# Patient Record
Sex: FEMALE | Race: WHITE | NOT HISPANIC OR LATINO | Employment: OTHER | URBAN - METROPOLITAN AREA
[De-identification: names, ages, dates, MRNs, and addresses within clinical notes are randomized per-mention and may not be internally consistent; named-entity substitution may affect disease eponyms.]

---

## 2017-01-03 ENCOUNTER — DOCUMENTATION ONLY (OUTPATIENT)
Dept: TRANSPLANT | Facility: CLINIC | Age: 27
End: 2017-01-03

## 2017-01-03 DIAGNOSIS — Z79.899 USES NEBULIZER AND INHALER AT HOME: ICD-10-CM

## 2017-01-03 LAB
EXT CALCIUM: 8.6
EXT CHLORIDE: 109
EXT CO2: 27
EXT CREATININE: 26 MG/DL
EXT GLUCOSE: 104
EXT POTASSIUM: 4.5
EXT SODIUM: 143 MMOL/L
EXT TACROLIMUS LVL: 7.1

## 2017-01-03 RX ORDER — PROMETHAZINE HYDROCHLORIDE 25 MG/1
TABLET ORAL
Qty: 45 TABLET | Refills: 0 | Status: SHIPPED | OUTPATIENT
Start: 2017-01-03 | End: 2017-03-06 | Stop reason: SDUPTHER

## 2017-01-03 RX ORDER — INHALER, ASSIST DEVICES
SPACER (EA) MISCELLANEOUS
Qty: 1 DEVICE | Refills: 0 | Status: SHIPPED | OUTPATIENT
Start: 2017-01-03 | End: 2018-01-01 | Stop reason: SDUPTHER

## 2017-01-03 NOTE — PROGRESS NOTES
Results of PFT's repeated on 12/29/16 documented on flow sheet and staff message sent to Dr. Alvarez to review them.

## 2017-01-04 ENCOUNTER — PATIENT MESSAGE (OUTPATIENT)
Dept: TRANSPLANT | Facility: CLINIC | Age: 27
End: 2017-01-04

## 2017-01-05 ENCOUNTER — TELEPHONE (OUTPATIENT)
Dept: TRANSPLANT | Facility: CLINIC | Age: 27
End: 2017-01-05

## 2017-01-05 NOTE — TELEPHONE ENCOUNTER
"Received call from patient stating that she woke up this am with "chest congestion" but can also feel her "sinuses draining". Denied sob, feeling sick, no elevated T or sore throat, no known sick exposure - added that the weather has been "awful". Stated that she called her PCP to be seen but there was no available appt today or tomorrow. Asking if she should go to her local ED. Recommended she try OTC antihistamine and mucinex, hydrate well. Call MD on call if she develops consistently productive cough with discolored sputum, fever, sob. ED if she felt she was having emergent s/s.Otherwise, keep us informed. Patient has issues with transportation here. Patient verbalized her understanding. Note forwarded to Dr. Alvarez for his review.  "

## 2017-01-09 DIAGNOSIS — R11.0 NAUSEA: Primary | ICD-10-CM

## 2017-01-09 RX ORDER — ONDANSETRON HYDROCHLORIDE 8 MG/1
8 TABLET, FILM COATED ORAL EVERY 8 HOURS PRN
Qty: 30 TABLET | Refills: 1 | Status: SHIPPED | OUTPATIENT
Start: 2017-01-09 | End: 2017-01-10 | Stop reason: SDUPTHER

## 2017-01-10 DIAGNOSIS — R11.0 NAUSEA: ICD-10-CM

## 2017-01-10 RX ORDER — ONDANSETRON HYDROCHLORIDE 8 MG/1
8 TABLET, FILM COATED ORAL EVERY 8 HOURS PRN
Qty: 30 TABLET | Refills: 1 | Status: SHIPPED | OUTPATIENT
Start: 2017-01-10 | End: 2018-04-23 | Stop reason: SDUPTHER

## 2017-01-10 NOTE — TELEPHONE ENCOUNTER
"Patient called to report that she "has not improved as fast as I think I should" following initiation of Doxycycline for bronchitis as prescribed by a local ED provider on January 6, 2017.  Chest congestion and sinus drainage persist.  She stated that she has been assessed by her local PCP, who would like to prescribe a course of IV Cefepime and either Decadron IM x 1 dose or a 5 day increase in her oral Prednisone dose.  Informed patient that I would notify Dr. Alvarez of her situation and call her back with his recommendations.  Sent Dr. Alvarez an Epic message to notify him of the patient's situation, and he responded as follows:    Cefepime should be ok but she also needs linezolid. She can have a one time dose of decadron.    Called patient to review Dr. Alvarez's plan.  She was in her PCP's office at the time of my call, so she repeated to the physician that Cefepime and Decadron could be prescribed and a course of Zyvox should be added per Dr. Alvarez's recommendations.  Her PCP denied having any questions.  The patient requested a refill for Zofran, stating "If I'm going to be on Zyvox, I'm going to need Zofran for my stomach."  Informed patient I would send the prescription request to Dr. Alvarez for approval.  Instructed patient to contact us with updates re: when she starts the antibiotic regimen and how she is feeling with the new antibiotics in progress.  The patient denied having any additional questions or needs, and stated she would keep us updated about her health status.    "

## 2017-01-12 ENCOUNTER — PATIENT MESSAGE (OUTPATIENT)
Dept: TRANSPLANT | Facility: CLINIC | Age: 27
End: 2017-01-12

## 2017-01-13 DIAGNOSIS — G89.4 CHRONIC PAIN SYNDROME: ICD-10-CM

## 2017-01-13 RX ORDER — OXYCODONE HYDROCHLORIDE 5 MG/1
10 TABLET ORAL 3 TIMES DAILY PRN
Qty: 90 TABLET | Refills: 0 | Status: SHIPPED | OUTPATIENT
Start: 2017-01-13 | End: 2017-01-26 | Stop reason: SDUPTHER

## 2017-01-13 NOTE — TELEPHONE ENCOUNTER
Patient sent a [employer] message to request a refill for Oxycodone.  Prescription sent to Dr. Alvarez for approval and then electronic transmission to the patient's local pharmacy.

## 2017-01-20 ENCOUNTER — TELEPHONE (OUTPATIENT)
Dept: TRANSPLANT | Facility: CLINIC | Age: 27
End: 2017-01-20

## 2017-01-20 ENCOUNTER — DOCUMENTATION ONLY (OUTPATIENT)
Dept: TRANSPLANT | Facility: CLINIC | Age: 27
End: 2017-01-20

## 2017-01-26 ENCOUNTER — PATIENT MESSAGE (OUTPATIENT)
Dept: TRANSPLANT | Facility: CLINIC | Age: 27
End: 2017-01-26

## 2017-01-26 DIAGNOSIS — G89.4 CHRONIC PAIN SYNDROME: ICD-10-CM

## 2017-01-26 RX ORDER — OXYCODONE HYDROCHLORIDE 5 MG/1
10 TABLET ORAL 3 TIMES DAILY PRN
Qty: 90 TABLET | Refills: 0 | Status: SHIPPED | OUTPATIENT
Start: 2017-01-26 | End: 2017-02-09 | Stop reason: SDUPTHER

## 2017-01-27 RX ORDER — TIZANIDINE 4 MG/1
TABLET ORAL
Qty: 90 TABLET | Refills: 0 | Status: SHIPPED | OUTPATIENT
Start: 2017-01-27 | End: 2017-03-13 | Stop reason: SDUPTHER

## 2017-01-31 ENCOUNTER — PATIENT MESSAGE (OUTPATIENT)
Dept: TRANSPLANT | Facility: CLINIC | Age: 27
End: 2017-01-31

## 2017-02-02 ENCOUNTER — PATIENT MESSAGE (OUTPATIENT)
Dept: TRANSPLANT | Facility: CLINIC | Age: 27
End: 2017-02-02

## 2017-02-02 DIAGNOSIS — R06.2 WHEEZING: ICD-10-CM

## 2017-02-02 RX ORDER — LEVALBUTEROL TARTRATE 45 UG/1
AEROSOL, METERED ORAL
Qty: 15 G | Refills: 0 | Status: SHIPPED | OUTPATIENT
Start: 2017-02-02 | End: 2017-11-13 | Stop reason: SDUPTHER

## 2017-02-02 RX ORDER — PROMETHAZINE HYDROCHLORIDE 25 MG/1
TABLET ORAL
Qty: 45 TABLET | Refills: 0 | Status: SHIPPED | OUTPATIENT
Start: 2017-02-02 | End: 2017-03-06 | Stop reason: SDUPTHER

## 2017-02-07 ENCOUNTER — PATIENT MESSAGE (OUTPATIENT)
Dept: TRANSPLANT | Facility: CLINIC | Age: 27
End: 2017-02-07

## 2017-02-07 DIAGNOSIS — E56.9 VITAMIN DEFICIENCY: Primary | ICD-10-CM

## 2017-02-08 ENCOUNTER — PATIENT MESSAGE (OUTPATIENT)
Dept: TRANSPLANT | Facility: CLINIC | Age: 27
End: 2017-02-08

## 2017-02-09 ENCOUNTER — PATIENT MESSAGE (OUTPATIENT)
Dept: TRANSPLANT | Facility: CLINIC | Age: 27
End: 2017-02-09

## 2017-02-09 ENCOUNTER — DOCUMENTATION ONLY (OUTPATIENT)
Dept: TRANSPLANT | Facility: CLINIC | Age: 27
End: 2017-02-09

## 2017-02-09 DIAGNOSIS — G89.4 CHRONIC PAIN SYNDROME: ICD-10-CM

## 2017-02-09 DIAGNOSIS — E56.9 VITAMIN DEFICIENCY: ICD-10-CM

## 2017-02-09 RX ORDER — OXYCODONE HYDROCHLORIDE 5 MG/1
10 TABLET ORAL 3 TIMES DAILY PRN
Qty: 90 TABLET | Refills: 0 | Status: SHIPPED | OUTPATIENT
Start: 2017-02-09 | End: 2017-02-23 | Stop reason: SDUPTHER

## 2017-02-09 NOTE — PROGRESS NOTES
Results of PFT's done on 2/2/17 documented on flow sheet and staff message sent Dr. Alvarez to review them.

## 2017-02-10 DIAGNOSIS — Z94.2 LUNG REPLACED BY TRANSPLANT: Primary | ICD-10-CM

## 2017-02-13 DIAGNOSIS — E84.19 CYSTIC FIBROSIS WITH GASTROINTESTINAL MANIFESTATIONS: ICD-10-CM

## 2017-02-13 RX ORDER — PANCRELIPASE 24000; 76000; 120000 [USP'U]/1; [USP'U]/1; [USP'U]/1
CAPSULE, DELAYED RELEASE PELLETS ORAL
Qty: 810 CAPSULE | Refills: 0 | Status: SHIPPED | OUTPATIENT
Start: 2017-02-13 | End: 2017-03-27 | Stop reason: SDUPTHER

## 2017-02-22 ENCOUNTER — TELEPHONE (OUTPATIENT)
Dept: TRANSPLANT | Facility: CLINIC | Age: 27
End: 2017-02-22

## 2017-02-22 NOTE — TELEPHONE ENCOUNTER
Returned call to [place] with medical records at [facility]. Asked if Dr. Alvarez is the interpreting provider for the PFT's done there. Explained that he is only the ordering provider and that we would not be forwarding a dictated report. Verbalized her understanding.

## 2017-02-22 NOTE — TELEPHONE ENCOUNTER
----- Message from Caitlin Thomason sent at 2/22/2017  2:58 PM CST -----  Contact: Aiyana from [facility]   Would like to know if Dr. Alvarez will be reading the patient results from the pulmonary function test and if so can they get a copy faxed. Please call  or fax

## 2017-02-23 ENCOUNTER — PATIENT MESSAGE (OUTPATIENT)
Dept: TRANSPLANT | Facility: CLINIC | Age: 27
End: 2017-02-23

## 2017-02-23 DIAGNOSIS — G89.4 CHRONIC PAIN SYNDROME: ICD-10-CM

## 2017-02-23 RX ORDER — OXYCODONE HYDROCHLORIDE 5 MG/1
10 TABLET ORAL 3 TIMES DAILY PRN
Qty: 90 TABLET | Refills: 0 | Status: SHIPPED | OUTPATIENT
Start: 2017-02-23 | End: 2017-03-09 | Stop reason: SDUPTHER

## 2017-02-27 ENCOUNTER — TELEPHONE (OUTPATIENT)
Dept: TRANSPLANT | Facility: CLINIC | Age: 27
End: 2017-02-27

## 2017-02-27 RX ORDER — BUTALBITAL, ACETAMINOPHEN AND CAFFEINE 50; 325; 40 MG/1; MG/1; MG/1
TABLET ORAL
Qty: 120 TABLET | Refills: 0 | Status: SHIPPED | OUTPATIENT
Start: 2017-02-27 | End: 2017-03-06 | Stop reason: SDUPTHER

## 2017-02-27 NOTE — TELEPHONE ENCOUNTER
Returned call to the patient who stated she recently saw her PCP who performed a glucose tolerance test and has dx'd her with having diabetes. Wants her to start Lantus insulin - patient is calling to find out it this is ok. Instructed her to follow the provider's instructions - will update . Patient has clinic visit scheduled on 3/6/17 - verbalized her understanding.

## 2017-02-27 NOTE — TELEPHONE ENCOUNTER
----- Message from Bravo Dumont sent at 2/27/2017  2:20 PM CST -----  Contact: pt   ..Patient calling to speak with coordinator about labs received from another ,  [phone]

## 2017-03-01 ENCOUNTER — TELEPHONE (OUTPATIENT)
Dept: OTOLARYNGOLOGY | Facility: CLINIC | Age: 27
End: 2017-03-01

## 2017-03-03 ENCOUNTER — TELEPHONE (OUTPATIENT)
Dept: OTOLARYNGOLOGY | Facility: CLINIC | Age: 27
End: 2017-03-03

## 2017-03-03 DIAGNOSIS — R06.2 WHEEZING: ICD-10-CM

## 2017-03-03 RX ORDER — LEVALBUTEROL TARTRATE 45 UG/1
AEROSOL, METERED ORAL
Qty: 15 G | Refills: 0 | Status: SHIPPED | OUTPATIENT
Start: 2017-03-03 | End: 2017-03-06 | Stop reason: SDUPTHER

## 2017-03-06 ENCOUNTER — TELEPHONE (OUTPATIENT)
Dept: TRANSPLANT | Facility: CLINIC | Age: 27
End: 2017-03-06

## 2017-03-06 ENCOUNTER — OFFICE VISIT (OUTPATIENT)
Dept: OBSTETRICS AND GYNECOLOGY | Facility: CLINIC | Age: 27
End: 2017-03-06
Payer: MEDICAID

## 2017-03-06 ENCOUNTER — OFFICE VISIT (OUTPATIENT)
Dept: TRANSPLANT | Facility: CLINIC | Age: 27
End: 2017-03-06
Payer: MEDICAID

## 2017-03-06 ENCOUNTER — HOSPITAL ENCOUNTER (OUTPATIENT)
Dept: PULMONOLOGY | Facility: CLINIC | Age: 27
Discharge: HOME | End: 2017-03-06
Payer: MEDICAID

## 2017-03-06 ENCOUNTER — HOSPITAL ENCOUNTER (OUTPATIENT)
Dept: RADIOLOGY | Facility: HOSPITAL | Age: 27
Discharge: HOME | End: 2017-03-06
Attending: INTERNAL MEDICINE
Payer: MEDICAID

## 2017-03-06 VITALS
TEMPERATURE: 99 F | WEIGHT: 114 LBS | BODY MASS INDEX: 21.52 KG/M2 | RESPIRATION RATE: 20 BRPM | HEIGHT: 61 IN | SYSTOLIC BLOOD PRESSURE: 134 MMHG | OXYGEN SATURATION: 95 % | DIASTOLIC BLOOD PRESSURE: 99 MMHG | HEART RATE: 110 BPM

## 2017-03-06 VITALS
WEIGHT: 114.63 LBS | DIASTOLIC BLOOD PRESSURE: 90 MMHG | HEIGHT: 61 IN | SYSTOLIC BLOOD PRESSURE: 144 MMHG | BODY MASS INDEX: 21.64 KG/M2

## 2017-03-06 DIAGNOSIS — G89.4 CHRONIC PAIN SYNDROME: ICD-10-CM

## 2017-03-06 DIAGNOSIS — Z01.419 ROUTINE GYNECOLOGICAL EXAMINATION: Primary | ICD-10-CM

## 2017-03-06 DIAGNOSIS — K86.89 PANCREATIC INSUFFICIENCY: Primary | ICD-10-CM

## 2017-03-06 DIAGNOSIS — Z94.2 LUNG REPLACED BY TRANSPLANT: ICD-10-CM

## 2017-03-06 DIAGNOSIS — J44.81 BRONCHIOLITIS OBLITERANS SYNDROME: ICD-10-CM

## 2017-03-06 DIAGNOSIS — M62.830 MUSCLE SPASM OF BACK: ICD-10-CM

## 2017-03-06 DIAGNOSIS — Z79.2 PROPHYLACTIC ANTIBIOTIC: ICD-10-CM

## 2017-03-06 DIAGNOSIS — E56.9 VITAMIN DEFICIENCY: Primary | ICD-10-CM

## 2017-03-06 DIAGNOSIS — Z48.298 ENCOUNTER FOLLOWING ORGAN TRANSPLANT: Primary | ICD-10-CM

## 2017-03-06 DIAGNOSIS — D84.9 IMMUNOSUPPRESSION: ICD-10-CM

## 2017-03-06 DIAGNOSIS — N94.6 DYSMENORRHEA: ICD-10-CM

## 2017-03-06 LAB
PRE FEV1 FVC: 39
PRE FEV1: 0.95
PRE FVC: 2.46
PREDICTED FEV1 FVC: 88
PREDICTED FEV1: 2.82
PREDICTED FVC: 3.24

## 2017-03-06 PROCEDURE — 71020 XR CHEST PA AND LATERAL: CPT | Mod: 26,,, | Performed by: RADIOLOGY

## 2017-03-06 PROCEDURE — 99214 OFFICE O/P EST MOD 30 MIN: CPT | Mod: 25,S$PBB,, | Performed by: INTERNAL MEDICINE

## 2017-03-06 PROCEDURE — 94010 BREATHING CAPACITY TEST: CPT | Mod: 26,S$PBB,, | Performed by: INTERNAL MEDICINE

## 2017-03-06 PROCEDURE — 99999 PR PBB SHADOW E&M-EST. PATIENT-LVL III: CPT | Mod: PBBFAC,,, | Performed by: OBSTETRICS & GYNECOLOGY

## 2017-03-06 PROCEDURE — 99213 OFFICE O/P EST LOW 20 MIN: CPT | Mod: PBBFAC | Performed by: OBSTETRICS & GYNECOLOGY

## 2017-03-06 PROCEDURE — 88175 CYTOPATH C/V AUTO FLUID REDO: CPT

## 2017-03-06 PROCEDURE — 99999 PR PBB SHADOW E&M-EST. PATIENT-LVL III: CPT | Mod: PBBFAC,,, | Performed by: INTERNAL MEDICINE

## 2017-03-06 PROCEDURE — 99395 PREV VISIT EST AGE 18-39: CPT | Mod: S$PBB,,, | Performed by: OBSTETRICS & GYNECOLOGY

## 2017-03-06 RX ORDER — ARIPIPRAZOLE 2 MG/1
2 TABLET ORAL DAILY
COMMUNITY
End: 2019-01-01

## 2017-03-06 RX ORDER — GABAPENTIN 300 MG/1
300 CAPSULE ORAL 3 TIMES DAILY
Qty: 90 CAPSULE | Refills: 11 | Status: ON HOLD | OUTPATIENT
Start: 2017-03-06 | End: 2018-01-01 | Stop reason: SDUPTHER

## 2017-03-06 NOTE — TELEPHONE ENCOUNTER
----- Message from Caitlin Thomason sent at 3/6/2017  1:20 PM CST -----  Contact: patient  Calling to ask Jena a question. Please call

## 2017-03-06 NOTE — PROGRESS NOTES
PT HERE FOR ANNUAL.  ON DEPO AND LUNG TRANSPLANT WANTS HER OFF DUE TO ? ABD SWELLING AND DIFFICULTY BREATHING.  DOES NOT WANT IUD DUE TO INFECTION AND PAIN ON INSERTION (HAD ONE PREVIOUSLY).  CONSIDERING [facility].    ROS:  GENERAL: No fever, chills, fatigability or weight loss.  VULVAR: No pain, no lesions and no itching.  VAGINAL: No relaxation, no itching, no discharge, no abnormal bleeding and no lesions.  ABDOMEN: No abdominal pain. Denies nausea. Denies vomiting. No diarrhea. No constipation  BREAST: Denies pain. No lumps. No discharge.  URINARY: No incontinence, no nocturia, no frequency and no dysuria.  CARDIOVASCULAR: No chest pain. No shortness of breath. No leg cramps.  NEUROLOGICAL: No headaches. No vision changes.  The remainder of the review of systems was negative.    PE:  General Appearance: normal weight And Well developed. Well nourished. In no acute distress.  Vulva: Lesions: No.  Urethral Meatus: Normal size. Normal location. No lesions. No prolapse.  Urethra: No masses. No tenderness. No prolapse. No scarring.  Bladder: No masses. No tenderness.  Vagina: Mucosa NI:yes discharge no, atrophy no, cystocele no or rectocele no.  Cervix: Lesion: no  Stenotic: no Cervical motion tenderness: no  Uterus: Uterus size: 5 weeks. Support good. Uterus size: Normal  Adnexa: Masses: No Tenderness: No CDS Nodularity: No  Abdomen: normal weight No masses. No tenderness.  Breasts: No bilateral masses. No bilateral discharge. No bilateral tenderness. No bilateral fibrocystic changes.  Neck: No thyroid enlargement. No thyroid masses.  Skin: Rashes: No      PROCEDURES:    PLAN:     DIAGNOSIS:  1. Routine gynecological examination    2. Dysmenorrhea        MEDICATIONS & ORDERS:  Orders Placed This Encounter    Liquid-based pap smear, screening       Patient was counseled today on the new ACS guidelines for cervical cytology screening as well as the current recommendations for breast cancer screening. She was counseled to  follow up with her PCP for other routine health maintenance. Counseling session lasted approximately 10 minutes, and all her questions were answered.         FOLLOW-UP: With me in [facility]

## 2017-03-06 NOTE — PROGRESS NOTES
LUNG TRANSPLANT RECIPIENT FOLLOW-UP    Reason for Visit: Follow-up after lung transplantation.                                                                                                         Date of Transplant: 6/12/14                                                                               Reason for Transplant: cystic fibrosis                                                                               Type of Transplant: bilateral sequential lung                                                                               CMV Status: D+ / R-                                                                               Major Complications:     1. Vocal cord dysfunction- resolved   2. Early readmission due to MRSA pulmonary infection with persistent positive cultures   3. A1 Rejection 8/2014   4. C glabrata positive sputum-recurrent  5. Pseudomonas pneumonia in 02/2015 resolved   6. 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 Mild CARLOS EDUARDO (grade 1)                                                                               History of Present Illness: Juanita Ibarra is a 27 y.o. year old female is here for follow up. Overall feeling little worse for last few weeks. Reports shortness of breath which is progessive since January. She received antibiotics and reports some improvement after that.  No fever, chills. No hemoptysis. Weight stable.     Review of Systems   Constitutional: Negative for chills and fever.   HENT: Negative for congestion.    Eyes: Negative for photophobia.   Respiratory: Positive for cough and shortness of breath. Negative for hemoptysis and wheezing.    Cardiovascular: Negative for chest pain and palpitations.   Gastrointestinal: Negative for nausea and vomiting.   Musculoskeletal: Negative for myalgias.   Skin: Negative for rash.   Neurological: Positive for weakness. Negative for focal weakness.   Psychiatric/Behavioral: Negative for depression.     BP (!) 134/99 (BP Location: Left arm,  "Patient Position: Sitting, BP Method: Automatic)  Pulse 110  Temp 98.6 °F (37 °C) (Oral)   Resp 20  Ht 5' 1" (1.549 m)  Wt 51.7 kg (114 lb)  SpO2 95%  BMI 21.54 kg/m2    Physical Exam   Constitutional: She is oriented to person, place, and time and well-developed, well-nourished, and in no distress. No distress.   HENT:   Head: Atraumatic.   Eyes: Pupils are equal, round, and reactive to light.   Neck: Neck supple.   Cardiovascular: Normal rate and regular rhythm.    Pulmonary/Chest: Effort normal and breath sounds normal. No respiratory distress. She has no wheezes.   Abdominal: Soft. There is no tenderness.   Musculoskeletal: Normal range of motion.   Neurological: She is alert and oriented to person, place, and time. GCS score is 15.   Skin: Skin is warm. She is not diaphoretic.     Labs:  cbc, cmp, tacrolimus Latest Ref Rng & Units 12/21/2016 12/22/2016 3/6/2017   TACROLIMUS LVL 5.0 - 15.0 ng/mL 4.5(L) 3.5(L) -   WHITE BLOOD CELL COUNT 3.90 - 12.70 K/uL 3.94 9.76 6.71   RBC 4.00 - 5.40 M/uL 3.65(L) 3.37(L) 4.37   HEMOGLOBIN 12.0 - 16.0 g/dL 11.1(L) 10.1(L) 12.7   HEMATOCRIT 37.0 - 48.5 % 34.7(L) 32.2(L) 40.8   MCV 82 - 98 fL 95 96 93   MCH 27.0 - 31.0 pg 30.4 30.0 29.1   MCHC 32.0 - 36.0 % 32.0 31.4(L) 31.1(L)   RDW 11.5 - 14.5 % 13.1 13.4 13.9   PLATELETS 150 - 350 K/uL 112(L) 129(L) 153   MPV 9.2 - 12.9 fL 11.4 11.8 11.9   GRAN # 1.8 - 7.7 K/uL 3.3 8.1(H) 3.4   LYMPH # 1.0 - 4.8 K/uL 0.6(L) 1.1 2.7   MONO # 0.3 - 1.0 K/uL 0.0(L) 0.6 0.4   EOSINOPHIL% 0.0 - 8.0 % 0.3 0.0 2.2   BASOPHIL% 0.0 - 1.9 % 0.3 0.0 0.3   DIFFERENTIAL METHOD - Automated Automated Automated   SODIUM 136 - 145 mmol/L 135(L) 140 139   POTASSIUM 3.5 - 5.1 mmol/L 4.7 5.1 4.0   CHLORIDE 95 - 110 mmol/L 109 113(H) 108   CO2 23 - 29 mmol/L 16(L) 20(L) 21(L)   GLUCOSE 70 - 110 mg/dL 412(H) 181(H) 133(H)   BUN BLD 6 - 20 mg/dL 19 23(H) 32(H)   CREATININE 0.5 - 1.4 mg/dL 1.5(H) 1.2 1.3   CALCIUM 8.7 - 10.5 mg/dL 8.0(L) 8.4(L) 9.5   PROTEIN " TOTAL 6.0 - 8.4 g/dL - - 8.0   ALBUMIN 3.5 - 5.2 g/dL - - 4.0   BILIRUBIN TOTAL 0.1 - 1.0 mg/dL - - 0.2   ALK PHOS 55 - 135 U/L - - 96   AST 10 - 40 U/L - - 20   ALT 10 - 44 U/L - - 23   ANION GAP 8 - 16 mmol/L 10 7(L) 10   EGFR IF AFRICAN AMERICAN >60 mL/min/1.73 m:2 54.6(A) >60.0 >60.0   EGFR IF NON-AFRICAN AMERICAN >60 mL/min/1.73 m:2 47.4(A) >60.0 56.0(A)     Pulmonary Function Tests 3/6/2017 2/2/2017 12/29/2016 12/19/2016 9/21/2016 8/11/2016 5/19/2016   FVC 2.46 2.6 2.75 2.54 2.83 2.56 2.94   FEV1 0.95 1.01 1.07 1.03 1.4 1.34 1.45   TLC (liters) - - - - - - -   FVC% 76 80 85 78 91 79 90   FEV1% 34 36 38 36 51 47 51   FEF 25-75 0.33 0.4 0.36 0.39 0.56 0.59 0.57   FEF 25-75% 10 12 10 11 17 17 17   TLC% - - - - - - -   RV - - - - - - -   RV% - - - - - - -       Imaging:  Results for orders placed during the hospital encounter of 03/06/17   X-Ray Chest PA And Lateral    Narrative Chest 2 views compared to 12/20/2016.  Postop changes and right portacatheter remain.  Heart size and pulmonary vascularity is similar.  The lungs are well-aerated and clear.  No pleural fluid.  Bones are intact.    Impression stable postoperative change.      Electronically signed by: ANTOINE TOPETE MD  Date:     03/06/17  Time:    09:28        Assessment:  1. Aftercare following organ transplant  2. Lung replaced by transplant  3. Immunosuppression  4.Prophylactic antibiotic  5.Bronchiolitis obliterans syndrome      Plan:   1. PFTs and CXR reviewed. PFTs show decline in FEV1. She has bronchiolitis obliterans syndrome but is not experiencing rapid decline clinically. We will monitor PFTs closely    2. Cont current immunosuppressant : Prograf and prednisone    3. Cont current abx: Dapsone    4. Cont pancreatic enzyme supplementation    Steffen Rachel MD  [facility] fellow    Attending Note:    I have seen and evaluated the patient with the fellow. Their note reflects the content of our discussion and my plan of care. She has stable CARLOS EDUARDO.  Complains of increase in chest pains. I will increase her gabapentin but explained to her that I will not increase her narcotics. I advised her to seek an evaluation by pain medicine in [place]. She says that because of her pain her dyspnea has worsened and she cannot exercise as much. I still advised her to exercise as much as she can since her swings in shortness of breath and stability are likely due to physical deconditioning.     Will adjust her tacrolimus to maintain a level >6 and restart mycophenolate.          Jake Alvarez MD  Pulmonary/Critical Care Medicine

## 2017-03-06 NOTE — TELEPHONE ENCOUNTER
"Returned call to the patient who asked if [facility] had a Pain Management clinic. Patient was informed today by Dr. Alvarez during her clinic visit that she needed to establish care with a Pain Management clinic because of her reported need for more pain medication than she currently takes. Informed that there is a Pain Management clinic at the [facility]. Patient wanted to know if they take "self pay" - uncertain re: their policy re: self pay but informed patient she could call them to discuss. Patient then stated that if they required her to be see a close intervals, this was too far to travel. Reinforced need to find a pain management provider closer to home. Received orders from Dr. Alvarez to arrange for the patient to take gabapentin 300 mg tid - patient requested that the erx be sent to her local [facility]'s.  "

## 2017-03-07 ENCOUNTER — OFFICE VISIT (OUTPATIENT)
Dept: OTOLARYNGOLOGY | Facility: CLINIC | Age: 27
End: 2017-03-07
Payer: MEDICAID

## 2017-03-07 VITALS
HEART RATE: 122 BPM | HEIGHT: 61 IN | DIASTOLIC BLOOD PRESSURE: 103 MMHG | SYSTOLIC BLOOD PRESSURE: 155 MMHG | BODY MASS INDEX: 21.81 KG/M2 | WEIGHT: 115.5 LBS

## 2017-03-07 DIAGNOSIS — J32.0 CHRONIC MAXILLARY SINUSITIS: Primary | ICD-10-CM

## 2017-03-07 DIAGNOSIS — J33.9 NASAL POLYPOSIS: ICD-10-CM

## 2017-03-07 DIAGNOSIS — E84.0 CYSTIC FIBROSIS WITH PULMONARY MANIFESTATIONS: ICD-10-CM

## 2017-03-07 DIAGNOSIS — Z94.2 LUNG REPLACED BY TRANSPLANT: ICD-10-CM

## 2017-03-07 PROCEDURE — 99999 PR PBB SHADOW E&M-EST. PATIENT-LVL III: CPT | Mod: PBBFAC,,, | Performed by: OTOLARYNGOLOGY

## 2017-03-07 PROCEDURE — 99214 OFFICE O/P EST MOD 30 MIN: CPT | Mod: 25,S$PBB,, | Performed by: OTOLARYNGOLOGY

## 2017-03-07 PROCEDURE — 31231 NASAL ENDOSCOPY DX: CPT | Mod: PBBFAC | Performed by: OTOLARYNGOLOGY

## 2017-03-07 PROCEDURE — 99213 OFFICE O/P EST LOW 20 MIN: CPT | Mod: PBBFAC | Performed by: OTOLARYNGOLOGY

## 2017-03-07 RX ORDER — TACROLIMUS 1 MG/1
3 CAPSULE ORAL EVERY 12 HOURS
Qty: 180 CAPSULE | Refills: 11
Start: 2017-03-07 | End: 2018-01-22 | Stop reason: SDUPTHER

## 2017-03-07 RX ORDER — PREDNISONE 10 MG/1
TABLET ORAL
Qty: 39 TABLET | Refills: 0 | Status: SHIPPED | OUTPATIENT
Start: 2017-03-07 | End: 2017-03-30 | Stop reason: DRUGHIGH

## 2017-03-07 RX ORDER — TACROLIMUS 0.5 MG/1
0.5 CAPSULE ORAL EVERY 12 HOURS
Qty: 60 CAPSULE | Refills: 11 | Status: SHIPPED | OUTPATIENT
Start: 2017-03-07 | End: 2017-09-14 | Stop reason: SDUPTHER

## 2017-03-07 RX ORDER — MYCOPHENOLATE MOFETIL 250 MG/1
500 CAPSULE ORAL 2 TIMES DAILY
Qty: 120 CAPSULE | Refills: 11 | Status: SHIPPED | OUTPATIENT
Start: 2017-03-07 | End: 2017-10-09 | Stop reason: SDUPTHER

## 2017-03-07 NOTE — PROGRESS NOTES
"  Subjective:      Juanita is a 27 y.o. female who comes for follow-up of CRS.  Over 2 years s/p revision ESS.  Had laryngeal framework surgery by Dr. Null in August 2016.  Has gradually worsening nasal congestion, facial and forehead pressure, postnasal drip.  Also snoring since lung transplant 3 years ago, getting worse.  Can't tolerate nasal rinses due to ear pain.  Thinks she may need sinus surgery again.    SNOT-22 score = 63, NOSE score = 80%    The patient's medications, allergies, past medical, surgical, social and family histories were reviewed and updated as appropriate.    A detailed review of systems was obtained with pertinent positives as per the above HPI, and otherwise negative.        Objective:     BP (!) 155/103  Pulse (!) 122  Ht 5' 1" (1.549 m)  Wt 52.4 kg (115 lb 8.3 oz)  BMI 21.83 kg/m2       Constitutional:   She appears well-developed. She is cooperative.     Head:  Normocephalic.     Nose:  Rhinorrhea present. No mucosal edema, septal deviation or polyps. No epistaxis. Turbinates normal, no turbinate masses and no turbinate hypertrophy.  Right sinus exhibits no maxillary sinus tenderness and no frontal sinus tenderness. Left sinus exhibits no maxillary sinus tenderness and no frontal sinus tenderness.   Thick crusts in nose, removed.    Mouth/Throat  Oropharynx clear and moist without lesions or asymmetry. No oropharyngeal exudate or posterior oropharyngeal erythema.     Neck:  No adenopathy. Normal range of motion present.     She has no cervical adenopathy.       Procedure    Nasal endoscopy performed.  See procedure note.     Left nasal valve     Left middle meatus with polyps     Left posterior nasal draiange     Right nasal valve     Right middle meatus     Nasopharynx        Data Reviewed    WBC (K/uL)   Date Value   03/06/2017 6.71     Eosinophil% (%)   Date Value   03/06/2017 2.2     Eos, Fluid (%)   Date Value   07/31/2014 1     Eos # (K/uL)   Date Value   03/06/2017 0.2 "     Platelets (K/uL)   Date Value   03/06/2017 153     Glucose (mg/dL)   Date Value   03/06/2017 133 (H)     No results found for: IGE    No sinus imaging available.      Assessment:     1. Chronic maxillary sinusitis    2. Nasal polyposis    3. Cystic fibrosis with pulmonary manifestations    4. Lung replaced by transplant         Plan:     Rx prednisone medium dose with taper.  Rx tobramycin-fluticasone compounded in nasal nebulizer from [employer].  Will defer consideration of revision sinus surgery until this summer when she is out of school.  Also instructed by Dr. Null to avoid intubation for 1 year after laryngeal surgery.  Return if symptoms worsen or fail to improve.

## 2017-03-07 NOTE — TELEPHONE ENCOUNTER
Received written order from Dr. Alvarez to increase Prograf dose to 3.5 mg every 12 hours (from 3 mg every 12 hours); resume  mg every 12 hours; repeat Prograf level, BMP, CBC next week.  Contacted patient, confirmed that the Prograf level is an accurate 12 hour trough and gave her dose change instructions which she stated she had written down and was able to repeat back correctly. Will have repeat lab work on 3/16/17. Patient also aware that her PCP, Dr. Godinez, has requested most recent clinic note and med list - patient verbalized her authorization to send requested information - faxed to Dr. Godinez's office.

## 2017-03-07 NOTE — PROCEDURES
Nasal/sinus endoscopy  Date/Time: 3/7/2017 4:26 PM  Performed by: RUDI LARRY  Authorized by: RUDI LARRY     Consent Done?:  Yes (Verbal)  Anesthesia:     Local anesthetic:  Lidocaine 4% and Tushar-Synephrine 1/2%    Patient tolerance:  Patient tolerated the procedure well with no immediate complications  Nose:     Procedure Performed:  Nasal Endoscopy  External:      No external nasal deformity  Intranasal:      Mucosa polyps     Mucosa ulcers not present     No mucosa lesions present     Turbinates not enlarged     No septum gross deformity  Nasopharynx:      No mucosa lesions     Adenoids not present     Posterior choanae patent     Eustachian tube patent     Grade 2 polyps in left middle meatus.  Thick yellow crusting in both maxillary anstrostomies, partially removed.

## 2017-03-07 NOTE — TELEPHONE ENCOUNTER
----- Message from Jake Alvarez MD sent at 3/6/2017  7:26 PM CST -----  Need to increase her tacro to 3.5 bid. Her level needs to be at least >6.

## 2017-03-08 ENCOUNTER — PATIENT MESSAGE (OUTPATIENT)
Dept: TRANSPLANT | Facility: CLINIC | Age: 27
End: 2017-03-08

## 2017-03-09 ENCOUNTER — TELEPHONE (OUTPATIENT)
Dept: TRANSPLANT | Facility: CLINIC | Age: 27
End: 2017-03-09

## 2017-03-09 DIAGNOSIS — G89.4 CHRONIC PAIN SYNDROME: ICD-10-CM

## 2017-03-09 RX ORDER — OXYCODONE HYDROCHLORIDE 5 MG/1
10 TABLET ORAL 3 TIMES DAILY PRN
Qty: 90 TABLET | Refills: 0 | Status: SHIPPED | OUTPATIENT
Start: 2017-03-09 | End: 2017-03-30 | Stop reason: ALTCHOICE

## 2017-03-09 NOTE — TELEPHONE ENCOUNTER
Message sent to Dr. Alvarez and patient contacted by phone because she also left a phone message. Please see telephone note dated today, 3/9/17.

## 2017-03-09 NOTE — TELEPHONE ENCOUNTER
----- Message from Bravo Dumont sent at 3/9/2017 11:43 AM CST -----  Contact: pt   Please call, [phone]

## 2017-03-09 NOTE — TELEPHONE ENCOUNTER
"Returned call to the patient - patient called because she wanted to make sure we knew she had "not" asked her PCP for pain meds recently but called to get a recommendation for a pain management provider.  Informed on 3/7/17 by Briseyda PCP's nurse when she called to request some recent office notes, that had spoken to the patient and reinforced that they do not prescribe narcotic pain meds, adding that the only exception had been Oct. 2016 when the patient was given an rx for oxycodone after calling them because she stated Dr. Alvarez was out of town and wouldn't be able to refill it for her in time. Patient had not been told she would be unable to get her refill, only that it may take longer than usual because he was out of the office. Patient maintains she believed at the time that she would not be able to obtain a refill for oxycodone before she ran out of her current supply.   Patient was sent a [facility] message today following her routine request to refill oxycodone, explaining per Dr. Alvarez, that he would give her 1 more refill but after that she needed to have a pain management  provider continue to support the pain medication. Recommended she try to wean the dose to make the rx last longer in case there is a delay getting an appt - stated that she has identified a pain management provider and requested he accept her as a patient but has not heard back from them yet. Is concerned that the appt will be after she no longer has meds. Informed that her comments and concerns would be passed on to Dr. Alvarez. Verbalized her understanding.  "

## 2017-03-10 ENCOUNTER — PATIENT MESSAGE (OUTPATIENT)
Dept: TRANSPLANT | Facility: CLINIC | Age: 27
End: 2017-03-10

## 2017-03-13 ENCOUNTER — PATIENT MESSAGE (OUTPATIENT)
Dept: TRANSPLANT | Facility: CLINIC | Age: 27
End: 2017-03-13

## 2017-03-13 RX ORDER — TIZANIDINE 4 MG/1
TABLET ORAL
Qty: 90 TABLET | Refills: 0 | Status: SHIPPED | OUTPATIENT
Start: 2017-03-13 | End: 2017-05-02 | Stop reason: SDUPTHER

## 2017-03-20 ENCOUNTER — TELEPHONE (OUTPATIENT)
Dept: OTOLARYNGOLOGY | Facility: CLINIC | Age: 27
End: 2017-03-20

## 2017-03-20 NOTE — TELEPHONE ENCOUNTER
Spoke with patient. Informed her that MRI is not contraindicated, however, if surgery/intubation were needed a size 6.0 ETT would be recommended. Stated no plans for surgery/intubation at this time, and verbalized understanding of information.

## 2017-03-20 NOTE — TELEPHONE ENCOUNTER
----- Message from Gary Null MD sent at 3/20/2017  1:42 PM CDT -----  Contact: Patient  No contraindication for an MRI. She has a [employer] ribbon implant in her vocal fold.   If they need to intubate her for anything, I'd use no larger than a size 6.0 ETT.    ----- Message -----     From: Dariana Payton RN     Sent: 3/20/2017   1:35 PM       To: Gary Null MD     See below. Any contraindication for MRI?   Dariana  ----- Message -----     From: Rosalva Shipley LPN     Sent: 3/20/2017  12:30 PM       To: Dariana Payton RN    Gisel Pickens had throat  Surgery in August to reconstruct her larynx by Dr. Null. Please call her. She want to know if she had anything done besides the implant because she would like to have an MRI done and do not know if she can or can not. She is currently admitted in [facility] in [place] as a hospital admitted patient. Thanks!  Rosalva    ----- Message -----     From: Parth Wolff     Sent: 3/20/2017  12:09 PM       To: [facility] Staff    Pt has a question regarding a past surgery and would like to speak to staff, please follow up at soonest convenience

## 2017-03-20 NOTE — TELEPHONE ENCOUNTER
Spoke to patient. I will send a message to Dr. Null's nurse to contact patient. Understanding verbalized. Call PRN.

## 2017-03-23 ENCOUNTER — TELEPHONE (OUTPATIENT)
Dept: OBSTETRICS AND GYNECOLOGY | Facility: CLINIC | Age: 27
End: 2017-03-23

## 2017-03-23 ENCOUNTER — PATIENT MESSAGE (OUTPATIENT)
Dept: TRANSPLANT | Facility: CLINIC | Age: 27
End: 2017-03-23

## 2017-03-23 ENCOUNTER — TELEPHONE (OUTPATIENT)
Dept: TRANSPLANT | Facility: CLINIC | Age: 27
End: 2017-03-23

## 2017-03-23 ENCOUNTER — TELEPHONE (OUTPATIENT)
Dept: OTOLARYNGOLOGY | Facility: CLINIC | Age: 27
End: 2017-03-23

## 2017-03-23 DIAGNOSIS — N94.6 DYSMENORRHEA: Primary | ICD-10-CM

## 2017-03-23 NOTE — TELEPHONE ENCOUNTER
Patient called to say that she is scheduled for a hysterectomy (laprascopic) on 4/11/17 and her GYN provider, Dr. Dias has asked her to request a medical clearance from us. Message sent to Dr. Alvarez and received his instructions that he needed to see her in clinic before clearing her, even though he saw her last on 3/6/17 for a routine follow post lung transplant visit. Patient informed, questioned need to be seen again so soon - restated to the patient that Dr. Alvarez is aware that he saw her on 3/6/17 but has instructed that she be scheduled for another visit before her surgery if she needs clearance. Patient will be here for other appts on 4/10/17, scheduled to be seen by Dr. Alvarez then - instructed her to arrive by 08:00 for labs and quickly have her other studies and get back for the visit so she will be finished in time to make it to her other appts. Patient verbalized her understanding.

## 2017-03-24 ENCOUNTER — TELEPHONE (OUTPATIENT)
Dept: OTOLARYNGOLOGY | Facility: CLINIC | Age: 27
End: 2017-03-24

## 2017-03-24 ENCOUNTER — PATIENT MESSAGE (OUTPATIENT)
Dept: TRANSPLANT | Facility: CLINIC | Age: 27
End: 2017-03-24

## 2017-03-24 DIAGNOSIS — Z94.2 LUNG REPLACED BY TRANSPLANT: Primary | ICD-10-CM

## 2017-03-27 ENCOUNTER — TELEPHONE (OUTPATIENT)
Dept: OTOLARYNGOLOGY | Facility: CLINIC | Age: 27
End: 2017-03-27

## 2017-03-27 DIAGNOSIS — E84.19 CYSTIC FIBROSIS WITH GASTROINTESTINAL MANIFESTATIONS: ICD-10-CM

## 2017-03-27 RX ORDER — PANCRELIPASE 24000; 76000; 120000 [USP'U]/1; [USP'U]/1; [USP'U]/1
CAPSULE, DELAYED RELEASE PELLETS ORAL
Qty: 810 CAPSULE | Refills: 0 | Status: SHIPPED | OUTPATIENT
Start: 2017-03-27 | End: 2017-05-16 | Stop reason: SDUPTHER

## 2017-03-28 DIAGNOSIS — J33.9 NASAL POLYPOSIS: ICD-10-CM

## 2017-03-28 DIAGNOSIS — J32.0 CHRONIC MAXILLARY SINUSITIS: Primary | ICD-10-CM

## 2017-03-28 DIAGNOSIS — Z94.2 LUNG REPLACED BY TRANSPLANT: ICD-10-CM

## 2017-03-28 DIAGNOSIS — E84.0 CYSTIC FIBROSIS WITH PULMONARY MANIFESTATIONS: ICD-10-CM

## 2017-03-29 ENCOUNTER — TELEPHONE (OUTPATIENT)
Dept: OTOLARYNGOLOGY | Facility: CLINIC | Age: 27
End: 2017-03-29

## 2017-03-29 DIAGNOSIS — E84.0 CYSTIC FIBROSIS WITH PULMONARY MANIFESTATIONS: ICD-10-CM

## 2017-03-29 DIAGNOSIS — J32.0 CHRONIC MAXILLARY SINUSITIS: Primary | ICD-10-CM

## 2017-03-29 NOTE — TELEPHONE ENCOUNTER
Called to schedule CT and post op with Dr. Olsen. No answer. Left message along with contact information to call back and schedule.

## 2017-03-30 ENCOUNTER — HOSPITAL ENCOUNTER (OUTPATIENT)
Dept: RADIOLOGY | Facility: HOSPITAL | Age: 27
Discharge: HOME | End: 2017-03-30
Attending: OTOLARYNGOLOGY
Payer: MEDICAID

## 2017-03-30 ENCOUNTER — HOSPITAL ENCOUNTER (OUTPATIENT)
Dept: RADIOLOGY | Facility: HOSPITAL | Age: 27
Discharge: HOME | End: 2017-03-30
Attending: INTERNAL MEDICINE
Payer: MEDICAID

## 2017-03-30 ENCOUNTER — OFFICE VISIT (OUTPATIENT)
Dept: TRANSPLANT | Facility: CLINIC | Age: 27
End: 2017-03-30
Payer: MEDICAID

## 2017-03-30 ENCOUNTER — HOSPITAL ENCOUNTER (OUTPATIENT)
Dept: PULMONOLOGY | Facility: CLINIC | Age: 27
Discharge: HOME | End: 2017-03-30
Payer: MEDICAID

## 2017-03-30 ENCOUNTER — TELEPHONE (OUTPATIENT)
Dept: OTOLARYNGOLOGY | Facility: CLINIC | Age: 27
End: 2017-03-30

## 2017-03-30 VITALS
RESPIRATION RATE: 20 BRPM | DIASTOLIC BLOOD PRESSURE: 91 MMHG | OXYGEN SATURATION: 99 % | SYSTOLIC BLOOD PRESSURE: 138 MMHG | TEMPERATURE: 98 F | BODY MASS INDEX: 22.47 KG/M2 | HEIGHT: 61 IN | HEART RATE: 121 BPM | WEIGHT: 119 LBS

## 2017-03-30 DIAGNOSIS — J32.0 CHRONIC MAXILLARY SINUSITIS: ICD-10-CM

## 2017-03-30 DIAGNOSIS — Z48.298 ENCOUNTER FOLLOWING ORGAN TRANSPLANT: Primary | ICD-10-CM

## 2017-03-30 DIAGNOSIS — Z79.2 PROPHYLACTIC ANTIBIOTIC: ICD-10-CM

## 2017-03-30 DIAGNOSIS — Z94.2 LUNG REPLACED BY TRANSPLANT: ICD-10-CM

## 2017-03-30 DIAGNOSIS — J44.81 BRONCHIOLITIS OBLITERANS SYNDROME: ICD-10-CM

## 2017-03-30 DIAGNOSIS — Z01.818 PRE-OP EVALUATION: ICD-10-CM

## 2017-03-30 DIAGNOSIS — D84.9 IMMUNOSUPPRESSION: ICD-10-CM

## 2017-03-30 DIAGNOSIS — E84.0 CYSTIC FIBROSIS WITH PULMONARY MANIFESTATIONS: ICD-10-CM

## 2017-03-30 LAB
PRE FEV1 FVC: 40
PRE FEV1: 0.93
PRE FVC: 2.35
PREDICTED FEV1 FVC: 88
PREDICTED FEV1: 2.82
PREDICTED FVC: 3.24

## 2017-03-30 PROCEDURE — 94010 BREATHING CAPACITY TEST: CPT | Mod: 26,S$PBB,, | Performed by: INTERNAL MEDICINE

## 2017-03-30 PROCEDURE — 99999 PR PBB SHADOW E&M-EST. PATIENT-LVL III: CPT | Mod: PBBFAC,,, | Performed by: INTERNAL MEDICINE

## 2017-03-30 PROCEDURE — 99213 OFFICE O/P EST LOW 20 MIN: CPT | Mod: PBBFAC | Performed by: INTERNAL MEDICINE

## 2017-03-30 PROCEDURE — 70486 CT MAXILLOFACIAL W/O DYE: CPT | Mod: 26,,, | Performed by: RADIOLOGY

## 2017-03-30 PROCEDURE — 99214 OFFICE O/P EST MOD 30 MIN: CPT | Mod: 25,S$PBB,, | Performed by: INTERNAL MEDICINE

## 2017-03-30 PROCEDURE — 71020 XR CHEST PA AND LATERAL: CPT | Mod: 26,,, | Performed by: RADIOLOGY

## 2017-03-30 RX ORDER — PREDNISONE 5 MG/1
5 TABLET ORAL DAILY
COMMUNITY
End: 2018-07-28 | Stop reason: SDUPTHER

## 2017-03-30 NOTE — TELEPHONE ENCOUNTER
Spoke to patient. Informed of surgery arrival time for 04/03/17 with Dr. Olsen of 10:00 AM. Verbalized understanding. Call PRN.

## 2017-03-31 ENCOUNTER — ANESTHESIA EVENT (OUTPATIENT)
Dept: SURGERY | Facility: HOSPITAL | Age: 27
DRG: 135 | End: 2017-03-31
Payer: MEDICAID

## 2017-03-31 DIAGNOSIS — E56.9 VITAMIN DEFICIENCY: ICD-10-CM

## 2017-03-31 RX ORDER — MORPHINE SULFATE 15 MG/1
15 TABLET, FILM COATED, EXTENDED RELEASE ORAL 2 TIMES DAILY
COMMUNITY
End: 2017-06-29 | Stop reason: ALTCHOICE

## 2017-03-31 NOTE — ANESTHESIA PREPROCEDURE EVALUATION
03/31/2017  Juanita Ibarra is a 27 y.o., female.  Patient has PONV with past anesthesia if gas is used. Will have itching post op with IV anesthetics/ resolves with benadryl..  Patient Active Problem List   Diagnosis    Cystic fibrosis with pulmonary manifestations    Sinusitis, chronic    Anxiety disorder    Other pain disorders related to psychological factors    Nausea    Protein calorie malnutrition    CF related Pancreatic insufficiency    Chronic pain with opiate use    Allergy to multiple antibiotics    Dysphagia, oropharyngeal    Chronic visceral pain (pleura)    Transaminitis    Gallstones    Hyperglycemia    Adrenal cortical steroids causing adverse effect in therapeutic use    Immunosuppression    Lung transplant status, bilateral    Dysphonia    Constipation    Portacath in place    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    Prophylactic antibiotic    Complication of transplanted lung    Acute rejection of lung transplant    Debility    Muscle spasm of back    Anemia    Headache    Aftercare following organ transplant    Leukopenia    SBO (small bowel obstruction)    Sterilization    Lung replaced by transplant    Fever    Cytomegalovirus (CMV) viremia    Pneumonia    Hyperkalemia    Acute kidney injury    Other complications of lung transplant    S/P lung transplant    Bilious vomiting with nausea    Acute intractable headache    Pneumonia, organism unspecified    Lymphadenopathy    Biliary colic    Unilateral complete vocal fold paralysis    Bronchiolitis obliterans syndrome       [facility] Anesthesia Evaluation    I have reviewed the Patient Summary Reports.     I have reviewed the Medications.     Review of Systems  Anesthesia Hx:  Hx of Anesthetic complications Hx of PONV  History of prior surgery of interest to airway management or  planning: Personal Hx of Anesthesia complications, Post-Operative Nausea/Vomiting   Hematology/Oncology:  Hematology Normal   Oncology Normal     EENT/Dental:   Hx of vocal cord dsyfunction following laryngoscopy, s/p repair this summer   Cardiovascular:  Cardiovascular Normal     Pulmonary:   COPD S/p bilateral lung transplant    Renal/:   Chronic Renal Disease, CRI    Hepatic/GI:  Hepatic/GI Normal    Musculoskeletal:  Musculoskeletal Normal    Neurological:   Headaches    Endocrine:  Endocrine Normal    Dermatological:  Skin Normal    Psych:   Psychiatric History          Physical Exam  General:  Well nourished    Airway/Jaw/Neck:  Airway Findings: Mouth Opening: Normal Tongue: Normal  General Airway Assessment: Adult  Mallampati: I  Jaw/Neck Findings:  Neck ROM: Normal ROM      Dental:  Dental Findings: In tact   Chest/Lungs:  Chest/Lungs Findings: Clear to auscultation     Heart/Vascular:  Heart Findings: Rate: Normal  Rhythm: Regular Rhythm  Sounds: Normal        Mental Status:  Mental Status Findings:  Cooperative         Anesthesia Plan  Type of Anesthesia, risks & benefits discussed:  Anesthesia Type:  general  Patient's Preference: general   Intra-op Monitoring Plan:   Intra-op Monitoring Plan Comments:   Post Op Pain Control Plan:   Post Op Pain Control Plan Comments:   Induction:   IV  Beta Blocker:  Patient is not currently on a Beta-Blocker (No further documentation required).       Informed Consent: Patient understands risks and agrees with Anesthesia plan.  Questions answered. Anesthesia consent signed with patient.  ASA Score: 3     Day of Surgery Review of History & Physical:    H&P update referred to the surgeon.         Ready For Surgery From Anesthesia Perspective.

## 2017-03-31 NOTE — PROGRESS NOTES
LUNG TRANSPLANT RECIPIENT FOLLOW-UP    Reason for Visit: Follow-up after lung transplantation.     Date of Transplant: 6/12/14    Reason for Transplant: Cystic fibrosis     Type of Transplant: bilateral sequential lung    CMV Status: D+ / R-     Major Complications:   1. Vocal cord dysfunction- resolved   2. Early readmission due to MRSA pulmonary infection with persistent positive cultures   3. A1 Rejection 8/2014   4. C glabrata positive sputum-recurrent  5. Pseudomonas pneumonia in 02/2015 resolved   6. 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 CARLOS EDUARDO (grade 3)                                                                               History of Present Illness: Juanita Ibarra is a 27 y.o. year old female with a transplant history as outlined above. She presents today for follow up of a recent admission (local) for headaches and in preparation for FESS and hysterectomy in the coming weeks.    She was admitted about a week ago for severe headaches at a local hospital. Her workup was negative for infection and it was determined that her chronic sinusitis was the culprit.     She has also been seen by local pain physician for her chronic chest pain. He changed her IR oxycodone for MS Contin and she says it does not help as well as oxycodone does.    She has gained weight after she started to take hormones to control her metrorrhagia. She is scheduled for hysterectomy in the coming week.    She says that she continues to have a sporadic cough that sometimes will bring up phlegm. Will develop shortness of breath on heavy exertion. Her oxygenation remains normal.    Review of Systems   Constitutional: Negative for chills, fever and malaise/fatigue.        Weight gain   HENT: Negative for congestion, ear discharge, ear pain and sore throat.    Eyes: Negative for blurred vision and pain.   Respiratory: Positive for cough (sporadic). Negative for hemoptysis, sputum production, shortness of breath, wheezing and stridor.   "  Cardiovascular: Negative for chest pain, palpitations, orthopnea, claudication and leg swelling.   Gastrointestinal: Negative for abdominal pain, nausea and vomiting.   Genitourinary: Negative for dysuria, frequency, hematuria and urgency.   Musculoskeletal: Positive for myalgias (chronic  left sided chest pain). Negative for back pain.   Skin: Negative for itching and rash.   Neurological: Positive for headaches. Negative for tingling, tremors, focal weakness and seizures.   Psychiatric/Behavioral: Negative for depression and suicidal ideas. The patient is not nervous/anxious.      BP (!) 138/91 (BP Location: Left arm, Patient Position: Sitting, BP Method: Automatic)  Pulse (!) 121  Temp 98.3 °F (36.8 °C) (Oral)   Resp 20  Ht 5' 1" (1.549 m)  Wt 54 kg (119 lb)  SpO2 99%  BMI 22.48 kg/m2    Physical Exam   Constitutional: She is oriented to person, place, and time. No distress.   HENT:   Head: Normocephalic and atraumatic.   Eyes: Conjunctivae are normal. Pupils are equal, round, and reactive to light. No scleral icterus.   Neck: Neck supple. No JVD present. No tracheal deviation present. No thyromegaly present.   Cardiovascular: Normal rate.  Exam reveals no gallop and no friction rub.    Pulmonary/Chest: Effort normal and breath sounds normal. No stridor. No respiratory distress. She has no wheezes. She has no rales.   Abdominal: Soft. Bowel sounds are normal.   Musculoskeletal: She exhibits no edema.   Neurological: She is alert and oriented to person, place, and time. GCS score is 15.   Skin: Skin is warm. She is not diaphoretic. No erythema. No pallor.   Psychiatric: Mood and affect normal.     Labs:  Results for orders placed or performed in visit on 03/30/17   CBC W/ AUTO DIFFERENTIAL   Result Value Ref Range    WBC 5.88 3.90 - 12.70 K/uL    RBC 4.19 4.00 - 5.40 M/uL    Hemoglobin 12.0 12.0 - 16.0 g/dL    Hematocrit 37.7 37.0 - 48.5 %    MCV 90 82 - 98 fL    MCH 28.6 27.0 - 31.0 pg    MCHC 31.8 (L) " 32.0 - 36.0 %    RDW 16.4 (H) 11.5 - 14.5 %    Platelets 123 (L) 150 - 350 K/uL    MPV 10.4 9.2 - 12.9 fL    Gran # 3.3 1.8 - 7.7 K/uL    Lymph # 2.2 1.0 - 4.8 K/uL    Mono # 0.3 0.3 - 1.0 K/uL    Eos # 0.2 0.0 - 0.5 K/uL    Baso # 0.01 0.00 - 0.20 K/uL    Gran% 56.0 38.0 - 73.0 %    Lymph% 36.6 18.0 - 48.0 %    Mono% 4.3 4.0 - 15.0 %    Eosinophil% 2.7 0.0 - 8.0 %    Basophil% 0.2 0.0 - 1.9 %    Differential Method Automated    Comprehensive metabolic panel   Result Value Ref Range    Sodium 141 136 - 145 mmol/L    Potassium 3.6 3.5 - 5.1 mmol/L    Chloride 110 95 - 110 mmol/L    CO2 20 (L) 23 - 29 mmol/L    Glucose 107 70 - 110 mg/dL    BUN, Bld 26 (H) 6 - 20 mg/dL    Creatinine 1.3 0.5 - 1.4 mg/dL    Calcium 9.2 8.7 - 10.5 mg/dL    Total Protein 7.6 6.0 - 8.4 g/dL    Albumin 4.1 3.5 - 5.2 g/dL    Total Bilirubin 0.4 0.1 - 1.0 mg/dL    Alkaline Phosphatase 168 (H) 55 - 135 U/L    AST 55 (H) 10 - 40 U/L     (H) 10 - 44 U/L    Anion Gap 11 8 - 16 mmol/L    eGFR if African American >60.0 >60 mL/min/1.73 m^2    eGFR if non  56.0 (A) >60 mL/min/1.73 m^2   Magnesium   Result Value Ref Range    Magnesium 1.8 1.6 - 2.6 mg/dL   Tacrolimus level   Result Value Ref Range    Tacrolimus Lvl 8.1 5.0 - 15.0 ng/mL     *Note: Due to a large number of results and/or encounters for the requested time period, some results have not been displayed. A complete set of results can be found in Results Review.     Tacrolimus Lvl   Date Value Ref Range Status   03/30/2017 8.1 5.0 - 15.0 ng/mL Final     Comment:     Testing performed by Liquid Chromatography-Tandem  Mass Spectrometry (LC-MS/MS).  This test was developed and its performance characteristics  determined by [facility], Department of Pathology  and Laboratory Medicine in a manner consistent with CLIA  requirements. It has not been cleared or approved by the [place]  Food and Drug Administration.  This test is used for clinical   purposes.  It should  not be regarded as investigational or for  research.     03/06/2017 4.7 (L) 5.0 - 15.0 ng/mL Final     Comment:     Testing performed by Liquid Chromatography-Tandem  Mass Spectrometry (LC-MS/MS).  This test was developed and its performance characteristics  determined by [facility], Department of Pathology  and Laboratory Medicine in a manner consistent with CLIA  requirements. It has not been cleared or approved by the [place]  Food and Drug Administration.  This test is used for clinical   purposes.  It should not be regarded as investigational or for  research.     12/22/2016 3.5 (L) 5.0 - 15.0 ng/mL Final     Comment:     Testing performed by Liquid Chromatography-Tandem  Mass Spectrometry (LC-MS/MS).  This test was developed and its performance characteristics  determined by [facility], Department of Pathology  and Laboratory Medicine in a manner consistent with CLIA  requirements. It has not been cleared or approved by the [place]  Food and Drug Administration.  This test is used for clinical   purposes.  It should not be regarded as investigational or for  research.     12/21/2016 4.5 (L) 5.0 - 15.0 ng/mL Final     Comment:     Testing performed by Liquid Chromatography-Tandem  Mass Spectrometry (LC-MS/MS).  This test was developed and its performance characteristics  determined by [facility], Department of Pathology  and Laboratory Medicine in a manner consistent with CLIA  requirements. It has not been cleared or approved by the [place]  Food and Drug Administration.  This test is used for clinical   purposes.  It should not be regarded as investigational or for  research.       Pulmonary Function Tests 3/30/2017 3/6/2017 2/2/2017 12/29/2016 12/19/2016 9/21/2016 8/11/2016   FVC 2.35 2.46 2.6 2.75 2.54 2.83 2.56   FEV1 0.93 0.95 1.01 1.07 1.03 1.4 1.34   TLC (liters) - - - - - - -   FVC% 73 76 80 85 78 91 79   FEV1% 33 34 36 38 36 51 47   FEF 25-75 0.33 0.33 0.4 0.36 0.39 0.56 0.59    FEF 25-75% 10 10 12 10 11 17 17   TLC% - - - - - - -   RV - - - - - - -   RV% - - - - - - -     Imaging:  Results for orders placed during the hospital encounter of 05/19/16   X-Ray Chest PA And Lateral    Narrative Patient had a previous lung transplant.  Vascular catheter is seen with its tip in the superior vena cava and wire sutures are noted in the sternum.  Lungs are clear well-expanded with no infiltrate or pleural effusion.    Impression  Satisfactory postoperative findings.  ______________________________________     Electronically signed by: David Rojo MD  Date:     05/19/16  Time:    09:10      Assessment:  1. Encounter following organ transplant    2. Bronchiolitis obliterans syndrome    3. Immunosuppression    4. Prophylactic antibiotic    5. Pre-op evaluation      Plan:   1. Her pulmonary functions continue to be consistent with severe CARLOS EDUARDO but she has minimal symptoms and does not require oxygen. Will continue to monitor every 3-4 months.    2. Continue tacrolimus, mycophenolate and prednisone.     3. Continue dapsone.    4. There are no contraindications from pulmonary standpoint for FESS and hysterectomy. She will likely take longer to recover her oxygenation to baseline and I would recommend at least 1 night overnight admission after her surgeries. I am available to admit her under my service after her surgeries.    5. RTC in 3-4 months

## 2017-03-31 NOTE — PRE-PROCEDURE INSTRUCTIONS
Preop instructions: NPO after midnight or 8 hours prior to procedure time, shower instructions, directions, leave all valuables at home, medication instructions for PM prior & am of procedure explained. Patient stated an understanding.    Patient has PONV with past anesthesia if gas is used. Will have itching post op with IV anesthetics/ resolves with benadryl.    Aunt has Medical Power of . On chart here at [facility].

## 2017-04-03 ENCOUNTER — HOSPITAL ENCOUNTER (INPATIENT)
Facility: HOSPITAL | Age: 27
LOS: 2 days | Discharge: HOME | DRG: 135 | End: 2017-04-05
Attending: OTOLARYNGOLOGY | Admitting: OTOLARYNGOLOGY
Payer: MEDICAID

## 2017-04-03 ENCOUNTER — ANESTHESIA (OUTPATIENT)
Dept: SURGERY | Facility: HOSPITAL | Age: 27
DRG: 135 | End: 2017-04-03
Payer: MEDICAID

## 2017-04-03 DIAGNOSIS — J32.2 CHRONIC ETHMOIDAL SINUSITIS: ICD-10-CM

## 2017-04-03 DIAGNOSIS — E84.0 CYSTIC FIBROSIS WITH PULMONARY MANIFESTATIONS: Primary | ICD-10-CM

## 2017-04-03 LAB
GRAM STN SPEC: NORMAL
POCT GLUCOSE: 110 MG/DL (ref 70–110)

## 2017-04-03 PROCEDURE — 63600175 PHARM REV CODE 636 W HCPCS: Performed by: NURSE ANESTHETIST, CERTIFIED REGISTERED

## 2017-04-03 PROCEDURE — 63600175 PHARM REV CODE 636 W HCPCS: Performed by: OTOLARYNGOLOGY

## 2017-04-03 PROCEDURE — 87075 CULTR BACTERIA EXCEPT BLOOD: CPT

## 2017-04-03 PROCEDURE — 63600175 PHARM REV CODE 636 W HCPCS: Performed by: NURSE PRACTITIONER

## 2017-04-03 PROCEDURE — 36000710: Performed by: OTOLARYNGOLOGY

## 2017-04-03 PROCEDURE — 31288 NASAL/SINUS ENDOSCOPY SURG: CPT | Mod: 50,51,, | Performed by: OTOLARYNGOLOGY

## 2017-04-03 PROCEDURE — 09CX4ZZ EXTIRPATION OF MATTER FROM LEFT SPHENOID SINUS, PERCUTANEOUS ENDOSCOPIC APPROACH: ICD-10-PCS | Performed by: OTOLARYNGOLOGY

## 2017-04-03 PROCEDURE — 25000003 PHARM REV CODE 250: Performed by: NURSE PRACTITIONER

## 2017-04-03 PROCEDURE — 25000003 PHARM REV CODE 250: Performed by: OTOLARYNGOLOGY

## 2017-04-03 PROCEDURE — 09TV4ZZ RESECTION OF LEFT ETHMOID SINUS, PERCUTANEOUS ENDOSCOPIC APPROACH: ICD-10-PCS | Performed by: OTOLARYNGOLOGY

## 2017-04-03 PROCEDURE — 25000003 PHARM REV CODE 250: Performed by: NURSE ANESTHETIST, CERTIFIED REGISTERED

## 2017-04-03 PROCEDURE — 099R4ZZ DRAINAGE OF LEFT MAXILLARY SINUS, PERCUTANEOUS ENDOSCOPIC APPROACH: ICD-10-PCS | Performed by: OTOLARYNGOLOGY

## 2017-04-03 PROCEDURE — 27201423 OPTIME MED/SURG SUP & DEVICES STERILE SUPPLY: Performed by: OTOLARYNGOLOGY

## 2017-04-03 PROCEDURE — D9220A PRA ANESTHESIA: Mod: CRNA,,, | Performed by: NURSE ANESTHETIST, CERTIFIED REGISTERED

## 2017-04-03 PROCEDURE — 36000711: Performed by: OTOLARYNGOLOGY

## 2017-04-03 PROCEDURE — 87186 SC STD MICRODIL/AGAR DIL: CPT | Mod: 59

## 2017-04-03 PROCEDURE — 88305 TISSUE EXAM BY PATHOLOGIST: CPT | Performed by: PATHOLOGY

## 2017-04-03 PROCEDURE — 71000039 HC RECOVERY, EACH ADD'L HOUR: Performed by: OTOLARYNGOLOGY

## 2017-04-03 PROCEDURE — 31299 UNLISTED PX ACCESSORY SINUS: CPT | Mod: ,,, | Performed by: OTOLARYNGOLOGY

## 2017-04-03 PROCEDURE — 87015 SPECIMEN INFECT AGNT CONCNTJ: CPT | Mod: 91

## 2017-04-03 PROCEDURE — 82962 GLUCOSE BLOOD TEST: CPT | Performed by: OTOLARYNGOLOGY

## 2017-04-03 PROCEDURE — 25000003 PHARM REV CODE 250: Performed by: ANESTHESIOLOGY

## 2017-04-03 PROCEDURE — 31255 NSL/SINS NDSC W/TOT ETHMDCT: CPT | Mod: 50,51,, | Performed by: OTOLARYNGOLOGY

## 2017-04-03 PROCEDURE — D9220A PRA ANESTHESIA: Mod: ANES,,, | Performed by: ANESTHESIOLOGY

## 2017-04-03 PROCEDURE — 11000001 HC ACUTE MED/SURG PRIVATE ROOM

## 2017-04-03 PROCEDURE — 61782 SCAN PROC CRANIAL EXTRA: CPT | Mod: ,,, | Performed by: OTOLARYNGOLOGY

## 2017-04-03 PROCEDURE — 87070 CULTURE OTHR SPECIMN AEROBIC: CPT

## 2017-04-03 PROCEDURE — 63600175 PHARM REV CODE 636 W HCPCS

## 2017-04-03 PROCEDURE — 31267 ENDOSCOPY MAXILLARY SINUS: CPT | Mod: 50,51,, | Performed by: OTOLARYNGOLOGY

## 2017-04-03 PROCEDURE — 88305 TISSUE EXAM BY PATHOLOGIST: CPT | Mod: 26,,, | Performed by: PATHOLOGY

## 2017-04-03 PROCEDURE — 37000009 HC ANESTHESIA EA ADD 15 MINS: Performed by: OTOLARYNGOLOGY

## 2017-04-03 PROCEDURE — 37000008 HC ANESTHESIA 1ST 15 MINUTES: Performed by: OTOLARYNGOLOGY

## 2017-04-03 PROCEDURE — 31276 NSL/SINS NDSC FRNT TISS RMVL: CPT | Mod: 50,,, | Performed by: OTOLARYNGOLOGY

## 2017-04-03 PROCEDURE — 09BT4ZZ EXCISION OF LEFT FRONTAL SINUS, PERCUTANEOUS ENDOSCOPIC APPROACH: ICD-10-PCS | Performed by: OTOLARYNGOLOGY

## 2017-04-03 PROCEDURE — 71000033 HC RECOVERY, INTIAL HOUR: Performed by: OTOLARYNGOLOGY

## 2017-04-03 PROCEDURE — 87116 MYCOBACTERIA CULTURE: CPT | Mod: 59

## 2017-04-03 PROCEDURE — 27800903 OPTIME MED/SURG SUP & DEVICES OTHER IMPLANTS: Performed by: OTOLARYNGOLOGY

## 2017-04-03 PROCEDURE — 09TU4ZZ RESECTION OF RIGHT ETHMOID SINUS, PERCUTANEOUS ENDOSCOPIC APPROACH: ICD-10-PCS | Performed by: OTOLARYNGOLOGY

## 2017-04-03 PROCEDURE — 87102 FUNGUS ISOLATION CULTURE: CPT | Mod: 59

## 2017-04-03 PROCEDURE — 87077 CULTURE AEROBIC IDENTIFY: CPT | Mod: 59

## 2017-04-03 PROCEDURE — 87205 SMEAR GRAM STAIN: CPT | Mod: 91

## 2017-04-03 PROCEDURE — 09CW4ZZ EXTIRPATION OF MATTER FROM RIGHT SPHENOID SINUS, PERCUTANEOUS ENDOSCOPIC APPROACH: ICD-10-PCS | Performed by: OTOLARYNGOLOGY

## 2017-04-03 PROCEDURE — 09BS4ZZ EXCISION OF RIGHT FRONTAL SINUS, PERCUTANEOUS ENDOSCOPIC APPROACH: ICD-10-PCS | Performed by: OTOLARYNGOLOGY

## 2017-04-03 PROCEDURE — 8E09XBZ COMPUTER ASSISTED PROCEDURE OF HEAD AND NECK REGION: ICD-10-PCS | Performed by: OTOLARYNGOLOGY

## 2017-04-03 PROCEDURE — 99232 SBSQ HOSP IP/OBS MODERATE 35: CPT | Mod: 25,,, | Performed by: OTOLARYNGOLOGY

## 2017-04-03 PROCEDURE — 63600175 PHARM REV CODE 636 W HCPCS: Performed by: ANESTHESIOLOGY

## 2017-04-03 DEVICE — IMPLANT PROPEL MOMETASONE: Type: IMPLANTABLE DEVICE | Site: NOSE | Status: FUNCTIONAL

## 2017-04-03 RX ORDER — ONDANSETRON 2 MG/ML
4 INJECTION INTRAMUSCULAR; INTRAVENOUS DAILY PRN
Status: DISCONTINUED | OUTPATIENT
Start: 2017-04-03 | End: 2017-04-03 | Stop reason: HOSPADM

## 2017-04-03 RX ORDER — POTASSIUM CHLORIDE 20 MEQ/15ML
40 SOLUTION ORAL
Status: DISCONTINUED | OUTPATIENT
Start: 2017-04-03 | End: 2017-04-05 | Stop reason: HOSPADM

## 2017-04-03 RX ORDER — PROPOFOL 10 MG/ML
VIAL (ML) INTRAVENOUS
Status: DISCONTINUED | OUTPATIENT
Start: 2017-04-03 | End: 2017-04-03

## 2017-04-03 RX ORDER — CLINDAMYCIN PHOSPHATE 900 MG/50ML
INJECTION, SOLUTION INTRAVENOUS
Status: DISCONTINUED | OUTPATIENT
Start: 2017-04-03 | End: 2017-04-03

## 2017-04-03 RX ORDER — HYDROMORPHONE HYDROCHLORIDE 1 MG/ML
INJECTION, SOLUTION INTRAMUSCULAR; INTRAVENOUS; SUBCUTANEOUS
Status: COMPLETED
Start: 2017-04-03 | End: 2017-04-03

## 2017-04-03 RX ORDER — BUTALBITAL, ACETAMINOPHEN AND CAFFEINE 50; 325; 40 MG/1; MG/1; MG/1
1 TABLET ORAL EVERY 6 HOURS PRN
Status: DISCONTINUED | OUTPATIENT
Start: 2017-04-03 | End: 2017-04-05 | Stop reason: HOSPADM

## 2017-04-03 RX ORDER — POLYETHYLENE GLYCOL 3350 17 G/17G
17 POWDER, FOR SOLUTION ORAL 2 TIMES DAILY
Status: DISCONTINUED | OUTPATIENT
Start: 2017-04-03 | End: 2017-04-05 | Stop reason: HOSPADM

## 2017-04-03 RX ORDER — DIPHENHYDRAMINE HYDROCHLORIDE 50 MG/ML
INJECTION INTRAMUSCULAR; INTRAVENOUS
Status: COMPLETED
Start: 2017-04-03 | End: 2017-04-03

## 2017-04-03 RX ORDER — DAPSONE 100 MG/1
100 TABLET ORAL DAILY
Status: DISCONTINUED | OUTPATIENT
Start: 2017-04-04 | End: 2017-04-05 | Stop reason: HOSPADM

## 2017-04-03 RX ORDER — LEVALBUTEROL 1.25 MG/.5ML
1.25 SOLUTION, CONCENTRATE RESPIRATORY (INHALATION) EVERY 8 HOURS PRN
Status: DISCONTINUED | OUTPATIENT
Start: 2017-04-03 | End: 2017-04-05 | Stop reason: HOSPADM

## 2017-04-03 RX ORDER — INSULIN GLARGINE 300 [IU]/ML
5 INJECTION, SOLUTION SUBCUTANEOUS DAILY
Status: DISCONTINUED | OUTPATIENT
Start: 2017-04-04 | End: 2017-04-05 | Stop reason: HOSPADM

## 2017-04-03 RX ORDER — REMIFENTANIL HYDROCHLORIDE 1 MG/ML
INJECTION, POWDER, LYOPHILIZED, FOR SOLUTION INTRAVENOUS CONTINUOUS PRN
Status: DISCONTINUED | OUTPATIENT
Start: 2017-04-03 | End: 2017-04-03

## 2017-04-03 RX ORDER — MAGNESIUM SULFATE HEPTAHYDRATE 40 MG/ML
2 INJECTION, SOLUTION INTRAVENOUS
Status: DISCONTINUED | OUTPATIENT
Start: 2017-04-03 | End: 2017-04-05 | Stop reason: HOSPADM

## 2017-04-03 RX ORDER — ONDANSETRON 2 MG/ML
4 INJECTION INTRAMUSCULAR; INTRAVENOUS DAILY PRN
Status: DISCONTINUED | OUTPATIENT
Start: 2017-04-03 | End: 2017-04-05 | Stop reason: HOSPADM

## 2017-04-03 RX ORDER — INSULIN GLARGINE 300 [IU]/ML
3 INJECTION, SOLUTION SUBCUTANEOUS DAILY
Status: ON HOLD | COMMUNITY
End: 2018-01-01

## 2017-04-03 RX ORDER — ARIPIPRAZOLE 2 MG/1
2 TABLET ORAL DAILY
Status: DISCONTINUED | OUTPATIENT
Start: 2017-04-04 | End: 2017-04-05 | Stop reason: HOSPADM

## 2017-04-03 RX ORDER — KETAMINE HCL IN 0.9 % NACL 50 MG/5 ML
SYRINGE (ML) INTRAVENOUS
Status: DISCONTINUED | OUTPATIENT
Start: 2017-04-03 | End: 2017-04-03

## 2017-04-03 RX ORDER — GABAPENTIN 300 MG/1
300 CAPSULE ORAL 3 TIMES DAILY
Status: DISCONTINUED | OUTPATIENT
Start: 2017-04-03 | End: 2017-04-05 | Stop reason: HOSPADM

## 2017-04-03 RX ORDER — DIPHENHYDRAMINE HYDROCHLORIDE 50 MG/ML
25 INJECTION INTRAMUSCULAR; INTRAVENOUS EVERY 4 HOURS PRN
Status: DISCONTINUED | OUTPATIENT
Start: 2017-04-03 | End: 2017-04-04

## 2017-04-03 RX ORDER — PROPOFOL 10 MG/ML
VIAL (ML) INTRAVENOUS CONTINUOUS PRN
Status: DISCONTINUED | OUTPATIENT
Start: 2017-04-03 | End: 2017-04-03

## 2017-04-03 RX ORDER — PANTOPRAZOLE SODIUM 40 MG/1
40 TABLET, DELAYED RELEASE ORAL DAILY
Status: DISCONTINUED | OUTPATIENT
Start: 2017-04-04 | End: 2017-04-04

## 2017-04-03 RX ORDER — HYDROMORPHONE HYDROCHLORIDE 1 MG/ML
1 INJECTION, SOLUTION INTRAMUSCULAR; INTRAVENOUS; SUBCUTANEOUS EVERY 4 HOURS PRN
Status: DISCONTINUED | OUTPATIENT
Start: 2017-04-03 | End: 2017-04-03

## 2017-04-03 RX ORDER — FENTANYL CITRATE 50 UG/ML
INJECTION, SOLUTION INTRAMUSCULAR; INTRAVENOUS
Status: DISCONTINUED | OUTPATIENT
Start: 2017-04-03 | End: 2017-04-03

## 2017-04-03 RX ORDER — SODIUM CHLORIDE 0.9 % (FLUSH) 0.9 %
3 SYRINGE (ML) INJECTION
Status: DISCONTINUED | OUTPATIENT
Start: 2017-04-03 | End: 2017-04-05 | Stop reason: HOSPADM

## 2017-04-03 RX ORDER — HYDROMORPHONE HYDROCHLORIDE 1 MG/ML
1.5 INJECTION, SOLUTION INTRAMUSCULAR; INTRAVENOUS; SUBCUTANEOUS
Status: DISCONTINUED | OUTPATIENT
Start: 2017-04-03 | End: 2017-04-05 | Stop reason: HOSPADM

## 2017-04-03 RX ORDER — MAG HYDROX/ALUMINUM HYD/SIMETH 200-200-20
30 SUSPENSION, ORAL (FINAL DOSE FORM) ORAL
Status: DISCONTINUED | OUTPATIENT
Start: 2017-04-03 | End: 2017-04-05 | Stop reason: HOSPADM

## 2017-04-03 RX ORDER — MYCOPHENOLATE MOFETIL 250 MG/1
500 CAPSULE ORAL 2 TIMES DAILY
Status: DISCONTINUED | OUTPATIENT
Start: 2017-04-03 | End: 2017-04-05 | Stop reason: HOSPADM

## 2017-04-03 RX ORDER — SODIUM CHLORIDE 9 MG/ML
INJECTION, SOLUTION INTRAVENOUS CONTINUOUS PRN
Status: DISCONTINUED | OUTPATIENT
Start: 2017-04-03 | End: 2017-04-03

## 2017-04-03 RX ORDER — FLUTICASONE PROPIONATE 50 MCG
2 SPRAY, SUSPENSION (ML) NASAL DAILY
Status: DISCONTINUED | OUTPATIENT
Start: 2017-04-04 | End: 2017-04-05 | Stop reason: HOSPADM

## 2017-04-03 RX ORDER — TIZANIDINE 4 MG/1
8 TABLET ORAL EVERY 6 HOURS PRN
Status: DISCONTINUED | OUTPATIENT
Start: 2017-04-03 | End: 2017-04-05 | Stop reason: HOSPADM

## 2017-04-03 RX ORDER — PROMETHAZINE HYDROCHLORIDE 25 MG/1
25 TABLET ORAL EVERY 6 HOURS PRN
Status: DISCONTINUED | OUTPATIENT
Start: 2017-04-03 | End: 2017-04-03

## 2017-04-03 RX ORDER — LIDOCAINE HYDROCHLORIDE AND EPINEPHRINE 10; 10 MG/ML; UG/ML
INJECTION, SOLUTION INFILTRATION; PERINEURAL
Status: DISCONTINUED | OUTPATIENT
Start: 2017-04-03 | End: 2017-04-03 | Stop reason: HOSPADM

## 2017-04-03 RX ORDER — AZITHROMYCIN 250 MG/1
500 TABLET, FILM COATED ORAL
Status: DISCONTINUED | OUTPATIENT
Start: 2017-04-03 | End: 2017-04-05 | Stop reason: HOSPADM

## 2017-04-03 RX ORDER — MUPIROCIN 20 MG/G
OINTMENT TOPICAL
Status: DISCONTINUED | OUTPATIENT
Start: 2017-04-03 | End: 2017-04-03 | Stop reason: HOSPADM

## 2017-04-03 RX ORDER — DEXAMETHASONE SODIUM PHOSPHATE 4 MG/ML
8 INJECTION, SOLUTION INTRA-ARTICULAR; INTRALESIONAL; INTRAMUSCULAR; INTRAVENOUS; SOFT TISSUE EVERY 8 HOURS
Status: DISCONTINUED | OUTPATIENT
Start: 2017-04-03 | End: 2017-04-05 | Stop reason: HOSPADM

## 2017-04-03 RX ORDER — MIDAZOLAM HYDROCHLORIDE 1 MG/ML
INJECTION, SOLUTION INTRAMUSCULAR; INTRAVENOUS
Status: DISCONTINUED | OUTPATIENT
Start: 2017-04-03 | End: 2017-04-03

## 2017-04-03 RX ORDER — SUCCINYLCHOLINE CHLORIDE 20 MG/ML
INJECTION INTRAMUSCULAR; INTRAVENOUS
Status: DISCONTINUED | OUTPATIENT
Start: 2017-04-03 | End: 2017-04-03

## 2017-04-03 RX ORDER — MIRTAZAPINE 15 MG/1
30 TABLET, FILM COATED ORAL NIGHTLY
Status: DISCONTINUED | OUTPATIENT
Start: 2017-04-03 | End: 2017-04-05 | Stop reason: HOSPADM

## 2017-04-03 RX ORDER — POTASSIUM CHLORIDE 20 MEQ/15ML
60 SOLUTION ORAL
Status: DISCONTINUED | OUTPATIENT
Start: 2017-04-03 | End: 2017-04-05 | Stop reason: HOSPADM

## 2017-04-03 RX ORDER — HYDROMORPHONE HYDROCHLORIDE 1 MG/ML
0.2 INJECTION, SOLUTION INTRAMUSCULAR; INTRAVENOUS; SUBCUTANEOUS EVERY 5 MIN PRN
Status: COMPLETED | OUTPATIENT
Start: 2017-04-03 | End: 2017-04-03

## 2017-04-03 RX ORDER — ROCURONIUM BROMIDE 10 MG/ML
INJECTION, SOLUTION INTRAVENOUS
Status: DISCONTINUED | OUTPATIENT
Start: 2017-04-03 | End: 2017-04-03

## 2017-04-03 RX ORDER — ONDANSETRON 2 MG/ML
INJECTION INTRAMUSCULAR; INTRAVENOUS
Status: DISCONTINUED | OUTPATIENT
Start: 2017-04-03 | End: 2017-04-03

## 2017-04-03 RX ORDER — PREDNISONE 5 MG/1
5 TABLET ORAL DAILY
Status: DISCONTINUED | OUTPATIENT
Start: 2017-04-03 | End: 2017-04-05 | Stop reason: HOSPADM

## 2017-04-03 RX ORDER — EPINEPHRINE 1 MG/ML
INJECTION, SOLUTION INTRACARDIAC; INTRAMUSCULAR; INTRAVENOUS; SUBCUTANEOUS
Status: DISCONTINUED | OUTPATIENT
Start: 2017-04-03 | End: 2017-04-03 | Stop reason: HOSPADM

## 2017-04-03 RX ORDER — ACETAMINOPHEN 325 MG/1
650 TABLET ORAL EVERY 6 HOURS PRN
Status: DISCONTINUED | OUTPATIENT
Start: 2017-04-03 | End: 2017-04-05 | Stop reason: HOSPADM

## 2017-04-03 RX ADMIN — HYDROMORPHONE HYDROCHLORIDE 1.5 MG: 1 INJECTION, SOLUTION INTRAMUSCULAR; INTRAVENOUS; SUBCUTANEOUS at 05:04

## 2017-04-03 RX ADMIN — DEXAMETHASONE SODIUM PHOSPHATE 8 MG: 4 INJECTION, SOLUTION INTRAMUSCULAR; INTRAVENOUS at 08:04

## 2017-04-03 RX ADMIN — PROMETHAZINE HYDROCHLORIDE 25 MG: 25 INJECTION, SOLUTION INTRAMUSCULAR; INTRAVENOUS at 09:04

## 2017-04-03 RX ADMIN — HYDROMORPHONE HYDROCHLORIDE 0.2 MG: 1 INJECTION, SOLUTION INTRAMUSCULAR; INTRAVENOUS; SUBCUTANEOUS at 03:04

## 2017-04-03 RX ADMIN — DIPHENHYDRAMINE HYDROCHLORIDE 25 MG: 50 INJECTION, SOLUTION INTRAMUSCULAR; INTRAVENOUS at 11:04

## 2017-04-03 RX ADMIN — TACROLIMUS 3.5 MG: 1 CAPSULE, GELATIN COATED ORAL at 06:04

## 2017-04-03 RX ADMIN — DIPHENHYDRAMINE HYDROCHLORIDE 25 MG: 50 INJECTION, SOLUTION INTRAMUSCULAR; INTRAVENOUS at 07:04

## 2017-04-03 RX ADMIN — FENTANYL CITRATE 100 MCG: 50 INJECTION, SOLUTION INTRAMUSCULAR; INTRAVENOUS at 11:04

## 2017-04-03 RX ADMIN — PROMETHAZINE HYDROCHLORIDE 6.25 MG: 25 INJECTION, SOLUTION INTRAMUSCULAR; INTRAVENOUS at 05:04

## 2017-04-03 RX ADMIN — Medication 0.05 MCG/KG/MIN: at 12:04

## 2017-04-03 RX ADMIN — ONDANSETRON 4 MG: 2 INJECTION INTRAMUSCULAR; INTRAVENOUS at 01:04

## 2017-04-03 RX ADMIN — GABAPENTIN 300 MG: 300 CAPSULE ORAL at 08:04

## 2017-04-03 RX ADMIN — SODIUM CHLORIDE, SODIUM GLUCONATE, SODIUM ACETATE, POTASSIUM CHLORIDE, MAGNESIUM CHLORIDE, SODIUM PHOSPHATE, DIBASIC, AND POTASSIUM PHOSPHATE: .53; .5; .37; .037; .03; .012; .00082 INJECTION, SOLUTION INTRAVENOUS at 01:04

## 2017-04-03 RX ADMIN — PROPOFOL 150 MG: 10 INJECTION, EMULSION INTRAVENOUS at 11:04

## 2017-04-03 RX ADMIN — ROCURONIUM BROMIDE 5 MG: 10 INJECTION, SOLUTION INTRAVENOUS at 11:04

## 2017-04-03 RX ADMIN — ONDANSETRON 4 MG: 2 INJECTION INTRAMUSCULAR; INTRAVENOUS at 06:04

## 2017-04-03 RX ADMIN — MYCOPHENOLATE MOFETIL 500 MG: 250 CAPSULE ORAL at 08:04

## 2017-04-03 RX ADMIN — MIDAZOLAM HYDROCHLORIDE 4 MG: 1 INJECTION, SOLUTION INTRAMUSCULAR; INTRAVENOUS at 11:04

## 2017-04-03 RX ADMIN — MIRTAZAPINE 30 MG: 15 TABLET, FILM COATED ORAL at 08:04

## 2017-04-03 RX ADMIN — HYDROMORPHONE HYDROCHLORIDE 0.2 MG: 1 INJECTION, SOLUTION INTRAMUSCULAR; INTRAVENOUS; SUBCUTANEOUS at 04:04

## 2017-04-03 RX ADMIN — PROPOFOL 100 MCG/KG/MIN: 10 INJECTION, EMULSION INTRAVENOUS at 12:04

## 2017-04-03 RX ADMIN — PROMETHAZINE HYDROCHLORIDE 6.25 MG: 25 INJECTION, SOLUTION INTRAMUSCULAR; INTRAVENOUS at 07:04

## 2017-04-03 RX ADMIN — Medication 20 MG: at 12:04

## 2017-04-03 RX ADMIN — CLINDAMYCIN PHOSPHATE 900 MG: 18 INJECTION, SOLUTION INTRAVENOUS at 12:04

## 2017-04-03 RX ADMIN — BUTALBITAL, ACETAMINOPHEN AND CAFFEINE 1 TABLET: 50; 325; 40 TABLET ORAL at 05:04

## 2017-04-03 RX ADMIN — SODIUM CHLORIDE: 0.9 INJECTION, SOLUTION INTRAVENOUS at 11:04

## 2017-04-03 RX ADMIN — AZITHROMYCIN 500 MG: 250 TABLET, FILM COATED ORAL at 05:04

## 2017-04-03 RX ADMIN — DIPHENHYDRAMINE HYDROCHLORIDE 25 MG: 50 INJECTION, SOLUTION INTRAMUSCULAR; INTRAVENOUS at 03:04

## 2017-04-03 RX ADMIN — HYDROMORPHONE HYDROCHLORIDE 1.5 MG: 1 INJECTION, SOLUTION INTRAMUSCULAR; INTRAVENOUS; SUBCUTANEOUS at 08:04

## 2017-04-03 RX ADMIN — SUCCINYLCHOLINE CHLORIDE 100 MG: 20 INJECTION, SOLUTION INTRAMUSCULAR; INTRAVENOUS at 11:04

## 2017-04-03 RX ADMIN — POLYETHYLENE GLYCOL 3350 17 G: 17 POWDER, FOR SOLUTION ORAL at 08:04

## 2017-04-03 NOTE — ANESTHESIA RELEASE NOTE
Anesthesia Release from PACU Note    Patient: Juanita Ibarra    Procedure(s) Performed: Procedure(s) (LRB):  SINUS SURGERY FUNCTIONAL ENDOSCOPIC WITH NAVIGATION (Bilateral)    Anesthesia type: general    Post pain: Adequate analgesia    Post assessment: no apparent anesthetic complications, tolerated procedure well and no evidence of recall    Last Vitals:   Visit Vitals    BP (!) 148/92 (BP Location: Right arm, Patient Position: Lying, BP Method: Automatic)    Pulse 110    Temp 36.9 °C (98.4 °F) (Oral)    Resp 16    Ht 5' (1.524 m)    Wt 53.5 kg (118 lb)    SpO2 (!) 91%    Breastfeeding No    BMI 23.05 kg/m2       Post vital signs: stable    Level of consciousness: awake, alert  and oriented    Nausea/Vomiting: no nausea/no vomiting    Complications: none    Airway Patency: patent    Respiratory: unassisted, spontaneous ventilation, room air    Cardiovascular: stable and blood pressure at baseline    Hydration: euvolemic

## 2017-04-03 NOTE — ANESTHESIA POSTPROCEDURE EVALUATION
Anesthesia Post Evaluation    Patient: Juanita Ibarra    Procedure(s) Performed: Procedure(s) (LRB):  SINUS SURGERY FUNCTIONAL ENDOSCOPIC WITH NAVIGATION (Bilateral)    Final Anesthesia Type: general  Patient location during evaluation: PACU  Patient participation: Yes- Able to Participate  Level of consciousness: awake and alert  Post-procedure vital signs: reviewed and stable  Pain management: adequate  Airway patency: patent  PONV status at discharge: No PONV  Anesthetic complications: no      Cardiovascular status: hemodynamically stable  Respiratory status: unassisted, spontaneous ventilation and room air  Hydration status: euvolemic  Follow-up not needed.        Visit Vitals    BP (!) 148/92 (BP Location: Right arm, Patient Position: Lying, BP Method: Automatic)    Pulse 110    Temp 36.9 °C (98.4 °F) (Oral)    Resp 16    Ht 5' (1.524 m)    Wt 53.5 kg (118 lb)    SpO2 (!) 91%    Breastfeeding No    BMI 23.05 kg/m2       Pain/Richard Score: Pain Assessment Performed: Yes (4/3/2017  3:45 PM)  Presence of Pain: complains of pain/discomfort (4/3/2017  3:45 PM)  Pain Rating Prior to Med Admin: 8 (4/3/2017  3:58 PM)  Rcihard Score: 9 (4/3/2017  2:45 PM)

## 2017-04-03 NOTE — NURSING TRANSFER
Nursing Transfer Note      4/3/2017     Transfer To: [address]    Transfer via stretcher    Transfer with     Transported by pct    Medicines sent: n/a    Chart send with patient: Yes    Notified: aunt    Patient reassessed at: 04/03/17 1645    Upon arrival to floor:

## 2017-04-03 NOTE — TRANSFER OF CARE
Anesthesia Transfer of Care Note    Patient: Juanita Ibarra    Procedure(s) Performed: Procedure(s) (LRB):  SINUS SURGERY FUNCTIONAL ENDOSCOPIC WITH NAVIGATION (Bilateral)    Patient location: PACU    Anesthesia Type: epidural    Transport from OR: Transported from OR on 6-10 L/min O2 by face mask with adequate spontaneous ventilation    Post pain: adequate analgesia    Post assessment: no apparent anesthetic complications    Post vital signs: stable    Level of consciousness: awake, alert and oriented    Nausea/Vomiting: no nausea/vomiting    Complications: none          Last vitals:   Visit Vitals    BP (!) 154/114 (BP Location: Right arm, Patient Position: Lying, BP Method: Automatic)    Pulse 105    Temp 37 °C (98.6 °F) (Oral)    Resp 20    Ht 5' (1.524 m)    Wt 53.5 kg (118 lb)    SpO2 95%    Breastfeeding No    BMI 23.05 kg/m2

## 2017-04-03 NOTE — IP AVS SNAPSHOT
[facility]  [address]  [facility] [address]  Phone: [phone]           Patient Discharge Instructions   Our goal is to set you up for success. This packet includes information on your condition, medications, and your home care.  It will help you care for yourself to prevent having to return to the hospital.     Please ask your nurse if you have any questions.      There are many details to remember when preparing to leave the hospital. Here is what you will need to do:    1. Take your medicine. If you are prescribed medications, review your Medication List on the following pages. You may have new medications to  at the pharmacy and others that you'll need to stop taking. Review the instructions for how and when to take your medications. Talk with your doctor or nurses if you are unsure of what to do.     2. Go to your follow-up appointments. Specific follow-up information is listed in the following pages. Your may be contacted by a nurse or clinical provider about future appointments. Be sure we have all of the phone numbers to reach you. Please contact your provider's office if you are unable to make an appointment.     3. Watch for warning signs. Your doctor or nurse will give you detailed warning signs to watch for and when to call for assistance. These instructions may also include educational information about your condition. If you experience any of warning signs to your health, call your doctor.           [facility]  Unless otherwise directed by your provider, please   contact [facility], our nurse care line   that is available for 24/7 assistance.     [phone] (toll-free)     Registered nurses in the [facility]   provide: appointment scheduling, clinical advisement, health education, and other advisory services.                  ** Verify the list of medication(s) below is accurate and up to date. Carry this with you in case of  emergency. If your medications have changed, please notify your healthcare provider.             Medication List      START taking these medications        Additional Info                      oxycodone 5 mg Cap   Commonly known as:  OXY-IR   Quantity:  60 capsule   Refills:  0    Instructions:  1 capsule (5mg) for pain scale 1-3 2 capsule (10mg) for pain scale 4-6 3 capsule (15 mg) for pain scale 7-10 Every four hours as needed for post-operative pain.     Begin Date    AM    Noon    PM    Bedtime         CHANGE how you take these medications        Additional Info                      CREON 24,000-76,000 -120,000 unit capsule   Quantity:  810 capsule   Refills:  0   What changed:  See the new instructions.   Generic drug:  lipase-protease-amylase 24,000-76,000-120,000 units    Instructions:  TAKE 5 CAPSULES BY MOUTH WITH MEALS AND 4 CAPSULES WITH SNACKS EVERYDAY     Begin Date    AM    Noon    PM    Bedtime       * predniSONE 5 MG tablet   Commonly known as:  DELTASONE   Refills:  0   Dose:  5 mg   What changed:  Another medication with the same name was added. Make sure you understand how and when to take each.    Last time this was given:  5 mg on 4/5/2017  7:39 AM   Instructions:  Take 5 mg by mouth once daily.     Begin Date    AM    Noon    PM    Bedtime       * predniSONE 10 MG tablet   Commonly known as:  DELTASONE   Quantity:  32 tablet   Refills:  0   What changed:  You were already taking a medication with the same name, and this prescription was added. Make sure you understand how and when to take each.    Last time this was given:  5 mg on 4/5/2017  7:39 AM   Instructions:  Take 4 tablets once daily for 5 days, then: Take 3 tablets once daily for 2 days Take 2 tablets once daily for 2 days Take 1 tablet once daily for 2 days     Begin Date    AM    Noon    PM    Bedtime       * Notice:  This list has 2 medication(s) that are the same as other medications prescribed for you. Read the directions  carefully, and ask your doctor or other care provider to review them with you.      CONTINUE taking these medications        Additional Info                      aluminum-magnesium hydroxide-simethicone 200-200-20 mg/5 mL Susp   Commonly known as:  MAALOX ADVANCED   Quantity:  148 mL   Refills:  2   Dose:  30 mL    Instructions:  Take 30 mLs by mouth before meals and at bedtime as needed.     Begin Date    AM    Noon    PM    Bedtime       aripiprazole 2 MG Tab   Commonly known as:  ABILIFY   Refills:  0   Dose:  2 mg    Last time this was given:  2 mg on 4/5/2017  7:39 AM   Instructions:  Take 2 mg by mouth once daily.     Begin Date    AM    Noon    PM    Bedtime       azithromycin 500 MG tablet   Commonly known as:  ZITHROMAX   Quantity:  15 tablet   Refills:  11   Dose:  500 mg    Last time this was given:  500 mg on 4/3/2017  5:01 PM   Instructions:  Take 1 tablet (500 mg total) by mouth every Mon, Wed, Fri.     Begin Date    AM    Noon    PM    Bedtime       butalbital-acetaminophen-caffeine -40 mg -40 mg per tablet   Commonly known as:  FIORICET, ESGIC   Quantity:  120 tablet   Refills:  0    Last time this was given:  1 tablet on 4/3/2017  5:01 PM   Instructions:  TAKE 1 TABLET BY MOUTH EVERY 6 HOURS AS NEEDED FOR HEADACHE     Begin Date    AM    Noon    PM    Bedtime       calcium-vitamin D3 500 mg(1,250mg) -200 unit per tablet   Quantity:  60 tablet   Refills:  11   Dose:  1 tablet    Instructions:  Take 1 tablet by mouth 2 (two) times daily with meals.     Begin Date    AM    Noon    PM    Bedtime       dapsone 100 MG Tab   Quantity:  30 tablet   Refills:  11    Last time this was given:  100 mg on 4/5/2017  7:39 AM   Instructions:  TAKE 1 TABLET BY MOUTH EVERY DAY     Begin Date    AM    Noon    PM    Bedtime       diphenhydrAMINE 50 MG tablet   Commonly known as:  BENADRYL   Quantity:  1 tablet   Refills:  0   Dose:  50 mg    Instructions:  Take 1 tablet (50 mg total) by mouth every 6 (six)  hours as needed for Itching.     Begin Date    AM    Noon    PM    Bedtime       fluticasone 50 mcg/actuation nasal spray   Commonly known as:  FLONASE   Quantity:  16 g   Refills:  5    Last time this was given:  2 sprays on 4/5/2017  7:39 AM   Instructions:  INSERT 2 SPRAY IN EACH NOSTRIL DAILY     Begin Date    AM    Noon    PM    Bedtime       folic acid 1 MG tablet   Commonly known as:  FOLVITE   Quantity:  30 tablet   Refills:  11    Instructions:  TAKE 1 TABLET BY MOUTH EVERY DAY     Begin Date    AM    Noon    PM    Bedtime       gabapentin 300 MG capsule   Commonly known as:  NEURONTIN   Quantity:  90 capsule   Refills:  11   Dose:  300 mg    Last time this was given:  300 mg on 4/5/2017  5:44 AM   Instructions:  Take 1 capsule (300 mg total) by mouth 3 (three) times daily.     Begin Date    AM    Noon    PM    Bedtime       insulin glargine (TOUJEO) 300 unit/mL (1.5 mL) Inpn pen   Commonly known as:  TOUJEO   Refills:  0   Dose:  5 Units    Last time this was given:  5 Units on 4/5/2017  7:40 AM   Instructions:  Inject 5 Units into the skin once daily.     Begin Date    AM    Noon    PM    Bedtime       levalbuterol 45 mcg/actuation inhaler   Commonly known as:  XOPENEX HFA   Quantity:  15 g   Refills:  0    Instructions:  INHALE 1 TO 2 PUFFS INTO THE LUNGS EVERY 6 HOURS AS NEEDED FOR WHEEZING OR SHORTNESS OF BREATH. USE WITH SPACER.     Begin Date    AM    Noon    PM    Bedtime       lorazepam 2 MG Tab   Commonly known as:  ATIVAN   Quantity:  60 tablet   Refills:  3   Dose:  2 mg    Instructions:  Take 1 tablet (2 mg total) by mouth every 12 (twelve) hours as needed (for anxiety).     Begin Date    AM    Noon    PM    Bedtime       magnesium oxide 400 mg tablet   Commonly known as:  MAG-OX   Quantity:  60 tablet   Refills:  11    Instructions:  Take 1 tablet (400 mg total) by mouth 2 (two) times daily.     Begin Date    AM    Noon    PM    Bedtime       mirtazapine 15 MG tablet   Commonly known as:   REMERON   Refills:  0   Dose:  30 mg    Last time this was given:  30 mg on 4/4/2017  8:44 PM   Instructions:  Take 30 mg by mouth every evening. rx'd by PCP     Begin Date    AM    Noon    PM    Bedtime       mometasone-formoterol 100-5 mcg/actuation Hfaa   Refills:  0   Dose:  1 puff    Instructions:  Inhale 1 puff into the lungs 2 (two) times daily.     Begin Date    AM    Noon    PM    Bedtime       montelukast 10 mg tablet   Commonly known as:  SINGULAIR   Quantity:  30 tablet   Refills:  11    Instructions:  TAKE 1 TABLET BY MOUTH EVERY DAY     Begin Date    AM    Noon    PM    Bedtime       morphine 15 MG 12 hr tablet   Commonly known as:  MS CONTIN   Refills:  0   Dose:  15 mg    Instructions:  Take 15 mg by mouth 2 (two) times daily.     Begin Date    AM    Noon    PM    Bedtime       mvAditi min cmb#[id number] 100-700-10 mcg-mcg-mg Cap cap   Commonly known as:  AQUADEKS   Quantity:  60 capsule   Refills:  11   Dose:  1 capsule    Instructions:  Take 1 capsule by mouth 2 (two) times daily.     Begin Date    AM    Noon    PM    Bedtime       mycophenolate 250 mg Cap   Commonly known as:  CELLCEPT   Quantity:  120 capsule   Refills:  11   Dose:  500 mg    Last time this was given:  500 mg on 4/5/2017  7:38 AM   Instructions:  Take 2 capsules (500 mg total) by mouth 2 (two) times daily.     Begin Date    AM    Noon    PM    Bedtime       omeprazole 40 MG capsule   Commonly known as:  PRILOSEC   Quantity:  30 capsule   Refills:  11    Last time this was given:  40 mg on 4/5/2017  8:00 AM   Instructions:  TAKE 1 CAPSULE BY MOUTH EVERY MORNING     Begin Date    AM    Noon    PM    Bedtime       ondansetron 8 MG tablet   Commonly known as:  ZOFRAN   Quantity:  30 tablet   Refills:  1   Dose:  8 mg    Instructions:  Take 1 tablet (8 mg total) by mouth every 8 (eight) hours as needed.     Begin Date    AM    Noon    PM    Bedtime       polyethylene glycol 17 gram Pwpk   Commonly known as:  GLYCOLAX   Quantity:  60  packet   Refills:  11   Dose:  17 g    Last time this was given:  17 g on 4/3/2017  8:58 PM   Instructions:  Take 17 g by mouth 2 (two) times daily.     Begin Date    AM    Noon    PM    Bedtime       PROCHAMBER   Quantity:  1 Device   Refills:  0   Generic drug:  inhalation spacing device    Instructions:  USE AS DIRECTED     Begin Date    AM    Noon    PM    Bedtime       promethazine 25 MG tablet   Commonly known as:  PHENERGAN   Quantity:  45 tablet   Refills:  0    Instructions:  TAKE 1 TABLET BY MOUTH EVERY 8 HOURS AS NEEDED FOR NAUSEA     Begin Date    AM    Noon    PM    Bedtime       * tacrolimus 1 MG Cap   Commonly known as:  PROGRAF   Quantity:  180 capsule   Refills:  11   Dose:  3 mg    Last time this was given:  3.5 mg on 4/5/2017  7:37 AM   Instructions:  Take 3 capsules (3 mg total) by mouth every 12 (twelve) hours. Daily doses: 3.5 mg every 12 hours.     Begin Date    AM    Noon    PM    Bedtime       * tacrolimus 0.5 MG Cap   Commonly known as:  PROGRAF   Quantity:  60 capsule   Refills:  11   Dose:  0.5 mg    Last time this was given:  3.5 mg on 4/5/2017  7:37 AM   Instructions:  Take 1 capsule (0.5 mg total) by mouth every 12 (twelve) hours.     Begin Date    AM    Noon    PM    Bedtime       tizanidine 4 MG tablet   Commonly known as:  ZANAFLEX   Quantity:  90 tablet   Refills:  0    Instructions:  TAKE 2 TABLETS BY MOUTH EVERY 6 HOURS AS NEEDED     Begin Date    AM    Noon    PM    Bedtime       * Notice:  This list has 2 medication(s) that are the same as other medications prescribed for you. Read the directions carefully, and ask your doctor or other care provider to review them with you.         Where to Get Your Medications      You can get these medications from any pharmacy     Bring a paper prescription for each of these medications     oxycodone 5 mg Cap    predniSONE 10 MG tablet                  Please bring to all follow up appointments:    1. A copy of your discharge  instructions.  2. All medicines you are currently taking in their original bottles.  3. Identification and insurance card.    Please arrive 15 minutes ahead of scheduled appointment time.    Please call 24 hours in advance if you must reschedule your appointment and/or time.        Your Scheduled Appointments     Apr 10, 2017 11:00 AM CDT   Pre-Admit Testing Visit with PRE-ADMIT, [facility] ([facility])    [address] [id number] [phone]            Apr 10, 2017  1:45 PM CDT   Pre OP with Deny Dias Jr., MD   [facility] 5th Floor ([facility] )    [address]  [place] [address] [phone]              Your Future Surgeries/Procedures     Apr 11, 2017   Surgery with Deny Dias Jr., MD   [facility] ([facility])    [address] [address] [phone]              Follow-up Information     Follow up with Cesar Olsen MD. Schedule an appointment as soon as possible for a visit in 1 week.    Specialty:  Otolaryngology    Why:  For post-op visit    Contact information:    [address] [id number] [phone]          Discharge Instructions     Future Orders    Activity as tolerated     Call MD for:  difficulty breathing or increased cough     Call MD for:  persistent dizziness, light-headedness, or visual disturbances     Call MD for:  persistent nausea and vomiting or diarrhea     Call MD for:  severe persistent headache     Call MD for:  temperature >100.4     Diet general     Questions:    Total calories:      Fat restriction, if any:      Protein restriction, if any:      Na restriction, if any:      Fluid restriction:      Additional restrictions:      No dressing needed         Discharge Instructions       INSTRUCTIONS TO FOLLOW AFTER SINUS SURGERY  DR. McCOUL - [facility]      Your first office visit with   Raúl after surgery should have been already scheduled.  If you dont know when it is, call Dr. Nunn nurse Rosalva at [phone].    ACTIVITY:    Sleep on your back with the head of the bead elevated, up on 2-3 pillows, or in a recliner for the first 3 to 5 days. This will help with swelling.     After surgery you may have a lot of nasal drainage. This is normal. Do not wipe, touch, or dab your nose. You may breathe through your nose if youre able but avoid inhaling forcibly. Let all drainage fall on your mustache dressing and change it as needed.    You may shower 24 hours after surgery.    If you use CPAP or BiPAP to sleep at night, you should wait at least 48 hours before resuming use.  Dr. Olsen will advise you when it is safe to do this.    DRESSINGS:    Change the mustache dressing under your nose as needed. (If unsure what this dressing is or how to do this, ask your doctor or nurse before you leave the clinic/hospital.) You may have pinkish-red drainage for 2-3 days.    In certain cases you may have gauze packing inside your nostrils.  If so, these will turn from white to red from the drainage. This is normal so do not be alarmed. Do not touch or pull at the packing. The packing will be removed by your doctor at your first post-op visit.     You may also have a dissolvable stent or dissolvable sponge placed into the sinuses during surgery.  These usually do not need to be removed unless you are told otherwise by Dr. Olsen.  You may notice small fragments of these items come out of your nose in the weeks following surgery.    MEDICATIONS:  After surgery, you are generally sent home with prescription for an antibiotic, a pain medication, and an anti-nausea medication.     Start your antibiotics when you get home from surgery and take them until they are all gone.  It is best to take them with food to prevent an upset stomach.    Most people need prescription pain medication for the first few days  after surgery.  If you find that this is not necessary, you may use over-the-counter Tylenol (Acetaminophen) instead.    Avoid Aspirin, Motrin (Ibuprofen), Advil, Aleve and Vitamin E for 1 week before surgery and 1 week after surgery. There are many other medications to avoid as well due to the fact they act as blood thinners.      Some people have problems with bowel movements after surgery. If you have NOT had a bowel movement 3-5 days after surgery, go to your local pharmacy and purchase an over the counter stool softener such as COLACE. You can also ask the pharmacist for his or her recommendation. If you still do not have a bowel movement after starting the softener, please call Dr. Nunn office.    You will need these over-the-counter medications after surgery:    Saline Sinus Rinse (Tu Med brand):  You will use this to rinse out your nose and sinuses after surgery.  Begin doing this the day after surgery, unless instructed otherwise by Dr. Olsen.  You should do this 2 times a day, following the instructions on the box.    Afrin (regular strength): Only use if you have nasal congestion or bleeding. Use 2 times per day for 3 days, stop for 1 day, continue 2 times per day for 3 days, then stop completely.  NOTE:  You may not need to do this at all.      RESTRICTED ACTIVITIES AFTER SURGERY:    Do NOT blow your nose for 2 weeks. If you have to sneeze or cough, do so with your mouth open.   AVOID all heavy lifting, straining or bending for 2 weeks.   AVOID any sexual activity for 2 weeks after surgery.  AVOID semi-contact sports or vigorous exercising for 3-4 weeks. Dr. Olsen will let you know when you are cleared to resume exercise.  No Swimming for 3-4 weeks.  No Flying for 2 weeks.    Do NOT operate a motor vehicle or any type of heavy machinery within 24 hours of taking pain medication.  DO NOT smoke or be around smokers.  AVOID irritating substances such as dust, chalk, harsh chemicals, and allergic  triggers.    DIET:    Avoid hot and spicy foods, or salty soups for 1 week after surgery.  Begin with bland foods the day after surgery and advance to your regular diet as tolerated.  It is not necessary to take only soft food unless you are recovering from tonsil surgery.  Drink plenty of fluids (water is best).   Avoid alcoholic and caffeinated beverages for 1 week after surgery.        Primary Diagnosis     Your primary diagnosis was:  Chronic Ethmoidal Sinusitis      Admission Information     Date & Time Provider Department CSN    4/3/2017  9:56 AM Cesar Olsen MD [facility] [id number]      Care Providers     Provider Role Specialty Primary office phone    Cesar Olsen MD Attending Provider Otolaryngology [phone]    Cesar Olsen MD Surgeon  Otolaryngology [phone]      Your Vitals Were     BP Pulse Temp Resp Height Weight    150/98 (BP Location: Left arm, Patient Position: Lying, BP Method: Manual) 88 98.6 °F (37 °C) (Oral) 16 5' (1.524 m) 53.5 kg (118 lb)    SpO2 BMI             95% 23.05 kg/m2         Recent Lab Values        6/29/2014 8/11/2014 5/19/2016                     4:30 AM  3:09 PM  8:47 AM         A1C 4.0 (L) 4.6 4.2 (L)                   Pending Labs     Order Current Status    Fungus culture In process    Fungus culture In process    Specimen to Pathology - Surgery In process    AFB Culture & Smear Preliminary result    AFB Culture & Smear Preliminary result    Aerobic culture Preliminary result    Aerobic culture Preliminary result    Culture, Anaerobe Preliminary result    Culture, Anaerobe Preliminary result      Allergies as of 4/5/2017        Reactions    Colistin Anaphylaxis    Vancomycin Analogues     Infusion reaction that does not resolve with slowing    Neupogen [Filgrastim] Other (See Comments)    Ostealgia after five daily doses of 300 mcg.      Bactrim [Sulfamethoxazole-trimethoprim] Hives    Ceftazidime (Anhydrous) Hives    Pt stated can  tolerate cefapine not ceftazidime    Dronabinol Other (See Comments)    Mental changes/hallucinations    Haldol [Haloperidol Lactate] Other (See Comments)    Seizure like activity    Nsaids (Non-steroidal Anti-inflammatory Drug)     Cannot have due to lung transplant    Adhesive Rash    Cloth tape- please use tegaderm or paper tape    Aztreonam Rash    Ciprofloxacin Nausea And Vomiting    Projectile N/V, per patient.  Unwilling to retry therapy.      Advance Directives     An advance directive is a document which, in the event you are no longer able to make decisions for yourself, tells your healthcare team what kind of treatment you do or do not want to receive, or who you would like to make those decisions for you.  If you do not currently have an advance directive, [facility] encourages you to create one.  For more information call:  [phone]), [phone] ([phone]),  or log on to [email or web address].        Language Assistance Services     ATTENTION: Language assistance services are available, free of charge. Please call [phone].      ATENCIÓN: Si habla español, tiene a odom disposición servicios gratuitos de asistencia lingüística. Llame al [phone].     [facility] Ý: N?u b?n nói Ti?ng Vi?t, có các d?ch v? h? tr? ngôn ng? mi?n phí dành cho b?n. G?i s? [phone].        Pneumonmia Discharge Instructions                Diabetes Discharge Instructions                                    [facility] complies with applicable Federal civil rights laws and does not discriminate on the basis of race, color, national origin, age, disability, or sex.

## 2017-04-03 NOTE — BRIEF OP NOTE
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 Item Serial No.  Lot No. LRB No. Used   IMPLANT PROPEL MOMETASONE - [id number]  IMPLANT PROPEL MOMETASONE  [employer] [id number] Left 1   IMPLANT PROPEL MOMETASONE - [id number]   IMPLANT PROPEL MOMETASONE   [employer] [id number] Right 1       Specimens:   Specimen (12h ago through future)    Start     Ordered    04/03/17 1420  Specimen to Pathology - Surgery  Once     Comments:  1.) Left Ethmoid- permanent.  2.) Left Frontal - permanent .  3.) Right Maxillary sinus contents - permanent.    04/03/17 1420                  Condition: Good    Disposition: PACU - hemodynamically stable.    Attestation: I was present and scrubbed for the entire procedure.

## 2017-04-03 NOTE — PROGRESS NOTES
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 Item Serial No.  Lot No. LRB No. Used   IMPLANT PROPEL MOMETASONE - [id number]  IMPLANT PROPEL MOMETASONE  [employer] [id number] Left 1   IMPLANT PROPEL MOMETASONE - [id number]   IMPLANT PROPEL MOMETASONE   [employer] [id number] Right 1       Specimens:   Specimen (12h ago through future)    Start     Ordered    04/03/17 1420  Specimen to Pathology - Surgery  Once     Comments:  1.) Left Ethmoid- permanent.  2.) Left Frontal - permanent .  3.) Right Maxillary sinus contents - permanent.    04/03/17 1420                  Condition: Good    Disposition: PACU - hemodynamically stable.    Attestation: I was present and scrubbed for the entire procedure.

## 2017-04-03 NOTE — INTERVAL H&P NOTE
The patient has been examined and the H&P has been reviewed:2    I concur with the findings and no changes have occurred since H&P was written.    Anesthesia/Surgery risks, benefits and alternative options discussed and understood by patient/family.          Active Hospital Problems    Diagnosis  POA    Chronic ethmoidal sinusitis [J32.2]  Yes      Resolved Hospital Problems    Diagnosis Date Resolved POA   No resolved problems to display.

## 2017-04-04 LAB
ANION GAP SERPL CALC-SCNC: 7 MMOL/L
BASOPHILS # BLD AUTO: 0.01 K/UL
BASOPHILS NFR BLD: 0.3 %
BUN SERPL-MCNC: 16 MG/DL
CALCIUM SERPL-MCNC: 7.7 MG/DL
CHLORIDE SERPL-SCNC: 114 MMOL/L
CO2 SERPL-SCNC: 18 MMOL/L
CREAT SERPL-MCNC: 1 MG/DL
DIFFERENTIAL METHOD: ABNORMAL
EOSINOPHIL # BLD AUTO: 0 K/UL
EOSINOPHIL NFR BLD: 0 %
ERYTHROCYTE [DISTWIDTH] IN BLOOD BY AUTOMATED COUNT: 15.5 %
EST. GFR  (AFRICAN AMERICAN): >60 ML/MIN/1.73 M^2
EST. GFR  (NON AFRICAN AMERICAN): >60 ML/MIN/1.73 M^2
GLUCOSE SERPL-MCNC: 183 MG/DL
HCT VFR BLD AUTO: 33.2 %
HGB BLD-MCNC: 10.2 G/DL
LYMPHOCYTES # BLD AUTO: 0.9 K/UL
LYMPHOCYTES NFR BLD: 28.4 %
MAGNESIUM SERPL-MCNC: 1.4 MG/DL
MCH RBC QN AUTO: 28.7 PG
MCHC RBC AUTO-ENTMCNC: 30.7 %
MCV RBC AUTO: 93 FL
MONOCYTES # BLD AUTO: 0.1 K/UL
MONOCYTES NFR BLD: 2.1 %
NEUTROPHILS # BLD AUTO: 2.3 K/UL
NEUTROPHILS NFR BLD: 69.2 %
PLATELET # BLD AUTO: 126 K/UL
PMV BLD AUTO: 10.9 FL
POTASSIUM SERPL-SCNC: 4.3 MMOL/L
RBC # BLD AUTO: 3.56 M/UL
SODIUM SERPL-SCNC: 139 MMOL/L
TACROLIMUS BLD-MCNC: 5.7 NG/ML
WBC # BLD AUTO: 3.28 K/UL

## 2017-04-04 PROCEDURE — 63600175 PHARM REV CODE 636 W HCPCS: Performed by: OTOLARYNGOLOGY

## 2017-04-04 PROCEDURE — 80048 BASIC METABOLIC PNL TOTAL CA: CPT

## 2017-04-04 PROCEDURE — 11000001 HC ACUTE MED/SURG PRIVATE ROOM

## 2017-04-04 PROCEDURE — 85025 COMPLETE CBC W/AUTO DIFF WBC: CPT

## 2017-04-04 PROCEDURE — 25000003 PHARM REV CODE 250: Performed by: NURSE PRACTITIONER

## 2017-04-04 PROCEDURE — 80197 ASSAY OF TACROLIMUS: CPT

## 2017-04-04 PROCEDURE — 83735 ASSAY OF MAGNESIUM: CPT

## 2017-04-04 PROCEDURE — 63600175 PHARM REV CODE 636 W HCPCS: Performed by: NURSE PRACTITIONER

## 2017-04-04 RX ORDER — OMEPRAZOLE 40 MG/1
40 CAPSULE, DELAYED RELEASE ORAL EVERY MORNING
Status: DISCONTINUED | OUTPATIENT
Start: 2017-04-05 | End: 2017-04-05 | Stop reason: HOSPADM

## 2017-04-04 RX ORDER — OXYCODONE HYDROCHLORIDE 5 MG/1
CAPSULE ORAL
Qty: 60 CAPSULE | Refills: 0
Start: 2017-04-04 | End: 2017-04-05

## 2017-04-04 RX ORDER — PREDNISONE 10 MG/1
TABLET ORAL
Qty: 32 TABLET | Refills: 0
Start: 2017-04-04 | End: 2017-04-05

## 2017-04-04 RX ORDER — OXYCODONE AND ACETAMINOPHEN 10; 325 MG/1; MG/1
1 TABLET ORAL EVERY 4 HOURS PRN
Status: DISCONTINUED | OUTPATIENT
Start: 2017-04-04 | End: 2017-04-05 | Stop reason: HOSPADM

## 2017-04-04 RX ORDER — DIPHENHYDRAMINE HYDROCHLORIDE 50 MG/ML
50 INJECTION INTRAMUSCULAR; INTRAVENOUS EVERY 4 HOURS PRN
Status: DISCONTINUED | OUTPATIENT
Start: 2017-04-04 | End: 2017-04-05 | Stop reason: HOSPADM

## 2017-04-04 RX ADMIN — ARIPIPRAZOLE 2 MG: 2 TABLET ORAL at 09:04

## 2017-04-04 RX ADMIN — TACROLIMUS 3.5 MG: 1 CAPSULE, GELATIN COATED ORAL at 05:04

## 2017-04-04 RX ADMIN — DIPHENHYDRAMINE HYDROCHLORIDE 50 MG: 50 INJECTION, SOLUTION INTRAMUSCULAR; INTRAVENOUS at 04:04

## 2017-04-04 RX ADMIN — HYDROMORPHONE HYDROCHLORIDE 1.5 MG: 1 INJECTION, SOLUTION INTRAMUSCULAR; INTRAVENOUS; SUBCUTANEOUS at 05:04

## 2017-04-04 RX ADMIN — PREDNISONE 5 MG: 5 TABLET ORAL at 07:04

## 2017-04-04 RX ADMIN — HYDROMORPHONE HYDROCHLORIDE 1.5 MG: 1 INJECTION, SOLUTION INTRAMUSCULAR; INTRAVENOUS; SUBCUTANEOUS at 12:04

## 2017-04-04 RX ADMIN — GABAPENTIN 300 MG: 300 CAPSULE ORAL at 01:04

## 2017-04-04 RX ADMIN — HYDROMORPHONE HYDROCHLORIDE 1.5 MG: 1 INJECTION, SOLUTION INTRAMUSCULAR; INTRAVENOUS; SUBCUTANEOUS at 02:04

## 2017-04-04 RX ADMIN — MIRTAZAPINE 30 MG: 15 TABLET, FILM COATED ORAL at 08:04

## 2017-04-04 RX ADMIN — MAGNESIUM SULFATE IN WATER 2 G: 40 INJECTION, SOLUTION INTRAVENOUS at 01:04

## 2017-04-04 RX ADMIN — HYDROMORPHONE HYDROCHLORIDE 1.5 MG: 1 INJECTION, SOLUTION INTRAMUSCULAR; INTRAVENOUS; SUBCUTANEOUS at 08:04

## 2017-04-04 RX ADMIN — MAGNESIUM SULFATE IN WATER 2 G: 40 INJECTION, SOLUTION INTRAVENOUS at 11:04

## 2017-04-04 RX ADMIN — FLUTICASONE PROPIONATE 2 SPRAY: 50 SPRAY, METERED NASAL at 07:04

## 2017-04-04 RX ADMIN — GABAPENTIN 300 MG: 300 CAPSULE ORAL at 06:04

## 2017-04-04 RX ADMIN — DIPHENHYDRAMINE HYDROCHLORIDE 25 MG: 50 INJECTION, SOLUTION INTRAMUSCULAR; INTRAVENOUS at 03:04

## 2017-04-04 RX ADMIN — DEXAMETHASONE SODIUM PHOSPHATE 8 MG: 4 INJECTION, SOLUTION INTRAMUSCULAR; INTRAVENOUS at 08:04

## 2017-04-04 RX ADMIN — TACROLIMUS 3.5 MG: 1 CAPSULE, GELATIN COATED ORAL at 07:04

## 2017-04-04 RX ADMIN — GABAPENTIN 300 MG: 300 CAPSULE ORAL at 08:04

## 2017-04-04 RX ADMIN — DIPHENHYDRAMINE HYDROCHLORIDE 25 MG: 50 INJECTION, SOLUTION INTRAMUSCULAR; INTRAVENOUS at 11:04

## 2017-04-04 RX ADMIN — MYCOPHENOLATE MOFETIL 500 MG: 250 CAPSULE ORAL at 08:04

## 2017-04-04 RX ADMIN — DIPHENHYDRAMINE HYDROCHLORIDE 50 MG: 50 INJECTION, SOLUTION INTRAMUSCULAR; INTRAVENOUS at 08:04

## 2017-04-04 RX ADMIN — DEXAMETHASONE SODIUM PHOSPHATE 8 MG: 4 INJECTION, SOLUTION INTRAMUSCULAR; INTRAVENOUS at 06:04

## 2017-04-04 RX ADMIN — HYDROMORPHONE HYDROCHLORIDE 1.5 MG: 1 INJECTION, SOLUTION INTRAMUSCULAR; INTRAVENOUS; SUBCUTANEOUS at 06:04

## 2017-04-04 RX ADMIN — HYDROMORPHONE HYDROCHLORIDE 1.5 MG: 1 INJECTION, SOLUTION INTRAMUSCULAR; INTRAVENOUS; SUBCUTANEOUS at 11:04

## 2017-04-04 RX ADMIN — HYDROMORPHONE HYDROCHLORIDE 1.5 MG: 1 INJECTION, SOLUTION INTRAMUSCULAR; INTRAVENOUS; SUBCUTANEOUS at 03:04

## 2017-04-04 RX ADMIN — DIPHENHYDRAMINE HYDROCHLORIDE 25 MG: 50 INJECTION, SOLUTION INTRAMUSCULAR; INTRAVENOUS at 07:04

## 2017-04-04 RX ADMIN — INSULIN GLARGINE 5 UNITS: 300 INJECTION, SOLUTION SUBCUTANEOUS at 07:04

## 2017-04-04 RX ADMIN — MYCOPHENOLATE MOFETIL 500 MG: 250 CAPSULE ORAL at 07:04

## 2017-04-04 RX ADMIN — PROMETHAZINE HYDROCHLORIDE 25 MG: 25 INJECTION, SOLUTION INTRAMUSCULAR; INTRAVENOUS at 05:04

## 2017-04-04 RX ADMIN — DAPSONE 100 MG: 100 TABLET ORAL at 07:04

## 2017-04-04 RX ADMIN — PROMETHAZINE HYDROCHLORIDE 25 MG: 25 INJECTION, SOLUTION INTRAMUSCULAR; INTRAVENOUS at 08:04

## 2017-04-04 RX ADMIN — HYDROMORPHONE HYDROCHLORIDE 1.5 MG: 1 INJECTION, SOLUTION INTRAMUSCULAR; INTRAVENOUS; SUBCUTANEOUS at 09:04

## 2017-04-04 RX ADMIN — DEXAMETHASONE SODIUM PHOSPHATE 8 MG: 4 INJECTION, SOLUTION INTRAMUSCULAR; INTRAVENOUS at 01:04

## 2017-04-04 NOTE — PROGRESS NOTES
Otolaryngology - Head and Neck Surgery  Progress Note    Subjective: NAEON, pain controlled, minimal nasal drainage. No vision changes, or metallic taste     Objective:  Temp:  [98.1 °F (36.7 °C)-98.6 °F (37 °C)]   Pulse:  []   Resp:  [9-21]   BP: (133-172)/()   SpO2:  [90 %-100 %]        Closed/Suction Drain 08/02/16 1425 Left Neck Bulb 10 Fr. (Active)   Site Description Healing 8/3/2016 12:00 PM   Dressing Type No dressing 8/3/2016 12:00 PM   Dressing Status Other (Comment) 8/3/2016 12:00 PM   Drainage Bloody 8/3/2016 12:00 PM   Status To bulb suction 8/3/2016 12:00 PM   Output (mL) 5 mL 8/3/2016  4:55 PM   ]      NAD, A&O x3   PERRL EMOI   No epistaxis   OP/OC clear     CBC    Recent Labs  Lab 04/04/17  0600   WBC 3.28*   HGB 10.2*   HCT 33.2*   MCV 93   *     BMP  No results for input(s): GLU, NA, K, CL, CO2, BUN, CREATININE, CALCIUM, MG in the last 24 hours.    Assessment: 26 yo F with CF, hx of lung transplant, POD# 1 bilateral ESS     Plan:   Appreciate pulmonary team assistance  Change mustache dressing prn   Will attempt to transition to po pain medications

## 2017-04-04 NOTE — OP NOTE
DATE OF OPERATION: 4/3/2017    SURGEON:  Cesar Olsen MD     ASSISTANT SURGEON:  Gallito Rachel MD     OPERATION:     1. Bilateral image-guided revision endoscopic total ethmoidectomy.  2. Bilateral image-guided revision endoscopic maxillary antrostomy.  3. Bilateral image-guided revision endoscopic sphenoidotomy.  4. Bilateral image-guided endoscopic frontal dissection with Draf IIA sinusotomy.     PREOPERATIVE DIAGNOSIS:      1. Chronic rhinosinusitis.  2. Cystic fibrosis.     POSTOPERATIVE DIAGNOSIS:      1. Chronic rhinosinusitis.  2. Cystic fibrosis.     ANESTHESIA: General.     COMPLICATIONS: None.     ESTIMATED BLOOD LOSS: 50 mL     SPECIMEN: Left ethmoid.  Left maxillary sinus contents.  Left maxillary sinus culture.  Right frontal sinus culture.     WOUND EXPECTANCY: Clean-contaminated.    DRESSING: Propel in bilateral middle meatus.  No nasal packing.     FINDINGS: Purulence in bilateral frontal sinuses, more copious on right.  Several obstructed ethmoid cells bilaterally.  Polypoid edema and inspissated purulence in both maxillary sinuses.  Hypoplastic bilateral sphenoid sinuses.     INDICATIONS: Chronic rhinosinusitis secondary to cystic fibrosis, not controlled with maximal medical therapy.     I discussed the risks, benefits and alternatives of surgical correction of the chronically obstructed sinuses and associated turbinate hypertrophy with the patient as well as the expected postoperative course. I gave her the opportunity to ask questions and I answered all of them. On the morning of surgery I again met with the patient and reviewed the indications for surgery and she consented to proceed.     DESCRIPTION OF PROCEDURE: The patient was brought to the operating room and placed supine on the operating table. The patient was placed under general anesthesia and intubated. The patient was positioned with a donut under the head and the image-guidance headset for the [employer] Fusion system was applied.   Image-guided navigation was indicated to facilitate exenteration of all ethmoid cells and the extent of sinusotomy.  The CT scan disc was loaded into the image-guidance system and registered with the patient tracking system according to the 's instructions. The pointer was calibrated and registration was verified using predefined landmarks.  Cottonoid pledgets soaked with 4% cocaine was placed into the nasal cavity bilaterally for mucosal decongestion. Prophylactic cefazolin was given prior to the surgery start. A time-out was performed to confirm the proper patient, site and procedure. The CT images were again reviewed prior to surgical start.  The patient was prepped and draped in the usual fashion.  The bed was placed in 20-degree reverse Trendelenberg position.     A 0-degree endoscope was used to examine the nasal cavity.  Local injections of 1% lidocaine with 1:100,000 parts epinephrine were then performed around the middle turbinates bilaterally as well as the inferior turbinate and the sphenopalatine ganglion region bilaterally.    Attention was then turned to the sinuses. The left middle meatus was topically decongested using pledgets containing 10,000 units of thrombin with 1:10,000 parts epinephrine. The middle turbinate appeared intact.     The uncinate process had been previously resected.  Several bridges of adhesion and new bone formation were present along the skull base and orbit anteriorly, which were cleared with cutting forceps.  Overlying polypoid edema and limited polyposis was also present and resected with a tissue shaver.  Inspissated mucoid exudate in the posterior ethmoid labyrinth was encountered in a cell along the skull base and this was opened as well, to complete a total ethmoidectomy.  Topical hemostatic agents on pledgets were then placed into the ethmoid cavity for hemostasis.     The previous maxillary sinus antrostomy was not patent. This was entered using a curved  suction to confirm the dimension of the lumen. The antrostomy was enlarged using cutting instruments, taking care not to move anterior to the maxillary line so as to avoid injury to the nasolacrimal duct.  Mucopurulent exudate  was present within the maxillary sinus.  This was sampled for bacterial and fungal cultures.    The sphenoid sinus rostrum was then identified.  Prior sphenoidotomy was apparent, but was very stenotic.  The sinus was entered in a low and medial fashion, adjacent to the superior turbinate. This sphenoidotomy was enlarged to include the posterior segment of this superior turbinate and the natural ostium of the sphenoid sinus.  Polypoid tissue  was present within the sphenoid sinus.        Dissection was performed at the site of the nasofrontal recess. Using a 70-degree scope, a suprabullar cell was dissected and uncapped in a medial-to-lateral direction.  An agger nasi cell was then opened from an retrograde approach.  Additional frontal cells were not present.  Afterward, the frontal sinus ostium was visible but stenotic.  Therefore, the ostium was enlarged anteriorly using cutting instruments, taking care to avoid circumerential mucosal injury.  Purulent exudate was then visible within the sinus lumen and evacuated with irrigation.  Powered instrumentation was not required.  The floor of the frontal sinus was removed between the lamina of the orbit and the suspension of the middle turbinate to complete a Draf IIA sinusotomy.  The anterior ethmoidal artery was identified and avoided with assistance from the image-guidance system probe.  At the conclusion of dissection there was excellent visualization into the frontal sinus, which would not otherwise have been possible.     Attention was then turned to the right side of the sinonasal tract.  A similar procedure was performed on this side, including maxillary antrostomy, total ethmoidectomy, sphenoidotomy and frontal sinus dissection.  On this  side, the right frontal sinus was more well-developed and contained significant purulence, which was sampled for culture.     At the conclusion of these procedures, the image-guidance probe was used to verify that all ethmoid cells had been properly opened and that the skull base was visible and that the lamina papyracea had not been traversed on either side.  All sinuses were copiously irrigated with warm normal saline solution.     At this point, the pledgets were all removed. Nelly hemostatic agent was placed into the surgical sites.  A Propel steroid-eluting stent was placed into the bilateral ethmoid cavities to stent open the surgical site.  The nasal cavity was not packed.  Mupirocin ointment was applied to the vestibule bilaterally.  At this point, the headset was removed and the drapes were taken down. Intravenous dexamethasone was given toward the end of the case. The patient was turned back toward the anesthesiologist and awakened from anesthesia, extubated and transferred to the recovery room in stable condition.      POSTOPERATIVE PLAN:  Compounded antibiotic-steroid sinus rinses based on culture results.

## 2017-04-04 NOTE — PROGRESS NOTES
Progress Note  Lung Transplant        SUBJECTIVE:     Follow-up For: Sinus surgery    No events overnight.  Remains on room air.     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    Cystic fibrosis of the lung     Hypertension     Osteopenia     Other specified disease of pancreas     Pain disorder     Seizures     Sinusitis, chronic     Vocal cord paralysis 06/2014    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       Past Surgical History:   Procedure Laterality Date    ABDOMINAL SURGERY      peg tube     CHOLECYSTECTOMY  2016    ENDOMETRIAL ABLATION  2015    [facility]    LUNG TRANSPLANT Bilateral 6/12/14    PORTACATH PLACEMENT Right     rt sc    SALPINGECTOMY Bilateral 2015    [facility]    SINUS SURGERY         Family History   Problem Relation Age of Onset    Thrombocytopenia Maternal Grandfather     Drug abuse Maternal Grandfather     Breast cancer Neg Hx     Colon cancer Neg Hx     Ovarian cancer Neg Hx        Social History     Social History    Marital status: Single     Spouse name: N/A    Number of children: N/A    Years of education: N/A     Social History Main Topics    Smoking status: Never Smoker    Smokeless tobacco: Never Used    Alcohol use No      Comment: Has not had an alcoholic drink in more than 2 months.    Drug use: No    Sexual activity: Yes     Partners: Male     Birth control/ protection: Implant     Other Topics Concern    None     Social History Narrative       Current Facility-Administered Medications   Medication Dose Route Frequency Provider Last Rate Last Dose    acetaminophen tablet 650 mg  650 mg Oral Q6H PRN  Chambers S. Will, NP        aluminum-magnesium hydroxide-simethicone 200-200-20 mg/5 mL suspension 30 mL  30 mL Oral QID (AC + HS) PRN Reyes Will NP        aripiprazole tablet 2 mg  2 mg Oral Daily Reyes Will NP   2 mg at 04/04/17 0901    azithromycin tablet 500 mg  500 mg Oral Every Mon, Wed, Fri Reyes Will NP   500 mg at 04/03/17 1701    butalbital-acetaminophen-caffeine -40 mg per tablet 1 tablet  1 tablet Oral Q6H PRN Reyes Will NP   1 tablet at 04/03/17 1701    dapsone tablet 100 mg  100 mg Oral Daily Reyes Will NP   100 mg at 04/04/17 0743    dexamethasone injection 8 mg  8 mg Intravenous Q8H Gallito Rachel MD   8 mg at 04/04/17 0613    diphenhydrAMINE injection 25 mg  25 mg Intravenous Q4H PRN Gallito Rachel MD   25 mg at 04/04/17 1145    fluticasone 50 mcg/actuation nasal spray 2 spray  2 spray Each Nare Daily Reyes Will NP   2 spray at 04/04/17 0745    gabapentin capsule 300 mg  300 mg Oral TID Reyes Will NP   300 mg at 04/04/17 0613    HYDROmorphone injection 1.5 mg  1.5 mg Intravenous Q3H PRN Gallito Rachel MD   1.5 mg at 04/04/17 1148    insulin glargine (TOUJEO) pen 5 Units  5 Units Subcutaneous Daily Reyes Will NP   5 Units at 04/04/17 0747    levalbuterol nebulizer solution 1.25 mg  1.25 mg Nebulization Q8H PRN Reyes Will NP        lipase-protease-amylase 24,000-76,000-120,000 units capsule 6 capsule  6 capsule Oral TID WM PRN Reyes Will NP        magnesium sulfate 2g in water 50mL IVPB (premix)  2 g Intravenous PRN Reyes Will NP        And    magnesium sulfate 2g in water 50mL IVPB (premix)  2 g Intravenous PRN Reyes Will NP   2 g at 04/04/17 1122    mirtazapine tablet 30 mg  30 mg Oral QHS Reyes Will NP   30 mg at 04/03/17 2058    mycophenolate capsule 500 mg  500 mg Oral BID Reyes Will NP   500 mg at 04/04/17 0742    ondansetron injection 4 mg  4 mg Intravenous Daily PRN Reyes Will NP         oxycodone-acetaminophen  mg per tablet 1 tablet  1 tablet Oral Q4H PRN Gallito Rachel MD        pantoprazole EC tablet 40 mg  40 mg Oral Daily Chambers LIANA Will, KUMAR        polyethylene glycol packet 17 g  17 g Oral BID [facility] Will, NP   17 g at 04/03/17 2058    potassium chloride 10% solution 40 mEq  40 mEq Oral PRN [facility]        And    potassium chloride 10% solution 40 mEq  40 mEq Oral PRN [facility] Will, NP        And    potassium chloride 10% solution 60 mEq  60 mEq Oral PRN [facility]        predniSONE tablet 5 mg  5 mg Oral Daily [facility], NP   5 mg at 04/04/17 0742    promethazine (PHENERGAN) 25 mg in dextrose 5 % 50 mL IVPB  25 mg Intravenous Q6H PRN [facility],  mL/hr at 04/04/17 0855 25 mg at 04/04/17 0855    sodium chloride 0.9% flush 3 mL  3 mL Intravenous PRN Jorge Rasheed Jr., MD        tacrolimus capsule 3.5 mg  3.5 mg Oral BID [facility], NP   3.5 mg at 04/04/17 0741    tizanidine tablet 8 mg  8 mg Oral Q6H PRN [facility], KUMAR           Review of patient's allergies indicates:   Allergen Reactions    Colistin Anaphylaxis    Vancomycin analogues      Infusion reaction that does not resolve with slowing    Neupogen [filgrastim] Other (See Comments)     Ostealgia after five daily doses of 300 mcg.      Bactrim [sulfamethoxazole-trimethoprim] Hives    Ceftazidime (anhydrous) Hives     Pt stated can tolerate cefapine not ceftazidime    Dronabinol Other (See Comments)     Mental changes/hallucinations    Haldol [haloperidol lactate] Other (See Comments)     Seizure like activity    Nsaids (non-steroidal anti-inflammatory drug)      Cannot have due to lung transplant    Adhesive Rash     Cloth tape- please use tegaderm or paper tape    Aztreonam Rash    Ciprofloxacin Nausea And Vomiting     Projectile N/V, per patient.  Unwilling to retry therapy.       OBJECTIVE:     Vital Signs (Most Recent)  Temp: 98.4 °F (36.9 °C) (04/04/17  1119)  Pulse: 105 (04/04/17 1119)  Resp: 16 (04/04/17 1119)  BP: (!) 148/93 (04/04/17 1119)  SpO2: 95 % (04/04/17 1119)    Vital Signs Range (Last 24H):  Temp:  [98.1 °F (36.7 °C)-98.5 °F (36.9 °C)]   Pulse:  []   Resp:  [9-21]   BP: (133-172)/()   SpO2:  [90 %-100 %]     I & O (Last 24H):    Intake/Output Summary (Last 24 hours) at 04/04/17 1242  Last data filed at 04/04/17 0600   Gross per 24 hour   Intake             1850 ml   Output                0 ml   Net             1850 ml     Physical Exam:  General: well developed, well nourished  HENT: Head:normocephalic, atraumatic.   Lungs:  clear to auscultation bilaterally and normal respiratory effort  Cardiovascular: Heart: regular rate and rhythm  Abdomen: soft, non-tender non-distented; bowel sounds normal; no masses,  no organomegaly.   Neurologic: Normal strength and tone. No focal numbness or weakness    Laboratory:  CBC:    Recent Labs  Lab 04/04/17  0600   WBC 3.28*   RBC 3.56*   HGB 10.2*   HCT 33.2*   *   MCV 93   MCH 28.7   MCHC 30.7*     BMP:    Recent Labs  Lab 04/04/17  0600      K 4.3   *   CO2 18*   BUN 16   CREATININE 1.0   CALCIUM 7.7*      CMP:     Recent Labs  Lab 04/04/17  0600   *   CALCIUM 7.7*      K 4.3   CO2 18*   *   BUN 16   CREATININE 1.0           ASSESSMENT/PLAN:     Active Hospital Problems    Diagnosis  POA    *Chronic ethmoidal sinusitis [J32.2]  Yes    Lung transplant status, bilateral [Z94.2]  Not Applicable      Resolved Hospital Problems    Diagnosis Date Resolved POA   No resolved problems to display.       1. Sinus surgery--defer care to ENT    2. S/P Lung Transplant--no pulmonary issues at this time.  Doing well on room air.     3. Immunosuppression--continue prednisone and tacrolimus    4. Prophylactic antibiotic--continue azithromycin and dapsone.    Reyes Will NP  Lung Transplant

## 2017-04-04 NOTE — PLAN OF CARE
Problem: Patient Care Overview  Goal: Plan of Care Review  Outcome: Ongoing (interventions implemented as appropriate)  Pt encouraged to wash hands. Pt on room air with stable sats. Pt had sinus surgery with dressing on nasal area.

## 2017-04-04 NOTE — PROGRESS NOTES
Consult Note  Lung Transplant      Consult Requested By: Cesar Olsen MD  Reason for Consult: Lung Transplant    SUBJECTIVE:     Follow-up For: Sinus surgery    Patient admitted for sinus surgery by ENT.  No pulmonary issues at this time.  Denies cough, fever, shortness of breath, or any other symptoms.    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    Cystic fibrosis of the lung     Hypertension     Osteopenia     Other specified disease of pancreas     Pain disorder     Seizures     Sinusitis, chronic     Vocal cord paralysis 06/2014    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       Past Surgical History:   Procedure Laterality Date    ABDOMINAL SURGERY      peg tube     CHOLECYSTECTOMY  2016    ENDOMETRIAL ABLATION  2015    [facility]    LUNG TRANSPLANT Bilateral 6/12/14    PORTACATH PLACEMENT Right     rt sc    SALPINGECTOMY Bilateral 2015    [facility]    SINUS SURGERY         Family History   Problem Relation Age of Onset    Thrombocytopenia Maternal Grandfather     Drug abuse Maternal Grandfather     Breast cancer Neg Hx     Colon cancer Neg Hx     Ovarian cancer Neg Hx        Social History     Social History    Marital status: Single     Spouse name: N/A    Number of children: N/A    Years of education: N/A     Social History Main Topics    Smoking status: Never Smoker    Smokeless tobacco: Never Used    Alcohol use No      Comment: Has not had an alcoholic drink in more than 2 months.    Drug use: No    Sexual activity: Yes     Partners: Male     Birth control/ protection: Implant     Other Topics Concern    None     Social  History Narrative       Current Facility-Administered Medications   Medication Dose Route Frequency Provider Last Rate Last Dose    acetaminophen tablet 650 mg  650 mg Oral Q6H PRN Reyes Will NP        aluminum-magnesium hydroxide-simethicone 200-200-20 mg/5 mL suspension 30 mL  30 mL Oral QID (AC + HS) PRN Reyes Will NP        [START ON 4/4/2017] aripiprazole tablet 2 mg  2 mg Oral Daily Reyes Will NP        azithromycin tablet 500 mg  500 mg Oral Every Mon, Wed, Fri Reyes Will NP   500 mg at 04/03/17 1701    butalbital-acetaminophen-caffeine -40 mg per tablet 1 tablet  1 tablet Oral Q6H PRN Reyes Will NP   1 tablet at 04/03/17 1701    [START ON 4/4/2017] dapsone tablet 100 mg  100 mg Oral Daily Reyes Will NP        dexamethasone injection 8 mg  8 mg Intravenous Q8H Gallito Rachel MD   8 mg at 04/03/17 2058    diphenhydrAMINE injection 25 mg  25 mg Intravenous Q4H PRN Gallito Rachel MD   25 mg at 04/03/17 1903    [START ON 4/4/2017] fluticasone 50 mcg/actuation nasal spray 2 spray  2 spray Each Nare Daily Reyes Will NP        gabapentin capsule 300 mg  300 mg Oral TID Reyes Will NP   300 mg at 04/03/17 2058    HYDROmorphone injection 1.5 mg  1.5 mg Intravenous Q3H PRN Gallito Rachel MD   1.5 mg at 04/03/17 2056    [START ON 4/4/2017] insulin glargine (TOUJEO) pen 5 Units  5 Units Subcutaneous Daily Reyes Will NP        levalbuterol nebulizer solution 1.25 mg  1.25 mg Nebulization Q8H PRN Reyes Will NP        lipase-protease-amylase 24,000-76,000-120,000 units capsule 6 capsule  6 capsule Oral TID WM PRN Reyes Will NP        magnesium sulfate 2g in water 50mL IVPB (premix)  2 g Intravenous PRN Reyes Will NP        And    magnesium sulfate 2g in water 50mL IVPB (premix)  2 g Intravenous PRN Reyes Will NP        mirtazapine tablet 30 mg  30 mg Oral QHS Reyes Will NP   30 mg at 04/03/17 2058    mycophenolate  capsule 500 mg  500 mg Oral BID Chambers LIANA Will, KUMAR   500 mg at 04/03/17 2058    ondansetron injection 4 mg  4 mg Intravenous Daily PRN Reyes Will NP        [START ON 4/4/2017] pantoprazole EC tablet 40 mg  40 mg Oral Daily Chambers LIANA Will, KUMAR        polyethylene glycol packet 17 g  17 g Oral BID Reyes Will, KUMAR   17 g at 04/03/17 2058    potassium chloride 10% solution 40 mEq  40 mEq Oral PRN Reyes Will NP        And    potassium chloride 10% solution 40 mEq  40 mEq Oral PRN Chambers LIANA Will NP        And    potassium chloride 10% solution 60 mEq  60 mEq Oral PRN Chambers LIANA Will NP        predniSONE tablet 5 mg  5 mg Oral Daily Chambers LIANA Will NP        promethazine (PHENERGAN) 25 mg in dextrose 5 % 50 mL IVPB  25 mg Intravenous Q6H PRN Chambers LIANA Will NP        sodium chloride 0.9% flush 3 mL  3 mL Intravenous PRN Jorge Rasheed Jr., MD        tacrolimus capsule 3.5 mg  3.5 mg Oral BID Chambers LIANA Will NP   3.5 mg at 04/03/17 1800    tizanidine tablet 8 mg  8 mg Oral Q6H PRN [place] LIANA Will NP           Review of patient's allergies indicates:   Allergen Reactions    Colistin Anaphylaxis    Vancomycin analogues      Infusion reaction that does not resolve with slowing    Neupogen [filgrastim] Other (See Comments)     Ostealgia after five daily doses of 300 mcg.      Bactrim [sulfamethoxazole-trimethoprim] Hives    Ceftazidime (anhydrous) Hives     Pt stated can tolerate cefapine not ceftazidime    Dronabinol Other (See Comments)     Mental changes/hallucinations    Haldol [haloperidol lactate] Other (See Comments)     Seizure like activity    Nsaids (non-steroidal anti-inflammatory drug)      Cannot have due to lung transplant    Adhesive Rash     Cloth tape- please use tegaderm or paper tape    Aztreonam Rash    Ciprofloxacin Nausea And Vomiting     Projectile N/V, per patient.  Unwilling to retry therapy.       OBJECTIVE:     Vital Signs (Most Recent)  Temp: 98.2 °F (36.8 °C)  (04/03/17 1845)  Pulse: 96 (04/03/17 1926)  Resp: 20 (04/03/17 1930)  BP: (!) 148/104 (04/03/17 1915)  SpO2: 100 % (04/03/17 1926)    Vital Signs Range (Last 24H):  Temp:  [98.1 °F (36.7 °C)-98.6 °F (37 °C)]   Pulse:  []   Resp:  [9-21]   BP: (133-159)/()   SpO2:  [90 %-100 %]     I & O (Last 24H):  Intake/Output Summary (Last 24 hours) at 04/03/17 2108  Last data filed at 04/03/17 1411   Gross per 24 hour   Intake             1200 ml   Output                0 ml   Net             1200 ml     Physical Exam:  General: well developed, well nourished  HENT: Head:normocephalic, atraumatic. Ears:bilateral TM's and external ear canals normal, not examined. Nose: not examined, mustache dressing in place. Throat: lips, mucosa, and tongue normal; teeth and gums normal and no throat erythema.  Lungs:  clear to auscultation bilaterally and normal respiratory effort  Cardiovascular: Heart: regular rate and rhythm, S1, S2 normal, no murmur, click, rub or gallop. Chest Wall: no tenderness. Extremities: no cyanosis or edema, or clubbing. Pulses: 2+ and symmetric.  Abdomen/Rectal: Abdomen: soft, non-tender non-distented; bowel sounds normal; no masses,  no organomegaly. Rectal: not examined  Neurologic: Normal strength and tone. No focal numbness or weakness    Laboratory:  CBC:  No results for input(s): WBC, RBC, HGB, HCT, PLT, MCV, MCH, MCHC in the last 72 hours.  BMP:  No results for input(s): GLUCOSE, NA, K, CL, CO2, BUN, CREATININE, CALCIUM in the last 72 hours.   CMP: No results for input(s): GLU, CALCIUM, ALBUMIN, PROT, NA, K, CO2, CL, BUN, CREATININE, ALKPHOS, ALT, AST, BILITOT in the last 72 hours.        ASSESSMENT/PLAN:     Active Hospital Problems    Diagnosis  POA    *Chronic ethmoidal sinusitis [J32.2]  Yes    Lung transplant status, bilateral [Z94.2]  Not Applicable      Resolved Hospital Problems    Diagnosis Date Resolved POA   No resolved problems to display.       1. Sinus surgery--defer care to  ENT    2. S/P Lung Transplant--no pulmonary issues at this time.    3. Immunosuppression--continue prednisone and tacrolimus    4. Prophylactic antibiotic--continue azithromycin and dapsone.    Reyes Will, NP  Lung Transplant

## 2017-04-04 NOTE — PLAN OF CARE
04/04/17 1454   Discharge Assessment   Assessment Type Discharge Planning Assessment   Confirmed/corrected address and phone number on facesheet? Yes   Assessment information obtained from? Patient   Prior to hospitilization cognitive status: Alert/Oriented   Prior to hospitalization functional status: Independent   Current cognitive status: Alert/Oriented   Current Functional Status: Independent   Lives With parent(s)   Able to Return to Prior Arrangements yes   Is patient able to care for self after discharge? Yes   Does the patient have family/friends to help with healtcare needs after discharge? yes   Readmission Within The Last 30 Days planned readmission   Patient currently being followed by outpatient case management? No   Patient currently receives home health services? No   Does the patient currently use HME? No   Patient currently receives private duty nursing? No   Patient currently receives any other outside agency services? No   Is it the patient/care giver preference to resume care with the current outside agency? No   Equipment Currently Used at Home none   Do you have any problems affording any of your prescribed medications? No   Is the patient taking medications as prescribed? yes   Do you have any financial concerns preventing you from receiving the healthcare you need? No   Does the patient have transportation to healthcare appointments? Yes   Transportation Available car;family or friend will provide   On Dialysis? No   Does the patient receive outpatient dialysis? No   Does the patient receive services at the [facility]? No   Are there any open cases? No   Discharge Plan A Home;Home with family   Discharge Plan B Home;Home with family;Home Health   Patient/Family In Agreement With Plan yes

## 2017-04-04 NOTE — PLAN OF CARE
Problem: Patient Care Overview  Goal: Plan of Care Review  Outcome: Ongoing (interventions implemented as appropriate)  Pt aaox3. Pt's magnesium level 1.4 today- 2 magnesium riders given. Left hand 20g removed. Pt has a RSC port- meds given via port and flushes great. Pt's gauze around nose removed- surgical site CDI. Pt has received benadryl x2 dilaudid x2 and phenergan x1 so far today. Pt has some redness on right upper leg and on abdomen from where previous tape was placed. Plan is for pt to be d/agustina tomorrow once pain and itching more manageable. VS stable. Appetite good. UOP good. Pt is free from injuries/falls. No signs of skin breakdown present. Pt is independent and ambulates freely around room. Aunt was at the bedside. Will continue to monitor.

## 2017-04-05 ENCOUNTER — TELEPHONE (OUTPATIENT)
Dept: OTOLARYNGOLOGY | Facility: CLINIC | Age: 27
End: 2017-04-05

## 2017-04-05 VITALS
BODY MASS INDEX: 23.16 KG/M2 | SYSTOLIC BLOOD PRESSURE: 150 MMHG | RESPIRATION RATE: 16 BRPM | DIASTOLIC BLOOD PRESSURE: 98 MMHG | OXYGEN SATURATION: 95 % | TEMPERATURE: 99 F | HEART RATE: 88 BPM | WEIGHT: 118 LBS | HEIGHT: 60 IN

## 2017-04-05 LAB
ANION GAP SERPL CALC-SCNC: 6 MMOL/L
BASOPHILS # BLD AUTO: 0 K/UL
BASOPHILS NFR BLD: 0 %
BUN SERPL-MCNC: 20 MG/DL
CALCIUM SERPL-MCNC: 8.3 MG/DL
CHLORIDE SERPL-SCNC: 108 MMOL/L
CO2 SERPL-SCNC: 25 MMOL/L
CREAT SERPL-MCNC: 0.9 MG/DL
DIFFERENTIAL METHOD: ABNORMAL
EOSINOPHIL # BLD AUTO: 0 K/UL
EOSINOPHIL NFR BLD: 0 %
ERYTHROCYTE [DISTWIDTH] IN BLOOD BY AUTOMATED COUNT: 15.6 %
EST. GFR  (AFRICAN AMERICAN): >60 ML/MIN/1.73 M^2
EST. GFR  (NON AFRICAN AMERICAN): >60 ML/MIN/1.73 M^2
GLUCOSE SERPL-MCNC: 147 MG/DL
HCT VFR BLD AUTO: 26.9 %
HGB BLD-MCNC: 8.7 G/DL
LYMPHOCYTES # BLD AUTO: 1.4 K/UL
LYMPHOCYTES NFR BLD: 16.7 %
MAGNESIUM SERPL-MCNC: 2 MG/DL
MCH RBC QN AUTO: 28.8 PG
MCHC RBC AUTO-ENTMCNC: 32.3 %
MCV RBC AUTO: 89 FL
MONOCYTES # BLD AUTO: 0.4 K/UL
MONOCYTES NFR BLD: 5.3 %
NEUTROPHILS # BLD AUTO: 6.5 K/UL
NEUTROPHILS NFR BLD: 77.9 %
PLATELET # BLD AUTO: 128 K/UL
PMV BLD AUTO: 10.7 FL
POTASSIUM SERPL-SCNC: 4.3 MMOL/L
RBC # BLD AUTO: 3.02 M/UL
SODIUM SERPL-SCNC: 139 MMOL/L
TACROLIMUS BLD-MCNC: 4.6 NG/ML
WBC # BLD AUTO: 8.28 K/UL

## 2017-04-05 PROCEDURE — 99238 HOSP IP/OBS DSCHRG MGMT 30/<: CPT | Mod: ,,, | Performed by: OTOLARYNGOLOGY

## 2017-04-05 PROCEDURE — 63600175 PHARM REV CODE 636 W HCPCS: Performed by: OTOLARYNGOLOGY

## 2017-04-05 PROCEDURE — 63600175 PHARM REV CODE 636 W HCPCS: Performed by: NURSE PRACTITIONER

## 2017-04-05 PROCEDURE — 83735 ASSAY OF MAGNESIUM: CPT

## 2017-04-05 PROCEDURE — 85025 COMPLETE CBC W/AUTO DIFF WBC: CPT

## 2017-04-05 PROCEDURE — 80197 ASSAY OF TACROLIMUS: CPT

## 2017-04-05 PROCEDURE — 80048 BASIC METABOLIC PNL TOTAL CA: CPT

## 2017-04-05 PROCEDURE — 25000003 PHARM REV CODE 250: Performed by: NURSE PRACTITIONER

## 2017-04-05 RX ORDER — OXYCODONE HYDROCHLORIDE 5 MG/1
CAPSULE ORAL
Qty: 60 CAPSULE | Refills: 0 | Status: SHIPPED | OUTPATIENT
Start: 2017-04-05 | End: 2017-04-24 | Stop reason: CLARIF

## 2017-04-05 RX ORDER — HEPARIN 100 UNIT/ML
5 SYRINGE INTRAVENOUS
Status: DISCONTINUED | OUTPATIENT
Start: 2017-04-05 | End: 2017-04-05 | Stop reason: HOSPADM

## 2017-04-05 RX ORDER — OXYCODONE HYDROCHLORIDE 5 MG/1
CAPSULE ORAL
Qty: 60 CAPSULE | Refills: 0 | Status: SHIPPED | OUTPATIENT
Start: 2017-04-05 | End: 2017-04-05

## 2017-04-05 RX ORDER — PREDNISONE 10 MG/1
TABLET ORAL
Qty: 32 TABLET | Refills: 0 | Status: SHIPPED | OUTPATIENT
Start: 2017-04-05 | End: 2017-04-05

## 2017-04-05 RX ORDER — PREDNISONE 10 MG/1
TABLET ORAL
Qty: 32 TABLET | Refills: 0 | Status: ON HOLD | OUTPATIENT
Start: 2017-04-05 | End: 2017-04-18 | Stop reason: DRUGHIGH

## 2017-04-05 RX ADMIN — MYCOPHENOLATE MOFETIL 500 MG: 250 CAPSULE ORAL at 07:04

## 2017-04-05 RX ADMIN — FLUTICASONE PROPIONATE 2 SPRAY: 50 SPRAY, METERED NASAL at 07:04

## 2017-04-05 RX ADMIN — PREDNISONE 5 MG: 5 TABLET ORAL at 07:04

## 2017-04-05 RX ADMIN — GABAPENTIN 300 MG: 300 CAPSULE ORAL at 05:04

## 2017-04-05 RX ADMIN — HYDROMORPHONE HYDROCHLORIDE 1.5 MG: 1 INJECTION, SOLUTION INTRAMUSCULAR; INTRAVENOUS; SUBCUTANEOUS at 05:04

## 2017-04-05 RX ADMIN — DIPHENHYDRAMINE HYDROCHLORIDE 50 MG: 50 INJECTION, SOLUTION INTRAMUSCULAR; INTRAVENOUS at 02:04

## 2017-04-05 RX ADMIN — TACROLIMUS 3.5 MG: 1 CAPSULE, GELATIN COATED ORAL at 07:04

## 2017-04-05 RX ADMIN — DEXAMETHASONE SODIUM PHOSPHATE 8 MG: 4 INJECTION, SOLUTION INTRAMUSCULAR; INTRAVENOUS at 05:04

## 2017-04-05 RX ADMIN — HYDROMORPHONE HYDROCHLORIDE 1.5 MG: 1 INJECTION, SOLUTION INTRAMUSCULAR; INTRAVENOUS; SUBCUTANEOUS at 08:04

## 2017-04-05 RX ADMIN — DAPSONE 100 MG: 100 TABLET ORAL at 07:04

## 2017-04-05 RX ADMIN — DIPHENHYDRAMINE HYDROCHLORIDE 50 MG: 50 INJECTION, SOLUTION INTRAMUSCULAR; INTRAVENOUS at 06:04

## 2017-04-05 RX ADMIN — DIPHENHYDRAMINE HYDROCHLORIDE 50 MG: 50 INJECTION, SOLUTION INTRAMUSCULAR; INTRAVENOUS at 10:04

## 2017-04-05 RX ADMIN — PROMETHAZINE HYDROCHLORIDE 25 MG: 25 INJECTION, SOLUTION INTRAMUSCULAR; INTRAVENOUS at 08:04

## 2017-04-05 RX ADMIN — OMEPRAZOLE 40 MG: 40 CAPSULE, DELAYED RELEASE ORAL at 08:04

## 2017-04-05 RX ADMIN — INSULIN GLARGINE 5 UNITS: 300 INJECTION, SOLUTION SUBCUTANEOUS at 07:04

## 2017-04-05 RX ADMIN — HYDROMORPHONE HYDROCHLORIDE 1.5 MG: 1 INJECTION, SOLUTION INTRAMUSCULAR; INTRAVENOUS; SUBCUTANEOUS at 02:04

## 2017-04-05 RX ADMIN — HEPARIN SODIUM (PORCINE) LOCK FLUSH IV SOLN 100 UNIT/ML 500 UNITS: 100 SOLUTION at 10:04

## 2017-04-05 RX ADMIN — ARIPIPRAZOLE 2 MG: 2 TABLET ORAL at 07:04

## 2017-04-05 NOTE — PHYSICIAN QUERY
PT Name: Juanita Ibarra  MR #: [id number]     Physician Query Form - Documentation Clarification      CDS/: Krupa Jean               Contact information: [email or web address]     This form is a permanent document in the medical record.     Query Date: April 5, 2017    By submitting this query, we are merely seeking further clarification of documentation. Please utilize your independent clinical judgment when addressing the question(s) below.    The Medical record reflects the following:    Supporting Clinical Findings Location in Medical Record   Lung transplant 6/12/14    Cystic fibrosis of the lung    Cystic fibrosis with pulmonary manifestations      Transplant PN 4/4    Transplant PN 4/4    Brief Op Note 4/3 (under pre and post op diagnosis)                                                                                  Doctor, Please specify diagnosis or diagnoses associated with above clinical findings.    Provider Use Only      (  ) Cystic fibrosis with pulmonary manifestations current diagnosis    (x  )  Cystic fibrosis with pulmonary manifestations past medical diagnosis only                                                                                                            [  ] Clinically undetermined

## 2017-04-05 NOTE — PLAN OF CARE
Problem: Patient Care Overview  Goal: Plan of Care Review  Outcome: Ongoing (interventions implemented as appropriate)  Pt encouraged to wash hands. Pt on room air with stable sats. Pt had sinus surgery with dressing on nasal area. Pt has prn pain and itching meds.

## 2017-04-05 NOTE — DISCHARGE SUMMARY
[facility]  Discharge Summary  Otorhinolaryngology      Admit Date: 4/3/2017    Discharge Date and Time:  04/05/2017 8:35 AM    Attending Physician: Cesar Olsen MD     Discharge Physician: Khanh Arellano MD    Reason for Admission: observation post-op ESS, history of cystic fibrosis and lung transplant    Procedures Performed: Procedure(s) (LRB):  SINUS SURGERY FUNCTIONAL ENDOSCOPIC WITH NAVIGATION (Bilateral)    Hospital Course  Patient brought to operating room on 4/3/17 for endoscopic sinus surgery.  Tolerated procedure well and admitted post-operatively due to history of CF and lung transplant.  Pain controlled, nasal drainage resolved, and pulmonary status stable on POD #2, patient ready for discharge.    Consults: pulmonary/intensive care     Final Diagnoses:    Principal Problem: Chronic ethmoidal sinusitis   Secondary Diagnoses: Cystic fibrosis, status post lung transplant    Discharged Condition: stable    Disposition: Home or Self Care    Follow Up/Patient Instructions:      Medications:  Reconciled Home Medications:   Current Discharge Medication List      START taking these medications    Details   oxycodone (OXY-IR) 5 mg Cap 1 capsule (5mg) for pain scale 1-3  2 capsule (10mg) for pain scale 4-6  3 capsule (15 mg) for pain scale 7-10  Every four hours as needed for post-operative pain.  Qty: 60 capsule, Refills: 0      !! predniSONE (DELTASONE) 10 MG tablet Take 4 tablets once daily for 5 days, then:  Take 3 tablets once daily for 2 days  Take 2 tablets once daily for 2 days  Take 1 tablet once daily for 2 days  Qty: 32 tablet, Refills: 0       !! - Potential duplicate medications found. Please discuss with provider.      CONTINUE these medications which have NOT CHANGED    Details   aripiprazole (ABILIFY) 2 MG Tab Take 2 mg by mouth once daily.      azithromycin (ZITHROMAX) 500 MG tablet Take 1 tablet (500 mg total) by mouth every Mon, Wed, Fri.  Qty: 15 tablet, Refills: 11       butalbital-acetaminophen-caffeine -40 mg (FIORICET, ESGIC) -40 mg per tablet TAKE 1 TABLET BY MOUTH EVERY 6 HOURS AS NEEDED FOR HEADACHE  Qty: 120 tablet, Refills: 0      calcium-vitamin D3 500 mg(1,250mg) -200 unit per tablet Take 1 tablet by mouth 2 (two) times daily with meals.  Qty: 60 tablet, Refills: 11    Associated Diagnoses: Osteopenia      CREON 24,000-76,000 -120,000 unit capsule TAKE 5 CAPSULES BY MOUTH WITH MEALS AND 4 CAPSULES WITH SNACKS EVERYDAY  Qty: 810 capsule, Refills: 0    Associated Diagnoses: Cystic fibrosis with gastrointestinal manifestations      dapsone 100 MG Tab TAKE 1 TABLET BY MOUTH EVERY DAY  Qty: 30 tablet, Refills: 11    Associated Diagnoses: Need for pneumocystis prophylaxis      diphenhydrAMINE (BENADRYL) 50 MG tablet Take 1 tablet (50 mg total) by mouth every 6 (six) hours as needed for Itching.  Qty: 1 tablet, Refills: 0      fluticasone (FLONASE) 50 mcg/actuation nasal spray INSERT 2 SPRAY IN EACH NOSTRIL DAILY  Qty: 16 g, Refills: 5      folic acid (FOLVITE) 1 MG tablet TAKE 1 TABLET BY MOUTH EVERY DAY  Qty: 30 tablet, Refills: 11    Associated Diagnoses: Folate deficiency      gabapentin (NEURONTIN) 300 MG capsule Take 1 capsule (300 mg total) by mouth 3 (three) times daily.  Qty: 90 capsule, Refills: 11    Associated Diagnoses: Chronic pain syndrome; Muscle spasm of back      insulin glargine, TOUJEO, (TOUJEO) 300 unit/mL (1.5 mL) InPn pen Inject 5 Units into the skin once daily.      levalbuterol (XOPENEX HFA) 45 mcg/actuation inhaler INHALE 1 TO 2 PUFFS INTO THE LUNGS EVERY 6 HOURS AS NEEDED FOR WHEEZING OR SHORTNESS OF BREATH. USE WITH SPACER.  Qty: 15 g, Refills: 0    Associated Diagnoses: Wheezing      lorazepam (ATIVAN) 2 MG Tab Take 1 tablet (2 mg total) by mouth every 12 (twelve) hours as needed (for anxiety).  Qty: 60 tablet, Refills: 3    Associated Diagnoses: Anxiety      magnesium oxide (MAG-OX) 400 mg tablet Take 1 tablet (400 mg total) by mouth 2  (two) times daily.  Qty: 60 tablet, Refills: 11    Associated Diagnoses: Hypomagnesemia      mirtazapine (REMERON) 15 MG tablet Take 30 mg by mouth every evening. rx'd by PCP       mometasone-formoterol 100-5 mcg/actuation HFAA Inhale 1 puff into the lungs 2 (two) times daily.      montelukast (SINGULAIR) 10 mg tablet TAKE 1 TABLET BY MOUTH EVERY DAY  Qty: 30 tablet, Refills: 11    Associated Diagnoses: Allergic rhinitis      morphine (MS CONTIN) 15 MG 12 hr tablet Take 15 mg by mouth 2 (two) times daily.      mycophenolate (CELLCEPT) 250 mg Cap Take 2 capsules (500 mg total) by mouth 2 (two) times daily.  Qty: 120 capsule, Refills: 11    Associated Diagnoses: Lung replaced by transplant      omeprazole (PRILOSEC) 40 MG capsule TAKE 1 CAPSULE BY MOUTH EVERY MORNING  Qty: 30 capsule, Refills: 11    Associated Diagnoses: Gastroesophageal reflux disease without esophagitis      ondansetron (ZOFRAN) 8 MG tablet Take 1 tablet (8 mg total) by mouth every 8 (eight) hours as needed.  Qty: 30 tablet, Refills: 1    Associated Diagnoses: Nausea      polyethylene glycol (GLYCOLAX) 17 gram PwPk Take 17 g by mouth 2 (two) times daily.  Qty: 60 packet, Refills: 11      !! predniSONE (DELTASONE) 5 MG tablet Take 5 mg by mouth once daily.      promethazine (PHENERGAN) 25 MG tablet TAKE 1 TABLET BY MOUTH EVERY 8 HOURS AS NEEDED FOR NAUSEA  Qty: 45 tablet, Refills: 0      !! tacrolimus (PROGRAF) 0.5 MG Cap Take 1 capsule (0.5 mg total) by mouth every 12 (twelve) hours.  Qty: 60 capsule, Refills: 11    Associated Diagnoses: Lung replaced by transplant      !! tacrolimus (PROGRAF) 1 MG Cap Take 3 capsules (3 mg total) by mouth every 12 (twelve) hours. Daily doses: 3.5 mg every 12 hours.  Qty: 180 capsule, Refills: 11    Associated Diagnoses: Lung replaced by transplant      tizanidine (ZANAFLEX) 4 MG tablet TAKE 2 TABLETS BY MOUTH EVERY 6 HOURS AS NEEDED  Qty: 90 tablet, Refills: 0      aluminum-magnesium hydroxide-simethicone (MAALOX  ADVANCED) 200-200-20 mg/5 mL Susp Take 30 mLs by mouth before meals and at bedtime as needed.  Qty: 148 mL, Refills: 2      mv. min cmb#[id number] (AQUADEKS) 100-700-10 mcg-mcg-mg Cap cap Take 1 capsule by mouth 2 (two) times daily.  Qty: 60 capsule, Refills: 11    Associated Diagnoses: Vitamin deficiency      PROCHAMBER USE AS DIRECTED  Qty: 1 Device, Refills: 0    Associated Diagnoses: Uses nebulizer and inhaler at home       !! - Potential duplicate medications found. Please discuss with provider.        Follow-up Information     Follow up with eCsar Olsen MD. Schedule an appointment as soon as possible for a visit in 1 week.    Specialty:  Otolaryngology    Why:  For post-op visit    Contact information:    [address]  [facility] [address]  [phone]            Discharge Procedure Orders  Diet general     Activity as tolerated     Call MD for:  temperature >100.4     Call MD for:  persistent nausea and vomiting or diarrhea     Call MD for:  difficulty breathing or increased cough     Call MD for:  severe persistent headache     Call MD for:  persistent dizziness, light-headedness, or visual disturbances     No dressing needed

## 2017-04-05 NOTE — PROGRESS NOTES
Otolaryngology - Head and Neck Surgery  Progress Note    Subjective: NAEON, pain controlled, minimal nasal drainage. Ready to go home today, complains of right ear fullness.    Objective:  Temp:  [98.4 °F (36.9 °C)-98.6 °F (37 °C)]   Pulse:  []   Resp:  [16-18]   BP: (140-168)/(92-98)   SpO2:  [92 %-99 %]     NAD, A&O x3   PERRL EMOI   No epistaxis   OP/OC clear   Right external ear and EAC normal. TM without perforations, SHAYY present, non-purulent.    CBC    Recent Labs  Lab 04/05/17  0600   WBC 8.28   HGB 8.7*   HCT 26.9*   MCV 89   *     BMP    Recent Labs  Lab 04/05/17  0600   *      K 4.3      CO2 25   BUN 20   CREATININE 0.9   CALCIUM 8.3*   MG 2.0     Assessment: 28 yo F with CF, hx of lung transplant, POD# 2 bilateral ESS.     Plan:   Stable for discharge today  Appreciate pulmonary team assistance, no medication changes  Steroid taper and narcotic prescriptions provided

## 2017-04-05 NOTE — TELEPHONE ENCOUNTER
Spoke with patient. Complain of ear pain. Informed patient as per Dr. Olsen pain should go away. Instructed to take pain medicine as prescribed and to please call if no improvement. Verbalized understanding. Call PRN.

## 2017-04-05 NOTE — DISCHARGE INSTRUCTIONS
INSTRUCTIONS TO FOLLOW AFTER SINUS SURGERY  DR. McCOUL - [facility]      Your first office visit with Dr. Olsen after surgery should have been already scheduled.  If you dont know when it is, call Dr. Nunn nurse Rosalva at [phone].    ACTIVITY:    Sleep on your back with the head of the bead elevated, up on 2-3 pillows, or in a recliner for the first 3 to 5 days. This will help with swelling.     After surgery you may have a lot of nasal drainage. This is normal. Do not wipe, touch, or dab your nose. You may breathe through your nose if youre able but avoid inhaling forcibly. Let all drainage fall on your mustache dressing and change it as needed.    You may shower 24 hours after surgery.    If you use CPAP or BiPAP to sleep at night, you should wait at least 48 hours before resuming use.  Dr. Olsen will advise you when it is safe to do this.    DRESSINGS:    Change the mustache dressing under your nose as needed. (If unsure what this dressing is or how to do this, ask your doctor or nurse before you leave the clinic/hospital.) You may have pinkish-red drainage for 2-3 days.    In certain cases you may have gauze packing inside your nostrils.  If so, these will turn from white to red from the drainage. This is normal so do not be alarmed. Do not touch or pull at the packing. The packing will be removed by your doctor at your first post-op visit.     You may also have a dissolvable stent or dissolvable sponge placed into the sinuses during surgery.  These usually do not need to be removed unless you are told otherwise by Dr. Olsen.  You may notice small fragments of these items come out of your nose in the weeks following surgery.    MEDICATIONS:  After surgery, you are generally sent home with prescription for an antibiotic, a pain medication, and an anti-nausea medication.     Start your antibiotics when you get home from surgery and take them until they are all gone.  It is best to take them with  food to prevent an upset stomach.    Most people need prescription pain medication for the first few days after surgery.  If you find that this is not necessary, you may use over-the-counter Tylenol (Acetaminophen) instead.    Avoid Aspirin, Motrin (Ibuprofen), Advil, Aleve and Vitamin E for 1 week before surgery and 1 week after surgery. There are many other medications to avoid as well due to the fact they act as blood thinners.      Some people have problems with bowel movements after surgery. If you have NOT had a bowel movement 3-5 days after surgery, go to your local pharmacy and purchase an over the counter stool softener such as COLACE. You can also ask the pharmacist for his or her recommendation. If you still do not have a bowel movement after starting the softener, please call Dr. Nunn office.    You will need these over-the-counter medications after surgery:    Saline Sinus Rinse (Tu Med brand):  You will use this to rinse out your nose and sinuses after surgery.  Begin doing this the day after surgery, unless instructed otherwise by Dr. Olsen.  You should do this 2 times a day, following the instructions on the box.    Afrin (regular strength): Only use if you have nasal congestion or bleeding. Use 2 times per day for 3 days, stop for 1 day, continue 2 times per day for 3 days, then stop completely.  NOTE:  You may not need to do this at all.      RESTRICTED ACTIVITIES AFTER SURGERY:    Do NOT blow your nose for 2 weeks. If you have to sneeze or cough, do so with your mouth open.   AVOID all heavy lifting, straining or bending for 2 weeks.   AVOID any sexual activity for 2 weeks after surgery.  AVOID semi-contact sports or vigorous exercising for 3-4 weeks. Dr. Olsen will let you know when you are cleared to resume exercise.  No Swimming for 3-4 weeks.  No Flying for 2 weeks.    Do NOT operate a motor vehicle or any type of heavy machinery within 24 hours of taking pain medication.  DO NOT smoke  or be around smokers.  AVOID irritating substances such as dust, chalk, harsh chemicals, and allergic triggers.    DIET:    Avoid hot and spicy foods, or salty soups for 1 week after surgery.  Begin with bland foods the day after surgery and advance to your regular diet as tolerated.  It is not necessary to take only soft food unless you are recovering from tonsil surgery.  Drink plenty of fluids (water is best).   Avoid alcoholic and caffeinated beverages for 1 week after surgery.

## 2017-04-06 ENCOUNTER — TELEPHONE (OUTPATIENT)
Dept: TRANSPLANT | Facility: CLINIC | Age: 27
End: 2017-04-06

## 2017-04-06 ENCOUNTER — PATIENT MESSAGE (OUTPATIENT)
Dept: OTOLARYNGOLOGY | Facility: CLINIC | Age: 27
End: 2017-04-06

## 2017-04-06 DIAGNOSIS — R11.0 NAUSEA: Primary | ICD-10-CM

## 2017-04-06 LAB
BACTERIA SPEC AEROBE CULT: NORMAL

## 2017-04-06 NOTE — TELEPHONE ENCOUNTER
----- Message from Basilia Evans sent at 4/6/2017  1:18 PM CDT -----  Contact: pt  Asked to speak with Zuleima and would like to have a call back [phone]

## 2017-04-06 NOTE — TELEPHONE ENCOUNTER
Returned call to patient, who stated she received lab results via [facility] and was concerned about her WBC count = 3.28.  Reviewed labs, and informed patient that her WBC count = 3.28 on April 4th, but had increased to 8.28 on April 5th.  Patient wanted to ensure that her lung transplant team had reviewed her labs, as she is scheduled for surgery (hysterectomy) next week and wants to be sure her blood count is safe for surgery.  The patient denied having any questions and verbalized her understanding of all information discussed.

## 2017-04-07 ENCOUNTER — PATIENT MESSAGE (OUTPATIENT)
Dept: TRANSPLANT | Facility: CLINIC | Age: 27
End: 2017-04-07

## 2017-04-07 LAB
BACTERIA SPEC ANAEROBE CULT: NORMAL
BACTERIA SPEC ANAEROBE CULT: NORMAL

## 2017-04-07 RX ORDER — PROMETHAZINE HYDROCHLORIDE 25 MG/1
TABLET ORAL
Qty: 45 TABLET | Refills: 1 | Status: ON HOLD | OUTPATIENT
Start: 2017-04-07 | End: 2017-04-18 | Stop reason: SDUPTHER

## 2017-04-08 ENCOUNTER — NURSE TRIAGE (OUTPATIENT)
Dept: ADMINISTRATIVE | Facility: CLINIC | Age: 27
End: 2017-04-08

## 2017-04-08 NOTE — TELEPHONE ENCOUNTER
"  Reason for Disposition   General information question, no triage required and triager able to answer question    Answer Assessment - Initial Assessment Questions  1. SYMPTOM: "What's the main symptom you're concerned about?" (e.g., pain, fever, vomiting)      Right ear is draining profusely,right side of face is swollen  2. ONSET: "When did ________  start?"      Late Wednesday evening  3. SURGERY: "What surgery was performed?"      Sinus surgery  4. DATE of SURGERY: "When was surgery performed?"       Monday  5. ANESTHESIA: " What type of anesthesia did you have?" (e.g., general, spinal, epidural, local)      general  6. PAIN: "Is there any pain?" If so, ask: "How bad is it?"  (Scale 1-10; or mild, moderate, severe)      severe  7. FEVER: "Do you have a fever?" If so, ask: "What is your temperature, how was it measured, and when did it start?"      On rejection medication  8. VOMITING: "Is there any vomiting?" If yes, ask: "How many times?"      nauseated  9. BLEEDING: "Is there any bleeding?" If so, ask: "How much?" and "Where?"      crystallized fluid  10. OTHER SYMPTOMS: "Do you have any other symptoms?" (e.g., drainage from wound, painful urination, constipation)        pain    Answer Assessment - Initial Assessment Questions  1. REASON FOR CALL or QUESTION: "What is your reason for calling today?" or "How can I best help you?" or "What question do you have that I can help answer?"      Notify ENT of admit    Protocols used:  INFORMATION ONLY CALL-A-,  POST-OP SYMPTOMS AND [id number]  Dr. Philippe has returned call.  He is informed that per mom patient is admitted to hospital facility in [facility].  Mother is aware that MD at that facility would have to initiate contact/consult for any intervention. Mom voices understanding.    "

## 2017-04-10 ENCOUNTER — TELEPHONE (OUTPATIENT)
Dept: OTOLARYNGOLOGY | Facility: CLINIC | Age: 27
End: 2017-04-10

## 2017-04-13 ENCOUNTER — TELEPHONE (OUTPATIENT)
Dept: OTOLARYNGOLOGY | Facility: CLINIC | Age: 27
End: 2017-04-13

## 2017-04-13 NOTE — TELEPHONE ENCOUNTER
Post op appt scheduled. Reports still being hospital admitted for ear infection. Informed patient that if Physicians there would like to speak with Physicians here at [facility] its ok to call. Contact information given to patient. Dr. Chirinos informed. Call PRN.

## 2017-04-14 PROBLEM — H70.90 MASTOIDITIS: Status: ACTIVE | Noted: 2017-04-14

## 2017-04-15 ENCOUNTER — ANESTHESIA EVENT (OUTPATIENT)
Dept: SURGERY | Facility: HOSPITAL | Age: 27
DRG: 133 | End: 2017-04-15
Payer: MEDICAID

## 2017-04-15 ENCOUNTER — HOSPITAL ENCOUNTER (INPATIENT)
Facility: HOSPITAL | Age: 27
LOS: 3 days | Discharge: HOME | DRG: 133 | End: 2017-04-18
Attending: INTERNAL MEDICINE | Admitting: INTERNAL MEDICINE
Payer: MEDICAID

## 2017-04-15 ENCOUNTER — ANESTHESIA (OUTPATIENT)
Dept: SURGERY | Facility: HOSPITAL | Age: 27
DRG: 133 | End: 2017-04-15
Payer: MEDICAID

## 2017-04-15 DIAGNOSIS — H70.91 MASTOIDITIS, RIGHT: ICD-10-CM

## 2017-04-15 DIAGNOSIS — E84.0 CYSTIC FIBROSIS WITH PULMONARY MANIFESTATIONS: ICD-10-CM

## 2017-04-15 DIAGNOSIS — J32.2 CHRONIC ETHMOIDAL SINUSITIS: ICD-10-CM

## 2017-04-15 DIAGNOSIS — E08.9 DIABETES MELLITUS RELATED TO CYSTIC FIBROSIS: ICD-10-CM

## 2017-04-15 DIAGNOSIS — H65.191 ACUTE MUCOID OTITIS MEDIA OF RIGHT EAR: ICD-10-CM

## 2017-04-15 DIAGNOSIS — Z94.2 LUNG TRANSPLANT STATUS, BILATERAL: ICD-10-CM

## 2017-04-15 DIAGNOSIS — D84.9 IMMUNOSUPPRESSION: ICD-10-CM

## 2017-04-15 DIAGNOSIS — H70.90 MASTOIDITIS: Primary | ICD-10-CM

## 2017-04-15 DIAGNOSIS — J32.9 CHRONIC SINUSITIS, UNSPECIFIED LOCATION: ICD-10-CM

## 2017-04-15 DIAGNOSIS — T38.0X5D ADVERSE EFFECT OF GLUCOCORTICOID OR SYNTHETIC ANALOGUE, SUBSEQUENT ENCOUNTER: ICD-10-CM

## 2017-04-15 DIAGNOSIS — E84.8 DIABETES MELLITUS RELATED TO CYSTIC FIBROSIS: ICD-10-CM

## 2017-04-15 LAB
ANION GAP SERPL CALC-SCNC: 7 MMOL/L
BASOPHILS # BLD AUTO: 0.01 K/UL
BASOPHILS NFR BLD: 0.1 %
BUN SERPL-MCNC: 15 MG/DL
CALCIUM SERPL-MCNC: 8.3 MG/DL
CHLORIDE SERPL-SCNC: 107 MMOL/L
CO2 SERPL-SCNC: 27 MMOL/L
CREAT SERPL-MCNC: 0.9 MG/DL
DIFFERENTIAL METHOD: ABNORMAL
EOSINOPHIL # BLD AUTO: 0.1 K/UL
EOSINOPHIL NFR BLD: 1.5 %
ERYTHROCYTE [DISTWIDTH] IN BLOOD BY AUTOMATED COUNT: 14.9 %
EST. GFR  (AFRICAN AMERICAN): >60 ML/MIN/1.73 M^2
EST. GFR  (NON AFRICAN AMERICAN): >60 ML/MIN/1.73 M^2
GLUCOSE SERPL-MCNC: 122 MG/DL
HCT VFR BLD AUTO: 29.3 %
HGB BLD-MCNC: 9.1 G/DL
LYMPHOCYTES # BLD AUTO: 2.7 K/UL
LYMPHOCYTES NFR BLD: 36.1 %
MAGNESIUM SERPL-MCNC: 1.6 MG/DL
MCH RBC QN AUTO: 29.3 PG
MCHC RBC AUTO-ENTMCNC: 31.1 %
MCV RBC AUTO: 94 FL
MONOCYTES # BLD AUTO: 0.4 K/UL
MONOCYTES NFR BLD: 5.7 %
NEUTROPHILS # BLD AUTO: 4.2 K/UL
NEUTROPHILS NFR BLD: 56.1 %
PLATELET # BLD AUTO: 123 K/UL
PMV BLD AUTO: 9.6 FL
POCT GLUCOSE: 107 MG/DL (ref 70–110)
POCT GLUCOSE: 185 MG/DL (ref 70–110)
POCT GLUCOSE: 337 MG/DL (ref 70–110)
POCT GLUCOSE: 74 MG/DL (ref 70–110)
POTASSIUM SERPL-SCNC: 3.9 MMOL/L
PROCALCITONIN SERPL IA-MCNC: <0.09 NG/ML
RBC # BLD AUTO: 3.11 M/UL
SODIUM SERPL-SCNC: 141 MMOL/L
TACROLIMUS BLD-MCNC: 9.5 NG/ML
WBC # BLD AUTO: 7.43 K/UL

## 2017-04-15 PROCEDURE — 099500Z DRAINAGE OF RIGHT MIDDLE EAR WITH DRAINAGE DEVICE, OPEN APPROACH: ICD-10-PCS | Performed by: OTOLARYNGOLOGY

## 2017-04-15 PROCEDURE — 25000003 PHARM REV CODE 250: Performed by: OTOLARYNGOLOGY

## 2017-04-15 PROCEDURE — 63600175 PHARM REV CODE 636 W HCPCS: Performed by: INTERNAL MEDICINE

## 2017-04-15 PROCEDURE — 63600175 PHARM REV CODE 636 W HCPCS: Performed by: NURSE ANESTHETIST, CERTIFIED REGISTERED

## 2017-04-15 PROCEDURE — 87102 FUNGUS ISOLATION CULTURE: CPT

## 2017-04-15 PROCEDURE — 83735 ASSAY OF MAGNESIUM: CPT

## 2017-04-15 PROCEDURE — D9220A PRA ANESTHESIA: Mod: ANES,,, | Performed by: ANESTHESIOLOGY

## 2017-04-15 PROCEDURE — 87116 MYCOBACTERIA CULTURE: CPT

## 2017-04-15 PROCEDURE — 80048 BASIC METABOLIC PNL TOTAL CA: CPT

## 2017-04-15 PROCEDURE — 37000008 HC ANESTHESIA 1ST 15 MINUTES: Performed by: OTOLARYNGOLOGY

## 2017-04-15 PROCEDURE — 87075 CULTR BACTERIA EXCEPT BLOOD: CPT

## 2017-04-15 PROCEDURE — 27800903 OPTIME MED/SURG SUP & DEVICES OTHER IMPLANTS: Performed by: OTOLARYNGOLOGY

## 2017-04-15 PROCEDURE — 71000033 HC RECOVERY, INTIAL HOUR: Performed by: OTOLARYNGOLOGY

## 2017-04-15 PROCEDURE — 84145 PROCALCITONIN (PCT): CPT

## 2017-04-15 PROCEDURE — 25000003 PHARM REV CODE 250: Performed by: NURSE ANESTHETIST, CERTIFIED REGISTERED

## 2017-04-15 PROCEDURE — 85025 COMPLETE CBC W/AUTO DIFF WBC: CPT

## 2017-04-15 PROCEDURE — 69420 INCISION OF EARDRUM: CPT | Mod: ,,, | Performed by: OTOLARYNGOLOGY

## 2017-04-15 PROCEDURE — 80197 ASSAY OF TACROLIMUS: CPT

## 2017-04-15 PROCEDURE — 99232 SBSQ HOSP IP/OBS MODERATE 35: CPT | Mod: ,,, | Performed by: INTERNAL MEDICINE

## 2017-04-15 PROCEDURE — 20600001 HC STEP DOWN PRIVATE ROOM

## 2017-04-15 PROCEDURE — D9220A PRA ANESTHESIA: Mod: CRNA,,, | Performed by: NURSE ANESTHETIST, CERTIFIED REGISTERED

## 2017-04-15 PROCEDURE — 36000705 HC OR TIME LEV I EA ADD 15 MIN: Performed by: OTOLARYNGOLOGY

## 2017-04-15 PROCEDURE — 63600175 PHARM REV CODE 636 W HCPCS

## 2017-04-15 PROCEDURE — 87070 CULTURE OTHR SPECIMN AEROBIC: CPT

## 2017-04-15 PROCEDURE — 25500020 PHARM REV CODE 255: Performed by: INTERNAL MEDICINE

## 2017-04-15 PROCEDURE — 99223 1ST HOSP IP/OBS HIGH 75: CPT | Mod: AI,,, | Performed by: INTERNAL MEDICINE

## 2017-04-15 PROCEDURE — 71000039 HC RECOVERY, EACH ADD'L HOUR: Performed by: OTOLARYNGOLOGY

## 2017-04-15 PROCEDURE — 36000704 HC OR TIME LEV I 1ST 15 MIN: Performed by: OTOLARYNGOLOGY

## 2017-04-15 PROCEDURE — 25000003 PHARM REV CODE 250: Performed by: INTERNAL MEDICINE

## 2017-04-15 PROCEDURE — 37000009 HC ANESTHESIA EA ADD 15 MINS: Performed by: OTOLARYNGOLOGY

## 2017-04-15 PROCEDURE — 36415 COLL VENOUS BLD VENIPUNCTURE: CPT

## 2017-04-15 PROCEDURE — 63600175 PHARM REV CODE 636 W HCPCS: Performed by: ANESTHESIOLOGY

## 2017-04-15 DEVICE — TUBE VENT FLUORO 1.14M: Type: IMPLANTABLE DEVICE | Site: EAR | Status: FUNCTIONAL

## 2017-04-15 RX ORDER — INSULIN ASPART 100 [IU]/ML
2 INJECTION, SOLUTION INTRAVENOUS; SUBCUTANEOUS
Status: DISCONTINUED | OUTPATIENT
Start: 2017-04-15 | End: 2017-04-17

## 2017-04-15 RX ORDER — POLYETHYLENE GLYCOL 3350 17 G/17G
17 POWDER, FOR SOLUTION ORAL 2 TIMES DAILY
Status: DISCONTINUED | OUTPATIENT
Start: 2017-04-15 | End: 2017-04-18 | Stop reason: HOSPADM

## 2017-04-15 RX ORDER — HYDROMORPHONE HYDROCHLORIDE 1 MG/ML
1.5 INJECTION, SOLUTION INTRAMUSCULAR; INTRAVENOUS; SUBCUTANEOUS
Status: DISCONTINUED | OUTPATIENT
Start: 2017-04-15 | End: 2017-04-15

## 2017-04-15 RX ORDER — CEFEPIME HYDROCHLORIDE 2 G/50ML
2 INJECTION, SOLUTION INTRAVENOUS
Status: DISCONTINUED | OUTPATIENT
Start: 2017-04-15 | End: 2017-04-17

## 2017-04-15 RX ORDER — PROPOFOL 10 MG/ML
VIAL (ML) INTRAVENOUS
Status: DISCONTINUED | OUTPATIENT
Start: 2017-04-15 | End: 2017-04-15

## 2017-04-15 RX ORDER — DAPSONE 100 MG/1
100 TABLET ORAL DAILY
Status: DISCONTINUED | OUTPATIENT
Start: 2017-04-15 | End: 2017-04-18 | Stop reason: HOSPADM

## 2017-04-15 RX ORDER — IBUPROFEN 200 MG
16 TABLET ORAL
Status: DISCONTINUED | OUTPATIENT
Start: 2017-04-15 | End: 2017-04-15

## 2017-04-15 RX ORDER — ONDANSETRON 2 MG/ML
4 INJECTION INTRAMUSCULAR; INTRAVENOUS DAILY PRN
Status: DISCONTINUED | OUTPATIENT
Start: 2017-04-15 | End: 2017-04-18 | Stop reason: HOSPADM

## 2017-04-15 RX ORDER — TIZANIDINE 4 MG/1
8 TABLET ORAL EVERY 6 HOURS PRN
Status: DISCONTINUED | OUTPATIENT
Start: 2017-04-15 | End: 2017-04-18 | Stop reason: HOSPADM

## 2017-04-15 RX ORDER — HYDROMORPHONE HYDROCHLORIDE 1 MG/ML
0.2 INJECTION, SOLUTION INTRAMUSCULAR; INTRAVENOUS; SUBCUTANEOUS EVERY 5 MIN PRN
Status: COMPLETED | OUTPATIENT
Start: 2017-04-15 | End: 2017-04-15

## 2017-04-15 RX ORDER — MAG HYDROX/ALUMINUM HYD/SIMETH 200-200-20
30 SUSPENSION, ORAL (FINAL DOSE FORM) ORAL
Status: DISCONTINUED | OUTPATIENT
Start: 2017-04-15 | End: 2017-04-18 | Stop reason: HOSPADM

## 2017-04-15 RX ORDER — LORAZEPAM 1 MG/1
2 TABLET ORAL EVERY 12 HOURS PRN
Status: DISCONTINUED | OUTPATIENT
Start: 2017-04-15 | End: 2017-04-15

## 2017-04-15 RX ORDER — GLUCAGON 1 MG
1 KIT INJECTION
Status: DISCONTINUED | OUTPATIENT
Start: 2017-04-15 | End: 2017-04-15

## 2017-04-15 RX ORDER — SODIUM CHLORIDE 0.9 % (FLUSH) 0.9 %
3 SYRINGE (ML) INJECTION EVERY 8 HOURS
Status: DISCONTINUED | OUTPATIENT
Start: 2017-04-16 | End: 2017-04-18 | Stop reason: HOSPADM

## 2017-04-15 RX ORDER — HYDROMORPHONE HYDROCHLORIDE 1 MG/ML
1.5 INJECTION, SOLUTION INTRAMUSCULAR; INTRAVENOUS; SUBCUTANEOUS EVERY 8 HOURS PRN
Status: DISCONTINUED | OUTPATIENT
Start: 2017-04-15 | End: 2017-04-15

## 2017-04-15 RX ORDER — LEVALBUTEROL 1.25 MG/.5ML
1.25 SOLUTION, CONCENTRATE RESPIRATORY (INHALATION) EVERY 8 HOURS PRN
Status: DISCONTINUED | OUTPATIENT
Start: 2017-04-15 | End: 2017-04-18 | Stop reason: HOSPADM

## 2017-04-15 RX ORDER — HYDROMORPHONE HYDROCHLORIDE 1 MG/ML
0.2 INJECTION, SOLUTION INTRAMUSCULAR; INTRAVENOUS; SUBCUTANEOUS EVERY 5 MIN PRN
Status: DISCONTINUED | OUTPATIENT
Start: 2017-04-16 | End: 2017-04-15

## 2017-04-15 RX ORDER — INSULIN GLARGINE 300 [IU]/ML
5 INJECTION, SOLUTION SUBCUTANEOUS DAILY
Status: DISCONTINUED | OUTPATIENT
Start: 2017-04-15 | End: 2017-04-15

## 2017-04-15 RX ORDER — HYDROMORPHONE HYDROCHLORIDE 1 MG/ML
INJECTION, SOLUTION INTRAMUSCULAR; INTRAVENOUS; SUBCUTANEOUS
Status: COMPLETED
Start: 2017-04-15 | End: 2017-04-15

## 2017-04-15 RX ORDER — GLUCAGON 1 MG
1 KIT INJECTION
Status: DISCONTINUED | OUTPATIENT
Start: 2017-04-15 | End: 2017-04-18 | Stop reason: HOSPADM

## 2017-04-15 RX ORDER — PROMETHAZINE HYDROCHLORIDE 25 MG/ML
25 INJECTION, SOLUTION INTRAMUSCULAR; INTRAVENOUS EVERY 4 HOURS PRN
Status: DISCONTINUED | OUTPATIENT
Start: 2017-04-15 | End: 2017-04-15

## 2017-04-15 RX ORDER — FAMOTIDINE 10 MG/ML
INJECTION INTRAVENOUS
Status: DISCONTINUED | OUTPATIENT
Start: 2017-04-15 | End: 2017-04-15

## 2017-04-15 RX ORDER — CIPROFLOXACIN AND DEXAMETHASONE 3; 1 MG/ML; MG/ML
SUSPENSION/ DROPS AURICULAR (OTIC)
Status: DISCONTINUED | OUTPATIENT
Start: 2017-04-15 | End: 2017-04-15 | Stop reason: HOSPADM

## 2017-04-15 RX ORDER — BUTALBITAL, ACETAMINOPHEN AND CAFFEINE 50; 325; 40 MG/1; MG/1; MG/1
1 TABLET ORAL EVERY 6 HOURS PRN
Status: DISCONTINUED | OUTPATIENT
Start: 2017-04-15 | End: 2017-04-18 | Stop reason: HOSPADM

## 2017-04-15 RX ORDER — CIPROFLOXACIN AND DEXAMETHASONE 3; 1 MG/ML; MG/ML
SUSPENSION/ DROPS AURICULAR (OTIC)
Status: DISPENSED
Start: 2017-04-15 | End: 2017-04-16

## 2017-04-15 RX ORDER — IBUPROFEN 200 MG
24 TABLET ORAL
Status: DISCONTINUED | OUTPATIENT
Start: 2017-04-15 | End: 2017-04-15

## 2017-04-15 RX ORDER — FLUTICASONE PROPIONATE 50 MCG
2 SPRAY, SUSPENSION (ML) NASAL DAILY
Status: DISCONTINUED | OUTPATIENT
Start: 2017-04-15 | End: 2017-04-18 | Stop reason: HOSPADM

## 2017-04-15 RX ORDER — ACETAMINOPHEN 325 MG/1
650 TABLET ORAL EVERY 6 HOURS PRN
Status: DISCONTINUED | OUTPATIENT
Start: 2017-04-15 | End: 2017-04-18 | Stop reason: HOSPADM

## 2017-04-15 RX ORDER — POTASSIUM CHLORIDE 20 MEQ/15ML
60 SOLUTION ORAL
Status: DISCONTINUED | OUTPATIENT
Start: 2017-04-15 | End: 2017-04-18 | Stop reason: HOSPADM

## 2017-04-15 RX ORDER — MIDAZOLAM HYDROCHLORIDE 1 MG/ML
INJECTION, SOLUTION INTRAMUSCULAR; INTRAVENOUS
Status: DISCONTINUED | OUTPATIENT
Start: 2017-04-15 | End: 2017-04-15

## 2017-04-15 RX ORDER — FENTANYL CITRATE 50 UG/ML
INJECTION, SOLUTION INTRAMUSCULAR; INTRAVENOUS
Status: DISCONTINUED | OUTPATIENT
Start: 2017-04-15 | End: 2017-04-15

## 2017-04-15 RX ORDER — HYDROMORPHONE HYDROCHLORIDE 1 MG/ML
0.2 INJECTION, SOLUTION INTRAMUSCULAR; INTRAVENOUS; SUBCUTANEOUS EVERY 5 MIN PRN
Status: COMPLETED | OUTPATIENT
Start: 2017-04-16 | End: 2017-04-15

## 2017-04-15 RX ORDER — ONDANSETRON 2 MG/ML
INJECTION INTRAMUSCULAR; INTRAVENOUS
Status: DISCONTINUED | OUTPATIENT
Start: 2017-04-15 | End: 2017-04-15

## 2017-04-15 RX ORDER — INSULIN ASPART 100 [IU]/ML
0-5 INJECTION, SOLUTION INTRAVENOUS; SUBCUTANEOUS
Status: DISCONTINUED | OUTPATIENT
Start: 2017-04-15 | End: 2017-04-18 | Stop reason: HOSPADM

## 2017-04-15 RX ORDER — INSULIN ASPART 100 [IU]/ML
0-5 INJECTION, SOLUTION INTRAVENOUS; SUBCUTANEOUS
Status: DISCONTINUED | OUTPATIENT
Start: 2017-04-15 | End: 2017-04-15

## 2017-04-15 RX ORDER — GABAPENTIN 300 MG/1
300 CAPSULE ORAL 3 TIMES DAILY
Status: DISCONTINUED | OUTPATIENT
Start: 2017-04-15 | End: 2017-04-18 | Stop reason: HOSPADM

## 2017-04-15 RX ORDER — SODIUM CHLORIDE 0.9 % (FLUSH) 0.9 %
3 SYRINGE (ML) INJECTION
Status: DISCONTINUED | OUTPATIENT
Start: 2017-04-15 | End: 2017-04-18 | Stop reason: HOSPADM

## 2017-04-15 RX ORDER — MIRTAZAPINE 15 MG/1
30 TABLET, FILM COATED ORAL NIGHTLY
Status: DISCONTINUED | OUTPATIENT
Start: 2017-04-15 | End: 2017-04-18 | Stop reason: HOSPADM

## 2017-04-15 RX ORDER — OXYCODONE AND ACETAMINOPHEN 10; 325 MG/1; MG/1
1 TABLET ORAL EVERY 4 HOURS PRN
Status: DISCONTINUED | OUTPATIENT
Start: 2017-04-15 | End: 2017-04-18 | Stop reason: HOSPADM

## 2017-04-15 RX ORDER — HYDROMORPHONE HYDROCHLORIDE 1 MG/ML
1.5 INJECTION, SOLUTION INTRAMUSCULAR; INTRAVENOUS; SUBCUTANEOUS EVERY 6 HOURS PRN
Status: DISCONTINUED | OUTPATIENT
Start: 2017-04-15 | End: 2017-04-18 | Stop reason: HOSPADM

## 2017-04-15 RX ORDER — OXYMETAZOLINE HCL 0.05 %
SPRAY, NON-AEROSOL (ML) NASAL
Status: DISCONTINUED | OUTPATIENT
Start: 2017-04-15 | End: 2017-04-15 | Stop reason: HOSPADM

## 2017-04-15 RX ORDER — ONDANSETRON 2 MG/ML
4 INJECTION INTRAMUSCULAR; INTRAVENOUS ONCE
Status: DISCONTINUED | OUTPATIENT
Start: 2017-04-15 | End: 2017-04-18 | Stop reason: HOSPADM

## 2017-04-15 RX ORDER — ARIPIPRAZOLE 2 MG/1
2 TABLET ORAL DAILY
Status: DISCONTINUED | OUTPATIENT
Start: 2017-04-15 | End: 2017-04-18 | Stop reason: HOSPADM

## 2017-04-15 RX ORDER — POTASSIUM CHLORIDE 20 MEQ/15ML
40 SOLUTION ORAL
Status: DISCONTINUED | OUTPATIENT
Start: 2017-04-15 | End: 2017-04-18 | Stop reason: HOSPADM

## 2017-04-15 RX ORDER — PANTOPRAZOLE SODIUM 40 MG/1
40 TABLET, DELAYED RELEASE ORAL DAILY
Status: DISCONTINUED | OUTPATIENT
Start: 2017-04-15 | End: 2017-04-18 | Stop reason: HOSPADM

## 2017-04-15 RX ORDER — MYCOPHENOLATE MOFETIL 250 MG/1
500 CAPSULE ORAL 2 TIMES DAILY
Status: DISCONTINUED | OUTPATIENT
Start: 2017-04-15 | End: 2017-04-18 | Stop reason: HOSPADM

## 2017-04-15 RX ORDER — IBUPROFEN 200 MG
24 TABLET ORAL
Status: DISCONTINUED | OUTPATIENT
Start: 2017-04-15 | End: 2017-04-18 | Stop reason: HOSPADM

## 2017-04-15 RX ORDER — HEPARIN 100 UNIT/ML
5 SYRINGE INTRAVENOUS
Status: DISCONTINUED | OUTPATIENT
Start: 2017-04-15 | End: 2017-04-18 | Stop reason: HOSPADM

## 2017-04-15 RX ORDER — DEXAMETHASONE SODIUM PHOSPHATE 4 MG/ML
8 INJECTION, SOLUTION INTRA-ARTICULAR; INTRALESIONAL; INTRAMUSCULAR; INTRAVENOUS; SOFT TISSUE EVERY 8 HOURS
Status: DISCONTINUED | OUTPATIENT
Start: 2017-04-15 | End: 2017-04-15

## 2017-04-15 RX ORDER — IBUPROFEN 200 MG
16 TABLET ORAL
Status: DISCONTINUED | OUTPATIENT
Start: 2017-04-15 | End: 2017-04-18 | Stop reason: HOSPADM

## 2017-04-15 RX ORDER — DIPHENHYDRAMINE HYDROCHLORIDE 50 MG/ML
50 INJECTION INTRAMUSCULAR; INTRAVENOUS EVERY 4 HOURS PRN
Status: DISCONTINUED | OUTPATIENT
Start: 2017-04-15 | End: 2017-04-18 | Stop reason: HOSPADM

## 2017-04-15 RX ORDER — AZITHROMYCIN 250 MG/1
500 TABLET, FILM COATED ORAL
Status: DISCONTINUED | OUTPATIENT
Start: 2017-04-17 | End: 2017-04-18 | Stop reason: HOSPADM

## 2017-04-15 RX ORDER — PREDNISONE 5 MG/1
5 TABLET ORAL DAILY
Status: DISCONTINUED | OUTPATIENT
Start: 2017-04-15 | End: 2017-04-18 | Stop reason: HOSPADM

## 2017-04-15 RX ORDER — LORAZEPAM 1 MG/1
2 TABLET ORAL EVERY 8 HOURS PRN
Status: DISCONTINUED | OUTPATIENT
Start: 2017-04-15 | End: 2017-04-18 | Stop reason: HOSPADM

## 2017-04-15 RX ADMIN — TACROLIMUS 3.5 MG: 1 CAPSULE ORAL at 08:04

## 2017-04-15 RX ADMIN — FAMOTIDINE 20 MG: 10 INJECTION, SOLUTION INTRAVENOUS at 09:04

## 2017-04-15 RX ADMIN — IOHEXOL 75 ML: 350 INJECTION, SOLUTION INTRAVENOUS at 04:04

## 2017-04-15 RX ADMIN — INSULIN ASPART 4 UNITS: 100 INJECTION, SOLUTION INTRAVENOUS; SUBCUTANEOUS at 02:04

## 2017-04-15 RX ADMIN — GABAPENTIN 300 MG: 300 CAPSULE ORAL at 06:04

## 2017-04-15 RX ADMIN — OXYCODONE HYDROCHLORIDE AND ACETAMINOPHEN 1 TABLET: 10; 325 TABLET ORAL at 10:04

## 2017-04-15 RX ADMIN — HYDROMORPHONE HYDROCHLORIDE 0.2 MG: 1 INJECTION, SOLUTION INTRAMUSCULAR; INTRAVENOUS; SUBCUTANEOUS at 11:04

## 2017-04-15 RX ADMIN — HYDROMORPHONE HYDROCHLORIDE 1.5 MG: 1 INJECTION, SOLUTION INTRAMUSCULAR; INTRAVENOUS; SUBCUTANEOUS at 05:04

## 2017-04-15 RX ADMIN — HYDROMORPHONE HYDROCHLORIDE 0.2 MG: 1 INJECTION, SOLUTION INTRAMUSCULAR; INTRAVENOUS; SUBCUTANEOUS at 10:04

## 2017-04-15 RX ADMIN — INSULIN GLARGINE 5 UNITS: 300 INJECTION, SOLUTION SUBCUTANEOUS at 09:04

## 2017-04-15 RX ADMIN — PROPOFOL 30 MG: 10 INJECTION, EMULSION INTRAVENOUS at 09:04

## 2017-04-15 RX ADMIN — FENTANYL CITRATE 50 MCG: 50 INJECTION, SOLUTION INTRAMUSCULAR; INTRAVENOUS at 10:04

## 2017-04-15 RX ADMIN — VANCOMYCIN HYDROCHLORIDE 750 MG: 1 INJECTION, POWDER, LYOPHILIZED, FOR SOLUTION INTRAVENOUS at 03:04

## 2017-04-15 RX ADMIN — HYDROMORPHONE HYDROCHLORIDE: 1 INJECTION, SOLUTION INTRAMUSCULAR; INTRAVENOUS; SUBCUTANEOUS at 12:04

## 2017-04-15 RX ADMIN — PROPOFOL 50 MG: 10 INJECTION, EMULSION INTRAVENOUS at 10:04

## 2017-04-15 RX ADMIN — ONDANSETRON 4 MG: 2 INJECTION INTRAMUSCULAR; INTRAVENOUS at 09:04

## 2017-04-15 RX ADMIN — DIPHENHYDRAMINE HYDROCHLORIDE 50 MG: 50 INJECTION, SOLUTION INTRAMUSCULAR; INTRAVENOUS at 05:04

## 2017-04-15 RX ADMIN — HYDROMORPHONE HYDROCHLORIDE 1.5 MG: 1 INJECTION, SOLUTION INTRAMUSCULAR; INTRAVENOUS; SUBCUTANEOUS at 03:04

## 2017-04-15 RX ADMIN — CEFEPIME HYDROCHLORIDE 2 G: 2 INJECTION, SOLUTION INTRAVENOUS at 02:04

## 2017-04-15 RX ADMIN — SODIUM CHLORIDE, SODIUM GLUCONATE, SODIUM ACETATE, POTASSIUM CHLORIDE, MAGNESIUM CHLORIDE, SODIUM PHOSPHATE, DIBASIC, AND POTASSIUM PHOSPHATE: .53; .5; .37; .037; .03; .012; .00082 INJECTION, SOLUTION INTRAVENOUS at 09:04

## 2017-04-15 RX ADMIN — HYDROMORPHONE HYDROCHLORIDE 1.5 MG: 1 INJECTION, SOLUTION INTRAMUSCULAR; INTRAVENOUS; SUBCUTANEOUS at 06:04

## 2017-04-15 RX ADMIN — DAPSONE 100 MG: 100 TABLET ORAL at 08:04

## 2017-04-15 RX ADMIN — DIPHENHYDRAMINE HYDROCHLORIDE 50 MG: 50 INJECTION, SOLUTION INTRAMUSCULAR; INTRAVENOUS at 10:04

## 2017-04-15 RX ADMIN — LORAZEPAM 2 MG: 1 TABLET ORAL at 11:04

## 2017-04-15 RX ADMIN — MIDAZOLAM HYDROCHLORIDE 2 MG: 1 INJECTION, SOLUTION INTRAMUSCULAR; INTRAVENOUS at 09:04

## 2017-04-15 RX ADMIN — DIPHENHYDRAMINE HYDROCHLORIDE 50 MG: 50 INJECTION, SOLUTION INTRAMUSCULAR; INTRAVENOUS at 01:04

## 2017-04-15 RX ADMIN — DEXAMETHASONE SODIUM PHOSPHATE 8 MG: 4 INJECTION, SOLUTION INTRAMUSCULAR; INTRAVENOUS at 06:04

## 2017-04-15 RX ADMIN — HYDROMORPHONE HYDROCHLORIDE 1.5 MG: 1 INJECTION, SOLUTION INTRAMUSCULAR; INTRAVENOUS; SUBCUTANEOUS at 12:04

## 2017-04-15 RX ADMIN — HEPARIN 500 UNITS: 100 SYRINGE at 09:04

## 2017-04-15 RX ADMIN — PANCRELIPASE 6 CAPSULE: 24000; 76000; 120000 CAPSULE, DELAYED RELEASE PELLETS ORAL at 10:04

## 2017-04-15 RX ADMIN — GABAPENTIN 300 MG: 300 CAPSULE ORAL at 01:04

## 2017-04-15 RX ADMIN — TACROLIMUS 3.5 MG: 1 CAPSULE ORAL at 05:04

## 2017-04-15 RX ADMIN — MYCOPHENOLATE MOFETIL 500 MG: 250 CAPSULE ORAL at 11:04

## 2017-04-15 RX ADMIN — ARIPIPRAZOLE 2 MG: 2 TABLET ORAL at 08:04

## 2017-04-15 RX ADMIN — INSULIN ASPART 2 UNITS: 100 INJECTION, SOLUTION INTRAVENOUS; SUBCUTANEOUS at 10:04

## 2017-04-15 RX ADMIN — VANCOMYCIN HYDROCHLORIDE 750 MG: 1 INJECTION, POWDER, LYOPHILIZED, FOR SOLUTION INTRAVENOUS at 02:04

## 2017-04-15 RX ADMIN — GABAPENTIN 300 MG: 300 CAPSULE ORAL at 11:04

## 2017-04-15 RX ADMIN — MYCOPHENOLATE MOFETIL 500 MG: 250 CAPSULE ORAL at 08:04

## 2017-04-15 RX ADMIN — DIPHENHYDRAMINE HYDROCHLORIDE 50 MG: 50 INJECTION, SOLUTION INTRAMUSCULAR; INTRAVENOUS at 02:04

## 2017-04-15 RX ADMIN — PREDNISONE 5 MG: 5 TABLET ORAL at 08:04

## 2017-04-15 RX ADMIN — PROMETHAZINE HYDROCHLORIDE 25 MG: 25 INJECTION, SOLUTION INTRAMUSCULAR; INTRAVENOUS at 10:04

## 2017-04-15 RX ADMIN — CEFEPIME HYDROCHLORIDE 2 G: 2 INJECTION, SOLUTION INTRAVENOUS at 01:04

## 2017-04-15 RX ADMIN — DIPHENHYDRAMINE HYDROCHLORIDE 50 MG: 50 INJECTION, SOLUTION INTRAMUSCULAR; INTRAVENOUS at 06:04

## 2017-04-15 RX ADMIN — OXYCODONE HYDROCHLORIDE AND ACETAMINOPHEN 1 TABLET: 10; 325 TABLET ORAL at 12:04

## 2017-04-15 RX ADMIN — HYDROMORPHONE HYDROCHLORIDE 1.5 MG: 1 INJECTION, SOLUTION INTRAMUSCULAR; INTRAVENOUS; SUBCUTANEOUS at 09:04

## 2017-04-15 NOTE — NURSING
- Pt arrived to [place] [address] at 0020 on 4/15/17 via wheelchair (brought from other hospital by vehicle transport service), accompanied by significant other.  - All belongings with pt. Pt oriented to room by this RN.  - Jake Alvarez MD already aware of pt's admission; orders already entered; eICU will be called for urgent issues overnight.  - Right chest wall port-a-cath (subclavian vein) accessed; heparin aspirated from tubing and pt connected to IV fluids (normal saline) at 5 mL/hr to prevent port-a-cath from clotting. Port-a-cath will need to be heparin locked if pt requires disconnection from IV pump.  - Pt requesting pain medication. IV Dilaudid 1.5 mg administered by this RN.  - VSS; pt in NAD.  - Was informed by pt that administration of IV Vancomycin will require pre-medication with 50 mg of IV Benadryl, as pt is sensitive to Vancomycin (hx of red man syndrome). Discussed this with pharmacist as Vancomycin is ordered for her tonight but is listed in her allergies. Will pre-medicate pt with 50 mg of IV Benadryl 30 minutes before starting Vancomycin.  - Pt verbalizes no needs at present time. Will continue to monitor.

## 2017-04-15 NOTE — CONSULTS
[facility]  Endocrinology  Consult Note    Consult Requested by: Jake Alvarez MD   Reason for admit: Mastoiditis    HISTORY OF PRESENT ILLNESS:  Reason for Consult: Management of CFRDM, Hyperglycemia     Surgical Procedure and Date: Bilateral Lung trx 6/2014, Sinus surgery 4/2017  Diabetes diagnosis year: 3/2017    Home Diabetes Medications:  Toujeo 5 units qam    How often checking glucose at home? 1-3 x day   BG readings on regimen: ~140s  Hypoglycemia on the regimen?  No  Missed doses on regimen?  No    Diabetes Complications include:     Hyperglycemia    Complicating diabetes co morbidities:   Glucocorticoid use       HPI:   Patient is a 27 y.o. female with a diagnosis of CFRDM, chronic sinusitis, Bilateral Lung transplant secondary to CF presents with chronic ethmoidal sinusitis. Endocrinology consulted for blood glucose management.  Patient recently started on Toujeo 5 units qam in 3/2017.         Medications and/or Treatments Impacting Glycemic Control:  Immunotherapy:    Immunosuppressants     Start     Stop Route Frequency Ordered    04/15/17 0030  mycophenolate capsule 500 mg      -- Oral 2 times daily 04/15/17 0024    04/15/17 0800  tacrolimus capsule 3.5 mg      -- Oral 2 times daily 04/15/17 0024        Steroids:   Hormones     Start     Stop Route Frequency Ordered    04/15/17 0600  dexamethasone injection 8 mg      -- IV Every 8 hours 04/15/17 0024    04/15/17 0900  predniSONE tablet 5 mg      -- Oral Daily 04/15/17 0024        Pressors:    Autonomic Drugs     None          Prescriptions Prior to Admission   Medication Sig Dispense Refill Last Dose    aluminum-magnesium hydroxide-simethicone (MAALOX ADVANCED) 200-200-20 mg/5 mL Susp Take 30 mLs by mouth before meals and at bedtime as needed. 148 mL 2 More than a month at Unknown time    aripiprazole (ABILIFY) 2 MG Tab Take 2 mg by mouth once daily.   4/2/2017 at 2100    azithromycin (ZITHROMAX) 500 MG tablet Take 1 tablet  (500 mg total) by mouth every Mon, Wed, Fri. 15 tablet 11 Past Week at Unknown time    butalbital-acetaminophen-caffeine -40 mg (FIORICET, ESGIC) -40 mg per tablet TAKE 1 TABLET BY MOUTH EVERY 6 HOURS AS NEEDED FOR HEADACHE 120 tablet 0 4/2/2017 at 2100    calcium-vitamin D3 500 mg(1,250mg) -200 unit per tablet Take 1 tablet by mouth 2 (two) times daily with meals. 60 tablet 11 4/2/2017 at Unknown time    CREON 24,000-76,000 -120,000 unit capsule TAKE 5 CAPSULES BY MOUTH WITH MEALS AND 4 CAPSULES WITH SNACKS EVERYDAY (Patient taking differently: TAKE 6 CAPSULES BY MOUTH WITH MEALS AND 5 CAPSULES WITH SNACKS EVERYDAY) 810 capsule 0 4/2/2017 at Unknown time    dapsone 100 MG Tab TAKE 1 TABLET BY MOUTH EVERY DAY 30 tablet 11 4/3/2017 at 0900    diphenhydrAMINE (BENADRYL) 50 MG tablet Take 1 tablet (50 mg total) by mouth every 6 (six) hours as needed for Itching. 1 tablet 0 4/2/2017 at 2100    fluticasone (FLONASE) 50 mcg/actuation nasal spray INSERT 2 SPRAY IN EACH NOSTRIL DAILY 16 g 5 4/2/2017 at 2100    folic acid (FOLVITE) 1 MG tablet TAKE 1 TABLET BY MOUTH EVERY DAY 30 tablet 11 4/2/2017 at 0800    gabapentin (NEURONTIN) 300 MG capsule Take 1 capsule (300 mg total) by mouth 3 (three) times daily. 90 capsule 11 4/3/2017 at 0800    insulin glargine, TOUJEO, (TOUJEO) 300 unit/mL (1.5 mL) InPn pen Inject 5 Units into the skin once daily.   4/2/2017 at 0815    levalbuterol (XOPENEX HFA) 45 mcg/actuation inhaler INHALE 1 TO 2 PUFFS INTO THE LUNGS EVERY 6 HOURS AS NEEDED FOR WHEEZING OR SHORTNESS OF BREATH. USE WITH SPACER. 15 g 0 4/3/2017 at 1100    lorazepam (ATIVAN) 2 MG Tab Take 1 tablet (2 mg total) by mouth every 12 (twelve) hours as needed (for anxiety). 60 tablet 3 3/30/2017 at Unknown time    magnesium oxide (MAG-OX) 400 mg tablet Take 1 tablet (400 mg total) by mouth 2 (two) times daily. 60 tablet 11 4/2/2017 at 2100    mirtazapine (REMERON) 15 MG tablet Take 30 mg by mouth every  evening. rx'd by PCP    4/2/2017 at 2100    mometasone-formoterol 100-5 mcg/actuation HFAA Inhale 1 puff into the lungs 2 (two) times daily.   3/31/2017 at Unknown time    montelukast (SINGULAIR) 10 mg tablet TAKE 1 TABLET BY MOUTH EVERY DAY 30 tablet 11 4/3/2017 at 0800    morphine (MS CONTIN) 15 MG 12 hr tablet Take 15 mg by mouth 2 (two) times daily.   4/3/2017 at 0800    mv. min cmb#[id number] (AQUADEKS) 100-700-10 mcg-mcg-mg Cap cap Take 1 capsule by mouth 2 (two) times daily. 60 capsule 11     mycophenolate (CELLCEPT) 250 mg Cap Take 2 capsules (500 mg total) by mouth 2 (two) times daily. 120 capsule 11 4/3/2017 at 0800    omeprazole (PRILOSEC) 40 MG capsule TAKE 1 CAPSULE BY MOUTH EVERY MORNING 30 capsule 11 4/3/2017 at 0800    ondansetron (ZOFRAN) 8 MG tablet Take 1 tablet (8 mg total) by mouth every 8 (eight) hours as needed. 30 tablet 1 Past Week at Unknown time    oxycodone (OXY-IR) 5 mg Cap 1 capsule (5mg) for pain scale 1-3  2 capsule (10mg) for pain scale 4-6  3 capsule (15 mg) for pain scale 7-10  Every four hours as needed for post-operative pain. 60 capsule 0     polyethylene glycol (GLYCOLAX) 17 gram PwPk Take 17 g by mouth 2 (two) times daily. 60 packet 11 Past Month at Unknown time    predniSONE (DELTASONE) 10 MG tablet Take 4 tablets once daily for 5 days, then:  Take 3 tablets once daily for 2 days  Take 2 tablets once daily for 2 days  Take 1 tablet once daily for 2 days 32 tablet 0     predniSONE (DELTASONE) 5 MG tablet Take 5 mg by mouth once daily.   4/3/2017 at 0800    [facility] AS [facility] 1 Device 0 Taking    promethazine (PHENERGAN) 25 MG tablet TAKE 1 TABLET BY MOUTH EVERY 8 HOURS AS NEEDED FOR NAUSEA 45 tablet 0 Past Month at Unknown time    promethazine (PHENERGAN) 25 MG tablet TAKE 1 TABLET BY MOUTH EVERY 8 HOURS AS NEEDED FOR NAUSEA 45 tablet 1     tacrolimus (PROGRAF) 0.5 MG Cap Take 1 capsule (0.5 mg total) by mouth every 12 (twelve) hours. 60 capsule 11  4/3/2017 at 0800    tacrolimus (PROGRAF) 1 MG Cap Take 3 capsules (3 mg total) by mouth every 12 (twelve) hours. Daily doses: 3.5 mg every 12 hours. 180 capsule 11 4/3/2017 at 0800    tizanidine (ZANAFLEX) 4 MG tablet TAKE 2 TABLETS BY MOUTH EVERY 6 HOURS AS NEEDED 90 tablet 0 4/2/2017 at Unknown time       Current Facility-Administered Medications   Medication Dose Route Frequency Provider Last Rate Last Dose    acetaminophen tablet 650 mg  650 mg Oral Q6H PRN Jake Alvarez MD        aluminum-magnesium hydroxide-simethicone 200-200-20 mg/5 mL suspension 30 mL  30 mL Oral QID (AC + HS) PRN Jake Alvarez MD        aripiprazole tablet 2 mg  2 mg Oral Daily Jake Alvarez MD   2 mg at 04/15/17 0854    [START ON 4/17/2017] azithromycin tablet 500 mg  500 mg Oral Every Mon, Wed, Fri Jake Alvarez MD        butalbital-acetaminophen-caffeine -40 mg per tablet 1 tablet  1 tablet Oral Q6H PRN Jake Alvarez MD        ceFEPIme in dextrose 5% 2 gram/50 mL IVPB 2 g  2 g Intravenous Q12H Jake Alvarez  mL/hr at 04/15/17 0204 2 g at 04/15/17 0204    dapsone tablet 100 mg  100 mg Oral Daily Jake Alvarez MD   100 mg at 04/15/17 0854    dexamethasone injection 8 mg  8 mg Intravenous Q8H Jake Alvarez MD   8 mg at 04/15/17 0620    dextrose 50% injection 12.5 g  12.5 g Intravenous PRN Harleen Riley MD        dextrose 50% injection 25 g  25 g Intravenous PRN Harleen Riley MD        diphenhydrAMINE injection 50 mg  50 mg Intravenous Q4H PRN Jake Alvarez MD   50 mg at 04/15/17 0625    fluticasone 50 mcg/actuation nasal spray 2 spray  2 spray Each Nare Daily Jake Alvarez MD        gabapentin capsule 300 mg  300 mg Oral TID Jake Alvarez MD   300 mg at 04/15/17 0620    glucagon (human recombinant) injection 1 mg  1 mg Intramuscular PRN Harleen Riley MD        glucose chewable tablet 16 g  16 g Oral PRN Harleen Riley MD        glucose  chewable tablet 24 g  24 g Oral PRN Harleen Riley MD        heparin, porcine (PF) 100 unit/mL injection flush 500 Units  5 mL Intravenous PRN Jake Alvarez MD   500 Units at 04/15/17 0937    HYDROmorphone injection 1.5 mg  1.5 mg Intravenous Q3H PRN Jake Alvarez MD   1.5 mg at 04/15/17 0937    insulin aspart pen 0-5 Units  0-5 Units Subcutaneous QID (AC + HS) PRN Harleen Riley MD        insulin aspart pen 2 Units  2 Units Subcutaneous TIDWM Harleen Riley MD        [START ON 4/16/2017] insulin detemir pen 5 Units  5 Units Subcutaneous Daily Harleen Riley MD        levalbuterol nebulizer solution 1.25 mg  1.25 mg Nebulization Q8H PRN Jake Alvarez MD        lipase-protease-amylase 24,000-76,000-120,000 units capsule 6 capsule  6 capsule Oral TID WM PRN Jake Alvarez MD        lorazepam tablet 2 mg  2 mg Oral Q12H PRN Sukhwinder Otero MD        mirtazapine tablet 30 mg  30 mg Oral QHS Jake Alvarez MD        mycophenolate capsule 500 mg  500 mg Oral BID Jake Alvarez MD   500 mg at 04/15/17 0853    ondansetron injection 4 mg  4 mg Intravenous Daily PRN Jake Alvarez MD        oxycodone-acetaminophen  mg per tablet 1 tablet  1 tablet Oral Q4H PRN Jake Alvarez MD        pantoprazole EC tablet 40 mg  40 mg Oral Daily Jake Alvarez MD        polyethylene glycol packet 17 g  17 g Oral BID Jake Alvarez MD        potassium chloride 10% solution 40 mEq  40 mEq Oral PRN Jake Alvarez MD        And    potassium chloride 10% solution 40 mEq  40 mEq Oral PRN Jake Alvarez MD        And    potassium chloride 10% solution 60 mEq  60 mEq Oral PRN Jake Alvarez MD        predniSONE tablet 5 mg  5 mg Oral Daily Jake Alvarez MD   5 mg at 04/15/17 0854    promethazine (PHENERGAN) 25 mg in dextrose 5 % 50 mL IVPB  25 mg Intravenous Q6H PRN Jake Alvarez MD        sodium chloride 0.9% flush 3 mL  3 mL Intravenous PRN  Jake Alvarez MD        tacrolimus capsule 3.5 mg  3.5 mg Oral BID Jake Alvarez MD   3.5 mg at 04/15/17 0850    tizanidine tablet 8 mg  8 mg Oral Q6H PRN Jake Alvarez MD        vancomycin (VANCOCIN) 750 mg in dextrose 5 % 250 mL IVPB  750 mg Intravenous Q12H Jake Alvarez .7 mL/hr at 04/15/17 0310 750 mg at 04/15/17 0310       Interval HPI:       PMH, PSH, FH, SH updated and reviewed       Review of Systems   Constitutional: Negative for unexpected weight change.   HENT: Positive for ear pain.    Eyes: Negative for visual disturbance.   Respiratory: Negative for shortness of breath.    Cardiovascular: Negative for chest pain.   Gastrointestinal: Negative for abdominal pain.   Musculoskeletal: Negative for arthralgias.   Skin: Negative for wound.   Neurological: Negative for headaches.   Hematological: Does not bruise/bleed easily.   Psychiatric/Behavioral: Negative for sleep disturbance.         PHYSICAL EXAMINATION:  Vitals:    04/15/17 0739   BP: (!) 158/101   Pulse: (!) 114   Resp: 20   Temp: 98 °F (36.7 °C)     Body mass index is 21.32 kg/(m^2).    Physical Exam   Constitutional: She appears well-developed.   HENT:   Right Ear: External ear normal.   Left Ear: External ear normal.   Nose: Nose normal.   Hearing normal  Dentition good    Neck: No tracheal deviation present. No thyromegaly present.   Cardiovascular: Normal rate.    No murmur heard.  Pulmonary/Chest: Effort normal and breath sounds normal.   Abdominal: Soft. There is no tenderness. No hernia.   Musculoskeletal: She exhibits no edema.        Right foot: There is no deformity.        Left foot: There is no deformity.   Gait normal  No clubbing or cyanosis noted   Feet:   Right Foot:   Skin Integrity: Negative for ulcer.   Left Foot:   Skin Integrity: Negative for ulcer.   Neurological: No cranial nerve deficit.   Feet -sensation intact to vibration and monofilament     Skin: No rash noted.   No nodules  injection sites  are ok. No lipo hypertropthy or atrophy     Psychiatric: She has a normal mood and affect. Judgment normal.   Vitals reviewed.    .     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

## 2017-04-15 NOTE — ASSESSMENT & PLAN NOTE
BG goal 140-180 while hospitalized  Start Levemir 5 units qam, aspart 2 units + low dose correction with meals  Ac/hs  Expect blood glucose to rise with dexamethasone use

## 2017-04-15 NOTE — SUBJECTIVE & OBJECTIVE
Interval HPI:       PMH, PSH, FH, SH updated and reviewed       Review of Systems   Constitutional: Negative for unexpected weight change.   HENT: Positive for ear pain.    Eyes: Negative for visual disturbance.   Respiratory: Negative for shortness of breath.    Cardiovascular: Negative for chest pain.   Gastrointestinal: Negative for abdominal pain.   Musculoskeletal: Negative for arthralgias.   Skin: Negative for wound.   Neurological: Negative for headaches.   Hematological: Does not bruise/bleed easily.   Psychiatric/Behavioral: Negative for sleep disturbance.         PHYSICAL EXAMINATION:  Vitals:    04/15/17 0739   BP: (!) 158/101   Pulse: (!) 114   Resp: 20   Temp: 98 °F (36.7 °C)     Body mass index is 21.32 kg/(m^2).    Physical Exam   Constitutional: She appears well-developed.   HENT:   Right Ear: External ear normal.   Left Ear: External ear normal.   Nose: Nose normal.   Hearing normal  Dentition good    Neck: No tracheal deviation present. No thyromegaly present.   Cardiovascular: Normal rate.    No murmur heard.  Pulmonary/Chest: Effort normal and breath sounds normal.   Abdominal: Soft. There is no tenderness. No hernia.   Musculoskeletal: She exhibits no edema.        Right foot: There is no deformity.        Left foot: There is no deformity.   Gait normal  No clubbing or cyanosis noted   Feet:   Right Foot:   Skin Integrity: Negative for ulcer.   Left Foot:   Skin Integrity: Negative for ulcer.   Neurological: No cranial nerve deficit.   Feet -sensation intact to vibration and monofilament     Skin: No rash noted.   No nodules  injection sites are ok. No lipo hypertropthy or atrophy     Psychiatric: She has a normal mood and affect. Judgment normal.   Vitals reviewed.

## 2017-04-15 NOTE — IP AVS SNAPSHOT
[facility]  [address]  [facility] [address]  Phone: [phone]           Patient Discharge Instructions   Our goal is to set you up for success. This packet includes information on your condition, medications, and your home care.  It will help you care for yourself to prevent having to return to the hospital.     Please ask your nurse if you have any questions.      There are many details to remember when preparing to leave the hospital. Here is what you will need to do:    1. Take your medicine. If you are prescribed medications, review your Medication List on the following pages. You may have new medications to  at the pharmacy and others that you'll need to stop taking. Review the instructions for how and when to take your medications. Talk with your doctor or nurses if you are unsure of what to do.     2. Go to your follow-up appointments. Specific follow-up information is listed in the following pages. Your may be contacted by a nurse or clinical provider about future appointments. Be sure we have all of the phone numbers to reach you. Please contact your provider's office if you are unable to make an appointment.     3. Watch for warning signs. Your doctor or nurse will give you detailed warning signs to watch for and when to call for assistance. These instructions may also include educational information about your condition. If you experience any of warning signs to your health, call your doctor.           [facility]  Unless otherwise directed by your provider, please   contact [facility], our nurse care line   that is available for 24/7 assistance.     [phone] (toll-free)     Registered nurses in the [facility]   provide: appointment scheduling, clinical advisement, health education, and other advisory services.                  ** Verify the list of medication(s) below is accurate and up to date. Carry this with you in case of  emergency. If your medications have changed, please notify your healthcare provider.             Medication List      START taking these medications        Additional Info                      ceFEPIme in dextrose 5% 2 gram/50 mL Pgbk   Commonly known as:  MAXIPIME   Quantity:  1 each   Refills:  0   Dose:  2 g   Indications:  mastoiditis    Last time this was given:  2 g on 4/18/2017  9:25 AM   Instructions:  Inject 50 mLs (2 g total) into the vein every 8 (eight) hours.     Begin Date    AM    Noon    PM    Bedtime       ciprofloxacin-dexamethasone 0.3-0.1% 0.3-0.1 % Drps   Commonly known as:  CIPRODEX   Quantity:  7.5 mL   Refills:  0   Dose:  4 drop    Last time this was given:  4 drops on 4/18/2017  8:26 AM   Instructions:  Place 4 drops into the right ear 2 (two) times daily.     Begin Date    AM    Noon    PM    Bedtime       linezolid 600 mg Tab   Commonly known as:  ZYVOX   Quantity:  28 tablet   Refills:  0   Dose:  600 mg    Instructions:  Take 1 tablet (600 mg total) by mouth every 12 (twelve) hours.     Begin Date    AM    Noon    PM    Bedtime         CHANGE how you take these medications        Additional Info                      CREON 24,000-76,000 -120,000 unit capsule   Quantity:  810 capsule   Refills:  0   What changed:  See the new instructions.   Generic drug:  lipase-protease-amylase 24,000-76,000-120,000 units    Last time this was given:  6 capsules on 4/16/2017  8:38 PM   Instructions:  TAKE 5 CAPSULES BY MOUTH WITH MEALS AND 4 CAPSULES WITH SNACKS EVERYDAY     Begin Date    AM    Noon    PM    Bedtime         CONTINUE taking these medications        Additional Info                      aluminum-magnesium hydroxide-simethicone 200-200-20 mg/5 mL Susp   Commonly known as:  MAALOX ADVANCED   Quantity:  148 mL   Refills:  2   Dose:  30 mL    Instructions:  Take 30 mLs by mouth before meals and at bedtime as needed.     Begin Date    AM    Noon    PM    Bedtime       aripiprazole 2 MG Tab    Commonly known as:  ABILIFY   Refills:  0   Dose:  2 mg    Last time this was given:  2 mg on 4/18/2017  8:22 AM   Instructions:  Take 2 mg by mouth once daily.     Begin Date    AM    Noon    PM    Bedtime       azithromycin 500 MG tablet   Commonly known as:  ZITHROMAX   Quantity:  15 tablet   Refills:  11   Dose:  500 mg    Last time this was given:  500 mg on 4/17/2017  5:36 PM   Instructions:  Take 1 tablet (500 mg total) by mouth every Mon, Wed, Fri.     Begin Date    AM    Noon    PM    Bedtime       butalbital-acetaminophen-caffeine -40 mg -40 mg per tablet   Commonly known as:  FIORICET, ESGIC   Quantity:  120 tablet   Refills:  0    Instructions:  TAKE 1 TABLET BY MOUTH EVERY 6 HOURS AS NEEDED FOR HEADACHE     Begin Date    AM    Noon    PM    Bedtime       calcium-vitamin D3 500 mg(1,250mg) -200 unit per tablet   Quantity:  60 tablet   Refills:  11   Dose:  1 tablet    Instructions:  Take 1 tablet by mouth 2 (two) times daily with meals.     Begin Date    AM    Noon    PM    Bedtime       dapsone 100 MG Tab   Quantity:  30 tablet   Refills:  11    Last time this was given:  100 mg on 4/18/2017  8:22 AM   Instructions:  TAKE 1 TABLET BY MOUTH EVERY DAY     Begin Date    AM    Noon    PM    Bedtime       diphenhydrAMINE 50 MG tablet   Commonly known as:  BENADRYL   Quantity:  1 tablet   Refills:  0   Dose:  50 mg    Instructions:  Take 1 tablet (50 mg total) by mouth every 6 (six) hours as needed for Itching.     Begin Date    AM    Noon    PM    Bedtime       fluticasone 50 mcg/actuation nasal spray   Commonly known as:  FLONASE   Quantity:  16 g   Refills:  5    Last time this was given:  2 sprays on 4/17/2017  9:28 AM   Instructions:  INSERT 2 SPRAY IN EACH NOSTRIL DAILY     Begin Date    AM    Noon    PM    Bedtime       folic acid 1 MG tablet   Commonly known as:  FOLVITE   Quantity:  30 tablet   Refills:  11    Instructions:  TAKE 1 TABLET BY MOUTH EVERY DAY     Begin Date    AM    Noon     PM    Bedtime       gabapentin 300 MG capsule   Commonly known as:  NEURONTIN   Quantity:  90 capsule   Refills:  11   Dose:  300 mg    Last time this was given:  300 mg on 4/18/2017  5:36 AM   Instructions:  Take 1 capsule (300 mg total) by mouth 3 (three) times daily.     Begin Date    AM    Noon    PM    Bedtime       insulin glargine (TOUJEO) 300 unit/mL (1.5 mL) Inpn pen   Commonly known as:  TOUJEO   Refills:  0   Dose:  5 Units    Last time this was given:  5 Units on 4/15/2017  9:00 AM   Instructions:  Inject 5 Units into the skin once daily.     Begin Date    AM    Noon    PM    Bedtime       levalbuterol 45 mcg/actuation inhaler   Commonly known as:  XOPENEX HFA   Quantity:  15 g   Refills:  0    Instructions:  INHALE 1 TO 2 PUFFS INTO THE LUNGS EVERY 6 HOURS AS NEEDED FOR WHEEZING OR SHORTNESS OF BREATH. USE WITH SPACER.     Begin Date    AM    Noon    PM    Bedtime       lorazepam 2 MG Tab   Commonly known as:  ATIVAN   Quantity:  60 tablet   Refills:  3   Dose:  2 mg    Last time this was given:  2 mg on 4/18/2017  9:25 AM   Instructions:  Take 1 tablet (2 mg total) by mouth every 12 (twelve) hours as needed (for anxiety).     Begin Date    AM    Noon    PM    Bedtime       magnesium oxide 400 mg tablet   Commonly known as:  MAG-OX   Quantity:  60 tablet   Refills:  11    Instructions:  Take 1 tablet (400 mg total) by mouth 2 (two) times daily.     Begin Date    AM    Noon    PM    Bedtime       mirtazapine 15 MG tablet   Commonly known as:  REMERON   Refills:  0   Dose:  30 mg    Last time this was given:  30 mg on 4/17/2017  9:32 PM   Instructions:  Take 30 mg by mouth every evening. rx'd by PCP     Begin Date    AM    Noon    PM    Bedtime       mometasone-formoterol 100-5 mcg/actuation Hfaa   Refills:  0   Dose:  1 puff    Instructions:  Inhale 1 puff into the lungs 2 (two) times daily.     Begin Date    AM    Noon    PM    Bedtime       montelukast 10 mg tablet   Commonly known as:  SINGULAIR    Quantity:  30 tablet   Refills:  11    Instructions:  TAKE 1 TABLET BY MOUTH EVERY DAY     Begin Date    AM    Noon    PM    Bedtime       morphine 15 MG 12 hr tablet   Commonly known as:  MS CONTIN   Refills:  0   Dose:  15 mg    Instructions:  Take 15 mg by mouth 2 (two) times daily.     Begin Date    AM    Noon    PM    Bedtime       mv. min cmb#[id number] 100-700-10 mcg-mcg-mg Cap cap   Commonly known as:  AQUADEKS   Quantity:  60 capsule   Refills:  11   Dose:  1 capsule    Instructions:  Take 1 capsule by mouth 2 (two) times daily.     Begin Date    AM    Noon    PM    Bedtime       mycophenolate 250 mg Cap   Commonly known as:  CELLCEPT   Quantity:  120 capsule   Refills:  11   Dose:  500 mg    Last time this was given:  500 mg on 4/18/2017  8:23 AM   Instructions:  Take 2 capsules (500 mg total) by mouth 2 (two) times daily.     Begin Date    AM    Noon    PM    Bedtime       omeprazole 40 MG capsule   Commonly known as:  PRILOSEC   Quantity:  30 capsule   Refills:  11    Instructions:  TAKE 1 CAPSULE BY MOUTH EVERY MORNING     Begin Date    AM    Noon    PM    Bedtime       ondansetron 8 MG tablet   Commonly known as:  ZOFRAN   Quantity:  30 tablet   Refills:  1   Dose:  8 mg    Instructions:  Take 1 tablet (8 mg total) by mouth every 8 (eight) hours as needed.     Begin Date    AM    Noon    PM    Bedtime       oxycodone 5 mg Cap   Commonly known as:  OXY-IR   Quantity:  60 capsule   Refills:  0    Instructions:  1 capsule (5mg) for pain scale 1-3 2 capsule (10mg) for pain scale 4-6 3 capsule (15 mg) for pain scale 7-10 Every four hours as needed for post-operative pain.     Begin Date    AM    Noon    PM    Bedtime       polyethylene glycol 17 gram Pwpk   Commonly known as:  GLYCOLAX   Quantity:  60 packet   Refills:  11   Dose:  17 g    Instructions:  Take 17 g by mouth 2 (two) times daily.     Begin Date    AM    Noon    PM    Bedtime       predniSONE 5 MG tablet   Commonly known as:  DELTASONE    Refills:  0   Dose:  5 mg    Last time this was given:  5 mg on 4/18/2017  8:23 AM   Instructions:  Take 5 mg by mouth once daily.     Begin Date    AM    Noon    PM    Bedtime       PROCHAMBER   Quantity:  1 Device   Refills:  0   Generic drug:  inhalation spacing device    Instructions:  USE AS DIRECTED     Begin Date    AM    Noon    PM    Bedtime       promethazine 25 MG tablet   Commonly known as:  PHENERGAN   Quantity:  45 tablet   Refills:  0    Instructions:  TAKE 1 TABLET BY MOUTH EVERY 8 HOURS AS NEEDED FOR NAUSEA     Begin Date    AM    Noon    PM    Bedtime       * tacrolimus 1 MG Cap   Commonly known as:  PROGRAF   Quantity:  180 capsule   Refills:  11   Dose:  3 mg    Last time this was given:  3.5 mg on 4/18/2017  7:45 AM   Instructions:  Take 3 capsules (3 mg total) by mouth every 12 (twelve) hours. Daily doses: 3.5 mg every 12 hours.     Begin Date    AM    Noon    PM    Bedtime       * tacrolimus 0.5 MG Cap   Commonly known as:  PROGRAF   Quantity:  60 capsule   Refills:  11   Dose:  0.5 mg    Last time this was given:  3.5 mg on 4/18/2017  7:45 AM   Instructions:  Take 1 capsule (0.5 mg total) by mouth every 12 (twelve) hours.     Begin Date    AM    Noon    PM    Bedtime       tizanidine 4 MG tablet   Commonly known as:  ZANAFLEX   Quantity:  90 tablet   Refills:  0    Instructions:  TAKE 2 TABLETS BY MOUTH EVERY 6 HOURS AS NEEDED     Begin Date    AM    Noon    PM    Bedtime       * Notice:  This list has 2 medication(s) that are the same as other medications prescribed for you. Read the directions carefully, and ask your doctor or other care provider to review them with you.         Where to Get Your Medications      These medications were sent to [facility] [id number] [place] [address] - [address] AT [facility] [place] [id number] [facility] [place] [address]     Phone:  [phone]     ciprofloxacin-dexamethasone 0.3-0.1% 0.3-0.1 % Drps    linezolid 600 mg Tab          Information about where to get these medications is not yet available     ! Ask your nurse or doctor about these medications     ceFEPIme in dextrose 5% 2 gram/50 mL Pgbk                  Please bring to all follow up appointments:    1. A copy of your discharge instructions.  2. All medicines you are currently taking in their original bottles.  3. Identification and insurance card.    Please arrive 15 minutes ahead of scheduled appointment time.    Please call 24 hours in advance if you must reschedule your appointment and/or time.        Your Scheduled Appointments     Apr 20, 2017  1:45 PM CDT   Post OP with Cesar Olsen MD   [facility] - Otorhinolaryngology ([facility] )    [address]  [place] [id number] [phone]              Your Future Surgeries/Procedures     Apr 25, 2017   Surgery with Deny Dias Jr., MD   [facility] ([facility])    [address]  [facility] [address] [phone]              Follow-up Information     Follow up with Jake Alvarez MD.    Specialties:  Intensive Care, Transplant    Contact information:    [address] [facility] [address]  [phone]          Follow up with [facility] [place] .    Specialties:  Pharmacist, DME Provider, IV Infusion    Contact information:    [address] [place] [address]  [phone]        Referrals     Future Orders    Ambulatory Referral to Infusion Dept     Questions:    Staff name & direct extension:  Zuleima Rahman [phone]        Discharge Instructions     Future Orders    Activity as tolerated     Call MD for:  difficulty breathing or increased cough     Call MD for:  increased confusion or weakness     Call MD for:  persistent dizziness, light-headedness, or visual disturbances     Call MD for:  persistent nausea and vomiting or diarrhea     Call MD for:  redness, tenderness, or signs of infection (pain, swelling, redness,  "odor or green/yellow discharge around incision site)     Call MD for:  severe persistent headache     Call MD for:  severe uncontrolled pain     Call MD for:  temperature >100.4     Call MD for:  worsening rash     Diet general     Questions:    Total calories:      Fat restriction, if any:      Protein restriction, if any:      Na restriction, if any:      Fluid restriction:      Additional restrictions:          Primary Diagnosis     Your primary diagnosis was:  Inflammation Of The Mastoid Bone      Admission Information     Date & Time Provider Department CSN    4/15/2017 12:22 AM Jake Alvarez MD [facility] [id number]      Care Providers     Provider Role Specialty Primary office phone    Jake Alvarez MD Attending Provider Intensive Care [phone]    Edy Mccarry II, MD Consulting Physician  Endocrinology [phone]    Jose Sagastume MD Consulting Physician  Endocrinology [phone]    Payton Henry MD Consulting Physician  Endocrinology [phone]    Hollie Cobb MD Consulting Physician  Endocrinology [phone]    Soraya Gaxiola MD (Inactive) Consulting Physician  Endocrinology [phone]    Kari Dutton MD Consulting Physician  Endocrinology [phone]    Gary Null MD Consulting Physician  Otolaryngology [phone]    Gary Null MD Surgeon  Otolaryngology [phone]      Your Vitals Were     BP Pulse Temp Resp Height Weight    143/97 (BP Location: Left arm, Patient Position: Sitting, BP Method: Automatic) 106 98.4 °F (36.9 °C) (Oral) 18 5' 0.5" (1.537 m) 50.8 kg (112 lb)    SpO2 BMI             96% 21.51 kg/m2         Recent Lab Values        6/29/2014 8/11/2014 5/19/2016                     4:30 AM  3:09 PM  8:47 AM         A1C 4.0 (L) 4.6 4.2 (L)                   Pending Labs     Order Current Status    Fungus culture In process    Tacrolimus level In process    AFB Culture & Smear Preliminary result    Aerobic " culture Preliminary result    Culture, Anaerobe Preliminary result      Allergies as of 4/18/2017        Reactions    Colistin Anaphylaxis    Vancomycin Analogues     Infusion reaction that does not resolve with slowing    Neupogen [Filgrastim] Other (See Comments)    Ostealgia after five daily doses of 300 mcg.      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  Unwilling to retry therapy.      Advance Directives     An advance directive is a document which, in the event you are no longer able to make decisions for yourself, tells your healthcare team what kind of treatment you do or do not want to receive, or who you would like to make those decisions for you.  If you do not currently have an advance directive, [facility] encourages you to create one.  For more information call:  [phone]), [phone] ([phone]),  or log on to [email or web address].        Language Assistance Services     ATTENTION: Language assistance services are available, free of charge. Please call [phone].      ATENCIÓN: Si habla español, tiene a odom disposición servicios gratuitos de asistencia lingüística. Llame al [phone].     CHÚ Ý: N?u b?n nói Ti?ng Vi?t, có các d?ch v? h? tr? ngôn ng? mi?n phí dành cho b?n. G?i s? [phone].        Pneumonmia Discharge Instructions                Diabetes Discharge Instructions                                    [facility] complies with applicable Federal civil rights laws and  does not discriminate on the basis of race, color, national origin, age, disability, or sex.

## 2017-04-15 NOTE — PLAN OF CARE
Problem: Patient Care Overview  Goal: Plan of Care Review  Outcome: Ongoing (interventions implemented as appropriate)  Pt aao x 4. Patient free from falls this shift, wearing non-skid footwear when ambulating. Patient's Port re-accessed with new needle today. Patient receiving MTI of 2 units and coverage for BG; patient ACHS. Patient has had Ativan 2 mg PO, and IV dilaudid 1.5 mg. Patient allowed to have IV dilaudid 1.5 mg q 5 hours per MD order, patient has also received IV benadryl. ENT consulted, patient to have surgery later this evening. Patient NPO since 10 this morning. Patient currently in CT for temporal bone scan with contrast. Patient has received IV bolus of 500 cc. Patient's dexamethasone d/c. Patient to begin receiving Levemir 5 units tomorrow morning.      Will continue to monitor.

## 2017-04-15 NOTE — H&P
CORNELL Grant is a 27 year old woman with a hx of CF s/p bilateral sequential lung in 6/12/14 (complicated by A1 rejection, CMV viremia, and grade 3 CARLOS EDUARDO) who was transferred from an OSH.  ~2 weeks ago she underwent endoscopic sinus surgery with ENT.  Surgery went as planned without complications.  A few days after discharge, she developed severe right sided ear pain with facial swelling and purulent discharge. She was admitted near [place] for an acute ear infection.  Juanita states after ear drops and IV Meropenem and IV Zyvox that the discharge decreased and because more clear.  She continue to have pain and was transferred here for further care.  She denies fevers, chills, chest pain, shortness of breath, cough, sore throat, heartburn.  +Diarrhea    Review of Systems   General ROS: negative for - chills, fever or malaise  ENT ROS: negative for - nasal congestion, nasal discharge, sinus pain, sore throat or vertigo  Respiratory ROS: negative for - cough, hemoptysis, shortness of breath or tachypnea  Cardiovascular ROS: negative for - chest pain, edema, palpitations or shortness of breath  Gastrointestinal ROS: positive for - diarrhea; negative for abdominal pain, vomiting  Musculoskeletal ROS: negative for - joint pain  Neurological ROS: negative for - confusion, dizziness or visual changes  Psychological ROS: positive for - anxiety (related to Prednisone); negative for depression      Past Medical History   Diagnosis Date    Arthritis      Blood transfusion      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    Cystic fibrosis of the lung      Hypertension      Osteopenia      Other specified disease of pancreas      Pain disorder      Seizures      Sinusitis, chronic      Vocal cord paralysis 06/2014       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                Past Surgical History   Procedure Laterality Date    Sinus surgery        Intrauterine device insertion   2011    Portacath placement Right         rt sc     "Lung transplant Bilateral 6/12/14    Abdominal surgery           peg tube     Endometrial ablation        Tubal ligation        Cholecystectomy                   Family History   Problem Relation Age of Onset    Thrombocytopenia Maternal Grandfather      Drug abuse Maternal Grandfather      Breast cancer Neg Hx      Colon cancer Neg Hx      Ovarian cancer Neg Hx           Social History      Social History    Marital status: Single       Spouse name: N/A    Number of children: N/A    Years of education: N/A             Social History Main Topics    Smoking status: Never Smoker    Smokeless tobacco: Never Used    Alcohol use No         Comment: Has not had an alcoholic drink in more than 2 months.    Drug use: No    Sexual activity: Yes       Partners: Male       Birth control/ protection: Implant       Physical Exam  BP (!) 158/101 (BP Location: Left arm, Patient Position: Lying, BP Method: Automatic)  Pulse (!) 114  Temp 98 °F (36.7 °C) (Oral)   Resp 20  Ht 5' 0.5" (1.537 m)  Wt 50.3 kg (111 lb)  SpO2 95%  Breastfeeding? No  BMI 21.32 kg/m2  Gen- NAD  ENT- no sinus tenderness, no discharge  CVS- NL heart sounds  PULM- Good AE B/L, no increased work of breathing, rales or wheezes  GI- sonft. NT  EXT- no edema, NL capillary refill  NEURO- AAO x 3  PSYCH- NL affect and mood    Assessment and Plan:    Juanita is a 27 year old woman with a hx of CF s/p bilateral sequential lung in 6/12/14 (complicated by A1 rejection, CMV viremia, and grade 3 CARLOS EDUARDO) who was transferred from an OSH for suspected acute mastoiditis.    1. Cont with IV antibiotics, ENT consulted pending.  May need repeat imaging  2. Cont with immunosuppression  3. Cont with prophylactic anitbiotics      Sukhwinder Otero MD  Pulmonary and Critical Care Fellow      Attending Note:    I have seen and evaluated the patient with the fellow. Their note reflects the content of our discussion and my plan of care.    ENT consulted and she will " have tympanostomy tonight. Possible mastoid surgery during the week depending on outcome of tympanostomy.    Continue cefepime and vancomycin.      Jake Alvarez MD  Pulmonary/Critical Care Medicine

## 2017-04-15 NOTE — CONSULTS
"Otolaryngology - Head and Neck Surgery  Consultation Report    Consultation From: Transplant ICU  Chief Complaint: Hx of CF, status post sinus surgery, now transferred with right otomastoiditis.    History of Present Illness: 27 y.o. year old female presents with right ear pain of approximately one week's duration.  She reports going home after her sinus surgery and feeling fullness in the right ear which progressed to pain which subsequently worsened.  She went to an outside hospital where she was suspected of having an ear infection and had a CT scan which is unfortunately not available for review.  She states that during the CT scan her ear pain increased and she felt a pop followed by drainage of yellow fluid from the ear.  The drainage has subsequently stopped, but she continues to have pain.   She complains of pain when touching the ear and discomfort behind the ear as well.  She occasionally feels "bubbles" in the ear, but is unable to autoinsufflate.  She has been started on IV cefepime.  She does not have any history of ear infections.  From a sinus standpoint she is doing well after surgery without major complaints.    Review of Systems - Negative except as per HPI.    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    Past Surgical History: Patient has a past surgical history that includes Sinus surgery; Portacath placement (Right); Lung transplant (Bilateral, 6/12/14); Abdominal surgery; Endometrial ablation (2015); Cholecystectomy (2016); and Salpingectomy (Bilateral, 2015).    Social History: Patient reports that she has never smoked. She has never used smokeless tobacco. She reports that she does not drink alcohol or use illicit " drugs.    Family History: family history includes Drug abuse in her maternal grandfather; Thrombocytopenia in her maternal grandfather. There is no history of Breast cancer, Colon cancer, or Ovarian cancer.    Medications:    aripiprazole  2 mg Oral Daily    [START ON 4/17/2017] azithromycin  500 mg Oral Every Mon, Wed, Fri    ceFEPime (MAXIPIME) IVPB  2 g Intravenous Q12H    dapsone  100 mg Oral Daily    fluticasone  2 spray Each Nare Daily    gabapentin  300 mg Oral TID    HYDROmorphone        insulin aspart  2 Units Subcutaneous TIDWM    [START ON 4/16/2017] insulin detemir  5 Units Subcutaneous Daily    mirtazapine  30 mg Oral QHS    mycophenolate  500 mg Oral BID    pantoprazole  40 mg Oral Daily    polyethylene glycol  17 g Oral BID    predniSONE  5 mg Oral Daily    tacrolimus  3.5 mg Oral BID    vancomycin (VANCOCIN) IVPB  750 mg Intravenous Q12H       Allergies: Patient is allergic to colistin; vancomycin analogues; neupogen [filgrastim]; bactrim [sulfamethoxazole-trimethoprim]; ceftazidime (anhydrous); dronabinol; haldol [haloperidol lactate]; nsaids (non-steroidal anti-inflammatory drug); adhesive; aztreonam; and ciprofloxacin.    Physical Exam:  Temp:  [98 °F (36.7 °C)-98.6 °F (37 °C)] 98 °F (36.7 °C)  Pulse:  [103-144] 144  Resp:  [18-20] 18  SpO2:  [93 %-98 %] 93 %  BP: (120-158)/() 142/88    Constitutional: Tearful but in no acute distress and speaking clearly in full sentences.  Eyes: EOM I Bilaterally  Head/Face: Normocephalic.  Negative paranasal sinus pressure/tenderness.  Salivary glands WNL.  House Brackmann I Bilaterally.  Right Ear: External Auditory Canal WNL,TM is thickened and erythematous with opaque middle ear effusion.  I do not see a perforation and there is no fluid in the EAC. There is tenderness to tragal pressure, and there is erythema and mild tenderness of the mastoid.  Left Ear: External Auditory Canal WNL,TM w/o masses/lesions/perforations. Auricle  WNL.  Nose: No gross nasal septal deviation. Inferior Turbinates 2+ bilaterally. No septal perforation. No masses/lesions. External nasal skin without masses/lesions.  Oral Cavity: Gingiva/lips WNL.  FOM Soft, no masses palpated. Oral Tongue mobile. Hard Palate WNL.   Oropharynx: BOT WNL. No masses/lesions noted. Tonsillar fossa/pharyngeal wall without lesions. Posterior oropharynx WNL.  Soft palate without masses. Midline uvula.   Neck/Lymphatic: No LAD I-VI bilaterally.  No thyromegaly.  No masses noted on exam.  Neuro/Psychiatric: AOx3.    Cardiovascular: Normal carotid pulses bilaterally, no increasing jugular venous distention noted at cervical region bilaterally.    Respiratory: Normal respiratory effort, no stridor, no retractions noted.    CBC    Recent Labs  Lab 04/15/17  0526   WBC 7.43   HGB 9.1*   HCT 29.3*   MCV 94   *     BMP    Recent Labs  Lab 04/15/17  0526   *      K 3.9      CO2 27   BUN 15   CREATININE 0.9   CALCIUM 8.3*   MG 1.6       CT temporal bone with contrast ordered and pending    Assessment: 27 year old female with CF and hx of lung transplant, recent sinus surgery who is transferred for right otomastoiditis.       Plan:   - CT with contrast of temporal bone  - Add-on for surgery for right myringotomy with tube placement  - Patient consented and OR notified, patient last ate a full meal at noon  - Please call ENT on call with questions

## 2017-04-15 NOTE — ANESTHESIA PREPROCEDURE EVALUATION
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    Pre-operative evaluation for Procedure(s) (LRB):  MYRINGOTOMY WITH INSERTION OF PE TUBES (Right)    IV Access:  Port a cath R SC    Oxygen/Ventilatory Requirements:  93% on RA    Infusions:         Last Airway:    Present Prior to Hospital Arrival?: No; Placement Date: 04/03/17; Placement Time: 1204; Method of Intubation: Alejandre; Inserted by: CRNA; Airway Device: Endotracheal Tube; Mask Ventilation: Easy; Intubated: Postinduction; Blade: Ej #3; Airway Device Size: 5.5; Style: Cuffed; Cuff Inflation: Minimal occlusive pressure; Inflation Amount: 6; Placement Verified By: Auscultation, Capnometry; Grade: Grade I; Complicating Factors: None; Intubation Findings: Positive EtCO2, Bilateral breath sounds, Atraumatic/Condition of teeth unchanged;  Depth of Insertion: 18; Securment: Lips; Complications: None; Breath Sounds: Equal Bilateral; Insertion Attempts: 1; Removal Date: 04/03/17;  Removal Time: 1431      10.03.2016      Reassurance was provided. Adherence to Ms. Haney's swallowing strategies was recommended. Should she require general anesthesia for elective surgery, I would recommend either an LMA or a small (5.5) ETT. I am happy to discuss my recommendations with any surgeons or anesthesiology providers as necessary.     She will follow up with me in the future on an as-needed basis.     All questions were answered, and the patient is in agreement with the plan.     Gary Null M.D.  [facility]  Department of Otorhinolaryngology and Communication Sciences             Patient Active Problem List   Diagnosis    Cystic fibrosis with pulmonary manifestations    Sinusitis, chronic    Anxiety disorder    Other pain disorders related to psychological factors    Nausea    Protein calorie malnutrition    CF related Pancreatic insufficiency    Chronic pain with opiate use    Allergy to multiple antibiotics    Dysphagia, oropharyngeal    Chronic visceral pain (pleura)    Transaminitis    Gallstones    Hyperglycemia    Adverse effect of glucocorticoid or synthetic analogue    Immunosuppression    Lung transplant status, bilateral    Dysphonia    Constipation    Portacath in place    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    Prophylactic antibiotic    Complication of transplanted lung    Acute rejection of lung transplant    Debility    Muscle spasm of back    Anemia    Headache    Aftercare following organ transplant    Leukopenia    SBO (small bowel obstruction)    Sterilization    Lung replaced by transplant    Fever    Cytomegalovirus (CMV) viremia    Pneumonia    Hyperkalemia    Acute kidney injury    Other complications of lung transplant    S/P lung transplant    Bilious vomiting with nausea    Acute intractable headache    Pneumonia, organism unspecified    Lymphadenopathy    Biliary colic    Unilateral complete vocal fold paralysis    Bronchiolitis obliterans syndrome    Chronic ethmoidal sinusitis    Mastoiditis       Review of patient's allergies indicates:   Allergen Reactions    Colistin Anaphylaxis    Vancomycin analogues      Infusion reaction that does not resolve with slowing    Neupogen [filgrastim] Other (See Comments)     Ostealgia after five daily doses of 300 mcg.      Bactrim [sulfamethoxazole-trimethoprim] Hives    Ceftazidime (anhydrous) Hives     Pt stated can tolerate cefapine not ceftazidime    Dronabinol Other (See Comments)     Mental changes/hallucinations    Haldol [haloperidol lactate]  Other (See Comments)     Seizure like activity    Nsaids (non-steroidal anti-inflammatory drug)      Cannot have due to lung transplant    Adhesive Rash     Cloth tape- please use tegaderm or paper tape    Aztreonam Rash    Ciprofloxacin Nausea And Vomiting     Projectile N/V, per patient.  Unwilling to retry therapy.       No current facility-administered medications on file prior to encounter.      Current Outpatient Prescriptions on File Prior to Encounter   Medication Sig Dispense Refill    aluminum-magnesium hydroxide-simethicone (MAALOX ADVANCED) 200-200-20 mg/5 mL Susp Take 30 mLs by mouth before meals and at bedtime as needed. 148 mL 2    aripiprazole (ABILIFY) 2 MG Tab Take 2 mg by mouth once daily.      azithromycin (ZITHROMAX) 500 MG tablet Take 1 tablet (500 mg total) by mouth every Mon, Wed, Fri. 15 tablet 11    butalbital-acetaminophen-caffeine -40 mg (FIORICET, ESGIC) -40 mg per tablet TAKE 1 TABLET BY MOUTH EVERY 6 HOURS AS NEEDED FOR HEADACHE 120 tablet 0    calcium-vitamin D3 500 mg(1,250mg) -200 unit per tablet Take 1 tablet by mouth 2 (two) times daily with meals. 60 tablet 11    CREON 24,000-76,000 -120,000 unit capsule TAKE 5 CAPSULES BY MOUTH WITH MEALS AND 4 CAPSULES WITH SNACKS EVERYDAY (Patient taking differently: TAKE 6 CAPSULES BY MOUTH WITH MEALS AND 5 CAPSULES WITH SNACKS EVERYDAY) 810 capsule 0    dapsone 100 MG Tab TAKE 1 TABLET BY MOUTH EVERY DAY 30 tablet 11    diphenhydrAMINE (BENADRYL) 50 MG tablet Take 1 tablet (50 mg total) by mouth every 6 (six) hours as needed for Itching. 1 tablet 0    fluticasone (FLONASE) 50 mcg/actuation nasal spray INSERT 2 SPRAY IN EACH NOSTRIL DAILY 16 g 5    folic acid (FOLVITE) 1 MG tablet TAKE 1 TABLET BY MOUTH EVERY DAY 30 tablet 11    gabapentin (NEURONTIN) 300 MG capsule Take 1 capsule (300 mg total) by mouth 3 (three) times daily. 90 capsule 11    insulin glargine, TOUJEO, (TOUJEO) 300 unit/mL (1.5 mL) InPn pen Inject  5 Units into the skin once daily.      levalbuterol (XOPENEX HFA) 45 mcg/actuation inhaler INHALE 1 TO 2 PUFFS INTO THE LUNGS EVERY 6 HOURS AS NEEDED FOR WHEEZING OR SHORTNESS OF BREATH. USE WITH SPACER. 15 g 0    lorazepam (ATIVAN) 2 MG Tab Take 1 tablet (2 mg total) by mouth every 12 (twelve) hours as needed (for anxiety). 60 tablet 3    magnesium oxide (MAG-OX) 400 mg tablet Take 1 tablet (400 mg total) by mouth 2 (two) times daily. 60 tablet 11    mirtazapine (REMERON) 15 MG tablet Take 30 mg by mouth every evening. rx'd by PCP       mometasone-formoterol 100-5 mcg/actuation HFAA Inhale 1 puff into the lungs 2 (two) times daily.      montelukast (SINGULAIR) 10 mg tablet TAKE 1 TABLET BY MOUTH EVERY DAY 30 tablet 11    morphine (MS CONTIN) 15 MG 12 hr tablet Take 15 mg by mouth 2 (two) times daily.      mv. min cmb#[id number] (AQUADEKS) 100-700-10 mcg-mcg-mg Cap cap Take 1 capsule by mouth 2 (two) times daily. 60 capsule 11    mycophenolate (CELLCEPT) 250 mg Cap Take 2 capsules (500 mg total) by mouth 2 (two) times daily. 120 capsule 11    omeprazole (PRILOSEC) 40 MG capsule TAKE 1 CAPSULE BY MOUTH EVERY MORNING 30 capsule 11    ondansetron (ZOFRAN) 8 MG tablet Take 1 tablet (8 mg total) by mouth every 8 (eight) hours as needed. 30 tablet 1    oxycodone (OXY-IR) 5 mg Cap 1 capsule (5mg) for pain scale 1-3  2 capsule (10mg) for pain scale 4-6  3 capsule (15 mg) for pain scale 7-10  Every four hours as needed for post-operative pain. 60 capsule 0    polyethylene glycol (GLYCOLAX) 17 gram PwPk Take 17 g by mouth 2 (two) times daily. 60 packet 11    predniSONE (DELTASONE) 10 MG tablet Take 4 tablets once daily for 5 days, then:  Take 3 tablets once daily for 2 days  Take 2 tablets once daily for 2 days  Take 1 tablet once daily for 2 days 32 tablet 0    predniSONE (DELTASONE) 5 MG tablet Take 5 mg by mouth once daily.      PROCHAMBER USE AS DIRECTED 1 Device 0    promethazine (PHENERGAN) 25 MG  tablet TAKE 1 TABLET BY MOUTH EVERY 8 HOURS AS NEEDED FOR NAUSEA 45 tablet 0    promethazine (PHENERGAN) 25 MG tablet TAKE 1 TABLET BY MOUTH EVERY 8 HOURS AS NEEDED FOR NAUSEA 45 tablet 1    tacrolimus (PROGRAF) 0.5 MG Cap Take 1 capsule (0.5 mg total) by mouth every 12 (twelve) hours. 60 capsule 11    tacrolimus (PROGRAF) 1 MG Cap Take 3 capsules (3 mg total) by mouth every 12 (twelve) hours. Daily doses: 3.5 mg every 12 hours. 180 capsule 11    tizanidine (ZANAFLEX) 4 MG tablet TAKE 2 TABLETS BY MOUTH EVERY 6 HOURS AS NEEDED 90 tablet 0       Past Surgical History:   Procedure Laterality Date    ABDOMINAL SURGERY      peg tube     CHOLECYSTECTOMY  2016    ENDOMETRIAL ABLATION  2015    [facility]    LUNG TRANSPLANT Bilateral 6/12/14    PORTACATH PLACEMENT Right     rt sc    SALPINGECTOMY Bilateral 2015    [facility]    SINUS SURGERY         Social History     Social History    Marital status: Single     Spouse name: N/A    Number of children: N/A    Years of education: N/A     Occupational History    Not on file.     Social History Main Topics    Smoking status: Never Smoker    Smokeless tobacco: Never Used    Alcohol use No      Comment: Has not had an alcoholic drink in more than 2 months.    Drug use: No    Sexual activity: Yes     Partners: Male     Birth control/ protection: Implant     Other Topics Concern    Not on file     Social History Narrative         Vital Signs Range (Last 24H):  Temp:  [36.7 °C (98 °F)-37 °C (98.6 °F)]   Pulse:  [103-144]   Resp:  [18-20]   BP: (120-158)/()   SpO2:  [93 %-98 %]       CBC:   Recent Labs      04/15/17   0526   WBC  7.43   RBC  3.11*   HGB  9.1*   HCT  29.3*   PLT  123*   MCV  94   MCH  29.3   MCHC  31.1*       CMP:   Recent Labs      04/15/17   0526   NA  141   K  3.9   CL  107   CO2  27   BUN  15   CREATININE  0.9   GLU  122*   MG  1.6   CALCIUM  8.3*       INR  No results for input(s): INR, PROTIME, APTT in the last 72 hours.    Invalid input(s):  PT        Diagnostic Studies:      EKG:  Normal sinus rhythm  Possible Left atrial enlargement  mild  Nonspecific T wave abnormality  Borderline Abnormal ECG  When compared with ECG of 19-MAY-2016 11:31,  Nonspecific T wave abnormality   is present  Confirmed by LENNY BOYKIN MD ([id number]) on 7/25/2016 8:41:10 AM    Referred By: JOSHUA GOLDBERG           Confirmed By:LENNY BOYKIN MD    2D Echo:      Anesthesia Evaluation    I have reviewed the Patient Summary Reports.    I have reviewed the Nursing Notes.   I have reviewed the Medications.   Prednisone    Review of Systems  Anesthesia Hx:  History of prior surgery of interest to airway management or planning: Previous anesthesia: General Denies Family Hx of Anesthesia complications.   Denies Personal Hx of Anesthesia complications.   Hematology/Oncology:         -- Anemia: -- Thrombocytopenia:    Cardiovascular:   Hypertension    Hepatic/GI:   GERD: S/p lung transplant.    Neurological:   Headaches Seizures, well controlled    Endocrine:   Diabetes    Psych:   Psychiatric History          Physical Exam  General:  Well nourished    Airway/Jaw/Neck:  Airway Findings: Mouth Opening: Normal Tongue: Normal  General Airway Assessment: Adult  Mallampati: I  Jaw/Neck Findings:  Neck ROM: Normal ROM      Dental:  Dental Findings: In tact   Chest/Lungs:  Chest/Lungs Findings: Clear to auscultation     Heart/Vascular:  Heart Findings: Rate: Normal  Rhythm: Regular Rhythm  Sounds: Normal        Mental Status:  Mental Status Findings:  Cooperative         Anesthesia Plan  Type of Anesthesia, risks & benefits discussed:  Anesthesia Type:  general  Patient's Preference: general   Intra-op Monitoring Plan:   Intra-op Monitoring Plan Comments:   Post Op Pain Control Plan:   Post Op Pain Control Plan Comments:   Induction:   IV  Beta Blocker:  Patient is not currently on a Beta-Blocker (No further documentation required).       Informed Consent: Patient understands risks and agrees with  Anesthesia plan.  Questions answered. Anesthesia consent signed with patient.  ASA Score: 3     Day of Surgery Review of History & Physical:    H&P update referred to the surgeon.         Ready For Surgery From Anesthesia Perspective.

## 2017-04-16 PROBLEM — H65.191 ACUTE MUCOID OTITIS MEDIA OF RIGHT EAR: Status: ACTIVE | Noted: 2017-04-16

## 2017-04-16 LAB
ANION GAP SERPL CALC-SCNC: 8 MMOL/L
BASOPHILS # BLD AUTO: 0 K/UL
BASOPHILS NFR BLD: 0 %
BUN SERPL-MCNC: 17 MG/DL
CALCIUM SERPL-MCNC: 8.4 MG/DL
CHLORIDE SERPL-SCNC: 104 MMOL/L
CO2 SERPL-SCNC: 27 MMOL/L
CREAT SERPL-MCNC: 1 MG/DL
DIFFERENTIAL METHOD: ABNORMAL
EOSINOPHIL # BLD AUTO: 0 K/UL
EOSINOPHIL NFR BLD: 0.5 %
ERYTHROCYTE [DISTWIDTH] IN BLOOD BY AUTOMATED COUNT: 14.7 %
EST. GFR  (AFRICAN AMERICAN): >60 ML/MIN/1.73 M^2
EST. GFR  (NON AFRICAN AMERICAN): >60 ML/MIN/1.73 M^2
GLUCOSE SERPL-MCNC: 172 MG/DL
HCT VFR BLD AUTO: 29.4 %
HGB BLD-MCNC: 9.3 G/DL
LYMPHOCYTES # BLD AUTO: 2.5 K/UL
LYMPHOCYTES NFR BLD: 29.9 %
MAGNESIUM SERPL-MCNC: 1.9 MG/DL
MCH RBC QN AUTO: 29.6 PG
MCHC RBC AUTO-ENTMCNC: 31.6 %
MCV RBC AUTO: 94 FL
MONOCYTES # BLD AUTO: 0.4 K/UL
MONOCYTES NFR BLD: 5.3 %
NEUTROPHILS # BLD AUTO: 5.3 K/UL
NEUTROPHILS NFR BLD: 64.1 %
PLATELET # BLD AUTO: 152 K/UL
PMV BLD AUTO: 9.6 FL
POCT GLUCOSE: 131 MG/DL (ref 70–110)
POTASSIUM SERPL-SCNC: 4 MMOL/L
RBC # BLD AUTO: 3.14 M/UL
SODIUM SERPL-SCNC: 139 MMOL/L
TACROLIMUS BLD-MCNC: 9.2 NG/ML
WBC # BLD AUTO: 8.27 K/UL

## 2017-04-16 PROCEDURE — 80197 ASSAY OF TACROLIMUS: CPT

## 2017-04-16 PROCEDURE — 83735 ASSAY OF MAGNESIUM: CPT

## 2017-04-16 PROCEDURE — 99232 SBSQ HOSP IP/OBS MODERATE 35: CPT | Mod: ,,, | Performed by: INTERNAL MEDICINE

## 2017-04-16 PROCEDURE — 63600175 PHARM REV CODE 636 W HCPCS: Performed by: PAIN MEDICINE

## 2017-04-16 PROCEDURE — 63600175 PHARM REV CODE 636 W HCPCS: Performed by: INTERNAL MEDICINE

## 2017-04-16 PROCEDURE — 25000003 PHARM REV CODE 250: Performed by: INTERNAL MEDICINE

## 2017-04-16 PROCEDURE — 63600175 PHARM REV CODE 636 W HCPCS: Performed by: ANESTHESIOLOGY

## 2017-04-16 PROCEDURE — 63600175 PHARM REV CODE 636 W HCPCS: Performed by: STUDENT IN AN ORGANIZED HEALTH CARE EDUCATION/TRAINING PROGRAM

## 2017-04-16 PROCEDURE — 85025 COMPLETE CBC W/AUTO DIFF WBC: CPT

## 2017-04-16 PROCEDURE — 25000003 PHARM REV CODE 250: Performed by: STUDENT IN AN ORGANIZED HEALTH CARE EDUCATION/TRAINING PROGRAM

## 2017-04-16 PROCEDURE — 80048 BASIC METABOLIC PNL TOTAL CA: CPT

## 2017-04-16 PROCEDURE — 20600001 HC STEP DOWN PRIVATE ROOM

## 2017-04-16 PROCEDURE — 25000003 PHARM REV CODE 250: Performed by: ANESTHESIOLOGY

## 2017-04-16 RX ORDER — CIPROFLOXACIN AND DEXAMETHASONE 3; 1 MG/ML; MG/ML
4 SUSPENSION/ DROPS AURICULAR (OTIC) 2 TIMES DAILY
Status: DISCONTINUED | OUTPATIENT
Start: 2017-04-16 | End: 2017-04-18 | Stop reason: HOSPADM

## 2017-04-16 RX ORDER — OXYMETAZOLINE HCL 0.05 %
2 SPRAY, NON-AEROSOL (ML) NASAL 2 TIMES DAILY
Status: DISCONTINUED | OUTPATIENT
Start: 2017-04-16 | End: 2017-04-18 | Stop reason: HOSPADM

## 2017-04-16 RX ORDER — HYDROMORPHONE HYDROCHLORIDE 1 MG/ML
1 INJECTION, SOLUTION INTRAMUSCULAR; INTRAVENOUS; SUBCUTANEOUS ONCE
Status: COMPLETED | OUTPATIENT
Start: 2017-04-16 | End: 2017-04-16

## 2017-04-16 RX ORDER — PSEUDOEPHEDRINE HCL 30 MG
60 TABLET ORAL DAILY
Status: DISCONTINUED | OUTPATIENT
Start: 2017-04-16 | End: 2017-04-18 | Stop reason: HOSPADM

## 2017-04-16 RX ADMIN — CEFEPIME HYDROCHLORIDE 2 G: 2 INJECTION, SOLUTION INTRAVENOUS at 12:04

## 2017-04-16 RX ADMIN — DIPHENHYDRAMINE HYDROCHLORIDE 50 MG: 50 INJECTION, SOLUTION INTRAMUSCULAR; INTRAVENOUS at 11:04

## 2017-04-16 RX ADMIN — VANCOMYCIN HYDROCHLORIDE 750 MG: 1 INJECTION, POWDER, LYOPHILIZED, FOR SOLUTION INTRAVENOUS at 03:04

## 2017-04-16 RX ADMIN — OXYMETAZOLINE HYDROCHLORIDE 2 SPRAY: 5 SPRAY NASAL at 09:04

## 2017-04-16 RX ADMIN — MYCOPHENOLATE MOFETIL 500 MG: 250 CAPSULE ORAL at 08:04

## 2017-04-16 RX ADMIN — GABAPENTIN 300 MG: 300 CAPSULE ORAL at 09:04

## 2017-04-16 RX ADMIN — DIPHENHYDRAMINE HYDROCHLORIDE 50 MG: 50 INJECTION, SOLUTION INTRAMUSCULAR; INTRAVENOUS at 02:04

## 2017-04-16 RX ADMIN — TACROLIMUS 3.5 MG: 1 CAPSULE ORAL at 05:04

## 2017-04-16 RX ADMIN — DIPHENHYDRAMINE HYDROCHLORIDE 50 MG: 50 INJECTION, SOLUTION INTRAMUSCULAR; INTRAVENOUS at 06:04

## 2017-04-16 RX ADMIN — Medication 3 ML: at 02:04

## 2017-04-16 RX ADMIN — OXYCODONE HYDROCHLORIDE AND ACETAMINOPHEN 1 TABLET: 10; 325 TABLET ORAL at 11:04

## 2017-04-16 RX ADMIN — Medication 3 ML: at 10:04

## 2017-04-16 RX ADMIN — LORAZEPAM 2 MG: 1 TABLET ORAL at 10:04

## 2017-04-16 RX ADMIN — HYDROMORPHONE HYDROCHLORIDE 1.5 MG: 1 INJECTION, SOLUTION INTRAMUSCULAR; INTRAVENOUS; SUBCUTANEOUS at 01:04

## 2017-04-16 RX ADMIN — LORAZEPAM 2 MG: 1 TABLET ORAL at 01:04

## 2017-04-16 RX ADMIN — ARIPIPRAZOLE 2 MG: 2 TABLET ORAL at 08:04

## 2017-04-16 RX ADMIN — CIPROFLOXACIN AND DEXAMETHASONE 4 DROP: 3; 1 SUSPENSION/ DROPS AURICULAR (OTIC) at 09:04

## 2017-04-16 RX ADMIN — CEFEPIME HYDROCHLORIDE 2 G: 2 INJECTION, SOLUTION INTRAVENOUS at 01:04

## 2017-04-16 RX ADMIN — DIPHENHYDRAMINE HYDROCHLORIDE 50 MG: 50 INJECTION, SOLUTION INTRAMUSCULAR; INTRAVENOUS at 08:04

## 2017-04-16 RX ADMIN — GABAPENTIN 300 MG: 300 CAPSULE ORAL at 01:04

## 2017-04-16 RX ADMIN — HYDROMORPHONE HYDROCHLORIDE 1.5 MG: 1 INJECTION, SOLUTION INTRAMUSCULAR; INTRAVENOUS; SUBCUTANEOUS at 06:04

## 2017-04-16 RX ADMIN — HYDROMORPHONE HYDROCHLORIDE 1.5 MG: 1 INJECTION, SOLUTION INTRAMUSCULAR; INTRAVENOUS; SUBCUTANEOUS at 11:04

## 2017-04-16 RX ADMIN — HYDROMORPHONE HYDROCHLORIDE 1 MG: 1 INJECTION, SOLUTION INTRAMUSCULAR; INTRAVENOUS; SUBCUTANEOUS at 03:04

## 2017-04-16 RX ADMIN — PREDNISONE 5 MG: 5 TABLET ORAL at 08:04

## 2017-04-16 RX ADMIN — PROMETHAZINE HYDROCHLORIDE 25 MG: 25 INJECTION, SOLUTION INTRAMUSCULAR; INTRAVENOUS at 09:04

## 2017-04-16 RX ADMIN — HYDROMORPHONE HYDROCHLORIDE 1.5 MG: 1 INJECTION, SOLUTION INTRAMUSCULAR; INTRAVENOUS; SUBCUTANEOUS at 08:04

## 2017-04-16 RX ADMIN — PANCRELIPASE 6 CAPSULE: 24000; 76000; 120000 CAPSULE, DELAYED RELEASE PELLETS ORAL at 08:04

## 2017-04-16 RX ADMIN — DIPHENHYDRAMINE HYDROCHLORIDE 50 MG: 50 INJECTION, SOLUTION INTRAMUSCULAR; INTRAVENOUS at 03:04

## 2017-04-16 RX ADMIN — DAPSONE 100 MG: 100 TABLET ORAL at 08:04

## 2017-04-16 RX ADMIN — INSULIN ASPART 2 UNITS: 100 INJECTION, SOLUTION INTRAVENOUS; SUBCUTANEOUS at 11:04

## 2017-04-16 RX ADMIN — TACROLIMUS 3.5 MG: 1 CAPSULE ORAL at 08:04

## 2017-04-16 RX ADMIN — MYCOPHENOLATE MOFETIL 500 MG: 250 CAPSULE ORAL at 09:04

## 2017-04-16 RX ADMIN — INSULIN ASPART 2 UNITS: 100 INJECTION, SOLUTION INTRAVENOUS; SUBCUTANEOUS at 08:04

## 2017-04-16 RX ADMIN — GABAPENTIN 300 MG: 300 CAPSULE ORAL at 04:04

## 2017-04-16 RX ADMIN — INSULIN ASPART 3 UNITS: 100 INJECTION, SOLUTION INTRAVENOUS; SUBCUTANEOUS at 08:04

## 2017-04-16 RX ADMIN — OXYCODONE HYDROCHLORIDE AND ACETAMINOPHEN 1 TABLET: 10; 325 TABLET ORAL at 05:04

## 2017-04-16 RX ADMIN — MIRTAZAPINE 30 MG: 15 TABLET, FILM COATED ORAL at 09:04

## 2017-04-16 RX ADMIN — PROMETHAZINE HYDROCHLORIDE 25 MG: 25 INJECTION, SOLUTION INTRAMUSCULAR; INTRAVENOUS at 08:04

## 2017-04-16 NOTE — PROGRESS NOTES
ENT Progress Note    Patient: Juanita Ibarra is a 27 y.o. female who presented on 4/15/2017 for right otomastoiditis.    Subjective:  Underwent myringotomy last night.  Complains of pain overnight, some right ear drainage.  No other acute issues.  Tolerating diet.    Objective:  Temp:  [97.7 °F (36.5 °C)-98.3 °F (36.8 °C)] 97.8 °F (36.6 °C)  Pulse:  [] 109  Resp:  [10-20] 16  SpO2:  [93 %-100 %] 95 %  BP: (132-169)/() 135/84    Exam:   Gen - Alert, awake, oriented  Head - Normocephalic  Eyes - EOMI  Ears - AD cotton removed, drainage removed from ear with suction, tube patent in proper position. Mastoid tenderness decreased. AS externl ear and EAC/TM normal  Neck - Supple, soft  Voice - Normal  Respiratory - No distress, no stridor or dyspnea    Assessment: 27 year old female with history of CF s/p lung transplant with acute right otomastoiditis status post myringotomy and PE tube on 4/15/17    Plan:  - Continue IV antibiotics  - Ciprodex gtt 4 drops right ear TID  - May place cotton ball in external ear for drainage  - Follow up cultures  - Management of transplant and pulmnonary conditions per primary team

## 2017-04-16 NOTE — NURSING TRANSFER
Nursing Transfer Note      4/16/2017     Transfer To: [id number]    Transfer via stretcher    Transfer with na    Transported by RN    Medicines sent: no     Chart send with patient: Yes    Notified: boyfriend

## 2017-04-16 NOTE — OP NOTE
DATE OF PROCEDURE:  04/15/2017    PREOPERATIVE DIAGNOSIS:  Acute right otitis media with uncomplicated   non-coalescent right mastoiditis.    POSTOPERATIVE DIAGNOSIS:  Acute right otitis media with uncomplicated   non-coalescent right mastoiditis.    PROCEDURE:  Right-sided myringotomy with placement of ventilating tube.    SURGEON:  Gary Null M.D.    ASSISTANT:  Khanh Arellano M.D. (RES)    ANESTHESIA:  General.    BLOOD LOSS:  Minimal.    COMPLICATIONS:  None.    SPECIMENS:  None.    CULTURES:  Right middle ear contents for aerobic, anaerobic, AFB and fungal.    FINDINGS:  Thick mucoid effusion with mild retraction of the anterior aspect of   the right tympanic membrane.  An Small beveled grommet was placed through   the myringotomy incision in the anterior-inferior quadrant.    INDICATIONS FOR PROCEDURE:  The patient is a 27-year-old lady with a history of   cystic fibrosis and lung transplant and chronic immunosuppression.  She recently   underwent revision endoscopic sinus surgery and soon thereafter developed acute   onset of right-sided otalgia with otorrhea, which subtotally responded to   drops.  The otorrhea has resolved, but she persists with the right-sided   otalgia.  Noncontrast imaging at an outside facility was obtained, but imaging   was not available for review.  Repeat contrast-enhanced imaging obtained at our   center indicated right-sided otitis media with non-coalescent uncomplicated   right mastoiditis.  The recommendation was made to proceed to the Operating Room   for placement of a ventilating tube in order to obtain cultures to direct   medical therapy, as well as in order to achieve means for administering topical   otic medications.  Prior to the procedure, the risks, benefits and options were   discussed at length with the patient.  Risks included, but were not limited to   pain, otorrhea, bleeding, infection, scar, tympanic membrane perforation, early extrusion,   migration  of the tube, cholesteatoma, worsening of hearing, vertigo, and damage to the   surrounding structures.  In spite of the risks of surgery, the patient agreed to   move forward with the procedure.  Informed consent was obtained.    PROCEDURE IN DETAIL:  The patient was positively identified in the Preoperative   Holding Area.  She was brought to the Operating Room and was placed in the flat   supine position.  General anesthesia was administered via mask ventilation.  A   final time-out was performed for verification purposes.  The operating   microscope was brought into the field.  The remainder of the case was performed   under maximum magnification of the operating microscope.  The speculum was   inserted and the ear was examined.  Findings were as noted above.  A myringotomy   blade was used to make an incision in the anterior-inferior quadrant of the   tympanic membrane.  A gush of dark, dishwater type fluid was obtained, followed   by slight hemorrhage mixed with mucoid contents.  The middle ear contents were   suctioned into a Lukens trap, which were sent for cultures as outlined above.    Multiple irrigations of the middle ear were performed with normal saline.    Additional thick mucoid contents were suctioned through the myringotomy   incision.  Once the middle ear was maximally aerated via the myringotomy, an   Small beveled grommet was inserted through the myringotomy incision using a   microaligator forceps and a curved pick.  After placement of the tube,   Ciprodex drops were administered.  With this, the procedure was brought to   completion.  All equipment was removed.  The patient was turned back over to the   Anesthesiology team for awaking, which was uneventful.  The patient was   thereafter escorted to the Recovery Room in good condition.  The patient   tolerated the procedure well.  There were no complications.  All needle, sponge   and instrument counts were correct at the end of the  case.    ATTESTATION:  I, as the attending of record, was present and participated in all   portions of this procedure.      OSVALDO  dd: 04/15/2017 22:21:44 (CDT)  td: 04/15/2017 23:34:17 (CDT)  Doc ID   #[id number]  Job ID #[id number]    CC:

## 2017-04-16 NOTE — PLAN OF CARE
Problem: Patient Care Overview  Goal: Plan of Care Review  Outcome: Ongoing (interventions implemented as appropriate)  Pt aao x 4. Patient currently in bed with bed in lowest/locked position. Call light within reach. Patient free from falls/injury this shift; wearing non-skid footwear when ambulating. Patient also encouraged to call for assistance when walking. Patient's BG within normal limits, patient is receiving 2 units of MTI. Levemir 5 units d/c. Patient has received ear drops; ordered to have 10 drops to right ear BID. Patient also ordered nasal spray. Patient has received IV dilaudid 1.5 mg for pain, PO ativan, IV phenergan for nausea, and IV benadryl for itching. Patient has received IV abx.      Will continue to monitor.

## 2017-04-16 NOTE — PROGRESS NOTES
Progress Note - Floor  Lung Transplant      Admit Date: 4/15/2017   LOS: 1 day     SUBJECTIVE:     Follow-up For:  Acute otitis/mastoidistis     Gisel had emergent myringotomy performed last night. Says that her pain is much improved. Denies shortness of breath or cough.     Review of Systems   Constitutional: Negative for chills, diaphoresis, fever, malaise/fatigue and weight loss.   HENT: Positive for ear pain (improved). Negative for congestion, ear discharge, hearing loss, nosebleeds, sore throat and tinnitus.    Eyes: Negative for blurred vision, double vision, photophobia, pain, discharge and redness.   Respiratory: Negative for cough, hemoptysis, sputum production, shortness of breath, wheezing and stridor.    Cardiovascular: Negative for chest pain, palpitations, orthopnea, claudication, leg swelling and PND.   Gastrointestinal: Negative for abdominal pain, blood in stool, constipation, diarrhea, heartburn, melena, nausea and vomiting.   Genitourinary: Negative for dysuria, flank pain, frequency, hematuria and urgency.   Musculoskeletal: Negative for back pain, falls, joint pain, myalgias and neck pain.   Skin: Negative for itching and rash.   Neurological: Positive for headaches. Negative for dizziness, tingling, tremors, sensory change, speech change, focal weakness, seizures, loss of consciousness and weakness.   Endo/Heme/Allergies: Negative for environmental allergies and polydipsia. Does not bruise/bleed easily.   Psychiatric/Behavioral: Negative for depression, hallucinations and memory loss. The patient is not nervous/anxious and does not have insomnia.      OBJECTIVE:     Vital Signs (Most Recent)  Temp: 97.8 °F (36.6 °C) (04/16/17 1051)  Pulse: 99 (04/16/17 1051)  Resp: 16 (04/16/17 1051)  BP: (!) 141/102 (04/16/17 1051)  SpO2: 95 % (04/16/17 1051)    Vital Signs Range (Last 24H):  Temp:  [97.7 °F (36.5 °C)-98.4 °F (36.9 °C)]   Pulse:  []   Resp:  [10-20]   BP: (124-169)/()   SpO2:  [93  %-100 %]     I & O (Last 24H):  Intake/Output Summary (Last 24 hours) at 04/16/17 1446  Last data filed at 04/16/17 0500   Gross per 24 hour   Intake             1690 ml   Output                0 ml   Net             1690 ml     Physical Exam   Constitutional: She is oriented to person, place, and time and well-developed, well-nourished, and in no distress. No distress.   HENT:   Head: Normocephalic.   Nose: Nose normal.   Mouth/Throat: Oropharynx is clear and moist. No oropharyngeal exudate.   Ears not examined   Eyes: Conjunctivae and EOM are normal. Pupils are equal, round, and reactive to light. No scleral icterus.   Neck: Normal range of motion. Neck supple. No JVD present. No tracheal deviation present. No thyromegaly present.   Cardiovascular: Normal rate, regular rhythm and intact distal pulses.  Exam reveals no gallop and no friction rub.    No murmur heard.  Pulmonary/Chest: Effort normal. No stridor. No respiratory distress. She has no wheezes. She has no rales. She exhibits no tenderness.   Abdominal: Soft. Bowel sounds are normal. She exhibits no distension and no mass. There is no tenderness. There is no rebound and no guarding.   Musculoskeletal: Normal range of motion. She exhibits no edema.   Lymphadenopathy:     She has no cervical adenopathy.   Neurological: She is alert and oriented to person, place, and time. No cranial nerve deficit. Gait normal. Coordination normal.   Skin: Skin is warm and dry. No rash noted. She is not diaphoretic. No erythema. No pallor.   Psychiatric: Mood and affect normal.     Laboratory:  CBC:    Recent Labs  Lab 04/16/17  0500   WBC 8.27   RBC 3.14*   HGB 9.3*   HCT 29.4*      MCV 94   MCH 29.6   MCHC 31.6*     BMP:    Recent Labs  Lab 04/16/17  0500      K 4.0      CO2 27   BUN 17   CREATININE 1.0   CALCIUM 8.4*      Microbiology Results (last 7 days)     Procedure Component Value Units Date/Time    Fungus culture [[id number]] Collected:  04/15/17  2236    Order Status:  Sent Specimen:  Abscess from Ear, Right Updated:  04/15/17 2240    Culture, Anaerobe [[id number]] Collected:  04/15/17 2237    Order Status:  Sent Specimen:  Abscess from Ear, Right Updated:  04/15/17 2239    AFB Culture & Smear [[id number]] Collected:  04/15/17 2237    Order Status:  Sent Specimen:  Abscess from Elbow, Right Updated:  04/15/17 2239    Aerobic culture [[id number]] Collected:  04/15/17 2237    Order Status:  Sent Specimen:  Abscess from Ear, Right Updated:  04/15/17 2238    Clostridium difficile EIA [[id number]]     Order Status:  No result Specimen:  Stool from Stool           ASSESSMENT/PLAN:     Active Hospital Problems    Diagnosis  POA    *Mastoiditis [H70.90]  Yes    Acute mucoid otitis media of right ear [H65.111]  Yes    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     1. Acute otitis/mastoiditis - s/p successful myringotomy. Will continue cefepime and vancomycin and follow up on cultures. ENT to follow during the week and will decid on further mastoid interventions. Continue current pain management regimen for now.     2. S/P Bilateral Lung Transplant with severe CARLOS EDUARDO - no suspicion for worsening of disease.     3. Immunosuppression - continue tacrolimus, prednisone ,and mycophenolate    4. Prophylactic antibiotic - continue dapsone    5. CFRDM - appreciate endo recs.

## 2017-04-16 NOTE — ANESTHESIA RELEASE NOTE
"Anesthesia Release from PACU Note    Patient: Juanita Ibarra    Procedure(s) Performed: Procedure(s) (LRB):  MYRINGOTOMY WITH INSERTION OF PE TUBES (Right)    Anesthesia type: general    Post pain: Adequate analgesia    Post assessment: no apparent anesthetic complications    Last Vitals:   Visit Vitals    BP (!) 167/115    Pulse (!) 111    Temp 36.5 °C (97.7 °F) (Oral)    Resp 18    Ht 5' 0.5" (1.537 m)    Wt 50.3 kg (111 lb)    SpO2 98%    Breastfeeding No    BMI 21.32 kg/m2       Post vital signs: stable    Level of consciousness: awake and alert     Nausea/Vomiting: no nausea/no vomiting    Complications: none    Airway Patency: patent    Respiratory: unassisted    Cardiovascular: stable and blood pressure at baseline    Hydration: euvolemic  "

## 2017-04-16 NOTE — ANESTHESIA POSTPROCEDURE EVALUATION
"Anesthesia Post Evaluation    Patient: Juanita Ibarra    Procedure(s) Performed: Procedure(s) (LRB):  MYRINGOTOMY WITH INSERTION OF PE TUBES (Right)    Final Anesthesia Type: general  Patient location during evaluation: PACU  Patient participation: Yes- Able to Participate  Level of consciousness: awake and alert and oriented  Post-procedure vital signs: reviewed and stable  Pain management: adequate  Airway patency: patent  PONV status at discharge: No PONV  Anesthetic complications: no      Cardiovascular status: blood pressure returned to baseline  Respiratory status: unassisted  Hydration status: euvolemic          Visit Vitals    BP (!) 167/115    Pulse (!) 111    Temp 36.5 °C (97.7 °F) (Oral)    Resp 18    Ht 5' 0.5" (1.537 m)    Wt 50.3 kg (111 lb)    SpO2 98%    Breastfeeding No    BMI 21.32 kg/m2       Pain/Richard Score: Pain Assessment Performed: Yes (4/15/2017 10:45 PM)  Presence of Pain: complains of pain/discomfort (4/15/2017 10:45 PM)  Pain Rating Prior to Med Admin: 6 (4/15/2017 11:20 PM)  Pain Rating Post Med Admin: 4 (4/15/2017  4:20 AM)  Richard Score: 10 (4/15/2017 10:45 PM)      "

## 2017-04-16 NOTE — SUBJECTIVE & OBJECTIVE
"Interval HPI:   Overnight events: Bg at or below goal.    Eatin%  Nausea: No  Hypoglycemia and intervention: Lowest bg value 74  Fever: No  TPN and/or TF: No  If yes, type of TF/TPN and rate: n/a    /89 (BP Location: Right arm, Patient Position: Lying, BP Method: Automatic)  Pulse 109  Temp 98.4 °F (36.9 °C) (Oral)   Resp 12  Ht 5' 0.5" (1.537 m)  Wt 50.8 kg (112 lb)  SpO2 95%  Breastfeeding? No  BMI 21.51 kg/m2    Labs Reviewed and Include      Recent Labs  Lab 17  0500   *   CALCIUM 8.4*      K 4.0   CO2 27      BUN 17   CREATININE 1.0     Lab Results   Component Value Date    WBC 8.27 2017    HGB 9.3 (L) 2017    HCT 29.4 (L) 2017    MCV 94 2017     2017     No results for input(s): TSH, FREET4 in the last 168 hours.  Lab Results   Component Value Date    HGBA1C 4.2 (L) 2016       Nutritional status:   Body mass index is 21.51 kg/(m^2).  Lab Results   Component Value Date    ALBUMIN 4.1 2017    ALBUMIN 4.0 2017    ALBUMIN 4.3 2016     Lab Results   Component Value Date    PREALBUMIN 15 (L) 2014    PREALBUMIN 11 (L) 2014    PREALBUMIN 18 (L) 2014       Estimated Creatinine Clearance: 61.7 mL/min (based on Cr of 1).    Accu-Checks  Recent Labs      04/15/17   1005  04/15/17   1437  04/15/17   2039  04/15/17   2233   POCTGLUCOSE  185*  337*  74  107       Current Medications and/or Treatments Impacting Glycemic Control  Immunotherapy:  Immunosuppressants     Start     Stop Route Frequency Ordered    04/15/17 0030  mycophenolate capsule 500 mg      -- Oral 2 times daily 04/15/17 0024    04/15/17 0800  tacrolimus capsule 3.5 mg      -- Oral 2 times daily 04/15/17 0024        Steroids:   Hormones     Start     Stop Route Frequency Ordered    04/15/17 0900  predniSONE tablet 5 mg      -- Oral Daily 04/15/17 0024        Pressors:    Autonomic Drugs     None        Hyperglycemia/Diabetes " Medications: Antihyperglycemics     Start     Stop Route Frequency Ordered    04/15/17 0930  insulin aspart pen 2 Units      -- SubQ 3 times daily with meals 04/15/17 0923    04/15/17 1023  insulin aspart pen 0-5 Units      -- SubQ Before meals & nightly PRN 04/15/17 0923

## 2017-04-16 NOTE — NURSING
"Pt requesting PRN order for dilaudid previously in place prior to myringotomy. Order is for dilaudid 1.5 mg IVP Q 5 hours. Received 2.6 mg dilaudid IVP ~ 0000. Pt aware of this and states "I was told what I was given in the PACU wouldn't count against me and I could get more dilaudid without waiting when I got back to the floor."  Explained to pt pain medication along with other PRNs received and meds are recorded and given regardless of where in the hospital she is at the time. Pt insisting on this RN calling ENT on-call for more pain medication.     Spoke with MD Arellano (ENT) - notified of above and pt's history of drug-seeking behavior. MD ordered dilaudid 1 mg IVP x 1.   "

## 2017-04-16 NOTE — PROGRESS NOTES
"[facility]  Endocrinology  Progress Note    Admit Date: 4/15/2017     Reason for Consult: Management of CFRDM, Hyperglycemia     Surgical Procedure and Date: Bilateral Lung trx 2014, Sinus surgery 2017  Diabetes diagnosis year: 3/2017    Home Diabetes Medications:  Toujeo 5 units qam    How often checking glucose at home? 1-3 x day   BG readings on regimen: ~140s  Hypoglycemia on the regimen?  No  Missed doses on regimen?  No    Diabetes Complications include:     Hyperglycemia    Complicating diabetes co morbidities:   Glucocorticoid use       HPI:   Patient is a 27 y.o. female with a diagnosis of CFRDM, chronic sinusitis, Bilateral Lung transplant secondary to CF presents with chronic ethmoidal sinusitis. Endocrinology consulted for blood glucose management.  Patient recently started on Toujeo 5 units qam in 3/2017.         Interval HPI:   Overnight events: Bg at or below goal.    Eatin%  Nausea: No  Hypoglycemia and intervention: Lowest bg value 74  Fever: No  TPN and/or TF: No  If yes, type of TF/TPN and rate: n/a    /89 (BP Location: Right arm, Patient Position: Lying, BP Method: Automatic)  Pulse 109  Temp 98.4 °F (36.9 °C) (Oral)   Resp 12  Ht 5' 0.5" (1.537 m)  Wt 50.8 kg (112 lb)  SpO2 95%  Breastfeeding? No  BMI 21.51 kg/m2    Labs Reviewed and Include      Recent Labs  Lab 17  0500   *   CALCIUM 8.4*      K 4.0   CO2 27      BUN 17   CREATININE 1.0     Lab Results   Component Value Date    WBC 8.27 2017    HGB 9.3 (L) 2017    HCT 29.4 (L) 2017    MCV 94 2017     2017     No results for input(s): TSH, FREET4 in the last 168 hours.  Lab Results   Component Value Date    HGBA1C 4.2 (L) 2016       Nutritional status:   Body mass index is 21.51 kg/(m^2).  Lab Results   Component Value Date    ALBUMIN 4.1 2017    ALBUMIN 4.0 2017    ALBUMIN 4.3 2016     Lab Results   Component " Value Date    PREALBUMIN 15 (L) 05/29/2014    PREALBUMIN 11 (L) 05/09/2014    PREALBUMIN 18 (L) 03/19/2014       Estimated Creatinine Clearance: 61.7 mL/min (based on Cr of 1).    Accu-Checks  Recent Labs      04/15/17   1005  04/15/17   1437  04/15/17   2039  04/15/17   2233   POCTGLUCOSE  185*  337*  74  107       Current Medications and/or Treatments Impacting Glycemic Control  Immunotherapy:  Immunosuppressants     Start     Stop Route Frequency Ordered    04/15/17 0030  mycophenolate capsule 500 mg      -- Oral 2 times daily 04/15/17 0024    04/15/17 0800  tacrolimus capsule 3.5 mg      -- Oral 2 times daily 04/15/17 0024        Steroids:   Hormones     Start     Stop Route Frequency Ordered    04/15/17 0900  predniSONE tablet 5 mg      -- Oral Daily 04/15/17 0024        Pressors:    Autonomic Drugs     None        Hyperglycemia/Diabetes Medications: Antihyperglycemics     Start     Stop Route Frequency Ordered    04/15/17 0930  insulin aspart pen 2 Units      -- SubQ 3 times daily with meals 04/15/17 0923    04/15/17 1023  insulin aspart pen 0-5 Units      -- SubQ Before meals & nightly PRN 04/15/17 0923          ASSESSMENT and PLAN    * Mastoiditis  S/p surgery  On dexamethasone      Adverse effect of glucocorticoid or synthetic analogue  Increases prandial insulin needs more than basal      Immunosuppression  Can increase insulin requirements      Lung transplant status, bilateral  Management per Lung transplant      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  BG goal 140-180 while hospitalized  D/C levemir, Cont aspart 2 units + low dose correction with meals  Ac/hs  Expect blood glucose to rise with dexamethasone use        Harleen Riley MD  Endocrinology  [facility]

## 2017-04-16 NOTE — PLAN OF CARE
Problem: Patient Care Overview  Goal: Plan of Care Review  Outcome: Ongoing (interventions implemented as appropriate)  No drainage noted from right ear s/p myringotomy with PE tube insertion. Pt states vertigo unchanged. Will cont to monitor.     Problem: Infection, Risk/Actual (Adult)  Goal: Infection Prevention/Resolution  Patient will demonstrate the desired outcomes by discharge/transition of care.   Outcome: Ongoing (interventions implemented as appropriate)  Afebrile. Discussed importance of hand hygiene with pt and boyfriend - both verbalized understanding.  Cont vancomycin (benadryl 30 min before d/t allergy) and cefepime.     Problem: Fall Risk (Adult)  Goal: Absence of Falls  Patient will demonstrate the desired outcomes by discharge/transition of care.   Outcome: Ongoing (interventions implemented as appropriate)  Fall precautions maintained. Bed wheels locked, bed in lowest position, upper SR up x 2, call light in reach, non-skid socks when OOB. Instructed pt to call for assistance as needed. Requires assist x 1 with ambulation d/t vertigo.     Problem: Pain, Acute (Adult)  Goal: Acceptable Pain Control/Comfort Level  Patient will demonstrate the desired outcomes by discharge/transition of care.  Outcome: Ongoing (interventions implemented as appropriate)  Dilaudid 1 mg IVP x 1 given per ENT post myringotomy. Pain management per primary care.

## 2017-04-16 NOTE — ASSESSMENT & PLAN NOTE
BG goal 140-180 while hospitalized  D/C levemir, Cont aspart 2 units + low dose correction with meals  Ac/hs  Expect blood glucose to rise with dexamethasone use

## 2017-04-16 NOTE — TRANSFER OF CARE
"Anesthesia Transfer of Care Note    Patient: Juanita Ibarra    Procedure(s) Performed: Procedure(s) (LRB):  MYRINGOTOMY WITH INSERTION OF PE TUBES (Right)    Patient location: PACU    Anesthesia Type: general    Transport from OR: Transported from OR on room air with adequate spontaneous ventilation    Post pain: pain needs to be addressed    Post assessment: no apparent anesthetic complications and tolerated procedure well    Post vital signs: stable    Level of consciousness: awake and alert    Nausea/Vomiting: no nausea/vomiting    Complications: none          Last vitals:   Visit Vitals    BP (!) 132/105 (BP Location: Left arm, Patient Position: Sitting, BP Method: Automatic)    Pulse 110    Temp 36.8 °C (98.3 °F) (Oral)    Resp 18    Ht 5' 0.5" (1.537 m)    Wt 50.3 kg (111 lb)    SpO2 95%    Breastfeeding No    BMI 21.32 kg/m2     "

## 2017-04-17 LAB
ANION GAP SERPL CALC-SCNC: 8 MMOL/L
BASOPHILS # BLD AUTO: 0 K/UL
BASOPHILS NFR BLD: 0 %
BUN SERPL-MCNC: 27 MG/DL
CALCIUM SERPL-MCNC: 8.5 MG/DL
CHLORIDE SERPL-SCNC: 104 MMOL/L
CO2 SERPL-SCNC: 28 MMOL/L
CREAT SERPL-MCNC: 1 MG/DL
DIFFERENTIAL METHOD: ABNORMAL
EOSINOPHIL # BLD AUTO: 0.1 K/UL
EOSINOPHIL NFR BLD: 1.3 %
ERYTHROCYTE [DISTWIDTH] IN BLOOD BY AUTOMATED COUNT: 14.8 %
EST. GFR  (AFRICAN AMERICAN): >60 ML/MIN/1.73 M^2
EST. GFR  (NON AFRICAN AMERICAN): >60 ML/MIN/1.73 M^2
GLUCOSE SERPL-MCNC: 124 MG/DL
HCT VFR BLD AUTO: 30.1 %
HGB BLD-MCNC: 9.5 G/DL
LYMPHOCYTES # BLD AUTO: 4 K/UL
LYMPHOCYTES NFR BLD: 38.7 %
MAGNESIUM SERPL-MCNC: 1.5 MG/DL
MCH RBC QN AUTO: 29.5 PG
MCHC RBC AUTO-ENTMCNC: 31.6 %
MCV RBC AUTO: 94 FL
MONOCYTES # BLD AUTO: 0.6 K/UL
MONOCYTES NFR BLD: 5.4 %
NEUTROPHILS # BLD AUTO: 5.6 K/UL
NEUTROPHILS NFR BLD: 54.3 %
PLATELET # BLD AUTO: 141 K/UL
PMV BLD AUTO: 9.4 FL
POCT GLUCOSE: 148 MG/DL (ref 70–110)
POCT GLUCOSE: 274 MG/DL (ref 70–110)
POCT GLUCOSE: 277 MG/DL (ref 70–110)
POCT GLUCOSE: 321 MG/DL (ref 70–110)
POTASSIUM SERPL-SCNC: 4.4 MMOL/L
RBC # BLD AUTO: 3.22 M/UL
SODIUM SERPL-SCNC: 140 MMOL/L
TACROLIMUS BLD-MCNC: 10 NG/ML
VANCOMYCIN TROUGH SERPL-MCNC: 12.4 UG/ML
WBC # BLD AUTO: 10.28 K/UL

## 2017-04-17 PROCEDURE — 25000003 PHARM REV CODE 250: Performed by: ANESTHESIOLOGY

## 2017-04-17 PROCEDURE — 83735 ASSAY OF MAGNESIUM: CPT

## 2017-04-17 PROCEDURE — 99232 SBSQ HOSP IP/OBS MODERATE 35: CPT | Mod: ,,, | Performed by: NURSE PRACTITIONER

## 2017-04-17 PROCEDURE — 63600175 PHARM REV CODE 636 W HCPCS: Performed by: INTERNAL MEDICINE

## 2017-04-17 PROCEDURE — 25000003 PHARM REV CODE 250: Performed by: INTERNAL MEDICINE

## 2017-04-17 PROCEDURE — 25000003 PHARM REV CODE 250: Performed by: NURSE PRACTITIONER

## 2017-04-17 PROCEDURE — 80202 ASSAY OF VANCOMYCIN: CPT

## 2017-04-17 PROCEDURE — 20600001 HC STEP DOWN PRIVATE ROOM

## 2017-04-17 PROCEDURE — 80048 BASIC METABOLIC PNL TOTAL CA: CPT

## 2017-04-17 PROCEDURE — 80197 ASSAY OF TACROLIMUS: CPT

## 2017-04-17 PROCEDURE — 85025 COMPLETE CBC W/AUTO DIFF WBC: CPT

## 2017-04-17 PROCEDURE — 99232 SBSQ HOSP IP/OBS MODERATE 35: CPT | Mod: ,,, | Performed by: INTERNAL MEDICINE

## 2017-04-17 RX ORDER — INSULIN ASPART 100 [IU]/ML
3 INJECTION, SOLUTION INTRAVENOUS; SUBCUTANEOUS
Status: DISCONTINUED | OUTPATIENT
Start: 2017-04-17 | End: 2017-04-18 | Stop reason: HOSPADM

## 2017-04-17 RX ORDER — CEFEPIME HYDROCHLORIDE 2 G/50ML
2 INJECTION, SOLUTION INTRAVENOUS
Status: DISCONTINUED | OUTPATIENT
Start: 2017-04-17 | End: 2017-04-18 | Stop reason: HOSPADM

## 2017-04-17 RX ADMIN — INSULIN ASPART 3 UNITS: 100 INJECTION, SOLUTION INTRAVENOUS; SUBCUTANEOUS at 09:04

## 2017-04-17 RX ADMIN — PREDNISONE 5 MG: 5 TABLET ORAL at 09:04

## 2017-04-17 RX ADMIN — SODIUM CHLORIDE 500 ML: 0.9 INJECTION, SOLUTION INTRAVENOUS at 02:04

## 2017-04-17 RX ADMIN — VANCOMYCIN HYDROCHLORIDE 750 MG: 1 INJECTION, POWDER, LYOPHILIZED, FOR SOLUTION INTRAVENOUS at 03:04

## 2017-04-17 RX ADMIN — DIPHENHYDRAMINE HYDROCHLORIDE 50 MG: 50 INJECTION, SOLUTION INTRAMUSCULAR; INTRAVENOUS at 09:04

## 2017-04-17 RX ADMIN — CIPROFLOXACIN AND DEXAMETHASONE 4 DROP: 3; 1 SUSPENSION/ DROPS AURICULAR (OTIC) at 09:04

## 2017-04-17 RX ADMIN — PROMETHAZINE HYDROCHLORIDE 25 MG: 25 INJECTION, SOLUTION INTRAMUSCULAR; INTRAVENOUS at 07:04

## 2017-04-17 RX ADMIN — DAPSONE 100 MG: 100 TABLET ORAL at 09:04

## 2017-04-17 RX ADMIN — OXYCODONE HYDROCHLORIDE AND ACETAMINOPHEN 1 TABLET: 10; 325 TABLET ORAL at 09:04

## 2017-04-17 RX ADMIN — PROMETHAZINE HYDROCHLORIDE 25 MG: 25 INJECTION, SOLUTION INTRAMUSCULAR; INTRAVENOUS at 10:04

## 2017-04-17 RX ADMIN — PANTOPRAZOLE SODIUM 40 MG: 40 TABLET, DELAYED RELEASE ORAL at 09:04

## 2017-04-17 RX ADMIN — ARIPIPRAZOLE 2 MG: 2 TABLET ORAL at 09:04

## 2017-04-17 RX ADMIN — TACROLIMUS 3.5 MG: 1 CAPSULE ORAL at 09:04

## 2017-04-17 RX ADMIN — CEFEPIME HYDROCHLORIDE 2 G: 2 INJECTION, SOLUTION INTRAVENOUS at 05:04

## 2017-04-17 RX ADMIN — Medication 3 ML: at 06:04

## 2017-04-17 RX ADMIN — VANCOMYCIN HYDROCHLORIDE 750 MG: 1 INJECTION, POWDER, LYOPHILIZED, FOR SOLUTION INTRAVENOUS at 07:04

## 2017-04-17 RX ADMIN — DIPHENHYDRAMINE HYDROCHLORIDE 50 MG: 50 INJECTION, SOLUTION INTRAMUSCULAR; INTRAVENOUS at 02:04

## 2017-04-17 RX ADMIN — DIPHENHYDRAMINE HYDROCHLORIDE 50 MG: 50 INJECTION, SOLUTION INTRAMUSCULAR; INTRAVENOUS at 07:04

## 2017-04-17 RX ADMIN — CEFEPIME HYDROCHLORIDE 2 G: 2 INJECTION, SOLUTION INTRAVENOUS at 09:04

## 2017-04-17 RX ADMIN — MYCOPHENOLATE MOFETIL 500 MG: 250 CAPSULE ORAL at 09:04

## 2017-04-17 RX ADMIN — AZITHROMYCIN 500 MG: 250 TABLET, FILM COATED ORAL at 05:04

## 2017-04-17 RX ADMIN — FLUTICASONE PROPIONATE 2 SPRAY: 50 SPRAY, METERED NASAL at 09:04

## 2017-04-17 RX ADMIN — CEFEPIME HYDROCHLORIDE 2 G: 2 INJECTION, SOLUTION INTRAVENOUS at 01:04

## 2017-04-17 RX ADMIN — GABAPENTIN 300 MG: 300 CAPSULE ORAL at 02:04

## 2017-04-17 RX ADMIN — OXYMETAZOLINE HYDROCHLORIDE 2 SPRAY: 5 SPRAY NASAL at 09:04

## 2017-04-17 RX ADMIN — GABAPENTIN 300 MG: 300 CAPSULE ORAL at 09:04

## 2017-04-17 RX ADMIN — HYDROMORPHONE HYDROCHLORIDE 1.5 MG: 1 INJECTION, SOLUTION INTRAMUSCULAR; INTRAVENOUS; SUBCUTANEOUS at 04:04

## 2017-04-17 RX ADMIN — MIRTAZAPINE 30 MG: 15 TABLET, FILM COATED ORAL at 09:04

## 2017-04-17 RX ADMIN — HYDROMORPHONE HYDROCHLORIDE 1.5 MG: 1 INJECTION, SOLUTION INTRAMUSCULAR; INTRAVENOUS; SUBCUTANEOUS at 02:04

## 2017-04-17 RX ADMIN — INSULIN ASPART 3 UNITS: 100 INJECTION, SOLUTION INTRAVENOUS; SUBCUTANEOUS at 12:04

## 2017-04-17 RX ADMIN — HYDROMORPHONE HYDROCHLORIDE 1.5 MG: 1 INJECTION, SOLUTION INTRAMUSCULAR; INTRAVENOUS; SUBCUTANEOUS at 09:04

## 2017-04-17 RX ADMIN — DIPHENHYDRAMINE HYDROCHLORIDE 50 MG: 50 INJECTION, SOLUTION INTRAMUSCULAR; INTRAVENOUS at 03:04

## 2017-04-17 RX ADMIN — Medication 3 ML: at 10:04

## 2017-04-17 RX ADMIN — HYDROMORPHONE HYDROCHLORIDE 1.5 MG: 1 INJECTION, SOLUTION INTRAMUSCULAR; INTRAVENOUS; SUBCUTANEOUS at 07:04

## 2017-04-17 RX ADMIN — Medication 3 ML: at 02:04

## 2017-04-17 RX ADMIN — TACROLIMUS 3.5 MG: 1 CAPSULE ORAL at 05:04

## 2017-04-17 RX ADMIN — GABAPENTIN 300 MG: 300 CAPSULE ORAL at 05:04

## 2017-04-17 NOTE — PROGRESS NOTES
"[facility]  Endocrinology  Progress Note    Admit Date: 4/15/2017     Reason for Consult: Management of CFRDM, Hyperglycemia     Surgical Procedure and Date: Bilateral Lung trx 2014, Sinus surgery 2017  Diabetes diagnosis year: 3/2017    Home Diabetes Medications:  Toujeo 5 units qam    How often checking glucose at home? 1-3 x day   BG readings on regimen: ~140s  Hypoglycemia on the regimen?  No  Missed doses on regimen?  No    Diabetes Complications include:     Hyperglycemia    Complicating diabetes co morbidities:   Glucocorticoid use       HPI:   Patient is a 27 y.o. female with a diagnosis of CFRDM, chronic sinusitis, Bilateral Lung transplant secondary to CF presents with chronic ethmoidal sinusitis. Endocrinology consulted for blood glucose management.  Patient recently started on Toujeo 5 units qam in 3/2017.         Interval HPI:   Overnight events:  Prandial excursions noted. fbs at goal  Eatin%  Nausea: No  Hypoglycemia and intervention: No  Fever: No  TPN and/or TF: No  If yes, type of TF/TPN and rate: na    /81 (BP Location: Left arm, Patient Position: Lying, BP Method: Automatic)  Pulse 109  Temp 98.5 °F (36.9 °C) (Oral)   Resp 18  Ht 5' 0.5" (1.537 m)  Wt 50.8 kg (112 lb)  SpO2 96%  Breastfeeding? No  BMI 21.51 kg/m2    Labs Reviewed and Include      Recent Labs  Lab 17  0500   *   CALCIUM 8.5*      K 4.4   CO2 28      BUN 27*   CREATININE 1.0     Lab Results   Component Value Date    WBC 10.28 2017    HGB 9.5 (L) 2017    HCT 30.1 (L) 2017    MCV 94 2017     (L) 2017     No results for input(s): TSH, FREET4 in the last 168 hours.  Lab Results   Component Value Date    HGBA1C 4.2 (L) 2016       Nutritional status:   Body mass index is 21.51 kg/(m^2).  Lab Results   Component Value Date    ALBUMIN 4.1 2017    ALBUMIN 4.0 2017    ALBUMIN 4.3 2016     Lab Results   Component " Value Date    PREALBUMIN 15 (L) 05/29/2014    PREALBUMIN 11 (L) 05/09/2014    PREALBUMIN 18 (L) 03/19/2014       Estimated Creatinine Clearance: 61.7 mL/min (based on Cr of 1).    Accu-Checks  Recent Labs      04/15/17   1005  04/15/17   1437  04/15/17   2039  04/15/17   2233  04/16/17   1153  04/16/17   2035   POCTGLUCOSE  185*  337*  74  107  131*  277*       Current Medications and/or Treatments Impacting Glycemic Control  Immunotherapy:  Immunosuppressants     Start     Stop Route Frequency Ordered    04/15/17 0030  mycophenolate capsule 500 mg      -- Oral 2 times daily 04/15/17 0024    04/15/17 0800  tacrolimus capsule 3.5 mg      -- Oral 2 times daily 04/15/17 0024        Steroids:   Hormones     Start     Stop Route Frequency Ordered    04/15/17 0900  predniSONE tablet 5 mg      -- Oral Daily 04/15/17 0024        Pressors:    Autonomic Drugs     None        Hyperglycemia/Diabetes Medications: Antihyperglycemics     Start     Stop Route Frequency Ordered    04/15/17 1023  insulin aspart pen 0-5 Units      -- SubQ Before meals & nightly PRN 04/15/17 0923    04/17/17 1015  insulin aspart pen 3 Units      -- SubQ 3 times daily with meals 04/17/17 1002          ASSESSMENT and PLAN    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  BG goal 140-180 while hospitalized  Increase nov to 3 units + low dose correction, Ac/hs    Discharge recs: ongoing       * Mastoiditis  S/p surgery        Adverse effect of glucocorticoid or synthetic analogue  Increases prandial insulin needs more than basal  Avoid abrupt cessation due to risk of AI    Immunosuppression  Can increase insulin requirements      Lung transplant status, bilateral  Management per Lung transplant        Katherine Baron MD  Endocrinology  [facility], Kari Dutton MD,  have personally taken the history and examined the patient and agree with the resident's note as stated above.

## 2017-04-17 NOTE — ASSESSMENT & PLAN NOTE
BG goal 140-180 while hospitalized  Increase nov to 3 units + low dose correction, Ac/hs    Discharge recs: ongoing

## 2017-04-17 NOTE — PROGRESS NOTES
ENT Progress Note    Subjective:   pain improved overnight, scant right purulent ear drainage.  No visual complaints; no N/V; no HA's; No other acute issues.  Tolerating diet.    Objective:  Temp:  [97.8 °F (36.6 °C)-98.4 °F (36.9 °C)] 98.2 °F (36.8 °C)  Pulse:  [] 101  Resp:  [12-18] 18  SpO2:  [95 %-96 %] 96 %  BP: (124-152)/() 133/96    Exam:   Gen - aao x 3; NAD  Eyes - EOMI  Ears - AD cotton in place; no Mastoid tenderness. AS externl ear and EAC/TM normal per yesterday's exam  Respiratory - No distress, no stridor or dyspnea    Assessment: 27 year old female with history of CF s/p lung transplant with acute right otitis media with adjacent mastoid opacification without coalescence now  status post myringotomy and PE tube on 4/15/17    Plan:  - Continue IV antibiotics until Cx's result (may be sterile given previous ABX @ OSH)  - Ciprodex gtt 4 drops right ear TID x 14 days  - May place cotton ball in external ear prn drainage  - Follow up cultures  - Management of transplant and pulmonary conditions per primary team

## 2017-04-17 NOTE — PROGRESS NOTES
Progress Note - Floor  Lung Transplant      Admit Date: 4/15/2017   LOS: 2 days     SUBJECTIVE:     Follow-up For:  Acute otitis/mastoidistis     No acute events night. Denies shortness of breath or cough.     Review of Systems   Constitutional: Negative for chills, diaphoresis, fever, malaise/fatigue and weight loss.   HENT: Positive for ear pain (improved). Negative for congestion, ear discharge, hearing loss, nosebleeds, sore throat and tinnitus.    Eyes: Negative for blurred vision, double vision, photophobia, pain, discharge and redness.   Respiratory: Negative for cough, hemoptysis, sputum production, shortness of breath, wheezing and stridor.    Cardiovascular: Negative for chest pain, palpitations, orthopnea, claudication, leg swelling and PND.   Gastrointestinal: Negative for abdominal pain, blood in stool, constipation, diarrhea, heartburn, melena, nausea and vomiting.   Genitourinary: Negative for dysuria, flank pain, frequency, hematuria and urgency.   Musculoskeletal: Negative for back pain, falls, joint pain, myalgias and neck pain.   Skin: Negative for itching and rash.   Neurological: Positive for headaches (chronic). Negative for dizziness, tingling, tremors, sensory change, speech change, focal weakness, seizures, loss of consciousness and weakness.   Endo/Heme/Allergies: Negative for environmental allergies and polydipsia. Does not bruise/bleed easily.   Psychiatric/Behavioral: Negative for depression, hallucinations and memory loss. The patient is not nervous/anxious and does not have insomnia.      Scheduled Meds:   aripiprazole  2 mg Oral Daily    azithromycin  500 mg Oral Every Mon, Wed, Fri    ceFEPime (MAXIPIME) IVPB  2 g Intravenous Q8H    ciprofloxacin-dexamethasone 0.3-0.1%  4 drop Right Ear BID    dapsone  100 mg Oral Daily    fluticasone  2 spray Each Nare Daily    gabapentin  300 mg Oral TID    insulin aspart  2 Units Subcutaneous TIDWM    mirtazapine  30 mg Oral QHS     mycophenolate  500 mg Oral BID    ondansetron  4 mg Intravenous Once    oxymetazoline  2 spray Each Nare BID    pantoprazole  40 mg Oral Daily    polyethylene glycol  17 g Oral BID    predniSONE  5 mg Oral Daily    pseudoephedrine  60 mg Oral Daily    sodium chloride 0.9%  3 mL Intravenous Q8H    tacrolimus  3.5 mg Oral BID    vancomycin (VANCOCIN) IVPB  750 mg Intravenous Q12H     Continuous Infusions:   PRN Meds:.acetaminophen, aluminum-magnesium hydroxide-simethicone, butalbital-acetaminophen-caffeine -40 mg, dextrose 50%, dextrose 50%, diphenhydrAMINE, glucagon (human recombinant), glucose, glucose, heparin, porcine (PF), HYDROmorphone, insulin aspart, levalbuterol, lipase-protease-amylase 24,000-76,000-120,000 units, lorazepam, ondansetron, oxycodone-acetaminophen, potassium chloride 10% **AND** potassium chloride 10% **AND** potassium chloride 10%, promethazine (PHENERGAN) IVPB, sodium chloride 0.9%, sodium chloride 0.9%, tizanidine    OBJECTIVE:     Vital Signs (Most Recent)  Temp: 98.5 °F (36.9 °C) (04/17/17 0815)  Pulse: 109 (04/17/17 0815)  Resp: 18 (04/17/17 0815)  BP: 114/81 (04/17/17 0815)  SpO2: 96 % (04/17/17 0815)    Vital Signs Range (Last 24H):  Temp:  [97.8 °F (36.6 °C)-98.5 °F (36.9 °C)]   Pulse:  []   Resp:  [16-18]   BP: (114-152)/()   SpO2:  [95 %-96 %]     I & O (Last 24H):    Intake/Output Summary (Last 24 hours) at 04/17/17 0923  Last data filed at 04/17/17 0530   Gross per 24 hour   Intake             1470 ml   Output                0 ml   Net             1470 ml     Physical Exam   Constitutional: She is oriented to person, place, and time and well-developed, well-nourished, and in no distress. No distress.   HENT:   Head: Normocephalic.   Nose: Nose normal.   Mouth/Throat: Oropharynx is clear and moist. No oropharyngeal exudate.   Ears not examined   Eyes: Conjunctivae and EOM are normal. Pupils are equal, round, and reactive to light. No scleral icterus.    Neck: Normal range of motion. Neck supple. No JVD present. No tracheal deviation present. No thyromegaly present.   Cardiovascular: Normal rate, regular rhythm and intact distal pulses.  Exam reveals no gallop and no friction rub.    No murmur heard.  Pulmonary/Chest: Effort normal. No stridor. No respiratory distress. She has no wheezes. She has no rales. She exhibits no tenderness.   Abdominal: Soft. Bowel sounds are normal. She exhibits no distension and no mass. There is no tenderness. There is no rebound and no guarding.   Musculoskeletal: Normal range of motion. She exhibits no edema.   Lymphadenopathy:     She has no cervical adenopathy.   Neurological: She is alert and oriented to person, place, and time. No cranial nerve deficit. Gait normal. Coordination normal.   Skin: Skin is warm and dry. No rash noted. She is not diaphoretic. No erythema. No pallor.   Psychiatric: Mood and affect normal.     Laboratory:  CBC:    Recent Labs  Lab 04/17/17  0500   WBC 10.28   RBC 3.22*   HGB 9.5*   HCT 30.1*   *   MCV 94   MCH 29.5   MCHC 31.6*     BMP:    Recent Labs  Lab 04/17/17  0500      K 4.4      CO2 28   BUN 27*   CREATININE 1.0   CALCIUM 8.5*      Microbiology Results (last 7 days)     Procedure Component Value Units Date/Time    Aerobic culture [[id number]] Collected:  04/15/17 2237    Order Status:  Completed Specimen:  Abscess from Ear, Right Updated:  04/17/17 0732     Aerobic Bacterial Culture No growth    Culture, Anaerobe [[id number]] Collected:  04/15/17 2237    Order Status:  Completed Specimen:  Abscess from Ear, Right Updated:  04/17/17 0709     Anaerobic Culture Culture in progress    Fungus culture [[id number]] Collected:  04/15/17 2236    Order Status:  Sent Specimen:  Abscess from Ear, Right Updated:  04/15/17 2240    AFB Culture & Smear [[id number]] Collected:  04/15/17 2237    Order Status:  Sent Specimen:  Abscess from Elbow, Right Updated:  04/15/17 2239    Clostridium  difficile EIA [[id number]]     Order Status:  No result Specimen:  Stool from Stool           ASSESSMENT/PLAN:     Active Hospital Problems    Diagnosis  POA    *Mastoiditis [H70.90]  Yes    Acute mucoid otitis media of right ear [H65.111]  Yes    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     1. Acute otitis/mastoiditis - s/p successful myringotomy. Will continue cefepime and vancomycin and follow up on cultures. ENT to follow and will decide on further mastoid interventions. Continue current pain management regimen for now.     2. S/P Bilateral Lung Transplant with severe CARLOS EDUARDO - no suspicion for worsening of disease.     3. Immunosuppression - continue tacrolimus, prednisone ,and mycophenolate    4. Prophylactic antibiotic - continue dapsone    5. CFRDM - appreciate endo recs.    Reyes Will NP  Lung Transplant

## 2017-04-17 NOTE — NURSING
CBG checked per patient request, states she feels her heart beating fast.  321 ~2 hr post prandial

## 2017-04-17 NOTE — PROGRESS NOTES
"Spoke to JOSE Will NP re: patient c/o "my heart feels like it's racing" & on assessment, HR is 130s.  Other VS WNL.  Per NP monitor & call with increased HR or continues for 1 hour  "

## 2017-04-17 NOTE — PLAN OF CARE
"Problem: Patient Care Overview  Goal: Plan of Care Review  Outcome: Ongoing (interventions implemented as appropriate)  - Patient reports pain in R ear is "better but not a lot"  - Continue antibiotic ear drops & per IV  - Bed low & locked, call light in reach, nonslip socks, calls for assist  - Patient reports dizziness due to ear infection & drainage - standby/1x assist  - Dilaudid Q5 hours & Benadryl for itching due to narcotic pain meds  - [facility], states she will eat next meal "around 8", MTI + SSI as indicated      "

## 2017-04-17 NOTE — SUBJECTIVE & OBJECTIVE
"Interval HPI:   Overnight events:  Prandial excursions noted. fbs at goal  Eatin%  Nausea: No  Hypoglycemia and intervention: No  Fever: No  TPN and/or TF: No  If yes, type of TF/TPN and rate: na    /81 (BP Location: Left arm, Patient Position: Lying, BP Method: Automatic)  Pulse 109  Temp 98.5 °F (36.9 °C) (Oral)   Resp 18  Ht 5' 0.5" (1.537 m)  Wt 50.8 kg (112 lb)  SpO2 96%  Breastfeeding? No  BMI 21.51 kg/m2    Labs Reviewed and Include      Recent Labs  Lab 17  0500   *   CALCIUM 8.5*      K 4.4   CO2 28      BUN 27*   CREATININE 1.0     Lab Results   Component Value Date    WBC 10.28 2017    HGB 9.5 (L) 2017    HCT 30.1 (L) 2017    MCV 94 2017     (L) 2017     No results for input(s): TSH, FREET4 in the last 168 hours.  Lab Results   Component Value Date    HGBA1C 4.2 (L) 2016       Nutritional status:   Body mass index is 21.51 kg/(m^2).  Lab Results   Component Value Date    ALBUMIN 4.1 2017    ALBUMIN 4.0 2017    ALBUMIN 4.3 2016     Lab Results   Component Value Date    PREALBUMIN 15 (L) 2014    PREALBUMIN 11 (L) 2014    PREALBUMIN 18 (L) 2014       Estimated Creatinine Clearance: 61.7 mL/min (based on Cr of 1).    Accu-Checks  Recent Labs      04/15/17   1005  04/15/17   1437  04/15/17   2039  04/15/17   2233  17   1153  17   POCTGLUCOSE  185*  337*  74  107  131*  277*       Current Medications and/or Treatments Impacting Glycemic Control  Immunotherapy:  Immunosuppressants     Start     Stop Route Frequency Ordered    04/15/17 0030  mycophenolate capsule 500 mg      -- Oral 2 times daily 04/15/17 0024    04/15/17 0800  tacrolimus capsule 3.5 mg      -- Oral 2 times daily 04/15/17 0024        Steroids:   Hormones     Start     Stop Route Frequency Ordered    04/15/17 0900  predniSONE tablet 5 mg      -- Oral Daily 04/15/17 0024        Pressors:    Autonomic Drugs "     None        Hyperglycemia/Diabetes Medications: Antihyperglycemics     Start     Stop Route Frequency Ordered    04/15/17 1023  insulin aspart pen 0-5 Units      -- SubQ Before meals & nightly PRN 04/15/17 0923    04/17/17 1015  insulin aspart pen 3 Units      -- SubQ 3 times daily with meals 04/17/17 1002

## 2017-04-17 NOTE — PROGRESS NOTES
Admit Note     Met with patient to assess needs. Patient is a 27 y.o. single female, admitted for ear infection and nausea.  Pt is s/p lung txp 6/12/2014.    Patient admitted from ED on 4/15/2017 .  At this time, patient presents as alert and oriented x 4, pleasant, good eye contact, communicative, cooperative and asking and answering questions appropriately.  At this time, patients caregiver was not present.    Household/Family Systems     Patient resides with patient's mother, at [address] [place] [address].  Support system includes Kim Ibarra, mother; Mariel Calero, aunt; Francisca, childhood friend, and Shawn, boyfriend of 7 months.  Patient does not have dependents that are need of being cared for.     Patients primary caregiver is Kim Ibarra, rafael mother, phone number [phone].  Confirmed patients contact information is [phone] (home);     Telephone Information:   Mobile [phone]   .    During admission, patient's caregiver plans to stay at home.  Confirmed patient and patients caregivers do have access to reliable transportation.    Cognitive Status/Learning     Patient reports reading ability as 8th grade and states patient does have some difficulty with short-term memory. Pt stated memory issues are mild short term memory and began post transplant which pt attributes to Prograf. Patient reports patient learns best by visual/auditory/hands-on.   Needed: No.   Highest education level: GED    Vocation/Disability   .  Working for Income: No  If no, reason not working: Patient Choice - Student Full Time/Part Time at [employer] in [place]. Pt reports that she recently dropped her classes this semester due to severe migraines which prevented her from driving to class. Pt reports that she plans to re-enroll in the Fall.   Patient is disabled due to Cystic Fibrosis since 2004.      Adherence     Patient reports a high level of adherence to  patients health care regimen.  Adherence counseling and education provided.  Patient's caregiver verbalizes understanding.    Substance Use    Patient reports the following substance usage.    Tobacco: none, patient denies any use. Pt reports that she spends time on the patio of a bar where all of her friends and boyfriend hang out. Pt states that she is well aware that people smoke at bars, which is why pt states that she stays on the patio. SW expressed concerns due to the fact that smoking is also typically allowed on the patio of most bars as well. Pt stated that she does not need education around this as she has lived with CF for many years and is well aware that she needs to avoid smoke and is able to avoid smoke successfully.   Alcohol: none, patient denies any use. Pt reports that she spends time at a bar with her friends and boyfriend, but only drinks coca cola. Pt states that all of her friends hang out at this bar, hence, this is why she reports that she spends time there.  Illicit Drugs/Non-prescribed Medications: none, patient denies any use. Pt reports that she is on pain medication and sees a pain management specialist.   Patient states clear understanding of the potential impact of substance use.  Substance abstinence/cessation counseling, education and resources provided and reviewed.     Services Utilizing/ADLS    Infusion Service: Prior to admission, patient utilizing? no  Home Health: Prior to admission, patient utilizing? no  DME: Prior to admission, no.     Pulmonary/Cardiac Rehab: Prior to admission, no  Dialysis:  Prior to admission, no  Transplant Specialty Pharmacy:  Prior to admission, yes; Pt uses [facility] (mail-delivery) for transplant medications and [facility] at [address] [place] [address] for non-transplant medications.    Prior to admission, patient reports patient was independent with ADLS and was driving.  Patient reports patient is able to care for self at this  time..  Patient indicates a willingness to care for self once medically cleared to do so.    Insurance/Medications    Insured by   Payor/Plan Subscr  Sex Relation Sub. Ins. ID Effective Group Num   1. MEDICAID - * ELOISE WILLIAM* 1990 Female  [id number] 2/1/15 [facility]                                   [address] [address]        Primary Insurance (for UNOS reporting): Public Insurance - Medicaid  Secondary Insurance (for UNOS reporting): None    Patient reports patient is able to obtain and afford medications at this time and at time of discharge.    Living Will/Healthcare Power of     Patient states patient has a LW and/or HCPA. Pt's mother, Kim William, is pt's HCPA.     Coping/Mental Health    Patient is coping adequately with the aid of  family members.  Patient indicates mental health difficulties and has a history of anxiety but reports to SW that she cannot currently afford to see a therapist as her pain medications are too expensive. Pt reports that she speaks with her friend Francisca and her boyfriend Shawn when she is feeling anxious. Pt reports that she finds their support helpful at this time. Pt states that her anxiety is currently manageable at this time.         Discharge Planning    At time of discharge, patient plans to return to patient's home under the care of Kim William, pt's mother.  Patients mother or boyfriend's mother will transport patient.  Per rounds today, expected discharge date has not been medically determined at this time. Patient and patients caretaker verbalize understanding and are involved in treatment planning and discharge process.    Additional Concerns  Pt requested a discount at the [facility] when she comes for her hysterectomy later this month at [facility] so that her caregiver can have a place to stay. LOKI explained that this is not possible, as 1) there is no transplant discount rate at the  and 2) pt is getting a surgery that is unrelated to  transplant. LOKI recommended pt contact her OBGYN to see if discounts are given and offered to provide pt with a list of local hotels that may offer medical rates. Pt declined.   Patient is being followed for needs, education, resources, information, emotional support, supportive counseling, and for supportive and skilled discharge plan of care.  providing ongoing psychosocial support, education, resources and d/c planning as needed.  SW remains available.

## 2017-04-18 ENCOUNTER — TELEPHONE (OUTPATIENT)
Dept: OTOLARYNGOLOGY | Facility: CLINIC | Age: 27
End: 2017-04-18

## 2017-04-18 ENCOUNTER — TELEPHONE (OUTPATIENT)
Dept: OBSTETRICS AND GYNECOLOGY | Facility: CLINIC | Age: 27
End: 2017-04-18

## 2017-04-18 VITALS
WEIGHT: 112 LBS | OXYGEN SATURATION: 97 % | BODY MASS INDEX: 21.14 KG/M2 | HEIGHT: 61 IN | TEMPERATURE: 98 F | HEART RATE: 110 BPM | DIASTOLIC BLOOD PRESSURE: 97 MMHG | SYSTOLIC BLOOD PRESSURE: 133 MMHG | RESPIRATION RATE: 18 BRPM

## 2017-04-18 LAB
ANION GAP SERPL CALC-SCNC: 8 MMOL/L
BASOPHILS # BLD AUTO: 0.01 K/UL
BASOPHILS NFR BLD: 0.1 %
BUN SERPL-MCNC: 26 MG/DL
CALCIUM SERPL-MCNC: 8.5 MG/DL
CHLORIDE SERPL-SCNC: 105 MMOL/L
CO2 SERPL-SCNC: 24 MMOL/L
CREAT SERPL-MCNC: 1 MG/DL
DIFFERENTIAL METHOD: ABNORMAL
EOSINOPHIL # BLD AUTO: 0.1 K/UL
EOSINOPHIL NFR BLD: 1.2 %
ERYTHROCYTE [DISTWIDTH] IN BLOOD BY AUTOMATED COUNT: 15.1 %
EST. GFR  (AFRICAN AMERICAN): >60 ML/MIN/1.73 M^2
EST. GFR  (NON AFRICAN AMERICAN): >60 ML/MIN/1.73 M^2
GLUCOSE SERPL-MCNC: 125 MG/DL
HCT VFR BLD AUTO: 28.8 %
HGB BLD-MCNC: 9.3 G/DL
LYMPHOCYTES # BLD AUTO: 3.6 K/UL
LYMPHOCYTES NFR BLD: 40.1 %
MAGNESIUM SERPL-MCNC: 1.5 MG/DL
MCH RBC QN AUTO: 30 PG
MCHC RBC AUTO-ENTMCNC: 32.3 %
MCV RBC AUTO: 93 FL
MONOCYTES # BLD AUTO: 0.6 K/UL
MONOCYTES NFR BLD: 6.5 %
NEUTROPHILS # BLD AUTO: 4.7 K/UL
NEUTROPHILS NFR BLD: 51.8 %
PLATELET # BLD AUTO: 126 K/UL
PMV BLD AUTO: 9.5 FL
POCT GLUCOSE: 151 MG/DL (ref 70–110)
POTASSIUM SERPL-SCNC: 4.6 MMOL/L
RBC # BLD AUTO: 3.1 M/UL
SODIUM SERPL-SCNC: 137 MMOL/L
TACROLIMUS BLD-MCNC: 9.4 NG/ML
WBC # BLD AUTO: 9.08 K/UL

## 2017-04-18 PROCEDURE — 63600175 PHARM REV CODE 636 W HCPCS: Performed by: INTERNAL MEDICINE

## 2017-04-18 PROCEDURE — 25000003 PHARM REV CODE 250: Performed by: ANESTHESIOLOGY

## 2017-04-18 PROCEDURE — 80197 ASSAY OF TACROLIMUS: CPT

## 2017-04-18 PROCEDURE — 25000003 PHARM REV CODE 250: Performed by: INTERNAL MEDICINE

## 2017-04-18 PROCEDURE — 99238 HOSP IP/OBS DSCHRG MGMT 30/<: CPT | Mod: ,,, | Performed by: NURSE PRACTITIONER

## 2017-04-18 PROCEDURE — 85025 COMPLETE CBC W/AUTO DIFF WBC: CPT

## 2017-04-18 PROCEDURE — 80048 BASIC METABOLIC PNL TOTAL CA: CPT

## 2017-04-18 PROCEDURE — 83735 ASSAY OF MAGNESIUM: CPT

## 2017-04-18 RX ORDER — LINEZOLID 600 MG/1
600 TABLET, FILM COATED ORAL EVERY 12 HOURS
Qty: 28 TABLET | Refills: 0 | Status: SHIPPED | OUTPATIENT
Start: 2017-04-18 | End: 2017-05-02

## 2017-04-18 RX ORDER — CIPROFLOXACIN AND DEXAMETHASONE 3; 1 MG/ML; MG/ML
4 SUSPENSION/ DROPS AURICULAR (OTIC) 2 TIMES DAILY
Qty: 7.5 ML | Refills: 0 | Status: SHIPPED | OUTPATIENT
Start: 2017-04-18 | End: 2017-06-29 | Stop reason: ALTCHOICE

## 2017-04-18 RX ORDER — CEFEPIME HYDROCHLORIDE 2 G/50ML
2 INJECTION, SOLUTION INTRAVENOUS EVERY 8 HOURS
Qty: 1 EACH | Refills: 0
Start: 2017-04-18 | End: 2017-05-01

## 2017-04-18 RX ADMIN — DIPHENHYDRAMINE HYDROCHLORIDE 50 MG: 50 INJECTION, SOLUTION INTRAMUSCULAR; INTRAVENOUS at 12:04

## 2017-04-18 RX ADMIN — PREDNISONE 5 MG: 5 TABLET ORAL at 08:04

## 2017-04-18 RX ADMIN — HYDROMORPHONE HYDROCHLORIDE 1.5 MG: 1 INJECTION, SOLUTION INTRAMUSCULAR; INTRAVENOUS; SUBCUTANEOUS at 12:04

## 2017-04-18 RX ADMIN — DIPHENHYDRAMINE HYDROCHLORIDE 50 MG: 50 INJECTION, SOLUTION INTRAMUSCULAR; INTRAVENOUS at 05:04

## 2017-04-18 RX ADMIN — OXYCODONE HYDROCHLORIDE AND ACETAMINOPHEN 1 TABLET: 10; 325 TABLET ORAL at 07:04

## 2017-04-18 RX ADMIN — CIPROFLOXACIN AND DEXAMETHASONE 4 DROP: 3; 1 SUSPENSION/ DROPS AURICULAR (OTIC) at 08:04

## 2017-04-18 RX ADMIN — LORAZEPAM 2 MG: 1 TABLET ORAL at 09:04

## 2017-04-18 RX ADMIN — OXYMETAZOLINE HYDROCHLORIDE 2 SPRAY: 5 SPRAY NASAL at 08:04

## 2017-04-18 RX ADMIN — DIPHENHYDRAMINE HYDROCHLORIDE 50 MG: 50 INJECTION, SOLUTION INTRAMUSCULAR; INTRAVENOUS at 10:04

## 2017-04-18 RX ADMIN — INSULIN ASPART 3 UNITS: 100 INJECTION, SOLUTION INTRAVENOUS; SUBCUTANEOUS at 09:04

## 2017-04-18 RX ADMIN — PANTOPRAZOLE SODIUM 40 MG: 40 TABLET, DELAYED RELEASE ORAL at 08:04

## 2017-04-18 RX ADMIN — TACROLIMUS 3.5 MG: 1 CAPSULE ORAL at 07:04

## 2017-04-18 RX ADMIN — PROMETHAZINE HYDROCHLORIDE 25 MG: 25 INJECTION, SOLUTION INTRAMUSCULAR; INTRAVENOUS at 07:04

## 2017-04-18 RX ADMIN — OXYCODONE HYDROCHLORIDE AND ACETAMINOPHEN 1 TABLET: 10; 325 TABLET ORAL at 03:04

## 2017-04-18 RX ADMIN — CEFEPIME HYDROCHLORIDE 2 G: 2 INJECTION, SOLUTION INTRAVENOUS at 12:04

## 2017-04-18 RX ADMIN — VANCOMYCIN HYDROCHLORIDE 750 MG: 1 INJECTION, POWDER, LYOPHILIZED, FOR SOLUTION INTRAVENOUS at 07:04

## 2017-04-18 RX ADMIN — Medication 3 ML: at 06:04

## 2017-04-18 RX ADMIN — ARIPIPRAZOLE 2 MG: 2 TABLET ORAL at 08:04

## 2017-04-18 RX ADMIN — DAPSONE 100 MG: 100 TABLET ORAL at 08:04

## 2017-04-18 RX ADMIN — HYDROMORPHONE HYDROCHLORIDE 1.5 MG: 1 INJECTION, SOLUTION INTRAMUSCULAR; INTRAVENOUS; SUBCUTANEOUS at 10:04

## 2017-04-18 RX ADMIN — CEFEPIME HYDROCHLORIDE 2 G: 2 INJECTION, SOLUTION INTRAVENOUS at 09:04

## 2017-04-18 RX ADMIN — HYDROMORPHONE HYDROCHLORIDE 1.5 MG: 1 INJECTION, SOLUTION INTRAMUSCULAR; INTRAVENOUS; SUBCUTANEOUS at 05:04

## 2017-04-18 RX ADMIN — GABAPENTIN 300 MG: 300 CAPSULE ORAL at 05:04

## 2017-04-18 RX ADMIN — MYCOPHENOLATE MOFETIL 500 MG: 250 CAPSULE ORAL at 08:04

## 2017-04-18 NOTE — PROGRESS NOTES
Pt has been given all of her DC paperwork and information. Any, and all questions have been answered. Ana Ervin RN, met with pt, and her home ABX will be delivered to the address they agreed upon. Pt is waiting on her mother to come and get her.

## 2017-04-18 NOTE — DISCHARGE SUMMARY
[facility]  General Surgery  Discharge Summary      Patient Name: Juanita Ibarra  MRN: [id number]  Admission Date: 4/15/2017  Hospital Length of Stay: 3 days  Discharge Date and Time:  04/18/2017 9:35 AM  Attending Physician: Paddy Alvarez MD   Discharging Provider: Reyes Will NP  Primary Care Provider: Paddy Alvarez MD     HPI: Mastoiditis    Procedure(s) (LRB):  MYRINGOTOMY WITH INSERTION OF PE TUBES (Right)     Hospital Course: 1. Acute otitis/mastoiditis - s/p successful myringotomy. Will continue IV cefepime and replace vancomycin with oral Zyvox as outpatient for a full 14 days.  Will follow up with ENT to decide on any further interventions. Continue ear drops per ENT.       2. S/P Bilateral Lung Transplant with severe CARLOS EDUARDO - no suspicion for worsening of disease.      3. Immunosuppression - continue tacrolimus, prednisone ,and mycophenolate     4. Prophylactic antibiotic - continue dapsone     5. CFRDM - appreciate endo recs.       Consults:   Consults         Status Ordering Provider     Inpatient consult to Endocrinology  Once     Provider:  (Not yet assigned)    PADDY Mir     Inpatient consult to ENT  Once     Provider:  Gary Null MD    Completed FRANTZ CABA          Significant Diagnostic Studies: Labs:   BMP:   Recent Labs  Lab 04/17/17  0500 04/18/17  0530   * 125*    137   K 4.4 4.6    105   CO2 28 24   BUN 27* 26*   CREATININE 1.0 1.0   CALCIUM 8.5* 8.5*   MG 1.5* 1.5*   , CMP   Recent Labs  Lab 04/17/17  0500 04/18/17  0530    137   K 4.4 4.6    105   CO2 28 24   * 125*   BUN 27* 26*   CREATININE 1.0 1.0   CALCIUM 8.5* 8.5*   ANIONGAP 8 8   ESTGFRAFRICA >60.0 >60.0   EGFRNONAA >60.0 >60.0    and CBC   Recent Labs  Lab 04/17/17  0500 04/18/17  0530   WBC 10.28 9.08   HGB 9.5* 9.3*   HCT 30.1* 28.8*   * 126*     Microbiology:   Blood Culture   Lab Results   Component Value Date     LABBLOO No growth after 5 days. 01/27/2016   , Sputum Culture   Lab Results   Component Value Date    GSRESP Rare WBC's 12/20/2016    GSRESP No organisms seen 12/20/2016    RESPIRATORYC Normal respiratory anibal 12/20/2016    and Urine Culture    Lab Results   Component Value Date    LABURIN No significant growth 01/28/2016       Pending Diagnostic Studies:     Procedure Component Value Units Date/Time    Tacrolimus level [[id number]] Collected:  04/18/17 0530    Order Status:  Sent Lab Status:  In process Updated:  04/18/17 0611    Specimen:  Blood from Blood     Narrative:       At 5 AM        Final Active Diagnoses:    Diagnosis Date Noted POA    PRINCIPAL PROBLEM:  Mastoiditis [H70.90] 04/14/2017 Yes    Acute mucoid otitis media of right ear [H65.111] 04/16/2017 Yes    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    Specialties:  Intensive Care, Transplant    Contact information:    [address] [facility] [address]  [phone]          Follow up with [facility] .    Specialties:  Pharmacist, DME Provider, IV Infusion    Contact information:    [address] [place] [id number] [phone]          Patient Instructions:     Ambulatory Referral to Infusion Dept   Referral Priority: Routine Referral Type: Consultation   Referral Reason: Specialty Services Required    Referred to Provider: [facility] Requested Specialty: IV Infusion   Number of Visits Requested: 1      Diet general     Activity as tolerated     Call MD for:  increased confusion or weakness     Call MD for:  persistent dizziness, light-headedness, or visual disturbances     Call MD for:  worsening rash     Call MD for:  severe persistent headache     Call MD for:  difficulty breathing or increased cough     Call MD for:  redness, tenderness, or signs of infection (pain, swelling, redness, odor or green/yellow discharge around incision site)     Call MD for:  severe uncontrolled pain     Call MD for:  persistent nausea and vomiting or diarrhea     Call MD for:  temperature >100.4       Medications:  Reconciled Home Medications:   Current Discharge Medication List      START taking these medications    Details   ceFEPIme in dextrose 5% (MAXIPIME) 2 gram/50 mL PgBk Inject 50 mLs (2 g total) into the vein every 8 (eight) hours.  Qty: 1 each, Refills: 0      ciprofloxacin-dexamethasone 0.3-0.1% (CIPRODEX) 0.3-0.1 % DrpS Place 4 drops into the right ear 2 (two) times daily.  Qty: 7.5 mL, Refills: 0      linezolid (ZYVOX) 600 mg Tab Take 1 tablet (600 mg total) by mouth every 12 (twelve) hours.  Qty: 28 tablet, Refills: 0         CONTINUE these medications which have NOT CHANGED    Details   aluminum-magnesium hydroxide-simethicone (MAALOX ADVANCED) 200-200-20 mg/5 mL Susp Take 30 mLs by mouth before meals and at bedtime as needed.  Qty: 148 mL, Refills: 2      aripiprazole (ABILIFY) 2 MG Tab Take 2 mg by mouth once daily.      azithromycin (ZITHROMAX) 500 MG tablet Take 1 tablet (500 mg total) by mouth every Mon, Wed, Fri.  Qty: 15 tablet, Refills: 11      butalbital-acetaminophen-caffeine -40 mg (FIORICET, ESGIC) -40 mg per tablet TAKE 1 TABLET BY MOUTH EVERY 6 HOURS AS NEEDED FOR HEADACHE  Qty: 120 tablet, Refills: 0      calcium-vitamin D3 500 mg(1,250mg) -200 unit per tablet Take 1 tablet by mouth 2 (two) times  daily with meals.  Qty: 60 tablet, Refills: 11    Associated Diagnoses: Osteopenia      CREON 24,000-76,000 -120,000 unit capsule TAKE 5 CAPSULES BY MOUTH WITH MEALS AND 4 CAPSULES WITH SNACKS EVERYDAY  Qty: 810 capsule, Refills: 0    Associated Diagnoses: Cystic fibrosis with gastrointestinal manifestations      dapsone 100 MG Tab TAKE 1 TABLET BY MOUTH EVERY DAY  Qty: 30 tablet, Refills: 11    Associated Diagnoses: Need for pneumocystis prophylaxis      diphenhydrAMINE (BENADRYL) 50 MG tablet Take 1 tablet (50 mg total) by mouth every 6 (six) hours as needed for Itching.  Qty: 1 tablet, Refills: 0      fluticasone (FLONASE) 50 mcg/actuation nasal spray INSERT 2 SPRAY IN EACH NOSTRIL DAILY  Qty: 16 g, Refills: 5      folic acid (FOLVITE) 1 MG tablet TAKE 1 TABLET BY MOUTH EVERY DAY  Qty: 30 tablet, Refills: 11    Associated Diagnoses: Folate deficiency      gabapentin (NEURONTIN) 300 MG capsule Take 1 capsule (300 mg total) by mouth 3 (three) times daily.  Qty: 90 capsule, Refills: 11    Associated Diagnoses: Chronic pain syndrome; Muscle spasm of back      insulin glargine, TOUJEO, (TOUJEO) 300 unit/mL (1.5 mL) InPn pen Inject 5 Units into the skin once daily.      levalbuterol (XOPENEX HFA) 45 mcg/actuation inhaler INHALE 1 TO 2 PUFFS INTO THE LUNGS EVERY 6 HOURS AS NEEDED FOR WHEEZING OR SHORTNESS OF BREATH. USE WITH SPACER.  Qty: 15 g, Refills: 0    Associated Diagnoses: Wheezing      lorazepam (ATIVAN) 2 MG Tab Take 1 tablet (2 mg total) by mouth every 12 (twelve) hours as needed (for anxiety).  Qty: 60 tablet, Refills: 3    Associated Diagnoses: Anxiety      magnesium oxide (MAG-OX) 400 mg tablet Take 1 tablet (400 mg total) by mouth 2 (two) times daily.  Qty: 60 tablet, Refills: 11    Associated Diagnoses: Hypomagnesemia      mirtazapine (REMERON) 15 MG tablet Take 30 mg by mouth every evening. rx'd by PCP       mometasone-formoterol 100-5 mcg/actuation HFAA Inhale 1 puff into the lungs 2 (two) times daily.       montelukast (SINGULAIR) 10 mg tablet TAKE 1 TABLET BY MOUTH EVERY DAY  Qty: 30 tablet, Refills: 11    Associated Diagnoses: Allergic rhinitis      morphine (MS CONTIN) 15 MG 12 hr tablet Take 15 mg by mouth 2 (two) times daily.      mv. min cmb#[id number] (AQUADEKS) 100-700-10 mcg-mcg-mg Cap cap Take 1 capsule by mouth 2 (two) times daily.  Qty: 60 capsule, Refills: 11    Associated Diagnoses: Vitamin deficiency      mycophenolate (CELLCEPT) 250 mg Cap Take 2 capsules (500 mg total) by mouth 2 (two) times daily.  Qty: 120 capsule, Refills: 11    Associated Diagnoses: Lung replaced by transplant      omeprazole (PRILOSEC) 40 MG capsule TAKE 1 CAPSULE BY MOUTH EVERY MORNING  Qty: 30 capsule, Refills: 11    Associated Diagnoses: Gastroesophageal reflux disease without esophagitis      ondansetron (ZOFRAN) 8 MG tablet Take 1 tablet (8 mg total) by mouth every 8 (eight) hours as needed.  Qty: 30 tablet, Refills: 1    Associated Diagnoses: Nausea      oxycodone (OXY-IR) 5 mg Cap 1 capsule (5mg) for pain scale 1-3  2 capsule (10mg) for pain scale 4-6  3 capsule (15 mg) for pain scale 7-10  Every four hours as needed for post-operative pain.  Qty: 60 capsule, Refills: 0      polyethylene glycol (GLYCOLAX) 17 gram PwPk Take 17 g by mouth 2 (two) times daily.  Qty: 60 packet, Refills: 11      predniSONE (DELTASONE) 5 MG tablet Take 5 mg by mouth once daily.      PROCHAMBER USE AS DIRECTED  Qty: 1 Device, Refills: 0    Associated Diagnoses: Uses nebulizer and inhaler at home      promethazine (PHENERGAN) 25 MG tablet TAKE 1 TABLET BY MOUTH EVERY 8 HOURS AS NEEDED FOR NAUSEA  Qty: 45 tablet, Refills: 0      !! tacrolimus (PROGRAF) 0.5 MG Cap Take 1 capsule (0.5 mg total) by mouth every 12 (twelve) hours.  Qty: 60 capsule, Refills: 11    Associated Diagnoses: Lung replaced by transplant      !! tacrolimus (PROGRAF) 1 MG Cap Take 3 capsules (3 mg total) by mouth every 12 (twelve) hours. Daily doses: 3.5 mg every 12  hours.  Qty: 180 capsule, Refills: 11    Associated Diagnoses: Lung replaced by transplant      tizanidine (ZANAFLEX) 4 MG tablet TAKE 2 TABLETS BY MOUTH EVERY 6 HOURS AS NEEDED  Qty: 90 tablet, Refills: 0       !! - Potential duplicate medications found. Please discuss with provider.          Reyes Will NP  Lung Transplant  [facility]

## 2017-04-18 NOTE — DISCHARGE SUMMARY
Discharge Medication Note:    Hospital Course:  Admitted with acute otitis/mastoiditis s/p myringotomy with insertion of PE tubes.  Treated for MRSA and PSA based on previous cultures with vancomycin + cefepime.      Plan:  - Linezolid + cefepime to complete 14 days of therapy.        Met with Juanita Ibarra at discharge to review discharge medications and to update the blue medication card.        Juanita Ibarra   Home Medication Instructions LOLY:[id number]    Printed on:04/18/17 7640   Medication Information                      aluminum-magnesium hydroxide-simethicone (MAALOX ADVANCED) 200-200-20 mg/5 mL Susp  Take 30 mLs by mouth before meals and at bedtime as needed.             aripiprazole (ABILIFY) 2 MG Tab  Take 2 mg by mouth once daily.             azithromycin (ZITHROMAX) 500 MG tablet  Take 1 tablet (500 mg total) by mouth every Mon, Wed, Fri.             butalbital-acetaminophen-caffeine -40 mg (FIORICET, ESGIC) -40 mg per tablet  TAKE 1 TABLET BY MOUTH EVERY 6 HOURS AS NEEDED FOR HEADACHE             calcium-vitamin D3 500 mg(1,250mg) -200 unit per tablet  Take 1 tablet by mouth 2 (two) times daily with meals.             ceFEPIme in dextrose 5% (MAXIPIME) 2 gram/50 mL PgBk  Inject 50 mLs (2 g total) into the vein every 8 (eight) hours.             ciprofloxacin-dexamethasone 0.3-0.1% (CIPRODEX) 0.3-0.1 % DrpS  Place 4 drops into the right ear 2 (two) times daily.             CREON 24,000-76,000 -120,000 unit capsule  TAKE 5 CAPSULES BY MOUTH WITH MEALS AND 4 CAPSULES WITH SNACKS EVERYDAY             dapsone 100 MG Tab  TAKE 1 TABLET BY MOUTH EVERY DAY             diphenhydrAMINE (BENADRYL) 50 MG tablet  Take 1 tablet (50 mg total) by mouth every 6 (six) hours as needed for Itching.             fluticasone (FLONASE) 50 mcg/actuation nasal spray  INSERT 2 SPRAY IN EACH NOSTRIL DAILY             folic acid (FOLVITE) 1 MG tablet  TAKE 1 TABLET BY MOUTH EVERY DAY              gabapentin (NEURONTIN) 300 MG capsule  Take 1 capsule (300 mg total) by mouth 3 (three) times daily.             insulin glargine, TOUJEO, (TOUJEO) 300 unit/mL (1.5 mL) InPn pen  Inject 5 Units into the skin once daily.             levalbuterol (XOPENEX HFA) 45 mcg/actuation inhaler  INHALE 1 TO 2 PUFFS INTO THE LUNGS EVERY 6 HOURS AS NEEDED FOR WHEEZING OR SHORTNESS OF BREATH. USE WITH SPACER.             linezolid (ZYVOX) 600 mg Tab  Take 1 tablet (600 mg total) by mouth every 12 (twelve) hours.             lorazepam (ATIVAN) 2 MG Tab  Take 1 tablet (2 mg total) by mouth every 12 (twelve) hours as needed (for anxiety).             magnesium oxide (MAG-OX) 400 mg tablet  Take 1 tablet (400 mg total) by mouth 2 (two) times daily.             mometasone-formoterol 100-5 mcg/actuation HFAA  Inhale 1 puff into the lungs 2 (two) times daily.             montelukast (SINGULAIR) 10 mg tablet  TAKE 1 TABLET BY MOUTH EVERY DAY             morphine (MS CONTIN) 15 MG 12 hr tablet  Take 15 mg by mouth 2 (two) times daily.             mv. min cmb#[id number] (AQUADEKS) 100-700-10 mcg-mcg-mg Cap cap  Take 1 capsule by mouth 2 (two) times daily.             mycophenolate (CELLCEPT) 250 mg Cap  Take 2 capsules (500 mg total) by mouth 2 (two) times daily.             omeprazole (PRILOSEC) 40 MG capsule  TAKE 1 CAPSULE BY MOUTH EVERY MORNING             ondansetron (ZOFRAN) 8 MG tablet  Take 1 tablet (8 mg total) by mouth every 8 (eight) hours as needed.             oxycodone (OXY-IR) 5 mg Cap  1 capsule (5mg) for pain scale 1-3  2 capsule (10mg) for pain scale 4-6  3 capsule (15 mg) for pain scale 7-10  Every four hours as needed for post-operative pain.             polyethylene glycol (GLYCOLAX) 17 gram PwPk  Take 17 g by mouth 2 (two) times daily.             predniSONE (DELTASONE) 5 MG tablet  Take 5 mg by mouth once daily.             PROCHAMBER  USE AS DIRECTED             promethazine (PHENERGAN) 25 MG tablet  TAKE 1 TABLET  BY MOUTH EVERY 8 HOURS AS NEEDED FOR NAUSEA             tacrolimus (PROGRAF) 0.5 MG Cap  Take 1 capsule (0.5 mg total) by mouth every 12 (twelve) hours.             tacrolimus (PROGRAF) 1 MG Cap  Take 3 capsules (3 mg total) by mouth every 12 (twelve) hours. Daily doses: 3.5 mg every 12 hours.             tizanidine (ZANAFLEX) 4 MG tablet  TAKE 2 TABLETS BY MOUTH EVERY 6 HOURS AS NEEDED                 Pt was provided with prescriptions for the following meds:  E-rx: Linezolid.      Juanita Ibarra verbalized understanding and had the opportunity to ask questions.

## 2017-04-18 NOTE — PROGRESS NOTES
Patient being discharged home today.  Discharge instructions and follow up plan reviewed with her as follows:  1.  Return to the lung transplant clinic for labs, chest xray, PFT and doctor visit to be determined, as our team will coordinate these appointments to coincide with her GYN post-operative follow up schedule (hysterectomy planned for April 25th).  2.  Home antibiotics to treat MRSA and Pseudomonas sinus infections:  Cefepime 2 grams IV via portacath every 8 hours (0000, 0800, 1600) through May 1, 2017 and Zyvox 600 mg oral every 12 hours x 14 days.  Patient opted to use [facility] for home IV medication and supplies.  She will self-infuse the IV medications and perform her own portacath care.  Patient aware that a [facility] nurse liaison will meet with her prior to discharge today.  3.  Call ENT to arrange outpatient follow up with Dr. Olsen, who performed sinus surgery earlier this month, and/or Dr. Null, who managed patient's ENT care during this hospital admission.    Provided verbal and written education re: this information.  The patient asked questions, which were answered to her satisfaction.  She verbalized her understanding of all information discussed and demonstrated her readiness for discharge home.

## 2017-04-18 NOTE — TELEPHONE ENCOUNTER
----- Message from Clayton Cason sent at 4/18/2017  9:13 AM CDT -----  Contact: Patient  x_ 1st Request  _ 2nd Request  _ 3rd Request    Who: LIZA WILLIAM [[id number]]    Why: Patient is calling in regards to scheduling her pre op appointment. Patient is needing a call back.    What Number to Call Back: Patient can be reached at [phone].    When to Expect a call back: (Before the end of the day)  -- if call after 3:00 call back will be tomorrow.

## 2017-04-18 NOTE — PROGRESS NOTES
Discharge Note:    SW met with pt to assess needs for discharge. Pt scheduled to discharge to home with IV antibiotic set up. Pt chose [facility] for IV infusion company. Pt declined HH and states would prefer to infuse self.  LOKI reviewed discharge plan with pt. Pt aware of, and involved in discharge plan. Pt to discharge home with the assistance of mother. Pt reports coping adequately with discharge at this time and was sitting up on bed alert and oriented x 4.  Pt verbalized no additional needs or concerns at this time.  Contact information provided. LOKI to remain available.     LOKI spoke with Ana at [facility] re: referral. Ana ching will follow up with pt and provide teaching.    [facility]   Ph# [phone]/Fx# [phone]

## 2017-04-18 NOTE — PROGRESS NOTES
ENT Progress Note    Subjective: Afebrile. Reports pain is improving, hurts when she eats. Scant drainage from right ear. Reports right ear itches.    Objective:  Temp:  [98.2 °F (36.8 °C)-98.6 °F (37 °C)] 98.4 °F (36.9 °C)  Pulse:  [109-135] 114  Resp:  [17-18] 17  SpO2:  [95 %-96 %] 96 %  BP: (114-153)/() 132/97    NAD, A/Ox3. No increased WOB, normal voice.  CN II-XII intact  AS: Pinna, EAC unremarkable  AD: Pinna unremarkable, not proptotic. No mastoid tenderness or erythema. EAC with scant dried drops, no swelling. PET with some crusting, partially occluded. Suspect persistent effusion.  Nares patent, no drainage  MMM, full tongue ROM. Exquisite TMJ tenderness on the right.  Neck soft    Ear cx 4/15: Aerobic NGTD, No AFB on smear. Anaerobic, AFB, fungal cx pending.  Nasal cx 4/3: MRSA (vanc sens) and pseudomonas (cefepime sens). No AFB on smear. No anaerobes. Fungus and AFB cx pending.    CT TB reviewed from 4/15: Middle ear and mastoid effusion on the right, no coalescence. No edema overlying mastoid.    Assessment: 27F with CF s/p lung transplant with acute right otitis media with adjacent mastoid effusion without coalescence s/p myringotomy and PE tube on 4/15/17. Improving.    Plan:  - Continue IV vanc and cefepime until cultures result, recommend continued treatment for MRSA and pseudomonas. Can consider ID consult for duration of treatment for AOM in the setting of immunosuppression.  - Ciprodex gtt 4 drops right ear TID x 14 days  - Management of transplant and pulmonary conditions per primary team    D/w staff Dr. Null

## 2017-04-19 ENCOUNTER — TELEPHONE (OUTPATIENT)
Dept: OTOLARYNGOLOGY | Facility: CLINIC | Age: 27
End: 2017-04-19

## 2017-04-19 LAB — BACTERIA SPEC AEROBE CULT: NO GROWTH

## 2017-04-19 NOTE — TELEPHONE ENCOUNTER
Spoke with patient who excepted the appointment with DR MAXIMINO HARGROVE  For 5/19/17 she will call if there is any changes in here schedule.

## 2017-04-19 NOTE — TELEPHONE ENCOUNTER
----- Message from Dariana Payton RN sent at 4/19/2017 10:39 AM CDT -----   Good Morning!  See below from Dr. Null. Could you assist with scheduling?  Patient is scheduled already w/ Dr. Olsen for post-op on 5/4, however, I know she lives out of town so maybe try and coordinate a date with [place] if possible.   Thanks kashmir briseno,  Dariana  [phone]    ----- Message -----     From: Gary Null MD     Sent: 4/19/2017  10:20 AM       To: Dariana Payton RN    This patient has CF and is immunosuppressed s/p lung transplant. She had acute otitis media with mastoiditis, requiring hospitalization recently. We put in an ear tube over the weekend and she got better. She will need close otology follow up. Can you please bring her back to see Dr. Zelaya within a 2-4 week timeframe?    Thanks  JM

## 2017-04-24 ENCOUNTER — HOSPITAL ENCOUNTER (OUTPATIENT)
Dept: PREADMISSION TESTING | Facility: OTHER | Age: 27
Discharge: HOME | End: 2017-04-24
Attending: OBSTETRICS & GYNECOLOGY
Payer: MEDICAID

## 2017-04-24 ENCOUNTER — OFFICE VISIT (OUTPATIENT)
Dept: OBSTETRICS AND GYNECOLOGY | Facility: CLINIC | Age: 27
End: 2017-04-24
Payer: MEDICAID

## 2017-04-24 ENCOUNTER — ANESTHESIA EVENT (OUTPATIENT)
Dept: SURGERY | Facility: OTHER | Age: 27
End: 2017-04-24
Payer: MEDICAID

## 2017-04-24 VITALS
BODY MASS INDEX: 23.16 KG/M2 | HEIGHT: 60 IN | HEART RATE: 90 BPM | SYSTOLIC BLOOD PRESSURE: 130 MMHG | WEIGHT: 118 LBS | TEMPERATURE: 98 F | OXYGEN SATURATION: 100 % | DIASTOLIC BLOOD PRESSURE: 84 MMHG | RESPIRATION RATE: 16 BRPM

## 2017-04-24 DIAGNOSIS — Z94.2 LUNG TRANSPLANT STATUS, BILATERAL: ICD-10-CM

## 2017-04-24 DIAGNOSIS — N94.6 DYSMENORRHEA: Primary | ICD-10-CM

## 2017-04-24 LAB — BACTERIA SPEC ANAEROBE CULT: NORMAL

## 2017-04-24 PROCEDURE — 99999 PR PBB SHADOW E&M-EST. PATIENT-LVL I: CPT | Mod: PBBFAC,,, | Performed by: OBSTETRICS & GYNECOLOGY

## 2017-04-24 PROCEDURE — 99211 OFF/OP EST MAY X REQ PHY/QHP: CPT | Mod: PBBFAC | Performed by: OBSTETRICS & GYNECOLOGY

## 2017-04-24 PROCEDURE — 99499 UNLISTED E&M SERVICE: CPT | Mod: S$PBB,,, | Performed by: OBSTETRICS & GYNECOLOGY

## 2017-04-24 RX ORDER — LEVALBUTEROL 1.25 MG/.5ML
1.25 SOLUTION, CONCENTRATE RESPIRATORY (INHALATION)
Status: CANCELLED | OUTPATIENT
Start: 2017-04-25 | End: 2017-04-25

## 2017-04-24 RX ORDER — SODIUM CHLORIDE, SODIUM LACTATE, POTASSIUM CHLORIDE, CALCIUM CHLORIDE 600; 310; 30; 20 MG/100ML; MG/100ML; MG/100ML; MG/100ML
INJECTION, SOLUTION INTRAVENOUS CONTINUOUS
Status: CANCELLED | OUTPATIENT
Start: 2017-04-24

## 2017-04-24 RX ORDER — FAMOTIDINE 20 MG/1
20 TABLET, FILM COATED ORAL
Status: CANCELLED | OUTPATIENT
Start: 2017-04-24 | End: 2017-04-24

## 2017-04-24 RX ORDER — DULOXETIN HYDROCHLORIDE 30 MG/1
30 CAPSULE, DELAYED RELEASE ORAL DAILY
COMMUNITY
End: 2017-11-13 | Stop reason: ALTCHOICE

## 2017-04-24 RX ORDER — MINERAL OIL
180 ENEMA (ML) RECTAL DAILY
COMMUNITY

## 2017-04-24 RX ORDER — LEVALBUTEROL 1.25 MG/.5ML
1.25 SOLUTION, CONCENTRATE RESPIRATORY (INHALATION)
Status: DISCONTINUED | OUTPATIENT
Start: 2017-04-24 | End: 2017-04-25 | Stop reason: HOSPADM

## 2017-04-24 RX ORDER — LEVALBUTEROL 1.25 MG/.5ML
1 SOLUTION, CONCENTRATE RESPIRATORY (INHALATION)
Refills: 0 | OUTPATIENT
Start: 2017-04-24 | End: 2018-04-24

## 2017-04-24 NOTE — DISCHARGE INSTRUCTIONS
PRE-ADMIT TESTING -  [phone]    [address]  [facility]        OUTPATIENT SURGERY UNIT - [phone]    Your surgery has been scheduled at [facility]. We are pleased to have the opportunity to serve you. For Further Information please call [phone].    On the day of surgery please report to the Information Desk on the 1st floor.    CONTACT YOUR PHYSICIAN'S OFFICE THE DAY PRIOR TO YOUR SURGERY TO OBTAIN YOUR ARRIVAL TIME.     The evening before surgery do not eat anything after 9 p.m. ( this includes hard candy, chewing gum and mints).  You may have GATORADE, POWERADE AND WATER FROM 9 p.m. until leaving home to come to the hospital.   DO NOT DRINK ANY LIQUIDS ON THE WAY TO THE HOSPITAL.     SPECIAL MEDICATION INSTRUCTIONS: TAKE medications checked off by the Anesthesiologist on your Medication List.    Angiogram Patients: Take medications as instructed by your physician, including aspirin.     Surgery Patients:    If you take ASPIRIN - Your PHYSICIAN/SURGEON will need to inform you IF/OR when you need to stop taking aspirin prior to your surgery.     Do Not take any medications containing IBUPROFEN.  Do Not Wear any make-up or dark nail polish   (especially eye make-up) to surgery. If you come to surgery with makeup on you will be required to remove the makeup or nail polish.    Do not shave your surgical area at least 5 days prior to your surgery. The surgical prep will be performed at the hospital according to Infection Control regulations.    Leave all valuables at home.   Do Not wear any jewelry or watches, including any metal in body piercings.  Contact Lens must be removed before surgery. Either do not wear the contact lens or bring a case and solution for storage.  Please bring a container for eyeglasses or dentures as required.  Bring any paperwork your physician has provided, such as consent forms,  history and physicals, doctor's orders, etc.   Bring comfortable  clothes that are loose fitting to wear upon discharge. Take into consideration the type of surgery being performed.  Maintain your diet as advised per your physician the day prior to surgery.      Adequate rest the night before surgery is advised.   Park in the Parking lot behind the hospital or in the [facility] across the street from the parking lot. Parking is complimentary.  If you will be discharged the same day as your procedure, please arrange for a responsible adult to drive you home or to accompany you if traveling by taxi.   YOU WILL NOT BE PERMITTED TO DRIVE OR TO LEAVE THE HOSPITAL ALONE AFTER SURGERY.   It is strongly recommended that you arrange for someone to remain with you for the first 24 hrs following your surgery.       Thank you for your cooperation.  The Staff of [facility].        Bathing Instructions                                                                 Please shower the evening before and morning of your procedure with    ANTIBACTERIAL SOAP. ( DIAL, etc )  Concentrate on the surgical area   for at least 3 minutes and rinse completely. Dry off as usual.   Do not use any deodorant, powder, body lotions, perfume, after shave or    cologne.

## 2017-04-24 NOTE — PROGRESS NOTES
ON DEPO AND LUNG TRANSPLANT WANTS HER OFF DUE TO ? ABD SWELLING AND DIFFICULTY BREATHING.  ON DEPO FOR SEVERE CRAMPS WITH MENSES. DOES NOT WANT IUD DUE TO INFECTION AND PAIN ON INSERTION (HAD ONE PREVIOUSLY).  CONSIDERING [facility].     ROS:  GENERAL: No fever, chills, fatigability or weight loss.  VULVAR: No pain, no lesions and no itching.  VAGINAL: No relaxation, no itching, no discharge, no abnormal bleeding and no lesions.  ABDOMEN: No abdominal pain. Denies nausea. Denies vomiting. No diarrhea. No constipation  BREAST: Denies pain. No lumps. No discharge.  URINARY: No incontinence, no nocturia, no frequency and no dysuria.  CARDIOVASCULAR: No chest pain. No shortness of breath. No leg cramps.  NEUROLOGICAL: No headaches. No vision changes.  The remainder of the review of systems was negative.    PE:  General Appearance: normal weight And Well developed. Well nourished. In no acute distress.  Vulva: Lesions: No.  Urethral Meatus: Normal size. Normal location. No lesions. No prolapse.  Urethra: No masses. No tenderness. No prolapse. No scarring.  Bladder: No masses. No tenderness.  Vagina: Mucosa NI:yes discharge no, atrophy no, cystocele no or rectocele no.  Cervix: Lesion: no Stenotic: no Cervical motion tenderness: no  Uterus: Uterus size: 5 weeks. Support good. Uterus size: Normal  Adnexa: Masses: No Tenderness: No CDS Nodularity: No  Abdomen: normal weight No masses. No tenderness.  CHEST: CTA B  HEART: RRR  EXT: NO EDEMA       PROCEDURES:    PLAN:      DIAGNOSIS:  1. Routine gynecological examination    2. Dysmenorrhea         SHE NEEDS IV ABX AND PO ZYVOX IN HOSPITAL.  REQUESTS IV PAIN MEDS POD#0        20 MIN D/W PT AND MOM ON RISKS [facility].

## 2017-04-24 NOTE — ANESTHESIA PREPROCEDURE EVALUATION
04/24/2017  Juanita Ibarra is a 27 y.o., female.    Anesthesia Evaluation    I have reviewed the Patient Summary Reports.    I have reviewed the Nursing Notes.   I have reviewed the Medications.   Prednisone    Review of Systems  Anesthesia Hx:  Hx of Anesthetic complications  History of prior surgery of interest to airway management or planning: (laryngeal reconstruction) lung surgery. Previous anesthesia: General sinus endoscopy done this month: 5.5 ETT used with general anesthesia.  Airway issues documented on chart review include GETA  Denies Family Hx of Anesthesia complications.  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   March PFTs reveal significant pulmonary obstructive disease    Transferred from [place] and Hospitalized earlier this month for emergent myringotomy for otitis/mastoiditis. Discharged after 4 days of antibiotics        Hepatic/GI:   GERD, well controlled Pancreatic insufficiency as result of CF   Musculoskeletal:   chronic back pain as result of muscular injury and rib fractures as result of excessive coughing    Neurological:   Headaches Seizures (years ago, once, drug side effect)   Chronic Pain Syndrome   Endocrine:    "Diabetes, well controlled    Psych:   anxiety          Physical Exam  General:  Well nourished    Airway/Jaw/Neck:  Airway Findings: Mouth Opening: Small, but > 3cm General Airway Assessment: Adult  S/p laryngeal reconstruction  5.5 ETT  Mallampati: III  Improves to II with phonation.  TM Distance: Normal, at least 6 cm  Jaw/Neck Findings:  Neck ROM: Extension Decreased, Mild  Neck Findings:  Short Neck      Dental:  Dental Findings: In tact        Mental Status:  Mental Status Findings: (extremely knowledgeable about medical history)  Cooperative, Alert and Oriented         Anesthesia Plan  Type of Anesthesia, risks & benefits discussed:  Anesthesia Type:  general  Patient's Preference:   Intra-op Monitoring Plan: standard ASA monitors  Intra-op Monitoring Plan Comments:   Post Op Pain Control Plan:   Post Op Pain Control Plan Comments:   Induction:   IV  Beta Blocker:         Informed Consent: Patient understands risks and agrees with Anesthesia plan.  Questions answered. Anesthesia consent signed with patient.  ASA Score: 3     Day of Surgery Review of History & Physical:    H&P update referred to the surgeon.     Anesthesia Plan Notes: Pt would like to have port-a-cath used for induction        Ready For Surgery From Anesthesia Perspective.     "very high drug tolerance developed over the years"  "

## 2017-04-24 NOTE — IP AVS SNAPSHOT
[facility] Location ([place])  [address] [place], [place] [id number]  Phone: [phone]           Patient Discharge Instructions  Our goal is to set you up for success. This packet includes information on your condition, medications, and your home care. It will help you care for yourself to prevent having to return to the hospital.     Please ask your nurse if you have any questions.      There are many details to remember when preparing for your surgery. Here is what you will need to do, please ask your nurse if there are more specific instructions and if you have any questions:    1. Before procedure Do not smoke or drink alcoholic beverages 24 hours prior to your procedure. Do not eat or drink anything 8 hours before your procedure - this includes gum, mints, and candy.     2. Day of procedure Please remove all jewelry for the procedure. If you wear contact lenses, dentures, hearing aids or glasses, bring a container to put them in during your surgery and give to a family member.  If your doctor has scheduled you for an overnight stay, bring a small overnight bag with any personal items that you need.      3. After procedure  Make arrangements in advance for transportation home by a responsible adult. It is not safe to drive a vehicle during the 24 hours following surgery.     PLEASE NOTE: You may be contacted the day before your surgery to confirm your surgery date and arrival time. The Surgery schedule has many variables which may affect the time of your surgery case. Family members should be available if your surgery time changes.           [facility]  Unless otherwise directed by your provider, please   contact [facility], our nurse care line   that is available for 24/7 assistance.     [phone] (toll-free)     Registered nurses in the [facility]   provide: appointment scheduling, clinical advisement, health education, and other advisory services.                  **  Verify the list of medication(s) below is accurate and up to date. Carry this with you in case of emergency. If your medications have changed, please notify your healthcare provider.             Medication List      TAKE these medications        Additional Info                      aluminum-magnesium hydroxide-simethicone 200-200-20 mg/5 mL Susp   Commonly known as:  MAALOX ADVANCED   Quantity:  148 mL   Refills:  2   Dose:  30 mL    Instructions:  Take 30 mLs by mouth before meals and at bedtime as needed.     Begin Date    AM    Noon    PM    Bedtime       aripiprazole 2 MG Tab   Commonly known as:  ABILIFY   Refills:  0   Dose:  2 mg    Instructions:  Take 2 mg by mouth once daily.     Begin Date    AM    Noon    PM    Bedtime       azithromycin 500 MG tablet   Commonly known as:  ZITHROMAX   Quantity:  15 tablet   Refills:  11   Dose:  500 mg    Instructions:  Take 1 tablet (500 mg total) by mouth every Mon, Wed, Fri.     Begin Date    AM    Noon    PM    Bedtime       butalbital-acetaminophen-caffeine -40 mg -40 mg per tablet   Commonly known as:  FIORICET, ESGIC   Quantity:  120 tablet   Refills:  0    Instructions:  TAKE 1 TABLET BY MOUTH EVERY 6 HOURS AS NEEDED FOR HEADACHE     Begin Date    AM    Noon    PM    Bedtime       calcium-vitamin D3 500 mg(1,250mg) -200 unit per tablet   Quantity:  60 tablet   Refills:  11   Dose:  1 tablet    Instructions:  Take 1 tablet by mouth 2 (two) times daily with meals.     Begin Date    AM    Noon    PM    Bedtime       ceFEPIme in dextrose 5% 2 gram/50 mL Pgbk   Commonly known as:  MAXIPIME   Quantity:  1 each   Refills:  0   Dose:  2 g   Indications:  mastoiditis    Instructions:  Inject 50 mLs (2 g total) into the vein every 8 (eight) hours.     Begin Date    AM    Noon    PM    Bedtime       ciprofloxacin-dexamethasone 0.3-0.1% 0.3-0.1 % Drps   Commonly known as:  CIPRODEX   Quantity:  7.5 mL   Refills:  0   Dose:  4 drop    Instructions:  Place 4 drops  into the right ear 2 (two) times daily.     Begin Date    AM    Noon    PM    Bedtime       CREON 24,000-76,000 -120,000 unit capsule   Quantity:  810 capsule   Refills:  0   Generic drug:  lipase-protease-amylase 24,000-76,000-120,000 units    Instructions:  TAKE 5 CAPSULES BY MOUTH WITH MEALS AND 4 CAPSULES WITH SNACKS EVERYDAY     Begin Date    AM    Noon    PM    Bedtime       dapsone 100 MG Tab   Quantity:  30 tablet   Refills:  11    Instructions:  TAKE 1 TABLET BY MOUTH EVERY DAY     Begin Date    AM    Noon    PM    Bedtime       diphenhydrAMINE 50 MG tablet   Commonly known as:  BENADRYL   Quantity:  1 tablet   Refills:  0   Dose:  50 mg    Instructions:  Take 1 tablet (50 mg total) by mouth every 6 (six) hours as needed for Itching.     Begin Date    AM    Noon    PM    Bedtime       duloxetine 30 MG capsule   Commonly known as:  CYMBALTA   Refills:  0   Dose:  30 mg    Instructions:  Take 30 mg by mouth once daily.     Begin Date    AM    Noon    PM    Bedtime       fexofenadine 180 MG tablet   Commonly known as:  ALLEGRA   Refills:  0   Dose:  180 mg    Instructions:  Take 180 mg by mouth once daily.     Begin Date    AM    Noon    PM    Bedtime       folic acid 1 MG tablet   Commonly known as:  FOLVITE   Quantity:  30 tablet   Refills:  11    Instructions:  TAKE 1 TABLET BY MOUTH EVERY DAY     Begin Date    AM    Noon    PM    Bedtime       gabapentin 300 MG capsule   Commonly known as:  NEURONTIN   Quantity:  90 capsule   Refills:  11   Dose:  300 mg    Instructions:  Take 1 capsule (300 mg total) by mouth 3 (three) times daily.     Begin Date    AM    Noon    PM    Bedtime       insulin glargine (TOUJEO) 300 unit/mL (1.5 mL) Inpn pen   Commonly known as:  TOUJEO   Refills:  0   Dose:  5 Units    Instructions:  Inject 5 Units into the skin once daily.     Begin Date    AM    Noon    PM    Bedtime       levalbuterol 45 mcg/actuation inhaler   Commonly known as:  XOPENEX HFA   Quantity:  15 g   Refills:   0    Instructions:  INHALE 1 TO 2 PUFFS INTO THE LUNGS EVERY 6 HOURS AS NEEDED FOR WHEEZING OR SHORTNESS OF BREATH. USE WITH SPACER.     Begin Date    AM    Noon    PM    Bedtime       linezolid 600 mg Tab   Commonly known as:  ZYVOX   Quantity:  28 tablet   Refills:  0   Dose:  600 mg    Instructions:  Take 1 tablet (600 mg total) by mouth every 12 (twelve) hours.     Begin Date    AM    Noon    PM    Bedtime       lorazepam 2 MG Tab   Commonly known as:  ATIVAN   Quantity:  60 tablet   Refills:  3   Dose:  2 mg    Instructions:  Take 1 tablet (2 mg total) by mouth every 12 (twelve) hours as needed (for anxiety).     Begin Date    AM    Noon    PM    Bedtime       magnesium oxide 400 mg tablet   Commonly known as:  MAG-OX   Quantity:  60 tablet   Refills:  11    Instructions:  Take 1 tablet (400 mg total) by mouth 2 (two) times daily.     Begin Date    AM    Noon    PM    Bedtime       mometasone-formoterol 100-5 mcg/actuation Hfaa   Refills:  0   Dose:  1 puff    Instructions:  Inhale 1 puff into the lungs 2 (two) times daily.     Begin Date    AM    Noon    PM    Bedtime       montelukast 10 mg tablet   Commonly known as:  SINGULAIR   Quantity:  30 tablet   Refills:  11    Instructions:  TAKE 1 TABLET BY MOUTH EVERY DAY     Begin Date    AM    Noon    PM    Bedtime       morphine 15 MG 12 hr tablet   Commonly known as:  MS CONTIN   Refills:  0   Dose:  15 mg    Instructions:  Take 15 mg by mouth 2 (two) times daily.     Begin Date    AM    Noon    PM    Bedtime       mv. min cmb#[id number] 100-700-10 mcg-mcg-mg Cap cap   Commonly known as:  AQUADEKS   Quantity:  60 capsule   Refills:  11   Dose:  1 capsule    Instructions:  Take 1 capsule by mouth 2 (two) times daily.     Begin Date    AM    Noon    PM    Bedtime       mycophenolate 250 mg Cap   Commonly known as:  CELLCEPT   Quantity:  120 capsule   Refills:  11   Dose:  500 mg    Instructions:  Take 2 capsules (500 mg total) by mouth 2 (two) times daily.      Begin Date    AM    Noon    PM    Bedtime       omeprazole 40 MG capsule   Commonly known as:  PRILOSEC   Quantity:  30 capsule   Refills:  11    Instructions:  TAKE 1 CAPSULE BY MOUTH EVERY MORNING     Begin Date    AM    Noon    PM    Bedtime       ondansetron 8 MG tablet   Commonly known as:  ZOFRAN   Quantity:  30 tablet   Refills:  1   Dose:  8 mg    Instructions:  Take 1 tablet (8 mg total) by mouth every 8 (eight) hours as needed.     Begin Date    AM    Noon    PM    Bedtime       polyethylene glycol 17 gram Pwpk   Commonly known as:  GLYCOLAX   Quantity:  60 packet   Refills:  11   Dose:  17 g    Instructions:  Take 17 g by mouth 2 (two) times daily.     Begin Date    AM    Noon    PM    Bedtime       predniSONE 5 MG tablet   Commonly known as:  DELTASONE   Refills:  0   Dose:  5 mg    Instructions:  Take 5 mg by mouth once daily.     Begin Date    AM    Noon    PM    Bedtime       PROCHAMBER   Quantity:  1 Device   Refills:  0   Generic drug:  inhalation spacing device    Instructions:  USE AS DIRECTED     Begin Date    AM    Noon    PM    Bedtime       promethazine 25 MG tablet   Commonly known as:  PHENERGAN   Quantity:  45 tablet   Refills:  0    Instructions:  TAKE 1 TABLET BY MOUTH EVERY 8 HOURS AS NEEDED FOR NAUSEA     Begin Date    AM    Noon    PM    Bedtime       * tacrolimus 1 MG Cap   Commonly known as:  PROGRAF   Quantity:  180 capsule   Refills:  11   Dose:  3 mg    Instructions:  Take 3 capsules (3 mg total) by mouth every 12 (twelve) hours. Daily doses: 3.5 mg every 12 hours.     Begin Date    AM    Noon    PM    Bedtime       * tacrolimus 0.5 MG Cap   Commonly known as:  PROGRAF   Quantity:  60 capsule   Refills:  11   Dose:  0.5 mg    Instructions:  Take 1 capsule (0.5 mg total) by mouth every 12 (twelve) hours.     Begin Date    AM    Noon    PM    Bedtime       tizanidine 4 MG tablet   Commonly known as:  ZANAFLEX   Quantity:  90 tablet   Refills:  0    Instructions:  TAKE 2 TABLETS BY  MOUTH EVERY 6 HOURS AS NEEDED     Begin Date    AM    Noon    PM    Bedtime       * Notice:  This list has 2 medication(s) that are the same as other medications prescribed for you. Read the directions carefully, and ask your doctor or other care provider to review them with you.               Please bring to all follow up appointments:    1. A copy of your discharge instructions.  2. All medicines you are currently taking in their original bottles.  3. Identification and insurance card.    Please arrive 15 minutes ahead of scheduled appointment time.    Please call 24 hours in advance if you must reschedule your appointment and/or time.        Your Scheduled Appointments     May 04, 2017  8:30 AM CDT   Post OP with MD Dawson [facility] - Otorhinolaryngology ([facility] )    [address]  [place] [address] [phone]            May 19, 2017  1:30 PM CDT   Audiogram with AUDIOGRAM, AUDIO   [facility] - Audiology ([facility] )    [address]  [place] [address] [phone]            May 19, 2017  2:15 PM CDT   Established Patient Visit with MD Dawson [facility] - Otorhinolaryngology ([facility] )    [address]  [place] [address] [phone]              Your Future Surgeries/Procedures     Apr 25, 2017   Surgery with Deny Dias Jr., MD   [facility] ([facility])    [facility] [id number] [phone]                  Discharge Instructions       PRE-ADMIT TESTING -  [phone]    [address] [place] [address]        OUTPATIENT SURGERY UNIT - [phone]    Your surgery has been scheduled at [facility]. We are pleased to have the opportunity to serve you. For Further Information please call [phone].    On the day of surgery please report to the Information Desk on the 1st floor.    CONTACT YOUR PHYSICIAN'S OFFICE THE  DAY PRIOR TO YOUR SURGERY TO OBTAIN YOUR ARRIVAL TIME.     The evening before surgery do not eat anything after 9 p.m. ( this includes hard candy, chewing gum and mints).  You may have GATORADE, POWERADE AND WATER FROM 9 p.m. until leaving home to come to the hospital.   DO NOT DRINK ANY LIQUIDS ON THE WAY TO THE HOSPITAL.     SPECIAL MEDICATION INSTRUCTIONS: TAKE medications checked off by the Anesthesiologist on your Medication List.    Angiogram Patients: Take medications as instructed by your physician, including aspirin.     Surgery Patients:    If you take ASPIRIN - Your PHYSICIAN/SURGEON will need to inform you IF/OR when you need to stop taking aspirin prior to your surgery.     Do Not take any medications containing IBUPROFEN.  Do Not Wear any make-up or dark nail polish   (especially eye make-up) to surgery. If you come to surgery with makeup on you will be required to remove the makeup or nail polish.    Do not shave your surgical area at least 5 days prior to your surgery. The surgical prep will be performed at the hospital according to Infection Control regulations.    Leave all valuables at home.   Do Not wear any jewelry or watches, including any metal in body piercings.  Contact Lens must be removed before surgery. Either do not wear the contact lens or bring a case and solution for storage.  Please bring a container for eyeglasses or dentures as required.  Bring any paperwork your physician has provided, such as consent forms,  history and physicals, doctor's orders, etc.   Bring comfortable clothes that are loose fitting to wear upon discharge. Take into consideration the type of surgery being performed.  Maintain your diet as advised per your physician the day prior to surgery.      Adequate rest the night before surgery is advised.   Park in the Parking lot behind the hospital or in the [facility] across the street from the parking lot. Parking is complimentary.  If you will be  discharged the same day as your procedure, please arrange for a responsible adult to drive you home or to accompany you if traveling by taxi.   YOU WILL NOT BE PERMITTED TO DRIVE OR TO LEAVE THE HOSPITAL ALONE AFTER SURGERY.   It is strongly recommended that you arrange for someone to remain with you for the first 24 hrs following your surgery.       Thank you for your cooperation.  The Staff of [facility].        Bathing Instructions                                                                 Please shower the evening before and morning of your procedure with    ANTIBACTERIAL SOAP. ( DIAL, etc )  Concentrate on the surgical area   for at least 3 minutes and rinse completely. Dry off as usual.   Do not use any deodorant, powder, body lotions, perfume, after shave or    cologne.                                                Admission Information     Date & Time Provider Department CSN    4/24/2017  8:30 AM Deny Dias Jr., MD [facility] [id number]      Care Providers     Provider Role Specialty Primary office phone    Deny Dias Jr., MD Attending Provider Obstetrics [phone]      Your Vitals Were     BP Pulse Temp Resp Height Weight    130/84 90 98.4 °F (36.9 °C) 16 5' (1.524 m) 53.5 kg (118 lb)    SpO2 BMI             100% 23.05 kg/m2         Recent Lab Values        6/29/2014 8/11/2014 5/19/2016                     4:30 AM  3:09 PM  8:47 AM         A1C 4.0 (L) 4.6 4.2 (L)                   Allergies as of 4/24/2017        Reactions    Albuterol Palpitations    Colistin Anaphylaxis    Vancomycin Analogues     Infusion reaction that does not resolve with slowing    Neupogen [Filgrastim] Other (See Comments)    Ostealgia after five daily doses of 300 mcg.      Bactrim [Sulfamethoxazole-trimethoprim] Hives    Ceftazidime (Anhydrous) Hives    Pt stated can tolerate cefapine not ceftazidime    Dronabinol Other (See Comments)    Mental changes/hallucinations     Haldol [Haloperidol Lactate] Other (See Comments)    Seizure like activity    Nsaids (Non-steroidal Anti-inflammatory Drug)     Cannot have due to lung transplant    Adhesive Rash    Cloth tape- please use tegaderm or paper tape    Aztreonam Rash    Ciprofloxacin Nausea And Vomiting    Projectile N/V, per patient.  Unwilling to retry therapy.      Advance Directives     An advance directive is a document which, in the event you are no longer able to make decisions for yourself, tells your healthcare team what kind of treatment you do or do not want to receive, or who you would like to make those decisions for you.  If you do not currently have an advance directive, [facility] encourages you to create one.  For more information call:  [phone]), [phone] ([phone]),  or log on to [email or web address].        Language Assistance Services     ATTENTION: Language assistance services are available, free of charge. Please call [phone].      ATENCIÓN: Si habla español, tiene a odom disposición servicios gratuitos de asistencia lingüística. Llame al [phone].     CHÚ Ý: N?u b?n nói Ti?ng Vi?t, có các d?ch v? h? tr? ngôn ng? mi?n phí dành cho b?n. G?i s? [phone].        Pneumonmia Discharge Instructions                Diabetes Discharge Instructions                                    [facility] complies with applicable Federal civil rights laws and does not discriminate on the basis of race, color, national origin, age, disability, or sex.

## 2017-04-25 ENCOUNTER — ANESTHESIA (OUTPATIENT)
Dept: SURGERY | Facility: OTHER | Age: 27
End: 2017-04-25
Payer: MEDICAID

## 2017-04-25 ENCOUNTER — HOSPITAL ENCOUNTER (OUTPATIENT)
Facility: OTHER | Age: 27
Discharge: HOME | End: 2017-04-26
Attending: OBSTETRICS & GYNECOLOGY | Admitting: OBSTETRICS & GYNECOLOGY
Payer: MEDICAID

## 2017-04-25 DIAGNOSIS — Z90.710 S/P HYSTERECTOMY: ICD-10-CM

## 2017-04-25 DIAGNOSIS — N94.6 DYSMENORRHEA: Primary | ICD-10-CM

## 2017-04-25 LAB
ABO + RH BLD: NORMAL
ALLENS TEST: ABNORMAL
B-HCG UR QL: NEGATIVE
BLD GP AB SCN CELLS X3 SERPL QL: NORMAL
CTP QC/QA: YES
DELSYS: ABNORMAL
ERYTHROCYTE [SEDIMENTATION RATE] IN BLOOD BY WESTERGREN METHOD: 18 MM/H
FIO2: 32
FLOW: 3
HCO3 UR-SCNC: 21.4 MMOL/L (ref 24–28)
MODE: ABNORMAL
PCO2 BLDA: 40.6 MMHG (ref 35–45)
PH SMN: 7.33 [PH] (ref 7.35–7.45)
PO2 BLDA: 143 MMHG (ref 80–100)
POC BE: -5 MMOL/L
POC SATURATED O2: 99 % (ref 95–100)
POCT GLUCOSE: 200 MG/DL (ref 70–110)
POCT GLUCOSE: 93 MG/DL (ref 70–110)
SAMPLE: ABNORMAL
SITE: ABNORMAL
SP02: 100

## 2017-04-25 PROCEDURE — 94799 UNLISTED PULMONARY SVC/PX: CPT

## 2017-04-25 PROCEDURE — 63600175 PHARM REV CODE 636 W HCPCS: Performed by: NURSE ANESTHETIST, CERTIFIED REGISTERED

## 2017-04-25 PROCEDURE — 86900 BLOOD TYPING SEROLOGIC ABO: CPT

## 2017-04-25 PROCEDURE — 86901 BLOOD TYPING SEROLOGIC RH(D): CPT

## 2017-04-25 PROCEDURE — 36000712 HC OR TIME LEV V 1ST 15 MIN: Performed by: OBSTETRICS & GYNECOLOGY

## 2017-04-25 PROCEDURE — 63600175 PHARM REV CODE 636 W HCPCS: Performed by: ANESTHESIOLOGY

## 2017-04-25 PROCEDURE — 25000003 PHARM REV CODE 250: Performed by: ANESTHESIOLOGY

## 2017-04-25 PROCEDURE — 27000221 HC OXYGEN, UP TO 24 HOURS

## 2017-04-25 PROCEDURE — 88307 TISSUE EXAM BY PATHOLOGIST: CPT | Performed by: PATHOLOGY

## 2017-04-25 PROCEDURE — 82803 BLOOD GASES ANY COMBINATION: CPT

## 2017-04-25 PROCEDURE — 58570 TLH UTERUS 250 G OR LESS: CPT | Mod: AS,,, | Performed by: NURSE PRACTITIONER

## 2017-04-25 PROCEDURE — 37000008 HC ANESTHESIA 1ST 15 MINUTES: Performed by: OBSTETRICS & GYNECOLOGY

## 2017-04-25 PROCEDURE — 27800505 HC CATH, RADIAL ARTERY KIT: Performed by: NURSE ANESTHETIST, CERTIFIED REGISTERED

## 2017-04-25 PROCEDURE — 25000003 PHARM REV CODE 250: Performed by: OBSTETRICS & GYNECOLOGY

## 2017-04-25 PROCEDURE — 88307 TISSUE EXAM BY PATHOLOGIST: CPT | Mod: 26,,, | Performed by: PATHOLOGY

## 2017-04-25 PROCEDURE — 58570 TLH UTERUS 250 G OR LESS: CPT | Mod: ,,, | Performed by: OBSTETRICS & GYNECOLOGY

## 2017-04-25 PROCEDURE — 63600175 PHARM REV CODE 636 W HCPCS: Performed by: OBSTETRICS & GYNECOLOGY

## 2017-04-25 PROCEDURE — 94640 AIRWAY INHALATION TREATMENT: CPT

## 2017-04-25 PROCEDURE — 71000033 HC RECOVERY, INTIAL HOUR: Performed by: OBSTETRICS & GYNECOLOGY

## 2017-04-25 PROCEDURE — 71000039 HC RECOVERY, EACH ADD'L HOUR: Performed by: OBSTETRICS & GYNECOLOGY

## 2017-04-25 PROCEDURE — 37000009 HC ANESTHESIA EA ADD 15 MINS: Performed by: OBSTETRICS & GYNECOLOGY

## 2017-04-25 PROCEDURE — 25000003 PHARM REV CODE 250: Performed by: NURSE ANESTHETIST, CERTIFIED REGISTERED

## 2017-04-25 PROCEDURE — 36000713 HC OR TIME LEV V EA ADD 15 MIN: Performed by: OBSTETRICS & GYNECOLOGY

## 2017-04-25 PROCEDURE — 99900035 HC TECH TIME PER 15 MIN (STAT)

## 2017-04-25 PROCEDURE — 94770 HC EXHALED C02 TEST: CPT | Mod: 59

## 2017-04-25 PROCEDURE — 81025 URINE PREGNANCY TEST: CPT | Performed by: OBSTETRICS & GYNECOLOGY

## 2017-04-25 PROCEDURE — 27201423 OPTIME MED/SURG SUP & DEVICES STERILE SUPPLY: Performed by: OBSTETRICS & GYNECOLOGY

## 2017-04-25 PROCEDURE — 37799 UNLISTED PX VASCULAR SURGERY: CPT

## 2017-04-25 RX ORDER — PROPOFOL 10 MG/ML
VIAL (ML) INTRAVENOUS CONTINUOUS PRN
Status: DISCONTINUED | OUTPATIENT
Start: 2017-04-25 | End: 2017-04-25

## 2017-04-25 RX ORDER — ARIPIPRAZOLE 2 MG/1
2 TABLET ORAL DAILY
Status: DISCONTINUED | OUTPATIENT
Start: 2017-04-26 | End: 2017-04-26 | Stop reason: HOSPADM

## 2017-04-25 RX ORDER — LIDOCAINE HYDROCHLORIDE 10 MG/ML
INJECTION, SOLUTION INTRAVENOUS
Status: DISCONTINUED | OUTPATIENT
Start: 2017-04-25 | End: 2017-04-25

## 2017-04-25 RX ORDER — GABAPENTIN 300 MG/1
600 CAPSULE ORAL 3 TIMES DAILY
Status: DISCONTINUED | OUTPATIENT
Start: 2017-04-25 | End: 2017-04-26 | Stop reason: HOSPADM

## 2017-04-25 RX ORDER — AZITHROMYCIN 250 MG/1
500 TABLET, FILM COATED ORAL
Status: DISCONTINUED | OUTPATIENT
Start: 2017-04-26 | End: 2017-04-26 | Stop reason: HOSPADM

## 2017-04-25 RX ORDER — DAPSONE 25 MG/1
100 TABLET ORAL DAILY
Status: DISCONTINUED | OUTPATIENT
Start: 2017-04-26 | End: 2017-04-26 | Stop reason: HOSPADM

## 2017-04-25 RX ORDER — FENTANYL CITRATE 50 UG/ML
INJECTION, SOLUTION INTRAMUSCULAR; INTRAVENOUS
Status: DISCONTINUED | OUTPATIENT
Start: 2017-04-25 | End: 2017-04-25

## 2017-04-25 RX ORDER — HYDROMORPHONE HCL IN 0.9% NACL 6 MG/30 ML
PATIENT CONTROLLED ANALGESIA SYRINGE INTRAVENOUS CONTINUOUS
Status: DISCONTINUED | OUTPATIENT
Start: 2017-04-25 | End: 2017-04-25

## 2017-04-25 RX ORDER — OXYCODONE HYDROCHLORIDE 5 MG/1
5 TABLET ORAL
Status: DISCONTINUED | OUTPATIENT
Start: 2017-04-25 | End: 2017-04-25 | Stop reason: HOSPADM

## 2017-04-25 RX ORDER — SIMETHICONE 80 MG
80 TABLET,CHEWABLE ORAL EVERY 4 HOURS PRN
Status: DISCONTINUED | OUTPATIENT
Start: 2017-04-25 | End: 2017-04-26 | Stop reason: HOSPADM

## 2017-04-25 RX ORDER — LEVALBUTEROL 1.25 MG/.5ML
1.25 SOLUTION, CONCENTRATE RESPIRATORY (INHALATION)
Status: COMPLETED | OUTPATIENT
Start: 2017-04-25 | End: 2017-04-25

## 2017-04-25 RX ORDER — MONTELUKAST SODIUM 10 MG/1
10 TABLET ORAL DAILY
Status: DISCONTINUED | OUTPATIENT
Start: 2017-04-26 | End: 2017-04-26 | Stop reason: HOSPADM

## 2017-04-25 RX ORDER — GLUCAGON 1 MG
1 KIT INJECTION
Status: DISCONTINUED | OUTPATIENT
Start: 2017-04-25 | End: 2017-04-26 | Stop reason: HOSPADM

## 2017-04-25 RX ORDER — SODIUM CHLORIDE, SODIUM LACTATE, POTASSIUM CHLORIDE, CALCIUM CHLORIDE 600; 310; 30; 20 MG/100ML; MG/100ML; MG/100ML; MG/100ML
INJECTION, SOLUTION INTRAVENOUS CONTINUOUS
Status: DISCONTINUED | OUTPATIENT
Start: 2017-04-25 | End: 2017-04-26 | Stop reason: HOSPADM

## 2017-04-25 RX ORDER — SODIUM CHLORIDE 0.9 % (FLUSH) 0.9 %
3 SYRINGE (ML) INJECTION
Status: DISCONTINUED | OUTPATIENT
Start: 2017-04-25 | End: 2017-04-25

## 2017-04-25 RX ORDER — INSULIN ASPART 100 [IU]/ML
1-10 INJECTION, SOLUTION INTRAVENOUS; SUBCUTANEOUS
Status: DISCONTINUED | OUTPATIENT
Start: 2017-04-25 | End: 2017-04-26 | Stop reason: HOSPADM

## 2017-04-25 RX ORDER — PREDNISONE 5 MG/1
5 TABLET ORAL DAILY
Status: DISCONTINUED | OUTPATIENT
Start: 2017-04-26 | End: 2017-04-26 | Stop reason: HOSPADM

## 2017-04-25 RX ORDER — MIDAZOLAM HYDROCHLORIDE 1 MG/ML
INJECTION INTRAMUSCULAR; INTRAVENOUS
Status: DISCONTINUED | OUTPATIENT
Start: 2017-04-25 | End: 2017-04-25

## 2017-04-25 RX ORDER — SODIUM CHLORIDE, SODIUM LACTATE, POTASSIUM CHLORIDE, CALCIUM CHLORIDE 600; 310; 30; 20 MG/100ML; MG/100ML; MG/100ML; MG/100ML
INJECTION, SOLUTION INTRAVENOUS CONTINUOUS PRN
Status: DISCONTINUED | OUTPATIENT
Start: 2017-04-25 | End: 2017-04-25

## 2017-04-25 RX ORDER — ONDANSETRON 2 MG/ML
INJECTION INTRAMUSCULAR; INTRAVENOUS
Status: DISCONTINUED | OUTPATIENT
Start: 2017-04-25 | End: 2017-04-25

## 2017-04-25 RX ORDER — GLYCOPYRROLATE 0.2 MG/ML
INJECTION INTRAMUSCULAR; INTRAVENOUS
Status: DISCONTINUED | OUTPATIENT
Start: 2017-04-25 | End: 2017-04-25

## 2017-04-25 RX ORDER — ONDANSETRON 8 MG/1
8 TABLET, ORALLY DISINTEGRATING ORAL EVERY 8 HOURS PRN
Status: DISCONTINUED | OUTPATIENT
Start: 2017-04-25 | End: 2017-04-26

## 2017-04-25 RX ORDER — ONDANSETRON 2 MG/ML
4 INJECTION INTRAMUSCULAR; INTRAVENOUS ONCE AS NEEDED
Status: DISCONTINUED | OUTPATIENT
Start: 2017-04-25 | End: 2017-04-25 | Stop reason: HOSPADM

## 2017-04-25 RX ORDER — CLINDAMYCIN PHOSPHATE 900 MG/50ML
INJECTION, SOLUTION INTRAVENOUS
Status: DISCONTINUED | OUTPATIENT
Start: 2017-04-25 | End: 2017-04-25

## 2017-04-25 RX ORDER — TACROLIMUS 0.5 MG/1
0.5 CAPSULE ORAL 2 TIMES DAILY
Status: DISCONTINUED | OUTPATIENT
Start: 2017-04-25 | End: 2017-04-26 | Stop reason: HOSPADM

## 2017-04-25 RX ORDER — DULOXETIN HYDROCHLORIDE 30 MG/1
30 CAPSULE, DELAYED RELEASE ORAL DAILY
Status: DISCONTINUED | OUTPATIENT
Start: 2017-04-26 | End: 2017-04-26 | Stop reason: HOSPADM

## 2017-04-25 RX ORDER — ROCURONIUM BROMIDE 10 MG/ML
INJECTION, SOLUTION INTRAVENOUS
Status: DISCONTINUED | OUTPATIENT
Start: 2017-04-25 | End: 2017-04-25

## 2017-04-25 RX ORDER — MEPERIDINE HYDROCHLORIDE 50 MG/ML
12.5 INJECTION INTRAMUSCULAR; INTRAVENOUS; SUBCUTANEOUS ONCE AS NEEDED
Status: DISCONTINUED | OUTPATIENT
Start: 2017-04-25 | End: 2017-04-25 | Stop reason: HOSPADM

## 2017-04-25 RX ORDER — IBUPROFEN 200 MG
16 TABLET ORAL
Status: DISCONTINUED | OUTPATIENT
Start: 2017-04-25 | End: 2017-04-26 | Stop reason: HOSPADM

## 2017-04-25 RX ORDER — METOPROLOL TARTRATE 1 MG/ML
INJECTION, SOLUTION INTRAVENOUS
Status: DISCONTINUED | OUTPATIENT
Start: 2017-04-25 | End: 2017-04-25

## 2017-04-25 RX ORDER — NEOSTIGMINE METHYLSULFATE 1 MG/ML
INJECTION, SOLUTION INTRAVENOUS
Status: DISCONTINUED | OUTPATIENT
Start: 2017-04-25 | End: 2017-04-25

## 2017-04-25 RX ORDER — IBUPROFEN 200 MG
24 TABLET ORAL
Status: DISCONTINUED | OUTPATIENT
Start: 2017-04-25 | End: 2017-04-26 | Stop reason: HOSPADM

## 2017-04-25 RX ORDER — DIPHENHYDRAMINE HYDROCHLORIDE 50 MG/ML
25 INJECTION INTRAMUSCULAR; INTRAVENOUS EVERY 4 HOURS PRN
Status: DISCONTINUED | OUTPATIENT
Start: 2017-04-25 | End: 2017-04-25

## 2017-04-25 RX ORDER — PROMETHAZINE HYDROCHLORIDE 25 MG/ML
INJECTION, SOLUTION INTRAMUSCULAR; INTRAVENOUS
Status: DISCONTINUED | OUTPATIENT
Start: 2017-04-25 | End: 2017-04-25

## 2017-04-25 RX ORDER — MYCOPHENOLATE MOFETIL 250 MG/1
500 CAPSULE ORAL 2 TIMES DAILY
Status: DISCONTINUED | OUTPATIENT
Start: 2017-04-25 | End: 2017-04-26 | Stop reason: HOSPADM

## 2017-04-25 RX ORDER — FENTANYL CITRATE 50 UG/ML
25 INJECTION, SOLUTION INTRAMUSCULAR; INTRAVENOUS EVERY 5 MIN PRN
Status: DISCONTINUED | OUTPATIENT
Start: 2017-04-25 | End: 2017-04-25 | Stop reason: HOSPADM

## 2017-04-25 RX ORDER — HYDRALAZINE HYDROCHLORIDE 20 MG/ML
INJECTION INTRAMUSCULAR; INTRAVENOUS
Status: DISCONTINUED | OUTPATIENT
Start: 2017-04-25 | End: 2017-04-25

## 2017-04-25 RX ORDER — SODIUM CHLORIDE 0.9 % (FLUSH) 0.9 %
3 SYRINGE (ML) INJECTION EVERY 8 HOURS
Status: DISCONTINUED | OUTPATIENT
Start: 2017-04-25 | End: 2017-04-25 | Stop reason: HOSPADM

## 2017-04-25 RX ORDER — NALOXONE HCL 0.4 MG/ML
0.02 VIAL (ML) INJECTION
Status: DISCONTINUED | OUTPATIENT
Start: 2017-04-25 | End: 2017-04-25

## 2017-04-25 RX ORDER — HYDROMORPHONE HYDROCHLORIDE 1 MG/ML
1 INJECTION, SOLUTION INTRAMUSCULAR; INTRAVENOUS; SUBCUTANEOUS EVERY 4 HOURS PRN
Status: DISCONTINUED | OUTPATIENT
Start: 2017-04-25 | End: 2017-04-26

## 2017-04-25 RX ORDER — TACROLIMUS 1 MG/1
3 CAPSULE ORAL 2 TIMES DAILY
Status: DISCONTINUED | OUTPATIENT
Start: 2017-04-25 | End: 2017-04-26 | Stop reason: HOSPADM

## 2017-04-25 RX ORDER — PROPOFOL 10 MG/ML
VIAL (ML) INTRAVENOUS
Status: DISCONTINUED | OUTPATIENT
Start: 2017-04-25 | End: 2017-04-25

## 2017-04-25 RX ORDER — DIPHENHYDRAMINE HCL 25 MG
25 CAPSULE ORAL EVERY 4 HOURS PRN
Status: DISCONTINUED | OUTPATIENT
Start: 2017-04-25 | End: 2017-04-26 | Stop reason: HOSPADM

## 2017-04-25 RX ORDER — LINEZOLID 600 MG/1
600 TABLET, FILM COATED ORAL EVERY 12 HOURS
Status: DISCONTINUED | OUTPATIENT
Start: 2017-04-25 | End: 2017-04-26 | Stop reason: HOSPADM

## 2017-04-25 RX ORDER — HYDROMORPHONE HYDROCHLORIDE 1 MG/ML
0.5 INJECTION, SOLUTION INTRAMUSCULAR; INTRAVENOUS; SUBCUTANEOUS EVERY 4 HOURS PRN
Status: DISCONTINUED | OUTPATIENT
Start: 2017-04-25 | End: 2017-04-26

## 2017-04-25 RX ORDER — SCOLOPAMINE TRANSDERMAL SYSTEM 1 MG/1
PATCH, EXTENDED RELEASE TRANSDERMAL
Status: DISCONTINUED | OUTPATIENT
Start: 2017-04-25 | End: 2017-04-25

## 2017-04-25 RX ORDER — HYDROMORPHONE HYDROCHLORIDE 2 MG/ML
0.4 INJECTION, SOLUTION INTRAMUSCULAR; INTRAVENOUS; SUBCUTANEOUS EVERY 5 MIN PRN
Status: DISCONTINUED | OUTPATIENT
Start: 2017-04-25 | End: 2017-04-25 | Stop reason: HOSPADM

## 2017-04-25 RX ORDER — CEFEPIME HYDROCHLORIDE 2 G/50ML
2 INJECTION, SOLUTION INTRAVENOUS
Status: DISCONTINUED | OUTPATIENT
Start: 2017-04-25 | End: 2017-04-26 | Stop reason: HOSPADM

## 2017-04-25 RX ADMIN — CEFEPIME HYDROCHLORIDE 2 G: 2 INJECTION, SOLUTION INTRAVENOUS at 05:04

## 2017-04-25 RX ADMIN — SODIUM CHLORIDE, SODIUM LACTATE, POTASSIUM CHLORIDE, AND CALCIUM CHLORIDE: .6; .31; .03; .02 INJECTION, SOLUTION INTRAVENOUS at 12:04

## 2017-04-25 RX ADMIN — PROMETHAZINE HYDROCHLORIDE 12.5 MG: 25 INJECTION INTRAMUSCULAR; INTRAVENOUS at 09:04

## 2017-04-25 RX ADMIN — PROPOFOL 150 MCG/KG/MIN: 10 INJECTION, EMULSION INTRAVENOUS at 07:04

## 2017-04-25 RX ADMIN — GABAPENTIN 600 MG: 300 CAPSULE ORAL at 04:04

## 2017-04-25 RX ADMIN — CLINDAMYCIN PHOSPHATE 600 MG: 18 INJECTION, SOLUTION INTRAVENOUS at 07:04

## 2017-04-25 RX ADMIN — SODIUM CHLORIDE, SODIUM LACTATE, POTASSIUM CHLORIDE, AND CALCIUM CHLORIDE: 600; 310; 30; 20 INJECTION, SOLUTION INTRAVENOUS at 07:04

## 2017-04-25 RX ADMIN — SCOPALAMINE 1 PATCH: 1 PATCH, EXTENDED RELEASE TRANSDERMAL at 07:04

## 2017-04-25 RX ADMIN — ROCURONIUM BROMIDE 20 MG: 10 INJECTION, SOLUTION INTRAVENOUS at 08:04

## 2017-04-25 RX ADMIN — TACROLIMUS 3 MG: 1 CAPSULE ORAL at 05:04

## 2017-04-25 RX ADMIN — LINEZOLID 600 MG: 600 TABLET, FILM COATED ORAL at 05:04

## 2017-04-25 RX ADMIN — MYCOPHENOLATE MOFETIL 500 MG: 250 CAPSULE ORAL at 04:04

## 2017-04-25 RX ADMIN — Medication: at 12:04

## 2017-04-25 RX ADMIN — FENTANYL CITRATE 50 MCG: 50 INJECTION, SOLUTION INTRAMUSCULAR; INTRAVENOUS at 07:04

## 2017-04-25 RX ADMIN — INSULIN ASPART 2 UNITS: 100 INJECTION, SOLUTION INTRAVENOUS; SUBCUTANEOUS at 05:04

## 2017-04-25 RX ADMIN — ONDANSETRON 4 MG: 2 INJECTION INTRAMUSCULAR; INTRAVENOUS at 08:04

## 2017-04-25 RX ADMIN — NEOSTIGMINE METHYLSULFATE 2 MG: 1 INJECTION INTRAVENOUS at 08:04

## 2017-04-25 RX ADMIN — ONDANSETRON 8 MG: 8 TABLET, ORALLY DISINTEGRATING ORAL at 08:04

## 2017-04-25 RX ADMIN — LIDOCAINE HYDROCHLORIDE 50 MG: 10 INJECTION, SOLUTION INTRAVENOUS at 07:04

## 2017-04-25 RX ADMIN — CARBOXYMETHYLCELLULOSE SODIUM 2 DROP: 2.5 SOLUTION/ DROPS OPHTHALMIC at 08:04

## 2017-04-25 RX ADMIN — FENTANYL CITRATE 100 MCG: 50 INJECTION, SOLUTION INTRAMUSCULAR; INTRAVENOUS at 07:04

## 2017-04-25 RX ADMIN — LEVALBUTEROL 1.25 MG: 1.25 SOLUTION, CONCENTRATE RESPIRATORY (INHALATION) at 06:04

## 2017-04-25 RX ADMIN — HYDROMORPHONE HYDROCHLORIDE 0.4 MG: 2 INJECTION, SOLUTION INTRAMUSCULAR; INTRAVENOUS; SUBCUTANEOUS at 09:04

## 2017-04-25 RX ADMIN — GABAPENTIN 600 MG: 300 CAPSULE ORAL at 09:04

## 2017-04-25 RX ADMIN — PROPOFOL 200 MG: 10 INJECTION, EMULSION INTRAVENOUS at 07:04

## 2017-04-25 RX ADMIN — MIDAZOLAM HYDROCHLORIDE 2 MG: 1 INJECTION, SOLUTION INTRAMUSCULAR; INTRAVENOUS at 07:04

## 2017-04-25 RX ADMIN — TACROLIMUS 0.5 MG: 0.5 CAPSULE ORAL at 04:04

## 2017-04-25 RX ADMIN — ROCURONIUM BROMIDE 30 MG: 10 INJECTION, SOLUTION INTRAVENOUS at 07:04

## 2017-04-25 RX ADMIN — PROMETHAZINE HYDROCHLORIDE 6.25 MG: 25 INJECTION INTRAMUSCULAR; INTRAVENOUS at 08:04

## 2017-04-25 RX ADMIN — HYDROMORPHONE HYDROCHLORIDE 1 MG: 1 INJECTION, SOLUTION INTRAMUSCULAR; INTRAVENOUS; SUBCUTANEOUS at 04:04

## 2017-04-25 RX ADMIN — GLYCOPYRROLATE 0.4 MG: 0.2 INJECTION, SOLUTION INTRAMUSCULAR; INTRAVENOUS at 08:04

## 2017-04-25 RX ADMIN — METOPROLOL TARTRATE 2.5 MG: 5 INJECTION, SOLUTION INTRAVENOUS at 07:04

## 2017-04-25 RX ADMIN — PROPOFOL 60 MG: 10 INJECTION, EMULSION INTRAVENOUS at 08:04

## 2017-04-25 RX ADMIN — HYDROMORPHONE HYDROCHLORIDE 0.4 MG: 2 INJECTION, SOLUTION INTRAMUSCULAR; INTRAVENOUS; SUBCUTANEOUS at 10:04

## 2017-04-25 RX ADMIN — HYDROCORTISONE SODIUM SUCCINATE 100 MG: 100 INJECTION, POWDER, FOR SOLUTION INTRAMUSCULAR; INTRAVENOUS at 07:04

## 2017-04-25 RX ADMIN — HYDROMORPHONE HYDROCHLORIDE 1 MG: 1 INJECTION, SOLUTION INTRAMUSCULAR; INTRAVENOUS; SUBCUTANEOUS at 09:04

## 2017-04-25 RX ADMIN — PROMETHAZINE HYDROCHLORIDE 12.5 MG: 25 INJECTION INTRAMUSCULAR; INTRAVENOUS at 12:04

## 2017-04-25 RX ADMIN — HYDRALAZINE HYDROCHLORIDE 10 MG: 20 INJECTION INTRAMUSCULAR; INTRAVENOUS at 08:04

## 2017-04-25 NOTE — INTERVAL H&P NOTE
The patient has been examined and the H&P has been reviewed:    I concur with the findings and no changes have occurred since H&P was written.    Anesthesia/Surgery risks, benefits and alternative options discussed and understood by patient/family.          Active Hospital Problems    Diagnosis  POA    Dysmenorrhea [N94.6]  Yes      Resolved Hospital Problems    Diagnosis Date Resolved POA   No resolved problems to display.     Heath Sadler MD  PGY 4 OB/GYN  [phone]      Deny Dias MD

## 2017-04-25 NOTE — OR NURSING
Sleeping, snoring, relaxed face, when aroused by nurse, reports pain as 8 and requests pain medication, falls back to sleep. Dr Boles consulted instructed to give no more pain medication after the total of 2 mg is given.

## 2017-04-25 NOTE — ANESTHESIA PROCEDURE NOTES
Arterial    Diagnosis: Lung transplant    Patient location during procedure: done in OR  Procedure start time: 4/25/2017 7:20 AM  Timeout: 4/25/2017 7:20 AM  Procedure end time: 4/25/2017 7:29 AM  Staffing  Anesthesiologist: BARBARA TEJEDA  Performed by: anesthesiologist   Anesthesiologist was present at the time of the procedure.  Preanesthetic Checklist  Completed: patient identified, site marked, surgical consent, pre-op evaluation, timeout performed, IV checked, risks and benefits discussed, monitors and equipment checked and anesthesia consent given  Arterial Line  Skin Prep: chlorhexidine gluconate  Local Infiltration: none  Orientation: right  Location: radial  Catheter Size: 20 G{[facility] ANESTHESIA BLOCK ART PLACEMENTInsertion Attempts: 2  Assessment  Dressing: secured with tape and tegaderm  Patient: Tolerated well

## 2017-04-25 NOTE — OPERATIVE NOTE ADDENDUM
Certification of Assistant at Surgery       Surgery Date: 4/25/2017     Participating Surgeons:  Surgeon(s) and Role:     * Deny Dias Jr., MD - Primary        Clary Collazo NP-C,  first assist     * Heath Sadler MD - Resident - Assisting    Procedures:  Procedure(s) (LRB):  ROBOTIC ASSISTED LAPAROSCOPIC HYSTERECTOMY (N/A)    Assistant Surgeon's Certification of Necessity:  I understand that section 1842 (b) (6) (d) of the Social Security Act generally prohibits Medicare Part B reasonable charge payment for the services of assistants at surgery in teaching hospitals when qualified residents are available to furnish such services. I certify that the services for which payment is claimed were medically necessary, and that no qualified resident was available to perform the services. I further understand that these services are subject to post-payment review by the Medicare carrier.      Clary Collazo NP    04/25/2017  9:04 AM

## 2017-04-25 NOTE — ADDENDUM NOTE
Addendum  created 04/25/17 1243 by Miroslava Galo, CRNA    Anesthesia Intra Meds edited, Orders acknowledged in Narrator

## 2017-04-25 NOTE — ANESTHESIA POSTPROCEDURE EVALUATION
Anesthesia Post Evaluation    Patient: Juanita Ibarra    Procedure(s) Performed: Procedure(s) (LRB):  ROBOTIC ASSISTED LAPAROSCOPIC HYSTERECTOMY (N/A)    Final Anesthesia Type: general  Patient location during evaluation: PACU  Patient participation: Yes- Able to Participate  Level of consciousness: oriented and awake  Post-procedure vital signs: reviewed and stable  Pain management: adequate  Airway patency: patent  PONV status at discharge: No PONV  Anesthetic complications: no      Cardiovascular status: hemodynamically stable  Respiratory status: unassisted, spontaneous ventilation and room air  Hydration status: euvolemic  Follow-up not needed.        Visit Vitals    /71    Pulse 107    Temp 36.8 °C (98.3 °F) (Oral)    Resp 16    Ht 5' (1.524 m)    Wt 53.5 kg (118 lb)    SpO2 97%    BMI 23.05 kg/m2       Pain/Richard Score: Pain Assessment Performed: Yes (4/25/2017  9:45 AM)  Presence of Pain: complains of pain/discomfort (4/25/2017  9:45 AM)  Pain Rating Prior to Med Admin: 8 (4/25/2017 10:18 AM)  Pain Rating Post Med Admin: 8 (4/25/2017  9:45 AM)  Richard Score: 8 (4/25/2017  9:45 AM)

## 2017-04-25 NOTE — OR NURSING
A-line d/c'd, pressure help for 10 minutes, no bleeding noted, pressure dressing applied. O2 sats on room air 92-95. Resumed O2 at 2 liters, Calm, thanked nurse for taking such good care of her, falls back to sleep.

## 2017-04-25 NOTE — PROGRESS NOTES
Pt arrived to floor via stretcher with SUSAN Mcmahon and transferred self to bed. VS taken and stable on 2 L NC. IVF started, SCDs applied, oriented to room, call light placed within reach, bed low and locked, and family at bedside. Pt complains of pain 10/10. Dressing to abdomen; lap sites x4, CDI. Patient due to void. PCA pump initiated. No acute distress noted at this time. Will continue to monitor.     1833 VSS on RA and afebrile. DC PCA pump. Blood glucose monitored. Tolerating ordered diet. Voided spontaneously clear light green urine. Ambulated room with stand by assistance.  IV site WNL. Plan of care reviewed with patient and all questions answered. Bed low, locked w/ bed alarm on. Call light within reach. Purposeful rounding performed. No other complaints at this time.

## 2017-04-25 NOTE — ANESTHESIA POSTPROCEDURE EVALUATION
Anesthesia Post Evaluation    Patient: Juanita Ibarra    Procedure(s) Performed: Procedure(s) (LRB):  ROBOTIC ASSISTED LAPAROSCOPIC HYSTERECTOMY (N/A)    Final Anesthesia Type: general  Patient location during evaluation: PACU  Patient participation: Yes- Able to Participate  Level of consciousness: awake and alert  Post-procedure vital signs: reviewed and stable  Pain management: adequate  Airway patency: patent  PONV status at discharge: No PONV  Anesthetic complications: no      Cardiovascular status: blood pressure returned to baseline  Respiratory status: unassisted  Hydration status: euvolemic  Follow-up not needed.        Visit Vitals    /70    Pulse 103    Temp 36.8 °C (98.3 °F) (Oral)    Resp 16    Ht 5' (1.524 m)    Wt 53.5 kg (118 lb)    SpO2 98%    BMI 23.05 kg/m2       Pain/Richard Score: Pain Assessment Performed: Yes (4/25/2017  9:45 AM)  Presence of Pain: complains of pain/discomfort (4/25/2017  9:45 AM)  Pain Rating Prior to Med Admin: 8 (4/25/2017 10:18 AM)  Pain Rating Post Med Admin: 8 (4/25/2017  9:45 AM)  Richard Score: 8 (4/25/2017  9:45 AM)

## 2017-04-25 NOTE — IP AVS SNAPSHOT
[facility] Location ([facility])  [address] [place], [place] [id number]  Phone: [phone]           Patient Discharge Instructions   Our goal is to set you up for success. This packet includes information on your condition, medications, and your home care.  It will help you care for yourself to prevent having to return to the hospital.     Please ask your nurse if you have any questions.      There are many details to remember when preparing to leave the hospital. Here is what you will need to do:    1. Take your medicine. If you are prescribed medications, review your Medication List on the following pages. You may have new medications to  at the pharmacy and others that you'll need to stop taking. Review the instructions for how and when to take your medications. Talk with your doctor or nurses if you are unsure of what to do.     2. Go to your follow-up appointments. Specific follow-up information is listed in the following pages. Your may be contacted by a nurse or clinical provider about future appointments. Be sure we have all of the phone numbers to reach you. Please contact your provider's office if you are unable to make an appointment.     3. Watch for warning signs. Your doctor or nurse will give you detailed warning signs to watch for and when to call for assistance. These instructions may also include educational information about your condition. If you experience any of warning signs to your health, call your doctor.               ** Verify the list of medication(s) below is accurate and up to date. Carry this with you in case of emergency. If your medications have changed, please notify your healthcare provider.             Medication List      START taking these medications        Additional Info                      oxycodone 10 mg Tab immediate release tablet   Commonly known as:  ROXICODONE   Quantity:  30 tablet   Refills:  0   Dose:  10 mg    Last time this was given:  10 mg  on 4/26/2017  1:05 PM   Instructions:  Take 1 tablet (10 mg total) by mouth every 4 (four) hours as needed for Pain.     Begin Date    AM    Noon    PM    Bedtime         CHANGE how you take these medications        Additional Info                      CREON 24,000-76,000 -120,000 unit capsule   Quantity:  810 capsule   Refills:  0   What changed:  See the new instructions.   Generic drug:  lipase-protease-amylase 24,000-76,000-120,000 units    Instructions:  TAKE 5 CAPSULES BY MOUTH WITH MEALS AND 4 CAPSULES WITH SNACKS EVERYDAY     Begin Date    AM    Noon    PM    Bedtime       gabapentin 300 MG capsule   Commonly known as:  NEURONTIN   Quantity:  90 capsule   Refills:  11   Dose:  300 mg   What changed:  how much to take    Last time this was given:  600 mg on 4/26/2017  1:05 PM   Instructions:  Take 1 capsule (300 mg total) by mouth 3 (three) times daily.     Begin Date    AM    Noon    PM    Bedtime         CONTINUE taking these medications        Additional Info                      aluminum-magnesium hydroxide-simethicone 200-200-20 mg/5 mL Susp   Commonly known as:  MAALOX ADVANCED   Quantity:  148 mL   Refills:  2   Dose:  30 mL    Instructions:  Take 30 mLs by mouth before meals and at bedtime as needed.     Begin Date    AM    Noon    PM    Bedtime       aripiprazole 2 MG Tab   Commonly known as:  ABILIFY   Refills:  0   Dose:  2 mg    Last time this was given:  2 mg on 4/26/2017  8:59 AM   Instructions:  Take 2 mg by mouth once daily.     Begin Date    AM    Noon    PM    Bedtime       azithromycin 500 MG tablet   Commonly known as:  ZITHROMAX   Quantity:  15 tablet   Refills:  11   Dose:  500 mg    Instructions:  Take 1 tablet (500 mg total) by mouth every Mon, Wed, Fri.     Begin Date    AM    Noon    PM    Bedtime       butalbital-acetaminophen-caffeine -40 mg -40 mg per tablet   Commonly known as:  FIORICET, ESGIC   Quantity:  120 tablet   Refills:  0    Last time this was given:  2  tablets on 4/26/2017  7:12 AM   Instructions:  TAKE 1 TABLET BY MOUTH EVERY 6 HOURS AS NEEDED FOR HEADACHE     Begin Date    AM    Noon    PM    Bedtime       calcium-vitamin D3 500 mg(1,250mg) -200 unit per tablet   Quantity:  60 tablet   Refills:  11   Dose:  1 tablet    Instructions:  Take 1 tablet by mouth 2 (two) times daily with meals.     Begin Date    AM    Noon    PM    Bedtime       ceFEPIme in dextrose 5% 2 gram/50 mL Pgbk   Commonly known as:  MAXIPIME   Quantity:  1 each   Refills:  0   Dose:  2 g   Indications:  mastoiditis    Last time this was given:  2 g on 4/26/2017  9:01 AM   Instructions:  Inject 50 mLs (2 g total) into the vein every 8 (eight) hours.     Begin Date    AM    Noon    PM    Bedtime       ciprofloxacin-dexamethasone 0.3-0.1% 0.3-0.1 % Drps   Commonly known as:  CIPRODEX   Quantity:  7.5 mL   Refills:  0   Dose:  4 drop    Instructions:  Place 4 drops into the right ear 2 (two) times daily.     Begin Date    AM    Noon    PM    Bedtime       dapsone 100 MG Tab   Quantity:  30 tablet   Refills:  11    Last time this was given:  100 mg on 4/26/2017  8:59 AM   Instructions:  TAKE 1 TABLET BY MOUTH EVERY DAY     Begin Date    AM    Noon    PM    Bedtime       diphenhydrAMINE 50 MG tablet   Commonly known as:  BENADRYL   Quantity:  1 tablet   Refills:  0   Dose:  50 mg    Instructions:  Take 1 tablet (50 mg total) by mouth every 6 (six) hours as needed for Itching.     Begin Date    AM    Noon    PM    Bedtime       duloxetine 30 MG capsule   Commonly known as:  CYMBALTA   Refills:  0   Dose:  30 mg    Last time this was given:  30 mg on 4/26/2017  8:59 AM   Instructions:  Take 30 mg by mouth once daily.     Begin Date    AM    Noon    PM    Bedtime       fexofenadine 180 MG tablet   Commonly known as:  ALLEGRA   Refills:  0   Dose:  180 mg    Instructions:  Take 180 mg by mouth once daily.     Begin Date    AM    Noon    PM    Bedtime       folic acid 1 MG tablet   Commonly known as:   FOLVITE   Quantity:  30 tablet   Refills:  11    Instructions:  TAKE 1 TABLET BY MOUTH EVERY DAY     Begin Date    AM    Noon    PM    Bedtime       insulin glargine (TOUJEO) 300 unit/mL (1.5 mL) Inpn pen   Commonly known as:  TOUJEO   Refills:  0   Dose:  5 Units    Instructions:  Inject 5 Units into the skin once daily.     Begin Date    AM    Noon    PM    Bedtime       levalbuterol 45 mcg/actuation inhaler   Commonly known as:  XOPENEX HFA   Quantity:  15 g   Refills:  0    Instructions:  INHALE 1 TO 2 PUFFS INTO THE LUNGS EVERY 6 HOURS AS NEEDED FOR WHEEZING OR SHORTNESS OF BREATH. USE WITH SPACER.     Begin Date    AM    Noon    PM    Bedtime       linezolid 600 mg Tab   Commonly known as:  ZYVOX   Quantity:  28 tablet   Refills:  0   Dose:  600 mg    Last time this was given:  600 mg on 4/26/2017  9:00 AM   Instructions:  Take 1 tablet (600 mg total) by mouth every 12 (twelve) hours.     Begin Date    AM    Noon    PM    Bedtime       lorazepam 2 MG Tab   Commonly known as:  ATIVAN   Quantity:  60 tablet   Refills:  3   Dose:  2 mg    Instructions:  Take 1 tablet (2 mg total) by mouth every 12 (twelve) hours as needed (for anxiety).     Begin Date    AM    Noon    PM    Bedtime       magnesium oxide 400 mg tablet   Commonly known as:  MAG-OX   Quantity:  60 tablet   Refills:  11    Instructions:  Take 1 tablet (400 mg total) by mouth 2 (two) times daily.     Begin Date    AM    Noon    PM    Bedtime       mometasone-formoterol 100-5 mcg/actuation Hfaa   Refills:  0   Dose:  1 puff    Instructions:  Inhale 1 puff into the lungs 2 (two) times daily.     Begin Date    AM    Noon    PM    Bedtime       montelukast 10 mg tablet   Commonly known as:  SINGULAIR   Quantity:  30 tablet   Refills:  11    Last time this was given:  10 mg on 4/26/2017  9:01 AM   Instructions:  TAKE 1 TABLET BY MOUTH EVERY DAY     Begin Date    AM    Noon    PM    Bedtime       morphine 15 MG 12 hr tablet   Commonly known as:  MS CONTIN    Refills:  0   Dose:  15 mg    Instructions:  Take 15 mg by mouth 2 (two) times daily.     Begin Date    AM    Noon    PM    Bedtime       mv. min cmb#[id number] 100-700-10 mcg-mcg-mg Cap cap   Commonly known as:  AQUADEKS   Quantity:  60 capsule   Refills:  11   Dose:  1 capsule    Instructions:  Take 1 capsule by mouth 2 (two) times daily.     Begin Date    AM    Noon    PM    Bedtime       mycophenolate 250 mg Cap   Commonly known as:  CELLCEPT   Quantity:  120 capsule   Refills:  11   Dose:  500 mg    Last time this was given:  500 mg on 4/26/2017  9:01 AM   Instructions:  Take 2 capsules (500 mg total) by mouth 2 (two) times daily.     Begin Date    AM    Noon    PM    Bedtime       omeprazole 40 MG capsule   Commonly known as:  PRILOSEC   Quantity:  30 capsule   Refills:  11    Instructions:  TAKE 1 CAPSULE BY MOUTH EVERY MORNING     Begin Date    AM    Noon    PM    Bedtime       ondansetron 8 MG tablet   Commonly known as:  ZOFRAN   Quantity:  30 tablet   Refills:  1   Dose:  8 mg    Instructions:  Take 1 tablet (8 mg total) by mouth every 8 (eight) hours as needed.     Begin Date    AM    Noon    PM    Bedtime       polyethylene glycol 17 gram Pwpk   Commonly known as:  GLYCOLAX   Quantity:  60 packet   Refills:  11   Dose:  17 g    Instructions:  Take 17 g by mouth 2 (two) times daily.     Begin Date    AM    Noon    PM    Bedtime       predniSONE 5 MG tablet   Commonly known as:  DELTASONE   Refills:  0   Dose:  5 mg    Last time this was given:  5 mg on 4/26/2017  9:50 AM   Instructions:  Take 5 mg by mouth once daily.     Begin Date    AM    Noon    PM    Bedtime       PROCHAMBER   Quantity:  1 Device   Refills:  0   Generic drug:  inhalation spacing device    Instructions:  USE AS DIRECTED     Begin Date    AM    Noon    PM    Bedtime       promethazine 25 MG tablet   Commonly known as:  PHENERGAN   Quantity:  45 tablet   Refills:  0    Instructions:  TAKE 1 TABLET BY MOUTH EVERY 8 HOURS AS NEEDED  FOR NAUSEA     Begin Date    AM    Noon    PM    Bedtime       * tacrolimus 1 MG Cap   Commonly known as:  PROGRAF   Quantity:  180 capsule   Refills:  11   Dose:  3 mg    Last time this was given:  3.5 mg on 4/26/2017  9:00 AM   Instructions:  Take 3 capsules (3 mg total) by mouth every 12 (twelve) hours. Daily doses: 3.5 mg every 12 hours.     Begin Date    AM    Noon    PM    Bedtime       * tacrolimus 0.5 MG Cap   Commonly known as:  PROGRAF   Quantity:  60 capsule   Refills:  11   Dose:  0.5 mg    Last time this was given:  3.5 mg on 4/26/2017  9:00 AM   Instructions:  Take 1 capsule (0.5 mg total) by mouth every 12 (twelve) hours.     Begin Date    AM    Noon    PM    Bedtime       tizanidine 4 MG tablet   Commonly known as:  ZANAFLEX   Quantity:  90 tablet   Refills:  0    Instructions:  TAKE 2 TABLETS BY MOUTH EVERY 6 HOURS AS NEEDED     Begin Date    AM    Noon    PM    Bedtime       * Notice:  This list has 2 medication(s) that are the same as other medications prescribed for you. Read the directions carefully, and ask your doctor or other care provider to review them with you.         Where to Get Your Medications      You can get these medications from any pharmacy     Bring a paper prescription for each of these medications     oxycodone 10 mg Tab immediate release tablet                  Please bring to all follow up appointments:    1. A copy of your discharge instructions.  2. All medicines you are currently taking in their original bottles.  3. Identification and insurance card.    Please arrive 15 minutes ahead of scheduled appointment time.    Please call 24 hours in advance if you must reschedule your appointment and/or time.        Your Scheduled Appointments     May 04, 2017  8:30 AM CDT   Post OP with MD Dawson Cramer - Otorhinolaryngology ([facility] )    [address]  [facility] [address]   [phone]            May 19, 2017  1:30 PM CDT   Audiogram  with AUDIOGRAM, AUDIO   Dawson Arroyo - Audiology ([facility] )    [address]  [place] [address] [phone]            May 19, 2017  2:15 PM CDT   Established Patient Visit with MD Dawson Moss - Otorhinolaryngology ([facility] )    [address]  [place] [address] [phone]            Jun 09, 2017  1:30 PM CDT   Post OP with Deny Dias Jr., MD   [facility] - OB/GYN [address] ([facility])    [address] [facility] [address]  [place] [address] [phone]              Follow-up Information     Follow up with Deny Dias Jr, MD In 6 weeks.    Specialties:  Obstetrics, Obstetrics and Gynecology    Why:  Post-op checkup    Contact information:    [address] [facility] [address]  [place] [address] [phone]          Discharge Instructions     Future Orders    Activity as tolerated     Call MD for:  difficulty breathing or increased cough     Call MD for:  increased confusion or weakness     Call MD for:  persistent dizziness, light-headedness, or visual disturbances     Call MD for:  persistent nausea and vomiting or diarrhea     Call MD for:  severe persistent headache     Call MD for:  severe uncontrolled pain     Call MD for:  temperature >100.4     Call MD for:  worsening rash     Diet general     Questions:    Total calories:      Fat restriction, if any:      Protein restriction, if any:      Na restriction, if any:      Fluid restriction:      Additional restrictions:          Primary Diagnosis     Your primary diagnosis was:  Painful Menstruation      Admission Information     Date & Time Provider Department [facility]    4/25/2017  5:07 AM Deny Dias Jr., MD [facility] [id number]      Care Providers     Provider Role Specialty Primary office phone    Deny Dias Jr., MD Attending Provider Obstetrics [phone]    Deny Dias Jr., MD Surgeon  Obstetrics [phone]      Your Vitals Were     BP Pulse Temp  Resp Height Weight    140/98 (BP Location: Left arm, Patient Position: Lying, BP Method: Automatic) 102 98.6 °F (37 °C) (Oral) 18 5' (1.524 m) 53.5 kg (118 lb)    SpO2 BMI             95% 23.05 kg/m2         Recent Lab Values        6/29/2014 8/11/2014 5/19/2016                     4:30 AM  3:09 PM  8:47 AM         A1C 4.0 (L) 4.6 4.2 (L)                   Pending Labs     Order Current Status    Specimen to Pathology - Surgery In process      Allergies as of 4/26/2017        Reactions    Albuterol Palpitations    Colistin Anaphylaxis    Vancomycin Analogues     Infusion reaction that does not resolve with slowing    Neupogen [Filgrastim] Other (See Comments)    Ostealgia after five daily doses of 300 mcg.      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  Unwilling to retry therapy.      [facility]     [facility] Nurse Care Line - 24/7 Assistance  Unless otherwise directed by your provider, please contact [facility], our nurse care line that is available for 24/7 assistance.     Registered nurses in the [facility] provide clinical advisement, health education, appointment booking, and other advisory services.  Call for this free service at [phone].        Advance Directives     An advance directive is a document which, in the event you are no longer able to make decisions for yourself, tells your healthcare team what kind of treatment you do or do not want to receive, or who you would like to make those decisions for  you.  If you do not currently have an advance directive, [facility] encourages you to create one.  For more information call:  [phone]), [phone] ([phone]),  or log on to [email or web address].        Language Assistance Services     ATTENTION: Language assistance services are available, free of charge. Please call [phone].      ATENCIÓN: Si habla kristen, tiene a odom disposición servicios gratuitos de asistencia lingüística. Llame al [phone].     CHÚ Ý: N?u b?n nói Ti?ng Vi?t, có các d?ch v? h? tr? ngôn ng? mi?n phí dành cho b?n. G?i s? [phone].        Pneumonmia Discharge Instructions                Diabetes Discharge Instructions                                    [facility] complies with applicable Federal civil rights laws and does not discriminate on the basis of race, color, national origin, age, disability, or sex.

## 2017-04-25 NOTE — TRANSFER OF CARE
Anesthesia Transfer of Care Note    Patient: Juanita Ibarra    Procedure(s) Performed: Procedure(s) (LRB):  ROBOTIC ASSISTED LAPAROSCOPIC HYSTERECTOMY (N/A)    Patient location: PACU    Anesthesia Type: general    Transport from OR: Transported from OR on 2-3 L/min O2 by NC with adequate spontaneous ventilation    Post pain: adequate analgesia    Post assessment: no apparent anesthetic complications    Post vital signs: stable    Level of consciousness: awake    Nausea/Vomiting: no nausea/vomiting    Complications: none          Last vitals:   Visit Vitals    /69 (BP Location: Left arm, Patient Position: Lying, BP Method: Automatic)    Pulse 92    Temp 36.8 °C (98.3 °F) (Oral)    Resp 16    Ht 5' (1.524 m)    Wt 53.5 kg (118 lb)    SpO2 100%    BMI 23.05 kg/m2

## 2017-04-26 VITALS
RESPIRATION RATE: 18 BRPM | HEART RATE: 102 BPM | HEIGHT: 60 IN | BODY MASS INDEX: 23.16 KG/M2 | WEIGHT: 118 LBS | DIASTOLIC BLOOD PRESSURE: 98 MMHG | SYSTOLIC BLOOD PRESSURE: 140 MMHG | OXYGEN SATURATION: 95 % | TEMPERATURE: 99 F

## 2017-04-26 LAB
BASOPHILS # BLD AUTO: 0.01 K/UL
BASOPHILS NFR BLD: 0.2 %
DIFFERENTIAL METHOD: ABNORMAL
EOSINOPHIL # BLD AUTO: 0.1 K/UL
EOSINOPHIL NFR BLD: 2.2 %
ERYTHROCYTE [DISTWIDTH] IN BLOOD BY AUTOMATED COUNT: 15.1 %
HCT VFR BLD AUTO: 31.1 %
HGB BLD-MCNC: 9.6 G/DL
LYMPHOCYTES # BLD AUTO: 1.6 K/UL
LYMPHOCYTES NFR BLD: 27 %
MCH RBC QN AUTO: 29.2 PG
MCHC RBC AUTO-ENTMCNC: 30.9 %
MCV RBC AUTO: 95 FL
MONOCYTES # BLD AUTO: 0.4 K/UL
MONOCYTES NFR BLD: 6.1 %
NEUTROPHILS # BLD AUTO: 3.8 K/UL
NEUTROPHILS NFR BLD: 64.3 %
PLATELET # BLD AUTO: 82 K/UL
PMV BLD AUTO: 10.6 FL
POCT GLUCOSE: 89 MG/DL (ref 70–110)
RBC # BLD AUTO: 3.29 M/UL
WBC # BLD AUTO: 5.93 K/UL

## 2017-04-26 PROCEDURE — 63600175 PHARM REV CODE 636 W HCPCS: Performed by: OBSTETRICS & GYNECOLOGY

## 2017-04-26 PROCEDURE — 25000003 PHARM REV CODE 250: Performed by: OBSTETRICS & GYNECOLOGY

## 2017-04-26 PROCEDURE — 36415 COLL VENOUS BLD VENIPUNCTURE: CPT

## 2017-04-26 PROCEDURE — 63600175 PHARM REV CODE 636 W HCPCS: Performed by: STUDENT IN AN ORGANIZED HEALTH CARE EDUCATION/TRAINING PROGRAM

## 2017-04-26 PROCEDURE — 25000003 PHARM REV CODE 250: Performed by: STUDENT IN AN ORGANIZED HEALTH CARE EDUCATION/TRAINING PROGRAM

## 2017-04-26 PROCEDURE — 85025 COMPLETE CBC W/AUTO DIFF WBC: CPT

## 2017-04-26 RX ORDER — OXYCODONE HYDROCHLORIDE 5 MG/1
5 TABLET ORAL EVERY 4 HOURS PRN
Status: DISCONTINUED | OUTPATIENT
Start: 2017-04-26 | End: 2017-04-26 | Stop reason: HOSPADM

## 2017-04-26 RX ORDER — PROMETHAZINE HYDROCHLORIDE 25 MG/1
25 TABLET ORAL EVERY 4 HOURS PRN
Status: DISCONTINUED | OUTPATIENT
Start: 2017-04-26 | End: 2017-04-26 | Stop reason: HOSPADM

## 2017-04-26 RX ORDER — OXYCODONE HYDROCHLORIDE 5 MG/1
10 TABLET ORAL EVERY 6 HOURS PRN
Status: DISCONTINUED | OUTPATIENT
Start: 2017-04-26 | End: 2017-04-26

## 2017-04-26 RX ORDER — OXYCODONE HYDROCHLORIDE 10 MG/1
10 TABLET ORAL EVERY 4 HOURS PRN
Qty: 30 TABLET | Refills: 0 | Status: SHIPPED | OUTPATIENT
Start: 2017-04-26 | End: 2017-04-26 | Stop reason: HOSPADM

## 2017-04-26 RX ORDER — PROMETHAZINE HYDROCHLORIDE 12.5 MG/1
12.5 TABLET ORAL EVERY 4 HOURS PRN
Status: DISCONTINUED | OUTPATIENT
Start: 2017-04-26 | End: 2017-04-26

## 2017-04-26 RX ORDER — HYDROMORPHONE HYDROCHLORIDE 1 MG/ML
1 INJECTION, SOLUTION INTRAMUSCULAR; INTRAVENOUS; SUBCUTANEOUS
Status: DISCONTINUED | OUTPATIENT
Start: 2017-04-26 | End: 2017-04-26

## 2017-04-26 RX ORDER — BUTALBITAL, ACETAMINOPHEN AND CAFFEINE 50; 325; 40 MG/1; MG/1; MG/1
2 TABLET ORAL ONCE
Status: COMPLETED | OUTPATIENT
Start: 2017-04-26 | End: 2017-04-26

## 2017-04-26 RX ORDER — OXYCODONE HYDROCHLORIDE 5 MG/1
10 TABLET ORAL EVERY 4 HOURS PRN
Status: DISCONTINUED | OUTPATIENT
Start: 2017-04-26 | End: 2017-04-26 | Stop reason: HOSPADM

## 2017-04-26 RX ORDER — OXYCODONE HYDROCHLORIDE 5 MG/1
5 CAPSULE ORAL EVERY 4 HOURS PRN
Qty: 30 CAPSULE | Refills: 0 | Status: SHIPPED | OUTPATIENT
Start: 2017-04-26 | End: 2017-04-26 | Stop reason: HOSPADM

## 2017-04-26 RX ORDER — OXYCODONE HYDROCHLORIDE 5 MG/1
5 TABLET ORAL EVERY 4 HOURS PRN
Qty: 30 TABLET | Refills: 0 | Status: SHIPPED | OUTPATIENT
Start: 2017-04-26 | End: 2017-05-09

## 2017-04-26 RX ORDER — OXYCODONE HYDROCHLORIDE 5 MG/1
5 TABLET ORAL EVERY 6 HOURS PRN
Status: DISCONTINUED | OUTPATIENT
Start: 2017-04-26 | End: 2017-04-26

## 2017-04-26 RX ADMIN — MYCOPHENOLATE MOFETIL 500 MG: 250 CAPSULE ORAL at 09:04

## 2017-04-26 RX ADMIN — TACROLIMUS 3 MG: 1 CAPSULE ORAL at 09:04

## 2017-04-26 RX ADMIN — PREDNISONE 5 MG: 5 TABLET ORAL at 09:04

## 2017-04-26 RX ADMIN — MONTELUKAST SODIUM 10 MG: 10 TABLET, FILM COATED ORAL at 09:04

## 2017-04-26 RX ADMIN — DAPSONE 100 MG: 25 TABLET ORAL at 08:04

## 2017-04-26 RX ADMIN — OXYCODONE HYDROCHLORIDE 10 MG: 5 TABLET ORAL at 07:04

## 2017-04-26 RX ADMIN — DULOXETINE HYDROCHLORIDE 30 MG: 30 CAPSULE, DELAYED RELEASE ORAL at 08:04

## 2017-04-26 RX ADMIN — GABAPENTIN 600 MG: 300 CAPSULE ORAL at 05:04

## 2017-04-26 RX ADMIN — BUTALBITAL, ACETAMINOPHEN AND CAFFEINE 2 TABLET: 50; 325; 40 TABLET ORAL at 07:04

## 2017-04-26 RX ADMIN — ARIPIPRAZOLE 2 MG: 2 TABLET ORAL at 08:04

## 2017-04-26 RX ADMIN — PROMETHAZINE HYDROCHLORIDE 12.5 MG: 25 INJECTION INTRAMUSCULAR; INTRAVENOUS at 09:04

## 2017-04-26 RX ADMIN — LINEZOLID 600 MG: 600 TABLET, FILM COATED ORAL at 09:04

## 2017-04-26 RX ADMIN — CEFEPIME HYDROCHLORIDE 2 G: 2 INJECTION, SOLUTION INTRAVENOUS at 01:04

## 2017-04-26 RX ADMIN — OXYCODONE HYDROCHLORIDE 10 MG: 5 TABLET ORAL at 04:04

## 2017-04-26 RX ADMIN — HYDROMORPHONE HYDROCHLORIDE 1 MG: 1 INJECTION, SOLUTION INTRAMUSCULAR; INTRAVENOUS; SUBCUTANEOUS at 05:04

## 2017-04-26 RX ADMIN — GABAPENTIN 600 MG: 300 CAPSULE ORAL at 01:04

## 2017-04-26 RX ADMIN — HYDROMORPHONE HYDROCHLORIDE 1 MG: 1 INJECTION, SOLUTION INTRAMUSCULAR; INTRAVENOUS; SUBCUTANEOUS at 01:04

## 2017-04-26 RX ADMIN — CEFEPIME HYDROCHLORIDE 2 G: 2 INJECTION, SOLUTION INTRAVENOUS at 09:04

## 2017-04-26 RX ADMIN — TACROLIMUS 0.5 MG: 0.5 CAPSULE ORAL at 09:04

## 2017-04-26 RX ADMIN — HYDROMORPHONE HYDROCHLORIDE 1 MG: 1 INJECTION, SOLUTION INTRAMUSCULAR; INTRAVENOUS; SUBCUTANEOUS at 08:04

## 2017-04-26 RX ADMIN — PROMETHAZINE HYDROCHLORIDE 12.5 MG: 25 INJECTION INTRAMUSCULAR; INTRAVENOUS at 03:04

## 2017-04-26 RX ADMIN — SODIUM CHLORIDE, SODIUM LACTATE, POTASSIUM CHLORIDE, AND CALCIUM CHLORIDE: .6; .31; .03; .02 INJECTION, SOLUTION INTRAVENOUS at 02:04

## 2017-04-26 RX ADMIN — OXYCODONE HYDROCHLORIDE 10 MG: 5 TABLET ORAL at 01:04

## 2017-04-26 NOTE — PROGRESS NOTES
"[facility]  Obstetrics & Gynecology  Progress Note    Patient Name: Juanita Ibarra  MRN: [id number]  Admission Date: 4/25/2017  Primary Care Provider: Jake Alvarez MD  Principal Problem: Dysmenorrhea    Subjective:     HPI:  Juanita Ibarra is a 27 y.o. On POD #1 s/p ROBOTIC ASSISTED LAPAROSCOPIC HYSTERECTOMY for severe dysmenorrhea    Patient with history of CF s/p lung transplant.      Interval History:   Patient reports severe nausea overnight and states that she "vomited nonstop". Zofran does not work, but phenergan does improve symptoms. Pain control was difficult overnight as well with pain fluctuating between 8-10 most of the night. Patient was not able to attempt to eat anything. She was able to ambulate to the bathroom. She had to strain to urinate, but was able to void spontaneously eventually. She has passed flatus and had a bowel movement.    RN reports multiple difficult interactions with the patient overnight. States patient never really vomited but did spit into emesis bags overnight. States she saw the patient ambulate with relative ease to and from the bathroom.     Scheduled Meds:   aripiprazole  2 mg Oral Daily    azithromycin  500 mg Oral Every Mon, Wed, Fri    butalbital-acetaminophen-caffeine -40 mg  2 tablet Oral Once    ceFEPIme in dextrose 5%  2 g Intravenous Q8H    dapsone  100 mg Oral Daily    duloxetine  30 mg Oral Daily    gabapentin  600 mg Oral TID    linezolid  600 mg Oral Q12H    montelukast  10 mg Oral Daily    mycophenolate  500 mg Oral BID    predniSONE  5 mg Oral Daily    tacrolimus  0.5 mg Oral BID    tacrolimus  3 mg Oral BID     Continuous Infusions:   lactated ringers 75 mL/hr at 04/25/17 1207     PRN Meds:dextrose 50%, dextrose 50%, diphenhydrAMINE, glucagon (human recombinant), glucose, glucose, insulin aspart, oxycodone, oxycodone, promethazine (PHENERGAN) IVPB, simethicone    Review of patient's allergies " indicates:   Allergen Reactions    Albuterol Palpitations    Colistin Anaphylaxis    Vancomycin analogues      Infusion reaction that does not resolve with slowing    Neupogen [filgrastim] Other (See Comments)     Ostealgia after five daily doses of 300 mcg.      Bactrim [sulfamethoxazole-trimethoprim] Hives    Ceftazidime (anhydrous) Hives     Pt stated can tolerate cefapine not ceftazidime    Dronabinol Other (See Comments)     Mental changes/hallucinations    Haldol [haloperidol lactate] Other (See Comments)     Seizure like activity    Nsaids (non-steroidal anti-inflammatory drug)      Cannot have due to lung transplant    Adhesive Rash     Cloth tape- please use tegaderm or paper tape    Aztreonam Rash    Ciprofloxacin Nausea And Vomiting     Projectile N/V, per patient.  Unwilling to retry therapy.       Objective:     Vital Signs (Most Recent):  Temp: 97.9 °F (36.6 °C) (04/26/17 0415)  Pulse: 89 (04/26/17 0415)  Resp: 18 (04/26/17 0415)  BP: (!) 153/99 (04/26/17 0415)  SpO2: 95 % (04/26/17 0415) Vital Signs (24h Range):  Temp:  [97.7 °F (36.5 °C)-98.5 °F (36.9 °C)] 97.9 °F (36.6 °C)  Pulse:  [] 89  Resp:  [10-18] 18  SpO2:  [94 %-100 %] 95 %  BP: (122-162)/(64-99) 153/99     Weight: 53.5 kg (118 lb)  Body mass index is 23.05 kg/(m^2).  No LMP recorded. Patient is not currently having periods (Reason: Birth Control).    I&O (Last 24H):  I/O last 3 completed shifts:  In: 1720 [P.O.:720; I.V.:1000]  Out: 850 [Urine:850]  I/O this shift:  In: 1280 [I.V.:1280]  Out: 1300 [Urine:1300]     Laboratory:    Recent Labs  Lab 04/26/17 0457   WBC 5.93   HGB 9.6*   HCT 31.1*   MCV 95   PLT 82*       Diagnostic Results:  Labs: Reviewed  No new imaging    Physical Exam:   Constitutional: She is oriented to person, place, and time. She appears well-developed and well-nourished. No distress.    HENT:   Head: Normocephalic and atraumatic.    Eyes: EOM are normal. Pupils are equal, round, and reactive to  light.    Neck: Normal range of motion. Neck supple.    Cardiovascular: Normal rate, regular rhythm, normal heart sounds and intact distal pulses.  Exam reveals no gallop, no friction rub and no edema.    No murmur heard.   Pulmonary/Chest: Effort normal and breath sounds normal. No respiratory distress. She has no wheezes. She has no rales. She exhibits no tenderness.        Abdominal: Soft. Bowel sounds are normal. She exhibits distension and abdominal incision. There is tenderness. There is guarding. There is no rebound.   Patient will not cooperate with light palpation but reports severe tenderness in all quadrants. Abdomen is noted to be soft during ausculation. 4 LSC incisions are c/d/i with steri strips in place. Abdominal distention is mild and appropriate s/p robotic procedure. Patient sitting with multiple emesis bags close by, none with more than 50cc of emesis, mostly saliva.             Musculoskeletal: Normal range of motion and moves all extremeties.       Neurological: She is alert and oriented to person, place, and time.    Skin: Skin is warm and dry. No rash noted. She is not diaphoretic.    Psychiatric: She has a normal mood and affect.       Assessment/Plan:     * Dysmenorrhea  POD #1 s/p RA-TLH  - continue routine advances  - Oxy IR 5/10 mg with IV dilaudid for breakthrough pain  - phenergan q4h PRN, zofran discontinued, per patient request  - h/h stable    Recent Labs  Lab 04/26/17  0457   WBC 5.93   HGB 9.6*   HCT 31.1*   MCV 95   PLT 82*       - Regular diet  - ambulating without difficulty  - encouraged ambulation, IS  - PPX: TEDs and SCDs are in place    Chronic anemia  - likely anemia of chronic disease  - stable h/h  - patient is asymptomatic  - VSS  - continue to monitor as outpatient    Headache  - new headache overnight  - patient requests home fioricet  - 2 tabs ordered at this time      Amy Hutchins MD  Obstetrics & Gynecology  [facility]      Deny Dias  MD

## 2017-04-26 NOTE — NURSING
Md changed pain medication orders to better control patients pain. Increased frequency for pain medication and increased frequency and dosage on antiemetic. Patient not willing to take medications as ordered and re-assess pain, requests to be discharged home. MD notified.

## 2017-04-26 NOTE — ASSESSMENT & PLAN NOTE
- likely anemia of chronic disease  - stable h/h  - patient is asymptomatic  - VSS  - continue to monitor as outpatient

## 2017-04-26 NOTE — SUBJECTIVE & OBJECTIVE
"Interval History:   Patient reports severe nausea overnight and states that she "vomited nonstop". Zofran does not work, but phenergan does improve symptoms. Pain control was difficult overnight as well with pain fluctuating between 8-10 most of the night. Patient was not able to attempt to eat anything. She was able to ambulate to the bathroom. She had to strain to urinate, but was able to void spontaneously eventually. She has passed flatus and had a bowel movement.    RN reports multiple difficult interactions with the patient overnight. States patient never really vomited but did spit into emesis bags overnight. States she saw the patient ambulate with relative ease to and from the bathroom.     Scheduled Meds:   aripiprazole  2 mg Oral Daily    azithromycin  500 mg Oral Every Mon, Wed, Fri    butalbital-acetaminophen-caffeine -40 mg  2 tablet Oral Once    ceFEPIme in dextrose 5%  2 g Intravenous Q8H    dapsone  100 mg Oral Daily    duloxetine  30 mg Oral Daily    gabapentin  600 mg Oral TID    linezolid  600 mg Oral Q12H    montelukast  10 mg Oral Daily    mycophenolate  500 mg Oral BID    predniSONE  5 mg Oral Daily    tacrolimus  0.5 mg Oral BID    tacrolimus  3 mg Oral BID     Continuous Infusions:   lactated ringers 75 mL/hr at 04/25/17 1207     PRN Meds:dextrose 50%, dextrose 50%, diphenhydrAMINE, glucagon (human recombinant), glucose, glucose, insulin aspart, oxycodone, oxycodone, promethazine (PHENERGAN) IVPB, simethicone    Review of patient's allergies indicates:   Allergen Reactions    Albuterol Palpitations    Colistin Anaphylaxis    Vancomycin analogues      Infusion reaction that does not resolve with slowing    Neupogen [filgrastim] Other (See Comments)     Ostealgia after five daily doses of 300 mcg.      Bactrim [sulfamethoxazole-trimethoprim] Hives    Ceftazidime (anhydrous) Hives     Pt stated can tolerate cefapine not ceftazidime    Dronabinol Other (See Comments) "     Mental changes/hallucinations    Haldol [haloperidol lactate] Other (See Comments)     Seizure like activity    Nsaids (non-steroidal anti-inflammatory drug)      Cannot have due to lung transplant    Adhesive Rash     Cloth tape- please use tegaderm or paper tape    Aztreonam Rash    Ciprofloxacin Nausea And Vomiting     Projectile N/V, per patient.  Unwilling to retry therapy.       Objective:     Vital Signs (Most Recent):  Temp: 97.9 °F (36.6 °C) (04/26/17 0415)  Pulse: 89 (04/26/17 0415)  Resp: 18 (04/26/17 0415)  BP: (!) 153/99 (04/26/17 0415)  SpO2: 95 % (04/26/17 0415) Vital Signs (24h Range):  Temp:  [97.7 °F (36.5 °C)-98.5 °F (36.9 °C)] 97.9 °F (36.6 °C)  Pulse:  [] 89  Resp:  [10-18] 18  SpO2:  [94 %-100 %] 95 %  BP: (122-162)/(64-99) 153/99     Weight: 53.5 kg (118 lb)  Body mass index is 23.05 kg/(m^2).  No LMP recorded. Patient is not currently having periods (Reason: Birth Control).    I&O (Last 24H):  I/O last 3 completed shifts:  In: 1720 [P.O.:720; I.V.:1000]  Out: 850 [Urine:850]  I/O this shift:  In: 1280 [I.V.:1280]  Out: 1300 [Urine:1300]     Laboratory:    Recent Labs  Lab 04/26/17  0457   WBC 5.93   HGB 9.6*   HCT 31.1*   MCV 95   PLT 82*       Diagnostic Results:  Labs: Reviewed  No new imaging    Physical Exam:   Constitutional: She is oriented to person, place, and time. She appears well-developed and well-nourished. No distress.    HENT:   Head: Normocephalic and atraumatic.    Eyes: EOM are normal. Pupils are equal, round, and reactive to light.    Neck: Normal range of motion. Neck supple.    Cardiovascular: Normal rate, regular rhythm, normal heart sounds and intact distal pulses.  Exam reveals no gallop, no friction rub and no edema.    No murmur heard.   Pulmonary/Chest: Effort normal and breath sounds normal. No respiratory distress. She has no wheezes. She has no rales. She exhibits no tenderness.        Abdominal: Soft. Bowel sounds are normal. She exhibits  distension and abdominal incision. There is tenderness. There is guarding. There is no rebound.   Patient will not cooperate with light palpation but reports severe tenderness in all quadrants. Abdomen is noted to be soft during ausculation. 5 LSC incisions are c/d/i with steri strips in place. Abdominal distention is mild and appropriate s/p robotic procedure. Patient sitting with multiple emesis bags close by, none with more than 50cc of emesis, mostly saliva.             Musculoskeletal: Normal range of motion and moves all extremeties.       Neurological: She is alert and oriented to person, place, and time.    Skin: Skin is warm and dry. No rash noted. She is not diaphoretic.    Psychiatric: She has a normal mood and affect.

## 2017-04-26 NOTE — NURSING
"Paged MD to report patient requests for IV pain medication and IV antiemetic. Patient aware of plan to transition to PO medications to go home. She states, "I am just not ready, not yet". MD to come assess.   "

## 2017-04-26 NOTE — ASSESSMENT & PLAN NOTE
POD #1 s/p RA-TLH  - continue routine advances  - Oxy IR 5/10 mg with IV dilaudid for breakthrough pain  - phenergan q4h PRN, zofran discontinued, per patient request  - h/h stable    Recent Labs  Lab 04/26/17  0457   WBC 5.93   HGB 9.6*   HCT 31.1*   MCV 95   PLT 82*       - Regular diet  - ambulating without difficulty  - encouraged ambulation, IS  - PPX: TEDs and SCDs are in place

## 2017-04-26 NOTE — PLAN OF CARE
Eager for discharge. Verbal understanding of discharge instructions-- paperwork passed and explained to patient and family. Prescriptions filled in outpatient pharmacy on site.  IV removed from left hand w/ cath tip intact, no redness or edema, placed pressure dressing with Coban and gauze.  Free from falls, injury, or skin breakdown this hospital admission. Vital signs stable and afebrile throughout shift. Lap sites to abdomen are clean, dry, and intact. Pain has been moderately controlled by PO pain medication, well tolerated. Tolerating PO intake and voiding spontaneously. Ambulating without difficulty. Call light is within reach, bed alarm on, bed in lowest position with brakes on, side rails up x2, family at bedside, TEDs/SCDs on, IS at bedside, and nonskid socks on. Pt lying in bed in no distress.  To be DCd home w/ mother in law-- will be escorted downstairs via wheelchair and transport team once dressed, ready & ride arrives.

## 2017-04-26 NOTE — DISCHARGE SUMMARY
[facility]  Obstetrics & Gynecology  Discharge Summary    Patient Name: Juanita Ibarra  MRN: [id number]  Admission Date: 4/25/2017  Hospital Length of Stay: 0 days  Discharge Date and Time:  04/26/2017 3:21 PM  Attending Physician: Deny Dias Jr., MD   Discharging Provider: Amy Hutchins MD  Primary Care Provider: Jake Alvarez MD    HPI:  Juanita Ibarra is a 27 y.o. On POD #1 s/p ROBOTIC ASSISTED LAPAROSCOPIC HYSTERECTOMY for severe dysmenorrhea    Patient with history of CF s/p lung transplant.    Hospital Course:  Patient presented for scheduled procedure. Patient was passed back to OR for RA-TLH. Please see OP note for further details. Tolerated procedure well and patient was taken to recovery in a stable condition. Prior to discharge patient was able to void, ambulate, tolerate PO and pain was well controlled with PO meds. Patient was given routine post-op instructions for which patient voiced understanding. Patient was subsequently discharged home.      Procedure(s) (LRB):  ROBOTIC ASSISTED LAPAROSCOPIC HYSTERECTOMY (N/A)     Significant Diagnostic Studies: Labs:   CBC   Recent Labs  Lab 04/26/17  0457   WBC 5.93   HGB 9.6*   HCT 31.1*   PLT 82*       Pending Diagnostic Studies:     None        Final Active Diagnoses:    Diagnosis Date Noted POA    PRINCIPAL PROBLEM:  Dysmenorrhea [N94.6] 04/25/2017 Yes    Headache [R51] 11/13/2014 Yes    Chronic anemia [D64.9] 09/05/2014 Yes      Problems Resolved During this Admission:    Diagnosis Date Noted Date Resolved POA        Discharged Condition: good    Disposition: Home or Self Care    Follow Up:  Follow-up Information     Follow up with Deny Dias Jr, MD In 6 weeks.    Specialties:  Obstetrics, Obstetrics and Gynecology    Why:  Post-op checkup    Contact information:    [address] [address] [phone]          Patient Instructions:     Diet general     Activity as tolerated      Call MD for:  increased confusion or weakness     Call MD for:  persistent dizziness, light-headedness, or visual disturbances     Call MD for:  worsening rash     Call MD for:  severe persistent headache     Call MD for:  difficulty breathing or increased cough     Call MD for:  severe uncontrolled pain     Call MD for:  persistent nausea and vomiting or diarrhea     Call MD for:  temperature >100.4     Medications:  Reconciled Home Medications:   Current Discharge Medication List      START taking these medications    Details   oxycodone (ROXICODONE) 5 MG immediate release tablet Take 1 tablet (5 mg total) by mouth every 4 (four) hours as needed for Pain.  Qty: 30 tablet, Refills: 0    Associated Diagnoses: Dysmenorrhea; S/P hysterectomy         CONTINUE these medications which have NOT CHANGED    Details   aripiprazole (ABILIFY) 2 MG Tab Take 2 mg by mouth once daily.      azithromycin (ZITHROMAX) 500 MG tablet Take 1 tablet (500 mg total) by mouth every Mon, Wed, Fri.  Qty: 15 tablet, Refills: 11      butalbital-acetaminophen-caffeine -40 mg (FIORICET, ESGIC) -40 mg per tablet TAKE 1 TABLET BY MOUTH EVERY 6 HOURS AS NEEDED FOR HEADACHE  Qty: 120 tablet, Refills: 0      calcium-vitamin D3 500 mg(1,250mg) -200 unit per tablet Take 1 tablet by mouth 2 (two) times daily with meals.  Qty: 60 tablet, Refills: 11    Associated Diagnoses: Osteopenia      ceFEPIme in dextrose 5% (MAXIPIME) 2 gram/50 mL PgBk Inject 50 mLs (2 g total) into the vein every 8 (eight) hours.  Qty: 1 each, Refills: 0      ciprofloxacin-dexamethasone 0.3-0.1% (CIPRODEX) 0.3-0.1 % DrpS Place 4 drops into the right ear 2 (two) times daily.  Qty: 7.5 mL, Refills: 0      CREON 24,000-76,000 -120,000 unit capsule TAKE 5 CAPSULES BY MOUTH WITH MEALS AND 4 CAPSULES WITH SNACKS EVERYDAY  Qty: 810 capsule, Refills: 0    Associated Diagnoses: Cystic fibrosis with gastrointestinal manifestations      dapsone 100 MG Tab TAKE 1 TABLET BY  MOUTH EVERY DAY  Qty: 30 tablet, Refills: 11    Associated Diagnoses: Need for pneumocystis prophylaxis      diphenhydrAMINE (BENADRYL) 50 MG tablet Take 1 tablet (50 mg total) by mouth every 6 (six) hours as needed for Itching.  Qty: 1 tablet, Refills: 0      duloxetine (CYMBALTA) 30 MG capsule Take 30 mg by mouth once daily.      fexofenadine (ALLEGRA) 180 MG tablet Take 180 mg by mouth once daily.      folic acid (FOLVITE) 1 MG tablet TAKE 1 TABLET BY MOUTH EVERY DAY  Qty: 30 tablet, Refills: 11    Associated Diagnoses: Folate deficiency      gabapentin (NEURONTIN) 300 MG capsule Take 1 capsule (300 mg total) by mouth 3 (three) times daily.  Qty: 90 capsule, Refills: 11    Associated Diagnoses: Chronic pain syndrome; Muscle spasm of back      insulin glargine, TOUJEO, (TOUJEO) 300 unit/mL (1.5 mL) InPn pen Inject 5 Units into the skin once daily.      levalbuterol (XOPENEX HFA) 45 mcg/actuation inhaler INHALE 1 TO 2 PUFFS INTO THE LUNGS EVERY 6 HOURS AS NEEDED FOR WHEEZING OR SHORTNESS OF BREATH. USE WITH SPACER.  Qty: 15 g, Refills: 0    Associated Diagnoses: Wheezing      linezolid (ZYVOX) 600 mg Tab Take 1 tablet (600 mg total) by mouth every 12 (twelve) hours.  Qty: 28 tablet, Refills: 0      magnesium oxide (MAG-OX) 400 mg tablet Take 1 tablet (400 mg total) by mouth 2 (two) times daily.  Qty: 60 tablet, Refills: 11    Associated Diagnoses: Hypomagnesemia      mometasone-formoterol 100-5 mcg/actuation HFAA Inhale 1 puff into the lungs 2 (two) times daily.      montelukast (SINGULAIR) 10 mg tablet TAKE 1 TABLET BY MOUTH EVERY DAY  Qty: 30 tablet, Refills: 11    Associated Diagnoses: Allergic rhinitis      morphine (MS CONTIN) 15 MG 12 hr tablet Take 15 mg by mouth 2 (two) times daily.      mv. min cmb#[id number] (AQUADEKS) 100-700-10 mcg-mcg-mg Cap cap Take 1 capsule by mouth 2 (two) times daily.  Qty: 60 capsule, Refills: 11    Associated Diagnoses: Vitamin deficiency      mycophenolate (CELLCEPT) 250 mg  Cap Take 2 capsules (500 mg total) by mouth 2 (two) times daily.  Qty: 120 capsule, Refills: 11    Associated Diagnoses: Lung replaced by transplant      omeprazole (PRILOSEC) 40 MG capsule TAKE 1 CAPSULE BY MOUTH EVERY MORNING  Qty: 30 capsule, Refills: 11    Associated Diagnoses: Gastroesophageal reflux disease without esophagitis      ondansetron (ZOFRAN) 8 MG tablet Take 1 tablet (8 mg total) by mouth every 8 (eight) hours as needed.  Qty: 30 tablet, Refills: 1    Associated Diagnoses: Nausea      predniSONE (DELTASONE) 5 MG tablet Take 5 mg by mouth once daily.      promethazine (PHENERGAN) 25 MG tablet TAKE 1 TABLET BY MOUTH EVERY 8 HOURS AS NEEDED FOR NAUSEA  Qty: 45 tablet, Refills: 0      !! tacrolimus (PROGRAF) 0.5 MG Cap Take 1 capsule (0.5 mg total) by mouth every 12 (twelve) hours.  Qty: 60 capsule, Refills: 11    Associated Diagnoses: Lung replaced by transplant      !! tacrolimus (PROGRAF) 1 MG Cap Take 3 capsules (3 mg total) by mouth every 12 (twelve) hours. Daily doses: 3.5 mg every 12 hours.  Qty: 180 capsule, Refills: 11    Associated Diagnoses: Lung replaced by transplant      tizanidine (ZANAFLEX) 4 MG tablet TAKE 2 TABLETS BY MOUTH EVERY 6 HOURS AS NEEDED  Qty: 90 tablet, Refills: 0      aluminum-magnesium hydroxide-simethicone (MAALOX ADVANCED) 200-200-20 mg/5 mL Susp Take 30 mLs by mouth before meals and at bedtime as needed.  Qty: 148 mL, Refills: 2      lorazepam (ATIVAN) 2 MG Tab Take 1 tablet (2 mg total) by mouth every 12 (twelve) hours as needed (for anxiety).  Qty: 60 tablet, Refills: 3    Associated Diagnoses: Anxiety      polyethylene glycol (GLYCOLAX) 17 gram PwPk Take 17 g by mouth 2 (two) times daily.  Qty: 60 packet, Refills: 11      PROCHAMBER USE AS DIRECTED  Qty: 1 Device, Refills: 0    Associated Diagnoses: Uses nebulizer and inhaler at home       !! - Potential duplicate medications found. Please discuss with provider.          Amy Hutchins MD   Obstetrics &  Gynecology  [facility] Dias MD

## 2017-04-26 NOTE — NURSING
MD at bedside assessing pain and nausea/vomiting. Patient has not produced any emesis this shift.

## 2017-04-27 ENCOUNTER — TELEPHONE (OUTPATIENT)
Dept: OTOLARYNGOLOGY | Facility: CLINIC | Age: 27
End: 2017-04-27

## 2017-04-27 NOTE — TELEPHONE ENCOUNTER
Spoke to patient. Appt rescheduled as patient says will not be able to come in because of recent surgery. No verbalized complaints. Call PRN.

## 2017-04-27 NOTE — OP NOTE
DATE OF PROCEDURE:  04/25/2017.    SURGEON:  Deny Dias Jr., M.D.    ASSISTANT:  Tashia Colmenares, Nurse Practitioner, was available at the bedside   due to no qualified resident or staff.    SECOND ASSISTANT:  At the console, Heath Sadler M.D. (RES).    PREOPERATIVE DIAGNOSIS:  Severe dysmenorrhea.    POSTOPERATIVE DIAGNOSIS:  Severe dysmenorrhea.    ANESTHESIA:  General endotracheal.    OPERATIVE PROCEDURES:  1.  Da Elza laparoscopic hysterectomy.  2.  Cystoscopy.    OPERATIVE FINDINGS:  The pelvis was normal and free of any adhesive or other   organic disease.  Upper abdomen demonstrated an adhesion of the stomach to the   abdominal wall.    PROCEDURE IN DETAIL:  After obtaining adequate informed consent, the patient was   taken to Operating Room and placed under general endotracheal anesthesia.  The   patient also had arterial line placed by Anesthesia.  The patient was placed in   the dorsal lithotomy position in the [facility].  The patient was prepped   and draped in the usual sterile manner.  Attention was brought to the vagina.    A weighted speculum placed in the posterior aspect of the vagina.  The cervix   was grasped with a single-tooth tenaculum and the uterus was sounded to 7 cm.    Stay sutures were placed at 12 and 6 o'clock.  The single-tooth tenaculum was   removed and a 3 cm EARL cup as well as a 6 cm manipulator were inserted into the   uterus and retained.  An occluder balloon was inflated in the vagina.  Goldberg   catheter was also placed.  Attention was then brought to the abdomen.  The   abdomen was tented up and the infraumbilical fold was discovered and a Veress   needle was placed.  Saline drop test was confirmatory and the abdomen was filled   with carbon dioxide.  An 8 mm umbilical incision was made and a trocar and   sleeve were introduced into the intraabdominal cavity.  Laparoscopic   confirmation of location was performed.  There was no injury at the insertion   site.  See  the above operative findings.  A trocar was placed lateral to this in   the right and left quadrants under direct laparoscopic visualization and an 8   mm trocar was placed in the left upper quadrant, again under direct fluoroscopic   visualization.  The robot was then docked and operative procedure was begun.    The left round ligament was discovered and was severed with monopolar cautery,   entering the anterior leaf of the broad ligament and extending it to the   anterior aspect of the uterus, creating a boaz-bladder flap.  Attention was then   brought to the utero-ovarian ligament, which was bipolar cauterized and   transected and the posterior leaf of the broad ligament was skeletonized off of   the uterus.  The uterine arteries were then bipolar cauterized.  The same   operative procedure was performed on the opposite side with the same excellent   result.  Attention was brought to the posterior aspect of the cul-de-sac and a   colpotomy incision was made and extended to the left to the uterine arteries,   which were again coagulated and severed and extended to the anterior aspect of   the uterus, creating a boaz-colpotomy.  The same procedure was performed on the   opposite side with the same excellent result and the uterus and cervix were   released from the vagina and removed through the vagina.  Vaginal packing was   placed in the vagina to maintain pneumoperitoneum.  The vagina was then closed   with three 0 Vicryl sutures in a horizontal mattress fashion.  Hemostasis was   excellent.  Vitagel was placed over the rough peritoneal edges.  Instruments   were removed and the robot was undocked.  The Goldberg was removed and cystoscopy   was performed.  There was no bladder injury noted and bilateral ureters were   effluxing as per normal.  Goldberg was replaced.  Vaginal pack was removed from the   vagina.  A second look with the laparoscope revealed great hemostasis and   trocars were removed from the abdominal  cavity and the incisions were closed   with 4-0 Monocryl in a subcuticular manner.  The patient was taken to Recovery   Room in stable condition.  The patient tolerated the procedure well.      ARTUR  dd: 04/27/2017 10:59:02 (CDT)  td: 04/27/2017 15:36:58 (CDT)  Doc ID   #[id number]  Job ID #[id number]      Deny Dias MD

## 2017-05-02 RX ORDER — TIZANIDINE 4 MG/1
TABLET ORAL
Qty: 90 TABLET | Refills: 0 | Status: SHIPPED | OUTPATIENT
Start: 2017-05-02 | End: 2017-05-31 | Stop reason: SDUPTHER

## 2017-05-03 LAB
FUNGUS SPEC CULT: NORMAL
FUNGUS SPEC CULT: NORMAL

## 2017-05-06 DIAGNOSIS — R06.2 WHEEZING: ICD-10-CM

## 2017-05-07 RX ORDER — LEVALBUTEROL TARTRATE 45 UG/1
AEROSOL, METERED ORAL
Qty: 15 G | Refills: 0 | Status: SHIPPED | OUTPATIENT
Start: 2017-05-07 | End: 2017-06-29 | Stop reason: SDUPTHER

## 2017-05-09 ENCOUNTER — OFFICE VISIT (OUTPATIENT)
Dept: OTOLARYNGOLOGY | Facility: CLINIC | Age: 27
End: 2017-05-09
Payer: MEDICAID

## 2017-05-09 VITALS
WEIGHT: 121.25 LBS | HEART RATE: 98 BPM | BODY MASS INDEX: 23.81 KG/M2 | HEIGHT: 60 IN | DIASTOLIC BLOOD PRESSURE: 98 MMHG | SYSTOLIC BLOOD PRESSURE: 143 MMHG

## 2017-05-09 DIAGNOSIS — Z94.2 LUNG TRANSPLANT STATUS, BILATERAL: Primary | ICD-10-CM

## 2017-05-09 DIAGNOSIS — E78.5 DYSLIPIDEMIA: ICD-10-CM

## 2017-05-09 DIAGNOSIS — J32.0 CHRONIC MAXILLARY SINUSITIS: Primary | ICD-10-CM

## 2017-05-09 DIAGNOSIS — J33.9 NASAL POLYPOSIS: ICD-10-CM

## 2017-05-09 DIAGNOSIS — T50.905A DRUG-INDUCED OSTEOPOROSIS: ICD-10-CM

## 2017-05-09 DIAGNOSIS — E84.0 CYSTIC FIBROSIS WITH PULMONARY MANIFESTATIONS: ICD-10-CM

## 2017-05-09 DIAGNOSIS — E84.8 DIABETES MELLITUS RELATED TO CYSTIC FIBROSIS: ICD-10-CM

## 2017-05-09 DIAGNOSIS — E08.9 DIABETES MELLITUS RELATED TO CYSTIC FIBROSIS: ICD-10-CM

## 2017-05-09 DIAGNOSIS — Z94.2 LUNG REPLACED BY TRANSPLANT: ICD-10-CM

## 2017-05-09 DIAGNOSIS — E56.9 VITAMIN DEFICIENCY: ICD-10-CM

## 2017-05-09 DIAGNOSIS — M81.8 DRUG-INDUCED OSTEOPOROSIS: ICD-10-CM

## 2017-05-09 DIAGNOSIS — E83.42 HYPOMAGNESEMIA: ICD-10-CM

## 2017-05-09 PROCEDURE — 99212 OFFICE O/P EST SF 10 MIN: CPT | Mod: 25,S$PBB,, | Performed by: OTOLARYNGOLOGY

## 2017-05-09 PROCEDURE — 99999 PR PBB SHADOW E&M-EST. PATIENT-LVL III: CPT | Mod: PBBFAC,,, | Performed by: OTOLARYNGOLOGY

## 2017-05-09 PROCEDURE — 31237 NSL/SINS NDSC SURG BX POLYPC: CPT | Mod: 50,PBBFAC | Performed by: OTOLARYNGOLOGY

## 2017-05-09 PROCEDURE — 99213 OFFICE O/P EST LOW 20 MIN: CPT | Mod: PBBFAC | Performed by: OTOLARYNGOLOGY

## 2017-05-09 RX ORDER — BUTALBITAL, ACETAMINOPHEN AND CAFFEINE 50; 325; 40 MG/1; MG/1; MG/1
TABLET ORAL
Qty: 120 TABLET | Refills: 0 | Status: SHIPPED | OUTPATIENT
Start: 2017-05-09 | End: 2017-06-29 | Stop reason: SDUPTHER

## 2017-05-09 NOTE — PROCEDURES
Nasal/sinus endoscopy  Date/Time: 5/9/2017 2:41 PM  Performed by: RUDI LARRY  Authorized by: RUDI LARRY     Consent Done?:  Yes (Verbal)  Anesthesia:     Local anesthetic:  Lidocaine 4% and Tushar-Synephrine 1/2%    Patient tolerance:  Patient tolerated the procedure well with no immediate complications  Nose:     Procedure Performed:  Removal of Debridement  External:      No external nasal deformity  Intranasal:      Mucosa no polyps     Mucosa ulcers not present     No mucosa lesions present     Turbinates not enlarged     No septum gross deformity  Nasopharynx:      No mucosa lesions     Adenoids not present     Posterior choanae patent     Eustachian tube patent     Debridement of both ethmoid cavities performed with Mariee forceps.  Sinuses otherwise patent, very clean.  Propel stents dissolving and removed.

## 2017-05-09 NOTE — PROGRESS NOTES
Subjective:      Juanita Ibarra is a 27 y.o. female who comes for follow-up 5 weeks status-post endoscopic sinus surgery.  Had urgent right M&T by Dr. Null for AOM 12 days postop, which is a rare occurrence for her.  Has been on IV antibiotics for most of the past month, including meropenem.  Breathing fine, minimal headache, no purulent nasal discharge.  Using sinus rinse daily up until 2 weeks ago, now limited use because postop from hysterectomy.    QOL assessment deferred.      Objective:     BP (!) 143/98  Pulse 98  Ht 5' (1.524 m)  Wt 55 kg (121 lb 4.1 oz)  BMI 23.68 kg/m2     General:   not in distress   Nasal:  edematous mucosa   no septal hematoma   no bleeding   Oral Cavity:   clear   Oropharynx:   no bleeding   Neck:   nontender       Procedure    Endoscopic debridement performed.  See procedure note.     Left middle meatus before debridement     Left middle meatus after debridement     Right middle meatus before debridement     Right middle meatus after debridement        Data Reviewed    Pathology report indicated non-eosinophilic chronic inflammation.  Cultures showed MRSA.      Assessment:     Doing well following bilateral endoscopic sinus surgery.       1. Chronic maxillary sinusitis    2. Cystic fibrosis with pulmonary manifestations    3. Nasal polyposis    4. Lung replaced by transplant         Plan:     Sinuses look great today.  Continue saline rinse at least daily.  Return in about 2 months (around 7/9/2017).

## 2017-05-15 LAB — FUNGUS SPEC CULT: NORMAL

## 2017-05-16 DIAGNOSIS — E84.19 CYSTIC FIBROSIS WITH GASTROINTESTINAL MANIFESTATIONS: ICD-10-CM

## 2017-05-16 RX ORDER — PANCRELIPASE 24000; 76000; 120000 [USP'U]/1; [USP'U]/1; [USP'U]/1
CAPSULE, DELAYED RELEASE PELLETS ORAL
Qty: 810 CAPSULE | Refills: 0 | Status: SHIPPED | OUTPATIENT
Start: 2017-05-16 | End: 2017-06-10 | Stop reason: SDUPTHER

## 2017-05-23 DIAGNOSIS — Z94.2 LUNG REPLACED BY TRANSPLANT: ICD-10-CM

## 2017-05-23 DIAGNOSIS — Z29.89 NEED FOR PNEUMOCYSTIS PROPHYLAXIS: ICD-10-CM

## 2017-05-23 DIAGNOSIS — K21.9 GASTROESOPHAGEAL REFLUX DISEASE WITHOUT ESOPHAGITIS: ICD-10-CM

## 2017-05-23 DIAGNOSIS — J30.9 ALLERGIC RHINITIS: ICD-10-CM

## 2017-05-23 RX ORDER — MONTELUKAST SODIUM 10 MG/1
TABLET ORAL
Qty: 30 TABLET | Refills: 11 | Status: SHIPPED | OUTPATIENT
Start: 2017-05-23 | End: 2017-12-21 | Stop reason: SDUPTHER

## 2017-05-23 RX ORDER — PREDNISONE 5 MG/1
TABLET ORAL
Qty: 30 TABLET | Refills: 11 | Status: SHIPPED | OUTPATIENT
Start: 2017-05-23 | End: 2017-06-29 | Stop reason: SDUPTHER

## 2017-05-23 RX ORDER — OMEPRAZOLE 40 MG/1
CAPSULE, DELAYED RELEASE ORAL
Qty: 30 CAPSULE | Refills: 11 | Status: SHIPPED | OUTPATIENT
Start: 2017-05-23 | End: 2017-12-21 | Stop reason: SDUPTHER

## 2017-05-23 RX ORDER — DAPSONE 100 MG/1
TABLET ORAL
Qty: 30 TABLET | Refills: 11 | Status: SHIPPED | OUTPATIENT
Start: 2017-05-23 | End: 2017-12-21 | Stop reason: SDUPTHER

## 2017-05-31 RX ORDER — TIZANIDINE 4 MG/1
TABLET ORAL
Qty: 90 TABLET | Refills: 0 | Status: SHIPPED | OUTPATIENT
Start: 2017-05-31 | End: 2017-06-10 | Stop reason: SDUPTHER

## 2017-06-05 LAB
ACID FAST MOD KINY STN SPEC: NORMAL
ACID FAST MOD KINY STN SPEC: NORMAL
MYCOBACTERIUM SPEC QL CULT: NORMAL
MYCOBACTERIUM SPEC QL CULT: NORMAL

## 2017-06-09 ENCOUNTER — OFFICE VISIT (OUTPATIENT)
Dept: OBSTETRICS AND GYNECOLOGY | Facility: CLINIC | Age: 27
End: 2017-06-09
Payer: MEDICAID

## 2017-06-09 VITALS
DIASTOLIC BLOOD PRESSURE: 70 MMHG | BODY MASS INDEX: 23.75 KG/M2 | WEIGHT: 121 LBS | SYSTOLIC BLOOD PRESSURE: 114 MMHG | HEIGHT: 60 IN

## 2017-06-09 DIAGNOSIS — Z98.890 POST-OPERATIVE STATE: Primary | ICD-10-CM

## 2017-06-09 PROCEDURE — 99024 POSTOP FOLLOW-UP VISIT: CPT | Mod: ,,, | Performed by: OBSTETRICS & GYNECOLOGY

## 2017-06-09 PROCEDURE — 99214 OFFICE O/P EST MOD 30 MIN: CPT | Mod: PBBFAC | Performed by: OBSTETRICS & GYNECOLOGY

## 2017-06-09 PROCEDURE — 99999 PR PBB SHADOW E&M-EST. PATIENT-LVL IV: CPT | Mod: PBBFAC,,, | Performed by: OBSTETRICS & GYNECOLOGY

## 2017-06-09 NOTE — PROGRESS NOTES
PT HERE S/P [facility]. FEELS GREAT.      FINAL PATHOLOGIC DIAGNOSIS  UTERUS AND CERVIX WEIGHING 37 G SHOWING:  INACTIVE ENDOMETRIUM  CERVIX WITH FOCAL CHRONIC INFLAMMATION    ROS:  GENERAL: No fever, chills, fatigability or weight loss.  VULVAR: No pain, no lesions and no itching.  VAGINAL: No relaxation, no itching, no discharge, no abnormal bleeding and no lesions.  ABDOMEN: No abdominal pain. Denies nausea. Denies vomiting. No diarrhea. No constipation  BREAST: Denies pain. No lumps. No discharge.  URINARY: No incontinence, no nocturia, no frequency and no dysuria.  CARDIOVASCULAR: No chest pain. No shortness of breath. No leg cramps.  NEUROLOGICAL: No headaches. No vision changes.  The remainder of the review of systems was negative.    PE:   General Appearance: normal weight Well developed. Well nourished. In no acute distress.  Urethral Meatus: Normal size. Normal location. No lesions. No prolapse.  Vulva: Atrophic. Lesions: No.  Urethra: No masses. No tenderness. No prolapse. No scarring.  Bladder: No masses. No tenderness.  Vagina: Mucosa NI: yes Discharge: no Atrophic: no Rectocele: no Cystocele: no Vaginal cuff intact: yes  Cervix: Absent.  Uterus: Absent.  Adnexa: Masses: No Tenderness: No       CDS Nodularity: No   Abdomen: normal weight  No masses. No tenderness.  SKIN INCISIONS HEALED    PROCEDURES:    DIAGNOSIS:  1. Post-operative state        PLAN:     MEDICATIONS & ORDERS:       Patient was counseled today on the new ACS guidelines for cervical cytology screening as well as the current recommendations for breast cancer screening. She was counseled to follow up with her PCP for other routine health maintenance. Counseling session lasted approximately 10 minutes, and all her questions were answered.         FOLLOW-UP: With me in 24 month

## 2017-06-10 DIAGNOSIS — E84.19 CYSTIC FIBROSIS WITH GASTROINTESTINAL MANIFESTATIONS: ICD-10-CM

## 2017-06-13 RX ORDER — TIZANIDINE 4 MG/1
TABLET ORAL
Qty: 90 TABLET | Refills: 0 | Status: SHIPPED | OUTPATIENT
Start: 2017-06-13 | End: 2017-07-13 | Stop reason: SDUPTHER

## 2017-06-13 RX ORDER — PANCRELIPASE 24000; 76000; 120000 [USP'U]/1; [USP'U]/1; [USP'U]/1
CAPSULE, DELAYED RELEASE PELLETS ORAL
Qty: 810 CAPSULE | Refills: 0 | Status: SHIPPED | OUTPATIENT
Start: 2017-06-13 | End: 2017-09-13 | Stop reason: SDUPTHER

## 2017-06-18 LAB
ACID FAST MOD KINY STN SPEC: NORMAL
MYCOBACTERIUM SPEC QL CULT: NORMAL

## 2017-06-25 DIAGNOSIS — B37.31 VAGINAL YEAST INFECTION: ICD-10-CM

## 2017-06-25 RX ORDER — FLUCONAZOLE 150 MG/1
TABLET ORAL
Qty: 30 TABLET | Refills: 0 | Status: SHIPPED | OUTPATIENT
Start: 2017-06-25 | End: 2018-08-12 | Stop reason: SDUPTHER

## 2017-06-29 ENCOUNTER — OFFICE VISIT (OUTPATIENT)
Dept: OTOLARYNGOLOGY | Facility: CLINIC | Age: 27
End: 2017-06-29
Payer: MEDICAID

## 2017-06-29 ENCOUNTER — HOSPITAL ENCOUNTER (OUTPATIENT)
Dept: RADIOLOGY | Facility: HOSPITAL | Age: 27
Discharge: HOME | End: 2017-06-29
Attending: INTERNAL MEDICINE
Payer: MEDICAID

## 2017-06-29 ENCOUNTER — HOSPITAL ENCOUNTER (OUTPATIENT)
Dept: RADIOLOGY | Facility: CLINIC | Age: 27
Discharge: HOME | End: 2017-06-29
Attending: INTERNAL MEDICINE
Payer: MEDICAID

## 2017-06-29 ENCOUNTER — HOSPITAL ENCOUNTER (OUTPATIENT)
Dept: PULMONOLOGY | Facility: CLINIC | Age: 27
Discharge: HOME | End: 2017-06-29
Payer: MEDICAID

## 2017-06-29 ENCOUNTER — OFFICE VISIT (OUTPATIENT)
Dept: TRANSPLANT | Facility: CLINIC | Age: 27
End: 2017-06-29
Payer: MEDICAID

## 2017-06-29 VITALS
SYSTOLIC BLOOD PRESSURE: 125 MMHG | HEART RATE: 118 BPM | RESPIRATION RATE: 20 BRPM | TEMPERATURE: 98 F | BODY MASS INDEX: 23.03 KG/M2 | DIASTOLIC BLOOD PRESSURE: 88 MMHG | WEIGHT: 122 LBS | HEIGHT: 61 IN | OXYGEN SATURATION: 96 %

## 2017-06-29 DIAGNOSIS — M81.8 DRUG-INDUCED OSTEOPOROSIS: ICD-10-CM

## 2017-06-29 DIAGNOSIS — Z94.2 LUNG REPLACED BY TRANSPLANT: ICD-10-CM

## 2017-06-29 DIAGNOSIS — Z48.298 ENCOUNTER FOLLOWING ORGAN TRANSPLANT: Primary | ICD-10-CM

## 2017-06-29 DIAGNOSIS — R52 VISCERAL PAIN: ICD-10-CM

## 2017-06-29 DIAGNOSIS — Z79.2 PROPHYLACTIC ANTIBIOTIC: ICD-10-CM

## 2017-06-29 DIAGNOSIS — J32.0 CHRONIC MAXILLARY SINUSITIS: ICD-10-CM

## 2017-06-29 DIAGNOSIS — J44.81 BRONCHIOLITIS OBLITERANS SYNDROME: ICD-10-CM

## 2017-06-29 DIAGNOSIS — D84.9 IMMUNOSUPPRESSION: ICD-10-CM

## 2017-06-29 DIAGNOSIS — J33.9 NASAL POLYPOSIS: ICD-10-CM

## 2017-06-29 DIAGNOSIS — T86.818 OTHER COMPLICATIONS OF LUNG TRANSPLANT: Primary | ICD-10-CM

## 2017-06-29 DIAGNOSIS — T50.905A DRUG-INDUCED OSTEOPOROSIS: ICD-10-CM

## 2017-06-29 DIAGNOSIS — E84.0 CYSTIC FIBROSIS WITH PULMONARY MANIFESTATIONS: Primary | ICD-10-CM

## 2017-06-29 LAB
PRE FEV1 FVC: 40
PRE FEV1: 0.88
PRE FVC: 2.2
PREDICTED FEV1 FVC: 88
PREDICTED FEV1: 2.82
PREDICTED FVC: 3.24

## 2017-06-29 PROCEDURE — 31237 NSL/SINS NDSC SURG BX POLYPC: CPT | Mod: 50,PBBFAC | Performed by: OTOLARYNGOLOGY

## 2017-06-29 PROCEDURE — 99212 OFFICE O/P EST SF 10 MIN: CPT | Mod: 25,S$PBB,, | Performed by: OTOLARYNGOLOGY

## 2017-06-29 PROCEDURE — 71020 XR CHEST PA AND LATERAL: CPT | Mod: 26,,, | Performed by: RADIOLOGY

## 2017-06-29 PROCEDURE — 77080 DXA BONE DENSITY AXIAL: CPT | Mod: 26,,, | Performed by: INTERNAL MEDICINE

## 2017-06-29 PROCEDURE — 99214 OFFICE O/P EST MOD 30 MIN: CPT | Mod: 25,S$PBB,, | Performed by: INTERNAL MEDICINE

## 2017-06-29 PROCEDURE — 94010 BREATHING CAPACITY TEST: CPT | Mod: 26,S$PBB,, | Performed by: INTERNAL MEDICINE

## 2017-06-29 PROCEDURE — 99211 OFF/OP EST MAY X REQ PHY/QHP: CPT | Mod: PBBFAC,25 | Performed by: OTOLARYNGOLOGY

## 2017-06-29 PROCEDURE — 99999 PR PBB SHADOW E&M-EST. PATIENT-LVL I: CPT | Mod: PBBFAC,,, | Performed by: OTOLARYNGOLOGY

## 2017-06-29 PROCEDURE — 99999 PR PBB SHADOW E&M-EST. PATIENT-LVL III: CPT | Mod: PBBFAC,,, | Performed by: INTERNAL MEDICINE

## 2017-06-29 RX ORDER — AZITHROMYCIN 500 MG/1
500 TABLET, FILM COATED ORAL
Qty: 12 TABLET | Refills: 11 | Status: SHIPPED | OUTPATIENT
Start: 2017-06-30 | End: 2017-11-13 | Stop reason: ALTCHOICE

## 2017-06-29 RX ORDER — OXYCODONE HCL 10 MG/1
10 TABLET, FILM COATED, EXTENDED RELEASE ORAL EVERY 12 HOURS
COMMUNITY
End: 2017-09-25 | Stop reason: ALTCHOICE

## 2017-06-29 NOTE — LETTER
July 11, 2017    Shama Dennison MD  [address]  [address]  [facility]  [place] [address]  Phone: [phone]  Fax: [phone]        Dear Dr. Juma Ibarra has reached her 3 rd year anniversary following lung  transplantation.  Attached is the summary of the patients most recent  assessment and plan of care.  Our lung transplant team appreciates your referral  of Juanita Ibarra  and we look forward to continued patient  collaboration with you.     Sincerely,           Jake Alvarez MD    Director, Lung Transplantation    Pulmonary & Critical Care Medicine     [facility]   [address] [place] [id number]   [phone]     RR/kp

## 2017-06-29 NOTE — PROGRESS NOTES
Subjective:      Juanita Ibarra is a 27 y.o. female who comes for follow-up nearly 3 months status-post endoscopic sinus surgery.  Doing fine, breathing well, minimal mucus discharge or drainage.  Using saline and flonase daily.    SNOT-22 score = 31, NOSE score = 35%      Objective:     LMP 10/02/2016      General:   not in distress   Nasal:  edematous mucosa   no septal hematoma   no bleeding   Oral Cavity:   clear   Oropharynx:   no bleeding   Neck:   nontender       Procedure    Endoscopic debridement performed.  See procedure note.        Data Reviewed    Pathology report indicated non-eosinophilic chronic inflammation.  Cultures showed MRSA.      Assessment:     Doing well following bilateral endoscopic sinus surgery.       1. Chronic maxillary sinusitis    2. Cystic fibrosis with pulmonary manifestations    3. Nasal polyposis    4. Lung replaced by transplant         Plan:     Continue saline and flonase daily.  Return in about 3 months (around 9/29/2017).

## 2017-06-29 NOTE — PROCEDURES
Nasal/sinus endoscopy  Date/Time: 6/29/2017 1:58 PM  Performed by: RUDI LARRY  Authorized by: RUDI LARRY     Consent Done?:  Yes (Verbal)  Anesthesia:     Local anesthetic:  Lidocaine 4% and Tushar-Synephrine 1/2%    Patient tolerance:  Patient tolerated the procedure well with no immediate complications  Nose:     Procedure Performed:  Removal of Debridement  External:      No external nasal deformity  Intranasal:      Mucosa no polyps     Mucosa ulcers not present     No mucosa lesions present     Turbinates not enlarged     No septum gross deformity  Nasopharynx:      No mucosa lesions     Adenoids not present     Posterior choanae patent     Eustachian tube patent     Debridement of both ethmoid cavities performed with Mariee forceps.  Sinuses otherwise patent.

## 2017-06-30 NOTE — PROGRESS NOTES
LUNG TRANSPLANT RECIPIENT FOLLOW-UP    Reason for Visit: Follow-up after lung transplantation.     Date of Transplant: 6/12/14    Reason for Transplant: Cystic fibrosis     Type of Transplant: bilateral sequential lung    CMV Status: D+ / R-     Major Complications:   1. Vocal cord dysfunction- resolved   2. Early readmission due to MRSA pulmonary infection with persistent positive cultures   3. A1 Rejection 8/2014   4. C glabrata positive sputum-recurrent  5. Pseudomonas pneumonia in 02/2015 resolved   6. 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 CARLOS EDUARDO (grade 3)                                                                               History of Present Illness: Juanita Ibarra is a 27 y.o. year old female with a transplant history as outlined above. Since her last clinic visit she had a FESS and a hysterectomy which she tolerated well. She says that since she has been doing well. She denies shortness of breath at rest but says that she will have some on exertion. She has a sporadic cough and continues to have her chronic chest pain. She is following with pain medicine for her pain. Denies headaches. Overall, she says that she is feeling well.     Review of Systems   Constitutional: Negative for chills, fever and malaise/fatigue.        Weight gain   HENT: Negative for congestion, ear discharge, ear pain and sore throat.    Eyes: Negative for blurred vision and pain.   Respiratory: Positive for cough (sporadic). Negative for hemoptysis, sputum production, shortness of breath, wheezing and stridor.    Cardiovascular: Negative for chest pain, palpitations, orthopnea, claudication and leg swelling.   Gastrointestinal: Negative for abdominal pain, nausea and vomiting.   Genitourinary: Negative for dysuria, frequency, hematuria and urgency.   Musculoskeletal: Positive for myalgias (chronic  left sided chest pain). Negative for back pain.   Skin: Negative for itching and rash.   Neurological: Negative for tingling, tremors,  "focal weakness, seizures and headaches.   Psychiatric/Behavioral: Negative for depression and suicidal ideas. The patient is not nervous/anxious.      /88 (BP Location: Left arm, Patient Position: Sitting, BP Method: Automatic)   Pulse (!) 118   Temp 98.1 °F (36.7 °C) (Oral)   Resp 20   Ht 5' 1" (1.549 m)   Wt 55.3 kg (122 lb)   LMP 10/02/2016   SpO2 96%   BMI 23.05 kg/m²     Physical Exam   Constitutional: She is oriented to person, place, and time. No distress.   HENT:   Head: Normocephalic and atraumatic.   Eyes: Conjunctivae are normal. Pupils are equal, round, and reactive to light. No scleral icterus.   Neck: Neck supple. No JVD present. No tracheal deviation present. No thyromegaly present.   Cardiovascular: Normal rate.  Exam reveals no gallop and no friction rub.    Pulmonary/Chest: Effort normal and breath sounds normal. No stridor. No respiratory distress. She has no wheezes. She has no rales.   Abdominal: Soft. Bowel sounds are normal.   Musculoskeletal: She exhibits no edema.   Neurological: She is alert and oriented to person, place, and time. GCS score is 15.   Skin: Skin is warm. She is not diaphoretic. No erythema. No pallor.   Psychiatric: Mood and affect normal.     Labs:  cbc, cmp, tacrolimus Latest Ref Rng & Units 4/18/2017 4/26/2017 6/29/2017   TACROLIMUS LVL 5.0 - 15.0 ng/mL 9.4 - 14.6   WHITE BLOOD CELL COUNT 3.90 - 12.70 K/uL 9.08 5.93 6.00   RBC 4.00 - 5.40 M/uL 3.10(L) 3.29(L) 4.66   HEMOGLOBIN 12.0 - 16.0 g/dL 9.3(L) 9.6(L) 12.2   HEMATOCRIT 37.0 - 48.5 % 28.8(L) 31.1(L) 39.3   MCV 82 - 98 fL 93 95 84   MCH 27.0 - 31.0 pg 30.0 29.2 26.2(L)   MCHC 32.0 - 36.0 % 32.3 30.9(L) 31.0(L)   RDW 11.5 - 14.5 % 15.1(H) 15.1(H) 14.8(H)   PLATELETS 150 - 350 K/uL 126(L) 82(L) 166   MPV 9.2 - 12.9 fL 9.5 10.6 11.3   GRAN # 1.8 - 7.7 K/uL 4.7 3.8 3.4   LYMPH # 1.0 - 4.8 K/uL 3.6 1.6 1.9   MONO # 0.3 - 1.0 K/uL 0.6 0.4 0.4   EOSINOPHIL% 0.0 - 8.0 % 1.2 2.2 3.7   BASOPHIL% 0.0 - 1.9 % 0.1 " 0.2 0.2   DIFFERENTIAL METHOD - Automated Automated Automated   SODIUM 136 - 145 mmol/L 137 - 140   POTASSIUM 3.5 - 5.1 mmol/L 4.6 - 3.9   CHLORIDE 95 - 110 mmol/L 105 - 107   CO2 23 - 29 mmol/L 24 - 23   GLUCOSE 70 - 110 mg/dL 125(H) - 116(H)   BUN BLD 6 - 20 mg/dL 26(H) - 17   CREATININE 0.5 - 1.4 mg/dL 1.0 - 1.1   CALCIUM 8.7 - 10.5 mg/dL 8.5(L) - 9.3   PROTEIN TOTAL 6.0 - 8.4 g/dL - - 7.9   ALBUMIN 3.5 - 5.2 g/dL - - 4.0   BILIRUBIN TOTAL 0.1 - 1.0 mg/dL - - 0.2   ALK PHOS 55 - 135 U/L - - 157(H)   AST 10 - 40 U/L - - 19   ALT 10 - 44 U/L - - 31   ANION GAP 8 - 16 mmol/L 8 - 10   EGFR IF AFRICAN AMERICAN >60 mL/min/1.73 m:2 >60.0 - >60.0   EGFR IF NON-AFRICAN AMERICAN >60 mL/min/1.73 m:2 >60.0 - >60.0         Pulmonary Function Tests 6/29/2017 3/30/2017 3/6/2017 2/2/2017 12/29/2016 12/19/2016 9/21/2016   FVC 2.2 2.35 2.46 2.6 2.75 2.54 2.83   FEV1 0.88 0.93 0.95 1.01 1.07 1.03 1.4   TLC (liters) - - - - - - -   DLCO (ml/mmHg sec) - - - - - - -   FVC% 68 73 76 80 85 78 91   FEV1% 31 33 34 36 38 36 51   FEF 25-75 0.36 0.33 0.33 0.4 0.36 0.39 0.56   FEF 25-75% 11 10 10 12 10 11 17   TLC% - - - - - - -   RV - - - - - - -   RV% - - - - - - -   DLCO% - - - - - - -     Imaging:  Results for orders placed during the hospital encounter of 06/29/17   X-Ray Chest PA And Lateral    Narrative HISTORY:  s/p bilateral lung transplant    TECHNIQUE: PA and lateral chest radiograph     COMPARISON: Chest radiograph 03/30/17      FINDINGS:  Support devices: Right chest wall port with catheter via a subclavian approach, tip overlying the SVC, unchanged.  Sternotomy wires are unchanged in configuration.    Chest: Irregular calcification near the RA/IVC junction is unchanged.  Cardiac and mediastinal silhouettes are otherwise normal.  Lungs are well aerated and clear.  No pleural effusion or pneumothorax.    Upper Abdomen: Normal.    Other: N/A.    Impression Clear lungs.      Electronically signed by: ADAM EAGLE  Date:      06/29/17  Time:    08:50        Assessment:  1. Encounter following organ transplant    2. Bronchiolitis obliterans syndrome    3. Immunosuppression    4. Prophylactic antibiotic    5. Chronic visceral pain (pleura)      Plan:   1. Her pulmonary functions continue to be consistent with severe CARLOS EDUARDO but she has minimal symptoms and does not require oxygen. Will repeat in 2 weeks to evaluate if today's decrease is variation or progression.    2. Continue tacrolimus, mycophenolate and prednisone.     3. Continue dapsone.    4. She will continue management for her pain by her pain physician.                                                                                                                                          5.  RTC in 3 months

## 2017-07-04 DIAGNOSIS — J45.20 REACTIVE AIRWAY DISEASE, MILD INTERMITTENT, UNCOMPLICATED: ICD-10-CM

## 2017-07-04 RX ORDER — MOMETASONE FUROATE AND FORMOTEROL FUMARATE DIHYDRATE 100; 5 UG/1; UG/1
AEROSOL RESPIRATORY (INHALATION)
Qty: 13 G | Refills: 0 | Status: SHIPPED | OUTPATIENT
Start: 2017-07-04 | End: 2018-04-23 | Stop reason: SDUPTHER

## 2017-07-05 DIAGNOSIS — Z94.2 LUNG REPLACED BY TRANSPLANT: ICD-10-CM

## 2017-07-05 RX ORDER — TACROLIMUS 1 MG/1
CAPSULE ORAL
Qty: 180 CAPSULE | Refills: 11 | Status: SHIPPED | OUTPATIENT
Start: 2017-07-05 | End: 2017-09-25 | Stop reason: SDUPTHER

## 2017-07-11 ENCOUNTER — PATIENT MESSAGE (OUTPATIENT)
Dept: TRANSPLANT | Facility: CLINIC | Age: 27
End: 2017-07-11

## 2017-07-11 ENCOUNTER — TELEPHONE (OUTPATIENT)
Dept: TRANSPLANT | Facility: CLINIC | Age: 27
End: 2017-07-11

## 2017-07-11 NOTE — TELEPHONE ENCOUNTER
----- Message from Val Jung sent at 7/11/2017  9:16 AM CDT -----  Contact: Gwendolyn/Dr Dennison  Please refax records Dr Dennison has moved to fax # [phone]   Phone @ # [phone]

## 2017-07-11 NOTE — ADDENDUM NOTE
Encounter addended by: Carlotta Hodges on: 7/11/2017  8:38 AM<BR>    Actions taken: Letter status changed

## 2017-07-11 NOTE — TELEPHONE ENCOUNTER
"Received telephone message from the patient requestin that her "records" be faxed to Dr. Dennison, her local provider, because the provider moved.  My [facility] message sent to clarify which records are to be forwarded to Dr. Dennison.   "

## 2017-07-13 ENCOUNTER — PATIENT MESSAGE (OUTPATIENT)
Dept: TRANSPLANT | Facility: CLINIC | Age: 27
End: 2017-07-13

## 2017-07-13 RX ORDER — TIZANIDINE 4 MG/1
TABLET ORAL
Qty: 90 TABLET | Refills: 0 | Status: SHIPPED | OUTPATIENT
Start: 2017-07-13 | End: 2017-08-04 | Stop reason: SDUPTHER

## 2017-07-20 ENCOUNTER — TELEPHONE (OUTPATIENT)
Dept: TRANSPLANT | Facility: CLINIC | Age: 27
End: 2017-07-20

## 2017-07-20 NOTE — TELEPHONE ENCOUNTER
Called patient to follow up re: repeat PFTs requested by Dr. Alvarez.  PFTs initially scheduled for last week but patient was unable to complete PFTs as requested.  When asked, patient stated she has an appointment at [facility] in [place] for PFTs tomorrow afternoon.    Patient requested that I mail her a copy of the letter Dr. Alvarez authorized for her pain management provider. Informed patient I will put the letter in the mail to her today.  The patient denied having any needs or questions at this time.

## 2017-07-25 ENCOUNTER — DOCUMENTATION ONLY (OUTPATIENT)
Dept: TRANSPLANT | Facility: CLINIC | Age: 27
End: 2017-07-25

## 2017-08-02 DIAGNOSIS — R11.0 NAUSEA: ICD-10-CM

## 2017-08-02 RX ORDER — ONDANSETRON HYDROCHLORIDE 8 MG/1
TABLET, FILM COATED ORAL
Qty: 30 TABLET | Refills: 0 | Status: SHIPPED | OUTPATIENT
Start: 2017-08-02 | End: 2017-08-27 | Stop reason: SDUPTHER

## 2017-08-02 RX ORDER — PROMETHAZINE HYDROCHLORIDE 25 MG/1
TABLET ORAL
Qty: 45 TABLET | Refills: 0 | Status: SHIPPED | OUTPATIENT
Start: 2017-08-02 | End: 2017-08-27 | Stop reason: SDUPTHER

## 2017-08-04 RX ORDER — TIZANIDINE 4 MG/1
TABLET ORAL
Qty: 90 TABLET | Refills: 0 | Status: SHIPPED | OUTPATIENT
Start: 2017-08-04 | End: 2017-08-27 | Stop reason: SDUPTHER

## 2017-08-17 DIAGNOSIS — J45.20 REACTIVE AIRWAY DISEASE, MILD INTERMITTENT, UNCOMPLICATED: ICD-10-CM

## 2017-08-17 DIAGNOSIS — R06.2 WHEEZING: ICD-10-CM

## 2017-08-17 RX ORDER — LEVALBUTEROL TARTRATE 45 UG/1
AEROSOL, METERED ORAL
Qty: 15 G | Refills: 0 | Status: SHIPPED | OUTPATIENT
Start: 2017-08-17 | End: 2017-09-25 | Stop reason: SDUPTHER

## 2017-08-17 RX ORDER — MOMETASONE FUROATE AND FORMOTEROL FUMARATE DIHYDRATE 100; 5 UG/1; UG/1
AEROSOL RESPIRATORY (INHALATION)
Qty: 13 G | Refills: 0 | Status: SHIPPED | OUTPATIENT
Start: 2017-08-17 | End: 2017-09-25 | Stop reason: SDUPTHER

## 2017-08-27 DIAGNOSIS — R11.0 NAUSEA: ICD-10-CM

## 2017-08-27 RX ORDER — ONDANSETRON HYDROCHLORIDE 8 MG/1
TABLET, FILM COATED ORAL
Qty: 30 TABLET | Refills: 0 | Status: SHIPPED | OUTPATIENT
Start: 2017-08-27 | End: 2017-09-22 | Stop reason: SDUPTHER

## 2017-08-28 RX ORDER — TIZANIDINE 4 MG/1
TABLET ORAL
Qty: 90 TABLET | Refills: 0 | Status: SHIPPED | OUTPATIENT
Start: 2017-08-28 | End: 2017-09-27 | Stop reason: SDUPTHER

## 2017-08-28 RX ORDER — PROMETHAZINE HYDROCHLORIDE 25 MG/1
TABLET ORAL
Qty: 45 TABLET | Refills: 0 | Status: SHIPPED | OUTPATIENT
Start: 2017-08-28 | End: 2017-09-25 | Stop reason: SDUPTHER

## 2017-09-13 DIAGNOSIS — E83.42 HYPOMAGNESEMIA: ICD-10-CM

## 2017-09-13 DIAGNOSIS — E53.8 FOLATE DEFICIENCY: ICD-10-CM

## 2017-09-13 DIAGNOSIS — E84.19 CYSTIC FIBROSIS WITH GASTROINTESTINAL MANIFESTATIONS: ICD-10-CM

## 2017-09-13 RX ORDER — BUTALBITAL, ACETAMINOPHEN AND CAFFEINE 50; 325; 40 MG/1; MG/1; MG/1
TABLET ORAL
Qty: 120 TABLET | Refills: 0 | Status: SHIPPED | OUTPATIENT
Start: 2017-09-13 | End: 2018-01-01 | Stop reason: HOSPADM

## 2017-09-13 RX ORDER — PANCRELIPASE 24000; 76000; 120000 [USP'U]/1; [USP'U]/1; [USP'U]/1
CAPSULE, DELAYED RELEASE PELLETS ORAL
Qty: 810 CAPSULE | Refills: 0 | Status: SHIPPED | OUTPATIENT
Start: 2017-09-13 | End: 2017-12-11 | Stop reason: SDUPTHER

## 2017-09-14 DIAGNOSIS — Z94.2 LUNG REPLACED BY TRANSPLANT: ICD-10-CM

## 2017-09-14 RX ORDER — LANOLIN ALCOHOL/MO/W.PET/CERES
400 CREAM (GRAM) TOPICAL 2 TIMES DAILY
Qty: 60 TABLET | Refills: 11 | Status: SHIPPED | OUTPATIENT
Start: 2017-09-14 | End: 2018-04-30 | Stop reason: SDUPTHER

## 2017-09-14 RX ORDER — FOLIC ACID 1 MG/1
1000 TABLET ORAL DAILY
Qty: 30 TABLET | Refills: 11 | Status: SHIPPED | OUTPATIENT
Start: 2017-09-14 | End: 2018-07-28 | Stop reason: SDUPTHER

## 2017-09-14 NOTE — TELEPHONE ENCOUNTER
Received rx request from patient to refill magnesium oxide 400 mg take 1 tablet po BID and folic acid 1 mg take 1 tablet PO once daily.  Rx entered in epic ready for Dr. Alvarez to review and approve.

## 2017-09-15 DIAGNOSIS — R06.2 WHEEZING: ICD-10-CM

## 2017-09-15 RX ORDER — LEVALBUTEROL TARTRATE 45 UG/1
AEROSOL, METERED ORAL
Qty: 15 G | Refills: 0 | Status: SHIPPED | OUTPATIENT
Start: 2017-09-15 | End: 2017-09-25 | Stop reason: SDUPTHER

## 2017-09-15 RX ORDER — TACROLIMUS 0.5 MG/1
0.5 CAPSULE ORAL EVERY 12 HOURS
Qty: 60 CAPSULE | Refills: 11 | Status: SHIPPED | OUTPATIENT
Start: 2017-09-15 | End: 2018-04-18 | Stop reason: SDUPTHER

## 2017-09-20 DIAGNOSIS — E56.1 VITAMIN K DEFICIENCY: ICD-10-CM

## 2017-09-20 DIAGNOSIS — E55.9 VITAMIN D DEFICIENCY: ICD-10-CM

## 2017-09-20 DIAGNOSIS — E50.9 VITAMIN A DEFICIENCY: Primary | ICD-10-CM

## 2017-09-20 DIAGNOSIS — E56.0 VITAMIN E DEFICIENCY: ICD-10-CM

## 2017-09-22 DIAGNOSIS — R11.0 NAUSEA: ICD-10-CM

## 2017-09-22 RX ORDER — ONDANSETRON HYDROCHLORIDE 8 MG/1
TABLET, FILM COATED ORAL
Qty: 30 TABLET | Refills: 0 | Status: SHIPPED | OUTPATIENT
Start: 2017-09-22 | End: 2017-11-17 | Stop reason: SDUPTHER

## 2017-09-25 ENCOUNTER — HOSPITAL ENCOUNTER (OUTPATIENT)
Dept: PULMONOLOGY | Facility: CLINIC | Age: 27
Discharge: HOME | End: 2017-09-25
Payer: MEDICAID

## 2017-09-25 ENCOUNTER — HOSPITAL ENCOUNTER (OUTPATIENT)
Dept: RADIOLOGY | Facility: HOSPITAL | Age: 27
Discharge: HOME | End: 2017-09-25
Attending: INTERNAL MEDICINE
Payer: MEDICAID

## 2017-09-25 ENCOUNTER — OFFICE VISIT (OUTPATIENT)
Dept: TRANSPLANT | Facility: CLINIC | Age: 27
End: 2017-09-25
Payer: MEDICAID

## 2017-09-25 VITALS
OXYGEN SATURATION: 94 % | HEART RATE: 103 BPM | TEMPERATURE: 97 F | DIASTOLIC BLOOD PRESSURE: 82 MMHG | RESPIRATION RATE: 20 BRPM | SYSTOLIC BLOOD PRESSURE: 116 MMHG | WEIGHT: 132 LBS | BODY MASS INDEX: 24.92 KG/M2 | HEIGHT: 61 IN

## 2017-09-25 DIAGNOSIS — T17.908A ASPIRATION INTO AIRWAY, INITIAL ENCOUNTER: ICD-10-CM

## 2017-09-25 DIAGNOSIS — D84.9 IMMUNOSUPPRESSION: ICD-10-CM

## 2017-09-25 DIAGNOSIS — J44.81 BRONCHIOLITIS OBLITERANS SYNDROME: ICD-10-CM

## 2017-09-25 DIAGNOSIS — Z79.2 PROPHYLACTIC ANTIBIOTIC: ICD-10-CM

## 2017-09-25 DIAGNOSIS — Z94.2 LUNG REPLACED BY TRANSPLANT: ICD-10-CM

## 2017-09-25 DIAGNOSIS — Z48.298 ENCOUNTER FOLLOWING ORGAN TRANSPLANT: Primary | ICD-10-CM

## 2017-09-25 LAB
PRE FEV1 FVC: 38
PRE FEV1: 0.88
PRE FVC: 2.3
PREDICTED FEV1 FVC: 88
PREDICTED FEV1: 2.82
PREDICTED FVC: 3.24

## 2017-09-25 PROCEDURE — 94010 BREATHING CAPACITY TEST: CPT | Mod: PBBFAC | Performed by: INTERNAL MEDICINE

## 2017-09-25 PROCEDURE — 3008F BODY MASS INDEX DOCD: CPT | Mod: ,,, | Performed by: INTERNAL MEDICINE

## 2017-09-25 PROCEDURE — 71020 XR CHEST PA AND LATERAL: CPT | Mod: TC

## 2017-09-25 PROCEDURE — 94010 BREATHING CAPACITY TEST: CPT | Mod: 26,S$PBB,, | Performed by: INTERNAL MEDICINE

## 2017-09-25 PROCEDURE — 99215 OFFICE O/P EST HI 40 MIN: CPT | Mod: PBBFAC,25 | Performed by: INTERNAL MEDICINE

## 2017-09-25 PROCEDURE — 71020 XR CHEST PA AND LATERAL: CPT | Mod: 26,,, | Performed by: RADIOLOGY

## 2017-09-25 PROCEDURE — 99999 PR PBB SHADOW E&M-EST. PATIENT-LVL V: CPT | Mod: PBBFAC,,, | Performed by: INTERNAL MEDICINE

## 2017-09-25 PROCEDURE — 99214 OFFICE O/P EST MOD 30 MIN: CPT | Mod: 25,S$PBB,, | Performed by: INTERNAL MEDICINE

## 2017-09-25 RX ORDER — MORPHINE SULFATE 30 MG/1
30 TABLET, FILM COATED, EXTENDED RELEASE ORAL 2 TIMES DAILY
COMMUNITY
End: 2017-11-13 | Stop reason: DRUGHIGH

## 2017-09-25 RX ORDER — OXYCODONE HYDROCHLORIDE 5 MG/1
10 TABLET ORAL EVERY 4 HOURS PRN
COMMUNITY
End: 2019-01-01 | Stop reason: SDUPTHER

## 2017-09-25 RX ORDER — AMLODIPINE BESYLATE 5 MG/1
10 TABLET ORAL DAILY
Status: ON HOLD | COMMUNITY
End: 2018-01-01 | Stop reason: SDUPTHER

## 2017-09-25 NOTE — PROGRESS NOTES
LUNG TRANSPLANT RECIPIENT FOLLOW-UP    Reason for Visit: Follow-up after lung transplantation.     Date of Transplant: 6/12/14    Reason for Transplant: Cystic fibrosis     Type of Transplant: bilateral sequential lung    CMV Status: D+ / R-     Major Complications:   1. Vocal cord dysfunction- resolved   2. Early readmission due to MRSA pulmonary infection with persistent positive cultures   3. A1 Rejection 8/2014   4. C glabrata positive sputum-recurrent  5. Pseudomonas pneumonia in 02/2015 resolved   6. 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 CARLOS EDUARDO (grade 3)                                                                               History of Present Illness: Juanita Ibarra is a 27 y.o. year old female with a transplant history as outlined above. Since her last clinic visit she received a course of antibiotics for a sinus infection which were prescribed by her PCP. She received cefepime + linezolid and says that she is doing well now. Her PCP is also managing her pain now and has her on a regimen of MS Contin, oxycodone, and lorazepam. She says that this regimen works very well for her and it is also helping her breathe better.     She also feels that she feels she is aspirating when she drinks fluids.     Review of Systems   Constitutional: Negative for chills, fever and malaise/fatigue.        Weight gain   HENT: Negative for congestion, ear discharge, ear pain and sore throat.    Eyes: Negative for blurred vision and pain.   Respiratory: Negative for cough, hemoptysis, sputum production, shortness of breath, wheezing and stridor.    Cardiovascular: Negative for chest pain, palpitations, orthopnea, claudication and leg swelling.   Gastrointestinal: Negative for abdominal pain, nausea and vomiting.   Genitourinary: Negative for dysuria, frequency, hematuria and urgency.   Musculoskeletal: Positive for myalgias (chronic  left sided chest pain). Negative for back pain.   Skin: Negative for itching and rash.  "  Neurological: Negative for tingling, tremors, focal weakness, seizures and headaches.   Psychiatric/Behavioral: Negative for depression and suicidal ideas. The patient is not nervous/anxious.      /82 (BP Location: Left arm, Patient Position: Sitting, BP Method: Medium (Automatic))   Pulse 103   Temp 97.1 °F (36.2 °C) (Oral)   Resp 20   Ht 5' 1" (1.549 m)   Wt 59.9 kg (132 lb)   LMP 10/02/2016   SpO2 (!) 94%   BMI 24.94 kg/m²     Physical Exam   Constitutional: She is oriented to person, place, and time. No distress.   HENT:   Head: Normocephalic and atraumatic.   Eyes: Conjunctivae are normal. Pupils are equal, round, and reactive to light. No scleral icterus.   Neck: Neck supple. No JVD present. No tracheal deviation present. No thyromegaly present.   Cardiovascular: Normal rate.  Exam reveals no gallop and no friction rub.    Pulmonary/Chest: Effort normal and breath sounds normal. No stridor. No respiratory distress. She has no wheezes. She has no rales.   Abdominal: Soft. Bowel sounds are normal.   Musculoskeletal: She exhibits no edema.   Neurological: She is alert and oriented to person, place, and time. GCS score is 15.   Skin: Skin is warm. She is not diaphoretic. No erythema. No pallor.   Psychiatric: Mood and affect normal.     Labs:  cbc, cmp, tacrolimus Latest Ref Rng & Units 4/26/2017 6/29/2017 9/25/2017   TACROLIMUS LVL 5.0 - 15.0 ng/mL - 14.6 9.1   WHITE BLOOD CELL COUNT 3.90 - 12.70 K/uL 5.93 6.00 6.76   RBC 4.00 - 5.40 M/uL 3.29(L) 4.66 4.62   HEMOGLOBIN 12.0 - 16.0 g/dL 9.6(L) 12.2 11.3(L)   HEMATOCRIT 37.0 - 48.5 % 31.1(L) 39.3 37.6   MCV 82 - 98 fL 95 84 81(L)   MCH 27.0 - 31.0 pg 29.2 26.2(L) 24.5(L)   MCHC 32.0 - 36.0 g/dL 30.9(L) 31.0(L) 30.1(L)   RDW 11.5 - 14.5 % 15.1(H) 14.8(H) 16.8(H)   PLATELETS 150 - 350 K/uL 82(L) 166 173   MPV 9.2 - 12.9 fL 10.6 11.3 10.5   GRAN # 1.8 - 7.7 K/uL 3.8 3.4 3.2   LYMPH # 1.0 - 4.8 K/uL 1.6 1.9 3.0   MONO # 0.3 - 1.0 K/uL 0.4 0.4 0.4 "   EOSINOPHIL% 0.0 - 8.0 % 2.2 3.7 2.8   BASOPHIL% 0.0 - 1.9 % 0.2 0.2 0.3   DIFFERENTIAL METHOD - Automated Automated Automated   SODIUM 136 - 145 mmol/L - 140 142   POTASSIUM 3.5 - 5.1 mmol/L - 3.9 4.0   CHLORIDE 95 - 110 mmol/L - 107 109   CO2 23 - 29 mmol/L - 23 24   GLUCOSE 70 - 110 mg/dL - 116(H) 119(H)   BUN BLD 6 - 20 mg/dL - 17 17   CREATININE 0.5 - 1.4 mg/dL - 1.1 1.0   CALCIUM 8.7 - 10.5 mg/dL - 9.3 9.5   PROTEIN TOTAL 6.0 - 8.4 g/dL - 7.9 7.5   ALBUMIN 3.5 - 5.2 g/dL - 4.0 3.6   BILIRUBIN TOTAL 0.1 - 1.0 mg/dL - 0.2 0.3   ALK PHOS 55 - 135 U/L - 157(H) 293(H)   AST 10 - 40 U/L - 19 27   ALT 10 - 44 U/L - 31 56(H)   ANION GAP 8 - 16 mmol/L - 10 9   EGFR IF AFRICAN AMERICAN >60 mL/min/1.73 m:2 - >60.0 >60.0   EGFR IF NON-AFRICAN AMERICAN >60 mL/min/1.73 m:2 - >60.0 >60.0         Pulmonary Function Tests 9/25/2017 7/25/2017 6/29/2017 3/30/2017 3/6/2017 2/2/2017 12/29/2016   FVC 2.3 2.36 2.2 2.35 2.46 2.6 2.75   FEV1 0.88 0.94 0.88 0.93 0.95 1.01 1.07   TLC (liters) - - - - - - -   DLCO (ml/mmHg sec) - - - - - - -   FVC% 71 73 68 73 76 80 85   FEV1% 31 33 31 33 34 36 38   FEF 25-75 0.35 0.36 0.36 0.33 0.33 0.4 0.36   FEF 25-75% 10 11 11 10 10 12 10   TLC% - - - - - - -   RV - - - - - - -   RV% - - - - - - -   DLCO% - - - - - - -     Imaging:  Results for orders placed during the hospital encounter of 09/25/17   X-Ray Chest PA And Lateral    Narrative Time of Procedure: 09/25/17 08:20:15  Accession # [id number]    Comparison:   Postoperative chest radiographs: 6/29/2017, 0847 hrs.  3/30/2017.    Number of views: 2.    Findings:  Port in the RIGHT anterior chest wall is attached to a catheter which extends to the superior vena cava.  Sternal sutures are intact and aligned.    Lung volumes are normal and symmetric.  Mediastinal structures are midline.    Calcific opacity again noted in the inferior aspect of the RIGHT mediastinum, discussed in the report of a study performed 3/30/2017.    I detect no acute  pulmonary disease, pleural disease, lymph node larger, cardiac decompensation, pneumothorax, pneumomediastinum, pneumoperitoneum or significant osseous abnormality.    Impression Stable radiographic appearance of the chest following bilateral sequential lung transplant.  No convincing evidence of pulmonary disease on current or recent chest radiographs.      Electronically signed by: Adela Davalos MD  Date:     09/25/17  Time:    08:40          Assessment:  1. Encounter following organ transplant    2. Bronchiolitis obliterans syndrome    3. Aspiration into airway, initial encounter    4. Immunosuppression    5. Prophylactic antibiotic      Plan:   1. Her pulmonary functions continue to be consistent with severe CARLOS EDUARDO but she has minimal symptoms and does not require oxygen.     2. Continue tacrolimus, mycophenolate and prednisone.     3. Continue dapsone.    4. She will continue management for her pain by her PCP.                                                                                                                                        5. I will refer her to speech pathology for further evaluation of her symptoms. I did explain to her that her pain regimen could be causing laryngeal muscle relaxation and contribute to the problem.     6.  RTC in 3 months

## 2017-09-26 ENCOUNTER — PATIENT MESSAGE (OUTPATIENT)
Dept: TRANSPLANT | Facility: CLINIC | Age: 27
End: 2017-09-26

## 2017-09-27 RX ORDER — TIZANIDINE 4 MG/1
TABLET ORAL
Qty: 90 TABLET | Refills: 0 | Status: SHIPPED | OUTPATIENT
Start: 2017-09-27 | End: 2017-10-25 | Stop reason: SDUPTHER

## 2017-10-09 DIAGNOSIS — Z94.2 LUNG REPLACED BY TRANSPLANT: ICD-10-CM

## 2017-10-09 RX ORDER — MYCOPHENOLATE MOFETIL 250 MG/1
500 CAPSULE ORAL 2 TIMES DAILY
Qty: 120 CAPSULE | Refills: 11 | Status: SHIPPED | OUTPATIENT
Start: 2017-10-09 | End: 2018-04-30 | Stop reason: SDUPTHER

## 2017-10-16 ENCOUNTER — PATIENT MESSAGE (OUTPATIENT)
Dept: TRANSPLANT | Facility: CLINIC | Age: 27
End: 2017-10-16

## 2017-10-20 DIAGNOSIS — Z91.89 AT HIGH RISK FOR ASPIRATION: Primary | ICD-10-CM

## 2017-10-20 NOTE — PROGRESS NOTES
Dr. Alvarez asked that patient be scheduled for a speech evaluation due to high risk of aspiration.  Orders for SLP video evaluation and MBSS entered at request of speech therapist who will perform the patient assessment with approval from Dr. Alvarez.

## 2017-10-25 RX ORDER — TIZANIDINE 4 MG/1
TABLET ORAL
Qty: 90 TABLET | Refills: 0 | Status: SHIPPED | OUTPATIENT
Start: 2017-10-25 | End: 2017-12-11 | Stop reason: SDUPTHER

## 2017-11-06 ENCOUNTER — PATIENT MESSAGE (OUTPATIENT)
Dept: TRANSPLANT | Facility: CLINIC | Age: 27
End: 2017-11-06

## 2017-11-06 ENCOUNTER — TELEPHONE (OUTPATIENT)
Dept: RADIOLOGY | Facility: HOSPITAL | Age: 27
End: 2017-11-06

## 2017-11-06 DIAGNOSIS — Z94.2 LUNG REPLACED BY TRANSPLANT: Primary | ICD-10-CM

## 2017-11-07 ENCOUNTER — HOSPITAL ENCOUNTER (OUTPATIENT)
Dept: RADIOLOGY | Facility: HOSPITAL | Age: 27
Discharge: HOME | End: 2017-11-07
Attending: INTERNAL MEDICINE
Payer: MEDICAID

## 2017-11-07 ENCOUNTER — CLINICAL SUPPORT (OUTPATIENT)
Dept: SPEECH THERAPY | Facility: HOSPITAL | Age: 27
End: 2017-11-07
Attending: INTERNAL MEDICINE
Payer: MEDICAID

## 2017-11-07 ENCOUNTER — TELEPHONE (OUTPATIENT)
Dept: TRANSPLANT | Facility: CLINIC | Age: 27
End: 2017-11-07

## 2017-11-07 ENCOUNTER — TELEPHONE (OUTPATIENT)
Dept: OTOLARYNGOLOGY | Facility: CLINIC | Age: 27
End: 2017-11-07

## 2017-11-07 DIAGNOSIS — Z94.2 LUNG REPLACED BY TRANSPLANT: Primary | ICD-10-CM

## 2017-11-07 DIAGNOSIS — Z91.89 AT HIGH RISK FOR ASPIRATION: ICD-10-CM

## 2017-11-07 PROCEDURE — 92611 MOTION FLUOROSCOPY/SWALLOW: CPT | Mod: GN | Performed by: SPEECH-LANGUAGE PATHOLOGIST

## 2017-11-07 PROCEDURE — 74230 X-RAY XM SWLNG FUNCJ C+: CPT | Mod: TC

## 2017-11-07 PROCEDURE — G8996 SWALLOW CURRENT STATUS: HCPCS | Mod: GN,CH | Performed by: SPEECH-LANGUAGE PATHOLOGIST

## 2017-11-07 PROCEDURE — G8997 SWALLOW GOAL STATUS: HCPCS | Mod: GN,CH | Performed by: SPEECH-LANGUAGE PATHOLOGIST

## 2017-11-07 PROCEDURE — 74230 X-RAY XM SWLNG FUNCJ C+: CPT | Mod: 26,,, | Performed by: RADIOLOGY

## 2017-11-07 PROCEDURE — G8998 SWALLOW D/C STATUS: HCPCS | Mod: GN,CH | Performed by: SPEECH-LANGUAGE PATHOLOGIST

## 2017-11-07 NOTE — TELEPHONE ENCOUNTER
----- Message from Parth Wolff sent at 11/7/2017  3:23 PM CST -----  Contact: [phone]  Pt requesting to speak to staff regarding being seen tomorrow morning before leaving [facility], please call [phone]

## 2017-11-07 NOTE — TELEPHONE ENCOUNTER
"Returned patients call regarding barium swallow study.  Patient stated that her test was negative for any concerns.  Patient however c/o sinus congestion with "greenish" drainage.  Denies any other symptoms.  Informed pt that we will perform a NP swab on her tomorrow morning since she is still here in the area until late tomorrow morning.      Patient continues to c/o intermittent aspiration.  Recommended that pt follow up with Dr. Null's office.  Patient stated that she tried to contact his office but is waiting on a return call.  She asked that coordinator send Dr. Null's office a message to have them call her to schedule an appt.  Message sent to Dr. Null's office requesting an appt.  Patient verbalized understanding of all of the above information discussed.   "

## 2017-11-07 NOTE — PROGRESS NOTES
"Referring provider: Dr. Jake Alvarez  Reason for visit:  Modified barium swallow study (CPT 00356)    Report Summary   --Date: 11/07/2017  --Purpose: to evaluate anatomy and physiology of the oropharyngeal swallow, to determine effectiveness of rehabilitation strategies, and to determine diet consistency and intervention recommendations.   --Diet recommendations: regular as tolerated    Subjective / History    Juanita Ibarra is a 27 y.o. female referred by Dr. Alvarez for Modified Barium Swallow Study (28669).  She is currently on a regular diet.  She reports that she has been "aspirating when she drinks" for a few weeks/months.  She reports she can sometimes feel liquids going down the wrong pipe.  It most often happens when she is "not paying attention" to her safe swallow strategies (chin tuck, slow rate), but she reports it can also happen even when she is being careful.  She specifically notes that sometimes when she drinks a Coke, she coughs a feels a burning in her lungs.  She also notes coughing with solid food sometimes.  She has a history of lung transplant (2014) and thyroplasty for unilateral vocal fold paralysis (8/2/2016 - Left medialization laryngoplasty, adduction arytenopexy, cricothyroid subluxation).  Per Dr. Alvarez's last note in September, "her pulmonary functions continue to be consistent with severe CARLOS EDUARDO but she has minimal symptoms and does not require oxygen."    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     Current Outpatient Prescriptions on File Prior to Visit   Medication Sig Dispense " Refill    aluminum-magnesium hydroxide-simethicone (MAALOX ADVANCED) 200-200-20 mg/5 mL Susp Take 30 mLs by mouth before meals and at bedtime as needed. 148 mL 2    amlodipine (NORVASC) 5 MG tablet Take 5 mg by mouth once daily.      aripiprazole (ABILIFY) 2 MG Tab Take 2 mg by mouth once daily.      azithromycin (ZITHROMAX) 500 MG tablet Take 1 tablet (500 mg total) by mouth every Mon, Wed, Fri. 12 tablet 11    butalbital-acetaminophen-caffeine -40 mg (FIORICET, ESGIC) -40 mg per tablet TAKE 1 TABLET BY MOUTH EVERY 6 HOURS AS NEEDED FOR HEADACHE 120 tablet 0    calcium-vitamin D3 500 mg(1,250mg) -200 unit per tablet Take 1 tablet by mouth 2 (two) times daily with meals. 60 tablet 11    CREON 24,000-76,000 -120,000 unit capsule TAKE 5 CAPSULES BY MOUTH WITH MEALS AND 4 CAPSULES WITH SNACKS EVERYDAY (Patient taking differently: TAKE 6 CAPSULES BY MOUTH WITH MEALS AND 5 CAPSULES WITH SNACKS EVERYDAY) 810 capsule 0    dapsone 100 MG Tab TAKE 1 TABLET BY MOUTH EVERY DAY 30 tablet 11    diphenhydrAMINE (BENADRYL) 50 MG tablet Take 1 tablet (50 mg total) by mouth every 6 (six) hours as needed for Itching. 1 tablet 0    DULERA 100-5 mcg/actuation HFAA INHALE 1 PUFF BY MOUTH TWICE DAILY 13 g 0    duloxetine (CYMBALTA) 30 MG capsule Take 30 mg by mouth once daily.      fexofenadine (ALLEGRA) 180 MG tablet Take 180 mg by mouth once daily.      fluconazole (DIFLUCAN) 150 MG Tab TAKE 1 TABLET BY MOUTH EVERY WEEK (Patient taking differently: TAKE 1 TABLET BY MOUTH EVERY WEEK PRN VAGINAL YEAST S/S) 30 tablet 0    folic acid (FOLVITE) 1 MG tablet Take 1 tablet (1,000 mcg total) by mouth once daily. 30 tablet 11    gabapentin (NEURONTIN) 300 MG capsule Take 1 capsule (300 mg total) by mouth 3 (three) times daily. (Patient taking differently: Take 1,100 mg by mouth 3 (three) times daily. ) 90 capsule 11    insulin glargine, TOUJEO, (TOUJEO) 300 unit/mL (1.5 mL) InPn pen Inject 3 Units into the skin once  daily.       levalbuterol (XOPENEX HFA) 45 mcg/actuation inhaler INHALE 1 TO 2 PUFFS INTO THE LUNGS EVERY 6 HOURS AS NEEDED FOR WHEEZING OR SHORTNESS OF BREATH. USE WITH SPACER. 15 g 0    lorazepam (ATIVAN) 2 MG Tab Take 1 tablet (2 mg total) by mouth every 12 (twelve) hours as needed (for anxiety). 60 tablet 3    magnesium oxide (MAG-OX) 400 mg tablet Take 1 tablet (400 mg total) by mouth 2 (two) times daily. 60 tablet 11    montelukast (SINGULAIR) 10 mg tablet TAKE 1 TABLET BY MOUTH EVERY DAY 30 tablet 11    morphine (MS CONTIN) 30 MG 12 hr tablet Take 30 mg by mouth 2 (two) times daily.      mv. min cmb#[id number] (AQUADEKS) 100-700-10 mcg-mcg-mg Cap cap Take 1 capsule by mouth 2 (two) times daily. 60 capsule 11    mycophenolate (CELLCEPT) 250 mg Cap Take 2 capsules (500 mg total) by mouth 2 (two) times daily. 120 capsule 11    omeprazole (PRILOSEC) 40 MG capsule TAKE 1 CAPSULE BY MOUTH EVERY MORNING 30 capsule 11    ondansetron (ZOFRAN) 8 MG tablet Take 1 tablet (8 mg total) by mouth every 8 (eight) hours as needed. 30 tablet 1    oxycodone (ROXICODONE) 5 MG immediate release tablet Take 10 mg by mouth every 4 (four) hours as needed for Pain.      polyethylene glycol (GLYCOLAX) 17 gram PwPk Take 17 g by mouth 2 (two) times daily. 60 packet 11    predniSONE (DELTASONE) 5 MG tablet Take 5 mg by mouth once daily.      PROCHAMBER USE AS DIRECTED 1 Device 0    promethazine (PHENERGAN) 25 MG tablet TAKE 1 TABLET BY MOUTH EVERY 8 HOURS AS NEEDED FOR NAUSEA 45 tablet 0    tacrolimus (PROGRAF) 0.5 MG Cap Take 1 capsule (0.5 mg total) by mouth every 12 (twelve) hours. 60 capsule 11    tacrolimus (PROGRAF) 1 MG Cap Take 3 capsules (3 mg total) by mouth every 12 (twelve) hours. Daily doses: 3.5 mg every 12 hours. 180 capsule 11    tiZANidine (ZANAFLEX) 4 MG tablet TAKE 2 TABLETS BY MOUTH EVERY 6 HOURS AS NEEDED 90 tablet 0     No current facility-administered medications on file prior to visit.         Objective   Lateral videofluorographic views were obtained. The radiologist and speech pathologist were present for the entire procedure.     Trials administered / method of administration: thin liquid/tsp, thin liquid/cup, thin liquid/sequential cup sips, pudding/tsp, cracker and barium tablet    Oral phase  - Grossly WNL with adequate/timely lip closure, tongue control during bolus hold, bolus preparation, and bolus transport/lingual motion.  Little to no oral residue.      Pharyngeal phase  - Mildly delayed initiation: to valleculae  - Adequate soft palate elevation  - Mildly reduced laryngeal elevation, adequate anterior hyoid excursion  - Complete epiglottic movement  - Complete laryngeal vestibular closure  - Adequate pharyngeal stripping wave  - Complete PE segment opening  - Adequate tongue base retraction  - Mild pharyngeal residue: valleculae, pyriform.  Cleared with repeat swallows.     Therapeutic interventions to reduce risk for pen/asp/residue: n/a.  Pt does intermittently use chin-tuck strategy during swallow study but did not note any change or improvement when this strategy was employed.  No coughing episodes were observed, even with sequential cup sips of thin liquid.  Pt was visibly frustrated that the problem was not apparent on study today.       Rosenbek Penetration-Aspiration Scale (Rosenbek et al 1996)  Best Trial: 1. Contrast does not enter airway.   Worst Trial: 1. Contrast does not enter airway.     Assessment / Impressions   The results of this MBSS indicate that patient demonstrates oropharyngeal swallow function WNL; however, pt reports her coughing complaint was not an issue on study today and is concerned about continued aspiration when she drinks liquids.  It is possible that her coughing may be related to GERD, which may also explain the burning sensation she feels in her chest.      Recommendations / POC   -Diet: recommend regular as tolerated; recommended consideration of  thickening liquids if pt typically coughs with thin liquids, but she is unwilling to use this strategy long-term.   -Therapeutic technique: recommend effortful swallow and multiple swallows per bolus   -Consider dysphagia therapy closer to home, for 2-4 sessions over the course of 3-6 weeks, in order to increase laryngeal excursion and airway closure and increase swallow safety by implementing therapeutic maneuvers  -Contact clinician or referring provider for repeat MBSS if swallowing status changes or worsens  -R/o GERD which may cause a burning sensation in her chest    G-Codes for Swallowing  Current status:  - CH  Projected status:   - CH  Discharge status:   -  CH

## 2017-11-08 ENCOUNTER — CLINICAL SUPPORT (OUTPATIENT)
Dept: TRANSPLANT | Facility: CLINIC | Age: 27
End: 2017-11-08
Payer: MEDICAID

## 2017-11-08 DIAGNOSIS — R09.81 NASAL SINUS CONGESTION: ICD-10-CM

## 2017-11-08 DIAGNOSIS — R09.81 NASAL SINUS CONGESTION: Primary | ICD-10-CM

## 2017-11-08 PROCEDURE — 87632 RESP VIRUS 6-11 TARGETS: CPT

## 2017-11-08 NOTE — PROGRESS NOTES
Performed nasopharyngeal swab today.  Sample collected for respiratory viral panel testing for c/o stuffy and runny nose.  Patient tolerated procedure without any complaints or difficulty.  Informed patient that we will contact her when the results become available.  Patient was asked to call us if symptoms worsens. Patient verbalized understanding of all information provided today.

## 2017-11-09 DIAGNOSIS — R06.2 WHEEZING: ICD-10-CM

## 2017-11-09 LAB
ENTEROVIRUS: NOT DETECTED
HUMAN BOCAVIRUS: NOT DETECTED
HUMAN CORONAVIRUS, COMMON COLD VIRUS: NOT DETECTED
INFLUENZA A - H1N1-09: NOT DETECTED
PARAINFLUENZA: NOT DETECTED
RVP - ADENOVIRUS: NOT DETECTED
RVP - HUMAN METAPNEUMOVIRUS (HMPV): NOT DETECTED
RVP - INFLUENZA A: NOT DETECTED
RVP - INFLUENZA B: NOT DETECTED
RVP - RESPIRATORY SYNCTIAL VIRUS (RSV) A: NOT DETECTED
RVP - RESPIRATORY VIRAL PANEL, SOURCE: NORMAL
RVP - RHINOVIRUS: NOT DETECTED

## 2017-11-09 RX ORDER — LEVALBUTEROL TARTRATE 45 UG/1
AEROSOL, METERED ORAL
Qty: 15 G | Refills: 0 | Status: SHIPPED | OUTPATIENT
Start: 2017-11-09 | End: 2018-01-01 | Stop reason: SDUPTHER

## 2017-11-13 ENCOUNTER — OFFICE VISIT (OUTPATIENT)
Dept: OTOLARYNGOLOGY | Facility: CLINIC | Age: 27
End: 2017-11-13
Payer: MEDICAID

## 2017-11-13 ENCOUNTER — HOSPITAL ENCOUNTER (OUTPATIENT)
Dept: PULMONOLOGY | Facility: CLINIC | Age: 27
Discharge: HOME | End: 2017-11-13
Payer: MEDICAID

## 2017-11-13 ENCOUNTER — OFFICE VISIT (OUTPATIENT)
Dept: TRANSPLANT | Facility: CLINIC | Age: 27
End: 2017-11-13
Payer: MEDICAID

## 2017-11-13 ENCOUNTER — HOSPITAL ENCOUNTER (OUTPATIENT)
Dept: RADIOLOGY | Facility: HOSPITAL | Age: 27
Discharge: HOME | End: 2017-11-13
Attending: INTERNAL MEDICINE
Payer: MEDICAID

## 2017-11-13 VITALS
WEIGHT: 131 LBS | HEART RATE: 108 BPM | OXYGEN SATURATION: 97 % | RESPIRATION RATE: 20 BRPM | HEIGHT: 61 IN | TEMPERATURE: 97 F | DIASTOLIC BLOOD PRESSURE: 85 MMHG | BODY MASS INDEX: 24.73 KG/M2 | SYSTOLIC BLOOD PRESSURE: 152 MMHG

## 2017-11-13 VITALS
HEART RATE: 113 BPM | BODY MASS INDEX: 25.03 KG/M2 | DIASTOLIC BLOOD PRESSURE: 94 MMHG | SYSTOLIC BLOOD PRESSURE: 133 MMHG | WEIGHT: 132.5 LBS

## 2017-11-13 DIAGNOSIS — R13.10 DYSPHAGIA, UNSPECIFIED TYPE: ICD-10-CM

## 2017-11-13 DIAGNOSIS — Z94.2 LUNG REPLACED BY TRANSPLANT: ICD-10-CM

## 2017-11-13 DIAGNOSIS — R13.12 DYSPHAGIA, OROPHARYNGEAL: Primary | ICD-10-CM

## 2017-11-13 DIAGNOSIS — Z48.298 ENCOUNTER FOLLOWING ORGAN TRANSPLANT: ICD-10-CM

## 2017-11-13 DIAGNOSIS — D84.9 IMMUNOSUPPRESSION: ICD-10-CM

## 2017-11-13 DIAGNOSIS — J06.9 UPPER RESPIRATORY TRACT INFECTION, UNSPECIFIED TYPE: Primary | ICD-10-CM

## 2017-11-13 DIAGNOSIS — J38.01 UNILATERAL COMPLETE VOCAL FOLD PARALYSIS: ICD-10-CM

## 2017-11-13 DIAGNOSIS — J44.81 BRONCHIOLITIS OBLITERANS SYNDROME: ICD-10-CM

## 2017-11-13 DIAGNOSIS — J01.91 ACUTE RECURRENT SINUSITIS, UNSPECIFIED LOCATION: Primary | ICD-10-CM

## 2017-11-13 DIAGNOSIS — Z79.2 PROPHYLACTIC ANTIBIOTIC: ICD-10-CM

## 2017-11-13 LAB
PRE FEV1 FVC: 41
PRE FEV1: 0.84
PRE FVC: 2.03
PREDICTED FEV1 FVC: 88
PREDICTED FEV1: 2.82
PREDICTED FVC: 3.24

## 2017-11-13 PROCEDURE — 94010 BREATHING CAPACITY TEST: CPT | Mod: PBBFAC | Performed by: INTERNAL MEDICINE

## 2017-11-13 PROCEDURE — 31575 DIAGNOSTIC LARYNGOSCOPY: CPT | Mod: S$PBB,,, | Performed by: OTOLARYNGOLOGY

## 2017-11-13 PROCEDURE — 99213 OFFICE O/P EST LOW 20 MIN: CPT | Mod: PBBFAC,25 | Performed by: INTERNAL MEDICINE

## 2017-11-13 PROCEDURE — 94010 BREATHING CAPACITY TEST: CPT | Mod: 26,S$PBB,, | Performed by: INTERNAL MEDICINE

## 2017-11-13 PROCEDURE — 31575 DIAGNOSTIC LARYNGOSCOPY: CPT | Mod: PBBFAC | Performed by: OTOLARYNGOLOGY

## 2017-11-13 PROCEDURE — 99214 OFFICE O/P EST MOD 30 MIN: CPT | Mod: 25,S$PBB,, | Performed by: INTERNAL MEDICINE

## 2017-11-13 PROCEDURE — 99213 OFFICE O/P EST LOW 20 MIN: CPT | Mod: 25,S$PBB,, | Performed by: OTOLARYNGOLOGY

## 2017-11-13 PROCEDURE — 87581 M.PNEUMON DNA AMP PROBE: CPT

## 2017-11-13 PROCEDURE — 99999 PR PBB SHADOW E&M-EST. PATIENT-LVL III: CPT | Mod: PBBFAC,,, | Performed by: INTERNAL MEDICINE

## 2017-11-13 PROCEDURE — 99999 PR PBB SHADOW E&M-EST. PATIENT-LVL III: CPT | Mod: PBBFAC,,, | Performed by: OTOLARYNGOLOGY

## 2017-11-13 PROCEDURE — 71020 XR CHEST PA AND LATERAL: CPT | Mod: 26,,, | Performed by: RADIOLOGY

## 2017-11-13 PROCEDURE — 71020 XR CHEST PA AND LATERAL: CPT | Mod: TC

## 2017-11-13 PROCEDURE — 99213 OFFICE O/P EST LOW 20 MIN: CPT | Mod: PBBFAC,25,27 | Performed by: OTOLARYNGOLOGY

## 2017-11-13 RX ORDER — MIRTAZAPINE 15 MG/1
15 TABLET, FILM COATED ORAL NIGHTLY
COMMUNITY
End: 2018-01-15 | Stop reason: DRUGHIGH

## 2017-11-13 RX ORDER — MORPHINE SULFATE 15 MG/1
15 TABLET, FILM COATED, EXTENDED RELEASE ORAL 2 TIMES DAILY
COMMUNITY
End: 2019-01-01 | Stop reason: SDUPTHER

## 2017-11-13 NOTE — PROGRESS NOTES
[facility]  Department of Otorhinolaryngology and Communication Sciences    Subjective:      Juanita Ibarra is a 27 y.o. female who presents for follow-up. She has a history of left vocal fold paralysis following lung transplant in June 2014. She underwent laryngeal framework surgery as outlined below.     She remains her voice has maintained stability since her last visit. She is pleased with this phenomenon. However, she again complains of difficulty with swallowing. She feels that she is aspirating all the time.     She underwent updated modified barium swallow study after her thyroplasty/AA on 9/2/2016. Findings were improved, with only mild pharyngeal dysphagia and no evidence of aspiration or penetration.  Due to the patient's concern for swallowing deterioration, she underwent updated MBSS 11/7/2017. Findings were as follows:  oropharyngeal swallow function WNL. No evidence of penetration or aspiration.    Presently, she is sick with a sinus infection.    TREATMENT HISTORY:  1. 8/2/2016 - Left medialization laryngoplasty, adduction arytenopexy, cricothyroid subluxation    The patient's medications, allergies, past medical, surgical, social and family histories were reviewed and updated as appropriate.    A detailed review of systems was obtained with pertinent positives as per the above HPI, and otherwise negative.      Objective:     VA reviewed    Constitutional: comfortable, well dressed  Psychiatric: appropriate affect  Respiratory: comfortably breathing, symmetric chest rise, no stridor  Voice: mild strain; durable interval improvements; otherwise normal for age and gender  Head: normocephalic  Eyes: conjunctivae and sclerae clear  Indirect laryngoscopy is limited due to gag    Procedure  Flexible Laryngoscopy (95418): Laryngoscopy is indicated for assessment of upper aerodigestive structure and function. This was carried out transnasally with a distal chip videoendoscope. After  verbal consent was obtained, the patient was positioned and the nose was topically decongested with 1% phenylephrine and topically anesthetized with 4% lidocaine. The endoscope was passed through the most patent nasal cavity and positioned to image the nasopharynx, larynx, and hypopharynx in detail. The following featured were examined: nasopharyngeal, laryngeal, hypopharyngeal masses; velopharyngeal strength, closure, and symmetry of motion; vocal fold range and symmetry of motion; laryngeal mucosal edema, erythema, inflammation, and hydration; salivary pooling; and gross laryngeal sensation. The equipment was removed. The patient tolerated the procedure well without complication. All findings were normal except:  - left vocal fold immobile, paramedian position; durable improvement in vertical height match  - complete glottal closure  - mild persistent laryngeal hyperfunction/spasm  - no pooling  - prominent gag reflex limited ability to perform videostroboscopy      Assessment:     Juanita Ibarra is a 27 y.o. female with left vocal fold paralysis following lung transplant. She is doing well following laryngeal framework surgery.    Despite the patient's concern about ongoing aspiration, neither her laryngeal exam nor either of her recent MBS studies indicates this to be a problem.     Plan:     Reassurance was provided. Adherence to Ms. Haney's swallowing strategies was recommended. She may benefit from a re-connect with an SLP clinician for ongoing dysphagia therapy sessions. We will try to arrange this closer to home if possible.    There was no evidence of cervical esophageal dysfunction on her MBSS. She may benefit from GI evaluation to look for esophageal dysmotility/reflux/hiatal hernia or other possibilities.    She will follow up with me in the future on an as-needed basis.    All questions were answered, and the patient is in agreement with the plan.    Gary Null M.D.  [facility]  [place]  Department of Otorhinolaryngology and Communication Sciences

## 2017-11-13 NOTE — PROGRESS NOTES
LUNG TRANSPLANT RECIPIENT FOLLOW-UP    Reason for Visit: Follow-up after lung transplantation.     Date of Transplant: 6/12/14    Reason for Transplant: Cystic fibrosis     Type of Transplant: bilateral sequential lung    CMV Status: D+ / R-     Major Complications:   1. Vocal cord dysfunction- resolved   2. A1 Rejection 8/2014   3. C glabrata positive sputum-recurrent  4. Pseudomonas pneumonia in 02/2015 resolved   5. 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 CARLOS EDUARDO (grade 3)                                                                               History of Present Illness: Juanita Ibarra is a 27 y.o. year old female with a transplant history as outlined above. She presents for an urgent visit. She says that for the last two weeks she has been having increase in nasal congestion and cough. Her cough is non productive. She had a respiratory viral panel performed last week when she came to see speech therapy. The panel was unremarkable. She has not visited with Dr. Olsen but has been seeing Dr. Null for swallowing issues.    She denies fevers or shortness of breath. She does say that she feels that she is aspirating even though all of her testing has been negative. SLP recommended for her to have therapy close to home. She also says that she is feeling fatigued by the end of the day. She continues to use MS Contin and oxycodone for her chronic pain and it is being prescribed by her PCP.     Review of Systems   Constitutional: Positive for malaise/fatigue. Negative for chills, diaphoresis, fever and weight loss.        Weight gain   HENT: Positive for congestion. Negative for ear discharge, ear pain, hearing loss, nosebleeds and sore throat.    Eyes: Negative for blurred vision, double vision, photophobia and pain.   Respiratory: Positive for cough. Negative for hemoptysis, sputum production, shortness of breath, wheezing and stridor.    Cardiovascular: Negative for chest pain, palpitations, orthopnea, claudication, leg  "swelling and PND.   Gastrointestinal: Negative for abdominal pain, blood in stool, constipation, diarrhea, heartburn, melena, nausea and vomiting.   Genitourinary: Negative for dysuria, flank pain, frequency, hematuria and urgency.   Musculoskeletal: Negative for back pain, falls, joint pain, myalgias and neck pain.   Skin: Negative for itching and rash.   Neurological: Negative for dizziness, tingling, tremors, sensory change, focal weakness, seizures, loss of consciousness, weakness and headaches.   Endo/Heme/Allergies: Does not bruise/bleed easily.   Psychiatric/Behavioral: Negative for depression, hallucinations, memory loss and suicidal ideas. The patient is not nervous/anxious and does not have insomnia.      BP (!) 152/85 (BP Location: Right arm, Patient Position: Sitting, BP Method: Large (Automatic))   Pulse 108   Temp 97.1 °F (36.2 °C) (Oral)   Resp 20   Ht 5' 1" (1.549 m)   Wt 59.4 kg (131 lb)   LMP 10/02/2016   SpO2 97%   BMI 24.75 kg/m²     Physical Exam   Constitutional: She is oriented to person, place, and time and well-developed, well-nourished, and in no distress. No distress.   HENT:   Head: Normocephalic and atraumatic.   Nose: Nose normal.   Mouth/Throat: Oropharynx is clear and moist. No oropharyngeal exudate.   Nasal voice   Eyes: Conjunctivae and EOM are normal. Pupils are equal, round, and reactive to light. No scleral icterus.   Neck: Normal range of motion. Neck supple. No JVD present. No tracheal deviation present. No thyromegaly present.   Cardiovascular: Normal rate, regular rhythm and intact distal pulses.  Exam reveals no gallop and no friction rub.    No murmur heard.  Pulmonary/Chest: Effort normal and breath sounds normal. No stridor. No respiratory distress. She has no wheezes. She has no rales. She exhibits no tenderness.   Abdominal: Soft. Bowel sounds are normal. She exhibits no distension and no mass. There is no tenderness. There is no rebound and no guarding. "   Musculoskeletal: Normal range of motion. She exhibits no edema.   Lymphadenopathy:     She has no cervical adenopathy.   Neurological: She is alert and oriented to person, place, and time. No cranial nerve deficit. Gait normal. Coordination normal. GCS score is 15.   Skin: Skin is warm and dry. No rash noted. She is not diaphoretic. No erythema. No pallor.   Psychiatric: Mood and affect normal.     Labs:  cbc, cmp, tacrolimus Latest Ref Rng & Units 6/29/2017 9/25/2017 11/13/2017   TACROLIMUS LVL 5.0 - 15.0 ng/mL 14.6 9.1 -   WHITE BLOOD CELL COUNT 3.90 - 12.70 K/uL 6.00 6.76 7.98   RBC 4.00 - 5.40 M/uL 4.66 4.62 4.59   HEMOGLOBIN 12.0 - 16.0 g/dL 12.2 11.3(L) 11.6(L)   HEMATOCRIT 37.0 - 48.5 % 39.3 37.6 38.3   MCV 82 - 98 fL 84 81(L) 83   MCH 27.0 - 31.0 pg 26.2(L) 24.5(L) 25.3(L)   MCHC 32.0 - 36.0 g/dL 31.0(L) 30.1(L) 30.3(L)   RDW 11.5 - 14.5 % 14.8(H) 16.8(H) 15.5(H)   PLATELETS 150 - 350 K/uL 166 173 169   MPV 9.2 - 12.9 fL 11.3 10.5 10.9   GRAN # 1.8 - 7.7 K/uL 3.4 3.2 4.4   LYMPH # 1.0 - 4.8 K/uL 1.9 3.0 2.8   MONO # 0.3 - 1.0 K/uL 0.4 0.4 0.6   EOSINOPHIL% 0.0 - 8.0 % 3.7 2.8 2.0   BASOPHIL% 0.0 - 1.9 % 0.2 0.3 0.4   DIFFERENTIAL METHOD - Automated Automated Automated   SODIUM 136 - 145 mmol/L 140 142 139   POTASSIUM 3.5 - 5.1 mmol/L 3.9 4.0 3.8   CHLORIDE 95 - 110 mmol/L 107 109 105   CO2 23 - 29 mmol/L 23 24 23   GLUCOSE 70 - 110 mg/dL 116(H) 119(H) 106   BUN BLD 6 - 20 mg/dL 17 17 13   CREATININE 0.5 - 1.4 mg/dL 1.1 1.0 1.0   CALCIUM 8.7 - 10.5 mg/dL 9.3 9.5 8.9   PROTEIN TOTAL 6.0 - 8.4 g/dL 7.9 7.5 7.6   ALBUMIN 3.5 - 5.2 g/dL 4.0 3.6 3.6   BILIRUBIN TOTAL 0.1 - 1.0 mg/dL 0.2 0.3 0.3   ALK PHOS 55 - 135 U/L 157(H) 293(H) 158(H)   AST 10 - 40 U/L 19 27 18   ALT 10 - 44 U/L 31 56(H) 23   ANION GAP 8 - 16 mmol/L 10 9 11   EGFR IF AFRICAN AMERICAN >60 mL/min/1.73 m:2 >60.0 >60.0 >60.0   EGFR IF NON-AFRICAN AMERICAN >60 mL/min/1.73 m:2 >60.0 >60.0 >60.0         Pulmonary Function Tests 11/13/2017 9/25/2017  7/25/2017 6/29/2017 3/30/2017 3/6/2017 2/2/2017   FVC 2.03 2.3 2.36 2.2 2.35 2.46 2.6   FEV1 0.84 0.88 0.94 0.88 0.93 0.95 1.01   TLC (liters) - - - - - - -   DLCO (ml/mmHg sec) - - - - - - -   FVC% 63 71 73 68 73 76 80   FEV1% 30 31 33 31 33 34 36   FEF 25-75 0.33 0.35 0.36 0.36 0.33 0.33 0.4   FEF 25-75% 10 10 11 11 10 10 12   TLC% - - - - - - -   RV - - - - - - -   RV% - - - - - - -   DLCO% - - - - - - -     Imaging:  Results for orders placed during the hospital encounter of 11/13/17   X-Ray Chest PA And Lateral    Narrative Patient had a bilateral lung transplant.  Heart size remains normal.  Postop changes are noted.  Lungs are clear vascular catheter is in place.    Impression  Satisfactory postoperative findings      Electronically signed by: David Rojo MD  Date:     11/13/17  Time:    08:53            Assessment:  1. Acute recurrent sinusitis, unspecified location    2. Encounter following organ transplant    3. Bronchiolitis obliterans syndrome    4. Immunosuppression    5. Prophylactic antibiotic    6. Dysphagia, unspecified type      Plan:   1. Will check for full respiratory pathogen panel and guide antibiotic therapy depending on the results. I also advised her to make a follow appointment with Dr. Olsen.    2. Her pulmonary functions continue to be consistent with severe CARLOS EDUARDO but she has minimal symptoms and does not require oxygen.     3. Continue tacrolimus, mycophenolate and prednisone.     3. Continue dapsone.    4. Will refer to SLP locally for therapy as recommended.    5.  RTC in 3 months

## 2017-11-16 DIAGNOSIS — R11.0 NAUSEA: ICD-10-CM

## 2017-11-16 LAB
ENTEROVIRUS: NOT DETECTED
HUMAN BOCAVIRUS: NOT DETECTED
HUMAN CORONAVIRUS, COMMON COLD VIRUS: NOT DETECTED
INFLUENZA A - H1N1-09: NOT DETECTED
LEGIONELLA PNEUMOPHILA: NOT DETECTED
MORAXELLA CATARRHALIS: NOT DETECTED
PARAINFLUENZA: NOT DETECTED
RVP - ADENOVIRUS: NOT DETECTED
RVP - HUMAN METAPNEUMOVIRUS (HMPV): NOT DETECTED
RVP - INFLUENZA A: NOT DETECTED
RVP - INFLUENZA B: NOT DETECTED
RVP - RESPIRATORY SYNCTIAL VIRUS (RSV) A: NOT DETECTED
RVP - RESPIRATORY VIRAL PANEL, SOURCE: ABNORMAL
RVP - RHINOVIRUS: NOT DETECTED
TEM - ACINETOBACTER BAUMANNII: NOT DETECTED
TEM - BORDETELLA PERTUSSIS: NOT DETECTED
TEM - CHLAMYDOPHILA PNEUMONIAE: NOT DETECTED
TEM - KLEBSIELLA PNEUMONIAE: NOT DETECTED
TEM - MRSA: POSITIVE
TEM - MYCOPLASMA PNEUMONIAE: NOT DETECTED
TEM - NEISSERIA MENINGITIDIS: NOT DETECTED
TEM - PANTON-VALENTINE: NOT DETECTED
TEM - PSEUDOMONAS AERUGINOSA: POSITIVE
TEM - STAPHYLOCOCCUS AUREUS: NOT DETECTED
TEM - STREPTOCOCCUS PNEUMONIAE: NOT DETECTED
TEM - STREPTOCOCCUS PYOGENES A: NOT DETECTED
TEM- HAEMOPHILUS INFLUENZAE B: NOT DETECTED
TEM- HAEMOPHILUS INFLUENZAE: NOT DETECTED

## 2017-11-17 ENCOUNTER — TELEPHONE (OUTPATIENT)
Dept: TRANSPLANT | Facility: CLINIC | Age: 27
End: 2017-11-17

## 2017-11-17 DIAGNOSIS — A49.8 PSEUDOMONAS INFECTION: ICD-10-CM

## 2017-11-17 DIAGNOSIS — A49.02 MRSA INFECTION: Primary | ICD-10-CM

## 2017-11-17 DIAGNOSIS — J22 LRTI (LOWER RESPIRATORY TRACT INFECTION): ICD-10-CM

## 2017-11-17 DIAGNOSIS — J06.9 UPPER RESPIRATORY TRACT INFECTION, UNSPECIFIED TYPE: ICD-10-CM

## 2017-11-17 RX ORDER — PROMETHAZINE HYDROCHLORIDE 25 MG/1
TABLET ORAL
Qty: 45 TABLET | Refills: 0 | Status: SHIPPED | OUTPATIENT
Start: 2017-11-17 | End: 2018-01-15 | Stop reason: SDUPTHER

## 2017-11-17 RX ORDER — ONDANSETRON HYDROCHLORIDE 8 MG/1
TABLET, FILM COATED ORAL
Qty: 30 TABLET | Refills: 0 | Status: SHIPPED | OUTPATIENT
Start: 2017-11-17 | End: 2018-01-15 | Stop reason: SDUPTHER

## 2017-11-17 RX ORDER — LINEZOLID 600 MG/1
600 TABLET, FILM COATED ORAL EVERY 12 HOURS
Qty: 20 TABLET | Refills: 0 | Status: SHIPPED | OUTPATIENT
Start: 2017-11-17 | End: 2017-11-27

## 2017-11-17 RX ORDER — LEVOFLOXACIN 750 MG/1
750 TABLET ORAL DAILY
Qty: 10 TABLET | Refills: 0 | Status: SHIPPED | OUTPATIENT
Start: 2017-11-17 | End: 2017-11-27

## 2017-11-17 NOTE — TELEPHONE ENCOUNTER
"On 11/16/17, Aletha Lawson RN received written orders from Dr. Alvarez to arrange for the patient to take: oral Zyvox 600 mg every 12 hours and oral Levquin 750 mg daily x 10 days for (+) cultures: MRSA and pseudomonas (respiratory antigens panel collected per NP swab on 11/13/17).   Patient contacted by Aletha Lawson RN - post transplant coordinator and was informed by the patient that because she does not tolerate oral antibiotics, she would prefer to have IV antibiotics. Aletha Lawson RN contacted Dr. Alvarez and received written orders for IV antibiotics: IV Vancomycin and Zosyn; patient re-contacted by Aletha Lawson RN and was told by the  patient that she had already contacted her local PCP, whom she stated had agreed to admit her to a local hospital for the duration of the IV antibiotic therapy due to her intolerance of the IV antibiotics. Aletha Lawson RN contacted Dr. Alvarez and received a reply that he would first have to speak to the patient's PCP before considering coordinating inpatient antibiotic administration through another provider at another facility. Patient informed that Dr. Alvarez is unavailable to discuss until 11/20/17 and gave instructions that the the PCP should contact him on 11/20/17.   Patient informed of all - argumentative, re-stating her reasons for wanting the be admitted for the full antibiotic therapy - she can never tolerate the meds, oral anti-emetics and oral benadryl "do nothing for me, I need IV benadryl if I'm going to have Vancomycin". Patient stated that she has never received Zosyn before and is concerned about Levaquin because "I looked it up and it is closely related to Cipro which makes me sick" - when asked to explain "sick", patient stated it makes her profoundly nauseated with vomiting, "I always wind up in the ER anyway". Patient stated that she would have to think about what she is willing to do since admission is not possible before 11/20/17. Patient wanted a guarantee that she " "could wait until at least 11/20/17 to start anything - informed that there was no way to guarantee her s/s wouldn't worsen between now and then and that starting the antibiotics as soon as possible was the best option. Stated that she would call back today (11/17/19) to let us know what her decision is.     Patient called back today and wanted to negotiate a combination of oral and IV antibiotics - oral Zyvox and IV Zosyn. Explained that this would have to be approved by Dr. Alvarez and DARCI Rain, pharm D, but because she stated she has never received Zosyn, would have to receive the first dose in an infusion or hospital facility which is unlikely to occur today, Friday afternoon, due to the inherent delay for insurance approval once the orders have been sent.   Explained that the only way for rx'd antibiotics to be started before next week would be with the oral form which was Dr. Alvarez's preference for treatment. Patient made aware that Zyvox may require a PA which could delay her receiving that medication anyway. Patient stated " I hate being put in this position".  Pointed out that the dilemma arises from her very specific, personal requirement of inpatient admission for IV antibiotics which is not usual practice, and because she has specified it be at her local hospital through a non-[facility] provider which can not be considered before 11/20/17 when Dr. Alvarez and her PCP are able to speak to one another. Patient has had many courses of IV antibiotics at home in the past, desire for inpatient admission for outpatient IV therapy is "new over the past few months".   Patient decided to take oral Zyvox and Levaquin, stating that she did "agree" with starting antibiotics as soon as possible. Reviewed taking both antibiotics after eating, separate antibiotics - not taking them together at the same time, use oral anti-emetic and benadryl as premedication.   Patient verbalized her understanding. Erxs for Zyvox and " Levaquin (dose verified with Aura, pharm D) sent to Dr. Harleen Riley who is covering service for Dr. Alvarez today, to sign and transmit to the patient's local [facility].

## 2017-11-20 ENCOUNTER — TELEPHONE (OUTPATIENT)
Dept: TRANSPLANT | Facility: CLINIC | Age: 27
End: 2017-11-20

## 2017-11-20 NOTE — TELEPHONE ENCOUNTER
----- Message from Caitlin Thomason sent at 11/20/2017  9:18 AM CST -----  Patient calling to speak with you about some side affects she's having from her medication,        Please call

## 2017-11-20 NOTE — TELEPHONE ENCOUNTER
Returned patient's call, c/o dizziness, weakness and stomach pain from starting oral antibiotics on Friday 11/17/17.  Patient asking to be admitted for IV antibiotics as she can tolerate that better than oral meds.  (please see prior telephone encounters from Jena Overton Rn) Patient informed that coordinator will speak to Dr. Alvarez regarding this matter and will see if we can set her up locally for IV antibiotics.  Patient verbalized understanding and will wait for a return call from coordinator.

## 2017-12-07 ENCOUNTER — PATIENT MESSAGE (OUTPATIENT)
Dept: TRANSPLANT | Facility: CLINIC | Age: 27
End: 2017-12-07

## 2017-12-11 DIAGNOSIS — E84.19 CYSTIC FIBROSIS WITH GASTROINTESTINAL MANIFESTATIONS: ICD-10-CM

## 2017-12-11 RX ORDER — PANCRELIPASE 24000; 76000; 120000 [USP'U]/1; [USP'U]/1; [USP'U]/1
CAPSULE, DELAYED RELEASE PELLETS ORAL
Qty: 810 CAPSULE | Refills: 0 | Status: SHIPPED | OUTPATIENT
Start: 2017-12-11 | End: 2018-02-19 | Stop reason: SDUPTHER

## 2017-12-11 RX ORDER — TIZANIDINE 4 MG/1
TABLET ORAL
Qty: 90 TABLET | Refills: 0 | Status: SHIPPED | OUTPATIENT
Start: 2017-12-11 | End: 2018-01-18 | Stop reason: SDUPTHER

## 2017-12-11 RX ORDER — FLUTICASONE PROPIONATE 50 MCG
SPRAY, SUSPENSION (ML) NASAL
Qty: 16 G | Refills: 5 | Status: SHIPPED | OUTPATIENT
Start: 2017-12-11 | End: 2018-01-01 | Stop reason: SDUPTHER

## 2017-12-13 ENCOUNTER — PATIENT MESSAGE (OUTPATIENT)
Dept: TRANSPLANT | Facility: CLINIC | Age: 27
End: 2017-12-13

## 2017-12-21 DIAGNOSIS — Z94.2 LUNG REPLACED BY TRANSPLANT: ICD-10-CM

## 2017-12-21 DIAGNOSIS — K21.9 GASTROESOPHAGEAL REFLUX DISEASE WITHOUT ESOPHAGITIS: ICD-10-CM

## 2017-12-21 DIAGNOSIS — J30.9 ALLERGIC RHINITIS: ICD-10-CM

## 2017-12-21 DIAGNOSIS — Z29.89 NEED FOR PNEUMOCYSTIS PROPHYLAXIS: ICD-10-CM

## 2017-12-21 RX ORDER — PREDNISONE 5 MG/1
TABLET ORAL
Qty: 30 TABLET | Refills: 11 | Status: SHIPPED | OUTPATIENT
Start: 2017-12-21 | End: 2018-01-15 | Stop reason: SDUPTHER

## 2017-12-21 RX ORDER — MONTELUKAST SODIUM 10 MG/1
TABLET ORAL
Qty: 30 TABLET | Refills: 11 | Status: SHIPPED | OUTPATIENT
Start: 2017-12-21 | End: 2018-07-28 | Stop reason: SDUPTHER

## 2017-12-21 RX ORDER — OMEPRAZOLE 40 MG/1
CAPSULE, DELAYED RELEASE ORAL
Qty: 30 CAPSULE | Refills: 11 | Status: SHIPPED | OUTPATIENT
Start: 2017-12-21 | End: 2018-01-01 | Stop reason: SDUPTHER

## 2017-12-21 RX ORDER — DAPSONE 100 MG/1
TABLET ORAL
Qty: 30 TABLET | Refills: 11 | Status: SHIPPED | OUTPATIENT
Start: 2017-12-21 | End: 2018-07-28 | Stop reason: SDUPTHER

## 2018-01-01 ENCOUNTER — TELEPHONE (OUTPATIENT)
Dept: TRANSPLANT | Facility: CLINIC | Age: 28
End: 2018-01-01

## 2018-01-01 ENCOUNTER — PATIENT MESSAGE (OUTPATIENT)
Dept: TRANSPLANT | Facility: CLINIC | Age: 28
End: 2018-01-01

## 2018-01-01 ENCOUNTER — OFFICE VISIT (OUTPATIENT)
Dept: TRANSPLANT | Facility: CLINIC | Age: 28
End: 2018-01-01
Payer: MEDICAID

## 2018-01-01 ENCOUNTER — HOSPITAL ENCOUNTER (OUTPATIENT)
Dept: PULMONOLOGY | Facility: HOSPITAL | Age: 28
Discharge: HOME | End: 2018-10-22
Attending: INTERNAL MEDICINE
Payer: MEDICAID

## 2018-01-01 ENCOUNTER — HOSPITAL ENCOUNTER (INPATIENT)
Facility: HOSPITAL | Age: 28
LOS: 8 days | Discharge: HOME | DRG: 166 | End: 2018-10-05
Attending: INTERNAL MEDICINE | Admitting: INTERNAL MEDICINE
Payer: MEDICAID

## 2018-01-01 ENCOUNTER — TELEPHONE (OUTPATIENT)
Dept: OTOLARYNGOLOGY | Facility: CLINIC | Age: 28
End: 2018-01-01

## 2018-01-01 ENCOUNTER — ANESTHESIA (OUTPATIENT)
Dept: SURGERY | Facility: HOSPITAL | Age: 28
DRG: 166 | End: 2018-01-01
Payer: MEDICAID

## 2018-01-01 ENCOUNTER — HOSPITAL ENCOUNTER (INPATIENT)
Facility: HOSPITAL | Age: 28
LOS: 7 days | Discharge: HOME | DRG: 917 | End: 2018-11-14
Attending: INTERNAL MEDICINE | Admitting: INTERNAL MEDICINE
Payer: MEDICAID

## 2018-01-01 ENCOUNTER — ANESTHESIA EVENT (OUTPATIENT)
Dept: SURGERY | Facility: HOSPITAL | Age: 28
DRG: 166 | End: 2018-01-01
Payer: MEDICAID

## 2018-01-01 ENCOUNTER — HOSPITAL ENCOUNTER (OUTPATIENT)
Dept: RADIOLOGY | Facility: HOSPITAL | Age: 28
Discharge: HOME | End: 2018-10-22
Attending: INTERNAL MEDICINE
Payer: MEDICAID

## 2018-01-01 ENCOUNTER — OFFICE VISIT (OUTPATIENT)
Dept: OTOLARYNGOLOGY | Facility: CLINIC | Age: 28
End: 2018-01-01
Payer: MEDICAID

## 2018-01-01 ENCOUNTER — HOSPITAL ENCOUNTER (INPATIENT)
Facility: HOSPITAL | Age: 28
LOS: 8 days | Discharge: HOME | DRG: 166 | End: 2018-12-12
Attending: EMERGENCY MEDICINE | Admitting: INTERNAL MEDICINE
Payer: MEDICAID

## 2018-01-01 ENCOUNTER — HOSPITAL ENCOUNTER (OUTPATIENT)
Dept: PULMONOLOGY | Facility: HOSPITAL | Age: 28
Discharge: HOME | End: 2018-11-26
Attending: INTERNAL MEDICINE
Payer: MEDICAID

## 2018-01-01 ENCOUNTER — DOCUMENTATION ONLY (OUTPATIENT)
Dept: TRANSPLANT | Facility: CLINIC | Age: 28
End: 2018-01-01

## 2018-01-01 ENCOUNTER — HOSPITAL ENCOUNTER (OUTPATIENT)
Dept: RADIOLOGY | Facility: HOSPITAL | Age: 28
Discharge: HOME | End: 2018-11-26
Attending: INTERNAL MEDICINE
Payer: MEDICAID

## 2018-01-01 VITALS
OXYGEN SATURATION: 95 % | HEART RATE: 95 BPM | DIASTOLIC BLOOD PRESSURE: 84 MMHG | HEIGHT: 61 IN | TEMPERATURE: 97 F | SYSTOLIC BLOOD PRESSURE: 123 MMHG | WEIGHT: 125 LBS | RESPIRATION RATE: 20 BRPM | BODY MASS INDEX: 23.6 KG/M2

## 2018-01-01 VITALS
BODY MASS INDEX: 25.14 KG/M2 | TEMPERATURE: 98 F | OXYGEN SATURATION: 95 % | RESPIRATION RATE: 18 BRPM | DIASTOLIC BLOOD PRESSURE: 86 MMHG | HEIGHT: 61 IN | HEART RATE: 76 BPM | WEIGHT: 133.19 LBS | SYSTOLIC BLOOD PRESSURE: 131 MMHG

## 2018-01-01 VITALS
TEMPERATURE: 96 F | HEIGHT: 61 IN | DIASTOLIC BLOOD PRESSURE: 68 MMHG | OXYGEN SATURATION: 97 % | WEIGHT: 127 LBS | SYSTOLIC BLOOD PRESSURE: 120 MMHG | HEART RATE: 120 BPM | RESPIRATION RATE: 20 BRPM | BODY MASS INDEX: 23.98 KG/M2

## 2018-01-01 VITALS
TEMPERATURE: 98 F | OXYGEN SATURATION: 97 % | HEART RATE: 82 BPM | DIASTOLIC BLOOD PRESSURE: 73 MMHG | HEIGHT: 60 IN | WEIGHT: 123.44 LBS | BODY MASS INDEX: 24.23 KG/M2 | SYSTOLIC BLOOD PRESSURE: 116 MMHG | RESPIRATION RATE: 18 BRPM

## 2018-01-01 VITALS
DIASTOLIC BLOOD PRESSURE: 95 MMHG | OXYGEN SATURATION: 92 % | TEMPERATURE: 98 F | HEART RATE: 112 BPM | WEIGHT: 129.44 LBS | RESPIRATION RATE: 19 BRPM | SYSTOLIC BLOOD PRESSURE: 142 MMHG | HEIGHT: 61 IN | BODY MASS INDEX: 24.44 KG/M2

## 2018-01-01 VITALS
BODY MASS INDEX: 23.62 KG/M2 | TEMPERATURE: 98 F | HEART RATE: 129 BPM | SYSTOLIC BLOOD PRESSURE: 123 MMHG | DIASTOLIC BLOOD PRESSURE: 78 MMHG | WEIGHT: 125 LBS

## 2018-01-01 DIAGNOSIS — Z48.298 ENCOUNTER FOLLOWING ORGAN TRANSPLANT: Primary | ICD-10-CM

## 2018-01-01 DIAGNOSIS — J38.01 UNILATERAL COMPLETE VOCAL FOLD PARALYSIS: ICD-10-CM

## 2018-01-01 DIAGNOSIS — Z94.2 LUNG REPLACED BY TRANSPLANT: ICD-10-CM

## 2018-01-01 DIAGNOSIS — G93.41 ACUTE METABOLIC ENCEPHALOPATHY: ICD-10-CM

## 2018-01-01 DIAGNOSIS — R74.8 ELEVATED LIVER ENZYMES: Primary | ICD-10-CM

## 2018-01-01 DIAGNOSIS — K21.9 GASTROESOPHAGEAL REFLUX DISEASE WITHOUT ESOPHAGITIS: ICD-10-CM

## 2018-01-01 DIAGNOSIS — R16.1 SPLENOMEGALY: ICD-10-CM

## 2018-01-01 DIAGNOSIS — R06.02 SOB (SHORTNESS OF BREATH): ICD-10-CM

## 2018-01-01 DIAGNOSIS — Z94.2 LUNG REPLACED BY TRANSPLANT: Primary | ICD-10-CM

## 2018-01-01 DIAGNOSIS — Z79.2 PROPHYLACTIC ANTIBIOTIC: ICD-10-CM

## 2018-01-01 DIAGNOSIS — J44.81 BRONCHIOLITIS OBLITERANS SYNDROME: ICD-10-CM

## 2018-01-01 DIAGNOSIS — Z94.2 LUNG TRANSPLANT STATUS, BILATERAL: ICD-10-CM

## 2018-01-01 DIAGNOSIS — J32.9 CHRONIC SINUSITIS, UNSPECIFIED LOCATION: ICD-10-CM

## 2018-01-01 DIAGNOSIS — R06.2 WHEEZING: ICD-10-CM

## 2018-01-01 DIAGNOSIS — D84.9 IMMUNOSUPPRESSION: ICD-10-CM

## 2018-01-01 DIAGNOSIS — E87.5 HYPERKALEMIA, DIMINISHED RENAL EXCRETION: ICD-10-CM

## 2018-01-01 DIAGNOSIS — E84.9 CYSTIC FIBROSIS: ICD-10-CM

## 2018-01-01 DIAGNOSIS — R05.9 COUGH: ICD-10-CM

## 2018-01-01 DIAGNOSIS — G89.4 CHRONIC PAIN SYNDROME: ICD-10-CM

## 2018-01-01 DIAGNOSIS — R49.0 DYSPHONIA: Primary | ICD-10-CM

## 2018-01-01 DIAGNOSIS — N17.0 ACUTE KIDNEY FAILURE WITH LESION OF TUBULAR NECROSIS: Primary | ICD-10-CM

## 2018-01-01 DIAGNOSIS — A41.9 SEPSIS: ICD-10-CM

## 2018-01-01 DIAGNOSIS — J96.11 CHRONIC RESPIRATORY FAILURE WITH HYPOXIA: ICD-10-CM

## 2018-01-01 DIAGNOSIS — I10 HYPERTENSION, UNSPECIFIED TYPE: Primary | ICD-10-CM

## 2018-01-01 DIAGNOSIS — K86.89 PANCREATIC INSUFFICIENCY: ICD-10-CM

## 2018-01-01 DIAGNOSIS — Z94.2 LUNG TRANSPLANT STATUS, BILATERAL: Primary | ICD-10-CM

## 2018-01-01 DIAGNOSIS — N17.9 ACUTE RENAL FAILURE, UNSPECIFIED ACUTE RENAL FAILURE TYPE: ICD-10-CM

## 2018-01-01 DIAGNOSIS — R06.2 WHEEZING: Primary | ICD-10-CM

## 2018-01-01 DIAGNOSIS — R11.0 NAUSEA: ICD-10-CM

## 2018-01-01 DIAGNOSIS — A41.9 SEPSIS, DUE TO UNSPECIFIED ORGANISM: ICD-10-CM

## 2018-01-01 DIAGNOSIS — R07.9 CHEST PAIN: ICD-10-CM

## 2018-01-01 DIAGNOSIS — Z94.2 S/P LUNG TRANSPLANT: ICD-10-CM

## 2018-01-01 DIAGNOSIS — A49.8 PSEUDOMONAS AERUGINOSA INFECTION: ICD-10-CM

## 2018-01-01 DIAGNOSIS — J44.81 BRONCHIOLITIS OBLITERANS SYNDROME: Primary | ICD-10-CM

## 2018-01-01 DIAGNOSIS — K86.81 EXOCRINE PANCREATIC INSUFFICIENCY: ICD-10-CM

## 2018-01-01 DIAGNOSIS — A49.8 PSEUDOMONAS AERUGINOSA INFECTION: Primary | ICD-10-CM

## 2018-01-01 DIAGNOSIS — Z76.82 AWAITING TRANSPLANTATION OF LUNG: Primary | ICD-10-CM

## 2018-01-01 DIAGNOSIS — J47.9 BRONCHIECTASIS WITHOUT COMPLICATION: Primary | ICD-10-CM

## 2018-01-01 DIAGNOSIS — M85.80 OSTEOPENIA: ICD-10-CM

## 2018-01-01 DIAGNOSIS — R09.02 HYPOXIA: ICD-10-CM

## 2018-01-01 DIAGNOSIS — N18.30 CHRONIC KIDNEY DISEASE (CKD), STAGE III (MODERATE): ICD-10-CM

## 2018-01-01 DIAGNOSIS — Z79.899 USES NEBULIZER AND INHALER AT HOME: ICD-10-CM

## 2018-01-01 DIAGNOSIS — R74.01 TRANSAMINITIS: ICD-10-CM

## 2018-01-01 DIAGNOSIS — N17.8 ACUTE RENAL FAILURE WITH OTHER SPECIFIED PATHOLOGICAL LESION IN KIDNEY: ICD-10-CM

## 2018-01-01 DIAGNOSIS — Z76.82 LUNG TRANSPLANT CANDIDATE: ICD-10-CM

## 2018-01-01 DIAGNOSIS — J22 LRTI (LOWER RESPIRATORY TRACT INFECTION): ICD-10-CM

## 2018-01-01 DIAGNOSIS — M62.830 MUSCLE SPASM OF BACK: ICD-10-CM

## 2018-01-01 DIAGNOSIS — E83.42 HYPOMAGNESEMIA: ICD-10-CM

## 2018-01-01 LAB
1,3 BETA GLUCAN SPEC-MCNC: 66 PG/ML
1,3 BETA GLUCAN SPEC-MCNC: 75 PG/ML
1,3 BETA GLUCAN SPEC-MCNC: NORMAL PG/ML
ABO + RH BLD: NORMAL
ABO + RH BLD: NORMAL
ACID FAST MOD KINY STN SPEC: NORMAL
ACID FAST MOD KINY STN SPEC: NORMAL
ALBUMIN SERPL BCP-MCNC: 2.5 G/DL
ALBUMIN SERPL BCP-MCNC: 2.6 G/DL
ALBUMIN SERPL BCP-MCNC: 2.7 G/DL
ALBUMIN SERPL BCP-MCNC: 2.8 G/DL
ALBUMIN SERPL BCP-MCNC: 3 G/DL
ALBUMIN SERPL BCP-MCNC: 3 G/DL
ALBUMIN SERPL BCP-MCNC: 3.1 G/DL
ALBUMIN SERPL BCP-MCNC: 3.2 G/DL
ALBUMIN SERPL BCP-MCNC: 3.3 G/DL
ALBUMIN SERPL BCP-MCNC: 3.4 G/DL
ALBUMIN SERPL BCP-MCNC: 3.5 G/DL
ALBUMIN SERPL BCP-MCNC: 3.6 G/DL
ALBUMIN SERPL BCP-MCNC: 3.7 G/DL
ALBUMIN SERPL BCP-MCNC: 3.7 G/DL
ALBUMIN SERPL BCP-MCNC: 3.8 G/DL
ALLENS TEST: ABNORMAL
ALP SERPL-CCNC: 128 U/L
ALP SERPL-CCNC: 141 U/L
ALP SERPL-CCNC: 145 U/L
ALP SERPL-CCNC: 147 U/L
ALP SERPL-CCNC: 148 U/L
ALP SERPL-CCNC: 148 U/L
ALP SERPL-CCNC: 150 U/L
ALP SERPL-CCNC: 154 U/L
ALP SERPL-CCNC: 155 U/L
ALP SERPL-CCNC: 156 U/L
ALP SERPL-CCNC: 163 U/L
ALP SERPL-CCNC: 164 U/L
ALP SERPL-CCNC: 166 U/L
ALP SERPL-CCNC: 166 U/L
ALP SERPL-CCNC: 170 U/L
ALP SERPL-CCNC: 189 U/L
ALP SERPL-CCNC: 192 U/L
ALP SERPL-CCNC: 222 U/L
ALP SERPL-CCNC: 306 U/L
ALP SERPL-CCNC: 342 U/L
ALT SERPL W/O P-5'-P-CCNC: 124 U/L
ALT SERPL W/O P-5'-P-CCNC: 159 U/L
ALT SERPL W/O P-5'-P-CCNC: 17 U/L
ALT SERPL W/O P-5'-P-CCNC: 19 U/L
ALT SERPL W/O P-5'-P-CCNC: 19 U/L
ALT SERPL W/O P-5'-P-CCNC: 21 U/L
ALT SERPL W/O P-5'-P-CCNC: 26 U/L
ALT SERPL W/O P-5'-P-CCNC: 28 U/L
ALT SERPL W/O P-5'-P-CCNC: 29 U/L
ALT SERPL W/O P-5'-P-CCNC: 30 U/L
ALT SERPL W/O P-5'-P-CCNC: 32 U/L
ALT SERPL W/O P-5'-P-CCNC: 34 U/L
ALT SERPL W/O P-5'-P-CCNC: 35 U/L
ALT SERPL W/O P-5'-P-CCNC: 41 U/L
ALT SERPL W/O P-5'-P-CCNC: 52 U/L
ALT SERPL W/O P-5'-P-CCNC: 72 U/L
AMPHET+METHAMPHET UR QL: NEGATIVE
ANION GAP SERPL CALC-SCNC: 10 MMOL/L
ANION GAP SERPL CALC-SCNC: 11 MMOL/L
ANION GAP SERPL CALC-SCNC: 12 MMOL/L
ANION GAP SERPL CALC-SCNC: 12 MMOL/L
ANION GAP SERPL CALC-SCNC: 17 MMOL/L
ANION GAP SERPL CALC-SCNC: 2 MMOL/L
ANION GAP SERPL CALC-SCNC: 5 MMOL/L
ANION GAP SERPL CALC-SCNC: 6 MMOL/L
ANION GAP SERPL CALC-SCNC: 7 MMOL/L
ANION GAP SERPL CALC-SCNC: 8 MMOL/L
ANION GAP SERPL CALC-SCNC: 9 MMOL/L
ANISOCYTOSIS BLD QL SMEAR: SLIGHT
APPEARANCE FLD: NORMAL
ASCENDING AORTA: 2.46 CM
AST SERPL-CCNC: 10 U/L
AST SERPL-CCNC: 13 U/L
AST SERPL-CCNC: 13 U/L
AST SERPL-CCNC: 136 U/L
AST SERPL-CCNC: 16 U/L
AST SERPL-CCNC: 18 U/L
AST SERPL-CCNC: 19 U/L
AST SERPL-CCNC: 19 U/L
AST SERPL-CCNC: 21 U/L
AST SERPL-CCNC: 21 U/L
AST SERPL-CCNC: 22 U/L
AST SERPL-CCNC: 239 U/L
AST SERPL-CCNC: 25 U/L
AST SERPL-CCNC: 295 U/L
AST SERPL-CCNC: 32 U/L
AST SERPL-CCNC: 34 U/L
AST SERPL-CCNC: 34 U/L
AST SERPL-CCNC: 42 U/L
AST SERPL-CCNC: 45 U/L
AST SERPL-CCNC: 9 U/L
AV INDEX (PROSTH): 1.07
AV MEAN GRADIENT: 2.16 MMHG
AV PEAK GRADIENT: 3.24 MMHG
AV VALVE AREA: 3.26 CM2
B-HCG UR QL: NEGATIVE
BACTERIA #/AREA URNS AUTO: ABNORMAL /HPF
BACTERIA #/AREA URNS AUTO: ABNORMAL /HPF
BACTERIA BLD CULT: NORMAL
BACTERIA CSF CULT: NO GROWTH
BACTERIA SPEC AEROBE CULT: NORMAL
BACTERIA UR CULT: NO GROWTH
BARBITURATES UR QL SCN>200 NG/ML: NORMAL
BASO STIPL BLD QL SMEAR: ABNORMAL
BASOPHILS # BLD AUTO: 0 K/UL
BASOPHILS # BLD AUTO: 0.01 K/UL
BASOPHILS # BLD AUTO: 0.02 K/UL
BASOPHILS # BLD AUTO: 0.03 K/UL
BASOPHILS # BLD AUTO: 0.04 K/UL
BASOPHILS NFR BLD: 0 %
BASOPHILS NFR BLD: 0.1 %
BASOPHILS NFR BLD: 0.1 %
BASOPHILS NFR BLD: 0.2 %
BASOPHILS NFR BLD: 0.3 %
BASOPHILS NFR BLD: 0.4 %
BASOPHILS NFR BLD: 0.5 %
BASOPHILS NFR BLD: 0.6 %
BASOPHILS NFR BLD: 0.7 %
BASOPHILS NFR BLD: 0.7 %
BENZODIAZ UR QL SCN>200 NG/ML: NEGATIVE
BILIRUB DIRECT SERPL-MCNC: 0.1 MG/DL
BILIRUB SERPL-MCNC: 0.2 MG/DL
BILIRUB SERPL-MCNC: 0.3 MG/DL
BILIRUB SERPL-MCNC: 0.4 MG/DL
BILIRUB SERPL-MCNC: 0.4 MG/DL
BILIRUB SERPL-MCNC: 0.5 MG/DL
BILIRUB SERPL-MCNC: 0.6 MG/DL
BILIRUB UR QL STRIP: NEGATIVE
BILIRUB UR QL STRIP: NEGATIVE
BLD GP AB SCN CELLS X3 SERPL QL: NORMAL
BLD GP AB SCN CELLS X3 SERPL QL: NORMAL
BLD PROD TYP BPU: NORMAL
BLD PROD TYP BPU: NORMAL
BLOOD UNIT EXPIRATION DATE: NORMAL
BLOOD UNIT EXPIRATION DATE: NORMAL
BLOOD UNIT TYPE CODE: 7300
BLOOD UNIT TYPE CODE: 7300
BLOOD UNIT TYPE: NORMAL
BLOOD UNIT TYPE: NORMAL
BNP SERPL-MCNC: 84 PG/ML
BODY FLD TYPE: NORMAL
BSA FOR ECHO PROCEDURE: 1.55 M2
BUN SERPL-MCNC: 12 MG/DL
BUN SERPL-MCNC: 12 MG/DL
BUN SERPL-MCNC: 14 MG/DL
BUN SERPL-MCNC: 15 MG/DL
BUN SERPL-MCNC: 15 MG/DL
BUN SERPL-MCNC: 16 MG/DL
BUN SERPL-MCNC: 16 MG/DL
BUN SERPL-MCNC: 18 MG/DL
BUN SERPL-MCNC: 19 MG/DL
BUN SERPL-MCNC: 20 MG/DL
BUN SERPL-MCNC: 21 MG/DL
BUN SERPL-MCNC: 21 MG/DL
BUN SERPL-MCNC: 22 MG/DL
BUN SERPL-MCNC: 23 MG/DL
BUN SERPL-MCNC: 25 MG/DL
BUN SERPL-MCNC: 26 MG/DL
BUN SERPL-MCNC: 29 MG/DL
BUN SERPL-MCNC: 30 MG/DL
BUN SERPL-MCNC: 31 MG/DL
BUN SERPL-MCNC: 33 MG/DL
BUN SERPL-MCNC: 34 MG/DL
BUN SERPL-MCNC: 35 MG/DL
BUN SERPL-MCNC: 35 MG/DL
BUN SERPL-MCNC: 5 MG/DL
BUN SERPL-MCNC: 52 MG/DL
BUN SERPL-MCNC: 54 MG/DL
BUN SERPL-MCNC: 7 MG/DL
BUN SERPL-MCNC: 9 MG/DL
BZE UR QL SCN: NEGATIVE
CA-I BLDV-SCNC: 0.94 MMOL/L
CA-I BLDV-SCNC: 0.94 MMOL/L
CALCIUM SERPL-MCNC: 6.9 MG/DL
CALCIUM SERPL-MCNC: 6.9 MG/DL
CALCIUM SERPL-MCNC: 7.1 MG/DL
CALCIUM SERPL-MCNC: 7.1 MG/DL
CALCIUM SERPL-MCNC: 7.2 MG/DL
CALCIUM SERPL-MCNC: 7.2 MG/DL
CALCIUM SERPL-MCNC: 7.3 MG/DL
CALCIUM SERPL-MCNC: 7.4 MG/DL
CALCIUM SERPL-MCNC: 7.5 MG/DL
CALCIUM SERPL-MCNC: 7.6 MG/DL
CALCIUM SERPL-MCNC: 7.8 MG/DL
CALCIUM SERPL-MCNC: 7.9 MG/DL
CALCIUM SERPL-MCNC: 8 MG/DL
CALCIUM SERPL-MCNC: 8.1 MG/DL
CALCIUM SERPL-MCNC: 8.1 MG/DL
CALCIUM SERPL-MCNC: 8.3 MG/DL
CALCIUM SERPL-MCNC: 8.4 MG/DL
CALCIUM SERPL-MCNC: 8.5 MG/DL
CALCIUM SERPL-MCNC: 8.5 MG/DL
CALCIUM SERPL-MCNC: 8.6 MG/DL
CALCIUM SERPL-MCNC: 8.6 MG/DL
CALCIUM SERPL-MCNC: 8.8 MG/DL
CALCIUM SERPL-MCNC: 8.9 MG/DL
CALCIUM SERPL-MCNC: 9 MG/DL
CANNABINOIDS UR QL SCN: NEGATIVE
CHLORIDE SERPL-SCNC: 102 MMOL/L
CHLORIDE SERPL-SCNC: 102 MMOL/L
CHLORIDE SERPL-SCNC: 103 MMOL/L
CHLORIDE SERPL-SCNC: 104 MMOL/L
CHLORIDE SERPL-SCNC: 105 MMOL/L
CHLORIDE SERPL-SCNC: 106 MMOL/L
CHLORIDE SERPL-SCNC: 107 MMOL/L
CHLORIDE SERPL-SCNC: 108 MMOL/L
CHLORIDE SERPL-SCNC: 109 MMOL/L
CHLORIDE SERPL-SCNC: 109 MMOL/L
CHLORIDE SERPL-SCNC: 110 MMOL/L
CHLORIDE SERPL-SCNC: 110 MMOL/L
CHLORIDE SERPL-SCNC: 112 MMOL/L
CHLORIDE SERPL-SCNC: 113 MMOL/L
CHLORIDE SERPL-SCNC: 116 MMOL/L
CLARITY CSF: ABNORMAL
CLARITY UR REFRACT.AUTO: ABNORMAL
CLARITY UR REFRACT.AUTO: CLEAR
CLASS I ANTIBODY COMMENTS - LUMINEX: NORMAL
CLASS II ANTIBODIES - LUMINEX: NEGATIVE
CLASS II ANTIBODY COMMENTS - LUMINEX: NORMAL
CMV DNA # SERPL NAA+PROBE: <390 CPY/ML
CMV DNA SERPL NAA+PROBE-ACNC: <227 IU/ML
CMV DNA SERPL NAA+PROBE-ACNC: NORMAL IU/ML
CMV DNA SERPL NAA+PROBE-ACNC: NORMAL IU/ML
CMV DNA SERPL NAA+PROBE-LOG IU: <2.4 LOG IU/ML
CMV DNA SERPL NAA+PROBE-LOG#: <2.6 LOG CPY/ML
CMV GENE MUT DET ISLT: NOT DETECTED
CO2 SERPL-SCNC: 17 MMOL/L
CO2 SERPL-SCNC: 21 MMOL/L
CO2 SERPL-SCNC: 22 MMOL/L
CO2 SERPL-SCNC: 24 MMOL/L
CO2 SERPL-SCNC: 25 MMOL/L
CO2 SERPL-SCNC: 25 MMOL/L
CO2 SERPL-SCNC: 26 MMOL/L
CO2 SERPL-SCNC: 27 MMOL/L
CO2 SERPL-SCNC: 28 MMOL/L
CO2 SERPL-SCNC: 29 MMOL/L
CO2 SERPL-SCNC: 30 MMOL/L
CO2 SERPL-SCNC: 31 MMOL/L
CO2 SERPL-SCNC: 32 MMOL/L
CO2 SERPL-SCNC: 34 MMOL/L
CO2 SERPL-SCNC: 9 MMOL/L
CODING SYSTEM: NORMAL
CODING SYSTEM: NORMAL
COLOR CSF: ABNORMAL
COLOR FLD: COLORLESS
COLOR UR AUTO: YELLOW
COLOR UR AUTO: YELLOW
CREAT SERPL-MCNC: 1.1 MG/DL
CREAT SERPL-MCNC: 1.2 MG/DL
CREAT SERPL-MCNC: 1.3 MG/DL
CREAT SERPL-MCNC: 1.8 MG/DL
CREAT SERPL-MCNC: 1.8 MG/DL
CREAT SERPL-MCNC: 2.3 MG/DL
CREAT SERPL-MCNC: 2.3 MG/DL
CREAT SERPL-MCNC: 2.4 MG/DL
CREAT SERPL-MCNC: 2.6 MG/DL
CREAT SERPL-MCNC: 3.1 MG/DL
CREAT SERPL-MCNC: 3.2 MG/DL
CREAT SERPL-MCNC: 3.5 MG/DL
CREAT SERPL-MCNC: 3.7 MG/DL
CREAT SERPL-MCNC: 3.7 MG/DL
CREAT SERPL-MCNC: 3.8 MG/DL
CREAT SERPL-MCNC: 3.9 MG/DL
CREAT SERPL-MCNC: 4.1 MG/DL
CREAT SERPL-MCNC: 4.2 MG/DL
CREAT SERPL-MCNC: 4.3 MG/DL
CREAT SERPL-MCNC: 4.3 MG/DL
CREAT SERPL-MCNC: 6 MG/DL
CREAT SERPL-MCNC: 6.1 MG/DL
CREAT UR-MCNC: 100 MG/DL
CREAT UR-MCNC: 90 MG/DL
CRYPTOC AG CSF QL LA: NEGATIVE
CRYPTOC AG SER QL LA: NEGATIVE
CV ECHO LV RWT: 0.3 CM
DELSYS: ABNORMAL
DIFFERENTIAL METHOD: ABNORMAL
DISPENSE STATUS: NORMAL
DISPENSE STATUS: NORMAL
DOP CALC AO PEAK VEL: 0.9 M/S
DOP CALC AO VTI: 14.68 CM
DOP CALC LVOT AREA: 3.05 CM2
DOP CALC LVOT DIAMETER: 1.97 CM
DOP CALC LVOT STROKE VOLUME: 47.92 CM3
DOP CALCLVOT PEAK VEL VTI: 15.73 CM
DSA1 TESTING DATE: NORMAL
DSA12 TESTING DATE: NORMAL
DSA2 TESTING DATE: NORMAL
E WAVE DECELERATION TIME: 132.01 MSEC
E/A RATIO: 1.51
E/E' RATIO: 5.92
ECHO LV POSTERIOR WALL: 0.7 CM (ref 0.6–1.1)
ENTEROVIRUS: NOT DETECTED
EOSINOPHIL # BLD AUTO: 0 K/UL
EOSINOPHIL # BLD AUTO: 0.1 K/UL
EOSINOPHIL # BLD AUTO: 0.2 K/UL
EOSINOPHIL # BLD AUTO: 0.3 K/UL
EOSINOPHIL # BLD AUTO: 0.4 K/UL
EOSINOPHIL # BLD AUTO: 0.5 K/UL
EOSINOPHIL NFR BLD: 0 %
EOSINOPHIL NFR BLD: 0.1 %
EOSINOPHIL NFR BLD: 0.4 %
EOSINOPHIL NFR BLD: 1.8 %
EOSINOPHIL NFR BLD: 1.9 %
EOSINOPHIL NFR BLD: 1.9 %
EOSINOPHIL NFR BLD: 2.4 %
EOSINOPHIL NFR BLD: 4.6 %
EOSINOPHIL NFR BLD: 5.3 %
EOSINOPHIL NFR BLD: 5.4 %
EOSINOPHIL NFR BLD: 6.7 %
EOSINOPHIL NFR BLD: 6.8 %
EOSINOPHIL NFR BLD: 6.9 %
EOSINOPHIL NFR BLD: 7.3 %
EOSINOPHIL NFR BLD: 8.4 %
EOSINOPHIL NFR BLD: 8.4 %
EOSINOPHIL NFR BLD: 8.6 %
EOSINOPHIL NFR BLD: 8.6 %
EOSINOPHIL NFR BLD: 8.7 %
EOSINOPHIL NFR BLD: 9 %
EOSINOPHIL NFR BLD: 9.4 %
EOSINOPHIL NFR BLD: 9.5 %
EOSINOPHIL NFR CSF MANUAL: 4 %
EOSINOPHIL NFR FLD MANUAL: 13 %
ERYTHROCYTE [DISTWIDTH] IN BLOOD BY AUTOMATED COUNT: 14.4 %
ERYTHROCYTE [DISTWIDTH] IN BLOOD BY AUTOMATED COUNT: 14.6 %
ERYTHROCYTE [DISTWIDTH] IN BLOOD BY AUTOMATED COUNT: 14.7 %
ERYTHROCYTE [DISTWIDTH] IN BLOOD BY AUTOMATED COUNT: 14.8 %
ERYTHROCYTE [DISTWIDTH] IN BLOOD BY AUTOMATED COUNT: 14.9 %
ERYTHROCYTE [DISTWIDTH] IN BLOOD BY AUTOMATED COUNT: 15.1 %
ERYTHROCYTE [DISTWIDTH] IN BLOOD BY AUTOMATED COUNT: 15.1 %
ERYTHROCYTE [DISTWIDTH] IN BLOOD BY AUTOMATED COUNT: 15.4 %
ERYTHROCYTE [DISTWIDTH] IN BLOOD BY AUTOMATED COUNT: 15.5 %
ERYTHROCYTE [DISTWIDTH] IN BLOOD BY AUTOMATED COUNT: 16 %
ERYTHROCYTE [DISTWIDTH] IN BLOOD BY AUTOMATED COUNT: 16 %
ERYTHROCYTE [DISTWIDTH] IN BLOOD BY AUTOMATED COUNT: 16.3 %
ERYTHROCYTE [DISTWIDTH] IN BLOOD BY AUTOMATED COUNT: 16.4 %
ERYTHROCYTE [DISTWIDTH] IN BLOOD BY AUTOMATED COUNT: 16.4 %
ERYTHROCYTE [DISTWIDTH] IN BLOOD BY AUTOMATED COUNT: 16.6 %
ERYTHROCYTE [DISTWIDTH] IN BLOOD BY AUTOMATED COUNT: 16.7 %
ERYTHROCYTE [DISTWIDTH] IN BLOOD BY AUTOMATED COUNT: 16.8 %
ERYTHROCYTE [DISTWIDTH] IN BLOOD BY AUTOMATED COUNT: 17.1 %
ERYTHROCYTE [DISTWIDTH] IN BLOOD BY AUTOMATED COUNT: 17.2 %
ERYTHROCYTE [DISTWIDTH] IN BLOOD BY AUTOMATED COUNT: 17.4 %
ERYTHROCYTE [DISTWIDTH] IN BLOOD BY AUTOMATED COUNT: 17.5 %
EST. GFR  (AFRICAN AMERICAN): 10.1 ML/MIN/1.73 M^2
EST. GFR  (AFRICAN AMERICAN): 15.1 ML/MIN/1.73 M^2
EST. GFR  (AFRICAN AMERICAN): 15.1 ML/MIN/1.73 M^2
EST. GFR  (AFRICAN AMERICAN): 15.5 ML/MIN/1.73 M^2
EST. GFR  (AFRICAN AMERICAN): 16 ML/MIN/1.73 M^2
EST. GFR  (AFRICAN AMERICAN): 17 ML/MIN/1.73 M^2
EST. GFR  (AFRICAN AMERICAN): 17.5 ML/MIN/1.73 M^2
EST. GFR  (AFRICAN AMERICAN): 18.1 ML/MIN/1.73 M^2
EST. GFR  (AFRICAN AMERICAN): 18.1 ML/MIN/1.73 M^2
EST. GFR  (AFRICAN AMERICAN): 19.3 ML/MIN/1.73 M^2
EST. GFR  (AFRICAN AMERICAN): 21.6 ML/MIN/1.73 M^2
EST. GFR  (AFRICAN AMERICAN): 22.4 ML/MIN/1.73 M^2
EST. GFR  (AFRICAN AMERICAN): 27.7 ML/MIN/1.73 M^2
EST. GFR  (AFRICAN AMERICAN): 30.5 ML/MIN/1.73 M^2
EST. GFR  (AFRICAN AMERICAN): 32.1 ML/MIN/1.73 M^2
EST. GFR  (AFRICAN AMERICAN): 32.1 ML/MIN/1.73 M^2
EST. GFR  (AFRICAN AMERICAN): 43.2 ML/MIN/1.73 M^2
EST. GFR  (AFRICAN AMERICAN): 43.2 ML/MIN/1.73 M^2
EST. GFR  (AFRICAN AMERICAN): 9.9 ML/MIN/1.73 M^2
EST. GFR  (AFRICAN AMERICAN): >60 ML/MIN/1.73 M^2
EST. GFR  (NON AFRICAN AMERICAN): 13.1 ML/MIN/1.73 M^2
EST. GFR  (NON AFRICAN AMERICAN): 13.1 ML/MIN/1.73 M^2
EST. GFR  (NON AFRICAN AMERICAN): 13.5 ML/MIN/1.73 M^2
EST. GFR  (NON AFRICAN AMERICAN): 13.9 ML/MIN/1.73 M^2
EST. GFR  (NON AFRICAN AMERICAN): 14.7 ML/MIN/1.73 M^2
EST. GFR  (NON AFRICAN AMERICAN): 15.2 ML/MIN/1.73 M^2
EST. GFR  (NON AFRICAN AMERICAN): 15.7 ML/MIN/1.73 M^2
EST. GFR  (NON AFRICAN AMERICAN): 15.7 ML/MIN/1.73 M^2
EST. GFR  (NON AFRICAN AMERICAN): 16.8 ML/MIN/1.73 M^2
EST. GFR  (NON AFRICAN AMERICAN): 18.7 ML/MIN/1.73 M^2
EST. GFR  (NON AFRICAN AMERICAN): 19.4 ML/MIN/1.73 M^2
EST. GFR  (NON AFRICAN AMERICAN): 24 ML/MIN/1.73 M^2
EST. GFR  (NON AFRICAN AMERICAN): 26.5 ML/MIN/1.73 M^2
EST. GFR  (NON AFRICAN AMERICAN): 27.9 ML/MIN/1.73 M^2
EST. GFR  (NON AFRICAN AMERICAN): 27.9 ML/MIN/1.73 M^2
EST. GFR  (NON AFRICAN AMERICAN): 37.5 ML/MIN/1.73 M^2
EST. GFR  (NON AFRICAN AMERICAN): 37.5 ML/MIN/1.73 M^2
EST. GFR  (NON AFRICAN AMERICAN): 55.6 ML/MIN/1.73 M^2
EST. GFR  (NON AFRICAN AMERICAN): 8.6 ML/MIN/1.73 M^2
EST. GFR  (NON AFRICAN AMERICAN): 8.7 ML/MIN/1.73 M^2
EST. GFR  (NON AFRICAN AMERICAN): >60 ML/MIN/1.73 M^2
ETHANOL UR-MCNC: <10 MG/DL
EXT ALBUMIN: 4.2
EXT ALKALINE PHOSPHATASE: 166
EXT ALT: 60
EXT AST: 18
EXT BILIRUBIN TOTAL: 0.4
EXT BUN: 27
EXT CALCIUM: 8.9
EXT CHLORIDE: 106
EXT CO2: 28
EXT CREATININE: 1.6 MG/DL
EXT GFR MDRD NON AF AMER: 40.5
EXT GLUCOSE: 112
EXT POTASSIUM: 4.3
EXT PROTEIN TOTAL: 7.1
EXT SODIUM: 145 MMOL/L
EXT TACROLIMUS LVL: 4.1
FERRITIN SERPL-MCNC: 33 NG/ML
FERRITIN SERPL-MCNC: 7 NG/ML
FIO2: 24
FLOW: 1
FLOW: 2
FOLATE SERPL-MCNC: 10.2 NG/ML
FOLATE SERPL-MCNC: 13.3 NG/ML
FRACTIONAL SHORTENING: 25 % (ref 28–44)
FUNGUS BLD CULT: NORMAL
FUNGUS SPEC CULT: NORMAL
GALACTOMANNAN AG SERPL IA-ACNC: <0.5 INDEX
GALACTOMANNAN AG SERPL IA-ACNC: <0.5 INDEX
GALACTOMANNAN AG SPEC-ACNC: <0.5 INDEX
GIANT PLATELETS BLD QL SMEAR: PRESENT
GLUCOSE CSF-MCNC: 83 MG/DL
GLUCOSE SERPL-MCNC: 100 MG/DL
GLUCOSE SERPL-MCNC: 103 MG/DL
GLUCOSE SERPL-MCNC: 104 MG/DL
GLUCOSE SERPL-MCNC: 107 MG/DL
GLUCOSE SERPL-MCNC: 108 MG/DL
GLUCOSE SERPL-MCNC: 115 MG/DL
GLUCOSE SERPL-MCNC: 115 MG/DL
GLUCOSE SERPL-MCNC: 120 MG/DL
GLUCOSE SERPL-MCNC: 120 MG/DL
GLUCOSE SERPL-MCNC: 123 MG/DL
GLUCOSE SERPL-MCNC: 123 MG/DL
GLUCOSE SERPL-MCNC: 126 MG/DL
GLUCOSE SERPL-MCNC: 132 MG/DL
GLUCOSE SERPL-MCNC: 132 MG/DL
GLUCOSE SERPL-MCNC: 133 MG/DL
GLUCOSE SERPL-MCNC: 134 MG/DL
GLUCOSE SERPL-MCNC: 135 MG/DL
GLUCOSE SERPL-MCNC: 143 MG/DL
GLUCOSE SERPL-MCNC: 158 MG/DL
GLUCOSE SERPL-MCNC: 161 MG/DL
GLUCOSE SERPL-MCNC: 191 MG/DL
GLUCOSE SERPL-MCNC: 203 MG/DL
GLUCOSE SERPL-MCNC: 204 MG/DL
GLUCOSE SERPL-MCNC: 207 MG/DL
GLUCOSE SERPL-MCNC: 207 MG/DL
GLUCOSE SERPL-MCNC: 250 MG/DL
GLUCOSE SERPL-MCNC: 251 MG/DL
GLUCOSE SERPL-MCNC: 53 MG/DL
GLUCOSE SERPL-MCNC: 72 MG/DL
GLUCOSE SERPL-MCNC: 78 MG/DL
GLUCOSE SERPL-MCNC: 78 MG/DL
GLUCOSE SERPL-MCNC: 79 MG/DL
GLUCOSE SERPL-MCNC: 83 MG/DL
GLUCOSE SERPL-MCNC: 86 MG/DL
GLUCOSE SERPL-MCNC: 87 MG/DL
GLUCOSE SERPL-MCNC: 88 MG/DL
GLUCOSE SERPL-MCNC: 96 MG/DL
GLUCOSE SERPL-MCNC: 99 MG/DL
GLUCOSE UR QL STRIP: NEGATIVE
GLUCOSE UR QL STRIP: NEGATIVE
GRAM STN SPEC: NORMAL
HAPTOGLOB SERPL-MCNC: 50 MG/DL
HAV IGM SERPL QL IA: NEGATIVE
HBV CORE IGM SERPL QL IA: NEGATIVE
HBV SURFACE AG SERPL QL IA: NEGATIVE
HCO3 UR-SCNC: 12.2 MMOL/L (ref 24–28)
HCO3 UR-SCNC: 17.1 MMOL/L (ref 24–28)
HCO3 UR-SCNC: 21.8 MMOL/L (ref 24–28)
HCO3 UR-SCNC: 33.5 MMOL/L (ref 24–28)
HCO3 UR-SCNC: 34.8 MMOL/L (ref 24–28)
HCO3 UR-SCNC: 34.9 MMOL/L (ref 24–28)
HCT VFR BLD AUTO: 21.6 %
HCT VFR BLD AUTO: 23 %
HCT VFR BLD AUTO: 23.2 %
HCT VFR BLD AUTO: 23.7 %
HCT VFR BLD AUTO: 23.7 %
HCT VFR BLD AUTO: 24.3 %
HCT VFR BLD AUTO: 24.5 %
HCT VFR BLD AUTO: 24.8 %
HCT VFR BLD AUTO: 25.2 %
HCT VFR BLD AUTO: 25.3 %
HCT VFR BLD AUTO: 25.7 %
HCT VFR BLD AUTO: 25.9 %
HCT VFR BLD AUTO: 25.9 %
HCT VFR BLD AUTO: 26 %
HCT VFR BLD AUTO: 26.3 %
HCT VFR BLD AUTO: 26.5 %
HCT VFR BLD AUTO: 26.9 %
HCT VFR BLD AUTO: 27.2 %
HCT VFR BLD AUTO: 27.4 %
HCT VFR BLD AUTO: 28.1 %
HCT VFR BLD AUTO: 28.3 %
HCT VFR BLD AUTO: 28.5 %
HCT VFR BLD AUTO: 28.8 %
HCT VFR BLD AUTO: 29.3 %
HCT VFR BLD AUTO: 29.6 %
HCT VFR BLD AUTO: 29.7 %
HCT VFR BLD AUTO: 31 %
HCT VFR BLD CALC: 23 %PCV (ref 36–54)
HCT VFR BLD CALC: 27 %PCV (ref 36–54)
HCV AB SERPL QL IA: NEGATIVE
HGB BLD-MCNC: 6.4 G/DL
HGB BLD-MCNC: 6.5 G/DL
HGB BLD-MCNC: 6.7 G/DL
HGB BLD-MCNC: 6.8 G/DL
HGB BLD-MCNC: 7 G/DL
HGB BLD-MCNC: 7 G/DL
HGB BLD-MCNC: 7.1 G/DL
HGB BLD-MCNC: 7.1 G/DL
HGB BLD-MCNC: 7.4 G/DL
HGB BLD-MCNC: 7.4 G/DL
HGB BLD-MCNC: 7.5 G/DL
HGB BLD-MCNC: 7.9 G/DL
HGB BLD-MCNC: 8 G/DL
HGB BLD-MCNC: 8.2 G/DL
HGB BLD-MCNC: 8.2 G/DL
HGB BLD-MCNC: 8.3 G/DL
HGB BLD-MCNC: 8.3 G/DL
HGB BLD-MCNC: 8.5 G/DL
HGB BLD-MCNC: 8.6 G/DL
HGB BLD-MCNC: 8.7 G/DL
HGB BLD-MCNC: 9 G/DL
HGB BLD-MCNC: 9.1 G/DL
HGB UR QL STRIP: ABNORMAL
HGB UR QL STRIP: ABNORMAL
HSV1, PCR, CSF: NEGATIVE
HSV2, PCR, CSF: NEGATIVE
HUMAN BOCAVIRUS: NOT DETECTED
HUMAN CORONAVIRUS, COMMON COLD VIRUS: NOT DETECTED
HYALINE CASTS UR QL AUTO: 0 /LPF
HYALINE CASTS UR QL AUTO: 0 /LPF
HYPOCHROMIA BLD QL SMEAR: ABNORMAL
IMM GRANULOCYTES # BLD AUTO: 0 K/UL
IMM GRANULOCYTES # BLD AUTO: 0.01 K/UL
IMM GRANULOCYTES # BLD AUTO: 0.02 K/UL
IMM GRANULOCYTES # BLD AUTO: 0.03 K/UL
IMM GRANULOCYTES # BLD AUTO: 0.04 K/UL
IMM GRANULOCYTES # BLD AUTO: 0.05 K/UL
IMM GRANULOCYTES # BLD AUTO: 0.06 K/UL
IMM GRANULOCYTES # BLD AUTO: 0.07 K/UL
IMM GRANULOCYTES # BLD AUTO: 0.09 K/UL
IMM GRANULOCYTES NFR BLD AUTO: 0 %
IMM GRANULOCYTES NFR BLD AUTO: 0.2 %
IMM GRANULOCYTES NFR BLD AUTO: 0.3 %
IMM GRANULOCYTES NFR BLD AUTO: 0.4 %
IMM GRANULOCYTES NFR BLD AUTO: 0.5 %
IMM GRANULOCYTES NFR BLD AUTO: 0.6 %
IMM GRANULOCYTES NFR BLD AUTO: 0.7 %
IMM GRANULOCYTES NFR BLD AUTO: 1.4 %
INFLUENZA A - H1N1-09: NOT DETECTED
INTERVENTRICULAR SEPTUM: 0.69 CM (ref 0.6–1.1)
IRON SERPL-MCNC: 37 UG/DL
IRON SERPL-MCNC: 52 UG/DL
KETONES UR QL STRIP: ABNORMAL
KETONES UR QL STRIP: NEGATIVE
LA MAJOR: 4.68 CM
LA MINOR: 4.74 CM
LA WIDTH: 3.96 CM
LACTATE SERPL-SCNC: 0.5 MMOL/L
LACTATE SERPL-SCNC: 0.5 MMOL/L
LEFT ATRIUM SIZE: 3.53 CM
LEFT ATRIUM VOLUME INDEX: 36.6 ML/M2
LEFT ATRIUM VOLUME: 55.96 CM3
LEFT INTERNAL DIMENSION IN SYSTOLE: 3.46 CM (ref 2.1–4)
LEFT VENTRICLE DIASTOLIC VOLUME INDEX: 64.48 ML/M2
LEFT VENTRICLE DIASTOLIC VOLUME: 98.57 ML
LEFT VENTRICLE MASS INDEX: 64.9 G/M2
LEFT VENTRICLE SYSTOLIC VOLUME INDEX: 32.3 ML/M2
LEFT VENTRICLE SYSTOLIC VOLUME: 49.4 ML
LEFT VENTRICULAR INTERNAL DIMENSION IN DIASTOLE: 4.62 CM (ref 3.5–6)
LEFT VENTRICULAR MASS: 99.17 G
LEGIONELLA PNEUMOPHILA: NOT DETECTED
LEGIONELLA PNEUMOPHILA: NOT DETECTED
LEUKOCYTE ESTERASE UR QL STRIP: ABNORMAL
LEUKOCYTE ESTERASE UR QL STRIP: NEGATIVE
LV LATERAL E/E' RATIO: 5.46
LV SEPTAL E/E' RATIO: 6.45
LYMPHOCYTES # BLD AUTO: 0.5 K/UL
LYMPHOCYTES # BLD AUTO: 0.6 K/UL
LYMPHOCYTES # BLD AUTO: 0.7 K/UL
LYMPHOCYTES # BLD AUTO: 0.7 K/UL
LYMPHOCYTES # BLD AUTO: 0.8 K/UL
LYMPHOCYTES # BLD AUTO: 0.8 K/UL
LYMPHOCYTES # BLD AUTO: 0.9 K/UL
LYMPHOCYTES # BLD AUTO: 1 K/UL
LYMPHOCYTES # BLD AUTO: 1.1 K/UL
LYMPHOCYTES # BLD AUTO: 1.2 K/UL
LYMPHOCYTES # BLD AUTO: 1.4 K/UL
LYMPHOCYTES # BLD AUTO: 1.5 K/UL
LYMPHOCYTES # BLD AUTO: 1.6 K/UL
LYMPHOCYTES # BLD AUTO: 1.7 K/UL
LYMPHOCYTES # BLD AUTO: 1.9 K/UL
LYMPHOCYTES # BLD AUTO: 2 K/UL
LYMPHOCYTES # BLD AUTO: 2 K/UL
LYMPHOCYTES NFR BLD: 10.1 %
LYMPHOCYTES NFR BLD: 12.6 %
LYMPHOCYTES NFR BLD: 15.5 %
LYMPHOCYTES NFR BLD: 16.2 %
LYMPHOCYTES NFR BLD: 16.9 %
LYMPHOCYTES NFR BLD: 17.3 %
LYMPHOCYTES NFR BLD: 17.7 %
LYMPHOCYTES NFR BLD: 18.3 %
LYMPHOCYTES NFR BLD: 21.1 %
LYMPHOCYTES NFR BLD: 21.5 %
LYMPHOCYTES NFR BLD: 24.2 %
LYMPHOCYTES NFR BLD: 27.8 %
LYMPHOCYTES NFR BLD: 28.2 %
LYMPHOCYTES NFR BLD: 28.2 %
LYMPHOCYTES NFR BLD: 28.3 %
LYMPHOCYTES NFR BLD: 28.9 %
LYMPHOCYTES NFR BLD: 30.4 %
LYMPHOCYTES NFR BLD: 33.1 %
LYMPHOCYTES NFR BLD: 37.2 %
LYMPHOCYTES NFR BLD: 37.3 %
LYMPHOCYTES NFR BLD: 37.8 %
LYMPHOCYTES NFR BLD: 37.9 %
LYMPHOCYTES NFR BLD: 38.7 %
LYMPHOCYTES NFR BLD: 41.6 %
LYMPHOCYTES NFR BLD: 7.7 %
LYMPHOCYTES NFR BLD: 8 %
LYMPHOCYTES NFR BLD: 8 %
LYMPHOCYTES NFR CSF MANUAL: 22 %
LYMPHOCYTES NFR FLD MANUAL: 8 %
MAGNESIUM SERPL-MCNC: 1.4 MG/DL
MAGNESIUM SERPL-MCNC: 1.5 MG/DL
MAGNESIUM SERPL-MCNC: 1.6 MG/DL
MAGNESIUM SERPL-MCNC: 1.9 MG/DL
MAGNESIUM SERPL-MCNC: 2 MG/DL
MAGNESIUM SERPL-MCNC: 2.1 MG/DL
MAGNESIUM SERPL-MCNC: 2.2 MG/DL
MAGNESIUM SERPL-MCNC: 2.4 MG/DL
MAGNESIUM SERPL-MCNC: 2.4 MG/DL
MAGNESIUM SERPL-MCNC: 2.5 MG/DL
MAGNESIUM SERPL-MCNC: 2.6 MG/DL
MCH RBC QN AUTO: 23.6 PG
MCH RBC QN AUTO: 24.2 PG
MCH RBC QN AUTO: 24.6 PG
MCH RBC QN AUTO: 24.7 PG
MCH RBC QN AUTO: 24.8 PG
MCH RBC QN AUTO: 24.8 PG
MCH RBC QN AUTO: 25.1 PG
MCH RBC QN AUTO: 25.3 PG
MCH RBC QN AUTO: 25.9 PG
MCH RBC QN AUTO: 26.9 PG
MCH RBC QN AUTO: 27 PG
MCH RBC QN AUTO: 27 PG
MCH RBC QN AUTO: 27.1 PG
MCH RBC QN AUTO: 27.1 PG
MCH RBC QN AUTO: 27.2 PG
MCH RBC QN AUTO: 27.3 PG
MCH RBC QN AUTO: 27.3 PG
MCH RBC QN AUTO: 27.4 PG
MCH RBC QN AUTO: 27.8 PG
MCH RBC QN AUTO: 28 PG
MCH RBC QN AUTO: 28 PG
MCH RBC QN AUTO: 28.1 PG
MCH RBC QN AUTO: 28.3 PG
MCHC RBC AUTO-ENTMCNC: 27.9 G/DL
MCHC RBC AUTO-ENTMCNC: 28 G/DL
MCHC RBC AUTO-ENTMCNC: 28.5 G/DL
MCHC RBC AUTO-ENTMCNC: 28.6 G/DL
MCHC RBC AUTO-ENTMCNC: 28.7 G/DL
MCHC RBC AUTO-ENTMCNC: 28.8 G/DL
MCHC RBC AUTO-ENTMCNC: 28.8 G/DL
MCHC RBC AUTO-ENTMCNC: 29 G/DL
MCHC RBC AUTO-ENTMCNC: 29.1 G/DL
MCHC RBC AUTO-ENTMCNC: 29.2 G/DL
MCHC RBC AUTO-ENTMCNC: 29.4 G/DL
MCHC RBC AUTO-ENTMCNC: 29.6 G/DL
MCHC RBC AUTO-ENTMCNC: 29.7 G/DL
MCHC RBC AUTO-ENTMCNC: 29.8 G/DL
MCHC RBC AUTO-ENTMCNC: 29.8 G/DL
MCHC RBC AUTO-ENTMCNC: 30 G/DL
MCHC RBC AUTO-ENTMCNC: 30.4 G/DL
MCHC RBC AUTO-ENTMCNC: 30.5 G/DL
MCHC RBC AUTO-ENTMCNC: 30.5 G/DL
MCHC RBC AUTO-ENTMCNC: 30.6 G/DL
MCHC RBC AUTO-ENTMCNC: 31 G/DL
MCV RBC AUTO: 84 FL
MCV RBC AUTO: 84 FL
MCV RBC AUTO: 85 FL
MCV RBC AUTO: 86 FL
MCV RBC AUTO: 87 FL
MCV RBC AUTO: 90 FL
MCV RBC AUTO: 91 FL
MCV RBC AUTO: 93 FL
MCV RBC AUTO: 94 FL
MCV RBC AUTO: 95 FL
MCV RBC AUTO: 95 FL
MCV RBC AUTO: 96 FL
MCV RBC AUTO: 98 FL
METHADONE UR QL SCN>300 NG/ML: NEGATIVE
MICROSCOPIC COMMENT: ABNORMAL
MICROSCOPIC COMMENT: ABNORMAL
MODE: ABNORMAL
MONOCYTES # BLD AUTO: 0 K/UL
MONOCYTES # BLD AUTO: 0.2 K/UL
MONOCYTES # BLD AUTO: 0.3 K/UL
MONOCYTES # BLD AUTO: 0.4 K/UL
MONOCYTES # BLD AUTO: 0.5 K/UL
MONOCYTES # BLD AUTO: 0.6 K/UL
MONOCYTES # BLD AUTO: 0.7 K/UL
MONOCYTES NFR BLD: 0.5 %
MONOCYTES NFR BLD: 10.1 %
MONOCYTES NFR BLD: 10.4 %
MONOCYTES NFR BLD: 10.6 %
MONOCYTES NFR BLD: 11 %
MONOCYTES NFR BLD: 4.1 %
MONOCYTES NFR BLD: 4.7 %
MONOCYTES NFR BLD: 5 %
MONOCYTES NFR BLD: 6.1 %
MONOCYTES NFR BLD: 6.3 %
MONOCYTES NFR BLD: 6.7 %
MONOCYTES NFR BLD: 7 %
MONOCYTES NFR BLD: 7.2 %
MONOCYTES NFR BLD: 7.3 %
MONOCYTES NFR BLD: 7.5 %
MONOCYTES NFR BLD: 7.6 %
MONOCYTES NFR BLD: 7.7 %
MONOCYTES NFR BLD: 8.2 %
MONOCYTES NFR BLD: 8.2 %
MONOCYTES NFR BLD: 8.4 %
MONOCYTES NFR BLD: 8.5 %
MONOCYTES NFR BLD: 8.7 %
MONOCYTES NFR BLD: 8.8 %
MONOCYTES NFR BLD: 9.1 %
MONOCYTES NFR BLD: 9.9 %
MONOS+MACROS NFR CSF MANUAL: 13 %
MONOS+MACROS NFR FLD MANUAL: 18 %
MORAXELLA CATARRHALIS: NOT DETECTED
MORAXELLA CATARRHALIS: NOT DETECTED
MV PEAK A VEL: 0.47 M/S
MV PEAK E VEL: 0.71 M/S
MYCOBACTERIUM SPEC QL CULT: NORMAL
MYCOBACTERIUM SPEC QL CULT: NORMAL
NEUTROPHILS # BLD AUTO: 1.2 K/UL
NEUTROPHILS # BLD AUTO: 1.5 K/UL
NEUTROPHILS # BLD AUTO: 1.5 K/UL
NEUTROPHILS # BLD AUTO: 1.7 K/UL
NEUTROPHILS # BLD AUTO: 10 K/UL
NEUTROPHILS # BLD AUTO: 10.1 K/UL
NEUTROPHILS # BLD AUTO: 2.2 K/UL
NEUTROPHILS # BLD AUTO: 2.2 K/UL
NEUTROPHILS # BLD AUTO: 2.4 K/UL
NEUTROPHILS # BLD AUTO: 2.5 K/UL
NEUTROPHILS # BLD AUTO: 2.7 K/UL
NEUTROPHILS # BLD AUTO: 2.7 K/UL
NEUTROPHILS # BLD AUTO: 2.8 K/UL
NEUTROPHILS # BLD AUTO: 2.8 K/UL
NEUTROPHILS # BLD AUTO: 3.1 K/UL
NEUTROPHILS # BLD AUTO: 3.3 K/UL
NEUTROPHILS # BLD AUTO: 3.5 K/UL
NEUTROPHILS # BLD AUTO: 3.8 K/UL
NEUTROPHILS # BLD AUTO: 3.8 K/UL
NEUTROPHILS # BLD AUTO: 4.6 K/UL
NEUTROPHILS # BLD AUTO: 5.6 K/UL
NEUTROPHILS # BLD AUTO: 6.5 K/UL
NEUTROPHILS # BLD AUTO: 7.3 K/UL
NEUTROPHILS NFR BLD: 40.4 %
NEUTROPHILS NFR BLD: 43.8 %
NEUTROPHILS NFR BLD: 44.6 %
NEUTROPHILS NFR BLD: 44.7 %
NEUTROPHILS NFR BLD: 44.8 %
NEUTROPHILS NFR BLD: 44.9 %
NEUTROPHILS NFR BLD: 50.7 %
NEUTROPHILS NFR BLD: 55.3 %
NEUTROPHILS NFR BLD: 55.6 %
NEUTROPHILS NFR BLD: 55.6 %
NEUTROPHILS NFR BLD: 55.8 %
NEUTROPHILS NFR BLD: 56.4 %
NEUTROPHILS NFR BLD: 57.7 %
NEUTROPHILS NFR BLD: 58.2 %
NEUTROPHILS NFR BLD: 62.1 %
NEUTROPHILS NFR BLD: 65.6 %
NEUTROPHILS NFR BLD: 67.7 %
NEUTROPHILS NFR BLD: 69.2 %
NEUTROPHILS NFR BLD: 69.8 %
NEUTROPHILS NFR BLD: 72.6 %
NEUTROPHILS NFR BLD: 73 %
NEUTROPHILS NFR BLD: 76 %
NEUTROPHILS NFR BLD: 78.3 %
NEUTROPHILS NFR BLD: 83.8 %
NEUTROPHILS NFR BLD: 84.2 %
NEUTROPHILS NFR BLD: 86.8 %
NEUTROPHILS NFR BLD: 89.9 %
NEUTROPHILS NFR CSF MANUAL: 61 %
NEUTROPHILS NFR FLD MANUAL: 61 %
NITRITE UR QL STRIP: NEGATIVE
NITRITE UR QL STRIP: NEGATIVE
NRBC BLD-RTO: 0 /100 WBC
NUM UNITS TRANS PACKED RBC: NORMAL
NUM UNITS TRANS PACKED RBC: NORMAL
OPIATES UR QL SCN: NORMAL
OSMOLALITY SERPL: 320 MOSM/KG
OVALOCYTES BLD QL SMEAR: ABNORMAL
PARAINFLUENZA: NOT DETECTED
PCO2 BLDA: 48.6 MMHG (ref 35–45)
PCO2 BLDA: 50.1 MMHG (ref 35–45)
PCO2 BLDA: 52 MMHG (ref 35–45)
PCO2 BLDA: 56.6 MMHG (ref 35–45)
PCO2 BLDA: 59 MMHG (ref 35–45)
PCO2 BLDA: 70.6 MMHG (ref 35–45)
PCP UR QL SCN>25 NG/ML: NEGATIVE
PH SMN: 6.92 [PH] (ref 7.35–7.45)
PH SMN: 7.12 [PH] (ref 7.35–7.45)
PH SMN: 7.26 [PH] (ref 7.35–7.45)
PH SMN: 7.28 [PH] (ref 7.35–7.45)
PH SMN: 7.4 [PH] (ref 7.35–7.45)
PH SMN: 7.45 [PH] (ref 7.35–7.45)
PH UR STRIP: 5 [PH] (ref 5–8)
PH UR STRIP: 7 [PH] (ref 5–8)
PHOSPHATE SERPL-MCNC: 1.8 MG/DL
PHOSPHATE SERPL-MCNC: 2.5 MG/DL
PHOSPHATE SERPL-MCNC: 2.7 MG/DL
PHOSPHATE SERPL-MCNC: 2.7 MG/DL
PHOSPHATE SERPL-MCNC: 2.8 MG/DL
PHOSPHATE SERPL-MCNC: 3 MG/DL
PHOSPHATE SERPL-MCNC: 3.1 MG/DL
PHOSPHATE SERPL-MCNC: 3.3 MG/DL
PHOSPHATE SERPL-MCNC: 3.3 MG/DL
PHOSPHATE SERPL-MCNC: 3.5 MG/DL
PHOSPHATE SERPL-MCNC: 3.6 MG/DL
PHOSPHATE SERPL-MCNC: 3.6 MG/DL
PHOSPHATE SERPL-MCNC: 3.7 MG/DL
PHOSPHATE SERPL-MCNC: 3.8 MG/DL
PHOSPHATE SERPL-MCNC: 3.8 MG/DL
PHOSPHATE SERPL-MCNC: 4.6 MG/DL
PHOSPHATE SERPL-MCNC: 7.7 MG/DL
PISA TR MAX VEL: 2.4 M/S
PLATELET # BLD AUTO: 101 K/UL
PLATELET # BLD AUTO: 101 K/UL
PLATELET # BLD AUTO: 104 K/UL
PLATELET # BLD AUTO: 105 K/UL
PLATELET # BLD AUTO: 113 K/UL
PLATELET # BLD AUTO: 123 K/UL
PLATELET # BLD AUTO: 123 K/UL
PLATELET # BLD AUTO: 130 K/UL
PLATELET # BLD AUTO: 132 K/UL
PLATELET # BLD AUTO: 145 K/UL
PLATELET # BLD AUTO: 147 K/UL
PLATELET # BLD AUTO: 153 K/UL
PLATELET # BLD AUTO: 166 K/UL
PLATELET # BLD AUTO: 57 K/UL
PLATELET # BLD AUTO: 65 K/UL
PLATELET # BLD AUTO: 65 K/UL
PLATELET # BLD AUTO: 69 K/UL
PLATELET # BLD AUTO: 70 K/UL
PLATELET # BLD AUTO: 71 K/UL
PLATELET # BLD AUTO: 75 K/UL
PLATELET # BLD AUTO: 78 K/UL
PLATELET # BLD AUTO: 81 K/UL
PLATELET # BLD AUTO: 82 K/UL
PLATELET # BLD AUTO: 89 K/UL
PLATELET # BLD AUTO: 90 K/UL
PLATELET # BLD AUTO: 90 K/UL
PLATELET # BLD AUTO: 94 K/UL
PLATELET BLD QL SMEAR: ABNORMAL
PMV BLD AUTO: 10.1 FL
PMV BLD AUTO: 10.5 FL
PMV BLD AUTO: 10.5 FL
PMV BLD AUTO: 10.6 FL
PMV BLD AUTO: 10.9 FL
PMV BLD AUTO: 11 FL
PMV BLD AUTO: 11 FL
PMV BLD AUTO: 11.1 FL
PMV BLD AUTO: 11.2 FL
PMV BLD AUTO: 11.3 FL
PMV BLD AUTO: 11.5 FL
PMV BLD AUTO: 11.6 FL
PMV BLD AUTO: 11.8 FL
PMV BLD AUTO: 12 FL
PMV BLD AUTO: 12 FL
PMV BLD AUTO: 12.2 FL
PMV BLD AUTO: 12.4 FL
PMV BLD AUTO: 12.6 FL
PMV BLD AUTO: 12.7 FL
PMV BLD AUTO: 12.9 FL
PMV BLD AUTO: 13 FL
PMV BLD AUTO: 13 FL
PMV BLD AUTO: 13.3 FL
PO2 BLDA: 135 MMHG (ref 80–100)
PO2 BLDA: 144 MMHG (ref 80–100)
PO2 BLDA: 33 MMHG (ref 40–60)
PO2 BLDA: 35 MMHG (ref 40–60)
PO2 BLDA: 37 MMHG (ref 40–60)
PO2 BLDA: 65 MMHG (ref 40–60)
POC BE: -12 MMOL/L
POC BE: -20 MMOL/L
POC BE: -5 MMOL/L
POC BE: 10 MMOL/L
POC BE: 11 MMOL/L
POC BE: 7 MMOL/L
POC IONIZED CALCIUM: 1.1 MMOL/L (ref 1.06–1.42)
POC IONIZED CALCIUM: 1.17 MMOL/L (ref 1.06–1.42)
POC SATURATED O2: 61 % (ref 95–100)
POC SATURATED O2: 65 % (ref 95–100)
POC SATURATED O2: 65 % (ref 95–100)
POC SATURATED O2: 75 % (ref 95–100)
POC SATURATED O2: 98 % (ref 95–100)
POC SATURATED O2: 99 % (ref 95–100)
POC TCO2: 14 MMOL/L (ref 24–29)
POC TCO2: 19 MMOL/L (ref 23–27)
POC TCO2: 23 MMOL/L (ref 23–27)
POC TCO2: 36 MMOL/L (ref 24–29)
POC TCO2: 36 MMOL/L (ref 24–29)
POC TCO2: 37 MMOL/L (ref 24–29)
POCT GLUCOSE: 109 MG/DL (ref 70–110)
POCT GLUCOSE: 112 MG/DL (ref 70–110)
POCT GLUCOSE: 112 MG/DL (ref 70–110)
POCT GLUCOSE: 116 MG/DL (ref 70–110)
POCT GLUCOSE: 118 MG/DL (ref 70–110)
POCT GLUCOSE: 131 MG/DL (ref 70–110)
POCT GLUCOSE: 141 MG/DL (ref 70–110)
POCT GLUCOSE: 142 MG/DL (ref 70–110)
POCT GLUCOSE: 146 MG/DL (ref 70–110)
POCT GLUCOSE: 146 MG/DL (ref 70–110)
POCT GLUCOSE: 151 MG/DL (ref 70–110)
POCT GLUCOSE: 155 MG/DL (ref 70–110)
POCT GLUCOSE: 156 MG/DL (ref 70–110)
POCT GLUCOSE: 158 MG/DL (ref 70–110)
POCT GLUCOSE: 166 MG/DL (ref 70–110)
POCT GLUCOSE: 173 MG/DL (ref 70–110)
POCT GLUCOSE: 175 MG/DL (ref 70–110)
POCT GLUCOSE: 185 MG/DL (ref 70–110)
POCT GLUCOSE: 225 MG/DL (ref 70–110)
POCT GLUCOSE: 235 MG/DL (ref 70–110)
POCT GLUCOSE: 244 MG/DL (ref 70–110)
POCT GLUCOSE: 300 MG/DL (ref 70–110)
POCT GLUCOSE: 318 MG/DL (ref 70–110)
POCT GLUCOSE: 352 MG/DL (ref 70–110)
POCT GLUCOSE: 369 MG/DL (ref 70–110)
POCT GLUCOSE: 399 MG/DL (ref 70–110)
POCT GLUCOSE: 85 MG/DL (ref 70–110)
POCT GLUCOSE: 89 MG/DL (ref 70–110)
POCT GLUCOSE: 93 MG/DL (ref 70–110)
POCT GLUCOSE: 98 MG/DL (ref 70–110)
POIKILOCYTOSIS BLD QL SMEAR: SLIGHT
POLYCHROMASIA BLD QL SMEAR: ABNORMAL
POTASSIUM BLD-SCNC: 4.5 MMOL/L (ref 3.5–5.1)
POTASSIUM BLD-SCNC: 5.1 MMOL/L (ref 3.5–5.1)
POTASSIUM SERPL-SCNC: 3.1 MMOL/L
POTASSIUM SERPL-SCNC: 3.2 MMOL/L
POTASSIUM SERPL-SCNC: 3.2 MMOL/L
POTASSIUM SERPL-SCNC: 3.3 MMOL/L
POTASSIUM SERPL-SCNC: 3.3 MMOL/L
POTASSIUM SERPL-SCNC: 3.4 MMOL/L
POTASSIUM SERPL-SCNC: 3.4 MMOL/L
POTASSIUM SERPL-SCNC: 3.5 MMOL/L
POTASSIUM SERPL-SCNC: 3.6 MMOL/L
POTASSIUM SERPL-SCNC: 3.8 MMOL/L
POTASSIUM SERPL-SCNC: 3.9 MMOL/L
POTASSIUM SERPL-SCNC: 3.9 MMOL/L
POTASSIUM SERPL-SCNC: 4 MMOL/L
POTASSIUM SERPL-SCNC: 4.1 MMOL/L
POTASSIUM SERPL-SCNC: 4.3 MMOL/L
POTASSIUM SERPL-SCNC: 4.4 MMOL/L
POTASSIUM SERPL-SCNC: 4.4 MMOL/L
POTASSIUM SERPL-SCNC: 4.5 MMOL/L
POTASSIUM SERPL-SCNC: 4.6 MMOL/L
POTASSIUM SERPL-SCNC: 4.6 MMOL/L
POTASSIUM SERPL-SCNC: 4.7 MMOL/L
POTASSIUM SERPL-SCNC: 4.8 MMOL/L
POTASSIUM SERPL-SCNC: 5.3 MMOL/L
POTASSIUM SERPL-SCNC: 6.5 MMOL/L
PRE FEV1 FVC: 40
PRE FEV1 FVC: 42
PRE FEV1: 0.78
PRE FEV1: 0.9
PRE FVC: 1.85
PRE FVC: 2.23
PREDICTED FEV1 FVC: 88
PREDICTED FEV1 FVC: 88
PREDICTED FEV1: 2.8
PREDICTED FEV1: 2.8
PREDICTED FVC: 3.22
PREDICTED FVC: 3.22
PROCALCITONIN SERPL IA-MCNC: 0.06 NG/ML
PROCALCITONIN SERPL IA-MCNC: 0.06 NG/ML
PROCALCITONIN SERPL IA-MCNC: 0.29 NG/ML
PROT CSF-MCNC: 30 MG/DL
PROT SERPL-MCNC: 5 G/DL
PROT SERPL-MCNC: 5.1 G/DL
PROT SERPL-MCNC: 5.7 G/DL
PROT SERPL-MCNC: 5.8 G/DL
PROT SERPL-MCNC: 5.9 G/DL
PROT SERPL-MCNC: 5.9 G/DL
PROT SERPL-MCNC: 6 G/DL
PROT SERPL-MCNC: 6.3 G/DL
PROT SERPL-MCNC: 6.5 G/DL
PROT SERPL-MCNC: 6.5 G/DL
PROT SERPL-MCNC: 6.6 G/DL
PROT SERPL-MCNC: 6.6 G/DL
PROT SERPL-MCNC: 6.7 G/DL
PROT SERPL-MCNC: 6.9 G/DL
PROT SERPL-MCNC: 6.9 G/DL
PROT SERPL-MCNC: 7 G/DL
PROT SERPL-MCNC: 7.1 G/DL
PROT SERPL-MCNC: 7.1 G/DL
PROT SERPL-MCNC: 7.2 G/DL
PROT SERPL-MCNC: 7.4 G/DL
PROT UR QL STRIP: ABNORMAL
PROT UR QL STRIP: ABNORMAL
PROT UR-MCNC: 51 MG/DL
PROT/CREAT UR: 0.51 MG/G{CREAT}
PROVIDER CREDENTIALS: ABNORMAL
PROVIDER CREDENTIALS: ABNORMAL
PROVIDER NOTIFIED: ABNORMAL
PROVIDER NOTIFIED: ABNORMAL
RA MAJOR: 4.43 CM
RA PRESSURE: 3 MMHG
RA WIDTH: 3.31 CM
RBC # BLD AUTO: 2.37 M/UL
RBC # BLD AUTO: 2.39 M/UL
RBC # BLD AUTO: 2.56 M/UL
RBC # BLD AUTO: 2.61 M/UL
RBC # BLD AUTO: 2.66 M/UL
RBC # BLD AUTO: 2.68 M/UL
RBC # BLD AUTO: 2.75 M/UL
RBC # BLD AUTO: 2.76 M/UL
RBC # BLD AUTO: 2.77 M/UL
RBC # BLD AUTO: 2.79 M/UL
RBC # BLD AUTO: 2.79 M/UL
RBC # BLD AUTO: 2.82 M/UL
RBC # BLD AUTO: 2.89 M/UL
RBC # BLD AUTO: 2.9 M/UL
RBC # BLD AUTO: 2.92 M/UL
RBC # BLD AUTO: 2.93 M/UL
RBC # BLD AUTO: 2.99 M/UL
RBC # BLD AUTO: 3 M/UL
RBC # BLD AUTO: 3.01 M/UL
RBC # BLD AUTO: 3.03 M/UL
RBC # BLD AUTO: 3.08 M/UL
RBC # BLD AUTO: 3.08 M/UL
RBC # BLD AUTO: 3.19 M/UL
RBC # BLD AUTO: 3.22 M/UL
RBC # BLD AUTO: 3.44 M/UL
RBC # BLD AUTO: 3.52 M/UL
RBC # BLD AUTO: 3.58 M/UL
RBC # CSF: 7000 /CU MM
RBC #/AREA URNS AUTO: 1 /HPF (ref 0–4)
RBC #/AREA URNS AUTO: 6 /HPF (ref 0–4)
RETICS/RBC NFR AUTO: 2.5 %
RIGHT VENTRICULAR END-DIASTOLIC DIMENSION: 3.36 CM
RVP - ADENOVIRUS: NOT DETECTED
RVP - HUMAN METAPNEUMOVIRUS (HMPV): NOT DETECTED
RVP - INFLUENZA A: NOT DETECTED
RVP - INFLUENZA B: NOT DETECTED
RVP - RESPIRATORY SYNCTIAL VIRUS (RSV) A: NOT DETECTED
RVP - RESPIRATORY VIRAL PANEL, SOURCE: ABNORMAL
RVP - RESPIRATORY VIRAL PANEL, SOURCE: ABNORMAL
RVP - RESPIRATORY VIRAL PANEL, SOURCE: NORMAL
RVP - RESPIRATORY VIRAL PANEL, SOURCE: NORMAL
RVP - RHINOVIRUS: NOT DETECTED
SALICYLATES SERPL-MCNC: <5 MG/DL
SAMPLE: ABNORMAL
SATURATED IRON: 10 %
SATURATED IRON: 15 %
SERUM COLLECTION DT - LUMINEX CLASS I: NORMAL
SERUM COLLECTION DT - LUMINEX CLASS II: NORMAL
SINUS: 2.73 CM
SITE: ABNORMAL
SODIUM BLD-SCNC: 139 MMOL/L (ref 136–145)
SODIUM BLD-SCNC: 140 MMOL/L (ref 136–145)
SODIUM SERPL-SCNC: 135 MMOL/L
SODIUM SERPL-SCNC: 137 MMOL/L
SODIUM SERPL-SCNC: 138 MMOL/L
SODIUM SERPL-SCNC: 139 MMOL/L
SODIUM SERPL-SCNC: 140 MMOL/L
SODIUM SERPL-SCNC: 141 MMOL/L
SODIUM SERPL-SCNC: 142 MMOL/L
SODIUM SERPL-SCNC: 143 MMOL/L
SODIUM SERPL-SCNC: 144 MMOL/L
SODIUM SERPL-SCNC: 145 MMOL/L
SP GR UR STRIP: 1.01 (ref 1–1.03)
SP GR UR STRIP: 1.01 (ref 1–1.03)
SP02: 100
SP02: 96
SP02: 99
SPECIMEN SOURCE: NORMAL
SPECIMEN VOL CSF: 1 ML
SQUAMOUS #/AREA URNS AUTO: 2 /HPF
STJ: 2.38 CM
TACROLIMUS BLD-MCNC: 1.9 NG/ML
TACROLIMUS BLD-MCNC: 2.3 NG/ML
TACROLIMUS BLD-MCNC: 2.7 NG/ML
TACROLIMUS BLD-MCNC: 2.8 NG/ML
TACROLIMUS BLD-MCNC: 3.2 NG/ML
TACROLIMUS BLD-MCNC: 4 NG/ML
TACROLIMUS BLD-MCNC: 4.6 NG/ML
TACROLIMUS BLD-MCNC: 4.6 NG/ML
TACROLIMUS BLD-MCNC: 4.9 NG/ML
TACROLIMUS BLD-MCNC: 5.1 NG/ML
TACROLIMUS BLD-MCNC: 5.3 NG/ML
TACROLIMUS BLD-MCNC: 5.3 NG/ML
TACROLIMUS BLD-MCNC: 5.5 NG/ML
TACROLIMUS BLD-MCNC: 6.3 NG/ML
TACROLIMUS BLD-MCNC: 6.3 NG/ML
TACROLIMUS BLD-MCNC: 6.6 NG/ML
TACROLIMUS BLD-MCNC: 6.6 NG/ML
TACROLIMUS BLD-MCNC: 6.7 NG/ML
TACROLIMUS BLD-MCNC: 8.3 NG/ML
TACROLIMUS BLD-MCNC: 8.3 NG/ML
TACROLIMUS BLD-MCNC: <1.5 NG/ML
TACROLIMUS BLD-MCNC: <1.5 NG/ML
TDI LATERAL: 0.13
TDI SEPTAL: 0.11
TDI: 0.12
TEM - ACINETOBACTER BAUMANNII: NOT DETECTED
TEM - ACINETOBACTER BAUMANNII: NOT DETECTED
TEM - BORDETELLA PERTUSSIS: NOT DETECTED
TEM - BORDETELLA PERTUSSIS: NOT DETECTED
TEM - CHLAMYDOPHILA PNEUMONIAE: NOT DETECTED
TEM - CHLAMYDOPHILA PNEUMONIAE: NOT DETECTED
TEM - KLEBSIELLA PNEUMONIAE: NOT DETECTED
TEM - KLEBSIELLA PNEUMONIAE: NOT DETECTED
TEM - MRSA: NOT DETECTED
TEM - MRSA: POSITIVE
TEM - MYCOPLASMA PNEUMONIAE: NOT DETECTED
TEM - MYCOPLASMA PNEUMONIAE: NOT DETECTED
TEM - NEISSERIA MENINGITIDIS: NOT DETECTED
TEM - NEISSERIA MENINGITIDIS: NOT DETECTED
TEM - PANTON-VALENTINE: NOT DETECTED
TEM - PANTON-VALENTINE: NOT DETECTED
TEM - PSEUDOMONAS AERUGINOSA: NOT DETECTED
TEM - PSEUDOMONAS AERUGINOSA: POSITIVE
TEM - STAPHYLOCOCCUS AUREUS: NOT DETECTED
TEM - STAPHYLOCOCCUS AUREUS: NOT DETECTED
TEM - STREPTOCOCCUS PNEUMONIAE: NOT DETECTED
TEM - STREPTOCOCCUS PNEUMONIAE: NOT DETECTED
TEM - STREPTOCOCCUS PYOGENES A: NOT DETECTED
TEM - STREPTOCOCCUS PYOGENES A: NOT DETECTED
TEM- HAEMOPHILUS INFLUENZAE B: NOT DETECTED
TEM- HAEMOPHILUS INFLUENZAE B: NOT DETECTED
TEM- HAEMOPHILUS INFLUENZAE: NOT DETECTED
TEM- HAEMOPHILUS INFLUENZAE: NOT DETECTED
TIME NOTIFIED: 1408
TIME NOTIFIED: 245
TOTAL IRON BINDING CAPACITY: 354 UG/DL
TOTAL IRON BINDING CAPACITY: 376 UG/DL
TOXICOLOGY INFORMATION: NORMAL
TR MAX PG: 23.04 MMHG
TRANSFERRIN SERPL-MCNC: 239 MG/DL
TRANSFERRIN SERPL-MCNC: 239 MG/DL
TRANSFERRIN SERPL-MCNC: 254 MG/DL
TRANSFERRIN SERPL-MCNC: 254 MG/DL
TRICUSPID ANNULAR PLANE SYSTOLIC EXCURSION: 1.44 CM
TROPONIN I SERPL DL<=0.01 NG/ML-MCNC: <0.006 NG/ML
TV REST PULMONARY ARTERY PRESSURE: 26.04 MMHG
URN SPEC COLLECT METH UR: ABNORMAL
URN SPEC COLLECT METH UR: ABNORMAL
VANCOMYCIN SERPL-MCNC: 13.7 UG/ML
VANCOMYCIN SERPL-MCNC: 21.5 UG/ML
VANCOMYCIN SERPL-MCNC: 22.5 UG/ML
VANCOMYCIN SERPL-MCNC: 27.8 UG/ML
VANCOMYCIN SERPL-MCNC: 28.8 UG/ML
VANCOMYCIN SERPL-MCNC: 28.9 UG/ML
VANCOMYCIN SERPL-MCNC: 28.9 UG/ML
VANCOMYCIN TROUGH SERPL-MCNC: 20.8 UG/ML
VANCOMYCIN TROUGH SERPL-MCNC: 30.5 UG/ML
VARICELLA ZOSTER BY PCR RESULT: NEGATIVE
VERBAL RESULT READBACK PERFORMED: YES
VERBAL RESULT READBACK PERFORMED: YES
VIT B12 SERPL-MCNC: 572 PG/ML
VIT B12 SERPL-MCNC: 656 PG/ML
WBC # BLD AUTO: 11.51 K/UL
WBC # BLD AUTO: 12 K/UL
WBC # BLD AUTO: 2.63 K/UL
WBC # BLD AUTO: 2.74 K/UL
WBC # BLD AUTO: 3.65 K/UL
WBC # BLD AUTO: 3.88 K/UL
WBC # BLD AUTO: 3.93 K/UL
WBC # BLD AUTO: 4.06 K/UL
WBC # BLD AUTO: 4.12 K/UL
WBC # BLD AUTO: 4.23 K/UL
WBC # BLD AUTO: 4.62 K/UL
WBC # BLD AUTO: 4.72 K/UL
WBC # BLD AUTO: 4.88 K/UL
WBC # BLD AUTO: 4.91 K/UL
WBC # BLD AUTO: 4.93 K/UL
WBC # BLD AUTO: 4.93 K/UL
WBC # BLD AUTO: 5.01 K/UL
WBC # BLD AUTO: 5.02 K/UL
WBC # BLD AUTO: 5.34 K/UL
WBC # BLD AUTO: 5.39 K/UL
WBC # BLD AUTO: 5.43 K/UL
WBC # BLD AUTO: 5.5 K/UL
WBC # BLD AUTO: 5.58 K/UL
WBC # BLD AUTO: 6.22 K/UL
WBC # BLD AUTO: 6.26 K/UL
WBC # BLD AUTO: 8.68 K/UL
WBC # BLD AUTO: 8.86 K/UL
WBC # CSF: 17 /CU MM
WBC # FLD: 163 /CU MM
WBC #/AREA URNS AUTO: 22 /HPF (ref 0–5)
WBC #/AREA URNS AUTO: 22 /HPF (ref 0–5)
WNV RNA CSF QL NAA+PROBE: NEGATIVE

## 2018-01-01 PROCEDURE — 88312 SPECIAL STAINS GROUP 1: CPT | Mod: 26,,, | Performed by: PATHOLOGY

## 2018-01-01 PROCEDURE — 25000003 PHARM REV CODE 250

## 2018-01-01 PROCEDURE — 87641 MR-STAPH DNA AMP PROBE: CPT

## 2018-01-01 PROCEDURE — 0B918ZZ DRAINAGE OF TRACHEA, VIA NATURAL OR ARTIFICIAL OPENING ENDOSCOPIC: ICD-10-PCS | Performed by: INTERNAL MEDICINE

## 2018-01-01 PROCEDURE — 63600175 PHARM REV CODE 636 W HCPCS: Performed by: PHYSICIAN ASSISTANT

## 2018-01-01 PROCEDURE — 25000242 PHARM REV CODE 250 ALT 637 W/ HCPCS: Performed by: INTERNAL MEDICINE

## 2018-01-01 PROCEDURE — 86832 HLA CLASS I HIGH DEFIN QUAL: CPT | Mod: 91,TXP

## 2018-01-01 PROCEDURE — 63600175 PHARM REV CODE 636 W HCPCS: Performed by: HOSPITALIST

## 2018-01-01 PROCEDURE — 36430 TRANSFUSION BLD/BLD COMPNT: CPT

## 2018-01-01 PROCEDURE — 25000242 PHARM REV CODE 250 ALT 637 W/ HCPCS: Performed by: PHYSICIAN ASSISTANT

## 2018-01-01 PROCEDURE — 36415 COLL VENOUS BLD VENIPUNCTURE: CPT

## 2018-01-01 PROCEDURE — 20600001 HC STEP DOWN PRIVATE ROOM

## 2018-01-01 PROCEDURE — 63600175 PHARM REV CODE 636 W HCPCS: Performed by: STUDENT IN AN ORGANIZED HEALTH CARE EDUCATION/TRAINING PROGRAM

## 2018-01-01 PROCEDURE — 80053 COMPREHEN METABOLIC PANEL: CPT

## 2018-01-01 PROCEDURE — 83735 ASSAY OF MAGNESIUM: CPT

## 2018-01-01 PROCEDURE — 87116 MYCOBACTERIA CULTURE: CPT

## 2018-01-01 PROCEDURE — 0B968ZZ DRAINAGE OF RIGHT LOWER LOBE BRONCHUS, VIA NATURAL OR ARTIFICIAL OPENING ENDOSCOPIC: ICD-10-PCS | Performed by: INTERNAL MEDICINE

## 2018-01-01 PROCEDURE — 27000221 HC OXYGEN, UP TO 24 HOURS

## 2018-01-01 PROCEDURE — 25000003 PHARM REV CODE 250: Performed by: PHYSICIAN ASSISTANT

## 2018-01-01 PROCEDURE — 99233 SBSQ HOSP IP/OBS HIGH 50: CPT | Mod: ,,, | Performed by: INTERNAL MEDICINE

## 2018-01-01 PROCEDURE — 80069 RENAL FUNCTION PANEL: CPT

## 2018-01-01 PROCEDURE — 80202 ASSAY OF VANCOMYCIN: CPT

## 2018-01-01 PROCEDURE — 94761 N-INVAS EAR/PLS OXIMETRY MLT: CPT

## 2018-01-01 PROCEDURE — 25000003 PHARM REV CODE 250: Performed by: INTERNAL MEDICINE

## 2018-01-01 PROCEDURE — 25000242 PHARM REV CODE 250 ALT 637 W/ HCPCS

## 2018-01-01 PROCEDURE — 83540 ASSAY OF IRON: CPT

## 2018-01-01 PROCEDURE — 99233 SBSQ HOSP IP/OBS HIGH 50: CPT | Mod: ,,, | Performed by: PHYSICIAN ASSISTANT

## 2018-01-01 PROCEDURE — 84100 ASSAY OF PHOSPHORUS: CPT

## 2018-01-01 PROCEDURE — 85025 COMPLETE CBC W/AUTO DIFF WBC: CPT

## 2018-01-01 PROCEDURE — 82803 BLOOD GASES ANY COMBINATION: CPT

## 2018-01-01 PROCEDURE — 99232 SBSQ HOSP IP/OBS MODERATE 35: CPT | Mod: ,,, | Performed by: INTERNAL MEDICINE

## 2018-01-01 PROCEDURE — 63600175 PHARM REV CODE 636 W HCPCS: Performed by: INTERNAL MEDICINE

## 2018-01-01 PROCEDURE — 80074 ACUTE HEPATITIS PANEL: CPT

## 2018-01-01 PROCEDURE — 82330 ASSAY OF CALCIUM: CPT

## 2018-01-01 PROCEDURE — 37000009 HC ANESTHESIA EA ADD 15 MINS: Performed by: INTERNAL MEDICINE

## 2018-01-01 PROCEDURE — 63600175 PHARM REV CODE 636 W HCPCS

## 2018-01-01 PROCEDURE — 63600175 PHARM REV CODE 636 W HCPCS: Performed by: NURSE PRACTITIONER

## 2018-01-01 PROCEDURE — 37000008 HC ANESTHESIA 1ST 15 MINUTES: Performed by: INTERNAL MEDICINE

## 2018-01-01 PROCEDURE — 87449 NOS EACH ORGANISM AG IA: CPT

## 2018-01-01 PROCEDURE — 80197 ASSAY OF TACROLIMUS: CPT

## 2018-01-01 PROCEDURE — 94799 UNLISTED PULMONARY SVC/PX: CPT

## 2018-01-01 PROCEDURE — 94640 AIRWAY INHALATION TREATMENT: CPT

## 2018-01-01 PROCEDURE — 0B998ZZ DRAINAGE OF LINGULA BRONCHUS, VIA NATURAL OR ARTIFICIAL OPENING ENDOSCOPIC: ICD-10-PCS | Performed by: INTERNAL MEDICINE

## 2018-01-01 PROCEDURE — 93010 ELECTROCARDIOGRAM REPORT: CPT | Mod: ,,, | Performed by: INTERNAL MEDICINE

## 2018-01-01 PROCEDURE — 86833 HLA CLASS II HIGH DEFIN QUAL: CPT | Mod: TXP

## 2018-01-01 PROCEDURE — 94668 MNPJ CHEST WALL SBSQ: CPT

## 2018-01-01 PROCEDURE — 99900035 HC TECH TIME PER 15 MIN (STAT)

## 2018-01-01 PROCEDURE — 90945 DIALYSIS ONE EVALUATION: CPT

## 2018-01-01 PROCEDURE — 36000707: Performed by: INTERNAL MEDICINE

## 2018-01-01 PROCEDURE — P9016 RBC LEUKOCYTES REDUCED: HCPCS

## 2018-01-01 PROCEDURE — 86644 CMV ANTIBODY: CPT

## 2018-01-01 PROCEDURE — 84484 ASSAY OF TROPONIN QUANT: CPT

## 2018-01-01 PROCEDURE — 31579 LARYNGOSCOPY TELESCOPIC: CPT | Mod: S$PBB,,, | Performed by: OTOLARYNGOLOGY

## 2018-01-01 PROCEDURE — 31628 BRONCHOSCOPY/LUNG BX EACH: CPT | Mod: RT,,, | Performed by: INTERNAL MEDICINE

## 2018-01-01 PROCEDURE — C9113 INJ PANTOPRAZOLE SODIUM, VIA: HCPCS | Performed by: PHYSICIAN ASSISTANT

## 2018-01-01 PROCEDURE — A0435 FIXED WING AIR MILEAGE: HCPCS | Mod: QN

## 2018-01-01 PROCEDURE — 71046 X-RAY EXAM CHEST 2 VIEWS: CPT | Mod: TC

## 2018-01-01 PROCEDURE — 87015 SPECIMEN INFECT AGNT CONCNTJ: CPT

## 2018-01-01 PROCEDURE — S0028 INJECTION, FAMOTIDINE, 20 MG: HCPCS | Performed by: PHYSICIAN ASSISTANT

## 2018-01-01 PROCEDURE — 82945 GLUCOSE OTHER FLUID: CPT

## 2018-01-01 PROCEDURE — 99232 SBSQ HOSP IP/OBS MODERATE 35: CPT | Mod: ,,, | Performed by: NURSE PRACTITIONER

## 2018-01-01 PROCEDURE — 94664 DEMO&/EVAL PT USE INHALER: CPT

## 2018-01-01 PROCEDURE — 82330 ASSAY OF CALCIUM: CPT | Mod: 91

## 2018-01-01 PROCEDURE — 84132 ASSAY OF SERUM POTASSIUM: CPT

## 2018-01-01 PROCEDURE — 85045 AUTOMATED RETICULOCYTE COUNT: CPT

## 2018-01-01 PROCEDURE — 25000003 PHARM REV CODE 250: Performed by: NURSE PRACTITIONER

## 2018-01-01 PROCEDURE — 99232 SBSQ HOSP IP/OBS MODERATE 35: CPT | Mod: 25,,, | Performed by: INTERNAL MEDICINE

## 2018-01-01 PROCEDURE — 25000003 PHARM REV CODE 250: Performed by: STUDENT IN AN ORGANIZED HEALTH CARE EDUCATION/TRAINING PROGRAM

## 2018-01-01 PROCEDURE — 99223 1ST HOSP IP/OBS HIGH 75: CPT | Mod: AI,,, | Performed by: INTERNAL MEDICINE

## 2018-01-01 PROCEDURE — 87305 ASPERGILLUS AG IA: CPT

## 2018-01-01 PROCEDURE — 96374 THER/PROPH/DIAG INJ IV PUSH: CPT

## 2018-01-01 PROCEDURE — 81001 URINALYSIS AUTO W/SCOPE: CPT

## 2018-01-01 PROCEDURE — 87632 RESP VIRUS 6-11 TARGETS: CPT

## 2018-01-01 PROCEDURE — 25000242 PHARM REV CODE 250 ALT 637 W/ HCPCS: Performed by: HOSPITALIST

## 2018-01-01 PROCEDURE — 87070 CULTURE OTHR SPECIMN AEROBIC: CPT

## 2018-01-01 PROCEDURE — D9220A PRA ANESTHESIA: Mod: ,,, | Performed by: ANESTHESIOLOGY

## 2018-01-01 PROCEDURE — 88313 SPECIAL STAINS GROUP 2: CPT | Mod: 26,,, | Performed by: PATHOLOGY

## 2018-01-01 PROCEDURE — 85014 HEMATOCRIT: CPT

## 2018-01-01 PROCEDURE — 99213 OFFICE O/P EST LOW 20 MIN: CPT | Mod: PBBFAC,25 | Performed by: INTERNAL MEDICINE

## 2018-01-01 PROCEDURE — 31622 DX BRONCHOSCOPE/WASH: CPT | Mod: ,,, | Performed by: INTERNAL MEDICINE

## 2018-01-01 PROCEDURE — 99232 SBSQ HOSP IP/OBS MODERATE 35: CPT | Mod: ,,, | Performed by: PHYSICIAN ASSISTANT

## 2018-01-01 PROCEDURE — 25000003 PHARM REV CODE 250: Performed by: HOSPITALIST

## 2018-01-01 PROCEDURE — 93005 ELECTROCARDIOGRAM TRACING: CPT

## 2018-01-01 PROCEDURE — 86850 RBC ANTIBODY SCREEN: CPT

## 2018-01-01 PROCEDURE — 94010 BREATHING CAPACITY TEST: CPT | Mod: 26,,, | Performed by: INTERNAL MEDICINE

## 2018-01-01 PROCEDURE — 82607 VITAMIN B-12: CPT

## 2018-01-01 PROCEDURE — 36000706: Performed by: INTERNAL MEDICINE

## 2018-01-01 PROCEDURE — 99999 PR PBB SHADOW E&M-EST. PATIENT-LVL III: CPT | Mod: PBBFAC,,, | Performed by: INTERNAL MEDICINE

## 2018-01-01 PROCEDURE — 84157 ASSAY OF PROTEIN OTHER: CPT

## 2018-01-01 PROCEDURE — 84156 ASSAY OF PROTEIN URINE: CPT

## 2018-01-01 PROCEDURE — 87106 FUNGI IDENTIFICATION YEAST: CPT | Mod: 59

## 2018-01-01 PROCEDURE — 80069 RENAL FUNCTION PANEL: CPT | Mod: 91

## 2018-01-01 PROCEDURE — 71000033 HC RECOVERY, INTIAL HOUR: Performed by: INTERNAL MEDICINE

## 2018-01-01 PROCEDURE — 27000646 HC AEROBIKA DEVICE

## 2018-01-01 PROCEDURE — 20000000 HC ICU ROOM

## 2018-01-01 PROCEDURE — 87205 SMEAR GRAM STAIN: CPT

## 2018-01-01 PROCEDURE — 86977 RBC SERUM PRETX INCUBJ/INHIB: CPT | Mod: TXP

## 2018-01-01 PROCEDURE — 96375 TX/PRO/DX INJ NEW DRUG ADDON: CPT

## 2018-01-01 PROCEDURE — 82746 ASSAY OF FOLIC ACID SERUM: CPT

## 2018-01-01 PROCEDURE — 80307 DRUG TEST PRSMV CHEM ANLYZR: CPT

## 2018-01-01 PROCEDURE — 31579 LARYNGOSCOPY TELESCOPIC: CPT | Mod: PBBFAC | Performed by: OTOLARYNGOLOGY

## 2018-01-01 PROCEDURE — 89051 BODY FLUID CELL COUNT: CPT

## 2018-01-01 PROCEDURE — 94010 BREATHING CAPACITY TEST: CPT | Mod: PBBFAC | Performed by: INTERNAL MEDICINE

## 2018-01-01 PROCEDURE — 87040 BLOOD CULTURE FOR BACTERIA: CPT | Mod: 59

## 2018-01-01 PROCEDURE — 06HM33Z INSERTION OF INFUSION DEVICE INTO RIGHT FEMORAL VEIN, PERCUTANEOUS APPROACH: ICD-10-PCS | Performed by: INTERNAL MEDICINE

## 2018-01-01 PROCEDURE — 86403 PARTICLE AGGLUT ANTBDY SCRN: CPT

## 2018-01-01 PROCEDURE — 99238 HOSP IP/OBS DSCHRG MGMT 30/<: CPT | Mod: ,,, | Performed by: NURSE PRACTITIONER

## 2018-01-01 PROCEDURE — 87186 SC STD MICRODIL/AGAR DIL: CPT

## 2018-01-01 PROCEDURE — 12000002 HC ACUTE/MED SURGE SEMI-PRIVATE ROOM

## 2018-01-01 PROCEDURE — 99283 EMERGENCY DEPT VISIT LOW MDM: CPT | Mod: ,,, | Performed by: PHYSICIAN ASSISTANT

## 2018-01-01 PROCEDURE — 82728 ASSAY OF FERRITIN: CPT

## 2018-01-01 PROCEDURE — 87103 BLOOD FUNGUS CULTURE: CPT

## 2018-01-01 PROCEDURE — 87077 CULTURE AEROBIC IDENTIFY: CPT

## 2018-01-01 PROCEDURE — 36600 WITHDRAWAL OF ARTERIAL BLOOD: CPT

## 2018-01-01 PROCEDURE — 94010 BREATHING CAPACITY TEST: CPT | Mod: 26,S$PBB,, | Performed by: INTERNAL MEDICINE

## 2018-01-01 PROCEDURE — 71046 X-RAY EXAM CHEST 2 VIEWS: CPT | Mod: 26,,, | Performed by: RADIOLOGY

## 2018-01-01 PROCEDURE — 88313 SPECIAL STAINS GROUP 2: CPT | Performed by: PATHOLOGY

## 2018-01-01 PROCEDURE — 0B988ZZ DRAINAGE OF LEFT UPPER LOBE BRONCHUS, VIA NATURAL OR ARTIFICIAL OPENING ENDOSCOPIC: ICD-10-PCS | Performed by: INTERNAL MEDICINE

## 2018-01-01 PROCEDURE — 87798 DETECT AGENT NOS DNA AMP: CPT | Mod: 91

## 2018-01-01 PROCEDURE — 99233 SBSQ HOSP IP/OBS HIGH 50: CPT | Mod: ,,, | Performed by: NURSE PRACTITIONER

## 2018-01-01 PROCEDURE — 80076 HEPATIC FUNCTION PANEL: CPT

## 2018-01-01 PROCEDURE — 88305 TISSUE EXAM BY PATHOLOGIST: CPT | Mod: 26,,, | Performed by: PATHOLOGY

## 2018-01-01 PROCEDURE — 80048 BASIC METABOLIC PNL TOTAL CA: CPT | Mod: 91

## 2018-01-01 PROCEDURE — 87529 HSV DNA AMP PROBE: CPT

## 2018-01-01 PROCEDURE — 99238 HOSP IP/OBS DSCHRG MGMT 30/<: CPT | Mod: ,,, | Performed by: PHYSICIAN ASSISTANT

## 2018-01-01 PROCEDURE — 99214 OFFICE O/P EST MOD 30 MIN: CPT | Mod: 25,S$PBB,, | Performed by: INTERNAL MEDICINE

## 2018-01-01 PROCEDURE — 0B9D7ZX DRAINAGE OF RIGHT MIDDLE LUNG LOBE, VIA NATURAL OR ARTIFICIAL OPENING, DIAGNOSTIC: ICD-10-PCS | Performed by: INTERNAL MEDICINE

## 2018-01-01 PROCEDURE — 0B978ZZ DRAINAGE OF LEFT MAIN BRONCHUS, VIA NATURAL OR ARTIFICIAL OPENING ENDOSCOPIC: ICD-10-PCS | Performed by: INTERNAL MEDICINE

## 2018-01-01 PROCEDURE — 84295 ASSAY OF SERUM SODIUM: CPT

## 2018-01-01 PROCEDURE — 83010 ASSAY OF HAPTOGLOBIN QUANT: CPT

## 2018-01-01 PROCEDURE — 0B948ZZ DRAINAGE OF RIGHT UPPER LOBE BRONCHUS, VIA NATURAL OR ARTIFICIAL OPENING ENDOSCOPIC: ICD-10-PCS | Performed by: INTERNAL MEDICINE

## 2018-01-01 PROCEDURE — A0430 FIXED WING AIR TRANSPORT: HCPCS | Mod: QN

## 2018-01-01 PROCEDURE — 83880 ASSAY OF NATRIURETIC PEPTIDE: CPT

## 2018-01-01 PROCEDURE — 88305 TISSUE EXAM BY PATHOLOGIST: CPT | Performed by: PATHOLOGY

## 2018-01-01 PROCEDURE — 84145 PROCALCITONIN (PCT): CPT

## 2018-01-01 PROCEDURE — 99223 1ST HOSP IP/OBS HIGH 75: CPT | Mod: ,,, | Performed by: INTERNAL MEDICINE

## 2018-01-01 PROCEDURE — 87015 SPECIMEN INFECT AGNT CONCNTJ: CPT | Mod: 59

## 2018-01-01 PROCEDURE — 83735 ASSAY OF MAGNESIUM: CPT | Mod: 91

## 2018-01-01 PROCEDURE — 25000242 PHARM REV CODE 250 ALT 637 W/ HCPCS: Performed by: NURSE PRACTITIONER

## 2018-01-01 PROCEDURE — 86920 COMPATIBILITY TEST SPIN: CPT

## 2018-01-01 PROCEDURE — 0B9B8ZZ DRAINAGE OF LEFT LOWER LOBE BRONCHUS, VIA NATURAL OR ARTIFICIAL OPENING ENDOSCOPIC: ICD-10-PCS | Performed by: INTERNAL MEDICINE

## 2018-01-01 PROCEDURE — 99285 EMERGENCY DEPT VISIT HI MDM: CPT | Mod: 25

## 2018-01-01 PROCEDURE — 87205 SMEAR GRAM STAIN: CPT | Mod: 59

## 2018-01-01 PROCEDURE — 80048 BASIC METABOLIC PNL TOTAL CA: CPT

## 2018-01-01 PROCEDURE — 87486 CHLMYD PNEUM DNA AMP PROBE: CPT

## 2018-01-01 PROCEDURE — 0B938ZZ DRAINAGE OF RIGHT MAIN BRONCHUS, VIA NATURAL OR ARTIFICIAL OPENING ENDOSCOPIC: ICD-10-PCS | Performed by: INTERNAL MEDICINE

## 2018-01-01 PROCEDURE — 87102 FUNGUS ISOLATION CULTURE: CPT

## 2018-01-01 PROCEDURE — 80100008 HC CRRT DAILY MAINTENANCE

## 2018-01-01 PROCEDURE — 99213 OFFICE O/P EST LOW 20 MIN: CPT | Mod: 25,S$PBB,, | Performed by: OTOLARYNGOLOGY

## 2018-01-01 PROCEDURE — 87206 SMEAR FLUORESCENT/ACID STAI: CPT

## 2018-01-01 PROCEDURE — 87798 DETECT AGENT NOS DNA AMP: CPT

## 2018-01-01 PROCEDURE — 99213 OFFICE O/P EST LOW 20 MIN: CPT | Mod: PBBFAC,25 | Performed by: OTOLARYNGOLOGY

## 2018-01-01 PROCEDURE — 85025 COMPLETE CBC W/AUTO DIFF WBC: CPT | Mod: 91

## 2018-01-01 PROCEDURE — 83605 ASSAY OF LACTIC ACID: CPT

## 2018-01-01 PROCEDURE — 0B9M8ZX DRAINAGE OF BILATERAL LUNGS, VIA NATURAL OR ARTIFICIAL OPENING ENDOSCOPIC, DIAGNOSTIC: ICD-10-PCS | Performed by: INTERNAL MEDICINE

## 2018-01-01 PROCEDURE — 81025 URINE PREGNANCY TEST: CPT

## 2018-01-01 PROCEDURE — 71000039 HC RECOVERY, EACH ADD'L HOUR: Performed by: INTERNAL MEDICINE

## 2018-01-01 PROCEDURE — 0BBF8ZX EXCISION OF RIGHT LOWER LUNG LOBE, VIA NATURAL OR ARTIFICIAL OPENING ENDOSCOPIC, DIAGNOSTIC: ICD-10-PCS | Performed by: INTERNAL MEDICINE

## 2018-01-01 PROCEDURE — 87070 CULTURE OTHR SPECIMN AEROBIC: CPT | Mod: 59

## 2018-01-01 PROCEDURE — 99999 PR PBB SHADOW E&M-EST. PATIENT-LVL III: CPT | Mod: PBBFAC,,, | Performed by: OTOLARYNGOLOGY

## 2018-01-01 PROCEDURE — 63600175 PHARM REV CODE 636 W HCPCS: Mod: JG | Performed by: INTERNAL MEDICINE

## 2018-01-01 PROCEDURE — 009U3ZX DRAINAGE OF SPINAL CANAL, PERCUTANEOUS APPROACH, DIAGNOSTIC: ICD-10-PCS | Performed by: RADIOLOGY

## 2018-01-01 PROCEDURE — 87086 URINE CULTURE/COLONY COUNT: CPT

## 2018-01-01 PROCEDURE — 31624 DX BRONCHOSCOPE/LAVAGE: CPT | Mod: 59,RT,, | Performed by: INTERNAL MEDICINE

## 2018-01-01 PROCEDURE — 90945 DIALYSIS ONE EVALUATION: CPT | Mod: ,,, | Performed by: NURSE PRACTITIONER

## 2018-01-01 PROCEDURE — 87206 SMEAR FLUORESCENT/ACID STAI: CPT | Mod: 91

## 2018-01-01 PROCEDURE — 83930 ASSAY OF BLOOD OSMOLALITY: CPT

## 2018-01-01 PROCEDURE — 0B958ZZ DRAINAGE OF RIGHT MIDDLE LOBE BRONCHUS, VIA NATURAL OR ARTIFICIAL OPENING ENDOSCOPIC: ICD-10-PCS | Performed by: INTERNAL MEDICINE

## 2018-01-01 RX ORDER — TACROLIMUS 0.5 MG/1
0.5 CAPSULE ORAL DAILY
Qty: 30 CAPSULE | Refills: 11 | Status: SHIPPED | OUTPATIENT
Start: 2018-01-01 | End: 2019-01-01

## 2018-01-01 RX ORDER — HYDROCODONE BITARTRATE AND ACETAMINOPHEN 500; 5 MG/1; MG/1
TABLET ORAL
Status: DISCONTINUED | OUTPATIENT
Start: 2018-01-01 | End: 2018-01-01

## 2018-01-01 RX ORDER — DIPHENHYDRAMINE HYDROCHLORIDE 50 MG/ML
25 INJECTION INTRAMUSCULAR; INTRAVENOUS ONCE
Status: COMPLETED | OUTPATIENT
Start: 2018-01-01 | End: 2018-01-01

## 2018-01-01 RX ORDER — DAPSONE 100 MG/1
100 TABLET ORAL DAILY
Status: DISCONTINUED | OUTPATIENT
Start: 2018-01-01 | End: 2018-01-01 | Stop reason: HOSPADM

## 2018-01-01 RX ORDER — PREDNISONE 5 MG/1
5 TABLET ORAL DAILY
Status: DISCONTINUED | OUTPATIENT
Start: 2018-01-01 | End: 2018-01-01 | Stop reason: HOSPADM

## 2018-01-01 RX ORDER — LORAZEPAM 2 MG/ML
0.25 INJECTION INTRAMUSCULAR ONCE AS NEEDED
Status: DISCONTINUED | OUTPATIENT
Start: 2018-01-01 | End: 2018-01-01 | Stop reason: HOSPADM

## 2018-01-01 RX ORDER — LORAZEPAM 1 MG/1
1 TABLET ORAL EVERY 6 HOURS PRN
Status: DISCONTINUED | OUTPATIENT
Start: 2018-01-01 | End: 2018-01-01 | Stop reason: HOSPADM

## 2018-01-01 RX ORDER — PROPOFOL 10 MG/ML
VIAL (ML) INTRAVENOUS CONTINUOUS PRN
Status: DISCONTINUED | OUTPATIENT
Start: 2018-01-01 | End: 2018-01-01

## 2018-01-01 RX ORDER — AMLODIPINE BESYLATE 5 MG/1
10 TABLET ORAL DAILY
Start: 2018-01-01 | End: 2018-01-01 | Stop reason: ALTCHOICE

## 2018-01-01 RX ORDER — NALOXONE HYDROCHLORIDE 1 MG/ML
0.4 INJECTION INTRAMUSCULAR; INTRAVENOUS; SUBCUTANEOUS
Status: DISCONTINUED | OUTPATIENT
Start: 2018-01-01 | End: 2018-01-01

## 2018-01-01 RX ORDER — TACROLIMUS 0.5 MG/1
0.5 CAPSULE ORAL 2 TIMES DAILY
Status: DISCONTINUED | OUTPATIENT
Start: 2018-01-01 | End: 2018-01-01 | Stop reason: HOSPADM

## 2018-01-01 RX ORDER — PANTOPRAZOLE SODIUM 40 MG/1
40 TABLET, DELAYED RELEASE ORAL DAILY
Status: DISCONTINUED | OUTPATIENT
Start: 2018-01-01 | End: 2018-01-01 | Stop reason: HOSPADM

## 2018-01-01 RX ORDER — POTASSIUM CHLORIDE 20 MEQ/15ML
40 SOLUTION ORAL
Status: DISCONTINUED | OUTPATIENT
Start: 2018-01-01 | End: 2018-01-01 | Stop reason: HOSPADM

## 2018-01-01 RX ORDER — CETIRIZINE HYDROCHLORIDE 10 MG/1
10 TABLET ORAL NIGHTLY
Status: DISCONTINUED | OUTPATIENT
Start: 2018-01-01 | End: 2018-01-01

## 2018-01-01 RX ORDER — FOLIC ACID 1 MG/1
1000 TABLET ORAL DAILY
Status: DISCONTINUED | OUTPATIENT
Start: 2018-01-01 | End: 2018-01-01

## 2018-01-01 RX ORDER — TOPIRAMATE 25 MG/1
25 TABLET ORAL 2 TIMES DAILY
Status: DISCONTINUED | OUTPATIENT
Start: 2018-01-01 | End: 2018-01-01 | Stop reason: HOSPADM

## 2018-01-01 RX ORDER — NALOXONE HCL 0.4 MG/ML
0.4 VIAL (ML) INJECTION
Status: DISCONTINUED | OUTPATIENT
Start: 2018-01-01 | End: 2018-01-01 | Stop reason: HOSPADM

## 2018-01-01 RX ORDER — POTASSIUM CHLORIDE 20 MEQ/15ML
60 SOLUTION ORAL
Status: DISCONTINUED | OUTPATIENT
Start: 2018-01-01 | End: 2018-01-01 | Stop reason: HOSPADM

## 2018-01-01 RX ORDER — IBUPROFEN 200 MG
16 TABLET ORAL
Status: DISCONTINUED | OUTPATIENT
Start: 2018-01-01 | End: 2018-01-01 | Stop reason: HOSPADM

## 2018-01-01 RX ORDER — DIPHENHYDRAMINE HYDROCHLORIDE 50 MG/ML
INJECTION INTRAMUSCULAR; INTRAVENOUS
Status: DISPENSED
Start: 2018-01-01 | End: 2018-01-01

## 2018-01-01 RX ORDER — PROMETHAZINE HYDROCHLORIDE 25 MG/ML
25 INJECTION, SOLUTION INTRAMUSCULAR; INTRAVENOUS EVERY 6 HOURS PRN
Status: DISCONTINUED | OUTPATIENT
Start: 2018-01-01 | End: 2018-01-01

## 2018-01-01 RX ORDER — VENLAFAXINE HYDROCHLORIDE 37.5 MG/1
150 CAPSULE, EXTENDED RELEASE ORAL NIGHTLY
Status: DISCONTINUED | OUTPATIENT
Start: 2018-01-01 | End: 2018-01-01

## 2018-01-01 RX ORDER — DIPHENHYDRAMINE HCL 25 MG
25 CAPSULE ORAL EVERY 6 HOURS PRN
Status: DISCONTINUED | OUTPATIENT
Start: 2018-01-01 | End: 2018-01-01 | Stop reason: HOSPADM

## 2018-01-01 RX ORDER — ENOXAPARIN SODIUM 100 MG/ML
30 INJECTION SUBCUTANEOUS EVERY 24 HOURS
Status: DISCONTINUED | OUTPATIENT
Start: 2018-01-01 | End: 2018-01-01 | Stop reason: HOSPADM

## 2018-01-01 RX ORDER — OSELTAMIVIR PHOSPHATE 75 MG/1
75 CAPSULE ORAL 2 TIMES DAILY
Status: DISCONTINUED | OUTPATIENT
Start: 2018-01-01 | End: 2018-01-01

## 2018-01-01 RX ORDER — LEVALBUTEROL INHALATION SOLUTION 1.25 MG/3ML
1 SOLUTION RESPIRATORY (INHALATION) EVERY 8 HOURS PRN
Qty: 75 ML | Refills: 5 | Status: SHIPPED | OUTPATIENT
Start: 2018-01-01 | End: 2018-01-01 | Stop reason: SDUPTHER

## 2018-01-01 RX ORDER — ONDANSETRON HYDROCHLORIDE 8 MG/1
8 TABLET, FILM COATED ORAL EVERY 12 HOURS PRN
Qty: 30 TABLET | Refills: 2 | Status: ON HOLD | OUTPATIENT
Start: 2018-01-01 | End: 2019-01-01 | Stop reason: SDUPTHER

## 2018-01-01 RX ORDER — KETAMINE HCL IN 0.9 % NACL 50 MG/5 ML
SYRINGE (ML) INTRAVENOUS
Status: DISCONTINUED | OUTPATIENT
Start: 2018-01-01 | End: 2018-01-01

## 2018-01-01 RX ORDER — TACROLIMUS 1 MG/1
2 CAPSULE ORAL EVERY MORNING
Status: DISCONTINUED | OUTPATIENT
Start: 2018-01-01 | End: 2018-01-01

## 2018-01-01 RX ORDER — ACETAMINOPHEN 10 MG/ML
1000 INJECTION, SOLUTION INTRAVENOUS
Status: COMPLETED | OUTPATIENT
Start: 2018-01-01 | End: 2018-01-01

## 2018-01-01 RX ORDER — DIPHENHYDRAMINE HYDROCHLORIDE 50 MG/ML
50 INJECTION INTRAMUSCULAR; INTRAVENOUS EVERY 12 HOURS
Status: DISCONTINUED | OUTPATIENT
Start: 2018-01-01 | End: 2018-01-01

## 2018-01-01 RX ORDER — LEVALBUTEROL 1.25 MG/.5ML
1.25 SOLUTION, CONCENTRATE RESPIRATORY (INHALATION) EVERY 6 HOURS
Status: DISCONTINUED | OUTPATIENT
Start: 2018-01-01 | End: 2018-01-01

## 2018-01-01 RX ORDER — MYCOPHENOLATE MOFETIL 250 MG/1
500 CAPSULE ORAL 2 TIMES DAILY
Status: DISCONTINUED | OUTPATIENT
Start: 2018-01-01 | End: 2018-01-01 | Stop reason: HOSPADM

## 2018-01-01 RX ORDER — GUAIFENESIN 600 MG/1
600 TABLET, EXTENDED RELEASE ORAL 2 TIMES DAILY
Qty: 60 TABLET | Refills: 11 | Status: SHIPPED | OUTPATIENT
Start: 2018-01-01 | End: 2019-01-01

## 2018-01-01 RX ORDER — POLYETHYLENE GLYCOL 3350 17 G/17G
17 POWDER, FOR SOLUTION ORAL 2 TIMES DAILY PRN
Status: DISCONTINUED | OUTPATIENT
Start: 2018-01-01 | End: 2018-01-01 | Stop reason: HOSPADM

## 2018-01-01 RX ORDER — LORAZEPAM 1 MG/1
2 TABLET ORAL EVERY 6 HOURS PRN
Status: DISCONTINUED | OUTPATIENT
Start: 2018-01-01 | End: 2018-01-01 | Stop reason: HOSPADM

## 2018-01-01 RX ORDER — ACETAMINOPHEN 500 MG
1000 TABLET ORAL EVERY 6 HOURS PRN
Status: DISCONTINUED | OUTPATIENT
Start: 2018-01-01 | End: 2018-01-01 | Stop reason: HOSPADM

## 2018-01-01 RX ORDER — SODIUM CHLORIDE 9 MG/ML
INJECTION, SOLUTION INTRAVENOUS CONTINUOUS PRN
Status: DISCONTINUED | OUTPATIENT
Start: 2018-01-01 | End: 2018-01-01

## 2018-01-01 RX ORDER — IBUPROFEN 200 MG
24 TABLET ORAL
Status: DISCONTINUED | OUTPATIENT
Start: 2018-01-01 | End: 2018-01-01 | Stop reason: HOSPADM

## 2018-01-01 RX ORDER — ONDANSETRON 2 MG/ML
INJECTION INTRAMUSCULAR; INTRAVENOUS
Status: COMPLETED
Start: 2018-01-01 | End: 2018-01-01

## 2018-01-01 RX ORDER — HEPARIN SODIUM,PORCINE 10 UNIT/ML
10 VIAL (ML) INTRAVENOUS
Status: DISCONTINUED | OUTPATIENT
Start: 2018-01-01 | End: 2018-01-01

## 2018-01-01 RX ORDER — LEVALBUTEROL INHALATION SOLUTION 1.25 MG/3ML
1 SOLUTION RESPIRATORY (INHALATION) EVERY 8 HOURS PRN
Qty: 75 ML | Refills: 5 | Status: ON HOLD | OUTPATIENT
Start: 2018-01-01 | End: 2018-01-01 | Stop reason: SDUPTHER

## 2018-01-01 RX ORDER — BUTALBITAL, ACETAMINOPHEN AND CAFFEINE 50; 325; 40 MG/1; MG/1; MG/1
1 TABLET ORAL ONCE
Status: COMPLETED | OUTPATIENT
Start: 2018-01-01 | End: 2018-01-01

## 2018-01-01 RX ORDER — ONDANSETRON 2 MG/ML
8 INJECTION INTRAMUSCULAR; INTRAVENOUS ONCE
Status: COMPLETED | OUTPATIENT
Start: 2018-01-01 | End: 2018-01-01

## 2018-01-01 RX ORDER — DIPHENHYDRAMINE HYDROCHLORIDE 50 MG/ML
50 INJECTION INTRAMUSCULAR; INTRAVENOUS EVERY 6 HOURS PRN
Status: DISCONTINUED | OUTPATIENT
Start: 2018-01-01 | End: 2018-01-01 | Stop reason: HOSPADM

## 2018-01-01 RX ORDER — NALOXONE HCL 0.4 MG/ML
VIAL (ML) INJECTION
Status: DISPENSED
Start: 2018-01-01 | End: 2018-01-01

## 2018-01-01 RX ORDER — TIZANIDINE 2 MG/1
8 TABLET ORAL EVERY 6 HOURS PRN
Status: DISCONTINUED | OUTPATIENT
Start: 2018-01-01 | End: 2018-01-01

## 2018-01-01 RX ORDER — TIZANIDINE 4 MG/1
8 TABLET ORAL EVERY 6 HOURS PRN
Status: DISCONTINUED | OUTPATIENT
Start: 2018-01-01 | End: 2018-01-01 | Stop reason: HOSPADM

## 2018-01-01 RX ORDER — DIPHENHYDRAMINE HYDROCHLORIDE 50 MG/ML
50 INJECTION INTRAMUSCULAR; INTRAVENOUS NIGHTLY
Status: DISCONTINUED | OUTPATIENT
Start: 2018-01-01 | End: 2018-01-01

## 2018-01-01 RX ORDER — DIPHENHYDRAMINE HYDROCHLORIDE 50 MG/ML
50 INJECTION INTRAMUSCULAR; INTRAVENOUS ONCE
Status: DISCONTINUED | OUTPATIENT
Start: 2018-01-01 | End: 2018-01-01

## 2018-01-01 RX ORDER — FERROUS SULFATE, DRIED 160(50) MG
1 TABLET, EXTENDED RELEASE ORAL 2 TIMES DAILY WITH MEALS
Status: DISCONTINUED | OUTPATIENT
Start: 2018-01-01 | End: 2018-01-01 | Stop reason: HOSPADM

## 2018-01-01 RX ORDER — LEVALBUTEROL INHALATION SOLUTION 0.63 MG/3ML
0.63 SOLUTION RESPIRATORY (INHALATION)
Status: COMPLETED | OUTPATIENT
Start: 2018-01-01 | End: 2018-01-01

## 2018-01-01 RX ORDER — HEPARIN 100 UNIT/ML
5 SYRINGE INTRAVENOUS
Status: DISCONTINUED | OUTPATIENT
Start: 2018-01-01 | End: 2018-01-01 | Stop reason: HOSPADM

## 2018-01-01 RX ORDER — TACROLIMUS 1 MG/1
3 CAPSULE ORAL EVERY 12 HOURS
Qty: 180 CAPSULE | Refills: 11 | Status: ON HOLD | OUTPATIENT
Start: 2018-01-01 | End: 2018-01-01 | Stop reason: SDUPTHER

## 2018-01-01 RX ORDER — LEVALBUTEROL 1.25 MG/.5ML
1.25 SOLUTION, CONCENTRATE RESPIRATORY (INHALATION) EVERY 6 HOURS
Status: DISCONTINUED | OUTPATIENT
Start: 2018-01-01 | End: 2018-01-01 | Stop reason: HOSPADM

## 2018-01-01 RX ORDER — PANTOPRAZOLE SODIUM 40 MG/10ML
40 INJECTION, POWDER, LYOPHILIZED, FOR SOLUTION INTRAVENOUS EVERY 24 HOURS
Status: DISCONTINUED | OUTPATIENT
Start: 2018-01-01 | End: 2018-01-01

## 2018-01-01 RX ORDER — OXYCODONE HYDROCHLORIDE 10 MG/1
10 TABLET ORAL EVERY 4 HOURS PRN
Status: DISCONTINUED | OUTPATIENT
Start: 2018-01-01 | End: 2018-01-01 | Stop reason: HOSPADM

## 2018-01-01 RX ORDER — FLUTICASONE PROPIONATE 50 MCG
SPRAY, SUSPENSION (ML) NASAL
Qty: 16 G | Refills: 5 | Status: SHIPPED | OUTPATIENT
Start: 2018-01-01

## 2018-01-01 RX ORDER — HEPARIN SODIUM 5000 [USP'U]/ML
5000 INJECTION, SOLUTION INTRAVENOUS; SUBCUTANEOUS EVERY 8 HOURS
Status: DISCONTINUED | OUTPATIENT
Start: 2018-01-01 | End: 2018-01-01

## 2018-01-01 RX ORDER — MIDAZOLAM HYDROCHLORIDE 1 MG/ML
INJECTION, SOLUTION INTRAMUSCULAR; INTRAVENOUS
Status: DISCONTINUED | OUTPATIENT
Start: 2018-01-01 | End: 2018-01-01

## 2018-01-01 RX ORDER — LEVALBUTEROL INHALATION SOLUTION 0.63 MG/3ML
0.63 SOLUTION RESPIRATORY (INHALATION)
Status: DISCONTINUED | OUTPATIENT
Start: 2018-01-01 | End: 2018-01-01 | Stop reason: HOSPADM

## 2018-01-01 RX ORDER — CETIRIZINE HYDROCHLORIDE 10 MG/1
10 TABLET ORAL DAILY
Status: DISCONTINUED | OUTPATIENT
Start: 2018-01-01 | End: 2018-01-01

## 2018-01-01 RX ORDER — TACROLIMUS 1 MG/1
3 CAPSULE ORAL 2 TIMES DAILY
Status: DISCONTINUED | OUTPATIENT
Start: 2018-01-01 | End: 2018-01-01

## 2018-01-01 RX ORDER — ARIPIPRAZOLE 2 MG/1
2 TABLET ORAL DAILY
Status: DISCONTINUED | OUTPATIENT
Start: 2018-01-01 | End: 2018-01-01 | Stop reason: HOSPADM

## 2018-01-01 RX ORDER — ENOXAPARIN SODIUM 100 MG/ML
40 INJECTION SUBCUTANEOUS EVERY 24 HOURS
Status: DISCONTINUED | OUTPATIENT
Start: 2018-01-01 | End: 2018-01-01 | Stop reason: HOSPADM

## 2018-01-01 RX ORDER — ONDANSETRON HYDROCHLORIDE 8 MG/1
TABLET, FILM COATED ORAL
Qty: 30 TABLET | Refills: 0 | Status: ON HOLD | OUTPATIENT
Start: 2018-01-01 | End: 2018-01-01 | Stop reason: SDUPTHER

## 2018-01-01 RX ORDER — GABAPENTIN 300 MG/1
300 CAPSULE ORAL 3 TIMES DAILY
Status: DISCONTINUED | OUTPATIENT
Start: 2018-01-01 | End: 2018-01-01

## 2018-01-01 RX ORDER — HYDROCODONE BITARTRATE AND ACETAMINOPHEN 500; 5 MG/1; MG/1
TABLET ORAL
Status: DISCONTINUED | OUTPATIENT
Start: 2018-01-01 | End: 2018-01-01 | Stop reason: HOSPADM

## 2018-01-01 RX ORDER — METOPROLOL TARTRATE 25 MG/1
25 TABLET, FILM COATED ORAL 2 TIMES DAILY
Status: DISCONTINUED | OUTPATIENT
Start: 2018-01-01 | End: 2018-01-01 | Stop reason: HOSPADM

## 2018-01-01 RX ORDER — DIPHENHYDRAMINE HCL 50 MG
50 CAPSULE ORAL EVERY 6 HOURS PRN
Status: DISCONTINUED | OUTPATIENT
Start: 2018-01-01 | End: 2018-01-01 | Stop reason: HOSPADM

## 2018-01-01 RX ORDER — MORPHINE SULFATE 15 MG/1
15 TABLET, FILM COATED, EXTENDED RELEASE ORAL 2 TIMES DAILY
Status: DISCONTINUED | OUTPATIENT
Start: 2018-01-01 | End: 2018-01-01 | Stop reason: HOSPADM

## 2018-01-01 RX ORDER — VENLAFAXINE HYDROCHLORIDE 37.5 MG/1
37.5 CAPSULE, EXTENDED RELEASE ORAL NIGHTLY
Status: DISCONTINUED | OUTPATIENT
Start: 2018-01-01 | End: 2018-01-01 | Stop reason: HOSPADM

## 2018-01-01 RX ORDER — DIPHENHYDRAMINE HYDROCHLORIDE 50 MG/ML
50 INJECTION INTRAMUSCULAR; INTRAVENOUS EVERY 6 HOURS PRN
Status: DISCONTINUED | OUTPATIENT
Start: 2018-01-01 | End: 2018-01-01

## 2018-01-01 RX ORDER — TIZANIDINE 4 MG/1
TABLET ORAL
Qty: 90 TABLET | Refills: 0 | Status: SHIPPED | OUTPATIENT
Start: 2018-01-01 | End: 2019-01-01 | Stop reason: SDUPTHER

## 2018-01-01 RX ORDER — FERROUS SULFATE, DRIED 160(50) MG
1 TABLET, EXTENDED RELEASE ORAL 2 TIMES DAILY WITH MEALS
Qty: 60 TABLET | Refills: 11 | Status: SHIPPED | OUTPATIENT
Start: 2018-01-01

## 2018-01-01 RX ORDER — POTASSIUM CHLORIDE 20 MEQ/1
20 TABLET, EXTENDED RELEASE ORAL ONCE
Status: DISCONTINUED | OUTPATIENT
Start: 2018-01-01 | End: 2018-01-01 | Stop reason: HOSPADM

## 2018-01-01 RX ORDER — MINERAL OIL
180 ENEMA (ML) RECTAL DAILY
Status: DISCONTINUED | OUTPATIENT
Start: 2018-01-01 | End: 2018-01-01 | Stop reason: HOSPADM

## 2018-01-01 RX ORDER — MAGNESIUM SULFATE HEPTAHYDRATE 40 MG/ML
2 INJECTION, SOLUTION INTRAVENOUS
Status: DISCONTINUED | OUTPATIENT
Start: 2018-01-01 | End: 2018-01-01 | Stop reason: HOSPADM

## 2018-01-01 RX ORDER — OXYCODONE HYDROCHLORIDE 5 MG/1
5 TABLET ORAL EVERY 4 HOURS PRN
Status: DISCONTINUED | OUTPATIENT
Start: 2018-01-01 | End: 2018-01-01

## 2018-01-01 RX ORDER — GUAIFENESIN 600 MG/1
600 TABLET, EXTENDED RELEASE ORAL 2 TIMES DAILY
Status: DISCONTINUED | OUTPATIENT
Start: 2018-01-01 | End: 2018-01-01 | Stop reason: HOSPADM

## 2018-01-01 RX ORDER — HEPARIN 100 UNIT/ML
100 SYRINGE INTRAVENOUS
Status: DISCONTINUED | OUTPATIENT
Start: 2018-01-01 | End: 2018-01-01 | Stop reason: HOSPADM

## 2018-01-01 RX ORDER — TACROLIMUS 1 MG/1
3 CAPSULE ORAL EVERY 12 HOURS
Qty: 180 CAPSULE | Refills: 11 | Status: SHIPPED | OUTPATIENT
Start: 2018-01-01 | End: 2018-01-01

## 2018-01-01 RX ORDER — AMLODIPINE BESYLATE 10 MG/1
10 TABLET ORAL DAILY
Qty: 30 TABLET | Refills: 11 | Status: ON HOLD | OUTPATIENT
Start: 2018-01-01 | End: 2019-01-01 | Stop reason: HOSPADM

## 2018-01-01 RX ORDER — TACROLIMUS 1 MG/1
2 CAPSULE ORAL EVERY 12 HOURS
Qty: 90 CAPSULE | Refills: 11 | Status: ON HOLD
Start: 2018-01-01 | End: 2019-01-01 | Stop reason: HOSPADM

## 2018-01-01 RX ORDER — ARIPIPRAZOLE 2 MG/1
2 TABLET ORAL DAILY
Status: DISCONTINUED | OUTPATIENT
Start: 2018-01-01 | End: 2018-01-01

## 2018-01-01 RX ORDER — VENLAFAXINE HYDROCHLORIDE 75 MG/1
75 CAPSULE, EXTENDED RELEASE ORAL NIGHTLY
Status: DISCONTINUED | OUTPATIENT
Start: 2018-01-01 | End: 2018-01-01 | Stop reason: HOSPADM

## 2018-01-01 RX ORDER — LEVALBUTEROL INHALATION SOLUTION 1.25 MG/3ML
1 SOLUTION RESPIRATORY (INHALATION) EVERY 8 HOURS PRN
Qty: 72 ML | Refills: 5 | Status: SHIPPED | OUTPATIENT
Start: 2018-01-01 | End: 2019-01-01 | Stop reason: SDUPTHER

## 2018-01-01 RX ORDER — TACROLIMUS 0.5 MG/1
0.5 CAPSULE ORAL DAILY
Qty: 30 CAPSULE | Refills: 11 | Status: ON HOLD | OUTPATIENT
Start: 2018-01-01 | End: 2018-01-01 | Stop reason: SDUPTHER

## 2018-01-01 RX ORDER — CETIRIZINE HYDROCHLORIDE 5 MG/1
5 TABLET ORAL DAILY
Status: DISCONTINUED | OUTPATIENT
Start: 2018-01-01 | End: 2018-01-01 | Stop reason: HOSPADM

## 2018-01-01 RX ORDER — LEVALBUTEROL INHALATION SOLUTION 1.25 MG/3ML
1 SOLUTION RESPIRATORY (INHALATION) EVERY 8 HOURS PRN
Qty: 72 ML | Refills: 5 | Status: SHIPPED | OUTPATIENT
Start: 2018-01-01 | End: 2018-01-01 | Stop reason: SDUPTHER

## 2018-01-01 RX ORDER — TOPIRAMATE 25 MG/1
50 TABLET ORAL 2 TIMES DAILY
Status: DISCONTINUED | OUTPATIENT
Start: 2018-01-01 | End: 2018-01-01 | Stop reason: HOSPADM

## 2018-01-01 RX ORDER — HYDROCODONE BITARTRATE AND ACETAMINOPHEN 500; 5 MG/1; MG/1
TABLET ORAL CONTINUOUS
Status: ACTIVE | OUTPATIENT
Start: 2018-01-01 | End: 2018-01-01

## 2018-01-01 RX ORDER — DIPHENHYDRAMINE HYDROCHLORIDE 50 MG/ML
25 INJECTION INTRAMUSCULAR; INTRAVENOUS ONCE AS NEEDED
Status: ACTIVE | OUTPATIENT
Start: 2018-01-01 | End: 2018-01-01

## 2018-01-01 RX ORDER — AMLODIPINE BESYLATE 10 MG/1
10 TABLET ORAL DAILY
Status: DISCONTINUED | OUTPATIENT
Start: 2018-01-01 | End: 2018-01-01 | Stop reason: HOSPADM

## 2018-01-01 RX ORDER — NALOXONE HCL 0.4 MG/ML
VIAL (ML) INJECTION
Status: COMPLETED
Start: 2018-01-01 | End: 2018-01-01

## 2018-01-01 RX ORDER — OMEPRAZOLE 40 MG/1
40 CAPSULE, DELAYED RELEASE ORAL EVERY MORNING
Qty: 30 CAPSULE | Refills: 11 | Status: SHIPPED | OUTPATIENT
Start: 2018-01-01 | End: 2019-01-01 | Stop reason: ALTCHOICE

## 2018-01-01 RX ORDER — BUTALBITAL, ACETAMINOPHEN AND CAFFEINE 50; 325; 40 MG/1; MG/1; MG/1
1 TABLET ORAL EVERY 6 HOURS PRN
Status: DISCONTINUED | OUTPATIENT
Start: 2018-01-01 | End: 2018-01-01 | Stop reason: HOSPADM

## 2018-01-01 RX ORDER — POLYETHYLENE GLYCOL 3350 17 G/17G
17 POWDER, FOR SOLUTION ORAL 2 TIMES DAILY
Status: DISCONTINUED | OUTPATIENT
Start: 2018-01-01 | End: 2018-01-01 | Stop reason: HOSPADM

## 2018-01-01 RX ORDER — HEPARIN SODIUM (PORCINE) LOCK FLUSH IV SOLN 100 UNIT/ML 100 UNIT/ML
100 SOLUTION INTRAVENOUS
Status: DISCONTINUED | OUTPATIENT
Start: 2018-01-01 | End: 2018-01-01

## 2018-01-01 RX ORDER — FLUTICASONE FUROATE AND VILANTEROL 100; 25 UG/1; UG/1
1 POWDER RESPIRATORY (INHALATION) DAILY
Status: DISCONTINUED | OUTPATIENT
Start: 2018-01-01 | End: 2018-01-01 | Stop reason: HOSPADM

## 2018-01-01 RX ORDER — METOPROLOL TARTRATE 25 MG/1
25 TABLET, FILM COATED ORAL 2 TIMES DAILY
Qty: 60 TABLET | Refills: 11 | Status: SHIPPED | OUTPATIENT
Start: 2018-01-01 | End: 2018-01-01 | Stop reason: SDUPTHER

## 2018-01-01 RX ORDER — PREDNISONE 5 MG/1
5 TABLET ORAL DAILY
Status: DISCONTINUED | OUTPATIENT
Start: 2018-01-01 | End: 2018-01-01

## 2018-01-01 RX ORDER — NALOXONE HYDROCHLORIDE 1 MG/ML
1 INJECTION INTRAMUSCULAR; INTRAVENOUS; SUBCUTANEOUS
Status: DISCONTINUED | OUTPATIENT
Start: 2018-01-01 | End: 2018-01-01

## 2018-01-01 RX ORDER — IPRATROPIUM BROMIDE 0.5 MG/2.5ML
0.5 SOLUTION RESPIRATORY (INHALATION) EVERY 6 HOURS
Status: DISCONTINUED | OUTPATIENT
Start: 2018-01-01 | End: 2018-01-01 | Stop reason: HOSPADM

## 2018-01-01 RX ORDER — MYCOPHENOLATE MOFETIL 250 MG/1
500 CAPSULE ORAL 2 TIMES DAILY
Qty: 120 CAPSULE | Refills: 11 | Status: ON HOLD | OUTPATIENT
Start: 2018-01-01 | End: 2019-01-01 | Stop reason: SDUPTHER

## 2018-01-01 RX ORDER — PROPOFOL 10 MG/ML
VIAL (ML) INTRAVENOUS
Status: DISCONTINUED | OUTPATIENT
Start: 2018-01-01 | End: 2018-01-01

## 2018-01-01 RX ORDER — ACETAMINOPHEN 500 MG
500 TABLET ORAL ONCE
Status: COMPLETED | OUTPATIENT
Start: 2018-01-01 | End: 2018-01-01

## 2018-01-01 RX ORDER — MYCOPHENOLATE MOFETIL 250 MG/1
500 CAPSULE ORAL 2 TIMES DAILY
Status: DISCONTINUED | OUTPATIENT
Start: 2018-01-01 | End: 2018-01-01

## 2018-01-01 RX ORDER — TIZANIDINE 4 MG/1
8 TABLET ORAL EVERY 8 HOURS PRN
Status: DISCONTINUED | OUTPATIENT
Start: 2018-01-01 | End: 2018-01-01 | Stop reason: HOSPADM

## 2018-01-01 RX ORDER — LEVALBUTEROL 1.25 MG/.5ML
1.25 SOLUTION, CONCENTRATE RESPIRATORY (INHALATION) EVERY 8 HOURS PRN
Status: DISCONTINUED | OUTPATIENT
Start: 2018-01-01 | End: 2018-01-01 | Stop reason: HOSPADM

## 2018-01-01 RX ORDER — NYSTATIN 100000 [USP'U]/ML
500000 SUSPENSION ORAL 3 TIMES DAILY
Status: DISCONTINUED | OUTPATIENT
Start: 2018-01-01 | End: 2018-01-01 | Stop reason: HOSPADM

## 2018-01-01 RX ORDER — MAGNESIUM SULFATE HEPTAHYDRATE 40 MG/ML
2 INJECTION, SOLUTION INTRAVENOUS
Status: DISPENSED | OUTPATIENT
Start: 2018-01-01 | End: 2018-01-01

## 2018-01-01 RX ORDER — SODIUM BICARBONATE 1 MEQ/ML
25 SYRINGE (ML) INTRAVENOUS ONCE
Status: COMPLETED | OUTPATIENT
Start: 2018-01-01 | End: 2018-01-01

## 2018-01-01 RX ORDER — DAPSONE 100 MG/1
100 TABLET ORAL DAILY
Status: DISCONTINUED | OUTPATIENT
Start: 2018-01-01 | End: 2018-01-01

## 2018-01-01 RX ORDER — FENTANYL CITRATE 50 UG/ML
INJECTION, SOLUTION INTRAMUSCULAR; INTRAVENOUS
Status: DISCONTINUED | OUTPATIENT
Start: 2018-01-01 | End: 2018-01-01

## 2018-01-01 RX ORDER — TOPIRAMATE 25 MG/1
50 TABLET ORAL 2 TIMES DAILY
Status: DISCONTINUED | OUTPATIENT
Start: 2018-01-01 | End: 2018-01-01

## 2018-01-01 RX ORDER — NYSTATIN 100000 [USP'U]/ML
500000 SUSPENSION ORAL 4 TIMES DAILY
Status: DISCONTINUED | OUTPATIENT
Start: 2018-01-01 | End: 2018-01-01

## 2018-01-01 RX ORDER — MONTELUKAST SODIUM 10 MG/1
10 TABLET ORAL DAILY
Status: DISCONTINUED | OUTPATIENT
Start: 2018-01-01 | End: 2018-01-01 | Stop reason: HOSPADM

## 2018-01-01 RX ORDER — MYCOPHENOLATE MOFETIL 250 MG/1
500 CAPSULE ORAL 2 TIMES DAILY
Qty: 120 CAPSULE | Refills: 11 | Status: SHIPPED | OUTPATIENT
Start: 2018-01-01 | End: 2018-01-01 | Stop reason: SDUPTHER

## 2018-01-01 RX ORDER — LIDOCAINE HYDROCHLORIDE 10 MG/ML
INJECTION, SOLUTION EPIDURAL; INFILTRATION; INTRACAUDAL; PERINEURAL
Status: DISCONTINUED | OUTPATIENT
Start: 2018-01-01 | End: 2018-01-01 | Stop reason: HOSPADM

## 2018-01-01 RX ORDER — INSULIN ASPART 100 [IU]/ML
1-10 INJECTION, SOLUTION INTRAVENOUS; SUBCUTANEOUS
Status: DISCONTINUED | OUTPATIENT
Start: 2018-01-01 | End: 2018-01-01

## 2018-01-01 RX ORDER — TACROLIMUS 0.5 MG/1
0.5 CAPSULE ORAL EVERY MORNING
Status: DISCONTINUED | OUTPATIENT
Start: 2018-01-01 | End: 2018-01-01

## 2018-01-01 RX ORDER — LANOLIN ALCOHOL/MO/W.PET/CERES
400 CREAM (GRAM) TOPICAL 2 TIMES DAILY
Status: DISCONTINUED | OUTPATIENT
Start: 2018-01-01 | End: 2018-01-01 | Stop reason: HOSPADM

## 2018-01-01 RX ORDER — PROMETHAZINE HYDROCHLORIDE 25 MG/1
25 TABLET ORAL EVERY 8 HOURS PRN
Status: DISCONTINUED | OUTPATIENT
Start: 2018-01-01 | End: 2018-01-01 | Stop reason: HOSPADM

## 2018-01-01 RX ORDER — CEFEPIME HYDROCHLORIDE 1 G/1
1 INJECTION, POWDER, FOR SOLUTION INTRAMUSCULAR; INTRAVENOUS
Status: DISCONTINUED | OUTPATIENT
Start: 2018-01-01 | End: 2018-01-01 | Stop reason: HOSPADM

## 2018-01-01 RX ORDER — MIRTAZAPINE 15 MG/1
30 TABLET, FILM COATED ORAL NIGHTLY
Status: DISCONTINUED | OUTPATIENT
Start: 2018-01-01 | End: 2018-01-01

## 2018-01-01 RX ORDER — FLUTICASONE PROPIONATE 50 MCG
1 SPRAY, SUSPENSION (ML) NASAL DAILY
Status: DISCONTINUED | OUTPATIENT
Start: 2018-01-01 | End: 2018-01-01 | Stop reason: HOSPADM

## 2018-01-01 RX ORDER — FLUCONAZOLE 200 MG/1
200 TABLET ORAL DAILY
Status: COMPLETED | OUTPATIENT
Start: 2018-01-01 | End: 2018-01-01

## 2018-01-01 RX ORDER — DIPHENHYDRAMINE HYDROCHLORIDE 50 MG/ML
50 INJECTION INTRAMUSCULAR; INTRAVENOUS ONCE
Status: COMPLETED | OUTPATIENT
Start: 2018-01-01 | End: 2018-01-01

## 2018-01-01 RX ORDER — DIPHENHYDRAMINE HCL 50 MG
50 CAPSULE ORAL EVERY 6 HOURS PRN
Status: DISCONTINUED | OUTPATIENT
Start: 2018-01-01 | End: 2018-01-01

## 2018-01-01 RX ORDER — FERROUS SULFATE, DRIED 160(50) MG
1 TABLET, EXTENDED RELEASE ORAL 2 TIMES DAILY WITH MEALS
Status: DISCONTINUED | OUTPATIENT
Start: 2018-01-01 | End: 2018-01-01

## 2018-01-01 RX ORDER — SODIUM CHLORIDE 0.9 % (FLUSH) 0.9 %
3 SYRINGE (ML) INJECTION
Status: DISCONTINUED | OUTPATIENT
Start: 2018-01-01 | End: 2018-01-01 | Stop reason: HOSPADM

## 2018-01-01 RX ORDER — VENLAFAXINE HYDROCHLORIDE 37.5 MG/1
150 CAPSULE, EXTENDED RELEASE ORAL NIGHTLY
Status: DISCONTINUED | OUTPATIENT
Start: 2018-01-01 | End: 2018-01-01 | Stop reason: HOSPADM

## 2018-01-01 RX ORDER — DEXAMETHASONE SODIUM PHOSPHATE 4 MG/ML
INJECTION, SOLUTION INTRA-ARTICULAR; INTRALESIONAL; INTRAMUSCULAR; INTRAVENOUS; SOFT TISSUE
Status: DISCONTINUED | OUTPATIENT
Start: 2018-01-01 | End: 2018-01-01

## 2018-01-01 RX ORDER — CEFEPIME HYDROCHLORIDE 1 G/1
1 INJECTION, POWDER, FOR SOLUTION INTRAMUSCULAR; INTRAVENOUS EVERY MORNING
Status: DISCONTINUED | OUTPATIENT
Start: 2018-01-01 | End: 2018-01-01

## 2018-01-01 RX ORDER — POTASSIUM CHLORIDE 20 MEQ/1
20 TABLET, EXTENDED RELEASE ORAL ONCE
Status: COMPLETED | OUTPATIENT
Start: 2018-01-01 | End: 2018-01-01

## 2018-01-01 RX ORDER — PANTOPRAZOLE SODIUM 40 MG/1
40 TABLET, DELAYED RELEASE ORAL DAILY
Status: DISCONTINUED | OUTPATIENT
Start: 2018-01-01 | End: 2018-01-01

## 2018-01-01 RX ORDER — VENLAFAXINE HYDROCHLORIDE 75 MG/1
75 CAPSULE, EXTENDED RELEASE ORAL NIGHTLY
Qty: 30 CAPSULE | Refills: 11
Start: 2018-01-01 | End: 2019-01-01 | Stop reason: DRUGHIGH

## 2018-01-01 RX ORDER — FOLIC ACID 1 MG/1
1000 TABLET ORAL DAILY
Status: DISCONTINUED | OUTPATIENT
Start: 2018-01-01 | End: 2018-01-01 | Stop reason: HOSPADM

## 2018-01-01 RX ORDER — LORAZEPAM 2 MG/ML
INJECTION INTRAMUSCULAR
Status: DISCONTINUED | OUTPATIENT
Start: 2018-01-01 | End: 2018-01-01

## 2018-01-01 RX ORDER — LORAZEPAM 2 MG/ML
2 INJECTION INTRAMUSCULAR ONCE
Status: COMPLETED | OUTPATIENT
Start: 2018-01-01 | End: 2018-01-01

## 2018-01-01 RX ORDER — FAMOTIDINE 10 MG/ML
20 INJECTION INTRAVENOUS
Status: COMPLETED | OUTPATIENT
Start: 2018-01-01 | End: 2018-01-01

## 2018-01-01 RX ORDER — ONDANSETRON 2 MG/ML
8 INJECTION INTRAMUSCULAR; INTRAVENOUS EVERY 6 HOURS PRN
Status: DISCONTINUED | OUTPATIENT
Start: 2018-01-01 | End: 2018-01-01 | Stop reason: HOSPADM

## 2018-01-01 RX ORDER — FUROSEMIDE 10 MG/ML
40 INJECTION INTRAMUSCULAR; INTRAVENOUS ONCE
Status: DISCONTINUED | OUTPATIENT
Start: 2018-01-01 | End: 2018-01-01

## 2018-01-01 RX ORDER — SIMETHICONE 80 MG
1 TABLET,CHEWABLE ORAL 3 TIMES DAILY PRN
Status: DISCONTINUED | OUTPATIENT
Start: 2018-01-01 | End: 2018-01-01 | Stop reason: HOSPADM

## 2018-01-01 RX ORDER — INSULIN ASPART 100 [IU]/ML
1-10 INJECTION, SOLUTION INTRAVENOUS; SUBCUTANEOUS
Status: DISCONTINUED | OUTPATIENT
Start: 2018-01-01 | End: 2018-01-01 | Stop reason: HOSPADM

## 2018-01-01 RX ORDER — FENTANYL CITRATE 50 UG/ML
25 INJECTION, SOLUTION INTRAMUSCULAR; INTRAVENOUS EVERY 5 MIN PRN
Status: DISCONTINUED | OUTPATIENT
Start: 2018-01-01 | End: 2018-01-01 | Stop reason: HOSPADM

## 2018-01-01 RX ORDER — TACROLIMUS 1 MG/1
1 CAPSULE ORAL 2 TIMES DAILY
Status: DISCONTINUED | OUTPATIENT
Start: 2018-01-01 | End: 2018-01-01

## 2018-01-01 RX ORDER — POTASSIUM CHLORIDE 750 MG/1
10 CAPSULE, EXTENDED RELEASE ORAL 2 TIMES DAILY
Status: COMPLETED | OUTPATIENT
Start: 2018-01-01 | End: 2018-01-01

## 2018-01-01 RX ORDER — NALOXONE HCL 0.4 MG/ML
0.4 VIAL (ML) INJECTION ONCE
Status: COMPLETED | OUTPATIENT
Start: 2018-01-01 | End: 2018-01-01

## 2018-01-01 RX ORDER — TIZANIDINE 2 MG/1
8 TABLET ORAL EVERY 6 HOURS PRN
Status: DISCONTINUED | OUTPATIENT
Start: 2018-01-01 | End: 2018-01-01 | Stop reason: HOSPADM

## 2018-01-01 RX ORDER — POLYETHYLENE GLYCOL 3350 17 G/17G
17 POWDER, FOR SOLUTION ORAL 2 TIMES DAILY
Status: DISCONTINUED | OUTPATIENT
Start: 2018-01-01 | End: 2018-01-01

## 2018-01-01 RX ORDER — KETOROLAC TROMETHAMINE 15 MG/ML
15 INJECTION, SOLUTION INTRAMUSCULAR; INTRAVENOUS ONCE
Status: COMPLETED | OUTPATIENT
Start: 2018-01-01 | End: 2018-01-01

## 2018-01-01 RX ORDER — MORPHINE SULFATE 15 MG/1
15 TABLET, FILM COATED, EXTENDED RELEASE ORAL 3 TIMES DAILY
Status: DISCONTINUED | OUTPATIENT
Start: 2018-01-01 | End: 2018-01-01 | Stop reason: HOSPADM

## 2018-01-01 RX ORDER — FUROSEMIDE 10 MG/ML
40 INJECTION INTRAMUSCULAR; INTRAVENOUS ONCE
Status: COMPLETED | OUTPATIENT
Start: 2018-01-01 | End: 2018-01-01

## 2018-01-01 RX ORDER — TACROLIMUS 1 MG/1
CAPSULE ORAL
Qty: 90 CAPSULE | Refills: 11 | Status: ON HOLD | OUTPATIENT
Start: 2018-01-01 | End: 2018-01-01 | Stop reason: SDUPTHER

## 2018-01-01 RX ORDER — MONTELUKAST SODIUM 10 MG/1
10 TABLET ORAL DAILY
Status: DISCONTINUED | OUTPATIENT
Start: 2018-01-01 | End: 2018-01-01

## 2018-01-01 RX ORDER — GABAPENTIN 300 MG/1
300 CAPSULE ORAL NIGHTLY
Status: DISCONTINUED | OUTPATIENT
Start: 2018-01-01 | End: 2018-01-01 | Stop reason: HOSPADM

## 2018-01-01 RX ORDER — PROMETHAZINE HYDROCHLORIDE 25 MG/1
TABLET ORAL
Qty: 60 TABLET | Refills: 0 | Status: ON HOLD | OUTPATIENT
Start: 2018-01-01 | End: 2018-01-01 | Stop reason: SDUPTHER

## 2018-01-01 RX ORDER — ONDANSETRON 2 MG/ML
4 INJECTION INTRAMUSCULAR; INTRAVENOUS ONCE
Status: DISCONTINUED | OUTPATIENT
Start: 2018-01-01 | End: 2018-01-01

## 2018-01-01 RX ORDER — GUAIFENESIN 600 MG/1
600 TABLET, EXTENDED RELEASE ORAL 2 TIMES DAILY PRN
Status: DISCONTINUED | OUTPATIENT
Start: 2018-01-01 | End: 2018-01-01 | Stop reason: HOSPADM

## 2018-01-01 RX ORDER — DIPHENHYDRAMINE HYDROCHLORIDE 50 MG/ML
25 INJECTION INTRAMUSCULAR; INTRAVENOUS EVERY 6 HOURS PRN
Status: DISCONTINUED | OUTPATIENT
Start: 2018-01-01 | End: 2018-01-01

## 2018-01-01 RX ORDER — GABAPENTIN 300 MG/1
300 CAPSULE ORAL 3 TIMES DAILY
Status: DISCONTINUED | OUTPATIENT
Start: 2018-01-01 | End: 2018-01-01 | Stop reason: HOSPADM

## 2018-01-01 RX ORDER — ACETAMINOPHEN 325 MG/1
650 TABLET ORAL EVERY 6 HOURS PRN
Status: DISCONTINUED | OUTPATIENT
Start: 2018-01-01 | End: 2018-01-01 | Stop reason: HOSPADM

## 2018-01-01 RX ORDER — GLUCAGON 1 MG
1 KIT INJECTION
Status: DISCONTINUED | OUTPATIENT
Start: 2018-01-01 | End: 2018-01-01 | Stop reason: HOSPADM

## 2018-01-01 RX ORDER — TACROLIMUS 0.5 MG/1
0.5 CAPSULE ORAL 2 TIMES DAILY
Status: DISCONTINUED | OUTPATIENT
Start: 2018-01-01 | End: 2018-01-01

## 2018-01-01 RX ORDER — MORPHINE SULFATE 15 MG/1
15 TABLET, FILM COATED, EXTENDED RELEASE ORAL EVERY 12 HOURS
Status: DISCONTINUED | OUTPATIENT
Start: 2018-01-01 | End: 2018-01-01 | Stop reason: HOSPADM

## 2018-01-01 RX ORDER — ONDANSETRON 4 MG/1
8 TABLET, FILM COATED ORAL EVERY 8 HOURS PRN
Status: DISCONTINUED | OUTPATIENT
Start: 2018-01-01 | End: 2018-01-01 | Stop reason: HOSPADM

## 2018-01-01 RX ORDER — LISINOPRIL 2.5 MG/1
10 TABLET ORAL DAILY
Status: DISCONTINUED | OUTPATIENT
Start: 2018-01-01 | End: 2018-01-01 | Stop reason: HOSPADM

## 2018-01-01 RX ORDER — TACROLIMUS 0.5 MG/1
0.5 CAPSULE ORAL EVERY 12 HOURS
Qty: 60 CAPSULE | Refills: 11 | Status: SHIPPED | OUTPATIENT
Start: 2018-01-01 | End: 2018-01-01

## 2018-01-01 RX ORDER — GUAIFENESIN 600 MG/1
600 TABLET, EXTENDED RELEASE ORAL 2 TIMES DAILY
Qty: 60 TABLET | Refills: 11 | Status: SHIPPED | OUTPATIENT
Start: 2018-01-01 | End: 2018-01-01

## 2018-01-01 RX ORDER — PROMETHAZINE HYDROCHLORIDE 25 MG/1
25 TABLET ORAL EVERY 6 HOURS PRN
Qty: 60 TABLET | Refills: 2 | Status: SHIPPED | OUTPATIENT
Start: 2018-01-01 | End: 2019-01-01 | Stop reason: SDUPTHER

## 2018-01-01 RX ORDER — NYSTATIN 100000 [USP'U]/ML
500000 SUSPENSION ORAL 3 TIMES DAILY
Qty: 473 ML | Refills: 1 | Status: SHIPPED | OUTPATIENT
Start: 2018-01-01 | End: 2018-01-01

## 2018-01-01 RX ORDER — FLUCONAZOLE 150 MG/1
150 TABLET ORAL WEEKLY
Status: DISCONTINUED | OUTPATIENT
Start: 2018-01-01 | End: 2018-01-01

## 2018-01-01 RX ORDER — FLUTICASONE PROPIONATE 50 MCG
2 SPRAY, SUSPENSION (ML) NASAL DAILY
Status: DISCONTINUED | OUTPATIENT
Start: 2018-01-01 | End: 2018-01-01 | Stop reason: HOSPADM

## 2018-01-01 RX ORDER — IPRATROPIUM BROMIDE 0.5 MG/2.5ML
0.5 SOLUTION RESPIRATORY (INHALATION) EVERY 6 HOURS PRN
Status: DISCONTINUED | OUTPATIENT
Start: 2018-01-01 | End: 2018-01-01 | Stop reason: HOSPADM

## 2018-01-01 RX ORDER — TACROLIMUS 1 MG/1
3 CAPSULE ORAL 2 TIMES DAILY
Status: DISCONTINUED | OUTPATIENT
Start: 2018-01-01 | End: 2018-01-01 | Stop reason: HOSPADM

## 2018-01-01 RX ORDER — TOPIRAMATE 25 MG/1
25 TABLET ORAL 2 TIMES DAILY
Status: DISCONTINUED | OUTPATIENT
Start: 2018-01-01 | End: 2018-01-01

## 2018-01-01 RX ORDER — GABAPENTIN 300 MG/1
300 CAPSULE ORAL 3 TIMES DAILY
Qty: 90 CAPSULE | Refills: 11
Start: 2018-01-01 | End: 2019-01-01 | Stop reason: SDUPTHER

## 2018-01-01 RX ORDER — HYDROMORPHONE HYDROCHLORIDE 1 MG/ML
0.2 INJECTION, SOLUTION INTRAMUSCULAR; INTRAVENOUS; SUBCUTANEOUS EVERY 5 MIN PRN
Status: DISCONTINUED | OUTPATIENT
Start: 2018-01-01 | End: 2018-01-01 | Stop reason: HOSPADM

## 2018-01-01 RX ORDER — METOPROLOL TARTRATE 25 MG/1
25 TABLET, FILM COATED ORAL 2 TIMES DAILY
Qty: 60 TABLET | Refills: 11 | Status: ON HOLD | OUTPATIENT
Start: 2018-01-01 | End: 2019-01-01 | Stop reason: HOSPADM

## 2018-01-01 RX ORDER — ONDANSETRON 4 MG/1
8 TABLET, FILM COATED ORAL EVERY 6 HOURS PRN
Status: DISCONTINUED | OUTPATIENT
Start: 2018-01-01 | End: 2018-01-01 | Stop reason: HOSPADM

## 2018-01-01 RX ORDER — ONDANSETRON 2 MG/ML
INJECTION INTRAMUSCULAR; INTRAVENOUS
Status: DISCONTINUED | OUTPATIENT
Start: 2018-01-01 | End: 2018-01-01

## 2018-01-01 RX ORDER — LANOLIN ALCOHOL/MO/W.PET/CERES
400 CREAM (GRAM) TOPICAL 2 TIMES DAILY
Status: DISCONTINUED | OUTPATIENT
Start: 2018-01-01 | End: 2018-01-01

## 2018-01-01 RX ORDER — DIPHENHYDRAMINE HYDROCHLORIDE 50 MG/ML
50 INJECTION INTRAMUSCULAR; INTRAVENOUS 2 TIMES DAILY
Status: DISCONTINUED | OUTPATIENT
Start: 2018-01-01 | End: 2018-01-01

## 2018-01-01 RX ADMIN — OXYCODONE HYDROCHLORIDE 10 MG: 10 TABLET ORAL at 01:10

## 2018-01-01 RX ADMIN — METOPROLOL TARTRATE 25 MG: 25 TABLET, FILM COATED ORAL at 08:12

## 2018-01-01 RX ADMIN — VANCOMYCIN HYDROCHLORIDE 750 MG: 750 INJECTION, POWDER, LYOPHILIZED, FOR SOLUTION INTRAVENOUS at 08:11

## 2018-01-01 RX ADMIN — TACROLIMUS 3 MG: 1 CAPSULE ORAL at 05:10

## 2018-01-01 RX ADMIN — NALOXONE HYDROCHLORIDE 0.4 MG: 0.4 INJECTION, SOLUTION INTRAMUSCULAR; INTRAVENOUS; SUBCUTANEOUS at 04:11

## 2018-01-01 RX ADMIN — OXYCODONE HYDROCHLORIDE 10 MG: 10 TABLET ORAL at 08:12

## 2018-01-01 RX ADMIN — MYCOPHENOLATE MOFETIL 500 MG: 250 CAPSULE ORAL at 09:12

## 2018-01-01 RX ADMIN — OXYCODONE HYDROCHLORIDE 10 MG: 10 TABLET ORAL at 07:09

## 2018-01-01 RX ADMIN — MONTELUKAST SODIUM 10 MG: 10 TABLET, FILM COATED ORAL at 09:09

## 2018-01-01 RX ADMIN — LEVALBUTEROL 1.25 MG: 1.25 SOLUTION, CONCENTRATE RESPIRATORY (INHALATION) at 01:12

## 2018-01-01 RX ADMIN — MYCOPHENOLATE MOFETIL 500 MG: 250 CAPSULE ORAL at 08:11

## 2018-01-01 RX ADMIN — MORPHINE SULFATE 15 MG: 15 TABLET, FILM COATED, EXTENDED RELEASE ORAL at 04:10

## 2018-01-01 RX ADMIN — MAGNESIUM OXIDE TAB 400 MG (241.3 MG ELEMENTAL MG) 400 MG: 400 (241.3 MG) TAB at 09:10

## 2018-01-01 RX ADMIN — VENLAFAXINE HYDROCHLORIDE 75 MG: 75 CAPSULE, EXTENDED RELEASE ORAL at 10:10

## 2018-01-01 RX ADMIN — FLUTICASONE PROPIONATE 50 MCG: 50 SPRAY, METERED NASAL at 09:12

## 2018-01-01 RX ADMIN — OXYCODONE HYDROCHLORIDE 10 MG: 10 TABLET ORAL at 05:12

## 2018-01-01 RX ADMIN — MORPHINE SULFATE 15 MG: 15 TABLET, EXTENDED RELEASE ORAL at 08:12

## 2018-01-01 RX ADMIN — FLUTICASONE PROPIONATE 100 MCG: 50 SPRAY, METERED NASAL at 08:11

## 2018-01-01 RX ADMIN — INSULIN HUMAN 10 UNITS: 100 INJECTION, SOLUTION PARENTERAL at 05:11

## 2018-01-01 RX ADMIN — LORAZEPAM 2 MG: 1 TABLET ORAL at 11:12

## 2018-01-01 RX ADMIN — CEFEPIME 1 G: 1 INJECTION, POWDER, FOR SOLUTION INTRAMUSCULAR; INTRAVENOUS at 08:11

## 2018-01-01 RX ADMIN — LEVALBUTEROL HYDROCHLORIDE 0.63 MG: 0.63 SOLUTION RESPIRATORY (INHALATION) at 05:09

## 2018-01-01 RX ADMIN — OXYCODONE HYDROCHLORIDE 10 MG: 10 TABLET ORAL at 02:11

## 2018-01-01 RX ADMIN — METOPROLOL TARTRATE 25 MG: 25 TABLET, FILM COATED ORAL at 09:12

## 2018-01-01 RX ADMIN — TOPIRAMATE 25 MG: 25 TABLET, FILM COATED ORAL at 08:12

## 2018-01-01 RX ADMIN — OYSTER SHELL CALCIUM WITH VITAMIN D 1 TABLET: 500; 200 TABLET, FILM COATED ORAL at 09:09

## 2018-01-01 RX ADMIN — DIPHENHYDRAMINE HYDROCHLORIDE 50 MG: 50 INJECTION, SOLUTION INTRAMUSCULAR; INTRAVENOUS at 07:12

## 2018-01-01 RX ADMIN — LORAZEPAM 1 MG: 1 TABLET ORAL at 03:10

## 2018-01-01 RX ADMIN — ONDANSETRON 8 MG: 2 INJECTION INTRAMUSCULAR; INTRAVENOUS at 08:11

## 2018-01-01 RX ADMIN — FLUTICASONE FUROATE AND VILANTEROL TRIFENATATE 1 PUFF: 100; 25 POWDER RESPIRATORY (INHALATION) at 09:12

## 2018-01-01 RX ADMIN — PANCRELIPASE 5 CAPSULE: 60000; 12000; 38000 CAPSULE, DELAYED RELEASE PELLETS ORAL at 08:11

## 2018-01-01 RX ADMIN — OYSTER SHELL CALCIUM WITH VITAMIN D 1 TABLET: 500; 200 TABLET, FILM COATED ORAL at 08:12

## 2018-01-01 RX ADMIN — DEXTROSE 500 MG: 50 INJECTION, SOLUTION INTRAVENOUS at 08:11

## 2018-01-01 RX ADMIN — TIZANIDINE 8 MG: 4 TABLET ORAL at 11:10

## 2018-01-01 RX ADMIN — ONDANSETRON 4 MG: 2 INJECTION INTRAMUSCULAR; INTRAVENOUS at 12:10

## 2018-01-01 RX ADMIN — MEROPENEM 2 G: 1 INJECTION, POWDER, FOR SOLUTION INTRAVENOUS at 09:09

## 2018-01-01 RX ADMIN — MONTELUKAST SODIUM 10 MG: 10 TABLET, FILM COATED ORAL at 09:12

## 2018-01-01 RX ADMIN — MEROPENEM 2 G: 1 INJECTION, POWDER, FOR SOLUTION INTRAVENOUS at 04:09

## 2018-01-01 RX ADMIN — TOPIRAMATE 50 MG: 25 TABLET, FILM COATED ORAL at 08:10

## 2018-01-01 RX ADMIN — PROMETHAZINE HYDROCHLORIDE 6.25 MG: 25 INJECTION, SOLUTION INTRAMUSCULAR; INTRAVENOUS at 01:12

## 2018-01-01 RX ADMIN — MYCOPHENOLATE MOFETIL 500 MG: 250 CAPSULE ORAL at 03:10

## 2018-01-01 RX ADMIN — LORAZEPAM 2 MG: 1 TABLET ORAL at 01:12

## 2018-01-01 RX ADMIN — MORPHINE SULFATE 15 MG: 15 TABLET, FILM COATED, EXTENDED RELEASE ORAL at 05:09

## 2018-01-01 RX ADMIN — VANCOMYCIN HYDROCHLORIDE 750 MG: 750 INJECTION, POWDER, LYOPHILIZED, FOR SOLUTION INTRAVENOUS at 09:09

## 2018-01-01 RX ADMIN — MONTELUKAST SODIUM 10 MG: 10 TABLET, FILM COATED ORAL at 08:09

## 2018-01-01 RX ADMIN — OXYCODONE HYDROCHLORIDE 10 MG: 10 TABLET ORAL at 05:09

## 2018-01-01 RX ADMIN — OXYCODONE HYDROCHLORIDE 10 MG: 10 TABLET ORAL at 05:10

## 2018-01-01 RX ADMIN — OXYCODONE HYDROCHLORIDE 10 MG: 10 TABLET ORAL at 12:10

## 2018-01-01 RX ADMIN — FLUTICASONE PROPIONATE 100 MCG: 50 SPRAY, METERED NASAL at 08:10

## 2018-01-01 RX ADMIN — FOLIC ACID 1000 MCG: 1 TABLET ORAL at 09:12

## 2018-01-01 RX ADMIN — FUROSEMIDE 40 MG: 10 INJECTION, SOLUTION INTRAMUSCULAR; INTRAVENOUS at 10:10

## 2018-01-01 RX ADMIN — OYSTER SHELL CALCIUM WITH VITAMIN D 1 TABLET: 500; 200 TABLET, FILM COATED ORAL at 09:12

## 2018-01-01 RX ADMIN — VENLAFAXINE HYDROCHLORIDE 150 MG: 37.5 CAPSULE, EXTENDED RELEASE ORAL at 08:11

## 2018-01-01 RX ADMIN — OYSTER SHELL CALCIUM WITH VITAMIN D 1 TABLET: 500; 200 TABLET, FILM COATED ORAL at 08:10

## 2018-01-01 RX ADMIN — BUTALBITAL, ACETAMINOPHEN AND CAFFEINE 1 TABLET: 50; 325; 40 TABLET ORAL at 01:12

## 2018-01-01 RX ADMIN — FLUTICASONE FUROATE AND VILANTEROL TRIFENATATE 1 PUFF: 100; 25 POWDER RESPIRATORY (INHALATION) at 01:12

## 2018-01-01 RX ADMIN — OXYCODONE HYDROCHLORIDE 10 MG: 10 TABLET ORAL at 01:09

## 2018-01-01 RX ADMIN — MAGNESIUM OXIDE TAB 400 MG (241.3 MG ELEMENTAL MG) 400 MG: 400 (241.3 MG) TAB at 08:12

## 2018-01-01 RX ADMIN — PANCRELIPASE 5 CAPSULE: 24000; 76000; 120000 CAPSULE, DELAYED RELEASE PELLETS ORAL at 04:12

## 2018-01-01 RX ADMIN — CEFEPIME 1 G: 1 INJECTION, POWDER, FOR SOLUTION INTRAMUSCULAR; INTRAVENOUS at 02:12

## 2018-01-01 RX ADMIN — AMLODIPINE BESYLATE 10 MG: 10 TABLET ORAL at 09:11

## 2018-01-01 RX ADMIN — PANTOPRAZOLE SODIUM 40 MG: 40 TABLET, DELAYED RELEASE ORAL at 09:12

## 2018-01-01 RX ADMIN — TOPIRAMATE 25 MG: 25 TABLET, FILM COATED ORAL at 10:11

## 2018-01-01 RX ADMIN — GABAPENTIN 1100 MG: 400 CAPSULE ORAL at 03:09

## 2018-01-01 RX ADMIN — DIPHENHYDRAMINE HYDROCHLORIDE 50 MG: 50 INJECTION, SOLUTION INTRAMUSCULAR; INTRAVENOUS at 02:10

## 2018-01-01 RX ADMIN — PIPERACILLIN AND TAZOBACTAM 4.5 G: 4; .5 INJECTION, POWDER, LYOPHILIZED, FOR SOLUTION INTRAVENOUS; PARENTERAL at 07:11

## 2018-01-01 RX ADMIN — DAPSONE 100 MG: 100 TABLET ORAL at 04:09

## 2018-01-01 RX ADMIN — FLUTICASONE FUROATE AND VILANTEROL TRIFENATATE 1 PUFF: 100; 25 POWDER RESPIRATORY (INHALATION) at 09:11

## 2018-01-01 RX ADMIN — TOPIRAMATE 25 MG: 25 TABLET, FILM COATED ORAL at 08:11

## 2018-01-01 RX ADMIN — DAPSONE 100 MG: 100 TABLET ORAL at 03:10

## 2018-01-01 RX ADMIN — GABAPENTIN 1100 MG: 400 CAPSULE ORAL at 08:10

## 2018-01-01 RX ADMIN — AMLODIPINE BESYLATE 10 MG: 10 TABLET ORAL at 08:12

## 2018-01-01 RX ADMIN — MIDAZOLAM HYDROCHLORIDE 2 MG: 1 INJECTION, SOLUTION INTRAMUSCULAR; INTRAVENOUS at 01:12

## 2018-01-01 RX ADMIN — SODIUM BICARBONATE 25 MEQ: 84 INJECTION, SOLUTION INTRAVENOUS at 05:11

## 2018-01-01 RX ADMIN — MYCOPHENOLATE MOFETIL 500 MG: 250 CAPSULE ORAL at 08:12

## 2018-01-01 RX ADMIN — MONTELUKAST SODIUM 10 MG: 10 TABLET, FILM COATED ORAL at 05:09

## 2018-01-01 RX ADMIN — OXYCODONE HYDROCHLORIDE 10 MG: 10 TABLET ORAL at 11:10

## 2018-01-01 RX ADMIN — OXYCODONE HYDROCHLORIDE 10 MG: 10 TABLET ORAL at 08:10

## 2018-01-01 RX ADMIN — ACETAMINOPHEN 1000 MG: 10 INJECTION, SOLUTION INTRAVENOUS at 09:12

## 2018-01-01 RX ADMIN — PREDNISONE 5 MG: 5 TABLET ORAL at 08:12

## 2018-01-01 RX ADMIN — OXYCODONE HYDROCHLORIDE 10 MG: 10 TABLET ORAL at 01:12

## 2018-01-01 RX ADMIN — SODIUM CHLORIDE, PRESERVATIVE FREE 100 UNITS: 5 INJECTION INTRAVENOUS at 05:12

## 2018-01-01 RX ADMIN — NYSTATIN 500000 UNITS: 500000 SUSPENSION ORAL at 08:11

## 2018-01-01 RX ADMIN — SODIUM CHLORIDE, PRESERVATIVE FREE 100 UNITS: 5 INJECTION INTRAVENOUS at 03:12

## 2018-01-01 RX ADMIN — DIPHENHYDRAMINE HYDROCHLORIDE 50 MG: 50 INJECTION, SOLUTION INTRAMUSCULAR; INTRAVENOUS at 01:09

## 2018-01-01 RX ADMIN — MONTELUKAST SODIUM 10 MG: 10 TABLET, FILM COATED ORAL at 08:10

## 2018-01-01 RX ADMIN — TACROLIMUS 1.5 MG: 1 CAPSULE ORAL at 07:12

## 2018-01-01 RX ADMIN — OXYCODONE HYDROCHLORIDE 10 MG: 10 TABLET ORAL at 07:12

## 2018-01-01 RX ADMIN — ONDANSETRON 8 MG: 2 INJECTION INTRAMUSCULAR; INTRAVENOUS at 03:11

## 2018-01-01 RX ADMIN — PANCRELIPASE 5 CAPSULE: 24000; 76000; 120000 CAPSULE, DELAYED RELEASE PELLETS ORAL at 09:12

## 2018-01-01 RX ADMIN — DIPHENHYDRAMINE HYDROCHLORIDE 50 MG: 50 INJECTION, SOLUTION INTRAMUSCULAR; INTRAVENOUS at 01:10

## 2018-01-01 RX ADMIN — MORPHINE SULFATE 15 MG: 15 TABLET, EXTENDED RELEASE ORAL at 10:11

## 2018-01-01 RX ADMIN — LORAZEPAM 2 MG: 1 TABLET ORAL at 12:12

## 2018-01-01 RX ADMIN — LEVALBUTEROL 1.25 MG: 1.25 SOLUTION, CONCENTRATE RESPIRATORY (INHALATION) at 12:12

## 2018-01-01 RX ADMIN — OXYCODONE HYDROCHLORIDE 10 MG: 10 TABLET ORAL at 10:12

## 2018-01-01 RX ADMIN — DIPHENHYDRAMINE HYDROCHLORIDE 50 MG: 50 INJECTION, SOLUTION INTRAMUSCULAR; INTRAVENOUS at 04:09

## 2018-01-01 RX ADMIN — Medication 1 TABLET: at 03:10

## 2018-01-01 RX ADMIN — TOPIRAMATE 50 MG: 25 TABLET, FILM COATED ORAL at 09:09

## 2018-01-01 RX ADMIN — LORAZEPAM 2 MG: 1 TABLET ORAL at 09:12

## 2018-01-01 RX ADMIN — ACETAMINOPHEN 1000 MG: 500 TABLET, FILM COATED ORAL at 06:11

## 2018-01-01 RX ADMIN — CETIRIZINE HYDROCHLORIDE 5 MG: 5 TABLET ORAL at 09:12

## 2018-01-01 RX ADMIN — TACROLIMUS 0.5 MG: 1 CAPSULE ORAL at 08:10

## 2018-01-01 RX ADMIN — MAGNESIUM OXIDE TAB 400 MG (241.3 MG ELEMENTAL MG) 400 MG: 400 (241.3 MG) TAB at 09:12

## 2018-01-01 RX ADMIN — Medication 180 MG: at 09:10

## 2018-01-01 RX ADMIN — ACETAMINOPHEN 650 MG: 325 TABLET, FILM COATED ORAL at 01:12

## 2018-01-01 RX ADMIN — ARIPIPRAZOLE 2 MG: 2 TABLET ORAL at 09:12

## 2018-01-01 RX ADMIN — OXYCODONE HYDROCHLORIDE 10 MG: 10 TABLET ORAL at 06:12

## 2018-01-01 RX ADMIN — GABAPENTIN 300 MG: 300 CAPSULE ORAL at 02:12

## 2018-01-01 RX ADMIN — GABAPENTIN 1100 MG: 400 CAPSULE ORAL at 10:10

## 2018-01-01 RX ADMIN — MAGNESIUM SULFATE IN WATER 2 G: 40 INJECTION, SOLUTION INTRAVENOUS at 05:11

## 2018-01-01 RX ADMIN — ARIPIPRAZOLE 2 MG: 2 TABLET ORAL at 08:11

## 2018-01-01 RX ADMIN — PROMETHAZINE HYDROCHLORIDE 25 MG: 25 INJECTION INTRAMUSCULAR; INTRAVENOUS at 03:11

## 2018-01-01 RX ADMIN — DAPSONE 100 MG: 100 TABLET ORAL at 08:12

## 2018-01-01 RX ADMIN — PANTOPRAZOLE SODIUM 40 MG: 40 INJECTION, POWDER, LYOPHILIZED, FOR SOLUTION INTRAVENOUS at 09:11

## 2018-01-01 RX ADMIN — CETIRIZINE HYDROCHLORIDE 5 MG: 5 TABLET ORAL at 08:12

## 2018-01-01 RX ADMIN — TACROLIMUS 2 MG: 1 CAPSULE ORAL at 09:12

## 2018-01-01 RX ADMIN — PREDNISONE 5 MG: 5 TABLET ORAL at 09:11

## 2018-01-01 RX ADMIN — POTASSIUM CHLORIDE 20 MEQ: 1500 TABLET, EXTENDED RELEASE ORAL at 10:11

## 2018-01-01 RX ADMIN — SODIUM BICARBONATE: 84 INJECTION, SOLUTION INTRAVENOUS at 01:11

## 2018-01-01 RX ADMIN — DAPSONE 100 MG: 100 TABLET ORAL at 09:09

## 2018-01-01 RX ADMIN — DAPSONE 100 MG: 100 TABLET ORAL at 09:12

## 2018-01-01 RX ADMIN — LORAZEPAM 1 MG: 1 TABLET ORAL at 10:09

## 2018-01-01 RX ADMIN — MEROPENEM 2 G: 1 INJECTION, POWDER, FOR SOLUTION INTRAVENOUS at 05:09

## 2018-01-01 RX ADMIN — DIPHENHYDRAMINE HYDROCHLORIDE 50 MG: 50 INJECTION, SOLUTION INTRAMUSCULAR; INTRAVENOUS at 03:12

## 2018-01-01 RX ADMIN — OXYCODONE HYDROCHLORIDE 10 MG: 10 TABLET ORAL at 09:12

## 2018-01-01 RX ADMIN — TACROLIMUS 3 MG: 1 CAPSULE ORAL at 08:09

## 2018-01-01 RX ADMIN — CEFEPIME 1 G: 1 INJECTION, POWDER, FOR SOLUTION INTRAMUSCULAR; INTRAVENOUS at 11:12

## 2018-01-01 RX ADMIN — DIPHENHYDRAMINE HYDROCHLORIDE 50 MG: 50 INJECTION, SOLUTION INTRAMUSCULAR; INTRAVENOUS at 12:11

## 2018-01-01 RX ADMIN — Medication 1 TABLET: at 09:09

## 2018-01-01 RX ADMIN — AMLODIPINE BESYLATE 10 MG: 10 TABLET ORAL at 04:09

## 2018-01-01 RX ADMIN — OXYCODONE HYDROCHLORIDE 10 MG: 10 TABLET ORAL at 03:12

## 2018-01-01 RX ADMIN — FLUTICASONE PROPIONATE 100 MCG: 50 SPRAY, METERED NASAL at 09:09

## 2018-01-01 RX ADMIN — PROMETHAZINE HYDROCHLORIDE 6.25 MG: 25 INJECTION INTRAMUSCULAR; INTRAVENOUS at 01:12

## 2018-01-01 RX ADMIN — MAGNESIUM OXIDE TAB 400 MG (241.3 MG ELEMENTAL MG) 400 MG: 400 (241.3 MG) TAB at 09:09

## 2018-01-01 RX ADMIN — VENLAFAXINE HYDROCHLORIDE 37.5 MG: 37.5 CAPSULE, EXTENDED RELEASE ORAL at 08:12

## 2018-01-01 RX ADMIN — FLUTICASONE FUROATE AND VILANTEROL TRIFENATATE 1 PUFF: 100; 25 POWDER RESPIRATORY (INHALATION) at 08:11

## 2018-01-01 RX ADMIN — TACROLIMUS 2.5 MG: 1 CAPSULE ORAL at 05:12

## 2018-01-01 RX ADMIN — MAGNESIUM SULFATE IN WATER 2 G: 40 INJECTION, SOLUTION INTRAVENOUS at 12:11

## 2018-01-01 RX ADMIN — SODIUM CHLORIDE, PRESERVATIVE FREE 100 UNITS: 5 INJECTION INTRAVENOUS at 06:12

## 2018-01-01 RX ADMIN — MYCOPHENOLATE MOFETIL 500 MG: 250 CAPSULE ORAL at 08:09

## 2018-01-01 RX ADMIN — METOPROLOL TARTRATE 25 MG: 25 TABLET ORAL at 11:11

## 2018-01-01 RX ADMIN — MORPHINE SULFATE 15 MG: 15 TABLET, FILM COATED, EXTENDED RELEASE ORAL at 12:10

## 2018-01-01 RX ADMIN — OXYCODONE HYDROCHLORIDE 10 MG: 10 TABLET ORAL at 04:10

## 2018-01-01 RX ADMIN — DIPHENHYDRAMINE HYDROCHLORIDE 25 MG: 50 INJECTION, SOLUTION INTRAMUSCULAR; INTRAVENOUS at 07:11

## 2018-01-01 RX ADMIN — CEFEPIME 1 G: 1 INJECTION, POWDER, FOR SOLUTION INTRAMUSCULAR; INTRAVENOUS at 05:12

## 2018-01-01 RX ADMIN — PANCRELIPASE 5 CAPSULE: 24000; 76000; 120000 CAPSULE, DELAYED RELEASE PELLETS ORAL at 08:12

## 2018-01-01 RX ADMIN — TIZANIDINE 8 MG: 4 TABLET ORAL at 11:12

## 2018-01-01 RX ADMIN — VENLAFAXINE HYDROCHLORIDE 37.5 MG: 37.5 CAPSULE, EXTENDED RELEASE ORAL at 09:12

## 2018-01-01 RX ADMIN — LISINOPRIL 10 MG: 2.5 TABLET ORAL at 09:10

## 2018-01-01 RX ADMIN — ACETAMINOPHEN 1000 MG: 500 TABLET, FILM COATED ORAL at 10:11

## 2018-01-01 RX ADMIN — OXYCODONE HYDROCHLORIDE 5 MG: 5 TABLET ORAL at 08:11

## 2018-01-01 RX ADMIN — MORPHINE SULFATE 15 MG: 15 TABLET, FILM COATED, EXTENDED RELEASE ORAL at 03:10

## 2018-01-01 RX ADMIN — SODIUM BICARBONATE: 84 INJECTION, SOLUTION INTRAVENOUS at 05:11

## 2018-01-01 RX ADMIN — PREDNISONE 5 MG: 5 TABLET ORAL at 09:12

## 2018-01-01 RX ADMIN — MYCOPHENOLATE MOFETIL 500 MG: 250 CAPSULE ORAL at 10:09

## 2018-01-01 RX ADMIN — GABAPENTIN 300 MG: 300 CAPSULE ORAL at 08:12

## 2018-01-01 RX ADMIN — TACROLIMUS 3 MG: 1 CAPSULE ORAL at 05:09

## 2018-01-01 RX ADMIN — TACROLIMUS 0.5 MG: 0.5 CAPSULE ORAL at 05:11

## 2018-01-01 RX ADMIN — TACROLIMUS 0.5 MG: 1 CAPSULE ORAL at 05:09

## 2018-01-01 RX ADMIN — TOPIRAMATE 50 MG: 25 TABLET, FILM COATED ORAL at 08:09

## 2018-01-01 RX ADMIN — TACROLIMUS 2.5 MG: 1 CAPSULE ORAL at 08:12

## 2018-01-01 RX ADMIN — INSULIN ASPART 2 UNITS: 100 INJECTION, SOLUTION INTRAVENOUS; SUBCUTANEOUS at 08:10

## 2018-01-01 RX ADMIN — GUAIFENESIN 600 MG: 600 TABLET, EXTENDED RELEASE ORAL at 12:12

## 2018-01-01 RX ADMIN — MAGNESIUM OXIDE TAB 400 MG (241.3 MG ELEMENTAL MG) 400 MG: 400 (241.3 MG) TAB at 12:12

## 2018-01-01 RX ADMIN — GABAPENTIN 1100 MG: 400 CAPSULE ORAL at 02:09

## 2018-01-01 RX ADMIN — GABAPENTIN 300 MG: 300 CAPSULE ORAL at 09:12

## 2018-01-01 RX ADMIN — FLUTICASONE PROPIONATE 100 MCG: 50 SPRAY, METERED NASAL at 09:11

## 2018-01-01 RX ADMIN — DIPHENHYDRAMINE HYDROCHLORIDE 50 MG: 50 INJECTION, SOLUTION INTRAMUSCULAR; INTRAVENOUS at 11:10

## 2018-01-01 RX ADMIN — SODIUM CHLORIDE: 0.9 INJECTION, SOLUTION INTRAVENOUS at 12:10

## 2018-01-01 RX ADMIN — TIZANIDINE 8 MG: 4 TABLET ORAL at 09:10

## 2018-01-01 RX ADMIN — MORPHINE SULFATE 15 MG: 15 TABLET, FILM COATED, EXTENDED RELEASE ORAL at 09:09

## 2018-01-01 RX ADMIN — OXYCODONE HYDROCHLORIDE 10 MG: 10 TABLET ORAL at 03:10

## 2018-01-01 RX ADMIN — DIPHENHYDRAMINE HYDROCHLORIDE 50 MG: 50 INJECTION, SOLUTION INTRAMUSCULAR; INTRAVENOUS at 08:09

## 2018-01-01 RX ADMIN — AMLODIPINE BESYLATE 10 MG: 10 TABLET ORAL at 11:11

## 2018-01-01 RX ADMIN — OYSTER SHELL CALCIUM WITH VITAMIN D 1 TABLET: 500; 200 TABLET, FILM COATED ORAL at 05:09

## 2018-01-01 RX ADMIN — PROPOFOL 120 MG: 10 INJECTION, EMULSION INTRAVENOUS at 01:12

## 2018-01-01 RX ADMIN — FLUCONAZOLE 200 MG: 200 TABLET ORAL at 08:12

## 2018-01-01 RX ADMIN — MORPHINE SULFATE 15 MG: 15 TABLET, EXTENDED RELEASE ORAL at 09:12

## 2018-01-01 RX ADMIN — ARIPIPRAZOLE 2 MG: 2 TABLET ORAL at 09:09

## 2018-01-01 RX ADMIN — Medication 1 TABLET: at 01:12

## 2018-01-01 RX ADMIN — MAGNESIUM OXIDE TAB 400 MG (241.3 MG ELEMENTAL MG) 400 MG: 400 (241.3 MG) TAB at 08:10

## 2018-01-01 RX ADMIN — SODIUM CHLORIDE: 9 INJECTION, SOLUTION INTRAVENOUS at 08:10

## 2018-01-01 RX ADMIN — DIPHENHYDRAMINE HYDROCHLORIDE 50 MG: 50 INJECTION, SOLUTION INTRAMUSCULAR; INTRAVENOUS at 09:12

## 2018-01-01 RX ADMIN — NYSTATIN 500000 UNITS: 500000 SUSPENSION ORAL at 09:12

## 2018-01-01 RX ADMIN — PANCRELIPASE 5 CAPSULE: 24000; 76000; 120000 CAPSULE, DELAYED RELEASE PELLETS ORAL at 05:12

## 2018-01-01 RX ADMIN — TIZANIDINE 8 MG: 4 TABLET ORAL at 11:09

## 2018-01-01 RX ADMIN — GUAIFENESIN 600 MG: 600 TABLET, EXTENDED RELEASE ORAL at 09:12

## 2018-01-01 RX ADMIN — BUTALBITAL, ACETAMINOPHEN AND CAFFEINE 1 TABLET: 50; 325; 40 TABLET ORAL at 08:12

## 2018-01-01 RX ADMIN — VANCOMYCIN HYDROCHLORIDE 750 MG: 750 INJECTION, POWDER, LYOPHILIZED, FOR SOLUTION INTRAVENOUS at 11:11

## 2018-01-01 RX ADMIN — TOPIRAMATE 25 MG: 25 TABLET, FILM COATED ORAL at 09:12

## 2018-01-01 RX ADMIN — PANCRELIPASE 5 CAPSULE: 24000; 76000; 120000 CAPSULE, DELAYED RELEASE PELLETS ORAL at 11:12

## 2018-01-01 RX ADMIN — TOPIRAMATE 50 MG: 25 TABLET, FILM COATED ORAL at 10:10

## 2018-01-01 RX ADMIN — ACETAMINOPHEN 1000 MG: 500 TABLET, FILM COATED ORAL at 11:11

## 2018-01-01 RX ADMIN — OYSTER SHELL CALCIUM WITH VITAMIN D 1 TABLET: 500; 200 TABLET, FILM COATED ORAL at 05:12

## 2018-01-01 RX ADMIN — TIZANIDINE 8 MG: 4 TABLET ORAL at 06:12

## 2018-01-01 RX ADMIN — DIPHENHYDRAMINE HYDROCHLORIDE 50 MG: 50 INJECTION, SOLUTION INTRAMUSCULAR; INTRAVENOUS at 05:09

## 2018-01-01 RX ADMIN — SODIUM CHLORIDE, PRESERVATIVE FREE 500 UNITS: 5 INJECTION INTRAVENOUS at 10:09

## 2018-01-01 RX ADMIN — MONTELUKAST SODIUM 10 MG: 10 TABLET, FILM COATED ORAL at 03:10

## 2018-01-01 RX ADMIN — CEFEPIME 1 G: 1 INJECTION, POWDER, FOR SOLUTION INTRAMUSCULAR; INTRAVENOUS at 10:12

## 2018-01-01 RX ADMIN — ARIPIPRAZOLE 2 MG: 2 TABLET ORAL at 09:11

## 2018-01-01 RX ADMIN — ACYCLOVIR SODIUM 290 MG: 500 INJECTION, SOLUTION INTRAVENOUS at 03:11

## 2018-01-01 RX ADMIN — VANCOMYCIN HYDROCHLORIDE 750 MG: 750 INJECTION, POWDER, LYOPHILIZED, FOR SOLUTION INTRAVENOUS at 10:09

## 2018-01-01 RX ADMIN — ONDANSETRON 8 MG: 2 INJECTION INTRAMUSCULAR; INTRAVENOUS at 10:11

## 2018-01-01 RX ADMIN — OXYCODONE HYDROCHLORIDE 10 MG: 10 TABLET ORAL at 09:11

## 2018-01-01 RX ADMIN — LORAZEPAM 2 MG: 1 TABLET ORAL at 08:12

## 2018-01-01 RX ADMIN — PREDNISONE 5 MG: 5 TABLET ORAL at 03:10

## 2018-01-01 RX ADMIN — LORAZEPAM 1 MG: 1 TABLET ORAL at 05:10

## 2018-01-01 RX ADMIN — VENLAFAXINE HYDROCHLORIDE 37.5 MG: 37.5 CAPSULE, EXTENDED RELEASE ORAL at 12:12

## 2018-01-01 RX ADMIN — DAPSONE 100 MG: 100 TABLET ORAL at 08:10

## 2018-01-01 RX ADMIN — OXYCODONE HYDROCHLORIDE 10 MG: 10 TABLET ORAL at 02:09

## 2018-01-01 RX ADMIN — NYSTATIN 500000 UNITS: 500000 SUSPENSION ORAL at 12:12

## 2018-01-01 RX ADMIN — LEVALBUTEROL HYDROCHLORIDE 0.63 MG: 0.63 SOLUTION RESPIRATORY (INHALATION) at 07:09

## 2018-01-01 RX ADMIN — OXYCODONE HYDROCHLORIDE 10 MG: 10 TABLET ORAL at 07:10

## 2018-01-01 RX ADMIN — LORAZEPAM 2 MG: 1 TABLET ORAL at 05:12

## 2018-01-01 RX ADMIN — OXYCODONE HYDROCHLORIDE 10 MG: 10 TABLET ORAL at 04:12

## 2018-01-01 RX ADMIN — LEVALBUTEROL 1.25 MG: 1.25 SOLUTION, CONCENTRATE RESPIRATORY (INHALATION) at 08:12

## 2018-01-01 RX ADMIN — ONDANSETRON 4 MG: 2 INJECTION INTRAMUSCULAR; INTRAVENOUS at 01:12

## 2018-01-01 RX ADMIN — ONDANSETRON 8 MG: 2 INJECTION INTRAMUSCULAR; INTRAVENOUS at 05:11

## 2018-01-01 RX ADMIN — MYCOPHENOLATE MOFETIL 500 MG: 250 CAPSULE ORAL at 09:09

## 2018-01-01 RX ADMIN — LORAZEPAM 1 MG: 1 TABLET ORAL at 08:10

## 2018-01-01 RX ADMIN — MYCOPHENOLATE MOFETIL 500 MG: 250 CAPSULE ORAL at 08:10

## 2018-01-01 RX ADMIN — FOLIC ACID 1000 MCG: 1 TABLET ORAL at 08:12

## 2018-01-01 RX ADMIN — GUAIFENESIN 600 MG: 600 TABLET, EXTENDED RELEASE ORAL at 08:12

## 2018-01-01 RX ADMIN — ARIPIPRAZOLE 2 MG: 2 TABLET ORAL at 08:10

## 2018-01-01 RX ADMIN — MEROPENEM 2 G: 1 INJECTION, POWDER, FOR SOLUTION INTRAVENOUS at 01:10

## 2018-01-01 RX ADMIN — CALCIUM GLUCONATE 1000 MG: 98 INJECTION, SOLUTION INTRAVENOUS at 05:11

## 2018-01-01 RX ADMIN — NALOXONE HYDROCHLORIDE 0.4 MG: 0.4 INJECTION, SOLUTION INTRAMUSCULAR; INTRAVENOUS; SUBCUTANEOUS at 02:11

## 2018-01-01 RX ADMIN — DEXTROSE MONOHYDRATE 25 G: 25 INJECTION, SOLUTION INTRAVENOUS at 05:11

## 2018-01-01 RX ADMIN — GABAPENTIN 1100 MG: 400 CAPSULE ORAL at 09:10

## 2018-01-01 RX ADMIN — DIPHENHYDRAMINE HYDROCHLORIDE 50 MG: 50 INJECTION, SOLUTION INTRAMUSCULAR; INTRAVENOUS at 07:10

## 2018-01-01 RX ADMIN — TIZANIDINE 8 MG: 2 TABLET ORAL at 10:11

## 2018-01-01 RX ADMIN — DEXTROSE 500 MG: 50 INJECTION, SOLUTION INTRAVENOUS at 01:11

## 2018-01-01 RX ADMIN — OXYCODONE HYDROCHLORIDE 5 MG: 5 TABLET ORAL at 07:11

## 2018-01-01 RX ADMIN — GABAPENTIN 1100 MG: 400 CAPSULE ORAL at 04:09

## 2018-01-01 RX ADMIN — TACROLIMUS 3 MG: 1 CAPSULE ORAL at 08:10

## 2018-01-01 RX ADMIN — OXYCODONE HYDROCHLORIDE 10 MG: 10 TABLET ORAL at 09:09

## 2018-01-01 RX ADMIN — MONTELUKAST SODIUM 10 MG: 10 TABLET, FILM COATED ORAL at 08:12

## 2018-01-01 RX ADMIN — ACETAMINOPHEN 325 MG: 325 TABLET, FILM COATED ORAL at 11:12

## 2018-01-01 RX ADMIN — ARIPIPRAZOLE 2 MG: 2 TABLET ORAL at 08:12

## 2018-01-01 RX ADMIN — DIPHENHYDRAMINE HYDROCHLORIDE 50 MG: 50 INJECTION, SOLUTION INTRAMUSCULAR; INTRAVENOUS at 10:09

## 2018-01-01 RX ADMIN — METOPROLOL TARTRATE 25 MG: 25 TABLET ORAL at 09:11

## 2018-01-01 RX ADMIN — IPRATROPIUM BROMIDE 0.5 MG: 0.5 SOLUTION RESPIRATORY (INHALATION) at 01:12

## 2018-01-01 RX ADMIN — FLUCONAZOLE 200 MG: 200 TABLET ORAL at 09:12

## 2018-01-01 RX ADMIN — MORPHINE SULFATE 15 MG: 15 TABLET, FILM COATED, EXTENDED RELEASE ORAL at 11:10

## 2018-01-01 RX ADMIN — AMLODIPINE BESYLATE 10 MG: 10 TABLET ORAL at 09:12

## 2018-01-01 RX ADMIN — VENLAFAXINE HYDROCHLORIDE 75 MG: 75 CAPSULE, EXTENDED RELEASE ORAL at 09:10

## 2018-01-01 RX ADMIN — GABAPENTIN 1100 MG: 400 CAPSULE ORAL at 02:10

## 2018-01-01 RX ADMIN — METOPROLOL TARTRATE 25 MG: 25 TABLET ORAL at 08:11

## 2018-01-01 RX ADMIN — LORAZEPAM 1 MG: 1 TABLET ORAL at 10:10

## 2018-01-01 RX ADMIN — TACROLIMUS 0.5 MG: 1 CAPSULE ORAL at 09:09

## 2018-01-01 RX ADMIN — MORPHINE SULFATE 15 MG: 15 TABLET, FILM COATED, EXTENDED RELEASE ORAL at 08:10

## 2018-01-01 RX ADMIN — LEVALBUTEROL 1.25 MG: 1.25 SOLUTION, CONCENTRATE RESPIRATORY (INHALATION) at 09:12

## 2018-01-01 RX ADMIN — OYSTER SHELL CALCIUM WITH VITAMIN D 1 TABLET: 500; 200 TABLET, FILM COATED ORAL at 04:09

## 2018-01-01 RX ADMIN — OXYCODONE HYDROCHLORIDE 10 MG: 10 TABLET ORAL at 12:09

## 2018-01-01 RX ADMIN — TACROLIMUS 0.5 MG: 0.5 CAPSULE ORAL at 08:11

## 2018-01-01 RX ADMIN — MORPHINE SULFATE 15 MG: 15 TABLET, FILM COATED, EXTENDED RELEASE ORAL at 04:09

## 2018-01-01 RX ADMIN — TACROLIMUS 0.5 MG: 1 CAPSULE ORAL at 05:10

## 2018-01-01 RX ADMIN — ENOXAPARIN SODIUM 30 MG: 100 INJECTION SUBCUTANEOUS at 04:11

## 2018-01-01 RX ADMIN — VENLAFAXINE HYDROCHLORIDE 150 MG: 75 CAPSULE, EXTENDED RELEASE ORAL at 12:12

## 2018-01-01 RX ADMIN — ARIPIPRAZOLE 2 MG: 2 TABLET ORAL at 08:09

## 2018-01-01 RX ADMIN — Medication 1 TABLET: at 08:09

## 2018-01-01 RX ADMIN — SODIUM CHLORIDE: 9 INJECTION, SOLUTION INTRAVENOUS at 07:10

## 2018-01-01 RX ADMIN — LEVALBUTEROL HYDROCHLORIDE 0.63 MG: 0.63 SOLUTION RESPIRATORY (INHALATION) at 08:09

## 2018-01-01 RX ADMIN — ACETAMINOPHEN 1000 MG: 500 TABLET, FILM COATED ORAL at 03:11

## 2018-01-01 RX ADMIN — ONDANSETRON 8 MG: 2 INJECTION INTRAMUSCULAR; INTRAVENOUS at 04:11

## 2018-01-01 RX ADMIN — MYCOPHENOLATE MOFETIL 500 MG: 250 CAPSULE ORAL at 11:12

## 2018-01-01 RX ADMIN — NYSTATIN 500000 UNITS: 500000 SUSPENSION ORAL at 09:11

## 2018-01-01 RX ADMIN — VENLAFAXINE HYDROCHLORIDE 150 MG: 75 CAPSULE, EXTENDED RELEASE ORAL at 09:12

## 2018-01-01 RX ADMIN — HEPARIN 100 UNITS: 100 SYRINGE at 12:11

## 2018-01-01 RX ADMIN — VENLAFAXINE HYDROCHLORIDE 150 MG: 75 CAPSULE, EXTENDED RELEASE ORAL at 08:12

## 2018-01-01 RX ADMIN — OXYCODONE HYDROCHLORIDE 10 MG: 10 TABLET ORAL at 12:11

## 2018-01-01 RX ADMIN — DIPHENHYDRAMINE HYDROCHLORIDE 50 MG: 50 INJECTION, SOLUTION INTRAMUSCULAR; INTRAVENOUS at 08:12

## 2018-01-01 RX ADMIN — ARIPIPRAZOLE 2 MG: 2 TABLET ORAL at 10:11

## 2018-01-01 RX ADMIN — OXYCODONE HYDROCHLORIDE 10 MG: 10 TABLET ORAL at 11:12

## 2018-01-01 RX ADMIN — DEXAMETHASONE SODIUM PHOSPHATE 4 MG: 4 INJECTION, SOLUTION INTRAMUSCULAR; INTRAVENOUS at 01:12

## 2018-01-01 RX ADMIN — PROMETHAZINE HYDROCHLORIDE 25 MG: 25 INJECTION INTRAMUSCULAR; INTRAVENOUS at 07:11

## 2018-01-01 RX ADMIN — DIPHENHYDRAMINE HYDROCHLORIDE 50 MG: 50 INJECTION, SOLUTION INTRAMUSCULAR; INTRAVENOUS at 08:10

## 2018-01-01 RX ADMIN — FLUTICASONE PROPIONATE 100 MCG: 50 SPRAY, METERED NASAL at 08:09

## 2018-01-01 RX ADMIN — NALOXONE HYDROCHLORIDE 0.4 MG/HR: 1 INJECTION PARENTERAL at 12:11

## 2018-01-01 RX ADMIN — FLUTICASONE FUROATE AND VILANTEROL TRIFENATATE 1 PUFF: 100; 25 POWDER RESPIRATORY (INHALATION) at 08:12

## 2018-01-01 RX ADMIN — BUTALBITAL, ACETAMINOPHEN AND CAFFEINE 1 TABLET: 50; 325; 40 TABLET ORAL at 10:12

## 2018-01-01 RX ADMIN — SODIUM CHLORIDE, PRESERVATIVE FREE 500 UNITS: 5 INJECTION INTRAVENOUS at 06:09

## 2018-01-01 RX ADMIN — GABAPENTIN 1100 MG: 400 CAPSULE ORAL at 03:10

## 2018-01-01 RX ADMIN — GABAPENTIN 1100 MG: 400 CAPSULE ORAL at 08:09

## 2018-01-01 RX ADMIN — OSELTAMIVIR PHOSPHATE 75 MG: 75 CAPSULE ORAL at 11:12

## 2018-01-01 RX ADMIN — Medication 180 MG: at 03:10

## 2018-01-01 RX ADMIN — TOPIRAMATE 50 MG: 25 TABLET, FILM COATED ORAL at 09:10

## 2018-01-01 RX ADMIN — BUTALBITAL, ACETAMINOPHEN AND CAFFEINE 1 TABLET: 50; 325; 40 TABLET ORAL at 07:12

## 2018-01-01 RX ADMIN — METHYLPREDNISOLONE SODIUM SUCCINATE 10 MG: 40 INJECTION, POWDER, FOR SOLUTION INTRAMUSCULAR; INTRAVENOUS at 08:11

## 2018-01-01 RX ADMIN — OXYCODONE HYDROCHLORIDE 10 MG: 10 TABLET ORAL at 04:11

## 2018-01-01 RX ADMIN — LEVALBUTEROL HYDROCHLORIDE 0.63 MG: 0.63 SOLUTION RESPIRATORY (INHALATION) at 09:12

## 2018-01-01 RX ADMIN — LORAZEPAM 2 MG: 1 TABLET ORAL at 03:12

## 2018-01-01 RX ADMIN — PROPOFOL 30 MG: 10 INJECTION, EMULSION INTRAVENOUS at 12:10

## 2018-01-01 RX ADMIN — GABAPENTIN 1100 MG: 400 CAPSULE ORAL at 09:09

## 2018-01-01 RX ADMIN — NYSTATIN 500000 UNITS: 500000 SUSPENSION ORAL at 05:12

## 2018-01-01 RX ADMIN — MYCOPHENOLATE MOFETIL 500 MG: 250 CAPSULE ORAL at 10:10

## 2018-01-01 RX ADMIN — LEVALBUTEROL 1.25 MG: 1.25 SOLUTION, CONCENTRATE RESPIRATORY (INHALATION) at 06:12

## 2018-01-01 RX ADMIN — TACROLIMUS 1.5 MG: 1 CAPSULE ORAL at 05:12

## 2018-01-01 RX ADMIN — PREDNISONE 5 MG: 5 TABLET ORAL at 08:10

## 2018-01-01 RX ADMIN — ONDANSETRON 8 MG: 2 INJECTION INTRAMUSCULAR; INTRAVENOUS at 02:11

## 2018-01-01 RX ADMIN — OYSTER SHELL CALCIUM WITH VITAMIN D 1 TABLET: 500; 200 TABLET, FILM COATED ORAL at 05:10

## 2018-01-01 RX ADMIN — PANCRELIPASE 5 CAPSULE: 24000; 76000; 120000 CAPSULE, DELAYED RELEASE PELLETS ORAL at 07:12

## 2018-01-01 RX ADMIN — TIZANIDINE 8 MG: 4 TABLET ORAL at 01:12

## 2018-01-01 RX ADMIN — MORPHINE SULFATE 15 MG: 15 TABLET, FILM COATED, EXTENDED RELEASE ORAL at 12:09

## 2018-01-01 RX ADMIN — AMLODIPINE BESYLATE 10 MG: 10 TABLET ORAL at 08:10

## 2018-01-01 RX ADMIN — TACROLIMUS 1 MG: 1 CAPSULE ORAL at 11:11

## 2018-01-01 RX ADMIN — OXYCODONE HYDROCHLORIDE 10 MG: 10 TABLET ORAL at 11:09

## 2018-01-01 RX ADMIN — HEPARIN SODIUM 5000 UNITS: 5000 INJECTION, SOLUTION INTRAVENOUS; SUBCUTANEOUS at 05:11

## 2018-01-01 RX ADMIN — SODIUM CHLORIDE, SODIUM LACTATE, POTASSIUM CHLORIDE, AND CALCIUM CHLORIDE 1000 ML: .6; .31; .03; .02 INJECTION, SOLUTION INTRAVENOUS at 04:11

## 2018-01-01 RX ADMIN — MORPHINE SULFATE 15 MG: 15 TABLET, EXTENDED RELEASE ORAL at 08:11

## 2018-01-01 RX ADMIN — FLUCONAZOLE 150 MG: 150 TABLET ORAL at 12:12

## 2018-01-01 RX ADMIN — TACROLIMUS 1.5 MG: 1 CAPSULE ORAL at 11:12

## 2018-01-01 RX ADMIN — OYSTER SHELL CALCIUM WITH VITAMIN D 1 TABLET: 500; 200 TABLET, FILM COATED ORAL at 04:10

## 2018-01-01 RX ADMIN — PANTOPRAZOLE SODIUM 40 MG: 40 TABLET, DELAYED RELEASE ORAL at 08:12

## 2018-01-01 RX ADMIN — VENLAFAXINE HYDROCHLORIDE 75 MG: 75 CAPSULE, EXTENDED RELEASE ORAL at 09:09

## 2018-01-01 RX ADMIN — MAGNESIUM OXIDE TAB 400 MG (241.3 MG ELEMENTAL MG) 400 MG: 400 (241.3 MG) TAB at 08:09

## 2018-01-01 RX ADMIN — NYSTATIN 500000 UNITS: 500000 SUSPENSION ORAL at 04:11

## 2018-01-01 RX ADMIN — ACETAMINOPHEN 650 MG: 325 TABLET, FILM COATED ORAL at 08:12

## 2018-01-01 RX ADMIN — TIZANIDINE 8 MG: 4 TABLET ORAL at 12:12

## 2018-01-01 RX ADMIN — ACYCLOVIR SODIUM 290 MG: 500 INJECTION, SOLUTION INTRAVENOUS at 09:11

## 2018-01-01 RX ADMIN — PANTOPRAZOLE SODIUM 40 MG: 40 TABLET, DELAYED RELEASE ORAL at 09:11

## 2018-01-01 RX ADMIN — DIPHENHYDRAMINE HYDROCHLORIDE 50 MG: 50 INJECTION, SOLUTION INTRAMUSCULAR; INTRAVENOUS at 11:11

## 2018-01-01 RX ADMIN — PANTOPRAZOLE SODIUM 40 MG: 40 INJECTION, POWDER, LYOPHILIZED, FOR SOLUTION INTRAVENOUS at 08:11

## 2018-01-01 RX ADMIN — LORAZEPAM 2 MG: 1 TABLET ORAL at 06:11

## 2018-01-01 RX ADMIN — VENLAFAXINE HYDROCHLORIDE 75 MG: 75 CAPSULE, EXTENDED RELEASE ORAL at 08:10

## 2018-01-01 RX ADMIN — LEVALBUTEROL HYDROCHLORIDE 0.63 MG: 0.63 SOLUTION RESPIRATORY (INHALATION) at 09:10

## 2018-01-01 RX ADMIN — DEXTROSE 500 MG: 50 INJECTION, SOLUTION INTRAVENOUS at 09:11

## 2018-01-01 RX ADMIN — SIMETHICONE CHEW TAB 80 MG 80 MG: 80 TABLET ORAL at 10:11

## 2018-01-01 RX ADMIN — VANCOMYCIN HYDROCHLORIDE 750 MG: 750 INJECTION, POWDER, LYOPHILIZED, FOR SOLUTION INTRAVENOUS at 08:09

## 2018-01-01 RX ADMIN — VANCOMYCIN HYDROCHLORIDE 750 MG: 750 INJECTION, POWDER, LYOPHILIZED, FOR SOLUTION INTRAVENOUS at 08:10

## 2018-01-01 RX ADMIN — CEFEPIME 1 G: 1 INJECTION, POWDER, FOR SOLUTION INTRAMUSCULAR; INTRAVENOUS at 03:12

## 2018-01-01 RX ADMIN — NYSTATIN 500000 UNITS: 500000 SUSPENSION ORAL at 08:12

## 2018-01-01 RX ADMIN — DIPHENHYDRAMINE HYDROCHLORIDE 50 MG: 50 INJECTION, SOLUTION INTRAMUSCULAR; INTRAVENOUS at 03:09

## 2018-01-01 RX ADMIN — MEROPENEM 2 G: 1 INJECTION, POWDER, FOR SOLUTION INTRAVENOUS at 12:09

## 2018-01-01 RX ADMIN — MEROPENEM 2 G: 1 INJECTION, POWDER, FOR SOLUTION INTRAVENOUS at 02:10

## 2018-01-01 RX ADMIN — INSULIN ASPART 2 UNITS: 100 INJECTION, SOLUTION INTRAVENOUS; SUBCUTANEOUS at 11:10

## 2018-01-01 RX ADMIN — POTASSIUM CHLORIDE 10 MEQ: 750 CAPSULE, EXTENDED RELEASE ORAL at 03:11

## 2018-01-01 RX ADMIN — PANCRELIPASE 5 CAPSULE: 24000; 76000; 120000 CAPSULE, DELAYED RELEASE PELLETS ORAL at 03:12

## 2018-01-01 RX ADMIN — GABAPENTIN 300 MG: 300 CAPSULE ORAL at 03:12

## 2018-01-01 RX ADMIN — IPRATROPIUM BROMIDE 0.5 MG: 0.5 SOLUTION RESPIRATORY (INHALATION) at 07:12

## 2018-01-01 RX ADMIN — ENOXAPARIN SODIUM 30 MG: 100 INJECTION SUBCUTANEOUS at 05:11

## 2018-01-01 RX ADMIN — LORAZEPAM 2 MG: 1 TABLET ORAL at 04:12

## 2018-01-01 RX ADMIN — OXYCODONE HYDROCHLORIDE 5 MG: 5 TABLET ORAL at 11:11

## 2018-01-01 RX ADMIN — SODIUM CHLORIDE, PRESERVATIVE FREE 100 UNITS: 5 INJECTION INTRAVENOUS at 09:12

## 2018-01-01 RX ADMIN — MEROPENEM 2 G: 1 INJECTION, POWDER, FOR SOLUTION INTRAVENOUS at 10:10

## 2018-01-01 RX ADMIN — FLUTICASONE PROPIONATE 50 MCG: 50 SPRAY, METERED NASAL at 11:12

## 2018-01-01 RX ADMIN — MAGNESIUM OXIDE TAB 400 MG (241.3 MG ELEMENTAL MG) 400 MG: 400 (241.3 MG) TAB at 03:10

## 2018-01-01 RX ADMIN — ONDANSETRON 8 MG: 2 INJECTION INTRAMUSCULAR; INTRAVENOUS at 09:11

## 2018-01-01 RX ADMIN — LORAZEPAM 1 MG: 1 TABLET ORAL at 09:10

## 2018-01-01 RX ADMIN — CEFEPIME 1 G: 1 INJECTION, POWDER, FOR SOLUTION INTRAMUSCULAR; INTRAVENOUS at 01:12

## 2018-01-01 RX ADMIN — TACROLIMUS 0.5 MG: 1 CAPSULE ORAL at 08:09

## 2018-01-01 RX ADMIN — VANCOMYCIN HYDROCHLORIDE 750 MG: 750 INJECTION, POWDER, LYOPHILIZED, FOR SOLUTION INTRAVENOUS at 09:11

## 2018-01-01 RX ADMIN — CEFEPIME 1 G: 1 INJECTION, POWDER, FOR SOLUTION INTRAMUSCULAR; INTRAVENOUS at 09:11

## 2018-01-01 RX ADMIN — PROPOFOL 20 MG: 10 INJECTION, EMULSION INTRAVENOUS at 01:10

## 2018-01-01 RX ADMIN — TACROLIMUS 0.5 MG: 0.5 CAPSULE ORAL at 09:11

## 2018-01-01 RX ADMIN — SODIUM CHLORIDE, PRESERVATIVE FREE 100 UNITS: 5 INJECTION INTRAVENOUS at 07:12

## 2018-01-01 RX ADMIN — BUTALBITAL, ACETAMINOPHEN AND CAFFEINE 1 TABLET: 50; 325; 40 TABLET ORAL at 09:12

## 2018-01-01 RX ADMIN — ACETAMINOPHEN 650 MG: 325 TABLET, FILM COATED ORAL at 07:12

## 2018-01-01 RX ADMIN — ACYCLOVIR SODIUM 290 MG: 500 INJECTION, SOLUTION INTRAVENOUS at 08:11

## 2018-01-01 RX ADMIN — VANCOMYCIN HYDROCHLORIDE 750 MG: 750 INJECTION, POWDER, LYOPHILIZED, FOR SOLUTION INTRAVENOUS at 12:12

## 2018-01-01 RX ADMIN — SODIUM CHLORIDE, PRESERVATIVE FREE 100 UNITS: 5 INJECTION INTRAVENOUS at 11:12

## 2018-01-01 RX ADMIN — LEVALBUTEROL HYDROCHLORIDE 0.63 MG: 0.63 SOLUTION RESPIRATORY (INHALATION) at 07:10

## 2018-01-01 RX ADMIN — Medication 180 MG: at 09:09

## 2018-01-01 RX ADMIN — BUTALBITAL, ACETAMINOPHEN AND CAFFEINE 1 TABLET: 50; 325; 40 TABLET ORAL at 12:12

## 2018-01-01 RX ADMIN — POLYETHYLENE GLYCOL 3350 17 G: 17 POWDER, FOR SOLUTION ORAL at 09:12

## 2018-01-01 RX ADMIN — PROPOFOL 200 MCG/KG/MIN: 10 INJECTION, EMULSION INTRAVENOUS at 01:12

## 2018-01-01 RX ADMIN — OXYCODONE HYDROCHLORIDE 10 MG: 10 TABLET ORAL at 03:09

## 2018-01-01 RX ADMIN — TACROLIMUS 2.5 MG: 1 CAPSULE ORAL at 07:12

## 2018-01-01 RX ADMIN — GABAPENTIN 1100 MG: 400 CAPSULE ORAL at 05:09

## 2018-01-01 RX ADMIN — LISINOPRIL 10 MG: 2.5 TABLET ORAL at 08:09

## 2018-01-01 RX ADMIN — TIZANIDINE 8 MG: 4 TABLET ORAL at 02:10

## 2018-01-01 RX ADMIN — ARIPIPRAZOLE 2 MG: 2 TABLET ORAL at 05:09

## 2018-01-01 RX ADMIN — MEROPENEM 2 G: 1 INJECTION, POWDER, FOR SOLUTION INTRAVENOUS at 08:10

## 2018-01-01 RX ADMIN — ACETAMINOPHEN 1000 MG: 500 TABLET, FILM COATED ORAL at 08:11

## 2018-01-01 RX ADMIN — ALTEPLASE 2 MG: 2.2 INJECTION, POWDER, LYOPHILIZED, FOR SOLUTION INTRAVENOUS at 12:12

## 2018-01-01 RX ADMIN — CEFEPIME 1 G: 1 INJECTION, POWDER, FOR SOLUTION INTRAMUSCULAR; INTRAVENOUS at 09:12

## 2018-01-01 RX ADMIN — LORAZEPAM 2 MG: 2 INJECTION INTRAMUSCULAR; INTRAVENOUS at 04:11

## 2018-01-01 RX ADMIN — INSULIN ASPART 5 UNITS: 100 INJECTION, SOLUTION INTRAVENOUS; SUBCUTANEOUS at 11:10

## 2018-01-01 RX ADMIN — LORAZEPAM 1 MG: 1 TABLET ORAL at 04:10

## 2018-01-01 RX ADMIN — MORPHINE SULFATE 15 MG: 15 TABLET, FILM COATED, EXTENDED RELEASE ORAL at 11:09

## 2018-01-01 RX ADMIN — INSULIN ASPART 5 UNITS: 100 INJECTION, SOLUTION INTRAVENOUS; SUBCUTANEOUS at 09:10

## 2018-01-01 RX ADMIN — GABAPENTIN 300 MG: 300 CAPSULE ORAL at 04:12

## 2018-01-01 RX ADMIN — ACETAMINOPHEN 650 MG: 325 TABLET, FILM COATED ORAL at 04:12

## 2018-01-01 RX ADMIN — LEVALBUTEROL 1.25 MG: 1.25 SOLUTION, CONCENTRATE RESPIRATORY (INHALATION) at 07:12

## 2018-01-01 RX ADMIN — Medication 1 TABLET: at 08:10

## 2018-01-01 RX ADMIN — GABAPENTIN 300 MG: 300 CAPSULE ORAL at 08:11

## 2018-01-01 RX ADMIN — SODIUM CHLORIDE: 9 INJECTION, SOLUTION INTRAVENOUS at 01:12

## 2018-01-01 RX ADMIN — LORAZEPAM 2 MG: 1 TABLET ORAL at 02:12

## 2018-01-01 RX ADMIN — LISINOPRIL 10 MG: 2.5 TABLET ORAL at 08:10

## 2018-01-01 RX ADMIN — INSULIN ASPART 10 UNITS: 100 INJECTION, SOLUTION INTRAVENOUS; SUBCUTANEOUS at 05:10

## 2018-01-01 RX ADMIN — ARIPIPRAZOLE 2 MG: 2 TABLET ORAL at 03:10

## 2018-01-01 RX ADMIN — MYCOPHENOLATE MOFETIL 500 MG: 250 CAPSULE ORAL at 09:11

## 2018-01-01 RX ADMIN — DIPHENHYDRAMINE HYDROCHLORIDE 50 MG: 50 INJECTION, SOLUTION INTRAMUSCULAR; INTRAVENOUS at 09:09

## 2018-01-01 RX ADMIN — BUTALBITAL, ACETAMINOPHEN AND CAFFEINE 1 TABLET: 50; 325; 40 TABLET ORAL at 05:12

## 2018-01-01 RX ADMIN — SODIUM CHLORIDE, PRESERVATIVE FREE 500 UNITS: 5 INJECTION INTRAVENOUS at 05:10

## 2018-01-01 RX ADMIN — LORAZEPAM 2 MG: 1 TABLET ORAL at 09:11

## 2018-01-01 RX ADMIN — LORAZEPAM 2 MG: 1 TABLET ORAL at 10:11

## 2018-01-01 RX ADMIN — MYCOPHENOLATE MOFETIL 500 MG: 250 CAPSULE ORAL at 09:10

## 2018-01-01 RX ADMIN — LEVALBUTEROL 1.25 MG: 1.25 SOLUTION, CONCENTRATE RESPIRATORY (INHALATION) at 10:12

## 2018-01-01 RX ADMIN — AMLODIPINE BESYLATE 10 MG: 10 TABLET ORAL at 09:10

## 2018-01-01 RX ADMIN — PREDNISONE 5 MG: 5 TABLET ORAL at 09:09

## 2018-01-01 RX ADMIN — PROPOFOL 150 MCG/KG/MIN: 10 INJECTION, EMULSION INTRAVENOUS at 12:10

## 2018-01-01 RX ADMIN — DIPHENHYDRAMINE HYDROCHLORIDE 50 MG: 50 INJECTION, SOLUTION INTRAMUSCULAR; INTRAVENOUS at 11:12

## 2018-01-01 RX ADMIN — DIPHENHYDRAMINE HYDROCHLORIDE 50 MG: 50 INJECTION, SOLUTION INTRAMUSCULAR; INTRAVENOUS at 10:10

## 2018-01-01 RX ADMIN — PIPERACILLIN AND TAZOBACTAM 4.5 G: 4; .5 INJECTION, POWDER, LYOPHILIZED, FOR SOLUTION INTRAVENOUS; PARENTERAL at 09:11

## 2018-01-01 RX ADMIN — LORAZEPAM 2 MG: 1 TABLET ORAL at 07:12

## 2018-01-01 RX ADMIN — MORPHINE SULFATE 15 MG: 15 TABLET, FILM COATED, EXTENDED RELEASE ORAL at 01:10

## 2018-01-01 RX ADMIN — MAGNESIUM OXIDE TAB 400 MG (241.3 MG ELEMENTAL MG) 400 MG: 400 (241.3 MG) TAB at 10:10

## 2018-01-01 RX ADMIN — OXYCODONE HYDROCHLORIDE 10 MG: 10 TABLET ORAL at 09:10

## 2018-01-01 RX ADMIN — INSULIN ASPART 4 UNITS: 100 INJECTION, SOLUTION INTRAVENOUS; SUBCUTANEOUS at 02:10

## 2018-01-01 RX ADMIN — PANCRELIPASE 5 CAPSULE: 60000; 12000; 38000 CAPSULE, DELAYED RELEASE PELLETS ORAL at 04:11

## 2018-01-01 RX ADMIN — Medication 30 MG: at 12:10

## 2018-01-01 RX ADMIN — MONTELUKAST SODIUM 10 MG: 10 TABLET, FILM COATED ORAL at 09:10

## 2018-01-01 RX ADMIN — TACROLIMUS 0.5 MG: 0.5 CAPSULE ORAL at 10:11

## 2018-01-01 RX ADMIN — DIPHENHYDRAMINE HYDROCHLORIDE 50 MG: 50 INJECTION, SOLUTION INTRAMUSCULAR; INTRAVENOUS at 09:11

## 2018-01-01 RX ADMIN — SODIUM CHLORIDE, PRESERVATIVE FREE 500 UNITS: 5 INJECTION INTRAVENOUS at 04:09

## 2018-01-01 RX ADMIN — POTASSIUM CHLORIDE 40 MEQ: 20 SOLUTION ORAL at 01:11

## 2018-01-01 RX ADMIN — FAMOTIDINE 20 MG: 10 INJECTION, SOLUTION INTRAVENOUS at 09:12

## 2018-01-01 RX ADMIN — CETIRIZINE HYDROCHLORIDE 5 MG: 5 TABLET ORAL at 01:12

## 2018-01-01 RX ADMIN — MORPHINE SULFATE 15 MG: 15 TABLET, EXTENDED RELEASE ORAL at 09:11

## 2018-01-01 RX ADMIN — MEROPENEM 2 G: 1 INJECTION, POWDER, FOR SOLUTION INTRAVENOUS at 05:10

## 2018-01-01 RX ADMIN — PANTOPRAZOLE SODIUM 40 MG: 40 TABLET, DELAYED RELEASE ORAL at 08:11

## 2018-01-01 RX ADMIN — OXYCODONE HYDROCHLORIDE 10 MG: 10 TABLET ORAL at 03:11

## 2018-01-01 RX ADMIN — DIPHENHYDRAMINE HYDROCHLORIDE 50 MG: 50 INJECTION, SOLUTION INTRAMUSCULAR; INTRAVENOUS at 03:10

## 2018-01-01 RX ADMIN — AMLODIPINE BESYLATE 10 MG: 10 TABLET ORAL at 08:09

## 2018-01-01 RX ADMIN — PROMETHAZINE HYDROCHLORIDE 25 MG: 25 INJECTION INTRAMUSCULAR; INTRAVENOUS at 01:11

## 2018-01-01 RX ADMIN — ONDANSETRON 8 MG: 2 INJECTION INTRAMUSCULAR; INTRAVENOUS at 01:11

## 2018-01-01 RX ADMIN — NYSTATIN 500000 UNITS: 500000 SUSPENSION ORAL at 01:11

## 2018-01-01 RX ADMIN — PANCRELIPASE 5 CAPSULE: 24000; 76000; 120000 CAPSULE, DELAYED RELEASE PELLETS ORAL at 01:12

## 2018-01-01 RX ADMIN — LEVALBUTEROL HYDROCHLORIDE 0.63 MG: 0.63 SOLUTION RESPIRATORY (INHALATION) at 03:09

## 2018-01-01 RX ADMIN — TACROLIMUS 3 MG: 1 CAPSULE ORAL at 09:09

## 2018-01-01 RX ADMIN — OXYCODONE HYDROCHLORIDE 5 MG: 5 TABLET ORAL at 12:11

## 2018-01-01 RX ADMIN — OXYCODONE HYDROCHLORIDE 10 MG: 10 TABLET ORAL at 04:09

## 2018-01-01 RX ADMIN — Medication 0.4 MG: at 02:11

## 2018-01-01 RX ADMIN — OSELTAMIVIR PHOSPHATE 75 MG: 75 CAPSULE ORAL at 08:12

## 2018-01-01 RX ADMIN — LORAZEPAM 2 MG: 1 TABLET ORAL at 06:12

## 2018-01-01 RX ADMIN — FLUCONAZOLE 200 MG: 200 TABLET ORAL at 02:12

## 2018-01-01 RX ADMIN — PROMETHAZINE HYDROCHLORIDE 6.25 MG: 25 INJECTION INTRAMUSCULAR; INTRAVENOUS at 03:12

## 2018-01-01 RX ADMIN — VANCOMYCIN HYDROCHLORIDE 750 MG: 750 INJECTION, POWDER, LYOPHILIZED, FOR SOLUTION INTRAVENOUS at 11:12

## 2018-01-01 RX ADMIN — DIPHENHYDRAMINE HYDROCHLORIDE 50 MG: 50 INJECTION, SOLUTION INTRAMUSCULAR; INTRAVENOUS at 10:12

## 2018-01-01 RX ADMIN — CALCIUM GLUCONATE 1000 MG: 98 INJECTION, SOLUTION INTRAVENOUS at 09:11

## 2018-01-01 RX ADMIN — Medication 1 TABLET: at 09:12

## 2018-01-01 RX ADMIN — TOPIRAMATE 25 MG: 25 TABLET, FILM COATED ORAL at 09:11

## 2018-01-01 RX ADMIN — OXYCODONE HYDROCHLORIDE 10 MG: 10 TABLET ORAL at 08:09

## 2018-01-01 RX ADMIN — OXYCODONE HYDROCHLORIDE 10 MG: 10 TABLET ORAL at 08:11

## 2018-01-01 RX ADMIN — DIPHENHYDRAMINE HYDROCHLORIDE 50 MG: 50 INJECTION, SOLUTION INTRAMUSCULAR; INTRAVENOUS at 10:11

## 2018-01-01 RX ADMIN — CEFEPIME 1 G: 1 INJECTION, POWDER, FOR SOLUTION INTRAMUSCULAR; INTRAVENOUS at 08:12

## 2018-01-01 RX ADMIN — PROMETHAZINE HYDROCHLORIDE 12.5 MG: 25 INJECTION INTRAMUSCULAR; INTRAVENOUS at 12:10

## 2018-01-01 RX ADMIN — VANCOMYCIN HYDROCHLORIDE 750 MG: 750 INJECTION, POWDER, LYOPHILIZED, FOR SOLUTION INTRAVENOUS at 10:12

## 2018-01-01 RX ADMIN — SODIUM CHLORIDE: 9 INJECTION, SOLUTION INTRAVENOUS at 09:10

## 2018-01-01 RX ADMIN — FLUTICASONE PROPIONATE 100 MCG: 50 SPRAY, METERED NASAL at 09:10

## 2018-01-01 RX ADMIN — TACROLIMUS 2 MG: 1 CAPSULE ORAL at 08:12

## 2018-01-01 RX ADMIN — SODIUM CHLORIDE, PRESERVATIVE FREE 500 UNITS: 5 INJECTION INTRAVENOUS at 12:10

## 2018-01-01 RX ADMIN — NALOXONE HYDROCHLORIDE 0.4 MG/HR: 1 INJECTION PARENTERAL at 07:11

## 2018-01-01 RX ADMIN — DIPHENHYDRAMINE HYDROCHLORIDE 50 MG: 50 INJECTION, SOLUTION INTRAMUSCULAR; INTRAVENOUS at 06:11

## 2018-01-01 RX ADMIN — LORAZEPAM 1 MG: 1 TABLET ORAL at 04:09

## 2018-01-01 RX ADMIN — VENLAFAXINE HYDROCHLORIDE 75 MG: 75 CAPSULE, EXTENDED RELEASE ORAL at 08:09

## 2018-01-01 RX ADMIN — PANCRELIPASE 5 CAPSULE: 60000; 12000; 38000 CAPSULE, DELAYED RELEASE PELLETS ORAL at 05:11

## 2018-01-01 RX ADMIN — Medication 180 MG: at 08:10

## 2018-01-01 RX ADMIN — BUTALBITAL, ACETAMINOPHEN AND CAFFEINE 1 TABLET: 50; 325; 40 TABLET ORAL at 06:12

## 2018-01-01 RX ADMIN — GUAIFENESIN 600 MG: 600 TABLET, EXTENDED RELEASE ORAL at 07:12

## 2018-01-01 RX ADMIN — MEROPENEM 2 G: 1 INJECTION, POWDER, FOR SOLUTION INTRAVENOUS at 01:09

## 2018-01-01 RX ADMIN — ACETAMINOPHEN 1000 MG: 500 TABLET, FILM COATED ORAL at 05:11

## 2018-01-01 RX ADMIN — POTASSIUM PHOSPHATE, MONOBASIC AND POTASSIUM PHOSPHATE, DIBASIC 20 MMOL: 224; 236 INJECTION, SOLUTION, CONCENTRATE INTRAVENOUS at 05:11

## 2018-01-01 RX ADMIN — ACYCLOVIR SODIUM 290 MG: 500 INJECTION, SOLUTION INTRAVENOUS at 10:11

## 2018-01-01 RX ADMIN — ACETAMINOPHEN 500 MG: 500 TABLET, FILM COATED ORAL at 12:12

## 2018-01-01 RX ADMIN — SODIUM CHLORIDE: 0.9 INJECTION, SOLUTION INTRAVENOUS at 03:11

## 2018-01-01 RX ADMIN — PREDNISONE 5 MG: 5 TABLET ORAL at 04:09

## 2018-01-01 RX ADMIN — DEXAMETHASONE SODIUM PHOSPHATE 4 MG: 4 INJECTION, SOLUTION INTRAMUSCULAR; INTRAVENOUS at 12:10

## 2018-01-01 RX ADMIN — OXYCODONE HYDROCHLORIDE 10 MG: 10 TABLET ORAL at 10:11

## 2018-01-01 RX ADMIN — SODIUM CHLORIDE, PRESERVATIVE FREE 100 UNITS: 5 INJECTION INTRAVENOUS at 01:12

## 2018-01-01 RX ADMIN — NYSTATIN 500000 UNITS: 500000 SUSPENSION ORAL at 03:11

## 2018-01-01 RX ADMIN — TIZANIDINE 8 MG: 4 TABLET ORAL at 09:12

## 2018-01-01 RX ADMIN — PANTOPRAZOLE SODIUM 40 MG: 40 INJECTION, POWDER, LYOPHILIZED, FOR SOLUTION INTRAVENOUS at 10:11

## 2018-01-01 RX ADMIN — LEVALBUTEROL 1.25 MG: 1.25 SOLUTION, CONCENTRATE RESPIRATORY (INHALATION) at 04:12

## 2018-01-01 RX ADMIN — POTASSIUM CHLORIDE 10 MEQ: 750 CAPSULE, EXTENDED RELEASE ORAL at 08:11

## 2018-01-01 RX ADMIN — HEPARIN 100 UNITS: 100 SYRINGE at 03:11

## 2018-01-01 RX ADMIN — CEFEPIME 1 G: 1 INJECTION, POWDER, FOR SOLUTION INTRAMUSCULAR; INTRAVENOUS at 03:11

## 2018-01-01 RX ADMIN — DAPSONE 100 MG: 100 TABLET ORAL at 08:09

## 2018-01-01 RX ADMIN — TACROLIMUS 2.5 MG: 1 CAPSULE ORAL at 09:12

## 2018-01-01 RX ADMIN — TOPIRAMATE 25 MG: 25 TABLET, FILM COATED ORAL at 12:12

## 2018-01-01 RX ADMIN — MIDAZOLAM HYDROCHLORIDE 2 MG: 1 INJECTION, SOLUTION INTRAMUSCULAR; INTRAVENOUS at 12:10

## 2018-01-01 RX ADMIN — LORAZEPAM 2 MG: 2 INJECTION INTRAMUSCULAR; INTRAVENOUS at 01:12

## 2018-01-01 RX ADMIN — TIZANIDINE 8 MG: 4 TABLET ORAL at 04:12

## 2018-01-01 RX ADMIN — PANCRELIPASE 5 CAPSULE: 60000; 12000; 38000 CAPSULE, DELAYED RELEASE PELLETS ORAL at 09:11

## 2018-01-01 RX ADMIN — OYSTER SHELL CALCIUM WITH VITAMIN D 1 TABLET: 500; 200 TABLET, FILM COATED ORAL at 04:12

## 2018-01-01 RX ADMIN — FENTANYL CITRATE 100 MCG: 50 INJECTION, SOLUTION INTRAMUSCULAR; INTRAVENOUS at 12:10

## 2018-01-01 RX ADMIN — LEVALBUTEROL HYDROCHLORIDE 0.63 MG: 0.63 SOLUTION RESPIRATORY (INHALATION) at 04:09

## 2018-01-01 RX ADMIN — NYSTATIN 500000 UNITS: 500000 SUSPENSION ORAL at 01:12

## 2018-01-01 RX ADMIN — MEROPENEM 2 G: 1 INJECTION, POWDER, FOR SOLUTION INTRAVENOUS at 06:10

## 2018-01-01 RX ADMIN — CEFEPIME 1 G: 1 INJECTION, POWDER, FOR SOLUTION INTRAMUSCULAR; INTRAVENOUS at 07:12

## 2018-01-01 RX ADMIN — PROPOFOL 100 MG: 10 INJECTION, EMULSION INTRAVENOUS at 12:10

## 2018-01-01 RX ADMIN — ACYCLOVIR SODIUM 290 MG: 500 INJECTION, SOLUTION INTRAVENOUS at 11:11

## 2018-01-01 RX ADMIN — KETOROLAC TROMETHAMINE 15 MG: 15 INJECTION, SOLUTION INTRAMUSCULAR; INTRAVENOUS at 01:12

## 2018-01-01 RX ADMIN — VANCOMYCIN HYDROCHLORIDE 750 MG: 750 INJECTION, POWDER, LYOPHILIZED, FOR SOLUTION INTRAVENOUS at 10:10

## 2018-01-01 RX ADMIN — POTASSIUM CHLORIDE 20 MEQ: 1500 TABLET, EXTENDED RELEASE ORAL at 09:11

## 2018-01-01 RX ADMIN — GUAIFENESIN 600 MG: 600 TABLET, EXTENDED RELEASE ORAL at 08:09

## 2018-01-01 RX ADMIN — OYSTER SHELL CALCIUM WITH VITAMIN D 1 TABLET: 500; 200 TABLET, FILM COATED ORAL at 08:09

## 2018-01-01 RX ADMIN — OXYCODONE HYDROCHLORIDE 10 MG: 10 TABLET ORAL at 12:12

## 2018-01-01 RX ADMIN — MEROPENEM 2 G: 1 INJECTION, POWDER, FOR SOLUTION INTRAVENOUS at 09:10

## 2018-01-01 RX ADMIN — LORAZEPAM 1 MG: 1 TABLET ORAL at 11:10

## 2018-01-01 RX ADMIN — BUTALBITAL, ACETAMINOPHEN AND CAFFEINE 1 TABLET: 50; 325; 40 TABLET ORAL at 11:12

## 2018-01-01 RX ADMIN — LORAZEPAM 1 MG: 1 TABLET ORAL at 09:09

## 2018-01-01 RX ADMIN — PIPERACILLIN AND TAZOBACTAM 4.5 G: 4; .5 INJECTION, POWDER, LYOPHILIZED, FOR SOLUTION INTRAVENOUS; PARENTERAL at 08:11

## 2018-01-01 RX ADMIN — NYSTATIN 500000 UNITS: 500000 SUSPENSION ORAL at 02:11

## 2018-01-01 RX ADMIN — GABAPENTIN 300 MG: 300 CAPSULE ORAL at 01:12

## 2018-01-01 RX ADMIN — ONDANSETRON 8 MG: 4 TABLET, FILM COATED ORAL at 04:12

## 2018-01-01 RX ADMIN — PREDNISONE 5 MG: 5 TABLET ORAL at 08:09

## 2018-01-01 RX ADMIN — DIPHENHYDRAMINE HYDROCHLORIDE 50 MG: 50 INJECTION, SOLUTION INTRAMUSCULAR; INTRAVENOUS at 04:11

## 2018-01-01 RX ADMIN — TOPIRAMATE 50 MG: 25 TABLET, FILM COATED ORAL at 03:10

## 2018-01-01 RX ADMIN — PREDNISONE 5 MG: 5 TABLET ORAL at 08:11

## 2018-01-15 ENCOUNTER — HOSPITAL ENCOUNTER (OUTPATIENT)
Dept: RADIOLOGY | Facility: HOSPITAL | Age: 28
Discharge: HOME | End: 2018-01-15
Attending: INTERNAL MEDICINE
Payer: MEDICAID

## 2018-01-15 ENCOUNTER — OFFICE VISIT (OUTPATIENT)
Dept: TRANSPLANT | Facility: CLINIC | Age: 28
End: 2018-01-15
Payer: MEDICAID

## 2018-01-15 ENCOUNTER — HOSPITAL ENCOUNTER (OUTPATIENT)
Dept: PULMONOLOGY | Facility: CLINIC | Age: 28
Discharge: HOME | End: 2018-01-15
Payer: MEDICAID

## 2018-01-15 VITALS
SYSTOLIC BLOOD PRESSURE: 137 MMHG | HEART RATE: 109 BPM | HEIGHT: 61 IN | DIASTOLIC BLOOD PRESSURE: 85 MMHG | WEIGHT: 128 LBS | BODY MASS INDEX: 24.17 KG/M2 | TEMPERATURE: 98 F | OXYGEN SATURATION: 97 % | RESPIRATION RATE: 20 BRPM

## 2018-01-15 DIAGNOSIS — D84.9 IMMUNOSUPPRESSION: ICD-10-CM

## 2018-01-15 DIAGNOSIS — Z94.2 LUNG REPLACED BY TRANSPLANT: ICD-10-CM

## 2018-01-15 DIAGNOSIS — Z48.24 ENCOUNTER FOR AFTERCARE FOLLOWING LUNG TRANSPLANT: Primary | ICD-10-CM

## 2018-01-15 DIAGNOSIS — J44.81 BRONCHIOLITIS OBLITERANS: ICD-10-CM

## 2018-01-15 DIAGNOSIS — Z79.2 PROPHYLACTIC ANTIBIOTIC: ICD-10-CM

## 2018-01-15 DIAGNOSIS — E84.19 CYSTIC FIBROSIS WITH GASTROINTESTINAL MANIFESTATIONS: ICD-10-CM

## 2018-01-15 LAB
PRE FEV1 FVC: 41
PRE FEV1: 0.9
PRE FVC: 2.2
PREDICTED FEV1 FVC: 88
PREDICTED FEV1: 2.82
PREDICTED FVC: 3.24

## 2018-01-15 PROCEDURE — 94010 BREATHING CAPACITY TEST: CPT | Mod: 26,S$PBB,, | Performed by: INTERNAL MEDICINE

## 2018-01-15 PROCEDURE — 71046 X-RAY EXAM CHEST 2 VIEWS: CPT | Mod: TC,FY

## 2018-01-15 PROCEDURE — 99213 OFFICE O/P EST LOW 20 MIN: CPT | Mod: PBBFAC | Performed by: INTERNAL MEDICINE

## 2018-01-15 PROCEDURE — 94010 BREATHING CAPACITY TEST: CPT | Mod: PBBFAC | Performed by: INTERNAL MEDICINE

## 2018-01-15 PROCEDURE — 99214 OFFICE O/P EST MOD 30 MIN: CPT | Mod: 25,S$PBB,, | Performed by: INTERNAL MEDICINE

## 2018-01-15 PROCEDURE — 71046 X-RAY EXAM CHEST 2 VIEWS: CPT | Mod: 26,,, | Performed by: RADIOLOGY

## 2018-01-15 PROCEDURE — 99999 PR PBB SHADOW E&M-EST. PATIENT-LVL III: CPT | Mod: PBBFAC,,, | Performed by: INTERNAL MEDICINE

## 2018-01-15 RX ORDER — MIRTAZAPINE 30 MG/1
30 TABLET, FILM COATED ORAL NIGHTLY
COMMUNITY
End: 2018-01-01 | Stop reason: ALTCHOICE

## 2018-01-15 NOTE — PROGRESS NOTES
LUNG TRANSPLANT RECIPIENT FOLLOW-UP    Reason for Visit: Follow-up after lung transplantation.      Date of Transplant: 6/12/14     Reason for Transplant: Cystic fibrosis      Type of Transplant: bilateral sequential lung     CMV Status: D+ / R-      Major Complications:   1. Vocal cord dysfunction- resolved   2. A1 Rejection 8/2014   3. C glabrata positive sputum-recurrent  4. Pseudomonas pneumonia in 02/2015 resolved   5. 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 CARLOS EDUARDO (grade 3)                                                                               History of Present Illness: Juanita Ibarra is a 27 y.o. year old female with the above listed transplant history who presents today for routine follow-up.  Since her last visit, her dyspnea remains stable and present only on heavy exertion.  She does not require supplemental oxygen.  Denies any recent illnesses or sick contacts.  Was hospitalized in November 2017 and received 14 days of Vanc/Merrem.  She otherwise feels well.  She recently got engaged and is planning to travel this year.      Review of Systems   Constitutional: Negative for chills, diaphoresis, fever, malaise/fatigue and weight loss.        Weight gain   HENT: Positive for congestion. Negative for ear discharge, ear pain, hearing loss, nosebleeds and sore throat.    Eyes: Negative for blurred vision, double vision, photophobia and pain.   Respiratory: Negative for cough, hemoptysis, sputum production, shortness of breath, wheezing and stridor.    Cardiovascular: Negative for chest pain, palpitations, orthopnea, claudication, leg swelling and PND.   Gastrointestinal: Negative for abdominal pain, blood in stool, constipation, diarrhea, heartburn, melena, nausea and vomiting.   Genitourinary: Negative for dysuria, flank pain, frequency, hematuria and urgency.   Musculoskeletal: Negative for back pain, falls, joint pain, myalgias and neck pain.   Skin: Negative for itching and rash.   Neurological: Negative for  "dizziness, tingling, tremors, sensory change, focal weakness, seizures, loss of consciousness, weakness and headaches.   Endo/Heme/Allergies: Does not bruise/bleed easily.   Psychiatric/Behavioral: Negative for depression, hallucinations, memory loss and suicidal ideas. The patient is not nervous/anxious and does not have insomnia.      /85 (BP Location: Right arm, Patient Position: Sitting, BP Method: Medium (Automatic))   Pulse 109   Temp 98.2 °F (36.8 °C) (Oral)   Resp 20   Ht 5' 1" (1.549 m)   Wt 58.1 kg (128 lb)   LMP 10/02/2016   SpO2 97%   BMI 24.19 kg/m²     Physical Exam   Constitutional: She is oriented to person, place, and time and well-developed, well-nourished, and in no distress. No distress.   HENT:   Head: Normocephalic and atraumatic.   Nose: Nose normal.   Mouth/Throat: Oropharynx is clear and moist. No oropharyngeal exudate.   Nasal voice   Eyes: Conjunctivae and EOM are normal. Pupils are equal, round, and reactive to light. No scleral icterus.   Neck: Normal range of motion. Neck supple. No JVD present. No tracheal deviation present. No thyromegaly present.   Cardiovascular: Normal rate, regular rhythm and intact distal pulses.  Exam reveals no gallop and no friction rub.    No murmur heard.  Pulmonary/Chest: Effort normal and breath sounds normal. No stridor. No respiratory distress. She has no wheezes. She has no rales. She exhibits no tenderness.   Abdominal: Soft. Bowel sounds are normal. She exhibits no distension and no mass. There is no tenderness. There is no rebound and no guarding.   Musculoskeletal: Normal range of motion. She exhibits no edema.   Lymphadenopathy:     She has no cervical adenopathy.   Neurological: She is alert and oriented to person, place, and time. No cranial nerve deficit. Gait normal. Coordination normal. GCS score is 15.   Skin: Skin is warm and dry. No rash noted. She is not diaphoretic. No erythema. No pallor.   Psychiatric: Mood and affect " normal.     Labs:  cbc, cmp, tacrolimus Latest Ref Rng & Units 9/25/2017 11/13/2017 1/15/2018   TACROLIMUS LVL 5.0 - 15.0 ng/mL 9.1 6.5 12.2   WHITE BLOOD CELL COUNT 3.90 - 12.70 K/uL 6.76 7.98 7.22   RBC 4.00 - 5.40 M/uL 4.62 4.59 4.45   HEMOGLOBIN 12.0 - 16.0 g/dL 11.3(L) 11.6(L) 11.2(L)   HEMATOCRIT 37.0 - 48.5 % 37.6 38.3 36.8(L)   MCV 82 - 98 fL 81(L) 83 83   MCH 27.0 - 31.0 pg 24.5(L) 25.3(L) 25.2(L)   MCHC 32.0 - 36.0 g/dL 30.1(L) 30.3(L) 30.4(L)   RDW 11.5 - 14.5 % 16.8(H) 15.5(H) 16.6(H)   PLATELETS 150 - 350 K/uL 173 169 174   MPV 9.2 - 12.9 fL 10.5 10.9 10.7   GRAN # 1.8 - 7.7 K/uL 3.2 4.4 3.9   LYMPH # 1.0 - 4.8 K/uL 3.0 2.8 2.6   MONO # 0.3 - 1.0 K/uL 0.4 0.6 0.4   EOSINOPHIL% 0.0 - 8.0 % 2.8 2.0 2.2   BASOPHIL% 0.0 - 1.9 % 0.3 0.4 0.3   DIFFERENTIAL METHOD - Automated Automated Automated   SODIUM 136 - 145 mmol/L 142 139 140   POTASSIUM 3.5 - 5.1 mmol/L 4.0 3.8 4.3   CHLORIDE 95 - 110 mmol/L 109 105 106   CO2 23 - 29 mmol/L 24 23 26   GLUCOSE 70 - 110 mg/dL 119(H) 106 179(H)   BUN BLD 6 - 20 mg/dL 17 13 11   CREATININE 0.5 - 1.4 mg/dL 1.0 1.0 1.0   CALCIUM 8.7 - 10.5 mg/dL 9.5 8.9 9.2   PROTEIN TOTAL 6.0 - 8.4 g/dL 7.5 7.6 7.3   ALBUMIN 3.5 - 5.2 g/dL 3.6 3.6 3.5   BILIRUBIN TOTAL 0.1 - 1.0 mg/dL 0.3 0.3 0.4   ALK PHOS 55 - 135 U/L 293(H) 158(H) 158(H)   AST 10 - 40 U/L 27 18 14   ALT 10 - 44 U/L 56(H) 23 19   ANION GAP 8 - 16 mmol/L 9 11 8   EGFR IF AFRICAN AMERICAN >60 mL/min/1.73 m:2 >60.0 >60.0 >60.0   EGFR IF NON-AFRICAN AMERICAN >60 mL/min/1.73 m:2 >60.0 >60.0 >60.0     Pulmonary Function Tests 1/15/2018 11/13/2017 9/25/2017 7/25/2017 6/29/2017 3/30/2017 3/6/2017   FVC 2.2 2.03 2.3 2.36 2.2 2.35 2.46   FEV1 0.9 0.84 0.88 0.94 0.88 0.93 0.95   TLC (liters) - - - - - - -   DLCO (ml/mmHg sec) - - - - - - -   FVC% 68 63 71 73 68 73 76   FEV1% 32 30 31 33 31 33 34   FEF 25-75 0.39 0.33 0.35 0.36 0.36 0.33 0.33   FEF 25-75% 12 10 10 11 11 10 10   TLC% - - - - - - -   RV - - - - - - -   RV% - - - - - -  -   DLCO% - - - - - - -       Imaging:  Results for orders placed during the hospital encounter of 01/15/18   X-Ray Chest PA And Lateral    Narrative Time of Procedure: 01/15/18 08:20:41  Accession # [id number]    Comparison: 11/13/2017.  3/30/2017.    Number of views: 2.     Findings:  Lung volumes are normal and symmetric.  Mediastinal structures are midline.  Sternal sutures are intact and in their usual position.  Port in the anterior chest wall is attached to a catheter which extends to the superior vena cava.    Calcific opacity again noted overlying the inferior RIGHT mediastinum; report of the chest radiograph performed 3/30/2017 describes it also appearing on CTs dating back to 10/2013; the clinical significance remains unclear.    I detect no convincing evidence of pulmonary disease, pleural fluid, lymph node enlargement, cardiac decompensation, pneumothorax, pneumomediastinum, pneumoperitoneum or significant osseous abnormality.    Impression Stable radiographic appearance of the chest in this patient status post bilateral sequential lung transplant.      Electronically signed by: Adela Davalos MD  Date:     01/15/18  Time:    09:10        Assessment:  1. Encounter for aftercare following lung transplant    2. Lung replaced by transplant    3. Immunosuppression    4. Prophylactic antibiotic    5. Bronchiolitis obliterans    6. Cystic fibrosis with gastrointestinal manifestations      Plan:   1. She has CARLOS EDUARDO grade 3 which has been stable since last visit.  Her FEV1 and symptoms remain stable.  CXR with hyperinflation but otherwise no acute changes.  She does not require supplemental oxygen.  Will continue to monitor at regularly scheduled visits.    2. Continue Prednisone, Tacrolimus, and Mycophenolate.  Labs reviewed.  Will follow up Tacrolimus level and adjust if needed.    3. Continue with Dapsone.    4. Continue with pancreatic enzymes and insulin.  Follows with primary care.    5. Follow-up in 3 months  or earlier if needed.    Francisca Morin DO  PCCM Fellow     Attending Note:    I have seen and evaluated the patient with the fellow. Their note reflects the content of our discussion and my plan of care.      Jake Alvarez MD  Pulmonary/Critical Care Medicine

## 2018-01-18 RX ORDER — TIZANIDINE 4 MG/1
TABLET ORAL
Qty: 90 TABLET | Refills: 0 | Status: SHIPPED | OUTPATIENT
Start: 2018-01-18 | End: 2018-02-27 | Stop reason: SDUPTHER

## 2018-01-22 DIAGNOSIS — Z94.2 LUNG REPLACED BY TRANSPLANT: ICD-10-CM

## 2018-01-22 RX ORDER — TACROLIMUS 1 MG/1
3 CAPSULE ORAL EVERY 12 HOURS
Qty: 180 CAPSULE | Refills: 11 | Status: SHIPPED | OUTPATIENT
Start: 2018-01-22 | End: 2018-07-28 | Stop reason: SDUPTHER

## 2018-01-22 NOTE — TELEPHONE ENCOUNTER
----- Message from Lamar Walker sent at 1/22/2018  1:21 PM CST -----  Contact: Pt  Pt has a general question and would Enedina to call her..     Please call her @ [phone]

## 2018-01-22 NOTE — TELEPHONE ENCOUNTER
Patient called asking if she can take a 7 day cruise.  Dr. Alvarez was notified and cleared patient to take her trip.  Patient was notified and will let us know her location when she finds out. Patient was reminded to take extra hygiene precautions to prevent from getting sick. Patient verbalized understanding.

## 2018-01-31 DIAGNOSIS — J45.20 REACTIVE AIRWAY DISEASE, MILD INTERMITTENT, UNCOMPLICATED: ICD-10-CM

## 2018-02-01 RX ORDER — MOMETASONE FUROATE AND FORMOTEROL FUMARATE DIHYDRATE 100; 5 UG/1; UG/1
AEROSOL RESPIRATORY (INHALATION)
Qty: 13 G | Refills: 0 | Status: SHIPPED | OUTPATIENT
Start: 2018-02-01 | End: 2019-01-01 | Stop reason: SDUPTHER

## 2018-02-19 DIAGNOSIS — E84.19 CYSTIC FIBROSIS WITH GASTROINTESTINAL MANIFESTATIONS: ICD-10-CM

## 2018-02-20 RX ORDER — PANCRELIPASE 24000; 76000; 120000 [USP'U]/1; [USP'U]/1; [USP'U]/1
CAPSULE, DELAYED RELEASE PELLETS ORAL
Qty: 810 CAPSULE | Refills: 0 | Status: SHIPPED | OUTPATIENT
Start: 2018-02-20 | End: 2018-05-03 | Stop reason: SDUPTHER

## 2018-02-27 DIAGNOSIS — R11.0 NAUSEA: ICD-10-CM

## 2018-02-27 RX ORDER — ONDANSETRON HYDROCHLORIDE 8 MG/1
TABLET, FILM COATED ORAL
Qty: 30 TABLET | Refills: 0 | Status: SHIPPED | OUTPATIENT
Start: 2018-02-27 | End: 2018-05-03 | Stop reason: SDUPTHER

## 2018-02-27 RX ORDER — TIZANIDINE 4 MG/1
TABLET ORAL
Qty: 90 TABLET | Refills: 0 | Status: SHIPPED | OUTPATIENT
Start: 2018-02-27 | End: 2018-05-03 | Stop reason: SDUPTHER

## 2018-02-28 RX ORDER — PROMETHAZINE HYDROCHLORIDE 25 MG/1
TABLET ORAL
Qty: 45 TABLET | Refills: 0 | OUTPATIENT
Start: 2018-02-28

## 2018-03-14 DIAGNOSIS — Z94.2 LUNG REPLACED BY TRANSPLANT: Primary | ICD-10-CM

## 2018-03-14 DIAGNOSIS — E56.1 VITAMIN K DEFICIENCY: ICD-10-CM

## 2018-03-14 DIAGNOSIS — E55.9 VITAMIN D DEFICIENCY: ICD-10-CM

## 2018-03-14 DIAGNOSIS — E56.0 VITAMIN E DEFICIENCY: ICD-10-CM

## 2018-03-14 DIAGNOSIS — E50.9 VITAMIN A DEFICIENCY: Primary | ICD-10-CM

## 2018-03-17 PROBLEM — J32.9 SINUS INFECTION: Status: ACTIVE | Noted: 2018-03-17

## 2018-03-18 ENCOUNTER — HOSPITAL ENCOUNTER (INPATIENT)
Facility: HOSPITAL | Age: 28
LOS: 4 days | Discharge: HOME | DRG: 167 | End: 2018-03-22
Attending: INTERNAL MEDICINE | Admitting: INTERNAL MEDICINE
Payer: MEDICAID

## 2018-03-18 DIAGNOSIS — J32.9 SINUS INFECTION: ICD-10-CM
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
## 2018-03-18 DIAGNOSIS — J18.9 HOSPITAL ACQUIRED PNA: Primary | ICD-10-CM

## 2018-03-18 DIAGNOSIS — J44.81 BRONCHIOLITIS OBLITERANS SYNDROME: ICD-10-CM

## 2018-03-18 DIAGNOSIS — G89.4 CHRONIC PAIN SYNDROME: ICD-10-CM

## 2018-03-18 DIAGNOSIS — T86.819 COMPLICATION OF TRANSPLANTED LUNG, UNSPECIFIED COMPLICATION: ICD-10-CM

## 2018-03-18 DIAGNOSIS — Z94.2 LUNG TRANSPLANT STATUS, BILATERAL: ICD-10-CM

## 2018-03-18 DIAGNOSIS — J32.4 PANSINUSITIS, UNSPECIFIED CHRONICITY: ICD-10-CM

## 2018-03-18 PROBLEM — I10 HYPERTENSION: Status: ACTIVE | Noted: 2018-03-18

## 2018-03-18 LAB
ALBUMIN SERPL BCP-MCNC: 3.3 G/DL
ALP SERPL-CCNC: 132 U/L
ALT SERPL W/O P-5'-P-CCNC: 28 U/L
ANION GAP SERPL CALC-SCNC: 9 MMOL/L
ANION GAP SERPL CALC-SCNC: 9 MMOL/L
AST SERPL-CCNC: 37 U/L
BASOPHILS # BLD AUTO: 0.02 K/UL
BASOPHILS NFR BLD: 0.3 %
BILIRUB SERPL-MCNC: 0.5 MG/DL
BUN SERPL-MCNC: 14 MG/DL
BUN SERPL-MCNC: 14 MG/DL
CALCIUM SERPL-MCNC: 9 MG/DL
CALCIUM SERPL-MCNC: 9 MG/DL
CHLORIDE SERPL-SCNC: 106 MMOL/L
CHLORIDE SERPL-SCNC: 106 MMOL/L
CO2 SERPL-SCNC: 22 MMOL/L
CO2 SERPL-SCNC: 22 MMOL/L
CREAT SERPL-MCNC: 0.9 MG/DL
CREAT SERPL-MCNC: 0.9 MG/DL
DIFFERENTIAL METHOD: ABNORMAL
EOSINOPHIL # BLD AUTO: 0 K/UL
EOSINOPHIL NFR BLD: 0.4 %
ERYTHROCYTE [DISTWIDTH] IN BLOOD BY AUTOMATED COUNT: 14.2 %
EST. GFR  (AFRICAN AMERICAN): >60 ML/MIN/1.73 M^2
EST. GFR  (AFRICAN AMERICAN): >60 ML/MIN/1.73 M^2
EST. GFR  (NON AFRICAN AMERICAN): >60 ML/MIN/1.73 M^2
EST. GFR  (NON AFRICAN AMERICAN): >60 ML/MIN/1.73 M^2
GLUCOSE SERPL-MCNC: 136 MG/DL
GLUCOSE SERPL-MCNC: 136 MG/DL
HCT VFR BLD AUTO: 29.3 %
HGB BLD-MCNC: 8.9 G/DL
IMM GRANULOCYTES # BLD AUTO: 0.02 K/UL
IMM GRANULOCYTES NFR BLD AUTO: 0.3 %
LYMPHOCYTES # BLD AUTO: 0.8 K/UL
LYMPHOCYTES NFR BLD: 10 %
MAGNESIUM SERPL-MCNC: 1.6 MG/DL
MCH RBC QN AUTO: 29.6 PG
MCHC RBC AUTO-ENTMCNC: 30.4 G/DL
MCV RBC AUTO: 97 FL
MONOCYTES # BLD AUTO: 0.4 K/UL
MONOCYTES NFR BLD: 5.6 %
NEUTROPHILS # BLD AUTO: 6.4 K/UL
NEUTROPHILS NFR BLD: 83.4 %
NRBC BLD-RTO: 0 /100 WBC
PLATELET # BLD AUTO: 116 K/UL
PMV BLD AUTO: 10.4 FL
POTASSIUM SERPL-SCNC: 4.7 MMOL/L
POTASSIUM SERPL-SCNC: 4.7 MMOL/L
PROCALCITONIN SERPL IA-MCNC: 0.16 NG/ML
PROT SERPL-MCNC: 6.8 G/DL
RBC # BLD AUTO: 3.01 M/UL
SODIUM SERPL-SCNC: 137 MMOL/L
SODIUM SERPL-SCNC: 137 MMOL/L
WBC # BLD AUTO: 7.67 K/UL

## 2018-03-18 PROCEDURE — 85025 COMPLETE CBC W/AUTO DIFF WBC: CPT

## 2018-03-18 PROCEDURE — 87449 NOS EACH ORGANISM AG IA: CPT

## 2018-03-18 PROCEDURE — 63600175 PHARM REV CODE 636 W HCPCS: Performed by: INTERNAL MEDICINE

## 2018-03-18 PROCEDURE — 84145 PROCALCITONIN (PCT): CPT

## 2018-03-18 PROCEDURE — 87305 ASPERGILLUS AG IA: CPT

## 2018-03-18 PROCEDURE — 36415 COLL VENOUS BLD VENIPUNCTURE: CPT

## 2018-03-18 PROCEDURE — 20600001 HC STEP DOWN PRIVATE ROOM

## 2018-03-18 PROCEDURE — 80197 ASSAY OF TACROLIMUS: CPT

## 2018-03-18 PROCEDURE — 83735 ASSAY OF MAGNESIUM: CPT

## 2018-03-18 PROCEDURE — 80053 COMPREHEN METABOLIC PANEL: CPT

## 2018-03-18 PROCEDURE — 25000003 PHARM REV CODE 250: Performed by: INTERNAL MEDICINE

## 2018-03-18 PROCEDURE — 99223 1ST HOSP IP/OBS HIGH 75: CPT | Mod: AI,,, | Performed by: INTERNAL MEDICINE

## 2018-03-18 RX ORDER — GLUCAGON 1 MG
1 KIT INJECTION
Status: DISCONTINUED | OUTPATIENT
Start: 2018-03-18 | End: 2018-03-21

## 2018-03-18 RX ORDER — LANOLIN ALCOHOL/MO/W.PET/CERES
400 CREAM (GRAM) TOPICAL 2 TIMES DAILY
Status: DISCONTINUED | OUTPATIENT
Start: 2018-03-18 | End: 2018-03-22 | Stop reason: HOSPADM

## 2018-03-18 RX ORDER — DIPHENHYDRAMINE HYDROCHLORIDE 50 MG/ML
50 INJECTION INTRAMUSCULAR; INTRAVENOUS EVERY 6 HOURS PRN
Status: DISCONTINUED | OUTPATIENT
Start: 2018-03-18 | End: 2018-03-22 | Stop reason: HOSPADM

## 2018-03-18 RX ORDER — INSULIN ASPART 100 [IU]/ML
0-5 INJECTION, SOLUTION INTRAVENOUS; SUBCUTANEOUS
Status: DISCONTINUED | OUTPATIENT
Start: 2018-03-18 | End: 2018-03-21

## 2018-03-18 RX ORDER — TOPIRAMATE 25 MG/1
25 TABLET ORAL 2 TIMES DAILY
Status: DISCONTINUED | OUTPATIENT
Start: 2018-03-18 | End: 2018-03-22 | Stop reason: HOSPADM

## 2018-03-18 RX ORDER — DAPSONE 100 MG/1
100 TABLET ORAL DAILY
Status: DISCONTINUED | OUTPATIENT
Start: 2018-03-19 | End: 2018-03-22 | Stop reason: HOSPADM

## 2018-03-18 RX ORDER — VANCOMYCIN/0.9 % SOD CHLORIDE 1 G/100 ML
1000 PLASTIC BAG, INJECTION (ML) INTRAVENOUS
Status: DISCONTINUED | OUTPATIENT
Start: 2018-03-18 | End: 2018-03-21

## 2018-03-18 RX ORDER — TIZANIDINE 4 MG/1
4 TABLET ORAL EVERY 8 HOURS PRN
Status: DISCONTINUED | OUTPATIENT
Start: 2018-03-18 | End: 2018-03-22 | Stop reason: HOSPADM

## 2018-03-18 RX ORDER — PROMETHAZINE HYDROCHLORIDE 25 MG/1
25 TABLET ORAL EVERY 6 HOURS PRN
Status: DISCONTINUED | OUTPATIENT
Start: 2018-03-18 | End: 2018-03-22 | Stop reason: HOSPADM

## 2018-03-18 RX ORDER — IBUPROFEN 200 MG
16 TABLET ORAL
Status: DISCONTINUED | OUTPATIENT
Start: 2018-03-18 | End: 2018-03-21

## 2018-03-18 RX ORDER — LEVALBUTEROL 1.25 MG/.5ML
1.25 SOLUTION, CONCENTRATE RESPIRATORY (INHALATION)
Status: DISCONTINUED | OUTPATIENT
Start: 2018-03-18 | End: 2018-03-18

## 2018-03-18 RX ORDER — IBUPROFEN 200 MG
24 TABLET ORAL
Status: DISCONTINUED | OUTPATIENT
Start: 2018-03-18 | End: 2018-03-21

## 2018-03-18 RX ORDER — LEVALBUTEROL 1.25 MG/.5ML
1.25 SOLUTION, CONCENTRATE RESPIRATORY (INHALATION) EVERY 4 HOURS PRN
Status: DISCONTINUED | OUTPATIENT
Start: 2018-03-18 | End: 2018-03-22 | Stop reason: HOSPADM

## 2018-03-18 RX ORDER — MORPHINE SULFATE 15 MG/1
15 TABLET, FILM COATED, EXTENDED RELEASE ORAL 3 TIMES DAILY
Status: DISCONTINUED | OUTPATIENT
Start: 2018-03-18 | End: 2018-03-22 | Stop reason: HOSPADM

## 2018-03-18 RX ORDER — MONTELUKAST SODIUM 10 MG/1
10 TABLET ORAL DAILY
Status: DISCONTINUED | OUTPATIENT
Start: 2018-03-19 | End: 2018-03-22 | Stop reason: HOSPADM

## 2018-03-18 RX ORDER — PANTOPRAZOLE SODIUM 40 MG/1
40 TABLET, DELAYED RELEASE ORAL DAILY
Status: DISCONTINUED | OUTPATIENT
Start: 2018-03-19 | End: 2018-03-22 | Stop reason: HOSPADM

## 2018-03-18 RX ORDER — ARIPIPRAZOLE 2 MG/1
2 TABLET ORAL NIGHTLY
Status: DISCONTINUED | OUTPATIENT
Start: 2018-03-18 | End: 2018-03-22 | Stop reason: HOSPADM

## 2018-03-18 RX ORDER — FLUTICASONE PROPIONATE 50 MCG
2 SPRAY, SUSPENSION (ML) NASAL DAILY
Status: DISCONTINUED | OUTPATIENT
Start: 2018-03-19 | End: 2018-03-22 | Stop reason: HOSPADM

## 2018-03-18 RX ORDER — PREDNISONE 5 MG/1
5 TABLET ORAL DAILY
Status: DISCONTINUED | OUTPATIENT
Start: 2018-03-19 | End: 2018-03-22 | Stop reason: HOSPADM

## 2018-03-18 RX ORDER — ONDANSETRON 2 MG/ML
4 INJECTION INTRAMUSCULAR; INTRAVENOUS EVERY 6 HOURS PRN
Status: DISCONTINUED | OUTPATIENT
Start: 2018-03-18 | End: 2018-03-22 | Stop reason: HOSPADM

## 2018-03-18 RX ORDER — MEROPENEM 1 G/1
1 INJECTION, POWDER, FOR SOLUTION INTRAVENOUS
Status: DISCONTINUED | OUTPATIENT
Start: 2018-03-18 | End: 2018-03-22 | Stop reason: HOSPADM

## 2018-03-18 RX ORDER — LORAZEPAM 1 MG/1
1 TABLET ORAL EVERY 6 HOURS PRN
Status: DISCONTINUED | OUTPATIENT
Start: 2018-03-18 | End: 2018-03-22 | Stop reason: HOSPADM

## 2018-03-18 RX ORDER — DOCUSATE SODIUM 100 MG/1
100 CAPSULE, LIQUID FILLED ORAL 2 TIMES DAILY
Status: DISCONTINUED | OUTPATIENT
Start: 2018-03-18 | End: 2018-03-22 | Stop reason: HOSPADM

## 2018-03-18 RX ORDER — OXYCODONE HYDROCHLORIDE 5 MG/1
10 TABLET ORAL EVERY 8 HOURS PRN
Status: DISCONTINUED | OUTPATIENT
Start: 2018-03-18 | End: 2018-03-19

## 2018-03-18 RX ORDER — BUTALBITAL, ACETAMINOPHEN AND CAFFEINE 50; 325; 40 MG/1; MG/1; MG/1
1 TABLET ORAL EVERY 4 HOURS PRN
Status: DISCONTINUED | OUTPATIENT
Start: 2018-03-18 | End: 2018-03-22 | Stop reason: HOSPADM

## 2018-03-18 RX ORDER — FERROUS SULFATE, DRIED 160(50) MG
1 TABLET, EXTENDED RELEASE ORAL 2 TIMES DAILY
Status: DISCONTINUED | OUTPATIENT
Start: 2018-03-18 | End: 2018-03-22 | Stop reason: HOSPADM

## 2018-03-18 RX ORDER — MIRTAZAPINE 15 MG/1
45 TABLET, FILM COATED ORAL NIGHTLY
Status: DISCONTINUED | OUTPATIENT
Start: 2018-03-18 | End: 2018-03-22 | Stop reason: HOSPADM

## 2018-03-18 RX ORDER — FOLIC ACID 1 MG/1
1 TABLET ORAL DAILY
Status: DISCONTINUED | OUTPATIENT
Start: 2018-03-19 | End: 2018-03-22 | Stop reason: HOSPADM

## 2018-03-18 RX ADMIN — MAGNESIUM OXIDE TAB 400 MG (241.3 MG ELEMENTAL MG) 400 MG: 400 (241.3 MG) TAB at 07:03

## 2018-03-18 RX ADMIN — TACROLIMUS 3.5 MG: 1 CAPSULE ORAL at 07:03

## 2018-03-18 RX ADMIN — OXYCODONE HYDROCHLORIDE 10 MG: 5 TABLET ORAL at 07:03

## 2018-03-18 RX ADMIN — TOPIRAMATE 25 MG: 25 TABLET, FILM COATED ORAL at 07:03

## 2018-03-18 RX ADMIN — TOBRAMYCIN SULFATE 580 MG: 40 INJECTION, SOLUTION INTRAMUSCULAR; INTRAVENOUS at 11:03

## 2018-03-18 RX ADMIN — DIPHENHYDRAMINE HYDROCHLORIDE 50 MG: 50 INJECTION, SOLUTION INTRAMUSCULAR; INTRAVENOUS at 07:03

## 2018-03-18 RX ADMIN — OYSTER SHELL CALCIUM WITH VITAMIN D 1 TABLET: 500; 200 TABLET, FILM COATED ORAL at 07:03

## 2018-03-18 RX ADMIN — Medication 1 G: at 07:03

## 2018-03-18 RX ADMIN — ARIPIPRAZOLE 2 MG: 2 TABLET ORAL at 07:03

## 2018-03-18 RX ADMIN — MEROPENEM 1 G: 1 INJECTION, POWDER, FOR SOLUTION INTRAVENOUS at 07:03

## 2018-03-18 RX ADMIN — MIRTAZAPINE 45 MG: 15 TABLET, FILM COATED ORAL at 07:03

## 2018-03-18 RX ADMIN — GABAPENTIN 1100 MG: 400 CAPSULE ORAL at 07:03

## 2018-03-18 NOTE — PROGRESS NOTES
Pt to TSU room 8068. In no apparent distress upon arrival to floor. MD Salena notified. Stated will come to bedside for assessment and orders. WCCARLA.

## 2018-03-19 ENCOUNTER — ANESTHESIA EVENT (OUTPATIENT)
Dept: SURGERY | Facility: HOSPITAL | Age: 28
DRG: 167 | End: 2018-03-19
Payer: MEDICAID

## 2018-03-19 PROBLEM — I10 HYPERTENSION: Status: ACTIVE | Noted: 2018-03-19

## 2018-03-19 LAB
ANION GAP SERPL CALC-SCNC: 9 MMOL/L
BASOPHILS # BLD AUTO: 0.02 K/UL
BASOPHILS NFR BLD: 0.3 %
BUN SERPL-MCNC: 13 MG/DL
CALCIUM SERPL-MCNC: 8.1 MG/DL
CHLORIDE SERPL-SCNC: 109 MMOL/L
CO2 SERPL-SCNC: 20 MMOL/L
CREAT SERPL-MCNC: 0.9 MG/DL
DIFFERENTIAL METHOD: ABNORMAL
EOSINOPHIL # BLD AUTO: 0.2 K/UL
EOSINOPHIL NFR BLD: 2.2 %
ERYTHROCYTE [DISTWIDTH] IN BLOOD BY AUTOMATED COUNT: 14.1 %
EST. GFR  (AFRICAN AMERICAN): >60 ML/MIN/1.73 M^2
EST. GFR  (NON AFRICAN AMERICAN): >60 ML/MIN/1.73 M^2
GLUCOSE SERPL-MCNC: 69 MG/DL
HCT VFR BLD AUTO: 29.6 %
HGB BLD-MCNC: 8.9 G/DL
IMM GRANULOCYTES # BLD AUTO: 0.02 K/UL
IMM GRANULOCYTES NFR BLD AUTO: 0.3 %
LYMPHOCYTES # BLD AUTO: 2 K/UL
LYMPHOCYTES NFR BLD: 30 %
MAGNESIUM SERPL-MCNC: 1.7 MG/DL
MCH RBC QN AUTO: 29 PG
MCHC RBC AUTO-ENTMCNC: 30.1 G/DL
MCV RBC AUTO: 96 FL
MONOCYTES # BLD AUTO: 0.6 K/UL
MONOCYTES NFR BLD: 9.4 %
NEUTROPHILS # BLD AUTO: 3.9 K/UL
NEUTROPHILS NFR BLD: 57.8 %
NRBC BLD-RTO: 0 /100 WBC
PLATELET # BLD AUTO: 132 K/UL
PMV BLD AUTO: 10.7 FL
POCT GLUCOSE: 102 MG/DL (ref 70–110)
POCT GLUCOSE: 135 MG/DL (ref 70–110)
POCT GLUCOSE: 137 MG/DL (ref 70–110)
POCT GLUCOSE: 80 MG/DL (ref 70–110)
POTASSIUM SERPL-SCNC: 4.1 MMOL/L
RBC # BLD AUTO: 3.07 M/UL
SODIUM SERPL-SCNC: 138 MMOL/L
TACROLIMUS BLD-MCNC: 11.3 NG/ML
TACROLIMUS BLD-MCNC: 8.6 NG/ML
VANCOMYCIN TROUGH SERPL-MCNC: 28.1 UG/ML
WBC # BLD AUTO: 6.71 K/UL

## 2018-03-19 PROCEDURE — 25000003 PHARM REV CODE 250: Performed by: INTERNAL MEDICINE

## 2018-03-19 PROCEDURE — 83735 ASSAY OF MAGNESIUM: CPT

## 2018-03-19 PROCEDURE — 80197 ASSAY OF TACROLIMUS: CPT

## 2018-03-19 PROCEDURE — 87385 HISTOPLASMA CAPSUL AG IA: CPT

## 2018-03-19 PROCEDURE — 80202 ASSAY OF VANCOMYCIN: CPT

## 2018-03-19 PROCEDURE — 99232 SBSQ HOSP IP/OBS MODERATE 35: CPT | Mod: ,,, | Performed by: INTERNAL MEDICINE

## 2018-03-19 PROCEDURE — 25000242 PHARM REV CODE 250 ALT 637 W/ HCPCS: Performed by: INTERNAL MEDICINE

## 2018-03-19 PROCEDURE — 99233 SBSQ HOSP IP/OBS HIGH 50: CPT | Mod: ,,, | Performed by: INTERNAL MEDICINE

## 2018-03-19 PROCEDURE — 85025 COMPLETE CBC W/AUTO DIFF WBC: CPT

## 2018-03-19 PROCEDURE — 99232 SBSQ HOSP IP/OBS MODERATE 35: CPT | Mod: 25,,, | Performed by: OTOLARYNGOLOGY

## 2018-03-19 PROCEDURE — 94761 N-INVAS EAR/PLS OXIMETRY MLT: CPT

## 2018-03-19 PROCEDURE — 20600001 HC STEP DOWN PRIVATE ROOM

## 2018-03-19 PROCEDURE — 63600175 PHARM REV CODE 636 W HCPCS: Performed by: INTERNAL MEDICINE

## 2018-03-19 PROCEDURE — 87486 CHLMYD PNEUM DNA AMP PROBE: CPT

## 2018-03-19 PROCEDURE — 80048 BASIC METABOLIC PNL TOTAL CA: CPT

## 2018-03-19 PROCEDURE — 31231 NASAL ENDOSCOPY DX: CPT | Mod: ,,, | Performed by: OTOLARYNGOLOGY

## 2018-03-19 PROCEDURE — 25000003 PHARM REV CODE 250

## 2018-03-19 PROCEDURE — 25000003 PHARM REV CODE 250: Performed by: PHYSICIAN ASSISTANT

## 2018-03-19 RX ORDER — VALGANCICLOVIR 450 MG/1
900 TABLET, FILM COATED ORAL 2 TIMES DAILY
Status: DISCONTINUED | OUTPATIENT
Start: 2018-03-19 | End: 2018-03-22 | Stop reason: HOSPADM

## 2018-03-19 RX ORDER — OXYCODONE HYDROCHLORIDE 5 MG/1
10 TABLET ORAL EVERY 6 HOURS PRN
Status: DISCONTINUED | OUTPATIENT
Start: 2018-03-19 | End: 2018-03-22 | Stop reason: HOSPADM

## 2018-03-19 RX ORDER — OXYCODONE HYDROCHLORIDE 5 MG/1
5 TABLET ORAL ONCE
Status: COMPLETED | OUTPATIENT
Start: 2018-03-19 | End: 2018-03-19

## 2018-03-19 RX ADMIN — OXYCODONE HYDROCHLORIDE 10 MG: 5 TABLET ORAL at 05:03

## 2018-03-19 RX ADMIN — MEROPENEM 1 G: 1 INJECTION, POWDER, FOR SOLUTION INTRAVENOUS at 05:03

## 2018-03-19 RX ADMIN — MORPHINE SULFATE 15 MG: 15 TABLET, EXTENDED RELEASE ORAL at 12:03

## 2018-03-19 RX ADMIN — TIZANIDINE 4 MG: 4 TABLET ORAL at 09:03

## 2018-03-19 RX ADMIN — Medication 1 G: at 08:03

## 2018-03-19 RX ADMIN — DIPHENHYDRAMINE HYDROCHLORIDE 50 MG: 50 INJECTION, SOLUTION INTRAMUSCULAR; INTRAVENOUS at 08:03

## 2018-03-19 RX ADMIN — TACROLIMUS 3.5 MG: 1 CAPSULE ORAL at 08:03

## 2018-03-19 RX ADMIN — TOPIRAMATE 25 MG: 25 TABLET, FILM COATED ORAL at 08:03

## 2018-03-19 RX ADMIN — VALGANCICLOVIR 900 MG: 450 TABLET, FILM COATED ORAL at 08:03

## 2018-03-19 RX ADMIN — MEROPENEM 1 G: 1 INJECTION, POWDER, FOR SOLUTION INTRAVENOUS at 08:03

## 2018-03-19 RX ADMIN — OXYCODONE HYDROCHLORIDE 5 MG: 5 TABLET ORAL at 08:03

## 2018-03-19 RX ADMIN — PREDNISONE 5 MG: 5 TABLET ORAL at 08:03

## 2018-03-19 RX ADMIN — GABAPENTIN 1100 MG: 400 CAPSULE ORAL at 02:03

## 2018-03-19 RX ADMIN — OYSTER SHELL CALCIUM WITH VITAMIN D 1 TABLET: 500; 200 TABLET, FILM COATED ORAL at 08:03

## 2018-03-19 RX ADMIN — MEROPENEM 1 G: 1 INJECTION, POWDER, FOR SOLUTION INTRAVENOUS at 11:03

## 2018-03-19 RX ADMIN — OXYCODONE HYDROCHLORIDE 10 MG: 5 TABLET ORAL at 11:03

## 2018-03-19 RX ADMIN — DAPSONE 100 MG: 100 TABLET ORAL at 08:03

## 2018-03-19 RX ADMIN — MIRTAZAPINE 45 MG: 15 TABLET, FILM COATED ORAL at 08:03

## 2018-03-19 RX ADMIN — MAGNESIUM OXIDE TAB 400 MG (241.3 MG ELEMENTAL MG) 400 MG: 400 (241.3 MG) TAB at 08:03

## 2018-03-19 RX ADMIN — MORPHINE SULFATE 15 MG: 15 TABLET, EXTENDED RELEASE ORAL at 08:03

## 2018-03-19 RX ADMIN — LORAZEPAM 1 MG: 1 TABLET ORAL at 08:03

## 2018-03-19 RX ADMIN — OXYCODONE HYDROCHLORIDE 10 MG: 5 TABLET ORAL at 10:03

## 2018-03-19 RX ADMIN — GABAPENTIN 1100 MG: 400 CAPSULE ORAL at 08:03

## 2018-03-19 RX ADMIN — TACROLIMUS 3.5 MG: 1 CAPSULE ORAL at 05:03

## 2018-03-19 RX ADMIN — MONTELUKAST SODIUM 10 MG: 10 TABLET, FILM COATED ORAL at 08:03

## 2018-03-19 RX ADMIN — FOLIC ACID 1 MG: 1 TABLET ORAL at 08:03

## 2018-03-19 RX ADMIN — ARIPIPRAZOLE 2 MG: 2 TABLET ORAL at 08:03

## 2018-03-19 RX ADMIN — DIPHENHYDRAMINE HYDROCHLORIDE 50 MG: 50 INJECTION, SOLUTION INTRAMUSCULAR; INTRAVENOUS at 02:03

## 2018-03-19 RX ADMIN — PANCRELIPASE 6 CAPSULE: 24000; 76000; 120000 CAPSULE, DELAYED RELEASE PELLETS ORAL at 05:03

## 2018-03-19 RX ADMIN — VALGANCICLOVIR 900 MG: 450 TABLET, FILM COATED ORAL at 10:03

## 2018-03-19 RX ADMIN — FLUTICASONE PROPIONATE 100 MCG: 50 SPRAY, METERED NASAL at 08:03

## 2018-03-19 RX ADMIN — MORPHINE SULFATE 15 MG: 15 TABLET, EXTENDED RELEASE ORAL at 02:03

## 2018-03-19 NOTE — ASSESSMENT & PLAN NOTE
Will continue home medications which include Gabapentin 1100mg TID, MS Contin 15mg TID, Oxycodone 10mg prn, Tizanidine 4mg.

## 2018-03-19 NOTE — CONSULTS
Consult acknowledged and reviewed for pansinusitis in lung transplant recipient. Formal note to follow.    Juanita Francis MD  Transplant ID Attending  [id number]

## 2018-03-19 NOTE — ASSESSMENT & PLAN NOTE
Admitted in [place] on 3/6/2018 for acute sinusitis and was to complete 14 days of inpatient IV therapy.  Developed fevers up to 101.8F on 3/17/2018 and was transferred to [facility] for further management.      No culture data available from OSH.  Most recent cultures here have shown MRSA/Pseudomonas in 2017.  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    Will continue current IV therapy with Vanc/Merrem--pre medication with IV benadryl prior to Vanc.  Received tobramycin x 1 yesterday.   CT sinuses yesterday worsening paranasal sinus opacification.  Respiratory culture, viral pathogens panel, CMV PCR, aspergillus ag, and fungitell pending.  ID and ENT consulted, appreciate recs.

## 2018-03-19 NOTE — ASSESSMENT & PLAN NOTE
Pa/lateral CXR from admission with RLL infiltrate, +crackles RLL on exam, Procalcitonin 0.16.   WBC - 6.71 today, remains afebrile, VSS, was placed on Merropenem/Vanc whilein [place] on 3/6 admission, received tobramycin x 1 yesterday.   Continue Merropenem/Vanc, ID following, appreciate recs.   Continue daily labs. OR Bronchoscopy scheduled for tomorrow morning.

## 2018-03-19 NOTE — H&P
"[facility]  Lung Transplant  H&P    Patient Name: Juanita Ibarra  MRN: [id number]  Admission Date: 3/18/2018  Attending Physician: Jake Alvarez MD  Primary Care Provider: Jake Alvarez MD     Subjective:     History of Present Illness:  Juanita Ibarra is a 29 y/o woman with a hx of BLT on 6/12/2014 for a diagnosis of cystic fibrosis.  Her post transplant course has been complicated by Candida Glabrata positive sputum, Pseudomonas PNA in 2015, CMV viremia in 7/2015, and CARLOS EDUARDO grade 3.  She was last seen in clinic in 1/2018.  Was doing well with stable dyspnea and not requiring any supplemental oxygen.    Since then, she presented to her PCP earlier this month for evaluation of sinusitis.  She states that with the cooler/rainy weather, she developed some sinus congestion.  She was not having a cough or dyspnea above her baseline at that time.  She was admitted to the hospital on 3/6/2018 for a 14 day course of IV abx.  During her hospital course, she reports having improved sinus congestion with Vanc/Merrem.  She felt like her sinuses, cough, and dyspnea were all back to baseline until hospital day 11 when she began having fevers, chills, and nightsweats.  It appears from records that her Tmax was 101.8F.  At that time, she also developed some increased dyspnea and a cough which is occasionally productive of white sputum.  She states that she "just doesn't feel right" and that the last time she felt like this was when she developed CMV viremia.  She has no known sick contacts but has been in an OSH for the last 11 days.      Regimen at the OSH included Vanc/Merrem and she was given a one time dose of Tobramycin on 3/17/2018.  Uncertain of any positive cultures or imaging results.           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    Cystic fibrosis of the lung     Ear infection  "    Hypertension     MRSA (methicillin resistant Staphylococcus aureus) carrier     Osteopenia     Other specified disease of pancreas     Pain disorder     Seizures     Sinusitis, chronic     Vocal cord paralysis 06/2014    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       Past Surgical History:   Procedure Laterality Date    ABDOMINAL SURGERY      peg tube     CHOLECYSTECTOMY  2016    ENDOMETRIAL ABLATION  2015    [facility]    FESS      LARYNX SURGERY  2016    LUNG TRANSPLANT Bilateral 6/12/14    MYRINGOTOMY W/ TUBES Right 04/2017    PORTACATH PLACEMENT Right     rt sc    SALPINGECTOMY Bilateral 2015    [facility]       Review of patient's allergies indicates:   Allergen Reactions    Albuterol Palpitations    Colistin Anaphylaxis    Vancomycin analogues      Infusion reaction that does not resolve with slowing    Neupogen [filgrastim] Other (See Comments)     Ostealgia after five daily doses of 300 mcg.      Bactrim [sulfamethoxazole-trimethoprim] Hives    Ceftazidime Hives     Pt stated can tolerate cefapine not ceftazidime    Ceftazidime     Dronabinol Other (See Comments)     Mental changes/hallucinations    Haldol [haloperidol lactate] Other (See Comments)     Seizure like activity    Nsaids (non-steroidal anti-inflammatory drug)      Cannot have due to lung transplant    Adhesive Rash     Cloth tape- please use tegaderm or paper tape    Aztreonam Rash    Ciprofloxacin Nausea And Vomiting     Projectile N/V, per patient.  Unwilling to retry therapy.       PTA Medications   Medication Sig    aluminum-magnesium hydroxide-simethicone (MAALOX ADVANCED) 200-200-20 mg/5 mL Susp Take 30 mLs by mouth before meals and at bedtime as needed.    amlodipine (NORVASC) 5 MG tablet Take 5 mg by mouth once daily.    aripiprazole (ABILIFY) 2 MG Tab Take 2 mg by mouth once daily.    butalbital-acetaminophen-caffeine -40 mg (FIORICET, ESGIC) -40 mg per tablet TAKE 1 TABLET BY MOUTH EVERY 6 HOURS AS NEEDED FOR HEADACHE     calcium-vitamin D3 500 mg(1,250mg) -200 unit per tablet Take 1 tablet by mouth 2 (two) times daily with meals.    CREON 24,000-76,000 -120,000 unit capsule TAKE 5 CAPSULES BY MOUTH WITH MEALS AND 4 CAPSULES WITH SNACKS EVERYDAY    dapsone 100 MG Tab TAKE 1 TABLET BY MOUTH EVERY DAY    diphenhydrAMINE (BENADRYL) 50 MG tablet Take 1 tablet (50 mg total) by mouth every 6 (six) hours as needed for Itching.    DULERA 100-5 mcg/actuation HFAA INHALE 1 PUFF BY MOUTH TWICE DAILY (Patient taking differently: INHALE 2 PUFF BY MOUTH TWICE DAILY)    DULERA 100-5 mcg/actuation HFAA INHALE 1 PUFF BY MOUTH TWICE DAILY    fexofenadine (ALLEGRA) 180 MG tablet Take 180 mg by mouth once daily.    fluconazole (DIFLUCAN) 150 MG Tab TAKE 1 TABLET BY MOUTH EVERY WEEK (Patient taking differently: TAKE 1 TABLET BY MOUTH EVERY WEEK PRN VAGINAL YEAST S/S)    fluticasone (FLONASE) 50 mcg/actuation nasal spray INSERT 2 SPRAYS IN EACH NOSTRIL DAILY    folic acid (FOLVITE) 1 MG tablet Take 1 tablet (1,000 mcg total) by mouth once daily.    gabapentin (NEURONTIN) 300 MG capsule Take 1 capsule (300 mg total) by mouth 3 (three) times daily. (Patient taking differently: Take 1,100 mg by mouth 3 (three) times daily. )    insulin glargine, TOUJEO, (TOUJEO) 300 unit/mL (1.5 mL) InPn pen Inject 3 Units into the skin once daily.     levalbuterol (XOPENEX HFA) 45 mcg/actuation inhaler INHALE 1 TO 2 PUFFS INTO THE LUNGS EVERY 6 HOURS AS NEEDED FOR WHEEZING OR SHORTNESS OF BREATH. USE WITH SPACER.    lorazepam (ATIVAN) 2 MG Tab Take 1 tablet (2 mg total) by mouth every 12 (twelve) hours as needed (for anxiety).    magnesium oxide (MAG-OX) 400 mg tablet Take 1 tablet (400 mg total) by mouth 2 (two) times daily.    mirtazapine (REMERON) 30 MG tablet Take 30 mg by mouth every evening.    montelukast (SINGULAIR) 10 mg tablet TAKE 1 TABLET BY MOUTH EVERY DAY    morphine (MS CONTIN) 15 MG 12 hr tablet Take 15 mg by mouth 3 (three) times daily.     elizabeth. min cmb#[id number] (AQUADEKS) 100-700-10 mcg-mcg-mg Cap cap Take 1 capsule by mouth 2 (two) times daily.    mycophenolate (CELLCEPT) 250 mg Cap Take 2 capsules (500 mg total) by mouth 2 (two) times daily.    omeprazole (PRILOSEC) 40 MG capsule TAKE 1 CAPSULE BY MOUTH EVERY MORNING    ondansetron (ZOFRAN) 8 MG tablet Take 1 tablet (8 mg total) by mouth every 8 (eight) hours as needed.    ondansetron (ZOFRAN) 8 MG tablet TAKE 1 TABLET(8 MG) BY MOUTH EVERY 8 HOURS AS NEEDED    oxycodone (ROXICODONE) 5 MG immediate release tablet Take 10 mg by mouth every 4 (four) hours as needed for Pain.    polyethylene glycol (GLYCOLAX) 17 gram PwPk Take 17 g by mouth 2 (two) times daily.    predniSONE (DELTASONE) 5 MG tablet Take 5 mg by mouth once daily.    PROCHAMBER USE AS DIRECTED    promethazine (PHENERGAN) 25 MG tablet TAKE 1 TABLET BY MOUTH EVERY 8 HOURS AS NEEDED FOR NAUSEA    tacrolimus (PROGRAF) 0.5 MG Cap Take 1 capsule (0.5 mg total) by mouth every 12 (twelve) hours.    tacrolimus (PROGRAF) 1 MG Cap Take 3 capsules (3 mg total) by mouth every 12 (twelve) hours. Daily doses: 3.5 mg every 12 hours.    tiZANidine (ZANAFLEX) 4 MG tablet TAKE 2 TABLETS BY MOUTH EVERY 6 HOURS AS NEEDED     Family History     Problem Relation (Age of Onset)    Drug abuse Maternal Grandfather    Thrombocytopenia Maternal Grandfather        Social History Main Topics    Smoking status: Never Smoker    Smokeless tobacco: Never Used    Alcohol use No      Comment: Has not had an alcoholic drink in more than 2 months.    Drug use: No    Sexual activity: Yes     Partners: Male     Birth control/ protection: Implant     Review of Systems   Constitutional: Positive for chills, fatigue and fever. Negative for activity change, appetite change and diaphoresis.   HENT: Positive for congestion. Negative for dental problem, drooling, ear discharge, ear pain, facial swelling, hearing loss, mouth sores, nosebleeds, postnasal drip,  rhinorrhea, sinus pain, sinus pressure, sneezing, sore throat and voice change.    Eyes: Negative for photophobia, pain, redness, itching and visual disturbance.   Respiratory: Positive for shortness of breath. Negative for cough, choking, chest tightness, wheezing and stridor.    Cardiovascular: Negative for chest pain, palpitations and leg swelling.   Gastrointestinal: Negative for abdominal distention, abdominal pain, constipation, diarrhea, nausea and vomiting.   Endocrine: Negative for cold intolerance, heat intolerance, polydipsia, polyphagia and polyuria.   Genitourinary: Negative for difficulty urinating, dysuria, flank pain, frequency and urgency.   Musculoskeletal: Negative for arthralgias, back pain, gait problem, myalgias, neck pain and neck stiffness.   Skin: Negative for color change and rash.   Allergic/Immunologic: Positive for immunocompromised state.   Neurological: Negative for dizziness, tremors, seizures, syncope, weakness, light-headedness, numbness and headaches.   Hematological: Negative for adenopathy.   Psychiatric/Behavioral: Negative for agitation and confusion.     Objective:     Vital Signs (Most Recent):  Temp: 98.9 °F (37.2 °C) (03/18/18 1742)  Pulse: 107 (03/18/18 1742)  Resp: 20 (03/18/18 1742)  BP: 107/70 (03/18/18 1742)  SpO2: (!) 93 % (03/18/18 1742) Vital Signs (24h Range):  Temp:  [98.9 °F (37.2 °C)] 98.9 °F (37.2 °C)  Pulse:  [107] 107  Resp:  [20] 20  SpO2:  [93 %] 93 %  BP: (107)/(70) 107/70     Weight: 58 kg (127 lb 13.9 oz)  Body mass index is 24.16 kg/m².    No intake or output data in the 24 hours ending 03/18/18 1935    Physical Exam   Constitutional: She is oriented to person, place, and time. She appears well-developed and well-nourished. No distress.   HENT:   Head: Normocephalic and atraumatic.   Right Ear: External ear normal.   Left Ear: External ear normal.   Nose: Nose normal.   Mouth/Throat: Oropharynx is clear and moist. No oropharyngeal exudate.   Eyes:  Conjunctivae and EOM are normal. Pupils are equal, round, and reactive to light. Right eye exhibits no discharge. Left eye exhibits no discharge. No scleral icterus.   Neck: Normal range of motion. Neck supple. No JVD present. No tracheal deviation present. No thyromegaly present.   Cardiovascular: Normal rate, regular rhythm, normal heart sounds and intact distal pulses.  Exam reveals no gallop and no friction rub.    No murmur heard.  Pulmonary/Chest: Effort normal. No accessory muscle usage. No tachypnea. No respiratory distress. She has no wheezes. She has no rhonchi. She has rales in the right lower field. She exhibits no tenderness.   Musculoskeletal: Normal range of motion. She exhibits no edema, tenderness or deformity.   Lymphadenopathy:     She has no cervical adenopathy.   Neurological: She is alert and oriented to person, place, and time. No cranial nerve deficit.   Skin: Skin is warm and dry. No rash noted. She is not diaphoretic. No erythema. No pallor.   Psychiatric: She has a normal mood and affect. Judgment normal.       Significant Labs:  CBC:  No results for input(s): WBC, RBC, HGB, HCT, PLT, MCV, MCH, MCHC in the last 72 hours.  BMP:  No results for input(s): GLUCOSE, NA, K, CL, CO2, BUN, CREATININE, CALCIUM in the last 72 hours.     Labs pending    Diagnostic Results:  CT sinuses, CXR PA/Lat ordered    Assessment/Plan:     Sinus infection    Admitted in [place] on 3/6/2018 for acute sinusitis and was to complete 14 days of inpatient IV therapy.  Developed fevers up to 101.8F on 3/17/2018 and was transferred to [facility] for further management.      No culture data available from OSH.  Most recent cultures here have shown MRSA/Pseudomonas in 2017.  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    Will continue current IV therapy with Vanc/Merrem--pre medication with IV benadryl prior to Vanc.  Will administer one time dose of Tobramycin today.  CT sinuses and CXR PA/Lat ordered.   Follow-up procalcitonin, respiratory culture, viral pathogens panel, CMV PCR, aspergillus ag, and fungitell.  ID consulted.             Lung transplant status, bilateral    Received BLT on 6/12/2014 for cystic fibrosis.  Has known CARLOS EDUARDO.    Continue immunosuppressives and prophylactic antibiotics.          Immunosuppression    On Tacrolimus 3.5mg BID, MMF 500mg BID, and Prednisone 5mg as an outpatient.      Will continue Tacrolimus at 3.5mg BID and Prednisone 5mg for now.  Hold MMF in the setting of acute infection.  Will follow daily Tacrolimus levels and adjust as needed.          Prophylactic antibiotic    On Dapsone 100mg daily as an outpatient.  CMV D+/R- with hx of viremia.     Continue Dapsone.  Will check CMV PCR.         Bronchiolitis obliterans syndrome    Has known CARLOS EDUARDO but remains with good functional status and does not require supplemental oxygen.    No further interventions at this time.        CF related Pancreatic insufficiency    On Creon with meals.    Continue with pancreatic enzymes with meals.  BG monitoring and low dose SSI ordered.  Endocrine consulted.          Anxiety disorder    Continue home medications which include Abilify and Ativan.  Remeron nightly for sleep.        Chronic pain with opiate use    Will continue home medications which include Gabapentin 1100mg TID, MS Contin 15mg TID, Oxycodone 10mg prn, Tizanidine 4mg.        Headache    Continue with Topamax.        Hypertension    On Norvasc and Lisinopril at home.      Will monitor blood pressure overnight and resume tomorrow if needed.              Francisca Morin, DO  Lung Transplant  [facility]

## 2018-03-19 NOTE — PLAN OF CARE
Problem: Patient Care Overview  Goal: Plan of Care Review  Outcome: Ongoing (interventions implemented as appropriate)  Pt has call bell in reach, non slip socks on, and bedrails up x2. Pt has been encouraged to wash hands. Pt on iv antibiotics. Pt has prn pain medication and benadryl. Pt has accuchecks ac/hs. Pt comfortable in bed.

## 2018-03-19 NOTE — ASSESSMENT & PLAN NOTE
On Dapsone 100mg daily as an outpatient.  CMV D+/R- with hx of viremia.     Continue Dapsone. CMV PCR results pending, patient placed on valganciclovir 900 mg BID.

## 2018-03-19 NOTE — ASSESSMENT & PLAN NOTE
On Creon with meals.    Continue with pancreatic enzymes with meals.  BG monitoring and low dose SSI ordered.  Endocrine consulted, appreciate recs.

## 2018-03-19 NOTE — SUBJECTIVE & OBJECTIVE
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       Past Surgical History:   Procedure Laterality Date    ABDOMINAL SURGERY      peg tube     CHOLECYSTECTOMY  2016    ENDOMETRIAL ABLATION  2015    [facility]    FESS      LARYNX SURGERY  2016    LUNG TRANSPLANT Bilateral 6/12/14    MYRINGOTOMY W/ TUBES Right 04/2017    PORTACATH PLACEMENT Right     rt sc    SALPINGECTOMY Bilateral 2015    [facility]       Review of patient's allergies indicates:   Allergen Reactions    Albuterol Palpitations    Colistin Anaphylaxis    Vancomycin analogues      Infusion reaction that does not resolve with slowing    Neupogen [filgrastim] Other (See Comments)     Ostealgia after five daily doses of 300 mcg.      Bactrim [sulfamethoxazole-trimethoprim] Hives    Ceftazidime Hives     Pt stated can tolerate cefapine not ceftazidime    Ceftazidime     Dronabinol Other (See Comments)     Mental changes/hallucinations    Haldol [haloperidol lactate] Other (See Comments)     Seizure like activity    Nsaids (non-steroidal anti-inflammatory drug)      Cannot have due to lung transplant    Adhesive Rash     Cloth tape- please use tegaderm or paper tape    Aztreonam Rash    Ciprofloxacin Nausea And Vomiting     Projectile N/V, per patient.  Unwilling to retry therapy.       Medications:  Prescriptions Prior to Admission   Medication Sig    aluminum-magnesium hydroxide-simethicone (MAALOX ADVANCED) 200-200-20 mg/5 mL Susp Take 30 mLs by mouth before meals and at bedtime as needed.    amlodipine (NORVASC) 5 MG tablet Take 5 mg by mouth once daily.     aripiprazole (ABILIFY) 2 MG Tab Take 2 mg by mouth once daily.    butalbital-acetaminophen-caffeine -40 mg (FIORICET, ESGIC) -40 mg per tablet TAKE 1 TABLET BY MOUTH EVERY 6 HOURS AS NEEDED FOR HEADACHE    calcium-vitamin D3 500 mg(1,250mg) -200 unit per tablet Take 1 tablet by mouth 2 (two) times daily with meals.    CREON 24,000-76,000 -120,000 unit capsule TAKE 5 CAPSULES BY MOUTH WITH MEALS AND 4 CAPSULES WITH SNACKS EVERYDAY    dapsone 100 MG Tab TAKE 1 TABLET BY MOUTH EVERY DAY    diphenhydrAMINE (BENADRYL) 50 MG tablet Take 1 tablet (50 mg total) by mouth every 6 (six) hours as needed for Itching.    DULERA 100-5 mcg/actuation HFAA INHALE 1 PUFF BY MOUTH TWICE DAILY (Patient taking differently: INHALE 2 PUFF BY MOUTH TWICE DAILY)    DULERA 100-5 mcg/actuation HFAA INHALE 1 PUFF BY MOUTH TWICE DAILY    fexofenadine (ALLEGRA) 180 MG tablet Take 180 mg by mouth once daily.    fluconazole (DIFLUCAN) 150 MG Tab TAKE 1 TABLET BY MOUTH EVERY WEEK (Patient taking differently: TAKE 1 TABLET BY MOUTH EVERY WEEK PRN VAGINAL YEAST S/S)    fluticasone (FLONASE) 50 mcg/actuation nasal spray INSERT 2 SPRAYS IN EACH NOSTRIL DAILY    folic acid (FOLVITE) 1 MG tablet Take 1 tablet (1,000 mcg total) by mouth once daily.    gabapentin (NEURONTIN) 300 MG capsule Take 1 capsule (300 mg total) by mouth 3 (three) times daily. (Patient taking differently: Take 1,100 mg by mouth 3 (three) times daily. )    insulin glargine, TOUJEO, (TOUJEO) 300 unit/mL (1.5 mL) InPn pen Inject 3 Units into the skin once daily.     levalbuterol (XOPENEX HFA) 45 mcg/actuation inhaler INHALE 1 TO 2 PUFFS INTO THE LUNGS EVERY 6 HOURS AS NEEDED FOR WHEEZING OR SHORTNESS OF BREATH. USE WITH SPACER.    lorazepam (ATIVAN) 2 MG Tab Take 1 tablet (2 mg total) by mouth every 12 (twelve) hours as needed (for anxiety).    magnesium oxide (MAG-OX) 400 mg tablet Take 1 tablet (400 mg total) by mouth 2 (two) times daily.    mirtazapine  (REMERON) 30 MG tablet Take 30 mg by mouth every evening.    montelukast (SINGULAIR) 10 mg tablet TAKE 1 TABLET BY MOUTH EVERY DAY    morphine (MS CONTIN) 15 MG 12 hr tablet Take 15 mg by mouth 3 (three) times daily.    mv. min cmb#[id number] (AQUADEKS) 100-700-10 mcg-mcg-mg Cap cap Take 1 capsule by mouth 2 (two) times daily.    mycophenolate (CELLCEPT) 250 mg Cap Take 2 capsules (500 mg total) by mouth 2 (two) times daily.    omeprazole (PRILOSEC) 40 MG capsule TAKE 1 CAPSULE BY MOUTH EVERY MORNING    ondansetron (ZOFRAN) 8 MG tablet Take 1 tablet (8 mg total) by mouth every 8 (eight) hours as needed.    ondansetron (ZOFRAN) 8 MG tablet TAKE 1 TABLET(8 MG) BY MOUTH EVERY 8 HOURS AS NEEDED    oxycodone (ROXICODONE) 5 MG immediate release tablet Take 10 mg by mouth every 4 (four) hours as needed for Pain.    polyethylene glycol (GLYCOLAX) 17 gram PwPk Take 17 g by mouth 2 (two) times daily.    predniSONE (DELTASONE) 5 MG tablet Take 5 mg by mouth once daily.    PROCHAMBER USE AS DIRECTED    promethazine (PHENERGAN) 25 MG tablet TAKE 1 TABLET BY MOUTH EVERY 8 HOURS AS NEEDED FOR NAUSEA    tacrolimus (PROGRAF) 0.5 MG Cap Take 1 capsule (0.5 mg total) by mouth every 12 (twelve) hours.    tacrolimus (PROGRAF) 1 MG Cap Take 3 capsules (3 mg total) by mouth every 12 (twelve) hours. Daily doses: 3.5 mg every 12 hours.    tiZANidine (ZANAFLEX) 4 MG tablet TAKE 2 TABLETS BY MOUTH EVERY 6 HOURS AS NEEDED     Antibiotics     Start     Stop Route Frequency Ordered    03/19/18 0800  dapsone tablet 100 mg      -- Oral Daily 03/18/18 1834    03/18/18 2000  vancomycin 1 gram/250 mL in sodium chloride 0.9% IVPB 1 g  (Vancomycin IVPB with levels panel)      -- IV Every 12 hours (non-standard times) 03/18/18 1905 03/18/18 2000  meropenem injection 1 g      -- IV Every 8 hours (non-standard times) 03/18/18 1905        Antifungals     None        Antivirals         Stop Route Frequency     valGANciclovir      -- Oral  2 times daily           Immunization History   Administered Date(s) Administered    Cytomegalovirus Immune Globulin 06/13/2014       Family History     Problem Relation (Age of Onset)    Drug abuse Maternal Grandfather    Thrombocytopenia Maternal Grandfather        Social History     Social History    Marital status: Single     Spouse name: N/A    Number of children: N/A    Years of education: N/A     Social History Main Topics    Smoking status: Never Smoker    Smokeless tobacco: Never Used    Alcohol use No      Comment: Has not had an alcoholic drink in more than 2 months.    Drug use: No    Sexual activity: Yes     Partners: Male     Birth control/ protection: Implant     Other Topics Concern    None     Social History Narrative    None     Review of Systems   Constitutional: Positive for chills, fatigue and fever. Negative for activity change, appetite change and diaphoresis.   HENT: Positive for congestion and postnasal drip. Negative for dental problem, drooling, ear discharge, ear pain, facial swelling, hearing loss, mouth sores, nosebleeds, rhinorrhea, sinus pain, sinus pressure, sneezing, sore throat and voice change.    Eyes: Negative for photophobia, pain, redness, itching and visual disturbance.   Respiratory: Positive for shortness of breath. Negative for cough, choking, chest tightness, wheezing and stridor.    Cardiovascular: Negative for chest pain, palpitations and leg swelling.   Gastrointestinal: Negative for abdominal distention, abdominal pain, constipation, diarrhea, nausea and vomiting.   Endocrine: Negative for cold intolerance, heat intolerance, polydipsia, polyphagia and polyuria.   Genitourinary: Negative for difficulty urinating, dysuria, flank pain, frequency and urgency.   Musculoskeletal: Negative for arthralgias, back pain, gait problem, myalgias, neck pain and neck stiffness.   Skin: Negative for color change and rash.   Allergic/Immunologic: Positive for  immunocompromised state.   Neurological: Negative for dizziness, tremors, seizures, syncope, weakness, light-headedness, numbness and headaches.   Hematological: Negative for adenopathy.   Psychiatric/Behavioral: Negative for agitation and confusion.     Objective:     Vital Signs (Most Recent):  Temp: 98.7 °F (37.1 °C) (03/19/18 0730)  Pulse: 88 (03/19/18 1140)  Resp: 18 (03/19/18 1140)  BP: 105/63 (03/19/18 1140)  SpO2: (!) 92 % (03/19/18 1140) Vital Signs (24h Range):  Temp:  [98.4 °F (36.9 °C)-98.9 °F (37.2 °C)] 98.7 °F (37.1 °C)  Pulse:  [] 88  Resp:  [16-20] 18  SpO2:  [91 %-93 %] 92 %  BP: ()/(56-70) 105/63     Weight: 60 kg (132 lb 4.4 oz)  Body mass index is 24.99 kg/m².    Estimated Creatinine Clearance: 76.7 mL/min (based on SCr of 0.9 mg/dL).    Physical Exam   Constitutional: She is oriented to person, place, and time. She appears well-developed and well-nourished. No distress.   HENT:   Head: Normocephalic and atraumatic.   Right Ear: External ear normal.   Left Ear: External ear normal.   Nose: Nose normal.   Mouth/Throat: Oropharynx is clear and moist. No oropharyngeal exudate.   Eyes: Conjunctivae and EOM are normal. Pupils are equal, round, and reactive to light. Right eye exhibits no discharge. Left eye exhibits no discharge. No scleral icterus.   Neck: Normal range of motion. Neck supple. No JVD present. No tracheal deviation present. No thyromegaly present.   Cardiovascular: Normal rate, regular rhythm, normal heart sounds and intact distal pulses.  Exam reveals no gallop and no friction rub.    No murmur heard.  Pulmonary/Chest: Effort normal. No accessory muscle usage. No tachypnea. No respiratory distress. She has no wheezes. She has no rhonchi. She has rales in the right lower field and the left lower field. She exhibits no tenderness.   Abdominal: Soft. Bowel sounds are normal. She exhibits no distension. There is no tenderness. There is no rebound and no guarding.    Musculoskeletal: Normal range of motion. She exhibits no edema, tenderness or deformity.   Lymphadenopathy:     She has no cervical adenopathy.   Neurological: She is alert and oriented to person, place, and time. No cranial nerve deficit.   Skin: Skin is warm and dry. No rash noted. She is not diaphoretic. No erythema. No pallor.   Psychiatric: She has a normal mood and affect. Judgment normal.   Nursing note and vitals reviewed.      Significant Labs:   CBC:   Recent Labs  Lab 03/18/18 1918 03/19/18  0500   WBC 7.67 6.71   HGB 8.9* 8.9*   HCT 29.3* 29.6*   * 132*     CMP:   Recent Labs  Lab 03/18/18 1918 03/19/18  0500     137 138   K 4.7  4.7 4.1     106 109   CO2 22*  22* 20*   *  136* 69*   BUN 14  14 13   CREATININE 0.9  0.9 0.9   CALCIUM 9.0  9.0 8.1*   PROT 6.8  --    ALBUMIN 3.3*  --    BILITOT 0.5  --    ALKPHOS 132  --    AST 37  --    ALT 28  --    ANIONGAP 9  9 9   EGFRNONAA >60.0  >60.0 >60.0       Significant Imaging: I have reviewed all pertinent imaging results/findings within the past 24 hours.     CT sinuses:    The frontal sinuses are hypoplastic bilaterally with essentially complete bilateral opacification.    There is postoperative change of prior partial ethmoidectomies and bilateral uncinectomies.  There is significant severe  opacification of the residual ethmoid air cells, similar to prior examination.  The sphenoid sinuses are also hypoplastic bilaterally with mild/moderate mucosal thickening of the right ethmoid sinus, increased from prior examination.  There is essentially complete opacification of the left sphenoid sinus, the degree of which is also increased from prior CT examination of 03/30/2017.    There is significant mucosal thickening and lobular opacification of the bilateral maxillary sinuses, with the essentially complete opacification of the right maxillary antrum.  There is intermixed hyperdensity within sinonasal opacity, which can be  seen with inspissated secretions versus fungal colonization.    There is leftward nasal septal deviation and small leftward projecting nasal spur.  Roofs of the ethmoids are relatively symmetric.  The lamina papyracea appear intact bilaterally.    The globes and orbits appear within normal limits.  The visualized mastoid air cells are essentially clear.   Impression       Postoperative change of prior partial ethmoidectomies and bilateral uncinectomies with severe paranasal sinus opacification as detailed above.     Microbiology:  3/19 sputum cx: pending

## 2018-03-19 NOTE — HPI
"Juanita Ibarra is a 27 y/o woman with a hx of BLT on 6/12/2014 for a diagnosis of cystic fibrosis.  Her post transplant course has been complicated by Candida Glabrata positive sputum, Pseudomonas PNA in 2015, CMV viremia in 7/2015, and CARLOS EDUARDO grade 3.  She was last seen in clinic in 1/2018.  Was doing well with stable dyspnea and not requiring any supplemental oxygen.    Since then, she presented to her PCP earlier this month for evaluation of sinusitis.  She states that with the cooler/rainy weather, she developed some sinus congestion.  She was not having a cough or dyspnea above her baseline at that time.  She was admitted to the hospital on 3/6/2018 for a 14 day course of IV abx.  During her hospital course, she reports having improved sinus congestion with Vanc/Merrem.  She felt like her sinuses, cough, and dyspnea were all back to baseline until hospital day 11 when she began having fevers, chills, and nightsweats.  It appears from records that her Tmax was 101.8F.  At that time, she also developed some increased dyspnea and a cough which is occasionally productive of white sputum.  She states that she "just doesn't feel right" and that the last time she felt like this was when she developed CMV viremia.  She has no known sick contacts but has been in an OSH for the last 11 days.      Regimen at the OSH included Vanc/Merrem and she was given a one time dose of Tobramycin on 3/17/2018.  Uncertain of any positive cultures or imaging results.         "

## 2018-03-19 NOTE — HPI
Ms. Ibarra is a 29yo woman w/a history of CF (c/b pancreatic insufficiency, sinus disease, MRSA/Pseudomonas colonization c/b numerous antibiotics allergies, and subsequent ESLD; s/p BOLT 6/12/2014, CMV D+/R-, simulect induction, on maintenance tacro/MMF/pred; c/b multiple episodes of MRSA/Pseudomonas pneumonia from 3956-0235, C.glabrata colonization 7/2014, A1 rejection 8/2014, s/p steroids, CMV reactivation [id number], and subsequent grade 3 CARLOS EDUARDO) who was admitted on 3/19/2018 as a transfer from an OSH for further care of pansinusitis. She was first seen by her PCP with sinus congestion she attributed to seasonal allergies at the beginning of 3/2018 but was then admitted to an OSH in [place] on 3/6/2018 when she developed purulent sinus congestion, associated CARROLL, and productive cough. She was treated with an empiric vanc/meropenem course for pansinusitis with clinical improvement until HD #11 when she developed F/C/S (Tm 101.8F), worsening non-productive cough, and SOB with an increasing oxygen requirement. Tobramycin was added to her current regimen prior to [place] [facility] [place] with improvement in her fever curve but not other symptoms to date. She feels that her symptoms somewhat resemble prior episodes of CMV reactivations she has had. She has been afebrile in house without a leukocytosis. Procalcitonin is not elevated and CT sinuses reveals pansinusitis with some concern of fungal colonization in some areas radiographically. No sinus cultures were obtained at the OSH facility and ENT evaluation here with endoscopy was unremarkable per patient report. Bronchoscopy is planned for tomorrow.

## 2018-03-19 NOTE — SUBJECTIVE & OBJECTIVE
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       Past Surgical History:   Procedure Laterality Date    ABDOMINAL SURGERY      peg tube     CHOLECYSTECTOMY  2016    ENDOMETRIAL ABLATION  2015    [facility]    FESS      LARYNX SURGERY  2016    LUNG TRANSPLANT Bilateral 6/12/14    MYRINGOTOMY W/ TUBES Right 04/2017    PORTACATH PLACEMENT Right     rt sc    SALPINGECTOMY Bilateral 2015    [facility]       Review of patient's allergies indicates:   Allergen Reactions    Albuterol Palpitations    Colistin Anaphylaxis    Vancomycin analogues      Infusion reaction that does not resolve with slowing    Neupogen [filgrastim] Other (See Comments)     Ostealgia after five daily doses of 300 mcg.      Bactrim [sulfamethoxazole-trimethoprim] Hives    Ceftazidime Hives     Pt stated can tolerate cefapine not ceftazidime    Ceftazidime     Dronabinol Other (See Comments)     Mental changes/hallucinations    Haldol [haloperidol lactate] Other (See Comments)     Seizure like activity    Nsaids (non-steroidal anti-inflammatory drug)      Cannot have due to lung transplant    Adhesive Rash     Cloth tape- please use tegaderm or paper tape    Aztreonam Rash    Ciprofloxacin Nausea And Vomiting     Projectile N/V, per patient.  Unwilling to retry therapy.       PTA Medications   Medication Sig    aluminum-magnesium hydroxide-simethicone (MAALOX ADVANCED) 200-200-20 mg/5 mL Susp Take 30 mLs by mouth before meals and at bedtime as needed.    amlodipine (NORVASC) 5 MG tablet Take 5 mg by mouth once daily.    aripiprazole (ABILIFY) 2 MG Tab  Take 2 mg by mouth once daily.    butalbital-acetaminophen-caffeine -40 mg (FIORICET, ESGIC) -40 mg per tablet TAKE 1 TABLET BY MOUTH EVERY 6 HOURS AS NEEDED FOR HEADACHE    calcium-vitamin D3 500 mg(1,250mg) -200 unit per tablet Take 1 tablet by mouth 2 (two) times daily with meals.    CREON 24,000-76,000 -120,000 unit capsule TAKE 5 CAPSULES BY MOUTH WITH MEALS AND 4 CAPSULES WITH SNACKS EVERYDAY    dapsone 100 MG Tab TAKE 1 TABLET BY MOUTH EVERY DAY    diphenhydrAMINE (BENADRYL) 50 MG tablet Take 1 tablet (50 mg total) by mouth every 6 (six) hours as needed for Itching.    DULERA 100-5 mcg/actuation HFAA INHALE 1 PUFF BY MOUTH TWICE DAILY (Patient taking differently: INHALE 2 PUFF BY MOUTH TWICE DAILY)    DULERA 100-5 mcg/actuation HFAA INHALE 1 PUFF BY MOUTH TWICE DAILY    fexofenadine (ALLEGRA) 180 MG tablet Take 180 mg by mouth once daily.    fluconazole (DIFLUCAN) 150 MG Tab TAKE 1 TABLET BY MOUTH EVERY WEEK (Patient taking differently: TAKE 1 TABLET BY MOUTH EVERY WEEK PRN VAGINAL YEAST S/S)    fluticasone (FLONASE) 50 mcg/actuation nasal spray INSERT 2 SPRAYS IN EACH NOSTRIL DAILY    folic acid (FOLVITE) 1 MG tablet Take 1 tablet (1,000 mcg total) by mouth once daily.    gabapentin (NEURONTIN) 300 MG capsule Take 1 capsule (300 mg total) by mouth 3 (three) times daily. (Patient taking differently: Take 1,100 mg by mouth 3 (three) times daily. )    insulin glargine, TOUJEO, (TOUJEO) 300 unit/mL (1.5 mL) InPn pen Inject 3 Units into the skin once daily.     levalbuterol (XOPENEX HFA) 45 mcg/actuation inhaler INHALE 1 TO 2 PUFFS INTO THE LUNGS EVERY 6 HOURS AS NEEDED FOR WHEEZING OR SHORTNESS OF BREATH. USE WITH SPACER.    lorazepam (ATIVAN) 2 MG Tab Take 1 tablet (2 mg total) by mouth every 12 (twelve) hours as needed (for anxiety).    magnesium oxide (MAG-OX) 400 mg tablet Take 1 tablet (400 mg total) by mouth 2 (two) times daily.    mirtazapine (REMERON) 30 MG tablet Take 30 mg  by mouth every evening.    montelukast (SINGULAIR) 10 mg tablet TAKE 1 TABLET BY MOUTH EVERY DAY    morphine (MS CONTIN) 15 MG 12 hr tablet Take 15 mg by mouth 3 (three) times daily.    mv. min cmb#[id number] (AQUADEKS) 100-700-10 mcg-mcg-mg Cap cap Take 1 capsule by mouth 2 (two) times daily.    mycophenolate (CELLCEPT) 250 mg Cap Take 2 capsules (500 mg total) by mouth 2 (two) times daily.    omeprazole (PRILOSEC) 40 MG capsule TAKE 1 CAPSULE BY MOUTH EVERY MORNING    ondansetron (ZOFRAN) 8 MG tablet Take 1 tablet (8 mg total) by mouth every 8 (eight) hours as needed.    ondansetron (ZOFRAN) 8 MG tablet TAKE 1 TABLET(8 MG) BY MOUTH EVERY 8 HOURS AS NEEDED    oxycodone (ROXICODONE) 5 MG immediate release tablet Take 10 mg by mouth every 4 (four) hours as needed for Pain.    polyethylene glycol (GLYCOLAX) 17 gram PwPk Take 17 g by mouth 2 (two) times daily.    predniSONE (DELTASONE) 5 MG tablet Take 5 mg by mouth once daily.    PROCHAMBER USE AS DIRECTED    promethazine (PHENERGAN) 25 MG tablet TAKE 1 TABLET BY MOUTH EVERY 8 HOURS AS NEEDED FOR NAUSEA    tacrolimus (PROGRAF) 0.5 MG Cap Take 1 capsule (0.5 mg total) by mouth every 12 (twelve) hours.    tacrolimus (PROGRAF) 1 MG Cap Take 3 capsules (3 mg total) by mouth every 12 (twelve) hours. Daily doses: 3.5 mg every 12 hours.    tiZANidine (ZANAFLEX) 4 MG tablet TAKE 2 TABLETS BY MOUTH EVERY 6 HOURS AS NEEDED     Family History     Problem Relation (Age of Onset)    Drug abuse Maternal Grandfather    Thrombocytopenia Maternal Grandfather        Social History Main Topics    Smoking status: Never Smoker    Smokeless tobacco: Never Used    Alcohol use No      Comment: Has not had an alcoholic drink in more than 2 months.    Drug use: No    Sexual activity: Yes     Partners: Male     Birth control/ protection: Implant     Review of Systems   Constitutional: Positive for chills, fatigue and fever. Negative for activity change, appetite change and  diaphoresis.   HENT: Positive for congestion. Negative for dental problem, drooling, ear discharge, ear pain, facial swelling, hearing loss, mouth sores, nosebleeds, postnasal drip, rhinorrhea, sinus pain, sinus pressure, sneezing, sore throat and voice change.    Eyes: Negative for photophobia, pain, redness, itching and visual disturbance.   Respiratory: Positive for shortness of breath. Negative for cough, choking, chest tightness, wheezing and stridor.    Cardiovascular: Negative for chest pain, palpitations and leg swelling.   Gastrointestinal: Negative for abdominal distention, abdominal pain, constipation, diarrhea, nausea and vomiting.   Endocrine: Negative for cold intolerance, heat intolerance, polydipsia, polyphagia and polyuria.   Genitourinary: Negative for difficulty urinating, dysuria, flank pain, frequency and urgency.   Musculoskeletal: Negative for arthralgias, back pain, gait problem, myalgias, neck pain and neck stiffness.   Skin: Negative for color change and rash.   Allergic/Immunologic: Positive for immunocompromised state.   Neurological: Negative for dizziness, tremors, seizures, syncope, weakness, light-headedness, numbness and headaches.   Hematological: Negative for adenopathy.   Psychiatric/Behavioral: Negative for agitation and confusion.     Objective:     Vital Signs (Most Recent):  Temp: 98.9 °F (37.2 °C) (03/18/18 1742)  Pulse: 107 (03/18/18 1742)  Resp: 20 (03/18/18 1742)  BP: 107/70 (03/18/18 1742)  SpO2: (!) 93 % (03/18/18 1742) Vital Signs (24h Range):  Temp:  [98.9 °F (37.2 °C)] 98.9 °F (37.2 °C)  Pulse:  [107] 107  Resp:  [20] 20  SpO2:  [93 %] 93 %  BP: (107)/(70) 107/70     Weight: 58 kg (127 lb 13.9 oz)  Body mass index is 24.16 kg/m².    No intake or output data in the 24 hours ending 03/18/18 1935    Physical Exam   Constitutional: She is oriented to person, place, and time. She appears well-developed and well-nourished. No distress.   HENT:   Head: Normocephalic and  atraumatic.   Right Ear: External ear normal.   Left Ear: External ear normal.   Nose: Nose normal.   Mouth/Throat: Oropharynx is clear and moist. No oropharyngeal exudate.   Eyes: Conjunctivae and EOM are normal. Pupils are equal, round, and reactive to light. Right eye exhibits no discharge. Left eye exhibits no discharge. No scleral icterus.   Neck: Normal range of motion. Neck supple. No JVD present. No tracheal deviation present. No thyromegaly present.   Cardiovascular: Normal rate, regular rhythm, normal heart sounds and intact distal pulses.  Exam reveals no gallop and no friction rub.    No murmur heard.  Pulmonary/Chest: Effort normal. No accessory muscle usage. No tachypnea. No respiratory distress. She has no wheezes. She has no rhonchi. She has rales in the right lower field. She exhibits no tenderness.   Musculoskeletal: Normal range of motion. She exhibits no edema, tenderness or deformity.   Lymphadenopathy:     She has no cervical adenopathy.   Neurological: She is alert and oriented to person, place, and time. No cranial nerve deficit.   Skin: Skin is warm and dry. No rash noted. She is not diaphoretic. No erythema. No pallor.   Psychiatric: She has a normal mood and affect. Judgment normal.       Significant Labs:  CBC:  No results for input(s): WBC, RBC, HGB, HCT, PLT, MCV, MCH, MCHC in the last 72 hours.  BMP:  No results for input(s): GLUCOSE, NA, K, CL, CO2, BUN, CREATININE, CALCIUM in the last 72 hours.     Labs pending    Diagnostic Results:  CT sinuses, CXR PA/Lat ordered

## 2018-03-19 NOTE — PROGRESS NOTES
Patient seen with Dr. Alvarez, Dr. Morin, Dr. Leigh, Reyes Will, NP, Shama Canales PA-C, and Yasmani Rain, PharmD.    Plan of care reviewed with her as follows:  1.  Continue current IV antibiotic regimen for treatment of pneumonia while awaiting ID recommendations.  2.  ENT consult for evaluation of sinus disease.  3.  NPO after midnight for bronchoscopy in the OR tomorrow.  Informed patient we will update re: the time as soon as the procedure is scheduled.  Transplant coordinator will continue to follow with the multi-disciplinary team, and remains available to assist with patient/family care and education.

## 2018-03-19 NOTE — ASSESSMENT & PLAN NOTE
On Tacrolimus 3.5mg BID, MMF 500mg BID, and Prednisone 5mg as an outpatient.      Will continue Tacrolimus at 3.5mg BID and Prednisone 5mg for now.  Hold MMF in the setting of acute infection.  Will follow daily Tacrolimus levels and adjust as needed.

## 2018-03-19 NOTE — HPI
"Juanita Ibarra (Tori) is a patient well-known to the ENT service with a history of CF and lung transplant, sinusitis, and otitis media.  She gives the following history about this episode:    4 weeks ago began with facial pressure and nasal discharge after a spell of rainy weather.  Saw her PCP and was prescribed Doxycycline with improvement.  Immediately after finishing course of Doxycycline had recurrence of thick nasal drainage and facial pressure and was admitted for IV antibiotics.  First cefepime, then Vanc/Merrem.  Initially had a lot of improvement in her sinus symptoms on IV antibiotics, but then (On day 10/11) had fever and productive cough.  She was transferred here for further management.    ENT is consulted for sinusitis.  "

## 2018-03-19 NOTE — ASSESSMENT & PLAN NOTE
Patient seen and examined with Dr. Olsen  Do not believe there is an active sinus infection currently  Agree with plan for bronchoscopy tomorrow  Continue antibiotics per primary team pending cultures  Would like to have a repeat nasal culture, ENT will obtain

## 2018-03-19 NOTE — PLAN OF CARE
Problem: Patient Care Overview  Goal: Plan of Care Review  Outcome: Ongoing (interventions implemented as appropriate)  Pt aao x 4. Pt currently in bed with bed in lowest/locked position. Call light in reach. Free from falls/injury this shift and wearing non-skid footwear when ambulating. Family at bedside. PO oxy 10 mg given for c/o pain q 6hrs PRN. IV benadryl given before IV vanc. Pt did not eat breakfast or lunch d/t c/o nausea. BG WNL. Pt refused PRN nausea medication. Urine sample pending, pt aware of sample needed. Inpt consult to ID and ENT. NPO at midnight for bronch in morning, patient aware of POC.     Will continue to monitor, assess, and adjust care as needed.

## 2018-03-19 NOTE — ASSESSMENT & PLAN NOTE
Received BLT on 6/12/2014 for cystic fibrosis.  Has known CARLOS EDUARDO.    Continue immunosuppressives and prophylactic antibiotics.

## 2018-03-19 NOTE — ASSESSMENT & PLAN NOTE
Admitted in [place] on 3/6/2018 for acute sinusitis and was to complete 14 days of inpatient IV therapy.  Developed fevers up to 101.8F on 3/17/2018 and was transferred to [facility] for further management.      No culture data available from OSH.  Most recent cultures here have shown MRSA/Pseudomonas in 2017.  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    Will continue current IV therapy with Vanc/Merrem--pre medication with IV benadryl prior to Vanc.  Will administer one time dose of Tobramycin today.  CT sinuses and CXR PA/Lat ordered.  Follow-up procalcitonin, respiratory culture, viral pathogens panel, CMV PCR, aspergillus ag, and fungitell.  ID consulted.

## 2018-03-19 NOTE — ASSESSMENT & PLAN NOTE
Has known CARLOS EDUARDO grade 3 but remains with good functional status and does not require supplemental oxygen.    No further interventions at this time.

## 2018-03-19 NOTE — ANESTHESIA PREPROCEDURE EVALUATION
[facility]  Anesthesia Pre-Operative Evaluation         Patient Name: Juanita Ibarra  YOB: 1990  MRN: [id number]    SUBJECTIVE:     Pre-operative evaluation for Procedure(s) (LRB):  BRONCHOSCOPY (N/A)     03/19/2018    Juanita Ibarra is a 28 y.o. female w/ a significant PMHx of Anxiety, chronic pain, dysphagia, laryngeal reconstruction, chronic immunosuppression, pancreatic insufficiency, DM, and BLT on 6/12/2014 2/2 sequale of cystic fibrosis.  Her post transplant course has been complicated by Candida Glabrata positive sputum, Pseudomonas PNA in 2015, CMV viremia in 7/2015, and CARLOS EDUARDO grade 3. She presented to her PCP earlier this month for evaluation of sinusitis. She was admitted on 3/6/2018 for a 14 day course of IV abx. Regimen at the OSH included Vanc/Merrem and she was given a one time dose of Tobramycin on 3/17/2018. She now presents for the above procedure(s).    Extensive discussion with patient about her PONV and post-op itching.  She states that she always gets TIVA and has PONV with that.  She states that ondansetron has no effect and that only IV phenergan works.  She states that she has itching after anesthesia, not sure how much opioids she gets with her procedures but she states that IV benadryl works best.  She states that she has a high tolerance and that once she's awake from anesthesia, she will not fall back asleep with IV phenergan and IV benadryl.  She states that if she gets IV phenergan/benadryl before wakeup then she takes longer to wakeup.      LDA:        Port A Cath Single Lumen 08/02/16 0856 right subclavian (Active)   Accessed by: Thania  4/17/2017  8:15 AM   Dressing Type Gauze 3/18/2018  8:04 PM   Dressing Status Clean;Dry;Intact 3/18/2018  8:04 PM   Dressing Intervention New dressing 4/25/2017  7:45 PM   Dressing Change Due 03/20/18 3/18/2018  5:42 PM   Line Status Saline locked 3/18/2018  5:42 PM   Flush Performed Yes 3/18/2018   5:42 PM   Daily Line Review Performed 3/18/2018  5:42 PM   Type of Needle Nicole 4/17/2017  8:15 AM   Gauge 20 4/17/2017  8:15 AM   Needle Length 3/4 in 4/17/2017  8:15 AM   Needle Status Left in place 4/17/2017  8:15 AM   Needle Insertion Date 04/15/17 4/15/2017  7:24 PM   Needle Insertion Time 1212 4/15/2017  7:24 PM   Needle Removal Date 04/15/17 4/15/2017  9:37 AM   Needle Removal Time 0945 4/15/2017  9:37 AM   Number of days: 594       Prev airway:     Present Prior to Hospital Arrival?: No; Placement Date: 04/25/17; Placement Time: 0729; Method of Intubation: Alejandre; Inserted by: CRNA; Airway Device: Endotracheal Tube; Mask Ventilation: Easy; Intubated: Postinduction; Blade: Ej #3; Airway Device Size: 6.0; Style: Cuffed; Cuff Inflation: Minimal occlusive pressure; Inflation Amount: 5; Placement Verified By: Auscultation, Capnometry; Grade: Grade I; Complicating Factors: None; Intubation Findings: Positive EtCO2, Bilateral breath sounds, Atraumatic/Condition of teeth unchanged;  Depth of Insertion: 19; Securment: Lips; Complications: None; Breath Sounds: Equal Bilateral; Insertion Attempts: 1; Removal Date: 04/25/17;  Removal Time: 0904    Drips: None documented.         Patient Active Problem List   Diagnosis    Cystic fibrosis with pulmonary manifestations    Sinusitis, chronic    Anxiety disorder    Other pain disorders related to psychological factors    Nausea    Protein calorie malnutrition    CF related Pancreatic insufficiency    Chronic pain with opiate use    Allergy to multiple antibiotics    Dysphagia, oropharyngeal    Chronic visceral pain (pleura)    Transaminitis    Gallstones    Hyperglycemia    Adverse effect of glucocorticoid or synthetic analogue    Immunosuppression    Lung transplant status, bilateral    Dysphonia    Constipation    Portacath in place    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    Prophylactic antibiotic    Complication of transplanted lung     Acute rejection of lung transplant    Debility    Muscle spasm of back    Chronic anemia    Headache    Aftercare following organ transplant    Leukopenia    SBO (small bowel obstruction)    Sterilization    Lung replaced by transplant    Fever    Cytomegalovirus (CMV) viremia    Pneumonia    Hyperkalemia    Acute kidney injury    Other complications of lung transplant    S/P lung transplant    Bilious vomiting with nausea    Acute intractable headache    Pneumonia, organism unspecified(486)    Lymphadenopathy    Biliary colic    Unilateral complete vocal fold paralysis    Bronchiolitis obliterans syndrome    Chronic ethmoidal sinusitis    Mastoiditis    Acute mucoid otitis media of right ear    Dysmenorrhea    Sinus infection    Hypertension       Review of patient's allergies indicates:   Allergen Reactions    Albuterol Palpitations    Colistin Anaphylaxis    Vancomycin analogues      Infusion reaction that does not resolve with slowing    Neupogen [filgrastim] Other (See Comments)     Ostealgia after five daily doses of 300 mcg.      Bactrim [sulfamethoxazole-trimethoprim] Hives    Ceftazidime Hives     Pt stated can tolerate cefapine not ceftazidime    Ceftazidime     Dronabinol Other (See Comments)     Mental changes/hallucinations    Haldol [haloperidol lactate] Other (See Comments)     Seizure like activity    Nsaids (non-steroidal anti-inflammatory drug)      Cannot have due to lung transplant    Adhesive Rash     Cloth tape- please use tegaderm or paper tape    Aztreonam Rash    Ciprofloxacin Nausea And Vomiting     Projectile N/V, per patient.  Unwilling to retry therapy.       Current Inpatient Medications:   ARIPiprazole  2 mg Oral QHS    calcium-vitamin D3  1 tablet Oral BID    dapsone  100 mg Oral Daily    docusate sodium  100 mg Oral BID    fluticasone  2 spray Each Nare Daily    folic acid  1 mg Oral Daily    gabapentin  1,100 mg Oral TID     lipase-protease-amylase 24,000-76,000-120,000 units  6 capsule Oral TID WM    magnesium oxide  400 mg Oral BID    meropenem (MERREM) IVPB  1 g Intravenous Q8H    mirtazapine  45 mg Oral QHS    montelukast  10 mg Oral Daily    morphine  15 mg Oral TID    multivitamin  1 tablet Oral Daily    pantoprazole  40 mg Oral Daily    predniSONE  5 mg Oral Daily    tacrolimus  3.5 mg Oral BID    topiramate  25 mg Oral BID    valGANciclovir  900 mg Oral BID    vancomycin (VANCOCIN) IVPB  1,000 mg Intravenous Q12H       No current facility-administered medications on file prior to encounter.      Current Outpatient Prescriptions on File Prior to Encounter   Medication Sig Dispense Refill    aluminum-magnesium hydroxide-simethicone (MAALOX ADVANCED) 200-200-20 mg/5 mL Susp Take 30 mLs by mouth before meals and at bedtime as needed. 148 mL 2    amlodipine (NORVASC) 5 MG tablet Take 5 mg by mouth once daily.      aripiprazole (ABILIFY) 2 MG Tab Take 2 mg by mouth once daily.      butalbital-acetaminophen-caffeine -40 mg (FIORICET, ESGIC) -40 mg per tablet TAKE 1 TABLET BY MOUTH EVERY 6 HOURS AS NEEDED FOR HEADACHE 120 tablet 0    calcium-vitamin D3 500 mg(1,250mg) -200 unit per tablet Take 1 tablet by mouth 2 (two) times daily with meals. 60 tablet 11    CREON 24,000-76,000 -120,000 unit capsule TAKE 5 CAPSULES BY MOUTH WITH MEALS AND 4 CAPSULES WITH SNACKS EVERYDAY 810 capsule 0    dapsone 100 MG Tab TAKE 1 TABLET BY MOUTH EVERY DAY 30 tablet 11    diphenhydrAMINE (BENADRYL) 50 MG tablet Take 1 tablet (50 mg total) by mouth every 6 (six) hours as needed for Itching. 1 tablet 0    DULERA 100-5 mcg/actuation HFAA INHALE 1 PUFF BY MOUTH TWICE DAILY (Patient taking differently: INHALE 2 PUFF BY MOUTH TWICE DAILY) 13 g 0    DULERA 100-5 mcg/actuation HFAA INHALE 1 PUFF BY MOUTH TWICE DAILY 13 g 0    fexofenadine (ALLEGRA) 180 MG tablet Take 180 mg by mouth once daily.      fluconazole (DIFLUCAN) 150  MG Tab TAKE 1 TABLET BY MOUTH EVERY WEEK (Patient taking differently: TAKE 1 TABLET BY MOUTH EVERY WEEK PRN VAGINAL YEAST S/S) 30 tablet 0    fluticasone (FLONASE) 50 mcg/actuation nasal spray INSERT 2 SPRAYS IN EACH NOSTRIL DAILY 16 g 5    folic acid (FOLVITE) 1 MG tablet Take 1 tablet (1,000 mcg total) by mouth once daily. 30 tablet 11    gabapentin (NEURONTIN) 300 MG capsule Take 1 capsule (300 mg total) by mouth 3 (three) times daily. (Patient taking differently: Take 1,100 mg by mouth 3 (three) times daily. ) 90 capsule 11    insulin glargine, TOUJEO, (TOUJEO) 300 unit/mL (1.5 mL) InPn pen Inject 3 Units into the skin once daily.       levalbuterol (XOPENEX HFA) 45 mcg/actuation inhaler INHALE 1 TO 2 PUFFS INTO THE LUNGS EVERY 6 HOURS AS NEEDED FOR WHEEZING OR SHORTNESS OF BREATH. USE WITH SPACER. 15 g 0    lorazepam (ATIVAN) 2 MG Tab Take 1 tablet (2 mg total) by mouth every 12 (twelve) hours as needed (for anxiety). 60 tablet 3    magnesium oxide (MAG-OX) 400 mg tablet Take 1 tablet (400 mg total) by mouth 2 (two) times daily. 60 tablet 11    mirtazapine (REMERON) 30 MG tablet Take 30 mg by mouth every evening.      montelukast (SINGULAIR) 10 mg tablet TAKE 1 TABLET BY MOUTH EVERY DAY 30 tablet 11    morphine (MS CONTIN) 15 MG 12 hr tablet Take 15 mg by mouth 3 (three) times daily.      mv. min cmb#[id number] (AQUADEKS) 100-700-10 mcg-mcg-mg Cap cap Take 1 capsule by mouth 2 (two) times daily. 60 capsule 11    mycophenolate (CELLCEPT) 250 mg Cap Take 2 capsules (500 mg total) by mouth 2 (two) times daily. 120 capsule 11    omeprazole (PRILOSEC) 40 MG capsule TAKE 1 CAPSULE BY MOUTH EVERY MORNING 30 capsule 11    ondansetron (ZOFRAN) 8 MG tablet Take 1 tablet (8 mg total) by mouth every 8 (eight) hours as needed. 30 tablet 1    ondansetron (ZOFRAN) 8 MG tablet TAKE 1 TABLET(8 MG) BY MOUTH EVERY 8 HOURS AS NEEDED 30 tablet 0    oxycodone (ROXICODONE) 5 MG immediate release tablet Take 10 mg by  mouth every 4 (four) hours as needed for Pain.      polyethylene glycol (GLYCOLAX) 17 gram PwPk Take 17 g by mouth 2 (two) times daily. 60 packet 11    predniSONE (DELTASONE) 5 MG tablet Take 5 mg by mouth once daily.      PROCHAMBER USE AS DIRECTED 1 Device 0    promethazine (PHENERGAN) 25 MG tablet TAKE 1 TABLET BY MOUTH EVERY 8 HOURS AS NEEDED FOR NAUSEA 45 tablet 0    tacrolimus (PROGRAF) 0.5 MG Cap Take 1 capsule (0.5 mg total) by mouth every 12 (twelve) hours. 60 capsule 11    tacrolimus (PROGRAF) 1 MG Cap Take 3 capsules (3 mg total) by mouth every 12 (twelve) hours. Daily doses: 3.5 mg every 12 hours. 180 capsule 11    tiZANidine (ZANAFLEX) 4 MG tablet TAKE 2 TABLETS BY MOUTH EVERY 6 HOURS AS NEEDED 90 tablet 0       Past Surgical History:   Procedure Laterality Date    ABDOMINAL SURGERY      peg tube     CHOLECYSTECTOMY  2016    ENDOMETRIAL ABLATION  2015    KJB    FESS      LARYNX SURGERY  2016    LUNG TRANSPLANT Bilateral 6/12/14    MYRINGOTOMY W/ TUBES Right 04/2017    PORTACATH PLACEMENT Right     rt sc    SALPINGECTOMY Bilateral 2015    KJB       Social History     Social History    Marital status: Single     Spouse name: N/A    Number of children: N/A    Years of education: N/A     Occupational History    Not on file.     Social History Main Topics    Smoking status: Never Smoker    Smokeless tobacco: Never Used    Alcohol use No      Comment: Has not had an alcoholic drink in more than 2 months.    Drug use: No    Sexual activity: Yes     Partners: Male     Birth control/ protection: Implant     Other Topics Concern    Not on file     Social History Narrative    No narrative on file       OBJECTIVE:     Vital Signs Range (Last 24H):  Temp:  [36.9 °C (98.4 °F)-37.2 °C (98.9 °F)]   Pulse:  []   Resp:  [16-20]   BP: ()/(56-70)   SpO2:  [91 %-93 %]       CBC:   Recent Labs      03/18/18   1918  03/19/18   0500   WBC  7.67  6.71   RBC  3.01*  3.07*   HGB  8.9*  8.9*    HCT  29.3*  29.6*   PLT  116*  132*   MCV  97  96   MCH  29.6  29.0   MCHC  30.4*  30.1*       CMP:   Recent Labs      18   1918  18   0500   NA  137  137  138   K  4.7  4.7  4.1   CL  106  106  109   CO2  22*  22*  20*   BUN  14  14  13   CREATININE  0.9  0.9  0.9   GLU  136*  136*  69*   MG  1.6  1.7   CALCIUM  9.0  9.0  8.1*   ALBUMIN  3.3*   --    PROT  6.8   --    ALKPHOS  132   --    ALT  28   --    AST  37   --    BILITOT  0.5   --        INR:  No results for input(s): PT, INR, PROTIME, APTT in the last 72 hours.    Diagnostic Studies:     EK/22/16    Test Reason : K80.50  Vent. Rate : 082 BPM     Atrial Rate : 082 BPM     P-R Int : 132 ms          QRS Dur : 084 ms      QT Int : 368 ms       P-R-T Axes : 069 074 076 degrees     QTc Int : 429 ms    Normal sinus rhythm  Possible Left atrial enlargement  mild  Nonspecific T wave abnormality  Borderline Abnormal ECG  When compared with ECG of 19-MAY-2016 11:31,  Nonspecific T wave abnormality   is present  Confirmed by ASHUTOSH ARIZA, LENNY ([id number]) on 2016 8:41:10 AM    Referred By: JOSHUA GOLDBERG           Confirmed By:LENNY BOYKIN MD    2D ECHO:  No recent studies available.     ASSESSMENT/PLAN:         Anesthesia Evaluation    I have reviewed the Patient Summary Reports.    I have reviewed the Nursing Notes.   I have reviewed the Medications.   Prednisone    Review of Systems  Anesthesia Hx:  Hx of Anesthetic complications  History of prior surgery of interest to airway management or planning: (laryngeal reconstruction) lung surgery. Previous anesthesia: General Airway issues documented on chart review include GETA  Denies Family Hx of Anesthesia complications.  Personal Hx of Anesthesia complications (post-op pruritis ), Post-Operative Nausea/Vomiting (no nausea with TIVA and prophylaxtic meds), with every anesthetic, despite treatment   Social:  Non-Smoker    Hematology/Oncology:         -- Anemia (chronic anemia):   EENT/Dental:    chronic allergic rhinitis Laryngeal reconstruction August 3, 2016 (vocal paralysis after transplant suirgery)   Otitis Media   Cardiovascular:   Hypertension, well controlled    Pulmonary:   Pneumonia Shortness of breath Cystic Fibrosis  S/P Bilateral Lung Transplant June 2014   Renal/:   Chronic Renal Disease    Hepatic/GI:   GERD, well controlled Pancreatic insufficiency as result of CF   Musculoskeletal:   chronic back pain as result of muscular injury and rib fractures as result of excessive coughing    Neurological:   Headaches Seizures (years ago, once, drug side effect)   Chronic Pain Syndrome   Endocrine:   Diabetes, well controlled    Psych:   Psychiatric History anxiety          Physical Exam  General:  Well nourished    Airway/Jaw/Neck:  Airway Findings: Mouth Opening: Small, but > 3cm General Airway Assessment: Adult  S/p laryngeal reconstruction  5.5 ETT  Mallampati: III  Improves to II with phonation.  TM Distance: Normal, at least 6 cm  Jaw/Neck Findings:  Neck ROM: Extension Decreased, Mild  Neck Findings:  Short Neck      Dental:  Dental Findings: In tact   Chest/Lungs:  Chest/Lungs Findings: Normal Respiratory Rate     Heart/Vascular:  Heart Findings: Rate: Normal  Rhythm: Regular Rhythm  Sounds: Normal  Heart murmur: negative Vascular Findings: Normal    Abdomen:  Abdomen Findings: Normal    Musculoskeletal:  Musculoskeletal Findings: Normal   Skin:  Skin Findings: Normal    Mental Status:  Mental Status Findings: (extremely knowledgeable about medical history)  Cooperative, Alert and Oriented         Anesthesia Plan  Type of Anesthesia, risks & benefits discussed:  Anesthesia Type:  general, MAC  Patient's Preference:   Intra-op Monitoring Plan: standard ASA monitors  Intra-op Monitoring Plan Comments:   Post Op Pain Control Plan: per primary service following discharge from PACU and multimodal analgesia  Post Op Pain Control Plan Comments:   Induction:   IV  Beta Blocker:  Patient is not  currently on a Beta-Blocker (No further documentation required).       Informed Consent: Patient understands risks and agrees with Anesthesia plan.  Questions answered. Anesthesia consent signed with patient.  ASA Score: 4     Day of Surgery Review of History & Physical: I have interviewed and examined the patient. I have reviewed the patient's H&P dated:  There are no significant changes.  H&P update referred to the surgeon.         Ready For Surgery From Anesthesia Perspective.

## 2018-03-19 NOTE — ASSESSMENT & PLAN NOTE
On Dapsone 100mg daily as an outpatient.  CMV D+/R- with hx of viremia.     Continue Dapsone.  Will check CMV PCR.

## 2018-03-19 NOTE — CONSULTS
"[facility]  Otorhinolaryngology-Head & Neck Surgery  Consult Note    Patient Name: Juanita Ibarra  MRN: [id number]  Code Status: Full Code  Admission Date: 3/18/2018  Hospital Length of Stay: 1 days  Attending Physician: Jake Alvarez MD  Primary Care Provider: Jake Alvarez MD    Patient information was obtained from patient and ER records.     Inpatient consult to ENT  Consult performed by: DEBRA TAPIA  Consult ordered by: SARIKA SHERMAN        Subjective:         History of Present Illness: Juanita Ibarra (Tori) is a patient well-known to the ENT service with a history of CF and lung transplant, sinusitis, and otitis media.  She gives the following history about this episode:    4 weeks ago began with facial pressure and nasal discharge after a spell of rainy weather.  Saw her PCP and was prescribed Doxycycline with improvement.  Immediately after finishing course of Doxycycline had recurrence of thick nasal drainage and facial pressure and was admitted for IV antibiotics.  First cefepime, then Vanc/Merrem.  Initially had a lot of improvement in her sinus symptoms on IV antibiotics, but then (On day 10/11) had fever and productive cough.  She was transferred here for further management.    ENT is consulted for sinusitis.    Subjective: Gisel feels like her sinuses are doing better.  She thinks her lungs are more of "the issue" at the present time.  She has not noticed any nasal discharge, denies painful facial pressure, and is breathing through her nose at a baseline level.  She does endorse "irritating" itching of the right ear canal.      Medications:  Continuous Infusions:  Scheduled Meds:   ARIPiprazole  2 mg Oral QHS    calcium-vitamin D3  1 tablet Oral BID    dapsone  100 mg Oral Daily    docusate sodium  100 mg Oral BID    fluticasone  2 spray Each Nare Daily    folic acid  1 mg Oral Daily    gabapentin  1,100 mg Oral TID    lipase-protease-amylase " 24,000-76,000-120,000 units  6 capsule Oral TID WM    magnesium oxide  400 mg Oral BID    meropenem (MERREM) IVPB  1 g Intravenous Q8H    mirtazapine  45 mg Oral QHS    montelukast  10 mg Oral Daily    morphine  15 mg Oral TID    multivitamin  1 tablet Oral Daily    pantoprazole  40 mg Oral Daily    predniSONE  5 mg Oral Daily    tacrolimus  3.5 mg Oral BID    topiramate  25 mg Oral BID    valGANciclovir  900 mg Oral BID    vancomycin (VANCOCIN) IVPB  1,000 mg Intravenous Q12H     PRN Meds:butalbital-acetaminophen-caffeine -40 mg, dextrose 50%, dextrose 50%, diphenhydrAMINE, glucagon (human recombinant), glucose, glucose, insulin aspart U-100, levalbuterol, LORazepam, ondansetron, oxyCODONE, promethazine, tiZANidine     No current facility-administered medications on file prior to encounter.      Current Outpatient Prescriptions on File Prior to Encounter   Medication Sig    aluminum-magnesium hydroxide-simethicone (MAALOX ADVANCED) 200-200-20 mg/5 mL Susp Take 30 mLs by mouth before meals and at bedtime as needed.    amlodipine (NORVASC) 5 MG tablet Take 5 mg by mouth once daily.    aripiprazole (ABILIFY) 2 MG Tab Take 2 mg by mouth once daily.    butalbital-acetaminophen-caffeine -40 mg (FIORICET, ESGIC) -40 mg per tablet TAKE 1 TABLET BY MOUTH EVERY 6 HOURS AS NEEDED FOR HEADACHE    calcium-vitamin D3 500 mg(1,250mg) -200 unit per tablet Take 1 tablet by mouth 2 (two) times daily with meals.    CREON 24,000-76,000 -120,000 unit capsule TAKE 5 CAPSULES BY MOUTH WITH MEALS AND 4 CAPSULES WITH SNACKS EVERYDAY    dapsone 100 MG Tab TAKE 1 TABLET BY MOUTH EVERY DAY    diphenhydrAMINE (BENADRYL) 50 MG tablet Take 1 tablet (50 mg total) by mouth every 6 (six) hours as needed for Itching.    DULERA 100-5 mcg/actuation HFAA INHALE 1 PUFF BY MOUTH TWICE DAILY (Patient taking differently: INHALE 2 PUFF BY MOUTH TWICE DAILY)    DULERA 100-5 mcg/actuation HFAA INHALE 1 PUFF BY MOUTH  TWICE DAILY    fexofenadine (ALLEGRA) 180 MG tablet Take 180 mg by mouth once daily.    fluconazole (DIFLUCAN) 150 MG Tab TAKE 1 TABLET BY MOUTH EVERY WEEK (Patient taking differently: TAKE 1 TABLET BY MOUTH EVERY WEEK PRN VAGINAL YEAST S/S)    fluticasone (FLONASE) 50 mcg/actuation nasal spray INSERT 2 SPRAYS IN EACH NOSTRIL DAILY    folic acid (FOLVITE) 1 MG tablet Take 1 tablet (1,000 mcg total) by mouth once daily.    gabapentin (NEURONTIN) 300 MG capsule Take 1 capsule (300 mg total) by mouth 3 (three) times daily. (Patient taking differently: Take 1,100 mg by mouth 3 (three) times daily. )    insulin glargine, TOUJEO, (TOUJEO) 300 unit/mL (1.5 mL) InPn pen Inject 3 Units into the skin once daily.     levalbuterol (XOPENEX HFA) 45 mcg/actuation inhaler INHALE 1 TO 2 PUFFS INTO THE LUNGS EVERY 6 HOURS AS NEEDED FOR WHEEZING OR SHORTNESS OF BREATH. USE WITH SPACER.    lorazepam (ATIVAN) 2 MG Tab Take 1 tablet (2 mg total) by mouth every 12 (twelve) hours as needed (for anxiety).    magnesium oxide (MAG-OX) 400 mg tablet Take 1 tablet (400 mg total) by mouth 2 (two) times daily.    mirtazapine (REMERON) 30 MG tablet Take 30 mg by mouth every evening.    montelukast (SINGULAIR) 10 mg tablet TAKE 1 TABLET BY MOUTH EVERY DAY    morphine (MS CONTIN) 15 MG 12 hr tablet Take 15 mg by mouth 3 (three) times daily.    mv. min cmb#[id number] (AQUADEKS) 100-700-10 mcg-mcg-mg Cap cap Take 1 capsule by mouth 2 (two) times daily.    mycophenolate (CELLCEPT) 250 mg Cap Take 2 capsules (500 mg total) by mouth 2 (two) times daily.    omeprazole (PRILOSEC) 40 MG capsule TAKE 1 CAPSULE BY MOUTH EVERY MORNING    ondansetron (ZOFRAN) 8 MG tablet Take 1 tablet (8 mg total) by mouth every 8 (eight) hours as needed.    ondansetron (ZOFRAN) 8 MG tablet TAKE 1 TABLET(8 MG) BY MOUTH EVERY 8 HOURS AS NEEDED    oxycodone (ROXICODONE) 5 MG immediate release tablet Take 10 mg by mouth every 4 (four) hours as needed for Pain.     polyethylene glycol (GLYCOLAX) 17 gram PwPk Take 17 g by mouth 2 (two) times daily.    predniSONE (DELTASONE) 5 MG tablet Take 5 mg by mouth once daily.    PROCHAMBER USE AS DIRECTED    promethazine (PHENERGAN) 25 MG tablet TAKE 1 TABLET BY MOUTH EVERY 8 HOURS AS NEEDED FOR NAUSEA    tacrolimus (PROGRAF) 0.5 MG Cap Take 1 capsule (0.5 mg total) by mouth every 12 (twelve) hours.    tacrolimus (PROGRAF) 1 MG Cap Take 3 capsules (3 mg total) by mouth every 12 (twelve) hours. Daily doses: 3.5 mg every 12 hours.    tiZANidine (ZANAFLEX) 4 MG tablet TAKE 2 TABLETS BY MOUTH EVERY 6 HOURS AS NEEDED       Review of patient's allergies indicates:   Allergen Reactions    Albuterol Palpitations    Colistin Anaphylaxis    Vancomycin analogues      Infusion reaction that does not resolve with slowing    Neupogen [filgrastim] Other (See Comments)     Ostealgia after five daily doses of 300 mcg.      Bactrim [sulfamethoxazole-trimethoprim] Hives    Ceftazidime Hives     Pt stated can tolerate cefapine not ceftazidime    Ceftazidime     Dronabinol Other (See Comments)     Mental changes/hallucinations    Haldol [haloperidol lactate] Other (See Comments)     Seizure like activity    Nsaids (non-steroidal anti-inflammatory drug)      Cannot have due to lung transplant    Adhesive Rash     Cloth tape- please use tegaderm or paper tape    Aztreonam Rash    Ciprofloxacin Nausea And Vomiting     Projectile N/V, per patient.  Unwilling to retry therapy.       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     Past Surgical History:    Procedure Laterality Date    ABDOMINAL SURGERY      peg tube     CHOLECYSTECTOMY  2016    ENDOMETRIAL ABLATION  2015    [facility]    FESS      LARYNX SURGERY  2016    LUNG TRANSPLANT Bilateral 6/12/14    MYRINGOTOMY W/ TUBES Right 04/2017    PORTACATH PLACEMENT Right     rt sc    SALPINGECTOMY Bilateral 2015    [facility]     Family History     Problem Relation (Age of Onset)    Drug abuse Maternal Grandfather    Thrombocytopenia Maternal Grandfather        Social History Main Topics    Smoking status: Never Smoker    Smokeless tobacco: Never Used    Alcohol use No      Comment: Has not had an alcoholic drink in more than 2 months.    Drug use: No    Sexual activity: Yes     Partners: Male     Birth control/ protection: Implant     Review of Systems  Objective:     Vital Signs (Most Recent):  Temp: 98.7 °F (37.1 °C) (03/19/18 0730)  Pulse: 88 (03/19/18 1140)  Resp: 18 (03/19/18 1140)  BP: 105/63 (03/19/18 1140)  SpO2: (!) 92 % (03/19/18 1140) Vital Signs (24h Range):  Temp:  [98.4 °F (36.9 °C)-98.9 °F (37.2 °C)] 98.7 °F (37.1 °C)  Pulse:  [] 88  Resp:  [16-20] 18  SpO2:  [91 %-93 %] 92 %  BP: ()/(56-70) 105/63     Weight: 60 kg (132 lb 4.4 oz)  Body mass index is 24.99 kg/m².      Date 03/19/18 0700 - 03/20/18 0659   Shift 8143-8930 9901-7902 9827-8693 24 Hour Total   I  N  T  A  K  E   P.O. 120   120    IV Piggyback 250   250    Shift Total  (mL/kg) 370  (6.2)   370  (6.2)   O  U  T  P  U  T   Shift Total  (mL/kg)       Weight (kg) 60 60 60 60       Physical Exam  Alert, pleasant  EOMI  External nose normal without lesions  Rigid nasal endoscopy:   Edematous sinus mucosa with crusting, bilateral patent ethmoid cavity and maxillary antrostomies.  No evidence of active infection, no bleeding or ulceration.  Oral cavity with moist mucous membranes, no lesions  Neck soft and supple, non-tender  Voice normal    Significant Labs:  BMP:   Recent Labs  Lab 03/19/18  0500   GLU 69*      CO2 20*   BUN 13    CREATININE 0.9   CALCIUM 8.1*   MG 1.7     CBC:   Recent Labs  Lab 03/19/18  0500   WBC 6.71   RBC 3.07*   HGB 8.9*   HCT 29.6*   *   MCV 96   MCH 29.0   MCHC 30.1*     Microbiology Results (last 7 days)     Procedure Component Value Units Date/Time    Culture, Respiratory with Gram Stain [[id number]]     Order Status:  No result Specimen:  Respiratory from Sputum, Expectorated           Significant Diagnostics:  CT: I have reviewed all pertinent results/findings within the past 24 hours and my personal findings are:  mucosal thickening with patent antrostomies, no bony invasion    Assessment/Plan:     Sinus infection    Patient seen and examined with Dr. Olsen  Do not believe there is an active sinus infection currently  Agree with plan for bronchoscopy tomorrow  Continue antibiotics per primary team pending cultures  Would like to have a repeat nasal culture, ENT will obtain            VTE Risk Mitigation         Ordered     IP VTE LOW RISK PATIENT  Once      03/18/18 3969          Thank you for your consult. I will follow-up with patient. Please contact us if you have any additional questions.    Khanh Arellano MD  Otorhinolaryngology-Head & Neck Surgery  [facility]

## 2018-03-19 NOTE — ASSESSMENT & PLAN NOTE
On Tacrolimus 3.5mg BID, MMF 500mg BID, and Prednisone 5mg as an outpatient.      Will continue Tacrolimus at 3.5mg BID and Prednisone 5mg for now.  Continue to hold MMF in the setting of acute infection.  Will follow daily Tacrolimus levels and adjust as needed, today's level 11.3.

## 2018-03-19 NOTE — SUBJECTIVE & OBJECTIVE
"Subjective: Gisel feels like her sinuses are doing better.  She thinks her lungs are more of "the issue" at the present time.  She has not noticed any nasal discharge, denies painful facial pressure, and is breathing through her nose at a baseline level.  She does endorse "irritating" itching of the right ear canal.      Medications:  Continuous Infusions:  Scheduled Meds:   ARIPiprazole  2 mg Oral QHS    calcium-vitamin D3  1 tablet Oral BID    dapsone  100 mg Oral Daily    docusate sodium  100 mg Oral BID    fluticasone  2 spray Each Nare Daily    folic acid  1 mg Oral Daily    gabapentin  1,100 mg Oral TID    lipase-protease-amylase 24,000-76,000-120,000 units  6 capsule Oral TID WM    magnesium oxide  400 mg Oral BID    meropenem (MERREM) IVPB  1 g Intravenous Q8H    mirtazapine  45 mg Oral QHS    montelukast  10 mg Oral Daily    morphine  15 mg Oral TID    multivitamin  1 tablet Oral Daily    pantoprazole  40 mg Oral Daily    predniSONE  5 mg Oral Daily    tacrolimus  3.5 mg Oral BID    topiramate  25 mg Oral BID    valGANciclovir  900 mg Oral BID    vancomycin (VANCOCIN) IVPB  1,000 mg Intravenous Q12H     PRN Meds:butalbital-acetaminophen-caffeine -40 mg, dextrose 50%, dextrose 50%, diphenhydrAMINE, glucagon (human recombinant), glucose, glucose, insulin aspart U-100, levalbuterol, LORazepam, ondansetron, oxyCODONE, promethazine, tiZANidine     No current facility-administered medications on file prior to encounter.      Current Outpatient Prescriptions on File Prior to Encounter   Medication Sig    aluminum-magnesium hydroxide-simethicone (MAALOX ADVANCED) 200-200-20 mg/5 mL Susp Take 30 mLs by mouth before meals and at bedtime as needed.    amlodipine (NORVASC) 5 MG tablet Take 5 mg by mouth once daily.    aripiprazole (ABILIFY) 2 MG Tab Take 2 mg by mouth once daily.    butalbital-acetaminophen-caffeine -40 mg (FIORICET, ESGIC) -40 mg per tablet TAKE 1 TABLET BY " MOUTH EVERY 6 HOURS AS NEEDED FOR HEADACHE    calcium-vitamin D3 500 mg(1,250mg) -200 unit per tablet Take 1 tablet by mouth 2 (two) times daily with meals.    CREON 24,000-76,000 -120,000 unit capsule TAKE 5 CAPSULES BY MOUTH WITH MEALS AND 4 CAPSULES WITH SNACKS EVERYDAY    dapsone 100 MG Tab TAKE 1 TABLET BY MOUTH EVERY DAY    diphenhydrAMINE (BENADRYL) 50 MG tablet Take 1 tablet (50 mg total) by mouth every 6 (six) hours as needed for Itching.    DULERA 100-5 mcg/actuation HFAA INHALE 1 PUFF BY MOUTH TWICE DAILY (Patient taking differently: INHALE 2 PUFF BY MOUTH TWICE DAILY)    DULERA 100-5 mcg/actuation HFAA INHALE 1 PUFF BY MOUTH TWICE DAILY    fexofenadine (ALLEGRA) 180 MG tablet Take 180 mg by mouth once daily.    fluconazole (DIFLUCAN) 150 MG Tab TAKE 1 TABLET BY MOUTH EVERY WEEK (Patient taking differently: TAKE 1 TABLET BY MOUTH EVERY WEEK PRN VAGINAL YEAST S/S)    fluticasone (FLONASE) 50 mcg/actuation nasal spray INSERT 2 SPRAYS IN EACH NOSTRIL DAILY    folic acid (FOLVITE) 1 MG tablet Take 1 tablet (1,000 mcg total) by mouth once daily.    gabapentin (NEURONTIN) 300 MG capsule Take 1 capsule (300 mg total) by mouth 3 (three) times daily. (Patient taking differently: Take 1,100 mg by mouth 3 (three) times daily. )    insulin glargine, TOUJEO, (TOUJEO) 300 unit/mL (1.5 mL) InPn pen Inject 3 Units into the skin once daily.     levalbuterol (XOPENEX HFA) 45 mcg/actuation inhaler INHALE 1 TO 2 PUFFS INTO THE LUNGS EVERY 6 HOURS AS NEEDED FOR WHEEZING OR SHORTNESS OF BREATH. USE WITH SPACER.    lorazepam (ATIVAN) 2 MG Tab Take 1 tablet (2 mg total) by mouth every 12 (twelve) hours as needed (for anxiety).    magnesium oxide (MAG-OX) 400 mg tablet Take 1 tablet (400 mg total) by mouth 2 (two) times daily.    mirtazapine (REMERON) 30 MG tablet Take 30 mg by mouth every evening.    montelukast (SINGULAIR) 10 mg tablet TAKE 1 TABLET BY MOUTH EVERY DAY    morphine (MS CONTIN) 15 MG 12 hr tablet  Take 15 mg by mouth 3 (three) times daily.    mv. min cmb#52-[id number] (AQUADEKS) 100-700-10 mcg-mcg-mg Cap cap Take 1 capsule by mouth 2 (two) times daily.    mycophenolate (CELLCEPT) 250 mg Cap Take 2 capsules (500 mg total) by mouth 2 (two) times daily.    omeprazole (PRILOSEC) 40 MG capsule TAKE 1 CAPSULE BY MOUTH EVERY MORNING    ondansetron (ZOFRAN) 8 MG tablet Take 1 tablet (8 mg total) by mouth every 8 (eight) hours as needed.    ondansetron (ZOFRAN) 8 MG tablet TAKE 1 TABLET(8 MG) BY MOUTH EVERY 8 HOURS AS NEEDED    oxycodone (ROXICODONE) 5 MG immediate release tablet Take 10 mg by mouth every 4 (four) hours as needed for Pain.    polyethylene glycol (GLYCOLAX) 17 gram PwPk Take 17 g by mouth 2 (two) times daily.    predniSONE (DELTASONE) 5 MG tablet Take 5 mg by mouth once daily.    PROCHAMBER USE AS DIRECTED    promethazine (PHENERGAN) 25 MG tablet TAKE 1 TABLET BY MOUTH EVERY 8 HOURS AS NEEDED FOR NAUSEA    tacrolimus (PROGRAF) 0.5 MG Cap Take 1 capsule (0.5 mg total) by mouth every 12 (twelve) hours.    tacrolimus (PROGRAF) 1 MG Cap Take 3 capsules (3 mg total) by mouth every 12 (twelve) hours. Daily doses: 3.5 mg every 12 hours.    tiZANidine (ZANAFLEX) 4 MG tablet TAKE 2 TABLETS BY MOUTH EVERY 6 HOURS AS NEEDED       Review of patient's allergies indicates:   Allergen Reactions    Albuterol Palpitations    Colistin Anaphylaxis    Vancomycin analogues      Infusion reaction that does not resolve with slowing    Neupogen [filgrastim] Other (See Comments)     Ostealgia after five daily doses of 300 mcg.      Bactrim [sulfamethoxazole-trimethoprim] Hives    Ceftazidime Hives     Pt stated can tolerate cefapine not ceftazidime    Ceftazidime     Dronabinol Other (See Comments)     Mental changes/hallucinations    Haldol [haloperidol lactate] Other (See Comments)     Seizure like activity    Nsaids (non-steroidal anti-inflammatory drug)      Cannot have due to lung transplant     Adhesive Rash     Cloth tape- please use tegaderm or paper tape    Aztreonam Rash    Ciprofloxacin Nausea And Vomiting     Projectile N/V, per patient.  Unwilling to retry therapy.       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     Past Surgical History:   Procedure Laterality Date    ABDOMINAL SURGERY      peg tube     CHOLECYSTECTOMY  2016    ENDOMETRIAL ABLATION  2015    [facility]    FESS      LARYNX SURGERY  2016    LUNG TRANSPLANT Bilateral 6/12/14    MYRINGOTOMY W/ TUBES Right 04/2017    PORTACATH PLACEMENT Right     rt sc    SALPINGECTOMY Bilateral 2015    [facility]     Family History     Problem Relation (Age of Onset)    Drug abuse Maternal Grandfather    Thrombocytopenia Maternal Grandfather        Social History Main Topics    Smoking status: Never Smoker    Smokeless tobacco: Never Used    Alcohol use No      Comment: Has not had an alcoholic drink in more than 2 months.    Drug use: No    Sexual activity: Yes     Partners: Male     Birth control/ protection: Implant     Review of Systems  Objective:     Vital Signs (Most Recent):  Temp: 98.7 °F (37.1 °C) (03/19/18 0730)  Pulse: 88 (03/19/18 1140)  Resp: 18 (03/19/18 1140)  BP: 105/63 (03/19/18 1140)  SpO2: (!) 92 % (03/19/18 1140) Vital Signs (24h Range):  Temp:  [98.4 °F (36.9 °C)-98.9 °F (37.2 °C)] 98.7 °F (37.1 °C)  Pulse:  [] 88  Resp:  [16-20] 18  SpO2:  [91 %-93 %] 92 %  BP: ()/(56-70) 105/63     Weight: 60 kg (132 lb 4.4 oz)  Body mass index is 24.99 kg/m².      Date 03/19/18 0700 - 03/20/18 0659   Shift 6010-8137 1353-4322 1591-0307 24  Hour Total   I  N  T  A  K  E   P.O. 120   120    IV Piggyback 250   250    Shift Total  (mL/kg) 370  (6.2)   370  (6.2)   O  U  T  P  U  T   Shift Total  (mL/kg)       Weight (kg) 60 60 60 60       Physical Exam  Alert, pleasant  EOMI  External nose normal without lesions  Rigid nasal endoscopy:   Edematous sinus mucosa with crusting, bilateral patent ethmoid cavity and maxillary antrostomies.  No evidence of active infection, no bleeding or ulceration.  Oral cavity with moist mucous membranes, no lesions  Neck soft and supple, non-tender  Voice normal    Significant Labs:  BMP:   Recent Labs  Lab 03/19/18  0500   GLU 69*      CO2 20*   BUN 13   CREATININE 0.9   CALCIUM 8.1*   MG 1.7     CBC:   Recent Labs  Lab 03/19/18  0500   WBC 6.71   RBC 3.07*   HGB 8.9*   HCT 29.6*   *   MCV 96   MCH 29.0   MCHC 30.1*     Microbiology Results (last 7 days)     Procedure Component Value Units Date/Time    Culture, Respiratory with Gram Stain [[id number]]     Order Status:  No result Specimen:  Respiratory from Sputum, Expectorated           Significant Diagnostics:  CT: I have reviewed all pertinent results/findings within the past 24 hours and my personal findings are:  mucosal thickening with patent antrostomies, no bony invasion

## 2018-03-19 NOTE — ASSESSMENT & PLAN NOTE
On Norvasc and Lisinopril at home.      Continues to be hypotensive today, continue to hold home meds. Will likely resume tomorrow.

## 2018-03-19 NOTE — ASSESSMENT & PLAN NOTE
Received BLT on 6/12/2014 for cystic fibrosis.  Has known CARLOS EDUARDO grade 3.    Continue immunosuppressives and prophylactic antibiotics. OR bronchoscopy scheduled for tomorrow morning, NPO at midnight.

## 2018-03-19 NOTE — ASSESSMENT & PLAN NOTE
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 She is stable currently on empiric vanc/beatrice/tobra.    - may continue current coverage with vancomycin, meropenem, and tobramycin   - agree with plans for bronchoscopy tomorrow  - sinus CT reviewed but per patient report no evidence of ongoing active purulent sinus disease or concerns for fungal disease per ENT scope today; await their note to confirm these findings  - will send fungal markers and noninvasive markers of pulmonary infection

## 2018-03-19 NOTE — ASSESSMENT & PLAN NOTE
On Creon with meals.    Continue with pancreatic enzymes with meals.  BG monitoring and low dose SSI ordered.  Endocrine consulted.

## 2018-03-19 NOTE — PROGRESS NOTES
[facility]  Lung Transplant  Progress Note - Floor    Patient Name: Juanita Ibarra  MRN: [id number]  Admission Date: 3/18/2018  Hospital Length of Stay: 1 days  Post-Operative Day: 1376  Attending Physician: Jake Alvarez MD  Primary Care Provider: Jake Alvarez MD     Subjective:     Interval History: No acute events overnight. Patient placed on Meropenem/Vanc since admission to OSH on 3/6, received tobramycin x 1 3/17 with some improvement of congestion and post-nasal drip. Continues to complain of left sided chest pain, unchanged from prior to admission, requesting more frequent dosing of narcotics. Currently 93% on room air. No other complaints at this time. OR bronchoscopy scheduled for tomorrow morning. CMV PCR pending.     Continuous Infusions:  Scheduled Meds:   ARIPiprazole  2 mg Oral QHS    calcium-vitamin D3  1 tablet Oral BID    dapsone  100 mg Oral Daily    docusate sodium  100 mg Oral BID    fluticasone  2 spray Each Nare Daily    folic acid  1 mg Oral Daily    gabapentin  1,100 mg Oral TID    lipase-protease-amylase 24,000-76,000-120,000 units  6 capsule Oral TID WM    magnesium oxide  400 mg Oral BID    meropenem (MERREM) IVPB  1 g Intravenous Q8H    mirtazapine  45 mg Oral QHS    montelukast  10 mg Oral Daily    morphine  15 mg Oral TID    multivitamin  1 tablet Oral Daily    pantoprazole  40 mg Oral Daily    predniSONE  5 mg Oral Daily    tacrolimus  3.5 mg Oral BID    topiramate  25 mg Oral BID    valGANciclovir  900 mg Oral BID    vancomycin (VANCOCIN) IVPB  1,000 mg Intravenous Q12H     PRN Meds:butalbital-acetaminophen-caffeine -40 mg, dextrose 50%, dextrose 50%, diphenhydrAMINE, glucagon (human recombinant), glucose, glucose, insulin aspart U-100, levalbuterol, LORazepam, ondansetron, oxyCODONE, promethazine, tiZANidine    Review of patient's allergies indicates:   Allergen Reactions    Albuterol Palpitations    Colistin  Anaphylaxis    Vancomycin analogues      Infusion reaction that does not resolve with slowing    Neupogen [filgrastim] Other (See Comments)     Ostealgia after five daily doses of 300 mcg.      Bactrim [sulfamethoxazole-trimethoprim] Hives    Ceftazidime Hives     Pt stated can tolerate cefapine not ceftazidime    Ceftazidime     Dronabinol Other (See Comments)     Mental changes/hallucinations    Haldol [haloperidol lactate] Other (See Comments)     Seizure like activity    Nsaids (non-steroidal anti-inflammatory drug)      Cannot have due to lung transplant    Adhesive Rash     Cloth tape- please use tegaderm or paper tape    Aztreonam Rash    Ciprofloxacin Nausea And Vomiting     Projectile N/V, per patient.  Unwilling to retry therapy.       Review of Systems   Constitutional: Positive for chills, fatigue and fever. Negative for activity change, appetite change and diaphoresis.   HENT: Positive for congestion and postnasal drip. Negative for dental problem, drooling, ear discharge, ear pain, facial swelling, hearing loss, mouth sores, nosebleeds, rhinorrhea, sinus pain, sinus pressure, sneezing, sore throat and voice change.    Eyes: Negative for photophobia, pain, redness, itching and visual disturbance.   Respiratory: Positive for shortness of breath. Negative for cough, choking, chest tightness, wheezing and stridor.    Cardiovascular: Negative for chest pain, palpitations and leg swelling.   Gastrointestinal: Negative for abdominal distention, abdominal pain, constipation, diarrhea, nausea and vomiting.   Endocrine: Negative for cold intolerance, heat intolerance, polydipsia, polyphagia and polyuria.   Genitourinary: Negative for difficulty urinating, dysuria, flank pain, frequency and urgency.   Musculoskeletal: Negative for arthralgias, back pain, gait problem, myalgias, neck pain and neck stiffness.   Skin: Negative for color change and rash.   Allergic/Immunologic: Positive for  immunocompromised state.   Neurological: Negative for dizziness, tremors, seizures, syncope, weakness, light-headedness, numbness and headaches.   Hematological: Negative for adenopathy.   Psychiatric/Behavioral: Negative for agitation and confusion.     Objective:   Physical Exam   Constitutional: She is oriented to person, place, and time. She appears well-developed and well-nourished. No distress.   HENT:   Head: Normocephalic and atraumatic.   Right Ear: External ear normal.   Left Ear: External ear normal.   Nose: Nose normal.   Mouth/Throat: Oropharynx is clear and moist. No oropharyngeal exudate.   Eyes: Conjunctivae and EOM are normal. Pupils are equal, round, and reactive to light. Right eye exhibits no discharge. Left eye exhibits no discharge. No scleral icterus.   Neck: Normal range of motion. Neck supple. No JVD present. No tracheal deviation present. No thyromegaly present.   Cardiovascular: Normal rate, regular rhythm, normal heart sounds and intact distal pulses.  Exam reveals no gallop and no friction rub.    No murmur heard.  Pulmonary/Chest: Effort normal. No accessory muscle usage. No tachypnea. No respiratory distress. She has no wheezes. She has no rhonchi. She has rales in the right lower field. She exhibits no tenderness.   Abdominal: Soft. Bowel sounds are normal. She exhibits no distension. There is no tenderness. There is no rebound and no guarding.   Musculoskeletal: Normal range of motion. She exhibits no edema, tenderness or deformity.   Lymphadenopathy:     She has no cervical adenopathy.   Neurological: She is alert and oriented to person, place, and time. No cranial nerve deficit.   Skin: Skin is warm and dry. No rash noted. She is not diaphoretic. No erythema. No pallor.   Psychiatric: She has a normal mood and affect. Judgment normal.   Nursing note and vitals reviewed.        Vital Signs (Most Recent):  Temp: 98.7 °F (37.1 °C) (03/19/18 0730)  Pulse: 85 (03/19/18 0730)  Resp: 16  (03/19/18 0730)  BP: 100/60 (03/19/18 0730)  SpO2: (!) 93 % (03/19/18 0730) Vital Signs (24h Range):  Temp:  [98.4 °F (36.9 °C)-98.9 °F (37.2 °C)] 98.7 °F (37.1 °C)  Pulse:  [] 85  Resp:  [16-20] 16  SpO2:  [91 %-93 %] 93 %  BP: ()/(56-70) 100/60     Weight: 60 kg (132 lb 4.4 oz)  Body mass index is 24.99 kg/m².      Intake/Output Summary (Last 24 hours) at 03/19/18 1108  Last data filed at 03/19/18 0849   Gross per 24 hour   Intake              840 ml   Output             1100 ml   Net             -260 ml       Significant Labs:  CBC:    Recent Labs  Lab 03/19/18  0500   WBC 6.71   RBC 3.07*   HGB 8.9*   HCT 29.6*   *   MCV 96   MCH 29.0   MCHC 30.1*     BMP:    Recent Labs  Lab 03/19/18  0500      K 4.1      CO2 20*   BUN 13   CREATININE 0.9   CALCIUM 8.1*      Tacrolimus Levels:    Recent Labs  Lab 03/19/18  0500   TACROLIMUS 11.3     Microbiology:  Microbiology Results (last 7 days)     Procedure Component Value Units Date/Time    Culture, Respiratory with Gram Stain [[id number]]     Order Status:  No result Specimen:  Respiratory from Sputum, Expectorated           I have reviewed all pertinent labs within the past 24 hours.    Diagnostic Results:  Labs: Reviewed  X-Ray: Reviewed      Assessment/Plan:     * Hospital acquired PNA    Pa/lateral CXR from admission with RLL infiltrate, +crackles RLL on exam, Procalcitonin 0.16.   WBC - 6.71 today, remains afebrile, VSS, was placed on Merropenem/Vanc whilein [place] on 3/6 admission, received tobramycin x 1 yesterday.   Continue Merropenem/Vanc, ID following, appreciate recs.   Continue daily labs. OR Bronchoscopy scheduled for tomorrow morning.         Lung transplant status, bilateral    Received BLT on 6/12/2014 for cystic fibrosis.  Has known CARLOS EDUARDO grade 3.    Continue immunosuppressives and prophylactic antibiotics. OR bronchoscopy scheduled for tomorrow morning, NPO at midnight.         Prophylactic antibiotic    On Dapsone 100mg  daily as an outpatient.  CMV D+/R- with hx of viremia.     Continue Dapsone. CMV PCR results pending, patient placed on valganciclovir 900 mg BID.         Immunosuppression    On Tacrolimus 3.5mg BID, MMF 500mg BID, and Prednisone 5mg as an outpatient.      Will continue Tacrolimus at 3.5mg BID and Prednisone 5mg for now.  Continue to hold MMF in the setting of acute infection.  Will follow daily Tacrolimus levels and adjust as needed, today's level 11.3.        Bronchiolitis obliterans syndrome    Has known CARLOS EDUARDO grade 3 but remains with good functional status and does not require supplemental oxygen.    No further interventions at this time.        Sinus infection    Admitted in [place] on 3/6/2018 for acute sinusitis and was to complete 14 days of inpatient IV therapy.  Developed fevers up to 101.8F on 3/17/2018 and was transferred to [facility] for further management.      No culture data available from OSH.  Most recent cultures here have shown MRSA/Pseudomonas in 2017.  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    Will continue current IV therapy with Vanc/Merrem--pre medication with IV benadryl prior to Vanc.  Received tobramycin x 1 yesterday.   CT sinuses yesterday worsening paranasal sinus opacification.  Respiratory culture, viral pathogens panel, CMV PCR, aspergillus ag, and fungitell pending.  ID and ENT consulted, appreciate recs.             Hypertension    On Norvasc and Lisinopril at home.      Continues to be hypotensive today, continue to hold home meds. Will likely resume tomorrow.         CF related Pancreatic insufficiency    On Creon with meals.    Continue with pancreatic enzymes with meals.  BG monitoring and low dose SSI ordered.  Endocrine consulted, appreciate recs.         Anxiety disorder    Continue home medications which include Abilify and Ativan.  Remeron nightly for sleep.        Chronic pain with opiate use    Will continue home medications which include Gabapentin 1100mg  TID, MS Contin 15mg TID, Oxycodone 10mg prn q6 hours, Tizanidine 4mg.        Headache    Continue with Topamax.            Shama Canales PA-C  Lung Transplant  [facility]

## 2018-03-19 NOTE — ASSESSMENT & PLAN NOTE
Has known CARLOS EDUARDO but remains with good functional status and does not require supplemental oxygen.    No further interventions at this time.

## 2018-03-19 NOTE — CONSULTS
[facility] [address]  Infectious Disease  Consult Note    Patient Name: Juanita Ibarra  MRN: [id number]  Admission Date: 3/18/2018  Hospital Length of Stay: 1 days  Attending Physician: Jake Alvarez MD  Primary Care Provider: Jake Alvarez MD     Isolation Status: No active isolations    Patient information was obtained from patient and past medical records.      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 She is stable currently on empiric vanc/beatrice/tobra.    - may continue current coverage with vancomycin, meropenem, and tobramycin   - agree with plans for bronchoscopy tomorrow  - sinus CT reviewed but per patient report no evidence of ongoing active purulent sinus disease or concerns for fungal disease per ENT scope today; await their note to confirm these findings  - will send fungal markers and noninvasive markers of pulmonary infection  - agree with induction valcyte while awaiting CMV quant/bronch results          Thank you for your consult. I will follow-up with patient. Please contact us if you have any additional questions.     Juanita Francis MD  Transplant ID Attending  [id number]    Juanita Francis MD  Infectious Disease  [facility]    Subjective:     Principal Problem: Hospital acquired PNA    HPI: Ms. 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 She was first seen by her PCP with sinus congestion she attributed to seasonal allergies at the beginning of 3/2018 but was then admitted to an OSH in [place] on 3/6/2018 when she developed purulent sinus congestion, associated CARROLL, and productive cough. She was treated with an empiric vanc/meropenem course for pansinusitis with clinical improvement until HD #11 when she developed F/C/S (Tm 101.8F), worsening non-productive cough, and SOB with an increasing oxygen requirement. Tobramycin was added to her current regimen prior to transfer here to [facility] with improvement in her fever curve but not other symptoms to date. She feels that her symptoms somewhat resemble prior episodes of CMV reactivations she has had. She has been afebrile in house without a leukocytosis. Procalcitonin is not elevated and CT sinuses reveals pansinusitis with some concern of fungal colonization in some areas radiographically. No sinus cultures were obtained at the OSH facility and ENT evaluation here with endoscopy was unremarkable per patient report. Bronchoscopy is planned for tomorrow.    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       Past Surgical History:   Procedure Laterality Date    ABDOMINAL SURGERY      peg tube     CHOLECYSTECTOMY  2016    ENDOMETRIAL ABLATION  2015    KJB    FESS      LARYNX SURGERY  2016    LUNG TRANSPLANT Bilateral 6/12/14    MYRINGOTOMY W/ TUBES Right 04/2017    PORTACATH PLACEMENT Right     rt sc    SALPINGECTOMY Bilateral 2015    KJB       Review of patient's allergies indicates:   Allergen Reactions    Albuterol Palpitations    Colistin Anaphylaxis    Vancomycin analogues      Infusion reaction that does not resolve with slowing    Neupogen [filgrastim] Other (See Comments)     Ostealgia after five daily doses of 300 mcg.      Bactrim [sulfamethoxazole-trimethoprim] Hives    Ceftazidime Hives     Pt stated can tolerate cefapine not ceftazidime    Ceftazidime     Dronabinol Other (See Comments)     Mental changes/hallucinations    Haldol [haloperidol lactate] Other (See Comments)     Seizure like activity    Nsaids (non-steroidal anti-inflammatory drug)      Cannot have due to lung transplant    Adhesive Rash     Cloth tape- please use tegaderm or paper tape    Aztreonam Rash    Ciprofloxacin Nausea And Vomiting     Projectile N/V, per patient.  Unwilling to retry therapy.       Medications:  Prescriptions Prior to Admission   Medication Sig    aluminum-magnesium hydroxide-simethicone (MAALOX ADVANCED) 200-200-20 mg/5 mL Susp Take 30 mLs by mouth before meals and at bedtime as needed.    amlodipine (NORVASC) 5 MG tablet Take 5 mg by mouth once daily.    aripiprazole (ABILIFY) 2 MG Tab Take 2 mg by mouth once daily.    butalbital-acetaminophen-caffeine -40 mg (FIORICET, ESGIC) -40 mg per tablet TAKE 1 TABLET BY MOUTH EVERY 6 HOURS AS NEEDED FOR HEADACHE     calcium-vitamin D3 500 mg(1,250mg) -200 unit per tablet Take 1 tablet by mouth 2 (two) times daily with meals.    CREON 24,000-76,000 -120,000 unit capsule TAKE 5 CAPSULES BY MOUTH WITH MEALS AND 4 CAPSULES WITH SNACKS EVERYDAY    dapsone 100 MG Tab TAKE 1 TABLET BY MOUTH EVERY DAY    diphenhydrAMINE (BENADRYL) 50 MG tablet Take 1 tablet (50 mg total) by mouth every 6 (six) hours as needed for Itching.    DULERA 100-5 mcg/actuation HFAA INHALE 1 PUFF BY MOUTH TWICE DAILY (Patient taking differently: INHALE 2 PUFF BY MOUTH TWICE DAILY)    DULERA 100-5 mcg/actuation HFAA INHALE 1 PUFF BY MOUTH TWICE DAILY    fexofenadine (ALLEGRA) 180 MG tablet Take 180 mg by mouth once daily.    fluconazole (DIFLUCAN) 150 MG Tab TAKE 1 TABLET BY MOUTH EVERY WEEK (Patient taking differently: TAKE 1 TABLET BY MOUTH EVERY WEEK PRN VAGINAL YEAST S/S)    fluticasone (FLONASE) 50 mcg/actuation nasal spray INSERT 2 SPRAYS IN EACH NOSTRIL DAILY    folic acid (FOLVITE) 1 MG tablet Take 1 tablet (1,000 mcg total) by mouth once daily.    gabapentin (NEURONTIN) 300 MG capsule Take 1 capsule (300 mg total) by mouth 3 (three) times daily. (Patient taking differently: Take 1,100 mg by mouth 3 (three) times daily. )    insulin glargine, TOUJEO, (TOUJEO) 300 unit/mL (1.5 mL) InPn pen Inject 3 Units into the skin once daily.     levalbuterol (XOPENEX HFA) 45 mcg/actuation inhaler INHALE 1 TO 2 PUFFS INTO THE LUNGS EVERY 6 HOURS AS NEEDED FOR WHEEZING OR SHORTNESS OF BREATH. USE WITH SPACER.    lorazepam (ATIVAN) 2 MG Tab Take 1 tablet (2 mg total) by mouth every 12 (twelve) hours as needed (for anxiety).    magnesium oxide (MAG-OX) 400 mg tablet Take 1 tablet (400 mg total) by mouth 2 (two) times daily.    mirtazapine (REMERON) 30 MG tablet Take 30 mg by mouth every evening.    montelukast (SINGULAIR) 10 mg tablet TAKE 1 TABLET BY MOUTH EVERY DAY    morphine (MS CONTIN) 15 MG 12 hr tablet Take 15 mg by mouth 3 (three) times daily.     mv. min cmb#[id number] (AQUADEKS) 100-700-10 mcg-mcg-mg Cap cap Take 1 capsule by mouth 2 (two) times daily.    mycophenolate (CELLCEPT) 250 mg Cap Take 2 capsules (500 mg total) by mouth 2 (two) times daily.    omeprazole (PRILOSEC) 40 MG capsule TAKE 1 CAPSULE BY MOUTH EVERY MORNING    ondansetron (ZOFRAN) 8 MG tablet Take 1 tablet (8 mg total) by mouth every 8 (eight) hours as needed.    ondansetron (ZOFRAN) 8 MG tablet TAKE 1 TABLET(8 MG) BY MOUTH EVERY 8 HOURS AS NEEDED    oxycodone (ROXICODONE) 5 MG immediate release tablet Take 10 mg by mouth every 4 (four) hours as needed for Pain.    polyethylene glycol (GLYCOLAX) 17 gram PwPk Take 17 g by mouth 2 (two) times daily.    predniSONE (DELTASONE) 5 MG tablet Take 5 mg by mouth once daily.    PROCHAMBER USE AS DIRECTED    promethazine (PHENERGAN) 25 MG tablet TAKE 1 TABLET BY MOUTH EVERY 8 HOURS AS NEEDED FOR NAUSEA    tacrolimus (PROGRAF) 0.5 MG Cap Take 1 capsule (0.5 mg total) by mouth every 12 (twelve) hours.    tacrolimus (PROGRAF) 1 MG Cap Take 3 capsules (3 mg total) by mouth every 12 (twelve) hours. Daily doses: 3.5 mg every 12 hours.    tiZANidine (ZANAFLEX) 4 MG tablet TAKE 2 TABLETS BY MOUTH EVERY 6 HOURS AS NEEDED     Antibiotics     Start     Stop Route Frequency Ordered    03/19/18 0800  dapsone tablet 100 mg      -- Oral Daily 03/18/18 1834    03/18/18 2000  vancomycin 1 gram/250 mL in sodium chloride 0.9% IVPB 1 g  (Vancomycin IVPB with levels panel)      -- IV Every 12 hours (non-standard times) 03/18/18 1905    03/18/18 2000  meropenem injection 1 g      -- IV Every 8 hours (non-standard times) 03/18/18 1905        Antifungals     None        Antivirals         Stop Route Frequency     valGANciclovir      -- Oral 2 times daily           Immunization History   Administered Date(s) Administered    Cytomegalovirus Immune Globulin 06/13/2014       Family History     Problem Relation (Age of Onset)    Drug abuse Maternal Grandfather     Thrombocytopenia Maternal Grandfather        Social History     Social History    Marital status: Single     Spouse name: N/A    Number of children: N/A    Years of education: N/A     Social History Main Topics    Smoking status: Never Smoker    Smokeless tobacco: Never Used    Alcohol use No      Comment: Has not had an alcoholic drink in more than 2 months.    Drug use: No    Sexual activity: Yes     Partners: Male     Birth control/ protection: Implant     Other Topics Concern    None     Social History Narrative    None     Review of Systems   Constitutional: Positive for chills, fatigue and fever. Negative for activity change, appetite change and diaphoresis.   HENT: Positive for congestion and postnasal drip. Negative for dental problem, drooling, ear discharge, ear pain, facial swelling, hearing loss, mouth sores, nosebleeds, rhinorrhea, sinus pain, sinus pressure, sneezing, sore throat and voice change.    Eyes: Negative for photophobia, pain, redness, itching and visual disturbance.   Respiratory: Positive for shortness of breath. Negative for cough, choking, chest tightness, wheezing and stridor.    Cardiovascular: Negative for chest pain, palpitations and leg swelling.   Gastrointestinal: Negative for abdominal distention, abdominal pain, constipation, diarrhea, nausea and vomiting.   Endocrine: Negative for cold intolerance, heat intolerance, polydipsia, polyphagia and polyuria.   Genitourinary: Negative for difficulty urinating, dysuria, flank pain, frequency and urgency.   Musculoskeletal: Negative for arthralgias, back pain, gait problem, myalgias, neck pain and neck stiffness.   Skin: Negative for color change and rash.   Allergic/Immunologic: Positive for immunocompromised state.   Neurological: Negative for dizziness, tremors, seizures, syncope, weakness, light-headedness, numbness and headaches.   Hematological: Negative for adenopathy.   Psychiatric/Behavioral: Negative for agitation  and confusion.     Objective:     Vital Signs (Most Recent):  Temp: 98.7 °F (37.1 °C) (03/19/18 0730)  Pulse: 88 (03/19/18 1140)  Resp: 18 (03/19/18 1140)  BP: 105/63 (03/19/18 1140)  SpO2: (!) 92 % (03/19/18 1140) Vital Signs (24h Range):  Temp:  [98.4 °F (36.9 °C)-98.9 °F (37.2 °C)] 98.7 °F (37.1 °C)  Pulse:  [] 88  Resp:  [16-20] 18  SpO2:  [91 %-93 %] 92 %  BP: ()/(56-70) 105/63     Weight: 60 kg (132 lb 4.4 oz)  Body mass index is 24.99 kg/m².    Estimated Creatinine Clearance: 76.7 mL/min (based on SCr of 0.9 mg/dL).    Physical Exam   Constitutional: She is oriented to person, place, and time. She appears well-developed and well-nourished. No distress.   HENT:   Head: Normocephalic and atraumatic.   Right Ear: External ear normal.   Left Ear: External ear normal.   Nose: Nose normal.   Mouth/Throat: Oropharynx is clear and moist. No oropharyngeal exudate.   Eyes: Conjunctivae and EOM are normal. Pupils are equal, round, and reactive to light. Right eye exhibits no discharge. Left eye exhibits no discharge. No scleral icterus.   Neck: Normal range of motion. Neck supple. No JVD present. No tracheal deviation present. No thyromegaly present.   Cardiovascular: Normal rate, regular rhythm, normal heart sounds and intact distal pulses.  Exam reveals no gallop and no friction rub.    No murmur heard.  Pulmonary/Chest: Effort normal. No accessory muscle usage. No tachypnea. No respiratory distress. She has no wheezes. She has no rhonchi. She has rales in the right lower field and the left lower field. She exhibits no tenderness.   Abdominal: Soft. Bowel sounds are normal. She exhibits no distension. There is no tenderness. There is no rebound and no guarding.   Musculoskeletal: Normal range of motion. She exhibits no edema, tenderness or deformity.   Lymphadenopathy:     She has no cervical adenopathy.   Neurological: She is alert and oriented to person, place, and time. No cranial nerve deficit.   Skin:  Skin is warm and dry. No rash noted. She is not diaphoretic. No erythema. No pallor.   Psychiatric: She has a normal mood and affect. Judgment normal.   Nursing note and vitals reviewed.      Significant Labs:   CBC:   Recent Labs  Lab 03/18/18 1918 03/19/18  0500   WBC 7.67 6.71   HGB 8.9* 8.9*   HCT 29.3* 29.6*   * 132*     CMP:   Recent Labs  Lab 03/18/18 1918 03/19/18  0500     137 138   K 4.7  4.7 4.1     106 109   CO2 22*  22* 20*   *  136* 69*   BUN 14  14 13   CREATININE 0.9  0.9 0.9   CALCIUM 9.0  9.0 8.1*   PROT 6.8  --    ALBUMIN 3.3*  --    BILITOT 0.5  --    ALKPHOS 132  --    AST 37  --    ALT 28  --    ANIONGAP 9  9 9   EGFRNONAA >60.0  >60.0 >60.0       Significant Imaging: I have reviewed all pertinent imaging results/findings within the past 24 hours.     CT sinuses:    The frontal sinuses are hypoplastic bilaterally with essentially complete bilateral opacification.    There is postoperative change of prior partial ethmoidectomies and bilateral uncinectomies.  There is significant severe  opacification of the residual ethmoid air cells, similar to prior examination.  The sphenoid sinuses are also hypoplastic bilaterally with mild/moderate mucosal thickening of the right ethmoid sinus, increased from prior examination.  There is essentially complete opacification of the left sphenoid sinus, the degree of which is also increased from prior CT examination of 03/30/2017.    There is significant mucosal thickening and lobular opacification of the bilateral maxillary sinuses, with the essentially complete opacification of the right maxillary antrum.  There is intermixed hyperdensity within sinonasal opacity, which can be seen with inspissated secretions versus fungal colonization.    There is leftward nasal septal deviation and small leftward projecting nasal spur.  Roofs of the ethmoids are relatively symmetric.  The lamina papyracea appear intact  bilaterally.    The globes and orbits appear within normal limits.  The visualized mastoid air cells are essentially clear.   Impression       Postoperative change of prior partial ethmoidectomies and bilateral uncinectomies with severe paranasal sinus opacification as detailed above.     Microbiology:  3/19 sputum cx: pending

## 2018-03-19 NOTE — SUBJECTIVE & OBJECTIVE
Subjective:     Interval History: No acute events overnight. Patient placed on Meropenem/Vanc since admission to OSH on 3/6, received tobramycin x 1 3/17 with some improvement of congestion and post-nasal drip. Continues to complain of left sided chest pain, unchanged from prior to admission, requesting more frequent dosing of narcotics. Currently 93% on room air. No other complaints at this time. OR bronchoscopy scheduled for tomorrow morning. CMV PCR pending.     Continuous Infusions:  Scheduled Meds:   ARIPiprazole  2 mg Oral QHS    calcium-vitamin D3  1 tablet Oral BID    dapsone  100 mg Oral Daily    docusate sodium  100 mg Oral BID    fluticasone  2 spray Each Nare Daily    folic acid  1 mg Oral Daily    gabapentin  1,100 mg Oral TID    lipase-protease-amylase 24,000-76,000-120,000 units  6 capsule Oral TID WM    magnesium oxide  400 mg Oral BID    meropenem (MERREM) IVPB  1 g Intravenous Q8H    mirtazapine  45 mg Oral QHS    montelukast  10 mg Oral Daily    morphine  15 mg Oral TID    multivitamin  1 tablet Oral Daily    pantoprazole  40 mg Oral Daily    predniSONE  5 mg Oral Daily    tacrolimus  3.5 mg Oral BID    topiramate  25 mg Oral BID    valGANciclovir  900 mg Oral BID    vancomycin (VANCOCIN) IVPB  1,000 mg Intravenous Q12H     PRN Meds:butalbital-acetaminophen-caffeine -40 mg, dextrose 50%, dextrose 50%, diphenhydrAMINE, glucagon (human recombinant), glucose, glucose, insulin aspart U-100, levalbuterol, LORazepam, ondansetron, oxyCODONE, promethazine, tiZANidine    Review of patient's allergies indicates:   Allergen Reactions    Albuterol Palpitations    Colistin Anaphylaxis    Vancomycin analogues      Infusion reaction that does not resolve with slowing    Neupogen [filgrastim] Other (See Comments)     Ostealgia after five daily doses of 300 mcg.      Bactrim [sulfamethoxazole-trimethoprim] Hives    Ceftazidime Hives     Pt stated can tolerate cefapine not  ceftazidime    Ceftazidime     Dronabinol Other (See Comments)     Mental changes/hallucinations    Haldol [haloperidol lactate] Other (See Comments)     Seizure like activity    Nsaids (non-steroidal anti-inflammatory drug)      Cannot have due to lung transplant    Adhesive Rash     Cloth tape- please use tegaderm or paper tape    Aztreonam Rash    Ciprofloxacin Nausea And Vomiting     Projectile N/V, per patient.  Unwilling to retry therapy.       Review of Systems   Constitutional: Positive for chills, fatigue and fever. Negative for activity change, appetite change and diaphoresis.   HENT: Positive for congestion and postnasal drip. Negative for dental problem, drooling, ear discharge, ear pain, facial swelling, hearing loss, mouth sores, nosebleeds, rhinorrhea, sinus pain, sinus pressure, sneezing, sore throat and voice change.    Eyes: Negative for photophobia, pain, redness, itching and visual disturbance.   Respiratory: Positive for shortness of breath. Negative for cough, choking, chest tightness, wheezing and stridor.    Cardiovascular: Negative for chest pain, palpitations and leg swelling.   Gastrointestinal: Negative for abdominal distention, abdominal pain, constipation, diarrhea, nausea and vomiting.   Endocrine: Negative for cold intolerance, heat intolerance, polydipsia, polyphagia and polyuria.   Genitourinary: Negative for difficulty urinating, dysuria, flank pain, frequency and urgency.   Musculoskeletal: Negative for arthralgias, back pain, gait problem, myalgias, neck pain and neck stiffness.   Skin: Negative for color change and rash.   Allergic/Immunologic: Positive for immunocompromised state.   Neurological: Negative for dizziness, tremors, seizures, syncope, weakness, light-headedness, numbness and headaches.   Hematological: Negative for adenopathy.   Psychiatric/Behavioral: Negative for agitation and confusion.     Objective:   Physical Exam   Constitutional: She is oriented to  person, place, and time. She appears well-developed and well-nourished. No distress.   HENT:   Head: Normocephalic and atraumatic.   Right Ear: External ear normal.   Left Ear: External ear normal.   Nose: Nose normal.   Mouth/Throat: Oropharynx is clear and moist. No oropharyngeal exudate.   Eyes: Conjunctivae and EOM are normal. Pupils are equal, round, and reactive to light. Right eye exhibits no discharge. Left eye exhibits no discharge. No scleral icterus.   Neck: Normal range of motion. Neck supple. No JVD present. No tracheal deviation present. No thyromegaly present.   Cardiovascular: Normal rate, regular rhythm, normal heart sounds and intact distal pulses.  Exam reveals no gallop and no friction rub.    No murmur heard.  Pulmonary/Chest: Effort normal. No accessory muscle usage. No tachypnea. No respiratory distress. She has no wheezes. She has no rhonchi. She has rales in the right lower field. She exhibits no tenderness.   Abdominal: Soft. Bowel sounds are normal. She exhibits no distension. There is no tenderness. There is no rebound and no guarding.   Musculoskeletal: Normal range of motion. She exhibits no edema, tenderness or deformity.   Lymphadenopathy:     She has no cervical adenopathy.   Neurological: She is alert and oriented to person, place, and time. No cranial nerve deficit.   Skin: Skin is warm and dry. No rash noted. She is not diaphoretic. No erythema. No pallor.   Psychiatric: She has a normal mood and affect. Judgment normal.   Nursing note and vitals reviewed.        Vital Signs (Most Recent):  Temp: 98.7 °F (37.1 °C) (03/19/18 0730)  Pulse: 85 (03/19/18 0730)  Resp: 16 (03/19/18 0730)  BP: 100/60 (03/19/18 0730)  SpO2: (!) 93 % (03/19/18 0730) Vital Signs (24h Range):  Temp:  [98.4 °F (36.9 °C)-98.9 °F (37.2 °C)] 98.7 °F (37.1 °C)  Pulse:  [] 85  Resp:  [16-20] 16  SpO2:  [91 %-93 %] 93 %  BP: ()/(56-70) 100/60     Weight: 60 kg (132 lb 4.4 oz)  Body mass index is 24.99  kg/m².      Intake/Output Summary (Last 24 hours) at 03/19/18 1108  Last data filed at 03/19/18 0849   Gross per 24 hour   Intake              840 ml   Output             1100 ml   Net             -260 ml       Significant Labs:  CBC:    Recent Labs  Lab 03/19/18  0500   WBC 6.71   RBC 3.07*   HGB 8.9*   HCT 29.6*   *   MCV 96   MCH 29.0   MCHC 30.1*     BMP:    Recent Labs  Lab 03/19/18  0500      K 4.1      CO2 20*   BUN 13   CREATININE 0.9   CALCIUM 8.1*      Tacrolimus Levels:    Recent Labs  Lab 03/19/18  0500   TACROLIMUS 11.3     Microbiology:  Microbiology Results (last 7 days)     Procedure Component Value Units Date/Time    Culture, Respiratory with Gram Stain [[id number]]     Order Status:  No result Specimen:  Respiratory from Sputum, Expectorated           I have reviewed all pertinent labs within the past 24 hours.    Diagnostic Results:  Labs: Reviewed  X-Ray: Reviewed

## 2018-03-19 NOTE — PROGRESS NOTES
Admit Note     Met with patient to assess needs. Patient is a 28 y.o. single female, admitted for a sinus infection. Pt to get a bronch later in the week. Pt is s/p lung transplant from 6/12/2014.      Patient admitted from home on 3/18/2018 .  At this time, patient presents as alert and oriented x 4, pleasant, good eye contact, well groomed, recall good, concentration/judgement good, average intelligence, calm, communicative, cooperative and asking and answering questions appropriately. Pt did appear eager to get pain medications and making demands of RN while SW present. At this time, patients caregiver not at the bedside.     Household/Family Systems     Patient resides with patient's mother, at [address] [place] [address].  Pt does stay with her boyfriend Shawn Castrejon much of the time as well. Support system includes pt's mother Kim Ibarra, aunt Mariel Calero, and Shawn Castrejon, boyfriend for over a year.  Patient does not have dependents that are need of being cared for.     Patients primary caregivers are Kim Ibarra, patients mother, phone number [phone] and Shawn Castrejon, patient's boyfriend, phone number [phone].  Confirmed patients contact information is [phone] (home);   Telephone Information:   Mobile [phone]     During admission, patient's caregiver plans to stay at home.  Confirmed patient and patients caregivers do have access to reliable transportation.    Cognitive Status/Learning     Patient reports reading ability as 8th grade and states patient does have difficulty with short-term memory.  Patient reports patient learns best by visual/auditory/hands on information.   Needed: No.   Highest education level: Grade School (0-8)    Vocation/Disability     Working for Income: No  If no, reason not working: Disability  Patient is disabled due to Cystic Fibrosis since 2004. Pt was taking classes part-time at [employer] in [place] but  dropped out due to illness. Pt reports that she does not plan on returning.     Adherence     Patient reports a high level of adherence to patients health care regimen although there is concern from team that pt has a pain medication dependence. Pt also prescribed Ativan 1mg every 6 hours as needed. SW concerned that pt could be overusing.  Adherence counseling and education provided. Patient verbalizes understanding.    Substance Use    Patient reports the following substance usage.    Tobacco: none, patient denies any use.   Alcohol: none, patient denies any use.  Illicit Drugs/Non-prescribed Medications: none, patient denies any use. Please note that team concerned for some dependence on pain medications/anxiety medication. Pt denies. Team reports pt fired from her pain mgmt doctor but now being prescribed pain medication from her PCP.   Patient states clear understanding of the potential impact of substance use.  Substance abstinence/cessation counseling, education and resources provided and reviewed.     Services Utilizing/ADLS    Infusion Service: Prior to admission, patient utilizing? no; Pt has a port but receives her IV meds as an inpatient due to her report of bad side effects  Home Health: Prior to admission, patient utilizing? no  DME: Prior to admission, no  Pulmonary/Cardiac Rehab: Prior to admission, no  Dialysis:  Prior to admission, no  Transplant Specialty Pharmacy:  Prior to admission, yes; Pt uses [facility] (mail-delivery) for transplant medications and [facility] at [address] [place] [phone] for non-transplant medications.    Prior to admission, patient reports patient was independent with ADLS and was driving.  Patient reports patient is not able to care for self at this time due to compromised medical condition (as documented in medical record) and physical weakness..  Patient indicates a willingness to care for self once medically cleared to do  so.    Insurance/Medications    Insured by   Payor/Plan Subscr  Sex Relation Sub. Ins. ID Effective Group Num   1. MEDICAID - * ELOISE WILLIAM* 1990 Female  [id number] 2/1/15 ISABELLA                                   [place] [address] [address]      Primary Insurance (for UNOS reporting): Public Insurance - Medicaid-[facility]  Secondary Insurance (for UNOS reporting): None    Patient reports patient is able to obtain and afford medications at this time and at time of discharge. Pt previously reported she was struggling to pain her pain management doctor as he was $150 every visit. Pt then stated that she stopped going and now gets her pain meds through her PCP. Pt denies any issues with medication costs at this time.     Living Will/Healthcare Power of     Patient states patient does not have a LW and/or HCPA on file even though it is documented that these are received.   provided education regarding LW and HCPA and the completion of forms.    Coping/Mental Health    Patient is coping well with the aid of  family members, friends and boyfriend. Pt reports that her mood is stable but reports struggling with anxiety in the past. Pt is on Abilify, Ambien, Ativan and Remeron .  Patient indicates mental health difficulties that are currently being managed with medication. Pt reports that she is up and down with regard to whether or not she'd like to see a therapist. During last admit, pt reported she didn't see a therapist because she couldn't afford this due to cost of seeing her pain mgmt doctor. SW recommended that pt think about seeing a therapist now that she no longer has the cost issues related to pain mgmt. Pt remains ambivalent.    Discharge Planning    At time of discharge, patient plans to return to patient's home under the care of pt's mother or boyfriend.  Patients mother and or boyfriend will transport patient.  Per rounds today, expected discharge date has not been medically determined at this  time. Patient and patients caregiver  verbalize understanding and are involved in treatment planning and discharge process.    Additional Concerns    Patient is being followed for needs, education, resources, information, emotional support, supportive counseling, and for supportive and skilled discharge plan of care.  providing ongoing psychosocial support, education, resources and d/c planning as needed.  SW remains available.  provided resource list, patient choice, psychosocial and supportive counseling, resources, education, assistance and discharge planning with patient and caregiver involvement, ongoing SW availability and services as appropriate.

## 2018-03-20 ENCOUNTER — ANESTHESIA (OUTPATIENT)
Dept: SURGERY | Facility: HOSPITAL | Age: 28
DRG: 167 | End: 2018-03-20
Payer: MEDICAID

## 2018-03-20 ENCOUNTER — SURGERY (OUTPATIENT)
Age: 28
End: 2018-03-20

## 2018-03-20 LAB
ANION GAP SERPL CALC-SCNC: 6 MMOL/L
APPEARANCE FLD: NORMAL
BASOPHILS # BLD AUTO: 0.02 K/UL
BASOPHILS NFR BLD: 0.4 %
BODY FLD TYPE: NORMAL
BUN SERPL-MCNC: 16 MG/DL
CALCIUM SERPL-MCNC: 8.6 MG/DL
CHLORIDE SERPL-SCNC: 106 MMOL/L
CMV DNA SERPL NAA+PROBE-ACNC: NORMAL IU/ML
CO2 SERPL-SCNC: 26 MMOL/L
COLOR FLD: COLORLESS
CREAT SERPL-MCNC: 1 MG/DL
DIFFERENTIAL METHOD: ABNORMAL
EOSINOPHIL # BLD AUTO: 0.1 K/UL
EOSINOPHIL NFR BLD: 2.8 %
ERYTHROCYTE [DISTWIDTH] IN BLOOD BY AUTOMATED COUNT: 14.1 %
EST. GFR  (AFRICAN AMERICAN): >60 ML/MIN/1.73 M^2
EST. GFR  (NON AFRICAN AMERICAN): >60 ML/MIN/1.73 M^2
GALACTOMANNAN AG SERPL IA-ACNC: <0.5 INDEX
GLUCOSE SERPL-MCNC: 87 MG/DL
GRAM STN SPEC: NORMAL
GRAM STN SPEC: NORMAL
HCT VFR BLD AUTO: 28.2 %
HGB BLD-MCNC: 8.7 G/DL
IMM GRANULOCYTES # BLD AUTO: 0.02 K/UL
IMM GRANULOCYTES NFR BLD AUTO: 0.4 %
LYMPHOCYTES # BLD AUTO: 2 K/UL
LYMPHOCYTES NFR BLD: 41.3 %
LYMPHOCYTES NFR FLD MANUAL: 15 %
MAGNESIUM SERPL-MCNC: 1.6 MG/DL
MCH RBC QN AUTO: 29 PG
MCHC RBC AUTO-ENTMCNC: 30.9 G/DL
MCV RBC AUTO: 94 FL
MONOCYTES # BLD AUTO: 0.4 K/UL
MONOCYTES NFR BLD: 8.5 %
MONOS+MACROS NFR FLD MANUAL: 56 %
NEUTROPHILS # BLD AUTO: 2.3 K/UL
NEUTROPHILS NFR BLD: 46.6 %
NEUTROPHILS NFR FLD MANUAL: 29 %
NRBC BLD-RTO: 0 /100 WBC
PLATELET # BLD AUTO: 146 K/UL
PMV BLD AUTO: 10.3 FL
POCT GLUCOSE: 114 MG/DL (ref 70–110)
POCT GLUCOSE: 159 MG/DL (ref 70–110)
POCT GLUCOSE: 82 MG/DL (ref 70–110)
POCT GLUCOSE: 93 MG/DL (ref 70–110)
POTASSIUM SERPL-SCNC: 4.2 MMOL/L
RBC # BLD AUTO: 3 M/UL
SODIUM SERPL-SCNC: 138 MMOL/L
TACROLIMUS BLD-MCNC: 11.3 NG/ML
VANCOMYCIN TROUGH SERPL-MCNC: 31.5 UG/ML
WBC # BLD AUTO: 4.94 K/UL
WBC # FLD: 278 /CU MM

## 2018-03-20 PROCEDURE — 88312 SPECIAL STAINS GROUP 1: CPT | Performed by: PATHOLOGY

## 2018-03-20 PROCEDURE — 86403 PARTICLE AGGLUT ANTBDY SCRN: CPT

## 2018-03-20 PROCEDURE — 87075 CULTR BACTERIA EXCEPT BLOOD: CPT

## 2018-03-20 PROCEDURE — 63600175 PHARM REV CODE 636 W HCPCS: Performed by: NURSE ANESTHETIST, CERTIFIED REGISTERED

## 2018-03-20 PROCEDURE — 87070 CULTURE OTHR SPECIMN AEROBIC: CPT

## 2018-03-20 PROCEDURE — 31628 BRONCHOSCOPY/LUNG BX EACH: CPT | Mod: RT,,, | Performed by: INTERNAL MEDICINE

## 2018-03-20 PROCEDURE — 25000003 PHARM REV CODE 250: Performed by: INTERNAL MEDICINE

## 2018-03-20 PROCEDURE — 87015 SPECIMEN INFECT AGNT CONCNTJ: CPT

## 2018-03-20 PROCEDURE — 25000003 PHARM REV CODE 250: Performed by: PHYSICIAN ASSISTANT

## 2018-03-20 PROCEDURE — 87116 MYCOBACTERIA CULTURE: CPT

## 2018-03-20 PROCEDURE — 87102 FUNGUS ISOLATION CULTURE: CPT

## 2018-03-20 PROCEDURE — 71000039 HC RECOVERY, EACH ADD'L HOUR: Performed by: INTERNAL MEDICINE

## 2018-03-20 PROCEDURE — 88305 TISSUE EXAM BY PATHOLOGIST: CPT | Performed by: PATHOLOGY

## 2018-03-20 PROCEDURE — 87206 SMEAR FLUORESCENT/ACID STAI: CPT

## 2018-03-20 PROCEDURE — 27000221 HC OXYGEN, UP TO 24 HOURS

## 2018-03-20 PROCEDURE — 87102 FUNGUS ISOLATION CULTURE: CPT | Mod: 59

## 2018-03-20 PROCEDURE — 37000008 HC ANESTHESIA 1ST 15 MINUTES: Performed by: INTERNAL MEDICINE

## 2018-03-20 PROCEDURE — 25000003 PHARM REV CODE 250: Performed by: NURSE ANESTHETIST, CERTIFIED REGISTERED

## 2018-03-20 PROCEDURE — 31624 DX BRONCHOSCOPE/LAVAGE: CPT | Mod: 59,RT,, | Performed by: INTERNAL MEDICINE

## 2018-03-20 PROCEDURE — 85025 COMPLETE CBC W/AUTO DIFF WBC: CPT

## 2018-03-20 PROCEDURE — 20600001 HC STEP DOWN PRIVATE ROOM

## 2018-03-20 PROCEDURE — 88312 SPECIAL STAINS GROUP 1: CPT | Mod: 26,,, | Performed by: PATHOLOGY

## 2018-03-20 PROCEDURE — 89051 BODY FLUID CELL COUNT: CPT

## 2018-03-20 PROCEDURE — 80048 BASIC METABOLIC PNL TOTAL CA: CPT

## 2018-03-20 PROCEDURE — 0BBF8ZX EXCISION OF RIGHT LOWER LUNG LOBE, VIA NATURAL OR ARTIFICIAL OPENING ENDOSCOPIC, DIAGNOSTIC: ICD-10-PCS | Performed by: INTERNAL MEDICINE

## 2018-03-20 PROCEDURE — 88305 TISSUE EXAM BY PATHOLOGIST: CPT | Mod: 26,,, | Performed by: PATHOLOGY

## 2018-03-20 PROCEDURE — 87205 SMEAR GRAM STAIN: CPT

## 2018-03-20 PROCEDURE — 87496 CYTOMEG DNA AMP PROBE: CPT

## 2018-03-20 PROCEDURE — 99232 SBSQ HOSP IP/OBS MODERATE 35: CPT | Mod: ,,, | Performed by: INTERNAL MEDICINE

## 2018-03-20 PROCEDURE — 63600175 PHARM REV CODE 636 W HCPCS: Performed by: INTERNAL MEDICINE

## 2018-03-20 PROCEDURE — D9220A PRA ANESTHESIA: Mod: ANES,,, | Performed by: ANESTHESIOLOGY

## 2018-03-20 PROCEDURE — 87541 LEGION PNEUMO DNA AMP PROB: CPT

## 2018-03-20 PROCEDURE — 36000707: Performed by: INTERNAL MEDICINE

## 2018-03-20 PROCEDURE — 82962 GLUCOSE BLOOD TEST: CPT | Performed by: INTERNAL MEDICINE

## 2018-03-20 PROCEDURE — D9220A PRA ANESTHESIA: Mod: CRNA,,, | Performed by: NURSE ANESTHETIST, CERTIFIED REGISTERED

## 2018-03-20 PROCEDURE — 71000033 HC RECOVERY, INTIAL HOUR: Performed by: INTERNAL MEDICINE

## 2018-03-20 PROCEDURE — 0B9D8ZX DRAINAGE OF RIGHT MIDDLE LUNG LOBE, VIA NATURAL OR ARTIFICIAL OPENING ENDOSCOPIC, DIAGNOSTIC: ICD-10-PCS | Performed by: INTERNAL MEDICINE

## 2018-03-20 PROCEDURE — 80197 ASSAY OF TACROLIMUS: CPT

## 2018-03-20 PROCEDURE — 94761 N-INVAS EAR/PLS OXIMETRY MLT: CPT

## 2018-03-20 PROCEDURE — 88313 SPECIAL STAINS GROUP 2: CPT | Mod: 26,,, | Performed by: PATHOLOGY

## 2018-03-20 PROCEDURE — 87070 CULTURE OTHR SPECIMN AEROBIC: CPT | Mod: 59

## 2018-03-20 PROCEDURE — 37000009 HC ANESTHESIA EA ADD 15 MINS: Performed by: INTERNAL MEDICINE

## 2018-03-20 PROCEDURE — 83735 ASSAY OF MAGNESIUM: CPT

## 2018-03-20 PROCEDURE — 99232 SBSQ HOSP IP/OBS MODERATE 35: CPT | Mod: 25,,, | Performed by: INTERNAL MEDICINE

## 2018-03-20 PROCEDURE — 99231 SBSQ HOSP IP/OBS SF/LOW 25: CPT | Mod: ,,, | Performed by: OTOLARYNGOLOGY

## 2018-03-20 PROCEDURE — 87205 SMEAR GRAM STAIN: CPT | Mod: 59

## 2018-03-20 PROCEDURE — 36000706: Performed by: INTERNAL MEDICINE

## 2018-03-20 PROCEDURE — 80202 ASSAY OF VANCOMYCIN: CPT

## 2018-03-20 PROCEDURE — 87305 ASPERGILLUS AG IA: CPT

## 2018-03-20 RX ORDER — PROMETHAZINE HYDROCHLORIDE 25 MG/ML
INJECTION, SOLUTION INTRAMUSCULAR; INTRAVENOUS
Status: COMPLETED
Start: 2018-03-20 | End: 2018-03-20

## 2018-03-20 RX ORDER — HEPARIN 100 UNIT/ML
5 SYRINGE INTRAVENOUS
Status: DISCONTINUED | OUTPATIENT
Start: 2018-03-20 | End: 2018-03-22 | Stop reason: HOSPADM

## 2018-03-20 RX ORDER — HEPARIN 100 UNIT/ML
100 SYRINGE INTRAVENOUS
Status: DISCONTINUED | OUTPATIENT
Start: 2018-03-20 | End: 2018-03-22 | Stop reason: HOSPADM

## 2018-03-20 RX ORDER — AMLODIPINE BESYLATE 5 MG/1
5 TABLET ORAL DAILY
Status: DISCONTINUED | OUTPATIENT
Start: 2018-03-20 | End: 2018-03-22

## 2018-03-20 RX ORDER — PROMETHAZINE HYDROCHLORIDE 25 MG/ML
INJECTION, SOLUTION INTRAMUSCULAR; INTRAVENOUS
Status: DISCONTINUED | OUTPATIENT
Start: 2018-03-20 | End: 2018-03-20

## 2018-03-20 RX ORDER — PROPOFOL 10 MG/ML
VIAL (ML) INTRAVENOUS
Status: DISCONTINUED | OUTPATIENT
Start: 2018-03-20 | End: 2018-03-20

## 2018-03-20 RX ORDER — PROPOFOL 10 MG/ML
VIAL (ML) INTRAVENOUS CONTINUOUS PRN
Status: DISCONTINUED | OUTPATIENT
Start: 2018-03-20 | End: 2018-03-20

## 2018-03-20 RX ORDER — HEPARIN SODIUM (PORCINE) LOCK FLUSH IV SOLN 100 UNIT/ML 100 UNIT/ML
100 SOLUTION INTRAVENOUS
Status: DISCONTINUED | OUTPATIENT
Start: 2018-03-20 | End: 2018-03-20

## 2018-03-20 RX ORDER — KETAMINE HCL IN 0.9 % NACL 50 MG/5 ML
SYRINGE (ML) INTRAVENOUS
Status: DISCONTINUED | OUTPATIENT
Start: 2018-03-20 | End: 2018-03-20

## 2018-03-20 RX ORDER — MIDAZOLAM HYDROCHLORIDE 1 MG/ML
INJECTION, SOLUTION INTRAMUSCULAR; INTRAVENOUS
Status: DISCONTINUED | OUTPATIENT
Start: 2018-03-20 | End: 2018-03-20

## 2018-03-20 RX ORDER — SODIUM CHLORIDE 9 MG/ML
INJECTION, SOLUTION INTRAVENOUS CONTINUOUS PRN
Status: DISCONTINUED | OUTPATIENT
Start: 2018-03-20 | End: 2018-03-20

## 2018-03-20 RX ADMIN — TACROLIMUS 3.5 MG: 1 CAPSULE ORAL at 06:03

## 2018-03-20 RX ADMIN — OXYCODONE HYDROCHLORIDE 10 MG: 5 TABLET ORAL at 05:03

## 2018-03-20 RX ADMIN — DIPHENHYDRAMINE HYDROCHLORIDE 50 MG: 50 INJECTION, SOLUTION INTRAMUSCULAR; INTRAVENOUS at 11:03

## 2018-03-20 RX ADMIN — PANCRELIPASE 6 CAPSULE: 24000; 76000; 120000 CAPSULE, DELAYED RELEASE PELLETS ORAL at 12:03

## 2018-03-20 RX ADMIN — TIZANIDINE 4 MG: 4 TABLET ORAL at 11:03

## 2018-03-20 RX ADMIN — TOPIRAMATE 25 MG: 25 TABLET, FILM COATED ORAL at 08:03

## 2018-03-20 RX ADMIN — OYSTER SHELL CALCIUM WITH VITAMIN D 1 TABLET: 500; 200 TABLET, FILM COATED ORAL at 09:03

## 2018-03-20 RX ADMIN — OXYCODONE HYDROCHLORIDE 10 MG: 5 TABLET ORAL at 06:03

## 2018-03-20 RX ADMIN — MAGNESIUM OXIDE TAB 400 MG (241.3 MG ELEMENTAL MG) 400 MG: 400 (241.3 MG) TAB at 09:03

## 2018-03-20 RX ADMIN — MEROPENEM 1 G: 1 INJECTION, POWDER, FOR SOLUTION INTRAVENOUS at 05:03

## 2018-03-20 RX ADMIN — VALGANCICLOVIR 900 MG: 450 TABLET, FILM COATED ORAL at 08:03

## 2018-03-20 RX ADMIN — PANCRELIPASE 4 CAPSULE: 24000; 76000; 120000 CAPSULE, DELAYED RELEASE PELLETS ORAL at 11:03

## 2018-03-20 RX ADMIN — PROPOFOL 150 MG: 10 INJECTION, EMULSION INTRAVENOUS at 07:03

## 2018-03-20 RX ADMIN — MORPHINE SULFATE 15 MG: 15 TABLET, EXTENDED RELEASE ORAL at 09:03

## 2018-03-20 RX ADMIN — HEPARIN 500 UNITS: 100 SYRINGE at 06:03

## 2018-03-20 RX ADMIN — MEROPENEM 1 G: 1 INJECTION, POWDER, FOR SOLUTION INTRAVENOUS at 02:03

## 2018-03-20 RX ADMIN — LORAZEPAM 1 MG: 1 TABLET ORAL at 11:03

## 2018-03-20 RX ADMIN — OXYCODONE HYDROCHLORIDE 10 MG: 5 TABLET ORAL at 12:03

## 2018-03-20 RX ADMIN — MAGNESIUM OXIDE TAB 400 MG (241.3 MG ELEMENTAL MG) 400 MG: 400 (241.3 MG) TAB at 08:03

## 2018-03-20 RX ADMIN — PROMETHAZINE HYDROCHLORIDE 25 MG: 25 INJECTION INTRAMUSCULAR; INTRAVENOUS at 07:03

## 2018-03-20 RX ADMIN — DIPHENHYDRAMINE HYDROCHLORIDE 50 MG: 50 INJECTION, SOLUTION INTRAMUSCULAR; INTRAVENOUS at 09:03

## 2018-03-20 RX ADMIN — OXYCODONE HYDROCHLORIDE 10 MG: 5 TABLET ORAL at 11:03

## 2018-03-20 RX ADMIN — Medication 1 G: at 09:03

## 2018-03-20 RX ADMIN — AMLODIPINE BESYLATE 5 MG: 5 TABLET ORAL at 12:03

## 2018-03-20 RX ADMIN — OYSTER SHELL CALCIUM WITH VITAMIN D 1 TABLET: 500; 200 TABLET, FILM COATED ORAL at 08:03

## 2018-03-20 RX ADMIN — GABAPENTIN 1100 MG: 400 CAPSULE ORAL at 02:03

## 2018-03-20 RX ADMIN — GABAPENTIN 1100 MG: 400 CAPSULE ORAL at 08:03

## 2018-03-20 RX ADMIN — DAPSONE 100 MG: 100 TABLET ORAL at 09:03

## 2018-03-20 RX ADMIN — THERA TABS 1 TABLET: TAB at 09:03

## 2018-03-20 RX ADMIN — MIRTAZAPINE 45 MG: 15 TABLET, FILM COATED ORAL at 08:03

## 2018-03-20 RX ADMIN — FLUTICASONE PROPIONATE 100 MCG: 50 SPRAY, METERED NASAL at 09:03

## 2018-03-20 RX ADMIN — MIDAZOLAM HYDROCHLORIDE 2 MG: 1 INJECTION, SOLUTION INTRAMUSCULAR; INTRAVENOUS at 07:03

## 2018-03-20 RX ADMIN — PROPOFOL 150 MCG/KG/MIN: 10 INJECTION, EMULSION INTRAVENOUS at 07:03

## 2018-03-20 RX ADMIN — TOPIRAMATE 25 MG: 25 TABLET, FILM COATED ORAL at 09:03

## 2018-03-20 RX ADMIN — TACROLIMUS 3.5 MG: 1 CAPSULE ORAL at 09:03

## 2018-03-20 RX ADMIN — DIPHENHYDRAMINE HYDROCHLORIDE 50 MG: 50 INJECTION, SOLUTION INTRAMUSCULAR; INTRAVENOUS at 03:03

## 2018-03-20 RX ADMIN — MORPHINE SULFATE 15 MG: 15 TABLET, EXTENDED RELEASE ORAL at 02:03

## 2018-03-20 RX ADMIN — GABAPENTIN 1100 MG: 400 CAPSULE ORAL at 09:03

## 2018-03-20 RX ADMIN — SODIUM CHLORIDE: 0.9 INJECTION, SOLUTION INTRAVENOUS at 07:03

## 2018-03-20 RX ADMIN — FOLIC ACID 1 MG: 1 TABLET ORAL at 09:03

## 2018-03-20 RX ADMIN — PREDNISONE 5 MG: 5 TABLET ORAL at 09:03

## 2018-03-20 RX ADMIN — PANTOPRAZOLE SODIUM 40 MG: 40 TABLET, DELAYED RELEASE ORAL at 09:03

## 2018-03-20 RX ADMIN — VALGANCICLOVIR 900 MG: 450 TABLET, FILM COATED ORAL at 09:03

## 2018-03-20 RX ADMIN — LORAZEPAM 1 MG: 1 TABLET ORAL at 09:03

## 2018-03-20 RX ADMIN — Medication 20 MG: at 07:03

## 2018-03-20 RX ADMIN — MONTELUKAST SODIUM 10 MG: 10 TABLET, FILM COATED ORAL at 09:03

## 2018-03-20 RX ADMIN — DOCUSATE SODIUM 100 MG: 100 CAPSULE, LIQUID FILLED ORAL at 09:03

## 2018-03-20 RX ADMIN — MEROPENEM 1 G: 1 INJECTION, POWDER, FOR SOLUTION INTRAVENOUS at 09:03

## 2018-03-20 RX ADMIN — ARIPIPRAZOLE 2 MG: 2 TABLET ORAL at 08:03

## 2018-03-20 NOTE — PROGRESS NOTES
[facility]  Lung Transplant  Progress Note - Floor    Patient Name: Juanita Ibarra  MRN: [id number]  Admission Date: 3/18/2018  Hospital Length of Stay: 2 days  Post-Operative Day: 1377  Attending Physician: Jake Alvarez MD  Primary Care Provider: Jake Alvarez MD     Subjective:     Interval History: No acute events overnight. Patient placed on Meropenem/Vanc since admission to OSH on 3/6, received tobramycin x 1 3/17 with some improvement of congestion and post-nasal drip. Underwent bronchoscopy in OR this AM without complications, BAL results pending. Currently 92% on room air.     Continuous Infusions:  Scheduled Meds:   ARIPiprazole  2 mg Oral QHS    calcium-vitamin D3  1 tablet Oral BID    dapsone  100 mg Oral Daily    docusate sodium  100 mg Oral BID    fluticasone  2 spray Each Nare Daily    folic acid  1 mg Oral Daily    gabapentin  1,100 mg Oral TID    lipase-protease-amylase 24,000-76,000-120,000 units  6 capsule Oral TID WM    magnesium oxide  400 mg Oral BID    meropenem (MERREM) IVPB  1 g Intravenous Q8H    mirtazapine  45 mg Oral QHS    montelukast  10 mg Oral Daily    morphine  15 mg Oral TID    multivitamin  1 tablet Oral Daily    pantoprazole  40 mg Oral Daily    predniSONE  5 mg Oral Daily    tacrolimus  3.5 mg Oral BID    topiramate  25 mg Oral BID    valGANciclovir  900 mg Oral BID    vancomycin (VANCOCIN) IVPB  1,000 mg Intravenous Q12H     PRN Meds:butalbital-acetaminophen-caffeine -40 mg, dextrose 50%, dextrose 50%, diphenhydrAMINE, glucagon (human recombinant), glucose, glucose, insulin aspart U-100, levalbuterol, LORazepam, ondansetron, oxyCODONE, promethazine, tiZANidine    Review of patient's allergies indicates:   Allergen Reactions    Albuterol Palpitations    Colistin Anaphylaxis    Vancomycin analogues      Infusion reaction that does not resolve with slowing    Neupogen [filgrastim] Other (See Comments)      Ostealgia after five daily doses of 300 mcg.      Bactrim [sulfamethoxazole-trimethoprim] Hives    Ceftazidime Hives     Pt stated can tolerate cefapine not ceftazidime    Ceftazidime     Dronabinol Other (See Comments)     Mental changes/hallucinations    Haldol [haloperidol lactate] Other (See Comments)     Seizure like activity    Nsaids (non-steroidal anti-inflammatory drug)      Cannot have due to lung transplant    Adhesive Rash     Cloth tape- please use tegaderm or paper tape    Aztreonam Rash    Ciprofloxacin Nausea And Vomiting     Projectile N/V, per patient.  Unwilling to retry therapy.       Review of Systems   Constitutional: Positive for chills, fatigue and fever. Negative for activity change, appetite change and diaphoresis.   HENT: Positive for congestion and postnasal drip. Negative for dental problem, drooling, ear discharge, ear pain, facial swelling, hearing loss, mouth sores, nosebleeds, rhinorrhea, sinus pain, sinus pressure, sneezing, sore throat and voice change.    Eyes: Negative for photophobia, pain, redness, itching and visual disturbance.   Respiratory: Positive for shortness of breath. Negative for cough, choking, chest tightness, wheezing and stridor.    Cardiovascular: Negative for chest pain, palpitations and leg swelling.   Gastrointestinal: Negative for abdominal distention, abdominal pain, constipation, diarrhea, nausea and vomiting.   Endocrine: Negative for cold intolerance, heat intolerance, polydipsia, polyphagia and polyuria.   Genitourinary: Negative for difficulty urinating, dysuria, flank pain, frequency and urgency.   Musculoskeletal: Negative for arthralgias, back pain, gait problem, myalgias, neck pain and neck stiffness.   Skin: Negative for color change and rash.   Allergic/Immunologic: Positive for immunocompromised state.   Neurological: Negative for dizziness, tremors, seizures, syncope, weakness, light-headedness, numbness and headaches.   Hematological:  Negative for adenopathy.   Psychiatric/Behavioral: Negative for agitation and confusion.     Objective:   Physical Exam   Constitutional: She is oriented to person, place, and time. She appears well-developed and well-nourished. No distress.   HENT:   Head: Normocephalic and atraumatic.   Right Ear: External ear normal.   Left Ear: External ear normal.   Nose: Nose normal.   Mouth/Throat: Oropharynx is clear and moist. No oropharyngeal exudate.   Eyes: Conjunctivae and EOM are normal. Pupils are equal, round, and reactive to light. Right eye exhibits no discharge. Left eye exhibits no discharge. No scleral icterus.   Neck: Normal range of motion. Neck supple. No JVD present. No tracheal deviation present. No thyromegaly present.   Cardiovascular: Normal rate, regular rhythm, normal heart sounds and intact distal pulses.  Exam reveals no gallop and no friction rub.    No murmur heard.  Pulmonary/Chest: Effort normal. No accessory muscle usage. No tachypnea. No respiratory distress. She has no wheezes. She has no rhonchi. She has rales in the right lower field. She exhibits no tenderness.   Abdominal: Soft. Bowel sounds are normal. She exhibits no distension. There is no tenderness. There is no rebound and no guarding.   Musculoskeletal: Normal range of motion. She exhibits no edema, tenderness or deformity.   Lymphadenopathy:     She has no cervical adenopathy.   Neurological: She is alert and oriented to person, place, and time. No cranial nerve deficit.   Skin: Skin is warm and dry. No rash noted. She is not diaphoretic. No erythema. No pallor.   Psychiatric: She has a normal mood and affect. Judgment normal.   Nursing note and vitals reviewed.        Vital Signs (Most Recent):  Temp: 98.1 °F (36.7 °C) (03/20/18 0900)  Pulse: 85 (03/20/18 0945)  Resp: 13 (03/20/18 0900)  BP: 118/79 (03/20/18 0945)  SpO2: (!) 92 % (03/20/18 0945) Vital Signs (24h Range):  Temp:  [97.8 °F (36.6 °C)-98.5 °F (36.9 °C)] 98.1 °F (36.7  °C)  Pulse:  [] 85  Resp:  [13-20] 13  SpO2:  [90 %-98 %] 92 %  BP: ()/(59-79) 118/79     Weight: 59.9 kg (132 lb)  Body mass index is 24.94 kg/m².      Intake/Output Summary (Last 24 hours) at 03/20/18 1031  Last data filed at 03/20/18 0753   Gross per 24 hour   Intake             1290 ml   Output              350 ml   Net              940 ml       Significant Labs:  CBC:    Recent Labs  Lab 03/20/18  0500   WBC 4.94   RBC 3.00*   HGB 8.7*   HCT 28.2*   *   MCV 94   MCH 29.0   MCHC 30.9*     BMP:    Recent Labs  Lab 03/20/18  0500      K 4.2      CO2 26   BUN 16   CREATININE 1.0   CALCIUM 8.6*      Tacrolimus Levels:    Recent Labs  Lab 03/19/18  0500   TACROLIMUS 11.3     Microbiology:  Microbiology Results (last 7 days)     Procedure Component Value Units Date/Time    Cryptococcal antigen [[id number]] Collected:  03/20/18 1027    Order Status:  Sent Specimen:  Blood from Blood Updated:  03/20/18 1027    Fungus culture [[id number]] Collected:  03/20/18 0742    Order Status:  Sent Specimen:  Respiratory from BAL, RML Updated:  03/20/18 0837    AFB Culture & Smear [[id number]] Collected:  03/20/18 0742    Order Status:  Sent Specimen:  Respiratory from BAL, RML Updated:  03/20/18 0837    Culture, Respiratory [[id number]] Collected:  03/20/18 0742    Order Status:  Sent Specimen:  Respiratory from BAL, RML Updated:  03/20/18 0836    Culture, Respiratory with Gram Stain [[id number]] Collected:  03/20/18 0742    Order Status:  Sent Specimen:  Respiratory from Sputum, Expectorated Updated:  03/20/18 0836          I have reviewed all pertinent labs within the past 24 hours.    Diagnostic Results:  Labs: Reviewed  X-Ray: Reviewed      Assessment/Plan:     * Hospital acquired PNA    Pa/lateral CXR from admission with RLL infiltrate, +crackles RLL on exam, Procalcitonin 0.16.   WBC - 4.94 today, remains afebrile, VSS, was placed on Merropenem/Vanc whilein [place] on 3/6 admission, received  tobramycin x 1 3/18.     OR bronch from this morning revealed stenotic surgical anastomosis with minor airway obstruction, as well as copious, tan/brown, thick secretions throughout the tracheobronchial tree. BAL results pending.  Continue Merropenem/Vanc, ID following, appreciate recs.   Continue daily labs.        Lung transplant status, bilateral    Received BLT on 6/12/2014 for cystic fibrosis.  Has known CARLOS EDUARDO grade 3.    Continue immunosuppressives and prophylactic antibiotics.         Prophylactic antibiotic    On Dapsone 100mg daily as an outpatient.  CMV D+/R- with hx of viremia.     Continue Dapsone. CMV PCR results pending, patient placed on valganciclovir 900 mg BID.         Immunosuppression    On Tacrolimus 3.5mg BID, MMF 500mg BID, and Prednisone 5mg as an outpatient.      Will continue Tacrolimus at 3.5mg BID and Prednisone 5mg for now.  Continue to hold MMF in the setting of acute infection.  Will follow daily Tacrolimus levels and adjust as needed, today's level is pending.         Bronchiolitis obliterans syndrome    Has known CARLOS EDUARDO grade 3 but remains with good functional status and does not require supplemental oxygen.    No further interventions at this time.        Sinus infection    Admitted in [place] on 3/6/2018 for acute sinusitis and was to complete 14 days of inpatient IV therapy.  Developed fevers up to 101.8F on 3/17/2018 and was transferred to [facility] for further management.      No culture data available from OSH.  Most recent cultures here have shown MRSA/Pseudomonas in 2017.  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    Will continue current IV therapy with Vanc/Merrem--pre medication with IV benadryl prior to Vanc.  Received tobramycin x 1 3/18.   CT sinuses 3/18 worsening paranasal sinus opacification.  Respiratory culture, viral pathogens panel, CMV PCR, aspergillus ag, and fungitell pending.  ID and ENT following, appreciate recs.         Hypertension    On Norvasc at  home.      BP wnl this morning, will resume home meds.        CF related Pancreatic insufficiency    On Creon with meals.    Continue with pancreatic enzymes with meals.  BG monitoring and low dose SSI ordered.  Endocrine following, appreciate recs.         Anxiety disorder    Continue home medications which include Abilify and Ativan.  Remeron nightly for sleep.        Chronic pain with opiate use    Will continue home medications which include Gabapentin 1100mg TID, MS Contin 15mg TID, Oxycodone 10mg prn q6 hours, Tizanidine 4mg.        Headache    Continue with Topamax.            Shama Canales PA-C  Lung Transplant  [facility]

## 2018-03-20 NOTE — PROGRESS NOTES
Lung transplant team called regarding post op orders and clarification on isolation status. Spoke with FELICIA Lux. New orders obtained (see orders) and isolation status contact for patient safety r/t CF. Contact isolation maintained per order. Will continue to monitor patient in stable condition

## 2018-03-20 NOTE — ASSESSMENT & PLAN NOTE
Received BLT on 6/12/2014 for cystic fibrosis.  Has known CARLOS EDUARDO grade 3.    Continue immunosuppressives and prophylactic antibiotics.

## 2018-03-20 NOTE — NURSING TRANSFER
Nursing Transfer Note      3/20/2018     Transfer [id number]    Transfer via stretcher    Transfer with o2 @ 2L    Transported by nurse    Medicines sent: na    Chart send with patient: yes    Patient reassessed at: 3/20 @ 0900

## 2018-03-20 NOTE — ASSESSMENT & PLAN NOTE
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 She is stable currently on empiric vanc/beatrice/tobra. Sinus CT shows Postoperative change of prior partial ethmoidectomies and bilateral uncinectomies with severe paranasal sinus opacification; but per patient report no evidence of ongoing active purulent sinus disease or concerns for fungal disease per ENT scope    - patient had bronchoscopy today (cell count 278, 29% N, 15%L, 56%M, gram stain negative)  - would continue current antibiotic therapy with vancomycin, meropenem  - follow work up that is pending

## 2018-03-20 NOTE — PROGRESS NOTES
[facility]  Otorhinolaryngology-Head & Neck Surgery  Progress Note    Subjective:     Post-Op Info:  Procedure(s) (LRB):  BRONCHOSCOPY, BAL, BIOPSIES (N/A)   Day of Surgery  Hospital Day: 3     Interval History: OBED. Went for bronchoscopy this morning and feels tired. Reports that sinuses are not giving her any trouble.     Medications:  Continuous Infusions:  Scheduled Meds:   amLODIPine  5 mg Oral Daily    ARIPiprazole  2 mg Oral QHS    calcium-vitamin D3  1 tablet Oral BID    dapsone  100 mg Oral Daily    docusate sodium  100 mg Oral BID    fluticasone  2 spray Each Nare Daily    folic acid  1 mg Oral Daily    gabapentin  1,100 mg Oral TID    lipase-protease-amylase 24,000-76,000-120,000 units  6 capsule Oral TID WM    magnesium oxide  400 mg Oral BID    meropenem (MERREM) IVPB  1 g Intravenous Q8H    mirtazapine  45 mg Oral QHS    montelukast  10 mg Oral Daily    morphine  15 mg Oral TID    multivitamin  1 tablet Oral Daily    pantoprazole  40 mg Oral Daily    predniSONE  5 mg Oral Daily    tacrolimus  3.5 mg Oral BID    topiramate  25 mg Oral BID    valGANciclovir  900 mg Oral BID    vancomycin (VANCOCIN) IVPB  1,000 mg Intravenous Q12H     PRN Meds:butalbital-acetaminophen-caffeine -40 mg, dextrose 50%, dextrose 50%, diphenhydrAMINE, glucagon (human recombinant), glucose, glucose, insulin aspart U-100, levalbuterol, LORazepam, ondansetron, oxyCODONE, promethazine, tiZANidine     Review of patient's allergies indicates:   Allergen Reactions    Albuterol Palpitations    Colistin Anaphylaxis    Vancomycin analogues      Infusion reaction that does not resolve with slowing    Neupogen [filgrastim] Other (See Comments)     Ostealgia after five daily doses of 300 mcg.      Bactrim [sulfamethoxazole-trimethoprim] Hives    Ceftazidime Hives     Pt stated can tolerate cefapine not ceftazidime    Ceftazidime     Dronabinol Other (See Comments)     Mental  changes/hallucinations    Haldol [haloperidol lactate] Other (See Comments)     Seizure like activity    Nsaids (non-steroidal anti-inflammatory drug)      Cannot have due to lung transplant    Adhesive Rash     Cloth tape- please use tegaderm or paper tape    Aztreonam Rash    Ciprofloxacin Nausea And Vomiting     Projectile N/V, per patient.  Unwilling to retry therapy.     Objective:     Vital Signs (24h Range):  Temp:  [97.8 °F (36.6 °C)-98.5 °F (36.9 °C)] 98.1 °F (36.7 °C)  Pulse:  [] 96  Resp:  [13-20] 18  SpO2:  [90 %-98 %] 94 %  BP: ()/(59-79) 119/66       Date 03/20/18 0700 - 03/21/18 0659   Shift 1756-9486 5384-0260 0753-6539 24 Hour Total   I  N  T  A  K  E   I.V.  (mL/kg) 400  (6.7)   400  (6.7)    Shift Total  (mL/kg) 400  (6.7)   400  (6.7)   O  U  T  P  U  T   Shift Total  (mL/kg)       Weight (kg) 59.9 59.9 59.9 59.9     Lines/Drains/Airways     Central Venous Catheter Line                 Port A Cath Single Lumen 08/02/16 0856 right subclavian 595 days                Physical Exam   NAD, alert, awake  EOMI  External nose normal without lesions  Oral cavity with moist mucous membranes, no lesions  Neck soft and supple, non-tender  Voice normal  Normal work of breathing.   PE tube lodged in R EAC - removed.     Rigid nasal endoscopy performed 3/19/2018:   Edematous sinus mucosa with crusting, bilateral patent ethmoid cavity and maxillary antrostomies.  No evidence of active infection, no bleeding or ulceration.    Significant Labs:  BMP:   Recent Labs  Lab 03/20/18  0500   GLU 87      CO2 26   BUN 16   CREATININE 1.0   CALCIUM 8.6*   MG 1.6     CBC:   Recent Labs  Lab 03/20/18  0500   WBC 4.94   RBC 3.00*   HGB 8.7*   HCT 28.2*   *   MCV 94   MCH 29.0   MCHC 30.9*       Significant Diagnostics:  CT: I have reviewed all pertinent results/findings within the past 24 hours and my personal findings are:  mucosal thickening in sinuses    Assessment/Plan:     Sinus infection     "Juanita Ibarra (Tori) is a patient well-known to the ENT service with a history of CF and lung transplant, sinusitis, and otitis media. Sinus exudates stable, minimal polyps or purulence on nasal endoscopy.     -Do not believe there is an active sinus infection currently  -Continue antibiotics per primary team pending cultures  -Culture swab from right middle meatus taken today - sent for anaerobic, aerobic, grams stain, and fungal culture. Will follow up   -plan to resume topical antibiotic rinses upon return home   -ENT will follow, please call with questions               Anuja Franklin MD  Otorhinolaryngology-Head & Neck Surgery  [facility]  "

## 2018-03-20 NOTE — ASSESSMENT & PLAN NOTE
Will continue home medications which include Gabapentin 1100mg TID, MS Contin 15mg TID, Oxycodone 10mg prn q6 hours, Tizanidine 4mg.

## 2018-03-20 NOTE — PROGRESS NOTES
Pt left unit via wheel chair to go down for bronch. Pt not in any distress. Vital signs taken before pt left. VSS.

## 2018-03-20 NOTE — SUBJECTIVE & OBJECTIVE
Interval History: OBED. Went for bronchoscopy this morning and feels tired. Reports that sinuses are not giving her any trouble.     Medications:  Continuous Infusions:  Scheduled Meds:   amLODIPine  5 mg Oral Daily    ARIPiprazole  2 mg Oral QHS    calcium-vitamin D3  1 tablet Oral BID    dapsone  100 mg Oral Daily    docusate sodium  100 mg Oral BID    fluticasone  2 spray Each Nare Daily    folic acid  1 mg Oral Daily    gabapentin  1,100 mg Oral TID    lipase-protease-amylase 24,000-76,000-120,000 units  6 capsule Oral TID WM    magnesium oxide  400 mg Oral BID    meropenem (MERREM) IVPB  1 g Intravenous Q8H    mirtazapine  45 mg Oral QHS    montelukast  10 mg Oral Daily    morphine  15 mg Oral TID    multivitamin  1 tablet Oral Daily    pantoprazole  40 mg Oral Daily    predniSONE  5 mg Oral Daily    tacrolimus  3.5 mg Oral BID    topiramate  25 mg Oral BID    valGANciclovir  900 mg Oral BID    vancomycin (VANCOCIN) IVPB  1,000 mg Intravenous Q12H     PRN Meds:butalbital-acetaminophen-caffeine -40 mg, dextrose 50%, dextrose 50%, diphenhydrAMINE, glucagon (human recombinant), glucose, glucose, insulin aspart U-100, levalbuterol, LORazepam, ondansetron, oxyCODONE, promethazine, tiZANidine     Review of patient's allergies indicates:   Allergen Reactions    Albuterol Palpitations    Colistin Anaphylaxis    Vancomycin analogues      Infusion reaction that does not resolve with slowing    Neupogen [filgrastim] Other (See Comments)     Ostealgia after five daily doses of 300 mcg.      Bactrim [sulfamethoxazole-trimethoprim] Hives    Ceftazidime Hives     Pt stated can tolerate cefapine not ceftazidime    Ceftazidime     Dronabinol Other (See Comments)     Mental changes/hallucinations    Haldol [haloperidol lactate] Other (See Comments)     Seizure like activity    Nsaids (non-steroidal anti-inflammatory drug)      Cannot have due to lung transplant    Adhesive Rash     Cloth  tape- please use tegaderm or paper tape    Aztreonam Rash    Ciprofloxacin Nausea And Vomiting     Projectile N/V, per patient.  Unwilling to retry therapy.     Objective:     Vital Signs (24h Range):  Temp:  [97.8 °F (36.6 °C)-98.5 °F (36.9 °C)] 98.1 °F (36.7 °C)  Pulse:  [] 96  Resp:  [13-20] 18  SpO2:  [90 %-98 %] 94 %  BP: ()/(59-79) 119/66       Date 03/20/18 0700 - 03/21/18 0659   Shift 2300-4519 3922-8857 0797-4925 24 Hour Total   I  N  T  A  K  E   I.V.  (mL/kg) 400  (6.7)   400  (6.7)    Shift Total  (mL/kg) 400  (6.7)   400  (6.7)   O  U  T  P  U  T   Shift Total  (mL/kg)       Weight (kg) 59.9 59.9 59.9 59.9     Lines/Drains/Airways     Central Venous Catheter Line                 Port A Cath Single Lumen 08/02/16 0856 right subclavian 595 days                Physical Exam   NAD, alert, awake  EOMI  External nose normal without lesions  Oral cavity with moist mucous membranes, no lesions  Neck soft and supple, non-tender  Voice normal  Normal work of breathing.   PE tube lodged in R EAC - removed.     Rigid nasal endoscopy performed 3/19/2018:   Edematous sinus mucosa with crusting, bilateral patent ethmoid cavity and maxillary antrostomies.  No evidence of active infection, no bleeding or ulceration.    Significant Labs:  BMP:   Recent Labs  Lab 03/20/18  0500   GLU 87      CO2 26   BUN 16   CREATININE 1.0   CALCIUM 8.6*   MG 1.6     CBC:   Recent Labs  Lab 03/20/18  0500   WBC 4.94   RBC 3.00*   HGB 8.7*   HCT 28.2*   *   MCV 94   MCH 29.0   MCHC 30.9*       Significant Diagnostics:  CT: I have reviewed all pertinent results/findings within the past 24 hours and my personal findings are:  mucosal thickening in sinuses

## 2018-03-20 NOTE — TRANSFER OF CARE
"Anesthesia Transfer of Care Note    Patient: Juanita Ibarra    Procedure(s) Performed: Procedure(s) (LRB):  BRONCHOSCOPY, BAL, BIOPSIES (N/A)    Patient location: PACU    Anesthesia Type: general    Transport from OR: Transported from OR on 6-10 L/min O2 by face mask with adequate spontaneous ventilation    Post pain: adequate analgesia    Post assessment: no apparent anesthetic complications and tolerated procedure well    Post vital signs: stable    Level of consciousness: sedated    Nausea/Vomiting: no nausea/vomiting    Complications: none    Transfer of care protocol was followed      Last vitals:   Visit Vitals  BP (!) 100/59   Pulse 89   Temp 36.7 °C (98 °F) (Temporal)   Resp 20   Ht 5' 1" (1.549 m)   Wt 59.9 kg (132 lb)   LMP 10/02/2016   SpO2 98%   Breastfeeding? No   BMI 24.94 kg/m²     "

## 2018-03-20 NOTE — ANESTHESIA POSTPROCEDURE EVALUATION
"Anesthesia Post Evaluation    Patient: Juanita Ibarra    Procedure(s) Performed: Procedure(s) (LRB):  BRONCHOSCOPY, BAL, BIOPSIES (N/A)    Final Anesthesia Type: general  Patient location during evaluation: PACU  Patient participation: Yes- Able to Participate  Level of consciousness: awake and alert  Post-procedure vital signs: reviewed and stable  Pain management: adequate  Airway patency: patent  PONV status at discharge: No PONV  Anesthetic complications: no      Cardiovascular status: blood pressure returned to baseline  Respiratory status: spontaneous ventilation and room air  Hydration status: euvolemic  Follow-up not needed.        Visit Vitals  /69   Pulse 80   Temp 36.7 °C (98.1 °F) (Temporal)   Resp 14   Ht 5' 1" (1.549 m)   Wt 59.9 kg (132 lb)   LMP 10/02/2016   SpO2 96%   Breastfeeding? No   BMI 24.94 kg/m²       Pain/Richard Score: Pain Assessment Performed: Yes (3/20/2018  8:00 AM)  Presence of Pain: non-verbal indicators absent (3/20/2018  8:00 AM)  Pain Rating Prior to Med Admin: 8 (3/20/2018  5:19 AM)  Pain Rating Post Med Admin: 8 (3/19/2018  9:37 PM)  Richard Score: 6 (3/20/2018  8:00 AM)      "

## 2018-03-20 NOTE — PLAN OF CARE
Problem: Patient Care Overview  Goal: Plan of Care Review  Outcome: Ongoing (interventions implemented as appropriate)  Pt AAOx4     VSS  Satting 91-92% on room air   HR 80's-1teens  BP low 100's/60's-7-'s  Afebrile    NPO> MN. Bronch today.  CMV PCR pending    R CW port Transparent dressing CDI. Due to be changed 3/20.  Accuchecks being monitored AC/HS. No coverage needed.    Contact PPE worn for protection of the CF pt.     Pt remained free from falls or injury thus far.   Bed is in low/ locked position, side rails are up x 2, call light is in reach.   Will continue to monitor.

## 2018-03-20 NOTE — ASSESSMENT & PLAN NOTE
Admitted in [place] on 3/6/2018 for acute sinusitis and was to complete 14 days of inpatient IV therapy.  Developed fevers up to 101.8F on 3/17/2018 and was transferred to [facility] for further management.      No culture data available from OSH.  Most recent cultures here have shown MRSA/Pseudomonas in 2017.  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    Will continue current IV therapy with Vanc/Merrem--pre medication with IV benadryl prior to Vanc.  Received tobramycin x 1 3/18.   CT sinuses 3/18 worsening paranasal sinus opacification.  Respiratory culture, viral pathogens panel, CMV PCR, aspergillus ag, and fungitell pending.  ID and ENT following, appreciate recs.

## 2018-03-20 NOTE — PROGRESS NOTES
New hospital policy is for all CF pts to be placed on contact isolation for protection of the pt. Will put in orders for contact isolation status and for isolation kit to be sent up from central supply.

## 2018-03-20 NOTE — SUBJECTIVE & OBJECTIVE
Interval History: afebrile, feeling fatigue    Review of Systems   Constitutional: Positive for fatigue. Negative for chills and fever.   HENT: Negative for sore throat.    Respiratory: Negative for cough, chest tightness and shortness of breath.    Cardiovascular: Negative for chest pain, palpitations and leg swelling.   Gastrointestinal: Negative for abdominal distention, abdominal pain, diarrhea, nausea and vomiting.   Genitourinary: Negative for dysuria, flank pain and urgency.   Skin: Negative for rash.   Neurological: Negative for dizziness, light-headedness and numbness.   Psychiatric/Behavioral: Negative for confusion.     Objective:     Vital Signs (Most Recent):  Temp: 97.9 °F (36.6 °C) (03/20/18 1609)  Pulse: (!) 111 (03/20/18 1609)  Resp: 20 (03/20/18 1609)  BP: 123/77 (03/20/18 1609)  SpO2: (!) 91 % (03/20/18 1609) Vital Signs (24h Range):  Temp:  [97.8 °F (36.6 °C)-98.4 °F (36.9 °C)] 97.9 °F (36.6 °C)  Pulse:  [] 111  Resp:  [13-20] 20  SpO2:  [90 %-98 %] 91 %  BP: (100-123)/(59-79) 123/77     Weight: 59.9 kg (132 lb)  Body mass index is 24.94 kg/m².    Estimated Creatinine Clearance: 68.9 mL/min (based on SCr of 1 mg/dL).    Physical Exam   Constitutional: She is oriented to person, place, and time. No distress.   HENT:   Head: Normocephalic.   Mouth/Throat: No oropharyngeal exudate.   Eyes: No scleral icterus.   Cardiovascular: Normal rate and regular rhythm.    No murmur heard.  Pulmonary/Chest: Effort normal. No respiratory distress. She has decreased breath sounds in the right lower field and the left lower field.   Abdominal: Soft. Bowel sounds are normal. She exhibits no distension. There is no tenderness. There is no rebound and no guarding.   Musculoskeletal: She exhibits no edema.   Lymphadenopathy:     She has no cervical adenopathy.   Neurological: She is alert and oriented to person, place, and time. No cranial nerve deficit.   Skin: She is not diaphoretic.   Vitals  reviewed.      Significant Labs:   Bilirubin:   Recent Labs  Lab 03/18/18 1918   BILITOT 0.5     Blood Culture: No results for input(s): LABBLOO in the last 4320 hours.  BMP:   Recent Labs  Lab 03/20/18  0500   GLU 87      K 4.2      CO2 26   BUN 16   CREATININE 1.0   CALCIUM 8.6*   MG 1.6     CBC:   Recent Labs  Lab 03/18/18 1918 03/19/18  0500 03/20/18  0500   WBC 7.67 6.71 4.94   HGB 8.9* 8.9* 8.7*   HCT 29.3* 29.6* 28.2*   * 132* 146*     CMP:   Recent Labs  Lab 03/18/18 1918 03/19/18  0500 03/20/18  0500     137 138 138   K 4.7  4.7 4.1 4.2     106 109 106   CO2 22*  22* 20* 26   *  136* 69* 87   BUN 14  14 13 16   CREATININE 0.9  0.9 0.9 1.0   CALCIUM 9.0  9.0 8.1* 8.6*   PROT 6.8  --   --    ALBUMIN 3.3*  --   --    BILITOT 0.5  --   --    ALKPHOS 132  --   --    AST 37  --   --    ALT 28  --   --    ANIONGAP 9  9 9 6*   EGFRNONAA >60.0  >60.0 >60.0 >60.0     Microbiology Results (last 7 days)     Procedure Component Value Units Date/Time    Culture, Respiratory [[id number]] Collected:  03/20/18 0742    Order Status:  Completed Specimen:  Respiratory from BAL, RML Updated:  03/20/18 1520     Gram Stain (Respiratory) No WBC's     Gram Stain (Respiratory) No organisms seen    Narrative:       Bronchial Wash    Gram stain [[id number]] Collected:  03/20/18 1213    Order Status:  Completed Specimen:  Respiratory from Nares, Right Updated:  03/20/18 1516     Gram Stain Result Rare WBC's      Rare yeast    Narrative:       Right middle meatus    Fungus culture [[id number]] Collected:  03/20/18 1213    Order Status:  Sent Specimen:  Respiratory from Nares, Right Updated:  03/20/18 1232    Aerobic culture [[id number]] Collected:  03/20/18 1213    Order Status:  Sent Specimen:  Respiratory from Nares, Right Updated:  03/20/18 1231    Culture, Anaerobe [[id number]] Collected:  03/20/18 1213    Order Status:  Sent Specimen:  Respiratory from Nares, Right Updated:  03/20/18  1231    Cryptococcal antigen [[id number]] Collected:  03/20/18 1027    Order Status:  Sent Specimen:  Blood from Blood Updated:  03/20/18 1027    Fungus culture [[id number]] Collected:  03/20/18 0742    Order Status:  Sent Specimen:  Respiratory from BAL, RML Updated:  03/20/18 0837    AFB Culture & Smear [[id number]] Collected:  03/20/18 0742    Order Status:  Sent Specimen:  Respiratory from BAL, RML Updated:  03/20/18 0837    Culture, Respiratory with Gram Stain [[id number]] Collected:  03/20/18 0742    Order Status:  Canceled Specimen:  Respiratory from Sputum, Expectorated Updated:  03/20/18 0836          Significant Imaging: I have reviewed all pertinent imaging results/findings within the past 24 hours.

## 2018-03-20 NOTE — ASSESSMENT & PLAN NOTE
"Juanita Ibarra (Tori) is a patient well-known to the ENT service with a history of CF and lung transplant, sinusitis, and otitis media. Sinus exudates stable, minimal polyps or purulence on nasal endoscopy.     -Do not believe there is an active sinus infection currently  -Continue antibiotics per primary team pending cultures  -Culture swab from right middle meatus taken today - sent for anaerobic, aerobic, grams stain, and fungal culture. Will follow up   -plan to resume topical antibiotic rinses upon return home   -ENT will follow, please call with questions     "

## 2018-03-20 NOTE — PROGRESS NOTES
Dr. Raya,Critical MD covering for lung transplant, contacted regarding pt request for something for pain.    Pt c/o 8/10 pain to back and sternum.   Pt about to receive morphine 12 hr, but states that this does nothing for immediate pain.   Pt has order for 10 mg Oxycodone Q 6 h but just received dose at 17:15.     MD looked over pt chart and stated that he would put in order for a 1 time dose of 5 mg Oxycodone to get pt through until next available PRN dose of 10 mg Oxycodone.     Pt informed of MD order. Pt stated that she has a tolerance for certain medications and that 5 mg of Oxycodone would not do anything, but agreed to take the additional dose anyway.     Will administer and continue to monitor.

## 2018-03-20 NOTE — ASSESSMENT & PLAN NOTE
On Tacrolimus 3.5mg BID, MMF 500mg BID, and Prednisone 5mg as an outpatient.      Will continue Tacrolimus at 3.5mg BID and Prednisone 5mg for now.  Continue to hold MMF in the setting of acute infection.  Will follow daily Tacrolimus levels and adjust as needed, today's level is pending.

## 2018-03-20 NOTE — PROGRESS NOTES
[facility]  Infectious Disease  Progress Note    Patient Name: Juanita Ibarra  MRN: [id number]  Admission Date: 3/18/2018  Length of Stay: 2 days  Attending Physician: Jake Alvarez MD  Primary Care Provider: Jake Alvarez MD    Isolation Status: Contact  Assessment/Plan:      * Hospital acquired PNA    27yo woman w/a history of CF (c/b pancreatic insufficiency, sinus disease, MRSA/Pseudomonas colonization c/b numerous antibiotics allergies, and subsequent ESLD; s/p BOLT 6/12/2014, CMV D+/R-, simulect induction, on maintenance tacro/MMF/pred; c/b multiple episodes of MRSA/Pseudomonas pneumonia from [id number], 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 She is stable currently on empiric vanc/beatrice/tobra. Sinus CT shows Postoperative change of prior partial ethmoidectomies and bilateral uncinectomies with severe paranasal sinus opacification; but per patient report no evidence of ongoing active purulent sinus disease or concerns for fungal disease per ENT scope    - patient had bronchoscopy today (cell count 278, 29% N, 15%L, 56%M, gram stain negative)  - would continue current antibiotic therapy with vancomycin, meropenem  - follow work up that is pending            Anticipated Disposition: pending    Thank you for your consult. I will follow-up with patient. Please contact us if you have any additional questions.    Jose Roberto Mercado MD  Infectious Disease Fellow, PGY-5  Spectra: [phone]  Pager: [phone]  [facility]    Subjective:     Principal Problem:Hospital acquired PNA    HPI: Ms. 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 She was first seen by her PCP with sinus congestion she attributed to seasonal allergies at the beginning of 3/2018 but was then admitted to an OSH in [place] on 3/6/2018 when she developed purulent sinus congestion, associated CARROLL, and productive cough. She was treated with an empiric vanc/meropenem course for pansinusitis with clinical improvement until HD #11 when she developed F/C/S (Tm 101.8F), worsening non-productive cough, and SOB with an increasing oxygen requirement. Tobramycin was added to her current regimen prior to transfer here to [facility] with improvement in her fever curve but not other symptoms to date. She feels that her symptoms somewhat resemble prior episodes of CMV reactivations she has had. She has been afebrile in house without a leukocytosis. Procalcitonin is not elevated and CT sinuses reveals pansinusitis with some concern of fungal colonization in some areas radiographically. No sinus cultures were obtained at the OSH facility and ENT evaluation here with endoscopy was unremarkable per patient report. Bronchoscopy is planned for tomorrow.  Interval History: afebrile, feeling fatigue    Review of Systems   Constitutional: Positive for fatigue. Negative for chills and fever.   HENT: Negative for sore throat.    Respiratory: Negative for cough, chest tightness and shortness of breath.    Cardiovascular: Negative for chest pain, palpitations and leg swelling.   Gastrointestinal: Negative  for abdominal distention, abdominal pain, diarrhea, nausea and vomiting.   Genitourinary: Negative for dysuria, flank pain and urgency.   Skin: Negative for rash.   Neurological: Negative for dizziness, light-headedness and numbness.   Psychiatric/Behavioral: Negative for confusion.     Objective:     Vital Signs (Most Recent):  Temp: 97.9 °F (36.6 °C) (03/20/18 1609)  Pulse: (!) 111 (03/20/18 1609)  Resp: 20 (03/20/18 1609)  BP: 123/77 (03/20/18 1609)  SpO2: (!) 91 % (03/20/18 1609) Vital Signs (24h Range):  Temp:  [97.8 °F (36.6 °C)-98.4 °F (36.9 °C)] 97.9 °F (36.6 °C)  Pulse:  [] 111  Resp:  [13-20] 20  SpO2:  [90 %-98 %] 91 %  BP: (100-123)/(59-79) 123/77     Weight: 59.9 kg (132 lb)  Body mass index is 24.94 kg/m².    Estimated Creatinine Clearance: 68.9 mL/min (based on SCr of 1 mg/dL).    Physical Exam   Constitutional: She is oriented to person, place, and time. No distress.   HENT:   Head: Normocephalic.   Mouth/Throat: No oropharyngeal exudate.   Eyes: No scleral icterus.   Cardiovascular: Normal rate and regular rhythm.    No murmur heard.  Pulmonary/Chest: Effort normal. No respiratory distress. She has decreased breath sounds in the right lower field and the left lower field.   Abdominal: Soft. Bowel sounds are normal. She exhibits no distension. There is no tenderness. There is no rebound and no guarding.   Musculoskeletal: She exhibits no edema.   Lymphadenopathy:     She has no cervical adenopathy.   Neurological: She is alert and oriented to person, place, and time. No cranial nerve deficit.   Skin: She is not diaphoretic.   Vitals reviewed.      Significant Labs:   Bilirubin:   Recent Labs  Lab 03/18/18  1918   BILITOT 0.5     Blood Culture: No results for input(s): LABBLOO in the last 4320 hours.  BMP:   Recent Labs  Lab 03/20/18  0500   GLU 87      K 4.2      CO2 26   BUN 16   CREATININE 1.0   CALCIUM 8.6*   MG 1.6     CBC:   Recent Labs  Lab 03/18/18  1918 03/19/18  0500  03/20/18  0500   WBC 7.67 6.71 4.94   HGB 8.9* 8.9* 8.7*   HCT 29.3* 29.6* 28.2*   * 132* 146*     CMP:   Recent Labs  Lab 03/18/18  1918 03/19/18  0500 03/20/18  0500     137 138 138   K 4.7  4.7 4.1 4.2     106 109 106   CO2 22*  22* 20* 26   *  136* 69* 87   BUN 14  14 13 16   CREATININE 0.9  0.9 0.9 1.0   CALCIUM 9.0  9.0 8.1* 8.6*   PROT 6.8  --   --    ALBUMIN 3.3*  --   --    BILITOT 0.5  --   --    ALKPHOS 132  --   --    AST 37  --   --    ALT 28  --   --    ANIONGAP 9  9 9 6*   EGFRNONAA >60.0  >60.0 >60.0 >60.0     Microbiology Results (last 7 days)     Procedure Component Value Units Date/Time    Culture, Respiratory [[id number]] Collected:  03/20/18 0742    Order Status:  Completed Specimen:  Respiratory from BAL, RML Updated:  03/20/18 1520     Gram Stain (Respiratory) No WBC's     Gram Stain (Respiratory) No organisms seen    Narrative:       Bronchial Wash    Gram stain [[id number]] Collected:  03/20/18 1213    Order Status:  Completed Specimen:  Respiratory from Nares, Right Updated:  03/20/18 1516     Gram Stain Result Rare WBC's      Rare yeast    Narrative:       Right middle meatus    Fungus culture [[id number]] Collected:  03/20/18 1213    Order Status:  Sent Specimen:  Respiratory from Nares, Right Updated:  03/20/18 1232    Aerobic culture [[id number]] Collected:  03/20/18 1213    Order Status:  Sent Specimen:  Respiratory from Nares, Right Updated:  03/20/18 1231    Culture, Anaerobe [[id number]] Collected:  03/20/18 1213    Order Status:  Sent Specimen:  Respiratory from Nares, Right Updated:  03/20/18 1231    Cryptococcal antigen [[id number]] Collected:  03/20/18 1027    Order Status:  Sent Specimen:  Blood from Blood Updated:  03/20/18 1027    Fungus culture [[id number]] Collected:  03/20/18 0742    Order Status:  Sent Specimen:  Respiratory from BAL, RML Updated:  03/20/18 0837    AFB Culture & Smear [[id number]] Collected:  03/20/18 0742    Order Status:   Sent Specimen:  Respiratory from BAL, RML Updated:  03/20/18 0837    Culture, Respiratory with Gram Stain [[id number]] Collected:  03/20/18 0742    Order Status:  Canceled Specimen:  Respiratory from Sputum, Expectorated Updated:  03/20/18 0836          Significant Imaging: I have reviewed all pertinent imaging results/findings within the past 24 hours.

## 2018-03-20 NOTE — ASSESSMENT & PLAN NOTE
Pa/lateral CXR from admission with RLL infiltrate, +crackles RLL on exam, Procalcitonin 0.16.   WBC - 4.94 today, remains afebrile, VSS, was placed on Merropenem/Vanc whilein [place] on 3/6 admission, received tobramycin x 1 3/18.     OR bronch from this morning revealed stenotic surgical anastomosis with minor airway obstruction, as well as copious, tan/brown, thick secretions throughout the tracheobronchial tree. BAL results pending.  Continue Merropenem/Vanc, ID following, appreciate recs.   Continue daily labs.

## 2018-03-20 NOTE — SUBJECTIVE & OBJECTIVE
Subjective:     Interval History: No acute events overnight. Patient placed on Meropenem/Vanc since admission to OSH on 3/6, received tobramycin x 1 3/17 with some improvement of congestion and post-nasal drip. Underwent bronchoscopy in OR this AM without complications, BAL results pending. Currently 92% on room air.     Continuous Infusions:  Scheduled Meds:   ARIPiprazole  2 mg Oral QHS    calcium-vitamin D3  1 tablet Oral BID    dapsone  100 mg Oral Daily    docusate sodium  100 mg Oral BID    fluticasone  2 spray Each Nare Daily    folic acid  1 mg Oral Daily    gabapentin  1,100 mg Oral TID    lipase-protease-amylase 24,000-76,000-120,000 units  6 capsule Oral TID WM    magnesium oxide  400 mg Oral BID    meropenem (MERREM) IVPB  1 g Intravenous Q8H    mirtazapine  45 mg Oral QHS    montelukast  10 mg Oral Daily    morphine  15 mg Oral TID    multivitamin  1 tablet Oral Daily    pantoprazole  40 mg Oral Daily    predniSONE  5 mg Oral Daily    tacrolimus  3.5 mg Oral BID    topiramate  25 mg Oral BID    valGANciclovir  900 mg Oral BID    vancomycin (VANCOCIN) IVPB  1,000 mg Intravenous Q12H     PRN Meds:butalbital-acetaminophen-caffeine -40 mg, dextrose 50%, dextrose 50%, diphenhydrAMINE, glucagon (human recombinant), glucose, glucose, insulin aspart U-100, levalbuterol, LORazepam, ondansetron, oxyCODONE, promethazine, tiZANidine    Review of patient's allergies indicates:   Allergen Reactions    Albuterol Palpitations    Colistin Anaphylaxis    Vancomycin analogues      Infusion reaction that does not resolve with slowing    Neupogen [filgrastim] Other (See Comments)     Ostealgia after five daily doses of 300 mcg.      Bactrim [sulfamethoxazole-trimethoprim] Hives    Ceftazidime Hives     Pt stated can tolerate cefapine not ceftazidime    Ceftazidime     Dronabinol Other (See Comments)     Mental changes/hallucinations    Haldol [haloperidol lactate] Other (See Comments)      Seizure like activity    Nsaids (non-steroidal anti-inflammatory drug)      Cannot have due to lung transplant    Adhesive Rash     Cloth tape- please use tegaderm or paper tape    Aztreonam Rash    Ciprofloxacin Nausea And Vomiting     Projectile N/V, per patient.  Unwilling to retry therapy.       Review of Systems   Constitutional: Positive for chills, fatigue and fever. Negative for activity change, appetite change and diaphoresis.   HENT: Positive for congestion and postnasal drip. Negative for dental problem, drooling, ear discharge, ear pain, facial swelling, hearing loss, mouth sores, nosebleeds, rhinorrhea, sinus pain, sinus pressure, sneezing, sore throat and voice change.    Eyes: Negative for photophobia, pain, redness, itching and visual disturbance.   Respiratory: Positive for shortness of breath. Negative for cough, choking, chest tightness, wheezing and stridor.    Cardiovascular: Negative for chest pain, palpitations and leg swelling.   Gastrointestinal: Negative for abdominal distention, abdominal pain, constipation, diarrhea, nausea and vomiting.   Endocrine: Negative for cold intolerance, heat intolerance, polydipsia, polyphagia and polyuria.   Genitourinary: Negative for difficulty urinating, dysuria, flank pain, frequency and urgency.   Musculoskeletal: Negative for arthralgias, back pain, gait problem, myalgias, neck pain and neck stiffness.   Skin: Negative for color change and rash.   Allergic/Immunologic: Positive for immunocompromised state.   Neurological: Negative for dizziness, tremors, seizures, syncope, weakness, light-headedness, numbness and headaches.   Hematological: Negative for adenopathy.   Psychiatric/Behavioral: Negative for agitation and confusion.     Objective:   Physical Exam   Constitutional: She is oriented to person, place, and time. She appears well-developed and well-nourished. No distress.   HENT:   Head: Normocephalic and atraumatic.   Right Ear: External ear  normal.   Left Ear: External ear normal.   Nose: Nose normal.   Mouth/Throat: Oropharynx is clear and moist. No oropharyngeal exudate.   Eyes: Conjunctivae and EOM are normal. Pupils are equal, round, and reactive to light. Right eye exhibits no discharge. Left eye exhibits no discharge. No scleral icterus.   Neck: Normal range of motion. Neck supple. No JVD present. No tracheal deviation present. No thyromegaly present.   Cardiovascular: Normal rate, regular rhythm, normal heart sounds and intact distal pulses.  Exam reveals no gallop and no friction rub.    No murmur heard.  Pulmonary/Chest: Effort normal. No accessory muscle usage. No tachypnea. No respiratory distress. She has no wheezes. She has no rhonchi. She has rales in the right lower field. She exhibits no tenderness.   Abdominal: Soft. Bowel sounds are normal. She exhibits no distension. There is no tenderness. There is no rebound and no guarding.   Musculoskeletal: Normal range of motion. She exhibits no edema, tenderness or deformity.   Lymphadenopathy:     She has no cervical adenopathy.   Neurological: She is alert and oriented to person, place, and time. No cranial nerve deficit.   Skin: Skin is warm and dry. No rash noted. She is not diaphoretic. No erythema. No pallor.   Psychiatric: She has a normal mood and affect. Judgment normal.   Nursing note and vitals reviewed.        Vital Signs (Most Recent):  Temp: 98.1 °F (36.7 °C) (03/20/18 0900)  Pulse: 85 (03/20/18 0945)  Resp: 13 (03/20/18 0900)  BP: 118/79 (03/20/18 0945)  SpO2: (!) 92 % (03/20/18 0945) Vital Signs (24h Range):  Temp:  [97.8 °F (36.6 °C)-98.5 °F (36.9 °C)] 98.1 °F (36.7 °C)  Pulse:  [] 85  Resp:  [13-20] 13  SpO2:  [90 %-98 %] 92 %  BP: ()/(59-79) 118/79     Weight: 59.9 kg (132 lb)  Body mass index is 24.94 kg/m².      Intake/Output Summary (Last 24 hours) at 03/20/18 1031  Last data filed at 03/20/18 0753   Gross per 24 hour   Intake             1290 ml   Output               350 ml   Net              940 ml       Significant Labs:  CBC:    Recent Labs  Lab 03/20/18  0500   WBC 4.94   RBC 3.00*   HGB 8.7*   HCT 28.2*   *   MCV 94   MCH 29.0   MCHC 30.9*     BMP:    Recent Labs  Lab 03/20/18  0500      K 4.2      CO2 26   BUN 16   CREATININE 1.0   CALCIUM 8.6*      Tacrolimus Levels:    Recent Labs  Lab 03/19/18  0500   TACROLIMUS 11.3     Microbiology:  Microbiology Results (last 7 days)     Procedure Component Value Units Date/Time    Cryptococcal antigen [[id number]] Collected:  03/20/18 1027    Order Status:  Sent Specimen:  Blood from Blood Updated:  03/20/18 1027    Fungus culture [[id number]] Collected:  03/20/18 0742    Order Status:  Sent Specimen:  Respiratory from BAL, RML Updated:  03/20/18 0837    AFB Culture & Smear [[id number]] Collected:  03/20/18 0742    Order Status:  Sent Specimen:  Respiratory from BAL, RML Updated:  03/20/18 0837    Culture, Respiratory [[id number]] Collected:  03/20/18 0742    Order Status:  Sent Specimen:  Respiratory from BAL, RML Updated:  03/20/18 0836    Culture, Respiratory with Gram Stain [[id number]] Collected:  03/20/18 0742    Order Status:  Sent Specimen:  Respiratory from Sputum, Expectorated Updated:  03/20/18 0836          I have reviewed all pertinent labs within the past 24 hours.    Diagnostic Results:  Labs: Reviewed  X-Ray: Reviewed

## 2018-03-20 NOTE — ASSESSMENT & PLAN NOTE
On Creon with meals.    Continue with pancreatic enzymes with meals.  BG monitoring and low dose SSI ordered.  Endocrine following, appreciate recs.

## 2018-03-20 NOTE — ANESTHESIA RELEASE NOTE
Post Anesthetic Evaluation    Patient: Juanita Ibarra    Procedure(s) Performed: Procedure(s) (LRB):  BRONCHOSCOPY, BAL, BIOPSIES (N/A)    Anesthesia type: MAC&GA    Patient location: PACU    Post pain: Adequate analgesia    Post assessment: no apparent anesthetic complications    Last Vitals:   Vitals:    03/20/18 0845   BP: 106/69   Pulse: 80   Resp: 14   Temp: 36.7 °C (98.1 °F)       Post vital signs: stable    Level of consciousness: awake    Complications: none    Follow-up Needed: No

## 2018-03-21 LAB
1,3 BETA GLUCAN SPEC-MCNC: 42 PG/ML
ANION GAP SERPL CALC-SCNC: 9 MMOL/L
BASOPHILS # BLD AUTO: 0.02 K/UL
BASOPHILS NFR BLD: 0.3 %
BUN SERPL-MCNC: 16 MG/DL
CALCIUM SERPL-MCNC: 9.2 MG/DL
CHLORIDE SERPL-SCNC: 105 MMOL/L
CMV DNA SPEC QL NAA+PROBE: NOT DETECTED
CO2 SERPL-SCNC: 28 MMOL/L
CREAT SERPL-MCNC: 1.1 MG/DL
CRYPTOC AG SER QL LA: NEGATIVE
DIFFERENTIAL METHOD: ABNORMAL
EOSINOPHIL # BLD AUTO: 0.1 K/UL
EOSINOPHIL NFR BLD: 2.2 %
ERYTHROCYTE [DISTWIDTH] IN BLOOD BY AUTOMATED COUNT: 13.8 %
EST. GFR  (AFRICAN AMERICAN): >60 ML/MIN/1.73 M^2
EST. GFR  (NON AFRICAN AMERICAN): >60 ML/MIN/1.73 M^2
GALACTOMANNAN AG SPEC-ACNC: <0.5 INDEX
GLUCOSE SERPL-MCNC: 112 MG/DL
HCT VFR BLD AUTO: 30.8 %
HGB BLD-MCNC: 9.2 G/DL
IMM GRANULOCYTES # BLD AUTO: 0.02 K/UL
IMM GRANULOCYTES NFR BLD AUTO: 0.3 %
LYMPHOCYTES # BLD AUTO: 2.2 K/UL
LYMPHOCYTES NFR BLD: 34.9 %
MAGNESIUM SERPL-MCNC: 1.7 MG/DL
MCH RBC QN AUTO: 28.2 PG
MCHC RBC AUTO-ENTMCNC: 29.9 G/DL
MCV RBC AUTO: 95 FL
MONOCYTES # BLD AUTO: 0.5 K/UL
MONOCYTES NFR BLD: 8.2 %
NEUTROPHILS # BLD AUTO: 3.4 K/UL
NEUTROPHILS NFR BLD: 54.1 %
NRBC BLD-RTO: 0 /100 WBC
PLATELET # BLD AUTO: 205 K/UL
PMV BLD AUTO: 10.3 FL
POTASSIUM SERPL-SCNC: 4.2 MMOL/L
RBC # BLD AUTO: 3.26 M/UL
SODIUM SERPL-SCNC: 142 MMOL/L
SPECIMEN SOURCE: NORMAL
TACROLIMUS BLD-MCNC: 16.2 NG/ML
WBC # BLD AUTO: 6.33 K/UL

## 2018-03-21 PROCEDURE — 83735 ASSAY OF MAGNESIUM: CPT

## 2018-03-21 PROCEDURE — 80048 BASIC METABOLIC PNL TOTAL CA: CPT

## 2018-03-21 PROCEDURE — 20600001 HC STEP DOWN PRIVATE ROOM

## 2018-03-21 PROCEDURE — 63600175 PHARM REV CODE 636 W HCPCS: Performed by: INTERNAL MEDICINE

## 2018-03-21 PROCEDURE — 25000003 PHARM REV CODE 250: Performed by: PHYSICIAN ASSISTANT

## 2018-03-21 PROCEDURE — 99232 SBSQ HOSP IP/OBS MODERATE 35: CPT | Mod: ,,, | Performed by: INTERNAL MEDICINE

## 2018-03-21 PROCEDURE — 25000003 PHARM REV CODE 250: Performed by: INTERNAL MEDICINE

## 2018-03-21 PROCEDURE — 85025 COMPLETE CBC W/AUTO DIFF WBC: CPT

## 2018-03-21 PROCEDURE — 80197 ASSAY OF TACROLIMUS: CPT

## 2018-03-21 RX ORDER — FLUCONAZOLE 150 MG/1
150 TABLET ORAL ONCE
Status: COMPLETED | OUTPATIENT
Start: 2018-03-21 | End: 2018-03-21

## 2018-03-21 RX ADMIN — MAGNESIUM OXIDE TAB 400 MG (241.3 MG ELEMENTAL MG) 400 MG: 400 (241.3 MG) TAB at 08:03

## 2018-03-21 RX ADMIN — ARIPIPRAZOLE 2 MG: 2 TABLET ORAL at 08:03

## 2018-03-21 RX ADMIN — MEROPENEM 1 G: 1 INJECTION, POWDER, FOR SOLUTION INTRAVENOUS at 08:03

## 2018-03-21 RX ADMIN — DIPHENHYDRAMINE HYDROCHLORIDE 50 MG: 50 INJECTION, SOLUTION INTRAMUSCULAR; INTRAVENOUS at 11:03

## 2018-03-21 RX ADMIN — OXYCODONE HYDROCHLORIDE 10 MG: 5 TABLET ORAL at 11:03

## 2018-03-21 RX ADMIN — VALGANCICLOVIR 900 MG: 450 TABLET, FILM COATED ORAL at 08:03

## 2018-03-21 RX ADMIN — SODIUM CHLORIDE 750 MG: 9 INJECTION, SOLUTION INTRAVENOUS at 04:03

## 2018-03-21 RX ADMIN — MEROPENEM 1 G: 1 INJECTION, POWDER, FOR SOLUTION INTRAVENOUS at 02:03

## 2018-03-21 RX ADMIN — TOPIRAMATE 25 MG: 25 TABLET, FILM COATED ORAL at 08:03

## 2018-03-21 RX ADMIN — MORPHINE SULFATE 15 MG: 15 TABLET, EXTENDED RELEASE ORAL at 08:03

## 2018-03-21 RX ADMIN — MAGNESIUM OXIDE TAB 400 MG (241.3 MG ELEMENTAL MG) 400 MG: 400 (241.3 MG) TAB at 11:03

## 2018-03-21 RX ADMIN — OYSTER SHELL CALCIUM WITH VITAMIN D 1 TABLET: 500; 200 TABLET, FILM COATED ORAL at 11:03

## 2018-03-21 RX ADMIN — TIZANIDINE 4 MG: 4 TABLET ORAL at 04:03

## 2018-03-21 RX ADMIN — PREDNISONE 5 MG: 5 TABLET ORAL at 08:03

## 2018-03-21 RX ADMIN — PANCRELIPASE 6 CAPSULE: 24000; 76000; 120000 CAPSULE, DELAYED RELEASE PELLETS ORAL at 11:03

## 2018-03-21 RX ADMIN — GABAPENTIN 1100 MG: 400 CAPSULE ORAL at 02:03

## 2018-03-21 RX ADMIN — OYSTER SHELL CALCIUM WITH VITAMIN D 1 TABLET: 500; 200 TABLET, FILM COATED ORAL at 08:03

## 2018-03-21 RX ADMIN — GABAPENTIN 1100 MG: 400 CAPSULE ORAL at 08:03

## 2018-03-21 RX ADMIN — LORAZEPAM 1 MG: 1 TABLET ORAL at 10:03

## 2018-03-21 RX ADMIN — AMLODIPINE BESYLATE 5 MG: 5 TABLET ORAL at 08:03

## 2018-03-21 RX ADMIN — MORPHINE SULFATE 15 MG: 15 TABLET, EXTENDED RELEASE ORAL at 02:03

## 2018-03-21 RX ADMIN — DAPSONE 100 MG: 100 TABLET ORAL at 08:03

## 2018-03-21 RX ADMIN — TACROLIMUS 3.5 MG: 1 CAPSULE ORAL at 08:03

## 2018-03-21 RX ADMIN — FLUTICASONE PROPIONATE 100 MCG: 50 SPRAY, METERED NASAL at 09:03

## 2018-03-21 RX ADMIN — FLUCONAZOLE 150 MG: 150 TABLET ORAL at 02:03

## 2018-03-21 RX ADMIN — OXYCODONE HYDROCHLORIDE 10 MG: 5 TABLET ORAL at 04:03

## 2018-03-21 RX ADMIN — MEROPENEM 1 G: 1 INJECTION, POWDER, FOR SOLUTION INTRAVENOUS at 05:03

## 2018-03-21 RX ADMIN — FOLIC ACID 1 MG: 1 TABLET ORAL at 11:03

## 2018-03-21 RX ADMIN — DIPHENHYDRAMINE HYDROCHLORIDE 50 MG: 50 INJECTION, SOLUTION INTRAMUSCULAR; INTRAVENOUS at 04:03

## 2018-03-21 RX ADMIN — LORAZEPAM 1 MG: 1 TABLET ORAL at 02:03

## 2018-03-21 RX ADMIN — DIPHENHYDRAMINE HYDROCHLORIDE 50 MG: 50 INJECTION, SOLUTION INTRAMUSCULAR; INTRAVENOUS at 05:03

## 2018-03-21 RX ADMIN — DIPHENHYDRAMINE HYDROCHLORIDE 50 MG: 50 INJECTION, SOLUTION INTRAMUSCULAR; INTRAVENOUS at 10:03

## 2018-03-21 RX ADMIN — OXYCODONE HYDROCHLORIDE 10 MG: 5 TABLET ORAL at 05:03

## 2018-03-21 RX ADMIN — MIRTAZAPINE 45 MG: 15 TABLET, FILM COATED ORAL at 08:03

## 2018-03-21 RX ADMIN — MONTELUKAST SODIUM 10 MG: 10 TABLET, FILM COATED ORAL at 08:03

## 2018-03-21 RX ADMIN — SODIUM CHLORIDE 500 ML: 9 INJECTION, SOLUTION INTRAVENOUS at 04:03

## 2018-03-21 NOTE — ASSESSMENT & PLAN NOTE
On Tacrolimus 3.5mg BID, MMF 500mg BID, and Prednisone 5mg as an outpatient.      Will continue Prednisone 5mg.  Continue to hold MMF in the setting of acute infection.    Hold tacrolimus today due to elevated level.   Will follow daily Tacrolimus levels and adjust as needed, today's level is 16.2.

## 2018-03-21 NOTE — PROGRESS NOTES
[facility]  Lung Transplant  Progress Note - Floor    Patient Name: Juanita Ibarra  MRN: [id number]  Admission Date: 3/18/2018  Hospital Length of Stay: 3 days  Post-Operative Day: 1378  Attending Physician: Jake Alvarez MD  Primary Care Provider: Jake Alvarez MD     Subjective:     Interval History: No acute events overnight. Remains afebrile on Meropenem/Vanc. BAL results from 3/20 bronch pending. Congestion and postnasal drip without change. Currently 93% on room air. No other complaints at this time.     Continuous Infusions:  Scheduled Meds:   amLODIPine  5 mg Oral Daily    ARIPiprazole  2 mg Oral QHS    calcium-vitamin D3  1 tablet Oral BID    dapsone  100 mg Oral Daily    docusate sodium  100 mg Oral BID    fluconazole  150 mg Oral Once    fluticasone  2 spray Each Nare Daily    folic acid  1 mg Oral Daily    gabapentin  1,100 mg Oral TID    lipase-protease-amylase 24,000-76,000-120,000 units  6 capsule Oral TID WM    magnesium oxide  400 mg Oral BID    meropenem (MERREM) IVPB  1 g Intravenous Q8H    mirtazapine  45 mg Oral QHS    montelukast  10 mg Oral Daily    morphine  15 mg Oral TID    multivitamin  1 tablet Oral Daily    pantoprazole  40 mg Oral Daily    predniSONE  5 mg Oral Daily    tacrolimus  3.5 mg Oral BID    topiramate  25 mg Oral BID    valGANciclovir  900 mg Oral BID    vancomycin (VANCOCIN) IVPB  750 mg Intravenous Q12H     PRN Meds:butalbital-acetaminophen-caffeine -40 mg, diphenhydrAMINE, heparin, porcine (PF), heparin, porcine (PF), levalbuterol, LORazepam, ondansetron, oxyCODONE, promethazine, tiZANidine    Review of patient's allergies indicates:   Allergen Reactions    Albuterol Palpitations    Colistin Anaphylaxis    Vancomycin analogues      Infusion reaction that does not resolve with slowing    Neupogen [filgrastim] Other (See Comments)     Ostealgia after five daily doses of 300 mcg.      Bactrim  [sulfamethoxazole-trimethoprim] Hives    Ceftazidime Hives     Pt stated can tolerate cefapine not ceftazidime    Ceftazidime     Dronabinol Other (See Comments)     Mental changes/hallucinations    Haldol [haloperidol lactate] Other (See Comments)     Seizure like activity    Nsaids (non-steroidal anti-inflammatory drug)      Cannot have due to lung transplant    Adhesive Rash     Cloth tape- please use tegaderm or paper tape    Aztreonam Rash    Ciprofloxacin Nausea And Vomiting     Projectile N/V, per patient.  Unwilling to retry therapy.       Review of Systems   Constitutional: Positive for chills and fatigue. Negative for activity change, appetite change, diaphoresis and fever.   HENT: Positive for congestion and postnasal drip. Negative for dental problem, drooling, ear discharge, ear pain, facial swelling, hearing loss, mouth sores, nosebleeds, rhinorrhea, sinus pain, sinus pressure, sneezing, sore throat and voice change.    Eyes: Negative for photophobia, pain, redness, itching and visual disturbance.   Respiratory: Negative for cough, choking, chest tightness, shortness of breath, wheezing and stridor.    Cardiovascular: Negative for chest pain, palpitations and leg swelling.   Gastrointestinal: Negative for abdominal distention, abdominal pain, constipation, diarrhea, nausea and vomiting.   Endocrine: Negative for cold intolerance, heat intolerance, polydipsia, polyphagia and polyuria.   Genitourinary: Negative for difficulty urinating, dysuria, flank pain, frequency and urgency.   Musculoskeletal: Negative for arthralgias, back pain, gait problem, myalgias, neck pain and neck stiffness.   Skin: Negative for color change and rash.   Allergic/Immunologic: Positive for immunocompromised state.   Neurological: Negative for dizziness, tremors, seizures, syncope, weakness, light-headedness, numbness and headaches.   Hematological: Negative for adenopathy.   Psychiatric/Behavioral: Negative for  agitation and confusion.     Objective:   Physical Exam   Constitutional: She is oriented to person, place, and time. She appears well-developed and well-nourished. No distress.   HENT:   Head: Normocephalic and atraumatic.   Right Ear: External ear normal.   Left Ear: External ear normal.   Nose: Nose normal.   Mouth/Throat: Oropharynx is clear and moist. No oropharyngeal exudate.   Eyes: Conjunctivae and EOM are normal. Pupils are equal, round, and reactive to light. Right eye exhibits no discharge. Left eye exhibits no discharge. No scleral icterus.   Neck: Normal range of motion. Neck supple. No JVD present. No tracheal deviation present. No thyromegaly present.   Cardiovascular: Normal rate, regular rhythm, normal heart sounds and intact distal pulses.  Exam reveals no gallop and no friction rub.    No murmur heard.  Pulmonary/Chest: Effort normal. No accessory muscle usage. No tachypnea. No respiratory distress. She has no wheezes. She has no rhonchi. She has rales in the right lower field. She exhibits no tenderness.   Abdominal: Soft. Bowel sounds are normal. She exhibits no distension. There is no tenderness. There is no rebound and no guarding.   Musculoskeletal: Normal range of motion. She exhibits no edema, tenderness or deformity.   Lymphadenopathy:     She has no cervical adenopathy.   Neurological: She is alert and oriented to person, place, and time. No cranial nerve deficit.   Skin: Skin is warm and dry. No rash noted. She is not diaphoretic. No erythema. No pallor.   Psychiatric: She has a normal mood and affect. Judgment normal.   Nursing note and vitals reviewed.        Vital Signs (Most Recent):  Temp: 99 °F (37.2 °C) (03/21/18 1107)  Pulse: 104 (03/21/18 1107)  Resp: 16 (03/21/18 1107)  BP: 107/71 (03/21/18 1107)  SpO2: (!) 91 % (03/21/18 1107) Vital Signs (24h Range):  Temp:  [97.8 °F (36.6 °C)-99 °F (37.2 °C)] 99 °F (37.2 °C)  Pulse:  [] 104  Resp:  [16-20] 16  SpO2:  [91 %-93 %] 91  %  BP: (101-125)/(66-96) 107/71     Weight: 59.9 kg (132 lb)  Body mass index is 24.94 kg/m².      Intake/Output Summary (Last 24 hours) at 03/21/18 1207  Last data filed at 03/20/18 2359   Gross per 24 hour   Intake              600 ml   Output                0 ml   Net              600 ml       Significant Labs:  CBC:    Recent Labs  Lab 03/21/18  0530   WBC 6.33   RBC 3.26*   HGB 9.2*   HCT 30.8*      MCV 95   MCH 28.2   MCHC 29.9*     BMP:    Recent Labs  Lab 03/21/18  0530      K 4.2      CO2 28   BUN 16   CREATININE 1.1   CALCIUM 9.2      Tacrolimus Levels:    Recent Labs  Lab 03/21/18  0530   TACROLIMUS 16.2*     Microbiology:  Microbiology Results (last 7 days)     Procedure Component Value Units Date/Time    Culture, Respiratory [[id number]] Collected:  03/20/18 0742    Order Status:  Completed Specimen:  Respiratory from BAL, RML Updated:  03/21/18 1000     Respiratory Culture No Growth     Gram Stain (Respiratory) No WBC's     Gram Stain (Respiratory) No organisms seen    Narrative:       Bronchial Wash    Culture, Anaerobe [[id number]] Collected:  03/20/18 1213    Order Status:  Completed Specimen:  Respiratory from Nares, Right Updated:  03/21/18 0743     Anaerobic Culture Culture in progress    Narrative:       Right middle meatus    Aerobic culture [[id number]] Collected:  03/20/18 1213    Order Status:  Completed Specimen:  Respiratory from Nares, Right Updated:  03/21/18 0741     Aerobic Bacterial Culture No growth    Narrative:       Right middle meatus    Gram stain [[id number]] Collected:  03/20/18 1213    Order Status:  Completed Specimen:  Respiratory from Nares, Right Updated:  03/20/18 1516     Gram Stain Result Rare WBC's      Rare yeast    Narrative:       Right middle meatus    Fungus culture [[id number]] Collected:  03/20/18 1213    Order Status:  Sent Specimen:  Respiratory from Nares, Right Updated:  03/20/18 1232    Cryptococcal antigen [[id number]] Collected:  03/20/18  1027    Order Status:  Sent Specimen:  Blood from Blood Updated:  03/20/18 1027    Fungus culture [[id number]] Collected:  03/20/18 0742    Order Status:  Sent Specimen:  Respiratory from BAL, RML Updated:  03/20/18 0837    AFB Culture & Smear [[id number]] Collected:  03/20/18 0742    Order Status:  Sent Specimen:  Respiratory from BAL, RML Updated:  03/20/18 0837    Culture, Respiratory with Gram Stain [[id number]] Collected:  03/20/18 0742    Order Status:  Canceled Specimen:  Respiratory from Sputum, Expectorated Updated:  03/20/18 0836          I have reviewed all pertinent labs within the past 24 hours.    Diagnostic Results:  Labs: Reviewed  X-Ray: Reviewed      Assessment/Plan:     * Hospital acquired PNA    Pa/lateral CXR from admission with RLL infiltrate, +crackles RLL on exam, Procalcitonin 0.16.   WBC - 4.94 today, remains afebrile, VSS, was placed on Merropenem/Vanc whilein [place] on 3/6 admission, received tobramycin x 1 3/18.     OR bronch 3/20 revealed stenotic surgical anastomosis with minor airway obstruction, as well as copious, tan/brown, thick secretions throughout the tracheobronchial tree. BAL results pending.  Continue Merropenem/Vanc, ID following, appreciate recs.   Continue daily labs.        Lung transplant status, bilateral    Received BLT on 6/12/2014 for cystic fibrosis.  Has known CARLOS EDUARDO grade 3.    Continue immunosuppressives and prophylactic antibiotics.         Prophylactic antibiotic    On Dapsone 100mg daily as an outpatient.  CMV D+/R- with hx of viremia.     Continue Dapsone. CMV PCR results pending, patient placed on valganciclovir 900 mg BID.         Immunosuppression    On Tacrolimus 3.5mg BID, MMF 500mg BID, and Prednisone 5mg as an outpatient.      Will continue Prednisone 5mg.  Continue to hold MMF in the setting of acute infection.    Hold tacrolimus today due to elevated level.   Will follow daily Tacrolimus levels and adjust as needed, today's level is 16.2.         Bronchiolitis obliterans syndrome    Has known CARLOS EDUARDO grade 3 but remains with good functional status and does not require supplemental oxygen.    No further interventions at this time.        Sinus infection    Admitted in [place] on 3/6/2018 for acute sinusitis and was to complete 14 days of inpatient IV therapy.  Developed fevers up to 101.8F on 3/17/2018 and was transferred to [facility] for further management.      No culture data available from OSH.  Most recent cultures here have shown MRSA/Pseudomonas in 2017.  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    Will continue current IV therapy with Vanc/Merrem--pre medication with IV benadryl prior to Vanc.    Received tobramycin x 1 3/18.   CT sinuses 3/18 without significant change, per ENT no active sinus infection.   Follow up Respiratory culture, viral pathogens panel, CMV PCR, aspergillus ag, and fungitell pending.    ID and ENT following, appreciate recs.         Hypertension    Continue Norvasc.         CF related Pancreatic insufficiency    On Creon with meals.    Continue with pancreatic enzymes with meals.         Anxiety disorder    Continue home medications which include Abilify and Ativan.  Remeron nightly for sleep.        Chronic pain with opiate use    Will continue home medications which include Gabapentin 1100mg TID, MS Contin 15mg TID, Oxycodone 10mg prn q6 hours, Tizanidine 4mg.        Headache    Continue with Topamax.            Shama Canales PA-C  Lung Transplant  [facility]

## 2018-03-21 NOTE — SUBJECTIVE & OBJECTIVE
Interval History: afebrile, feeling fatigue, no overnight events    Review of Systems   Constitutional: Positive for fatigue. Negative for chills and fever.   HENT: Negative for sore throat.    Respiratory: Negative for cough, chest tightness and shortness of breath.    Cardiovascular: Negative for chest pain, palpitations and leg swelling.   Gastrointestinal: Negative for abdominal distention, abdominal pain, diarrhea, nausea and vomiting.   Genitourinary: Negative for dysuria, flank pain and urgency.   Skin: Negative for rash.   Neurological: Negative for dizziness, light-headedness and numbness.   Psychiatric/Behavioral: Negative for confusion.     Objective:     Vital Signs (Most Recent):  Temp: 99 °F (37.2 °C) (03/21/18 1107)  Pulse: 104 (03/21/18 1107)  Resp: 16 (03/21/18 1107)  BP: 107/71 (03/21/18 1107)  SpO2: (!) 91 % (03/21/18 1107) Vital Signs (24h Range):  Temp:  [97.8 °F (36.6 °C)-99 °F (37.2 °C)] 99 °F (37.2 °C)  Pulse:  [] 104  Resp:  [16-20] 16  SpO2:  [91 %-93 %] 91 %  BP: (101-125)/(66-96) 107/71     Weight: 59.9 kg (132 lb)  Body mass index is 24.94 kg/m².    Estimated Creatinine Clearance: 62.7 mL/min (based on SCr of 1.1 mg/dL).    Physical Exam   Constitutional: She is oriented to person, place, and time. No distress.   HENT:   Head: Normocephalic.   Mouth/Throat: No oropharyngeal exudate.   Eyes: No scleral icterus.   Cardiovascular: Normal rate and regular rhythm.    No murmur heard.  Pulmonary/Chest: Effort normal. No respiratory distress. She has decreased breath sounds in the right lower field and the left lower field.   Abdominal: Soft. Bowel sounds are normal. She exhibits no distension. There is no tenderness. There is no rebound and no guarding.   Musculoskeletal: She exhibits no edema.   Lymphadenopathy:     She has no cervical adenopathy.   Neurological: She is alert and oriented to person, place, and time. No cranial nerve deficit.   Skin: She is not diaphoretic.   Vitals  reviewed.      Significant Labs:   Bilirubin:     Recent Labs  Lab 03/18/18  1918   BILITOT 0.5     Blood Culture: No results for input(s): LABBLOO in the last 4320 hours.  BMP:     Recent Labs  Lab 03/21/18  0530   *      K 4.2      CO2 28   BUN 16   CREATININE 1.1   CALCIUM 9.2   MG 1.7     CBC:     Recent Labs  Lab 03/20/18  0500 03/21/18  0530   WBC 4.94 6.33   HGB 8.7* 9.2*   HCT 28.2* 30.8*   * 205     CMP:     Recent Labs  Lab 03/20/18  0500 03/21/18  0530    142   K 4.2 4.2    105   CO2 26 28   GLU 87 112*   BUN 16 16   CREATININE 1.0 1.1   CALCIUM 8.6* 9.2   ANIONGAP 6* 9   EGFRNONAA >60.0 >60.0     Microbiology Results (last 7 days)     Procedure Component Value Units Date/Time    AFB Culture & Smear [[id number]] Collected:  03/20/18 0742    Order Status:  Completed Specimen:  Respiratory from [facility], [facility] Updated:  03/21/18 1340     AFB CULTURE STAIN No acid fast bacilli seen.    Narrative:       Bronchial Wash    Cryptococcal antigen [[id number]] Collected:  03/20/18 1027    Order Status:  Completed Specimen:  Blood from Blood Updated:  03/21/18 1216     Cryptococcal Ag, Blood Negative    Culture, Respiratory [[id number]] Collected:  03/20/18 0742    Order Status:  Completed Specimen:  Respiratory from [facility], L Updated:  03/21/18 1000     Respiratory Culture No Growth     Gram Stain (Respiratory) No WBC's     Gram Stain (Respiratory) No organisms seen    Narrative:       Bronchial Wash    Culture, Anaerobe [[id number]] Collected:  03/20/18 1213    Order Status:  Completed Specimen:  Respiratory from Nares, Right Updated:  03/21/18 0743     Anaerobic Culture Culture in progress    Narrative:       Right middle meatus    Aerobic culture [[id number]] Collected:  03/20/18 1213    Order Status:  Completed Specimen:  Respiratory from Nares, Right Updated:  03/21/18 0741     Aerobic Bacterial Culture No growth    Narrative:       Right middle meatus    Gram stain [[id number]]  Collected:  03/20/18 1213    Order Status:  Completed Specimen:  Respiratory from Nares, Right Updated:  03/20/18 1516     Gram Stain Result Rare WBC's      Rare yeast    Narrative:       Right middle meatus    Fungus culture [[id number]] Collected:  03/20/18 1213    Order Status:  Sent Specimen:  Respiratory from Nares, Right Updated:  03/20/18 1232    Fungus culture [[id number]] Collected:  03/20/18 0742    Order Status:  Sent Specimen:  Respiratory from BAL, RML Updated:  03/20/18 0837    Culture, Respiratory with Gram Stain [[id number]] Collected:  03/20/18 0742    Order Status:  Canceled Specimen:  Respiratory from Sputum, Expectorated Updated:  03/20/18 0836          Significant Imaging: I have reviewed all pertinent imaging results/findings within the past 24 hours.

## 2018-03-21 NOTE — SUBJECTIVE & OBJECTIVE
Subjective:     Interval History: No acute events overnight. Remains afebrile on Meropenem/Vanc. BAL results from 3/20 bronch pending. Congestion and postnasal drip without change. Currently 93% on room air. No other complaints at this time.     Continuous Infusions:  Scheduled Meds:   amLODIPine  5 mg Oral Daily    ARIPiprazole  2 mg Oral QHS    calcium-vitamin D3  1 tablet Oral BID    dapsone  100 mg Oral Daily    docusate sodium  100 mg Oral BID    fluconazole  150 mg Oral Once    fluticasone  2 spray Each Nare Daily    folic acid  1 mg Oral Daily    gabapentin  1,100 mg Oral TID    lipase-protease-amylase 24,000-76,000-120,000 units  6 capsule Oral TID WM    magnesium oxide  400 mg Oral BID    meropenem (MERREM) IVPB  1 g Intravenous Q8H    mirtazapine  45 mg Oral QHS    montelukast  10 mg Oral Daily    morphine  15 mg Oral TID    multivitamin  1 tablet Oral Daily    pantoprazole  40 mg Oral Daily    predniSONE  5 mg Oral Daily    tacrolimus  3.5 mg Oral BID    topiramate  25 mg Oral BID    valGANciclovir  900 mg Oral BID    vancomycin (VANCOCIN) IVPB  750 mg Intravenous Q12H     PRN Meds:butalbital-acetaminophen-caffeine -40 mg, diphenhydrAMINE, heparin, porcine (PF), heparin, porcine (PF), levalbuterol, LORazepam, ondansetron, oxyCODONE, promethazine, tiZANidine    Review of patient's allergies indicates:   Allergen Reactions    Albuterol Palpitations    Colistin Anaphylaxis    Vancomycin analogues      Infusion reaction that does not resolve with slowing    Neupogen [filgrastim] Other (See Comments)     Ostealgia after five daily doses of 300 mcg.      Bactrim [sulfamethoxazole-trimethoprim] Hives    Ceftazidime Hives     Pt stated can tolerate cefapine not ceftazidime    Ceftazidime     Dronabinol Other (See Comments)     Mental changes/hallucinations    Haldol [haloperidol lactate] Other (See Comments)     Seizure like activity    Nsaids (non-steroidal anti-inflammatory  drug)      Cannot have due to lung transplant    Adhesive Rash     Cloth tape- please use tegaderm or paper tape    Aztreonam Rash    Ciprofloxacin Nausea And Vomiting     Projectile N/V, per patient.  Unwilling to retry therapy.       Review of Systems   Constitutional: Positive for chills and fatigue. Negative for activity change, appetite change, diaphoresis and fever.   HENT: Positive for congestion and postnasal drip. Negative for dental problem, drooling, ear discharge, ear pain, facial swelling, hearing loss, mouth sores, nosebleeds, rhinorrhea, sinus pain, sinus pressure, sneezing, sore throat and voice change.    Eyes: Negative for photophobia, pain, redness, itching and visual disturbance.   Respiratory: Negative for cough, choking, chest tightness, shortness of breath, wheezing and stridor.    Cardiovascular: Negative for chest pain, palpitations and leg swelling.   Gastrointestinal: Negative for abdominal distention, abdominal pain, constipation, diarrhea, nausea and vomiting.   Endocrine: Negative for cold intolerance, heat intolerance, polydipsia, polyphagia and polyuria.   Genitourinary: Negative for difficulty urinating, dysuria, flank pain, frequency and urgency.   Musculoskeletal: Negative for arthralgias, back pain, gait problem, myalgias, neck pain and neck stiffness.   Skin: Negative for color change and rash.   Allergic/Immunologic: Positive for immunocompromised state.   Neurological: Negative for dizziness, tremors, seizures, syncope, weakness, light-headedness, numbness and headaches.   Hematological: Negative for adenopathy.   Psychiatric/Behavioral: Negative for agitation and confusion.     Objective:   Physical Exam   Constitutional: She is oriented to person, place, and time. She appears well-developed and well-nourished. No distress.   HENT:   Head: Normocephalic and atraumatic.   Right Ear: External ear normal.   Left Ear: External ear normal.   Nose: Nose normal.   Mouth/Throat:  Oropharynx is clear and moist. No oropharyngeal exudate.   Eyes: Conjunctivae and EOM are normal. Pupils are equal, round, and reactive to light. Right eye exhibits no discharge. Left eye exhibits no discharge. No scleral icterus.   Neck: Normal range of motion. Neck supple. No JVD present. No tracheal deviation present. No thyromegaly present.   Cardiovascular: Normal rate, regular rhythm, normal heart sounds and intact distal pulses.  Exam reveals no gallop and no friction rub.    No murmur heard.  Pulmonary/Chest: Effort normal. No accessory muscle usage. No tachypnea. No respiratory distress. She has no wheezes. She has no rhonchi. She has rales in the right lower field. She exhibits no tenderness.   Abdominal: Soft. Bowel sounds are normal. She exhibits no distension. There is no tenderness. There is no rebound and no guarding.   Musculoskeletal: Normal range of motion. She exhibits no edema, tenderness or deformity.   Lymphadenopathy:     She has no cervical adenopathy.   Neurological: She is alert and oriented to person, place, and time. No cranial nerve deficit.   Skin: Skin is warm and dry. No rash noted. She is not diaphoretic. No erythema. No pallor.   Psychiatric: She has a normal mood and affect. Judgment normal.   Nursing note and vitals reviewed.        Vital Signs (Most Recent):  Temp: 99 °F (37.2 °C) (03/21/18 1107)  Pulse: 104 (03/21/18 1107)  Resp: 16 (03/21/18 1107)  BP: 107/71 (03/21/18 1107)  SpO2: (!) 91 % (03/21/18 1107) Vital Signs (24h Range):  Temp:  [97.8 °F (36.6 °C)-99 °F (37.2 °C)] 99 °F (37.2 °C)  Pulse:  [] 104  Resp:  [16-20] 16  SpO2:  [91 %-93 %] 91 %  BP: (101-125)/(66-96) 107/71     Weight: 59.9 kg (132 lb)  Body mass index is 24.94 kg/m².      Intake/Output Summary (Last 24 hours) at 03/21/18 1207  Last data filed at 03/20/18 0099   Gross per 24 hour   Intake              600 ml   Output                0 ml   Net              600 ml       Significant Labs:  CBC:    Recent  Labs  Lab 03/21/18  0530   WBC 6.33   RBC 3.26*   HGB 9.2*   HCT 30.8*      MCV 95   MCH 28.2   MCHC 29.9*     BMP:    Recent Labs  Lab 03/21/18  0530      K 4.2      CO2 28   BUN 16   CREATININE 1.1   CALCIUM 9.2      Tacrolimus Levels:    Recent Labs  Lab 03/21/18  0530   TACROLIMUS 16.2*     Microbiology:  Microbiology Results (last 7 days)     Procedure Component Value Units Date/Time    Culture, Respiratory [[id number]] Collected:  03/20/18 0742    Order Status:  Completed Specimen:  Respiratory from BAL, RML Updated:  03/21/18 1000     Respiratory Culture No Growth     Gram Stain (Respiratory) No WBC's     Gram Stain (Respiratory) No organisms seen    Narrative:       Bronchial Wash    Culture, Anaerobe [[id number]] Collected:  03/20/18 1213    Order Status:  Completed Specimen:  Respiratory from Nares, Right Updated:  03/21/18 0743     Anaerobic Culture Culture in progress    Narrative:       Right middle meatus    Aerobic culture [[id number]] Collected:  03/20/18 1213    Order Status:  Completed Specimen:  Respiratory from Nares, Right Updated:  03/21/18 0741     Aerobic Bacterial Culture No growth    Narrative:       Right middle meatus    Gram stain [[id number]] Collected:  03/20/18 1213    Order Status:  Completed Specimen:  Respiratory from Nares, Right Updated:  03/20/18 1516     Gram Stain Result Rare WBC's      Rare yeast    Narrative:       Right middle meatus    Fungus culture [[id number]] Collected:  03/20/18 1213    Order Status:  Sent Specimen:  Respiratory from Nares, Right Updated:  03/20/18 1232    Cryptococcal antigen [[id number]] Collected:  03/20/18 1027    Order Status:  Sent Specimen:  Blood from Blood Updated:  03/20/18 1027    Fungus culture [[id number]] Collected:  03/20/18 0742    Order Status:  Sent Specimen:  Respiratory from BAL, RML Updated:  03/20/18 0837    AFB Culture & Smear [[id number]] Collected:  03/20/18 0742    Order Status:  Sent Specimen:  Respiratory from  BAL, RML Updated:  03/20/18 0837    Culture, Respiratory with Gram Stain [[id number]] Collected:  03/20/18 0742    Order Status:  Canceled Specimen:  Respiratory from Sputum, Expectorated Updated:  03/20/18 0836          I have reviewed all pertinent labs within the past 24 hours.    Diagnostic Results:  Labs: Reviewed  X-Ray: Reviewed

## 2018-03-21 NOTE — ASSESSMENT & PLAN NOTE
Admitted in [place] on 3/6/2018 for acute sinusitis and was to complete 14 days of inpatient IV therapy.  Developed fevers up to 101.8F on 3/17/2018 and was transferred to [facility] for further management.      No culture data available from OSH.  Most recent cultures here have shown MRSA/Pseudomonas in 2017.  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    Will continue current IV therapy with Vanc/Merrem--pre medication with IV benadryl prior to Vanc.    Received tobramycin x 1 3/18.   CT sinuses 3/18 without significant change, per ENT no active sinus infection.   Follow up Respiratory culture, viral pathogens panel, CMV PCR, aspergillus ag, and fungitell pending.    ID and ENT following, appreciate recs.

## 2018-03-21 NOTE — PLAN OF CARE
Problem: Patient Care Overview  Goal: Plan of Care Review  Outcome: Ongoing (interventions implemented as appropriate)  - Contact isolation for CF  - Give Benadryl prior to Vanc to prevent flushing & itching  - NS bolus complete.  Last , O2 sats 90%  - Bed low & locked, call light in reach, refusing nonslip socks, calls for assistance appropriately  - Update whiteboard throughout shift  - PRN meds moderately effective with anxiety - patient calms with conversation & listening also

## 2018-03-21 NOTE — SUBJECTIVE & OBJECTIVE
Interval History: ALAN Denies any issues with her sinuses.     Medications:  Continuous Infusions:  Scheduled Meds:   amLODIPine  5 mg Oral Daily    ARIPiprazole  2 mg Oral QHS    calcium-vitamin D3  1 tablet Oral BID    dapsone  100 mg Oral Daily    docusate sodium  100 mg Oral BID    fluticasone  2 spray Each Nare Daily    folic acid  1 mg Oral Daily    gabapentin  1,100 mg Oral TID    lipase-protease-amylase 24,000-76,000-120,000 units  6 capsule Oral TID WM    magnesium oxide  400 mg Oral BID    meropenem (MERREM) IVPB  1 g Intravenous Q8H    mirtazapine  45 mg Oral QHS    montelukast  10 mg Oral Daily    morphine  15 mg Oral TID    multivitamin  1 tablet Oral Daily    pantoprazole  40 mg Oral Daily    predniSONE  5 mg Oral Daily    topiramate  25 mg Oral BID    valGANciclovir  900 mg Oral BID    vancomycin (VANCOCIN) IVPB  750 mg Intravenous Q12H     PRN Meds:butalbital-acetaminophen-caffeine -40 mg, diphenhydrAMINE, heparin, porcine (PF), heparin, porcine (PF), levalbuterol, LORazepam, ondansetron, oxyCODONE, promethazine, tiZANidine     Review of patient's allergies indicates:   Allergen Reactions    Albuterol Palpitations    Colistin Anaphylaxis    Vancomycin analogues      Infusion reaction that does not resolve with slowing    Neupogen [filgrastim] Other (See Comments)     Ostealgia after five daily doses of 300 mcg.      Bactrim [sulfamethoxazole-trimethoprim] Hives    Ceftazidime Hives     Pt stated can tolerate cefapine not ceftazidime    Ceftazidime     Dronabinol Other (See Comments)     Mental changes/hallucinations    Haldol [haloperidol lactate] Other (See Comments)     Seizure like activity    Nsaids (non-steroidal anti-inflammatory drug)      Cannot have due to lung transplant    Adhesive Rash     Cloth tape- please use tegaderm or paper tape    Aztreonam Rash    Ciprofloxacin Nausea And Vomiting     Projectile N/V, per patient.  Unwilling to retry  therapy.     Objective:     Vital Signs (24h Range):  Temp:  [97.8 °F (36.6 °C)-99 °F (37.2 °C)] 99 °F (37.2 °C)  Pulse:  [] 104  Resp:  [16-18] 16  SpO2:  [91 %-93 %] 91 %  BP: (101-125)/(66-96) 107/71        Lines/Drains/Airways     Central Venous Catheter Line                 Port A Cath Single Lumen 08/02/16 0856 right subclavian 596 days                Physical Exam     NAD, alert, awake  EOMI  External nose normal without lesions  Oral cavity with moist mucous membranes, no lesions  Neck soft and supple, non-tender  Voice normal  Normal work of breathing.       Rigid nasal endoscopy performed 3/19/2018:   Edematous sinus mucosa with crusting, bilateral patent ethmoid cavity and maxillary antrostomies.  No evidence of active infection, no bleeding or ulceration.    Significant Labs:  BMP:     Recent Labs  Lab 03/21/18  0530   *      CO2 28   BUN 16   CREATININE 1.1   CALCIUM 9.2   MG 1.7     CBC:     Recent Labs  Lab 03/21/18  0530   WBC 6.33   RBC 3.26*   HGB 9.2*   HCT 30.8*      MCV 95   MCH 28.2   MCHC 29.9*       Significant Diagnostics:  CT: I have reviewed all pertinent results/findings within the past 24 hours and my personal findings are:  mucosal thickening in sinuses

## 2018-03-21 NOTE — PROGRESS NOTES
[facility]  Otorhinolaryngology-Head & Neck Surgery  Progress Note    Subjective:     Post-Op Info:  Procedure(s) (LRB):  BRONCHOSCOPY, BAL, BIOPSIES (N/A)   1 Day Post-Op  Hospital Day: 4     Interval History: ALAN Denies any issues with her sinuses.     Medications:  Continuous Infusions:  Scheduled Meds:   amLODIPine  5 mg Oral Daily    ARIPiprazole  2 mg Oral QHS    calcium-vitamin D3  1 tablet Oral BID    dapsone  100 mg Oral Daily    docusate sodium  100 mg Oral BID    fluticasone  2 spray Each Nare Daily    folic acid  1 mg Oral Daily    gabapentin  1,100 mg Oral TID    lipase-protease-amylase 24,000-76,000-120,000 units  6 capsule Oral TID WM    magnesium oxide  400 mg Oral BID    meropenem (MERREM) IVPB  1 g Intravenous Q8H    mirtazapine  45 mg Oral QHS    montelukast  10 mg Oral Daily    morphine  15 mg Oral TID    multivitamin  1 tablet Oral Daily    pantoprazole  40 mg Oral Daily    predniSONE  5 mg Oral Daily    topiramate  25 mg Oral BID    valGANciclovir  900 mg Oral BID    vancomycin (VANCOCIN) IVPB  750 mg Intravenous Q12H     PRN Meds:butalbital-acetaminophen-caffeine -40 mg, diphenhydrAMINE, heparin, porcine (PF), heparin, porcine (PF), levalbuterol, LORazepam, ondansetron, oxyCODONE, promethazine, tiZANidine     Review of patient's allergies indicates:   Allergen Reactions    Albuterol Palpitations    Colistin Anaphylaxis    Vancomycin analogues      Infusion reaction that does not resolve with slowing    Neupogen [filgrastim] Other (See Comments)     Ostealgia after five daily doses of 300 mcg.      Bactrim [sulfamethoxazole-trimethoprim] Hives    Ceftazidime Hives     Pt stated can tolerate cefapine not ceftazidime    Ceftazidime     Dronabinol Other (See Comments)     Mental changes/hallucinations    Haldol [haloperidol lactate] Other (See Comments)     Seizure like activity    Nsaids (non-steroidal anti-inflammatory drug)      Cannot  "have due to lung transplant    Adhesive Rash     Cloth tape- please use tegaderm or paper tape    Aztreonam Rash    Ciprofloxacin Nausea And Vomiting     Projectile N/V, per patient.  Unwilling to retry therapy.     Objective:     Vital Signs (24h Range):  Temp:  [97.8 °F (36.6 °C)-99 °F (37.2 °C)] 99 °F (37.2 °C)  Pulse:  [] 104  Resp:  [16-18] 16  SpO2:  [91 %-93 %] 91 %  BP: (101-125)/(66-96) 107/71        Lines/Drains/Airways     Central Venous Catheter Line                 Port A Cath Single Lumen 08/02/16 0856 right subclavian 596 days                Physical Exam     NAD, alert, awake  EOMI  External nose normal without lesions  Oral cavity with moist mucous membranes, no lesions  Neck soft and supple, non-tender  Voice normal  Normal work of breathing.       Rigid nasal endoscopy performed 3/19/2018:   Edematous sinus mucosa with crusting, bilateral patent ethmoid cavity and maxillary antrostomies.  No evidence of active infection, no bleeding or ulceration.    Significant Labs:  BMP:     Recent Labs  Lab 03/21/18  0530   *      CO2 28   BUN 16   CREATININE 1.1   CALCIUM 9.2   MG 1.7     CBC:     Recent Labs  Lab 03/21/18  0530   WBC 6.33   RBC 3.26*   HGB 9.2*   HCT 30.8*      MCV 95   MCH 28.2   MCHC 29.9*       Significant Diagnostics:  CT: I have reviewed all pertinent results/findings within the past 24 hours and my personal findings are:  mucosal thickening in sinuses    Assessment/Plan:     Sinus infection    Juanita "Amado Ibarra is a patient well-known to the ENT service with a history of CF and lung transplant, sinusitis, and otitis media. Sinus exudates stable, minimal polyps or purulence on nasal endoscopy.     -Do not believe there is an active sinus infection currently  -Continue antibiotics per primary team pending cultures   -Culture swab from right middle meatus - sent for anaerobic, aerobic, grams stain, and fungal culture. Rare yeast on gram stain. Other " cultures still pending. Will follow up   -plan to resume topical antibiotic rinses upon return home   -ENT will follow, please call with questions               Anuja Franklin MD  Otorhinolaryngology-Head & Neck Surgery  [facility]

## 2018-03-21 NOTE — ASSESSMENT & PLAN NOTE
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 She is stable currently on empiric vanc/beatrice/tobra. Sinus CT shows Postoperative change of prior partial ethmoidectomies and bilateral uncinectomies with severe paranasal sinus opacification; but per patient report no evidence of ongoing active purulent sinus disease or concerns for fungal disease per ENT scope    - bronchoscopy 3/20/18(cell count 278, 29% N, 15%L, 56%M, gram stain negative, cultures NGTD)  - would stop valganciclovir, CMV BAL and CMV blood negative  - continue current antibiotic therapy with vancomycin + meropenem until final cultures are available  - follow work up that is pending

## 2018-03-21 NOTE — PROGRESS NOTES
I will STOP taking the medications listed below when I get home from the hospital:  None [facility]  Infectious Disease  Progress Note    Patient Name: Juanita Ibarra  MRN: [id number]  Admission Date: 3/18/2018  Length of Stay: 3 days  Attending Physician: Jake Alvarez MD  Primary Care Provider: Jake Alvarez MD    Isolation Status: Contact  Assessment/Plan:      * Hospital acquired PNA    29yo woman w/a history of CF (c/b pancreatic insufficiency, sinus disease, MRSA/Pseudomonas colonization c/b numerous antibiotics allergies, and subsequent ESLD; s/p BOLT 6/12/2014, CMV D+/R-, simulect induction, on maintenance tacro/MMF/pred; c/b multiple episodes of MRSA/Pseudomonas pneumonia from 6221-6308, 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 She is stable currently on empiric vanc/beatrice/tobra. Sinus CT shows Postoperative change of prior partial ethmoidectomies and bilateral uncinectomies with severe paranasal sinus opacification; but per patient report no evidence of ongoing active purulent sinus disease or concerns for fungal disease per ENT scope    - bronchoscopy 3/20/18(cell count 278, 29% N, 15%L, 56%M, gram stain negative, cultures NGTD)  - would stop valganciclovir, CMV BAL and CMV blood negative  - continue current antibiotic therapy with vancomycin + meropenem until final cultures are available  - follow work up that is pending            Anticipated Disposition: pending    Thank you for your consult. I will follow-up with patient. Please contact us if you have any additional questions.    Jose Roberto Mercado MD  Infectious Disease Fellow, PGY-5  Spectra: [phone]  Pager: [phone]  [facility]    Subjective:     Principal Problem:Hospital acquired PNA    HPI: Ms. 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 She was first seen by her PCP with sinus congestion she attributed to seasonal allergies at the beginning of 3/2018 but was then admitted to an OSH in [place] on 3/6/2018 when she developed purulent sinus congestion, associated CARROLL, and productive cough. She was treated with an empiric vanc/meropenem course for pansinusitis with clinical improvement until HD #11 when she developed F/C/S (Tm 101.8F), worsening non-productive cough, and SOB with an increasing oxygen requirement. Tobramycin was added to her current regimen prior [place] [facility] [place] in her fever curve but not other symptoms to date. She feels that her symptoms somewhat resemble prior episodes of CMV reactivations she has had. She has been afebrile in house without a leukocytosis. Procalcitonin is not elevated and CT sinuses reveals pansinusitis with some concern of fungal colonization in some areas radiographically. No sinus cultures were obtained at the OSH facility and ENT evaluation here with endoscopy was unremarkable per patient report. Bronchoscopy is planned for tomorrow.  Interval History: afebrile, feeling fatigue, no overnight events    Review of Systems   Constitutional: Positive for fatigue. Negative for chills and fever.   HENT: Negative for sore throat.    Respiratory: Negative for cough, chest  tightness and shortness of breath.    Cardiovascular: Negative for chest pain, palpitations and leg swelling.   Gastrointestinal: Negative for abdominal distention, abdominal pain, diarrhea, nausea and vomiting.   Genitourinary: Negative for dysuria, flank pain and urgency.   Skin: Negative for rash.   Neurological: Negative for dizziness, light-headedness and numbness.   Psychiatric/Behavioral: Negative for confusion.     Objective:     Vital Signs (Most Recent):  Temp: 99 °F (37.2 °C) (03/21/18 1107)  Pulse: 104 (03/21/18 1107)  Resp: 16 (03/21/18 1107)  BP: 107/71 (03/21/18 1107)  SpO2: (!) 91 % (03/21/18 1107) Vital Signs (24h Range):  Temp:  [97.8 °F (36.6 °C)-99 °F (37.2 °C)] 99 °F (37.2 °C)  Pulse:  [] 104  Resp:  [16-20] 16  SpO2:  [91 %-93 %] 91 %  BP: (101-125)/(66-96) 107/71     Weight: 59.9 kg (132 lb)  Body mass index is 24.94 kg/m².    Estimated Creatinine Clearance: 62.7 mL/min (based on SCr of 1.1 mg/dL).    Physical Exam   Constitutional: She is oriented to person, place, and time. No distress.   HENT:   Head: Normocephalic.   Mouth/Throat: No oropharyngeal exudate.   Eyes: No scleral icterus.   Cardiovascular: Normal rate and regular rhythm.    No murmur heard.  Pulmonary/Chest: Effort normal. No respiratory distress. She has decreased breath sounds in the right lower field and the left lower field.   Abdominal: Soft. Bowel sounds are normal. She exhibits no distension. There is no tenderness. There is no rebound and no guarding.   Musculoskeletal: She exhibits no edema.   Lymphadenopathy:     She has no cervical adenopathy.   Neurological: She is alert and oriented to person, place, and time. No cranial nerve deficit.   Skin: She is not diaphoretic.   Vitals reviewed.      Significant Labs:   Bilirubin:     Recent Labs  Lab 03/18/18  1918   BILITOT 0.5     Blood Culture: No results for input(s): LABBLOO in the last 4320 hours.  BMP:     Recent Labs  Lab 03/21/18  0530   *      K  4.2      CO2 28   BUN 16   CREATININE 1.1   CALCIUM 9.2   MG 1.7     CBC:     Recent Labs  Lab 03/20/18  0500 03/21/18  0530   WBC 4.94 6.33   HGB 8.7* 9.2*   HCT 28.2* 30.8*   * 205     CMP:     Recent Labs  Lab 03/20/18  0500 03/21/18  0530    142   K 4.2 4.2    105   CO2 26 28   GLU 87 112*   BUN 16 16   CREATININE 1.0 1.1   CALCIUM 8.6* 9.2   ANIONGAP 6* 9   EGFRNONAA >60.0 >60.0     Microbiology Results (last 7 days)     Procedure Component Value Units Date/Time    AFB Culture & Smear [[id number]] Collected:  03/20/18 0742    Order Status:  Completed Specimen:  Respiratory from [facility], L Updated:  03/21/18 1340     AFB CULTURE STAIN No acid fast bacilli seen.    Narrative:       Bronchial Wash    Cryptococcal antigen [[id number]] Collected:  03/20/18 1027    Order Status:  Completed Specimen:  Blood from Blood Updated:  03/21/18 1216     Cryptococcal Ag, Blood Negative    Culture, Respiratory [[id number]] Collected:  03/20/18 0742    Order Status:  Completed Specimen:  Respiratory from [facility], L Updated:  03/21/18 1000     Respiratory Culture No Growth     Gram Stain (Respiratory) No WBC's     Gram Stain (Respiratory) No organisms seen    Narrative:       Bronchial Wash    Culture, Anaerobe [[id number]] Collected:  03/20/18 1213    Order Status:  Completed Specimen:  Respiratory from Nares, Right Updated:  03/21/18 0743     Anaerobic Culture Culture in progress    Narrative:       Right middle meatus    Aerobic culture [[id number]] Collected:  03/20/18 1213    Order Status:  Completed Specimen:  Respiratory from Nares, Right Updated:  03/21/18 0741     Aerobic Bacterial Culture No growth    Narrative:       Right middle meatus    Gram stain [[id number]] Collected:  03/20/18 1213    Order Status:  Completed Specimen:  Respiratory from Nares, Right Updated:  03/20/18 1516     Gram Stain Result Rare WBC's      Rare yeast    Narrative:       Right middle meatus    Fungus culture [[id number]]  Collected:  03/20/18 1213    Order Status:  Sent Specimen:  Respiratory from Nares, Right Updated:  03/20/18 1232    Fungus culture [[id number]] Collected:  03/20/18 0742    Order Status:  Sent Specimen:  Respiratory from BAL, RML Updated:  03/20/18 0837    Culture, Respiratory with Gram Stain [[id number]] Collected:  03/20/18 0742    Order Status:  Canceled Specimen:  Respiratory from Sputum, Expectorated Updated:  03/20/18 0836          Significant Imaging: I have reviewed all pertinent imaging results/findings within the past 24 hours.

## 2018-03-21 NOTE — ASSESSMENT & PLAN NOTE
"Juanita Ibarra (Tori) is a patient well-known to the ENT service with a history of CF and lung transplant, sinusitis, and otitis media. Sinus exudates stable, minimal polyps or purulence on nasal endoscopy.     -Do not believe there is an active sinus infection currently  -Continue antibiotics per primary team pending cultures   -Culture swab from right middle meatus - sent for anaerobic, aerobic, grams stain, and fungal culture. Rare yeast on gram stain. Other cultures still pending. Will follow up   -plan to resume topical antibiotic rinses upon return home   -ENT will follow, please call with questions     "

## 2018-03-21 NOTE — ASSESSMENT & PLAN NOTE
Pa/lateral CXR from admission with RLL infiltrate, +crackles RLL on exam, Procalcitonin 0.16.   WBC - 4.94 today, remains afebrile, VSS, was placed on Merropenem/Vanc whilein [place] on 3/6 admission, received tobramycin x 1 3/18.     OR bronch 3/20 revealed stenotic surgical anastomosis with minor airway obstruction, as well as copious, tan/brown, thick secretions throughout the tracheobronchial tree. BAL results pending.  Continue Merropenem/Vanc, ID following, appreciate recs.   Continue daily labs.

## 2018-03-21 NOTE — PROGRESS NOTES
Critical care MD covering for Lung transplant, Dr. Raya, notified of critical Vanc trough level of 31.5.     MD ordered to hold all further doses of Vanc until further notice.

## 2018-03-21 NOTE — PROGRESS NOTES
"   03/21/18 1530   Vital Signs   Temp 98.1 °F (36.7 °C)   Pulse (!) 129   Heart Rate Source Apical   Resp 18   SpO2 (!) 88 %   O2 Device (Oxygen Therapy) room air   /71   Spoke to PA on call re: patient called RN with c/o "my heart's beating real fast & my pulse ox is low"  See VS above.  PA ordered 500 mL NS bolus.  Placed patient on bedside telemetry for closer monitoring - regular rhythm.  Monitor & recheck VS following bolus  "

## 2018-03-22 VITALS
OXYGEN SATURATION: 94 % | BODY MASS INDEX: 27.68 KG/M2 | HEART RATE: 92 BPM | DIASTOLIC BLOOD PRESSURE: 76 MMHG | HEIGHT: 61 IN | TEMPERATURE: 98 F | SYSTOLIC BLOOD PRESSURE: 117 MMHG | WEIGHT: 146.63 LBS | RESPIRATION RATE: 18 BRPM

## 2018-03-22 LAB
ANION GAP SERPL CALC-SCNC: 7 MMOL/L
BACTERIA SPEC AEROBE CULT: NO GROWTH
BASOPHILS # BLD AUTO: 0.04 K/UL
BASOPHILS NFR BLD: 0.7 %
BUN SERPL-MCNC: 14 MG/DL
CALCIUM SERPL-MCNC: 8.8 MG/DL
CHLORIDE SERPL-SCNC: 107 MMOL/L
CO2 SERPL-SCNC: 28 MMOL/L
CREAT SERPL-MCNC: 1 MG/DL
DIFFERENTIAL METHOD: ABNORMAL
ENTEROVIRUS: NOT DETECTED
ENTEROVIRUS: NOT DETECTED
EOSINOPHIL # BLD AUTO: 0.1 K/UL
EOSINOPHIL NFR BLD: 2.2 %
ERYTHROCYTE [DISTWIDTH] IN BLOOD BY AUTOMATED COUNT: 14.3 %
EST. GFR  (AFRICAN AMERICAN): >60 ML/MIN/1.73 M^2
EST. GFR  (NON AFRICAN AMERICAN): >60 ML/MIN/1.73 M^2
GLUCOSE SERPL-MCNC: 109 MG/DL
GRAM STN SPEC: NORMAL
GRAM STN SPEC: NORMAL
HCT VFR BLD AUTO: 28.4 %
HGB BLD-MCNC: 8.5 G/DL
HISTOPLASMA AG VALUE: 0 NG/ML
HISTOPLASMA ANTIGEN URINE: NEGATIVE
HUMAN BOCAVIRUS: NOT DETECTED
HUMAN BOCAVIRUS: NOT DETECTED
HUMAN CORONAVIRUS, COMMON COLD VIRUS: NOT DETECTED
HUMAN CORONAVIRUS, COMMON COLD VIRUS: NOT DETECTED
IMM GRANULOCYTES # BLD AUTO: 0.03 K/UL
IMM GRANULOCYTES NFR BLD AUTO: 0.5 %
INFLUENZA A - H1N1-09: NOT DETECTED
INFLUENZA A - H1N1-09: NOT DETECTED
LEGIONELLA PNEUMOPHILA: NOT DETECTED
LEGIONELLA PNEUMOPHILA: NOT DETECTED
LYMPHOCYTES # BLD AUTO: 2.1 K/UL
LYMPHOCYTES NFR BLD: 35.4 %
MAGNESIUM SERPL-MCNC: 1.6 MG/DL
MCH RBC QN AUTO: 28.3 PG
MCHC RBC AUTO-ENTMCNC: 29.9 G/DL
MCV RBC AUTO: 95 FL
MONOCYTES # BLD AUTO: 0.4 K/UL
MONOCYTES NFR BLD: 6.2 %
MORAXELLA CATARRHALIS: NOT DETECTED
MORAXELLA CATARRHALIS: NOT DETECTED
NEUTROPHILS # BLD AUTO: 3.2 K/UL
NEUTROPHILS NFR BLD: 55 %
NRBC BLD-RTO: 0 /100 WBC
PARAINFLUENZA: NOT DETECTED
PARAINFLUENZA: NOT DETECTED
PLATELET # BLD AUTO: 162 K/UL
PMV BLD AUTO: 10.3 FL
POTASSIUM SERPL-SCNC: 3.9 MMOL/L
RBC # BLD AUTO: 3 M/UL
RVP - ADENOVIRUS: NOT DETECTED
RVP - ADENOVIRUS: NOT DETECTED
RVP - HUMAN METAPNEUMOVIRUS (HMPV): NOT DETECTED
RVP - HUMAN METAPNEUMOVIRUS (HMPV): NOT DETECTED
RVP - INFLUENZA A: NOT DETECTED
RVP - INFLUENZA A: NOT DETECTED
RVP - INFLUENZA B: NOT DETECTED
RVP - INFLUENZA B: NOT DETECTED
RVP - RESPIRATORY SYNCTIAL VIRUS (RSV) A: NOT DETECTED
RVP - RESPIRATORY SYNCTIAL VIRUS (RSV) A: NOT DETECTED
RVP - RESPIRATORY VIRAL PANEL, SOURCE: NORMAL
RVP - RESPIRATORY VIRAL PANEL, SOURCE: NORMAL
RVP - RHINOVIRUS: NOT DETECTED
RVP - RHINOVIRUS: NOT DETECTED
SODIUM SERPL-SCNC: 142 MMOL/L
TACROLIMUS BLD-MCNC: 7.3 NG/ML
TEM - ACINETOBACTER BAUMANNII: NOT DETECTED
TEM - ACINETOBACTER BAUMANNII: NOT DETECTED
TEM - BORDETELLA PERTUSSIS: NOT DETECTED
TEM - BORDETELLA PERTUSSIS: NOT DETECTED
TEM - CHLAMYDOPHILA PNEUMONIAE: NOT DETECTED
TEM - CHLAMYDOPHILA PNEUMONIAE: NOT DETECTED
TEM - KLEBSIELLA PNEUMONIAE: NOT DETECTED
TEM - KLEBSIELLA PNEUMONIAE: NOT DETECTED
TEM - MRSA: NOT DETECTED
TEM - MRSA: NOT DETECTED
TEM - MYCOPLASMA PNEUMONIAE: NOT DETECTED
TEM - MYCOPLASMA PNEUMONIAE: NOT DETECTED
TEM - NEISSERIA MENINGITIDIS: NOT DETECTED
TEM - NEISSERIA MENINGITIDIS: NOT DETECTED
TEM - PANTON-VALENTINE: NOT DETECTED
TEM - PANTON-VALENTINE: NOT DETECTED
TEM - PSEUDOMONAS AERUGINOSA: NOT DETECTED
TEM - PSEUDOMONAS AERUGINOSA: NOT DETECTED
TEM - STAPHYLOCOCCUS AUREUS: NOT DETECTED
TEM - STAPHYLOCOCCUS AUREUS: NOT DETECTED
TEM - STREPTOCOCCUS PNEUMONIAE: NOT DETECTED
TEM - STREPTOCOCCUS PNEUMONIAE: NOT DETECTED
TEM - STREPTOCOCCUS PYOGENES A: NOT DETECTED
TEM - STREPTOCOCCUS PYOGENES A: NOT DETECTED
TEM- HAEMOPHILUS INFLUENZAE B: NOT DETECTED
TEM- HAEMOPHILUS INFLUENZAE B: NOT DETECTED
TEM- HAEMOPHILUS INFLUENZAE: NOT DETECTED
TEM- HAEMOPHILUS INFLUENZAE: NOT DETECTED
WBC # BLD AUTO: 5.79 K/UL

## 2018-03-22 PROCEDURE — 25000003 PHARM REV CODE 250: Performed by: INTERNAL MEDICINE

## 2018-03-22 PROCEDURE — 63600175 PHARM REV CODE 636 W HCPCS: Performed by: INTERNAL MEDICINE

## 2018-03-22 PROCEDURE — 80048 BASIC METABOLIC PNL TOTAL CA: CPT

## 2018-03-22 PROCEDURE — 85025 COMPLETE CBC W/AUTO DIFF WBC: CPT

## 2018-03-22 PROCEDURE — 94761 N-INVAS EAR/PLS OXIMETRY MLT: CPT

## 2018-03-22 PROCEDURE — 83735 ASSAY OF MAGNESIUM: CPT

## 2018-03-22 PROCEDURE — 80197 ASSAY OF TACROLIMUS: CPT

## 2018-03-22 PROCEDURE — 25000003 PHARM REV CODE 250: Performed by: PHYSICIAN ASSISTANT

## 2018-03-22 RX ADMIN — TOPIRAMATE 25 MG: 25 TABLET, FILM COATED ORAL at 09:03

## 2018-03-22 RX ADMIN — VALGANCICLOVIR 900 MG: 450 TABLET, FILM COATED ORAL at 09:03

## 2018-03-22 RX ADMIN — TIZANIDINE 4 MG: 4 TABLET ORAL at 05:03

## 2018-03-22 RX ADMIN — MORPHINE SULFATE 15 MG: 15 TABLET, EXTENDED RELEASE ORAL at 09:03

## 2018-03-22 RX ADMIN — PANCRELIPASE 4 CAPSULE: 24000; 76000; 120000 CAPSULE, DELAYED RELEASE PELLETS ORAL at 08:03

## 2018-03-22 RX ADMIN — DIPHENHYDRAMINE HYDROCHLORIDE 50 MG: 50 INJECTION, SOLUTION INTRAMUSCULAR; INTRAVENOUS at 04:03

## 2018-03-22 RX ADMIN — FLUTICASONE PROPIONATE 100 MCG: 50 SPRAY, METERED NASAL at 09:03

## 2018-03-22 RX ADMIN — MAGNESIUM OXIDE TAB 400 MG (241.3 MG ELEMENTAL MG) 400 MG: 400 (241.3 MG) TAB at 09:03

## 2018-03-22 RX ADMIN — FOLIC ACID 1 MG: 1 TABLET ORAL at 09:03

## 2018-03-22 RX ADMIN — DAPSONE 100 MG: 100 TABLET ORAL at 09:03

## 2018-03-22 RX ADMIN — MONTELUKAST SODIUM 10 MG: 10 TABLET, FILM COATED ORAL at 09:03

## 2018-03-22 RX ADMIN — OXYCODONE HYDROCHLORIDE 10 MG: 5 TABLET ORAL at 05:03

## 2018-03-22 RX ADMIN — DIPHENHYDRAMINE HYDROCHLORIDE 50 MG: 50 INJECTION, SOLUTION INTRAMUSCULAR; INTRAVENOUS at 10:03

## 2018-03-22 RX ADMIN — GABAPENTIN 1100 MG: 400 CAPSULE ORAL at 09:03

## 2018-03-22 RX ADMIN — HEPARIN 500 UNITS: 100 SYRINGE at 11:03

## 2018-03-22 RX ADMIN — MEROPENEM 1 G: 1 INJECTION, POWDER, FOR SOLUTION INTRAVENOUS at 05:03

## 2018-03-22 RX ADMIN — SODIUM CHLORIDE 750 MG: 9 INJECTION, SOLUTION INTRAVENOUS at 04:03

## 2018-03-22 RX ADMIN — OYSTER SHELL CALCIUM WITH VITAMIN D 1 TABLET: 500; 200 TABLET, FILM COATED ORAL at 09:03

## 2018-03-22 RX ADMIN — PREDNISONE 5 MG: 5 TABLET ORAL at 09:03

## 2018-03-22 NOTE — PHYSICIAN QUERY
PT Name: Juanita Ibarra  MR #: [id number]     Physician Query Form - Documentation Clarification      CDS/: Nehal Gonzalez               Contact information: [email or web address]      This form is a permanent document in the medical record.     Query Date: March 22, 2018    By submitting this query, we are merely seeking further clarification of documentation. Please utilize your independent clinical judgment when addressing the question(s) below.    The Medical record reflects the following:    Supporting Clinical Findings Location in Medical Record     Hospital acquired PNA    Pa/lateral CXR from admission with RLL infiltrate, +crackles RLL on exam, Procalcitonin 0.16.   WBC - 4.94 today, remains afebrile, VSS, was placed on Merropenem/Vanc whilein [place] on 3/6 admission, received tobramycin x 1 3/18.      OR bronch 3/20 revealed stenotic surgical anastomosis with minor airway obstruction, as well as copious, tan/brown, thick secretions throughout the tracheobronchial tree. BAL results pending.  Continue Merropenem/Vanc, ID following, appreciate recs.   Continue daily labs.    Lung transplant status, bilateral    Received BLT on 6/12/2014 for cystic fibrosis.  Has known CARLOS EDUARDO grade 3.     Progress note 3/21                            Progress  Note 3/21                                                                            Doctor, Please specify diagnosis or diagnoses associated with above clinical findings.    Provider Use Only      [     ] Pneumonia is affecting the function of the Lung transplants   [    x ] Pneumonia is not affecting the function of the Lung transplants   [     ] Other:_____________________                                                                                                             [  ] Clinically undetermined

## 2018-03-22 NOTE — PROGRESS NOTES
Patient discharged instructions given.  Port heparin locked and patient removed port and needle intact.  Patient family member at bedside and awaiting transport.  Patient verbalizes understanding of discharge instructions and follow up appointments.

## 2018-03-22 NOTE — DISCHARGE SUMMARY
"[facility]  Lung Transplant  Discharge Summary      Patient Name: Juanita Ibarra  MRN: [id number]  Admission Date: 3/18/2018  Hospital Length of Stay: 4 days  Discharge Date and Time: 03/22/2018 10:57 AM  Attending Physician: Jake Alvarez MD   Discharging Provider: Shama Canales PA-C  Primary Care Provider: Jake Alvarez MD     HPI: Juanita Ibarra is a 27 y/o woman with a hx of BLT on 6/12/2014 for a diagnosis of cystic fibrosis.  Her post transplant course has been complicated by Candida Glabrata positive sputum, Pseudomonas PNA in 2015, CMV viremia in 7/2015, and CARLOS EDUARDO grade 3.  She was last seen in clinic in 1/2018.  Was doing well with stable dyspnea and not requiring any supplemental oxygen.     Since then, she presented to her PCP earlier this month for evaluation of sinusitis.  She states that with the cooler/rainy weather, she developed some sinus congestion.  She was not having a cough or dyspnea above her baseline at that time.  She was admitted to the hospital on 3/6/2018 for a 14 day course of IV abx.  During her hospital course, she reports having improved sinus congestion with Vanc/Merrem.  She felt like her sinuses, cough, and dyspnea were all back to baseline until hospital day 11 when she began having fevers, chills, and nightsweats.  It appears from records that her Tmax was 101.8F.  At that time, she also developed some increased dyspnea and a cough which is occasionally productive of white sputum.  She states that she "just doesn't feel right" and that the last time she felt like this was when she developed CMV viremia.  She has no known sick contacts but has been in an OSH for the last 11 days.       Regimen at the OSH included Vanc/Merrem and she was given a one time dose of Tobramycin on 3/17/2018.  Uncertain of any positive cultures or imaging results.      Procedure(s) (LRB):  BRONCHOSCOPY, BAL, BIOPSIES (N/A)     Hospital Course: Continued " Merrem/Vanc and tobramycin x 1 (3/18) for empiric coverage of hospital acquired pneumonia. Placed on empiric valganciclovir 900 mg BID for suspected CMV (D+/R- with hx of viremia). CT sinuses 3/18 without significant change, per ENT no active sinus infection. Respiratory culture, viral pathogens panel, CMV PCR, aspergillus ag, and fungitell negative. OR bronch 3/20 revealed stenotic surgical anastomosis with minor airway obstruction, as well as copious, tan/brown, thick secretions throughout the tracheobronchial tree, BAL negative. Tacrolimus was held on 3/21 for level >16, will resume upon d/c.     Patient's suspected PNA was likely aspiration 2/2 over sedation with narcotics and antihistamines, would recommend adjusting pain medication regimen outpatient to prevent future episodes of aspiration per pain management provider. Vanc/Merrem d/c'd yesterday due to resolution of PNA. CMV negative on 3/20 bronch, valgan d/c'd today. Plan to follow up in outpatient lung transplant clinic on 4/16/18 as scheduled.     * Hospital acquired PNA     Resolved.   Suspect patient aspirated due to over sedation, recommend decreasing pain regimen.        Lung transplant status, bilateral     Received BLT on 6/12/2014 for cystic fibrosis.  Has known CARLOS EDUARDO grade 3.  Continue immunosuppressives and prophylactic antibiotics.        Prophylactic antibiotic     Continue Dapsone 100mg daily as an outpatient.       Immunosuppression     Continue Tacrolimus 3.5mg BID, MMF 500mg BID, and Prednisone 5mg        Bronchiolitis obliterans syndrome     Has known CARLOS EDUARDO grade 3 but remains with good functional status and does not require supplemental oxygen.  No further interventions at this time.       Sinus infection     Resolved.        Hypertension     Continue Norvasc.        CF related Pancreatic insufficiency     Continue with pancreatic enzymes with meals.        Anxiety disorder     Continue home medications which include Abilify and Ativan.   Remeron nightly for sleep.       Chronic pain with opiate use     Will continue home medications which include Gabapentin 1100mg TID, MS Contin 15mg TID, Oxycodone 10mg prn q6 hours, Tizanidine 4mg.    Recommend decreasing sedating medications due to increased risk of aspiration.       Headache     Continue with Topamax.       Consults         Status Ordering Provider     Inpatient consult to ENT  Once     Provider:  (Not yet assigned)    Completed SARIKA SHERMAN     Inpatient consult to Infectious Diseases  Once     Provider:  (Not yet assigned)    Completed SARIKA SHERMAN          Significant Diagnostic Studies: Labs: All labs within the past 24 hours have been reviewed  Radiology: X-Ray: CXR: X-Ray Chest PA and Lateral (CXR):   Results for orders placed or performed during the hospital encounter of 03/18/18   X-Ray Chest PA And Lateral    Narrative    EXAMINATION:  XR CHEST PA AND LATERAL    CLINICAL HISTORY:  lung transplant;    TECHNIQUE:  PA and lateral views of the chest were performed.    COMPARISON:  Postoperative chest radiographs: 01/15/2018.  11/13/2017.  September 2014.    Preoperative chest CT: October 2013.    FINDINGS:  Port in the anterior right chest wall is attached to a catheter that extends to the SVC with its tip near the junction of SVC and right atrium.    Mediastinal structures are midline.  Mediastinal contours are stable compared with September 2014.  Sternal sutures are intact and aligned.    Lung volumes are normal and symmetric in this patient with mild scoliosis.  Taking both views into account I detect no acute pulmonary disease and no pleural fluid, lymph node enlargement, cardiac decompensation, pneumothorax, pneumomediastinum, pneumoperitoneum or significant osseous abnormality.      Impression    Stable radiographic appearance of the chest in this patient status post bilateral sequential lung transplant.      Electronically signed by: Adela Davalos  MD  Date:    03/19/2018  Time:    07:50         Pending Diagnostic Studies:     Procedure Component Value Units Date/Time    Histoplasma antigen, urine [[id number]] Collected:  03/19/18 1720    Order Status:  Sent Lab Status:  In process Updated:  03/19/18 1927    Specimen:  Urine from Urine, Clean Catch         Final Active Diagnoses:    Diagnosis Date Noted POA    PRINCIPAL PROBLEM:  Hospital acquired PNA [J18.9] 07/18/2015 Yes    Lung transplant status, bilateral [Z94.2] 06/14/2014 Not Applicable    Prophylactic antibiotic [Z79.2] 06/30/2014 Not Applicable    Immunosuppression [D89.9] 06/12/2014 Yes    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] 12/19/2016 Yes    Sinus infection [J32.9] 03/17/2018 Yes    Hypertension [I10] 03/19/2018 Yes    CF related Pancreatic insufficiency [K86.89] 12/20/2013 Yes    Anxiety disorder [F41.9] 10/08/2013 Yes    Chronic pain with opiate use [G89.29] 12/20/2013 Yes    Headache [R51] 11/13/2014 Yes      Problems Resolved During this Admission:    Diagnosis Date Noted Date Resolved POA      Discharged Condition: stable    Disposition: Home or Self Care    Medications:  Reconciled Home Medications:   Current Discharge Medication List      CONTINUE these medications which have NOT CHANGED    Details   amlodipine (NORVASC) 5 MG tablet Take 5 mg by mouth once daily.      aripiprazole (ABILIFY) 2 MG Tab Take 2 mg by mouth once daily.      butalbital-acetaminophen-caffeine -40 mg (FIORICET, ESGIC) -40 mg per tablet TAKE 1 TABLET BY MOUTH EVERY 6 HOURS AS NEEDED FOR HEADACHE  Qty: 120 tablet, Refills: 0      calcium-vitamin D3 500 mg(1,250mg) -200 unit per tablet Take 1 tablet by mouth 2 (two) times daily with meals.  Qty: 60 tablet, Refills: 11    Associated Diagnoses: Osteopenia      CREON 24,000-76,000 -120,000 unit capsule TAKE 5 CAPSULES BY MOUTH WITH MEALS AND 4 CAPSULES WITH SNACKS EVERYDAY  Qty: 810 capsule, Refills: 0    Associated Diagnoses: Cystic fibrosis with  gastrointestinal manifestations      dapsone 100 MG Tab TAKE 1 TABLET BY MOUTH EVERY DAY  Qty: 30 tablet, Refills: 11    Associated Diagnoses: Need for pneumocystis prophylaxis      diphenhydrAMINE (BENADRYL) 50 MG tablet Take 1 tablet (50 mg total) by mouth every 6 (six) hours as needed for Itching.  Qty: 1 tablet, Refills: 0      !! DULERA 100-5 mcg/actuation HFAA INHALE 1 PUFF BY MOUTH TWICE DAILY  Qty: 13 g, Refills: 0    Associated Diagnoses: Reactive airway disease, mild intermittent, uncomplicated      !! DULERA 100-5 mcg/actuation HFAA INHALE 1 PUFF BY MOUTH TWICE DAILY  Qty: 13 g, Refills: 0    Associated Diagnoses: Reactive airway disease, mild intermittent, uncomplicated      fexofenadine (ALLEGRA) 180 MG tablet Take 180 mg by mouth once daily.      fluconazole (DIFLUCAN) 150 MG Tab TAKE 1 TABLET BY MOUTH EVERY WEEK  Qty: 30 tablet, Refills: 0    Associated Diagnoses: Vaginal yeast infection      fluticasone (FLONASE) 50 mcg/actuation nasal spray INSERT 2 SPRAYS IN EACH NOSTRIL DAILY  Qty: 16 g, Refills: 5      folic acid (FOLVITE) 1 MG tablet Take 1 tablet (1,000 mcg total) by mouth once daily.  Qty: 30 tablet, Refills: 11    Associated Diagnoses: Folate deficiency      gabapentin (NEURONTIN) 300 MG capsule Take 1 capsule (300 mg total) by mouth 3 (three) times daily.  Qty: 90 capsule, Refills: 11    Associated Diagnoses: Chronic pain syndrome; Muscle spasm of back      insulin glargine, TOUJEO, (TOUJEO) 300 unit/mL (1.5 mL) InPn pen Inject 3 Units into the skin once daily.       levalbuterol (XOPENEX HFA) 45 mcg/actuation inhaler INHALE 1 TO 2 PUFFS INTO THE LUNGS EVERY 6 HOURS AS NEEDED FOR WHEEZING OR SHORTNESS OF BREATH. USE WITH SPACER.  Qty: 15 g, Refills: 0    Associated Diagnoses: Wheezing      magnesium oxide (MAG-OX) 400 mg tablet Take 1 tablet (400 mg total) by mouth 2 (two) times daily.  Qty: 60 tablet, Refills: 11    Associated Diagnoses: Hypomagnesemia      mirtazapine (REMERON) 30 MG  tablet Take 30 mg by mouth every evening.      montelukast (SINGULAIR) 10 mg tablet TAKE 1 TABLET BY MOUTH EVERY DAY  Qty: 30 tablet, Refills: 11    Associated Diagnoses: Allergic rhinitis      morphine (MS CONTIN) 15 MG 12 hr tablet Take 15 mg by mouth 3 (three) times daily.      mv. min cmb#[id number] (AQUADEKS) 100-700-10 mcg-mcg-mg Cap cap Take 1 capsule by mouth 2 (two) times daily.  Qty: 60 capsule, Refills: 11    Associated Diagnoses: Vitamin deficiency      mycophenolate (CELLCEPT) 250 mg Cap Take 2 capsules (500 mg total) by mouth 2 (two) times daily.  Qty: 120 capsule, Refills: 11    Associated Diagnoses: Lung replaced by transplant      omeprazole (PRILOSEC) 40 MG capsule TAKE 1 CAPSULE BY MOUTH EVERY MORNING  Qty: 30 capsule, Refills: 11    Associated Diagnoses: Gastroesophageal reflux disease without esophagitis      !! ondansetron (ZOFRAN) 8 MG tablet Take 1 tablet (8 mg total) by mouth every 8 (eight) hours as needed.  Qty: 30 tablet, Refills: 1    Associated Diagnoses: Nausea      !! ondansetron (ZOFRAN) 8 MG tablet TAKE 1 TABLET(8 MG) BY MOUTH EVERY 8 HOURS AS NEEDED  Qty: 30 tablet, Refills: 0    Associated Diagnoses: Nausea      oxycodone (ROXICODONE) 5 MG immediate release tablet Take 10 mg by mouth every 4 (four) hours as needed for Pain.      polyethylene glycol (GLYCOLAX) 17 gram PwPk Take 17 g by mouth 2 (two) times daily.  Qty: 60 packet, Refills: 11      predniSONE (DELTASONE) 5 MG tablet Take 5 mg by mouth once daily.      PROCHAMBER USE AS DIRECTED  Qty: 1 Device, Refills: 0    Associated Diagnoses: Uses nebulizer and inhaler at home      promethazine (PHENERGAN) 25 MG tablet TAKE 1 TABLET BY MOUTH EVERY 8 HOURS AS NEEDED FOR NAUSEA  Qty: 45 tablet, Refills: 0      !! tacrolimus (PROGRAF) 0.5 MG Cap Take 1 capsule (0.5 mg total) by mouth every 12 (twelve) hours.  Qty: 60 capsule, Refills: 11    Associated Diagnoses: Lung replaced by transplant      !! tacrolimus (PROGRAF) 1 MG Cap Take 3  capsules (3 mg total) by mouth every 12 (twelve) hours. Daily doses: 3.5 mg every 12 hours.  Qty: 180 capsule, Refills: 11    Associated Diagnoses: Lung replaced by transplant      tiZANidine (ZANAFLEX) 4 MG tablet TAKE 2 TABLETS BY MOUTH EVERY 6 HOURS AS NEEDED  Qty: 90 tablet, Refills: 0       !! - Potential duplicate medications found. Please discuss with provider.      STOP taking these medications       aluminum-magnesium hydroxide-simethicone (MAALOX ADVANCED) 200-200-20 mg/5 mL Susp Comments:   Reason for Stopping:         lorazepam (ATIVAN) 2 MG Tab Comments:   Reason for Stopping:           \  Patient Instructions:     Diet Adult Regular     Activity as tolerated     Notify your health care provider if you experience any of the following:     Notify your health care provider if you experience any of the following:  increased confusion or weakness     Notify your health care provider if you experience any of the following:  persistent dizziness, light-headedness, or visual disturbances     Notify your health care provider if you experience any of the following:  worsening rash     Notify your health care provider if you experience any of the following:  severe persistent headache     Notify your health care provider if you experience any of the following:  difficulty breathing or increased cough     Notify your health care provider if you experience any of the following:  redness, tenderness, or signs of infection (pain, swelling, redness, odor or green/yellow discharge around incision site)     Notify your health care provider if you experience any of the following:  severe uncontrolled pain     Notify your health care provider if you experience any of the following:  persistent nausea and vomiting or diarrhea     Notify your health care provider if you experience any of the following:  temperature >100.4       Follow Up:  Follow-up Information     Jake Alvarez MD On 4/16/2018.    Specialty:   Transplant  Why:  Routine follow up s/p lung transplant  Contact information:  [address]  [facility] [id number]  [phone]                   Shama Canales PA-C  Lung Transplant  [facility]

## 2018-03-22 NOTE — PROGRESS NOTES
Discharge Note:  SW contacted pt via telephone in order to review discharge planning. Pt sounded AAOx4 and reports that she is coping well today. Pt reports that her [facility] was negative for any infection and pt able to return home today. Pt reports that her boyfriend's mother Marylin Castrejon will be transporting pt back to her home in [place], [place]. Pt does not have any HH, DME or infusion needs at this time. SW provided space for pt to ask questions/voice concerns. Pt denied any further needs. SW remains available for continued psychosocial support, education, resources, and additional d/c planning as needed.

## 2018-03-22 NOTE — PROGRESS NOTES
Patient being discharged home today.  Patient will follow up in the lung transplant clinic with labs, chest x-ray, PFT and doctor visit on Monday, April 16, 2018 as currently scheduled.  Patient verbalized that she will follow up with her ENT provider Dr. Ghotra regarding results of her sinus cultures, and with her local provider in [place] for a referral to speech therapy due to potential aspiration.  She denied having any needs at this time and demonstrated her readiness for discharge home.

## 2018-03-23 LAB — BACTERIA SPEC AEROBE CULT: NO GROWTH

## 2018-03-26 LAB — BACTERIA SPEC ANAEROBE CULT: NORMAL

## 2018-03-27 ENCOUNTER — DOCUMENTATION ONLY (OUTPATIENT)
Dept: OTOLARYNGOLOGY | Facility: CLINIC | Age: 28
End: 2018-03-27

## 2018-03-28 NOTE — PROGRESS NOTES
Inpatient sinus cultures showed no growth.  Will reinstitute compounded sinus rinse with tobramycin-budesonide based on prior cultures.  New Rx sent to [facility].

## 2018-04-04 RX ORDER — PROMETHAZINE HYDROCHLORIDE 25 MG/1
TABLET ORAL
Qty: 45 TABLET | Refills: 0 | Status: SHIPPED | OUTPATIENT
Start: 2018-04-04 | End: 2018-06-25 | Stop reason: SDUPTHER

## 2018-04-10 ENCOUNTER — TELEPHONE (OUTPATIENT)
Dept: TRANSPLANT | Facility: CLINIC | Age: 28
End: 2018-04-10

## 2018-04-10 NOTE — TELEPHONE ENCOUNTER
Patient called to request that her upcoming follow up appt be rescheduled. Contacted by DARCI Williamson MA/ - appt rescheduled from 4/16/18 to 4/23/18 per patient's request.

## 2018-04-10 NOTE — TELEPHONE ENCOUNTER
----- Message from Apolonia Machuca sent at 4/10/2018  3:39 PM CDT -----  Contact: pt  Calling to resched appt from Monday    Pt contact [phone]

## 2018-04-18 ENCOUNTER — PATIENT MESSAGE (OUTPATIENT)
Dept: TRANSPLANT | Facility: CLINIC | Age: 28
End: 2018-04-18

## 2018-04-18 DIAGNOSIS — Z94.2 LUNG REPLACED BY TRANSPLANT: ICD-10-CM

## 2018-04-18 RX ORDER — TACROLIMUS 0.5 MG/1
0.5 CAPSULE ORAL EVERY 12 HOURS
Qty: 60 CAPSULE | Refills: 11 | Status: SHIPPED | OUTPATIENT
Start: 2018-04-18 | End: 2018-01-01 | Stop reason: DRUGHIGH

## 2018-04-18 NOTE — TELEPHONE ENCOUNTER
Patient messaged requesting a refill on Prograf 0.5 mg and to send to [facility] pharmacy in [place].  Rx entered and sent to Dr. Morin for review and approval.

## 2018-04-19 DIAGNOSIS — R06.2 WHEEZING: ICD-10-CM

## 2018-04-19 RX ORDER — LEVALBUTEROL TARTRATE 45 UG/1
AEROSOL, METERED ORAL
Qty: 15 G | Refills: 0 | Status: SHIPPED | OUTPATIENT
Start: 2018-04-19 | End: 2018-04-23 | Stop reason: SDUPTHER

## 2018-04-23 ENCOUNTER — HOSPITAL ENCOUNTER (OUTPATIENT)
Dept: PULMONOLOGY | Facility: CLINIC | Age: 28
Discharge: HOME | End: 2018-04-23
Payer: MEDICAID

## 2018-04-23 ENCOUNTER — OFFICE VISIT (OUTPATIENT)
Dept: TRANSPLANT | Facility: CLINIC | Age: 28
End: 2018-04-23
Payer: MEDICAID

## 2018-04-23 ENCOUNTER — HOSPITAL ENCOUNTER (OUTPATIENT)
Dept: RADIOLOGY | Facility: HOSPITAL | Age: 28
Discharge: HOME | End: 2018-04-23
Attending: INTERNAL MEDICINE
Payer: MEDICAID

## 2018-04-23 VITALS
TEMPERATURE: 97 F | WEIGHT: 126 LBS | BODY MASS INDEX: 23.79 KG/M2 | RESPIRATION RATE: 20 BRPM | HEART RATE: 93 BPM | OXYGEN SATURATION: 100 % | HEIGHT: 61 IN | SYSTOLIC BLOOD PRESSURE: 116 MMHG | DIASTOLIC BLOOD PRESSURE: 73 MMHG

## 2018-04-23 DIAGNOSIS — E84.19 CYSTIC FIBROSIS WITH GASTROINTESTINAL MANIFESTATIONS: ICD-10-CM

## 2018-04-23 DIAGNOSIS — Z94.2 LUNG REPLACED BY TRANSPLANT: ICD-10-CM

## 2018-04-23 DIAGNOSIS — Z48.24 ENCOUNTER FOR AFTERCARE FOLLOWING LUNG TRANSPLANT: Primary | ICD-10-CM

## 2018-04-23 DIAGNOSIS — D84.9 IMMUNOSUPPRESSION: ICD-10-CM

## 2018-04-23 DIAGNOSIS — Z79.2 PROPHYLACTIC ANTIBIOTIC: ICD-10-CM

## 2018-04-23 DIAGNOSIS — J44.81 BRONCHIOLITIS OBLITERANS: ICD-10-CM

## 2018-04-23 LAB
PRE FEV1 FVC: 40
PRE FEV1: 0.88
PRE FVC: 2.21
PREDICTED FEV1 FVC: 88
PREDICTED FEV1: 2.8
PREDICTED FVC: 3.22

## 2018-04-23 PROCEDURE — 99214 OFFICE O/P EST MOD 30 MIN: CPT | Mod: 25,S$PBB,, | Performed by: INTERNAL MEDICINE

## 2018-04-23 PROCEDURE — 71046 X-RAY EXAM CHEST 2 VIEWS: CPT | Mod: 26,,, | Performed by: RADIOLOGY

## 2018-04-23 PROCEDURE — 99999 PR PBB SHADOW E&M-EST. PATIENT-LVL III: CPT | Mod: PBBFAC,,, | Performed by: INTERNAL MEDICINE

## 2018-04-23 PROCEDURE — 94010 BREATHING CAPACITY TEST: CPT | Mod: PBBFAC | Performed by: INTERNAL MEDICINE

## 2018-04-23 PROCEDURE — 94010 BREATHING CAPACITY TEST: CPT | Mod: 26,S$PBB,, | Performed by: INTERNAL MEDICINE

## 2018-04-23 PROCEDURE — 71046 X-RAY EXAM CHEST 2 VIEWS: CPT | Mod: TC,FY

## 2018-04-23 PROCEDURE — 99213 OFFICE O/P EST LOW 20 MIN: CPT | Mod: PBBFAC,25 | Performed by: INTERNAL MEDICINE

## 2018-04-23 RX ORDER — LISINOPRIL 10 MG/1
10 TABLET ORAL DAILY
Status: ON HOLD | COMMUNITY
End: 2018-01-01 | Stop reason: HOSPADM

## 2018-04-23 RX ORDER — LORAZEPAM 1 MG/1
2 TABLET ORAL EVERY 6 HOURS PRN
COMMUNITY
End: 2019-01-01 | Stop reason: SDUPTHER

## 2018-04-23 NOTE — PROGRESS NOTES
LUNG TRANSPLANT RECIPIENT FOLLOW-UP    Reason for Visit: Follow-up after lung transplantation.      Date of Transplant: 6/12/14     Reason for Transplant: Cystic fibrosis      Type of Transplant: bilateral sequential lung     CMV Status: D+ / R-      Major Complications:   1. Vocal cord dysfunction- resolved   2. A1 Rejection 8/2014   3. C glabrata positive sputum-recurrent  4. Pseudomonas pneumonia in 02/2015 resolved   5. 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 CARLOS EDUARDO (grade 3)                                                                               History of Present Illness: Juanita Ibarra is a 28 y.o. year old female with the above listed transplant history who presents today for post-hospital follow up visit. She was admitted last month for pneumonia, possible related to aspiration.  Since her discharge, she has been doing well.  Her dyspnea remains stable and present only on heavy exertion.  She does not require supplemental oxygen.  Denies any recent illnesses or sick contacts, and has no cough or fevers.  She is complaining of occasional diarrhea, but otherwise feels well.  She will be getting  soon and will be going on a cruise for her Functional [employer].        Review of Systems   Constitutional: Negative for chills, diaphoresis, fever, malaise/fatigue and weight loss.        Weight gain   HENT: Negative for congestion, ear discharge, ear pain, hearing loss, nosebleeds and sore throat.    Eyes: Negative for blurred vision, double vision, photophobia and pain.   Respiratory: Negative for cough, hemoptysis, sputum production, shortness of breath, wheezing and stridor.    Cardiovascular: Negative for chest pain, palpitations, orthopnea, claudication, leg swelling and PND.   Gastrointestinal: Positive for diarrhea (intermittent). Negative for abdominal pain, blood in stool, constipation, heartburn, melena, nausea and vomiting.   Genitourinary: Negative for dysuria, flank pain, frequency, hematuria and urgency.  "  Musculoskeletal: Negative for back pain, falls, joint pain, myalgias and neck pain.   Skin: Negative for itching and rash.   Neurological: Negative for dizziness, tingling, tremors, sensory change, focal weakness, seizures, loss of consciousness, weakness and headaches.   Endo/Heme/Allergies: Does not bruise/bleed easily.   Psychiatric/Behavioral: Negative for depression, hallucinations, memory loss and suicidal ideas. The patient is not nervous/anxious and does not have insomnia.      /73   Pulse 93   Temp 97.1 °F (36.2 °C) (Oral)   Resp 20   Ht 5' 1" (1.549 m)   Wt 57.2 kg (126 lb)   LMP 10/02/2016   SpO2 100%   BMI 23.81 kg/m²     Physical Exam   Constitutional: She is oriented to person, place, and time and well-developed, well-nourished, and in no distress. No distress.   HENT:   Head: Normocephalic and atraumatic.   Nose: Nose normal.   Mouth/Throat: Oropharynx is clear and moist. No oropharyngeal exudate.   Nasal voice   Eyes: Conjunctivae and EOM are normal. Pupils are equal, round, and reactive to light. No scleral icterus.   Neck: Normal range of motion. Neck supple. No JVD present. No tracheal deviation present. No thyromegaly present.   Cardiovascular: Normal rate, regular rhythm and intact distal pulses.  Exam reveals no gallop and no friction rub.    No murmur heard.  Pulmonary/Chest: Effort normal and breath sounds normal. No stridor. No respiratory distress. She has no wheezes. She has no rales. She exhibits no tenderness.   Abdominal: Soft. Bowel sounds are normal. She exhibits no distension and no mass. There is no tenderness. There is no rebound and no guarding.   Musculoskeletal: Normal range of motion. She exhibits no edema.   Lymphadenopathy:     She has no cervical adenopathy.   Neurological: She is alert and oriented to person, place, and time. No cranial nerve deficit. Gait normal. Coordination normal. GCS score is 15.   Skin: Skin is warm and dry. No rash noted. She is not " diaphoretic. No erythema. No pallor.   Psychiatric: Mood and affect normal.     Labs:  cbc, cmp, tacrolimus Latest Ref Rng & Units 3/21/2018 3/22/2018 4/23/2018   TACROLIMUS LVL 5.0 - 15.0 ng/mL 16.2(H) 7.3 -   WHITE BLOOD CELL COUNT 3.90 - 12.70 K/uL 6.33 5.79 8.76   RBC 4.00 - 5.40 M/uL 3.26(L) 3.00(L) 3.92(L)   HEMOGLOBIN 12.0 - 16.0 g/dL 9.2(L) 8.5(L) 10.5(L)   HEMATOCRIT 37.0 - 48.5 % 30.8(L) 28.4(L) 34.9(L)   MCV 82 - 98 fL 95 95 89   MCH 27.0 - 31.0 pg 28.2 28.3 26.8(L)   MCHC 32.0 - 36.0 g/dL 29.9(L) 29.9(L) 30.1(L)   RDW 11.5 - 14.5 % 13.8 14.3 15.1(H)   PLATELETS 150 - 350 K/uL 205 162 180   MPV 9.2 - 12.9 fL 10.3 10.3 11.8   GRAN # 1.8 - 7.7 K/uL 3.4 3.2 5.0   LYMPH # 1.0 - 4.8 K/uL 2.2 2.1 2.9   MONO # 0.3 - 1.0 K/uL 0.5 0.4 0.6   EOSINOPHIL% 0.0 - 8.0 % 2.2 2.2 2.7   BASOPHIL% 0.0 - 1.9 % 0.3 0.7 0.5   DIFFERENTIAL METHOD - Automated Automated Automated   SODIUM 136 - 145 mmol/L 142 142 139   POTASSIUM 3.5 - 5.1 mmol/L 4.2 3.9 4.0   CHLORIDE 95 - 110 mmol/L 105 107 108   CO2 23 - 29 mmol/L 28 28 21(L)   GLUCOSE 70 - 110 mg/dL 112(H) 109 106   BUN BLD 6 - 20 mg/dL 16 14 13   CREATININE 0.5 - 1.4 mg/dL 1.1 1.0 1.0   CALCIUM 8.7 - 10.5 mg/dL 9.2 8.8 9.1   PROTEIN TOTAL 6.0 - 8.4 g/dL - - 7.2   ALBUMIN 3.5 - 5.2 g/dL - - 3.9   BILIRUBIN TOTAL 0.1 - 1.0 mg/dL - - 0.3   ALK PHOS 55 - 135 U/L - - 111   AST 10 - 40 U/L - - 14   ALT 10 - 44 U/L - - 12   ANION GAP 8 - 16 mmol/L 9 7(L) 10   EGFR IF AFRICAN AMERICAN >60 mL/min/1.73 m:2 >60.0 >60.0 >60.0   EGFR IF NON-AFRICAN AMERICAN >60 mL/min/1.73 m:2 >60.0 >60.0 >60.0     Pulmonary Function Tests 4/23/2018 1/15/2018 11/13/2017 9/25/2017 7/25/2017 6/29/2017 3/30/2017   FVC 2.21 2.2 2.03 2.3 2.36 2.2 2.35   FEV1 0.88 0.9 0.84 0.88 0.94 0.88 0.93   TLC (liters) - - - - - - -   DLCO (ml/mmHg sec) - - - - - - -   FVC% 69 68 63 71 73 68 73   FEV1% 31 32 30 31 33 31 33   FEF 25-75 0.36 0.39 0.33 0.35 0.36 0.36 0.33   FEF 25-75% 11 12 10 10 11 11 10   TLC% - - - - - - -    RV - - - - - - -   RV% - - - - - - -   DLCO% - - - - - - -       Imaging:  Results for orders placed during the hospital encounter of 04/23/18   X-Ray Chest PA And Lateral    Narrative EXAMINATION:  XR CHEST PA AND LATERAL    CLINICAL HISTORY:  Lung transplant status    FINDINGS:  Central line mid SVC.  Heart size is normal.  Lungs are clear.  There is postoperative change.      Impression See above    No acute process seen.      Electronically signed by: Christos Deleon MD  Date:    04/23/2018  Time:    08:54         Assessment:  1. Encounter for aftercare following lung transplant    2. Lung replaced by transplant    3. Immunosuppression    4. Prophylactic antibiotic    5. Bronchiolitis obliterans    6. Cystic fibrosis with gastrointestinal manifestations      Plan:   1. She seems to have recovered from her recent hospital admit for pneumonia.  She has CARLOS EDUARDO grade 3 which has been stable since last visit.  Her FEV1 and symptoms remain stable.  CXR with hyperinflation but otherwise no acute changes.  She does not require supplemental oxygen.  Will continue to monitor at regularly scheduled visits.      2. Continue Prednisone, Tacrolimus, and Mycophenolate.  Labs reviewed.  Will follow up Tacrolimus level and adjust if needed.    3. Continue with Dapsone.    4. Continue with pancreatic enzymes and insulin.  Follows with primary care.  She can increase her Creon to 36,000 units.    5. Follow-up in 3 months or earlier if needed.    Reyes Will NP  Lung Transplant  [id number]

## 2018-04-24 LAB
FUNGUS SPEC CULT: NORMAL
FUNGUS SPEC CULT: NORMAL

## 2018-04-30 ENCOUNTER — PATIENT MESSAGE (OUTPATIENT)
Dept: TRANSPLANT | Facility: CLINIC | Age: 28
End: 2018-04-30

## 2018-04-30 DIAGNOSIS — Z94.2 LUNG REPLACED BY TRANSPLANT: ICD-10-CM

## 2018-04-30 DIAGNOSIS — E83.42 HYPOMAGNESEMIA: ICD-10-CM

## 2018-04-30 RX ORDER — LANOLIN ALCOHOL/MO/W.PET/CERES
400 CREAM (GRAM) TOPICAL 2 TIMES DAILY
Qty: 60 TABLET | Refills: 11 | Status: SHIPPED | OUTPATIENT
Start: 2018-04-30 | End: 2019-01-01 | Stop reason: SDUPTHER

## 2018-04-30 RX ORDER — MYCOPHENOLATE MOFETIL 250 MG/1
500 CAPSULE ORAL 2 TIMES DAILY
Qty: 120 CAPSULE | Refills: 11 | Status: ON HOLD | OUTPATIENT
Start: 2018-04-30 | End: 2018-01-01 | Stop reason: SDUPTHER

## 2018-04-30 NOTE — TELEPHONE ENCOUNTER
Received refill request from patient on Cellcept and magnesium. Rx entered and sent to Dr. Morin for review and approval.

## 2018-05-01 ENCOUNTER — PATIENT MESSAGE (OUTPATIENT)
Dept: TRANSPLANT | Facility: CLINIC | Age: 28
End: 2018-05-01

## 2018-05-03 DIAGNOSIS — R11.0 NAUSEA: ICD-10-CM

## 2018-05-03 DIAGNOSIS — E84.19 CYSTIC FIBROSIS WITH GASTROINTESTINAL MANIFESTATIONS: ICD-10-CM

## 2018-05-03 RX ORDER — TIZANIDINE 4 MG/1
TABLET ORAL
Qty: 90 TABLET | Refills: 0 | Status: SHIPPED | OUTPATIENT
Start: 2018-05-03 | End: 2018-07-07 | Stop reason: SDUPTHER

## 2018-05-03 RX ORDER — ONDANSETRON HYDROCHLORIDE 8 MG/1
TABLET, FILM COATED ORAL
Qty: 30 TABLET | Refills: 0 | Status: SHIPPED | OUTPATIENT
Start: 2018-05-03 | End: 2018-06-25 | Stop reason: SDUPTHER

## 2018-05-04 RX ORDER — PANCRELIPASE 24000; 76000; 120000 [USP'U]/1; [USP'U]/1; [USP'U]/1
CAPSULE, DELAYED RELEASE PELLETS ORAL
Qty: 810 CAPSULE | Refills: 0 | Status: SHIPPED | OUTPATIENT
Start: 2018-05-04 | End: 2018-06-18

## 2018-05-22 ENCOUNTER — TELEPHONE (OUTPATIENT)
Dept: TRANSPLANT | Facility: CLINIC | Age: 28
End: 2018-05-22

## 2018-05-22 LAB
ACID FAST MOD KINY STN SPEC: NORMAL
MYCOBACTERIUM SPEC QL CULT: NORMAL

## 2018-05-22 NOTE — TELEPHONE ENCOUNTER
Received instructions from Dr. Alvarez to contact patient for an update on pt's ED admission in [place].  Pt contacted and informed us that she went to the ED last night for sudden SOB satting in the mid to high 80s.  Patient also c/o a cough and sinus congestion that started about a day ago.  Pt recently went on a cruise and returned last Monday 5/14/18.  Patient informed us that she is now satting in the low 90s and received one dose of Vancomycin in the hospital and that they were planning on discharge today. Informed patient that coordinator will contact her once report is given to Dr. Alvarez.     Contacted Dr. Alvarez and notified him of the above information that was received from the patient.  Dr. Alvarez is convinced that pt may have contracted some sort of respiratory infection from her cruise. Dr. Alvarez recommends that we closely monitor the patient and that discharge today was fine.     Contacted pt and informed her that Dr. Alvarez stated since she will be discharged to today that we will keep a close eye on her and she is to report any changes or worsening in symptoms.  Patient verbalized understanding and did not have any further questions at this time.

## 2018-05-23 ENCOUNTER — TELEPHONE (OUTPATIENT)
Dept: TRANSPLANT | Facility: CLINIC | Age: 28
End: 2018-05-23

## 2018-05-23 NOTE — TELEPHONE ENCOUNTER
"Returned call to the patient who stated she was instructed to let us know if her condition worsened - patient had been hospitalized in [place] and d/c'd on 5/22/18 for URI/LRI s/s following her return from a cruise on 5/14/18. At the time of this call the patient was in the office of her PCP and just had a pulse ox check - "97%". Patient stated "I don't feel like my oxygen is at 97%. It has been low in the "80's" when she checked at home with her own device. Stated that she still feels chest congestion and is still more sob. PCP has not completed her evaluation but patient stated "my PCP wants me admitted somewhere". Patient does not have dyspnea during conversation, remains afebrile. Instructed the patient to ask her PCP to page Dr. Alvarez to discuss her concerns [phone]. Patient verbalized her understanding.  "

## 2018-05-23 NOTE — TELEPHONE ENCOUNTER
----- Message from Apolonia Machuca sent at 5/23/2018 10:40 AM CDT -----  Contact: pt  Needs Medical Advice    Who Called: pt  Symptoms (please be specific):    How long has patient had these symptoms:    Pharmacy name and phone # if needed:    Communication Preference (Aileen response to Pt. (or) Call Back # and timeframe): [phone]  Additional Information: calling to speak with coordinator regarding being admitted to hospital

## 2018-05-24 PROBLEM — R09.02 HYPOXIA: Status: ACTIVE | Noted: 2018-05-24

## 2018-05-25 ENCOUNTER — HOSPITAL ENCOUNTER (INPATIENT)
Facility: HOSPITAL | Age: 28
LOS: 6 days | Discharge: HOME | DRG: 166 | End: 2018-05-31
Attending: INTERNAL MEDICINE | Admitting: INTERNAL MEDICINE
Payer: MEDICAID

## 2018-05-25 DIAGNOSIS — J18.9 PNEUMONIA OF RIGHT LOWER LOBE DUE TO INFECTIOUS ORGANISM: ICD-10-CM

## 2018-05-25 DIAGNOSIS — R09.02 HYPOXIA: ICD-10-CM

## 2018-05-25 DIAGNOSIS — D84.9 IMMUNOSUPPRESSION: ICD-10-CM

## 2018-05-25 DIAGNOSIS — H69.93 ETD (EUSTACHIAN TUBE DYSFUNCTION), BILATERAL: ICD-10-CM

## 2018-05-25 DIAGNOSIS — R07.9 CHEST PAIN: ICD-10-CM

## 2018-05-25 DIAGNOSIS — Z94.2 LUNG TRANSPLANT STATUS, BILATERAL: Primary | ICD-10-CM

## 2018-05-25 DIAGNOSIS — G89.4 CHRONIC PAIN SYNDROME: ICD-10-CM

## 2018-05-25 DIAGNOSIS — J18.9 PNEUMONIA: ICD-10-CM

## 2018-05-25 PROBLEM — Z79.52 CURRENT CHRONIC USE OF SYSTEMIC STEROIDS: Status: ACTIVE | Noted: 2018-05-25

## 2018-05-25 LAB
ALBUMIN SERPL BCP-MCNC: 3.5 G/DL
ALP SERPL-CCNC: 138 U/L
ALT SERPL W/O P-5'-P-CCNC: 20 U/L
ANION GAP SERPL CALC-SCNC: 8 MMOL/L
AST SERPL-CCNC: 17 U/L
BASOPHILS # BLD AUTO: 0.01 K/UL
BASOPHILS NFR BLD: 0.2 %
BILIRUB SERPL-MCNC: 0.4 MG/DL
BUN SERPL-MCNC: 11 MG/DL
CALCIUM SERPL-MCNC: 9.3 MG/DL
CHLORIDE SERPL-SCNC: 108 MMOL/L
CO2 SERPL-SCNC: 25 MMOL/L
CREAT SERPL-MCNC: 1 MG/DL
DIFFERENTIAL METHOD: ABNORMAL
EOSINOPHIL # BLD AUTO: 0.1 K/UL
EOSINOPHIL NFR BLD: 2.1 %
ERYTHROCYTE [DISTWIDTH] IN BLOOD BY AUTOMATED COUNT: 16.8 %
EST. GFR  (AFRICAN AMERICAN): >60 ML/MIN/1.73 M^2
EST. GFR  (NON AFRICAN AMERICAN): >60 ML/MIN/1.73 M^2
GLUCOSE SERPL-MCNC: 116 MG/DL
HCT VFR BLD AUTO: 28.6 %
HGB BLD-MCNC: 8.1 G/DL
IMM GRANULOCYTES # BLD AUTO: 0.02 K/UL
IMM GRANULOCYTES NFR BLD AUTO: 0.3 %
LYMPHOCYTES # BLD AUTO: 1.9 K/UL
LYMPHOCYTES NFR BLD: 30.5 %
MAGNESIUM SERPL-MCNC: 1.6 MG/DL
MCH RBC QN AUTO: 26.3 PG
MCHC RBC AUTO-ENTMCNC: 28.3 G/DL
MCV RBC AUTO: 93 FL
MONOCYTES # BLD AUTO: 0.4 K/UL
MONOCYTES NFR BLD: 6.4 %
NEUTROPHILS # BLD AUTO: 3.7 K/UL
NEUTROPHILS NFR BLD: 60.5 %
NRBC BLD-RTO: 0 /100 WBC
PLATELET # BLD AUTO: 138 K/UL
PMV BLD AUTO: 12.1 FL
POTASSIUM SERPL-SCNC: 4.4 MMOL/L
PROCALCITONIN SERPL IA-MCNC: <0.02 NG/ML
PROT SERPL-MCNC: 6.3 G/DL
RBC # BLD AUTO: 3.08 M/UL
SODIUM SERPL-SCNC: 141 MMOL/L
WBC # BLD AUTO: 6.14 K/UL

## 2018-05-25 PROCEDURE — 87040 BLOOD CULTURE FOR BACTERIA: CPT

## 2018-05-25 PROCEDURE — 20600001 HC STEP DOWN PRIVATE ROOM

## 2018-05-25 PROCEDURE — 63600175 PHARM REV CODE 636 W HCPCS: Performed by: PHYSICIAN ASSISTANT

## 2018-05-25 PROCEDURE — 87449 NOS EACH ORGANISM AG IA: CPT

## 2018-05-25 PROCEDURE — 25000003 PHARM REV CODE 250: Performed by: STUDENT IN AN ORGANIZED HEALTH CARE EDUCATION/TRAINING PROGRAM

## 2018-05-25 PROCEDURE — 99223 1ST HOSP IP/OBS HIGH 75: CPT | Mod: AI,,, | Performed by: PHYSICIAN ASSISTANT

## 2018-05-25 PROCEDURE — 80197 ASSAY OF TACROLIMUS: CPT

## 2018-05-25 PROCEDURE — 85025 COMPLETE CBC W/AUTO DIFF WBC: CPT

## 2018-05-25 PROCEDURE — 27000221 HC OXYGEN, UP TO 24 HOURS

## 2018-05-25 PROCEDURE — 25000003 PHARM REV CODE 250: Performed by: PHYSICIAN ASSISTANT

## 2018-05-25 PROCEDURE — 83735 ASSAY OF MAGNESIUM: CPT

## 2018-05-25 PROCEDURE — 84145 PROCALCITONIN (PCT): CPT

## 2018-05-25 PROCEDURE — 25000242 PHARM REV CODE 250 ALT 637 W/ HCPCS: Performed by: PHYSICIAN ASSISTANT

## 2018-05-25 PROCEDURE — 36415 COLL VENOUS BLD VENIPUNCTURE: CPT

## 2018-05-25 PROCEDURE — 80053 COMPREHEN METABOLIC PANEL: CPT

## 2018-05-25 PROCEDURE — 99232 SBSQ HOSP IP/OBS MODERATE 35: CPT | Mod: ,,, | Performed by: INTERNAL MEDICINE

## 2018-05-25 PROCEDURE — 87305 ASPERGILLUS AG IA: CPT

## 2018-05-25 RX ORDER — BUTALBITAL, ACETAMINOPHEN AND CAFFEINE 50; 325; 40 MG/1; MG/1; MG/1
1 TABLET ORAL EVERY 6 HOURS PRN
Status: DISCONTINUED | OUTPATIENT
Start: 2018-05-25 | End: 2018-05-30

## 2018-05-25 RX ORDER — PROMETHAZINE HYDROCHLORIDE 25 MG/1
25 TABLET ORAL EVERY 8 HOURS PRN
Status: DISCONTINUED | OUTPATIENT
Start: 2018-05-25 | End: 2018-05-31 | Stop reason: HOSPADM

## 2018-05-25 RX ORDER — GABAPENTIN 300 MG/1
300 CAPSULE ORAL 3 TIMES DAILY
Status: DISCONTINUED | OUTPATIENT
Start: 2018-05-25 | End: 2018-05-26

## 2018-05-25 RX ORDER — MORPHINE SULFATE 15 MG/1
15 TABLET, FILM COATED, EXTENDED RELEASE ORAL 3 TIMES DAILY
Status: DISCONTINUED | OUTPATIENT
Start: 2018-05-25 | End: 2018-05-31 | Stop reason: HOSPADM

## 2018-05-25 RX ORDER — AMLODIPINE BESYLATE 10 MG/1
10 TABLET ORAL DAILY
Status: DISCONTINUED | OUTPATIENT
Start: 2018-05-25 | End: 2018-05-25

## 2018-05-25 RX ORDER — IBUPROFEN 200 MG
24 TABLET ORAL
Status: DISCONTINUED | OUTPATIENT
Start: 2018-05-25 | End: 2018-05-26

## 2018-05-25 RX ORDER — MORPHINE SULFATE 15 MG/1
15 TABLET, FILM COATED, EXTENDED RELEASE ORAL 3 TIMES DAILY
Status: DISCONTINUED | OUTPATIENT
Start: 2018-05-25 | End: 2018-05-25

## 2018-05-25 RX ORDER — OXYCODONE HYDROCHLORIDE 5 MG/1
5 TABLET ORAL EVERY 4 HOURS PRN
Status: DISCONTINUED | OUTPATIENT
Start: 2018-05-25 | End: 2018-05-25

## 2018-05-25 RX ORDER — IBUPROFEN 200 MG
16 TABLET ORAL
Status: DISCONTINUED | OUTPATIENT
Start: 2018-05-25 | End: 2018-05-26

## 2018-05-25 RX ORDER — TIZANIDINE 2 MG/1
6 TABLET ORAL EVERY 6 HOURS PRN
Status: DISCONTINUED | OUTPATIENT
Start: 2018-05-25 | End: 2018-05-31 | Stop reason: HOSPADM

## 2018-05-25 RX ORDER — POLYETHYLENE GLYCOL 3350 17 G/17G
17 POWDER, FOR SOLUTION ORAL 2 TIMES DAILY
Status: DISCONTINUED | OUTPATIENT
Start: 2018-05-25 | End: 2018-05-31 | Stop reason: HOSPADM

## 2018-05-25 RX ORDER — TACROLIMUS 0.5 MG/1
0.5 CAPSULE ORAL 2 TIMES DAILY
Status: DISCONTINUED | OUTPATIENT
Start: 2018-05-25 | End: 2018-05-31 | Stop reason: HOSPADM

## 2018-05-25 RX ORDER — CEFEPIME HYDROCHLORIDE 2 G/1
2 INJECTION, POWDER, FOR SOLUTION INTRAVENOUS
Status: DISCONTINUED | OUTPATIENT
Start: 2018-05-25 | End: 2018-05-28

## 2018-05-25 RX ORDER — OXYCODONE HYDROCHLORIDE 5 MG/1
10 TABLET ORAL EVERY 4 HOURS PRN
Status: DISCONTINUED | OUTPATIENT
Start: 2018-05-25 | End: 2018-05-31 | Stop reason: HOSPADM

## 2018-05-25 RX ORDER — ALBUTEROL SULFATE 90 UG/1
1 AEROSOL, METERED RESPIRATORY (INHALATION) EVERY 4 HOURS PRN
Status: DISCONTINUED | OUTPATIENT
Start: 2018-05-25 | End: 2018-05-31 | Stop reason: HOSPADM

## 2018-05-25 RX ORDER — AMLODIPINE BESYLATE 10 MG/1
10 TABLET ORAL DAILY
Status: DISCONTINUED | OUTPATIENT
Start: 2018-05-26 | End: 2018-05-31 | Stop reason: HOSPADM

## 2018-05-25 RX ORDER — LORAZEPAM 1 MG/1
1 TABLET ORAL EVERY 6 HOURS PRN
Status: DISCONTINUED | OUTPATIENT
Start: 2018-05-25 | End: 2018-05-27

## 2018-05-25 RX ORDER — FERROUS SULFATE, DRIED 160(50) MG
1 TABLET, EXTENDED RELEASE ORAL 2 TIMES DAILY WITH MEALS
Status: DISCONTINUED | OUTPATIENT
Start: 2018-05-25 | End: 2018-05-31 | Stop reason: HOSPADM

## 2018-05-25 RX ORDER — GLUCAGON 1 MG
1 KIT INJECTION
Status: DISCONTINUED | OUTPATIENT
Start: 2018-05-25 | End: 2018-05-26

## 2018-05-25 RX ORDER — MYCOPHENOLATE MOFETIL 250 MG/1
500 CAPSULE ORAL 2 TIMES DAILY
Status: DISCONTINUED | OUTPATIENT
Start: 2018-05-25 | End: 2018-05-31 | Stop reason: HOSPADM

## 2018-05-25 RX ORDER — PANTOPRAZOLE SODIUM 40 MG/1
40 TABLET, DELAYED RELEASE ORAL DAILY
Status: DISCONTINUED | OUTPATIENT
Start: 2018-05-25 | End: 2018-05-26

## 2018-05-25 RX ORDER — ARIPIPRAZOLE 2 MG/1
2 TABLET ORAL NIGHTLY
Status: DISCONTINUED | OUTPATIENT
Start: 2018-05-25 | End: 2018-05-31 | Stop reason: HOSPADM

## 2018-05-25 RX ORDER — ACETAMINOPHEN 500 MG
1000 TABLET ORAL EVERY 6 HOURS PRN
Status: DISCONTINUED | OUTPATIENT
Start: 2018-05-25 | End: 2018-05-31 | Stop reason: HOSPADM

## 2018-05-25 RX ORDER — FOLIC ACID 1 MG/1
1000 TABLET ORAL DAILY
Status: DISCONTINUED | OUTPATIENT
Start: 2018-05-25 | End: 2018-05-31 | Stop reason: HOSPADM

## 2018-05-25 RX ORDER — FLUTICASONE FUROATE AND VILANTEROL 100; 25 UG/1; UG/1
1 POWDER RESPIRATORY (INHALATION) DAILY
Status: DISCONTINUED | OUTPATIENT
Start: 2018-05-25 | End: 2018-05-26

## 2018-05-25 RX ORDER — MORPHINE SULFATE 15 MG/1
15 TABLET ORAL 3 TIMES DAILY PRN
Status: DISCONTINUED | OUTPATIENT
Start: 2018-05-25 | End: 2018-05-25

## 2018-05-25 RX ORDER — MEROPENEM 1 G/1
1 INJECTION, POWDER, FOR SOLUTION INTRAVENOUS
Status: DISCONTINUED | OUTPATIENT
Start: 2018-05-25 | End: 2018-05-25

## 2018-05-25 RX ORDER — LISINOPRIL 2.5 MG/1
10 TABLET ORAL DAILY
Status: DISCONTINUED | OUTPATIENT
Start: 2018-05-25 | End: 2018-05-31 | Stop reason: HOSPADM

## 2018-05-25 RX ORDER — TACROLIMUS 1 MG/1
3 CAPSULE ORAL 2 TIMES DAILY
Status: DISCONTINUED | OUTPATIENT
Start: 2018-05-25 | End: 2018-05-31 | Stop reason: HOSPADM

## 2018-05-25 RX ORDER — GABAPENTIN 300 MG/1
300 CAPSULE ORAL 3 TIMES DAILY
Status: DISCONTINUED | OUTPATIENT
Start: 2018-05-25 | End: 2018-05-25

## 2018-05-25 RX ORDER — ONDANSETRON 4 MG/1
8 TABLET, FILM COATED ORAL EVERY 8 HOURS PRN
Status: DISCONTINUED | OUTPATIENT
Start: 2018-05-25 | End: 2018-05-31 | Stop reason: HOSPADM

## 2018-05-25 RX ORDER — LANOLIN ALCOHOL/MO/W.PET/CERES
400 CREAM (GRAM) TOPICAL 2 TIMES DAILY
Status: DISCONTINUED | OUTPATIENT
Start: 2018-05-25 | End: 2018-05-31 | Stop reason: HOSPADM

## 2018-05-25 RX ORDER — MONTELUKAST SODIUM 10 MG/1
10 TABLET ORAL DAILY
Status: DISCONTINUED | OUTPATIENT
Start: 2018-05-25 | End: 2018-05-31 | Stop reason: HOSPADM

## 2018-05-25 RX ORDER — PREDNISONE 5 MG/1
5 TABLET ORAL DAILY
Status: DISCONTINUED | OUTPATIENT
Start: 2018-05-25 | End: 2018-05-27

## 2018-05-25 RX ORDER — DIPHENHYDRAMINE HCL 25 MG
50 CAPSULE ORAL EVERY 6 HOURS PRN
Status: DISCONTINUED | OUTPATIENT
Start: 2018-05-25 | End: 2018-05-26

## 2018-05-25 RX ORDER — CETIRIZINE HYDROCHLORIDE 5 MG/1
5 TABLET ORAL DAILY
Status: DISCONTINUED | OUTPATIENT
Start: 2018-05-25 | End: 2018-05-31 | Stop reason: HOSPADM

## 2018-05-25 RX ORDER — DIPHENHYDRAMINE HYDROCHLORIDE 50 MG/ML
50 INJECTION INTRAMUSCULAR; INTRAVENOUS EVERY 6 HOURS PRN
Status: DISCONTINUED | OUTPATIENT
Start: 2018-05-25 | End: 2018-05-26

## 2018-05-25 RX ORDER — FLUTICASONE PROPIONATE 50 MCG
2 SPRAY, SUSPENSION (ML) NASAL DAILY
Status: DISCONTINUED | OUTPATIENT
Start: 2018-05-26 | End: 2018-05-31 | Stop reason: HOSPADM

## 2018-05-25 RX ORDER — MIRTAZAPINE 15 MG/1
30 TABLET, FILM COATED ORAL NIGHTLY
Status: DISCONTINUED | OUTPATIENT
Start: 2018-05-25 | End: 2018-05-31 | Stop reason: HOSPADM

## 2018-05-25 RX ORDER — GABAPENTIN 400 MG/1
400 CAPSULE ORAL 3 TIMES DAILY
Status: DISCONTINUED | OUTPATIENT
Start: 2018-05-25 | End: 2018-05-25

## 2018-05-25 RX ORDER — ARIPIPRAZOLE 2 MG/1
2 TABLET ORAL DAILY
Status: DISCONTINUED | OUTPATIENT
Start: 2018-05-25 | End: 2018-05-25

## 2018-05-25 RX ORDER — DAPSONE 100 MG/1
100 TABLET ORAL DAILY
Status: DISCONTINUED | OUTPATIENT
Start: 2018-05-25 | End: 2018-05-31 | Stop reason: HOSPADM

## 2018-05-25 RX ADMIN — CETIRIZINE HYDROCHLORIDE 5 MG: 5 TABLET, FILM COATED ORAL at 03:05

## 2018-05-25 RX ADMIN — OYSTER SHELL CALCIUM WITH VITAMIN D 1 TABLET: 500; 200 TABLET, FILM COATED ORAL at 05:05

## 2018-05-25 RX ADMIN — DIPHENHYDRAMINE HYDROCHLORIDE 50 MG: 50 INJECTION, SOLUTION INTRAMUSCULAR; INTRAVENOUS at 04:05

## 2018-05-25 RX ADMIN — ARIPIPRAZOLE 2 MG: 2 TABLET ORAL at 10:05

## 2018-05-25 RX ADMIN — PANCRELIPASE 4 CAPSULE: 24000; 76000; 120000 CAPSULE, DELAYED RELEASE PELLETS ORAL at 03:05

## 2018-05-25 RX ADMIN — Medication 400 MG: at 10:05

## 2018-05-25 RX ADMIN — LORAZEPAM 1 MG: 1 TABLET ORAL at 03:05

## 2018-05-25 RX ADMIN — MYCOPHENOLATE MOFETIL 500 MG: 250 CAPSULE ORAL at 10:05

## 2018-05-25 RX ADMIN — GABAPENTIN 300 MG: 300 CAPSULE ORAL at 10:05

## 2018-05-25 RX ADMIN — LISINOPRIL 10 MG: 2.5 TABLET ORAL at 03:05

## 2018-05-25 RX ADMIN — PANCRELIPASE 6 CAPSULE: 24000; 76000; 120000 CAPSULE, DELAYED RELEASE PELLETS ORAL at 05:05

## 2018-05-25 RX ADMIN — MIRTAZAPINE 30 MG: 15 TABLET, FILM COATED ORAL at 10:05

## 2018-05-25 RX ADMIN — MORPHINE SULFATE 15 MG: 15 TABLET, EXTENDED RELEASE ORAL at 10:05

## 2018-05-25 RX ADMIN — FLUTICASONE FUROATE AND VILANTEROL TRIFENATATE 1 PUFF: 100; 25 POWDER RESPIRATORY (INHALATION) at 04:05

## 2018-05-25 RX ADMIN — FOLIC ACID 1000 MCG: 1 TABLET ORAL at 03:05

## 2018-05-25 RX ADMIN — TACROLIMUS 0.5 MG: 0.5 CAPSULE ORAL at 05:05

## 2018-05-25 RX ADMIN — OXYCODONE HYDROCHLORIDE 10 MG: 5 TABLET ORAL at 08:05

## 2018-05-25 RX ADMIN — OXYCODONE HYDROCHLORIDE 10 MG: 5 TABLET ORAL at 11:05

## 2018-05-25 RX ADMIN — DAPSONE 100 MG: 100 TABLET ORAL at 03:05

## 2018-05-25 RX ADMIN — PANTOPRAZOLE SODIUM 40 MG: 40 TABLET, DELAYED RELEASE ORAL at 03:05

## 2018-05-25 RX ADMIN — LORAZEPAM 1 MG: 1 TABLET ORAL at 11:05

## 2018-05-25 RX ADMIN — TACROLIMUS 3 MG: 1 CAPSULE ORAL at 05:05

## 2018-05-25 RX ADMIN — SODIUM CHLORIDE 750 MG: 9 INJECTION, SOLUTION INTRAVENOUS at 05:05

## 2018-05-25 RX ADMIN — MONTELUKAST SODIUM 10 MG: 10 TABLET, FILM COATED ORAL at 03:05

## 2018-05-25 RX ADMIN — OXYCODONE HYDROCHLORIDE 10 MG: 5 TABLET ORAL at 03:05

## 2018-05-25 RX ADMIN — CEFEPIME 2 G: 2 INJECTION, POWDER, FOR SOLUTION INTRAVENOUS at 04:05

## 2018-05-25 RX ADMIN — PREDNISONE 5 MG: 5 TABLET ORAL at 03:05

## 2018-05-25 RX ADMIN — MORPHINE SULFATE 15 MG: 15 TABLET, EXTENDED RELEASE ORAL at 05:05

## 2018-05-25 RX ADMIN — GABAPENTIN 400 MG: 400 CAPSULE ORAL at 03:05

## 2018-05-25 NOTE — ASSESSMENT & PLAN NOTE
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 Transplant history complicated by history of MRSA, Pseudomonas PNA, and CMV viremia.    No concern for graft dysfunction at this time. Continue current immunosuppression and prophylaxis.  Will continue empiric coverage with vancomycin and cefepime.

## 2018-05-25 NOTE — HPI
"Reason for Consult: Management of steroid induced Hyperglycemia     Surgical Procedure and Date: lung transplant 2014    Diabetes diagnosis year: reports PCP told her she had a "borderline GTT"     Home Diabetes Medications:  toujeo 3 units daily     How often checking glucose at home? Does not check   BG readings on regimen:   Hypoglycemia on the regimen?  denies s/s hypoglycemia  Missed doses on regimen?  No    Diabetes Complications include:     none    Complicating diabetes co morbidities:   Glucocorticoid use       HPI:   Patient is a 28 y.o. female with a diagnosis of CF s/p lung transplant, CF pancreatic insufficiency, admitted for SOB, cough and congestion after returning from a cruise with concern for infection. Endo consulted for BG management. Pt reports compliance with toujeo as rx by her PCP for "borderline abnormal GTT" but she does not check BG and reports she does not have a diagnosis of DM. Denies FH DM. Currently taking PDN 5mg daily.         "

## 2018-05-25 NOTE — SUBJECTIVE & OBJECTIVE
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       Past Surgical History:   Procedure Laterality Date    ABDOMINAL SURGERY      peg tube     CHOLECYSTECTOMY  2016    ENDOMETRIAL ABLATION  2015    [facility]    FESS      LARYNX SURGERY  2016    LUNG TRANSPLANT Bilateral 6/12/14    MYRINGOTOMY W/ TUBES Right 04/2017    PORTACATH PLACEMENT Right     rt sc    SALPINGECTOMY Bilateral 2015    [facility]       Review of patient's allergies indicates:   Allergen Reactions    Albuterol Palpitations    Colistin Anaphylaxis    Vancomycin analogues      Infusion reaction that does not resolve with slowing    Neupogen [filgrastim] Other (See Comments)     Ostealgia after five daily doses of 300 mcg.      Bactrim [sulfamethoxazole-trimethoprim] Hives    Ceftazidime Hives     Pt stated can tolerate cefapine not ceftazidime    Ceftazidime     Dronabinol Other (See Comments)     Mental changes/hallucinations    Haldol [haloperidol lactate] Other (See Comments)     Seizure like activity    Nsaids (non-steroidal anti-inflammatory drug)      Cannot have due to lung transplant    Adhesive Rash     Cloth tape- please use tegaderm or paper tape    Aztreonam Rash    Ciprofloxacin Nausea And Vomiting     Projectile N/V, per patient.  Unwilling to retry therapy.       PTA Medications   Medication Sig    amlodipine (NORVASC) 5 MG tablet Take 10 mg by mouth once daily.     aripiprazole (ABILIFY) 2 MG Tab Take 2 mg by mouth once daily.    butalbital-acetaminophen-caffeine -40 mg (FIORICET, ESGIC) -40 mg per tablet TAKE 1 TABLET BY  MOUTH EVERY 6 HOURS AS NEEDED FOR HEADACHE    calcium-vitamin D3 500 mg(1,250mg) -200 unit per tablet Take 1 tablet by mouth 2 (two) times daily with meals.    CREON 24,000-76,000 -120,000 unit capsule TAKE 5 CAPSULES BY MOUTH WITH MEALS AND 4 CAPSULES WITH SNACKS EVERYDAY    dapsone 100 MG Tab TAKE 1 TABLET BY MOUTH EVERY DAY    diphenhydrAMINE (BENADRYL) 50 MG tablet Take 1 tablet (50 mg total) by mouth every 6 (six) hours as needed for Itching.    DULERA 100-5 mcg/actuation HFAA INHALE 1 PUFF BY MOUTH TWICE DAILY    fexofenadine (ALLEGRA) 180 MG tablet Take 180 mg by mouth once daily.    fluconazole (DIFLUCAN) 150 MG Tab TAKE 1 TABLET BY MOUTH EVERY WEEK (Patient taking differently: TAKE 1 TABLET BY MOUTH EVERY WEEK PRN VAGINAL YEAST S/S)    fluticasone (FLONASE) 50 mcg/actuation nasal spray INSERT 2 SPRAYS IN EACH NOSTRIL DAILY    folic acid (FOLVITE) 1 MG tablet Take 1 tablet (1,000 mcg total) by mouth once daily.    gabapentin (NEURONTIN) 300 MG capsule Take 1 capsule (300 mg total) by mouth 3 (three) times daily. (Patient taking differently: Take 1,100 mg by mouth 3 (three) times daily. )    insulin glargine, TOUJEO, (TOUJEO) 300 unit/mL (1.5 mL) InPn pen Inject 3 Units into the skin once daily.     levalbuterol (XOPENEX HFA) 45 mcg/actuation inhaler INHALE 1 TO 2 PUFFS INTO THE LUNGS EVERY 6 HOURS AS NEEDED FOR WHEEZING OR SHORTNESS OF BREATH. USE WITH SPACER.    lisinopril 10 MG tablet Take 10 mg by mouth once daily.    LORazepam (ATIVAN) 1 MG tablet Take 1 mg by mouth every 6 (six) hours as needed for Anxiety.    magnesium oxide (MAG-OX) 400 mg tablet Take 1 tablet (400 mg total) by mouth 2 (two) times daily.    mirtazapine (REMERON) 30 MG tablet Take 30 mg by mouth every evening.    montelukast (SINGULAIR) 10 mg tablet TAKE 1 TABLET BY MOUTH EVERY DAY    morphine (MS CONTIN) 15 MG 12 hr tablet Take 15 mg by mouth 3 (three) times daily.    mv. min cmb#[id number] (AQUADEKS) 100-700-10  mcg-mcg-mg Cap cap Take 1 capsule by mouth 2 (two) times daily.    mycophenolate (CELLCEPT) 250 mg Cap Take 2 capsules (500 mg total) by mouth 2 (two) times daily.    omeprazole (PRILOSEC) 40 MG capsule TAKE 1 CAPSULE BY MOUTH EVERY MORNING    ondansetron (ZOFRAN) 8 MG tablet TAKE 1 TABLET(8 MG) BY MOUTH EVERY 8 HOURS AS NEEDED    oxycodone (ROXICODONE) 5 MG immediate release tablet Take 10 mg by mouth every 4 (four) hours as needed for Pain.    polyethylene glycol (GLYCOLAX) 17 gram PwPk Take 17 g by mouth 2 (two) times daily.    predniSONE (DELTASONE) 5 MG tablet Take 5 mg by mouth once daily.    PROCHAMBER USE AS DIRECTED    promethazine (PHENERGAN) 25 MG tablet TAKE 1 TABLET BY MOUTH EVERY 8 HOURS AS NEEDED FOR NAUSEA    tacrolimus (PROGRAF) 0.5 MG Cap Take 1 capsule (0.5 mg total) by mouth every 12 (twelve) hours.    tacrolimus (PROGRAF) 1 MG Cap Take 3 capsules (3 mg total) by mouth every 12 (twelve) hours. Daily doses: 3.5 mg every 12 hours.    tiZANidine (ZANAFLEX) 4 MG tablet TAKE 2 TABLETS BY MOUTH EVERY 6 HOURS AS NEEDED     Family History     Problem Relation (Age of Onset)    Drug abuse Maternal Grandfather    Thrombocytopenia Maternal Grandfather        Social History Main Topics    Smoking status: Never Smoker    Smokeless tobacco: Never Used    Alcohol use No      Comment: Has not had an alcoholic drink in more than 2 months.    Drug use: No    Sexual activity: Yes     Partners: Male     Birth control/ protection: Implant     Review of Systems   Constitutional: Positive for activity change and fatigue. Negative for chills and fever.   HENT: Positive for congestion and rhinorrhea. Negative for sinus pressure and sore throat.    Eyes: Negative.    Respiratory: Positive for cough and shortness of breath.    Cardiovascular: Negative for palpitations and leg swelling.   Gastrointestinal: Positive for nausea (baseline). Negative for abdominal pain, diarrhea and vomiting.   Endocrine:  Negative.    Genitourinary: Negative for dysuria, frequency and urgency.   Musculoskeletal: Positive for back pain (chronic). Negative for myalgias.   Skin: Negative for color change, pallor and rash.   Allergic/Immunologic: Positive for immunocompromised state.   Neurological: Negative for dizziness, weakness and headaches.   Hematological: Negative.    Psychiatric/Behavioral: Negative for agitation and behavioral problems.     Objective:     Vital Signs (Most Recent):  Temp: 98.7 °F (37.1 °C) (05/25/18 1327)  Pulse: 88 (05/25/18 1327)  Resp: 20 (05/25/18 1327)  BP: 122/74 (05/25/18 1327)  SpO2: 95 % (05/25/18 1327) Vital Signs (24h Range):  Temp:  [98.7 °F (37.1 °C)] 98.7 °F (37.1 °C)  Pulse:  [88] 88  Resp:  [20] 20  SpO2:  [95 %] 95 %  BP: (122)/(74) 122/74     Weight: 54 kg (119 lb)  Body mass index is 23.24 kg/m².    No intake or output data in the 24 hours ending 05/25/18 1441    Physical Exam   Constitutional: She is oriented to person, place, and time. She appears well-developed and well-nourished. No distress.   HENT:   Head: Normocephalic and atraumatic.   Eyes: Conjunctivae and EOM are normal.   Neck: Normal range of motion. Neck supple.   Cardiovascular: Normal rate, regular rhythm, normal heart sounds and intact distal pulses.  Exam reveals no gallop and no friction rub.    No murmur heard.  Pulmonary/Chest: Effort normal. No respiratory distress. She has no wheezes. She has no rales.   Decreased breath sounds noted in bilateral lower lung fields.   Neurological: She is alert and oriented to person, place, and time.   Skin: Skin is warm and dry. Capillary refill takes less than 2 seconds. No rash noted. No erythema. No pallor.   Psychiatric: She has a normal mood and affect. Her behavior is normal. Judgment and thought content normal.   Nursing note and vitals reviewed.      Significant Labs:  CBC:  No results for input(s): WBC, RBC, HGB, HCT, PLT, MCV, MCH, MCHC in the last 72 hours.  BMP:  No results  for input(s): GLUCOSE, NA, K, CL, CO2, BUN, CREATININE, CALCIUM in the last 72 hours.     I have reviewed all pertinent labs within the past 24 hours.    Diagnostic Results:  Labs: Reviewed  X-Ray: Reviewed

## 2018-05-25 NOTE — HPI
Juanita Ibarra is a 29 YO female with history of BLT (CMV D+/R-, July 2014) secondary to CF. 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 Patient recently admitted to outside hospital in [place] earlier this week and received empiric vancomycin and meropenem for RLL pneumonia. Patient notes that 4-5 days ago she noticed increased rhinorrhea, sinus congestion, CARROLL, and dry cough that feels congested. Patient reports O2 sats at home in high 80s upon exertion on room air. Patient notes that on arrival to outside ED her O2 sats were in low 90s. Patient reports that she continues to feel poorly and unimproved. Patient reports baseline nausea but does note increased fatigue over the last week. Patient reports going on a cruise and snorkeling in [place] and [place] earlier this month that may have exposed her to sick contacts. Denies fevers, chills, vomiting, diarrhea.

## 2018-05-25 NOTE — H&P
[facility]  Lung Transplant  H&P    Patient Name: Juanita Ibarra  MRN: [id number]  Admission Date: 5/25/2018  Attending Physician: Jake Alvarez MD  Primary Care Provider: Jake Alvarez MD     Subjective:     History of Present Illness:  Juanita Ibarra is a 27 YO female with  history of BLT (CMV D+/R-, July 2014) secondary to CF. 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 Patient recently admitted to outside hospital in [place] earlier this week and received empiric vancomycin and meropenem for RLL pneumonia. Patient notes that 4-5 days ago she noticed increased rhinorrhea, sinus congestion, CARROLL, and dry cough that feels congested. Patient reports O2 sats at home in high 80s upon exertion on room air. Patient notes that on arrival to outside ED her O2 sats were in low 90s. Patient reports that she continues to feel poorly and unimproved. Patient reports baseline nausea but does note increased fatigue over the last week. Patient reports going on a cruise and snorkeling in [place] and [place] earlier this month that may have exposed her to sick contacts. Denies fevers, chills, vomiting, diarrhea.     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       Past Surgical History:   Procedure Laterality Date    ABDOMINAL SURGERY      peg tube     CHOLECYSTECTOMY  2016    ENDOMETRIAL ABLATION  2015    K[facility]    FESS      LARYNX SURGERY  2016    LUNG  TRANSPLANT Bilateral 6/12/14    MYRINGOTOMY W/ TUBES Right 04/2017    PORTACATH PLACEMENT Right     rt sc    SALPINGECTOMY Bilateral 2015    KJB       Review of patient's allergies indicates:   Allergen Reactions    Albuterol Palpitations    Colistin Anaphylaxis    Vancomycin analogues      Infusion reaction that does not resolve with slowing    Neupogen [filgrastim] Other (See Comments)     Ostealgia after five daily doses of 300 mcg.      Bactrim [sulfamethoxazole-trimethoprim] Hives    Ceftazidime Hives     Pt stated can tolerate cefapine not ceftazidime    Ceftazidime     Dronabinol Other (See Comments)     Mental changes/hallucinations    Haldol [haloperidol lactate] Other (See Comments)     Seizure like activity    Nsaids (non-steroidal anti-inflammatory drug)      Cannot have due to lung transplant    Adhesive Rash     Cloth tape- please use tegaderm or paper tape    Aztreonam Rash    Ciprofloxacin Nausea And Vomiting     Projectile N/V, per patient.  Unwilling to retry therapy.       PTA Medications   Medication Sig    amlodipine (NORVASC) 5 MG tablet Take 10 mg by mouth once daily.     aripiprazole (ABILIFY) 2 MG Tab Take 2 mg by mouth once daily.    butalbital-acetaminophen-caffeine -40 mg (FIORICET, ESGIC) -40 mg per tablet TAKE 1 TABLET BY MOUTH EVERY 6 HOURS AS NEEDED FOR HEADACHE    calcium-vitamin D3 500 mg(1,250mg) -200 unit per tablet Take 1 tablet by mouth 2 (two) times daily with meals.    CREON 24,000-76,000 -120,000 unit capsule TAKE 5 CAPSULES BY MOUTH WITH MEALS AND 4 CAPSULES WITH SNACKS EVERYDAY    dapsone 100 MG Tab TAKE 1 TABLET BY MOUTH EVERY DAY    diphenhydrAMINE (BENADRYL) 50 MG tablet Take 1 tablet (50 mg total) by mouth every 6 (six) hours as needed for Itching.    DULERA 100-5 mcg/actuation HFAA INHALE 1 PUFF BY MOUTH TWICE DAILY    fexofenadine (ALLEGRA) 180 MG tablet Take 180 mg by mouth once daily.    fluconazole (DIFLUCAN) 150 MG Tab TAKE 1  TABLET BY MOUTH EVERY WEEK (Patient taking differently: TAKE 1 TABLET BY MOUTH EVERY WEEK PRN VAGINAL YEAST S/S)    fluticasone (FLONASE) 50 mcg/actuation nasal spray INSERT 2 SPRAYS IN EACH NOSTRIL DAILY    folic acid (FOLVITE) 1 MG tablet Take 1 tablet (1,000 mcg total) by mouth once daily.    gabapentin (NEURONTIN) 300 MG capsule Take 1 capsule (300 mg total) by mouth 3 (three) times daily. (Patient taking differently: Take 1,100 mg by mouth 3 (three) times daily. )    insulin glargine, TOUJEO, (TOUJEO) 300 unit/mL (1.5 mL) InPn pen Inject 3 Units into the skin once daily.     levalbuterol (XOPENEX HFA) 45 mcg/actuation inhaler INHALE 1 TO 2 PUFFS INTO THE LUNGS EVERY 6 HOURS AS NEEDED FOR WHEEZING OR SHORTNESS OF BREATH. USE WITH SPACER.    lisinopril 10 MG tablet Take 10 mg by mouth once daily.    LORazepam (ATIVAN) 1 MG tablet Take 1 mg by mouth every 6 (six) hours as needed for Anxiety.    magnesium oxide (MAG-OX) 400 mg tablet Take 1 tablet (400 mg total) by mouth 2 (two) times daily.    mirtazapine (REMERON) 30 MG tablet Take 30 mg by mouth every evening.    montelukast (SINGULAIR) 10 mg tablet TAKE 1 TABLET BY MOUTH EVERY DAY    morphine (MS CONTIN) 15 MG 12 hr tablet Take 15 mg by mouth 3 (three) times daily.    mv. min cmb#[id number] (AQUADEKS) 100-700-10 mcg-mcg-mg Cap cap Take 1 capsule by mouth 2 (two) times daily.    mycophenolate (CELLCEPT) 250 mg Cap Take 2 capsules (500 mg total) by mouth 2 (two) times daily.    omeprazole (PRILOSEC) 40 MG capsule TAKE 1 CAPSULE BY MOUTH EVERY MORNING    ondansetron (ZOFRAN) 8 MG tablet TAKE 1 TABLET(8 MG) BY MOUTH EVERY 8 HOURS AS NEEDED    oxycodone (ROXICODONE) 5 MG immediate release tablet Take 10 mg by mouth every 4 (four) hours as needed for Pain.    polyethylene glycol (GLYCOLAX) 17 gram PwPk Take 17 g by mouth 2 (two) times daily.    predniSONE (DELTASONE) 5 MG tablet Take 5 mg by mouth once daily.    PROCHAMBER USE AS DIRECTED     promethazine (PHENERGAN) 25 MG tablet TAKE 1 TABLET BY MOUTH EVERY 8 HOURS AS NEEDED FOR NAUSEA    tacrolimus (PROGRAF) 0.5 MG Cap Take 1 capsule (0.5 mg total) by mouth every 12 (twelve) hours.    tacrolimus (PROGRAF) 1 MG Cap Take 3 capsules (3 mg total) by mouth every 12 (twelve) hours. Daily doses: 3.5 mg every 12 hours.    tiZANidine (ZANAFLEX) 4 MG tablet TAKE 2 TABLETS BY MOUTH EVERY 6 HOURS AS NEEDED     Family History     Problem Relation (Age of Onset)    Drug abuse Maternal Grandfather    Thrombocytopenia Maternal Grandfather        Social History Main Topics    Smoking status: Never Smoker    Smokeless tobacco: Never Used    Alcohol use No      Comment: Has not had an alcoholic drink in more than 2 months.    Drug use: No    Sexual activity: Yes     Partners: Male     Birth control/ protection: Implant     Review of Systems   Constitutional: Positive for activity change and fatigue. Negative for chills and fever.   HENT: Positive for congestion and rhinorrhea. Negative for sinus pressure and sore throat.    Eyes: Negative.    Respiratory: Positive for cough and shortness of breath.    Cardiovascular: Negative for palpitations and leg swelling.   Gastrointestinal: Positive for nausea (baseline). Negative for abdominal pain, diarrhea and vomiting.   Endocrine: Negative.    Genitourinary: Negative for dysuria, frequency and urgency.   Musculoskeletal: Positive for back pain (chronic). Negative for myalgias.   Skin: Negative for color change, pallor and rash.   Allergic/Immunologic: Positive for immunocompromised state.   Neurological: Negative for dizziness, weakness and headaches.   Hematological: Negative.    Psychiatric/Behavioral: Negative for agitation and behavioral problems.     Objective:     Vital Signs (Most Recent):  Temp: 98.7 °F (37.1 °C) (05/25/18 1327)  Pulse: 88 (05/25/18 1327)  Resp: 20 (05/25/18 1327)  BP: 122/74 (05/25/18 1327)  SpO2: 95 % (05/25/18 1327) Vital Signs (24h  Range):  Temp:  [98.7 °F (37.1 °C)] 98.7 °F (37.1 °C)  Pulse:  [88] 88  Resp:  [20] 20  SpO2:  [95 %] 95 %  BP: (122)/(74) 122/74     Weight: 54 kg (119 lb)  Body mass index is 23.24 kg/m².    No intake or output data in the 24 hours ending 05/25/18 1441    Physical Exam   Constitutional: She is oriented to person, place, and time. She appears well-developed and well-nourished. No distress.   HENT:   Head: Normocephalic and atraumatic.   Eyes: Conjunctivae and EOM are normal.   Neck: Normal range of motion. Neck supple.   Cardiovascular: Normal rate, regular rhythm, normal heart sounds and intact distal pulses.  Exam reveals no gallop and no friction rub.    No murmur heard.  Pulmonary/Chest: Effort normal. No respiratory distress. She has no wheezes. She has no rales.   Decreased breath sounds noted in bilateral lower lung fields.   Neurological: She is alert and oriented to person, place, and time.   Skin: Skin is warm and dry. Capillary refill takes less than 2 seconds. No rash noted. No erythema. No pallor.   Psychiatric: She has a normal mood and affect. Her behavior is normal. Judgment and thought content normal.   Nursing note and vitals reviewed.      Significant Labs:  CBC:  No results for input(s): WBC, RBC, HGB, HCT, PLT, MCV, MCH, MCHC in the last 72 hours.  BMP:  No results for input(s): GLUCOSE, NA, K, CL, CO2, BUN, CREATININE, CALCIUM in the last 72 hours.     I have reviewed all pertinent labs within the past 24 hours.    Diagnostic Results:  Labs: Reviewed  X-Ray: Reviewed    Assessment/Plan:     Pneumonia    Previously admitted earlier this week at outside hospital for RLL PNA.  Will repeat CXR and lab studies to investigate underlying cause of increasing oxygen requirements. Underlying cause possible ongoing infection vs RLL pneumonia vs CARLOS EDUARDO. Previously treated with empiric vancomycin and meropenem earlier this week at outside hospital.   CBC, CMP, Aspergillus ag, Fungitell, blood cultures,  procalcitonin, respiratory culture,  and CXR all obtained upon admission - results pending.     Currently 95% on 2L NC. Continue oxygen supplementation. Titrate for goal sat > 88%.  Empiric vancomycin and cefepime initiated upon admission given history of MRSA and  Pseudomonas           Lung transplant status, bilateral    BLT (CMV D+/R-) secondary to CF. Transplant history complicated by history of MRSA, Pseudomonas PNA, and CMV viremia.    No concern for graft dysfunction at this time. Continue current immunosuppression and prophylaxis.  Will continue empiric coverage with vancomycin and cefepime.           Immunosuppression    Current outpatient regimen: tacrolimus 3.5 mg BID, prednisone 5 mg.     Will continue with current immunosuppression. Monitor daily tacrolimus levels and dose adjust accordingly.          Prophylactic antibiotic    Current outpatient regimen: dapsone 100 mg daily.     Continue dapsone for PCP prophylaxis.         Nausea    Baseline nausea controlled with PRN Zofran and Phenergan.         CF related Pancreatic insufficiency    Continue current outpatient regimen of Creon.         Chronic pain with opiate use    Will continue current outpatient regimen per patient's pain management physician and PCP.         Sinusitis, chronic    History of acute mastoiditis in April 2017 s/p myringotomy. Last CT sinus without contrast in March 2018.  Follows with Dr. Olsen ENT as outpatient.   Continue with cetirizine and montelukast.         Hyperglycemia    BG stable upon admission. Endocrinology consulted, appreciate recs.             Jacqueline Dumont PA-C  Lung Transplant  [facility]

## 2018-05-25 NOTE — ASSESSMENT & PLAN NOTE
History suggestive of steroid induced hyperglycemia and/or CFRDM  BG goal 140-180    Start low correction, checks ac/hs and evaluate need for insulin     Check labs for c peptide, glucose, A1c.

## 2018-05-25 NOTE — CONSULTS
"[facility]  Endocrinology  Diabetes Consult Note    Consult Requested by: Jake Alvarez MD   Reason for admit: <principal problem not specified>    HISTORY OF PRESENT ILLNESS:  Reason for Consult: Management of steroid induced Hyperglycemia     Surgical Procedure and Date: lung transplant 2014    Diabetes diagnosis year: reports PCP told her she had a "borderline GTT"     Home Diabetes Medications:  toujeo 3 units daily     How often checking glucose at home? Does not check   BG readings on regimen:   Hypoglycemia on the regimen?  denies s/s hypoglycemia  Missed doses on regimen?  No    Diabetes Complications include:     none    Complicating diabetes co morbidities:   Glucocorticoid use       HPI:   Patient is a 28 y.o. female with a diagnosis of CF s/p lung transplant, CF pancreatic insufficiency, admitted for SOB, cough and congestion after returning from a cruise with concern for infection. Endo consulted for BG management. Pt reports compliance with toujeo as rx by her PCP for "borderline abnormal GTT" but she does not check BG and reports she does not have a diagnosis of DM. Denies FH DM. Currently taking PDN 5mg daily.             PMH, PSH, FH, SH updated and reviewed       Review of Systems    REVIEW OF SYSTEMS  Constitutional: Negative for weight changes.  Eyes: Negative for visual disturbance.  Respiratory: + for cough + SOB, +congestion   Cardiovascular: Negative for chest pain.  Gastrointestinal: Negative for nausea.  Endocrine: Negative for polyuria, polydipsia.  Musculoskeletal: Negative for back pain.  Skin: Negative for rash.  Neurological: Negative for syncope.  Psychiatric/Behavioral: Negative for depression.      PHYSICAL EXAMINATION:  Vitals:    05/25/18 1327   BP: 122/74   Pulse: 88   Resp: 20   Temp: 98.7 °F (37.1 °C)     Body mass index is 23.24 kg/m².    Physical Exam   PHYSICAL EXAMINATION  Constitutional:  Well developed, well nourished, NAD.  ENT: External ears no " masses with nose patent; normal hearing.   Neck:  Supple; trachea midline; no thyromegaly.   Cardiovascular: Normal heart sounds, no LE edema.     Lungs:  Normal effort; lungs anterior bilaterally clear to auscultation.  Abdomen:  Soft, no masses,  no hernias. Central obesity  MS: No clubbing or cyanosis of nails noted; normal gait   Skin: No rashes, lesions, or ulcers; no nodules.  Psychiatric: Good judgement and insight; normal mood and affect.  Neurological: Cranial nerves are grossly intact.       Labs Reviewed and Include   No results for input(s): GLU, CALCIUM, ALBUMIN, PROT, NA, K, CO2, CL, BUN, CREATININE, ALKPHOS, ALT, AST, BILITOT in the last 24 hours.  Lab Results   Component Value Date    WBC 8.76 04/23/2018    HGB 10.5 (L) 04/23/2018    HCT 34.9 (L) 04/23/2018    MCV 89 04/23/2018     04/23/2018     No results for input(s): TSH, FREET4 in the last 168 hours.  Lab Results   Component Value Date    HGBA1C 4.9 06/29/2017       Nutritional status:   Body mass index is 23.24 kg/m².  Lab Results   Component Value Date    ALBUMIN 3.9 04/23/2018    ALBUMIN 3.3 (L) 03/18/2018    ALBUMIN 3.5 01/15/2018     Lab Results   Component Value Date    PREALBUMIN 15 (L) 05/29/2014    PREALBUMIN 11 (L) 05/09/2014    PREALBUMIN 18 (L) 03/19/2014       CrCl cannot be calculated (Patient's most recent lab result is older than the maximum 7 days allowed.).    Accu-Checks  No results for input(s): POCTGLUCOSE in the last 72 hours.     ASSESSMENT and PLAN    Hyperglycemia    History suggestive of steroid induced hyperglycemia and/or CFRDM  BG goal 140-180    Start low correction, checks ac/hs and evaluate need for insulin     Check labs for c peptide, glucose, A1c.          Current chronic use of systemic steroids    May increase insulin needs            Pneumonia    On abx          Lung transplant status, bilateral    Per transplant            Plan discussed with patient, family, and RN at bedside.     Katherine Baron,  MD  Endocrinology  [facility]

## 2018-05-25 NOTE — ASSESSMENT & PLAN NOTE
Current outpatient regimen: tacrolimus 3.5 mg BID, prednisone 5 mg.     Will continue with current immunosuppression. Monitor daily tacrolimus levels and dose adjust accordingly.

## 2018-05-25 NOTE — ASSESSMENT & PLAN NOTE
Previously admitted earlier this week at outside hospital for RLL PNA.  Will repeat CXR and lab studies to investigate underlying cause of increasing oxygen requirements. Underlying cause possible ongoing infection vs RLL pneumonia vs CARLOS EDUARDO. Previously treated with empiric vancomycin and meropenem earlier this week at outside hospital.   CBC, CMP, Aspergillus ag, Fungitell, blood cultures, procalcitonin, respiratory culture,  and CXR all obtained upon admission - results pending.     Currently 95% on 2L NC. Continue oxygen supplementation. Titrate for goal sat > 88%.  Empiric vancomycin and cefepime initiated upon admission given history of MRSA and  Pseudomonas

## 2018-05-25 NOTE — ASSESSMENT & PLAN NOTE
History of acute mastoiditis in April 2017. Follows with Dr. Olsen ENT.   Continue with cetirizine and montelukast.

## 2018-05-25 NOTE — SUBJECTIVE & OBJECTIVE
PMH, PSH, FH, SH updated and reviewed       Review of Systems    REVIEW OF SYSTEMS  Constitutional: Negative for weight changes.  Eyes: Negative for visual disturbance.  Respiratory: + for cough + SOB, +congestion   Cardiovascular: Negative for chest pain.  Gastrointestinal: Negative for nausea.  Endocrine: Negative for polyuria, polydipsia.  Musculoskeletal: Negative for back pain.  Skin: Negative for rash.  Neurological: Negative for syncope.  Psychiatric/Behavioral: Negative for depression.      PHYSICAL EXAMINATION:  Vitals:    05/25/18 1327   BP: 122/74   Pulse: 88   Resp: 20   Temp: 98.7 °F (37.1 °C)     Body mass index is 23.24 kg/m².    Physical Exam   PHYSICAL EXAMINATION  Constitutional:  Well developed, well nourished, NAD.  ENT: External ears no masses with nose patent; normal hearing.   Neck:  Supple; trachea midline; no thyromegaly.   Cardiovascular: Normal heart sounds, no LE edema.     Lungs:  Normal effort; lungs anterior bilaterally clear to auscultation.  Abdomen:  Soft, no masses,  no hernias. Central obesity  MS: No clubbing or cyanosis of nails noted; normal gait   Skin: No rashes, lesions, or ulcers; no nodules.  Psychiatric: Good judgement and insight; normal mood and affect.  Neurological: Cranial nerves are grossly intact.

## 2018-05-26 LAB
ALBUMIN SERPL BCP-MCNC: 3.4 G/DL
ALP SERPL-CCNC: 130 U/L
ALT SERPL W/O P-5'-P-CCNC: 18 U/L
ANION GAP SERPL CALC-SCNC: 4 MMOL/L
AST SERPL-CCNC: 13 U/L
BASOPHILS # BLD AUTO: 0.02 K/UL
BASOPHILS NFR BLD: 0.3 %
BILIRUB SERPL-MCNC: 0.4 MG/DL
BUN SERPL-MCNC: 12 MG/DL
CALCIUM SERPL-MCNC: 9.2 MG/DL
CHLORIDE SERPL-SCNC: 108 MMOL/L
CO2 SERPL-SCNC: 28 MMOL/L
CREAT SERPL-MCNC: 0.9 MG/DL
DIFFERENTIAL METHOD: ABNORMAL
EOSINOPHIL # BLD AUTO: 0.2 K/UL
EOSINOPHIL NFR BLD: 2.5 %
ERYTHROCYTE [DISTWIDTH] IN BLOOD BY AUTOMATED COUNT: 16.5 %
EST. GFR  (AFRICAN AMERICAN): >60 ML/MIN/1.73 M^2
EST. GFR  (NON AFRICAN AMERICAN): >60 ML/MIN/1.73 M^2
ESTIMATED AVG GLUCOSE: 88 MG/DL
GLUCOSE SERPL-MCNC: 104 MG/DL
HBA1C MFR BLD HPLC: 4.7 %
HCT VFR BLD AUTO: 27.8 %
HGB BLD-MCNC: 8.1 G/DL
IMM GRANULOCYTES # BLD AUTO: 0.01 K/UL
IMM GRANULOCYTES NFR BLD AUTO: 0.1 %
LYMPHOCYTES # BLD AUTO: 1.6 K/UL
LYMPHOCYTES NFR BLD: 23.9 %
MAGNESIUM SERPL-MCNC: 1.7 MG/DL
MCH RBC QN AUTO: 26.8 PG
MCHC RBC AUTO-ENTMCNC: 29.1 G/DL
MCV RBC AUTO: 92 FL
MONOCYTES # BLD AUTO: 0.4 K/UL
MONOCYTES NFR BLD: 5.5 %
NEUTROPHILS # BLD AUTO: 4.6 K/UL
NEUTROPHILS NFR BLD: 67.7 %
NRBC BLD-RTO: 0 /100 WBC
PLATELET # BLD AUTO: 129 K/UL
PMV BLD AUTO: 11.2 FL
POCT GLUCOSE: 100 MG/DL (ref 70–110)
POCT GLUCOSE: 113 MG/DL (ref 70–110)
POCT GLUCOSE: 143 MG/DL (ref 70–110)
POCT GLUCOSE: 205 MG/DL (ref 70–110)
POCT GLUCOSE: 220 MG/DL (ref 70–110)
POTASSIUM SERPL-SCNC: 5.1 MMOL/L
PROT SERPL-MCNC: 6.1 G/DL
RBC # BLD AUTO: 3.02 M/UL
SODIUM SERPL-SCNC: 140 MMOL/L
TACROLIMUS BLD-MCNC: 7.5 NG/ML
TACROLIMUS BLD-MCNC: 7.7 NG/ML
WBC # BLD AUTO: 6.78 K/UL

## 2018-05-26 PROCEDURE — 83735 ASSAY OF MAGNESIUM: CPT

## 2018-05-26 PROCEDURE — 63600175 PHARM REV CODE 636 W HCPCS: Performed by: PHYSICIAN ASSISTANT

## 2018-05-26 PROCEDURE — 87449 NOS EACH ORGANISM AG IA: CPT

## 2018-05-26 PROCEDURE — 25000242 PHARM REV CODE 250 ALT 637 W/ HCPCS: Performed by: INTERNAL MEDICINE

## 2018-05-26 PROCEDURE — 63600175 PHARM REV CODE 636 W HCPCS: Performed by: INTERNAL MEDICINE

## 2018-05-26 PROCEDURE — 83036 HEMOGLOBIN GLYCOSYLATED A1C: CPT

## 2018-05-26 PROCEDURE — 20600001 HC STEP DOWN PRIVATE ROOM

## 2018-05-26 PROCEDURE — 25000003 PHARM REV CODE 250: Performed by: INTERNAL MEDICINE

## 2018-05-26 PROCEDURE — 99233 SBSQ HOSP IP/OBS HIGH 50: CPT | Mod: ,,, | Performed by: INTERNAL MEDICINE

## 2018-05-26 PROCEDURE — 25000242 PHARM REV CODE 250 ALT 637 W/ HCPCS: Performed by: PHYSICIAN ASSISTANT

## 2018-05-26 PROCEDURE — 80197 ASSAY OF TACROLIMUS: CPT

## 2018-05-26 PROCEDURE — 87632 RESP VIRUS 6-11 TARGETS: CPT

## 2018-05-26 PROCEDURE — 25000003 PHARM REV CODE 250: Performed by: PHYSICIAN ASSISTANT

## 2018-05-26 PROCEDURE — 85025 COMPLETE CBC W/AUTO DIFF WBC: CPT

## 2018-05-26 PROCEDURE — 80053 COMPREHEN METABOLIC PANEL: CPT

## 2018-05-26 RX ORDER — FLUTICASONE FUROATE AND VILANTEROL 200; 25 UG/1; UG/1
1 POWDER RESPIRATORY (INHALATION) DAILY
Status: DISCONTINUED | OUTPATIENT
Start: 2018-05-26 | End: 2018-05-26

## 2018-05-26 RX ORDER — FLUTICASONE PROPIONATE AND SALMETEROL 250; 50 UG/1; UG/1
1 POWDER RESPIRATORY (INHALATION) 2 TIMES DAILY
Status: DISCONTINUED | OUTPATIENT
Start: 2018-05-26 | End: 2018-05-31 | Stop reason: HOSPADM

## 2018-05-26 RX ORDER — IBUPROFEN 200 MG
16 TABLET ORAL
Status: DISCONTINUED | OUTPATIENT
Start: 2018-05-26 | End: 2018-05-27

## 2018-05-26 RX ORDER — INSULIN ASPART 100 [IU]/ML
0-5 INJECTION, SOLUTION INTRAVENOUS; SUBCUTANEOUS
Status: DISCONTINUED | OUTPATIENT
Start: 2018-05-26 | End: 2018-05-27

## 2018-05-26 RX ORDER — GABAPENTIN 400 MG/1
800 CAPSULE ORAL ONCE
Status: DISCONTINUED | OUTPATIENT
Start: 2018-05-26 | End: 2018-05-26

## 2018-05-26 RX ORDER — DIPHENHYDRAMINE HYDROCHLORIDE 50 MG/ML
50 INJECTION INTRAMUSCULAR; INTRAVENOUS EVERY 6 HOURS PRN
Status: DISCONTINUED | OUTPATIENT
Start: 2018-05-26 | End: 2018-05-31 | Stop reason: HOSPADM

## 2018-05-26 RX ORDER — GLUCAGON 1 MG
1 KIT INJECTION
Status: DISCONTINUED | OUTPATIENT
Start: 2018-05-26 | End: 2018-05-27

## 2018-05-26 RX ORDER — IBUPROFEN 200 MG
24 TABLET ORAL
Status: DISCONTINUED | OUTPATIENT
Start: 2018-05-26 | End: 2018-05-27

## 2018-05-26 RX ORDER — TOPIRAMATE 25 MG/1
25 TABLET ORAL 2 TIMES DAILY
Status: DISCONTINUED | OUTPATIENT
Start: 2018-05-26 | End: 2018-05-31 | Stop reason: HOSPADM

## 2018-05-26 RX ADMIN — GABAPENTIN 1100 MG: 400 CAPSULE ORAL at 09:05

## 2018-05-26 RX ADMIN — SODIUM CHLORIDE 750 MG: 9 INJECTION, SOLUTION INTRAVENOUS at 05:05

## 2018-05-26 RX ADMIN — LORAZEPAM 1 MG: 1 TABLET ORAL at 10:05

## 2018-05-26 RX ADMIN — MONTELUKAST SODIUM 10 MG: 10 TABLET, FILM COATED ORAL at 09:05

## 2018-05-26 RX ADMIN — DIPHENHYDRAMINE HYDROCHLORIDE 50 MG: 50 INJECTION, SOLUTION INTRAMUSCULAR; INTRAVENOUS at 10:05

## 2018-05-26 RX ADMIN — PREDNISONE 5 MG: 5 TABLET ORAL at 09:05

## 2018-05-26 RX ADMIN — TACROLIMUS 0.5 MG: 0.5 CAPSULE ORAL at 08:05

## 2018-05-26 RX ADMIN — PANCRELIPASE 6 CAPSULE: 24000; 76000; 120000 CAPSULE, DELAYED RELEASE PELLETS ORAL at 05:05

## 2018-05-26 RX ADMIN — FLUTICASONE PROPIONATE AND SALMETEROL 1 PUFF: 50; 250 POWDER RESPIRATORY (INHALATION) at 09:05

## 2018-05-26 RX ADMIN — DAPSONE 100 MG: 100 TABLET ORAL at 09:05

## 2018-05-26 RX ADMIN — OYSTER SHELL CALCIUM WITH VITAMIN D 1 TABLET: 500; 200 TABLET, FILM COATED ORAL at 05:05

## 2018-05-26 RX ADMIN — GABAPENTIN 1100 MG: 400 CAPSULE ORAL at 03:05

## 2018-05-26 RX ADMIN — OXYCODONE HYDROCHLORIDE 10 MG: 5 TABLET ORAL at 12:05

## 2018-05-26 RX ADMIN — TOPIRAMATE 25 MG: 25 TABLET, FILM COATED ORAL at 09:05

## 2018-05-26 RX ADMIN — AMLODIPINE BESYLATE 10 MG: 10 TABLET ORAL at 09:05

## 2018-05-26 RX ADMIN — Medication 400 MG: at 09:05

## 2018-05-26 RX ADMIN — MYCOPHENOLATE MOFETIL 500 MG: 250 CAPSULE ORAL at 09:05

## 2018-05-26 RX ADMIN — CEFEPIME 2 G: 2 INJECTION, POWDER, FOR SOLUTION INTRAVENOUS at 05:05

## 2018-05-26 RX ADMIN — OXYCODONE HYDROCHLORIDE 10 MG: 5 TABLET ORAL at 08:05

## 2018-05-26 RX ADMIN — TACROLIMUS 3 MG: 1 CAPSULE ORAL at 08:05

## 2018-05-26 RX ADMIN — CEFEPIME 2 G: 2 INJECTION, POWDER, FOR SOLUTION INTRAVENOUS at 03:05

## 2018-05-26 RX ADMIN — TIZANIDINE 6 MG: 2 TABLET ORAL at 06:05

## 2018-05-26 RX ADMIN — ONDANSETRON 8 MG: 4 TABLET, FILM COATED ORAL at 12:05

## 2018-05-26 RX ADMIN — MORPHINE SULFATE 15 MG: 15 TABLET, EXTENDED RELEASE ORAL at 03:05

## 2018-05-26 RX ADMIN — CETIRIZINE HYDROCHLORIDE 5 MG: 5 TABLET, FILM COATED ORAL at 09:05

## 2018-05-26 RX ADMIN — PANTOPRAZOLE SODIUM 40 MG: 40 TABLET, DELAYED RELEASE ORAL at 09:05

## 2018-05-26 RX ADMIN — TOPIRAMATE 25 MG: 25 TABLET, FILM COATED ORAL at 10:05

## 2018-05-26 RX ADMIN — OXYCODONE HYDROCHLORIDE 10 MG: 5 TABLET ORAL at 05:05

## 2018-05-26 RX ADMIN — MORPHINE SULFATE 15 MG: 15 TABLET, EXTENDED RELEASE ORAL at 09:05

## 2018-05-26 RX ADMIN — OYSTER SHELL CALCIUM WITH VITAMIN D 1 TABLET: 500; 200 TABLET, FILM COATED ORAL at 08:05

## 2018-05-26 RX ADMIN — DIPHENHYDRAMINE HYDROCHLORIDE 50 MG: 50 INJECTION, SOLUTION INTRAMUSCULAR; INTRAVENOUS at 05:05

## 2018-05-26 RX ADMIN — ARIPIPRAZOLE 2 MG: 2 TABLET ORAL at 09:05

## 2018-05-26 RX ADMIN — MIRTAZAPINE 30 MG: 15 TABLET, FILM COATED ORAL at 09:05

## 2018-05-26 RX ADMIN — TACROLIMUS 3 MG: 1 CAPSULE ORAL at 05:05

## 2018-05-26 RX ADMIN — LISINOPRIL 10 MG: 2.5 TABLET ORAL at 09:05

## 2018-05-26 RX ADMIN — PANCRELIPASE 6 CAPSULE: 24000; 76000; 120000 CAPSULE, DELAYED RELEASE PELLETS ORAL at 08:05

## 2018-05-26 RX ADMIN — FOLIC ACID 1000 MCG: 1 TABLET ORAL at 09:05

## 2018-05-26 RX ADMIN — TACROLIMUS 0.5 MG: 0.5 CAPSULE ORAL at 05:05

## 2018-05-26 RX ADMIN — PANCRELIPASE 6 CAPSULE: 24000; 76000; 120000 CAPSULE, DELAYED RELEASE PELLETS ORAL at 11:05

## 2018-05-26 RX ADMIN — FLUTICASONE PROPIONATE 100 MCG: 50 SPRAY, METERED NASAL at 10:05

## 2018-05-26 RX ADMIN — OXYCODONE HYDROCHLORIDE 10 MG: 5 TABLET ORAL at 10:05

## 2018-05-26 NOTE — PLAN OF CARE
Problem: Patient Care Overview  Goal: Plan of Care Review  Outcome: Ongoing (interventions implemented as appropriate)  POC discussed with Pt.Pt would like to have all home medications ordered.Pt is AAOx3.Pt is ambulatory and independent.Pt able to perform all ADL's without assistance. CBG monitored AC HS.  SS coverage given when appropriate.NAD noted.Pt is in good spirits.  Standard precautions maintained.  No breakdown noted, po fluids encouraged.Pt remains injury and fall free, non skid footwear donned, call light within reach, personal items within reach, bed in low/locked position, pt able to voice needs all needs voiced have been met at this time.

## 2018-05-26 NOTE — CONSULTS
[facility]  Infectious Disease  Consult Note    Patient Name: Juanita Ibarra  MRN: [id number]  Admission Date: 5/25/2018  Hospital Length of Stay: 1 days  Attending Physician: Jake Alvarez MD  Primary Care Provider: Jake Alvarez MD     Isolation Status: Contact    Patient information was obtained from patient and ER records.      Consults  Assessment/Plan:     Pneumonia     27 y/o female with  history of BLT (CMV D+/R-, July 2014) secondary to CF. 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 Now with acute SOB and hypoxemia. CXR unremarkable suggest obtaining CR chest to better visualize lungs. Agree with BAL for cultures bacterial, fungal, and AFB. At this time unclear source. Potentially can be atypical Chlamydia sp, mycoplasma or NTMB, viral. Agree with broad spectrum coverage at this time. Suggest addition of azithromycin to cover for atypical bacteria. Also recommend to order urine legionella antigen . Thanks for the consult will continue to follow progress.     Recommendations:   - Add azythromycin   - obtain chest CT   - obtain urine legionella antigen   - agree with BAL obtain bacterial, fungal, AFB smears and cultures             Thank you for your consult. I will follow-up with patient. Please contact us if you have any additional questions.    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 Patient recently admitted to outside hospital in [place] earlier this week and  received empiric vancomycin and meropenem for RLL pneumonia. Patient notes that 4-5 days ago she noticed increased rhinorrhea, sinus congestion, CARROLL, and dry cough that feels congested. Patient reports O2 sats at home in high 80s upon exertion on room air. Patient notes that on arrival to outside ED her O2 sats were in low 90s. Patient reports that she continues to feel poorly and unimproved. Patient reports baseline nausea but does note increased fatigue over the last week. Patient reports going on a cruise and snorkeling in [place] and [place] earlier this month that may have exposed her to sick contacts. Denies fevers, chills. ID consulted for antibiotic recommendations.    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       Past Surgical History:   Procedure Laterality Date    ABDOMINAL SURGERY      peg tube     CHOLECYSTECTOMY  2016    ENDOMETRIAL ABLATION  2015    KJB    FESS      LARYNX SURGERY  2016    LUNG TRANSPLANT Bilateral 6/12/14    MYRINGOTOMY W/ TUBES Right 04/2017    PORTACATH PLACEMENT Right     rt sc    SALPINGECTOMY Bilateral 2015    KJB       Review of patient's allergies indicates:   Allergen Reactions    Albuterol Palpitations    Colistin Anaphylaxis    Vancomycin analogues      Infusion reaction that does not resolve with slowing    Neupogen [filgrastim] Other (See Comments)     Ostealgia after five daily doses of 300 mcg.      Bactrim [sulfamethoxazole-trimethoprim] Hives    Ceftazidime Hives     Pt stated can tolerate cefapine not ceftazidime    Ceftazidime     Dronabinol Other  (See Comments)     Mental changes/hallucinations    Haldol [haloperidol lactate] Other (See Comments)     Seizure like activity    Nsaids (non-steroidal anti-inflammatory drug)      Cannot have due to lung transplant    Adhesive Rash     Cloth tape- please use tegaderm or paper tape    Aztreonam Rash    Ciprofloxacin Nausea And Vomiting     Projectile N/V, per patient.  Unwilling to retry therapy.       Medications:  Prescriptions Prior to Admission   Medication Sig    amlodipine (NORVASC) 5 MG tablet Take 10 mg by mouth once daily.     aripiprazole (ABILIFY) 2 MG Tab Take 2 mg by mouth once daily.    butalbital-acetaminophen-caffeine -40 mg (FIORICET, ESGIC) -40 mg per tablet TAKE 1 TABLET BY MOUTH EVERY 6 HOURS AS NEEDED FOR HEADACHE    calcium-vitamin D3 500 mg(1,250mg) -200 unit per tablet Take 1 tablet by mouth 2 (two) times daily with meals.    CREON 24,000-76,000 -120,000 unit capsule TAKE 5 CAPSULES BY MOUTH WITH MEALS AND 4 CAPSULES WITH SNACKS EVERYDAY    dapsone 100 MG Tab TAKE 1 TABLET BY MOUTH EVERY DAY    diphenhydrAMINE (BENADRYL) 50 MG tablet Take 1 tablet (50 mg total) by mouth every 6 (six) hours as needed for Itching.    DULERA 100-5 mcg/actuation HFAA INHALE 1 PUFF BY MOUTH TWICE DAILY    fexofenadine (ALLEGRA) 180 MG tablet Take 180 mg by mouth once daily.    fluconazole (DIFLUCAN) 150 MG Tab TAKE 1 TABLET BY MOUTH EVERY WEEK (Patient taking differently: TAKE 1 TABLET BY MOUTH EVERY WEEK PRN VAGINAL YEAST S/S)    fluticasone (FLONASE) 50 mcg/actuation nasal spray INSERT 2 SPRAYS IN EACH NOSTRIL DAILY    folic acid (FOLVITE) 1 MG tablet Take 1 tablet (1,000 mcg total) by mouth once daily.    gabapentin (NEURONTIN) 300 MG capsule Take 1 capsule (300 mg total) by mouth 3 (three) times daily. (Patient taking differently: Take 1,100 mg by mouth 3 (three) times daily. )    insulin glargine, TOUJEO, (TOUJEO) 300 unit/mL (1.5 mL) InPn pen Inject 3 Units into the skin once  daily.     levalbuterol (XOPENEX HFA) 45 mcg/actuation inhaler INHALE 1 TO 2 PUFFS INTO THE LUNGS EVERY 6 HOURS AS NEEDED FOR WHEEZING OR SHORTNESS OF BREATH. USE WITH SPACER.    lisinopril 10 MG tablet Take 10 mg by mouth once daily.    LORazepam (ATIVAN) 1 MG tablet Take 1 mg by mouth every 6 (six) hours as needed for Anxiety.    magnesium oxide (MAG-OX) 400 mg tablet Take 1 tablet (400 mg total) by mouth 2 (two) times daily.    mirtazapine (REMERON) 30 MG tablet Take 30 mg by mouth every evening.    montelukast (SINGULAIR) 10 mg tablet TAKE 1 TABLET BY MOUTH EVERY DAY    morphine (MS CONTIN) 15 MG 12 hr tablet Take 15 mg by mouth 3 (three) times daily.    mv. min cmb#[id number] (AQUADEKS) 100-700-10 mcg-mcg-mg Cap cap Take 1 capsule by mouth 2 (two) times daily.    mycophenolate (CELLCEPT) 250 mg Cap Take 2 capsules (500 mg total) by mouth 2 (two) times daily.    omeprazole (PRILOSEC) 40 MG capsule TAKE 1 CAPSULE BY MOUTH EVERY MORNING    ondansetron (ZOFRAN) 8 MG tablet TAKE 1 TABLET(8 MG) BY MOUTH EVERY 8 HOURS AS NEEDED    oxycodone (ROXICODONE) 5 MG immediate release tablet Take 10 mg by mouth every 4 (four) hours as needed for Pain.    polyethylene glycol (GLYCOLAX) 17 gram PwPk Take 17 g by mouth 2 (two) times daily.    predniSONE (DELTASONE) 5 MG tablet Take 5 mg by mouth once daily.    PROCHAMBER USE AS DIRECTED    promethazine (PHENERGAN) 25 MG tablet TAKE 1 TABLET BY MOUTH EVERY 8 HOURS AS NEEDED FOR NAUSEA    tacrolimus (PROGRAF) 0.5 MG Cap Take 1 capsule (0.5 mg total) by mouth every 12 (twelve) hours.    tacrolimus (PROGRAF) 1 MG Cap Take 3 capsules (3 mg total) by mouth every 12 (twelve) hours. Daily doses: 3.5 mg every 12 hours.    tiZANidine (ZANAFLEX) 4 MG tablet TAKE 2 TABLETS BY MOUTH EVERY 6 HOURS AS NEEDED     Antibiotics     Start     Stop Route Frequency Ordered    05/25/18 1700  vancomycin 750 mg in normal saline 250 mL IVPB (ready to mix system)  (Vancomycin IVPB with  levels panel)      -- IV Every 24 hours (non-standard times) 05/25/18 1522    05/25/18 1630  ceFEPIme injection 2 g      -- IV Every 12 hours (non-standard times) 05/25/18 1523    05/25/18 1445  dapsone tablet 100 mg      -- Oral Daily 05/25/18 1347        Antifungals     None        Antivirals     None           Immunization History   Administered Date(s) Administered    Cytomegalovirus Immune Globulin 06/13/2014       Family History     Problem Relation (Age of Onset)    Drug abuse Maternal Grandfather    Thrombocytopenia Maternal Grandfather        Social History     Social History    Marital status: Single     Spouse name: N/A    Number of children: N/A    Years of education: N/A     Social History Main Topics    Smoking status: Never Smoker    Smokeless tobacco: Never Used    Alcohol use No      Comment: Has not had an alcoholic drink in more than 2 months.    Drug use: No    Sexual activity: Yes     Partners: Male     Birth control/ protection: Implant     Other Topics Concern    Not on file     Social History Narrative    No narrative on file     Review of Systems   Constitutional: Negative for chills, fatigue and fever.   HENT: Positive for rhinorrhea. Negative for sinus pressure, sneezing and sore throat.    Respiratory: Positive for cough and shortness of breath.    Gastrointestinal: Negative for abdominal distention, abdominal pain, diarrhea, nausea and vomiting.     Objective:     Vital Signs (Most Recent):  Temp: 98.1 °F (36.7 °C) (05/26/18 1117)  Pulse: (!) 113 (05/26/18 1117)  Resp: 15 (05/26/18 1117)  BP: 119/64 (05/26/18 1117)  SpO2: (!) 93 % (05/26/18 1117) Vital Signs (24h Range):  Temp:  [96.8 °F (36 °C)-98.8 °F (37.1 °C)] 98.1 °F (36.7 °C)  Pulse:  [] 113  Resp:  [15-18] 15  SpO2:  [93 %-96 %] 93 %  BP: (104-129)/(57-80) 119/64     Weight: 54 kg (119 lb)  Body mass index is 23.24 kg/m².    Estimated Creatinine Clearance: 66.2 mL/min (based on SCr of 0.9 mg/dL).    Physical Exam    Constitutional: She is oriented to person, place, and time. She appears well-developed and well-nourished.   HENT:   Head: Normocephalic and atraumatic.   Eyes: EOM are normal. Pupils are equal, round, and reactive to light.   Neck: Normal range of motion.   Cardiovascular: Normal rate, regular rhythm and normal heart sounds.    Pulmonary/Chest: Effort normal. She has wheezes.   Abdominal: Soft. Bowel sounds are normal. She exhibits no distension. There is no tenderness.   Musculoskeletal: Normal range of motion. She exhibits no edema.   Neurological: She is alert and oriented to person, place, and time.   Skin: No rash noted.       Significant Labs:   Blood Culture:   Recent Labs  Lab 05/25/18  1643   LABBLOO No Growth to date     BMP:   Recent Labs  Lab 05/26/18  0530         K 5.1      CO2 28   BUN 12   CREATININE 0.9   CALCIUM 9.2   MG 1.7     CBC:   Recent Labs  Lab 05/25/18  1346 05/26/18  0530   WBC 6.14 6.78   HGB 8.1* 8.1*   HCT 28.6* 27.8*   * 129*     CMP:   Recent Labs  Lab 05/25/18  1346 05/26/18  0530    140   K 4.4 5.1    108   CO2 25 28   * 104   BUN 11 12   CREATININE 1.0 0.9   CALCIUM 9.3 9.2   PROT 6.3 6.1   ALBUMIN 3.5 3.4*   BILITOT 0.4 0.4   ALKPHOS 138* 130   AST 17 13   ALT 20 18   ANIONGAP 8 4*   EGFRNONAA >60.0 >60.0     Microbiology Results (last 7 days)     Procedure Component Value Units Date/Time    Respiratory Viral Panel by PCR [facility]; Nasal Swab [[id number]] Collected:  05/26/18 1159    Order Status:  Sent Specimen:  Respiratory Updated:  05/26/18 1222    Blood culture [[id number]] Collected:  05/25/18 1643    Order Status:  Completed Specimen:  Blood from Peripheral, Hand, Right Updated:  05/26/18 0315     Blood Culture, Routine No Growth to date    Culture, Respiratory  - Cystic Fibrosis [[id number]]     Order Status:  No result Specimen:  Respiratory from Sputum, Expectorated           Significant Imaging: I have reviewed all pertinent  imaging results/findings within the past 24 hours.

## 2018-05-26 NOTE — ASSESSMENT & PLAN NOTE
Current outpatient regimen: dapsone 100 mg daily.     Continue dapsone for PCP prophylaxis.  Reports severe allergy to Bactrim.

## 2018-05-26 NOTE — ASSESSMENT & PLAN NOTE
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  She is admitted from [place] as a transfer for a possible right sided pneumonia.  Aspergillus, Fungitell, and CMV pending.  Procalcitonin <0.02.  Bronchoscopy from 3/2018 with negative cultures, Aspergillus, CMV, and Respiratory Pathogens Panel.      Will continue empiric coverage for PNA with Vanc/Cefepime.  Continue supplemental oxygen as prescribed.  Will check a nasal swap for RVP.  Consider bronchoscopy next week.  High concerns for progression of her CARLOS EDUARDO.

## 2018-05-26 NOTE — CONSULTS
Consult received. Full note to follow.    Please call for questions.    Thanks,  José Miguel Calvo MD  Infectious Disease Fellow, PGY-4  Pager: [phone]  [facility]

## 2018-05-26 NOTE — HPI
Case of 27 y/o female with  history of BLT (CMV D+/R-, July 2014) secondary to CF. 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 Patient recently admitted to outside hospital in [place] earlier this week and received empiric vancomycin and meropenem for RLL pneumonia. Patient notes that 4-5 days ago she noticed increased rhinorrhea, sinus congestion, CARROLL, and dry cough that feels congested. Patient reports O2 sats at home in high 80s upon exertion on room air. Patient notes that on arrival to outside ED her O2 sats were in low 90s. Patient reports that she continues to feel poorly and unimproved. Patient reports baseline nausea but does note increased fatigue over the last week. Patient reports going on a cruise and snorkeling in [place] and [place] earlier this month that may have exposed her to sick contacts. Denies fevers, chills. ID consulted for antibiotic recommendations.

## 2018-05-26 NOTE — PROGRESS NOTES
[facility]  Lung Transplant  Progress Note - Floor    Patient Name: Juanita Ibarra  MRN: [id number]  Admission Date: 5/25/2018  Hospital Length of Stay: 1 days  Post-Operative Day: 1444  Attending Physician: Jake Alvarez MD  Primary Care Provider: Jake Alvarez MD     Subjective:     Interval History:     No acute events overnight per nursing staff.  Continues to require supplemental oxygen for hypoxemia.    This am, only complains of a mild cough which she is unable to clear the secretions.  Has been unable to provide a sputum sample.    Continuous Infusions:  Scheduled Meds:   amLODIPine  10 mg Oral Daily    ARIPiprazole  2 mg Oral QHS    calcium-vitamin D3  1 tablet Oral BID WM    ceFEPime (MAXIPIME) IVPB  2 g Intravenous Q12H    cetirizine  5 mg Oral Daily    dapsone  100 mg Oral Daily    fluticasone  2 spray Each Nare Daily    fluticasone-salmeterol 250-50 mcg/dose  1 puff Inhalation BID    folic acid  1,000 mcg Oral Daily    gabapentin  1,100 mg Oral TID    lipase-protease-amylase 24,000-76,000-120,000 units  6 capsule Oral TIDWM    lisinopril  10 mg Oral Daily    magnesium oxide  400 mg Oral BID    mirtazapine  30 mg Oral QHS    montelukast  10 mg Oral Daily    morphine  15 mg Oral TID    multivit-min-FA-coenzyme Q10 100-5 mcg-mg  1 tablet Oral BID    mycophenolate  500 mg Oral BID    polyethylene glycol  17 g Oral BID    predniSONE  5 mg Oral Daily    tacrolimus  0.5 mg Oral BID    tacrolimus  3 mg Oral BID    topiramate  25 mg Oral BID    vancomycin (VANCOCIN) IVPB  15 mg/kg Intravenous Q24H     PRN Meds:acetaminophen, albuterol, butalbital-acetaminophen-caffeine -40 mg, dextrose 50%, dextrose 50%, diphenhydrAMINE, glucagon (human recombinant), glucose, glucose, insulin aspart U-100, lipase-protease-amylase 24,000-76,000-120,000 units, LORazepam, ondansetron, oxyCODONE, promethazine, tiZANidine    Review of patient's allergies indicates:    Allergen Reactions    Albuterol Palpitations    Colistin Anaphylaxis    Vancomycin analogues      Infusion reaction that does not resolve with slowing    Neupogen [filgrastim] Other (See Comments)     Ostealgia after five daily doses of 300 mcg.      Bactrim [sulfamethoxazole-trimethoprim] Hives    Ceftazidime Hives     Pt stated can tolerate cefapine not ceftazidime    Ceftazidime     Dronabinol Other (See Comments)     Mental changes/hallucinations    Haldol [haloperidol lactate] Other (See Comments)     Seizure like activity    Nsaids (non-steroidal anti-inflammatory drug)      Cannot have due to lung transplant    Adhesive Rash     Cloth tape- please use tegaderm or paper tape    Aztreonam Rash    Ciprofloxacin Nausea And Vomiting     Projectile N/V, per patient.  Unwilling to retry therapy.       Review of Systems   Constitutional: Positive for activity change and fatigue. Negative for chills and fever.   HENT: Positive for congestion and rhinorrhea. Negative for sinus pressure and sore throat.    Eyes: Negative.    Respiratory: Positive for cough and shortness of breath.    Cardiovascular: Negative for palpitations and leg swelling.   Gastrointestinal: Positive for nausea (baseline). Negative for abdominal pain, diarrhea and vomiting.   Endocrine: Negative.    Genitourinary: Negative for dysuria, frequency and urgency.   Musculoskeletal: Positive for back pain (chronic). Negative for myalgias.   Skin: Negative for color change, pallor and rash.   Allergic/Immunologic: Positive for immunocompromised state.   Neurological: Negative for dizziness, weakness and headaches.   Hematological: Negative.    Psychiatric/Behavioral: Negative for agitation and behavioral problems.     Objective:   Physical Exam   Constitutional: She is oriented to person, place, and time. She appears well-developed and well-nourished. No distress.   HENT:   Head: Normocephalic and atraumatic.   Eyes: Conjunctivae and EOM are  normal.   Neck: Normal range of motion. Neck supple.   Cardiovascular: Normal rate, regular rhythm, normal heart sounds and intact distal pulses.  Exam reveals no gallop and no friction rub.    No murmur heard.  Pulmonary/Chest: Effort normal. No respiratory distress. She has no wheezes. She has no rales.   Neurological: She is alert and oriented to person, place, and time.   Skin: Skin is warm and dry. Capillary refill takes less than 2 seconds. No rash noted. No erythema. No pallor.   Psychiatric: She has a normal mood and affect. Her behavior is normal. Judgment and thought content normal.   Nursing note and vitals reviewed.        Vital Signs (Most Recent):  Temp: 98.1 °F (36.7 °C) (05/26/18 1117)  Pulse: (!) 113 (05/26/18 1117)  Resp: 15 (05/26/18 1117)  BP: 119/64 (05/26/18 1117)  SpO2: (!) 93 % (05/26/18 1117) Vital Signs (24h Range):  Temp:  [96.8 °F (36 °C)-98.8 °F (37.1 °C)] 98.1 °F (36.7 °C)  Pulse:  [] 113  Resp:  [15-18] 15  SpO2:  [93 %-96 %] 93 %  BP: (104-129)/(57-80) 119/64     Weight: 54 kg (119 lb)  Body mass index is 23.24 kg/m².    No intake or output data in the 24 hours ending 05/26/18 1355    Significant Labs:  CBC:    Recent Labs  Lab 05/26/18  0530   WBC 6.78   RBC 3.02*   HGB 8.1*   HCT 27.8*   *   MCV 92   MCH 26.8*   MCHC 29.1*     BMP:    Recent Labs  Lab 05/26/18  0530      K 5.1      CO2 28   BUN 12   CREATININE 0.9   CALCIUM 9.2      Tacrolimus Levels:    Recent Labs  Lab 05/26/18  0530   TACROLIMUS 7.5     Microbiology:  Microbiology Results (last 7 days)     Procedure Component Value Units Date/Time    Respiratory Viral Panel by PCR [facility]; Nasal Swab [[id number]] Collected:  05/26/18 1159    Order Status:  Sent Specimen:  Respiratory Updated:  05/26/18 1222    Blood culture [[id number]] Collected:  05/25/18 1643    Order Status:  Completed Specimen:  Blood from Peripheral, Hand, Right Updated:  05/26/18 0315     Blood Culture, Routine No Growth to date     Culture, Respiratory  - Cystic Fibrosis [[id number]]     Order Status:  No result Specimen:  Respiratory from Sputum, Expectorated           I have reviewed all pertinent labs within the past 24 hours.    Diagnostic Results:  none further      Assessment/Plan:     Lung transplant status, bilateral    BLT (CMV D+/R-) secondary to CF. 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  She is admitted from [place] as a transfer for a possible right sided pneumonia.  Aspergillus, Fungitell, and CMV pending.  Procalcitonin <0.02.  Bronchoscopy from 3/2018 with negative cultures, Aspergillus, CMV, and Respiratory Pathogens Panel.      Will continue empiric coverage for PNA with Vanc/Cefepime.  Continue supplemental oxygen as prescribed.  Will check a nasal swap for RVP.  Consider bronchoscopy next week.  High concerns for progression of her CARLOS EDUARDO.          Immunosuppression    Current outpatient regimen: tacrolimus 3.5 mg BID, prednisone 5 mg.  Not on MMF due to leukopenia in the past.    Will continue with current immunosuppression. Monitor daily tacrolimus levels and dose adjust accordingly.          Prophylactic antibiotic    Current outpatient regimen: dapsone 100 mg daily.     Continue dapsone for PCP prophylaxis.  Reports severe allergy to Bactrim.         CF related Pancreatic insufficiency    Continue current outpatient regimen of Creon.         Chronic pain with opiate use    Will continue current outpatient regimen per patient's pain management physician and PCP.         Pneumonia    Previously admitted earlier this week at outside hospital for RLL PNA.  Procalcitonin negative.  Cultures pending.  PA/Lat CXR done here shows no evidence of pneumonia.      Continue supplemental oxygen and empiric Vanc/Cefepime.  Will consult ENT for continued sinus drainage and history of recurrent Pseudomonas pneumonia.  Will  possibly bronch next week for samples and to rule out rejection.  High suspicion for progression of CARLOS EDUARDO.          Hyperglycemia    BG stable upon admission. Endocrinology consulted, appreciate recs.         Nausea    Baseline nausea controlled with PRN Zofran and Phenergan.         Sinusitis, chronic    History of acute mastoiditis in April 2017. Follows with Dr. Olsen ENT.     Continue with cetirizine and montelukast.  Will check a RVP from nasal swab and consult ENT.  Has a history of recurrent Pseudomonas PNA and continued sinusitis.              Francisca Morin, DO  Lung Transplant  [facility]

## 2018-05-26 NOTE — SUBJECTIVE & OBJECTIVE
Subjective:     Interval History:     No acute events overnight per nursing staff.  Continues to require supplemental oxygen for hypoxemia.    This am, only complains of a mild cough which she is unable to clear the secretions.  Has been unable to provide a sputum sample.    Continuous Infusions:  Scheduled Meds:   amLODIPine  10 mg Oral Daily    ARIPiprazole  2 mg Oral QHS    calcium-vitamin D3  1 tablet Oral BID WM    ceFEPime (MAXIPIME) IVPB  2 g Intravenous Q12H    cetirizine  5 mg Oral Daily    dapsone  100 mg Oral Daily    fluticasone  2 spray Each Nare Daily    fluticasone-salmeterol 250-50 mcg/dose  1 puff Inhalation BID    folic acid  1,000 mcg Oral Daily    gabapentin  1,100 mg Oral TID    lipase-protease-amylase 24,000-76,000-120,000 units  6 capsule Oral TIDWM    lisinopril  10 mg Oral Daily    magnesium oxide  400 mg Oral BID    mirtazapine  30 mg Oral QHS    montelukast  10 mg Oral Daily    morphine  15 mg Oral TID    multivit-min-FA-coenzyme Q10 100-5 mcg-mg  1 tablet Oral BID    mycophenolate  500 mg Oral BID    polyethylene glycol  17 g Oral BID    predniSONE  5 mg Oral Daily    tacrolimus  0.5 mg Oral BID    tacrolimus  3 mg Oral BID    topiramate  25 mg Oral BID    vancomycin (VANCOCIN) IVPB  15 mg/kg Intravenous Q24H     PRN Meds:acetaminophen, albuterol, butalbital-acetaminophen-caffeine -40 mg, dextrose 50%, dextrose 50%, diphenhydrAMINE, glucagon (human recombinant), glucose, glucose, insulin aspart U-100, lipase-protease-amylase 24,000-76,000-120,000 units, LORazepam, ondansetron, oxyCODONE, promethazine, tiZANidine    Review of patient's allergies indicates:   Allergen Reactions    Albuterol Palpitations    Colistin Anaphylaxis    Vancomycin analogues      Infusion reaction that does not resolve with slowing    Neupogen [filgrastim] Other (See Comments)     Ostealgia after five daily doses of 300 mcg.      Bactrim [sulfamethoxazole-trimethoprim] Hives     Ceftazidime Hives     Pt stated can tolerate cefapine not ceftazidime    Ceftazidime     Dronabinol Other (See Comments)     Mental changes/hallucinations    Haldol [haloperidol lactate] Other (See Comments)     Seizure like activity    Nsaids (non-steroidal anti-inflammatory drug)      Cannot have due to lung transplant    Adhesive Rash     Cloth tape- please use tegaderm or paper tape    Aztreonam Rash    Ciprofloxacin Nausea And Vomiting     Projectile N/V, per patient.  Unwilling to retry therapy.       Review of Systems   Constitutional: Positive for activity change and fatigue. Negative for chills and fever.   HENT: Positive for congestion and rhinorrhea. Negative for sinus pressure and sore throat.    Eyes: Negative.    Respiratory: Positive for cough and shortness of breath.    Cardiovascular: Negative for palpitations and leg swelling.   Gastrointestinal: Positive for nausea (baseline). Negative for abdominal pain, diarrhea and vomiting.   Endocrine: Negative.    Genitourinary: Negative for dysuria, frequency and urgency.   Musculoskeletal: Positive for back pain (chronic). Negative for myalgias.   Skin: Negative for color change, pallor and rash.   Allergic/Immunologic: Positive for immunocompromised state.   Neurological: Negative for dizziness, weakness and headaches.   Hematological: Negative.    Psychiatric/Behavioral: Negative for agitation and behavioral problems.     Objective:   Physical Exam   Constitutional: She is oriented to person, place, and time. She appears well-developed and well-nourished. No distress.   HENT:   Head: Normocephalic and atraumatic.   Eyes: Conjunctivae and EOM are normal.   Neck: Normal range of motion. Neck supple.   Cardiovascular: Normal rate, regular rhythm, normal heart sounds and intact distal pulses.  Exam reveals no gallop and no friction rub.    No murmur heard.  Pulmonary/Chest: Effort normal. No respiratory distress. She has no wheezes. She has no rales.    Neurological: She is alert and oriented to person, place, and time.   Skin: Skin is warm and dry. Capillary refill takes less than 2 seconds. No rash noted. No erythema. No pallor.   Psychiatric: She has a normal mood and affect. Her behavior is normal. Judgment and thought content normal.   Nursing note and vitals reviewed.        Vital Signs (Most Recent):  Temp: 98.1 °F (36.7 °C) (05/26/18 1117)  Pulse: (!) 113 (05/26/18 1117)  Resp: 15 (05/26/18 1117)  BP: 119/64 (05/26/18 1117)  SpO2: (!) 93 % (05/26/18 1117) Vital Signs (24h Range):  Temp:  [96.8 °F (36 °C)-98.8 °F (37.1 °C)] 98.1 °F (36.7 °C)  Pulse:  [] 113  Resp:  [15-18] 15  SpO2:  [93 %-96 %] 93 %  BP: (104-129)/(57-80) 119/64     Weight: 54 kg (119 lb)  Body mass index is 23.24 kg/m².    No intake or output data in the 24 hours ending 05/26/18 1355    Significant Labs:  CBC:    Recent Labs  Lab 05/26/18  0530   WBC 6.78   RBC 3.02*   HGB 8.1*   HCT 27.8*   *   MCV 92   MCH 26.8*   MCHC 29.1*     BMP:    Recent Labs  Lab 05/26/18  0530      K 5.1      CO2 28   BUN 12   CREATININE 0.9   CALCIUM 9.2      Tacrolimus Levels:    Recent Labs  Lab 05/26/18  0530   TACROLIMUS 7.5     Microbiology:  Microbiology Results (last 7 days)     Procedure Component Value Units Date/Time    Respiratory Viral Panel by PCR [facility]; Nasal Swab [[id number]] Collected:  05/26/18 1159    Order Status:  Sent Specimen:  Respiratory Updated:  05/26/18 1222    Blood culture [[id number]] Collected:  05/25/18 1643    Order Status:  Completed Specimen:  Blood from Peripheral, Hand, Right Updated:  05/26/18 0315     Blood Culture, Routine No Growth to date    Culture, Respiratory  - Cystic Fibrosis [[id number]]     Order Status:  No result Specimen:  Respiratory from Sputum, Expectorated           I have reviewed all pertinent labs within the past 24 hours.    Diagnostic Results:  none further

## 2018-05-26 NOTE — ASSESSMENT & PLAN NOTE
29 y/o female with  history of BLT (CMV D+/R-, July 2014) secondary to CF. 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 Now with acute SOB and hypoxemia. CXR unremarkable suggest obtaining CR chest to better visualize lungs. Agree with BAL for cultures bacterial, fungal, and AFB. At this time unclear source. Potentially can be atypical Chlamydia sp, mycoplasma or NTMB, viral. Agree with broad spectrum coverage at this time. Suggest addition of azithromycin to cover for atypical bacteria. Also recommend to order urine legionella antigen . Thanks for the consult will continue to follow progress.     Recommendations:   - Add azythromycin   - obtain chest CT   - obtain urine legionella antigen   - agree with BAL obtain bacterial, fungal, AFB smears and cultures

## 2018-05-26 NOTE — SUBJECTIVE & OBJECTIVE
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       Past Surgical History:   Procedure Laterality Date    ABDOMINAL SURGERY      peg tube     CHOLECYSTECTOMY  2016    ENDOMETRIAL ABLATION  2015    [facility]    FESS      LARYNX SURGERY  2016    LUNG TRANSPLANT Bilateral 6/12/14    MYRINGOTOMY W/ TUBES Right 04/2017    PORTACATH PLACEMENT Right     rt sc    SALPINGECTOMY Bilateral 2015    [facility]       Review of patient's allergies indicates:   Allergen Reactions    Albuterol Palpitations    Colistin Anaphylaxis    Vancomycin analogues      Infusion reaction that does not resolve with slowing    Neupogen [filgrastim] Other (See Comments)     Ostealgia after five daily doses of 300 mcg.      Bactrim [sulfamethoxazole-trimethoprim] Hives    Ceftazidime Hives     Pt stated can tolerate cefapine not ceftazidime    Ceftazidime     Dronabinol Other (See Comments)     Mental changes/hallucinations    Haldol [haloperidol lactate] Other (See Comments)     Seizure like activity    Nsaids (non-steroidal anti-inflammatory drug)      Cannot have due to lung transplant    Adhesive Rash     Cloth tape- please use tegaderm or paper tape    Aztreonam Rash    Ciprofloxacin Nausea And Vomiting     Projectile N/V, per patient.  Unwilling to retry therapy.       Medications:  Prescriptions Prior to Admission   Medication Sig    amlodipine (NORVASC) 5 MG tablet Take 10 mg by mouth once daily.     aripiprazole (ABILIFY) 2 MG Tab Take 2 mg by mouth once daily.    butalbital-acetaminophen-caffeine -40 mg (FIORICET, ESGIC) -40 mg  per tablet TAKE 1 TABLET BY MOUTH EVERY 6 HOURS AS NEEDED FOR HEADACHE    calcium-vitamin D3 500 mg(1,250mg) -200 unit per tablet Take 1 tablet by mouth 2 (two) times daily with meals.    CREON 24,000-76,000 -120,000 unit capsule TAKE 5 CAPSULES BY MOUTH WITH MEALS AND 4 CAPSULES WITH SNACKS EVERYDAY    dapsone 100 MG Tab TAKE 1 TABLET BY MOUTH EVERY DAY    diphenhydrAMINE (BENADRYL) 50 MG tablet Take 1 tablet (50 mg total) by mouth every 6 (six) hours as needed for Itching.    DULERA 100-5 mcg/actuation HFAA INHALE 1 PUFF BY MOUTH TWICE DAILY    fexofenadine (ALLEGRA) 180 MG tablet Take 180 mg by mouth once daily.    fluconazole (DIFLUCAN) 150 MG Tab TAKE 1 TABLET BY MOUTH EVERY WEEK (Patient taking differently: TAKE 1 TABLET BY MOUTH EVERY WEEK PRN VAGINAL YEAST S/S)    fluticasone (FLONASE) 50 mcg/actuation nasal spray INSERT 2 SPRAYS IN EACH NOSTRIL DAILY    folic acid (FOLVITE) 1 MG tablet Take 1 tablet (1,000 mcg total) by mouth once daily.    gabapentin (NEURONTIN) 300 MG capsule Take 1 capsule (300 mg total) by mouth 3 (three) times daily. (Patient taking differently: Take 1,100 mg by mouth 3 (three) times daily. )    insulin glargine, TOUJEO, (TOUJEO) 300 unit/mL (1.5 mL) InPn pen Inject 3 Units into the skin once daily.     levalbuterol (XOPENEX HFA) 45 mcg/actuation inhaler INHALE 1 TO 2 PUFFS INTO THE LUNGS EVERY 6 HOURS AS NEEDED FOR WHEEZING OR SHORTNESS OF BREATH. USE WITH SPACER.    lisinopril 10 MG tablet Take 10 mg by mouth once daily.    LORazepam (ATIVAN) 1 MG tablet Take 1 mg by mouth every 6 (six) hours as needed for Anxiety.    magnesium oxide (MAG-OX) 400 mg tablet Take 1 tablet (400 mg total) by mouth 2 (two) times daily.    mirtazapine (REMERON) 30 MG tablet Take 30 mg by mouth every evening.    montelukast (SINGULAIR) 10 mg tablet TAKE 1 TABLET BY MOUTH EVERY DAY    morphine (MS CONTIN) 15 MG 12 hr tablet Take 15 mg by mouth 3 (three) times daily.    mv. min  cmb#[id number] (AQUADEKS) 100-700-10 mcg-mcg-mg Cap cap Take 1 capsule by mouth 2 (two) times daily.    mycophenolate (CELLCEPT) 250 mg Cap Take 2 capsules (500 mg total) by mouth 2 (two) times daily.    omeprazole (PRILOSEC) 40 MG capsule TAKE 1 CAPSULE BY MOUTH EVERY MORNING    ondansetron (ZOFRAN) 8 MG tablet TAKE 1 TABLET(8 MG) BY MOUTH EVERY 8 HOURS AS NEEDED    oxycodone (ROXICODONE) 5 MG immediate release tablet Take 10 mg by mouth every 4 (four) hours as needed for Pain.    polyethylene glycol (GLYCOLAX) 17 gram PwPk Take 17 g by mouth 2 (two) times daily.    predniSONE (DELTASONE) 5 MG tablet Take 5 mg by mouth once daily.    PROCHAMBER USE AS DIRECTED    promethazine (PHENERGAN) 25 MG tablet TAKE 1 TABLET BY MOUTH EVERY 8 HOURS AS NEEDED FOR NAUSEA    tacrolimus (PROGRAF) 0.5 MG Cap Take 1 capsule (0.5 mg total) by mouth every 12 (twelve) hours.    tacrolimus (PROGRAF) 1 MG Cap Take 3 capsules (3 mg total) by mouth every 12 (twelve) hours. Daily doses: 3.5 mg every 12 hours.    tiZANidine (ZANAFLEX) 4 MG tablet TAKE 2 TABLETS BY MOUTH EVERY 6 HOURS AS NEEDED     Antibiotics     Start     Stop Route Frequency Ordered    05/25/18 1700  vancomycin 750 mg in normal saline 250 mL IVPB (ready to mix system)  (Vancomycin IVPB with levels panel)      -- IV Every 24 hours (non-standard times) 05/25/18 1522    05/25/18 1630  ceFEPIme injection 2 g      -- IV Every 12 hours (non-standard times) 05/25/18 1523    05/25/18 1445  dapsone tablet 100 mg      -- Oral Daily 05/25/18 1347        Antifungals     None        Antivirals     None           Immunization History   Administered Date(s) Administered    Cytomegalovirus Immune Globulin 06/13/2014       Family History     Problem Relation (Age of Onset)    Drug abuse Maternal Grandfather    Thrombocytopenia Maternal Grandfather        Social History     Social History    Marital status: Single     Spouse name: N/A    Number of children: N/A    Years of  education: N/A     Social History Main Topics    Smoking status: Never Smoker    Smokeless tobacco: Never Used    Alcohol use No      Comment: Has not had an alcoholic drink in more than 2 months.    Drug use: No    Sexual activity: Yes     Partners: Male     Birth control/ protection: Implant     Other Topics Concern    Not on file     Social History Narrative    No narrative on file     Review of Systems   Constitutional: Negative for chills, fatigue and fever.   HENT: Positive for rhinorrhea. Negative for sinus pressure, sneezing and sore throat.    Respiratory: Positive for cough and shortness of breath.    Gastrointestinal: Negative for abdominal distention, abdominal pain, diarrhea, nausea and vomiting.     Objective:     Vital Signs (Most Recent):  Temp: 98.1 °F (36.7 °C) (05/26/18 1117)  Pulse: (!) 113 (05/26/18 1117)  Resp: 15 (05/26/18 1117)  BP: 119/64 (05/26/18 1117)  SpO2: (!) 93 % (05/26/18 1117) Vital Signs (24h Range):  Temp:  [96.8 °F (36 °C)-98.8 °F (37.1 °C)] 98.1 °F (36.7 °C)  Pulse:  [] 113  Resp:  [15-18] 15  SpO2:  [93 %-96 %] 93 %  BP: (104-129)/(57-80) 119/64     Weight: 54 kg (119 lb)  Body mass index is 23.24 kg/m².    Estimated Creatinine Clearance: 66.2 mL/min (based on SCr of 0.9 mg/dL).    Physical Exam   Constitutional: She is oriented to person, place, and time. She appears well-developed and well-nourished.   HENT:   Head: Normocephalic and atraumatic.   Eyes: EOM are normal. Pupils are equal, round, and reactive to light.   Neck: Normal range of motion.   Cardiovascular: Normal rate, regular rhythm and normal heart sounds.    Pulmonary/Chest: Effort normal. She has wheezes.   Abdominal: Soft. Bowel sounds are normal. She exhibits no distension. There is no tenderness.   Musculoskeletal: Normal range of motion. She exhibits no edema.   Neurological: She is alert and oriented to person, place, and time.   Skin: No rash noted.       Significant Labs:   Blood Culture:    Recent Labs  Lab 05/25/18  1643   LABBLOO No Growth to date     BMP:   Recent Labs  Lab 05/26/18  0530         K 5.1      CO2 28   BUN 12   CREATININE 0.9   CALCIUM 9.2   MG 1.7     CBC:   Recent Labs  Lab 05/25/18  1346 05/26/18  0530   WBC 6.14 6.78   HGB 8.1* 8.1*   HCT 28.6* 27.8*   * 129*     CMP:   Recent Labs  Lab 05/25/18  1346 05/26/18  0530    140   K 4.4 5.1    108   CO2 25 28   * 104   BUN 11 12   CREATININE 1.0 0.9   CALCIUM 9.3 9.2   PROT 6.3 6.1   ALBUMIN 3.5 3.4*   BILITOT 0.4 0.4   ALKPHOS 138* 130   AST 17 13   ALT 20 18   ANIONGAP 8 4*   EGFRNONAA >60.0 >60.0     Microbiology Results (last 7 days)     Procedure Component Value Units Date/Time    Respiratory Viral Panel by PCR [facility]; Nasal Swab [[id number]] Collected:  05/26/18 1159    Order Status:  Sent Specimen:  Respiratory Updated:  05/26/18 1222    Blood culture [[id number]] Collected:  05/25/18 1643    Order Status:  Completed Specimen:  Blood from Peripheral, Hand, Right Updated:  05/26/18 0315     Blood Culture, Routine No Growth to date    Culture, Respiratory  - Cystic Fibrosis [[id number]]     Order Status:  No result Specimen:  Respiratory from Sputum, Expectorated           Significant Imaging: I have reviewed all pertinent imaging results/findings within the past 24 hours.

## 2018-05-26 NOTE — ASSESSMENT & PLAN NOTE
History of acute mastoiditis in April 2017. Follows with Dr. Olsen ENT.     Continue with cetirizine and montelukast.  Will check a RVP from nasal swab and consult ENT.  Has a history of recurrent Pseudomonas PNA and continued sinusitis.

## 2018-05-26 NOTE — ASSESSMENT & PLAN NOTE
Current outpatient regimen: tacrolimus 3.5 mg BID, prednisone 5 mg.  Not on MMF due to leukopenia in the past.    Will continue with current immunosuppression. Monitor daily tacrolimus levels and dose adjust accordingly.

## 2018-05-26 NOTE — ASSESSMENT & PLAN NOTE
Previously admitted earlier this week at outside hospital for RLL PNA.  Procalcitonin negative.  Cultures pending.  PA/Lat CXR done here shows no evidence of pneumonia.      Continue supplemental oxygen and empiric Vanc/Cefepime.  Will consult ENT for continued sinus drainage and history of recurrent Pseudomonas pneumonia.  Will possibly bronch next week for samples and to rule out rejection.  High suspicion for progression of CARLOS EDUARDO.

## 2018-05-27 LAB
ALBUMIN SERPL BCP-MCNC: 3.4 G/DL
ALP SERPL-CCNC: 131 U/L
ALT SERPL W/O P-5'-P-CCNC: 17 U/L
ANION GAP SERPL CALC-SCNC: 6 MMOL/L
AST SERPL-CCNC: 15 U/L
BASOPHILS # BLD AUTO: 0.02 K/UL
BASOPHILS NFR BLD: 0.3 %
BILIRUB SERPL-MCNC: 0.4 MG/DL
BUN SERPL-MCNC: 16 MG/DL
CALCIUM SERPL-MCNC: 9.1 MG/DL
CHLORIDE SERPL-SCNC: 108 MMOL/L
CO2 SERPL-SCNC: 28 MMOL/L
CREAT SERPL-MCNC: 1 MG/DL
DIFFERENTIAL METHOD: ABNORMAL
EOSINOPHIL # BLD AUTO: 0.1 K/UL
EOSINOPHIL NFR BLD: 2 %
ERYTHROCYTE [DISTWIDTH] IN BLOOD BY AUTOMATED COUNT: 16.2 %
EST. GFR  (AFRICAN AMERICAN): >60 ML/MIN/1.73 M^2
EST. GFR  (NON AFRICAN AMERICAN): >60 ML/MIN/1.73 M^2
GLUCOSE SERPL-MCNC: 101 MG/DL
HCT VFR BLD AUTO: 28.4 %
HGB BLD-MCNC: 8.2 G/DL
IMM GRANULOCYTES # BLD AUTO: 0.02 K/UL
IMM GRANULOCYTES NFR BLD AUTO: 0.3 %
LYMPHOCYTES # BLD AUTO: 1.8 K/UL
LYMPHOCYTES NFR BLD: 30.5 %
MAGNESIUM SERPL-MCNC: 1.7 MG/DL
MCH RBC QN AUTO: 26.5 PG
MCHC RBC AUTO-ENTMCNC: 28.9 G/DL
MCV RBC AUTO: 92 FL
MONOCYTES # BLD AUTO: 0.4 K/UL
MONOCYTES NFR BLD: 6 %
NEUTROPHILS # BLD AUTO: 3.6 K/UL
NEUTROPHILS NFR BLD: 60.9 %
NRBC BLD-RTO: 0 /100 WBC
PLATELET # BLD AUTO: 121 K/UL
PMV BLD AUTO: 11 FL
POCT GLUCOSE: 175 MG/DL (ref 70–110)
POCT GLUCOSE: 429 MG/DL (ref 70–110)
POCT GLUCOSE: 471 MG/DL (ref 70–110)
POCT GLUCOSE: 86 MG/DL (ref 70–110)
POTASSIUM SERPL-SCNC: 4.6 MMOL/L
PROT SERPL-MCNC: 6.4 G/DL
RBC # BLD AUTO: 3.09 M/UL
SODIUM SERPL-SCNC: 142 MMOL/L
TACROLIMUS BLD-MCNC: 6.7 NG/ML
VANCOMYCIN TROUGH SERPL-MCNC: 9.5 UG/ML
WBC # BLD AUTO: 5.96 K/UL

## 2018-05-27 PROCEDURE — 87449 NOS EACH ORGANISM AG IA: CPT

## 2018-05-27 PROCEDURE — 25000003 PHARM REV CODE 250: Performed by: INTERNAL MEDICINE

## 2018-05-27 PROCEDURE — 63600175 PHARM REV CODE 636 W HCPCS: Performed by: PHYSICIAN ASSISTANT

## 2018-05-27 PROCEDURE — 25000242 PHARM REV CODE 250 ALT 637 W/ HCPCS: Performed by: PHYSICIAN ASSISTANT

## 2018-05-27 PROCEDURE — 87205 SMEAR GRAM STAIN: CPT

## 2018-05-27 PROCEDURE — 25000003 PHARM REV CODE 250: Performed by: PHYSICIAN ASSISTANT

## 2018-05-27 PROCEDURE — 83735 ASSAY OF MAGNESIUM: CPT

## 2018-05-27 PROCEDURE — 80197 ASSAY OF TACROLIMUS: CPT

## 2018-05-27 PROCEDURE — 87305 ASPERGILLUS AG IA: CPT

## 2018-05-27 PROCEDURE — 87070 CULTURE OTHR SPECIMN AEROBIC: CPT

## 2018-05-27 PROCEDURE — 80053 COMPREHEN METABOLIC PANEL: CPT

## 2018-05-27 PROCEDURE — 63600175 PHARM REV CODE 636 W HCPCS: Performed by: INTERNAL MEDICINE

## 2018-05-27 PROCEDURE — 85025 COMPLETE CBC W/AUTO DIFF WBC: CPT

## 2018-05-27 PROCEDURE — 99232 SBSQ HOSP IP/OBS MODERATE 35: CPT | Mod: ,,, | Performed by: INTERNAL MEDICINE

## 2018-05-27 PROCEDURE — 80202 ASSAY OF VANCOMYCIN: CPT

## 2018-05-27 PROCEDURE — 20600001 HC STEP DOWN PRIVATE ROOM

## 2018-05-27 RX ORDER — HEPARIN 100 UNIT/ML
5 SYRINGE INTRAVENOUS
Status: DISCONTINUED | OUTPATIENT
Start: 2018-05-27 | End: 2018-05-31 | Stop reason: HOSPADM

## 2018-05-27 RX ORDER — IBUPROFEN 200 MG
24 TABLET ORAL
Status: DISCONTINUED | OUTPATIENT
Start: 2018-05-27 | End: 2018-05-31 | Stop reason: HOSPADM

## 2018-05-27 RX ORDER — GLUCAGON 1 MG
1 KIT INJECTION
Status: DISCONTINUED | OUTPATIENT
Start: 2018-05-27 | End: 2018-05-31 | Stop reason: HOSPADM

## 2018-05-27 RX ORDER — INSULIN ASPART 100 [IU]/ML
1-10 INJECTION, SOLUTION INTRAVENOUS; SUBCUTANEOUS
Status: DISCONTINUED | OUTPATIENT
Start: 2018-05-27 | End: 2018-05-31

## 2018-05-27 RX ORDER — RIBAVIRIN 200 MG/1
400 CAPSULE ORAL 3 TIMES DAILY
Status: DISCONTINUED | OUTPATIENT
Start: 2018-05-27 | End: 2018-05-31 | Stop reason: HOSPADM

## 2018-05-27 RX ORDER — LIDOCAINE AND PRILOCAINE 25; 25 MG/G; MG/G
CREAM TOPICAL
Status: DISCONTINUED | OUTPATIENT
Start: 2018-05-27 | End: 2018-05-31 | Stop reason: HOSPADM

## 2018-05-27 RX ORDER — FLUCONAZOLE 150 MG/1
150 TABLET ORAL ONCE
Status: COMPLETED | OUTPATIENT
Start: 2018-05-27 | End: 2018-05-27

## 2018-05-27 RX ORDER — BUTALBITAL, ACETAMINOPHEN AND CAFFEINE 50; 325; 40 MG/1; MG/1; MG/1
1 TABLET ORAL EVERY 4 HOURS PRN
Status: DISCONTINUED | OUTPATIENT
Start: 2018-05-27 | End: 2018-05-31 | Stop reason: HOSPADM

## 2018-05-27 RX ORDER — INSULIN ASPART 100 [IU]/ML
5 INJECTION, SOLUTION INTRAVENOUS; SUBCUTANEOUS
Status: DISCONTINUED | OUTPATIENT
Start: 2018-05-27 | End: 2018-05-28

## 2018-05-27 RX ORDER — IBUPROFEN 200 MG
16 TABLET ORAL
Status: DISCONTINUED | OUTPATIENT
Start: 2018-05-27 | End: 2018-05-31 | Stop reason: HOSPADM

## 2018-05-27 RX ADMIN — DIPHENHYDRAMINE HYDROCHLORIDE 50 MG: 50 INJECTION, SOLUTION INTRAMUSCULAR; INTRAVENOUS at 06:05

## 2018-05-27 RX ADMIN — GABAPENTIN 1100 MG: 400 CAPSULE ORAL at 09:05

## 2018-05-27 RX ADMIN — Medication 400 MG: at 08:05

## 2018-05-27 RX ADMIN — INSULIN ASPART 7 UNITS: 100 INJECTION, SOLUTION INTRAVENOUS; SUBCUTANEOUS at 08:05

## 2018-05-27 RX ADMIN — HEPARIN 500 UNITS: 100 SYRINGE at 04:05

## 2018-05-27 RX ADMIN — OYSTER SHELL CALCIUM WITH VITAMIN D 1 TABLET: 500; 200 TABLET, FILM COATED ORAL at 06:05

## 2018-05-27 RX ADMIN — MORPHINE SULFATE 15 MG: 15 TABLET, EXTENDED RELEASE ORAL at 02:05

## 2018-05-27 RX ADMIN — TIZANIDINE 6 MG: 2 TABLET ORAL at 06:05

## 2018-05-27 RX ADMIN — OYSTER SHELL CALCIUM WITH VITAMIN D 1 TABLET: 500; 200 TABLET, FILM COATED ORAL at 08:05

## 2018-05-27 RX ADMIN — FLUTICASONE PROPIONATE AND SALMETEROL 1 PUFF: 50; 250 POWDER RESPIRATORY (INHALATION) at 09:05

## 2018-05-27 RX ADMIN — INSULIN ASPART 5 UNITS: 100 INJECTION, SOLUTION INTRAVENOUS; SUBCUTANEOUS at 08:05

## 2018-05-27 RX ADMIN — FLUCONAZOLE 150 MG: 150 TABLET ORAL at 06:05

## 2018-05-27 RX ADMIN — SODIUM CHLORIDE 750 MG: 9 INJECTION, SOLUTION INTRAVENOUS at 06:05

## 2018-05-27 RX ADMIN — TACROLIMUS 0.5 MG: 0.5 CAPSULE ORAL at 08:05

## 2018-05-27 RX ADMIN — GABAPENTIN 1100 MG: 400 CAPSULE ORAL at 02:05

## 2018-05-27 RX ADMIN — TOPIRAMATE 25 MG: 25 TABLET, FILM COATED ORAL at 09:05

## 2018-05-27 RX ADMIN — Medication 400 MG: at 09:05

## 2018-05-27 RX ADMIN — ALBUTEROL SULFATE 1 PUFF: 90 AEROSOL, METERED RESPIRATORY (INHALATION) at 08:05

## 2018-05-27 RX ADMIN — RIBAVIRIN 400 MG: 200 CAPSULE ORAL at 09:05

## 2018-05-27 RX ADMIN — TACROLIMUS 3 MG: 1 CAPSULE ORAL at 06:05

## 2018-05-27 RX ADMIN — PREDNISONE 5 MG: 5 TABLET ORAL at 08:05

## 2018-05-27 RX ADMIN — MORPHINE SULFATE 15 MG: 15 TABLET, EXTENDED RELEASE ORAL at 09:05

## 2018-05-27 RX ADMIN — DAPSONE 100 MG: 100 TABLET ORAL at 08:05

## 2018-05-27 RX ADMIN — DIPHENHYDRAMINE HYDROCHLORIDE 50 MG: 50 INJECTION, SOLUTION INTRAMUSCULAR; INTRAVENOUS at 12:05

## 2018-05-27 RX ADMIN — INSULIN ASPART 10 UNITS: 100 INJECTION, SOLUTION INTRAVENOUS; SUBCUTANEOUS at 06:05

## 2018-05-27 RX ADMIN — FLUTICASONE PROPIONATE AND SALMETEROL 1 PUFF: 50; 250 POWDER RESPIRATORY (INHALATION) at 08:05

## 2018-05-27 RX ADMIN — CETIRIZINE HYDROCHLORIDE 5 MG: 5 TABLET, FILM COATED ORAL at 08:05

## 2018-05-27 RX ADMIN — OXYCODONE HYDROCHLORIDE 10 MG: 5 TABLET ORAL at 10:05

## 2018-05-27 RX ADMIN — TACROLIMUS 0.5 MG: 0.5 CAPSULE ORAL at 06:05

## 2018-05-27 RX ADMIN — MYCOPHENOLATE MOFETIL 500 MG: 250 CAPSULE ORAL at 08:05

## 2018-05-27 RX ADMIN — PANCRELIPASE 6 CAPSULE: 24000; 76000; 120000 CAPSULE, DELAYED RELEASE PELLETS ORAL at 12:05

## 2018-05-27 RX ADMIN — LORAZEPAM 1 MG: 1 TABLET ORAL at 12:05

## 2018-05-27 RX ADMIN — CEFEPIME 2 G: 2 INJECTION, POWDER, FOR SOLUTION INTRAVENOUS at 03:05

## 2018-05-27 RX ADMIN — TOPIRAMATE 25 MG: 25 TABLET, FILM COATED ORAL at 08:05

## 2018-05-27 RX ADMIN — ARIPIPRAZOLE 2 MG: 2 TABLET ORAL at 09:05

## 2018-05-27 RX ADMIN — MYCOPHENOLATE MOFETIL 500 MG: 250 CAPSULE ORAL at 09:05

## 2018-05-27 RX ADMIN — PANCRELIPASE 6 CAPSULE: 24000; 76000; 120000 CAPSULE, DELAYED RELEASE PELLETS ORAL at 08:05

## 2018-05-27 RX ADMIN — OXYCODONE HYDROCHLORIDE 10 MG: 5 TABLET ORAL at 08:05

## 2018-05-27 RX ADMIN — GABAPENTIN 1100 MG: 400 CAPSULE ORAL at 08:05

## 2018-05-27 RX ADMIN — TACROLIMUS 3 MG: 1 CAPSULE ORAL at 08:05

## 2018-05-27 RX ADMIN — CEFEPIME 2 G: 2 INJECTION, POWDER, FOR SOLUTION INTRAVENOUS at 04:05

## 2018-05-27 RX ADMIN — PANCRELIPASE 6 CAPSULE: 24000; 76000; 120000 CAPSULE, DELAYED RELEASE PELLETS ORAL at 06:05

## 2018-05-27 RX ADMIN — MIRTAZAPINE 30 MG: 15 TABLET, FILM COATED ORAL at 09:05

## 2018-05-27 RX ADMIN — OXYCODONE HYDROCHLORIDE 10 MG: 5 TABLET ORAL at 06:05

## 2018-05-27 RX ADMIN — RIBAVIRIN 400 MG: 200 CAPSULE ORAL at 02:05

## 2018-05-27 RX ADMIN — OXYCODONE HYDROCHLORIDE 10 MG: 5 TABLET ORAL at 03:05

## 2018-05-27 RX ADMIN — FOLIC ACID 1000 MCG: 1 TABLET ORAL at 08:05

## 2018-05-27 RX ADMIN — LORAZEPAM 1.5 MG: 0.5 TABLET ORAL at 06:05

## 2018-05-27 RX ADMIN — ACETAMINOPHEN 1000 MG: 500 TABLET, FILM COATED ORAL at 09:05

## 2018-05-27 RX ADMIN — MONTELUKAST SODIUM 10 MG: 10 TABLET, FILM COATED ORAL at 08:05

## 2018-05-27 RX ADMIN — METHYLPREDNISOLONE SODIUM SUCCINATE: 1 INJECTION, POWDER, LYOPHILIZED, FOR SOLUTION INTRAMUSCULAR; INTRAVENOUS at 02:05

## 2018-05-27 RX ADMIN — FLUTICASONE PROPIONATE 100 MCG: 50 SPRAY, METERED NASAL at 08:05

## 2018-05-27 NOTE — SUBJECTIVE & OBJECTIVE
Interval HPI:   Overnight events: remains in TSU. Solumedrol 500 mg x2 given today (will receive total of 3 doses)  Eatin% and outside snacking   Nausea: No  Hypoglycemia and intervention: No  Fever: No  TPN and/or TF: No    /67 (Patient Position: Lying)   Pulse 96   Temp 98.9 °F (37.2 °C) (Oral)   Resp 16   Ht 5' (1.524 m)   Wt 54 kg (119 lb)   LMP 10/02/2016   SpO2 (!) 93%   BMI 23.24 kg/m²     Labs Reviewed and Include      Recent Labs  Lab 18  0530      CALCIUM 9.1   ALBUMIN 3.4*   PROT 6.4      K 4.6   CO2 28      BUN 16   CREATININE 1.0   ALKPHOS 131   ALT 17   AST 15   BILITOT 0.4     Lab Results   Component Value Date    WBC 5.96 2018    HGB 8.2 (L) 2018    HCT 28.4 (L) 2018    MCV 92 2018     (L) 2018     No results for input(s): TSH, FREET4 in the last 168 hours.  Lab Results   Component Value Date    HGBA1C 4.7 2018       Nutritional status:   Body mass index is 23.24 kg/m².  Lab Results   Component Value Date    ALBUMIN 3.4 (L) 2018    ALBUMIN 3.4 (L) 2018    ALBUMIN 3.5 2018     Lab Results   Component Value Date    PREALBUMIN 15 (L) 2014    PREALBUMIN 11 (L) 2014    PREALBUMIN 18 (L) 2014       Estimated Creatinine Clearance: 59.6 mL/min (based on SCr of 1 mg/dL).    Accu-Checks  Recent Labs      18   2359  18   0809  18   1158  18   1758  18   2300  18   0834  18   1242   POCTGLUCOSE  220*  100  143*  205*  113*  86  175*       Current Medications and/or Treatments Impacting Glycemic Control  Immunotherapy:  Immunosuppressants         Stop Route Frequency     mycophenolate capsule 500 mg      -- Oral 2 times daily     tacrolimus capsule 0.5 mg      -- Oral 2 times daily     tacrolimus capsule 3 mg      -- Oral 2 times daily        Steroids:   Hormones     Start     Stop Route Frequency Ordered    18 1313  methylPREDNISolone sodium  succinate (SOLU-MEDROL) 500 mg in dextrose 5 % 100 mL IVPB      05/30 0859 IV Daily 05/27/18 1200        Pressors:    Autonomic Drugs     None        Hyperglycemia/Diabetes Medications: Antihyperglycemics     Start     Stop Route Frequency Ordered    05/26/18 0902  insulin aspart U-100 pen 0-5 Units      -- SubQ Before meals & nightly PRN 05/26/18 0802

## 2018-05-27 NOTE — PLAN OF CARE
Problem: Patient Care Overview  Goal: Plan of Care Review  Outcome: Ongoing (interventions implemented as appropriate)  Pt is AAOx3.Pt able to perform all ADL's without assistance. CBG monitored AC HS.  SS coverage given when appropriate.NAD noted.Pt turns independently, pt is aware of bony area and pressure reduction positions Pt remains injury and fall free, non skid footwear donned, call light within reach, personal items within reach, bed in low/locked position, pt able to voice needs all needs voiced have been met at this time.

## 2018-05-27 NOTE — ASSESSMENT & PLAN NOTE
History of acute mastoiditis in April 2017. Follows with Dr. Olsen ENT.     Continue with cetirizine and montelukast.  RVP from nasal swab pending.  ENT consulted and appreciate recs.  Has a history of recurrent Pseudomonas PNA and continued sinusitis.

## 2018-05-27 NOTE — PROGRESS NOTES
[facility]  Lung Transplant  Progress Note - Floor    Patient Name: Juanita Ibarra  MRN: [id number]  Admission Date: 5/25/2018  Hospital Length of Stay: 2 days  Post-Operative Day: 1445  Attending Physician: Jake Alvarez MD  Primary Care Provider: Jake Alvarez MD     Subjective:     Interval History:     Overnight, complained of increased ear pain/fullness after doing a saline sinus rinse.  Was noted to have purulent nasal discharge.  ENT has evaluated this am.      This am, continues to complain of nasal and chest congestion.  Unable to clear secretions with cough.  Complains of migraine HA.  Complains of wheezing overnight.      Continuous Infusions:  Scheduled Meds:   amLODIPine  10 mg Oral Daily    ARIPiprazole  2 mg Oral QHS    calcium-vitamin D3  1 tablet Oral BID WM    ceFEPime (MAXIPIME) IVPB  2 g Intravenous Q12H    cetirizine  5 mg Oral Daily    dapsone  100 mg Oral Daily    fluticasone  2 spray Each Nare Daily    fluticasone-salmeterol 250-50 mcg/dose  1 puff Inhalation BID    folic acid  1,000 mcg Oral Daily    gabapentin  1,100 mg Oral TID    lipase-protease-amylase 24,000-76,000-120,000 units  6 capsule Oral TIDWM    lisinopril  10 mg Oral Daily    magnesium oxide  400 mg Oral BID    methylPREDNISolone (SOLU-Medrol) IVPB (doses > 250 mg)   Intravenous Daily    mirtazapine  30 mg Oral QHS    montelukast  10 mg Oral Daily    morphine  15 mg Oral TID    multivit-min-FA-coenzyme Q10 100-5 mcg-mg  1 tablet Oral BID    mycophenolate  500 mg Oral BID    polyethylene glycol  17 g Oral BID    ribavirin  400 mg Oral TID    tacrolimus  0.5 mg Oral BID    tacrolimus  3 mg Oral BID    topiramate  25 mg Oral BID    vancomycin (VANCOCIN) IVPB  15 mg/kg Intravenous Q24H     PRN Meds:acetaminophen, albuterol, butalbital-acetaminophen-caffeine -40 mg, butalbital-acetaminophen-caffeine -40 mg, dextrose 50%, dextrose 50%, diphenhydrAMINE,  glucagon (human recombinant), glucose, glucose, insulin aspart U-100, lipase-protease-amylase 24,000-76,000-120,000 units, LORazepam, ondansetron, oxyCODONE, promethazine, tiZANidine    Review of patient's allergies indicates:   Allergen Reactions    Albuterol Palpitations    Colistin Anaphylaxis    Vancomycin analogues      Infusion reaction that does not resolve with slowing    Neupogen [filgrastim] Other (See Comments)     Ostealgia after five daily doses of 300 mcg.      Bactrim [sulfamethoxazole-trimethoprim] Hives    Ceftazidime Hives     Pt stated can tolerate cefapine not ceftazidime    Ceftazidime     Dronabinol Other (See Comments)     Mental changes/hallucinations    Haldol [haloperidol lactate] Other (See Comments)     Seizure like activity    Nsaids (non-steroidal anti-inflammatory drug)      Cannot have due to lung transplant    Adhesive Rash     Cloth tape- please use tegaderm or paper tape    Aztreonam Rash    Ciprofloxacin Nausea And Vomiting     Projectile N/V, per patient.  Unwilling to retry therapy.       Review of Systems   Constitutional: Positive for activity change and fatigue. Negative for chills and fever.   HENT: Positive for congestion, ear pain and rhinorrhea. Negative for sinus pressure and sore throat.    Eyes: Negative.    Respiratory: Positive for cough and shortness of breath.    Cardiovascular: Negative for palpitations and leg swelling.   Gastrointestinal: Positive for nausea (baseline). Negative for abdominal pain, diarrhea and vomiting.   Endocrine: Negative.    Genitourinary: Negative for dysuria, frequency and urgency.   Musculoskeletal: Positive for back pain (chronic). Negative for myalgias.   Skin: Negative for color change, pallor and rash.   Allergic/Immunologic: Positive for immunocompromised state.   Neurological: Negative for dizziness, weakness and headaches.   Hematological: Negative.    Psychiatric/Behavioral: Negative for agitation and behavioral  problems.     Objective:   Physical Exam   Constitutional: She is oriented to person, place, and time. She appears well-developed and well-nourished. No distress.   HENT:   Head: Normocephalic and atraumatic.   Eyes: Conjunctivae and EOM are normal.   Neck: Normal range of motion. Neck supple.   Cardiovascular: Normal rate, regular rhythm, normal heart sounds and intact distal pulses.  Exam reveals no gallop and no friction rub.    No murmur heard.  Pulmonary/Chest: Effort normal. No respiratory distress. She has no wheezes. She has no rales.   Neurological: She is alert and oriented to person, place, and time.   Skin: Skin is warm and dry. Capillary refill takes less than 2 seconds. No rash noted. No erythema. No pallor.   Psychiatric: She has a normal mood and affect. Her behavior is normal. Judgment and thought content normal.   Nursing note and vitals reviewed.        Vital Signs (Most Recent):  Temp: 97.8 °F (36.6 °C) (05/27/18 0724)  Pulse: 101 (05/27/18 1112)  Resp: 13 (05/27/18 1112)  BP: 122/71 (05/27/18 1112)  SpO2: (!) 94 % (05/27/18 1112) Vital Signs (24h Range):  Temp:  [97.8 °F (36.6 °C)-98.6 °F (37 °C)] 97.8 °F (36.6 °C)  Pulse:  [] 101  Resp:  [13-19] 13  SpO2:  [90 %-95 %] 94 %  BP: ()/(50-72) 122/71     Weight: 54 kg (119 lb)  Body mass index is 23.24 kg/m².      Intake/Output Summary (Last 24 hours) at 05/27/18 1219  Last data filed at 05/26/18 1800   Gross per 24 hour   Intake              300 ml   Output                0 ml   Net              300 ml       Significant Labs:  CBC:    Recent Labs  Lab 05/27/18  0530   WBC 5.96   RBC 3.09*   HGB 8.2*   HCT 28.4*   *   MCV 92   MCH 26.5*   MCHC 28.9*     BMP:    Recent Labs  Lab 05/27/18  0530      K 4.6      CO2 28   BUN 16   CREATININE 1.0   CALCIUM 9.1      Tacrolimus Levels:    Recent Labs  Lab 05/27/18  0530   TACROLIMUS 6.7     Microbiology:  Microbiology Results (last 7 days)     Procedure Component Value Units  Date/Time    Blood culture [[id number]] Collected:  05/25/18 1643    Order Status:  Completed Specimen:  Blood from Peripheral, Hand, Right Updated:  05/26/18 2212     Blood Culture, Routine No Growth to date     Blood Culture, Routine No Growth to date    Respiratory Viral Panel by PCR [facility]; Nasal Swab [[id number]] Collected:  05/26/18 1159    Order Status:  Sent Specimen:  Respiratory Updated:  05/26/18 1222    Culture, Respiratory  - Cystic Fibrosis [[id number]]     Order Status:  No result Specimen:  Respiratory from Sputum, Expectorated           I have reviewed all pertinent labs within the past 24 hours.    Diagnostic Results:  none further      Assessment/Plan:     * Pneumonia    Previously admitted earlier this week at outside hospital for RLL PNA.  Procalcitonin negative.  Cultures pending.  PA/Lat CXR done here shows no evidence of pneumonia.      Continue supplemental oxygen and empiric Vanc/Cefepime/solumedrol/Ribavirin.  ENT evaluated for recurrent sinusitis.  Plan for bronchoscopy on Tuesday to rule out rejection.  High suspicion for progression of CARLOS EDUARDO.          Lung transplant status, bilateral    BLT (CMV D+/R-) secondary to CF. 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  She is admitted from [place] as a transfer for a possible right sided pneumonia.  Aspergillus, Fungitell, and CMV pending.  Procalcitonin <0.02.  Bronchoscopy from 3/2018 with negative cultures, Aspergillus, CMV, and Respiratory Pathogens Panel.      Will continue empiric coverage for PNA with Vanc/Cefepime.  Start Solumedrol 500mg IV once daily for 3 days and Ribavirin 400mg po TID for 10 days to empirically cover respiratory viruses.  Continue supplemental oxygen as prescribed.  Nasal swap for RVP pending.  Plan for bronchoscopy in the OR on Tuesday.  High concerns for progression of her CARLOS EDUARDO.          Immunosuppression     Current outpatient regimen: tacrolimus 3.5 mg BID, prednisone 5 mg.  Not on MMF due to leukopenia in the past.    Will continue with current immunosuppression. Monitor daily tacrolimus levels and dose adjust accordingly.          Prophylactic antibiotic    Current outpatient regimen: dapsone 100 mg daily.     Continue dapsone for PCP prophylaxis.  Reports severe allergy to Bactrim.         CF related Pancreatic insufficiency    Continue current outpatient regimen of Creon.         Chronic pain with opiate use    Will continue current outpatient regimen per patient's pain management physician and PCP.         Hyperglycemia    BG stable upon admission. Endocrinology consulted, appreciate recs.         Nausea    Baseline nausea controlled with PRN Zofran and Phenergan.         Sinusitis, chronic    History of acute mastoiditis in April 2017. Follows with Dr. Olsen ENT.     Continue with cetirizine and montelukast.  RVP from nasal swab pending.  ENT consulted and appreciate recs.  Has a history of recurrent Pseudomonas PNA and continued sinusitis.              Francisca Morin, DO  Lung Transplant  [facility]

## 2018-05-27 NOTE — SUBJECTIVE & OBJECTIVE
Subjective:     Interval History:     Overnight, complained of increased ear pain/fullness after doing a saline sinus rinse.  Was noted to have purulent nasal discharge.  ENT has evaluated this am.      This am, continues to complain of nasal and chest congestion.  Unable to clear secretions with cough.  Complains of migraine HA.  Complains of wheezing overnight.      Continuous Infusions:  Scheduled Meds:   amLODIPine  10 mg Oral Daily    ARIPiprazole  2 mg Oral QHS    calcium-vitamin D3  1 tablet Oral BID WM    ceFEPime (MAXIPIME) IVPB  2 g Intravenous Q12H    cetirizine  5 mg Oral Daily    dapsone  100 mg Oral Daily    fluticasone  2 spray Each Nare Daily    fluticasone-salmeterol 250-50 mcg/dose  1 puff Inhalation BID    folic acid  1,000 mcg Oral Daily    gabapentin  1,100 mg Oral TID    lipase-protease-amylase 24,000-76,000-120,000 units  6 capsule Oral TIDWM    lisinopril  10 mg Oral Daily    magnesium oxide  400 mg Oral BID    methylPREDNISolone (SOLU-Medrol) IVPB (doses > 250 mg)   Intravenous Daily    mirtazapine  30 mg Oral QHS    montelukast  10 mg Oral Daily    morphine  15 mg Oral TID    multivit-min-FA-coenzyme Q10 100-5 mcg-mg  1 tablet Oral BID    mycophenolate  500 mg Oral BID    polyethylene glycol  17 g Oral BID    ribavirin  400 mg Oral TID    tacrolimus  0.5 mg Oral BID    tacrolimus  3 mg Oral BID    topiramate  25 mg Oral BID    vancomycin (VANCOCIN) IVPB  15 mg/kg Intravenous Q24H     PRN Meds:acetaminophen, albuterol, butalbital-acetaminophen-caffeine -40 mg, butalbital-acetaminophen-caffeine -40 mg, dextrose 50%, dextrose 50%, diphenhydrAMINE, glucagon (human recombinant), glucose, glucose, insulin aspart U-100, lipase-protease-amylase 24,000-76,000-120,000 units, LORazepam, ondansetron, oxyCODONE, promethazine, tiZANidine    Review of patient's allergies indicates:   Allergen Reactions    Albuterol Palpitations    Colistin Anaphylaxis    Vancomycin  analogues      Infusion reaction that does not resolve with slowing    Neupogen [filgrastim] Other (See Comments)     Ostealgia after five daily doses of 300 mcg.      Bactrim [sulfamethoxazole-trimethoprim] Hives    Ceftazidime Hives     Pt stated can tolerate cefapine not ceftazidime    Ceftazidime     Dronabinol Other (See Comments)     Mental changes/hallucinations    Haldol [haloperidol lactate] Other (See Comments)     Seizure like activity    Nsaids (non-steroidal anti-inflammatory drug)      Cannot have due to lung transplant    Adhesive Rash     Cloth tape- please use tegaderm or paper tape    Aztreonam Rash    Ciprofloxacin Nausea And Vomiting     Projectile N/V, per patient.  Unwilling to retry therapy.       Review of Systems   Constitutional: Positive for activity change and fatigue. Negative for chills and fever.   HENT: Positive for congestion, ear pain and rhinorrhea. Negative for sinus pressure and sore throat.    Eyes: Negative.    Respiratory: Positive for cough and shortness of breath.    Cardiovascular: Negative for palpitations and leg swelling.   Gastrointestinal: Positive for nausea (baseline). Negative for abdominal pain, diarrhea and vomiting.   Endocrine: Negative.    Genitourinary: Negative for dysuria, frequency and urgency.   Musculoskeletal: Positive for back pain (chronic). Negative for myalgias.   Skin: Negative for color change, pallor and rash.   Allergic/Immunologic: Positive for immunocompromised state.   Neurological: Negative for dizziness, weakness and headaches.   Hematological: Negative.    Psychiatric/Behavioral: Negative for agitation and behavioral problems.     Objective:   Physical Exam   Constitutional: She is oriented to person, place, and time. She appears well-developed and well-nourished. No distress.   HENT:   Head: Normocephalic and atraumatic.   Eyes: Conjunctivae and EOM are normal.   Neck: Normal range of motion. Neck supple.   Cardiovascular: Normal  rate, regular rhythm, normal heart sounds and intact distal pulses.  Exam reveals no gallop and no friction rub.    No murmur heard.  Pulmonary/Chest: Effort normal. No respiratory distress. She has no wheezes. She has no rales.   Neurological: She is alert and oriented to person, place, and time.   Skin: Skin is warm and dry. Capillary refill takes less than 2 seconds. No rash noted. No erythema. No pallor.   Psychiatric: She has a normal mood and affect. Her behavior is normal. Judgment and thought content normal.   Nursing note and vitals reviewed.        Vital Signs (Most Recent):  Temp: 97.8 °F (36.6 °C) (05/27/18 0724)  Pulse: 101 (05/27/18 1112)  Resp: 13 (05/27/18 1112)  BP: 122/71 (05/27/18 1112)  SpO2: (!) 94 % (05/27/18 1112) Vital Signs (24h Range):  Temp:  [97.8 °F (36.6 °C)-98.6 °F (37 °C)] 97.8 °F (36.6 °C)  Pulse:  [] 101  Resp:  [13-19] 13  SpO2:  [90 %-95 %] 94 %  BP: ()/(50-72) 122/71     Weight: 54 kg (119 lb)  Body mass index is 23.24 kg/m².      Intake/Output Summary (Last 24 hours) at 05/27/18 1219  Last data filed at 05/26/18 1800   Gross per 24 hour   Intake              300 ml   Output                0 ml   Net              300 ml       Significant Labs:  CBC:    Recent Labs  Lab 05/27/18  0530   WBC 5.96   RBC 3.09*   HGB 8.2*   HCT 28.4*   *   MCV 92   MCH 26.5*   MCHC 28.9*     BMP:    Recent Labs  Lab 05/27/18  0530      K 4.6      CO2 28   BUN 16   CREATININE 1.0   CALCIUM 9.1      Tacrolimus Levels:    Recent Labs  Lab 05/27/18  0530   TACROLIMUS 6.7     Microbiology:  Microbiology Results (last 7 days)     Procedure Component Value Units Date/Time    Blood culture [[id number]] Collected:  05/25/18 1643    Order Status:  Completed Specimen:  Blood from Peripheral, Hand, Right Updated:  05/26/18 2212     Blood Culture, Routine No Growth to date     Blood Culture, Routine No Growth to date    Respiratory Viral Panel by PCR [facility]; Nasal Swab [[id number]]  Collected:  05/26/18 1159    Order Status:  Sent Specimen:  Respiratory Updated:  05/26/18 1222    Culture, Respiratory  - Cystic Fibrosis [[id number]]     Order Status:  No result Specimen:  Respiratory from Sputum, Expectorated           I have reviewed all pertinent labs within the past 24 hours.    Diagnostic Results:  none further

## 2018-05-27 NOTE — ASSESSMENT & PLAN NOTE
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  She is admitted from [place] as a transfer for a possible right sided pneumonia.  Aspergillus, Fungitell, and CMV pending.  Procalcitonin <0.02.  Bronchoscopy from 3/2018 with negative cultures, Aspergillus, CMV, and Respiratory Pathogens Panel.      Will continue empiric coverage for PNA with Vanc/Cefepime.  Start Solumedrol 500mg IV once daily for 3 days and Ribavirin 400mg po TID for 10 days to empirically cover respiratory viruses.  Continue supplemental oxygen as prescribed.  Nasal swap for RVP pending.  Plan for bronchoscopy in the OR on Tuesday.  High concerns for progression of her CARLOS EDUARDO.

## 2018-05-27 NOTE — TREATMENT PLAN
Notified by nurse of , recheck was 500  Received  mg at 2p  She had no insulin needs prior to steroids     Start novolog 5 units with meals  Moderate correction, which means she will get 10 u correction for her high BG now   Ac/hs/0200

## 2018-05-27 NOTE — ASSESSMENT & PLAN NOTE
Previously admitted earlier this week at outside hospital for RLL PNA.  Procalcitonin negative.  Cultures pending.  PA/Lat CXR done here shows no evidence of pneumonia.      Continue supplemental oxygen and empiric Vanc/Cefepime/solumedrol/Ribavirin.  ENT evaluated for recurrent sinusitis.  Plan for bronchoscopy on Tuesday to rule out rejection.  High suspicion for progression of CARLOS EDUARDO.

## 2018-05-28 ENCOUNTER — ANESTHESIA EVENT (OUTPATIENT)
Dept: SURGERY | Facility: HOSPITAL | Age: 28
DRG: 166 | End: 2018-05-28
Payer: MEDICAID

## 2018-05-28 LAB
ALBUMIN SERPL BCP-MCNC: 3.6 G/DL
ALP SERPL-CCNC: 136 U/L
ALT SERPL W/O P-5'-P-CCNC: 16 U/L
ANION GAP SERPL CALC-SCNC: 8 MMOL/L
AST SERPL-CCNC: 11 U/L
BASOPHILS # BLD AUTO: 0 K/UL
BASOPHILS NFR BLD: 0 %
BILIRUB SERPL-MCNC: 0.4 MG/DL
BUN SERPL-MCNC: 18 MG/DL
CALCIUM SERPL-MCNC: 9.4 MG/DL
CHLORIDE SERPL-SCNC: 110 MMOL/L
CO2 SERPL-SCNC: 27 MMOL/L
CREAT SERPL-MCNC: 1.3 MG/DL
DIFFERENTIAL METHOD: ABNORMAL
EOSINOPHIL # BLD AUTO: 0 K/UL
EOSINOPHIL NFR BLD: 0 %
ERYTHROCYTE [DISTWIDTH] IN BLOOD BY AUTOMATED COUNT: 15.8 %
EST. GFR  (AFRICAN AMERICAN): >60 ML/MIN/1.73 M^2
EST. GFR  (NON AFRICAN AMERICAN): 55.6 ML/MIN/1.73 M^2
GLUCOSE SERPL-MCNC: 162 MG/DL
GLUCOSE SERPL-MCNC: 429 MG/DL
GLUCOSE SERPL-MCNC: 429 MG/DL
HCT VFR BLD AUTO: 28.7 %
HGB BLD-MCNC: 8.3 G/DL
IMM GRANULOCYTES # BLD AUTO: 0.04 K/UL
IMM GRANULOCYTES NFR BLD AUTO: 0.5 %
LYMPHOCYTES # BLD AUTO: 0.4 K/UL
LYMPHOCYTES NFR BLD: 5.1 %
MAGNESIUM SERPL-MCNC: 2 MG/DL
MCH RBC QN AUTO: 26.6 PG
MCHC RBC AUTO-ENTMCNC: 28.9 G/DL
MCV RBC AUTO: 92 FL
MONOCYTES # BLD AUTO: 0.1 K/UL
MONOCYTES NFR BLD: 1 %
NEUTROPHILS # BLD AUTO: 7.4 K/UL
NEUTROPHILS NFR BLD: 93.4 %
NRBC BLD-RTO: 0 /100 WBC
PLATELET # BLD AUTO: 132 K/UL
PMV BLD AUTO: 10.9 FL
POCT GLUCOSE: 125 MG/DL (ref 70–110)
POCT GLUCOSE: 371 MG/DL (ref 70–110)
POCT GLUCOSE: 393 MG/DL (ref 70–110)
POCT GLUCOSE: 424 MG/DL (ref 70–110)
POCT GLUCOSE: 470 MG/DL (ref 70–110)
POCT GLUCOSE: 500 MG/DL (ref 70–110)
POCT GLUCOSE: >500 MG/DL (ref 70–110)
POTASSIUM SERPL-SCNC: 4.2 MMOL/L
PROT SERPL-MCNC: 7.2 G/DL
RBC # BLD AUTO: 3.12 M/UL
SODIUM SERPL-SCNC: 145 MMOL/L
TACROLIMUS BLD-MCNC: 10 NG/ML
TROPONIN I SERPL DL<=0.01 NG/ML-MCNC: 0.01 NG/ML
WBC # BLD AUTO: 7.87 K/UL

## 2018-05-28 PROCEDURE — 87070 CULTURE OTHR SPECIMN AEROBIC: CPT

## 2018-05-28 PROCEDURE — 84681 ASSAY OF C-PEPTIDE: CPT

## 2018-05-28 PROCEDURE — 25000003 PHARM REV CODE 250: Performed by: INTERNAL MEDICINE

## 2018-05-28 PROCEDURE — 63600175 PHARM REV CODE 636 W HCPCS: Performed by: INTERNAL MEDICINE

## 2018-05-28 PROCEDURE — 93005 ELECTROCARDIOGRAM TRACING: CPT

## 2018-05-28 PROCEDURE — 25000003 PHARM REV CODE 250: Performed by: STUDENT IN AN ORGANIZED HEALTH CARE EDUCATION/TRAINING PROGRAM

## 2018-05-28 PROCEDURE — 82947 ASSAY GLUCOSE BLOOD QUANT: CPT

## 2018-05-28 PROCEDURE — 83735 ASSAY OF MAGNESIUM: CPT

## 2018-05-28 PROCEDURE — 25000003 PHARM REV CODE 250: Performed by: PHYSICIAN ASSISTANT

## 2018-05-28 PROCEDURE — 87205 SMEAR GRAM STAIN: CPT

## 2018-05-28 PROCEDURE — 80053 COMPREHEN METABOLIC PANEL: CPT

## 2018-05-28 PROCEDURE — 99232 SBSQ HOSP IP/OBS MODERATE 35: CPT | Mod: ,,, | Performed by: INTERNAL MEDICINE

## 2018-05-28 PROCEDURE — 85025 COMPLETE CBC W/AUTO DIFF WBC: CPT

## 2018-05-28 PROCEDURE — 99232 SBSQ HOSP IP/OBS MODERATE 35: CPT | Mod: ,,, | Performed by: NURSE PRACTITIONER

## 2018-05-28 PROCEDURE — 63600175 PHARM REV CODE 636 W HCPCS: Performed by: PHYSICIAN ASSISTANT

## 2018-05-28 PROCEDURE — 20600001 HC STEP DOWN PRIVATE ROOM

## 2018-05-28 PROCEDURE — 93010 ELECTROCARDIOGRAM REPORT: CPT | Mod: ,,, | Performed by: INTERNAL MEDICINE

## 2018-05-28 PROCEDURE — 84484 ASSAY OF TROPONIN QUANT: CPT

## 2018-05-28 PROCEDURE — 80197 ASSAY OF TACROLIMUS: CPT

## 2018-05-28 RX ORDER — INSULIN ASPART 100 [IU]/ML
10 INJECTION, SOLUTION INTRAVENOUS; SUBCUTANEOUS
Status: DISCONTINUED | OUTPATIENT
Start: 2018-05-28 | End: 2018-05-30

## 2018-05-28 RX ORDER — POTASSIUM CHLORIDE 20 MEQ/15ML
40 SOLUTION ORAL
Status: DISCONTINUED | OUTPATIENT
Start: 2018-05-28 | End: 2018-05-31 | Stop reason: HOSPADM

## 2018-05-28 RX ORDER — MAGNESIUM SULFATE HEPTAHYDRATE 40 MG/ML
2 INJECTION, SOLUTION INTRAVENOUS
Status: DISCONTINUED | OUTPATIENT
Start: 2018-05-28 | End: 2018-05-31 | Stop reason: HOSPADM

## 2018-05-28 RX ORDER — CEFEPIME HYDROCHLORIDE 2 G/1
2 INJECTION, POWDER, FOR SOLUTION INTRAVENOUS
Status: DISCONTINUED | OUTPATIENT
Start: 2018-05-28 | End: 2018-05-31 | Stop reason: HOSPADM

## 2018-05-28 RX ORDER — LORAZEPAM 1 MG/1
2 TABLET ORAL EVERY 6 HOURS PRN
Status: DISCONTINUED | OUTPATIENT
Start: 2018-05-28 | End: 2018-05-31 | Stop reason: HOSPADM

## 2018-05-28 RX ORDER — POTASSIUM CHLORIDE 20 MEQ/15ML
60 SOLUTION ORAL
Status: DISCONTINUED | OUTPATIENT
Start: 2018-05-28 | End: 2018-05-31 | Stop reason: HOSPADM

## 2018-05-28 RX ADMIN — INSULIN ASPART 5 UNITS: 100 INJECTION, SOLUTION INTRAVENOUS; SUBCUTANEOUS at 11:05

## 2018-05-28 RX ADMIN — DIPHENHYDRAMINE HYDROCHLORIDE 50 MG: 50 INJECTION, SOLUTION INTRAMUSCULAR; INTRAVENOUS at 11:05

## 2018-05-28 RX ADMIN — TOPIRAMATE 25 MG: 25 TABLET, FILM COATED ORAL at 09:05

## 2018-05-28 RX ADMIN — MORPHINE SULFATE 15 MG: 15 TABLET, EXTENDED RELEASE ORAL at 09:05

## 2018-05-28 RX ADMIN — CEFEPIME 2 G: 2 INJECTION, POWDER, FOR SOLUTION INTRAVENOUS at 04:05

## 2018-05-28 RX ADMIN — Medication 400 MG: at 09:05

## 2018-05-28 RX ADMIN — TACROLIMUS 3 MG: 1 CAPSULE ORAL at 05:05

## 2018-05-28 RX ADMIN — PANCRELIPASE 6 CAPSULE: 24000; 76000; 120000 CAPSULE, DELAYED RELEASE PELLETS ORAL at 05:05

## 2018-05-28 RX ADMIN — AMLODIPINE BESYLATE 10 MG: 10 TABLET ORAL at 09:05

## 2018-05-28 RX ADMIN — LORAZEPAM 1.5 MG: 0.5 TABLET ORAL at 12:05

## 2018-05-28 RX ADMIN — GABAPENTIN 1100 MG: 400 CAPSULE ORAL at 08:05

## 2018-05-28 RX ADMIN — Medication 400 MG: at 08:05

## 2018-05-28 RX ADMIN — OXYCODONE HYDROCHLORIDE 10 MG: 5 TABLET ORAL at 12:05

## 2018-05-28 RX ADMIN — LORAZEPAM 1.5 MG: 0.5 TABLET ORAL at 06:05

## 2018-05-28 RX ADMIN — FLUTICASONE PROPIONATE AND SALMETEROL 1 PUFF: 50; 250 POWDER RESPIRATORY (INHALATION) at 08:05

## 2018-05-28 RX ADMIN — CEFEPIME 2 G: 2 INJECTION, POWDER, FOR SOLUTION INTRAVENOUS at 09:05

## 2018-05-28 RX ADMIN — OXYCODONE HYDROCHLORIDE 10 MG: 5 TABLET ORAL at 08:05

## 2018-05-28 RX ADMIN — ARIPIPRAZOLE 2 MG: 2 TABLET ORAL at 08:05

## 2018-05-28 RX ADMIN — MIRTAZAPINE 30 MG: 15 TABLET, FILM COATED ORAL at 08:05

## 2018-05-28 RX ADMIN — MORPHINE SULFATE 15 MG: 15 TABLET, EXTENDED RELEASE ORAL at 03:05

## 2018-05-28 RX ADMIN — CEFEPIME 2 G: 2 INJECTION, POWDER, FOR SOLUTION INTRAVENOUS at 01:05

## 2018-05-28 RX ADMIN — Medication 1 TABLET: at 09:05

## 2018-05-28 RX ADMIN — TIZANIDINE 4 MG: 2 TABLET ORAL at 02:05

## 2018-05-28 RX ADMIN — MYCOPHENOLATE MOFETIL 500 MG: 250 CAPSULE ORAL at 08:05

## 2018-05-28 RX ADMIN — TIZANIDINE 6 MG: 2 TABLET ORAL at 10:05

## 2018-05-28 RX ADMIN — DIPHENHYDRAMINE HYDROCHLORIDE 50 MG: 50 INJECTION, SOLUTION INTRAMUSCULAR; INTRAVENOUS at 05:05

## 2018-05-28 RX ADMIN — LORAZEPAM 2 MG: 1 TABLET ORAL at 06:05

## 2018-05-28 RX ADMIN — CETIRIZINE HYDROCHLORIDE 5 MG: 5 TABLET, FILM COATED ORAL at 09:05

## 2018-05-28 RX ADMIN — PANCRELIPASE 6 CAPSULE: 24000; 76000; 120000 CAPSULE, DELAYED RELEASE PELLETS ORAL at 09:05

## 2018-05-28 RX ADMIN — METHYLPREDNISOLONE SODIUM SUCCINATE: 1 INJECTION, POWDER, LYOPHILIZED, FOR SOLUTION INTRAMUSCULAR; INTRAVENOUS at 09:05

## 2018-05-28 RX ADMIN — MONTELUKAST SODIUM 10 MG: 10 TABLET, FILM COATED ORAL at 09:05

## 2018-05-28 RX ADMIN — INSULIN ASPART 10 UNITS: 100 INJECTION, SOLUTION INTRAVENOUS; SUBCUTANEOUS at 08:05

## 2018-05-28 RX ADMIN — INSULIN ASPART 5 UNITS: 100 INJECTION, SOLUTION INTRAVENOUS; SUBCUTANEOUS at 02:05

## 2018-05-28 RX ADMIN — PANCRELIPASE 4 CAPSULE: 24000; 76000; 120000 CAPSULE, DELAYED RELEASE PELLETS ORAL at 01:05

## 2018-05-28 RX ADMIN — TACROLIMUS 0.5 MG: 0.5 CAPSULE ORAL at 05:05

## 2018-05-28 RX ADMIN — FLUTICASONE PROPIONATE 100 MCG: 50 SPRAY, METERED NASAL at 09:05

## 2018-05-28 RX ADMIN — SODIUM CHLORIDE, SODIUM LACTATE, POTASSIUM CHLORIDE, AND CALCIUM CHLORIDE 1000 ML: .6; .31; .03; .02 INJECTION, SOLUTION INTRAVENOUS at 09:05

## 2018-05-28 RX ADMIN — OXYCODONE HYDROCHLORIDE 10 MG: 5 TABLET ORAL at 11:05

## 2018-05-28 RX ADMIN — INSULIN ASPART 5 UNITS: 100 INJECTION, SOLUTION INTRAVENOUS; SUBCUTANEOUS at 09:05

## 2018-05-28 RX ADMIN — TACROLIMUS 0.5 MG: 0.5 CAPSULE ORAL at 09:05

## 2018-05-28 RX ADMIN — POLYETHYLENE GLYCOL 3350 17 G: 17 POWDER, FOR SOLUTION ORAL at 08:05

## 2018-05-28 RX ADMIN — GABAPENTIN 1100 MG: 400 CAPSULE ORAL at 03:05

## 2018-05-28 RX ADMIN — GABAPENTIN 1100 MG: 400 CAPSULE ORAL at 09:05

## 2018-05-28 RX ADMIN — PANCRELIPASE 4 CAPSULE: 24000; 76000; 120000 CAPSULE, DELAYED RELEASE PELLETS ORAL at 08:05

## 2018-05-28 RX ADMIN — OYSTER SHELL CALCIUM WITH VITAMIN D 1 TABLET: 500; 200 TABLET, FILM COATED ORAL at 05:05

## 2018-05-28 RX ADMIN — INSULIN ASPART 10 UNITS: 100 INJECTION, SOLUTION INTRAVENOUS; SUBCUTANEOUS at 02:05

## 2018-05-28 RX ADMIN — DAPSONE 100 MG: 100 TABLET ORAL at 09:05

## 2018-05-28 RX ADMIN — OXYCODONE HYDROCHLORIDE 10 MG: 5 TABLET ORAL at 06:05

## 2018-05-28 RX ADMIN — FLUTICASONE PROPIONATE AND SALMETEROL 1 PUFF: 50; 250 POWDER RESPIRATORY (INHALATION) at 09:05

## 2018-05-28 RX ADMIN — OXYCODONE HYDROCHLORIDE 10 MG: 5 TABLET ORAL at 03:05

## 2018-05-28 RX ADMIN — LISINOPRIL 10 MG: 2.5 TABLET ORAL at 09:05

## 2018-05-28 RX ADMIN — LORAZEPAM 2 MG: 1 TABLET ORAL at 01:05

## 2018-05-28 RX ADMIN — OYSTER SHELL CALCIUM WITH VITAMIN D 1 TABLET: 500; 200 TABLET, FILM COATED ORAL at 09:05

## 2018-05-28 RX ADMIN — DIPHENHYDRAMINE HYDROCHLORIDE 50 MG: 50 INJECTION, SOLUTION INTRAMUSCULAR; INTRAVENOUS at 12:05

## 2018-05-28 RX ADMIN — DIPHENHYDRAMINE HYDROCHLORIDE 50 MG: 50 INJECTION, SOLUTION INTRAMUSCULAR; INTRAVENOUS at 06:05

## 2018-05-28 RX ADMIN — TOPIRAMATE 25 MG: 25 TABLET, FILM COATED ORAL at 08:05

## 2018-05-28 RX ADMIN — VANCOMYCIN HYDROCHLORIDE 750 MG: 10 INJECTION, POWDER, LYOPHILIZED, FOR SOLUTION INTRAVENOUS at 09:05

## 2018-05-28 RX ADMIN — RIBAVIRIN 400 MG: 200 CAPSULE ORAL at 10:05

## 2018-05-28 RX ADMIN — FOLIC ACID 1000 MCG: 1 TABLET ORAL at 09:05

## 2018-05-28 RX ADMIN — RIBAVIRIN 400 MG: 200 CAPSULE ORAL at 03:05

## 2018-05-28 RX ADMIN — TACROLIMUS 3 MG: 1 CAPSULE ORAL at 09:05

## 2018-05-28 RX ADMIN — MORPHINE SULFATE 15 MG: 15 TABLET, EXTENDED RELEASE ORAL at 08:05

## 2018-05-28 RX ADMIN — MYCOPHENOLATE MOFETIL 500 MG: 250 CAPSULE ORAL at 09:05

## 2018-05-28 RX ADMIN — RIBAVIRIN 400 MG: 200 CAPSULE ORAL at 09:05

## 2018-05-28 RX ADMIN — PANCRELIPASE 2 CAPSULE: 24000; 76000; 120000 CAPSULE, DELAYED RELEASE PELLETS ORAL at 10:05

## 2018-05-28 NOTE — PLAN OF CARE
Problem: Patient Care Overview  Goal: Plan of Care Review  Outcome: Ongoing (interventions implemented as appropriate)  Pt AAOx4 throughout shift. Vital signs stable throughout shift. Pt denies any complaints.  NADN.  Lunch time blood sugar 470.  Insulin given per protocol.  Pt educated on eating and food choices.  Pt voiced understanding.  Pt call light in reach and instructed to call with all needs. Will continue to monitor.

## 2018-05-28 NOTE — NURSING
CBG remains >500 notified Dr Paredes. Order to give 12 units -- 5 for meal and 7 for coverage. Pt eating candy and drinking soda. Pt snacks all day and night. Will retest CBG @ HS. Will continue to monitor.

## 2018-05-28 NOTE — PROGRESS NOTES
Admit Note     Met with patient to assess needs. Patient is a 28 y.o. single female, admitted for a sinus infection. Pt to get a bronch later in the week. Pt is s/p lung transplant from 6/12/2014.      Patient admitted from home on 5/25/2018 .  At this time, patient presents as alert and oriented x 4, pleasant, good eye contact, well groomed, recall good, concentration/judgement good, average intelligence, calm, communicative, cooperative and asking and answering questions appropriately. Pt did appear eager to get pain medications and making demands of RN while SW present. At this time, patients caregiver not at the bedside.     Household/Family Systems     Patient resides with patient's significant other, at [address] [place] [address].   much of the time as well. Support system includes pt's mother Kim Ibarra, aunt Mariel Calero, and s/o Shawn Castrejon.  Patient does not have dependents that are need of being cared for.     Patients primary caregivers are Kim Ibarra, patients mother, phone number [phone] and Shawn Castrejon, patient's boyfriend, phone number [phone].  Confirmed patients contact information is [phone] (home);     Telephone Information:   Mobile [phone]     During admission, patient's caregiver plans to stay at home.  Confirmed patient and patients caregivers do have access to reliable transportation.    Cognitive Status/Learning     Patient reports reading ability as 8th grade and states patient does have difficulty with short-term memory.  Patient reports patient learns best by visual/auditory/hands on information.   Needed: No.   Highest education level: Grade School (0-8)    Vocation/Disability     Working for Income: No  If no, reason not working: Disability  Patient is disabled due to Cystic Fibrosis since 2004. Pt was taking classes part-time at [employer] in [place] but dropped out due to illness. Pt reports that she does not  plan on returning.     Adherence     Patient reports a high level of adherence to patients health care regimen.  Although there continues to be a concern from team that pt has a pain medication dependence. SW concerned that pt could be overusing.  Adherence counseling and education provided. Patient verbalizes understanding.    Substance Use    Patient reports the following substance usage.    Tobacco: none, patient denies any use.   Alcohol: none, patient denies any use.  Illicit Drugs/Non-prescribed Medications: none, patient denies any use. Please note that team concerned for some dependence on pain medications/anxiety medication. White in room pt asked for increased Ativan dose.   Patient states clear understanding of the potential impact of substance use.  Substance abstinence/cessation counseling, education and resources provided and reviewed.     Services Utilizing/ADLS    Infusion Service: Prior to admission, patient utilizing? no; Pt has a port but receives her IV meds as an inpatient due to her report of bad side effects  Home Health: Prior to admission, patient utilizing? no  DME: Prior to admission, no  Pulmonary/Cardiac Rehab: Prior to admission, no  Dialysis:  Prior to admission, no  Transplant Specialty Pharmacy:  Prior to admission, yes; Pt uses [facility] (mail-delivery) for transplant medications and [facility] at [address] [place] [address] for non-transplant medications.    Prior to admission, patient reports patient was independent with ADLS and was driving.  Patient reports patient is not able to care for self at this time due to compromised medical condition (as documented in medical record) and physical weakness..  Patient indicates a willingness to care for self once medically cleared to do so.    Insurance/Medications    Insured by   Payor/Plan Subscr  Sex Relation Sub. Ins. ID Effective Group Num   1. MEDICAID - * ELOISE WILLIAM* 1990 Female  [id number] 2/1/15 [facility]  "                                  [address] [address]      Primary Insurance (for UNOS reporting): Public Insurance - Medicaid-C  Secondary Insurance (for UNOS reporting): None    Patient reports patient is able to obtain and afford medications at this time and at time of discharge.      Living Will/Healthcare Power of     Patient states patient has a LW and/or HCPA on file. Pt provided new HCPA listing her s/o Shawn Castrejon ([phone]).      Coping/Mental Health    Patient is coping adequately with the aid of  s/o and mother. Pt was reporting high anxiety while SW in room due to "steroids". Pt requested and increase in her Ativan while Dr. Morin was present in room . Pt is on Abilify, Ativan, and Remeron .  Patient indicates mental health difficulties that are currently being managed with medication. Pt's medications managed by PCP. Team continues to monitor.    Discharge Planning    At time of discharge, patient plans to return to patient's home under the care of pt's mother or s/o.  Patients significant other and mother will transport patient.  Per rounds today, expected discharge date has not been medically determined at this time. Patient and patients caregiver  verbalize understanding and are involved in treatment planning and discharge process.    Additional Concerns    Patient is being followed for needs, education, resources, information, emotional support, supportive counseling, and for supportive and skilled discharge plan of care.  providing ongoing psychosocial support, education, resources and d/c planning as needed.  SW remains available.  provided resource list, patient choice, psychosocial and supportive counseling, resources, education, assistance and discharge planning with patient and caregiver involvement, ongoing SW availability and services as appropriate.  "

## 2018-05-28 NOTE — ASSESSMENT & PLAN NOTE
Current outpatient regimen: tacrolimus 3.5 mg BID, prednisone 5 mg.  Not on MMF due to leukopenia in the past.    Will continue with current immunosuppression. Monitor daily tacrolimus levels and dose adjust accordingly. Tac level 10 today.

## 2018-05-28 NOTE — ASSESSMENT & PLAN NOTE
History suggestive of steroid induced hyperglycemia and/or CFRDM  BG goal 140-180    Increase novolog 10 units with meals.   Continue bg monitoring ac/hs and moderate dose correction scale.     Patient is snacking; discussed limiting drinks/food high in sugar or eating with meals rather than in between. Patient aware that these foods especially will increase her bg while on steroids.     Check labs for c peptide, glucose, A1c.

## 2018-05-28 NOTE — PROGRESS NOTES
Notified Dr. Baron that pt's blood glucose was 471 and 500 on recheck. Will give 10 units of novolog now and recheck blood glucose before pt eats. Pt will get 5 units with meals. Night RN will check blood glucose at 2 am. Notified Helga DAVIS of the changes to insulin orders.

## 2018-05-28 NOTE — ASSESSMENT & PLAN NOTE
- Culture taken  - History of pseudomonal sinus infections  - Do not believe patient needs revision sinus surgery at this point  - Agree with current abx regimen  - Discussed with staff Dr. Null

## 2018-05-28 NOTE — SUBJECTIVE & OBJECTIVE
Medications:  Continuous Infusions:  Scheduled Meds:   amLODIPine  10 mg Oral Daily    ARIPiprazole  2 mg Oral QHS    calcium-vitamin D3  1 tablet Oral BID WM    ceFEPime (MAXIPIME) IVPB  2 g Intravenous Q12H    cetirizine  5 mg Oral Daily    dapsone  100 mg Oral Daily    fluticasone  2 spray Each Nare Daily    fluticasone-salmeterol 250-50 mcg/dose  1 puff Inhalation BID    folic acid  1,000 mcg Oral Daily    gabapentin  1,100 mg Oral TID    insulin aspart U-100  5 Units Subcutaneous TIDWM    lipase-protease-amylase 24,000-76,000-120,000 units  6 capsule Oral TIDWM    lisinopril  10 mg Oral Daily    magnesium oxide  400 mg Oral BID    methylPREDNISolone (SOLU-Medrol) IVPB (doses > 250 mg)   Intravenous Daily    mirtazapine  30 mg Oral QHS    montelukast  10 mg Oral Daily    morphine  15 mg Oral TID    multivit-min-FA-coenzyme Q10 100-5 mcg-mg  1 tablet Oral BID    mycophenolate  500 mg Oral BID    polyethylene glycol  17 g Oral BID    ribavirin  400 mg Oral TID    tacrolimus  0.5 mg Oral BID    tacrolimus  3 mg Oral BID    topiramate  25 mg Oral BID    vancomycin (VANCOCIN) IVPB  15 mg/kg Intravenous Q24H     PRN Meds:acetaminophen, albuterol, butalbital-acetaminophen-caffeine -40 mg, butalbital-acetaminophen-caffeine -40 mg, dextrose 50%, dextrose 50%, diphenhydrAMINE, glucagon (human recombinant), glucose, glucose, heparin, porcine (PF), insulin aspart U-100, lidocaine-prilocaine, lipase-protease-amylase 24,000-76,000-120,000 units, LORazepam, ondansetron, oxyCODONE, promethazine, tiZANidine     No current facility-administered medications on file prior to encounter.      Current Outpatient Prescriptions on File Prior to Encounter   Medication Sig    amlodipine (NORVASC) 5 MG tablet Take 10 mg by mouth once daily.     aripiprazole (ABILIFY) 2 MG Tab Take 2 mg by mouth once daily.    butalbital-acetaminophen-caffeine -40 mg (FIORICET, ESGIC) -40 mg per tablet  TAKE 1 TABLET BY MOUTH EVERY 6 HOURS AS NEEDED FOR HEADACHE    calcium-vitamin D3 500 mg(1,250mg) -200 unit per tablet Take 1 tablet by mouth 2 (two) times daily with meals.    CREON 24,000-76,000 -120,000 unit capsule TAKE 5 CAPSULES BY MOUTH WITH MEALS AND 4 CAPSULES WITH SNACKS EVERYDAY    dapsone 100 MG Tab TAKE 1 TABLET BY MOUTH EVERY DAY    diphenhydrAMINE (BENADRYL) 50 MG tablet Take 1 tablet (50 mg total) by mouth every 6 (six) hours as needed for Itching.    DULERA 100-5 mcg/actuation HFAA INHALE 1 PUFF BY MOUTH TWICE DAILY    fexofenadine (ALLEGRA) 180 MG tablet Take 180 mg by mouth once daily.    fluconazole (DIFLUCAN) 150 MG Tab TAKE 1 TABLET BY MOUTH EVERY WEEK (Patient taking differently: TAKE 1 TABLET BY MOUTH EVERY WEEK PRN VAGINAL YEAST S/S)    fluticasone (FLONASE) 50 mcg/actuation nasal spray INSERT 2 SPRAYS IN EACH NOSTRIL DAILY    folic acid (FOLVITE) 1 MG tablet Take 1 tablet (1,000 mcg total) by mouth once daily.    gabapentin (NEURONTIN) 300 MG capsule Take 1 capsule (300 mg total) by mouth 3 (three) times daily. (Patient taking differently: Take 1,100 mg by mouth 3 (three) times daily. )    insulin glargine, TOUJEO, (TOUJEO) 300 unit/mL (1.5 mL) InPn pen Inject 3 Units into the skin once daily.     levalbuterol (XOPENEX HFA) 45 mcg/actuation inhaler INHALE 1 TO 2 PUFFS INTO THE LUNGS EVERY 6 HOURS AS NEEDED FOR WHEEZING OR SHORTNESS OF BREATH. USE WITH SPACER.    lisinopril 10 MG tablet Take 10 mg by mouth once daily.    LORazepam (ATIVAN) 1 MG tablet Take 1 mg by mouth every 6 (six) hours as needed for Anxiety.    magnesium oxide (MAG-OX) 400 mg tablet Take 1 tablet (400 mg total) by mouth 2 (two) times daily.    mirtazapine (REMERON) 30 MG tablet Take 30 mg by mouth every evening.    montelukast (SINGULAIR) 10 mg tablet TAKE 1 TABLET BY MOUTH EVERY DAY    morphine (MS CONTIN) 15 MG 12 hr tablet Take 15 mg by mouth 3 (three) times daily.    mv. min cmb#[id number]  (AQUADEKS) 100-700-10 mcg-mcg-mg Cap cap Take 1 capsule by mouth 2 (two) times daily.    mycophenolate (CELLCEPT) 250 mg Cap Take 2 capsules (500 mg total) by mouth 2 (two) times daily.    omeprazole (PRILOSEC) 40 MG capsule TAKE 1 CAPSULE BY MOUTH EVERY MORNING    ondansetron (ZOFRAN) 8 MG tablet TAKE 1 TABLET(8 MG) BY MOUTH EVERY 8 HOURS AS NEEDED    oxycodone (ROXICODONE) 5 MG immediate release tablet Take 10 mg by mouth every 4 (four) hours as needed for Pain.    polyethylene glycol (GLYCOLAX) 17 gram PwPk Take 17 g by mouth 2 (two) times daily.    predniSONE (DELTASONE) 5 MG tablet Take 5 mg by mouth once daily.    PROCHAMBER USE AS DIRECTED    promethazine (PHENERGAN) 25 MG tablet TAKE 1 TABLET BY MOUTH EVERY 8 HOURS AS NEEDED FOR NAUSEA    tacrolimus (PROGRAF) 0.5 MG Cap Take 1 capsule (0.5 mg total) by mouth every 12 (twelve) hours.    tacrolimus (PROGRAF) 1 MG Cap Take 3 capsules (3 mg total) by mouth every 12 (twelve) hours. Daily doses: 3.5 mg every 12 hours.    tiZANidine (ZANAFLEX) 4 MG tablet TAKE 2 TABLETS BY MOUTH EVERY 6 HOURS AS NEEDED       Review of patient's allergies indicates:   Allergen Reactions    Albuterol Palpitations    Colistin Anaphylaxis    Vancomycin analogues      Infusion reaction that does not resolve with slowing    Neupogen [filgrastim] Other (See Comments)     Ostealgia after five daily doses of 300 mcg.      Bactrim [sulfamethoxazole-trimethoprim] Hives    Ceftazidime Hives     Pt stated can tolerate cefapine not ceftazidime    Ceftazidime     Dronabinol Other (See Comments)     Mental changes/hallucinations    Haldol [haloperidol lactate] Other (See Comments)     Seizure like activity    Nsaids (non-steroidal anti-inflammatory drug)      Cannot have due to lung transplant    Adhesive Rash     Cloth tape- please use tegaderm or paper tape    Aztreonam Rash    Ciprofloxacin Nausea And Vomiting     Projectile N/V, per patient.  Unwilling to retry therapy.        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     Past Surgical History:   Procedure Laterality Date    ABDOMINAL SURGERY      peg tube     CHOLECYSTECTOMY  2016    ENDOMETRIAL ABLATION  2015    [facility]    FESS      LARYNX SURGERY  2016    LUNG TRANSPLANT Bilateral 6/12/14    MYRINGOTOMY W/ TUBES Right 04/2017    PORTACATH PLACEMENT Right     rt sc    SALPINGECTOMY Bilateral 2015    [facility]     Family History     Problem Relation (Age of Onset)    Drug abuse Maternal Grandfather    Thrombocytopenia Maternal Grandfather        Social History Main Topics    Smoking status: Never Smoker    Smokeless tobacco: Never Used    Alcohol use No      Comment: Has not had an alcoholic drink in more than 2 months.    Drug use: No    Sexual activity: Yes     Partners: Male     Birth control/ protection: Implant     Review of Systems  Objective:     Vital Signs (Most Recent):  Temp: 98.9 °F (37.2 °C) (05/27/18 1556)  Pulse: 96 (05/27/18 1556)  Resp: 16 (05/27/18 1556)  BP: 126/67 (05/27/18 1556)  SpO2: (!) 93 % (05/27/18 1556) Vital Signs (24h Range):  Temp:  [97.8 °F (36.6 °C)-98.9 °F (37.2 °C)] 98.9 °F (37.2 °C)  Pulse:  [] 96  Resp:  [13-19] 16  SpO2:  [90 %-95 %] 93 %  BP: (106-126)/(65-72) 126/67     Weight: 54 kg (119 lb)  Body mass index is 23.24 kg/m².       Physical Exam   Awake, alert, oriented  EOMI, PERRL  Normocephalic  External ears normal  EAC normal, mucoid effusion bilaterally, no evidence of acute otitis media  External nose normal  Thick discharge in middle meatus bilaterally, maxillary antrostomies  patent  Oral cavity/oropharynx without lesions  Neck supple      Significant Labs:  BMP:   Recent Labs  Lab 05/27/18  0530         CO2 28   BUN 16   CREATININE 1.0   CALCIUM 9.1   MG 1.7     CBC:   Recent Labs  Lab 05/27/18  0530   WBC 5.96   RBC 3.09*   HGB 8.2*   HCT 28.4*   *   MCV 92   MCH 26.5*   MCHC 28.9*       Significant Diagnostics:  None

## 2018-05-28 NOTE — CONSULTS
[facility]  Otorhinolaryngology-Head & Neck Surgery  Consult Note    Patient Name: Juanita Ibarra  MRN: [id number]  Code Status: Full Code  Admission Date: 5/25/2018  Hospital Length of Stay: 2 days  Attending Physician: Jake Alvarez MD  Primary Care Provider: Jake Alvarez MD    Patient information was obtained from patient and past medical records.     Inpatient consult to ENT  Consult performed by: DEBRA TAPIA  Consult ordered by: SUSAN PITTMAN        Subjective:     Chief Complaint/Reason for Admission: shortness of breath    History of Present Illness: 28 year old female with history of CF, lung transplantation, vocal fold immobility s/p thyroplasty, chronic sinusitis s/p endoscopic sinus surgery presents to ED with nasal congestion and worsening respiratory status.  ENT is consulted for sinus congestion and drainage.    Juanita reports bright green nasal drainage after doing a sinus rinse yesterday.  She currently denies facial pain or pressure but does have worsening nasal congestion over the last 1 week.  She also reports an episode of intense left ear pain after doing her sinus rinse. She reports persistent long-standing post-nasal drip. She has been using her steroid spray and sinus rinses as prescribed.      Medications:  Continuous Infusions:  Scheduled Meds:   amLODIPine  10 mg Oral Daily    ARIPiprazole  2 mg Oral QHS    calcium-vitamin D3  1 tablet Oral BID WM    ceFEPime (MAXIPIME) IVPB  2 g Intravenous Q12H    cetirizine  5 mg Oral Daily    dapsone  100 mg Oral Daily    fluticasone  2 spray Each Nare Daily    fluticasone-salmeterol 250-50 mcg/dose  1 puff Inhalation BID    folic acid  1,000 mcg Oral Daily    gabapentin  1,100 mg Oral TID    insulin aspart U-100  5 Units Subcutaneous TIDWM    lipase-protease-amylase 24,000-76,000-120,000 units  6 capsule Oral TIDWM    lisinopril  10 mg Oral Daily    magnesium oxide  400 mg Oral BID     methylPREDNISolone (SOLU-Medrol) IVPB (doses > 250 mg)   Intravenous Daily    mirtazapine  30 mg Oral QHS    montelukast  10 mg Oral Daily    morphine  15 mg Oral TID    multivit-min-FA-coenzyme Q10 100-5 mcg-mg  1 tablet Oral BID    mycophenolate  500 mg Oral BID    polyethylene glycol  17 g Oral BID    ribavirin  400 mg Oral TID    tacrolimus  0.5 mg Oral BID    tacrolimus  3 mg Oral BID    topiramate  25 mg Oral BID    vancomycin (VANCOCIN) IVPB  15 mg/kg Intravenous Q24H     PRN Meds:acetaminophen, albuterol, butalbital-acetaminophen-caffeine -40 mg, butalbital-acetaminophen-caffeine -40 mg, dextrose 50%, dextrose 50%, diphenhydrAMINE, glucagon (human recombinant), glucose, glucose, heparin, porcine (PF), insulin aspart U-100, lidocaine-prilocaine, lipase-protease-amylase 24,000-76,000-120,000 units, LORazepam, ondansetron, oxyCODONE, promethazine, tiZANidine     No current facility-administered medications on file prior to encounter.      Current Outpatient Prescriptions on File Prior to Encounter   Medication Sig    amlodipine (NORVASC) 5 MG tablet Take 10 mg by mouth once daily.     aripiprazole (ABILIFY) 2 MG Tab Take 2 mg by mouth once daily.    butalbital-acetaminophen-caffeine -40 mg (FIORICET, ESGIC) -40 mg per tablet TAKE 1 TABLET BY MOUTH EVERY 6 HOURS AS NEEDED FOR HEADACHE    calcium-vitamin D3 500 mg(1,250mg) -200 unit per tablet Take 1 tablet by mouth 2 (two) times daily with meals.    CREON 24,000-76,000 -120,000 unit capsule TAKE 5 CAPSULES BY MOUTH WITH MEALS AND 4 CAPSULES WITH SNACKS EVERYDAY    dapsone 100 MG Tab TAKE 1 TABLET BY MOUTH EVERY DAY    diphenhydrAMINE (BENADRYL) 50 MG tablet Take 1 tablet (50 mg total) by mouth every 6 (six) hours as needed for Itching.    DULERA 100-5 mcg/actuation HFAA INHALE 1 PUFF BY MOUTH TWICE DAILY    fexofenadine (ALLEGRA) 180 MG tablet Take 180 mg by mouth once daily.    fluconazole (DIFLUCAN) 150 MG Tab TAKE  1 TABLET BY MOUTH EVERY WEEK (Patient taking differently: TAKE 1 TABLET BY MOUTH EVERY WEEK PRN VAGINAL YEAST S/S)    fluticasone (FLONASE) 50 mcg/actuation nasal spray INSERT 2 SPRAYS IN EACH NOSTRIL DAILY    folic acid (FOLVITE) 1 MG tablet Take 1 tablet (1,000 mcg total) by mouth once daily.    gabapentin (NEURONTIN) 300 MG capsule Take 1 capsule (300 mg total) by mouth 3 (three) times daily. (Patient taking differently: Take 1,100 mg by mouth 3 (three) times daily. )    insulin glargine, TOUJEO, (TOUJEO) 300 unit/mL (1.5 mL) InPn pen Inject 3 Units into the skin once daily.     levalbuterol (XOPENEX HFA) 45 mcg/actuation inhaler INHALE 1 TO 2 PUFFS INTO THE LUNGS EVERY 6 HOURS AS NEEDED FOR WHEEZING OR SHORTNESS OF BREATH. USE WITH SPACER.    lisinopril 10 MG tablet Take 10 mg by mouth once daily.    LORazepam (ATIVAN) 1 MG tablet Take 1 mg by mouth every 6 (six) hours as needed for Anxiety.    magnesium oxide (MAG-OX) 400 mg tablet Take 1 tablet (400 mg total) by mouth 2 (two) times daily.    mirtazapine (REMERON) 30 MG tablet Take 30 mg by mouth every evening.    montelukast (SINGULAIR) 10 mg tablet TAKE 1 TABLET BY MOUTH EVERY DAY    morphine (MS CONTIN) 15 MG 12 hr tablet Take 15 mg by mouth 3 (three) times daily.    mv. min cmb#[id number] (AQUADEKS) 100-700-10 mcg-mcg-mg Cap cap Take 1 capsule by mouth 2 (two) times daily.    mycophenolate (CELLCEPT) 250 mg Cap Take 2 capsules (500 mg total) by mouth 2 (two) times daily.    omeprazole (PRILOSEC) 40 MG capsule TAKE 1 CAPSULE BY MOUTH EVERY MORNING    ondansetron (ZOFRAN) 8 MG tablet TAKE 1 TABLET(8 MG) BY MOUTH EVERY 8 HOURS AS NEEDED    oxycodone (ROXICODONE) 5 MG immediate release tablet Take 10 mg by mouth every 4 (four) hours as needed for Pain.    polyethylene glycol (GLYCOLAX) 17 gram PwPk Take 17 g by mouth 2 (two) times daily.    predniSONE (DELTASONE) 5 MG tablet Take 5 mg by mouth once daily.    PROCHAMBER USE AS DIRECTED     promethazine (PHENERGAN) 25 MG tablet TAKE 1 TABLET BY MOUTH EVERY 8 HOURS AS NEEDED FOR NAUSEA    tacrolimus (PROGRAF) 0.5 MG Cap Take 1 capsule (0.5 mg total) by mouth every 12 (twelve) hours.    tacrolimus (PROGRAF) 1 MG Cap Take 3 capsules (3 mg total) by mouth every 12 (twelve) hours. Daily doses: 3.5 mg every 12 hours.    tiZANidine (ZANAFLEX) 4 MG tablet TAKE 2 TABLETS BY MOUTH EVERY 6 HOURS AS NEEDED       Review of patient's allergies indicates:   Allergen Reactions    Albuterol Palpitations    Colistin Anaphylaxis    Vancomycin analogues      Infusion reaction that does not resolve with slowing    Neupogen [filgrastim] Other (See Comments)     Ostealgia after five daily doses of 300 mcg.      Bactrim [sulfamethoxazole-trimethoprim] Hives    Ceftazidime Hives     Pt stated can tolerate cefapine not ceftazidime    Ceftazidime     Dronabinol Other (See Comments)     Mental changes/hallucinations    Haldol [haloperidol lactate] Other (See Comments)     Seizure like activity    Nsaids (non-steroidal anti-inflammatory drug)      Cannot have due to lung transplant    Adhesive Rash     Cloth tape- please use tegaderm or paper tape    Aztreonam Rash    Ciprofloxacin Nausea And Vomiting     Projectile N/V, per patient.  Unwilling to retry therapy.       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     Past Surgical History:   Procedure Laterality Date    ABDOMINAL SURGERY      peg tube     CHOLECYSTECTOMY  2016    ENDOMETRIAL ABLATION  2015    [facility]    FESS      LARYNX SURGERY  2016    LUNG TRANSPLANT  Bilateral 6/12/14    MYRINGOTOMY W/ TUBES Right 04/2017    PORTACATH PLACEMENT Right     rt sc    SALPINGECTOMY Bilateral 2015    [facility]     Family History     Problem Relation (Age of Onset)    Drug abuse Maternal Grandfather    Thrombocytopenia Maternal Grandfather        Social History Main Topics    Smoking status: Never Smoker    Smokeless tobacco: Never Used    Alcohol use No      Comment: Has not had an alcoholic drink in more than 2 months.    Drug use: No    Sexual activity: Yes     Partners: Male     Birth control/ protection: Implant     Review of Systems  Objective:     Vital Signs (Most Recent):  Temp: 98.9 °F (37.2 °C) (05/27/18 1556)  Pulse: 96 (05/27/18 1556)  Resp: 16 (05/27/18 1556)  BP: 126/67 (05/27/18 1556)  SpO2: (!) 93 % (05/27/18 1556) Vital Signs (24h Range):  Temp:  [97.8 °F (36.6 °C)-98.9 °F (37.2 °C)] 98.9 °F (37.2 °C)  Pulse:  [] 96  Resp:  [13-19] 16  SpO2:  [90 %-95 %] 93 %  BP: (106-126)/(65-72) 126/67     Weight: 54 kg (119 lb)  Body mass index is 23.24 kg/m².       Physical Exam   Awake, alert, oriented  EOMI, PERRL  Normocephalic  External ears normal  EAC normal, mucoid effusion bilaterally, no evidence of acute otitis media  External nose normal  Thick discharge in middle meatus bilaterally, maxillary antrostomies patent  Oral cavity/oropharynx without lesions  Neck supple      Significant Labs:  BMP:   Recent Labs  Lab 05/27/18  0530         CO2 28   BUN 16   CREATININE 1.0   CALCIUM 9.1   MG 1.7     CBC:   Recent Labs  Lab 05/27/18  0530   WBC 5.96   RBC 3.09*   HGB 8.2*   HCT 28.4*   *   MCV 92   MCH 26.5*   MCHC 28.9*       Significant Diagnostics:  None    Assessment/Plan:     Acute mucoid otitis media of right ear    - Bilateral mucoid effusions, no evidence of ear infection  - Will discuss PE tube placement with our otologist Dr. Zelaya        Sinusitis, chronic    - Culture taken  - History of pseudomonal sinus infections  - Do not believe  patient needs revision sinus surgery at this point  - Agree with current abx regimen  - Discussed with staff Dr. Null          VTE Risk Mitigation         Ordered     heparin, porcine (PF) 100 unit/mL injection flush 500 Units  As needed (PRN)      05/27/18 1303     IP VTE LOW RISK PATIENT  Once      05/25/18 1347     Place sequential compression device  Until discontinued      05/25/18 1347          Thank you for your consult. I will follow-up with patient. Please contact us if you have any additional questions.    Khanh Arellano MD  Otorhinolaryngology-Head & Neck Surgery  [facility]

## 2018-05-28 NOTE — SUBJECTIVE & OBJECTIVE
Subjective:     Interval History:   Patient reports feeling improved overall but continues to note chest congestion. Patient reports O2 saturation has improved today. Dry cough continues. Unable to produce sputum sample at this time. Currently 97% on 5L NC. Continues to report baseline nausea controlled by Benadryl PRN.   Patient continues to complain of intermittent ear pain that comes and goes. Plan for possible PE tube placement per otologist. Mother and  at bedside. No other complaints at this time.       Continuous Infusions:  Scheduled Meds:   amLODIPine  10 mg Oral Daily    ARIPiprazole  2 mg Oral QHS    calcium-vitamin D3  1 tablet Oral BID WM    ceFEPime (MAXIPIME) IVPB  2 g Intravenous Q8H    cetirizine  5 mg Oral Daily    dapsone  100 mg Oral Daily    fluticasone  2 spray Each Nare Daily    fluticasone-salmeterol 250-50 mcg/dose  1 puff Inhalation BID    folic acid  1,000 mcg Oral Daily    gabapentin  1,100 mg Oral TID    insulin aspart U-100  5 Units Subcutaneous TIDWM    lipase-protease-amylase 24,000-76,000-120,000 units  6 capsule Oral TIDWM    lisinopril  10 mg Oral Daily    magnesium oxide  400 mg Oral BID    methylPREDNISolone (SOLU-Medrol) IVPB (doses > 250 mg)   Intravenous Daily    mirtazapine  30 mg Oral QHS    montelukast  10 mg Oral Daily    morphine  15 mg Oral TID    multivit-min-FA-coenzyme Q10 100-5 mcg-mg  1 tablet Oral BID    mycophenolate  500 mg Oral BID    polyethylene glycol  17 g Oral BID    ribavirin  400 mg Oral TID    tacrolimus  0.5 mg Oral BID    tacrolimus  3 mg Oral BID    topiramate  25 mg Oral BID    vancomycin (VANCOCIN) IVPB  750 mg Intravenous Q12H     PRN Meds:acetaminophen, albuterol, butalbital-acetaminophen-caffeine -40 mg, butalbital-acetaminophen-caffeine -40 mg, dextrose 50%, dextrose 50%, diphenhydrAMINE, glucagon (human recombinant), glucose, glucose, heparin, porcine (PF), insulin aspart U-100, lidocaine-prilocaine,  lipase-protease-amylase 24,000-76,000-120,000 units, LORazepam, ondansetron, oxyCODONE, promethazine, tiZANidine    Review of patient's allergies indicates:   Allergen Reactions    Albuterol Palpitations    Colistin Anaphylaxis    Vancomycin analogues      Infusion reaction that does not resolve with slowing    Neupogen [filgrastim] Other (See Comments)     Ostealgia after five daily doses of 300 mcg.      Bactrim [sulfamethoxazole-trimethoprim] Hives    Ceftazidime Hives     Pt stated can tolerate cefapine not ceftazidime    Ceftazidime     Dronabinol Other (See Comments)     Mental changes/hallucinations    Haldol [haloperidol lactate] Other (See Comments)     Seizure like activity    Nsaids (non-steroidal anti-inflammatory drug)      Cannot have due to lung transplant    Adhesive Rash     Cloth tape- please use tegaderm or paper tape    Aztreonam Rash    Ciprofloxacin Nausea And Vomiting     Projectile N/V, per patient.  Unwilling to retry therapy.       Review of Systems   Constitutional: Positive for activity change and fatigue. Negative for chills and fever.   HENT: Positive for congestion, ear pain and rhinorrhea. Negative for sinus pressure and sore throat.    Eyes: Negative.    Respiratory: Positive for cough and shortness of breath.    Cardiovascular: Negative for palpitations and leg swelling.   Gastrointestinal: Positive for nausea (baseline). Negative for abdominal pain, diarrhea and vomiting.   Endocrine: Negative.    Genitourinary: Negative for dysuria, frequency and urgency.   Musculoskeletal: Positive for back pain (chronic). Negative for myalgias.   Skin: Negative for color change, pallor and rash.   Allergic/Immunologic: Positive for immunocompromised state.   Neurological: Negative for dizziness, weakness and headaches.   Hematological: Negative.    Psychiatric/Behavioral: Negative for agitation and behavioral problems.     Objective:   Physical Exam   Constitutional: She is oriented  to person, place, and time. She appears well-developed and well-nourished. No distress.   HENT:   Head: Normocephalic and atraumatic.   Eyes: Conjunctivae and EOM are normal.   Neck: Normal range of motion. Neck supple.   Cardiovascular: Normal rate, regular rhythm, normal heart sounds and intact distal pulses.  Exam reveals no gallop and no friction rub.    No murmur heard.  Pulmonary/Chest: Effort normal. No respiratory distress. She has no wheezes. She has no rales.   Neurological: She is alert and oriented to person, place, and time.   Skin: Skin is warm and dry. Capillary refill takes less than 2 seconds. No rash noted. No erythema. No pallor.   Psychiatric: She has a normal mood and affect. Her behavior is normal. Judgment and thought content normal.   Nursing note and vitals reviewed.        Vital Signs (Most Recent):  Temp: 98.6 °F (37 °C) (05/28/18 1141)  Pulse: 108 (05/28/18 1141)  Resp: 12 (05/28/18 1141)  BP: 137/75 (05/28/18 1141)  SpO2: 97 % (05/28/18 1141) Vital Signs (24h Range):  Temp:  [98.6 °F (37 °C)-98.9 °F (37.2 °C)] 98.6 °F (37 °C)  Pulse:  [] 108  Resp:  [12-18] 12  SpO2:  [90 %-97 %] 97 %  BP: (113-137)/(67-75) 137/75     Weight: 54 kg (119 lb)  Body mass index is 23.24 kg/m².    No intake or output data in the 24 hours ending 05/28/18 1141    Significant Labs:  CBC:    Recent Labs  Lab 05/28/18  0600   WBC 7.87   RBC 3.12*   HGB 8.3*   HCT 28.7*   *   MCV 92   MCH 26.6*   MCHC 28.9*     BMP:    Recent Labs  Lab 05/28/18  0600      K 4.2      CO2 27   BUN 18   CREATININE 1.3   CALCIUM 9.4      Tacrolimus Levels:    Recent Labs  Lab 05/28/18  0600   TACROLIMUS 10.0     Microbiology:  Microbiology Results (last 7 days)     Procedure Component Value Units Date/Time    Culture, Respiratory  - Cystic Fibrosis [[id number]] Collected:  05/27/18 1834    Order Status:  Completed Specimen:  Respiratory from Sputum, Expectorated Updated:  05/28/18 0949     RESPIRATORY CULTURE -  CYSTIC FIBROSIS No Growth     Gram Stain (Respiratory) <10 epithelial cells per low power field.     Gram Stain (Respiratory) Many WBC's     Gram Stain (Respiratory) No organisms seen    Blood culture [[id number]] Collected:  05/25/18 1643    Order Status:  Completed Specimen:  Blood from Peripheral, Hand, Right Updated:  05/27/18 2212     Blood Culture, Routine No Growth to date     Blood Culture, Routine No Growth to date     Blood Culture, Routine No Growth to date    Culture, Respiratory  - Cystic Fibrosis [[id number]]     Order Status:  No result Specimen:  Respiratory from Sputum     Respiratory Viral Panel by PCR [facility]; Nasal Swab [[id number]] Collected:  05/26/18 1159    Order Status:  Sent Specimen:  Respiratory Updated:  05/26/18 1222          I have reviewed all pertinent labs within the past 24 hours.    Diagnostic Results:  none further

## 2018-05-28 NOTE — ASSESSMENT & PLAN NOTE
History of acute mastoiditis in April 2017. Follows with Dr. Olsen ENT.     Continue with cetirizine and montelukast.   RVP from nasal swab pending.    ENT consulted and appreciate recs.  Has a history of recurrent Pseudomonas PNA and continued sinusitis.  Plan for possible PE tube placement per otology.

## 2018-05-28 NOTE — PROGRESS NOTES
Notified Dr. Morin that pt SpO2 is 93% on 4 L NC. Also notified MD that pt is feeling very anxious and would like her ativan increased, she feels like she is getting a yeast infection, and would like to take home probiotics.

## 2018-05-28 NOTE — ASSESSMENT & PLAN NOTE
- Bilateral mucoid effusions, no evidence of ear infection  - Will discuss PE tube placement with our otologist Dr. Zelaya

## 2018-05-28 NOTE — PROGRESS NOTES
Pt request to wait to take blood sugar and insulin due to waiting on food to be brought by mother-in-law.  Will pass on to PM nurse

## 2018-05-28 NOTE — ASSESSMENT & PLAN NOTE
BG stable upon admission. Endocrinology consulted, appreciate recs.   Monitor BG as patient to receive 500 mg IV solumedrol daily from 5/27-5/29.

## 2018-05-28 NOTE — ASSESSMENT & PLAN NOTE
May increase insulin needs; prandial excursion noted.   Solumedrol 500 mg x3 first dose 5/27/18.

## 2018-05-28 NOTE — PROGRESS NOTES
"[facility]  Endocrinology  Progress Note    Admit Date: 2018     Reason for Consult: Management of steroid induced Hyperglycemia     Surgical Procedure and Date: lung transplant     Diabetes diagnosis year: reports PCP told her she had a "borderline GTT"     Home Diabetes Medications:  toujeo 3 units daily     How often checking glucose at home? Does not check   BG readings on regimen:   Hypoglycemia on the regimen?  denies s/s hypoglycemia  Missed doses on regimen?  No    Diabetes Complications include:     none    Complicating diabetes co morbidities:   Glucocorticoid use       HPI:   Patient is a 28 y.o. female with a diagnosis of CF s/p lung transplant, CF pancreatic insufficiency, admitted for SOB, cough and congestion after returning from a cruise with concern for infection. Endo consulted for BG management. Pt reports compliance with toujeo as rx by her PCP for "borderline abnormal GTT" but she does not check BG and reports she does not have a diagnosis of DM. Denies FH DM. Currently taking PDN 5mg daily.           Interval HPI:   Overnight events: remains in TSU. Solumedrol 500 mg x2 given today (will receive total of 3 doses)  Eatin% and outside snacking   Nausea: No  Hypoglycemia and intervention: No  Fever: No  TPN and/or TF: No    /67 (Patient Position: Lying)   Pulse 96   Temp 98.9 °F (37.2 °C) (Oral)   Resp 16   Ht 5' (1.524 m)   Wt 54 kg (119 lb)   LMP 10/02/2016   SpO2 (!) 93%   BMI 23.24 kg/m²       Labs Reviewed and Include      Recent Labs  Lab 18  0530      CALCIUM 9.1   ALBUMIN 3.4*   PROT 6.4      K 4.6   CO2 28      BUN 16   CREATININE 1.0   ALKPHOS 131   ALT 17   AST 15   BILITOT 0.4     Lab Results   Component Value Date    WBC 5.96 2018    HGB 8.2 (L) 2018    HCT 28.4 (L) 2018    MCV 92 2018     (L) 2018     No results for input(s): TSH, FREET4 in the last 168 hours.  Lab Results "   Component Value Date    HGBA1C 4.7 05/26/2018       Nutritional status:   Body mass index is 23.24 kg/m².  Lab Results   Component Value Date    ALBUMIN 3.4 (L) 05/27/2018    ALBUMIN 3.4 (L) 05/26/2018    ALBUMIN 3.5 05/25/2018     Lab Results   Component Value Date    PREALBUMIN 15 (L) 05/29/2014    PREALBUMIN 11 (L) 05/09/2014    PREALBUMIN 18 (L) 03/19/2014       Estimated Creatinine Clearance: 59.6 mL/min (based on SCr of 1 mg/dL).    Accu-Checks  Recent Labs      05/25/18   2359  05/26/18   0809  05/26/18   1158  05/26/18   1758  05/26/18   2300  05/27/18   0834  05/27/18   1242   POCTGLUCOSE  220*  100  143*  205*  113*  86  175*       Current Medications and/or Treatments Impacting Glycemic Control  Immunotherapy:  Immunosuppressants         Stop Route Frequency     mycophenolate capsule 500 mg      -- Oral 2 times daily     tacrolimus capsule 0.5 mg      -- Oral 2 times daily     tacrolimus capsule 3 mg      -- Oral 2 times daily        Steroids:   Hormones     Start     Stop Route Frequency Ordered    05/27/18 1315  methylPREDNISolone sodium succinate (SOLU-MEDROL) 500 mg in dextrose 5 % 100 mL IVPB      05/30 0859 IV Daily 05/27/18 1200        Pressors:    Autonomic Drugs     None        Hyperglycemia/Diabetes Medications: Antihyperglycemics     Start     Stop Route Frequency Ordered    05/26/18 0902  insulin aspart U-100 pen 0-5 Units      -- SubQ Before meals & nightly PRN 05/26/18 0802          ASSESSMENT and PLAN    * Pneumonia    On abx  Infection may increase insulin resistance.         Hyperglycemia    History suggestive of steroid induced hyperglycemia and/or CFRDM  BG goal 140-180    Increase novolog 10 units with meals.   Continue bg monitoring ac/hs and moderate dose correction scale.     Patient is snacking; discussed limiting drinks/food high in sugar or eating with meals rather than in between. Patient aware that these foods especially will increase her bg while on steroids.     Check labs  for c peptide, glucose, A1c.          Lung transplant status, bilateral    Per LUT        Current chronic use of systemic steroids    May increase insulin needs; prandial excursion noted.   Solumedrol 500 mg x3 first dose 5/27/18.             Immunosuppression    Immunosuppressants may increase insulin resistance.             Amy Zapata NP  Endocrinology  [facility]

## 2018-05-28 NOTE — PROGRESS NOTES
[facility]  Lung Transplant  Progress Note - Floor    Patient Name: Juanita Ibarra  MRN: [id number]  Admission Date: 5/25/2018  Hospital Length of Stay: 3 days  Post-Operative Day: 1446  Attending Physician: Jake Alvarez MD  Primary Care Provider: Jake Alvarez MD     Subjective:     Interval History:   Patient reports feeling improved overall but continues to note chest congestion. Patient reports O2 saturation has improved today. Dry cough continues. Unable to produce sputum sample at this time. Currently 97% on 5L NC. Continues to report baseline nausea controlled by Benadryl PRN.   Patient continues to complain of intermittent ear pain that comes and goes. Plan for possible PE tube placement per otologist. Mother and  at bedside. No other complaints at this time.       Continuous Infusions:  Scheduled Meds:   amLODIPine  10 mg Oral Daily    ARIPiprazole  2 mg Oral QHS    calcium-vitamin D3  1 tablet Oral BID WM    ceFEPime (MAXIPIME) IVPB  2 g Intravenous Q8H    cetirizine  5 mg Oral Daily    dapsone  100 mg Oral Daily    fluticasone  2 spray Each Nare Daily    fluticasone-salmeterol 250-50 mcg/dose  1 puff Inhalation BID    folic acid  1,000 mcg Oral Daily    gabapentin  1,100 mg Oral TID    insulin aspart U-100  5 Units Subcutaneous TIDWM    lipase-protease-amylase 24,000-76,000-120,000 units  6 capsule Oral TIDWM    lisinopril  10 mg Oral Daily    magnesium oxide  400 mg Oral BID    methylPREDNISolone (SOLU-Medrol) IVPB (doses > 250 mg)   Intravenous Daily    mirtazapine  30 mg Oral QHS    montelukast  10 mg Oral Daily    morphine  15 mg Oral TID    multivit-min-FA-coenzyme Q10 100-5 mcg-mg  1 tablet Oral BID    mycophenolate  500 mg Oral BID    polyethylene glycol  17 g Oral BID    ribavirin  400 mg Oral TID    tacrolimus  0.5 mg Oral BID    tacrolimus  3 mg Oral BID    topiramate  25 mg Oral BID    vancomycin (VANCOCIN) IVPB  750 mg  Intravenous Q12H     PRN Meds:acetaminophen, albuterol, butalbital-acetaminophen-caffeine -40 mg, butalbital-acetaminophen-caffeine -40 mg, dextrose 50%, dextrose 50%, diphenhydrAMINE, glucagon (human recombinant), glucose, glucose, heparin, porcine (PF), insulin aspart U-100, lidocaine-prilocaine, lipase-protease-amylase 24,000-76,000-120,000 units, LORazepam, ondansetron, oxyCODONE, promethazine, tiZANidine    Review of patient's allergies indicates:   Allergen Reactions    Albuterol Palpitations    Colistin Anaphylaxis    Vancomycin analogues      Infusion reaction that does not resolve with slowing    Neupogen [filgrastim] Other (See Comments)     Ostealgia after five daily doses of 300 mcg.      Bactrim [sulfamethoxazole-trimethoprim] Hives    Ceftazidime Hives     Pt stated can tolerate cefapine not ceftazidime    Ceftazidime     Dronabinol Other (See Comments)     Mental changes/hallucinations    Haldol [haloperidol lactate] Other (See Comments)     Seizure like activity    Nsaids (non-steroidal anti-inflammatory drug)      Cannot have due to lung transplant    Adhesive Rash     Cloth tape- please use tegaderm or paper tape    Aztreonam Rash    Ciprofloxacin Nausea And Vomiting     Projectile N/V, per patient.  Unwilling to retry therapy.       Review of Systems   Constitutional: Positive for activity change and fatigue. Negative for chills and fever.   HENT: Positive for congestion, ear pain and rhinorrhea. Negative for sinus pressure and sore throat.    Eyes: Negative.    Respiratory: Positive for cough and shortness of breath.    Cardiovascular: Negative for palpitations and leg swelling.   Gastrointestinal: Positive for nausea (baseline). Negative for abdominal pain, diarrhea and vomiting.   Endocrine: Negative.    Genitourinary: Negative for dysuria, frequency and urgency.   Musculoskeletal: Positive for back pain (chronic). Negative for myalgias.   Skin: Negative for color  change, pallor and rash.   Allergic/Immunologic: Positive for immunocompromised state.   Neurological: Negative for dizziness, weakness and headaches.   Hematological: Negative.    Psychiatric/Behavioral: Negative for agitation and behavioral problems.     Objective:   Physical Exam   Constitutional: She is oriented to person, place, and time. She appears well-developed and well-nourished. No distress.   HENT:   Head: Normocephalic and atraumatic.   Eyes: Conjunctivae and EOM are normal.   Neck: Normal range of motion. Neck supple.   Cardiovascular: Normal rate, regular rhythm, normal heart sounds and intact distal pulses.  Exam reveals no gallop and no friction rub.    No murmur heard.  Pulmonary/Chest: Effort normal. No respiratory distress. She has no wheezes. She has no rales.   Neurological: She is alert and oriented to person, place, and time.   Skin: Skin is warm and dry. Capillary refill takes less than 2 seconds. No rash noted. No erythema. No pallor.   Psychiatric: She has a normal mood and affect. Her behavior is normal. Judgment and thought content normal.   Nursing note and vitals reviewed.        Vital Signs (Most Recent):  Temp: 98.6 °F (37 °C) (05/28/18 1141)  Pulse: 108 (05/28/18 1141)  Resp: 12 (05/28/18 1141)  BP: 137/75 (05/28/18 1141)  SpO2: 97 % (05/28/18 1141) Vital Signs (24h Range):  Temp:  [98.6 °F (37 °C)-98.9 °F (37.2 °C)] 98.6 °F (37 °C)  Pulse:  [] 108  Resp:  [12-18] 12  SpO2:  [90 %-97 %] 97 %  BP: (113-137)/(67-75) 137/75     Weight: 54 kg (119 lb)  Body mass index is 23.24 kg/m².    No intake or output data in the 24 hours ending 05/28/18 1141    Significant Labs:  CBC:    Recent Labs  Lab 05/28/18  0600   WBC 7.87   RBC 3.12*   HGB 8.3*   HCT 28.7*   *   MCV 92   MCH 26.6*   MCHC 28.9*     BMP:    Recent Labs  Lab 05/28/18  0600      K 4.2      CO2 27   BUN 18   CREATININE 1.3   CALCIUM 9.4      Tacrolimus Levels:    Recent Labs  Lab 05/28/18  0600    TACROLIMUS 10.0     Microbiology:  Microbiology Results (last 7 days)     Procedure Component Value Units Date/Time    Culture, Respiratory  - Cystic Fibrosis [[id number]] Collected:  05/27/18 1834    Order Status:  Completed Specimen:  Respiratory from Sputum, Expectorated Updated:  05/28/18 0948     RESPIRATORY CULTURE - CYSTIC FIBROSIS No Growth     Gram Stain (Respiratory) <10 epithelial cells per low power field.     Gram Stain (Respiratory) Many WBC's     Gram Stain (Respiratory) No organisms seen    Blood culture [[id number]] Collected:  05/25/18 1643    Order Status:  Completed Specimen:  Blood from Peripheral, Hand, Right Updated:  05/27/18 2212     Blood Culture, Routine No Growth to date     Blood Culture, Routine No Growth to date     Blood Culture, Routine No Growth to date    Culture, Respiratory  - Cystic Fibrosis [[id number]]     Order Status:  No result Specimen:  Respiratory from Sputum     Respiratory Viral Panel by PCR [facility]; Nasal Swab [[id number]] Collected:  05/26/18 1159    Order Status:  Sent Specimen:  Respiratory Updated:  05/26/18 1222          I have reviewed all pertinent labs within the past 24 hours.    Diagnostic Results:  none further      Assessment/Plan:     * Pneumonia    Previously admitted earlier this week at outside hospital for RLL PNA.  Procalcitonin negative.  Cultures pending.  PA/Lat CXR done here shows no evidence of pneumonia.      Continue supplemental oxygen and empiric Vanc/Cefepime/Ribavirin. Patient on day 2/3 of solumedrol 500mg.   ENT evaluated for recurrent sinusitis and history of acute mastoiditis. Plan for possible PE tube placement per otologist.    Plan for bronchoscopy tomorrow to rule out rejection.  High suspicion for progression of CARLOS EDUARDO.  RVP and respiratory culture pending.         Lung transplant status, bilateral    BLT (CMV D+/R-) secondary to CF. Transplant history complicated by A1 ACR 8/2014, recurrent Candida glabrata in sputum, Pseudomonas PNA  in 2015, CMV viremia in 7/2015, and CARLOS EDUARDO grade 3.  She is admitted from [place] as a transfer for a possible right sided pneumonia.  Aspergillus, Fungitell, and CMV pending.  Procalcitonin <0.02.  Bronchoscopy from 3/2018 with negative cultures, Aspergillus, CMV, and Respiratory Pathogens Panel.      Will continue empiric coverage for PNA with Vanc/Cefepime. Complete day 2 of 3 of solumedrol 500 mg IV and complete Ribavirin 400mg po TID for 10 days to empirically cover respiratory viruses.  Continue supplemental oxygen as prescribed.  Nasal swap for RVP pending.    Plan for bronchoscopy in the OR on Tuesday.  High concerns for progression of her CARLOS EDUARDO.          Immunosuppression    Current outpatient regimen: tacrolimus 3.5 mg BID, prednisone 5 mg.  Not on MMF due to leukopenia in the past.    Will continue with current immunosuppression. Monitor daily tacrolimus levels and dose adjust accordingly. Tac level 10 today.          Prophylactic antibiotic    Current outpatient regimen: dapsone 100 mg daily.     Continue dapsone for PCP prophylaxis.  Reports severe allergy to Bactrim.         Nausea    Baseline nausea controlled with PRN IV Phenergan and PRN Benadryl.         CF related Pancreatic insufficiency    Continue current outpatient regimen of Creon.         Chronic pain with opiate use    Will continue current outpatient regimen per patient's pain management physician and PCP.         Sinusitis, chronic    History of acute mastoiditis in April 2017. Follows with Dr. Olsen ENT.     Continue with cetirizine and montelukast.   RVP from nasal swab pending.    ENT consulted and appreciate recs.  Has a history of recurrent Pseudomonas PNA and continued sinusitis.  Plan for possible PE tube placement per otology.         Hyperglycemia    BG stable upon admission. Endocrinology consulted, appreciate recs.   Monitor BG as patient to receive 500 mg IV solumedrol daily from 5/27-5/29.            Jacqueline Dumont,  DYLON  Lung Transplant  [facility]

## 2018-05-28 NOTE — HPI
28 year old female with history of CF, lung transplantation, vocal fold immobility s/p thyroplasty, chronic sinusitis s/p endoscopic sinus surgery presents to ED with nasal congestion and worsening respiratory status.  ENT is consulted for sinus congestion and drainage.    Juanita reports bright green nasal drainage after doing a sinus rinse yesterday.  She currently denies facial pain or pressure but does have worsening nasal congestion over the last 1 week.  She also reports an episode of intense left ear pain after doing her sinus rinse. She reports persistent long-standing post-nasal drip. She has been using her steroid spray and sinus rinses as prescribed.

## 2018-05-28 NOTE — ANESTHESIA PREPROCEDURE EVALUATION
[facility]  Anesthesia Pre-Operative Evaluation         Patient Name: Juanita Ibarra  YOB: 1990  MRN: [id number]    SUBJECTIVE:     Pre-operative evaluation for Procedure(s) (LRB):  BRONCHOSCOPY; Bronchoscopy with BAL and transbronchial biopsies under general anesthesia (N/A)     05/28/2018    Juanita Ibarra is a 28 y.o. 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  Her post transplant course has been complicated by Candida Glabrata positive sputum, Pseudomonas PNA in 2015, CMV viremia in 7/2015, and CARLOS EDUARDO grade 3. She presented to her PCP earlier this month for evaluation of sinusitis. She was admitted on 3/6/2018 for a 14 day course of IV abx. Regimen at the OSH included Vanc/Merrem and she was given a one time dose of Tobramycin on 3/17/2018 treated for RLL pneumonia. Presented 2/2 increased rhinorrhea, sinus congestions, CARROLL, dry cough, and home O2 sats in the high 80s. Patient is being followed by ID who initiated broad spectrum abx therapy. Plan is for bronch to send for bacterial, fungal, AFB, Aspergillus Ag, and pathology 2/2 treatment without improvement.      Of note patient has a history significant for PONV despite prophylactic treatment/TIVA and post-op itching.  She reports that she always gets TIVA and has PONV with that.  She states that ondansetron has no effect and that only IV phenergan works.  She states that she has itching after anesthesia, not sure how much opioids she gets with her procedures but she states that IV benadryl works best and she required 50 mg IV for post operative itching when she is in recovery (if she gets it intraop she has delayed emergence but never becomes sedated from it once she is in recovery)    Patient now  presents for the above procedure(s).      LDA:        Port A Cath Single Lumen right subclavian (Active)   Accessed by: Alfred Faith RN 5/27/2018  7:00 PM   Dressing Type Transparent 5/27/2018  7:00 PM   Dressing Status Biopatch in place;Clean;Dry;Intact 5/27/2018  7:00 PM   Dressing Change Due 06/03/18 5/27/2018  7:00 PM   Line Status Blood return noted;Infusing 5/27/2018  7:00 PM   Daily Line Review Performed 5/27/2018  7:16 PM   Gauge 20 5/27/2018  7:00 PM   Needle Length 3/4 in 5/27/2018  7:00 PM   Needle Insertion Date 05/27/18 5/27/2018  7:00 PM   Needle Insertion Time 1800 5/27/2018  7:00 PM   Number of days:        Prev airway:     Present Prior to Hospital Arrival?: No; Placement Date: 04/25/17; Placement Time: 0729; Method of Intubation: Alejandre; Inserted by: CRNA; Airway Device: Endotracheal Tube; Mask Ventilation: Easy; Intubated: Postinduction; Blade: Ej #3; Airway Device Size: 6.0; Style: Cuffed; Cuff Inflation: Minimal occlusive pressure; Inflation Amount: 5; Placement Verified By: Auscultation, Capnometry; Grade: Grade I; Complicating Factors: None; Intubation Findings: Positive EtCO2, Bilateral breath sounds, Atraumatic/Condition of teeth unchanged;  Depth of Insertion: 19; Securment: Lips; Complications: None; Breath Sounds: Equal Bilateral; Insertion Attempts: 1; Removal Date: 04/25/17;  Removal Time: 0904    Drips: None documented.      Patient Active Problem List   Diagnosis    Cystic fibrosis with pulmonary manifestations    Sinusitis, chronic    Anxiety disorder    Other pain disorders related to psychological factors    Nausea    Protein calorie malnutrition    CF related Pancreatic insufficiency    Chronic pain with opiate use    Allergy to multiple antibiotics    Dysphagia, oropharyngeal    Chronic visceral pain (pleura)    Transaminitis    Gallstones    Hyperglycemia    Adverse effect of glucocorticoid or synthetic analogue    Immunosuppression    Lung transplant  status, bilateral    Dysphonia    Constipation    Portacath in place    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    Prophylactic antibiotic    Complication of transplanted lung    Acute rejection of lung transplant    Debility    Muscle spasm of back    Chronic anemia    Headache    Aftercare following organ transplant    Leukopenia    SBO (small bowel obstruction)    Sterilization    Lung replaced by transplant    Fever    Cytomegalovirus (CMV) viremia    Hospital acquired PNA    Hyperkalemia    Acute kidney injury    Other complications of lung transplant    S/P lung transplant    Bilious vomiting with nausea    Acute intractable headache    Pneumonia, organism unspecified(486)    Lymphadenopathy    Biliary colic    Unilateral complete vocal fold paralysis    Bronchiolitis obliterans syndrome    Chronic ethmoidal sinusitis    Mastoiditis    Acute mucoid otitis media of right ear    Dysmenorrhea    Sinus infection    Hypertension    Pneumonia    Current chronic use of systemic steroids       Review of patient's allergies indicates:   Allergen Reactions    Albuterol Palpitations    Colistin Anaphylaxis    Vancomycin analogues      Infusion reaction that does not resolve with slowing    Neupogen [filgrastim] Other (See Comments)     Ostealgia after five daily doses of 300 mcg.      Bactrim [sulfamethoxazole-trimethoprim] Hives    Ceftazidime Hives     Pt stated can tolerate cefapine not ceftazidime    Ceftazidime     Dronabinol Other (See Comments)     Mental changes/hallucinations    Haldol [haloperidol lactate] Other (See Comments)     Seizure like activity    Nsaids (non-steroidal anti-inflammatory drug)      Cannot have due to lung transplant    Adhesive Rash     Cloth tape- please use tegaderm or paper tape    Aztreonam Rash    Ciprofloxacin Nausea And Vomiting     Projectile N/V, per patient.  Unwilling to retry therapy.       Current Inpatient  Medications:   amLODIPine  10 mg Oral Daily    ARIPiprazole  2 mg Oral QHS    calcium-vitamin D3  1 tablet Oral BID WM    ceFEPime (MAXIPIME) IVPB  2 g Intravenous Q12H    cetirizine  5 mg Oral Daily    dapsone  100 mg Oral Daily    fluticasone  2 spray Each Nare Daily    fluticasone-salmeterol 250-50 mcg/dose  1 puff Inhalation BID    folic acid  1,000 mcg Oral Daily    gabapentin  1,100 mg Oral TID    insulin aspart U-100  5 Units Subcutaneous TIDWM    lipase-protease-amylase 24,000-76,000-120,000 units  6 capsule Oral TIDWM    lisinopril  10 mg Oral Daily    magnesium oxide  400 mg Oral BID    methylPREDNISolone (SOLU-Medrol) IVPB (doses > 250 mg)   Intravenous Daily    mirtazapine  30 mg Oral QHS    montelukast  10 mg Oral Daily    morphine  15 mg Oral TID    multivit-min-FA-coenzyme Q10 100-5 mcg-mg  1 tablet Oral BID    mycophenolate  500 mg Oral BID    polyethylene glycol  17 g Oral BID    ribavirin  400 mg Oral TID    tacrolimus  0.5 mg Oral BID    tacrolimus  3 mg Oral BID    topiramate  25 mg Oral BID    vancomycin (VANCOCIN) IVPB  15 mg/kg Intravenous Q24H       No current facility-administered medications on file prior to encounter.      Current Outpatient Prescriptions on File Prior to Encounter   Medication Sig Dispense Refill    amlodipine (NORVASC) 5 MG tablet Take 10 mg by mouth once daily.       aripiprazole (ABILIFY) 2 MG Tab Take 2 mg by mouth once daily.      butalbital-acetaminophen-caffeine -40 mg (FIORICET, ESGIC) -40 mg per tablet TAKE 1 TABLET BY MOUTH EVERY 6 HOURS AS NEEDED FOR HEADACHE 120 tablet 0    calcium-vitamin D3 500 mg(1,250mg) -200 unit per tablet Take 1 tablet by mouth 2 (two) times daily with meals. 60 tablet 11    CREON 24,000-76,000 -120,000 unit capsule TAKE 5 CAPSULES BY MOUTH WITH MEALS AND 4 CAPSULES WITH SNACKS EVERYDAY 810 capsule 0    dapsone 100 MG Tab TAKE 1 TABLET BY MOUTH EVERY DAY 30 tablet 11    diphenhydrAMINE  (BENADRYL) 50 MG tablet Take 1 tablet (50 mg total) by mouth every 6 (six) hours as needed for Itching. 1 tablet 0    DULERA 100-5 mcg/actuation HFAA INHALE 1 PUFF BY MOUTH TWICE DAILY 13 g 0    fexofenadine (ALLEGRA) 180 MG tablet Take 180 mg by mouth once daily.      fluconazole (DIFLUCAN) 150 MG Tab TAKE 1 TABLET BY MOUTH EVERY WEEK (Patient taking differently: TAKE 1 TABLET BY MOUTH EVERY WEEK PRN VAGINAL YEAST S/S) 30 tablet 0    fluticasone (FLONASE) 50 mcg/actuation nasal spray INSERT 2 SPRAYS IN EACH NOSTRIL DAILY 16 g 5    folic acid (FOLVITE) 1 MG tablet Take 1 tablet (1,000 mcg total) by mouth once daily. 30 tablet 11    gabapentin (NEURONTIN) 300 MG capsule Take 1 capsule (300 mg total) by mouth 3 (three) times daily. (Patient taking differently: Take 1,100 mg by mouth 3 (three) times daily. ) 90 capsule 11    insulin glargine, TOUJEO, (TOUJEO) 300 unit/mL (1.5 mL) InPn pen Inject 3 Units into the skin once daily.       levalbuterol (XOPENEX HFA) 45 mcg/actuation inhaler INHALE 1 TO 2 PUFFS INTO THE LUNGS EVERY 6 HOURS AS NEEDED FOR WHEEZING OR SHORTNESS OF BREATH. USE WITH SPACER. 15 g 0    lisinopril 10 MG tablet Take 10 mg by mouth once daily.      LORazepam (ATIVAN) 1 MG tablet Take 1 mg by mouth every 6 (six) hours as needed for Anxiety.      magnesium oxide (MAG-OX) 400 mg tablet Take 1 tablet (400 mg total) by mouth 2 (two) times daily. 60 tablet 11    mirtazapine (REMERON) 30 MG tablet Take 30 mg by mouth every evening.      montelukast (SINGULAIR) 10 mg tablet TAKE 1 TABLET BY MOUTH EVERY DAY 30 tablet 11    morphine (MS CONTIN) 15 MG 12 hr tablet Take 15 mg by mouth 3 (three) times daily.      mv. min cmb#[id number] (AQUADEKS) 100-700-10 mcg-mcg-mg Cap cap Take 1 capsule by mouth 2 (two) times daily. 60 capsule 11    mycophenolate (CELLCEPT) 250 mg Cap Take 2 capsules (500 mg total) by mouth 2 (two) times daily. 120 capsule 11    omeprazole (PRILOSEC) 40 MG capsule TAKE 1  CAPSULE BY MOUTH EVERY MORNING 30 capsule 11    ondansetron (ZOFRAN) 8 MG tablet TAKE 1 TABLET(8 MG) BY MOUTH EVERY 8 HOURS AS NEEDED 30 tablet 0    oxycodone (ROXICODONE) 5 MG immediate release tablet Take 10 mg by mouth every 4 (four) hours as needed for Pain.      polyethylene glycol (GLYCOLAX) 17 gram PwPk Take 17 g by mouth 2 (two) times daily. 60 packet 11    predniSONE (DELTASONE) 5 MG tablet Take 5 mg by mouth once daily.      PROCHAMBER USE AS DIRECTED 1 Device 0    promethazine (PHENERGAN) 25 MG tablet TAKE 1 TABLET BY MOUTH EVERY 8 HOURS AS NEEDED FOR NAUSEA 45 tablet 0    tacrolimus (PROGRAF) 0.5 MG Cap Take 1 capsule (0.5 mg total) by mouth every 12 (twelve) hours. 60 capsule 11    tacrolimus (PROGRAF) 1 MG Cap Take 3 capsules (3 mg total) by mouth every 12 (twelve) hours. Daily doses: 3.5 mg every 12 hours. 180 capsule 11    tiZANidine (ZANAFLEX) 4 MG tablet TAKE 2 TABLETS BY MOUTH EVERY 6 HOURS AS NEEDED 90 tablet 0       Past Surgical History:   Procedure Laterality Date    ABDOMINAL SURGERY      peg tube     CHOLECYSTECTOMY  2016    ENDOMETRIAL ABLATION  2015    KJB    FESS      LARYNX SURGERY  2016    LUNG TRANSPLANT Bilateral 6/12/14    MYRINGOTOMY W/ TUBES Right 04/2017    PORTACATH PLACEMENT Right     rt sc    SALPINGECTOMY Bilateral 2015    KJB       Social History     Social History    Marital status: Single     Spouse name: N/A    Number of children: N/A    Years of education: N/A     Occupational History    Not on file.     Social History Main Topics    Smoking status: Never Smoker    Smokeless tobacco: Never Used    Alcohol use No      Comment: Has not had an alcoholic drink in more than 2 months.    Drug use: No    Sexual activity: Yes     Partners: Male     Birth control/ protection: Implant     Other Topics Concern    Not on file     Social History Narrative    No narrative on file       OBJECTIVE:     Vital Signs Range (Last 24H):  Temp:  [37 °C (98.6  °F)-37.2 °C (98.9 °F)]   Pulse:  []   Resp:  [13-18]   BP: (113-126)/(67-71)   SpO2:  [90 %-94 %]       CBC:   Recent Labs      18   0530  18   0600   WBC  5.96  7.87   RBC  3.09*  3.12*   HGB  8.2*  8.3*   HCT  28.4*  28.7*   PLT  121*  132*   MCV  92  92   MCH  26.5*  26.6*   MCHC  28.9*  28.9*       CMP:   Recent Labs      18   0530  18   0600   NA  142  145   K  4.6  4.2   CL  108  110   CO2  28  27   BUN  16  18   CREATININE  1.0  1.3   GLU  101  162*   MG  1.7  2.0   CALCIUM  9.1  9.4   ALBUMIN  3.4*  3.6   PROT  6.4  7.2   ALKPHOS  131  136*   ALT  17  16   AST  15  11   BILITOT  0.4  0.4       INR:  No results for input(s): PT, INR, PROTIME, APTT in the last 72 hours.    Diagnostic Studies:    EK/22/16    Test Reason : K80.50  Vent. Rate : 082 BPM     Atrial Rate : 082 BPM     P-R Int : 132 ms          QRS Dur : 084 ms      QT Int : 368 ms       P-R-T Axes : 069 074 076 degrees     QTc Int : 429 ms    Normal sinus rhythm  Possible Left atrial enlargement  mild  Nonspecific T wave abnormality  Borderline Abnormal ECG  When compared with ECG of 19-MAY-2016 11:31,  Nonspecific T wave abnormality   is present      2D ECHO:  No results found. However, due to the size of the patient record, not all encounters were searched. Please check Results Review for a complete set of results.      ASSESSMENT/PLAN:         Anesthesia Evaluation    I have reviewed the Patient Summary Reports.    I have reviewed the Nursing Notes.   I have reviewed the Medications.   Prednisone    Review of Systems  Anesthesia Hx:  Hx of Anesthetic complications  History of prior surgery of interest to airway management or planning: (laryngeal reconstruction) lung surgery. Previous anesthesia: General Airway issues documented on chart review include GETA  Denies Family Hx of Anesthesia complications.  Personal Hx of Anesthesia complications (post-op pruritis ), Post-Operative Nausea/Vomiting (no nausea with  TIVA and prophylaxtic meds), with every anesthetic, despite treatment   Social:  Non-Smoker    Hematology/Oncology:         -- Anemia (chronic anemia):   EENT/Dental:   chronic allergic rhinitis Laryngeal reconstruction August 3, 2016 (vocal paralysis after transplant suirgery)   Otitis Media   Cardiovascular:   Hypertension, well controlled    Pulmonary:   Pneumonia Shortness of breath Cystic Fibrosis  S/P Bilateral Lung Transplant June 2014   Renal/:   Chronic Renal Disease    Hepatic/GI:   GERD, well controlled Pancreatic insufficiency as result of CF   Musculoskeletal:   chronic back pain as result of muscular injury and rib fractures as result of excessive coughing    Neurological:   Headaches Seizures (years ago, once, drug side effect)   Chronic Pain Syndrome   Endocrine:   Diabetes, well controlled    Psych:   Psychiatric History anxiety          Physical Exam  General:  Well nourished    Airway/Jaw/Neck:  Airway Findings: Mouth Opening: Small, but > 3cm General Airway Assessment: Adult  S/p laryngeal reconstruction  5.5 ETT  Mallampati: III  Improves to II with phonation.  TM Distance: Normal, at least 6 cm  Jaw/Neck Findings:  Neck ROM: Extension Decreased, Mild  Neck Findings:  Short Neck      Dental:  Dental Findings: In tact   Chest/Lungs:  Chest/Lungs Findings: Normal Respiratory Rate     Heart/Vascular:  Heart Findings: Rate: Normal  Rhythm: Regular Rhythm  Sounds: Normal  Heart murmur: negative Vascular Findings: Normal    Abdomen:  Abdomen Findings: Normal    Musculoskeletal:  Musculoskeletal Findings: Normal   Skin:  Skin Findings: Normal    Mental Status:  Mental Status Findings: (extremely knowledgeable about medical history)  Cooperative, Alert and Oriented         Anesthesia Plan  Type of Anesthesia, risks & benefits discussed:  Anesthesia Type:  general, MAC  Patient's Preference:   Intra-op Monitoring Plan: standard ASA monitors  Intra-op Monitoring Plan Comments:   Post Op Pain Control  Plan: multimodal analgesia and per primary service following discharge from PACU  Post Op Pain Control Plan Comments:   Induction:   IV  Beta Blocker:  Patient is on a Beta-Blocker and has received one dose within the past 24 hours (No further documentation required).       Informed Consent: Patient understands risks and agrees with Anesthesia plan.  Questions answered. Anesthesia consent signed with patient.  ASA Score: 4     Day of Surgery Review of History & Physical:  There are no significant changes.  H&P update referred to the surgeon.         Ready For Surgery From Anesthesia Perspective.

## 2018-05-28 NOTE — ASSESSMENT & PLAN NOTE
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  She is admitted from [place] as a transfer for a possible right sided pneumonia.  Aspergillus, Fungitell, and CMV pending.  Procalcitonin <0.02.  Bronchoscopy from 3/2018 with negative cultures, Aspergillus, CMV, and Respiratory Pathogens Panel.      Will continue empiric coverage for PNA with Vanc/Cefepime. Complete day 2 of 3 of solumedrol 500 mg IV and complete Ribavirin 400mg po TID for 10 days to empirically cover respiratory viruses.  Continue supplemental oxygen as prescribed.  Nasal swap for RVP pending.    Plan for bronchoscopy in the OR on Tuesday.  High concerns for progression of her CARLOS EDUARDO.

## 2018-05-28 NOTE — ASSESSMENT & PLAN NOTE
Previously admitted earlier this week at outside hospital for RLL PNA.  Procalcitonin negative.  Cultures pending.  PA/Lat CXR done here shows no evidence of pneumonia.      Continue supplemental oxygen and empiric Vanc/Cefepime/Ribavirin. Patient on day 2/3 of solumedrol 500mg.   ENT evaluated for recurrent sinusitis and history of acute mastoiditis. Plan for possible PE tube placement per otologist.    Plan for bronchoscopy tomorrow to rule out rejection.  High suspicion for progression of CARLOS EDUARDO.  RVP and respiratory culture pending.

## 2018-05-29 ENCOUNTER — ANESTHESIA (OUTPATIENT)
Dept: SURGERY | Facility: HOSPITAL | Age: 28
DRG: 166 | End: 2018-05-29
Payer: MEDICAID

## 2018-05-29 ENCOUNTER — SURGERY (OUTPATIENT)
Age: 28
End: 2018-05-29

## 2018-05-29 LAB
ALBUMIN SERPL BCP-MCNC: 3.2 G/DL
ALP SERPL-CCNC: 108 U/L
ALT SERPL W/O P-5'-P-CCNC: 14 U/L
ANION GAP SERPL CALC-SCNC: 7 MMOL/L
APPEARANCE FLD: CLEAR
AST SERPL-CCNC: 8 U/L
BASOPHILS # BLD AUTO: 0.01 K/UL
BASOPHILS NFR BLD: 0.1 %
BILIRUB SERPL-MCNC: 0.4 MG/DL
BODY FLD TYPE: NORMAL
BUN SERPL-MCNC: 20 MG/DL
C PEPTIDE SERPL-MCNC: 4.4 NG/ML
CALCIUM SERPL-MCNC: 8.4 MG/DL
CHLORIDE SERPL-SCNC: 112 MMOL/L
CMV DNA SERPL NAA+PROBE-ACNC: NORMAL IU/ML
CO2 SERPL-SCNC: 23 MMOL/L
COLOR FLD: COLORLESS
CREAT SERPL-MCNC: 1.2 MG/DL
DIFFERENTIAL METHOD: ABNORMAL
EOSINOPHIL # BLD AUTO: 0 K/UL
EOSINOPHIL NFR BLD: 0 %
ERYTHROCYTE [DISTWIDTH] IN BLOOD BY AUTOMATED COUNT: 16.6 %
EST. GFR  (AFRICAN AMERICAN): >60 ML/MIN/1.73 M^2
EST. GFR  (NON AFRICAN AMERICAN): >60 ML/MIN/1.73 M^2
GALACTOMANNAN AG SERPL IA-ACNC: <0.5 INDEX
GLUCOSE SERPL-MCNC: 342 MG/DL
HCT VFR BLD AUTO: 25.9 %
HGB BLD-MCNC: 7.3 G/DL
IMM GRANULOCYTES # BLD AUTO: 0.07 K/UL
IMM GRANULOCYTES NFR BLD AUTO: 0.6 %
LYMPHOCYTES # BLD AUTO: 0.3 K/UL
LYMPHOCYTES NFR BLD: 2.5 %
LYMPHOCYTES NFR FLD MANUAL: 5 %
MAGNESIUM SERPL-MCNC: 1.5 MG/DL
MCH RBC QN AUTO: 26.6 PG
MCHC RBC AUTO-ENTMCNC: 28.2 G/DL
MCV RBC AUTO: 95 FL
MONOCYTES # BLD AUTO: 0.4 K/UL
MONOCYTES NFR BLD: 3.4 %
MONOS+MACROS NFR FLD MANUAL: 81 %
NEUTROPHILS # BLD AUTO: 11.5 K/UL
NEUTROPHILS NFR BLD: 93.4 %
NEUTROPHILS NFR FLD MANUAL: 14 %
NRBC BLD-RTO: 0 /100 WBC
PLATELET # BLD AUTO: 122 K/UL
PMV BLD AUTO: 11.3 FL
POCT GLUCOSE: 168 MG/DL (ref 70–110)
POCT GLUCOSE: 268 MG/DL (ref 70–110)
POCT GLUCOSE: 274 MG/DL (ref 70–110)
POCT GLUCOSE: 294 MG/DL (ref 70–110)
POTASSIUM SERPL-SCNC: 4.1 MMOL/L
PROT SERPL-MCNC: 6.1 G/DL
RBC # BLD AUTO: 2.74 M/UL
SODIUM SERPL-SCNC: 142 MMOL/L
TACROLIMUS BLD-MCNC: 5.7 NG/ML
WBC # BLD AUTO: 12.28 K/UL
WBC # FLD: 50 /CU MM

## 2018-05-29 PROCEDURE — 87106 FUNGI IDENTIFICATION YEAST: CPT | Mod: 59

## 2018-05-29 PROCEDURE — 36000706: Performed by: INTERNAL MEDICINE

## 2018-05-29 PROCEDURE — 31624 DX BRONCHOSCOPE/LAVAGE: CPT | Mod: 59,RT,, | Performed by: INTERNAL MEDICINE

## 2018-05-29 PROCEDURE — D9220A PRA ANESTHESIA: Mod: CRNA,,, | Performed by: NURSE ANESTHETIST, CERTIFIED REGISTERED

## 2018-05-29 PROCEDURE — 25000003 PHARM REV CODE 250: Performed by: PHYSICIAN ASSISTANT

## 2018-05-29 PROCEDURE — 88305 TISSUE EXAM BY PATHOLOGIST: CPT | Mod: 26,,, | Performed by: PATHOLOGY

## 2018-05-29 PROCEDURE — 87015 SPECIMEN INFECT AGNT CONCNTJ: CPT

## 2018-05-29 PROCEDURE — 25000003 PHARM REV CODE 250: Performed by: INTERNAL MEDICINE

## 2018-05-29 PROCEDURE — 80197 ASSAY OF TACROLIMUS: CPT

## 2018-05-29 PROCEDURE — 0BBF8ZX EXCISION OF RIGHT LOWER LUNG LOBE, VIA NATURAL OR ARTIFICIAL OPENING ENDOSCOPIC, DIAGNOSTIC: ICD-10-PCS | Performed by: INTERNAL MEDICINE

## 2018-05-29 PROCEDURE — S0028 INJECTION, FAMOTIDINE, 20 MG: HCPCS | Performed by: NURSE ANESTHETIST, CERTIFIED REGISTERED

## 2018-05-29 PROCEDURE — 87541 LEGION PNEUMO DNA AMP PROB: CPT

## 2018-05-29 PROCEDURE — 20600001 HC STEP DOWN PRIVATE ROOM

## 2018-05-29 PROCEDURE — 63600175 PHARM REV CODE 636 W HCPCS: Performed by: PHYSICIAN ASSISTANT

## 2018-05-29 PROCEDURE — 87305 ASPERGILLUS AG IA: CPT

## 2018-05-29 PROCEDURE — 87496 CYTOMEG DNA AMP PROBE: CPT

## 2018-05-29 PROCEDURE — 88305 TISSUE EXAM BY PATHOLOGIST: CPT | Performed by: PATHOLOGY

## 2018-05-29 PROCEDURE — 88313 SPECIAL STAINS GROUP 2: CPT | Mod: 26,,, | Performed by: PATHOLOGY

## 2018-05-29 PROCEDURE — 94761 N-INVAS EAR/PLS OXIMETRY MLT: CPT

## 2018-05-29 PROCEDURE — 87205 SMEAR GRAM STAIN: CPT

## 2018-05-29 PROCEDURE — 99232 SBSQ HOSP IP/OBS MODERATE 35: CPT | Mod: 25,,, | Performed by: INTERNAL MEDICINE

## 2018-05-29 PROCEDURE — 85025 COMPLETE CBC W/AUTO DIFF WBC: CPT

## 2018-05-29 PROCEDURE — 63600175 PHARM REV CODE 636 W HCPCS: Performed by: INTERNAL MEDICINE

## 2018-05-29 PROCEDURE — 99232 SBSQ HOSP IP/OBS MODERATE 35: CPT | Mod: ,,, | Performed by: INTERNAL MEDICINE

## 2018-05-29 PROCEDURE — 0B9D8ZX DRAINAGE OF RIGHT MIDDLE LUNG LOBE, VIA NATURAL OR ARTIFICIAL OPENING ENDOSCOPIC, DIAGNOSTIC: ICD-10-PCS | Performed by: INTERNAL MEDICINE

## 2018-05-29 PROCEDURE — 36000707: Performed by: INTERNAL MEDICINE

## 2018-05-29 PROCEDURE — 63600175 PHARM REV CODE 636 W HCPCS: Performed by: NURSE ANESTHETIST, CERTIFIED REGISTERED

## 2018-05-29 PROCEDURE — 87206 SMEAR FLUORESCENT/ACID STAI: CPT

## 2018-05-29 PROCEDURE — 37000009 HC ANESTHESIA EA ADD 15 MINS: Performed by: INTERNAL MEDICINE

## 2018-05-29 PROCEDURE — 25000003 PHARM REV CODE 250: Performed by: NURSE ANESTHETIST, CERTIFIED REGISTERED

## 2018-05-29 PROCEDURE — 87070 CULTURE OTHR SPECIMN AEROBIC: CPT

## 2018-05-29 PROCEDURE — 88305 TISSUE EXAM BY PATHOLOGIST: CPT | Mod: 26,59,, | Performed by: PATHOLOGY

## 2018-05-29 PROCEDURE — 80053 COMPREHEN METABOLIC PANEL: CPT

## 2018-05-29 PROCEDURE — 63600175 PHARM REV CODE 636 W HCPCS: Performed by: ANESTHESIOLOGY

## 2018-05-29 PROCEDURE — 27000221 HC OXYGEN, UP TO 24 HOURS

## 2018-05-29 PROCEDURE — 37000008 HC ANESTHESIA 1ST 15 MINUTES: Performed by: INTERNAL MEDICINE

## 2018-05-29 PROCEDURE — 71000033 HC RECOVERY, INTIAL HOUR: Performed by: INTERNAL MEDICINE

## 2018-05-29 PROCEDURE — 87102 FUNGUS ISOLATION CULTURE: CPT

## 2018-05-29 PROCEDURE — 31628 BRONCHOSCOPY/LUNG BX EACH: CPT | Mod: RT,,, | Performed by: INTERNAL MEDICINE

## 2018-05-29 PROCEDURE — 87116 MYCOBACTERIA CULTURE: CPT

## 2018-05-29 PROCEDURE — 71000039 HC RECOVERY, EACH ADD'L HOUR: Performed by: INTERNAL MEDICINE

## 2018-05-29 PROCEDURE — 83735 ASSAY OF MAGNESIUM: CPT

## 2018-05-29 PROCEDURE — 88312 SPECIAL STAINS GROUP 1: CPT | Mod: 26,,, | Performed by: PATHOLOGY

## 2018-05-29 PROCEDURE — D9220A PRA ANESTHESIA: Mod: ANES,,, | Performed by: ANESTHESIOLOGY

## 2018-05-29 PROCEDURE — 88112 CYTOPATH CELL ENHANCE TECH: CPT | Mod: 26,,, | Performed by: PATHOLOGY

## 2018-05-29 PROCEDURE — 89051 BODY FLUID CELL COUNT: CPT

## 2018-05-29 PROCEDURE — 27201423 OPTIME MED/SURG SUP & DEVICES STERILE SUPPLY: Performed by: INTERNAL MEDICINE

## 2018-05-29 RX ORDER — FAMOTIDINE 10 MG/ML
INJECTION INTRAVENOUS
Status: DISCONTINUED | OUTPATIENT
Start: 2018-05-29 | End: 2018-05-29

## 2018-05-29 RX ORDER — DIPHENHYDRAMINE HYDROCHLORIDE 50 MG/ML
25 INJECTION INTRAMUSCULAR; INTRAVENOUS EVERY 6 HOURS PRN
Status: DISCONTINUED | OUTPATIENT
Start: 2018-05-29 | End: 2018-05-29 | Stop reason: HOSPADM

## 2018-05-29 RX ORDER — PROMETHAZINE HYDROCHLORIDE 25 MG/ML
INJECTION, SOLUTION INTRAMUSCULAR; INTRAVENOUS
Status: DISCONTINUED | OUTPATIENT
Start: 2018-05-29 | End: 2018-05-29

## 2018-05-29 RX ORDER — KETAMINE HCL IN 0.9 % NACL 50 MG/5 ML
SYRINGE (ML) INTRAVENOUS
Status: DISCONTINUED | OUTPATIENT
Start: 2018-05-29 | End: 2018-05-29

## 2018-05-29 RX ORDER — FENTANYL CITRATE 50 UG/ML
25 INJECTION, SOLUTION INTRAMUSCULAR; INTRAVENOUS EVERY 5 MIN PRN
Status: DISCONTINUED | OUTPATIENT
Start: 2018-05-29 | End: 2018-05-29 | Stop reason: HOSPADM

## 2018-05-29 RX ORDER — PROMETHAZINE HYDROCHLORIDE 25 MG/ML
INJECTION, SOLUTION INTRAMUSCULAR; INTRAVENOUS
Status: COMPLETED
Start: 2018-05-29 | End: 2018-05-29

## 2018-05-29 RX ORDER — LIDOCAINE HCL/PF 100 MG/5ML
SYRINGE (ML) INTRAVENOUS
Status: DISCONTINUED | OUTPATIENT
Start: 2018-05-29 | End: 2018-05-29

## 2018-05-29 RX ORDER — SODIUM CHLORIDE 9 MG/ML
10 INJECTION, SOLUTION INTRAVENOUS CONTINUOUS
Status: DISCONTINUED | OUTPATIENT
Start: 2018-05-29 | End: 2018-05-31 | Stop reason: HOSPADM

## 2018-05-29 RX ORDER — OXYCODONE HYDROCHLORIDE 5 MG/1
5 TABLET ORAL
Status: DISCONTINUED | OUTPATIENT
Start: 2018-05-29 | End: 2018-05-29 | Stop reason: HOSPADM

## 2018-05-29 RX ORDER — FENTANYL CITRATE 50 UG/ML
INJECTION, SOLUTION INTRAMUSCULAR; INTRAVENOUS
Status: DISCONTINUED | OUTPATIENT
Start: 2018-05-29 | End: 2018-05-29

## 2018-05-29 RX ORDER — MIDAZOLAM HYDROCHLORIDE 1 MG/ML
INJECTION, SOLUTION INTRAMUSCULAR; INTRAVENOUS
Status: DISCONTINUED | OUTPATIENT
Start: 2018-05-29 | End: 2018-05-29

## 2018-05-29 RX ORDER — PROPOFOL 10 MG/ML
VIAL (ML) INTRAVENOUS
Status: DISCONTINUED | OUTPATIENT
Start: 2018-05-29 | End: 2018-05-29

## 2018-05-29 RX ORDER — PROPOFOL 10 MG/ML
VIAL (ML) INTRAVENOUS CONTINUOUS PRN
Status: DISCONTINUED | OUTPATIENT
Start: 2018-05-29 | End: 2018-05-29

## 2018-05-29 RX ADMIN — PROPOFOL 140 MG: 10 INJECTION, EMULSION INTRAVENOUS at 07:05

## 2018-05-29 RX ADMIN — TACROLIMUS 3 MG: 1 CAPSULE ORAL at 11:05

## 2018-05-29 RX ADMIN — MYCOPHENOLATE MOFETIL 500 MG: 250 CAPSULE ORAL at 09:05

## 2018-05-29 RX ADMIN — Medication 1 TABLET: at 01:05

## 2018-05-29 RX ADMIN — MAGNESIUM SULFATE IN WATER 2 G: 40 INJECTION, SOLUTION INTRAVENOUS at 11:05

## 2018-05-29 RX ADMIN — CETIRIZINE HYDROCHLORIDE 5 MG: 5 TABLET, FILM COATED ORAL at 11:05

## 2018-05-29 RX ADMIN — FLUTICASONE PROPIONATE 100 MCG: 50 SPRAY, METERED NASAL at 11:05

## 2018-05-29 RX ADMIN — OXYCODONE HYDROCHLORIDE 10 MG: 5 TABLET ORAL at 06:05

## 2018-05-29 RX ADMIN — ACETAMINOPHEN 1000 MG: 500 TABLET, FILM COATED ORAL at 05:05

## 2018-05-29 RX ADMIN — AMLODIPINE BESYLATE 10 MG: 10 TABLET ORAL at 11:05

## 2018-05-29 RX ADMIN — TACROLIMUS 0.5 MG: 0.5 CAPSULE ORAL at 11:05

## 2018-05-29 RX ADMIN — FAMOTIDINE 20 MG: 10 INJECTION, SOLUTION INTRAVENOUS at 07:05

## 2018-05-29 RX ADMIN — DIPHENHYDRAMINE HYDROCHLORIDE 50 MG: 50 INJECTION, SOLUTION INTRAMUSCULAR; INTRAVENOUS at 05:05

## 2018-05-29 RX ADMIN — TACROLIMUS 0.5 MG: 0.5 CAPSULE ORAL at 05:05

## 2018-05-29 RX ADMIN — LORAZEPAM 2 MG: 1 TABLET ORAL at 02:05

## 2018-05-29 RX ADMIN — FOLIC ACID 1000 MCG: 1 TABLET ORAL at 11:05

## 2018-05-29 RX ADMIN — TOPIRAMATE 25 MG: 25 TABLET, FILM COATED ORAL at 11:05

## 2018-05-29 RX ADMIN — VANCOMYCIN HYDROCHLORIDE 750 MG: 10 INJECTION, POWDER, LYOPHILIZED, FOR SOLUTION INTRAVENOUS at 12:05

## 2018-05-29 RX ADMIN — INSULIN ASPART 3 UNITS: 100 INJECTION, SOLUTION INTRAVENOUS; SUBCUTANEOUS at 02:05

## 2018-05-29 RX ADMIN — MIDAZOLAM HYDROCHLORIDE 2 MG: 1 INJECTION, SOLUTION INTRAMUSCULAR; INTRAVENOUS at 07:05

## 2018-05-29 RX ADMIN — LORAZEPAM 2 MG: 1 TABLET ORAL at 01:05

## 2018-05-29 RX ADMIN — OXYCODONE HYDROCHLORIDE 10 MG: 5 TABLET ORAL at 11:05

## 2018-05-29 RX ADMIN — VANCOMYCIN HYDROCHLORIDE 750 MG: 10 INJECTION, POWDER, LYOPHILIZED, FOR SOLUTION INTRAVENOUS at 01:05

## 2018-05-29 RX ADMIN — SODIUM CHLORIDE 10 ML/HR: 0.9 INJECTION, SOLUTION INTRAVENOUS at 06:05

## 2018-05-29 RX ADMIN — Medication 30 MG: at 07:05

## 2018-05-29 RX ADMIN — POLYETHYLENE GLYCOL 3350 17 G: 17 POWDER, FOR SOLUTION ORAL at 01:05

## 2018-05-29 RX ADMIN — Medication 400 MG: at 11:05

## 2018-05-29 RX ADMIN — LISINOPRIL 10 MG: 2.5 TABLET ORAL at 11:05

## 2018-05-29 RX ADMIN — FENTANYL CITRATE 100 MCG: 50 INJECTION, SOLUTION INTRAMUSCULAR; INTRAVENOUS at 07:05

## 2018-05-29 RX ADMIN — CEFEPIME 2 G: 2 INJECTION, POWDER, FOR SOLUTION INTRAVENOUS at 01:05

## 2018-05-29 RX ADMIN — ACETAMINOPHEN 1000 MG: 500 TABLET, FILM COATED ORAL at 09:05

## 2018-05-29 RX ADMIN — PANCRELIPASE 6 CAPSULE: 24000; 76000; 120000 CAPSULE, DELAYED RELEASE PELLETS ORAL at 05:05

## 2018-05-29 RX ADMIN — DIPHENHYDRAMINE HYDROCHLORIDE 50 MG: 50 INJECTION, SOLUTION INTRAMUSCULAR; INTRAVENOUS at 01:05

## 2018-05-29 RX ADMIN — CEFEPIME 2 G: 2 INJECTION, POWDER, FOR SOLUTION INTRAVENOUS at 05:05

## 2018-05-29 RX ADMIN — TIZANIDINE 2 MG: 2 TABLET ORAL at 05:05

## 2018-05-29 RX ADMIN — GABAPENTIN 1100 MG: 400 CAPSULE ORAL at 09:05

## 2018-05-29 RX ADMIN — MORPHINE SULFATE 15 MG: 15 TABLET, EXTENDED RELEASE ORAL at 03:05

## 2018-05-29 RX ADMIN — GABAPENTIN 1100 MG: 400 CAPSULE ORAL at 11:05

## 2018-05-29 RX ADMIN — MORPHINE SULFATE 15 MG: 15 TABLET, EXTENDED RELEASE ORAL at 09:05

## 2018-05-29 RX ADMIN — RIBAVIRIN 400 MG: 200 CAPSULE ORAL at 09:05

## 2018-05-29 RX ADMIN — MYCOPHENOLATE MOFETIL 500 MG: 250 CAPSULE ORAL at 11:05

## 2018-05-29 RX ADMIN — POLYETHYLENE GLYCOL 3350 17 G: 17 POWDER, FOR SOLUTION ORAL at 09:05

## 2018-05-29 RX ADMIN — INSULIN ASPART 5 UNITS: 100 INJECTION, SOLUTION INTRAVENOUS; SUBCUTANEOUS at 06:05

## 2018-05-29 RX ADMIN — DAPSONE 100 MG: 100 TABLET ORAL at 11:05

## 2018-05-29 RX ADMIN — METHYLPREDNISOLONE SODIUM SUCCINATE: 1 INJECTION, POWDER, LYOPHILIZED, FOR SOLUTION INTRAMUSCULAR; INTRAVENOUS at 01:05

## 2018-05-29 RX ADMIN — Medication 400 MG: at 09:05

## 2018-05-29 RX ADMIN — TIZANIDINE 6 MG: 2 TABLET ORAL at 01:05

## 2018-05-29 RX ADMIN — OXYCODONE HYDROCHLORIDE 10 MG: 5 TABLET ORAL at 02:05

## 2018-05-29 RX ADMIN — FLUTICASONE PROPIONATE AND SALMETEROL 1 PUFF: 50; 250 POWDER RESPIRATORY (INHALATION) at 09:05

## 2018-05-29 RX ADMIN — MONTELUKAST SODIUM 10 MG: 10 TABLET, FILM COATED ORAL at 11:05

## 2018-05-29 RX ADMIN — MIRTAZAPINE 30 MG: 15 TABLET, FILM COATED ORAL at 09:05

## 2018-05-29 RX ADMIN — TACROLIMUS 3 MG: 1 CAPSULE ORAL at 05:05

## 2018-05-29 RX ADMIN — CEFEPIME 2 G: 2 INJECTION, POWDER, FOR SOLUTION INTRAVENOUS at 09:05

## 2018-05-29 RX ADMIN — PROPOFOL 200 MCG/KG/MIN: 10 INJECTION, EMULSION INTRAVENOUS at 07:05

## 2018-05-29 RX ADMIN — DIPHENHYDRAMINE HYDROCHLORIDE 25 MG: 50 INJECTION, SOLUTION INTRAMUSCULAR; INTRAVENOUS at 09:05

## 2018-05-29 RX ADMIN — LIDOCAINE HYDROCHLORIDE 60 MG: 20 INJECTION, SOLUTION INTRAVENOUS at 07:05

## 2018-05-29 RX ADMIN — INSULIN ASPART 10 UNITS: 100 INJECTION, SOLUTION INTRAVENOUS; SUBCUTANEOUS at 01:05

## 2018-05-29 RX ADMIN — FLUTICASONE PROPIONATE AND SALMETEROL 1 PUFF: 50; 250 POWDER RESPIRATORY (INHALATION) at 11:05

## 2018-05-29 RX ADMIN — PROMETHAZINE HYDROCHLORIDE 12.5 MG: 25 INJECTION INTRAMUSCULAR; INTRAVENOUS at 07:05

## 2018-05-29 RX ADMIN — ARIPIPRAZOLE 2 MG: 2 TABLET ORAL at 09:05

## 2018-05-29 RX ADMIN — DIPHENHYDRAMINE HYDROCHLORIDE 50 MG: 50 INJECTION, SOLUTION INTRAMUSCULAR; INTRAVENOUS at 07:05

## 2018-05-29 RX ADMIN — PANCRELIPASE 6 CAPSULE: 24000; 76000; 120000 CAPSULE, DELAYED RELEASE PELLETS ORAL at 01:05

## 2018-05-29 RX ADMIN — INSULIN ASPART 3 UNITS: 100 INJECTION, SOLUTION INTRAVENOUS; SUBCUTANEOUS at 11:05

## 2018-05-29 RX ADMIN — TOPIRAMATE 25 MG: 25 TABLET, FILM COATED ORAL at 09:05

## 2018-05-29 RX ADMIN — RIBAVIRIN 400 MG: 200 CAPSULE ORAL at 01:05

## 2018-05-29 NOTE — NURSING
Pt departed to OR for Bronch via stretcher in stable condition. Report given. Pt on 3 L of oxygen.

## 2018-05-29 NOTE — ASSESSMENT & PLAN NOTE
Previously admitted earlier this week at outside hospital for RLL PNA.  Procalcitonin negative.  Cultures with NGTD.  PA/Lat CXR done here shows no evidence of pneumonia.      Continue supplemental oxygen and empiric Vanc/Cefepime/Ribavirin. Patient on day 3/3 of solumedrol 500mg.   ENT evaluated for recurrent sinusitis and history of acute mastoiditis. Plan for possible PE tube placement per otologist.    Bronchoscopy 5/29/18 without complications to rule out rejection - results pending.  High suspicion for progression of CARLOS EDUARDO.    RVP and respiratory culture pending.

## 2018-05-29 NOTE — PLAN OF CARE
Called to bedside for substernal chest pain that started tonight - HR has been elevated between 110-140. Patient states that she has had this issue in the past during her last hospitalization. Troponin (-). EKG does not show any ST-T changes. Patient feels that this happens when she starts steroids. Will bolus her 1L LR over 2 hours - she states her fluid intake has been low and that IV fluids helped her with her tachycardia previously.    Flory Mejias MD  [facility] PCCM Fellow

## 2018-05-29 NOTE — PROGRESS NOTES
"[facility]  Endocrinology  Progress Note    Admit Date: 2018     Reason for Consult: Management of steroid induced Hyperglycemia     Surgical Procedure and Date: lung transplant     Diabetes diagnosis year: reports PCP told her she had a "borderline GTT"     Home Diabetes Medications:  toujeo 3 units daily     How often checking glucose at home? Does not check   BG readings on regimen:   Hypoglycemia on the regimen?  denies s/s hypoglycemia  Missed doses on regimen?  No    Diabetes Complications include:     none    Complicating diabetes co morbidities:   Glucocorticoid use       HPI:   Patient is a 28 y.o. female with a diagnosis of CF s/p lung transplant, CF pancreatic insufficiency, admitted for SOB, cough and congestion after returning from a cruise with concern for infection. Endo consulted for BG management. Pt reports compliance with toujeo as rx by her PCP for "borderline abnormal GTT" but she does not check BG and reports she does not have a diagnosis of DM. Denies FH DM. Currently taking PDN 5mg daily.           Interval HPI:   Overnight events: Completed solumedrol 500 IV qD x 3 days today.   Eatin%  Nausea: No  Hypoglycemia and intervention: No  Fever: No  TPN and/or TF: No    BP (!) 148/86 (Patient Position: Lying)   Pulse 88   Temp 97.9 °F (36.6 °C) (Oral)   Resp 18   Ht 5' (1.524 m)   Wt 58 kg (127 lb 13.9 oz)   LMP 10/02/2016   SpO2 96%   Breastfeeding? No   BMI 24.97 kg/m²       Labs Reviewed and Include      Recent Labs  Lab 18  0531   *   CALCIUM 8.4*   ALBUMIN 3.2*   PROT 6.1      K 4.1   CO2 23   *   BUN 20   CREATININE 1.2   ALKPHOS 108   ALT 14   AST 8*   BILITOT 0.4     Lab Results   Component Value Date    WBC 12.28 2018    HGB 7.3 (L) 2018    HCT 25.9 (L) 2018    MCV 95 2018     (L) 2018     No results for input(s): TSH, FREET4 in the last 168 hours.  Lab Results   Component Value " Date    HGBA1C 4.7 05/26/2018       Nutritional status:   Body mass index is 24.97 kg/m².  Lab Results   Component Value Date    ALBUMIN 3.2 (L) 05/29/2018    ALBUMIN 3.6 05/28/2018    ALBUMIN 3.4 (L) 05/27/2018     Lab Results   Component Value Date    PREALBUMIN 15 (L) 05/29/2014    PREALBUMIN 11 (L) 05/09/2014    PREALBUMIN 18 (L) 03/19/2014       Estimated Creatinine Clearance: 55.1 mL/min (based on SCr of 1.2 mg/dL).    Accu-Checks  Recent Labs      05/27/18   1824  05/27/18   1826  05/27/18   1929  05/27/18   2322  05/28/18   0158  05/28/18   0805  05/28/18   1426  05/28/18   2009  05/28/18   2339  05/29/18   0201   POCTGLUCOSE  471*  500*  >500*  429*  424*  125*  470*  371*  393*  268*       Current Medications and/or Treatments Impacting Glycemic Control  Immunotherapy:  Immunosuppressants         Stop Route Frequency     mycophenolate capsule 500 mg      -- Oral 2 times daily     tacrolimus capsule 0.5 mg      -- Oral 2 times daily     tacrolimus capsule 3 mg      -- Oral 2 times daily        Steroids:   Hormones     Start     Stop Route Frequency Ordered    05/27/18 1315  methylPREDNISolone sodium succinate (SOLU-MEDROL) 500 mg in dextrose 5 % 100 mL IVPB      05/30 0859 IV Daily 05/27/18 1200        Pressors:    Autonomic Drugs     None        Hyperglycemia/Diabetes Medications: Antihyperglycemics     Start     Stop Route Frequency Ordered    05/28/18 1645  insulin aspart U-100 pen 10 Units      -- SubQ 3 times daily with meals 05/28/18 1509    05/27/18 1938  insulin aspart U-100 pen 1-10 Units      -- SubQ Before meals & nightly PRN 05/27/18 1839          ASSESSMENT and PLAN    * Pneumonia    On abx  Infection may increase insulin resistance.         Current chronic use of systemic steroids    May increase insulin needs; prandial excursion noted.   Solumedrol 500 mg x3 first dose 5/27/18, completed today.             Lung transplant status, bilateral    Per LUT        Immunosuppression     Immunosuppressants may increase insulin resistance.         Hyperglycemia    History suggestive of steroid induced hyperglycemia and/or CFRDM  BG goal 140-180    Continue novolog 10 units with meals.   Continue bg monitoring ac/hs and moderate dose correction scale.     Patient is snacking; discussed limiting drinks/food high in sugar or eating with meals rather than in between. Patient aware that these foods especially will increase her bg while on steroids.     C-peptide WNL 4.40,  A1c 4.7.              John Colorado MD  Endocrinology  [facility]

## 2018-05-29 NOTE — ASSESSMENT & PLAN NOTE
Current outpatient regimen: tacrolimus 3.5 mg BID, prednisone 5 mg.  Not on MMF due to leukopenia in the past.    Will continue with current immunosuppression. Monitor daily tacrolimus levels and dose adjust accordingly. Tac level 5.7 today likely due to AM dose being held prior to bronchoscopy.

## 2018-05-29 NOTE — PHYSICIAN QUERY
PT Name: Juanita Ibarra  MR #: [id number]    Physician Query Form - Atrial Flutter Specificity Clarification     CDS/: Krupa Fish               Contact information: [email or web address]   This form is a permanent document in the medical record.     Query Date: May 29, 2018    By submitting this query, we are merely seeking further clarification of documentation. Please utilize your independent clinical judgment when addressing the question(s) below.    The medical record contains the following:   Indicators     Supporting Clinical Findings Location in Medical Record   X Atrial Flutter  EKG with a-flutter, no acute process and troponin negative Transplant PN 5/29    EKG results      Medication     X Treatment 1L LR bolus administered.  Transplant PN 5/29    Other       Provider, please further specify the Atrial Flutter diagnosis.    [  ] Atypical  [  ] Type I  [  ] Type II  [  ] Typical  [ x ] Other (please specify): ________No atrial flutter___________________________  [  ] Clinically Undetermined    Please document in your progress notes daily for the duration of treatment until resolved, and include in your discharge summary.

## 2018-05-29 NOTE — ASSESSMENT & PLAN NOTE
BG stable upon admission. Endocrinology consulted, appreciate recs. Last dose of solumedrol 500 mg IV to be administered today.

## 2018-05-29 NOTE — SUBJECTIVE & OBJECTIVE
Subjective:     Interval History:   Per nursing and MD documentation, patient noted to have substernal chest pain that started overnight with associated tachycardia of 110-140. Patient reports this happens when she is receiving high dose steroids.  EKG with a-flutter, no acute process and troponin negative. 1L LR bolus administered.   Patient underwent bronchoscopy this morning without complications. Patient reports feeling much improved after the bronchoscopy this afternoon. Patient is continuing to wean down on O2 and is now 98% on 3L NC. Patient reports resolution of chest pain and denies palpitations. No other complaints at this time.   Continuous Infusions:   sodium chloride 0.9% 10 mL/hr (05/29/18 0649)     Scheduled Meds:   amLODIPine  10 mg Oral Daily    ARIPiprazole  2 mg Oral QHS    calcium-vitamin D3  1 tablet Oral BID WM    ceFEPime (MAXIPIME) IVPB  2 g Intravenous Q8H    cetirizine  5 mg Oral Daily    dapsone  100 mg Oral Daily    fluticasone  2 spray Each Nare Daily    fluticasone-salmeterol 250-50 mcg/dose  1 puff Inhalation BID    folic acid  1,000 mcg Oral Daily    gabapentin  1,100 mg Oral TID    insulin aspart U-100  10 Units Subcutaneous TIDWM    lipase-protease-amylase 24,000-76,000-120,000 units  6 capsule Oral TIDWM    lisinopril  10 mg Oral Daily    magnesium oxide  400 mg Oral BID    methylPREDNISolone (SOLU-Medrol) IVPB (doses > 250 mg)   Intravenous Daily    mirtazapine  30 mg Oral QHS    montelukast  10 mg Oral Daily    morphine  15 mg Oral TID    multivit-min-FA-coenzyme Q10 100-5 mcg-mg  1 tablet Oral BID    mycophenolate  500 mg Oral BID    polyethylene glycol  17 g Oral BID    ribavirin  400 mg Oral TID    tacrolimus  0.5 mg Oral BID    tacrolimus  3 mg Oral BID    topiramate  25 mg Oral BID    vancomycin (VANCOCIN) IVPB  750 mg Intravenous Q12H     PRN Meds:acetaminophen, albuterol, butalbital-acetaminophen-caffeine -40 mg,  butalbital-acetaminophen-caffeine -40 mg, dextrose 50%, dextrose 50%, diphenhydrAMINE, glucagon (human recombinant), glucose, glucose, heparin, porcine (PF), insulin aspart U-100, lidocaine-prilocaine, lipase-protease-amylase 24,000-76,000-120,000 units, LORazepam, magnesium sulfate IVPB **AND** magnesium sulfate IVPB, ondansetron, oxyCODONE, potassium chloride 10% **AND** potassium chloride 10% **AND** potassium chloride 10%, promethazine, tiZANidine    Review of patient's allergies indicates:   Allergen Reactions    Albuterol Palpitations    Colistin Anaphylaxis    Vancomycin analogues      Infusion reaction that does not resolve with slowing    Neupogen [filgrastim] Other (See Comments)     Ostealgia after five daily doses of 300 mcg.      Bactrim [sulfamethoxazole-trimethoprim] Hives    Ceftazidime Hives     Pt stated can tolerate cefapine not ceftazidime    Ceftazidime     Dronabinol Other (See Comments)     Mental changes/hallucinations    Haldol [haloperidol lactate] Other (See Comments)     Seizure like activity    Nsaids (non-steroidal anti-inflammatory drug)      Cannot have due to lung transplant    Adhesive Rash     Cloth tape- please use tegaderm or paper tape    Aztreonam Rash    Ciprofloxacin Nausea And Vomiting     Projectile N/V, per patient.  Unwilling to retry therapy.       Review of Systems   Constitutional: Positive for activity change and fatigue. Negative for chills and fever.   HENT: Positive for congestion, ear pain and rhinorrhea. Negative for sinus pressure and sore throat.    Eyes: Negative.    Respiratory: Positive for cough and shortness of breath.    Cardiovascular: Negative for palpitations and leg swelling.   Gastrointestinal: Positive for nausea (baseline). Negative for abdominal pain, diarrhea and vomiting.   Endocrine: Negative.    Genitourinary: Negative for dysuria, frequency and urgency.   Musculoskeletal: Positive for back pain (chronic). Negative for  myalgias.   Skin: Negative for color change, pallor and rash.   Allergic/Immunologic: Positive for immunocompromised state.   Neurological: Negative for dizziness, weakness and headaches.   Hematological: Negative.    Psychiatric/Behavioral: Negative for agitation and behavioral problems.     Objective:   Physical Exam   Constitutional: She is oriented to person, place, and time. She appears well-developed and well-nourished. No distress.   HENT:   Head: Normocephalic and atraumatic.   Eyes: Conjunctivae and EOM are normal.   Neck: Normal range of motion. Neck supple.   Cardiovascular: Normal rate, regular rhythm, normal heart sounds and intact distal pulses.  Exam reveals no gallop and no friction rub.    No murmur heard.  Pulmonary/Chest: Effort normal. No respiratory distress. She has no wheezes. She has no rales.   Neurological: She is alert and oriented to person, place, and time.   Skin: Skin is warm and dry. Capillary refill takes less than 2 seconds. No rash noted. No erythema. No pallor.   Psychiatric: She has a normal mood and affect. Her behavior is normal. Judgment and thought content normal.   Nursing note and vitals reviewed.        Vital Signs (Most Recent):  Temp: 97.9 °F (36.6 °C) (05/29/18 1159)  Pulse: 88 (05/29/18 1159)  Resp: 18 (05/29/18 1159)  BP: (!) 148/86 (05/29/18 1159)  SpO2: 96 % (05/29/18 1159) Vital Signs (24h Range):  Temp:  [97.5 °F (36.4 °C)-98.3 °F (36.8 °C)] 97.9 °F (36.6 °C)  Pulse:  [] 88  Resp:  [16-30] 18  SpO2:  [91 %-99 %] 96 %  BP: (123-148)/(65-99) 148/86     Weight: 58 kg (127 lb 13.9 oz)  Body mass index is 24.97 kg/m².      Intake/Output Summary (Last 24 hours) at 05/29/18 1218  Last data filed at 05/29/18 0817   Gross per 24 hour   Intake             2280 ml   Output                0 ml   Net             2280 ml       Significant Labs:  CBC:    Recent Labs  Lab 05/29/18  0531   WBC 12.28   RBC 2.74*   HGB 7.3*   HCT 25.9*   *   MCV 95   MCH 26.6*   MCHC  28.2*     BMP:    Recent Labs  Lab 05/29/18  0531      K 4.1   *   CO2 23   BUN 20   CREATININE 1.2   CALCIUM 8.4*      Tacrolimus Levels:    Recent Labs  Lab 05/29/18  0531   TACROLIMUS 5.7     Microbiology:  Microbiology Results (last 7 days)     Procedure Component Value Units Date/Time    Culture, Respiratory  - Cystic Fibrosis [[id number]] Collected:  05/27/18 1834    Order Status:  Completed Specimen:  Respiratory from Sputum, Expectorated Updated:  05/29/18 1051     RESPIRATORY CULTURE - CYSTIC FIBROSIS Normal respiratory anibal     Gram Stain (Respiratory) <10 epithelial cells per low power field.     Gram Stain (Respiratory) Many WBC's     Gram Stain (Respiratory) No organisms seen    Culture, Respiratory [[id number]] Collected:  05/29/18 0759    Order Status:  Completed Specimen:  Respiratory from BAL, RML Updated:  05/29/18 0948     Gram Stain (Respiratory) <10 epithelial cells per low power field.     Gram Stain (Respiratory) No WBC's     Gram Stain (Respiratory) No organisms seen    Narrative:       Bronchial Wash    AFB Culture & Smear [[id number]] Collected:  05/29/18 0759    Order Status:  Sent Specimen:  Respiratory from BAL, RML Updated:  05/29/18 0811    Fungus culture [[id number]] Collected:  05/29/18 0759    Order Status:  Sent Specimen:  Respiratory from BAL, RML Updated:  05/29/18 0810    Culture, Respiratory  - Cystic Fibrosis [[id number]] Collected:  05/28/18 2253    Order Status:  Completed Specimen:  Respiratory from Sputum Updated:  05/29/18 0241     Gram Stain (Respiratory) <10 epithelial cells per low power field.     Gram Stain (Respiratory) No WBC's     Gram Stain (Respiratory) No organisms seen    Narrative:       CULTURE FROM NASAL CAVITY BILATERALLY    Blood culture [[id number]] Collected:  05/25/18 1643    Order Status:  Completed Specimen:  Blood from Peripheral, Hand, Right Updated:  05/28/18 2212     Blood Culture, Routine No Growth to date     Blood Culture, Routine No  Growth to date     Blood Culture, Routine No Growth to date     Blood Culture, Routine No Growth to date    Respiratory Viral Panel by PCR [facility]; Nasal Swab [[id number]] Collected:  05/26/18 1159    Order Status:  Sent Specimen:  Respiratory Updated:  05/26/18 1222          I have reviewed all pertinent labs within the past 24 hours.    Diagnostic Results:  none further

## 2018-05-29 NOTE — ASSESSMENT & PLAN NOTE
May increase insulin needs; prandial excursion noted.   Solumedrol 500 mg x3 first dose 5/27/18, completed today.

## 2018-05-29 NOTE — PLAN OF CARE
Problem: Patient Care Overview  Goal: Plan of Care Review  Outcome: Ongoing (interventions implemented as appropriate)  Pt went for a bronchoscopy today.  Pt came back to floor AAOx4.  Pt vital signs remained stable throughout shift. NADN. Call light in reach. Pt instructed to call with needs.  Pt remained free from falls and injuries throughout shift.

## 2018-05-29 NOTE — PROGRESS NOTES
12-lead EKG showed atrial flutter. Notified Dr. Mejias who stated she would come see pt.    Addendum 2150: Troponin resulted WDL. Orders received to give 1 L Lactated Ringer's over 2 hours due to continued tachycardia. Infusing now.    Results for LIZA WILLIAM (MRN [id number]) as of 5/28/2018 23:16   Ref. Range 5/28/2018 20:58   Troponin I Latest Ref Range: 0.000 - 0.026 ng/mL 0.008

## 2018-05-29 NOTE — SUBJECTIVE & OBJECTIVE
Interval HPI:   Overnight events: Completed solumedrol 500 IV qD x 3 days today.   Eatin%  Nausea: No  Hypoglycemia and intervention: No  Fever: No  TPN and/or TF: No    BP (!) 148/86 (Patient Position: Lying)   Pulse 88   Temp 97.9 °F (36.6 °C) (Oral)   Resp 18   Ht 5' (1.524 m)   Wt 58 kg (127 lb 13.9 oz)   LMP 10/02/2016   SpO2 96%   Breastfeeding? No   BMI 24.97 kg/m²     Labs Reviewed and Include      Recent Labs  Lab 18  0531   *   CALCIUM 8.4*   ALBUMIN 3.2*   PROT 6.1      K 4.1   CO2 23   *   BUN 20   CREATININE 1.2   ALKPHOS 108   ALT 14   AST 8*   BILITOT 0.4     Lab Results   Component Value Date    WBC 12.28 2018    HGB 7.3 (L) 2018    HCT 25.9 (L) 2018    MCV 95 2018     (L) 2018     No results for input(s): TSH, FREET4 in the last 168 hours.  Lab Results   Component Value Date    HGBA1C 4.7 2018       Nutritional status:   Body mass index is 24.97 kg/m².  Lab Results   Component Value Date    ALBUMIN 3.2 (L) 2018    ALBUMIN 3.6 2018    ALBUMIN 3.4 (L) 2018     Lab Results   Component Value Date    PREALBUMIN 15 (L) 2014    PREALBUMIN 11 (L) 2014    PREALBUMIN 18 (L) 2014       Estimated Creatinine Clearance: 55.1 mL/min (based on SCr of 1.2 mg/dL).    Accu-Checks  Recent Labs      18   1824  18   1826  18   1929  18   2322  18   0158  18   0805  18   1426  18   2009  18   2339  18   0201   POCTGLUCOSE  471*  500*  >500*  429*  424*  125*  470*  371*  393*  268*       Current Medications and/or Treatments Impacting Glycemic Control  Immunotherapy:  Immunosuppressants         Stop Route Frequency     mycophenolate capsule 500 mg      -- Oral 2 times daily     tacrolimus capsule 0.5 mg      -- Oral 2 times daily     tacrolimus capsule 3 mg      -- Oral 2 times daily        Steroids:   Hormones     Start     Stop Route  Frequency Ordered    05/27/18 1315  methylPREDNISolone sodium succinate (SOLU-MEDROL) 500 mg in dextrose 5 % 100 mL IVPB      05/30 0859 IV Daily 05/27/18 1200        Pressors:    Autonomic Drugs     None        Hyperglycemia/Diabetes Medications: Antihyperglycemics     Start     Stop Route Frequency Ordered    05/28/18 1645  insulin aspart U-100 pen 10 Units      -- SubQ 3 times daily with meals 05/28/18 1509    05/27/18 1938  insulin aspart U-100 pen 1-10 Units      -- SubQ Before meals & nightly PRN 05/27/18 4737

## 2018-05-29 NOTE — TRANSFER OF CARE
Anesthesia Transfer of Care Note    Patient: Juanita Ibarra    Procedure(s) Performed: Procedure(s) (LRB):  BRONCHOSCOPY; Bronchoscopy with BAL and transbronchial biopsies under general anesthesia (N/A)    Patient location: PACU    Anesthesia Type: general    Transport from OR: Transported from OR on 6-10 L/min O2 by face mask with adequate spontaneous ventilation    Post pain: adequate analgesia    Post assessment: no apparent anesthetic complications and tolerated procedure well    Post vital signs: stable    Level of consciousness: sedated    Nausea/Vomiting: no nausea/vomiting    Complications: none    Transfer of care protocol was followed      Last vitals:   Visit Vitals  /78 (BP Location: Left arm, Patient Position: Lying)   Pulse 104   Temp 36.8 °C (98.3 °F) (Oral)   Resp (!) 22   Ht 5' (1.524 m)   Wt 58 kg (127 lb 13.9 oz)   LMP 10/02/2016   SpO2 (S) 98%   Breastfeeding? No   BMI 24.97 kg/m²

## 2018-05-29 NOTE — NURSING TRANSFER
Nursing Transfer Note      5/29/2018     Transfer To: [id number]    Transfer via stretcher    Transfer with NC    Transported by rn    Medicines sent: none    Chart send with patient: Yes    Notified: spouse

## 2018-05-29 NOTE — ANESTHESIA POSTPROCEDURE EVALUATION
Anesthesia Post Evaluation    Patient: Juanita Ibarra    Procedure(s) Performed: Procedure(s) (LRB):  BRONCHOSCOPY; Bronchoscopy with BAL and transbronchial biopsies under general anesthesia (N/A)    Final Anesthesia Type: general  Patient location during evaluation: PACU  Patient participation: Yes- Able to Participate  Level of consciousness: awake and alert and oriented  Post-procedure vital signs: reviewed and stable  Pain management: adequate  Airway patency: patent  PONV status at discharge: No PONV  Anesthetic complications: no      Cardiovascular status: stable  Respiratory status: unassisted  Hydration status: euvolemic  Follow-up not needed.        Visit Vitals  BP (!) 136/92   Pulse 97   Temp 36.6 °C (97.9 °F) (Axillary)   Resp 20   Ht 5' (1.524 m)   Wt 58 kg (127 lb 13.9 oz)   LMP 10/02/2016   SpO2 98%   Breastfeeding? No   BMI 24.97 kg/m²       Pain/Richard Score: Pain Assessment Performed: Yes (5/29/2018  9:55 AM)  Presence of Pain: denies (5/29/2018  9:55 AM)  Pain Rating Prior to Med Admin: 8 (5/29/2018  5:11 AM)  Pain Rating Post Med Admin: 3 (5/29/2018  6:11 AM)  Richard Score: 8 (5/29/2018  9:55 AM)

## 2018-05-29 NOTE — ASSESSMENT & PLAN NOTE
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  She is admitted from [place] as a transfer for a possible right sided pneumonia.  Aspergillus, Fungitell, and CMV pending.  Procalcitonin <0.02.  Bronchoscopy from 3/2018 with negative cultures, Aspergillus, CMV, and Respiratory Pathogens Panel.      Will continue empiric coverage for PNA with Vanc/Cefepime. Complete day 3 of 3 of solumedrol 500 mg IV and complete Ribavirin 400mg po TID for 10 days to empirically cover respiratory viruses.  Continue supplemental oxygen as prescribed.  Nasal swab for RVP pending.    High concerns for progression of her CARLOS EDUARDO.  Bronchoscopy today for biopsies to rule out rejection-results pending.

## 2018-05-29 NOTE — PROGRESS NOTES
[facility]  Lung Transplant  Progress Note - Floor    Patient Name: Junaita Ibarra  MRN: [id number]  Admission Date: 5/25/2018  Hospital Length of Stay: 4 days  Post-Operative Day: 1447  Attending Physician: Jake Alvarez MD  Primary Care Provider: Jake Alvarez MD     Subjective:     Interval History:   Per nursing and MD documentation, patient noted to have substernal chest pain that started overnight with associated tachycardia of 110-140. Patient reports this happens when she is receiving high dose steroids.  EKG with a-flutter, no acute process and troponin negative. 1L LR bolus administered.   Patient underwent bronchoscopy this morning without complications. Patient reports feeling much improved after the bronchoscopy this afternoon. Patient is continuing to wean down on O2 and is now 98% on 3L NC. Patient reports resolution of chest pain and denies palpitations. No other complaints at this time.   Continuous Infusions:   sodium chloride 0.9% 10 mL/hr (05/29/18 0649)     Scheduled Meds:   amLODIPine  10 mg Oral Daily    ARIPiprazole  2 mg Oral QHS    calcium-vitamin D3  1 tablet Oral BID WM    ceFEPime (MAXIPIME) IVPB  2 g Intravenous Q8H    cetirizine  5 mg Oral Daily    dapsone  100 mg Oral Daily    fluticasone  2 spray Each Nare Daily    fluticasone-salmeterol 250-50 mcg/dose  1 puff Inhalation BID    folic acid  1,000 mcg Oral Daily    gabapentin  1,100 mg Oral TID    insulin aspart U-100  10 Units Subcutaneous TIDWM    lipase-protease-amylase 24,000-76,000-120,000 units  6 capsule Oral TIDWM    lisinopril  10 mg Oral Daily    magnesium oxide  400 mg Oral BID    methylPREDNISolone (SOLU-Medrol) IVPB (doses > 250 mg)   Intravenous Daily    mirtazapine  30 mg Oral QHS    montelukast  10 mg Oral Daily    morphine  15 mg Oral TID    multivit-min-FA-coenzyme Q10 100-5 mcg-mg  1 tablet Oral BID    mycophenolate  500 mg Oral BID    polyethylene glycol   17 g Oral BID    ribavirin  400 mg Oral TID    tacrolimus  0.5 mg Oral BID    tacrolimus  3 mg Oral BID    topiramate  25 mg Oral BID    vancomycin (VANCOCIN) IVPB  750 mg Intravenous Q12H     PRN Meds:acetaminophen, albuterol, butalbital-acetaminophen-caffeine -40 mg, butalbital-acetaminophen-caffeine -40 mg, dextrose 50%, dextrose 50%, diphenhydrAMINE, glucagon (human recombinant), glucose, glucose, heparin, porcine (PF), insulin aspart U-100, lidocaine-prilocaine, lipase-protease-amylase 24,000-76,000-120,000 units, LORazepam, magnesium sulfate IVPB **AND** magnesium sulfate IVPB, ondansetron, oxyCODONE, potassium chloride 10% **AND** potassium chloride 10% **AND** potassium chloride 10%, promethazine, tiZANidine    Review of patient's allergies indicates:   Allergen Reactions    Albuterol Palpitations    Colistin Anaphylaxis    Vancomycin analogues      Infusion reaction that does not resolve with slowing    Neupogen [filgrastim] Other (See Comments)     Ostealgia after five daily doses of 300 mcg.      Bactrim [sulfamethoxazole-trimethoprim] Hives    Ceftazidime Hives     Pt stated can tolerate cefapine not ceftazidime    Ceftazidime     Dronabinol Other (See Comments)     Mental changes/hallucinations    Haldol [haloperidol lactate] Other (See Comments)     Seizure like activity    Nsaids (non-steroidal anti-inflammatory drug)      Cannot have due to lung transplant    Adhesive Rash     Cloth tape- please use tegaderm or paper tape    Aztreonam Rash    Ciprofloxacin Nausea And Vomiting     Projectile N/V, per patient.  Unwilling to retry therapy.       Review of Systems   Constitutional: Positive for activity change and fatigue. Negative for chills and fever.   HENT: Positive for congestion, ear pain and rhinorrhea. Negative for sinus pressure and sore throat.    Eyes: Negative.    Respiratory: Positive for cough and shortness of breath.    Cardiovascular: Negative for  palpitations and leg swelling.   Gastrointestinal: Positive for nausea (baseline). Negative for abdominal pain, diarrhea and vomiting.   Endocrine: Negative.    Genitourinary: Negative for dysuria, frequency and urgency.   Musculoskeletal: Positive for back pain (chronic). Negative for myalgias.   Skin: Negative for color change, pallor and rash.   Allergic/Immunologic: Positive for immunocompromised state.   Neurological: Negative for dizziness, weakness and headaches.   Hematological: Negative.    Psychiatric/Behavioral: Negative for agitation and behavioral problems.     Objective:   Physical Exam   Constitutional: She is oriented to person, place, and time. She appears well-developed and well-nourished. No distress.   HENT:   Head: Normocephalic and atraumatic.   Eyes: Conjunctivae and EOM are normal.   Neck: Normal range of motion. Neck supple.   Cardiovascular: Normal rate, regular rhythm, normal heart sounds and intact distal pulses.  Exam reveals no gallop and no friction rub.    No murmur heard.  Pulmonary/Chest: Effort normal. No respiratory distress. She has no wheezes. She has no rales.   Neurological: She is alert and oriented to person, place, and time.   Skin: Skin is warm and dry. Capillary refill takes less than 2 seconds. No rash noted. No erythema. No pallor.   Psychiatric: She has a normal mood and affect. Her behavior is normal. Judgment and thought content normal.   Nursing note and vitals reviewed.        Vital Signs (Most Recent):  Temp: 97.9 °F (36.6 °C) (05/29/18 1159)  Pulse: 88 (05/29/18 1159)  Resp: 18 (05/29/18 1159)  BP: (!) 148/86 (05/29/18 1159)  SpO2: 96 % (05/29/18 1159) Vital Signs (24h Range):  Temp:  [97.5 °F (36.4 °C)-98.3 °F (36.8 °C)] 97.9 °F (36.6 °C)  Pulse:  [] 88  Resp:  [16-30] 18  SpO2:  [91 %-99 %] 96 %  BP: (123-148)/(65-99) 148/86     Weight: 58 kg (127 lb 13.9 oz)  Body mass index is 24.97 kg/m².      Intake/Output Summary (Last 24 hours) at 05/29/18  1218  Last data filed at 05/29/18 0817   Gross per 24 hour   Intake             2280 ml   Output                0 ml   Net             2280 ml       Significant Labs:  CBC:    Recent Labs  Lab 05/29/18  0531   WBC 12.28   RBC 2.74*   HGB 7.3*   HCT 25.9*   *   MCV 95   MCH 26.6*   MCHC 28.2*     BMP:    Recent Labs  Lab 05/29/18  0531      K 4.1   *   CO2 23   BUN 20   CREATININE 1.2   CALCIUM 8.4*      Tacrolimus Levels:    Recent Labs  Lab 05/29/18  0531   TACROLIMUS 5.7     Microbiology:  Microbiology Results (last 7 days)     Procedure Component Value Units Date/Time    Culture, Respiratory  - Cystic Fibrosis [[id number]] Collected:  05/27/18 1834    Order Status:  Completed Specimen:  Respiratory from Sputum, Expectorated Updated:  05/29/18 1051     RESPIRATORY CULTURE - CYSTIC FIBROSIS Normal respiratory anibal     Gram Stain (Respiratory) <10 epithelial cells per low power field.     Gram Stain (Respiratory) Many WBC's     Gram Stain (Respiratory) No organisms seen    Culture, Respiratory [[id number]] Collected:  05/29/18 0759    Order Status:  Completed Specimen:  Respiratory from BAL, L Updated:  05/29/18 0948     Gram Stain (Respiratory) <10 epithelial cells per low power field.     Gram Stain (Respiratory) No WBC's     Gram Stain (Respiratory) No organisms seen    Narrative:       Bronchial Wash    AFB Culture & Smear [[id number]] Collected:  05/29/18 0759    Order Status:  Sent Specimen:  Respiratory from BAL, L Updated:  05/29/18 0811    Fungus culture [[id number]] Collected:  05/29/18 0759    Order Status:  Sent Specimen:  Respiratory from BAL, L Updated:  05/29/18 0810    Culture, Respiratory  - Cystic Fibrosis [[id number]] Collected:  05/28/18 2253    Order Status:  Completed Specimen:  Respiratory from Sputum Updated:  05/29/18 0241     Gram Stain (Respiratory) <10 epithelial cells per low power field.     Gram Stain (Respiratory) No WBC's     Gram Stain (Respiratory) No organisms  seen    Narrative:       CULTURE FROM NASAL CAVITY BILATERALLY    Blood culture [[id number]] Collected:  05/25/18 1643    Order Status:  Completed Specimen:  Blood from Peripheral, Hand, Right Updated:  05/28/18 2212     Blood Culture, Routine No Growth to date     Blood Culture, Routine No Growth to date     Blood Culture, Routine No Growth to date     Blood Culture, Routine No Growth to date    Respiratory Viral Panel by PCR [facility]; Nasal Swab [[id number]] Collected:  05/26/18 1159    Order Status:  Sent Specimen:  Respiratory Updated:  05/26/18 1222          I have reviewed all pertinent labs within the past 24 hours.    Diagnostic Results:  none further      Assessment/Plan:     * Pneumonia    Previously admitted earlier this week at outside hospital for RLL PNA.  Procalcitonin negative.  Cultures with NGTD.  PA/Lat CXR done here shows no evidence of pneumonia.      Continue supplemental oxygen and empiric Vanc/Cefepime/Ribavirin. Patient on day 3/3 of solumedrol 500mg.   ENT evaluated for recurrent sinusitis and history of acute mastoiditis. Plan for possible PE tube placement per otologist.    Bronchoscopy 5/29/18 without complications to rule out rejection - results pending.  High suspicion for progression of CARLOS EDUARDO.    RVP and respiratory culture pending.         Lung transplant status, bilateral    BLT (CMV D+/R-) secondary to CF. 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  She is admitted from [place] as a transfer for a possible right sided pneumonia.  Aspergillus, Fungitell, and CMV pending.  Procalcitonin <0.02.  Bronchoscopy from 3/2018 with negative cultures, Aspergillus, CMV, and Respiratory Pathogens Panel.      Will continue empiric coverage for PNA with Vanc/Cefepime. Complete day 3 of 3 of solumedrol 500 mg IV and complete Ribavirin 400mg po TID for 10 days to empirically cover respiratory  viruses.  Continue supplemental oxygen as prescribed.  Nasal swab for RVP pending.    High concerns for progression of her CARLOS EDUARDO.  Bronchoscopy today for biopsies to rule out rejection-results pending.         Immunosuppression    Current outpatient regimen: tacrolimus 3.5 mg BID, prednisone 5 mg.  Not on MMF due to leukopenia in the past.    Will continue with current immunosuppression. Monitor daily tacrolimus levels and dose adjust accordingly. Tac level 5.7 today likely due to AM dose being held prior to bronchoscopy.           Prophylactic antibiotic    Current outpatient regimen: dapsone 100 mg daily.     Continue dapsone for PCP prophylaxis.  Reports severe allergy to Bactrim.         Nausea    Baseline nausea controlled with PRN IV Phenergan and PRN Benadryl.         CF related Pancreatic insufficiency    Continue current outpatient regimen of Creon.         Chronic pain with opiate use    Will continue current outpatient regimen per patient's pain management physician and PCP.         Sinusitis, chronic    History of acute mastoiditis in April 2017. Follows with Dr. Olsen ENT.     Continue with cetirizine and montelukast.   RVP from nasal swab pending.    ENT consulted and appreciate recs.  Has a history of recurrent Pseudomonas PNA and continued sinusitis.  Plan for possible PE tube placement per otology.         Hyperglycemia    BG stable upon admission. Endocrinology consulted, appreciate recs. Last dose of solumedrol 500 mg IV to be administered today.            Jacqueline Dumont PA-C  Lung Transplant  [facility]

## 2018-05-29 NOTE — ASSESSMENT & PLAN NOTE
History suggestive of steroid induced hyperglycemia and/or CFRDM  BG goal 140-180    Continue novolog 10 units with meals.   Continue bg monitoring ac/hs and moderate dose correction scale.     Patient is snacking; discussed limiting drinks/food high in sugar or eating with meals rather than in between. Patient aware that these foods especially will increase her bg while on steroids.     C-peptide WNL 4.40,  A1c 4.7.

## 2018-05-29 NOTE — PROGRESS NOTES
"- Pt called RN into room stating that she felt as if her heart was "beating out of my chest." She also stated she was experiencing substernal chest pain rated "7" to "8" out of 10.  - Vital signs below.  - Notified Dr. Mejias who ordered STAT 12-lead EKG and troponin I.  - Troponin drawn; notified EKG technician.     05/28/18 2048   Vital Signs   Pulse (!) 119   Heart Rate Source Monitor   Resp 20   SpO2 97 %   Flow (L/min) 3   O2 Device (Oxygen Therapy) nasal cannula   /67   MAP (mmHg) 90   BP Location Left arm   BP Method Automatic   Patient Position Sitting     "

## 2018-05-30 PROBLEM — H69.93 ETD (EUSTACHIAN TUBE DYSFUNCTION), BILATERAL: Status: ACTIVE | Noted: 2017-04-16

## 2018-05-30 PROBLEM — M26.629 TMJ ARTHRALGIA: Status: ACTIVE | Noted: 2018-05-30

## 2018-05-30 LAB
1,3 BETA GLUCAN SPEC-MCNC: 82 PG/ML
ALBUMIN SERPL BCP-MCNC: 3 G/DL
ALP SERPL-CCNC: 105 U/L
ALT SERPL W/O P-5'-P-CCNC: 14 U/L
ANION GAP SERPL CALC-SCNC: 5 MMOL/L
AST SERPL-CCNC: 9 U/L
BACTERIA BLD CULT: NORMAL
BASOPHILS # BLD AUTO: 0.01 K/UL
BASOPHILS NFR BLD: 0.1 %
BILIRUB SERPL-MCNC: 0.3 MG/DL
BUN SERPL-MCNC: 19 MG/DL
CALCIUM SERPL-MCNC: 8.2 MG/DL
CHLORIDE SERPL-SCNC: 109 MMOL/L
CMV DNA SPEC QL NAA+PROBE: NOT DETECTED
CO2 SERPL-SCNC: 26 MMOL/L
CREAT SERPL-MCNC: 1 MG/DL
DIFFERENTIAL METHOD: ABNORMAL
EOSINOPHIL # BLD AUTO: 0 K/UL
EOSINOPHIL NFR BLD: 0 %
ERYTHROCYTE [DISTWIDTH] IN BLOOD BY AUTOMATED COUNT: 15.9 %
EST. GFR  (AFRICAN AMERICAN): >60 ML/MIN/1.73 M^2
EST. GFR  (NON AFRICAN AMERICAN): >60 ML/MIN/1.73 M^2
GALACTOMANNAN AG SPEC-ACNC: <0.5 INDEX
GLUCOSE SERPL-MCNC: 297 MG/DL
HCT VFR BLD AUTO: 26.1 %
HGB BLD-MCNC: 7.5 G/DL
IMM GRANULOCYTES # BLD AUTO: 0.11 K/UL
IMM GRANULOCYTES NFR BLD AUTO: 1 %
L PNEUMO AG UR QL IA: NOT DETECTED
LYMPHOCYTES # BLD AUTO: 0.3 K/UL
LYMPHOCYTES NFR BLD: 3.1 %
MAGNESIUM SERPL-MCNC: 1.7 MG/DL
MCH RBC QN AUTO: 27 PG
MCHC RBC AUTO-ENTMCNC: 28.7 G/DL
MCV RBC AUTO: 94 FL
MONOCYTES # BLD AUTO: 0.2 K/UL
MONOCYTES NFR BLD: 1.4 %
NEUTROPHILS # BLD AUTO: 10.4 K/UL
NEUTROPHILS NFR BLD: 94.4 %
NRBC BLD-RTO: 0 /100 WBC
PLATELET # BLD AUTO: 118 K/UL
PMV BLD AUTO: 11.1 FL
POCT GLUCOSE: 174 MG/DL (ref 70–110)
POCT GLUCOSE: 208 MG/DL (ref 70–110)
POCT GLUCOSE: 267 MG/DL (ref 70–110)
POCT GLUCOSE: 298 MG/DL (ref 70–110)
POCT GLUCOSE: 391 MG/DL (ref 70–110)
POTASSIUM SERPL-SCNC: 4.3 MMOL/L
PROT SERPL-MCNC: 5.8 G/DL
RBC # BLD AUTO: 2.78 M/UL
SODIUM SERPL-SCNC: 140 MMOL/L
SPECIMEN SOURCE: NORMAL
TACROLIMUS BLD-MCNC: 5.9 NG/ML
VANCOMYCIN SERPL-MCNC: 14.6 UG/ML
WBC # BLD AUTO: 11.04 K/UL

## 2018-05-30 PROCEDURE — 25000003 PHARM REV CODE 250: Performed by: INTERNAL MEDICINE

## 2018-05-30 PROCEDURE — 63600175 PHARM REV CODE 636 W HCPCS: Performed by: INTERNAL MEDICINE

## 2018-05-30 PROCEDURE — 25000003 PHARM REV CODE 250: Performed by: PHYSICIAN ASSISTANT

## 2018-05-30 PROCEDURE — 80202 ASSAY OF VANCOMYCIN: CPT

## 2018-05-30 PROCEDURE — 25000003 PHARM REV CODE 250: Performed by: STUDENT IN AN ORGANIZED HEALTH CARE EDUCATION/TRAINING PROGRAM

## 2018-05-30 PROCEDURE — 80053 COMPREHEN METABOLIC PANEL: CPT

## 2018-05-30 PROCEDURE — 20600001 HC STEP DOWN PRIVATE ROOM

## 2018-05-30 PROCEDURE — 80197 ASSAY OF TACROLIMUS: CPT

## 2018-05-30 PROCEDURE — 63600175 PHARM REV CODE 636 W HCPCS: Performed by: PHYSICIAN ASSISTANT

## 2018-05-30 PROCEDURE — 99232 SBSQ HOSP IP/OBS MODERATE 35: CPT | Mod: ,,, | Performed by: INTERNAL MEDICINE

## 2018-05-30 PROCEDURE — 83735 ASSAY OF MAGNESIUM: CPT

## 2018-05-30 PROCEDURE — 099680Z DRAINAGE OF LEFT MIDDLE EAR WITH DRAINAGE DEVICE, VIA NATURAL OR ARTIFICIAL OPENING ENDOSCOPIC: ICD-10-PCS | Performed by: OTOLARYNGOLOGY

## 2018-05-30 PROCEDURE — 99232 SBSQ HOSP IP/OBS MODERATE 35: CPT | Mod: 25,,, | Performed by: OTOLARYNGOLOGY

## 2018-05-30 PROCEDURE — 69433 CREATE EARDRUM OPENING: CPT | Mod: 50,,, | Performed by: OTOLARYNGOLOGY

## 2018-05-30 PROCEDURE — 99233 SBSQ HOSP IP/OBS HIGH 50: CPT | Mod: ,,, | Performed by: INTERNAL MEDICINE

## 2018-05-30 PROCEDURE — 85025 COMPLETE CBC W/AUTO DIFF WBC: CPT

## 2018-05-30 PROCEDURE — 099580Z DRAINAGE OF RIGHT MIDDLE EAR WITH DRAINAGE DEVICE, VIA NATURAL OR ARTIFICIAL OPENING ENDOSCOPIC: ICD-10-PCS | Performed by: OTOLARYNGOLOGY

## 2018-05-30 RX ORDER — INSULIN ASPART 100 [IU]/ML
5 INJECTION, SOLUTION INTRAVENOUS; SUBCUTANEOUS
Status: DISCONTINUED | OUTPATIENT
Start: 2018-05-30 | End: 2018-05-31 | Stop reason: HOSPADM

## 2018-05-30 RX ORDER — PREDNISONE 5 MG/1
5 TABLET ORAL DAILY
Status: DISCONTINUED | OUTPATIENT
Start: 2018-05-30 | End: 2018-05-31 | Stop reason: HOSPADM

## 2018-05-30 RX ORDER — FUROSEMIDE 10 MG/ML
20 INJECTION INTRAMUSCULAR; INTRAVENOUS ONCE
Status: COMPLETED | OUTPATIENT
Start: 2018-05-30 | End: 2018-05-30

## 2018-05-30 RX ADMIN — OYSTER SHELL CALCIUM WITH VITAMIN D 1 TABLET: 500; 200 TABLET, FILM COATED ORAL at 06:05

## 2018-05-30 RX ADMIN — INSULIN ASPART 3 UNITS: 100 INJECTION, SOLUTION INTRAVENOUS; SUBCUTANEOUS at 03:05

## 2018-05-30 RX ADMIN — INSULIN ASPART 5 UNITS: 100 INJECTION, SOLUTION INTRAVENOUS; SUBCUTANEOUS at 09:05

## 2018-05-30 RX ADMIN — VANCOMYCIN HYDROCHLORIDE 750 MG: 10 INJECTION, POWDER, LYOPHILIZED, FOR SOLUTION INTRAVENOUS at 02:05

## 2018-05-30 RX ADMIN — PROMETHAZINE HYDROCHLORIDE 25 MG: 25 TABLET ORAL at 09:05

## 2018-05-30 RX ADMIN — DIPHENHYDRAMINE HYDROCHLORIDE 50 MG: 50 INJECTION, SOLUTION INTRAMUSCULAR; INTRAVENOUS at 08:05

## 2018-05-30 RX ADMIN — ALBUTEROL SULFATE 1 PUFF: 90 AEROSOL, METERED RESPIRATORY (INHALATION) at 09:05

## 2018-05-30 RX ADMIN — OXYCODONE HYDROCHLORIDE 10 MG: 5 TABLET ORAL at 10:05

## 2018-05-30 RX ADMIN — FOLIC ACID 1000 MCG: 1 TABLET ORAL at 09:05

## 2018-05-30 RX ADMIN — RIBAVIRIN 400 MG: 200 CAPSULE ORAL at 09:05

## 2018-05-30 RX ADMIN — MYCOPHENOLATE MOFETIL 500 MG: 250 CAPSULE ORAL at 09:05

## 2018-05-30 RX ADMIN — FUROSEMIDE 20 MG: 10 INJECTION, SOLUTION INTRAMUSCULAR; INTRAVENOUS at 03:05

## 2018-05-30 RX ADMIN — DIPHENHYDRAMINE HYDROCHLORIDE 50 MG: 50 INJECTION, SOLUTION INTRAMUSCULAR; INTRAVENOUS at 01:05

## 2018-05-30 RX ADMIN — PANCRELIPASE 6 CAPSULE: 24000; 76000; 120000 CAPSULE, DELAYED RELEASE PELLETS ORAL at 06:05

## 2018-05-30 RX ADMIN — MIRTAZAPINE 30 MG: 15 TABLET, FILM COATED ORAL at 09:05

## 2018-05-30 RX ADMIN — OYSTER SHELL CALCIUM WITH VITAMIN D 1 TABLET: 500; 200 TABLET, FILM COATED ORAL at 09:05

## 2018-05-30 RX ADMIN — FLUTICASONE PROPIONATE AND SALMETEROL 1 PUFF: 50; 250 POWDER RESPIRATORY (INHALATION) at 09:05

## 2018-05-30 RX ADMIN — CEFEPIME 2 G: 2 INJECTION, POWDER, FOR SOLUTION INTRAVENOUS at 02:05

## 2018-05-30 RX ADMIN — CEFEPIME 2 G: 2 INJECTION, POWDER, FOR SOLUTION INTRAVENOUS at 04:05

## 2018-05-30 RX ADMIN — RIBAVIRIN 400 MG: 200 CAPSULE ORAL at 02:05

## 2018-05-30 RX ADMIN — PREDNISONE 5 MG: 5 TABLET ORAL at 03:05

## 2018-05-30 RX ADMIN — PANCRELIPASE 6 CAPSULE: 24000; 76000; 120000 CAPSULE, DELAYED RELEASE PELLETS ORAL at 09:05

## 2018-05-30 RX ADMIN — Medication 400 MG: at 09:05

## 2018-05-30 RX ADMIN — PANCRELIPASE 6 CAPSULE: 24000; 76000; 120000 CAPSULE, DELAYED RELEASE PELLETS ORAL at 01:05

## 2018-05-30 RX ADMIN — CEFEPIME 2 G: 2 INJECTION, POWDER, FOR SOLUTION INTRAVENOUS at 09:05

## 2018-05-30 RX ADMIN — TACROLIMUS 3 MG: 1 CAPSULE ORAL at 06:05

## 2018-05-30 RX ADMIN — GABAPENTIN 1100 MG: 400 CAPSULE ORAL at 09:05

## 2018-05-30 RX ADMIN — INSULIN ASPART 4 UNITS: 100 INJECTION, SOLUTION INTRAVENOUS; SUBCUTANEOUS at 09:05

## 2018-05-30 RX ADMIN — GABAPENTIN 1100 MG: 400 CAPSULE ORAL at 02:05

## 2018-05-30 RX ADMIN — MORPHINE SULFATE 15 MG: 15 TABLET, EXTENDED RELEASE ORAL at 09:05

## 2018-05-30 RX ADMIN — ARIPIPRAZOLE 2 MG: 2 TABLET ORAL at 09:05

## 2018-05-30 RX ADMIN — INSULIN ASPART 5 UNITS: 100 INJECTION, SOLUTION INTRAVENOUS; SUBCUTANEOUS at 06:05

## 2018-05-30 RX ADMIN — TACROLIMUS 0.5 MG: 0.5 CAPSULE ORAL at 09:05

## 2018-05-30 RX ADMIN — TACROLIMUS 3 MG: 1 CAPSULE ORAL at 09:05

## 2018-05-30 RX ADMIN — INSULIN ASPART 2 UNITS: 100 INJECTION, SOLUTION INTRAVENOUS; SUBCUTANEOUS at 01:05

## 2018-05-30 RX ADMIN — DAPSONE 100 MG: 100 TABLET ORAL at 09:05

## 2018-05-30 RX ADMIN — MONTELUKAST SODIUM 10 MG: 10 TABLET, FILM COATED ORAL at 09:05

## 2018-05-30 RX ADMIN — ACETAMINOPHEN 1000 MG: 500 TABLET, FILM COATED ORAL at 07:05

## 2018-05-30 RX ADMIN — DIPHENHYDRAMINE HYDROCHLORIDE 50 MG: 50 INJECTION, SOLUTION INTRAMUSCULAR; INTRAVENOUS at 07:05

## 2018-05-30 RX ADMIN — OXYCODONE HYDROCHLORIDE 10 MG: 5 TABLET ORAL at 06:05

## 2018-05-30 RX ADMIN — FLUTICASONE PROPIONATE 100 MCG: 50 SPRAY, METERED NASAL at 09:05

## 2018-05-30 RX ADMIN — OXYCODONE HYDROCHLORIDE 10 MG: 5 TABLET ORAL at 02:05

## 2018-05-30 RX ADMIN — BUTALBITAL, ACETAMINOPHEN AND CAFFEINE 1 TABLET: 50; 325; 40 TABLET ORAL at 04:05

## 2018-05-30 RX ADMIN — LORAZEPAM 2 MG: 1 TABLET ORAL at 12:05

## 2018-05-30 RX ADMIN — TACROLIMUS 0.5 MG: 0.5 CAPSULE ORAL at 06:05

## 2018-05-30 RX ADMIN — OXYCODONE HYDROCHLORIDE 10 MG: 5 TABLET ORAL at 12:05

## 2018-05-30 RX ADMIN — TOPIRAMATE 25 MG: 25 TABLET, FILM COATED ORAL at 09:05

## 2018-05-30 RX ADMIN — SODIUM CHLORIDE, SODIUM LACTATE, POTASSIUM CHLORIDE, AND CALCIUM CHLORIDE 500 ML: .6; .31; .03; .02 INJECTION, SOLUTION INTRAVENOUS at 10:05

## 2018-05-30 RX ADMIN — LORAZEPAM 2 MG: 1 TABLET ORAL at 06:05

## 2018-05-30 RX ADMIN — AMLODIPINE BESYLATE 10 MG: 10 TABLET ORAL at 09:05

## 2018-05-30 RX ADMIN — OXYCODONE HYDROCHLORIDE 10 MG: 5 TABLET ORAL at 04:05

## 2018-05-30 RX ADMIN — LISINOPRIL 10 MG: 2.5 TABLET ORAL at 09:05

## 2018-05-30 RX ADMIN — INSULIN ASPART 5 UNITS: 100 INJECTION, SOLUTION INTRAVENOUS; SUBCUTANEOUS at 01:05

## 2018-05-30 RX ADMIN — BUTALBITAL, ACETAMINOPHEN AND CAFFEINE 1 TABLET: 50; 325; 40 TABLET ORAL at 09:05

## 2018-05-30 RX ADMIN — TIZANIDINE 6 MG: 2 TABLET ORAL at 03:05

## 2018-05-30 RX ADMIN — MORPHINE SULFATE 15 MG: 15 TABLET, EXTENDED RELEASE ORAL at 02:05

## 2018-05-30 NOTE — PROGRESS NOTES
[facility]  Lung Transplant  Progress Note - Floor    Patient Name: Juanita Ibarra  MRN: [id number]  Admission Date: 5/25/2018  Hospital Length of Stay: 5 days  Post-Operative Day: 1448  Attending Physician: Jake Alvarez MD  Primary Care Provider: Jake Alvarez MD     Subjective:     Interval History:   Patient sleeping comfortably this AM. Patient underwent PE tube placement during morning ENT appointment. Patient continues to feel improved with good appetite. Currently 95% on 3L NC. Patient reports feeling slightly fluid overloaded. One time dose of lasix 20 administered. No other complaints at this time. Dispo pending results of infectious workup from bronchoscopy and cultures.     Continuous Infusions:   sodium chloride 0.9% 10 mL/hr (05/29/18 0649)     Scheduled Meds:   amLODIPine  10 mg Oral Daily    ARIPiprazole  2 mg Oral QHS    calcium-vitamin D3  1 tablet Oral BID WM    ceFEPime (MAXIPIME) IVPB  2 g Intravenous Q8H    cetirizine  5 mg Oral Daily    dapsone  100 mg Oral Daily    fluticasone  2 spray Each Nare Daily    fluticasone-salmeterol 250-50 mcg/dose  1 puff Inhalation BID    folic acid  1,000 mcg Oral Daily    gabapentin  1,100 mg Oral TID    insulin aspart U-100  5 Units Subcutaneous TIDWM    lipase-protease-amylase 24,000-76,000-120,000 units  6 capsule Oral TIDWM    lisinopril  10 mg Oral Daily    magnesium oxide  400 mg Oral BID    mirtazapine  30 mg Oral QHS    montelukast  10 mg Oral Daily    morphine  15 mg Oral TID    multivit-min-FA-coenzyme Q10 100-5 mcg-mg  1 tablet Oral BID    mycophenolate  500 mg Oral BID    polyethylene glycol  17 g Oral BID    ribavirin  400 mg Oral TID    tacrolimus  0.5 mg Oral BID    tacrolimus  3 mg Oral BID    topiramate  25 mg Oral BID    vancomycin (VANCOCIN) IVPB  750 mg Intravenous Q12H     PRN Meds:acetaminophen, albuterol, butalbital-acetaminophen-caffeine -40 mg, dextrose 50%, dextrose  50%, diphenhydrAMINE, glucagon (human recombinant), glucose, glucose, heparin, porcine (PF), insulin aspart U-100, lidocaine-prilocaine, lipase-protease-amylase 24,000-76,000-120,000 units, LORazepam, magnesium sulfate IVPB **AND** magnesium sulfate IVPB, ondansetron, oxyCODONE, potassium chloride 10% **AND** potassium chloride 10% **AND** potassium chloride 10%, promethazine, tiZANidine    Review of patient's allergies indicates:   Allergen Reactions    Albuterol Palpitations    Colistin Anaphylaxis    Vancomycin analogues      Infusion reaction that does not resolve with slowing    Neupogen [filgrastim] Other (See Comments)     Ostealgia after five daily doses of 300 mcg.      Bactrim [sulfamethoxazole-trimethoprim] Hives    Ceftazidime Hives     Pt stated can tolerate cefapine not ceftazidime    Ceftazidime     Dronabinol Other (See Comments)     Mental changes/hallucinations    Haldol [haloperidol lactate] Other (See Comments)     Seizure like activity    Nsaids (non-steroidal anti-inflammatory drug)      Cannot have due to lung transplant    Adhesive Rash     Cloth tape- please use tegaderm or paper tape    Aztreonam Rash    Ciprofloxacin Nausea And Vomiting     Projectile N/V, per patient.  Unwilling to retry therapy.       Review of Systems   Constitutional: Positive for activity change and fatigue. Negative for chills and fever.   HENT: Positive for congestion, ear pain and rhinorrhea. Negative for sinus pressure and sore throat.    Eyes: Negative.    Respiratory: Positive for cough and shortness of breath.    Cardiovascular: Negative for palpitations and leg swelling.   Gastrointestinal: Positive for nausea (baseline). Negative for abdominal pain, diarrhea and vomiting.   Endocrine: Negative.    Genitourinary: Negative for dysuria, frequency and urgency.   Musculoskeletal: Positive for back pain (chronic). Negative for myalgias.   Skin: Negative for color change, pallor and rash.    Allergic/Immunologic: Positive for immunocompromised state.   Neurological: Negative for dizziness, weakness and headaches.   Hematological: Negative.    Psychiatric/Behavioral: Negative for agitation and behavioral problems.     Objective:   Physical Exam   Constitutional: She is oriented to person, place, and time. She appears well-developed and well-nourished. No distress.   HENT:   Head: Normocephalic and atraumatic.   Eyes: Conjunctivae and EOM are normal.   Neck: Normal range of motion. Neck supple.   Cardiovascular: Normal rate, regular rhythm, normal heart sounds and intact distal pulses.  Exam reveals no gallop and no friction rub.    No murmur heard.  Pulmonary/Chest: Effort normal. No respiratory distress. She has no wheezes. She has no rales.   Neurological: She is alert and oriented to person, place, and time.   Skin: Skin is warm and dry. Capillary refill takes less than 2 seconds. No rash noted. No erythema. No pallor.   Psychiatric: She has a normal mood and affect. Her behavior is normal. Judgment and thought content normal.   Nursing note and vitals reviewed.        Vital Signs (Most Recent):  Temp: 98 °F (36.7 °C) (05/30/18 1334)  Pulse: 91 (05/30/18 1334)  Resp: 18 (05/30/18 1334)  BP: 130/79 (05/30/18 1334)  SpO2: 95 % (05/30/18 1334) Vital Signs (24h Range):  Temp:  [97.5 °F (36.4 °C)-99.1 °F (37.3 °C)] 98 °F (36.7 °C)  Pulse:  [86-95] 91  Resp:  [18-20] 18  SpO2:  [95 %-99 %] 95 %  BP: (123-139)/(70-86) 130/79     Weight: 58.2 kg (128 lb 4.9 oz)  Body mass index is 25.06 kg/m².      Intake/Output Summary (Last 24 hours) at 05/30/18 1412  Last data filed at 05/30/18 0400   Gross per 24 hour   Intake             1040 ml   Output                0 ml   Net             1040 ml       Significant Labs:  CBC:    Recent Labs  Lab 05/30/18  0400   WBC 11.04   RBC 2.78*   HGB 7.5*   HCT 26.1*   *   MCV 94   MCH 27.0   MCHC 28.7*     BMP:    Recent Labs  Lab 05/30/18  0400      K 4.3   CL  109   CO2 26   BUN 19   CREATININE 1.0   CALCIUM 8.2*      Tacrolimus Levels:    Recent Labs  Lab 05/30/18  0400   TACROLIMUS 5.9     Microbiology:  Microbiology Results (last 7 days)     Procedure Component Value Units Date/Time    AFB Culture & Smear [[id number]] Collected:  05/29/18 0759    Order Status:  Completed Specimen:  Respiratory from BAL, RML Updated:  05/30/18 1333     AFB Culture & Smear Culture in progress     AFB CULTURE STAIN No acid fast bacilli seen.    Narrative:       Bronchial Wash    Fungus culture [[id number]] Collected:  05/29/18 0759    Order Status:  Completed Specimen:  Respiratory from BAL, RML Updated:  05/30/18 1027     Fungus (Mycology) Culture Culture in progress    Narrative:       Bronchial Wash    Culture, Respiratory  - Cystic Fibrosis [[id number]] Collected:  05/27/18 1834    Order Status:  Completed Specimen:  Respiratory from Sputum, Expectorated Updated:  05/30/18 0901     RESPIRATORY CULTURE - CYSTIC FIBROSIS Normal respiratory anibal     Gram Stain (Respiratory) <10 epithelial cells per low power field.     Gram Stain (Respiratory) Many WBC's     Gram Stain (Respiratory) No organisms seen    Culture, Respiratory  - Cystic Fibrosis [[id number]] Collected:  05/28/18 2253    Order Status:  Completed Specimen:  Respiratory from Sputum Updated:  05/30/18 0753     RESPIRATORY CULTURE - CYSTIC FIBROSIS No Growth     Gram Stain (Respiratory) <10 epithelial cells per low power field.     Gram Stain (Respiratory) No WBC's     Gram Stain (Respiratory) No organisms seen    Narrative:       CULTURE FROM NASAL CAVITY BILATERALLY    Culture, Respiratory [[id number]] Collected:  05/29/18 0759    Order Status:  Completed Specimen:  Respiratory from BAL, RML Updated:  05/30/18 0749     Respiratory Culture No Growth     Gram Stain (Respiratory) <10 epithelial cells per low power field.     Gram Stain (Respiratory) No WBC's     Gram Stain (Respiratory) No organisms seen    Narrative:       Bronchial  Wash    Blood culture [[id number]] Collected:  05/25/18 1643    Order Status:  Completed Specimen:  Blood from Peripheral, Hand, Right Updated:  05/29/18 2212     Blood Culture, Routine No Growth to date     Blood Culture, Routine No Growth to date     Blood Culture, Routine No Growth to date     Blood Culture, Routine No Growth to date     Blood Culture, Routine No Growth to date    Respiratory Viral Panel by PCR [facility]; Nasal Swab [[id number]] Collected:  05/26/18 1159    Order Status:  Sent Specimen:  Respiratory Updated:  05/26/18 1222          I have reviewed all pertinent labs within the past 24 hours.    Diagnostic Results:  none further      Assessment/Plan:     * Pneumonia    Previously admitted earlier this week at outside hospital for RLL PNA.  Procalcitonin negative.  Cultures with NGTD.  PA/Lat CXR done here shows no evidence of pneumonia.      Continue supplemental oxygen and empiric Vanc/Cefepime/Ribavirin. Patient completed day 3/3 of solumedrol 500mg yesterday.   ENT evaluated for recurrent sinusitis and history of acute mastoiditis. PE tube placement per ENT today during appointment.     Bronchoscopy 5/29/18 without complications to rule out rejection - results pending.  High suspicion for progression of CARLOS EDUARDO.    RVP and respiratory culture pending. Dispo pending infectious workup results.         Lung transplant status, bilateral    BLT (CMV D+/R-) secondary to CF. 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  She is admitted from [place] as a transfer for a possible right sided pneumonia.  Aspergillus, Fungitell, and CMV pending.  Procalcitonin <0.02.  Bronchoscopy from 3/2018 with negative cultures, Aspergillus, CMV, and Respiratory Pathogens Panel.      Will continue empiric coverage for PNA with Vanc/Cefepime. Complete day 3 of 3 of solumedrol 500 mg IV and complete Ribavirin 400mg po  TID for 10 days to empirically cover respiratory viruses.  Continue supplemental oxygen as prescribed.  Nasal swab for RVP pending.    High concerns for progression of her CARLOS EDUARDO.  Bronchoscopy today for biopsies to rule out rejection-results pending.         Immunosuppression    Current outpatient regimen: tacrolimus 3.5 mg BID, prednisone 5 mg.  Not on MMF due to leukopenia in the past.    Will continue with current immunosuppression. Monitor daily tacrolimus levels and dose adjust accordingly. Tac level 5.9 today.        Prophylactic antibiotic    Current outpatient regimen: dapsone 100 mg daily.     Continue dapsone for PCP prophylaxis.  Reports severe allergy to Bactrim.         Nausea    Baseline nausea controlled with PRN IV Phenergan and PRN Benadryl.         CF related Pancreatic insufficiency    Continue current outpatient regimen of Creon.         Chronic pain with opiate use    Will continue current outpatient regimen per patient's pain management physician and PCP.         Sinusitis, chronic    History of acute mastoiditis in April 2017. Follows with Dr. Olsen ENT.     Continue with cetirizine and montelukast.   RVP from nasal swab pending.    ENT consulted and appreciate recs.  Has a history of recurrent Pseudomonas PNA and continued sinusitis.  PE tube placement today per ENT.         Hyperglycemia    BG stable upon admission. Endocrinology consulted, appreciate recs. Resume home dose of prednisone today.             Jacqueline Dumont PA-C  Lung Transplant  [facility]

## 2018-05-30 NOTE — PROGRESS NOTES
[facility]  Otorhinolaryngology-Head & Neck Surgery  Progress Note    Subjective:     Post-Op Info:  Procedure(s) (LRB):  BRONCHOSCOPY; Bronchoscopy with BAL and transbronchial biopsies under general anesthesia (N/A)   1 Day Post-Op  Hospital Day: 6     Interval History: NAEON. Complains of ear fullness and stuffiness. Requesting tubes to be placed.     Medications:  Continuous Infusions:   sodium chloride 0.9% 10 mL/hr (05/29/18 0649)     Scheduled Meds:   amLODIPine  10 mg Oral Daily    ARIPiprazole  2 mg Oral QHS    calcium-vitamin D3  1 tablet Oral BID WM    ceFEPime (MAXIPIME) IVPB  2 g Intravenous Q8H    cetirizine  5 mg Oral Daily    dapsone  100 mg Oral Daily    fluticasone  2 spray Each Nare Daily    fluticasone-salmeterol 250-50 mcg/dose  1 puff Inhalation BID    folic acid  1,000 mcg Oral Daily    gabapentin  1,100 mg Oral TID    insulin aspart U-100  5 Units Subcutaneous TIDWM    lipase-protease-amylase 24,000-76,000-120,000 units  6 capsule Oral TIDWM    lisinopril  10 mg Oral Daily    magnesium oxide  400 mg Oral BID    mirtazapine  30 mg Oral QHS    montelukast  10 mg Oral Daily    morphine  15 mg Oral TID    multivit-min-FA-coenzyme Q10 100-5 mcg-mg  1 tablet Oral BID    mycophenolate  500 mg Oral BID    polyethylene glycol  17 g Oral BID    ribavirin  400 mg Oral TID    tacrolimus  0.5 mg Oral BID    tacrolimus  3 mg Oral BID    topiramate  25 mg Oral BID    vancomycin (VANCOCIN) IVPB  750 mg Intravenous Q12H     PRN Meds:acetaminophen, albuterol, butalbital-acetaminophen-caffeine -40 mg, dextrose 50%, dextrose 50%, diphenhydrAMINE, glucagon (human recombinant), glucose, glucose, heparin, porcine (PF), insulin aspart U-100, lidocaine-prilocaine, lipase-protease-amylase 24,000-76,000-120,000 units, LORazepam, magnesium sulfate IVPB **AND** magnesium sulfate IVPB, ondansetron, oxyCODONE, potassium chloride 10% **AND** potassium chloride 10% **AND**  potassium chloride 10%, promethazine, tiZANidine     Review of patient's allergies indicates:   Allergen Reactions    Albuterol Palpitations    Colistin Anaphylaxis    Vancomycin analogues      Infusion reaction that does not resolve with slowing    Neupogen [filgrastim] Other (See Comments)     Ostealgia after five daily doses of 300 mcg.      Bactrim [sulfamethoxazole-trimethoprim] Hives    Ceftazidime Hives     Pt stated can tolerate cefapine not ceftazidime    Ceftazidime     Dronabinol Other (See Comments)     Mental changes/hallucinations    Haldol [haloperidol lactate] Other (See Comments)     Seizure like activity    Nsaids (non-steroidal anti-inflammatory drug)      Cannot have due to lung transplant    Adhesive Rash     Cloth tape- please use tegaderm or paper tape    Aztreonam Rash    Ciprofloxacin Nausea And Vomiting     Projectile N/V, per patient.  Unwilling to retry therapy.     Objective:     Vital Signs (24h Range):  Temp:  [97.5 °F (36.4 °C)-99.1 °F (37.3 °C)] 98 °F (36.7 °C)  Pulse:  [86-95] 88  Resp:  [18-20] 18  SpO2:  [95 %-99 %] 96 %  BP: (123-148)/(70-86) 139/86        Lines/Drains/Airways     Central Venous Catheter Line                 Port A Cath Single Lumen right subclavian -- days                Physical Exam   Constitutional: She is oriented to person, place, and time. She appears well-developed and well-nourished.   HENT:   Right Ear: Tympanic membrane and ear canal normal. Tympanic membrane is not scarred, not perforated and not retracted. No middle ear effusion. No decreased hearing is noted.   Left Ear: Tympanic membrane and ear canal normal. Tympanic membrane is not scarred, not perforated and not retracted.  No middle ear effusion. No decreased hearing is noted.   Eyes: EOM are normal. Pupils are equal, round, and reactive to light.   Neck: Normal range of motion. Neck supple.   Cardiovascular: Normal rate.    Pulmonary/Chest: Effort normal. No stridor. No  respiratory distress.   Abdominal: Soft.   Musculoskeletal: Normal range of motion. She exhibits no edema.   Lymphadenopathy:     She has no cervical adenopathy.   Neurological: She is oriented to person, place, and time. No cranial nerve deficit.   Skin: Skin is warm and dry. Capillary refill takes less than 2 seconds.   Psychiatric: She has a normal mood and affect. Her behavior is normal.       Ears fully able to be insufflated by patient on exam  Left otalgia c/w TMJ    Significant Labs:  CBC:   Recent Labs  Lab 05/30/18  0400   WBC 11.04   RBC 2.78*   HGB 7.5*   HCT 26.1*   *   MCV 94   MCH 27.0   MCHC 28.7*     CMP:   Recent Labs  Lab 05/30/18  0400   *   CALCIUM 8.2*   ALBUMIN 3.0*   PROT 5.8*      K 4.3   CO2 26      BUN 19   CREATININE 1.0   ALKPHOS 105   ALT 14   AST 9*   BILITOT 0.3       Significant Diagnostics:  None    Patient brought back to the minor procedure room. Bilateral TMs were locally anesthetized with phenol. Stab incisions were placed in the ant inf quadrant. Jama tubes were placed bilaterally. There were no complications. Patient tolerated the procedure well       After informed consent regarding infection, perforation, early extrusion and possible cholesteatoma formation the patient was brought to the minor procedure room and placed in the supine position. The operating microscope was then brought onto the field and the tympanic membrane was visualized. Using a sterile, single use phenol kit the TM was sterilized and anesthetized. A myringotomy incision was made and the effusion evacuated. A sterile Small V tympanostomy tube was placed and the procedure was terminated. The patient tolerated the procedure well.  This was done bilaterally.    Water precautions discussed. RTC as directed.    Assessment/Plan:     TMJ arthralgia    - NSAIDs  - Warm compresses  - Soft diet        ETD (Eustachian tube dysfunction), bilateral    Tubes placed at request of  patient  - Otalgia 2/2 to TMJ        Sinusitis, chronic    - Culture taken  - History of pseudomonal sinus infections  - Do not believe patient needs revision sinus surgery at this point  - Agree with current abx regimen  - Discussed with staff Dr. Chaka Philippe MD  Otorhinolaryngology-Head & Neck Surgery  [facility]

## 2018-05-30 NOTE — ASSESSMENT & PLAN NOTE
History suggestive of steroid induced hyperglycemia and/or CFRDM  BG goal 140-180    Decreased novolog from 10 units with meals to 5 units with meals.   Continue bg monitoring ac/hs and moderate dose correction scale.     Glucose remains elevated; however, it is down-trending following completion of solumedrol. Hyperglycemia from solumedrol will likely last ~36 hours following last dose.     C-peptide WNL 4.40,  A1c 4.7.

## 2018-05-30 NOTE — SUBJECTIVE & OBJECTIVE
Interval HPI:   Overnight events: Patient reporting ear pain and fullness, seen by ENT today, tympanostomy tubes placed at patient's request.  Eatin%  Nausea: No  Hypoglycemia and intervention: No  Fever: No  TPN and/or TF: No    /86 (Patient Position: Lying)   Pulse 88   Temp 98 °F (36.7 °C) (Oral)   Resp 18   Ht 5' (1.524 m)   Wt 58.2 kg (128 lb 4.9 oz)   LMP 10/02/2016   SpO2 96%   Breastfeeding? No   BMI 25.06 kg/m²     Labs Reviewed and Include      Recent Labs  Lab 18  0400   *   CALCIUM 8.2*   ALBUMIN 3.0*   PROT 5.8*      K 4.3   CO2 26      BUN 19   CREATININE 1.0   ALKPHOS 105   ALT 14   AST 9*   BILITOT 0.3     Lab Results   Component Value Date    WBC 11.04 2018    HGB 7.5 (L) 2018    HCT 26.1 (L) 2018    MCV 94 2018     (L) 2018     No results for input(s): TSH, FREET4 in the last 168 hours.  Lab Results   Component Value Date    HGBA1C 4.7 2018       Nutritional status:   Body mass index is 25.06 kg/m².  Lab Results   Component Value Date    ALBUMIN 3.0 (L) 2018    ALBUMIN 3.2 (L) 2018    ALBUMIN 3.6 2018     Lab Results   Component Value Date    PREALBUMIN 15 (L) 2014    PREALBUMIN 11 (L) 2014    PREALBUMIN 18 (L) 2014       Estimated Creatinine Clearance: 66.3 mL/min (based on SCr of 1 mg/dL).    Accu-Checks  Recent Labs      18   0805  18   1426  18   2009  18   2339  18   0201  18   1259  18   1813  18   2347  18   0332  18   0922   POCTGLUCOSE  125*  470*  371*  393*  268*  274*  168*  294*  298*  208*       Current Medications and/or Treatments Impacting Glycemic Control  Immunotherapy:  Immunosuppressants         Stop Route Frequency     mycophenolate capsule 500 mg      -- Oral 2 times daily     tacrolimus capsule 0.5 mg      -- Oral 2 times daily     tacrolimus capsule 3 mg      -- Oral 2 times daily         Steroids:   Hormones     None        Pressors:    Autonomic Drugs     None        Hyperglycemia/Diabetes Medications: Antihyperglycemics     Start     Stop Route Frequency Ordered    05/30/18 1130  insulin aspart U-100 pen 5 Units      -- SubQ 3 times daily with meals 05/30/18 1108    05/27/18 1938  insulin aspart U-100 pen 1-10 Units      -- SubQ Before meals & nightly PRN 05/27/18 8161

## 2018-05-30 NOTE — PROGRESS NOTES
[facility]  Infectious Disease  Progress Note    Patient Name: Juanita Ibarra  MRN: [id number]  Admission Date: 5/25/2018  Length of Stay: 5 days  Attending Physician: Jake Alvarez MD  Primary Care Provider: Jake Alvarez MD    Isolation Status: Contact  Assessment/Plan:      * Pneumonia    28-year-old female   - CF s/p BLT (CMV D+/R-, July 2014)   - c/b A1 rejection in August 2014, Grade 3 CARLOS EDUARDO  - History of CMV viremia  - History of Pseudomonas / MRSA sinusitis and pneumonia  - Pneumonia, improving on empiric therapy    Recommendations:  - Follow-up bronch results  - Continue empiric course of vanc / cefepime, narrow based on bronch findings                  Anticipated Disposition: clinical improvement    Thank you for your consult. I will sign off. Please contact us if you have any additional questions.    Ann Sequeira MD  Infectious Disease  [facility]    Subjective:     Principal Problem:Pneumonia    HPI: Case of 27 y/o female with  history of BLT (CMV D+/R-, July 2014) secondary to CF. 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 Patient recently admitted to outside hospital in [place] earlier this week and received empiric vancomycin and meropenem for RLL pneumonia. Patient notes that 4-5 days ago she noticed increased rhinorrhea, sinus congestion, CARROLL, and dry cough that feels congested. Patient reports O2 sats at home in high 80s upon exertion on room air. Patient notes that on arrival to outside ED her O2 sats were in low 90s. Patient reports that she continues to feel poorly and unimproved. Patient reports baseline nausea but does note increased fatigue over the last week. Patient reports going on a cruise and snorkeling in [place] and [place] earlier this month that may have exposed her to sick contacts.  Denies fevers, chills. ID consulted for antibiotic recommendations.  Interval History:   Patient underwent bronchoscopy yesterday  She reports feeling a lot better after secretions removed during bronchoscopy  Overall feels much better compared to admission, weaning down oxygen  Denied any fevers, chills    Review of Systems   Constitutional: Negative for chills, diaphoresis and fever.   HENT: Negative for rhinorrhea and sore throat.    Respiratory: Positive for shortness of breath. Negative for cough.    Cardiovascular: Negative for chest pain and leg swelling.   Gastrointestinal: Negative for abdominal pain, diarrhea, nausea and vomiting.   Genitourinary: Negative for dysuria and hematuria.   Musculoskeletal: Negative for arthralgias and myalgias.   Skin: Negative for rash.   Neurological: Negative for headaches.     Objective:     Vital Signs (Most Recent):  Temp: 98.2 °F (36.8 °C) (05/30/18 1623)  Pulse: 110 (05/30/18 1623)  Resp: 16 (05/30/18 1623)  BP: 118/83 (05/30/18 1623)  SpO2: (!) 94 % (05/30/18 1623) Vital Signs (24h Range):  Temp:  [98 °F (36.7 °C)-99.1 °F (37.3 °C)] 98.2 °F (36.8 °C)  Pulse:  [] 110  Resp:  [16-20] 16  SpO2:  [94 %-96 %] 94 %  BP: (118-139)/(70-86) 118/83     Weight: 58.2 kg (128 lb 4.9 oz)  Body mass index is 25.06 kg/m².    Estimated Creatinine Clearance: 66.3 mL/min (based on SCr of 1 mg/dL).    Physical Exam   Constitutional: She is oriented to person, place, and time. She appears well-developed and well-nourished. No distress.   HENT:   Head: Normocephalic and atraumatic.   Nasal cannula in place   Eyes: Conjunctivae and EOM are normal.   Neck: Normal range of motion. Neck supple.   Pulmonary/Chest: Effort normal. No respiratory distress.   Decreased breath sounds bilaterally   Abdominal: Soft. She exhibits no distension. There is no tenderness.   Musculoskeletal: Normal range of motion. She exhibits no edema.   Neurological: She is alert and oriented to person, place, and  time.   Skin: Skin is warm and dry. No rash noted. She is not diaphoretic. No erythema.   Psychiatric: She has a normal mood and affect. Her behavior is normal.       Significant Labs: All pertinent labs within the past 24 hours have been reviewed.    Significant Imaging: I have reviewed all pertinent imaging results/findings within the past 24 hours.

## 2018-05-30 NOTE — PROGRESS NOTES
"[facility]  Endocrinology  Progress Note    Admit Date: 2018     Reason for Consult: Management of steroid induced Hyperglycemia     Surgical Procedure and Date: lung transplant     Diabetes diagnosis year: reports PCP told her she had a "borderline GTT"     Home Diabetes Medications:  toujeo 3 units daily     How often checking glucose at home? Does not check   BG readings on regimen:   Hypoglycemia on the regimen?  denies s/s hypoglycemia  Missed doses on regimen?  No    Diabetes Complications include:     none    Complicating diabetes co morbidities:   Glucocorticoid use       HPI:   Patient is a 28 y.o. female with a diagnosis of CF s/p lung transplant, CF pancreatic insufficiency, admitted for SOB, cough and congestion after returning from a cruise with concern for infection. Endo consulted for BG management. Pt reports compliance with toujeo as rx by her PCP for "borderline abnormal GTT" but she does not check BG and reports she does not have a diagnosis of DM. Denies FH DM. Currently taking PDN 5mg daily.           Interval HPI:   Overnight events: Patient reporting ear pain and fullness, seen by ENT today, tympanostomy tubes placed at patient's request.  Eatin%  Nausea: No  Hypoglycemia and intervention: No  Fever: No  TPN and/or TF: No    /86 (Patient Position: Lying)   Pulse 88   Temp 98 °F (36.7 °C) (Oral)   Resp 18   Ht 5' (1.524 m)   Wt 58.2 kg (128 lb 4.9 oz)   LMP 10/02/2016   SpO2 96%   Breastfeeding? No   BMI 25.06 kg/m²       Labs Reviewed and Include      Recent Labs  Lab 18  0400   *   CALCIUM 8.2*   ALBUMIN 3.0*   PROT 5.8*      K 4.3   CO2 26      BUN 19   CREATININE 1.0   ALKPHOS 105   ALT 14   AST 9*   BILITOT 0.3     Lab Results   Component Value Date    WBC 11.04 2018    HGB 7.5 (L) 2018    HCT 26.1 (L) 2018    MCV 94 2018     (L) 2018     No results for input(s): TSH, FREET4 in " the last 168 hours.  Lab Results   Component Value Date    HGBA1C 4.7 05/26/2018       Nutritional status:   Body mass index is 25.06 kg/m².  Lab Results   Component Value Date    ALBUMIN 3.0 (L) 05/30/2018    ALBUMIN 3.2 (L) 05/29/2018    ALBUMIN 3.6 05/28/2018     Lab Results   Component Value Date    PREALBUMIN 15 (L) 05/29/2014    PREALBUMIN 11 (L) 05/09/2014    PREALBUMIN 18 (L) 03/19/2014       Estimated Creatinine Clearance: 66.3 mL/min (based on SCr of 1 mg/dL).    Accu-Checks  Recent Labs      05/28/18   0805  05/28/18   1426  05/28/18   2009  05/28/18   2339  05/29/18   0201  05/29/18   1259  05/29/18   1813  05/29/18   2347  05/30/18   0332  05/30/18   0922   POCTGLUCOSE  125*  470*  371*  393*  268*  274*  168*  294*  298*  208*       Current Medications and/or Treatments Impacting Glycemic Control  Immunotherapy:  Immunosuppressants         Stop Route Frequency     mycophenolate capsule 500 mg      -- Oral 2 times daily     tacrolimus capsule 0.5 mg      -- Oral 2 times daily     tacrolimus capsule 3 mg      -- Oral 2 times daily        Steroids:   Hormones     None        Pressors:    Autonomic Drugs     None        Hyperglycemia/Diabetes Medications: Antihyperglycemics     Start     Stop Route Frequency Ordered    05/30/18 1130  insulin aspart U-100 pen 5 Units      -- SubQ 3 times daily with meals 05/30/18 1108    05/27/18 1938  insulin aspart U-100 pen 1-10 Units      -- SubQ Before meals & nightly PRN 05/27/18 1839          ASSESSMENT and PLAN    * Pneumonia    On abx  Infection may increase insulin resistance.         Hyperglycemia    History suggestive of steroid induced hyperglycemia and/or CFRDM  BG goal 140-180    Decreased novolog from 10 units with meals to 5 units with meals.   Continue bg monitoring ac/hs and moderate dose correction scale.     Glucose remains elevated; however, it is down-trending following completion of solumedrol. Hyperglycemia from solumedrol will likely last ~36 hours  following last dose.     C-peptide WNL 4.40,  A1c 4.7.          Current chronic use of systemic steroids    May increase insulin needs; prandial excursion noted.   Solumedrol 500 mg x3 first dose 5/27/18, completed yesterday.             Lung transplant status, bilateral    Per LUT        Immunosuppression    Immunosuppressants may increase insulin resistance.             John Colorado MD  Endocrinology  [facility]

## 2018-05-30 NOTE — ASSESSMENT & PLAN NOTE
BG stable upon admission. Endocrinology consulted, appreciate recs. Resume home dose of prednisone today.

## 2018-05-30 NOTE — SUBJECTIVE & OBJECTIVE
Interval History: NAEON. Complains of ear fullness and stuffiness. Requesting tubes to be placed.     Medications:  Continuous Infusions:   sodium chloride 0.9% 10 mL/hr (05/29/18 0649)     Scheduled Meds:   amLODIPine  10 mg Oral Daily    ARIPiprazole  2 mg Oral QHS    calcium-vitamin D3  1 tablet Oral BID WM    ceFEPime (MAXIPIME) IVPB  2 g Intravenous Q8H    cetirizine  5 mg Oral Daily    dapsone  100 mg Oral Daily    fluticasone  2 spray Each Nare Daily    fluticasone-salmeterol 250-50 mcg/dose  1 puff Inhalation BID    folic acid  1,000 mcg Oral Daily    gabapentin  1,100 mg Oral TID    insulin aspart U-100  5 Units Subcutaneous TIDWM    lipase-protease-amylase 24,000-76,000-120,000 units  6 capsule Oral TIDWM    lisinopril  10 mg Oral Daily    magnesium oxide  400 mg Oral BID    mirtazapine  30 mg Oral QHS    montelukast  10 mg Oral Daily    morphine  15 mg Oral TID    multivit-min-FA-coenzyme Q10 100-5 mcg-mg  1 tablet Oral BID    mycophenolate  500 mg Oral BID    polyethylene glycol  17 g Oral BID    ribavirin  400 mg Oral TID    tacrolimus  0.5 mg Oral BID    tacrolimus  3 mg Oral BID    topiramate  25 mg Oral BID    vancomycin (VANCOCIN) IVPB  750 mg Intravenous Q12H     PRN Meds:acetaminophen, albuterol, butalbital-acetaminophen-caffeine -40 mg, dextrose 50%, dextrose 50%, diphenhydrAMINE, glucagon (human recombinant), glucose, glucose, heparin, porcine (PF), insulin aspart U-100, lidocaine-prilocaine, lipase-protease-amylase 24,000-76,000-120,000 units, LORazepam, magnesium sulfate IVPB **AND** magnesium sulfate IVPB, ondansetron, oxyCODONE, potassium chloride 10% **AND** potassium chloride 10% **AND** potassium chloride 10%, promethazine, tiZANidine     Review of patient's allergies indicates:   Allergen Reactions    Albuterol Palpitations    Colistin Anaphylaxis    Vancomycin analogues      Infusion reaction that does not resolve with slowing    Neupogen [filgrastim]  Other (See Comments)     Ostealgia after five daily doses of 300 mcg.      Bactrim [sulfamethoxazole-trimethoprim] Hives    Ceftazidime Hives     Pt stated can tolerate cefapine not ceftazidime    Ceftazidime     Dronabinol Other (See Comments)     Mental changes/hallucinations    Haldol [haloperidol lactate] Other (See Comments)     Seizure like activity    Nsaids (non-steroidal anti-inflammatory drug)      Cannot have due to lung transplant    Adhesive Rash     Cloth tape- please use tegaderm or paper tape    Aztreonam Rash    Ciprofloxacin Nausea And Vomiting     Projectile N/V, per patient.  Unwilling to retry therapy.     Objective:     Vital Signs (24h Range):  Temp:  [97.5 °F (36.4 °C)-99.1 °F (37.3 °C)] 98 °F (36.7 °C)  Pulse:  [86-95] 88  Resp:  [18-20] 18  SpO2:  [95 %-99 %] 96 %  BP: (123-148)/(70-86) 139/86        Lines/Drains/Airways     Central Venous Catheter Line                 Port A Cath Single Lumen right subclavian -- days                Physical Exam   Constitutional: She is oriented to person, place, and time. She appears well-developed and well-nourished.   HENT:   Right Ear: Tympanic membrane and ear canal normal. Tympanic membrane is not scarred, not perforated and not retracted. No middle ear effusion. No decreased hearing is noted.   Left Ear: Tympanic membrane and ear canal normal. Tympanic membrane is not scarred, not perforated and not retracted.  No middle ear effusion. No decreased hearing is noted.   Eyes: EOM are normal. Pupils are equal, round, and reactive to light.   Neck: Normal range of motion. Neck supple.   Cardiovascular: Normal rate.    Pulmonary/Chest: Effort normal. No stridor. No respiratory distress.   Abdominal: Soft.   Musculoskeletal: Normal range of motion. She exhibits no edema.   Lymphadenopathy:     She has no cervical adenopathy.   Neurological: She is oriented to person, place, and time. No cranial nerve deficit.   Skin: Skin is warm and dry.  Capillary refill takes less than 2 seconds.   Psychiatric: She has a normal mood and affect. Her behavior is normal.       Ears fully able to be insufflated by patient on exam  Left otalgia c/w TMJ    Significant Labs:  CBC:   Recent Labs  Lab 05/30/18  0400   WBC 11.04   RBC 2.78*   HGB 7.5*   HCT 26.1*   *   MCV 94   MCH 27.0   MCHC 28.7*     CMP:   Recent Labs  Lab 05/30/18  0400   *   CALCIUM 8.2*   ALBUMIN 3.0*   PROT 5.8*      K 4.3   CO2 26      BUN 19   CREATININE 1.0   ALKPHOS 105   ALT 14   AST 9*   BILITOT 0.3       Significant Diagnostics:  None

## 2018-05-30 NOTE — PLAN OF CARE
Problem: Patient Care Overview  Goal: Plan of Care Review  Outcome: Ongoing (interventions implemented as appropriate)  Pt AAO x 4. Pt independent. Pt went to ENT clinic for Jama tube placement bilaterally. Pt on 1-2 L NC. SpO2 92%. Pt attempted to wean off of oxygen. Pt had episode of tachycardia when on room air. Pt placed on tele. Pt sinus tach. Pt receiving IV cefepime and vanc. Pt premedicated with benadryl prior to vanc administration. Cultures and biopsy results pending from bronch on 5/29.  Pt free of falls and injury. Pt wearing nonskid socks/shoes when out of bed, bed in lowest positions, side rails up x 2, call light within reach. Mother in law at bedside.

## 2018-05-30 NOTE — ASSESSMENT & PLAN NOTE
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  She is admitted from [place] as a transfer for a possible right sided pneumonia.  Aspergillus, Fungitell, and CMV pending.  Procalcitonin <0.02.  Bronchoscopy from 3/2018 with negative cultures, Aspergillus, CMV, and Respiratory Pathogens Panel.      Will continue empiric coverage for PNA with Vanc/Cefepime. Complete day 3 of 3 of solumedrol 500 mg IV and complete Ribavirin 400mg po TID for 10 days to empirically cover respiratory viruses.  Continue supplemental oxygen as prescribed.  Nasal swab for RVP pending.    High concerns for progression of her CARLOS EDUARDO.  Bronchoscopy today for biopsies to rule out rejection-results pending.

## 2018-05-30 NOTE — ASSESSMENT & PLAN NOTE
28-year-old female   - CF s/p BLT (CMV D+/R-, July 2014)   - c/b A1 rejection in August 2014, Grade 3 CARLOS EDUARDO  - History of CMV viremia  - History of Pseudomonas / MRSA sinusitis and pneumonia  - Pneumonia, improving on empiric therapy    Recommendations:  - Follow-up bronch results  - Continue empiric course of vanc / cefepime, narrow based on bronch findings

## 2018-05-30 NOTE — SUBJECTIVE & OBJECTIVE
Interval History:   Patient underwent bronchoscopy yesterday  She reports feeling a lot better after secretions removed during bronchoscopy  Overall feels much better compared to admission, weaning down oxygen  Denied any fevers, chills    Review of Systems   Constitutional: Negative for chills, diaphoresis and fever.   HENT: Negative for rhinorrhea and sore throat.    Respiratory: Positive for shortness of breath. Negative for cough.    Cardiovascular: Negative for chest pain and leg swelling.   Gastrointestinal: Negative for abdominal pain, diarrhea, nausea and vomiting.   Genitourinary: Negative for dysuria and hematuria.   Musculoskeletal: Negative for arthralgias and myalgias.   Skin: Negative for rash.   Neurological: Negative for headaches.     Objective:     Vital Signs (Most Recent):  Temp: 98.2 °F (36.8 °C) (05/30/18 1623)  Pulse: 110 (05/30/18 1623)  Resp: 16 (05/30/18 1623)  BP: 118/83 (05/30/18 1623)  SpO2: (!) 94 % (05/30/18 1623) Vital Signs (24h Range):  Temp:  [98 °F (36.7 °C)-99.1 °F (37.3 °C)] 98.2 °F (36.8 °C)  Pulse:  [] 110  Resp:  [16-20] 16  SpO2:  [94 %-96 %] 94 %  BP: (118-139)/(70-86) 118/83     Weight: 58.2 kg (128 lb 4.9 oz)  Body mass index is 25.06 kg/m².    Estimated Creatinine Clearance: 66.3 mL/min (based on SCr of 1 mg/dL).    Physical Exam   Constitutional: She is oriented to person, place, and time. She appears well-developed and well-nourished. No distress.   HENT:   Head: Normocephalic and atraumatic.   Nasal cannula in place   Eyes: Conjunctivae and EOM are normal.   Neck: Normal range of motion. Neck supple.   Pulmonary/Chest: Effort normal. No respiratory distress.   Decreased breath sounds bilaterally   Abdominal: Soft. She exhibits no distension. There is no tenderness.   Musculoskeletal: Normal range of motion. She exhibits no edema.   Neurological: She is alert and oriented to person, place, and time.   Skin: Skin is warm and dry. No rash noted. She is not  diaphoretic. No erythema.   Psychiatric: She has a normal mood and affect. Her behavior is normal.       Significant Labs: All pertinent labs within the past 24 hours have been reviewed.    Significant Imaging: I have reviewed all pertinent imaging results/findings within the past 24 hours.

## 2018-05-30 NOTE — SUBJECTIVE & OBJECTIVE
Subjective:     Interval History:   Patient sleeping comfortably this AM. Patient underwent PE tube placement during morning ENT appointment. Patient continues to feel improved with good appetite. Currently 95% on 3L NC. Patient reports feeling slightly fluid overloaded. One time dose of lasix 20 administered. No other complaints at this time. Dispo pending results of infectious workup from bronchoscopy and cultures.     Continuous Infusions:   sodium chloride 0.9% 10 mL/hr (05/29/18 0649)     Scheduled Meds:   amLODIPine  10 mg Oral Daily    ARIPiprazole  2 mg Oral QHS    calcium-vitamin D3  1 tablet Oral BID WM    ceFEPime (MAXIPIME) IVPB  2 g Intravenous Q8H    cetirizine  5 mg Oral Daily    dapsone  100 mg Oral Daily    fluticasone  2 spray Each Nare Daily    fluticasone-salmeterol 250-50 mcg/dose  1 puff Inhalation BID    folic acid  1,000 mcg Oral Daily    gabapentin  1,100 mg Oral TID    insulin aspart U-100  5 Units Subcutaneous TIDWM    lipase-protease-amylase 24,000-76,000-120,000 units  6 capsule Oral TIDWM    lisinopril  10 mg Oral Daily    magnesium oxide  400 mg Oral BID    mirtazapine  30 mg Oral QHS    montelukast  10 mg Oral Daily    morphine  15 mg Oral TID    multivit-min-FA-coenzyme Q10 100-5 mcg-mg  1 tablet Oral BID    mycophenolate  500 mg Oral BID    polyethylene glycol  17 g Oral BID    ribavirin  400 mg Oral TID    tacrolimus  0.5 mg Oral BID    tacrolimus  3 mg Oral BID    topiramate  25 mg Oral BID    vancomycin (VANCOCIN) IVPB  750 mg Intravenous Q12H     PRN Meds:acetaminophen, albuterol, butalbital-acetaminophen-caffeine -40 mg, dextrose 50%, dextrose 50%, diphenhydrAMINE, glucagon (human recombinant), glucose, glucose, heparin, porcine (PF), insulin aspart U-100, lidocaine-prilocaine, lipase-protease-amylase 24,000-76,000-120,000 units, LORazepam, magnesium sulfate IVPB **AND** magnesium sulfate IVPB, ondansetron, oxyCODONE, potassium chloride 10%  **AND** potassium chloride 10% **AND** potassium chloride 10%, promethazine, tiZANidine    Review of patient's allergies indicates:   Allergen Reactions    Albuterol Palpitations    Colistin Anaphylaxis    Vancomycin analogues      Infusion reaction that does not resolve with slowing    Neupogen [filgrastim] Other (See Comments)     Ostealgia after five daily doses of 300 mcg.      Bactrim [sulfamethoxazole-trimethoprim] Hives    Ceftazidime Hives     Pt stated can tolerate cefapine not ceftazidime    Ceftazidime     Dronabinol Other (See Comments)     Mental changes/hallucinations    Haldol [haloperidol lactate] Other (See Comments)     Seizure like activity    Nsaids (non-steroidal anti-inflammatory drug)      Cannot have due to lung transplant    Adhesive Rash     Cloth tape- please use tegaderm or paper tape    Aztreonam Rash    Ciprofloxacin Nausea And Vomiting     Projectile N/V, per patient.  Unwilling to retry therapy.       Review of Systems   Constitutional: Positive for activity change and fatigue. Negative for chills and fever.   HENT: Positive for congestion, ear pain and rhinorrhea. Negative for sinus pressure and sore throat.    Eyes: Negative.    Respiratory: Positive for cough and shortness of breath.    Cardiovascular: Negative for palpitations and leg swelling.   Gastrointestinal: Positive for nausea (baseline). Negative for abdominal pain, diarrhea and vomiting.   Endocrine: Negative.    Genitourinary: Negative for dysuria, frequency and urgency.   Musculoskeletal: Positive for back pain (chronic). Negative for myalgias.   Skin: Negative for color change, pallor and rash.   Allergic/Immunologic: Positive for immunocompromised state.   Neurological: Negative for dizziness, weakness and headaches.   Hematological: Negative.    Psychiatric/Behavioral: Negative for agitation and behavioral problems.     Objective:   Physical Exam   Constitutional: She is oriented to person, place, and  time. She appears well-developed and well-nourished. No distress.   HENT:   Head: Normocephalic and atraumatic.   Eyes: Conjunctivae and EOM are normal.   Neck: Normal range of motion. Neck supple.   Cardiovascular: Normal rate, regular rhythm, normal heart sounds and intact distal pulses.  Exam reveals no gallop and no friction rub.    No murmur heard.  Pulmonary/Chest: Effort normal. No respiratory distress. She has no wheezes. She has no rales.   Neurological: She is alert and oriented to person, place, and time.   Skin: Skin is warm and dry. Capillary refill takes less than 2 seconds. No rash noted. No erythema. No pallor.   Psychiatric: She has a normal mood and affect. Her behavior is normal. Judgment and thought content normal.   Nursing note and vitals reviewed.        Vital Signs (Most Recent):  Temp: 98 °F (36.7 °C) (05/30/18 1334)  Pulse: 91 (05/30/18 1334)  Resp: 18 (05/30/18 1334)  BP: 130/79 (05/30/18 1334)  SpO2: 95 % (05/30/18 1334) Vital Signs (24h Range):  Temp:  [97.5 °F (36.4 °C)-99.1 °F (37.3 °C)] 98 °F (36.7 °C)  Pulse:  [86-95] 91  Resp:  [18-20] 18  SpO2:  [95 %-99 %] 95 %  BP: (123-139)/(70-86) 130/79     Weight: 58.2 kg (128 lb 4.9 oz)  Body mass index is 25.06 kg/m².      Intake/Output Summary (Last 24 hours) at 05/30/18 1412  Last data filed at 05/30/18 0400   Gross per 24 hour   Intake             1040 ml   Output                0 ml   Net             1040 ml       Significant Labs:  CBC:    Recent Labs  Lab 05/30/18  0400   WBC 11.04   RBC 2.78*   HGB 7.5*   HCT 26.1*   *   MCV 94   MCH 27.0   MCHC 28.7*     BMP:    Recent Labs  Lab 05/30/18  0400      K 4.3      CO2 26   BUN 19   CREATININE 1.0   CALCIUM 8.2*      Tacrolimus Levels:    Recent Labs  Lab 05/30/18  0400   TACROLIMUS 5.9     Microbiology:  Microbiology Results (last 7 days)     Procedure Component Value Units Date/Time    AFB Culture & Smear [[id number]] Collected:  05/29/18 0759    Order Status:   Completed Specimen:  Respiratory from BAL, L Updated:  05/30/18 1333     AFB Culture & Smear Culture in progress     AFB CULTURE STAIN No acid fast bacilli seen.    Narrative:       Bronchial Wash    Fungus culture [[id number]] Collected:  05/29/18 0759    Order Status:  Completed Specimen:  Respiratory from BAL, L Updated:  05/30/18 1027     Fungus (Mycology) Culture Culture in progress    Narrative:       Bronchial Wash    Culture, Respiratory  - Cystic Fibrosis [[id number]] Collected:  05/27/18 1834    Order Status:  Completed Specimen:  Respiratory from Sputum, Expectorated Updated:  05/30/18 0901     RESPIRATORY CULTURE - CYSTIC FIBROSIS Normal respiratory anibal     Gram Stain (Respiratory) <10 epithelial cells per low power field.     Gram Stain (Respiratory) Many WBC's     Gram Stain (Respiratory) No organisms seen    Culture, Respiratory  - Cystic Fibrosis [[id number]] Collected:  05/28/18 2253    Order Status:  Completed Specimen:  Respiratory from Sputum Updated:  05/30/18 0753     RESPIRATORY CULTURE - CYSTIC FIBROSIS No Growth     Gram Stain (Respiratory) <10 epithelial cells per low power field.     Gram Stain (Respiratory) No WBC's     Gram Stain (Respiratory) No organisms seen    Narrative:       CULTURE FROM NASAL CAVITY BILATERALLY    Culture, Respiratory [[id number]] Collected:  05/29/18 0759    Order Status:  Completed Specimen:  Respiratory from BAL, L Updated:  05/30/18 0749     Respiratory Culture No Growth     Gram Stain (Respiratory) <10 epithelial cells per low power field.     Gram Stain (Respiratory) No WBC's     Gram Stain (Respiratory) No organisms seen    Narrative:       Bronchial Wash    Blood culture [[id number]] Collected:  05/25/18 1643    Order Status:  Completed Specimen:  Blood from Peripheral, Hand, Right Updated:  05/29/18 2212     Blood Culture, Routine No Growth to date     Blood Culture, Routine No Growth to date     Blood Culture, Routine No Growth to date     Blood  Culture, Routine No Growth to date     Blood Culture, Routine No Growth to date    Respiratory Viral Panel by PCR [facility]; Nasal Swab [[id number]] Collected:  05/26/18 1159    Order Status:  Sent Specimen:  Respiratory Updated:  05/26/18 1222          I have reviewed all pertinent labs within the past 24 hours.    Diagnostic Results:  none further

## 2018-05-30 NOTE — ASSESSMENT & PLAN NOTE
May increase insulin needs; prandial excursion noted.   Solumedrol 500 mg x3 first dose 5/27/18, completed yesterday.

## 2018-05-30 NOTE — ASSESSMENT & PLAN NOTE
History of acute mastoiditis in April 2017. Follows with Dr. Olsen ENT.     Continue with cetirizine and montelukast.   RVP from nasal swab pending.    ENT consulted and appreciate recs.  Has a history of recurrent Pseudomonas PNA and continued sinusitis.  PE tube placement today per ENT.

## 2018-05-30 NOTE — PLAN OF CARE
Problem: Patient Care Overview  Goal: Plan of Care Review  Outcome: Ongoing (interventions implemented as appropriate)  AAOx3, afebrile, c/o pain. Pain relieved by prn and scheduled pain medications. BG monitored ACHS 2am and tx per order. Pt on 3L of O2 NC. IV antibiotics given. Vanc trough this morning. See results. Contact precautions maintained. Pt able to position self independently. Pt in lowest position, side rails up x2, non-skid foot wear in place, call light within reach, pt verbalized understanding to call RN when needed. Hand hygiene practiced per protocol. Will continue to monitor.

## 2018-05-30 NOTE — ASSESSMENT & PLAN NOTE
Current outpatient regimen: tacrolimus 3.5 mg BID, prednisone 5 mg.  Not on MMF due to leukopenia in the past.    Will continue with current immunosuppression. Monitor daily tacrolimus levels and dose adjust accordingly. Tac level 5.9 today.

## 2018-05-31 ENCOUNTER — TELEPHONE (OUTPATIENT)
Dept: TRANSPLANT | Facility: CLINIC | Age: 28
End: 2018-05-31

## 2018-05-31 VITALS
SYSTOLIC BLOOD PRESSURE: 159 MMHG | TEMPERATURE: 98 F | DIASTOLIC BLOOD PRESSURE: 83 MMHG | RESPIRATION RATE: 16 BRPM | BODY MASS INDEX: 25.11 KG/M2 | OXYGEN SATURATION: 93 % | HEIGHT: 60 IN | WEIGHT: 127.88 LBS | HEART RATE: 122 BPM

## 2018-05-31 LAB
1,3 BETA GLUCAN SPEC-MCNC: 35 PG/ML
ALBUMIN SERPL BCP-MCNC: 3 G/DL
ALP SERPL-CCNC: 106 U/L
ALT SERPL W/O P-5'-P-CCNC: 36 U/L
ANION GAP SERPL CALC-SCNC: 4 MMOL/L
AST SERPL-CCNC: 19 U/L
BACTERIA SPEC AEROBE CULT: NORMAL
BACTERIA SPT CF RESP CULT: NORMAL
BACTERIA SPT CF RESP CULT: NORMAL
BASOPHILS # BLD AUTO: 0 K/UL
BASOPHILS NFR BLD: 0 %
BILIRUB SERPL-MCNC: 0.3 MG/DL
BUN SERPL-MCNC: 21 MG/DL
CALCIUM SERPL-MCNC: 8.3 MG/DL
CHLORIDE SERPL-SCNC: 109 MMOL/L
CO2 SERPL-SCNC: 29 MMOL/L
CREAT SERPL-MCNC: 0.9 MG/DL
DIFFERENTIAL METHOD: ABNORMAL
ENTEROVIRUS: NOT DETECTED
ENTEROVIRUS: NOT DETECTED
EOSINOPHIL # BLD AUTO: 0 K/UL
EOSINOPHIL NFR BLD: 0.1 %
ERYTHROCYTE [DISTWIDTH] IN BLOOD BY AUTOMATED COUNT: 15.4 %
EST. GFR  (AFRICAN AMERICAN): >60 ML/MIN/1.73 M^2
EST. GFR  (NON AFRICAN AMERICAN): >60 ML/MIN/1.73 M^2
GALACTOMANNAN AG SERPL IA-ACNC: <0.5 INDEX
GLUCOSE SERPL-MCNC: 176 MG/DL
GRAM STN SPEC: NORMAL
HCT VFR BLD AUTO: 26.6 %
HGB BLD-MCNC: 8 G/DL
HUMAN BOCAVIRUS: NOT DETECTED
HUMAN BOCAVIRUS: NOT DETECTED
HUMAN CORONAVIRUS, COMMON COLD VIRUS: NOT DETECTED
HUMAN CORONAVIRUS, COMMON COLD VIRUS: NOT DETECTED
IMM GRANULOCYTES # BLD AUTO: 0.08 K/UL
IMM GRANULOCYTES NFR BLD AUTO: 0.9 %
INFLUENZA A - H1N1-09: NOT DETECTED
INFLUENZA A - H1N1-09: NOT DETECTED
LEGIONELLA PNEUMOPHILA: NOT DETECTED
LYMPHOCYTES # BLD AUTO: 1.2 K/UL
LYMPHOCYTES NFR BLD: 14.2 %
MAGNESIUM SERPL-MCNC: 1.7 MG/DL
MCH RBC QN AUTO: 27.3 PG
MCHC RBC AUTO-ENTMCNC: 30.1 G/DL
MCV RBC AUTO: 91 FL
MONOCYTES # BLD AUTO: 0.5 K/UL
MONOCYTES NFR BLD: 6.2 %
MORAXELLA CATARRHALIS: NOT DETECTED
NEUTROPHILS # BLD AUTO: 6.6 K/UL
NEUTROPHILS NFR BLD: 78.6 %
NRBC BLD-RTO: 0 /100 WBC
PARAINFLUENZA: NOT DETECTED
PARAINFLUENZA: NOT DETECTED
PLATELET # BLD AUTO: 124 K/UL
PMV BLD AUTO: 11.1 FL
POCT GLUCOSE: 143 MG/DL (ref 70–110)
POCT GLUCOSE: 155 MG/DL (ref 70–110)
POCT GLUCOSE: 253 MG/DL (ref 70–110)
POTASSIUM SERPL-SCNC: 3.9 MMOL/L
PROT SERPL-MCNC: 5.6 G/DL
RBC # BLD AUTO: 2.93 M/UL
RVP - ADENOVIRUS: NOT DETECTED
RVP - ADENOVIRUS: NOT DETECTED
RVP - HUMAN METAPNEUMOVIRUS (HMPV): NOT DETECTED
RVP - HUMAN METAPNEUMOVIRUS (HMPV): NOT DETECTED
RVP - INFLUENZA A: NOT DETECTED
RVP - INFLUENZA A: NOT DETECTED
RVP - INFLUENZA B: NOT DETECTED
RVP - INFLUENZA B: NOT DETECTED
RVP - RESPIRATORY SYNCTIAL VIRUS (RSV) A: NOT DETECTED
RVP - RESPIRATORY SYNCTIAL VIRUS (RSV) A: NOT DETECTED
RVP - RESPIRATORY VIRAL PANEL, SOURCE: NORMAL
RVP - RESPIRATORY VIRAL PANEL, SOURCE: NORMAL
RVP - RHINOVIRUS: NOT DETECTED
RVP - RHINOVIRUS: NOT DETECTED
SODIUM SERPL-SCNC: 142 MMOL/L
TACROLIMUS BLD-MCNC: 4.2 NG/ML
TEM - ACINETOBACTER BAUMANNII: NOT DETECTED
TEM - BORDETELLA PERTUSSIS: NOT DETECTED
TEM - CHLAMYDOPHILA PNEUMONIAE: NOT DETECTED
TEM - KLEBSIELLA PNEUMONIAE: NOT DETECTED
TEM - MRSA: NOT DETECTED
TEM - MYCOPLASMA PNEUMONIAE: NOT DETECTED
TEM - NEISSERIA MENINGITIDIS: NOT DETECTED
TEM - PANTON-VALENTINE: NOT DETECTED
TEM - PSEUDOMONAS AERUGINOSA: NOT DETECTED
TEM - STAPHYLOCOCCUS AUREUS: NOT DETECTED
TEM - STREPTOCOCCUS PNEUMONIAE: NOT DETECTED
TEM - STREPTOCOCCUS PYOGENES A: NOT DETECTED
TEM- HAEMOPHILUS INFLUENZAE B: NOT DETECTED
TEM- HAEMOPHILUS INFLUENZAE: NOT DETECTED
WBC # BLD AUTO: 8.43 K/UL

## 2018-05-31 PROCEDURE — 85025 COMPLETE CBC W/AUTO DIFF WBC: CPT

## 2018-05-31 PROCEDURE — 83735 ASSAY OF MAGNESIUM: CPT

## 2018-05-31 PROCEDURE — 63600175 PHARM REV CODE 636 W HCPCS: Performed by: PHYSICIAN ASSISTANT

## 2018-05-31 PROCEDURE — 63600175 PHARM REV CODE 636 W HCPCS: Performed by: INTERNAL MEDICINE

## 2018-05-31 PROCEDURE — 25000003 PHARM REV CODE 250: Performed by: INTERNAL MEDICINE

## 2018-05-31 PROCEDURE — 80053 COMPREHEN METABOLIC PANEL: CPT

## 2018-05-31 PROCEDURE — 25000003 PHARM REV CODE 250: Performed by: PHYSICIAN ASSISTANT

## 2018-05-31 PROCEDURE — 80197 ASSAY OF TACROLIMUS: CPT

## 2018-05-31 PROCEDURE — 99238 HOSP IP/OBS DSCHRG MGMT 30/<: CPT | Mod: ,,, | Performed by: INTERNAL MEDICINE

## 2018-05-31 PROCEDURE — 99232 SBSQ HOSP IP/OBS MODERATE 35: CPT | Mod: ,,, | Performed by: INTERNAL MEDICINE

## 2018-05-31 RX ORDER — INSULIN ASPART 100 [IU]/ML
0-5 INJECTION, SOLUTION INTRAVENOUS; SUBCUTANEOUS
Status: DISCONTINUED | OUTPATIENT
Start: 2018-05-31 | End: 2018-05-31 | Stop reason: HOSPADM

## 2018-05-31 RX ORDER — TOPIRAMATE 25 MG/1
25 TABLET ORAL 2 TIMES DAILY
Qty: 60 TABLET | Refills: 11
Start: 2018-05-31 | End: 2019-01-01 | Stop reason: SDUPTHER

## 2018-05-31 RX ORDER — DIPHENHYDRAMINE HYDROCHLORIDE 50 MG/ML
50 INJECTION INTRAMUSCULAR; INTRAVENOUS ONCE
Status: COMPLETED | OUTPATIENT
Start: 2018-05-31 | End: 2018-05-31

## 2018-05-31 RX ORDER — INSULIN ASPART 100 [IU]/ML
0-5 INJECTION, SOLUTION INTRAVENOUS; SUBCUTANEOUS
Refills: 0
Start: 2018-05-31 | End: 2018-06-18 | Stop reason: ALTCHOICE

## 2018-05-31 RX ADMIN — LORAZEPAM 2 MG: 1 TABLET ORAL at 06:05

## 2018-05-31 RX ADMIN — HEPARIN 500 UNITS: 100 SYRINGE at 06:05

## 2018-05-31 RX ADMIN — LISINOPRIL 10 MG: 2.5 TABLET ORAL at 08:05

## 2018-05-31 RX ADMIN — AMLODIPINE BESYLATE 10 MG: 10 TABLET ORAL at 08:05

## 2018-05-31 RX ADMIN — OXYCODONE HYDROCHLORIDE 10 MG: 5 TABLET ORAL at 06:05

## 2018-05-31 RX ADMIN — FLUTICASONE PROPIONATE AND SALMETEROL 1 PUFF: 50; 250 POWDER RESPIRATORY (INHALATION) at 08:05

## 2018-05-31 RX ADMIN — TOPIRAMATE 25 MG: 25 TABLET, FILM COATED ORAL at 08:05

## 2018-05-31 RX ADMIN — OXYCODONE HYDROCHLORIDE 10 MG: 5 TABLET ORAL at 11:05

## 2018-05-31 RX ADMIN — DIPHENHYDRAMINE HYDROCHLORIDE 50 MG: 50 INJECTION, SOLUTION INTRAMUSCULAR; INTRAVENOUS at 03:05

## 2018-05-31 RX ADMIN — RIBAVIRIN 400 MG: 200 CAPSULE ORAL at 08:05

## 2018-05-31 RX ADMIN — TACROLIMUS 3 MG: 1 CAPSULE ORAL at 08:05

## 2018-05-31 RX ADMIN — LORAZEPAM 2 MG: 1 TABLET ORAL at 12:05

## 2018-05-31 RX ADMIN — DEXTROSE: 50 INJECTION, SOLUTION INTRAVENOUS at 11:05

## 2018-05-31 RX ADMIN — MICAFUNGIN SODIUM 100 MG: 20 INJECTION, POWDER, LYOPHILIZED, FOR SOLUTION INTRAVENOUS at 03:05

## 2018-05-31 RX ADMIN — OYSTER SHELL CALCIUM WITH VITAMIN D 1 TABLET: 500; 200 TABLET, FILM COATED ORAL at 08:05

## 2018-05-31 RX ADMIN — PANCRELIPASE 6 CAPSULE: 24000; 76000; 120000 CAPSULE, DELAYED RELEASE PELLETS ORAL at 11:05

## 2018-05-31 RX ADMIN — INSULIN ASPART 5 UNITS: 100 INJECTION, SOLUTION INTRAVENOUS; SUBCUTANEOUS at 11:05

## 2018-05-31 RX ADMIN — DIPHENHYDRAMINE HYDROCHLORIDE 50 MG: 50 INJECTION, SOLUTION INTRAMUSCULAR; INTRAVENOUS at 06:05

## 2018-05-31 RX ADMIN — MYCOPHENOLATE MOFETIL 500 MG: 250 CAPSULE ORAL at 08:05

## 2018-05-31 RX ADMIN — DIPHENHYDRAMINE HYDROCHLORIDE 50 MG: 50 INJECTION, SOLUTION INTRAMUSCULAR; INTRAVENOUS at 01:05

## 2018-05-31 RX ADMIN — OXYCODONE HYDROCHLORIDE 10 MG: 5 TABLET ORAL at 02:05

## 2018-05-31 RX ADMIN — PREDNISONE 5 MG: 5 TABLET ORAL at 08:05

## 2018-05-31 RX ADMIN — CEFEPIME 2 G: 2 INJECTION, POWDER, FOR SOLUTION INTRAVENOUS at 06:05

## 2018-05-31 RX ADMIN — FOLIC ACID 1000 MCG: 1 TABLET ORAL at 08:05

## 2018-05-31 RX ADMIN — INSULIN ASPART 2 UNITS: 100 INJECTION, SOLUTION INTRAVENOUS; SUBCUTANEOUS at 11:05

## 2018-05-31 RX ADMIN — MORPHINE SULFATE 15 MG: 15 TABLET, EXTENDED RELEASE ORAL at 08:05

## 2018-05-31 RX ADMIN — VANCOMYCIN HYDROCHLORIDE 750 MG: 10 INJECTION, POWDER, LYOPHILIZED, FOR SOLUTION INTRAVENOUS at 12:05

## 2018-05-31 RX ADMIN — TACROLIMUS 0.5 MG: 0.5 CAPSULE ORAL at 08:05

## 2018-05-31 RX ADMIN — Medication 400 MG: at 08:05

## 2018-05-31 RX ADMIN — DAPSONE 100 MG: 100 TABLET ORAL at 08:05

## 2018-05-31 RX ADMIN — TIZANIDINE 6 MG: 2 TABLET ORAL at 02:05

## 2018-05-31 RX ADMIN — MONTELUKAST SODIUM 10 MG: 10 TABLET, FILM COATED ORAL at 08:05

## 2018-05-31 RX ADMIN — GABAPENTIN 1100 MG: 400 CAPSULE ORAL at 08:05

## 2018-05-31 RX ADMIN — HEPARIN 500 UNITS: 100 SYRINGE at 01:05

## 2018-05-31 RX ADMIN — INSULIN ASPART 2 UNITS: 100 INJECTION, SOLUTION INTRAVENOUS; SUBCUTANEOUS at 02:05

## 2018-05-31 RX ADMIN — FLUTICASONE PROPIONATE 100 MCG: 50 SPRAY, METERED NASAL at 08:05

## 2018-05-31 RX ADMIN — DIPHENHYDRAMINE HYDROCHLORIDE 50 MG: 50 INJECTION, SOLUTION INTRAMUSCULAR; INTRAVENOUS at 12:05

## 2018-05-31 RX ADMIN — CETIRIZINE HYDROCHLORIDE 5 MG: 5 TABLET, FILM COATED ORAL at 08:05

## 2018-05-31 NOTE — DISCHARGE SUMMARY
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 Patient recently admitted to outside hospital in [place] earlier this week and received empiric vancomycin and meropenem for RLL pneumonia. Patient notes that 4-5 days ago she noticed increased rhinorrhea, sinus congestion, CARROLL, and dry cough that feels congested. Patient reports O2 sats at home in high 80s upon exertion on room air. Patient notes that on arrival to outside ED her O2 sats were in low 90s. Patient reports that she continues to feel poorly and unimproved. Patient reports baseline nausea but does note increased fatigue over the last week. Patient reports going on a cruise and snorkeling in [place] and [place] earlier this month that may have exposed her to sick contacts. Denies fevers, chills, vomiting, diarrhea.     Procedure(s) (LRB):  BRONCHOSCOPY; Bronchoscopy with BAL and transbronchial biopsies under general anesthesia (N/A)     Hospital Course: Patient started on empiric vancomycin, cefepime, and ribavirin upon admission. Patient received solumedrol 500 mg IV x4 doses during course of admission. Patient's O2 saturation improved throughout admission and O2 sats were stable upon RA upon discharge. Cough and SOB improved. Patient underwent bronchoscopy without complications on 5/29/18. Respiratory viral panel negative. Respiratory culture of  sputum negative.  Respiratory culture of bronchial wash on 5/29/18 with rare Candida Glabrata. Patient required two 1L IVF boluses throughout admission for tachycardia and palpitations. Patient had bilateral PE tube placement per ENT during admission without complications. Patient discharged home on 10 days of micafungin, prednisone taper and will follow up in outpatient LUT clinic in 2 weeks.     Pneumonia     Previously admitted earlier this week at outside hospital for RLL PNA.  Procalcitonin negative.  Cultures with NGTD.  PA/Lat CXR upon admission with no evidence of pneumonia.       Received empiric Vanc/Cefepime/Ribavirin. Received 4 doses of solumedrol 500 mg IV.           Lung transplant status, bilateral     BLT (CMV D+/R-) secondary to CF. 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    Respiratory culture from 5/29/18 bronchial wash with rare candida glabrata. Patient to complete 10 days of micafungin 100 mg Q24hrs.           Immunosuppression     Current outpatient regimen: tacrolimus 3.5 mg BID, prednisone 5 mg.  Not on MMF due to leukopenia in the past.  Will continue with current immunosuppression. Discharged home with increased prednisone and will taper accordingly.          Prophylactic antibiotic     Continue current outpatient regimen: dapsone 100 mg daily as patient reports severe allergy to Bactrim.       CF related Pancreatic insufficiency     Continue current outpatient regimen of Creon.        Chronic pain with opiate use     Will continue current outpatient regimen per patient's pain management physician and PCP.        Sinusitis, chronic     History of acute mastoiditis in April 2017. Follows with Dr. Olsen ENT.   Continue with cetirizine and montelukast.   RVP negative.  Bilateral PE tube placement on 5/30/18 per ENT.               Consults         Status Ordering Provider      Inpatient consult to Endocrinology  Once     Provider:  (Not yet assigned)    Completed ARTURO LIU     Inpatient consult to ENT  Once     Provider:  (Not yet assigned)    Completed SUSAN PITTMAN     Inpatient consult to Infectious Diseases  Once     Provider:  (Not yet assigned)    Completed ARTURO LIU          Significant Diagnostic Studies: Labs: All labs within the past 24 hours have been reviewed    Pending Diagnostic Studies:     Procedure Component Value Units Date/Time    Aspergillus antigen [[id number]] Collected:  05/27/18 0530    Order Status:  Sent Lab Status:  In process Updated:  05/27/18 0626    Specimen:  Blood from Blood     Fungitell Assay For (1.3)-B-D-Glucans [[id number]] Collected:  05/27/18 0530    Order Status:  Sent Lab Status:  In process Updated:  05/27/18 0626    Specimen:  Blood from Blood         Final Active Diagnoses:    Diagnosis Date Noted POA    PRINCIPAL PROBLEM:  Pneumonia [J18.9] 05/24/2018 Yes    Lung transplant status, bilateral [Z94.2] 06/14/2014 Not Applicable    Immunosuppression [D89.9] 06/12/2014 Yes    Prophylactic antibiotic [Z79.2] 06/30/2014 Not Applicable    Nausea [R11.0] 12/03/2013 Yes    CF related Pancreatic insufficiency [K86.89] 12/20/2013 Yes    Chronic pain with opiate use [G89.29] 12/20/2013 Yes    Sinusitis, chronic [J32.9] 07/26/2013 Yes    Hyperglycemia [R73.9] 06/12/2014 Yes    TMJ arthralgia [M26.629] 05/30/2018 Unknown    Current chronic use of systemic steroids [Z79.52] 05/25/2018 Not Applicable    ETD (Eustachian tube dysfunction), bilateral [H69.83] 04/16/2017 Yes    Hyperkalemia [E87.5] 07/23/2015 Yes      Problems Resolved During this Admission:    Diagnosis Date Noted Date Resolved POA      Discharged Condition: stable    Disposition: Home or Self Care    Medications:  Reconciled Home Medications:      Medication List      START taking these medications    topiramate 25 MG tablet  Commonly known as:   TOPAMAX  Take 1 tablet (25 mg total) by mouth 2 (two) times daily.        CHANGE how you take these medications    fluconazole 150 MG Tab  Commonly known as:  DIFLUCAN  TAKE 1 TABLET BY MOUTH EVERY WEEK  What changed:  See the new instructions.     gabapentin 300 MG capsule  Commonly known as:  NEURONTIN  Take 1 capsule (300 mg total) by mouth 3 (three) times daily.  What changed:  how much to take        CONTINUE taking these medications    amLODIPine 5 MG tablet  Commonly known as:  NORVASC  Take 10 mg by mouth once daily.     ARIPiprazole 2 MG Tab  Commonly known as:  ABILIFY  Take 2 mg by mouth once daily.     butalbital-acetaminophen-caffeine -40 mg -40 mg per tablet  Commonly known as:  FIORICET, ESGIC  TAKE 1 TABLET BY MOUTH EVERY 6 HOURS AS NEEDED FOR HEADACHE     calcium-vitamin D3 500 mg(1,250mg) -200 unit per tablet  Take 1 tablet by mouth 2 (two) times daily with meals.     CREON 24,000-76,000 -120,000 unit capsule  Generic drug:  lipase-protease-amylase 24,000-76,000-120,000 units  TAKE 5 CAPSULES BY MOUTH WITH MEALS AND 4 CAPSULES WITH SNACKS EVERYDAY     dapsone 100 MG Tab  TAKE 1 TABLET BY MOUTH EVERY DAY     diphenhydrAMINE 50 MG tablet  Commonly known as:  BENADRYL  Take 1 tablet (50 mg total) by mouth every 6 (six) hours as needed for Itching.     DULERA 100-5 mcg/actuation Hfaa  Generic drug:  mometasone-formoterol  INHALE 1 PUFF BY MOUTH TWICE DAILY     fexofenadine 180 MG tablet  Commonly known as:  ALLEGRA  Take 180 mg by mouth once daily.     fluticasone 50 mcg/actuation nasal spray  Commonly known as:  FLONASE  INSERT 2 SPRAYS IN EACH NOSTRIL DAILY     folic acid 1 MG tablet  Commonly known as:  FOLVITE  Take 1 tablet (1,000 mcg total) by mouth once daily.     insulin glargine (TOUJEO) 300 unit/mL (1.5 mL) Inpn pen  Commonly known as:  TOUJEO  Inject 3 Units into the skin once daily.     levalbuterol 45 mcg/actuation inhaler  Commonly known as:  XOPENEX HFA  INHALE 1 TO 2 PUFFS  INTO THE LUNGS EVERY 6 HOURS AS NEEDED FOR WHEEZING OR SHORTNESS OF BREATH. USE WITH SPACER.     lisinopril 10 MG tablet  Take 10 mg by mouth once daily.     LORazepam 1 MG tablet  Commonly known as:  ATIVAN  Take 1 mg by mouth every 6 (six) hours as needed for Anxiety.     magnesium oxide 400 mg tablet  Commonly known as:  MAG-OX  Take 1 tablet (400 mg total) by mouth 2 (two) times daily.     mirtazapine 30 MG tablet  Commonly known as:  REMERON  Take 30 mg by mouth every evening.     montelukast 10 mg tablet  Commonly known as:  SINGULAIR  TAKE 1 TABLET BY MOUTH EVERY DAY     morphine 15 MG 12 hr tablet  Commonly known as:  MS CONTIN  Take 15 mg by mouth 3 (three) times daily.     multivit,min52-folic-vitK-cQ10 100-700-10 mcg-mcg-mg Cap cap  Commonly known as:  AQUADEKS  Take 1 capsule by mouth 2 (two) times daily.     mycophenolate 250 mg Cap  Commonly known as:  CELLCEPT  Take 2 capsules (500 mg total) by mouth 2 (two) times daily.     omeprazole 40 MG capsule  Commonly known as:  PRILOSEC  TAKE 1 CAPSULE BY MOUTH EVERY MORNING     ondansetron 8 MG tablet  Commonly known as:  ZOFRAN  TAKE 1 TABLET(8 MG) BY MOUTH EVERY 8 HOURS AS NEEDED     oxyCODONE 5 MG immediate release tablet  Commonly known as:  ROXICODONE  Take 10 mg by mouth every 4 (four) hours as needed for Pain.     polyethylene glycol 17 gram Pwpk  Commonly known as:  GLYCOLAX  Take 17 g by mouth 2 (two) times daily.     predniSONE 5 MG tablet  Commonly known as:  DELTASONE  Take 5 mg by mouth once daily.     PROCHAMBER  Generic drug:  inhalation spacing device  USE AS DIRECTED     promethazine 25 MG tablet  Commonly known as:  PHENERGAN  TAKE 1 TABLET BY MOUTH EVERY 8 HOURS AS NEEDED FOR NAUSEA     * tacrolimus 1 MG Cap  Commonly known as:  PROGRAF  Take 3 capsules (3 mg total) by mouth every 12 (twelve) hours. Daily doses: 3.5 mg every 12 hours.     * tacrolimus 0.5 MG Cap  Commonly known as:  PROGRAF  Take 1 capsule (0.5 mg total) by mouth every 12  (twelve) hours.     tiZANidine 4 MG tablet  Commonly known as:  ZANAFLEX  TAKE 2 TABLETS BY MOUTH EVERY 6 HOURS AS NEEDED        * This list has 2 medication(s) that are the same as other medications prescribed for you. Read the directions carefully, and ask your doctor or other care provider to review them with you.            \  Patient Instructions:     Diet Adult Regular     Activity as tolerated     Notify your health care provider if you experience any of the following:  temperature >100.4     Notify your health care provider if you experience any of the following:  persistent nausea and vomiting or diarrhea     Notify your health care provider if you experience any of the following:  severe uncontrolled pain     Notify your health care provider if you experience any of the following:  redness, tenderness, or signs of infection (pain, swelling, redness, odor or green/yellow discharge around incision site)     Notify your health care provider if you experience any of the following:  difficulty breathing or increased cough     Notify your health care provider if you experience any of the following:  severe persistent headache     Notify your health care provider if you experience any of the following:  worsening rash     Notify your health care provider if you experience any of the following:  persistent dizziness, light-headedness, or visual disturbances     Notify your health care provider if you experience any of the following:  increased confusion or weakness     Notify your health care provider if you experience any of the following:       Follow Up:  Follow-up Information     Jake Alvarez MD In 2 weeks.    Specialty:  Transplant  Contact information:  [address]  [facility] [address]  [phone]                   Jacqueline Dumont PA-C  Lung Transplant  [facility]

## 2018-05-31 NOTE — NURSING
Called to pt's room, pt c/o palpitations. No SOB or CP. -130 bpm resting, ST on telemetry. Received 20 mg IVP lasix today. SpO2 95% on 1L NC. MD on-call will placed orders.

## 2018-05-31 NOTE — PROGRESS NOTES
Home oxygen evaluation completed. Oxy saturations were 93-95% during walk. HR was 118 rest, however increased to the 130s during activity. The patient denied having any complaints despite increased HR. HR return to 117 after ambulation. Will monitor.

## 2018-05-31 NOTE — DISCHARGE INSTRUCTIONS
Take Novolog 5 units with each meal for the rest of today and tomorrow.    Low dose:  Novolog correction based on POC glucose:  150-200: 1 unit  201-250: 2 units  251-300: 3 units  301-350: 4 units  >350: 5 units    Resume your Toujeo 3 units per day.    If you check your sugar tomorrow at dinner and it is less than 150 (i.e. 149 or lower) DO NOT GIVE YOURSELF ANY NOVOLOG

## 2018-05-31 NOTE — ASSESSMENT & PLAN NOTE
May increase insulin needs; prandial excursion noted.   Solumedrol 500 mg x3 first dose 5/27/18, completed 5/29.   Given additional dose of solumedrol 500 mg IV prior to discharge

## 2018-05-31 NOTE — PROGRESS NOTES
Performed education with patient on using Novolog flex pen.  Demonstrated how to properly use pen and patient states has used pen in the past and able to verbalize teach back.  Will continue to monitor patient.

## 2018-05-31 NOTE — TELEPHONE ENCOUNTER
Received message from patient.  Contacted Lacey Gomez and informed her that patient wishes to speak to her.  Lacey stated that she will give the patient a call.

## 2018-05-31 NOTE — TELEPHONE ENCOUNTER
----- Message from Sanaz Raya sent at 5/31/2018 10:50 AM CDT -----  Contact: pt  Needs Advice    Reason for call:   Pt is beng discharged today and wishes to speak with Lacey Gomez  Communication Preference: [phone]  Additional Information:

## 2018-05-31 NOTE — PROGRESS NOTES
"[facility]  Endocrinology  Progress Note    Admit Date: 5/25/2018     Reason for Consult: Management of steroid induced Hyperglycemia     Surgical Procedure and Date: lung transplant 2014    Diabetes diagnosis year: reports PCP told her she had a "borderline GTT"     Home Diabetes Medications:  toujeo 3 units daily     How often checking glucose at home? Does not check   BG readings on regimen:   Hypoglycemia on the regimen?  denies s/s hypoglycemia  Missed doses on regimen?  No    Diabetes Complications include:     none    Complicating diabetes co morbidities:   Glucocorticoid use       HPI:   Patient is a 28 y.o. female with a diagnosis of CF s/p lung transplant, CF pancreatic insufficiency, admitted for SOB, cough and congestion after returning from a cruise with concern for infection. Endo consulted for BG management. Pt reports compliance with toujeo as rx by her PCP for "borderline abnormal GTT" but she does not check BG and reports she does not have a diagnosis of DM. Denies FH DM. Currently taking PDN 5mg daily.           No new subjective & objective note has been filed under this hospital service since the last note was generated.      ASSESSMENT and PLAN    * Pneumonia    On abx  Infection may increase insulin resistance.         Hyperglycemia    History suggestive of steroid induced hyperglycemia and/or CFRDM  BG goal 140-180    Decreased novolog from 10 units with meals to 5 units with meals.   Continue bg monitoring ac/hs and moderate dose correction scale.     Glucose remains elevated; however, it is down-trending following completion of solumedrol. Hyperglycemia from solumedrol will likely last ~36 hours following last dose.     C-peptide WNL 4.40,  A1c 4.7.    On discharge, recommend novolog 5 u TIDWM + LDSSI for correction for the next 48 hours.   Resume home Toujeo 3 u qD.          Current chronic use of systemic steroids    May increase insulin needs; prandial excursion " noted.   Solumedrol 500 mg x3 first dose 5/27/18, completed 5/29.   Given additional dose of solumedrol 500 mg IV prior to discharge          Lung transplant status, bilateral    Per LUT        Immunosuppression    Immunosuppressants may increase insulin resistance.             John Colorado MD  Endocrinology  [facility]

## 2018-05-31 NOTE — NURSING
Patient dc'd home per team orders. Patient and family aware of dc'd. All discharge instructions given to patient by nursing staff and provider. All questions answered. Concerns alleviated. Right CW port flushed with heparin and removed. No dressing applied per patient. Patient left unit via wheelchair per family member.

## 2018-05-31 NOTE — ASSESSMENT & PLAN NOTE
History suggestive of steroid induced hyperglycemia and/or CFRDM  BG goal 140-180    Decreased novolog from 10 units with meals to 5 units with meals.   Continue bg monitoring ac/hs and moderate dose correction scale.     Glucose remains elevated; however, it is down-trending following completion of solumedrol. Hyperglycemia from solumedrol will likely last ~36 hours following last dose.     C-peptide WNL 4.40,  A1c 4.7.    On discharge, recommend novolog 5 u TIDWM + LDSSI for correction for the next 48 hours.   Resume home Toujeo 3 u qD.

## 2018-05-31 NOTE — PROGRESS NOTES
Discharge Note:    SW met with pt to assess needs for discharge. Pt scheduled to discharge home with IV Micafungin. Referral made to Jake with [employer] [phone]/ fx [phone] (due to pt's previously established infusion company now being out of network with pt's insurance). LOKI faxed referral and confirmed with receipt with Jake. Jake ching will teach in afternoon. Pt scheduled to have first dose in hospital.     Pt has been using O2 while in hospital (was on room air this AM) and states does not have any at home. Pt reports PCP was attempting to refer pt for O2 to [facility] in [facility], however when SW contacted [facility] rep stated pt was not in the system and would need new order.     Pt walked by RN to see if she qualifies for O2. Pt SATs did not drop below 93% percent on walk. Pt does not qualify for O2 at this time.    LOKI reviewed discharge plan with pt. Pt aware of, and involved in discharge plan. Pt to discharge home with the assistance of mother. Pt reports coping adequately with discharge however states is anxious to return home.  Pt states all questions were answered to satisfaction.  Pt verbalized no additional needs or concerns at this time.  Contact information provided. SW to remain available.

## 2018-05-31 NOTE — PROGRESS NOTES
Home Oxygen Evaluation    Date Performed: 2018    1) Patient's Home O2 Sat on room air, while at rest: 93%        If O2 sats on room air at rest are 88% or below, patient qualifies. No additional testing needed. Document N/A in steps 2 and 3. If 89% or above, complete steps 2.      2) Patient's O2 Sat on room air while exercisin%        If O2 sats on room air while exercising remain 89% or above patient does not qualify, no further testing needed Document N/A in step 3. If O2 sats on room air while exercising are 88% or below, continue to step 3.      3) Patient's O2 Sat while exercising on O2: N/A         (Must show improvement from #2 for patients to qualify)    If O2 sats improve on oxygen, patient qualifies for portable oxygen. If not, the patient does not qualify.

## 2018-06-01 NOTE — PHYSICIAN QUERY
PT Name: Juanita Ibarra  MR #: [id number]     Physician Query Form - Diagnosis Clarification      CDS/: Krupa Fish               Contact information: [email or web address]     This form is a permanent document in the medical record.     Query Date: June 1, 2018    By submitting this query, we are merely seeking further clarification of documentation.  Please utilize your independent clinical judgment when addressing the question(s) below.     The medical record contains the following:      Findings Supporting Clinical Information Location in Medical Record   Pneumonia-  Previously admitted earlier this week at outside hospital for RLL PNA.  Procalcitonin negative.  Cultures with NGTD.  PA/Lat CXR upon admission with no evidence of pneumonia.                        Received empiric vanco/Cefepime/Ribavirin.  Received 4 doses of solumedrol 500 mg IV.      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    Respiratory culture from 5/29/18 bronchial wash with rare candida glabrata. Patient to complete 10 days of micafungin 100 mg Q24hrs.  DC Summary 5/31          DC Summary 5/31        DC Summary 5/31     Please clarify if the _pneumonia____ diagnosis has been:    [ x ] Ruled In  [  ] Ruled In, Now Resolved  [  ] Ruled Out  [  ] Clinically insignificant  [  ] Clinically undetermined  [  ] Other/Clarification of findings (please specify)_______________________________    Please document in your progress notes daily for the duration of treatment, until resolved, and include in your discharge summary.

## 2018-06-01 NOTE — PROGRESS NOTES
Discharge Medication Note:    Ms. 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     Patient reports going on a cruise and snorkeling in [place] and [place] earlier this month that may have exposed her to sick contacts. Denies fevers, chills, vomiting, diarrhea.     Patient started on empiric vancomycin, cefepime, and ribavirin upon admission. Patient received solumedrol 500 mg IV x4 doses during course of admission. Patient's O2 saturation improved throughout admission and O2 sats were stable upon RA upon discharge. Cough and SOB improved. Patient underwent bronchoscopy without complications on 5/29/18. Respiratory viral panel negative. Respiratory culture of sputum negative.  Respiratory culture of bronchial wash on 5/29/18 with rare Candida Glabrata.    Patient required two 1L IVF boluses throughout admission for tachycardia and palpitations. Patient had bilateral PE tube placement per ENT during admission without complications. Patient discharged home on 10 days of micafungin, prednisone taper and will follow up in outpatient LUT clinic in 2 weeks.     Met with Juanita Ibarra at discharge to review discharge medications and to update the blue medication card.       Juanita Ibarra Ashley   Home Medication Instructions [id number]    Printed on:06/01/18 1135   Medication Information                      amlodipine (NORVASC) 5 MG tablet  Take 10 mg by mouth once daily.              aripiprazole (ABILIFY) 2 MG Tab  Take 2 mg by mouth once daily.             butalbital-acetaminophen-caffeine -40 mg (FIORICET, ESGIC) -40 mg per tablet  TAKE 1 TABLET BY MOUTH EVERY 6 HOURS AS NEEDED FOR HEADACHE             calcium-vitamin D3 500 mg(1,250mg) -200 unit per tablet  Take 1 tablet by mouth 2  (two) times daily with meals.             CREON 24,000-76,000 -120,000 unit capsule  TAKE 5 CAPSULES BY MOUTH WITH MEALS AND 4 CAPSULES WITH SNACKS EVERYDAY             dapsone 100 MG Tab  TAKE 1 TABLET BY MOUTH EVERY DAY             diphenhydrAMINE (BENADRYL) 50 MG tablet  Take 1 tablet (50 mg total) by mouth every 6 (six) hours as needed for Itching.             DULERA 100-5 mcg/actuation HFAA  INHALE 1 PUFF BY MOUTH TWICE DAILY             fexofenadine (ALLEGRA) 180 MG tablet  Take 180 mg by mouth once daily.             fluconazole (DIFLUCAN) 150 MG Tab  TAKE 1 TABLET BY MOUTH EVERY WEEK             fluticasone (FLONASE) 50 mcg/actuation nasal spray  INSERT 2 SPRAYS IN EACH NOSTRIL DAILY             folic acid (FOLVITE) 1 MG tablet  Take 1 tablet (1,000 mcg total) by mouth once daily.             gabapentin (NEURONTIN) 300 MG capsule  Take 1 capsule (300 mg total) by mouth 3 (three) times daily.             insulin aspart U-100 (NOVOLOG) 100 unit/mL InPn pen  Inject 0-5 Units into the skin before meals and at bedtime as needed (Hyperglycemia).             insulin glargine, TOUJEO, (TOUJEO) 300 unit/mL (1.5 mL) InPn pen  Inject 3 Units into the skin once daily.              levalbuterol (XOPENEX HFA) 45 mcg/actuation inhaler  INHALE 1 TO 2 PUFFS INTO THE LUNGS EVERY 6 HOURS AS NEEDED FOR WHEEZING OR SHORTNESS OF BREATH. USE WITH SPACER.             lisinopril 10 MG tablet  Take 10 mg by mouth once daily.             LORazepam (ATIVAN) 1 MG tablet  Take 1 mg by mouth every 6 (six) hours as needed for Anxiety.             magnesium oxide (MAG-OX) 400 mg tablet  Take 1 tablet (400 mg total) by mouth 2 (two) times daily.             mirtazapine (REMERON) 30 MG tablet  Take 30 mg by mouth every evening.             montelukast (SINGULAIR) 10 mg tablet  TAKE 1 TABLET BY MOUTH EVERY DAY             morphine (MS CONTIN) 15 MG 12 hr tablet  Take 15 mg by mouth 3 (three) times daily.             mv. min cmb#[id number]  (AQUADEKS) 100-700-10 mcg-mcg-mg Cap cap  Take 1 capsule by mouth 2 (two) times daily.             mycophenolate (CELLCEPT) 250 mg Cap  Take 2 capsules (500 mg total) by mouth 2 (two) times daily.             omeprazole (PRILOSEC) 40 MG capsule  TAKE 1 CAPSULE BY MOUTH EVERY MORNING             ondansetron (ZOFRAN) 8 MG tablet  TAKE 1 TABLET(8 MG) BY MOUTH EVERY 8 HOURS AS NEEDED             oxycodone (ROXICODONE) 5 MG immediate release tablet  Take 10 mg by mouth every 4 (four) hours as needed for Pain.             polyethylene glycol (GLYCOLAX) 17 gram PwPk  Take 17 g by mouth 2 (two) times daily.             predniSONE (DELTASONE) 5 MG tablet  Take 5 mg by mouth once daily.             PROCHAMBER  USE AS DIRECTED             promethazine (PHENERGAN) 25 MG tablet  TAKE 1 TABLET BY MOUTH EVERY 8 HOURS AS NEEDED FOR NAUSEA             tacrolimus (PROGRAF) 0.5 MG Cap  Take 1 capsule (0.5 mg total) by mouth every 12 (twelve) hours.             tacrolimus (PROGRAF) 1 MG Cap  Take 3 capsules (3 mg total) by mouth every 12 (twelve) hours. Daily doses: 3.5 mg every 12 hours.             tiZANidine (ZANAFLEX) 4 MG tablet  TAKE 2 TABLETS BY MOUTH EVERY 6 HOURS AS NEEDED             topiramate (TOPAMAX) 25 MG tablet  Take 1 tablet (25 mg total) by mouth 2 (two) times daily.                 Pt was provided with prescriptions for the following meds:  micafungin via [facility]      Juanita Ibarra verbalized understanding and had the opportunity to ask questions.

## 2018-06-02 LAB
BACTERIA SPT CF RESP CULT: NO GROWTH
BACTERIA SPT CF RESP CULT: NORMAL
GRAM STN SPEC: NORMAL

## 2018-06-04 NOTE — PHYSICIAN QUERY
PT Name: Juanita Ibarra  MR #: [id number]     Physician Query Form - Documentation Clarification      CDS/: Krupa Fish               Contact information: [email or web address]     This form is a permanent document in the medical record.     Query Date: June 4, 2018    By submitting this query, we are merely seeking further clarification of documentation. Please utilize your independent clinical judgment when addressing the question(s) below.    The Medical record reflects the following:    Supporting Clinical Findings Location in Medical Record   Positive for cough and shortness of breath.    Transplant history complicated by history of MRSA, Pseudomonas PNA, and CMV viremia.    No concern for graft dysfunction at this time. Continue current immunosuppression and prophylaxis    Currently 95% on 2L NC. Continue oxygen supplementation. Titrate for goal sat > 88%.    Patient reports O2 sats at home in high 80s upon exertion on room air. Patient notes that on arrival to outside ED her O2 sats were in low 90s.    Pt on 1-2 L NC. SpO2 92%. Pt attempted to wean off of oxygen. Pt had episode of tachycardia when on room air. Pt placed on tele. Pt sinus tach    pneumonia diagnosis has been: Ruled In. H&P 5/25    H&P 5/25      H&P 5/25      H&P 5/25      H&P 5/25      POC 5/30@453      Physician Query 5/31                                                                                      Doctor, Please specify diagnosis or diagnoses associated with above clinical findings.    Provider Use Only      ( x ) Pneumonia complicating function of transplanted organ     (  ) Pneumonia not complicating function of transplanted organ     (  ) Other___________________                                                                                                         [  ] Clinically undetermined

## 2018-06-05 DIAGNOSIS — E56.9 VITAMIN DEFICIENCY: ICD-10-CM

## 2018-06-11 ENCOUNTER — PATIENT MESSAGE (OUTPATIENT)
Dept: TRANSPLANT | Facility: CLINIC | Age: 28
End: 2018-06-11

## 2018-06-18 ENCOUNTER — HOSPITAL ENCOUNTER (OUTPATIENT)
Dept: PULMONOLOGY | Facility: CLINIC | Age: 28
Discharge: HOME | End: 2018-06-18
Payer: MEDICAID

## 2018-06-18 ENCOUNTER — OFFICE VISIT (OUTPATIENT)
Dept: TRANSPLANT | Facility: CLINIC | Age: 28
End: 2018-06-18
Payer: MEDICAID

## 2018-06-18 ENCOUNTER — HOSPITAL ENCOUNTER (OUTPATIENT)
Dept: RADIOLOGY | Facility: HOSPITAL | Age: 28
Discharge: HOME | End: 2018-06-18
Attending: INTERNAL MEDICINE
Payer: MEDICAID

## 2018-06-18 VITALS
HEART RATE: 122 BPM | RESPIRATION RATE: 20 BRPM | TEMPERATURE: 98 F | BODY MASS INDEX: 22.84 KG/M2 | OXYGEN SATURATION: 99 % | WEIGHT: 121 LBS | DIASTOLIC BLOOD PRESSURE: 75 MMHG | SYSTOLIC BLOOD PRESSURE: 99 MMHG | HEIGHT: 61 IN

## 2018-06-18 DIAGNOSIS — E84.19 CYSTIC FIBROSIS WITH GASTROINTESTINAL MANIFESTATIONS: ICD-10-CM

## 2018-06-18 DIAGNOSIS — Z94.2 LUNG TRANSPLANT STATUS, BILATERAL: ICD-10-CM

## 2018-06-18 DIAGNOSIS — Z94.2 LUNG REPLACED BY TRANSPLANT: ICD-10-CM

## 2018-06-18 DIAGNOSIS — D84.9 IMMUNOSUPPRESSION: ICD-10-CM

## 2018-06-18 DIAGNOSIS — K86.89 PANCREATIC INSUFFICIENCY: ICD-10-CM

## 2018-06-18 DIAGNOSIS — J44.81 BRONCHIOLITIS OBLITERANS SYNDROME: ICD-10-CM

## 2018-06-18 DIAGNOSIS — Z79.2 PROPHYLACTIC ANTIBIOTIC: ICD-10-CM

## 2018-06-18 DIAGNOSIS — Z48.24 ENCOUNTER FOR AFTERCARE FOLLOWING LUNG TRANSPLANT: Primary | ICD-10-CM

## 2018-06-18 LAB
PRE FEV1 FVC: 37
PRE FEV1: 0.93
PRE FVC: 2.52
PREDICTED FEV1 FVC: 88
PREDICTED FEV1: 2.8
PREDICTED FVC: 3.22

## 2018-06-18 PROCEDURE — 99999 PR PBB SHADOW E&M-EST. PATIENT-LVL III: CPT | Mod: PBBFAC,,, | Performed by: INTERNAL MEDICINE

## 2018-06-18 PROCEDURE — 94010 BREATHING CAPACITY TEST: CPT | Mod: 26,S$PBB,, | Performed by: INTERNAL MEDICINE

## 2018-06-18 PROCEDURE — 71046 X-RAY EXAM CHEST 2 VIEWS: CPT | Mod: TC,FY

## 2018-06-18 PROCEDURE — 99214 OFFICE O/P EST MOD 30 MIN: CPT | Mod: 25,S$PBB,, | Performed by: INTERNAL MEDICINE

## 2018-06-18 PROCEDURE — 94010 BREATHING CAPACITY TEST: CPT | Mod: PBBFAC | Performed by: INTERNAL MEDICINE

## 2018-06-18 PROCEDURE — 99213 OFFICE O/P EST LOW 20 MIN: CPT | Mod: PBBFAC,25 | Performed by: INTERNAL MEDICINE

## 2018-06-18 PROCEDURE — 71046 X-RAY EXAM CHEST 2 VIEWS: CPT | Mod: 26,,, | Performed by: RADIOLOGY

## 2018-06-18 NOTE — LETTER
July 17, 2018    Shama Dennison MD  [address]  [address]  [facility]  [place] [address]     Phone: [phone]  Fax: [phone]          Dear Dr. Juma Ibarra has reached her 4 year anniversary following lung  transplantation.  Attached is the summary of the patients most recent  assessment and plan of care.  Our lung transplant team appreciates your referral  of Juanita Ibarra and we look forward to continued patient  collaboration with you.     Sincerely,           Jake Alvarez MD    Director, Lung Transplantation    Pulmonary & Critical Care Medicine     [facility]   [address] [place] [id number]   [phone]     RR/kp

## 2018-06-18 NOTE — PROGRESS NOTES
LUNG TRANSPLANT RECIPIENT FOLLOW-UP    Reason for Visit: Follow-up after lung transplantation.      Date of Transplant: 6/12/14     Reason for Transplant: Cystic fibrosis      Type of Transplant: bilateral sequential lung     CMV Status: D+ / R-      Major Complications:   1. Vocal cord dysfunction- resolved   2. A1 Rejection 8/2014   3. C glabrata positive sputum-recurrent  4. Pseudomonas pneumonia in 02/2015 resolved   5. 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 CARLOS EDUARDO (grade 3)                                                                               History of Present Illness: Juanita Ibarra is a 28 y.o. year old female with the above listed transplant history who presents for routine follow-up.  Since her last visit, she was hospitalized for a lower respiratory tract infection.  She had a bronchoscopy which was negative for rejection and was positive for Candida Glabrata.  She received antibiotics and pulse steroids while in the hospital and then discharged to complete 10 days of Micafungin.  She has since completed this therapy and is feeling well.  States that her dyspnea has improved to her baseline.  She denies any fever, chills, sinus congestion above baseline, cough, or productive sputum.  She had hypoxemia while in the hospital which has now resolved.  She was not discharged on home O2.  She takes her medications as directed without side effects.    Review of Systems   Constitutional: Negative for chills, diaphoresis, fever, malaise/fatigue and weight loss.   HENT: Negative for congestion, ear discharge, ear pain, hearing loss, nosebleeds and sore throat.    Eyes: Negative for blurred vision, double vision, photophobia and pain.   Respiratory: Positive for cough. Negative for hemoptysis, sputum production, shortness of breath, wheezing and stridor.    Cardiovascular: Negative for chest pain, palpitations, orthopnea, claudication, leg swelling and PND.   Gastrointestinal: Negative for abdominal pain, blood in  "stool, constipation, diarrhea, heartburn, melena, nausea and vomiting.   Genitourinary: Negative for dysuria, flank pain, frequency, hematuria and urgency.   Musculoskeletal: Negative for back pain, falls, joint pain, myalgias and neck pain.   Skin: Negative for itching and rash.   Neurological: Negative for dizziness, tingling, tremors, sensory change, focal weakness, seizures, loss of consciousness, weakness and headaches.   Endo/Heme/Allergies: Does not bruise/bleed easily.   Psychiatric/Behavioral: Negative for depression, hallucinations, memory loss and suicidal ideas. The patient is not nervous/anxious and does not have insomnia.      BP 99/75   Pulse (!) 122   Temp 97.9 °F (36.6 °C) (Oral)   Resp 20   Ht 5' 1" (1.549 m)   Wt 54.9 kg (121 lb)   LMP 10/02/2016   SpO2 99%   BMI 22.86 kg/m²     Physical Exam   Constitutional: She is oriented to person, place, and time and well-developed, well-nourished, and in no distress. No distress.   HENT:   Head: Normocephalic and atraumatic.   Nose: Nose normal.   Mouth/Throat: Oropharynx is clear and moist. No oropharyngeal exudate.   Eyes: Conjunctivae and EOM are normal. Pupils are equal, round, and reactive to light. No scleral icterus.   Neck: Normal range of motion. Neck supple. No JVD present. No tracheal deviation present. No thyromegaly present.   Cardiovascular: Normal rate, regular rhythm and intact distal pulses.  Exam reveals no gallop and no friction rub.    No murmur heard.  Pulmonary/Chest: Effort normal and breath sounds normal. No stridor. No respiratory distress. She has no wheezes. She has no rales. She exhibits no tenderness.   Abdominal: Soft. Bowel sounds are normal. She exhibits no distension and no mass. There is no tenderness. There is no rebound and no guarding.   Musculoskeletal: Normal range of motion. She exhibits no edema.   Lymphadenopathy:     She has no cervical adenopathy.   Neurological: She is alert and oriented to person, place, " and time. No cranial nerve deficit. Gait normal. Coordination normal. GCS score is 15.   Skin: Skin is warm and dry. No rash noted. She is not diaphoretic. No erythema. No pallor.   Psychiatric: Mood and affect normal.     Labs:  cbc, cmp, tacrolimus Latest Ref Rng & Units 5/30/2018 5/31/2018 6/18/2018   TACROLIMUS LVL 5.0 - 15.0 ng/mL 5.9 4.2(L) -   WHITE BLOOD CELL COUNT 3.90 - 12.70 K/uL 11.04 8.43 6.51   RBC 4.00 - 5.40 M/uL 2.78(L) 2.93(L) 3.51(L)   HEMOGLOBIN 12.0 - 16.0 g/dL 7.5(L) 8.0(L) 9.6(L)   HEMATOCRIT 37.0 - 48.5 % 26.1(L) 26.6(L) 33.1(L)   MCV 82 - 98 fL 94 91 94   MCH 27.0 - 31.0 pg 27.0 27.3 27.4   MCHC 32.0 - 36.0 g/dL 28.7(L) 30.1(L) 29.0(L)   RDW 11.5 - 14.5 % 15.9(H) 15.4(H) 15.3(H)   PLATELETS 150 - 350 K/uL 118(L) 124(L) 137(L)   MPV 9.2 - 12.9 fL 11.1 11.1 10.7   GRAN # 1.8 - 7.7 K/uL 10.4(H) 6.6 3.5   LYMPH # 1.0 - 4.8 K/uL 0.3(L) 1.2 2.5   MONO # 0.3 - 1.0 K/uL 0.2(L) 0.5 0.5   EOSINOPHIL% 0.0 - 8.0 % 0.0 0.1 1.7   BASOPHIL% 0.0 - 1.9 % 0.1 0.0 0.5   DIFFERENTIAL METHOD - Automated Automated Automated   SODIUM 136 - 145 mmol/L 140 142 139   POTASSIUM 3.5 - 5.1 mmol/L 4.3 3.9 4.6   CHLORIDE 95 - 110 mmol/L 109 109 111(H)   CO2 23 - 29 mmol/L 26 29 19(L)   GLUCOSE 70 - 110 mg/dL 297(H) 176(H) 119(H)   BUN BLD 6 - 20 mg/dL 19 21(H) 16   CREATININE 0.5 - 1.4 mg/dL 1.0 0.9 1.1   CALCIUM 8.7 - 10.5 mg/dL 8.2(L) 8.3(L) 9.1   PROTEIN TOTAL 6.0 - 8.4 g/dL 5.8(L) 5.6(L) 7.2   ALBUMIN 3.5 - 5.2 g/dL 3.0(L) 3.0(L) 4.0   BILIRUBIN TOTAL 0.1 - 1.0 mg/dL 0.3 0.3 0.4   ALK PHOS 55 - 135 U/L 105 106 111   AST 10 - 40 U/L 9(L) 19 12   ALT 10 - 44 U/L 14 36 16   ANION GAP 8 - 16 mmol/L 5(L) 4(L) 9   EGFR IF AFRICAN AMERICAN >60 mL/min/1.73 m:2 >60.0 >60.0 >60.0   EGFR IF NON-AFRICAN AMERICAN >60 mL/min/1.73 m:2 >60.0 >60.0 >60.0     Pulmonary Function Tests 6/18/2018 4/23/2018 1/15/2018 11/13/2017 9/25/2017 7/25/2017 6/29/2017   FVC 2.52 2.21 2.2 2.03 2.3 2.36 2.2   FEV1 0.93 0.88 0.9 0.84 0.88 0.94 0.88    TLC (liters) - - - - - - -   DLCO (ml/mmHg sec) - - - - - - -   FVC% 78 69 68 63 71 73 68   FEV1% 33 31 32 30 31 33 31   FEF 25-75 0.35 0.36 0.39 0.33 0.35 0.36 0.36   FEF 25-75% 11 11 12 10 10 11 11   TLC% - - - - - - -   RV - - - - - - -   RV% - - - - - - -   DLCO% - - - - - - -       Imaging:  Results for orders placed during the hospital encounter of 06/18/18   X-Ray Chest PA And Lateral    Narrative EXAMINATION:  XR CHEST PA AND LATERAL    CLINICAL HISTORY:  Lung transplant status    TECHNIQUE:  PA and lateral views of the chest were performed.    COMPARISON:  Chest radiographs: 05/29/2018.  05/25/2018..  03/06/2017.  03/19/2018.    CT of the abdomen and pelvis: 12/21/2016.    FINDINGS:  Port overlies the right hemithorax on PA view.  Tip of the attached catheter lies in the superior vena cava near its junction with the right atrium.    Sternal sutures are intact and aligned.    Mediastinal structures are midline.  Cardiac silhouette and pulmonary vascular distribution are normal.  As observed on CT of 12/21/2018 there is calcification in the inferior aspect of the right atrium near its junction with the IVC.    Lung volumes are normal and symmetric.  I detect no pulmonary disease, pleural fluid, lymph node enlargement, cardiac decompensation, pneumothorax, pneumomediastinum, pneumoperitoneum or significant osseous abnormality.      Impression No acute disease identified in this patient status post bilateral sequential lung transplant for cystic fibrosis.      Electronically signed by: Adela Davalos MD  Date:    06/18/2018  Time:    08:46       Assessment:  1. Encounter for aftercare following lung transplant    2. Lung transplant status, bilateral    3. Immunosuppression    4. Prophylactic antibiotic    5. Bronchiolitis obliterans syndrome    6. CF related Pancreatic insufficiency    7. Cystic fibrosis with gastrointestinal manifestations      Plan:   1. FEV1 has improved from previous visits and is back  to baseline for her known CARLOS EDUARDO stage 3.  CXR without acute changes.  Symptomatically doing well since being treated with pulse steroids and Micafungin for C. Glabrata on her most recent hospitalization. Will continue to monitor on routine visits.    2. Continue with Tacrolimus, Prednisone, and MMF.  MMF dose is 500mg BID.  Will follow-up Tacrolimus level and adjust as needed.    3. Continue with Dapsone.  She reports a severe Bactrim allergy.    4. Has known CARLOS EDUARDO stage 3.  Does not require supplemental oxygen therapy.  FEV1 remains stable.  Continue exercise.    5. Will change creon to 36,000 unit capsules to reduce pill burden.  Prescription sent to her pharmacy today.    6. Follow-up in 3 months or earlier if needed.    Francisca Morin DO  PCCM Fellow    Attending Note:    I have seen and evaluated the patient with the fellow. Their note reflects the content of our discussion and my plan of care.      Jake Alvarez MD  Pulmonary/Critical Care Medicine

## 2018-06-22 ENCOUNTER — PATIENT MESSAGE (OUTPATIENT)
Dept: TRANSPLANT | Facility: CLINIC | Age: 28
End: 2018-06-22

## 2018-06-22 DIAGNOSIS — R11.0 NAUSEA: ICD-10-CM

## 2018-06-25 RX ORDER — ONDANSETRON HYDROCHLORIDE 8 MG/1
8 TABLET, FILM COATED ORAL EVERY 8 HOURS PRN
Qty: 30 TABLET | Refills: 0 | Status: SHIPPED | OUTPATIENT
Start: 2018-06-25 | End: 2018-01-01 | Stop reason: SDUPTHER

## 2018-06-25 RX ORDER — PROMETHAZINE HYDROCHLORIDE 25 MG/1
25 TABLET ORAL EVERY 8 HOURS PRN
Qty: 60 TABLET | Refills: 0 | Status: SHIPPED | OUTPATIENT
Start: 2018-06-25 | End: 2018-01-01 | Stop reason: SDUPTHER

## 2018-06-25 NOTE — TELEPHONE ENCOUNTER
Received [employer] message from patient who requested refills for Zofran and Phenergan.  Prescriptions sent via [employer] to Dr. Alvarez for review and approval.

## 2018-06-26 LAB — FUNGUS SPEC CULT: NORMAL

## 2018-07-07 RX ORDER — TIZANIDINE 4 MG/1
TABLET ORAL
Qty: 90 TABLET | Refills: 0 | Status: SHIPPED | OUTPATIENT
Start: 2018-07-07 | End: 2018-08-15 | Stop reason: SDUPTHER

## 2018-07-26 ENCOUNTER — PATIENT MESSAGE (OUTPATIENT)
Dept: TRANSPLANT | Facility: CLINIC | Age: 28
End: 2018-07-26

## 2018-07-27 DIAGNOSIS — J30.89 NON-SEASONAL ALLERGIC RHINITIS, UNSPECIFIED TRIGGER: ICD-10-CM

## 2018-07-27 DIAGNOSIS — Z94.2 LUNG REPLACED BY TRANSPLANT: ICD-10-CM

## 2018-07-27 DIAGNOSIS — Z29.89 NEED FOR PNEUMOCYSTIS PROPHYLAXIS: ICD-10-CM

## 2018-07-27 DIAGNOSIS — E53.8 FOLATE DEFICIENCY: ICD-10-CM

## 2018-07-27 NOTE — TELEPHONE ENCOUNTER
Patient messaged and stated that she is out of refills on the attached medications.  Rx entered for all meds requested and sent to Dr. Alvarez for review and approval.

## 2018-07-28 RX ORDER — DAPSONE 100 MG/1
100 TABLET ORAL DAILY
Qty: 30 TABLET | Refills: 11 | Status: ON HOLD | OUTPATIENT
Start: 2018-07-28 | End: 2019-01-01 | Stop reason: SDUPTHER

## 2018-07-28 RX ORDER — MONTELUKAST SODIUM 10 MG/1
10 TABLET ORAL DAILY
Qty: 30 TABLET | Refills: 11 | Status: SHIPPED | OUTPATIENT
Start: 2018-07-28

## 2018-07-28 RX ORDER — FOLIC ACID 1 MG/1
1000 TABLET ORAL DAILY
Qty: 30 TABLET | Refills: 11 | Status: SHIPPED | OUTPATIENT
Start: 2018-07-28 | End: 2019-01-01 | Stop reason: SDUPTHER

## 2018-07-28 RX ORDER — TACROLIMUS 1 MG/1
3 CAPSULE ORAL EVERY 12 HOURS
Qty: 180 CAPSULE | Refills: 11 | Status: SHIPPED | OUTPATIENT
Start: 2018-07-28 | End: 2018-01-01 | Stop reason: DRUGHIGH

## 2018-07-28 RX ORDER — PREDNISONE 5 MG/1
5 TABLET ORAL DAILY
Qty: 30 TABLET | Refills: 11 | Status: ON HOLD | OUTPATIENT
Start: 2018-07-28 | End: 2019-01-01 | Stop reason: SDUPTHER

## 2018-07-31 LAB
ACID FAST MOD KINY STN SPEC: NORMAL
MYCOBACTERIUM SPEC QL CULT: NORMAL

## 2018-08-08 DIAGNOSIS — Z94.2 LUNG REPLACED BY TRANSPLANT: Primary | ICD-10-CM

## 2018-08-08 DIAGNOSIS — E83.42 HYPOMAGNESEMIA: ICD-10-CM

## 2018-08-12 DIAGNOSIS — B37.31 VAGINAL YEAST INFECTION: ICD-10-CM

## 2018-08-13 RX ORDER — FLUCONAZOLE 150 MG/1
TABLET ORAL
Qty: 30 TABLET | Refills: 0 | Status: ON HOLD | OUTPATIENT
Start: 2018-08-13 | End: 2019-01-01 | Stop reason: HOSPADM

## 2018-08-15 RX ORDER — TIZANIDINE 4 MG/1
TABLET ORAL
Qty: 90 TABLET | Refills: 0 | Status: SHIPPED | OUTPATIENT
Start: 2018-08-15 | End: 2018-01-01 | Stop reason: SDUPTHER

## 2018-09-17 NOTE — TELEPHONE ENCOUNTER
----- Message from Love Khan sent at 9/15/2018  1:16 PM CDT -----  Contact: [facility] Representative  A Hospital Representative from [facility] called to inform the provider that the pt will not be at her appt on Monday, September 17, 2018 due to being in the hospital.    Thanks

## 2018-09-17 NOTE — TELEPHONE ENCOUNTER
Patient sent a [employer] message this morning to inform us that she is hospitalized in [place] so she will miss her appointments scheduled for today.  In her message, patient stated her local provider will call Dr. Alvarez today to determine a plan of care.  Replied to patient with message that we will cancel her appointments, which will be rescheduled based on instructions from Dr. Alvarez.

## 2018-09-27 NOTE — ASSESSMENT & PLAN NOTE
Concern for possible infectious etiology versus progression of CARLOS EDUARDO. Patient last received half pulse steroids x 4 doses in May 2018.   CT Chest 9/25 from [facility] reviewed by Dr. aMe. Patient has recently received 2 weeks of IV Vancomycin and Meropenem at [facility].     Will continue IV Vancomycin and Meropenem and consider possible OR bronchoscopy next week. RPP, respiratory culture, CXR PA and Lat pending. Patient currently with good oxygenation on room air at 95%. Patient wears 2-3L of oxygen PRN. Maintain O2 sats > 88%.

## 2018-09-27 NOTE — HPI
"Mrs. 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 Patient presents as a transfer from [facility] in [place]. Patient reports hospital admission from 8/22 to 9/16 for sinusitis for which she was treated with IV Vancomycin and Meropenem. Patient noted symptomatic improvement and was subsequently discharged home. Following discharge, patient began to note a "loose" dry cough after a few days. Patient then began to note increasing dyspnea and cough with production of dark yellow, thick sputum which prompted her to present to the ED. Patient was then hospitalized from 9/14 through her transfer and was continuing to receive IV Vancomycin and Meropenem. Patient reports no symptomatic improvement since her most recent hospitalization.    Patient continues to report dry cough, diffuse myalgias, and increased fatigue. Patient reports recently receiving the influenza vaccine following her initial discharge on 9/16. Patient reports chronic, baseline nausea. Patient notes abdominal distention but states that this usually occurs following IV abx.  Patient intermittently using 2-3 L of oxygen via NC but is currently 95% on RA. Denies chest pain, palpitations. Denies vomiting, diarrhea, and constipation. Denies fevers, chills. During previous [facility] admission in May, patient received 4 doses of Solumedrol 500 mg, Vancomycin, Cefepime, and Ribavirin.   "

## 2018-09-27 NOTE — ASSESSMENT & PLAN NOTE
Current outpatient regimen: Tacrolimus 3.5 mg, Prednisone 5 mg daily, and  mg BID.     Will continue current outpatient regimen. Continue daily monitoring of tacrolimus levels and dose adjust accordingly.

## 2018-09-27 NOTE — ASSESSMENT & PLAN NOTE
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     Will continue current immunosuppression and prophylaxis. Concern for possible progression of CARLOS EDUARDO. Patient last received empiric half pulse steroids in May 2018.

## 2018-09-27 NOTE — SUBJECTIVE & OBJECTIVE
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       Past Surgical History:   Procedure Laterality Date    ABDOMINAL SURGERY      peg tube     VZALLSOC-[id number] N/A 5/19/2015    Performed by Deny Dias Jr., MD at [facility] OR    BIOPSY-BRONCHUS N/A 12/20/2016    Performed by Jake Alvarez MD at [facility] OR 2ND FLR    BRONCHOSCOPY N/A 5/29/2018    Procedure: BRONCHOSCOPY; Bronchoscopy with BAL and transbronchial biopsies under general anesthesia;  Surgeon: Jake Alvarez MD;  Location: [facility] OR [address] [place] [address];  Service: Transplant;  Laterality: N/A;    BRONCHOSCOPY Bilateral 12/20/2016    Performed by Jake Alvarez MD at [facility] OR 2ND FLR    BRONCHOSCOPY - flexible bronchoscopy with probable tissue biopsy CPT 99095 N/A 7/21/2015    Performed by Jake Alvarez MD at [facility] OR 2ND FLR    BRONCHOSCOPY - flexible bronchoscopy with probable tissue biospy CPT 35131 N/A 10/20/2015    Performed by Jake Alvarez MD at [facility] OR 2ND FLR    BRONCHOSCOPY - flexible bronchoscopy with tissue biopsy CPT 76023 N/A 9/29/2015    Performed by Jake Alvarez MD at [facility] OR 2ND FLR    BRONCHOSCOPY - flexible bronchoscopy with tissue biopsy CPT 34617 N/A 5/26/2015    Performed by Jake Alvarez MD at [facility] OR 1ST FLR    BRONCHOSCOPY flexible bronchoscopy with tissue biopsy N/A 9/8/2014    Performed by Jake Alvarez MD at [facility] OR 2ND FLR    BRONCHOSCOPY flexible with possible tissue biopsy N/A 8/8/2014    Performed by Jake Alvarez MD at [facility] OR 2ND FLR    BRONCHOSCOPY, BAL, BIOPSIES N/A 3/20/2018     Performed by Jake [facility]varez MD at [facility] OR [address] [place] [address]    BRONCHOSCOPY-FIBEROPTIC N/A 1/29/2016    Performed by Joann Cifeuntes DO at [facility] OR [address] [place] [address]    BRONCHOSCOPY; Bronchoscopy with BAL and transbronchial biopsies under general anesthesia N/A 5/29/2018    Performed by Jake Alvarez MD at [facility] OR [address] [place] [address]    CHOLECYSTECTOMY  2016    CHOLECYSTECTOMY-LAPAROSCOPIC N/A 7/22/2016    Performed by Joshua Goldberg, MD at [facility] OR [address] [place] [address]    ENDOMETRIAL ABLATION  2015    KJB    FESS      FESS Bilateral 10/21/2013    Performed by Jared Whatley MD at [facility] OR [address] [place] [address]    FINE NEEDLE ASPIRATION (FNA)  of a cervical lymphadenopathy  1/29/2016    Performed by Joann Cifuentes DO at [facility] OR [address] [place] [address]    flex bronchoscopy with probable tissue biopsy N/A 7/31/2014    Performed by [facility] Diagnostic Provider at [facility] OR [address] [place] [address]    flexible bronchoscopy with tissue biopsy N/A 12/11/2014    Performed by Jake Alvarez MD at [facility] OR [address] [place] [address]    INJECTION-VOCAL CORD Left 6/24/2014    Performed by Jared Whatley MD at [facility] OR [address] [place] [address]    [id number]-CATH to right chest and removal of portacath to left chests wall. Bilateral 6/24/2014    Performed by Zhen Dorado MD at [facility] OR [address] [place] [address]    LARYNX SURGERY  2016    LUNG TRANSPLANT Bilateral 6/12/14    MYRINGOTOMY W/ TUBES Right 04/2017    MYRINGOTOMY WITH INSERTION OF PE TUBES Right 4/15/2017    [facility]    PLACEMENT-TUBE-PEG  5/15/2014    [facility]    PORTACATH PLACEMENT Right     rt sc    REMOVAL-TUBE-PEG  5/15/2014    Performed by David Moses MD at [facility] OR [address] [place] [address]    ROBOTIC ASSISTED LAPAROSCOPIC HYSTERECTOMY N/A 4/25/2017    Performed by Deny Dias Jr., MD at [facility] OR    SALPINGECTOMY Bilateral 2015    KJB    SALPINGECTOMY-LAPAROSCOPIC Bilateral 5/19/2015    Performed by Deny Dias Jr., MD at [facility] OR    SINUS SURGERY FUNCTIONAL ENDOSCOPIC  WITH NAVIGATION Bilateral 4/3/2017    Performed by Cesar Olsen MD at [facility] OR [address] [place] [address]    SINUS SURGERY FUNCTIONAL ENDOSCOPIC WITH NAVIGATION, 18781, 57362, 91245, 39327 Bilateral 2/5/2015    Performed by Cesar Olsen MD at [facility] OR [address] [place] [address]    THYROPLASTY - Medialization laryngoplasty, cricothyroid subluxation, arytenoid repositioning Left 8/2/2016    [facility]    TRANSPLANT-LUNG Bilateral 6/11/2014    Performed by Adolfo Huston MD at [facility] OR [address] [place] [address]       Review of patient's allergies indicates:   Allergen Reactions    Albuterol Palpitations    Colistin Anaphylaxis    Vancomycin analogues      Infusion reaction that does not resolve with slowing    Neupogen [filgrastim] Other (See Comments)     Ostealgia after five daily doses of 300 mcg.      Bactrim [sulfamethoxazole-trimethoprim] Hives    Ceftazidime Hives     Pt stated can tolerate cefapine not ceftazidime    Ceftazidime     Dronabinol Other (See Comments)     Mental changes/hallucinations    Haldol [haloperidol lactate] Other (See Comments)     Seizure like activity    Nsaids (non-steroidal anti-inflammatory drug)      Cannot have due to lung transplant    Adhesive Rash     Cloth tape- please use tegaderm or paper tape    Aztreonam Rash    Ciprofloxacin Nausea And Vomiting     Projectile N/V, per patient.  Unwilling to retry therapy.       PTA Medications   Medication Sig    aripiprazole (ABILIFY) 2 MG Tab Take 2 mg by mouth once daily.    butalbital-acetaminophen-caffeine -40 mg (FIORICET, ESGIC) -40 mg per tablet TAKE 1 TABLET BY MOUTH EVERY 6 HOURS AS NEEDED FOR HEADACHE    calcium-vitamin D3 500 mg(1,250mg) -200 unit per tablet Take 1 tablet by mouth 2 (two) times daily with meals.    dapsone 100 MG Tab Take 1 tablet (100 mg total) by mouth once daily.    DULERA 100-5 mcg/actuation HFAA INHALE 1 PUFF BY MOUTH TWICE DAILY    fexofenadine (ALLEGRA) 180 MG tablet Take 180  mg by mouth once daily.    fluconazole (DIFLUCAN) 150 MG Tab TAKE 1 TABLET BY MOUTH EVERY WEEK    fluticasone (FLONASE) 50 mcg/actuation nasal spray INSERT 2 SPRAYS IN EACH NOSTRIL DAILY    folic acid (FOLVITE) 1 MG tablet Take 1 tablet (1,000 mcg total) by mouth once daily.    gabapentin (NEURONTIN) 300 MG capsule Take 1 capsule (300 mg total) by mouth 3 (three) times daily. (Patient taking differently: Take 1,100 mg by mouth 3 (three) times daily. )    levalbuterol (XOPENEX HFA) 45 mcg/actuation inhaler INHALE 1 TO 2 PUFFS INTO THE LUNGS EVERY 6 HOURS AS NEEDED FOR WHEEZING OR SHORTNESS OF BREATH. USE WITH SPACER.    lipase-protease-amylase (CREON) 36,000-114,000- 180,000 unit CpDR Take 4 capsules by mouth 3 (three) times daily with meals. Take 3 with snacks prn.    lisinopril 10 MG tablet Take 10 mg by mouth once daily.    LORazepam (ATIVAN) 1 MG tablet Take 1 mg by mouth every 6 (six) hours as needed for Anxiety.    magnesium oxide (MAG-OX) 400 mg tablet Take 1 tablet (400 mg total) by mouth 2 (two) times daily.    montelukast (SINGULAIR) 10 mg tablet Take 1 tablet (10 mg total) by mouth once daily.    morphine (MS CONTIN) 15 MG 12 hr tablet Take 15 mg by mouth 3 (three) times daily.    multivit,min52-folic-vitK-cQ10 (AQUADEKS) 100-700-10 mcg-mcg-mg Cap cap 1 tab twice daily    mycophenolate (CELLCEPT) 250 mg Cap Take 2 capsules (500 mg total) by mouth 2 (two) times daily.    omeprazole (PRILOSEC) 40 MG capsule TAKE 1 CAPSULE BY MOUTH EVERY MORNING    ondansetron (ZOFRAN) 8 MG tablet Take 1 tablet (8 mg total) by mouth every 8 (eight) hours as needed for Nausea.    oxycodone (ROXICODONE) 5 MG immediate release tablet Take 10 mg by mouth every 4 (four) hours as needed for Pain.    predniSONE (DELTASONE) 5 MG tablet Take 1 tablet (5 mg total) by mouth once daily.    PROCHAMBER USE AS DIRECTED    promethazine (PHENERGAN) 25 MG tablet Take 1 tablet (25 mg total) by mouth every 8 (eight) hours as  needed.    tacrolimus (PROGRAF) 0.5 MG Cap Take 1 capsule (0.5 mg total) by mouth every 12 (twelve) hours.    tacrolimus (PROGRAF) 1 MG Cap Take 3 capsules (3 mg total) by mouth every 12 (twelve) hours. Daily doses: 3.5 mg every 12 hours.    tiZANidine (ZANAFLEX) 4 MG tablet TAKE 2 TABLETS BY MOUTH EVERY 6 HOURS AS NEEDED    topiramate (TOPAMAX) 25 MG tablet Take 1 tablet (25 mg total) by mouth 2 (two) times daily.    amlodipine (NORVASC) 5 MG tablet Take 10 mg by mouth once daily.     diphenhydrAMINE (BENADRYL) 50 MG tablet Take 1 tablet (50 mg total) by mouth every 6 (six) hours as needed for Itching.    mirtazapine (REMERON) 30 MG tablet Take 30 mg by mouth every evening.    polyethylene glycol (GLYCOLAX) 17 gram PwPk Take 17 g by mouth 2 (two) times daily.     Family History     Problem Relation (Age of Onset)    Drug abuse Maternal Grandfather    Thrombocytopenia Maternal Grandfather        Tobacco Use    Smoking status: Never Smoker    Smokeless tobacco: Never Used   Substance and Sexual Activity    Alcohol use: No     Comment: Has not had an alcoholic drink in more than 2 months.    Drug use: No    Sexual activity: Yes     Partners: Male     Birth control/protection: Implant     Review of Systems   Constitutional: Positive for fatigue. Negative for appetite change, chills, diaphoresis and fever.   HENT: Positive for congestion (chronic, stable). Negative for postnasal drip, sore throat and trouble swallowing.    Respiratory: Positive for cough and shortness of breath.    Cardiovascular: Negative for chest pain, palpitations and leg swelling.   Gastrointestinal: Positive for abdominal distention. Negative for abdominal pain, constipation, diarrhea, nausea and vomiting.   Endocrine: Negative for polydipsia, polyphagia and polyuria.   Genitourinary: Negative for dysuria, frequency and urgency.   Musculoskeletal: Positive for myalgias. Negative for back pain, neck pain and neck stiffness.   Skin:  Negative for color change, pallor and rash.   Neurological: Positive for headaches (chronic, well controlled on current regimen). Negative for dizziness and light-headedness.   Psychiatric/Behavioral: Negative for agitation, behavioral problems and confusion. The patient is nervous/anxious.      Objective:     Vital Signs (Most Recent):  Temp: 98.4 °F (36.9 °C) (09/27/18 1430)  Pulse: 107 (09/27/18 1430)  Resp: 18 (09/27/18 1430)  BP: (!) 127/90 (09/27/18 1430)  SpO2: 95 % (09/27/18 1430) Vital Signs (24h Range):  Temp:  [96.5 °F (35.8 °C)-98.4 °F (36.9 °C)] 98.4 °F (36.9 °C)  Pulse:  [] 107  Resp:  [16-20] 18  SpO2:  [94 %-96 %] 95 %  BP: (104-127)/(76-90) 127/90        There is no height or weight on file to calculate BMI.    No intake or output data in the 24 hours ending 09/27/18 1559    Physical Exam   Constitutional: She is oriented to person, place, and time. She appears well-developed and well-nourished. She has a sickly appearance. No distress.   HENT:   Head: Normocephalic and atraumatic.   Right Ear: External ear normal.   Left Ear: External ear normal.   Nose: Nose normal.   Mouth/Throat: Oropharynx is clear and moist. No oropharyngeal exudate.   Eyes: Conjunctivae and EOM are normal.   Neck: Normal range of motion. Neck supple. No JVD present.   Cardiovascular: Normal rate, regular rhythm, normal heart sounds and intact distal pulses. Exam reveals no gallop and no friction rub.   No murmur heard.  Pulmonary/Chest: Effort normal. She has decreased breath sounds in the right middle field, the right lower field, the left middle field and the left lower field. She has no wheezes. She has no rhonchi. She has rales in the right lower field and the left lower field.   Abdominal: Soft. Bowel sounds are normal. She exhibits distension. There is no tenderness. There is no guarding.   Musculoskeletal: Normal range of motion. She exhibits no edema.   Lymphadenopathy:     She has no cervical adenopathy.    Neurological: She is alert and oriented to person, place, and time. No cranial nerve deficit.   Skin: Skin is warm and dry. Capillary refill takes less than 2 seconds. No rash noted. She is not diaphoretic. No erythema. There is pallor.   Psychiatric: Her speech is normal and behavior is normal. Thought content normal. Her mood appears anxious. She exhibits a depressed mood.   Nursing note and vitals reviewed.      Significant Labs:  CBC:  No results for input(s): WBC, RBC, HGB, HCT, PLT, MCV, MCH, MCHC in the last 72 hours.  BMP:  No results for input(s): GLUCOSE, NA, K, CL, CO2, BUN, CREATININE, CALCIUM in the last 72 hours.     I have reviewed all pertinent labs within the past 24 hours.    Diagnostic Results:  Labs: Reviewed  X-Ray: Reviewed  CT: Reviewed

## 2018-09-27 NOTE — H&P
"[facility]  Lung Transplant  H&P    Patient Name: Juanita Ibarra  MRN: [id number]  Admission Date: 9/27/2018  Attending Physician: Wiliam Mae MD  Primary Care Provider: Jake Alvarez MD     Subjective:     History of Present Illness:  Mrs. 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 Patient presents as a transfer from [facility] in [place]. Patient reports hospital admission from 8/22 to 9/16 for sinusitis for which she was treated with IV Vancomycin and Meropenem. Patient noted symptomatic improvement and was subsequently discharged home. Following discharge, patient began to note a "loose" dry cough after a few days. Patient then began to note increasing dyspnea and cough with production of dark yellow, thick sputum which prompted her to present to the ED. Patient was then hospitalized from 9/14 through her transfer and was continuing to receive IV Vancomycin and Meropenem. Patient reports no symptomatic improvement since her most recent hospitalization.    Patient continues to report dry cough, diffuse myalgias, and increased fatigue. Patient reports recently receiving the influenza vaccine following her initial discharge on 9/16. Patient reports chronic, baseline nausea. Patient notes abdominal distention but states that this usually occurs following IV abx.  Patient intermittently using 2-3 L of oxygen via NC but is currently 95% on RA. Denies chest pain, palpitations. Denies vomiting, diarrhea, and constipation. Denies fevers, chills. [place] [facility] [place], patient received 4 doses of Solumedrol 500 mg, Vancomycin, Cefepime, and Ribavirin.     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       Past Surgical History:   Procedure Laterality Date    ABDOMINAL SURGERY      peg tube     [id number]-RFKIUALYSOD-DMZEGRFTFYXM N/A 5/19/2015    Performed by Deny Dias Jr., MD at [facility] OR    BIOPSY-BRONCHUS N/A 12/20/2016    Performed by Jake Alvarez MD at [facility] OR 2ND FLR    BRONCHOSCOPY N/A 5/29/2018    Procedure: BRONCHOSCOPY; Bronchoscopy with BAL and transbronchial biopsies under general anesthesia;  Surgeon: Jake Alvarez MD;  Location: [facility] OR [address] [place] [address];  Service: Transplant;  Laterality: N/A;    BRONCHOSCOPY Bilateral 12/20/2016    Performed by Jake Alvarez MD at [facility] OR 2ND FLR    BRONCHOSCOPY - flexible bronchoscopy with probable tissue biopsy CPT 25012 N/A 7/21/2015    Performed by Jake Alvarez MD at [facility] OR 2ND FLR    BRONCHOSCOPY - flexible bronchoscopy with probable tissue biospy CPT 56412 N/A 10/20/2015    Performed by Jake Alvarez MD at [facility] OR 2ND FLR    BRONCHOSCOPY - flexible bronchoscopy with tissue biopsy CPT 71452 N/A 9/29/2015    Performed by Jake Alvarez MD at [facility] OR 2ND FLR    BRONCHOSCOPY - flexible bronchoscopy with tissue biopsy CPT 61805 N/A 5/26/2015    Performed by Jake Alvarez MD at [facility] OR 1ST FLR    BRONCHOSCOPY flexible bronchoscopy with tissue biopsy N/A 9/8/2014    Performed by Jake Alvarez MD at [facility] OR 2ND FLR    BRONCHOSCOPY flexible with possible tissue biopsy N/A 8/8/2014    Performed by Jake Alvarez MD at [facility] OR 2ND FLR    BRONCHOSCOPY, BAL, BIOPSIES N/A 3/20/2018    Performed by Jake Alvarez MD at [facility] OR 2ND FLR    BRONCHOSCOPY-FIBEROPTIC N/A 1/29/2016    Performed by  Joann Cifuentes DO at [facility] OR [address] [place] [address]    BRONCHOSCOPY; Bronchoscopy with BAL and transbronchial biopsies under general anesthesia N/A 5/29/2018    Performed by Jake Alvarez MD at [facility] OR [address] [place] [address]    CHOLECYSTECTOMY  2016    CHOLECYSTECTOMY-LAPAROSCOPIC N/A 7/22/2016    Performed by Joshua Goldberg, MD at [facility] OR [address] [place] [address]    ENDOMETRIAL ABLATION  2015    KJB    FESS      FESS Bilateral 10/21/2013    Performed by Jared Whatley MD at [facility] OR [address] [place] [address]    FINE NEEDLE ASPIRATION (FNA)  of a cervical lymphadenopathy  1/29/2016    Performed by Joann Cifuentes DO at [facility] OR [address] [place] [address]    flex bronchoscopy with probable tissue biopsy N/A 7/31/2014    Performed by [facility] Diagnostic Provider at [facility] OR [address] [place] [address]    flexible bronchoscopy with tissue biopsy N/A 12/11/2014    Performed by Jake Alvarez MD at [facility] OR [address] [place] [address]    INJECTION-VOCAL CORD Left 6/24/2014    Performed by Jared Whatley MD at [facility] OR [address] [place] [address]    BVUOQTXOQ-FEQC-Z-CATH to right chest and removal of portacath to left chests wall. Bilateral 6/24/2014    Performed by Zhen Dorado MD at [facility] OR [address] [place] [address]    LARYNX SURGERY  2016    LUNG TRANSPLANT Bilateral 6/12/14    MYRINGOTOMY W/ TUBES Right 04/2017    MYRINGOTOMY WITH INSERTION OF PE TUBES Right 4/15/2017    Performed by Gary Null MD at [facility] OR [address] [place] [address]    PLACEMENT-TUBE-PEG  5/15/2014    Performed by David Moses MD at [facility] OR [address] [place] [address]    PORTACATH PLACEMENT Right     rt sc    REMOVAL-TUBE-PEG  5/15/2014    Performed by David Moses MD at [facility] OR [address] [place] [address]    ROBOTIC ASSISTED LAPAROSCOPIC HYSTERECTOMY N/A 4/25/2017    Performed by Deny Dias Jr., MD at [facility] OR    SALPINGECTOMY Bilateral 2015    KJB    SALPINGECTOMY-LAPAROSCOPIC Bilateral 5/19/2015    Performed by Deny Dias Jr., MD at [facility] OR    SINUS SURGERY FUNCTIONAL ENDOSCOPIC WITH NAVIGATION Bilateral 4/3/2017    Performed by Cesar Olsen MD at [facility] OR [address] [place] [address]    SINUS SURGERY  FUNCTIONAL ENDOSCOPIC WITH NAVIGATION, 09308, 43593, 23409, 94756 Bilateral 2/5/2015    Performed by Cesar Olsen MD at [facility] OR 2ND FLR    THYROPLASTY - Medialization laryngoplasty, cricothyroid subluxation, arytenoid repositioning Left 8/2/2016    Performed by Gary Null MD at [facility] OR 2ND FLR    TRANSPLANT-LUNG Bilateral 6/11/2014    Performed by Adolfo Huston MD at [facility] OR 2ND FLR       Review of patient's allergies indicates:   Allergen Reactions    Albuterol Palpitations    Colistin Anaphylaxis    Vancomycin analogues      Infusion reaction that does not resolve with slowing    Neupogen [filgrastim] Other (See Comments)     Ostealgia after five daily doses of 300 mcg.      Bactrim [sulfamethoxazole-trimethoprim] Hives    Ceftazidime Hives     Pt stated can tolerate cefapine not ceftazidime    Ceftazidime     Dronabinol Other (See Comments)     Mental changes/hallucinations    Haldol [haloperidol lactate] Other (See Comments)     Seizure like activity    Nsaids (non-steroidal anti-inflammatory drug)      Cannot have due to lung transplant    Adhesive Rash     Cloth tape- please use tegaderm or paper tape    Aztreonam Rash    Ciprofloxacin Nausea And Vomiting     Projectile N/V, per patient.  Unwilling to retry therapy.       PTA Medications   Medication Sig    aripiprazole (ABILIFY) 2 MG Tab Take 2 mg by mouth once daily.    butalbital-acetaminophen-caffeine -40 mg (FIORICET, ESGIC) -40 mg per tablet TAKE 1 TABLET BY MOUTH EVERY 6 HOURS AS NEEDED FOR HEADACHE    calcium-vitamin D3 500 mg(1,250mg) -200 unit per tablet Take 1 tablet by mouth 2 (two) times daily with meals.    dapsone 100 MG Tab Take 1 tablet (100 mg total) by mouth once daily.    DULERA 100-5 mcg/actuation HFAA INHALE 1 PUFF BY MOUTH TWICE DAILY    fexofenadine (ALLEGRA) 180 MG tablet Take 180 mg by mouth once daily.    fluconazole (DIFLUCAN) 150 MG Tab TAKE 1 TABLET BY MOUTH EVERY WEEK     fluticasone (FLONASE) 50 mcg/actuation nasal spray INSERT 2 SPRAYS IN EACH NOSTRIL DAILY    folic acid (FOLVITE) 1 MG tablet Take 1 tablet (1,000 mcg total) by mouth once daily.    gabapentin (NEURONTIN) 300 MG capsule Take 1 capsule (300 mg total) by mouth 3 (three) times daily. (Patient taking differently: Take 1,100 mg by mouth 3 (three) times daily. )    levalbuterol (XOPENEX HFA) 45 mcg/actuation inhaler INHALE 1 TO 2 PUFFS INTO THE LUNGS EVERY 6 HOURS AS NEEDED FOR WHEEZING OR SHORTNESS OF BREATH. USE WITH SPACER.    lipase-protease-amylase (CREON) 36,000-114,000- 180,000 unit CpDR Take 4 capsules by mouth 3 (three) times daily with meals. Take 3 with snacks prn.    lisinopril 10 MG tablet Take 10 mg by mouth once daily.    LORazepam (ATIVAN) 1 MG tablet Take 1 mg by mouth every 6 (six) hours as needed for Anxiety.    magnesium oxide (MAG-OX) 400 mg tablet Take 1 tablet (400 mg total) by mouth 2 (two) times daily.    montelukast (SINGULAIR) 10 mg tablet Take 1 tablet (10 mg total) by mouth once daily.    morphine (MS CONTIN) 15 MG 12 hr tablet Take 15 mg by mouth 3 (three) times daily.    multivit,min52-folic-vitK-cQ10 (AQUADEKS) 100-700-10 mcg-mcg-mg Cap cap 1 tab twice daily    mycophenolate (CELLCEPT) 250 mg Cap Take 2 capsules (500 mg total) by mouth 2 (two) times daily.    omeprazole (PRILOSEC) 40 MG capsule TAKE 1 CAPSULE BY MOUTH EVERY MORNING    ondansetron (ZOFRAN) 8 MG tablet Take 1 tablet (8 mg total) by mouth every 8 (eight) hours as needed for Nausea.    oxycodone (ROXICODONE) 5 MG immediate release tablet Take 10 mg by mouth every 4 (four) hours as needed for Pain.    predniSONE (DELTASONE) 5 MG tablet Take 1 tablet (5 mg total) by mouth once daily.    PROCHAMBER USE AS DIRECTED    promethazine (PHENERGAN) 25 MG tablet Take 1 tablet (25 mg total) by mouth every 8 (eight) hours as needed.    tacrolimus (PROGRAF) 0.5 MG Cap Take 1 capsule (0.5 mg total) by mouth every 12 (twelve)  hours.    tacrolimus (PROGRAF) 1 MG Cap Take 3 capsules (3 mg total) by mouth every 12 (twelve) hours. Daily doses: 3.5 mg every 12 hours.    tiZANidine (ZANAFLEX) 4 MG tablet TAKE 2 TABLETS BY MOUTH EVERY 6 HOURS AS NEEDED    topiramate (TOPAMAX) 25 MG tablet Take 1 tablet (25 mg total) by mouth 2 (two) times daily.    amlodipine (NORVASC) 5 MG tablet Take 10 mg by mouth once daily.     diphenhydrAMINE (BENADRYL) 50 MG tablet Take 1 tablet (50 mg total) by mouth every 6 (six) hours as needed for Itching.    mirtazapine (REMERON) 30 MG tablet Take 30 mg by mouth every evening.    polyethylene glycol (GLYCOLAX) 17 gram PwPk Take 17 g by mouth 2 (two) times daily.     Family History     Problem Relation (Age of Onset)    Drug abuse Maternal Grandfather    Thrombocytopenia Maternal Grandfather        Tobacco Use    Smoking status: Never Smoker    Smokeless tobacco: Never Used   Substance and Sexual Activity    Alcohol use: No     Comment: Has not had an alcoholic drink in more than 2 months.    Drug use: No    Sexual activity: Yes     Partners: Male     Birth control/protection: Implant     Review of Systems   Constitutional: Positive for fatigue. Negative for appetite change, chills, diaphoresis and fever.   HENT: Positive for congestion (chronic, stable). Negative for postnasal drip, sore throat and trouble swallowing.    Respiratory: Positive for cough and shortness of breath.    Cardiovascular: Negative for chest pain, palpitations and leg swelling.   Gastrointestinal: Positive for abdominal distention. Negative for abdominal pain, constipation, diarrhea, nausea and vomiting.   Endocrine: Negative for polydipsia, polyphagia and polyuria.   Genitourinary: Negative for dysuria, frequency and urgency.   Musculoskeletal: Positive for myalgias. Negative for back pain, neck pain and neck stiffness.   Skin: Negative for color change, pallor and rash.   Neurological: Positive for headaches (chronic, well  controlled on current regimen). Negative for dizziness and light-headedness.   Psychiatric/Behavioral: Negative for agitation, behavioral problems and confusion. The patient is nervous/anxious.      Objective:     Vital Signs (Most Recent):  Temp: 98.4 °F (36.9 °C) (09/27/18 1430)  Pulse: 107 (09/27/18 1430)  Resp: 18 (09/27/18 1430)  BP: (!) 127/90 (09/27/18 1430)  SpO2: 95 % (09/27/18 1430) Vital Signs (24h Range):  Temp:  [96.5 °F (35.8 °C)-98.4 °F (36.9 °C)] 98.4 °F (36.9 °C)  Pulse:  [] 107  Resp:  [16-20] 18  SpO2:  [94 %-96 %] 95 %  BP: (104-127)/(76-90) 127/90        There is no height or weight on file to calculate BMI.    No intake or output data in the 24 hours ending 09/27/18 1559    Physical Exam   Constitutional: She is oriented to person, place, and time. She appears well-developed and well-nourished. She has a sickly appearance. No distress.   HENT:   Head: Normocephalic and atraumatic.   Right Ear: External ear normal.   Left Ear: External ear normal.   Nose: Nose normal.   Mouth/Throat: Oropharynx is clear and moist. No oropharyngeal exudate.   Eyes: Conjunctivae and EOM are normal.   Neck: Normal range of motion. Neck supple. No JVD present.   Cardiovascular: Normal rate, regular rhythm, normal heart sounds and intact distal pulses. Exam reveals no gallop and no friction rub.   No murmur heard.  Pulmonary/Chest: Effort normal. She has decreased breath sounds in the right middle field, the right lower field, the left middle field and the left lower field. She has no wheezes. She has no rhonchi. She has rales in the right lower field and the left lower field.   Abdominal: Soft. Bowel sounds are normal. She exhibits distension. There is no tenderness. There is no guarding.   Musculoskeletal: Normal range of motion. She exhibits no edema.   Lymphadenopathy:     She has no cervical adenopathy.   Neurological: She is alert and oriented to person, place, and time. No cranial nerve deficit.   Skin:  Skin is warm and dry. Capillary refill takes less than 2 seconds. No rash noted. She is not diaphoretic. No erythema. There is pallor.   Psychiatric: Her speech is normal and behavior is normal. Thought content normal. Her mood appears anxious. She exhibits a depressed mood.   Nursing note and vitals reviewed.      Significant Labs:  CBC:  No results for input(s): WBC, RBC, HGB, HCT, PLT, MCV, MCH, MCHC in the last 72 hours.  BMP:  No results for input(s): GLUCOSE, NA, K, CL, CO2, BUN, CREATININE, CALCIUM in the last 72 hours.     I have reviewed all pertinent labs within the past 24 hours.    Diagnostic Results:  Labs: Reviewed  X-Ray: Reviewed  CT: Reviewed    Assessment/Plan:     * Bronchiolitis obliterans syndrome    Concern for possible infectious etiology versus progression of CARLOS EDUARDO. Patient last received half pulse steroids x 4 doses in May 2018.   CT Chest 9/25 from [place] reviewed by Dr. Mae. Patient has recently received 2 weeks of IV Vancomycin and Meropenem at [place].     Will continue IV Vancomycin and Meropenem and consider possible OR bronchoscopy next week. RPP, respiratory culture, CXR PA and Lat pending. Patient currently with good oxygenation on room air at 95%. Patient wears 2-3L of oxygen PRN. Maintain O2 sats > 88%.        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     Will continue current immunosuppression and prophylaxis. Concern for possible progression of CARLOS EDUARDO. Patient last received empiric half pulse steroids in May 2018.         Immunosuppression    Current outpatient regimen: Tacrolimus 3.5 mg, Prednisone 5 mg daily, and  mg BID.     Will continue current outpatient regimen. Continue daily monitoring of tacrolimus levels and dose adjust accordingly.        Prophylactic antibiotic    Continue  Dapsone 100 mg daily for PCP/PJP prophylaxis.         Sinusitis, chronic    Symptomatically at baseline. Continue with Allegra, Singulair, and Flonase.             Jacqueline Dumont PA-C  Lung Transplant  [facility]

## 2018-09-28 PROBLEM — R93.89 ABNORMAL CHEST CT: Status: ACTIVE | Noted: 2018-01-01

## 2018-09-28 NOTE — PROGRESS NOTES
Admit Note     Met with patient to assess needs. Patient is a 28 y.o. single female, admitted for 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]. Pt is s/p lung txp.    Patient admitted from home on 9/27/2018 .  At this time, patient presents as alert and oriented x 4, pleasant, good eye contact, well groomed, recall good, concentration/judgement good, average intelligence, calm, communicative, cooperative and asking and answering questions appropriately.  At this time, patients caregiver also presents as alert and oriented x 4, pleasant, good eye contact, well groomed, recall good, concentration/judgement good, average intelligence, calm, communicative, cooperative and asking and answering questions appropriately.     Household/Family Systems     Patient resides with patient's significant other, at [address] [place] [address].   Support system includes pt's mother Kim Ibarra, aunt Mariel Calero, and s/o Shawn Castrejon.  Patient does not have dependents that are need of being cared for.     Patients primary caregivers are Kim Ibarra, patients mother, phone number [phone] and Shawn Castrejon, patient's boyfriend, phone number [phone].  Confirmed patients contact information is [phone] (home);     Telephone Information:   Mobile [phone]     During admission, patient's caregiver plans to stay in patient's room.  Confirmed patient and patients caregivers do have access to reliable transportation.    Cognitive Status/Learning     Patient reports reading ability as 8th grade and states patient does have difficulty with short-term memory.  Patient reports patient learns best by visual/auditory/hands on information.   Needed: No.   Highest education level: Grade School (0-8)    Vocation/Disability     Working for Income: No  If no, reason not working: Disability  Patient is disabled due to Cystic Fibrosis since 2004. Pt was taking classes part-time at [employer] in  [place] but dropped out due to illness. Pt reports that she does not plan on returning.     Adherence     Patient reports a high level of adherence to patients health care regimen.  Although there continues to be a concern from team that pt has a pain medication dependence. SW concerned that pt could be overusing.  Adherence counseling and education provided. Patient verbalizes understanding.    Substance Use    Patient reports the following substance usage.    Tobacco: none, patient denies any use.   Alcohol: none, patient denies any use.  Illicit Drugs/Non-prescribed Medications: none, patient denies any use. Please note that team concerned for some dependence on pain medications/anxiety medication. White in room pt asked for increased Ativan dose.   Patient states clear understanding of the potential impact of substance use.  Substance abstinence/cessation counseling, education and resources provided and reviewed.     Services Utilizing/ADLS    Infusion Service: Prior to admission, patient utilizing? no; however in May 2018 pt used [facility] [phone]/ fx [phone]   Home Health: Prior to admission, patient utilizing? no  DME: Prior to admission, no  Pulmonary/Cardiac Rehab: Prior to admission, no  Dialysis:  Prior to admission, no  Transplant Specialty Pharmacy:  Prior to admission, yes; Pt uses [facility] (mail-delivery) for transplant medications and [facility] at [address] [place] [address] for non-transplant medications.    Prior to admission, patient reports patient was independent with ADLS and was driving.  Patient reports patient is able to care for self at this time..  Patient indicates a willingness to care for self once medically cleared to do so.    Insurance/Medications    Insured by   Payor/Plan Subscr  Sex Relation Sub. Ins. ID Effective Group Num   1. MEDICAID - * [place]* 1990 Female  [id number] 2/1/15 [facility]                                   [address] [address]    Primary Insurance (for UNOS reporting): Public Insurance - [facility]  Secondary Insurance (for UNOS reporting): None    Patient reports patient is able to obtain and afford medications at this time and at time of discharge.      Living Will/Healthcare Power of     Patient states patient has a LW and/or HCPA on file. Pt HCPA is s/o Shawn Castrejon ([phone]).      Coping/Mental Health    Patient is coping adequately with the aid of  s/o and mother.   Patient has a history mental health difficulties that are currently being managed with medication (Abilify, Ativan, and Effexor). Pt's medications managed by PCP. Pt presents as stable at this time Team continues to monitor.    Discharge Planning    At time of discharge, patient plans to return to patient's home under the care of pt's mother or s/o.  Patients significant other and mother will transport patient.  Per rounds today, expected discharge date has not been medically determined at this time. Patient and patients caregiver  verbalize understanding and are involved in treatment planning and discharge process.    Additional Concerns    Patient is being followed for needs, education, resources, information, emotional support, supportive counseling, and for supportive and skilled discharge plan of care.  providing ongoing psychosocial support, education, resources and d/c planning as needed.  SW remains available.  provided resource list, patient choice, psychosocial and supportive counseling, resources, education, assistance and discharge planning with patient and caregiver involvement, ongoing SW availability and services as appropriate.

## 2018-09-28 NOTE — ASSESSMENT & PLAN NOTE
Concern for possible infectious etiology versus progression of CARLOS EDUARDO. Patient last received half pulse steroids x 4 doses in May 2018.   CT Chest 9/25 from [facility] reviewed by Dr. Mae. Patient has recently received 2 weeks of IV Vancomycin and Meropenem at [facility].     Will continue empiric IV Vancomycin and Meropenem and plan for OR bronchoscopy on Monday. RPP, respiratory culture ordered. CT chest from [facility] with new multi-focal opacities at the bilateral lung bases. Concerns for progression of CARLOS EDUARDO given her persistent symptoms despite adequate antibiotic coverage. Continue antibiotics for now. Maintain O2 sats > 88%.

## 2018-09-28 NOTE — PROGRESS NOTES
Pt refusing CXR at this time d/t increased pain and turned away transport. Oxy 10mg given and will re-assess ability to go down for XR. Will continue to monitor.

## 2018-09-28 NOTE — SUBJECTIVE & OBJECTIVE
Subjective:     Interval History: No acute events overnight. Patient remains afebrile on Vanc/Meropenem. Patient continues to have dry, hacking cough and fatigue unchanged with antibiotic therapy. No new complaints today.     Continuous Infusions:  Scheduled Meds:   amLODIPine  10 mg Oral Daily    ARIPiprazole  2 mg Oral Daily    calcium-vitamin D3  1 tablet Oral BID WM    dapsone  100 mg Oral Daily    diphenhydrAMINE  50 mg Intravenous Q12H    enoxaparin  40 mg Subcutaneous Daily    fexofenadine  180 mg Oral Daily    fluticasone  2 spray Each Nare Daily    gabapentin  1,100 mg Oral TID    levalbuterol  0.63 mg Nebulization Q8H WA    lipase-protease-amylase  4 capsule Oral TID WM    lisinopril  10 mg Oral Daily    magnesium oxide  400 mg Oral BID    meropenem (MERREM) IVPB  2 g Intravenous Q8H    montelukast  10 mg Oral Daily    morphine  15 mg Oral TID    multivit-min-FA-coenzyme Q10 100-5 mcg-mg  1 tablet Oral BID    mycophenolate  500 mg Oral BID    predniSONE  5 mg Oral Daily    tacrolimus  0.5 mg Oral BID    tacrolimus  3 mg Oral BID    topiramate  50 mg Oral BID    vancomycin 750 mg in dextrose 5 % 250 mL IVPB (ready to mix system)  750 mg Intravenous Q12H    venlafaxine  75 mg Oral QHS     PRN Meds:acetaminophen, butalbital-acetaminophen-caffeine -40 mg, diphenhydrAMINE, heparin, porcine (PF), ipratropium, LORazepam, ondansetron, oxyCODONE, polyethylene glycol, potassium chloride 10% **AND** potassium chloride 10% **AND** potassium chloride 10%, promethazine, tiZANidine    Review of patient's allergies indicates:   Allergen Reactions    Albuterol Palpitations    Colistin Anaphylaxis    Vancomycin analogues      Infusion reaction that does not resolve with slowing    Neupogen [filgrastim] Other (See Comments)     Ostealgia after five daily doses of 300 mcg.      Bactrim [sulfamethoxazole-trimethoprim] Hives    Ceftazidime Hives     Pt stated can tolerate cefapine not  ceftazidime    Ceftazidime     Dronabinol Other (See Comments)     Mental changes/hallucinations    Haldol [haloperidol lactate] Other (See Comments)     Seizure like activity    Nsaids (non-steroidal anti-inflammatory drug)      Cannot have due to lung transplant    Adhesive Rash     Cloth tape- please use tegaderm or paper tape    Aztreonam Rash    Ciprofloxacin Nausea And Vomiting     Projectile N/V, per patient.  Unwilling to retry therapy.       Review of Systems   Constitutional: Positive for fatigue. Negative for appetite change, chills, diaphoresis and fever.   HENT: Positive for congestion (chronic, stable). Negative for postnasal drip, sore throat and trouble swallowing.    Respiratory: Positive for cough and shortness of breath.    Cardiovascular: Negative for chest pain, palpitations and leg swelling.   Gastrointestinal: Negative for abdominal distention, abdominal pain, constipation, diarrhea, nausea and vomiting.   Endocrine: Negative for polydipsia, polyphagia and polyuria.   Genitourinary: Negative for dysuria, frequency and urgency.   Musculoskeletal: Positive for myalgias. Negative for back pain, neck pain and neck stiffness.   Skin: Negative for color change, pallor and rash.   Neurological: Negative for dizziness, light-headedness and headaches (chronic, well controlled on current regimen).   Psychiatric/Behavioral: Negative for agitation, behavioral problems and confusion. The patient is nervous/anxious.      Objective:   Physical Exam   Constitutional: She is oriented to person, place, and time. She appears well-developed and well-nourished. She has a sickly appearance. No distress.   HENT:   Head: Normocephalic and atraumatic.   Right Ear: External ear normal.   Left Ear: External ear normal.   Nose: Nose normal.   Mouth/Throat: Oropharynx is clear and moist. No oropharyngeal exudate.   Eyes: Conjunctivae and EOM are normal.   Neck: Normal range of motion. Neck supple. No JVD present.    Cardiovascular: Normal rate, regular rhythm, normal heart sounds and intact distal pulses. Exam reveals no gallop and no friction rub.   No murmur heard.  Pulmonary/Chest: Effort normal. She has decreased breath sounds in the right middle field, the right lower field, the left middle field and the left lower field. She has no wheezes. She has no rhonchi. She has no rales.   Abdominal: Soft. Bowel sounds are normal. She exhibits no distension. There is no tenderness. There is no guarding.   Musculoskeletal: Normal range of motion. She exhibits no edema.   Lymphadenopathy:     She has no cervical adenopathy.   Neurological: She is alert and oriented to person, place, and time. No cranial nerve deficit.   Skin: Skin is warm and dry. Capillary refill takes less than 2 seconds. No rash noted. She is not diaphoretic. No erythema. There is pallor.   Psychiatric: Her speech is normal and behavior is normal. Thought content normal. Her mood appears anxious. She exhibits a depressed mood.   Nursing note and vitals reviewed.        Vital Signs (Most Recent):  Temp: 98.7 °F (37.1 °C) (09/28/18 1615)  Pulse: 106 (09/28/18 1550)  Resp: 18 (09/28/18 1550)  BP: 113/72 (09/28/18 1059)  SpO2: (!) 94 % (09/28/18 1550) Vital Signs (24h Range):  Temp:  [98.1 °F (36.7 °C)-98.7 °F (37.1 °C)] 98.7 °F (37.1 °C)  Pulse:  [] 106  Resp:  [11-18] 18  SpO2:  [93 %-95 %] 94 %  BP: ()/(68-94) 113/72     Weight: 58.9 kg (129 lb 13.6 oz)  Body mass index is 24.54 kg/m².      Intake/Output Summary (Last 24 hours) at 9/28/2018 1652  Last data filed at 9/28/2018 1635  Gross per 24 hour   Intake 2450 ml   Output 3300 ml   Net -850 ml       Significant Labs:  CBC:  Recent Labs   Lab  09/28/18   0330   WBC  5.50   RBC  3.22*   HGB  8.0*   HCT  27.2*   PLT  166   MCV  85   MCH  24.8*   MCHC  29.4*     BMP:  Recent Labs   Lab  09/28/18   0330   NA  140   K  4.7   CL  108   CO2  27   BUN  14   CREATININE  1.1   CALCIUM  8.5*      Tacrolimus  Levels:  Recent Labs   Lab  09/28/18   0330   TACROLIMUS  8.3     Microbiology:  Microbiology Results (last 7 days)     Procedure Component Value Units Date/Time    Respiratory Viral Panel by PCR [facility]; Nasal Swab [[id number]]     Order Status:  No result Specimen:  Respiratory     Culture, Respiratory with Gram Stain [[id number]]     Order Status:  No result Specimen:  Respiratory from Sputum, Expectorated           I have reviewed all pertinent labs within the past 24 hours.    Diagnostic Results:  CT: Reviewed - increased, patchy opacities within bilateral lung bases.

## 2018-09-28 NOTE — ASSESSMENT & PLAN NOTE
CT chest from [place] with new multi-focal opacities at the bilateral lung bases. Concerns for progression of CARLOS EDUARDO vs infectious etiology. Plan for OR bronchoscopy on Monday. Continue empiric coverage for now.

## 2018-09-28 NOTE — ASSESSMENT & PLAN NOTE
Ms Ibarra presents with change in her baseline cough which is concerning for pneumonia or bronchiolitis obliterans    Recs  - awaiting BAL  - would send urine histoplasma and blastomyces, serum crypto antigen, serum fungitell and aspergillus antigen  - continue current coverage with vanc and meropenem

## 2018-09-28 NOTE — ASSESSMENT & PLAN NOTE
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     Will continue current immunosuppression and prophylaxis. Concerns for possible progression of CARLOS EDUARDO vs infectious etiology. Patient last received empiric half pulse steroids in May 2018. OR bronchoscopy scheduled for 10/1.

## 2018-09-28 NOTE — CONSULTS
Consult acknowledged and reviewed for SOB/cough in complex lung transplant patient. Formal note to follow.    Juanita Francis MD  Transplant ID Attending  [phone]

## 2018-09-28 NOTE — ANESTHESIA PREPROCEDURE EVALUATION
[facility]  Anesthesia Pre-Operative Evaluation         Patient Name: Juanita Ibarra  YOB: 1990  MRN: [id number]    SUBJECTIVE:     09/28/2018    Pre-operative evaluation for Procedure(s) (LRB):  BRONCHOSCOPY (N/A)    Juanita Ibarra is a 28 y.o. female with female with history of BLT secondary to CF. 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 Patient was transferred from [facility] in [place].  A Chest CT done at OSH showed new mild, small, multi-focal opacities in bilateral lower lobes, some with tree-in-bud pattern concerning for bronchitis.  Patient now presents for the above procedure(s).      LDA:   None documented.       Previous airway:   Easy mask ventilation, Alejandre, 6.0 ETT      Drips:   None documented.      Patient Active Problem List   Diagnosis    Cystic fibrosis with pulmonary manifestations    Sinusitis, chronic    Anxiety disorder    Other pain disorders related to psychological factors    Nausea    Protein calorie malnutrition    CF related Pancreatic insufficiency    Chronic pain with opiate use    Allergy to multiple antibiotics    Dysphagia, oropharyngeal    Chronic visceral pain (pleura)    Transaminitis    Gallstones    Hyperglycemia    Adverse effect of glucocorticoid or synthetic analogue    Immunosuppression    Lung transplant status, bilateral    Dysphonia    Constipation    Portacath in place    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    Prophylactic antibiotic    Complication of transplanted lung    Acute rejection of lung transplant    Debility    Muscle spasm of back    Chronic anemia    Headache    Aftercare following organ transplant    Leukopenia    SBO (small bowel obstruction)    Sterilization    Lung replaced by transplant    Fever    Cytomegalovirus  (CMV) viremia    Hospital acquired PNA    Hyperkalemia    Acute kidney injury    Other complications of lung transplant    S/P lung transplant    Bilious vomiting with nausea    Acute intractable headache    Pneumonia, organism unspecified(486)    Lymphadenopathy    Biliary colic    Unilateral complete vocal fold paralysis    Bronchiolitis obliterans syndrome    Chronic ethmoidal sinusitis    Mastoiditis    ETD (Eustachian tube dysfunction), bilateral    Dysmenorrhea    Sinus infection    Hypertension    Pneumonia    Current chronic use of systemic steroids    TMJ arthralgia       Review of patient's allergies indicates:   Allergen Reactions    Albuterol Palpitations    Colistin Anaphylaxis    Vancomycin analogues      Infusion reaction that does not resolve with slowing    Neupogen [filgrastim] Other (See Comments)     Ostealgia after five daily doses of 300 mcg.      Bactrim [sulfamethoxazole-trimethoprim] Hives    Ceftazidime Hives     Pt stated can tolerate cefapine not ceftazidime    Ceftazidime     Dronabinol Other (See Comments)     Mental changes/hallucinations    Haldol [haloperidol lactate] Other (See Comments)     Seizure like activity    Nsaids (non-steroidal anti-inflammatory drug)      Cannot have due to lung transplant    Adhesive Rash     Cloth tape- please use tegaderm or paper tape    Aztreonam Rash    Ciprofloxacin Nausea And Vomiting     Projectile N/V, per patient.  Unwilling to retry therapy.       Current Inpatient Medications:   amLODIPine  10 mg Oral Daily    ARIPiprazole  2 mg Oral Daily    calcium-vitamin D3  1 tablet Oral BID WM    dapsone  100 mg Oral Daily    diphenhydrAMINE  50 mg Intravenous Q12H    enoxaparin  40 mg Subcutaneous Daily    fexofenadine  180 mg Oral Daily    fluticasone  2 spray Each Nare Daily    gabapentin  1,100 mg Oral TID    levalbuterol  0.63 mg Nebulization Q8H WA    lipase-protease-amylase  4 capsule Oral TID WM     lisinopril  10 mg Oral Daily    magnesium oxide  400 mg Oral BID    meropenem (MERREM) IVPB  2 g Intravenous Q8H    montelukast  10 mg Oral Daily    morphine  15 mg Oral TID    multivit-min-FA-coenzyme Q10 100-5 mcg-mg  1 tablet Oral BID    mycophenolate  500 mg Oral BID    predniSONE  5 mg Oral Daily    tacrolimus  0.5 mg Oral BID    tacrolimus  3 mg Oral BID    topiramate  50 mg Oral BID    vancomycin 750 mg in dextrose 5 % 250 mL IVPB (ready to mix system)  750 mg Intravenous Q12H    venlafaxine  75 mg Oral QHS       No current facility-administered medications on file prior to encounter.      Current Outpatient Medications on File Prior to Encounter   Medication Sig Dispense Refill    aripiprazole (ABILIFY) 2 MG Tab Take 2 mg by mouth once daily.      butalbital-acetaminophen-caffeine -40 mg (FIORICET, ESGIC) -40 mg per tablet TAKE 1 TABLET BY MOUTH EVERY 6 HOURS AS NEEDED FOR HEADACHE 120 tablet 0    calcium-vitamin D3 500 mg(1,250mg) -200 unit per tablet Take 1 tablet by mouth 2 (two) times daily with meals. 60 tablet 11    dapsone 100 MG Tab Take 1 tablet (100 mg total) by mouth once daily. 30 tablet 11    DULERA 100-5 mcg/actuation HFAA INHALE 1 PUFF BY MOUTH TWICE DAILY 13 g 0    fexofenadine (ALLEGRA) 180 MG tablet Take 180 mg by mouth once daily.      fluconazole (DIFLUCAN) 150 MG Tab TAKE 1 TABLET BY MOUTH EVERY WEEK 30 tablet 0    fluticasone (FLONASE) 50 mcg/actuation nasal spray INSERT 2 SPRAYS IN EACH NOSTRIL DAILY 16 g 5    folic acid (FOLVITE) 1 MG tablet Take 1 tablet (1,000 mcg total) by mouth once daily. 30 tablet 11    gabapentin (NEURONTIN) 300 MG capsule Take 1 capsule (300 mg total) by mouth 3 (three) times daily. (Patient taking differently: Take 1,100 mg by mouth 3 (three) times daily. ) 90 capsule 11    levalbuterol (XOPENEX HFA) 45 mcg/actuation inhaler INHALE 1 TO 2 PUFFS INTO THE LUNGS EVERY 6 HOURS AS NEEDED FOR WHEEZING OR SHORTNESS OF BREATH. USE  WITH SPACER. 15 g 0    lipase-protease-amylase (CREON) 36,000-114,000- 180,000 unit CpDR Take 4 capsules by mouth 3 (three) times daily with meals. Take 3 with snacks prn. 800 capsule 11    lisinopril 10 MG tablet Take 10 mg by mouth once daily.      LORazepam (ATIVAN) 1 MG tablet Take 1 mg by mouth every 6 (six) hours as needed for Anxiety.      magnesium oxide (MAG-OX) 400 mg tablet Take 1 tablet (400 mg total) by mouth 2 (two) times daily. 60 tablet 11    montelukast (SINGULAIR) 10 mg tablet Take 1 tablet (10 mg total) by mouth once daily. 30 tablet 11    morphine (MS CONTIN) 15 MG 12 hr tablet Take 15 mg by mouth 3 (three) times daily.      multivit,min52-folic-vitK-cQ10 (AQUADEKS) 100-700-10 mcg-mcg-mg Cap cap 1 tab twice daily 60 capsule 0    mycophenolate (CELLCEPT) 250 mg Cap Take 2 capsules (500 mg total) by mouth 2 (two) times daily. 120 capsule 11    omeprazole (PRILOSEC) 40 MG capsule TAKE 1 CAPSULE BY MOUTH EVERY MORNING 30 capsule 11    ondansetron (ZOFRAN) 8 MG tablet Take 1 tablet (8 mg total) by mouth every 8 (eight) hours as needed for Nausea. 30 tablet 0    oxycodone (ROXICODONE) 5 MG immediate release tablet Take 10 mg by mouth every 4 (four) hours as needed for Pain.      predniSONE (DELTASONE) 5 MG tablet Take 1 tablet (5 mg total) by mouth once daily. 30 tablet 11    PROCHAMBER USE AS DIRECTED 1 Device 0    promethazine (PHENERGAN) 25 MG tablet Take 1 tablet (25 mg total) by mouth every 8 (eight) hours as needed. 60 tablet 0    tacrolimus (PROGRAF) 0.5 MG Cap Take 1 capsule (0.5 mg total) by mouth every 12 (twelve) hours. 60 capsule 11    tacrolimus (PROGRAF) 1 MG Cap Take 3 capsules (3 mg total) by mouth every 12 (twelve) hours. Daily doses: 3.5 mg every 12 hours. 180 capsule 11    tiZANidine (ZANAFLEX) 4 MG tablet TAKE 2 TABLETS BY MOUTH EVERY 6 HOURS AS NEEDED 90 tablet 0    topiramate (TOPAMAX) 25 MG tablet Take 1 tablet (25 mg total) by mouth 2 (two) times daily. 60  tablet 11    amlodipine (NORVASC) 5 MG tablet Take 10 mg by mouth once daily.       diphenhydrAMINE (BENADRYL) 50 MG tablet Take 1 tablet (50 mg total) by mouth every 6 (six) hours as needed for Itching. 1 tablet 0    mirtazapine (REMERON) 30 MG tablet Take 30 mg by mouth every evening.      polyethylene glycol (GLYCOLAX) 17 gram PwPk Take 17 g by mouth 2 (two) times daily. 60 packet 11       Past Surgical History:   Procedure Laterality Date    ABDOMINAL SURGERY      peg tube     [id number] N/A 5/19/2015    Performed by [facility]y Dias [facility] at [facility] OR    BIOPSY-BRONCHUS N/A 12/20/2016    Performed by Jake Alvarez MD at [facility] OR 2ND FLR    BRONCHOSCOPY N/A 5/29/2018    Procedure: BRONCHOSCOPY; Bronchoscopy with BAL and transbronchial biopsies under general anesthesia;  Surgeon: Jake Alvarez MD;  Location: [facility] OR [address] [place] [address];  Service: Transplant;  Laterality: N/A;    BRONCHOSCOPY Bilateral 12/20/2016    Performed by Jake Alvarez MD at [facility] OR 2ND FLR    BRONCHOSCOPY - flexible bronchoscopy with probable tissue biopsy CPT 68762 N/A 7/21/2015    Performed by Jake Alvarez MD at [facility] OR 2ND FLR    BRONCHOSCOPY - flexible bronchoscopy with probable tissue biospy CPT 22490 N/A 10/20/2015    Performed by Jake Alvarez MD at [facility] OR 2ND FLR    BRONCHOSCOPY - flexible bronchoscopy with tissue biopsy CPT 13691 N/A 9/29/2015    Performed by Jake Alvarez MD at [facility] OR 2ND FLR    BRONCHOSCOPY - flexible bronchoscopy with tissue biopsy CPT 67998 N/A 5/26/2015    Performed by Jake Alvarez MD at [facility] OR 1ST FLR    BRONCHOSCOPY flexible bronchoscopy with tissue biopsy N/A 9/8/2014    Performed by Jake Alvarez MD at [facility] OR 2ND FLR    BRONCHOSCOPY flexible with possible tissue biopsy N/A 8/8/2014    Performed by Jake Alvarez MD at [facility] OR 2ND FLR    BRONCHOSCOPY, BAL, BIOPSIES N/A 3/20/2018    Performed by Jake Alvarez MD at [facility] OR [facility]    BRONCHOSCOPY-FIBEROPTIC N/A 1/29/2016    Performed by Joann Cifuentes DO at [facility] OR [address] [place] [address]    BRONCHOSCOPY; Bronchoscopy with BAL and transbronchial biopsies under general anesthesia N/A 5/29/2018    [facility]    CHOLECYSTECTOMY  2016    CHOLECYSTECTOMY-LAPAROSCOPIC N/A 7/22/2016    Performed by Joshua Goldberg, MD at [facility] OR [address] [place] [address]    ENDOMETRIAL ABLATION  2015    KJB    FESS      FESS Bilateral 10/21/2013    Performed by Jared Whatley MD at [facility] OR [address] [place] [address]    FINE NEEDLE ASPIRATION (FNA)  of a cervical lymphadenopathy  1/29/2016    Performed by Joann Cifuentes DO at [facility] OR [address] [place] [address]    flex bronchoscopy with probable tissue biopsy N/A 7/31/2014    Performed by [facility] Diagnostic Provider at [facility] OR [address] [place] [address]    flexible bronchoscopy with tissue biopsy N/A 12/11/2014    Performed by Jake Alvarez MD at [facility] OR [address] [place] [address]    INJECTION-VOCAL CORD Left 6/24/2014    Performed by Jared Whatley MD at [facility] OR [address] [place] [address]    [id number] to right chest and removal of portacath to left chests wall. Bilateral 6/24/2014    Performed by Zhen Dorado MD at [facility] OR [address] [place] [address]    LARYNX SURGERY  2016    LUNG TRANSPLANT Bilateral 6/12/14    MYRINGOTOMY W/ TUBES Right 04/2017    MYRINGOTOMY WITH INSERTION OF PE TUBES Right 4/15/2017    [facility]    PLACEMENT-TUBE-PEG  5/15/2014    [facility]    PORTACATH PLACEMENT Right     rt sc    REMOVAL-TUBE-PEG  5/15/2014    Performed by David Moses MD at [facility] OR [address] [place] [address]    ROBOTIC ASSISTED LAPAROSCOPIC HYSTERECTOMY N/A 4/25/2017    Performed by Deny Dias Jr., MD at [facility] [place]    SALPINGECTOMY Bilateral 2015    KJB    SALPINGECTOMY-LAPAROSCOPIC Bilateral 5/19/2015    Performed by Deny Dias Jr., MD at [facility] [place]    SINUS SURGERY FUNCTIONAL ENDOSCOPIC WITH NAVIGATION Bilateral 4/3/2017    Performed by  Cesar Olsen MD at [facility] OR [address] [place] [address]    SINUS SURGERY FUNCTIONAL ENDOSCOPIC WITH NAVIGATION, 24976, 25579, 66636, 33409 Bilateral 2/5/2015    Performed by Cesar Olsen MD at [facility] OR [address] [place] [address]    THYROPLASTY - Medialization laryngoplasty, cricothyroid subluxation, arytenoid repositioning Left 8/2/2016    Performed by Gary Null MD at [facility] OR [address] [place] [address]    TRANSPLANT-LUNG Bilateral 6/11/2014    Performed by Adolfo Huston MD at [facility] OR [address] [place] [address]       Social History     Socioeconomic History    Marital status: Single     Spouse name: Not on file    Number of children: Not on file    Years of education: Not on file    Highest education level: Not on file   Social Needs    Financial resource strain: Not on file    Food insecurity - worry: Not on file    Food insecurity - inability: Not on file    Transportation needs - medical: Not on file    Transportation needs - non-medical: Not on file   Occupational History    Not on file   Tobacco Use    Smoking status: Never Smoker    Smokeless tobacco: Never Used   Substance and Sexual Activity    Alcohol use: No     Comment: Has not had an alcoholic drink in more than 2 months.    Drug use: No    Sexual activity: Yes     Partners: Male     Birth control/protection: Implant   Other Topics Concern    Not on file   Social History Narrative    Not on file       OBJECTIVE:     Vital Signs Range (Last 24H):  Temp:  [36.7 °C (98.1 °F)-36.9 °C (98.5 °F)]   Pulse:  []   Resp:  [11-18]   BP: ()/(68-94)   SpO2:  [93 %-95 %]       CBC:   WBC   Date Value Ref Range Status   09/28/2018 5.50 3.90 - 12.70 K/uL Final   09/27/2018 4.93 3.90 - 12.70 K/uL Final     RBC   Date Value Ref Range Status   09/28/2018 3.22 (L) 4.00 - 5.40 M/uL Final   09/27/2018 3.00 (L) 4.00 - 5.40 M/uL Final     Hemoglobin   Date Value Ref Range Status   09/28/2018 8.0 (L) 12.0 - 16.0 g/dL Final   09/27/2018 7.4 (L) 12.0 - 16.0 g/dL Final     POC Hematocrit   Date Value Ref  Range Status   06/12/2014 31 (L) 36 - 54 %PCV Final   06/12/2014 25 (L) 36 - 54 %PCV Final     Hematocrit   Date Value Ref Range Status   09/28/2018 27.2 (L) 37.0 - 48.5 % Final   09/27/2018 25.9 (L) 37.0 - 48.5 % Final     Platelets   Date Value Ref Range Status   09/28/2018 166 150 - 350 K/uL Final   09/27/2018 153 150 - 350 K/uL Final     MCV   Date Value Ref Range Status   09/28/2018 85 82 - 98 fL Final   09/27/2018 86 82 - 98 fL Final     MCH   Date Value Ref Range Status   09/28/2018 24.8 (L) 27.0 - 31.0 pg Final   09/27/2018 24.7 (L) 27.0 - 31.0 pg Final     MCHC   Date Value Ref Range Status   09/28/2018 29.4 (L) 32.0 - 36.0 g/dL Final   09/27/2018 28.6 (L) 32.0 - 36.0 g/dL Final       CMP:   Sodium   Date Value Ref Range Status   09/28/2018 140 136 - 145 mmol/L Final   06/18/2018 139 136 - 145 mmol/L Final     Potassium   Date Value Ref Range Status   09/28/2018 4.7 3.5 - 5.1 mmol/L Final   06/18/2018 4.6 3.5 - 5.1 mmol/L Final     Chloride   Date Value Ref Range Status   09/28/2018 108 95 - 110 mmol/L Final   06/18/2018 111 (H) 95 - 110 mmol/L Final     CO2   Date Value Ref Range Status   09/28/2018 27 23 - 29 mmol/L Final   06/18/2018 19 (L) 23 - 29 mmol/L Final     BUN, Bld   Date Value Ref Range Status   09/28/2018 14 6 - 20 mg/dL Final   06/18/2018 16 6 - 20 mg/dL Final     Creatinine   Date Value Ref Range Status   09/28/2018 1.1 0.5 - 1.4 mg/dL Final   06/18/2018 1.1 0.5 - 1.4 mg/dL Final     Glucose   Date Value Ref Range Status   09/28/2018 87 70 - 110 mg/dL Final   06/18/2018 119 (H) 70 - 110 mg/dL Final     Magnesium   Date Value Ref Range Status   09/28/2018 1.9 1.6 - 2.6 mg/dL Final   06/18/2018 1.9 1.6 - 2.6 mg/dL Final     Phosphorus   Date Value Ref Range Status   07/18/2015 3.2 2.7 - 4.5 mg/dL Final   08/14/2014 1.7 (L) 2.7 - 4.5 mg/dL Final     Calcium   Date Value Ref Range Status   09/28/2018 8.5 (L) 8.7 - 10.5 mg/dL Final   06/18/2018 9.1 8.7 - 10.5 mg/dL Final     Albumin   Date Value  Ref Range Status   09/28/2018 3.2 (L) 3.5 - 5.2 g/dL Final   06/18/2018 4.0 3.5 - 5.2 g/dL Final     Total Protein   Date Value Ref Range Status   09/28/2018 6.3 6.0 - 8.4 g/dL Final   06/18/2018 7.2 6.0 - 8.4 g/dL Final     Alkaline Phosphatase   Date Value Ref Range Status   09/28/2018 150 (H) 55 - 135 U/L Final   06/18/2018 111 55 - 135 U/L Final     ALT   Date Value Ref Range Status   09/28/2018 21 10 - 44 U/L Final   06/18/2018 16 10 - 44 U/L Final     AST   Date Value Ref Range Status   09/28/2018 19 10 - 40 U/L Final   06/18/2018 12 10 - 40 U/L Final     Total Bilirubin   Date Value Ref Range Status   09/28/2018 0.3 0.1 - 1.0 mg/dL Final     Comment:     For infants and newborns, interpretation of results should be based  on gestational age, weight and in agreement with clinical  observations.  Premature Infant recommended reference ranges:  Up to 24 hours.............<8.0 mg/dL  Up to 48 hours............<12.0 mg/dL  3-5 days..................<15.0 mg/dL  6-29 days.................<15.0 mg/dL     06/18/2018 0.4 0.1 - 1.0 mg/dL Final     Comment:     For infants and newborns, interpretation of results should be based  on gestational age, weight and in agreement with clinical  observations.  Premature Infant recommended reference ranges:  Up to 24 hours.............<8.0 mg/dL  Up to 48 hours............<12.0 mg/dL  3-5 days..................<15.0 mg/dL  6-29 days.................<15.0 mg/dL         INR:  INR   Date Value Ref Range Status   10/26/2015 1.0 0.8 - 1.2 Final     Comment:     Coumadin Therapy:  2.0 - 3.0 for INR for all indicators except mechanical heart valves  and antiphospholipid syndromes which should use 2.5 - 3.5.     07/18/2015 1.1 0.8 - 1.2 Final     Comment:     Coumadin Therapy:  2.0 - 3.0 for INR for all indicators except mechanical heart valves  and antiphospholipid syndromes which should use 2.5 - 3.5.     03/06/2015 1.0 0.8 - 1.2 Final     Comment:     Coumadin Therapy:  2.0 - 3.0 for  INR for all indicators except mechanical heart valves  and antiphospholipid syndromes which should use 2.5 - 3.5.       aPTT   Date Value Ref Range Status   02/02/2015 23.6 21.0 - 32.0 sec Final     Comment:     aPTT therapeutic range = 39-69 seconds   06/13/2014 26.9 21.0 - 32.0 sec Final     Comment:     aPTT therapuetic range = 39-69 seconds   06/11/2014 31.5 21.0 - 32.0 sec Final     Comment:     aPTT therapuetic range = 39-69 seconds       Diagnostic Studies:     EKG:   Vent. Rate : 115 BPM     Atrial Rate : 394 BPM     P-R Int : 000 ms          QRS Dur : 084 ms      QT Int : 320 ms       P-R-T Axes : 071 072 133 degrees     QTc Int : 442 ms    Atrial flutter  T wave abnormality, consider anterolateral ischemia  Nonspecific ST abnormality  Abnormal ECG  When compared with ECG of 22-JUL-2016 20:18,  Non-specific change in ST segment in Anterior leads and inferior leads  T wave inversion now evident in Anterior-lateral leads  Confirmed by Chucky ARIZA, Doyle Burkett ([id number]) on       ASSESSMENT/PLAN:       Anesthesia Evaluation    I have reviewed the Patient Summary Reports.    I have reviewed the Nursing Notes.   I have reviewed the Medications.   Prednisone    Review of Systems  Anesthesia Hx:  Hx of Anesthetic complications PONV and pruritis History of prior surgery of interest to airway management or planning: (laryngeal reconstruction) lung surgery. Previous anesthesia: General Airway issues documented on chart review include GETA  Denies Family Hx of Anesthesia complications.  Personal Hx of Anesthesia complications (post-op pruritis ), Post-Operative Nausea/Vomiting (no nausea with TIVA and prophylaxtic meds), with every anesthetic, despite treatment   Social:  Non-Smoker    Hematology/Oncology:         -- Anemia (chronic anemia):   EENT/Dental:   chronic allergic rhinitis Laryngeal reconstruction August 3, 2016 (vocal paralysis after transplant suirgery)   Otitis Media   Cardiovascular:   Hypertension,  well controlled    Pulmonary:   Pneumonia Shortness of breath Cystic Fibrosis  S/P Bilateral Lung Transplant June 2014   Renal/:   Chronic Renal Disease    Hepatic/GI:   GERD, well controlled Pancreatic insufficiency as result of CF   Musculoskeletal:   chronic back pain as result of muscular injury and rib fractures as result of excessive coughing    Neurological:   Headaches Seizures (years ago, once, drug side effect)   Chronic Pain Syndrome   Endocrine:   Diabetes, well controlled    Psych:   Psychiatric History anxiety          Physical Exam  General:  Well nourished Moon facies    Airway/Jaw/Neck:  Airway Findings: Mouth Opening: Small, but > 3cm Tongue: Normal  General Airway Assessment: Adult  S/p laryngeal reconstruction  5.5 ETT  Mallampati: III  Improves to II with phonation.  TM Distance: 4 - 6 cm  Jaw/Neck Findings:  Neck ROM: Normal ROM  Neck Findings: Normal    Eyes/Ears/Nose:  EYES/EARS/NOSE FINDINGS: Normal   Dental:  Dental Findings: In tact, Periodontal disease, Mild   Chest/Lungs:  Chest/Lungs Clear    Heart/Vascular:  Heart Findings: Normal Heart murmur: negative Vascular Findings: Normal    Abdomen:  Abdomen Findings: Normal    Musculoskeletal:  Musculoskeletal Findings: Normal   Skin:  Skin Findings: Normal    Mental Status:  Mental Status Findings: Normal        Anesthesia Plan  Type of Anesthesia, risks & benefits discussed:  Anesthesia Type:  MAC, general  Patient's Preference:   Intra-op Monitoring Plan: standard ASA monitors  Intra-op Monitoring Plan Comments:   Post Op Pain Control Plan: multimodal analgesia, IV/PO Opioids PRN and per primary service following discharge from PACU  Post Op Pain Control Plan Comments:   Induction:   IV  Beta Blocker:  Patient is not currently on a Beta-Blocker (No further documentation required).       Informed Consent: Patient understands risks and agrees with Anesthesia plan.  Questions answered. Anesthesia consent signed with patient.  ASA Score: 3      Day of Surgery Review of History & Physical:    H&P update referred to the surgeon.         Ready For Surgery From Anesthesia Perspective.

## 2018-09-28 NOTE — PROGRESS NOTES
[facility]  Lung Transplant  Progress Note - Floor    Patient Name: Juanita Ibarra  MRN: [id number]  Admission Date: 9/27/2018  Hospital Length of Stay: 1 days  Post-Operative Day: 1569  Attending Physician: Wiliam Mae MD  Primary Care Provider: Jake Alvarez MD     Subjective:     Interval History: No acute events overnight. Patient remains afebrile on Vanc/Meropenem. Patient continues to have dry, hacking cough and fatigue unchanged with antibiotic therapy. No new complaints today.     Continuous Infusions:  Scheduled Meds:   amLODIPine  10 mg Oral Daily    ARIPiprazole  2 mg Oral Daily    calcium-vitamin D3  1 tablet Oral BID WM    dapsone  100 mg Oral Daily    diphenhydrAMINE  50 mg Intravenous Q12H    enoxaparin  40 mg Subcutaneous Daily    fexofenadine  180 mg Oral Daily    fluticasone  2 spray Each Nare Daily    gabapentin  1,100 mg Oral TID    levalbuterol  0.63 mg Nebulization Q8H WA    lipase-protease-amylase  4 capsule Oral TID WM    lisinopril  10 mg Oral Daily    magnesium oxide  400 mg Oral BID    meropenem (MERREM) IVPB  2 g Intravenous Q8H    montelukast  10 mg Oral Daily    morphine  15 mg Oral TID    multivit-min-FA-coenzyme Q10 100-5 mcg-mg  1 tablet Oral BID    mycophenolate  500 mg Oral BID    predniSONE  5 mg Oral Daily    tacrolimus  0.5 mg Oral BID    tacrolimus  3 mg Oral BID    topiramate  50 mg Oral BID    vancomycin 750 mg in dextrose 5 % 250 mL IVPB (ready to mix system)  750 mg Intravenous Q12H    venlafaxine  75 mg Oral QHS     PRN Meds:acetaminophen, butalbital-acetaminophen-caffeine -40 mg, diphenhydrAMINE, heparin, porcine (PF), ipratropium, LORazepam, ondansetron, oxyCODONE, polyethylene glycol, potassium chloride 10% **AND** potassium chloride 10% **AND** potassium chloride 10%, promethazine, tiZANidine    Review of patient's allergies indicates:   Allergen Reactions    Albuterol Palpitations    Colistin  Anaphylaxis    Vancomycin analogues      Infusion reaction that does not resolve with slowing    Neupogen [filgrastim] Other (See Comments)     Ostealgia after five daily doses of 300 mcg.      Bactrim [sulfamethoxazole-trimethoprim] Hives    Ceftazidime Hives     Pt stated can tolerate cefapine not ceftazidime    Ceftazidime     Dronabinol Other (See Comments)     Mental changes/hallucinations    Haldol [haloperidol lactate] Other (See Comments)     Seizure like activity    Nsaids (non-steroidal anti-inflammatory drug)      Cannot have due to lung transplant    Adhesive Rash     Cloth tape- please use tegaderm or paper tape    Aztreonam Rash    Ciprofloxacin Nausea And Vomiting     Projectile N/V, per patient.  Unwilling to retry therapy.       Review of Systems   Constitutional: Positive for fatigue. Negative for appetite change, chills, diaphoresis and fever.   HENT: Positive for congestion (chronic, stable). Negative for postnasal drip, sore throat and trouble swallowing.    Respiratory: Positive for cough and shortness of breath.    Cardiovascular: Negative for chest pain, palpitations and leg swelling.   Gastrointestinal: Negative for abdominal distention, abdominal pain, constipation, diarrhea, nausea and vomiting.   Endocrine: Negative for polydipsia, polyphagia and polyuria.   Genitourinary: Negative for dysuria, frequency and urgency.   Musculoskeletal: Positive for myalgias. Negative for back pain, neck pain and neck stiffness.   Skin: Negative for color change, pallor and rash.   Neurological: Negative for dizziness, light-headedness and headaches (chronic, well controlled on current regimen).   Psychiatric/Behavioral: Negative for agitation, behavioral problems and confusion. The patient is nervous/anxious.      Objective:   Physical Exam   Constitutional: She is oriented to person, place, and time. She appears well-developed and well-nourished. She has a sickly appearance. No distress.    HENT:   Head: Normocephalic and atraumatic.   Right Ear: External ear normal.   Left Ear: External ear normal.   Nose: Nose normal.   Mouth/Throat: Oropharynx is clear and moist. No oropharyngeal exudate.   Eyes: Conjunctivae and EOM are normal.   Neck: Normal range of motion. Neck supple. No JVD present.   Cardiovascular: Normal rate, regular rhythm, normal heart sounds and intact distal pulses. Exam reveals no gallop and no friction rub.   No murmur heard.  Pulmonary/Chest: Effort normal. She has decreased breath sounds in the right middle field, the right lower field, the left middle field and the left lower field. She has no wheezes. She has no rhonchi. She has no rales.   Abdominal: Soft. Bowel sounds are normal. She exhibits no distension. There is no tenderness. There is no guarding.   Musculoskeletal: Normal range of motion. She exhibits no edema.   Lymphadenopathy:     She has no cervical adenopathy.   Neurological: She is alert and oriented to person, place, and time. No cranial nerve deficit.   Skin: Skin is warm and dry. Capillary refill takes less than 2 seconds. No rash noted. She is not diaphoretic. No erythema. There is pallor.   Psychiatric: Her speech is normal and behavior is normal. Thought content normal. Her mood appears anxious. She exhibits a depressed mood.   Nursing note and vitals reviewed.        Vital Signs (Most Recent):  Temp: 98.7 °F (37.1 °C) (09/28/18 1615)  Pulse: 106 (09/28/18 1550)  Resp: 18 (09/28/18 1550)  BP: 113/72 (09/28/18 1059)  SpO2: (!) 94 % (09/28/18 1550) Vital Signs (24h Range):  Temp:  [98.1 °F (36.7 °C)-98.7 °F (37.1 °C)] 98.7 °F (37.1 °C)  Pulse:  [] 106  Resp:  [11-18] 18  SpO2:  [93 %-95 %] 94 %  BP: ()/(68-94) 113/72     Weight: 58.9 kg (129 lb 13.6 oz)  Body mass index is 24.54 kg/m².      Intake/Output Summary (Last 24 hours) at 9/28/2018 1652  Last data filed at 9/28/2018 1635  Gross per 24 hour   Intake 2450 ml   Output 3300 ml   Net -850 ml        Significant Labs:  CBC:  Recent Labs   Lab  09/28/18   0330   WBC  5.50   RBC  3.22*   HGB  8.0*   HCT  27.2*   PLT  166   MCV  85   MCH  24.8*   MCHC  29.4*     BMP:  Recent Labs   Lab  09/28/18   0330   NA  140   K  4.7   CL  108   CO2  27   BUN  14   CREATININE  1.1   CALCIUM  8.5*      Tacrolimus Levels:  Recent Labs   Lab  09/28/18   0330   TACROLIMUS  8.3     Microbiology:  Microbiology Results (last 7 days)     Procedure Component Value Units Date/Time    Respiratory Viral Panel by PCR [facility]; Nasal Swab [[id number]]     Order Status:  No result Specimen:  Respiratory     Culture, Respiratory with Gram Stain [[id number]]     Order Status:  No result Specimen:  Respiratory from Sputum, Expectorated           I have reviewed all pertinent labs within the past 24 hours.    Diagnostic Results:  CT: Reviewed - increased, patchy opacities within bilateral lung bases.       Assessment/Plan:     * Bronchiolitis obliterans syndrome    Concern for possible infectious etiology versus progression of CARLOS EDUARDO. Patient last received half pulse steroids x 4 doses in May 2018.   CT Chest 9/25 from [facility] reviewed by Dr. Mae. Patient has recently received 2 weeks of IV Vancomycin and Meropenem at [facility].     Will continue empiric IV Vancomycin and Meropenem and plan for OR bronchoscopy on Monday. RPP, respiratory culture ordered. CT chest from [facility] with new multi-focal opacities at the bilateral lung bases. Concerns for progression of CARLOS EDUARDO given her persistent symptoms despite adequate antibiotic coverage. Continue antibiotics for now. Maintain O2 sats > 88%.        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     Will continue current immunosuppression and prophylaxis. Concerns for possible progression of CARLOS EDUARDO vs infectious etiology.  Patient last received empiric half pulse steroids in May 2018. OR bronchoscopy scheduled for 10/1.         Prophylactic antibiotic    Continue Dapsone 100 mg daily for PCP/PJP prophylaxis.         Immunosuppression    Current outpatient regimen: Tacrolimus 3.5 mg, Prednisone 5 mg daily, and  mg BID.     Will continue current outpatient regimen. Continue daily monitoring of tacrolimus levels and dose adjust accordingly.        Sinusitis, chronic    Symptomatically at baseline. Continue with Allegra, Singulair, and Flonase.         Abnormal chest CT    CT chest from [facility] with new multi-focal opacities at the bilateral lung bases. Concerns for progression of CARLOS EDUARDO vs infectious etiology. Plan for OR bronchoscopy on Monday. Continue empiric coverage for now.             Shama Canales PA-C  Lung Transplant  [facility]

## 2018-09-28 NOTE — NURSING
Pt admitted to unit this afternoon, VSS, afebrile, c/o back and chest pain upon arrival, prn pain meds given.  Independent, AAO,  at bedside. Visi monitor connected to pt. Will continue to monitor.

## 2018-09-28 NOTE — CONSULTS
[facility]  Infectious Disease  Consult Note    Patient Name: Juanita Ibarra  MRN: [id number]  Admission Date: 9/27/2018  Hospital Length of Stay: 1 days  Attending Physician: Wiliam Mae MD  Primary Care Provider: Jake Alvarez MD     Isolation Status: No active isolations    Patient information was obtained from patient, past medical records and ER records.      Consults  Assessment/Plan:     Pneumonia    Ms Ibarra presents with change in her baseline cough which is concerning for pneumonia or bronchiolitis obliterans    Recs  - awaiting BAL  - would send urine histoplasma and blastomyces, serum crypto antigen, serum fungitell and aspergillus antigen  - continue current coverage with vanc and meropenem             Thank you for your consult. I will follow-up with patient. Please contact us if you have any additional questions.    Wallace Garzon MD  Infectious Disease  [facility]    Subjective:     Principal Problem: Bronchiolitis obliterans syndrome    HPI: Mrs. Gisel Ibarra is a 27 yo female with history of lung transplant 2014 secondary to CF. Infectious history includes, rejection 8/2014, recurrent C glabrata positive sputum, pseudomonas pneumonia in 02/2015 resolved, CMV viremia 7/2015.     Prior to this current admission she was admitted to an OSH at [facility] between 8/22 to 9/16 for sinusitis (had green nasal discharge) for which she was treated with IV Vancomycin and Meropenem. Patient noted symptomatic improvement and was subsequently discharged home. Spent about 1 week at home post discharge, then patient began to note a dry cough followed by occasional production of thick yellow sputum. Returned to the OSH at [facility] and was transferred here.     Still has a dry cough that is worse that her usual mild non prod cough. 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       Past Surgical History:   Procedure Laterality Date    ABDOMINAL SURGERY      peg tube     [id number] N/A 5/19/2015    Performed by Deny Dias Jr[facility] OR    BIOPSY-BRONCHUS N/A 12/20/2016    Performed by Jake Alvarez MD at [facility] OR 2ND FLR    BRONCHOSCOPY N/A 5/29/2018    Procedure: BRONCHOSCOPY; Bronchoscopy with BAL and transbronchial biopsies under general anesthesia;  Surgeon: Jake Alvarez MD;  Location: [facility] OR [address] [place] [address];  Service: Transplant;  Laterality: N/A;    BRONCHOSCOPY Bilateral 12/20/2016    Performed by Jake Alvarez MD at [facility] OR 2ND FLR    BRONCHOSCOPY - flexible bronchoscopy with probable tissue biopsy CPT 19826 N/A 7/21/2015    Performed by Jake Alvarez MD at [facility] OR 2ND FLR    BRONCHOSCOPY - flexible bronchoscopy with probable tissue biospy CPT 26392 N/A 10/20/2015    Performed by Jake Alvarez MD at [facility] OR 2ND FLR    BRONCHOSCOPY - flexible bronchoscopy with tissue biopsy CPT 80790 N/A 9/29/2015    Performed by Jake Alvarez MD at [facility] OR 2ND FLR    BRONCHOSCOPY - flexible bronchoscopy with tissue biopsy CPT 99800 N/A 5/26/2015    Performed by Jake Alvarez MD at [facility] OR 1ST FLR    BRONCHOSCOPY flexible bronchoscopy with tissue biopsy N/A 9/8/2014    Performed by Jake Alvarez MD at [facility] OR 2ND FLR    BRONCHOSCOPY flexible with possible tissue biopsy N/A 8/8/2014    Performed by Jake Alvarez MD at [facility] OR 2ND FLR    BRONCHOSCOPY, BAL, BIOPSIES N/A 3/20/2018    Performed by Jake Alvarez MD at [facility] OR 2ND FLR    BRONCHOSCOPY-FIBEROPTIC  N/A 1/29/2016    [facility]    BRONCHOSCOPY; Bronchoscopy with BAL and transbronchial biopsies under general anesthesia N/A 5/29/2018    [facility]    CHOLECYSTECTOMY  2016    CHOLECYSTECTOMY-LAPAROSCOPIC N/A 7/22/2016    [facility]    ENDOMETRIAL ABLATION  2015    KJB    FESS      FESS Bilateral 10/21/2013    Performed by Jared Whatley MD at [facility] OR [address] [place] [address]    FINE NEEDLE ASPIRATION (FNA)  of a cervical lymphadenopathy  1/29/2016    Performed by Joann Cifuentes DO at [facility] OR [address] [place] [address]    flex bronchoscopy with probable tissue biopsy N/A 7/31/2014    Performed by [facility] Diagnostic Provider at [facility] OR [address] [place] [address]    flexible bronchoscopy with tissue biopsy N/A 12/11/2014    Performed by Jake Alvarez MD at [facility] OR [address] [place] [address]    INJECTION-VOCAL CORD Left 6/24/2014    Performed by Jared Whatley MD at [facility] OR [address] [place] [address]    [id number]-[id number]-X-CATH to right chest and removal of portacath to left chests wall. Bilateral 6/24/2014    Performed by Zhen Dorado MD at [facility] OR [address] [place] [address]    LARYNX SURGERY  2016    LUNG TRANSPLANT Bilateral 6/12/14    MYRINGOTOMY W/ TUBES Right 04/2017    MYRINGOTOMY WITH INSERTION OF PE TUBES Right 4/15/2017    Performed [facility] Gary Null [facility]    PLACEMENT-TUBE-PEG  5/15/2014    Performed [facility] David [facility]    PORTACATH PLACEMENT Right     rt sc    REMOVAL-TUBE-PEG  5/15/2014    Performed by David Moses MD at [facility] OR [address] [place] [address]    ROBOTIC ASSISTED LAPAROSCOPIC HYSTERECTOMY N/A 4/25/2017    Performed by [facility] at [facility] OR    SALPINGECTOMY Bilateral 2015    KJB    SALPINGECTOMY-LAPAROSCOPIC Bilateral 5/19/2015    Performed by [facility] Jr[facility] MD at [facility] OR    SINUS SURGERY FUNCTIONAL ENDOSCOPIC WITH NAVIGATION Bilateral 4/3/2017    Performed by Cesar Olsen MD at [facility] OR  2ND FLR    SINUS SURGERY FUNCTIONAL ENDOSCOPIC WITH NAVIGATION, 47125, 64993, 37796, 26965 Bilateral 2/5/2015    Performed by [facility]esar [facility]lsen MD at [facility] OR 2ND FLR    THYROPLASTY - Medialization laryngoplasty, cricothyroid subluxation, arytenoid repositioning Left 8/2/2016    Performed by Gary Null MD at [facility] OR 2ND FLR    TRANSPLANT-LUNG Bilateral 6/11/2014    Performed by Adolfo Huston MD at [facility] OR 2ND FLR       Review of patient's allergies indicates:   Allergen Reactions    Albuterol Palpitations    Colistin Anaphylaxis    Vancomycin analogues      Infusion reaction that does not resolve with slowing    Neupogen [filgrastim] Other (See Comments)     Ostealgia after five daily doses of 300 mcg.      Bactrim [sulfamethoxazole-trimethoprim] Hives    Ceftazidime Hives     Pt stated can tolerate cefapine not ceftazidime    Ceftazidime     Dronabinol Other (See Comments)     Mental changes/hallucinations    Haldol [haloperidol lactate] Other (See Comments)     Seizure like activity    Nsaids (non-steroidal anti-inflammatory drug)      Cannot have due to lung transplant    Adhesive Rash     Cloth tape- please use tegaderm or paper tape    Aztreonam Rash    Ciprofloxacin Nausea And Vomiting     Projectile N/V, per patient.  Unwilling to retry therapy.       Medications:  Medications Prior to Admission   Medication Sig    aripiprazole (ABILIFY) 2 MG Tab Take 2 mg by mouth once daily.    butalbital-acetaminophen-caffeine -40 mg (FIORICET, ESGIC) -40 mg per tablet TAKE 1 TABLET BY MOUTH EVERY 6 HOURS AS NEEDED FOR HEADACHE    calcium-vitamin D3 500 mg(1,250mg) -200 unit per tablet Take 1 tablet by mouth 2 (two) times daily with meals.    dapsone 100 MG Tab Take 1 tablet (100 mg total) by mouth once daily.    DULERA 100-5 mcg/actuation HFAA INHALE 1 PUFF BY MOUTH TWICE DAILY    fexofenadine (ALLEGRA) 180 MG tablet Take 180 mg by mouth once daily.    fluconazole (DIFLUCAN)  150 MG Tab TAKE 1 TABLET BY MOUTH EVERY WEEK    fluticasone (FLONASE) 50 mcg/actuation nasal spray INSERT 2 SPRAYS IN EACH NOSTRIL DAILY    folic acid (FOLVITE) 1 MG tablet Take 1 tablet (1,000 mcg total) by mouth once daily.    gabapentin (NEURONTIN) 300 MG capsule Take 1 capsule (300 mg total) by mouth 3 (three) times daily. (Patient taking differently: Take 1,100 mg by mouth 3 (three) times daily. )    levalbuterol (XOPENEX HFA) 45 mcg/actuation inhaler INHALE 1 TO 2 PUFFS INTO THE LUNGS EVERY 6 HOURS AS NEEDED FOR WHEEZING OR SHORTNESS OF BREATH. USE WITH SPACER.    lipase-protease-amylase (CREON) 36,000-114,000- 180,000 unit CpDR Take 4 capsules by mouth 3 (three) times daily with meals. Take 3 with snacks prn.    lisinopril 10 MG tablet Take 10 mg by mouth once daily.    LORazepam (ATIVAN) 1 MG tablet Take 1 mg by mouth every 6 (six) hours as needed for Anxiety.    magnesium oxide (MAG-OX) 400 mg tablet Take 1 tablet (400 mg total) by mouth 2 (two) times daily.    montelukast (SINGULAIR) 10 mg tablet Take 1 tablet (10 mg total) by mouth once daily.    morphine (MS CONTIN) 15 MG 12 hr tablet Take 15 mg by mouth 3 (three) times daily.    multivit,min52-folic-vitK-cQ10 (AQUADEKS) 100-700-10 mcg-mcg-mg Cap cap 1 tab twice daily    mycophenolate (CELLCEPT) 250 mg Cap Take 2 capsules (500 mg total) by mouth 2 (two) times daily.    omeprazole (PRILOSEC) 40 MG capsule TAKE 1 CAPSULE BY MOUTH EVERY MORNING    ondansetron (ZOFRAN) 8 MG tablet Take 1 tablet (8 mg total) by mouth every 8 (eight) hours as needed for Nausea.    oxycodone (ROXICODONE) 5 MG immediate release tablet Take 10 mg by mouth every 4 (four) hours as needed for Pain.    predniSONE (DELTASONE) 5 MG tablet Take 1 tablet (5 mg total) by mouth once daily.    PROCHAMBER USE AS DIRECTED    promethazine (PHENERGAN) 25 MG tablet Take 1 tablet (25 mg total) by mouth every 8 (eight) hours as needed.    tacrolimus (PROGRAF) 0.5 MG Cap Take 1  capsule (0.5 mg total) by mouth every 12 (twelve) hours.    tacrolimus (PROGRAF) 1 MG Cap Take 3 capsules (3 mg total) by mouth every 12 (twelve) hours. Daily doses: 3.5 mg every 12 hours.    tiZANidine (ZANAFLEX) 4 MG tablet TAKE 2 TABLETS BY MOUTH EVERY 6 HOURS AS NEEDED    topiramate (TOPAMAX) 25 MG tablet Take 1 tablet (25 mg total) by mouth 2 (two) times daily.    amlodipine (NORVASC) 5 MG tablet Take 10 mg by mouth once daily.     diphenhydrAMINE (BENADRYL) 50 MG tablet Take 1 tablet (50 mg total) by mouth every 6 (six) hours as needed for Itching.    mirtazapine (REMERON) 30 MG tablet Take 30 mg by mouth every evening.    polyethylene glycol (GLYCOLAX) 17 gram PwPk Take 17 g by mouth 2 (two) times daily.     Antibiotics (From admission, onward)    Start     Stop Route Frequency Ordered    09/28/18 0930  vancomycin 750 mg in dextrose 5 % 250 mL IVPB (ready to mix system)      -- IV Every 12 hours (non-standard times) 09/28/18 0821    09/27/18 1715  meropenem (MERREM) 2 g in sodium chloride 0.9% 100 mL IVPB      -- IV Every 8 hours (non-standard times) 09/27/18 1612    09/27/18 1445  dapsone tablet 100 mg      -- Oral Daily 09/27/18 1434        Antifungals (From admission, onward)    None        Antivirals (From admission, onward)    None           Immunization History   Administered Date(s) Administered    Cytomegalovirus Immune Globulin 06/13/2014       Family History     Problem Relation (Age of Onset)    Drug abuse Maternal Grandfather    Thrombocytopenia Maternal Grandfather        Social History     Socioeconomic History    Marital status: Single     Spouse name: None    Number of children: None    Years of education: None    Highest education level: None   Social Needs    Financial resource strain: None    Food insecurity - worry: None    Food insecurity - inability: None    Transportation needs - medical: None    Transportation needs - non-medical: None   Occupational History    None    Tobacco Use    Smoking status: Never Smoker    Smokeless tobacco: Never Used   Substance and Sexual Activity    Alcohol use: No     Comment: Has not had an alcoholic drink in more than 2 months.    Drug use: No    Sexual activity: Yes     Partners: Male     Birth control/protection: Implant   Other Topics Concern    None   Social History Narrative    None     Review of Systems   Respiratory: Positive for cough.    All other systems reviewed and are negative.    Objective:     Vital Signs (Most Recent):  Temp: 98.1 °F (36.7 °C) (09/28/18 0400)  Pulse: 100 (09/28/18 1059)  Resp: 13 (09/28/18 1059)  BP: 113/72 (09/28/18 1059)  SpO2: (!) 94 % (09/28/18 1059) Vital Signs (24h Range):  Temp:  [98.1 °F (36.7 °C)-98.5 °F (36.9 °C)] 98.1 °F (36.7 °C)  Pulse:  [] 100  Resp:  [11-18] 13  SpO2:  [93 %-95 %] 94 %  BP: ()/(68-94) 113/72     Weight: 58.9 kg (129 lb 13.6 oz)  Body mass index is 24.54 kg/m².    Estimated Creatinine Clearance: 62.2 mL/min (based on SCr of 1.1 mg/dL).    Physical Exam   Constitutional: No distress.   Neck: No JVD present.   Cardiovascular: Normal rate and regular rhythm.   No murmur heard.  Pulmonary/Chest: Effort normal and breath sounds normal. She has no wheezes. She has no rales.   Abdominal: Soft. Bowel sounds are normal. She exhibits no distension. There is no tenderness.   Musculoskeletal: She exhibits no edema.   Neurological: She is alert.   Skin: Skin is warm. No rash noted. She is not diaphoretic. No pallor.   Psychiatric: She has a normal mood and affect.   Very reluctant historian, seems annoyed        Significant Labs:   CBC:   Recent Labs   Lab  09/27/18   1610  09/28/18   0330   WBC  4.93  5.50   HGB  7.4*  8.0*   HCT  25.9*  27.2*   PLT  153  166       Significant Imaging: I have reviewed all pertinent imaging results/findings within the past 24 hours.

## 2018-09-29 NOTE — ASSESSMENT & PLAN NOTE
Concern for possible infectious etiology versus progression of CARLOS EDUARDO. Patient last received half pulse steroids x 4 doses in May 2018.   CT Chest 9/25 from [facility] reviewed by Dr. Mae. Patient has recently received 2 weeks of IV Vancomycin and Meropenem at [facility].     Will continue empiric IV Vancomycin and Meropenem and plan for OR bronchoscopy on Monday. RPP, respiratory culture ordered. CT chest from [facility] with new multi-focal opacities at the bilateral lung bases. Concerns for progression of CARLOS EDUARDO vs. infection. Continue antibiotics for now. Maintain O2 sats > 88%.

## 2018-09-29 NOTE — PROGRESS NOTES
Phone call received from lab. Patient's vanc trough is 30.5 this morning. Carlotta DUARTE notified. Orders given to hold the morning dose.

## 2018-09-29 NOTE — ASSESSMENT & PLAN NOTE
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 History of MRSA and pseudomonas with recent history of prolonged antibiotic therapy.    Will continue current immunosuppression and prophylaxis. Concerns for possible progression of CARLOS EDUARDO vs infectious etiology. Patient last received empiric half pulse steroids in May 2018. OR bronchoscopy scheduled for 10/1.

## 2018-09-29 NOTE — SUBJECTIVE & OBJECTIVE
Subjective:     Interval History: No acute events overnight. Patient remains afebrile on Vanc/Meropenem. Patient continues to have dry, hacking cough, SOB, and fatigue which is somewhat improved from yesterday. No new complaints today.     Continuous Infusions:  Scheduled Meds:   amLODIPine  10 mg Oral Daily    ARIPiprazole  2 mg Oral Daily    calcium-vitamin D3  1 tablet Oral BID WM    dapsone  100 mg Oral Daily    diphenhydrAMINE  50 mg Intravenous Q12H    enoxaparin  40 mg Subcutaneous Daily    fexofenadine  180 mg Oral Daily    fluticasone  2 spray Each Nare Daily    gabapentin  1,100 mg Oral TID    levalbuterol  0.63 mg Nebulization Q8H WA    lipase-protease-amylase  4 capsule Oral TID WM    lisinopril  10 mg Oral Daily    magnesium oxide  400 mg Oral BID    meropenem (MERREM) IVPB  2 g Intravenous Q8H    montelukast  10 mg Oral Daily    morphine  15 mg Oral TID    multivit-min-FA-coenzyme Q10 100-5 mcg-mg  1 tablet Oral BID    mycophenolate  500 mg Oral BID    predniSONE  5 mg Oral Daily    tacrolimus  0.5 mg Oral BID    tacrolimus  3 mg Oral BID    topiramate  50 mg Oral BID    vancomycin 750 mg in dextrose 5 % 250 mL IVPB (ready to mix system)  750 mg Intravenous Q12H    venlafaxine  75 mg Oral QHS     PRN Meds:acetaminophen, butalbital-acetaminophen-caffeine -40 mg, diphenhydrAMINE, guaiFENesin, heparin, porcine (PF), ipratropium, LORazepam, ondansetron, oxyCODONE, polyethylene glycol, potassium chloride 10% **AND** potassium chloride 10% **AND** potassium chloride 10%, promethazine, tiZANidine    Review of patient's allergies indicates:   Allergen Reactions    Albuterol Palpitations    Colistin Anaphylaxis    Vancomycin analogues      Infusion reaction that does not resolve with slowing    Neupogen [filgrastim] Other (See Comments)     Ostealgia after five daily doses of 300 mcg.      Bactrim [sulfamethoxazole-trimethoprim] Hives    Ceftazidime Hives     Pt stated can  tolerate cefapine not ceftazidime    Ceftazidime     Dronabinol Other (See Comments)     Mental changes/hallucinations    Haldol [haloperidol lactate] Other (See Comments)     Seizure like activity    Nsaids (non-steroidal anti-inflammatory drug)      Cannot have due to lung transplant    Adhesive Rash     Cloth tape- please use tegaderm or paper tape    Aztreonam Rash    Ciprofloxacin Nausea And Vomiting     Projectile N/V, per patient.  Unwilling to retry therapy.       Review of Systems   Constitutional: Positive for fatigue. Negative for appetite change, chills, diaphoresis and fever.   HENT: Positive for congestion (chronic, stable). Negative for postnasal drip, sore throat and trouble swallowing.    Respiratory: Positive for cough and shortness of breath.    Cardiovascular: Negative for chest pain, palpitations and leg swelling.   Gastrointestinal: Negative for abdominal distention, abdominal pain, constipation, diarrhea, nausea and vomiting.   Endocrine: Negative for polydipsia, polyphagia and polyuria.   Genitourinary: Negative for dysuria, frequency and urgency.   Musculoskeletal: Positive for myalgias. Negative for back pain, neck pain and neck stiffness.   Skin: Negative for color change, pallor and rash.   Neurological: Negative for dizziness, light-headedness and headaches (chronic, well controlled on current regimen).   Psychiatric/Behavioral: Negative for agitation, behavioral problems and confusion. The patient is nervous/anxious.      Objective:   Physical Exam   Constitutional: She is oriented to person, place, and time. She appears well-developed and well-nourished. She has a sickly appearance. No distress.   HENT:   Head: Normocephalic and atraumatic.   Right Ear: External ear normal.   Left Ear: External ear normal.   Nose: Nose normal.   Mouth/Throat: Oropharynx is clear and moist. No oropharyngeal exudate.   Eyes: Conjunctivae and EOM are normal.   Neck: Normal range of motion. Neck  supple. No JVD present.   Cardiovascular: Normal rate, regular rhythm, normal heart sounds and intact distal pulses. Exam reveals no gallop and no friction rub.   No murmur heard.  Pulmonary/Chest: Effort normal. She has decreased breath sounds in the right middle field, the right lower field, the left middle field and the left lower field. She has no wheezes. She has no rhonchi. She has no rales.   Abdominal: Soft. Bowel sounds are normal. She exhibits no distension. There is no tenderness. There is no guarding.   Musculoskeletal: Normal range of motion. She exhibits no edema.   Lymphadenopathy:     She has no cervical adenopathy.   Neurological: She is alert and oriented to person, place, and time. No cranial nerve deficit.   Skin: Skin is warm and dry. Capillary refill takes less than 2 seconds. No rash noted. She is not diaphoretic. No erythema. There is pallor.   Psychiatric: Her speech is normal and behavior is normal. Thought content normal. Her mood appears anxious. She exhibits a depressed mood.   Nursing note and vitals reviewed.        Vital Signs (Most Recent):  Temp: 98.2 °F (36.8 °C) (09/29/18 1200)  Pulse: 94 (09/29/18 1200)  Resp: 16 (09/29/18 1200)  BP: 110/66 (09/29/18 1200)  SpO2: (!) 90 % (09/29/18 1200) Vital Signs (24h Range):  Temp:  [97.7 °F (36.5 °C)-98.7 °F (37.1 °C)] 98.2 °F (36.8 °C)  Pulse:  [] 94  Resp:  [16-20] 16  SpO2:  [90 %-98 %] 90 %  BP: ()/(47-66) 110/66     Weight: 58.7 kg (129 lb 6.6 oz)  Body mass index is 24.45 kg/m².      Intake/Output Summary (Last 24 hours) at 9/29/2018 1341  Last data filed at 9/29/2018 1000  Gross per 24 hour   Intake 1640 ml   Output 2800 ml   Net -1160 ml       Significant Labs:  CBC:  Recent Labs   Lab  09/29/18   0415   WBC  5.01   RBC  2.93*   HGB  7.1*   HCT  24.8*   PLT  147*   MCV  85   MCH  24.2*   MCHC  28.6*     BMP:  Recent Labs   Lab  09/29/18   0415   NA  139   K  4.6   CL  108   CO2  24   BUN  18   CREATININE  1.2   CALCIUM   8.4*      Tacrolimus Levels:  Recent Labs   Lab  09/29/18   0415   TACROLIMUS  8.3     Microbiology:  Microbiology Results (last 7 days)     Procedure Component Value Units Date/Time    Respiratory Viral Panel by PCR [facility]; Nasal Swab [[id number]]     Order Status:  No result Specimen:  Respiratory     Culture, Respiratory with Gram Stain [[id number]]     Order Status:  No result Specimen:  Respiratory from Sputum, Expectorated           I have reviewed all pertinent labs within the past 24 hours.    Diagnostic Results:  CT: Reviewed - increased, patchy opacities within bilateral lung bases.

## 2018-09-29 NOTE — PROGRESS NOTES
[facility]  Infectious Disease  Progress Note    Patient Name: Juanita Ibarra  MRN: [id number]  Admission Date: 9/27/2018  Length of Stay: 2 days  Attending Physician: Francisca Morin DO  Primary Care Provider: Jake Alvarez MD    Isolation Status: No active isolations  Assessment/Plan:      Pneumonia    29yo woman w/a history of CF (c/b pancreatic insufficiency, sinus disease, MRSA/Pseudomonas colonization c/b numerous antibiotics allergies, and subsequent COPD/ESLD; s/p BOLT 6/12/2014, CMV D+/R-, simulect induction, on maintenance tacro/MMF/pred; c/b multiple episodes of MRSA/Pseudomonas pneumonia/sinusitis, 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 Differential given chest imaging findings includes: viral infection (CARVI vs CMV), IFI, rejection, or CARLOS EDUARDO (with the latter most likely given her clinical history). She remains stable on vanc/beatrice while awaiting BAL/biopsy on Monday.    - may continue vanc/beatrice for now and will taper off after bronch  - agree with pursuit of bronchoscopy for cultures and biopsies when able  - await pending fungal markers            Anticipated Disposition: pending improvement    Thank you for your consult. I will follow-up with patient. Please contact us if you have any additional questions.     Juanita Francis MD  Transplant ID Attending  [id number]    Juanita Francis MD  Infectious Disease  [facility]    Subjective:     Principal Problem:Bronchiolitis obliterans syndrome    HPI:     Interval History: No acute events  or clinical change. Afebrile. Aspergillus antigen negative.     Review of Systems   Constitutional: Positive for fatigue. Negative for appetite change, chills, diaphoresis and fever.   HENT: Positive for congestion (chronic, stable). Negative for postnasal drip, sore throat and trouble swallowing.    Respiratory: Positive for cough and shortness of breath.    Cardiovascular: Negative for chest pain, palpitations and leg swelling.   Gastrointestinal: Negative for abdominal distention, abdominal pain, constipation, diarrhea, nausea and vomiting.   Endocrine: Negative for polydipsia, polyphagia and polyuria.   Genitourinary: Negative for dysuria, frequency and urgency.   Musculoskeletal: Positive for myalgias. Negative for back pain, neck pain and neck stiffness.   Skin: Negative for color change, pallor and rash.   Neurological: Negative for dizziness, light-headedness and headaches (chronic, well controlled on current regimen).   Psychiatric/Behavioral: Negative for agitation, behavioral problems and confusion. The patient is nervous/anxious.      Objective:     Vital Signs (Most Recent):  Temp: 98.2 °F (36.8 °C) (09/29/18 1200)  Pulse: 94 (09/29/18 1200)  Resp: 16 (09/29/18 1200)  BP: 110/66 (09/29/18 1200)  SpO2: (!) 90 % (09/29/18 1200) Vital Signs (24h Range):  Temp:  [97.7 °F (36.5 °C)-98.7 °F (37.1 °C)] 98.2 °F (36.8 °C)  Pulse:  [] 94  Resp:  [16-20] 16  SpO2:  [90 %-98 %] 90 %  BP: ()/(47-66) 110/66     Weight: 58.7 kg (129 lb 6.6 oz)  Body mass index is 24.45 kg/m².    Estimated Creatinine Clearance: 57 mL/min (based on SCr of 1.2 mg/dL).    Physical Exam   Constitutional: She is oriented to person, place, and time. She appears well-developed and well-nourished. She has a sickly appearance. No distress.   HENT:   Head: Normocephalic and atraumatic.   Right Ear: External ear normal.   Left Ear: External ear normal.   Nose: Nose normal.   Mouth/Throat: Oropharynx is clear and moist. No  oropharyngeal exudate.   Eyes: Conjunctivae and EOM are normal.   Neck: Normal range of motion. Neck supple. No JVD present.   Cardiovascular: Normal rate, regular rhythm, normal heart sounds and intact distal pulses. Exam reveals no gallop and no friction rub.   No murmur heard.  Pulmonary/Chest: Effort normal. She has decreased breath sounds in the right middle field, the right lower field, the left middle field and the left lower field. She has no wheezes. She has no rhonchi. She has no rales.   Abdominal: Soft. Bowel sounds are normal. She exhibits no distension. There is no tenderness. There is no guarding.   Musculoskeletal: Normal range of motion. She exhibits no edema.   Lymphadenopathy:     She has no cervical adenopathy.   Neurological: She is alert and oriented to person, place, and time. No cranial nerve deficit.   Skin: Skin is warm and dry. Capillary refill takes less than 2 seconds. No rash noted. She is not diaphoretic. No erythema. There is pallor.   Psychiatric: Her speech is normal and behavior is normal. Thought content normal. Her mood appears anxious. She exhibits a depressed mood.   Nursing note and vitals reviewed.      Significant Labs:   CBC:   Recent Labs   Lab  09/27/18   1610  09/28/18   0330  09/29/18   0415   WBC  4.93  5.50  5.01   HGB  7.4*  8.0*  7.1*   HCT  25.9*  27.2*  24.8*   PLT  153  166  147*     CMP:   Recent Labs   Lab  09/28/18   0330  09/29/18   0415   NA  140  139   K  4.7  4.6   CL  108  108   CO2  27  24   GLU  87  83   BUN  14  18   CREATININE  1.1  1.2   CALCIUM  8.5*  8.4*   PROT  6.3  5.9*   ALBUMIN  3.2*  3.1*   BILITOT  0.3  0.3   ALKPHOS  150*  170*   AST  19  42*   ALT  21  29   ANIONGAP  5*  7*   EGFRNONAA  >60.0  >60.0       Significant Imaging: I have reviewed all pertinent imaging results/findings within the past 24 hours.

## 2018-09-29 NOTE — SUBJECTIVE & OBJECTIVE
Interval History: No acute events or clinical change. Afebrile. Aspergillus antigen negative.     Review of Systems   Constitutional: Positive for fatigue. Negative for appetite change, chills, diaphoresis and fever.   HENT: Positive for congestion (chronic, stable). Negative for postnasal drip, sore throat and trouble swallowing.    Respiratory: Positive for cough and shortness of breath.    Cardiovascular: Negative for chest pain, palpitations and leg swelling.   Gastrointestinal: Negative for abdominal distention, abdominal pain, constipation, diarrhea, nausea and vomiting.   Endocrine: Negative for polydipsia, polyphagia and polyuria.   Genitourinary: Negative for dysuria, frequency and urgency.   Musculoskeletal: Positive for myalgias. Negative for back pain, neck pain and neck stiffness.   Skin: Negative for color change, pallor and rash.   Neurological: Negative for dizziness, light-headedness and headaches (chronic, well controlled on current regimen).   Psychiatric/Behavioral: Negative for agitation, behavioral problems and confusion. The patient is nervous/anxious.      Objective:     Vital Signs (Most Recent):  Temp: 98.2 °F (36.8 °C) (09/29/18 1200)  Pulse: 94 (09/29/18 1200)  Resp: 16 (09/29/18 1200)  BP: 110/66 (09/29/18 1200)  SpO2: (!) 90 % (09/29/18 1200) Vital Signs (24h Range):  Temp:  [97.7 °F (36.5 °C)-98.7 °F (37.1 °C)] 98.2 °F (36.8 °C)  Pulse:  [] 94  Resp:  [16-20] 16  SpO2:  [90 %-98 %] 90 %  BP: ()/(47-66) 110/66     Weight: 58.7 kg (129 lb 6.6 oz)  Body mass index is 24.45 kg/m².    Estimated Creatinine Clearance: 57 mL/min (based on SCr of 1.2 mg/dL).    Physical Exam   Constitutional: She is oriented to person, place, and time. She appears well-developed and well-nourished. She has a sickly appearance. No distress.   HENT:   Head: Normocephalic and atraumatic.   Right Ear: External ear normal.   Left Ear: External ear normal.   Nose: Nose normal.   Mouth/Throat: Oropharynx  is clear and moist. No oropharyngeal exudate.   Eyes: Conjunctivae and EOM are normal.   Neck: Normal range of motion. Neck supple. No JVD present.   Cardiovascular: Normal rate, regular rhythm, normal heart sounds and intact distal pulses. Exam reveals no gallop and no friction rub.   No murmur heard.  Pulmonary/Chest: Effort normal. She has decreased breath sounds in the right middle field, the right lower field, the left middle field and the left lower field. She has no wheezes. She has no rhonchi. She has no rales.   Abdominal: Soft. Bowel sounds are normal. She exhibits no distension. There is no tenderness. There is no guarding.   Musculoskeletal: Normal range of motion. She exhibits no edema.   Lymphadenopathy:     She has no cervical adenopathy.   Neurological: She is alert and oriented to person, place, and time. No cranial nerve deficit.   Skin: Skin is warm and dry. Capillary refill takes less than 2 seconds. No rash noted. She is not diaphoretic. No erythema. There is pallor.   Psychiatric: Her speech is normal and behavior is normal. Thought content normal. Her mood appears anxious. She exhibits a depressed mood.   Nursing note and vitals reviewed.      Significant Labs:   CBC:   Recent Labs   Lab  09/27/18   1610  09/28/18   0330  09/29/18   0415   WBC  4.93  5.50  5.01   HGB  7.4*  8.0*  7.1*   HCT  25.9*  27.2*  24.8*   PLT  153  166  147*     CMP:   Recent Labs   Lab  09/28/18   0330  09/29/18   0415   NA  140  139   K  4.7  4.6   CL  108  108   CO2  27  24   GLU  87  83   BUN  14  18   CREATININE  1.1  1.2   CALCIUM  8.5*  8.4*   PROT  6.3  5.9*   ALBUMIN  3.2*  3.1*   BILITOT  0.3  0.3   ALKPHOS  150*  170*   AST  19  42*   ALT  21  29   ANIONGAP  5*  7*   EGFRNONAA  >60.0  >60.0       Significant Imaging: I have reviewed all pertinent imaging results/findings within the past 24 hours.

## 2018-09-29 NOTE — PROGRESS NOTES
[facility]  Lung Transplant  Progress Note - Floor    Patient Name: Juanita Ibarra  MRN: [id number]  Admission Date: 9/27/2018  Hospital Length of Stay: 2 days  Post-Operative Day: 1570  Attending Physician: Francisca Morin DO  Primary Care Provider: Jake Alvarez MD     Subjective:     Interval History: No acute events overnight. Patient remains afebrile on Vanc/Meropenem. Patient continues to have dry, hacking cough, SOB, and fatigue which is somewhat improved from yesterday. No new complaints today.     Continuous Infusions:  Scheduled Meds:   amLODIPine  10 mg Oral Daily    ARIPiprazole  2 mg Oral Daily    calcium-vitamin D3  1 tablet Oral BID WM    dapsone  100 mg Oral Daily    diphenhydrAMINE  50 mg Intravenous Q12H    enoxaparin  40 mg Subcutaneous Daily    fexofenadine  180 mg Oral Daily    fluticasone  2 spray Each Nare Daily    gabapentin  1,100 mg Oral TID    levalbuterol  0.63 mg Nebulization Q8H WA    lipase-protease-amylase  4 capsule Oral TID WM    lisinopril  10 mg Oral Daily    magnesium oxide  400 mg Oral BID    meropenem (MERREM) IVPB  2 g Intravenous Q8H    montelukast  10 mg Oral Daily    morphine  15 mg Oral TID    multivit-min-FA-coenzyme Q10 100-5 mcg-mg  1 tablet Oral BID    mycophenolate  500 mg Oral BID    predniSONE  5 mg Oral Daily    tacrolimus  0.5 mg Oral BID    tacrolimus  3 mg Oral BID    topiramate  50 mg Oral BID    vancomycin 750 mg in dextrose 5 % 250 mL IVPB (ready to mix system)  750 mg Intravenous Q12H    venlafaxine  75 mg Oral QHS     PRN Meds:acetaminophen, butalbital-acetaminophen-caffeine -40 mg, diphenhydrAMINE, guaiFENesin, heparin, porcine (PF), ipratropium, LORazepam, ondansetron, oxyCODONE, polyethylene glycol, potassium chloride 10% **AND** potassium chloride 10% **AND** potassium chloride 10%, promethazine, tiZANidine    Review of patient's allergies indicates:   Allergen Reactions    Albuterol  Palpitations    Colistin Anaphylaxis    Vancomycin analogues      Infusion reaction that does not resolve with slowing    Neupogen [filgrastim] Other (See Comments)     Ostealgia after five daily doses of 300 mcg.      Bactrim [sulfamethoxazole-trimethoprim] Hives    Ceftazidime Hives     Pt stated can tolerate cefapine not ceftazidime    Ceftazidime     Dronabinol Other (See Comments)     Mental changes/hallucinations    Haldol [haloperidol lactate] Other (See Comments)     Seizure like activity    Nsaids (non-steroidal anti-inflammatory drug)      Cannot have due to lung transplant    Adhesive Rash     Cloth tape- please use tegaderm or paper tape    Aztreonam Rash    Ciprofloxacin Nausea And Vomiting     Projectile N/V, per patient.  Unwilling to retry therapy.       Review of Systems   Constitutional: Positive for fatigue. Negative for appetite change, chills, diaphoresis and fever.   HENT: Positive for congestion (chronic, stable). Negative for postnasal drip, sore throat and trouble swallowing.    Respiratory: Positive for cough and shortness of breath.    Cardiovascular: Negative for chest pain, palpitations and leg swelling.   Gastrointestinal: Negative for abdominal distention, abdominal pain, constipation, diarrhea, nausea and vomiting.   Endocrine: Negative for polydipsia, polyphagia and polyuria.   Genitourinary: Negative for dysuria, frequency and urgency.   Musculoskeletal: Positive for myalgias. Negative for back pain, neck pain and neck stiffness.   Skin: Negative for color change, pallor and rash.   Neurological: Negative for dizziness, light-headedness and headaches (chronic, well controlled on current regimen).   Psychiatric/Behavioral: Negative for agitation, behavioral problems and confusion. The patient is nervous/anxious.      Objective:   Physical Exam   Constitutional: She is oriented to person, place, and time. She appears well-developed and well-nourished. She has a sickly  appearance. No distress.   HENT:   Head: Normocephalic and atraumatic.   Right Ear: External ear normal.   Left Ear: External ear normal.   Nose: Nose normal.   Mouth/Throat: Oropharynx is clear and moist. No oropharyngeal exudate.   Eyes: Conjunctivae and EOM are normal.   Neck: Normal range of motion. Neck supple. No JVD present.   Cardiovascular: Normal rate, regular rhythm, normal heart sounds and intact distal pulses. Exam reveals no gallop and no friction rub.   No murmur heard.  Pulmonary/Chest: Effort normal. She has decreased breath sounds in the right middle field, the right lower field, the left middle field and the left lower field. She has no wheezes. She has no rhonchi. She has no rales.   Abdominal: Soft. Bowel sounds are normal. She exhibits no distension. There is no tenderness. There is no guarding.   Musculoskeletal: Normal range of motion. She exhibits no edema.   Lymphadenopathy:     She has no cervical adenopathy.   Neurological: She is alert and oriented to person, place, and time. No cranial nerve deficit.   Skin: Skin is warm and dry. Capillary refill takes less than 2 seconds. No rash noted. She is not diaphoretic. No erythema. There is pallor.   Psychiatric: Her speech is normal and behavior is normal. Thought content normal. Her mood appears anxious. She exhibits a depressed mood.   Nursing note and vitals reviewed.        Vital Signs (Most Recent):  Temp: 98.2 °F (36.8 °C) (09/29/18 1200)  Pulse: 94 (09/29/18 1200)  Resp: 16 (09/29/18 1200)  BP: 110/66 (09/29/18 1200)  SpO2: (!) 90 % (09/29/18 1200) Vital Signs (24h Range):  Temp:  [97.7 °F (36.5 °C)-98.7 °F (37.1 °C)] 98.2 °F (36.8 °C)  Pulse:  [] 94  Resp:  [16-20] 16  SpO2:  [90 %-98 %] 90 %  BP: ()/(47-66) 110/66     Weight: 58.7 kg (129 lb 6.6 oz)  Body mass index is 24.45 kg/m².      Intake/Output Summary (Last 24 hours) at 9/29/2018 1341  Last data filed at 9/29/2018 1000  Gross per 24 hour   Intake 1640 ml   Output  2800 ml   Net -1160 ml       Significant Labs:  CBC:  Recent Labs   Lab  09/29/18   0415   WBC  5.01   RBC  2.93*   HGB  7.1*   HCT  24.8*   PLT  147*   MCV  85   MCH  24.2*   MCHC  28.6*     BMP:  Recent Labs   Lab  09/29/18   0415   NA  139   K  4.6   CL  108   CO2  24   BUN  18   CREATININE  1.2   CALCIUM  8.4*      Tacrolimus Levels:  Recent Labs   Lab  09/29/18   0415   TACROLIMUS  8.3     Microbiology:  Microbiology Results (last 7 days)     Procedure Component Value Units Date/Time    Respiratory Viral Panel by PCR [facility]; Nasal Swab [[id number]]     Order Status:  No result Specimen:  Respiratory     Culture, Respiratory with Gram Stain [[id number]]     Order Status:  No result Specimen:  Respiratory from Sputum, Expectorated           I have reviewed all pertinent labs within the past 24 hours.    Diagnostic Results:  CT: Reviewed - increased, patchy opacities within bilateral lung bases.       Assessment/Plan:     * Bronchiolitis obliterans syndrome    Concern for possible infectious etiology versus progression of CARLOS EDUARDO. Patient last received half pulse steroids x 4 doses in May 2018.   CT Chest 9/25 from [place] reviewed by Dr. Mae. Patient has recently received 2 weeks of IV Vancomycin and Meropenem at [place].     Will continue empiric IV Vancomycin and Meropenem and plan for OR bronchoscopy on Monday. RPP, respiratory culture ordered. CT chest from [place] with new multi-focal opacities at the bilateral lung bases. Concerns for progression of CARLOS EDUARDO vs. infection. Continue antibiotics for now. Maintain O2 sats > 88%.        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 History of MRSA and pseudomonas with recent history of prolonged antibiotic therapy.    Will continue current immunosuppression and prophylaxis. Concerns  for possible progression of CARLOS EDUARDO vs infectious etiology. Patient last received empiric half pulse steroids in May 2018. OR bronchoscopy scheduled for 10/1.         Prophylactic antibiotic    Continue Dapsone 100 mg daily for PCP/PJP prophylaxis.         Immunosuppression    Current outpatient regimen: Tacrolimus 3.5 mg, Prednisone 5 mg daily, and  mg BID.     Will continue current outpatient regimen. Continue daily monitoring of tacrolimus levels and dose adjust accordingly.        Sinusitis, chronic    Symptomatically at baseline. Continue with Allegra, Singulair, and Flonase.         Abnormal chest CT    CT chest from [place] with new multi-focal opacities at the bilateral lung bases. Concerns for progression of CARLOS EDUARDO vs infectious etiology. Plan for OR bronchoscopy on Monday. Continue empiric coverage for now with IV Vanc/Meropenem.             Shama Canales PA-C  Lung Transplant  [facility]

## 2018-09-29 NOTE — ASSESSMENT & PLAN NOTE
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 Differential given chest imaging findings includes: viral infection (CARVI vs CMV), IFI, rejection, or CARLOS EDUARDO (with the latter most likely given her clinical history). She remains stable on vanc/beatrice while awaiting BAL/biopsy on Monday.    - may continue vanc/beatrice for now and will taper off after bronch  - agree with pursuit of bronchoscopy for cultures and biopsies when able  - await pending fungal markers

## 2018-09-29 NOTE — ASSESSMENT & PLAN NOTE
CT chest from [place] with new multi-focal opacities at the bilateral lung bases. Concerns for progression of CARLOS EDUARDO vs infectious etiology. Plan for OR bronchoscopy on Monday. Continue empiric coverage for now with IV Vanc/Meropenem.    Subjective   Tessie Mckeon is a 77 y.o. female is here today for follow-up.  Chief Complaint   Patient presents with   • Heartburn   • Bloated   • Diarrhea   • Fatigue     History of Present Illness  Patient has a couple of issues.  She developed diarrhea subsequent to using Cleocin, which also seemed to hurt her stomach.  The diarrhea is gotten better, hasn't completely resolved but she has seen improvement.  She is also having heartburn and occasional globus but no dysphagia as such.  She has postprandial bloating.  No hematochezia, melena, or hematemesis.  No weight loss.  The following portions of the patient's history were reviewed and updated as appropriate: allergies, current medications, past family history, past medical history, past social history, past surgical history and problem list.    Review of Systems    Objective   Physical Exam      Assessment/Plan   Problems Addressed this Visit        Digestive    Heartburn - Primary    Relevant Orders    Clostridium Difficile Toxin, PCR    Occult Blood X 1, Stool    Diarrhea    Relevant Orders    Clostridium Difficile Toxin, PCR    Occult Blood X 1, Stool       Other    Malaise and fatigue    Relevant Orders    Clostridium Difficile Toxin, PCR    Occult Blood X 1, Stool    Bloating    Relevant Orders    Clostridium Difficile Toxin, PCR    Occult Blood X 1, Stool        FODMAPS.  Probiotic therapy of choice.  Pepcid Complete.  Patient states that the PPIs cause arthralgias and it seems to be a reproducible effect.  I'll see her back in a month.  If symptoms are not significantly better, we'll need to update her endoscopic studies.

## 2018-09-29 NOTE — PLAN OF CARE
Problem: Patient Care Overview  Goal: Plan of Care Review  Outcome: Ongoing (interventions implemented as appropriate)  Patient is AAOx3, independent. Patient c/o pain. Patient is receiving pain medication scheduled and PRN. Patient c/o itching and request benadryl. Patient's smith needle is due to be changed today. Smith needle removed this morning after the dose of meropenem per the patient's request. Patient request not to have her port re-accessed until her next dose of benadryl is due. Patient's vanc trough was 30.5 this morning. Vanc held per orders. Patient's O2 stats 90-94% on room air. Patient has been wearing her nasal cannula as needed. Encouraging the patient to get OOB to the chair and walk in the halls. Reminded the patient to call for assistance. Call light and personal items are within reach.

## 2018-09-30 NOTE — ASSESSMENT & PLAN NOTE
CT chest from [place] with new multi-focal opacities at the bilateral lung bases. Concerns for progression of CARLOS EDUARDO vs infectious etiology. Plan for OR bronchoscopy on Monday. Continue empiric coverage for now with IV Vanc/Meropenem.

## 2018-09-30 NOTE — PLAN OF CARE
Problem: Patient Care Overview  Goal: Plan of Care Review  Outcome: Ongoing (interventions implemented as appropriate)  Patient is AAOx3, independent. Patient c/o pain. Patient is receiving pain medication scheduled and PRN. Patient c/o itching and request benadryl. Patient's random vanc level was 28.9 this morning. Vanc changed to every 24 hours. Patient's O2 stats 90-94% on room air. Patient has been wearing her nasal cannula as needed. Encouraging the patient to get OOB to the chair and walk in the halls. Patient to be NPO after MN for a [facility] tomorrow. Reminded the patient to call for assistance. Call light and personal items are within reach.

## 2018-09-30 NOTE — SUBJECTIVE & OBJECTIVE
Interval History:     Still has some SOB and cough  No GI symptoms     Review of Systems   Respiratory: Positive for cough and shortness of breath.    All other systems reviewed and are negative.    Objective:     Vital Signs (Most Recent):  Temp: 98.7 °F (37.1 °C) (09/30/18 1151)  Pulse: 97 (09/30/18 1151)  Resp: 18 (09/30/18 1151)  BP: (!) 112/59 (09/30/18 1151)  SpO2: 95 % (09/30/18 1151) Vital Signs (24h Range):  Temp:  [98.1 °F (36.7 °C)-98.8 °F (37.1 °C)] 98.7 °F (37.1 °C)  Pulse:  [] 97  Resp:  [15-20] 18  SpO2:  [94 %-96 %] 95 %  BP: (102-115)/(58-76) 112/59     Weight: 58.7 kg (129 lb 6.6 oz)  Body mass index is 24.45 kg/m².    Estimated Creatinine Clearance: 57 mL/min (based on SCr of 1.2 mg/dL).    Physical Exam   Constitutional: She is oriented to person, place, and time.   Neck: No JVD present.   Cardiovascular: Normal rate and regular rhythm.   Pulmonary/Chest: Effort normal and breath sounds normal.   Abdominal: Soft. Bowel sounds are normal.   Musculoskeletal: She exhibits no edema.   Neurological: She is alert and oriented to person, place, and time.   Skin: Skin is warm and dry. No rash noted.   Psychiatric: She has a normal mood and affect. Her behavior is normal.       Significant Labs:   CBC:   Recent Labs   Lab  09/29/18   0415  09/30/18   0500   WBC  5.01  5.34   HGB  7.1*  7.0*   HCT  24.8*  24.3*   PLT  147*  130*       Significant Imaging: I have reviewed all pertinent imaging results/findings within the past 24 hours.

## 2018-09-30 NOTE — ASSESSMENT & PLAN NOTE
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 History of MRSA and pseudomonas with recent history of prolonged antibiotic therapy.    Will continue current immunosuppression and prophylaxis. Concerns for possible progression of CARLOS EDUARDO vs infectious etiology. Patient last received empiric half pulse steroids in May 2018. OR bronchoscopy scheduled for 10/1 at noon.

## 2018-09-30 NOTE — PROGRESS NOTES
[facility]  Lung Transplant  Progress Note - Floor    Patient Name: Juanita Ibarra  MRN: [id number]  Admission Date: 9/27/2018  Hospital Length of Stay: 3 days  Post-Operative Day: 1571  Attending Physician: Francisca Morin DO  Primary Care Provider: Jake Alvarez MD     Subjective:     Interval History: No acute events overnight. Patient remains afebrile on Vanc/Meropenem. Patient continues to have dry, hacking cough, SOB, and fatigue unchanged from previous. OR bronchoscopy tomorrow afternoon.     Continuous Infusions:  Scheduled Meds:   amLODIPine  10 mg Oral Daily    ARIPiprazole  2 mg Oral Daily    calcium-vitamin D3  1 tablet Oral BID WM    dapsone  100 mg Oral Daily    diphenhydrAMINE  50 mg Intravenous Q12H    enoxaparin  40 mg Subcutaneous Daily    fexofenadine  180 mg Oral Daily    fluticasone  2 spray Each Nare Daily    gabapentin  1,100 mg Oral TID    levalbuterol  0.63 mg Nebulization Q8H WA    lipase-protease-amylase  4 capsule Oral TID WM    lisinopril  10 mg Oral Daily    magnesium oxide  400 mg Oral BID    meropenem (MERREM) IVPB  2 g Intravenous Q8H    montelukast  10 mg Oral Daily    morphine  15 mg Oral TID    multivit-min-FA-coenzyme Q10 100-5 mcg-mg  1 tablet Oral BID    mycophenolate  500 mg Oral BID    predniSONE  5 mg Oral Daily    tacrolimus  0.5 mg Oral BID    tacrolimus  3 mg Oral BID    topiramate  50 mg Oral BID    vancomycin 750 mg in dextrose 5 % 250 mL IVPB (ready to mix system)  750 mg Intravenous Q24H    venlafaxine  75 mg Oral QHS     PRN Meds:acetaminophen, butalbital-acetaminophen-caffeine -40 mg, diphenhydrAMINE, guaiFENesin, heparin, porcine (PF), ipratropium, LORazepam, ondansetron, oxyCODONE, polyethylene glycol, potassium chloride 10% **AND** potassium chloride 10% **AND** potassium chloride 10%, promethazine, tiZANidine    Review of patient's allergies indicates:   Allergen Reactions    Albuterol  Palpitations    Colistin Anaphylaxis    Vancomycin analogues      Infusion reaction that does not resolve with slowing    Neupogen [filgrastim] Other (See Comments)     Ostealgia after five daily doses of 300 mcg.      Bactrim [sulfamethoxazole-trimethoprim] Hives    Ceftazidime Hives     Pt stated can tolerate cefapine not ceftazidime    Ceftazidime     Dronabinol Other (See Comments)     Mental changes/hallucinations    Haldol [haloperidol lactate] Other (See Comments)     Seizure like activity    Nsaids (non-steroidal anti-inflammatory drug)      Cannot have due to lung transplant    Adhesive Rash     Cloth tape- please use tegaderm or paper tape    Aztreonam Rash    Ciprofloxacin Nausea And Vomiting     Projectile N/V, per patient.  Unwilling to retry therapy.       Review of Systems   Constitutional: Positive for fatigue. Negative for appetite change, chills, diaphoresis and fever.   HENT: Positive for congestion (chronic, stable). Negative for postnasal drip, sore throat and trouble swallowing.    Respiratory: Positive for cough and shortness of breath.    Cardiovascular: Negative for chest pain, palpitations and leg swelling.   Gastrointestinal: Negative for abdominal distention, abdominal pain, constipation, diarrhea, nausea and vomiting.   Endocrine: Negative for polydipsia, polyphagia and polyuria.   Genitourinary: Negative for dysuria, frequency and urgency.   Musculoskeletal: Positive for myalgias. Negative for back pain, neck pain and neck stiffness.   Skin: Negative for color change, pallor and rash.   Neurological: Negative for dizziness, light-headedness and headaches (chronic, well controlled on current regimen).   Psychiatric/Behavioral: Negative for agitation, behavioral problems and confusion. The patient is nervous/anxious.      Objective:   Physical Exam   Constitutional: She is oriented to person, place, and time. She appears well-developed and well-nourished. She has a sickly  appearance. No distress.   HENT:   Head: Normocephalic and atraumatic.   Right Ear: External ear normal.   Left Ear: External ear normal.   Nose: Nose normal.   Mouth/Throat: Oropharynx is clear and moist. No oropharyngeal exudate.   Eyes: Conjunctivae and EOM are normal.   Neck: Normal range of motion. Neck supple. No JVD present.   Cardiovascular: Normal rate, regular rhythm, normal heart sounds and intact distal pulses. Exam reveals no gallop and no friction rub.   No murmur heard.  Pulmonary/Chest: Effort normal. She has decreased breath sounds in the right middle field, the right lower field, the left middle field and the left lower field. She has no wheezes. She has no rhonchi. She has no rales.   Abdominal: Soft. Bowel sounds are normal. She exhibits no distension. There is no tenderness. There is no guarding.   Musculoskeletal: Normal range of motion. She exhibits no edema.   Lymphadenopathy:     She has no cervical adenopathy.   Neurological: She is alert and oriented to person, place, and time. No cranial nerve deficit.   Skin: Skin is warm and dry. Capillary refill takes less than 2 seconds. No rash noted. She is not diaphoretic. No erythema. There is pallor.   Psychiatric: Her speech is normal and behavior is normal. Thought content normal. Her mood appears anxious. She exhibits a depressed mood.   Nursing note and vitals reviewed.        Vital Signs (Most Recent):  Temp: 98.7 °F (37.1 °C) (09/30/18 1151)  Pulse: 97 (09/30/18 1151)  Resp: 18 (09/30/18 1151)  BP: (!) 112/59 (09/30/18 1151)  SpO2: 95 % (09/30/18 1151) Vital Signs (24h Range):  Temp:  [98.1 °F (36.7 °C)-98.8 °F (37.1 °C)] 98.7 °F (37.1 °C)  Pulse:  [] 97  Resp:  [15-20] 18  SpO2:  [94 %-96 %] 95 %  BP: (102-115)/(58-76) 112/59     Weight: 58.7 kg (129 lb 6.6 oz)  Body mass index is 24.45 kg/m².      Intake/Output Summary (Last 24 hours) at 9/30/2018 1214  Last data filed at 9/30/2018 0908  Gross per 24 hour   Intake 1960 ml   Output  2400 ml   Net -440 ml       Significant Labs:  CBC:  Recent Labs   Lab  09/30/18   0500   WBC  5.34   RBC  2.82*   HGB  7.0*   HCT  24.3*   PLT  130*   MCV  86   MCH  24.8*   MCHC  28.8*     BMP:  Recent Labs   Lab  09/30/18   0500   NA  139   K  4.8   CL  110   CO2  24   BUN  19   CREATININE  1.2   CALCIUM  8.1*      Tacrolimus Levels:  Recent Labs   Lab  09/30/18   0500   TACROLIMUS  6.3     Microbiology:  Microbiology Results (last 7 days)     Procedure Component Value Units Date/Time    Respiratory Viral Panel by PCR [facility]; Nasal Swab [[id number]]     Order Status:  No result Specimen:  Respiratory     Culture, Respiratory with Gram Stain [[id number]]     Order Status:  No result Specimen:  Respiratory from Sputum, Expectorated           I have reviewed all pertinent labs within the past 24 hours.    Diagnostic Results:  CT: Reviewed - increased, multi-focal opacities within bilateral lung bases.       Assessment/Plan:     * Bronchiolitis obliterans syndrome    Concern for possible infectious etiology versus progression of CARLOS EDUARDO. Patient last received half pulse steroids x 4 doses in May 2018.   CT Chest 9/25 from [place] reviewed by Dr. Mae. Patient has recently received 2 weeks of IV Vancomycin and Meropenem at [place].     CT chest from [place] with new multi-focal opacities at the bilateral lung bases. Concerns for progression of CARLOS EDUARDO vs. Infection. Will continue empiric IV Vancomycin and Meropenem and plan for OR bronchoscopy on Monday, 10/1. RPP, respiratory culture ordered.  Maintain O2 sats > 88%.        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 History of MRSA and pseudomonas with recent history of prolonged antibiotic therapy.    Will continue current immunosuppression and prophylaxis. Concerns for possible  progression of CARLOS EDUARDO vs infectious etiology. Patient last received empiric half pulse steroids in May 2018. OR bronchoscopy scheduled for 10/1 at noon.         Prophylactic antibiotic    Continue Dapsone 100 mg daily.         Immunosuppression    Current outpatient regimen: Tacrolimus 3.5 mg, Prednisone 5 mg daily, and  mg BID.     Will continue current outpatient regimen. Continue daily monitoring of tacrolimus levels and dose adjust accordingly.        Sinusitis, chronic    Symptomatically at baseline. Continue with Allegra, Singulair, and Flonase.         Abnormal chest CT    CT chest from [place] with new multi-focal opacities at the bilateral lung bases. Concerns for progression of CARLOS EDUARDO vs infectious etiology. Plan for OR bronchoscopy on Monday. Continue empiric coverage for now with IV Vanc/Meropenem.             Shama Canales PA-C  Lung Transplant  [facility]

## 2018-09-30 NOTE — ASSESSMENT & PLAN NOTE
29yo woman w/a history of CF (c/b pancreatic insufficiency, sinus disease, MRSA/Pseudomonas colonization c/b numerous antibiotics allergies, and subsequent COPD/ESLD; s/p BOLT 6/12/2014, CMV D+/R-, simulect induction, on maintenance tacro/MMF/pred; c/b multiple episodes of MRSA/Pseudomonas pneumonia/sinusitis, 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 Differential given chest imaging findings includes: viral infection (CARVI vs CMV), IFI, rejection, or CARLOS EDUARDO (with the latter most likely given her clinical history). She remains stable on vanc/beatrice while awaiting BAL/biopsy on Monday.    Recs  - may continue vanc/beatrice for now and will taper off after bronch  - agree with pursuit of bronchoscopy for cultures and biopsies when able  - await pending fungal markers

## 2018-09-30 NOTE — SUBJECTIVE & OBJECTIVE
Subjective:     Interval History: No acute events overnight. Patient remains afebrile on Vanc/Meropenem. Patient continues to have dry, hacking cough, SOB, and fatigue unchanged from previous. OR bronchoscopy tomorrow afternoon.     Continuous Infusions:  Scheduled Meds:   amLODIPine  10 mg Oral Daily    ARIPiprazole  2 mg Oral Daily    calcium-vitamin D3  1 tablet Oral BID WM    dapsone  100 mg Oral Daily    diphenhydrAMINE  50 mg Intravenous Q12H    enoxaparin  40 mg Subcutaneous Daily    fexofenadine  180 mg Oral Daily    fluticasone  2 spray Each Nare Daily    gabapentin  1,100 mg Oral TID    levalbuterol  0.63 mg Nebulization Q8H WA    lipase-protease-amylase  4 capsule Oral TID WM    lisinopril  10 mg Oral Daily    magnesium oxide  400 mg Oral BID    meropenem (MERREM) IVPB  2 g Intravenous Q8H    montelukast  10 mg Oral Daily    morphine  15 mg Oral TID    multivit-min-FA-coenzyme Q10 100-5 mcg-mg  1 tablet Oral BID    mycophenolate  500 mg Oral BID    predniSONE  5 mg Oral Daily    tacrolimus  0.5 mg Oral BID    tacrolimus  3 mg Oral BID    topiramate  50 mg Oral BID    vancomycin 750 mg in dextrose 5 % 250 mL IVPB (ready to mix system)  750 mg Intravenous Q24H    venlafaxine  75 mg Oral QHS     PRN Meds:acetaminophen, butalbital-acetaminophen-caffeine -40 mg, diphenhydrAMINE, guaiFENesin, heparin, porcine (PF), ipratropium, LORazepam, ondansetron, oxyCODONE, polyethylene glycol, potassium chloride 10% **AND** potassium chloride 10% **AND** potassium chloride 10%, promethazine, tiZANidine    Review of patient's allergies indicates:   Allergen Reactions    Albuterol Palpitations    Colistin Anaphylaxis    Vancomycin analogues      Infusion reaction that does not resolve with slowing    Neupogen [filgrastim] Other (See Comments)     Ostealgia after five daily doses of 300 mcg.      Bactrim [sulfamethoxazole-trimethoprim] Hives    Ceftazidime Hives     Pt stated can tolerate  cefapine not ceftazidime    Ceftazidime     Dronabinol Other (See Comments)     Mental changes/hallucinations    Haldol [haloperidol lactate] Other (See Comments)     Seizure like activity    Nsaids (non-steroidal anti-inflammatory drug)      Cannot have due to lung transplant    Adhesive Rash     Cloth tape- please use tegaderm or paper tape    Aztreonam Rash    Ciprofloxacin Nausea And Vomiting     Projectile N/V, per patient.  Unwilling to retry therapy.       Review of Systems   Constitutional: Positive for fatigue. Negative for appetite change, chills, diaphoresis and fever.   HENT: Positive for congestion (chronic, stable). Negative for postnasal drip, sore throat and trouble swallowing.    Respiratory: Positive for cough and shortness of breath.    Cardiovascular: Negative for chest pain, palpitations and leg swelling.   Gastrointestinal: Negative for abdominal distention, abdominal pain, constipation, diarrhea, nausea and vomiting.   Endocrine: Negative for polydipsia, polyphagia and polyuria.   Genitourinary: Negative for dysuria, frequency and urgency.   Musculoskeletal: Positive for myalgias. Negative for back pain, neck pain and neck stiffness.   Skin: Negative for color change, pallor and rash.   Neurological: Negative for dizziness, light-headedness and headaches (chronic, well controlled on current regimen).   Psychiatric/Behavioral: Negative for agitation, behavioral problems and confusion. The patient is nervous/anxious.      Objective:   Physical Exam   Constitutional: She is oriented to person, place, and time. She appears well-developed and well-nourished. She has a sickly appearance. No distress.   HENT:   Head: Normocephalic and atraumatic.   Right Ear: External ear normal.   Left Ear: External ear normal.   Nose: Nose normal.   Mouth/Throat: Oropharynx is clear and moist. No oropharyngeal exudate.   Eyes: Conjunctivae and EOM are normal.   Neck: Normal range of motion. Neck supple. No  JVD present.   Cardiovascular: Normal rate, regular rhythm, normal heart sounds and intact distal pulses. Exam reveals no gallop and no friction rub.   No murmur heard.  Pulmonary/Chest: Effort normal. She has decreased breath sounds in the right middle field, the right lower field, the left middle field and the left lower field. She has no wheezes. She has no rhonchi. She has no rales.   Abdominal: Soft. Bowel sounds are normal. She exhibits no distension. There is no tenderness. There is no guarding.   Musculoskeletal: Normal range of motion. She exhibits no edema.   Lymphadenopathy:     She has no cervical adenopathy.   Neurological: She is alert and oriented to person, place, and time. No cranial nerve deficit.   Skin: Skin is warm and dry. Capillary refill takes less than 2 seconds. No rash noted. She is not diaphoretic. No erythema. There is pallor.   Psychiatric: Her speech is normal and behavior is normal. Thought content normal. Her mood appears anxious. She exhibits a depressed mood.   Nursing note and vitals reviewed.        Vital Signs (Most Recent):  Temp: 98.7 °F (37.1 °C) (09/30/18 1151)  Pulse: 97 (09/30/18 1151)  Resp: 18 (09/30/18 1151)  BP: (!) 112/59 (09/30/18 1151)  SpO2: 95 % (09/30/18 1151) Vital Signs (24h Range):  Temp:  [98.1 °F (36.7 °C)-98.8 °F (37.1 °C)] 98.7 °F (37.1 °C)  Pulse:  [] 97  Resp:  [15-20] 18  SpO2:  [94 %-96 %] 95 %  BP: (102-115)/(58-76) 112/59     Weight: 58.7 kg (129 lb 6.6 oz)  Body mass index is 24.45 kg/m².      Intake/Output Summary (Last 24 hours) at 9/30/2018 1214  Last data filed at 9/30/2018 0908  Gross per 24 hour   Intake 1960 ml   Output 2400 ml   Net -440 ml       Significant Labs:  CBC:  Recent Labs   Lab  09/30/18   0500   WBC  5.34   RBC  2.82*   HGB  7.0*   HCT  24.3*   PLT  130*   MCV  86   MCH  24.8*   MCHC  28.8*     BMP:  Recent Labs   Lab  09/30/18   0500   NA  139   K  4.8   CL  110   CO2  24   BUN  19   CREATININE  1.2   CALCIUM  8.1*       Tacrolimus Levels:  Recent Labs   Lab  09/30/18   0500   TACROLIMUS  6.3     Microbiology:  Microbiology Results (last 7 days)     Procedure Component Value Units Date/Time    Respiratory Viral Panel by PCR [facility]; Nasal Swab [[id number]]     Order Status:  No result Specimen:  Respiratory     Culture, Respiratory with Gram Stain [[id number]]     Order Status:  No result Specimen:  Respiratory from Sputum, Expectorated           I have reviewed all pertinent labs within the past 24 hours.    Diagnostic Results:  CT: Reviewed - increased, multi-focal opacities within bilateral lung bases.

## 2018-09-30 NOTE — PROGRESS NOTES
[facility]  Infectious Disease  Progress Note    Patient Name: Juanita Ibarra  MRN: [id number]  Admission Date: 9/27/2018  Length of Stay: 3 days  Attending Physician: Francisca Morin DO  Primary Care Provider: Jake Alvarez MD    Isolation Status: No active isolations  Assessment/Plan:      Pneumonia    29yo woman w/a history of CF (c/b pancreatic insufficiency, sinus disease, MRSA/Pseudomonas colonization c/b numerous antibiotics allergies, and subsequent COPD/ESLD; s/p BOLT 6/12/2014, CMV D+/R-, simulect induction, on maintenance tacro/MMF/pred; c/b multiple episodes of MRSA/Pseudomonas pneumonia/sinusitis, 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 Differential given chest imaging findings includes: viral infection (CARVI vs CMV), IFI, rejection, or CARLOS EDUARDO (with the latter most likely given her clinical history). She remains stable on vanc/beatrice while awaiting BAL/biopsy on Monday.    Recs  - may continue vanc/beatrice for now and will taper off after bronch  - agree with pursuit of bronchoscopy for cultures and biopsies when able  - await pending fungal markers          Thank you for your consult. I will follow-up with patient. Please contact us if you have any additional questions.    Wallace Garzon MD  Infectious Disease  [facility]    Subjective:     Principal Problem:Bronchiolitis obliterans syndrome    HPI:     Interval History:     Still has some SOB and cough  No GI symptoms     Review of Systems   Respiratory: Positive for cough and shortness of  breath.    All other systems reviewed and are negative.    Objective:     Vital Signs (Most Recent):  Temp: 98.7 °F (37.1 °C) (09/30/18 1151)  Pulse: 97 (09/30/18 1151)  Resp: 18 (09/30/18 1151)  BP: (!) 112/59 (09/30/18 1151)  SpO2: 95 % (09/30/18 1151) Vital Signs (24h Range):  Temp:  [98.1 °F (36.7 °C)-98.8 °F (37.1 °C)] 98.7 °F (37.1 °C)  Pulse:  [] 97  Resp:  [15-20] 18  SpO2:  [94 %-96 %] 95 %  BP: (102-115)/(58-76) 112/59     Weight: 58.7 kg (129 lb 6.6 oz)  Body mass index is 24.45 kg/m².    Estimated Creatinine Clearance: 57 mL/min (based on SCr of 1.2 mg/dL).    Physical Exam   Constitutional: She is oriented to person, place, and time.   Neck: No JVD present.   Cardiovascular: Normal rate and regular rhythm.   Pulmonary/Chest: Effort normal and breath sounds normal.   Abdominal: Soft. Bowel sounds are normal.   Musculoskeletal: She exhibits no edema.   Neurological: She is alert and oriented to person, place, and time.   Skin: Skin is warm and dry. No rash noted.   Psychiatric: She has a normal mood and affect. Her behavior is normal.       Significant Labs:   CBC:   Recent Labs   Lab  09/29/18   0415  09/30/18   0500   WBC  5.01  5.34   HGB  7.1*  7.0*   HCT  24.8*  24.3*   PLT  147*  130*       Significant Imaging: I have reviewed all pertinent imaging results/findings within the past 24 hours.

## 2018-09-30 NOTE — ASSESSMENT & PLAN NOTE
Concern for possible infectious etiology versus progression of CARLOS EDUARDO. Patient last received half pulse steroids x 4 doses in May 2018.   CT Chest 9/25 from [place] reviewed by Dr. Mae. Patient has recently received 2 weeks of IV Vancomycin and Meropenem at [place].     CT chest from [place] with new multi-focal opacities at the bilateral lung bases. Concerns for progression of CARLOS EDUARDO vs. Infection. Will continue empiric IV Vancomycin and Meropenem and plan for OR bronchoscopy on Monday, 10/1. RPP, respiratory culture ordered.  Maintain O2 sats > 88%.

## 2018-10-01 NOTE — ASSESSMENT & PLAN NOTE
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 Differential given chest imaging findings includes: viral infection (CARVI vs CMV), IFI, rejection, or CARLOS EDUARDO (with the latter most likely given her clinical history). She remains stable on vanc/beatrice while awaiting BAL/biopsy today.    - may continue vanc/beatrice for now and will taper off after bronch  - await pending fungal markers and will review bronch results from today when available

## 2018-10-01 NOTE — PROGRESS NOTES
Patient transferred to [facility] per stretcher by transport aide. Report called to receiving nurse. Chart with signed consents sent with patient. Will monitor for return.

## 2018-10-01 NOTE — NURSING TRANSFER
Nursing Transfer Note      10/1/2018     Transfer To: [id number] From PACU    Transfer via stretcher    Transfer with  to O2    Transported by Transport    Medicines sent: none    Chart send with patient: Yes    Notified: mother    Patient reassessed at: 10/1/18 @ 1400     Upon arrival to floor:

## 2018-10-01 NOTE — PROGRESS NOTES
[facility]  Infectious Disease  Progress Note    Patient Name: Juanita Ibarra  MRN: [id number]  Admission Date: 9/27/2018  Length of Stay: 4 days  Attending Physician: Francisca Morin DO  Primary Care Provider: Jake Alvarez MD    Isolation Status: No active isolations  Assessment/Plan:      Pneumonia    27yo woman w/a history of CF (c/b pancreatic insufficiency, sinus disease, MRSA/Pseudomonas colonization c/b numerous antibiotics allergies, and subsequent COPD/ESLD; s/p BOLT 6/12/2014, CMV D+/R-, simulect induction, on maintenance tacro/MMF/pred; c/b multiple episodes of MRSA/Pseudomonas pneumonia/sinusitis, 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 Differential given chest imaging findings includes: viral infection (CARVI vs CMV), IFI, rejection, or CARLOS EDUARDO (with the latter most likely given her clinical history). She remains stable on vanc/beatrice while awaiting BAL/biopsy today.    - may continue vanc/beatrice for now and will taper off after bronch  - await pending fungal markers and will review bronch results from today when available            Anticipated Disposition: pending improvement    Thank you for your consult. I will follow-up with patient. Please contact us if you have any additional questions.     Juanita Francis MD  Transplant ID Attending  [id number]    Juanita Francis MD  Infectious Disease  [facility]    Subjective:     Principal Problem:Bronchiolitis obliterans syndrome    HPI:     Interval History: Feels about the same. Just underwent  bronch. Afebrile.    Review of Systems   Constitutional: Positive for fatigue. Negative for appetite change, chills, diaphoresis and fever.   HENT: Positive for congestion (chronic, stable). Negative for postnasal drip, sore throat and trouble swallowing.    Respiratory: Positive for cough and shortness of breath.    Cardiovascular: Negative for chest pain, palpitations and leg swelling.   Gastrointestinal: Negative for abdominal distention, abdominal pain, constipation, diarrhea, nausea and vomiting.   Endocrine: Negative for polydipsia, polyphagia and polyuria.   Genitourinary: Negative for dysuria, frequency and urgency.   Musculoskeletal: Positive for myalgias. Negative for back pain, neck pain and neck stiffness.   Skin: Negative for color change, pallor and rash.   Neurological: Negative for dizziness, light-headedness and headaches (chronic, well controlled on current regimen).   Psychiatric/Behavioral: Negative for agitation, behavioral problems and confusion. The patient is nervous/anxious.      Objective:     Vital Signs (Most Recent):  Temp: 98.1 °F (36.7 °C) (10/01/18 1519)  Pulse: (!) 114 (10/01/18 1519)  Resp: 18 (10/01/18 1519)  BP: 135/85 (10/01/18 1519)  SpO2: 95 % (10/01/18 1519) Vital Signs (24h Range):  Temp:  [97.5 °F (36.4 °C)-98.9 °F (37.2 °C)] 98.1 °F (36.7 °C)  Pulse:  [] 114  Resp:  [16-20] 18  SpO2:  [93 %-99 %] 95 %  BP: (102-135)/(55-85) 135/85     Weight: 58.7 kg (129 lb 6.6 oz)  Body mass index is 24.45 kg/m².    Estimated Creatinine Clearance: 57 mL/min (based on SCr of 1.2 mg/dL).    Physical Exam   Constitutional: She is oriented to person, place, and time. She appears well-developed and well-nourished. She has a sickly appearance. No distress.   HENT:   Head: Normocephalic and atraumatic.   Right Ear: External ear normal.   Left Ear: External ear normal.   Nose: Nose normal.   Mouth/Throat: Oropharynx is clear and moist. No oropharyngeal exudate.   Eyes: Conjunctivae and EOM  are normal.   Neck: Normal range of motion. Neck supple. No JVD present.   Cardiovascular: Normal rate, regular rhythm, normal heart sounds and intact distal pulses. Exam reveals no gallop and no friction rub.   No murmur heard.  Pulmonary/Chest: Effort normal. She has decreased breath sounds in the right middle field, the right lower field, the left middle field and the left lower field. She has no wheezes. She has no rhonchi. She has no rales.   Abdominal: Soft. Bowel sounds are normal. She exhibits no distension. There is no tenderness. There is no guarding.   Musculoskeletal: Normal range of motion. She exhibits no edema.   Lymphadenopathy:     She has no cervical adenopathy.   Neurological: She is alert and oriented to person, place, and time. No cranial nerve deficit.   Skin: Skin is warm and dry. Capillary refill takes less than 2 seconds. No rash noted. She is not diaphoretic. No erythema. There is pallor.   Psychiatric: Her speech is normal and behavior is normal. Thought content normal. Her mood appears anxious. She exhibits a depressed mood.   Nursing note and vitals reviewed.      Significant Labs:   CBC:   Recent Labs   Lab  09/30/18   0500  10/01/18   0600   WBC  5.34  5.39   HGB  7.0*  6.8*   HCT  24.3*  23.7*   PLT  130*  104*     CMP:   Recent Labs   Lab  09/30/18   0500  10/01/18   0600   NA  139  141   K  4.8  4.8   CL  110  110   CO2  24  26   GLU  78  78   BUN  19  16   CREATININE  1.2  1.2   CALCIUM  8.1*  8.3*   PROT  5.7*  5.8*   ALBUMIN  3.0*  3.0*   BILITOT  0.3  0.3   ALKPHOS  148*  145*   AST  32  34   ALT  26  29   ANIONGAP  5*  5*   EGFRNONAA  >60.0  >60.0       Significant Imaging: I have reviewed all pertinent imaging results/findings within the past 24 hours.

## 2018-10-01 NOTE — ASSESSMENT & PLAN NOTE
Concern for possible infectious etiology versus progression of CARLOS EDUARDO. Patient last received half pulse steroids x 4 doses in May 2018.   CT Chest 9/25 from [place] reviewed by Dr. Mae. Patient has recently received 2 weeks of IV Vancomycin and Meropenem at [place].     CT chest from [place] with new multi-focal opacities at the bilateral lung bases. Concerns for progression of CARLOS EDUARDO vs. Infection. Will continue empiric IV Vancomycin and Meropenem and plan for OR bronchoscopy today.  RPP, respiratory culture ordered.  Maintain O2 sats > 88%.

## 2018-10-01 NOTE — PROGRESS NOTES
- Patient is AAOx4, independent and ambulatory. Patient educated to use call bell for assistance, verbalized understanding.   - Patient is on 4 L O2 NC - sats 96%   - Patient to OR for bronchoscopy this afternoon   - Afebrile, WBC 5.39 - continuing IV meropenem and vanc   - H/H 6.8/23.7 - per team no need for blood transfusion at this time    - CR 1.2 urine output 1,900 cc so far this shift   - Patient complained of chest pain related to coughing - continuing PRN oxycodone and scheduled morphine   - See flow sheet for complete assessment details

## 2018-10-01 NOTE — PROGRESS NOTES
[facility]  Lung Transplant  Progress Note - Floor    Patient Name: Juanita Ibarra  MRN: [id number]  Admission Date: 9/27/2018  Hospital Length of Stay: 4 days  Post-Operative Day: 1572  Attending Physician: Francisca Morin DO  Primary Care Provider: Jake Alvarez MD     Subjective:     Interval History: No acute events overnight.  For OR bronchoscopy today    Continuous Infusions:  Scheduled Meds:   amLODIPine  10 mg Oral Daily    ARIPiprazole  2 mg Oral Daily    calcium-vitamin D3  1 tablet Oral BID WM    dapsone  100 mg Oral Daily    diphenhydrAMINE  50 mg Intravenous Q12H    enoxaparin  40 mg Subcutaneous Daily    fexofenadine  180 mg Oral Daily    fluticasone  2 spray Each Nare Daily    gabapentin  1,100 mg Oral TID    levalbuterol  0.63 mg Nebulization Q8H WA    lipase-protease-amylase  4 capsule Oral TID WM    lisinopril  10 mg Oral Daily    magnesium oxide  400 mg Oral BID    meropenem (MERREM) IVPB  2 g Intravenous Q8H    montelukast  10 mg Oral Daily    morphine  15 mg Oral TID    multivit-min-FA-coenzyme Q10 100-5 mcg-mg  1 tablet Oral BID    mycophenolate  500 mg Oral BID    predniSONE  5 mg Oral Daily    promethazine (PHENERGAN) IVPB  12.5 mg Intravenous Once    tacrolimus  0.5 mg Oral BID    tacrolimus  3 mg Oral BID    topiramate  50 mg Oral BID    vancomycin 750 mg in dextrose 5 % 250 mL IVPB (ready to mix system)  750 mg Intravenous Q24H    venlafaxine  75 mg Oral QHS     PRN Meds:acetaminophen, butalbital-acetaminophen-caffeine -40 mg, diphenhydrAMINE, guaiFENesin, heparin, porcine (PF), ipratropium, LORazepam, ondansetron, oxyCODONE, polyethylene glycol, potassium chloride 10% **AND** potassium chloride 10% **AND** potassium chloride 10%, promethazine, tiZANidine    Review of patient's allergies indicates:   Allergen Reactions    Albuterol Palpitations    Colistin Anaphylaxis    Vancomycin analogues      Infusion reaction  that does not resolve with slowing    Neupogen [filgrastim] Other (See Comments)     Ostealgia after five daily doses of 300 mcg.      Bactrim [sulfamethoxazole-trimethoprim] Hives    Ceftazidime Hives     Pt stated can tolerate cefapine not ceftazidime    Ceftazidime     Dronabinol Other (See Comments)     Mental changes/hallucinations    Haldol [haloperidol lactate] Other (See Comments)     Seizure like activity    Nsaids (non-steroidal anti-inflammatory drug)      Cannot have due to lung transplant    Adhesive Rash     Cloth tape- please use tegaderm or paper tape    Aztreonam Rash    Ciprofloxacin Nausea And Vomiting     Projectile N/V, per patient.  Unwilling to retry therapy.       Review of Systems   Constitutional: Positive for fatigue. Negative for appetite change, chills, diaphoresis and fever.   HENT: Positive for congestion (chronic, stable). Negative for postnasal drip, sore throat and trouble swallowing.    Respiratory: Positive for cough and shortness of breath.    Cardiovascular: Negative for chest pain, palpitations and leg swelling.   Gastrointestinal: Negative for abdominal distention, abdominal pain, constipation, diarrhea, nausea and vomiting.   Endocrine: Negative for polydipsia, polyphagia and polyuria.   Genitourinary: Negative for dysuria, frequency and urgency.   Musculoskeletal: Positive for myalgias. Negative for back pain, neck pain and neck stiffness.   Skin: Negative for color change, pallor and rash.   Neurological: Negative for dizziness, light-headedness and headaches (chronic, well controlled on current regimen).   Psychiatric/Behavioral: Negative for agitation, behavioral problems and confusion. The patient is nervous/anxious.      Objective:   Physical Exam   Constitutional: She is oriented to person, place, and time. She appears well-developed and well-nourished. No distress.   HENT:   Head: Normocephalic and atraumatic.   Right Ear: External ear normal.   Left Ear:  External ear normal.   Nose: Nose normal.   Mouth/Throat: Oropharynx is clear and moist. No oropharyngeal exudate.   Eyes: Conjunctivae and EOM are normal.   Neck: Normal range of motion. Neck supple. No JVD present.   Cardiovascular: Normal rate, regular rhythm, normal heart sounds and intact distal pulses. Exam reveals no gallop and no friction rub.   No murmur heard.  Pulmonary/Chest: Effort normal. She has decreased breath sounds in the right middle field, the right lower field, the left middle field and the left lower field. She has no wheezes. She has no rhonchi. She has no rales.   Abdominal: Soft. Bowel sounds are normal. She exhibits no distension. There is no tenderness. There is no guarding.   Musculoskeletal: Normal range of motion. She exhibits no edema.   Lymphadenopathy:     She has no cervical adenopathy.   Neurological: She is alert and oriented to person, place, and time. No cranial nerve deficit.   Skin: Skin is warm and dry. Capillary refill takes less than 2 seconds. No rash noted. She is not diaphoretic. No erythema. There is pallor.   Psychiatric: Her speech is normal and behavior is normal. Thought content normal. Her mood appears not anxious. She does not exhibit a depressed mood.   Nursing note and vitals reviewed.        Vital Signs (Most Recent):  Temp: 98.6 °F (37 °C) (10/01/18 1106)  Pulse: 97 (10/01/18 1106)  Resp: 20 (10/01/18 1106)  BP: 106/65 (10/01/18 1106)  SpO2: 99 % (10/01/18 1106) Vital Signs (24h Range):  Temp:  [98.1 °F (36.7 °C)-98.9 °F (37.2 °C)] 98.6 °F (37 °C)  Pulse:  [75-98] 97  Resp:  [16-20] 20  SpO2:  [93 %-99 %] 99 %  BP: (106-119)/(55-79) 106/65     Weight: 58.7 kg (129 lb 6.6 oz)  Body mass index is 24.45 kg/m².      Intake/Output Summary (Last 24 hours) at 10/1/2018 1221  Last data filed at 10/1/2018 0900  Gross per 24 hour   Intake 1330 ml   Output 3150 ml   Net -1820 ml       Significant Labs:  CBC:  Recent Labs   Lab  10/01/18   0600   WBC  5.39   RBC  2.76*    HGB  6.8*   HCT  23.7*   PLT  104*   MCV  86   MCH  24.6*   MCHC  28.7*     BMP:  Recent Labs   Lab  10/01/18   0600   NA  141   K  4.8   CL  110   CO2  26   BUN  16   CREATININE  1.2   CALCIUM  8.3*      Tacrolimus Levels:  Recent Labs   Lab  10/01/18   0600   TACROLIMUS  5.1     Microbiology:  Microbiology Results (last 7 days)     Procedure Component Value Units Date/Time    Respiratory Viral Panel by PCR [facility]; Nasal Swab [[id number]]     Order Status:  No result Specimen:  Respiratory     Culture, Respiratory with Gram Stain [[id number]]     Order Status:  No result Specimen:  Respiratory from Sputum, Expectorated           Microbiology Results (last 7 days)     Procedure Component Value Units Date/Time    Respiratory Viral Panel by PCR [facility]; Nasal Swab [[id number]]     Order Status:  No result Specimen:  Respiratory     Culture, Respiratory with Gram Stain [[id number]]     Order Status:  No result Specimen:  Respiratory from Sputum, Expectorated         I have reviewed all pertinent labs within the past 24 hours.        Assessment/Plan:     * Bronchiolitis obliterans syndrome    Concern for possible infectious etiology versus progression of CARLOS EDUARDO. Patient last received half pulse steroids x 4 doses in May 2018.   CT Chest 9/25 from [place] reviewed by Dr. Mae. Patient has recently received 2 weeks of IV Vancomycin and Meropenem at [place].     CT chest from [place] with new multi-focal opacities at the bilateral lung bases. Concerns for progression of CARLOS EDUARDO vs. Infection. Will continue empiric IV Vancomycin and Meropenem and plan for OR bronchoscopy today.  RPP, respiratory culture ordered.  Maintain O2 sats > 88%.        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 History of MRSA and pseudomonas with recent history of  prolonged antibiotic therapy.    Will continue current immunosuppression and prophylaxis. Concerns for possible progression of CARLOS EDUARDO vs infectious etiology. Patient last received empiric half pulse steroids in May 2018. OR bronchoscopy scheduled for 10/1 at noon.         Immunosuppression    Current outpatient regimen: Tacrolimus 3.5 mg, Prednisone 5 mg daily, and  mg BID.     Will continue current outpatient regimen. Continue daily monitoring of tacrolimus levels and dose adjust accordingly.        Prophylactic antibiotic    Continue Dapsone 100 mg daily.         Sinusitis, chronic    Symptomatically at baseline. Continue with Allegra, Singulair, and Flonase.         Abnormal chest CT    CT chest from [place] with new multi-focal opacities at the bilateral lung bases. Concerns for progression of CARLOS EDUARDO vs infectious etiology. Plan for OR bronchoscopy today. Continue empiric coverage for now with IV Vanc/Meropenem.             Reyes Will NP  Lung Transplant  [facility]

## 2018-10-01 NOTE — PROGRESS NOTES
Patient returned to unit per stretcher by transport aide. VSS. No acute distress noted. Will continue to monitor.

## 2018-10-01 NOTE — ASSESSMENT & PLAN NOTE
CT chest from [place] with new multi-focal opacities at the bilateral lung bases. Concerns for progression of CARLOS EDUARDO vs infectious etiology. Plan for OR bronchoscopy today. Continue empiric coverage for now with IV Vanc/Meropenem.

## 2018-10-01 NOTE — TRANSFER OF CARE
"Anesthesia Transfer of Care Note    Patient: Juanita Ibarra    Procedure(s) Performed: Procedure(s) (LRB):  BRONCHOSCOPY (N/A)    Patient location: PACU    Anesthesia Type: general    Transport from OR: Transported from OR on 6-10 L/min O2 by face mask with adequate spontaneous ventilation    Post pain: adequate analgesia    Post assessment: no apparent anesthetic complications    Post vital signs: stable    Level of consciousness: lethargic and responds to stimulation    Nausea/Vomiting: no nausea/vomiting    Complications: none    Transfer of care protocol was followed      Last vitals:   Visit Vitals  /63   Pulse 95   Temp 36.4 °C (97.5 °F) (Temporal)   Resp 18   Ht 5' 1" (1.549 m)   Wt 58.7 kg (129 lb 6.6 oz)   LMP 10/02/2016   SpO2 99%   Breastfeeding? No   BMI 24.45 kg/m²     "

## 2018-10-01 NOTE — SUBJECTIVE & OBJECTIVE
Subjective:     Interval History: No acute events overnight.  For OR bronchoscopy today    Continuous Infusions:  Scheduled Meds:   amLODIPine  10 mg Oral Daily    ARIPiprazole  2 mg Oral Daily    calcium-vitamin D3  1 tablet Oral BID WM    dapsone  100 mg Oral Daily    diphenhydrAMINE  50 mg Intravenous Q12H    enoxaparin  40 mg Subcutaneous Daily    fexofenadine  180 mg Oral Daily    fluticasone  2 spray Each Nare Daily    gabapentin  1,100 mg Oral TID    levalbuterol  0.63 mg Nebulization Q8H WA    lipase-protease-amylase  4 capsule Oral TID WM    lisinopril  10 mg Oral Daily    magnesium oxide  400 mg Oral BID    meropenem (MERREM) IVPB  2 g Intravenous Q8H    montelukast  10 mg Oral Daily    morphine  15 mg Oral TID    multivit-min-FA-coenzyme Q10 100-5 mcg-mg  1 tablet Oral BID    mycophenolate  500 mg Oral BID    predniSONE  5 mg Oral Daily    promethazine (PHENERGAN) IVPB  12.5 mg Intravenous Once    tacrolimus  0.5 mg Oral BID    tacrolimus  3 mg Oral BID    topiramate  50 mg Oral BID    vancomycin 750 mg in dextrose 5 % 250 mL IVPB (ready to mix system)  750 mg Intravenous Q24H    venlafaxine  75 mg Oral QHS     PRN Meds:acetaminophen, butalbital-acetaminophen-caffeine -40 mg, diphenhydrAMINE, guaiFENesin, heparin, porcine (PF), ipratropium, LORazepam, ondansetron, oxyCODONE, polyethylene glycol, potassium chloride 10% **AND** potassium chloride 10% **AND** potassium chloride 10%, promethazine, tiZANidine    Review of patient's allergies indicates:   Allergen Reactions    Albuterol Palpitations    Colistin Anaphylaxis    Vancomycin analogues      Infusion reaction that does not resolve with slowing    Neupogen [filgrastim] Other (See Comments)     Ostealgia after five daily doses of 300 mcg.      Bactrim [sulfamethoxazole-trimethoprim] Hives    Ceftazidime Hives     Pt stated can tolerate cefapine not ceftazidime    Ceftazidime     Dronabinol Other (See Comments)      Mental changes/hallucinations    Haldol [haloperidol lactate] Other (See Comments)     Seizure like activity    Nsaids (non-steroidal anti-inflammatory drug)      Cannot have due to lung transplant    Adhesive Rash     Cloth tape- please use tegaderm or paper tape    Aztreonam Rash    Ciprofloxacin Nausea And Vomiting     Projectile N/V, per patient.  Unwilling to retry therapy.       Review of Systems   Constitutional: Positive for fatigue. Negative for appetite change, chills, diaphoresis and fever.   HENT: Positive for congestion (chronic, stable). Negative for postnasal drip, sore throat and trouble swallowing.    Respiratory: Positive for cough and shortness of breath.    Cardiovascular: Negative for chest pain, palpitations and leg swelling.   Gastrointestinal: Negative for abdominal distention, abdominal pain, constipation, diarrhea, nausea and vomiting.   Endocrine: Negative for polydipsia, polyphagia and polyuria.   Genitourinary: Negative for dysuria, frequency and urgency.   Musculoskeletal: Positive for myalgias. Negative for back pain, neck pain and neck stiffness.   Skin: Negative for color change, pallor and rash.   Neurological: Negative for dizziness, light-headedness and headaches (chronic, well controlled on current regimen).   Psychiatric/Behavioral: Negative for agitation, behavioral problems and confusion. The patient is nervous/anxious.      Objective:   Physical Exam   Constitutional: She is oriented to person, place, and time. She appears well-developed and well-nourished. No distress.   HENT:   Head: Normocephalic and atraumatic.   Right Ear: External ear normal.   Left Ear: External ear normal.   Nose: Nose normal.   Mouth/Throat: Oropharynx is clear and moist. No oropharyngeal exudate.   Eyes: Conjunctivae and EOM are normal.   Neck: Normal range of motion. Neck supple. No JVD present.   Cardiovascular: Normal rate, regular rhythm, normal heart sounds and intact distal pulses. Exam  reveals no gallop and no friction rub.   No murmur heard.  Pulmonary/Chest: Effort normal. She has decreased breath sounds in the right middle field, the right lower field, the left middle field and the left lower field. She has no wheezes. She has no rhonchi. She has no rales.   Abdominal: Soft. Bowel sounds are normal. She exhibits no distension. There is no tenderness. There is no guarding.   Musculoskeletal: Normal range of motion. She exhibits no edema.   Lymphadenopathy:     She has no cervical adenopathy.   Neurological: She is alert and oriented to person, place, and time. No cranial nerve deficit.   Skin: Skin is warm and dry. Capillary refill takes less than 2 seconds. No rash noted. She is not diaphoretic. No erythema. There is pallor.   Psychiatric: Her speech is normal and behavior is normal. Thought content normal. Her mood appears not anxious. She does not exhibit a depressed mood.   Nursing note and vitals reviewed.        Vital Signs (Most Recent):  Temp: 98.6 °F (37 °C) (10/01/18 1106)  Pulse: 97 (10/01/18 1106)  Resp: 20 (10/01/18 1106)  BP: 106/65 (10/01/18 1106)  SpO2: 99 % (10/01/18 1106) Vital Signs (24h Range):  Temp:  [98.1 °F (36.7 °C)-98.9 °F (37.2 °C)] 98.6 °F (37 °C)  Pulse:  [75-98] 97  Resp:  [16-20] 20  SpO2:  [93 %-99 %] 99 %  BP: (106-119)/(55-79) 106/65     Weight: 58.7 kg (129 lb 6.6 oz)  Body mass index is 24.45 kg/m².      Intake/Output Summary (Last 24 hours) at 10/1/2018 1221  Last data filed at 10/1/2018 0900  Gross per 24 hour   Intake 1330 ml   Output 3150 ml   Net -1820 ml       Significant Labs:  CBC:  Recent Labs   Lab  10/01/18   0600   WBC  5.39   RBC  2.76*   HGB  6.8*   HCT  23.7*   PLT  104*   MCV  86   MCH  24.6*   MCHC  28.7*     BMP:  Recent Labs   Lab  10/01/18   0600   NA  141   K  4.8   CL  110   CO2  26   BUN  16   CREATININE  1.2   CALCIUM  8.3*      Tacrolimus Levels:  Recent Labs   Lab  10/01/18   0600   TACROLIMUS  5.1     Microbiology:  Microbiology  Results (last 7 days)     Procedure Component Value Units Date/Time    Respiratory Viral Panel by PCR [facility]; Nasal Swab [[id number]]     Order Status:  No result Specimen:  Respiratory     Culture, Respiratory with Gram Stain [[id number]]     Order Status:  No result Specimen:  Respiratory from Sputum, Expectorated           Microbiology Results (last 7 days)     Procedure Component Value Units Date/Time    Respiratory Viral Panel by PCR [facility]; Nasal Swab [[id number]]     Order Status:  No result Specimen:  Respiratory     Culture, Respiratory with Gram Stain [[id number]]     Order Status:  No result Specimen:  Respiratory from Sputum, Expectorated         I have reviewed all pertinent labs within the past 24 hours.

## 2018-10-01 NOTE — PROGRESS NOTES
Call placed to Antonio ARIZA for surgical consent, left message on his cell. Will continue to monitor.

## 2018-10-01 NOTE — SUBJECTIVE & OBJECTIVE
Interval History: Feels about the same. Just underwent bronch. Afebrile.    Review of Systems   Constitutional: Positive for fatigue. Negative for appetite change, chills, diaphoresis and fever.   HENT: Positive for congestion (chronic, stable). Negative for postnasal drip, sore throat and trouble swallowing.    Respiratory: Positive for cough and shortness of breath.    Cardiovascular: Negative for chest pain, palpitations and leg swelling.   Gastrointestinal: Negative for abdominal distention, abdominal pain, constipation, diarrhea, nausea and vomiting.   Endocrine: Negative for polydipsia, polyphagia and polyuria.   Genitourinary: Negative for dysuria, frequency and urgency.   Musculoskeletal: Positive for myalgias. Negative for back pain, neck pain and neck stiffness.   Skin: Negative for color change, pallor and rash.   Neurological: Negative for dizziness, light-headedness and headaches (chronic, well controlled on current regimen).   Psychiatric/Behavioral: Negative for agitation, behavioral problems and confusion. The patient is nervous/anxious.      Objective:     Vital Signs (Most Recent):  Temp: 98.1 °F (36.7 °C) (10/01/18 1519)  Pulse: (!) 114 (10/01/18 1519)  Resp: 18 (10/01/18 1519)  BP: 135/85 (10/01/18 1519)  SpO2: 95 % (10/01/18 1519) Vital Signs (24h Range):  Temp:  [97.5 °F (36.4 °C)-98.9 °F (37.2 °C)] 98.1 °F (36.7 °C)  Pulse:  [] 114  Resp:  [16-20] 18  SpO2:  [93 %-99 %] 95 %  BP: (102-135)/(55-85) 135/85     Weight: 58.7 kg (129 lb 6.6 oz)  Body mass index is 24.45 kg/m².    Estimated Creatinine Clearance: 57 mL/min (based on SCr of 1.2 mg/dL).    Physical Exam   Constitutional: She is oriented to person, place, and time. She appears well-developed and well-nourished. She has a sickly appearance. No distress.   HENT:   Head: Normocephalic and atraumatic.   Right Ear: External ear normal.   Left Ear: External ear normal.   Nose: Nose normal.   Mouth/Throat: Oropharynx is clear and moist.  No oropharyngeal exudate.   Eyes: Conjunctivae and EOM are normal.   Neck: Normal range of motion. Neck supple. No JVD present.   Cardiovascular: Normal rate, regular rhythm, normal heart sounds and intact distal pulses. Exam reveals no gallop and no friction rub.   No murmur heard.  Pulmonary/Chest: Effort normal. She has decreased breath sounds in the right middle field, the right lower field, the left middle field and the left lower field. She has no wheezes. She has no rhonchi. She has no rales.   Abdominal: Soft. Bowel sounds are normal. She exhibits no distension. There is no tenderness. There is no guarding.   Musculoskeletal: Normal range of motion. She exhibits no edema.   Lymphadenopathy:     She has no cervical adenopathy.   Neurological: She is alert and oriented to person, place, and time. No cranial nerve deficit.   Skin: Skin is warm and dry. Capillary refill takes less than 2 seconds. No rash noted. She is not diaphoretic. No erythema. There is pallor.   Psychiatric: Her speech is normal and behavior is normal. Thought content normal. Her mood appears anxious. She exhibits a depressed mood.   Nursing note and vitals reviewed.      Significant Labs:   CBC:   Recent Labs   Lab  09/30/18   0500  10/01/18   0600   WBC  5.34  5.39   HGB  7.0*  6.8*   HCT  24.3*  23.7*   PLT  130*  104*     CMP:   Recent Labs   Lab  09/30/18   0500  10/01/18   0600   NA  139  141   K  4.8  4.8   CL  110  110   CO2  24  26   GLU  78  78   BUN  19  16   CREATININE  1.2  1.2   CALCIUM  8.1*  8.3*   PROT  5.7*  5.8*   ALBUMIN  3.0*  3.0*   BILITOT  0.3  0.3   ALKPHOS  148*  145*   AST  32  34   ALT  26  29   ANIONGAP  5*  5*   EGFRNONAA  >60.0  >60.0       Significant Imaging: I have reviewed all pertinent imaging results/findings within the past 24 hours.

## 2018-10-02 NOTE — PLAN OF CARE
Problem: Patient Care Overview  Goal: Plan of Care Review  Outcome: Ongoing (interventions implemented as appropriate)  Pt AAOx4, VSS, afebrile. Pain medicine administered as scheduled, along with PRN. 1U infusing for H&H of 6.5/23.2. Continue Dapsone, Meropenem. Still awaiting results from bronch yest. Bed in low/locked position, call light/personal belongings within reach, non-slip socks on when OOB, will continue to monitor.

## 2018-10-02 NOTE — PROGRESS NOTES
[facility]  Lung Transplant  Progress Note - Floor    Patient Name: Juanita Ibarra  MRN: [id number]  Admission Date: 9/27/2018  Hospital Length of Stay: 5 days  Post-Operative Day: 1573  Attending Physician: Francisca Morin DO  Primary Care Provider: Jake Alvarez MD     Subjective:     Interval History: No acute events overnight.  Had bronchoscopy yesterday.  Still requiring oxygen    Continuous Infusions:  Scheduled Meds:   amLODIPine  10 mg Oral Daily    ARIPiprazole  2 mg Oral Daily    calcium-vitamin D3  1 tablet Oral BID WM    dapsone  100 mg Oral Daily    diphenhydrAMINE  50 mg Intravenous QHS    enoxaparin  40 mg Subcutaneous Daily    fexofenadine  180 mg Oral Daily    fluticasone  2 spray Each Nare Daily    gabapentin  1,100 mg Oral TID    levalbuterol  0.63 mg Nebulization Q8H WA    lipase-protease-amylase  4 capsule Oral TID WM    lisinopril  10 mg Oral Daily    magnesium oxide  400 mg Oral BID    meropenem (MERREM) IVPB  2 g Intravenous Q8H    montelukast  10 mg Oral Daily    morphine  15 mg Oral TID    multivit-min-FA-coenzyme Q10 100-5 mcg-mg  1 tablet Oral BID    mycophenolate  500 mg Oral BID    predniSONE  5 mg Oral Daily    tacrolimus  0.5 mg Oral BID    tacrolimus  3 mg Oral BID    topiramate  50 mg Oral BID    vancomycin 750 mg in dextrose 5 % 250 mL IVPB (ready to mix system)  750 mg Intravenous Q24H    venlafaxine  75 mg Oral QHS     PRN Meds:sodium chloride, acetaminophen, butalbital-acetaminophen-caffeine -40 mg, diphenhydrAMINE, guaiFENesin, heparin, porcine (PF), ipratropium, LORazepam, ondansetron, oxyCODONE, polyethylene glycol, potassium chloride 10% **AND** potassium chloride 10% **AND** potassium chloride 10%, promethazine, tiZANidine    Review of patient's allergies indicates:   Allergen Reactions    Albuterol Palpitations    Colistin Anaphylaxis    Vancomycin analogues      Infusion reaction that does not  resolve with slowing    Neupogen [filgrastim] Other (See Comments)     Ostealgia after five daily doses of 300 mcg.      Bactrim [sulfamethoxazole-trimethoprim] Hives    Ceftazidime Hives     Pt stated can tolerate cefapine not ceftazidime    Ceftazidime     Dronabinol Other (See Comments)     Mental changes/hallucinations    Haldol [haloperidol lactate] Other (See Comments)     Seizure like activity    Nsaids (non-steroidal anti-inflammatory drug)      Cannot have due to lung transplant    Adhesive Rash     Cloth tape- please use tegaderm or paper tape    Aztreonam Rash    Ciprofloxacin Nausea And Vomiting     Projectile N/V, per patient.  Unwilling to retry therapy.       Review of Systems   Constitutional: Positive for fatigue. Negative for appetite change, chills, diaphoresis and fever.   HENT: Positive for congestion (chronic, stable). Negative for postnasal drip, sore throat and trouble swallowing.    Respiratory: Positive for cough and shortness of breath.    Cardiovascular: Negative for chest pain, palpitations and leg swelling.   Gastrointestinal: Negative for abdominal distention, abdominal pain, constipation, diarrhea, nausea and vomiting.   Endocrine: Negative for polydipsia, polyphagia and polyuria.   Genitourinary: Negative for dysuria, frequency and urgency.   Musculoskeletal: Positive for myalgias. Negative for back pain, neck pain and neck stiffness.   Skin: Negative for color change, pallor and rash.   Neurological: Negative for dizziness, light-headedness and headaches (chronic, well controlled on current regimen).   Psychiatric/Behavioral: Negative for agitation, behavioral problems and confusion. The patient is not nervous/anxious.      Objective:   Physical Exam   Constitutional: She is oriented to person, place, and time. She appears well-developed and well-nourished. No distress.   HENT:   Head: Normocephalic and atraumatic.   Right Ear: External ear normal.   Left Ear: External ear  normal.   Nose: Nose normal.   Mouth/Throat: Oropharynx is clear and moist. No oropharyngeal exudate.   Eyes: Conjunctivae and EOM are normal.   Neck: Normal range of motion. Neck supple. No JVD present.   Cardiovascular: Normal rate, regular rhythm, normal heart sounds and intact distal pulses. Exam reveals no gallop and no friction rub.   No murmur heard.  Pulmonary/Chest: Effort normal. She has decreased breath sounds in the right middle field, the right lower field, the left middle field and the left lower field. She has no wheezes. She has no rhonchi. She has no rales.   Abdominal: Soft. Bowel sounds are normal. She exhibits no distension. There is no tenderness. There is no guarding.   Musculoskeletal: Normal range of motion. She exhibits no edema.   Lymphadenopathy:     She has no cervical adenopathy.   Neurological: She is alert and oriented to person, place, and time. No cranial nerve deficit.   Skin: Skin is warm and dry. Capillary refill takes less than 2 seconds. No rash noted. She is not diaphoretic. No erythema. There is pallor.   Psychiatric: Her speech is normal and behavior is normal. Thought content normal. Her mood appears not anxious. She does not exhibit a depressed mood.   Nursing note and vitals reviewed.        Vital Signs (Most Recent):  Temp: 98.5 °F (36.9 °C) (10/02/18 0754)  Pulse: 96 (10/02/18 0914)  Resp: 16 (10/02/18 0914)  BP: 109/69 (10/02/18 0754)  SpO2: 96 % (10/02/18 0914) Vital Signs (24h Range):  Temp:  [97.5 °F (36.4 °C)-98.6 °F (37 °C)] 98.5 °F (36.9 °C)  Pulse:  [] 96  Resp:  [13-20] 16  SpO2:  [93 %-99 %] 96 %  BP: (102-135)/(62-85) 109/69     Weight: 58.7 kg (129 lb 6.6 oz)  Body mass index is 24.45 kg/m².      Intake/Output Summary (Last 24 hours) at 10/2/2018 1016  Last data filed at 10/1/2018 2100  Gross per 24 hour   Intake 960 ml   Output 1550 ml   Net -590 ml       Significant Labs:  CBC:  Recent Labs   Lab  10/02/18   0600   WBC  8.86   RBC  2.75*   HGB  6.5*    HCT  23.2*   PLT  123*   MCV  84   MCH  23.6*   MCHC  28.0*     BMP:  Recent Labs   Lab  10/02/18   0600   NA  141   K  4.5   CL  112*   CO2  27   BUN  16   CREATININE  1.1   CALCIUM  8.0*      Tacrolimus Levels:  Recent Labs   Lab  10/02/18   0600   TACROLIMUS  5.5     Microbiology:  Microbiology Results (last 7 days)     Procedure Component Value Units Date/Time    Culture, Respiratory [[id number]] Collected:  10/01/18 1319    Order Status:  Completed Specimen:  Respiratory from BAL, L Updated:  10/02/18 0943     Respiratory Culture No Growth     Gram Stain (Respiratory) Rare WBC's     Gram Stain (Respiratory) No organisms seen    Narrative:       Bronchial Wash    Culture, Respiratory [[id number]] Collected:  10/01/18 1319    Order Status:  Completed Specimen:  Respiratory from BAL, L Updated:  10/02/18 0939     Respiratory Culture Normal respiratory anibal     Gram Stain (Respiratory) Rare WBC's     Gram Stain (Respiratory) No organisms seen    Narrative:       Bronchial Wash    Fungus culture [[id number]] Collected:  10/01/18 1319    Order Status:  Sent Specimen:  Respiratory from BAL, L Updated:  10/01/18 1454    AFB Culture & Smear [[id number]] Collected:  10/01/18 1319    Order Status:  Sent Specimen:  Respiratory from [facility] Updated:  10/01/18 1454    Fungus culture [[id number]] Collected:  10/01/18 1319    Order Status:  Sent Specimen:  Respiratory from [facility] Updated:  10/01/18 1444    AFB Culture & Smear [[id number]] Collected:  10/01/18 1319    Order Status:  Sent Specimen:  Respiratory from [facility] Updated:  10/01/18 1443    Respiratory Viral Panel by PCR [facility]; Nasal Swab [[id number]]     Order Status:  No result Specimen:  Respiratory     Culture, Respiratory with Gram Stain [[id number]]     Order Status:  No result Specimen:  Respiratory from Sputum, Expectorated           Microbiology Results (last 7 days)     Procedure Component Value Units Date/Time    Culture, Respiratory [[id number]]  Collected:  10/01/18 1319    Order Status:  Completed Specimen:  Respiratory from BAL, [facility] Updated:  10/02/18 0943     Respiratory Culture No Growth     Gram Stain (Respiratory) Rare WBC's     Gram Stain (Respiratory) No organisms seen    Narrative:       Bronchial Wash    Culture, Respiratory [[id number]] Collected:  10/01/18 1319    Order Status:  Completed Specimen:  Respiratory from BAL, [facility] Updated:  10/02/18 0939     Respiratory Culture Normal respiratory anibal     Gram Stain (Respiratory) Rare WBC's     Gram Stain (Respiratory) No organisms seen    Narrative:       Bronchial Wash    Fungus culture [[id number]] Collected:  10/01/18 1319    Order Status:  Sent Specimen:  Respiratory from BAL, [facility] Updated:  10/01/18 1454    AFB Culture & Smear [[id number]] Collected:  10/01/18 1319    Order Status:  Sent Specimen:  Respiratory from [facility] Updated:  10/01/18 1454    Fungus culture [[id number]] Collected:  10/01/18 1319    Order Status:  Sent Specimen:  Respiratory from [facility] Updated:  10/01/18 1444    AFB Culture & Smear [[id number]] Collected:  10/01/18 1319    Order Status:  Sent Specimen:  Respiratory from [facility] Updated:  10/01/18 1443    Respiratory Viral Panel by PCR [facility]; Nasal Swab [[id number]]     Order Status:  No result Specimen:  Respiratory     Culture, Respiratory with Gram Stain [[id number]]     Order Status:  No result Specimen:  Respiratory from Sputum, Expectorated         I have reviewed all pertinent labs within the past 24 hours.        Assessment/Plan:     * Bronchiolitis obliterans syndrome    Concern for possible infectious etiology versus progression of CARLOS EDUARDO. Patient last received half pulse steroids x 4 doses in May 2018.   CT Chest 9/25 from [place] reviewed by Dr. Mae. Patient has recently received 2 weeks of IV Vancomycin and Meropenem at [place].     CT chest from [place] with new multi-focal opacities at the bilateral lung bases. Concerns for progression of CARLOS EDUARDO vs.  Infection. Will continue empiric IV Vancomycin and Meropenem while awaiting results from BAL from 10/1 bronchoscopy.  Maintain O2 sats > 88%.        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 History of MRSA and pseudomonas with recent history of prolonged antibiotic therapy.    Will continue current immunosuppression and prophylaxis. Concerns for possible progression of CARLOS EDUARDO vs infectious etiology. Patient last received empiric half pulse steroids in May 2018. Bronchoscopy performed yesterday, awaiting BAL results.        Immunosuppression    Current outpatient regimen: Tacrolimus 3.5 mg, Prednisone 5 mg daily, and  mg BID.     Will continue current outpatient regimen. Continue daily monitoring of tacrolimus levels and dose adjust accordingly.        Prophylactic antibiotic    Continue Dapsone 100 mg daily.         Sinusitis, chronic    Symptomatically at baseline. Continue with Allegra, Singulair, and Flonase.         Anemia    Possibly playing a part in supplemental oxygen requirements.  Will transfuse 1 unit of PRBC today         Reyes Will NP  Lung Transplant  [facility]

## 2018-10-02 NOTE — SUBJECTIVE & OBJECTIVE
Subjective:     Interval History: No acute events overnight.  Had bronchoscopy yesterday.  Still requiring oxygen    Continuous Infusions:  Scheduled Meds:   amLODIPine  10 mg Oral Daily    ARIPiprazole  2 mg Oral Daily    calcium-vitamin D3  1 tablet Oral BID WM    dapsone  100 mg Oral Daily    diphenhydrAMINE  50 mg Intravenous QHS    enoxaparin  40 mg Subcutaneous Daily    fexofenadine  180 mg Oral Daily    fluticasone  2 spray Each Nare Daily    gabapentin  1,100 mg Oral TID    levalbuterol  0.63 mg Nebulization Q8H WA    lipase-protease-amylase  4 capsule Oral TID WM    lisinopril  10 mg Oral Daily    magnesium oxide  400 mg Oral BID    meropenem (MERREM) IVPB  2 g Intravenous Q8H    montelukast  10 mg Oral Daily    morphine  15 mg Oral TID    multivit-min-FA-coenzyme Q10 100-5 mcg-mg  1 tablet Oral BID    mycophenolate  500 mg Oral BID    predniSONE  5 mg Oral Daily    tacrolimus  0.5 mg Oral BID    tacrolimus  3 mg Oral BID    topiramate  50 mg Oral BID    vancomycin 750 mg in dextrose 5 % 250 mL IVPB (ready to mix system)  750 mg Intravenous Q24H    venlafaxine  75 mg Oral QHS     PRN Meds:sodium chloride, acetaminophen, butalbital-acetaminophen-caffeine -40 mg, diphenhydrAMINE, guaiFENesin, heparin, porcine (PF), ipratropium, LORazepam, ondansetron, oxyCODONE, polyethylene glycol, potassium chloride 10% **AND** potassium chloride 10% **AND** potassium chloride 10%, promethazine, tiZANidine    Review of patient's allergies indicates:   Allergen Reactions    Albuterol Palpitations    Colistin Anaphylaxis    Vancomycin analogues      Infusion reaction that does not resolve with slowing    Neupogen [filgrastim] Other (See Comments)     Ostealgia after five daily doses of 300 mcg.      Bactrim [sulfamethoxazole-trimethoprim] Hives    Ceftazidime Hives     Pt stated can tolerate cefapine not ceftazidime    Ceftazidime     Dronabinol Other (See Comments)     Mental  changes/hallucinations    Haldol [haloperidol lactate] Other (See Comments)     Seizure like activity    Nsaids (non-steroidal anti-inflammatory drug)      Cannot have due to lung transplant    Adhesive Rash     Cloth tape- please use tegaderm or paper tape    Aztreonam Rash    Ciprofloxacin Nausea And Vomiting     Projectile N/V, per patient.  Unwilling to retry therapy.       Review of Systems   Constitutional: Positive for fatigue. Negative for appetite change, chills, diaphoresis and fever.   HENT: Positive for congestion (chronic, stable). Negative for postnasal drip, sore throat and trouble swallowing.    Respiratory: Positive for cough and shortness of breath.    Cardiovascular: Negative for chest pain, palpitations and leg swelling.   Gastrointestinal: Negative for abdominal distention, abdominal pain, constipation, diarrhea, nausea and vomiting.   Endocrine: Negative for polydipsia, polyphagia and polyuria.   Genitourinary: Negative for dysuria, frequency and urgency.   Musculoskeletal: Positive for myalgias. Negative for back pain, neck pain and neck stiffness.   Skin: Negative for color change, pallor and rash.   Neurological: Negative for dizziness, light-headedness and headaches (chronic, well controlled on current regimen).   Psychiatric/Behavioral: Negative for agitation, behavioral problems and confusion. The patient is not nervous/anxious.      Objective:   Physical Exam   Constitutional: She is oriented to person, place, and time. She appears well-developed and well-nourished. No distress.   HENT:   Head: Normocephalic and atraumatic.   Right Ear: External ear normal.   Left Ear: External ear normal.   Nose: Nose normal.   Mouth/Throat: Oropharynx is clear and moist. No oropharyngeal exudate.   Eyes: Conjunctivae and EOM are normal.   Neck: Normal range of motion. Neck supple. No JVD present.   Cardiovascular: Normal rate, regular rhythm, normal heart sounds and intact distal pulses. Exam  reveals no gallop and no friction rub.   No murmur heard.  Pulmonary/Chest: Effort normal. She has decreased breath sounds in the right middle field, the right lower field, the left middle field and the left lower field. She has no wheezes. She has no rhonchi. She has no rales.   Abdominal: Soft. Bowel sounds are normal. She exhibits no distension. There is no tenderness. There is no guarding.   Musculoskeletal: Normal range of motion. She exhibits no edema.   Lymphadenopathy:     She has no cervical adenopathy.   Neurological: She is alert and oriented to person, place, and time. No cranial nerve deficit.   Skin: Skin is warm and dry. Capillary refill takes less than 2 seconds. No rash noted. She is not diaphoretic. No erythema. There is pallor.   Psychiatric: Her speech is normal and behavior is normal. Thought content normal. Her mood appears not anxious. She does not exhibit a depressed mood.   Nursing note and vitals reviewed.        Vital Signs (Most Recent):  Temp: 98.5 °F (36.9 °C) (10/02/18 0754)  Pulse: 96 (10/02/18 0914)  Resp: 16 (10/02/18 0914)  BP: 109/69 (10/02/18 0754)  SpO2: 96 % (10/02/18 0914) Vital Signs (24h Range):  Temp:  [97.5 °F (36.4 °C)-98.6 °F (37 °C)] 98.5 °F (36.9 °C)  Pulse:  [] 96  Resp:  [13-20] 16  SpO2:  [93 %-99 %] 96 %  BP: (102-135)/(62-85) 109/69     Weight: 58.7 kg (129 lb 6.6 oz)  Body mass index is 24.45 kg/m².      Intake/Output Summary (Last 24 hours) at 10/2/2018 1016  Last data filed at 10/1/2018 2100  Gross per 24 hour   Intake 960 ml   Output 1550 ml   Net -590 ml       Significant Labs:  CBC:  Recent Labs   Lab  10/02/18   0600   WBC  8.86   RBC  2.75*   HGB  6.5*   HCT  23.2*   PLT  123*   MCV  84   MCH  23.6*   MCHC  28.0*     BMP:  Recent Labs   Lab  10/02/18   0600   NA  141   K  4.5   CL  112*   CO2  27   BUN  16   CREATININE  1.1   CALCIUM  8.0*      Tacrolimus Levels:  Recent Labs   Lab  10/02/18   0600   TACROLIMUS  5.5     Microbiology:  Microbiology  Results (last 7 days)     Procedure Component Value Units Date/Time    Culture, Respiratory [[id number]] Collected:  10/01/18 1319    Order Status:  Completed Specimen:  Respiratory from [facility] Updated:  10/02/18 0943     Respiratory Culture No Growth     Gram Stain (Respiratory) Rare WBC's     Gram Stain (Respiratory) No organisms seen    Narrative:       Bronchial Wash    Culture, Respiratory [[id number]] Collected:  10/01/18 1319    Order Status:  Completed Specimen:  Respiratory from [facility] Updated:  10/02/18 0939     Respiratory Culture Normal respiratory anibal     Gram Stain (Respiratory) Rare WBC's     Gram Stain (Respiratory) No organisms seen    Narrative:       Bronchial Wash    Fungus culture [[id number]] Collected:  10/01/18 1319    Order Status:  Sent Specimen:  Respiratory from [facility] Updated:  10/01/18 1454    AFB Culture & Smear [[id number]] Collected:  10/01/18 1319    Order Status:  Sent Specimen:  Respiratory from [facility] Updated:  10/01/18 1454    Fungus culture [[id number]] Collected:  10/01/18 1319    Order Status:  Sent Specimen:  Respiratory from [facility] Updated:  10/01/18 1444    AFB Culture & Smear [[id number]] Collected:  10/01/18 1319    Order Status:  Sent Specimen:  Respiratory from [facility] Updated:  10/01/18 1443    Respiratory Viral Panel by PCR [facility]; Nasal Swab [[id number]]     Order Status:  No result Specimen:  Respiratory     Culture, Respiratory with Gram Stain [[id number]]     Order Status:  No result Specimen:  Respiratory from Sputum, Expectorated           Microbiology Results (last 7 days)     Procedure Component Value Units Date/Time    Culture, Respiratory [[id number]] Collected:  10/01/18 1319    Order Status:  Completed Specimen:  Respiratory from [facility] Updated:  10/02/18 0943     Respiratory Culture No Growth     Gram Stain (Respiratory) Rare WBC's     Gram Stain (Respiratory) No organisms seen    Narrative:       Bronchial Wash    Culture, Respiratory [[id number]]  Collected:  10/01/18 1319    Order Status:  Completed Specimen:  Respiratory from [facility] Updated:  10/02/18 0939     Respiratory Culture Normal respiratory anibal     Gram Stain (Respiratory) Rare WBC's     Gram Stain (Respiratory) No organisms seen    Narrative:       Bronchial Wash    Fungus culture [[id number]] Collected:  10/01/18 1319    Order Status:  Sent Specimen:  Respiratory from [facility] Updated:  10/01/18 1454    AFB Culture & Smear [[id number]] Collected:  10/01/18 1319    Order Status:  Sent Specimen:  Respiratory from [facility] Updated:  10/01/18 1454    Fungus culture [[id number]] Collected:  10/01/18 1319    Order Status:  Sent Specimen:  Respiratory from [facility] Updated:  10/01/18 1444    AFB Culture & Smear [[id number]] Collected:  10/01/18 1319    Order Status:  Sent Specimen:  Respiratory from [facility] Updated:  10/01/18 1443    Respiratory Viral Panel by PCR [facility]; Nasal Swab [[id number]]     Order Status:  No result Specimen:  Respiratory     Culture, Respiratory with Gram Stain [[id number]]     Order Status:  No result Specimen:  Respiratory from Sputum, Expectorated         I have reviewed all pertinent labs within the past 24 hours.

## 2018-10-02 NOTE — ASSESSMENT & PLAN NOTE
Concern for possible infectious etiology versus progression of CARLOS EDUARDO. Patient last received half pulse steroids x 4 doses in May 2018.   CT Chest 9/25 from [facility] reviewed by Dr. Mae. Patient has recently received 2 weeks of IV Vancomycin and Meropenem at [facility].     CT chest from [facility] with new multi-focal opacities at the bilateral lung bases. Concerns for progression of CARLOS EDUARDO vs. Infection. Will continue empiric IV Vancomycin and Meropenem while awaiting results from BAL from 10/1 bronchoscopy.  Maintain O2 sats > 88%.

## 2018-10-02 NOTE — ASSESSMENT & PLAN NOTE
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 Differential given chest imaging findings includes: viral infection (CARVI vs CMV), IFI, rejection, or CARLOS EDUARDO (with the latter most likely given her clinical history). She remains stable on vanc/beatrice while awaiting BAL/biopsy results that are negative for infectious pathogens to date.    - may continue vanc/beatrice for now and will taper off tomorrow if unremarkable at 48h  - await pending fungal markers and will review bronch final results when available

## 2018-10-02 NOTE — ASSESSMENT & PLAN NOTE
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 History of MRSA and pseudomonas with recent history of prolonged antibiotic therapy.    Will continue current immunosuppression and prophylaxis. Concerns for possible progression of CARLOS EDUARDO vs infectious etiology. Patient last received empiric half pulse steroids in May 2018. Bronchoscopy performed yesterday, awaiting BAL results.

## 2018-10-02 NOTE — PLAN OF CARE
Problem: Patient Care Overview  Goal: Plan of Care Review  Outcome: Ongoing (interventions implemented as appropriate)  Pt is AAOx4 in bed wearing non-skid footwear, bed in low/locked position and with call bell within reach. Pt reminded to use call bell to call for assistance, pt verbalizes understanding. Pt is afebrile at this time. Proper hand hygiene performed before and after pt care activities. PRN Oxycodone administered. Denies any pain or discomfort at this time.

## 2018-10-02 NOTE — PROGRESS NOTES
[facility]  Infectious Disease  Progress Note    Patient Name: Juanita Ibarra  MRN: [id number]  Admission Date: 9/27/2018  Length of Stay: 5 days  Attending Physician: Francisca Morin DO  Primary Care Provider: Jake Alvarez MD    Isolation Status: No active isolations  Assessment/Plan:      Pneumonia    27yo woman w/a history of CF (c/b pancreatic insufficiency, sinus disease, MRSA/Pseudomonas colonization c/b numerous antibiotics allergies, and subsequent COPD/ESLD; s/p BOLT 6/12/2014, CMV D+/R-, simulect induction, on maintenance tacro/MMF/pred; c/b multiple episodes of MRSA/Pseudomonas pneumonia/sinusitis, 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 Differential given chest imaging findings includes: viral infection (CARVI vs CMV), IFI, rejection, or CARLOS EDUARDO (with the latter most likely given her clinical history). She remains stable on vanc/beatrice while awaiting BAL/biopsy results that are negative for infectious pathogens to date.    - may continue vanc/beatrice for now and will taper off tomorrow if unremarkable at 48h  - await pending fungal markers and will review bronch final results when available            Anticipated Disposition: pending improvement    Thank you for your consult. I will follow-up with patient. Please contact us if you have any additional questions.     Juanita Francis MD  Transplant ID Attending  [id number]    Juanita Francis MD  Infectious Disease  [facility]    Subjective:     Principal Problem:Bronchiolitis obliterans  syndrome    HPI:     Interval History: Breathing a bit easier which she attributes to suctioning of secretions during bronch. Cultures negative so far.    Review of Systems   Constitutional: Positive for fatigue. Negative for appetite change, chills, diaphoresis and fever.   HENT: Positive for congestion (chronic, stable). Negative for postnasal drip, sore throat and trouble swallowing.    Respiratory: Positive for cough and shortness of breath.    Cardiovascular: Negative for chest pain, palpitations and leg swelling.   Gastrointestinal: Negative for abdominal distention, abdominal pain, constipation, diarrhea, nausea and vomiting.   Endocrine: Negative for polydipsia, polyphagia and polyuria.   Genitourinary: Negative for dysuria, frequency and urgency.   Musculoskeletal: Negative for back pain, myalgias, neck pain and neck stiffness.   Skin: Negative for color change, pallor and rash.   Neurological: Negative for dizziness, light-headedness and headaches (chronic, well controlled on current regimen).   Psychiatric/Behavioral: Negative for agitation, behavioral problems and confusion. The patient is not nervous/anxious.      Objective:     Vital Signs (Most Recent):  Temp: 98.2 °F (36.8 °C) (10/02/18 1611)  Pulse: 99 (10/02/18 1611)  Resp: 13 (10/02/18 1611)  BP: 111/64 (10/02/18 1611)  SpO2: (!) 93 % (10/02/18 1611) Vital Signs (24h Range):  Temp:  [96.4 °F (35.8 °C)-98.5 °F (36.9 °C)] 98.2 °F (36.8 °C)  Pulse:  [] 99  Resp:  [13-18] 13  SpO2:  [92 %-96 %] 93 %  BP: (103-126)/(64-82) 111/64     Weight: 58.7 kg (129 lb 6.6 oz)  Body mass index is 24.45 kg/m².    Estimated Creatinine Clearance: 62.2 mL/min (based on SCr of 1.1 mg/dL).    Physical Exam   Constitutional: She is oriented to person, place, and time. She appears well-developed and well-nourished. She has a sickly appearance. No distress.   HENT:   Head: Normocephalic and atraumatic.   Right Ear: External ear normal.   Left Ear: External ear  normal.   Nose: Nose normal.   Mouth/Throat: Oropharynx is clear and moist. No oropharyngeal exudate.   Eyes: Conjunctivae and EOM are normal.   Neck: Normal range of motion. Neck supple. No JVD present.   Cardiovascular: Normal rate, regular rhythm, normal heart sounds and intact distal pulses. Exam reveals no gallop and no friction rub.   No murmur heard.  Pulmonary/Chest: Effort normal. She has decreased breath sounds in the right middle field, the right lower field, the left middle field and the left lower field. She has no wheezes. She has no rhonchi. She has no rales.   Abdominal: Soft. Bowel sounds are normal. She exhibits no distension. There is no tenderness. There is no guarding.   Musculoskeletal: Normal range of motion. She exhibits no edema.   Lymphadenopathy:     She has no cervical adenopathy.   Neurological: She is alert and oriented to person, place, and time. No cranial nerve deficit.   Skin: Skin is warm and dry. Capillary refill takes less than 2 seconds. No rash noted. She is not diaphoretic. No erythema. There is pallor.   Psychiatric: Her speech is normal and behavior is normal. Thought content normal. Her mood appears anxious. She exhibits a depressed mood.   Nursing note and vitals reviewed.      Significant Labs:   CBC:   Recent Labs   Lab  10/01/18   0600  10/02/18   0600   WBC  5.39  8.86   HGB  6.8*  6.5*   HCT  23.7*  23.2*   PLT  104*  123*     CMP:   Recent Labs   Lab  10/01/18   0600  10/02/18   0600   NA  141  141   K  4.8  4.5   CL  110  112*   CO2  26  27   GLU  78  72   BUN  16  16   CREATININE  1.2  1.1   CALCIUM  8.3*  8.0*   PROT  5.8*  5.9*   ALBUMIN  3.0*  3.2*   BILITOT  0.3  0.3   ALKPHOS  145*  148*   AST  34  34   ALT  29  34   ANIONGAP  5*  2*   EGFRNONAA  >60.0  >60.0       Significant Imaging: I have reviewed all pertinent imaging results/findings within the past 24 hours.

## 2018-10-02 NOTE — SUBJECTIVE & OBJECTIVE
Interval History: Breathing a bit easier which she attributes to suctioning of secretions during bronch. Cultures negative so far.    Review of Systems   Constitutional: Positive for fatigue. Negative for appetite change, chills, diaphoresis and fever.   HENT: Positive for congestion (chronic, stable). Negative for postnasal drip, sore throat and trouble swallowing.    Respiratory: Positive for cough and shortness of breath.    Cardiovascular: Negative for chest pain, palpitations and leg swelling.   Gastrointestinal: Negative for abdominal distention, abdominal pain, constipation, diarrhea, nausea and vomiting.   Endocrine: Negative for polydipsia, polyphagia and polyuria.   Genitourinary: Negative for dysuria, frequency and urgency.   Musculoskeletal: Negative for back pain, myalgias, neck pain and neck stiffness.   Skin: Negative for color change, pallor and rash.   Neurological: Negative for dizziness, light-headedness and headaches (chronic, well controlled on current regimen).   Psychiatric/Behavioral: Negative for agitation, behavioral problems and confusion. The patient is not nervous/anxious.      Objective:     Vital Signs (Most Recent):  Temp: 98.2 °F (36.8 °C) (10/02/18 1611)  Pulse: 99 (10/02/18 1611)  Resp: 13 (10/02/18 1611)  BP: 111/64 (10/02/18 1611)  SpO2: (!) 93 % (10/02/18 1611) Vital Signs (24h Range):  Temp:  [96.4 °F (35.8 °C)-98.5 °F (36.9 °C)] 98.2 °F (36.8 °C)  Pulse:  [] 99  Resp:  [13-18] 13  SpO2:  [92 %-96 %] 93 %  BP: (103-126)/(64-82) 111/64     Weight: 58.7 kg (129 lb 6.6 oz)  Body mass index is 24.45 kg/m².    Estimated Creatinine Clearance: 62.2 mL/min (based on SCr of 1.1 mg/dL).    Physical Exam   Constitutional: She is oriented to person, place, and time. She appears well-developed and well-nourished. She has a sickly appearance. No distress.   HENT:   Head: Normocephalic and atraumatic.   Right Ear: External ear normal.   Left Ear: External ear normal.   Nose: Nose  normal.   Mouth/Throat: Oropharynx is clear and moist. No oropharyngeal exudate.   Eyes: Conjunctivae and EOM are normal.   Neck: Normal range of motion. Neck supple. No JVD present.   Cardiovascular: Normal rate, regular rhythm, normal heart sounds and intact distal pulses. Exam reveals no gallop and no friction rub.   No murmur heard.  Pulmonary/Chest: Effort normal. She has decreased breath sounds in the right middle field, the right lower field, the left middle field and the left lower field. She has no wheezes. She has no rhonchi. She has no rales.   Abdominal: Soft. Bowel sounds are normal. She exhibits no distension. There is no tenderness. There is no guarding.   Musculoskeletal: Normal range of motion. She exhibits no edema.   Lymphadenopathy:     She has no cervical adenopathy.   Neurological: She is alert and oriented to person, place, and time. No cranial nerve deficit.   Skin: Skin is warm and dry. Capillary refill takes less than 2 seconds. No rash noted. She is not diaphoretic. No erythema. There is pallor.   Psychiatric: Her speech is normal and behavior is normal. Thought content normal. Her mood appears anxious. She exhibits a depressed mood.   Nursing note and vitals reviewed.      Significant Labs:   CBC:   Recent Labs   Lab  10/01/18   0600  10/02/18   0600   WBC  5.39  8.86   HGB  6.8*  6.5*   HCT  23.7*  23.2*   PLT  104*  123*     CMP:   Recent Labs   Lab  10/01/18   0600  10/02/18   0600   NA  141  141   K  4.8  4.5   CL  110  112*   CO2  26  27   GLU  78  72   BUN  16  16   CREATININE  1.2  1.1   CALCIUM  8.3*  8.0*   PROT  5.8*  5.9*   ALBUMIN  3.0*  3.2*   BILITOT  0.3  0.3   ALKPHOS  145*  148*   AST  34  34   ALT  29  34   ANIONGAP  5*  2*   EGFRNONAA  >60.0  >60.0       Significant Imaging: I have reviewed all pertinent imaging results/findings within the past 24 hours.

## 2018-10-03 PROBLEM — R93.89 ABNORMAL CHEST CT: Status: RESOLVED | Noted: 2018-01-01 | Resolved: 2018-01-01

## 2018-10-03 PROBLEM — R73.9 STEROID-INDUCED HYPERGLYCEMIA: Status: ACTIVE | Noted: 2018-01-01

## 2018-10-03 PROBLEM — T38.0X5A STEROID-INDUCED HYPERGLYCEMIA: Status: ACTIVE | Noted: 2018-01-01

## 2018-10-03 PROBLEM — T38.7X5A: Status: ACTIVE | Noted: 2018-01-01

## 2018-10-03 NOTE — PROGRESS NOTES
[facility]  Lung Transplant  Progress Note - Floor    Patient Name: Juanita Ibarra  MRN: [id number]  Admission Date: 9/27/2018  Hospital Length of Stay: 6 days  Post-Operative Day: 1574  Attending Physician: Francisca Morin DO  Primary Care Provider: Jake Alvarez MD     Subjective:     Interval History: No acute events overnight. Reports improvement in SOB with 1 unit PRBCs and pulse steroids. Continues to complain of dry cough unchanged from yesterday. No other complaints today.     Continuous Infusions:  Scheduled Meds:   amLODIPine  10 mg Oral Daily    ARIPiprazole  2 mg Oral Daily    calcium-vitamin D3  1 tablet Oral BID WM    dapsone  100 mg Oral Daily    diphenhydrAMINE  50 mg Intravenous QHS    enoxaparin  40 mg Subcutaneous Daily    fexofenadine  180 mg Oral Daily    fluticasone  2 spray Each Nare Daily    gabapentin  1,100 mg Oral TID    levalbuterol  0.63 mg Nebulization Q8H WA    lipase-protease-amylase  4 capsule Oral TID WM    lisinopril  10 mg Oral Daily    magnesium oxide  400 mg Oral BID    meropenem (MERREM) IVPB  2 g Intravenous Q8H    methylPREDNISolone (SOLU-Medrol) IVPB (doses > 250 mg)   Intravenous Daily    montelukast  10 mg Oral Daily    morphine  15 mg Oral TID    multivit-min-FA-coenzyme Q10 100-5 mcg-mg  1 tablet Oral BID    mycophenolate  500 mg Oral BID    predniSONE  5 mg Oral Daily    tacrolimus  0.5 mg Oral BID    tacrolimus  3 mg Oral BID    topiramate  50 mg Oral BID    venlafaxine  75 mg Oral QHS     PRN Meds:sodium chloride, acetaminophen, butalbital-acetaminophen-caffeine -40 mg, dextrose 50%, dextrose 50%, diphenhydrAMINE, glucagon (human recombinant), glucose, glucose, guaiFENesin, heparin, porcine (PF), insulin aspart U-100, ipratropium, LORazepam, ondansetron, oxyCODONE, polyethylene glycol, potassium chloride 10% **AND** potassium chloride 10% **AND** potassium chloride 10%, promethazine,  tiZANidine    Review of patient's allergies indicates:   Allergen Reactions    Albuterol Palpitations    Colistin Anaphylaxis    Vancomycin analogues      Infusion reaction that does not resolve with slowing    Neupogen [filgrastim] Other (See Comments)     Ostealgia after five daily doses of 300 mcg.      Bactrim [sulfamethoxazole-trimethoprim] Hives    Ceftazidime Hives     Pt stated can tolerate cefapine not ceftazidime    Ceftazidime     Dronabinol Other (See Comments)     Mental changes/hallucinations    Haldol [haloperidol lactate] Other (See Comments)     Seizure like activity    Nsaids (non-steroidal anti-inflammatory drug)      Cannot have due to lung transplant    Adhesive Rash     Cloth tape- please use tegaderm or paper tape    Aztreonam Rash    Ciprofloxacin Nausea And Vomiting     Projectile N/V, per patient.  Unwilling to retry therapy.       Review of Systems   Constitutional: Positive for fatigue. Negative for appetite change, chills, diaphoresis and fever.   HENT: Positive for congestion (chronic, stable). Negative for postnasal drip, sore throat and trouble swallowing.    Respiratory: Positive for cough and shortness of breath. Negative for chest tightness, wheezing and stridor.    Cardiovascular: Negative for chest pain, palpitations and leg swelling.   Gastrointestinal: Negative for abdominal distention, abdominal pain, constipation, diarrhea, nausea and vomiting.   Endocrine: Negative for polydipsia, polyphagia and polyuria.   Genitourinary: Negative for dysuria, frequency, hematuria and urgency.   Musculoskeletal: Positive for myalgias. Negative for back pain, neck pain and neck stiffness.   Skin: Negative for color change, pallor and rash.   Allergic/Immunologic: Positive for immunocompromised state.   Neurological: Negative for dizziness, weakness, light-headedness and headaches (chronic, well controlled on current regimen).   Psychiatric/Behavioral: Negative for agitation,  behavioral problems and confusion. The patient is not nervous/anxious.      Objective:   Physical Exam   Constitutional: She is oriented to person, place, and time. She appears well-developed and well-nourished. No distress.   HENT:   Head: Normocephalic and atraumatic.   Right Ear: External ear normal.   Left Ear: External ear normal.   Nose: Nose normal.   Mouth/Throat: Oropharynx is clear and moist. No oropharyngeal exudate.   Eyes: Conjunctivae and EOM are normal.   Neck: Normal range of motion. Neck supple. No JVD present.   Cardiovascular: Normal rate, regular rhythm, normal heart sounds and intact distal pulses. Exam reveals no gallop and no friction rub.   No murmur heard.  Pulmonary/Chest: Effort normal. She has decreased breath sounds in the right middle field, the right lower field, the left middle field and the left lower field. She has no wheezes. She has no rhonchi. She has no rales.   Abdominal: Soft. Bowel sounds are normal. She exhibits no distension. There is no tenderness. There is no guarding.   Musculoskeletal: Normal range of motion. She exhibits no edema.   Lymphadenopathy:     She has no cervical adenopathy.   Neurological: She is alert and oriented to person, place, and time. No cranial nerve deficit.   Skin: Skin is warm and dry. Capillary refill takes less than 2 seconds. No rash noted. She is not diaphoretic. No erythema. There is pallor.   Psychiatric: Her speech is normal and behavior is normal. Thought content normal. Her mood appears not anxious. She does not exhibit a depressed mood.   Nursing note and vitals reviewed.        Vital Signs (Most Recent):  Temp: 98.3 °F (36.8 °C) (10/03/18 1204)  Pulse: 83 (10/03/18 1204)  Resp: 18 (10/03/18 1204)  BP: 122/75 (10/03/18 1204)  SpO2: (!) 92 % (10/03/18 1204) Vital Signs (24h Range):  Temp:  [98 °F (36.7 °C)-98.6 °F (37 °C)] 98.3 °F (36.8 °C)  Pulse:  [] 83  Resp:  [13-18] 18  SpO2:  [92 %-94 %] 92 %  BP: (111-138)/(64-90) 122/75      Weight: 58.7 kg (129 lb 6.6 oz)  Body mass index is 24.45 kg/m².      Intake/Output Summary (Last 24 hours) at 10/3/2018 1454  Last data filed at 10/3/2018 0400  Gross per 24 hour   Intake --   Output 2650 ml   Net -2650 ml       Significant Labs:  CBC:  Recent Labs   Lab  10/03/18   0600   WBC  6.22   RBC  3.58*   HGB  9.0*   HCT  31.0*   PLT  123*   MCV  87   MCH  25.1*   MCHC  29.0*     BMP:  Recent Labs   Lab  10/03/18   0600   NA  139   K  4.8   CL  109   CO2  22*   BUN  15   CREATININE  1.3   CALCIUM  8.8      Tacrolimus Levels:  Recent Labs   Lab  10/03/18   0600   TACROLIMUS  6.6     Microbiology:  Microbiology Results (last 7 days)     Procedure Component Value Units Date/Time    Fungus culture [[id number]] Collected:  10/01/18 1319    Order Status:  Completed Specimen:  Respiratory from [facility] Updated:  10/03/18 1413     Fungus (Mycology) Culture --     YEAST   Few  Identification pending      Narrative:       Bronchial Wash    Fungus culture [[id number]] Collected:  10/01/18 1319    Order Status:  Completed Specimen:  Respiratory from [facility] Updated:  10/03/18 1409     Fungus (Mycology) Culture --     YEAST   Many  Identification pending      Narrative:       Bronchial Wash    Culture, Respiratory [[id number]] Collected:  10/01/18 1319    Order Status:  Completed Specimen:  Respiratory from [facility] Updated:  10/03/18 0748     Respiratory Culture Normal respiratory anibal     Gram Stain (Respiratory) Rare WBC's     Gram Stain (Respiratory) No organisms seen    Narrative:       Bronchial Wash    Culture, Respiratory [[id number]] Collected:  10/01/18 1319    Order Status:  Completed Specimen:  Respiratory from [facility] Updated:  10/03/18 0741     Respiratory Culture Normal respiratory anibal     Gram Stain (Respiratory) Rare WBC's     Gram Stain (Respiratory) No organisms seen    Narrative:       Bronchial Wash    AFB Culture & Smear [[id number]] Collected:  10/01/18 1319    Order Status:  Completed  Specimen:  Respiratory from [facility] Updated:  10/02/18 2127     AFB Culture & Smear Culture in progress     AFB CULTURE STAIN No acid fast bacilli seen.    Narrative:       Bronchial Wash    AFB Culture & Smear [[id number]] Collected:  10/01/18 1319    Order Status:  Completed Specimen:  Respiratory from [facility] Updated:  10/02/18 2127     AFB Culture & Smear Culture in progress     AFB CULTURE STAIN No acid fast bacilli seen.    Narrative:       Bronchial Wash    Respiratory Viral Panel by PCR [facility]; Nasal Swab [[id number]]     Order Status:  No result Specimen:  Respiratory     Culture, Respiratory with Gram Stain [[id number]]     Order Status:  No result Specimen:  Respiratory from Sputum, Expectorated           Microbiology Results (last 7 days)     Procedure Component Value Units Date/Time    Fungus culture [[id number]] Collected:  10/01/18 1319    Order Status:  Completed Specimen:  Respiratory from [facility] Updated:  10/03/18 1413     Fungus (Mycology) Culture --     YEAST   Few  Identification pending      Narrative:       Bronchial Wash    Fungus culture [[id number]] Collected:  10/01/18 1319    Order Status:  Completed Specimen:  Respiratory from [facility] Updated:  10/03/18 1409     Fungus (Mycology) Culture --     YEAST   Many  Identification pending      Narrative:       Bronchial Wash    Culture, Respiratory [[id number]] Collected:  10/01/18 1319    Order Status:  Completed Specimen:  Respiratory from [facility] Updated:  10/03/18 0748     Respiratory Culture Normal respiratory anibal     Gram Stain (Respiratory) Rare WBC's     Gram Stain (Respiratory) No organisms seen    Narrative:       Bronchial Wash    Culture, Respiratory [[id number]] Collected:  10/01/18 1319    Order Status:  Completed Specimen:  Respiratory from [facility] Updated:  10/03/18 0741     Respiratory Culture Normal respiratory anibal     Gram Stain (Respiratory) Rare WBC's     Gram Stain (Respiratory) No organisms seen    Narrative:        Bronchial Wash    AFB Culture & Smear [[id number]] Collected:  10/01/18 1319    Order Status:  Completed Specimen:  Respiratory from BAL, [facility] Updated:  10/02/18 2127     AFB Culture & Smear Culture in progress     AFB CULTURE STAIN No acid fast bacilli seen.    Narrative:       Bronchial Wash    AFB Culture & Smear [[id number]] Collected:  10/01/18 1319    Order Status:  Completed Specimen:  Respiratory from BAL, [facility] Updated:  10/02/18 2127     AFB Culture & Smear Culture in progress     AFB CULTURE STAIN No acid fast bacilli seen.    Narrative:       Bronchial Wash    Respiratory Viral Panel by PCR [facility]; Nasal Swab [[id number]]     Order Status:  No result Specimen:  Respiratory     Culture, Respiratory with Gram Stain [[id number]]     Order Status:  No result Specimen:  Respiratory from Sputum, Expectorated         I have reviewed all pertinent labs within the past 24 hours.        Assessment/Plan:     * Bronchiolitis obliterans syndrome    Concern for possible infectious etiology versus progression of CARLOS EDUARDO. Patient last received half pulse steroids x 4 doses in May 2018.   CT Chest 9/25 from [facility] reviewed by Dr. Mae. Patient has recently received 2 weeks of IV Vancomycin and Meropenem at [facility].     CT chest from [facility] with new multi-focal opacities at the bilateral lung bases. Concerns for progression of CARLOS EDUARDO vs. Infection. Will continue empiric IV Vancomycin and Meropenem while awaiting finalized results from BAL from 10/1 bronchoscopy. Will likely d/c antibiotics if no growth in 48 hours per ID recs. Placed on empiric pulse steroid therapy for suspected rejection (day 2/3) based on cell count from BAL. Maintain O2 sats > 88%. Will determine if patient qualifies for home oxygen prior to anticipated discharge on 10/5.        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 Transplant history complicated by A1 Rejection 8/2014, recurrent C glabrata positive sputum, Pseudomonas pneumonia in  02/2015 resolved, CMV viremia 7/2015, and CARLOS EDUARDO. History of MRSA and pseudomonas with recent history of prolonged antibiotic therapy.    Will continue current immunosuppression and prophylaxis. Concerns for possible progression of CARLOS EDUARDO vs infectious etiology. Patient last received empiric half pulse steroids in May 2018. Bronchoscopy performed 10/1, BAL with NGTD. Started on pulse steroids for suspected rejection based on cell count. Today is day 2/3 of pulse steroids. Will likely d/c home on Friday.         Prophylactic antibiotic    Continue Dapsone 100 mg daily.         Immunosuppression    Current outpatient regimen: Tacrolimus 3.5 mg, Prednisone 5 mg daily, and  mg BID.     Will continue current outpatient regimen. Continue daily monitoring of tacrolimus levels and dose adjust accordingly.        Sinusitis, chronic    Symptomatically at baseline. Continue with Allegra, Singulair, and Flonase.         Anemia    Received 1 unit of PRBCs 10/2. H/H stable. Reports some improvement in SOB. Will continue to monitor.             Shama Canales PA-C  Lung Transplant  [facility]

## 2018-10-03 NOTE — ASSESSMENT & PLAN NOTE
Received 1 unit of PRBCs 10/2. H/H stable. Reports some improvement in SOB. Will continue to monitor.

## 2018-10-03 NOTE — CONSULTS
"[facility]  Endocrinology  Diabetes Consult Note    Consult Requested by: Francisca Morin DO   Reason for admit: Bronchiolitis obliterans syndrome    HISTORY OF PRESENT ILLNESS:  Reason for Consult: Management of steroid induced hyperglycemia      Surgical Procedure and Date: lung transplant      Diabetes diagnosis year: reports PCP told her she had a "borderline GTT"      Home Diabetes Medications:   None     How often checking glucose at home? none  BG readings on regimen: n/a  Hypoglycemia on the regimen?  denies s/s hypoglycemia       Diabetes Complications include:     none     Complicating diabetes co morbidities:   Glucocorticoid use      HPI:  Patient is a 28 y.o. female with a diagnosis of CF s/p lung transplant, CF pancreatic insufficiency who has been admitted for chest congestion & productive cough over the past 2 weeks.  Currently being treated and managed for possible rejection with high dose steroids x3 days.    Endocrinology consulted to assist with BG management.  Patient notes that she had a "borderline" glucose tolerance test in the past.  She was on low dose toujeo, and recently discontinued as her BGs were "normal."  Patient currently not on any anti-hyperglycemic medication and does not check BG levels.      Interval HPI:   Overnight events:  AFVSS.  NAEO.    Eatin% - outside hospital food.  Nausea: Yes - chronic  Hypoglycemia and intervention: No  Fever: No  TPN and/or TF: No    PMH, PSH, FH, SH updated and reviewed     ROS:  Review of Systems   Constitutional: Negative for unexpected weight change.   HENT: Negative for trouble swallowing.    Eyes: Negative for visual disturbance.   Respiratory: Negative for shortness of breath.    Cardiovascular: Negative for chest pain.   Gastrointestinal: Positive for nausea. Negative for abdominal pain, constipation, diarrhea and vomiting.   Genitourinary: Negative for urgency.   Musculoskeletal: Negative for " arthralgias.   Skin: Negative for wound.   Neurological: Negative for headaches.        Endorses chronic pain   Hematological: Does not bruise/bleed easily.   Psychiatric/Behavioral: Negative for sleep disturbance.       Current Medications and/or Treatments Impacting Glycemic Control  Immunotherapy:    Immunosuppressants         Stop Route Frequency     mycophenolate capsule 500 mg      -- Oral 2 times daily     tacrolimus capsule 0.5 mg      -- Oral 2 times daily     tacrolimus capsule 3 mg      -- Oral 2 times daily        Steroids:   Hormones (From admission, onward)    Start     Stop Route Frequency Ordered    10/03/18 0900  predniSONE tablet 5 mg      -- Oral Daily 10/02/18 1353    10/02/18 1500  methylPREDNISolone sodium succinate (SOLU-MEDROL) 880.5 mg in sodium chloride 0.9% 100 mL IVPB      10/05 0859 IV Daily 10/02/18 1352        Pressors:    Autonomic Drugs (From admission, onward)    None        Hyperglycemia/Diabetes Medications:   Antihyperglycemics (From admission, onward)    Start     Stop Route Frequency Ordered    10/03/18 1045  insulin aspart U-100 pen 1-10 Units      -- SubQ Before meals & nightly PRN 10/03/18 0945             PHYSICAL EXAMINATION:  Vitals:    10/03/18 1204   BP: 122/75   Pulse: 83   Resp: 18   Temp: 98.3 °F (36.8 °C)     Body mass index is 24.45 kg/m².    Physical Exam   Constitutional: She is oriented to person, place, and time. She appears well-developed. No distress.   Non icteric  cushionoid faces   HENT:   Right Ear: External ear normal.   Left Ear: External ear normal.   Nose: Nose normal.   Hearing normal  Dentition good   Neck: No tracheal deviation present. No thyromegaly present.   Cardiovascular: Normal rate.   No murmur heard.  Pulmonary/Chest: Effort normal and breath sounds normal. No respiratory distress.   Abdominal: Soft. Bowel sounds are normal. She exhibits no distension. There is no tenderness. No hernia.   Musculoskeletal: She exhibits no edema.    Neurological: She is alert and oriented to person, place, and time. She has normal reflexes. No cranial nerve deficit.   Skin: No rash noted. She is not diaphoretic.   No nodules  +tattooes   Psychiatric: She has a normal mood and affect. Judgment normal.   Vitals reviewed.        Labs Reviewed and Include   Recent Labs   Lab  10/03/18   0600   GLU  250*   CALCIUM  8.8   ALBUMIN  3.6   PROT  7.0   NA  139   K  4.8   CO2  22*   CL  109   BUN  15   CREATININE  1.3   ALKPHOS  163*   ALT  30   AST  19   BILITOT  0.4     Lab Results   Component Value Date    WBC 6.22 10/03/2018    HGB 9.0 (L) 10/03/2018    HCT 31.0 (L) 10/03/2018    MCV 87 10/03/2018     (L) 10/03/2018     No results for input(s): TSH, FREET4 in the last 168 hours.  Lab Results   Component Value Date    HGBA1C 4.7 05/26/2018       Nutritional status:   Body mass index is 24.45 kg/m².  Lab Results   Component Value Date    ALBUMIN 3.6 10/03/2018    ALBUMIN 3.2 (L) 10/02/2018    ALBUMIN 3.0 (L) 10/01/2018     Lab Results   Component Value Date    PREALBUMIN 15 (L) 05/29/2014    PREALBUMIN 11 (L) 05/09/2014    PREALBUMIN 18 (L) 03/19/2014       Estimated Creatinine Clearance: 52.6 mL/min (based on SCr of 1.3 mg/dL).    Accu-Checks  Recent Labs      10/03/18   1409   POCTGLUCOSE  235*        ASSESSMENT and PLAN    * Bronchiolitis obliterans syndrome    prophylactic treatment for rejection  On high dose steroids x3 days.  Management per primary          Steroid-induced hyperglycemia    No personal history of DM, although history of borderline OGTT  a1c 4.7% without therapy.    BG goal 140-180 while inpatient.    Recommend:  Moderate correction insulin  POC AC/HS    Pending BG - may require transition/IIP.    Discharge:  No need for medication when not on steroids.  Anticipate saleem resolution once steroids completed.        Adverse effect of anabolic steroid    Contributes to significant prandial excursions.          Lung transplant status, bilateral     Treating for possible rejection  Management per primary          Immunosuppression    Can increase insulin resistance              Plan discussed with patient, family, and RN at bedside.     Chey Hill MD  Endocrinology  [facility]

## 2018-10-03 NOTE — PLAN OF CARE
Problem: Patient Care Overview  Goal: Plan of Care Review  Outcome: Ongoing (interventions implemented as appropriate)  Pt is AAOx4 in bed wearing non-skid footwear, bed in low/locked position and with call bell within reach. Pt reminded to use call bell to call for assistance, pt verbalizes understanding. Pt is afebrile at this time. Proper hand hygiene performed before and after pt care activities. IV abx administerd. Denies any pain or discomfort at this time.

## 2018-10-03 NOTE — SUBJECTIVE & OBJECTIVE
Interval HPI:   Overnight events:  AFVSS.  NAEO.    Eatin% - outside hospital food.  Nausea: Yes - chronic  Hypoglycemia and intervention: No  Fever: No  TPN and/or TF: No    PMH, PSH, FH, SH updated and reviewed     ROS:  Review of Systems   Constitutional: Negative for unexpected weight change.   HENT: Negative for trouble swallowing.    Eyes: Negative for visual disturbance.   Respiratory: Negative for shortness of breath.    Cardiovascular: Negative for chest pain.   Gastrointestinal: Positive for nausea. Negative for abdominal pain, constipation, diarrhea and vomiting.   Genitourinary: Negative for urgency.   Musculoskeletal: Negative for arthralgias.   Skin: Negative for wound.   Neurological: Negative for headaches.        Endorses chronic pain   Hematological: Does not bruise/bleed easily.   Psychiatric/Behavioral: Negative for sleep disturbance.       Current Medications and/or Treatments Impacting Glycemic Control  Immunotherapy:    Immunosuppressants         Stop Route Frequency     mycophenolate capsule 500 mg      -- Oral 2 times daily     tacrolimus capsule 0.5 mg      -- Oral 2 times daily     tacrolimus capsule 3 mg      -- Oral 2 times daily        Steroids:   Hormones (From admission, onward)    Start     Stop Route Frequency Ordered    10/03/18 0900  predniSONE tablet 5 mg      -- Oral Daily 10/02/18 1353    10/02/18 1500  methylPREDNISolone sodium succinate (SOLU-MEDROL) 880.5 mg in sodium chloride 0.9% 100 mL IVPB      10/05 0859 IV Daily 10/02/18 1352        Pressors:    Autonomic Drugs (From admission, onward)    None        Hyperglycemia/Diabetes Medications:   Antihyperglycemics (From admission, onward)    Start     Stop Route Frequency Ordered    10/03/18 1045  insulin aspart U-100 pen 1-10 Units      -- SubQ Before meals & nightly PRN 10/03/18 0945             PHYSICAL EXAMINATION:  Vitals:    10/03/18 1204   BP: 122/75   Pulse: 83   Resp: 18   Temp: 98.3 °F (36.8 °C)     Body mass  index is 24.45 kg/m².    Physical Exam   Constitutional: She is oriented to person, place, and time. She appears well-developed. No distress.   Non icteric  cushionoid faces   HENT:   Right Ear: External ear normal.   Left Ear: External ear normal.   Nose: Nose normal.   Hearing normal  Dentition good   Neck: No tracheal deviation present. No thyromegaly present.   Cardiovascular: Normal rate.   No murmur heard.  Pulmonary/Chest: Effort normal and breath sounds normal. No respiratory distress.   Abdominal: Soft. Bowel sounds are normal. She exhibits no distension. There is no tenderness. No hernia.   Musculoskeletal: She exhibits no edema.   Neurological: She is alert and oriented to person, place, and time. She has normal reflexes. No cranial nerve deficit.   Skin: No rash noted. She is not diaphoretic.   No nodules  +tattooes   Psychiatric: She has a normal mood and affect. Judgment normal.   Vitals reviewed.

## 2018-10-03 NOTE — ASSESSMENT & PLAN NOTE
No personal history of DM, although history of borderline OGTT  a1c 4.7% without therapy.    BG goal 140-180 while inpatient.    Recommend:  Moderate correction insulin  POC AC/HS    Pending BG - may require transition/IIP.    Discharge:  No need for medication when not on steroids.  Anticipate saleem resolution once steroids completed.

## 2018-10-03 NOTE — ASSESSMENT & PLAN NOTE
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 Differential given chest imaging findings includes: viral infection (CARVI vs CMV), IFI, rejection, or CARLOS EDUARDO (with the latter most likely given her clinical history). She remains stable on vanc/beatrice while awaiting BAL/biopsy results that are negative for infectious pathogens to date and will start to taper therapy.    - may continue meropenem for now and will taper off tomorrow if unremarkable cx at 48h  - would stop vancomycin today  - await pending fungal markers and will review bronch final results when available

## 2018-10-03 NOTE — PLAN OF CARE
Problem: Patient Care Overview  Goal: Plan of Care Review  Outcome: Ongoing (interventions implemented as appropriate)  VSS. Put bach on sliding insulin scale. Pt is drinking regular cokes and she said that she will not stop.  before lunch. Pt covered w/ 4units of insulin. Blood administered on Tuesday. H&H went from 6.5 / 23.2 to 9.0 / 31.0. Mother in law @ bedside. No issues today. Will continue to monitor pt.

## 2018-10-03 NOTE — PROGRESS NOTES
[facility] [address]  Infectious Disease  Progress Note    Patient Name: Juanita Ibarra  MRN: [id number]  Admission Date: 9/27/2018  Length of Stay: 6 days  Attending Physician: Francisca Morin DO  Primary Care Provider: Jake Alvarez MD    Isolation Status: No active isolations  Assessment/Plan:      Pneumonia    27yo woman w/a history of CF (c/b pancreatic insufficiency, sinus disease, MRSA/Pseudomonas colonization c/b numerous antibiotics allergies, and subsequent COPD/ESLD; s/p BOLT 6/12/2014, CMV D+/R-, simulect induction, on maintenance tacro/MMF/pred; c/b multiple episodes of MRSA/Pseudomonas pneumonia/sinusitis, 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 Differential given chest imaging findings includes: viral infection (CARVI vs CMV), IFI, rejection, or CARLOS EDUARDO (with the latter most likely given her clinical history). She remains stable on vanc/beatrice while awaiting BAL/biopsy results that are negative for infectious pathogens to date and will start to taper therapy.    - may continue meropenem for now and will taper off tomorrow if unremarkable cx at 48h  - would stop vancomycin today  - await pending fungal markers and will review bronch final results when available            Anticipated Disposition: pending improvement    Thank you for your consult. I will follow-up with patient. Please contact us if you have any additional questions.     Juanita Francis MD  Transplant ID Attending  [id number]    Juanita Francis MD  Infectious Disease  [facility]    Subjective:     Principal Problem:Bronchiolitis obliterans syndrome    HPI:     Interval History: Breathing continues to improve after empiric steroids while awaiting biopsy results. Cultures negative so far.    Review of Systems   Constitutional: Positive for fatigue. Negative for appetite change, chills, diaphoresis and fever.   HENT: Positive for congestion (chronic, stable). Negative for postnasal drip, sore throat and trouble swallowing.    Respiratory: Positive for cough and shortness of breath.    Cardiovascular: Negative for chest pain, palpitations and leg swelling.   Gastrointestinal: Negative for abdominal distention, abdominal pain, constipation, diarrhea, nausea and vomiting.   Endocrine: Negative for polydipsia, polyphagia and polyuria.   Genitourinary: Negative for dysuria, frequency and urgency.   Musculoskeletal: Negative for back pain, myalgias, neck pain and neck stiffness.   Skin: Negative for color change, pallor and rash.   Neurological: Negative for dizziness, light-headedness and headaches (chronic, well controlled on current regimen).   Psychiatric/Behavioral: Negative for agitation, behavioral problems and confusion. The patient is not nervous/anxious.      Objective:     Vital Signs (Most Recent):  Temp: 98.1 °F (36.7 °C) (10/03/18 1645)  Pulse: 98 (10/03/18 1645)  Resp: 17 (10/03/18 1645)  BP: (!) 109/59 (10/03/18 1645)  SpO2: (!) 93 % (10/03/18 1645) Vital Signs (24h Range):  Temp:  [98 °F (36.7 °C)-98.6 °F (37 °C)] 98.1 °F (36.7 °C)  Pulse:  [68-98] 98  Resp:  [14-18] 17  SpO2:  [92 %-94 %] 93 %  BP: (109-138)/(59-90) 109/59     Weight: 58.7 kg (129 lb 6.6 oz)  Body mass index is 24.45 kg/m².    Estimated Creatinine Clearance: 52.6 mL/min (based on SCr of 1.3 mg/dL).    Physical Exam   Constitutional: She is oriented to person, place, and time. She appears well-developed and well-nourished. She has a sickly appearance. No distress.   HENT:   Head: Normocephalic and  atraumatic.   Right Ear: External ear normal.   Left Ear: External ear normal.   Nose: Nose normal.   Mouth/Throat: Oropharynx is clear and moist. No oropharyngeal exudate.   Eyes: Conjunctivae and EOM are normal.   Neck: Normal range of motion. Neck supple. No JVD present.   Cardiovascular: Normal rate, regular rhythm, normal heart sounds and intact distal pulses. Exam reveals no gallop and no friction rub.   No murmur heard.  Pulmonary/Chest: Effort normal. She has decreased breath sounds in the right middle field, the right lower field, the left middle field and the left lower field. She has no wheezes. She has no rhonchi. She has no rales.   Abdominal: Soft. Bowel sounds are normal. She exhibits no distension. There is no tenderness. There is no guarding.   Musculoskeletal: Normal range of motion. She exhibits no edema.   Lymphadenopathy:     She has no cervical adenopathy.   Neurological: She is alert and oriented to person, place, and time. No cranial nerve deficit.   Skin: Skin is warm and dry. Capillary refill takes less than 2 seconds. No rash noted. She is not diaphoretic. No erythema. There is pallor.   Psychiatric: Her speech is normal and behavior is normal. Thought content normal. Her mood appears anxious. She exhibits a depressed mood.   Nursing note and vitals reviewed.      Significant Labs:   CBC:   Recent Labs   Lab  10/02/18   0600  10/03/18   0600   WBC  8.86  6.22   HGB  6.5*  9.0*   HCT  23.2*  31.0*   PLT  123*  123*     CMP:   Recent Labs   Lab  10/02/18   0600  10/03/18   0600   NA  141  139   K  4.5  4.8   CL  112*  109   CO2  27  22*   GLU  72  250*   BUN  16  15   CREATININE  1.1  1.3   CALCIUM  8.0*  8.8   PROT  5.9*  7.0   ALBUMIN  3.2*  3.6   BILITOT  0.3  0.4   ALKPHOS  148*  163*   AST  34  19   ALT  34  30   ANIONGAP  2*  8   EGFRNONAA  >60.0  55.6*       Significant Imaging: I have reviewed all pertinent imaging results/findings within the past 24 hours.

## 2018-10-03 NOTE — ASSESSMENT & PLAN NOTE
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 History of MRSA and pseudomonas with recent history of prolonged antibiotic therapy.    Will continue current immunosuppression and prophylaxis. Concerns for possible progression of CARLOS EDUARDO vs infectious etiology. Patient last received empiric half pulse steroids in May 2018. Bronchoscopy performed 10/1, BAL with NGTD. Started on pulse steroids for suspected rejection based on cell count. Today is day 2/3 of pulse steroids. Will likely d/c home on Friday.

## 2018-10-03 NOTE — ASSESSMENT & PLAN NOTE
Concern for possible infectious etiology versus progression of CARLOS EDUARDO. Patient last received half pulse steroids x 4 doses in May 2018.   CT Chest 9/25 from [place] reviewed by Dr. Mae. Patient has recently received 2 weeks of IV Vancomycin and Meropenem at [place].     CT chest from [place] with new multi-focal opacities at the bilateral lung bases. Concerns for progression of CARLOS EDUARDO vs. Infection. Will continue empiric IV Vancomycin and Meropenem while awaiting finalized results from BAL from 10/1 bronchoscopy. Will likely d/c antibiotics if no growth in 48 hours per ID recs. Placed on empiric pulse steroid therapy for suspected rejection (day 2/3) based on cell count from BAL. Maintain O2 sats > 88%. Will determine if patient qualifies for home oxygen prior to anticipated discharge on 10/5.

## 2018-10-03 NOTE — SUBJECTIVE & OBJECTIVE
Interval History: Breathing continues to improve after empiric steroids while awaiting biopsy results. Cultures negative so far.    Review of Systems   Constitutional: Positive for fatigue. Negative for appetite change, chills, diaphoresis and fever.   HENT: Positive for congestion (chronic, stable). Negative for postnasal drip, sore throat and trouble swallowing.    Respiratory: Positive for cough and shortness of breath.    Cardiovascular: Negative for chest pain, palpitations and leg swelling.   Gastrointestinal: Negative for abdominal distention, abdominal pain, constipation, diarrhea, nausea and vomiting.   Endocrine: Negative for polydipsia, polyphagia and polyuria.   Genitourinary: Negative for dysuria, frequency and urgency.   Musculoskeletal: Negative for back pain, myalgias, neck pain and neck stiffness.   Skin: Negative for color change, pallor and rash.   Neurological: Negative for dizziness, light-headedness and headaches (chronic, well controlled on current regimen).   Psychiatric/Behavioral: Negative for agitation, behavioral problems and confusion. The patient is not nervous/anxious.      Objective:     Vital Signs (Most Recent):  Temp: 98.1 °F (36.7 °C) (10/03/18 1645)  Pulse: 98 (10/03/18 1645)  Resp: 17 (10/03/18 1645)  BP: (!) 109/59 (10/03/18 1645)  SpO2: (!) 93 % (10/03/18 1645) Vital Signs (24h Range):  Temp:  [98 °F (36.7 °C)-98.6 °F (37 °C)] 98.1 °F (36.7 °C)  Pulse:  [68-98] 98  Resp:  [14-18] 17  SpO2:  [92 %-94 %] 93 %  BP: (109-138)/(59-90) 109/59     Weight: 58.7 kg (129 lb 6.6 oz)  Body mass index is 24.45 kg/m².    Estimated Creatinine Clearance: 52.6 mL/min (based on SCr of 1.3 mg/dL).    Physical Exam   Constitutional: She is oriented to person, place, and time. She appears well-developed and well-nourished. She has a sickly appearance. No distress.   HENT:   Head: Normocephalic and atraumatic.   Right Ear: External ear normal.   Left Ear: External ear normal.   Nose: Nose normal.    Mouth/Throat: Oropharynx is clear and moist. No oropharyngeal exudate.   Eyes: Conjunctivae and EOM are normal.   Neck: Normal range of motion. Neck supple. No JVD present.   Cardiovascular: Normal rate, regular rhythm, normal heart sounds and intact distal pulses. Exam reveals no gallop and no friction rub.   No murmur heard.  Pulmonary/Chest: Effort normal. She has decreased breath sounds in the right middle field, the right lower field, the left middle field and the left lower field. She has no wheezes. She has no rhonchi. She has no rales.   Abdominal: Soft. Bowel sounds are normal. She exhibits no distension. There is no tenderness. There is no guarding.   Musculoskeletal: Normal range of motion. She exhibits no edema.   Lymphadenopathy:     She has no cervical adenopathy.   Neurological: She is alert and oriented to person, place, and time. No cranial nerve deficit.   Skin: Skin is warm and dry. Capillary refill takes less than 2 seconds. No rash noted. She is not diaphoretic. No erythema. There is pallor.   Psychiatric: Her speech is normal and behavior is normal. Thought content normal. Her mood appears anxious. She exhibits a depressed mood.   Nursing note and vitals reviewed.      Significant Labs:   CBC:   Recent Labs   Lab  10/02/18   0600  10/03/18   0600   WBC  8.86  6.22   HGB  6.5*  9.0*   HCT  23.2*  31.0*   PLT  123*  123*     CMP:   Recent Labs   Lab  10/02/18   0600  10/03/18   0600   NA  141  139   K  4.5  4.8   CL  112*  109   CO2  27  22*   GLU  72  250*   BUN  16  15   CREATININE  1.1  1.3   CALCIUM  8.0*  8.8   PROT  5.9*  7.0   ALBUMIN  3.2*  3.6   BILITOT  0.3  0.4   ALKPHOS  148*  163*   AST  34  19   ALT  34  30   ANIONGAP  2*  8   EGFRNONAA  >60.0  55.6*       Significant Imaging: I have reviewed all pertinent imaging results/findings within the past 24 hours.

## 2018-10-03 NOTE — HPI
"Reason for Consult: Management of steroid induced hyperglycemia      Surgical Procedure and Date: lung transplant 2014     Diabetes diagnosis year: reports PCP told her she had a "borderline GTT"      Home Diabetes Medications:   None     How often checking glucose at home? none  BG readings on regimen: n/a  Hypoglycemia on the regimen?  denies s/s hypoglycemia       Diabetes Complications include:     none     Complicating diabetes co morbidities:   Glucocorticoid use      HPI:  Patient is a 28 y.o. female with a diagnosis of CF s/p lung transplant, CF pancreatic insufficiency who has been admitted for chest congestion & productive cough over the past 2 weeks.  Currently being treated and managed for possible rejection with high dose steroids x3 days.    Endocrinology consulted to assist with BG management.  Patient notes that she had a "borderline" glucose tolerance test in the past.  She was on low dose toujeo, and recently discontinued as her BGs were "normal."  Patient currently not on any anti-hyperglycemic medication and does not check BG levels.    "

## 2018-10-04 NOTE — PHYSICIAN QUERY
PT Name: Juanita Ibarra  MR #: [id number]     Physician Query Form - Documentation Clarification      CDS/: Krupa Fish               Contact information: [email or web address]     This form is a permanent document in the medical record.     Query Date: October 4, 2018    By submitting this query, we are merely seeking further clarification of documentation. Please utilize your independent clinical judgment when addressing the question(s) below.    The Medical record reflects the following:    Supporting Clinical Findings Location in Medical Record   CT chest from [place] with new multi-focal opacities at the bilateral lung bases. Concerns for progression of CARLOS EDUARDO. BAL from 10/1 bronchoscopy with NGTD. Vancomycin discontinued 10/3, Meropenem discontinued today. Placed on empiric pulse steroid therapy for suspected rejection (day 3/3) based on cell count from BAL. Finalized path with no evidence of acute rejection. Maintain O2 sats > 88%. Patient qualified for home oxygen supplementation today.     Concerns for possible progression of underlying CARLOS EDUARDO given her lack of improvement with antibiotic therapy    Patient qualified for home oxygen supplementation today.    Transplant PN 10/4                Transplant PN 10/4      Transplant PN 10/4                                                                                Doctor, Please specify diagnosis or diagnoses associated with above clinical findings.    Provider Use Only      (x) CARLOS EDUARDO complicating the function of transplanted lung    (  ) CARLOS EDUARDO not complicating the function of transplanted lung    (  ) Other____________________                                                                                                         [  ] Clinically undetermined

## 2018-10-04 NOTE — SUBJECTIVE & OBJECTIVE
Subjective:     Interval History: No acute events overnight. Continues to have dry cough and shortness of breath. No other complaints today.     Continuous Infusions:  Scheduled Meds:   amLODIPine  10 mg Oral Daily    ARIPiprazole  2 mg Oral Daily    calcium-vitamin D3  1 tablet Oral BID WM    dapsone  100 mg Oral Daily    enoxaparin  40 mg Subcutaneous Daily    fexofenadine  180 mg Oral Daily    fluticasone  2 spray Each Nare Daily    gabapentin  1,100 mg Oral TID    levalbuterol  0.63 mg Nebulization Q8H WA    lipase-protease-amylase  4 capsule Oral TID WM    lisinopril  10 mg Oral Daily    magnesium oxide  400 mg Oral BID    montelukast  10 mg Oral Daily    morphine  15 mg Oral TID    multivit-min-FA-coenzyme Q10 100-5 mcg-mg  1 tablet Oral BID    mycophenolate  500 mg Oral BID    predniSONE  5 mg Oral Daily    tacrolimus  0.5 mg Oral BID    tacrolimus  3 mg Oral BID    topiramate  50 mg Oral BID    venlafaxine  75 mg Oral QHS     PRN Meds:sodium chloride, acetaminophen, butalbital-acetaminophen-caffeine -40 mg, dextrose 50%, dextrose 50%, diphenhydrAMINE, glucagon (human recombinant), glucose, glucose, guaiFENesin, heparin, porcine (PF), insulin aspart U-100, ipratropium, LORazepam, ondansetron, oxyCODONE, polyethylene glycol, potassium chloride 10% **AND** potassium chloride 10% **AND** potassium chloride 10%, promethazine, tiZANidine    Review of patient's allergies indicates:   Allergen Reactions    Albuterol Palpitations    Colistin Anaphylaxis    Vancomycin analogues      Infusion reaction that does not resolve with slowing    Neupogen [filgrastim] Other (See Comments)     Ostealgia after five daily doses of 300 mcg.      Bactrim [sulfamethoxazole-trimethoprim] Hives    Ceftazidime Hives     Pt stated can tolerate cefapine not ceftazidime    Ceftazidime     Dronabinol Other (See Comments)     Mental changes/hallucinations    Haldol [haloperidol lactate] Other (See  Comments)     Seizure like activity    Nsaids (non-steroidal anti-inflammatory drug)      Cannot have due to lung transplant    Adhesive Rash     Cloth tape- please use tegaderm or paper tape    Aztreonam Rash    Ciprofloxacin Nausea And Vomiting     Projectile N/V, per patient.  Unwilling to retry therapy.       Review of Systems   Constitutional: Positive for fatigue. Negative for appetite change, chills, diaphoresis and fever.   HENT: Positive for congestion (chronic, stable). Negative for postnasal drip, sore throat and trouble swallowing.    Respiratory: Positive for cough and shortness of breath. Negative for chest tightness, wheezing and stridor.    Cardiovascular: Negative for chest pain, palpitations and leg swelling.   Gastrointestinal: Negative for abdominal distention, abdominal pain, constipation, diarrhea, nausea and vomiting.   Endocrine: Negative for polydipsia, polyphagia and polyuria.   Genitourinary: Negative for dysuria, frequency, hematuria and urgency.   Musculoskeletal: Positive for myalgias. Negative for back pain, neck pain and neck stiffness.   Skin: Negative for color change, pallor and rash.   Allergic/Immunologic: Positive for immunocompromised state.   Neurological: Negative for dizziness, weakness, light-headedness and headaches (chronic, well controlled on current regimen).   Psychiatric/Behavioral: Negative for agitation, behavioral problems and confusion. The patient is not nervous/anxious.      Objective:   Physical Exam   Constitutional: She is oriented to person, place, and time. She appears well-developed and well-nourished. No distress.   HENT:   Head: Normocephalic and atraumatic.   Nose: Nose normal.   Eyes: Conjunctivae and EOM are normal.   Neck: Normal range of motion. Neck supple. No JVD present.   Cardiovascular: Normal rate, regular rhythm, normal heart sounds and intact distal pulses. Exam reveals no gallop and no friction rub.   No murmur heard.  Pulmonary/Chest:  Effort normal. No stridor. No respiratory distress. She has no decreased breath sounds. She has no wheezes. She has no rhonchi. She has no rales.   Abdominal: Soft. Bowel sounds are normal. She exhibits no distension. There is no tenderness. There is no guarding.   Musculoskeletal: Normal range of motion. She exhibits no edema.   Lymphadenopathy:     She has no cervical adenopathy.   Neurological: She is alert and oriented to person, place, and time. No cranial nerve deficit.   Skin: Skin is warm and dry. Capillary refill takes less than 2 seconds. No rash noted. She is not diaphoretic. No erythema. There is pallor.   Psychiatric: Her speech is normal and behavior is normal. Thought content normal. Her mood appears not anxious. She does not exhibit a depressed mood.   Nursing note and vitals reviewed.        Vital Signs (Most Recent):  Temp: 98.2 °F (36.8 °C) (10/04/18 0805)  Pulse: 76 (10/04/18 0805)  Resp: 16 (10/04/18 0805)  BP: 125/85 (10/04/18 0805)  SpO2: (!) 93 % (10/04/18 0805) Vital Signs (24h Range):  Temp:  [98 °F (36.7 °C)-98.3 °F (36.8 °C)] 98.2 °F (36.8 °C)  Pulse:  [] 76  Resp:  [16-20] 16  SpO2:  [92 %-95 %] 93 %  BP: (109-131)/(59-89) 125/85     Weight: 58.7 kg (129 lb 6.6 oz)  Body mass index is 24.45 kg/m².      Intake/Output Summary (Last 24 hours) at 10/4/2018 1034  Last data filed at 10/4/2018 0805  Gross per 24 hour   Intake 440 ml   Output --   Net 440 ml       Significant Labs:  CBC:  Recent Labs   Lab  10/04/18   0600   WBC  11.51   RBC  3.44*   HGB  8.7*   HCT  29.3*   PLT  145*   MCV  85   MCH  25.3*   MCHC  29.7*     BMP:  Recent Labs   Lab  10/04/18   0600   NA  141   K  4.3   CL  109   CO2  22*   BUN  23*   CREATININE  1.2   CALCIUM  8.6*      Tacrolimus Levels:  Recent Labs   Lab  10/04/18   0600   TACROLIMUS  6.6     Microbiology:  Microbiology Results (last 7 days)     Procedure Component Value Units Date/Time    Fungus culture [[id number]] Collected:  10/01/18 1319    Order  Status:  Completed Specimen:  Respiratory from [facility] Updated:  10/03/18 1413     Fungus (Mycology) Culture --     YEAST   Few  Identification pending      Narrative:       Bronchial Wash    Fungus culture [[id number]] Collected:  10/01/18 1319    Order Status:  Completed Specimen:  Respiratory from [facility] Updated:  10/03/18 1409     Fungus (Mycology) Culture --     YEAST   Many  Identification pending      Narrative:       Bronchial Wash    Culture, Respiratory [[id number]] Collected:  10/01/18 1319    Order Status:  Completed Specimen:  Respiratory from [facility] Updated:  10/03/18 0748     Respiratory Culture Normal respiratory anibal     Gram Stain (Respiratory) Rare WBC's     Gram Stain (Respiratory) No organisms seen    Narrative:       Bronchial Wash    Culture, Respiratory [[id number]] Collected:  10/01/18 1319    Order Status:  Completed Specimen:  Respiratory from [facility] Updated:  10/03/18 0741     Respiratory Culture Normal respiratory anibal     Gram Stain (Respiratory) Rare WBC's     Gram Stain (Respiratory) No organisms seen    Narrative:       Bronchial Wash    AFB Culture & Smear [[id number]] Collected:  10/01/18 1319    Order Status:  Completed Specimen:  Respiratory from [facility] Updated:  10/02/18 2127     AFB Culture & Smear Culture in progress     AFB CULTURE STAIN No acid fast bacilli seen.    Narrative:       Bronchial Wash    AFB Culture & Smear [[id number]] Collected:  10/01/18 1319    Order Status:  Completed Specimen:  Respiratory from [facility] Updated:  10/02/18 2127     AFB Culture & Smear Culture in progress     AFB CULTURE STAIN No acid fast bacilli seen.    Narrative:       Bronchial Wash    Respiratory Viral Panel by PCR [facility]; Nasal Swab [[id number]]     Order Status:  No result Specimen:  Respiratory     Culture, Respiratory with Gram Stain [[id number]]     Order Status:  No result Specimen:  Respiratory from Sputum, Expectorated           Microbiology Results (last 7 days)      Procedure Component Value Units Date/Time    Fungus culture [[id number]] Collected:  10/01/18 1319    Order Status:  Completed Specimen:  Respiratory from [facility] Updated:  10/03/18 1413     Fungus (Mycology) Culture --     YEAST   Few  Identification pending      Narrative:       Bronchial Wash    Fungus culture [[id number]] Collected:  10/01/18 1319    Order Status:  Completed Specimen:  Respiratory from [facility] Updated:  10/03/18 1409     Fungus (Mycology) Culture --     YEAST   Many  Identification pending      Narrative:       Bronchial Wash    Culture, Respiratory [[id number]] Collected:  10/01/18 1319    Order Status:  Completed Specimen:  Respiratory from [facility] Updated:  10/03/18 0748     Respiratory Culture Normal respiratory anibal     Gram Stain (Respiratory) Rare WBC's     Gram Stain (Respiratory) No organisms seen    Narrative:       Bronchial Wash    Culture, Respiratory [[id number]] Collected:  10/01/18 1319    Order Status:  Completed Specimen:  Respiratory from [facility] Updated:  10/03/18 0741     Respiratory Culture Normal respiratory anibal     Gram Stain (Respiratory) Rare WBC's     Gram Stain (Respiratory) No organisms seen    Narrative:       Bronchial Wash    AFB Culture & Smear [[id number]] Collected:  10/01/18 1319    Order Status:  Completed Specimen:  Respiratory from [facility] Updated:  10/02/18 2127     AFB Culture & Smear Culture in progress     AFB CULTURE STAIN No acid fast bacilli seen.    Narrative:       Bronchial Wash    AFB Culture & Smear [[id number]] Collected:  10/01/18 1319    Order Status:  Completed Specimen:  Respiratory from [facility] Updated:  10/02/18 2127     AFB Culture & Smear Culture in progress     AFB CULTURE STAIN No acid fast bacilli seen.    Narrative:       Bronchial Wash    Respiratory Viral Panel by PCR [facility]; Nasal Swab [[id number]]     Order Status:  No result Specimen:  Respiratory     Culture, Respiratory with Gram Stain [[id number]]     Order Status:  No  result Specimen:  Respiratory from Sputum, Expectorated         I have reviewed all pertinent labs within the past 24 hours.

## 2018-10-04 NOTE — ASSESSMENT & PLAN NOTE
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 History of MRSA and pseudomonas with recent history of prolonged antibiotic therapy.    Will continue current immunosuppression and prophylaxis. Concerns for possible progression of underlying CARLOS EDUARDO given her lack of improvement with antibiotic therapy. Patient last received empiric half pulse steroids in May 2018. Bronchoscopy performed 10/1, BAL with NGTD. Started on pulse steroids for suspected rejection based on cell count. Today is day 3/3 of pulse steroids. Will likely d/c home on tomorrow.

## 2018-10-04 NOTE — PROGRESS NOTES
[facility]  Infectious Disease  Progress Note    Patient Name: Juanita Ibarra  MRN: [id number]  Admission Date: 9/27/2018  Length of Stay: 7 days  Attending Physician: Francisca Morin DO  Primary Care Provider: Jake Alvarez MD    Isolation Status: No active isolations  Assessment/Plan:      Pneumonia    27yo woman w/a history of CF (c/b pancreatic insufficiency, sinus disease, MRSA/Pseudomonas colonization c/b numerous antibiotics allergies, and subsequent COPD/ESLD; s/p BOLT 6/12/2014, CMV D+/R-, simulect induction, on maintenance tacro/MMF/pred; c/b multiple episodes of MRSA/Pseudomonas pneumonia/sinusitis, 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 She has improved following bronchoscopy where thick secretions were suctioned out that were culture negative but PCR positive for Pseudomonas -- suspected treated Pseudomonas pneumonia cause of symptoms. No evidence of rejection on biopsy notably. She is improved and eager for discharge.    - would stop meropenem as planned as patient has received several weeks at this point for presumptive Pseudomonas pneumonia   - would continue remainder of prophylaxis per protocol  - I suspect probable Candida growing from BAL reflects Candidal colonization/commensals selected for in absence of antifungal prophylaxis -- biopsy without evidence of acute pneumonia at this time            Anticipated Disposition: per primary team    Thank you for your consult. I will sign off. Please contact us if you have any additional questions.      Juanita Francis MD  Transplant ID Attending  [id number]    Juanita Francis MD  Infectious Disease  [facility]    Subjective:     Principal Problem:Bronchiolitis obliterans syndrome    HPI:     Interval History: Breathing continues to improve after empiric steroid course. Biopsy without evidence of acute inflammation. Bacterial cx negative but PCR positive for Pseudomonas, implying likely a treated Pseudomonas as etiology of symptoms. Yeast in BAL, suspected to be colonizing Candida selected for while patient has been on antibiotics.    Review of Systems   Constitutional: Positive for fatigue. Negative for appetite change, chills, diaphoresis and fever.   HENT: Positive for congestion (chronic, stable). Negative for postnasal drip, sore throat and trouble swallowing.    Respiratory: Positive for cough and shortness of breath.    Cardiovascular: Negative for chest pain, palpitations and leg swelling.   Gastrointestinal: Negative for abdominal distention, abdominal pain, constipation, diarrhea, nausea and vomiting.   Endocrine: Negative for polydipsia, polyphagia and polyuria.   Genitourinary: Negative for dysuria, frequency and urgency.   Musculoskeletal: Negative for back pain, myalgias, neck pain and neck stiffness.   Skin: Negative for color change, pallor and rash.   Neurological: Negative for dizziness, light-headedness and headaches (chronic, well controlled on current regimen).   Psychiatric/Behavioral: Negative for agitation, behavioral problems and confusion. The patient is not nervous/anxious.      Objective:     Vital Signs (Most Recent):  Temp: 98.9 °F (37.2 °C) (10/04/18 1539)  Pulse: 76 (10/04/18 0805)  Resp: 16 (10/04/18 0805)  BP: 125/85 (10/04/18 0805)  SpO2: (!) 93 % (10/04/18 0805) Vital Signs (24h Range):  Temp:  [98 °F (36.7 °C)-98.9 °F (37.2 °C)] 98.9 °F (37.2 °C)  Pulse:  [] 76  Resp:  [16-20] 16  SpO2:  [93 %-95 %] 93 %  BP: (109-131)/(59-89) 125/85     Weight:  58.7 kg (129 lb 6.6 oz)  Body mass index is 24.45 kg/m².    Estimated Creatinine Clearance: 57 mL/min (based on SCr of 1.2 mg/dL).    Physical Exam   Constitutional: She is oriented to person, place, and time. She appears well-developed and well-nourished. She has a sickly appearance. No distress.   HENT:   Head: Normocephalic and atraumatic.   Right Ear: External ear normal.   Left Ear: External ear normal.   Nose: Nose normal.   Mouth/Throat: Oropharynx is clear and moist. No oropharyngeal exudate.   Eyes: Conjunctivae and EOM are normal.   Neck: Normal range of motion. Neck supple. No JVD present.   Cardiovascular: Normal rate, regular rhythm, normal heart sounds and intact distal pulses. Exam reveals no gallop and no friction rub.   No murmur heard.  Pulmonary/Chest: Effort normal. She has decreased breath sounds in the right middle field, the right lower field, the left middle field and the left lower field. She has no wheezes. She has no rhonchi. She has no rales.   Abdominal: Soft. Bowel sounds are normal. She exhibits no distension. There is no tenderness. There is no guarding.   Musculoskeletal: Normal range of motion. She exhibits no edema.   Lymphadenopathy:     She has no cervical adenopathy.   Neurological: She is alert and oriented to person, place, and time. No cranial nerve deficit.   Skin: Skin is warm and dry. Capillary refill takes less than 2 seconds. No rash noted. She is not diaphoretic. No erythema. There is pallor.   Psychiatric: Her speech is normal and behavior is normal. Thought content normal. Her mood appears anxious. She exhibits a depressed mood.   Nursing note and vitals reviewed.      Significant Labs:   CBC:   Recent Labs   Lab  10/03/18   0600  10/04/18   0600   WBC  6.22  11.51   HGB  9.0*  8.7*   HCT  31.0*  29.3*   PLT  123*  145*     CMP:   Recent Labs   Lab  10/03/18   0600  10/04/18   0600   NA  139  141   K  4.8  4.3   CL  109  109   CO2  22*  22*   GLU  250*  191*   BUN  15   23*   CREATININE  1.3  1.2   CALCIUM  8.8  8.6*   PROT  7.0  6.6   ALBUMIN  3.6  3.4*   BILITOT  0.4  0.4   ALKPHOS  163*  141*   AST  19  9*   ALT  30  19   ANIONGAP  8  10   EGFRNONAA  55.6*  >60.0       Significant Imaging: I have reviewed all pertinent imaging results/findings within the past 24 hours.

## 2018-10-04 NOTE — ASSESSMENT & PLAN NOTE
Concern for possible infectious etiology versus progression of CARLOS EDUARDO. Patient last received half pulse steroids x 4 doses in May 2018.   CT Chest 9/25 from [facility] reviewed by Dr. Mae. Patient has recently received 2 weeks of IV Vancomycin and Meropenem at [facility].     CT chest from [facility] with new multi-focal opacities at the bilateral lung bases. Concerns for progression of CARLOS EDUARDO. BAL from 10/1 bronchoscopy with NGTD. Vancomycin discontinued 10/3, Meropenem discontinued today. Placed on empiric pulse steroid therapy for suspected rejection (day 3/3) based on cell count from BAL. Finalized path with no evidence of acute rejection. Maintain O2 sats > 88%. Patient qualified for home oxygen supplementation today.

## 2018-10-04 NOTE — PROGRESS NOTES
[facility]  Lung Transplant  Progress Note - Floor    Patient Name: Juanita Ibarra  MRN: [id number]  Admission Date: 9/27/2018  Hospital Length of Stay: 7 days  Post-Operative Day: 1575  Attending Physician: Francisca Morin DO  Primary Care Provider: Jake Alvarez MD     Subjective:     Interval History: No acute events overnight. Continues to have dry cough and shortness of breath. No other complaints today.     Continuous Infusions:  Scheduled Meds:   amLODIPine  10 mg Oral Daily    ARIPiprazole  2 mg Oral Daily    calcium-vitamin D3  1 tablet Oral BID WM    dapsone  100 mg Oral Daily    enoxaparin  40 mg Subcutaneous Daily    fexofenadine  180 mg Oral Daily    fluticasone  2 spray Each Nare Daily    gabapentin  1,100 mg Oral TID    levalbuterol  0.63 mg Nebulization Q8H WA    lipase-protease-amylase  4 capsule Oral TID WM    lisinopril  10 mg Oral Daily    magnesium oxide  400 mg Oral BID    montelukast  10 mg Oral Daily    morphine  15 mg Oral TID    multivit-min-FA-coenzyme Q10 100-5 mcg-mg  1 tablet Oral BID    mycophenolate  500 mg Oral BID    predniSONE  5 mg Oral Daily    tacrolimus  0.5 mg Oral BID    tacrolimus  3 mg Oral BID    topiramate  50 mg Oral BID    venlafaxine  75 mg Oral QHS     PRN Meds:sodium chloride, acetaminophen, butalbital-acetaminophen-caffeine -40 mg, dextrose 50%, dextrose 50%, diphenhydrAMINE, glucagon (human recombinant), glucose, glucose, guaiFENesin, heparin, porcine (PF), insulin aspart U-100, ipratropium, LORazepam, ondansetron, oxyCODONE, polyethylene glycol, potassium chloride 10% **AND** potassium chloride 10% **AND** potassium chloride 10%, promethazine, tiZANidine    Review of patient's allergies indicates:   Allergen Reactions    Albuterol Palpitations    Colistin Anaphylaxis    Vancomycin analogues      Infusion reaction that does not resolve with slowing    Neupogen [filgrastim] Other (See Comments)      Ostealgia after five daily doses of 300 mcg.      Bactrim [sulfamethoxazole-trimethoprim] Hives    Ceftazidime Hives     Pt stated can tolerate cefapine not ceftazidime    Ceftazidime     Dronabinol Other (See Comments)     Mental changes/hallucinations    Haldol [haloperidol lactate] Other (See Comments)     Seizure like activity    Nsaids (non-steroidal anti-inflammatory drug)      Cannot have due to lung transplant    Adhesive Rash     Cloth tape- please use tegaderm or paper tape    Aztreonam Rash    Ciprofloxacin Nausea And Vomiting     Projectile N/V, per patient.  Unwilling to retry therapy.       Review of Systems   Constitutional: Positive for fatigue. Negative for appetite change, chills, diaphoresis and fever.   HENT: Positive for congestion (chronic, stable). Negative for postnasal drip, sore throat and trouble swallowing.    Respiratory: Positive for cough and shortness of breath. Negative for chest tightness, wheezing and stridor.    Cardiovascular: Negative for chest pain, palpitations and leg swelling.   Gastrointestinal: Negative for abdominal distention, abdominal pain, constipation, diarrhea, nausea and vomiting.   Endocrine: Negative for polydipsia, polyphagia and polyuria.   Genitourinary: Negative for dysuria, frequency, hematuria and urgency.   Musculoskeletal: Positive for myalgias. Negative for back pain, neck pain and neck stiffness.   Skin: Negative for color change, pallor and rash.   Allergic/Immunologic: Positive for immunocompromised state.   Neurological: Negative for dizziness, weakness, light-headedness and headaches (chronic, well controlled on current regimen).   Psychiatric/Behavioral: Negative for agitation, behavioral problems and confusion. The patient is not nervous/anxious.      Objective:   Physical Exam   Constitutional: She is oriented to person, place, and time. She appears well-developed and well-nourished. No distress.   HENT:   Head: Normocephalic and  atraumatic.   Nose: Nose normal.   Eyes: Conjunctivae and EOM are normal.   Neck: Normal range of motion. Neck supple. No JVD present.   Cardiovascular: Normal rate, regular rhythm, normal heart sounds and intact distal pulses. Exam reveals no gallop and no friction rub.   No murmur heard.  Pulmonary/Chest: Effort normal. No stridor. No respiratory distress. She has no decreased breath sounds. She has no wheezes. She has no rhonchi. She has no rales.   Abdominal: Soft. Bowel sounds are normal. She exhibits no distension. There is no tenderness. There is no guarding.   Musculoskeletal: Normal range of motion. She exhibits no edema.   Lymphadenopathy:     She has no cervical adenopathy.   Neurological: She is alert and oriented to person, place, and time. No cranial nerve deficit.   Skin: Skin is warm and dry. Capillary refill takes less than 2 seconds. No rash noted. She is not diaphoretic. No erythema. There is pallor.   Psychiatric: Her speech is normal and behavior is normal. Thought content normal. Her mood appears not anxious. She does not exhibit a depressed mood.   Nursing note and vitals reviewed.        Vital Signs (Most Recent):  Temp: 98.2 °F (36.8 °C) (10/04/18 0805)  Pulse: 76 (10/04/18 0805)  Resp: 16 (10/04/18 0805)  BP: 125/85 (10/04/18 0805)  SpO2: (!) 93 % (10/04/18 0805) Vital Signs (24h Range):  Temp:  [98 °F (36.7 °C)-98.3 °F (36.8 °C)] 98.2 °F (36.8 °C)  Pulse:  [] 76  Resp:  [16-20] 16  SpO2:  [92 %-95 %] 93 %  BP: (109-131)/(59-89) 125/85     Weight: 58.7 kg (129 lb 6.6 oz)  Body mass index is 24.45 kg/m².      Intake/Output Summary (Last 24 hours) at 10/4/2018 1034  Last data filed at 10/4/2018 0805  Gross per 24 hour   Intake 440 ml   Output --   Net 440 ml       Significant Labs:  CBC:  Recent Labs   Lab  10/04/18   0600   WBC  11.51   RBC  3.44*   HGB  8.7*   HCT  29.3*   PLT  145*   MCV  85   MCH  25.3*   MCHC  29.7*     BMP:  Recent Labs   Lab  10/04/18   0600   NA  141   K  4.3    CL  109   CO2  22*   BUN  23*   CREATININE  1.2   CALCIUM  8.6*      Tacrolimus Levels:  Recent Labs   Lab  10/04/18   0600   TACROLIMUS  6.6     Microbiology:  Microbiology Results (last 7 days)     Procedure Component Value Units Date/Time    Fungus culture [[id number]] Collected:  10/01/18 1319    Order Status:  Completed Specimen:  Respiratory from [facility] Updated:  10/03/18 1413     Fungus (Mycology) Culture --     YEAST   Few  Identification pending      Narrative:       Bronchial Wash    Fungus culture [[id number]] Collected:  10/01/18 1319    Order Status:  Completed Specimen:  Respiratory from [facility] Updated:  10/03/18 1409     Fungus (Mycology) Culture --     YEAST   Many  Identification pending      Narrative:       Bronchial Wash    Culture, Respiratory [[id number]] Collected:  10/01/18 1319    Order Status:  Completed Specimen:  Respiratory from [facility] Updated:  10/03/18 0748     Respiratory Culture Normal respiratory anibal     Gram Stain (Respiratory) Rare WBC's     Gram Stain (Respiratory) No organisms seen    Narrative:       Bronchial Wash    Culture, Respiratory [[id number]] Collected:  10/01/18 1319    Order Status:  Completed Specimen:  Respiratory from [facility] Updated:  10/03/18 0741     Respiratory Culture Normal respiratory anibal     Gram Stain (Respiratory) Rare WBC's     Gram Stain (Respiratory) No organisms seen    Narrative:       Bronchial Wash    AFB Culture & Smear [[id number]] Collected:  10/01/18 1319    Order Status:  Completed Specimen:  Respiratory from [facility] Updated:  10/02/18 2127     AFB Culture & Smear Culture in progress     AFB CULTURE STAIN No acid fast bacilli seen.    Narrative:       Bronchial Wash    AFB Culture & Smear [[id number]] Collected:  10/01/18 1319    Order Status:  Completed Specimen:  Respiratory from [facility] Updated:  10/02/18 2127     AFB Culture & Smear Culture in progress     AFB CULTURE STAIN No acid fast bacilli seen.    Narrative:       Bronchial  Wash    Respiratory Viral Panel by PCR [facility]; Nasal Swab [[id number]]     Order Status:  No result Specimen:  Respiratory     Culture, Respiratory with Gram Stain [[id number]]     Order Status:  No result Specimen:  Respiratory from Sputum, Expectorated           Microbiology Results (last 7 days)     Procedure Component Value Units Date/Time    Fungus culture [[id number]] Collected:  10/01/18 1319    Order Status:  Completed Specimen:  Respiratory from [facility] Updated:  10/03/18 1413     Fungus (Mycology) Culture --     YEAST   Few  Identification pending      Narrative:       Bronchial Wash    Fungus culture [[id number]] Collected:  10/01/18 1319    Order Status:  Completed Specimen:  Respiratory from [facility] Updated:  10/03/18 1409     Fungus (Mycology) Culture --     YEAST   Many  Identification pending      Narrative:       Bronchial Wash    Culture, Respiratory [[id number]] Collected:  10/01/18 1319    Order Status:  Completed Specimen:  Respiratory from [facility] Updated:  10/03/18 0748     Respiratory Culture Normal respiratory anibal     Gram Stain (Respiratory) Rare WBC's     Gram Stain (Respiratory) No organisms seen    Narrative:       Bronchial Wash    Culture, Respiratory [[id number]] Collected:  10/01/18 1319    Order Status:  Completed Specimen:  Respiratory from [facility] Updated:  10/03/18 0741     Respiratory Culture Normal respiratory anibal     Gram Stain (Respiratory) Rare WBC's     Gram Stain (Respiratory) No organisms seen    Narrative:       Bronchial Wash    AFB Culture & Smear [[id number]] Collected:  10/01/18 1319    Order Status:  Completed Specimen:  Respiratory from [facility] Updated:  10/02/18 2127     AFB Culture & Smear Culture in progress     AFB CULTURE STAIN No acid fast bacilli seen.    Narrative:       Bronchial Wash    AFB Culture & Smear [[id number]] Collected:  10/01/18 1319    Order Status:  Completed Specimen:  Respiratory from [facility] Updated:  10/02/18 2127     AFB Culture &  Smear Culture in progress     AFB CULTURE STAIN No acid fast bacilli seen.    Narrative:       Bronchial Wash    Respiratory Viral Panel by PCR [facility]; Nasal Swab [[id number]]     Order Status:  No result Specimen:  Respiratory     Culture, Respiratory with Gram Stain [[id number]]     Order Status:  No result Specimen:  Respiratory from Sputum, Expectorated         I have reviewed all pertinent labs within the past 24 hours.        Assessment/Plan:     * Bronchiolitis obliterans syndrome    Concern for possible infectious etiology versus progression of CARLOS EDUARDO. Patient last received half pulse steroids x 4 doses in May 2018.   CT Chest 9/25 from [facility] reviewed by Dr. Mae. Patient has recently received 2 weeks of IV Vancomycin and Meropenem at [facility].     CT chest from [facility] with new multi-focal opacities at the bilateral lung bases. Concerns for progression of CARLOS EDUARDO. BAL from 10/1 bronchoscopy with NGTD. Vancomycin discontinued 10/3, Meropenem discontinued today. Placed on empiric pulse steroid therapy for suspected rejection (day 3/3) based on cell count from BAL. Finalized path with no evidence of acute rejection. Maintain O2 sats > 88%. Patient qualified for home oxygen supplementation today.         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 History of MRSA and pseudomonas with recent history of prolonged antibiotic therapy.    Will continue current immunosuppression and prophylaxis. Concerns for possible progression of underlying CARLOS EDUARDO given her lack of improvement with antibiotic therapy. Patient last received empiric half pulse steroids in May 2018. Bronchoscopy performed 10/1, BAL with NGTD. Started on pulse steroids for suspected rejection based on cell count. Today is day 3/3 of pulse steroids. Will likely d/c home on  tomorrow.         Prophylactic antibiotic    Continue Dapsone 100 mg daily.         Immunosuppression    Current outpatient regimen: Tacrolimus 3.5 mg, Prednisone 5 mg daily, and  mg BID.     Will continue current outpatient regimen. Continue daily monitoring of tacrolimus levels and dose adjust accordingly.        Sinusitis, chronic    Symptomatically at baseline. Continue with Allegra, Singulair, and Flonase.         Anemia    Received 1 unit of PRBCs 10/2. H/H stable. Reports some improvement in SOB. Will continue to monitor.             Shama Canales PA-C  Lung Transplant  [facility]

## 2018-10-04 NOTE — PROGRESS NOTES
Pt qualified for O2 at home. [facility] (ph# [id number]). All DME orders automatically sent through [facility] clearing house and are outsourced to alternate DME companies if needed due to location or insurance. LOKI followed up with [facility] and spoke with Callie who states sent all documents to Sienna Dwyer (ph# [phone]) as pt will be d/c to s/o home [address] [id number].    LOKI rcvd return call from [facility] with Sienna Dwyer who states rcvd order and states that pt's insurance no longer covers portable tanks outside of hospital discharge. Cost for portable other than return tanks home for dc will be $325 for two.  However portable will be set up and covered. Sienna to inform pt of benefits. LOKI to follow up with pt. LOKI remains available.

## 2018-10-04 NOTE — ASSESSMENT & PLAN NOTE
29yo woman w/a history of CF (c/b pancreatic insufficiency, sinus disease, MRSA/Pseudomonas colonization c/b numerous antibiotics allergies, and subsequent COPD/ESLD; s/p BOLT 6/12/2014, CMV D+/R-, simulect induction, on maintenance tacro/MMF/pred; c/b multiple episodes of MRSA/Pseudomonas pneumonia/sinusitis, 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 She has improved following bronchoscopy where thick secretions were suctioned out that were culture negative but PCR positive for Pseudomonas -- suspected treated Pseudomonas pneumonia cause of symptoms. No evidence of rejection on biopsy notably. She is improved and eager for discharge.    - would stop meropenem as planned as patient has received several weeks at this point for presumptive Pseudomonas pneumonia   - would continue remainder of prophylaxis per protocol  - I suspect probable Candida growing from BAL reflects Candidal colonization/commensals selected for in absence of antifungal prophylaxis -- biopsy without evidence of acute pneumonia at this time

## 2018-10-04 NOTE — PROGRESS NOTES
Home Oxygen Evaluation    Date Performed: 10/4/2018    1) Patient's O2 Sat on room air, while at rest: 95%        If O2 sats on room air at rest are 88% or below, patient qualifies. No additional testing needed. Document N/A in steps 2 and 3. If 89% or above, complete steps 2.      2) Patient's O2 Sat on room air while exercisin%        If O2 sats on room air while exercising remain 89% or above patient does not qualify, no further testing needed Document N/A in step 3. If O2 sats on room air while exercising are 88% or below, continue to step 3.      3) Patient's O2 Sat while exercising on O2: 96% at 2 LPM         (Must show improvement from #2 for patients to qualify)    If O2 sats improve on oxygen, patient qualifies for portable oxygen. If not, the patient does not qualify.

## 2018-10-04 NOTE — PLAN OF CARE
Reviewed BG levels and insulin regimen.     Goal BG while inpatient: 140-180 mg/dl  Current BG levels are slightly above goal    Completed high dose IV steroids today.   Currently on Prednisone 5 mg, daily.    Recommendations:   Moderate dose correction  POC AC/HS    If BG levels remain elevated, will consider adding low dose of prandial insulin.       Discharge:  No need for medication when not on steroids.  Anticipate saleem resolution once steroids completed.

## 2018-10-04 NOTE — SUBJECTIVE & OBJECTIVE
Interval History: Breathing continues to improve after empiric steroid course. Biopsy without evidence of acute inflammation. Bacterial cx negative but PCR positive for Pseudomonas, implying likely a treated Pseudomonas as etiology of symptoms. Yeast in BAL, suspected to be colonizing Candida selected for while patient has been on antibiotics.    Review of Systems   Constitutional: Positive for fatigue. Negative for appetite change, chills, diaphoresis and fever.   HENT: Positive for congestion (chronic, stable). Negative for postnasal drip, sore throat and trouble swallowing.    Respiratory: Positive for cough and shortness of breath.    Cardiovascular: Negative for chest pain, palpitations and leg swelling.   Gastrointestinal: Negative for abdominal distention, abdominal pain, constipation, diarrhea, nausea and vomiting.   Endocrine: Negative for polydipsia, polyphagia and polyuria.   Genitourinary: Negative for dysuria, frequency and urgency.   Musculoskeletal: Negative for back pain, myalgias, neck pain and neck stiffness.   Skin: Negative for color change, pallor and rash.   Neurological: Negative for dizziness, light-headedness and headaches (chronic, well controlled on current regimen).   Psychiatric/Behavioral: Negative for agitation, behavioral problems and confusion. The patient is not nervous/anxious.      Objective:     Vital Signs (Most Recent):  Temp: 98.9 °F (37.2 °C) (10/04/18 1539)  Pulse: 76 (10/04/18 0805)  Resp: 16 (10/04/18 0805)  BP: 125/85 (10/04/18 0805)  SpO2: (!) 93 % (10/04/18 0805) Vital Signs (24h Range):  Temp:  [98 °F (36.7 °C)-98.9 °F (37.2 °C)] 98.9 °F (37.2 °C)  Pulse:  [] 76  Resp:  [16-20] 16  SpO2:  [93 %-95 %] 93 %  BP: (109-131)/(59-89) 125/85     Weight: 58.7 kg (129 lb 6.6 oz)  Body mass index is 24.45 kg/m².    Estimated Creatinine Clearance: 57 mL/min (based on SCr of 1.2 mg/dL).    Physical Exam   Constitutional: She is oriented to person, place, and time. She  appears well-developed and well-nourished. She has a sickly appearance. No distress.   HENT:   Head: Normocephalic and atraumatic.   Right Ear: External ear normal.   Left Ear: External ear normal.   Nose: Nose normal.   Mouth/Throat: Oropharynx is clear and moist. No oropharyngeal exudate.   Eyes: Conjunctivae and EOM are normal.   Neck: Normal range of motion. Neck supple. No JVD present.   Cardiovascular: Normal rate, regular rhythm, normal heart sounds and intact distal pulses. Exam reveals no gallop and no friction rub.   No murmur heard.  Pulmonary/Chest: Effort normal. She has decreased breath sounds in the right middle field, the right lower field, the left middle field and the left lower field. She has no wheezes. She has no rhonchi. She has no rales.   Abdominal: Soft. Bowel sounds are normal. She exhibits no distension. There is no tenderness. There is no guarding.   Musculoskeletal: Normal range of motion. She exhibits no edema.   Lymphadenopathy:     She has no cervical adenopathy.   Neurological: She is alert and oriented to person, place, and time. No cranial nerve deficit.   Skin: Skin is warm and dry. Capillary refill takes less than 2 seconds. No rash noted. She is not diaphoretic. No erythema. There is pallor.   Psychiatric: Her speech is normal and behavior is normal. Thought content normal. Her mood appears anxious. She exhibits a depressed mood.   Nursing note and vitals reviewed.      Significant Labs:   CBC:   Recent Labs   Lab  10/03/18   0600  10/04/18   0600   WBC  6.22  11.51   HGB  9.0*  8.7*   HCT  31.0*  29.3*   PLT  123*  145*     CMP:   Recent Labs   Lab  10/03/18   0600  10/04/18   0600   NA  139  141   K  4.8  4.3   CL  109  109   CO2  22*  22*   GLU  250*  191*   BUN  15  23*   CREATININE  1.3  1.2   CALCIUM  8.8  8.6*   PROT  7.0  6.6   ALBUMIN  3.6  3.4*   BILITOT  0.4  0.4   ALKPHOS  163*  141*   AST  19  9*   ALT  30  19   ANIONGAP  8  10   EGFRNONAA  55.6*  >60.0        Significant Imaging: I have reviewed all pertinent imaging results/findings within the past 24 hours.

## 2018-10-04 NOTE — ANESTHESIA POSTPROCEDURE EVALUATION
"Anesthesia Post Evaluation    Patient: Juanita Ibarra    Procedure(s) Performed: Procedure(s) (LRB):  BRONCHOSCOPY (N/A)    Final Anesthesia Type: general  Patient location during evaluation: PACU  Patient participation: Yes- Able to Participate  Level of consciousness: awake and alert and oriented  Post-procedure vital signs: reviewed and stable  Pain management: adequate  Airway patency: patent  PONV status at discharge: No PONV  Anesthetic complications: no      Cardiovascular status: hemodynamically stable  Respiratory status: unassisted and spontaneous ventilation  Hydration status: euvolemic  Follow-up not needed.        Visit Vitals  /85 (BP Location: Left arm, Patient Position: Lying)   Pulse 76   Temp 36.8 °C (98.2 °F) (Oral)   Resp 16   Ht 5' 1" (1.549 m)   Wt 58.7 kg (129 lb 6.6 oz)   LMP 10/02/2016   SpO2 (!) 93%   Breastfeeding? No   BMI 24.45 kg/m²       Pain/Richard Score: Pain Assessment Performed: Yes (10/4/2018  9:00 AM)  Presence of Pain: complains of pain/discomfort (10/4/2018  9:00 AM)  Pain Rating Prior to Med Admin: 8 (10/4/2018  8:28 AM)  Pain Rating Post Med Admin: 5 (10/4/2018  5:45 AM)        "

## 2018-10-04 NOTE — PLAN OF CARE
Problem: Patient Care Overview  Goal: Plan of Care Review  Outcome: Ongoing (interventions implemented as appropriate)    Pt AAOx4.   R CW port CDI.   VSS. Satting 92-95% on 2L per NC. Will need assessment of O2 needs to see if qualifies for home supplemental oxygen before D/C.  Pt receiving Meropenem Q8h. Vanc D/C'd per ID recs.   Per ID, will start to taper off Meropenem if cultures remain unremarkable @ 48 hrs.   Plan for  #3/3 today and will likely D/C Friday.   Accuchecks A/HS. HS . Pt given 5 units per sliding scale orders. Endocrine following.   Pt with multiple PRN orders for which she requests to take when available.   Benadryl 50 mg IV given Q6h for nausea.  Zanaflex given Q 6 for muscle spasms.  Ativan Q 6 given for c/o anxiety.    Oxy 10 mg given Q4h for c/o pain to chest and back.  Pt also takes Morphine 15 mg 12hr tablet scheduled BID.    Pt remained free from falls or injury thus far. Bed is in low/ locked position, side rails are up x 2, call light is in reach.   Will continue to monitor.

## 2018-10-05 NOTE — PLAN OF CARE
Reviewed BG levels and insulin regimen.     Goal BG while inpatient: 140-180 mg/dl  Current BG levels are above goal    Received 3 doses of IV methylprednisolone, which was completed on 10/4.  Currently on Prednisone 5 mg, daily.    Hyperglycemia is most likely due to steroids. Expect insulin requirements to decrease as pt completed course of high dose steroids.    Recommendations:   Moderate dose correction  POC AC/HS     If BG levels remain elevated, will consider adding low dose of prandial insulin.        Discharge:  No need for medication when not on steroids.  Anticipate saleem resolution once steroids completed.

## 2018-10-05 NOTE — PROGRESS NOTES
Critical Care MD covering for LuTx, Dr. Riley, notified of pt request for Lasix. Pt with significant increase in abdominal swelling causing difficulty breathing.   Pt satting 88% on room air. Pt placed on 2L NC for comfort. Pt states that she usually gets lasix when she gets steroids because she retains fluid.   MD came to bedside to see pt. MD placed order for 1x dose of 40 mg Lasix IV. Will administer and continue to monitor.

## 2018-10-05 NOTE — CARE UPDATE
Called to bedside for fluid retention. Patient states she gets Lasix with steroids. Noticeable peripheral edema. Lasix 40mg IV times 1 given.

## 2018-10-05 NOTE — PROGRESS NOTES
Benadryl last given at 0725, pt requesting a dose before she is DC'd. Spoke to Reyes, NP, says ok for pt to have early (before she leaves) Will continue to monitor.

## 2018-10-05 NOTE — PLAN OF CARE
Problem: Patient Care Overview  Goal: Plan of Care Review  Outcome: Ongoing (interventions implemented as appropriate)    Pt AAOx4.   R CW port CDI. Refused Dressing/ needle change.   Plan for DC today. No acute changes over night.  Pt completed SM dose #3/3 yesterday.   Pt given 1x dose of iv lasix for fluid retention. Does not measure urine.   Pt with multiple PRN orders for which she requests to take when available.   Pt remained free from falls or injury thus far. Bed is in low/ locked position, side rails are up x 2, call light is in reach.   Will continue to monitor.

## 2018-10-05 NOTE — DISCHARGE SUMMARY
"[facility]  Lung Transplant  Discharge Summary      Patient Name: Juanita Ibarra  MRN: [id number]  Admission Date: 9/27/2018  Hospital Length of Stay: 8 days  Discharge Date and Time: 10/05/2018 11:30 AM  Attending Physician: Francisca Morin DO   Discharging Provider: Reyes Will NP  Primary Care Provider: Jake Alvarez MD     HPI: Mrs. 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 Patient presents as a transfer from [facility] in [place]. Patient reports hospital admission from 8/22 to 9/16 for sinusitis for which she was treated with IV Vancomycin and Meropenem. Patient noted symptomatic improvement and was subsequently discharged home. Following discharge, patient began to note a "loose" dry cough after a few days. Patient then began to note increasing dyspnea and cough with production of dark yellow, thick sputum which prompted her to present to the ED. Patient was then hospitalized from 9/14 through her transfer and was continuing to receive IV Vancomycin and Meropenem. Patient reports no symptomatic improvement since her most recent hospitalization.     Patient continues to report dry cough, diffuse myalgias, and increased fatigue. Patient reports recently receiving the influenza vaccine following her initial discharge on 9/16. Patient reports chronic, baseline nausea. Patient notes abdominal distention but states that this usually occurs following IV abx.  Patient intermittently using 2-3 L of oxygen via NC but is currently 95% on RA. Denies chest pain, palpitations. Denies vomiting, diarrhea, and constipation. Denies fevers, chills. During previous [facility] admission in May, patient received 4 doses of Solumedrol 500 mg, Vancomycin, Cefepime, and Ribavirin. "         Procedure(s) (LRB):  BRONCHOSCOPY (N/A)     Hospital Course:   Bronchiolitis obliterans syndrome     There was initial concern for possible infectious etiology versus progression of CARLOS EDUARDO. Patient last received half pulse steroids x 4 doses in May 2018.   CT Chest 9/25 from [place] reviewed by Dr. Mae. Patient has recently received 2 weeks of IV Vancomycin and Meropenem at [place].      CT chest from [place] with new multi-focal opacities at the bilateral lung bases. Concerns for progression of CARLOS EDUARDO. BAL from 10/1 bronchoscopy with NGTD. Vancomycin discontinued 10/3, Meropenem discontinued 10/4. Placed on empiric pulse steroid therapy for suspected rejection for 3 days based on cell count from BAL. Patient qualified for home oxygen supplementation.        Lung transplant status, bilateral     BLT secondary to CF. 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 History of MRSA and pseudomonas with recent history of prolonged antibiotic therapy.    Will continue current immunosuppression and prophylaxis. Concerns for possible progression of underlying CARLOS EDUARDO given her lack of improvement with antibiotic therapy. Patient last received empiric half pulse steroids in May 2018. Bronchoscopy performed 10/1, BAL with NGTD. Received pulse steroids for suspected rejection based on cell count.         Prophylactic antibiotic     Continue Dapsone 100 mg daily.         Immunosuppression     Current outpatient regimen: Tacrolimus 3.5 mg, Prednisone 5 mg daily, and  mg BID.      Will continue current outpatient regimen. Continue daily monitoring of tacrolimus levels and dose adjust accordingly.        Sinusitis, chronic     Symptomatically at baseline. Continue with Allegra, Singulair, and Flonase.         Anemia     Received 1 unit of PRBCs 10/2. H/H stable. Reports improvement in SOB. Will  continue to monitor.             Consults (From admission, onward)        Status Ordering Provider     Inpatient consult to Endocrinology  Once     Provider:  (Not yet assigned)    Completed ARTURO LIU     Inpatient consult to Infectious Diseases  Once     Provider:  (Not yet assigned)    Completed ARTURO LIU          Significant Diagnostic Studies: Labs:   BMP:   Recent Labs   Lab  10/04/18   0600  10/05/18   0600   GLU  191*  161*   NA  141  142   K  4.3  4.3   CL  109  108   CO2  22*  26   BUN  23*  34*   CREATININE  1.2  1.8*   CALCIUM  8.6*  8.9   MG  2.0  1.9   , CMP   Recent Labs   Lab  10/04/18   0600  10/05/18   0600   NA  141  142   K  4.3  4.3   CL  109  108   CO2  22*  26   GLU  191*  161*   BUN  23*  34*   CREATININE  1.2  1.8*   CALCIUM  8.6*  8.9   PROT  6.6  6.5   ALBUMIN  3.4*  3.4*   BILITOT  0.4  0.5   ALKPHOS  141*  128   AST  9*  10   ALT  19  19   ANIONGAP  10  8   ESTGFRAFRICA  >60.0  43.2*   EGFRNONAA  >60.0  37.5*    and CBC   Recent Labs   Lab  10/04/18   0600  10/05/18   0600   WBC  11.51  12.00   HGB  8.7*  9.1*   HCT  29.3*  29.7*   PLT  145*  132*     Microbiology:   Blood Culture   Lab Results   Component Value Date    LABBLOO No growth after 5 days. 05/25/2018    and Sputum Culture   Lab Results   Component Value Date    GSRESP Rare WBC's 10/01/2018    GSRESP No organisms seen 10/01/2018    GSRESP Rare WBC's 10/01/2018    GSRESP No organisms seen 10/01/2018    RESPIRATORYC Normal respiratory anibal 10/01/2018    RESPIRATORYC Normal respiratory anibal 10/01/2018     Radiology: X-Ray: CXR: X-Ray Chest 1 View (CXR): No results found for this visit on 09/27/18. and X-Ray Chest PA and Lateral (CXR):   Results for orders placed or performed during the hospital encounter of 09/27/18   X-Ray Chest PA And Lateral    Narrative    EXAMINATION:  XR CHEST PA AND LATERAL    CLINICAL HISTORY:  hypoxia;    TECHNIQUE:  PA and lateral views of the chest were  performed.    COMPARISON:  September 27, 2018    FINDINGS:  Indwelling central venous catheter and surgical changes in the chest and upper abdomen remain.The lungs are clear, with normal appearance of pulmonary vasculature and no pleural effusion or pneumothorax.    The cardiac silhouette is normal in size. The hilar and mediastinal contours are unremarkable.    Bones are intact.      Impression    No detrimental change since September 27, 2018.      Electronically signed by: Susan Banks MD  Date:    10/04/2018  Time:    14:29       Pending Diagnostic Studies:     None        Final Active Diagnoses:    Diagnosis Date Noted POA    PRINCIPAL PROBLEM:  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] 12/19/2016 Yes    Lung transplant status, bilateral [Z94.2] 06/14/2014 Not Applicable    Immunosuppression [D89.9] 06/12/2014 Yes    Prophylactic antibiotic [Z79.2] 06/30/2014 Not Applicable    Sinusitis, chronic [J32.9] 07/26/2013 Yes    Anemia [D64.9] 09/05/2014 Yes    Adverse effect of anabolic steroid [T38.7X5A] 10/03/2018 Unknown    Steroid-induced hyperglycemia [R73.9, T38.0X5A] 10/03/2018 Unknown    Pneumonia [J18.9] 05/24/2018 Yes      Problems Resolved During this Admission:      Discharged Condition: stable    Disposition: Home or Self Care    Medications:  Reconciled Home Medications:      Medication List      START taking these medications    levalbuterol 1.25 mg/3 mL nebulizer solution  Commonly known as:  XOPENEX  Take 3 mLs (1.25 mg total) by nebulization every 8 (eight) hours as needed for Wheezing or Shortness of Breath. Rescue     venlafaxine 75 MG 24 hr capsule  Commonly known as:  EFFEXOR-XR  Take 1 capsule (75 mg total) by mouth every evening.        CHANGE how you take these medications    amLODIPine 5 MG tablet  Commonly known as:  NORVASC  Take 2 tablets (10 mg total) by mouth once daily. Hold if SBP < 110  What changed:  additional instructions     gabapentin 300 MG capsule  Commonly known as:   NEURONTIN  Take 1 capsule (300 mg total) by mouth 3 (three) times daily.  What changed:  how much to take        CONTINUE taking these medications    ARIPiprazole 2 MG Tab  Commonly known as:  ABILIFY  Take 2 mg by mouth once daily.     butalbital-acetaminophen-caffeine -40 mg -40 mg per tablet  Commonly known as:  FIORICET, ESGIC  TAKE 1 TABLET BY MOUTH EVERY 6 HOURS AS NEEDED FOR HEADACHE     calcium-vitamin D3 500 mg(1,250mg) -200 unit per tablet  Commonly known as:  OS-KATY 500 + D3  Take 1 tablet by mouth 2 (two) times daily with meals.     dapsone 100 MG Tab  Take 1 tablet (100 mg total) by mouth once daily.     diphenhydrAMINE 50 MG tablet  Commonly known as:  BENADRYL  Take 1 tablet (50 mg total) by mouth every 6 (six) hours as needed for Itching.     DULERA 100-5 mcg/actuation Hfaa  Generic drug:  mometasone-formoterol  INHALE 1 PUFF BY MOUTH TWICE DAILY     fexofenadine 180 MG tablet  Commonly known as:  ALLEGRA  Take 180 mg by mouth once daily.     fluconazole 150 MG Tab  Commonly known as:  DIFLUCAN  TAKE 1 TABLET BY MOUTH EVERY WEEK     fluticasone 50 mcg/actuation nasal spray  Commonly known as:  FLONASE  INSERT 2 SPRAYS IN EACH NOSTRIL DAILY     folic acid 1 MG tablet  Commonly known as:  FOLVITE  Take 1 tablet (1,000 mcg total) by mouth once daily.     levalbuterol 45 mcg/actuation inhaler  Commonly known as:  XOPENEX HFA  INHALE 1 TO 2 PUFFS INTO THE LUNGS EVERY 6 HOURS AS NEEDED FOR WHEEZING OR SHORTNESS OF BREATH. USE WITH SPACER.     lipase-protease-amylase 36,000-114,000- 180,000 unit Cpdr  Commonly known as:  CREON  Take 4 capsules by mouth 3 (three) times daily with meals. Take 3 with snacks prn.     lisinopril 10 MG tablet  Take 10 mg by mouth once daily.     LORazepam 1 MG tablet  Commonly known as:  ATIVAN  Take 1 mg by mouth every 6 (six) hours as needed for Anxiety.     magnesium oxide 400 mg tablet  Commonly known as:  MAG-OX  Take 1 tablet (400 mg total) by mouth 2 (two)  times daily.     mirtazapine 30 MG tablet  Commonly known as:  REMERON  Take 30 mg by mouth every evening.     montelukast 10 mg tablet  Commonly known as:  SINGULAIR  Take 1 tablet (10 mg total) by mouth once daily.     morphine 15 MG 12 hr tablet  Commonly known as:  MS CONTIN  Take 15 mg by mouth 3 (three) times daily.     multivit,min52-folic-vitK-cQ10 100-700-10 mcg-mcg-mg Cap cap  Commonly known as:  AQUADEKS  1 tab twice daily     mycophenolate 250 mg Cap  Commonly known as:  CELLCEPT  Take 2 capsules (500 mg total) by mouth 2 (two) times daily.     omeprazole 40 MG capsule  Commonly known as:  PRILOSEC  TAKE 1 CAPSULE BY MOUTH EVERY MORNING     ondansetron 8 MG tablet  Commonly known as:  ZOFRAN  Take 1 tablet (8 mg total) by mouth every 8 (eight) hours as needed for Nausea.     oxyCODONE 5 MG immediate release tablet  Commonly known as:  ROXICODONE  Take 10 mg by mouth every 4 (four) hours as needed for Pain.     polyethylene glycol 17 gram Pwpk  Commonly known as:  GLYCOLAX  Take 17 g by mouth 2 (two) times daily.     predniSONE 5 MG tablet  Commonly known as:  DELTASONE  Take 1 tablet (5 mg total) by mouth once daily.     PROCHAMBER  Generic drug:  inhalation spacing device  USE AS DIRECTED     promethazine 25 MG tablet  Commonly known as:  PHENERGAN  Take 1 tablet (25 mg total) by mouth every 8 (eight) hours as needed.     * tacrolimus 0.5 MG Cap  Commonly known as:  PROGRAF  Take 1 capsule (0.5 mg total) by mouth every 12 (twelve) hours.     * tacrolimus 1 MG Cap  Commonly known as:  PROGRAF  Take 3 capsules (3 mg total) by mouth every 12 (twelve) hours. Daily doses: 3.5 mg every 12 hours.     tiZANidine 4 MG tablet  Commonly known as:  ZANAFLEX  TAKE 2 TABLETS BY MOUTH EVERY 6 HOURS AS NEEDED     topiramate 25 MG tablet  Commonly known as:  TOPAMAX  Take 1 tablet (25 mg total) by mouth 2 (two) times daily.         * This list has 2 medication(s) that are the same as other medications prescribed for  "you. Read the directions carefully, and ask your doctor or other care provider to review them with you.            \  Patient Instructions:      OXYGEN FOR HOME USE     Order Specific Question Answer Comments   Liter Flow 2    Duration With activity    Qualifying SpO2: 88% on room air with activity    Testing done at: Exercise/Activity    Route nasal cannula    Device home concentrator with portable unit    Length of need (in months): 99 mos    Patient condition with qualifying saturation  CARLOS EDUARDO   Height: 5' 1" (1.549 m)    Weight: 58.7 kg (129 lb 6.6 oz)    Alternative treatment measures have been tried or considered and deemed clinically ineffective. Yes    Vendor: [employer]    Expected Date of Delivery: 10/4/2018      NEBULIZER FOR HOME USE     Order Specific Question Answer Comments   Height: 5' 1" (1.549 m)    Weight: 58.7 kg (129 lb 6.6 oz)    Length of need (1-99 months): 99      Diet Adult Regular     Notify your health care provider if you experience any of the following:  increased confusion or weakness     Notify your health care provider if you experience any of the following:  persistent dizziness, light-headedness, or visual disturbances     Notify your health care provider if you experience any of the following:  worsening rash     Notify your health care provider if you experience any of the following:  severe persistent headache     Notify your health care provider if you experience any of the following:  difficulty breathing or increased cough     Notify your health care provider if you experience any of the following:  redness, tenderness, or signs of infection (pain, swelling, redness, odor or green/yellow discharge around incision site)     Notify your health care provider if you experience any of the following:  severe uncontrolled pain     Notify your health care provider if you experience any of the following:  persistent nausea and vomiting or diarrhea     Notify your health care provider if you " experience any of the following:  temperature >100.4     Activity as tolerated     Follow Up:  Follow-up Information     Jake Alvarez MD On 10/22/2018.    Specialty:  Transplant  Contact information:  [facility] [address]  [facility] [address]  [phone]                   Reyes Will NP  Lung Transplant  [facility]

## 2018-10-05 NOTE — PROGRESS NOTES
Pt discharged per MD's order. PAC removed, per pt, heparin put in line. Discharge instructions reviewed with pt, verbalized understanding. All belongings with pt. Pt discharged via wheelchair, with this RN. Mother in law waiting at main entrance for pt.

## 2018-10-05 NOTE — PROGRESS NOTES
Patient being discharged home today.  Discharge instructions and outpatient plan of care as determined by Dr. Morin were reviewed with her as follows:  1.  Per instructions from Yasmani Rain PharmD, and approved by Dr. Morin, take both amlodipine and lisinopril if SBP > 110.  If SBP < 110, take only lisinopril.    2.  Labs (BMP and tacrolimus level) on Tuesday, October 9, 2018.  Instructed patient to take her morning Prograf dose after her blood is drawn this day.  She asked that this lab appointment be scheduled at the [facility] location in the [facility].    3.  Return for a chest x-ray, labs, spirometry and lung transplant clinic visit in 2-3 weeks.  Patient asked for these appointments to be scheduled on Monday, October 22, 2018.  Reviewed times, locations, and special instructions (e.g., take morning Prograf dose after blood is drawn) for these appointments.  Provided verbal and written instructions re: this discharge plan.  The patient asked questions, which were answered to her satisfaction.  She verbalized her understanding of all information discussed and demonstrated her readiness for discharge.

## 2018-10-09 NOTE — TELEPHONE ENCOUNTER
----- Message from Apolonia Machuca sent at 10/9/2018 12:12 PM CDT -----  Pt calling to speak with Zuleima Calero    Pt contact [phone]

## 2018-10-09 NOTE — TELEPHONE ENCOUNTER
Returned call to patient.  David Rodriguez did not receive her lab orders.  Orders re-faxed to number provided by the patient.

## 2018-10-11 NOTE — TELEPHONE ENCOUNTER
Patient contacted by DARCI Williamson MA - informed that a post hospital discharge appt is scheduled on 10/22/18. Patient verbalized her understanding.

## 2018-10-11 NOTE — TELEPHONE ENCOUNTER
----- Message from Maria Dolores Roldan sent at 10/11/2018  2:29 PM CDT -----  Contact: Pt  Pt states she was discharged from the hospital and would like a follow up appt    Pt contact number [phone]  Thanks

## 2018-10-22 NOTE — TELEPHONE ENCOUNTER
----- Message from Jake Alvarez MD sent at 10/22/2018  2:22 PM CDT -----  Drop PM dose to 3 mg and encourage hydration with water...not coke.

## 2018-10-22 NOTE — TELEPHONE ENCOUNTER
Received written orders from Dr. Alvarez instructing patient to decrease evening dose of Prograf to 3 mg and to keep the morning dose the same at 3.5 mg.  Contacted patient via [facility] asking her to make the above dose change.  Patient was also instructed to increase oral hydration with water.  Will have patient repeat labs at [facility] on Thursday 10/25/18. Asked patient to confirm that message was received.

## 2018-10-22 NOTE — PROGRESS NOTES
LUNG TRANSPLANT RECIPIENT FOLLOW-UP    Reason for Visit: Follow-up after lung transplantation.      Date of Transplant: 6/12/14     Reason for Transplant: Cystic fibrosis      Type of Transplant: bilateral sequential lung     CMV Status: D+ / R-      Major Complications:   1. Vocal cord dysfunction- resolved   2. A1 Rejection 8/2014   3. C glabrata positive sputum-recurrent  4. Pseudomonas pneumonia in 02/2015 resolved   5. 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 CARLOS EDUARDO (grade 3)                                                                               History of Present Illness: Juanita Ibarra is a 28 y.o. year old female with the above listed transplant history who presents for routine follow-up.  She was recently admitted for pneumonia and bronchoscopy performed confirmed it. She also had a biopsy performed which was negative for rejection but her fluid had a very strong inflammatory component and she was treated as rejection regardless. She says that she is feeling well today. She will still have a cough which is mostly dry. She will have shortness of breath but this is variable and sometimes needs to place her oxygen on exertion. Her chest pains are unchanged. Denies fevers or weight loss.     Review of Systems   Constitutional: Negative for chills, diaphoresis, fever, malaise/fatigue and weight loss.   HENT: Negative for congestion, ear discharge, ear pain, hearing loss, nosebleeds and sore throat.    Eyes: Negative for blurred vision, double vision, photophobia and pain.   Respiratory: Positive for cough and shortness of breath. Negative for hemoptysis, sputum production, wheezing and stridor.    Cardiovascular: Negative for chest pain, palpitations, orthopnea, claudication, leg swelling and PND.   Gastrointestinal: Negative for abdominal pain, blood in stool, constipation, diarrhea, heartburn, melena, nausea and vomiting.   Genitourinary: Negative for dysuria, flank pain, frequency, hematuria and urgency.  "  Musculoskeletal: Positive for myalgias. Negative for back pain, falls, joint pain and neck pain.   Skin: Negative for itching and rash.   Neurological: Negative for dizziness, tingling, tremors, sensory change, focal weakness, seizures, loss of consciousness, weakness and headaches.   Endo/Heme/Allergies: Does not bruise/bleed easily.   Psychiatric/Behavioral: Negative for depression, hallucinations, memory loss and suicidal ideas. The patient is not nervous/anxious and does not have insomnia.      /68   Pulse (!) 120   Temp 96.3 °F (35.7 °C) (Oral)   Resp 20   Ht 5' 1" (1.549 m)   Wt 57.6 kg (127 lb)   LMP 10/02/2016   SpO2 97%   BMI 24.00 kg/m²     Physical Exam   Constitutional: She is oriented to person, place, and time and well-developed, well-nourished, and in no distress. No distress.   HENT:   Head: Normocephalic and atraumatic.   Nose: Nose normal.   Mouth/Throat: Oropharynx is clear and moist. No oropharyngeal exudate.   Eyes: Conjunctivae and EOM are normal. Pupils are equal, round, and reactive to light. No scleral icterus.   Neck: Normal range of motion. Neck supple. No JVD present. No tracheal deviation present. No thyromegaly present.   Cardiovascular: Normal rate, regular rhythm and intact distal pulses. Exam reveals no gallop and no friction rub.   No murmur heard.  Pulmonary/Chest: Effort normal and breath sounds normal. No stridor. No respiratory distress. She has no wheezes. She has no rales. She exhibits no tenderness.   Abdominal: Soft. Bowel sounds are normal. She exhibits no distension and no mass. There is no tenderness. There is no rebound and no guarding.   Musculoskeletal: Normal range of motion. She exhibits no edema.   Lymphadenopathy:     She has no cervical adenopathy.   Neurological: She is alert and oriented to person, place, and time. No cranial nerve deficit. Gait normal. Coordination normal. GCS score is 15.   Skin: Skin is warm and dry. No rash noted. She is not " diaphoretic. No erythema. No pallor.   Psychiatric: Mood and affect normal.     Labs:  cbc, cmp, tacrolimus Latest Ref Rng & Units 10/4/2018 10/5/2018 10/22/2018   TACROLIMUS LVL 5.0 - 15.0 ng/mL 6.6 6.7 -   WHITE BLOOD CELL COUNT 3.90 - 12.70 K/uL 11.51 12.00 5.76   RBC 4.00 - 5.40 M/uL 3.44(L) 3.52(L) 3.38(L)   HEMOGLOBIN 12.0 - 16.0 g/dL 8.7(L) 9.1(L) 8.9(L)   HEMATOCRIT 37.0 - 48.5 % 29.3(L) 29.7(L) 30.7(L)   MCV 82 - 98 fL 85 84 91   MCH 27.0 - 31.0 pg 25.3(L) 25.9(L) 26.3(L)   MCHC 32.0 - 36.0 g/dL 29.7(L) 30.6(L) 29.0(L)   RDW 11.5 - 14.5 % 17.2(H) 17.5(H) 22.9(H)   PLATELETS 150 - 350 K/uL 145(L) 132(L) 135(L)   MPV 9.2 - 12.9 fL 12.4 13.0(H) 11.6   GRAN # 1.8 - 7.7 K/uL 10.0(H) 10.1(H) -   LYMPH # 1.0 - 4.8 K/uL 0.9(L) 1.2 -   MONO # 0.3 - 1.0 K/uL 0.5 0.6 -   EOSINOPHIL% 0.0 - 8.0 % 0.0 0.0 -   BASOPHIL% 0.0 - 1.9 % 0.1 0.1 -   DIFFERENTIAL METHOD - Automated Automated -   SODIUM 136 - 145 mmol/L 141 142 137   POTASSIUM 3.5 - 5.1 mmol/L 4.3 4.3 4.3   CHLORIDE 95 - 110 mmol/L 109 108 110   CO2 23 - 29 mmol/L 22(L) 26 19(L)   GLUCOSE 70 - 110 mg/dL 191(H) 161(H) 148(H)   BUN BLD 6 - 20 mg/dL 23(H) 34(H) 24(H)   CREATININE 0.5 - 1.4 mg/dL 1.2 1.8(H) 1.5(H)   CALCIUM 8.7 - 10.5 mg/dL 8.6(L) 8.9 9.1   PROTEIN TOTAL 6.0 - 8.4 g/dL 6.6 6.5 7.1   ALBUMIN 3.5 - 5.2 g/dL 3.4(L) 3.4(L) 3.8   BILIRUBIN TOTAL 0.1 - 1.0 mg/dL 0.4 0.5 0.5   ALK PHOS 55 - 135 U/L 141(H) 128 136(H)   AST 10 - 40 U/L 9(L) 10 24   ALT 10 - 44 U/L 19 19 32   ANION GAP 8 - 16 mmol/L 10 8 8   EGFR IF AFRICAN AMERICAN >60 mL/min/1.73 m:2 >60.0 43.2(A) 53.9(A)   EGFR IF NON-AFRICAN AMERICAN >60 mL/min/1.73 m:2 >60.0 37.5(A) 46.7(A)     Pulmonary Function Tests 10/22/2018 6/18/2018 4/23/2018 1/15/2018 11/13/2017 9/25/2017 7/25/2017   FVC 2.23 2.52 2.21 2.2 2.03 2.3 2.36   FEV1 0.9 0.93 0.88 0.9 0.84 0.88 0.94   TLC (liters) - - - - - - -   DLCO (ml/mmHg sec) - - - - - - -   FVC% 65.3 78 69 68 63 71 73   FEV1% 30.9 33 31 32 30 31 33   FEF 25-75  0.35 0.35 0.36 0.39 0.33 0.35 0.36   FEF 25-75% 10.5 11 11 12 10 10 11   TLC% - - - - - - -   RV - - - - - - -   RV% - - - - - - -   DLCO% - - - - - - -       Imaging:  Results for orders placed during the hospital encounter of 10/22/18   X-Ray Chest PA And Lateral    Narrative EXAMINATION:  XR CHEST PA AND LATERAL    CLINICAL HISTORY:  Lung transplant status    TECHNIQUE:  PA and lateral views of the chest were performed.    COMPARISON:  Chest radiograph 10/04/2018    FINDINGS:  There is an indwelling central venous catheter in stable position as well as sternotomy wires aligned and intact.    Lung volumes are normal and symmetric.  The lungs are clear with no evidence of consolidation, pleural effusion, pneumothorax, or pulmonary edema.    The cardiac silhouette is normal in size. The hilar and mediastinal contours are unremarkable.    Bones are intact with no evidence of acute fracture.      Impression No interval detrimental change when compared to radiograph 10/04/2018.    Electronically signed by resident: Hi Loera  Date:    10/22/2018  Time:    08:44    Electronically signed by: Christos Deleon MD  Date:    10/22/2018  Time:    09:43         Assessment:  1. Encounter following organ transplant    2. Bronchiolitis obliterans syndrome    3. Chronic respiratory failure with hypoxia    4. Immunosuppression    5. Prophylactic antibiotic    6. Chronic kidney disease (CKD), stage III (moderate)      Plan:   1. Will continue to monitor her FEV1 which remains stable and still meeting criteria for severe CARLOS EDUARDO. I explained to Gisel that her disease will progress in the next couple of years and that eventually she will need a re-transplant. She is aware and understands. No acute issues to address at the moment.     2. Continue to use her oxygen as needed. I encouraged exercise. Will order echocardiogram to evaluate her RVSP.    3. Continue tacrolimus, mycophenolate, and prednisone.     4. Continue dapsone.     5. Continue  to monitor her renal function which remains moderately decreased but stable.     6. RTC in 3 months or earlier if needed.

## 2018-10-24 NOTE — TELEPHONE ENCOUNTER
Patient messaged asking to resend the prescription for Xopenex as the pharmacy did not receive the it.  ERx entered and sent to Dr. Alvarez for review and approval.

## 2018-10-29 NOTE — TELEPHONE ENCOUNTER
----- Message from Kierra Snyder sent at 10/29/2018  3:08 PM CDT -----  Contact: patient  Please call above patient need to be seen ASAP waiting on a call from the nurse

## 2018-10-31 NOTE — PROGRESS NOTES
[facility]  Department of Otorhinolaryngology and Communication Sciences    Subjective:      Juanita Ibarra is a 28 y.o. female who presents for follow-up. She has a history of left vocal fold paralysis following lung transplant in June 2014. She underwent laryngeal framework surgery as outlined below.      TREATMENT HISTORY:  8/2/2016 - Left medialization laryngoplasty, adduction arytenopexy, cricothyroid subluxation    She thinks her implant is loose or something. Feels it when she is swallowing or eating or taking a sip of something. Her voice is still very good; no deterioration. This started about 2 weeks ago. She was coughing vigorously around that time for a while from a presumed pneumonia. The coughing has now settles.    She underwent updated modified barium swallow study after her thyroplasty/AdAp on 9/2/2016. Findings were improved, with only mild pharyngeal dysphagia and no evidence of aspiration or penetration. Due to the patient's concern for swallowing deterioration, she underwent updated MBSS 11/7/2017. Findings were as follows:  oropharyngeal swallow function WNL. No evidence of penetration or aspiration.    The patient's medications, allergies, past medical, surgical, social and family histories were reviewed and updated as appropriate.    A detailed review of systems was obtained with pertinent positives as per the above HPI, and otherwise negative.      Objective:     /78   Pulse (!) 129   Temp 98.4 °F (36.9 °C)   Wt 56.7 kg (125 lb 0 oz)   LMP 10/02/2016   BMI 23.62 kg/m²     Constitutional: comfortable, well dressed  Psychiatric: appropriate affect  Respiratory: comfortably breathing, symmetric chest rise, no stridor  Voice: minimal variable roughness/strain; durable, significant interval improvements; normal for age and gender  Head: normocephalic  Eyes: conjunctivae and sclerae clear  Indirect laryngoscopy is limited due to gag    Procedure  Flexible Laryngeal  Videostroboscopy (37650): Laryngeal videostroboscopy is indicated to assess laryngeal vibratory biomechanics and vocal fold oscillation, which cannot be assessed with a plain light examination. This was carried out transnasally with a distal chip videoendoscope. After verbal consent was obtained, the patient was positioned and the nose was topically decongested with 1% phenylephrine and topically anesthetized with 4% lidocaine. The endoscope was passed through the most patent nasal cavity and positioned to image the nasopharynx, larynx, and hypopharynx in detail. The following features were examined: nasopharyngeal, laryngeal, hypopharyngeal masses; velopharyngeal strength, closure, and symmetry of motion; vocal fold range and symmetry of motion; laryngeal mucosal edema, erythema, inflammation, and hydration; salivary pooling; and gross laryngeal sensation. During phonation, the vocal folds were assessed for glottal closure; mucosal wave; vocal fold lesions; vibratory periodicity, amplitude, and phase symmetry; and vertical height match. The equipment was removed. The patient tolerated the procedure well without complication. All findings were normal except:  - left vocal fold immobile, paramedian position; durable improvement in vertical height match  - complete glottal closure; symmetric vibration  - mild persistent supraglottic hyperfunction/spasm  - no pooling  - implant in appropriate position; no evidence of mucosal injury or endoluminal implant; arytenoid position stable    Assessment:     Juanita Ibarra is a 28 y.o. female with left vocal fold paralysis following lung transplant. She is doing well following laryngeal framework surgery.       Plan:     Reassurance was provided. She will follow up with me in the future on an as-needed basis.    All questions were answered, and the patient is in agreement with the plan.    Gary Null M.D.  [facility]  Department of  Otorhinolaryngology and Communication Sciences

## 2018-11-06 PROBLEM — A41.9 SEPSIS: Status: ACTIVE | Noted: 2018-01-01

## 2018-11-07 PROBLEM — N17.9 ACUTE KIDNEY FAILURE: Status: ACTIVE | Noted: 2018-01-01

## 2018-11-07 PROBLEM — T40.2X1A OPIOID OVERDOSE: Status: ACTIVE | Noted: 2018-01-01

## 2018-11-07 NOTE — ASSESSMENT & PLAN NOTE
27 y/o F PMhx CF s/p bilateral lung transplant CF complicated by A1 Rejection 8/2014, recurrent C glabrata positive sputum, CMV viremia 7/2015, and CARLOS EDUARDO who presented to [facility] in [place] w/ encephalopathy s/[place] [facility] [place] Per  beginning Sunday evening patient feeling unwell & w/ poor PO intake. Since that time he states she progressively became more lethargic and  decided to bring her to the ED. On presentation patient hypotensive to 83/52, found to have pH 6.9, LISBETH w/ creatinine to 6.1 w/ electrolyte derangements. She was started on bicarb gtt and Narcan gtt as well as vanc & zosyn. ID consulted for antibiotic management due to concern for sepsis.    Based on history, physical exam, imaging, and lab results patient's currently clinical status likely 2/2 acidosis 2/2 acute renal failure (creatinine on admission 6.1, baseline creatinine .9-1.2) likely 2/2 poor PO intake in conjunction w/ nephrotoxic medications (including lisinopril, cellcept, tracrolimus). Creatinine 1.5 on 10/22 (creatinine possibly progressively worsened since that point). Encephalopathy likely 2/2 severe acidemia, and opiates & benzos (which will take longer to clear 2/2 severe renal failure). Convulsions likely 2/2 benzo withdrawal.  Patient to undergo dialysis today.    Patient clinical status unlikely 2/2 infection, however, given the severity of patient's illness would continue w/ antibiotics for now   Recommend d/c vanc and zosyn (both of which are nephrotoxic) and switch to cefepime (per  pt has received this medication in the past w/o allergic reaction)  F/u urine cx, blood cx  F/u RVP

## 2018-11-07 NOTE — SUBJECTIVE & OBJECTIVE
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       Past Surgical History:   Procedure Laterality Date    ABDOMINAL SURGERY      peg tube     [id number] N/A 5/19/2015    Performed by Deny Dias Jr., MD at [facility] OR    BIOPSY-BRONCHUS N/A 12/20/2016    Performed by Jake Alvarez MD at [facility] OR 2ND FLR    BRONCHOSCOPY N/A 5/29/2018    Procedure: BRONCHOSCOPY; Bronchoscopy with BAL and transbronchial biopsies under general anesthesia;  Surgeon: Jake Alvarez MD;  [facility];  Service: Transplant;  Laterality: N/A;    BRONCHOSCOPY N/A 10/1/2018    Procedure: BRONCHOSCOPY;  Surgeon: Jake Alvarez MD;  [facility];  Service: Transplant;  Laterality: N/A;    BRONCHOSCOPY N/A 10/1/2018    Performed by Jake Alvarez MD at [facility] OR 2ND [facility]LR    BRONCHOSCOPY Bilateral 12/20/2016    Performed by Jake Alvarez MD at [facility] OR [facility]    BRONCHOSCOPY - flexible bronchoscopy with probable tissue biopsy CPT 47836 N/A 7/21/2015    Performed by Jake Alvarez MD at [facility] OR [facility]ND [facility]    BRONCHOSCOPY - flexible bronchoscopy with probable tissue biospy CPT 88431 N/A 10/20/2015    Performed by Jake Alvarez MD at [facility] OR [facility]    BRONCHOSCOPY - flexible bronchoscopy with tissue biopsy CPT 98992 N/A 9/29/2015    Performed by Jake Alvarez MD at [facility] OR [facility]ND [facility]    BRONCHOSCOPY - flexible bronchoscopy with tissue biopsy CPT 55165 N/A 5/26/2015    Performed by Jake Alvarez MD at [facility] OR 1ST FLR    BRONCHOSCOPY flexible bronchoscopy with tissue biopsy  N/A 9/8/2014    Performed by Jake Alvarez MD at [facility] OR [address] [place] [address]    BRONCHOSCOPY flexible with possible tissue biopsy N/A 8/8/2014    Performed by Jake Alvarez MD at [facility] OR [address] [place] [address]    BRONCHOSCOPY, BAL, BIOPSIES N/A 3/20/2018    Performed by Jake Alvarez MD at [facility] OR [address] [place] [address]    BRONCHOSCOPY-FIBEROPTIC N/A 1/29/2016    Performed by Joann Cifuentes DO at [facility] OR [address] [place] [address]    BRONCHOSCOPY; Bronchoscopy with BAL and transbronchial biopsies under general anesthesia N/A 5/29/2018    Performed by Jake Alvarez MD at [facility] OR [address] [place] [address]    CHOLECYSTECTOMY  2016    CHOLECYSTECTOMY-LAPAROSCOPIC N/A 7/22/2016    Performed by Joshua Goldberg, MD at [facility] OR [address] [place] [address]    ENDOMETRIAL ABLATION  2015    KJB    FESS      FESS Bilateral 10/21/2013    Performed by Jared Whatley MD at [facility] OR [address] [place] [address]    FINE NEEDLE ASPIRATION (FNA)  of a cervical lymphadenopathy  1/29/2016    Performed by Joann Cifuentes DO at [facility] OR [address] [place] [address]    flex bronchoscopy with probable tissue biopsy N/A 7/31/2014    Performed by [facility] at [facility] OR [address] [place] [address]    flexible bronchoscopy with tissue biopsy N/A 12/11/2014    Performed by Jake Alvarez MD at [facility] OR [address] [place] [address]    INJECTION-VOCAL CORD Left 6/24/2014    Performed by Jared Whatley MD at [facility] OR [address] [place] [address]    [id number]-N-CATH to right chest and removal of portacath to left chests wall. Bilateral 6/24/2014    Performed by Zhen Dorado MD at [facility] OR [address] [place] [address]    LARYNX SURGERY  2016    LUNG TRANSPLANT Bilateral 6/12/14    MYRINGOTOMY W/ TUBES Right 04/2017    MYRINGOTOMY WITH INSERTION OF PE TUBES Right 4/15/2017    [facility]    PLACEMENT-TUBE-PEG  5/15/2014    [facility]    PORTACATH PLACEMENT Right     rt sc    REMOVAL-TUBE-PEG  5/15/2014    Performed by David Moses MD at [facility] OR [address] [place] [address]    ROBOTIC ASSISTED LAPAROSCOPIC HYSTERECTOMY N/A  4/25/2017    Performed by Deny Dias Jr., MD at [facility] OR    SALPINGECTOMY Bilateral 2015    KJB    SALPINGECTOMY-LAPAROSCOPIC Bilateral 5/19/2015    Performed by Deny Dias Jr., MD at [facility] OR    SINUS SURGERY FUNCTIONAL ENDOSCOPIC WITH NAVIGATION Bilateral 4/3/2017    [facility]    SINUS SURGERY FUNCTIONAL ENDOSCOPIC WITH NAVIGATION, 93539, 78064, 27997, 63636 Bilateral 2/5/2015    Performed by Cesar Olsen MD at [facility] OR [address] [place] [address]    THYROPLASTY - Medialization laryngoplasty, cricothyroid subluxation, arytenoid repositioning Left 8/2/2016    Performed by Gary Null MD at [facility] OR [address] [place] [address]    TRANSPLANT-LUNG Bilateral 6/11/2014    Performed by Adolfo Huston MD at [facility] OR [address] [place] [address]       Review of patient's allergies indicates:   Allergen Reactions    Albuterol Palpitations    Colistin Anaphylaxis    Vancomycin analogues      Infusion reaction that does not resolve with slowing    Neupogen [filgrastim] Other (See Comments)     Ostealgia after five daily doses of 300 mcg.      Bactrim [sulfamethoxazole-trimethoprim] Hives    Ceftazidime Hives     Pt stated can tolerate cefapine not ceftazidime    Ceftazidime     Dronabinol Other (See Comments)     Mental changes/hallucinations    Haldol [haloperidol lactate] Other (See Comments)     Seizure like activity    Nsaids (non-steroidal anti-inflammatory drug)      Cannot have due to lung transplant    Adhesive Rash     Cloth tape- please use tegaderm or paper tape    Aztreonam Rash    Ciprofloxacin Nausea And Vomiting     Projectile N/V, per patient.  Unwilling to retry therapy.       Medications:  Medications Prior to Admission   Medication Sig    aripiprazole (ABILIFY) 2 MG Tab Take 2 mg by mouth once daily.    butalbital-acetaminophen-caffeine -40 mg (FIORICET, ESGIC) -40 mg per tablet TAKE 1 TABLET BY MOUTH EVERY 6 HOURS AS NEEDED FOR HEADACHE    calcium-vitamin D3 500  mg(1,250mg) -200 unit per tablet Take 1 tablet by mouth 2 (two) times daily with meals.    dapsone 100 MG Tab Take 1 tablet (100 mg total) by mouth once daily.    diphenhydrAMINE (BENADRYL) 50 MG tablet Take 1 tablet (50 mg total) by mouth every 6 (six) hours as needed for Itching.    DULERA 100-5 mcg/actuation HFAA INHALE 1 PUFF BY MOUTH TWICE DAILY    fexofenadine (ALLEGRA) 180 MG tablet Take 180 mg by mouth once daily.    fluconazole (DIFLUCAN) 150 MG Tab TAKE 1 TABLET BY MOUTH EVERY WEEK    fluticasone (FLONASE) 50 mcg/actuation nasal spray INSERT 2 SPRAYS IN EACH NOSTRIL DAILY    folic acid (FOLVITE) 1 MG tablet Take 1 tablet (1,000 mcg total) by mouth once daily.    gabapentin (NEURONTIN) 300 MG capsule Take 1 capsule (300 mg total) by mouth 3 (three) times daily. (Patient taking differently: Take 1,100 mg by mouth 3 (three) times daily. )    levalbuterol (XOPENEX) 1.25 mg/3 mL nebulizer solution Take 3 mLs (1.25 mg total) by nebulization every 8 (eight) hours as needed for Wheezing or Shortness of Breath. Rescue    lipase-protease-amylase (CREON) 36,000-114,000- 180,000 unit CpDR Take 4 capsules by mouth 3 (three) times daily with meals. Take 3 with snacks prn. (Patient taking differently: Take 5 capsules by mouth 3 (three) times daily with meals. Take 3 with snacks prn.)    lisinopril 10 MG tablet Take 10 mg by mouth once daily.    LORazepam (ATIVAN) 1 MG tablet Take 2 mg by mouth every 6 (six) hours as needed for Anxiety.     magnesium oxide (MAG-OX) 400 mg tablet Take 1 tablet (400 mg total) by mouth 2 (two) times daily.    montelukast (SINGULAIR) 10 mg tablet Take 1 tablet (10 mg total) by mouth once daily.    morphine (MS CONTIN) 15 MG 12 hr tablet Take 15 mg by mouth 2 (two) times daily.     multivit,min52-folic-vitK-cQ10 (AQUADEKS) 100-700-10 mcg-mcg-mg Cap cap 1 tab twice daily    mycophenolate (CELLCEPT) 250 mg Cap Take 2 capsules (500 mg total) by mouth 2 (two) times daily.     omeprazole (PRILOSEC) 40 MG capsule TAKE 1 CAPSULE BY MOUTH EVERY MORNING    ondansetron (ZOFRAN) 8 MG tablet TAKE 1 TABLET(8 MG) BY MOUTH EVERY 8 HOURS AS NEEDED FOR NAUSEA    oxycodone (ROXICODONE) 5 MG immediate release tablet Take 10 mg by mouth every 4 (four) hours as needed for Pain.    polyethylene glycol (GLYCOLAX) 17 gram PwPk Take 17 g by mouth 2 (two) times daily.    predniSONE (DELTASONE) 5 MG tablet Take 1 tablet (5 mg total) by mouth once daily.    PROCHAMBER USE AS DIRECTED    promethazine (PHENERGAN) 25 MG tablet TAKE 1 TABLET(25 MG) BY MOUTH EVERY 8 HOURS AS NEEDED    tacrolimus (PROGRAF) 0.5 MG Cap Take 1 capsule (0.5 mg total) by mouth once daily. Take with morning dose. Total daily dose is 3.5 mg every morning and 3 mg every evening.    tacrolimus (PROGRAF) 1 MG Cap Take 3 capsules (3 mg total) by mouth every 12 (twelve) hours.    tiZANidine (ZANAFLEX) 4 MG tablet TAKE 2 TABLETS BY MOUTH EVERY 6 HOURS AS NEEDED    topiramate (TOPAMAX) 25 MG tablet Take 1 tablet (25 mg total) by mouth 2 (two) times daily. (Patient taking differently: Take 50 mg by mouth 2 (two) times daily. )    venlafaxine (EFFEXOR-XR) 75 MG 24 hr capsule Take 1 capsule (75 mg total) by mouth every evening. (Patient taking differently: Take 150 mg by mouth every evening. )     Antibiotics (From admission, onward)    Start     Stop Route Frequency Ordered    11/07/18 0800  piperacillin-tazobactam 4.5 g in sodium chloride 0.9% 100 mL IVPB (ready to mix system)      -- IV Every 12 hours (non-standard times) 11/07/18 0409        Antifungals (From admission, onward)    None        Antivirals (From admission, onward)    None           Immunization History   Administered Date(s) Administered    Cytomegalovirus Immune Globulin 06/13/2014       Family History     Problem Relation (Age of Onset)    Drug abuse Maternal Grandfather    Thrombocytopenia Maternal Grandfather        Social History     Socioeconomic History     Marital status: Single     Spouse name: Not on file    Number of children: Not on file    Years of education: Not on file    Highest education level: Not on file   Social Needs    Financial resource strain: Not on file    Food insecurity - worry: Not on file    Food insecurity - inability: Not on file    Transportation needs - medical: Not on file    Transportation needs - non-medical: Not on file   Occupational History    Not on file   Tobacco Use    Smoking status: Never Smoker    Smokeless tobacco: Never Used   Substance and Sexual Activity    Alcohol use: No     Comment: Has not had an alcoholic drink in more than 2 months.    Drug use: No    Sexual activity: Yes     Partners: Male     Birth control/protection: Implant   Other Topics Concern    Not on file   Social History Narrative    Not on file     Review of Systems   Unable to perform ROS: Mental status change     Objective:     Vital Signs (Most Recent):  Temp: 98.7 °F (37.1 °C) (11/07/18 1100)  Pulse: 78 (11/07/18 1600)  Resp: 17 (11/07/18 1600)  BP: 130/75 (11/07/18 1600)  SpO2: 100 % (11/07/18 1600) Vital Signs (24h Range):  Temp:  [98.2 °F (36.8 °C)-98.7 °F (37.1 °C)] 98.7 °F (37.1 °C)  Pulse:  [] 78  Resp:  [7-42] 17  SpO2:  [95 %-100 %] 100 %  BP: (113-143)/(56-91) 130/75     Weight: 57 kg (125 lb 10.6 oz)  Body mass index is 23.74 kg/m².    Estimated Creatinine Clearance: 14.6 mL/min (A) (based on SCr of 4.3 mg/dL (H)).    Physical Exam   Constitutional:   Generalized muscle twitching   HENT:   Head: Normocephalic and atraumatic.   Eyes: Conjunctivae and EOM are normal. Pupils are equal, round, and reactive to light.   Cardiovascular: Intact distal pulses.   No murmur heard.  tachycardic   Pulmonary/Chest: No stridor. She has no wheezes. She has no rales.   Nasal cannula in place   Abdominal: Soft. She exhibits no distension. There is no tenderness. There is no guarding.   Genitourinary:   Genitourinary Comments: Goldberg catheter  in place draining yellow urine   Musculoskeletal: She exhibits no edema or tenderness.   Femoral line in place   Lymphadenopathy:     She has no cervical adenopathy.   Neurological:   Obtunded  Can respond briefly w/ one word answers   Skin: Skin is warm. No pallor.       Significant Labs:   Blood Culture:   Recent Labs   Lab 05/25/18  1643 11/07/18  0200 11/07/18  0252   LABBLOO No growth after 5 days. No Growth to date No Growth to date     CBC:   Recent Labs   Lab 11/07/18  0847 11/07/18  0850 11/07/18  1408   WBC  --  8.68  --    HGB  --  8.6*  --    HCT 27* 29.6* 23*   PLT  --  105*  --      CMP:   Recent Labs   Lab 11/07/18  0312 11/07/18  0850 11/07/18  1600    142 144  144   K 6.5* 5.3* 4.4  4.4   * 113* 107  107   CO2 9* 17* 29  29   * 204* 207*  207*   BUN 54* 52* 35*  35*   CREATININE 6.1* 6.0* 4.3*  4.3*   CALCIUM 8.0* 7.5* 7.2*  7.2*   PROT 6.0  --   --    ALBUMIN 3.2*  --  2.5*   BILITOT 0.3  --   --    ALKPHOS 166*  --   --    AST 18  --   --    ALT 17  --   --    ANIONGAP 17* 12 8  8   EGFRNONAA 8.6* 8.7* 13.1*  13.1*     Lactic Acid:   Recent Labs   Lab 11/07/18  0246   LACTATE 0.5     POCT Glucose:   Recent Labs   Lab 11/07/18  0539 11/07/18  0814 11/07/18  1601   POCTGLUCOSE 225* 300* 244*     Urine Culture: No results for input(s): LABURIN in the last 4320 hours.  Urine Studies:   Recent Labs   Lab 11/07/18  0252   COLORU Yellow   APPEARANCEUA Cloudy*   PHUR 5.0   SPECGRAV 1.010   PROTEINUA 2+*   GLUCUA Negative   KETONESU Trace*   BILIRUBINUA Negative   OCCULTUA 3+*   NITRITE Negative   LEUKOCYTESUR Negative   RBCUA 6*   WBCUA 22*   BACTERIA Rare   HYALINECASTS 0       Significant Imaging: I have reviewed all pertinent imaging results/findings within the past 24 hours.

## 2018-11-07 NOTE — PLAN OF CARE
Problem: Patient Care Overview  Goal: Plan of Care Review  Outcome: Ongoing (interventions implemented as appropriate)  No s/s of distress today. Improving level of consciousness noted today, with pt following simple commands. Narcan IV administered at 0.4 mg/hr per orders, and subsequently discontinued per orders this afternoon.  Nephrology service presents to bedside to assess pt. CRRT initiated per orders, and Sodium Bicarbonate IV infusion discontinued per orders after initiation of CRRT.  Infectious Diseases service presents to bedside, and IV antibiotics administered per orders (with premedication with Benadryl IV prior to administration of Vancomycin IV as per orders).  Dr. Morin and lung transplant medicine team present to bedside frequently to assess pt and aware of all patient data.  Plan of care reviewed with pt and pt's family, and pt's family verbalizes understanding (but reinforcement of education required).  Emotional support offered.

## 2018-11-07 NOTE — CONSULTS
[facility]  Nephrology  Consult Note    Patient Name: Juanita Ibarra  MRN: [id number]  Admission Date: 11/7/2018  Hospital Length of Stay: 0 days  Attending Provider: Francisca Morin DO   Primary Care Physician: Jake Alvarez MD  Principal Problem:<principal problem not specified>    Consults  Subjective:     HPI: The patient is a  28 year-old  female with a medical history of BLT secondary to CF. 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 Patient presents as a transfer from [facility] in [place] on 11/7 after presenting there with a 3 day history of lethargy and dizziness per spouse. The patient has a history of multiple hospitalizations 2/2 PNA and/or dyspnea over the last couple of months. The patient was somnolent during assessment, therefore, majority of HPI is from her spouse and records. The spouse states that the patient had been feeling nauseated for the last 3 days. He states that she also stopped eating during that time. Despite her decreased appetite, the spouse continued to give her all of her prescribed medications medications including her Morphine as normal. On admit to the OSH, her intial BP was 83/52, , and SPO2 was 95% on ?room air. According to the records, she also had a ?LISBETH and was hyperkalemic. She was given calcium gluconate, insulin, D50, and Kayexalate at the OSH. The patient was also given a total of 4 L of ?IVFs for her BP and was started on Zosyn. She was then transferred to [facility] via flight care team overnight for an higher level of care. She was started on a bicarb gtt 2/2 severe metabolic acidosis and narcan drip 2/2 hypercapnic respiratory failure. ABG on admit was 6.9/59 and her bicarb was 12. ABG has improved to 7.2/48.6 and bicarb to 21.8 since being on bicarb drip.  "Patient on 2 L via NC.  The patient was hyperkalemic, 6.5, on admit, and was shifted. Potasium 5.1 after medications. Nephrology consulted for LISBETH and severe metabolic acidosis. The patient's spouse denies a history of kidney issues. Her baseline SCr from 2015 to 2018 has been around ~ 1.0. Her last appointment visits in October revealed a SCr of 1.5 and 1.8. The patient's  did state that a couple of months ago the patient was found to have a LISBETH during a outpatient appointment in [place]. He states that she was sent to ED and was given fluids and discharged home. He denied any issues around that time leading to her LISBETH and states it was "randomly" found on labs. The patient was admitted with a SCr of 6.1. Urine output has been between 10-30 ml/hr. She put out a total of 300 ml during night shift.     Nephrology consulted for management of LISBETH and metabolic acidosis.         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       Past Surgical History:   Procedure Laterality Date    ABDOMINAL SURGERY      peg tube     AFOENKYO-[id number]-MCTVNQLTXVZE N/A 5/19/2015    Performed by Deny Dias Jr., [facility] at [facility] OR    BIOPSY-BRONCHUS N/A 12/20/2016    Performed by Jake Alvarez MD at [facility] OR [address] [place] [address]    BRONCHOSCOPY N/A 5/29/2018    Procedure: BRONCHOSCOPY; Bronchoscopy with BAL and transbronchial biopsies under general anesthesia;  Surgeon: Jake Alvarez MD;  Location: [facility] OR [address] [place] [address];  Service: Transplant;  Laterality: N/A;    BRONCHOSCOPY N/A 10/1/2018    Procedure: BRONCHOSCOPY;  Surgeon: Jake Alvarez" MD;  [facility];  Service: Transplant;  Laterality: N/A;    BRONCHOSCOPY N/A 10/1/2018    Performed by Jake Alvarez MD at [facility] OR 2ND [facility]LR    BRONCHOSCOPY Bilateral 12/20/2016    Performed by Jake Alvarez MD at [facility] OR [facility]ND [facility]    BRONCHOSCOPY - flexible bronchoscopy with probable tissue biopsy CPT 65608 N/A 7/21/2015    Performed by Jake Alvarez MD at [facility] OR 2ND [facility]    BRONCHOSCOPY - flexible bronchoscopy with probable tissue biospy CPT 68256 N/A 10/20/2015    Performed by Jake Alvarez MD at [facility] OR 2ND [facility]    BRONCHOSCOPY - flexible bronchoscopy with tissue biopsy CPT 19326 N/A 9/29/2015    Performed by Jake Alvarez MD at [facility] OR [facility]ND [facility]    BRONCHOSCOPY - flexible bronchoscopy with tissue biopsy CPT 61930 N/A 5/26/2015    Performed by Jake Alvarez MD at [facility] OR 1ST [facility]LR    BRONCHOSCOPY flexible bronchoscopy with tissue biopsy N/A 9/8/2014    Performed by Jake Alvarez MD at [facility] OR [facility]    BRONCHOSCOPY flexible with possible tissue biopsy N/A 8/8/2014    Performed by Jake Alvarez MD at [facility] OR 2ND [facility]    BRONCHOSCOPY, BAL, BIOPSIES N/A 3/20/2018    Performed by Jake Alvarez MD at [facility] OR 2ND [facility]    BRONCHOSCOPY-FIBEROPTIC N/A 1/29/2016    Performed by Joann Cifuentes DO at [facility] OR 2ND [facility]LR    BRONCHOSCOPY; Bronchoscopy with BAL and transbronchial biopsies under general anesthesia N/A 5/29/2018    Performed by Jake Alvarez MD at [facility] OR 2ND [facility]    CHOLECYSTECTOMY  2016    CHOLECYSTECTOMY-LAPAROSCOPIC N/A 7/22/2016    Performed by Joshua Goldberg, MD at [facility] OR 2ND [facility]LR    ENDOMETRIAL ABLATION  2015    KJB    FESS      FESS Bilateral 10/21/2013    Performed by Jared Whatley MD at [facility] OR [facility]    FINE NEEDLE ASPIRATION (FNA)  of a cervical lymphadenopathy  1/29/2016    Performed by Joann Cifuentes DO at [facility] OR 2ND FLR    flex bronchoscopy with probable tissue biopsy N/A 7/31/2014    Performed by [facility] at [facility] OR [address] [place] [address]    flexible bronchoscopy with tissue biopsy N/A 12/11/2014    Performed by Jake Alvarez MD at [facility] OR [address] [place] [address]    INJECTION-VOCAL CORD Left 6/24/2014    Performed by Jared Whatley MD at [facility] OR [address] [place] [address]    NBOOQAQBS-BTRJ-P-CATH to right chest and removal of portacath to left chests wall. Bilateral 6/24/2014    Performed by Zhen Dorado MD at [facility] OR [address] [place] [address]    LARYNX SURGERY  2016    LUNG TRANSPLANT Bilateral 6/12/14    MYRINGOTOMY W/ TUBES Right 04/2017    MYRINGOTOMY WITH INSERTION OF PE TUBES Right 4/15/2017    Performed by Gary Null MD at [facility] OR [address] [place] [address]    PLACEMENT-TUBE-PEG  5/15/2014    Performed by David Moses MD at [facility] OR [address] [place] [address]    PORTACATH PLACEMENT Right     rt sc    REMOVAL-TUBE-PEG  5/15/2014    Performed by David Moses MD at [facility] OR [address] [place] [address]    ROBOTIC ASSISTED LAPAROSCOPIC HYSTERECTOMY N/A 4/25/2017    Performed by Deny Dias Jr., MD at [facility] [place] [place]    SALPINGECTOMY Bilateral 2015    KJB    SALPINGECTOMY-LAPAROSCOPIC Bilateral 5/19/2015    Performed by Deny Dias Jr., MD at [facility] [place] [place]    SINUS SURGERY FUNCTIONAL ENDOSCOPIC WITH NAVIGATION Bilateral 4/3/2017    Performed by Cesar Olsen MD at [facility] OR [address] [place] [address]    SINUS SURGERY FUNCTIONAL ENDOSCOPIC WITH NAVIGATION, 52807, 58733, 99219, 72446 Bilateral 2/5/2015    Performed by Cesar Olsen MD at [facility] OR [address] [place] [address]    THYROPLASTY - Medialization laryngoplasty, cricothyroid subluxation, arytenoid repositioning Left 8/2/2016    Performed by Gary Null MD at [facility] OR [address] [place] [address]    TRANSPLANT-LUNG Bilateral 6/11/2014    Performed by Adolfo Huston MD at [facility] OR [address] [place] [address]       Review of patient's allergies indicates:   Allergen Reactions    Albuterol Palpitations    Colistin Anaphylaxis    Vancomycin analogues      Infusion reaction that does not resolve with slowing    Neupogen [filgrastim] Other (See Comments)     Ostealgia after five daily doses of 300  mcg.      Bactrim [sulfamethoxazole-trimethoprim] Hives    Ceftazidime Hives     Pt stated can tolerate cefapine not ceftazidime    Ceftazidime     Dronabinol Other (See Comments)     Mental changes/hallucinations    Haldol [haloperidol lactate] Other (See Comments)     Seizure like activity    Nsaids (non-steroidal anti-inflammatory drug)      Cannot have due to lung transplant    Adhesive Rash     Cloth tape- please use tegaderm or paper tape    Aztreonam Rash    Ciprofloxacin Nausea And Vomiting     Projectile N/V, per patient.  Unwilling to retry therapy.     Current Facility-Administered Medications   Medication Frequency    0.9%  NaCl infusion (CRRT USE ONLY) Continuous    acetaminophen tablet 1,000 mg Q6H PRN    fluticasone-vilanterol 100-25 mcg/dose diskus inhaler 1 puff Daily    levalbuterol nebulizer solution 1.25 mg Q8H PRN    magnesium sulfate 2g in water 50mL IVPB (premix) PRN    naloxone (NARCAN) 2 mg in dextrose 5 % 100 mL infusion Continuous    naloxone 0.4 mg/mL injection 0.4 mg PRN    ondansetron injection 8 mg Q6H PRN    pantoprazole injection 40 mg Daily    piperacillin-tazobactam 4.5 g in sodium chloride 0.9% 100 mL IVPB (ready to mix system) Q12H    potassium phosphate 20 mmol in dextrose 5 % 500 mL infusion PRN    sodium bicarbonate 1 mEq/mL (8.4 %) 150 mEq in dextrose 5 % 1,000 mL infusion Continuous    tiZANidine tablet 8 mg Q6H PRN     Family History     Problem Relation (Age of Onset)    Drug abuse Maternal Grandfather    Thrombocytopenia Maternal Grandfather        Tobacco Use    Smoking status: Never Smoker    Smokeless tobacco: Never Used   Substance and Sexual Activity    Alcohol use: No     Comment: Has not had an alcoholic drink in more than 2 months.    Drug use: No    Sexual activity: Yes     Partners: Male     Birth control/protection: Implant     Review of Systems   Unable to perform ROS: Acuity of condition     Objective:     Vital Signs (Most  Recent):  Temp: 98.7 °F (37.1 °C) (11/07/18 1100)  Pulse: 108 (11/07/18 1400)  Resp: (!) 27 (11/07/18 1400)  BP: 113/86 (11/07/18 1400)  SpO2: 99 % (11/07/18 1400)  O2 Device (Oxygen Therapy): nasal cannula (11/07/18 1223) Vital Signs (24h Range):  Temp:  [98.2 °F (36.8 °C)-98.7 °F (37.1 °C)] 98.7 °F (37.1 °C)  Pulse:  [101-137] 108  Resp:  [7-40] 27  SpO2:  [96 %-100 %] 99 %  BP: (113-143)/(56-91) 113/86     Weight: 57 kg (125 lb 10.6 oz) (11/07/18 0430)  Body mass index is 23.74 kg/m².  Body surface area is 1.57 meters squared.    I/O last 3 completed shifts:  In: 81.3 [I.V.:81.3]  Out: 300 [Urine:300]    Physical Exam   Constitutional: She appears well-developed. She is uncooperative. She has a sickly appearance. She appears ill. No distress. Nasal cannula in place.   Alert at times, but not following direct commands.    HENT:   Head: Normocephalic and atraumatic.   Mouth/Throat: No oropharyngeal exudate.   Eyes: Right eye exhibits no discharge. Left eye exhibits no discharge.   Neck: Normal range of motion. Neck supple. No JVD present.   Cardiovascular: Normal rate, regular rhythm, normal heart sounds and intact distal pulses.   No murmur heard.  Pulmonary/Chest: Effort normal and breath sounds normal. She has no wheezes. She has no rhonchi. She has no rales.   Abdominal: Soft. Bowel sounds are normal. She exhibits no distension. There is no tenderness. There is no guarding.   Musculoskeletal: Normal range of motion. She exhibits no edema.   Neurological: No cranial nerve deficit.   Somnolent during assessment    Skin: Skin is warm and dry. No rash noted. She is not diaphoretic. No erythema. There is pallor.   Psychiatric: Her speech is normal. Her mood appears anxious. She exhibits a depressed mood.   Nursing note and vitals reviewed.      Significant Labs:  ABGs:   Recent Labs   Lab 11/07/18  1408   PH 7.260*   PCO2 48.6*   HCO3 21.8*   POCSATURATED 99   BE -5     CBC:   Recent Labs   Lab 11/07/18  0850  11/07/18  1408   WBC 8.68  --    RBC 3.19*  --    HGB 8.6*  --    HCT 29.6* 23*   *  --    MCV 93  --    MCH 27.0  --    MCHC 29.1*  --      CMP:   Recent Labs   Lab 11/07/18  0312 11/07/18  0850   * 204*   CALCIUM 8.0* 7.5*   ALBUMIN 3.2*  --    PROT 6.0  --     142   K 6.5* 5.3*   CO2 9* 17*   * 113*   BUN 54* 52*   CREATININE 6.1* 6.0*   ALKPHOS 166*  --    ALT 17  --    AST 18  --    BILITOT 0.3  --          Assessment/Plan:     Acute kidney failure    The patient is a  28 year-old  female with medical history of BLT secondary to CF. 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 Patient presents as a transfer from [facility] in [place] on 11/7 after presenting there with a 3 day history of lethargy and dizziness per spouse. She was transferred to [facility] via flight care team overnight. She was started on a bicarb gtt 2/2 severe metabolic acidosis and narcan drip 2/2 hypercapnic respiratory failure. ABG on admit was 6.9/59 and her bicarb was 12. ABG improved to 7.2/48.6 and bicarb to 21.8 since being on bicarb drip. Patient on 2 L via NC. The patient was hyperkalemic, 6.5, on admit, and was shifted. Potasium 5.1 after medications. Nephrology consulted for LISBETH and severe metabolic acidosis. The patient's spouse denies a history of kidney issues. Her baseline SCr from 2015 to 2018 has been around ~ 1.0. Her appointment visits in October revealed a SCr of 1.5 and 1.8. The patient was admitted with a SCr of 6.1. Urine outputt has been between 10-30 ml/hr. She put out a total of 300 ml during night shift. UA today with 2+ protein. UPCR pending. LISBETH secondary to ATN likely caused by hypotension/sepsis in the setting of severe metabolic acidosis. Narcan drip previously started for possible opioid overdose likely 2/2 home oral Morphine  use and her undiagnosed acute renal failure at home. Patient wean off of drip thus far secondary to improved mentation.     Will plan for SLED for clearance secondary to persistent acidosis. No UF.   Maintain urinary catheter for strict I/Os.   Will continue trending SCr, electrolytes and acid/base status, and volume status.   Will order renal US  Will order urine creatinine/protein ratio  Avoid NSAIDs, contrast, nephrotoxins   Monitor strict I/Os, daily weights  Renally dose medications to current GFR  Will discuss with staff              Thank you for your consult. I will follow-up with patient. Please contact us if you have any additional questions.    Rema Lunsford NP  Nephrology  [facility]  [phone]

## 2018-11-07 NOTE — PROGRESS NOTES
Pt admitted to SICU [id number] via Flight Care team. Pt connected to SICU monitors and assessed. Pt on 2L O2 nasal cannula, O2 sats 96%. Gtts infusing: bicarb. Pt follows commands, moves all extremities. Pt Disorientedx4. No skin breakdown to sacrum or heels. Dr. Raya to bedside, orders implemented as appropriate

## 2018-11-07 NOTE — PROCEDURES
[facility]  Central Venous Catheter  Procedure Note    SUMMARY     Date of Procedure: 11/7/2018     Procedure: Insertion of Central Venous Catheter    Provider: Shama Canales PA-C    Assisting Provider: Francisca Morin DO    Indications: vascular access, acute renal failure requiring dialysis     Pre-Procedure Diagnosis: Acute renal failure, sepsis    Post-Procedure Diagnosis: Acute renal failure, sepsis    Anesthesia: Local, 3 cc Lidocaine 1%     Technical Procedures Used: Seldinger Technique    Description of the Findings of the Procedure:    Informed consent was obtained for the procedure, including sedation.  Risks of lung perforation, hemorrhage, arrhythmia, and adverse drug reaction were discussed.     Maximum sterile technique was used including antiseptics, cap, gloves, gown, hand hygiene, mask and sheet.    Under sterile conditions the skin above the on the right femoral vein was prepped with betadine and covered with a sterile drape. Local anesthesia was applied to the skin and subcutaneous tissues. A 22-gauge needle was used to identify the vein. An 18-gauge needle was then inserted into the vein. A guide wire was then passed easily through the catheter. There were no arrhythmias. The catheter was then withdrawn. A 8.5 Wolof triple-lumen was then inserted into the vessel over the guide wire. The catheter was sutured into place.    There were no changes to vital signs. Catheter was flushed with 30 cc NS. Patient did tolerate procedure well.    Recommendations:    CXR ordered to verify placement.    Significant Surgical Tasks Conducted by the Assistant(s), if Applicable: Guidewire was visualized within the right femoral vein.     Complications: No    Estimated Blood Loss (EBL): None    Implants: None    Specimens: None    Condition: Serious    Disposition: ICU - extubated and stable.    Attestation: I performed the procedure.

## 2018-11-07 NOTE — CONSULTS
Consult received and reviewed. Full note to follow.     GUSTABO Lunsford DNP, APRN, FNP-C  Department of Nephrology  [facility]  Pager: [phone]

## 2018-11-07 NOTE — CONSULTS
[facility]  Infectious Disease  Consult Note    Patient Name: Juanita Ibarra  MRN: [id number]  Admission Date: 11/7/2018  Hospital Length of Stay: 0 days  Attending Physician: Francisca Morin DO  Primary Care Provider: Jake Alvarez MD     Isolation Status: Neutropenic    Patient information was obtained from spouse/SO, past medical records and ER records.      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 Per  beginning Sunday evening patient feeling unwell & w/ poor PO intake. Since that time he states she progressively became more lethargic and  decided to bring her to the ED. On presentation patient hypotensive to 83/52, found to have pH 6.9, LISBETH w/ creatinine to 6.1 w/ electrolyte derangements. She was started on bicarb gtt and Narcan gtt as well as vanc & zosyn. ID consulted for antibiotic management due to concern for sepsis.    Based on history, physical exam, imaging, and lab results patient's currently clinical status likely 2/2 acidosis 2/2 acute renal failure (creatinine on admission 6.1, baseline creatinine .9-1.2) likely 2/2 poor PO intake in conjunction w/ nephrotoxic medications (including lisinopril, cellcept, tracrolimus). Creatinine 1.5 on 10/22 (creatinine possibly progressively worsened since that point). Encephalopathy likely 2/2 severe acidemia, and opiates & benzos (which will take longer to clear 2/2 severe renal failure). Convulsions likely 2/2 benzo withdrawal.  Patient to undergo dialysis today.    Patient clinical status unlikely 2/2 infection, however, given the severity of patient's illness would continue w/ antibiotics for now   Recommend d/c vanc and zosyn (both of which are nephrotoxic) and  switch to cefepime (per  pt has received this medication in the past w/o allergic reaction)  F/u urine cx, blood cx  F/u RVP         Thank you for your consult. I will follow-up with patient. Please contact us if you have any additional questions.    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 Per  beginning Sunday evening patient feeling unwell & w/ poor PO intake. Since that time he states she progressively became more lethargic and  decided to bring her to the ED. On presentation patient hypotensive to 83/52, found to have pH 6.9, LISBETH w/ creatinine to 6.1 w/ electrolyte derangements. She was started on bicarb gtt and Narcan gtt as well as vanc & zosyn. ID consulted for antibiotic management due to concern for sepsis.    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       Past Surgical History:   Procedure Laterality Date    ABDOMINAL SURGERY      peg tube     [id number] N/A 5/19/2015    Performed by Deny Dias Jr., MD at [facility] OR    BIOPSY-BRONCHUS N/A 12/20/2016    Performed by Jake Alvarez MD at [facility] OR 2ND FLR    BRONCHOSCOPY N/A 5/29/2018    Procedure: BRONCHOSCOPY; Bronchoscopy with BAL and transbronchial biopsies  under general anesthesia;  Surgeon: Jake Alvarez MD;  [facility];  Service: Transplant;  Laterality: N/A;    BRONCHOSCOPY N/A 10/1/2018    Procedure: BRONCHOSCOPY;  Surgeon: Jake Alvarez MD;  [facility];  Service: Transplant;  Laterality: N/A;    BRONCHOSCOPY N/A 10/1/2018    Performed by Jake Alvarez MD at [facility] OR 2ND FLR    BRONCHOSCOPY Bilateral 12/20/2016    Performed by Jake Alvarez MD at [facility] OR 2ND FLR    BRONCHOSCOPY - flexible bronchoscopy with probable tissue biopsy CPT 55588 N/A 7/21/2015    Performed by Jake Alvarez MD at [facility] OR 2ND FLR    BRONCHOSCOPY - flexible bronchoscopy with probable tissue biospy CPT 89182 N/A 10/20/2015    Performed by Jake Alvarez MD at [facility] OR 2ND FLR    BRONCHOSCOPY - flexible bronchoscopy with tissue biopsy CPT 40526 N/A 9/29/2015    Performed by Jake Alvarez MD at [facility] OR 2ND FLR    BRONCHOSCOPY - flexible bronchoscopy with tissue biopsy CPT 55554 N/A 5/26/2015    Performed by Jake Alvarez MD at [facility] OR 1ST FLR    BRONCHOSCOPY flexible bronchoscopy with tissue biopsy N/A 9/8/2014    Performed by Jake Alvarez MD at [facility] OR 2ND FLR    BRONCHOSCOPY flexible with possible tissue biopsy N/A 8/8/2014    Performed by Jake Alvarez MD at [facility] OR 2ND FLR    BRONCHOSCOPY, BAL, BIOPSIES N/A 3/20/2018    Performed by Jake Alvarez MD at [facility] OR 2ND FLR    BRONCHOSCOPY-FIBEROPTIC N/A 1/29/2016    Performed by Joann Cifuentes DO at [facility] OR 2ND FLR    BRONCHOSCOPY; Bronchoscopy with BAL and transbronchial biopsies under general anesthesia N/A 5/29/2018    [facility]    CHOLECYSTECTOMY  2016    CHOLECYSTECTOMY-LAPAROSCOPIC N/A 7/22/2016    [facility] Joshua Goldberg[facility]    ENDOMETRIAL ABLATION  2015    KJB    FESS      FESS Bilateral 10/21/2013    [facility]    FINE NEEDLE  ASPIRATION (FNA)  of a cervical lymphadenopathy  1/29/2016    Performed by Joann Cifuentes DO at [facility] OR [address] [place] [address]    flex bronchoscopy with probable tissue biopsy N/A 7/31/2014    Performed by [facility] Diagnostic Provider at [facility] OR [address] [place] [address]    flexible bronchoscopy with tissue biopsy N/A 12/11/2014    Performed by Jake Alvarez MD at [facility] OR [address] [place] [address]    INJECTION-VOCAL CORD Left 6/24/2014    Performed by Jared Whatley MD at [facility] OR [address] [place] [address]    HOVLZPEMW-PKFL-H-CATH to right chest and removal of portacath to left chests wall. Bilateral 6/24/2014    Performed by Zhen Dorado MD at [facility] OR [address] [place] [address]    LARYNX SURGERY  2016    LUNG TRANSPLANT Bilateral 6/12/14    MYRINGOTOMY W/ TUBES Right 04/2017    MYRINGOTOMY WITH INSERTION OF PE TUBES Right 4/15/2017    Performed by Gary Null MD at [facility] OR [address] [place] [address]    PLACEMENT-TUBE-PEG  5/15/2014    Performed by David Moses MD at [facility] OR [address] [place] [address]    PORTACATH PLACEMENT Right     rt sc    REMOVAL-TUBE-PEG  5/15/2014    Performed by David Moses MD at [facility] OR [address] [place] [address]    ROBOTIC ASSISTED LAPAROSCOPIC HYSTERECTOMY N/A 4/25/2017    Performed by Deny Dias Jr., MD at [facility] OR    SALPINGECTOMY Bilateral 2015    KJB    SALPINGECTOMY-LAPAROSCOPIC Bilateral 5/19/2015    Performed by [facility]y Dias Jr., MD at [facility] OR    SINUS SURGERY FUNCTIONAL ENDOSCOPIC WITH NAVIGATION Bilateral 4/3/2017    Performed by Cesar Olsen MD at [facility] OR [address] [place] [address]    SINUS SURGERY FUNCTIONAL ENDOSCOPIC WITH NAVIGATION, 53125, 95085, 95781, 45640 Bilateral 2/5/2015    Performed by Cesar Olsen MD at [facility] OR [address] [place] [address]    THYROPLASTY - Medialization laryngoplasty, cricothyroid subluxation, arytenoid repositioning Left 8/2/2016    Performed by Gary Null MD at [facility] OR [address] [place] [address]    TRANSPLANT-LUNG Bilateral 6/11/2014    Performed by dAolfo Huston MD at [facility] OR [address] [place] [address]       Review of patient's allergies indicates:   Allergen Reactions    Albuterol  Palpitations    Colistin Anaphylaxis    Vancomycin analogues      Infusion reaction that does not resolve with slowing    Neupogen [filgrastim] Other (See Comments)     Ostealgia after five daily doses of 300 mcg.      Bactrim [sulfamethoxazole-trimethoprim] Hives    Ceftazidime Hives     Pt stated can tolerate cefapine not ceftazidime    Ceftazidime     Dronabinol Other (See Comments)     Mental changes/hallucinations    Haldol [haloperidol lactate] Other (See Comments)     Seizure like activity    Nsaids (non-steroidal anti-inflammatory drug)      Cannot have due to lung transplant    Adhesive Rash     Cloth tape- please use tegaderm or paper tape    Aztreonam Rash    Ciprofloxacin Nausea And Vomiting     Projectile N/V, per patient.  Unwilling to retry therapy.       Medications:  Medications Prior to Admission   Medication Sig    aripiprazole (ABILIFY) 2 MG Tab Take 2 mg by mouth once daily.    butalbital-acetaminophen-caffeine -40 mg (FIORICET, ESGIC) -40 mg per tablet TAKE 1 TABLET BY MOUTH EVERY 6 HOURS AS NEEDED FOR HEADACHE    calcium-vitamin D3 500 mg(1,250mg) -200 unit per tablet Take 1 tablet by mouth 2 (two) times daily with meals.    dapsone 100 MG Tab Take 1 tablet (100 mg total) by mouth once daily.    diphenhydrAMINE (BENADRYL) 50 MG tablet Take 1 tablet (50 mg total) by mouth every 6 (six) hours as needed for Itching.    DULERA 100-5 mcg/actuation HFAA INHALE 1 PUFF BY MOUTH TWICE DAILY    fexofenadine (ALLEGRA) 180 MG tablet Take 180 mg by mouth once daily.    fluconazole (DIFLUCAN) 150 MG Tab TAKE 1 TABLET BY MOUTH EVERY WEEK    fluticasone (FLONASE) 50 mcg/actuation nasal spray INSERT 2 SPRAYS IN EACH NOSTRIL DAILY    folic acid (FOLVITE) 1 MG tablet Take 1 tablet (1,000 mcg total) by mouth once daily.    gabapentin (NEURONTIN) 300 MG capsule Take 1 capsule (300 mg total) by mouth 3 (three) times daily. (Patient taking differently: Take 1,100 mg by mouth 3  (three) times daily. )    levalbuterol (XOPENEX) 1.25 mg/3 mL nebulizer solution Take 3 mLs (1.25 mg total) by nebulization every 8 (eight) hours as needed for Wheezing or Shortness of Breath. Rescue    lipase-protease-amylase (CREON) 36,000-114,000- 180,000 unit CpDR Take 4 capsules by mouth 3 (three) times daily with meals. Take 3 with snacks prn. (Patient taking differently: Take 5 capsules by mouth 3 (three) times daily with meals. Take 3 with snacks prn.)    lisinopril 10 MG tablet Take 10 mg by mouth once daily.    LORazepam (ATIVAN) 1 MG tablet Take 2 mg by mouth every 6 (six) hours as needed for Anxiety.     magnesium oxide (MAG-OX) 400 mg tablet Take 1 tablet (400 mg total) by mouth 2 (two) times daily.    montelukast (SINGULAIR) 10 mg tablet Take 1 tablet (10 mg total) by mouth once daily.    morphine (MS CONTIN) 15 MG 12 hr tablet Take 15 mg by mouth 2 (two) times daily.     multivit,min52-folic-vitK-cQ10 (AQUADEKS) 100-700-10 mcg-mcg-mg Cap cap 1 tab twice daily    mycophenolate (CELLCEPT) 250 mg Cap Take 2 capsules (500 mg total) by mouth 2 (two) times daily.    omeprazole (PRILOSEC) 40 MG capsule TAKE 1 CAPSULE BY MOUTH EVERY MORNING    ondansetron (ZOFRAN) 8 MG tablet TAKE 1 TABLET(8 MG) BY MOUTH EVERY 8 HOURS AS NEEDED FOR NAUSEA    oxycodone (ROXICODONE) 5 MG immediate release tablet Take 10 mg by mouth every 4 (four) hours as needed for Pain.    polyethylene glycol (GLYCOLAX) 17 gram PwPk Take 17 g by mouth 2 (two) times daily.    predniSONE (DELTASONE) 5 MG tablet Take 1 tablet (5 mg total) by mouth once daily.    PROCHAMBER USE AS DIRECTED    promethazine (PHENERGAN) 25 MG tablet TAKE 1 TABLET(25 MG) BY MOUTH EVERY 8 HOURS AS NEEDED    tacrolimus (PROGRAF) 0.5 MG Cap Take 1 capsule (0.5 mg total) by mouth once daily. Take with morning dose. Total daily dose is 3.5 mg every morning and 3 mg every evening.    tacrolimus (PROGRAF) 1 MG Cap Take 3 capsules (3 mg total) by mouth  every 12 (twelve) hours.    tiZANidine (ZANAFLEX) 4 MG tablet TAKE 2 TABLETS BY MOUTH EVERY 6 HOURS AS NEEDED    topiramate (TOPAMAX) 25 MG tablet Take 1 tablet (25 mg total) by mouth 2 (two) times daily. (Patient taking differently: Take 50 mg by mouth 2 (two) times daily. )    venlafaxine (EFFEXOR-XR) 75 MG 24 hr capsule Take 1 capsule (75 mg total) by mouth every evening. (Patient taking differently: Take 150 mg by mouth every evening. )     Antibiotics (From admission, onward)    Start     Stop Route Frequency Ordered    11/07/18 0800  piperacillin-tazobactam 4.5 g in sodium chloride 0.9% 100 mL IVPB (ready to mix system)      -- IV Every 12 hours (non-standard times) 11/07/18 0409        Antifungals (From admission, onward)    None        Antivirals (From admission, onward)    None           Immunization History   Administered Date(s) Administered    Cytomegalovirus Immune Globulin 06/13/2014       Family History     Problem Relation (Age of Onset)    Drug abuse Maternal Grandfather    Thrombocytopenia Maternal Grandfather        Social History     Socioeconomic History    Marital status: Single     Spouse name: Not on file    Number of children: Not on file    Years of education: Not on file    Highest education level: Not on file   Social Needs    Financial resource strain: Not on file    Food insecurity - worry: Not on file    Food insecurity - inability: Not on file    Transportation needs - medical: Not on file    Transportation needs - non-medical: Not on file   Occupational History    Not on file   Tobacco Use    Smoking status: Never Smoker    Smokeless tobacco: Never Used   Substance and Sexual Activity    Alcohol use: No     Comment: Has not had an alcoholic drink in more than 2 months.    Drug use: No    Sexual activity: Yes     Partners: Male     Birth control/protection: Implant   Other Topics Concern    Not on file   Social History Narrative    Not on file     Review of  Systems   Unable to perform ROS: Mental status change     Objective:     Vital Signs (Most Recent):  Temp: 98.7 °F (37.1 °C) (11/07/18 1100)  Pulse: 78 (11/07/18 1600)  Resp: 17 (11/07/18 1600)  BP: 130/75 (11/07/18 1600)  SpO2: 100 % (11/07/18 1600) Vital Signs (24h Range):  Temp:  [98.2 °F (36.8 °C)-98.7 °F (37.1 °C)] 98.7 °F (37.1 °C)  Pulse:  [] 78  Resp:  [7-42] 17  SpO2:  [95 %-100 %] 100 %  BP: (113-143)/(56-91) 130/75     Weight: 57 kg (125 lb 10.6 oz)  Body mass index is 23.74 kg/m².    Estimated Creatinine Clearance: 14.6 mL/min (A) (based on SCr of 4.3 mg/dL (H)).    Physical Exam   Constitutional:   Generalized muscle twitching   HENT:   Head: Normocephalic and atraumatic.   Eyes: Conjunctivae and EOM are normal. Pupils are equal, round, and reactive to light.   Cardiovascular: Intact distal pulses.   No murmur heard.  tachycardic   Pulmonary/Chest: No stridor. She has no wheezes. She has no rales.   Nasal cannula in place   Abdominal: Soft. She exhibits no distension. There is no tenderness. There is no guarding.   Genitourinary:   Genitourinary Comments: Goldberg catheter in place draining yellow urine   Musculoskeletal: She exhibits no edema or tenderness.   Femoral line in place   Lymphadenopathy:     She has no cervical adenopathy.   Neurological:   Obtunded  Can respond briefly w/ one word answers   Skin: Skin is warm. No pallor.       Significant Labs:   Blood Culture:   Recent Labs   Lab 05/25/18  1643 11/07/18  0200 11/07/18  0252   LABBLOO No growth after 5 days. No Growth to date No Growth to date     CBC:   Recent Labs   Lab 11/07/18  0847 11/07/18  0850 11/07/18  1408   WBC  --  8.68  --    HGB  --  8.6*  --    HCT 27* 29.6* 23*   PLT  --  105*  --      CMP:   Recent Labs   Lab 11/07/18  0312 11/07/18  0850 11/07/18  1600    142 144  144   K 6.5* 5.3* 4.4  4.4   * 113* 107  107   CO2 9* 17* 29  29   * 204* 207*  207*   BUN 54* 52* 35*  35*   CREATININE 6.1* 6.0*  4.3*  4.3*   CALCIUM 8.0* 7.5* 7.2*  7.2*   PROT 6.0  --   --    ALBUMIN 3.2*  --  2.5*   BILITOT 0.3  --   --    ALKPHOS 166*  --   --    AST 18  --   --    ALT 17  --   --    ANIONGAP 17* 12 8  8   EGFRNONAA 8.6* 8.7* 13.1*  13.1*     Lactic Acid:   Recent Labs   Lab 11/07/18  0246   LACTATE 0.5     POCT Glucose:   Recent Labs   Lab 11/07/18  0539 11/07/18  0814 11/07/18  1601   POCTGLUCOSE 225* 300* 244*     Urine Culture: No results for input(s): LABURIN in the last 4320 hours.  Urine Studies:   Recent Labs   Lab 11/07/18  0252   COLORU Yellow   APPEARANCEUA Cloudy*   PHUR 5.0   SPECGRAV 1.010   PROTEINUA 2+*   GLUCUA Negative   KETONESU Trace*   BILIRUBINUA Negative   OCCULTUA 3+*   NITRITE Negative   LEUKOCYTESUR Negative   RBCUA 6*   WBCUA 22*   BACTERIA Rare   HYALINECASTS 0       Significant Imaging: I have reviewed all pertinent imaging results/findings within the past 24 hours.

## 2018-11-07 NOTE — PLAN OF CARE
Problem: Patient Care Overview  Goal: Plan of Care Review  Plan of care reviewed with pt and spouse. Pt difficult to arouse with low respiratory rate. Admin narcan x2 per MD order. Pt continues to be somnolent shortly after doses. PT on NC 2L. Bicarb gtt at 125/hr per MD order. Pt UO this shift minimal. No new skin breakdown noted. Will continue to monitor.

## 2018-11-07 NOTE — H&P
"  [facility]  Initial Consult Note      Primary Attending:  Francisca Morin DO   Consultant Fellow: Jake Raya MD       Chief Complaint/Reason for Consult      Weakness, confusion, acute kidney injury     History of Present Illness      History provided from chart review and patient's      Mrs. 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 Patient presents as a transfer from [facility] in [place].    Presented to ED on 11/6/18 with initial complaints of feeling "really dizzy" and generally feeling unwell. Woke up that morning feeling "disoriented". Noted by ED nurse that patient hadn't eaten in about 24 hours. Intial BP was 83/52,  SPO2 95% Initial GCS noted as 15.      While in their ED:    Rec'd IVF (4 L)  Calcium gluconate  Insulin and D50 given  Kayexalate 30 gm  Zosyn 2.25    BP responded to IVF boluses, HR remained elevated in their ED but improved,    She refused to receive vancomycin and benadryl     Per  and transport crew, patient became increasing somnolent while at the OSH. Nearly unresponsive on arrival to [facility]. Further history obtained from  reveals about 48 hours of nausea and anorexia preceding hospital visit yesterday. She did take 0800 meds yesterday including her MS Contin.        Past Medical History      Medical:  has a past medical history of Arthritis, Asthma, Blood transfusion, Bronchiolitis obliterans syndrome, Cystic fibrosis of the lung, Ear infection, Hypertension, MRSA (methicillin resistant Staphylococcus aureus) carrier, Osteopenia, Other specified disease of pancreas, Pain disorder, Seizures, Sinusitis, chronic, and Vocal cord paralysis.    Surgical:  has a past surgical history that includes Portacath placement " (Right); Lung transplant (Bilateral, 6/12/14); Abdominal surgery; Endometrial ablation (2015); Cholecystectomy (2016); Salpingectomy (Bilateral, 2015); Larynx surgery (2016); FESS; Myringotomy w/ tubes (Right, 04/2017); BRONCHOSCOPY (N/A, 10/1/2018); BRONCHOSCOPY; Bronchoscopy with BAL and transbronchial biopsies under general anesthesia (N/A, 5/29/2018); BRONCHOSCOPY, BAL, BIOPSIES (N/A, 3/20/2018); ROBOTIC ASSISTED LAPAROSCOPIC HYSTERECTOMY (N/A, 4/25/2017); MYRINGOTOMY WITH INSERTION OF PE TUBES (Right, 4/15/2017); SINUS SURGERY FUNCTIONAL ENDOSCOPIC WITH NAVIGATION (Bilateral, 4/3/2017); BRONCHOSCOPY (Bilateral, 12/20/2016); BIOPSY-BRONCHUS (N/A, 12/20/2016); THYROPLASTY - Medialization laryngoplasty, cricothyroid subluxation, arytenoid repositioning (Left, 8/2/2016); CHOLECYSTECTOMY-LAPAROSCOPIC (N/A, 7/22/2016); BRONCHOSCOPY-FIBEROPTIC (N/A, 1/29/2016); FINE NEEDLE ASPIRATION (FNA)  of a cervical lymphadenopathy (1/29/2016); BRONCHOSCOPY - flexible bronchoscopy with probable tissue biospy CPT 16537 (N/A, 10/20/2015); BRONCHOSCOPY - flexible bronchoscopy with tissue biopsy CPT 81023 (N/A, 9/29/2015); BRONCHOSCOPY - flexible bronchoscopy with probable tissue biopsy CPT 18788 (N/A, 7/21/2015); BRONCHOSCOPY - flexible bronchoscopy with tissue biopsy CPT 31212 (N/A, 5/26/2015); SALPINGECTOMY-LAPAROSCOPIC (Bilateral, 5/19/2015); [id number] (N/A, 5/19/2015); SINUS SURGERY FUNCTIONAL ENDOSCOPIC WITH NAVIGATION, 05266, 17319, 56600, 80002 (Bilateral, 2/5/2015); flexible bronchoscopy with tissue biopsy (N/A, 12/11/2014); BRONCHOSCOPY flexible bronchoscopy with tissue biopsy (N/A, 9/8/2014); BRONCHOSCOPY flexible with possible tissue biopsy (N/A, 8/8/2014); flex bronchoscopy with probable tissue biopsy (N/A, 7/31/2014); INJECTION-VOCAL CORD (Left, 6/24/2014); [id number]-B-CATH to right chest and removal of portacath to left chests wall. (Bilateral, 6/24/2014); TRANSPLANT-LUNG (Bilateral,  6/11/2014); REMOVAL-TUBE-PEG (5/15/2014); PLACEMENT-TUBE-PEG (5/15/2014); and FESS (Bilateral, 10/21/2013).    Family: family history includes Drug abuse in her maternal grandfather; Thrombocytopenia in her maternal grandfather.    Social:  reports that  has never smoked. she has never used smokeless tobacco. She reports that she does not drink alcohol or use drugs.    Allergies and Medications reviewed     Review of Systems        · A complete review of systems was not obtainable due to the current medical condition of the patient.       On Examination     Vital Signs   Temp:  [97.6 °F (36.4 °C)-98.3 °F (36.8 °C)]   Pulse:  [108-124]   Resp:  [23-38]   BP: ()/(58-82)   SpO2:  [95 %-100 %]    Physical Exam   GENERAL: The patient is obtunded in no apparent distress. She will briefly awaken with sternal rub, but quickly falls back asleep.     HEENT: Pupils are equally round and briskly reactive to light. Extraocular muscles are grossly intact. Oral mucous membranes are moist without lesions. Round facies    NECK: The patient has no noted JVD. No adenopathy is appreciated.    CHEST/LUNGS:her lungs are clear bilaterally without rhonchi, rales, or wheezes. There is no subcutaneous air appreciated. There is no tenderness to the chest wall. Respiratory rate 6    HEART: The patient has a regular rate and rhythm. No murmurs, rubs, or gallops are appreciated. Distal pulses are 2+. Extremities are cool and pale. No cyanosis.    ABDOMEN: The patients abdomen is completely soft, nontender, and nondistended. Bowel sounds are present. No organomegaly is appreciated. No masses are appreciated. There are no peritoneal signs.    EXTREMITIES: The patient has no peripheral edema. There is no focal long bone tenderness or deformity.    SKIN: The patients skin is cool and dry, she has a erythematous rash on both cheeks    PSYCHIATRIC: The patient is obtunded. Briefly awakens to sternal rub.    NEUROLOGIC: Twitching noted in upper  and lower extremities. Minimally responsive, only to painful stimuli       All recent labs and imaging studies have been reviewed    Pertinent findings include:    Initial labs from ED     K 6.4  Cl 110  CO2 12   BUN 51  Creatinine 7.2  AG 19  Alk Phos 160    WBC 12.6  Hgb 10.2  Neutrophil 79    Lactate wnl    I have personally and independently interpretted the following tests:    VBG 6.922/59/65 acute hypercapnic respiratory failure with concurrent metabolic acidosis     CXR  Poor inspiratory effort, no major difference from previous film. No acute infiltrate or effusion.     Assessment and Plan / Recommendations     [facility]    Active Hospital Problems    Diagnosis    Acute kidney failure - Follow up BMP pending. Apparently improving quickly at OSH with 4L fluids. Will follow up BMP, monitor UOP. No further fluids for now, but reassess in AM. UA ordered.       Opioid overdose, acute hypercapnic respiratory failure - Likely unintentional related to use of chronic delayed release morphine + acute renal failure. Reversed with narcan 0.4 x 1. May need additional dosing will monitor closely.       Sepsis - no clear source, UA, blood cultures. Given zosyn at OSH. Refused vanc at OSH. Previous MRSA. Will reorder zosyn, and discuss vanc again with patient. PCT pending. Repeat WBC pending. Fungal serology pending. Viral panel pending.       Steroid-induced hyperglycemia - Glucose normal right now, will monitor q6h today       Current chronic use of systemic steroids - Continue home prednisone      Hyperkalemia, diminished renal excretion - Received insulin and D50 at OSH, repeat BMP pending.       Lung transplant status, bilateral - Immunosuppression and prophylactic dapsone. Unclear of date for fluconazole so will leave for AM.       Immunosuppression - Tacro level ordered. Home dose tac and MMF ordered. Home dose prednisone ordered.       CF related Pancreatic insufficiency - Will discuss with  pharmacy in AM re: dosing of creon she has on her home med list      Chronic pain with opiate use - Holding opioids, holding neurotin for today given renal failure, reordered zanaflex.       Nausea - Zofran PRN     Heparin SQ, ROCK and SCD  PPI which is home med.     Jake Raya MD  [facility] Pulmonary and Critical Care Fellow  Pager: [phone]  Cell: [phone]    11/07/2018  3:07 AM

## 2018-11-07 NOTE — HPI
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 Patient presents as a transfer from [facility] in [place] on 11/7 after presenting there with a 3 day history of lethargy and dizziness per spouse. The patient has a history of multiple hospitalizations 2/2 PNA and/or dyspnea over the last couple of months. The patient was somnolent during assessment, therefore, majority of HPI is from her spouse and records. The spouse states that the patient had been feeling nauseated for the last 3 days. He states that she also stopped eating during that time. Despite her decreased appetite, the spouse continued to give her all of her prescribed medications medications including her Morphine as normal. On admit to the OSH, her intial BP was 83/52, , and SPO2 was 95% on ?room air. According to the records, she also had a ?LISBETH and was hyperkalemic. She was given calcium gluconate, insulin, D50, and Kayexalate at the OSH. The patient was also given a total of 4 L of ?IVFs for her BP and was started on Zosyn. She was then transferred to [facility] via flight care team overnight for an higher level of care. She was started on a bicarb gtt 2/2 severe metabolic acidosis and narcan drip 2/2 hypercapnic respiratory failure. ABG on admit was 6.9/59 and her bicarb was 12. ABG has improved to 7.2/48.6 and bicarb to 21.8 since being on bicarb drip. Patient on 2 L via NC. The patient was hyperkalemic, 6.5, on admit, and was shifted. Potasium 5.1 after medications. Nephrology consulted for LISBETH and severe metabolic acidosis. The patient's spouse denies a history of kidney issues. Her baseline SCr from 2015 to 2018 has been around ~ 1.0. Her last appointment visits in October revealed a SCr of 1.5 and 1.8.  "The patient's  did state that a couple of months ago the patient was found to have a LISBETH during a outpatient appointment in [place]. He states that she was sent to ED and was given fluids and discharged home. He denied any issues around that time leading to her LISBETH and states it was "randomly" found on labs. The patient was admitted with a SCr of 6.1. Urine output has been between 10-30 ml/hr. She put out a total of 300 ml during night shift.     Nephrology consulted for management of LISBETH and metabolic acidosis.       "

## 2018-11-07 NOTE — CONSULTS
Consult received. Full note to follow.    Please call for questions.    Thanks,  José Miguel Calvo MD  Infectious Disease Fellow, PGY-5  Pager: [phone] or ext: [phone]  [facility]

## 2018-11-07 NOTE — PROGRESS NOTES
Admit Note     Met with spouse and significant other  to assess patients needs due to presenting with altered mental status (documented per medical record) .  Patient is a 28 y.o. single female, admitted Sepsis [A41.9]. Pt is s/p lung transplant from 6/12/2014.    Patient admitted from [place] on 11/7/2018 .  At this time, patient presents with  altered mental status.  At this time, patients caregiver presents as alert and oriented x 4, pleasant, good eye contact, recall good, concentration/judgement good and asking and answering questions appropriately.      Household/Family Systems (as reported by patients caregiver)     Patient resides with patient's significant other, at [address] [place] [address].  Support system includes pt's mother Kim Ibarra, aunt Mariel Calero, and s/o Shawn Castrejon.  Patient does not have dependents that are need of being cared for.     Patients primary caregiver is Kim Ibarra, patients mother, phone number [phone], and Shawn Castrejon, pt's s/o, phone number [phone].  Confirmed patients contact information is [phone] (home);   Telephone Information:   Mobile [phone]   .    During admission, patient's caregiver plans to stay in patient's room.  Confirmed patient and patients caregivers do have access to reliable transportation.    Cognitive Status/Learning     Patients caregiver reports patients reading ability as 8th grade and states patient does have difficulty with short term memory.  Patients caregiver reports patient learns best by Verbal explanation/Demonstration/ Hands on practice.   Needed: No.   Highest education level: Grade School (0-8)    Vocation/Disability (as reported by patients caregiver)    Working for Income: No  If no, reason not working: Disability  Patient is disabled due to Cystic Fibrosis since 2004. Pt was taking classes part-time at [employer] in [place] but dropped out due to illness. Pt  reports that she does not plan on returning.     Adherence     Patients caregiver reports patient has a high level of adherence to patients health care regimen.  Adherence counseling and education provided.  Patient's caregiver verbalizes understanding.    Substance Use    Patients caregiver reports patients substance usage as the following:    Tobacco: none, patient denies any use.  Alcohol: none, patient denies any use.  Illicit Drugs/Non-prescribed Medications: Pt's labs returned positive for Barbiturates and Opioids. Pt is prescribed Opioids. S/o states pt had been taking Fioricet prior to admit for headaches.   Patients caregiver states clear understanding of the potential impact of substance use.  Substance abstinence/cessation counseling, education and resources provided and reviewed.     Services Utilizing/ADLS (as reported by patients caregiver)    Infusion Service: Prior to admission, patient utilizing? no however in past with [facility] [phone]/ [facility] [phone]   Home Health: Prior to admission, patient utilizing? no  DME: Prior to admission, no  Pulmonary/Cardiac Rehab: Prior to admission, no  Dialysis:  Prior to admission, no  Transplant Specialty Pharmacy:  Prior to admission, yes, [facility]    Prior to admission, patients caregiver reports patient was independent with ADLS and was driving.  Patients caregiver reports patient is not able to care for self at this time due to compromised medical condition (as documented in medical record) and physical weakness..  Patients caregiver reports patient indicates a willingness to care for self once medically cleared to do so.    Insurance/Medications    Insured by   Payor/Plan Subscr  Sex Relation Sub. Ins. ID Effective Group Num   1. MEDICAID - * ELOISE WILLIAM* 1990 Female  [id number] 2/1/15 [facility]                                   [address] [address]      Primary Insurance (for UNOS reporting): Public Insurance - Medicaid  Secondary Insurance  (for UNOS reporting): None    Patients caregiver reports patient is able to obtain and afford medications at this time and at time of discharge.    Living Will/Healthcare Power of     Patients caregiver reports patient has a LW and/or HCPA. Pt HCPA is s/o Shawn Castrejon ([phone]).     Coping/Mental Health (as reported by patients caregiver)    Patient coping is unable to be assessed at this time.  Patients caregiver is coping adequately with the aid of  family members.      Discharge Planning (as reported by patients caregiver)    At time of discharge, patient plans to return to patient's home under the care of s/o.  Patients significant other will transport patient.  Per rounds today, expected discharge date has not been medically determined at this time. Patients caretaker verbalizes understanding and is involved in treatment planning and discharge process.    Additional Concerns    Patient's caretaker denies additional needs and/or concerns at this time.  providing ongoing psychosocial support, education, resources and d/c planning as needed.  SW remains available. Patient's caregiver verbalizes understanding and agreement with information reviewed,  availability and how to access available resources as needed.

## 2018-11-07 NOTE — PROGRESS NOTES
New CRRT orders noted and instituted, machine alarms engaged, lines visible and secured. Ports with sluggish flows, lines reversed.  Report given to primary RN.

## 2018-11-07 NOTE — HPI
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 Per  beginning Sunday evening patient feeling unwell & w/ poor PO intake. Since that time he states she progressively became more lethargic and  decided to bring her to the ED. On presentation patient hypotensive to 83/52, found to have pH 6.9, LISBETH w/ creatinine to 6.1 w/ electrolyte derangements. She was started on bicarb gtt and Narcan gtt as well as vanc & zosyn. ID consulted for antibiotic management due to concern for sepsis.

## 2018-11-07 NOTE — ASSESSMENT & PLAN NOTE
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 Patient presents as a transfer from [facility] in [place] on 11/7 after presenting there with a 3 day history of lethargy and dizziness per spouse. She was transferred to [facility] via flight care team overnight. She was started on a bicarb gtt 2/2 severe metabolic acidosis and narcan drip 2/2 hypercapnic respiratory failure. ABG on admit was 6.9/59 and her bicarb was 12. ABG improved to 7.2/48.6 and bicarb to 21.8 since being on bicarb drip. Patient on 2 L via NC. The patient was hyperkalemic, 6.5, on admit, and was shifted. Potasium 5.1 after medications. Nephrology consulted for LISBETH and severe metabolic acidosis. The patient's spouse denies a history of kidney issues. Her baseline SCr from 2015 to 2018 has been around ~ 1.0. Her appointment visits in October revealed a SCr of 1.5 and 1.8. The patient was admitted with a SCr of 6.1. Urine outputt has been between 10-30 ml/hr. She put out a total of 300 ml during night shift. UA today with 2+ protein. UPCR pending. LISBETH secondary to ATN likely caused by hypotension/sepsis in the setting of severe metabolic acidosis.     Will plan for RRT for clearance secondary to persistent acidosis. No UF.   Maintain urinary catheter for strict I/Os.   Will continue trending SCr, electrolytes and acid/base status, and volume status.   Will order renal US  Will order urine creatinine/protein ratio  Avoid NSAIDs, contrast, nephrotoxins   Monitor strict I/Os, daily weights  Renally dose medications to current GFR  Will discuss with staff

## 2018-11-08 PROBLEM — G93.41 ACUTE METABOLIC ENCEPHALOPATHY: Status: ACTIVE | Noted: 2018-01-01

## 2018-11-08 NOTE — PROGRESS NOTES
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 Per  beginning Sunday evening patient feeling unwell & w/ poor PO intake. Since that time he states she progressively became more lethargic and  decided to bring her to the ED. On presentation patient hypotensive to 83/52, found to have pH 6.9, LISBETH w/ creatinine to 6.1 w/ electrolyte derangements. She was started on bicarb gtt and Narcan gtt as well as vanc & zosyn. ID consulted for antibiotic management due to concern for sepsis.    Based on history, physical exam, imaging, and lab results patient's currently clinical status likely 2/2 acidosis 2/2 acute renal failure (creatinine on admission 6.1, baseline creatinine .9-1.2) likely 2/2 poor PO intake in conjunction w/ nephrotoxic medications (including lisinopril, cellcept, tracrolimus). Creatinine 1.5 on 10/22 (creatinine possibly progressively worsened since that point). Encephalopathy likely 2/2 severe acidemia, and opiates & benzos (which will take longer to clear 2/2 severe renal failure). Convulsions likely 2/2 benzo withdrawal (now resolved).    Patient clinically improving, metabolic acidosis resolved s/p SLED  Patient still encephalopathic, however, markedly improved; Likely 2/2 metabolic insults & likely lagging behind metabolic derangements s/p SLED  Per primary team LP to be performed  Recommend c/w vanc for now  Recommend switching zosyn to cefepime  F/u LP results, if  unremarkable recommend discontinuing all antibiotics as likely clinical picture can be explained by metabolic   F/u cx         Anticipated Disposition:     Thank you for your consult. I will sign off. Please contact us if you have any additional questions.    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 Per  beginning Sunday evening patient feeling unwell & w/ poor PO intake. Since that time he states she progressively became more lethargic and  decided to bring her to the ED. On presentation patient hypotensive to 83/52, found to have pH 6.9, LISBETH w/ creatinine to 6.1 w/ electrolyte derangements. She was started on bicarb gtt and Narcan gtt as well as vanc & zosyn. ID consulted for antibiotic management due to concern for sepsis.  Interval History: Sled performed overnight. Metabolic acidemia resolved (pH 7.33 on VBG). Temp to 100.7 at ~11 AM. Patient's mentation improving. CT head unremarkable. Per primary team plan for LP today.    Review of Systems  Objective:     Vital Signs (Most Recent):  Temp: 99.6 °F (37.6 °C) (11/08/18 1530)  Pulse: 96 (11/08/18 1715)  Resp: 16 (11/08/18 1715)  BP: (!) 157/80 (11/08/18 1700)  SpO2: 100 % (11/08/18 1715) Vital Signs (24h Range):  Temp:  [97.9 °F (36.6 °C)-100.7 °F (38.2 °C)] 99.6 °F (37.6 °C)  Pulse:  [] 96  Resp:  [8-37] 16  SpO2:  [93 %-100 %] 100 %  BP: (133-161)/(64-90) 157/80     Weight: 57 kg (125 lb 10.6 oz)  Body mass index is 23.74 kg/m².    Estimated Creatinine Clearance: 34.8 mL/min (A) (based on SCr of 1.8 mg/dL (H)).    Physical Exam   Constitutional:   Generalized muscle twitching   HENT:    Head: Normocephalic and atraumatic.   Eyes: Conjunctivae and EOM are normal. Pupils are equal, round, and reactive to light.   Cardiovascular: Intact distal pulses.   No murmur heard.  tachycardic   Pulmonary/Chest: No stridor. She has no wheezes. She has no rales.   Nasal cannula in place   Abdominal: Soft. She exhibits no distension. There is no tenderness. There is no guarding.   Genitourinary:   Genitourinary Comments: Goldberg catheter   Musculoskeletal: She exhibits no edema or tenderness.   Femoral line in place   Lymphadenopathy:     She has no cervical adenopathy.   Neurological:   Encephalopathic but answering some questions appropriately, also able to follow some commands when persistently asked to perform a task   Skin: Skin is warm. She is not diaphoretic. No pallor.       Significant Labs:   CBC:   Recent Labs   Lab 11/07/18  0850 11/07/18  1408 11/08/18  0409   WBC 8.68  --  4.62   HGB 8.6*  --  7.1*   HCT 29.6* 23* 23.7*   *  --  70*     CMP:   Recent Labs   Lab 11/07/18  0312  11/07/18  2151 11/08/18  0409 11/08/18  1417      < > 145  145 145  145 141   K 6.5*   < > 4.1  4.1 4.1  4.1 4.0   *   < > 105  105 103  103 102   CO2 9*   < > 29  29 31*  31* 34*   *   < > 120*  120* 115*  115* 158*   BUN 54*   < > 14  14 5*  5* 5*   CREATININE 6.1*   < > 2.3*  2.3* 1.1  1.1 1.8*   CALCIUM 8.0*   < > 7.1*  7.1* 6.9*  6.9* 7.3*   PROT 6.0  --   --   --   --    ALBUMIN 3.2*   < > 2.7* 2.7* 2.6*   BILITOT 0.3  --   --   --   --    ALKPHOS 166*  --   --   --   --    AST 18  --   --   --   --    ALT 17  --   --   --   --    ANIONGAP 17*   < > 11  11 11  11 5*   EGFRNONAA 8.6*   < > 27.9*  27.9* >60.0  >60.0 37.5*    < > = values in this interval not displayed.       Significant Imaging: I have reviewed all pertinent imaging results/findings within the past 24 hours.

## 2018-11-08 NOTE — ASSESSMENT & PLAN NOTE
Opioid overdose + acute renal failure from poor PO intake likely cause for acute respiratory failure and encephalopathy. Hold all sedating medications for now.     Will use IV Zofran and Phenergan at this time for relief from any opiate withdrawal symptoms. IV tylenol unavailable at this time.

## 2018-11-08 NOTE — ASSESSMENT & PLAN NOTE
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 Patient presents as a transfer from [facility] in [place] on 11/7 after presenting there with a 3 day history of lethargy and dizziness per spouse. She was transferred [place] [facility] via flight care team overnight. She was started on a bicarb gtt 2/2 severe metabolic acidosis and narcan drip 2/2 hypercapnic respiratory failure. ABG on admit was 6.9/59 and her bicarb was 12. ABG improved to 7.2/48.6 and bicarb to 21.8 since being on bicarb drip. Patient on 2 L via NC. The patient was hyperkalemic, 6.5, on admit, and was shifted. Potasium 5.1 after medications. Nephrology consulted for LISBETH and severe metabolic acidosis. The patient's spouse denies a history of kidney issues. Her baseline SCr from 2015 to 2018 has been around ~ 1.0. Her appointment visits in October revealed a SCr of 1.5 and 1.8. The patient was admitted with a SCr of 6.1. Urine outputt has been between 10-30 ml/hr. She put out a total of 300 ml during night shift. UA today with 2+ protein. UPCR pending. LISBETH secondary to ATN likely caused by hypotension/sepsis in the setting of severe metabolic acidosis.     No urgent need for continual SLED at this time  SLED overnight for metabolic clearance. Patient tolerated well. Electrolytes and acid base improved.   Patient still legarthric during AM assessment, following some commands. PCO2 56.6, unsure of patient's baseline value   Maintain urinary catheter for strict I/Os. Low UOP, 355 ml/24h  Renal US completed on 11/7, showing reduced perfusion bilaterally   Urine creatinine/protein ratio was 0.51  Avoid NSAIDs, contrast, nephrotoxins   Monitor strict I/Os, daily weights  Renally dose medications to current GFR  Will discuss  with staff

## 2018-11-08 NOTE — ASSESSMENT & PLAN NOTE
Likely 2/2 acute renal failure + opioid overdose. Received CRRT overnight with minimal change in mental status. Will hold all nephrotoxic/sedating medications.    Will obtain CT head and LP this afternoon. Continue to monitor.

## 2018-11-08 NOTE — ASSESSMENT & PLAN NOTE
Opioid overdose + acute renal failure from poor PO intake likely cause for acute respiratory failure and encephalopathy. Hold all sedating medications for now.

## 2018-11-08 NOTE — ASSESSMENT & PLAN NOTE
FEV1 with persistent decline on outpatient PFTs. Currently on 2L HFNC. Her acute respiratory failure likely secondary to opoid overdose + acute renal failure. Received narcan x 2 en route to [facility] and was placed on narcan gtt with minimal improvement in mental status. Wean oxygen to maintain O2 sats >92%.

## 2018-11-08 NOTE — PLAN OF CARE
Problem: Patient Care Overview  Goal: Plan of Care Review  Plan of care reviewed with pt and spouse. Pt LOC improving, able to follow all commands and speech becoming more clear. Pt maintaining O2 sats >95% on 2 L NC.  Pt on CRRT at UF rate of 200, no complications noted. UO minimal this shift. MD updated on this am labs. All questions answered per RN at bedside. VSS. Will continue to monitor.

## 2018-11-08 NOTE — ASSESSMENT & PLAN NOTE
Likely multifactorial 2/2 poor PO intake from acute illness coupled with nephrotoxic medications. Initially received 4L LR with bicarb gtt with minimal UOP. Received SLED 11/7 and discontinued 11/8 due to resolution of acidosis and hyperkalemia.     Cr 2.6 today. Will plan to resume tacrolimus 1 mg BID sublingually. Nephrology following, appreciate recs.

## 2018-11-08 NOTE — SUBJECTIVE & OBJECTIVE
Interval History: Sled performed overnight. Metabolic acidemia resolved (pH 7.33 on VBG). Temp to 100.7 at ~11 AM. Patient's mentation improving. CT head unremarkable. Per primary team plan for LP today.    Review of Systems  Objective:     Vital Signs (Most Recent):  Temp: 99.6 °F (37.6 °C) (11/08/18 1530)  Pulse: 96 (11/08/18 1715)  Resp: 16 (11/08/18 1715)  BP: (!) 157/80 (11/08/18 1700)  SpO2: 100 % (11/08/18 1715) Vital Signs (24h Range):  Temp:  [97.9 °F (36.6 °C)-100.7 °F (38.2 °C)] 99.6 °F (37.6 °C)  Pulse:  [] 96  Resp:  [8-37] 16  SpO2:  [93 %-100 %] 100 %  BP: (133-161)/(64-90) 157/80     Weight: 57 kg (125 lb 10.6 oz)  Body mass index is 23.74 kg/m².    Estimated Creatinine Clearance: 34.8 mL/min (A) (based on SCr of 1.8 mg/dL (H)).    Physical Exam   Constitutional:   Generalized muscle twitching   HENT:   Head: Normocephalic and atraumatic.   Eyes: Conjunctivae and EOM are normal. Pupils are equal, round, and reactive to light.   Cardiovascular: Intact distal pulses.   No murmur heard.  tachycardic   Pulmonary/Chest: No stridor. She has no wheezes. She has no rales.   Nasal cannula in place   Abdominal: Soft. She exhibits no distension. There is no tenderness. There is no guarding.   Genitourinary:   Genitourinary Comments: Goldberg catheter   Musculoskeletal: She exhibits no edema or tenderness.   Femoral line in place   Lymphadenopathy:     She has no cervical adenopathy.   Neurological:   Encephalopathic but answering some questions appropriately, also able to follow some commands when persistently asked to perform a task   Skin: Skin is warm. She is not diaphoretic. No pallor.       Significant Labs:   CBC:   Recent Labs   Lab 11/07/18  0850 11/07/18  1408 11/08/18  0409   WBC 8.68  --  4.62   HGB 8.6*  --  7.1*   HCT 29.6* 23* 23.7*   *  --  70*     CMP:   Recent Labs   Lab 11/07/18  0312  11/07/18  2151 11/08/18  0409 11/08/18  1417      < > 145  145 145  145 141   K 6.5*   < >  4.1  4.1 4.1  4.1 4.0   *   < > 105  105 103  103 102   CO2 9*   < > 29  29 31*  31* 34*   *   < > 120*  120* 115*  115* 158*   BUN 54*   < > 14  14 5*  5* 5*   CREATININE 6.1*   < > 2.3*  2.3* 1.1  1.1 1.8*   CALCIUM 8.0*   < > 7.1*  7.1* 6.9*  6.9* 7.3*   PROT 6.0  --   --   --   --    ALBUMIN 3.2*   < > 2.7* 2.7* 2.6*   BILITOT 0.3  --   --   --   --    ALKPHOS 166*  --   --   --   --    AST 18  --   --   --   --    ALT 17  --   --   --   --    ANIONGAP 17*   < > 11  11 11  11 5*   EGFRNONAA 8.6*   < > 27.9*  27.9* >60.0  >60.0 37.5*    < > = values in this interval not displayed.       Significant Imaging: I have reviewed all pertinent imaging results/findings within the past 24 hours.

## 2018-11-08 NOTE — ASSESSMENT & PLAN NOTE
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     Will plan to initiate tacrolimus sublingually, MMF, and solumedrol.

## 2018-11-08 NOTE — ASSESSMENT & PLAN NOTE
Previously on Creon with meals and snacks. Patient NPO for now. Will consider NG tube placement if mental status remains unchanged.

## 2018-11-08 NOTE — ASSESSMENT & PLAN NOTE
Likely multifactorial 2/2 poor PO intake from acute illness coupled with nephrotoxic medications. Initially received 4L LR with bicarb gtt with minimal UOP. Received CRRT overnight and discontinued today due to resolution of acidosis and hyperkalemia.     Will continue to hold all nephrotoxic medications. Nephrology following, appreciate recs.

## 2018-11-08 NOTE — PROGRESS NOTES
[facility]  Lung Transplant  Progress Note - Critical Care    Patient Name: Juanita Ibarra  MRN: [id number]  Admission Date: 11/7/2018  Hospital Length of Stay: 1 days  Post-Operative Day: 1610  Attending Physician: Francisca Morin DO  Primary Care Provider: Jake Alvarez MD     Subjective:     Interval History: Patient remained on SLED overnight with improvement in acidosis. Slightly more alert this morning, but not following commands. Febrile with tmax 100.7.    Continuous Infusions:   sodium chloride 0.9% 200 mL/hr at 11/08/18 0600     Scheduled Meds:   diphenhydrAMINE  50 mg Intravenous Once    fluticasone-vilanterol  1 puff Inhalation Daily    pantoprozole (PROTONIX) IV  40 mg Intravenous Daily    piperacillin-tazobactam (ZOSYN) IVPB  4.5 g Intravenous Q12H    vancomycin (VANCOCIN) IVPB  15 mg/kg (Order-Specific) Intravenous Q24H     PRN Meds:acetaminophen, levalbuterol, magnesium sulfate IVPB **AND** magnesium sulfate IVPB, magnesium sulfate IVPB, naloxone, ondansetron, potassium chloride 10% **AND** potassium chloride 10% **AND** potassium chloride 10%, potassium phosphate IVPB, tiZANidine    Review of patient's allergies indicates:   Allergen Reactions    Albuterol Palpitations    Colistin Anaphylaxis    Vancomycin analogues      Infusion reaction that does not resolve with slowing    Neupogen [filgrastim] Other (See Comments)     Ostealgia after five daily doses of 300 mcg.      Bactrim [sulfamethoxazole-trimethoprim] Hives    Ceftazidime Hives     Pt stated can tolerate cefapine not ceftazidime    Ceftazidime     Dronabinol Other (See Comments)     Mental changes/hallucinations    Haldol [haloperidol lactate] Other (See Comments)     Seizure like activity    Nsaids (non-steroidal anti-inflammatory drug)      Cannot have due to lung transplant    Adhesive Rash     Cloth tape- please use tegaderm or paper tape    Aztreonam Rash    Ciprofloxacin  Nausea And Vomiting     Projectile N/V, per patient.  Unwilling to retry therapy.       Review of Systems   Unable to perform ROS: Acuity of condition     Objective:   Physical Exam   Constitutional: She appears well-developed and well-nourished. No distress.   Ill appearing   HENT:   Head: Atraumatic.   Nose: Nose normal.   Round facies   Eyes: Conjunctivae and EOM are normal. No scleral icterus.   Neck: Normal range of motion. Neck supple. No JVD present. No tracheal deviation present.   Cardiovascular: Regular rhythm, normal heart sounds and intact distal pulses. Tachycardia present. Exam reveals no gallop and no friction rub.   No murmur heard.  Pulmonary/Chest: No stridor. She has no decreased breath sounds. She has no wheezes. She has no rhonchi. She has no rales.   Abdominal: Soft. Bowel sounds are normal. She exhibits no distension. There is no tenderness.   Musculoskeletal: Normal range of motion. She exhibits no edema, tenderness or deformity.   Neurological:   Opens eyes to voice, withdraws to pain, not following commands   Skin: Skin is warm and dry. No rash noted. She is not diaphoretic. No erythema. No pallor.   Nursing note and vitals reviewed.        Vital Signs (Most Recent):  Temp: (!) 100.7 °F (38.2 °C) (11/08/18 1101)  Pulse: 107 (11/08/18 1315)  Resp: 19 (11/08/18 1315)  BP: (!) 142/78 (11/08/18 1300)  SpO2: 100 % (11/08/18 1315) Vital Signs (24h Range):  Temp:  [97.9 °F (36.6 °C)-100.7 °F (38.2 °C)] 100.7 °F (38.2 °C)  Pulse:  [] 107  Resp:  [8-42] 19  SpO2:  [93 %-100 %] 100 %  BP: (113-161)/(64-90) 142/78     Weight: 57 kg (125 lb 10.6 oz)  Body mass index is 23.74 kg/m².      Intake/Output Summary (Last 24 hours) at 11/8/2018 1351  Last data filed at 11/8/2018 1200  Gross per 24 hour   Intake 4792 ml   Output 3413 ml   Net 1379 ml       Ventilator Data:          Hemodynamic Parameters:       Lines/Drains:       Port A Cath Single Lumen right subclavian (Active)   Dressing Type  Transparent 11/8/2018 11:15 AM   Dressing Status Clean;Dry;Intact 11/8/2018 11:15 AM   Dressing Intervention Dressing reinforced 11/8/2018  3:05 AM   Dressing Change Due 10/06/18 10/4/2018  8:00 PM   Line Status Infusing 11/8/2018 11:15 AM   Flush Performed Yes 11/8/2018 11:15 AM   Date to be Reflushed 10/04/18 10/3/2018  8:00 PM   Daily Line Review Performed 11/8/2018 11:15 AM   Type of Needle Nicole 11/8/2018 11:15 AM   Gauge 20 11/8/2018 11:15 AM   Needle Length 3/4 in 10/1/2018  8:00 AM   Needle Status Left in place 11/8/2018 11:15 AM   Needle Insertion Date 09/29/18 10/1/2018  8:00 AM   Needle Insertion Time 1800 10/1/2018  8:00 AM   Needle Removal Date 09/29/18 9/29/2018 11:00 AM   Needle Removal Time 1136 9/29/2018 11:00 AM   Number of days:             Trialysis (Dialysis) Catheter 11/07/18 1023 right femoral (Active)   IV Device Securement sutures 11/8/2018 11:15 AM   Additional Site Signs no drainage;no streak formation;no palpable cord;no pain;no edema;no warmth;no erythema 11/8/2018 11:15 AM   Patency/Care flushed w/o difficulty 11/8/2018 11:15 AM   Site Assessment Clean;Dry;Intact;No redness;No swelling 11/8/2018 11:15 AM   Status Accessed 11/8/2018 11:15 AM   Flows Good 11/8/2018 11:15 AM   Dressing Intervention Dressing reinforced 11/8/2018  3:05 AM   Dressing Status Clean;Dry;Intact;Biopatch in place 11/8/2018 11:15 AM   Dressing Change Due 11/14/18 11/8/2018 11:15 AM   Verification by X-ray Yes 11/7/2018 10:30 AM   Site Condition No complications 11/8/2018 11:15 AM   Dressing Occlusive 11/8/2018 11:15 AM   Daily Line Review Performed 11/8/2018 11:15 AM   Number of days: 1            Peripheral IV - Single Lumen 11/07/18 0200 Right Hand (Active)   Site Assessment Clean;Dry;Intact;No redness;No swelling 11/8/2018 11:15 AM   Line Status Saline locked 11/8/2018 11:15 AM   Dressing Status Intact;Dry;Clean 11/8/2018 11:15 AM   Dressing Intervention New dressing 11/8/2018  3:05 AM   Dressing Change Due  "11/11/18 11/8/2018  3:05 AM   Site Change Due 11/11/18 11/8/2018 11:15 AM   Reason Not Rotated Not due 11/8/2018 11:15 AM   Number of days: 1            Urethral Catheter 11/07/18 0300 (Active)   Site Assessment Clean;Intact 11/8/2018 11:15 AM   Collection Container Urimeter 11/8/2018 11:15 AM   Securement Method secured to top of thigh w/ adhesive device 11/8/2018 11:15 AM   Catheter Care Performed yes 11/8/2018 11:15 AM   Reason for Continuing Urinary Catheterization Critically ill in ICU requiring intensive monitoring 11/8/2018 11:15 AM   CAUTI Prevention Bundle StatLock in place w 1" slack;Intact seal between catheter & drainage tubing;Drainage bag off the floor;Green sheeting clip in use;No dependent loops or kinks;Drainage bag not overfilled (<2/3 full);Drainage bag below bladder 11/8/2018 11:15 AM   Output (mL) 5 mL 11/8/2018 12:00 PM   Number of days: 1       Significant Labs:  CBC:  Recent Labs   Lab 11/08/18  0409   WBC 4.62   RBC 2.61*   HGB 7.1*   HCT 23.7*   PLT 70*   MCV 91   MCH 27.2   MCHC 30.0*     BMP:  Recent Labs   Lab 11/08/18  0409     145   K 4.1  4.1     103   CO2 31*  31*   BUN 5*  5*   CREATININE 1.1  1.1   CALCIUM 6.9*  6.9*      Tacrolimus Levels:  Recent Labs   Lab 11/07/18  0503   TACROLIMUS 4.6*     Microbiology:  Microbiology Results (last 7 days)     Procedure Component Value Units Date/Time    Fungus Culture, Blood or Bone Marrow [[id number]] Collected:  11/07/18 1715    Order Status:  Completed Specimen:  Blood Updated:  11/08/18 1323     Fungus Cult, blood or BM Culture in progress    Respiratory Viral Panel by PCR [facility]; Nasal Swab [[id number]] Collected:  11/07/18 0346    Order Status:  Completed Specimen:  Respiratory Updated:  11/08/18 1212     Respiratory Virus Panel, source Nasal Swab     RVP - Adenovirus Not Detected     Comment: Detects Serotypes B and E. Detection of Serotype C may   be limited. If Adenovirus infection is suspected and a   Not Detected " result is returned the sample should be   re-tested for Adenovirus using an independent method  (e.g. [email or web address] Adenovirus Quantitative Real-Time  PCR test.          Enterovirus Not Detected     Comment: Cross-reactivity has been observed between certain Rhinovirus  strains and the Enterovirus assay.          Human Bocavirus Not Detected     Human Coronavirus Not Detected     Comment: The Human Coronavirus assay detects Human coronavirus types  229E, OC43,NL63 and HKU1.          RVP - Human Metapneumovirus (hMPV) Not Detected     RVP - Influenza A Not Detected     Influenza A - O1W9-93 Not Detected     RVP - Influenza B Not Detected     Parainfluenza Not Detected     Respiratory Syncytial VirusVirus (RSV) A Not Detected     Comment: The Respiratory Syncytial Viral assay detects types A and B,  however it does not distinguish between the two.          RVP - Rhinovirus Not Detected     Comment: Cross-Reactivity has been observed between certain   Rhinovirus strains and the Enterovirus assay.  Target Enriched Mulitplex Polymerase Chain Reaction (TEM-PCR)  allows for the detection of multiple pathogens out of a single  reaction.  This test was developed and its performance   characteristics determined by [employer].  It has not   been cleared or approved by the U.S.Food and Drug Administration.  Results should be used in conjunction with clinical findings,   and should not form the sole basis for a diagnosis or treatment  decision.  TEM-PCR is a licensed technology of [employer].         Narrative:       Receiving Lab:->[facility]    Blood culture [[id number]] Collected:  11/07/18 0252    Order Status:  Completed Specimen:  Blood from Peripheral, Wrist, Right Updated:  11/08/18 0613     Blood Culture, Routine No Growth to date     Blood Culture, Routine No Growth to date    Blood culture [[id number]] Collected:  11/07/18 0200    Order Status:  Completed Specimen:  Blood from Peripheral,  Antecubital, Right Updated:  11/08/18 0613     Blood Culture, Routine No Growth to date     Blood Culture, Routine No Growth to date    Culture, Respiratory with Gram Stain [[id number]]     Order Status:  No result Specimen:  Respiratory from Sputum, Expectorated           I have reviewed all pertinent labs within the past 24 hours.    Diagnostic Results:  Chest X-Ray: I personally reviewed the films and findings are: no acute process        Assessment/Plan:     * Acute metabolic encephalopathy    Likely 2/2 acute renal failure + opioid overdose. Received SLED overnight with minimal change in mental status. Will hold all nephrotoxic/sedating medications.    Will obtain CT head and LP this afternoon. Continue to monitor.      Lung transplant status, bilateral    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     Will hold all immunosuppression and ppx for encephalopathy.      Prophylactic antibiotic    On dapsone 100 mg daily outpatient. Will hold for now.      Immunosuppression    Current outpatient regimen is tacrolimus 3.5 mg BID, prednisone 5 mg daily, and  mg BID.    Will continue to hold immunosuppression and nephrotoxic medications.      Bronchiolitis obliterans syndrome    FEV1 with persistent decline on outpatient PFTs. Currently on 2L HFNC. Her acute respiratory failure likely secondary to opoid overdose + acute renal failure. Received narcan x 2 en route to [facility] and was placed on narcan gtt with minimal improvement in mental status.     Wean oxygen to maintain O2 sats >92%.     Sepsis    Patient presented with AMS with LISBETH, severe anion gap metabolic acidosis, hypotension and hyperkalemia. Placed on empiric Vanc/Zosyn upon admission. Patient's  reporting headaches and poor PO intake prior to admission. Urine and blood cultures negative. Concerns for  sepsis from unknown source. Patient febrile this afternoon with Tmax 100.6.     Will start empiric acyclovir for viral meningitis. Plan for LP after head CT is obtained. Continue supportive care. ID following, appreciate recs.      Acute kidney failure    Likely multifactorial 2/2 poor PO intake from acute illness coupled with nephrotoxic medications. Initially received 4L LR with bicarb gtt with minimal UOP. Received SLED overnight and discontinued today due to resolution of acidosis and hyperkalemia.     Will continue to hold all nephrotoxic medications. Nephrology following, appreciate recs.      Chronic pain with opiate use    Opioid overdose + acute renal failure from poor PO intake likely cause for acute respiratory failure and encephalopathy. Hold all sedating medications for now.      CF related Pancreatic insufficiency    Previously on Creon with meals and snacks. Patient NPO for now.          Preventive Measures: Stress Ulcer: continue prophyllaxis, DVT: continue prophyllaxis, Head of Bet: elevated, Reposition: per unit routine    Counseling/Consultation:I discussed the case with Dr. Morin.      Shama Canales PA-C  Lung Transplant  [facility]

## 2018-11-08 NOTE — ASSESSMENT & PLAN NOTE
Likely secondary to acute renal failure and hypoventilation related to opioid overdose. Received SLED 11/7 with slight improvement in mental status.     Continue acyclovir for concern for viral meningitis. Previous HSV studies in 2013 negative. Varicella IgG positive in 2013. CT Head 11/8 negative for acute process. LP on 11/9 at 1500.

## 2018-11-08 NOTE — ASSESSMENT & PLAN NOTE
Current outpatient regimen is tacrolimus 3.5 mg BID, prednisone 5 mg daily, and  mg BID.    Will begin tacrolimus sublingually at 1 mg BID due to aspiration concerns secondary to recent mental status change. Will begin Solumedrol and MMF via IV route.

## 2018-11-08 NOTE — ASSESSMENT & PLAN NOTE
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     Will hold all immunosuppression and ppx for encephalopathy.

## 2018-11-08 NOTE — ASSESSMENT & PLAN NOTE
Current outpatient regimen is tacrolimus 3.5 mg BID, prednisone 5 mg daily, and  mg BID.    Will continue to hold immunosuppression and nephrotoxic medications.

## 2018-11-08 NOTE — ASSESSMENT & PLAN NOTE
FEV1 with persistent decline on outpatient PFTs. Currently on 2L NC. Her acute respiratory failure likely secondary to opioid overdose + acute renal failure. Received narcan x 2 en route to [facility] and was placed on narcan gtt with minimal improvement in mental status. Mental status has improved following round of CRRT and sodium bicarb gtt.     Wean oxygen to maintain O2 sats >88%.

## 2018-11-08 NOTE — ASSESSMENT & PLAN NOTE
29 y/o F PMhx CF s/p bilateral lung transplant CF complicated by A1 Rejection 8/2014, recurrent C glabrata positive sputum, CMV viremia 7/2015, and CARLOS EDUARDO who presented to [facility] in [place] w/ encephalopathy s/p transfer to [facility]. Per  beginning Sunday evening patient feeling unwell & w/ poor PO intake. Since that time he states she progressively became more lethargic and  decided to bring her to the ED. On presentation patient hypotensive to 83/52, found to have pH 6.9, LISBETH w/ creatinine to 6.1 w/ electrolyte derangements. She was started on bicarb gtt and Narcan gtt as well as vanc & zosyn. ID consulted for antibiotic management due to concern for sepsis.    Based on history, physical exam, imaging, and lab results patient's currently clinical status likely 2/2 acidosis 2/2 acute renal failure (creatinine on admission 6.1, baseline creatinine .9-1.2) likely 2/2 poor PO intake in conjunction w/ nephrotoxic medications (including lisinopril, cellcept, tracrolimus). Creatinine 1.5 on 10/22 (creatinine possibly progressively worsened since that point). Encephalopathy likely 2/2 severe acidemia, and opiates & benzos (which will take longer to clear 2/2 severe renal failure). Convulsions likely 2/2 benzo withdrawal (now resolved).    Patient clinically improving, metabolic acidosis resolved s/p SLED  Patient still encephalopathic, however, markedly improved; Likely 2/2 metabolic insults & likely lagging behind metabolic derangements s/p SLED  Per primary team LP to be performed  Recommend c/w vanc for now  Recommend switching zosyn to cefepime  F/u LP results, if unremarkable recommend discontinuing all antibiotics as likely clinical picture can be explained by metabolic   F/u cx

## 2018-11-08 NOTE — SUBJECTIVE & OBJECTIVE
Subjective:     Interval History: Patient remained on CRRT overnight with improvement in acidosis. Slightly more alert this morning, but not following commands. Remains afebrile on Vanc/Zosyn.     Continuous Infusions:   sodium chloride 0.9% 200 mL/hr at 11/08/18 0600     Scheduled Meds:   diphenhydrAMINE  50 mg Intravenous Once    fluticasone-vilanterol  1 puff Inhalation Daily    pantoprozole (PROTONIX) IV  40 mg Intravenous Daily    piperacillin-tazobactam (ZOSYN) IVPB  4.5 g Intravenous Q12H    vancomycin (VANCOCIN) IVPB  15 mg/kg (Order-Specific) Intravenous Q24H     PRN Meds:acetaminophen, levalbuterol, magnesium sulfate IVPB **AND** magnesium sulfate IVPB, magnesium sulfate IVPB, naloxone, ondansetron, potassium chloride 10% **AND** potassium chloride 10% **AND** potassium chloride 10%, potassium phosphate IVPB, tiZANidine    Review of patient's allergies indicates:   Allergen Reactions    Albuterol Palpitations    Colistin Anaphylaxis    Vancomycin analogues      Infusion reaction that does not resolve with slowing    Neupogen [filgrastim] Other (See Comments)     Ostealgia after five daily doses of 300 mcg.      Bactrim [sulfamethoxazole-trimethoprim] Hives    Ceftazidime Hives     Pt stated can tolerate cefapine not ceftazidime    Ceftazidime     Dronabinol Other (See Comments)     Mental changes/hallucinations    Haldol [haloperidol lactate] Other (See Comments)     Seizure like activity    Nsaids (non-steroidal anti-inflammatory drug)      Cannot have due to lung transplant    Adhesive Rash     Cloth tape- please use tegaderm or paper tape    Aztreonam Rash    Ciprofloxacin Nausea And Vomiting     Projectile N/V, per patient.  Unwilling to retry therapy.       Review of Systems   Unable to perform ROS: Acuity of condition     Objective:   Physical Exam   Constitutional: She appears well-developed and well-nourished. No distress.   Ill appearing   HENT:   Head: Atraumatic.   Nose: Nose  normal.   Round facies   Eyes: Conjunctivae and EOM are normal. No scleral icterus.   Neck: Normal range of motion. Neck supple. No JVD present. No tracheal deviation present.   Cardiovascular: Regular rhythm, normal heart sounds and intact distal pulses. Tachycardia present. Exam reveals no gallop and no friction rub.   No murmur heard.  Pulmonary/Chest: No stridor. She has no decreased breath sounds. She has no wheezes. She has no rhonchi. She has no rales.   Abdominal: Soft. Bowel sounds are normal. She exhibits no distension. There is no tenderness.   Musculoskeletal: Normal range of motion. She exhibits no edema, tenderness or deformity.   Neurological:   Opens eyes to voice, withdraws to pain, not following commands   Skin: Skin is warm and dry. No rash noted. She is not diaphoretic. No erythema. No pallor.   Nursing note and vitals reviewed.        Vital Signs (Most Recent):  Temp: (!) 100.7 °F (38.2 °C) (11/08/18 1101)  Pulse: 107 (11/08/18 1315)  Resp: 19 (11/08/18 1315)  BP: (!) 142/78 (11/08/18 1300)  SpO2: 100 % (11/08/18 1315) Vital Signs (24h Range):  Temp:  [97.9 °F (36.6 °C)-100.7 °F (38.2 °C)] 100.7 °F (38.2 °C)  Pulse:  [] 107  Resp:  [8-42] 19  SpO2:  [93 %-100 %] 100 %  BP: (113-161)/(64-90) 142/78     Weight: 57 kg (125 lb 10.6 oz)  Body mass index is 23.74 kg/m².      Intake/Output Summary (Last 24 hours) at 11/8/2018 1351  Last data filed at 11/8/2018 1200  Gross per 24 hour   Intake 4792 ml   Output 3413 ml   Net 1379 ml       Ventilator Data:          Hemodynamic Parameters:       Lines/Drains:       Port A Cath Single Lumen right subclavian (Active)   Dressing Type Transparent 11/8/2018 11:15 AM   Dressing Status Clean;Dry;Intact 11/8/2018 11:15 AM   Dressing Intervention Dressing reinforced 11/8/2018  3:05 AM   Dressing Change Due 10/06/18 10/4/2018  8:00 PM   Line Status Infusing 11/8/2018 11:15 AM   Flush Performed Yes 11/8/2018 11:15 AM   Date to be Reflushed 10/04/18 10/3/2018   8:00 PM   Daily Line Review Performed 11/8/2018 11:15 AM   Type of Needle Nicole 11/8/2018 11:15 AM   Gauge 20 11/8/2018 11:15 AM   Needle Length 3/4 in 10/1/2018  8:00 AM   Needle Status Left in place 11/8/2018 11:15 AM   Needle Insertion Date 09/29/18 10/1/2018  8:00 AM   Needle Insertion Time 1800 10/1/2018  8:00 AM   Needle Removal Date 09/29/18 9/29/2018 11:00 AM   Needle Removal Time 1136 9/29/2018 11:00 AM   Number of days:             Trialysis (Dialysis) Catheter 11/07/18 1023 right femoral (Active)   IV Device Securement sutures 11/8/2018 11:15 AM   Additional Site Signs no drainage;no streak formation;no palpable cord;no pain;no edema;no warmth;no erythema 11/8/2018 11:15 AM   Patency/Care flushed w/o difficulty 11/8/2018 11:15 AM   Site Assessment Clean;Dry;Intact;No redness;No swelling 11/8/2018 11:15 AM   Status Accessed 11/8/2018 11:15 AM   Flows Good 11/8/2018 11:15 AM   Dressing Intervention Dressing reinforced 11/8/2018  3:05 AM   Dressing Status Clean;Dry;Intact;Biopatch in place 11/8/2018 11:15 AM   Dressing Change Due 11/14/18 11/8/2018 11:15 AM   Verification by X-ray Yes 11/7/2018 10:30 AM   Site Condition No complications 11/8/2018 11:15 AM   Dressing Occlusive 11/8/2018 11:15 AM   Daily Line Review Performed 11/8/2018 11:15 AM   Number of days: 1            Peripheral IV - Single Lumen 11/07/18 0200 Right Hand (Active)   Site Assessment Clean;Dry;Intact;No redness;No swelling 11/8/2018 11:15 AM   Line Status Saline locked 11/8/2018 11:15 AM   Dressing Status Intact;Dry;Clean 11/8/2018 11:15 AM   Dressing Intervention New dressing 11/8/2018  3:05 AM   Dressing Change Due 11/11/18 11/8/2018  3:05 AM   Site Change Due 11/11/18 11/8/2018 11:15 AM   Reason Not Rotated Not due 11/8/2018 11:15 AM   Number of days: 1            Urethral Catheter 11/07/18 0300 (Active)   Site Assessment Clean;Intact 11/8/2018 11:15 AM   Collection Container Urimeter 11/8/2018 11:15 AM   Securement Method secured to  "top of thigh w/ adhesive device 11/8/2018 11:15 AM   Catheter Care Performed yes 11/8/2018 11:15 AM   Reason for Continuing Urinary Catheterization Critically ill in ICU requiring intensive monitoring 11/8/2018 11:15 AM   CAUTI Prevention Bundle StatLock in place w 1" slack;Intact seal between catheter & drainage tubing;Drainage bag off the floor;Green sheeting clip in use;No dependent loops or kinks;Drainage bag not overfilled (<2/3 full);Drainage bag below bladder 11/8/2018 11:15 AM   Output (mL) 5 mL 11/8/2018 12:00 PM   Number of days: 1       Significant Labs:  CBC:  Recent Labs   Lab 11/08/18  0409   WBC 4.62   RBC 2.61*   HGB 7.1*   HCT 23.7*   PLT 70*   MCV 91   MCH 27.2   MCHC 30.0*     BMP:  Recent Labs   Lab 11/08/18  0409     145   K 4.1  4.1     103   CO2 31*  31*   BUN 5*  5*   CREATININE 1.1  1.1   CALCIUM 6.9*  6.9*      Tacrolimus Levels:  Recent Labs   Lab 11/07/18  0503   TACROLIMUS 4.6*     Microbiology:  Microbiology Results (last 7 days)     Procedure Component Value Units Date/Time    Fungus Culture, Blood or Bone Marrow [[id number]] Collected:  11/07/18 1715    Order Status:  Completed Specimen:  Blood Updated:  11/08/18 1323     Fungus Cult, blood or BM Culture in progress    Respiratory Viral Panel by PCR [facility]; Nasal Swab [[id number]] Collected:  11/07/18 0346    Order Status:  Completed Specimen:  Respiratory Updated:  11/08/18 1212     Respiratory Virus Panel, source Nasal Swab     RVP - Adenovirus Not Detected     Comment: Detects Serotypes B and E. Detection of Serotype C may   be limited. If Adenovirus infection is suspected and a   Not Detected result is returned the sample should be   re-tested for Adenovirus using an independent method  (e.g. [employer] Adenovirus Quantitative Real-Time  PCR test.          Enterovirus Not Detected     Comment: Cross-reactivity has been observed between certain Rhinovirus  strains and the Enterovirus assay.          Human " Bocavirus Not Detected     Human Coronavirus Not Detected     Comment: The Human Coronavirus assay detects Human coronavirus types  229E, OC43,NL63 and HKU1.          RVP - Human Metapneumovirus (hMPV) Not Detected     RVP - Influenza A Not Detected     Influenza A - [id number] Not Detected     RVP - Influenza B Not Detected     Parainfluenza Not Detected     Respiratory Syncytial VirusVirus (RSV) A Not Detected     Comment: The Respiratory Syncytial Viral assay detects types A and B,  however it does not distinguish between the two.          RVP - Rhinovirus Not Detected     Comment: Cross-Reactivity has been observed between certain   Rhinovirus strains and the Enterovirus assay.  Target Enriched Mulitplex Polymerase Chain Reaction (TEM-PCR)  allows for the detection of multiple pathogens out of a single  reaction.  This test was developed and its performance   characteristics determined by [employer].  It has not   been cleared or approved by the U.[place].Food and Drug Administration.  Results should be used in conjunction with clinical findings,   and should not form the sole basis for a diagnosis or treatment  decision.  TEM-PCR is a licensed technology of [employer].         Narrative:       [facility]    Blood culture [[id number]] Collected:  11/07/18 0252    Order Status:  Completed Specimen:  Blood from Peripheral, Wrist, Right Updated:  11/08/18 0613     Blood Culture, Routine No Growth to date     Blood Culture, Routine No Growth to date    Blood culture [[id number]] Collected:  11/07/18 0200    Order Status:  Completed Specimen:  Blood from Peripheral, Antecubital, Right Updated:  11/08/18 0613     Blood Culture, Routine No Growth to date     Blood Culture, Routine No Growth to date    Culture, Respiratory with Gram Stain [[id number]]     Order Status:  No result Specimen:  Respiratory from Sputum, Expectorated           I have reviewed all pertinent labs within the past 24  hours.    Diagnostic Results:  Chest X-Ray: I personally reviewed the films and findings are: no acute process

## 2018-11-08 NOTE — ASSESSMENT & PLAN NOTE
Patient presented with AMS with LISBETH, severe anion gap metabolic acidosis, hypotension and hyperkalemia. Placed on empiric Vanc/Zosyn upon admission. Patient's  reporting headaches and poor PO intake prior to admission. Urine and blood cultures negative. Concerns for sepsis from unknown source. Patient febrile this afternoon with Tmax 100.6.     Will start empiric acyclovir for viral meningitis. Plan for LP after head CT is obtained. Continue supportive care. ID following, appreciate recs.

## 2018-11-08 NOTE — ASSESSMENT & PLAN NOTE
Patient presented with AMS with LISBETH, severe anion gap metabolic acidosis, hypotension and hyperkalemia. Patient's  reporting headaches and poor PO intake prior to admission. Placed on empiric Vanc/Zosyn upon admission. Urine and blood cultures negative. Concerns for sepsis from unknown source. Transitioned to Acyclovir/Cefepime on 11/8 due to concerns for viral meningitis and for increased CNS penetration. Tmax 100.7 within the last 24 hours.     Empiric acyclovir started for viral meningitis. CT Head 11/8 with no acute process or ICH noted. Will plan for LP at 1500 on 11/9. Cultures and cell counts ordered.  Continue supportive care. Continuing to avoid all narcotic and benzodiazepine medications to avoid further delirium. ID following, appreciate recs.

## 2018-11-08 NOTE — SUBJECTIVE & OBJECTIVE
Interval History: Patient tolerated SLED treatment well overnight.  ml/hr. Electrolytes and acid/base improved since yesterday. SCr 1.1, decreased from 6.1. BP stable, not on pressors.   Patient still appearing lethargic during assessment, following some direct commands. PCO2 56.6 on AM VBG. Urine output still low, 355 ml/224hr.       Review of patient's allergies indicates:   Allergen Reactions    Albuterol Palpitations    Colistin Anaphylaxis    Vancomycin analogues      Infusion reaction that does not resolve with slowing    Neupogen [filgrastim] Other (See Comments)     Ostealgia after five daily doses of 300 mcg.      Bactrim [sulfamethoxazole-trimethoprim] Hives    Ceftazidime Hives     Pt stated can tolerate cefapine not ceftazidime    Ceftazidime     Dronabinol Other (See Comments)     Mental changes/hallucinations    Haldol [haloperidol lactate] Other (See Comments)     Seizure like activity    Nsaids (non-steroidal anti-inflammatory drug)      Cannot have due to lung transplant    Adhesive Rash     Cloth tape- please use tegaderm or paper tape    Aztreonam Rash    Ciprofloxacin Nausea And Vomiting     Projectile N/V, per patient.  Unwilling to retry therapy.     Current Facility-Administered Medications   Medication Frequency    0.9%  NaCl infusion (CRRT USE ONLY) Continuous    acetaminophen tablet 1,000 mg Q6H PRN    calcium gluconate 1,000 mg in dextrose 5 % 100 mL IVPB Once    fluticasone-vilanterol 100-25 mcg/dose diskus inhaler 1 puff Daily    levalbuterol nebulizer solution 1.25 mg Q8H PRN    magnesium sulfate 2g in water 50mL IVPB (premix) PRN    And    magnesium sulfate 2g in water 50mL IVPB (premix) PRN    magnesium sulfate 2g in water 50mL IVPB (premix) PRN    naloxone 0.4 mg/mL injection 0.4 mg PRN    ondansetron injection 8 mg Q6H PRN    pantoprazole injection 40 mg Daily    piperacillin-tazobactam 4.5 g in sodium chloride 0.9% 100 mL IVPB (ready to mix system)  Q12H    potassium chloride 10% oral solution 40 mEq PRN    And    potassium chloride 10% oral solution 40 mEq PRN    And    potassium chloride 10% oral solution 60 mEq PRN    potassium phosphate 20 mmol in dextrose 5 % 500 mL infusion PRN    tiZANidine tablet 8 mg Q6H PRN    vancomycin 750 mg in dextrose 5 % 250 mL IVPB (ready to mix system) Q24H       Objective:     Vital Signs (Most Recent):  Temp: 98.5 °F (36.9 °C) (11/08/18 0701)  Pulse: 91 (11/08/18 0900)  Resp: 11 (11/08/18 0900)  BP: (!) 155/90 (11/08/18 0900)  SpO2: 100 % (11/08/18 0900)  O2 Device (Oxygen Therapy): nasal cannula (11/08/18 0817) Vital Signs (24h Range):  Temp:  [97.9 °F (36.6 °C)-99.1 °F (37.3 °C)] 98.5 °F (36.9 °C)  Pulse:  [] 91  Resp:  [8-42] 11  SpO2:  [93 %-100 %] 100 %  BP: (113-161)/(64-91) 155/90     Weight: 57 kg (125 lb 10.6 oz) (11/07/18 0430)  Body mass index is 23.74 kg/m².  Body surface area is 1.57 meters squared.    I/O last 3 completed shifts:  In: 4873.3 [I.V.:4873.3]  Out: 3569 [Urine:655; Other:2914]    Physical Exam   Constitutional: She appears well-developed. She appears lethargic. She is uncooperative. She has a sickly appearance. She appears ill. No distress. Nasal cannula in place.   Alert at times, but not following direct commands.    HENT:   Head: Normocephalic and atraumatic.   Mouth/Throat: No oropharyngeal exudate.   Eyes: Right eye exhibits no discharge. Left eye exhibits no discharge.   Neck: Normal range of motion. Neck supple. No JVD present.   Cardiovascular: Normal rate, regular rhythm, normal heart sounds and intact distal pulses.   No murmur heard.  Pulmonary/Chest: Effort normal and breath sounds normal. She has no wheezes. She has no rhonchi. She has no rales.   Abdominal: Soft. Bowel sounds are normal. She exhibits no distension. There is no tenderness. There is no guarding.   Musculoskeletal: Normal range of motion. She exhibits no edema.   Neurological: She appears lethargic. No  cranial nerve deficit.   Somnolent during assessment    Skin: Skin is warm and dry. No rash noted. She is not diaphoretic. No erythema. There is pallor.   Psychiatric: Her speech is normal. Her mood appears anxious. She exhibits a depressed mood.   Nursing note and vitals reviewed.      Significant Labs:  ABGs:   Recent Labs   Lab 11/08/18  0446   PH 7.397   PCO2 56.6*   HCO3 34.8*   POCSATURATED 65*   BE 10     CBC:   Recent Labs   Lab 11/08/18  0409   WBC 4.62   RBC 2.61*   HGB 7.1*   HCT 23.7*   PLT 70*   MCV 91   MCH 27.2   MCHC 30.0*     CMP:   Recent Labs   Lab 11/07/18  0312  11/08/18  0409   *   < > 115*  115*   CALCIUM 8.0*   < > 6.9*  6.9*   ALBUMIN 3.2*   < > 2.7*   PROT 6.0  --   --       < > 145  145   K 6.5*   < > 4.1  4.1   CO2 9*   < > 31*  31*   *   < > 103  103   BUN 54*   < > 5*  5*   CREATININE 6.1*   < > 1.1  1.1   ALKPHOS 166*  --   --    ALT 17  --   --    AST 18  --   --    BILITOT 0.3  --   --     < > = values in this interval not displayed.

## 2018-11-08 NOTE — PROGRESS NOTES
[facility]  Nephrology  Progress Note    Patient Name: Juanita Ibarra  MRN: [id number]  Admission Date: 11/7/2018  Hospital Length of Stay: 1 days  Attending Provider: Francisca Morin DO   Primary Care Physician: Jake Alvarez MD  Principal Problem:<principal problem not specified>    Subjective:     HPI: The patient is a  28 year-old  female with a medical history of BLT secondary to CF. 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 Patient presents as a transfer from [facility] in [place] on 11/7 after presenting there with a 3 day history of lethargy and dizziness per spouse. The patient has a history of multiple hospitalizations 2/2 PNA and/or dyspnea over the last couple of months. The patient was somnolent during assessment, therefore, majority of HPI is from her spouse and records. The spouse states that the patient had been feeling nauseated for the last 3 days. He states that she also stopped eating during that time. Despite her decreased appetite, the spouse continued to give her all of her prescribed medications medications including her Morphine as normal. On admit to the OSH, her intial BP was 83/52, , and SPO2 was 95% on ?room air. According to the records, she also had a ?LISBETH and was hyperkalemic. She was given calcium gluconate, insulin, D50, and Kayexalate at the OSH. The patient was also given a total of 4 L of ?IVFs for her BP and was started on Zosyn. She was then transferred [place] [facility] [place] care team overnight for an higher level of care. She was started on a bicarb gtt 2/2 severe metabolic acidosis and narcan drip 2/2 hypercapnic respiratory failure. ABG on admit was 6.9/59 and her bicarb was 12. ABG has improved to 7.2/48.6 and bicarb to 21.8 since being on bicarb drip. Patient on 2  "L via NC.  The patient was hyperkalemic, 6.5, on admit, and was shifted. Potasium 5.1 after medications. Nephrology consulted for LISBETH and severe metabolic acidosis. The patient's spouse denies a history of kidney issues. Her baseline SCr from 2015 to 2018 has been around ~ 1.0. Her last appointment visits in October revealed a SCr of 1.5 and 1.8. The patient's  did state that a couple of months ago the patient was found to have a LISBETH during a outpatient appointment in [place]. He states that she was sent to ED and was given fluids and discharged home. He denied any issues around that time leading to her LISBETH and states it was "randomly" found on labs. The patient was admitted with a SCr of 6.1. Urine output has been between 10-30 ml/hr. She put out a total of 300 ml during night shift.     Nephrology consulted for management of LISBETH and metabolic acidosis.         Interval History: Patient tolerated SLED treatment well overnight.  ml/hr. Electrolytes and acid/base improved since yesterday. SCr 1.1, decreased from 6.1. BP stable, not on pressors.   Patient still appearing lethargic during assessment, following some direct commands. PCO2 56.6 on AM VBG. Urine output still low, 355 ml/224hr.       Review of patient's allergies indicates:   Allergen Reactions    Albuterol Palpitations    Colistin Anaphylaxis    Vancomycin analogues      Infusion reaction that does not resolve with slowing    Neupogen [filgrastim] Other (See Comments)     Ostealgia after five daily doses of 300 mcg.      Bactrim [sulfamethoxazole-trimethoprim] Hives    Ceftazidime Hives     Pt stated can tolerate cefapine not ceftazidime    Ceftazidime     Dronabinol Other (See Comments)     Mental changes/hallucinations    Haldol [haloperidol lactate] Other (See Comments)     Seizure like activity    Nsaids (non-steroidal anti-inflammatory drug)      Cannot have due to lung transplant    Adhesive Rash     Cloth tape- please use " tegaderm or paper tape    Aztreonam Rash    Ciprofloxacin Nausea And Vomiting     Projectile N/V, per patient.  Unwilling to retry therapy.     Current Facility-Administered Medications   Medication Frequency    0.9%  NaCl infusion (CRRT USE ONLY) Continuous    acetaminophen tablet 1,000 mg Q6H PRN    calcium gluconate 1,000 mg in dextrose 5 % 100 mL IVPB Once    fluticasone-vilanterol 100-25 mcg/dose diskus inhaler 1 puff Daily    levalbuterol nebulizer solution 1.25 mg Q8H PRN    magnesium sulfate 2g in water 50mL IVPB (premix) PRN    And    magnesium sulfate 2g in water 50mL IVPB (premix) PRN    magnesium sulfate 2g in water 50mL IVPB (premix) PRN    naloxone 0.4 mg/mL injection 0.4 mg PRN    ondansetron injection 8 mg Q6H PRN    pantoprazole injection 40 mg Daily    piperacillin-tazobactam 4.5 g in sodium chloride 0.9% 100 mL IVPB (ready to mix system) Q12H    potassium chloride 10% oral solution 40 mEq PRN    And    potassium chloride 10% oral solution 40 mEq PRN    And    potassium chloride 10% oral solution 60 mEq PRN    potassium phosphate 20 mmol in dextrose 5 % 500 mL infusion PRN    tiZANidine tablet 8 mg Q6H PRN    vancomycin 750 mg in dextrose 5 % 250 mL IVPB (ready to mix system) Q24H       Objective:     Vital Signs (Most Recent):  Temp: 98.5 °F (36.9 °C) (11/08/18 0701)  Pulse: 91 (11/08/18 0900)  Resp: 11 (11/08/18 0900)  BP: (!) 155/90 (11/08/18 0900)  SpO2: 100 % (11/08/18 0900)  O2 Device (Oxygen Therapy): nasal cannula (11/08/18 0817) Vital Signs (24h Range):  Temp:  [97.9 °F (36.6 °C)-99.1 °F (37.3 °C)] 98.5 °F (36.9 °C)  Pulse:  [] 91  Resp:  [8-42] 11  SpO2:  [93 %-100 %] 100 %  BP: (113-161)/(64-91) 155/90     Weight: 57 kg (125 lb 10.6 oz) (11/07/18 0430)  Body mass index is 23.74 kg/m².  Body surface area is 1.57 meters squared.    I/O last 3 completed shifts:  In: 4873.3 [I.V.:4873.3]  Out: 3569 [Urine:655; Other:2914]    Physical Exam   Constitutional: She  appears well-developed. She appears lethargic. She is uncooperative. She has a sickly appearance. She appears ill. No distress. Nasal cannula in place.   Alert at times, but not following direct commands.    HENT:   Head: Normocephalic and atraumatic.   Mouth/Throat: No oropharyngeal exudate.   Eyes: Right eye exhibits no discharge. Left eye exhibits no discharge.   Neck: Normal range of motion. Neck supple. No JVD present.   Cardiovascular: Normal rate, regular rhythm, normal heart sounds and intact distal pulses.   No murmur heard.  Pulmonary/Chest: Effort normal and breath sounds normal. She has no wheezes. She has no rhonchi. She has no rales.   Abdominal: Soft. Bowel sounds are normal. She exhibits no distension. There is no tenderness. There is no guarding.   Musculoskeletal: Normal range of motion. She exhibits no edema.   Neurological: She appears lethargic. No cranial nerve deficit.   Somnolent during assessment    Skin: Skin is warm and dry. No rash noted. She is not diaphoretic. No erythema. There is pallor.   Psychiatric: Her speech is normal. Her mood appears anxious. She exhibits a depressed mood.   Nursing note and vitals reviewed.      Significant Labs:  ABGs:   Recent Labs   Lab 11/08/18  0446   PH 7.397   PCO2 56.6*   HCO3 34.8*   POCSATURATED 65*   BE 10     CBC:   Recent Labs   Lab 11/08/18  0409   WBC 4.62   RBC 2.61*   HGB 7.1*   HCT 23.7*   PLT 70*   MCV 91   MCH 27.2   MCHC 30.0*     CMP:   Recent Labs   Lab 11/07/18  0312  11/08/18  0409   *   < > 115*  115*   CALCIUM 8.0*   < > 6.9*  6.9*   ALBUMIN 3.2*   < > 2.7*   PROT 6.0  --   --       < > 145  145   K 6.5*   < > 4.1  4.1   CO2 9*   < > 31*  31*   *   < > 103  103   BUN 54*   < > 5*  5*   CREATININE 6.1*   < > 1.1  1.1   ALKPHOS 166*  --   --    ALT 17  --   --    AST 18  --   --    BILITOT 0.3  --   --     < > = values in this interval not displayed.            Assessment/Plan:     Acute kidney failure    The  patient is a  28 year-old  female with medical history of BLT secondary to CF. 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 Patient presents as a transfer from [facility] in [place] on 11/7 after presenting there with a 3 day history of lethargy and dizziness per spouse. [place] [facility] [place]. She was started on a bicarb gtt 2/2 severe metabolic acidosis and narcan drip 2/2 hypercapnic respiratory failure. ABG on admit was 6.9/59 and her bicarb was 12. ABG improved to 7.2/48.6 and bicarb to 21.8 since being on bicarb drip. Patient on 2 L via NC. The patient was hyperkalemic, 6.5, on admit, and was shifted. Potasium 5.1 after medications. Nephrology consulted for LISBETH and severe metabolic acidosis. The patient's spouse denies a history of kidney issues. Her baseline SCr from 2015 to 2018 has been around ~ 1.0. Her appointment visits in October revealed a SCr of 1.5 and 1.8. The patient was admitted with a SCr of 6.1. Urine outputt has been between 10-30 ml/hr. She put out a total of 300 ml during night shift. UA today with 2+ protein. UPCR pending. LISBETH secondary to ATN likely caused by hypotension/sepsis in the setting of severe metabolic acidosis.     No urgent need for continual SLED at this time  SLED overnight for metabolic clearance, No UF. Patient tolerated well. Electrolytes and acid base improved. VBG 7.3/56.6. Bicarb 34.8.  Patient still legarthric during AM assessment, following some commands. PCO2 56.6, unsure of patient's baseline value   Maintain urinary catheter for strict I/Os. Low UOP, 355 ml/24h  Renal US completed on 11/7, showing reduced perfusion bilaterally   Urine creatinine/protein ratio was 0.51  Avoid NSAIDs, contrast, nephrotoxins   Monitor strict I/Os, daily weights  Renally dose medications  to current GFR  Will discuss with staff            Case discussed with staff further recs with attestation.     I will follow-up with patient. Please contact us if you have any additional questions.    GUSTABO Lunsford DNP, APRN, FNP-C  Department of Nephrology  [facility]  Pager: [phone]

## 2018-11-09 NOTE — PROCEDURES
Radiology Post-Procedure Note    Pre Op Diagnosis: encephalopathy    Post Op Diagnosis: Same    Procedure: lumbar puncture    Procedure performed by: Balaji Schmitz MD    Written Informed Consent Obtained: Yes    Specimen Removed: 12 mL CSF    Estimated Blood Loss: Minimal    Findings: Following written informed consent and sterile prep and drape, a 22 gauge spinal needle was inserted at L3-L4 intralaminar space under fluoroscopic surveillance.  3 mL clear CSF removed before becoming bloody. Stylet was reinserted and needle was removed. A 22 gauge spinal needle was then introducted at the L2-L3 intralaminar space and a further 9 mL clear CSF was removed and sent to the lab for further analysis.  There were no complications.    Patient tolerated procedure well.    Balaji Schmitz MD  Radiology PGY-3  [phone]

## 2018-11-09 NOTE — PLAN OF CARE
Patient disoriented to place, situation, and time. Follows simple commands- able to squeeze hands with yoana hands. HR 90s-100s. MAP >65. SPO2> 93% with 2l NC. Zofran given for nausea. Patient repositioned q2h with pillows and wedge. Extremities elevated with pillows. Patient had three bowel movement this shift. Safety precautions in placed. No new skin break down noted. Dr. Raya notified regarding morning labs. Orders pending. Will continue to monitor patient. See flowsheet for assessment and detials.

## 2018-11-09 NOTE — PROGRESS NOTES
"[facility]  Lung Transplant  Progress Note - Critical Care    Patient Name: Juanita Ibarra  MRN: [id number]  Admission Date: 11/7/2018  Hospital Length of Stay: 2 days  Post-Operative Day: 1611  Attending Physician: Francisca Morin DO  Primary Care Provider: Jake Alvarez MD     Subjective:     Interval History: No acute events overnight. Patient noted to be restless throughout night and was sleeping until 0800 this morning. Patient has remained awake and sitting up for most of the day. Patient able to follow simple commands but remains alert to self only. Patient expressing pain by saying "ow" throughout much of the day but is unable to pinpoint location of pain.  1 unit PRBCs transfused for H/H 6.4/21.6.  and family member at bedside for much of the day. Will plan for LP at 1500 this afternoon.     Continuous Infusions:    Scheduled Meds:   acyclovir (ZOVIRAX) IVPB (wt 35 - 70 kg)  5 mg/kg Intravenous Daily    ceFEPime (MAXIPIME) IVPB  1 g Intravenous Daily    diphenhydrAMINE  50 mg Intravenous Once    fluticasone-vilanterol  1 puff Inhalation Daily    [START ON 11/10/2018] methylPREDNISolone sodium succinate  10 mg Intravenous Daily    mycophenolate (CELLCEPT) IVPB  500 mg Intravenous Q12H    pantoprozole (PROTONIX) IV  40 mg Intravenous Daily    tacrolimus  1 mg Sublingual BID     PRN Meds:sodium chloride, acetaminophen, levalbuterol, magnesium sulfate IVPB **AND** magnesium sulfate IVPB, naloxone, ondansetron, potassium chloride 10% **AND** potassium chloride 10% **AND** potassium chloride 10%, potassium phosphate IVPB, promethazine, tiZANidine    Review of patient's allergies indicates:   Allergen Reactions    Albuterol Palpitations    Colistin Anaphylaxis    Vancomycin analogues      Infusion reaction that does not resolve with slowing    Neupogen [filgrastim] Other (See Comments)     Ostealgia after five daily doses of 300 mcg.      Bactrim " [sulfamethoxazole-trimethoprim] Hives    Ceftazidime Hives     Pt stated can tolerate cefapine not ceftazidime    Ceftazidime     Dronabinol Other (See Comments)     Mental changes/hallucinations    Haldol [haloperidol lactate] Other (See Comments)     Seizure like activity    Nsaids (non-steroidal anti-inflammatory drug)      Cannot have due to lung transplant    Adhesive Rash     Cloth tape- please use tegaderm or paper tape    Aztreonam Rash    Ciprofloxacin Nausea And Vomiting     Projectile N/V, per patient.  Unwilling to retry therapy.       Review of Systems   Unable to perform ROS: Acuity of condition     Objective:   Physical Exam   Constitutional: She appears well-developed and well-nourished.  Non-toxic appearance. She does not have a sickly appearance. She appears ill. She appears distressed.   HENT:   Head: Atraumatic.   Nose: Nose normal.   Round facies   Eyes: Conjunctivae and EOM are normal. No scleral icterus.   Neck: Normal range of motion. Neck supple. No JVD present. No tracheal deviation present.   Cardiovascular: Regular rhythm, normal heart sounds and intact distal pulses. Tachycardia present. Exam reveals no gallop and no friction rub.   No murmur heard.  Pulmonary/Chest: Effort normal. No stridor. She has no decreased breath sounds. She has no wheezes. She has no rhonchi. She has no rales.   Abdominal: Soft. Bowel sounds are normal. She exhibits no distension. There is no tenderness.   Musculoskeletal: Normal range of motion. She exhibits no edema, tenderness or deformity.   Neurological:   Awake and alert. Oriented to self. Follows simple commands. Moves all extremities.    Skin: Skin is warm and dry. No rash noted. She is not diaphoretic. No erythema. No pallor.   Psychiatric:   Unable to be determined due to mental status   Nursing note and vitals reviewed.        Vital Signs (Most Recent):  Temp: 98.7 °F (37.1 °C) (11/09/18 1137)  Pulse: 91 (11/09/18 1300)  Resp: (!) 35 (11/09/18  1300)  BP: (!) 130/98 (11/09/18 1300)  SpO2: (!) 93 % (11/09/18 1300) Vital Signs (24h Range):  Temp:  [98.5 °F (36.9 °C)-100.2 °F (37.9 °C)] 98.7 °F (37.1 °C)  Pulse:  [] 91  Resp:  [11-39] 35  SpO2:  [87 %-100 %] 93 %  BP: (130-160)/(65-99) 130/98     Weight: 60.7 kg (133 lb 13.1 oz)  Body mass index is 25.28 kg/m².      Intake/Output Summary (Last 24 hours) at 11/9/2018 1324  Last data filed at 11/9/2018 1306  Gross per 24 hour   Intake 467.16 ml   Output 220 ml   Net 247.16 ml       Ventilator Data:     Oxygen Concentration (%):  [28] 28    Hemodynamic Parameters:       Lines/Drains:       Port A Cath Single Lumen right subclavian (Active)   Dressing Type Transparent 11/8/2018 11:15 AM   Dressing Status Clean;Dry;Intact 11/8/2018 11:15 AM   Dressing Intervention Dressing reinforced 11/8/2018  3:05 AM   Dressing Change Due 10/06/18 10/4/2018  8:00 PM   Line Status Infusing 11/8/2018 11:15 AM   Flush Performed Yes 11/8/2018 11:15 AM   Date to be Reflushed 10/04/18 10/3/2018  8:00 PM   Daily Line Review Performed 11/8/2018 11:15 AM   Type of Needle Nicole 11/8/2018 11:15 AM   Gauge 20 11/8/2018 11:15 AM   Needle Length 3/4 in 10/1/2018  8:00 AM   Needle Status Left in place 11/8/2018 11:15 AM   Needle Insertion Date 09/29/18 10/1/2018  8:00 AM   Needle Insertion Time 1800 10/1/2018  8:00 AM   Needle Removal Date 09/29/18 9/29/2018 11:00 AM   Needle Removal Time 1136 9/29/2018 11:00 AM   Number of days:             Trialysis (Dialysis) Catheter 11/07/18 1023 right femoral (Active)   IV Device Securement sutures 11/8/2018 11:15 AM   Additional Site Signs no drainage;no streak formation;no palpable cord;no pain;no edema;no warmth;no erythema 11/8/2018 11:15 AM   Patency/Care flushed w/o difficulty 11/8/2018 11:15 AM   Site Assessment Clean;Dry;Intact;No redness;No swelling 11/8/2018 11:15 AM   Status Accessed 11/8/2018 11:15 AM   Flows Good 11/8/2018 11:15 AM   Dressing Intervention Dressing reinforced 11/8/2018   "3:05 AM   Dressing Status Clean;Dry;Intact;Biopatch in place 11/8/2018 11:15 AM   Dressing Change Due 11/14/18 11/8/2018 11:15 AM   Verification by X-ray Yes 11/7/2018 10:30 AM   Site Condition No complications 11/8/2018 11:15 AM   Dressing Occlusive 11/8/2018 11:15 AM   Daily Line Review Performed 11/8/2018 11:15 AM   Number of days: 1            Peripheral IV - Single Lumen 11/07/18 0200 Right Hand (Active)   Site Assessment Clean;Dry;Intact;No redness;No swelling 11/8/2018 11:15 AM   Line Status Saline locked 11/8/2018 11:15 AM   Dressing Status Intact;Dry;Clean 11/8/2018 11:15 AM   Dressing Intervention New dressing 11/8/2018  3:05 AM   Dressing Change Due 11/11/18 11/8/2018  3:05 AM   Site Change Due 11/11/18 11/8/2018 11:15 AM   Reason Not Rotated Not due 11/8/2018 11:15 AM   Number of days: 1            Urethral Catheter 11/07/18 0300 (Active)   Site Assessment Clean;Intact 11/8/2018 11:15 AM   Collection Container Urimeter 11/8/2018 11:15 AM   Securement Method secured to top of thigh w/ adhesive device 11/8/2018 11:15 AM   Catheter Care Performed yes 11/8/2018 11:15 AM   Reason for Continuing Urinary Catheterization Critically ill in ICU requiring intensive monitoring 11/8/2018 11:15 AM   CAUTI Prevention Bundle StatLock in place w 1" slack;Intact seal between catheter & drainage tubing;Drainage bag off the floor;Green sheeting clip in use;No dependent loops or kinks;Drainage bag not overfilled (<2/3 full);Drainage bag below bladder 11/8/2018 11:15 AM   Output (mL) 5 mL 11/8/2018 12:00 PM   Number of days: 1       Significant Labs:  CBC:  Recent Labs   Lab 11/09/18  0345   WBC 4.72   RBC 2.37*   HGB 6.4*   HCT 21.6*   PLT 57*   MCV 91   MCH 27.0   MCHC 29.6*     BMP:  Recent Labs   Lab 11/09/18  0345      K 4.0      CO2 31*   BUN 9   CREATININE 2.6*   CALCIUM 7.8*      Tacrolimus Levels:  Recent Labs   Lab 11/07/18  0503   TACROLIMUS 4.6*     Microbiology:  Microbiology Results (last 7 days)     " Procedure Component Value Units Date/Time    Gram stain [[id number]]     Order Status:  No result Specimen:  CSF (Spinal Fluid) from CSF Tap, Tube 3     CSF culture [[id number]]     Order Status:  No result Specimen:  CSF (Spinal Fluid) from CSF Tap, Tube 3     Cryptococcal antigen, CSF [[id number]]     Order Status:  No result Specimen:  CSF (Spinal Fluid) from CSF Tap, Tube 3     Fungus culture [[id number]]     Order Status:  No result Specimen:  CSF (Spinal Fluid) from CSF Tap, Tube 3     Blood culture [[id number]] Collected:  11/07/18 0200    Order Status:  Completed Specimen:  Blood from Peripheral, Antecubital, Right Updated:  11/09/18 0613     Blood Culture, Routine No Growth to date     Blood Culture, Routine No Growth to date     Blood Culture, Routine No Growth to date    Blood culture [[id number]] Collected:  11/07/18 0252    Order Status:  Completed Specimen:  Blood from Peripheral, Wrist, Right Updated:  11/09/18 0613     Blood Culture, Routine No Growth to date     Blood Culture, Routine No Growth to date     Blood Culture, Routine No Growth to date    Cryptococcal antigen [[id number]] Collected:  11/08/18 1641    Order Status:  Sent Specimen:  Blood Updated:  11/08/18 1711    Fungus Culture, Blood or Bone Marrow [[id number]] Collected:  11/07/18 1715    Order Status:  Completed Specimen:  Blood Updated:  11/08/18 1323     Fungus Cult, blood or BM Culture in progress    Respiratory Viral Panel by PCR [facility]; Nasal Swab [[id number]] Collected:  11/07/18 0346    Order Status:  Completed Specimen:  Respiratory Updated:  11/08/18 1212     Respiratory Virus Panel, source Nasal Swab     RVP - Adenovirus Not Detected     Comment: Detects Serotypes B and E. Detection of Serotype C may   be limited. If Adenovirus infection is suspected and a   Not Detected result is returned the sample should be   re-tested for Adenovirus using an independent method  (e.g. [employer] Adenovirus Quantitative Real-Time  PCR test.           Enterovirus Not Detected     Comment: Cross-reactivity has been observed between certain Rhinovirus  strains and the Enterovirus assay.          Human Bocavirus Not Detected     Human Coronavirus Not Detected     Comment: The Human Coronavirus assay detects Human coronavirus types  229E, OC43,NL63 and HKU1.          RVP - Human Metapneumovirus (hMPV) Not Detected     RVP - Influenza A Not Detected     Influenza A - T9C1-56 Not Detected     RVP - Influenza B Not Detected     Parainfluenza Not Detected     Respiratory Syncytial VirusVirus (RSV) A Not Detected     Comment: The Respiratory Syncytial Viral assay detects types A and B,  however it does not distinguish between the two.          RVP - Rhinovirus Not Detected     Comment: Cross-Reactivity has been observed between certain   Rhinovirus strains and the Enterovirus assay.  Target Enriched Mulitplex Polymerase Chain Reaction (TEM-PCR)  allows for the detection of multiple pathogens out of a single  reaction.  This test was developed and its performance   characteristics determined by [employer].  It has not   been cleared or approved by the U.[place].Food and Drug Administration.  Results should be used in conjunction with clinical findings,   and should not form the sole basis for a diagnosis or treatment  decision.  TEM-PCR is a licensed technology of [employer].         Narrative:       Receiving Lab:->[facility]    Culture, Respiratory with Gram Stain [[id number]]     Order Status:  Canceled Specimen:  Respiratory from Sputum, Expectorated           I have reviewed all pertinent labs within the past 24 hours.    Diagnostic Results:  Chest X-Ray: I personally reviewed the films and findings are: no acute process        Assessment/Plan:     * Acute metabolic encephalopathy    Likely secondary to acute renal failure and hypoventilation related to opioid overdose. Received SLED 11/7 with slight improvement in mental status.     Continue  acyclovir for concern for viral meningitis. Previous HSV studies in 2013 negative. Varicella IgG positive in 2013. CT Head 11/8 negative for acute process. LP on 11/9 at 1500.        Sepsis    Patient presented with AMS with LISBETH, severe anion gap metabolic acidosis, hypotension and hyperkalemia. Patient's  reporting headaches and poor PO intake prior to admission. Placed on empiric Vanc/Zosyn upon admission. Urine and blood cultures negative. Concerns for sepsis from unknown source. Transitioned to Acyclovir/Cefepime on 11/8 due to concerns for viral meningitis and for increased CNS penetration. Tmax 100.7 within the last 24 hours.     Empiric acyclovir started for viral meningitis. CT Head 11/8 with no acute process or ICH noted. Will plan for LP at 1500 on 11/9. Cultures and cell counts ordered.  Continue supportive care. Continuing to avoid all narcotic and benzodiazepine medications to avoid further delirium. ID following, appreciate recs.      Acute kidney failure    Likely multifactorial 2/2 poor PO intake from acute illness coupled with nephrotoxic medications. Initially received 4L LR with bicarb gtt with minimal UOP. Received SLED 11/7 and discontinued 11/8 due to resolution of acidosis and hyperkalemia.     Cr 2.6 today. Will plan to resume tacrolimus 1 mg BID sublingually. Nephrology following, appreciate recs.      Lung transplant status, bilateral    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     Will plan to initiate tacrolimus sublingually, MMF, and solumedrol.      Bronchiolitis obliterans syndrome    FEV1 with persistent decline on outpatient PFTs. Currently on 2L NC. Her acute respiratory failure likely secondary to opioid overdose + acute renal failure. Received narcan x 2 en route to OM and was placed on narcan gtt with minimal improvement in  mental status. Mental status has improved following round of CRRT and sodium bicarb gtt.     Wean oxygen to maintain O2 sats >88%.      Immunosuppression    Current outpatient regimen is tacrolimus 3.5 mg BID, prednisone 5 mg daily, and  mg BID.    Will begin tacrolimus sublingually at 1 mg BID due to aspiration concerns secondary to recent mental status change. Will begin Solumedrol and MMF via IV route.      Prophylactic antibiotic    On dapsone 100 mg daily outpatient. Will hold for now.      Chronic pain with opiate use    Opioid overdose + acute renal failure from poor PO intake likely cause for acute respiratory failure and encephalopathy. Hold all sedating medications for now.     Will use IV Zofran and Phenergan at this time for relief from any opiate withdrawal symptoms. IV tylenol unavailable at this time.      CF related Pancreatic insufficiency    Previously on Creon with meals and snacks. Patient NPO for now. Will consider NG tube placement if mental status remains unchanged.          Preventive Measures: Stress Ulcer: continue prophyllaxis, Head of Bed: elevated    Counseling/Consultation:I discussed the case with Dr. Morin., I discussed the patient's condition/prognosis with the family.    Jacqueline Dumont PA-C  Lung Transplant  [facility]

## 2018-11-09 NOTE — SUBJECTIVE & OBJECTIVE
Interval History: NAEON. Electrolytes and acid/base stable this morning. SCr 2.6, urine output still poor. 175mL/24h. Hgb 6.4 today, patient to receive PRBCs per primary. BP stable, not on pressors.  Patient more alert today, following some direct commands but not at baseline. Patient head CT on 11/8 which was unremarkable and is scheduled for a LP. Low grade fever overnight, TMAX 100.7.    Review of patient's allergies indicates:   Allergen Reactions    Albuterol Palpitations    Colistin Anaphylaxis    Vancomycin analogues      Infusion reaction that does not resolve with slowing    Neupogen [filgrastim] Other (See Comments)     Ostealgia after five daily doses of 300 mcg.      Bactrim [sulfamethoxazole-trimethoprim] Hives    Ceftazidime Hives     Pt stated can tolerate cefapine not ceftazidime    Ceftazidime     Dronabinol Other (See Comments)     Mental changes/hallucinations    Haldol [haloperidol lactate] Other (See Comments)     Seizure like activity    Nsaids (non-steroidal anti-inflammatory drug)      Cannot have due to lung transplant    Adhesive Rash     Cloth tape- please use tegaderm or paper tape    Aztreonam Rash    Ciprofloxacin Nausea And Vomiting     Projectile N/V, per patient.  Unwilling to retry therapy.     Current Facility-Administered Medications   Medication Frequency    0.9%  NaCl infusion (for blood administration) Q24H PRN    acetaminophen tablet 1,000 mg Q6H PRN    acyclovir (ZOVIRAX) 290 mg in dextrose 5 % 100 mL IVPB Daily    ceFEPIme injection 1 g Daily    diphenhydrAMINE injection 50 mg Once    fluticasone-vilanterol 100-25 mcg/dose diskus inhaler 1 puff Daily    levalbuterol nebulizer solution 1.25 mg Q8H PRN    magnesium sulfate 2g in water 50mL IVPB (premix) PRN    And    magnesium sulfate 2g in water 50mL IVPB (premix) PRN    [START ON 11/10/2018] methylPREDNISolone sodium succinate injection 10 mg Daily    mycophenolate (CELLCEPT) 500 mg in dextrose 5 %  100 mL IVPB Q12H    naloxone 0.4 mg/mL injection 0.4 mg PRN    ondansetron injection 8 mg Q6H PRN    pantoprazole injection 40 mg Daily    potassium chloride 10% oral solution 40 mEq PRN    And    potassium chloride 10% oral solution 40 mEq PRN    And    potassium chloride 10% oral solution 60 mEq PRN    potassium phosphate 20 mmol in dextrose 5 % 500 mL infusion PRN    promethazine injection 25 mg Q6H PRN    tacrolimus capsule 1 mg BID    tiZANidine tablet 8 mg Q6H PRN       Objective:     Vital Signs (Most Recent):  Temp: 98.7 °F (37.1 °C) (11/09/18 1137)  Pulse: 91 (11/09/18 1300)  Resp: (!) 35 (11/09/18 1300)  BP: (!) 130/98 (11/09/18 1300)  SpO2: (!) 93 % (11/09/18 1300)  O2 Device (Oxygen Therapy): room air (11/09/18 1137) Vital Signs (24h Range):  Temp:  [98.5 °F (36.9 °C)-100.2 °F (37.9 °C)] 98.7 °F (37.1 °C)  Pulse:  [] 91  Resp:  [11-39] 35  SpO2:  [87 %-100 %] 93 %  BP: (130-160)/(65-99) 130/98     Weight: 60.7 kg (133 lb 13.1 oz) (11/09/18 0500)  Body mass index is 25.28 kg/m².  Body surface area is 1.62 meters squared.    I/O last 3 completed shifts:  In: 2457.2 [I.V.:2457.2]  Out: 2645 [Urine:295; Other:2350]    Physical Exam   Constitutional: She appears well-developed. She is uncooperative. She does not have a sickly appearance. She appears ill. No distress. Nasal cannula in place.   Alert at times, following direct commands. DAVE to assess orientation    HENT:   Head: Normocephalic and atraumatic.   Mouth/Throat: No oropharyngeal exudate.   Eyes: Right eye exhibits no discharge. Left eye exhibits no discharge.   Neck: Normal range of motion. Neck supple. No JVD present.   Cardiovascular: Normal rate, regular rhythm, normal heart sounds and intact distal pulses.   No murmur heard.  Pulmonary/Chest: Effort normal and breath sounds normal. She has no wheezes. She has no rhonchi. She has no rales.   Abdominal: Soft. Bowel sounds are normal. She exhibits no distension. There is no  tenderness. There is no guarding.   Musculoskeletal: Normal range of motion. She exhibits no edema.   Neurological: She is alert. No cranial nerve deficit.   Skin: Skin is warm and dry. No rash noted. She is not diaphoretic. No erythema. There is pallor.   Psychiatric: Her speech is normal. Her mood appears not anxious. She is slowed and withdrawn. Cognition and memory are impaired. She does not exhibit a depressed mood.   Nursing note and vitals reviewed.      Significant Labs:  ABGs:   Recent Labs   Lab 11/09/18  0815   PH 7.451*   PCO2 50.1*   HCO3 34.9*   POCSATURATED 65*   BE 11     CBC:   Recent Labs   Lab 11/09/18  0345   WBC 4.72   RBC 2.37*   HGB 6.4*   HCT 21.6*   PLT 57*   MCV 91   MCH 27.0   MCHC 29.6*     CMP:   Recent Labs   Lab 11/07/18  0312  11/09/18  0345   *   < > 133*   CALCIUM 8.0*   < > 7.8*   ALBUMIN 3.2*   < > 2.6*   PROT 6.0  --   --       < > 145   K 6.5*   < > 4.0   CO2 9*   < > 31*   *   < > 103   BUN 54*   < > 9   CREATININE 6.1*   < > 2.6*   ALKPHOS 166*  --   --    ALT 17  --   --    AST 18  --   --    BILITOT 0.3  --   --     < > = values in this interval not displayed.

## 2018-11-09 NOTE — PLAN OF CARE
Problem: Patient Care Overview  Goal: Plan of Care Review  Outcome: Ongoing (interventions implemented as appropriate)  POC reviewed with Pt and family. Pt is awake and alert. Pt follows simple commands at times. Pt is oriented to self. Pt HR has been 90s-100s NSR. SBP has been 140s-160s. IV antibiotics given. Mag, calcium, and phos replaced. Pt O2 sats stable on 2L NC. Pt had 2 BMs today. Goldberg intact with minimal U/O. MD aware of U/O 5-10cc/hr. CRRT stopped this AM. Pt had CT of head this afternoon. MD talked to family about possible Lumbar Puncture. Family at bedside. Pt Tmax is 100.7. Pt last temp was 99.6. MD aware. Pt has no current skin breakdown. Pt turned Q2. Will continue to monitor.

## 2018-11-09 NOTE — H&P
Inpatient Radiology Pre-procedure Note    History of Present Illness:  Juanita Ibarra is a 28 y.o. female with a history of lung transplant, now with encephalopathy who presents for lumbar puncture.  Admission H&P reviewed.  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     Past Surgical History:   Procedure Laterality Date    ABDOMINAL SURGERY      peg tube     [id number] N/A 5/19/2015    Performed by Deny Dias Jr., MD at [facility] OR    BIOPSY-BRONCHUS N/A 12/20/2016    [facility]    BRONCHOSCOPY N/A 5/29/2018    Procedure: BRONCHOSCOPY; Bronchoscopy with BAL and transbronchial biopsies under general anesthesia;  Surgeon: Jake Alvarez MD;  [facility];  Service: Transplant;  Laterality: N/A;    BRONCHOSCOPY N/A 10/1/2018    Procedure: BRONCHOSCOPY;  Surgeon: Jake Alvarez MD;  [facility];  Service: Transplant;  Laterality: N/A;    BRONCHOSCOPY N/A 10/1/2018    Performed by [facility] at [facility]    BRONCHOSCOPY Bilateral 12/20/2016    Performed by [facility] at [facility]    BRONCHOSCOPY - flexible bronchoscopy with probable tissue biopsy CPT 70043 N/A 7/21/2015    Performed by [facility] at [facility]    BRONCHOSCOPY - flexible bronchoscopy with probable tissue biospy CPT 34569 N/A 10/20/2015    Performed by Jake [facility]varez MD at [facility]    BRONCHOSCOPY - flexible bronchoscopy with tissue biopsy CPT 33232 N/A 9/29/2015    Performed by Jake  MD Antonio at [facility] OR 2ND [facility]    BRONCHOSCOPY - flexible bronchoscopy with tissue biopsy CPT 76056 N/A 5/26/2015    Performed by Jake Alvarez MD at [facility] OR [facility]ST [facility]    BRONCHOSCOPY flexible bronchoscopy with tissue biopsy N/A 9/8/2014    Performed by Jake Alvarez MD at [facility] OR [facility]    BRONCHOSCOPY flexible with possible tissue biopsy N/A 8/8/2014    [facility]    BRONCHOSCOPY, BAL, BIOPSIES N/A 3/20/2018    [facility]    BRONCHOSCOPY-FIBEROPTIC N/A 1/29/2016    [facility]    BRONCHOSCOPY; Bronchoscopy with BAL and transbronchial biopsies under general anesthesia N/A 5/29/2018    Performed by Jake Alvarez MD at [facility] OR [address] [place] [address]    CHOLECYSTECTOMY  2016    CHOLECYSTECTOMY-LAPAROSCOPIC N/A 7/22/2016    Performed by Joshua Goldberg, MD at [facility] OR [address] [place] [address]    ENDOMETRIAL ABLATION  2015    KJB    FESS      FESS Bilateral 10/21/2013    Performed by Jared Whatley MD at [facility] OR [address] [place] [address]    FINE NEEDLE ASPIRATION (FNA)  of a cervical lymphadenopathy  1/29/2016    Performed by Joann Cifuentes DO at [facility] OR [address] [place] [address]    flex bronchoscopy with probable tissue biopsy N/A 7/31/2014    Performed by [facility] Provider at [facility] OR [address] [place] [address]    flexible bronchoscopy with tissue biopsy N/A 12/11/2014    Performed by Jake Alvarez MD at [facility] OR [address] [place] [address]    INJECTION-VOCAL CORD Left 6/24/2014    Performed by Jared Whatley MD at [facility] OR [address] [place] [address]    [id number]-EWDL-O-CATH to right chest and removal of portacath to left chests wall. Bilateral 6/24/2014    Performed by Zhen Dorado MD at [facility] OR [address] [place] [address]    LARYNX SURGERY  2016    LUNG TRANSPLANT Bilateral 6/12/14    MYRINGOTOMY W/ TUBES Right 04/2017    MYRINGOTOMY WITH INSERTION OF PE TUBES Right 4/15/2017    Performed by Gary Null MD at [facility] OR [address] [place] [address]    PLACEMENT-TUBE-PEG  5/15/2014    Performed  by David Moses MD at [facility] OR [address] [place] [address]    PORTACATH PLACEMENT Right     rt sc    REMOVAL-TUBE-PEG  5/15/2014    Performed by David Moses MD at [facility] OR [address] [place] [address]    ROBOTIC ASSISTED LAPAROSCOPIC HYSTERECTOMY N/A 4/25/2017    Performed by Deny Dias Jr., MD at [facility] OR    SALPINGECTOMY Bilateral 2015    KJB    SALPINGECTOMY-LAPAROSCOPIC Bilateral 5/19/2015    Performed by Deny Dias Jr., MD at [facility] OR    SINUS SURGERY FUNCTIONAL ENDOSCOPIC WITH NAVIGATION Bilateral 4/3/2017    Performed by Cesar Olsen MD at [facility] OR [address] [place] [address]    SINUS SURGERY FUNCTIONAL ENDOSCOPIC WITH NAVIGATION, 76541, 55925, 06733, 17286 Bilateral 2/5/2015    Performed by Cesar Olsen MD at [facility] OR [address] [place] [address]    THYROPLASTY - Medialization laryngoplasty, cricothyroid subluxation, arytenoid repositioning Left 8/2/2016    Performed by Gary Null MD at [facility] OR [address] [place] [address]    TRANSPLANT-LUNG Bilateral 6/11/2014    Performed by Adolfo Huston MD at [facility] OR [address] [place] [address]       Review of Systems:   As documented in primary team H&P    Home Meds:   Prior to Admission medications    Medication Sig Start Date End Date Taking? Authorizing Provider   aripiprazole (ABILIFY) 2 MG Tab Take 2 mg by mouth once daily.    Historical Provider, MD   butalbital-acetaminophen-caffeine -40 mg (FIORICET, ESGIC) -40 mg per tablet TAKE 1 TABLET BY MOUTH EVERY 6 HOURS AS NEEDED FOR HEADACHE 9/13/17   Jake Alvarez MD   calcium-vitamin D3 500 mg(1,250mg) -200 unit per tablet Take 1 tablet by mouth 2 (two) times daily with meals. 3/30/16   Jake Alvarez MD   dapsone 100 MG Tab Take 1 tablet (100 mg total) by mouth once daily. 7/28/18   Jake Alvarez MD   diphenhydrAMINE (BENADRYL) 50 MG tablet Take 1 tablet (50 mg total) by mouth every 6 (six) hours as needed for Itching. 8/19/14   Jake Alvarez MD   DULERA 100-5 mcg/actuation HFAA INHALE 1 PUFF BY MOUTH TWICE DAILY 2/1/18   Jake Alvarez MD    fexofenadine (ALLEGRA) 180 MG tablet Take 180 mg by mouth once daily.    Historical Provider, MD   fluconazole (DIFLUCAN) 150 MG Tab TAKE 1 TABLET BY MOUTH EVERY WEEK 8/13/18   Jake Alvarez MD   fluticasone (FLONASE) 50 mcg/actuation nasal spray INSERT 2 SPRAYS IN EACH NOSTRIL DAILY 12/11/17   Cesar Olsen MD   folic acid (FOLVITE) 1 MG tablet Take 1 tablet (1,000 mcg total) by mouth once daily. 7/28/18   Jake Alvarez MD   gabapentin (NEURONTIN) 300 MG capsule Take 1 capsule (300 mg total) by mouth 3 (three) times daily.  Patient taking differently: Take 1,100 mg by mouth 3 (three) times daily.  3/6/17   Jake Alvarez MD   levalbuterol (XOPENEX) 1.25 mg/3 mL nebulizer solution Take 3 mLs (1.25 mg total) by nebulization every 8 (eight) hours as needed for Wheezing or Shortness of Breath. Rescue 10/24/18 10/24/19  Jake Alvarez MD   lipase-protease-amylase (CREON) 36,000-114,000- 180,000 unit CpDR Take 4 capsules by mouth 3 (three) times daily with meals. Take 3 with snacks prn.  Patient taking differently: Take 5 capsules by mouth 3 (three) times daily with meals. Take 3 with snacks prn. 6/18/18   Francisca Morin DO   lisinopril 10 MG tablet Take 10 mg by mouth once daily.    Historical Provider, MD   LORazepam (ATIVAN) 1 MG tablet Take 2 mg by mouth every 6 (six) hours as needed for Anxiety.     Historical Provider, MD   magnesium oxide (MAG-OX) 400 mg tablet Take 1 tablet (400 mg total) by mouth 2 (two) times daily. 4/30/18   Francisca Morin DO   montelukast (SINGULAIR) 10 mg tablet Take 1 tablet (10 mg total) by mouth once daily. 7/28/18   Jake Alvarez MD   morphine (MS CONTIN) 15 MG 12 hr tablet Take 15 mg by mouth 2 (two) times daily.     Historical Provider, MD   multivit,min52-folic-vitK-cQ10 (AQUADEKS) 100-700-10 mcg-mcg-mg Cap cap 1 tab twice daily 6/5/18   Jake Alvarez MD   mycophenolate (CELLCEPT) 250 mg Cap Take 2 capsules (500 mg total) by mouth 2  (two) times daily. 4/30/18   Francisca Morin,    omeprazole (PRILOSEC) 40 MG capsule TAKE 1 CAPSULE BY MOUTH EVERY MORNING 12/21/17   Jake Alavrez MD   ondansetron (ZOFRAN) 8 MG tablet TAKE 1 TABLET(8 MG) BY MOUTH EVERY 8 HOURS AS NEEDED FOR NAUSEA 10/12/18   Jake Alvarez MD   oxycodone (ROXICODONE) 5 MG immediate release tablet Take 10 mg by mouth every 4 (four) hours as needed for Pain.    Marisela Jones MD   polyethylene glycol (GLYCOLAX) 17 gram PwPk Take 17 g by mouth 2 (two) times daily. 12/22/16   Jake Alvarez MD   predniSONE (DELTASONE) 5 MG tablet Take 1 tablet (5 mg total) by mouth once daily. 7/28/18   Jake Alvarez MD   PROCHAMBER USE AS DIRECTED 1/3/17   Jake Alvarez MD   promethazine (PHENERGAN) 25 MG tablet TAKE 1 TABLET(25 MG) BY MOUTH EVERY 8 HOURS AS NEEDED 10/12/18   Jake Alvarez MD   tacrolimus (PROGRAF) 0.5 MG Cap Take 1 capsule (0.5 mg total) by mouth once daily. Take with morning dose. Total daily dose is 3.5 mg every morning and 3 mg every evening. 10/22/18   Jake Alvarez MD   tacrolimus (PROGRAF) 1 MG Cap Take 3 capsules (3 mg total) by mouth every 12 (twelve) hours. 10/22/18   Jake Alvarez MD   tiZANidine (ZANAFLEX) 4 MG tablet TAKE 2 TABLETS BY MOUTH EVERY 6 HOURS AS NEEDED 10/31/18   Jake Alvarez MD   topiramate (TOPAMAX) 25 MG tablet Take 1 tablet (25 mg total) by mouth 2 (two) times daily.  Patient taking differently: Take 50 mg by mouth 2 (two) times daily.  5/31/18 5/31/19  Jake Alvarez MD   venlafaxine (EFFEXOR-XR) 75 MG 24 hr capsule Take 1 capsule (75 mg total) by mouth every evening.  Patient taking differently: Take 150 mg by mouth every evening.  10/5/18 10/5/19  Francisca Morin DO     Scheduled Meds:    acyclovir (ZOVIRAX) IVPB (wt 35 - 70 kg)  5 mg/kg Intravenous Daily    ceFEPime (MAXIPIME) IVPB  1 g Intravenous Daily    diphenhydrAMINE  50 mg Intravenous Once    fluticasone-vilanterol  1 puff  Inhalation Daily    lorazepam  2 mg Intravenous Once    [START ON 11/10/2018] methylPREDNISolone sodium succinate  10 mg Intravenous Daily    mycophenolate (CELLCEPT) IVPB  500 mg Intravenous Q12H    pantoprozole (PROTONIX) IV  40 mg Intravenous Daily    tacrolimus  1 mg Sublingual BID     Continuous Infusions:   PRN Meds:sodium chloride, acetaminophen, levalbuterol, magnesium sulfate IVPB **AND** magnesium sulfate IVPB, naloxone, ondansetron, potassium chloride 10% **AND** potassium chloride 10% **AND** potassium chloride 10%, potassium phosphate IVPB, promethazine, tiZANidine  Anticoagulants/Antiplatelets: no anticoagulation    Allergies:   Review of patient's allergies indicates:   Allergen Reactions    Albuterol Palpitations    Colistin Anaphylaxis    Vancomycin analogues      Infusion reaction that does not resolve with slowing    Neupogen [filgrastim] Other (See Comments)     Ostealgia after five daily doses of 300 mcg.      Bactrim [sulfamethoxazole-trimethoprim] Hives    Ceftazidime Hives     Pt stated can tolerate cefapine not ceftazidime    Ceftazidime     Dronabinol Other (See Comments)     Mental changes/hallucinations    Haldol [haloperidol lactate] Other (See Comments)     Seizure like activity    Nsaids (non-steroidal anti-inflammatory drug)      Cannot have due to lung transplant    Adhesive Rash     Cloth tape- please use tegaderm or paper tape    Aztreonam Rash    Ciprofloxacin Nausea And Vomiting     Projectile N/V, per patient.  Unwilling to retry therapy.     Sedation Hx: have not been any systemic reactions    Labs:  No results for input(s): INR in the last 168 hours.    Invalid input(s):  PT,  PTT    Recent Labs   Lab 11/09/18  0345   WBC 4.72   HGB 6.4*   HCT 21.6*   MCV 91   PLT 57*      Recent Labs   Lab 11/07/18  0312  11/09/18  0345   *   < > 133*      < > 145   K 6.5*   < > 4.0   *   < > 103   CO2 9*   < > 31*   BUN 54*   < > 9   CREATININE 6.1*   < >  2.6*   CALCIUM 8.0*   < > 7.8*   MG 2.0   < > 2.4   ALT 17  --   --    AST 18  --   --    ALBUMIN 3.2*   < > 2.6*   BILITOT 0.3  --   --    BILIDIR 0.1  --   --     < > = values in this interval not displayed.         Vitals:  Temp: 99.8 °F (37.7 °C) (11/09/18 1501)  Pulse: 76 (11/09/18 1515)  Resp: (!) 43 (11/09/18 1515)  BP: (!) 163/90 (11/09/18 1515)  SpO2: (!) 93 % (11/09/18 1515)     Physical Exam:  ASA: 2  Mallampati: 2    General: no acute distress  Mental Status: alert and awake  HEENT: normocephalic, atraumatic  Chest: unlabored breathing  Heart: regular heart rate  Abdomen: nondistended  Extremity: moves all extremities    Plan: lumbar puncture  Sedation Plan: local     Balaji Schmitz MD  Radiology PGY-3  [phone]

## 2018-11-09 NOTE — SUBJECTIVE & OBJECTIVE
"Subjective:     Interval History: No acute events overnight. Patient noted to be restless throughout night and was sleeping until 0800 this morning. Patient has remained awake and sitting up for most of the day. Patient able to follow simple commands but remains alert to self only. Patient expressing pain by saying "ow" throughout much of the day but is unable to pinpoint location of pain.  and family member at bedside for much of the day. Will plan for LP at 1500 this afternoon.     Continuous Infusions:    Scheduled Meds:   acyclovir (ZOVIRAX) IVPB (wt 35 - 70 kg)  5 mg/kg Intravenous Daily    ceFEPime (MAXIPIME) IVPB  1 g Intravenous Daily    diphenhydrAMINE  50 mg Intravenous Once    fluticasone-vilanterol  1 puff Inhalation Daily    [START ON 11/10/2018] methylPREDNISolone sodium succinate  10 mg Intravenous Daily    mycophenolate (CELLCEPT) IVPB  500 mg Intravenous Q12H    pantoprozole (PROTONIX) IV  40 mg Intravenous Daily    tacrolimus  1 mg Sublingual BID     PRN Meds:sodium chloride, acetaminophen, levalbuterol, magnesium sulfate IVPB **AND** magnesium sulfate IVPB, naloxone, ondansetron, potassium chloride 10% **AND** potassium chloride 10% **AND** potassium chloride 10%, potassium phosphate IVPB, promethazine, tiZANidine    Review of patient's allergies indicates:   Allergen Reactions    Albuterol Palpitations    Colistin Anaphylaxis    Vancomycin analogues      Infusion reaction that does not resolve with slowing    Neupogen [filgrastim] Other (See Comments)     Ostealgia after five daily doses of 300 mcg.      Bactrim [sulfamethoxazole-trimethoprim] Hives    Ceftazidime Hives     Pt stated can tolerate cefapine not ceftazidime    Ceftazidime     Dronabinol Other (See Comments)     Mental changes/hallucinations    Haldol [haloperidol lactate] Other (See Comments)     Seizure like activity    Nsaids (non-steroidal anti-inflammatory drug)      Cannot have due to lung transplant "    Adhesive Rash     Cloth tape- please use tegaderm or paper tape    Aztreonam Rash    Ciprofloxacin Nausea And Vomiting     Projectile N/V, per patient.  Unwilling to retry therapy.       Review of Systems   Unable to perform ROS: Acuity of condition     Objective:   Physical Exam   Constitutional: She appears well-developed and well-nourished.  Non-toxic appearance. She does not have a sickly appearance. She appears ill. She appears distressed.   HENT:   Head: Atraumatic.   Nose: Nose normal.   Round facies   Eyes: Conjunctivae and EOM are normal. No scleral icterus.   Neck: Normal range of motion. Neck supple. No JVD present. No tracheal deviation present.   Cardiovascular: Regular rhythm, normal heart sounds and intact distal pulses. Tachycardia present. Exam reveals no gallop and no friction rub.   No murmur heard.  Pulmonary/Chest: Effort normal. No stridor. She has no decreased breath sounds. She has no wheezes. She has no rhonchi. She has no rales.   Abdominal: Soft. Bowel sounds are normal. She exhibits no distension. There is no tenderness.   Musculoskeletal: Normal range of motion. She exhibits no edema, tenderness or deformity.   Neurological:   Awake and alert. Oriented to self. Follows simple commands. Moves all extremities.    Skin: Skin is warm and dry. No rash noted. She is not diaphoretic. No erythema. No pallor.   Psychiatric:   Unable to be determined due to mental status   Nursing note and vitals reviewed.        Vital Signs (Most Recent):  Temp: 98.7 °F (37.1 °C) (11/09/18 1137)  Pulse: 91 (11/09/18 1300)  Resp: (!) 35 (11/09/18 1300)  BP: (!) 130/98 (11/09/18 1300)  SpO2: (!) 93 % (11/09/18 1300) Vital Signs (24h Range):  Temp:  [98.5 °F (36.9 °C)-100.2 °F (37.9 °C)] 98.7 °F (37.1 °C)  Pulse:  [] 91  Resp:  [11-39] 35  SpO2:  [87 %-100 %] 93 %  BP: (130-160)/(65-99) 130/98     Weight: 60.7 kg (133 lb 13.1 oz)  Body mass index is 25.28 kg/m².      Intake/Output Summary (Last 24 hours)  at 11/9/2018 1324  Last data filed at 11/9/2018 1306  Gross per 24 hour   Intake 467.16 ml   Output 220 ml   Net 247.16 ml       Ventilator Data:     Oxygen Concentration (%):  [28] 28    Hemodynamic Parameters:       Lines/Drains:       Port A Cath Single Lumen right subclavian (Active)   Dressing Type Transparent 11/8/2018 11:15 AM   Dressing Status Clean;Dry;Intact 11/8/2018 11:15 AM   Dressing Intervention Dressing reinforced 11/8/2018  3:05 AM   Dressing Change Due 10/06/18 10/4/2018  8:00 PM   Line Status Infusing 11/8/2018 11:15 AM   Flush Performed Yes 11/8/2018 11:15 AM   Date to be Reflushed 10/04/18 10/3/2018  8:00 PM   Daily Line Review Performed 11/8/2018 11:15 AM   Type of Needle Nicole 11/8/2018 11:15 AM   Gauge 20 11/8/2018 11:15 AM   Needle Length 3/4 in 10/1/2018  8:00 AM   Needle Status Left in place 11/8/2018 11:15 AM   Needle Insertion Date 09/29/18 10/1/2018  8:00 AM   Needle Insertion Time 1800 10/1/2018  8:00 AM   Needle Removal Date 09/29/18 9/29/2018 11:00 AM   Needle Removal Time 1136 9/29/2018 11:00 AM   Number of days:             Trialysis (Dialysis) Catheter 11/07/18 1023 right femoral (Active)   IV Device Securement sutures 11/8/2018 11:15 AM   Additional Site Signs no drainage;no streak formation;no palpable cord;no pain;no edema;no warmth;no erythema 11/8/2018 11:15 AM   Patency/Care flushed w/o difficulty 11/8/2018 11:15 AM   Site Assessment Clean;Dry;Intact;No redness;No swelling 11/8/2018 11:15 AM   Status Accessed 11/8/2018 11:15 AM   Flows Good 11/8/2018 11:15 AM   Dressing Intervention Dressing reinforced 11/8/2018  3:05 AM   Dressing Status Clean;Dry;Intact;Biopatch in place 11/8/2018 11:15 AM   Dressing Change Due 11/14/18 11/8/2018 11:15 AM   Verification by X-ray Yes 11/7/2018 10:30 AM   Site Condition No complications 11/8/2018 11:15 AM   Dressing Occlusive 11/8/2018 11:15 AM   Daily Line Review Performed 11/8/2018 11:15 AM   Number of days: 1            Peripheral IV -  "Single Lumen 11/07/18 0200 Right Hand (Active)   Site Assessment Clean;Dry;Intact;No redness;No swelling 11/8/2018 11:15 AM   Line Status Saline locked 11/8/2018 11:15 AM   Dressing Status Intact;Dry;Clean 11/8/2018 11:15 AM   Dressing Intervention New dressing 11/8/2018  3:05 AM   Dressing Change Due 11/11/18 11/8/2018  3:05 AM   Site Change Due 11/11/18 11/8/2018 11:15 AM   Reason Not Rotated Not due 11/8/2018 11:15 AM   Number of days: 1            Urethral Catheter 11/07/18 0300 (Active)   Site Assessment Clean;Intact 11/8/2018 11:15 AM   Collection Container Urimeter 11/8/2018 11:15 AM   Securement Method secured to top of thigh w/ adhesive device 11/8/2018 11:15 AM   Catheter Care Performed yes 11/8/2018 11:15 AM   Reason for Continuing Urinary Catheterization Critically ill in ICU requiring intensive monitoring 11/8/2018 11:15 AM   CAUTI Prevention Bundle StatLock in place w 1" slack;Intact seal between catheter & drainage tubing;Drainage bag off the floor;Green sheeting clip in use;No dependent loops or kinks;Drainage bag not overfilled (<2/3 full);Drainage bag below bladder 11/8/2018 11:15 AM   Output (mL) 5 mL 11/8/2018 12:00 PM   Number of days: 1       Significant Labs:  CBC:  Recent Labs   Lab 11/09/18  0345   WBC 4.72   RBC 2.37*   HGB 6.4*   HCT 21.6*   PLT 57*   MCV 91   MCH 27.0   MCHC 29.6*     BMP:  Recent Labs   Lab 11/09/18  0345      K 4.0      CO2 31*   BUN 9   CREATININE 2.6*   CALCIUM 7.8*      Tacrolimus Levels:  Recent Labs   Lab 11/07/18  0503   TACROLIMUS 4.6*     Microbiology:  Microbiology Results (last 7 days)     Procedure Component Value Units Date/Time    Gram stain [[id number]]     Order Status:  No result Specimen:  CSF (Spinal Fluid) from CSF Tap, Tube 3     CSF culture [[id number]]     Order Status:  No result Specimen:  CSF (Spinal Fluid) from CSF Tap, Tube 3     Cryptococcal antigen, CSF [[id number]]     Order Status:  No result Specimen:  CSF (Spinal Fluid) from CSF " Tap, Tube 3     Fungus culture [[id number]]     Order Status:  No result Specimen:  CSF (Spinal Fluid) from CSF Tap, Tube 3     Blood culture [[id number]] Collected:  11/07/18 0200    Order Status:  Completed Specimen:  Blood from Peripheral, Antecubital, Right Updated:  11/09/18 0613     Blood Culture, Routine No Growth to date     Blood Culture, Routine No Growth to date     Blood Culture, Routine No Growth to date    Blood culture [[id number]] Collected:  11/07/18 0252    Order Status:  Completed Specimen:  Blood from Peripheral, Wrist, Right Updated:  11/09/18 0613     Blood Culture, Routine No Growth to date     Blood Culture, Routine No Growth to date     Blood Culture, Routine No Growth to date    Cryptococcal antigen [[id number]] Collected:  11/08/18 1641    Order Status:  Sent Specimen:  Blood Updated:  11/08/18 1711    Fungus Culture, Blood or Bone Marrow [[id number]] Collected:  11/07/18 1715    Order Status:  Completed Specimen:  Blood Updated:  11/08/18 1323     Fungus Cult, blood or BM Culture in progress    Respiratory Viral Panel by PCR [facility]; Nasal Swab [[id number]] Collected:  11/07/18 0346    Order Status:  Completed Specimen:  Respiratory Updated:  11/08/18 1212     Respiratory Virus Panel, source Nasal Swab     RVP - Adenovirus Not Detected     Comment: Detects Serotypes B and E. Detection of Serotype C may   be limited. If Adenovirus infection is suspected and a   Not Detected result is returned the sample should be   re-tested for Adenovirus using an independent method  (e.g. [employer] Adenovirus Quantitative Real-Time  PCR test.          Enterovirus Not Detected     Comment: Cross-reactivity has been observed between certain Rhinovirus  strains and the Enterovirus assay.          Human Bocavirus Not Detected     Human Coronavirus Not Detected     Comment: The Human Coronavirus assay detects Human coronavirus types  229E, OC43,NL63 and HKU1.          RVP - Human Metapneumovirus (hMPV) Not  Detected     RVP - Influenza A Not Detected     Influenza A - K5J0-20 Not Detected     RVP - Influenza B Not Detected     Parainfluenza Not Detected     Respiratory Syncytial VirusVirus (RSV) A Not Detected     Comment: The Respiratory Syncytial Viral assay detects types A and B,  however it does not distinguish between the two.          RVP - Rhinovirus Not Detected     Comment: Cross-Reactivity has been observed between certain   Rhinovirus strains and the Enterovirus assay.  Target Enriched Mulitplex Polymerase Chain Reaction (TEM-PCR)  allows for the detection of multiple pathogens out of a single  reaction.  This test was developed and its performance   characteristics determined by [employer].  It has not   been cleared or approved by the U.S.Food and Drug Administration.  Results should be used in conjunction with clinical findings,   and should not form the sole basis for a diagnosis or treatment  decision.  TEM-PCR is a licensed technology of [employer].         Narrative:       Receiving Lab:->[facility]    Culture, Respiratory with Gram Stain [[id number]]     Order Status:  Canceled Specimen:  Respiratory from Sputum, Expectorated           I have reviewed all pertinent labs within the past 24 hours.    Diagnostic Results:  Chest X-Ray: I personally reviewed the films and findings are: no acute process

## 2018-11-09 NOTE — PROGRESS NOTES
[facility]  Nephrology  Progress Note    Patient Name: Juanita Ibarra  MRN: [id number]  Admission Date: 11/7/2018  Hospital Length of Stay: 2 days  Attending Provider: Francisca Morin DO   Primary Care Physician: Jake Alvarez MD  Principal Problem:Acute metabolic encephalopathy    Subjective:     HPI: The patient is a  28 year-old  female with a medical history of BLT secondary to CF. 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 Patient presents as a transfer from [facility] in [place] on 11/7 after presenting there with a 3 day history of lethargy and dizziness per spouse. The patient has a history of multiple hospitalizations 2/2 PNA and/or dyspnea over the last couple of months. The patient was somnolent during assessment, therefore, majority of HPI is from her spouse and records. The spouse states that the patient had been feeling nauseated for the last 3 days. He states that she also stopped eating during that time. Despite her decreased appetite, the spouse continued to give her all of her prescribed medications medications including her Morphine as normal. On admit to the OSH, her intial BP was 83/52, , and SPO2 was 95% on ?room air. According to the records, she also had a ?LISBETH and was hyperkalemic. She was given calcium gluconate, insulin, D50, and Kayexalate at the OSH. The patient was also given a total of 4 L of ?IVFs for her BP and was started on Zosyn. She was then transferred to [facility] via flight care team overnight for an higher level of care. She was started on a bicarb gtt 2/2 severe metabolic acidosis and narcan drip 2/2 hypercapnic respiratory failure. ABG on admit was 6.9/59 and her bicarb was 12. ABG has improved to 7.2/48.6 and bicarb to 21.8 since being on bicarb drip. Patient on 2 L  "via NC.  The patient was hyperkalemic, 6.5, on admit, and was shifted. Potasium 5.1 after medications. Nephrology consulted for LISBETH and severe metabolic acidosis. The patient's spouse denies a history of kidney issues. Her baseline SCr from 2015 to 2018 has been around ~ 1.0. Her last appointment visits in October revealed a SCr of 1.5 and 1.8. The patient's  did state that a couple of months ago the patient was found to have a LISBETH during a outpatient appointment in [place]. He states that she was sent to ED and was given fluids and discharged home. He denied any issues around that time leading to her LISBETH and states it was "randomly" found on labs. The patient was admitted with a SCr of 6.1. Urine output has been between 10-30 ml/hr. She put out a total of 300 ml during night shift.     Nephrology consulted for management of LISBETH and metabolic acidosis.         Interval History: NAEON. Electrolytes and acid/base stable this morning. SCr  2.6, urine output still poor. 175mL/24h. Hgb 6.4 today, patient to receive PRBCs per primary. BP stable, not on pressors.  Patient more alert today, following some direct commands but not at baseline. Patient head CT on 11/8 which was unremarkable and is scheduled for a LP. Low grade fever overnight, TMAX 100.7.    Review of patient's allergies indicates:   Allergen Reactions    Albuterol Palpitations    Colistin Anaphylaxis    Vancomycin analogues      Infusion reaction that does not resolve with slowing    Neupogen [filgrastim] Other (See Comments)     Ostealgia after five daily doses of 300 mcg.      Bactrim [sulfamethoxazole-trimethoprim] Hives    Ceftazidime Hives     Pt stated can tolerate cefapine not ceftazidime    Ceftazidime     Dronabinol Other (See Comments)     Mental changes/hallucinations    Haldol [haloperidol lactate] Other (See Comments)     Seizure like activity    Nsaids (non-steroidal anti-inflammatory drug)      Cannot have due to lung " transplant    Adhesive Rash     Cloth tape- please use tegaderm or paper tape    Aztreonam Rash    Ciprofloxacin Nausea And Vomiting     Projectile N/V, per patient.  Unwilling to retry therapy.     Current Facility-Administered Medications   Medication Frequency    0.9%  NaCl infusion (for blood administration) Q24H PRN    acetaminophen tablet 1,000 mg Q6H PRN    acyclovir (ZOVIRAX) 290 mg in dextrose 5 % 100 mL IVPB Daily    ceFEPIme injection 1 g Daily    diphenhydrAMINE injection 50 mg Once    fluticasone-vilanterol 100-25 mcg/dose diskus inhaler 1 puff Daily    levalbuterol nebulizer solution 1.25 mg Q8H PRN    magnesium sulfate 2g in water 50mL IVPB (premix) PRN    And    magnesium sulfate 2g in water 50mL IVPB (premix) PRN    [START ON 11/10/2018] methylPREDNISolone sodium succinate injection 10 mg Daily    mycophenolate (CELLCEPT) 500 mg in dextrose 5 % 100 mL IVPB Q12H    naloxone 0.4 mg/mL injection 0.4 mg PRN    ondansetron injection 8 mg Q6H PRN    pantoprazole injection 40 mg Daily    potassium chloride 10% oral solution 40 mEq PRN    And    potassium chloride 10% oral solution 40 mEq PRN    And    potassium chloride 10% oral solution 60 mEq PRN    potassium phosphate 20 mmol in dextrose 5 % 500 mL infusion PRN    promethazine injection 25 mg Q6H PRN    tacrolimus capsule 1 mg BID    tiZANidine tablet 8 mg Q6H PRN       Objective:     Vital Signs (Most Recent):  Temp: 98.7 °F (37.1 °C) (11/09/18 1137)  Pulse: 91 (11/09/18 1300)  Resp: (!) 35 (11/09/18 1300)  BP: (!) 130/98 (11/09/18 1300)  SpO2: (!) 93 % (11/09/18 1300)  O2 Device (Oxygen Therapy): room air (11/09/18 1137) Vital Signs (24h Range):  Temp:  [98.5 °F (36.9 °C)-100.2 °F (37.9 °C)] 98.7 °F (37.1 °C)  Pulse:  [] 91  Resp:  [11-39] 35  SpO2:  [87 %-100 %] 93 %  BP: (130-160)/(65-99) 130/98     Weight: 60.7 kg (133 lb 13.1 oz) (11/09/18 0500)  Body mass index is 25.28 kg/m².  Body surface area is 1.62 meters  squared.    I/O last 3 completed shifts:  In: 2457.2 [I.V.:2457.2]  Out: 2645 [Urine:295; Other:2350]    Physical Exam   Constitutional: She appears well-developed. She is uncooperative. She does not have a sickly appearance. She appears ill. No distress. Nasal cannula in place.   Alert at times, following direct commands. DAVE to assess orientation    HENT:   Head: Normocephalic and atraumatic.   Mouth/Throat: No oropharyngeal exudate.   Eyes: Right eye exhibits no discharge. Left eye exhibits no discharge.   Neck: Normal range of motion. Neck supple. No JVD present.   Cardiovascular: Normal rate, regular rhythm, normal heart sounds and intact distal pulses.   No murmur heard.  Pulmonary/Chest: Effort normal and breath sounds normal. She has no wheezes. She has no rhonchi. She has no rales.   Abdominal: Soft. Bowel sounds are normal. She exhibits no distension. There is no tenderness. There is no guarding.   Musculoskeletal: Normal range of motion. She exhibits no edema.   Neurological: She is alert. No cranial nerve deficit.   Skin: Skin is warm and dry. No rash noted. She is not diaphoretic. No erythema. There is pallor.   Psychiatric: Her speech is normal. Her mood appears not anxious. She is slowed and withdrawn. Cognition and memory are impaired. She does not exhibit a depressed mood.   Nursing note and vitals reviewed.      Significant Labs:  ABGs:   Recent Labs   Lab 11/09/18  0815   PH 7.451*   PCO2 50.1*   HCO3 34.9*   POCSATURATED 65*   BE 11     CBC:   Recent Labs   Lab 11/09/18  0345   WBC 4.72   RBC 2.37*   HGB 6.4*   HCT 21.6*   PLT 57*   MCV 91   MCH 27.0   MCHC 29.6*     CMP:   Recent Labs   Lab 11/07/18  0312  11/09/18  0345   *   < > 133*   CALCIUM 8.0*   < > 7.8*   ALBUMIN 3.2*   < > 2.6*   PROT 6.0  --   --       < > 145   K 6.5*   < > 4.0   CO2 9*   < > 31*   *   < > 103   BUN 54*   < > 9   CREATININE 6.1*   < > 2.6*   ALKPHOS 166*  --   --    ALT 17  --   --    AST 18  --    --    BILITOT 0.3  --   --     < > = values in this interval not displayed.          Assessment/Plan:     Acute kidney failure    The patient is a  28 year-old  female with medical history of BLT secondary to CF. 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 Patient presents as a transfer from [facility] in [place] on 11/7 after presenting there with a 3 day history of lethargy and dizziness per spouse. She was transferred to [facility] via flight care team overnight. She was started on a bicarb gtt 2/2 severe metabolic acidosis and narcan drip 2/2 hypercapnic respiratory failure. ABG on admit was 6.9/59 and her bicarb was 12. ABG improved to 7.2/48.6 and bicarb to 21.8 since being on bicarb drip. Patient on 2 L via NC. The patient was hyperkalemic, 6.5, on admit, and was shifted. Potasium 5.1 after medications. Nephrology consulted for LISBETH and severe metabolic acidosis. The patient's spouse denies a history of kidney issues. Her baseline SCr from 2015 to 2018 has been around ~ 1.0. Her appointment visits in October revealed a SCr of 1.5 and 1.8. The patient was admitted with a SCr of 6.1. Urine outputt has been between 10-30 ml/hr. She put out a total of 300 ml during night shift. UA today with 2+ protein. UPCR pending. LISBETH secondary to ATN likely caused by hypotension/sepsis in the setting of severe metabolic acidosis.     No urgent need for continual SLED at this time  Electrolytes and acid base stable. VBG today 7.45/50.1. Bicarb 34.9.   Patient mentation not at baseline, head CT unremarkable. Patient scheduled for LP today per team.   Maintain urinary catheter for strict I/Os. Low UOP, 175 ml/24h  Hgb 6.4 today, patient to receive a unit of blood which will likely help improve her renal perfusion   Renal US completed on 11/7, showing reduced perfusion  bilaterally   Urine creatinine/protein ratio was 0.51  Avoid NSAIDs, contrast, nephrotoxins   Monitor strict I/Os, daily weights  Renally dose medications to current GFR  Will discuss with staff            Case discussed with staff further recs with attestation.     I will follow-up with patient. Please contact us if you have any additional questions.    GUSTABO Lunsford DNP, APRN, FNP-C  Department of Nephrology  [facility]  Pager: [phone]

## 2018-11-09 NOTE — ASSESSMENT & PLAN NOTE
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 Patient presents as a transfer from [facility] in [place] on 11/7 after presenting there with a 3 day history of lethargy and dizziness per spouse. She was transferred to [facility] via flight care team overnight. She was started on a bicarb gtt 2/2 severe metabolic acidosis and narcan drip 2/2 hypercapnic respiratory failure. ABG on admit was 6.9/59 and her bicarb was 12. ABG improved to 7.2/48.6 and bicarb to 21.8 since being on bicarb drip. Patient on 2 L via NC. The patient was hyperkalemic, 6.5, on admit, and was shifted. Potasium 5.1 after medications. Nephrology consulted for LISBETH and severe metabolic acidosis. The patient's spouse denies a history of kidney issues. Her baseline SCr from 2015 to 2018 has been around ~ 1.0. Her appointment visits in October revealed a SCr of 1.5 and 1.8. The patient was admitted with a SCr of 6.1. Urine outputt has been between 10-30 ml/hr. She put out a total of 300 ml during night shift. UA today with 2+ protein. UPCR pending. LISBETH secondary to ATN likely caused by hypotension/sepsis in the setting of severe metabolic acidosis.     No urgent need for continual SLED at this time  Electrolytes and acid base stable. VBG today 7.45/50.1. Bicarb 34.9.   Patient mentation not at baseline, head CT unremarkable. Patient scheduled for LP today per team.   Maintain urinary catheter for strict I/Os. Low UOP, 175 ml/24h  Hgb 6.4 today, patient to receive a unit of blood which will likely help improve her renal perfusion   Renal US completed on 11/7, showing reduced perfusion bilaterally   Urine creatinine/protein ratio was 0.51  Avoid NSAIDs, contrast, nephrotoxins   Monitor strict I/Os, daily  weights  Renally dose medications to current GFR  Will discuss with staff

## 2018-11-10 NOTE — SUBJECTIVE & OBJECTIVE
Interval History:   Patient evaluated at bedside, no significant event overnight.     Review of patient's allergies indicates:   Allergen Reactions    Albuterol Palpitations    Colistin Anaphylaxis    Vancomycin analogues      Infusion reaction that does not resolve with slowing    Neupogen [filgrastim] Other (See Comments)     Ostealgia after five daily doses of 300 mcg.      Bactrim [sulfamethoxazole-trimethoprim] Hives    Ceftazidime Hives     Pt stated can tolerate cefapine not ceftazidime    Ceftazidime     Dronabinol Other (See Comments)     Mental changes/hallucinations    Haldol [haloperidol lactate] Other (See Comments)     Seizure like activity    Nsaids (non-steroidal anti-inflammatory drug)      Cannot have due to lung transplant    Adhesive Rash     Cloth tape- please use tegaderm or paper tape    Aztreonam Rash    Ciprofloxacin Nausea And Vomiting     Projectile N/V, per patient.  Unwilling to retry therapy.     Current Facility-Administered Medications   Medication Frequency    acetaminophen tablet 1,000 mg Q6H PRN    acyclovir (ZOVIRAX) 290 mg in dextrose 5 % 100 mL IVPB Daily    [START ON 11/11/2018] ARIPiprazole tablet 2 mg Daily    ceFEPIme injection 1 g Daily    enoxaparin injection 30 mg Daily    [START ON 11/11/2018] fluticasone 50 mcg/actuation nasal spray 100 mcg Daily    fluticasone-vilanterol 100-25 mcg/dose diskus inhaler 1 puff Daily    levalbuterol nebulizer solution 1.25 mg Q8H PRN    magnesium sulfate 2g in water 50mL IVPB (premix) PRN    And    magnesium sulfate 2g in water 50mL IVPB (premix) PRN    methylPREDNISolone sodium succinate injection 10 mg Daily    metoprolol tartrate (LOPRESSOR) tablet 25 mg BID    mycophenolate (CELLCEPT) 500 mg in dextrose 5 % 100 mL IVPB BID    naloxone 0.4 mg/mL injection 0.4 mg PRN    nystatin 100,000 unit/mL suspension 500,000 Units TID    ondansetron injection 8 mg Q6H PRN    oxyCODONE immediate release tablet 5 mg Q4H  PRN    [START ON 11/11/2018] pantoprazole EC tablet 40 mg Daily    potassium chloride 10% oral solution 40 mEq PRN    And    potassium chloride 10% oral solution 40 mEq PRN    And    potassium chloride 10% oral solution 60 mEq PRN    promethazine (PHENERGAN) 25 mg in dextrose 5 % 50 mL IVPB Q6H PRN    tiZANidine tablet 8 mg Q6H PRN       Objective:     Vital Signs (Most Recent):  Temp: 100 °F (37.8 °C) (11/10/18 1521)  Pulse: 81 (11/10/18 1555)  Resp: 18 (11/10/18 1521)  BP: (!) 168/86(FELICIA felix made aware) (11/10/18 1521)  SpO2: 95 % (11/10/18 1521)  O2 Device (Oxygen Therapy): room air (11/10/18 1521) Vital Signs (24h Range):  Temp:  [97.6 °F (36.4 °C)-100 °F (37.8 °C)] 100 °F (37.8 °C)  Pulse:  [51-81] 81  Resp:  [10-29] 18  SpO2:  [92 %-100 %] 95 %  BP: (137-185)/(70-96) 168/86     Weight: 60.7 kg (133 lb 13.1 oz) (11/09/18 0500)  Body mass index is 25.28 kg/m².  Body surface area is 1.62 meters squared.    I/O last 3 completed shifts:  In: 1057.2 [I.V.:507.2; Blood:350; IV Piggyback:200]  Out: 570 [Urine:570]    Physical Exam   Constitutional: She appears well-developed. She is uncooperative. She does not have a sickly appearance. No distress. Nasal cannula in place.   HENT:   Head: Normocephalic and atraumatic.   Mouth/Throat: No oropharyngeal exudate.   Eyes: Right eye exhibits no discharge. Left eye exhibits no discharge.   Neck: Normal range of motion. Neck supple. No JVD present.   Cardiovascular: Normal rate, regular rhythm, normal heart sounds and intact distal pulses.   No murmur heard.  Pulmonary/Chest: Effort normal and breath sounds normal. She has no wheezes. She has no rhonchi. She has no rales.   Abdominal: Soft. Bowel sounds are normal. She exhibits no distension. There is no tenderness. There is no guarding.   Musculoskeletal: Normal range of motion. She exhibits no edema.   Neurological: She is alert. No cranial nerve deficit.   Skin: Skin is warm and dry. No rash noted. She is not  diaphoretic. No erythema. There is pallor.   Psychiatric: Her speech is normal. Her mood appears not anxious. She does not exhibit a depressed mood.   Nursing note and vitals reviewed.      Significant Labs:  ABGs:   Recent Labs   Lab 11/09/18 0815   PH 7.451*   PCO2 50.1*   HCO3 34.9*   POCSATURATED 65*   BE 11     BMP:   Recent Labs   Lab 11/10/18  0256 11/10/18  1350   * 135*    104   CO2 29 26   BUN 15 18   CREATININE 3.7* 3.9*   CALCIUM 7.4* 7.8*   MG 2.2  --      CBC:   Recent Labs   Lab 11/10/18  0256   WBC 5.43   RBC 3.08*   HGB 8.3*   HCT 28.5*   PLT 65*   MCV 93   MCH 26.9*   MCHC 29.1*     CMP:   Recent Labs   Lab 11/07/18  0312  11/10/18  1350   *   < > 135*   CALCIUM 8.0*   < > 7.8*   ALBUMIN 3.2*   < > 2.6*   PROT 6.0  --   --       < > 142   K 6.5*   < > 3.9   CO2 9*   < > 26   *   < > 104   BUN 54*   < > 18   CREATININE 6.1*   < > 3.9*   ALKPHOS 166*  --   --    ALT 17  --   --    AST 18  --   --    BILITOT 0.3  --   --     < > = values in this interval not displayed.     All labs within the past 24 hours have been reviewed.

## 2018-11-10 NOTE — PROGRESS NOTES
[facility]  Lung Transplant  Progress Note - Critical Care    Patient Name: Juanita Ibarra  MRN: [id number]  Admission Date: 11/7/2018  Hospital Length of Stay: 3 days  Post-Operative Day: 1612  Attending Physician: Jake Alvarez MD  Primary Care Provider: Jake Alvarez MD     Subjective:     Interval History: No acute events overnight. Patient tolerated LP yesterday afternoon without complications. Neuro status with significant improvement overnight. Patient remains alert and oriented x3 this morning. patient able to converse in full sentences and responds appropriately. UOP still minimal but has improved from previous. Current UOP 25 cc/hr. Will plan to leave Goldberg in place to continue to monitor UOP. Patient with complaints of diffuse abdominal pain and nausea overnight. Denies vomiting. Last BM yesterday. Patient also reports chest congestion and dry cough. Oxygen saturation currently stable on RA.     Transfer to TSU this afternoon as nephrology does not feel SLED is needed today. Mother and significant other at bedside and updated on plan of care.       Continuous Infusions:    Scheduled Meds:   acyclovir (ZOVIRAX) IVPB (wt 35 - 70 kg)  5 mg/kg Intravenous Daily    ceFEPime (MAXIPIME) IVPB  1 g Intravenous Daily    diphenhydrAMINE  50 mg Intravenous Once    fluticasone-vilanterol  1 puff Inhalation Daily    methylPREDNISolone sodium succinate  10 mg Intravenous Daily    metoprolol tartrate  25 mg Oral BID    mycophenolate (CELLCEPT) IVPB  500 mg Intravenous Q12H    pantoprozole (PROTONIX) IV  40 mg Intravenous Daily     PRN Meds:sodium chloride, acetaminophen, levalbuterol, magnesium sulfate IVPB **AND** magnesium sulfate IVPB, naloxone, ondansetron, oxyCODONE, potassium chloride 10% **AND** potassium chloride 10% **AND** potassium chloride 10%, potassium phosphate IVPB, promethazine, tiZANidine    Review of patient's allergies indicates:   Allergen Reactions     Albuterol Palpitations    Colistin Anaphylaxis    Vancomycin analogues      Infusion reaction that does not resolve with slowing    Neupogen [filgrastim] Other (See Comments)     Ostealgia after five daily doses of 300 mcg.      Bactrim [sulfamethoxazole-trimethoprim] Hives    Ceftazidime Hives     Pt stated can tolerate cefapine not ceftazidime    Ceftazidime     Dronabinol Other (See Comments)     Mental changes/hallucinations    Haldol [haloperidol lactate] Other (See Comments)     Seizure like activity    Nsaids (non-steroidal anti-inflammatory drug)      Cannot have due to lung transplant    Adhesive Rash     Cloth tape- please use tegaderm or paper tape    Aztreonam Rash    Ciprofloxacin Nausea And Vomiting     Projectile N/V, per patient.  Unwilling to retry therapy.       Review of Systems   Constitutional: Positive for activity change and appetite change. Negative for chills, diaphoresis, fatigue and fever.   HENT: Negative for congestion, rhinorrhea, sore throat and trouble swallowing.    Eyes: Negative for discharge and redness.   Respiratory: Positive for cough. Negative for choking, shortness of breath, wheezing and stridor.    Cardiovascular: Negative for chest pain, palpitations and leg swelling.   Gastrointestinal: Positive for nausea. Negative for abdominal pain, constipation, diarrhea and vomiting.   Endocrine: Negative for polydipsia and polyuria.   Genitourinary: Positive for decreased urine volume. Negative for dysuria, flank pain and urgency.   Allergic/Immunologic: Positive for immunocompromised state.   Neurological: Positive for weakness and headaches. Negative for dizziness, seizures, speech difficulty and numbness.   Hematological: Bruises/bleeds easily.   Psychiatric/Behavioral: Positive for decreased concentration. Negative for behavioral problems, confusion and hallucinations.     Objective:   Physical Exam   Constitutional: She is oriented to person, place, and time. She  appears well-developed and well-nourished.  Non-toxic appearance. She has a sickly appearance. She does not appear ill. No distress.   HENT:   Head: Atraumatic.   Right Ear: External ear normal.   Left Ear: External ear normal.   Nose: Nose normal.   Mouth/Throat: Oropharynx is clear and moist.   Round facies   Eyes: Conjunctivae and EOM are normal. No scleral icterus.   Neck: Normal range of motion. Neck supple. No JVD present. No tracheal deviation present.   Cardiovascular: Regular rhythm, normal heart sounds and intact distal pulses. Tachycardia present. Exam reveals no gallop and no friction rub.   No murmur heard.  Pulmonary/Chest: Effort normal. No stridor. She has no decreased breath sounds. She has no wheezes. She has no rhonchi. She has no rales.   Abdominal: Soft. Bowel sounds are normal. She exhibits no distension. There is tenderness (minimal, diffuse).   Musculoskeletal: Normal range of motion. She exhibits no edema, tenderness or deformity.   Neurological: She is alert and oriented to person, place, and time. No cranial nerve deficit.   Moves all extremities     Skin: Skin is warm and dry. No rash noted. She is not diaphoretic. No erythema. No pallor.   Psychiatric: She has a normal mood and affect. Her behavior is normal. Judgment and thought content normal.   Nursing note and vitals reviewed.        Vital Signs (Most Recent):  Temp: 99.1 °F (37.3 °C) (11/10/18 1101)  Pulse: 72 (11/10/18 1130)  Resp: 18 (11/10/18 1130)  BP: (!) 166/96 (11/10/18 1115)  SpO2: 96 % (11/10/18 1130) Vital Signs (24h Range):  Temp:  [97.6 °F (36.4 °C)-99.8 °F (37.7 °C)] 99.1 °F (37.3 °C)  Pulse:  [51-91] 72  Resp:  [10-43] 18  SpO2:  [91 %-100 %] 96 %  BP: (130-185)/(70-98) 166/96     Weight: 60.7 kg (133 lb 13.1 oz)  Body mass index is 25.28 kg/m².      Intake/Output Summary (Last 24 hours) at 11/10/2018 1148  Last data filed at 11/10/2018 1100  Gross per 24 hour   Intake 940 ml   Output 520 ml   Net 420 ml        Ventilator Data:          Hemodynamic Parameters:       Lines/Drains:       Port A Cath Single Lumen right subclavian (Active)   Dressing Type Transparent 11/8/2018 11:15 AM   Dressing Status Clean;Dry;Intact 11/8/2018 11:15 AM   Dressing Intervention Dressing reinforced 11/8/2018  3:05 AM   Dressing Change Due 10/06/18 10/4/2018  8:00 PM   Line Status Infusing 11/8/2018 11:15 AM   Flush Performed Yes 11/8/2018 11:15 AM   Date to be Reflushed 10/04/18 10/3/2018  8:00 PM   Daily Line Review Performed 11/8/2018 11:15 AM   Type of Needle Nicole 11/8/2018 11:15 AM   Gauge 20 11/8/2018 11:15 AM   Needle Length 3/4 in 10/1/2018  8:00 AM   Needle Status Left in place 11/8/2018 11:15 AM   Needle Insertion Date 09/29/18 10/1/2018  8:00 AM   Needle Insertion Time 1800 10/1/2018  8:00 AM   Needle Removal Date 09/29/18 9/29/2018 11:00 AM   Needle Removal Time 1136 9/29/2018 11:00 AM   Number of days:             Trialysis (Dialysis) Catheter 11/07/18 1023 right femoral (Active)   IV Device Securement sutures 11/8/2018 11:15 AM   Additional Site Signs no drainage;no streak formation;no palpable cord;no pain;no edema;no warmth;no erythema 11/8/2018 11:15 AM   Patency/Care flushed w/o difficulty 11/8/2018 11:15 AM   Site Assessment Clean;Dry;Intact;No redness;No swelling 11/8/2018 11:15 AM   Status Accessed 11/8/2018 11:15 AM   Flows Good 11/8/2018 11:15 AM   Dressing Intervention Dressing reinforced 11/8/2018  3:05 AM   Dressing Status Clean;Dry;Intact;Biopatch in place 11/8/2018 11:15 AM   Dressing Change Due 11/14/18 11/8/2018 11:15 AM   Verification by X-ray Yes 11/7/2018 10:30 AM   Site Condition No complications 11/8/2018 11:15 AM   Dressing Occlusive 11/8/2018 11:15 AM   Daily Line Review Performed 11/8/2018 11:15 AM   Number of days: 1            Peripheral IV - Single Lumen 11/07/18 0200 Right Hand (Active)   Site Assessment Clean;Dry;Intact;No redness;No swelling 11/8/2018 11:15 AM   Line Status Saline locked  "11/8/2018 11:15 AM   Dressing Status Intact;Dry;Clean 11/8/2018 11:15 AM   Dressing Intervention New dressing 11/8/2018  3:05 AM   Dressing Change Due 11/11/18 11/8/2018  3:05 AM   Site Change Due 11/11/18 11/8/2018 11:15 AM   Reason Not Rotated Not due 11/8/2018 11:15 AM   Number of days: 1            Urethral Catheter 11/07/18 0300 (Active)   Site Assessment Clean;Intact 11/8/2018 11:15 AM   Collection Container Urimeter 11/8/2018 11:15 AM   Securement Method secured to top of thigh w/ adhesive device 11/8/2018 11:15 AM   Catheter Care Performed yes 11/8/2018 11:15 AM   Reason for Continuing Urinary Catheterization Critically ill in ICU requiring intensive monitoring 11/8/2018 11:15 AM   CAUTI Prevention Bundle StatLock in place w 1" slack;Intact seal between catheter & drainage tubing;Drainage bag off the floor;Green sheeting clip in use;No dependent loops or kinks;Drainage bag not overfilled (<2/3 full);Drainage bag below bladder 11/8/2018 11:15 AM   Output (mL) 5 mL 11/8/2018 12:00 PM   Number of days: 1       Significant Labs:  CBC:  Recent Labs   Lab 11/10/18  0256   WBC 5.43   RBC 3.08*   HGB 8.3*   HCT 28.5*   PLT 65*   MCV 93   MCH 26.9*   MCHC 29.1*     BMP:  Recent Labs   Lab 11/10/18  0256      K 4.3      CO2 29   BUN 15   CREATININE 3.7*   CALCIUM 7.4*      Tacrolimus Levels:  No results for input(s): TACROLIMUS in the last 72 hours.  Microbiology:  Microbiology Results (last 7 days)     Procedure Component Value Units Date/Time    CSF culture [[id number]] Collected:  11/09/18 1657    Order Status:  Completed Specimen:  CSF (Spinal Fluid) from CSF Tap, Tube 3 Updated:  11/10/18 0707     CSF CULTURE No Growth to date     Gram Stain Result Rare WBC's      No organisms seen    Blood culture [[id number]] Collected:  11/07/18 0200    Order Status:  Completed Specimen:  Blood from Peripheral, Antecubital, Right Updated:  11/10/18 0613     Blood Culture, Routine No Growth to date     Blood Culture, " Routine No Growth to date     Blood Culture, Routine No Growth to date     Blood Culture, Routine No Growth to date    Blood culture [[id number]] Collected:  11/07/18 0252    Order Status:  Completed Specimen:  Blood from Peripheral, Wrist, Right Updated:  11/10/18 0613     Blood Culture, Routine No Growth to date     Blood Culture, Routine No Growth to date     Blood Culture, Routine No Growth to date     Blood Culture, Routine No Growth to date    Cryptococcal antigen, CSF [[id number]] Collected:  11/09/18 1657    Order Status:  Sent Specimen:  CSF (Spinal Fluid) from CSF Tap, Tube 3 Updated:  11/09/18 1809    Fungus culture [[id number]] Collected:  11/09/18 1657    Order Status:  Sent Specimen:  CSF (Spinal Fluid) from CSF Tap, Tube 3 Updated:  11/09/18 1809    Gram stain [[id number]] Collected:  11/09/18 1657    Order Status:  Canceled Specimen:  CSF (Spinal Fluid) from CSF Tap, Tube 3 Updated:  11/09/18 1809    Cryptococcal antigen [[id number]] Collected:  11/08/18 1641    Order Status:  Completed Specimen:  Blood Updated:  11/09/18 1438     Cryptococcal Ag, Blood Negative    Fungus Culture, Blood or Bone Marrow [[id number]] Collected:  11/07/18 1715    Order Status:  Completed Specimen:  Blood Updated:  11/08/18 1323     Fungus Cult, blood or BM Culture in progress    Respiratory Viral Panel by PCR [facility]; Nasal Swab [[id number]] Collected:  11/07/18 0346    Order Status:  Completed Specimen:  Respiratory Updated:  11/08/18 1212     Respiratory Virus Panel, source Nasal Swab     RVP - Adenovirus Not Detected     Comment: Detects Serotypes B and E. Detection of Serotype C may   be limited. If Adenovirus infection is suspected and a   Not Detected result is returned the sample should be   re-tested for Adenovirus using an independent method  (e.g. [employer] Adenovirus Quantitative Real-Time  PCR test.          Enterovirus Not Detected     Comment: Cross-reactivity has been observed between certain  Rhinovirus  strains and the Enterovirus assay.          Human Bocavirus Not Detected     Human Coronavirus Not Detected     Comment: The Human Coronavirus assay detects Human coronavirus types  229E, OC43,NL63 and HKU1.          RVP - Human Metapneumovirus (hMPV) Not Detected     RVP - Influenza A Not Detected     Influenza A - Q1G7-61 Not Detected     RVP - Influenza B Not Detected     Parainfluenza Not Detected     Respiratory Syncytial VirusVirus (RSV) A Not Detected     Comment: The Respiratory Syncytial Viral assay detects types A and B,  however it does not distinguish between the two.          RVP - Rhinovirus Not Detected     Comment: Cross-Reactivity has been observed between certain   Rhinovirus strains and the Enterovirus assay.  Target Enriched Mulitplex Polymerase Chain Reaction (TEM-PCR)  allows for the detection of multiple pathogens out of a single  reaction.  This test was developed and its performance   characteristics determined by [employer].  It has not   been cleared or approved by the U.S.Food and Drug Administration.  Results should be used in conjunction with clinical findings,   and should not form the sole basis for a diagnosis or treatment  decision.  TEM-PCR is a licensed technology of [employer].         Narrative:       Receiving Lab:->[facility]    Culture, Respiratory with Gram Stain [[id number]]     Order Status:  Canceled Specimen:  Respiratory from Sputum, Expectorated           I have reviewed all pertinent labs within the past 24 hours.    Diagnostic Results:  Chest X-Ray: I personally reviewed the films and findings are: no acute process        Assessment/Plan:     * Acute metabolic encephalopathy    Likely secondary to acute renal failure and hypoventilation related to opioid overdose. Received SLED 11/7 with slight improvement in mental status.     Mental status has returned to baseline this morning. Will continue to closely monitor neuro status.   Continue  acyclovir for concern for viral meningitis. Previous HSV studies in 2013 negative. Varicella IgG positive in 2013. CT Head 11/8 negative for acute process. LP on 11/9 - cultures pending.        Sepsis    Patient presented with AMS with LISBETH, severe anion gap metabolic acidosis, hypotension and hyperkalemia. Patient's  reporting headaches and poor PO intake prior to admission. Placed on empiric Vanc/Zosyn upon admission. Urine and blood cultures negative. Concerns for sepsis from unknown source. Transitioned to Acyclovir/Cefepime on 11/8 due to concerns for viral meningitis and for increased CNS penetration. Patient has remained afebrile over the last 24 hours.     Empiric acyclovir started for viral meningitis. CT Head 11/8 with no acute process or ICH noted.  LP 11/9 cultures pending.  Continue supportive care. Will plan to re-introduce home regimen of pain medications with oxycodone IR 5 mg Q4hrs PRN pain. Continuing to avoid benzodiazepine medications to avoid further delirium. ID following, appreciate recs.      Acute kidney failure    Likely multifactorial 2/2 poor PO intake from acute illness coupled with nephrotoxic medications. Initially received 4L LR with bicarb gtt with minimal UOP. Received SLED 11/7 and discontinued 11/8 due to resolution of acidosis and hyperkalemia.     Cr 3.7 today. Will plan to hold tacrolimus. Continue to closely monitor UOP with Goldberg. Currently 25cc/hr, improved from previous. No need for urgent CRRT or SLED today. Nephrology following, appreciate recs.      Lung transplant status, bilateral    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     Will hold tacrolimus in setting of acute renal failure. Will plan to continue immunosuppression with MMF and prednisone.      Bronchiolitis obliterans syndrome    FEV1 with persistent  decline on outpatient PFTs. Currently on 2L NC. Her acute respiratory failure likely secondary to opioid overdose + acute renal failure. Received narcan x 2 en route [place] [facility] and was placed on narcan gtt with minimal improvement in mental status. Mental status has improved following round of CRRT and sodium bicarb gtt.     Mental status currently at baseline. Encouraged patient to begin to ambulate as tolerated. Currently requires 2L NC intermittently. Wean oxygen to maintain O2 sats >88%.      Immunosuppression    Current outpatient regimen is tacrolimus 3.5 mg BID, prednisone 5 mg daily, and  mg BID.    Will continue to hold tacrolimus in setting of acute renal failure. Repeat tacrolimus level tomorrow for monitoring of level. Will plan to resume tacrolimus in the coming days.  Will plan to transition to oral regimen of prednisone 5 mg daily and  mg BID.      Prophylactic antibiotic    On dapsone 100 mg daily outpatient. Will plan to resume tomorrow.      Chronic pain with opiate use    Opioid overdose + acute renal failure from poor PO intake likely cause for acute respiratory failure and encephalopathy. Hold all sedating medications for now.     Will use IV Zofran and Phenergan at this time for relief from any opiate withdrawal symptoms. Tylenol PRN and oxycodone IR 5 mg PRN every 4 hours for pain relief. Will plan to transition to remaining home opioid regimen in the coming days while monitoring renal function and mental status.      CF related Pancreatic insufficiency    Previously on Creon with meals and snacks.     Bedside nursing swallow evaluation overnight with no concern for aspiration. Will begin with clear liquid diet and advance to renal diet. Will add on Creon once appropriate.          Preventive Measures: Stress Ulcer: continue prophyllaxis    Counseling/Consultation:I discussed the case with Dr. Alvarez., I discussed the patient's condition/prognosis with the family.      Jacqueline SHEN  DYLON Dumont  Lung Transplant  [facility]

## 2018-11-10 NOTE — ASSESSMENT & PLAN NOTE
Previously on Creon with meals and snacks.     Bedside nursing swallow evaluation overnight with no concern for aspiration. Will begin with clear liquid diet and advance to renal diet. Will add on Creon once appropriate.

## 2018-11-10 NOTE — ASSESSMENT & PLAN NOTE
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     Will hold tacrolimus in setting of acute renal failure. Will plan to continue immunosuppression with MMF and prednisone.

## 2018-11-10 NOTE — ASSESSMENT & PLAN NOTE
Likely multifactorial 2/2 poor PO intake from acute illness coupled with nephrotoxic medications. Initially received 4L LR with bicarb gtt with minimal UOP. Received SLED 11/7 and discontinued 11/8 due to resolution of acidosis and hyperkalemia.     Cr 3.7 today. Will plan to hold tacrolimus. Continue to closely monitor UOP with Goldberg. Currently 25cc/hr, improved from previous. No need for urgent CRRT or SLED today. Nephrology following, appreciate recs.

## 2018-11-10 NOTE — NURSING
Pt refusing BG at this time. Pt states she is not eating until she can have her phenergan which will be @ 1950.

## 2018-11-10 NOTE — PLAN OF CARE
"Problem: Patient Care Overview  Goal: Plan of Care Review  Outcome: Ongoing (interventions implemented as appropriate)  Patient is A x O x 4/ speaking in clear and complete sentences, but has been anxious during the shift. She is afebrile/ MAEW, but is "tired." She c/o lower chronic back pain which I medicated with zanaflex and tylenol as ordered/moderate relief; HR 50-70's NSR during the shift/ 50's while sleeping, BP's 150-160's , trace edema/ext, no ectopy noted; sats > 97 % on RA during the shift / 1 L/min NC with sleep; saleh UOP 10-20/hr clear/yellow/ pt preogressing toward goals/discussed PCO with pt and family; cont to monitor. TKoppersmithRN.      "

## 2018-11-10 NOTE — ASSESSMENT & PLAN NOTE
Opioid overdose + acute renal failure from poor PO intake likely cause for acute respiratory failure and encephalopathy. Hold all sedating medications for now.     Will use IV Zofran and Phenergan at this time for relief from any opiate withdrawal symptoms. Tylenol PRN and oxycodone IR 5 mg PRN every 4 hours for pain relief. Will plan to transition to remaining home opioid regimen in the coming days while monitoring renal function and mental status.

## 2018-11-10 NOTE — SUBJECTIVE & OBJECTIVE
Subjective:     Interval History: No acute events overnight. Patient tolerated LP yesterday afternoon without complications. Neuro status with significant improvement overnight. Patient remains alert and oriented x3 this morning. patient able to converse in full sentences and responds appropriately. UOP still minimal but has improved from previous. Current UOP 25 cc/hr. Will plan to leave Goldberg in place to continue to monitor UOP. Patient with complaints of diffuse abdominal pain and nausea overnight. Denies vomiting. Last BM yesterday. Patient also reports chest congestion and dry cough. Oxygen saturation currently stable on RA.     Transfer to [facility] this afternoon as nephrology does not feel SLED is needed today. Mother and significant other at bedside and updated on plan of care.       Continuous Infusions:    Scheduled Meds:   acyclovir (ZOVIRAX) IVPB (wt 35 - 70 kg)  5 mg/kg Intravenous Daily    ceFEPime (MAXIPIME) IVPB  1 g Intravenous Daily    diphenhydrAMINE  50 mg Intravenous Once    fluticasone-vilanterol  1 puff Inhalation Daily    methylPREDNISolone sodium succinate  10 mg Intravenous Daily    metoprolol tartrate  25 mg Oral BID    mycophenolate (CELLCEPT) IVPB  500 mg Intravenous Q12H    pantoprozole (PROTONIX) IV  40 mg Intravenous Daily     PRN Meds:sodium chloride, acetaminophen, levalbuterol, magnesium sulfate IVPB **AND** magnesium sulfate IVPB, naloxone, ondansetron, oxyCODONE, potassium chloride 10% **AND** potassium chloride 10% **AND** potassium chloride 10%, potassium phosphate IVPB, promethazine, tiZANidine    Review of patient's allergies indicates:   Allergen Reactions    Albuterol Palpitations    Colistin Anaphylaxis    Vancomycin analogues      Infusion reaction that does not resolve with slowing    Neupogen [filgrastim] Other (See Comments)     Ostealgia after five daily doses of 300 mcg.      Bactrim [sulfamethoxazole-trimethoprim] Hives    Ceftazidime Hives     Pt stated  can tolerate cefapine not ceftazidime    Ceftazidime     Dronabinol Other (See Comments)     Mental changes/hallucinations    Haldol [haloperidol lactate] Other (See Comments)     Seizure like activity    Nsaids (non-steroidal anti-inflammatory drug)      Cannot have due to lung transplant    Adhesive Rash     Cloth tape- please use tegaderm or paper tape    Aztreonam Rash    Ciprofloxacin Nausea And Vomiting     Projectile N/V, per patient.  Unwilling to retry therapy.       Review of Systems   Constitutional: Positive for activity change and appetite change. Negative for chills, diaphoresis, fatigue and fever.   HENT: Negative for congestion, rhinorrhea, sore throat and trouble swallowing.    Eyes: Negative for discharge and redness.   Respiratory: Positive for cough. Negative for choking, shortness of breath, wheezing and stridor.    Cardiovascular: Negative for chest pain, palpitations and leg swelling.   Gastrointestinal: Positive for nausea. Negative for abdominal pain, constipation, diarrhea and vomiting.   Endocrine: Negative for polydipsia and polyuria.   Genitourinary: Positive for decreased urine volume. Negative for dysuria, flank pain and urgency.   Allergic/Immunologic: Positive for immunocompromised state.   Neurological: Positive for weakness and headaches. Negative for dizziness, seizures, speech difficulty and numbness.   Hematological: Bruises/bleeds easily.   Psychiatric/Behavioral: Positive for decreased concentration. Negative for behavioral problems, confusion and hallucinations.     Objective:   Physical Exam   Constitutional: She is oriented to person, place, and time. She appears well-developed and well-nourished.  Non-toxic appearance. She has a sickly appearance. She does not appear ill. No distress.   HENT:   Head: Atraumatic.   Right Ear: External ear normal.   Left Ear: External ear normal.   Nose: Nose normal.   Mouth/Throat: Oropharynx is clear and moist.   Round facies    Eyes: Conjunctivae and EOM are normal. No scleral icterus.   Neck: Normal range of motion. Neck supple. No JVD present. No tracheal deviation present.   Cardiovascular: Regular rhythm, normal heart sounds and intact distal pulses. Tachycardia present. Exam reveals no gallop and no friction rub.   No murmur heard.  Pulmonary/Chest: Effort normal. No stridor. She has no decreased breath sounds. She has no wheezes. She has no rhonchi. She has no rales.   Abdominal: Soft. Bowel sounds are normal. She exhibits no distension. There is tenderness (minimal, diffuse).   Musculoskeletal: Normal range of motion. She exhibits no edema, tenderness or deformity.   Neurological: She is alert and oriented to person, place, and time. No cranial nerve deficit.   Moves all extremities     Skin: Skin is warm and dry. No rash noted. She is not diaphoretic. No erythema. No pallor.   Psychiatric: She has a normal mood and affect. Her behavior is normal. Judgment and thought content normal.   Nursing note and vitals reviewed.        Vital Signs (Most Recent):  Temp: 99.1 °F (37.3 °C) (11/10/18 1101)  Pulse: 72 (11/10/18 1130)  Resp: 18 (11/10/18 1130)  BP: (!) 166/96 (11/10/18 1115)  SpO2: 96 % (11/10/18 1130) Vital Signs (24h Range):  Temp:  [97.6 °F (36.4 °C)-99.8 °F (37.7 °C)] 99.1 °F (37.3 °C)  Pulse:  [51-91] 72  Resp:  [10-43] 18  SpO2:  [91 %-100 %] 96 %  BP: (130-185)/(70-98) 166/96     Weight: 60.7 kg (133 lb 13.1 oz)  Body mass index is 25.28 kg/m².      Intake/Output Summary (Last 24 hours) at 11/10/2018 1148  Last data filed at 11/10/2018 1100  Gross per 24 hour   Intake 940 ml   Output 520 ml   Net 420 ml       Ventilator Data:          Hemodynamic Parameters:       Lines/Drains:       Port A Cath Single Lumen right subclavian (Active)   Dressing Type Transparent 11/8/2018 11:15 AM   Dressing Status Clean;Dry;Intact 11/8/2018 11:15 AM   Dressing Intervention Dressing reinforced 11/8/2018  3:05 AM   Dressing Change Due  10/06/18 10/4/2018  8:00 PM   Line Status Infusing 11/8/2018 11:15 AM   Flush Performed Yes 11/8/2018 11:15 AM   Date to be Reflushed 10/04/18 10/3/2018  8:00 PM   Daily Line Review Performed 11/8/2018 11:15 AM   Type of Needle Nicole 11/8/2018 11:15 AM   Gauge 20 11/8/2018 11:15 AM   Needle Length 3/4 in 10/1/2018  8:00 AM   Needle Status Left in place 11/8/2018 11:15 AM   Needle Insertion Date 09/29/18 10/1/2018  8:00 AM   Needle Insertion Time 1800 10/1/2018  8:00 AM   Needle Removal Date 09/29/18 9/29/2018 11:00 AM   Needle Removal Time 1136 9/29/2018 11:00 AM   Number of days:             Trialysis (Dialysis) Catheter 11/07/18 1023 right femoral (Active)   IV Device Securement sutures 11/8/2018 11:15 AM   Additional Site Signs no drainage;no streak formation;no palpable cord;no pain;no edema;no warmth;no erythema 11/8/2018 11:15 AM   Patency/Care flushed w/o difficulty 11/8/2018 11:15 AM   Site Assessment Clean;Dry;Intact;No redness;No swelling 11/8/2018 11:15 AM   Status Accessed 11/8/2018 11:15 AM   Flows Good 11/8/2018 11:15 AM   Dressing Intervention Dressing reinforced 11/8/2018  3:05 AM   Dressing Status Clean;Dry;Intact;Biopatch in place 11/8/2018 11:15 AM   Dressing Change Due 11/14/18 11/8/2018 11:15 AM   Verification by X-ray Yes 11/7/2018 10:30 AM   Site Condition No complications 11/8/2018 11:15 AM   Dressing Occlusive 11/8/2018 11:15 AM   Daily Line Review Performed 11/8/2018 11:15 AM   Number of days: 1            Peripheral IV - Single Lumen 11/07/18 0200 Right Hand (Active)   Site Assessment Clean;Dry;Intact;No redness;No swelling 11/8/2018 11:15 AM   Line Status Saline locked 11/8/2018 11:15 AM   Dressing Status Intact;Dry;Clean 11/8/2018 11:15 AM   Dressing Intervention New dressing 11/8/2018  3:05 AM   Dressing Change Due 11/11/18 11/8/2018  3:05 AM   Site Change Due 11/11/18 11/8/2018 11:15 AM   Reason Not Rotated Not due 11/8/2018 11:15 AM   Number of days: 1            Urethral Catheter  "11/07/18 0300 (Active)   Site Assessment Clean;Intact 11/8/2018 11:15 AM   Collection Container Urimeter 11/8/2018 11:15 AM   Securement Method secured to top of thigh w/ adhesive device 11/8/2018 11:15 AM   Catheter Care Performed yes 11/8/2018 11:15 AM   Reason for Continuing Urinary Catheterization Critically ill in ICU requiring intensive monitoring 11/8/2018 11:15 AM   CAUTI Prevention Bundle StatLock in place w 1" slack;Intact seal between catheter & drainage tubing;Drainage bag off the floor;Green sheeting clip in use;No dependent loops or kinks;Drainage bag not overfilled (<2/3 full);Drainage bag below bladder 11/8/2018 11:15 AM   Output (mL) 5 mL 11/8/2018 12:00 PM   Number of days: 1       Significant Labs:  CBC:  Recent Labs   Lab 11/10/18  0256   WBC 5.43   RBC 3.08*   HGB 8.3*   HCT 28.5*   PLT 65*   MCV 93   MCH 26.9*   MCHC 29.1*     BMP:  Recent Labs   Lab 11/10/18  0256      K 4.3      CO2 29   BUN 15   CREATININE 3.7*   CALCIUM 7.4*      Tacrolimus Levels:  No results for input(s): TACROLIMUS in the last 72 hours.  Microbiology:  Microbiology Results (last 7 days)     Procedure Component Value Units Date/Time    CSF culture [[id number]] Collected:  11/09/18 1657    Order Status:  Completed Specimen:  CSF (Spinal Fluid) from CSF Tap, Tube 3 Updated:  11/10/18 0707     CSF CULTURE No Growth to date     Gram Stain Result Rare WBC's      No organisms seen    Blood culture [[id number]] Collected:  11/07/18 0200    Order Status:  Completed Specimen:  Blood from Peripheral, Antecubital, Right Updated:  11/10/18 0613     Blood Culture, Routine No Growth to date     Blood Culture, Routine No Growth to date     Blood Culture, Routine No Growth to date     Blood Culture, Routine No Growth to date    Blood culture [[id number]] Collected:  11/07/18 0252    Order Status:  Completed Specimen:  Blood from Peripheral, Wrist, Right Updated:  11/10/18 0613     Blood Culture, Routine No Growth to date     " Blood Culture, Routine No Growth to date     Blood Culture, Routine No Growth to date     Blood Culture, Routine No Growth to date    Cryptococcal antigen, CSF [[id number]] Collected:  11/09/18 1657    Order Status:  Sent Specimen:  CSF (Spinal Fluid) from CSF Tap, Tube 3 Updated:  11/09/18 1809    Fungus culture [[id number]] Collected:  11/09/18 1657    Order Status:  Sent Specimen:  CSF (Spinal Fluid) from CSF Tap, Tube 3 Updated:  11/09/18 1809    Gram stain [[id number]] Collected:  11/09/18 1657    Order Status:  Canceled Specimen:  CSF (Spinal Fluid) from CSF Tap, Tube 3 Updated:  11/09/18 1809    Cryptococcal antigen [[id number]] Collected:  11/08/18 1641    Order Status:  Completed Specimen:  Blood Updated:  11/09/18 1438     Cryptococcal Ag, Blood Negative    Fungus Culture, Blood or Bone Marrow [[id number]] Collected:  11/07/18 1715    Order Status:  Completed Specimen:  Blood Updated:  11/08/18 1323     Fungus Cult, blood or BM Culture in progress    Respiratory Viral Panel by PCR [facility]; Nasal Swab [[id number]] Collected:  11/07/18 0346    Order Status:  Completed Specimen:  Respiratory Updated:  11/08/18 1212     Respiratory Virus Panel, source Nasal Swab     RVP - Adenovirus Not Detected     Comment: Detects Serotypes B and E. Detection of Serotype C may   be limited. If Adenovirus infection is suspected and a   Not Detected result is returned the sample should be   re-tested for Adenovirus using an independent method  (e.g. [employer] Adenovirus Quantitative Real-Time  PCR test.          Enterovirus Not Detected     Comment: Cross-reactivity has been observed between certain Rhinovirus  strains and the Enterovirus assay.          Human Bocavirus Not Detected     Human Coronavirus Not Detected     Comment: The Human Coronavirus assay detects Human coronavirus types  229E, OC43,NL63 and HKU1.          RVP - Human Metapneumovirus (hMPV) Not Detected     RVP - Influenza A Not Detected     Influenza A -  M1W8-07 Not Detected     RVP - Influenza B Not Detected     Parainfluenza Not Detected     Respiratory Syncytial VirusVirus (RSV) A Not Detected     Comment: The Respiratory Syncytial Viral assay detects types A and B,  however it does not distinguish between the two.          RVP - Rhinovirus Not Detected     Comment: Cross-Reactivity has been observed between certain   Rhinovirus strains and the Enterovirus assay.  Target Enriched Mulitplex Polymerase Chain Reaction (TEM-PCR)  allows for the detection of multiple pathogens out of a single  reaction.  This test was developed and its performance   characteristics determined by [employer].  It has not   been cleared or approved by the U.S.Food and Drug Administration.  Results should be used in conjunction with clinical findings,   and should not form the sole basis for a diagnosis or treatment  decision.  TEM-PCR is a licensed technology of [employer].         Narrative:       Receiving Lab:->[facility]    Culture, Respiratory with Gram Stain [[id number]]     Order Status:  Canceled Specimen:  Respiratory from Sputum, Expectorated           I have reviewed all pertinent labs within the past 24 hours.    Diagnostic Results:  Chest X-Ray: I personally reviewed the films and findings are: no acute process

## 2018-11-10 NOTE — PLAN OF CARE
Problem: Patient Care Overview  Goal: Plan of Care Review  Outcome: Ongoing (interventions implemented as appropriate)  POC reviewed with Pt and family. Pt is awake and alert. Pt follows commands. Pt has been restless all day. Pt is oriented to self. Pt HR has been 70s-80s NSR. SBP has been 140s-160s. IV antibiotics given.  Pt O2 sats stable on 1L NC.  Goldberg intact with 10-20cc/hr MD aware. Pt had lumbar puncture this PM and tolerated well. Pt received 2mg of ativan. Pt resting comfortably since then.  Family at bedside. Pt Tmax is 99.8. 1 unit PRBC given. Pt responded well. No BMs today. Pt has no current skin breakdown. Pt turned Q2. Will continue to monitor.

## 2018-11-10 NOTE — ASSESSMENT & PLAN NOTE
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 Patient presents as a transfer from [facility] in [place] on 11/7 after presenting there with a 3 day history of lethargy and dizziness per spouse. She was transferred to [facility] via flight care team overnight. She was started on a bicarb gtt 2/2 severe metabolic acidosis and narcan drip 2/2 hypercapnic respiratory failure. ABG on admit was 6.9/59 and her bicarb was 12. ABG improved to 7.2/48.6 and bicarb to 21.8 since being on bicarb drip. Patient on 2 L via NC. The patient was hyperkalemic, 6.5, on admit, and was shifted. Potasium 5.1 after medications. Nephrology consulted for LISBETH and severe metabolic acidosis. The patient's spouse denies a history of kidney issues. Her baseline SCr from 2015 to 2018 has been around ~ 1.0. Her appointment visits in October revealed a SCr of 1.5 and 1.8. The patient was admitted with a SCr of 6.1. Urine outputt has been between 10-30 ml/hr. She put out a total of 300 ml during night shift. UA today with 2+ protein. UPCR pending. LISBETH secondary to ATN likely caused by hypotension/sepsis in the setting of severe metabolic acidosis.     Plan:   Will continue to hold on dialysis for today   Maintain urinary catheter for strict I/Os. Low UOP, 470 ml/24h  Hgb 8.3 today  Renal US completed on 11/7/18, showing reduced perfusion bilaterally   Urine creatinine/protein ratio was 0.51  Avoid NSAIDs, contrast, nephrotoxins   Monitor strict I/Os, daily weights  Renally dose medications to current GFR

## 2018-11-10 NOTE — ASSESSMENT & PLAN NOTE
Likely secondary to acute renal failure and hypoventilation related to opioid overdose. Received SLED 11/7 with slight improvement in mental status.     Mental status has returned to baseline this morning. Will continue to closely monitor neuro status.   Continue acyclovir for concern for viral meningitis. Previous HSV studies in 2013 negative. Varicella IgG positive in 2013. CT Head 11/8 negative for acute process. LP on 11/9 - cultures pending.

## 2018-11-10 NOTE — ASSESSMENT & PLAN NOTE
Patient presented with AMS with LISBETH, severe anion gap metabolic acidosis, hypotension and hyperkalemia. Patient's  reporting headaches and poor PO intake prior to admission. Placed on empiric Vanc/Zosyn upon admission. Urine and blood cultures negative. Concerns for sepsis from unknown source. Transitioned to Acyclovir/Cefepime on 11/8 due to concerns for viral meningitis and for increased CNS penetration. Patient has remained afebrile over the last 24 hours.     Empiric acyclovir started for viral meningitis. CT Head 11/8 with no acute process or ICH noted.  LP 11/9 cultures pending.  Continue supportive care. Will plan to re-introduce home regimen of pain medications with oxycodone IR 5 mg Q4hrs PRN pain. Continuing to avoid benzodiazepine medications to avoid further delirium. ID following, appreciate recs.

## 2018-11-10 NOTE — ASSESSMENT & PLAN NOTE
Current outpatient regimen is tacrolimus 3.5 mg BID, prednisone 5 mg daily, and  mg BID.    Will continue to hold tacrolimus in setting of acute renal failure. Repeat tacrolimus level tomorrow for monitoring of level. Will plan to resume tacrolimus in the coming days.  Will plan to transition to oral regimen of prednisone 5 mg daily and  mg BID.

## 2018-11-10 NOTE — PROGRESS NOTES
[facility]  Nephrology  Progress Note    Patient Name: Juanita Ibarra  MRN: [id number]  Admission Date: 11/7/2018  Hospital Length of Stay: 3 days  Attending Provider: Jake Alvarez MD   Primary Care Physician: Jake Alvarez MD  Principal Problem:Acute metabolic encephalopathy    Subjective:     HPI: The patient is a  28 year-old  female with a medical history of BLT secondary to CF. 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 Patient presents as a transfer from [facility] in [place] on 11/7 after presenting there with a 3 day history of lethargy and dizziness per spouse. The patient has a history of multiple hospitalizations 2/2 PNA and/or dyspnea over the last couple of months. The patient was somnolent during assessment, therefore, majority of HPI is from her spouse and records. The spouse states that the patient had been feeling nauseated for the last 3 days. He states that she also stopped eating during that time. Despite her decreased appetite, the spouse continued to give her all of her prescribed medications medications including her Morphine as normal. On admit to the OSH, her intial BP was 83/52, , and SPO2 was 95% on ?room air. According to the records, she also had a ?LISBETH and was hyperkalemic. She was given calcium gluconate, insulin, D50, and Kayexalate at the OSH. The patient was also given a total of 4 L of ?IVFs for her BP and was started on Zosyn. She was then transferred to [facility] via flight care team overnight for an higher level of care. She was started on a bicarb gtt 2/2 severe metabolic acidosis and narcan drip 2/2 hypercapnic respiratory failure. ABG on admit was 6.9/59 and her bicarb was 12. ABG has improved to 7.2/48.6 and bicarb to 21.8 since being on bicarb drip. Patient on 2 L via  "NC.  The patient was hyperkalemic, 6.5, on admit, and was shifted. Potasium 5.1 after medications. Nephrology consulted for LISBETH and severe metabolic acidosis. The patient's spouse denies a history of kidney issues. Her baseline SCr from 2015 to 2018 has been around ~ 1.0. Her last appointment visits in October revealed a SCr of 1.5 and 1.8. The patient's  did state that a couple of months ago the patient was found to have a LISBETH during a outpatient appointment in [place]. He states that she was sent to ED and was given fluids and discharged home. He denied any issues around that time leading to her LISBETH and states it was "randomly" found on labs. The patient was admitted with a SCr of 6.1. Urine output has been between 10-30 ml/hr. She put out a total of 300 ml during night shift.     Nephrology consulted for management of LISBETH and metabolic acidosis.         Interval History:   Patient evaluated at bedside, no significant event overnight.     Review of patient's allergies indicates:   Allergen Reactions    Albuterol Palpitations    Colistin Anaphylaxis    Vancomycin analogues      Infusion reaction that does not resolve with slowing    Neupogen [filgrastim] Other (See Comments)     Ostealgia after five daily doses of 300 mcg.      Bactrim [sulfamethoxazole-trimethoprim] Hives    Ceftazidime Hives     Pt stated can tolerate cefapine not ceftazidime    Ceftazidime     Dronabinol Other (See Comments)     Mental changes/hallucinations    Haldol [haloperidol lactate] Other (See Comments)     Seizure like activity    Nsaids (non-steroidal anti-inflammatory drug)      Cannot have due to lung transplant    Adhesive Rash     Cloth tape- please use tegaderm or paper tape    Aztreonam Rash    Ciprofloxacin Nausea And Vomiting     Projectile N/V, per patient.  Unwilling to retry therapy.     Current Facility-Administered Medications   Medication Frequency    acetaminophen tablet 1,000 mg Q6H PRN    " acyclovir (ZOVIRAX) 290 mg in dextrose 5 % 100 mL IVPB Daily    [START ON 11/11/2018] ARIPiprazole tablet 2 mg Daily    ceFEPIme injection 1 g Daily    enoxaparin injection 30 mg Daily    [START ON 11/11/2018] fluticasone 50 mcg/actuation nasal spray 100 mcg Daily    fluticasone-vilanterol 100-25 mcg/dose diskus inhaler 1 puff Daily    levalbuterol nebulizer solution 1.25 mg Q8H PRN    magnesium sulfate 2g in water 50mL IVPB (premix) PRN    And    magnesium sulfate 2g in water 50mL IVPB (premix) PRN    methylPREDNISolone sodium succinate injection 10 mg Daily    metoprolol tartrate (LOPRESSOR) tablet 25 mg BID    mycophenolate (CELLCEPT) 500 mg in dextrose 5 % 100 mL IVPB BID    naloxone 0.4 mg/mL injection 0.4 mg PRN    nystatin 100,000 unit/mL suspension 500,000 Units TID    ondansetron injection 8 mg Q6H PRN    oxyCODONE immediate release tablet 5 mg Q4H PRN    [START ON 11/11/2018] pantoprazole EC tablet 40 mg Daily    potassium chloride 10% oral solution 40 mEq PRN    And    potassium chloride 10% oral solution 40 mEq PRN    And    potassium chloride 10% oral solution 60 mEq PRN    promethazine (PHENERGAN) 25 mg in dextrose 5 % 50 mL IVPB Q6H PRN    tiZANidine tablet 8 mg Q6H PRN       Objective:     Vital Signs (Most Recent):  Temp: 100 °F (37.8 °C) (11/10/18 1521)  Pulse: 81 (11/10/18 1555)  Resp: 18 (11/10/18 1521)  BP: (!) 168/86(FELICIA felix made aware) (11/10/18 1521)  SpO2: 95 % (11/10/18 1521)  O2 Device (Oxygen Therapy): room air (11/10/18 1521) Vital Signs (24h Range):  Temp:  [97.6 °F (36.4 °C)-100 °F (37.8 °C)] 100 °F (37.8 °C)  Pulse:  [51-81] 81  Resp:  [10-29] 18  SpO2:  [92 %-100 %] 95 %  BP: (137-185)/(70-96) 168/86     Weight: 60.7 kg (133 lb 13.1 oz) (11/09/18 0500)  Body mass index is 25.28 kg/m².  Body surface area is 1.62 meters squared.    I/O last 3 completed shifts:  In: 1057.2 [I.V.:507.2; Blood:350; IV Piggyback:200]  Out: 570 [Urine:570]    Physical Exam    Constitutional: She appears well-developed. She is uncooperative. She does not have a sickly appearance. No distress. Nasal cannula in place.   HENT:   Head: Normocephalic and atraumatic.   Mouth/Throat: No oropharyngeal exudate.   Eyes: Right eye exhibits no discharge. Left eye exhibits no discharge.   Neck: Normal range of motion. Neck supple. No JVD present.   Cardiovascular: Normal rate, regular rhythm, normal heart sounds and intact distal pulses.   No murmur heard.  Pulmonary/Chest: Effort normal and breath sounds normal. She has no wheezes. She has no rhonchi. She has no rales.   Abdominal: Soft. Bowel sounds are normal. She exhibits no distension. There is no tenderness. There is no guarding.   Musculoskeletal: Normal range of motion. She exhibits no edema.   Neurological: She is alert. No cranial nerve deficit.   Skin: Skin is warm and dry. No rash noted. She is not diaphoretic. No erythema. There is pallor.   Psychiatric: Her speech is normal. Her mood appears not anxious. She does not exhibit a depressed mood.   Nursing note and vitals reviewed.      Significant Labs:  ABGs:   Recent Labs   Lab 11/09/18  0815   PH 7.451*   PCO2 50.1*   HCO3 34.9*   POCSATURATED 65*   BE 11     BMP:   Recent Labs   Lab 11/10/18  0256 11/10/18  1350   * 135*    104   CO2 29 26   BUN 15 18   CREATININE 3.7* 3.9*   CALCIUM 7.4* 7.8*   MG 2.2  --      CBC:   Recent Labs   Lab 11/10/18  0256   WBC 5.43   RBC 3.08*   HGB 8.3*   HCT 28.5*   PLT 65*   MCV 93   MCH 26.9*   MCHC 29.1*     CMP:   Recent Labs   Lab 11/07/18  0312  11/10/18  1350   *   < > 135*   CALCIUM 8.0*   < > 7.8*   ALBUMIN 3.2*   < > 2.6*   PROT 6.0  --   --       < > 142   K 6.5*   < > 3.9   CO2 9*   < > 26   *   < > 104   BUN 54*   < > 18   CREATININE 6.1*   < > 3.9*   ALKPHOS 166*  --   --    ALT 17  --   --    AST 18  --   --    BILITOT 0.3  --   --     < > = values in this interval not displayed.     All labs within the  past 24 hours have been reviewed.       Assessment/Plan:     Acute kidney failure    The patient is a  28 year-old  female with medical history of BLT secondary to CF. 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 Patient presents as a transfer from [facility] in [place] on 11/7 after presenting there with a 3 day history of lethargy and dizziness per spouse. She was transferred to [facility] via flight care team overnight. She was started on a bicarb gtt 2/2 severe metabolic acidosis and narcan drip 2/2 hypercapnic respiratory failure. ABG on admit was 6.9/59 and her bicarb was 12. ABG improved to 7.2/48.6 and bicarb to 21.8 since being on bicarb drip. Patient on 2 L via NC. The patient was hyperkalemic, 6.5, on admit, and was shifted. Potasium 5.1 after medications. Nephrology consulted for LISBETH and severe metabolic acidosis. The patient's spouse denies a history of kidney issues. Her baseline SCr from 2015 to 2018 has been around ~ 1.0. Her appointment visits in October revealed a SCr of 1.5 and 1.8. The patient was admitted with a SCr of 6.1. Urine outputt has been between 10-30 ml/hr. She put out a total of 300 ml during night shift. UA today with 2+ protein. UPCR pending. LISBETH secondary to ATN likely caused by hypotension/sepsis in the setting of severe metabolic acidosis.     Plan:   Will continue to hold on dialysis for today   Maintain urinary catheter for strict I/Os. Low UOP, 470 ml/24h  Hgb 8.3 today  Renal US completed on 11/7/18, showing reduced perfusion bilaterally   Urine creatinine/protein ratio was 0.51  Avoid NSAIDs, contrast, nephrotoxins   Monitor strict I/Os, daily weights  Renally dose medications to current GFR         Carlos Pacheco  Nephrology  Fellow  [facility]    Pager  [phone]    ATTENDING PHYSICIAN ATTESTATION  I have personally interviewed and examined the patient. I thoroughly reviewed the demographic, clinical, laboratorial and imaging information available in medical records. I agree with the assessment and recommendations provided by the subspecialty resident. Dr. Lin was under my supervision.

## 2018-11-10 NOTE — PROGRESS NOTES
Pharmacist Intervention IV to PO Note    Juanita Ibarra is a 28 y.o. female being treated with IV medication pantoprazole    Patient Data:    Vital Signs (Most Recent):  Temp: 99.1 °F (37.3 °C) (11/10/18 1101)  Pulse: 72 (11/10/18 1130)  Resp: 18 (11/10/18 1130)  BP: (!) 166/96 (11/10/18 1115)  SpO2: 96 % (11/10/18 1130)   Vital Signs (72h Range):  Temp:  [97.6 °F (36.4 °C)-100.7 °F (38.2 °C)]   Pulse:  []   Resp:  [8-43]   BP: (113-185)/(64-99)   SpO2:  [87 %-100 %]      CBC:  Recent Labs   Lab 11/09/18  0345 11/09/18  1454 11/10/18  0256   WBC 4.72 6.26 5.43   RBC 2.37* 3.03* 3.08*   HGB 6.4* 8.5* 8.3*   HCT 21.6* 27.4* 28.5*   PLT 57* 65* 65*   MCV 91 90 93   MCH 27.0 28.1 26.9*   MCHC 29.6* 31.0* 29.1*     CMP:     Recent Labs   Lab 11/07/18  0312  11/09/18  1412 11/09/18  2100 11/10/18  0256   *   < > 134* 103 203*   CALCIUM 8.0*   < > 7.8* 7.6* 7.4*   ALBUMIN 3.2*   < > 2.8* 2.5* 2.5*   PROT 6.0  --   --   --   --       < > 141 143 144   K 6.5*   < > 4.0 3.3* 4.3   CO2 9*   < > 28 31* 29   *   < > 104 104 105   BUN 54*   < > 12 12 15   CREATININE 6.1*   < > 3.2* 3.5* 3.7*   ALKPHOS 166*  --   --   --   --    ALT 17  --   --   --   --    AST 18  --   --   --   --    BILITOT 0.3  --   --   --   --     < > = values in this interval not displayed.       Dietary Orders:  Diet Orders            Diet clear liquid: Clear Liquid starting at 11/10 1112            Based on the following criteria, this patient qualifies for intravenous to oral conversion:  [x] The patients gastrointestinal tract is functioning (tolerating medications via oral or enteral route and tolerating food or enteral feeds).      IV medication pantoprazole 40mg daily will be changed to oral medication pantoprazole 40mg daily    Pharmacist's Name: Gia Schaefer  Pharmacist's Extension: [phone]

## 2018-11-10 NOTE — ASSESSMENT & PLAN NOTE
FEV1 with persistent decline on outpatient PFTs. Currently on 2L NC. Her acute respiratory failure likely secondary to opioid overdose + acute renal failure. Received narcan x 2 en route to [facility] and was placed on narcan gtt with minimal improvement in mental status. Mental status has improved following round of CRRT and sodium bicarb gtt.     Mental status currently at baseline. Encouraged patient to begin to ambulate as tolerated. Currently requires 2L NC intermittently. Wean oxygen to maintain O2 sats >88%.

## 2018-11-10 NOTE — NURSING TRANSFER
Nursing Transfer Note      11/10/2018     Transfer To: TSU    Transfer via wheelchair    Transfer with cardiac monitoring    Transported by Nurse    Medicines sent: YES    Chart send with patient: Yes    Notified: spouse    Patient reassessed at: 11/10/18, 1515    Upon arrival to floor: cardiac monitor applied, patient oriented to room, call bell in reach and bed in lowest position. Report given to Keila. Nurse notified when arrived. Pt sitting in chair. Spouse at bedside.

## 2018-11-11 NOTE — ASSESSMENT & PLAN NOTE
Opioid overdose + acute renal failure from poor PO intake likely cause for acute respiratory failure and encephalopathy. Hold all sedating medications for now.     Will use IV Zofran and Phenergan at this time for relief from any opiate withdrawal symptoms. Tylenol PRN and oxycodone IR 10 mg PRN every 4 hours for pain relief. Will plan to transition to remaining home opioid regimen in the coming days while monitoring renal function and mental status.

## 2018-11-11 NOTE — PLAN OF CARE
Problem: Patient Care Overview  Goal: Plan of Care Review  Outcome: Ongoing (interventions implemented as appropriate)  AAOx3 with delayed responses. afebrile, c/o pain and nausea. Pain mildly controlled by prn pain medication. IV Zofran and phenergan given with mild relief. Pt believes phenergan is making her itchy. Pt tolerated about 15% of clear liquid diet for dinner. Pt continues to have sore throat and decrease hearing. Goldberg in place- clear yellow urine. See flow sheets for output. Bg monitored ACHS. Telemetry monitor in place (NSR).Pt able to position self independently in bed. x1 person assist OOB. Pt in lowest position, side rails up x2, non-skid foot wear in place, call light within reach, pt verbalized understanding to call RN when needed. Hand hygiene practiced per protocol. Will continue to monitor.

## 2018-11-11 NOTE — PROGRESS NOTES
[facility]  Nephrology  Progress Note    Patient Name: Juanita Ibarra  MRN: [id number]  Admission Date: 11/7/2018  Hospital Length of Stay: 4 days  Attending Provider: Jake Alvarez MD   Primary Care Physician: Jake Alvarez MD  Principal Problem:Acute metabolic encephalopathy    Subjective:     HPI: The patient is a  28 year-old  female with a medical history of BLT secondary to CF. 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 Patient presents as a transfer from [facility] in [place] on 11/7 after presenting there with a 3 day history of lethargy and dizziness per spouse. The patient has a history of multiple hospitalizations 2/2 PNA and/or dyspnea over the last couple of months. The patient was somnolent during assessment, therefore, majority of HPI is from her spouse and records. The spouse states that the patient had been feeling nauseated for the last 3 days. He states that she also stopped eating during that time. Despite her decreased appetite, the spouse continued to give her all of her prescribed medications medications including her Morphine as normal. On admit to the OSH, her intial BP was 83/52, , and SPO2 was 95% on ?room air. According to the records, she also had a ?LISBETH and was hyperkalemic. She was given calcium gluconate, insulin, D50, and Kayexalate at the OSH. The patient was also given a total of 4 L of ?IVFs for her BP and was started on Zosyn. She was then transferred to [facility] via flight care team overnight for an higher level of care. She was started on a bicarb gtt 2/2 severe metabolic acidosis and narcan drip 2/2 hypercapnic respiratory failure. ABG on admit was 6.9/59 and her bicarb was 12. ABG has improved to 7.2/48.6 and bicarb to 21.8 since being on bicarb drip. Patient on 2 L via  "NC.  The patient was hyperkalemic, 6.5, on admit, and was shifted. Potasium 5.1 after medications. Nephrology consulted for LISBETH and severe metabolic acidosis. The patient's spouse denies a history of kidney issues. Her baseline SCr from 2015 to 2018 has been around ~ 1.0. Her last appointment visits in October revealed a SCr of 1.5 and 1.8. The patient's  did state that a couple of months ago the patient was found to have a LISBETH during a outpatient appointment in [place]. He states that she was sent to ED and was given fluids and discharged home. He denied any issues around that time leading to her LISBETH and states it was "randomly" found on labs. The patient was admitted with a SCr of 6.1. Urine output has been between 10-30 ml/hr. She put out a total of 300 ml during night shift.     Nephrology consulted for management of LISBETH and metabolic acidosis.         Interval History:   Patient evaluated at bedside, no significant event overnight.     Review of patient's allergies indicates:   Allergen Reactions    Albuterol Palpitations    Colistin Anaphylaxis    Vancomycin analogues      Infusion reaction that does not resolve with slowing    Neupogen [filgrastim] Other (See Comments)     Ostealgia after five daily doses of 300 mcg.      Bactrim [sulfamethoxazole-trimethoprim] Hives    Ceftazidime Hives     Pt stated can tolerate cefapine not ceftazidime    Ceftazidime     Dronabinol Other (See Comments)     Mental changes/hallucinations    Haldol [haloperidol lactate] Other (See Comments)     Seizure like activity    Nsaids (non-steroidal anti-inflammatory drug)      Cannot have due to lung transplant    Adhesive Rash     Cloth tape- please use tegaderm or paper tape    Aztreonam Rash    Ciprofloxacin Nausea And Vomiting     Projectile N/V, per patient.  Unwilling to retry therapy.     Current Facility-Administered Medications   Medication Frequency    acetaminophen tablet 1,000 mg Q6H PRN    " acyclovir (ZOVIRAX) 290 mg in dextrose 5 % 100 mL IVPB Daily    ARIPiprazole tablet 2 mg Daily    ceFEPIme injection 1 g Daily    enoxaparin injection 30 mg Daily    fluticasone 50 mcg/actuation nasal spray 100 mcg Daily    fluticasone-vilanterol 100-25 mcg/dose diskus inhaler 1 puff Daily    levalbuterol nebulizer solution 1.25 mg Q8H PRN    magnesium sulfate 2g in water 50mL IVPB (premix) PRN    And    magnesium sulfate 2g in water 50mL IVPB (premix) PRN    methylPREDNISolone sodium succinate injection 10 mg Daily    metoprolol tartrate (LOPRESSOR) tablet 25 mg BID    mycophenolate (CELLCEPT) 500 mg in dextrose 5 % 100 mL IVPB BID    naloxone 0.4 mg/mL injection 0.4 mg PRN    nystatin 100,000 unit/mL suspension 500,000 Units TID    ondansetron injection 8 mg Q6H PRN    oxyCODONE immediate release tablet Tab 10 mg Q4H PRN    pantoprazole EC tablet 40 mg Daily    potassium chloride 10% oral solution 40 mEq PRN    And    potassium chloride 10% oral solution 40 mEq PRN    And    potassium chloride 10% oral solution 60 mEq PRN    promethazine (PHENERGAN) 25 mg in dextrose 5 % 50 mL IVPB Q6H PRN    simethicone chewable tablet 80 mg TID PRN    tiZANidine tablet 8 mg Q6H PRN       Objective:     Vital Signs (Most Recent):  Temp: 98.8 °F (37.1 °C) (11/11/18 1156)  Pulse: 61 (11/11/18 1156)  Resp: 18 (11/11/18 1156)  BP: (!) 169/93 (11/11/18 1156)  SpO2: 95 % (11/11/18 1156)  O2 Device (Oxygen Therapy): room air (11/11/18 1156) Vital Signs (24h Range):  Temp:  [98.4 °F (36.9 °C)-100 °F (37.8 °C)] 98.8 °F (37.1 °C)  Pulse:  [61-88] 61  Resp:  [15-25] 18  SpO2:  [95 %-96 %] 95 %  BP: (148-169)/(82-93) 169/93     Weight: 61.7 kg (136 lb 0.4 oz) (11/11/18 0351)  Body mass index is 25.7 kg/m².  Body surface area is 1.63 meters squared.    I/O last 3 completed shifts:  In: 630 [P.O.:180; IV Piggyback:450]  Out: 1025 [Urine:1025]    Physical Exam   Constitutional: She appears well-developed. She is  uncooperative. She does not have a sickly appearance. No distress. Nasal cannula in place.   HENT:   Head: Normocephalic and atraumatic.   Mouth/Throat: No oropharyngeal exudate.   Eyes: Right eye exhibits no discharge. Left eye exhibits no discharge.   Neck: Normal range of motion. Neck supple. No JVD present.   Cardiovascular: Normal rate, regular rhythm, normal heart sounds and intact distal pulses.   No murmur heard.  Pulmonary/Chest: Effort normal and breath sounds normal. She has no wheezes. She has no rhonchi. She has no rales.   Abdominal: Soft. Bowel sounds are normal. She exhibits no distension. There is no tenderness. There is no guarding.   Musculoskeletal: Normal range of motion. She exhibits no edema.   Neurological: She is alert. No cranial nerve deficit.   Skin: Skin is warm and dry. No rash noted. She is not diaphoretic. No erythema. There is pallor.   Psychiatric: Her speech is normal. Her mood appears not anxious. She does not exhibit a depressed mood.   Nursing note and vitals reviewed.      Significant Labs:  ABGs:   Recent Labs   Lab 11/09/18  0815   PH 7.451*   PCO2 50.1*   HCO3 34.9*   POCSATURATED 65*   BE 11     BMP:   Recent Labs   Lab 11/11/18  0500         CO2 28   BUN 22*   CREATININE 4.2*   CALCIUM 7.6*   MG 2.1     CBC:   Recent Labs   Lab 11/11/18  0500   WBC 5.58   RBC 2.90*   HGB 7.9*   HCT 26.0*   PLT 71*   MCV 90   MCH 27.2   MCHC 30.4*     CMP:   Recent Labs   Lab 11/07/18  0312  11/11/18  0500   *   < > 108   CALCIUM 8.0*   < > 7.6*   ALBUMIN 3.2*   < > 2.5*   PROT 6.0  --   --       < > 144   K 6.5*   < > 3.2*   CO2 9*   < > 28   *   < > 106   BUN 54*   < > 22*   CREATININE 6.1*   < > 4.2*   ALKPHOS 166*  --   --    ALT 17  --   --    AST 18  --   --    BILITOT 0.3  --   --     < > = values in this interval not displayed.     All labs within the past 24 hours have been reviewed.       Assessment/Plan:     Acute kidney failure    The patient is a   28 year-old  female with medical history of BLT secondary to CF. 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 Patient presents as a transfer from [facility] in [place] on 11/7 after presenting there with a 3 day history of lethargy and dizziness per spouse. She was transferred to [facility] via flight care team overnight. She was started on a bicarb gtt 2/2 severe metabolic acidosis and narcan drip 2/2 hypercapnic respiratory failure. ABG on admit was 6.9/59 and her bicarb was 12. ABG improved to 7.2/48.6 and bicarb to 21.8 since being on bicarb drip. Patient on 2 L via NC. The patient was hyperkalemic, 6.5, on admit, and was shifted. Potasium 5.1 after medications. Nephrology consulted for LISBETH and severe metabolic acidosis. The patient's spouse denies a history of kidney issues. Her baseline SCr from 2015 to 2018 has been around ~ 1.0. Her appointment visits in October revealed a SCr of 1.5 and 1.8. The patient was admitted with a SCr of 6.1. Urine outputt has been between 10-30 ml/hr. She put out a total of 300 ml during night shift. UA today with 2+ protein. UPCR pending. LISBETH secondary to ATN likely caused by hypotension/sepsis in the setting of severe metabolic acidosis.     Plan:   Will continue to hold on dialysis for today   Urinary catheter removed yesterday, strict I/Os. Low UOP, 730 ml/24h  Renal US completed on 11/7/18, showing reduced perfusion bilaterally   Urine creatinine/protein ratio was 0.51  Avoid NSAIDs, contrast, nephrotoxins   Monitor strict I/Os, daily weights  Renally dose medications to current GFR       Carlos Pacheco  Nephrology  Fellow  [facility]    Pager [phone]    ATTENDING PHYSICIAN ATTESTATION  I have personally interviewed and examined the patient. I thoroughly reviewed the  demographic, clinical, laboratorial and imaging information available in medical records. I agree with the assessment and recommendations provided by the subspecialty resident. Dr. Lin was under my supervision.

## 2018-11-11 NOTE — ASSESSMENT & PLAN NOTE
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 Patient presents as a transfer from [facility] in [place] on 11/7 after presenting there with a 3 day history of lethargy and dizziness per spouse. She was transferred to [facility] via flight care team overnight. She was started on a bicarb gtt 2/2 severe metabolic acidosis and narcan drip 2/2 hypercapnic respiratory failure. ABG on admit was 6.9/59 and her bicarb was 12. ABG improved to 7.2/48.6 and bicarb to 21.8 since being on bicarb drip. Patient on 2 L via NC. The patient was hyperkalemic, 6.5, on admit, and was shifted. Potasium 5.1 after medications. Nephrology consulted for LISBETH and severe metabolic acidosis. The patient's spouse denies a history of kidney issues. Her baseline SCr from 2015 to 2018 has been around ~ 1.0. Her appointment visits in October revealed a SCr of 1.5 and 1.8. The patient was admitted with a SCr of 6.1. Urine outputt has been between 10-30 ml/hr. She put out a total of 300 ml during night shift. UA today with 2+ protein. UPCR pending. LISBETH secondary to ATN likely caused by hypotension/sepsis in the setting of severe metabolic acidosis.     Plan:   Will continue to hold on dialysis for today   Urinary catheter removed yesterday, strict I/Os. Low UOP, 730 ml/24h  Renal US completed on 11/7/18, showing reduced perfusion bilaterally   Urine creatinine/protein ratio was 0.51  Avoid NSAIDs, contrast, nephrotoxins   Monitor strict I/Os, daily weights  Renally dose medications to current GFR

## 2018-11-11 NOTE — PROGRESS NOTES
[facility]  Lung Transplant  Progress Note - Floor    Patient Name: Juanita Ibarra  MRN: [id number]  Admission Date: 11/7/2018  Hospital Length of Stay: 4 days  Post-Operative Day: 1613  Attending Physician: Jake Alvarez MD  Primary Care Provider: Jake Alvarez MD     Subjective:     Interval History: No acute events overnight. Oral KCl tablet administered x1 for K 3.2 today. Nephrology following, appreciate recs. UOP increased to 730cc/24 hr yesterday. Goldberg removed this morning. Patient continues to report lower, left sided abdominal pain and nausea. Will plan for UA this afternoon. Advised patient to closely monitor and measure UOP throughout the day. Mental status remains at baseline. Continues to have dry cough with chest congestion. No other complaints at this time.       Continuous Infusions:    Scheduled Meds:   acyclovir (ZOVIRAX) IVPB (wt 35 - 70 kg)  5 mg/kg Intravenous Daily    ceFEPime (MAXIPIME) IVPB  1 g Intravenous Daily    enoxaparin  30 mg Subcutaneous Daily    fluticasone  2 spray Each Nare Daily    fluticasone-vilanterol  1 puff Inhalation Daily    methylPREDNISolone sodium succinate  10 mg Intravenous Daily    metoprolol tartrate  25 mg Oral BID    mycophenolate (CELLCEPT) IVPB  500 mg Intravenous BID    nystatin  500,000 Units Oral TID    pantoprazole  40 mg Oral Daily     PRN Meds:acetaminophen, levalbuterol, magnesium sulfate IVPB **AND** magnesium sulfate IVPB, naloxone, ondansetron, oxyCODONE, potassium chloride 10% **AND** potassium chloride 10% **AND** potassium chloride 10%, promethazine (PHENERGAN) IVPB, simethicone, tiZANidine    Review of patient's allergies indicates:   Allergen Reactions    Albuterol Palpitations    Colistin Anaphylaxis    Vancomycin analogues      Infusion reaction that does not resolve with slowing    Neupogen [filgrastim] Other (See Comments)     Ostealgia after five daily doses of 300 mcg.      Bactrim  [sulfamethoxazole-trimethoprim] Hives    Ceftazidime Hives     Pt stated can tolerate cefapine not ceftazidime    Ceftazidime     Dronabinol Other (See Comments)     Mental changes/hallucinations    Haldol [haloperidol lactate] Other (See Comments)     Seizure like activity    Nsaids (non-steroidal anti-inflammatory drug)      Cannot have due to lung transplant    Adhesive Rash     Cloth tape- please use tegaderm or paper tape    Aztreonam Rash    Ciprofloxacin Nausea And Vomiting     Projectile N/V, per patient.  Unwilling to retry therapy.       Review of Systems   Constitutional: Positive for activity change and appetite change. Negative for chills, diaphoresis, fatigue and fever.   HENT: Negative for congestion, rhinorrhea, sore throat and trouble swallowing.    Eyes: Negative for discharge and redness.   Respiratory: Positive for cough. Negative for choking, shortness of breath, wheezing and stridor.    Cardiovascular: Negative for chest pain, palpitations and leg swelling.   Gastrointestinal: Positive for nausea. Negative for abdominal pain, constipation, diarrhea and vomiting.   Endocrine: Negative for polydipsia and polyuria.   Genitourinary: Positive for decreased urine volume. Negative for dysuria, flank pain and urgency.   Allergic/Immunologic: Positive for immunocompromised state.   Neurological: Positive for weakness and headaches. Negative for dizziness, seizures, speech difficulty and numbness.   Hematological: Bruises/bleeds easily.   Psychiatric/Behavioral: Positive for decreased concentration. Negative for behavioral problems, confusion and hallucinations.     Objective:   Physical Exam   Constitutional: She is oriented to person, place, and time. She appears well-developed and well-nourished.  Non-toxic appearance. She has a sickly appearance. She does not appear ill. No distress.   HENT:   Head: Atraumatic.   Right Ear: External ear normal.   Left Ear: External ear normal.   Nose: Nose  normal.   Mouth/Throat: Oropharynx is clear and moist.   Round facies   Eyes: Conjunctivae and EOM are normal. No scleral icterus.   Neck: Normal range of motion. Neck supple. No JVD present. No tracheal deviation present.   Cardiovascular: Regular rhythm, normal heart sounds and intact distal pulses. Tachycardia present. Exam reveals no gallop and no friction rub.   No murmur heard.  Pulmonary/Chest: Effort normal. No stridor. She has no decreased breath sounds. She has no wheezes. She has no rhonchi. She has no rales.   Abdominal: Soft. Bowel sounds are normal. She exhibits no distension. There is tenderness (minimal, diffuse).   Musculoskeletal: Normal range of motion. She exhibits no edema, tenderness or deformity.   Neurological: She is alert and oriented to person, place, and time. No cranial nerve deficit.   Moves all extremities     Skin: Skin is warm and dry. No rash noted. She is not diaphoretic. No erythema. No pallor.   Psychiatric: She has a normal mood and affect. Her behavior is normal. Judgment and thought content normal.   Nursing note and vitals reviewed.        Vital Signs (Most Recent):  Temp: 98.8 °F (37.1 °C) (11/11/18 1156)  Pulse: 61 (11/11/18 1156)  Resp: 18 (11/11/18 1156)  BP: (!) 169/93 (11/11/18 1156)  SpO2: 95 % (11/11/18 1156) Vital Signs (24h Range):  Temp:  [98.4 °F (36.9 °C)-100 °F (37.8 °C)] 98.8 °F (37.1 °C)  Pulse:  [61-88] 61  Resp:  [18-23] 18  SpO2:  [95 %-96 %] 95 %  BP: (148-169)/(82-93) 169/93     Weight: 61.7 kg (136 lb 0.4 oz)  Body mass index is 25.7 kg/m².      Intake/Output Summary (Last 24 hours) at 11/11/2018 1418  Last data filed at 11/11/2018 1026  Gross per 24 hour   Intake 430 ml   Output 950 ml   Net -520 ml       Ventilator Data:          Hemodynamic Parameters:       Lines/Drains:       Port A Cath Single Lumen right subclavian (Active)   Dressing Type Transparent 11/8/2018 11:15 AM   Dressing Status Clean;Dry;Intact 11/8/2018 11:15 AM   Dressing Intervention  Dressing reinforced 11/8/2018  3:05 AM   Dressing Change Due 10/06/18 10/4/2018  8:00 PM   Line Status Infusing 11/8/2018 11:15 AM   Flush Performed Yes 11/8/2018 11:15 AM   Date to be Reflushed 10/04/18 10/3/2018  8:00 PM   Daily Line Review Performed 11/8/2018 11:15 AM   Type of Needle Nicole 11/8/2018 11:15 AM   Gauge 20 11/8/2018 11:15 AM   Needle Length 3/4 in 10/1/2018  8:00 AM   Needle Status Left in place 11/8/2018 11:15 AM   Needle Insertion Date 09/29/18 10/1/2018  8:00 AM   Needle Insertion Time 1800 10/1/2018  8:00 AM   Needle Removal Date 09/29/18 9/29/2018 11:00 AM   Needle Removal Time 1136 9/29/2018 11:00 AM   Number of days:             Trialysis (Dialysis) Catheter 11/07/18 1023 right femoral (Active)   IV Device Securement sutures 11/8/2018 11:15 AM   Additional Site Signs no drainage;no streak formation;no palpable cord;no pain;no edema;no warmth;no erythema 11/8/2018 11:15 AM   Patency/Care flushed w/o difficulty 11/8/2018 11:15 AM   Site Assessment Clean;Dry;Intact;No redness;No swelling 11/8/2018 11:15 AM   Status Accessed 11/8/2018 11:15 AM   Flows Good 11/8/2018 11:15 AM   Dressing Intervention Dressing reinforced 11/8/2018  3:05 AM   Dressing Status Clean;Dry;Intact;Biopatch in place 11/8/2018 11:15 AM   Dressing Change Due 11/14/18 11/8/2018 11:15 AM   Verification by X-ray Yes 11/7/2018 10:30 AM   Site Condition No complications 11/8/2018 11:15 AM   Dressing Occlusive 11/8/2018 11:15 AM   Daily Line Review Performed 11/8/2018 11:15 AM   Number of days: 1            Peripheral IV - Single Lumen 11/07/18 0200 Right Hand (Active)   Site Assessment Clean;Dry;Intact;No redness;No swelling 11/8/2018 11:15 AM   Line Status Saline locked 11/8/2018 11:15 AM   Dressing Status Intact;Dry;Clean 11/8/2018 11:15 AM   Dressing Intervention New dressing 11/8/2018  3:05 AM   Dressing Change Due 11/11/18 11/8/2018  3:05 AM   Site Change Due 11/11/18 11/8/2018 11:15 AM   Reason Not Rotated Not due 11/8/2018  "11:15 AM   Number of days: 1            Urethral Catheter 11/07/18 0300 (Active)   Site Assessment Clean;Intact 11/8/2018 11:15 AM   Collection Container Urimeter 11/8/2018 11:15 AM   Securement Method secured to top of thigh w/ adhesive device 11/8/2018 11:15 AM   Catheter Care Performed yes 11/8/2018 11:15 AM   Reason for Continuing Urinary Catheterization Critically ill in ICU requiring intensive monitoring 11/8/2018 11:15 AM   CAUTI Prevention Bundle StatLock in place w 1" slack;Intact seal between catheter & drainage tubing;Drainage bag off the floor;Green sheeting clip in use;No dependent loops or kinks;Drainage bag not overfilled (<2/3 full);Drainage bag below bladder 11/8/2018 11:15 AM   Output (mL) 5 mL 11/8/2018 12:00 PM   Number of days: 1       Significant Labs:  CBC:  Recent Labs   Lab 11/11/18  0500   WBC 5.58   RBC 2.90*   HGB 7.9*   HCT 26.0*   PLT 71*   MCV 90   MCH 27.2   MCHC 30.4*     BMP:  Recent Labs   Lab 11/11/18  0500      K 3.2*      CO2 28   BUN 22*   CREATININE 4.2*   CALCIUM 7.6*      Tacrolimus Levels:  Recent Labs   Lab 11/11/18  0500   TACROLIMUS <1.5*     Microbiology:  Microbiology Results (last 7 days)     Procedure Component Value Units Date/Time    Blood culture [[id number]] Collected:  11/07/18 0252    Order Status:  Completed Specimen:  Blood from Peripheral, Wrist, Right Updated:  11/11/18 0612     Blood Culture, Routine No Growth to date     Blood Culture, Routine No Growth to date     Blood Culture, Routine No Growth to date     Blood Culture, Routine No Growth to date     Blood Culture, Routine No Growth to date    Blood culture [[id number]] Collected:  11/07/18 0200    Order Status:  Completed Specimen:  Blood from Peripheral, Antecubital, Right Updated:  11/11/18 0612     Blood Culture, Routine No Growth to date     Blood Culture, Routine No Growth to date     Blood Culture, Routine No Growth to date     Blood Culture, Routine No Growth to date     Blood " Culture, Routine No Growth to date    CSF culture [[id number]] Collected:  11/09/18 1657    Order Status:  Completed Specimen:  CSF (Spinal Fluid) from CSF Tap, Tube 3 Updated:  11/11/18 0551     CSF CULTURE No Growth to date     Gram Stain Result Rare WBC's      No organisms seen    Cryptococcal antigen, CSF [[id number]] Collected:  11/09/18 1657    Order Status:  Completed Specimen:  CSF (Spinal Fluid) from CSF Tap, Tube 3 Updated:  11/10/18 1405     Crypto Ag, CSF Negative    Fungus culture [[id number]] Collected:  11/09/18 1657    Order Status:  Sent Specimen:  CSF (Spinal Fluid) from CSF Tap, Tube 3 Updated:  11/09/18 1809    Gram stain [[id number]] Collected:  11/09/18 1657    Order Status:  Canceled Specimen:  CSF (Spinal Fluid) from CSF Tap, Tube 3 Updated:  11/09/18 1809    Cryptococcal antigen [[id number]] Collected:  11/08/18 1641    Order Status:  Completed Specimen:  Blood Updated:  11/09/18 1438     Cryptococcal Ag, Blood Negative    Fungus Culture, Blood or Bone Marrow [[id number]] Collected:  11/07/18 1715    Order Status:  Completed Specimen:  Blood Updated:  11/08/18 1323     Fungus Cult, blood or BM Culture in progress    Respiratory Viral Panel by PCR [facility]; Nasal Swab [[id number]] Collected:  11/07/18 0346    Order Status:  Completed Specimen:  Respiratory Updated:  11/08/18 1212     Respiratory Virus Panel, source Nasal Swab     RVP - Adenovirus Not Detected     Comment: Detects Serotypes B and E. Detection of Serotype C may   be limited. If Adenovirus infection is suspected and a   Not Detected result is returned the sample should be   re-tested for Adenovirus using an independent method  (e.g. [employer] Adenovirus Quantitative Real-Time  PCR test.          Enterovirus Not Detected     Comment: Cross-reactivity has been observed between certain Rhinovirus  strains and the Enterovirus assay.          Human Bocavirus Not Detected     Human Coronavirus Not Detected     Comment: The Human  Coronavirus assay detects Human coronavirus types  229E, OC43,NL63 and HKU1.          RVP - Human Metapneumovirus (hMPV) Not Detected     RVP - Influenza A Not Detected     Influenza A - V5C3-31 Not Detected     RVP - Influenza B Not Detected     Parainfluenza Not Detected     Respiratory Syncytial VirusVirus (RSV) A Not Detected     Comment: The Respiratory Syncytial Viral assay detects types A and B,  however it does not distinguish between the two.          RVP - Rhinovirus Not Detected     Comment: Cross-Reactivity has been observed between certain   Rhinovirus strains and the Enterovirus assay.  Target Enriched Mulitplex Polymerase Chain Reaction (TEM-PCR)  allows for the detection of multiple pathogens out of a single  reaction.  This test was developed and its performance   characteristics determined by [employer].  It has not   been cleared or approved by the U.S.Food and Drug Administration.  Results should be used in conjunction with clinical findings,   and should not form the sole basis for a diagnosis or treatment  decision.  TEM-PCR is a licensed technology of [employer].         Narrative:       Receiving Lab:->[facility]    Culture, Respiratory with Gram Stain [[id number]]     Order Status:  Canceled Specimen:  Respiratory from Sputum, Expectorated           I have reviewed all pertinent labs within the past 24 hours.    Diagnostic Results:  Chest X-Ray: I personally reviewed the films and findings are: no acute process      Assessment/Plan:     * Acute metabolic encephalopathy    Likely secondary to acute renal failure and hypoventilation related to opioid overdose. Received SLED 11/7 with slight improvement in mental status.     Mental status has returned to baseline this morning. Will continue to closely monitor neuro status.   Continue acyclovir for concern for viral meningitis. Previous HSV studies in 2013 negative. Varicella IgG positive in 2013. CT Head 11/8 negative for  acute process. LP on 11/9 - cultures pending.        Sepsis    Patient presented with AMS with LISBETH, severe anion gap metabolic acidosis, hypotension and hyperkalemia. Patient's  reporting headaches and poor PO intake prior to admission. Placed on empiric Vanc/Zosyn upon admission. Urine and blood cultures negative. Concerns for sepsis from unknown source. Transitioned to Acyclovir/Cefepime on 11/8 due to concerns for viral meningitis and for increased CNS penetration. Patient has remained afebrile over the last 24 hours.     Empiric acyclovir started for viral meningitis. CT Head 11/8 with no acute process or ICH noted.  LP 11/9 cultures pending but NGTD. LP cell count inconclusive to due traumatic tap.  Continue supportive care. Will plan to re-introduce home regimen of pain medications with oxycodone IR increased to 10 mg Q4hrs PRN pain. Continuing to avoid benzodiazepine medications to avoid further delirium. ID following, appreciate recs.      Acute kidney failure    Likely multifactorial 2/2 poor PO intake from acute illness coupled with nephrotoxic medications. Initially received 4L LR with bicarb gtt with minimal UOP. Received SLED 11/7 and discontinued 11/8 due to resolution of acidosis and hyperkalemia.     Cr increased to 4.2 today. Will plan to continue to hold tacrolimus. Continue to closely monitor UOP. No need for urgent CRRT or SLED today. Nephrology following, appreciate recs.      Lung transplant status, bilateral    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     Will hold tacrolimus in setting of acute renal failure. Will plan to continue immunosuppression with MMF and prednisone.      Bronchiolitis obliterans syndrome    FEV1 with persistent decline on outpatient PFTs. Currently on 2L NC. Her acute respiratory failure likely secondary to opioid  overdose + acute renal failure. Received narcan x 2 en route to [facility] and was placed on narcan gtt with minimal improvement in mental status. Mental status has improved following round of CRRT and sodium bicarb gtt.     Mental status currently at baseline. Encouraged patient to begin to ambulate as tolerated. Currently requires 2L NC intermittently. Wean oxygen to maintain O2 sats >88%.      Immunosuppression    Current outpatient regimen is tacrolimus 3.5 mg BID, prednisone 5 mg daily, and  mg BID.    Will continue to hold tacrolimus in setting of acute renal failure. Repeat tacrolimus level tomorrow for monitoring of level. Will plan to resume tacrolimus in the coming days.  Will plan to transition to oral regimen of prednisone 5 mg daily and  mg BID.      Prophylactic antibiotic    On dapsone 100 mg daily outpatient. Dapsone resumed.      Chronic pain with opiate use    Opioid overdose + acute renal failure from poor PO intake likely cause for acute respiratory failure and encephalopathy. Hold all sedating medications for now.     Will use IV Zofran and Phenergan at this time for relief from any opiate withdrawal symptoms. Tylenol PRN and oxycodone IR 10 mg PRN every 4 hours for pain relief. Will plan to transition to remaining home opioid regimen in the coming days while monitoring renal function and mental status.      CF related Pancreatic insufficiency    Previously on Creon with meals and snacks.     Bedside nursing swallow evaluation overnight with no concern for aspiration. Will begin with clear liquid diet and advance to renal diet. Will add on Creon once appropriate.          Jacqueline Dumont PA-C  Lung Transplant  [facility]

## 2018-11-11 NOTE — SUBJECTIVE & OBJECTIVE
Subjective:     Interval History: No acute events overnight. Oral KCl tablet administered x1 for K 3.2 today. Nephrology following, appreciate recs. UOP increased to 730cc/24 hr yesterday. Goldberg removed this morning. Patient continues to report lower, left sided abdominal pain and nausea. Will plan for UA this afternoon. Advised patient to closely monitor and measure UOP throughout the day. Mental status remains at baseline. Continues to have dry cough with chest congestion. No other complaints at this time.       Continuous Infusions:    Scheduled Meds:   acyclovir (ZOVIRAX) IVPB (wt 35 - 70 kg)  5 mg/kg Intravenous Daily    ceFEPime (MAXIPIME) IVPB  1 g Intravenous Daily    enoxaparin  30 mg Subcutaneous Daily    fluticasone  2 spray Each Nare Daily    fluticasone-vilanterol  1 puff Inhalation Daily    methylPREDNISolone sodium succinate  10 mg Intravenous Daily    metoprolol tartrate  25 mg Oral BID    mycophenolate (CELLCEPT) IVPB  500 mg Intravenous BID    nystatin  500,000 Units Oral TID    pantoprazole  40 mg Oral Daily     PRN Meds:acetaminophen, levalbuterol, magnesium sulfate IVPB **AND** magnesium sulfate IVPB, naloxone, ondansetron, oxyCODONE, potassium chloride 10% **AND** potassium chloride 10% **AND** potassium chloride 10%, promethazine (PHENERGAN) IVPB, simethicone, tiZANidine    Review of patient's allergies indicates:   Allergen Reactions    Albuterol Palpitations    Colistin Anaphylaxis    Vancomycin analogues      Infusion reaction that does not resolve with slowing    Neupogen [filgrastim] Other (See Comments)     Ostealgia after five daily doses of 300 mcg.      Bactrim [sulfamethoxazole-trimethoprim] Hives    Ceftazidime Hives     Pt stated can tolerate cefapine not ceftazidime    Ceftazidime     Dronabinol Other (See Comments)     Mental changes/hallucinations    Haldol [haloperidol lactate] Other (See Comments)     Seizure like activity    Nsaids (non-steroidal  anti-inflammatory drug)      Cannot have due to lung transplant    Adhesive Rash     Cloth tape- please use tegaderm or paper tape    Aztreonam Rash    Ciprofloxacin Nausea And Vomiting     Projectile N/V, per patient.  Unwilling to retry therapy.       Review of Systems   Constitutional: Positive for activity change and appetite change. Negative for chills, diaphoresis, fatigue and fever.   HENT: Negative for congestion, rhinorrhea, sore throat and trouble swallowing.    Eyes: Negative for discharge and redness.   Respiratory: Positive for cough. Negative for choking, shortness of breath, wheezing and stridor.    Cardiovascular: Negative for chest pain, palpitations and leg swelling.   Gastrointestinal: Positive for nausea. Negative for abdominal pain, constipation, diarrhea and vomiting.   Endocrine: Negative for polydipsia and polyuria.   Genitourinary: Positive for decreased urine volume. Negative for dysuria, flank pain and urgency.   Allergic/Immunologic: Positive for immunocompromised state.   Neurological: Positive for weakness and headaches. Negative for dizziness, seizures, speech difficulty and numbness.   Hematological: Bruises/bleeds easily.   Psychiatric/Behavioral: Positive for decreased concentration. Negative for behavioral problems, confusion and hallucinations.     Objective:   Physical Exam   Constitutional: She is oriented to person, place, and time. She appears well-developed and well-nourished.  Non-toxic appearance. She has a sickly appearance. She does not appear ill. No distress.   HENT:   Head: Atraumatic.   Right Ear: External ear normal.   Left Ear: External ear normal.   Nose: Nose normal.   Mouth/Throat: Oropharynx is clear and moist.   Round facies   Eyes: Conjunctivae and EOM are normal. No scleral icterus.   Neck: Normal range of motion. Neck supple. No JVD present. No tracheal deviation present.   Cardiovascular: Regular rhythm, normal heart sounds and intact distal pulses.  Tachycardia present. Exam reveals no gallop and no friction rub.   No murmur heard.  Pulmonary/Chest: Effort normal. No stridor. She has no decreased breath sounds. She has no wheezes. She has no rhonchi. She has no rales.   Abdominal: Soft. Bowel sounds are normal. She exhibits no distension. There is tenderness (minimal, diffuse).   Musculoskeletal: Normal range of motion. She exhibits no edema, tenderness or deformity.   Neurological: She is alert and oriented to person, place, and time. No cranial nerve deficit.   Moves all extremities     Skin: Skin is warm and dry. No rash noted. She is not diaphoretic. No erythema. No pallor.   Psychiatric: She has a normal mood and affect. Her behavior is normal. Judgment and thought content normal.   Nursing note and vitals reviewed.        Vital Signs (Most Recent):  Temp: 98.8 °F (37.1 °C) (11/11/18 1156)  Pulse: 61 (11/11/18 1156)  Resp: 18 (11/11/18 1156)  BP: (!) 169/93 (11/11/18 1156)  SpO2: 95 % (11/11/18 1156) Vital Signs (24h Range):  Temp:  [98.4 °F (36.9 °C)-100 °F (37.8 °C)] 98.8 °F (37.1 °C)  Pulse:  [61-88] 61  Resp:  [18-23] 18  SpO2:  [95 %-96 %] 95 %  BP: (148-169)/(82-93) 169/93     Weight: 61.7 kg (136 lb 0.4 oz)  Body mass index is 25.7 kg/m².      Intake/Output Summary (Last 24 hours) at 11/11/2018 1418  Last data filed at 11/11/2018 1026  Gross per 24 hour   Intake 430 ml   Output 950 ml   Net -520 ml       Ventilator Data:          Hemodynamic Parameters:       Lines/Drains:       Port A Cath Single Lumen right subclavian (Active)   Dressing Type Transparent 11/8/2018 11:15 AM   Dressing Status Clean;Dry;Intact 11/8/2018 11:15 AM   Dressing Intervention Dressing reinforced 11/8/2018  3:05 AM   Dressing Change Due 10/06/18 10/4/2018  8:00 PM   Line Status Infusing 11/8/2018 11:15 AM   Flush Performed Yes 11/8/2018 11:15 AM   Date to be Reflushed 10/04/18 10/3/2018  8:00 PM   Daily Line Review Performed 11/8/2018 11:15 AM   Type of Needle Nicole  11/8/2018 11:15 AM   Gauge 20 11/8/2018 11:15 AM   Needle Length 3/4 in 10/1/2018  8:00 AM   Needle Status Left in place 11/8/2018 11:15 AM   Needle Insertion Date 09/29/18 10/1/2018  8:00 AM   Needle Insertion Time 1800 10/1/2018  8:00 AM   Needle Removal Date 09/29/18 9/29/2018 11:00 AM   Needle Removal Time 1136 9/29/2018 11:00 AM   Number of days:             Trialysis (Dialysis) Catheter 11/07/18 1023 right femoral (Active)   IV Device Securement sutures 11/8/2018 11:15 AM   Additional Site Signs no drainage;no streak formation;no palpable cord;no pain;no edema;no warmth;no erythema 11/8/2018 11:15 AM   Patency/Care flushed w/o difficulty 11/8/2018 11:15 AM   Site Assessment Clean;Dry;Intact;No redness;No swelling 11/8/2018 11:15 AM   Status Accessed 11/8/2018 11:15 AM   Flows Good 11/8/2018 11:15 AM   Dressing Intervention Dressing reinforced 11/8/2018  3:05 AM   Dressing Status Clean;Dry;Intact;Biopatch in place 11/8/2018 11:15 AM   Dressing Change Due 11/14/18 11/8/2018 11:15 AM   Verification by X-ray Yes 11/7/2018 10:30 AM   Site Condition No complications 11/8/2018 11:15 AM   Dressing Occlusive 11/8/2018 11:15 AM   Daily Line Review Performed 11/8/2018 11:15 AM   Number of days: 1            Peripheral IV - Single Lumen 11/07/18 0200 Right Hand (Active)   Site Assessment Clean;Dry;Intact;No redness;No swelling 11/8/2018 11:15 AM   Line Status Saline locked 11/8/2018 11:15 AM   Dressing Status Intact;Dry;Clean 11/8/2018 11:15 AM   Dressing Intervention New dressing 11/8/2018  3:05 AM   Dressing Change Due 11/11/18 11/8/2018  3:05 AM   Site Change Due 11/11/18 11/8/2018 11:15 AM   Reason Not Rotated Not due 11/8/2018 11:15 AM   Number of days: 1            Urethral Catheter 11/07/18 0300 (Active)   Site Assessment Clean;Intact 11/8/2018 11:15 AM   Collection Container Urimeter 11/8/2018 11:15 AM   Securement Method secured to top of thigh w/ adhesive device 11/8/2018 11:15 AM   Catheter Care Performed yes  "11/8/2018 11:15 AM   Reason for Continuing Urinary Catheterization Critically ill in ICU requiring intensive monitoring 11/8/2018 11:15 AM   CAUTI Prevention Bundle StatLock in place w 1" slack;Intact seal between catheter & drainage tubing;Drainage bag off the floor;Green sheeting clip in use;No dependent loops or kinks;Drainage bag not overfilled (<2/3 full);Drainage bag below bladder 11/8/2018 11:15 AM   Output (mL) 5 mL 11/8/2018 12:00 PM   Number of days: 1       Significant Labs:  CBC:  Recent Labs   Lab 11/11/18  0500   WBC 5.58   RBC 2.90*   HGB 7.9*   HCT 26.0*   PLT 71*   MCV 90   MCH 27.2   MCHC 30.4*     BMP:  Recent Labs   Lab 11/11/18  0500      K 3.2*      CO2 28   BUN 22*   CREATININE 4.2*   CALCIUM 7.6*      Tacrolimus Levels:  Recent Labs   Lab 11/11/18  0500   TACROLIMUS <1.5*     Microbiology:  Microbiology Results (last 7 days)     Procedure Component Value Units Date/Time    Blood culture [[id number]] Collected:  11/07/18 0252    Order Status:  Completed Specimen:  Blood from Peripheral, Wrist, Right Updated:  11/11/18 0612     Blood Culture, Routine No Growth to date     Blood Culture, Routine No Growth to date     Blood Culture, Routine No Growth to date     Blood Culture, Routine No Growth to date     Blood Culture, Routine No Growth to date    Blood culture [[id number]] Collected:  11/07/18 0200    Order Status:  Completed Specimen:  Blood from Peripheral, Antecubital, Right Updated:  11/11/18 0612     Blood Culture, Routine No Growth to date     Blood Culture, Routine No Growth to date     Blood Culture, Routine No Growth to date     Blood Culture, Routine No Growth to date     Blood Culture, Routine No Growth to date    CSF culture [[id number]] Collected:  11/09/18 1657    Order Status:  Completed Specimen:  CSF (Spinal Fluid) from CSF Tap, Tube 3 Updated:  11/11/18 0551     CSF CULTURE No Growth to date     Gram Stain Result Rare WBC's      No organisms seen    Cryptococcal " antigen, CSF [[id number]] Collected:  11/09/18 1657    Order Status:  Completed Specimen:  CSF (Spinal Fluid) from CSF Tap, Tube 3 Updated:  11/10/18 1405     Crypto Ag, CSF Negative    Fungus culture [[id number]] Collected:  11/09/18 1657    Order Status:  Sent Specimen:  CSF (Spinal Fluid) from CSF Tap, Tube 3 Updated:  11/09/18 1809    Gram stain [[id number]] Collected:  11/09/18 1657    Order Status:  Canceled Specimen:  CSF (Spinal Fluid) from CSF Tap, Tube 3 Updated:  11/09/18 1809    Cryptococcal antigen [[id number]] Collected:  11/08/18 1641    Order Status:  Completed Specimen:  Blood Updated:  11/09/18 1438     Cryptococcal Ag, Blood Negative    Fungus Culture, Blood or Bone Marrow [[id number]] Collected:  11/07/18 1715    Order Status:  Completed Specimen:  Blood Updated:  11/08/18 1323     Fungus Cult, blood or BM Culture in progress    Respiratory Viral Panel by PCR [facility]; Nasal Swab [[id number]] Collected:  11/07/18 0346    Order Status:  Completed Specimen:  Respiratory Updated:  11/08/18 1212     Respiratory Virus Panel, source Nasal Swab     RVP - Adenovirus Not Detected     Comment: Detects Serotypes B and E. Detection of Serotype C may   be limited. If Adenovirus infection is suspected and a   Not Detected result is returned the sample should be   re-tested for Adenovirus using an independent method  (e.g. [employer] Adenovirus Quantitative Real-Time  PCR test.          Enterovirus Not Detected     Comment: Cross-reactivity has been observed between certain Rhinovirus  strains and the Enterovirus assay.          Human Bocavirus Not Detected     Human Coronavirus Not Detected     Comment: The Human Coronavirus assay detects Human coronavirus types  229E, OC43,NL63 and HKU1.          RVP - Human Metapneumovirus (hMPV) Not Detected     RVP - Influenza A Not Detected     Influenza A - H1B2-17 Not Detected     RVP - Influenza B Not Detected     Parainfluenza Not Detected     Respiratory Syncytial  VirusVirus (RSV) A Not Detected     Comment: The Respiratory Syncytial Viral assay detects types A and B,  however it does not distinguish between the two.          RVP - Rhinovirus Not Detected     Comment: Cross-Reactivity has been observed between certain   Rhinovirus strains and the Enterovirus assay.  Target Enriched Mulitplex Polymerase Chain Reaction (TEM-PCR)  allows for the detection of multiple pathogens out of a single  reaction.  This test was developed and its performance   characteristics determined by [employer].  It has not   been cleared or approved by the U.[place].Food and Drug Administration.  Results should be used in conjunction with clinical findings,   and should not form the sole basis for a diagnosis or treatment  decision.  TEM-PCR is a licensed technology of [employer].         Narrative:       Receiving Lab:->[facility]    Culture, Respiratory with Gram Stain [[id number]]     Order Status:  Canceled Specimen:  Respiratory from Sputum, Expectorated           I have reviewed all pertinent labs within the past 24 hours.    Diagnostic Results:  Chest X-Ray: I personally reviewed the films and findings are: no acute process

## 2018-11-11 NOTE — ASSESSMENT & PLAN NOTE
Patient presented with AMS with LISBETH, severe anion gap metabolic acidosis, hypotension and hyperkalemia. Patient's  reporting headaches and poor PO intake prior to admission. Placed on empiric Vanc/Zosyn upon admission. Urine and blood cultures negative. Concerns for sepsis from unknown source. Transitioned to Acyclovir/Cefepime on 11/8 due to concerns for viral meningitis and for increased CNS penetration. Patient has remained afebrile over the last 24 hours.     Empiric acyclovir started for viral meningitis. CT Head 11/8 with no acute process or ICH noted.  LP 11/9 cultures pending but NGTD. LP cell count inconclusive to due traumatic tap.  Continue supportive care. Will plan to re-introduce home regimen of pain medications with oxycodone IR increased to 10 mg Q4hrs PRN pain. Continuing to avoid benzodiazepine medications to avoid further delirium. ID following, appreciate recs.

## 2018-11-11 NOTE — NURSING
Spoke to Dr. Serrano regarding pt's K of 3.4 on recent labs. Stated to MD that pt had PRN K orders but CR elevated. MD stated to hold off on K replacement. Will continue to monitor.

## 2018-11-11 NOTE — SUBJECTIVE & OBJECTIVE
Interval History:   Patient evaluated at bedside, no significant event overnight.     Review of patient's allergies indicates:   Allergen Reactions    Albuterol Palpitations    Colistin Anaphylaxis    Vancomycin analogues      Infusion reaction that does not resolve with slowing    Neupogen [filgrastim] Other (See Comments)     Ostealgia after five daily doses of 300 mcg.      Bactrim [sulfamethoxazole-trimethoprim] Hives    Ceftazidime Hives     Pt stated can tolerate cefapine not ceftazidime    Ceftazidime     Dronabinol Other (See Comments)     Mental changes/hallucinations    Haldol [haloperidol lactate] Other (See Comments)     Seizure like activity    Nsaids (non-steroidal anti-inflammatory drug)      Cannot have due to lung transplant    Adhesive Rash     Cloth tape- please use tegaderm or paper tape    Aztreonam Rash    Ciprofloxacin Nausea And Vomiting     Projectile N/V, per patient.  Unwilling to retry therapy.     Current Facility-Administered Medications   Medication Frequency    acetaminophen tablet 1,000 mg Q6H PRN    acyclovir (ZOVIRAX) 290 mg in dextrose 5 % 100 mL IVPB Daily    ARIPiprazole tablet 2 mg Daily    ceFEPIme injection 1 g Daily    enoxaparin injection 30 mg Daily    fluticasone 50 mcg/actuation nasal spray 100 mcg Daily    fluticasone-vilanterol 100-25 mcg/dose diskus inhaler 1 puff Daily    levalbuterol nebulizer solution 1.25 mg Q8H PRN    magnesium sulfate 2g in water 50mL IVPB (premix) PRN    And    magnesium sulfate 2g in water 50mL IVPB (premix) PRN    methylPREDNISolone sodium succinate injection 10 mg Daily    metoprolol tartrate (LOPRESSOR) tablet 25 mg BID    mycophenolate (CELLCEPT) 500 mg in dextrose 5 % 100 mL IVPB BID    naloxone 0.4 mg/mL injection 0.4 mg PRN    nystatin 100,000 unit/mL suspension 500,000 Units TID    ondansetron injection 8 mg Q6H PRN    oxyCODONE immediate release tablet Tab 10 mg Q4H PRN    pantoprazole EC tablet 40 mg  Daily    potassium chloride 10% oral solution 40 mEq PRN    And    potassium chloride 10% oral solution 40 mEq PRN    And    potassium chloride 10% oral solution 60 mEq PRN    promethazine (PHENERGAN) 25 mg in dextrose 5 % 50 mL IVPB Q6H PRN    simethicone chewable tablet 80 mg TID PRN    tiZANidine tablet 8 mg Q6H PRN       Objective:     Vital Signs (Most Recent):  Temp: 98.8 °F (37.1 °C) (11/11/18 1156)  Pulse: 61 (11/11/18 1156)  Resp: 18 (11/11/18 1156)  BP: (!) 169/93 (11/11/18 1156)  SpO2: 95 % (11/11/18 1156)  O2 Device (Oxygen Therapy): room air (11/11/18 1156) Vital Signs (24h Range):  Temp:  [98.4 °F (36.9 °C)-100 °F (37.8 °C)] 98.8 °F (37.1 °C)  Pulse:  [61-88] 61  Resp:  [15-25] 18  SpO2:  [95 %-96 %] 95 %  BP: (148-169)/(82-93) 169/93     Weight: 61.7 kg (136 lb 0.4 oz) (11/11/18 0351)  Body mass index is 25.7 kg/m².  Body surface area is 1.63 meters squared.    I/O last 3 completed shifts:  In: 630 [P.O.:180; IV Piggyback:450]  Out: 1025 [Urine:1025]    Physical Exam   Constitutional: She appears well-developed. She is uncooperative. She does not have a sickly appearance. No distress. Nasal cannula in place.   HENT:   Head: Normocephalic and atraumatic.   Mouth/Throat: No oropharyngeal exudate.   Eyes: Right eye exhibits no discharge. Left eye exhibits no discharge.   Neck: Normal range of motion. Neck supple. No JVD present.   Cardiovascular: Normal rate, regular rhythm, normal heart sounds and intact distal pulses.   No murmur heard.  Pulmonary/Chest: Effort normal and breath sounds normal. She has no wheezes. She has no rhonchi. She has no rales.   Abdominal: Soft. Bowel sounds are normal. She exhibits no distension. There is no tenderness. There is no guarding.   Musculoskeletal: Normal range of motion. She exhibits no edema.   Neurological: She is alert. No cranial nerve deficit.   Skin: Skin is warm and dry. No rash noted. She is not diaphoretic. No erythema. There is pallor.    Psychiatric: Her speech is normal. Her mood appears not anxious. She does not exhibit a depressed mood.   Nursing note and vitals reviewed.      Significant Labs:  ABGs:   Recent Labs   Lab 11/09/18  0815   PH 7.451*   PCO2 50.1*   HCO3 34.9*   POCSATURATED 65*   BE 11     BMP:   Recent Labs   Lab 11/11/18  0500         CO2 28   BUN 22*   CREATININE 4.2*   CALCIUM 7.6*   MG 2.1     CBC:   Recent Labs   Lab 11/11/18  0500   WBC 5.58   RBC 2.90*   HGB 7.9*   HCT 26.0*   PLT 71*   MCV 90   MCH 27.2   MCHC 30.4*     CMP:   Recent Labs   Lab 11/07/18  0312  11/11/18  0500   *   < > 108   CALCIUM 8.0*   < > 7.6*   ALBUMIN 3.2*   < > 2.5*   PROT 6.0  --   --       < > 144   K 6.5*   < > 3.2*   CO2 9*   < > 28   *   < > 106   BUN 54*   < > 22*   CREATININE 6.1*   < > 4.2*   ALKPHOS 166*  --   --    ALT 17  --   --    AST 18  --   --    BILITOT 0.3  --   --     < > = values in this interval not displayed.     All labs within the past 24 hours have been reviewed.

## 2018-11-11 NOTE — PLAN OF CARE
Pt AAO x4 throughout shift. Pt saleh removed and patient able to void spontaneously without issue. Pt with new large area of bruising to L elbow and forearm. PA made aware. Pt up with stand by assistance throughout shift. Oxy dose increased to 10mg from 5mg.

## 2018-11-11 NOTE — ASSESSMENT & PLAN NOTE
FEV1 with persistent decline on outpatient PFTs. Currently on 2L NC. Her acute respiratory failure likely secondary to opioid overdose + acute renal failure. Received narcan x 2 en route to [facility] [place] and was placed on narcan gtt with minimal improvement in mental status. Mental status has improved following round of CRRT and sodium bicarb gtt.     Mental status currently at baseline. Encouraged patient to begin to ambulate as tolerated. Currently requires 2L NC intermittently. Wean oxygen to maintain O2 sats >88%.

## 2018-11-11 NOTE — ASSESSMENT & PLAN NOTE
Likely multifactorial 2/2 poor PO intake from acute illness coupled with nephrotoxic medications. Initially received 4L LR with bicarb gtt with minimal UOP. Received SLED 11/7 and discontinued 11/8 due to resolution of acidosis and hyperkalemia.     Cr increased to 4.2 today. Will plan to continue to hold tacrolimus. Continue to closely monitor UOP. No need for urgent CRRT or SLED today. Nephrology following, appreciate recs.

## 2018-11-11 NOTE — NURSING
Pt given clean catch urine collection kit and instructed to wipe with wipes provided and collect a clean urine sample. Pt to call once urine collected for RN to collect sample.

## 2018-11-12 NOTE — ASSESSMENT & PLAN NOTE
Likely multifactorial 2/2 poor PO intake from acute illness coupled with nephrotoxic medications. Initially received 4L LR with bicarb gtt with minimal UOP. Received SLED 11/7 and discontinued 11/8 due to resolution of acidosis and hyperkalemia.     Creatinine stable at 4.1. No need for urgent CRRT or SLED today as her UOP has remained adequate. Nephrology following, appreciate recs.

## 2018-11-12 NOTE — PROGRESS NOTES
[facility]  Nephrology  Progress Note    Patient Name: Juanita Ibarra  MRN: [id number]  Admission Date: 11/7/2018  Hospital Length of Stay: 5 days  Attending Provider: Jake Alvarez MD   Primary Care Physician: Jake Alvarez MD  Principal Problem:Acute metabolic encephalopathy    Subjective:     HPI: The patient is a  28 year-old  female with a medical history of BLT secondary to CF. 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 Patient presents as a transfer from [facility] in [place] on 11/7 after presenting there with a 3 day history of lethargy and dizziness per spouse. The patient has a history of multiple hospitalizations 2/2 PNA and/or dyspnea over the last couple of months. The patient was somnolent during assessment, therefore, majority of HPI is from her spouse and records. The spouse states that the patient had been feeling nauseated for the last 3 days. He states that she also stopped eating during that time. Despite her decreased appetite, the spouse continued to give her all of her prescribed medications medications including her Morphine as normal. On admit to the OSH, her intial BP was 83/52, , and SPO2 was 95% on ?room air. According to the records, she also had a ?LISBETH and was hyperkalemic. She was given calcium gluconate, insulin, D50, and Kayexalate at the OSH. The patient was also given a total of 4 L of ?IVFs for her BP and was started on Zosyn. She was then transferred to [facility] via flight care team overnight for an higher level of care. She was started on a bicarb gtt 2/2 severe metabolic acidosis and narcan drip 2/2 hypercapnic respiratory failure. ABG on admit was 6.9/59 and her bicarb was 12. ABG has improved to 7.2/48.6 and bicarb to 21.8 since being on bicarb drip. Patient on 2 L via  "NC.  The patient was hyperkalemic, 6.5, on admit, and was shifted. Potasium 5.1 after medications. Nephrology consulted for LISBETH and severe metabolic acidosis. The patient's spouse denies a history of kidney issues. Her baseline SCr from 2015 to 2018 has been around ~ 1.0. Her last appointment visits in October revealed a SCr of 1.5 and 1.8. The patient's  did state that a couple of months ago the patient was found to have a LISBETH during a outpatient appointment in [place]. He states that she was sent to ED and was given fluids and discharged home. He denied any issues around that time leading to her LISBETH and states it was "randomly" found on labs. The patient was admitted with a SCr of 6.1. Urine output has been between 10-30 ml/hr. She put out a total of 300 ml during night shift.     Nephrology consulted for management of LISBETH and metabolic acidosis.         Interval History: NAEON. Patient progressing well. Patient alert and oriented x 3. No difficulties noted with breathing. Urine output 1.4 L/24 hr. SCr stable at 4.1, still elevated above baseline. Last SLED treatment, 11/7.     Review of patient's allergies indicates:   Allergen Reactions    Albuterol Palpitations    Colistin Anaphylaxis    Vancomycin analogues      Infusion reaction that does not resolve with slowing    Neupogen [filgrastim] Other (See Comments)     Ostealgia after five daily doses of 300 mcg.      Bactrim [sulfamethoxazole-trimethoprim] Hives    Ceftazidime Hives     Pt stated can tolerate cefapine not ceftazidime    Ceftazidime     Dronabinol Other (See Comments)     Mental changes/hallucinations    Haldol [haloperidol lactate] Other (See Comments)     Seizure like activity    Nsaids (non-steroidal anti-inflammatory drug)      Cannot have due to lung transplant    Adhesive Rash     Cloth tape- please use tegaderm or paper tape    Aztreonam Rash    Ciprofloxacin Nausea And Vomiting     Projectile N/V, per patient.  " Unwilling to retry therapy.     Current Facility-Administered Medications   Medication Frequency    acetaminophen tablet 1,000 mg Q6H PRN    acyclovir (ZOVIRAX) 290 mg in dextrose 5 % 100 mL IVPB Daily    ceFEPIme injection 1 g Daily    enoxaparin injection 30 mg Daily    fluticasone 50 mcg/actuation nasal spray 100 mcg Daily    fluticasone-vilanterol 100-25 mcg/dose diskus inhaler 1 puff Daily    heparin, porcine (PF) 100 unit/mL injection flush 100 Units PRN    levalbuterol nebulizer solution 1.25 mg Q8H PRN    lipase-protease-amylase 12,000-38,000-60,000 units per capsule 5 capsule TID WM    magnesium sulfate 2g in water 50mL IVPB (premix) PRN    And    magnesium sulfate 2g in water 50mL IVPB (premix) PRN    metoprolol tartrate (LOPRESSOR) tablet 25 mg BID    mycophenolate capsule 500 mg BID    naloxone 0.4 mg/mL injection 0.4 mg PRN    nystatin 100,000 unit/mL suspension 500,000 Units TID    ondansetron injection 8 mg Q6H PRN    oxyCODONE immediate release tablet Tab 10 mg Q4H PRN    pantoprazole EC tablet 40 mg Daily    potassium chloride 10% oral solution 40 mEq PRN    And    potassium chloride 10% oral solution 40 mEq PRN    And    potassium chloride 10% oral solution 60 mEq PRN    predniSONE tablet 5 mg Daily    promethazine (PHENERGAN) 25 mg in dextrose 5 % 50 mL IVPB Q6H PRN    simethicone chewable tablet 80 mg TID PRN    tiZANidine tablet 8 mg Q6H PRN       Objective:     Vital Signs (Most Recent):  Temp: 98.2 °F (36.8 °C) (11/12/18 0904)  Pulse: 60 (11/12/18 0904)  Resp: 18 (11/12/18 0904)  BP: (!) 169/90 (11/12/18 0904)  SpO2: 98 % (11/12/18 0904)  O2 Device (Oxygen Therapy): room air (11/12/18 0904) Vital Signs (24h Range):  Temp:  [98.2 °F (36.8 °C)-99.1 °F (37.3 °C)] 98.2 °F (36.8 °C)  Pulse:  [58-90] 60  Resp:  [15-18] 18  SpO2:  [94 %-98 %] 98 %  BP: (132-169)/(78-93) 169/90     Weight: 60.8 kg (134 lb 0.6 oz) (11/12/18 0500)  Body mass index is 25.33 kg/m².  Body surface  area is 1.62 meters squared.    I/O last 3 completed shifts:  In: 1650 [P.O.:900; IV Piggyback:750]  Out: 1900 [Urine:1900]    Physical Exam   Constitutional: She is oriented to person, place, and time. She appears well-developed. She is cooperative. She does not have a sickly appearance. No distress. Nasal cannula in place.   HENT:   Head: Normocephalic and atraumatic.   Mouth/Throat: No oropharyngeal exudate.   Eyes: Right eye exhibits no discharge. Left eye exhibits no discharge.   Neck: Normal range of motion. Neck supple. No JVD present.   Cardiovascular: Normal rate, regular rhythm, normal heart sounds and intact distal pulses.   No murmur heard.  Pulmonary/Chest: Effort normal and breath sounds normal. No respiratory distress. She has no wheezes. She has no rhonchi. She has no rales.   Abdominal: Soft. Bowel sounds are normal. She exhibits no distension. There is no tenderness. There is no guarding.   Musculoskeletal: Normal range of motion. She exhibits no edema.   Neurological: She is alert and oriented to person, place, and time.   Skin: Skin is warm and dry. No rash noted. She is not diaphoretic. No erythema. There is pallor.   Psychiatric: She has a normal mood and affect. Her speech is normal and behavior is normal. Her mood appears not anxious. She does not exhibit a depressed mood.   Nursing note and vitals reviewed.      Significant Labs:  CBC:   Recent Labs   Lab 11/12/18  0500   WBC 5.02   RBC 2.79*   HGB 7.5*   HCT 25.2*   PLT 78*   MCV 90   MCH 26.9*   MCHC 29.8*     CMP:   Recent Labs   Lab 11/07/18  0312  11/12/18  0500   *   < > 99   CALCIUM 8.0*   < > 7.9*   ALBUMIN 3.2*   < > 2.7*   PROT 6.0  --   --       < > 143   K 6.5*   < > 3.1*   CO2 9*   < > 27   *   < > 106   BUN 54*   < > 23*   CREATININE 6.1*   < > 4.1*   ALKPHOS 166*  --   --    ALT 17  --   --    AST 18  --   --    BILITOT 0.3  --   --     < > = values in this interval not displayed.          Assessment/Plan:      Acute kidney failure    The patient is a  28 year-old  female with medical history of BLT secondary to CF. 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 Patient presents as a transfer from [facility] in [place] on 11/7 after presenting there with a 3 day history of lethargy and dizziness per spouse. She was transferred [place] [facility] [place] care team overnight. She was started on a bicarb gtt 2/2 severe metabolic acidosis and narcan drip 2/2 hypercapnic respiratory failure. ABG on admit was 6.9/59 and her bicarb was 12. ABG improved to 7.2/48.6 and bicarb to 21.8 since being on bicarb drip. Patient on 2 L via NC. The patient was hyperkalemic, 6.5, on admit, and was shifted. Potasium 5.1 after medications. Nephrology consulted for LISBETH and severe metabolic acidosis. The patient's spouse denies a history of kidney issues. Her baseline SCr from 2015 to 2018 has been around ~ 1.0. Her appointment visits in October revealed a SCr of 1.5 and 1.8. The patient was admitted with a SCr of 6.1. Urine outputt has been between 10-30 ml/hr. She put out a total of 300 ml during night shift. UA today with 2+ protein. UPCR pending. LISBETH secondary to ATN likely caused by hypotension/sepsis in the setting of severe metabolic acidosis.     Plan:   No urgent need for dialysis, patient with improved renal status.   Patient with adequate urine output. SCr stable at 4.1 but above baseline.   Urinary catheter removed on 11/10, strict I/Os. Improved UOP, 1.4 L/24 hr.   Renal US completed on 11/7/18, showing reduced perfusion bilaterally   Urine creatinine/protein ratio was 0.51  Avoid NSAIDs, contrast, nephrotoxins   Monitor strict I/Os, daily weights  Renally dose medications to current GFR       Case discussed with staff further recs with attestation.     I will follow-up with  patient. Please contact us if you have any additional questions.    GUSTABO Lunsford DNP, HAFSA, FNP-C  Department of Nephrology  [facility]  Pager: [phone]

## 2018-11-12 NOTE — ASSESSMENT & PLAN NOTE
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 Patient presents as a transfer from [facility] in [place] on 11/7 after presenting there with a 3 day history of lethargy and dizziness per spouse. [place] [facility] [place]. She was started on a bicarb gtt 2/2 severe metabolic acidosis and narcan drip 2/2 hypercapnic respiratory failure. ABG on admit was 6.9/59 and her bicarb was 12. ABG improved to 7.2/48.6 and bicarb to 21.8 since being on bicarb drip. Patient on 2 L via NC. The patient was hyperkalemic, 6.5, on admit, and was shifted. Potasium 5.1 after medications. Nephrology consulted for LISBETH and severe metabolic acidosis. The patient's spouse denies a history of kidney issues. Her baseline SCr from 2015 to 2018 has been around ~ 1.0. Her appointment visits in October revealed a SCr of 1.5 and 1.8. The patient was admitted with a SCr of 6.1. Urine outputt has been between 10-30 ml/hr. She put out a total of 300 ml during night shift. UA today with 2+ protein. UPCR pending. LISBETH secondary to ATN likely caused by hypotension/sepsis in the setting of severe metabolic acidosis.     Plan:   No urgent need for dialysis, patient progressing slowly.   Patient with adequate urine output. SCr stable at 4.1 but above baseline.   Urinary catheter removed on 11/10, strict I/Os. Improved UOP, 1.4 L/24 hr.   Renal US completed on 11/7/18, showing reduced perfusion bilaterally   Urine creatinine/protein ratio was 0.51  Avoid NSAIDs, contrast, nephrotoxins   Monitor strict I/Os, daily weights  Renally dose medications to current GFR

## 2018-11-12 NOTE — PROGRESS NOTES
Notified KEARA DUARTE of the following:  Patient's /89, HR 60's. PA to review medications. Will continue to monitor.

## 2018-11-12 NOTE — PHYSICIAN QUERY
PT Name: Juanita Ibarra  MR #: [id number]     Physician Query Form - Diagnosis Clarification      CDS/: Krupa Fish               Contact information: [email or web address]     This form is a permanent document in the medical record.     Query Date: November 12, 2018    By submitting this query, we are merely seeking further clarification of documentation.  Please utilize your independent clinical judgment when addressing the question(s) below.     The medical record contains the following:      Findings Supporting Clinical Information Location in Medical Record   viral meningitis.     Empiric acyclovir started for viral meningitis. CT Head 11/8 with no acute process or ICH noted.  LP 11/9 cultures pending but NGTD. LP cell count inconclusive to due traumatic tap.  Continue supportive care    No Growth to date P   Gram Stain Result Rare WBC's   Gram Stain Result No organisms seen      Transplant PN 11/11    Transplant PN 11/11              CSF culture 11/7     Please clarify if the __viral meningitis ______ diagnosis has been:     Ruled In    Ruled In, Now Resolved   x Ruled Out    Other/Clarification of findings (please specify):    Clinically insignificant      Clinically undetermined     Please document in your progress notes daily for the duration of treatment, until resolved, and include in your discharge summary.

## 2018-11-12 NOTE — ASSESSMENT & PLAN NOTE
FEV1 with persistent decline on outpatient PFTs. Currently on 2L NC. Her acute respiratory failure likely secondary to opioid overdose + acute renal failure. Received narcan x 2 [place] [facility] [place] on narcan gtt with minimal improvement in mental status. Mental status has improved following round of CRRT and sodium bicarb gtt.     Mental status currently at baseline. Encouraged patient to begin to ambulate as tolerated. Intermittently requires 2L NC. Maintain O2 sats >88%.

## 2018-11-12 NOTE — PROGRESS NOTES
[facility]  Lung Transplant  Progress Note - Floor    Patient Name: Juanita Ibarra  MRN: [id number]  Admission Date: 11/7/2018  Hospital Length of Stay: 5 days  Post-Operative Day: 1614  Attending Physician: Jake Alvarez MD  Primary Care Provider: Jake Alvarez MD     Subjective:     Interval History: No acute events overnight. Patient reports feeling well this morning, but complains of insomnia due to not taking her home medications. She reports intermittent, dry cough, unchanged from her baseline. She also reports vague abdominal pain, improved from yesterday. Nausea has resolved.       Continuous Infusions:    Scheduled Meds:   acyclovir (ZOVIRAX) IVPB (wt 35 - 70 kg)  5 mg/kg Intravenous Daily    ARIPiprazole  2 mg Oral Daily    ceFEPime (MAXIPIME) IVPB  1 g Intravenous Daily    enoxaparin  30 mg Subcutaneous Daily    fluticasone  2 spray Each Nare Daily    fluticasone-vilanterol  1 puff Inhalation Daily    gabapentin  300 mg Oral QHS    lipase-protease-amylase 12,000-38,000-60,000 units  5 capsule Oral TID WM    metoprolol tartrate  25 mg Oral BID    morphine  15 mg Oral Q12H    mycophenolate  500 mg Oral BID    nystatin  500,000 Units Oral TID    pantoprazole  40 mg Oral Daily    predniSONE  5 mg Oral Daily    tacrolimus  0.5 mg Oral BID    topiramate  25 mg Oral BID    vancomycin (VANCOCIN) IVPB  750 mg Intravenous Once    venlafaxine  150 mg Oral QHS     PRN Meds:acetaminophen, heparin, porcine (PF), levalbuterol, LORazepam, magnesium sulfate IVPB **AND** magnesium sulfate IVPB, naloxone, ondansetron, oxyCODONE, potassium chloride 10% **AND** potassium chloride 10% **AND** potassium chloride 10%, promethazine (PHENERGAN) IVPB, simethicone, tiZANidine    Review of patient's allergies indicates:   Allergen Reactions    Albuterol Palpitations    Colistin Anaphylaxis    Vancomycin analogues      Infusion reaction that does not resolve with slowing     Neupogen [filgrastim] Other (See Comments)     Ostealgia after five daily doses of 300 mcg.      Bactrim [sulfamethoxazole-trimethoprim] Hives    Ceftazidime Hives     Pt stated can tolerate cefapine not ceftazidime    Ceftazidime     Dronabinol Other (See Comments)     Mental changes/hallucinations    Haldol [haloperidol lactate] Other (See Comments)     Seizure like activity    Nsaids (non-steroidal anti-inflammatory drug)      Cannot have due to lung transplant    Adhesive Rash     Cloth tape- please use tegaderm or paper tape    Aztreonam Rash    Ciprofloxacin Nausea And Vomiting     Projectile N/V, per patient.  Unwilling to retry therapy.       Review of Systems   Constitutional: Negative for activity change, appetite change, chills, diaphoresis, fatigue and fever.   HENT: Negative for congestion, rhinorrhea, sore throat and trouble swallowing.    Eyes: Negative for discharge and redness.   Respiratory: Positive for cough. Negative for choking, shortness of breath, wheezing and stridor.    Cardiovascular: Negative for chest pain, palpitations and leg swelling.   Gastrointestinal: Negative for abdominal pain, constipation, diarrhea, nausea and vomiting.   Endocrine: Negative for polydipsia and polyuria.   Genitourinary: Negative for decreased urine volume, dysuria, flank pain and urgency.   Allergic/Immunologic: Positive for immunocompromised state.   Neurological: Positive for weakness. Negative for dizziness, seizures, speech difficulty, numbness and headaches.   Hematological: Bruises/bleeds easily.   Psychiatric/Behavioral: Positive for decreased concentration and sleep disturbance. Negative for behavioral problems, confusion and hallucinations.     Objective:   Physical Exam   Constitutional: She is oriented to person, place, and time. She appears well-developed and well-nourished.  Non-toxic appearance. She does not have a sickly appearance. She does not appear ill. No distress.   HENT:   Head:  Atraumatic.   Right Ear: External ear normal.   Left Ear: External ear normal.   Nose: Nose normal.   Mouth/Throat: Oropharynx is clear and moist.   Eyes: Conjunctivae and EOM are normal. No scleral icterus.   Neck: Normal range of motion. Neck supple. No JVD present. No tracheal deviation present.   Cardiovascular: Regular rhythm, normal heart sounds and intact distal pulses. Tachycardia present. Exam reveals no gallop and no friction rub.   No murmur heard.  Pulmonary/Chest: Effort normal. No stridor. She has no decreased breath sounds. She has no wheezes. She has no rhonchi. She has no rales.   Abdominal: Soft. Bowel sounds are normal. She exhibits no distension. There is tenderness (minimal, diffuse).   Musculoskeletal: Normal range of motion. She exhibits no edema, tenderness or deformity.   Neurological: She is alert and oriented to person, place, and time. No cranial nerve deficit.   Skin: Skin is warm and dry. No rash noted. She is not diaphoretic. No erythema. No pallor.   Psychiatric: She has a normal mood and affect. Her behavior is normal. Judgment and thought content normal.   Nursing note and vitals reviewed.        Vital Signs (Most Recent):  Temp: 98.2 °F (36.8 °C) (11/12/18 0904)  Pulse: 60 (11/12/18 0904)  Resp: 18 (11/12/18 0904)  BP: (!) 169/90 (11/12/18 0904)  SpO2: 98 % (11/12/18 0904) Vital Signs (24h Range):  Temp:  [98.2 °F (36.8 °C)-99.1 °F (37.3 °C)] 98.2 °F (36.8 °C)  Pulse:  [58-90] 60  Resp:  [15-18] 18  SpO2:  [94 %-98 %] 98 %  BP: (132-169)/(78-93) 169/90     Weight: 60.8 kg (134 lb 0.6 oz)  Body mass index is 25.33 kg/m².      Intake/Output Summary (Last 24 hours) at 11/12/2018 1040  Last data filed at 11/12/2018 1000  Gross per 24 hour   Intake 1320 ml   Output 1225 ml   Net 95 ml       Ventilator Data:          Hemodynamic Parameters:       Lines/Drains:       Port A Cath Single Lumen right subclavian (Active)   Dressing Type Transparent 11/8/2018 11:15 AM   Dressing Status  Clean;Dry;Intact 11/8/2018 11:15 AM   Dressing Intervention Dressing reinforced 11/8/2018  3:05 AM   Dressing Change Due 10/06/18 10/4/2018  8:00 PM   Line Status Infusing 11/8/2018 11:15 AM   Flush Performed Yes 11/8/2018 11:15 AM   Date to be Reflushed 10/04/18 10/3/2018  8:00 PM   Daily Line Review Performed 11/8/2018 11:15 AM   Type of Needle Nicole 11/8/2018 11:15 AM   Gauge 20 11/8/2018 11:15 AM   Needle Length 3/4 in 10/1/2018  8:00 AM   Needle Status Left in place 11/8/2018 11:15 AM   Needle Insertion Date 09/29/18 10/1/2018  8:00 AM   Needle Insertion Time 1800 10/1/2018  8:00 AM   Needle Removal Date 09/29/18 9/29/2018 11:00 AM   Needle Removal Time 1136 9/29/2018 11:00 AM   Number of days:             Trialysis (Dialysis) Catheter 11/07/18 1023 right femoral (Active)   IV Device Securement sutures 11/8/2018 11:15 AM   Additional Site Signs no drainage;no streak formation;no palpable cord;no pain;no edema;no warmth;no erythema 11/8/2018 11:15 AM   Patency/Care flushed w/o difficulty 11/8/2018 11:15 AM   Site Assessment Clean;Dry;Intact;No redness;No swelling 11/8/2018 11:15 AM   Status Accessed 11/8/2018 11:15 AM   Flows Good 11/8/2018 11:15 AM   Dressing Intervention Dressing reinforced 11/8/2018  3:05 AM   Dressing Status Clean;Dry;Intact;Biopatch in place 11/8/2018 11:15 AM   Dressing Change Due 11/14/18 11/8/2018 11:15 AM   Verification by X-ray Yes 11/7/2018 10:30 AM   Site Condition No complications 11/8/2018 11:15 AM   Dressing Occlusive 11/8/2018 11:15 AM   Daily Line Review Performed 11/8/2018 11:15 AM   Number of days: 1            Peripheral IV - Single Lumen 11/07/18 0200 Right Hand (Active)   Site Assessment Clean;Dry;Intact;No redness;No swelling 11/8/2018 11:15 AM   Line Status Saline locked 11/8/2018 11:15 AM   Dressing Status Intact;Dry;Clean 11/8/2018 11:15 AM   Dressing Intervention New dressing 11/8/2018  3:05 AM   Dressing Change Due 11/11/18 11/8/2018  3:05 AM   Site Change Due  "11/11/18 11/8/2018 11:15 AM   Reason Not Rotated Not due 11/8/2018 11:15 AM   Number of days: 1            Urethral Catheter 11/07/18 0300 (Active)   Site Assessment Clean;Intact 11/8/2018 11:15 AM   Collection Container Urimeter 11/8/2018 11:15 AM   Securement Method secured to top of thigh w/ adhesive device 11/8/2018 11:15 AM   Catheter Care Performed yes 11/8/2018 11:15 AM   Reason for Continuing Urinary Catheterization Critically ill in ICU requiring intensive monitoring 11/8/2018 11:15 AM   CAUTI Prevention Bundle StatLock in place w 1" slack;Intact seal between catheter & drainage tubing;Drainage bag off the floor;Green sheeting clip in use;No dependent loops or kinks;Drainage bag not overfilled (<2/3 full);Drainage bag below bladder 11/8/2018 11:15 AM   Output (mL) 5 mL 11/8/2018 12:00 PM   Number of days: 1       Significant Labs:  CBC:  Recent Labs   Lab 11/12/18  0500   WBC 5.02   RBC 2.79*   HGB 7.5*   HCT 25.2*   PLT 78*   MCV 90   MCH 26.9*   MCHC 29.8*     BMP:  Recent Labs   Lab 11/12/18  0500      K 3.1*      CO2 27   BUN 23*   CREATININE 4.1*   CALCIUM 7.9*      Tacrolimus Levels:  Recent Labs   Lab 11/12/18  0500   TACROLIMUS <1.5*     Microbiology:  Microbiology Results (last 7 days)     Procedure Component Value Units Date/Time    CSF culture [[id number]] Collected:  11/09/18 1657    Order Status:  Completed Specimen:  CSF (Spinal Fluid) from CSF Tap, Tube 3 Updated:  11/12/18 0753     CSF CULTURE No Growth to date     Gram Stain Result Rare WBC's      No organisms seen    Blood culture [[id number]] Collected:  11/07/18 0252    Order Status:  Completed Specimen:  Blood from Peripheral, Wrist, Right Updated:  11/12/18 0612     Blood Culture, Routine No growth after 5 days.    Blood culture [[id number]] Collected:  11/07/18 0200    Order Status:  Completed Specimen:  Blood from Peripheral, Antecubital, Right Updated:  11/12/18 0612     Blood Culture, Routine No growth after 5 days.    " Urine culture [[id number]] Collected:  11/11/18 1636    Order Status:  No result Specimen:  Urine Updated:  11/11/18 1755    Cryptococcal antigen, CSF [[id number]] Collected:  11/09/18 1657    Order Status:  Completed Specimen:  CSF (Spinal Fluid) from CSF Tap, Tube 3 Updated:  11/10/18 1405     Crypto Ag, CSF Negative    Fungus culture [[id number]] Collected:  11/09/18 1657    Order Status:  Sent Specimen:  CSF (Spinal Fluid) from CSF Tap, Tube 3 Updated:  11/09/18 1809    Gram stain [[id number]] Collected:  11/09/18 1657    Order Status:  Canceled Specimen:  CSF (Spinal Fluid) from CSF Tap, Tube 3 Updated:  11/09/18 1809    Cryptococcal antigen [[id number]] Collected:  11/08/18 1641    Order Status:  Completed Specimen:  Blood Updated:  11/09/18 1438     Cryptococcal Ag, Blood Negative    Fungus Culture, Blood or Bone Marrow [[id number]] Collected:  11/07/18 1715    Order Status:  Completed Specimen:  Blood Updated:  11/08/18 1323     Fungus Cult, blood or BM Culture in progress    Respiratory Viral Panel by PCR [facility]; Nasal Swab [[id number]] Collected:  11/07/18 0346    Order Status:  Completed Specimen:  Respiratory Updated:  11/08/18 1212     Respiratory Virus Panel, source Nasal Swab     RVP - Adenovirus Not Detected     Comment: Detects Serotypes B and E. Detection of Serotype C may   be limited. If Adenovirus infection is suspected and a   Not Detected result is returned the sample should be   re-tested for Adenovirus using an independent method  (e.g. [place] Adenovirus Quantitative Real-Time  PCR test.          Enterovirus Not Detected     Comment: Cross-reactivity has been observed between certain Rhinovirus  strains and the Enterovirus assay.          Human Bocavirus Not Detected     Human Coronavirus Not Detected     Comment: The Human Coronavirus assay detects Human coronavirus types  229E, OC43,NL63 and HKU1.          RVP - Human Metapneumovirus (hMPV) Not Detected     RVP - Influenza A Not  Detected     Influenza A - H2R3-94 Not Detected     RVP - Influenza B Not Detected     Parainfluenza Not Detected     Respiratory Syncytial VirusVirus (RSV) A Not Detected     Comment: The Respiratory Syncytial Viral assay detects types A and B,  however it does not distinguish between the two.          RVP - Rhinovirus Not Detected     Comment: Cross-Reactivity has been observed between certain   Rhinovirus strains and the Enterovirus assay.  Target Enriched Mulitplex Polymerase Chain Reaction (TEM-PCR)  allows for the detection of multiple pathogens out of a single  reaction.  This test was developed and its performance   characteristics determined by [employer].  It has not   been cleared or approved by the U.S.Food and Drug Administration.  Results should be used in conjunction with clinical findings,   and should not form the sole basis for a diagnosis or treatment  decision.  TEM-PCR is a licensed technology of [employer].         Narrative:       Receiving Lab:->[facility]    Culture, Respiratory with Gram Stain [[id number]]     Order Status:  Canceled Specimen:  Respiratory from Sputum, Expectorated           I have reviewed all pertinent labs within the past 24 hours.    Diagnostic Results:  Chest X-Ray: I personally reviewed the films and findings are: no acute process      Assessment/Plan:     * Acute metabolic encephalopathy    Likely secondary to acute renal failure and hypoventilation related to opioid overdose. Received SLED 11/7 with slight improvement in mental status.     Mental status has returned to baseline this morning. Will continue to closely monitor neuro status. Continue acyclovir for concern for viral meningitis. Previous HSV studies in 2013 negative. Varicella IgG positive in 2013. CT Head 11/8 negative for acute process. LP on 11/9 - cultures pending.        Lung transplant status, bilateral    Underwent BLT for CF on 6/12/2014. Transplant history complicated by A1  rejection in 8/2014, recurrent candida glabrata positive sputum, MDR pseudomonas and MRSA, CMV viremia 7/2015, and CARLOS EDUARDO.     Will resume tacrolimus today, 11/12. Continue immunosuppression and ppx.      Prophylactic antibiotic    Continue dapsone 100 mg daily.     Immunosuppression    Current outpatient regimen is tacrolimus 3.5 mg BID, prednisone 5 mg daily, and  mg BID.    Tacrolimus resumed at 0.5 mg BID on 11/12. Continue prednisone 5 mg daily and  mg BID. Will continue to monitor tacrolimus levels and adjust dose as needed.      Bronchiolitis obliterans syndrome    FEV1 with persistent decline on outpatient PFTs. Currently on 2L NC. Her acute respiratory failure likely secondary to opioid overdose + acute renal failure. Received narcan x 2 en route to [facility] and was placed on narcan gtt with minimal improvement in mental status. Mental status has improved following round of CRRT and sodium bicarb gtt.     Mental status currently at baseline. Encouraged patient to begin to ambulate as tolerated. Intermittently requires 2L NC. Maintain O2 sats >88%.      Sepsis    Patient presented with AMS with LISBETH, severe anion gap metabolic acidosis, hypotension and hyperkalemia. Patient's  reporting headaches and poor PO intake prior to admission. Placed on empiric Vanc/Zosyn upon admission. Urine and blood cultures negative. Concerns for sepsis from unknown source. Transitioned to Acyclovir/Cefepime on 11/8 due to concerns for viral meningitis and for increased CNS penetration. Patient has remained afebrile over the last 24 hours.     Continue empiric acyclovir for viral meningitis until cultures finalized. CT Head 11/8 with no acute process or ICH noted.  LP 11/9 cultures pending but NGTD. LP cell count inconclusive to due traumatic tap, but concerns for bacterial meningitis given her sinus history. Continue supportive care. Will resume all home meds. Continuing to avoid benzodiazepine medications to avoid  further delirium. ID following, appreciate recs.      Acute kidney failure    Likely multifactorial 2/2 poor PO intake from acute illness coupled with nephrotoxic medications. Initially received 4L LR with bicarb gtt with minimal UOP. Received SLED 11/7 and discontinued 11/8 due to resolution of acidosis and hyperkalemia.     Creatinine stable at 4.1. No need for urgent CRRT or SLED today as her UOP has remained adequate. Nephrology following, appreciate recs.      Chronic pain with opiate use    Opioid overdose + acute renal failure from poor PO intake likely cause for acute respiratory failure and encephalopathy. Hold all sedating medications for now.     Will use IV Zofran and Phenergan at this time for relief from any opiate withdrawal symptoms. Tylenol PRN and oxycodone IR 10 mg PRN every 4 hours for pain relief. Will plan to transition to remaining home opioid regimen in the coming days while monitoring renal function and mental status.      CF related Pancreatic insufficiency    Continue Creon with meals and snacks.          Shama Canales PA-C  Lung Transplant  [facility]

## 2018-11-12 NOTE — SUBJECTIVE & OBJECTIVE
Subjective:     Interval History: No acute events overnight. Patient reports feeling well this morning, but complains of insomnia due to not taking her home medications. She reports intermittent, dry cough, unchanged from her baseline. She also reports vague abdominal pain, improved from yesterday. Nausea has resolved.       Continuous Infusions:    Scheduled Meds:   acyclovir (ZOVIRAX) IVPB (wt 35 - 70 kg)  5 mg/kg Intravenous Daily    ARIPiprazole  2 mg Oral Daily    ceFEPime (MAXIPIME) IVPB  1 g Intravenous Daily    enoxaparin  30 mg Subcutaneous Daily    fluticasone  2 spray Each Nare Daily    fluticasone-vilanterol  1 puff Inhalation Daily    gabapentin  300 mg Oral QHS    lipase-protease-amylase 12,000-38,000-60,000 units  5 capsule Oral TID WM    metoprolol tartrate  25 mg Oral BID    morphine  15 mg Oral Q12H    mycophenolate  500 mg Oral BID    nystatin  500,000 Units Oral TID    pantoprazole  40 mg Oral Daily    predniSONE  5 mg Oral Daily    tacrolimus  0.5 mg Oral BID    topiramate  25 mg Oral BID    vancomycin (VANCOCIN) IVPB  750 mg Intravenous Once    venlafaxine  150 mg Oral QHS     PRN Meds:acetaminophen, heparin, porcine (PF), levalbuterol, LORazepam, magnesium sulfate IVPB **AND** magnesium sulfate IVPB, naloxone, ondansetron, oxyCODONE, potassium chloride 10% **AND** potassium chloride 10% **AND** potassium chloride 10%, promethazine (PHENERGAN) IVPB, simethicone, tiZANidine    Review of patient's allergies indicates:   Allergen Reactions    Albuterol Palpitations    Colistin Anaphylaxis    Vancomycin analogues      Infusion reaction that does not resolve with slowing    Neupogen [filgrastim] Other (See Comments)     Ostealgia after five daily doses of 300 mcg.      Bactrim [sulfamethoxazole-trimethoprim] Hives    Ceftazidime Hives     Pt stated can tolerate cefapine not ceftazidime    Ceftazidime     Dronabinol Other (See Comments)     Mental changes/hallucinations     Haldol [haloperidol lactate] Other (See Comments)     Seizure like activity    Nsaids (non-steroidal anti-inflammatory drug)      Cannot have due to lung transplant    Adhesive Rash     Cloth tape- please use tegaderm or paper tape    Aztreonam Rash    Ciprofloxacin Nausea And Vomiting     Projectile N/V, per patient.  Unwilling to retry therapy.       Review of Systems   Constitutional: Negative for activity change, appetite change, chills, diaphoresis, fatigue and fever.   HENT: Negative for congestion, rhinorrhea, sore throat and trouble swallowing.    Eyes: Negative for discharge and redness.   Respiratory: Positive for cough. Negative for choking, shortness of breath, wheezing and stridor.    Cardiovascular: Negative for chest pain, palpitations and leg swelling.   Gastrointestinal: Negative for abdominal pain, constipation, diarrhea, nausea and vomiting.   Endocrine: Negative for polydipsia and polyuria.   Genitourinary: Negative for decreased urine volume, dysuria, flank pain and urgency.   Allergic/Immunologic: Positive for immunocompromised state.   Neurological: Positive for weakness. Negative for dizziness, seizures, speech difficulty, numbness and headaches.   Hematological: Bruises/bleeds easily.   Psychiatric/Behavioral: Positive for decreased concentration and sleep disturbance. Negative for behavioral problems, confusion and hallucinations.     Objective:   Physical Exam   Constitutional: She is oriented to person, place, and time. She appears well-developed and well-nourished.  Non-toxic appearance. She does not have a sickly appearance. She does not appear ill. No distress.   HENT:   Head: Atraumatic.   Right Ear: External ear normal.   Left Ear: External ear normal.   Nose: Nose normal.   Mouth/Throat: Oropharynx is clear and moist.   Eyes: Conjunctivae and EOM are normal. No scleral icterus.   Neck: Normal range of motion. Neck supple. No JVD present. No tracheal deviation present.    Cardiovascular: Regular rhythm, normal heart sounds and intact distal pulses. Tachycardia present. Exam reveals no gallop and no friction rub.   No murmur heard.  Pulmonary/Chest: Effort normal. No stridor. She has no decreased breath sounds. She has no wheezes. She has no rhonchi. She has no rales.   Abdominal: Soft. Bowel sounds are normal. She exhibits no distension. There is tenderness (minimal, diffuse).   Musculoskeletal: Normal range of motion. She exhibits no edema, tenderness or deformity.   Neurological: She is alert and oriented to person, place, and time. No cranial nerve deficit.   Skin: Skin is warm and dry. No rash noted. She is not diaphoretic. No erythema. No pallor.   Psychiatric: She has a normal mood and affect. Her behavior is normal. Judgment and thought content normal.   Nursing note and vitals reviewed.        Vital Signs (Most Recent):  Temp: 98.2 °F (36.8 °C) (11/12/18 0904)  Pulse: 60 (11/12/18 0904)  Resp: 18 (11/12/18 0904)  BP: (!) 169/90 (11/12/18 0904)  SpO2: 98 % (11/12/18 0904) Vital Signs (24h Range):  Temp:  [98.2 °F (36.8 °C)-99.1 °F (37.3 °C)] 98.2 °F (36.8 °C)  Pulse:  [58-90] 60  Resp:  [15-18] 18  SpO2:  [94 %-98 %] 98 %  BP: (132-169)/(78-93) 169/90     Weight: 60.8 kg (134 lb 0.6 oz)  Body mass index is 25.33 kg/m².      Intake/Output Summary (Last 24 hours) at 11/12/2018 1040  Last data filed at 11/12/2018 1000  Gross per 24 hour   Intake 1320 ml   Output 1225 ml   Net 95 ml       Ventilator Data:          Hemodynamic Parameters:       Lines/Drains:       Port A Cath Single Lumen right subclavian (Active)   Dressing Type Transparent 11/8/2018 11:15 AM   Dressing Status Clean;Dry;Intact 11/8/2018 11:15 AM   Dressing Intervention Dressing reinforced 11/8/2018  3:05 AM   Dressing Change Due 10/06/18 10/4/2018  8:00 PM   Line Status Infusing 11/8/2018 11:15 AM   Flush Performed Yes 11/8/2018 11:15 AM   Date to be Reflushed 10/04/18 10/3/2018  8:00 PM   Daily Line Review  Performed 11/8/2018 11:15 AM   Type of Needle Nicole 11/8/2018 11:15 AM   Gauge 20 11/8/2018 11:15 AM   Needle Length 3/4 in 10/1/2018  8:00 AM   Needle Status Left in place 11/8/2018 11:15 AM   Needle Insertion Date 09/29/18 10/1/2018  8:00 AM   Needle Insertion Time 1800 10/1/2018  8:00 AM   Needle Removal Date 09/29/18 9/29/2018 11:00 AM   Needle Removal Time 1136 9/29/2018 11:00 AM   Number of days:             Trialysis (Dialysis) Catheter 11/07/18 1023 right femoral (Active)   IV Device Securement sutures 11/8/2018 11:15 AM   Additional Site Signs no drainage;no streak formation;no palpable cord;no pain;no edema;no warmth;no erythema 11/8/2018 11:15 AM   Patency/Care flushed w/o difficulty 11/8/2018 11:15 AM   Site Assessment Clean;Dry;Intact;No redness;No swelling 11/8/2018 11:15 AM   Status Accessed 11/8/2018 11:15 AM   Flows Good 11/8/2018 11:15 AM   Dressing Intervention Dressing reinforced 11/8/2018  3:05 AM   Dressing Status Clean;Dry;Intact;Biopatch in place 11/8/2018 11:15 AM   Dressing Change Due 11/14/18 11/8/2018 11:15 AM   Verification by X-ray Yes 11/7/2018 10:30 AM   Site Condition No complications 11/8/2018 11:15 AM   Dressing Occlusive 11/8/2018 11:15 AM   Daily Line Review Performed 11/8/2018 11:15 AM   Number of days: 1            Peripheral IV - Single Lumen 11/07/18 0200 Right Hand (Active)   Site Assessment Clean;Dry;Intact;No redness;No swelling 11/8/2018 11:15 AM   Line Status Saline locked 11/8/2018 11:15 AM   Dressing Status Intact;Dry;Clean 11/8/2018 11:15 AM   Dressing Intervention New dressing 11/8/2018  3:05 AM   Dressing Change Due 11/11/18 11/8/2018  3:05 AM   Site Change Due 11/11/18 11/8/2018 11:15 AM   Reason Not Rotated Not due 11/8/2018 11:15 AM   Number of days: 1            Urethral Catheter 11/07/18 0300 (Active)   Site Assessment Clean;Intact 11/8/2018 11:15 AM   Collection Container Urimeter 11/8/2018 11:15 AM   Securement Method secured to top of thigh w/ adhesive  "device 11/8/2018 11:15 AM   Catheter Care Performed yes 11/8/2018 11:15 AM   Reason for Continuing Urinary Catheterization Critically ill in ICU requiring intensive monitoring 11/8/2018 11:15 AM   CAUTI Prevention Bundle StatLock in place w 1" slack;Intact seal between catheter & drainage tubing;Drainage bag off the floor;Green sheeting clip in use;No dependent loops or kinks;Drainage bag not overfilled (<2/3 full);Drainage bag below bladder 11/8/2018 11:15 AM   Output (mL) 5 mL 11/8/2018 12:00 PM   Number of days: 1       Significant Labs:  CBC:  Recent Labs   Lab 11/12/18  0500   WBC 5.02   RBC 2.79*   HGB 7.5*   HCT 25.2*   PLT 78*   MCV 90   MCH 26.9*   MCHC 29.8*     BMP:  Recent Labs   Lab 11/12/18  0500      K 3.1*      CO2 27   BUN 23*   CREATININE 4.1*   CALCIUM 7.9*      Tacrolimus Levels:  Recent Labs   Lab 11/12/18  0500   TACROLIMUS <1.5*     Microbiology:  Microbiology Results (last 7 days)     Procedure Component Value Units Date/Time    CSF culture [[id number]] Collected:  11/09/18 1657    Order Status:  Completed Specimen:  CSF (Spinal Fluid) from CSF Tap, Tube 3 Updated:  11/12/18 0753     CSF CULTURE No Growth to date     Gram Stain Result Rare WBC's      No organisms seen    Blood culture [[id number]] Collected:  11/07/18 0252    Order Status:  Completed Specimen:  Blood from Peripheral, Wrist, Right Updated:  11/12/18 0612     Blood Culture, Routine No growth after 5 days.    Blood culture [[id number]] Collected:  11/07/18 0200    Order Status:  Completed Specimen:  Blood from Peripheral, Antecubital, Right Updated:  11/12/18 0612     Blood Culture, Routine No growth after 5 days.    Urine culture [[id number]] Collected:  11/11/18 1636    Order Status:  No result Specimen:  Urine Updated:  11/11/18 1755    Cryptococcal antigen, CSF [[id number]] Collected:  11/09/18 1657    Order Status:  Completed Specimen:  CSF (Spinal Fluid) from CSF Tap, Tube 3 Updated:  11/10/18 1405     Crypto Ag, " CSF Negative    Fungus culture [[id number]] Collected:  11/09/18 1657    Order Status:  Sent Specimen:  CSF (Spinal Fluid) from CSF Tap, Tube 3 Updated:  11/09/18 1809    Gram stain [[id number]] Collected:  11/09/18 1657    Order Status:  Canceled Specimen:  CSF (Spinal Fluid) from CSF Tap, Tube 3 Updated:  11/09/18 1809    Cryptococcal antigen [[id number]] Collected:  11/08/18 1641    Order Status:  Completed Specimen:  Blood Updated:  11/09/18 1438     Cryptococcal Ag, Blood Negative    Fungus Culture, Blood or Bone Marrow [[id number]] Collected:  11/07/18 1715    Order Status:  Completed Specimen:  Blood Updated:  11/08/18 1323     Fungus Cult, blood or BM Culture in progress    Respiratory Viral Panel by PCR [facility]; Nasal Swab [[id number]] Collected:  11/07/18 0346    Order Status:  Completed Specimen:  Respiratory Updated:  11/08/18 1212     Respiratory Virus Panel, source Nasal Swab     RVP - Adenovirus Not Detected     Comment: Detects Serotypes B and E. Detection of Serotype C may   be limited. If Adenovirus infection is suspected and a   Not Detected result is returned the sample should be   re-tested for Adenovirus using an independent method  (e.g. [employer] Adenovirus Quantitative Real-Time  PCR test.          Enterovirus Not Detected     Comment: Cross-reactivity has been observed between certain Rhinovirus  strains and the Enterovirus assay.          Human Bocavirus Not Detected     Human Coronavirus Not Detected     Comment: The Human Coronavirus assay detects Human coronavirus types  229E, OC43,NL63 and HKU1.          RVP - Human Metapneumovirus (hMPV) Not Detected     RVP - Influenza A Not Detected     Influenza A - S4U6-63 Not Detected     RVP - Influenza B Not Detected     Parainfluenza Not Detected     Respiratory Syncytial VirusVirus (RSV) A Not Detected     Comment: The Respiratory Syncytial Viral assay detects types A and B,  however it does not distinguish between the two.          RVP -  Rhinovirus Not Detected     Comment: Cross-Reactivity has been observed between certain   Rhinovirus strains and the Enterovirus assay.  Target Enriched Mulitplex Polymerase Chain Reaction (TEM-PCR)  allows for the detection of multiple pathogens out of a single  reaction.  This test was developed and its performance   characteristics determined by [employer].  It has not   been cleared or approved by the U.[place].Food and Drug Administration.  Results should be used in conjunction with clinical findings,   and should not form the sole basis for a diagnosis or treatment  decision.  TEM-PCR is a licensed technology of [employer].         Narrative:       Receiving Lab:->[facility]    Culture, Respiratory with Gram Stain [[id number]]     Order Status:  Canceled Specimen:  Respiratory from Sputum, Expectorated           I have reviewed all pertinent labs within the past 24 hours.    Diagnostic Results:  Chest X-Ray: I personally reviewed the films and findings are: no acute process

## 2018-11-12 NOTE — PLAN OF CARE
Problem: Patient Care Overview  Goal: Plan of Care Review  Outcome: Ongoing (interventions implemented as appropriate)  AAOx3, afebrile, c/o pain and nausea. Pain mildly controlled by prn pain and nausea medication. Telemetry monitor in place (NSR). Goldberg removed yesterday. Renal function panel q6h. Pt able to position self independently. Pt in lowest position, side rails up x2, non-skid foot wear in place, call light within reach, pt verbalized understanding to call RN when needed. Hand hygiene practiced per protocol. Will continue to monitor.

## 2018-11-12 NOTE — PROGRESS NOTES
Notified ROXY DUARTE of the following:  Patient gagged while attempting to take potassium pill. Patient willing to take liquid formulation. PA to update orders. Will continue to monitor.

## 2018-11-12 NOTE — SUBJECTIVE & OBJECTIVE
Interval History: NAEON. Patient progressing well. Patient alert and oriented x 3. No difficulties noted with breathing. Urine output 1.4 L/24 hr. SCr stable at 4.1, still elevated above baseline. Last SLED treatment, 11/7.     Review of patient's allergies indicates:   Allergen Reactions    Albuterol Palpitations    Colistin Anaphylaxis    Vancomycin analogues      Infusion reaction that does not resolve with slowing    Neupogen [filgrastim] Other (See Comments)     Ostealgia after five daily doses of 300 mcg.      Bactrim [sulfamethoxazole-trimethoprim] Hives    Ceftazidime Hives     Pt stated can tolerate cefapine not ceftazidime    Ceftazidime     Dronabinol Other (See Comments)     Mental changes/hallucinations    Haldol [haloperidol lactate] Other (See Comments)     Seizure like activity    Nsaids (non-steroidal anti-inflammatory drug)      Cannot have due to lung transplant    Adhesive Rash     Cloth tape- please use tegaderm or paper tape    Aztreonam Rash    Ciprofloxacin Nausea And Vomiting     Projectile N/V, per patient.  Unwilling to retry therapy.     Current Facility-Administered Medications   Medication Frequency    acetaminophen tablet 1,000 mg Q6H PRN    acyclovir (ZOVIRAX) 290 mg in dextrose 5 % 100 mL IVPB Daily    ceFEPIme injection 1 g Daily    enoxaparin injection 30 mg Daily    fluticasone 50 mcg/actuation nasal spray 100 mcg Daily    fluticasone-vilanterol 100-25 mcg/dose diskus inhaler 1 puff Daily    heparin, porcine (PF) 100 unit/mL injection flush 100 Units PRN    levalbuterol nebulizer solution 1.25 mg Q8H PRN    lipase-protease-amylase 12,000-38,000-60,000 units per capsule 5 capsule TID WM    magnesium sulfate 2g in water 50mL IVPB (premix) PRN    And    magnesium sulfate 2g in water 50mL IVPB (premix) PRN    metoprolol tartrate (LOPRESSOR) tablet 25 mg BID    mycophenolate capsule 500 mg BID    naloxone 0.4 mg/mL injection 0.4 mg PRN    nystatin 100,000  unit/mL suspension 500,000 Units TID    ondansetron injection 8 mg Q6H PRN    oxyCODONE immediate release tablet Tab 10 mg Q4H PRN    pantoprazole EC tablet 40 mg Daily    potassium chloride 10% oral solution 40 mEq PRN    And    potassium chloride 10% oral solution 40 mEq PRN    And    potassium chloride 10% oral solution 60 mEq PRN    predniSONE tablet 5 mg Daily    promethazine (PHENERGAN) 25 mg in dextrose 5 % 50 mL IVPB Q6H PRN    simethicone chewable tablet 80 mg TID PRN    tiZANidine tablet 8 mg Q6H PRN       Objective:     Vital Signs (Most Recent):  Temp: 98.2 °F (36.8 °C) (11/12/18 0904)  Pulse: 60 (11/12/18 0904)  Resp: 18 (11/12/18 0904)  BP: (!) 169/90 (11/12/18 0904)  SpO2: 98 % (11/12/18 0904)  O2 Device (Oxygen Therapy): room air (11/12/18 0904) Vital Signs (24h Range):  Temp:  [98.2 °F (36.8 °C)-99.1 °F (37.3 °C)] 98.2 °F (36.8 °C)  Pulse:  [58-90] 60  Resp:  [15-18] 18  SpO2:  [94 %-98 %] 98 %  BP: (132-169)/(78-93) 169/90     Weight: 60.8 kg (134 lb 0.6 oz) (11/12/18 0500)  Body mass index is 25.33 kg/m².  Body surface area is 1.62 meters squared.    I/O last 3 completed shifts:  In: 1650 [P.O.:900; IV Piggyback:750]  Out: 1900 [Urine:1900]    Physical Exam   Constitutional: She is oriented to person, place, and time. She appears well-developed. She is cooperative. She does not have a sickly appearance. No distress. Nasal cannula in place.   HENT:   Head: Normocephalic and atraumatic.   Mouth/Throat: No oropharyngeal exudate.   Eyes: Right eye exhibits no discharge. Left eye exhibits no discharge.   Neck: Normal range of motion. Neck supple. No JVD present.   Cardiovascular: Normal rate, regular rhythm, normal heart sounds and intact distal pulses.   No murmur heard.  Pulmonary/Chest: Effort normal and breath sounds normal. No respiratory distress. She has no wheezes. She has no rhonchi. She has no rales.   Abdominal: Soft. Bowel sounds are normal. She exhibits no distension. There is no  tenderness. There is no guarding.   Musculoskeletal: Normal range of motion. She exhibits no edema.   Neurological: She is alert and oriented to person, place, and time.   Skin: Skin is warm and dry. No rash noted. She is not diaphoretic. No erythema. There is pallor.   Psychiatric: She has a normal mood and affect. Her speech is normal and behavior is normal. Her mood appears not anxious. She does not exhibit a depressed mood.   Nursing note and vitals reviewed.      Significant Labs:  CBC:   Recent Labs   Lab 11/12/18  0500   WBC 5.02   RBC 2.79*   HGB 7.5*   HCT 25.2*   PLT 78*   MCV 90   MCH 26.9*   MCHC 29.8*     CMP:   Recent Labs   Lab 11/07/18  0312  11/12/18  0500   *   < > 99   CALCIUM 8.0*   < > 7.9*   ALBUMIN 3.2*   < > 2.7*   PROT 6.0  --   --       < > 143   K 6.5*   < > 3.1*   CO2 9*   < > 27   *   < > 106   BUN 54*   < > 23*   CREATININE 6.1*   < > 4.1*   ALKPHOS 166*  --   --    ALT 17  --   --    AST 18  --   --    BILITOT 0.3  --   --     < > = values in this interval not displayed.

## 2018-11-12 NOTE — ASSESSMENT & PLAN NOTE
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     Will resume tacrolimus today, 11/12. Continue immunosuppression and ppx.

## 2018-11-12 NOTE — ASSESSMENT & PLAN NOTE
Current outpatient regimen is tacrolimus 3.5 mg BID, prednisone 5 mg daily, and  mg BID.    Tacrolimus resumed at 0.5 mg BID on 11/12. Continue prednisone 5 mg daily and  mg BID. Will continue to monitor tacrolimus levels and adjust dose as needed.

## 2018-11-12 NOTE — ASSESSMENT & PLAN NOTE
Patient presented with AMS with LISBETH, severe anion gap metabolic acidosis, hypotension and hyperkalemia. Patient's  reporting headaches and poor PO intake prior to admission. Placed on empiric Vanc/Zosyn upon admission. Urine and blood cultures negative. Concerns for sepsis from unknown source. Transitioned to Acyclovir/Cefepime on 11/8 due to concerns for viral meningitis and for increased CNS penetration. Patient has remained afebrile over the last 24 hours.     Continue empiric acyclovir for viral meningitis until cultures finalized. CT Head 11/8 with no acute process or ICH noted.  LP 11/9 cultures pending but NGTD. LP cell count inconclusive to due traumatic tap, but concerns for bacterial meningitis given her sinus history. Continue supportive care. Will resume all home meds. Continuing to avoid benzodiazepine medications to avoid further delirium. ID following, appreciate recs.

## 2018-11-13 PROBLEM — G93.41 ACUTE METABOLIC ENCEPHALOPATHY: Status: RESOLVED | Noted: 2018-01-01 | Resolved: 2018-01-01

## 2018-11-13 PROBLEM — A41.9 SEPSIS: Status: RESOLVED | Noted: 2018-01-01 | Resolved: 2018-01-01

## 2018-11-13 NOTE — PLAN OF CARE
Problem: Patient Care Overview  Goal: Plan of Care Review  - Patient is AAOx4, ambulates with 1 person assistance. Patient educated to use call bell for assistance, verbalized understanding. Patient's  and aunt at bedside, attentive to patient's needs.   - Afebrile, WBC 5.02 - continuing IV acyclovir and patient received 1 dose of vancomycin   - Telemetry monitoring SR 60's - 70's, K replaced with 20 meq PO   - CR trending down to 3.8 - urine output 850 cc so far this shift   - Patient with no appetite, did not eat breakfast or lunch   - Patient complained of pain twice so far this shift, PRN oxycodone admin per order   - Patient complained of anxiety once this AM - ativan administered per order   - Patient complained of nausea twice this shift, relieved with PRN zofran   - No skin breakdown noted, patient able to reposition self in bed independently   - See flow sheet for complete assessment details

## 2018-11-13 NOTE — SUBJECTIVE & OBJECTIVE
Subjective:     Interval History: No acute events overnight. Patient feels well this morning. She complains of mild, intermittent headache relieved with prn medications. She also complains of nausea and has little PO intake overnight. No other complaints today.     Continuous Infusions:    Scheduled Meds:   amLODIPine  10 mg Oral Daily    ARIPiprazole  2 mg Oral Daily    enoxaparin  30 mg Subcutaneous Daily    fluticasone  2 spray Each Nare Daily    fluticasone-vilanterol  1 puff Inhalation Daily    gabapentin  300 mg Oral QHS    lipase-protease-amylase 12,000-38,000-60,000 units  5 capsule Oral TID WM    metoprolol tartrate  25 mg Oral BID    morphine  15 mg Oral Q12H    mycophenolate  500 mg Oral BID    nystatin  500,000 Units Oral TID    pantoprazole  40 mg Oral Daily    potassium chloride  20 mEq Oral Once    predniSONE  5 mg Oral Daily    tacrolimus  0.5 mg Oral BID    topiramate  25 mg Oral BID    venlafaxine  150 mg Oral QHS     PRN Meds:acetaminophen, diphenhydrAMINE, diphenhydrAMINE, heparin, porcine (PF), levalbuterol, LORazepam, magnesium sulfate IVPB **AND** magnesium sulfate IVPB, naloxone, ondansetron, oxyCODONE, potassium chloride 10% **AND** potassium chloride 10% **AND** potassium chloride 10%, promethazine (PHENERGAN) IVPB, simethicone, tiZANidine    Review of patient's allergies indicates:   Allergen Reactions    Albuterol Palpitations    Colistin Anaphylaxis    Vancomycin analogues      Infusion reaction that does not resolve with slowing    Neupogen [filgrastim] Other (See Comments)     Ostealgia after five daily doses of 300 mcg.      Bactrim [sulfamethoxazole-trimethoprim] Hives    Ceftazidime Hives     Pt stated can tolerate cefapine not ceftazidime    Ceftazidime     Dronabinol Other (See Comments)     Mental changes/hallucinations    Haldol [haloperidol lactate] Other (See Comments)     Seizure like activity    Nsaids (non-steroidal anti-inflammatory drug)       Cannot have due to lung transplant    Adhesive Rash     Cloth tape- please use tegaderm or paper tape    Aztreonam Rash    Ciprofloxacin Nausea And Vomiting     Projectile N/V, per patient.  Unwilling to retry therapy.       Review of Systems   Constitutional: Negative for activity change, appetite change, chills, diaphoresis, fatigue and fever.   HENT: Negative for congestion, rhinorrhea, sore throat and trouble swallowing.    Eyes: Negative for discharge and redness.   Respiratory: Positive for cough. Negative for choking, shortness of breath, wheezing and stridor.    Cardiovascular: Negative for chest pain, palpitations and leg swelling.   Gastrointestinal: Positive for nausea. Negative for abdominal pain, constipation, diarrhea and vomiting.   Endocrine: Negative for polydipsia and polyuria.   Genitourinary: Negative for decreased urine volume, dysuria, flank pain and urgency.   Allergic/Immunologic: Positive for immunocompromised state.   Neurological: Positive for weakness. Negative for dizziness, seizures, speech difficulty, numbness and headaches.   Hematological: Bruises/bleeds easily.   Psychiatric/Behavioral: Negative for behavioral problems, confusion, decreased concentration, hallucinations and sleep disturbance.     Objective:   Physical Exam   Constitutional: She is oriented to person, place, and time. She appears well-developed and well-nourished.  Non-toxic appearance. She does not have a sickly appearance. She does not appear ill. No distress.   HENT:   Head: Atraumatic.   Right Ear: External ear normal.   Left Ear: External ear normal.   Nose: Nose normal.   Mouth/Throat: Oropharynx is clear and moist.   Eyes: Conjunctivae and EOM are normal. No scleral icterus.   Neck: Normal range of motion. Neck supple. No JVD present. No tracheal deviation present.   Cardiovascular: Regular rhythm, normal heart sounds and intact distal pulses. Tachycardia present. Exam reveals no gallop and no friction rub.    No murmur heard.  Pulmonary/Chest: Effort normal. No stridor. She has no decreased breath sounds. She has no wheezes. She has no rhonchi. She has no rales.   Abdominal: Soft. Bowel sounds are normal. She exhibits no distension. There is tenderness (minimal, diffuse).   Musculoskeletal: Normal range of motion. She exhibits no edema, tenderness or deformity.   Neurological: She is alert and oriented to person, place, and time. No cranial nerve deficit.   Skin: Skin is warm and dry. No rash noted. She is not diaphoretic. No erythema. No pallor.   Psychiatric: She has a normal mood and affect. Her behavior is normal. Judgment and thought content normal.   Nursing note and vitals reviewed.        Vital Signs (Most Recent):  Temp: 98.5 °F (36.9 °C) (11/13/18 1223)  Pulse: 67 (11/13/18 1223)  Resp: 16 (11/13/18 1223)  BP: (!) 164/90 (11/13/18 1223)  SpO2: (!) 93 % (11/13/18 1223) Vital Signs (24h Range):  Temp:  [97.7 °F (36.5 °C)-99.2 °F (37.3 °C)] 98.5 °F (36.9 °C)  Pulse:  [56-70] 67  Resp:  [16-20] 16  SpO2:  [93 %-97 %] 93 %  BP: (155-174)/(89-97) 164/90     Weight: 60.6 kg (133 lb 9.6 oz)  Body mass index is 25.24 kg/m².      Intake/Output Summary (Last 24 hours) at 11/13/2018 1338  Last data filed at 11/13/2018 1014  Gross per 24 hour   Intake 1470 ml   Output 2050 ml   Net -580 ml       Ventilator Data:          Hemodynamic Parameters:       Lines/Drains:       Port A Cath Single Lumen right subclavian (Active)   Dressing Type Transparent 11/8/2018 11:15 AM   Dressing Status Clean;Dry;Intact 11/8/2018 11:15 AM   Dressing Intervention Dressing reinforced 11/8/2018  3:05 AM   Dressing Change Due 10/06/18 10/4/2018  8:00 PM   Line Status Infusing 11/8/2018 11:15 AM   Flush Performed Yes 11/8/2018 11:15 AM   Date to be Reflushed 10/04/18 10/3/2018  8:00 PM   Daily Line Review Performed 11/8/2018 11:15 AM   Type of Needle Nicole 11/8/2018 11:15 AM   Gauge 20 11/8/2018 11:15 AM   Needle Length 3/4 in 10/1/2018  8:00 AM    Needle Status Left in place 11/8/2018 11:15 AM   Needle Insertion Date 09/29/18 10/1/2018  8:00 AM   Needle Insertion Time 1800 10/1/2018  8:00 AM   Needle Removal Date 09/29/18 9/29/2018 11:00 AM   Needle Removal Time 1136 9/29/2018 11:00 AM   Number of days:             Trialysis (Dialysis) Catheter 11/07/18 1023 right femoral (Active)   IV Device Securement sutures 11/8/2018 11:15 AM   Additional Site Signs no drainage;no streak formation;no palpable cord;no pain;no edema;no warmth;no erythema 11/8/2018 11:15 AM   Patency/Care flushed w/o difficulty 11/8/2018 11:15 AM   Site Assessment Clean;Dry;Intact;No redness;No swelling 11/8/2018 11:15 AM   Status Accessed 11/8/2018 11:15 AM   Flows Good 11/8/2018 11:15 AM   Dressing Intervention Dressing reinforced 11/8/2018  3:05 AM   Dressing Status Clean;Dry;Intact;Biopatch in place 11/8/2018 11:15 AM   Dressing Change Due 11/14/18 11/8/2018 11:15 AM   Verification by X-ray Yes 11/7/2018 10:30 AM   Site Condition No complications 11/8/2018 11:15 AM   Dressing Occlusive 11/8/2018 11:15 AM   Daily Line Review Performed 11/8/2018 11:15 AM   Number of days: 1            Peripheral IV - Single Lumen 11/07/18 0200 Right Hand (Active)   Site Assessment Clean;Dry;Intact;No redness;No swelling 11/8/2018 11:15 AM   Line Status Saline locked 11/8/2018 11:15 AM   Dressing Status Intact;Dry;Clean 11/8/2018 11:15 AM   Dressing Intervention New dressing 11/8/2018  3:05 AM   Dressing Change Due 11/11/18 11/8/2018  3:05 AM   Site Change Due 11/11/18 11/8/2018 11:15 AM   Reason Not Rotated Not due 11/8/2018 11:15 AM   Number of days: 1            Urethral Catheter 11/07/18 0300 (Active)   Site Assessment Clean;Intact 11/8/2018 11:15 AM   Collection Container Urimeter 11/8/2018 11:15 AM   Securement Method secured to top of thigh w/ adhesive device 11/8/2018 11:15 AM   Catheter Care Performed yes 11/8/2018 11:15 AM   Reason for Continuing Urinary Catheterization Critically ill in ICU  "requiring intensive monitoring 11/8/2018 11:15 AM   CAUTI Prevention Bundle StatLock in place w 1" slack;Intact seal between catheter & drainage tubing;Drainage bag off the floor;Green sheeting clip in use;No dependent loops or kinks;Drainage bag not overfilled (<2/3 full);Drainage bag below bladder 11/8/2018 11:15 AM   Output (mL) 5 mL 11/8/2018 12:00 PM   Number of days: 1       Significant Labs:  CBC:  Recent Labs   Lab 11/13/18 0314   WBC 4.88   RBC 2.92*   HGB 7.9*   HCT 26.5*   PLT 69*   MCV 91   MCH 27.1   MCHC 29.8*     BMP:  Recent Labs   Lab 11/13/18 0314      K 3.5      CO2 27   BUN 23*   CREATININE 3.7*   CALCIUM 7.9*      Tacrolimus Levels:  Recent Labs   Lab 11/13/18 0314   TACROLIMUS 1.9*     Microbiology:  Microbiology Results (last 7 days)     Procedure Component Value Units Date/Time    Urine culture [[id number]] Collected:  11/11/18 1636    Order Status:  Completed Specimen:  Urine Updated:  11/13/18 0943     Urine Culture, Routine No growth    Narrative:       Preferred Collection Type->Urine, Clean Catch    CSF culture [[id number]] Collected:  11/09/18 1657    Order Status:  Completed Specimen:  CSF (Spinal Fluid) from CSF Tap, Tube 3 Updated:  11/13/18 0707     CSF CULTURE No Growth to date     Gram Stain Result Rare WBC's      No organisms seen    Blood culture [[id number]] Collected:  11/07/18 0252    Order Status:  Completed Specimen:  Blood from Peripheral, Wrist, Right Updated:  11/12/18 0612     Blood Culture, Routine No growth after 5 days.    Blood culture [[id number]] Collected:  11/07/18 0200    Order Status:  Completed Specimen:  Blood from Peripheral, Antecubital, Right Updated:  11/12/18 0612     Blood Culture, Routine No growth after 5 days.    Cryptococcal antigen, CSF [[id number]] Collected:  11/09/18 1657    Order Status:  Completed Specimen:  CSF (Spinal Fluid) from CSF Tap, Tube 3 Updated:  11/10/18 1405     Crypto Ag, CSF Negative    Fungus culture [[id number]] " Collected:  11/09/18 1657    Order Status:  Sent Specimen:  CSF (Spinal Fluid) from CSF Tap, Tube 3 Updated:  11/09/18 1809    Gram stain [[id number]] Collected:  11/09/18 1657    Order Status:  Canceled Specimen:  CSF (Spinal Fluid) from CSF Tap, Tube 3 Updated:  11/09/18 1809    Cryptococcal antigen [[id number]] Collected:  11/08/18 1641    Order Status:  Completed Specimen:  Blood Updated:  11/09/18 1438     Cryptococcal Ag, Blood Negative    Fungus Culture, Blood or Bone Marrow [[id number]] Collected:  11/07/18 1715    Order Status:  Completed Specimen:  Blood Updated:  11/08/18 1323     Fungus Cult, blood or BM Culture in progress    Respiratory Viral Panel by PCR [facility]; Nasal Swab [[id number]] Collected:  11/07/18 0346    Order Status:  Completed Specimen:  Respiratory Updated:  11/08/18 1212     Respiratory Virus Panel, source Nasal Swab     RVP - Adenovirus Not Detected     Comment: Detects Serotypes B and E. Detection of Serotype C may   be limited. If Adenovirus infection is suspected and a   Not Detected result is returned the sample should be   re-tested for Adenovirus using an independent method  (e.g. [employer] Adenovirus Quantitative Real-Time  PCR test.          Enterovirus Not Detected     Comment: Cross-reactivity has been observed between certain Rhinovirus  strains and the Enterovirus assay.          Human Bocavirus Not Detected     Human Coronavirus Not Detected     Comment: The Human Coronavirus assay detects Human coronavirus types  229E, OC43,NL63 and HKU1.          RVP - Human Metapneumovirus (hMPV) Not Detected     RVP - Influenza A Not Detected     Influenza A - [id number] Not Detected     RVP - Influenza B Not Detected     Parainfluenza Not Detected     Respiratory Syncytial VirusVirus (RSV) A Not Detected     Comment: The Respiratory Syncytial Viral assay detects types A and B,  however it does not distinguish between the two.          RVP - Rhinovirus Not Detected     Comment:  Cross-Reactivity has been observed between certain   Rhinovirus strains and the Enterovirus assay.  Target Enriched Mulitplex Polymerase Chain Reaction (TEM-PCR)  allows for the detection of multiple pathogens out of a single  reaction.  This test was developed and its performance   characteristics determined by [employer].  It has not   been cleared or approved by the [place].Food and Drug Administration.  Results should be used in conjunction with clinical findings,   and should not form the sole basis for a diagnosis or treatment  decision.  TEM-PCR is a licensed technology of [employer].         Narrative:       Receiving Lab:->[facility]    Culture, Respiratory with Gram Stain [[id number]]     Order Status:  Canceled Specimen:  Respiratory from Sputum, Expectorated           I have reviewed all pertinent labs within the past 24 hours.    Diagnostic Results:  Chest X-Ray: I personally reviewed the films and findings are: no acute process

## 2018-11-13 NOTE — PROGRESS NOTES
[facility]  Lung Transplant  Progress Note - Floor    Patient Name: Juanita Ibarra  MRN: [id number]  Admission Date: 11/7/2018  Hospital Length of Stay: 6 days  Post-Operative Day: 1615  Attending Physician: Jake Alvarez MD  Primary Care Provider: Jake Alvarez MD     Subjective:     Interval History: No acute events overnight. Patient feels well this morning. She complains of mild, intermittent headache relieved with prn medications. She also complains of nausea and has little PO intake overnight. No other complaints today.     Continuous Infusions:    Scheduled Meds:   amLODIPine  10 mg Oral Daily    ARIPiprazole  2 mg Oral Daily    enoxaparin  30 mg Subcutaneous Daily    fluticasone  2 spray Each Nare Daily    fluticasone-vilanterol  1 puff Inhalation Daily    gabapentin  300 mg Oral QHS    lipase-protease-amylase 12,000-38,000-60,000 units  5 capsule Oral TID WM    metoprolol tartrate  25 mg Oral BID    morphine  15 mg Oral Q12H    mycophenolate  500 mg Oral BID    nystatin  500,000 Units Oral TID    pantoprazole  40 mg Oral Daily    potassium chloride  20 mEq Oral Once    predniSONE  5 mg Oral Daily    tacrolimus  0.5 mg Oral BID    topiramate  25 mg Oral BID    venlafaxine  150 mg Oral QHS     PRN Meds:acetaminophen, diphenhydrAMINE, diphenhydrAMINE, heparin, porcine (PF), levalbuterol, LORazepam, magnesium sulfate IVPB **AND** magnesium sulfate IVPB, naloxone, ondansetron, oxyCODONE, potassium chloride 10% **AND** potassium chloride 10% **AND** potassium chloride 10%, promethazine (PHENERGAN) IVPB, simethicone, tiZANidine    Review of patient's allergies indicates:   Allergen Reactions    Albuterol Palpitations    Colistin Anaphylaxis    Vancomycin analogues      Infusion reaction that does not resolve with slowing    Neupogen [filgrastim] Other (See Comments)     Ostealgia after five daily doses of 300 mcg.      Bactrim  [sulfamethoxazole-trimethoprim] Hives    Ceftazidime Hives     Pt stated can tolerate cefapine not ceftazidime    Ceftazidime     Dronabinol Other (See Comments)     Mental changes/hallucinations    Haldol [haloperidol lactate] Other (See Comments)     Seizure like activity    Nsaids (non-steroidal anti-inflammatory drug)      Cannot have due to lung transplant    Adhesive Rash     Cloth tape- please use tegaderm or paper tape    Aztreonam Rash    Ciprofloxacin Nausea And Vomiting     Projectile N/V, per patient.  Unwilling to retry therapy.       Review of Systems   Constitutional: Negative for activity change, appetite change, chills, diaphoresis, fatigue and fever.   HENT: Negative for congestion, rhinorrhea, sore throat and trouble swallowing.    Eyes: Negative for discharge and redness.   Respiratory: Positive for cough. Negative for choking, shortness of breath, wheezing and stridor.    Cardiovascular: Negative for chest pain, palpitations and leg swelling.   Gastrointestinal: Positive for nausea. Negative for abdominal pain, constipation, diarrhea and vomiting.   Endocrine: Negative for polydipsia and polyuria.   Genitourinary: Negative for decreased urine volume, dysuria, flank pain and urgency.   Allergic/Immunologic: Positive for immunocompromised state.   Neurological: Positive for weakness. Negative for dizziness, seizures, speech difficulty, numbness and headaches.   Hematological: Bruises/bleeds easily.   Psychiatric/Behavioral: Negative for behavioral problems, confusion, decreased concentration, hallucinations and sleep disturbance.     Objective:   Physical Exam   Constitutional: She is oriented to person, place, and time. She appears well-developed and well-nourished.  Non-toxic appearance. She does not have a sickly appearance. She does not appear ill. No distress.   HENT:   Head: Atraumatic.   Right Ear: External ear normal.   Left Ear: External ear normal.   Nose: Nose normal.    Mouth/Throat: Oropharynx is clear and moist.   Eyes: Conjunctivae and EOM are normal. No scleral icterus.   Neck: Normal range of motion. Neck supple. No JVD present. No tracheal deviation present.   Cardiovascular: Regular rhythm, normal heart sounds and intact distal pulses. Tachycardia present. Exam reveals no gallop and no friction rub.   No murmur heard.  Pulmonary/Chest: Effort normal. No stridor. She has no decreased breath sounds. She has no wheezes. She has no rhonchi. She has no rales.   Abdominal: Soft. Bowel sounds are normal. She exhibits no distension. There is tenderness (minimal, diffuse).   Musculoskeletal: Normal range of motion. She exhibits no edema, tenderness or deformity.   Neurological: She is alert and oriented to person, place, and time. No cranial nerve deficit.   Skin: Skin is warm and dry. No rash noted. She is not diaphoretic. No erythema. No pallor.   Psychiatric: She has a normal mood and affect. Her behavior is normal. Judgment and thought content normal.   Nursing note and vitals reviewed.        Vital Signs (Most Recent):  Temp: 98.5 °F (36.9 °C) (11/13/18 1223)  Pulse: 67 (11/13/18 1223)  Resp: 16 (11/13/18 1223)  BP: (!) 164/90 (11/13/18 1223)  SpO2: (!) 93 % (11/13/18 1223) Vital Signs (24h Range):  Temp:  [97.7 °F (36.5 °C)-99.2 °F (37.3 °C)] 98.5 °F (36.9 °C)  Pulse:  [56-70] 67  Resp:  [16-20] 16  SpO2:  [93 %-97 %] 93 %  BP: (155-174)/(89-97) 164/90     Weight: 60.6 kg (133 lb 9.6 oz)  Body mass index is 25.24 kg/m².      Intake/Output Summary (Last 24 hours) at 11/13/2018 1338  Last data filed at 11/13/2018 1014  Gross per 24 hour   Intake 1470 ml   Output 2050 ml   Net -580 ml       Ventilator Data:          Hemodynamic Parameters:       Lines/Drains:       Port A Cath Single Lumen right subclavian (Active)   Dressing Type Transparent 11/8/2018 11:15 AM   Dressing Status Clean;Dry;Intact 11/8/2018 11:15 AM   Dressing Intervention Dressing reinforced 11/8/2018  3:05 AM    Dressing Change Due 10/06/18 10/4/2018  8:00 PM   Line Status Infusing 11/8/2018 11:15 AM   Flush Performed Yes 11/8/2018 11:15 AM   Date to be Reflushed 10/04/18 10/3/2018  8:00 PM   Daily Line Review Performed 11/8/2018 11:15 AM   Type of Needle Nicole 11/8/2018 11:15 AM   Gauge 20 11/8/2018 11:15 AM   Needle Length 3/4 in 10/1/2018  8:00 AM   Needle Status Left in place 11/8/2018 11:15 AM   Needle Insertion Date 09/29/18 10/1/2018  8:00 AM   Needle Insertion Time 1800 10/1/2018  8:00 AM   Needle Removal Date 09/29/18 9/29/2018 11:00 AM   Needle Removal Time 1136 9/29/2018 11:00 AM   Number of days:             Trialysis (Dialysis) Catheter 11/07/18 1023 right femoral (Active)   IV Device Securement sutures 11/8/2018 11:15 AM   Additional Site Signs no drainage;no streak formation;no palpable cord;no pain;no edema;no warmth;no erythema 11/8/2018 11:15 AM   Patency/Care flushed w/o difficulty 11/8/2018 11:15 AM   Site Assessment Clean;Dry;Intact;No redness;No swelling 11/8/2018 11:15 AM   Status Accessed 11/8/2018 11:15 AM   Flows Good 11/8/2018 11:15 AM   Dressing Intervention Dressing reinforced 11/8/2018  3:05 AM   Dressing Status Clean;Dry;Intact;Biopatch in place 11/8/2018 11:15 AM   Dressing Change Due 11/14/18 11/8/2018 11:15 AM   Verification by X-ray Yes 11/7/2018 10:30 AM   Site Condition No complications 11/8/2018 11:15 AM   Dressing Occlusive 11/8/2018 11:15 AM   Daily Line Review Performed 11/8/2018 11:15 AM   Number of days: 1            Peripheral IV - Single Lumen 11/07/18 0200 Right Hand (Active)   Site Assessment Clean;Dry;Intact;No redness;No swelling 11/8/2018 11:15 AM   Line Status Saline locked 11/8/2018 11:15 AM   Dressing Status Intact;Dry;Clean 11/8/2018 11:15 AM   Dressing Intervention New dressing 11/8/2018  3:05 AM   Dressing Change Due 11/11/18 11/8/2018  3:05 AM   Site Change Due 11/11/18 11/8/2018 11:15 AM   Reason Not Rotated Not due 11/8/2018 11:15 AM   Number of days: 1             "Urethral Catheter 11/07/18 0300 (Active)   Site Assessment Clean;Intact 11/8/2018 11:15 AM   Collection Container Urimeter 11/8/2018 11:15 AM   Securement Method secured to top of thigh w/ adhesive device 11/8/2018 11:15 AM   Catheter Care Performed yes 11/8/2018 11:15 AM   Reason for Continuing Urinary Catheterization Critically ill in ICU requiring intensive monitoring 11/8/2018 11:15 AM   CAUTI Prevention Bundle StatLock in place w 1" slack;Intact seal between catheter & drainage tubing;Drainage bag off the floor;Green sheeting clip in use;No dependent loops or kinks;Drainage bag not overfilled (<2/3 full);Drainage bag below bladder 11/8/2018 11:15 AM   Output (mL) 5 mL 11/8/2018 12:00 PM   Number of days: 1       Significant Labs:  CBC:  Recent Labs   Lab 11/13/18 0314   WBC 4.88   RBC 2.92*   HGB 7.9*   HCT 26.5*   PLT 69*   MCV 91   MCH 27.1   MCHC 29.8*     BMP:  Recent Labs   Lab 11/13/18 0314      K 3.5      CO2 27   BUN 23*   CREATININE 3.7*   CALCIUM 7.9*      Tacrolimus Levels:  Recent Labs   Lab 11/13/18 0314   TACROLIMUS 1.9*     Microbiology:  Microbiology Results (last 7 days)     Procedure Component Value Units Date/Time    Urine culture [[id number]] Collected:  11/11/18 1636    Order Status:  Completed Specimen:  Urine Updated:  11/13/18 0943     Urine Culture, Routine No growth    Narrative:       Preferred Collection Type->Urine, Clean Catch    CSF culture [[id number]] Collected:  11/09/18 1657    Order Status:  Completed Specimen:  CSF (Spinal Fluid) from CSF Tap, Tube 3 Updated:  11/13/18 0707     CSF CULTURE No Growth to date     Gram Stain Result Rare WBC's      No organisms seen    Blood culture [[id number]] Collected:  11/07/18 0252    Order Status:  Completed Specimen:  Blood from Peripheral, Wrist, Right Updated:  11/12/18 0612     Blood Culture, Routine No growth after 5 days.    Blood culture [[id number]] Collected:  11/07/18 0200    Order Status:  Completed Specimen:  Blood " from Peripheral, Antecubital, Right Updated:  11/12/18 0612     Blood Culture, Routine No growth after 5 days.    Cryptococcal antigen, CSF [[id number]] Collected:  11/09/18 1657    Order Status:  Completed Specimen:  CSF (Spinal Fluid) from CSF Tap, Tube 3 Updated:  11/10/18 1405     Crypto Ag, CSF Negative    Fungus culture [[id number]] Collected:  11/09/18 1657    Order Status:  Sent Specimen:  CSF (Spinal Fluid) from CSF Tap, Tube 3 Updated:  11/09/18 1809    Gram stain [[id number]] Collected:  11/09/18 1657    Order Status:  Canceled Specimen:  CSF (Spinal Fluid) from CSF Tap, Tube 3 Updated:  11/09/18 1809    Cryptococcal antigen [[id number]] Collected:  11/08/18 1641    Order Status:  Completed Specimen:  Blood Updated:  11/09/18 1438     Cryptococcal Ag, Blood Negative    Fungus Culture, Blood or Bone Marrow [[id number]] Collected:  11/07/18 1715    Order Status:  Completed Specimen:  Blood Updated:  11/08/18 1323     Fungus Cult, blood or BM Culture in progress    Respiratory Viral Panel by PCR [facility]; Nasal Swab [[id number]] Collected:  11/07/18 0346    Order Status:  Completed Specimen:  Respiratory Updated:  11/08/18 1212     Respiratory Virus Panel, source Nasal Swab     RVP - Adenovirus Not Detected     Comment: Detects Serotypes B and E. Detection of Serotype C may   be limited. If Adenovirus infection is suspected and a   Not Detected result is returned the sample should be   re-tested for Adenovirus using an independent method  (e.g. [employer] Adenovirus Quantitative Real-Time  PCR test.          Enterovirus Not Detected     Comment: Cross-reactivity has been observed between certain Rhinovirus  strains and the Enterovirus assay.          Human Bocavirus Not Detected     Human Coronavirus Not Detected     Comment: The Human Coronavirus assay detects Human coronavirus types  229E, OC43,NL63 and HKU1.          RVP - Human Metapneumovirus (hMPV) Not Detected     RVP - Influenza A Not Detected      Influenza A - F2I3-47 Not Detected     RVP - Influenza B Not Detected     Parainfluenza Not Detected     Respiratory Syncytial VirusVirus (RSV) A Not Detected     Comment: The Respiratory Syncytial Viral assay detects types A and B,  however it does not distinguish between the two.          RVP - Rhinovirus Not Detected     Comment: Cross-Reactivity has been observed between certain   Rhinovirus strains and the Enterovirus assay.  Target Enriched Mulitplex Polymerase Chain Reaction (TEM-PCR)  allows for the detection of multiple pathogens out of a single  reaction.  This test was developed and its performance   characteristics determined by [employer].  It has not   been cleared or approved by the U.S.Food and Drug Administration.  Results should be used in conjunction with clinical findings,   and should not form the sole basis for a diagnosis or treatment  decision.  TEM-PCR is a licensed technology of [employer].         Narrative:       Receiving Lab:->[facility]    Culture, Respiratory with Gram Stain [[id number]]     Order Status:  Canceled Specimen:  Respiratory from Sputum, Expectorated           I have reviewed all pertinent labs within the past 24 hours.    Diagnostic Results:  Chest X-Ray: I personally reviewed the films and findings are: no acute process      Assessment/Plan:     * Acute kidney failure    Likely multifactorial 2/2 poor PO intake from acute illness coupled with nephrotoxic medications. Initially received 4L LR with bicarb gtt with minimal UOP. Received SLED 11/7 and discontinued 11/8 due to resolution of acidosis and hyperkalemia.     Creatinine stable at 3.7. Stable for discharge with no need for follow up with nephrology on outpatient basis unless her creatinine rises. Plan for discharge home tomorrow.      Lung transplant status, bilateral    Underwent BLT for CF on 6/12/2014. Transplant history complicated by A1 rejection in 8/2014, recurrent candida glabrata  positive sputum, MDR pseudomonas and MRSA, CMV viremia 7/2015, and [facility] EDUARDO.     Tacrolimus resumed 11/12. Continue immunosuppression and ppx.      Prophylactic antibiotic    Continue dapsone 100 mg daily.     Immunosuppression    Current outpatient regimen is tacrolimus 3.5 mg BID, prednisone 5 mg daily, and  mg BID.    Tacrolimus resumed at 0.5 mg BID on 11/12. Continue prednisone 5 mg daily and  mg BID. Will continue to monitor tacrolimus levels and adjust dose as needed.      Bronchiolitis obliterans syndrome    FEV1 with persistent decline on outpatient PFTs. Currently on 2L NC. Her acute respiratory failure likely secondary to opioid overdose + acute renal failure. [place] narcan x 2 [place] [facility] [place] narcan gtt with minimal improvement in mental status. Mental status has improved following round of CRRT and sodium bicarb gtt.     Mental status currently at baseline. Encouraged patient to begin to ambulate as tolerated. Maintain O2 sats >88%.     Chronic pain with opiate use    Opioid overdose + acute renal failure from poor PO intake likely cause for metabolic encephalopathy.     Has resumed home opioid regimen with appropriate renal function and mental status.      CF related Pancreatic insufficiency    Continue Creon with meals and snacks.          Shama Canales PA-C  Lung Transplant  [facility]

## 2018-11-13 NOTE — PHYSICIAN QUERY
PT Name: Juanita Ibarra  MR #: [id number]     Physician Query Form - Documentation Clarification      CDS/: Krupa Fish               Contact information: [email or web address]     This form is a permanent document in the medical record.     Query Date: November 13, 2018    By submitting this query, we are merely seeking further clarification of documentation. Please utilize your independent clinical judgment when addressing the question(s) below.    The Medical record reflects the following:    Supporting Clinical Findings Location in Medical Record   Bronchiolitis obliterans syndrome  FEV1 with persistent decline on outpatient PFTs. Currently on 2L HFNC. Her acute respiratory failure likely secondary to opoid overdose + acute renal failure. Received narcan x 2 en route to [facility] and was placed on narcan gtt with minimal improvement in mental status. Wean oxygen to maintain O2 sats >92%.     Intermittently requires 2L NC. Maintain O2 sats >88%.  Transplant Lung PN 11/8              Transplant Lung PN 11/8                                                                                Doctor, Please specify diagnosis or diagnoses associated with above clinical findings.    Provider Use Only      (x  ) Bronchiolitis obliterans syndrome complicating function of transplanted lung     (  ) Bronchiolitis obliterans syndrome complicating function of transplanted lung     (  ) Other_____________________                                                                                                            Clinically Undetermined

## 2018-11-13 NOTE — PHYSICIAN QUERY
"PT Name: Juanita Ibarra  MR #: [id number]    Physician Query Form - Hematology Clarification      CDS/: Krupa Fish               Contact information: [email or web address]     This form is a permanent document in the medical record.      Query Date: November 13, 2018    By submitting this query, we are merely seeking further clarification of documentation. Please utilize your independent clinical judgment when addressing the question(s) below.    The Medical record contains the following:   Indicators  Supporting Clinical Findings Location in Medical Record    "Anemia" documented     X H & H = Hemoglobin 8.6- 6.4- 7.9  Hematocrit 29.6- 21.6-  26.5 Labs 11/7- 11/13    BP =                     HR=      "GI bleeding" documented      Acute bleeding (Non GI site)     X Transfusion(s) 1 unit PRBC   Transfusion Record    X Treatment:  Transfuse 1u RBC today for Hgb 6.4 Lung transplant PN 11/9    Other:        Provider, please specify diagnosis or diagnoses associated with above clinical findings.     Acute blood loss anemia expected post-operatively    Acute blood loss anemia    Aplastic anemia    Iron deficiency anemia    Chronic blood loss anemia    Pernicious anemia    Precipitous drop in Hematocrit     x Anemia of chronic disease ( Specify chronic disease)        CKD (specify stage):    Neoplastic disease     Due to Chemotherapy     Other (Specify):    Clinically Undetermined        Other Hematological Diagnosis (please specify):      Clinically Undetermined       Please document in your progress notes daily for the duration of treatment, until resolved, and include in your discharge summary.                                                                                                      "

## 2018-11-13 NOTE — PROGRESS NOTES
Femoral line removed per MD order.  Applied pressure to R groin for 30 minutes.  Applied occlusive Vaseline gauze dressing.  Instructed patient to lie flat another hour, and then can sit up.  Also, instructed patient to keep dressing on and dry for 24 hours.  Will continue to monitor patient.

## 2018-11-13 NOTE — ASSESSMENT & PLAN NOTE
FEV1 with persistent decline on outpatient PFTs. Currently on 2L NC. Her acute respiratory failure likely secondary to opioid overdose + acute renal failure. Received narcan x 2 en [place] [facility] and was [place] on narcan gtt with minimal improvement in mental status. Mental status has improved following round of CRRT and sodium bicarb gtt.     Mental status currently at baseline. Encouraged patient to begin to ambulate as tolerated. Maintain O2 sats >88%.

## 2018-11-13 NOTE — SUBJECTIVE & OBJECTIVE
Interval History: NAEON. Patient still progressing well. Mentation back to baseline per spouse. SCr. 3.7 today, still above baseline. Urine output 1.9 L/24 hr. Last SLED treatment, 11/7.     Review of patient's allergies indicates:   Allergen Reactions    Albuterol Palpitations    Colistin Anaphylaxis    Vancomycin analogues      Infusion reaction that does not resolve with slowing    Neupogen [filgrastim] Other (See Comments)     Ostealgia after five daily doses of 300 mcg.      Bactrim [sulfamethoxazole-trimethoprim] Hives    Ceftazidime Hives     Pt stated can tolerate cefapine not ceftazidime    Ceftazidime     Dronabinol Other (See Comments)     Mental changes/hallucinations    Haldol [haloperidol lactate] Other (See Comments)     Seizure like activity    Nsaids (non-steroidal anti-inflammatory drug)      Cannot have due to lung transplant    Adhesive Rash     Cloth tape- please use tegaderm or paper tape    Aztreonam Rash    Ciprofloxacin Nausea And Vomiting     Projectile N/V, per patient.  Unwilling to retry therapy.     Current Facility-Administered Medications   Medication Frequency    acetaminophen tablet 1,000 mg Q6H PRN    acyclovir (ZOVIRAX) 290 mg in dextrose 5 % 100 mL IVPB Daily    amLODIPine tablet 10 mg Daily    ARIPiprazole tablet 2 mg Daily    ceFEPIme injection 1 g Daily    diphenhydrAMINE capsule 25 mg Q6H PRN    diphenhydrAMINE injection 50 mg Q6H PRN    enoxaparin injection 30 mg Daily    fluticasone 50 mcg/actuation nasal spray 100 mcg Daily    fluticasone-vilanterol 100-25 mcg/dose diskus inhaler 1 puff Daily    gabapentin capsule 300 mg QHS    heparin, porcine (PF) 100 unit/mL injection flush 100 Units PRN    levalbuterol nebulizer solution 1.25 mg Q8H PRN    lipase-protease-amylase 12,000-38,000-60,000 units per capsule 5 capsule TID WM    LORazepam tablet 2 mg Q6H PRN    magnesium sulfate 2g in water 50mL IVPB (premix) PRN    And    magnesium sulfate 2g in  water 50mL IVPB (premix) PRN    metoprolol tartrate (LOPRESSOR) tablet 25 mg BID    morphine 12 hr tablet 15 mg Q12H    mycophenolate capsule 500 mg BID    naloxone 0.4 mg/mL injection 0.4 mg PRN    nystatin 100,000 unit/mL suspension 500,000 Units TID    ondansetron injection 8 mg Q6H PRN    oxyCODONE immediate release tablet Tab 10 mg Q4H PRN    pantoprazole EC tablet 40 mg Daily    potassium chloride 10% oral solution 40 mEq PRN    And    potassium chloride 10% oral solution 40 mEq PRN    And    potassium chloride 10% oral solution 60 mEq PRN    potassium chloride SA CR tablet 20 mEq Once    predniSONE tablet 5 mg Daily    promethazine (PHENERGAN) 25 mg in dextrose 5 % 50 mL IVPB Q6H PRN    simethicone chewable tablet 80 mg TID PRN    tacrolimus capsule 0.5 mg BID    tiZANidine tablet 8 mg Q6H PRN    topiramate tablet 25 mg BID    venlafaxine 24 hr capsule 150 mg QHS       Objective:     Vital Signs (Most Recent):  Temp: 98.3 °F (36.8 °C) (11/13/18 1151)  Pulse: 70 (11/13/18 1133)  Resp: 20 (11/13/18 0939)  BP: (!) 171/93 (11/13/18 0815)  SpO2: (!) 93 % (11/13/18 0815)  O2 Device (Oxygen Therapy): room air (11/13/18 0815) Vital Signs (24h Range):  Temp:  [97.7 °F (36.5 °C)-99.2 °F (37.3 °C)] 98.3 °F (36.8 °C)  Pulse:  [56-77] 70  Resp:  [18-20] 20  SpO2:  [93 %-97 %] 93 %  BP: (155-174)/(89-97) 171/93     Weight: 60.6 kg (133 lb 9.6 oz) (11/13/18 0326)  Body mass index is 25.24 kg/m².  Body surface area is 1.61 meters squared.    I/O last 3 completed shifts:  In: 1610 [P.O.:1260; IV Piggyback:350]  Out: 2725 [Urine:2725]    Physical Exam   Constitutional: She is oriented to person, place, and time. She appears well-developed. She is cooperative. She does not have a sickly appearance. No distress.   HENT:   Head: Normocephalic and atraumatic.   Mouth/Throat: No oropharyngeal exudate.   Eyes: Right eye exhibits no discharge. Left eye exhibits no discharge.   Neck: Normal range of motion. Neck  supple. No JVD present.   Cardiovascular: Normal rate, regular rhythm, normal heart sounds and intact distal pulses.   No murmur heard.  Pulmonary/Chest: Effort normal and breath sounds normal. No respiratory distress. She has no wheezes. She has no rhonchi. She has no rales.   Abdominal: Soft. Bowel sounds are normal. She exhibits no distension. There is no tenderness. There is no guarding.   Musculoskeletal: Normal range of motion. She exhibits no edema.   Neurological: She is alert and oriented to person, place, and time.   Skin: Skin is warm and dry. No rash noted. She is not diaphoretic. No erythema. There is pallor.   Psychiatric: She has a normal mood and affect. Her speech is normal and behavior is normal. Her mood appears not anxious. She does not exhibit a depressed mood.   Nursing note and vitals reviewed.      Significant Labs:  CBC:   Recent Labs   Lab 11/13/18 0314   WBC 4.88   RBC 2.92*   HGB 7.9*   HCT 26.5*   PLT 69*   MCV 91   MCH 27.1   MCHC 29.8*     CMP:   Recent Labs   Lab 11/13/18 0314   *   CALCIUM 7.9*   ALBUMIN 2.7*   PROT 5.1*      K 3.5   CO2 27      BUN 23*   CREATININE 3.7*   ALKPHOS 164*   ALT 35   *   BILITOT 0.3

## 2018-11-13 NOTE — ASSESSMENT & PLAN NOTE
Likely multifactorial 2/2 poor PO intake from acute illness coupled with nephrotoxic medications. Initially received 4L LR with bicarb gtt with minimal UOP. Received SLED 11/7 and discontinued 11/8 due to resolution of acidosis and hyperkalemia.     Creatinine stable at 3.7. Stable for discharge with no need for follow up with nephrology on outpatient basis unless her creatinine rises. Plan for discharge home tomorrow.

## 2018-11-13 NOTE — ASSESSMENT & PLAN NOTE
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     Tacrolimus resumed 11/12. Continue immunosuppression and ppx.

## 2018-11-13 NOTE — PROGRESS NOTES
[facility]  Nephrology  Progress Note    Patient Name: Juanita Ibarra  MRN: [id number]  Admission Date: 11/7/2018  Hospital Length of Stay: 6 days  Attending Provider: Jake Alvarez MD   Primary Care Physician: Jake Alvarez MD  Principal Problem:Acute metabolic encephalopathy    Subjective:     HPI: The patient is a  28 year-old  female with a medical history of BLT secondary to CF. 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 Patient presents as a transfer from [facility] in [place] on 11/7 after presenting there with a 3 day history of lethargy and dizziness per spouse. The patient has a history of multiple hospitalizations 2/2 PNA and/or dyspnea over the last couple of months. The patient was somnolent during assessment, therefore, majority of HPI is from her spouse and records. The spouse states that the patient had been feeling nauseated for the last 3 days. He states that she also stopped eating during that time. Despite her decreased appetite, the spouse continued to give her all of her prescribed medications medications including her Morphine as normal. On admit to the OSH, her intial BP was 83/52, , and SPO2 was 95% on ?room air. According to the records, she also had a ?LISBETH and was hyperkalemic. She was given calcium gluconate, insulin, D50, and Kayexalate at the OSH. The patient was also given a total of 4 L of ?IVFs for her BP and was started on Zosyn. She was then transferred to [facility] via flight care team overnight for an higher level of care. She was started on a bicarb gtt 2/2 severe metabolic acidosis and narcan drip 2/2 hypercapnic respiratory failure. ABG on admit was 6.9/59 and her bicarb was 12. ABG has improved to 7.2/48.6 and bicarb to 21.8 since being on bicarb drip. Patient on 2 L via  "NC.  The patient was hyperkalemic, 6.5, on admit, and was shifted. Potasium 5.1 after medications. Nephrology consulted for LISBETH and severe metabolic acidosis. The patient's spouse denies a history of kidney issues. Her baseline SCr from 2015 to 2018 has been around ~ 1.0. Her last appointment visits in October revealed a SCr of 1.5 and 1.8. The patient's  did state that a couple of months ago the patient was found to have a LISBETH during a outpatient appointment in [place]. He states that she was sent to ED and was given fluids and discharged home. He denied any issues around that time leading to her LISBETH and states it was "randomly" found on labs. The patient was admitted with a SCr of 6.1. Urine output has been between 10-30 ml/hr. She put out a total of 300 ml during night shift.     Nephrology consulted for management of LISBETH and metabolic acidosis.         Interval History: NAEON. Patient still progressing well. Mentation back to baseline per spouse. SCr. 3.7 today, still above baseline. Urine output 1.9 L/24 hr. Last SLED treatment, 11/7.     Review of patient's allergies indicates:   Allergen Reactions    Albuterol Palpitations    Colistin Anaphylaxis    Vancomycin analogues      Infusion reaction that does not resolve with slowing    Neupogen [filgrastim] Other (See Comments)     Ostealgia after five daily doses of 300 mcg.      Bactrim [sulfamethoxazole-trimethoprim] Hives    Ceftazidime Hives     Pt stated can tolerate cefapine not ceftazidime    Ceftazidime     Dronabinol Other (See Comments)     Mental changes/hallucinations    Haldol [haloperidol lactate] Other (See Comments)     Seizure like activity    Nsaids (non-steroidal anti-inflammatory drug)      Cannot have due to lung transplant    Adhesive Rash     Cloth tape- please use tegaderm or paper tape    Aztreonam Rash    Ciprofloxacin Nausea And Vomiting     Projectile N/V, per patient.  Unwilling to retry therapy.     Current " Facility-Administered Medications   Medication Frequency    acetaminophen tablet 1,000 mg Q6H PRN    acyclovir (ZOVIRAX) 290 mg in dextrose 5 % 100 mL IVPB Daily    amLODIPine tablet 10 mg Daily    ARIPiprazole tablet 2 mg Daily    ceFEPIme injection 1 g Daily    diphenhydrAMINE capsule 25 mg Q6H PRN    diphenhydrAMINE injection 50 mg Q6H PRN    enoxaparin injection 30 mg Daily    fluticasone 50 mcg/actuation nasal spray 100 mcg Daily    fluticasone-vilanterol 100-25 mcg/dose diskus inhaler 1 puff Daily    gabapentin capsule 300 mg QHS    heparin, porcine (PF) 100 unit/mL injection flush 100 Units PRN    levalbuterol nebulizer solution 1.25 mg Q8H PRN    lipase-protease-amylase 12,000-38,000-60,000 units per capsule 5 capsule TID WM    LORazepam tablet 2 mg Q6H PRN    magnesium sulfate 2g in water 50mL IVPB (premix) PRN    And    magnesium sulfate 2g in water 50mL IVPB (premix) PRN    metoprolol tartrate (LOPRESSOR) tablet 25 mg BID    morphine 12 hr tablet 15 mg Q12H    mycophenolate capsule 500 mg BID    naloxone 0.4 mg/mL injection 0.4 mg PRN    nystatin 100,000 unit/mL suspension 500,000 Units TID    ondansetron injection 8 mg Q6H PRN    oxyCODONE immediate release tablet Tab 10 mg Q4H PRN    pantoprazole EC tablet 40 mg Daily    potassium chloride 10% oral solution 40 mEq PRN    And    potassium chloride 10% oral solution 40 mEq PRN    And    potassium chloride 10% oral solution 60 mEq PRN    potassium chloride SA CR tablet 20 mEq Once    predniSONE tablet 5 mg Daily    promethazine (PHENERGAN) 25 mg in dextrose 5 % 50 mL IVPB Q6H PRN    simethicone chewable tablet 80 mg TID PRN    tacrolimus capsule 0.5 mg BID    tiZANidine tablet 8 mg Q6H PRN    topiramate tablet 25 mg BID    venlafaxine 24 hr capsule 150 mg QHS       Objective:     Vital Signs (Most Recent):  Temp: 98.3 °F (36.8 °C) (11/13/18 1151)  Pulse: 70 (11/13/18 1133)  Resp: 20 (11/13/18 0939)  BP: (!) 171/93  (11/13/18 0815)  SpO2: (!) 93 % (11/13/18 0815)  O2 Device (Oxygen Therapy): room air (11/13/18 0815) Vital Signs (24h Range):  Temp:  [97.7 °F (36.5 °C)-99.2 °F (37.3 °C)] 98.3 °F (36.8 °C)  Pulse:  [56-77] 70  Resp:  [18-20] 20  SpO2:  [93 %-97 %] 93 %  BP: (155-174)/(89-97) 171/93     Weight: 60.6 kg (133 lb 9.6 oz) (11/13/18 0326)  Body mass index is 25.24 kg/m².  Body surface area is 1.61 meters squared.    I/O last 3 completed shifts:  In: 1610 [P.O.:1260; IV Piggyback:350]  Out: 2725 [Urine:2725]    Physical Exam   Constitutional: She is oriented to person, place, and time. She appears well-developed. She is cooperative. She does not have a sickly appearance. No distress.   HENT:   Head: Normocephalic and atraumatic.   Mouth/Throat: No oropharyngeal exudate.   Eyes: Right eye exhibits no discharge. Left eye exhibits no discharge.   Neck: Normal range of motion. Neck supple. No JVD present.   Cardiovascular: Normal rate, regular rhythm, normal heart sounds and intact distal pulses.   No murmur heard.  Pulmonary/Chest: Effort normal and breath sounds normal. No respiratory distress. She has no wheezes. She has no rhonchi. She has no rales.   Abdominal: Soft. Bowel sounds are normal. She exhibits no distension. There is no tenderness. There is no guarding.   Musculoskeletal: Normal range of motion. She exhibits no edema.   Neurological: She is alert and oriented to person, place, and time.   Skin: Skin is warm and dry. No rash noted. She is not diaphoretic. No erythema. There is pallor.   Psychiatric: She has a normal mood and affect. Her speech is normal and behavior is normal. Her mood appears not anxious. She does not exhibit a depressed mood.   Nursing note and vitals reviewed.      Significant Labs:  CBC:   Recent Labs   Lab 11/13/18 0314   WBC 4.88   RBC 2.92*   HGB 7.9*   HCT 26.5*   PLT 69*   MCV 91   MCH 27.1   MCHC 29.8*     CMP:   Recent Labs   Lab 11/13/18  0314   *   CALCIUM 7.9*   ALBUMIN  2.7*   PROT 5.1*      K 3.5   CO2 27      BUN 23*   CREATININE 3.7*   ALKPHOS 164*   ALT 35   *   BILITOT 0.3            Assessment/Plan:     Acute kidney failure    The patient is a  28 year-old  female with medical history of BLT secondary to CF. 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 Patient presents as a transfer from [facility] in [place] on 11/7 after presenting there with a 3 day history of lethargy and dizziness per spouse. She was transferred to [facility] via flight care team overnight. She was started on a bicarb gtt 2/2 severe metabolic acidosis and narcan drip 2/2 hypercapnic respiratory failure. ABG on admit was 6.9/59 and her bicarb was 12. ABG improved to 7.2/48.6 and bicarb to 21.8 since being on bicarb drip. Patient on 2 L via NC. The patient was hyperkalemic, 6.5, on admit, and was shifted. Potasium 5.1 after medications. Nephrology consulted for LISBETH and severe metabolic acidosis. The patient's spouse denies a history of kidney issues. Her baseline SCr from 2015 to 2018 has been around ~ 1.0. Her appointment visits in October revealed a SCr of 1.5 and 1.8. The patient was admitted with a SCr of 6.1. Urine outputt has been between 10-30 ml/hr. She put out a total of 300 ml during night shift. UA today with 2+ protein. UPCR pending. LISBETH secondary to ATN likely caused by hypotension/sepsis in the setting of severe metabolic acidosis.     Plan:   No need for dialysis. Okay to remove femoral CL.   Patient with adequate urine output, 1.9 L/24 hr.   SCr trending down to 3.7 today, but still above baseline.   Urinary catheter removed on 11/10  Renal US completed on 11/7/18, showing reduced perfusion bilaterally   Urine creatinine/protein ratio was 0.51  Avoid NSAIDs, contrast, nephrotoxins   Monitor strict I/Os   Daily  weights  Renally dose medications to current GFR     Case discussed with staff further recs with attestation.     I will follow-up with patient. Please contact us if you have any additional questions.    GUSTABO Lunsford DNP, APRN, FNP-C  Department of Nephrology  [facility]  Pager: [phone]

## 2018-11-13 NOTE — ASSESSMENT & PLAN NOTE
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 Patient presents as a transfer from [facility] in [place] on 11/7 after presenting there with a 3 day history of lethargy and dizziness per spouse. She was transferred to [facility] via flight care team overnight. She was started on a bicarb gtt 2/2 severe metabolic acidosis and narcan drip 2/2 hypercapnic respiratory failure. ABG on admit was 6.9/59 and her bicarb was 12. ABG improved to 7.2/48.6 and bicarb to 21.8 since being on bicarb drip. Patient on 2 L via NC. The patient was hyperkalemic, 6.5, on admit, and was shifted. Potasium 5.1 after medications. Nephrology consulted for LISBETH and severe metabolic acidosis. The patient's spouse denies a history of kidney issues. Her baseline SCr from 2015 to 2018 has been around ~ 1.0. Her appointment visits in October revealed a SCr of 1.5 and 1.8. The patient was admitted with a SCr of 6.1. Urine outputt has been between 10-30 ml/hr. She put out a total of 300 ml during night shift. UA today with 2+ protein. UPCR pending. LISBETH secondary to ATN likely caused by hypotension/sepsis in the setting of severe metabolic acidosis.     Plan:   No need for dialysis. Okay to remove femoral CL.   Patient with adequate urine output, 1.9 L/24 hr.   SCr trending down to 3.7 today, but still above baseline.   Urinary catheter removed on 11/10, strict I/Os.   Renal US completed on 11/7/18, showing reduced perfusion bilaterally   Urine creatinine/protein ratio was 0.51  Avoid NSAIDs, contrast, nephrotoxins   Monitor strict I/Os, daily weights  Renally dose medications to current GFR

## 2018-11-13 NOTE — ASSESSMENT & PLAN NOTE
Opioid overdose + acute renal failure from poor PO intake likely cause for metabolic encephalopathy.     Has resumed home opioid regimen with appropriate renal function and mental status.

## 2018-11-14 NOTE — PROGRESS NOTES
Patient being discharged home today.  Orders for ongoing, outpatient post-lung transplant care entered per department guidelines.  Instructed by Dr. Alvarez to schedule patient to return to clinic for chest x-ray, labs per guidelines, spirometry and doctor visit.  Per patient request, her appointments will be scheduled on Monday, November 26, 2018.

## 2018-11-14 NOTE — PROGRESS NOTES
[facility]  Nephrology  Progress Note    Patient Name: Juanita Ibarra  MRN: [id number]  Admission Date: 11/7/2018  Hospital Length of Stay: 7 days  Attending Provider: Jake Alvarez MD   Primary Care Physician: Jake Alvarez MD  Principal Problem:Acute kidney failure    Subjective:     HPI: The patient is a  28 year-old  female with a medical history of BLT secondary to CF. 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 Patient presents as a transfer from [facility] in [place] on 11/7 after presenting there with a 3 day history of lethargy and dizziness per spouse. The patient has a history of multiple hospitalizations 2/2 PNA and/or dyspnea over the last couple of months. The patient was somnolent during assessment, therefore, majority of HPI is from her spouse and records. The spouse states that the patient had been feeling nauseated for the last 3 days. He states that she also stopped eating during that time. Despite her decreased appetite, the spouse continued to give her all of her prescribed medications medications including her Morphine as normal. On admit to the OSH, her intial BP was 83/52, , and SPO2 was 95% on ?room air. According to the records, she also had a ?LISBETH and was hyperkalemic. She was given calcium gluconate, insulin, D50, and Kayexalate at the OSH. The patient was also given a total of 4 L of ?IVFs for her BP and was started on Zosyn. [place] [facility] [place] for an higher level of care. She was started on a bicarb gtt 2/2 severe metabolic acidosis and narcan drip 2/2 hypercapnic respiratory failure. ABG on admit was 6.9/59 and her bicarb was 12. ABG has improved to 7.2/48.6 and bicarb to 21.8 since being on bicarb drip. Patient on 2 L via NC.  The  "patient was hyperkalemic, 6.5, on admit, and was shifted. Potasium 5.1 after medications. Nephrology consulted for LISBETH and severe metabolic acidosis. The patient's spouse denies a history of kidney issues. Her baseline SCr from 2015 to 2018 has been around ~ 1.0. Her last appointment visits in October revealed a SCr of 1.5 and 1.8. The patient's  did state that a couple of months ago the patient was found to have a LISBETH during a outpatient appointment in [place]. He states that she was sent to ED and was given fluids and discharged home. He denied any issues around that time leading to her LISBETH and states it was "randomly" found on labs. The patient was admitted with a SCr of 6.1. Urine output has been between 10-30 ml/hr. She put out a total of 300 ml during night shift.     Nephrology consulted for management of LISBETH and metabolic acidosis.         Interval History: NAEON. SCr still improving. SCr 3.1 today, still above baseline but trending down. Urine output 1.3 L/24 hr. Last SLED treatment, 11/7. Patient pending discharge per note.         Review of patient's allergies indicates:   Allergen Reactions    Albuterol Palpitations    Colistin Anaphylaxis    Vancomycin analogues      Infusion reaction that does not resolve with slowing    Neupogen [filgrastim] Other (See Comments)     Ostealgia after five daily doses of 300 mcg.      Bactrim [sulfamethoxazole-trimethoprim] Hives    Ceftazidime Hives     Pt stated can tolerate cefapine not ceftazidime    Ceftazidime     Dronabinol Other (See Comments)     Mental changes/hallucinations    Haldol [haloperidol lactate] Other (See Comments)     Seizure like activity    Nsaids (non-steroidal anti-inflammatory drug)      Cannot have due to lung transplant    Adhesive Rash     Cloth tape- please use tegaderm or paper tape    Aztreonam Rash    Ciprofloxacin Nausea And Vomiting     Projectile N/V, per patient.  Unwilling to retry therapy.     Current " Facility-Administered Medications   Medication Frequency    acetaminophen tablet 1,000 mg Q6H PRN    amLODIPine tablet 10 mg Daily    ARIPiprazole tablet 2 mg Daily    diphenhydrAMINE capsule 25 mg Q6H PRN    diphenhydrAMINE injection 50 mg Q6H PRN    enoxaparin injection 30 mg Daily    fluticasone 50 mcg/actuation nasal spray 100 mcg Daily    fluticasone-vilanterol 100-25 mcg/dose diskus inhaler 1 puff Daily    gabapentin capsule 300 mg QHS    heparin, porcine (PF) 100 unit/mL injection flush 100 Units PRN    levalbuterol nebulizer solution 1.25 mg Q8H PRN    lipase-protease-amylase 12,000-38,000-60,000 units per capsule 5 capsule TID WM    LORazepam tablet 2 mg Q6H PRN    magnesium sulfate 2g in water 50mL IVPB (premix) PRN    And    magnesium sulfate 2g in water 50mL IVPB (premix) PRN    metoprolol tartrate (LOPRESSOR) tablet 25 mg BID    morphine 12 hr tablet 15 mg Q12H    mycophenolate capsule 500 mg BID    naloxone 0.4 mg/mL injection 0.4 mg PRN    nystatin 100,000 unit/mL suspension 500,000 Units TID    ondansetron injection 8 mg Q6H PRN    oxyCODONE immediate release tablet Tab 10 mg Q4H PRN    pantoprazole EC tablet 40 mg Daily    potassium chloride 10% oral solution 40 mEq PRN    And    potassium chloride 10% oral solution 40 mEq PRN    And    potassium chloride 10% oral solution 60 mEq PRN    potassium chloride SA CR tablet 20 mEq Once    predniSONE tablet 5 mg Daily    promethazine (PHENERGAN) 25 mg in dextrose 5 % 50 mL IVPB Q6H PRN    simethicone chewable tablet 80 mg TID PRN    tacrolimus capsule 0.5 mg BID    tiZANidine tablet 8 mg Q6H PRN    topiramate tablet 25 mg BID    venlafaxine 24 hr capsule 150 mg QHS       Objective:     Vital Signs (Most Recent):  Temp: 98.6 °F (37 °C) (11/14/18 0903)  Pulse: 76 (11/14/18 0903)  Resp: 18 (11/14/18 0903)  BP: 131/86 (11/14/18 0903)  SpO2: 95 % (11/14/18 0903)  O2 Device (Oxygen Therapy): room air (11/14/18 0903) Vital Signs  (24h Range):  Temp:  [97.9 °F (36.6 °C)-98.6 °F (37 °C)] 98.6 °F (37 °C)  Pulse:  [67-76] 76  Resp:  [16-18] 18  SpO2:  [93 %-97 %] 95 %  BP: (122-164)/(79-90) 131/86     Weight: 60.4 kg (133 lb 2.5 oz) (11/14/18 0500)  Body mass index is 25.16 kg/m².  Body surface area is 1.61 meters squared.    I/O last 3 completed shifts:  In: 1220 [P.O.:820; IV Piggyback:400]  Out: 2350 [Urine:2350]    Physical Exam   Constitutional: She is oriented to person, place, and time. She appears well-developed. She is cooperative. She does not have a sickly appearance. No distress.   HENT:   Head: Normocephalic and atraumatic.   Mouth/Throat: No oropharyngeal exudate.   Eyes: Right eye exhibits no discharge. Left eye exhibits no discharge.   Neck: Normal range of motion. Neck supple. No JVD present.   Cardiovascular: Normal rate, regular rhythm, normal heart sounds and intact distal pulses.   No murmur heard.  Pulmonary/Chest: Effort normal and breath sounds normal. No respiratory distress. She has no wheezes. She has no rhonchi. She has no rales.   Abdominal: Soft. Bowel sounds are normal. She exhibits no distension. There is no tenderness. There is no guarding.   Musculoskeletal: Normal range of motion. She exhibits no edema.   Neurological: She is alert and oriented to person, place, and time.   Skin: Skin is warm and dry. No rash noted. She is not diaphoretic. No erythema. There is pallor.   Psychiatric: She has a normal mood and affect. Her speech is normal and behavior is normal. Her mood appears not anxious. She does not exhibit a depressed mood.   Nursing note and vitals reviewed.      Significant Labs:  CBC:   Recent Labs   Lab 11/14/18  0430   WBC 4.12   RBC 2.99*   HGB 8.2*   HCT 26.9*   PLT 81*   MCV 90   MCH 27.4   MCHC 30.5*     CMP:   Recent Labs   Lab 11/14/18  0430   GLU 88   CALCIUM 7.9*   ALBUMIN 2.6*   PROT 5.0*      K 3.2*   CO2 26      BUN 22*   CREATININE 3.1*   ALKPHOS 342*   *   *    BILITOT 0.6            Assessment/Plan:     * Acute kidney failure    The patient is a  28 year-old  female with medical history of BLT secondary to CF. 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 Patient presents as a transfer from [facility] in [place] on 11/7 after presenting there with a 3 day history of lethargy and dizziness per spouse. [place] [facility] [place]. She was started on a bicarb gtt 2/2 severe metabolic acidosis and narcan drip 2/2 hypercapnic respiratory failure. ABG on admit was 6.9/59 and her bicarb was 12. ABG improved to 7.2/48.6 and bicarb to 21.8 since being on bicarb drip. Patient on 2 L via NC. The patient was hyperkalemic, 6.5, on admit, and was shifted. Potasium 5.1 after medications. Nephrology consulted for LISBETH and severe metabolic acidosis. The patient's spouse denies a history of kidney issues. Her baseline SCr from 2015 to 2018 has been around ~ 1.0. Her appointment visits in October revealed a SCr of 1.5 and 1.8. The patient was admitted with a SCr of 6.1. Urine outputt has been between 10-30 ml/hr. She put out a total of 300 ml during night shift. UA today with 2+ protein. UPCR pending. LISBETH secondary to ATN likely caused by hypotension/sepsis in the setting of severe metabolic acidosis.     Plan:   No need for dialysis. Possible discharge today per note.   Patient with adequate urine output, 1.3 L/24 hr.   SCr trending down to 3.1 today, but still above baseline.   Urinary catheter removed on 11/10, strict I/Os.   Renal US completed on 11/7/18, showing reduced perfusion bilaterally   Urine creatinine/protein ratio was 0.51  Avoid NSAIDs, contrast, nephrotoxins   Monitor strict I/Os, daily weights  Renally dose medications to current GFR       Case discussed with staff further recs  with attestation.     I will follow-up with patient. Please contact us if you have any additional questions.    GUSTABO Lunsford DNP, APRN, FNP-C  Department of Nephrology  [facility]  Pager: [phone]

## 2018-11-14 NOTE — SUBJECTIVE & OBJECTIVE
Interval History: NAEON. SCr still improving. SCr 3.1 today, still above baseline but trending down. Urine output 1.3 L/24 hr. Last SLED treatment, 11/7. Patient pending discharge per note.         Review of patient's allergies indicates:   Allergen Reactions    Albuterol Palpitations    Colistin Anaphylaxis    Vancomycin analogues      Infusion reaction that does not resolve with slowing    Neupogen [filgrastim] Other (See Comments)     Ostealgia after five daily doses of 300 mcg.      Bactrim [sulfamethoxazole-trimethoprim] Hives    Ceftazidime Hives     Pt stated can tolerate cefapine not ceftazidime    Ceftazidime     Dronabinol Other (See Comments)     Mental changes/hallucinations    Haldol [haloperidol lactate] Other (See Comments)     Seizure like activity    Nsaids (non-steroidal anti-inflammatory drug)      Cannot have due to lung transplant    Adhesive Rash     Cloth tape- please use tegaderm or paper tape    Aztreonam Rash    Ciprofloxacin Nausea And Vomiting     Projectile N/V, per patient.  Unwilling to retry therapy.     Current Facility-Administered Medications   Medication Frequency    acetaminophen tablet 1,000 mg Q6H PRN    amLODIPine tablet 10 mg Daily    ARIPiprazole tablet 2 mg Daily    diphenhydrAMINE capsule 25 mg Q6H PRN    diphenhydrAMINE injection 50 mg Q6H PRN    enoxaparin injection 30 mg Daily    fluticasone 50 mcg/actuation nasal spray 100 mcg Daily    fluticasone-vilanterol 100-25 mcg/dose diskus inhaler 1 puff Daily    gabapentin capsule 300 mg QHS    heparin, porcine (PF) 100 unit/mL injection flush 100 Units PRN    levalbuterol nebulizer solution 1.25 mg Q8H PRN    lipase-protease-amylase 12,000-38,000-60,000 units per capsule 5 capsule TID WM    LORazepam tablet 2 mg Q6H PRN    magnesium sulfate 2g in water 50mL IVPB (premix) PRN    And    magnesium sulfate 2g in water 50mL IVPB (premix) PRN    metoprolol tartrate (LOPRESSOR) tablet 25 mg BID     morphine 12 hr tablet 15 mg Q12H    mycophenolate capsule 500 mg BID    naloxone 0.4 mg/mL injection 0.4 mg PRN    nystatin 100,000 unit/mL suspension 500,000 Units TID    ondansetron injection 8 mg Q6H PRN    oxyCODONE immediate release tablet Tab 10 mg Q4H PRN    pantoprazole EC tablet 40 mg Daily    potassium chloride 10% oral solution 40 mEq PRN    And    potassium chloride 10% oral solution 40 mEq PRN    And    potassium chloride 10% oral solution 60 mEq PRN    potassium chloride SA CR tablet 20 mEq Once    predniSONE tablet 5 mg Daily    promethazine (PHENERGAN) 25 mg in dextrose 5 % 50 mL IVPB Q6H PRN    simethicone chewable tablet 80 mg TID PRN    tacrolimus capsule 0.5 mg BID    tiZANidine tablet 8 mg Q6H PRN    topiramate tablet 25 mg BID    venlafaxine 24 hr capsule 150 mg QHS       Objective:     Vital Signs (Most Recent):  Temp: 98.6 °F (37 °C) (11/14/18 0903)  Pulse: 76 (11/14/18 0903)  Resp: 18 (11/14/18 0903)  BP: 131/86 (11/14/18 0903)  SpO2: 95 % (11/14/18 0903)  O2 Device (Oxygen Therapy): room air (11/14/18 0903) Vital Signs (24h Range):  Temp:  [97.9 °F (36.6 °C)-98.6 °F (37 °C)] 98.6 °F (37 °C)  Pulse:  [67-76] 76  Resp:  [16-18] 18  SpO2:  [93 %-97 %] 95 %  BP: (122-164)/(79-90) 131/86     Weight: 60.4 kg (133 lb 2.5 oz) (11/14/18 0500)  Body mass index is 25.16 kg/m².  Body surface area is 1.61 meters squared.    I/O last 3 completed shifts:  In: 1220 [P.O.:820; IV Piggyback:400]  Out: 2350 [Urine:2350]    Physical Exam   Constitutional: She is oriented to person, place, and time. She appears well-developed. She is cooperative. She does not have a sickly appearance. No distress.   HENT:   Head: Normocephalic and atraumatic.   Mouth/Throat: No oropharyngeal exudate.   Eyes: Right eye exhibits no discharge. Left eye exhibits no discharge.   Neck: Normal range of motion. Neck supple. No JVD present.   Cardiovascular: Normal rate, regular rhythm, normal heart sounds and intact  distal pulses.   No murmur heard.  Pulmonary/Chest: Effort normal and breath sounds normal. No respiratory distress. She has no wheezes. She has no rhonchi. She has no rales.   Abdominal: Soft. Bowel sounds are normal. She exhibits no distension. There is no tenderness. There is no guarding.   Musculoskeletal: Normal range of motion. She exhibits no edema.   Neurological: She is alert and oriented to person, place, and time.   Skin: Skin is warm and dry. No rash noted. She is not diaphoretic. No erythema. There is pallor.   Psychiatric: She has a normal mood and affect. Her speech is normal and behavior is normal. Her mood appears not anxious. She does not exhibit a depressed mood.   Nursing note and vitals reviewed.      Significant Labs:  CBC:   Recent Labs   Lab 11/14/18  0430   WBC 4.12   RBC 2.99*   HGB 8.2*   HCT 26.9*   PLT 81*   MCV 90   MCH 27.4   MCHC 30.5*     CMP:   Recent Labs   Lab 11/14/18  0430   GLU 88   CALCIUM 7.9*   ALBUMIN 2.6*   PROT 5.0*      K 3.2*   CO2 26      BUN 22*   CREATININE 3.1*   ALKPHOS 342*   *   *   BILITOT 0.6

## 2018-11-14 NOTE — PROGRESS NOTES
Patient being discharged home today.  Discharge instructions and the outpatient plan of care as determined by Dr. Alvarez were reviewed with the patient and her significant other as follows:  1.  Medication changes:  - Prograf 1.5 mg by mouth twice a day.  - Stop lisinopril.  For blood pressure management, start metoprolol (Lopressor) 25 mg by mouth twice a day and amlodipine (Norvasc) 5 mg by mouth once a day.  - Hold Fioricet until further notice  Patient asked for all prescriptions to be sent to the [facility] outpatient pharmacy.  2.  Labs (CMP, tacrolimus level) on Monday, 11/19/18.  Instructed patient to take her morning Prograf dose after her blood is drawn.  She requested for the lab appointment to be scheduled at the [facility] location.    3.  Return for a chest x-ray, labs, spirometry and lung transplant clinic visit in 2 weeks.  The patient asked for these appointments to be scheduled on Monday, 11/26/18.  Reviewed times, locations, and special instructions (e.g., take morning Prograf dose after blood is drawn) for these appointments.  Provided verbal and written instructions re: this discharge plan.  The patient asked questions, which were answered to her satisfaction.  Her significant other denied having any questions.  Both verbalized their understanding of all information discussed and demonstrated their readiness for discharge.

## 2018-11-14 NOTE — ASSESSMENT & PLAN NOTE
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 Patient presents as a transfer from [facility] in [place] on 11/7 after presenting there with a 3 day history of lethargy and dizziness per spouse. [place] [facility] [place]. She was started on a bicarb gtt 2/2 severe metabolic acidosis and narcan drip 2/2 hypercapnic respiratory failure. ABG on admit was 6.9/59 and her bicarb was 12. ABG improved to 7.2/48.6 and bicarb to 21.8 since being on bicarb drip. Patient on 2 L via NC. The patient was hyperkalemic, 6.5, on admit, and was shifted. Potasium 5.1 after medications. Nephrology consulted for LISBETH and severe metabolic acidosis. The patient's spouse denies a history of kidney issues. Her baseline SCr from 2015 to 2018 has been around ~ 1.0. Her appointment visits in October revealed a SCr of 1.5 and 1.8. The patient was admitted with a SCr of 6.1. Urine outputt has been between 10-30 ml/hr. She put out a total of 300 ml during night shift. UA today with 2+ protein. UPCR pending. LISBETH secondary to ATN likely caused by hypotension/sepsis in the setting of severe metabolic acidosis.     Plan:   No need for dialysis. Possible discharge today per note.   Patient with adequate urine output, 1.3 L/24 hr.   SCr trending down to 3.1 today, but still above baseline.   Urinary catheter removed on 11/10, strict I/Os.   Renal US completed on 11/7/18, showing reduced perfusion bilaterally   Urine creatinine/protein ratio was 0.51  Avoid NSAIDs, contrast, nephrotoxins   Monitor strict I/Os, daily weights  Renally dose medications to current GFR

## 2018-11-14 NOTE — CONSULTS
"Food & Nutrition  Education     Diet Education: Renal   Time Spent: 15 min  Learners: Pt and Caregiver     Diet: Renal     Nutrition Education provided with handouts: Eating Right for Kidney Health     Comments: Consult received renal diet education". Provided and explained handout detailing tips for cutting back sodium,potassium and phosphorus. Presented meal planning and label reading tips. All questions and concerns answered.     Follow-Up: 1x weekly     Please re-consult as needed.     Thanks!      "

## 2018-11-14 NOTE — DISCHARGE SUMMARY
"[facility]  Lung Transplant  Discharge Summary      Patient Name: Juanita Ibarra  MRN: [id number]  Admission Date: 11/7/2018  Hospital Length of Stay: 7 days  Discharge Date and Time: 11/14/2018 12:17 PM  Attending Physician: Jake Alvarez MD   Discharging Provider: Jacqueline Dumont PA-C  Primary Care Provider: Jake Alvarez MD     HPI: Mrs. 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 Patient presents as a transfer via [facility] from [facility] in [place].     Patient presented to ED at outside hospital on 11/6/18 with initial complaints of feeling "really dizzy" and generally feeling unwell. Per family, patient woke up that morning feeling "disoriented". Patient also had been suffering from headaches and nausea/vomiting for the 2 previous days. Noted by ED nurse that patient hadn't eaten in about 24 hours. Intial BP was 83/52,  SPO2 95% Initial GCS noted as 15. Patient was then found to have LISBETH, severe AGMA, hypotension, and hyperkalemia. Mental status deteriorated while in ED and she became increasingly somnolent and obtunded. In the ED, patient received 4L IVF, Ca gluconate, insulin and D50, Kayexalate 30 g for hypotension and hyperkalemia. Patient then received 2.25 g Zosyn.     * No surgery found *     Hospital Course: [place] [facility] was nearly unresponsive with RR of 6, bilateral upper and lower extremity twitching with response to painful stimuli only. Initial VBG with 6.922/59/65 and Cr 7.2. Patient was started on a bicarb gtt and Narcan gtt. Patient was placed on SLED on 11/7 overnight with resolution of electrolyte imbalances and acidosis. Patient was started on broad spectrum antibiotics (initially Zosyn which was then transitioned to " cefepime and vancomycin for increased CNS penetration) and acyclovir for concerns for sepsis versus bacterial/viral meningitis. Once stabilized, CT Head was unremarkable for acute process. CSF studies from lumbar puncture on 11/9/2018 unremarkable although procedure was traumatic so cell counts inconclusive. CSF cultures and viral studies negative. Mental status was noted to drastically improve overnight on 11/9 and patient was alert and oriented x3 by 11/10. Serum creatinine remained drastically elevated at 2.4 - 4.1 throughout admission, so tacrolimus was initially held. Tacrolimus resumed 11/12 at 0.5 mg BID. Nephrology following patient while admitted. No need for further hemodialysis per nephrology recommendations as sCr and UOP improved. Antibiotics and antivirals were discontinued on 11/13. Patient's mental status remained at baseline for remainder of admission. Serum creatinine continued to trend downward with stable electrolytes. Patient's home medication of lisinopril was discontinued in setting of acute renal failure. Metoprolol and amlodipine were started.    Will resume tacrolimus at 1.5 mg BID. Patient to have labs drawn on 11/19, and will follow up with Dr. Alvarez in [facility] outpatient clinic within 2 weeks.     Acute kidney failure     Likely multifactorial 2/2 poor PO intake from acute illness coupled with nephrotoxic medications. Initially received 4L LR with bicarb gtt with minimal UOP. Received SLED 11/7 and discontinued 11/8 due to resolution of acidosis and hyperkalemia.      Creatinine 3.1 at discharge. Stable for discharge with no need for follow up with nephrology on outpatient basis unless her creatinine rises or urine output changes. Recommend patient to resume renal diet with low sodium intake.       Lung transplant status, bilateral     Underwent BLT for CF on 6/12/2014. 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      Will resume tacrolimus at 1.5 mg BID. Patient to have labs drawn on 11/19, and will follow up with Dr. Alvarez in LUT outpatient clinic within 2 weeks.       Prophylactic antibiotic     Continue dapsone 100 mg daily.      Immunosuppression     Will resume tacrolimus at 1.5 mg BID due to setting of acute renal failure. Continue  mg BID and prednisone 5 mg daily.      Bronchiolitis obliterans syndrome     Currently requiring 2L NC intermittently. Her acute respiratory failure with hypoventilation likely secondary to opioid overdose in setting of acute renal failure.       Chronic pain with opiate use     Opioid overdose + acute renal failure from poor PO intake likely cause for metabolic encephalopathy.      Home opioid regimen resumed while inpatient with appropriate renal function and mental status.                   Consults (From admission, onward)        Status Ordering Provider     Inpatient consult to Infectious Diseases  Once     Provider:  (Not yet assigned)    Completed SARIKA SHERMAN     Inpatient consult to Nephrology  Once     Provider:  (Not yet assigned)    Completed SARIKA SHERMAN     Inpatient consult to Registered Dietitian/Nutritionist  Once     Provider:  (Not yet assigned)    Acknowledged ARTURO LIU          Significant Diagnostic Studies: Labs: All labs within the past 24 hours have been reviewed  Microbiology:   Blood Culture   Lab Results   Component Value Date    LABBLOO No growth after 5 days. 11/07/2018       Pending Diagnostic Studies:     None        Final Active Diagnoses:    Diagnosis Date Noted POA    PRINCIPAL PROBLEM:  Acute kidney failure [N17.9] 11/07/2018 Yes    Lung transplant status, bilateral [Z94.2] 06/14/2014 Not Applicable    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] 12/19/2016 Yes    Immunosuppression [D89.9] 06/12/2014 Yes    Prophylactic antibiotic [Z79.2] 06/30/2014 Not Applicable    Chronic  pain with opiate use [G89.29] 12/20/2013 Yes    CF related Pancreatic insufficiency [K86.89] 12/20/2013 Yes    Opioid overdose [T40.2X1A] 11/07/2018 Yes    Acute kidney failure with lesion of tubular necrosis [N17.0]  Yes    Steroid-induced hyperglycemia [R73.9, T38.0X5A] 10/03/2018 Yes    Current chronic use of systemic steroids [Z79.52] 05/25/2018 Not Applicable    Hyperkalemia, diminished renal excretion [E87.5] 07/23/2015 Yes      Problems Resolved During this Admission:    Diagnosis Date Noted Date Resolved POA    Acute metabolic encephalopathy [G93.41] 11/08/2018 11/13/2018 Yes    Sepsis [A41.9] 11/06/2018 11/13/2018 Yes    Nausea [R11.0] 12/03/2013 11/08/2018 Yes      Discharged Condition: stable    Disposition: Home or Self Care    Medications:  Reconciled Home Medications:      Medication List      START taking these medications    amLODIPine 10 MG tablet  Commonly known as:  NORVASC  Take 1 tablet (10 mg total) by mouth once daily.  Start taking on:  11/15/2018     metoprolol tartrate 25 MG tablet  Commonly known as:  LOPRESSOR  Take 1 tablet (25 mg total) by mouth 2 (two) times daily.     nystatin 100,000 unit/mL suspension  Commonly known as:  MYCOSTATIN  Take 5 mLs (500,000 Units total) by mouth 3 (three) times daily. for 10 days        CHANGE how you take these medications    gabapentin 300 MG capsule  Commonly known as:  NEURONTIN  Take 1 capsule (300 mg total) by mouth 3 (three) times daily.  What changed:  how much to take     lipase-protease-amylase 36,000-114,000- 180,000 unit Cpdr  Commonly known as:  CREON  Take 4 capsules by mouth 3 (three) times daily with meals. Take 3 with snacks prn.  What changed:    · how much to take  · additional instructions     * tacrolimus 0.5 MG Cap  Commonly known as:  PROGRAF  Take 1 capsule (0.5 mg total) by mouth every 12 (twelve) hours. Total daily dose is 3.5 mg BID.  What changed:    · when to take this  · additional instructions     * tacrolimus 1  MG Cap  Commonly known as:  PROGRAF  Take 3 capsules (3 mg total) by mouth every 12 (twelve) hours.  What changed:  Another medication with the same name was changed. Make sure you understand how and when to take each.     topiramate 25 MG tablet  Commonly known as:  TOPAMAX  Take 1 tablet (25 mg total) by mouth 2 (two) times daily.  What changed:  how much to take     venlafaxine 75 MG 24 hr capsule  Commonly known as:  EFFEXOR-XR  Take 1 capsule (75 mg total) by mouth every evening.  What changed:  how much to take         * This list has 2 medication(s) that are the same as other medications prescribed for you. Read the directions carefully, and ask your doctor or other care provider to review them with you.            CONTINUE taking these medications    ARIPiprazole 2 MG Tab  Commonly known as:  ABILIFY  Take 2 mg by mouth once daily.     butalbital-acetaminophen-caffeine -40 mg -40 mg per tablet  Commonly known as:  FIORICET, ESGIC  TAKE 1 TABLET BY MOUTH EVERY 6 HOURS AS NEEDED FOR HEADACHE     calcium-vitamin D3 500 mg(1,250mg) -200 unit per tablet  Commonly known as:  OS-KATY 500 + D3  Take 1 tablet by mouth 2 (two) times daily with meals.     dapsone 100 MG Tab  Take 1 tablet (100 mg total) by mouth once daily.     diphenhydrAMINE 50 MG tablet  Commonly known as:  BENADRYL  Take 1 tablet (50 mg total) by mouth every 6 (six) hours as needed for Itching.     DULERA 100-5 mcg/actuation aa  Generic drug:  mometasone-formoterol  INHALE 1 PUFF BY MOUTH TWICE DAILY     fexofenadine 180 MG tablet  Commonly known as:  ALLEGRA  Take 180 mg by mouth once daily.     fluconazole 150 MG Tab  Commonly known as:  DIFLUCAN  TAKE 1 TABLET BY MOUTH EVERY WEEK     fluticasone 50 mcg/actuation nasal spray  Commonly known as:  FLONASE  INSERT 2 SPRAYS IN EACH NOSTRIL DAILY     folic acid 1 MG tablet  Commonly known as:  FOLVITE  Take 1 tablet (1,000 mcg total) by mouth once daily.     levalbuterol 1.25 mg/3 mL  nebulizer solution  Commonly known as:  XOPENEX  Take 3 mLs (1.25 mg total) by nebulization every 8 (eight) hours as needed for Wheezing or Shortness of Breath. Rescue     LORazepam 1 MG tablet  Commonly known as:  ATIVAN  Take 2 mg by mouth every 6 (six) hours as needed for Anxiety.     magnesium oxide 400 mg (241.3 mg magnesium) tablet  Commonly known as:  MAG-OX  Take 1 tablet (400 mg total) by mouth 2 (two) times daily.     montelukast 10 mg tablet  Commonly known as:  SINGULAIR  Take 1 tablet (10 mg total) by mouth once daily.     morphine 15 MG 12 hr tablet  Commonly known as:  MS CONTIN  Take 15 mg by mouth 2 (two) times daily.     multivit,min52-folic-vitK-cQ10 100-700-10 mcg-mcg-mg Cap cap  Commonly known as:  AQUADEKS  1 tab twice daily     mycophenolate 250 mg Cap  Commonly known as:  CELLCEPT  Take 2 capsules (500 mg total) by mouth 2 (two) times daily.     omeprazole 40 MG capsule  Commonly known as:  PRILOSEC  TAKE 1 CAPSULE BY MOUTH EVERY MORNING     ondansetron 8 MG tablet  Commonly known as:  ZOFRAN  TAKE 1 TABLET(8 MG) BY MOUTH EVERY 8 HOURS AS NEEDED FOR NAUSEA     oxyCODONE 5 MG immediate release tablet  Commonly known as:  ROXICODONE  Take 10 mg by mouth every 4 (four) hours as needed for Pain.     polyethylene glycol 17 gram Pwpk  Commonly known as:  GLYCOLAX  Take 17 g by mouth 2 (two) times daily.     predniSONE 5 MG tablet  Commonly known as:  DELTASONE  Take 1 tablet (5 mg total) by mouth once daily.     [facility]  Generic drug:  inhalation spacing device  USE AS DIRECTED     promethazine 25 MG tablet  Commonly known as:  PHENERGAN  TAKE 1 TABLET(25 MG) BY MOUTH EVERY 8 HOURS AS NEEDED     tiZANidine 4 MG tablet  Commonly known as:  ZANAFLEX  TAKE 2 TABLETS BY MOUTH EVERY 6 HOURS AS NEEDED        STOP taking these medications    lisinopril 10 MG tablet        \  Patient Instructions:      Diet renal     Notify your health care provider if you experience any of the following:  temperature  >100.4     Notify your health care provider if you experience any of the following:  persistent nausea and vomiting or diarrhea     Notify your health care provider if you experience any of the following:  severe uncontrolled pain     Notify your health care provider if you experience any of the following:  redness, tenderness, or signs of infection (pain, swelling, redness, odor or green/yellow discharge around incision site)     Notify your health care provider if you experience any of the following:  difficulty breathing or increased cough     Notify your health care provider if you experience any of the following:  severe persistent headache     Notify your health care provider if you experience any of the following:  worsening rash     Notify your health care provider if you experience any of the following:  persistent dizziness, light-headedness, or visual disturbances     Notify your health care provider if you experience any of the following:  increased confusion or weakness     Notify your health care provider if you experience any of the following:     Activity as tolerated     Follow Up:  Follow-up Information     Jake Alvarez MD In 2 weeks.    Specialty:  Transplant  Contact information:  [facility] [address] [facility] [address]  [phone]                   Jacqueline Dumont PA-C  Lung Transplant  [facility]

## 2018-11-14 NOTE — NURSING
Reviewed with patient discharge instructions. Port-a-cath deaccessed by HILARIO Carter RN. Pt awaiting scripts and then will discharge home with .

## 2018-11-21 NOTE — TELEPHONE ENCOUNTER
Received written order from Dr. Alvarez to increase am Prograf dose by 0.5 mg - dose changed to 2 mg in am, 1.5 mg in pm (from 1.5 mg every 12 hours).  Contacted patient, confirmed that the Prograf level is an accurate 12 hour trough and current dose before the dose change was 1.5 mg every 12 hours then gave her dose change instructions which she stated she had written down and was able to repeat back correctly. Will have repeat lab work on 11/26/18.

## 2018-11-21 NOTE — TELEPHONE ENCOUNTER
----- Message from Jake Alvarez MD sent at 11/21/2018  4:27 PM CST -----  Increase AM tacro to 4 mg  ----- Message -----  From: Jena Overton RN  Sent: 11/21/2018   3:49 PM  To: Jake Alvarez MD, Enedina SMITH Do, RN    Labs done today - Prograf level reflects dose of 1.5 mg every 12 hours. She is coming for a clinic visit Monday 11/26/18

## 2018-11-26 NOTE — PROGRESS NOTES
LUNG TRANSPLANT RECIPIENT FOLLOW-UP    Reason for Visit: Follow-up after lung transplantation.      Date of Transplant: 6/12/14     Reason for Transplant: Cystic fibrosis      Type of Transplant: bilateral sequential lung     CMV Status: D+ / R-      Major Complications:   1. Vocal cord dysfunction- resolved   2. A1 Rejection 8/2014   3. C glabrata positive sputum-recurrent  4. Pseudomonas pneumonia in 02/2015 resolved   5. 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 CARLOS EDUARDO (grade 3)                                                                               History of Present Illness: Juanita Ibarra is a 28 y.o. year old female with the above listed transplant history who presents for routine follow-up.  Since her last clinic visit, she was hospitalized for AMS and ARF.  She recovered from that and was discharged.  Since discharge, she has been doing well.  Continues to complain of dyspnea with exertion and generalized weakness.  Has home supplemental oxygen which she uses occasionally.  Says she monitors her oxygen saturations and rarely needs oxygen during the day or with exertion.  Has stable dry non-productive cough.  Denies any fevers or sick contacts.      Review of Systems   Constitutional: Negative for chills, diaphoresis, fever, malaise/fatigue and weight loss.   HENT: Negative for congestion, ear discharge, ear pain, hearing loss, nosebleeds and sore throat.    Eyes: Negative for blurred vision, double vision, photophobia and pain.   Respiratory: Positive for cough and shortness of breath. Negative for hemoptysis, sputum production, wheezing and stridor.    Cardiovascular: Negative for chest pain, palpitations, orthopnea, claudication, leg swelling and PND.   Gastrointestinal: Negative for abdominal pain, blood in stool, constipation, diarrhea, heartburn, melena, nausea and vomiting.   Genitourinary: Negative for dysuria, flank pain, frequency, hematuria and urgency.   Musculoskeletal: Positive for myalgias. Negative  "for back pain, falls, joint pain and neck pain.   Skin: Negative for itching and rash.   Neurological: Positive for weakness. Negative for dizziness, tingling, tremors, sensory change, focal weakness, seizures, loss of consciousness and headaches.   Endo/Heme/Allergies: Does not bruise/bleed easily.   Psychiatric/Behavioral: Negative for depression, hallucinations, memory loss and suicidal ideas. The patient is not nervous/anxious and does not have insomnia.      /84 (BP Location: Right arm, Patient Position: Sitting)   Pulse 95   Temp 96.9 °F (36.1 °C)   Resp 20   Ht 5' 1" (1.549 m)   Wt 56.7 kg (125 lb)   LMP 10/02/2016   SpO2 95%   BMI 23.62 kg/m²     Physical Exam   Constitutional: She is oriented to person, place, and time and well-developed, well-nourished, and in no distress. No distress.   HENT:   Head: Normocephalic and atraumatic.   Nose: Nose normal.   Mouth/Throat: Oropharynx is clear and moist. No oropharyngeal exudate.   Eyes: Conjunctivae and EOM are normal. Pupils are equal, round, and reactive to light. No scleral icterus.   Neck: Normal range of motion. Neck supple. No JVD present. No tracheal deviation present. No thyromegaly present.   Cardiovascular: Normal rate, regular rhythm and intact distal pulses. Exam reveals no gallop and no friction rub.   No murmur heard.  Pulmonary/Chest: Effort normal and breath sounds normal. No stridor. No respiratory distress. She has no wheezes. She has no rales. She exhibits no tenderness.   Abdominal: Soft. Bowel sounds are normal. She exhibits no distension and no mass. There is no tenderness. There is no rebound and no guarding.   Musculoskeletal: Normal range of motion. She exhibits no edema.   Lymphadenopathy:     She has no cervical adenopathy.   Neurological: She is alert and oriented to person, place, and time. No cranial nerve deficit. Gait normal. Coordination normal. GCS score is 15.   Skin: Skin is warm and dry. No rash noted. She is " not diaphoretic. No erythema. No pallor.   Psychiatric: Mood and affect normal.     Labs:  cbc, cmp, tacrolimus Latest Ref Rng & Units 11/13/2018 11/14/2018 11/26/2018   TACROLIMUS LVL 5.0 - 15.0 ng/mL 1.9(L) 2.8(L) -   WHITE BLOOD CELL COUNT 3.90 - 12.70 K/uL 4.88 4.12 6.50   RBC 4.00 - 5.40 M/uL 2.92(L) 2.99(L) 3.30(L)   HEMOGLOBIN 12.0 - 16.0 g/dL 7.9(L) 8.2(L) 9.2(L)   HEMATOCRIT 37.0 - 48.5 % 26.5(L) 26.9(L) 30.5(L)   MCV 82 - 98 fL 91 90 92   MCH 27.0 - 31.0 pg 27.1 27.4 27.9   MCHC 32.0 - 36.0 g/dL 29.8(L) 30.5(L) 30.2(L)   RDW 11.5 - 14.5 % 14.7(H) 14.8(H) 15.2(H)   PLATELETS 150 - 350 K/uL 69(L) 81(L) 169   MPV 9.2 - 12.9 fL 10.1 12.6 10.8   GRAN # 1.8 - 7.7 K/uL 2.8 2.4 4.1   LYMPH # 1.0 - 4.8 K/uL 1.2 1.2 1.8   MONO # 0.3 - 1.0 K/uL 0.4 0.3 0.5   EOSINOPHIL% 0.0 - 8.0 % 8.4(H) 6.8 1.5   BASOPHIL% 0.0 - 1.9 % 0.6 0.5 0.8   DIFFERENTIAL METHOD - Automated Automated Automated   SODIUM 136 - 145 mmol/L 143 143 141   POTASSIUM 3.5 - 5.1 mmol/L 3.5 3.2(L) 4.2   CHLORIDE 95 - 110 mmol/L 107 106 106   CO2 23 - 29 mmol/L 27 26 24   GLUCOSE 70 - 110 mg/dL 123(H) 88 148(H)   BUN BLD 6 - 20 mg/dL 23(H) 22(H) 35(H)   CREATININE 0.5 - 1.4 mg/dL 3.7(H) 3.1(H) 1.6(H)   CALCIUM 8.7 - 10.5 mg/dL 7.9(L) 7.9(L) 9.2   PROTEIN TOTAL 6.0 - 8.4 g/dL 5.1(L) 5.0(L) 7.6   ALBUMIN 3.5 - 5.2 g/dL 2.7(L) 2.6(L) 3.9   BILIRUBIN TOTAL 0.1 - 1.0 mg/dL 0.3 0.6 0.4   ALK PHOS 55 - 135 U/L 164(H) 342(H) 323(H)   AST 10 - 40 U/L 136(H) 295(H) 218(H)   ALT 10 - 44 U/L 35 159(H) 191(H)   ANION GAP 8 - 16 mmol/L 9 11 11   EGFR IF AFRICAN AMERICAN >60 mL/min/1.73 m:2 18.1(A) 22.4(A) 49.8(A)   EGFR IF NON-AFRICAN AMERICAN >60 mL/min/1.73 m:2 15.7(A) 19.4(A) 43.2(A)     Pulmonary Function Tests 11/26/2018 10/22/2018 6/18/2018 4/23/2018 1/15/2018 11/13/2017 9/25/2017   FVC 1.85 2.23 2.52 2.21 2.2 2.03 2.3   FEV1 0.78 0.9 0.93 0.88 0.9 0.84 0.88   TLC (liters) - - - - - - -   DLCO (ml/mmHg sec) - - - - - - -   FVC% 54.2 65.3 78 69 68 63 71   FEV1%  26.6 30.9 33 31 32 30 31   FEF 25-75 0.33 0.35 0.35 0.36 0.39 0.33 0.35   FEF 25-75% 9.8 10.5 11 11 12 10 10   TLC% - - - - - - -   RV - - - - - - -   RV% - - - - - - -   DLCO% - - - - - - -       Imaging:  Results for orders placed during the hospital encounter of 11/26/18   X-Ray Chest PA And Lateral    Narrative EXAMINATION:  XR CHEST PA AND LATERAL    CLINICAL HISTORY:  s/p bilateral lung transplant 6/12/2014; Lung transplant status    TECHNIQUE:  PA and lateral views of the chest were performed.    COMPARISON:  X-ray chest AP portable 11/07/2018    FINDINGS:  Additional history: Status post bilateral sequential lung transplant for cystic fibrosis.    Right-sided chest port with distal tip terminating near the junction of the SVC and right atrium. Remote operative change of median sternotomy for bilateral lung transplant with wires well-aligned in unchanged position. Surgical clips present in the right upper quadrant, likely reflecting prior cholecystectomy.    Mediastinal structures are midline. Cardiac silhouette and pulmonary vascular distribution are normal.    Lung volumes are normal and symmetric. I detect no pulmonary disease, pleural fluid, lymph node enlargement, cardiac decompensation, pneumothorax, pneumomediastinum, pneumoperitoneum or significant osseous abnormality.      Impression Remote operative change of bilateral lung transplant with no acute abnormality identified.    Electronically signed by resident: Dannie Zamora MD  Date:    11/26/2018  Time:    08:36    Electronically signed by: Adela Davalos MD  Date:    11/26/2018  Time:    09:27       Assessment:  1. Encounter following organ transplant    2. Lung replaced by transplant    3. Immunosuppression    4. Prophylactic antibiotic    5. Bronchiolitis obliterans syndrome    6. Acute renal failure, unspecified acute renal failure type    7. CF related Pancreatic insufficiency      Plan:   1. FEV1 continues to decline consisted with her  known CARLOS EDUARDO.  CXR unchanged.  Symptomatically stable from a respiratory standpoint.  Has continued weakness following her hospitalization and will refer to PT.    2. Continue with Tacrolimus 1.5mg BID, MMF 500mg BID, and Prednisone.  Follow-up Tacrolimus level and adjust as needed.    3. Continue with Dapsone.    4. Has known CARLOS EDUARDO stage 3 with continued decline in FEV1.  Requires prn supplemental oxygen.  Dyspnea stable.  Encouraged daily exercise.  Will refer for PT.    5. Recently hospitalized for ARF likely medication related.  Creatinine today 1.6.  Will continue to monitor.      6. Continue with pancreatic enzymes.  Will follow-up Vitamin levels.    7. Follow-up in 1 month.        Francisca Morin D.O.  Pulmonary/Critical Care Medicine

## 2018-12-04 PROBLEM — R06.02 SOB (SHORTNESS OF BREATH): Status: ACTIVE | Noted: 2018-01-01

## 2018-12-04 PROBLEM — D69.6 THROMBOCYTOPENIA: Status: ACTIVE | Noted: 2018-01-01

## 2018-12-04 NOTE — TELEPHONE ENCOUNTER
Patient called back this morning, feels rattling in her chest, unable to clear it.  Started Mucinex yesterday.  Waiting to hear back from internist if she can do the sputum culture and nasal swab tomorrow.  Advised patient to continue the mucinex as directed.  Informed patient that Dr. Morin will be notified and I will call her back if there are new recommendations.  Patient verbalized understanding.     Received written instructions from Dr. Morin to have patient come to the ED at the main [place] for admission.  Contacted patient and patient agreed to come here for admission.  Patient will be arriving late tonight.  Dr. Morin notified of patient arrival time.

## 2018-12-04 NOTE — TELEPHONE ENCOUNTER
----- Message from Apolonia Machuca sent at 12/4/2018  9:21 AM CST -----  Calling to speak w/coordinator regarding chest congestion and medication that she is taken/pt taking mucinex    Pls contact to confirm @ [phone]

## 2018-12-04 NOTE — TELEPHONE ENCOUNTER
Received message from patient asking to have her Xopenex sent to [facility] as she's having trouble finding a pharmacy that has it in stock.  Rx for Xopenex entered and sent to Dr. Morin for review and approval.

## 2018-12-05 PROBLEM — N17.9 ACUTE KIDNEY FAILURE: Status: RESOLVED | Noted: 2018-01-01 | Resolved: 2018-01-01

## 2018-12-05 NOTE — ASSESSMENT & PLAN NOTE
Dyspnea with chest congestion since Friday.  Suspect viral bronchitis.  Little by history to suggest influenza, will not empirically treat for now.  Checking RVP, sputum culture.  No leukocytosis, fever, no clear infiltrate on CXR - will hold off on abx for now.  Checking procalcitnonin.  Low threshold to start abx for any clinical worsening.  Xopenex nebs (reported palpitations w/ albuterol) and continue expectorants for chest congestion.  Wean O2 for sats > 88%.  She does have some IN depression in her inferior leads which is strange - ? Pericarditis though the EKG findings aren't as global as I would expect, no obvious ST elevations or depressions - will check trop, BNP.  Repeat EKG.

## 2018-12-05 NOTE — ED NOTES
Adult Physical Assessment:    Appearance: Patient resting comfortably on stretcher, and is in no acute distress at this time. Patient is clean and well groomed with a gown on. Patient has no facial grimacing, and no indications of being in pain currently.    Cardiac: Heart sounds audible and without abnormalities noted. Patient attached to continuous cardiac monitor, automatic/cycling BP cuff, and continuous pulse ox. Patient currently NST (110-112) No peripheral edema noted. No complaints of chest pain    Neuro/LOC: Eyes open spontaneously, behavior appropriate to situation, follows commands, facial expression symmetrical, purposeful motor response noted. AAOx4; The patient is awake, alert and aware of environment with an appropriate affect, and speaking appropriately. Patient denies dizziness and HA    Respiratory: Breath sounds audible, inspiratory wheezing bilaterally. Course crackles heard at the bases. Patient has non-productive cough,.  Airway is open and patent, respirations are spontaneous, even, and unlabored. Normal effort and rate noted, and without accessory muscle use. Patient currently on room air. Patient has history of bilateral lung transplant     Skin: Intact, warm and dry. Color is consistent with ethnicity. Normal skin turgor and moist mucous membranes.    Gastro: Bowel sounds audible and active x4 quadrants. Abdomen is soft but round    Musculoskeletal: Patient moving all extremities spontaneously. No obvious swelling or deformities noted.     Peripheral vascular: Pulses +2 x4 upper/lower extremities.     -Patient identifiers verified and correct for this patient.  -Patient resting with bedrails up x2 for safety, bed locked in low position, and call bell within reach.  -Allergy band and fall risk band applied to patient for safety

## 2018-12-05 NOTE — ASSESSMENT & PLAN NOTE
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      Continue immunosuppression and ppx.  Daily tacrolimus levels.

## 2018-12-05 NOTE — PROGRESS NOTES
[facility]  Lung Transplant  Progress Note - Floor    Patient Name: Juanita Ibarra  MRN: [id number]  Admission Date: 12/4/2018  Hospital Length of Stay: 1 days  Post-Operative Day: 1637  Attending Physician: Jake Alvarez MD  Primary Care Provider: Jake Alvarez MD     Subjective:     Interval History: No acute events overnight. Patient continues to report chest congestion and pleuritic chest pain. She also reports vague abdominal pain as well as ongoing headaches and dyspnea with minimal exertion.     Continuous Infusions:  Scheduled Meds:   amLODIPine  10 mg Oral Daily    ARIPiprazole  2 mg Oral Daily    calcium-vitamin D3  1 tablet Oral BID WM    ceFEPime (MAXIPIME) IVPB  1 g Intravenous Q8H    cetirizine  5 mg Oral Daily    dapsone  100 mg Oral Daily    diphenhydrAMINE  50 mg Intravenous BID    enoxaparin  40 mg Subcutaneous Daily    fluticasone  1 spray Each Nare Daily    fluticasone-vilanterol  1 puff Inhalation Daily    folic acid  1,000 mcg Oral Daily    gabapentin  300 mg Oral TID    guaiFENesin  600 mg Oral BID    levalbuterol  1.25 mg Nebulization Q6H    lipase-protease-amylase 24,000-76,000-120,000 units  5 capsule Oral TID WM    magnesium oxide  400 mg Oral BID    metoprolol tartrate  25 mg Oral BID    montelukast  10 mg Oral Daily    morphine  15 mg Oral BID    multivit-min-FA-coenzyme Q10 100-5 mcg-mg  1 tablet Oral Daily    mycophenolate  500 mg Oral BID    oseltamivir  75 mg Oral BID    pantoprazole  40 mg Oral Daily    polyethylene glycol  17 g Oral BID    predniSONE  5 mg Oral Daily    tacrolimus  1.5 mg Oral Daily    tacrolimus  2 mg Oral Daily    topiramate  25 mg Oral BID    vancomycin (VANCOCIN) IVPB  750 mg Intravenous Q12H    venlafaxine  150 mg Oral QHS    venlafaxine  37.5 mg Oral QHS     PRN Meds:diphenhydrAMINE, LORazepam, ondansetron, oxyCODONE, tiZANidine    Review of patient's allergies indicates:   Allergen  Reactions    Albuterol Palpitations    Colistin Anaphylaxis    Vancomycin analogues      Infusion reaction that does not resolve with slowing    Neupogen [filgrastim] Other (See Comments)     Ostealgia after five daily doses of 300 mcg.      Bactrim [sulfamethoxazole-trimethoprim] Hives    Ceftazidime Hives     Pt stated can tolerate cefapine not ceftazidime    Ceftazidime     Dronabinol Other (See Comments)     Mental changes/hallucinations    Haldol [haloperidol lactate] Other (See Comments)     Seizure like activity    Nsaids (non-steroidal anti-inflammatory drug)      Cannot have due to lung transplant    Adhesive Rash     Cloth tape- please use tegaderm or paper tape    Aztreonam Rash    Ciprofloxacin Nausea And Vomiting     Projectile N/V, per patient.  Unwilling to retry therapy.       Review of Systems   Constitutional: Positive for fatigue. Negative for activity change, appetite change, chills, diaphoresis, fever and unexpected weight change.   HENT: Positive for congestion and sore throat. Negative for drooling, ear discharge, ear pain, facial swelling, hearing loss and trouble swallowing.    Eyes: Negative for discharge and itching.   Respiratory: Positive for cough (dry), chest tightness, shortness of breath and wheezing. Negative for apnea, choking and stridor.    Cardiovascular: Positive for chest pain (pleuritic). Negative for palpitations and leg swelling.   Gastrointestinal: Positive for abdominal pain. Negative for abdominal distention, blood in stool and constipation.   Endocrine: Negative.    Genitourinary: Negative for difficulty urinating and dysuria.   Musculoskeletal: Positive for back pain. Negative for arthralgias, gait problem, joint swelling and myalgias.   Skin: Negative for color change and pallor.   Neurological: Negative for dizziness and headaches.   Hematological: Negative for adenopathy.   Psychiatric/Behavioral: Negative for agitation, behavioral problems and  confusion.     Objective:   Physical Exam   Constitutional: She is oriented to person, place, and time. She appears well-developed and well-nourished.   HENT:   Head: Normocephalic and atraumatic.   Mouth/Throat: No oropharyngeal exudate.   Dry mucus membranes   Eyes: EOM are normal. Pupils are equal, round, and reactive to light. Right eye exhibits no discharge. No scleral icterus.   Neck: Normal range of motion. No thyromegaly present.   Cardiovascular: Normal rate and regular rhythm. Exam reveals no friction rub.   No murmur heard.  Pulmonary/Chest: Effort normal. No stridor. No respiratory distress. She has wheezes (scant, expiratory). She has rhonchi (diffuse). She has no rales.   Abdominal: Soft. She exhibits no distension. There is no tenderness. There is no guarding.   Musculoskeletal: She exhibits no edema or tenderness.   Neurological: She is alert and oriented to person, place, and time.   Skin: Skin is warm and dry.   Psychiatric: She has a normal mood and affect.         Vital Signs (Most Recent):  Temp: 98.4 °F (36.9 °C) (12/05/18 1150)  Pulse: 92 (12/05/18 1348)  Resp: 16 (12/05/18 1348)  BP: 126/79 (12/05/18 1150)  SpO2: 100 % (12/05/18 1348) Vital Signs (24h Range):  Temp:  [97.5 °F (36.4 °C)-98.7 °F (37.1 °C)] 98.4 °F (36.9 °C)  Pulse:  [] 92  Resp:  [12-20] 16  SpO2:  [92 %-100 %] 100 %  BP: (110-136)/(66-82) 126/79     Weight: 56.7 kg (125 lb)  Body mass index is 24.41 kg/m².      Intake/Output Summary (Last 24 hours) at 12/5/2018 1536  Last data filed at 12/5/2018 1400  Gross per 24 hour   Intake 1000 ml   Output 950 ml   Net 50 ml       Significant Labs:  CBC:  Recent Labs   Lab 12/05/18  0407   WBC 4.23   RBC 2.79*   HGB 7.9*   HCT 25.9*   PLT 89*   MCV 93   MCH 28.3   MCHC 30.5*     BMP:  Recent Labs   Lab 12/05/18  0407      K 3.8      CO2 21*   BUN 22*   CREATININE 1.2   CALCIUM 8.8      Tacrolimus Levels:  Recent Labs   Lab 12/05/18  0407   TACROLIMUS 6.3      Microbiology:  Microbiology Results (last 7 days)     Procedure Component Value Units Date/Time    Blood Culture #1 **CANNOT BE ORDERED STAT** [[id number]] Collected:  12/04/18 2120    Order Status:  Completed Specimen:  Blood from Line, Subclavian, Right Updated:  12/05/18 0515     Blood Culture, Routine No Growth to date    Blood culture [[id number]] Collected:  12/04/18 2151    Order Status:  Completed Specimen:  Blood from Peripheral, Antecubital, Right Updated:  12/05/18 0515     Blood Culture, Routine No Growth to date    Respiratory Viral Panel by PCR [facility]; Nasal Swab [[id number]] Collected:  12/05/18 0058    Order Status:  Sent Specimen:  Respiratory Updated:  12/05/18 0102    Culture, Respiratory with Gram Stain [[id number]]     Order Status:  No result Specimen:  Respiratory           I have reviewed all pertinent labs within the past 24 hours.    Diagnostic Results:  Labs: Reviewed  ECG: Reviewed  X-Ray: Reviewed  CT: Reviewed  Mild nodular ground-glass attenuation in the posterior segment of the right upper lobe, unchanged.  Appearance of the pulmonary parenchyma is overall back to baseline when compared to December 2016.  There is thickening of the posterior wall of the bronchus intermedius which is new since prior examination which could represent inflammation.  No large focal consolidation, pneumothorax, significant pleural thickening, or lung mass.  No pleural effusions.    Assessment/Plan:     Complication of lung transplant    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 Concerns for progression of her CARLOS EDUARDO versus infectious etiology. RVP, sputum culture ordered. Procalcitonin negative. CXR without new infiltrates. Troponin and BNP unremarkable. CT chest 12/5 - GGOs relatively unchanged from prior examination. TTE on 12/5 with EF of 55%, mild left atrial  enlargement, PAP 26, mild tricuspid regurg, normal LV/RV function.      Continue immunosuppression and ppx. Continue oxygen supplementation to maintain goal O2 sats >88%. Continue empiric tamiflu/Cefepime/Vanc. Will follow up on cultures. ID consulted, appreciate recs.      Immunosuppression    Continue outpatient regimen of prednisone 5 mg daily,  mg BID, and tacrolimus 2 mg in the AM, and 1.5 in PM. Will monitor daily tacrolimus levels and adjust dose as needed.      Prophylactic antibiotic    Continue dapsone 100 mg daily.      Bronchiolitis obliterans syndrome    Patient with persistent decline on outpatient PFTs. Concerns for progression of CARLOS EDUARDO versus infectious etiology. Will continue oxygen supplementation to maintain O2 sats >88%.      Anxiety disorder    Continue current outpatient regimen with Effexor, Abilify, and ativan.      Chronic pain with opiate use    Continue current outpatient pain regimen.      CF related Pancreatic insufficiency    Continue creon with meals and snacks.      Transaminitis    LFTs noted to be >3 x ULN. Hepatitis panel ordered. Liver US unremarkable. Likely CF related. Hepatology consulted, appreciate recs.          Shama Canales PA-C  Lung Transplant  [facility]

## 2018-12-05 NOTE — SUBJECTIVE & OBJECTIVE
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       Past Surgical History:   Procedure Laterality Date    ABDOMINAL SURGERY      peg tube     DLBQDQAS-[id number]-[id number] N/A 5/19/2015    Performed [facility]y Dias [facility] OR    BIOPSY-BRONCHUS N/A 12/20/2016    Performed by Jake [facility]varez MD at [facility] OR 2ND FLR    BRONCHOSCOPY N/A 5/29/2018    Procedure: BRONCHOSCOPY; Bronchoscopy with BAL and transbronchial biopsies under general anesthesia;  Surgeon: Jake Alvarez MD;  [facility];  Service: Transplant;  Laterality: N/A;    BRONCHOSCOPY N/A 10/1/2018    Procedure: BRONCHOSCOPY;  Surgeon: Jake Alvarez MD;  [facility];  Service: Transplant;  Laterality: N/A;    BRONCHOSCOPY N/A 10/1/2018    Performed by Jake [facility]varez MD at [facility] OR 2ND [facility]LR    BRONCHOSCOPY Bilateral 12/20/2016    Performed by [facility] MD at [facility] OR [facility]    BRONCHOSCOPY - flexible bronchoscopy with probable tissue biopsy CPT 83273 N/A 7/21/2015    Performed by Jake [facility]varez MD at [facility] OR 2ND [facility]    BRONCHOSCOPY - flexible bronchoscopy with probable tissue biospy CPT 92919 N/A 10/20/2015    Performed by Jake [facility]varez MD at [facility] OR 2ND [facility]LR    BRONCHOSCOPY - flexible bronchoscopy with tissue biopsy CPT 71696 N/A 9/29/2015    Performed by Jake [facility]varez MD at [facility] OR 2ND FLR    BRONCHOSCOPY - flexible bronchoscopy with tissue biopsy CPT 37639 N/A 5/26/2015    Performed by Jake Alvarez MD at [facility] OR 1ST FLR    BRONCHOSCOPY flexible bronchoscopy with tissue biopsy  N/A 9/8/2014    Performed by Jake Alvarez MD at [facility] OR [address] [place] [address]    BRONCHOSCOPY flexible with possible tissue biopsy N/A 8/8/2014    Performed by Jake Alvarez MD at [facility] OR [address] [place] [address]    BRONCHOSCOPY, BAL, BIOPSIES N/A 3/20/2018    Performed by Jake lAvarez MD at [facility] OR [address] [place] [address]    BRONCHOSCOPY-FIBEROPTIC N/A 1/29/2016    Performed by Joann Cifuentes DO at [facility] OR [address] [place] [address]    BRONCHOSCOPY; Bronchoscopy with BAL and transbronchial biopsies under general anesthesia N/A 5/29/2018    Performed by Jake Alvarez MD at [facility] OR [address] [place] [address]    CHOLECYSTECTOMY  2016    CHOLECYSTECTOMY-LAPAROSCOPIC N/A 7/22/2016    Performed by Joshua Goldberg, MD at [facility] OR [address] [place] [address]    ENDOMETRIAL ABLATION  2015    KJB    FESS      FESS Bilateral 10/21/2013    Performed by Jared Whatley MD at [facility] OR [address] [place] [address]    FINE NEEDLE ASPIRATION (FNA)  of a cervical lymphadenopathy  1/29/2016    Performed by Joann Cifuentes DO at [facility] OR [address] [place] [address]    flex bronchoscopy with probable tissue biopsy N/A 7/31/2014    Performed by [facility] at [facility] OR [address] [place] [address]    flexible bronchoscopy with tissue biopsy N/A 12/11/2014    Performed by Jake Alvarez MD at [facility] OR [address] [place] [address]    INJECTION-VOCAL CORD Left 6/24/2014    Performed by Jared Whatley MD at [facility] OR [address] [place] [address]    [id number]-WZLZ-M-CATH to right chest and removal of portacath to left chests wall. Bilateral 6/24/2014    Performed by Zhen Dorado MD at [facility] OR [address] [place] [address]    LARYNX SURGERY  2016    LUNG TRANSPLANT Bilateral 6/12/14    MYRINGOTOMY W/ TUBES Right 04/2017    MYRINGOTOMY WITH INSERTION OF PE TUBES Right 4/15/2017    [facility]    PLACEMENT-TUBE-PEG  5/15/2014    [facility]    PORTACATH PLACEMENT Right     rt sc    REMOVAL-TUBE-PEG  5/15/2014    Performed by David Moses MD at [facility] OR [address] [place] [address]    ROBOTIC ASSISTED LAPAROSCOPIC HYSTERECTOMY N/A  4/25/2017    Performed by [facility]y Dias Jr[facility] MD at [facility] OR    SALPINGECTOMY Bilateral 2015    KJB    SALPINGECTOMY-LAPAROSCOPIC Bilateral 5/19/2015    Performed by Deny Dias Jr[facility] MD at [facility] OR    SINUS SURGERY FUNCTIONAL ENDOSCOPIC WITH NAVIGATION Bilateral 4/3/2017    Performed by [facility]esar Olsen MD at [facility] OR [facility]    SINUS SURGERY FUNCTIONAL ENDOSCOPIC WITH NAVIGATION, 29193, 34734, 53920, 21213 Bilateral 2/5/2015    [facility]    THYROPLASTY - Medialization laryngoplasty, cricothyroid subluxation, arytenoid repositioning Left 8/2/2016    Performed by Gary Null MD at [facility] OR [address] [place] [address]    TRANSPLANT-LUNG Bilateral 6/11/2014    Performed by Adolfo Huston MD at [facility] OR [address] [place] [address]       Review of patient's allergies indicates:   Allergen Reactions    Albuterol Palpitations    Colistin Anaphylaxis    Vancomycin analogues      Infusion reaction that does not resolve with slowing    Neupogen [filgrastim] Other (See Comments)     Ostealgia after five daily doses of 300 mcg.      Bactrim [sulfamethoxazole-trimethoprim] Hives    Ceftazidime Hives     Pt stated can tolerate cefapine not ceftazidime    Ceftazidime     Dronabinol Other (See Comments)     Mental changes/hallucinations    Haldol [haloperidol lactate] Other (See Comments)     Seizure like activity    Nsaids (non-steroidal anti-inflammatory drug)      Cannot have due to lung transplant    Adhesive Rash     Cloth tape- please use tegaderm or paper tape    Aztreonam Rash    Ciprofloxacin Nausea And Vomiting     Projectile N/V, per patient.  Unwilling to retry therapy.       Family History     Problem Relation (Age of Onset)    Drug abuse Maternal Grandfather    Thrombocytopenia Maternal Grandfather        Tobacco Use    Smoking status: Never Smoker    Smokeless tobacco: Never Used   Substance and Sexual Activity    Alcohol use: No     Comment: Has not had an alcoholic drink in more  than 2 months.    Drug use: No    Sexual activity: Yes     Partners: Male     Birth control/protection: Implant         Review of Systems   Constitutional: Positive for fatigue. Negative for activity change, appetite change, chills, diaphoresis, fever and unexpected weight change.   HENT: Positive for congestion and sore throat. Negative for drooling, ear discharge, ear pain, facial swelling, hearing loss and trouble swallowing.    Eyes: Negative for discharge and itching.   Respiratory: Positive for cough, chest tightness, shortness of breath and wheezing. Negative for apnea, choking and stridor.    Cardiovascular: Positive for chest pain. Negative for palpitations and leg swelling.   Gastrointestinal: Negative for abdominal distention, abdominal pain, blood in stool and constipation.   Endocrine: Negative.    Genitourinary: Negative for difficulty urinating and dysuria.   Musculoskeletal: Positive for back pain. Negative for arthralgias, gait problem, joint swelling and myalgias.   Skin: Negative for color change and pallor.   Neurological: Negative for dizziness and headaches.   Hematological: Negative for adenopathy.   Psychiatric/Behavioral: Negative for agitation, behavioral problems and confusion.     Objective:     Vital Signs (Most Recent):  Temp: 97.5 °F (36.4 °C) (12/04/18 2047)  Pulse: 104 (12/04/18 2201)  Resp: 19 (12/04/18 2201)  BP: 119/82 (12/04/18 2201)  SpO2: 97 % (12/04/18 2201) Vital Signs (24h Range):  Temp:  [97.5 °F (36.4 °C)] 97.5 °F (36.4 °C)  Pulse:  [] 104  Resp:  [14-20] 19  SpO2:  [94 %-98 %] 97 %  BP: (119-122)/(77-82) 119/82     Weight: 56.7 kg (125 lb)  Body mass index is 24.41 kg/m².    No intake or output data in the 24 hours ending 12/04/18 2224    Physical Exam   Constitutional: She is oriented to person, place, and time. She appears well-developed and well-nourished.   HENT:   Head: Normocephalic and atraumatic.   Mouth/Throat: No oropharyngeal exudate.   Eyes: EOM are  normal. Pupils are equal, round, and reactive to light. Right eye exhibits no discharge. No scleral icterus.   Neck: Normal range of motion. No thyromegaly present.   Cardiovascular: Normal rate and regular rhythm. Exam reveals no friction rub.   No murmur heard.  Pulmonary/Chest: Effort normal.   Expiratory wheeze bilaterally with scattered rhonchi more prominent on the R   Abdominal: Soft. She exhibits no distension. There is no tenderness. There is no guarding.   Musculoskeletal: She exhibits no edema or tenderness.   Neurological: She is alert and oriented to person, place, and time.   Skin: Skin is warm and dry.   Psychiatric: She has a normal mood and affect.       Vents:       Lines/Drains/Airways     Central Venous Catheter Line                 Port A Cath Single Lumen right subclavian -- days                Significant Labs:    CBC/Anemia Profile:  Recent Labs   Lab 12/04/18 2119   WBC 4.06   HGB 8.2*   HCT 28.1*   *   MCV 93   RDW 14.6*        Chemistries:  Recent Labs   Lab 12/04/18 2119      K 4.0      CO2 22*   BUN 26*   CREATININE 1.3   CALCIUM 8.8   ALBUMIN 3.8   PROT 7.1   BILITOT 0.3   ALKPHOS 156*   ALT 28   AST 21       All pertinent labs within the past 24 hours have been reviewed.    Significant Imaging:   I have reviewed all pertinent imaging results/findings within the past 24 hours.

## 2018-12-05 NOTE — HPI
Mrs. 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  Patient presents with chest congestion and reported hypoxia since Friday (11/30).  Patient reports she developed a dry cough and chest congestion on Friday that has slowly been progressing.  No significant sputum production.  No fever, chills at home.  No sick contacts.  Reported 1 day of sore throat but no myalgias, diarrhea.  Feels like she has a rattling in her chest.  Has a nebulizer at home but no neb medicine.  Started taking guaifenesin yesterday without meaningful improvement in sx.  Has chronic nausea that is no worse than baseline.   She developed some substernal, dull, achy chest pain without radiation yesterday.  Feels like it is related to her cough and it is aggravated by coughing.  She started wearing her oxygen again yesterday (had not been wearing it for several weeks - she wore it briefly nocturnally since her recent discharge on 11/14).  She came in on 2L and was increased to 4L in triage.  Weaned back down to 2L while speaking with her and tolerated it well.  Of note, she was admitted from 11/7 to 11/14 of this year w/ renal failure, AMS thought to be secondary to narcotics and possible meningitis.  She is presently at baseline mental status.  Denies any meaningful LE edema.  Reports urine is pale, normal frequency.  No hematemesis, hematochezia, melena.  Reports compliance with her Tacrolimus 1.5mg BID, Prednisone 5mg, Cellcept 500mg BID.

## 2018-12-05 NOTE — CONSULTS
Consult received. Full note to follow.    Please call for questions.    Thanks,  José Miguel Calvo MD  Infectious Disease Fellow, PGY-5  Pager: [phone] or ext: [phone]  [facility]

## 2018-12-05 NOTE — ASSESSMENT & PLAN NOTE
Continue outpatient regimen of prednisone 5 mg daily,  mg BID, and tacrolimus 2 mg in the AM, and 1.5 in PM. Will monitor daily tacrolimus levels and adjust dose as needed.

## 2018-12-05 NOTE — SUBJECTIVE & OBJECTIVE
Subjective:     Interval History: No acute events overnight. Patient continues to report chest congestion and pleuritic chest pain. She also reports vague abdominal pain as well as ongoing headaches and dyspnea with minimal exertion.     Continuous Infusions:  Scheduled Meds:   amLODIPine  10 mg Oral Daily    ARIPiprazole  2 mg Oral Daily    calcium-vitamin D3  1 tablet Oral BID WM    ceFEPime (MAXIPIME) IVPB  1 g Intravenous Q8H    cetirizine  5 mg Oral Daily    dapsone  100 mg Oral Daily    diphenhydrAMINE  50 mg Intravenous BID    enoxaparin  40 mg Subcutaneous Daily    fluticasone  1 spray Each Nare Daily    fluticasone-vilanterol  1 puff Inhalation Daily    folic acid  1,000 mcg Oral Daily    gabapentin  300 mg Oral TID    guaiFENesin  600 mg Oral BID    levalbuterol  1.25 mg Nebulization Q6H    lipase-protease-amylase 24,000-76,000-120,000 units  5 capsule Oral TID WM    magnesium oxide  400 mg Oral BID    metoprolol tartrate  25 mg Oral BID    montelukast  10 mg Oral Daily    morphine  15 mg Oral BID    multivit-min-FA-coenzyme Q10 100-5 mcg-mg  1 tablet Oral Daily    mycophenolate  500 mg Oral BID    oseltamivir  75 mg Oral BID    pantoprazole  40 mg Oral Daily    polyethylene glycol  17 g Oral BID    predniSONE  5 mg Oral Daily    tacrolimus  1.5 mg Oral Daily    tacrolimus  2 mg Oral Daily    topiramate  25 mg Oral BID    vancomycin (VANCOCIN) IVPB  750 mg Intravenous Q12H    venlafaxine  150 mg Oral QHS    venlafaxine  37.5 mg Oral QHS     PRN Meds:diphenhydrAMINE, LORazepam, ondansetron, oxyCODONE, tiZANidine    Review of patient's allergies indicates:   Allergen Reactions    Albuterol Palpitations    Colistin Anaphylaxis    Vancomycin analogues      Infusion reaction that does not resolve with slowing    Neupogen [filgrastim] Other (See Comments)     Ostealgia after five daily doses of 300 mcg.      Bactrim [sulfamethoxazole-trimethoprim] Hives    Ceftazidime Hives      Pt stated can tolerate cefapine not ceftazidime    Ceftazidime     Dronabinol Other (See Comments)     Mental changes/hallucinations    Haldol [haloperidol lactate] Other (See Comments)     Seizure like activity    Nsaids (non-steroidal anti-inflammatory drug)      Cannot have due to lung transplant    Adhesive Rash     Cloth tape- please use tegaderm or paper tape    Aztreonam Rash    Ciprofloxacin Nausea And Vomiting     Projectile N/V, per patient.  Unwilling to retry therapy.       Review of Systems   Constitutional: Positive for fatigue. Negative for activity change, appetite change, chills, diaphoresis, fever and unexpected weight change.   HENT: Positive for congestion and sore throat. Negative for drooling, ear discharge, ear pain, facial swelling, hearing loss and trouble swallowing.    Eyes: Negative for discharge and itching.   Respiratory: Positive for cough (dry), chest tightness, shortness of breath and wheezing. Negative for apnea, choking and stridor.    Cardiovascular: Positive for chest pain (pleuritic). Negative for palpitations and leg swelling.   Gastrointestinal: Positive for abdominal pain. Negative for abdominal distention, blood in stool and constipation.   Endocrine: Negative.    Genitourinary: Negative for difficulty urinating and dysuria.   Musculoskeletal: Positive for back pain. Negative for arthralgias, gait problem, joint swelling and myalgias.   Skin: Negative for color change and pallor.   Neurological: Negative for dizziness and headaches.   Hematological: Negative for adenopathy.   Psychiatric/Behavioral: Negative for agitation, behavioral problems and confusion.     Objective:   Physical Exam   Constitutional: She is oriented to person, place, and time. She appears well-developed and well-nourished.   HENT:   Head: Normocephalic and atraumatic.   Mouth/Throat: No oropharyngeal exudate.   Dry mucus membranes   Eyes: EOM are normal. Pupils are equal, round, and reactive  to light. Right eye exhibits no discharge. No scleral icterus.   Neck: Normal range of motion. No thyromegaly present.   Cardiovascular: Normal rate and regular rhythm. Exam reveals no friction rub.   No murmur heard.  Pulmonary/Chest: Effort normal. No stridor. No respiratory distress. She has wheezes (scant, expiratory). She has rhonchi (diffuse). She has no rales.   Abdominal: Soft. She exhibits no distension. There is no tenderness. There is no guarding.   Musculoskeletal: She exhibits no edema or tenderness.   Neurological: She is alert and oriented to person, place, and time.   Skin: Skin is warm and dry.   Psychiatric: She has a normal mood and affect.         Vital Signs (Most Recent):  Temp: 98.4 °F (36.9 °C) (12/05/18 1150)  Pulse: 92 (12/05/18 1348)  Resp: 16 (12/05/18 1348)  BP: 126/79 (12/05/18 1150)  SpO2: 100 % (12/05/18 1348) Vital Signs (24h Range):  Temp:  [97.5 °F (36.4 °C)-98.7 °F (37.1 °C)] 98.4 °F (36.9 °C)  Pulse:  [] 92  Resp:  [12-20] 16  SpO2:  [92 %-100 %] 100 %  BP: (110-136)/(66-82) 126/79     Weight: 56.7 kg (125 lb)  Body mass index is 24.41 kg/m².      Intake/Output Summary (Last 24 hours) at 12/5/2018 1536  Last data filed at 12/5/2018 1400  Gross per 24 hour   Intake 1000 ml   Output 950 ml   Net 50 ml       Significant Labs:  CBC:  Recent Labs   Lab 12/05/18 0407   WBC 4.23   RBC 2.79*   HGB 7.9*   HCT 25.9*   PLT 89*   MCV 93   MCH 28.3   MCHC 30.5*     BMP:  Recent Labs   Lab 12/05/18 0407      K 3.8      CO2 21*   BUN 22*   CREATININE 1.2   CALCIUM 8.8      Tacrolimus Levels:  Recent Labs   Lab 12/05/18 0407   TACROLIMUS 6.3     Microbiology:  Microbiology Results (last 7 days)     Procedure Component Value Units Date/Time    Blood Culture #1 **CANNOT BE ORDERED STAT** [[id number]] Collected:  12/04/18 2120    Order Status:  Completed Specimen:  Blood from Line, Subclavian, Right Updated:  12/05/18 0515     Blood Culture, Routine No Growth to date    Blood  culture [[id number]] Collected:  12/04/18 2151    Order Status:  Completed Specimen:  Blood from Peripheral, Antecubital, Right Updated:  12/05/18 0515     Blood Culture, Routine No Growth to date    Respiratory Viral Panel by PCR [facility]; Nasal Swab [[id number]] Collected:  12/05/18 0058    Order Status:  Sent Specimen:  Respiratory Updated:  12/05/18 0102    Culture, Respiratory with Gram Stain [[id number]]     Order Status:  No result Specimen:  Respiratory           I have reviewed all pertinent labs within the past 24 hours.    Diagnostic Results:  Labs: Reviewed  ECG: Reviewed  X-Ray: Reviewed  CT: Reviewed  Mild nodular ground-glass attenuation in the posterior segment of the right upper lobe, unchanged.  Appearance of the pulmonary parenchyma is overall back to baseline when compared to December 2016.  There is thickening of the posterior wall of the bronchus intermedius which is new since prior examination which could represent inflammation.  No large focal consolidation, pneumothorax, significant pleural thickening, or lung mass.  No pleural effusions.

## 2018-12-05 NOTE — ED NOTES
Assumed care of patient after receiving report from SUSAN Rojas. I acknowledge care for this patient. The patient is awake, alert, and cooperating with a calm affect. RR spontaneous, even, and unlabored, with regular effort and rate. Patient is in NAD, and resting calmly on stretcher with blood pressure cuff, pulse ox, and cardiac monitor attached. Bed locked in low position with side rails up x2 for safety, and call bell within reach. Patient is aware of POC, and has no complaints or concerns at this time. Will continue to monitor.

## 2018-12-05 NOTE — PLAN OF CARE
Unable to see patient during rounds, out of room. Will have full evaluation and recommendations in consult note tomorrow.     José Miguel Calvo MD  Infectious Disease Fellow, PGY-5  Pager: [phone] or ext: [phone]  [facility]

## 2018-12-05 NOTE — ED PROVIDER NOTES
Encounter Date: 12/4/2018       History     Chief Complaint   Patient presents with    Shortness of Breath     Presents to ED c/o congestion, SOB, and a constant, nonradiating dull/heavy type chest pain x 1 week that gets worse with coughing. Pt states she's had to use her home O2 at 3L when she normally doesn't need any O2 and sats in the high 90s. Hx yoana lung transplant. Denies fever/chills.      Juanita Ibarra is a 27 yo female who presents to the ED with shortness of breath and chest congestion. She reports symptoms began Friday and have progressively worsened. She has CF and had a bilateral lung transplant in 2014 followed here. She has home O2 but does not use it continuously. She began using her home O2 this morning at 3L with some relief. She also tried mucinex for her congestion with no relief. She contacted Dr. Morin with her symptoms and was told to come to the ED to be admitted. She also reports a dull achy substernal chest pain 7/10 that began this morning. The pain does not radiate. She complains of shortness of breath, non-productive cough, fatigue, dizziness, and nausea. She denies any fever, chills, vomiting, abdominal pain, diarrhea, or constipation.          Review of patient's allergies indicates:   Allergen Reactions    Albuterol Palpitations    Colistin Anaphylaxis    Vancomycin analogues      Infusion reaction that does not resolve with slowing    Neupogen [filgrastim] Other (See Comments)     Ostealgia after five daily doses of 300 mcg.      Bactrim [sulfamethoxazole-trimethoprim] Hives    Ceftazidime Hives     Pt stated can tolerate cefapine not ceftazidime    Ceftazidime     Dronabinol Other (See Comments)     Mental changes/hallucinations    Haldol [haloperidol lactate] Other (See Comments)     Seizure like activity    Nsaids (non-steroidal anti-inflammatory drug)      Cannot have due to lung transplant    Adhesive Rash     Cloth tape- please use tegaderm or paper  tape    Aztreonam Rash    Ciprofloxacin Nausea And Vomiting     Projectile N/V, per patient.  Unwilling to retry therapy.     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     Past Surgical History:   Procedure Laterality Date    ABDOMINAL SURGERY      peg tube     [id number] N/A 5/19/2015    [facility]    BIOPSY-BRONCHUS N/A 12/20/2016    Performed by Jake [facility]varez MD at [facility] OR 2ND FLR    BRONCHOSCOPY N/A 5/29/2018    Procedure: BRONCHOSCOPY; Bronchoscopy with BAL and transbronchial biopsies under general anesthesia;  Surgeon: Jake Alvarez MD;  [facility];  Service: Transplant;  Laterality: N/A;    BRONCHOSCOPY N/A 10/1/2018    Procedure: BRONCHOSCOPY;  Surgeon: Jake Alvarez MD;  [facility];  Service: Transplant;  Laterality: N/A;    BRONCHOSCOPY N/A 10/1/2018    Performed by Jake [facility]varez MD at [facility] OR 2ND FLR    BRONCHOSCOPY Bilateral 12/20/2016    Performed by [facility] at [facility] OR [facility]    BRONCHOSCOPY - flexible bronchoscopy with probable tissue biopsy CPT 85597 N/A 7/21/2015    Performed by Jake [facility]varez MD at [facility] OR 2ND [facility]LR    BRONCHOSCOPY - flexible bronchoscopy with probable tissue biospy CPT 10875 N/A 10/20/2015    Performed by Jake [facility]varez MD at [facility] OR 2ND FLR    BRONCHOSCOPY - flexible bronchoscopy with tissue biopsy CPT 81574 N/A 9/29/2015    Performed by [facility] at [facility] OR [facility]    BRONCHOSCOPY - flexible bronchoscopy with tissue biopsy CPT 67501 N/A  5/26/2015    Performed by Jake Alvarez MD at [facility] OR 1ST FLR    BRONCHOSCOPY flexible bronchoscopy with tissue biopsy N/A 9/8/2014    Performed by Jake Alvarez MD at [facility] OR 2ND [facility]LR    BRONCHOSCOPY flexible with possible tissue biopsy N/A 8/8/2014    Performed by Jake Alvarez MD at [facility] OR 2ND FLR    BRONCHOSCOPY, BAL, BIOPSIES N/A 3/20/2018    Performed by Jake Alvarez MD at [facility] OR 2ND FLR    BRONCHOSCOPY-FIBEROPTIC N/A 1/29/2016    Performed by Joann Cifuentes DO at [facility] OR 2ND FLR    BRONCHOSCOPY; Bronchoscopy with BAL and transbronchial biopsies under general anesthesia N/A 5/29/2018    [facility]    CHOLECYSTECTOMY  2016    CHOLECYSTECTOMY-LAPAROSCOPIC N/A 7/22/2016    Performed by Joshua Goldberg, MD at [facility] OR 2ND FLR    ENDOMETRIAL ABLATION  2015    KJB    FESS      FESS Bilateral 10/21/2013    Performed by Jared Whatley MD at [facility] OR [address] [place] [address]    FINE NEEDLE ASPIRATION (FNA)  of a cervical lymphadenopathy  1/29/2016    Performed by Joann Cifuentes DO at [facility] OR 2ND FLR    flex bronchoscopy with probable tissue biopsy N/A 7/31/2014    Performed by [facility] Diagnostic Provider at [facility] OR 2ND FLR    flexible bronchoscopy with tissue biopsy N/A 12/11/2014    Performed by Jake Alvarez MD at [facility] OR [address] [place] [address]    INJECTION-VOCAL CORD Left 6/24/2014    Performed by Jared Whatley MD at [facility] OR [address] [place] [address]    [id number]-VAHB-T-CATH to right chest and removal of portacath to left chests wall. Bilateral 6/24/2014    Performed by Zhen Dorado MD at [facility] OR 2ND FLR    LARYNX SURGERY  2016    LUNG TRANSPLANT Bilateral 6/12/14    MYRINGOTOMY W/ TUBES Right 04/2017    MYRINGOTOMY WITH INSERTION OF PE TUBES Right 4/15/2017    Performed by Gary Null MD at [facility] OR 2ND FLR    PLACEMENT-TUBE-PEG  5/15/2014    [facility]    PORTACATH PLACEMENT Right     rt sc     REMOVAL-TUBE-PEG  5/15/2014    Performed by [facility] at [facility] OR 2ND FLR    ROBOTIC ASSISTED LAPAROSCOPIC HYSTERECTOMY N/A 4/25/2017    Performed by [facility]y [facility] Jr[facility] at [facility] OR    SALPINGECTOMY Bilateral 2015    K[facility]    SALPINGECTOMY-LAPAROSCOPIC Bilateral 5/19/2015    Performed by [facility]y [facility] Jr[facility] at [facility] OR    SINUS SURGERY FUNCTIONAL ENDOSCOPIC WITH NAVIGATION Bilateral 4/3/2017    [facility]    SINUS SURGERY FUNCTIONAL ENDOSCOPIC WITH NAVIGATION, 50589, 59306, 06496, 55382 Bilateral 2/5/2015    Performed by Cesar Olsen MD at [facility] OR [address] [place] [address]    THYROPLASTY - Medialization laryngoplasty, cricothyroid subluxation, arytenoid repositioning Left 8/2/2016    Performed by Gary Null MD at [facility] OR [address] [place] [address]    TRANSPLANT-LUNG Bilateral 6/11/2014    Performed by Adolfo Huston MD at [facility] OR [address] [place] [address]     Family History   Problem Relation Age of Onset    Thrombocytopenia Maternal Grandfather     Drug abuse Maternal Grandfather     Breast cancer Neg Hx     Colon cancer Neg Hx     Ovarian cancer Neg Hx      Social History     Tobacco Use    Smoking status: Never Smoker    Smokeless tobacco: Never Used   Substance Use Topics    Alcohol use: No     Comment: Has not had an alcoholic drink in more than 2 months.    Drug use: No     Review of Systems   Constitutional: Positive for fatigue. Negative for chills and fever.   Respiratory: Positive for cough, chest tightness, shortness of breath and wheezing.    Cardiovascular: Positive for chest pain. Negative for palpitations and leg swelling.   Gastrointestinal: Positive for nausea. Negative for abdominal distention, abdominal pain, blood in stool, constipation, diarrhea and vomiting.   Genitourinary: Negative for dysuria and hematuria.   Musculoskeletal: Positive for neck stiffness.   Neurological: Positive for dizziness, weakness and headaches.       Physical Exam     Initial Vitals  [12/04/18 2047]   BP Pulse Resp Temp SpO2   122/77 97 20 97.5 °F (36.4 °C) (!) 94 %      MAP       --         Physical Exam    Nursing note and vitals reviewed.  Constitutional: She appears well-developed and well-nourished.   Cardiovascular: Normal rate, regular rhythm, normal heart sounds and intact distal pulses.   Pulmonary/Chest: She exhibits no tenderness.   Coarse breath sounds with diffuse wheezing and rhonchi   Abdominal: Soft. Bowel sounds are normal. She exhibits no distension. There is no tenderness. There is no rebound.   Neurological: She is alert and oriented to person, place, and time.   Skin: Skin is warm and dry.   Psychiatric: She has a normal mood and affect. Thought content normal.         ED Course   Procedures  Labs Reviewed   CULTURE, BLOOD   CULTURE, BLOOD   CBC W/ AUTO DIFFERENTIAL   COMPREHENSIVE METABOLIC PANEL   LACTIC ACID, PLASMA   TACROLIMUS LEVEL          Imaging Results    None          Medical Decision Making:   Differential Diagnosis:   Pneumonia, URI, bronchiolitis  ED Management:  28-year-old female with bilateral lung transplant a few years ago.  Patient was instructed to come to the ER by her pulmonologist.  Plan:  Routine labs and chest x-ray, lung transplant contacted and they will admit the patient.                       Clinical Impression:   Diagnoses of SOB (shortness of breath) and Cough were pertinent to this visit.      Disposition:   Disposition: Admitted  Condition: Stable                        Jake Gomez PA-C  12/04/18 2152

## 2018-12-05 NOTE — ED TRIAGE NOTES
Juanita Ibarra, a 28 y.o. female presents to the ED bed 2    Triage note:  Chief Complaint   Patient presents with    Shortness of Breath     Presents to ED c/o congestion, SOB, and a constant, nonradiating dull/heavy type chest pain x 1 week that gets worse with coughing. Pt states she's had to use her home O2 at 3L when she normally doesn't need any O2 and sats in the high 90s. Hx yoana lung transplant. Denies fever/chills.      Pt had bilateral lung transplant in 2014 and cystic fibrosis. She states she has been SOB with cough/congestion since Friday. She placed herself on 3 liters [place] at home to help (she states she uses oxygen only when needed; not continuous). She states dry cough but feels like she needs to cough up sputum. She denies any fevers at at home. She c/o some chest discomfort described as dull/sore but thinks its from coughing so much. She also c/o HA which is chronic. She states she is compliant with immunosuppressant meds for transplants. She states recent LISBETH and had dialysis once a few weeks ago.          Review of patient's allergies indicates:   Allergen Reactions    Albuterol Palpitations    Colistin Anaphylaxis    Vancomycin analogues      Infusion reaction that does not resolve with slowing    Neupogen [filgrastim] Other (See Comments)     Ostealgia after five daily doses of 300 mcg.      Bactrim [sulfamethoxazole-trimethoprim] Hives    Ceftazidime Hives     Pt stated can tolerate cefapine not ceftazidime    Ceftazidime     Dronabinol Other (See Comments)     Mental changes/hallucinations    Haldol [haloperidol lactate] Other (See Comments)     Seizure like activity    Nsaids (non-steroidal anti-inflammatory drug)      Cannot have due to lung transplant    Adhesive Rash     Cloth tape- please use tegaderm or paper tape    Aztreonam Rash    Ciprofloxacin Nausea And Vomiting     Projectile N/V, per patient.  Unwilling to retry therapy.     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

## 2018-12-05 NOTE — ASSESSMENT & PLAN NOTE
Patient with persistent decline on outpatient PFTs. Concerns for progression of CRALOS EDUARDO versus infectious etiology. Will continue oxygen supplementation to maintain O2 sats >88%.

## 2018-12-05 NOTE — ASSESSMENT & PLAN NOTE
FEV1 with persistent decline on outpatient PFTs.  Was not wearing home O2 prior to recent chest congestion.  Wean O2 for sats > 88%.

## 2018-12-05 NOTE — ASSESSMENT & PLAN NOTE
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 Concerns for progression of her CARLOS EDUARDO versus infectious etiology. RVP, sputum culture ordered. Procalcitonin negative. CXR without new infiltrates. Troponin and BNP unremarkable. CT chest 12/5 - GGOs relatively unchanged from prior examination. TTE on 12/5 with EF of 55%, mild left atrial enlargement, PAP 26, mild tricuspid regurg, normal LV/RV function.      Continue immunosuppression and ppx. Continue oxygen supplementation to maintain goal O2 sats >88%. Continue empiric tamiflu/Cefepime/Vanc. Will follow up on cultures. ID consulted, appreciate recs.

## 2018-12-05 NOTE — H&P
[facility]  Pulmonology  H&P    Patient Name: Juanita Ibarra  MRN: [id number]  Admission Date: 12/4/2018  Code Status: Full Code  Primary Care Provider: Jake Alvarez MD   Principal Problem: <principal problem not specified>    Subjective:     HPI:  Mrs. 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  Patient presents with chest congestion and reported hypoxia since Friday (11/30).  Patient reports she developed a dry cough and chest congestion on Friday that has slowly been progressing.  No significant sputum production.  No fever, chills at home.  No sick contacts.  Reported 1 day of sore throat but no myalgias, diarrhea.  Feels like she has a rattling in her chest.  Has a nebulizer at home but no neb medicine.  Started taking guaifenesin yesterday without meaningful improvement in sx.  Has chronic nausea that is no worse than baseline.   She developed some substernal, dull, achy chest pain without radiation yesterday.  Feels like it is related to her cough and it is aggravated by coughing.  She started wearing her oxygen again yesterday (had not been wearing it for several weeks - she wore it briefly nocturnally since her recent discharge on 11/14).  She came in on 2L and was increased to 4L in triage.  Weaned back down to 2L while speaking with her and tolerated it well.  Of note, she was admitted from 11/7 to 11/14 of this year w/ renal failure, AMS thought to be secondary to narcotics and possible meningitis.  She is presently at baseline mental status.  Denies any meaningful LE edema.  Reports urine is pale, normal frequency.  No hematemesis, hematochezia, melena.  Reports compliance with her Tacrolimus 1.5mg BID, Prednisone 5mg, Cellcept 500mg BID.        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       Past Surgical History:   Procedure Laterality Date    ABDOMINAL SURGERY      peg tube     [id number] N/A 5/19/2015    Performed by Deny Dias Jr., [facility] at [facility] OR    BIOPSY-BRONCHUS N/A 12/20/2016    Performed by Jake Alvarez MD at [facility] OR 2ND FLR    BRONCHOSCOPY N/A 5/29/2018    Procedure: BRONCHOSCOPY; Bronchoscopy with BAL and transbronchial biopsies under general anesthesia;  Surgeon: Jake Alvarez MD;  [facility];  Service: Transplant;  Laterality: N/A;    BRONCHOSCOPY N/A 10/1/2018    Procedure: BRONCHOSCOPY;  Surgeon: Jake Alvarez MD;  [facility];  Service: Transplant;  Laterality: N/A;    BRONCHOSCOPY N/A 10/1/2018    Performed by Jake Alvarez MD at [facility] OR 2ND FLR    BRONCHOSCOPY Bilateral 12/20/2016    Performed by Jake [facility]varez MD at [facility] OR 2ND [facility]LR    BRONCHOSCOPY - flexible bronchoscopy with probable tissue biopsy CPT 42435 N/A 7/21/2015    Performed by Jake [facility]varez MD at [facility] OR 2ND [facility]LR    BRONCHOSCOPY - flexible bronchoscopy with probable tissue biospy CPT 07408 N/A 10/20/2015    Performed by Jake [facility]varez MD at [facility] OR 2ND [facility]LR    BRONCHOSCOPY - flexible bronchoscopy with tissue biopsy CPT 96867 N/A 9/29/2015    Performed by Jake Alvarez MD at [facility] OR 2ND FLR    BRONCHOSCOPY - flexible bronchoscopy with tissue biopsy CPT 82861 N/A 5/26/2015    Performed by Jake Alvarez MD at [facility] OR 1ST FLR    BRONCHOSCOPY flexible bronchoscopy with tissue biopsy N/A 9/8/2014    Performed by  Jake Alvarez MD at [facility] OR [address] [place] [address]    BRONCHOSCOPY flexible with possible tissue biopsy N/A 8/8/2014    Performed by Jake Alvarez MD at [facility] OR [address] [place] [address]    BRONCHOSCOPY, BAL, BIOPSIES N/A 3/20/2018    Performed by Jake Alvarez MD at [facility] OR [address] [place] [address]    BRONCHOSCOPY-FIBEROPTIC N/A 1/29/2016    Performed by Joann Cifuentes DO at [facility] OR [address] [place] [address]    BRONCHOSCOPY; Bronchoscopy with BAL and transbronchial biopsies under general anesthesia N/A 5/29/2018    Performed by Jake Alvarez MD at [facility] OR [address] [place] [address]    CHOLECYSTECTOMY  2016    CHOLECYSTECTOMY-LAPAROSCOPIC N/A 7/22/2016    Performed by Joshua Goldberg, MD at [facility] OR [address] [place] [address]    ENDOMETRIAL ABLATION  2015    KJB    FESS      FESS Bilateral 10/21/2013    Performed by Jared Whatley MD at [facility] OR [address] [place] [address]    FINE NEEDLE ASPIRATION (FNA)  of a cervical lymphadenopathy  1/29/2016    Performed by Joann Cifuentes DO at [facility] OR [address] [place] [address]    flex bronchoscopy with probable tissue biopsy N/A 7/31/2014    Performed by [facility] at [facility] OR [address] [place] [address]    flexible bronchoscopy with tissue biopsy N/A 12/11/2014    Performed by Jake Alvarez MD at [facility] OR [address] [place] [address]    INJECTION-VOCAL CORD Left 6/24/2014    Performed by Jared Whatley MD at [facility] OR [address] [place] [address]    [id number]-U-CATH to right chest and removal of portacath to left chests wall. Bilateral 6/24/2014    Performed by Zhen Dorado MD at [facility] OR [address] [place] [address]    LARYNX SURGERY  2016    LUNG TRANSPLANT Bilateral 6/12/14    MYRINGOTOMY W/ TUBES Right 04/2017    MYRINGOTOMY WITH INSERTION OF PE TUBES Right 4/15/2017    [facility]    PLACEMENT-TUBE-PEG  5/15/2014    [facility]    PORTACATH PLACEMENT Right     rt sc    REMOVAL-TUBE-PEG  5/15/2014    Performed by David Moses MD at [facility] OR [address] [place] [address]    ROBOTIC ASSISTED LAPAROSCOPIC HYSTERECTOMY N/A 4/25/2017    Performed by  Deny Dias Jr., MD at [facility] OR    SALPINGECTOMY Bilateral 2015    KJB    SALPINGECTOMY-LAPAROSCOPIC Bilateral 5/19/2015    Performed by Deny Dias Jr., MD at [facility] OR    SINUS SURGERY FUNCTIONAL ENDOSCOPIC WITH NAVIGATION Bilateral 4/3/2017    Performed by Cesar Olsen MD at [facility] OR [facility]    SINUS SURGERY FUNCTIONAL ENDOSCOPIC WITH NAVIGATION, 99845, 12716, 87985, 13554 Bilateral 2/5/2015    [facility]    THYROPLASTY - Medialization laryngoplasty, cricothyroid subluxation, arytenoid repositioning Left 8/2/2016    [facility]    TRANSPLANT-LUNG Bilateral 6/11/2014    Performed by Adolfo Huston MD at [facility] OR [address] [place] [address]       Review of patient's allergies indicates:   Allergen Reactions    Albuterol Palpitations    Colistin Anaphylaxis    Vancomycin analogues      Infusion reaction that does not resolve with slowing    Neupogen [filgrastim] Other (See Comments)     Ostealgia after five daily doses of 300 mcg.      Bactrim [sulfamethoxazole-trimethoprim] Hives    Ceftazidime Hives     Pt stated can tolerate cefapine not ceftazidime    Ceftazidime     Dronabinol Other (See Comments)     Mental changes/hallucinations    Haldol [haloperidol lactate] Other (See Comments)     Seizure like activity    Nsaids (non-steroidal anti-inflammatory drug)      Cannot have due to lung transplant    Adhesive Rash     Cloth tape- please use tegaderm or paper tape    Aztreonam Rash    Ciprofloxacin Nausea And Vomiting     Projectile N/V, per patient.  Unwilling to retry therapy.       Family History     Problem Relation (Age of Onset)    Drug abuse Maternal Grandfather    Thrombocytopenia Maternal Grandfather        Tobacco Use    Smoking status: Never Smoker    Smokeless tobacco: Never Used   Substance and Sexual Activity    Alcohol use: No     Comment: Has not had an alcoholic drink in more than 2 months.    Drug  use: No    Sexual activity: Yes     Partners: Male     Birth control/protection: Implant         Review of Systems   Constitutional: Positive for fatigue. Negative for activity change, appetite change, chills, diaphoresis, fever and unexpected weight change.   HENT: Positive for congestion and sore throat. Negative for drooling, ear discharge, ear pain, facial swelling, hearing loss and trouble swallowing.    Eyes: Negative for discharge and itching.   Respiratory: Positive for cough, chest tightness, shortness of breath and wheezing. Negative for apnea, choking and stridor.    Cardiovascular: Positive for chest pain. Negative for palpitations and leg swelling.   Gastrointestinal: Negative for abdominal distention, abdominal pain, blood in stool and constipation.   Endocrine: Negative.    Genitourinary: Negative for difficulty urinating and dysuria.   Musculoskeletal: Positive for back pain. Negative for arthralgias, gait problem, joint swelling and myalgias.   Skin: Negative for color change and pallor.   Neurological: Negative for dizziness and headaches.   Hematological: Negative for adenopathy.   Psychiatric/Behavioral: Negative for agitation, behavioral problems and confusion.     Objective:     Vital Signs (Most Recent):  Temp: 97.5 °F (36.4 °C) (12/04/18 2047)  Pulse: 104 (12/04/18 2201)  Resp: 19 (12/04/18 2201)  BP: 119/82 (12/04/18 2201)  SpO2: 97 % (12/04/18 2201) Vital Signs (24h Range):  Temp:  [97.5 °F (36.4 °C)] 97.5 °F (36.4 °C)  Pulse:  [] 104  Resp:  [14-20] 19  SpO2:  [94 %-98 %] 97 %  BP: (119-122)/(77-82) 119/82     Weight: 56.7 kg (125 lb)  Body mass index is 24.41 kg/m².    No intake or output data in the 24 hours ending 12/04/18 2224    Physical Exam   Constitutional: She is oriented to person, place, and time. She appears well-developed and well-nourished.   HENT:   Head: Normocephalic and atraumatic.   Mouth/Throat: No oropharyngeal exudate.   Eyes: EOM are normal. Pupils are equal,  round, and reactive to light. Right eye exhibits no discharge. No scleral icterus.   Neck: Normal range of motion. No thyromegaly present.   Cardiovascular: Normal rate and regular rhythm. Exam reveals no friction rub.   No murmur heard.  Pulmonary/Chest: Effort normal.   Expiratory wheeze bilaterally with scattered rhonchi more prominent on the R   Abdominal: Soft. She exhibits no distension. There is no tenderness. There is no guarding.   Musculoskeletal: She exhibits no edema or tenderness.   Neurological: She is alert and oriented to person, place, and time.   Skin: Skin is warm and dry.   Psychiatric: She has a normal mood and affect.       Vents:       Lines/Drains/Airways     Central Venous Catheter Line                 Port A Cath Single Lumen right subclavian -- days                Significant Labs:    CBC/Anemia Profile:  Recent Labs   Lab 12/04/18 2119   WBC 4.06   HGB 8.2*   HCT 28.1*   *   MCV 93   RDW 14.6*        Chemistries:  Recent Labs   Lab 12/04/18 2119      K 4.0      CO2 22*   BUN 26*   CREATININE 1.3   CALCIUM 8.8   ALBUMIN 3.8   PROT 7.1   BILITOT 0.3   ALKPHOS 156*   ALT 28   AST 21       All pertinent labs within the past 24 hours have been reviewed.    Significant Imaging:   I have reviewed all pertinent imaging results/findings within the past 24 hours.    Assessment/Plan:     Thrombocytopenia    Chronic.  Will monitor.  No evidence of bleeding.     SOB (shortness of breath)    Dyspnea with chest congestion since Friday.  Suspect viral bronchitis.  Little by history to suggest influenza, will not empirically treat for now.  Checking RVP, sputum culture.  No leukocytosis, fever, no clear infiltrate on CXR - will hold off on abx for now.  Checking procalcitnonin.  Low threshold to start abx for any clinical worsening.  Xopenex nebs (reported palpitations w/ albuterol) and continue expectorants for chest congestion.  Wean O2 for sats > 88%.  She does have some [place]  depression in her inferior leads which is strange - ? Pericarditis though the EKG findings aren't as global as I would expect, no obvious ST elevations or depressions - will check trop, BNP.  Repeat EKG.     Acute kidney failure    Cr improved from most recent check (1.3 from 1.6).  BUN mildly elevated.  Encourage PO intake.     Bronchiolitis obliterans syndrome    FEV1 with persistent decline on outpatient PFTs.  Was not wearing home O2 prior to recent chest congestion.  Wean O2 for sats > 88%.     Prophylactic antibiotic    Continue Dapsone.     Lung transplant status, bilateral    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      Continue immunosuppression and ppx.  Daily tacrolimus levels.     Immunosuppression    Continue home Tacrolimus 1.5mg BID, Cellcept 500mg BID, Prednisone 5mg.  Daily Tacrolimus levels.     Chronic pain with opiate use    Continue home pain regimen.     CF related Pancreatic insufficiency    Continue Creon w/ meals and snacks.     Anxiety disorder    Continue home Effexor (dose recently changed to 187.5 mg QHS) and Abilify with PRN Ativan.          Chicho Drew MD  Pulmonology  [facility]

## 2018-12-05 NOTE — ASSESSMENT & PLAN NOTE
Patient with persistent decline on outpatient PFTs. Concerns for progression of CARLOS EDUARDO versus infectious etiology. Will continue oxygen supplementation to maintain O2 sats >88%.

## 2018-12-05 NOTE — ASSESSMENT & PLAN NOTE
LFTs noted to be >3 x ULN on admission. Hepatitis panel and liver US unremarkable. Likely CF and medication related given her recent history of headaches and tylenol/fioricet use. Improving since admission, but will continue to trend.

## 2018-12-06 NOTE — PLAN OF CARE
Problem: Patient Care Overview  Goal: Plan of Care Review  - Patient is AAOx4, independent and ambulatory. Patient educated to use call bell for assistance, verbalized understanding.   - Afebrile, WBC 2.63 - continuing IV cefepime and vanc - vanc trough due before PM dose tonight. Sputum culture collected this morning.   - Patient is on 2 L O2 NC for comfort - sats > 96 %   - CR 1.2 - urine output 650 cc (measured), 2 unmeasured occurrences   - Patient switched to regular diet, eating majority of meals   - Patient complained of pain twice so far this shift, PRN oxycodone admin per order   - Patient complained of a headache once this AM relieved with PRN Fioricet   - Patient complained of nausea relieved with PRN phenergan   - Patient complained of anxiety relieved with PRN ativan   - See flow sheet for complete assessment details

## 2018-12-06 NOTE — PROGRESS NOTES
[facility]  Lung Transplant  Progress Note - Floor    Patient Name: Juanita Ibarra  MRN: [id number]  Admission Date: 12/4/2018  Hospital Length of Stay: 2 days  Post-Operative Day: 1638  Attending Physician: Jake Alvarez MD  Primary Care Provider: Jake Alvarez MD     Subjective:     Interval History: No acute events overnight. Patient states cough is becoming more productive and provided sputum sample on rounds. Continues to complain of intermittent headaches and dyspnea with minimal exertion. She also notes increased congestion since admission.     Continuous Infusions:  Scheduled Meds:   amLODIPine  10 mg Oral Daily    ARIPiprazole  2 mg Oral Daily    calcium-vitamin D3  1 tablet Oral BID WM    ceFEPime (MAXIPIME) IVPB  1 g Intravenous Q8H    cetirizine  5 mg Oral Daily    dapsone  100 mg Oral Daily    diphenhydrAMINE  50 mg Intravenous BID    enoxaparin  40 mg Subcutaneous Daily    fluticasone  1 spray Each Nare Daily    fluticasone-vilanterol  1 puff Inhalation Daily    folic acid  1,000 mcg Oral Daily    gabapentin  300 mg Oral TID    guaiFENesin  600 mg Oral BID    levalbuterol  1.25 mg Nebulization Q6H    lipase-protease-amylase 24,000-76,000-120,000 units  5 capsule Oral TID WM    magnesium oxide  400 mg Oral BID    metoprolol tartrate  25 mg Oral BID    montelukast  10 mg Oral Daily    morphine  15 mg Oral BID    multivit-min-FA-coenzyme Q10 100-5 mcg-mg  1 tablet Oral Daily    mycophenolate  500 mg Oral BID    oseltamivir  75 mg Oral BID    pantoprazole  40 mg Oral Daily    polyethylene glycol  17 g Oral BID    predniSONE  5 mg Oral Daily    tacrolimus  1.5 mg Oral Daily    tacrolimus  2 mg Oral Daily    topiramate  25 mg Oral BID    vancomycin (VANCOCIN) IVPB  750 mg Intravenous Q12H    venlafaxine  150 mg Oral QHS    venlafaxine  37.5 mg Oral QHS     PRN Meds:butalbital-acetaminophen-caffeine -40 mg, diphenhydrAMINE, LORazepam,  ondansetron, oxyCODONE, tiZANidine    Review of patient's allergies indicates:   Allergen Reactions    Albuterol Palpitations    Colistin Anaphylaxis    Vancomycin analogues      Infusion reaction that does not resolve with slowing    Neupogen [filgrastim] Other (See Comments)     Ostealgia after five daily doses of 300 mcg.      Bactrim [sulfamethoxazole-trimethoprim] Hives    Ceftazidime Hives     Pt stated can tolerate cefapine not ceftazidime    Ceftazidime     Dronabinol Other (See Comments)     Mental changes/hallucinations    Haldol [haloperidol lactate] Other (See Comments)     Seizure like activity    Nsaids (non-steroidal anti-inflammatory drug)      Cannot have due to lung transplant    Adhesive Rash     Cloth tape- please use tegaderm or paper tape    Aztreonam Rash    Ciprofloxacin Nausea And Vomiting     Projectile N/V, per patient.  Unwilling to retry therapy.       Review of Systems   Constitutional: Positive for fatigue. Negative for activity change, appetite change, chills, diaphoresis, fever and unexpected weight change.   HENT: Positive for congestion. Negative for drooling, ear discharge, ear pain, facial swelling, hearing loss, sore throat and trouble swallowing.    Eyes: Negative for discharge and itching.   Respiratory: Positive for cough (intermittently productive), chest tightness, shortness of breath and wheezing. Negative for apnea, choking and stridor.    Cardiovascular: Positive for chest pain (pleuritic). Negative for palpitations and leg swelling.   Gastrointestinal: Negative for abdominal distention, abdominal pain, blood in stool and constipation.   Endocrine: Negative.    Genitourinary: Negative for difficulty urinating and dysuria.   Musculoskeletal: Positive for back pain. Negative for arthralgias, gait problem, joint swelling and myalgias.   Skin: Negative for color change and pallor.   Neurological: Negative for dizziness and headaches.   Hematological: Negative  for adenopathy.   Psychiatric/Behavioral: Negative for agitation, behavioral problems and confusion.     Objective:   Physical Exam   Constitutional: She is oriented to person, place, and time. She appears well-developed and well-nourished.   HENT:   Head: Normocephalic and atraumatic.   Mouth/Throat: No oropharyngeal exudate.   Dry mucus membranes   Eyes: EOM are normal. Pupils are equal, round, and reactive to light. Right eye exhibits no discharge. No scleral icterus.   Neck: Normal range of motion. No thyromegaly present.   Cardiovascular: Normal rate and regular rhythm. Exam reveals no friction rub.   No murmur heard.  Pulmonary/Chest: Effort normal. No stridor. No respiratory distress. She has wheezes (scant, expiratory). She has rhonchi (diffuse). She has no rales.   Abdominal: Soft. She exhibits no distension. There is no tenderness. There is no guarding.   Musculoskeletal: She exhibits no edema or tenderness.   Neurological: She is alert and oriented to person, place, and time.   Skin: Skin is warm and dry.   Psychiatric: She has a normal mood and affect.         Vital Signs (Most Recent):  Temp: 98.4 °F (36.9 °C) (12/06/18 0829)  Pulse: 92 (12/06/18 0848)  Resp: 18 (12/06/18 0848)  BP: 110/69 (12/06/18 0829)  SpO2: 96 % (12/06/18 0829) Vital Signs (24h Range):  Temp:  [98 °F (36.7 °C)-98.7 °F (37.1 °C)] 98.4 °F (36.9 °C)  Pulse:  [60-92] 92  Resp:  [14-20] 18  SpO2:  [93 %-100 %] 96 %  BP: (102-126)/(63-79) 110/69     Weight: 55.1 kg (121 lb 7.6 oz)  Body mass index is 23.72 kg/m².      Intake/Output Summary (Last 24 hours) at 12/6/2018 1049  Last data filed at 12/6/2018 0800  Gross per 24 hour   Intake 2600 ml   Output 3500 ml   Net -900 ml       Significant Labs:  CBC:  Recent Labs   Lab 12/06/18  0500   WBC 2.63*   RBC 2.66*   HGB 7.4*   HCT 25.3*   PLT 82*   MCV 95   MCH 27.8   MCHC 29.2*     BMP:  Recent Labs   Lab 12/06/18  0500      K 4.1      CO2 25   BUN 18   CREATININE 1.3   CALCIUM  8.6*      Tacrolimus Levels:  Recent Labs   Lab 12/06/18  0500   TACROLIMUS 4.6*     Microbiology:  Microbiology Results (last 7 days)     Procedure Component Value Units Date/Time    Culture, Respiratory with Gram Stain [[id number]] Collected:  12/06/18 0924    Order Status:  Sent Specimen:  Respiratory from Sputum, Expectorated Updated:  12/06/18 1041    Blood culture [[id number]] Collected:  12/04/18 2151    Order Status:  Completed Specimen:  Blood from Peripheral, Antecubital, Right Updated:  12/05/18 2312     Blood Culture, Routine No Growth to date     Blood Culture, Routine No Growth to date    Blood Culture #1 **CANNOT BE ORDERED STAT** [[id number]] Collected:  12/04/18 2120    Order Status:  Completed Specimen:  Blood from Line, Subclavian, Right Updated:  12/05/18 2212     Blood Culture, Routine No Growth to date     Blood Culture, Routine No Growth to date    Respiratory Viral Panel by PCR [facility]; Nasal Swab [[id number]] Collected:  12/05/18 0058    Order Status:  Sent Specimen:  Respiratory Updated:  12/05/18 0102          I have reviewed all pertinent labs within the past 24 hours.    Diagnostic Results:  Labs: Reviewed  ECG: Reviewed  X-Ray: Reviewed  CT: Reviewed  Mild nodular ground-glass attenuation in the posterior segment of the right upper lobe, unchanged.  Appearance of the pulmonary parenchyma is overall back to baseline when compared to December 2016.  There is thickening of the posterior wall of the bronchus intermedius which is new since prior examination which could represent inflammation.  No large focal consolidation, pneumothorax, significant pleural thickening, or lung mass.  No pleural effusions.    Assessment/Plan:     Complication of lung transplant    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 Concerns for progression of her CARLOS EDUARDO versus  infectious etiology. RVP, sputum culture ordered. Procalcitonin negative. CXR without new infiltrates. Troponin and BNP unremarkable. CT chest 12/5 - GGOs relatively unchanged from prior examination. TTE on 12/5 with EF of 55%, mild left atrial enlargement, PAP 26, mild tricuspid regurg, normal LV/RV function.      Continue immunosuppression and ppx. Continue oxygen supplementation to maintain goal O2 sats >88%. Continue empiric tamiflu/Cefepime/Vanc. Will follow up on cultures. ID consulted, appreciate recs.      Immunosuppression    Continue outpatient regimen of prednisone 5 mg daily,  mg BID, and tacrolimus 2 mg in the AM, and 1.5 in PM. Will monitor daily tacrolimus levels and adjust dose as needed.      Prophylactic antibiotic    Continue dapsone 100 mg daily.      Bronchiolitis obliterans syndrome    Patient with persistent decline on outpatient PFTs. Concerns for progression of CARLOS EDUARDO versus infectious etiology. Will continue oxygen supplementation to maintain O2 sats >88%.      Anxiety disorder    Continue current outpatient regimen with Effexor, Abilify, and ativan.      Chronic pain with opiate use    Continue current outpatient pain regimen.      CF related Pancreatic insufficiency    Continue creon with meals and snacks.      Transaminitis    LFTs noted to be >3 x ULN on admission. Hepatitis panel and liver US unremarkable. Likely CF and medication related given her recent history of headaches and tylenol/fioricet use. Improving since admission, but will continue to trend.          Shama Canales PA-C  Lung Transplant  [facility]

## 2018-12-06 NOTE — SUBJECTIVE & OBJECTIVE
Subjective:     Interval History: No acute events overnight. Patient states cough is becoming more productive and provided sputum sample on rounds. Continues to complain of intermittent headaches and dyspnea with minimal exertion. She also notes increased congestion since admission.     Continuous Infusions:  Scheduled Meds:   amLODIPine  10 mg Oral Daily    ARIPiprazole  2 mg Oral Daily    calcium-vitamin D3  1 tablet Oral BID WM    ceFEPime (MAXIPIME) IVPB  1 g Intravenous Q8H    cetirizine  5 mg Oral Daily    dapsone  100 mg Oral Daily    diphenhydrAMINE  50 mg Intravenous BID    enoxaparin  40 mg Subcutaneous Daily    fluticasone  1 spray Each Nare Daily    fluticasone-vilanterol  1 puff Inhalation Daily    folic acid  1,000 mcg Oral Daily    gabapentin  300 mg Oral TID    guaiFENesin  600 mg Oral BID    levalbuterol  1.25 mg Nebulization Q6H    lipase-protease-amylase 24,000-76,000-120,000 units  5 capsule Oral TID WM    magnesium oxide  400 mg Oral BID    metoprolol tartrate  25 mg Oral BID    montelukast  10 mg Oral Daily    morphine  15 mg Oral BID    multivit-min-FA-coenzyme Q10 100-5 mcg-mg  1 tablet Oral Daily    mycophenolate  500 mg Oral BID    oseltamivir  75 mg Oral BID    pantoprazole  40 mg Oral Daily    polyethylene glycol  17 g Oral BID    predniSONE  5 mg Oral Daily    tacrolimus  1.5 mg Oral Daily    tacrolimus  2 mg Oral Daily    topiramate  25 mg Oral BID    vancomycin (VANCOCIN) IVPB  750 mg Intravenous Q12H    venlafaxine  150 mg Oral QHS    venlafaxine  37.5 mg Oral QHS     PRN Meds:butalbital-acetaminophen-caffeine -40 mg, diphenhydrAMINE, LORazepam, ondansetron, oxyCODONE, tiZANidine    Review of patient's allergies indicates:   Allergen Reactions    Albuterol Palpitations    Colistin Anaphylaxis    Vancomycin analogues      Infusion reaction that does not resolve with slowing    Neupogen [filgrastim] Other (See Comments)     Ostealgia after five  daily doses of 300 mcg.      Bactrim [sulfamethoxazole-trimethoprim] Hives    Ceftazidime Hives     Pt stated can tolerate cefapine not ceftazidime    Ceftazidime     Dronabinol Other (See Comments)     Mental changes/hallucinations    Haldol [haloperidol lactate] Other (See Comments)     Seizure like activity    Nsaids (non-steroidal anti-inflammatory drug)      Cannot have due to lung transplant    Adhesive Rash     Cloth tape- please use tegaderm or paper tape    Aztreonam Rash    Ciprofloxacin Nausea And Vomiting     Projectile N/V, per patient.  Unwilling to retry therapy.       Review of Systems   Constitutional: Positive for fatigue. Negative for activity change, appetite change, chills, diaphoresis, fever and unexpected weight change.   HENT: Positive for congestion. Negative for drooling, ear discharge, ear pain, facial swelling, hearing loss, sore throat and trouble swallowing.    Eyes: Negative for discharge and itching.   Respiratory: Positive for cough (intermittently productive), chest tightness, shortness of breath and wheezing. Negative for apnea, choking and stridor.    Cardiovascular: Positive for chest pain (pleuritic). Negative for palpitations and leg swelling.   Gastrointestinal: Negative for abdominal distention, abdominal pain, blood in stool and constipation.   Endocrine: Negative.    Genitourinary: Negative for difficulty urinating and dysuria.   Musculoskeletal: Positive for back pain. Negative for arthralgias, gait problem, joint swelling and myalgias.   Skin: Negative for color change and pallor.   Neurological: Negative for dizziness and headaches.   Hematological: Negative for adenopathy.   Psychiatric/Behavioral: Negative for agitation, behavioral problems and confusion.     Objective:   Physical Exam   Constitutional: She is oriented to person, place, and time. She appears well-developed and well-nourished.   HENT:   Head: Normocephalic and atraumatic.   Mouth/Throat: No  oropharyngeal exudate.   Dry mucus membranes   Eyes: EOM are normal. Pupils are equal, round, and reactive to light. Right eye exhibits no discharge. No scleral icterus.   Neck: Normal range of motion. No thyromegaly present.   Cardiovascular: Normal rate and regular rhythm. Exam reveals no friction rub.   No murmur heard.  Pulmonary/Chest: Effort normal. No stridor. No respiratory distress. She has wheezes (scant, expiratory). She has rhonchi (diffuse). She has no rales.   Abdominal: Soft. She exhibits no distension. There is no tenderness. There is no guarding.   Musculoskeletal: She exhibits no edema or tenderness.   Neurological: She is alert and oriented to person, place, and time.   Skin: Skin is warm and dry.   Psychiatric: She has a normal mood and affect.         Vital Signs (Most Recent):  Temp: 98.4 °F (36.9 °C) (12/06/18 0829)  Pulse: 92 (12/06/18 0848)  Resp: 18 (12/06/18 0848)  BP: 110/69 (12/06/18 0829)  SpO2: 96 % (12/06/18 0829) Vital Signs (24h Range):  Temp:  [98 °F (36.7 °C)-98.7 °F (37.1 °C)] 98.4 °F (36.9 °C)  Pulse:  [60-92] 92  Resp:  [14-20] 18  SpO2:  [93 %-100 %] 96 %  BP: (102-126)/(63-79) 110/69     Weight: 55.1 kg (121 lb 7.6 oz)  Body mass index is 23.72 kg/m².      Intake/Output Summary (Last 24 hours) at 12/6/2018 1049  Last data filed at 12/6/2018 0800  Gross per 24 hour   Intake 2600 ml   Output 3500 ml   Net -900 ml       Significant Labs:  CBC:  Recent Labs   Lab 12/06/18  0500   WBC 2.63*   RBC 2.66*   HGB 7.4*   HCT 25.3*   PLT 82*   MCV 95   MCH 27.8   MCHC 29.2*     BMP:  Recent Labs   Lab 12/06/18  0500      K 4.1      CO2 25   BUN 18   CREATININE 1.3   CALCIUM 8.6*      Tacrolimus Levels:  Recent Labs   Lab 12/06/18  0500   TACROLIMUS 4.6*     Microbiology:  Microbiology Results (last 7 days)     Procedure Component Value Units Date/Time    Culture, Respiratory with Gram Stain [[id number]] Collected:  12/06/18 0924    Order Status:  Sent Specimen:  Respiratory  from Sputum, Expectorated Updated:  12/06/18 1041    Blood culture [[id number]] Collected:  12/04/18 2151    Order Status:  Completed Specimen:  Blood from Peripheral, Antecubital, Right Updated:  12/05/18 2312     Blood Culture, Routine No Growth to date     Blood Culture, Routine No Growth to date    Blood Culture #1 **CANNOT BE ORDERED STAT** [[id number]] Collected:  12/04/18 2120    Order Status:  Completed Specimen:  Blood from Line, Subclavian, Right Updated:  12/05/18 2212     Blood Culture, Routine No Growth to date     Blood Culture, Routine No Growth to date    Respiratory Viral Panel by PCR [facility]; Nasal Swab [[id number]] Collected:  12/05/18 0058    Order Status:  Sent Specimen:  Respiratory Updated:  12/05/18 0102          I have reviewed all pertinent labs within the past 24 hours.    Diagnostic Results:  Labs: Reviewed  ECG: Reviewed  X-Ray: Reviewed  CT: Reviewed  Mild nodular ground-glass attenuation in the posterior segment of the right upper lobe, unchanged.  Appearance of the pulmonary parenchyma is overall back to baseline when compared to December 2016.  There is thickening of the posterior wall of the bronchus intermedius which is new since prior examination which could represent inflammation.  No large focal consolidation, pneumothorax, significant pleural thickening, or lung mass.  No pleural effusions.

## 2018-12-06 NOTE — HPI
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 Last admit on 11/7/18 for LISBETH and polypharmacy toxicity. Presents on 12/4/18 with c/c of chest congestion and cough for one week evolution. Patient also mentioned using home oxygen more often. Patient denied recent travel, sick contacts, nausea, emesis or diarrhea. Was started empirically on cefepime, vanc and oseltamivir. ID consulted for antibiotic recommendations.

## 2018-12-06 NOTE — PLAN OF CARE
Problem: Patient Care Overview  Goal: Plan of Care Review  Outcome: Ongoing (interventions implemented as appropriate)  AAOx3, afebrile, c/o pain and anxiety. PRN and scheduled medications given. Pt on 2L NC (>92%). IV antibiotics continued. IV benadryl given 30 minutes before IV vanc. Pt able to position self independently. Pt in lowest position, side rails up x2, non-skid foot wear in place, call light within reach, pt verbalized understanding to call RN when needed. Hand hygiene practiced per protocol. Will continue to monitor.

## 2018-12-06 NOTE — CONSULTS
[facility]  Infectious Disease  Consult Note    Patient Name: Juanita Ibarra  MRN: [id number]  Admission Date: 12/4/2018  Hospital Length of Stay: 2 days  Attending Physician: Jake Alvarez MD  Primary Care Provider: Jake Alvarez MD     Isolation Status: No active isolations    Patient information was obtained from patient and ER records.      Consults  Assessment/Plan:     Bronchiolitis obliterans syndrome    27 y/o female PMHx: 6/12/18 CMV D+/R- simulect induction tacro/mmf/pred complicated with rejection A1 8/2014 and CMV reactivation. Last admit on 11/7/18 for LISBETH and polypharmacy toxicity. Now here with SOB and cough. CT chest with no obvious signs of consolidation or effusion, some inflammation of the bronchus intermedius. Respiratory culture gram stain remarkable for yeast, GNR and GPR. Previous BAL cultures have been positive for C glabrata, C dubliniensis, Pseudomonas, MRSA. Unclear if new respiratory culture will represent colonizing organisms or new infection. RVP was negative recommend to D/C oseltamivir. Recommend to consider bronchoscopy in setting of concern for new infection with benign CT chest. Thank you for the consult will follow progress.     Recommendations:   - Continue cefepime and vancomycin for now   - d/c oseltamivir                 Thank you for your consult. I will follow-up with patient. Please contact us if you have any additional questions.    José Miguel Juarez MD  Infectious Disease  [facility]    Subjective:     Principal Problem: <principal problem not specified>    HPI: Case of 27 y/o female PMHx: 6/12/18 CMV D+/R- simulect induction tacro/mmf/pred complicated with rejection A1 8/2014 and CMV reactivation. Last admit on 11/7/18 for LISBETH and polypharmacy toxicity. Presents on 12/4/18 with c/c of chest congestion and cough for one week evolution. Patient also mentioned using home oxygen more often. Patient denied recent  travel, sick contacts, nausea, emesis or diarrhea. Was started empirically on cefepime, vanc and oseltamivir. ID consulted for antibiotic recommendations.       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       Past Surgical History:   Procedure Laterality Date    ABDOMINAL SURGERY      peg tube     QHXETFTC-MIALROGPVNF-SPBMXKTYXHMO N/A 5/19/2015    Per[facility] by [facility]y [facility]    BIOPSY-BRONCHUS N/A 12/20/2016    Performed by Jake Alvarez MD at [facility] OR [facility]    BRONCHOSCOPY N/A 5/29/2018    Procedure: BRONCHOSCOPY; Bronchoscopy with BAL and transbronchial biopsies under general anesthesia;  Surgeon: Jake Alvarez MD;  Location: [facility] OR [address] [place] [address];  Service: Transplant;  Laterality: N/A;    BRONCHOSCOPY N/A 10/1/2018    Procedure: BRONCHOSCOPY;  Surgeon: Jake Alvarez MD;  [facility];  Service: Transplant;  Laterality: N/A;    BRONCHOSCOPY N/A 10/1/2018    Performed by Jake Alvarez MD at [facility] OR [facility]    BRONCHOSCOPY Bilateral 12/20/2016    Performed by Jake Alvarez MD at [facility] OR [address] [place] [address]    BRONCHOSCOPY - flexible bronchoscopy with probable tissue biopsy CPT 05334 N/A 7/21/2015    Performed by Jake Alvarez MD at [facility] OR [facility]    BRONCHOSCOPY - flexible bronchoscopy with probable tissue biospy CPT 97317 N/A 10/20/2015    Performed by Jake Alvarez MD at [facility] OR [facility]    BRONCHOSCOPY - flexible bronchoscopy with tissue biopsy CPT 99932 N/A 9/29/2015    Performed by Jake Alvarez MD at [facility] OR [address] [place] [address]    BRONCHOSCOPY - flexible  bronchoscopy with tissue biopsy CPT 76481 N/A 5/26/2015    Performed by Jake [facility]varez MD at [facility] OR [facility]    BRONCHOSCOPY flexible bronchoscopy with tissue biopsy N/A 9/8/2014    Performed by Jake Alvarez MD at [facility] OR [facility]    BRONCHOSCOPY flexible with possible tissue biopsy N/A 8/8/2014    Performed by Jake Alvarez MD at [facility] OR [facility]    BRONCHOSCOPY, BAL, BIOPSIES N/A 3/20/2018    Performed by Jake Alvarez MD at [facility] OR [facility]    BRONCHOSCOPY-FIBEROPTIC N/A 1/29/2016    Performed by Joann Cifuentes DO at [facility] OR [facility]    BRONCHOSCOPY; Bronchoscopy with BAL and transbronchial biopsies under general anesthesia N/A 5/29/2018    Performed by Jake Alvarez MD at [facility] OR [address] [place] [address]    CHOLECYSTECTOMY  2016    CHOLECYSTECTOMY-LAPAROSCOPIC N/A 7/22/2016    [facility]    ENDOMETRIAL ABLATION  2015    KJB    FESS      FESS Bilateral 10/21/2013    Performed by Jared Whatley MD at [facility] OR [address] [place] [address]    FINE NEEDLE ASPIRATION (FNA)  of a cervical lymphadenopathy  1/29/2016    Performed by Joann Cifuentes DO at [facility] OR [address] [place] [address]    flex bronchoscopy with probable tissue biopsy N/A 7/31/2014    Performed by [facility] Provider at [facility] OR [address] [place] [address]    flexible bronchoscopy with tissue biopsy N/A 12/11/2014    Performed by Jake Alvarez MD at [facility] OR [address] [place] [address]    INJECTION-VOCAL CORD Left 6/24/2014    Performed by Jared Whatley MD at [facility] OR [address] [place] [address]    REEBNEVTF-USHR-Y-CATH to right chest and removal of portacath to left chests wall. Bilateral 6/24/2014    Performed by Zhen Dorado MD at [facility] OR [address] [place] [address]    LARYNX SURGERY  2016    LUNG TRANSPLANT Bilateral 6/12/14    MYRINGOTOMY W/ TUBES Right 04/2017    MYRINGOTOMY WITH INSERTION OF PE TUBES Right 4/15/2017    Performed by Gary Null MD at [facility] OR [address] [place] [address]    PLACEMENT-TUBE-PEG  5/15/2014    Performed by David Moses MD at [facility] OR [address] [place] [address]    PORTACATH  PLACEMENT Right     rt sc    REMOVAL-TUBE-PEG  5/15/2014    Performed by David Moses MD at [facility] OR [address] [place] [address]    ROBOTIC ASSISTED LAPAROSCOPIC HYSTERECTOMY N/A 4/25/2017    Performed by Deny Dias Jr., MD at [facility] OR    SALPINGECTOMY Bilateral 2015    KJB    SALPINGECTOMY-LAPAROSCOPIC Bilateral 5/19/2015    Performed by Deny Dias Jr., MD at [facility] OR    SINUS SURGERY FUNCTIONAL ENDOSCOPIC WITH NAVIGATION Bilateral 4/3/2017    Performed by Cesar Olsen MD at [facility] OR [address] [place] [address]    SINUS SURGERY FUNCTIONAL ENDOSCOPIC WITH NAVIGATION, 50160, 27904, 23631, 94245 Bilateral 2/5/2015    Performed by Cesar Olsen MD at [facility] OR [address] [place] [address]    THYROPLASTY - Medialization laryngoplasty, cricothyroid subluxation, arytenoid repositioning Left 8/2/2016    Performed by Gary Null MD at [facility] OR [address] [place] [address]    TRANSPLANT-LUNG Bilateral 6/11/2014    Performed by Adolfo Huston MD at [facility] OR [address] [place] [address]       Review of patient's allergies indicates:   Allergen Reactions    Albuterol Palpitations    Colistin Anaphylaxis    Vancomycin analogues      Infusion reaction that does not resolve with slowing    Neupogen [filgrastim] Other (See Comments)     Ostealgia after five daily doses of 300 mcg.      Bactrim [sulfamethoxazole-trimethoprim] Hives    Ceftazidime Hives     Pt stated can tolerate cefapine not ceftazidime    Ceftazidime     Dronabinol Other (See Comments)     Mental changes/hallucinations    Haldol [haloperidol lactate] Other (See Comments)     Seizure like activity    Nsaids (non-steroidal anti-inflammatory drug)      Cannot have due to lung transplant    Adhesive Rash     Cloth tape- please use tegaderm or paper tape    Aztreonam Rash    Ciprofloxacin Nausea And Vomiting     Projectile N/V, per patient.  Unwilling to retry therapy.       Medications:  Medications Prior to Admission   Medication Sig    amLODIPine (NORVASC) 10 MG tablet Take 1 tablet (10 mg total) by mouth once daily.     aripiprazole (ABILIFY) 2 MG Tab Take 2 mg by mouth once daily.    calcium-vitamin D3 (OS-KATY 500 + D3) 500 mg(1,250mg) -200 unit per tablet Take 1 tablet by mouth 2 (two) times daily with meals.    dapsone 100 MG Tab Take 1 tablet (100 mg total) by mouth once daily.    diphenhydrAMINE (BENADRYL) 50 MG tablet Take 1 tablet (50 mg total) by mouth every 6 (six) hours as needed for Itching.    DULERA 100-5 mcg/actuation HFAA INHALE 1 PUFF BY MOUTH TWICE DAILY    fexofenadine (ALLEGRA) 180 MG tablet Take 180 mg by mouth once daily.    fluconazole (DIFLUCAN) 150 MG Tab TAKE 1 TABLET BY MOUTH EVERY WEEK    fluticasone (FLONASE) 50 mcg/actuation nasal spray INSERT 2 SPRAYS IN EACH NOSTRIL DAILY    folic acid (FOLVITE) 1 MG tablet Take 1 tablet (1,000 mcg total) by mouth once daily.    gabapentin (NEURONTIN) 300 MG capsule Take 1 capsule (300 mg total) by mouth 3 (three) times daily.    inhalation spacing device (PROCHAMBER) USE AS DIRECTED    levalbuterol (XOPENEX) 1.25 mg/3 mL nebulizer solution Take 3 mLs (1.25 mg total) by nebulization every 8 (eight) hours as needed for Wheezing or Shortness of Breath. Rescue    lipase-protease-amylase (CREON) 36,000-114,000- 180,000 unit CpDR Take 4 capsules by mouth 3 (three) times daily with meals. Take 3 with snacks prn. (Patient taking differently: Take 5 capsules by mouth 3 (three) times daily with meals. Take 3 with snacks prn.)    LORazepam (ATIVAN) 1 MG tablet Take 2 mg by mouth every 6 (six) hours as needed for Anxiety.     magnesium oxide (MAG-OX) 400 mg tablet Take 1 tablet (400 mg total) by mouth 2 (two) times daily.    metoprolol tartrate (LOPRESSOR) 25 MG tablet Take 1 tablet (25 mg total) by mouth 2 (two) times daily.    montelukast (SINGULAIR) 10 mg tablet Take 1 tablet (10 mg total) by mouth once daily.    morphine (MS CONTIN) 15 MG 12 hr tablet Take 15 mg by mouth 2 (two) times daily.     multivit,min52-folic-vitK-cQ10 (AQUADEKS) 100700-10  mcg-mcg-mg Cap cap 1 tab twice daily    mycophenolate (CELLCEPT) 250 mg Cap Take 2 capsules (500 mg total) by mouth 2 (two) times daily.    nystatin (MYCOSTATIN) 100,000 unit/mL suspension Take 5 mLs (500,000 Units total) by mouth 3 (three) times daily. for 10 days    omeprazole (PRILOSEC) 40 MG capsule Take 1 capsule (40 mg total) by mouth every morning.    ondansetron (ZOFRAN) 8 MG tablet TAKE 1 TABLET(8 MG) BY MOUTH EVERY 8 HOURS AS NEEDED FOR NAUSEA    oxycodone (ROXICODONE) 5 MG immediate release tablet Take 10 mg by mouth every 4 (four) hours as needed for Pain.    polyethylene glycol (GLYCOLAX) 17 gram PwPk Take 17 g by mouth 2 (two) times daily.    predniSONE (DELTASONE) 5 MG tablet Take 1 tablet (5 mg total) by mouth once daily.    promethazine (PHENERGAN) 25 MG tablet TAKE 1 TABLET(25 MG) BY MOUTH EVERY 8 HOURS AS NEEDED    tacrolimus (PROGRAF) 0.5 MG Cap Take 1 capsule (0.5 mg total) by mouth once daily.    tacrolimus (PROGRAF) 1 MG Cap Daily doses: 2 mg in am, 1.5 mg in pm by mouth    tiZANidine (ZANAFLEX) 4 MG tablet TAKE 2 TABLETS BY MOUTH EVERY 6 HOURS AS NEEDED    topiramate (TOPAMAX) 25 MG tablet Take 1 tablet (25 mg total) by mouth 2 (two) times daily.    venlafaxine (EFFEXOR-XR) 75 MG 24 hr capsule Take 1 capsule (75 mg total) by mouth every evening. (Patient taking differently: Take 150 mg by mouth every evening. )     Antibiotics (From admission, onward)    Start     Stop Route Frequency Ordered    12/05/18 1130  vancomycin 750 mg in dextrose 5 % 250 mL IVPB (ready to mix system)  (Vancomycin IVPB with levels panel)      -- IV Every 12 hours (non-standard times) 12/05/18 1018    12/05/18 1045  ceFEPIme injection 1 g      -- IV Every 8 hours (non-standard times) 12/05/18 0947    12/05/18 0900  dapsone tablet 100 mg      -- Oral Daily 12/04/18 2216        Antifungals (From admission, onward)    None        Antivirals (From admission, onward)    None           Immunization History    Administered Date(s) Administered    Cytomegalovirus Immune Globulin 06/13/2014       Family History     Problem Relation (Age of Onset)    Drug abuse Maternal Grandfather    Thrombocytopenia Maternal Grandfather        Social History     Socioeconomic History    Marital status: Single     Spouse name: None    Number of children: None    Years of education: None    Highest education level: None   Social Needs    Financial resource strain: None    Food insecurity - worry: None    Food insecurity - inability: None    Transportation needs - medical: None    Transportation needs - non-medical: None   Occupational History    None   Tobacco Use    Smoking status: Never Smoker    Smokeless tobacco: Never Used   Substance and Sexual Activity    Alcohol use: No     Comment: Has not had an alcoholic drink in more than 2 months.    Drug use: No    Sexual activity: Yes     Partners: Male     Birth control/protection: Implant   Other Topics Concern    None   Social History Narrative    None     Review of Systems   Constitutional: Positive for fatigue. Negative for chills and fever.   Respiratory: Positive for cough and shortness of breath.    Gastrointestinal: Negative for abdominal distention, abdominal pain, diarrhea, nausea and vomiting.     Objective:     Vital Signs (Most Recent):  Temp: 98.8 °F (37.1 °C) (12/06/18 1237)  Pulse: 88 (12/06/18 1319)  Resp: 18 (12/06/18 1319)  BP: 113/70 (12/06/18 1240)  SpO2: (!) 94 % (12/06/18 1319) Vital Signs (24h Range):  Temp:  [98 °F (36.7 °C)-98.8 °F (37.1 °C)] 98.8 °F (37.1 °C)  Pulse:  [60-92] 88  Resp:  [16-20] 18  SpO2:  [93 %-100 %] 94 %  BP: (102-119)/(63-76) 113/70     Weight: 55.1 kg (121 lb 7.6 oz)  Body mass index is 23.72 kg/m².    Estimated Creatinine Clearance: 49.7 mL/min (based on SCr of 1.3 mg/dL).    Physical Exam   Constitutional: She is oriented to person, place, and time. She appears well-developed and well-nourished.   HENT:   Head:  Normocephalic and atraumatic.   Eyes: EOM are normal. Pupils are equal, round, and reactive to light.   Neck: Normal range of motion.   Cardiovascular: Normal rate, regular rhythm and normal heart sounds.   Pulmonary/Chest: Effort normal. She has wheezes.   Abdominal: Soft. Bowel sounds are normal. She exhibits no distension. There is no tenderness.   Musculoskeletal: Normal range of motion. She exhibits no edema.   Neurological: She is alert and oriented to person, place, and time.   Skin: No rash noted.       Significant Labs:   Blood Culture:   Recent Labs   Lab 11/07/18  0200 11/07/18  0252 12/04/18 2120 12/04/18  2151   LABBLOO No growth after 5 days. No growth after 5 days. No Growth to date  No Growth to date No Growth to date  No Growth to date     BMP:   Recent Labs   Lab 12/06/18  0500         K 4.1      CO2 25   BUN 18   CREATININE 1.3   CALCIUM 8.6*     CBC:   Recent Labs   Lab 12/04/18 2119 12/05/18  0407 12/06/18  0500   WBC 4.06 4.23 2.63*   HGB 8.2* 7.9* 7.4*   HCT 28.1* 25.9* 25.3*   * 89* 82*     CMP:   Recent Labs   Lab 12/04/18 2119 12/05/18  0407 12/06/18  0500    138 137   K 4.0 3.8 4.1    107 104   CO2 22* 21* 25   GLU 72 86 107   BUN 26* 22* 18   CREATININE 1.3 1.2 1.3   CALCIUM 8.8 8.8 8.6*   PROT 7.1 6.9 6.6   ALBUMIN 3.8 3.7 3.4*   BILITOT 0.3 0.3 0.3   ALKPHOS 156* 306* 222*   AST 21 239* 45*   ALT 28 124* 72*   ANIONGAP 9 10 8   EGFRNONAA 55.6* >60.0 55.6*     Microbiology Results (last 7 days)     Procedure Component Value Units Date/Time    Culture, Respiratory with Gram Stain [[id number]] Collected:  12/06/18 0924    Order Status:  Completed Specimen:  Respiratory from Sputum, Expectorated Updated:  12/06/18 1244     Gram Stain (Respiratory) <10 epithelial cells per low power field.     Gram Stain (Respiratory) Many WBC's     Gram Stain (Respiratory) Moderate Gram positive rods     Gram Stain (Respiratory) Moderate Gram negative rods     Gram  Stain (Respiratory) Moderate budding yeast    Respiratory Viral Panel by PCR [facility]; Nasal Swab [[id number]] Collected:  12/05/18 0058    Order Status:  Completed Specimen:  Respiratory Updated:  12/06/18 1156     Respiratory Virus Panel, source Nasal Swab     RVP - Adenovirus Not Detected     Comment: Detects Serotypes B and E. Detection of Serotype C may   be limited. If Adenovirus infection is suspected and a   Not Detected result is returned the sample should be   re-tested for Adenovirus using an independent method  (e.g. [employer] Adenovirus Quantitative Real-Time  PCR test.          Enterovirus Not Detected     Comment: Cross-reactivity has been observed between certain Rhinovirus  strains and the Enterovirus assay.          Human Bocavirus Not Detected     Human Coronavirus Not Detected     Comment: The Human Coronavirus assay detects Human coronavirus types  229E, OC43,NL63 and HKU1.          RVP - Human Metapneumovirus (hMPV) Not Detected     RVP - Influenza A Not Detected     Influenza A - E9S3-29 Not Detected     RVP - Influenza B Not Detected     Parainfluenza Not Detected     Respiratory Syncytial VirusVirus (RSV) A Not Detected     Comment: The Respiratory Syncytial Viral assay detects types A and B,  however it does not distinguish between the two.          RVP - Rhinovirus Not Detected     Comment: Cross-Reactivity has been observed between certain   Rhinovirus strains and the Enterovirus assay.  Target Enriched Mulitplex Polymerase Chain Reaction (TEM-PCR)  allows for the detection of multiple pathogens out of a single  reaction.  This test was developed and its performance   characteristics determined by [employer].  It has not   been cleared or approved by the U.S.Food and Drug Administration.  Results should be used in conjunction with clinical findings,   and should not form the sole basis for a diagnosis or treatment  decision.  TEM-PCR is a licensed technology of [employer].         Narrative:       Receiving Lab:->[facility]    Blood culture [[id number]] Collected:  12/04/18 2151    Order Status:  Completed Specimen:  Blood from Peripheral, Antecubital, Right Updated:  12/05/18 2312     Blood Culture, Routine No Growth to date     Blood Culture, Routine No Growth to date    Blood Culture #1 **CANNOT BE ORDERED STAT** [[id number]] Collected:  12/04/18 2120    Order Status:  Completed Specimen:  Blood from Line, Subclavian, Right Updated:  12/05/18 2212     Blood Culture, Routine No Growth to date     Blood Culture, Routine No Growth to date          Significant Imaging: I have reviewed all pertinent imaging results/findings within the past 24 hours.

## 2018-12-06 NOTE — PLAN OF CARE
"Problem: Patient Care Overview  Goal: Plan of Care Review  Outcome: Ongoing (interventions implemented as appropriate)  Pt AAOx4, VSS, afebrile. Pt admitted today for SOB & chest congestion. CT ordered/resulting in "mild nodular ground-glass attenuation in the posterior segment of the right upper lobe, unchanged.  Appearance of the pulmonary parenchyma is overall back to baseline when compared to December 2016. There is thickening of the posterior wall of the bronchus intermedius which is new since prior examination which could represent inflammation.  No large focal consolidation, pneumothorax, significant pleural thickening, or lung mass.  No pleural effusions". TTE on 12/5 with EF of 55%, mild left atrial enlargement, PAP 26, mild tricuspid regurg, normal LV/RV function. Empiric tamiflu/Cefepime/Vanc started, cultures pending. ID consulted, will follow up erika with pt as pt was not in room during rounding. Patient with persistent decline on outpatient PFTs, "there are concerns for progression of CARLOS EDUARDO versus infectious etiology", per PA. Hepatitis panel was negative, liver u/s unremarkable. Pt currently on 2L n/c, more for comfort. Pt refusing Visi & Lovenox. Benadryl to be given 30 min prior to Vanc infusion. R subclavian port accessed. Bed in low/locked position, call light/personal belongings within reach, non-slip socks on when OOB, will cont to monitor.        "

## 2018-12-06 NOTE — SUBJECTIVE & OBJECTIVE
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 OR 2ND FLR    BRONCHOSCOPY N/A 5/29/2018    Procedure: BRONCHOSCOPY; Bronchoscopy with BAL and transbronchial biopsies under general anesthesia;  Surgeon: Jake Alvarez MD;  [facility];  Service: Transplant;  Laterality: N/A;    BRONCHOSCOPY N/A 10/1/2018    Procedure: BRONCHOSCOPY;  Surgeon: Jake Alvarez MD;  [facility];  Service: Transplant;  Laterality: N/A;    BRONCHOSCOPY N/A 10/1/2018    Performed by Jake Alvarez MD at [facility] OR 2ND [facility]    BRONCHOSCOPY Bilateral 12/20/2016    Performed by Jake Alvarez MD at [facility] OR [facility]ND [facility]LR    BRONCHOSCOPY - flexible bronchoscopy with probable tissue biopsy CPT 97620 N/A 7/21/2015    Performed by Jake Alvarez MD at [facility] OR 2ND [facility]LR    BRONCHOSCOPY - flexible bronchoscopy with probable tissue biospy CPT 73837 N/A 10/20/2015    Performed by Jake Alvarez MD at [facility] OR 2ND [facility]LR    BRONCHOSCOPY - flexible bronchoscopy with tissue biopsy CPT 92047 N/A 9/29/2015    Performed by Jake Alvarez MD at [facility] OR 2ND [facility]LR    BRONCHOSCOPY - flexible bronchoscopy with tissue biopsy CPT 26511 N/A 5/26/2015    Performed by Jake Alvarez MD at [facility] OR 1ST FLR    BRONCHOSCOPY flexible bronchoscopy with tissue biopsy  N/A 9/8/2014    Performed by Jake Alvarez MD at [facility] OR [address] [place] [address]    BRONCHOSCOPY flexible with possible tissue biopsy N/A 8/8/2014    Performed by Jake Alvarez MD at [facility] OR [address] [place] [address]    BRONCHOSCOPY, BAL, BIOPSIES N/A 3/20/2018    Performed by Jake Alvarez MD at [facility] OR [address] [place] [address]    BRONCHOSCOPY-FIBEROPTIC N/A 1/29/2016    Performed by Joann Cifuentes DO at [facility] OR [address] [place] [address]    BRONCHOSCOPY; Bronchoscopy with BAL and transbronchial biopsies under general anesthesia N/A 5/29/2018    Performed by Jake Alvarez MD at [facility] OR [address] [place] [address]    CHOLECYSTECTOMY  2016    CHOLECYSTECTOMY-LAPAROSCOPIC N/A 7/22/2016    Performed by Joshua Goldberg, MD at [facility] OR [address] [place] [address]    ENDOMETRIAL ABLATION  2015    KJB    FESS      FESS Bilateral 10/21/2013    Performed by Jared Whatley MD at [facility] OR [address] [place] [address]    FINE NEEDLE ASPIRATION (FNA)  of a cervical lymphadenopathy  1/29/2016    Performed by Joann Cifuentes DO at [facility] OR [address] [place] [address]    flex bronchoscopy with probable tissue biopsy N/A 7/31/2014    Performed by [facility] Provider at [facility] OR [address] [place] [address]    flexible bronchoscopy with tissue biopsy N/A 12/11/2014    Performed by Jake Alvarez MD at [facility] OR [address] [place] [address]    INJECTION-VOCAL CORD Left 6/24/2014    Performed by Jared Whatley MD at [facility] OR [address] [place] [address]    KCZMXCEUX-OSNQ-V-CATH to right chest and removal of portacath to left chests wall. Bilateral 6/24/2014    Performed by Zhen Dorado MD at [facility] OR [address] [place] [address]    LARYNX SURGERY  2016    LUNG TRANSPLANT Bilateral 6/12/14    MYRINGOTOMY W/ TUBES Right 04/2017    MYRINGOTOMY WITH INSERTION OF PE TUBES Right 4/15/2017    [facility]    PLACEMENT-TUBE-PEG  5/15/2014    Per[facility] Moses [facility]    PORTACATH PLACEMENT Right     rt sc    REMOVAL-TUBE-PEG  5/15/2014    Performed by David Moses MD at [facility] OR [address] [place] [address]    ROBOTIC ASSISTED LAPAROSCOPIC HYSTERECTOMY N/A  4/25/2017    Performed by Deny Dias Jr., MD at [facility] OR    SALPINGECTOMY Bilateral 2015    KJB    SALPINGECTOMY-LAPAROSCOPIC Bilateral 5/19/2015    Performed by Deny Dias Jr., MD at [facility] OR    SINUS SURGERY FUNCTIONAL ENDOSCOPIC WITH NAVIGATION Bilateral 4/3/2017    Performed by Cesar Olsen MD at [facility]    SINUS SURGERY FUNCTIONAL ENDOSCOPIC WITH NAVIGATION, 71609, 15944, 27332, 33558 Bilateral 2/5/2015    [facility]    THYROPLASTY - Medialization laryngoplasty, cricothyroid subluxation, arytenoid repositioning Left 8/2/2016    Performed by Gary Null MD at [facility] OR [address] [place] [address]    TRANSPLANT-LUNG Bilateral 6/11/2014    Performed by Adolfo Huston MD at [facility] OR [address] [place] [address]       Review of patient's allergies indicates:   Allergen Reactions    Albuterol Palpitations    Colistin Anaphylaxis    Vancomycin analogues      Infusion reaction that does not resolve with slowing    Neupogen [filgrastim] Other (See Comments)     Ostealgia after five daily doses of 300 mcg.      Bactrim [sulfamethoxazole-trimethoprim] Hives    Ceftazidime Hives     Pt stated can tolerate cefapine not ceftazidime    Ceftazidime     Dronabinol Other (See Comments)     Mental changes/hallucinations    Haldol [haloperidol lactate] Other (See Comments)     Seizure like activity    Nsaids (non-steroidal anti-inflammatory drug)      Cannot have due to lung transplant    Adhesive Rash     Cloth tape- please use tegaderm or paper tape    Aztreonam Rash    Ciprofloxacin Nausea And Vomiting     Projectile N/V, per patient.  Unwilling to retry therapy.       Medications:  Medications Prior to Admission   Medication Sig    amLODIPine (NORVASC) 10 MG tablet Take 1 tablet (10 mg total) by mouth once daily.    aripiprazole (ABILIFY) 2 MG Tab Take 2 mg by mouth once daily.    calcium-vitamin D3 (OS-KATY 500 + D3) 500 mg(1,250mg) -200 unit per tablet Take 1 tablet by mouth 2  (two) times daily with meals.    dapsone 100 MG Tab Take 1 tablet (100 mg total) by mouth once daily.    diphenhydrAMINE (BENADRYL) 50 MG tablet Take 1 tablet (50 mg total) by mouth every 6 (six) hours as needed for Itching.    DULERA 100-5 mcg/actuation HFAA INHALE 1 PUFF BY MOUTH TWICE DAILY    fexofenadine (ALLEGRA) 180 MG tablet Take 180 mg by mouth once daily.    fluconazole (DIFLUCAN) 150 MG Tab TAKE 1 TABLET BY MOUTH EVERY WEEK    fluticasone (FLONASE) 50 mcg/actuation nasal spray INSERT 2 SPRAYS IN EACH NOSTRIL DAILY    folic acid (FOLVITE) 1 MG tablet Take 1 tablet (1,000 mcg total) by mouth once daily.    gabapentin (NEURONTIN) 300 MG capsule Take 1 capsule (300 mg total) by mouth 3 (three) times daily.    inhalation spacing device ([employer]) USE AS DIRECTED    levalbuterol (XOPENEX) 1.25 mg/3 mL nebulizer solution Take 3 mLs (1.25 mg total) by nebulization every 8 (eight) hours as needed for Wheezing or Shortness of Breath. Rescue    lipase-protease-amylase (CREON) 36,000-114,000- 180,000 unit CpDR Take 4 capsules by mouth 3 (three) times daily with meals. Take 3 with snacks prn. (Patient taking differently: Take 5 capsules by mouth 3 (three) times daily with meals. Take 3 with snacks prn.)    LORazepam (ATIVAN) 1 MG tablet Take 2 mg by mouth every 6 (six) hours as needed for Anxiety.     magnesium oxide (MAG-OX) 400 mg tablet Take 1 tablet (400 mg total) by mouth 2 (two) times daily.    metoprolol tartrate (LOPRESSOR) 25 MG tablet Take 1 tablet (25 mg total) by mouth 2 (two) times daily.    montelukast (SINGULAIR) 10 mg tablet Take 1 tablet (10 mg total) by mouth once daily.    morphine (MS CONTIN) 15 MG 12 hr tablet Take 15 mg by mouth 2 (two) times daily.     multivit,min52-folic-vitK-cQ10 (AQUADEKS) 100-700-10 mcg-mcg-mg Cap cap 1 tab twice daily    mycophenolate (CELLCEPT) 250 mg Cap Take 2 capsules (500 mg total) by mouth 2 (two) times daily.    nystatin (MYCOSTATIN) 100,000  unit/mL suspension Take 5 mLs (500,000 Units total) by mouth 3 (three) times daily. for 10 days    omeprazole (PRILOSEC) 40 MG capsule Take 1 capsule (40 mg total) by mouth every morning.    ondansetron (ZOFRAN) 8 MG tablet TAKE 1 TABLET(8 MG) BY MOUTH EVERY 8 HOURS AS NEEDED FOR NAUSEA    oxycodone (ROXICODONE) 5 MG immediate release tablet Take 10 mg by mouth every 4 (four) hours as needed for Pain.    polyethylene glycol (GLYCOLAX) 17 gram PwPk Take 17 g by mouth 2 (two) times daily.    predniSONE (DELTASONE) 5 MG tablet Take 1 tablet (5 mg total) by mouth once daily.    promethazine (PHENERGAN) 25 MG tablet TAKE 1 TABLET(25 MG) BY MOUTH EVERY 8 HOURS AS NEEDED    tacrolimus (PROGRAF) 0.5 MG Cap Take 1 capsule (0.5 mg total) by mouth once daily.    tacrolimus (PROGRAF) 1 MG Cap Daily doses: 2 mg in am, 1.5 mg in pm by mouth    tiZANidine (ZANAFLEX) 4 MG tablet TAKE 2 TABLETS BY MOUTH EVERY 6 HOURS AS NEEDED    topiramate (TOPAMAX) 25 MG tablet Take 1 tablet (25 mg total) by mouth 2 (two) times daily.    venlafaxine (EFFEXOR-XR) 75 MG 24 hr capsule Take 1 capsule (75 mg total) by mouth every evening. (Patient taking differently: Take 150 mg by mouth every evening. )     Antibiotics (From admission, onward)    Start     Stop Route Frequency Ordered    12/05/18 1130  vancomycin 750 mg in dextrose 5 % 250 mL IVPB (ready to mix system)  (Vancomycin IVPB with levels panel)      -- IV Every 12 hours (non-standard times) 12/05/18 1018    12/05/18 1045  ceFEPIme injection 1 g      -- IV Every 8 hours (non-standard times) 12/05/18 0947    12/05/18 0900  dapsone tablet 100 mg      -- Oral Daily 12/04/18 2212        Antifungals (From admission, onward)    None        Antivirals (From admission, onward)    None           Immunization History   Administered Date(s) Administered    Cytomegalovirus Immune Globulin 06/13/2014       Family History     Problem Relation (Age of Onset)    Drug abuse Maternal Grandfather     Thrombocytopenia Maternal Grandfather        Social History     Socioeconomic History    Marital status: Single     Spouse name: None    Number of children: None    Years of education: None    Highest education level: None   Social Needs    Financial resource strain: None    Food insecurity - worry: None    Food insecurity - inability: None    Transportation needs - medical: None    Transportation needs - non-medical: None   Occupational History    None   Tobacco Use    Smoking status: Never Smoker    Smokeless tobacco: Never Used   Substance and Sexual Activity    Alcohol use: No     Comment: Has not had an alcoholic drink in more than 2 months.    Drug use: No    Sexual activity: Yes     Partners: Male     Birth control/protection: Implant   Other Topics Concern    None   Social History Narrative    None     Review of Systems   Constitutional: Positive for fatigue. Negative for chills and fever.   Respiratory: Positive for cough and shortness of breath.    Gastrointestinal: Negative for abdominal distention, abdominal pain, diarrhea, nausea and vomiting.     Objective:     Vital Signs (Most Recent):  Temp: 98.8 °F (37.1 °C) (12/06/18 1237)  Pulse: 88 (12/06/18 1319)  Resp: 18 (12/06/18 1319)  BP: 113/70 (12/06/18 1240)  SpO2: (!) 94 % (12/06/18 1319) Vital Signs (24h Range):  Temp:  [98 °F (36.7 °C)-98.8 °F (37.1 °C)] 98.8 °F (37.1 °C)  Pulse:  [60-92] 88  Resp:  [16-20] 18  SpO2:  [93 %-100 %] 94 %  BP: (102-119)/(63-76) 113/70     Weight: 55.1 kg (121 lb 7.6 oz)  Body mass index is 23.72 kg/m².    Estimated Creatinine Clearance: 49.7 mL/min (based on SCr of 1.3 mg/dL).    Physical Exam   Constitutional: She is oriented to person, place, and time. She appears well-developed and well-nourished.   HENT:   Head: Normocephalic and atraumatic.   Eyes: EOM are normal. Pupils are equal, round, and reactive to light.   Neck: Normal range of motion.   Cardiovascular: Normal rate, regular rhythm and  normal heart sounds.   Pulmonary/Chest: Effort normal. She has wheezes.   Abdominal: Soft. Bowel sounds are normal. She exhibits no distension. There is no tenderness.   Musculoskeletal: Normal range of motion. She exhibits no edema.   Neurological: She is alert and oriented to person, place, and time.   Skin: No rash noted.       Significant Labs:   Blood Culture:   Recent Labs   Lab 11/07/18  0200 11/07/18  0252 12/04/18 2120 12/04/18  2151   LABBLOO No growth after 5 days. No growth after 5 days. No Growth to date  No Growth to date No Growth to date  No Growth to date     BMP:   Recent Labs   Lab 12/06/18  0500         K 4.1      CO2 25   BUN 18   CREATININE 1.3   CALCIUM 8.6*     CBC:   Recent Labs   Lab 12/04/18 2119 12/05/18  0407 12/06/18  0500   WBC 4.06 4.23 2.63*   HGB 8.2* 7.9* 7.4*   HCT 28.1* 25.9* 25.3*   * 89* 82*     CMP:   Recent Labs   Lab 12/04/18 2119 12/05/18  0407 12/06/18  0500    138 137   K 4.0 3.8 4.1    107 104   CO2 22* 21* 25   GLU 72 86 107   BUN 26* 22* 18   CREATININE 1.3 1.2 1.3   CALCIUM 8.8 8.8 8.6*   PROT 7.1 6.9 6.6   ALBUMIN 3.8 3.7 3.4*   BILITOT 0.3 0.3 0.3   ALKPHOS 156* 306* 222*   AST 21 239* 45*   ALT 28 124* 72*   ANIONGAP 9 10 8   EGFRNONAA 55.6* >60.0 55.6*     Microbiology Results (last 7 days)     Procedure Component Value Units Date/Time    Culture, Respiratory with Gram Stain [[id number]] Collected:  12/06/18 0924    Order Status:  Completed Specimen:  Respiratory from Sputum, Expectorated Updated:  12/06/18 1244     Gram Stain (Respiratory) <10 epithelial cells per low power field.     Gram Stain (Respiratory) Many WBC's     Gram Stain (Respiratory) Moderate Gram positive rods     Gram Stain (Respiratory) Moderate Gram negative rods     Gram Stain (Respiratory) Moderate budding yeast    Respiratory Viral Panel by PCR [facility]; Nasal Swab [[id number]] Collected:  12/05/18 0058    Order Status:  Completed Specimen:   Respiratory Updated:  12/06/18 1156     Respiratory Virus Panel, source Nasal Swab     RVP - Adenovirus Not Detected     Comment: Detects Serotypes B and E. Detection of Serotype C may   be limited. If Adenovirus infection is suspected and a   Not Detected result is returned the sample should be   re-tested for Adenovirus using an independent method  (e.g. [facility] Adenovirus Quantitative Real-Time  PCR test.          Enterovirus Not Detected     Comment: Cross-reactivity has been observed between certain Rhinovirus  strains and the Enterovirus assay.          Human Bocavirus Not Detected     Human Coronavirus Not Detected     Comment: The Human Coronavirus assay detects Human coronavirus types  229E, OC43,NL63 and HKU1.          RVP - Human Metapneumovirus (hMPV) Not Detected     RVP - Influenza A Not Detected     Influenza A - [id number] Not Detected     RVP - Influenza B Not Detected     Parainfluenza Not Detected     Respiratory Syncytial VirusVirus (RSV) A Not Detected     Comment: The Respiratory Syncytial Viral assay detects types A and B,  however it does not distinguish between the two.          RVP - Rhinovirus Not Detected     Comment: Cross-Reactivity has been observed between certain   Rhinovirus strains and the Enterovirus assay.  Target Enriched Mulitplex Polymerase Chain Reaction (TEM-PCR)  allows for the detection of multiple pathogens out of a single  reaction.  This test was developed and its performance   characteristics determined by [employer].  It has not   been cleared or approved by the U.S.Food and Drug Administration.  Results should be used in conjunction with clinical findings,   and should not form the sole basis for a diagnosis or treatment  decision.  TEM-PCR is a licensed technology of [employer].         Narrative:       Receiving Lab:->[facility]    Blood culture [[id number]] Collected:  12/04/18 2151    Order Status:  Completed Specimen:  Blood from  Peripheral, Antecubital, Right Updated:  12/05/18 2312     Blood Culture, Routine No Growth to date     Blood Culture, Routine No Growth to date    Blood Culture #1 **CANNOT BE ORDERED STAT** [[id number]] Collected:  12/04/18 2120    Order Status:  Completed Specimen:  Blood from Line, Subclavian, Right Updated:  12/05/18 2212     Blood Culture, Routine No Growth to date     Blood Culture, Routine No Growth to date          Significant Imaging: I have reviewed all pertinent imaging results/findings within the past 24 hours.

## 2018-12-07 NOTE — ASSESSMENT & PLAN NOTE
Patient with persistent decline on outpatient PFTs. Concerns for progression of CARLOS EDUARDO versus infectious etiology. Will continue oxygen supplementation to maintain O2 sats >88%.  Reports feeling better today.  Currently on room air.

## 2018-12-07 NOTE — ASSESSMENT & PLAN NOTE
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 Concerns for progression of her CARLOS EDUARDO versus infectious etiology. RVP, sputum culture ordered. Procalcitonin negative. CXR without new infiltrates. Troponin and BNP unremarkable. CT chest 12/5 - GGOs relatively unchanged from prior examination. TTE on 12/5 with EF of 55%, mild left atrial enlargement, PAP 26, mild tricuspid regurg, normal LV/RV function.      Continue immunosuppression and ppx. Continue oxygen supplementation to maintain goal O2 sats >88%. RVP negative so Tamiflu stopped.  Continue empiric Cefepime/Vanc. Will follow up on cultures. ID consulted, appreciate recs.

## 2018-12-07 NOTE — SUBJECTIVE & OBJECTIVE
Review of Systems   Constitutional: Negative for chills and fever.   HENT: Negative for sore throat and trouble swallowing.    Respiratory: Positive for cough. Negative for shortness of breath.    Cardiovascular: Negative for chest pain and leg swelling.   Gastrointestinal: Negative for abdominal distention, abdominal pain, constipation, diarrhea, nausea and vomiting.   Genitourinary: Negative for decreased urine volume and difficulty urinating.   Musculoskeletal: Negative for arthralgias and back pain.   Skin: Negative for color change and pallor.   Neurological: Negative for dizziness and light-headedness.   Psychiatric/Behavioral: Negative for agitation and confusion.       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       Past Surgical History:   Procedure Laterality Date    ABDOMINAL SURGERY      peg tube     [id number] N/A 5/19/2015    Performed by Deny Dias Jr[facility] at [facility] OR    BIOPSY-BRONCHUS N/A 12/20/2016    Performed by Jake Alvarez MD at [facility] OR [address] [place] [address]    BRONCHOSCOPY N/A 5/29/2018    Procedure: BRONCHOSCOPY; Bronchoscopy with BAL and transbronchial biopsies under general anesthesia;  Surgeon: Jake Alvarez MD;  Location: [facility] OR [address] [place] [address];  Service: Transplant;  Laterality: N/A;    BRONCHOSCOPY N/A 10/1/2018    Procedure: BRONCHOSCOPY;  Surgeon: Jake Alvarez MD;  Location: [facility] OR [address] [place] [address];  Service: Transplant;  Laterality: N/A;    BRONCHOSCOPY N/A 10/1/2018    Performed by Jake Alvarez MD at [facility] OR [address] [place] [address]    BRONCHOSCOPY Bilateral 12/20/2016    Performed  by Jake [facility]varez MD at [facility] OR 2ND [facility]    BRONCHOSCOPY - flexible bronchoscopy with probable tissue biopsy CPT 86293 N/A 7/21/2015    Performed by Jake Alvarez MD at [facility] OR [facility]    BRONCHOSCOPY - flexible bronchoscopy with probable tissue biospy CPT 59861 N/A 10/20/2015    Performed by Jake Alvarez MD at [facility] OR [facility]    BRONCHOSCOPY - flexible bronchoscopy with tissue biopsy CPT 26123 N/A 9/29/2015    Performed by Jake Alvarez MD at [facility] OR [facility]    BRONCHOSCOPY - flexible bronchoscopy with tissue biopsy CPT 01546 N/A 5/26/2015    Performed by Jake Alvarez MD at [facility] OR [facility]    BRONCHOSCOPY flexible bronchoscopy with tissue biopsy N/A 9/8/2014    Performed by Jake Alvarez MD at [facility] OR [facility]    BRONCHOSCOPY flexible with possible tissue biopsy N/A 8/8/2014    Performed by Jake Alvarez MD at [facility] OR [facility]    BRONCHOSCOPY, BAL, BIOPSIES N/A 3/20/2018    Performed by Jake Alvarez MD at [facility] OR [facility]    BRONCHOSCOPY-FIBEROPTIC N/A 1/29/2016    Performed by Joann Cifuentes DO at [facility] OR 2ND [facility]    BRONCHOSCOPY; Bronchoscopy with BAL and transbronchial biopsies under general anesthesia N/A 5/29/2018    [facility]    CHOLECYSTECTOMY  2016    CHOLECYSTECTOMY-LAPAROSCOPIC N/A 7/22/2016    Performed by Joshua Goldberg, MD at [facility] OR [facility]ND [facility]    ENDOMETRIAL ABLATION  2015    KJB    FESS      FESS Bilateral 10/21/2013    Performed by Jared Whatley MD at [facility] OR [facility]    FINE NEEDLE ASPIRATION (FNA)  of a cervical lymphadenopathy  1/29/2016    Performed by Joann Cifuentes DO at [facility] OR [facility]ND [facility]    flex bronchoscopy with probable tissue biopsy N/A 7/31/2014    Performed by [facility] Diagnostic Provider at [facility] OR 2ND [facility]    flexible bronchoscopy with tissue biopsy N/A 12/11/2014    Performed by Jake Alvarez MD at [facility] OR [address] [place] [address]    INJECTION-VOCAL CORD Left 6/24/2014    Performed by Jared PAZ  MD [facility] at [facility] OR [address] [place] [address]    [id number] to right chest and removal of portacath to left chests wall. Bilateral 6/24/2014    Performed by Zhen Dorado MD at [facility] OR [address] [place] [address]    LARYNX SURGERY  2016    LUNG TRANSPLANT Bilateral 6/12/14    MYRINGOTOMY W/ TUBES Right 04/2017    MYRINGOTOMY WITH INSERTION OF PE TUBES Right 4/15/2017    Performed by Gary Null MD at [facility] OR [address] [place] [address]    PLACEMENT-TUBE-PEG  5/15/2014    Performed by David Moses MD at [facility] OR [address] [place] [address]    PORTACATH PLACEMENT Right     rt sc    REMOVAL-TUBE-PEG  5/15/2014    Performed by David Moses MD at [facility] OR [address] [place] [address]    ROBOTIC ASSISTED LAPAROSCOPIC HYSTERECTOMY N/A 4/25/2017    Performed by Deny Dias Jr., MD at [facility] OR    SALPINGECTOMY Bilateral 2015    KJB    SALPINGECTOMY-LAPAROSCOPIC Bilateral 5/19/2015    Performed by Deny Dias Jr., MD at [facility] OR    SINUS SURGERY FUNCTIONAL ENDOSCOPIC WITH NAVIGATION Bilateral 4/3/2017    Performed by Cesar Olsen MD at [facility] OR [address] [place] [address]    SINUS SURGERY FUNCTIONAL ENDOSCOPIC WITH NAVIGATION, 27822, 12964, 31028, 63058 Bilateral 2/5/2015    Performed by Cesar Olsen MD at [facility] OR [address] [place] [address]    THYROPLASTY - Medialization laryngoplasty, cricothyroid subluxation, arytenoid repositioning Left 8/2/2016    Performed by Gary Null MD at [facility] OR [address] [place] [address]    TRANSPLANT-LUNG Bilateral 6/11/2014    Performed by Adolfo Huston MD at [facility] OR [address] [place] [address]       Family history of liver disease: No    Review of patient's allergies indicates:   Allergen Reactions    Albuterol Palpitations    Colistin Anaphylaxis    Vancomycin analogues      Infusion reaction that does not resolve with slowing    Neupogen [filgrastim] Other (See Comments)     Ostealgia after five daily doses of 300 mcg.      Bactrim [sulfamethoxazole-trimethoprim] Hives    Ceftazidime Hives     Pt stated can tolerate cefapine not ceftazidime    Ceftazidime     Dronabinol Other (See  Comments)     Mental changes/hallucinations    Haldol [haloperidol lactate] Other (See Comments)     Seizure like activity    Nsaids (non-steroidal anti-inflammatory drug)      Cannot have due to lung transplant    Adhesive Rash     Cloth tape- please use tegaderm or paper tape    Aztreonam Rash    Ciprofloxacin Nausea And Vomiting     Projectile N/V, per patient.  Unwilling to retry therapy.       Tobacco Use    Smoking status: Never Smoker    Smokeless tobacco: Never Used   Substance and Sexual Activity    Alcohol use: No     Comment: Has not had an alcoholic drink in more than 2 months.    Drug use: No    Sexual activity: Yes     Partners: Male     Birth control/protection: Implant       Medications Prior to Admission   Medication Sig Dispense Refill Last Dose    amLODIPine (NORVASC) 10 MG tablet Take 1 tablet (10 mg total) by mouth once daily. 30 tablet 11 Taking    aripiprazole (ABILIFY) 2 MG Tab Take 2 mg by mouth once daily.   Taking    calcium-vitamin D3 (OS-KATY 500 + D3) 500 mg(1,250mg) -200 unit per tablet Take 1 tablet by mouth 2 (two) times daily with meals. 60 tablet 11     dapsone 100 MG Tab Take 1 tablet (100 mg total) by mouth once daily. 30 tablet 11 Taking    diphenhydrAMINE (BENADRYL) 50 MG tablet Take 1 tablet (50 mg total) by mouth every 6 (six) hours as needed for Itching. 1 tablet 0 Taking    DULERA 100-5 mcg/actuation HFAA INHALE 1 PUFF BY MOUTH TWICE DAILY 13 g 0 Taking    fexofenadine (ALLEGRA) 180 MG tablet Take 180 mg by mouth once daily.   Taking    fluconazole (DIFLUCAN) 150 MG Tab TAKE 1 TABLET BY MOUTH EVERY WEEK 30 tablet 0 Not Taking    fluticasone (FLONASE) 50 mcg/actuation nasal spray INSERT 2 SPRAYS IN EACH NOSTRIL DAILY 16 g 5     folic acid (FOLVITE) 1 MG tablet Take 1 tablet (1,000 mcg total) by mouth once daily. 30 tablet 11 Taking    gabapentin (NEURONTIN) 300 MG capsule Take 1 capsule (300 mg total) by mouth 3 (three) times daily. 90 capsule 11 Taking     inhalation spacing device (PROCHAMBER) USE AS DIRECTED 1 Device 0     levalbuterol (XOPENEX) 1.25 mg/3 mL nebulizer solution Take 3 mLs (1.25 mg total) by nebulization every 8 (eight) hours as needed for Wheezing or Shortness of Breath. Rescue 75 mL 5     lipase-protease-amylase (CREON) 36,000-114,000- 180,000 unit CpDR Take 4 capsules by mouth 3 (three) times daily with meals. Take 3 with snacks prn. (Patient taking differently: Take 5 capsules by mouth 3 (three) times daily with meals. Take 3 with snacks prn.) 800 capsule 11 Taking    LORazepam (ATIVAN) 1 MG tablet Take 2 mg by mouth every 6 (six) hours as needed for Anxiety.    Taking    magnesium oxide (MAG-OX) 400 mg tablet Take 1 tablet (400 mg total) by mouth 2 (two) times daily. 60 tablet 11 Taking    metoprolol tartrate (LOPRESSOR) 25 MG tablet Take 1 tablet (25 mg total) by mouth 2 (two) times daily. 60 tablet 11 Taking    montelukast (SINGULAIR) 10 mg tablet Take 1 tablet (10 mg total) by mouth once daily. 30 tablet 11 Taking    morphine (MS CONTIN) 15 MG 12 hr tablet Take 15 mg by mouth 2 (two) times daily.    Taking    multivit,min52-folic-vitK-cQ10 (AQUADEKS) 100-700-10 mcg-mcg-mg Cap cap 1 tab twice daily 60 capsule 0 Taking    mycophenolate (CELLCEPT) 250 mg Cap Take 2 capsules (500 mg total) by mouth 2 (two) times daily. 120 capsule 11 Taking    nystatin (MYCOSTATIN) 100,000 unit/mL suspension Take 5 mLs (500,000 Units total) by mouth 3 (three) times daily. for 10 days 473 mL 1 Not Taking    omeprazole (PRILOSEC) 40 MG capsule Take 1 capsule (40 mg total) by mouth every morning. 30 capsule 11     ondansetron (ZOFRAN) 8 MG tablet TAKE 1 TABLET(8 MG) BY MOUTH EVERY 8 HOURS AS NEEDED FOR NAUSEA 30 tablet 0 Taking    oxycodone (ROXICODONE) 5 MG immediate release tablet Take 10 mg by mouth every 4 (four) hours as needed for Pain.   Taking    polyethylene glycol (GLYCOLAX) 17 gram PwPk Take 17 g by mouth 2 (two) times daily. 60 packet 11  Taking    predniSONE (DELTASONE) 5 MG tablet Take 1 tablet (5 mg total) by mouth once daily. 30 tablet 11 Taking    promethazine (PHENERGAN) 25 MG tablet TAKE 1 TABLET(25 MG) BY MOUTH EVERY 8 HOURS AS NEEDED 60 tablet 0 Taking    tacrolimus (PROGRAF) 0.5 MG Cap Take 1 capsule (0.5 mg total) by mouth once daily. 30 capsule 11 Taking    tacrolimus (PROGRAF) 1 MG Cap Daily doses: 2 mg in am, 1.5 mg in pm by mouth 90 capsule 11 Taking    tiZANidine (ZANAFLEX) 4 MG tablet TAKE 2 TABLETS BY MOUTH EVERY 6 HOURS AS NEEDED 90 tablet 0 Taking    topiramate (TOPAMAX) 25 MG tablet Take 1 tablet (25 mg total) by mouth 2 (two) times daily. 60 tablet 11 Taking    venlafaxine (EFFEXOR-XR) 75 MG 24 hr capsule Take 1 capsule (75 mg total) by mouth every evening. (Patient taking differently: Take 150 mg by mouth every evening. ) 30 capsule 11 Taking       Objective:     Vital Signs (Most Recent):  Temp: 98.7 °F (37.1 °C) (12/07/18 1259)  Pulse: 94 (12/07/18 1259)  Resp: 18 (12/07/18 1259)  BP: 117/70 (12/07/18 1259)  SpO2: (!) 91 % (12/07/18 1259) Vital Signs (24h Range):  Temp:  [98 °F (36.7 °C)-98.7 °F (37.1 °C)] 98.7 °F (37.1 °C)  Pulse:  [] 94  Resp:  [18-20] 18  SpO2:  [91 %-98 %] 91 %  BP: (111-119)/(68-82) 117/70     Weight: 56 kg (123 lb 7.3 oz) (12/07/18 0600)  Body mass index is 24.11 kg/m².    Physical Exam   Constitutional: She is oriented to person, place, and time. No distress.   HENT:   Mouth/Throat: Oropharynx is clear and moist.   Eyes: No scleral icterus.   Cardiovascular: Normal rate and regular rhythm.   Pulmonary/Chest: No respiratory distress.   Abdominal: Soft. Bowel sounds are normal. She exhibits no distension. There is no tenderness.   Musculoskeletal: She exhibits no edema or deformity.   Neurological: She is alert and oriented to person, place, and time.   Skin: Skin is warm and dry.   Psychiatric: She has a normal mood and affect.   Vitals reviewed.      MELD-Na score: 11 at 11/26/2018  9:06  AM  MELD score: 11 at 11/26/2018  9:06 AM  Calculated from:  Serum Creatinine: 1.6 mg/dL at 11/26/2018  9:06 AM  Serum Sodium: 141 mmol/L (Rounded to 137 mmol/L) at 11/26/2018  9:06 AM  Total Bilirubin: 0.4 mg/dL (Rounded to 1 mg/dL) at 11/26/2018  9:06 AM  INR(ratio): 1 at 11/26/2018  9:06 AM  Age: 28 years    Significant Labs:  CBC:   Recent Labs   Lab 12/07/18  0530   WBC 2.74*   RBC 2.39*   HGB 6.7*   HCT 23.0*   PLT 75*     CMP:   Recent Labs   Lab 12/07/18  0530      CALCIUM 8.9   ALBUMIN 3.4*   PROT 6.7      K 4.3   CO2 26      BUN 21*   CREATININE 1.2   ALKPHOS 192*   ALT 52*   AST 22   BILITOT 0.3     Coagulation: No results for input(s): PT, INR, APTT in the last 168 hours.

## 2018-12-07 NOTE — PROGRESS NOTES
[facility]  Lung Transplant  Progress Note - Floor    Patient Name: Juanita Ibarra  MRN: [id number]  Admission Date: 12/4/2018  Hospital Length of Stay: 3 days  Post-Operative Day: 1639  Attending Physician: Jake Alvarez MD  Primary Care Provider: Jake Alvarez MD     Subjective:     Interval History: No acute events overnight. Still with productive cough.  But does report feeling better today.     Continuous Infusions:  Scheduled Meds:   amLODIPine  10 mg Oral Daily    ARIPiprazole  2 mg Oral Daily    calcium-vitamin D3  1 tablet Oral BID WM    ceFEPime (MAXIPIME) IVPB  1 g Intravenous Q8H    cetirizine  5 mg Oral Daily    dapsone  100 mg Oral Daily    diphenhydrAMINE  50 mg Intravenous BID    enoxaparin  40 mg Subcutaneous Daily    fluticasone  1 spray Each Nare Daily    fluticasone-vilanterol  1 puff Inhalation Daily    folic acid  1,000 mcg Oral Daily    gabapentin  300 mg Oral TID    guaiFENesin  600 mg Oral BID    levalbuterol  1.25 mg Nebulization Q6H    lipase-protease-amylase 24,000-76,000-120,000 units  5 capsule Oral TID WM    magnesium oxide  400 mg Oral BID    metoprolol tartrate  25 mg Oral BID    montelukast  10 mg Oral Daily    morphine  15 mg Oral BID    multivit-min-FA-coenzyme Q10 100-5 mcg-mg  1 tablet Oral Daily    mycophenolate  500 mg Oral BID    nystatin  500,000 Units Oral QID    pantoprazole  40 mg Oral Daily    polyethylene glycol  17 g Oral BID    predniSONE  5 mg Oral Daily    tacrolimus  1.5 mg Oral Daily    tacrolimus  2 mg Oral Daily    topiramate  25 mg Oral BID    vancomycin (VANCOCIN) IVPB  750 mg Intravenous Q12H    venlafaxine  150 mg Oral QHS    venlafaxine  37.5 mg Oral QHS     PRN Meds:butalbital-acetaminophen-caffeine -40 mg, diphenhydrAMINE, heparin, porcine (PF), LORazepam, ondansetron, oxyCODONE, promethazine (PHENERGAN) IVPB, tiZANidine    Review of patient's allergies indicates:   Allergen  Reactions    Albuterol Palpitations    Colistin Anaphylaxis    Vancomycin analogues      Infusion reaction that does not resolve with slowing    Neupogen [filgrastim] Other (See Comments)     Ostealgia after five daily doses of 300 mcg.      Bactrim [sulfamethoxazole-trimethoprim] Hives    Ceftazidime Hives     Pt stated can tolerate cefapine not ceftazidime    Ceftazidime     Dronabinol Other (See Comments)     Mental changes/hallucinations    Haldol [haloperidol lactate] Other (See Comments)     Seizure like activity    Nsaids (non-steroidal anti-inflammatory drug)      Cannot have due to lung transplant    Adhesive Rash     Cloth tape- please use tegaderm or paper tape    Aztreonam Rash    Ciprofloxacin Nausea And Vomiting     Projectile N/V, per patient.  Unwilling to retry therapy.       Review of Systems   Constitutional: Positive for fatigue. Negative for activity change, appetite change, chills, diaphoresis, fever and unexpected weight change.   HENT: Positive for congestion. Negative for drooling, ear discharge, ear pain, facial swelling, hearing loss, sore throat and trouble swallowing.    Eyes: Negative for discharge and itching.   Respiratory: Positive for cough (intermittently productive), chest tightness and shortness of breath. Negative for apnea, choking, wheezing and stridor.    Cardiovascular: Positive for chest pain (pleuritic). Negative for palpitations and leg swelling.   Gastrointestinal: Negative for abdominal distention, abdominal pain, blood in stool and constipation.   Endocrine: Negative.    Genitourinary: Negative for difficulty urinating and dysuria.   Musculoskeletal: Positive for back pain. Negative for arthralgias, gait problem, joint swelling and myalgias.   Skin: Negative for color change and pallor.   Neurological: Negative for dizziness and headaches.   Hematological: Negative for adenopathy.   Psychiatric/Behavioral: Negative for agitation, behavioral problems and  confusion.     Objective:   Physical Exam   Constitutional: She is oriented to person, place, and time. She appears well-developed and well-nourished.   HENT:   Head: Normocephalic and atraumatic.   Mouth/Throat: No oropharyngeal exudate.   Dry mucus membranes   Eyes: EOM are normal. Pupils are equal, round, and reactive to light. Right eye exhibits no discharge. No scleral icterus.   Neck: Normal range of motion. No thyromegaly present.   Cardiovascular: Normal rate and regular rhythm. Exam reveals no friction rub.   No murmur heard.  Pulmonary/Chest: Effort normal. No stridor. No respiratory distress. She has wheezes (scant, expiratory). She has rhonchi (diffuse). She has no rales.   Abdominal: Soft. She exhibits no distension. There is no tenderness. There is no guarding.   Musculoskeletal: She exhibits no edema or tenderness.   Neurological: She is alert and oriented to person, place, and time.   Skin: Skin is warm and dry.   Psychiatric: She has a normal mood and affect.         Vital Signs (Most Recent):  Temp: 98.3 °F (36.8 °C) (12/07/18 0845)  Pulse: 85 (12/07/18 0914)  Resp: 20 (12/07/18 0914)  BP: 119/82 (12/07/18 0845)  SpO2: 96 % (12/07/18 0914) Vital Signs (24h Range):  Temp:  [98 °F (36.7 °C)-98.8 °F (37.1 °C)] 98.3 °F (36.8 °C)  Pulse:  [74-98] 85  Resp:  [18-20] 20  SpO2:  [93 %-99 %] 96 %  BP: (111-119)/(68-82) 119/82     Weight: 56 kg (123 lb 7.3 oz)  Body mass index is 24.11 kg/m².      Intake/Output Summary (Last 24 hours) at 12/7/2018 1038  Last data filed at 12/7/2018 0600  Gross per 24 hour   Intake 1910 ml   Output 0 ml   Net 1910 ml       Significant Labs:  CBC:  Recent Labs   Lab 12/07/18  0530   WBC 2.74*   RBC 2.39*   HGB 6.7*   HCT 23.0*   PLT 75*   MCV 96   MCH 28.0   MCHC 29.1*     BMP:  Recent Labs   Lab 12/07/18  0530      K 4.3      CO2 26   BUN 21*   CREATININE 1.2   CALCIUM 8.9      Tacrolimus Levels:  Recent Labs   Lab 12/07/18  0530   TACROLIMUS 3.2*      Microbiology:  Microbiology Results (last 7 days)     Procedure Component Value Units Date/Time    Culture, Respiratory with Gram Stain [[id number]] Collected:  12/06/18 0924    Order Status:  Completed Specimen:  Respiratory from Sputum, Expectorated Updated:  12/07/18 1015     Respiratory Culture --     GRAM NEGATIVE AUBREE  Rare  Identification and susceptibility pending  Normal respiratory anibal also present       Gram Stain (Respiratory) <10 epithelial cells per low power field.     Gram Stain (Respiratory) Many WBC's     Gram Stain (Respiratory) Moderate Gram positive rods     Gram Stain (Respiratory) Moderate Gram negative rods     Gram Stain (Respiratory) Moderate budding yeast    Blood culture [[id number]] Collected:  12/04/18 2151    Order Status:  Completed Specimen:  Blood from Peripheral, Antecubital, Right Updated:  12/06/18 2312     Blood Culture, Routine No Growth to date     Blood Culture, Routine No Growth to date     Blood Culture, Routine No Growth to date    Blood Culture #1 **CANNOT BE ORDERED STAT** [[id number]] Collected:  12/04/18 2120    Order Status:  Completed Specimen:  Blood from Line, Subclavian, Right Updated:  12/06/18 2212     Blood Culture, Routine No Growth to date     Blood Culture, Routine No Growth to date     Blood Culture, Routine No Growth to date    Respiratory Viral Panel by PCR [facility]; Nasal Swab [[id number]] Collected:  12/05/18 0058    Order Status:  Completed Specimen:  Respiratory Updated:  12/06/18 1156     Respiratory Virus Panel, source Nasal Swab     RVP - Adenovirus Not Detected     Comment: Detects Serotypes B and E. Detection of Serotype C may   be limited. If Adenovirus infection is suspected and a   Not Detected result is returned the sample should be   re-tested for Adenovirus using an independent method  (e.g. [employer] Adenovirus Quantitative Real-Time  PCR test.          Enterovirus Not Detected     Comment: Cross-reactivity has been observed between  certain Rhinovirus  strains and the Enterovirus assay.          Human Bocavirus Not Detected     Human Coronavirus Not Detected     Comment: The Human Coronavirus assay detects Human coronavirus types  229E, OC43,NL63 and HKU1.          RVP - Human Metapneumovirus (hMPV) Not Detected     RVP - Influenza A Not Detected     Influenza A - A6C3-02 Not Detected     RVP - Influenza B Not Detected     Parainfluenza Not Detected     Respiratory Syncytial VirusVirus (RSV) A Not Detected     Comment: The Respiratory Syncytial Viral assay detects types A and B,  however it does not distinguish between the two.          RVP - Rhinovirus Not Detected     Comment: Cross-Reactivity has been observed between certain   Rhinovirus strains and the Enterovirus assay.  Target Enriched Mulitplex Polymerase Chain Reaction (TEM-PCR)  allows for the detection of multiple pathogens out of a single  reaction.  This test was developed and its performance   characteristics determined by [employer].  It has not   been cleared or approved by the U.S.Food and Drug Administration.  Results should be used in conjunction with clinical findings,   and should not form the sole basis for a diagnosis or treatment  decision.  TEM-PCR is a licensed technology of [employer].         Narrative:       Receiving Lab:->[facility]          Microbiology Results (last 7 days)     Procedure Component Value Units Date/Time    Culture, Respiratory with Gram Stain [[id number]] Collected:  12/06/18 0924    Order Status:  Completed Specimen:  Respiratory from Sputum, Expectorated Updated:  12/07/18 1015     Respiratory Culture --     GRAM NEGATIVE AUBREE  Rare  Identification and susceptibility pending  Normal respiratory anibal also present       Gram Stain (Respiratory) <10 epithelial cells per low power field.     Gram Stain (Respiratory) Many WBC's     Gram Stain (Respiratory) Moderate Gram positive rods     Gram Stain (Respiratory) Moderate Gram  negative rods     Gram Stain (Respiratory) Moderate budding yeast    Blood culture [[id number]] Collected:  12/04/18 2151    Order Status:  Completed Specimen:  Blood from Peripheral, Antecubital, Right Updated:  12/06/18 2312     Blood Culture, Routine No Growth to date     Blood Culture, Routine No Growth to date     Blood Culture, Routine No Growth to date    Blood Culture #1 **CANNOT BE ORDERED STAT** [[id number]] Collected:  12/04/18 2120    Order Status:  Completed Specimen:  Blood from Line, Subclavian, Right Updated:  12/06/18 2212     Blood Culture, Routine No Growth to date     Blood Culture, Routine No Growth to date     Blood Culture, Routine No Growth to date    Respiratory Viral Panel by PCR [facility]; Nasal Swab [[id number]] Collected:  12/05/18 0058    Order Status:  Completed Specimen:  Respiratory Updated:  12/06/18 1156     Respiratory Virus Panel, source Nasal Swab     RVP - Adenovirus Not Detected     Comment: Detects Serotypes B and E. Detection of Serotype C may   be limited. If Adenovirus infection is suspected and a   Not Detected result is returned the sample should be   re-tested for Adenovirus using an independent method  (e.g. [employer] Adenovirus Quantitative Real-Time  PCR test.          Enterovirus Not Detected     Comment: Cross-reactivity has been observed between certain Rhinovirus  strains and the Enterovirus assay.          Human Bocavirus Not Detected     Human Coronavirus Not Detected     Comment: The Human Coronavirus assay detects Human coronavirus types  229E, OC43,NL63 and HKU1.          RVP - Human Metapneumovirus (hMPV) Not Detected     RVP - Influenza A Not Detected     Influenza A - Q5G7-53 Not Detected     RVP - Influenza B Not Detected     Parainfluenza Not Detected     Respiratory Syncytial VirusVirus (RSV) A Not Detected     Comment: The Respiratory Syncytial Viral assay detects types A and B,  however it does not distinguish between the two.          RVP -  Rhinovirus Not Detected     Comment: Cross-Reactivity has been observed between certain   Rhinovirus strains and the Enterovirus assay.  Target Enriched Mulitplex Polymerase Chain Reaction (TEM-PCR)  allows for the detection of multiple pathogens out of a single  reaction.  This test was developed and its performance   characteristics determined by [employer].  It has not   been cleared or approved by the U.S.Food and Drug Administration.  Results should be used in conjunction with clinical findings,   and should not form the sole basis for a diagnosis or treatment  decision.  TEM-PCR is a licensed technology of [employer].         Narrative:       Receiving Lab:->[facility]        I have reviewed all pertinent labs within the past 24 hours.        Assessment/Plan:     * Complication of lung transplant    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 Concerns for progression of her CARLOS EDUARDO versus infectious etiology. RVP, sputum culture ordered. Procalcitonin negative. CXR without new infiltrates. Troponin and BNP unremarkable. CT chest 12/5 - GGOs relatively unchanged from prior examination. TTE on 12/5 with EF of 55%, mild left atrial enlargement, PAP 26, mild tricuspid regurg, normal LV/RV function.      Continue immunosuppression and ppx. Continue oxygen supplementation to maintain goal O2 sats >88%. RVP negative so Tamiflu stopped.  Continue empiric Cefepime/Vanc. Will follow up on cultures. ID consulted, appreciate recs.      Bronchiolitis obliterans syndrome    Patient with persistent decline on outpatient PFTs. Concerns for progression of CARLOS EDUARDO versus infectious etiology. Will continue oxygen supplementation to maintain O2 sats >88%.  Reports feeling better today.  Currently on room air.       Immunosuppression    Continue outpatient regimen of  prednisone 5 mg daily,  mg BID, and tacrolimus 2 mg in the AM, and 1.5 in PM. Will monitor daily tacrolimus levels and adjust dose as needed.      Prophylactic antibiotic    Continue dapsone 100 mg daily.      Anxiety disorder    Continue current outpatient regimen with Effexor, Abilify, and ativan.      CF related Pancreatic insufficiency    Continue creon with meals and snacks.      Chronic pain with opiate use    Continue current outpatient pain regimen.      Transaminitis    LFTs noted to be >3 x ULN on admission. Hepatitis panel and liver US unremarkable, but with splenomegaly. Likely CF and medication related given her recent history of headaches and tylenol/fioricet use. Improving since admission, but will continue to trend.  Hepatology consulted and Liver elastography ordered.          Reyes Will NP  Lung Transplant  [facility]

## 2018-12-07 NOTE — HPI
This is a 27 yo F with cystic fibrosis who underwent bilateral lung transplant in 2014 who is MMF/tacro/pred who presented on 12/4/18 for dry cough, shortness of breath, and chest congestion. She is currently being treated with broad spectrum antibiotics. Hepatology being consulted for elevated liver enzymes and splenomegaly seen on imaging. Patient denies any known history of liver disease. Her TB and INR have been normal. Her AST/ALT acutely michael from 12/4 to 12/5 from normal to 239/124. Her AST/ALT have downtrended now to 56/80. Abd US showed normal liver, no ascites, but splenomegaly. Patient reports feeling better today.

## 2018-12-07 NOTE — PROGRESS NOTES
[facility]  Infectious Disease  Progress Note    Patient Name: Juanita Ibarra  MRN: [id number]  Admission Date: 12/4/2018  Length of Stay: 3 days  Attending Physician: Jake Alvarez MD  Primary Care Provider: Jake Alvarez MD    Isolation Status: No active isolations  Assessment/Plan:      Bronchiolitis obliterans syndrome    - 29 y/o female PMHx: 6/12/18 CMV D+/R- simulect induction tacro/mmf/pred complicated with rejection A1 8/2014 and CMV reactivation.   - Last admit on 11/7/18 for LISBETH and polypharmacy toxicity.   - Now here with SOB and cough.   - CT chest with no obvious signs of consolidation or effusion, some inflammation of the bronchus intermedius.   - Respiratory culture gram stain remarkable for yeast, GNR and GPR. Now with GNR positive culture pending ID and sensitivities   - Previous BAL cultures have been positive for C glabrata, C dubliniensis, Pseudomonas, MRSA.   - Unclear if new respiratory culture will represent colonizing organisms or new infection.   - RVP was negative, discontinued oseltamivir.  - Recommend to D/C vancomycin in setting of no new culture positive for MRSA and no evidence of pneumonia.  - continue cefepime for now pending sensitivities and ID of GNR in respiratory cultures.     Recommendations:   - Continue cefepime   - discontinue vancomycin                     Anticipated Disposition:   Thank you for your consult. I will follow-up with patient. Please contact us if you have any additional questions.    José Miguel Juarez MD  Infectious Disease  [facility]    Subjective:     Principal Problem:Complication of lung transplant    HPI: Case of 29 y/o female PMHx: 6/12/18 CMV D+/R- simulect induction tacro/mmf/pred complicated with rejection A1 8/2014 and CMV reactivation. Last admit on 11/7/18 for LISBETH and polypharmacy toxicity. Presents on 12/4/18 with c/c of chest congestion and cough for one week evolution. Patient  also mentioned using home oxygen more often. Patient denied recent travel, sick contacts, nausea, emesis or diarrhea. Was started empirically on cefepime, vanc and oseltamivir. ID consulted for antibiotic recommendations.       Review of Systems   Constitutional: Positive for fatigue. Negative for chills and fever.   Respiratory: Positive for cough and shortness of breath.    Gastrointestinal: Negative for abdominal distention, abdominal pain, diarrhea, nausea and vomiting.     Objective:     Vital Signs (Most Recent):  Temp: 98.7 °F (37.1 °C) (12/07/18 1259)  Pulse: 94 (12/07/18 1259)  Resp: 18 (12/07/18 1259)  BP: 117/70 (12/07/18 1259)  SpO2: (!) 91 % (12/07/18 1259) Vital Signs (24h Range):  Temp:  [98 °F (36.7 °C)-98.7 °F (37.1 °C)] 98.7 °F (37.1 °C)  Pulse:  [] 94  Resp:  [18-20] 18  SpO2:  [91 %-98 %] 91 %  BP: (111-119)/(68-82) 117/70     Weight: 56 kg (123 lb 7.3 oz)  Body mass index is 24.11 kg/m².    Estimated Creatinine Clearance: 54.3 mL/min (based on SCr of 1.2 mg/dL).    Physical Exam   Constitutional: She is oriented to person, place, and time. She appears well-developed and well-nourished.   HENT:   Head: Normocephalic and atraumatic.   Eyes: EOM are normal. Pupils are equal, round, and reactive to light.   Neck: Normal range of motion.   Cardiovascular: Normal rate, regular rhythm and normal heart sounds.   Pulmonary/Chest: Effort normal. She has wheezes.   Abdominal: Soft. Bowel sounds are normal. She exhibits no distension. There is no tenderness.   Musculoskeletal: Normal range of motion. She exhibits no edema.   Neurological: She is alert and oriented to person, place, and time.   Skin: No rash noted.       Significant Labs:   Blood Culture:   Recent Labs   Lab 11/07/18  0200 11/07/18  0252 12/04/18 2120 12/04/18 2151   LABBLOO No growth after 5 days. No growth after 5 days. No Growth to date  No Growth to date  No Growth to date No Growth to date  No Growth to date  No Growth to  date     BMP:   Recent Labs   Lab 12/07/18  0530         K 4.3      CO2 26   BUN 21*   CREATININE 1.2   CALCIUM 8.9     CBC:   Recent Labs   Lab 12/06/18  0500 12/07/18  0530   WBC 2.63* 2.74*   HGB 7.4* 6.7*   HCT 25.3* 23.0*   PLT 82* 75*     CMP:   Recent Labs   Lab 12/06/18  0500 12/07/18  0530    140   K 4.1 4.3    106   CO2 25 26    100   BUN 18 21*   CREATININE 1.3 1.2   CALCIUM 8.6* 8.9   PROT 6.6 6.7   ALBUMIN 3.4* 3.4*   BILITOT 0.3 0.3   ALKPHOS 222* 192*   AST 45* 22   ALT 72* 52*   ANIONGAP 8 8   EGFRNONAA 55.6* >60.0     Microbiology Results (last 7 days)     Procedure Component Value Units Date/Time    Culture, Respiratory with Gram Stain [[id number]] Collected:  12/06/18 0924    Order Status:  Completed Specimen:  Respiratory from Sputum, Expectorated Updated:  12/07/18 1015     Respiratory Culture --     GRAM NEGATIVE AUBREE  Rare  Identification and susceptibility pending  Normal respiratory anibal also present       Gram Stain (Respiratory) <10 epithelial cells per low power field.     Gram Stain (Respiratory) Many WBC's     Gram Stain (Respiratory) Moderate Gram positive rods     Gram Stain (Respiratory) Moderate Gram negative rods     Gram Stain (Respiratory) Moderate budding yeast    Blood culture [[id number]] Collected:  12/04/18 2151    Order Status:  Completed Specimen:  Blood from Peripheral, Antecubital, Right Updated:  12/06/18 2312     Blood Culture, Routine No Growth to date     Blood Culture, Routine No Growth to date     Blood Culture, Routine No Growth to date    Blood Culture #1 **CANNOT BE ORDERED STAT** [[id number]] Collected:  12/04/18 2120    Order Status:  Completed Specimen:  Blood from Line, Subclavian, Right Updated:  12/06/18 2212     Blood Culture, Routine No Growth to date     Blood Culture, Routine No Growth to date     Blood Culture, Routine No Growth to date    Respiratory Viral Panel by PCR [facility]; Nasal Swab [[id number]] Collected:   12/05/18 0058    Order Status:  Completed Specimen:  Respiratory Updated:  12/06/18 1156     Respiratory Virus Panel, source Nasal Swab     RVP - Adenovirus Not Detected     Comment: Detects Serotypes B and E. Detection of Serotype C may   be limited. If Adenovirus infection is suspected and a   Not Detected result is returned the sample should be   re-tested for Adenovirus using an independent method  (e.g. [employer] Adenovirus Quantitative Real-Time  PCR test.          Enterovirus Not Detected     Comment: Cross-reactivity has been observed between certain Rhinovirus  strains and the Enterovirus assay.          Human Bocavirus Not Detected     Human Coronavirus Not Detected     Comment: The Human Coronavirus assay detects Human coronavirus types  229E, OC43,NL63 and HKU1.          RVP - Human Metapneumovirus (hMPV) Not Detected     RVP - Influenza A Not Detected     Influenza A - U8F6-27 Not Detected     RVP - Influenza B Not Detected     Parainfluenza Not Detected     Respiratory Syncytial VirusVirus (RSV) A Not Detected     Comment: The Respiratory Syncytial Viral assay detects types A and B,  however it does not distinguish between the two.          RVP - Rhinovirus Not Detected     Comment: Cross-Reactivity has been observed between certain   Rhinovirus strains and the Enterovirus assay.  Target Enriched Mulitplex Polymerase Chain Reaction (TEM-PCR)  allows for the detection of multiple pathogens out of a single  reaction.  This test was developed and its performance   characteristics determined by [employer].  It has not   been cleared or approved by the U.S.Food and Drug Administration.  Results should be used in conjunction with clinical findings,   and should not form the sole basis for a diagnosis or treatment  decision.  TEM-PCR is a licensed technology of [employer].         Narrative:       Receiving Lab:->[facility]          Significant Imaging: I have reviewed all pertinent  imaging results/findings within the past 24 hours.

## 2018-12-07 NOTE — CONSULTS
[facility]  Hepatology  Consult Note    Patient Name: Juanita Ibarra  MRN: [id number]  Admission Date: 12/4/2018  Hospital Length of Stay: 3 days  Attending Provider: Jake Alvarez MD   Primary Care Physician: Jake Alvarez MD  Principal Problem:Complication of lung transplant    Inpatient consult to Hepatology  Consult performed by: Holger Will MD  Consult ordered by: Reyes Will NP        Subjective:     Transplant status: No    HPI:  This is a 27 yo F with cystic fibrosis who underwent bilateral lung transplant in 2014 who is MMF/tacro/pred who presented on 12/4/18 for dry cough, shortness of breath, and chest congestion. She is currently being treated with broad spectrum antibiotics. Hepatology being consulted for elevated liver enzymes and splenomegaly seen on imaging. Patient denies any known history of liver disease. Her TB and INR have been normal. Her AST/ALT acutely michael from 12/4 to 12/5 from normal to 239/124. Her AST/ALT have downtrended now to 56/80. Abd US showed normal liver, no ascites, but splenomegaly. Patient reports feeling better today.      Review of Systems   Constitutional: Negative for chills and fever.   HENT: Negative for sore throat and trouble swallowing.    Respiratory: Positive for cough. Negative for shortness of breath.    Cardiovascular: Negative for chest pain and leg swelling.   Gastrointestinal: Negative for abdominal distention, abdominal pain, constipation, diarrhea, nausea and vomiting.   Genitourinary: Negative for decreased urine volume and difficulty urinating.   Musculoskeletal: Negative for arthralgias and back pain.   Skin: Negative for color change and pallor.   Neurological: Negative for dizziness and light-headedness.   Psychiatric/Behavioral: Negative for agitation and confusion.       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    Cystic fibrosis of the lung     Ear infection     Hypertension     MRSA (methicillin resistant Staphylococcus aureus) carrier     Osteopenia     Other specified disease of pancreas     Pain disorder     Seizures     Sinusitis, chronic     Vocal cord paralysis 06/2014    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       Past Surgical History:   Procedure Laterality Date    ABDOMINAL SURGERY      peg tube     PGLUOVJR-WLXOCBAQPRR-FWKFTONYMCOX N/A 5/19/2015    Performed by Deny Dias Jr., [facility] at [facility] OR    BIOPSY-BRONCHUS N/A 12/20/2016    Performed by Jake Alvarez MD at [facility] OR 2ND FLR    BRONCHOSCOPY N/A 5/29/2018    Procedure: BRONCHOSCOPY; Bronchoscopy with BAL and transbronchial biopsies under general anesthesia;  Surgeon: Jake Alvarez MD;  Location: [facility] OR [facility] FLR;  Service: Transplant;  Laterality: N/A;    BRONCHOSCOPY N/A 10/1/2018    Procedure: BRONCHOSCOPY;  Surgeon: Jake Alvarez MD;  Location: [facility] OR 2ND FLR;  Service: Transplant;  Laterality: N/A;    BRONCHOSCOPY N/A 10/1/2018    Performed by Jake Alvarez MD at [facility] OR 2ND FLR    BRONCHOSCOPY Bilateral 12/20/2016    Performed by Jake Alvarez MD at [facility] OR 2ND FLR    BRONCHOSCOPY - flexible bronchoscopy with probable tissue biopsy CPT 81669 N/A 7/21/2015    Performed by Jkae Alvarez MD at [facility] OR 2ND FLR    BRONCHOSCOPY - flexible bronchoscopy with probable tissue biospy CPT 99726 N/A 10/20/2015    Performed by Jake Alvarez MD at [facility] OR 2ND FLR    BRONCHOSCOPY - flexible bronchoscopy with tissue biopsy CPT 02138 N/A 9/29/2015    Performed by Jake Alvarez MD at [facility] OR 2ND FLR    BRONCHOSCOPY - flexible bronchoscopy with tissue biopsy CPT 44650 N/A 5/26/2015    Performed by Jake Alvarez MD at [facility] OR 1ST FLR    BRONCHOSCOPY flexible bronchoscopy with tissue biopsy N/A 9/8/2014    Performed by Jaek Alvarez MD at [facility] OR 2ND FLR    BRONCHOSCOPY flexible with possible tissue biopsy N/A  8/8/2014    [facility]    BRONCHOSCOPY, BAL, BIOPSIES N/A 3/20/2018    Performed by Jake Alvarez MD at [facility] OR [address] [place] [address]    BRONCHOSCOPY-FIBEROPTIC N/A 1/29/2016    Performed by Joann Cifuentes DO at [facility]    BRONCHOSCOPY; Bronchoscopy with BAL and transbronchial biopsies under general anesthesia N/A 5/29/2018    Performed by Jake Alvarez MD at [facility] OR [address] [place] [address]    CHOLECYSTECTOMY  2016    CHOLECYSTECTOMY-LAPAROSCOPIC N/A 7/22/2016    Performed by Joshua Goldberg, MD at [facility] OR [address] [place] [address]    ENDOMETRIAL ABLATION  2015    KJB    FESS      FESS Bilateral 10/21/2013    Performed by Jared Whatley MD at [facility] OR [address] [place] [address]    FINE NEEDLE ASPIRATION (FNA)  of a cervical lymphadenopathy  1/29/2016    Performed by Joann Cifuentes DO at [facility] OR [address] [place] [address]    flex bronchoscopy with probable tissue biopsy N/A 7/31/2014    Performed by [facility] at [facility] OR [address] [place] [address]    flexible bronchoscopy with tissue biopsy N/A 12/11/2014    Performed by Jake Alvarez MD at [facility] OR [address] [place] [address]    INJECTION-VOCAL CORD Left 6/24/2014    Performed by Jared Whatley MD at [facility] OR [address] [place] [address]    KMDYWTNUO-KHQN-Z-CATH to right chest and removal of portacath to left chests wall. Bilateral 6/24/2014    Performed by Zhen Dorado MD at [facility] OR [address] [place] [address]    LARYNX SURGERY  2016    LUNG TRANSPLANT Bilateral 6/12/14    MYRINGOTOMY W/ TUBES Right 04/2017    MYRINGOTOMY WITH INSERTION OF PE TUBES Right 4/15/2017    Performed by Gary Null MD at [facility] OR [facility]    PLACEMENT-TUBE-PEG  5/15/2014    Performed by David Moses MD at [facility]    PORTACATH PLACEMENT Right     rt sc    REMOVAL-TUBE-PEG  5/15/2014    Performed by David Moses MD at [facility]    ROBOTIC ASSISTED LAPAROSCOPIC HYSTERECTOMY N/A 4/25/2017    Performed by [facility]y Dias Jr[facility] MD at [facility] OR    SALPINGECTOMY Bilateral 2015    KJB     SALPINGECTOMY-LAPAROSCOPIC Bilateral 5/19/2015    Performed by [facility]y Dias Jr., MD at [facility] OR    SINUS SURGERY FUNCTIONAL ENDOSCOPIC WITH NAVIGATION Bilateral 4/3/2017    Performed by Cesar Olsen MD at [facility] OR [address] [place] [address]    SINUS SURGERY FUNCTIONAL ENDOSCOPIC WITH NAVIGATION, 98419, 90225, 44633, 76328 Bilateral 2/5/2015    Performed by Cesar Olsen MD at [facility] OR [address] [place] [address]    THYROPLASTY - Medialization laryngoplasty, cricothyroid subluxation, arytenoid repositioning Left 8/2/2016    Performed by Gary Null MD at [facility] OR [address] [place] [address]    TRANSPLANT-LUNG Bilateral 6/11/2014    Performed by Adolfo Huston MD at [facility] OR [address] [place] [address]       Family history of liver disease: No    Review of patient's allergies indicates:   Allergen Reactions    Albuterol Palpitations    Colistin Anaphylaxis    Vancomycin analogues      Infusion reaction that does not resolve with slowing    Neupogen [filgrastim] Other (See Comments)     Ostealgia after five daily doses of 300 mcg.      Bactrim [sulfamethoxazole-trimethoprim] Hives    Ceftazidime Hives     Pt stated can tolerate cefapine not ceftazidime    Ceftazidime     Dronabinol Other (See Comments)     Mental changes/hallucinations    Haldol [haloperidol lactate] Other (See Comments)     Seizure like activity    Nsaids (non-steroidal anti-inflammatory drug)      Cannot have due to lung transplant    Adhesive Rash     Cloth tape- please use tegaderm or paper tape    Aztreonam Rash    Ciprofloxacin Nausea And Vomiting     Projectile N/V, per patient.  Unwilling to retry therapy.       Tobacco Use    Smoking status: Never Smoker    Smokeless tobacco: Never Used   Substance and Sexual Activity    Alcohol use: No     Comment: Has not had an alcoholic drink in more than 2 months.    Drug use: No    Sexual activity: Yes     Partners: Male     Birth control/protection: Implant       Medications Prior to Admission   Medication Sig Dispense Refill Last Dose     amLODIPine (NORVASC) 10 MG tablet Take 1 tablet (10 mg total) by mouth once daily. 30 tablet 11 Taking    aripiprazole (ABILIFY) 2 MG Tab Take 2 mg by mouth once daily.   Taking    calcium-vitamin D3 (OS-KATY 500 + D3) 500 mg(1,250mg) -200 unit per tablet Take 1 tablet by mouth 2 (two) times daily with meals. 60 tablet 11     dapsone 100 MG Tab Take 1 tablet (100 mg total) by mouth once daily. 30 tablet 11 Taking    diphenhydrAMINE (BENADRYL) 50 MG tablet Take 1 tablet (50 mg total) by mouth every 6 (six) hours as needed for Itching. 1 tablet 0 Taking    DULERA 100-5 mcg/actuation HFAA INHALE 1 PUFF BY MOUTH TWICE DAILY 13 g 0 Taking    fexofenadine (ALLEGRA) 180 MG tablet Take 180 mg by mouth once daily.   Taking    fluconazole (DIFLUCAN) 150 MG Tab TAKE 1 TABLET BY MOUTH EVERY WEEK 30 tablet 0 Not Taking    fluticasone (FLONASE) 50 mcg/actuation nasal spray INSERT 2 SPRAYS IN EACH NOSTRIL DAILY 16 g 5     folic acid (FOLVITE) 1 MG tablet Take 1 tablet (1,000 mcg total) by mouth once daily. 30 tablet 11 Taking    gabapentin (NEURONTIN) 300 MG capsule Take 1 capsule (300 mg total) by mouth 3 (three) times daily. 90 capsule 11 Taking    inhalation spacing device (PROCHAMBER) USE AS DIRECTED 1 Device 0     levalbuterol (XOPENEX) 1.25 mg/3 mL nebulizer solution Take 3 mLs (1.25 mg total) by nebulization every 8 (eight) hours as needed for Wheezing or Shortness of Breath. Rescue 75 mL 5     lipase-protease-amylase (CREON) 36,000-114,000- 180,000 unit CpDR Take 4 capsules by mouth 3 (three) times daily with meals. Take 3 with snacks prn. (Patient taking differently: Take 5 capsules by mouth 3 (three) times daily with meals. Take 3 with snacks prn.) 800 capsule 11 Taking    LORazepam (ATIVAN) 1 MG tablet Take 2 mg by mouth every 6 (six) hours as needed for Anxiety.    Taking    magnesium oxide (MAG-OX) 400 mg tablet Take 1 tablet (400 mg total) by mouth 2 (two) times daily. 60 tablet 11 Taking    metoprolol  tartrate (LOPRESSOR) 25 MG tablet Take 1 tablet (25 mg total) by mouth 2 (two) times daily. 60 tablet 11 Taking    montelukast (SINGULAIR) 10 mg tablet Take 1 tablet (10 mg total) by mouth once daily. 30 tablet 11 Taking    morphine (MS CONTIN) 15 MG 12 hr tablet Take 15 mg by mouth 2 (two) times daily.    Taking    multivit,min52-folic-vitK-cQ10 (AQUADEKS) 100-700-10 mcg-mcg-mg Cap cap 1 tab twice daily 60 capsule 0 Taking    mycophenolate (CELLCEPT) 250 mg Cap Take 2 capsules (500 mg total) by mouth 2 (two) times daily. 120 capsule 11 Taking    nystatin (MYCOSTATIN) 100,000 unit/mL suspension Take 5 mLs (500,000 Units total) by mouth 3 (three) times daily. for 10 days 473 mL 1 Not Taking    omeprazole (PRILOSEC) 40 MG capsule Take 1 capsule (40 mg total) by mouth every morning. 30 capsule 11     ondansetron (ZOFRAN) 8 MG tablet TAKE 1 TABLET(8 MG) BY MOUTH EVERY 8 HOURS AS NEEDED FOR NAUSEA 30 tablet 0 Taking    oxycodone (ROXICODONE) 5 MG immediate release tablet Take 10 mg by mouth every 4 (four) hours as needed for Pain.   Taking    polyethylene glycol (GLYCOLAX) 17 gram PwPk Take 17 g by mouth 2 (two) times daily. 60 packet 11 Taking    predniSONE (DELTASONE) 5 MG tablet Take 1 tablet (5 mg total) by mouth once daily. 30 tablet 11 Taking    promethazine (PHENERGAN) 25 MG tablet TAKE 1 TABLET(25 MG) BY MOUTH EVERY 8 HOURS AS NEEDED 60 tablet 0 Taking    tacrolimus (PROGRAF) 0.5 MG Cap Take 1 capsule (0.5 mg total) by mouth once daily. 30 capsule 11 Taking    tacrolimus (PROGRAF) 1 MG Cap Daily doses: 2 mg in am, 1.5 mg in pm by mouth 90 capsule 11 Taking    tiZANidine (ZANAFLEX) 4 MG tablet TAKE 2 TABLETS BY MOUTH EVERY 6 HOURS AS NEEDED 90 tablet 0 Taking    topiramate (TOPAMAX) 25 MG tablet Take 1 tablet (25 mg total) by mouth 2 (two) times daily. 60 tablet 11 Taking    venlafaxine (EFFEXOR-XR) 75 MG 24 hr capsule Take 1 capsule (75 mg total) by mouth every evening. (Patient taking  differently: Take 150 mg by mouth every evening. ) 30 capsule 11 Taking       Objective:     Vital Signs (Most Recent):  Temp: 98.7 °F (37.1 °C) (12/07/18 1259)  Pulse: 94 (12/07/18 1259)  Resp: 18 (12/07/18 1259)  BP: 117/70 (12/07/18 1259)  SpO2: (!) 91 % (12/07/18 1259) Vital Signs (24h Range):  Temp:  [98 °F (36.7 °C)-98.7 °F (37.1 °C)] 98.7 °F (37.1 °C)  Pulse:  [] 94  Resp:  [18-20] 18  SpO2:  [91 %-98 %] 91 %  BP: (111-119)/(68-82) 117/70     Weight: 56 kg (123 lb 7.3 oz) (12/07/18 0600)  Body mass index is 24.11 kg/m².    Physical Exam   Constitutional: She is oriented to person, place, and time. No distress.   HENT:   Mouth/Throat: Oropharynx is clear and moist.   Eyes: No scleral icterus.   Cardiovascular: Normal rate and regular rhythm.   Pulmonary/Chest: No respiratory distress.   Abdominal: Soft. Bowel sounds are normal. She exhibits no distension. There is no tenderness.   Musculoskeletal: She exhibits no edema or deformity.   Neurological: She is alert and oriented to person, place, and time.   Skin: Skin is warm and dry.   Psychiatric: She has a normal mood and affect.   Vitals reviewed.      MELD-Na score: 11 at 11/26/2018  9:06 AM  MELD score: 11 at 11/26/2018  9:06 AM  Calculated from:  Serum Creatinine: 1.6 mg/dL at 11/26/2018  9:06 AM  Serum Sodium: 141 mmol/L (Rounded to 137 mmol/L) at 11/26/2018  9:06 AM  Total Bilirubin: 0.4 mg/dL (Rounded to 1 mg/dL) at 11/26/2018  9:06 AM  INR(ratio): 1 at 11/26/2018  9:06 AM  Age: 28 years    Significant Labs:  CBC:   Recent Labs   Lab 12/07/18  0530   WBC 2.74*   RBC 2.39*   HGB 6.7*   HCT 23.0*   PLT 75*     CMP:   Recent Labs   Lab 12/07/18  0530      CALCIUM 8.9   ALBUMIN 3.4*   PROT 6.7      K 4.3   CO2 26      BUN 21*   CREATININE 1.2   ALKPHOS 192*   ALT 52*   AST 22   BILITOT 0.3     Coagulation: No results for input(s): PT, INR, APTT in the last 168 hours.        Assessment/Plan:     Transaminitis    29 yo F with cystic  fibrosis who underwent bilateral lung transplant in 2014 admitted for shortness of breath, cough, and chest congestion being treated with broad spectrum antibiotics. She developed elevated AST/ALT on hospital day 2 which is likely a non-specific elevation in the setting of infection. She has normal TB and INR which suggest her liver is working well. Imaging is not concerning for cirrhosis or liver disease. Isolated splenomegaly can be seen in cystic fibrosis patients. Would recommend against liver biopsy at this time given acute illness, however can obtain US elastography to estimate amount of fibrosis, if any, of the liver.    Recommendations:  - Liver enzymes have trended down, with normal TB and INR  - Obtain US elastography liver  - Daily CBC, CMP, INR         Thank you for your consult.     Holger Will MD  Hepatology  [facility]

## 2018-12-07 NOTE — PLAN OF CARE
Problem: Patient Care Overview  Goal: Plan of Care Review  Outcome: Ongoing (interventions implemented as appropriate)  Pt AAOx4. Pt free from falls. Pt wears non slip socks when ambulating. Pt bed low and locked position. Pt afebrile. Pt IV site without redness or edema. Educated pt on importance of hand washing. Pt has requested multiple times to receive vancomycin early so she could go ahead and get Iv benadryl. It helps with nausea pt stated. Pt also requested oxy with morphine and was educated that her fioricet PRN was given with the scheduled morphine to help with the headache. Pt requested ocycodone later then requested ativan for later on. Pt states sshe wasnted vancomycin ran over 2 1/2 hours so as not to have a reaction. Pt was given benadryl to help with any discomfort of vanc administration. Pt stated that sometimes medications have a reaction in her line and the medications turn yellow or thick inside of line . Pt given education on medications being administered.

## 2018-12-07 NOTE — ASSESSMENT & PLAN NOTE
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   - Last admit on 11/7/18 for LISBETH and polypharmacy toxicity.   - Now here with SOB and cough.   - CT chest with no obvious signs of consolidation or effusion, some inflammation of the bronchus intermedius.   - Respiratory culture gram stain remarkable for yeast, GNR and GPR. Now with GNR positive culture pending ID and sensitivities   - Previous BAL cultures have been positive for C glabrata, C dubliniensis, Pseudomonas, MRSA.   - Unclear if new respiratory culture will represent colonizing organisms or new infection.   - RVP was negative, discontinued oseltamivir.  - Recommend to D/C vancomycin in setting of no new culture positive for MRSA and no evidence of pneumonia.  - continue cefepime for now pending sensitivities and ID of GNR in respiratory cultures.     Recommendations:   - Continue cefepime   - discontinue vancomycin

## 2018-12-07 NOTE — ASSESSMENT & PLAN NOTE
LFTs noted to be >3 x ULN on admission. Hepatitis panel and liver US unremarkable, but with splenomegaly. Likely CF and medication related given her recent history of headaches and tylenol/fioricet use. Improving since admission, but will continue to trend.  Hepatology consulted and Liver elastography ordered.

## 2018-12-07 NOTE — SUBJECTIVE & OBJECTIVE
Subjective:     Interval History: No acute events overnight. Still with productive cough.  But does report feeling better today.     Continuous Infusions:  Scheduled Meds:   amLODIPine  10 mg Oral Daily    ARIPiprazole  2 mg Oral Daily    calcium-vitamin D3  1 tablet Oral BID WM    ceFEPime (MAXIPIME) IVPB  1 g Intravenous Q8H    cetirizine  5 mg Oral Daily    dapsone  100 mg Oral Daily    diphenhydrAMINE  50 mg Intravenous BID    enoxaparin  40 mg Subcutaneous Daily    fluticasone  1 spray Each Nare Daily    fluticasone-vilanterol  1 puff Inhalation Daily    folic acid  1,000 mcg Oral Daily    gabapentin  300 mg Oral TID    guaiFENesin  600 mg Oral BID    levalbuterol  1.25 mg Nebulization Q6H    lipase-protease-amylase 24,000-76,000-120,000 units  5 capsule Oral TID WM    magnesium oxide  400 mg Oral BID    metoprolol tartrate  25 mg Oral BID    montelukast  10 mg Oral Daily    morphine  15 mg Oral BID    multivit-min-FA-coenzyme Q10 100-5 mcg-mg  1 tablet Oral Daily    mycophenolate  500 mg Oral BID    nystatin  500,000 Units Oral QID    pantoprazole  40 mg Oral Daily    polyethylene glycol  17 g Oral BID    predniSONE  5 mg Oral Daily    tacrolimus  1.5 mg Oral Daily    tacrolimus  2 mg Oral Daily    topiramate  25 mg Oral BID    vancomycin (VANCOCIN) IVPB  750 mg Intravenous Q12H    venlafaxine  150 mg Oral QHS    venlafaxine  37.5 mg Oral QHS     PRN Meds:butalbital-acetaminophen-caffeine -40 mg, diphenhydrAMINE, heparin, porcine (PF), LORazepam, ondansetron, oxyCODONE, promethazine (PHENERGAN) IVPB, tiZANidine    Review of patient's allergies indicates:   Allergen Reactions    Albuterol Palpitations    Colistin Anaphylaxis    Vancomycin analogues      Infusion reaction that does not resolve with slowing    Neupogen [filgrastim] Other (See Comments)     Ostealgia after five daily doses of 300 mcg.      Bactrim [sulfamethoxazole-trimethoprim] Hives    Ceftazidime Hives      Pt stated can tolerate cefapine not ceftazidime    Ceftazidime     Dronabinol Other (See Comments)     Mental changes/hallucinations    Haldol [haloperidol lactate] Other (See Comments)     Seizure like activity    Nsaids (non-steroidal anti-inflammatory drug)      Cannot have due to lung transplant    Adhesive Rash     Cloth tape- please use tegaderm or paper tape    Aztreonam Rash    Ciprofloxacin Nausea And Vomiting     Projectile N/V, per patient.  Unwilling to retry therapy.       Review of Systems   Constitutional: Positive for fatigue. Negative for activity change, appetite change, chills, diaphoresis, fever and unexpected weight change.   HENT: Positive for congestion. Negative for drooling, ear discharge, ear pain, facial swelling, hearing loss, sore throat and trouble swallowing.    Eyes: Negative for discharge and itching.   Respiratory: Positive for cough (intermittently productive), chest tightness and shortness of breath. Negative for apnea, choking, wheezing and stridor.    Cardiovascular: Positive for chest pain (pleuritic). Negative for palpitations and leg swelling.   Gastrointestinal: Negative for abdominal distention, abdominal pain, blood in stool and constipation.   Endocrine: Negative.    Genitourinary: Negative for difficulty urinating and dysuria.   Musculoskeletal: Positive for back pain. Negative for arthralgias, gait problem, joint swelling and myalgias.   Skin: Negative for color change and pallor.   Neurological: Negative for dizziness and headaches.   Hematological: Negative for adenopathy.   Psychiatric/Behavioral: Negative for agitation, behavioral problems and confusion.     Objective:   Physical Exam   Constitutional: She is oriented to person, place, and time. She appears well-developed and well-nourished.   HENT:   Head: Normocephalic and atraumatic.   Mouth/Throat: No oropharyngeal exudate.   Dry mucus membranes   Eyes: EOM are normal. Pupils are equal, round, and  reactive to light. Right eye exhibits no discharge. No scleral icterus.   Neck: Normal range of motion. No thyromegaly present.   Cardiovascular: Normal rate and regular rhythm. Exam reveals no friction rub.   No murmur heard.  Pulmonary/Chest: Effort normal. No stridor. No respiratory distress. She has wheezes (scant, expiratory). She has rhonchi (diffuse). She has no rales.   Abdominal: Soft. She exhibits no distension. There is no tenderness. There is no guarding.   Musculoskeletal: She exhibits no edema or tenderness.   Neurological: She is alert and oriented to person, place, and time.   Skin: Skin is warm and dry.   Psychiatric: She has a normal mood and affect.         Vital Signs (Most Recent):  Temp: 98.3 °F (36.8 °C) (12/07/18 0845)  Pulse: 85 (12/07/18 0914)  Resp: 20 (12/07/18 0914)  BP: 119/82 (12/07/18 0845)  SpO2: 96 % (12/07/18 0914) Vital Signs (24h Range):  Temp:  [98 °F (36.7 °C)-98.8 °F (37.1 °C)] 98.3 °F (36.8 °C)  Pulse:  [74-98] 85  Resp:  [18-20] 20  SpO2:  [93 %-99 %] 96 %  BP: (111-119)/(68-82) 119/82     Weight: 56 kg (123 lb 7.3 oz)  Body mass index is 24.11 kg/m².      Intake/Output Summary (Last 24 hours) at 12/7/2018 1038  Last data filed at 12/7/2018 0600  Gross per 24 hour   Intake 1910 ml   Output 0 ml   Net 1910 ml       Significant Labs:  CBC:  Recent Labs   Lab 12/07/18  0530   WBC 2.74*   RBC 2.39*   HGB 6.7*   HCT 23.0*   PLT 75*   MCV 96   MCH 28.0   MCHC 29.1*     BMP:  Recent Labs   Lab 12/07/18  0530      K 4.3      CO2 26   BUN 21*   CREATININE 1.2   CALCIUM 8.9      Tacrolimus Levels:  Recent Labs   Lab 12/07/18  0530   TACROLIMUS 3.2*     Microbiology:  Microbiology Results (last 7 days)     Procedure Component Value Units Date/Time    Culture, Respiratory with Gram Stain [[id number]] Collected:  12/06/18 0924    Order Status:  Completed Specimen:  Respiratory from Sputum, Expectorated Updated:  12/07/18 1015     Respiratory Culture --     GRAM NEGATIVE  AUBREE  Rare  Identification and susceptibility pending  Normal respiratory anibal also present       Gram Stain (Respiratory) <10 epithelial cells per low power field.     Gram Stain (Respiratory) Many WBC's     Gram Stain (Respiratory) Moderate Gram positive rods     Gram Stain (Respiratory) Moderate Gram negative rods     Gram Stain (Respiratory) Moderate budding yeast    Blood culture [[id number]] Collected:  12/04/18 2151    Order Status:  Completed Specimen:  Blood from Peripheral, Antecubital, Right Updated:  12/06/18 2312     Blood Culture, Routine No Growth to date     Blood Culture, Routine No Growth to date     Blood Culture, Routine No Growth to date    Blood Culture #1 **CANNOT BE ORDERED STAT** [[id number]] Collected:  12/04/18 2120    Order Status:  Completed Specimen:  Blood from Line, Subclavian, Right Updated:  12/06/18 2212     Blood Culture, Routine No Growth to date     Blood Culture, Routine No Growth to date     Blood Culture, Routine No Growth to date    Respiratory Viral Panel by PCR [facility]; Nasal Swab [[id number]] Collected:  12/05/18 0058    Order Status:  Completed Specimen:  Respiratory Updated:  12/06/18 1156     Respiratory Virus Panel, source Nasal Swab     RVP - Adenovirus Not Detected     Comment: Detects Serotypes B and E. Detection of Serotype C may   be limited. If Adenovirus infection is suspected and a   Not Detected result is returned the sample should be   re-tested for Adenovirus using an independent method  (e.g. [employer] Adenovirus Quantitative Real-Time  PCR test.          Enterovirus Not Detected     Comment: Cross-reactivity has been observed between certain Rhinovirus  strains and the Enterovirus assay.          Human Bocavirus Not Detected     Human Coronavirus Not Detected     Comment: The Human Coronavirus assay detects Human coronavirus types  229E, OC43,NL63 and HKU1.          RVP - Human Metapneumovirus (hMPV) Not Detected     RVP - Influenza A Not Detected      Influenza A - P2E5-46 Not Detected     RVP - Influenza B Not Detected     Parainfluenza Not Detected     Respiratory Syncytial VirusVirus (RSV) A Not Detected     Comment: The Respiratory Syncytial Viral assay detects types A and B,  however it does not distinguish between the two.          RVP - Rhinovirus Not Detected     Comment: Cross-Reactivity has been observed between certain   Rhinovirus strains and the Enterovirus assay.  Target Enriched Mulitplex Polymerase Chain Reaction (TEM-PCR)  allows for the detection of multiple pathogens out of a single  reaction.  This test was developed and its performance   characteristics determined by [employer].  It has not   been cleared or approved by the U.S.Food and Drug Administration.  Results should be used in conjunction with clinical findings,   and should not form the sole basis for a diagnosis or treatment  decision.  TEM-PCR is a licensed technology of [employer].         Narrative:       Receiving Lab:->[facility]          Microbiology Results (last 7 days)     Procedure Component Value Units Date/Time    Culture, Respiratory with Gram Stain [[id number]] Collected:  12/06/18 0924    Order Status:  Completed Specimen:  Respiratory from Sputum, Expectorated Updated:  12/07/18 1015     Respiratory Culture --     GRAM NEGATIVE AUBREE  Rare  Identification and susceptibility pending  Normal respiratory anibal also present       Gram Stain (Respiratory) <10 epithelial cells per low power field.     Gram Stain (Respiratory) Many WBC's     Gram Stain (Respiratory) Moderate Gram positive rods     Gram Stain (Respiratory) Moderate Gram negative rods     Gram Stain (Respiratory) Moderate budding yeast    Blood culture [[id number]] Collected:  12/04/18 2151    Order Status:  Completed Specimen:  Blood from Peripheral, Antecubital, Right Updated:  12/06/18 2312     Blood Culture, Routine No Growth to date     Blood Culture, Routine No Growth to date     Blood  Culture, Routine No Growth to date    Blood Culture #1 **CANNOT BE ORDERED STAT** [[id number]] Collected:  12/04/18 2120    Order Status:  Completed Specimen:  Blood from Line, Subclavian, Right Updated:  12/06/18 2212     Blood Culture, Routine No Growth to date     Blood Culture, Routine No Growth to date     Blood Culture, Routine No Growth to date    Respiratory Viral Panel by PCR [facility]; Nasal Swab [[id number]] Collected:  12/05/18 0058    Order Status:  Completed Specimen:  Respiratory Updated:  12/06/18 1156     Respiratory Virus Panel, source Nasal Swab     RVP - Adenovirus Not Detected     Comment: Detects Serotypes B and E. Detection of Serotype C may   be limited. If Adenovirus infection is suspected and a   Not Detected result is returned the sample should be   re-tested for Adenovirus using an independent method  (e.g. [employer] Adenovirus Quantitative Real-Time  PCR test.          Enterovirus Not Detected     Comment: Cross-reactivity has been observed between certain Rhinovirus  strains and the Enterovirus assay.          Human Bocavirus Not Detected     Human Coronavirus Not Detected     Comment: The Human Coronavirus assay detects Human coronavirus types  229E, OC43,NL63 and HKU1.          RVP - Human Metapneumovirus (hMPV) Not Detected     RVP - Influenza A Not Detected     Influenza A - R8T2-36 Not Detected     RVP - Influenza B Not Detected     Parainfluenza Not Detected     Respiratory Syncytial VirusVirus (RSV) A Not Detected     Comment: The Respiratory Syncytial Viral assay detects types A and B,  however it does not distinguish between the two.          RVP - Rhinovirus Not Detected     Comment: Cross-Reactivity has been observed between certain   Rhinovirus strains and the Enterovirus assay.  Target Enriched Mulitplex Polymerase Chain Reaction (TEM-PCR)  allows for the detection of multiple pathogens out of a single  reaction.  This test was developed and its performance    characteristics determined by [employer].  It has not   been cleared or approved by the U.S.Food and Drug Administration.  Results should be used in conjunction with clinical findings,   and should not form the sole basis for a diagnosis or treatment  decision.  TEM-PCR is a licensed technology of [employer].         Narrative:       Receiving Lab:->[facility]        I have reviewed all pertinent labs within the past 24 hours.

## 2018-12-07 NOTE — SUBJECTIVE & OBJECTIVE
Review of Systems   Constitutional: Positive for fatigue. Negative for chills and fever.   Respiratory: Positive for cough and shortness of breath.    Gastrointestinal: Negative for abdominal distention, abdominal pain, diarrhea, nausea and vomiting.     Objective:     Vital Signs (Most Recent):  Temp: 98.7 °F (37.1 °C) (12/07/18 1259)  Pulse: 94 (12/07/18 1259)  Resp: 18 (12/07/18 1259)  BP: 117/70 (12/07/18 1259)  SpO2: (!) 91 % (12/07/18 1259) Vital Signs (24h Range):  Temp:  [98 °F (36.7 °C)-98.7 °F (37.1 °C)] 98.7 °F (37.1 °C)  Pulse:  [] 94  Resp:  [18-20] 18  SpO2:  [91 %-98 %] 91 %  BP: (111-119)/(68-82) 117/70     Weight: 56 kg (123 lb 7.3 oz)  Body mass index is 24.11 kg/m².    Estimated Creatinine Clearance: 54.3 mL/min (based on SCr of 1.2 mg/dL).    Physical Exam   Constitutional: She is oriented to person, place, and time. She appears well-developed and well-nourished.   HENT:   Head: Normocephalic and atraumatic.   Eyes: EOM are normal. Pupils are equal, round, and reactive to light.   Neck: Normal range of motion.   Cardiovascular: Normal rate, regular rhythm and normal heart sounds.   Pulmonary/Chest: Effort normal. She has wheezes.   Abdominal: Soft. Bowel sounds are normal. She exhibits no distension. There is no tenderness.   Musculoskeletal: Normal range of motion. She exhibits no edema.   Neurological: She is alert and oriented to person, place, and time.   Skin: No rash noted.       Significant Labs:   Blood Culture:   Recent Labs   Lab 11/07/18  0200 11/07/18  0252 12/04/18 2120 12/04/18  2151   LABBLOO No growth after 5 days. No growth after 5 days. No Growth to date  No Growth to date  No Growth to date No Growth to date  No Growth to date  No Growth to date     BMP:   Recent Labs   Lab 12/07/18  0530         K 4.3      CO2 26   BUN 21*   CREATININE 1.2   CALCIUM 8.9     CBC:   Recent Labs   Lab 12/06/18  0500 12/07/18  0530   WBC 2.63* 2.74*   HGB 7.4* 6.7*    HCT 25.3* 23.0*   PLT 82* 75*     CMP:   Recent Labs   Lab 12/06/18  0500 12/07/18  0530    140   K 4.1 4.3    106   CO2 25 26    100   BUN 18 21*   CREATININE 1.3 1.2   CALCIUM 8.6* 8.9   PROT 6.6 6.7   ALBUMIN 3.4* 3.4*   BILITOT 0.3 0.3   ALKPHOS 222* 192*   AST 45* 22   ALT 72* 52*   ANIONGAP 8 8   EGFRNONAA 55.6* >60.0     Microbiology Results (last 7 days)     Procedure Component Value Units Date/Time    Culture, Respiratory with Gram Stain [[id number]] Collected:  12/06/18 0924    Order Status:  Completed Specimen:  Respiratory from Sputum, Expectorated Updated:  12/07/18 1015     Respiratory Culture --     GRAM NEGATIVE AUBREE  Rare  Identification and susceptibility pending  Normal respiratory anibal also present       Gram Stain (Respiratory) <10 epithelial cells per low power field.     Gram Stain (Respiratory) Many WBC's     Gram Stain (Respiratory) Moderate Gram positive rods     Gram Stain (Respiratory) Moderate Gram negative rods     Gram Stain (Respiratory) Moderate budding yeast    Blood culture [[id number]] Collected:  12/04/18 2151    Order Status:  Completed Specimen:  Blood from Peripheral, Antecubital, Right Updated:  12/06/18 2312     Blood Culture, Routine No Growth to date     Blood Culture, Routine No Growth to date     Blood Culture, Routine No Growth to date    Blood Culture #1 **CANNOT BE ORDERED STAT** [[id number]] Collected:  12/04/18 2120    Order Status:  Completed Specimen:  Blood from Line, Subclavian, Right Updated:  12/06/18 2212     Blood Culture, Routine No Growth to date     Blood Culture, Routine No Growth to date     Blood Culture, Routine No Growth to date    Respiratory Viral Panel by PCR [facility]; Nasal Swab [[id number]] Collected:  12/05/18 0058    Order Status:  Completed Specimen:  Respiratory Updated:  12/06/18 1156     Respiratory Virus Panel, source Nasal Swab     RVP - Adenovirus Not Detected     Comment: Detects Serotypes B and E. Detection of Serotype  C may   be limited. If Adenovirus infection is suspected and a   Not Detected result is returned the sample should be   re-tested for Adenovirus using an independent method  (e.g. [employer] Adenovirus Quantitative Real-Time  PCR test.          Enterovirus Not Detected     Comment: Cross-reactivity has been observed between certain Rhinovirus  strains and the Enterovirus assay.          Human Bocavirus Not Detected     Human Coronavirus Not Detected     Comment: The Human Coronavirus assay detects Human coronavirus types  229E, OC43,NL63 and HKU1.          RVP - Human Metapneumovirus (hMPV) Not Detected     RVP - Influenza A Not Detected     Influenza A - X1D4-80 Not Detected     RVP - Influenza B Not Detected     Parainfluenza Not Detected     Respiratory Syncytial VirusVirus (RSV) A Not Detected     Comment: The Respiratory Syncytial Viral assay detects types A and B,  however it does not distinguish between the two.          RVP - Rhinovirus Not Detected     Comment: Cross-Reactivity has been observed between certain   Rhinovirus strains and the Enterovirus assay.  Target Enriched Mulitplex Polymerase Chain Reaction (TEM-PCR)  allows for the detection of multiple pathogens out of a single  reaction.  This test was developed and its performance   characteristics determined by [employer].  It has not   been cleared or approved by the U.S.Food and Drug Administration.  Results should be used in conjunction with clinical findings,   and should not form the sole basis for a diagnosis or treatment  decision.  TEM-PCR is a licensed technology of [employer].         Narrative:       Receiving Lab:->[facility]          Significant Imaging: I have reviewed all pertinent imaging results/findings within the past 24 hours.

## 2018-12-07 NOTE — PLAN OF CARE
"Problem: Spiritual Distress, Risk/Actual (Adult,Obstetrics,Pediatric)  Intervention: Facilitate Personal Exploration/Expression of Spirituality  F - Yanni and Belief: Non-denomination Denominational. Pt reflected on "wondering what heaven was like," but "not wanting to go anytime soon." Pt identified sources of hope that keep her "wanting to live," (i.e. Recent marriage).     I - Importance:     C - Community: Pt's mentioned family support: , mom.     A - Address in Care: Introduced and offered pastoral support per patient request. Provided reflective listening, compassionate presence, and spiritual assessment. Pt made aware of 's presence as needed for continued support.           "

## 2018-12-07 NOTE — ASSESSMENT & PLAN NOTE
29 yo F with cystic fibrosis who underwent bilateral lung transplant in 2014 admitted for shortness of breath, cough, and chest congestion being treated with broad spectrum antibiotics. She developed elevated AST/ALT on hospital day 2 which is likely a non-specific elevation in the setting of infection. She has normal TB and INR which suggest her liver is working well. Imaging is not concerning for cirrhosis or liver disease. Isolated splenomegaly can be seen in cystic fibrosis patients. Would recommend against liver biopsy at this time given acute illness, however can obtain US elastography to estimate amount of fibrosis, if any, of the liver.    Recommendations:  - Liver enzymes have trended down, with normal TB and INR  - Obtain US elastography liver  - Daily CBC, CMP, INR

## 2018-12-07 NOTE — PLAN OF CARE
Problem: Patient Care Overview  Goal: Plan of Care Review  - Patient is AAOx4, independent and ambulatory. Patient educated to use call bell for assistance, verbalized understanding.   - Afebrile, WBC 2.74 - continuing IV cefepime, IV vancomycin discontinued   - Patient on room air throughout shift, sats > 91 %   - CR 1.2 - 4 unmeasured occurrences   - Hepatology consulted - Liver US (elastography) completed this afternoon   - Patient on regular diet, eating majority of meals   - Patient complained of pain twice so far this shift, PRN oxycodone admin per order   - Patient complained of a headache once this AM unrelieved with PRN Fioricet - required an additional dose of extra strength tylenol and Fioricet   - Patient complained of nausea relieved with PRN phenergan   - See flow sheet for complete assessment details

## 2018-12-08 NOTE — PROGRESS NOTES
[facility]  Lung Transplant  Progress Note - Floor    Patient Name: Juanita Ibarra  MRN: [id number]  Admission Date: 12/4/2018  Hospital Length of Stay: 4 days  Post-Operative Day: 1640  Attending Physician: Jake Alvarez MD  Primary Care Provider: Jake Alvarez MD     Subjective:     Interval History: No acute events overnight.  Still with chest wall pain from coughing.  Otherwise feels better.     Continuous Infusions:  Scheduled Meds:   amLODIPine  10 mg Oral Daily    ARIPiprazole  2 mg Oral Daily    calcium-vitamin D3  1 tablet Oral BID WM    ceFEPime (MAXIPIME) IVPB  1 g Intravenous Q8H    cetirizine  5 mg Oral Daily    dapsone  100 mg Oral Daily    diphenhydrAMINE  50 mg Intravenous BID    enoxaparin  40 mg Subcutaneous Daily    fluticasone  1 spray Each Nare Daily    fluticasone-vilanterol  1 puff Inhalation Daily    folic acid  1,000 mcg Oral Daily    gabapentin  300 mg Oral TID    guaiFENesin  600 mg Oral BID    levalbuterol  1.25 mg Nebulization Q6H    lipase-protease-amylase 24,000-76,000-120,000 units  5 capsule Oral TID WM    magnesium oxide  400 mg Oral BID    metoprolol tartrate  25 mg Oral BID    montelukast  10 mg Oral Daily    morphine  15 mg Oral BID    multivit-min-FA-coenzyme Q10 100-5 mcg-mg  1 tablet Oral Daily    mycophenolate  500 mg Oral BID    nystatin  500,000 Units Oral QID    pantoprazole  40 mg Oral Daily    polyethylene glycol  17 g Oral BID    predniSONE  5 mg Oral Daily    tacrolimus  1.5 mg Oral Daily    tacrolimus  2 mg Oral Daily    topiramate  25 mg Oral BID    venlafaxine  150 mg Oral QHS    venlafaxine  37.5 mg Oral QHS     PRN Meds:butalbital-acetaminophen-caffeine -40 mg, diphenhydrAMINE, heparin, porcine (PF), LORazepam, ondansetron, oxyCODONE, promethazine (PHENERGAN) IVPB, tiZANidine    Review of patient's allergies indicates:   Allergen Reactions    Albuterol Palpitations    Colistin Anaphylaxis     Vancomycin analogues      Infusion reaction that does not resolve with slowing    Neupogen [filgrastim] Other (See Comments)     Ostealgia after five daily doses of 300 mcg.      Bactrim [sulfamethoxazole-trimethoprim] Hives    Ceftazidime Hives     Pt stated can tolerate cefapine not ceftazidime    Ceftazidime     Dronabinol Other (See Comments)     Mental changes/hallucinations    Haldol [haloperidol lactate] Other (See Comments)     Seizure like activity    Nsaids (non-steroidal anti-inflammatory drug)      Cannot have due to lung transplant    Adhesive Rash     Cloth tape- please use tegaderm or paper tape    Aztreonam Rash    Ciprofloxacin Nausea And Vomiting     Projectile N/V, per patient.  Unwilling to retry therapy.       Review of Systems  Objective:   Physical Exam   Constitutional: She is oriented to person, place, and time. She appears well-developed and well-nourished.   HENT:   Head: Normocephalic and atraumatic.   Mouth/Throat: No oropharyngeal exudate.   Eyes: EOM are normal. Pupils are equal, round, and reactive to light. Right eye exhibits no discharge. No scleral icterus.   Neck: Normal range of motion. No thyromegaly present.   Cardiovascular: Normal rate and regular rhythm. Exam reveals no friction rub.   No murmur heard.  Pulmonary/Chest: Effort normal. No stridor. No respiratory distress. She has wheezes (scant, expiratory). She has no rhonchi. She has no rales.   Abdominal: Soft. She exhibits no distension. There is no tenderness. There is no guarding.   Musculoskeletal: She exhibits no edema or tenderness.   Neurological: She is alert and oriented to person, place, and time.   Skin: Skin is warm and dry.   Psychiatric: She has a normal mood and affect.         Vital Signs (Most Recent):  Temp: 98.5 °F (36.9 °C) (12/08/18 1201)  Pulse: 75 (12/08/18 1201)  Resp: 16 (12/08/18 1201)  BP: 108/70 (12/08/18 1201)  SpO2: (!) 92 % (12/08/18 1201) Vital Signs (24h Range):  Temp:  [97.9  °F (36.6 °C)-98.6 °F (37 °C)] 98.5 °F (36.9 °C)  Pulse:  [75-93] 75  Resp:  [16-20] 16  SpO2:  [92 %-99 %] 92 %  BP: (108-122)/(64-78) 108/70     Weight: 56 kg (123 lb 7.3 oz)  Body mass index is 24.11 kg/m².      Intake/Output Summary (Last 24 hours) at 12/8/2018 1330  Last data filed at 12/8/2018 0637  Gross per 24 hour   Intake 2460 ml   Output --   Net 2460 ml       Significant Labs:  CBC:  Recent Labs   Lab 12/08/18  0951   WBC 3.65*   RBC 3.08*   HGB 8.3*   HCT 28.8*   PLT 90*   MCV 94   MCH 26.9*   MCHC 28.8*     BMP:  Recent Labs   Lab 12/08/18  0951      K 4.3      CO2 22*   BUN 25*   CREATININE 1.1   CALCIUM 8.9      Tacrolimus Levels:  Recent Labs   Lab 12/08/18  0951   TACROLIMUS 2.3*     Microbiology:  Microbiology Results (last 7 days)     Procedure Component Value Units Date/Time    Culture, Respiratory with Gram Stain [[id number]]  (Susceptibility) Collected:  12/06/18 0924    Order Status:  Completed Specimen:  Respiratory from Sputum, Expectorated Updated:  12/08/18 1123     Respiratory Culture --     PSEUDOMONAS AERUGINOSA  Rare  Normal respiratory anibal also present       Gram Stain (Respiratory) <10 epithelial cells per low power field.     Gram Stain (Respiratory) Many WBC's     Gram Stain (Respiratory) Moderate Gram positive rods     Gram Stain (Respiratory) Moderate Gram negative rods     Gram Stain (Respiratory) Moderate budding yeast    Blood culture [[id number]] Collected:  12/04/18 2151    Order Status:  Completed Specimen:  Blood from Peripheral, Antecubital, Right Updated:  12/07/18 2312     Blood Culture, Routine No Growth to date     Blood Culture, Routine No Growth to date     Blood Culture, Routine No Growth to date     Blood Culture, Routine No Growth to date    Blood Culture #1 **CANNOT BE ORDERED STAT** [[id number]] Collected:  12/04/18 2120    Order Status:  Completed Specimen:  Blood from Line, Subclavian, Right Updated:  12/07/18 2212     Blood Culture, Routine No  Growth to date     Blood Culture, Routine No Growth to date     Blood Culture, Routine No Growth to date     Blood Culture, Routine No Growth to date    Respiratory Viral Panel by PCR [facility]; Nasal Swab [[id number]] Collected:  12/05/18 0058    Order Status:  Completed Specimen:  Respiratory Updated:  12/06/18 1156     Respiratory Virus Panel, source Nasal Swab     RVP - Adenovirus Not Detected     Comment: Detects Serotypes B and E. Detection of Serotype C may   be limited. If Adenovirus infection is suspected and a   Not Detected result is returned the sample should be   re-tested for Adenovirus using an independent method  (e.g. [employer] Adenovirus Quantitative Real-Time  PCR test.          Enterovirus Not Detected     Comment: Cross-reactivity has been observed between certain Rhinovirus  strains and the Enterovirus assay.          Human Bocavirus Not Detected     Human Coronavirus Not Detected     Comment: The Human Coronavirus assay detects Human coronavirus types  229E, OC43,NL63 and HKU1.          RVP - Human Metapneumovirus (hMPV) Not Detected     RVP - Influenza A Not Detected     Influenza A - K6L4-26 Not Detected     RVP - Influenza B Not Detected     Parainfluenza Not Detected     Respiratory Syncytial VirusVirus (RSV) A Not Detected     Comment: The Respiratory Syncytial Viral assay detects types A and B,  however it does not distinguish between the two.          RVP - Rhinovirus Not Detected     Comment: Cross-Reactivity has been observed between certain   Rhinovirus strains and the Enterovirus assay.  Target Enriched Mulitplex Polymerase Chain Reaction (TEM-PCR)  allows for the detection of multiple pathogens out of a single  reaction.  This test was developed and its performance   characteristics determined by [employer].  It has not   been cleared or approved by the U.S.Food and Drug Administration.  Results should be used in conjunction with clinical findings,   and should not form  the sole basis for a diagnosis or treatment  decision.  TEM-PCR is a licensed technology of [employer].         Narrative:       Receiving Lab:->[facility]          Microbiology Results (last 7 days)     Procedure Component Value Units Date/Time    Culture, Respiratory with Gram Stain [[id number]]  (Susceptibility) Collected:  12/06/18 0924    Order Status:  Completed Specimen:  Respiratory from Sputum, Expectorated Updated:  12/08/18 1123     Respiratory Culture --     PSEUDOMONAS AERUGINOSA  Rare  Normal respiratory anibal also present       Gram Stain (Respiratory) <10 epithelial cells per low power field.     Gram Stain (Respiratory) Many WBC's     Gram Stain (Respiratory) Moderate Gram positive rods     Gram Stain (Respiratory) Moderate Gram negative rods     Gram Stain (Respiratory) Moderate budding yeast    Blood culture [[id number]] Collected:  12/04/18 2151    Order Status:  Completed Specimen:  Blood from Peripheral, Antecubital, Right Updated:  12/07/18 2312     Blood Culture, Routine No Growth to date     Blood Culture, Routine No Growth to date     Blood Culture, Routine No Growth to date     Blood Culture, Routine No Growth to date    Blood Culture #1 **CANNOT BE ORDERED STAT** [[id number]] Collected:  12/04/18 2120    Order Status:  Completed Specimen:  Blood from Line, Subclavian, Right Updated:  12/07/18 2212     Blood Culture, Routine No Growth to date     Blood Culture, Routine No Growth to date     Blood Culture, Routine No Growth to date     Blood Culture, Routine No Growth to date    Respiratory Viral Panel by PCR [facility]; Nasal Swab [[id number]] Collected:  12/05/18 0058    Order Status:  Completed Specimen:  Respiratory Updated:  12/06/18 1156     Respiratory Virus Panel, source Nasal Swab     RVP - Adenovirus Not Detected     Comment: Detects Serotypes B and E. Detection of Serotype C may   be limited. If Adenovirus infection is suspected and a   Not Detected result is returned the  sample should be   re-tested for Adenovirus using an independent method  (e.g. [employer] Adenovirus Quantitative Real-Time  PCR test.          Enterovirus Not Detected     Comment: Cross-reactivity has been observed between certain Rhinovirus  strains and the Enterovirus assay.          Human Bocavirus Not Detected     Human Coronavirus Not Detected     Comment: The Human Coronavirus assay detects Human coronavirus types  229E, OC43,NL63 and HKU1.          RVP - Human Metapneumovirus (hMPV) Not Detected     RVP - Influenza A Not Detected     Influenza A - H7B9-48 Not Detected     RVP - Influenza B Not Detected     Parainfluenza Not Detected     Respiratory Syncytial VirusVirus (RSV) A Not Detected     Comment: The Respiratory Syncytial Viral assay detects types A and B,  however it does not distinguish between the two.          RVP - Rhinovirus Not Detected     Comment: Cross-Reactivity has been observed between certain   Rhinovirus strains and the Enterovirus assay.  Target Enriched Mulitplex Polymerase Chain Reaction (TEM-PCR)  allows for the detection of multiple pathogens out of a single  reaction.  This test was developed and its performance   characteristics determined by [employer].  It has not   been cleared or approved by the U.S.Food and Drug Administration.  Results should be used in conjunction with clinical findings,   and should not form the sole basis for a diagnosis or treatment  decision.  TEM-PCR is a licensed technology of [employer].         Narrative:       Receiving Lab:->[facility]        I have reviewed all pertinent labs within the past 24 hours.          Assessment/Plan:     * Complication of lung transplant    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 Concerns for progression of her CARLOS EDUARDO versus infectious etiology.  RVP, sputum culture ordered. Procalcitonin negative. CXR without new infiltrates. Troponin and BNP unremarkable. CT chest 12/5 - GGOs relatively unchanged from prior examination. TTE on 12/5 with EF of 55%, mild left atrial enlargement, PAP 26, mild tricuspid regurg, normal LV/RV function.      Continue immunosuppression and ppx. Continue prn oxygen supplementation to maintain goal O2 sats >88%. RVP negative so Tamiflu stopped.  Culture growing Pseudomonas, so will continue Cefepime (patient is allergic to Cipro).  Vancomycin stopped on 12/7.  ID consulted, appreciate recs.  Will give one dose of IV Toradol for costochondritis from coughing.    Hemoglobin back to baseline.  Iron studies unremarkable.     Bronchiolitis obliterans syndrome    Patient with persistent decline on outpatient PFTs. Concerns for progression of CARLOS EDUARDO versus infectious etiology. Will continue oxygen supplementation to maintain O2 sats >88%.  Continues to feel better.  Currently on room air.       Immunosuppression    Continue outpatient regimen of prednisone 5 mg daily,  mg BID, and tacrolimus 2 mg in the AM, and 1.5 in PM. Will monitor daily tacrolimus levels and adjust dose as needed.      Prophylactic antibiotic    Continue dapsone 100 mg daily.      Anxiety disorder    Continue current outpatient regimen with Effexor, Abilify, and ativan.      CF related Pancreatic insufficiency    Continue creon with meals and snacks.      Chronic pain with opiate use    Continue current outpatient pain regimen.      Transaminitis    LFTs noted to be >3 x ULN on admission. Hepatitis panel and liver US unremarkable, but with splenomegaly. Likely CF and medication related. Improving since admission, and now within normal limits.  Hepatology consulted and Liver elastography showed F0-F1 fibrosis.           Reyes Will NP  Lung Transplant  [facility] [address]

## 2018-12-08 NOTE — ASSESSMENT & PLAN NOTE
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 Concerns for progression of her CARLOS EDUARDO versus infectious etiology. RVP, sputum culture ordered. Procalcitonin negative. CXR without new infiltrates. Troponin and BNP unremarkable. CT chest 12/5 - GGOs relatively unchanged from prior examination. TTE on 12/5 with EF of 55%, mild left atrial enlargement, PAP 26, mild tricuspid regurg, normal LV/RV function.      Continue immunosuppression and ppx. Continue prn oxygen supplementation to maintain goal O2 sats >88%. RVP negative so Tamiflu stopped.  Culture growing Pseudomonas, so will continue Cefepime (patient is allergic to Cipro).  Vancomycin stopped on 12/7.  ID consulted, appreciate recs.  Will give one dose of IV Toradol for costochondritis from coughing.    Hemoglobin back to baseline.  Iron studies unremarkable.

## 2018-12-08 NOTE — ASSESSMENT & PLAN NOTE
LFTs noted to be >3 x ULN on admission. Hepatitis panel and liver US unremarkable, but with splenomegaly. Likely CF and medication related. Improving since admission, and now within normal limits.  Hepatology consulted and Liver elastography showed F0-F1 fibrosis.

## 2018-12-08 NOTE — PROGRESS NOTES
[facility]  Infectious Disease  Progress Note    Patient Name: Juanita Ibarra  MRN: [id number]  Admission Date: 12/4/2018  Length of Stay: 4 days  Attending Physician: Jake Alvarez MD  Primary Care Provider: Jake Alvarez MD    Isolation Status: No active isolations  Assessment/Plan:      Bronchiolitis obliterans syndrome    - 27 y/o female PMHx: 6/12/18 CMV D+/R- simulect induction tacro/mmf/pred complicated with rejection A1 8/2014 and CMV reactivation.   - Last admit on 11/7/18 for LISBETH and polypharmacy toxicity.   - Now here with SOB and cough, improved since admission.   - CT chest with no obvious signs of consolidation or effusion, some inflammation of the bronchus intermedius.   - Respiratory culture gram stain remarkable for yeast, GNR and GPR. Now with Pseudomonas pansensitive   - Previous BAL cultures have been positive for C glabrata, C dubliniensis, Pseudomonas, MRSA.   - Unclear if new respiratory culture will represent colonizing organisms or new infection.   - RVP was negative, discontinued oseltamivir.  - consider changing cefepime for PO cipro or levofloxacin to complete 5 day course based on culture results   - would add fluconazole 200mg PO for 5 days to treat oral thrush      Recommendations:   - cefepime consider change to cipro or levofloxacin to complete 5 day course   - fluconazole 200mg PO 5 days for oral thrush    - thank you for your consult will sign off please call with new questions or concerns                    Anticipated Disposition:     Thank you for your consult. I will sign off. Please contact us if you have any additional questions.    José Miguel Juarez MD  Infectious Disease  [facility]    Subjective:     Principal Problem:Complication of lung transplant    HPI: Case of 27 y/o female PMHx: 6/12/18 CMV D+/R- simulect induction tacro/mmf/pred complicated with rejection A1 8/2014 and CMV reactivation. Last admit on  11/7/18 for LISBETH and polypharmacy toxicity. Presents on 12/4/18 with c/c of chest congestion and cough for one week evolution. Patient also mentioned using home oxygen more often. Patient denied recent travel, sick contacts, nausea, emesis or diarrhea. Was started empirically on cefepime, vanc and oseltamivir. ID consulted for antibiotic recommendations.       Review of Systems   Constitutional: Positive for fatigue. Negative for chills and fever.   Respiratory: Positive for cough and shortness of breath.    Gastrointestinal: Negative for abdominal distention, abdominal pain, diarrhea, nausea and vomiting.     Objective:     Vital Signs (Most Recent):  Temp: 98.5 °F (36.9 °C) (12/08/18 1201)  Pulse: 75 (12/08/18 1201)  Resp: 16 (12/08/18 1201)  BP: 108/70 (12/08/18 1201)  SpO2: (!) 92 % (12/08/18 1201) Vital Signs (24h Range):  Temp:  [97.9 °F (36.6 °C)-98.7 °F (37.1 °C)] 98.5 °F (36.9 °C)  Pulse:  [75-94] 75  Resp:  [16-20] 16  SpO2:  [91 %-99 %] 92 %  BP: (108-122)/(64-78) 108/70     Weight: 56 kg (123 lb 7.3 oz)  Body mass index is 24.11 kg/m².    Estimated Creatinine Clearance: 59.2 mL/min (based on SCr of 1.1 mg/dL).    Physical Exam   Constitutional: She is oriented to person, place, and time. She appears well-developed and well-nourished.   HENT:   Head: Normocephalic and atraumatic.   Eyes: EOM are normal. Pupils are equal, round, and reactive to light.   Neck: Normal range of motion.   Cardiovascular: Normal rate, regular rhythm and normal heart sounds.   Pulmonary/Chest: Effort normal. She has wheezes.   Abdominal: Soft. Bowel sounds are normal. She exhibits no distension. There is no tenderness.   Musculoskeletal: Normal range of motion. She exhibits no edema.   Neurological: She is alert and oriented to person, place, and time.   Skin: No rash noted.       Significant Labs:   Blood Culture:   Recent Labs   Lab 11/07/18  0200 11/07/18  0252 12/04/18  2120 12/04/18  2151   NAIN No growth after 5 days. No  growth after 5 days. No Growth to date  No Growth to date  No Growth to date  No Growth to date No Growth to date  No Growth to date  No Growth to date  No Growth to date     BMP:   Recent Labs   Lab 12/08/18  0951   GLU 53*      K 4.3      CO2 22*   BUN 25*   CREATININE 1.1   CALCIUM 8.9     CBC:   Recent Labs   Lab 12/07/18  0530 12/08/18  0951   WBC 2.74* 3.65*   HGB 6.7* 8.3*   HCT 23.0* 28.8*   PLT 75* 90*     CMP:   Recent Labs   Lab 12/07/18  0530 12/08/18  0951    140   K 4.3 4.3    107   CO2 26 22*    53*   BUN 21* 25*   CREATININE 1.2 1.1   CALCIUM 8.9 8.9   PROT 6.7 7.2   ALBUMIN 3.4* 3.6   BILITOT 0.3 0.3   ALKPHOS 192* 189*   AST 22 16   ALT 52* 41   ANIONGAP 8 11   EGFRNONAA >60.0 >60.0     Microbiology Results (last 7 days)     Procedure Component Value Units Date/Time    Culture, Respiratory with Gram Stain [[id number]]  (Susceptibility) Collected:  12/06/18 0924    Order Status:  Completed Specimen:  Respiratory from Sputum, Expectorated Updated:  12/08/18 1123     Respiratory Culture --     PSEUDOMONAS AERUGINOSA  Rare  Normal respiratory anibal also present       Gram Stain (Respiratory) <10 epithelial cells per low power field.     Gram Stain (Respiratory) Many WBC's     Gram Stain (Respiratory) Moderate Gram positive rods     Gram Stain (Respiratory) Moderate Gram negative rods     Gram Stain (Respiratory) Moderate budding yeast    Blood culture [[id number]] Collected:  12/04/18 2151    Order Status:  Completed Specimen:  Blood from Peripheral, Antecubital, Right Updated:  12/07/18 2312     Blood Culture, Routine No Growth to date     Blood Culture, Routine No Growth to date     Blood Culture, Routine No Growth to date     Blood Culture, Routine No Growth to date    Blood Culture #1 **CANNOT BE ORDERED STAT** [[id number]] Collected:  12/04/18 2120    Order Status:  Completed Specimen:  Blood from Line, Subclavian, Right Updated:  12/07/18 2212     Blood Culture,  Routine No Growth to date     Blood Culture, Routine No Growth to date     Blood Culture, Routine No Growth to date     Blood Culture, Routine No Growth to date    Respiratory Viral Panel by PCR [facility]; Nasal Swab [[id number]] Collected:  12/05/18 0058    Order Status:  Completed Specimen:  Respiratory Updated:  12/06/18 1156     Respiratory Virus Panel, source Nasal Swab     RVP - Adenovirus Not Detected     Comment: Detects Serotypes B and E. Detection of Serotype C may   be limited. If Adenovirus infection is suspected and a   Not Detected result is returned the sample should be   re-tested for Adenovirus using an independent method  (e.g. [employer] Adenovirus Quantitative Real-Time  PCR test.          Enterovirus Not Detected     Comment: Cross-reactivity has been observed between certain Rhinovirus  strains and the Enterovirus assay.          Human Bocavirus Not Detected     Human Coronavirus Not Detected     Comment: The Human Coronavirus assay detects Human coronavirus types  229E, OC43,NL63 and HKU1.          RVP - Human Metapneumovirus (hMPV) Not Detected     RVP - Influenza A Not Detected     Influenza A - Z0X8-07 Not Detected     RVP - Influenza B Not Detected     Parainfluenza Not Detected     Respiratory Syncytial VirusVirus (RSV) A Not Detected     Comment: The Respiratory Syncytial Viral assay detects types A and B,  however it does not distinguish between the two.          RVP - Rhinovirus Not Detected     Comment: Cross-Reactivity has been observed between certain   Rhinovirus strains and the Enterovirus assay.  Target Enriched Mulitplex Polymerase Chain Reaction (TEM-PCR)  allows for the detection of multiple pathogens out of a single  reaction.  This test was developed and its performance   characteristics determined by [employer].  It has not   been cleared or approved by the U.S.Food and Drug Administration.  Results should be used in conjunction with clinical findings,   and should  not form the sole basis for a diagnosis or treatment  decision.  TEM-PCR is a licensed technology of [employer].         Narrative:       Receiving Lab:->[facility]          Significant Imaging: I have reviewed all pertinent imaging results/findings within the past 24 hours.

## 2018-12-08 NOTE — ASSESSMENT & PLAN NOTE
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   - Last admit on 11/7/18 for LISBETH and polypharmacy toxicity.   - Now here with SOB and cough, improved since admission.   - CT chest with no obvious signs of consolidation or effusion, some inflammation of the bronchus intermedius.   - Respiratory culture gram stain remarkable for yeast, GNR and GPR. Now with Pseudomonas pansensitive   - Previous BAL cultures have been positive for C glabrata, C dubliniensis, Pseudomonas, MRSA.   - Unclear if new respiratory culture will represent colonizing organisms or new infection.   - RVP was negative, discontinued oseltamivir.  - consider changing cefepime for PO cipro or levofloxacin to complete 5 day course based on culture results   - would add fluconazole 200mg PO for 5 days to treat oral thrush      Recommendations:   - cefepime consider change to cipro or levofloxacin to complete 5 day course   - fluconazole 200mg PO 5 days for oral thrush    - thank you for your consult will sign off please call with new questions or concerns

## 2018-12-08 NOTE — SUBJECTIVE & OBJECTIVE
Subjective:     Interval History: No acute events overnight.  Still with chest wall pain from coughing.  Otherwise feels better.     Continuous Infusions:  Scheduled Meds:   amLODIPine  10 mg Oral Daily    ARIPiprazole  2 mg Oral Daily    calcium-vitamin D3  1 tablet Oral BID WM    ceFEPime (MAXIPIME) IVPB  1 g Intravenous Q8H    cetirizine  5 mg Oral Daily    dapsone  100 mg Oral Daily    diphenhydrAMINE  50 mg Intravenous BID    enoxaparin  40 mg Subcutaneous Daily    fluticasone  1 spray Each Nare Daily    fluticasone-vilanterol  1 puff Inhalation Daily    folic acid  1,000 mcg Oral Daily    gabapentin  300 mg Oral TID    guaiFENesin  600 mg Oral BID    levalbuterol  1.25 mg Nebulization Q6H    lipase-protease-amylase 24,000-76,000-120,000 units  5 capsule Oral TID WM    magnesium oxide  400 mg Oral BID    metoprolol tartrate  25 mg Oral BID    montelukast  10 mg Oral Daily    morphine  15 mg Oral BID    multivit-min-FA-coenzyme Q10 100-5 mcg-mg  1 tablet Oral Daily    mycophenolate  500 mg Oral BID    nystatin  500,000 Units Oral QID    pantoprazole  40 mg Oral Daily    polyethylene glycol  17 g Oral BID    predniSONE  5 mg Oral Daily    tacrolimus  1.5 mg Oral Daily    tacrolimus  2 mg Oral Daily    topiramate  25 mg Oral BID    venlafaxine  150 mg Oral QHS    venlafaxine  37.5 mg Oral QHS     PRN Meds:butalbital-acetaminophen-caffeine -40 mg, diphenhydrAMINE, heparin, porcine (PF), LORazepam, ondansetron, oxyCODONE, promethazine (PHENERGAN) IVPB, tiZANidine    Review of patient's allergies indicates:   Allergen Reactions    Albuterol Palpitations    Colistin Anaphylaxis    Vancomycin analogues      Infusion reaction that does not resolve with slowing    Neupogen [filgrastim] Other (See Comments)     Ostealgia after five daily doses of 300 mcg.      Bactrim [sulfamethoxazole-trimethoprim] Hives    Ceftazidime Hives     Pt stated can tolerate cefapine not ceftazidime     Ceftazidime     Dronabinol Other (See Comments)     Mental changes/hallucinations    Haldol [haloperidol lactate] Other (See Comments)     Seizure like activity    Nsaids (non-steroidal anti-inflammatory drug)      Cannot have due to lung transplant    Adhesive Rash     Cloth tape- please use tegaderm or paper tape    Aztreonam Rash    Ciprofloxacin Nausea And Vomiting     Projectile N/V, per patient.  Unwilling to retry therapy.       Review of Systems  Objective:   Physical Exam   Constitutional: She is oriented to person, place, and time. She appears well-developed and well-nourished.   HENT:   Head: Normocephalic and atraumatic.   Mouth/Throat: No oropharyngeal exudate.   Eyes: EOM are normal. Pupils are equal, round, and reactive to light. Right eye exhibits no discharge. No scleral icterus.   Neck: Normal range of motion. No thyromegaly present.   Cardiovascular: Normal rate and regular rhythm. Exam reveals no friction rub.   No murmur heard.  Pulmonary/Chest: Effort normal. No stridor. No respiratory distress. She has wheezes (scant, expiratory). She has no rhonchi. She has no rales.   Abdominal: Soft. She exhibits no distension. There is no tenderness. There is no guarding.   Musculoskeletal: She exhibits no edema or tenderness.   Neurological: She is alert and oriented to person, place, and time.   Skin: Skin is warm and dry.   Psychiatric: She has a normal mood and affect.         Vital Signs (Most Recent):  Temp: 98.5 °F (36.9 °C) (12/08/18 1201)  Pulse: 75 (12/08/18 1201)  Resp: 16 (12/08/18 1201)  BP: 108/70 (12/08/18 1201)  SpO2: (!) 92 % (12/08/18 1201) Vital Signs (24h Range):  Temp:  [97.9 °F (36.6 °C)-98.6 °F (37 °C)] 98.5 °F (36.9 °C)  Pulse:  [75-93] 75  Resp:  [16-20] 16  SpO2:  [92 %-99 %] 92 %  BP: (108-122)/(64-78) 108/70     Weight: 56 kg (123 lb 7.3 oz)  Body mass index is 24.11 kg/m².      Intake/Output Summary (Last 24 hours) at 12/8/2018 4150  Last data filed at 12/8/2018  0637  Gross per 24 hour   Intake 2460 ml   Output --   Net 2460 ml       Significant Labs:  CBC:  Recent Labs   Lab 12/08/18  0951   WBC 3.65*   RBC 3.08*   HGB 8.3*   HCT 28.8*   PLT 90*   MCV 94   MCH 26.9*   MCHC 28.8*     BMP:  Recent Labs   Lab 12/08/18  0951      K 4.3      CO2 22*   BUN 25*   CREATININE 1.1   CALCIUM 8.9      Tacrolimus Levels:  Recent Labs   Lab 12/08/18  0951   TACROLIMUS 2.3*     Microbiology:  Microbiology Results (last 7 days)     Procedure Component Value Units Date/Time    Culture, Respiratory with Gram Stain [[id number]]  (Susceptibility) Collected:  12/06/18 0924    Order Status:  Completed Specimen:  Respiratory from Sputum, Expectorated Updated:  12/08/18 1123     Respiratory Culture --     PSEUDOMONAS AERUGINOSA  Rare  Normal respiratory anibal also present       Gram Stain (Respiratory) <10 epithelial cells per low power field.     Gram Stain (Respiratory) Many WBC's     Gram Stain (Respiratory) Moderate Gram positive rods     Gram Stain (Respiratory) Moderate Gram negative rods     Gram Stain (Respiratory) Moderate budding yeast    Blood culture [[id number]] Collected:  12/04/18 2151    Order Status:  Completed Specimen:  Blood from Peripheral, Antecubital, Right Updated:  12/07/18 2312     Blood Culture, Routine No Growth to date     Blood Culture, Routine No Growth to date     Blood Culture, Routine No Growth to date     Blood Culture, Routine No Growth to date    Blood Culture #1 **CANNOT BE ORDERED STAT** [[id number]] Collected:  12/04/18 2120    Order Status:  Completed Specimen:  Blood from Line, Subclavian, Right Updated:  12/07/18 2212     Blood Culture, Routine No Growth to date     Blood Culture, Routine No Growth to date     Blood Culture, Routine No Growth to date     Blood Culture, Routine No Growth to date    Respiratory Viral Panel by PCR [facility]; Nasal Swab [[id number]] Collected:  12/05/18 0058    Order Status:  Completed Specimen:  Respiratory Updated:   12/06/18 1156     Respiratory Virus Panel, source Nasal Swab     RVP - Adenovirus Not Detected     Comment: Detects Serotypes B and E. Detection of Serotype C may   be limited. If Adenovirus infection is suspected and a   Not Detected result is returned the sample should be   re-tested for Adenovirus using an independent method  (e.g. [employer] Adenovirus Quantitative Real-Time  PCR test.          Enterovirus Not Detected     Comment: Cross-reactivity has been observed between certain Rhinovirus  strains and the Enterovirus assay.          Human Bocavirus Not Detected     Human Coronavirus Not Detected     Comment: The Human Coronavirus assay detects Human coronavirus types  229E, OC43,NL63 and HKU1.          RVP - Human Metapneumovirus (hMPV) Not Detected     RVP - Influenza A Not Detected     Influenza A - M1R8-42 Not Detected     RVP - Influenza B Not Detected     Parainfluenza Not Detected     Respiratory Syncytial VirusVirus (RSV) A Not Detected     Comment: The Respiratory Syncytial Viral assay detects types A and B,  however it does not distinguish between the two.          RVP - Rhinovirus Not Detected     Comment: Cross-Reactivity has been observed between certain   Rhinovirus strains and the Enterovirus assay.  Target Enriched Mulitplex Polymerase Chain Reaction (TEM-PCR)  allows for the detection of multiple pathogens out of a single  reaction.  This test was developed and its performance   characteristics determined by [employer].  It has not   been cleared or approved by the U.S.Food and Drug Administration.  Results should be used in conjunction with clinical findings,   and should not form the sole basis for a diagnosis or treatment  decision.  TEM-PCR is a licensed technology of [employer].         Narrative:       Receiving Lab:->[facility]          Microbiology Results (last 7 days)     Procedure Component Value Units Date/Time    Culture, Respiratory with Gram Stain  [[id number]]  (Susceptibility) Collected:  12/06/18 0924    Order Status:  Completed Specimen:  Respiratory from Sputum, Expectorated Updated:  12/08/18 1123     Respiratory Culture --     PSEUDOMONAS AERUGINOSA  Rare  Normal respiratory anibal also present       Gram Stain (Respiratory) <10 epithelial cells per low power field.     Gram Stain (Respiratory) Many WBC's     Gram Stain (Respiratory) Moderate Gram positive rods     Gram Stain (Respiratory) Moderate Gram negative rods     Gram Stain (Respiratory) Moderate budding yeast    Blood culture [[id number]] Collected:  12/04/18 2151    Order Status:  Completed Specimen:  Blood from Peripheral, Antecubital, Right Updated:  12/07/18 2312     Blood Culture, Routine No Growth to date     Blood Culture, Routine No Growth to date     Blood Culture, Routine No Growth to date     Blood Culture, Routine No Growth to date    Blood Culture #1 **CANNOT BE ORDERED STAT** [[id number]] Collected:  12/04/18 2120    Order Status:  Completed Specimen:  Blood from Line, Subclavian, Right Updated:  12/07/18 2212     Blood Culture, Routine No Growth to date     Blood Culture, Routine No Growth to date     Blood Culture, Routine No Growth to date     Blood Culture, Routine No Growth to date    Respiratory Viral Panel by PCR [facility]; Nasal Swab [[id number]] Collected:  12/05/18 0058    Order Status:  Completed Specimen:  Respiratory Updated:  12/06/18 1156     Respiratory Virus Panel, source Nasal Swab     RVP - Adenovirus Not Detected     Comment: Detects Serotypes B and E. Detection of Serotype C may   be limited. If Adenovirus infection is suspected and a   Not Detected result is returned the sample should be   re-tested for Adenovirus using an independent method  (e.g. [employer] Adenovirus Quantitative Real-Time  PCR test.          Enterovirus Not Detected     Comment: Cross-reactivity has been observed between certain Rhinovirus  strains and the Enterovirus assay.          Human  Bocavirus Not Detected     Human Coronavirus Not Detected     Comment: The Human Coronavirus assay detects Human coronavirus types  229E, OC43,NL63 and HKU1.          RVP - Human Metapneumovirus (hMPV) Not Detected     RVP - Influenza A Not Detected     Influenza A - F1Q4-42 Not Detected     RVP - Influenza B Not Detected     Parainfluenza Not Detected     Respiratory Syncytial VirusVirus (RSV) A Not Detected     Comment: The Respiratory Syncytial Viral assay detects types A and B,  however it does not distinguish between the two.          RVP - Rhinovirus Not Detected     Comment: Cross-Reactivity has been observed between certain   Rhinovirus strains and the Enterovirus assay.  Target Enriched Mulitplex Polymerase Chain Reaction (TEM-PCR)  allows for the detection of multiple pathogens out of a single  reaction.  This test was developed and its performance   characteristics determined by [facility].  It has not   been cleared or approved by the U.S.Food and Drug Administration.  Results should be used in conjunction with clinical findings,   and should not form the sole basis for a diagnosis or treatment  decision.  TEM-PCR is a licensed technology of [employer].         Narrative:       Receiving Lab:->[facility]        I have reviewed all pertinent labs within the past 24 hours.

## 2018-12-08 NOTE — SUBJECTIVE & OBJECTIVE
Review of Systems   Constitutional: Positive for fatigue. Negative for chills and fever.   Respiratory: Positive for cough and shortness of breath.    Gastrointestinal: Negative for abdominal distention, abdominal pain, diarrhea, nausea and vomiting.     Objective:     Vital Signs (Most Recent):  Temp: 98.5 °F (36.9 °C) (12/08/18 1201)  Pulse: 75 (12/08/18 1201)  Resp: 16 (12/08/18 1201)  BP: 108/70 (12/08/18 1201)  SpO2: (!) 92 % (12/08/18 1201) Vital Signs (24h Range):  Temp:  [97.9 °F (36.6 °C)-98.7 °F (37.1 °C)] 98.5 °F (36.9 °C)  Pulse:  [75-94] 75  Resp:  [16-20] 16  SpO2:  [91 %-99 %] 92 %  BP: (108-122)/(64-78) 108/70     Weight: 56 kg (123 lb 7.3 oz)  Body mass index is 24.11 kg/m².    Estimated Creatinine Clearance: 59.2 mL/min (based on SCr of 1.1 mg/dL).    Physical Exam   Constitutional: She is oriented to person, place, and time. She appears well-developed and well-nourished.   HENT:   Head: Normocephalic and atraumatic.   Eyes: EOM are normal. Pupils are equal, round, and reactive to light.   Neck: Normal range of motion.   Cardiovascular: Normal rate, regular rhythm and normal heart sounds.   Pulmonary/Chest: Effort normal. She has wheezes.   Abdominal: Soft. Bowel sounds are normal. She exhibits no distension. There is no tenderness.   Musculoskeletal: Normal range of motion. She exhibits no edema.   Neurological: She is alert and oriented to person, place, and time.   Skin: No rash noted.       Significant Labs:   Blood Culture:   Recent Labs   Lab 11/07/18  0200 11/07/18  0252 12/04/18 2120 12/04/18  2151   LABBLOO No growth after 5 days. No growth after 5 days. No Growth to date  No Growth to date  No Growth to date  No Growth to date No Growth to date  No Growth to date  No Growth to date  No Growth to date     BMP:   Recent Labs   Lab 12/08/18  0951   GLU 53*      K 4.3      CO2 22*   BUN 25*   CREATININE 1.1   CALCIUM 8.9     CBC:   Recent Labs   Lab 12/07/18  0548  12/08/18  0951   WBC 2.74* 3.65*   HGB 6.7* 8.3*   HCT 23.0* 28.8*   PLT 75* 90*     CMP:   Recent Labs   Lab 12/07/18  0530 12/08/18  0951    140   K 4.3 4.3    107   CO2 26 22*    53*   BUN 21* 25*   CREATININE 1.2 1.1   CALCIUM 8.9 8.9   PROT 6.7 7.2   ALBUMIN 3.4* 3.6   BILITOT 0.3 0.3   ALKPHOS 192* 189*   AST 22 16   ALT 52* 41   ANIONGAP 8 11   EGFRNONAA >60.0 >60.0     Microbiology Results (last 7 days)     Procedure Component Value Units Date/Time    Culture, Respiratory with Gram Stain [[id number]]  (Susceptibility) Collected:  12/06/18 0924    Order Status:  Completed Specimen:  Respiratory from Sputum, Expectorated Updated:  12/08/18 1123     Respiratory Culture --     PSEUDOMONAS AERUGINOSA  Rare  Normal respiratory anibal also present       Gram Stain (Respiratory) <10 epithelial cells per low power field.     Gram Stain (Respiratory) Many WBC's     Gram Stain (Respiratory) Moderate Gram positive rods     Gram Stain (Respiratory) Moderate Gram negative rods     Gram Stain (Respiratory) Moderate budding yeast    Blood culture [[id number]] Collected:  12/04/18 2151    Order Status:  Completed Specimen:  Blood from Peripheral, Antecubital, Right Updated:  12/07/18 2312     Blood Culture, Routine No Growth to date     Blood Culture, Routine No Growth to date     Blood Culture, Routine No Growth to date     Blood Culture, Routine No Growth to date    Blood Culture #1 **CANNOT BE ORDERED STAT** [[id number]] Collected:  12/04/18 2120    Order Status:  Completed Specimen:  Blood from Line, Subclavian, Right Updated:  12/07/18 2212     Blood Culture, Routine No Growth to date     Blood Culture, Routine No Growth to date     Blood Culture, Routine No Growth to date     Blood Culture, Routine No Growth to date    Respiratory Viral Panel by PCR [facility]; Nasal Swab [[id number]] Collected:  12/05/18 0058    Order Status:  Completed Specimen:  Respiratory Updated:  12/06/18 1156     Respiratory Virus  Panel, source Nasal Swab     RVP - Adenovirus Not Detected     Comment: Detects Serotypes B and E. Detection of Serotype C may   be limited. If Adenovirus infection is suspected and a   Not Detected result is returned the sample should be   re-tested for Adenovirus using an independent method  (e.g. [employer] Adenovirus Quantitative Real-Time  PCR test.          Enterovirus Not Detected     Comment: Cross-reactivity has been observed between certain Rhinovirus  strains and the Enterovirus assay.          Human Bocavirus Not Detected     Human Coronavirus Not Detected     Comment: The Human Coronavirus assay detects Human coronavirus types  229E, OC43,NL63 and HKU1.          RVP - Human Metapneumovirus (hMPV) Not Detected     RVP - Influenza A Not Detected     Influenza A - [id number] Not Detected     RVP - Influenza B Not Detected     Parainfluenza Not Detected     Respiratory Syncytial VirusVirus (RSV) A Not Detected     Comment: The Respiratory Syncytial Viral assay detects types A and B,  however it does not distinguish between the two.          RVP - Rhinovirus Not Detected     Comment: Cross-Reactivity has been observed between certain   Rhinovirus strains and the Enterovirus assay.  Target Enriched Mulitplex Polymerase Chain Reaction (TEM-PCR)  allows for the detection of multiple pathogens out of a single  reaction.  This test was developed and its performance   characteristics determined by [employer].  It has not   been cleared or approved by the U.S.Food and Drug Administration.  Results should be used in conjunction with clinical findings,   and should not form the sole basis for a diagnosis or treatment  decision.  TEM-PCR is a licensed technology of [employer].         Narrative:       Receiving Lab:->[facility]          Significant Imaging: I have reviewed all pertinent imaging results/findings within the past 24 hours.

## 2018-12-08 NOTE — PLAN OF CARE
Pt is AAOx4; afebrile; vital signs stable. Fioricet and tylenol given for headache. Oxycodone and toradol given for pain. She is up independently.  at bedside, attentive to pt.

## 2018-12-08 NOTE — ASSESSMENT & PLAN NOTE
Patient with persistent decline on outpatient PFTs. Concerns for progression of CARLOS EDUARDO versus infectious etiology. Will continue oxygen supplementation to maintain O2 sats >88%.  Continues to feel better.  Currently on room air.

## 2018-12-09 NOTE — PROGRESS NOTES
[facility]  Lung Transplant  Progress Note - Floor    Patient Name: Juanita Ibarra  MRN: [id number]  Admission Date: 12/4/2018  Hospital Length of Stay: 5 days  Post-Operative Day: 1641  Attending Physician: Jake Alvarez MD  Primary Care Provider: Jake Alvarez MD     Subjective:     Interval History: No acute events overnight.  Still with cough, but feeling better overall    Continuous Infusions:  Scheduled Meds:   amLODIPine  10 mg Oral Daily    ARIPiprazole  2 mg Oral Daily    calcium-vitamin D3  1 tablet Oral BID WM    ceFEPime (MAXIPIME) IVPB  1 g Intravenous Q8H    cetirizine  5 mg Oral Daily    dapsone  100 mg Oral Daily    diphenhydrAMINE  50 mg Intravenous BID    enoxaparin  40 mg Subcutaneous Daily    fluconazole  200 mg Oral Daily    fluticasone  1 spray Each Nare Daily    fluticasone-vilanterol  1 puff Inhalation Daily    folic acid  1,000 mcg Oral Daily    gabapentin  300 mg Oral TID    guaiFENesin  600 mg Oral BID    ipratropium  0.5 mg Nebulization Q6H    levalbuterol  1.25 mg Nebulization Q6H    lipase-protease-amylase 24,000-76,000-120,000 units  5 capsule Oral TID WM    magnesium oxide  400 mg Oral BID    metoprolol tartrate  25 mg Oral BID    montelukast  10 mg Oral Daily    morphine  15 mg Oral BID    multivit-min-FA-coenzyme Q10 100-5 mcg-mg  1 tablet Oral Daily    mycophenolate  500 mg Oral BID    pantoprazole  40 mg Oral Daily    polyethylene glycol  17 g Oral BID    predniSONE  5 mg Oral Daily    tacrolimus  2.5 mg Oral BID    topiramate  25 mg Oral BID    venlafaxine  150 mg Oral QHS    venlafaxine  37.5 mg Oral QHS     PRN Meds:acetaminophen, butalbital-acetaminophen-caffeine -40 mg, diphenhydrAMINE, heparin, porcine (PF), LORazepam, ondansetron, oxyCODONE, promethazine (PHENERGAN) IVPB, tiZANidine    Review of patient's allergies indicates:   Allergen Reactions    Albuterol Palpitations    Colistin Anaphylaxis     Vancomycin analogues      Infusion reaction that does not resolve with slowing    Neupogen [filgrastim] Other (See Comments)     Ostealgia after five daily doses of 300 mcg.      Bactrim [sulfamethoxazole-trimethoprim] Hives    Ceftazidime Hives     Pt stated can tolerate cefapine not ceftazidime    Ceftazidime     Dronabinol Other (See Comments)     Mental changes/hallucinations    Haldol [haloperidol lactate] Other (See Comments)     Seizure like activity    Nsaids (non-steroidal anti-inflammatory drug)      Cannot have due to lung transplant    Adhesive Rash     Cloth tape- please use tegaderm or paper tape    Aztreonam Rash    Ciprofloxacin Nausea And Vomiting     Projectile N/V, per patient.  Unwilling to retry therapy.       Review of Systems   Constitutional: Negative for activity change, appetite change, chills, diaphoresis, fatigue, fever and unexpected weight change.   HENT: Negative for congestion, drooling, ear discharge, ear pain, facial swelling, hearing loss, sore throat and trouble swallowing.    Eyes: Negative for discharge and itching.   Respiratory: Positive for cough (intermittently productive). Negative for apnea, choking, chest tightness, shortness of breath, wheezing and stridor.    Cardiovascular: Positive for chest pain (pleuritic). Negative for palpitations and leg swelling.   Gastrointestinal: Negative for abdominal distention, abdominal pain, blood in stool and constipation.   Endocrine: Negative.    Genitourinary: Negative for difficulty urinating and dysuria.   Musculoskeletal: Negative for arthralgias, back pain, gait problem, joint swelling and myalgias.   Skin: Negative for color change and pallor.   Neurological: Negative for dizziness and headaches.   Hematological: Negative for adenopathy.   Psychiatric/Behavioral: Negative for agitation, behavioral problems and confusion.     Objective:   Physical Exam   Constitutional: She is oriented to person, place, and time.  She appears well-developed and well-nourished.   HENT:   Head: Normocephalic and atraumatic.   Mouth/Throat: No oropharyngeal exudate.   Eyes: EOM are normal. Pupils are equal, round, and reactive to light. Right eye exhibits no discharge. No scleral icterus.   Neck: Normal range of motion. No thyromegaly present.   Cardiovascular: Normal rate and regular rhythm. Exam reveals no friction rub.   No murmur heard.  Pulmonary/Chest: Effort normal. No stridor. No respiratory distress. She has wheezes (scant, expiratory). She has no rhonchi. She has no rales.   Abdominal: Soft. She exhibits no distension. There is no tenderness. There is no guarding.   Musculoskeletal: She exhibits no edema or tenderness.   Neurological: She is alert and oriented to person, place, and time.   Skin: Skin is warm and dry.   Psychiatric: She has a normal mood and affect.         Vital Signs (Most Recent):  Temp: 98 °F (36.7 °C) (12/09/18 0745)  Pulse: 78 (12/09/18 0942)  Resp: 18 (12/09/18 0942)  BP: 118/70 (12/09/18 0745)  SpO2: 98 % (12/09/18 0823) Vital Signs (24h Range):  Temp:  [98 °F (36.7 °C)-98.7 °F (37.1 °C)] 98 °F (36.7 °C)  Pulse:  [76-94] 78  Resp:  [16-20] 18  SpO2:  [94 %-98 %] 98 %  BP: (109-118)/(57-70) 118/70     Weight: 56 kg (123 lb 7.3 oz)  Body mass index is 24.11 kg/m².      Intake/Output Summary (Last 24 hours) at 12/9/2018 1208  Last data filed at 12/9/2018 0541  Gross per 24 hour   Intake 1260 ml   Output --   Net 1260 ml       Significant Labs:  CBC:  Recent Labs   Lab 12/09/18  0600   WBC 3.88*   RBC 2.77*   HGB 7.5*   HCT 26.3*   PLT 90*   MCV 95   MCH 27.1   MCHC 28.5*     BMP:  Recent Labs   Lab 12/09/18  0600      K 4.8      CO2 24   BUN 29*   CREATININE 1.1   CALCIUM 8.8      Tacrolimus Levels:  Recent Labs   Lab 12/09/18  0600   TACROLIMUS 4.0*     Microbiology:  Microbiology Results (last 7 days)     Procedure Component Value Units Date/Time    Blood culture [[id number]] Collected:  12/04/18 5046     Order Status:  Completed Specimen:  Blood from Peripheral, Antecubital, Right Updated:  12/08/18 2312     Blood Culture, Routine No Growth to date     Blood Culture, Routine No Growth to date     Blood Culture, Routine No Growth to date     Blood Culture, Routine No Growth to date     Blood Culture, Routine No Growth to date    Blood Culture #1 **CANNOT BE ORDERED STAT** [[id number]] Collected:  12/04/18 2120    Order Status:  Completed Specimen:  Blood from Line, Subclavian, Right Updated:  12/08/18 2212     Blood Culture, Routine No Growth to date     Blood Culture, Routine No Growth to date     Blood Culture, Routine No Growth to date     Blood Culture, Routine No Growth to date     Blood Culture, Routine No Growth to date    Culture, Respiratory with Gram Stain [[id number]]  (Susceptibility) Collected:  12/06/18 0924    Order Status:  Completed Specimen:  Respiratory from Sputum, Expectorated Updated:  12/08/18 1123     Respiratory Culture --     PSEUDOMONAS AERUGINOSA  Rare  Normal respiratory anibal also present       Gram Stain (Respiratory) <10 epithelial cells per low power field.     Gram Stain (Respiratory) Many WBC's     Gram Stain (Respiratory) Moderate Gram positive rods     Gram Stain (Respiratory) Moderate Gram negative rods     Gram Stain (Respiratory) Moderate budding yeast    Respiratory Viral Panel by PCR [facility]; Nasal Swab [[id number]] Collected:  12/05/18 0058    Order Status:  Completed Specimen:  Respiratory Updated:  12/06/18 1156     Respiratory Virus Panel, source Nasal Swab     RVP - Adenovirus Not Detected     Comment: Detects Serotypes B and E. Detection of Serotype C may   be limited. If Adenovirus infection is suspected and a   Not Detected result is returned the sample should be   re-tested for Adenovirus using an independent method  (e.g. [employer] Adenovirus Quantitative Real-Time  PCR test.          Enterovirus Not Detected     Comment: Cross-reactivity has been observed  between certain Rhinovirus  strains and the Enterovirus assay.          Human Bocavirus Not Detected     Human Coronavirus Not Detected     Comment: The Human Coronavirus assay detects Human coronavirus types  229E, OC43,NL63 and HKU1.          RVP - Human Metapneumovirus (hMPV) Not Detected     RVP - Influenza A Not Detected     Influenza A - D5R2-45 Not Detected     RVP - Influenza B Not Detected     Parainfluenza Not Detected     Respiratory Syncytial VirusVirus (RSV) A Not Detected     Comment: The Respiratory Syncytial Viral assay detects types A and B,  however it does not distinguish between the two.          RVP - Rhinovirus Not Detected     Comment: Cross-Reactivity has been observed between certain   Rhinovirus strains and the Enterovirus assay.  Target Enriched Mulitplex Polymerase Chain Reaction (TEM-PCR)  allows for the detection of multiple pathogens out of a single  reaction.  This test was developed and its performance   characteristics determined by [employer].  It has not   been cleared or approved by the U.S.Food and Drug Administration.  Results should be used in conjunction with clinical findings,   and should not form the sole basis for a diagnosis or treatment  decision.  TEM-PCR is a licensed technology of [employer].         Narrative:       Receiving Lab:->[facility]          Microbiology Results (last 7 days)     Procedure Component Value Units Date/Time    Blood culture [[id number]] Collected:  12/04/18 2151    Order Status:  Completed Specimen:  Blood from Peripheral, Antecubital, Right Updated:  12/08/18 2312     Blood Culture, Routine No Growth to date     Blood Culture, Routine No Growth to date     Blood Culture, Routine No Growth to date     Blood Culture, Routine No Growth to date     Blood Culture, Routine No Growth to date    Blood Culture #1 **CANNOT BE ORDERED STAT** [[id number]] Collected:  12/04/18 2120    Order Status:  Completed Specimen:  Blood from Line,  Subclavian, Right Updated:  12/08/18 2212     Blood Culture, Routine No Growth to date     Blood Culture, Routine No Growth to date     Blood Culture, Routine No Growth to date     Blood Culture, Routine No Growth to date     Blood Culture, Routine No Growth to date    Culture, Respiratory with Gram Stain [[id number]]  (Susceptibility) Collected:  12/06/18 0924    Order Status:  Completed Specimen:  Respiratory from Sputum, Expectorated Updated:  12/08/18 1123     Respiratory Culture --     PSEUDOMONAS AERUGINOSA  Rare  Normal respiratory anibal also present       Gram Stain (Respiratory) <10 epithelial cells per low power field.     Gram Stain (Respiratory) Many WBC's     Gram Stain (Respiratory) Moderate Gram positive rods     Gram Stain (Respiratory) Moderate Gram negative rods     Gram Stain (Respiratory) Moderate budding yeast    Respiratory Viral Panel by PCR [facility]; Nasal Swab [[id number]] Collected:  12/05/18 0058    Order Status:  Completed Specimen:  Respiratory Updated:  12/06/18 1156     Respiratory Virus Panel, source Nasal Swab     RVP - Adenovirus Not Detected     Comment: Detects Serotypes B and E. Detection of Serotype C may   be limited. If Adenovirus infection is suspected and a   Not Detected result is returned the sample should be   re-tested for Adenovirus using an independent method  (e.g. [employer] Adenovirus Quantitative Real-Time  PCR test.          Enterovirus Not Detected     Comment: Cross-reactivity has been observed between certain Rhinovirus  strains and the Enterovirus assay.          Human Bocavirus Not Detected     Human Coronavirus Not Detected     Comment: The Human Coronavirus assay detects Human coronavirus types  229E, OC43,NL63 and HKU1.          RVP - Human Metapneumovirus (hMPV) Not Detected     RVP - Influenza A Not Detected     Influenza A - J4T7-24 Not Detected     RVP - Influenza B Not Detected     Parainfluenza Not Detected     Respiratory Syncytial VirusVirus  (RSV) A Not Detected     Comment: The Respiratory Syncytial Viral assay detects types A and B,  however it does not distinguish between the two.          RVP - Rhinovirus Not Detected     Comment: Cross-Reactivity has been observed between certain   Rhinovirus strains and the Enterovirus assay.  Target Enriched Mulitplex Polymerase Chain Reaction (TEM-PCR)  allows for the detection of multiple pathogens out of a single  reaction.  This test was developed and its performance   characteristics determined by [employer].  It has not   been cleared or approved by the U.S.Food and Drug Administration.  Results should be used in conjunction with clinical findings,   and should not form the sole basis for a diagnosis or treatment  decision.  TEM-PCR is a licensed technology of [employer].         Narrative:       Receiving Lab:->[facility]        I have reviewed all pertinent labs within the past 24 hours.        Assessment/Plan:     * Complication of lung transplant    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 Concerns for progression of her CARLOS EDUARDO versus infectious etiology. RVP, sputum culture ordered. Procalcitonin negative. CXR without new infiltrates. Troponin and BNP unremarkable. CT chest 12/5 - GGOs relatively unchanged from prior examination. TTE on 12/5 with EF of 55%, mild left atrial enlargement, PAP 26, mild tricuspid regurg, normal LV/RV function.      Continue immunosuppression and ppx. Continue prn oxygen supplementation to maintain goal O2 sats >88%. RVP negative so Tamiflu stopped.  Culture growing Pseudomonas, so will continue Cefepime (patient is allergic to Cipro).  Vancomycin stopped on 12/7.  ID consulted, appreciate recs.  Will perform bronchoscopy for cleanout. Patient requires bronchoscopy to be done in the OR, so will place request.   Will add atrovent.          Bronchiolitis obliterans syndrome    Patient with persistent decline on outpatient PFTs. Concerns for progression of CARLOS EDUARDO versus infectious etiology. Will continue oxygen supplementation to maintain O2 sats >88%.  Continues to feel better.  Currently on room air.  Will perform bronchoscopy for cleanout. Patient requires bronchoscopy to be done in the OR, so will place request.        Immunosuppression    Continue prednisone 5 mg daily,  mg BID, and tacrolimus 2.5 mg BID.  Will monitor daily tacrolimus levels and adjust dose as needed.      Prophylactic antibiotic    Continue dapsone 100 mg daily.      Anxiety disorder    Continue current outpatient regimen with Effexor, Abilify, and ativan.      CF related Pancreatic insufficiency    Continue creon with meals and snacks.      Chronic pain with opiate use    Continue current outpatient pain regimen.          Reyes Will NP  Lung Transplant  [facility]

## 2018-12-09 NOTE — PLAN OF CARE
Pt is AAOx4; afebrile; vital signs stable on room air. Port is hep locked. Fioricet and tylenol given for headache. Oxy given for chest pain. She is up independently. Pt concerned about mucinex increasing secretions, would like to refuse PM dose.

## 2018-12-09 NOTE — SUBJECTIVE & OBJECTIVE
Subjective:     Interval History: No acute events overnight.  Still with cough, but feeling better overall    Continuous Infusions:  Scheduled Meds:   amLODIPine  10 mg Oral Daily    ARIPiprazole  2 mg Oral Daily    calcium-vitamin D3  1 tablet Oral BID WM    ceFEPime (MAXIPIME) IVPB  1 g Intravenous Q8H    cetirizine  5 mg Oral Daily    dapsone  100 mg Oral Daily    diphenhydrAMINE  50 mg Intravenous BID    enoxaparin  40 mg Subcutaneous Daily    fluconazole  200 mg Oral Daily    fluticasone  1 spray Each Nare Daily    fluticasone-vilanterol  1 puff Inhalation Daily    folic acid  1,000 mcg Oral Daily    gabapentin  300 mg Oral TID    guaiFENesin  600 mg Oral BID    ipratropium  0.5 mg Nebulization Q6H    levalbuterol  1.25 mg Nebulization Q6H    lipase-protease-amylase 24,000-76,000-120,000 units  5 capsule Oral TID WM    magnesium oxide  400 mg Oral BID    metoprolol tartrate  25 mg Oral BID    montelukast  10 mg Oral Daily    morphine  15 mg Oral BID    multivit-min-FA-coenzyme Q10 100-5 mcg-mg  1 tablet Oral Daily    mycophenolate  500 mg Oral BID    pantoprazole  40 mg Oral Daily    polyethylene glycol  17 g Oral BID    predniSONE  5 mg Oral Daily    tacrolimus  2.5 mg Oral BID    topiramate  25 mg Oral BID    venlafaxine  150 mg Oral QHS    venlafaxine  37.5 mg Oral QHS     PRN Meds:acetaminophen, butalbital-acetaminophen-caffeine -40 mg, diphenhydrAMINE, heparin, porcine (PF), LORazepam, ondansetron, oxyCODONE, promethazine (PHENERGAN) IVPB, tiZANidine    Review of patient's allergies indicates:   Allergen Reactions    Albuterol Palpitations    Colistin Anaphylaxis    Vancomycin analogues      Infusion reaction that does not resolve with slowing    Neupogen [filgrastim] Other (See Comments)     Ostealgia after five daily doses of 300 mcg.      Bactrim [sulfamethoxazole-trimethoprim] Hives    Ceftazidime Hives     Pt stated can tolerate cefapine not ceftazidime     Ceftazidime     Dronabinol Other (See Comments)     Mental changes/hallucinations    Haldol [haloperidol lactate] Other (See Comments)     Seizure like activity    Nsaids (non-steroidal anti-inflammatory drug)      Cannot have due to lung transplant    Adhesive Rash     Cloth tape- please use tegaderm or paper tape    Aztreonam Rash    Ciprofloxacin Nausea And Vomiting     Projectile N/V, per patient.  Unwilling to retry therapy.       Review of Systems   Constitutional: Negative for activity change, appetite change, chills, diaphoresis, fatigue, fever and unexpected weight change.   HENT: Negative for congestion, drooling, ear discharge, ear pain, facial swelling, hearing loss, sore throat and trouble swallowing.    Eyes: Negative for discharge and itching.   Respiratory: Positive for cough (intermittently productive). Negative for apnea, choking, chest tightness, shortness of breath, wheezing and stridor.    Cardiovascular: Positive for chest pain (pleuritic). Negative for palpitations and leg swelling.   Gastrointestinal: Negative for abdominal distention, abdominal pain, blood in stool and constipation.   Endocrine: Negative.    Genitourinary: Negative for difficulty urinating and dysuria.   Musculoskeletal: Negative for arthralgias, back pain, gait problem, joint swelling and myalgias.   Skin: Negative for color change and pallor.   Neurological: Negative for dizziness and headaches.   Hematological: Negative for adenopathy.   Psychiatric/Behavioral: Negative for agitation, behavioral problems and confusion.     Objective:   Physical Exam   Constitutional: She is oriented to person, place, and time. She appears well-developed and well-nourished.   HENT:   Head: Normocephalic and atraumatic.   Mouth/Throat: No oropharyngeal exudate.   Eyes: EOM are normal. Pupils are equal, round, and reactive to light. Right eye exhibits no discharge. No scleral icterus.   Neck: Normal range of motion. No thyromegaly  present.   Cardiovascular: Normal rate and regular rhythm. Exam reveals no friction rub.   No murmur heard.  Pulmonary/Chest: Effort normal. No stridor. No respiratory distress. She has wheezes (scant, expiratory). She has no rhonchi. She has no rales.   Abdominal: Soft. She exhibits no distension. There is no tenderness. There is no guarding.   Musculoskeletal: She exhibits no edema or tenderness.   Neurological: She is alert and oriented to person, place, and time.   Skin: Skin is warm and dry.   Psychiatric: She has a normal mood and affect.         Vital Signs (Most Recent):  Temp: 98 °F (36.7 °C) (12/09/18 0745)  Pulse: 78 (12/09/18 0942)  Resp: 18 (12/09/18 0942)  BP: 118/70 (12/09/18 0745)  SpO2: 98 % (12/09/18 0823) Vital Signs (24h Range):  Temp:  [98 °F (36.7 °C)-98.7 °F (37.1 °C)] 98 °F (36.7 °C)  Pulse:  [76-94] 78  Resp:  [16-20] 18  SpO2:  [94 %-98 %] 98 %  BP: (109-118)/(57-70) 118/70     Weight: 56 kg (123 lb 7.3 oz)  Body mass index is 24.11 kg/m².      Intake/Output Summary (Last 24 hours) at 12/9/2018 1208  Last data filed at 12/9/2018 0541  Gross per 24 hour   Intake 1260 ml   Output --   Net 1260 ml       Significant Labs:  CBC:  Recent Labs   Lab 12/09/18  0600   WBC 3.88*   RBC 2.77*   HGB 7.5*   HCT 26.3*   PLT 90*   MCV 95   MCH 27.1   MCHC 28.5*     BMP:  Recent Labs   Lab 12/09/18  0600      K 4.8      CO2 24   BUN 29*   CREATININE 1.1   CALCIUM 8.8      Tacrolimus Levels:  Recent Labs   Lab 12/09/18  0600   TACROLIMUS 4.0*     Microbiology:  Microbiology Results (last 7 days)     Procedure Component Value Units Date/Time    Blood culture [[id number]] Collected:  12/04/18 5181    Order Status:  Completed Specimen:  Blood from Peripheral, Antecubital, Right Updated:  12/08/18 1574     Blood Culture, Routine No Growth to date     Blood Culture, Routine No Growth to date     Blood Culture, Routine No Growth to date     Blood Culture, Routine No Growth to date     Blood Culture,  Routine No Growth to date    Blood Culture #1 **CANNOT BE ORDERED STAT** [[id number]] Collected:  12/04/18 2120    Order Status:  Completed Specimen:  Blood from Line, Subclavian, Right Updated:  12/08/18 2212     Blood Culture, Routine No Growth to date     Blood Culture, Routine No Growth to date     Blood Culture, Routine No Growth to date     Blood Culture, Routine No Growth to date     Blood Culture, Routine No Growth to date    Culture, Respiratory with Gram Stain [[id number]]  (Susceptibility) Collected:  12/06/18 0924    Order Status:  Completed Specimen:  Respiratory from Sputum, Expectorated Updated:  12/08/18 1123     Respiratory Culture --     PSEUDOMONAS AERUGINOSA  Rare  Normal respiratory anibal also present       Gram Stain (Respiratory) <10 epithelial cells per low power field.     Gram Stain (Respiratory) Many WBC's     Gram Stain (Respiratory) Moderate Gram positive rods     Gram Stain (Respiratory) Moderate Gram negative rods     Gram Stain (Respiratory) Moderate budding yeast    Respiratory Viral Panel by PCR [facility]; Nasal Swab [[id number]] Collected:  12/05/18 0058    Order Status:  Completed Specimen:  Respiratory Updated:  12/06/18 1156     Respiratory Virus Panel, source Nasal Swab     RVP - Adenovirus Not Detected     Comment: Detects Serotypes B and E. Detection of Serotype C may   be limited. If Adenovirus infection is suspected and a   Not Detected result is returned the sample should be   re-tested for Adenovirus using an independent method  (e.g. [employer] Adenovirus Quantitative Real-Time  PCR test.          Enterovirus Not Detected     Comment: Cross-reactivity has been observed between certain Rhinovirus  strains and the Enterovirus assay.          Human Bocavirus Not Detected     Human Coronavirus Not Detected     Comment: The Human Coronavirus assay detects Human coronavirus types  229E, OC43,NL63 and HKU1.          RVP - Human Metapneumovirus (hMPV) Not Detected     RVP -  Influenza A Not Detected     Influenza A - J5G3-04 Not Detected     RVP - Influenza B Not Detected     Parainfluenza Not Detected     Respiratory Syncytial VirusVirus (RSV) A Not Detected     Comment: The Respiratory Syncytial Viral assay detects types A and B,  however it does not distinguish between the two.          RVP - Rhinovirus Not Detected     Comment: Cross-Reactivity has been observed between certain   Rhinovirus strains and the Enterovirus assay.  Target Enriched Mulitplex Polymerase Chain Reaction (TEM-PCR)  allows for the detection of multiple pathogens out of a single  reaction.  This test was developed and its performance   characteristics determined by [employer].  It has not   been cleared or approved by the U.[place].Food and Drug Administration.  Results should be used in conjunction with clinical findings,   and should not form the sole basis for a diagnosis or treatment  decision.  TEM-PCR is a licensed technology of [employer].         Narrative:       Receiving Lab:->[facility]          Microbiology Results (last 7 days)     Procedure Component Value Units Date/Time    Blood culture [[id number]] Collected:  12/04/18 2151    Order Status:  Completed Specimen:  Blood from Peripheral, Antecubital, Right Updated:  12/08/18 2312     Blood Culture, Routine No Growth to date     Blood Culture, Routine No Growth to date     Blood Culture, Routine No Growth to date     Blood Culture, Routine No Growth to date     Blood Culture, Routine No Growth to date    Blood Culture #1 **CANNOT BE ORDERED STAT** [[id number]] Collected:  12/04/18 2120    Order Status:  Completed Specimen:  Blood from Line, Subclavian, Right Updated:  12/08/18 2212     Blood Culture, Routine No Growth to date     Blood Culture, Routine No Growth to date     Blood Culture, Routine No Growth to date     Blood Culture, Routine No Growth to date     Blood Culture, Routine No Growth to date    Culture, Respiratory with Gram  Stain [[id number]]  (Susceptibility) Collected:  12/06/18 0924    Order Status:  Completed Specimen:  Respiratory from Sputum, Expectorated Updated:  12/08/18 1123     Respiratory Culture --     PSEUDOMONAS AERUGINOSA  Rare  Normal respiratory anibal also present       Gram Stain (Respiratory) <10 epithelial cells per low power field.     Gram Stain (Respiratory) Many WBC's     Gram Stain (Respiratory) Moderate Gram positive rods     Gram Stain (Respiratory) Moderate Gram negative rods     Gram Stain (Respiratory) Moderate budding yeast    Respiratory Viral Panel by PCR [facility]; Nasal Swab [[id number]] Collected:  12/05/18 0058    Order Status:  Completed Specimen:  Respiratory Updated:  12/06/18 1156     Respiratory Virus Panel, source Nasal Swab     RVP - Adenovirus Not Detected     Comment: Detects Serotypes B and E. Detection of Serotype C may   be limited. If Adenovirus infection is suspected and a   Not Detected result is returned the sample should be   re-tested for Adenovirus using an independent method  (e.g. [employer] Adenovirus Quantitative Real-Time  PCR test.          Enterovirus Not Detected     Comment: Cross-reactivity has been observed between certain Rhinovirus  strains and the Enterovirus assay.          Human Bocavirus Not Detected     Human Coronavirus Not Detected     Comment: The Human Coronavirus assay detects Human coronavirus types  229E, OC43,NL63 and HKU1.          RVP - Human Metapneumovirus (hMPV) Not Detected     RVP - Influenza A Not Detected     Influenza A - N0K1-19 Not Detected     RVP - Influenza B Not Detected     Parainfluenza Not Detected     Respiratory Syncytial VirusVirus (RSV) A Not Detected     Comment: The Respiratory Syncytial Viral assay detects types A and B,  however it does not distinguish between the two.          RVP - Rhinovirus Not Detected     Comment: Cross-Reactivity has been observed between certain   Rhinovirus strains and the Enterovirus assay.  Target  Enriched Mulitplex Polymerase Chain Reaction (TEM-PCR)  allows for the detection of multiple pathogens out of a single  reaction.  This test was developed and its performance   characteristics determined by [employer]  It has not   been cleared or approved by the U.S.Food and Drug Administration.  Results should be used in conjunction with clinical findings,   and should not form the sole basis for a diagnosis or treatment  decision.  TEM-PCR is a licensed technology of [employer].         Narrative:       Receiving Lab:->[facility]        I have reviewed all pertinent labs within the past 24 hours.

## 2018-12-09 NOTE — ASSESSMENT & PLAN NOTE
Continue prednisone 5 mg daily,  mg BID, and tacrolimus 2.5 mg BID.  Will monitor daily tacrolimus levels and adjust dose as needed.

## 2018-12-09 NOTE — ASSESSMENT & PLAN NOTE
Patient with persistent decline on outpatient PFTs. Concerns for progression of CARLOS EDUARDO versus infectious etiology. Will continue oxygen supplementation to maintain O2 sats >88%.  Continues to feel better.  Currently on room air.  Will perform bronchoscopy for cleanout. Patient requires bronchoscopy to be done in the OR, so will place request.

## 2018-12-09 NOTE — ASSESSMENT & PLAN NOTE
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 Concerns for progression of her CARLOS EDUARDO versus infectious etiology. RVP, sputum culture ordered. Procalcitonin negative. CXR without new infiltrates. Troponin and BNP unremarkable. CT chest 12/5 - GGOs relatively unchanged from prior examination. TTE on 12/5 with EF of 55%, mild left atrial enlargement, PAP 26, mild tricuspid regurg, normal LV/RV function.      Continue immunosuppression and ppx. Continue prn oxygen supplementation to maintain goal O2 sats >88%. RVP negative so Tamiflu stopped.  Culture growing Pseudomonas, so will continue Cefepime (patient is allergic to Cipro).  Vancomycin stopped on 12/7.  ID consulted, appreciate recs.  Will perform bronchoscopy for cleanout. Patient requires bronchoscopy to be done in the OR, so will place request.  Will add atrovent.

## 2018-12-10 NOTE — PLAN OF CARE
"Problem: Patient Care Overview  Goal: Plan of Care Review  Outcome: Ongoing (interventions implemented as appropriate)  Patient AAO I, prn O2 utilized for SOB.  Ins and ex wheezing noted.  Patient scheduled for bronch tomorrow 1300.  PRN pain medication given for "all over" generalized pain.  PRN benadryl, ativan, tizanidine given, as well as scheduled ms shira.  Patient has productive cough but refuses mucinex due to the "watery consistency" that the medication gives her sputum.  Patients vitals stable.  Patients  at the bedside attentive to patient.  IV cefepime on board, afebrile.      "

## 2018-12-10 NOTE — ANESTHESIA PREPROCEDURE EVALUATION
[facility]  Anesthesia Pre-Operative Evaluation         Patient Name: Juanita Ibarra  YOB: 1990  MRN: [id number]    SUBJECTIVE:     12/10/2018    Pre-operative evaluation for Procedure(s) (LRB):  BRONCHOSCOPY (Bilateral)    Juanita Ibarra is a 28 y.o. 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  She was started on Vancomycin, Cefepime and Tamiflu. Sputum positive for Pseudomonas. During this hospitalization had and acute elevation in transaminases. Hepatology consulted recommending imaging trending LFTs (US showing normal to mild fibrosis).  Patient has significant PONV despite prophylaxis and TIVA (she states that phenergan works best for her). She also reports recurrent post-op pruritis (she states that she always gets IV benadryl in PACU; it causes delayed emergence of given intra-op).  Patient is requesting lorazepam for pre-op anxiety (she is on regular lorazepam at home).    She is now scheduled for the above procedure.     LDA:       Port A Cath Single Lumen 12/08/18 2100 right subclavian (Active)   Number of days: 1       Previous airway:   LMA #3 for bronchoscopy on 10/1/2018  Easy mask ventilation, Hill, 6.0 ETT at 19 cm at lip, no complications      Drips:   None documented.      Patient Active Problem List   Diagnosis    Cystic fibrosis with pulmonary manifestations    Sinusitis, chronic    Anxiety disorder    Other pain disorders related to psychological factors    Protein calorie malnutrition    CF related Pancreatic insufficiency    Chronic pain with opiate use    Allergy to  multiple antibiotics    Dysphagia, oropharyngeal    Chronic visceral pain (pleura)    Gallstones    Hyperglycemia    Adverse effect of glucocorticoid or synthetic analogue    Immunosuppression    Complication of lung transplant    Dysphonia    Constipation    Portacath in place    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    Prophylactic antibiotic    Complication of transplanted lung    Acute rejection of lung transplant    Debility    Muscle spasm of back    Anemia    Headache    Aftercare following organ transplant    Leukopenia    LRTI (lower respiratory tract infection)    SBO (small bowel obstruction)    Sterilization    Lung replaced by transplant    Fever    Cytomegalovirus (CMV) viremia    Hospital acquired PNA    Hyperkalemia, diminished renal excretion    Acute kidney injury    Other complications of lung transplant    S/P lung transplant    Pneumonia, organism unspecified(486)    Lymphadenopathy    Biliary colic    Unilateral complete vocal fold paralysis    Bronchiolitis obliterans syndrome    Chronic ethmoidal sinusitis    Mastoiditis    ETD (Eustachian tube dysfunction), bilateral    Dysmenorrhea    Sinus infection    Hypertension    Pneumonia    Current chronic use of systemic steroids    TMJ arthralgia    Adverse effect of anabolic steroid    Steroid-induced hyperglycemia    Opioid overdose    Acute kidney failure with lesion of tubular necrosis    SOB (shortness of breath)    Thrombocytopenia    Splenomegaly       Review of patient's allergies indicates:   Allergen Reactions    Albuterol Palpitations    Colistin Anaphylaxis    Vancomycin analogues      Infusion reaction that does not resolve with slowing    Neupogen [filgrastim] Other (See Comments)     Ostealgia after five daily doses of 300 mcg.      Bactrim [sulfamethoxazole-trimethoprim] Hives    Ceftazidime Hives     Pt stated can tolerate cefapine not ceftazidime    Ceftazidime      Dronabinol Other (See Comments)     Mental changes/hallucinations    Haldol [haloperidol lactate] Other (See Comments)     Seizure like activity    Nsaids (non-steroidal anti-inflammatory drug)      Cannot have due to lung transplant    Adhesive Rash     Cloth tape- please use tegaderm or paper tape    Aztreonam Rash    Ciprofloxacin Nausea And Vomiting     Projectile N/V, per patient.  Unwilling to retry therapy.       Current Inpatient Medications:   amLODIPine  10 mg Oral Daily    ARIPiprazole  2 mg Oral Daily    calcium-vitamin D3  1 tablet Oral BID WM    ceFEPime (MAXIPIME) IVPB  1 g Intravenous Q8H    cetirizine  5 mg Oral Daily    dapsone  100 mg Oral Daily    diphenhydrAMINE  50 mg Intravenous BID    enoxaparin  40 mg Subcutaneous Daily    fluconazole  200 mg Oral Daily    fluticasone  1 spray Each Nare Daily    fluticasone-vilanterol  1 puff Inhalation Daily    folic acid  1,000 mcg Oral Daily    gabapentin  300 mg Oral TID    guaiFENesin  600 mg Oral BID    ipratropium  0.5 mg Nebulization Q6H    levalbuterol  1.25 mg Nebulization Q6H    lipase-protease-amylase 24,000-76,000-120,000 units  5 capsule Oral TID WM    magnesium oxide  400 mg Oral BID    metoprolol tartrate  25 mg Oral BID    montelukast  10 mg Oral Daily    morphine  15 mg Oral BID    multivit-min-FA-coenzyme Q10 100-5 mcg-mg  1 tablet Oral Daily    mycophenolate  500 mg Oral BID    pantoprazole  40 mg Oral Daily    polyethylene glycol  17 g Oral BID    predniSONE  5 mg Oral Daily    tacrolimus  2.5 mg Oral BID    topiramate  25 mg Oral BID    venlafaxine  150 mg Oral QHS    venlafaxine  37.5 mg Oral QHS       No current facility-administered medications on file prior to encounter.      Current Outpatient Medications on File Prior to Encounter   Medication Sig Dispense Refill    amLODIPine (NORVASC) 10 MG tablet Take 1 tablet (10 mg total) by mouth once daily. 30 tablet 11    aripiprazole (ABILIFY) 2 MG  Tab Take 2 mg by mouth once daily.      calcium-vitamin D3 (OS-KATY 500 + D3) 500 mg(1,250mg) -200 unit per tablet Take 1 tablet by mouth 2 (two) times daily with meals. 60 tablet 11    dapsone 100 MG Tab Take 1 tablet (100 mg total) by mouth once daily. 30 tablet 11    diphenhydrAMINE (BENADRYL) 50 MG tablet Take 1 tablet (50 mg total) by mouth every 6 (six) hours as needed for Itching. 1 tablet 0    DULERA 100-5 mcg/actuation HFAA INHALE 1 PUFF BY MOUTH TWICE DAILY 13 g 0    fexofenadine (ALLEGRA) 180 MG tablet Take 180 mg by mouth once daily.      fluconazole (DIFLUCAN) 150 MG Tab TAKE 1 TABLET BY MOUTH EVERY WEEK 30 tablet 0    fluticasone (FLONASE) 50 mcg/actuation nasal spray INSERT 2 SPRAYS IN EACH NOSTRIL DAILY 16 g 5    folic acid (FOLVITE) 1 MG tablet Take 1 tablet (1,000 mcg total) by mouth once daily. 30 tablet 11    gabapentin (NEURONTIN) 300 MG capsule Take 1 capsule (300 mg total) by mouth 3 (three) times daily. 90 capsule 11    inhalation spacing device (PROCHAMBER) USE AS DIRECTED 1 Device 0    levalbuterol (XOPENEX) 1.25 mg/3 mL nebulizer solution Take 3 mLs (1.25 mg total) by nebulization every 8 (eight) hours as needed for Wheezing or Shortness of Breath. Rescue 75 mL 5    lipase-protease-amylase (CREON) 36,000-114,000- 180,000 unit CpDR Take 4 capsules by mouth 3 (three) times daily with meals. Take 3 with snacks prn. (Patient taking differently: Take 5 capsules by mouth 3 (three) times daily with meals. Take 3 with snacks prn.) 800 capsule 11    LORazepam (ATIVAN) 1 MG tablet Take 2 mg by mouth every 6 (six) hours as needed for Anxiety.       magnesium oxide (MAG-OX) 400 mg tablet Take 1 tablet (400 mg total) by mouth 2 (two) times daily. 60 tablet 11    metoprolol tartrate (LOPRESSOR) 25 MG tablet Take 1 tablet (25 mg total) by mouth 2 (two) times daily. 60 tablet 11    montelukast (SINGULAIR) 10 mg tablet Take 1 tablet (10 mg total) by mouth once daily. 30 tablet 11     morphine (MS CONTIN) 15 MG 12 hr tablet Take 15 mg by mouth 2 (two) times daily.       multivit,min52-folic-vitK-cQ10 (AQUADEKS) 100-700-10 mcg-mcg-mg Cap cap 1 tab twice daily 60 capsule 0    mycophenolate (CELLCEPT) 250 mg Cap Take 2 capsules (500 mg total) by mouth 2 (two) times daily. 120 capsule 11    nystatin (MYCOSTATIN) 100,000 unit/mL suspension Take 5 mLs (500,000 Units total) by mouth 3 (three) times daily. for 10 days 473 mL 1    omeprazole (PRILOSEC) 40 MG capsule Take 1 capsule (40 mg total) by mouth every morning. 30 capsule 11    ondansetron (ZOFRAN) 8 MG tablet TAKE 1 TABLET(8 MG) BY MOUTH EVERY 8 HOURS AS NEEDED FOR NAUSEA 30 tablet 0    oxycodone (ROXICODONE) 5 MG immediate release tablet Take 10 mg by mouth every 4 (four) hours as needed for Pain.      polyethylene glycol (GLYCOLAX) 17 gram PwPk Take 17 g by mouth 2 (two) times daily. 60 packet 11    predniSONE (DELTASONE) 5 MG tablet Take 1 tablet (5 mg total) by mouth once daily. 30 tablet 11    promethazine (PHENERGAN) 25 MG tablet TAKE 1 TABLET(25 MG) BY MOUTH EVERY 8 HOURS AS NEEDED 60 tablet 0    tacrolimus (PROGRAF) 0.5 MG Cap Take 1 capsule (0.5 mg total) by mouth once daily. 30 capsule 11    tacrolimus (PROGRAF) 1 MG Cap Daily doses: 2 mg in am, 1.5 mg in pm by mouth 90 capsule 11    tiZANidine (ZANAFLEX) 4 MG tablet TAKE 2 TABLETS BY MOUTH EVERY 6 HOURS AS NEEDED 90 tablet 0    topiramate (TOPAMAX) 25 MG tablet Take 1 tablet (25 mg total) by mouth 2 (two) times daily. 60 tablet 11    venlafaxine (EFFEXOR-XR) 75 MG 24 hr capsule Take 1 capsule (75 mg total) by mouth every evening. (Patient taking differently: Take 150 mg by mouth every evening. ) 30 capsule 11       Past Surgical History:   Procedure Laterality Date    ABDOMINAL SURGERY      peg tube     [id number] N/A 5/19/2015    Performed by [facility]y [facility]    BIOPSY-BRONCHUS N/A 12/20/2016    Performed by Jake Alvarez,  MD at [facility] OR 2ND FLR    BRONCHOSCOPY N/A 5/29/2018    Procedure: BRONCHOSCOPY; Bronchoscopy with BAL and transbronchial biopsies under general anesthesia;  Surgeon: Jake Alvarez MD;  [facility];  Service: Transplant;  Laterality: N/A;    BRONCHOSCOPY N/A 10/1/2018    Procedure: BRONCHOSCOPY;  Surgeon: Jake Alvarez MD;  [facility];  Service: Transplant;  Laterality: N/A;    BRONCHOSCOPY N/A 10/1/2018    Performed by Jake Alvarez MD at [facility] OR 2ND [facility]    BRONCHOSCOPY Bilateral 12/20/2016    Performed by Jake Alvarez MD at [facility] OR 2ND [facility]LR    BRONCHOSCOPY - flexible bronchoscopy with probable tissue biopsy CPT 10466 N/A 7/21/2015    Performed by Jake Alvarez MD at [facility] OR 2ND [facility]    BRONCHOSCOPY - flexible bronchoscopy with probable tissue biospy CPT 98622 N/A 10/20/2015    Performed by Jake Alvarez MD at [facility] OR 2ND [facility]LR    BRONCHOSCOPY - flexible bronchoscopy with tissue biopsy CPT 43959 N/A 9/29/2015    Performed by Jake Alvarez MD at [facility] OR 2ND [facility]LR    BRONCHOSCOPY - flexible bronchoscopy with tissue biopsy CPT 63100 N/A 5/26/2015    Performed by Jake Alvarez MD at [facility] OR 1ST FLR    BRONCHOSCOPY flexible bronchoscopy with tissue biopsy N/A 9/8/2014    Performed by Jake Alvarez MD at [facility] OR 2ND [facility]LR    BRONCHOSCOPY flexible with possible tissue biopsy N/A 8/8/2014    Performed by Jake Alvarez MD at [facility] OR 2ND [facility]LR    BRONCHOSCOPY, BAL, BIOPSIES N/A 3/20/2018    Performed by Jake Alvarez MD at [facility] OR 2ND [facility]LR    BRONCHOSCOPY-FIBEROPTIC N/A 1/29/2016    Performed by Joann Cifuentes DO at [facility] OR 2ND FLR    BRONCHOSCOPY; Bronchoscopy with BAL and transbronchial biopsies under general anesthesia N/A 5/29/2018    [facility]    CHOLECYSTECTOMY  2016    CHOLECYSTECTOMY-LAPAROSCOPIC N/A 7/22/2016    Performed by Joshua Goldberg, MD at [facility] OR [address] [place] [address]    ENDOMETRIAL  ABLATION  2015    KJB    FESS      FESS Bilateral 10/21/2013    Performed by Jared Whatley MD at [facility] OR [address] [place] [address]    FINE NEEDLE ASPIRATION (FNA)  of a cervical lymphadenopathy  1/29/2016    Performed by Joann Cifuentes DO at [facility] OR [address] [place] [address]    flex bronchoscopy with probable tissue biopsy N/A 7/31/2014    Performed by [facility] Provider at [facility] OR [address] [place] [address]    flexible bronchoscopy with tissue biopsy N/A 12/11/2014    Performed by Jake Alvarez MD at [facility] OR [address] [place] [address]    INJECTION-VOCAL CORD Left 6/24/2014    Performed by Jared Whatley MD at [facility] OR [address] [place] [address]    [id number] to right chest and removal of portacath to left chests wall. Bilateral 6/24/2014    Performed by Zhen Dorado MD at [facility] OR [address] [place] [address]    LARYNX SURGERY  2016    LUNG TRANSPLANT Bilateral 6/12/14    MYRINGOTOMY W/ TUBES Right 04/2017    MYRINGOTOMY WITH INSERTION OF PE TUBES Right 4/15/2017    Performed by Gary Null MD at [facility] OR [address] [place] [address]    PLACEMENT-TUBE-PEG  5/15/2014    Performed by David Moses MD at [facility] OR [address] [place] [address]    PORTACATH PLACEMENT Right     rt sc    REMOVAL-TUBE-PEG  5/15/2014    Performed by David Moses MD at [facility] OR [address] [place] [address]    ROBOTIC ASSISTED LAPAROSCOPIC HYSTERECTOMY N/A 4/25/2017    Performed by [facility] Jr[facility] at [facility] OR    SALPINGECTOMY Bilateral 2015    KJB    SALPINGECTOMY-LAPAROSCOPIC Bilateral 5/19/2015    [facility] at [facility] OR    SINUS SURGERY FUNCTIONAL ENDOSCOPIC WITH NAVIGATION Bilateral 4/3/2017    Performed by Cesar Olsen MD at [facility] OR [address] [place] [address]    SINUS SURGERY FUNCTIONAL ENDOSCOPIC WITH NAVIGATION, 63239, 83077, 36155, 04739 Bilateral 2/5/2015    Performed by Cesar Olsen MD at [facility] OR [address] [place] [address]    THYROPLASTY - Medialization laryngoplasty, cricothyroid subluxation, arytenoid repositioning Left 8/2/2016    Performed by Gary Null MD at [facility] OR [address] [place] [address]    TRANSPLANT-LUNG Bilateral 6/11/2014     Performed by Adolfo Huston MD at [facility] OR [address] [place] [address]       Social History     Socioeconomic History    Marital status: Single     Spouse name: Not on file    Number of children: Not on file    Years of education: Not on file    Highest education level: Not on file   Social Needs    Financial resource strain: Not on file    Food insecurity - worry: Not on file    Food insecurity - inability: Not on file    Transportation needs - medical: Not on file    Transportation needs - non-medical: Not on file   Occupational History    Not on file   Tobacco Use    Smoking status: Never Smoker    Smokeless tobacco: Never Used   Substance and Sexual Activity    Alcohol use: No     Comment: Has not had an alcoholic drink in more than 2 months.    Drug use: No    Sexual activity: Yes     Partners: Male     Birth control/protection: Implant   Other Topics Concern    Not on file   Social History Narrative    Not on file       OBJECTIVE:     Vital Signs Range (Last 24H):  Temp:  [36.7 °C (98.1 °F)-37.1 °C (98.7 °F)]   Pulse:  []   Resp:  [16-25]   BP: (103-116)/(63-70)   SpO2:  [95 %-100 %]       Significant Labs:  Lab Results   Component Value Date    WBC 4.91 12/10/2018    HGB 7.9 (L) 12/10/2018    HCT 28.3 (L) 12/10/2018     (L) 12/10/2018    CHOL 157 06/29/2017    TRIG 84 06/29/2017    HDL 57 06/29/2017    ALT 28 12/10/2018    AST 13 12/10/2018     12/10/2018    K 4.6 12/10/2018     12/10/2018    CREATININE 1.2 12/10/2018    BUN 31 (H) 12/10/2018    CO2 25 12/10/2018    INR 1.0 11/26/2018    HGBA1C 4.7 05/26/2018         Diagnostic Studies:  CT Chest Without Contrast (12/5/2018):  - Patient with history of cystic fibrosis status post bilateral sequential lung transplant demonstrating overall remarkable improvement in the previously seen nodular soft tissue opacities at the lung bases.  Appearance of the pulmonary parenchyma appears back to baseline as compared to December 2016.  - New  thickening of the posterior wall of the bronchus intermedius which may represent inflammation.  - Cholecystectomy and severe atrophy of the pancreas unchanged.      Cardiac Studies:  EKG (12/5/2018):   Vent. Rate : 103 BPM     Atrial Rate : 107 BPM     P-R Int : 150 ms          QRS Dur : 076 ms      QT Int : 320 ms       P-R-T Axes : 069 069 079 degrees     QTc Int : 419 ms  Age and gender specific analysis  Sinus tachycardia  Nonspecific T wave abnormality  ST segment elevation Inferior leads Less prominent than previously but ME  depression persists suggesting Peroicarditis  When compared with ECG of 04-DEC-2018 21:00, See above  T wave inversion more evident in anterior and high lateral leads  Confirmed by MEGAN KELLER MD ([id number]) on 12/5/2018 10:59:09 AM    2D Echo (12/5/2018):  · Normal left ventricular systolic function. The estimated ejection fraction is 55%  · Mild left atrial enlargement.  · Normal LV diastolic function.  · Normal right ventricular systolic function.  · Normal right atrial size.  · Mild tricuspid regurgitation.  · The estimated PA systolic pressure is 26mm Hg  · Normal central venous pressure (3 mm Hg).       ASSESSMENT/PLAN:         Anesthesia Evaluation    I have reviewed the Patient Summary Reports.    I have reviewed the Nursing Notes.   I have reviewed the Medications.   Prednisone    Review of Systems  Anesthesia Hx:  Hx of Anesthetic complications PONV and pruritis History of prior surgery of interest to airway management or planning: (laryngeal reconstruction) lung surgery. Previous anesthesia: General Airway issues documented on chart review include GETA  Denies Family Hx of Anesthesia complications.  Personal Hx of Anesthesia complications (post-op pruritis ), Post-Operative Nausea/Vomiting (no nausea with TIVA and prophylaxtic meds), with every anesthetic, despite treatment   Social:  Non-Smoker    Hematology/Oncology:         -- Anemia (chronic anemia):   EENT/Dental:   chronic  allergic rhinitis Laryngeal reconstruction August 3, 2016 (vocal paralysis after transplant suirgery)   Otitis Media   Cardiovascular:   Hypertension, well controlled    Pulmonary:   Pneumonia Shortness of breath Cystic Fibrosis  S/P Bilateral Lung Transplant June 2014   Renal/:   Chronic Renal Disease    Hepatic/GI:   GERD, well controlled Pancreatic insufficiency as result of CF   Musculoskeletal:   chronic back pain as result of muscular injury and rib fractures as result of excessive coughing    Neurological:   Headaches Seizures (years ago, once, drug side effect)   Chronic Pain Syndrome   Endocrine:   Diabetes, well controlled    Psych:   Psychiatric History anxiety          Physical Exam  General:  Well nourished Moon facies    Airway/Jaw/Neck:  Airway Findings: Mouth Opening: Small, but > 3cm Tongue: Normal  General Airway Assessment: Adult  S/p laryngeal reconstruction  5.5 ETT  Mallampati: III  Improves to II with phonation.  TM Distance: 4 - 6 cm  Jaw/Neck Findings:  Neck ROM: Normal ROM  Neck Findings: Normal    Eyes/Ears/Nose:  EYES/EARS/NOSE FINDINGS: Normal   Dental:  Dental Findings: In tact, Periodontal disease, Mild   Chest/Lungs:  Chest/Lungs Findings: Normal Respiratory Rate, Rhonchi, Rales, Extensive     Heart/Vascular:  Heart Findings: Normal       Mental Status:  Mental Status Findings:  Cooperative, Alert and Oriented         Anesthesia Plan  Type of Anesthesia, risks & benefits discussed:  Anesthesia Type:  general  Patient's Preference:   Intra-op Monitoring Plan: standard ASA monitors  Intra-op Monitoring Plan Comments:   Post Op Pain Control Plan: IV/PO Opioids PRN, per primary service following discharge from PACU and multimodal analgesia  Post Op Pain Control Plan Comments:   Induction:   IV  Beta Blocker:  Patient is on a Beta-Blocker and has received one dose within the past 24 hours (No further documentation required).       Informed Consent: Patient understands risks and agrees  with Anesthesia plan.  Questions answered. Anesthesia consent signed with patient.  ASA Score: 3     Day of Surgery Review of History & Physical:    H&P update referred to the surgeon.     Anesthesia Plan Notes: OK for patient to have Gatorade between 12 AM and 2 AM        Ready For Surgery From Anesthesia Perspective.

## 2018-12-10 NOTE — ASSESSMENT & PLAN NOTE
Patient with persistent decline on outpatient PFTs. Concerns for progression of CARLOS EDUARDO versus infectious etiology. Will continue oxygen supplementation to maintain O2 sats >88% prn, but continues to feel better, and remains on room air.  Will perform bronchoscopy for cleanout on Tuesday. Tentatively plan for discharge on Wednesday.

## 2018-12-10 NOTE — PROGRESS NOTES
[facility]  Lung Transplant  Progress Note - Floor    Patient Name: Juanita Ibarra  MRN: [id number]  Admission Date: 12/4/2018  Hospital Length of Stay: 6 days  Post-Operative Day: 1642  Attending Physician: Francisca Morin DO  Primary Care Provider: Jake Alvarez MD     Subjective:     Interval History: No acute events overnight.  OR bronch scheduled for tomorrow.      Continuous Infusions:  Scheduled Meds:   amLODIPine  10 mg Oral Daily    ARIPiprazole  2 mg Oral Daily    calcium-vitamin D3  1 tablet Oral BID WM    ceFEPime (MAXIPIME) IVPB  1 g Intravenous Q8H    cetirizine  5 mg Oral Daily    dapsone  100 mg Oral Daily    diphenhydrAMINE  50 mg Intravenous BID    enoxaparin  40 mg Subcutaneous Daily    fluconazole  200 mg Oral Daily    fluticasone  1 spray Each Nare Daily    fluticasone-vilanterol  1 puff Inhalation Daily    folic acid  1,000 mcg Oral Daily    gabapentin  300 mg Oral TID    guaiFENesin  600 mg Oral BID    ipratropium  0.5 mg Nebulization Q6H    levalbuterol  1.25 mg Nebulization Q6H    lipase-protease-amylase 24,000-76,000-120,000 units  5 capsule Oral TID WM    magnesium oxide  400 mg Oral BID    metoprolol tartrate  25 mg Oral BID    montelukast  10 mg Oral Daily    morphine  15 mg Oral BID    multivit-min-FA-coenzyme Q10 100-5 mcg-mg  1 tablet Oral Daily    mycophenolate  500 mg Oral BID    pantoprazole  40 mg Oral Daily    polyethylene glycol  17 g Oral BID    predniSONE  5 mg Oral Daily    tacrolimus  2.5 mg Oral BID    topiramate  25 mg Oral BID    venlafaxine  150 mg Oral QHS    venlafaxine  37.5 mg Oral QHS     PRN Meds:acetaminophen, butalbital-acetaminophen-caffeine -40 mg, diphenhydrAMINE, heparin, porcine (PF), LORazepam, ondansetron, oxyCODONE, promethazine (PHENERGAN) IVPB, tiZANidine    Review of patient's allergies indicates:   Allergen Reactions    Albuterol Palpitations    Colistin Anaphylaxis     Vancomycin analogues      Infusion reaction that does not resolve with slowing    Neupogen [filgrastim] Other (See Comments)     Ostealgia after five daily doses of 300 mcg.      Bactrim [sulfamethoxazole-trimethoprim] Hives    Ceftazidime Hives     Pt stated can tolerate cefapine not ceftazidime    Ceftazidime     Dronabinol Other (See Comments)     Mental changes/hallucinations    Haldol [haloperidol lactate] Other (See Comments)     Seizure like activity    Nsaids (non-steroidal anti-inflammatory drug)      Cannot have due to lung transplant    Adhesive Rash     Cloth tape- please use tegaderm or paper tape    Aztreonam Rash    Ciprofloxacin Nausea And Vomiting     Projectile N/V, per patient.  Unwilling to retry therapy.       Review of Systems   Constitutional: Negative for activity change, appetite change, chills, diaphoresis, fatigue, fever and unexpected weight change.   HENT: Negative for congestion, drooling, ear discharge, ear pain, facial swelling, hearing loss, sore throat and trouble swallowing.    Eyes: Negative for discharge and itching.   Respiratory: Positive for cough (intermittently productive). Negative for apnea, choking, chest tightness, shortness of breath, wheezing and stridor.    Cardiovascular: Positive for chest pain (pleuritic). Negative for palpitations and leg swelling.   Gastrointestinal: Negative for abdominal distention, abdominal pain, blood in stool and constipation.   Endocrine: Negative.    Genitourinary: Negative for difficulty urinating and dysuria.   Musculoskeletal: Negative for arthralgias, back pain, gait problem, joint swelling and myalgias.   Skin: Negative for color change and pallor.   Neurological: Negative for dizziness and headaches.   Hematological: Negative for adenopathy.   Psychiatric/Behavioral: Negative for agitation, behavioral problems and confusion.     Objective:   Physical Exam   Constitutional: She is oriented to person, place, and time. She  appears well-developed and well-nourished.   HENT:   Head: Normocephalic and atraumatic.   Mouth/Throat: No oropharyngeal exudate.   Eyes: EOM are normal. Pupils are equal, round, and reactive to light. Right eye exhibits no discharge. No scleral icterus.   Neck: Normal range of motion. No thyromegaly present.   Cardiovascular: Normal rate and regular rhythm. Exam reveals no friction rub.   No murmur heard.  Pulmonary/Chest: Effort normal. No stridor. No respiratory distress. She has no wheezes. She has rhonchi (scant). She has no rales.   Abdominal: Soft. She exhibits no distension. There is no tenderness. There is no guarding.   Musculoskeletal: She exhibits no edema or tenderness.   Neurological: She is alert and oriented to person, place, and time.   Skin: Skin is warm and dry.   Psychiatric: She has a normal mood and affect.         Vital Signs (Most Recent):  Temp: 98.5 °F (36.9 °C) (12/10/18 0736)  Pulse: 105 (12/10/18 0904)  Resp: (!) 25 (12/10/18 0904)  BP: 125/70 (12/10/18 0736)  SpO2: 100 % (12/10/18 0736) Vital Signs (24h Range):  Temp:  [98.1 °F (36.7 °C)-98.7 °F (37.1 °C)] 98.5 °F (36.9 °C)  Pulse:  [] 105  Resp:  [16-25] 25  SpO2:  [95 %-100 %] 100 %  BP: (103-125)/(63-70) 125/70     Weight: 56.7 kg (125 lb 0 oz)  Body mass index is 24.41 kg/m².      Intake/Output Summary (Last 24 hours) at 12/10/2018 1059  Last data filed at 12/10/2018 0517  Gross per 24 hour   Intake 500 ml   Output --   Net 500 ml       Significant Labs:  CBC:  Recent Labs   Lab 12/10/18  0500   WBC 4.91   RBC 2.89*   HGB 7.9*   HCT 28.3*   *   MCV 98   MCH 27.3   MCHC 27.9*     BMP:  Recent Labs   Lab 12/10/18  0500      K 4.6      CO2 25   BUN 31*   CREATININE 1.2   CALCIUM 9.0      Tacrolimus Levels:  Recent Labs   Lab 12/10/18  0500   TACROLIMUS 4.9*     Microbiology:  Microbiology Results (last 7 days)     Procedure Component Value Units Date/Time    Blood culture [[id number]] Collected:  12/04/18 3290     Order Status:  Completed Specimen:  Blood from Peripheral, Antecubital, Right Updated:  12/09/18 2312     Blood Culture, Routine No growth after 5 days.    Blood Culture #1 **CANNOT BE ORDERED STAT** [[id number]] Collected:  12/04/18 2120    Order Status:  Completed Specimen:  Blood from Line, Subclavian, Right Updated:  12/09/18 2212     Blood Culture, Routine No growth after 5 days.    Culture, Respiratory with Gram Stain [[id number]]  (Susceptibility) Collected:  12/06/18 0924    Order Status:  Completed Specimen:  Respiratory from Sputum, Expectorated Updated:  12/08/18 1123     Respiratory Culture --     PSEUDOMONAS AERUGINOSA  Rare  Normal respiratory anibal also present       Gram Stain (Respiratory) <10 epithelial cells per low power field.     Gram Stain (Respiratory) Many WBC's     Gram Stain (Respiratory) Moderate Gram positive rods     Gram Stain (Respiratory) Moderate Gram negative rods     Gram Stain (Respiratory) Moderate budding yeast    Respiratory Viral Panel by PCR [facility]; Nasal Swab [[id number]] Collected:  12/05/18 0058    Order Status:  Completed Specimen:  Respiratory Updated:  12/06/18 1156     Respiratory Virus Panel, source Nasal Swab     RVP - Adenovirus Not Detected     Comment: Detects Serotypes B and E. Detection of Serotype C may   be limited. If Adenovirus infection is suspected and a   Not Detected result is returned the sample should be   re-tested for Adenovirus using an independent method  (e.g. [employer] Adenovirus Quantitative Real-Time  PCR test.          Enterovirus Not Detected     Comment: Cross-reactivity has been observed between certain Rhinovirus  strains and the Enterovirus assay.          Human Bocavirus Not Detected     Human Coronavirus Not Detected     Comment: The Human Coronavirus assay detects Human coronavirus types  229E, OC43,NL63 and HKU1.          RVP - Human Metapneumovirus (hMPV) Not Detected     RVP - Influenza A Not Detected     Influenza A -  L3T3-30 Not Detected     RVP - Influenza B Not Detected     Parainfluenza Not Detected     Respiratory Syncytial VirusVirus (RSV) A Not Detected     Comment: The Respiratory Syncytial Viral assay detects types A and B,  however it does not distinguish between the two.          RVP - Rhinovirus Not Detected     Comment: Cross-Reactivity has been observed between certain   Rhinovirus strains and the Enterovirus assay.  Target Enriched Mulitplex Polymerase Chain Reaction (TEM-PCR)  allows for the detection of multiple pathogens out of a single  reaction.  This test was developed and its performance   characteristics determined by [employer] [place].  It has not   been cleared or approved by the U.S.Food and Drug Administration.  Results should be used in conjunction with clinical findings,   and should not form the sole basis for a diagnosis or treatment  decision.  TEM-PCR is a licensed technology of [employer].         Narrative:       Receiving Lab:->[facility]          Microbiology Results (last 7 days)     Procedure Component Value Units Date/Time    Blood culture [[id number]] Collected:  12/04/18 2151    Order Status:  Completed Specimen:  Blood from Peripheral, Antecubital, Right Updated:  12/09/18 2312     Blood Culture, Routine No growth after 5 days.    Blood Culture #1 **CANNOT BE ORDERED STAT** [[id number]] Collected:  12/04/18 2120    Order Status:  Completed Specimen:  Blood from Line, Subclavian, Right Updated:  12/09/18 2212     Blood Culture, Routine No growth after 5 days.    Culture, Respiratory with Gram Stain [[id number]]  (Susceptibility) Collected:  12/06/18 0924    Order Status:  Completed Specimen:  Respiratory from Sputum, Expectorated Updated:  12/08/18 1123     Respiratory Culture --     PSEUDOMONAS AERUGINOSA  Rare  Normal respiratory anibal also present       Gram Stain (Respiratory) <10 epithelial cells per low power field.     Gram Stain (Respiratory) Many WBC's     Gram Stain  (Respiratory) Moderate Gram positive rods     Gram Stain (Respiratory) Moderate Gram negative rods     Gram Stain (Respiratory) Moderate budding yeast    Respiratory Viral Panel by PCR [facility]; Nasal Swab [[id number]] Collected:  12/05/18 0058    Order Status:  Completed Specimen:  Respiratory Updated:  12/06/18 1156     Respiratory Virus Panel, source Nasal Swab     RVP - Adenovirus Not Detected     Comment: Detects Serotypes B and E. Detection of Serotype C may   be limited. If Adenovirus infection is suspected and a   Not Detected result is returned the sample should be   re-tested for Adenovirus using an independent method  (e.g. [employer] Adenovirus Quantitative Real-Time  PCR test.          Enterovirus Not Detected     Comment: Cross-reactivity has been observed between certain Rhinovirus  strains and the Enterovirus assay.          Human Bocavirus Not Detected     Human Coronavirus Not Detected     Comment: The Human Coronavirus assay detects Human coronavirus types  229E, OC43,NL63 and HKU1.          RVP - Human Metapneumovirus (hMPV) Not Detected     RVP - Influenza A Not Detected     Influenza A - O0V3-85 Not Detected     RVP - Influenza B Not Detected     Parainfluenza Not Detected     Respiratory Syncytial VirusVirus (RSV) A Not Detected     Comment: The Respiratory Syncytial Viral assay detects types A and B,  however it does not distinguish between the two.          RVP - Rhinovirus Not Detected     Comment: Cross-Reactivity has been observed between certain   Rhinovirus strains and the Enterovirus assay.  Target Enriched Mulitplex Polymerase Chain Reaction (TEM-PCR)  allows for the detection of multiple pathogens out of a single  reaction.  This test was developed and its performance   characteristics determined by [employer].  It has not   been cleared or approved by the U.S.Food and Drug Administration.  Results should be used in conjunction with clinical findings,   and should not form  the sole basis for a diagnosis or treatment  decision.  TEM-PCR is a licensed technology of [employer].         Narrative:       Receiving Lab:->[facility]        I have reviewed all pertinent labs within the past 24 hours.        Assessment/Plan:     * Complication of lung transplant    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 Concerns for progression of her CARLOS EDUARDO versus infectious etiology. RVP, sputum culture ordered. Procalcitonin negative. CXR without new infiltrates. Troponin and BNP unremarkable. CT chest 12/5 - GGOs relatively unchanged from prior examination. TTE on 12/5 with EF of 55%, mild left atrial enlargement, PAP 26, mild tricuspid regurg, normal LV/RV function.      Continue immunosuppression and ppx. Continue prn oxygen supplementation to maintain goal O2 sats >88%. RVP negative so Tamiflu stopped.  Culture growing Pseudomonas, so will continue Cefepime (patient is allergic to Cipro).  Vancomycin stopped on 12/7.  ID consulted, appreciate recs.  Will perform bronchoscopy for cleanout on Tuesday in the OR. Continue atrovent and xopenex nebs.           Bronchiolitis obliterans syndrome    Patient with persistent decline on outpatient PFTs. Concerns for progression of CARLOS EDUARDO versus infectious etiology. Will continue oxygen supplementation to maintain O2 sats >88% prn, but continues to feel better, and remains on room air.  Will perform bronchoscopy for cleanout on Tuesday. Tentatively plan for discharge on Wednesday.     Immunosuppression    Continue prednisone 5 mg daily,  mg BID, and tacrolimus 2.5 mg BID.  Will monitor daily tacrolimus levels and adjust dose as needed.      Prophylactic antibiotic    Continue dapsone 100 mg daily.      Anxiety disorder    Continue current outpatient regimen with Effexor, Abilify, and ativan.      CF  related Pancreatic insufficiency    Continue creon with meals and snacks.      Chronic pain with opiate use    Continue current outpatient pain regimen.          Reyes Will NP  Lung Transplant  [facility]

## 2018-12-10 NOTE — ASSESSMENT & PLAN NOTE
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 Concerns for progression of her CARLOS EDUARDO versus infectious etiology. RVP, sputum culture ordered. Procalcitonin negative. CXR without new infiltrates. Troponin and BNP unremarkable. CT chest 12/5 - GGOs relatively unchanged from prior examination. TTE on 12/5 with EF of 55%, mild left atrial enlargement, PAP 26, mild tricuspid regurg, normal LV/RV function.      Continue immunosuppression and ppx. Continue prn oxygen supplementation to maintain goal O2 sats >88%. RVP negative so Tamiflu stopped.  Culture growing Pseudomonas, so will continue Cefepime (patient is allergic to Cipro).  Vancomycin stopped on 12/7.  ID consulted, appreciate recs.  Will perform bronchoscopy for cleanout on Tuesday in the OR. Continue atrovent and xopenex nebs.

## 2018-12-10 NOTE — SUBJECTIVE & OBJECTIVE
Subjective:     Interval History: No acute events overnight.  OR bronch scheduled for tomorrow.      Continuous Infusions:  Scheduled Meds:   amLODIPine  10 mg Oral Daily    ARIPiprazole  2 mg Oral Daily    calcium-vitamin D3  1 tablet Oral BID WM    ceFEPime (MAXIPIME) IVPB  1 g Intravenous Q8H    cetirizine  5 mg Oral Daily    dapsone  100 mg Oral Daily    diphenhydrAMINE  50 mg Intravenous BID    enoxaparin  40 mg Subcutaneous Daily    fluconazole  200 mg Oral Daily    fluticasone  1 spray Each Nare Daily    fluticasone-vilanterol  1 puff Inhalation Daily    folic acid  1,000 mcg Oral Daily    gabapentin  300 mg Oral TID    guaiFENesin  600 mg Oral BID    ipratropium  0.5 mg Nebulization Q6H    levalbuterol  1.25 mg Nebulization Q6H    lipase-protease-amylase 24,000-76,000-120,000 units  5 capsule Oral TID WM    magnesium oxide  400 mg Oral BID    metoprolol tartrate  25 mg Oral BID    montelukast  10 mg Oral Daily    morphine  15 mg Oral BID    multivit-min-FA-coenzyme Q10 100-5 mcg-mg  1 tablet Oral Daily    mycophenolate  500 mg Oral BID    pantoprazole  40 mg Oral Daily    polyethylene glycol  17 g Oral BID    predniSONE  5 mg Oral Daily    tacrolimus  2.5 mg Oral BID    topiramate  25 mg Oral BID    venlafaxine  150 mg Oral QHS    venlafaxine  37.5 mg Oral QHS     PRN Meds:acetaminophen, butalbital-acetaminophen-caffeine -40 mg, diphenhydrAMINE, heparin, porcine (PF), LORazepam, ondansetron, oxyCODONE, promethazine (PHENERGAN) IVPB, tiZANidine    Review of patient's allergies indicates:   Allergen Reactions    Albuterol Palpitations    Colistin Anaphylaxis    Vancomycin analogues      Infusion reaction that does not resolve with slowing    Neupogen [filgrastim] Other (See Comments)     Ostealgia after five daily doses of 300 mcg.      Bactrim [sulfamethoxazole-trimethoprim] Hives    Ceftazidime Hives     Pt stated can tolerate cefapine not ceftazidime    Ceftazidime      Dronabinol Other (See Comments)     Mental changes/hallucinations    Haldol [haloperidol lactate] Other (See Comments)     Seizure like activity    Nsaids (non-steroidal anti-inflammatory drug)      Cannot have due to lung transplant    Adhesive Rash     Cloth tape- please use tegaderm or paper tape    Aztreonam Rash    Ciprofloxacin Nausea And Vomiting     Projectile N/V, per patient.  Unwilling to retry therapy.       Review of Systems   Constitutional: Negative for activity change, appetite change, chills, diaphoresis, fatigue, fever and unexpected weight change.   HENT: Negative for congestion, drooling, ear discharge, ear pain, facial swelling, hearing loss, sore throat and trouble swallowing.    Eyes: Negative for discharge and itching.   Respiratory: Positive for cough (intermittently productive). Negative for apnea, choking, chest tightness, shortness of breath, wheezing and stridor.    Cardiovascular: Positive for chest pain (pleuritic). Negative for palpitations and leg swelling.   Gastrointestinal: Negative for abdominal distention, abdominal pain, blood in stool and constipation.   Endocrine: Negative.    Genitourinary: Negative for difficulty urinating and dysuria.   Musculoskeletal: Negative for arthralgias, back pain, gait problem, joint swelling and myalgias.   Skin: Negative for color change and pallor.   Neurological: Negative for dizziness and headaches.   Hematological: Negative for adenopathy.   Psychiatric/Behavioral: Negative for agitation, behavioral problems and confusion.     Objective:   Physical Exam   Constitutional: She is oriented to person, place, and time. She appears well-developed and well-nourished.   HENT:   Head: Normocephalic and atraumatic.   Mouth/Throat: No oropharyngeal exudate.   Eyes: EOM are normal. Pupils are equal, round, and reactive to light. Right eye exhibits no discharge. No scleral icterus.   Neck: Normal range of motion. No thyromegaly present.    Cardiovascular: Normal rate and regular rhythm. Exam reveals no friction rub.   No murmur heard.  Pulmonary/Chest: Effort normal. No stridor. No respiratory distress. She has no wheezes. She has rhonchi (scant). She has no rales.   Abdominal: Soft. She exhibits no distension. There is no tenderness. There is no guarding.   Musculoskeletal: She exhibits no edema or tenderness.   Neurological: She is alert and oriented to person, place, and time.   Skin: Skin is warm and dry.   Psychiatric: She has a normal mood and affect.         Vital Signs (Most Recent):  Temp: 98.5 °F (36.9 °C) (12/10/18 0736)  Pulse: 105 (12/10/18 0904)  Resp: (!) 25 (12/10/18 0904)  BP: 125/70 (12/10/18 0736)  SpO2: 100 % (12/10/18 0736) Vital Signs (24h Range):  Temp:  [98.1 °F (36.7 °C)-98.7 °F (37.1 °C)] 98.5 °F (36.9 °C)  Pulse:  [] 105  Resp:  [16-25] 25  SpO2:  [95 %-100 %] 100 %  BP: (103-125)/(63-70) 125/70     Weight: 56.7 kg (125 lb 0 oz)  Body mass index is 24.41 kg/m².      Intake/Output Summary (Last 24 hours) at 12/10/2018 1059  Last data filed at 12/10/2018 0517  Gross per 24 hour   Intake 500 ml   Output --   Net 500 ml       Significant Labs:  CBC:  Recent Labs   Lab 12/10/18  0500   WBC 4.91   RBC 2.89*   HGB 7.9*   HCT 28.3*   *   MCV 98   MCH 27.3   MCHC 27.9*     BMP:  Recent Labs   Lab 12/10/18  0500      K 4.6      CO2 25   BUN 31*   CREATININE 1.2   CALCIUM 9.0      Tacrolimus Levels:  Recent Labs   Lab 12/10/18  0500   TACROLIMUS 4.9*     Microbiology:  Microbiology Results (last 7 days)     Procedure Component Value Units Date/Time    Blood culture [[id number]] Collected:  12/04/18 7671    Order Status:  Completed Specimen:  Blood from Peripheral, Antecubital, Right Updated:  12/09/18 2312     Blood Culture, Routine No growth after 5 days.    Blood Culture #1 **CANNOT BE ORDERED STAT** [[id number]] Collected:  12/04/18 2120    Order Status:  Completed Specimen:  Blood from Line, Subclavian,  Right Updated:  12/09/18 2212     Blood Culture, Routine No growth after 5 days.    Culture, Respiratory with Gram Stain [[id number]]  (Susceptibility) Collected:  12/06/18 0924    Order Status:  Completed Specimen:  Respiratory from Sputum, Expectorated Updated:  12/08/18 1123     Respiratory Culture --     PSEUDOMONAS AERUGINOSA  Rare  Normal respiratory anibal also present       Gram Stain (Respiratory) <10 epithelial cells per low power field.     Gram Stain (Respiratory) Many WBC's     Gram Stain (Respiratory) Moderate Gram positive rods     Gram Stain (Respiratory) Moderate Gram negative rods     Gram Stain (Respiratory) Moderate budding yeast    Respiratory Viral Panel by PCR [facility]; Nasal Swab [[id number]] Collected:  12/05/18 0058    Order Status:  Completed Specimen:  Respiratory Updated:  12/06/18 1156     Respiratory Virus Panel, source Nasal Swab     RVP - Adenovirus Not Detected     Comment: Detects Serotypes B and E. Detection of Serotype C may   be limited. If Adenovirus infection is suspected and a   Not Detected result is returned the sample should be   re-tested for Adenovirus using an independent method  (e.g. [employer] Adenovirus Quantitative Real-Time  PCR test.          Enterovirus Not Detected     Comment: Cross-reactivity has been observed between certain Rhinovirus  strains and the Enterovirus assay.          Human Bocavirus Not Detected     Human Coronavirus Not Detected     Comment: The Human Coronavirus assay detects Human coronavirus types  229E, OC43,NL63 and HKU1.          RVP - Human Metapneumovirus (hMPV) Not Detected     RVP - Influenza A Not Detected     Influenza A - A4H6-66 Not Detected     RVP - Influenza B Not Detected     Parainfluenza Not Detected     Respiratory Syncytial VirusVirus (RSV) A Not Detected     Comment: The Respiratory Syncytial Viral assay detects types A and B,  however it does not distinguish between the two.          RVP - Rhinovirus Not Detected      Comment: Cross-Reactivity has been observed between certain   Rhinovirus strains and the Enterovirus assay.  Target Enriched Mulitplex Polymerase Chain Reaction (TEM-PCR)  allows for the detection of multiple pathogens out of a single  reaction.  This test was developed and its performance   characteristics determined by [employer].  It has not   been cleared or approved by the U.[place].Food and Drug Administration.  Results should be used in conjunction with clinical findings,   and should not form the sole basis for a diagnosis or treatment  decision.  TEM-PCR is a licensed technology of [employer].         Narrative:       Receiving Lab:->[facility]          Microbiology Results (last 7 days)     Procedure Component Value Units Date/Time    Blood culture [[id number]] Collected:  12/04/18 2151    Order Status:  Completed Specimen:  Blood from Peripheral, Antecubital, Right Updated:  12/09/18 2312     Blood Culture, Routine No growth after 5 days.    Blood Culture #1 **CANNOT BE ORDERED STAT** [[id number]] Collected:  12/04/18 2120    Order Status:  Completed Specimen:  Blood from Line, Subclavian, Right Updated:  12/09/18 2212     Blood Culture, Routine No growth after 5 days.    Culture, Respiratory with Gram Stain [[id number]]  (Susceptibility) Collected:  12/06/18 0924    Order Status:  Completed Specimen:  Respiratory from Sputum, Expectorated Updated:  12/08/18 1123     Respiratory Culture --     PSEUDOMONAS AERUGINOSA  Rare  Normal respiratory anibal also present       Gram Stain (Respiratory) <10 epithelial cells per low power field.     Gram Stain (Respiratory) Many WBC's     Gram Stain (Respiratory) Moderate Gram positive rods     Gram Stain (Respiratory) Moderate Gram negative rods     Gram Stain (Respiratory) Moderate budding yeast    Respiratory Viral Panel by PCR [facility]; Nasal Swab [[id number]] Collected:  12/05/18 0058    Order Status:  Completed Specimen:  Respiratory Updated:  12/06/18  1156     Respiratory Virus Panel, source Nasal Swab     RVP - Adenovirus Not Detected     Comment: Detects Serotypes B and E. Detection of Serotype C may   be limited. If Adenovirus infection is suspected and a   Not Detected result is returned the sample should be   re-tested for Adenovirus using an independent method  (e.g. [employer] Adenovirus Quantitative Real-Time  PCR test.          Enterovirus Not Detected     Comment: Cross-reactivity has been observed between certain Rhinovirus  strains and the Enterovirus assay.          Human Bocavirus Not Detected     Human Coronavirus Not Detected     Comment: The Human Coronavirus assay detects Human coronavirus types  229E, OC43,NL63 and HKU1.          RVP - Human Metapneumovirus (hMPV) Not Detected     RVP - Influenza A Not Detected     Influenza A - V6T1-51 Not Detected     RVP - Influenza B Not Detected     Parainfluenza Not Detected     Respiratory Syncytial VirusVirus (RSV) A Not Detected     Comment: The Respiratory Syncytial Viral assay detects types A and B,  however it does not distinguish between the two.          RVP - Rhinovirus Not Detected     Comment: Cross-Reactivity has been observed between certain   Rhinovirus strains and the Enterovirus assay.  Target Enriched Mulitplex Polymerase Chain Reaction (TEM-PCR)  allows for the detection of multiple pathogens out of a single  reaction.  This test was developed and its performance   characteristics determined by [employer].  It has not   been cleared or approved by the U.S.Food and Drug Administration.  Results should be used in conjunction with clinical findings,   and should not form the sole basis for a diagnosis or treatment  decision.  TEM-PCR is a licensed technology of [employer], [employer].         Narrative:       Receiving Lab:->[facility]        I have reviewed all pertinent labs within the past 24 hours.

## 2018-12-11 NOTE — TRANSFER OF CARE
Anesthesia Transfer of Care Note    Patient: Juanita Ibarra    Procedure(s) Performed: Procedure(s) (LRB):  BRONCHOSCOPY (Bilateral)    Patient location: PACU    Anesthesia Type: general    Transport from OR: Transported from OR on 6-10 L/min O2 by face mask with adequate spontaneous ventilation    Post pain: adequate analgesia    Post assessment: no apparent anesthetic complications and tolerated procedure well    Post vital signs: stable    Level of consciousness: responds to stimulation and sedated    Nausea/Vomiting: no nausea/vomiting    Complications: none    Transfer of care protocol was followed      Last vitals:   Visit Vitals  /67 (BP Location: Right arm, Patient Position: Lying)   Pulse 78   Temp 37 °C (98.6 °F) (Temporal)   Resp 19   Ht 5' (1.524 m)   Wt 56.7 kg (125 lb 0 oz)   LMP 10/02/2016   SpO2 99%   Breastfeeding? No   BMI 24.41 kg/m²

## 2018-12-11 NOTE — PLAN OF CARE
Problem: Patient Care Overview  Goal: Plan of Care Review  Outcome: Ongoing (interventions implemented as appropriate)  Pt is AAOx4 in bed wearing non-skid footwear, bed in low/locked position and with call bell within reach. Pt reminded to use call bell to call for assistance, pt verbalizes understanding.  at bedside. Pt is afebrile at this time. Proper hand hygiene performed before and after pt care activities. NPO since midnight for bronch. PRN pain and anxiety medications administered during night. Patient currently resting comfortably.

## 2018-12-11 NOTE — PROGRESS NOTES
[facility]  Lung Transplant  Progress Note - Floor    Patient Name: Juanita Ibarra  MRN: [id number]  Admission Date: 12/4/2018  Hospital Length of Stay: 7 days  Post-Operative Day: 1643  Attending Physician: Farncisca Morin DO  Primary Care Provider: Jake Alvarez MD     Subjective:     Interval History: No acute events overnight.  OR bronch scheduled for this afternoon. Patient continues to request IV benadryl, but any further dosing of benadryl should be avoided in patient due to chronic opioid pain regimen. Will plan for discharge tomorrow.  at bedside and updated on plan of care.     Continuous Infusions:  Scheduled Meds:   amLODIPine  10 mg Oral Daily    ARIPiprazole  2 mg Oral Daily    calcium-vitamin D3  1 tablet Oral BID WM    ceFEPime (MAXIPIME) IVPB  1 g Intravenous Q8H    cetirizine  5 mg Oral Daily    dapsone  100 mg Oral Daily    diphenhydrAMINE  50 mg Intravenous BID    enoxaparin  40 mg Subcutaneous Daily    fluconazole  200 mg Oral Daily    fluticasone  1 spray Each Nare Daily    fluticasone-vilanterol  1 puff Inhalation Daily    folic acid  1,000 mcg Oral Daily    gabapentin  300 mg Oral TID    guaiFENesin  600 mg Oral BID    ipratropium  0.5 mg Nebulization Q6H    levalbuterol  1.25 mg Nebulization Q6H    lipase-protease-amylase 24,000-76,000-120,000 units  5 capsule Oral TID WM    magnesium oxide  400 mg Oral BID    metoprolol tartrate  25 mg Oral BID    montelukast  10 mg Oral Daily    morphine  15 mg Oral BID    multivit-min-FA-coenzyme Q10 100-5 mcg-mg  1 tablet Oral Daily    mycophenolate  500 mg Oral BID    pantoprazole  40 mg Oral Daily    polyethylene glycol  17 g Oral BID    predniSONE  5 mg Oral Daily    tacrolimus  2.5 mg Oral BID    topiramate  25 mg Oral BID    venlafaxine  150 mg Oral QHS    venlafaxine  37.5 mg Oral QHS     PRN Meds:acetaminophen, butalbital-acetaminophen-caffeine -40 mg,  diphenhydrAMINE, heparin, porcine (PF), LORazepam, ondansetron, oxyCODONE, promethazine (PHENERGAN) IVPB, tiZANidine    Review of patient's allergies indicates:   Allergen Reactions    Albuterol Palpitations    Colistin Anaphylaxis    Vancomycin analogues      Infusion reaction that does not resolve with slowing    Neupogen [filgrastim] Other (See Comments)     Ostealgia after five daily doses of 300 mcg.      Bactrim [sulfamethoxazole-trimethoprim] Hives    Ceftazidime Hives     Pt stated can tolerate cefapine not ceftazidime    Ceftazidime     Dronabinol Other (See Comments)     Mental changes/hallucinations    Haldol [haloperidol lactate] Other (See Comments)     Seizure like activity    Nsaids (non-steroidal anti-inflammatory drug)      Cannot have due to lung transplant    Adhesive Rash     Cloth tape- please use tegaderm or paper tape    Aztreonam Rash    Ciprofloxacin Nausea And Vomiting     Projectile N/V, per patient.  Unwilling to retry therapy.       Review of Systems   Constitutional: Negative for activity change, appetite change, chills, diaphoresis, fatigue, fever and unexpected weight change.   HENT: Negative for congestion, drooling, ear discharge, ear pain, facial swelling, hearing loss, sore throat and trouble swallowing.    Eyes: Negative for discharge and itching.   Respiratory: Positive for cough (intermittently productive). Negative for apnea, choking, chest tightness, shortness of breath, wheezing and stridor.    Cardiovascular: Positive for chest pain (pleuritic). Negative for palpitations and leg swelling.   Gastrointestinal: Positive for nausea (chronic). Negative for abdominal distention, abdominal pain, blood in stool and constipation.   Endocrine: Negative.    Genitourinary: Negative for difficulty urinating and dysuria.   Musculoskeletal: Negative for arthralgias, back pain, gait problem, joint swelling and myalgias.   Skin: Negative for color change and pallor.    Neurological: Negative for dizziness and headaches.   Hematological: Negative for adenopathy.   Psychiatric/Behavioral: Negative for agitation, behavioral problems and confusion.     Objective:   Physical Exam   Constitutional: She is oriented to person, place, and time. She appears well-developed and well-nourished.   HENT:   Head: Normocephalic and atraumatic.   Mouth/Throat: No oropharyngeal exudate.   Eyes: EOM are normal. Pupils are equal, round, and reactive to light. Right eye exhibits no discharge. No scleral icterus.   Neck: Normal range of motion. No thyromegaly present.   Cardiovascular: Normal rate and regular rhythm. Exam reveals no friction rub.   No murmur heard.  Pulmonary/Chest: Effort normal. No stridor. No respiratory distress. She has no wheezes. She has rhonchi (scant). She has no rales.   Abdominal: Soft. She exhibits no distension. There is no tenderness. There is no guarding.   Musculoskeletal: She exhibits no edema or tenderness.   Neurological: She is alert and oriented to person, place, and time.   Skin: Skin is warm and dry.   Psychiatric: She has a normal mood and affect.         Vital Signs (Most Recent):  Temp: 98.4 °F (36.9 °C) (12/11/18 0915)  Pulse: 87 (12/11/18 1021)  Resp: 18 (12/11/18 1021)  BP: 120/81 (12/11/18 0915)  SpO2: 98 % (12/11/18 1021) Vital Signs (24h Range):  Temp:  [98 °F (36.7 °C)-98.4 °F (36.9 °C)] 98.4 °F (36.9 °C)  Pulse:  [75-98] 87  Resp:  [16-22] 18  SpO2:  [92 %-99 %] 98 %  BP: (108-124)/(65-81) 120/81     Weight: 56.7 kg (125 lb 0 oz)  Body mass index is 24.41 kg/m².      Intake/Output Summary (Last 24 hours) at 12/11/2018 1032  Last data filed at 12/11/2018 0600  Gross per 24 hour   Intake 1000 ml   Output --   Net 1000 ml       Significant Labs:  CBC:  Recent Labs   Lab 12/11/18  0400   WBC 4.93   RBC 2.56*   HGB 7.0*   HCT 24.5*   PLT 94*   MCV 96   MCH 27.3   MCHC 28.6*     BMP:  Recent Labs   Lab 12/11/18  0400      K 4.8      CO2 26   BUN  33*   CREATININE 1.1   CALCIUM 8.5*      Tacrolimus Levels:  Recent Labs   Lab 12/11/18  0400   TACROLIMUS 5.3     Microbiology:  Microbiology Results (last 7 days)     Procedure Component Value Units Date/Time    Blood culture [[id number]] Collected:  12/04/18 2151    Order Status:  Completed Specimen:  Blood from Peripheral, Antecubital, Right Updated:  12/09/18 2312     Blood Culture, Routine No growth after 5 days.    Blood Culture #1 **CANNOT BE ORDERED STAT** [[id number]] Collected:  12/04/18 2120    Order Status:  Completed Specimen:  Blood from Line, Subclavian, Right Updated:  12/09/18 2212     Blood Culture, Routine No growth after 5 days.    Culture, Respiratory with Gram Stain [[id number]]  (Susceptibility) Collected:  12/06/18 0924    Order Status:  Completed Specimen:  Respiratory from Sputum, Expectorated Updated:  12/08/18 1123     Respiratory Culture --     PSEUDOMONAS AERUGINOSA  Rare  Normal respiratory anibal also present       Gram Stain (Respiratory) <10 epithelial cells per low power field.     Gram Stain (Respiratory) Many WBC's     Gram Stain (Respiratory) Moderate Gram positive rods     Gram Stain (Respiratory) Moderate Gram negative rods     Gram Stain (Respiratory) Moderate budding yeast    Respiratory Viral Panel by PCR [facility]; Nasal Swab [[id number]] Collected:  12/05/18 0058    Order Status:  Completed Specimen:  Respiratory Updated:  12/06/18 1156     Respiratory Virus Panel, source Nasal Swab     RVP - Adenovirus Not Detected     Comment: Detects Serotypes B and E. Detection of Serotype C may   be limited. If Adenovirus infection is suspected and a   Not Detected result is returned the sample should be   re-tested for Adenovirus using an independent method  (e.g. [employer] Adenovirus Quantitative Real-Time  PCR test.          Enterovirus Not Detected     Comment: Cross-reactivity has been observed between certain Rhinovirus  strains and the Enterovirus assay.          Human  Bocavirus Not Detected     Human Coronavirus Not Detected     Comment: The Human Coronavirus assay detects Human coronavirus types  229E, OC43,NL63 and HKU1.          RVP - Human Metapneumovirus (hMPV) Not Detected     RVP - Influenza A Not Detected     Influenza A - E8X0-23 Not Detected     RVP - Influenza B Not Detected     Parainfluenza Not Detected     Respiratory Syncytial VirusVirus (RSV) A Not Detected     Comment: The Respiratory Syncytial Viral assay detects types A and B,  however it does not distinguish between the two.          RVP - Rhinovirus Not Detected     Comment: Cross-Reactivity has been observed between certain   Rhinovirus strains and the Enterovirus assay.  Target Enriched Mulitplex Polymerase Chain Reaction (TEM-PCR)  allows for the detection of multiple pathogens out of a single  reaction.  This test was developed and its performance   characteristics determined by [employer].  It has not   been cleared or approved by the U.S.Food and Drug Administration.  Results should be used in conjunction with clinical findings,   and should not form the sole basis for a diagnosis or treatment  decision.  TEM-PCR is a licensed technology of [employer].         Narrative:       Receiving Lab:->[facility]          Microbiology Results (last 7 days)     Procedure Component Value Units Date/Time    Blood culture [[id number]] Collected:  12/04/18 2151    Order Status:  Completed Specimen:  Blood from Peripheral, Antecubital, Right Updated:  12/09/18 2312     Blood Culture, Routine No growth after 5 days.    Blood Culture #1 **CANNOT BE ORDERED STAT** [[id number]] Collected:  12/04/18 2120    Order Status:  Completed Specimen:  Blood from Line, Subclavian, Right Updated:  12/09/18 2212     Blood Culture, Routine No growth after 5 days.    Culture, Respiratory with Gram Stain [[id number]]  (Susceptibility) Collected:  12/06/18 0924    Order Status:  Completed Specimen:  Respiratory from  Sputum, Expectorated Updated:  12/08/18 1123     Respiratory Culture --     PSEUDOMONAS AERUGINOSA  Rare  Normal respiratory ainbal also present       Gram Stain (Respiratory) <10 epithelial cells per low power field.     Gram Stain (Respiratory) Many WBC's     Gram Stain (Respiratory) Moderate Gram positive rods     Gram Stain (Respiratory) Moderate Gram negative rods     Gram Stain (Respiratory) Moderate budding yeast    Respiratory Viral Panel by PCR [facility]; Nasal Swab [[id number]] Collected:  12/05/18 0058    Order Status:  Completed Specimen:  Respiratory Updated:  12/06/18 1156     Respiratory Virus Panel, source Nasal Swab     RVP - Adenovirus Not Detected     Comment: Detects Serotypes B and E. Detection of Serotype C may   be limited. If Adenovirus infection is suspected and a   Not Detected result is returned the sample should be   re-tested for Adenovirus using an independent method  (e.g. [employer] Adenovirus Quantitative Real-Time  PCR test.          Enterovirus Not Detected     Comment: Cross-reactivity has been observed between certain Rhinovirus  strains and the Enterovirus assay.          Human Bocavirus Not Detected     Human Coronavirus Not Detected     Comment: The Human Coronavirus assay detects Human coronavirus types  229E, OC43,NL63 and HKU1.          RVP - Human Metapneumovirus (hMPV) Not Detected     RVP - Influenza A Not Detected     Influenza A - X1G1-55 Not Detected     RVP - Influenza B Not Detected     Parainfluenza Not Detected     Respiratory Syncytial VirusVirus (RSV) A Not Detected     Comment: The Respiratory Syncytial Viral assay detects types A and B,  however it does not distinguish between the two.          RVP - Rhinovirus Not Detected     Comment: Cross-Reactivity has been observed between certain   Rhinovirus strains and the Enterovirus assay.  Target Enriched Mulitplex Polymerase Chain Reaction (TEM-PCR)  allows for the detection of multiple pathogens out of a  single  reaction.  This test was developed and its performance   characteristics determined by [employer].  It has not   been cleared or approved by the U.S.Food and Drug Administration.  Results should be used in conjunction with clinical findings,   and should not form the sole basis for a diagnosis or treatment  decision.  TEM-PCR is a licensed technology of [employer].         Narrative:       Receiving Lab:->[facility]        I have reviewed all pertinent labs within the past 24 hours.        Assessment/Plan:     * Complication of lung transplant    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 Concerns for progression of her CARLOS EDUARDO versus infectious etiology. RVP, sputum culture ordered. Procalcitonin negative. CXR without new infiltrates. Troponin and BNP unremarkable. CT chest 12/5 - GGOs relatively unchanged from prior examination. TTE on 12/5 with EF of 55%, mild left atrial enlargement, PAP 26, mild tricuspid regurg, normal LV/RV function.      Continue immunosuppression and ppx. Continue prn oxygen supplementation to maintain goal O2 sats >88%. RVP negative so Tamiflu stopped.  Culture growing pan-sensitive Pseudomonas, so will continue Cefepime (patient is allergic to Cipro).  Vancomycin stopped on 12/7.  ID consulted, appreciate recs.  Will perform bronchoscopy for cleanout on Tuesday in the OR. Continue atrovent and xopenex nebs.      Will plan for patient to complete 14 day course of cefepime 1 g Q8hrs as outpatient.          Bronchiolitis obliterans syndrome    Patient with persistent decline on outpatient PFTs. Concerns for progression of CARLOS EDUARDO versus infectious etiology. Will continue oxygen supplementation PRN to maintain O2 sats >88% , but continues to feel better, and remains on room air.  Will perform bronchoscopy for cleanout on  Tuesday. Tentatively plan for discharge on Wednesday.     Immunosuppression    Continue prednisone 5 mg daily,  mg BID, and tacrolimus 2.5 mg BID.  Will monitor daily tacrolimus levels and adjust dose as needed.      Prophylactic antibiotic    Continue dapsone 100 mg daily.      Chronic pain with opiate use    Continue current outpatient pain regimen. Will avoid future use of IV Benadryl due to current opioid regimen.     CF related Pancreatic insufficiency    Continue Creon with meals and snacks.      Anxiety disorder    Continue current outpatient regimen with Effexor, Abilify, and Ativan.          Jacqueline Dumont PA-C  Lung Transplant  [facility]

## 2018-12-11 NOTE — ASSESSMENT & PLAN NOTE
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 Concerns for progression of her CARLOS EDUARDO versus infectious etiology. RVP, sputum culture ordered. Procalcitonin negative. CXR without new infiltrates. Troponin and BNP unremarkable. CT chest 12/5 - GGOs relatively unchanged from prior examination. TTE on 12/5 with EF of 55%, mild left atrial enlargement, PAP 26, mild tricuspid regurg, normal LV/RV function.      Continue immunosuppression and ppx. Continue prn oxygen supplementation to maintain goal O2 sats >88%. RVP negative so Tamiflu stopped.  Culture growing pan-sensitive Pseudomonas, so will continue Cefepime (patient is allergic to Cipro).  Vancomycin stopped on 12/7.  ID consulted, appreciate recs.  Will perform bronchoscopy for cleanout on Tuesday in the OR. Continue atrovent and xopenex nebs.      Will plan for patient to complete 14 day course of cefepime 1 g Q8hrs as outpatient.

## 2018-12-11 NOTE — SUBJECTIVE & OBJECTIVE
Subjective:     Interval History: No acute events overnight.  OR bronch scheduled for this afternoon. Patient continues to request IV benadryl, but any further dosing of benadryl should be avoided in patient due to chronic opioid pain regimen. Will plan for discharge tomorrow.  at bedside and updated on plan of care.     Continuous Infusions:  Scheduled Meds:   amLODIPine  10 mg Oral Daily    ARIPiprazole  2 mg Oral Daily    calcium-vitamin D3  1 tablet Oral BID WM    ceFEPime (MAXIPIME) IVPB  1 g Intravenous Q8H    cetirizine  5 mg Oral Daily    dapsone  100 mg Oral Daily    diphenhydrAMINE  50 mg Intravenous BID    enoxaparin  40 mg Subcutaneous Daily    fluconazole  200 mg Oral Daily    fluticasone  1 spray Each Nare Daily    fluticasone-vilanterol  1 puff Inhalation Daily    folic acid  1,000 mcg Oral Daily    gabapentin  300 mg Oral TID    guaiFENesin  600 mg Oral BID    ipratropium  0.5 mg Nebulization Q6H    levalbuterol  1.25 mg Nebulization Q6H    lipase-protease-amylase 24,000-76,000-120,000 units  5 capsule Oral TID WM    magnesium oxide  400 mg Oral BID    metoprolol tartrate  25 mg Oral BID    montelukast  10 mg Oral Daily    morphine  15 mg Oral BID    multivit-min-FA-coenzyme Q10 100-5 mcg-mg  1 tablet Oral Daily    mycophenolate  500 mg Oral BID    pantoprazole  40 mg Oral Daily    polyethylene glycol  17 g Oral BID    predniSONE  5 mg Oral Daily    tacrolimus  2.5 mg Oral BID    topiramate  25 mg Oral BID    venlafaxine  150 mg Oral QHS    venlafaxine  37.5 mg Oral QHS     PRN Meds:acetaminophen, butalbital-acetaminophen-caffeine -40 mg, diphenhydrAMINE, heparin, porcine (PF), LORazepam, ondansetron, oxyCODONE, promethazine (PHENERGAN) IVPB, tiZANidine    Review of patient's allergies indicates:   Allergen Reactions    Albuterol Palpitations    Colistin Anaphylaxis    Vancomycin analogues      Infusion reaction that does not resolve with slowing     Neupogen [filgrastim] Other (See Comments)     Ostealgia after five daily doses of 300 mcg.      Bactrim [sulfamethoxazole-trimethoprim] Hives    Ceftazidime Hives     Pt stated can tolerate cefapine not ceftazidime    Ceftazidime     Dronabinol Other (See Comments)     Mental changes/hallucinations    Haldol [haloperidol lactate] Other (See Comments)     Seizure like activity    Nsaids (non-steroidal anti-inflammatory drug)      Cannot have due to lung transplant    Adhesive Rash     Cloth tape- please use tegaderm or paper tape    Aztreonam Rash    Ciprofloxacin Nausea And Vomiting     Projectile N/V, per patient.  Unwilling to retry therapy.       Review of Systems   Constitutional: Negative for activity change, appetite change, chills, diaphoresis, fatigue, fever and unexpected weight change.   HENT: Negative for congestion, drooling, ear discharge, ear pain, facial swelling, hearing loss, sore throat and trouble swallowing.    Eyes: Negative for discharge and itching.   Respiratory: Positive for cough (intermittently productive). Negative for apnea, choking, chest tightness, shortness of breath, wheezing and stridor.    Cardiovascular: Positive for chest pain (pleuritic). Negative for palpitations and leg swelling.   Gastrointestinal: Positive for nausea (chronic). Negative for abdominal distention, abdominal pain, blood in stool and constipation.   Endocrine: Negative.    Genitourinary: Negative for difficulty urinating and dysuria.   Musculoskeletal: Negative for arthralgias, back pain, gait problem, joint swelling and myalgias.   Skin: Negative for color change and pallor.   Neurological: Negative for dizziness and headaches.   Hematological: Negative for adenopathy.   Psychiatric/Behavioral: Negative for agitation, behavioral problems and confusion.     Objective:   Physical Exam   Constitutional: She is oriented to person, place, and time. She appears well-developed and well-nourished.   HENT:    Head: Normocephalic and atraumatic.   Mouth/Throat: No oropharyngeal exudate.   Eyes: EOM are normal. Pupils are equal, round, and reactive to light. Right eye exhibits no discharge. No scleral icterus.   Neck: Normal range of motion. No thyromegaly present.   Cardiovascular: Normal rate and regular rhythm. Exam reveals no friction rub.   No murmur heard.  Pulmonary/Chest: Effort normal. No stridor. No respiratory distress. She has no wheezes. She has rhonchi (scant). She has no rales.   Abdominal: Soft. She exhibits no distension. There is no tenderness. There is no guarding.   Musculoskeletal: She exhibits no edema or tenderness.   Neurological: She is alert and oriented to person, place, and time.   Skin: Skin is warm and dry.   Psychiatric: She has a normal mood and affect.         Vital Signs (Most Recent):  Temp: 98.4 °F (36.9 °C) (12/11/18 0915)  Pulse: 87 (12/11/18 1021)  Resp: 18 (12/11/18 1021)  BP: 120/81 (12/11/18 0915)  SpO2: 98 % (12/11/18 1021) Vital Signs (24h Range):  Temp:  [98 °F (36.7 °C)-98.4 °F (36.9 °C)] 98.4 °F (36.9 °C)  Pulse:  [75-98] 87  Resp:  [16-22] 18  SpO2:  [92 %-99 %] 98 %  BP: (108-124)/(65-81) 120/81     Weight: 56.7 kg (125 lb 0 oz)  Body mass index is 24.41 kg/m².      Intake/Output Summary (Last 24 hours) at 12/11/2018 1032  Last data filed at 12/11/2018 0600  Gross per 24 hour   Intake 1000 ml   Output --   Net 1000 ml       Significant Labs:  CBC:  Recent Labs   Lab 12/11/18  0400   WBC 4.93   RBC 2.56*   HGB 7.0*   HCT 24.5*   PLT 94*   MCV 96   MCH 27.3   MCHC 28.6*     BMP:  Recent Labs   Lab 12/11/18  0400      K 4.8      CO2 26   BUN 33*   CREATININE 1.1   CALCIUM 8.5*      Tacrolimus Levels:  Recent Labs   Lab 12/11/18  0400   TACROLIMUS 5.3     Microbiology:  Microbiology Results (last 7 days)     Procedure Component Value Units Date/Time    Blood culture [[id number]] Collected:  12/04/18 2151    Order Status:  Completed Specimen:  Blood from Peripheral,  Antecubital, Right Updated:  12/09/18 2312     Blood Culture, Routine No growth after 5 days.    Blood Culture #1 **CANNOT BE ORDERED STAT** [[id number]] Collected:  12/04/18 2120    Order Status:  Completed Specimen:  Blood from Line, Subclavian, Right Updated:  12/09/18 2212     Blood Culture, Routine No growth after 5 days.    Culture, Respiratory with Gram Stain [[id number]]  (Susceptibility) Collected:  12/06/18 0924    Order Status:  Completed Specimen:  Respiratory from Sputum, Expectorated Updated:  12/08/18 1123     Respiratory Culture --     PSEUDOMONAS AERUGINOSA  Rare  Normal respiratory anibal also present       Gram Stain (Respiratory) <10 epithelial cells per low power field.     Gram Stain (Respiratory) Many WBC's     Gram Stain (Respiratory) Moderate Gram positive rods     Gram Stain (Respiratory) Moderate Gram negative rods     Gram Stain (Respiratory) Moderate budding yeast    Respiratory Viral Panel by PCR [facility]; Nasal Swab [[id number]] Collected:  12/05/18 0058    Order Status:  Completed Specimen:  Respiratory Updated:  12/06/18 1156     Respiratory Virus Panel, source Nasal Swab     RVP - Adenovirus Not Detected     Comment: Detects Serotypes B and E. Detection of Serotype C may   be limited. If Adenovirus infection is suspected and a   Not Detected result is returned the sample should be   re-tested for Adenovirus using an independent method  (e.g. [employer] Adenovirus Quantitative Real-Time  PCR test.          Enterovirus Not Detected     Comment: Cross-reactivity has been observed between certain Rhinovirus  strains and the Enterovirus assay.          Human Bocavirus Not Detected     Human Coronavirus Not Detected     Comment: The Human Coronavirus assay detects Human coronavirus types  229E, OC43,NL63 and HKU1.          RVP - Human Metapneumovirus (hMPV) Not Detected     RVP - Influenza A Not Detected     Influenza A - [id number] Not Detected     RVP - Influenza B Not Detected      Parainfluenza Not Detected     Respiratory Syncytial VirusVirus (RSV) A Not Detected     Comment: The Respiratory Syncytial Viral assay detects types A and B,  however it does not distinguish between the two.          RVP - Rhinovirus Not Detected     Comment: Cross-Reactivity has been observed between certain   Rhinovirus strains and the Enterovirus assay.  Target Enriched Mulitplex Polymerase Chain Reaction (TEM-PCR)  allows for the detection of multiple pathogens out of a single  reaction.  This test was developed and its performance   characteristics determined by [employer].  It has not   been cleared or approved by the U.S.Food and Drug Administration.  Results should be used in conjunction with clinical findings,   and should not form the sole basis for a diagnosis or treatment  decision.  TEM-PCR is a licensed technology of [employer].         Narrative:       Receiving Lab:->[facility]          Microbiology Results (last 7 days)     Procedure Component Value Units Date/Time    Blood culture [[id number]] Collected:  12/04/18 2151    Order Status:  Completed Specimen:  Blood from Peripheral, Antecubital, Right Updated:  12/09/18 2312     Blood Culture, Routine No growth after 5 days.    Blood Culture #1 **CANNOT BE ORDERED STAT** [[id number]] Collected:  12/04/18 2120    Order Status:  Completed Specimen:  Blood from Line, Subclavian, Right Updated:  12/09/18 2212     Blood Culture, Routine No growth after 5 days.    Culture, Respiratory with Gram Stain [[id number]]  (Susceptibility) Collected:  12/06/18 0924    Order Status:  Completed Specimen:  Respiratory from Sputum, Expectorated Updated:  12/08/18 1123     Respiratory Culture --     PSEUDOMONAS AERUGINOSA  Rare  Normal respiratory anibal also present       Gram Stain (Respiratory) <10 epithelial cells per low power field.     Gram Stain (Respiratory) Many WBC's     Gram Stain (Respiratory) Moderate Gram positive rods     Gram Stain  (Respiratory) Moderate Gram negative rods     Gram Stain (Respiratory) Moderate budding yeast    Respiratory Viral Panel by PCR [facility]; Nasal Swab [[id number]] Collected:  12/05/18 0058    Order Status:  Completed Specimen:  Respiratory Updated:  12/06/18 1156     Respiratory Virus Panel, source Nasal Swab     RVP - Adenovirus Not Detected     Comment: Detects Serotypes B and E. Detection of Serotype C may   be limited. If Adenovirus infection is suspected and a   Not Detected result is returned the sample should be   re-tested for Adenovirus using an independent method  (e.g. [employer] Adenovirus Quantitative Real-Time  PCR test.          Enterovirus Not Detected     Comment: Cross-reactivity has been observed between certain Rhinovirus  strains and the Enterovirus assay.          Human Bocavirus Not Detected     Human Coronavirus Not Detected     Comment: The Human Coronavirus assay detects Human coronavirus types  229E, OC43,NL63 and HKU1.          RVP - Human Metapneumovirus (hMPV) Not Detected     RVP - Influenza A Not Detected     Influenza A - [id number] Not Detected     RVP - Influenza B Not Detected     Parainfluenza Not Detected     Respiratory Syncytial VirusVirus (RSV) A Not Detected     Comment: The Respiratory Syncytial Viral assay detects types A and B,  however it does not distinguish between the two.          RVP - Rhinovirus Not Detected     Comment: Cross-Reactivity has been observed between certain   Rhinovirus strains and the Enterovirus assay.  Target Enriched Mulitplex Polymerase Chain Reaction (TEM-PCR)  allows for the detection of multiple pathogens out of a single  reaction.  This test was developed and its performance   characteristics determined by [employer].  It has not   been cleared or approved by the U.S.Food and Drug Administration.  Results should be used in conjunction with clinical findings,   and should not form the sole basis for a diagnosis or  treatment  decision.  TEM-PCR is a licensed technology of [employer].         Narrative:       Receiving Lab:->[facility]        I have reviewed all pertinent labs within the past 24 hours.

## 2018-12-11 NOTE — NURSING TRANSFER
Nursing Transfer Note      12/11/2018     Transfer To: [id number]    Transfer via stretcher    Transfer with cardiac monitoring via tele    Transported by pct    Medicines sent: none    Chart send with patient: Yes    Notified: spouse in room via nurse

## 2018-12-11 NOTE — ASSESSMENT & PLAN NOTE
Continue current outpatient pain regimen. Will avoid future use of IV Benadryl due to current opioid regimen.

## 2018-12-11 NOTE — PLAN OF CARE
"Problem: Patient Care Overview  Goal: Plan of Care Review  Outcome: Ongoing (interventions implemented as appropriate)  Bronch done today - patient now on RA, states she feels like she is breathing better but reports "wheezing a lot." Seen by FELICIA Way; IS ordered Q2H, also called for respiratory treatment to be given early. IV Cefepime continued Q8H. Patient to be d/c'd home tomorrow with home health.  at bedside.       "

## 2018-12-11 NOTE — ASSESSMENT & PLAN NOTE
Patient with persistent decline on outpatient PFTs. Concerns for progression of CARLOS EDUARDO versus infectious etiology. Will continue oxygen supplementation PRN to maintain O2 sats >88% , but continues to feel better, and remains on room air.  Will perform bronchoscopy for cleanout on Tuesday. Tentatively plan for discharge on Wednesday.

## 2018-12-12 NOTE — PLAN OF CARE
Problem: Patient Care Overview  Goal: Plan of Care Review  Outcome: Ongoing (interventions implemented as appropriate)  Pt is AAOx4 in bed wearing non-skid footwear, bed in low/locked position and with call bell within reach. Pt reminded to use call bell to call for assistance, pt verbalizes understanding.  at bedside. Pt is afebrile at this time. Proper hand hygiene performed before and after pt care activities. PRN pain medication administered during night. Patient resting comfortably at this time.

## 2018-12-12 NOTE — PROGRESS NOTES
Patient being discharged home today.  Discharge instructions and the outpatient plan of care as determined by Dr. Morin were reviewed with the patient and her  as follows:  1.  Treatment for Pseudomonas lung infection:  Cefepime 1 gram IV via port-a-cath every 8 hours through December 19, 2018.  Patient will self-infuse the IV medication and perform her own port-a-cath care.  Patient already established with [facility] for home infusion services and she will continue using this provider.  2.  Return for a chest x-ray, labs, spirometry and doctor visit on Wednesday, January 9, 2018 as previously scheduled.    Per patient's request, prescriptions for levalbuterol nebs and hypersal nebs will be sent to [facility] outpatient pharmacy, and refills for ondansetron and promethazine will be sent to the [facility] on [address] [address] [facility] in [place].  Order for a new nebulizer kit will be submitted to [facility] clearinghouse for processing.  The patient and her  asked questions, which were answered to their satisfaction.  Both verbalized their understanding of all information discussed and demonstrated their readiness for discharge.

## 2018-12-12 NOTE — DISCHARGE SUMMARY
[facility]  Lung Transplant  Discharge Summary      Patient Name: Juanita Ibarra  MRN: [id number]  Admission Date: 12/4/2018  Hospital Length of Stay: 8 days  Discharge Date and Time: 12/12/2018 11:59 AM  Attending Physician: Francisca Morin DO   Discharging Provider: Reyes Will NP  Primary Care Provider: Jake Alvarez MD     HPI: 27 YO female with history of BLT secondary to CF. 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  Patient presents with chest congestion and reported hypoxia since Friday (11/30).  Patient reports she developed a dry cough and chest congestion on the Friday prior to admit that has slowly been progressing.  No significant sputum production.  No fever, chills at home.  No sick contacts.  Reported 1 day of sore throat but no myalgias, diarrhea.  Felt like she had a rattling in her chest.  Has a nebulizer at home but no neb medicine.  Started taking guaifenesin yesterday without meaningful improvement in sx.  Has chronic nausea that is no worse than baseline.   She developed some substernal, dull, achy chest pain without radiation yesterday.  Feels like it is related to her cough and it is aggravated by coughing.  She started wearing her oxygen again yesterday (had not been wearing it for several weeks - she wore it briefly nocturnally since her recent discharge on 11/14).  She came in on 2L and was increased to 4L in triage.  Weaned back down to 2L while speaking with her and tolerated it well.  Of note, she was admitted from 11/7 to 11/14 of this year w/ renal failure, AMS thought to be secondary to narcotics and possible meningitis.  She is presently at baseline mental status.  Denies any meaningful LE edema.  Reports urine is pale, normal frequency.  No hematemesis, hematochezia, melena.  Reports compliance with her Tacrolimus  1.5mg BID, Prednisone 5mg, Cellcept 500mg BID.        Procedure(s) (LRB):  BRONCHOSCOPY (Bilateral)     Hospital Course:   Complication of lung transplant     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 Concerns for progression of her CARLOS EDUARDO versus infectious etiology. RVP, sputum culture ordered. Procalcitonin negative. CXR without new infiltrates. Troponin and BNP unremarkable. CT chest 12/5 - GGOs relatively unchanged from prior examination. TTE on 12/5 with EF of 55%, mild left atrial enlargement, PAP 26, mild tricuspid regurg, normal LV/RV function.      Continue immunosuppression and ppx. Continue prn oxygen supplementation to maintain goal O2 sats >88%. RVP negative so Tamiflu stopped.  Culture growing pan-sensitive Pseudomonas, so will continue Cefepime (patient is allergic to Cipro).  Vancomycin stopped on 12/7.  ID consulted, appreciate recs.  Bronchoscopy for cleanout on Tuesday with secretions removed and BAL sent.  Continue xopenex nebs.       Will plan for patient to complete 14 day course of cefepime 1 g Q8hrs as outpatient.          Bronchiolitis obliterans syndrome     Patient with persistent decline on outpatient PFTs. Concerns for progression of CARLOS EDUARDO versus infectious etiology. Will continue oxygen supplementation PRN to maintain O2 sats >88% , but continues to feel better, and remains on room air.        Immunosuppression     Continue prednisone 5 mg daily,  mg BID, and tacrolimus 2.5 mg BID.  Will monitor daily tacrolimus levels and adjust dose as needed.       Prophylactic antibiotic     Continue dapsone 100 mg daily.       Chronic pain with opiate use     Continue current outpatient pain regimen. Will avoid future use of IV Benadryl due to current opioid regimen.      CF related Pancreatic insufficiency     Continue Creon with meals and snacks.        Anxiety disorder     Continue current outpatient regimen with Effexor, Abilify, and Ativan.  Suggested that patient establish herself with a mental health provider.              Consults (From admission, onward)        Status Ordering Provider     Inpatient consult to Hepatology  Once     Provider:  (Not yet assigned)    Completed DUSTY EDWARD     Inpatient consult to Infectious Diseases  Once     Provider:  (Not yet assigned)    Completed SARIKA SHERMAN     Inpatient consult to Lung Transplant  Once     Provider:  (Not yet assigned)    Completed KEVIN LOCKE          Significant Diagnostic Studies: Labs:   BMP:   Recent Labs   Lab 12/11/18  0400 12/12/18  0353   GLU 96 251*    135*   K 4.8 4.8    105   CO2 26 24   BUN 33* 30*   CREATININE 1.1 1.3   CALCIUM 8.5* 8.8   , CMP   Recent Labs   Lab 12/11/18  0400 12/12/18  0353    135*   K 4.8 4.8    105   CO2 26 24   GLU 96 251*   BUN 33* 30*   CREATININE 1.1 1.3   CALCIUM 8.5* 8.8   PROT 6.5 7.4   ALBUMIN 3.3* 3.7   BILITOT 0.2 0.2   ALKPHOS 147* 155*   AST 25 21   ALT 28 29   ANIONGAP 6* 6*   ESTGFRAFRICA >60.0 >60.0   EGFRNONAA >60.0 55.6*    and CBC   Recent Labs   Lab 12/11/18  0400 12/12/18  0353   WBC 4.93 3.93   HGB 7.0* 7.5*   HCT 24.5* 25.7*   PLT 94* 101*     Microbiology:   Sputum Culture   Lab Results   Component Value Date    GSRESP No WBC's 12/11/2018    GSRESP No organisms seen 12/11/2018    RESPIRATORYC No Growth 12/11/2018     Radiology: X-Ray: CXR: X-Ray Chest 1 View (CXR): No results found for this visit on 12/04/18. and X-Ray Chest PA and Lateral (CXR): No results found for this visit on 12/04/18.    Pending Diagnostic Studies:     Procedure Component Value Units Date/Time    CBC auto differential [[id number]] Collected:  12/08/18 0600    Order Status:  Sent Lab Status:  No result     Specimen:  Blood     Comprehensive metabolic panel [[id number]] Collected:  12/08/18 0600    Order Status:  Sent Lab Status:   No result     Specimen:  Blood     Tacrolimus level [[id number]] Collected:  12/08/18 0600    Order Status:  Sent Lab Status:  No result     Specimen:  Blood         Final Active Diagnoses:    Diagnosis Date Noted POA    PRINCIPAL PROBLEM:  Complication of lung transplant [T86.819] 06/14/2014 Yes    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] 12/19/2016 Yes    Immunosuppression [D89.9] 06/12/2014 Yes    Prophylactic antibiotic [Z79.2] 06/30/2014 Not Applicable    Anxiety disorder [F41.9] 10/08/2013 Yes    CF related Pancreatic insufficiency [K86.89] 12/20/2013 Yes    Chronic pain with opiate use [G89.29] 12/20/2013 Yes    Splenomegaly [R16.1]  Yes    Thrombocytopenia [D69.6] 12/04/2018 Yes    SOB (shortness of breath) [R06.02] 12/04/2018 Yes    LRTI (lower respiratory tract infection) [J22] 02/02/2015 Yes      Problems Resolved During this Admission:    Diagnosis Date Noted Date Resolved POA    Transaminitis [R74.0] 03/28/2014 12/09/2018 Yes    Acute kidney failure [N17.9] 11/07/2018 12/05/2018 Yes      Discharged Condition: stable    Disposition: Home or Self Care    Medications:  Reconciled Home Medications:      Medication List      START taking these medications    guaiFENesin 600 mg 12 hr tablet  Commonly known as:  MUCINEX  Take 1 tablet (600 mg total) by mouth 2 (two) times daily.     levalbuterol 1.25 mg/3 mL nebulizer solution  Commonly known as:  XOPENEX  Take 3 mLs (1.25 mg total) by nebulization every 8 (eight) hours as needed for Wheezing or Shortness of Breath. Rescue        CHANGE how you take these medications    lipase-protease-amylase 36,000-114,000- 180,000 unit Cpdr  Commonly known as:  CREON  Take 4 capsules by mouth 3 (three) times daily with meals. Take 3 with snacks prn.  What changed:    · how much to take  · additional instructions     ondansetron 8 MG tablet  Commonly known as:  ZOFRAN  Take 1 tablet (8 mg total) by mouth every 12 (twelve) hours as needed for Nausea.  What changed:   See the new instructions.     promethazine 25 MG tablet  Commonly known as:  PHENERGAN  Take 1 tablet (25 mg total) by mouth every 6 (six) hours as needed for Nausea.  What changed:  See the new instructions.     * tacrolimus 0.5 MG Cap  Commonly known as:  PROGRAF  Take 1 capsule (0.5 mg total) by mouth once daily.  What changed:  Another medication with the same name was changed. Make sure you understand how and when to take each.     * tacrolimus 1 MG Cap  Commonly known as:  PROGRAF  Take 2 capsules (2 mg total) by mouth every 12 (twelve) hours. Daily doses: 2.5 mg BID  What changed:    · how much to take  · how to take this  · when to take this  · additional instructions     venlafaxine 75 MG 24 hr capsule  Commonly known as:  EFFEXOR-XR  Take 1 capsule (75 mg total) by mouth every evening.  What changed:  how much to take         * This list has 2 medication(s) that are the same as other medications prescribed for you. Read the directions carefully, and ask your doctor or other care provider to review them with you.            CONTINUE taking these medications    amLODIPine 10 MG tablet  Commonly known as:  NORVASC  Take 1 tablet (10 mg total) by mouth once daily.     ARIPiprazole 2 MG Tab  Commonly known as:  ABILIFY  Take 2 mg by mouth once daily.     calcium-vitamin D3 500 mg(1,250mg) -200 unit per tablet  Commonly known as:  OS-KATY 500 + D3  Take 1 tablet by mouth 2 (two) times daily with meals.     dapsone 100 MG Tab  Take 1 tablet (100 mg total) by mouth once daily.     diphenhydrAMINE 50 MG tablet  Commonly known as:  BENADRYL  Take 1 tablet (50 mg total) by mouth every 6 (six) hours as needed for Itching.     DULERA 100-5 mcg/actuation Hfaa  Generic drug:  mometasone-formoterol  INHALE 1 PUFF BY MOUTH TWICE DAILY     fexofenadine 180 MG tablet  Commonly known as:  ALLEGRA  Take 180 mg by mouth once daily.     fluconazole 150 MG Tab  Commonly known as:  DIFLUCAN  TAKE 1 TABLET BY MOUTH EVERY WEEK      fluticasone 50 mcg/actuation nasal spray  Commonly known as:  FLONASE  INSERT 2 SPRAYS IN EACH NOSTRIL DAILY     folic acid 1 MG tablet  Commonly known as:  FOLVITE  Take 1 tablet (1,000 mcg total) by mouth once daily.     gabapentin 300 MG capsule  Commonly known as:  NEURONTIN  Take 1 capsule (300 mg total) by mouth 3 (three) times daily.     inhalation spacing device  Commonly known as:  PROCHAMBER  USE AS DIRECTED     LORazepam 1 MG tablet  Commonly known as:  ATIVAN  Take 2 mg by mouth every 6 (six) hours as needed for Anxiety.     magnesium oxide 400 mg (241.3 mg magnesium) tablet  Commonly known as:  MAG-OX  Take 1 tablet (400 mg total) by mouth 2 (two) times daily.     metoprolol tartrate 25 MG tablet  Commonly known as:  LOPRESSOR  Take 1 tablet (25 mg total) by mouth 2 (two) times daily.     montelukast 10 mg tablet  Commonly known as:  SINGULAIR  Take 1 tablet (10 mg total) by mouth once daily.     morphine 15 MG 12 hr tablet  Commonly known as:  MS CONTIN  Take 15 mg by mouth 2 (two) times daily.     multivit,min52-folic-vitK-cQ10 100-700-10 mcg-mcg-mg Cap cap  Commonly known as:  AQUADEKS  1 tab twice daily     mycophenolate 250 mg Cap  Commonly known as:  CELLCEPT  Take 2 capsules (500 mg total) by mouth 2 (two) times daily.     nystatin 100,000 unit/mL suspension  Commonly known as:  MYCOSTATIN  Take 5 mLs (500,000 Units total) by mouth 3 (three) times daily. for 10 days     omeprazole 40 MG capsule  Commonly known as:  PRILOSEC  Take 1 capsule (40 mg total) by mouth every morning.     oxyCODONE 5 MG immediate release tablet  Commonly known as:  ROXICODONE  Take 10 mg by mouth every 4 (four) hours as needed for Pain.     polyethylene glycol 17 gram Pwpk  Commonly known as:  GLYCOLAX  Take 17 g by mouth 2 (two) times daily.     predniSONE 5 MG tablet  Commonly known as:  DELTASONE  Take 1 tablet (5 mg total) by mouth once daily.     tiZANidine 4 MG tablet  Commonly known as:  ZANAFLEX  TAKE 2 TABLETS  BY MOUTH EVERY 6 HOURS AS NEEDED     topiramate 25 MG tablet  Commonly known as:  TOPAMAX  Take 1 tablet (25 mg total) by mouth 2 (two) times daily.        \  Patient Instructions:      NEBULIZER KIT (SUPPLIES) FOR HOME USE     Order Specific Question Answer Comments   Height: 5' (1.524 m)    Weight: 56 kg (123 lb 7.3 oz)    Does patient have medical equipment at home? none    Length of need (1-99 months): 99    Mask or Mouthpiece? Mouthpiece      Ambulatory Referral to Infusion Dept   Referral Priority: Routine Referral Type: Consultation   Referral Reason: Specialty Services Required   Requested Specialty: Infusion Provider   Number of Visits Requested: 1     Diet Adult Regular     Notify your health care provider if you experience any of the following:  increased confusion or weakness     Notify your health care provider if you experience any of the following:  persistent dizziness, light-headedness, or visual disturbances     Notify your health care provider if you experience any of the following:  worsening rash     Notify your health care provider if you experience any of the following:  severe persistent headache     Notify your health care provider if you experience any of the following:  difficulty breathing or increased cough     Notify your health care provider if you experience any of the following:  redness, tenderness, or signs of infection (pain, swelling, redness, odor or green/yellow discharge around incision site)     Notify your health care provider if you experience any of the following:  severe uncontrolled pain     Notify your health care provider if you experience any of the following:  persistent nausea and vomiting or diarrhea     Notify your health care provider if you experience any of the following:  temperature >100.4     Activity as tolerated     Follow Up:  Follow-up Information     Francisca Morin DO In 1 month.    Specialties:  Pulmonary Disease, Critical Care Medicine  Contact  information:  [address]  [facility] [address]  [phone]                   Reyes Will NP  Lung Transplant  [facility]

## 2018-12-14 NOTE — TELEPHONE ENCOUNTER
----- Message from Saud Segura sent at 12/14/2018  1:00 PM CST -----  Contact: Patient  Needs Advice    Reason for call: Calling to speak with coordinator and need a PA for levalbuterol (XOPENEX) 1.25 mg/3 mL nebulizer solution        Communication Preference: [phone]    Additional Information: N/A

## 2018-12-14 NOTE — TELEPHONE ENCOUNTER
Returned call to patient who asked for a PA for Xopenex.  Informed patient that a PA will take a few days.  Xopenex will be completed by Yasmani Rain PharmD.  I let her know that we will contact her once the Xopenex is approved.     Patient also asked for heparin flushes (yellow).  She said that [facility] sent her the blue ones and that is not correct.  Instructed patient to contact [facility] regarding the flushes.

## 2018-12-17 NOTE — PHYSICIAN QUERY
PT Name: Juanita Ibarra  MR #: [id number]    Physician Query Form - Hematology Clarification      CDS/: Krupa Fish               Contact information:  [email or web address]     This form is a permanent document in the medical record.      Query Date: December 17, 2018    By submitting this query, we are merely seeking further clarification of documentation. Please utilize your independent clinical judgment when addressing the question(s) below.    The Medical record contains the following:   Indicators    Supporting Clinical Findings Location in Medical Record   X Anemia, Thrombocytopenia, Neutropenia, Pancytopenia documented Pancytopenia  ID Consult 12/6   X H & H Hemoglobin 7.4-6.7-7.5  Hematocrit 25.3-23-26.3   Labs 12/6-12/9   X WBC WBC 2.63- 3.88 Labs 12/6-12/9    Neutrophils      Granulocytes     X Platelets Platelets 82-75-90 Labs 12/6-12/9    Transfusion(s)      Treatments:      Other: CF related Pancreatic insufficiency,    Lung transplant status, bilateral DC Summary     DC Summary      Provider, please specify diagnosis or diagnoses associated with above clinical findings.    [   ] Pancytopenia with aplastic anemia   [ x  ] Pancytopenia due to other drug   [   ] Pancytopenia due to myelodysplastic syndrome   [   ] Pancytopenia   [   ] Other Hematological Diagnosis (please specify)   [  ] Clinically Undetermined         Please document in your progress notes daily for the duration of treatment, until resolved, and include in your discharge summary.

## 2018-12-17 NOTE — TELEPHONE ENCOUNTER
----- Message from Valeria Banks sent at 12/17/2018 12:07 PM CST -----  Contact: PATIENT   Needs Advice    Reason for call: PATIENT WOULD LIKE TO SPEAK WITH COORDINATOR. SHE STATES SHE BEEN ON ANTIBIOTICS FOR TEN DAYS AND STILL NOT FEELING BETTER.        Communication Preference: [phone]    Additional Information: N/A

## 2018-12-17 NOTE — TELEPHONE ENCOUNTER
Returned call to patient.  Patient states that she is still not feeling well despite being on antibiotics.  She is still producing yellow sputum with her cough.  Informed patient that I will contact Dr. Morin and get back with her.      Notified Dr. Morin of the above complaint.  Received written orders from Dr. Morin to extend IV Cefepime for an additional 7 days.      Contacted patient to inform her that we will extend the IV Cefepime for an additional 7 days.  Orders entered and submitted for the extension.  Patient thanked us and did not have any further questions at this time.

## 2018-12-20 NOTE — TELEPHONE ENCOUNTER
Called patient this morning to let her know that the Xopenex has been approved and she can pick it up from her local pharmacy.    Discussed with patient that Dr. Alvarez would like to start the process of having her evaluated for a re-transplant.  Patient states that she had a feeling it was coming and would like to get started.  Patient did not have any questions at this time.  Asked patient to call us if she had any questions or concerns after this call.  Patient verbalized understanding.

## 2018-12-21 NOTE — TELEPHONE ENCOUNTER
----- Message from Saud Segura sent at 12/21/2018  2:36 PM CST -----  Contact: Patient  Rx Refill/Request     Is this a Refill or New Rx: Refill      Rx Name and Strength: mycophenolate (CELLCEPT) 250 mg Cap and metoprolol tartrate (LOPRESSOR) 25 MG tablet     Preferred Pharmacy with phone number: [facility] [address], [place] [address]    PH: [phone]    Communication Preference: Pt: [phone]    Additional Information: Pt states she need the refill by Tue 12/25

## 2018-12-22 NOTE — PROGRESS NOTES
Orders received from Dr. Alvarez to proceed with evaluation for re-transplantation.  Financial clearance received.

## 2018-12-27 NOTE — TELEPHONE ENCOUNTER
Reviewed lung re-transplant eval appointments with patient.  Explained that her  should be with her for education and to meet with the SW on 1/9. She states he will try to be with her for all of the days. Explained that appointment information will be mailed to her.  She verbalized understanding.    ----- Message from Sanaz Raya sent at 12/27/2018  3:10 PM CST -----  Contact: Patient  Needs Advice    Reason for call: pt has concerns about her upcoming appts and wishes to speak to Enedina.        Communication Preference: [phone]    Additional Information: n/a

## 2019-01-01 ENCOUNTER — TELEPHONE (OUTPATIENT)
Dept: PHARMACY | Facility: CLINIC | Age: 29
End: 2019-01-01

## 2019-01-01 ENCOUNTER — SOCIAL WORK (OUTPATIENT)
Dept: TRANSPLANT | Facility: CLINIC | Age: 29
End: 2019-01-01
Payer: COMMERCIAL

## 2019-01-01 ENCOUNTER — TELEPHONE (OUTPATIENT)
Dept: TRANSPLANT | Facility: CLINIC | Age: 29
End: 2019-01-01

## 2019-01-01 ENCOUNTER — OFFICE VISIT (OUTPATIENT)
Dept: TRANSPLANT | Facility: CLINIC | Age: 29
End: 2019-01-01
Payer: COMMERCIAL

## 2019-01-01 ENCOUNTER — LAB VISIT (OUTPATIENT)
Dept: LAB | Facility: HOSPITAL | Age: 29
End: 2019-01-01
Attending: INTERNAL MEDICINE
Payer: MEDICAID

## 2019-01-01 ENCOUNTER — PATIENT MESSAGE (OUTPATIENT)
Dept: TRANSPLANT | Facility: HOSPITAL | Age: 29
End: 2019-01-01

## 2019-01-01 ENCOUNTER — PATIENT MESSAGE (OUTPATIENT)
Dept: OTOLARYNGOLOGY | Facility: CLINIC | Age: 29
End: 2019-01-01

## 2019-01-01 ENCOUNTER — PATIENT MESSAGE (OUTPATIENT)
Dept: TRANSPLANT | Facility: CLINIC | Age: 29
End: 2019-01-01

## 2019-01-01 ENCOUNTER — ANESTHESIA (OUTPATIENT)
Dept: SURGERY | Facility: HOSPITAL | Age: 29
DRG: 135 | End: 2019-01-01
Payer: COMMERCIAL

## 2019-01-01 ENCOUNTER — OFFICE VISIT (OUTPATIENT)
Dept: CARDIOTHORACIC SURGERY | Facility: CLINIC | Age: 29
End: 2019-01-01
Payer: MEDICAID

## 2019-01-01 ENCOUNTER — OFFICE VISIT (OUTPATIENT)
Dept: TRANSPLANT | Facility: CLINIC | Age: 29
End: 2019-01-01
Payer: MEDICAID

## 2019-01-01 ENCOUNTER — DOCUMENTATION ONLY (OUTPATIENT)
Dept: TRANSPLANT | Facility: HOSPITAL | Age: 29
End: 2019-01-01

## 2019-01-01 ENCOUNTER — HOSPITAL ENCOUNTER (OUTPATIENT)
Dept: PULMONOLOGY | Facility: CLINIC | Age: 29
Discharge: HOME | End: 2019-01-22
Payer: COMMERCIAL

## 2019-01-01 ENCOUNTER — OFFICE VISIT (OUTPATIENT)
Dept: HEPATOLOGY | Facility: CLINIC | Age: 29
End: 2019-01-01
Payer: MEDICAID

## 2019-01-01 ENCOUNTER — TELEPHONE (OUTPATIENT)
Dept: TRANSPLANT | Facility: HOSPITAL | Age: 29
End: 2019-01-01

## 2019-01-01 ENCOUNTER — TELEPHONE (OUTPATIENT)
Dept: ENDOSCOPY | Facility: HOSPITAL | Age: 29
End: 2019-01-01

## 2019-01-01 ENCOUNTER — EDUCATION (OUTPATIENT)
Dept: TRANSPLANT | Facility: CLINIC | Age: 29
End: 2019-01-01

## 2019-01-01 ENCOUNTER — ANESTHESIA EVENT (OUTPATIENT)
Dept: SURGERY | Facility: HOSPITAL | Age: 29
DRG: 206 | End: 2019-01-01
Payer: MEDICAID

## 2019-01-01 ENCOUNTER — HOSPITAL ENCOUNTER (INPATIENT)
Facility: HOSPITAL | Age: 29
LOS: 9 days | DRG: 166 | End: 2019-09-04
Attending: EMERGENCY MEDICINE | Admitting: INTERNAL MEDICINE
Payer: MEDICAID

## 2019-01-01 ENCOUNTER — COMMITTEE REVIEW (OUTPATIENT)
Dept: TRANSPLANT | Facility: CLINIC | Age: 29
End: 2019-01-01

## 2019-01-01 ENCOUNTER — INITIAL CONSULT (OUTPATIENT)
Dept: PSYCHIATRY | Facility: CLINIC | Age: 29
End: 2019-01-01
Payer: COMMERCIAL

## 2019-01-01 ENCOUNTER — TELEPHONE (OUTPATIENT)
Dept: HEPATOLOGY | Facility: CLINIC | Age: 29
End: 2019-01-01

## 2019-01-01 ENCOUNTER — HOSPITAL ENCOUNTER (OUTPATIENT)
Dept: PULMONOLOGY | Facility: CLINIC | Age: 29
Discharge: HOME | End: 2019-01-09
Payer: COMMERCIAL

## 2019-01-01 ENCOUNTER — PATIENT MESSAGE (OUTPATIENT)
Dept: ENDOCRINOLOGY | Facility: CLINIC | Age: 29
End: 2019-01-01

## 2019-01-01 ENCOUNTER — OFFICE VISIT (OUTPATIENT)
Dept: PSYCHIATRY | Facility: CLINIC | Age: 29
End: 2019-01-01
Payer: COMMERCIAL

## 2019-01-01 ENCOUNTER — ANESTHESIA EVENT (OUTPATIENT)
Dept: SURGERY | Facility: HOSPITAL | Age: 29
DRG: 007 | End: 2019-01-01
Payer: MEDICAID

## 2019-01-01 ENCOUNTER — LAB VISIT (OUTPATIENT)
Dept: LAB | Facility: HOSPITAL | Age: 29
End: 2019-01-01
Attending: INTERNAL MEDICINE
Payer: COMMERCIAL

## 2019-01-01 ENCOUNTER — HOSPITAL ENCOUNTER (OUTPATIENT)
Dept: RADIOLOGY | Facility: HOSPITAL | Age: 29
Discharge: HOME | End: 2019-05-01
Attending: INTERNAL MEDICINE
Payer: MEDICAID

## 2019-01-01 ENCOUNTER — HOSPITAL ENCOUNTER (OUTPATIENT)
Facility: HOSPITAL | Age: 29
Discharge: HOME | End: 2019-05-03
Attending: INTERNAL MEDICINE | Admitting: INTERNAL MEDICINE
Payer: COMMERCIAL

## 2019-01-01 ENCOUNTER — DOCUMENTATION ONLY (OUTPATIENT)
Dept: TRANSPLANT | Facility: CLINIC | Age: 29
End: 2019-01-01

## 2019-01-01 ENCOUNTER — DOCUMENTATION ONLY (OUTPATIENT)
Dept: PHARMACY | Facility: HOSPITAL | Age: 29
End: 2019-01-01

## 2019-01-01 ENCOUNTER — HOSPITAL ENCOUNTER (OUTPATIENT)
Dept: PULMONOLOGY | Facility: HOSPITAL | Age: 29
Discharge: HOME | End: 2019-07-19
Attending: INTERNAL MEDICINE
Payer: MEDICAID

## 2019-01-01 ENCOUNTER — HOSPITAL ENCOUNTER (OUTPATIENT)
Dept: RADIOLOGY | Facility: HOSPITAL | Age: 29
Discharge: HOME | End: 2019-01-23
Attending: INTERNAL MEDICINE
Payer: COMMERCIAL

## 2019-01-01 ENCOUNTER — HOSPITAL ENCOUNTER (OUTPATIENT)
Dept: PULMONOLOGY | Facility: HOSPITAL | Age: 29
Discharge: HOME | End: 2019-05-01
Attending: INTERNAL MEDICINE
Payer: COMMERCIAL

## 2019-01-01 ENCOUNTER — CLINICAL SUPPORT (OUTPATIENT)
Dept: TRANSPLANT | Facility: CLINIC | Age: 29
End: 2019-01-01
Payer: COMMERCIAL

## 2019-01-01 ENCOUNTER — HOSPITAL ENCOUNTER (OUTPATIENT)
Facility: HOSPITAL | Age: 29
Discharge: HOME | End: 2019-07-26
Attending: INTERNAL MEDICINE | Admitting: INTERNAL MEDICINE
Payer: MEDICAID

## 2019-01-01 ENCOUNTER — POST MORTEM DOCUMENTATION (OUTPATIENT)
Dept: TRANSPLANT | Facility: CLINIC | Age: 29
End: 2019-01-01

## 2019-01-01 ENCOUNTER — OFFICE VISIT (OUTPATIENT)
Dept: INFECTIOUS DISEASES | Facility: CLINIC | Age: 29
End: 2019-01-01
Payer: COMMERCIAL

## 2019-01-01 ENCOUNTER — OFFICE VISIT (OUTPATIENT)
Dept: PSYCHIATRY | Facility: CLINIC | Age: 29
End: 2019-01-01
Payer: MEDICAID

## 2019-01-01 ENCOUNTER — ANESTHESIA (OUTPATIENT)
Dept: INTENSIVE CARE | Facility: HOSPITAL | Age: 29
DRG: 007 | End: 2019-01-01
Payer: COMMERCIAL

## 2019-01-01 ENCOUNTER — ANESTHESIA (OUTPATIENT)
Dept: SURGERY | Facility: HOSPITAL | Age: 29
DRG: 206 | End: 2019-01-01
Payer: MEDICAID

## 2019-01-01 ENCOUNTER — ANESTHESIA EVENT (OUTPATIENT)
Dept: INTENSIVE CARE | Facility: HOSPITAL | Age: 29
DRG: 007 | End: 2019-01-01
Payer: MEDICAID

## 2019-01-01 ENCOUNTER — ANESTHESIA EVENT (OUTPATIENT)
Dept: ENDOSCOPY | Facility: HOSPITAL | Age: 29
End: 2019-01-01
Payer: COMMERCIAL

## 2019-01-01 ENCOUNTER — TELEPHONE (OUTPATIENT)
Dept: OTOLARYNGOLOGY | Facility: CLINIC | Age: 29
End: 2019-01-01

## 2019-01-01 ENCOUNTER — HOSPITAL ENCOUNTER (OUTPATIENT)
Dept: RADIOLOGY | Facility: HOSPITAL | Age: 29
Discharge: HOME | End: 2019-06-06
Attending: INTERNAL MEDICINE
Payer: MEDICAID

## 2019-01-01 ENCOUNTER — HOSPITAL ENCOUNTER (INPATIENT)
Facility: HOSPITAL | Age: 29
LOS: 10 days | Discharge: HOME | DRG: 135 | End: 2019-04-15
Attending: EMERGENCY MEDICINE | Admitting: INTERNAL MEDICINE
Payer: COMMERCIAL

## 2019-01-01 ENCOUNTER — OFFICE VISIT (OUTPATIENT)
Dept: TRANSPLANT | Facility: CLINIC | Age: 29
End: 2019-01-01
Attending: INTERNAL MEDICINE
Payer: COMMERCIAL

## 2019-01-01 ENCOUNTER — HOSPITAL ENCOUNTER (OUTPATIENT)
Dept: RADIOLOGY | Facility: HOSPITAL | Age: 29
Discharge: HOME | End: 2019-07-31
Attending: INTERNAL MEDICINE
Payer: MEDICAID

## 2019-01-01 ENCOUNTER — HOSPITAL ENCOUNTER (OUTPATIENT)
Dept: RADIOLOGY | Facility: HOSPITAL | Age: 29
Discharge: HOME | End: 2019-01-09
Attending: INTERNAL MEDICINE
Payer: COMMERCIAL

## 2019-01-01 ENCOUNTER — ANESTHESIA (OUTPATIENT)
Dept: SURGERY | Facility: HOSPITAL | Age: 29
DRG: 007 | End: 2019-01-01
Payer: MEDICAID

## 2019-01-01 ENCOUNTER — OFFICE VISIT (OUTPATIENT)
Dept: OTOLARYNGOLOGY | Facility: CLINIC | Age: 29
End: 2019-01-01
Payer: COMMERCIAL

## 2019-01-01 ENCOUNTER — TELEPHONE (OUTPATIENT)
Dept: CARDIOTHORACIC SURGERY | Facility: CLINIC | Age: 29
End: 2019-01-01

## 2019-01-01 ENCOUNTER — HOSPITAL ENCOUNTER (OUTPATIENT)
Dept: RADIOLOGY | Facility: HOSPITAL | Age: 29
Discharge: HOME | End: 2019-01-23
Attending: OTOLARYNGOLOGY
Payer: COMMERCIAL

## 2019-01-01 ENCOUNTER — ANESTHESIA EVENT (OUTPATIENT)
Dept: SURGERY | Facility: HOSPITAL | Age: 29
End: 2019-01-01
Payer: MEDICAID

## 2019-01-01 ENCOUNTER — HOSPITAL ENCOUNTER (OUTPATIENT)
Dept: RADIOLOGY | Facility: HOSPITAL | Age: 29
Discharge: HOME | End: 2019-08-14
Attending: INTERNAL MEDICINE
Payer: MEDICAID

## 2019-01-01 ENCOUNTER — HOSPITAL ENCOUNTER (OUTPATIENT)
Dept: RADIOLOGY | Facility: HOSPITAL | Age: 29
Discharge: HOME | End: 2019-07-19
Attending: INTERNAL MEDICINE
Payer: MEDICAID

## 2019-01-01 ENCOUNTER — ANESTHESIA (OUTPATIENT)
Dept: ENDOSCOPY | Facility: HOSPITAL | Age: 29
End: 2019-01-01
Payer: COMMERCIAL

## 2019-01-01 ENCOUNTER — HOSPITAL ENCOUNTER (OUTPATIENT)
Dept: RADIOLOGY | Facility: HOSPITAL | Age: 29
Discharge: HOME | End: 2019-08-07
Attending: INTERNAL MEDICINE
Payer: MEDICAID

## 2019-01-01 ENCOUNTER — LAB VISIT (OUTPATIENT)
Dept: LAB | Facility: HOSPITAL | Age: 29
End: 2019-01-01
Payer: COMMERCIAL

## 2019-01-01 ENCOUNTER — HOSPITAL ENCOUNTER (OUTPATIENT)
Dept: RADIOLOGY | Facility: HOSPITAL | Age: 29
Discharge: HOME | End: 2019-01-22
Attending: INTERNAL MEDICINE
Payer: COMMERCIAL

## 2019-01-01 ENCOUNTER — NUTRITION (OUTPATIENT)
Dept: TRANSPLANT | Facility: CLINIC | Age: 29
End: 2019-01-01
Payer: COMMERCIAL

## 2019-01-01 ENCOUNTER — HOSPITAL ENCOUNTER (INPATIENT)
Facility: HOSPITAL | Age: 29
LOS: 29 days | Discharge: HOME | DRG: 007 | End: 2019-07-17
Attending: INTERNAL MEDICINE | Admitting: INTERNAL MEDICINE
Payer: MEDICAID

## 2019-01-01 ENCOUNTER — HOSPITAL ENCOUNTER (OUTPATIENT)
Dept: PULMONOLOGY | Facility: HOSPITAL | Age: 29
Discharge: HOME | End: 2019-07-31
Attending: INTERNAL MEDICINE
Payer: MEDICAID

## 2019-01-01 ENCOUNTER — LAB VISIT (OUTPATIENT)
Dept: LAB | Facility: HOSPITAL | Age: 29
End: 2019-01-01
Payer: MEDICAID

## 2019-01-01 ENCOUNTER — HOSPITAL ENCOUNTER (OUTPATIENT)
Dept: PULMONOLOGY | Facility: HOSPITAL | Age: 29
Discharge: HOME | End: 2019-08-14
Attending: INTERNAL MEDICINE
Payer: MEDICAID

## 2019-01-01 ENCOUNTER — HOSPITAL ENCOUNTER (OUTPATIENT)
Dept: PULMONOLOGY | Facility: CLINIC | Age: 29
Discharge: HOME | End: 2019-08-22
Attending: INTERNAL MEDICINE
Payer: MEDICAID

## 2019-01-01 ENCOUNTER — HOSPITAL ENCOUNTER (OUTPATIENT)
Facility: HOSPITAL | Age: 29
Discharge: HOME | End: 2019-01-22
Attending: INTERNAL MEDICINE | Admitting: INTERNAL MEDICINE
Payer: COMMERCIAL

## 2019-01-01 ENCOUNTER — HOSPITAL ENCOUNTER (INPATIENT)
Facility: HOSPITAL | Age: 29
LOS: 2 days | Discharge: HOME | DRG: 206 | End: 2019-08-17
Attending: INTERNAL MEDICINE | Admitting: INTERNAL MEDICINE
Payer: MEDICAID

## 2019-01-01 ENCOUNTER — HOSPITAL ENCOUNTER (OUTPATIENT)
Dept: PULMONOLOGY | Facility: CLINIC | Age: 29
Discharge: HOME | End: 2019-06-06
Payer: MEDICAID

## 2019-01-01 ENCOUNTER — OFFICE VISIT (OUTPATIENT)
Dept: OBSTETRICS AND GYNECOLOGY | Facility: CLINIC | Age: 29
End: 2019-01-01
Payer: COMMERCIAL

## 2019-01-01 ENCOUNTER — CONFERENCE (OUTPATIENT)
Dept: TRANSPLANT | Facility: CLINIC | Age: 29
End: 2019-01-01

## 2019-01-01 ENCOUNTER — TELEPHONE (OUTPATIENT)
Dept: RADIOLOGY | Facility: HOSPITAL | Age: 29
End: 2019-01-01

## 2019-01-01 ENCOUNTER — ANESTHESIA (OUTPATIENT)
Dept: INTENSIVE CARE | Facility: HOSPITAL | Age: 29
DRG: 007 | End: 2019-01-01
Payer: MEDICAID

## 2019-01-01 ENCOUNTER — ANESTHESIA (OUTPATIENT)
Dept: SURGERY | Facility: HOSPITAL | Age: 29
End: 2019-01-01
Payer: MEDICAID

## 2019-01-01 ENCOUNTER — OFFICE VISIT (OUTPATIENT)
Dept: CARDIOTHORACIC SURGERY | Facility: CLINIC | Age: 29
End: 2019-01-01
Payer: COMMERCIAL

## 2019-01-01 ENCOUNTER — TELEPHONE (OUTPATIENT)
Dept: ENDOCRINOLOGY | Facility: HOSPITAL | Age: 29
End: 2019-01-01

## 2019-01-01 ENCOUNTER — HOSPITAL ENCOUNTER (OUTPATIENT)
Facility: HOSPITAL | Age: 29
Discharge: HOME | End: 2019-07-25
Attending: THORACIC SURGERY (CARDIOTHORACIC VASCULAR SURGERY) | Admitting: THORACIC SURGERY (CARDIOTHORACIC VASCULAR SURGERY)
Payer: MEDICAID

## 2019-01-01 ENCOUNTER — HOSPITAL ENCOUNTER (OUTPATIENT)
Dept: PULMONOLOGY | Facility: CLINIC | Age: 29
Discharge: HOME | End: 2019-05-08
Payer: MEDICAID

## 2019-01-01 ENCOUNTER — HOSPITAL ENCOUNTER (OUTPATIENT)
Dept: PULMONOLOGY | Facility: HOSPITAL | Age: 29
Discharge: HOME | End: 2019-08-07
Attending: INTERNAL MEDICINE
Payer: MEDICAID

## 2019-01-01 ENCOUNTER — CLINICAL SUPPORT (OUTPATIENT)
Dept: INFECTIOUS DISEASES | Facility: CLINIC | Age: 29
End: 2019-01-01
Payer: COMMERCIAL

## 2019-01-01 ENCOUNTER — EXTERNAL HOME HEALTH (OUTPATIENT)
Dept: HOME HEALTH SERVICES | Facility: HOSPITAL | Age: 29
End: 2019-01-01
Payer: MEDICAID

## 2019-01-01 ENCOUNTER — OFFICE VISIT (OUTPATIENT)
Dept: OTOLARYNGOLOGY | Facility: CLINIC | Age: 29
End: 2019-01-01
Payer: MEDICAID

## 2019-01-01 ENCOUNTER — HOSPITAL ENCOUNTER (OUTPATIENT)
Dept: RADIOLOGY | Facility: HOSPITAL | Age: 29
Discharge: HOME | End: 2019-08-22
Attending: INTERNAL MEDICINE
Payer: MEDICAID

## 2019-01-01 ENCOUNTER — ANESTHESIA EVENT (OUTPATIENT)
Dept: SURGERY | Facility: HOSPITAL | Age: 29
DRG: 135 | End: 2019-01-01
Payer: COMMERCIAL

## 2019-01-01 VITALS
SYSTOLIC BLOOD PRESSURE: 113 MMHG | BODY MASS INDEX: 23.56 KG/M2 | HEIGHT: 61 IN | WEIGHT: 134 LBS | RESPIRATION RATE: 16 BRPM | OXYGEN SATURATION: 96 % | BODY MASS INDEX: 25.3 KG/M2 | DIASTOLIC BLOOD PRESSURE: 85 MMHG | HEART RATE: 110 BPM | OXYGEN SATURATION: 91 % | RESPIRATION RATE: 20 BRPM | BODY MASS INDEX: 21.9 KG/M2 | WEIGHT: 116 LBS | SYSTOLIC BLOOD PRESSURE: 99 MMHG | HEIGHT: 61 IN | RESPIRATION RATE: 20 BRPM | DIASTOLIC BLOOD PRESSURE: 70 MMHG | SYSTOLIC BLOOD PRESSURE: 128 MMHG | DIASTOLIC BLOOD PRESSURE: 66 MMHG | HEART RATE: 94 BPM | TEMPERATURE: 98 F | WEIGHT: 120 LBS | HEIGHT: 60 IN | HEART RATE: 107 BPM | TEMPERATURE: 98 F | OXYGEN SATURATION: 95 % | TEMPERATURE: 99 F

## 2019-01-01 VITALS
HEIGHT: 61 IN | BODY MASS INDEX: 23.43 KG/M2 | WEIGHT: 125 LBS | WEIGHT: 124 LBS | BODY MASS INDEX: 23.6 KG/M2 | HEART RATE: 83 BPM | DIASTOLIC BLOOD PRESSURE: 76 MMHG | SYSTOLIC BLOOD PRESSURE: 118 MMHG

## 2019-01-01 VITALS
WEIGHT: 120 LBS | HEART RATE: 76 BPM | BODY MASS INDEX: 23.44 KG/M2 | SYSTOLIC BLOOD PRESSURE: 138 MMHG | DIASTOLIC BLOOD PRESSURE: 86 MMHG

## 2019-01-01 VITALS
DIASTOLIC BLOOD PRESSURE: 83 MMHG | HEART RATE: 99 BPM | BODY MASS INDEX: 22.68 KG/M2 | SYSTOLIC BLOOD PRESSURE: 122 MMHG | RESPIRATION RATE: 14 BRPM | TEMPERATURE: 99 F | OXYGEN SATURATION: 93 % | WEIGHT: 115.5 LBS | HEIGHT: 60 IN

## 2019-01-01 VITALS
BODY MASS INDEX: 25.62 KG/M2 | HEIGHT: 60 IN | HEART RATE: 93 BPM | DIASTOLIC BLOOD PRESSURE: 76 MMHG | WEIGHT: 130.5 LBS | OXYGEN SATURATION: 95 % | SYSTOLIC BLOOD PRESSURE: 132 MMHG

## 2019-01-01 VITALS
DIASTOLIC BLOOD PRESSURE: 91 MMHG | SYSTOLIC BLOOD PRESSURE: 116 MMHG | HEART RATE: 110 BPM | WEIGHT: 114 LBS | BODY MASS INDEX: 21.52 KG/M2 | RESPIRATION RATE: 20 BRPM | HEIGHT: 61 IN | OXYGEN SATURATION: 93 % | TEMPERATURE: 99 F

## 2019-01-01 VITALS
HEIGHT: 61 IN | RESPIRATION RATE: 18 BRPM | SYSTOLIC BLOOD PRESSURE: 129 MMHG | HEART RATE: 91 BPM | WEIGHT: 124.13 LBS | SYSTOLIC BLOOD PRESSURE: 114 MMHG | WEIGHT: 120 LBS | TEMPERATURE: 99 F | HEART RATE: 89 BPM | BODY MASS INDEX: 23.43 KG/M2 | DIASTOLIC BLOOD PRESSURE: 79 MMHG | HEIGHT: 60 IN | TEMPERATURE: 99 F | OXYGEN SATURATION: 93 % | DIASTOLIC BLOOD PRESSURE: 82 MMHG | BODY MASS INDEX: 23.56 KG/M2

## 2019-01-01 VITALS
BODY MASS INDEX: 30.29 KG/M2 | HEART RATE: 122 BPM | RESPIRATION RATE: 25 BRPM | WEIGHT: 154.31 LBS | DIASTOLIC BLOOD PRESSURE: 64 MMHG | HEIGHT: 60 IN | TEMPERATURE: 98 F | OXYGEN SATURATION: 82 % | SYSTOLIC BLOOD PRESSURE: 103 MMHG

## 2019-01-01 VITALS
BODY MASS INDEX: 22.89 KG/M2 | HEART RATE: 85 BPM | TEMPERATURE: 98 F | SYSTOLIC BLOOD PRESSURE: 104 MMHG | HEART RATE: 88 BPM | DIASTOLIC BLOOD PRESSURE: 74 MMHG | DIASTOLIC BLOOD PRESSURE: 76 MMHG | HEIGHT: 61 IN | WEIGHT: 121.25 LBS | SYSTOLIC BLOOD PRESSURE: 117 MMHG

## 2019-01-01 VITALS
TEMPERATURE: 99 F | DIASTOLIC BLOOD PRESSURE: 85 MMHG | BODY MASS INDEX: 21.9 KG/M2 | HEART RATE: 121 BPM | SYSTOLIC BLOOD PRESSURE: 111 MMHG | SYSTOLIC BLOOD PRESSURE: 110 MMHG | RESPIRATION RATE: 20 BRPM | HEIGHT: 60 IN | DIASTOLIC BLOOD PRESSURE: 72 MMHG | WEIGHT: 115 LBS | BODY MASS INDEX: 22.72 KG/M2 | DIASTOLIC BLOOD PRESSURE: 83 MMHG | HEART RATE: 97 BPM | HEIGHT: 60 IN | WEIGHT: 116 LBS | OXYGEN SATURATION: 92 % | OXYGEN SATURATION: 92 % | SYSTOLIC BLOOD PRESSURE: 120 MMHG | WEIGHT: 115.75 LBS | HEIGHT: 61 IN | OXYGEN SATURATION: 94 % | TEMPERATURE: 98 F | RESPIRATION RATE: 20 BRPM | BODY MASS INDEX: 22.58 KG/M2 | TEMPERATURE: 98 F | HEART RATE: 101 BPM

## 2019-01-01 VITALS
WEIGHT: 125.25 LBS | HEIGHT: 60 IN | DIASTOLIC BLOOD PRESSURE: 76 MMHG | SYSTOLIC BLOOD PRESSURE: 117 MMHG | BODY MASS INDEX: 24.59 KG/M2 | HEART RATE: 95 BPM

## 2019-01-01 VITALS
RESPIRATION RATE: 20 BRPM | TEMPERATURE: 98 F | DIASTOLIC BLOOD PRESSURE: 87 MMHG | BODY MASS INDEX: 24.55 KG/M2 | SYSTOLIC BLOOD PRESSURE: 130 MMHG | HEART RATE: 102 BPM | HEIGHT: 61 IN | WEIGHT: 130 LBS | OXYGEN SATURATION: 97 %

## 2019-01-01 VITALS
BODY MASS INDEX: 24.45 KG/M2 | HEIGHT: 61 IN | BODY MASS INDEX: 23.19 KG/M2 | OXYGEN SATURATION: 97 % | WEIGHT: 123 LBS | WEIGHT: 122.81 LBS | RESPIRATION RATE: 20 BRPM | HEIGHT: 60 IN | TEMPERATURE: 97 F | HEIGHT: 61 IN | HEART RATE: 96 BPM | DIASTOLIC BLOOD PRESSURE: 79 MMHG | WEIGHT: 124.56 LBS | BODY MASS INDEX: 23.22 KG/M2 | SYSTOLIC BLOOD PRESSURE: 115 MMHG

## 2019-01-01 VITALS
WEIGHT: 114 LBS | HEIGHT: 60 IN | HEART RATE: 120 BPM | BODY MASS INDEX: 22.38 KG/M2 | SYSTOLIC BLOOD PRESSURE: 121 MMHG | DIASTOLIC BLOOD PRESSURE: 89 MMHG

## 2019-01-01 VITALS
DIASTOLIC BLOOD PRESSURE: 107 MMHG | TEMPERATURE: 97 F | HEART RATE: 80 BPM | SYSTOLIC BLOOD PRESSURE: 156 MMHG | HEIGHT: 60 IN | RESPIRATION RATE: 16 BRPM | WEIGHT: 114 LBS | BODY MASS INDEX: 22.38 KG/M2 | OXYGEN SATURATION: 95 %

## 2019-01-01 VITALS
TEMPERATURE: 98 F | BODY MASS INDEX: 30.47 KG/M2 | OXYGEN SATURATION: 92 % | WEIGHT: 155.19 LBS | HEART RATE: 90 BPM | RESPIRATION RATE: 16 BRPM | HEIGHT: 60 IN | SYSTOLIC BLOOD PRESSURE: 102 MMHG | DIASTOLIC BLOOD PRESSURE: 70 MMHG

## 2019-01-01 VITALS
BODY MASS INDEX: 25.3 KG/M2 | TEMPERATURE: 97 F | HEART RATE: 87 BPM | WEIGHT: 134 LBS | RESPIRATION RATE: 20 BRPM | DIASTOLIC BLOOD PRESSURE: 87 MMHG | HEIGHT: 61 IN | SYSTOLIC BLOOD PRESSURE: 137 MMHG | OXYGEN SATURATION: 98 %

## 2019-01-01 VITALS
OXYGEN SATURATION: 100 % | RESPIRATION RATE: 18 BRPM | BODY MASS INDEX: 24.54 KG/M2 | HEIGHT: 60 IN | TEMPERATURE: 97 F | SYSTOLIC BLOOD PRESSURE: 133 MMHG | DIASTOLIC BLOOD PRESSURE: 97 MMHG | WEIGHT: 125 LBS | HEART RATE: 80 BPM

## 2019-01-01 VITALS — BODY MASS INDEX: 23.11 KG/M2 | WEIGHT: 122.38 LBS | HEIGHT: 61 IN

## 2019-01-01 VITALS
HEART RATE: 96 BPM | WEIGHT: 123.44 LBS | BODY MASS INDEX: 24.11 KG/M2 | SYSTOLIC BLOOD PRESSURE: 120 MMHG | DIASTOLIC BLOOD PRESSURE: 94 MMHG

## 2019-01-01 VITALS
BODY MASS INDEX: 24.15 KG/M2 | HEIGHT: 60 IN | SYSTOLIC BLOOD PRESSURE: 133 MMHG | WEIGHT: 123 LBS | DIASTOLIC BLOOD PRESSURE: 91 MMHG

## 2019-01-01 VITALS
HEIGHT: 60 IN | TEMPERATURE: 98 F | HEART RATE: 80 BPM | DIASTOLIC BLOOD PRESSURE: 83 MMHG | WEIGHT: 123.44 LBS | BODY MASS INDEX: 24.23 KG/M2 | SYSTOLIC BLOOD PRESSURE: 131 MMHG | OXYGEN SATURATION: 97 %

## 2019-01-01 VITALS — BODY MASS INDEX: 24.64 KG/M2 | WEIGHT: 130.5 LBS | HEIGHT: 61 IN

## 2019-01-01 DIAGNOSIS — T86.819 COMPLICATION OF TRANSPLANTED LUNG, UNSPECIFIED COMPLICATION: ICD-10-CM

## 2019-01-01 DIAGNOSIS — J33.9 NASAL POLYPOSIS: ICD-10-CM

## 2019-01-01 DIAGNOSIS — Z94.2 LUNG REPLACED BY TRANSPLANT: ICD-10-CM

## 2019-01-01 DIAGNOSIS — J44.81 BRONCHIOLITIS OBLITERANS SYNDROME DUE TO LUNG TRANSPLANTATION: ICD-10-CM

## 2019-01-01 DIAGNOSIS — Z01.818 PRE-OP EVALUATION: ICD-10-CM

## 2019-01-01 DIAGNOSIS — Z76.82 AWAITING TRANSPLANTATION OF LUNG: Primary | ICD-10-CM

## 2019-01-01 DIAGNOSIS — J44.81 BRONCHIOLITIS OBLITERANS SYNDROME: ICD-10-CM

## 2019-01-01 DIAGNOSIS — R53.81 DEBILITY: ICD-10-CM

## 2019-01-01 DIAGNOSIS — J22 LRTI (LOWER RESPIRATORY TRACT INFECTION): ICD-10-CM

## 2019-01-01 DIAGNOSIS — J22 LRTI (LOWER RESPIRATORY TRACT INFECTION): Primary | ICD-10-CM

## 2019-01-01 DIAGNOSIS — B37.9 CANDIDA GLABRATA INFECTION: ICD-10-CM

## 2019-01-01 DIAGNOSIS — T81.32XD STERNAL WOUND DEHISCENCE, SUBSEQUENT ENCOUNTER: Primary | ICD-10-CM

## 2019-01-01 DIAGNOSIS — Z76.82 AWAITING TRANSPLANTATION OF LUNG: ICD-10-CM

## 2019-01-01 DIAGNOSIS — Z51.81 THERAPEUTIC DRUG MONITORING: Primary | ICD-10-CM

## 2019-01-01 DIAGNOSIS — E83.42 HYPOMAGNESEMIA: ICD-10-CM

## 2019-01-01 DIAGNOSIS — Z94.2 S/P LUNG TRANSPLANT: Primary | ICD-10-CM

## 2019-01-01 DIAGNOSIS — R09.02 HYPOXIA: ICD-10-CM

## 2019-01-01 DIAGNOSIS — A49.8 INFECTION DUE TO CARBAPENEM RESISTANT PSEUDOMONAS AERUGINOSA: ICD-10-CM

## 2019-01-01 DIAGNOSIS — G43.909 MIGRAINE WITHOUT STATUS MIGRAINOSUS, NOT INTRACTABLE, UNSPECIFIED MIGRAINE TYPE: ICD-10-CM

## 2019-01-01 DIAGNOSIS — Z79.52 CURRENT CHRONIC USE OF SYSTEMIC STEROIDS: ICD-10-CM

## 2019-01-01 DIAGNOSIS — J22 LOWER RESPIRATORY TRACT INFECTION: ICD-10-CM

## 2019-01-01 DIAGNOSIS — D84.9 IMMUNOSUPPRESSION: ICD-10-CM

## 2019-01-01 DIAGNOSIS — Z48.298 ENCOUNTER FOLLOWING ORGAN TRANSPLANT: Primary | ICD-10-CM

## 2019-01-01 DIAGNOSIS — E84.0 CYSTIC FIBROSIS WITH PULMONARY MANIFESTATIONS: ICD-10-CM

## 2019-01-01 DIAGNOSIS — T86.818 OTHER COMPLICATIONS OF LUNG TRANSPLANT: Primary | ICD-10-CM

## 2019-01-01 DIAGNOSIS — T81.32XA STERNAL WOUND DEHISCENCE, INITIAL ENCOUNTER: ICD-10-CM

## 2019-01-01 DIAGNOSIS — Z94.2 S/P LUNG TRANSPLANT: ICD-10-CM

## 2019-01-01 DIAGNOSIS — Z79.2 PROPHYLACTIC ANTIBIOTIC: ICD-10-CM

## 2019-01-01 DIAGNOSIS — B44.9 ASPERGILLOSIS: ICD-10-CM

## 2019-01-01 DIAGNOSIS — R79.89 ELEVATED LIVER FUNCTION TESTS: ICD-10-CM

## 2019-01-01 DIAGNOSIS — Z22.39 PSEUDOMONAS AERUGINOSA COLONIZATION: Primary | ICD-10-CM

## 2019-01-01 DIAGNOSIS — G89.4 CHRONIC PAIN SYNDROME: ICD-10-CM

## 2019-01-01 DIAGNOSIS — G89.28 OTHER CHRONIC POSTPROCEDURAL PAIN: ICD-10-CM

## 2019-01-01 DIAGNOSIS — T38.0X5A ADRENAL CORTICAL STEROIDS CAUSING ADVERSE EFFECT IN THERAPEUTIC USE: ICD-10-CM

## 2019-01-01 DIAGNOSIS — R06.02 SOB (SHORTNESS OF BREATH): ICD-10-CM

## 2019-01-01 DIAGNOSIS — F41.1 GENERALIZED ANXIETY DISORDER: Primary | ICD-10-CM

## 2019-01-01 DIAGNOSIS — M62.830 MUSCLE SPASM OF BACK: ICD-10-CM

## 2019-01-01 DIAGNOSIS — R94.4 DECREASED CREATININE CLEARANCE: ICD-10-CM

## 2019-01-01 DIAGNOSIS — T50.905A MEDICATION ADVERSE EFFECT: ICD-10-CM

## 2019-01-01 DIAGNOSIS — J96.01 ACUTE RESPIRATORY FAILURE WITH HYPOXIA: ICD-10-CM

## 2019-01-01 DIAGNOSIS — R73.9 ACUTE HYPERGLYCEMIA: ICD-10-CM

## 2019-01-01 DIAGNOSIS — G89.29 CHRONIC NONINTRACTABLE HEADACHE, UNSPECIFIED HEADACHE TYPE: Primary | ICD-10-CM

## 2019-01-01 DIAGNOSIS — Z78.9 ON ENTERAL NUTRITION: ICD-10-CM

## 2019-01-01 DIAGNOSIS — T86.818 BRONCHIOLITIS OBLITERANS SYNDROME DUE TO LUNG TRANSPLANTATION: Primary | ICD-10-CM

## 2019-01-01 DIAGNOSIS — Z79.899 USES NEBULIZER AND INHALER AT HOME: ICD-10-CM

## 2019-01-01 DIAGNOSIS — A41.9 SEPTIC SHOCK: ICD-10-CM

## 2019-01-01 DIAGNOSIS — Z94.2 LUNG REPLACED BY TRANSPLANT: Primary | ICD-10-CM

## 2019-01-01 DIAGNOSIS — I99.8 LIMB ISCHEMIA: ICD-10-CM

## 2019-01-01 DIAGNOSIS — K86.89 PANCREATIC INSUFFICIENCY: ICD-10-CM

## 2019-01-01 DIAGNOSIS — Z29.89 NEED FOR PNEUMOCYSTIS PROPHYLAXIS: ICD-10-CM

## 2019-01-01 DIAGNOSIS — J01.01 ACUTE RECURRENT MAXILLARY SINUSITIS: ICD-10-CM

## 2019-01-01 DIAGNOSIS — E84.0 CYSTIC FIBROSIS WITH PULMONARY MANIFESTATIONS: Primary | ICD-10-CM

## 2019-01-01 DIAGNOSIS — J44.81 BRONCHIOLITIS OBLITERANS SYNDROME DUE TO LUNG TRANSPLANTATION: Primary | ICD-10-CM

## 2019-01-01 DIAGNOSIS — B44.9 ASPERGILLUS: ICD-10-CM

## 2019-01-01 DIAGNOSIS — Z22.39 PSEUDOMONAS AERUGINOSA COLONIZATION: ICD-10-CM

## 2019-01-01 DIAGNOSIS — R11.0 NAUSEA: ICD-10-CM

## 2019-01-01 DIAGNOSIS — Z51.81 THERAPEUTIC DRUG MONITORING: ICD-10-CM

## 2019-01-01 DIAGNOSIS — J22 LOWER RESPIRATORY INFECTION: Primary | ICD-10-CM

## 2019-01-01 DIAGNOSIS — R00.0 SINUS TACHYCARDIA: ICD-10-CM

## 2019-01-01 DIAGNOSIS — H66.002 ACUTE SUPPURATIVE OTITIS MEDIA OF LEFT EAR: ICD-10-CM

## 2019-01-01 DIAGNOSIS — E53.8 FOLATE DEFICIENCY: ICD-10-CM

## 2019-01-01 DIAGNOSIS — T86.818 OTHER COMPLICATION OF LUNG TRANSPLANT: Primary | ICD-10-CM

## 2019-01-01 DIAGNOSIS — Z12.11 SPECIAL SCREENING FOR MALIGNANT NEOPLASMS, COLON: Primary | ICD-10-CM

## 2019-01-01 DIAGNOSIS — J44.81 BRONCHIOLITIS OBLITERANS SYNDROME: Primary | ICD-10-CM

## 2019-01-01 DIAGNOSIS — R51.9 CHRONIC NONINTRACTABLE HEADACHE, UNSPECIFIED HEADACHE TYPE: Primary | ICD-10-CM

## 2019-01-01 DIAGNOSIS — R09.89 SYMPTOMS OF UPPER RESPIRATORY INFECTION (URI): ICD-10-CM

## 2019-01-01 DIAGNOSIS — K21.9 GASTROESOPHAGEAL REFLUX DISEASE WITHOUT ESOPHAGITIS: Primary | ICD-10-CM

## 2019-01-01 DIAGNOSIS — F41.9 ANXIETY: ICD-10-CM

## 2019-01-01 DIAGNOSIS — I49.9 ARRHYTHMIA: ICD-10-CM

## 2019-01-01 DIAGNOSIS — Z74.09 IMPAIRED FUNCTIONAL MOBILITY AND ENDURANCE: ICD-10-CM

## 2019-01-01 DIAGNOSIS — Z94.2 LUNG TRANSPLANT STATUS, BILATERAL: ICD-10-CM

## 2019-01-01 DIAGNOSIS — Z76.82 LUNG TRANSPLANT CANDIDATE: ICD-10-CM

## 2019-01-01 DIAGNOSIS — I51.3 RIGHT ATRIAL THROMBUS: ICD-10-CM

## 2019-01-01 DIAGNOSIS — J96.11 CHRONIC RESPIRATORY FAILURE WITH HYPOXIA: ICD-10-CM

## 2019-01-01 DIAGNOSIS — R74.8 ELEVATED LIVER ENZYMES: ICD-10-CM

## 2019-01-01 DIAGNOSIS — Z01.818 PRE-TRANSPLANT EVALUATION FOR LUNG TRANSPLANT: ICD-10-CM

## 2019-01-01 DIAGNOSIS — E84.9 CYSTIC FIBROSIS: ICD-10-CM

## 2019-01-01 DIAGNOSIS — J32.0 CHRONIC MAXILLARY SINUSITIS: ICD-10-CM

## 2019-01-01 DIAGNOSIS — T86.818 BRONCHIOLITIS OBLITERANS SYNDROME DUE TO LUNG TRANSPLANTATION: ICD-10-CM

## 2019-01-01 DIAGNOSIS — T86.819 COMPLICATION OF TRANSPLANTED LUNG, UNSPECIFIED COMPLICATION: Primary | ICD-10-CM

## 2019-01-01 DIAGNOSIS — K21.9 GASTROESOPHAGEAL REFLUX DISEASE WITHOUT ESOPHAGITIS: ICD-10-CM

## 2019-01-01 DIAGNOSIS — Z29.89 NEED FOR SBE (SUBACUTE BACTERIAL ENDOCARDITIS) PROPHYLAXIS: Primary | ICD-10-CM

## 2019-01-01 DIAGNOSIS — J96.00 ACUTE RESPIRATORY FAILURE: ICD-10-CM

## 2019-01-01 DIAGNOSIS — R06.2 WHEEZING: ICD-10-CM

## 2019-01-01 DIAGNOSIS — J32.4 CHRONIC PANSINUSITIS: Primary | ICD-10-CM

## 2019-01-01 DIAGNOSIS — J45.20 REACTIVE AIRWAY DISEASE, MILD INTERMITTENT, UNCOMPLICATED: ICD-10-CM

## 2019-01-01 DIAGNOSIS — Z29.89 PROPHYLACTIC IMMUNOTHERAPY: ICD-10-CM

## 2019-01-01 DIAGNOSIS — T81.32XA STERNAL WOUND DEHISCENCE: Primary | ICD-10-CM

## 2019-01-01 DIAGNOSIS — J47.9 BRONCHIECTASIS WITHOUT COMPLICATION: ICD-10-CM

## 2019-01-01 DIAGNOSIS — R73.9 HYPERGLYCEMIA: ICD-10-CM

## 2019-01-01 DIAGNOSIS — E87.20 METABOLIC ACIDOSIS: ICD-10-CM

## 2019-01-01 DIAGNOSIS — K31.89 GASTRIC HYPERACIDITY: ICD-10-CM

## 2019-01-01 DIAGNOSIS — J15.211 STAPHYLOCOCCUS AUREUS PNEUMONIA: Primary | ICD-10-CM

## 2019-01-01 DIAGNOSIS — B37.9 INFECTION DUE TO CANDIDA GLABRATA: Primary | ICD-10-CM

## 2019-01-01 DIAGNOSIS — H65.05 RECURRENT ACUTE SEROUS OTITIS MEDIA OF LEFT EAR: ICD-10-CM

## 2019-01-01 DIAGNOSIS — A49.8 PSEUDOMONAS AERUGINOSA INFECTION: ICD-10-CM

## 2019-01-01 DIAGNOSIS — Z94.2 LUNG TRANSPLANTED: ICD-10-CM

## 2019-01-01 DIAGNOSIS — Z76.82 AWAITING ORGAN TRANSPLANT: ICD-10-CM

## 2019-01-01 DIAGNOSIS — G89.29 OTHER CHRONIC PAIN: Primary | ICD-10-CM

## 2019-01-01 DIAGNOSIS — E87.29 RESPIRATORY ACIDOSIS: ICD-10-CM

## 2019-01-01 DIAGNOSIS — Z48.298 ENCOUNTER FOLLOWING ORGAN TRANSPLANT: ICD-10-CM

## 2019-01-01 DIAGNOSIS — Z16.13 INFECTION DUE TO CARBAPENEM RESISTANT PSEUDOMONAS AERUGINOSA: ICD-10-CM

## 2019-01-01 DIAGNOSIS — N17.9 AKI (ACUTE KIDNEY INJURY): ICD-10-CM

## 2019-01-01 DIAGNOSIS — F32.A DEPRESSION, UNSPECIFIED DEPRESSION TYPE: Primary | ICD-10-CM

## 2019-01-01 DIAGNOSIS — J22 LOWER RESPIRATORY INFECTION: ICD-10-CM

## 2019-01-01 DIAGNOSIS — H65.22 LEFT CHRONIC SEROUS OTITIS MEDIA: Primary | ICD-10-CM

## 2019-01-01 DIAGNOSIS — Z94.2 LUNG TRANSPLANT STATUS: ICD-10-CM

## 2019-01-01 DIAGNOSIS — B37.9 INFECTION DUE TO CANDIDA GLABRATA: ICD-10-CM

## 2019-01-01 DIAGNOSIS — J15.211 STAPHYLOCOCCUS AUREUS PNEUMONIA: ICD-10-CM

## 2019-01-01 DIAGNOSIS — E08.9 DIABETES MELLITUS RELATED TO CYSTIC FIBROSIS: ICD-10-CM

## 2019-01-01 DIAGNOSIS — Z01.818 PREOPERATIVE TESTING: ICD-10-CM

## 2019-01-01 DIAGNOSIS — R74.8 ELEVATED LIVER ENZYMES: Primary | ICD-10-CM

## 2019-01-01 DIAGNOSIS — Z71.85 VACCINE COUNSELING: Primary | ICD-10-CM

## 2019-01-01 DIAGNOSIS — R53.81 DEBILITY: Primary | ICD-10-CM

## 2019-01-01 DIAGNOSIS — I99.8 ISCHEMIC LEG: ICD-10-CM

## 2019-01-01 DIAGNOSIS — E84.8 DIABETES MELLITUS RELATED TO CYSTIC FIBROSIS: ICD-10-CM

## 2019-01-01 DIAGNOSIS — R00.0 TACHYCARDIA: ICD-10-CM

## 2019-01-01 DIAGNOSIS — R65.21 SEPTIC SHOCK: ICD-10-CM

## 2019-01-01 DIAGNOSIS — R09.89 SYMPTOMS OF UPPER RESPIRATORY INFECTION (URI): Primary | ICD-10-CM

## 2019-01-01 DIAGNOSIS — Z01.419 ENCOUNTER FOR GYNECOLOGICAL EXAMINATION WITHOUT ABNORMAL FINDING: Primary | ICD-10-CM

## 2019-01-01 LAB
A1AT PHENOTYP SERPL-IMP: NORMAL BANDS
A1AT SERPL NEPH-MCNC: 183 MG/DL (ref 100–190)
ABO + RH BLD: NORMAL
ABO GROUP BLD: NORMAL
ACID FAST MOD KINY STN SPEC: NORMAL
ADENOVIRUS: NOT DETECTED
ALBUMIN SERPL BCP-MCNC: 1 G/DL (ref 3.5–5.2)
ALBUMIN SERPL BCP-MCNC: 1.1 G/DL (ref 3.5–5.2)
ALBUMIN SERPL BCP-MCNC: 1.2 G/DL (ref 3.5–5.2)
ALBUMIN SERPL BCP-MCNC: 1.3 G/DL (ref 3.5–5.2)
ALBUMIN SERPL BCP-MCNC: 1.4 G/DL (ref 3.5–5.2)
ALBUMIN SERPL BCP-MCNC: 1.4 G/DL (ref 3.5–5.2)
ALBUMIN SERPL BCP-MCNC: 1.5 G/DL (ref 3.5–5.2)
ALBUMIN SERPL BCP-MCNC: 1.8 G/DL (ref 3.5–5.2)
ALBUMIN SERPL BCP-MCNC: 1.8 G/DL (ref 3.5–5.2)
ALBUMIN SERPL BCP-MCNC: 2.1 G/DL (ref 3.5–5.2)
ALBUMIN SERPL BCP-MCNC: 2.2 G/DL (ref 3.5–5.2)
ALBUMIN SERPL BCP-MCNC: 2.3 G/DL (ref 3.5–5.2)
ALBUMIN SERPL BCP-MCNC: 2.4 G/DL (ref 3.5–5.2)
ALBUMIN SERPL BCP-MCNC: 2.5 G/DL (ref 3.5–5.2)
ALBUMIN SERPL BCP-MCNC: 2.6 G/DL (ref 3.5–5.2)
ALBUMIN SERPL BCP-MCNC: 2.7 G/DL (ref 3.5–5.2)
ALBUMIN SERPL BCP-MCNC: 2.8 G/DL (ref 3.5–5.2)
ALBUMIN SERPL BCP-MCNC: 2.9 G/DL (ref 3.5–5.2)
ALBUMIN SERPL BCP-MCNC: 3 G/DL (ref 3.5–5.2)
ALBUMIN SERPL BCP-MCNC: 3.1 G/DL (ref 3.5–5.2)
ALBUMIN SERPL BCP-MCNC: 3.2 G/DL (ref 3.5–5.2)
ALBUMIN SERPL BCP-MCNC: 3.3 G/DL
ALBUMIN SERPL BCP-MCNC: 3.3 G/DL (ref 3.5–5.2)
ALBUMIN SERPL BCP-MCNC: 3.5 G/DL (ref 3.5–5.2)
ALBUMIN SERPL BCP-MCNC: 3.6 G/DL
ALLENS TEST: ABNORMAL
ALLENS TEST: NORMAL
ALP SERPL-CCNC: 118 U/L (ref 55–135)
ALP SERPL-CCNC: 122 U/L (ref 55–135)
ALP SERPL-CCNC: 124 U/L (ref 55–135)
ALP SERPL-CCNC: 127 U/L (ref 55–135)
ALP SERPL-CCNC: 127 U/L (ref 55–135)
ALP SERPL-CCNC: 128 U/L (ref 55–135)
ALP SERPL-CCNC: 131 U/L (ref 55–135)
ALP SERPL-CCNC: 132 U/L (ref 55–135)
ALP SERPL-CCNC: 132 U/L (ref 55–135)
ALP SERPL-CCNC: 133 U/L (ref 55–135)
ALP SERPL-CCNC: 134 U/L (ref 55–135)
ALP SERPL-CCNC: 135 U/L (ref 55–135)
ALP SERPL-CCNC: 136 U/L (ref 55–135)
ALP SERPL-CCNC: 140 U/L (ref 55–135)
ALP SERPL-CCNC: 142 U/L (ref 55–135)
ALP SERPL-CCNC: 143 U/L (ref 55–135)
ALP SERPL-CCNC: 144 U/L (ref 55–135)
ALP SERPL-CCNC: 146 U/L (ref 55–135)
ALP SERPL-CCNC: 147 U/L (ref 55–135)
ALP SERPL-CCNC: 148 U/L (ref 55–135)
ALP SERPL-CCNC: 148 U/L (ref 55–135)
ALP SERPL-CCNC: 150 U/L (ref 55–135)
ALP SERPL-CCNC: 150 U/L (ref 55–135)
ALP SERPL-CCNC: 153 U/L (ref 55–135)
ALP SERPL-CCNC: 155 U/L (ref 55–135)
ALP SERPL-CCNC: 156 U/L (ref 55–135)
ALP SERPL-CCNC: 160 U/L (ref 55–135)
ALP SERPL-CCNC: 160 U/L (ref 55–135)
ALP SERPL-CCNC: 161 U/L (ref 55–135)
ALP SERPL-CCNC: 163 U/L (ref 55–135)
ALP SERPL-CCNC: 165 U/L (ref 55–135)
ALP SERPL-CCNC: 166 U/L (ref 55–135)
ALP SERPL-CCNC: 166 U/L (ref 55–135)
ALP SERPL-CCNC: 167 U/L (ref 55–135)
ALP SERPL-CCNC: 170 U/L (ref 55–135)
ALP SERPL-CCNC: 171 U/L (ref 55–135)
ALP SERPL-CCNC: 173 U/L (ref 55–135)
ALP SERPL-CCNC: 173 U/L (ref 55–135)
ALP SERPL-CCNC: 174 U/L (ref 55–135)
ALP SERPL-CCNC: 174 U/L (ref 55–135)
ALP SERPL-CCNC: 181 U/L (ref 55–135)
ALP SERPL-CCNC: 184 U/L (ref 55–135)
ALP SERPL-CCNC: 185 U/L (ref 55–135)
ALP SERPL-CCNC: 189 U/L (ref 55–135)
ALP SERPL-CCNC: 195 U/L (ref 55–135)
ALP SERPL-CCNC: 196 U/L (ref 55–135)
ALP SERPL-CCNC: 197 U/L (ref 55–135)
ALP SERPL-CCNC: 199 U/L
ALP SERPL-CCNC: 199 U/L (ref 55–135)
ALP SERPL-CCNC: 210 U/L (ref 55–135)
ALP SERPL-CCNC: 214 U/L (ref 55–135)
ALP SERPL-CCNC: 216 U/L (ref 55–135)
ALP SERPL-CCNC: 217 U/L (ref 55–135)
ALP SERPL-CCNC: 219 U/L
ALP SERPL-CCNC: 219 U/L (ref 55–135)
ALP SERPL-CCNC: 221 U/L (ref 55–135)
ALP SERPL-CCNC: 225 U/L (ref 55–135)
ALP SERPL-CCNC: 238 U/L (ref 55–135)
ALP SERPL-CCNC: 246 U/L (ref 55–135)
ALP SERPL-CCNC: 249 U/L (ref 55–135)
ALP SERPL-CCNC: 256 U/L (ref 55–135)
ALP SERPL-CCNC: 270 U/L (ref 55–135)
ALP SERPL-CCNC: 277 U/L (ref 55–135)
ALP SERPL-CCNC: 299 U/L (ref 55–135)
ALP SERPL-CCNC: 326 U/L (ref 55–135)
ALP SERPL-CCNC: 367 U/L (ref 55–135)
ALP SERPL-CCNC: 409 U/L (ref 55–135)
ALP SERPL-CCNC: 475 U/L (ref 55–135)
ALP SERPL-CCNC: 488 U/L (ref 55–135)
ALP SERPL-CCNC: 537 U/L (ref 55–135)
ALP SERPL-CCNC: 567 U/L (ref 55–135)
ALP SERPL-CCNC: 567 U/L (ref 55–135)
ALP SERPL-CCNC: 662 U/L (ref 55–135)
ALP SERPL-CCNC: 666 U/L (ref 55–135)
ALT SERPL W/O P-5'-P-CCNC: 10 U/L (ref 10–44)
ALT SERPL W/O P-5'-P-CCNC: 11 U/L (ref 10–44)
ALT SERPL W/O P-5'-P-CCNC: 120 U/L (ref 10–44)
ALT SERPL W/O P-5'-P-CCNC: 126 U/L (ref 10–44)
ALT SERPL W/O P-5'-P-CCNC: 142 U/L (ref 10–44)
ALT SERPL W/O P-5'-P-CCNC: 148 U/L (ref 10–44)
ALT SERPL W/O P-5'-P-CCNC: 17 U/L (ref 10–44)
ALT SERPL W/O P-5'-P-CCNC: 17 U/L (ref 10–44)
ALT SERPL W/O P-5'-P-CCNC: 18 U/L (ref 10–44)
ALT SERPL W/O P-5'-P-CCNC: 18 U/L (ref 10–44)
ALT SERPL W/O P-5'-P-CCNC: 19 U/L (ref 10–44)
ALT SERPL W/O P-5'-P-CCNC: 19 U/L (ref 10–44)
ALT SERPL W/O P-5'-P-CCNC: 20 U/L (ref 10–44)
ALT SERPL W/O P-5'-P-CCNC: 21 U/L (ref 10–44)
ALT SERPL W/O P-5'-P-CCNC: 22 U/L (ref 10–44)
ALT SERPL W/O P-5'-P-CCNC: 22 U/L (ref 10–44)
ALT SERPL W/O P-5'-P-CCNC: 23 U/L (ref 10–44)
ALT SERPL W/O P-5'-P-CCNC: 23 U/L (ref 10–44)
ALT SERPL W/O P-5'-P-CCNC: 24 U/L (ref 10–44)
ALT SERPL W/O P-5'-P-CCNC: 25 U/L (ref 10–44)
ALT SERPL W/O P-5'-P-CCNC: 26 U/L (ref 10–44)
ALT SERPL W/O P-5'-P-CCNC: 269 U/L (ref 10–44)
ALT SERPL W/O P-5'-P-CCNC: 27 U/L (ref 10–44)
ALT SERPL W/O P-5'-P-CCNC: 29 U/L (ref 10–44)
ALT SERPL W/O P-5'-P-CCNC: 30 U/L (ref 10–44)
ALT SERPL W/O P-5'-P-CCNC: 305 U/L (ref 10–44)
ALT SERPL W/O P-5'-P-CCNC: 31 U/L (ref 10–44)
ALT SERPL W/O P-5'-P-CCNC: 34 U/L (ref 10–44)
ALT SERPL W/O P-5'-P-CCNC: 363 U/L (ref 10–44)
ALT SERPL W/O P-5'-P-CCNC: 363 U/L (ref 10–44)
ALT SERPL W/O P-5'-P-CCNC: 37 U/L (ref 10–44)
ALT SERPL W/O P-5'-P-CCNC: 375 U/L (ref 10–44)
ALT SERPL W/O P-5'-P-CCNC: 38 U/L (ref 10–44)
ALT SERPL W/O P-5'-P-CCNC: 396 U/L (ref 10–44)
ALT SERPL W/O P-5'-P-CCNC: 399 U/L (ref 10–44)
ALT SERPL W/O P-5'-P-CCNC: 41 U/L (ref 10–44)
ALT SERPL W/O P-5'-P-CCNC: 41 U/L (ref 10–44)
ALT SERPL W/O P-5'-P-CCNC: 420 U/L (ref 10–44)
ALT SERPL W/O P-5'-P-CCNC: 423 U/L (ref 10–44)
ALT SERPL W/O P-5'-P-CCNC: 427 U/L (ref 10–44)
ALT SERPL W/O P-5'-P-CCNC: 432 U/L (ref 10–44)
ALT SERPL W/O P-5'-P-CCNC: 435 U/L (ref 10–44)
ALT SERPL W/O P-5'-P-CCNC: 442 U/L (ref 10–44)
ALT SERPL W/O P-5'-P-CCNC: 443 U/L (ref 10–44)
ALT SERPL W/O P-5'-P-CCNC: 48 U/L (ref 10–44)
ALT SERPL W/O P-5'-P-CCNC: 516 U/L (ref 10–44)
ALT SERPL W/O P-5'-P-CCNC: 53 U/L (ref 10–44)
ALT SERPL W/O P-5'-P-CCNC: 53 U/L (ref 10–44)
ALT SERPL W/O P-5'-P-CCNC: 534 U/L (ref 10–44)
ALT SERPL W/O P-5'-P-CCNC: 56 U/L (ref 10–44)
ALT SERPL W/O P-5'-P-CCNC: 64 U/L (ref 10–44)
ALT SERPL W/O P-5'-P-CCNC: 64 U/L (ref 10–44)
ALT SERPL W/O P-5'-P-CCNC: 66 U/L
ALT SERPL W/O P-5'-P-CCNC: 7 U/L (ref 10–44)
ALT SERPL W/O P-5'-P-CCNC: 7 U/L (ref 10–44)
ALT SERPL W/O P-5'-P-CCNC: 70 U/L (ref 10–44)
ALT SERPL W/O P-5'-P-CCNC: 71 U/L (ref 10–44)
ALT SERPL W/O P-5'-P-CCNC: 8 U/L (ref 10–44)
ALT SERPL W/O P-5'-P-CCNC: 82 U/L
ALT SERPL W/O P-5'-P-CCNC: 85 U/L (ref 10–44)
ALT SERPL W/O P-5'-P-CCNC: 9 U/L (ref 10–44)
ALT SERPL W/O P-5'-P-CCNC: 97 U/L (ref 10–44)
ALT SERPL W/O P-5'-P-CCNC: 98 U/L (ref 10–44)
ALT SERPL W/O P-5'-P-CCNC: 99 U/L (ref 10–44)
ANA SER QL IF: NORMAL
ANION GAP SERPL CALC-SCNC: 10 MMOL/L (ref 8–16)
ANION GAP SERPL CALC-SCNC: 11 MMOL/L (ref 8–16)
ANION GAP SERPL CALC-SCNC: 12 MMOL/L (ref 8–16)
ANION GAP SERPL CALC-SCNC: 13 MMOL/L (ref 8–16)
ANION GAP SERPL CALC-SCNC: 14 MMOL/L (ref 8–16)
ANION GAP SERPL CALC-SCNC: 15 MMOL/L (ref 8–16)
ANION GAP SERPL CALC-SCNC: 16 MMOL/L (ref 8–16)
ANION GAP SERPL CALC-SCNC: 17 MMOL/L (ref 8–16)
ANION GAP SERPL CALC-SCNC: 18 MMOL/L (ref 8–16)
ANION GAP SERPL CALC-SCNC: 19 MMOL/L (ref 8–16)
ANION GAP SERPL CALC-SCNC: 20 MMOL/L (ref 8–16)
ANION GAP SERPL CALC-SCNC: 5 MMOL/L (ref 8–16)
ANION GAP SERPL CALC-SCNC: 6 MMOL/L (ref 8–16)
ANION GAP SERPL CALC-SCNC: 7 MMOL/L
ANION GAP SERPL CALC-SCNC: 7 MMOL/L (ref 8–16)
ANION GAP SERPL CALC-SCNC: 8 MMOL/L
ANION GAP SERPL CALC-SCNC: 8 MMOL/L (ref 8–16)
ANION GAP SERPL CALC-SCNC: 9 MMOL/L (ref 8–16)
ANISOCYTOSIS BLD QL SMEAR: ABNORMAL
ANISOCYTOSIS BLD QL SMEAR: SLIGHT
APPEARANCE FLD: NORMAL
APTT BLDCRRT: 23.9 SEC (ref 21–32)
APTT BLDCRRT: 24.1 SEC (ref 21–32)
APTT BLDCRRT: 26.9 SEC (ref 21–32)
APTT BLDCRRT: 29.4 SEC (ref 21–32)
APTT BLDCRRT: 29.7 SEC (ref 21–32)
APTT BLDCRRT: 30.6 SEC (ref 21–32)
APTT BLDCRRT: 31.3 SEC (ref 21–32)
APTT BLDCRRT: 33.2 SEC (ref 21–32)
APTT BLDCRRT: 35 SEC (ref 21–32)
APTT BLDCRRT: 40.6 SEC (ref 21–32)
APTT BLDCRRT: 40.9 SEC (ref 21–32)
APTT BLDCRRT: 44 SEC (ref 21–32)
APTT BLDCRRT: 44 SEC (ref 21–32)
APTT BLDCRRT: 45 SEC (ref 21–32)
APTT BLDCRRT: 50.7 SEC (ref 21–32)
ASCENDING AORTA: 2.66 CM
ASCENDING AORTA: 2.9 CM
AST SERPL-CCNC: 1015 U/L (ref 10–40)
AST SERPL-CCNC: 11 U/L (ref 10–40)
AST SERPL-CCNC: 119 U/L (ref 10–40)
AST SERPL-CCNC: 12 U/L (ref 10–40)
AST SERPL-CCNC: 1247 U/L (ref 10–40)
AST SERPL-CCNC: 13 U/L (ref 10–40)
AST SERPL-CCNC: 1337 U/L (ref 10–40)
AST SERPL-CCNC: 1340 U/L (ref 10–40)
AST SERPL-CCNC: 1373 U/L (ref 10–40)
AST SERPL-CCNC: 14 U/L (ref 10–40)
AST SERPL-CCNC: 15 U/L (ref 10–40)
AST SERPL-CCNC: 1586 U/L (ref 10–40)
AST SERPL-CCNC: 1586 U/L (ref 10–40)
AST SERPL-CCNC: 16 U/L (ref 10–40)
AST SERPL-CCNC: 16 U/L (ref 10–40)
AST SERPL-CCNC: 1606 U/L (ref 10–40)
AST SERPL-CCNC: 17 U/L (ref 10–40)
AST SERPL-CCNC: 18 U/L (ref 10–40)
AST SERPL-CCNC: 19 U/L (ref 10–40)
AST SERPL-CCNC: 19 U/L (ref 10–40)
AST SERPL-CCNC: 1951 U/L (ref 10–40)
AST SERPL-CCNC: 20 U/L (ref 10–40)
AST SERPL-CCNC: 21 U/L (ref 10–40)
AST SERPL-CCNC: 22 U/L (ref 10–40)
AST SERPL-CCNC: 2216 U/L (ref 10–40)
AST SERPL-CCNC: 24 U/L (ref 10–40)
AST SERPL-CCNC: 24 U/L (ref 10–40)
AST SERPL-CCNC: 25 U/L (ref 10–40)
AST SERPL-CCNC: 27 U/L
AST SERPL-CCNC: 28 U/L (ref 10–40)
AST SERPL-CCNC: 30 U/L (ref 10–40)
AST SERPL-CCNC: 30 U/L (ref 10–40)
AST SERPL-CCNC: 31 U/L (ref 10–40)
AST SERPL-CCNC: 33 U/L (ref 10–40)
AST SERPL-CCNC: 3439 U/L (ref 10–40)
AST SERPL-CCNC: 3528 U/L (ref 10–40)
AST SERPL-CCNC: 354 U/L (ref 10–40)
AST SERPL-CCNC: 36 U/L (ref 10–40)
AST SERPL-CCNC: 36 U/L (ref 10–40)
AST SERPL-CCNC: 37 U/L (ref 10–40)
AST SERPL-CCNC: 37 U/L (ref 10–40)
AST SERPL-CCNC: 43 U/L (ref 10–40)
AST SERPL-CCNC: 43 U/L (ref 10–40)
AST SERPL-CCNC: 44 U/L (ref 10–40)
AST SERPL-CCNC: 44 U/L (ref 10–40)
AST SERPL-CCNC: 447 U/L (ref 10–40)
AST SERPL-CCNC: 46 U/L (ref 10–40)
AST SERPL-CCNC: 47 U/L (ref 10–40)
AST SERPL-CCNC: 48 U/L
AST SERPL-CCNC: 48 U/L (ref 10–40)
AST SERPL-CCNC: 52 U/L (ref 10–40)
AST SERPL-CCNC: 53 U/L (ref 10–40)
AST SERPL-CCNC: 63 U/L (ref 10–40)
AST SERPL-CCNC: 656 U/L (ref 10–40)
AST SERPL-CCNC: 8 U/L (ref 10–40)
AST SERPL-CCNC: 8 U/L (ref 10–40)
AST SERPL-CCNC: 83 U/L (ref 10–40)
AST SERPL-CCNC: 9 U/L (ref 10–40)
AST SERPL-CCNC: 903 U/L (ref 10–40)
AST SERPL-CCNC: 91 U/L (ref 10–40)
AV INDEX (PROSTH): 0.69
AV MEAN GRADIENT: 2 MMHG
AV PEAK GRADIENT: 5 MMHG
AV VALVE AREA: 2.17 CM2
AV VELOCITY RATIO: 0.73
B CELL RESULTS - XM ALLO: NEGATIVE
B CELL RESULTS - XM AUTO: NEGATIVE
B CELL RESULTS - XM AUTO: POSITIVE
B MCS AVERAGE - XM ALLO: -2.5
B MCS AVERAGE - XM ALLO: 16
B MCS AVERAGE - XM ALLO: 21.5
B MCS AVERAGE - XM AUTO: -35
B MCS AVERAGE - XM AUTO: 99.5
B-OH-BUTYR BLD STRIP-SCNC: 1.8 MMOL/L (ref 0–0.5)
BACTERIA BLD CULT: ABNORMAL
BACTERIA BLD CULT: NORMAL
BACTERIA SPEC AEROBE CULT: ABNORMAL
BACTERIA SPEC AEROBE CULT: NO GROWTH
BACTERIA SPEC AEROBE CULT: NORMAL
BACTERIA SPEC ANAEROBE CULT: NORMAL
BACTERIA UR CULT: NORMAL
BASO STIPL BLD QL SMEAR: ABNORMAL
BASOPHILS # BLD AUTO: 0 K/UL (ref 0–0.2)
BASOPHILS # BLD AUTO: 0.01 K/UL (ref 0–0.2)
BASOPHILS # BLD AUTO: 0.02 K/UL (ref 0–0.2)
BASOPHILS # BLD AUTO: 0.03 K/UL (ref 0–0.2)
BASOPHILS # BLD AUTO: 0.04 K/UL (ref 0–0.2)
BASOPHILS # BLD AUTO: 0.05 K/UL
BASOPHILS # BLD AUTO: 0.05 K/UL (ref 0–0.2)
BASOPHILS # BLD AUTO: 0.06 K/UL (ref 0–0.2)
BASOPHILS # BLD AUTO: 0.07 K/UL
BASOPHILS # BLD AUTO: ABNORMAL K/UL (ref 0–0.2)
BASOPHILS NFR BLD: 0 % (ref 0–1.9)
BASOPHILS NFR BLD: 0.1 % (ref 0–1.9)
BASOPHILS NFR BLD: 0.2 % (ref 0–1.9)
BASOPHILS NFR BLD: 0.3 % (ref 0–1.9)
BASOPHILS NFR BLD: 0.4 % (ref 0–1.9)
BASOPHILS NFR BLD: 0.5 % (ref 0–1.9)
BASOPHILS NFR BLD: 0.6 % (ref 0–1.9)
BASOPHILS NFR BLD: 0.7 %
BASOPHILS NFR BLD: 0.7 % (ref 0–1.9)
BASOPHILS NFR BLD: 0.7 % (ref 0–1.9)
BASOPHILS NFR BLD: 0.8 % (ref 0–1.9)
BASOPHILS NFR BLD: 0.8 % (ref 0–1.9)
BASOPHILS NFR BLD: 0.9 % (ref 0–1.9)
BASOPHILS NFR BLD: 0.9 % (ref 0–1.9)
BASOPHILS NFR BLD: 1 % (ref 0–1.9)
BASOPHILS NFR BLD: 1.1 %
BILIRUB DIRECT SERPL-MCNC: 0.2 MG/DL (ref 0.1–0.3)
BILIRUB DIRECT SERPL-MCNC: 0.3 MG/DL (ref 0.1–0.3)
BILIRUB DIRECT SERPL-MCNC: 0.4 MG/DL (ref 0.1–0.3)
BILIRUB DIRECT SERPL-MCNC: 0.5 MG/DL (ref 0.1–0.3)
BILIRUB DIRECT SERPL-MCNC: 0.7 MG/DL (ref 0.1–0.3)
BILIRUB DIRECT SERPL-MCNC: 0.8 MG/DL (ref 0.1–0.3)
BILIRUB DIRECT SERPL-MCNC: 0.9 MG/DL (ref 0.1–0.3)
BILIRUB DIRECT SERPL-MCNC: 1.1 MG/DL (ref 0.1–0.3)
BILIRUB DIRECT SERPL-MCNC: 1.7 MG/DL (ref 0.1–0.3)
BILIRUB DIRECT SERPL-MCNC: 1.8 MG/DL (ref 0.1–0.3)
BILIRUB DIRECT SERPL-MCNC: 1.8 MG/DL (ref 0.1–0.3)
BILIRUB SERPL-MCNC: 0.1 MG/DL (ref 0.1–1)
BILIRUB SERPL-MCNC: 0.2 MG/DL (ref 0.1–1)
BILIRUB SERPL-MCNC: 0.3 MG/DL
BILIRUB SERPL-MCNC: 0.3 MG/DL
BILIRUB SERPL-MCNC: 0.3 MG/DL (ref 0.1–1)
BILIRUB SERPL-MCNC: 0.4 MG/DL (ref 0.1–1)
BILIRUB SERPL-MCNC: 0.5 MG/DL (ref 0.1–1)
BILIRUB SERPL-MCNC: 0.6 MG/DL (ref 0.1–1)
BILIRUB SERPL-MCNC: 0.6 MG/DL (ref 0.1–1)
BILIRUB SERPL-MCNC: 0.7 MG/DL (ref 0.1–1)
BILIRUB SERPL-MCNC: 0.8 MG/DL (ref 0.1–1)
BILIRUB SERPL-MCNC: 0.9 MG/DL (ref 0.1–1)
BILIRUB SERPL-MCNC: 1 MG/DL (ref 0.1–1)
BILIRUB SERPL-MCNC: 1.2 MG/DL (ref 0.1–1)
BILIRUB SERPL-MCNC: 1.4 MG/DL (ref 0.1–1)
BILIRUB SERPL-MCNC: 2 MG/DL (ref 0.1–1)
BILIRUB SERPL-MCNC: 2.1 MG/DL (ref 0.1–1)
BILIRUB SERPL-MCNC: 2.2 MG/DL (ref 0.1–1)
BILIRUB UR QL STRIP: NEGATIVE
BILIRUB UR QL STRIP: NEGATIVE
BLD GP AB SCN CELLS X3 SERPL QL: NORMAL
BLD PROD TYP BPU: NORMAL
BLOOD GROUP ANTIBODIES SERPL: NORMAL
BLOOD UNIT EXPIRATION DATE: NORMAL
BLOOD UNIT TYPE CODE: 1700
BLOOD UNIT TYPE CODE: 1700
BLOOD UNIT TYPE CODE: 5100
BLOOD UNIT TYPE CODE: 7300
BLOOD UNIT TYPE: NORMAL
BNP SERPL-MCNC: 67 PG/ML (ref 0–99)
BODY FLD TYPE: NORMAL
BORDETELLA PARAPERTUSSIS (IS1001): NOT DETECTED
BORDETELLA PERTUSSIS (PTXP): NOT DETECTED
BSA FOR ECHO PROCEDURE: 1.49 M2
BSA FOR ECHO PROCEDURE: 1.49 M2
BUN SERPL-MCNC: 10 MG/DL (ref 6–20)
BUN SERPL-MCNC: 11 MG/DL (ref 6–20)
BUN SERPL-MCNC: 12 MG/DL (ref 6–20)
BUN SERPL-MCNC: 13 MG/DL (ref 6–20)
BUN SERPL-MCNC: 14 MG/DL (ref 6–20)
BUN SERPL-MCNC: 15 MG/DL (ref 6–20)
BUN SERPL-MCNC: 16 MG/DL (ref 6–20)
BUN SERPL-MCNC: 17 MG/DL (ref 6–20)
BUN SERPL-MCNC: 18 MG/DL (ref 6–20)
BUN SERPL-MCNC: 19 MG/DL
BUN SERPL-MCNC: 19 MG/DL (ref 6–20)
BUN SERPL-MCNC: 19 MG/DL (ref 6–20)
BUN SERPL-MCNC: 2 MG/DL (ref 6–20)
BUN SERPL-MCNC: 20 MG/DL (ref 6–20)
BUN SERPL-MCNC: 21 MG/DL (ref 6–20)
BUN SERPL-MCNC: 22 MG/DL (ref 6–20)
BUN SERPL-MCNC: 22 MG/DL (ref 6–20)
BUN SERPL-MCNC: 23 MG/DL (ref 6–20)
BUN SERPL-MCNC: 24 MG/DL (ref 6–20)
BUN SERPL-MCNC: 25 MG/DL
BUN SERPL-MCNC: 26 MG/DL (ref 6–20)
BUN SERPL-MCNC: 26 MG/DL (ref 6–20)
BUN SERPL-MCNC: 3 MG/DL (ref 6–20)
BUN SERPL-MCNC: 3 MG/DL (ref 6–20)
BUN SERPL-MCNC: 31 MG/DL (ref 6–20)
BUN SERPL-MCNC: 35 MG/DL (ref 6–20)
BUN SERPL-MCNC: 37 MG/DL (ref 6–20)
BUN SERPL-MCNC: 4 MG/DL (ref 6–20)
BUN SERPL-MCNC: 4 MG/DL (ref 6–20)
BUN SERPL-MCNC: 40 MG/DL (ref 6–20)
BUN SERPL-MCNC: 40 MG/DL (ref 6–20)
BUN SERPL-MCNC: 5 MG/DL (ref 6–20)
BUN SERPL-MCNC: 5 MG/DL (ref 6–20)
BUN SERPL-MCNC: 6 MG/DL (ref 6–20)
BUN SERPL-MCNC: 6 MG/DL (ref 6–20)
BUN SERPL-MCNC: 8 MG/DL (ref 6–20)
BUN SERPL-MCNC: 9 MG/DL (ref 6–20)
BURR CELLS BLD QL SMEAR: ABNORMAL
CA-I BLDV-SCNC: 0.74 MMOL/L (ref 1.06–1.42)
CA-I BLDV-SCNC: 0.78 MMOL/L (ref 1.06–1.42)
CA-I BLDV-SCNC: 0.79 MMOL/L (ref 1.06–1.42)
CA-I BLDV-SCNC: 0.83 MMOL/L (ref 1.06–1.42)
CA-I BLDV-SCNC: 0.92 MMOL/L (ref 1.06–1.42)
CA-I BLDV-SCNC: 0.99 MMOL/L (ref 1.06–1.42)
CA-I BLDV-SCNC: 1.03 MMOL/L (ref 1.06–1.42)
CALCIUM SERPL-MCNC: 10.2 MG/DL (ref 8.7–10.5)
CALCIUM SERPL-MCNC: 5.7 MG/DL (ref 8.7–10.5)
CALCIUM SERPL-MCNC: 5.9 MG/DL (ref 8.7–10.5)
CALCIUM SERPL-MCNC: 6 MG/DL (ref 8.7–10.5)
CALCIUM SERPL-MCNC: 6.1 MG/DL (ref 8.7–10.5)
CALCIUM SERPL-MCNC: 6.2 MG/DL (ref 8.7–10.5)
CALCIUM SERPL-MCNC: 6.3 MG/DL (ref 8.7–10.5)
CALCIUM SERPL-MCNC: 6.5 MG/DL (ref 8.7–10.5)
CALCIUM SERPL-MCNC: 6.5 MG/DL (ref 8.7–10.5)
CALCIUM SERPL-MCNC: 6.6 MG/DL (ref 8.7–10.5)
CALCIUM SERPL-MCNC: 6.6 MG/DL (ref 8.7–10.5)
CALCIUM SERPL-MCNC: 6.8 MG/DL (ref 8.7–10.5)
CALCIUM SERPL-MCNC: 6.8 MG/DL (ref 8.7–10.5)
CALCIUM SERPL-MCNC: 7.2 MG/DL (ref 8.7–10.5)
CALCIUM SERPL-MCNC: 7.5 MG/DL (ref 8.7–10.5)
CALCIUM SERPL-MCNC: 7.6 MG/DL (ref 8.7–10.5)
CALCIUM SERPL-MCNC: 7.6 MG/DL (ref 8.7–10.5)
CALCIUM SERPL-MCNC: 7.7 MG/DL (ref 8.7–10.5)
CALCIUM SERPL-MCNC: 7.8 MG/DL (ref 8.7–10.5)
CALCIUM SERPL-MCNC: 7.9 MG/DL (ref 8.7–10.5)
CALCIUM SERPL-MCNC: 8 MG/DL (ref 8.7–10.5)
CALCIUM SERPL-MCNC: 8.1 MG/DL (ref 8.7–10.5)
CALCIUM SERPL-MCNC: 8.2 MG/DL (ref 8.7–10.5)
CALCIUM SERPL-MCNC: 8.3 MG/DL (ref 8.7–10.5)
CALCIUM SERPL-MCNC: 8.4 MG/DL (ref 8.7–10.5)
CALCIUM SERPL-MCNC: 8.5 MG/DL (ref 8.7–10.5)
CALCIUM SERPL-MCNC: 8.6 MG/DL (ref 8.7–10.5)
CALCIUM SERPL-MCNC: 8.7 MG/DL (ref 8.7–10.5)
CALCIUM SERPL-MCNC: 8.8 MG/DL
CALCIUM SERPL-MCNC: 8.8 MG/DL (ref 8.7–10.5)
CALCIUM SERPL-MCNC: 8.9 MG/DL (ref 8.7–10.5)
CALCIUM SERPL-MCNC: 9 MG/DL (ref 8.7–10.5)
CALCIUM SERPL-MCNC: 9 MG/DL (ref 8.7–10.5)
CALCIUM SERPL-MCNC: 9.1 MG/DL (ref 8.7–10.5)
CALCIUM SERPL-MCNC: 9.3 MG/DL (ref 8.7–10.5)
CALCIUM SERPL-MCNC: 9.3 MG/DL (ref 8.7–10.5)
CALCIUM SERPL-MCNC: 9.4 MG/DL
CALCIUM SERPL-MCNC: 9.5 MG/DL (ref 8.7–10.5)
CALCIUM SERPL-MCNC: 9.6 MG/DL (ref 8.7–10.5)
CERULOPLASMIN SERPL-MCNC: 33 MG/DL (ref 15–45)
CHLAMYDIA PNEUMONIAE: NOT DETECTED
CHLORIDE SERPL-SCNC: 100 MMOL/L (ref 95–110)
CHLORIDE SERPL-SCNC: 101 MMOL/L (ref 95–110)
CHLORIDE SERPL-SCNC: 102 MMOL/L (ref 95–110)
CHLORIDE SERPL-SCNC: 103 MMOL/L (ref 95–110)
CHLORIDE SERPL-SCNC: 104 MMOL/L
CHLORIDE SERPL-SCNC: 104 MMOL/L (ref 95–110)
CHLORIDE SERPL-SCNC: 105 MMOL/L (ref 95–110)
CHLORIDE SERPL-SCNC: 106 MMOL/L
CHLORIDE SERPL-SCNC: 106 MMOL/L (ref 95–110)
CHLORIDE SERPL-SCNC: 107 MMOL/L (ref 95–110)
CHLORIDE SERPL-SCNC: 108 MMOL/L (ref 95–110)
CHLORIDE SERPL-SCNC: 109 MMOL/L (ref 95–110)
CHLORIDE SERPL-SCNC: 110 MMOL/L (ref 95–110)
CHLORIDE SERPL-SCNC: 89 MMOL/L (ref 95–110)
CHLORIDE SERPL-SCNC: 89 MMOL/L (ref 95–110)
CHLORIDE SERPL-SCNC: 91 MMOL/L (ref 95–110)
CHLORIDE SERPL-SCNC: 91 MMOL/L (ref 95–110)
CHLORIDE SERPL-SCNC: 93 MMOL/L (ref 95–110)
CHLORIDE SERPL-SCNC: 94 MMOL/L (ref 95–110)
CHLORIDE SERPL-SCNC: 95 MMOL/L (ref 95–110)
CHLORIDE SERPL-SCNC: 96 MMOL/L (ref 95–110)
CHLORIDE SERPL-SCNC: 97 MMOL/L (ref 95–110)
CHLORIDE SERPL-SCNC: 99 MMOL/L (ref 95–110)
CLARITY UR REFRACT.AUTO: CLEAR
CLARITY UR REFRACT.AUTO: CLEAR
CLASS I ANTIBODIES - LUMINEX: NEGATIVE
CLASS I ANTIBODIES - LUMINEX: NEGATIVE
CLASS II ANTIBODY COMMENTS - LUMINEX: NORMAL
CLASS II ANTIBODY COMMENTS - LUMINEX: NORMAL
CMV DNA # SERPL NAA+PROBE: <390 CPY/ML
CMV DNA SERPL NAA+PROBE-ACNC: <227 IU/ML
CMV DNA SERPL NAA+PROBE-ACNC: NORMAL IU/ML
CMV DNA SERPL NAA+PROBE-ACNC: NORMAL IU/ML
CMV DNA SERPL NAA+PROBE-LOG IU: <2.4 LOG IU/ML
CMV DNA SERPL NAA+PROBE-LOG#: <2.6 LOG CPY/ML
CMV GENE MUT DET ISLT: NOT DETECTED
CMV IGG SERPL QL IA: REACTIVE
CO2 SERPL-SCNC: 11 MMOL/L (ref 23–29)
CO2 SERPL-SCNC: 11 MMOL/L (ref 23–29)
CO2 SERPL-SCNC: 13 MMOL/L (ref 23–29)
CO2 SERPL-SCNC: 14 MMOL/L (ref 23–29)
CO2 SERPL-SCNC: 14 MMOL/L (ref 23–29)
CO2 SERPL-SCNC: 16 MMOL/L (ref 23–29)
CO2 SERPL-SCNC: 16 MMOL/L (ref 23–29)
CO2 SERPL-SCNC: 17 MMOL/L (ref 23–29)
CO2 SERPL-SCNC: 18 MMOL/L (ref 23–29)
CO2 SERPL-SCNC: 19 MMOL/L (ref 23–29)
CO2 SERPL-SCNC: 20 MMOL/L
CO2 SERPL-SCNC: 20 MMOL/L (ref 23–29)
CO2 SERPL-SCNC: 21 MMOL/L (ref 23–29)
CO2 SERPL-SCNC: 22 MMOL/L (ref 23–29)
CO2 SERPL-SCNC: 23 MMOL/L (ref 23–29)
CO2 SERPL-SCNC: 24 MMOL/L (ref 23–29)
CO2 SERPL-SCNC: 25 MMOL/L
CO2 SERPL-SCNC: 25 MMOL/L (ref 23–29)
CO2 SERPL-SCNC: 26 MMOL/L (ref 23–29)
CO2 SERPL-SCNC: 27 MMOL/L (ref 23–29)
CO2 SERPL-SCNC: 28 MMOL/L (ref 23–29)
CO2 SERPL-SCNC: 29 MMOL/L (ref 23–29)
CO2 SERPL-SCNC: 29 MMOL/L (ref 23–29)
CO2 SERPL-SCNC: 30 MMOL/L (ref 23–29)
CO2 SERPL-SCNC: 31 MMOL/L (ref 23–29)
CO2 SERPL-SCNC: 32 MMOL/L (ref 23–29)
CODING SYSTEM: NORMAL
COLOR FLD: COLORLESS
COLOR UR AUTO: YELLOW
COLOR UR AUTO: YELLOW
CORONAVIRUS 229E, COMMON COLD VIRUS: NOT DETECTED
CORONAVIRUS HKU1, COMMON COLD VIRUS: NOT DETECTED
CORONAVIRUS NL63, COMMON COLD VIRUS: NOT DETECTED
CORONAVIRUS OC43, COMMON COLD VIRUS: NOT DETECTED
CPRA %: 0
CREAT SERPL-MCNC: 0.5 MG/DL (ref 0.5–1.4)
CREAT SERPL-MCNC: 0.6 MG/DL (ref 0.5–1.4)
CREAT SERPL-MCNC: 0.7 MG/DL (ref 0.5–1.4)
CREAT SERPL-MCNC: 0.8 MG/DL (ref 0.5–1.4)
CREAT SERPL-MCNC: 0.9 MG/DL (ref 0.5–1.4)
CREAT SERPL-MCNC: 1 MG/DL (ref 0.5–1.4)
CREAT SERPL-MCNC: 1.1 MG/DL
CREAT SERPL-MCNC: 1.1 MG/DL
CREAT SERPL-MCNC: 1.1 MG/DL (ref 0.5–1.4)
CREAT SERPL-MCNC: 1.2 MG/DL (ref 0.5–1.4)
CREAT SERPL-MCNC: 1.3 MG/DL (ref 0.5–1.4)
CREAT SERPL-MCNC: 1.3 MG/DL (ref 0.5–1.4)
CREAT SERPL-MCNC: 1.4 MG/DL (ref 0.5–1.4)
CREAT SERPL-MCNC: 1.4 MG/DL (ref 0.5–1.4)
CREAT SERPL-MCNC: 1.5 MG/DL (ref 0.5–1.4)
CREAT SERPL-MCNC: 1.6 MG/DL (ref 0.5–1.4)
CREAT SERPL-MCNC: 1.7 MG/DL (ref 0.5–1.4)
CREAT SERPL-MCNC: 1.8 MG/DL (ref 0.5–1.4)
CREAT SERPL-MCNC: 1.8 MG/DL (ref 0.5–1.4)
CREAT SERPL-MCNC: 2.4 MG/DL (ref 0.5–1.4)
CREAT SERPL-MCNC: 2.7 MG/DL (ref 0.5–1.4)
CREAT SERPL-MCNC: 2.8 MG/DL (ref 0.5–1.4)
CREAT SERPL-MCNC: 3 MG/DL (ref 0.5–1.4)
CREAT SERPL-MCNC: 3 MG/DL (ref 0.5–1.4)
CV ECHO LV RWT: 0.37 CM
CV ECHO LV RWT: 0.45 CM
DACRYOCYTES BLD QL SMEAR: ABNORMAL
DAT IGG-SP REAG RBC-IMP: NORMAL
DELSYS: ABNORMAL
DELSYS: NORMAL
DIFFERENTIAL METHOD: ABNORMAL
DISPENSE STATUS: NORMAL
DOHLE BOD BLD QL SMEAR: PRESENT
DONOR MRN: NORMAL
DOP CALC AO PEAK VEL: 1.14 M/S
DOP CALC AO VTI: 14.41 CM
DOP CALC LVOT AREA: 3.2 CM2
DOP CALC LVOT DIAMETER: 2.01 CM
DOP CALC LVOT PEAK VEL: 0.83 M/S
DOP CALC LVOT STROKE VOLUME: 31.33 CM3
DOP CALCLVOT PEAK VEL VTI: 9.88 CM
E WAVE DECELERATION TIME: 97.33 MSEC
E/A RATIO: 1.84
E/E' RATIO: 9.71 M/S
EBV VCA IGG SER QL IA: POSITIVE
ECHO LV POSTERIOR WALL: 0.79 CM (ref 0.6–1.1)
ECHO LV POSTERIOR WALL: 0.8 CM (ref 0.6–1.1)
ENTEROVIRUS: NOT DETECTED
EOSINOPHIL # BLD AUTO: 0 K/UL (ref 0–0.5)
EOSINOPHIL # BLD AUTO: 0.1 K/UL (ref 0–0.5)
EOSINOPHIL # BLD AUTO: 0.2 K/UL (ref 0–0.5)
EOSINOPHIL # BLD AUTO: 0.3 K/UL
EOSINOPHIL # BLD AUTO: 0.3 K/UL (ref 0–0.5)
EOSINOPHIL # BLD AUTO: 0.4 K/UL
EOSINOPHIL # BLD AUTO: 0.4 K/UL (ref 0–0.5)
EOSINOPHIL # BLD AUTO: ABNORMAL K/UL (ref 0–0.5)
EOSINOPHIL NFR BLD: 0 % (ref 0–8)
EOSINOPHIL NFR BLD: 0.1 % (ref 0–8)
EOSINOPHIL NFR BLD: 0.2 % (ref 0–8)
EOSINOPHIL NFR BLD: 0.3 % (ref 0–8)
EOSINOPHIL NFR BLD: 0.4 % (ref 0–8)
EOSINOPHIL NFR BLD: 0.5 % (ref 0–8)
EOSINOPHIL NFR BLD: 0.6 % (ref 0–8)
EOSINOPHIL NFR BLD: 0.7 % (ref 0–8)
EOSINOPHIL NFR BLD: 0.8 % (ref 0–8)
EOSINOPHIL NFR BLD: 0.9 % (ref 0–8)
EOSINOPHIL NFR BLD: 1 % (ref 0–8)
EOSINOPHIL NFR BLD: 1 % (ref 0–8)
EOSINOPHIL NFR BLD: 1.1 % (ref 0–8)
EOSINOPHIL NFR BLD: 1.1 % (ref 0–8)
EOSINOPHIL NFR BLD: 1.3 % (ref 0–8)
EOSINOPHIL NFR BLD: 1.4 % (ref 0–8)
EOSINOPHIL NFR BLD: 2.6 % (ref 0–8)
EOSINOPHIL NFR BLD: 3.6 % (ref 0–8)
EOSINOPHIL NFR BLD: 3.7 % (ref 0–8)
EOSINOPHIL NFR BLD: 3.8 % (ref 0–8)
EOSINOPHIL NFR BLD: 3.9 % (ref 0–8)
EOSINOPHIL NFR BLD: 4 % (ref 0–8)
EOSINOPHIL NFR BLD: 4.1 % (ref 0–8)
EOSINOPHIL NFR BLD: 4.1 % (ref 0–8)
EOSINOPHIL NFR BLD: 4.2 % (ref 0–8)
EOSINOPHIL NFR BLD: 4.3 % (ref 0–8)
EOSINOPHIL NFR BLD: 4.6 % (ref 0–8)
EOSINOPHIL NFR BLD: 4.9 % (ref 0–8)
EOSINOPHIL NFR BLD: 5 %
EOSINOPHIL NFR BLD: 5 % (ref 0–8)
EOSINOPHIL NFR BLD: 5.1 % (ref 0–8)
EOSINOPHIL NFR BLD: 5.3 %
EOSINOPHIL NFR FLD MANUAL: 1 %
EOSINOPHIL NFR FLD MANUAL: 2 %
EP: 5
ERYTHROCYTE [DISTWIDTH] IN BLOOD BY AUTOMATED COUNT: 14.3 % (ref 11.5–14.5)
ERYTHROCYTE [DISTWIDTH] IN BLOOD BY AUTOMATED COUNT: 14.9 % (ref 11.5–14.5)
ERYTHROCYTE [DISTWIDTH] IN BLOOD BY AUTOMATED COUNT: 14.9 % (ref 11.5–14.5)
ERYTHROCYTE [DISTWIDTH] IN BLOOD BY AUTOMATED COUNT: 15 % (ref 11.5–14.5)
ERYTHROCYTE [DISTWIDTH] IN BLOOD BY AUTOMATED COUNT: 15.3 % (ref 11.5–14.5)
ERYTHROCYTE [DISTWIDTH] IN BLOOD BY AUTOMATED COUNT: 15.4 % (ref 11.5–14.5)
ERYTHROCYTE [DISTWIDTH] IN BLOOD BY AUTOMATED COUNT: 15.4 % (ref 11.5–14.5)
ERYTHROCYTE [DISTWIDTH] IN BLOOD BY AUTOMATED COUNT: 15.5 %
ERYTHROCYTE [DISTWIDTH] IN BLOOD BY AUTOMATED COUNT: 15.6 %
ERYTHROCYTE [DISTWIDTH] IN BLOOD BY AUTOMATED COUNT: 15.6 % (ref 11.5–14.5)
ERYTHROCYTE [DISTWIDTH] IN BLOOD BY AUTOMATED COUNT: 15.7 % (ref 11.5–14.5)
ERYTHROCYTE [DISTWIDTH] IN BLOOD BY AUTOMATED COUNT: 15.8 % (ref 11.5–14.5)
ERYTHROCYTE [DISTWIDTH] IN BLOOD BY AUTOMATED COUNT: 15.9 % (ref 11.5–14.5)
ERYTHROCYTE [DISTWIDTH] IN BLOOD BY AUTOMATED COUNT: 16 % (ref 11.5–14.5)
ERYTHROCYTE [DISTWIDTH] IN BLOOD BY AUTOMATED COUNT: 16.1 % (ref 11.5–14.5)
ERYTHROCYTE [DISTWIDTH] IN BLOOD BY AUTOMATED COUNT: 16.2 % (ref 11.5–14.5)
ERYTHROCYTE [DISTWIDTH] IN BLOOD BY AUTOMATED COUNT: 16.3 % (ref 11.5–14.5)
ERYTHROCYTE [DISTWIDTH] IN BLOOD BY AUTOMATED COUNT: 16.5 % (ref 11.5–14.5)
ERYTHROCYTE [DISTWIDTH] IN BLOOD BY AUTOMATED COUNT: 16.5 % (ref 11.5–14.5)
ERYTHROCYTE [DISTWIDTH] IN BLOOD BY AUTOMATED COUNT: 16.6 % (ref 11.5–14.5)
ERYTHROCYTE [DISTWIDTH] IN BLOOD BY AUTOMATED COUNT: 16.6 % (ref 11.5–14.5)
ERYTHROCYTE [DISTWIDTH] IN BLOOD BY AUTOMATED COUNT: 16.8 % (ref 11.5–14.5)
ERYTHROCYTE [DISTWIDTH] IN BLOOD BY AUTOMATED COUNT: 16.8 % (ref 11.5–14.5)
ERYTHROCYTE [DISTWIDTH] IN BLOOD BY AUTOMATED COUNT: 16.9 % (ref 11.5–14.5)
ERYTHROCYTE [DISTWIDTH] IN BLOOD BY AUTOMATED COUNT: 16.9 % (ref 11.5–14.5)
ERYTHROCYTE [DISTWIDTH] IN BLOOD BY AUTOMATED COUNT: 17 % (ref 11.5–14.5)
ERYTHROCYTE [DISTWIDTH] IN BLOOD BY AUTOMATED COUNT: 17 % (ref 11.5–14.5)
ERYTHROCYTE [DISTWIDTH] IN BLOOD BY AUTOMATED COUNT: 17.1 % (ref 11.5–14.5)
ERYTHROCYTE [DISTWIDTH] IN BLOOD BY AUTOMATED COUNT: 17.2 % (ref 11.5–14.5)
ERYTHROCYTE [DISTWIDTH] IN BLOOD BY AUTOMATED COUNT: 17.3 % (ref 11.5–14.5)
ERYTHROCYTE [DISTWIDTH] IN BLOOD BY AUTOMATED COUNT: 17.3 % (ref 11.5–14.5)
ERYTHROCYTE [DISTWIDTH] IN BLOOD BY AUTOMATED COUNT: 17.5 % (ref 11.5–14.5)
ERYTHROCYTE [DISTWIDTH] IN BLOOD BY AUTOMATED COUNT: 17.5 % (ref 11.5–14.5)
ERYTHROCYTE [DISTWIDTH] IN BLOOD BY AUTOMATED COUNT: 17.7 % (ref 11.5–14.5)
ERYTHROCYTE [DISTWIDTH] IN BLOOD BY AUTOMATED COUNT: 17.9 % (ref 11.5–14.5)
ERYTHROCYTE [DISTWIDTH] IN BLOOD BY AUTOMATED COUNT: 18.6 % (ref 11.5–14.5)
ERYTHROCYTE [DISTWIDTH] IN BLOOD BY AUTOMATED COUNT: 18.8 % (ref 11.5–14.5)
ERYTHROCYTE [DISTWIDTH] IN BLOOD BY AUTOMATED COUNT: 19.1 % (ref 11.5–14.5)
ERYTHROCYTE [DISTWIDTH] IN BLOOD BY AUTOMATED COUNT: 19.3 % (ref 11.5–14.5)
ERYTHROCYTE [DISTWIDTH] IN BLOOD BY AUTOMATED COUNT: 19.7 % (ref 11.5–14.5)
ERYTHROCYTE [DISTWIDTH] IN BLOOD BY AUTOMATED COUNT: 19.8 % (ref 11.5–14.5)
ERYTHROCYTE [DISTWIDTH] IN BLOOD BY AUTOMATED COUNT: 19.8 % (ref 11.5–14.5)
ERYTHROCYTE [DISTWIDTH] IN BLOOD BY AUTOMATED COUNT: 20 % (ref 11.5–14.5)
ERYTHROCYTE [DISTWIDTH] IN BLOOD BY AUTOMATED COUNT: 20.2 % (ref 11.5–14.5)
ERYTHROCYTE [DISTWIDTH] IN BLOOD BY AUTOMATED COUNT: 20.3 % (ref 11.5–14.5)
ERYTHROCYTE [DISTWIDTH] IN BLOOD BY AUTOMATED COUNT: 20.5 % (ref 11.5–14.5)
ERYTHROCYTE [DISTWIDTH] IN BLOOD BY AUTOMATED COUNT: 20.5 % (ref 11.5–14.5)
ERYTHROCYTE [DISTWIDTH] IN BLOOD BY AUTOMATED COUNT: 20.6 % (ref 11.5–14.5)
ERYTHROCYTE [DISTWIDTH] IN BLOOD BY AUTOMATED COUNT: 20.7 % (ref 11.5–14.5)
ERYTHROCYTE [DISTWIDTH] IN BLOOD BY AUTOMATED COUNT: 20.8 % (ref 11.5–14.5)
ERYTHROCYTE [DISTWIDTH] IN BLOOD BY AUTOMATED COUNT: 20.9 % (ref 11.5–14.5)
ERYTHROCYTE [DISTWIDTH] IN BLOOD BY AUTOMATED COUNT: 21 % (ref 11.5–14.5)
ERYTHROCYTE [DISTWIDTH] IN BLOOD BY AUTOMATED COUNT: 21.1 % (ref 11.5–14.5)
ERYTHROCYTE [DISTWIDTH] IN BLOOD BY AUTOMATED COUNT: 21.2 % (ref 11.5–14.5)
ERYTHROCYTE [DISTWIDTH] IN BLOOD BY AUTOMATED COUNT: 21.2 % (ref 11.5–14.5)
ERYTHROCYTE [DISTWIDTH] IN BLOOD BY AUTOMATED COUNT: 21.7 % (ref 11.5–14.5)
ERYTHROCYTE [DISTWIDTH] IN BLOOD BY AUTOMATED COUNT: 21.9 % (ref 11.5–14.5)
ERYTHROCYTE [DISTWIDTH] IN BLOOD BY AUTOMATED COUNT: 22.2 % (ref 11.5–14.5)
ERYTHROCYTE [SEDIMENTATION RATE] IN BLOOD BY WESTERGREN METHOD: 0 MM/H
ERYTHROCYTE [SEDIMENTATION RATE] IN BLOOD BY WESTERGREN METHOD: 14 MM/H
ERYTHROCYTE [SEDIMENTATION RATE] IN BLOOD BY WESTERGREN METHOD: 14 MM/H
ERYTHROCYTE [SEDIMENTATION RATE] IN BLOOD BY WESTERGREN METHOD: 16 MM/H
ERYTHROCYTE [SEDIMENTATION RATE] IN BLOOD BY WESTERGREN METHOD: 16 MM/H
ERYTHROCYTE [SEDIMENTATION RATE] IN BLOOD BY WESTERGREN METHOD: 18 MM/H
ERYTHROCYTE [SEDIMENTATION RATE] IN BLOOD BY WESTERGREN METHOD: 20 MM/H
ERYTHROCYTE [SEDIMENTATION RATE] IN BLOOD BY WESTERGREN METHOD: 24 MM/H
ERYTHROCYTE [SEDIMENTATION RATE] IN BLOOD BY WESTERGREN METHOD: 25 MM/H
ERYTHROCYTE [SEDIMENTATION RATE] IN BLOOD BY WESTERGREN METHOD: 27 MM/H
ERYTHROCYTE [SEDIMENTATION RATE] IN BLOOD BY WESTERGREN METHOD: 35 MM/H
ERYTHROCYTE [SEDIMENTATION RATE] IN BLOOD BY WESTERGREN METHOD: 40 MM/H
EST. GFR  (AFRICAN AMERICAN): 23.1 ML/MIN/1.73 M^2
EST. GFR  (AFRICAN AMERICAN): 23.1 ML/MIN/1.73 M^2
EST. GFR  (AFRICAN AMERICAN): 25.2 ML/MIN/1.73 M^2
EST. GFR  (AFRICAN AMERICAN): 26.3 ML/MIN/1.73 M^2
EST. GFR  (AFRICAN AMERICAN): 30.3 ML/MIN/1.73 M^2
EST. GFR  (AFRICAN AMERICAN): 42.9 ML/MIN/1.73 M^2
EST. GFR  (AFRICAN AMERICAN): 42.9 ML/MIN/1.73 M^2
EST. GFR  (AFRICAN AMERICAN): 46 ML/MIN/1.73 M^2
EST. GFR  (AFRICAN AMERICAN): 49.5 ML/MIN/1.73 M^2
EST. GFR  (AFRICAN AMERICAN): 53.5 ML/MIN/1.73 M^2
EST. GFR  (AFRICAN AMERICAN): 58.2 ML/MIN/1.73 M^2
EST. GFR  (AFRICAN AMERICAN): 58.2 ML/MIN/1.73 M^2
EST. GFR  (AFRICAN AMERICAN): >60 ML/MIN/1.73 M^2
EST. GFR  (NON AFRICAN AMERICAN): 20.1 ML/MIN/1.73 M^2
EST. GFR  (NON AFRICAN AMERICAN): 20.1 ML/MIN/1.73 M^2
EST. GFR  (NON AFRICAN AMERICAN): 21.8 ML/MIN/1.73 M^2
EST. GFR  (NON AFRICAN AMERICAN): 22.8 ML/MIN/1.73 M^2
EST. GFR  (NON AFRICAN AMERICAN): 26.3 ML/MIN/1.73 M^2
EST. GFR  (NON AFRICAN AMERICAN): 37.2 ML/MIN/1.73 M^2
EST. GFR  (NON AFRICAN AMERICAN): 37.2 ML/MIN/1.73 M^2
EST. GFR  (NON AFRICAN AMERICAN): 39.9 ML/MIN/1.73 M^2
EST. GFR  (NON AFRICAN AMERICAN): 42.9 ML/MIN/1.73 M^2
EST. GFR  (NON AFRICAN AMERICAN): 46.4 ML/MIN/1.73 M^2
EST. GFR  (NON AFRICAN AMERICAN): 50.5 ML/MIN/1.73 M^2
EST. GFR  (NON AFRICAN AMERICAN): 50.5 ML/MIN/1.73 M^2
EST. GFR  (NON AFRICAN AMERICAN): 55.2 ML/MIN/1.73 M^2
EST. GFR  (NON AFRICAN AMERICAN): 55.2 ML/MIN/1.73 M^2
EST. GFR  (NON AFRICAN AMERICAN): >60 ML/MIN/1.73 M^2
FIBRINOGEN PPP-MCNC: 155 MG/DL (ref 182–366)
FIBRINOGEN PPP-MCNC: 170 MG/DL (ref 182–366)
FIBRINOGEN PPP-MCNC: 173 MG/DL (ref 182–366)
FIBRINOGEN PPP-MCNC: 197 MG/DL (ref 182–366)
FIBRINOGEN PPP-MCNC: 234 MG/DL (ref 182–366)
FIO2: 100
FIO2: 38
FIO2: 40
FIO2: 50
FIO2: 60
FIO2: 70
FIO2: 80
FIO2: 90
FLOW: 1
FLOW: 10
FLOW: 15
FLOW: 2
FLOW: 3
FLOW: 4
FLOW: 8
FLOW: 8
FLUBV RNA NPH QL NAA+NON-PROBE: NOT DETECTED
FRACTIONAL SHORTENING: 14 % (ref 28–44)
FRACTIONAL SHORTENING: 30 % (ref 28–44)
FUNGUS SPEC CULT: ABNORMAL
FUNGUS SPEC CULT: NORMAL
FXMAL TESTING DATE: NORMAL
FXMAU TESTING DATE: NORMAL
FXMAU TESTING DATE: NORMAL
GALACTOMANNAN AG SPEC-ACNC: 1.41 INDEX
GALACTOMANNAN AG SPEC-ACNC: <0.5 INDEX
GIANT PLATELETS BLD QL SMEAR: PRESENT
GLUCOSE SERPL-MCNC: 100 MG/DL (ref 70–110)
GLUCOSE SERPL-MCNC: 100 MG/DL (ref 70–110)
GLUCOSE SERPL-MCNC: 108 MG/DL (ref 70–110)
GLUCOSE SERPL-MCNC: 108 MG/DL (ref 70–110)
GLUCOSE SERPL-MCNC: 109 MG/DL (ref 70–110)
GLUCOSE SERPL-MCNC: 112 MG/DL (ref 70–110)
GLUCOSE SERPL-MCNC: 114 MG/DL (ref 70–110)
GLUCOSE SERPL-MCNC: 115 MG/DL (ref 70–110)
GLUCOSE SERPL-MCNC: 116 MG/DL (ref 70–110)
GLUCOSE SERPL-MCNC: 117 MG/DL (ref 70–110)
GLUCOSE SERPL-MCNC: 119 MG/DL (ref 70–110)
GLUCOSE SERPL-MCNC: 120 MG/DL (ref 70–110)
GLUCOSE SERPL-MCNC: 120 MG/DL (ref 70–110)
GLUCOSE SERPL-MCNC: 121 MG/DL (ref 70–110)
GLUCOSE SERPL-MCNC: 122 MG/DL (ref 70–110)
GLUCOSE SERPL-MCNC: 123 MG/DL (ref 70–110)
GLUCOSE SERPL-MCNC: 123 MG/DL (ref 70–110)
GLUCOSE SERPL-MCNC: 124 MG/DL (ref 70–110)
GLUCOSE SERPL-MCNC: 127 MG/DL (ref 70–110)
GLUCOSE SERPL-MCNC: 128 MG/DL (ref 70–110)
GLUCOSE SERPL-MCNC: 128 MG/DL (ref 70–110)
GLUCOSE SERPL-MCNC: 130 MG/DL (ref 70–110)
GLUCOSE SERPL-MCNC: 131 MG/DL (ref 70–110)
GLUCOSE SERPL-MCNC: 132 MG/DL (ref 70–110)
GLUCOSE SERPL-MCNC: 132 MG/DL (ref 70–110)
GLUCOSE SERPL-MCNC: 133 MG/DL (ref 70–110)
GLUCOSE SERPL-MCNC: 133 MG/DL (ref 70–110)
GLUCOSE SERPL-MCNC: 134 MG/DL (ref 70–110)
GLUCOSE SERPL-MCNC: 135 MG/DL (ref 70–110)
GLUCOSE SERPL-MCNC: 135 MG/DL (ref 70–110)
GLUCOSE SERPL-MCNC: 137 MG/DL (ref 70–110)
GLUCOSE SERPL-MCNC: 143 MG/DL (ref 70–110)
GLUCOSE SERPL-MCNC: 143 MG/DL (ref 70–110)
GLUCOSE SERPL-MCNC: 144 MG/DL (ref 70–110)
GLUCOSE SERPL-MCNC: 145 MG/DL (ref 70–110)
GLUCOSE SERPL-MCNC: 147 MG/DL (ref 70–110)
GLUCOSE SERPL-MCNC: 149 MG/DL (ref 70–110)
GLUCOSE SERPL-MCNC: 152 MG/DL (ref 70–110)
GLUCOSE SERPL-MCNC: 152 MG/DL (ref 70–110)
GLUCOSE SERPL-MCNC: 155 MG/DL (ref 70–110)
GLUCOSE SERPL-MCNC: 160 MG/DL (ref 70–110)
GLUCOSE SERPL-MCNC: 161 MG/DL (ref 70–110)
GLUCOSE SERPL-MCNC: 163 MG/DL (ref 70–110)
GLUCOSE SERPL-MCNC: 164 MG/DL (ref 70–110)
GLUCOSE SERPL-MCNC: 168 MG/DL (ref 70–110)
GLUCOSE SERPL-MCNC: 169 MG/DL (ref 70–110)
GLUCOSE SERPL-MCNC: 172 MG/DL (ref 70–110)
GLUCOSE SERPL-MCNC: 174 MG/DL (ref 70–110)
GLUCOSE SERPL-MCNC: 175 MG/DL (ref 70–110)
GLUCOSE SERPL-MCNC: 176 MG/DL
GLUCOSE SERPL-MCNC: 178 MG/DL (ref 70–110)
GLUCOSE SERPL-MCNC: 180 MG/DL (ref 70–110)
GLUCOSE SERPL-MCNC: 182 MG/DL (ref 70–110)
GLUCOSE SERPL-MCNC: 183 MG/DL (ref 70–110)
GLUCOSE SERPL-MCNC: 185 MG/DL
GLUCOSE SERPL-MCNC: 187 MG/DL (ref 70–110)
GLUCOSE SERPL-MCNC: 192 MG/DL (ref 70–110)
GLUCOSE SERPL-MCNC: 192 MG/DL (ref 70–110)
GLUCOSE SERPL-MCNC: 198 MG/DL (ref 70–110)
GLUCOSE SERPL-MCNC: 201 MG/DL (ref 70–110)
GLUCOSE SERPL-MCNC: 203 MG/DL (ref 70–110)
GLUCOSE SERPL-MCNC: 203 MG/DL (ref 70–110)
GLUCOSE SERPL-MCNC: 204 MG/DL (ref 70–110)
GLUCOSE SERPL-MCNC: 204 MG/DL (ref 70–110)
GLUCOSE SERPL-MCNC: 209 MG/DL (ref 70–110)
GLUCOSE SERPL-MCNC: 212 MG/DL (ref 70–110)
GLUCOSE SERPL-MCNC: 214 MG/DL (ref 70–110)
GLUCOSE SERPL-MCNC: 214 MG/DL (ref 70–110)
GLUCOSE SERPL-MCNC: 215 MG/DL (ref 70–110)
GLUCOSE SERPL-MCNC: 215 MG/DL (ref 70–110)
GLUCOSE SERPL-MCNC: 217 MG/DL (ref 70–110)
GLUCOSE SERPL-MCNC: 219 MG/DL (ref 70–110)
GLUCOSE SERPL-MCNC: 220 MG/DL (ref 70–110)
GLUCOSE SERPL-MCNC: 229 MG/DL (ref 70–110)
GLUCOSE SERPL-MCNC: 233 MG/DL (ref 70–110)
GLUCOSE SERPL-MCNC: 235 MG/DL (ref 70–110)
GLUCOSE SERPL-MCNC: 250 MG/DL (ref 70–110)
GLUCOSE SERPL-MCNC: 253 MG/DL (ref 70–110)
GLUCOSE SERPL-MCNC: 273 MG/DL (ref 70–110)
GLUCOSE SERPL-MCNC: 314 MG/DL (ref 70–110)
GLUCOSE SERPL-MCNC: 317 MG/DL (ref 70–110)
GLUCOSE SERPL-MCNC: 341 MG/DL (ref 70–110)
GLUCOSE SERPL-MCNC: 391 MG/DL (ref 70–110)
GLUCOSE SERPL-MCNC: 391 MG/DL (ref 70–110)
GLUCOSE SERPL-MCNC: 437 MG/DL (ref 70–110)
GLUCOSE SERPL-MCNC: 462 MG/DL (ref 70–110)
GLUCOSE SERPL-MCNC: 81 MG/DL (ref 70–110)
GLUCOSE SERPL-MCNC: 85 MG/DL (ref 70–110)
GLUCOSE SERPL-MCNC: 87 MG/DL (ref 70–110)
GLUCOSE SERPL-MCNC: 92 MG/DL (ref 70–110)
GLUCOSE SERPL-MCNC: 94 MG/DL (ref 70–110)
GLUCOSE SERPL-MCNC: 94 MG/DL (ref 70–110)
GLUCOSE SERPL-MCNC: 96 MG/DL (ref 70–110)
GLUCOSE SERPL-MCNC: 97 MG/DL (ref 70–110)
GLUCOSE UR QL STRIP: NEGATIVE
GLUCOSE UR QL STRIP: NEGATIVE
GRAM STN SPEC: ABNORMAL
GRAM STN SPEC: NORMAL
HBV DNA SERPL NAA+PROBE-ACNC: <10 IU/ML
HBV DNA SERPL NAA+PROBE-LOG IU: <1 LOG (10) IU/ML
HBV DNA SERPL QL NAA+PROBE: NOT DETECTED
HBV SURFACE AG SERPL QL IA: NEGATIVE
HCO3 UR-SCNC: 13.5 MMOL/L (ref 24–28)
HCO3 UR-SCNC: 14.2 MMOL/L (ref 24–28)
HCO3 UR-SCNC: 16 MMOL/L (ref 24–28)
HCO3 UR-SCNC: 16.7 MMOL/L (ref 24–28)
HCO3 UR-SCNC: 17 MMOL/L (ref 24–28)
HCO3 UR-SCNC: 17.1 MMOL/L (ref 24–28)
HCO3 UR-SCNC: 17.2 MMOL/L (ref 24–28)
HCO3 UR-SCNC: 17.3 MMOL/L (ref 24–28)
HCO3 UR-SCNC: 17.5 MMOL/L (ref 24–28)
HCO3 UR-SCNC: 17.6 MMOL/L (ref 24–28)
HCO3 UR-SCNC: 17.9 MMOL/L (ref 24–28)
HCO3 UR-SCNC: 18.4 MMOL/L (ref 24–28)
HCO3 UR-SCNC: 18.6 MMOL/L (ref 24–28)
HCO3 UR-SCNC: 18.7 MMOL/L (ref 24–28)
HCO3 UR-SCNC: 18.7 MMOL/L (ref 24–28)
HCO3 UR-SCNC: 19 MMOL/L (ref 24–28)
HCO3 UR-SCNC: 19.1 MMOL/L (ref 24–28)
HCO3 UR-SCNC: 19.2 MMOL/L (ref 24–28)
HCO3 UR-SCNC: 19.2 MMOL/L (ref 24–28)
HCO3 UR-SCNC: 19.3 MMOL/L (ref 24–28)
HCO3 UR-SCNC: 19.9 MMOL/L (ref 24–28)
HCO3 UR-SCNC: 20 MMOL/L (ref 24–28)
HCO3 UR-SCNC: 20.1 MMOL/L (ref 24–28)
HCO3 UR-SCNC: 20.5 MMOL/L (ref 24–28)
HCO3 UR-SCNC: 20.5 MMOL/L (ref 24–28)
HCO3 UR-SCNC: 20.8 MMOL/L (ref 24–28)
HCO3 UR-SCNC: 20.9 MMOL/L (ref 24–28)
HCO3 UR-SCNC: 21 MMOL/L (ref 24–28)
HCO3 UR-SCNC: 21.1 MMOL/L (ref 24–28)
HCO3 UR-SCNC: 21.4 MMOL/L (ref 24–28)
HCO3 UR-SCNC: 21.5 MMOL/L (ref 24–28)
HCO3 UR-SCNC: 21.7 MMOL/L (ref 24–28)
HCO3 UR-SCNC: 21.8 MMOL/L (ref 24–28)
HCO3 UR-SCNC: 21.8 MMOL/L (ref 24–28)
HCO3 UR-SCNC: 21.9 MMOL/L (ref 24–28)
HCO3 UR-SCNC: 22.1 MMOL/L (ref 24–28)
HCO3 UR-SCNC: 22.2 MMOL/L (ref 24–28)
HCO3 UR-SCNC: 22.3 MMOL/L (ref 24–28)
HCO3 UR-SCNC: 22.5 MMOL/L (ref 24–28)
HCO3 UR-SCNC: 22.7 MMOL/L (ref 24–28)
HCO3 UR-SCNC: 22.8 MMOL/L (ref 24–28)
HCO3 UR-SCNC: 22.9 MMOL/L (ref 24–28)
HCO3 UR-SCNC: 23 MMOL/L (ref 24–28)
HCO3 UR-SCNC: 23.2 MMOL/L (ref 24–28)
HCO3 UR-SCNC: 23.4 MMOL/L (ref 24–28)
HCO3 UR-SCNC: 23.5 MMOL/L (ref 24–28)
HCO3 UR-SCNC: 23.5 MMOL/L (ref 24–28)
HCO3 UR-SCNC: 23.6 MMOL/L (ref 24–28)
HCO3 UR-SCNC: 23.6 MMOL/L (ref 24–28)
HCO3 UR-SCNC: 23.7 MMOL/L (ref 24–28)
HCO3 UR-SCNC: 23.8 MMOL/L (ref 24–28)
HCO3 UR-SCNC: 23.8 MMOL/L (ref 24–28)
HCO3 UR-SCNC: 23.9 MMOL/L (ref 24–28)
HCO3 UR-SCNC: 24 MMOL/L (ref 24–28)
HCO3 UR-SCNC: 24.4 MMOL/L (ref 24–28)
HCO3 UR-SCNC: 24.6 MMOL/L (ref 24–28)
HCO3 UR-SCNC: 24.7 MMOL/L (ref 24–28)
HCO3 UR-SCNC: 24.7 MMOL/L (ref 24–28)
HCO3 UR-SCNC: 24.8 MMOL/L (ref 24–28)
HCO3 UR-SCNC: 24.8 MMOL/L (ref 24–28)
HCO3 UR-SCNC: 25 MMOL/L (ref 24–28)
HCO3 UR-SCNC: 25 MMOL/L (ref 24–28)
HCO3 UR-SCNC: 25.1 MMOL/L (ref 24–28)
HCO3 UR-SCNC: 25.3 MMOL/L (ref 24–28)
HCO3 UR-SCNC: 25.4 MMOL/L (ref 24–28)
HCO3 UR-SCNC: 25.5 MMOL/L (ref 24–28)
HCO3 UR-SCNC: 25.5 MMOL/L (ref 24–28)
HCO3 UR-SCNC: 25.7 MMOL/L (ref 24–28)
HCO3 UR-SCNC: 26 MMOL/L (ref 24–28)
HCO3 UR-SCNC: 26.4 MMOL/L (ref 24–28)
HCO3 UR-SCNC: 26.5 MMOL/L (ref 24–28)
HCO3 UR-SCNC: 26.9 MMOL/L (ref 24–28)
HCO3 UR-SCNC: 27 MMOL/L (ref 24–28)
HCO3 UR-SCNC: 27.1 MMOL/L (ref 24–28)
HCO3 UR-SCNC: 27.3 MMOL/L (ref 24–28)
HCO3 UR-SCNC: 27.3 MMOL/L (ref 24–28)
HCO3 UR-SCNC: 28.3 MMOL/L (ref 24–28)
HCO3 UR-SCNC: 28.3 MMOL/L (ref 24–28)
HCO3 UR-SCNC: 28.4 MMOL/L (ref 24–28)
HCO3 UR-SCNC: 28.7 MMOL/L (ref 24–28)
HCO3 UR-SCNC: 29 MMOL/L (ref 24–28)
HCO3 UR-SCNC: 29.3 MMOL/L (ref 24–28)
HCO3 UR-SCNC: 29.6 MMOL/L (ref 24–28)
HCO3 UR-SCNC: 30.3 MMOL/L (ref 24–28)
HCO3 UR-SCNC: 30.3 MMOL/L (ref 24–28)
HCO3 UR-SCNC: 30.5 MMOL/L (ref 24–28)
HCT VFR BLD AUTO: 21.1 % (ref 37–48.5)
HCT VFR BLD AUTO: 21.5 % (ref 37–48.5)
HCT VFR BLD AUTO: 21.8 % (ref 37–48.5)
HCT VFR BLD AUTO: 21.9 % (ref 37–48.5)
HCT VFR BLD AUTO: 22 % (ref 37–48.5)
HCT VFR BLD AUTO: 22.5 % (ref 37–48.5)
HCT VFR BLD AUTO: 22.6 % (ref 37–48.5)
HCT VFR BLD AUTO: 23 % (ref 37–48.5)
HCT VFR BLD AUTO: 23.1 % (ref 37–48.5)
HCT VFR BLD AUTO: 23.3 % (ref 37–48.5)
HCT VFR BLD AUTO: 23.4 % (ref 37–48.5)
HCT VFR BLD AUTO: 23.9 % (ref 37–48.5)
HCT VFR BLD AUTO: 24.3 % (ref 37–48.5)
HCT VFR BLD AUTO: 24.4 % (ref 37–48.5)
HCT VFR BLD AUTO: 24.5 % (ref 37–48.5)
HCT VFR BLD AUTO: 24.5 % (ref 37–48.5)
HCT VFR BLD AUTO: 24.6 % (ref 37–48.5)
HCT VFR BLD AUTO: 24.7 % (ref 37–48.5)
HCT VFR BLD AUTO: 24.9 % (ref 37–48.5)
HCT VFR BLD AUTO: 25 % (ref 37–48.5)
HCT VFR BLD AUTO: 25.3 % (ref 37–48.5)
HCT VFR BLD AUTO: 25.5 % (ref 37–48.5)
HCT VFR BLD AUTO: 25.7 % (ref 37–48.5)
HCT VFR BLD AUTO: 26 % (ref 37–48.5)
HCT VFR BLD AUTO: 26.2 % (ref 37–48.5)
HCT VFR BLD AUTO: 26.6 % (ref 37–48.5)
HCT VFR BLD AUTO: 26.6 % (ref 37–48.5)
HCT VFR BLD AUTO: 26.7 % (ref 37–48.5)
HCT VFR BLD AUTO: 26.8 % (ref 37–48.5)
HCT VFR BLD AUTO: 26.9 % (ref 37–48.5)
HCT VFR BLD AUTO: 27 % (ref 37–48.5)
HCT VFR BLD AUTO: 27.2 % (ref 37–48.5)
HCT VFR BLD AUTO: 27.4 % (ref 37–48.5)
HCT VFR BLD AUTO: 27.4 % (ref 37–48.5)
HCT VFR BLD AUTO: 27.5 % (ref 37–48.5)
HCT VFR BLD AUTO: 27.6 % (ref 37–48.5)
HCT VFR BLD AUTO: 27.7 % (ref 37–48.5)
HCT VFR BLD AUTO: 27.7 % (ref 37–48.5)
HCT VFR BLD AUTO: 28.1 % (ref 37–48.5)
HCT VFR BLD AUTO: 28.2 % (ref 37–48.5)
HCT VFR BLD AUTO: 28.3 % (ref 37–48.5)
HCT VFR BLD AUTO: 28.5 % (ref 37–48.5)
HCT VFR BLD AUTO: 28.6 % (ref 37–48.5)
HCT VFR BLD AUTO: 28.6 % (ref 37–48.5)
HCT VFR BLD AUTO: 28.7 % (ref 37–48.5)
HCT VFR BLD AUTO: 28.9 % (ref 37–48.5)
HCT VFR BLD AUTO: 28.9 % (ref 37–48.5)
HCT VFR BLD AUTO: 29 % (ref 37–48.5)
HCT VFR BLD AUTO: 29.1 % (ref 37–48.5)
HCT VFR BLD AUTO: 29.6 % (ref 37–48.5)
HCT VFR BLD AUTO: 29.7 % (ref 37–48.5)
HCT VFR BLD AUTO: 29.8 % (ref 37–48.5)
HCT VFR BLD AUTO: 30.1 % (ref 37–48.5)
HCT VFR BLD AUTO: 30.4 % (ref 37–48.5)
HCT VFR BLD AUTO: 30.6 % (ref 37–48.5)
HCT VFR BLD AUTO: 30.6 % (ref 37–48.5)
HCT VFR BLD AUTO: 30.8 % (ref 37–48.5)
HCT VFR BLD AUTO: 30.9 % (ref 37–48.5)
HCT VFR BLD AUTO: 31.3 % (ref 37–48.5)
HCT VFR BLD AUTO: 31.3 % (ref 37–48.5)
HCT VFR BLD AUTO: 31.9 % (ref 37–48.5)
HCT VFR BLD AUTO: 32 % (ref 37–48.5)
HCT VFR BLD AUTO: 32 % (ref 37–48.5)
HCT VFR BLD AUTO: 32.1 % (ref 37–48.5)
HCT VFR BLD AUTO: 32.3 % (ref 37–48.5)
HCT VFR BLD AUTO: 32.3 % (ref 37–48.5)
HCT VFR BLD AUTO: 32.5 % (ref 37–48.5)
HCT VFR BLD AUTO: 32.7 % (ref 37–48.5)
HCT VFR BLD AUTO: 33.3 % (ref 37–48.5)
HCT VFR BLD AUTO: 33.3 % (ref 37–48.5)
HCT VFR BLD AUTO: 33.6 % (ref 37–48.5)
HCT VFR BLD AUTO: 33.9 %
HCT VFR BLD AUTO: 33.9 % (ref 37–48.5)
HCT VFR BLD AUTO: 33.9 % (ref 37–48.5)
HCT VFR BLD AUTO: 34.1 % (ref 37–48.5)
HCT VFR BLD AUTO: 34.3 % (ref 37–48.5)
HCT VFR BLD AUTO: 34.5 % (ref 37–48.5)
HCT VFR BLD AUTO: 35 % (ref 37–48.5)
HCT VFR BLD AUTO: 37.1 % (ref 37–48.5)
HCT VFR BLD AUTO: 37.1 % (ref 37–48.5)
HCT VFR BLD AUTO: 37.2 % (ref 37–48.5)
HCT VFR BLD AUTO: 37.3 % (ref 37–48.5)
HCT VFR BLD AUTO: 37.7 %
HCT VFR BLD AUTO: 38.2 % (ref 37–48.5)
HCT VFR BLD CALC: 15 %PCV (ref 36–54)
HCT VFR BLD CALC: 17 %PCV (ref 36–54)
HCT VFR BLD CALC: 18 %PCV (ref 36–54)
HCT VFR BLD CALC: 19 %PCV (ref 36–54)
HCT VFR BLD CALC: 20 %PCV (ref 36–54)
HCT VFR BLD CALC: 20 %PCV (ref 36–54)
HCT VFR BLD CALC: 21 %PCV (ref 36–54)
HCT VFR BLD CALC: 22 %PCV (ref 36–54)
HCT VFR BLD CALC: 23 %PCV (ref 36–54)
HCT VFR BLD CALC: 23 %PCV (ref 36–54)
HCT VFR BLD CALC: 24 %PCV (ref 36–54)
HCT VFR BLD CALC: 24 %PCV (ref 36–54)
HCT VFR BLD CALC: 25 %PCV (ref 36–54)
HCT VFR BLD CALC: 25 %PCV (ref 36–54)
HCT VFR BLD CALC: 27 %PCV (ref 36–54)
HCT VFR BLD CALC: 27 %PCV (ref 36–54)
HCT VFR BLD CALC: 28 %PCV (ref 36–54)
HCT VFR BLD CALC: 30 %PCV (ref 36–54)
HCT VFR BLD CALC: <15 %PCV (ref 36–54)
HCV RNA SERPL NAA+PROBE-LOG IU: <1.08 LOG (10) IU/ML
HCV RNA SERPL QL NAA+PROBE: NOT DETECTED IU/ML
HCV RNA SPEC NAA+PROBE-ACNC: <12 IU/ML
HGB BLD-MCNC: 10 G/DL (ref 12–16)
HGB BLD-MCNC: 10.1 G/DL (ref 12–16)
HGB BLD-MCNC: 10.4 G/DL (ref 12–16)
HGB BLD-MCNC: 10.5 G/DL (ref 12–16)
HGB BLD-MCNC: 10.6 G/DL
HGB BLD-MCNC: 10.7 G/DL (ref 12–16)
HGB BLD-MCNC: 10.7 G/DL (ref 12–16)
HGB BLD-MCNC: 10.8 G/DL (ref 12–16)
HGB BLD-MCNC: 10.9 G/DL (ref 12–16)
HGB BLD-MCNC: 11 G/DL (ref 12–16)
HGB BLD-MCNC: 11 G/DL (ref 12–16)
HGB BLD-MCNC: 11.2 G/DL (ref 12–16)
HGB BLD-MCNC: 11.4 G/DL (ref 12–16)
HGB BLD-MCNC: 12 G/DL (ref 12–16)
HGB BLD-MCNC: 12.2 G/DL (ref 12–16)
HGB BLD-MCNC: 6.4 G/DL (ref 12–16)
HGB BLD-MCNC: 6.6 G/DL (ref 12–16)
HGB BLD-MCNC: 6.7 G/DL (ref 12–16)
HGB BLD-MCNC: 7 G/DL (ref 12–16)
HGB BLD-MCNC: 7.2 G/DL (ref 12–16)
HGB BLD-MCNC: 7.3 G/DL (ref 12–16)
HGB BLD-MCNC: 7.4 G/DL (ref 12–16)
HGB BLD-MCNC: 7.4 G/DL (ref 12–16)
HGB BLD-MCNC: 7.5 G/DL (ref 12–16)
HGB BLD-MCNC: 7.5 G/DL (ref 12–16)
HGB BLD-MCNC: 7.6 G/DL (ref 12–16)
HGB BLD-MCNC: 7.7 G/DL (ref 12–16)
HGB BLD-MCNC: 7.8 G/DL (ref 12–16)
HGB BLD-MCNC: 7.9 G/DL (ref 12–16)
HGB BLD-MCNC: 8 G/DL (ref 12–16)
HGB BLD-MCNC: 8.1 G/DL (ref 12–16)
HGB BLD-MCNC: 8.3 G/DL (ref 12–16)
HGB BLD-MCNC: 8.4 G/DL (ref 12–16)
HGB BLD-MCNC: 8.5 G/DL (ref 12–16)
HGB BLD-MCNC: 8.6 G/DL (ref 12–16)
HGB BLD-MCNC: 8.7 G/DL (ref 12–16)
HGB BLD-MCNC: 8.7 G/DL (ref 12–16)
HGB BLD-MCNC: 8.8 G/DL (ref 12–16)
HGB BLD-MCNC: 8.9 G/DL (ref 12–16)
HGB BLD-MCNC: 9 G/DL (ref 12–16)
HGB BLD-MCNC: 9.1 G/DL (ref 12–16)
HGB BLD-MCNC: 9.3 G/DL (ref 12–16)
HGB BLD-MCNC: 9.3 G/DL (ref 12–16)
HGB BLD-MCNC: 9.4 G/DL (ref 12–16)
HGB BLD-MCNC: 9.4 G/DL (ref 12–16)
HGB BLD-MCNC: 9.5 G/DL (ref 12–16)
HGB BLD-MCNC: 9.5 G/DL (ref 12–16)
HGB BLD-MCNC: 9.6 G/DL
HGB BLD-MCNC: 9.6 G/DL (ref 12–16)
HGB BLD-MCNC: 9.7 G/DL (ref 12–16)
HGB BLD-MCNC: 9.8 G/DL (ref 12–16)
HGB BLD-MCNC: 9.8 G/DL (ref 12–16)
HGB UR QL STRIP: NEGATIVE
HGB UR QL STRIP: NEGATIVE
HIV 1+2 AB+HIV1 P24 AG SERPL QL IA: NEGATIVE
HLA AB QL: NEGATIVE
HLA AB SERPL: NEGATIVE
HLA DRB4 1: NORMAL
HLA FLOW CROSSMATCH (ALLO) INTERPRETATION: NORMAL
HLA SSO DNA TYPING CLASS I & II INTERPRETATION: NORMAL
HLA-A 1 SERO. EQUIV: 1
HLA-A 1: NORMAL
HLA-A 2 SERO. EQUIV: 11
HLA-A 2: NORMAL
HLA-B 1 SERO. EQUIV: 65
HLA-B 1: NORMAL
HLA-B 2 SERO. EQUIV: 35
HLA-B 2: NORMAL
HLA-BW 1 SERO. EQUIV: 6
HLA-BW 2 SERO. EQUIV: NORMAL
HLA-C 1: NORMAL
HLA-C 2: NORMAL
HLA-CW 1 SERO. EQUIV: 8
HLA-CW 2 SERO. EQUIV: 16
HLA-DQ 1 SERO. EQUIV: 7
HLA-DQ 2 SERO. EQUIV: 6
HLA-DQB1 1: NORMAL
HLA-DQB1 2: NORMAL
HLA-DRB1 1 SERO. EQUIV: 11
HLA-DRB1 1: NORMAL
HLA-DRB1 2 SERO. EQUIV: 13
HLA-DRB1 2: NORMAL
HLA-DRB3 1: NORMAL
HLA-DRB3 2: NORMAL
HLA-DRB345 1 SERO. EQUIV: 52
HLA-DRB345 2 SERO. EQUIV: NORMAL
HLA-DRB4 2: NORMAL
HLA-DRB5 1: NORMAL
HLA-DRB5 2: NORMAL
HLATY INTERPRETATION: NORMAL
HPIV1 RNA NPH QL NAA+NON-PROBE: NOT DETECTED
HPIV2 RNA NPH QL NAA+NON-PROBE: NOT DETECTED
HPIV3 RNA NPH QL NAA+NON-PROBE: NOT DETECTED
HPIV4 RNA NPH QL NAA+NON-PROBE: NOT DETECTED
HPRA INTERPRETATION: NORMAL
HUMAN BOCAVIRUS: NOT DETECTED
HUMAN CORONAVIRUS, COMMON COLD VIRUS: NOT DETECTED
HUMAN CORONAVIRUS, COMMON COLD VIRUS: POSITIVE
HUMAN METAPNEUMOVIRUS: NOT DETECTED
HYPOCHROMIA BLD QL SMEAR: ABNORMAL
IGG SERPL-MCNC: 1509 MG/DL (ref 650–1600)
IMM GRANULOCYTES # BLD AUTO: 0.01 K/UL
IMM GRANULOCYTES # BLD AUTO: 0.01 K/UL (ref 0–0.04)
IMM GRANULOCYTES # BLD AUTO: 0.02 K/UL (ref 0–0.04)
IMM GRANULOCYTES # BLD AUTO: 0.03 K/UL (ref 0–0.04)
IMM GRANULOCYTES # BLD AUTO: 0.04 K/UL (ref 0–0.04)
IMM GRANULOCYTES # BLD AUTO: 0.05 K/UL
IMM GRANULOCYTES # BLD AUTO: 0.05 K/UL (ref 0–0.04)
IMM GRANULOCYTES # BLD AUTO: 0.06 K/UL (ref 0–0.04)
IMM GRANULOCYTES # BLD AUTO: 0.07 K/UL (ref 0–0.04)
IMM GRANULOCYTES # BLD AUTO: 0.07 K/UL (ref 0–0.04)
IMM GRANULOCYTES # BLD AUTO: 0.08 K/UL (ref 0–0.04)
IMM GRANULOCYTES # BLD AUTO: 0.09 K/UL (ref 0–0.04)
IMM GRANULOCYTES # BLD AUTO: 0.09 K/UL (ref 0–0.04)
IMM GRANULOCYTES # BLD AUTO: 0.1 K/UL (ref 0–0.04)
IMM GRANULOCYTES # BLD AUTO: 0.11 K/UL (ref 0–0.04)
IMM GRANULOCYTES # BLD AUTO: 0.13 K/UL (ref 0–0.04)
IMM GRANULOCYTES # BLD AUTO: 0.13 K/UL (ref 0–0.04)
IMM GRANULOCYTES # BLD AUTO: 0.14 K/UL (ref 0–0.04)
IMM GRANULOCYTES # BLD AUTO: 0.14 K/UL (ref 0–0.04)
IMM GRANULOCYTES # BLD AUTO: 0.15 K/UL (ref 0–0.04)
IMM GRANULOCYTES # BLD AUTO: 0.17 K/UL (ref 0–0.04)
IMM GRANULOCYTES # BLD AUTO: 0.17 K/UL (ref 0–0.04)
IMM GRANULOCYTES # BLD AUTO: 0.19 K/UL (ref 0–0.04)
IMM GRANULOCYTES # BLD AUTO: 0.19 K/UL (ref 0–0.04)
IMM GRANULOCYTES # BLD AUTO: 0.21 K/UL (ref 0–0.04)
IMM GRANULOCYTES # BLD AUTO: 0.21 K/UL (ref 0–0.04)
IMM GRANULOCYTES # BLD AUTO: 0.22 K/UL (ref 0–0.04)
IMM GRANULOCYTES # BLD AUTO: 0.23 K/UL (ref 0–0.04)
IMM GRANULOCYTES # BLD AUTO: 0.24 K/UL (ref 0–0.04)
IMM GRANULOCYTES # BLD AUTO: 0.25 K/UL (ref 0–0.04)
IMM GRANULOCYTES # BLD AUTO: 0.27 K/UL (ref 0–0.04)
IMM GRANULOCYTES # BLD AUTO: 0.3 K/UL (ref 0–0.04)
IMM GRANULOCYTES # BLD AUTO: 0.69 K/UL (ref 0–0.04)
IMM GRANULOCYTES # BLD AUTO: 0.72 K/UL (ref 0–0.04)
IMM GRANULOCYTES # BLD AUTO: 1.05 K/UL (ref 0–0.04)
IMM GRANULOCYTES # BLD AUTO: ABNORMAL K/UL (ref 0–0.04)
IMM GRANULOCYTES NFR BLD AUTO: 0.1 % (ref 0–0.5)
IMM GRANULOCYTES NFR BLD AUTO: 0.1 % (ref 0–0.5)
IMM GRANULOCYTES NFR BLD AUTO: 0.2 %
IMM GRANULOCYTES NFR BLD AUTO: 0.2 % (ref 0–0.5)
IMM GRANULOCYTES NFR BLD AUTO: 0.3 % (ref 0–0.5)
IMM GRANULOCYTES NFR BLD AUTO: 0.4 % (ref 0–0.5)
IMM GRANULOCYTES NFR BLD AUTO: 0.5 % (ref 0–0.5)
IMM GRANULOCYTES NFR BLD AUTO: 0.6 % (ref 0–0.5)
IMM GRANULOCYTES NFR BLD AUTO: 0.7 %
IMM GRANULOCYTES NFR BLD AUTO: 0.7 % (ref 0–0.5)
IMM GRANULOCYTES NFR BLD AUTO: 0.8 % (ref 0–0.5)
IMM GRANULOCYTES NFR BLD AUTO: 0.9 % (ref 0–0.5)
IMM GRANULOCYTES NFR BLD AUTO: 0.9 % (ref 0–0.5)
IMM GRANULOCYTES NFR BLD AUTO: 1 % (ref 0–0.5)
IMM GRANULOCYTES NFR BLD AUTO: 1.1 % (ref 0–0.5)
IMM GRANULOCYTES NFR BLD AUTO: 1.2 % (ref 0–0.5)
IMM GRANULOCYTES NFR BLD AUTO: 1.2 % (ref 0–0.5)
IMM GRANULOCYTES NFR BLD AUTO: 1.3 % (ref 0–0.5)
IMM GRANULOCYTES NFR BLD AUTO: 1.4 % (ref 0–0.5)
IMM GRANULOCYTES NFR BLD AUTO: 1.4 % (ref 0–0.5)
IMM GRANULOCYTES NFR BLD AUTO: 1.6 % (ref 0–0.5)
IMM GRANULOCYTES NFR BLD AUTO: 1.6 % (ref 0–0.5)
IMM GRANULOCYTES NFR BLD AUTO: 1.8 % (ref 0–0.5)
IMM GRANULOCYTES NFR BLD AUTO: 1.8 % (ref 0–0.5)
IMM GRANULOCYTES NFR BLD AUTO: 1.9 % (ref 0–0.5)
IMM GRANULOCYTES NFR BLD AUTO: 2 % (ref 0–0.5)
IMM GRANULOCYTES NFR BLD AUTO: 2.2 % (ref 0–0.5)
IMM GRANULOCYTES NFR BLD AUTO: 2.2 % (ref 0–0.5)
IMM GRANULOCYTES NFR BLD AUTO: 2.4 % (ref 0–0.5)
IMM GRANULOCYTES NFR BLD AUTO: 2.4 % (ref 0–0.5)
IMM GRANULOCYTES NFR BLD AUTO: 2.8 % (ref 0–0.5)
IMM GRANULOCYTES NFR BLD AUTO: 3.3 % (ref 0–0.5)
IMM GRANULOCYTES NFR BLD AUTO: 3.3 % (ref 0–0.5)
IMM GRANULOCYTES NFR BLD AUTO: 3.6 % (ref 0–0.5)
IMM GRANULOCYTES NFR BLD AUTO: 3.7 % (ref 0–0.5)
IMM GRANULOCYTES NFR BLD AUTO: 4.2 % (ref 0–0.5)
IMM GRANULOCYTES NFR BLD AUTO: 4.3 % (ref 0–0.5)
IMM GRANULOCYTES NFR BLD AUTO: ABNORMAL % (ref 0–0.5)
INFLUENZA A (SUBTYPES H1,H1-2009,H3): NOT DETECTED
INFLUENZA A - H1N1-09: NOT DETECTED
INFLUENZA A, MOLECULAR: NEGATIVE
INFLUENZA B, MOLECULAR: NEGATIVE
INR PPP: 0.9 (ref 0.8–1.2)
INR PPP: 1 (ref 0.8–1.2)
INR PPP: 1 (ref 0.8–1.2)
INR PPP: 1.1 (ref 0.8–1.2)
INR PPP: 1.2 (ref 0.8–1.2)
INR PPP: 1.2 (ref 0.8–1.2)
INR PPP: 1.3 (ref 0.8–1.2)
INR PPP: 1.3 (ref 0.8–1.2)
INR PPP: 1.4 (ref 0.8–1.2)
INR PPP: 1.5 (ref 0.8–1.2)
INR PPP: 1.6 (ref 0.8–1.2)
INR PPP: 1.7 (ref 0.8–1.2)
INR PPP: 1.8 (ref 0.8–1.2)
INR PPP: 1.9 (ref 0.8–1.2)
INR PPP: 2.2 (ref 0.8–1.2)
INTERVENTRICULAR SEPTUM: 0.77 CM (ref 0.6–1.1)
INTERVENTRICULAR SEPTUM: 0.8 CM (ref 0.6–1.1)
IP: 10
IP: 18
IP: 20
IP: 22
IT: 0.8
IT: 0.85
IT: 0.9
KETONES UR QL STRIP: NEGATIVE
KETONES UR QL STRIP: NEGATIVE
LA MAJOR: 3.34 CM
LA MAJOR: 4 CM
LA MINOR: 3.38 CM
LA MINOR: 4 CM
LA WIDTH: 2.4 CM
LA WIDTH: 3.6 CM
LACTATE SERPL-SCNC: 0.5 MMOL/L (ref 0.5–2.2)
LACTATE SERPL-SCNC: 0.6 MMOL/L (ref 0.5–2.2)
LACTATE SERPL-SCNC: 0.9 MMOL/L (ref 0.5–2.2)
LACTATE SERPL-SCNC: 0.9 MMOL/L (ref 0.5–2.2)
LACTATE SERPL-SCNC: 1.1 MMOL/L (ref 0.5–2.2)
LACTATE SERPL-SCNC: 1.2 MMOL/L (ref 0.5–2.2)
LACTATE SERPL-SCNC: 1.2 MMOL/L (ref 0.5–2.2)
LACTATE SERPL-SCNC: 1.3 MMOL/L (ref 0.5–2.2)
LACTATE SERPL-SCNC: 1.3 MMOL/L (ref 0.5–2.2)
LACTATE SERPL-SCNC: 1.4 MMOL/L (ref 0.5–2.2)
LACTATE SERPL-SCNC: 1.5 MMOL/L (ref 0.5–2.2)
LACTATE SERPL-SCNC: 1.6 MMOL/L (ref 0.5–2.2)
LACTATE SERPL-SCNC: 1.8 MMOL/L (ref 0.5–2.2)
LACTATE SERPL-SCNC: 2.1 MMOL/L (ref 0.5–2.2)
LACTATE SERPL-SCNC: 3.1 MMOL/L (ref 0.5–2.2)
LACTATE SERPL-SCNC: 3.5 MMOL/L (ref 0.5–2.2)
LDH SERPL L TO P-CCNC: 0.39 MMOL/L (ref 0.5–2.2)
LDH SERPL L TO P-CCNC: 0.79 MMOL/L (ref 0.36–1.25)
LDH SERPL L TO P-CCNC: 0.8 MMOL/L (ref 0.36–1.25)
LDH SERPL L TO P-CCNC: 1.19 MMOL/L (ref 0.36–1.25)
LDH SERPL L TO P-CCNC: 1.47 MMOL/L (ref 0.36–1.25)
LDH SERPL L TO P-CCNC: 1.99 MMOL/L (ref 0.36–1.25)
LDH SERPL L TO P-CCNC: 10.14 MMOL/L (ref 0.36–1.25)
LDH SERPL L TO P-CCNC: 13.53 MMOL/L (ref 0.36–1.25)
LDH SERPL L TO P-CCNC: 15.89 MMOL/L (ref 0.36–1.25)
LDH SERPL L TO P-CCNC: 17.72 MMOL/L (ref 0.36–1.25)
LDH SERPL L TO P-CCNC: 19.36 MMOL/L (ref 0.36–1.25)
LDH SERPL L TO P-CCNC: 2.22 MMOL/L (ref 0.36–1.25)
LDH SERPL L TO P-CCNC: 2.56 MMOL/L (ref 0.36–1.25)
LDH SERPL L TO P-CCNC: 2.77 MMOL/L (ref 0.36–1.25)
LDH SERPL L TO P-CCNC: 2.77 MMOL/L (ref 0.36–1.25)
LDH SERPL L TO P-CCNC: 279 U/L (ref 110–260)
LDH SERPL L TO P-CCNC: 3.53 MMOL/L (ref 0.36–1.25)
LDH SERPL L TO P-CCNC: 3.6 MMOL/L (ref 0.36–1.25)
LDH SERPL L TO P-CCNC: 3.65 MMOL/L (ref 0.36–1.25)
LDH SERPL L TO P-CCNC: 3.81 MMOL/L (ref 0.36–1.25)
LDH SERPL L TO P-CCNC: 3.87 MMOL/L (ref 0.36–1.25)
LDH SERPL L TO P-CCNC: 4.21 MMOL/L (ref 0.36–1.25)
LDH SERPL L TO P-CCNC: 4.29 MMOL/L (ref 0.36–1.25)
LDH SERPL L TO P-CCNC: 4.34 MMOL/L (ref 0.36–1.25)
LDH SERPL L TO P-CCNC: 4.35 MMOL/L (ref 0.36–1.25)
LDH SERPL L TO P-CCNC: 4.8 MMOL/L (ref 0.36–1.25)
LDH SERPL L TO P-CCNC: 4.89 MMOL/L (ref 0.36–1.25)
LDH SERPL L TO P-CCNC: 5 MMOL/L (ref 0.36–1.25)
LDH SERPL L TO P-CCNC: 5.21 MMOL/L (ref 0.36–1.25)
LDH SERPL L TO P-CCNC: 5.42 MMOL/L (ref 0.36–1.25)
LDH SERPL L TO P-CCNC: 6.19 MMOL/L (ref 0.36–1.25)
LDH SERPL L TO P-CCNC: 6.21 MMOL/L (ref 0.36–1.25)
LDH SERPL L TO P-CCNC: 6.33 MMOL/L (ref 0.36–1.25)
LDH SERPL L TO P-CCNC: 6.39 MMOL/L (ref 0.36–1.25)
LDH SERPL L TO P-CCNC: 6.49 MMOL/L (ref 0.36–1.25)
LDH SERPL L TO P-CCNC: 6.53 MMOL/L (ref 0.36–1.25)
LDH SERPL L TO P-CCNC: 6.92 MMOL/L (ref 0.36–1.25)
LDH SERPL L TO P-CCNC: 7.06 MMOL/L (ref 0.36–1.25)
LDH SERPL L TO P-CCNC: 7.09 MMOL/L (ref 0.36–1.25)
LDH SERPL L TO P-CCNC: 7.22 MMOL/L (ref 0.36–1.25)
LDH SERPL L TO P-CCNC: 7.25 MMOL/L (ref 0.36–1.25)
LDH SERPL L TO P-CCNC: 7.37 MMOL/L (ref 0.36–1.25)
LDH SERPL L TO P-CCNC: 7.39 MMOL/L (ref 0.36–1.25)
LDH SERPL L TO P-CCNC: 7.4 MMOL/L (ref 0.36–1.25)
LDH SERPL L TO P-CCNC: 7.47 MMOL/L (ref 0.36–1.25)
LDH SERPL L TO P-CCNC: 7.47 MMOL/L (ref 0.36–1.25)
LDH SERPL L TO P-CCNC: 7.49 MMOL/L (ref 0.36–1.25)
LDH SERPL L TO P-CCNC: 7.5 MMOL/L (ref 0.36–1.25)
LDH SERPL L TO P-CCNC: 7.56 MMOL/L (ref 0.36–1.25)
LDH SERPL L TO P-CCNC: 7.58 MMOL/L (ref 0.36–1.25)
LDH SERPL L TO P-CCNC: 7.6 MMOL/L (ref 0.36–1.25)
LDH SERPL L TO P-CCNC: 7.87 MMOL/L (ref 0.36–1.25)
LDH SERPL L TO P-CCNC: 7.96 MMOL/L (ref 0.36–1.25)
LDH SERPL L TO P-CCNC: 8.61 MMOL/L (ref 0.36–1.25)
LDH SERPL L TO P-CCNC: 8.92 MMOL/L (ref 0.36–1.25)
LEFT ATRIUM SIZE: 2.92 CM
LEFT ATRIUM SIZE: 3.3 CM
LEFT ATRIUM VOLUME INDEX: 11.8 ML/M2
LEFT ATRIUM VOLUME INDEX: 27.4 ML/M2
LEFT ATRIUM VOLUME: 20.01 CM3
LEFT ATRIUM VOLUME: 40.39 CM3
LEFT INTERNAL DIMENSION IN SYSTOLE: 3 CM (ref 2.1–4)
LEFT INTERNAL DIMENSION IN SYSTOLE: 3.04 CM (ref 2.1–4)
LEFT VENTRICLE DIASTOLIC VOLUME INDEX: 30.66 ML/M2
LEFT VENTRICLE DIASTOLIC VOLUME: 51.88 ML
LEFT VENTRICLE MASS INDEX: 44 G/M2
LEFT VENTRICLE MASS INDEX: 71 G/M2
LEFT VENTRICLE SYSTOLIC VOLUME INDEX: 21.4 ML/M2
LEFT VENTRICLE SYSTOLIC VOLUME: 36.16 ML
LEFT VENTRICULAR INTERNAL DIMENSION IN DIASTOLE: 3.53 CM (ref 3.5–6)
LEFT VENTRICULAR INTERNAL DIMENSION IN DIASTOLE: 4.3 CM (ref 3.5–6)
LEFT VENTRICULAR MASS: 105.33 G
LEFT VENTRICULAR MASS: 73.72 G
LEGIONELLA PNEUMOPHILA: NOT DETECTED
LEUKOCYTE ESTERASE UR QL STRIP: NEGATIVE
LEUKOCYTE ESTERASE UR QL STRIP: NEGATIVE
LV LATERAL E/E' RATIO: 11.33 M/S
LV SEPTAL E/E' RATIO: 8.5 M/S
LYMPHOCYTES # BLD AUTO: 0.2 K/UL (ref 1–4.8)
LYMPHOCYTES # BLD AUTO: 0.3 K/UL (ref 1–4.8)
LYMPHOCYTES # BLD AUTO: 0.4 K/UL (ref 1–4.8)
LYMPHOCYTES # BLD AUTO: 0.5 K/UL (ref 1–4.8)
LYMPHOCYTES # BLD AUTO: 0.6 K/UL (ref 1–4.8)
LYMPHOCYTES # BLD AUTO: 0.6 K/UL (ref 1–4.8)
LYMPHOCYTES # BLD AUTO: 0.7 K/UL (ref 1–4.8)
LYMPHOCYTES # BLD AUTO: 0.8 K/UL (ref 1–4.8)
LYMPHOCYTES # BLD AUTO: 0.9 K/UL (ref 1–4.8)
LYMPHOCYTES # BLD AUTO: 1 K/UL (ref 1–4.8)
LYMPHOCYTES # BLD AUTO: 1.4 K/UL (ref 1–4.8)
LYMPHOCYTES # BLD AUTO: 1.6 K/UL (ref 1–4.8)
LYMPHOCYTES # BLD AUTO: 1.7 K/UL (ref 1–4.8)
LYMPHOCYTES # BLD AUTO: 1.8 K/UL (ref 1–4.8)
LYMPHOCYTES # BLD AUTO: 1.9 K/UL (ref 1–4.8)
LYMPHOCYTES # BLD AUTO: 2 K/UL (ref 1–4.8)
LYMPHOCYTES # BLD AUTO: 2 K/UL (ref 1–4.8)
LYMPHOCYTES # BLD AUTO: 2.1 K/UL (ref 1–4.8)
LYMPHOCYTES # BLD AUTO: 2.2 K/UL (ref 1–4.8)
LYMPHOCYTES # BLD AUTO: 2.3 K/UL (ref 1–4.8)
LYMPHOCYTES # BLD AUTO: 2.4 K/UL (ref 1–4.8)
LYMPHOCYTES # BLD AUTO: 2.5 K/UL (ref 1–4.8)
LYMPHOCYTES # BLD AUTO: 2.5 K/UL (ref 1–4.8)
LYMPHOCYTES # BLD AUTO: 2.6 K/UL
LYMPHOCYTES # BLD AUTO: 2.6 K/UL
LYMPHOCYTES # BLD AUTO: 2.8 K/UL (ref 1–4.8)
LYMPHOCYTES # BLD AUTO: ABNORMAL K/UL (ref 1–4.8)
LYMPHOCYTES NFR BLD: 0.5 % (ref 18–48)
LYMPHOCYTES NFR BLD: 1 % (ref 18–48)
LYMPHOCYTES NFR BLD: 1.6 % (ref 18–48)
LYMPHOCYTES NFR BLD: 1.9 % (ref 18–48)
LYMPHOCYTES NFR BLD: 10.3 % (ref 18–48)
LYMPHOCYTES NFR BLD: 10.7 % (ref 18–48)
LYMPHOCYTES NFR BLD: 11.1 % (ref 18–48)
LYMPHOCYTES NFR BLD: 11.2 % (ref 18–48)
LYMPHOCYTES NFR BLD: 12 % (ref 18–48)
LYMPHOCYTES NFR BLD: 12.6 % (ref 18–48)
LYMPHOCYTES NFR BLD: 12.6 % (ref 18–48)
LYMPHOCYTES NFR BLD: 13 % (ref 18–48)
LYMPHOCYTES NFR BLD: 13 % (ref 18–48)
LYMPHOCYTES NFR BLD: 13.5 % (ref 18–48)
LYMPHOCYTES NFR BLD: 14 % (ref 18–48)
LYMPHOCYTES NFR BLD: 14 % (ref 18–48)
LYMPHOCYTES NFR BLD: 14.4 % (ref 18–48)
LYMPHOCYTES NFR BLD: 14.6 % (ref 18–48)
LYMPHOCYTES NFR BLD: 15 % (ref 18–48)
LYMPHOCYTES NFR BLD: 16 % (ref 18–48)
LYMPHOCYTES NFR BLD: 16.2 % (ref 18–48)
LYMPHOCYTES NFR BLD: 16.9 % (ref 18–48)
LYMPHOCYTES NFR BLD: 17 % (ref 18–48)
LYMPHOCYTES NFR BLD: 17 % (ref 18–48)
LYMPHOCYTES NFR BLD: 18.2 % (ref 18–48)
LYMPHOCYTES NFR BLD: 19.4 % (ref 18–48)
LYMPHOCYTES NFR BLD: 19.7 % (ref 18–48)
LYMPHOCYTES NFR BLD: 2.3 % (ref 18–48)
LYMPHOCYTES NFR BLD: 2.4 % (ref 18–48)
LYMPHOCYTES NFR BLD: 2.6 % (ref 18–48)
LYMPHOCYTES NFR BLD: 2.7 % (ref 18–48)
LYMPHOCYTES NFR BLD: 2.9 % (ref 18–48)
LYMPHOCYTES NFR BLD: 2.9 % (ref 18–48)
LYMPHOCYTES NFR BLD: 22.7 % (ref 18–48)
LYMPHOCYTES NFR BLD: 23 % (ref 18–48)
LYMPHOCYTES NFR BLD: 23.3 % (ref 18–48)
LYMPHOCYTES NFR BLD: 23.8 % (ref 18–48)
LYMPHOCYTES NFR BLD: 24.6 % (ref 18–48)
LYMPHOCYTES NFR BLD: 25.8 % (ref 18–48)
LYMPHOCYTES NFR BLD: 26 % (ref 18–48)
LYMPHOCYTES NFR BLD: 28.5 % (ref 18–48)
LYMPHOCYTES NFR BLD: 28.6 % (ref 18–48)
LYMPHOCYTES NFR BLD: 29.2 % (ref 18–48)
LYMPHOCYTES NFR BLD: 3 % (ref 18–48)
LYMPHOCYTES NFR BLD: 3.2 % (ref 18–48)
LYMPHOCYTES NFR BLD: 3.3 % (ref 18–48)
LYMPHOCYTES NFR BLD: 3.3 % (ref 18–48)
LYMPHOCYTES NFR BLD: 3.4 % (ref 18–48)
LYMPHOCYTES NFR BLD: 3.5 % (ref 18–48)
LYMPHOCYTES NFR BLD: 3.6 % (ref 18–48)
LYMPHOCYTES NFR BLD: 3.8 % (ref 18–48)
LYMPHOCYTES NFR BLD: 3.8 % (ref 18–48)
LYMPHOCYTES NFR BLD: 3.9 % (ref 18–48)
LYMPHOCYTES NFR BLD: 30.5 % (ref 18–48)
LYMPHOCYTES NFR BLD: 30.6 % (ref 18–48)
LYMPHOCYTES NFR BLD: 32.7 % (ref 18–48)
LYMPHOCYTES NFR BLD: 33.6 % (ref 18–48)
LYMPHOCYTES NFR BLD: 34.2 % (ref 18–48)
LYMPHOCYTES NFR BLD: 35.8 %
LYMPHOCYTES NFR BLD: 35.8 % (ref 18–48)
LYMPHOCYTES NFR BLD: 37.3 % (ref 18–48)
LYMPHOCYTES NFR BLD: 4.2 % (ref 18–48)
LYMPHOCYTES NFR BLD: 4.2 % (ref 18–48)
LYMPHOCYTES NFR BLD: 4.3 % (ref 18–48)
LYMPHOCYTES NFR BLD: 4.6 % (ref 18–48)
LYMPHOCYTES NFR BLD: 4.8 % (ref 18–48)
LYMPHOCYTES NFR BLD: 41.1 %
LYMPHOCYTES NFR BLD: 5 % (ref 18–48)
LYMPHOCYTES NFR BLD: 5 % (ref 18–48)
LYMPHOCYTES NFR BLD: 5.4 % (ref 18–48)
LYMPHOCYTES NFR BLD: 5.5 % (ref 18–48)
LYMPHOCYTES NFR BLD: 5.5 % (ref 18–48)
LYMPHOCYTES NFR BLD: 5.6 % (ref 18–48)
LYMPHOCYTES NFR BLD: 5.8 % (ref 18–48)
LYMPHOCYTES NFR BLD: 6 % (ref 18–48)
LYMPHOCYTES NFR BLD: 6.6 % (ref 18–48)
LYMPHOCYTES NFR BLD: 6.9 % (ref 18–48)
LYMPHOCYTES NFR BLD: 7 % (ref 18–48)
LYMPHOCYTES NFR BLD: 7.4 % (ref 18–48)
LYMPHOCYTES NFR BLD: 7.7 % (ref 18–48)
LYMPHOCYTES NFR BLD: 7.9 % (ref 18–48)
LYMPHOCYTES NFR BLD: 8 % (ref 18–48)
LYMPHOCYTES NFR BLD: 8.6 % (ref 18–48)
LYMPHOCYTES NFR BLD: 8.9 % (ref 18–48)
LYMPHOCYTES NFR BLD: 9.4 % (ref 18–48)
LYMPHOCYTES NFR FLD MANUAL: 1 %
LYMPHOCYTES NFR FLD MANUAL: 3 %
LYMPHOCYTES NFR FLD MANUAL: 4 %
MAGNESIUM SERPL-MCNC: 1.3 MG/DL (ref 1.6–2.6)
MAGNESIUM SERPL-MCNC: 1.4 MG/DL (ref 1.6–2.6)
MAGNESIUM SERPL-MCNC: 1.5 MG/DL (ref 1.6–2.6)
MAGNESIUM SERPL-MCNC: 1.6 MG/DL (ref 1.6–2.6)
MAGNESIUM SERPL-MCNC: 1.6 MG/DL (ref 1.6–2.6)
MAGNESIUM SERPL-MCNC: 1.7 MG/DL (ref 1.6–2.6)
MAGNESIUM SERPL-MCNC: 1.8 MG/DL (ref 1.6–2.6)
MAGNESIUM SERPL-MCNC: 1.9 MG/DL (ref 1.6–2.6)
MAGNESIUM SERPL-MCNC: 2 MG/DL (ref 1.6–2.6)
MAGNESIUM SERPL-MCNC: 2.1 MG/DL (ref 1.6–2.6)
MAGNESIUM SERPL-MCNC: 2.2 MG/DL (ref 1.6–2.6)
MAGNESIUM SERPL-MCNC: 2.3 MG/DL (ref 1.6–2.6)
MAGNESIUM SERPL-MCNC: 2.3 MG/DL (ref 1.6–2.6)
MAGNESIUM SERPL-MCNC: 2.4 MG/DL (ref 1.6–2.6)
MAGNESIUM SERPL-MCNC: 2.4 MG/DL (ref 1.6–2.6)
MAGNESIUM SERPL-MCNC: 2.5 MG/DL (ref 1.6–2.6)
MAGNESIUM SERPL-MCNC: 2.6 MG/DL (ref 1.6–2.6)
MAGNESIUM SERPL-MCNC: 2.9 MG/DL (ref 1.6–2.6)
MAGNESIUM SERPL-MCNC: 3 MG/DL (ref 1.6–2.6)
MAP: 10
MAP: 12
MAP: 13
MAP: 14
MAP: 14
MAP: 15
MAP: 17
MAP: 22
MAP: 6.5
MCH RBC QN AUTO: 23.5 PG (ref 27–31)
MCH RBC QN AUTO: 24 PG (ref 27–31)
MCH RBC QN AUTO: 24.1 PG (ref 27–31)
MCH RBC QN AUTO: 25.1 PG (ref 27–31)
MCH RBC QN AUTO: 25.4 PG (ref 27–31)
MCH RBC QN AUTO: 25.5 PG (ref 27–31)
MCH RBC QN AUTO: 25.5 PG (ref 27–31)
MCH RBC QN AUTO: 25.6 PG (ref 27–31)
MCH RBC QN AUTO: 25.6 PG (ref 27–31)
MCH RBC QN AUTO: 25.7 PG (ref 27–31)
MCH RBC QN AUTO: 25.8 PG (ref 27–31)
MCH RBC QN AUTO: 25.8 PG (ref 27–31)
MCH RBC QN AUTO: 25.9 PG
MCH RBC QN AUTO: 25.9 PG (ref 27–31)
MCH RBC QN AUTO: 26 PG
MCH RBC QN AUTO: 26 PG (ref 27–31)
MCH RBC QN AUTO: 26.2 PG (ref 27–31)
MCH RBC QN AUTO: 27 PG (ref 27–31)
MCH RBC QN AUTO: 27.3 PG (ref 27–31)
MCH RBC QN AUTO: 27.4 PG (ref 27–31)
MCH RBC QN AUTO: 27.5 PG (ref 27–31)
MCH RBC QN AUTO: 27.6 PG (ref 27–31)
MCH RBC QN AUTO: 27.8 PG (ref 27–31)
MCH RBC QN AUTO: 27.9 PG (ref 27–31)
MCH RBC QN AUTO: 28 PG (ref 27–31)
MCH RBC QN AUTO: 28 PG (ref 27–31)
MCH RBC QN AUTO: 28.1 PG (ref 27–31)
MCH RBC QN AUTO: 28.2 PG (ref 27–31)
MCH RBC QN AUTO: 28.3 PG (ref 27–31)
MCH RBC QN AUTO: 28.4 PG (ref 27–31)
MCH RBC QN AUTO: 28.5 PG (ref 27–31)
MCH RBC QN AUTO: 28.5 PG (ref 27–31)
MCH RBC QN AUTO: 28.7 PG (ref 27–31)
MCH RBC QN AUTO: 28.8 PG (ref 27–31)
MCH RBC QN AUTO: 28.9 PG (ref 27–31)
MCH RBC QN AUTO: 28.9 PG (ref 27–31)
MCH RBC QN AUTO: 29 PG (ref 27–31)
MCH RBC QN AUTO: 29.2 PG (ref 27–31)
MCH RBC QN AUTO: 29.3 PG (ref 27–31)
MCH RBC QN AUTO: 29.4 PG (ref 27–31)
MCH RBC QN AUTO: 29.5 PG (ref 27–31)
MCH RBC QN AUTO: 29.6 PG (ref 27–31)
MCH RBC QN AUTO: 30.2 PG (ref 27–31)
MCH RBC QN AUTO: 30.3 PG (ref 27–31)
MCH RBC QN AUTO: 30.3 PG (ref 27–31)
MCH RBC QN AUTO: 30.4 PG (ref 27–31)
MCH RBC QN AUTO: 30.6 PG (ref 27–31)
MCH RBC QN AUTO: 30.7 PG (ref 27–31)
MCH RBC QN AUTO: 30.7 PG (ref 27–31)
MCH RBC QN AUTO: 31.2 PG (ref 27–31)
MCH RBC QN AUTO: 31.3 PG (ref 27–31)
MCH RBC QN AUTO: 31.6 PG (ref 27–31)
MCH RBC QN AUTO: 31.6 PG (ref 27–31)
MCH RBC QN AUTO: 31.8 PG (ref 27–31)
MCH RBC QN AUTO: 31.9 PG (ref 27–31)
MCH RBC QN AUTO: 32.2 PG (ref 27–31)
MCH RBC QN AUTO: 32.7 PG (ref 27–31)
MCH RBC QN AUTO: 33.6 PG (ref 27–31)
MCHC RBC AUTO-ENTMCNC: 27.7 G/DL (ref 32–36)
MCHC RBC AUTO-ENTMCNC: 27.9 G/DL (ref 32–36)
MCHC RBC AUTO-ENTMCNC: 28.1 G/DL
MCHC RBC AUTO-ENTMCNC: 28.1 G/DL (ref 32–36)
MCHC RBC AUTO-ENTMCNC: 28.1 G/DL (ref 32–36)
MCHC RBC AUTO-ENTMCNC: 28.3 G/DL
MCHC RBC AUTO-ENTMCNC: 28.3 G/DL (ref 32–36)
MCHC RBC AUTO-ENTMCNC: 28.4 G/DL (ref 32–36)
MCHC RBC AUTO-ENTMCNC: 28.5 G/DL (ref 32–36)
MCHC RBC AUTO-ENTMCNC: 28.6 G/DL (ref 32–36)
MCHC RBC AUTO-ENTMCNC: 28.8 G/DL (ref 32–36)
MCHC RBC AUTO-ENTMCNC: 28.9 G/DL (ref 32–36)
MCHC RBC AUTO-ENTMCNC: 29 G/DL (ref 32–36)
MCHC RBC AUTO-ENTMCNC: 29.1 G/DL (ref 32–36)
MCHC RBC AUTO-ENTMCNC: 29.1 G/DL (ref 32–36)
MCHC RBC AUTO-ENTMCNC: 29.2 G/DL (ref 32–36)
MCHC RBC AUTO-ENTMCNC: 29.4 G/DL (ref 32–36)
MCHC RBC AUTO-ENTMCNC: 29.5 G/DL (ref 32–36)
MCHC RBC AUTO-ENTMCNC: 29.6 G/DL (ref 32–36)
MCHC RBC AUTO-ENTMCNC: 29.7 G/DL (ref 32–36)
MCHC RBC AUTO-ENTMCNC: 29.7 G/DL (ref 32–36)
MCHC RBC AUTO-ENTMCNC: 29.8 G/DL (ref 32–36)
MCHC RBC AUTO-ENTMCNC: 29.9 G/DL (ref 32–36)
MCHC RBC AUTO-ENTMCNC: 30 G/DL (ref 32–36)
MCHC RBC AUTO-ENTMCNC: 30.1 G/DL (ref 32–36)
MCHC RBC AUTO-ENTMCNC: 30.3 G/DL (ref 32–36)
MCHC RBC AUTO-ENTMCNC: 30.3 G/DL (ref 32–36)
MCHC RBC AUTO-ENTMCNC: 30.4 G/DL (ref 32–36)
MCHC RBC AUTO-ENTMCNC: 30.5 G/DL (ref 32–36)
MCHC RBC AUTO-ENTMCNC: 30.6 G/DL (ref 32–36)
MCHC RBC AUTO-ENTMCNC: 30.7 G/DL (ref 32–36)
MCHC RBC AUTO-ENTMCNC: 30.9 G/DL (ref 32–36)
MCHC RBC AUTO-ENTMCNC: 31 G/DL (ref 32–36)
MCHC RBC AUTO-ENTMCNC: 31 G/DL (ref 32–36)
MCHC RBC AUTO-ENTMCNC: 31.1 G/DL (ref 32–36)
MCHC RBC AUTO-ENTMCNC: 31.2 G/DL (ref 32–36)
MCHC RBC AUTO-ENTMCNC: 31.2 G/DL (ref 32–36)
MCHC RBC AUTO-ENTMCNC: 31.3 G/DL (ref 32–36)
MCHC RBC AUTO-ENTMCNC: 31.4 G/DL (ref 32–36)
MCHC RBC AUTO-ENTMCNC: 31.6 G/DL (ref 32–36)
MCHC RBC AUTO-ENTMCNC: 31.7 G/DL (ref 32–36)
MCHC RBC AUTO-ENTMCNC: 31.7 G/DL (ref 32–36)
MCHC RBC AUTO-ENTMCNC: 31.8 G/DL (ref 32–36)
MCHC RBC AUTO-ENTMCNC: 32 G/DL (ref 32–36)
MCHC RBC AUTO-ENTMCNC: 32 G/DL (ref 32–36)
MCHC RBC AUTO-ENTMCNC: 32.2 G/DL (ref 32–36)
MCHC RBC AUTO-ENTMCNC: 32.3 G/DL (ref 32–36)
MCHC RBC AUTO-ENTMCNC: 32.5 G/DL (ref 32–36)
MCHC RBC AUTO-ENTMCNC: 32.6 G/DL (ref 32–36)
MCHC RBC AUTO-ENTMCNC: 32.7 G/DL (ref 32–36)
MCHC RBC AUTO-ENTMCNC: 32.9 G/DL (ref 32–36)
MCHC RBC AUTO-ENTMCNC: 33 G/DL (ref 32–36)
MCHC RBC AUTO-ENTMCNC: 33.2 G/DL (ref 32–36)
MCHC RBC AUTO-ENTMCNC: 33.2 G/DL (ref 32–36)
MCHC RBC AUTO-ENTMCNC: 33.3 G/DL (ref 32–36)
MCHC RBC AUTO-ENTMCNC: 33.6 G/DL (ref 32–36)
MCHC RBC AUTO-ENTMCNC: 33.6 G/DL (ref 32–36)
MCHC RBC AUTO-ENTMCNC: 33.7 G/DL (ref 32–36)
MCHC RBC AUTO-ENTMCNC: 34.1 G/DL (ref 32–36)
MCHC RBC AUTO-ENTMCNC: 34.2 G/DL (ref 32–36)
MCV RBC AUTO: 100 FL (ref 82–98)
MCV RBC AUTO: 101 FL (ref 82–98)
MCV RBC AUTO: 102 FL (ref 82–98)
MCV RBC AUTO: 102 FL (ref 82–98)
MCV RBC AUTO: 103 FL (ref 82–98)
MCV RBC AUTO: 104 FL (ref 82–98)
MCV RBC AUTO: 105 FL (ref 82–98)
MCV RBC AUTO: 105 FL (ref 82–98)
MCV RBC AUTO: 106 FL (ref 82–98)
MCV RBC AUTO: 81 FL (ref 82–98)
MCV RBC AUTO: 82 FL (ref 82–98)
MCV RBC AUTO: 83 FL (ref 82–98)
MCV RBC AUTO: 84 FL (ref 82–98)
MCV RBC AUTO: 85 FL (ref 82–98)
MCV RBC AUTO: 86 FL (ref 82–98)
MCV RBC AUTO: 87 FL (ref 82–98)
MCV RBC AUTO: 88 FL (ref 82–98)
MCV RBC AUTO: 89 FL (ref 82–98)
MCV RBC AUTO: 90 FL (ref 82–98)
MCV RBC AUTO: 91 FL (ref 82–98)
MCV RBC AUTO: 92 FL
MCV RBC AUTO: 92 FL
MCV RBC AUTO: 92 FL (ref 82–98)
MCV RBC AUTO: 92 FL (ref 82–98)
MCV RBC AUTO: 93 FL (ref 82–98)
MCV RBC AUTO: 94 FL (ref 82–98)
MCV RBC AUTO: 95 FL (ref 82–98)
MCV RBC AUTO: 96 FL (ref 82–98)
MCV RBC AUTO: 97 FL (ref 82–98)
MCV RBC AUTO: 98 FL (ref 82–98)
MCV RBC AUTO: 99 FL (ref 82–98)
MCV RBC AUTO: 99 FL (ref 82–98)
METAMYELOCYTES NFR BLD MANUAL: 0.5 %
METAMYELOCYTES NFR BLD MANUAL: 1 %
METAMYELOCYTES NFR BLD MANUAL: 2 %
METAMYELOCYTES NFR BLD MANUAL: 2.5 %
METAMYELOCYTES NFR BLD MANUAL: 3 %
METAMYELOCYTES NFR BLD MANUAL: 3 %
METAMYELOCYTES NFR BLD MANUAL: 4 %
METAMYELOCYTES NFR BLD MANUAL: 5 %
MIN VOL: 10.1
MIN VOL: 10.2
MIN VOL: 12.9
MIN VOL: 7.9
MIN VOL: 7.9
MIN VOL: 8
MIN VOL: 8.7
MIN VOL: 9
MIN VOL: 9
MITOCHONDRIA AB TITR SER IF: NORMAL {TITER}
MODE: ABNORMAL
MODE: NORMAL
MODE: NORMAL
MONOCYTES # BLD AUTO: 0.1 K/UL (ref 0.3–1)
MONOCYTES # BLD AUTO: 0.2 K/UL (ref 0.3–1)
MONOCYTES # BLD AUTO: 0.3 K/UL (ref 0.3–1)
MONOCYTES # BLD AUTO: 0.4 K/UL
MONOCYTES # BLD AUTO: 0.4 K/UL (ref 0.3–1)
MONOCYTES # BLD AUTO: 0.5 K/UL
MONOCYTES # BLD AUTO: 0.5 K/UL (ref 0.3–1)
MONOCYTES # BLD AUTO: 0.6 K/UL (ref 0.3–1)
MONOCYTES # BLD AUTO: 0.7 K/UL (ref 0.3–1)
MONOCYTES # BLD AUTO: 0.8 K/UL (ref 0.3–1)
MONOCYTES # BLD AUTO: ABNORMAL K/UL (ref 0.3–1)
MONOCYTES NFR BLD: 0 % (ref 4–15)
MONOCYTES NFR BLD: 0.5 % (ref 4–15)
MONOCYTES NFR BLD: 1 % (ref 4–15)
MONOCYTES NFR BLD: 1 % (ref 4–15)
MONOCYTES NFR BLD: 1.2 % (ref 4–15)
MONOCYTES NFR BLD: 1.2 % (ref 4–15)
MONOCYTES NFR BLD: 1.3 % (ref 4–15)
MONOCYTES NFR BLD: 1.4 % (ref 4–15)
MONOCYTES NFR BLD: 1.5 % (ref 4–15)
MONOCYTES NFR BLD: 1.5 % (ref 4–15)
MONOCYTES NFR BLD: 1.7 % (ref 4–15)
MONOCYTES NFR BLD: 1.8 % (ref 4–15)
MONOCYTES NFR BLD: 1.8 % (ref 4–15)
MONOCYTES NFR BLD: 11 % (ref 4–15)
MONOCYTES NFR BLD: 2 % (ref 4–15)
MONOCYTES NFR BLD: 2 % (ref 4–15)
MONOCYTES NFR BLD: 2.1 % (ref 4–15)
MONOCYTES NFR BLD: 2.2 % (ref 4–15)
MONOCYTES NFR BLD: 2.3 % (ref 4–15)
MONOCYTES NFR BLD: 2.5 % (ref 4–15)
MONOCYTES NFR BLD: 2.5 % (ref 4–15)
MONOCYTES NFR BLD: 2.6 % (ref 4–15)
MONOCYTES NFR BLD: 2.7 % (ref 4–15)
MONOCYTES NFR BLD: 2.8 % (ref 4–15)
MONOCYTES NFR BLD: 2.9 % (ref 4–15)
MONOCYTES NFR BLD: 2.9 % (ref 4–15)
MONOCYTES NFR BLD: 3 % (ref 4–15)
MONOCYTES NFR BLD: 3.2 % (ref 4–15)
MONOCYTES NFR BLD: 3.3 % (ref 4–15)
MONOCYTES NFR BLD: 3.4 % (ref 4–15)
MONOCYTES NFR BLD: 3.5 % (ref 4–15)
MONOCYTES NFR BLD: 3.5 % (ref 4–15)
MONOCYTES NFR BLD: 3.6 % (ref 4–15)
MONOCYTES NFR BLD: 3.6 % (ref 4–15)
MONOCYTES NFR BLD: 3.9 % (ref 4–15)
MONOCYTES NFR BLD: 4 % (ref 4–15)
MONOCYTES NFR BLD: 4.1 % (ref 4–15)
MONOCYTES NFR BLD: 4.1 % (ref 4–15)
MONOCYTES NFR BLD: 4.4 % (ref 4–15)
MONOCYTES NFR BLD: 4.5 % (ref 4–15)
MONOCYTES NFR BLD: 4.9 % (ref 4–15)
MONOCYTES NFR BLD: 4.9 % (ref 4–15)
MONOCYTES NFR BLD: 5 % (ref 4–15)
MONOCYTES NFR BLD: 5.1 % (ref 4–15)
MONOCYTES NFR BLD: 5.2 % (ref 4–15)
MONOCYTES NFR BLD: 5.7 % (ref 4–15)
MONOCYTES NFR BLD: 5.8 % (ref 4–15)
MONOCYTES NFR BLD: 6 % (ref 4–15)
MONOCYTES NFR BLD: 6.1 % (ref 4–15)
MONOCYTES NFR BLD: 6.7 %
MONOCYTES NFR BLD: 6.7 % (ref 4–15)
MONOCYTES NFR BLD: 6.8 %
MONOCYTES NFR BLD: 6.8 % (ref 4–15)
MONOCYTES NFR BLD: 6.8 % (ref 4–15)
MONOCYTES NFR BLD: 6.9 % (ref 4–15)
MONOCYTES NFR BLD: 7 % (ref 4–15)
MONOCYTES NFR BLD: 7.1 % (ref 4–15)
MONOCYTES NFR BLD: 7.7 % (ref 4–15)
MONOCYTES NFR BLD: 7.7 % (ref 4–15)
MONOCYTES NFR BLD: 7.8 % (ref 4–15)
MONOCYTES NFR BLD: 8 % (ref 4–15)
MONOCYTES NFR BLD: 8 % (ref 4–15)
MONOCYTES NFR BLD: 8.3 % (ref 4–15)
MONOCYTES NFR BLD: 8.4 % (ref 4–15)
MONOCYTES NFR BLD: 8.7 % (ref 4–15)
MONOCYTES NFR BLD: 9.4 % (ref 4–15)
MONOS+MACROS NFR FLD MANUAL: 13 %
MONOS+MACROS NFR FLD MANUAL: 5 %
MONOS+MACROS NFR FLD MANUAL: 9 %
MORAXELLA CATARRHALIS: NOT DETECTED
MV PEAK A VEL: 0.37 M/S
MV PEAK E VEL: 0.68 M/S
MYCOBACTERIUM SPEC QL CULT: NORMAL
MYCOPLASMA PNEUMONIAE: NOT DETECTED
MYELOCYTES NFR BLD MANUAL: 1 %
MYELOCYTES NFR BLD MANUAL: 2 %
MYELOCYTES NFR BLD MANUAL: 3 %
MYELOCYTES NFR BLD MANUAL: 8 %
NEUTROPHILS # BLD AUTO: 10.2 K/UL (ref 1.8–7.7)
NEUTROPHILS # BLD AUTO: 10.9 K/UL (ref 1.8–7.7)
NEUTROPHILS # BLD AUTO: 11.6 K/UL (ref 1.8–7.7)
NEUTROPHILS # BLD AUTO: 12.5 K/UL (ref 1.8–7.7)
NEUTROPHILS # BLD AUTO: 12.6 K/UL (ref 1.8–7.7)
NEUTROPHILS # BLD AUTO: 13.9 K/UL (ref 1.8–7.7)
NEUTROPHILS # BLD AUTO: 14.8 K/UL (ref 1.8–7.7)
NEUTROPHILS # BLD AUTO: 15.2 K/UL (ref 1.8–7.7)
NEUTROPHILS # BLD AUTO: 17.3 K/UL (ref 1.8–7.7)
NEUTROPHILS # BLD AUTO: 2.4 K/UL (ref 1.8–7.7)
NEUTROPHILS # BLD AUTO: 2.7 K/UL (ref 1.8–7.7)
NEUTROPHILS # BLD AUTO: 2.8 K/UL (ref 1.8–7.7)
NEUTROPHILS # BLD AUTO: 2.9 K/UL
NEUTROPHILS # BLD AUTO: 2.9 K/UL (ref 1.8–7.7)
NEUTROPHILS # BLD AUTO: 20 K/UL (ref 1.8–7.7)
NEUTROPHILS # BLD AUTO: 22.2 K/UL (ref 1.8–7.7)
NEUTROPHILS # BLD AUTO: 3 K/UL (ref 1.8–7.7)
NEUTROPHILS # BLD AUTO: 3 K/UL (ref 1.8–7.7)
NEUTROPHILS # BLD AUTO: 3.2 K/UL (ref 1.8–7.7)
NEUTROPHILS # BLD AUTO: 3.2 K/UL (ref 1.8–7.7)
NEUTROPHILS # BLD AUTO: 3.3 K/UL (ref 1.8–7.7)
NEUTROPHILS # BLD AUTO: 3.5 K/UL (ref 1.8–7.7)
NEUTROPHILS # BLD AUTO: 3.5 K/UL (ref 1.8–7.7)
NEUTROPHILS # BLD AUTO: 3.7 K/UL (ref 1.8–7.7)
NEUTROPHILS # BLD AUTO: 3.8 K/UL
NEUTROPHILS # BLD AUTO: 3.8 K/UL (ref 1.8–7.7)
NEUTROPHILS # BLD AUTO: 3.8 K/UL (ref 1.8–7.7)
NEUTROPHILS # BLD AUTO: 4 K/UL (ref 1.8–7.7)
NEUTROPHILS # BLD AUTO: 4.1 K/UL (ref 1.8–7.7)
NEUTROPHILS # BLD AUTO: 4.2 K/UL (ref 1.8–7.7)
NEUTROPHILS # BLD AUTO: 4.4 K/UL (ref 1.8–7.7)
NEUTROPHILS # BLD AUTO: 4.4 K/UL (ref 1.8–7.7)
NEUTROPHILS # BLD AUTO: 4.5 K/UL (ref 1.8–7.7)
NEUTROPHILS # BLD AUTO: 4.7 K/UL (ref 1.8–7.7)
NEUTROPHILS # BLD AUTO: 4.8 K/UL (ref 1.8–7.7)
NEUTROPHILS # BLD AUTO: 4.8 K/UL (ref 1.8–7.7)
NEUTROPHILS # BLD AUTO: 4.9 K/UL (ref 1.8–7.7)
NEUTROPHILS # BLD AUTO: 5 K/UL (ref 1.8–7.7)
NEUTROPHILS # BLD AUTO: 5.1 K/UL (ref 1.8–7.7)
NEUTROPHILS # BLD AUTO: 5.1 K/UL (ref 1.8–7.7)
NEUTROPHILS # BLD AUTO: 5.5 K/UL (ref 1.8–7.7)
NEUTROPHILS # BLD AUTO: 5.6 K/UL (ref 1.8–7.7)
NEUTROPHILS # BLD AUTO: 5.7 K/UL (ref 1.8–7.7)
NEUTROPHILS # BLD AUTO: 6.1 K/UL (ref 1.8–7.7)
NEUTROPHILS # BLD AUTO: 6.3 K/UL (ref 1.8–7.7)
NEUTROPHILS # BLD AUTO: 6.3 K/UL (ref 1.8–7.7)
NEUTROPHILS # BLD AUTO: 6.5 K/UL (ref 1.8–7.7)
NEUTROPHILS # BLD AUTO: 6.6 K/UL (ref 1.8–7.7)
NEUTROPHILS # BLD AUTO: 6.8 K/UL (ref 1.8–7.7)
NEUTROPHILS # BLD AUTO: 7 K/UL (ref 1.8–7.7)
NEUTROPHILS # BLD AUTO: 7 K/UL (ref 1.8–7.7)
NEUTROPHILS # BLD AUTO: 7.2 K/UL (ref 1.8–7.7)
NEUTROPHILS # BLD AUTO: 7.3 K/UL (ref 1.8–7.7)
NEUTROPHILS # BLD AUTO: 7.4 K/UL (ref 1.8–7.7)
NEUTROPHILS # BLD AUTO: 7.7 K/UL (ref 1.8–7.7)
NEUTROPHILS # BLD AUTO: 7.9 K/UL (ref 1.8–7.7)
NEUTROPHILS # BLD AUTO: 8 K/UL (ref 1.8–7.7)
NEUTROPHILS # BLD AUTO: 8 K/UL (ref 1.8–7.7)
NEUTROPHILS # BLD AUTO: 8.2 K/UL (ref 1.8–7.7)
NEUTROPHILS # BLD AUTO: 8.7 K/UL (ref 1.8–7.7)
NEUTROPHILS # BLD AUTO: 9 K/UL (ref 1.8–7.7)
NEUTROPHILS # BLD AUTO: 9 K/UL (ref 1.8–7.7)
NEUTROPHILS # BLD AUTO: 9.2 K/UL (ref 1.8–7.7)
NEUTROPHILS # BLD AUTO: 9.4 K/UL (ref 1.8–7.7)
NEUTROPHILS # BLD AUTO: 9.4 K/UL (ref 1.8–7.7)
NEUTROPHILS # BLD AUTO: 9.7 K/UL (ref 1.8–7.7)
NEUTROPHILS # BLD AUTO: 9.7 K/UL (ref 1.8–7.7)
NEUTROPHILS NFR BLD: 45.6 %
NEUTROPHILS NFR BLD: 49.6 % (ref 38–73)
NEUTROPHILS NFR BLD: 51 %
NEUTROPHILS NFR BLD: 51 % (ref 38–73)
NEUTROPHILS NFR BLD: 51 % (ref 38–73)
NEUTROPHILS NFR BLD: 51.6 % (ref 38–73)
NEUTROPHILS NFR BLD: 52.5 % (ref 38–73)
NEUTROPHILS NFR BLD: 53.5 % (ref 38–73)
NEUTROPHILS NFR BLD: 53.9 % (ref 38–73)
NEUTROPHILS NFR BLD: 55 % (ref 38–73)
NEUTROPHILS NFR BLD: 55 % (ref 38–73)
NEUTROPHILS NFR BLD: 58 % (ref 38–73)
NEUTROPHILS NFR BLD: 58 % (ref 38–73)
NEUTROPHILS NFR BLD: 58.7 % (ref 38–73)
NEUTROPHILS NFR BLD: 58.8 % (ref 38–73)
NEUTROPHILS NFR BLD: 61 % (ref 38–73)
NEUTROPHILS NFR BLD: 62 % (ref 38–73)
NEUTROPHILS NFR BLD: 62 % (ref 38–73)
NEUTROPHILS NFR BLD: 63 % (ref 38–73)
NEUTROPHILS NFR BLD: 63.8 % (ref 38–73)
NEUTROPHILS NFR BLD: 66.8 % (ref 38–73)
NEUTROPHILS NFR BLD: 69 % (ref 38–73)
NEUTROPHILS NFR BLD: 70.1 % (ref 38–73)
NEUTROPHILS NFR BLD: 70.4 % (ref 38–73)
NEUTROPHILS NFR BLD: 71 % (ref 38–73)
NEUTROPHILS NFR BLD: 72.2 % (ref 38–73)
NEUTROPHILS NFR BLD: 72.5 % (ref 38–73)
NEUTROPHILS NFR BLD: 73.7 % (ref 38–73)
NEUTROPHILS NFR BLD: 73.7 % (ref 38–73)
NEUTROPHILS NFR BLD: 74 % (ref 38–73)
NEUTROPHILS NFR BLD: 74.7 % (ref 38–73)
NEUTROPHILS NFR BLD: 75.2 % (ref 38–73)
NEUTROPHILS NFR BLD: 76 % (ref 38–73)
NEUTROPHILS NFR BLD: 77.4 % (ref 38–73)
NEUTROPHILS NFR BLD: 78 % (ref 38–73)
NEUTROPHILS NFR BLD: 78 % (ref 38–73)
NEUTROPHILS NFR BLD: 78.1 % (ref 38–73)
NEUTROPHILS NFR BLD: 80.1 % (ref 38–73)
NEUTROPHILS NFR BLD: 81 % (ref 38–73)
NEUTROPHILS NFR BLD: 81.5 % (ref 38–73)
NEUTROPHILS NFR BLD: 81.5 % (ref 38–73)
NEUTROPHILS NFR BLD: 81.9 % (ref 38–73)
NEUTROPHILS NFR BLD: 82.1 % (ref 38–73)
NEUTROPHILS NFR BLD: 82.3 % (ref 38–73)
NEUTROPHILS NFR BLD: 82.7 % (ref 38–73)
NEUTROPHILS NFR BLD: 83 % (ref 38–73)
NEUTROPHILS NFR BLD: 83.2 % (ref 38–73)
NEUTROPHILS NFR BLD: 84.2 % (ref 38–73)
NEUTROPHILS NFR BLD: 84.4 % (ref 38–73)
NEUTROPHILS NFR BLD: 84.8 % (ref 38–73)
NEUTROPHILS NFR BLD: 85.2 % (ref 38–73)
NEUTROPHILS NFR BLD: 86 % (ref 38–73)
NEUTROPHILS NFR BLD: 86 % (ref 38–73)
NEUTROPHILS NFR BLD: 86.2 % (ref 38–73)
NEUTROPHILS NFR BLD: 87.1 % (ref 38–73)
NEUTROPHILS NFR BLD: 87.6 % (ref 38–73)
NEUTROPHILS NFR BLD: 87.7 % (ref 38–73)
NEUTROPHILS NFR BLD: 88 % (ref 38–73)
NEUTROPHILS NFR BLD: 88.1 % (ref 38–73)
NEUTROPHILS NFR BLD: 88.8 % (ref 38–73)
NEUTROPHILS NFR BLD: 89.2 % (ref 38–73)
NEUTROPHILS NFR BLD: 89.4 % (ref 38–73)
NEUTROPHILS NFR BLD: 89.5 % (ref 38–73)
NEUTROPHILS NFR BLD: 89.6 % (ref 38–73)
NEUTROPHILS NFR BLD: 89.9 % (ref 38–73)
NEUTROPHILS NFR BLD: 90 % (ref 38–73)
NEUTROPHILS NFR BLD: 90.1 % (ref 38–73)
NEUTROPHILS NFR BLD: 90.2 % (ref 38–73)
NEUTROPHILS NFR BLD: 90.3 % (ref 38–73)
NEUTROPHILS NFR BLD: 90.4 % (ref 38–73)
NEUTROPHILS NFR BLD: 90.5 % (ref 38–73)
NEUTROPHILS NFR BLD: 90.7 % (ref 38–73)
NEUTROPHILS NFR BLD: 90.9 % (ref 38–73)
NEUTROPHILS NFR BLD: 90.9 % (ref 38–73)
NEUTROPHILS NFR BLD: 91 % (ref 38–73)
NEUTROPHILS NFR BLD: 91 % (ref 38–73)
NEUTROPHILS NFR BLD: 91.4 % (ref 38–73)
NEUTROPHILS NFR BLD: 91.5 % (ref 38–73)
NEUTROPHILS NFR BLD: 91.7 % (ref 38–73)
NEUTROPHILS NFR BLD: 91.8 % (ref 38–73)
NEUTROPHILS NFR BLD: 91.9 % (ref 38–73)
NEUTROPHILS NFR BLD: 92 % (ref 38–73)
NEUTROPHILS NFR BLD: 92 % (ref 38–73)
NEUTROPHILS NFR BLD: 92.7 % (ref 38–73)
NEUTROPHILS NFR BLD: 92.8 % (ref 38–73)
NEUTROPHILS NFR BLD: 92.9 % (ref 38–73)
NEUTROPHILS NFR BLD: 93.5 % (ref 38–73)
NEUTROPHILS NFR BLD: 93.6 % (ref 38–73)
NEUTROPHILS NFR BLD: 94 % (ref 38–73)
NEUTROPHILS NFR BLD: 94.4 % (ref 38–73)
NEUTROPHILS NFR BLD: 94.5 % (ref 38–73)
NEUTROPHILS NFR BLD: 95.1 % (ref 38–73)
NEUTROPHILS NFR FLD MANUAL: 82 %
NEUTROPHILS NFR FLD MANUAL: 88 %
NEUTROPHILS NFR FLD MANUAL: 92 %
NEUTS BAND NFR BLD MANUAL: 11 %
NEUTS BAND NFR BLD MANUAL: 12 %
NEUTS BAND NFR BLD MANUAL: 16.5 %
NEUTS BAND NFR BLD MANUAL: 17 %
NEUTS BAND NFR BLD MANUAL: 17 %
NEUTS BAND NFR BLD MANUAL: 19 %
NEUTS BAND NFR BLD MANUAL: 19.5 %
NEUTS BAND NFR BLD MANUAL: 2 %
NEUTS BAND NFR BLD MANUAL: 2 %
NEUTS BAND NFR BLD MANUAL: 20 %
NEUTS BAND NFR BLD MANUAL: 20 %
NEUTS BAND NFR BLD MANUAL: 21 %
NEUTS BAND NFR BLD MANUAL: 3 %
NEUTS BAND NFR BLD MANUAL: 4 %
NEUTS BAND NFR BLD MANUAL: 8 %
NITRITE UR QL STRIP: NEGATIVE
NITRITE UR QL STRIP: NEGATIVE
NRBC BLD-RTO: 0 /100 WBC
NRBC BLD-RTO: 1 /100 WBC
NRBC BLD-RTO: 10 /100 WBC
NRBC BLD-RTO: 10 /100 WBC
NRBC BLD-RTO: 2 /100 WBC
NRBC BLD-RTO: 3 /100 WBC
NRBC BLD-RTO: 4 /100 WBC
NRBC BLD-RTO: 5 /100 WBC
NRBC BLD-RTO: 6 /100 WBC
NRBC BLD-RTO: 8 /100 WBC
NRBC BLD-RTO: 8 /100 WBC
NRBC BLD-RTO: 9 /100 WBC
NUM UNITS TRANS FFP: NORMAL
NUM UNITS TRANS PACKED RBC: NORMAL
OVALOCYTES BLD QL SMEAR: ABNORMAL
PARAINFLUENZA: NOT DETECTED
PATH REV BLD -IMP: NORMAL
PATH REV BLD -IMP: NORMAL
PCO2 BLDA: 29.1 MMHG (ref 35–45)
PCO2 BLDA: 29.4 MMHG (ref 35–45)
PCO2 BLDA: 29.7 MMHG (ref 35–45)
PCO2 BLDA: 29.8 MMHG (ref 35–45)
PCO2 BLDA: 31.5 MMHG (ref 35–45)
PCO2 BLDA: 31.9 MMHG (ref 35–45)
PCO2 BLDA: 32.6 MMHG (ref 35–45)
PCO2 BLDA: 33.2 MMHG (ref 35–45)
PCO2 BLDA: 35.4 MMHG (ref 35–45)
PCO2 BLDA: 35.8 MMHG (ref 35–45)
PCO2 BLDA: 36 MMHG (ref 35–45)
PCO2 BLDA: 36.2 MMHG (ref 35–45)
PCO2 BLDA: 36.8 MMHG (ref 35–45)
PCO2 BLDA: 36.8 MMHG (ref 35–45)
PCO2 BLDA: 37.3 MMHG (ref 35–45)
PCO2 BLDA: 37.4 MMHG (ref 35–45)
PCO2 BLDA: 37.6 MMHG (ref 35–45)
PCO2 BLDA: 38 MMHG (ref 35–45)
PCO2 BLDA: 38.3 MMHG (ref 35–45)
PCO2 BLDA: 38.4 MMHG (ref 35–45)
PCO2 BLDA: 38.4 MMHG (ref 35–45)
PCO2 BLDA: 38.5 MMHG (ref 35–45)
PCO2 BLDA: 38.5 MMHG (ref 35–45)
PCO2 BLDA: 38.7 MMHG (ref 35–45)
PCO2 BLDA: 38.7 MMHG (ref 35–45)
PCO2 BLDA: 38.8 MMHG (ref 35–45)
PCO2 BLDA: 38.9 MMHG (ref 35–45)
PCO2 BLDA: 39.2 MMHG (ref 35–45)
PCO2 BLDA: 39.5 MMHG (ref 35–45)
PCO2 BLDA: 39.5 MMHG (ref 35–45)
PCO2 BLDA: 39.6 MMHG (ref 35–45)
PCO2 BLDA: 39.6 MMHG (ref 35–45)
PCO2 BLDA: 40 MMHG (ref 35–45)
PCO2 BLDA: 40.1 MMHG (ref 35–45)
PCO2 BLDA: 40.4 MMHG (ref 35–45)
PCO2 BLDA: 40.5 MMHG (ref 35–45)
PCO2 BLDA: 40.5 MMHG (ref 35–45)
PCO2 BLDA: 40.7 MMHG (ref 35–45)
PCO2 BLDA: 41 MMHG (ref 35–45)
PCO2 BLDA: 41.1 MMHG (ref 35–45)
PCO2 BLDA: 41.5 MMHG (ref 35–45)
PCO2 BLDA: 42 MMHG (ref 35–45)
PCO2 BLDA: 42 MMHG (ref 35–45)
PCO2 BLDA: 42.1 MMHG (ref 35–45)
PCO2 BLDA: 42.2 MMHG (ref 35–45)
PCO2 BLDA: 42.3 MMHG (ref 35–45)
PCO2 BLDA: 42.6 MMHG (ref 35–45)
PCO2 BLDA: 42.7 MMHG (ref 35–45)
PCO2 BLDA: 42.8 MMHG (ref 35–45)
PCO2 BLDA: 43.2 MMHG (ref 35–45)
PCO2 BLDA: 43.3 MMHG (ref 35–45)
PCO2 BLDA: 43.5 MMHG (ref 35–45)
PCO2 BLDA: 43.7 MMHG (ref 35–45)
PCO2 BLDA: 43.8 MMHG (ref 35–45)
PCO2 BLDA: 44.1 MMHG (ref 35–45)
PCO2 BLDA: 44.3 MMHG (ref 35–45)
PCO2 BLDA: 44.5 MMHG (ref 35–45)
PCO2 BLDA: 45.1 MMHG (ref 35–45)
PCO2 BLDA: 46.2 MMHG (ref 35–45)
PCO2 BLDA: 46.3 MMHG (ref 35–45)
PCO2 BLDA: 46.5 MMHG (ref 35–45)
PCO2 BLDA: 46.7 MMHG (ref 35–45)
PCO2 BLDA: 46.8 MMHG (ref 35–45)
PCO2 BLDA: 47.4 MMHG (ref 35–45)
PCO2 BLDA: 47.4 MMHG (ref 35–45)
PCO2 BLDA: 47.7 MMHG (ref 35–45)
PCO2 BLDA: 48.5 MMHG (ref 35–45)
PCO2 BLDA: 48.9 MMHG (ref 35–45)
PCO2 BLDA: 50.2 MMHG (ref 35–45)
PCO2 BLDA: 50.5 MMHG (ref 35–45)
PCO2 BLDA: 50.5 MMHG (ref 35–45)
PCO2 BLDA: 50.6 MMHG (ref 35–45)
PCO2 BLDA: 51.3 MMHG (ref 35–45)
PCO2 BLDA: 51.8 MMHG (ref 35–45)
PCO2 BLDA: 51.9 MMHG (ref 35–45)
PCO2 BLDA: 52.2 MMHG (ref 35–45)
PCO2 BLDA: 52.8 MMHG (ref 35–45)
PCO2 BLDA: 52.9 MMHG (ref 35–45)
PCO2 BLDA: 54.2 MMHG (ref 35–45)
PCO2 BLDA: 54.4 MMHG (ref 35–45)
PCO2 BLDA: 55.8 MMHG (ref 35–45)
PCO2 BLDA: 56.3 MMHG (ref 35–45)
PCO2 BLDA: 56.3 MMHG (ref 35–45)
PCO2 BLDA: 56.5 MMHG (ref 35–45)
PCO2 BLDA: 56.9 MMHG (ref 35–45)
PCO2 BLDA: 58.1 MMHG (ref 35–45)
PCO2 BLDA: 58.3 MMHG (ref 35–45)
PCO2 BLDA: 59 MMHG (ref 35–45)
PCO2 BLDA: 59.1 MMHG (ref 35–45)
PCO2 BLDA: 59.7 MMHG (ref 35–45)
PCO2 BLDA: 60.8 MMHG (ref 35–45)
PCO2 BLDA: 61.1 MMHG (ref 35–45)
PCO2 BLDA: 62.1 MMHG (ref 35–45)
PCO2 BLDA: 65.5 MMHG (ref 35–45)
PCO2 BLDA: 65.7 MMHG (ref 35–45)
PEEP: 10
PEEP: 10
PEEP: 12
PEEP: 15
PEEP: 298
PEEP: 5
PEEP: 6
PEEP: 6
PEEP: 8
PH SMN: 7.08 [PH] (ref 7.35–7.45)
PH SMN: 7.14 [PH] (ref 7.35–7.45)
PH SMN: 7.15 [PH] (ref 7.35–7.45)
PH SMN: 7.17 [PH] (ref 7.35–7.45)
PH SMN: 7.18 [PH] (ref 7.35–7.45)
PH SMN: 7.18 [PH] (ref 7.35–7.45)
PH SMN: 7.19 [PH] (ref 7.35–7.45)
PH SMN: 7.19 [PH] (ref 7.35–7.45)
PH SMN: 7.2 [PH] (ref 7.35–7.45)
PH SMN: 7.2 [PH] (ref 7.35–7.45)
PH SMN: 7.22 [PH] (ref 7.35–7.45)
PH SMN: 7.23 [PH] (ref 7.35–7.45)
PH SMN: 7.24 [PH] (ref 7.35–7.45)
PH SMN: 7.25 [PH] (ref 7.35–7.45)
PH SMN: 7.26 [PH] (ref 7.35–7.45)
PH SMN: 7.26 [PH] (ref 7.35–7.45)
PH SMN: 7.27 [PH] (ref 7.35–7.45)
PH SMN: 7.28 [PH] (ref 7.35–7.45)
PH SMN: 7.29 [PH] (ref 7.35–7.45)
PH SMN: 7.3 [PH] (ref 7.35–7.45)
PH SMN: 7.31 [PH] (ref 7.35–7.45)
PH SMN: 7.32 [PH] (ref 7.35–7.45)
PH SMN: 7.33 [PH] (ref 7.35–7.45)
PH SMN: 7.34 [PH] (ref 7.35–7.45)
PH SMN: 7.35 [PH] (ref 7.35–7.45)
PH SMN: 7.36 [PH] (ref 7.35–7.45)
PH SMN: 7.36 [PH] (ref 7.35–7.45)
PH SMN: 7.37 [PH] (ref 7.35–7.45)
PH SMN: 7.38 [PH] (ref 7.35–7.45)
PH SMN: 7.39 [PH] (ref 7.35–7.45)
PH SMN: 7.4 [PH] (ref 7.35–7.45)
PH SMN: 7.41 [PH] (ref 7.35–7.45)
PH SMN: 7.41 [PH] (ref 7.35–7.45)
PH SMN: 7.42 [PH] (ref 7.35–7.45)
PH SMN: 7.43 [PH] (ref 7.35–7.45)
PH SMN: 7.45 [PH] (ref 7.35–7.45)
PH SMN: 7.46 [PH] (ref 7.35–7.45)
PH SMN: 7.46 [PH] (ref 7.35–7.45)
PH SMN: 7.48 [PH] (ref 7.35–7.45)
PH SMN: 7.51 [PH] (ref 7.35–7.45)
PH UR STRIP: 6 [PH] (ref 5–8)
PH UR STRIP: 6 [PH] (ref 5–8)
PHOSPHATE SERPL-MCNC: 1.3 MG/DL (ref 2.7–4.5)
PHOSPHATE SERPL-MCNC: 1.7 MG/DL (ref 2.7–4.5)
PHOSPHATE SERPL-MCNC: 1.9 MG/DL (ref 2.7–4.5)
PHOSPHATE SERPL-MCNC: 1.9 MG/DL (ref 2.7–4.5)
PHOSPHATE SERPL-MCNC: 2.2 MG/DL (ref 2.7–4.5)
PHOSPHATE SERPL-MCNC: 2.4 MG/DL (ref 2.7–4.5)
PHOSPHATE SERPL-MCNC: 2.4 MG/DL (ref 2.7–4.5)
PHOSPHATE SERPL-MCNC: 2.7 MG/DL (ref 2.7–4.5)
PHOSPHATE SERPL-MCNC: 2.9 MG/DL (ref 2.7–4.5)
PHOSPHATE SERPL-MCNC: 2.9 MG/DL (ref 2.7–4.5)
PHOSPHATE SERPL-MCNC: 3 MG/DL (ref 2.7–4.5)
PHOSPHATE SERPL-MCNC: 3.1 MG/DL (ref 2.7–4.5)
PHOSPHATE SERPL-MCNC: 3.2 MG/DL (ref 2.7–4.5)
PHOSPHATE SERPL-MCNC: 3.2 MG/DL (ref 2.7–4.5)
PHOSPHATE SERPL-MCNC: 3.4 MG/DL (ref 2.7–4.5)
PHOSPHATE SERPL-MCNC: 3.4 MG/DL (ref 2.7–4.5)
PHOSPHATE SERPL-MCNC: 3.5 MG/DL (ref 2.7–4.5)
PHOSPHATE SERPL-MCNC: 3.6 MG/DL (ref 2.7–4.5)
PHOSPHATE SERPL-MCNC: 3.8 MG/DL (ref 2.7–4.5)
PHOSPHATE SERPL-MCNC: 3.9 MG/DL (ref 2.7–4.5)
PHOSPHATE SERPL-MCNC: 4 MG/DL (ref 2.7–4.5)
PHOSPHATE SERPL-MCNC: 4.2 MG/DL (ref 2.7–4.5)
PHOSPHATE SERPL-MCNC: 4.3 MG/DL (ref 2.7–4.5)
PHOSPHATE SERPL-MCNC: 4.4 MG/DL (ref 2.7–4.5)
PHOSPHATE SERPL-MCNC: 4.5 MG/DL (ref 2.7–4.5)
PHOSPHATE SERPL-MCNC: 4.6 MG/DL (ref 2.7–4.5)
PHOSPHATE SERPL-MCNC: 4.7 MG/DL (ref 2.7–4.5)
PHOSPHATE SERPL-MCNC: 4.8 MG/DL (ref 2.7–4.5)
PHOSPHATE SERPL-MCNC: 4.9 MG/DL (ref 2.7–4.5)
PHOSPHATE SERPL-MCNC: 5 MG/DL (ref 2.7–4.5)
PHOSPHATE SERPL-MCNC: 5.5 MG/DL (ref 2.7–4.5)
PHOSPHATE SERPL-MCNC: 5.8 MG/DL (ref 2.7–4.5)
PHOSPHATE SERPL-MCNC: 5.9 MG/DL (ref 2.7–4.5)
PHOSPHATE SERPL-MCNC: 6.2 MG/DL (ref 2.7–4.5)
PHOSPHATE SERPL-MCNC: 7 MG/DL (ref 2.7–4.5)
PHOSPHATE SERPL-MCNC: 7.3 MG/DL (ref 2.7–4.5)
PIP: 20
PIP: 20
PIP: 24
PIP: 24
PIP: 26
PIP: 28
PIP: 29
PIP: 30
PIP: 33
PIP: 37
PIP: 37
PISA TR MAX VEL: 2.58 M/S
PLATELET # BLD AUTO: 105 K/UL (ref 150–350)
PLATELET # BLD AUTO: 111 K/UL (ref 150–350)
PLATELET # BLD AUTO: 113 K/UL (ref 150–350)
PLATELET # BLD AUTO: 114 K/UL (ref 150–350)
PLATELET # BLD AUTO: 115 K/UL (ref 150–350)
PLATELET # BLD AUTO: 116 K/UL (ref 150–350)
PLATELET # BLD AUTO: 117 K/UL (ref 150–350)
PLATELET # BLD AUTO: 117 K/UL (ref 150–350)
PLATELET # BLD AUTO: 118 K/UL (ref 150–350)
PLATELET # BLD AUTO: 119 K/UL (ref 150–350)
PLATELET # BLD AUTO: 120 K/UL (ref 150–350)
PLATELET # BLD AUTO: 124 K/UL (ref 150–350)
PLATELET # BLD AUTO: 126 K/UL (ref 150–350)
PLATELET # BLD AUTO: 127 K/UL (ref 150–350)
PLATELET # BLD AUTO: 128 K/UL (ref 150–350)
PLATELET # BLD AUTO: 130 K/UL (ref 150–350)
PLATELET # BLD AUTO: 131 K/UL (ref 150–350)
PLATELET # BLD AUTO: 133 K/UL (ref 150–350)
PLATELET # BLD AUTO: 133 K/UL (ref 150–350)
PLATELET # BLD AUTO: 134 K/UL (ref 150–350)
PLATELET # BLD AUTO: 134 K/UL (ref 150–350)
PLATELET # BLD AUTO: 135 K/UL (ref 150–350)
PLATELET # BLD AUTO: 136 K/UL (ref 150–350)
PLATELET # BLD AUTO: 138 K/UL (ref 150–350)
PLATELET # BLD AUTO: 138 K/UL (ref 150–350)
PLATELET # BLD AUTO: 14 K/UL (ref 150–350)
PLATELET # BLD AUTO: 140 K/UL (ref 150–350)
PLATELET # BLD AUTO: 140 K/UL (ref 150–350)
PLATELET # BLD AUTO: 141 K/UL (ref 150–350)
PLATELET # BLD AUTO: 142 K/UL (ref 150–350)
PLATELET # BLD AUTO: 143 K/UL (ref 150–350)
PLATELET # BLD AUTO: 148 K/UL (ref 150–350)
PLATELET # BLD AUTO: 148 K/UL (ref 150–350)
PLATELET # BLD AUTO: 151 K/UL
PLATELET # BLD AUTO: 153 K/UL (ref 150–350)
PLATELET # BLD AUTO: 156 K/UL (ref 150–350)
PLATELET # BLD AUTO: 156 K/UL (ref 150–350)
PLATELET # BLD AUTO: 157 K/UL (ref 150–350)
PLATELET # BLD AUTO: 157 K/UL (ref 150–350)
PLATELET # BLD AUTO: 158 K/UL (ref 150–350)
PLATELET # BLD AUTO: 159 K/UL
PLATELET # BLD AUTO: 162 K/UL (ref 150–350)
PLATELET # BLD AUTO: 162 K/UL (ref 150–350)
PLATELET # BLD AUTO: 166 K/UL (ref 150–350)
PLATELET # BLD AUTO: 171 K/UL (ref 150–350)
PLATELET # BLD AUTO: 172 K/UL (ref 150–350)
PLATELET # BLD AUTO: 172 K/UL (ref 150–350)
PLATELET # BLD AUTO: 175 K/UL (ref 150–350)
PLATELET # BLD AUTO: 175 K/UL (ref 150–350)
PLATELET # BLD AUTO: 181 K/UL (ref 150–350)
PLATELET # BLD AUTO: 181 K/UL (ref 150–350)
PLATELET # BLD AUTO: 182 K/UL (ref 150–350)
PLATELET # BLD AUTO: 186 K/UL (ref 150–350)
PLATELET # BLD AUTO: 189 K/UL (ref 150–350)
PLATELET # BLD AUTO: 19 K/UL (ref 150–350)
PLATELET # BLD AUTO: 195 K/UL (ref 150–350)
PLATELET # BLD AUTO: 202 K/UL (ref 150–350)
PLATELET # BLD AUTO: 209 K/UL (ref 150–350)
PLATELET # BLD AUTO: 21 K/UL (ref 150–350)
PLATELET # BLD AUTO: 215 K/UL (ref 150–350)
PLATELET # BLD AUTO: 22 K/UL (ref 150–350)
PLATELET # BLD AUTO: 24 K/UL (ref 150–350)
PLATELET # BLD AUTO: 26 K/UL (ref 150–350)
PLATELET # BLD AUTO: 30 K/UL (ref 150–350)
PLATELET # BLD AUTO: 31 K/UL (ref 150–350)
PLATELET # BLD AUTO: 41 K/UL (ref 150–350)
PLATELET # BLD AUTO: 45 K/UL (ref 150–350)
PLATELET # BLD AUTO: 48 K/UL (ref 150–350)
PLATELET # BLD AUTO: 50 K/UL (ref 150–350)
PLATELET # BLD AUTO: 53 K/UL (ref 150–350)
PLATELET # BLD AUTO: 55 K/UL (ref 150–350)
PLATELET # BLD AUTO: 55 K/UL (ref 150–350)
PLATELET # BLD AUTO: 56 K/UL (ref 150–350)
PLATELET # BLD AUTO: 57 K/UL (ref 150–350)
PLATELET # BLD AUTO: 69 K/UL (ref 150–350)
PLATELET # BLD AUTO: 70 K/UL (ref 150–350)
PLATELET # BLD AUTO: 76 K/UL (ref 150–350)
PLATELET # BLD AUTO: 76 K/UL (ref 150–350)
PLATELET # BLD AUTO: 78 K/UL (ref 150–350)
PLATELET # BLD AUTO: 82 K/UL (ref 150–350)
PLATELET # BLD AUTO: 94 K/UL (ref 150–350)
PLATELET # BLD AUTO: 97 K/UL (ref 150–350)
PLATELET BLD QL SMEAR: ABNORMAL
PMV BLD AUTO: 10 FL (ref 9.2–12.9)
PMV BLD AUTO: 10.1 FL (ref 9.2–12.9)
PMV BLD AUTO: 10.3 FL (ref 9.2–12.9)
PMV BLD AUTO: 10.3 FL (ref 9.2–12.9)
PMV BLD AUTO: 10.4 FL (ref 9.2–12.9)
PMV BLD AUTO: 10.4 FL (ref 9.2–12.9)
PMV BLD AUTO: 10.5 FL (ref 9.2–12.9)
PMV BLD AUTO: 10.6 FL (ref 9.2–12.9)
PMV BLD AUTO: 10.7 FL (ref 9.2–12.9)
PMV BLD AUTO: 10.8 FL (ref 9.2–12.9)
PMV BLD AUTO: 10.9 FL (ref 9.2–12.9)
PMV BLD AUTO: 11 FL (ref 9.2–12.9)
PMV BLD AUTO: 11.1 FL (ref 9.2–12.9)
PMV BLD AUTO: 11.2 FL (ref 9.2–12.9)
PMV BLD AUTO: 11.3 FL (ref 9.2–12.9)
PMV BLD AUTO: 11.4 FL
PMV BLD AUTO: 11.4 FL
PMV BLD AUTO: 11.4 FL (ref 9.2–12.9)
PMV BLD AUTO: 11.4 FL (ref 9.2–12.9)
PMV BLD AUTO: 11.5 FL (ref 9.2–12.9)
PMV BLD AUTO: 11.6 FL (ref 9.2–12.9)
PMV BLD AUTO: 11.7 FL (ref 9.2–12.9)
PMV BLD AUTO: 11.7 FL (ref 9.2–12.9)
PMV BLD AUTO: 11.9 FL (ref 9.2–12.9)
PMV BLD AUTO: 12.1 FL (ref 9.2–12.9)
PMV BLD AUTO: 12.7 FL (ref 9.2–12.9)
PMV BLD AUTO: 12.7 FL (ref 9.2–12.9)
PMV BLD AUTO: 12.8 FL (ref 9.2–12.9)
PMV BLD AUTO: 13.3 FL (ref 9.2–12.9)
PMV BLD AUTO: 9.1 FL (ref 9.2–12.9)
PMV BLD AUTO: 9.5 FL (ref 9.2–12.9)
PMV BLD AUTO: 9.6 FL (ref 9.2–12.9)
PMV BLD AUTO: 9.7 FL (ref 9.2–12.9)
PMV BLD AUTO: 9.9 FL (ref 9.2–12.9)
PMV BLD AUTO: ABNORMAL FL (ref 9.2–12.9)
PO2 BLDA: 103 MMHG (ref 80–100)
PO2 BLDA: 104 MMHG (ref 80–100)
PO2 BLDA: 105 MMHG (ref 80–100)
PO2 BLDA: 112 MMHG (ref 80–100)
PO2 BLDA: 116 MMHG (ref 80–100)
PO2 BLDA: 116 MMHG (ref 80–100)
PO2 BLDA: 118 MMHG (ref 80–100)
PO2 BLDA: 124 MMHG (ref 80–100)
PO2 BLDA: 125 MMHG (ref 80–100)
PO2 BLDA: 127 MMHG (ref 80–100)
PO2 BLDA: 134 MMHG (ref 80–100)
PO2 BLDA: 135 MMHG (ref 80–100)
PO2 BLDA: 141 MMHG (ref 80–100)
PO2 BLDA: 141 MMHG (ref 80–100)
PO2 BLDA: 144 MMHG (ref 80–100)
PO2 BLDA: 146 MMHG (ref 80–100)
PO2 BLDA: 146 MMHG (ref 80–100)
PO2 BLDA: 147 MMHG (ref 80–100)
PO2 BLDA: 147 MMHG (ref 80–100)
PO2 BLDA: 152 MMHG (ref 80–100)
PO2 BLDA: 155 MMHG (ref 80–100)
PO2 BLDA: 156 MMHG (ref 80–100)
PO2 BLDA: 171 MMHG (ref 80–100)
PO2 BLDA: 172 MMHG (ref 80–100)
PO2 BLDA: 174 MMHG (ref 80–100)
PO2 BLDA: 178 MMHG (ref 80–100)
PO2 BLDA: 26 MMHG (ref 40–60)
PO2 BLDA: 262 MMHG (ref 80–100)
PO2 BLDA: 28 MMHG (ref 40–60)
PO2 BLDA: 29 MMHG (ref 40–60)
PO2 BLDA: 30 MMHG (ref 40–60)
PO2 BLDA: 31 MMHG (ref 40–60)
PO2 BLDA: 32 MMHG (ref 40–60)
PO2 BLDA: 33 MMHG (ref 40–60)
PO2 BLDA: 37 MMHG (ref 40–60)
PO2 BLDA: 391 MMHG (ref 80–100)
PO2 BLDA: 41 MMHG (ref 40–60)
PO2 BLDA: 419 MMHG (ref 80–100)
PO2 BLDA: 433 MMHG (ref 80–100)
PO2 BLDA: 442 MMHG (ref 80–100)
PO2 BLDA: 51 MMHG (ref 40–60)
PO2 BLDA: 52 MMHG (ref 80–100)
PO2 BLDA: 54 MMHG (ref 80–100)
PO2 BLDA: 56 MMHG (ref 80–100)
PO2 BLDA: 59 MMHG (ref 80–100)
PO2 BLDA: 591 MMHG (ref 80–100)
PO2 BLDA: 60 MMHG (ref 80–100)
PO2 BLDA: 60 MMHG (ref 80–100)
PO2 BLDA: 61 MMHG (ref 80–100)
PO2 BLDA: 61 MMHG (ref 80–100)
PO2 BLDA: 62 MMHG (ref 80–100)
PO2 BLDA: 63 MMHG (ref 80–100)
PO2 BLDA: 64 MMHG (ref 80–100)
PO2 BLDA: 65 MMHG (ref 80–100)
PO2 BLDA: 65 MMHG (ref 80–100)
PO2 BLDA: 67 MMHG (ref 80–100)
PO2 BLDA: 68 MMHG (ref 80–100)
PO2 BLDA: 71 MMHG (ref 80–100)
PO2 BLDA: 72 MMHG (ref 80–100)
PO2 BLDA: 72 MMHG (ref 80–100)
PO2 BLDA: 74 MMHG (ref 80–100)
PO2 BLDA: 75 MMHG (ref 80–100)
PO2 BLDA: 76 MMHG (ref 80–100)
PO2 BLDA: 76 MMHG (ref 80–100)
PO2 BLDA: 77 MMHG (ref 80–100)
PO2 BLDA: 78 MMHG (ref 80–100)
PO2 BLDA: 79 MMHG (ref 80–100)
PO2 BLDA: 80 MMHG (ref 80–100)
PO2 BLDA: 81 MMHG (ref 80–100)
PO2 BLDA: 82 MMHG (ref 80–100)
PO2 BLDA: 82 MMHG (ref 80–100)
PO2 BLDA: 87 MMHG (ref 80–100)
PO2 BLDA: 88 MMHG (ref 80–100)
PO2 BLDA: 89 MMHG (ref 80–100)
PO2 BLDA: 91 MMHG (ref 80–100)
PO2 BLDA: 93 MMHG (ref 80–100)
PO2 BLDA: 94 MMHG (ref 80–100)
PO2 BLDA: 95 MMHG (ref 80–100)
PO2 BLDA: 96 MMHG (ref 80–100)
PO2 BLDA: 99 MMHG (ref 80–100)
POC BE: -1 MMOL/L
POC BE: -10 MMOL/L
POC BE: -11 MMOL/L
POC BE: -11 MMOL/L
POC BE: -13 MMOL/L
POC BE: -15 MMOL/L
POC BE: -16 MMOL/L
POC BE: -2 MMOL/L
POC BE: -3 MMOL/L
POC BE: -4 MMOL/L
POC BE: -5 MMOL/L
POC BE: -6 MMOL/L
POC BE: -7 MMOL/L
POC BE: -8 MMOL/L
POC BE: -9 MMOL/L
POC BE: 0 MMOL/L
POC BE: 1 MMOL/L
POC BE: 1 MMOL/L
POC BE: 2 MMOL/L
POC BE: 3 MMOL/L
POC BE: 4 MMOL/L
POC BE: 5 MMOL/L
POC BE: 7 MMOL/L
POC IONIZED CALCIUM: 0.74 MMOL/L (ref 1.06–1.42)
POC IONIZED CALCIUM: 0.77 MMOL/L (ref 1.06–1.42)
POC IONIZED CALCIUM: 0.82 MMOL/L (ref 1.06–1.42)
POC IONIZED CALCIUM: 0.83 MMOL/L (ref 1.06–1.42)
POC IONIZED CALCIUM: 0.87 MMOL/L (ref 1.06–1.42)
POC IONIZED CALCIUM: 0.89 MMOL/L (ref 1.06–1.42)
POC IONIZED CALCIUM: 0.9 MMOL/L (ref 1.06–1.42)
POC IONIZED CALCIUM: 0.91 MMOL/L (ref 1.06–1.42)
POC IONIZED CALCIUM: 0.93 MMOL/L (ref 1.06–1.42)
POC IONIZED CALCIUM: 0.97 MMOL/L (ref 1.06–1.42)
POC IONIZED CALCIUM: 0.99 MMOL/L (ref 1.06–1.42)
POC IONIZED CALCIUM: 1.01 MMOL/L (ref 1.06–1.42)
POC IONIZED CALCIUM: 1.03 MMOL/L (ref 1.06–1.42)
POC IONIZED CALCIUM: 1.04 MMOL/L (ref 1.06–1.42)
POC IONIZED CALCIUM: 1.1 MMOL/L (ref 1.06–1.42)
POC IONIZED CALCIUM: 1.1 MMOL/L (ref 1.06–1.42)
POC IONIZED CALCIUM: 1.11 MMOL/L (ref 1.06–1.42)
POC IONIZED CALCIUM: 1.14 MMOL/L (ref 1.06–1.42)
POC IONIZED CALCIUM: 1.16 MMOL/L (ref 1.06–1.42)
POC IONIZED CALCIUM: 1.22 MMOL/L (ref 1.06–1.42)
POC IONIZED CALCIUM: 1.22 MMOL/L (ref 1.06–1.42)
POC IONIZED CALCIUM: 1.23 MMOL/L (ref 1.06–1.42)
POC IONIZED CALCIUM: 1.36 MMOL/L (ref 1.06–1.42)
POC PCO2 TEMP: 18.1 MMHG
POC PH TEMP: 7.66
POC PO2 TEMP: 15 MMHG
POC SATURATED O2: 100 % (ref 95–100)
POC SATURATED O2: 45 % (ref 95–100)
POC SATURATED O2: 49 % (ref 95–100)
POC SATURATED O2: 51 % (ref 95–100)
POC SATURATED O2: 53 % (ref 95–100)
POC SATURATED O2: 53 % (ref 95–100)
POC SATURATED O2: 55 % (ref 95–100)
POC SATURATED O2: 55 % (ref 95–100)
POC SATURATED O2: 58 % (ref 95–100)
POC SATURATED O2: 61 % (ref 95–100)
POC SATURATED O2: 63 % (ref 95–100)
POC SATURATED O2: 64 % (ref 95–100)
POC SATURATED O2: 73 % (ref 95–100)
POC SATURATED O2: 79 % (ref 95–100)
POC SATURATED O2: 79 % (ref 95–100)
POC SATURATED O2: 84 % (ref 95–100)
POC SATURATED O2: 86 % (ref 95–100)
POC SATURATED O2: 87 % (ref 95–100)
POC SATURATED O2: 88 % (ref 95–100)
POC SATURATED O2: 88 % (ref 95–100)
POC SATURATED O2: 89 % (ref 95–100)
POC SATURATED O2: 89 % (ref 95–100)
POC SATURATED O2: 90 % (ref 95–100)
POC SATURATED O2: 91 % (ref 95–100)
POC SATURATED O2: 92 % (ref 95–100)
POC SATURATED O2: 93 % (ref 95–100)
POC SATURATED O2: 94 % (ref 95–100)
POC SATURATED O2: 95 % (ref 95–100)
POC SATURATED O2: 96 % (ref 95–100)
POC SATURATED O2: 97 % (ref 95–100)
POC SATURATED O2: 98 % (ref 95–100)
POC SATURATED O2: 99 % (ref 95–100)
POC TCO2: 15 MMOL/L (ref 23–27)
POC TCO2: 16 MMOL/L (ref 23–27)
POC TCO2: 17 MMOL/L (ref 23–27)
POC TCO2: 18 MMOL/L (ref 23–27)
POC TCO2: 19 MMOL/L (ref 23–27)
POC TCO2: 19 MMOL/L (ref 23–27)
POC TCO2: 19 MMOL/L (ref 24–29)
POC TCO2: 20 MMOL/L (ref 23–27)
POC TCO2: 21 MMOL/L (ref 23–27)
POC TCO2: 22 MMOL/L (ref 23–27)
POC TCO2: 22 MMOL/L (ref 24–29)
POC TCO2: 23 MMOL/L (ref 23–27)
POC TCO2: 24 MMOL/L (ref 23–27)
POC TCO2: 24 MMOL/L (ref 24–29)
POC TCO2: 25 MMOL/L (ref 23–27)
POC TCO2: 25 MMOL/L (ref 24–29)
POC TCO2: 26 MMOL/L (ref 23–27)
POC TCO2: 26 MMOL/L (ref 24–29)
POC TCO2: 27 MMOL/L (ref 23–27)
POC TCO2: 27 MMOL/L (ref 24–29)
POC TCO2: 27 MMOL/L (ref 24–29)
POC TCO2: 28 MMOL/L (ref 23–27)
POC TCO2: 28 MMOL/L (ref 24–29)
POC TCO2: 28 MMOL/L (ref 24–29)
POC TCO2: 29 MMOL/L (ref 23–27)
POC TCO2: 30 MMOL/L (ref 23–27)
POC TCO2: 30 MMOL/L (ref 24–29)
POC TCO2: 30 MMOL/L (ref 24–29)
POC TCO2: 31 MMOL/L (ref 23–27)
POC TCO2: 31 MMOL/L (ref 24–29)
POC TCO2: 32 MMOL/L (ref 23–27)
POC TCO2: 32 MMOL/L (ref 23–27)
POC TCO2: 32 MMOL/L (ref 24–29)
POC TEMPERATURE: ABNORMAL
POCT GLUCOSE: 103 MG/DL (ref 70–110)
POCT GLUCOSE: 106 MG/DL (ref 70–110)
POCT GLUCOSE: 107 MG/DL (ref 70–110)
POCT GLUCOSE: 107 MG/DL (ref 70–110)
POCT GLUCOSE: 109 MG/DL (ref 70–110)
POCT GLUCOSE: 111 MG/DL (ref 70–110)
POCT GLUCOSE: 115 MG/DL (ref 70–110)
POCT GLUCOSE: 116 MG/DL (ref 70–110)
POCT GLUCOSE: 117 MG/DL (ref 70–110)
POCT GLUCOSE: 117 MG/DL (ref 70–110)
POCT GLUCOSE: 119 MG/DL (ref 70–110)
POCT GLUCOSE: 120 MG/DL (ref 70–110)
POCT GLUCOSE: 120 MG/DL (ref 70–110)
POCT GLUCOSE: 121 MG/DL (ref 70–110)
POCT GLUCOSE: 122 MG/DL (ref 70–110)
POCT GLUCOSE: 122 MG/DL (ref 70–110)
POCT GLUCOSE: 123 MG/DL (ref 70–110)
POCT GLUCOSE: 124 MG/DL (ref 70–110)
POCT GLUCOSE: 125 MG/DL (ref 70–110)
POCT GLUCOSE: 126 MG/DL (ref 70–110)
POCT GLUCOSE: 126 MG/DL (ref 70–110)
POCT GLUCOSE: 127 MG/DL (ref 70–110)
POCT GLUCOSE: 128 MG/DL (ref 70–110)
POCT GLUCOSE: 128 MG/DL (ref 70–110)
POCT GLUCOSE: 129 MG/DL (ref 70–110)
POCT GLUCOSE: 130 MG/DL (ref 70–110)
POCT GLUCOSE: 131 MG/DL (ref 70–110)
POCT GLUCOSE: 132 MG/DL (ref 70–110)
POCT GLUCOSE: 133 MG/DL (ref 70–110)
POCT GLUCOSE: 134 MG/DL (ref 70–110)
POCT GLUCOSE: 135 MG/DL (ref 70–110)
POCT GLUCOSE: 137 MG/DL (ref 70–110)
POCT GLUCOSE: 138 MG/DL (ref 70–110)
POCT GLUCOSE: 138 MG/DL (ref 70–110)
POCT GLUCOSE: 139 MG/DL (ref 70–110)
POCT GLUCOSE: 140 MG/DL (ref 70–110)
POCT GLUCOSE: 141 MG/DL (ref 70–110)
POCT GLUCOSE: 142 MG/DL (ref 70–110)
POCT GLUCOSE: 143 MG/DL (ref 70–110)
POCT GLUCOSE: 144 MG/DL (ref 70–110)
POCT GLUCOSE: 145 MG/DL (ref 70–110)
POCT GLUCOSE: 146 MG/DL (ref 70–110)
POCT GLUCOSE: 148 MG/DL (ref 70–110)
POCT GLUCOSE: 150 MG/DL (ref 70–110)
POCT GLUCOSE: 151 MG/DL (ref 70–110)
POCT GLUCOSE: 151 MG/DL (ref 70–110)
POCT GLUCOSE: 152 MG/DL (ref 70–110)
POCT GLUCOSE: 153 MG/DL (ref 70–110)
POCT GLUCOSE: 154 MG/DL (ref 70–110)
POCT GLUCOSE: 155 MG/DL (ref 70–110)
POCT GLUCOSE: 157 MG/DL (ref 70–110)
POCT GLUCOSE: 158 MG/DL (ref 70–110)
POCT GLUCOSE: 160 MG/DL (ref 70–110)
POCT GLUCOSE: 161 MG/DL (ref 70–110)
POCT GLUCOSE: 162 MG/DL (ref 70–110)
POCT GLUCOSE: 162 MG/DL (ref 70–110)
POCT GLUCOSE: 163 MG/DL (ref 70–110)
POCT GLUCOSE: 164 MG/DL (ref 70–110)
POCT GLUCOSE: 164 MG/DL (ref 70–110)
POCT GLUCOSE: 165 MG/DL (ref 70–110)
POCT GLUCOSE: 165 MG/DL (ref 70–110)
POCT GLUCOSE: 166 MG/DL (ref 70–110)
POCT GLUCOSE: 167 MG/DL (ref 70–110)
POCT GLUCOSE: 168 MG/DL (ref 70–110)
POCT GLUCOSE: 169 MG/DL (ref 70–110)
POCT GLUCOSE: 171 MG/DL (ref 70–110)
POCT GLUCOSE: 171 MG/DL (ref 70–110)
POCT GLUCOSE: 172 MG/DL (ref 70–110)
POCT GLUCOSE: 175 MG/DL (ref 70–110)
POCT GLUCOSE: 176 MG/DL (ref 70–110)
POCT GLUCOSE: 177 MG/DL (ref 70–110)
POCT GLUCOSE: 178 MG/DL (ref 70–110)
POCT GLUCOSE: 179 MG/DL (ref 70–110)
POCT GLUCOSE: 179 MG/DL (ref 70–110)
POCT GLUCOSE: 182 MG/DL (ref 70–110)
POCT GLUCOSE: 182 MG/DL (ref 70–110)
POCT GLUCOSE: 184 MG/DL (ref 70–110)
POCT GLUCOSE: 184 MG/DL (ref 70–110)
POCT GLUCOSE: 186 MG/DL (ref 70–110)
POCT GLUCOSE: 188 MG/DL (ref 70–110)
POCT GLUCOSE: 189 MG/DL (ref 70–110)
POCT GLUCOSE: 189 MG/DL (ref 70–110)
POCT GLUCOSE: 191 MG/DL (ref 70–110)
POCT GLUCOSE: 191 MG/DL (ref 70–110)
POCT GLUCOSE: 192 MG/DL (ref 70–110)
POCT GLUCOSE: 192 MG/DL (ref 70–110)
POCT GLUCOSE: 196 MG/DL (ref 70–110)
POCT GLUCOSE: 196 MG/DL (ref 70–110)
POCT GLUCOSE: 197 MG/DL (ref 70–110)
POCT GLUCOSE: 198 MG/DL (ref 70–110)
POCT GLUCOSE: 198 MG/DL (ref 70–110)
POCT GLUCOSE: 201 MG/DL (ref 70–110)
POCT GLUCOSE: 201 MG/DL (ref 70–110)
POCT GLUCOSE: 203 MG/DL (ref 70–110)
POCT GLUCOSE: 203 MG/DL (ref 70–110)
POCT GLUCOSE: 204 MG/DL (ref 70–110)
POCT GLUCOSE: 205 MG/DL (ref 70–110)
POCT GLUCOSE: 206 MG/DL (ref 70–110)
POCT GLUCOSE: 206 MG/DL (ref 70–110)
POCT GLUCOSE: 208 MG/DL (ref 70–110)
POCT GLUCOSE: 209 MG/DL (ref 70–110)
POCT GLUCOSE: 210 MG/DL (ref 70–110)
POCT GLUCOSE: 211 MG/DL (ref 70–110)
POCT GLUCOSE: 211 MG/DL (ref 70–110)
POCT GLUCOSE: 212 MG/DL (ref 70–110)
POCT GLUCOSE: 212 MG/DL (ref 70–110)
POCT GLUCOSE: 214 MG/DL (ref 70–110)
POCT GLUCOSE: 218 MG/DL (ref 70–110)
POCT GLUCOSE: 221 MG/DL (ref 70–110)
POCT GLUCOSE: 224 MG/DL (ref 70–110)
POCT GLUCOSE: 225 MG/DL (ref 70–110)
POCT GLUCOSE: 227 MG/DL (ref 70–110)
POCT GLUCOSE: 228 MG/DL (ref 70–110)
POCT GLUCOSE: 229 MG/DL (ref 70–110)
POCT GLUCOSE: 231 MG/DL (ref 70–110)
POCT GLUCOSE: 234 MG/DL (ref 70–110)
POCT GLUCOSE: 235 MG/DL (ref 70–110)
POCT GLUCOSE: 235 MG/DL (ref 70–110)
POCT GLUCOSE: 238 MG/DL (ref 70–110)
POCT GLUCOSE: 242 MG/DL (ref 70–110)
POCT GLUCOSE: 242 MG/DL (ref 70–110)
POCT GLUCOSE: 243 MG/DL (ref 70–110)
POCT GLUCOSE: 243 MG/DL (ref 70–110)
POCT GLUCOSE: 244 MG/DL (ref 70–110)
POCT GLUCOSE: 245 MG/DL (ref 70–110)
POCT GLUCOSE: 246 MG/DL (ref 70–110)
POCT GLUCOSE: 246 MG/DL (ref 70–110)
POCT GLUCOSE: 247 MG/DL (ref 70–110)
POCT GLUCOSE: 249 MG/DL (ref 70–110)
POCT GLUCOSE: 250 MG/DL (ref 70–110)
POCT GLUCOSE: 252 MG/DL (ref 70–110)
POCT GLUCOSE: 254 MG/DL (ref 70–110)
POCT GLUCOSE: 256 MG/DL (ref 70–110)
POCT GLUCOSE: 258 MG/DL (ref 70–110)
POCT GLUCOSE: 258 MG/DL (ref 70–110)
POCT GLUCOSE: 261 MG/DL (ref 70–110)
POCT GLUCOSE: 262 MG/DL (ref 70–110)
POCT GLUCOSE: 263 MG/DL (ref 70–110)
POCT GLUCOSE: 264 MG/DL (ref 70–110)
POCT GLUCOSE: 267 MG/DL (ref 70–110)
POCT GLUCOSE: 268 MG/DL (ref 70–110)
POCT GLUCOSE: 268 MG/DL (ref 70–110)
POCT GLUCOSE: 274 MG/DL (ref 70–110)
POCT GLUCOSE: 274 MG/DL (ref 70–110)
POCT GLUCOSE: 275 MG/DL (ref 70–110)
POCT GLUCOSE: 275 MG/DL (ref 70–110)
POCT GLUCOSE: 276 MG/DL (ref 70–110)
POCT GLUCOSE: 276 MG/DL (ref 70–110)
POCT GLUCOSE: 278 MG/DL (ref 70–110)
POCT GLUCOSE: 278 MG/DL (ref 70–110)
POCT GLUCOSE: 282 MG/DL (ref 70–110)
POCT GLUCOSE: 284 MG/DL (ref 70–110)
POCT GLUCOSE: 286 MG/DL (ref 70–110)
POCT GLUCOSE: 290 MG/DL (ref 70–110)
POCT GLUCOSE: 296 MG/DL (ref 70–110)
POCT GLUCOSE: 298 MG/DL (ref 70–110)
POCT GLUCOSE: 298 MG/DL (ref 70–110)
POCT GLUCOSE: 302 MG/DL (ref 70–110)
POCT GLUCOSE: 302 MG/DL (ref 70–110)
POCT GLUCOSE: 304 MG/DL (ref 70–110)
POCT GLUCOSE: 305 MG/DL (ref 70–110)
POCT GLUCOSE: 305 MG/DL (ref 70–110)
POCT GLUCOSE: 312 MG/DL (ref 70–110)
POCT GLUCOSE: 315 MG/DL (ref 70–110)
POCT GLUCOSE: 318 MG/DL (ref 70–110)
POCT GLUCOSE: 320 MG/DL (ref 70–110)
POCT GLUCOSE: 329 MG/DL (ref 70–110)
POCT GLUCOSE: 339 MG/DL (ref 70–110)
POCT GLUCOSE: 341 MG/DL (ref 70–110)
POCT GLUCOSE: 354 MG/DL (ref 70–110)
POCT GLUCOSE: 357 MG/DL (ref 70–110)
POCT GLUCOSE: 368 MG/DL (ref 70–110)
POCT GLUCOSE: 372 MG/DL (ref 70–110)
POCT GLUCOSE: 377 MG/DL (ref 70–110)
POCT GLUCOSE: 390 MG/DL (ref 70–110)
POCT GLUCOSE: 394 MG/DL (ref 70–110)
POCT GLUCOSE: 399 MG/DL (ref 70–110)
POCT GLUCOSE: 404 MG/DL (ref 70–110)
POCT GLUCOSE: 490 MG/DL (ref 70–110)
POCT GLUCOSE: 60 MG/DL (ref 70–110)
POCT GLUCOSE: 69 MG/DL (ref 70–110)
POCT GLUCOSE: 77 MG/DL (ref 70–110)
POCT GLUCOSE: 78 MG/DL (ref 70–110)
POCT GLUCOSE: 91 MG/DL (ref 70–110)
POCT GLUCOSE: 92 MG/DL (ref 70–110)
POCT GLUCOSE: 94 MG/DL (ref 70–110)
POCT GLUCOSE: 95 MG/DL (ref 70–110)
POCT GLUCOSE: 95 MG/DL (ref 70–110)
POCT GLUCOSE: 96 MG/DL (ref 70–110)
POCT GLUCOSE: 96 MG/DL (ref 70–110)
POCT GLUCOSE: 97 MG/DL (ref 70–110)
POCT GLUCOSE: 99 MG/DL (ref 70–110)
POIKILOCYTOSIS BLD QL SMEAR: ABNORMAL
POIKILOCYTOSIS BLD QL SMEAR: SLIGHT
POLYCHROMASIA BLD QL SMEAR: ABNORMAL
POTASSIUM BLD-SCNC: 3.1 MMOL/L (ref 3.5–5.1)
POTASSIUM BLD-SCNC: 3.2 MMOL/L (ref 3.5–5.1)
POTASSIUM BLD-SCNC: 3.2 MMOL/L (ref 3.5–5.1)
POTASSIUM BLD-SCNC: 3.4 MMOL/L (ref 3.5–5.1)
POTASSIUM BLD-SCNC: 3.5 MMOL/L (ref 3.5–5.1)
POTASSIUM BLD-SCNC: 3.6 MMOL/L (ref 3.5–5.1)
POTASSIUM BLD-SCNC: 3.7 MMOL/L (ref 3.5–5.1)
POTASSIUM BLD-SCNC: 3.9 MMOL/L (ref 3.5–5.1)
POTASSIUM BLD-SCNC: 4 MMOL/L (ref 3.5–5.1)
POTASSIUM BLD-SCNC: 4.1 MMOL/L (ref 3.5–5.1)
POTASSIUM BLD-SCNC: 4.2 MMOL/L (ref 3.5–5.1)
POTASSIUM BLD-SCNC: 4.2 MMOL/L (ref 3.5–5.1)
POTASSIUM BLD-SCNC: 4.8 MMOL/L (ref 3.5–5.1)
POTASSIUM BLD-SCNC: 5.3 MMOL/L (ref 3.5–5.1)
POTASSIUM BLD-SCNC: 5.3 MMOL/L (ref 3.5–5.1)
POTASSIUM SERPL-SCNC: 3.2 MMOL/L (ref 3.5–5.1)
POTASSIUM SERPL-SCNC: 3.4 MMOL/L (ref 3.5–5.1)
POTASSIUM SERPL-SCNC: 3.5 MMOL/L (ref 3.5–5.1)
POTASSIUM SERPL-SCNC: 3.6 MMOL/L (ref 3.5–5.1)
POTASSIUM SERPL-SCNC: 3.7 MMOL/L (ref 3.5–5.1)
POTASSIUM SERPL-SCNC: 3.7 MMOL/L (ref 3.5–5.1)
POTASSIUM SERPL-SCNC: 3.8 MMOL/L (ref 3.5–5.1)
POTASSIUM SERPL-SCNC: 3.8 MMOL/L (ref 3.5–5.1)
POTASSIUM SERPL-SCNC: 3.9 MMOL/L (ref 3.5–5.1)
POTASSIUM SERPL-SCNC: 4 MMOL/L (ref 3.5–5.1)
POTASSIUM SERPL-SCNC: 4.1 MMOL/L (ref 3.5–5.1)
POTASSIUM SERPL-SCNC: 4.2 MMOL/L (ref 3.5–5.1)
POTASSIUM SERPL-SCNC: 4.3 MMOL/L (ref 3.5–5.1)
POTASSIUM SERPL-SCNC: 4.4 MMOL/L (ref 3.5–5.1)
POTASSIUM SERPL-SCNC: 4.5 MMOL/L (ref 3.5–5.1)
POTASSIUM SERPL-SCNC: 4.6 MMOL/L (ref 3.5–5.1)
POTASSIUM SERPL-SCNC: 4.7 MMOL/L (ref 3.5–5.1)
POTASSIUM SERPL-SCNC: 4.8 MMOL/L (ref 3.5–5.1)
POTASSIUM SERPL-SCNC: 4.9 MMOL/L (ref 3.5–5.1)
POTASSIUM SERPL-SCNC: 5 MMOL/L
POTASSIUM SERPL-SCNC: 5 MMOL/L (ref 3.5–5.1)
POTASSIUM SERPL-SCNC: 5.1 MMOL/L (ref 3.5–5.1)
POTASSIUM SERPL-SCNC: 5.2 MMOL/L (ref 3.5–5.1)
POTASSIUM SERPL-SCNC: 5.3 MMOL/L
POTASSIUM SERPL-SCNC: 5.3 MMOL/L (ref 3.5–5.1)
POTASSIUM SERPL-SCNC: 5.5 MMOL/L (ref 3.5–5.1)
POTASSIUM SERPL-SCNC: 5.7 MMOL/L (ref 3.5–5.1)
PRE FEV1 FVC: 40
PRE FEV1 FVC: 42
PRE FEV1 FVC: 42
PRE FEV1 FVC: 50
PRE FEV1 FVC: 53
PRE FEV1 FVC: 57
PRE FEV1 FVC: 88
PRE FEV1: 0.59
PRE FEV1: 0.6
PRE FEV1: 0.61
PRE FEV1: 0.69
PRE FEV1: 0.71
PRE FEV1: 0.71
PRE FEV1: 1.27
PRE FVC: 1.19
PRE FVC: 1.22
PRE FVC: 1.34
PRE FVC: 1.42
PRE FVC: 1.45
PRE FVC: 1.45
PRE FVC: 1.79
PREDICTED FEV1 FVC: 88
PREDICTED FEV1: 2.78
PREDICTED FEV1: 2.8
PREDICTED FVC: 3.21
PREDICTED FVC: 3.22
PROCALCITONIN SERPL IA-MCNC: 0.03 NG/ML
PROCALCITONIN SERPL IA-MCNC: 0.8 NG/ML
PROCALCITONIN SERPL IA-MCNC: 79.71 NG/ML
PROT SERPL-MCNC: 3.4 G/DL (ref 6–8.4)
PROT SERPL-MCNC: 3.6 G/DL (ref 6–8.4)
PROT SERPL-MCNC: 3.6 G/DL (ref 6–8.4)
PROT SERPL-MCNC: 3.7 G/DL (ref 6–8.4)
PROT SERPL-MCNC: 3.8 G/DL (ref 6–8.4)
PROT SERPL-MCNC: 3.8 G/DL (ref 6–8.4)
PROT SERPL-MCNC: 3.9 G/DL (ref 6–8.4)
PROT SERPL-MCNC: 4 G/DL (ref 6–8.4)
PROT SERPL-MCNC: 4.1 G/DL (ref 6–8.4)
PROT SERPL-MCNC: 4.3 G/DL (ref 6–8.4)
PROT SERPL-MCNC: 4.6 G/DL (ref 6–8.4)
PROT SERPL-MCNC: 4.7 G/DL (ref 6–8.4)
PROT SERPL-MCNC: 4.7 G/DL (ref 6–8.4)
PROT SERPL-MCNC: 4.8 G/DL (ref 6–8.4)
PROT SERPL-MCNC: 4.9 G/DL (ref 6–8.4)
PROT SERPL-MCNC: 5 G/DL (ref 6–8.4)
PROT SERPL-MCNC: 5 G/DL (ref 6–8.4)
PROT SERPL-MCNC: 5.1 G/DL (ref 6–8.4)
PROT SERPL-MCNC: 5.2 G/DL (ref 6–8.4)
PROT SERPL-MCNC: 5.3 G/DL (ref 6–8.4)
PROT SERPL-MCNC: 5.4 G/DL (ref 6–8.4)
PROT SERPL-MCNC: 5.5 G/DL (ref 6–8.4)
PROT SERPL-MCNC: 5.6 G/DL (ref 6–8.4)
PROT SERPL-MCNC: 5.7 G/DL (ref 6–8.4)
PROT SERPL-MCNC: 5.7 G/DL (ref 6–8.4)
PROT SERPL-MCNC: 5.8 G/DL (ref 6–8.4)
PROT SERPL-MCNC: 5.8 G/DL (ref 6–8.4)
PROT SERPL-MCNC: 5.9 G/DL (ref 6–8.4)
PROT SERPL-MCNC: 6 G/DL (ref 6–8.4)
PROT SERPL-MCNC: 6.1 G/DL (ref 6–8.4)
PROT SERPL-MCNC: 6.1 G/DL (ref 6–8.4)
PROT SERPL-MCNC: 6.2 G/DL (ref 6–8.4)
PROT SERPL-MCNC: 6.3 G/DL (ref 6–8.4)
PROT SERPL-MCNC: 6.3 G/DL (ref 6–8.4)
PROT SERPL-MCNC: 6.4 G/DL (ref 6–8.4)
PROT SERPL-MCNC: 6.5 G/DL (ref 6–8.4)
PROT SERPL-MCNC: 6.6 G/DL (ref 6–8.4)
PROT SERPL-MCNC: 6.8 G/DL (ref 6–8.4)
PROT SERPL-MCNC: 6.9 G/DL (ref 6–8.4)
PROT SERPL-MCNC: 7 G/DL (ref 6–8.4)
PROT SERPL-MCNC: 7 G/DL (ref 6–8.4)
PROT SERPL-MCNC: 7.1 G/DL (ref 6–8.4)
PROT SERPL-MCNC: 7.2 G/DL (ref 6–8.4)
PROT SERPL-MCNC: 7.2 G/DL (ref 6–8.4)
PROT SERPL-MCNC: 7.3 G/DL
PROT SERPL-MCNC: 7.3 G/DL (ref 6–8.4)
PROT SERPL-MCNC: 7.3 G/DL (ref 6–8.4)
PROT SERPL-MCNC: 7.5 G/DL (ref 6–8.4)
PROT SERPL-MCNC: 7.7 G/DL (ref 6–8.4)
PROT SERPL-MCNC: 7.7 G/DL (ref 6–8.4)
PROT SERPL-MCNC: 7.9 G/DL
PROT SERPL-MCNC: 8.1 G/DL (ref 6–8.4)
PROT SERPL-MCNC: 8.3 G/DL (ref 6–8.4)
PROT SERPL-MCNC: 8.6 G/DL (ref 6–8.4)
PROT UR QL STRIP: NEGATIVE
PROT UR QL STRIP: NEGATIVE
PROTHROMBIN TIME: 10.6 SEC (ref 9–12.5)
PROTHROMBIN TIME: 10.6 SEC (ref 9–12.5)
PROTHROMBIN TIME: 11.1 SEC (ref 9–12.5)
PROTHROMBIN TIME: 11.2 SEC (ref 9–12.5)
PROTHROMBIN TIME: 11.3 SEC (ref 9–12.5)
PROTHROMBIN TIME: 11.5 SEC (ref 9–12.5)
PROTHROMBIN TIME: 12.2 SEC (ref 9–12.5)
PROTHROMBIN TIME: 12.5 SEC (ref 9–12.5)
PROTHROMBIN TIME: 13.1 SEC (ref 9–12.5)
PROTHROMBIN TIME: 13.4 SEC (ref 9–12.5)
PROTHROMBIN TIME: 13.9 SEC (ref 9–12.5)
PROTHROMBIN TIME: 14.8 SEC (ref 9–12.5)
PROTHROMBIN TIME: 15.6 SEC (ref 9–12.5)
PROTHROMBIN TIME: 15.9 SEC (ref 9–12.5)
PROTHROMBIN TIME: 16.1 SEC (ref 9–12.5)
PROTHROMBIN TIME: 16.2 SEC (ref 9–12.5)
PROTHROMBIN TIME: 16.3 SEC (ref 9–12.5)
PROTHROMBIN TIME: 17.8 SEC (ref 9–12.5)
PROTHROMBIN TIME: 18.5 SEC (ref 9–12.5)
PROTHROMBIN TIME: 21.1 SEC (ref 9–12.5)
PROTHROMBIN TIME: 9.7 SEC (ref 9–12.5)
PROVIDER CREDENTIALS: ABNORMAL
PROVIDER NOTIFIED: ABNORMAL
PS: 10
PS: 5
RA MAJOR: 2.7 CM
RA MAJOR: 4 CM
RA PRESSURE: 3 MMHG
RA WIDTH: 2.09 CM
RA WIDTH: 2.9 CM
RBC # BLD AUTO: 2.11 M/UL (ref 4–5.4)
RBC # BLD AUTO: 2.34 M/UL (ref 4–5.4)
RBC # BLD AUTO: 2.38 M/UL (ref 4–5.4)
RBC # BLD AUTO: 2.39 M/UL (ref 4–5.4)
RBC # BLD AUTO: 2.4 M/UL (ref 4–5.4)
RBC # BLD AUTO: 2.45 M/UL (ref 4–5.4)
RBC # BLD AUTO: 2.49 M/UL (ref 4–5.4)
RBC # BLD AUTO: 2.52 M/UL (ref 4–5.4)
RBC # BLD AUTO: 2.54 M/UL (ref 4–5.4)
RBC # BLD AUTO: 2.55 M/UL (ref 4–5.4)
RBC # BLD AUTO: 2.56 M/UL (ref 4–5.4)
RBC # BLD AUTO: 2.57 M/UL (ref 4–5.4)
RBC # BLD AUTO: 2.62 M/UL (ref 4–5.4)
RBC # BLD AUTO: 2.63 M/UL (ref 4–5.4)
RBC # BLD AUTO: 2.64 M/UL (ref 4–5.4)
RBC # BLD AUTO: 2.66 M/UL (ref 4–5.4)
RBC # BLD AUTO: 2.68 M/UL (ref 4–5.4)
RBC # BLD AUTO: 2.71 M/UL (ref 4–5.4)
RBC # BLD AUTO: 2.72 M/UL (ref 4–5.4)
RBC # BLD AUTO: 2.73 M/UL (ref 4–5.4)
RBC # BLD AUTO: 2.73 M/UL (ref 4–5.4)
RBC # BLD AUTO: 2.75 M/UL (ref 4–5.4)
RBC # BLD AUTO: 2.76 M/UL (ref 4–5.4)
RBC # BLD AUTO: 2.77 M/UL (ref 4–5.4)
RBC # BLD AUTO: 2.78 M/UL (ref 4–5.4)
RBC # BLD AUTO: 2.79 M/UL (ref 4–5.4)
RBC # BLD AUTO: 2.82 M/UL (ref 4–5.4)
RBC # BLD AUTO: 2.82 M/UL (ref 4–5.4)
RBC # BLD AUTO: 2.9 M/UL (ref 4–5.4)
RBC # BLD AUTO: 2.9 M/UL (ref 4–5.4)
RBC # BLD AUTO: 2.91 M/UL (ref 4–5.4)
RBC # BLD AUTO: 2.92 M/UL (ref 4–5.4)
RBC # BLD AUTO: 2.93 M/UL (ref 4–5.4)
RBC # BLD AUTO: 2.94 M/UL (ref 4–5.4)
RBC # BLD AUTO: 2.94 M/UL (ref 4–5.4)
RBC # BLD AUTO: 2.95 M/UL (ref 4–5.4)
RBC # BLD AUTO: 2.96 M/UL (ref 4–5.4)
RBC # BLD AUTO: 2.97 M/UL (ref 4–5.4)
RBC # BLD AUTO: 2.98 M/UL (ref 4–5.4)
RBC # BLD AUTO: 3 M/UL (ref 4–5.4)
RBC # BLD AUTO: 3.01 M/UL (ref 4–5.4)
RBC # BLD AUTO: 3.01 M/UL (ref 4–5.4)
RBC # BLD AUTO: 3.02 M/UL (ref 4–5.4)
RBC # BLD AUTO: 3.03 M/UL (ref 4–5.4)
RBC # BLD AUTO: 3.04 M/UL (ref 4–5.4)
RBC # BLD AUTO: 3.04 M/UL (ref 4–5.4)
RBC # BLD AUTO: 3.05 M/UL (ref 4–5.4)
RBC # BLD AUTO: 3.06 M/UL (ref 4–5.4)
RBC # BLD AUTO: 3.06 M/UL (ref 4–5.4)
RBC # BLD AUTO: 3.07 M/UL (ref 4–5.4)
RBC # BLD AUTO: 3.08 M/UL (ref 4–5.4)
RBC # BLD AUTO: 3.1 M/UL (ref 4–5.4)
RBC # BLD AUTO: 3.1 M/UL (ref 4–5.4)
RBC # BLD AUTO: 3.14 M/UL (ref 4–5.4)
RBC # BLD AUTO: 3.15 M/UL (ref 4–5.4)
RBC # BLD AUTO: 3.16 M/UL (ref 4–5.4)
RBC # BLD AUTO: 3.16 M/UL (ref 4–5.4)
RBC # BLD AUTO: 3.18 M/UL (ref 4–5.4)
RBC # BLD AUTO: 3.19 M/UL (ref 4–5.4)
RBC # BLD AUTO: 3.2 M/UL (ref 4–5.4)
RBC # BLD AUTO: 3.22 M/UL (ref 4–5.4)
RBC # BLD AUTO: 3.26 M/UL (ref 4–5.4)
RBC # BLD AUTO: 3.28 M/UL (ref 4–5.4)
RBC # BLD AUTO: 3.31 M/UL (ref 4–5.4)
RBC # BLD AUTO: 3.38 M/UL (ref 4–5.4)
RBC # BLD AUTO: 3.44 M/UL (ref 4–5.4)
RBC # BLD AUTO: 3.48 M/UL (ref 4–5.4)
RBC # BLD AUTO: 3.49 M/UL (ref 4–5.4)
RBC # BLD AUTO: 3.55 M/UL (ref 4–5.4)
RBC # BLD AUTO: 3.65 M/UL (ref 4–5.4)
RBC # BLD AUTO: 3.66 M/UL (ref 4–5.4)
RBC # BLD AUTO: 3.68 M/UL (ref 4–5.4)
RBC # BLD AUTO: 3.7 M/UL
RBC # BLD AUTO: 3.7 M/UL (ref 4–5.4)
RBC # BLD AUTO: 3.71 M/UL (ref 4–5.4)
RBC # BLD AUTO: 3.71 M/UL (ref 4–5.4)
RBC # BLD AUTO: 3.75 M/UL (ref 4–5.4)
RBC # BLD AUTO: 3.76 M/UL (ref 4–5.4)
RBC # BLD AUTO: 3.78 M/UL (ref 4–5.4)
RBC # BLD AUTO: 3.82 M/UL (ref 4–5.4)
RBC # BLD AUTO: 3.83 M/UL (ref 4–5.4)
RBC # BLD AUTO: 3.88 M/UL (ref 4–5.4)
RBC # BLD AUTO: 3.92 M/UL (ref 4–5.4)
RBC # BLD AUTO: 3.95 M/UL (ref 4–5.4)
RBC # BLD AUTO: 3.96 M/UL (ref 4–5.4)
RBC # BLD AUTO: 4.08 M/UL
RBC # BLD AUTO: 4.15 M/UL (ref 4–5.4)
RBC # BLD AUTO: 4.21 M/UL (ref 4–5.4)
RBC # BLD AUTO: 4.3 M/UL (ref 4–5.4)
RBC # BLD AUTO: 4.3 M/UL (ref 4–5.4)
RESPIRATORY INFECTION PANEL SOURCE: NORMAL
RH BLD: NORMAL
RSV RNA NPH QL NAA+NON-PROBE: NOT DETECTED
RV TISSUE DOPPLER FREE WALL SYSTOLIC VELOCITY 1 (APICAL 4 CHAMBER VIEW): 11 CM/S
RV+EV RNA NPH QL NAA+NON-PROBE: NOT DETECTED
RVP - ADENOVIRUS: NOT DETECTED
RVP - HUMAN METAPNEUMOVIRUS (HMPV): NOT DETECTED
RVP - INFLUENZA A: NOT DETECTED
RVP - INFLUENZA B: NOT DETECTED
RVP - RESPIRATORY SYNCTIAL VIRUS (RSV) A: NOT DETECTED
RVP - RESPIRATORY VIRAL PANEL, SOURCE: ABNORMAL
RVP - RESPIRATORY VIRAL PANEL, SOURCE: NORMAL
RVP - RHINOVIRUS: NOT DETECTED
SAMPLE: ABNORMAL
SAMPLE: NORMAL
SCHISTOCYTES BLD QL SMEAR: ABNORMAL
SCRFL TESTING DATE: NORMAL
SERUM COLLECTION DT - LUMINEX CLASS I: NORMAL
SERUM COLLECTION DT - LUMINEX CLASS I: NORMAL
SERUM COLLECTION DT - LUMINEX CLASS II: NORMAL
SERUM COLLECTION DT - LUMINEX CLASS II: NORMAL
SERUM COLLECTION DT - XM ALLO: NORMAL
SERUM COLLECTION DT - XM AUTO: NORMAL
SERUM COLLECTION DT - XM AUTO: NORMAL
SERUM COLLECTION DT: NORMAL
SICKLE CELLS BLD QL SMEAR: ABNORMAL
SINUS: 2.61 CM
SINUS: 3 CM
SITE: ABNORMAL
SITE: NORMAL
SMOOTH MUSCLE AB TITR SER IF: NORMAL {TITER}
SMUDGE CELLS BLD QL SMEAR: PRESENT
SMUDGE CELLS BLD QL SMEAR: PRESENT
SODIUM BLD-SCNC: 125 MMOL/L (ref 136–145)
SODIUM BLD-SCNC: 126 MMOL/L (ref 136–145)
SODIUM BLD-SCNC: 134 MMOL/L (ref 136–145)
SODIUM BLD-SCNC: 136 MMOL/L (ref 136–145)
SODIUM BLD-SCNC: 136 MMOL/L (ref 136–145)
SODIUM BLD-SCNC: 137 MMOL/L (ref 136–145)
SODIUM BLD-SCNC: 138 MMOL/L (ref 136–145)
SODIUM BLD-SCNC: 138 MMOL/L (ref 136–145)
SODIUM BLD-SCNC: 139 MMOL/L (ref 136–145)
SODIUM BLD-SCNC: 140 MMOL/L (ref 136–145)
SODIUM BLD-SCNC: 141 MMOL/L (ref 136–145)
SODIUM BLD-SCNC: 142 MMOL/L (ref 136–145)
SODIUM BLD-SCNC: 143 MMOL/L (ref 136–145)
SODIUM BLD-SCNC: 144 MMOL/L (ref 136–145)
SODIUM BLD-SCNC: 145 MMOL/L (ref 136–145)
SODIUM BLD-SCNC: 145 MMOL/L (ref 136–145)
SODIUM SERPL-SCNC: 125 MMOL/L (ref 136–145)
SODIUM SERPL-SCNC: 125 MMOL/L (ref 136–145)
SODIUM SERPL-SCNC: 127 MMOL/L (ref 136–145)
SODIUM SERPL-SCNC: 128 MMOL/L (ref 136–145)
SODIUM SERPL-SCNC: 129 MMOL/L (ref 136–145)
SODIUM SERPL-SCNC: 131 MMOL/L (ref 136–145)
SODIUM SERPL-SCNC: 133 MMOL/L (ref 136–145)
SODIUM SERPL-SCNC: 134 MMOL/L
SODIUM SERPL-SCNC: 134 MMOL/L (ref 136–145)
SODIUM SERPL-SCNC: 135 MMOL/L (ref 136–145)
SODIUM SERPL-SCNC: 136 MMOL/L
SODIUM SERPL-SCNC: 136 MMOL/L (ref 136–145)
SODIUM SERPL-SCNC: 137 MMOL/L (ref 136–145)
SODIUM SERPL-SCNC: 138 MMOL/L (ref 136–145)
SODIUM SERPL-SCNC: 139 MMOL/L (ref 136–145)
SODIUM SERPL-SCNC: 140 MMOL/L (ref 136–145)
SODIUM SERPL-SCNC: 141 MMOL/L (ref 136–145)
SODIUM SERPL-SCNC: 142 MMOL/L (ref 136–145)
SODIUM SERPL-SCNC: 142 MMOL/L (ref 136–145)
SODIUM SERPL-SCNC: 143 MMOL/L (ref 136–145)
SP GR UR STRIP: 1.01 (ref 1–1.03)
SP GR UR STRIP: 1.02 (ref 1–1.03)
SP02: 100
SP02: 40
SP02: 50
SP02: 80
SP02: 82
SP02: 82
SP02: 84
SP02: 85
SP02: 88
SP02: 90
SP02: 91
SP02: 92
SP02: 93
SP02: 95
SP02: 96
SP02: 97
SP02: 98
SP02: 99
SP02: 998
SPCL1 TESTING DATE: NORMAL
SPCL1 TESTING DATE: NORMAL
SPCL2 TESTING DATE: NORMAL
SPCL2 TESTING DATE: NORMAL
SPECIMEN SOURCE: NORMAL
SPHEROCYTES BLD QL SMEAR: ABNORMAL
SPLUA TESTING DATE: NORMAL
SPLUA TESTING DATE: NORMAL
SPONT RATE: 18
SPONT RATE: 20
SPONT RATE: 31
SPONT RATE: 36
SSDQB TESTING DATE: NORMAL
SSDRB TESTING DATE: NORMAL
SSOA TESTING DATE: NORMAL
SSOB TESTING DATE: NORMAL
SSOC TESTING DATE: NORMAL
SSODR TESTING DATE: NORMAL
STJ: 2.52 CM
T CELL RESULTS - XM ALLO: NEGATIVE
T CELL RESULTS - XM AUTO: NEGATIVE
T CELL RESULTS - XM AUTO: NEGATIVE
T MCS AVERAGE - XM ALLO: 32.75
T MCS AVERAGE - XM ALLO: 35.5
T MCS AVERAGE - XM ALLO: 5.5
T MCS AVERAGE - XM AUTO: 0.5
T MCS AVERAGE - XM AUTO: 40.5
TACROLIMUS BLD-MCNC: 1.9 NG/ML (ref 5–15)
TACROLIMUS BLD-MCNC: 10 NG/ML (ref 5–15)
TACROLIMUS BLD-MCNC: 10.9 NG/ML (ref 5–15)
TACROLIMUS BLD-MCNC: 11 NG/ML (ref 5–15)
TACROLIMUS BLD-MCNC: 11.3 NG/ML (ref 5–15)
TACROLIMUS BLD-MCNC: 14.3 NG/ML (ref 5–15)
TACROLIMUS BLD-MCNC: 2 NG/ML (ref 5–15)
TACROLIMUS BLD-MCNC: 2.1 NG/ML (ref 5–15)
TACROLIMUS BLD-MCNC: 2.1 NG/ML (ref 5–15)
TACROLIMUS BLD-MCNC: 2.4 NG/ML (ref 5–15)
TACROLIMUS BLD-MCNC: 2.6 NG/ML (ref 5–15)
TACROLIMUS BLD-MCNC: 2.7 NG/ML (ref 5–15)
TACROLIMUS BLD-MCNC: 20.8 NG/ML (ref 5–15)
TACROLIMUS BLD-MCNC: 3.1 NG/ML (ref 5–15)
TACROLIMUS BLD-MCNC: 3.4 NG/ML (ref 5–15)
TACROLIMUS BLD-MCNC: 3.4 NG/ML (ref 5–15)
TACROLIMUS BLD-MCNC: 3.5 NG/ML (ref 5–15)
TACROLIMUS BLD-MCNC: 3.9 NG/ML (ref 5–15)
TACROLIMUS BLD-MCNC: 3.9 NG/ML (ref 5–15)
TACROLIMUS BLD-MCNC: 4.5 NG/ML (ref 5–15)
TACROLIMUS BLD-MCNC: 5.2 NG/ML (ref 5–15)
TACROLIMUS BLD-MCNC: 5.3 NG/ML (ref 5–15)
TACROLIMUS BLD-MCNC: 5.6 NG/ML (ref 5–15)
TACROLIMUS BLD-MCNC: 6.2 NG/ML (ref 5–15)
TACROLIMUS BLD-MCNC: 6.2 NG/ML (ref 5–15)
TACROLIMUS BLD-MCNC: 6.3 NG/ML (ref 5–15)
TACROLIMUS BLD-MCNC: 6.8 NG/ML (ref 5–15)
TACROLIMUS BLD-MCNC: 7.2 NG/ML (ref 5–15)
TACROLIMUS BLD-MCNC: 7.5 NG/ML (ref 5–15)
TACROLIMUS BLD-MCNC: 7.7 NG/ML (ref 5–15)
TACROLIMUS BLD-MCNC: 7.9 NG/ML (ref 5–15)
TACROLIMUS BLD-MCNC: 8.2 NG/ML (ref 5–15)
TACROLIMUS BLD-MCNC: 8.4 NG/ML (ref 5–15)
TACROLIMUS BLD-MCNC: 8.5 NG/ML (ref 5–15)
TACROLIMUS BLD-MCNC: 9 NG/ML (ref 5–15)
TACROLIMUS BLD-MCNC: 9.5 NG/ML (ref 5–15)
TDI LATERAL: 0.06 M/S
TDI SEPTAL: 0.08 M/S
TDI: 0.07 M/S
TEM - ACINETOBACTER BAUMANNII: NOT DETECTED
TEM - BORDETELLA PERTUSSIS: NOT DETECTED
TEM - CHLAMYDOPHILA PNEUMONIAE: NOT DETECTED
TEM - KLEBSIELLA PNEUMONIAE: NOT DETECTED
TEM - MRSA: NOT DETECTED
TEM - MRSA: POSITIVE
TEM - MYCOPLASMA PNEUMONIAE: NOT DETECTED
TEM - NEISSERIA MENINGITIDIS: NOT DETECTED
TEM - PANTON-VALENTINE: NOT DETECTED
TEM - PSEUDOMONAS AERUGINOSA: NOT DETECTED
TEM - PSEUDOMONAS AERUGINOSA: POSITIVE
TEM - STAPHYLOCOCCUS AUREUS: NOT DETECTED
TEM - STREPTOCOCCUS PNEUMONIAE: NOT DETECTED
TEM - STREPTOCOCCUS PYOGENES A: NOT DETECTED
TEM- HAEMOPHILUS INFLUENZAE B: NOT DETECTED
TEM- HAEMOPHILUS INFLUENZAE: NOT DETECTED
TIME NOTIFIED: 1330
TIME NOTIFIED: 1652
TIME NOTIFIED: 935
TOBRAMYCIN SERPL-MCNC: 2.7 UG/ML
TOBRAMYCIN SERPL-MCNC: <0.2 UG/ML
TOXIC GRANULES BLD QL SMEAR: PRESENT
TR MAX PG: 27 MMHG
TRANS ERYTHROCYTES VOL PATIENT: NORMAL ML
TRANS PLATPHERESIS VOL PATIENT: NORMAL ML
TROPONIN I SERPL DL<=0.01 NG/ML-MCNC: 0.03 NG/ML (ref 0–0.03)
TROPONIN I SERPL DL<=0.01 NG/ML-MCNC: <0.006 NG/ML (ref 0–0.03)
TSH SERPL DL<=0.005 MIU/L-ACNC: 1.12 UIU/ML (ref 0.4–4)
TV REST PULMONARY ARTERY PRESSURE: 30 MMHG
TYSSO TESTING DATE: NORMAL
UNIT NUMBER: NORMAL
URN SPEC COLLECT METH UR: NORMAL
URN SPEC COLLECT METH UR: NORMAL
VANCOMYCIN SERPL-MCNC: 11 UG/ML
VANCOMYCIN SERPL-MCNC: 14.2 UG/ML
VANCOMYCIN SERPL-MCNC: 15.1 UG/ML
VANCOMYCIN SERPL-MCNC: 15.2 UG/ML
VANCOMYCIN SERPL-MCNC: 16 UG/ML
VANCOMYCIN SERPL-MCNC: 16.1 UG/ML
VANCOMYCIN SERPL-MCNC: 39.9 UG/ML
VANCOMYCIN SERPL-MCNC: 48.8 UG/ML
VANCOMYCIN TROUGH SERPL-MCNC: 13.6 UG/ML (ref 10–22)
VANCOMYCIN TROUGH SERPL-MCNC: 15.2 UG/ML (ref 10–22)
VANCOMYCIN TROUGH SERPL-MCNC: 15.3 UG/ML (ref 10–22)
VANCOMYCIN TROUGH SERPL-MCNC: 15.8 UG/ML (ref 10–22)
VANCOMYCIN TROUGH SERPL-MCNC: 17.6 UG/ML (ref 10–22)
VANCOMYCIN TROUGH SERPL-MCNC: 17.9 UG/ML (ref 10–22)
VANCOMYCIN TROUGH SERPL-MCNC: 20.7 UG/ML (ref 10–22)
VANCOMYCIN TROUGH SERPL-MCNC: 22 UG/ML (ref 10–22)
VANCOMYCIN TROUGH SERPL-MCNC: 23.1 UG/ML (ref 10–22)
VANCOMYCIN TROUGH SERPL-MCNC: 24 UG/ML (ref 10–22)
VANCOMYCIN TROUGH SERPL-MCNC: 25.7 UG/ML (ref 10–22)
VANCOMYCIN TROUGH SERPL-MCNC: 30.3 UG/ML (ref 10–22)
VANCOMYCIN TROUGH SERPL-MCNC: 30.6 UG/ML (ref 10–22)
VANCOMYCIN TROUGH SERPL-MCNC: 33.4 UG/ML (ref 10–22)
VANCOMYCIN TROUGH SERPL-MCNC: 37.3 UG/ML (ref 10–22)
VERBAL RESULT READBACK PERFORMED: YES
VOL: 471
VOL: 486
VOL: 645
VORICONAZOLE SERPL-MCNC: 2.3 MCG/ML (ref 1–5.5)
VT: 280
VT: 300
VT: 320
VT: 342
VT: 345
VT: 349
VT: 350
VT: 351
VT: 352
VT: 363
VT: 364
VT: 364
VT: 369
VT: 369
VT: 381
VT: 391
VT: 395
VT: 402
VT: 404
VT: 414
VT: 419
VT: 420
VT: 424
VT: 424
VT: 430
VT: 430
VT: 432
VT: 437
VT: 438
WBC # BLD AUTO: 10 K/UL (ref 3.9–12.7)
WBC # BLD AUTO: 10.07 K/UL (ref 3.9–12.7)
WBC # BLD AUTO: 10.2 K/UL (ref 3.9–12.7)
WBC # BLD AUTO: 10.27 K/UL (ref 3.9–12.7)
WBC # BLD AUTO: 10.43 K/UL (ref 3.9–12.7)
WBC # BLD AUTO: 10.66 K/UL (ref 3.9–12.7)
WBC # BLD AUTO: 10.84 K/UL (ref 3.9–12.7)
WBC # BLD AUTO: 10.85 K/UL (ref 3.9–12.7)
WBC # BLD AUTO: 11.18 K/UL (ref 3.9–12.7)
WBC # BLD AUTO: 11.22 K/UL (ref 3.9–12.7)
WBC # BLD AUTO: 12.51 K/UL (ref 3.9–12.7)
WBC # BLD AUTO: 12.71 K/UL (ref 3.9–12.7)
WBC # BLD AUTO: 13.16 K/UL (ref 3.9–12.7)
WBC # BLD AUTO: 13.34 K/UL (ref 3.9–12.7)
WBC # BLD AUTO: 13.6 K/UL (ref 3.9–12.7)
WBC # BLD AUTO: 14.19 K/UL (ref 3.9–12.7)
WBC # BLD AUTO: 15.3 K/UL (ref 3.9–12.7)
WBC # BLD AUTO: 16.74 K/UL (ref 3.9–12.7)
WBC # BLD AUTO: 16.81 K/UL (ref 3.9–12.7)
WBC # BLD AUTO: 17.1 K/UL (ref 3.9–12.7)
WBC # BLD AUTO: 17.55 K/UL (ref 3.9–12.7)
WBC # BLD AUTO: 18.97 K/UL (ref 3.9–12.7)
WBC # BLD AUTO: 21.6 K/UL (ref 3.9–12.7)
WBC # BLD AUTO: 24.14 K/UL (ref 3.9–12.7)
WBC # BLD AUTO: 24.96 K/UL (ref 3.9–12.7)
WBC # BLD AUTO: 24.96 K/UL (ref 3.9–12.7)
WBC # BLD AUTO: 25.19 K/UL (ref 3.9–12.7)
WBC # BLD AUTO: 26.91 K/UL (ref 3.9–12.7)
WBC # BLD AUTO: 27.41 K/UL (ref 3.9–12.7)
WBC # BLD AUTO: 28.91 K/UL (ref 3.9–12.7)
WBC # BLD AUTO: 29.65 K/UL (ref 3.9–12.7)
WBC # BLD AUTO: 3.45 K/UL (ref 3.9–12.7)
WBC # BLD AUTO: 3.71 K/UL (ref 3.9–12.7)
WBC # BLD AUTO: 3.78 K/UL (ref 3.9–12.7)
WBC # BLD AUTO: 3.82 K/UL (ref 3.9–12.7)
WBC # BLD AUTO: 30.12 K/UL (ref 3.9–12.7)
WBC # BLD AUTO: 4.08 K/UL (ref 3.9–12.7)
WBC # BLD AUTO: 4.34 K/UL (ref 3.9–12.7)
WBC # BLD AUTO: 4.4 K/UL (ref 3.9–12.7)
WBC # BLD AUTO: 4.67 K/UL (ref 3.9–12.7)
WBC # BLD AUTO: 4.92 K/UL (ref 3.9–12.7)
WBC # BLD AUTO: 5.03 K/UL (ref 3.9–12.7)
WBC # BLD AUTO: 5.04 K/UL (ref 3.9–12.7)
WBC # BLD AUTO: 5.19 K/UL (ref 3.9–12.7)
WBC # BLD AUTO: 5.27 K/UL (ref 3.9–12.7)
WBC # BLD AUTO: 5.29 K/UL (ref 3.9–12.7)
WBC # BLD AUTO: 5.38 K/UL (ref 3.9–12.7)
WBC # BLD AUTO: 5.44 K/UL (ref 3.9–12.7)
WBC # BLD AUTO: 5.46 K/UL (ref 3.9–12.7)
WBC # BLD AUTO: 5.56 K/UL (ref 3.9–12.7)
WBC # BLD AUTO: 5.69 K/UL (ref 3.9–12.7)
WBC # BLD AUTO: 5.71 K/UL (ref 3.9–12.7)
WBC # BLD AUTO: 5.72 K/UL (ref 3.9–12.7)
WBC # BLD AUTO: 5.79 K/UL (ref 3.9–12.7)
WBC # BLD AUTO: 5.95 K/UL (ref 3.9–12.7)
WBC # BLD AUTO: 5.97 K/UL (ref 3.9–12.7)
WBC # BLD AUTO: 6.03 K/UL (ref 3.9–12.7)
WBC # BLD AUTO: 6.12 K/UL (ref 3.9–12.7)
WBC # BLD AUTO: 6.14 K/UL (ref 3.9–12.7)
WBC # BLD AUTO: 6.27 K/UL
WBC # BLD AUTO: 6.51 K/UL (ref 3.9–12.7)
WBC # BLD AUTO: 6.53 K/UL (ref 3.9–12.7)
WBC # BLD AUTO: 6.6 K/UL (ref 3.9–12.7)
WBC # BLD AUTO: 6.63 K/UL (ref 3.9–12.7)
WBC # BLD AUTO: 6.93 K/UL (ref 3.9–12.7)
WBC # BLD AUTO: 6.93 K/UL (ref 3.9–12.7)
WBC # BLD AUTO: 7 K/UL (ref 3.9–12.7)
WBC # BLD AUTO: 7.18 K/UL (ref 3.9–12.7)
WBC # BLD AUTO: 7.19 K/UL (ref 3.9–12.7)
WBC # BLD AUTO: 7.35 K/UL
WBC # BLD AUTO: 7.36 K/UL (ref 3.9–12.7)
WBC # BLD AUTO: 7.36 K/UL (ref 3.9–12.7)
WBC # BLD AUTO: 7.45 K/UL (ref 3.9–12.7)
WBC # BLD AUTO: 7.48 K/UL (ref 3.9–12.7)
WBC # BLD AUTO: 7.54 K/UL (ref 3.9–12.7)
WBC # BLD AUTO: 7.71 K/UL (ref 3.9–12.7)
WBC # BLD AUTO: 7.78 K/UL (ref 3.9–12.7)
WBC # BLD AUTO: 7.82 K/UL (ref 3.9–12.7)
WBC # BLD AUTO: 7.93 K/UL (ref 3.9–12.7)
WBC # BLD AUTO: 8.13 K/UL (ref 3.9–12.7)
WBC # BLD AUTO: 8.2 K/UL (ref 3.9–12.7)
WBC # BLD AUTO: 8.22 K/UL (ref 3.9–12.7)
WBC # BLD AUTO: 8.27 K/UL (ref 3.9–12.7)
WBC # BLD AUTO: 8.68 K/UL (ref 3.9–12.7)
WBC # BLD AUTO: 8.73 K/UL (ref 3.9–12.7)
WBC # BLD AUTO: 8.88 K/UL (ref 3.9–12.7)
WBC # BLD AUTO: 9.1 K/UL (ref 3.9–12.7)
WBC # BLD AUTO: 9.24 K/UL (ref 3.9–12.7)
WBC # BLD AUTO: 9.6 K/UL (ref 3.9–12.7)
WBC # BLD AUTO: 9.75 K/UL (ref 3.9–12.7)
WBC # BLD AUTO: 9.77 K/UL (ref 3.9–12.7)
WBC # BLD AUTO: 9.86 K/UL (ref 3.9–12.7)
WBC # BLD AUTO: 9.89 K/UL (ref 3.9–12.7)
WBC # BLD AUTO: 9.95 K/UL (ref 3.9–12.7)
WBC # FLD: 1410 /CU MM
WBC # FLD: 2000 /CU MM
WBC # FLD: 5715 /CU MM
WBC OTHER NFR BLD MANUAL: 1 %
WBC OTHER NFR BLD MANUAL: 3 %
WBC OTHER NFR BLD MANUAL: 3 %
WBC TOXIC VACUOLES BLD QL SMEAR: PRESENT

## 2019-01-01 PROCEDURE — 82803 BLOOD GASES ANY COMBINATION: CPT

## 2019-01-01 PROCEDURE — 94761 N-INVAS EAR/PLS OXIMETRY MLT: CPT

## 2019-01-01 PROCEDURE — 99900035 HC TECH TIME PER 15 MIN (STAT)

## 2019-01-01 PROCEDURE — 99900026 HC AIRWAY MAINTENANCE (STAT)

## 2019-01-01 PROCEDURE — 31622 PR BRONCHOSCOPY,DIAGNOSTIC: ICD-10-PCS | Mod: ,,, | Performed by: INTERNAL MEDICINE

## 2019-01-01 PROCEDURE — 63600175 PHARM REV CODE 636 W HCPCS: Performed by: PHYSICIAN ASSISTANT

## 2019-01-01 PROCEDURE — 25000003 PHARM REV CODE 250

## 2019-01-01 PROCEDURE — 90945 PR DIALYSIS, NOT HEMO, 1 EVAL: ICD-10-PCS | Mod: ,,, | Performed by: NURSE PRACTITIONER

## 2019-01-01 PROCEDURE — 99222 PR INITIAL HOSPITAL CARE,LEVL II: ICD-10-PCS | Mod: ,,, | Performed by: NURSE PRACTITIONER

## 2019-01-01 PROCEDURE — 94799 UNLISTED PULMONARY SVC/PX: CPT

## 2019-01-01 PROCEDURE — 85384 FIBRINOGEN ACTIVITY: CPT | Mod: 91

## 2019-01-01 PROCEDURE — 85025 COMPLETE CBC W/AUTO DIFF WBC: CPT

## 2019-01-01 PROCEDURE — 99232 PR SUBSEQUENT HOSPITAL CARE,LEVL II: ICD-10-PCS | Mod: ,,, | Performed by: INTERNAL MEDICINE

## 2019-01-01 PROCEDURE — 63600175 PHARM REV CODE 636 W HCPCS: Mod: NTX

## 2019-01-01 PROCEDURE — 85027 COMPLETE CBC AUTOMATED: CPT

## 2019-01-01 PROCEDURE — 25000003 PHARM REV CODE 250: Mod: NTX | Performed by: PHYSICIAN ASSISTANT

## 2019-01-01 PROCEDURE — 99232 PR SUBSEQUENT HOSPITAL CARE,LEVL II: ICD-10-PCS | Mod: NTX,,, | Performed by: INTERNAL MEDICINE

## 2019-01-01 PROCEDURE — 99213 OFFICE O/P EST LOW 20 MIN: CPT | Mod: PBBFAC,25 | Performed by: INTERNAL MEDICINE

## 2019-01-01 PROCEDURE — 99231 SBSQ HOSP IP/OBS SF/LOW 25: CPT | Mod: ,,, | Performed by: NURSE PRACTITIONER

## 2019-01-01 PROCEDURE — 25000003 PHARM REV CODE 250: Performed by: INTERNAL MEDICINE

## 2019-01-01 PROCEDURE — 99232 SBSQ HOSP IP/OBS MODERATE 35: CPT | Mod: ,,, | Performed by: NURSE PRACTITIONER

## 2019-01-01 PROCEDURE — 87015 SPECIMEN INFECT AGNT CONCNTJ: CPT

## 2019-01-01 PROCEDURE — 87517 HEPATITIS B DNA QUANT: CPT | Mod: NTX

## 2019-01-01 PROCEDURE — 99233 SBSQ HOSP IP/OBS HIGH 50: CPT | Mod: ,,, | Performed by: INTERNAL MEDICINE

## 2019-01-01 PROCEDURE — 25000003 PHARM REV CODE 250: Performed by: STUDENT IN AN ORGANIZED HEALTH CARE EDUCATION/TRAINING PROGRAM

## 2019-01-01 PROCEDURE — 87205 SMEAR GRAM STAIN: CPT

## 2019-01-01 PROCEDURE — 81003 URINALYSIS AUTO W/O SCOPE: CPT | Mod: NTX

## 2019-01-01 PROCEDURE — 80053 COMPREHEN METABOLIC PANEL: CPT | Mod: NTX

## 2019-01-01 PROCEDURE — 63600175 PHARM REV CODE 636 W HCPCS: Performed by: INTERNAL MEDICINE

## 2019-01-01 PROCEDURE — 88305 TISSUE SPECIMEN TO PATHOLOGY: ICD-10-PCS | Mod: 26,,, | Performed by: PATHOLOGY

## 2019-01-01 PROCEDURE — 36415 COLL VENOUS BLD VENIPUNCTURE: CPT | Mod: NTX

## 2019-01-01 PROCEDURE — 99999 PR PBB SHADOW E&M-EST. PATIENT-LVL III: ICD-10-PCS | Mod: PBBFAC,,, | Performed by: INTERNAL MEDICINE

## 2019-01-01 PROCEDURE — 25000242 PHARM REV CODE 250 ALT 637 W/ HCPCS: Performed by: STUDENT IN AN ORGANIZED HEALTH CARE EDUCATION/TRAINING PROGRAM

## 2019-01-01 PROCEDURE — 20600001 HC STEP DOWN PRIVATE ROOM: Mod: NTX

## 2019-01-01 PROCEDURE — 25000003 PHARM REV CODE 250: Performed by: PHYSICIAN ASSISTANT

## 2019-01-01 PROCEDURE — 63600175 PHARM REV CODE 636 W HCPCS: Mod: NTX | Performed by: OTOLARYNGOLOGY

## 2019-01-01 PROCEDURE — 80197 ASSAY OF TACROLIMUS: CPT

## 2019-01-01 PROCEDURE — 99213 OFFICE O/P EST LOW 20 MIN: CPT | Mod: PBBFAC,25,27 | Performed by: THORACIC SURGERY (CARDIOTHORACIC VASCULAR SURGERY)

## 2019-01-01 PROCEDURE — 27000221 HC OXYGEN, UP TO 24 HOURS

## 2019-01-01 PROCEDURE — 99233 PR SUBSEQUENT HOSPITAL CARE,LEVL III: ICD-10-PCS | Mod: ,,, | Performed by: INTERNAL MEDICINE

## 2019-01-01 PROCEDURE — 63600175 PHARM REV CODE 636 W HCPCS: Mod: JG | Performed by: INTERNAL MEDICINE

## 2019-01-01 PROCEDURE — 63600175 PHARM REV CODE 636 W HCPCS: Performed by: THORACIC SURGERY (CARDIOTHORACIC VASCULAR SURGERY)

## 2019-01-01 PROCEDURE — 80197 ASSAY OF TACROLIMUS: CPT | Mod: NTX

## 2019-01-01 PROCEDURE — 94640 AIRWAY INHALATION TREATMENT: CPT

## 2019-01-01 PROCEDURE — 85610 PROTHROMBIN TIME: CPT

## 2019-01-01 PROCEDURE — 99900025 HC BRONCHOSCOPY-ASST (STAT)

## 2019-01-01 PROCEDURE — 63600175 PHARM REV CODE 636 W HCPCS: Mod: TXP | Performed by: NURSE ANESTHETIST, CERTIFIED REGISTERED

## 2019-01-01 PROCEDURE — 87102 FUNGUS ISOLATION CULTURE: CPT | Mod: TXP

## 2019-01-01 PROCEDURE — 90945 DIALYSIS ONE EVALUATION: CPT

## 2019-01-01 PROCEDURE — 83735 ASSAY OF MAGNESIUM: CPT | Mod: 91

## 2019-01-01 PROCEDURE — 63600175 PHARM REV CODE 636 W HCPCS: Mod: JG | Performed by: STUDENT IN AN ORGANIZED HEALTH CARE EDUCATION/TRAINING PROGRAM

## 2019-01-01 PROCEDURE — 63600175 PHARM REV CODE 636 W HCPCS: Mod: NTX | Performed by: INTERNAL MEDICINE

## 2019-01-01 PROCEDURE — 83735 ASSAY OF MAGNESIUM: CPT | Mod: NTX

## 2019-01-01 PROCEDURE — 86870 RBC ANTIBODY IDENTIFICATION: CPT

## 2019-01-01 PROCEDURE — 20000000 HC ICU ROOM

## 2019-01-01 PROCEDURE — 87070 CULTURE OTHR SPECIMN AEROBIC: CPT

## 2019-01-01 PROCEDURE — 37000009 HC ANESTHESIA EA ADD 15 MINS: Performed by: INTERNAL MEDICINE

## 2019-01-01 PROCEDURE — G0378 HOSPITAL OBSERVATION PER HR: HCPCS

## 2019-01-01 PROCEDURE — 93451 RIGHT HEART CATH: CPT | Mod: TXP | Performed by: INTERNAL MEDICINE

## 2019-01-01 PROCEDURE — 86644 CMV ANTIBODY: CPT

## 2019-01-01 PROCEDURE — C1729 CATH, DRAINAGE: HCPCS | Performed by: THORACIC SURGERY (CARDIOTHORACIC VASCULAR SURGERY)

## 2019-01-01 PROCEDURE — 63600175 PHARM REV CODE 636 W HCPCS: Performed by: STUDENT IN AN ORGANIZED HEALTH CARE EDUCATION/TRAINING PROGRAM

## 2019-01-01 PROCEDURE — 83735 ASSAY OF MAGNESIUM: CPT

## 2019-01-01 PROCEDURE — 71046 X-RAY EXAM CHEST 2 VIEWS: CPT | Mod: 26,,, | Performed by: RADIOLOGY

## 2019-01-01 PROCEDURE — 94761 N-INVAS EAR/PLS OXIMETRY MLT: CPT | Mod: NTX

## 2019-01-01 PROCEDURE — 94668 MNPJ CHEST WALL SBSQ: CPT

## 2019-01-01 PROCEDURE — 63600175 PHARM REV CODE 636 W HCPCS

## 2019-01-01 PROCEDURE — 80100008 HC CRRT DAILY MAINTENANCE

## 2019-01-01 PROCEDURE — 99232 PR SUBSEQUENT HOSPITAL CARE,LEVL II: ICD-10-PCS | Mod: ,,, | Performed by: NURSE PRACTITIONER

## 2019-01-01 PROCEDURE — 25000003 PHARM REV CODE 250: Performed by: NURSE PRACTITIONER

## 2019-01-01 PROCEDURE — 80053 COMPREHEN METABOLIC PANEL: CPT

## 2019-01-01 PROCEDURE — 99214 PR OFFICE/OUTPT VISIT, EST, LEVL IV, 30-39 MIN: ICD-10-PCS | Mod: ,,, | Performed by: INTERNAL MEDICINE

## 2019-01-01 PROCEDURE — 63600175 PHARM REV CODE 636 W HCPCS: Performed by: NURSE PRACTITIONER

## 2019-01-01 PROCEDURE — 99222 1ST HOSP IP/OBS MODERATE 55: CPT | Mod: ,,, | Performed by: NURSE PRACTITIONER

## 2019-01-01 PROCEDURE — S0028 INJECTION, FAMOTIDINE, 20 MG: HCPCS | Performed by: STUDENT IN AN ORGANIZED HEALTH CARE EDUCATION/TRAINING PROGRAM

## 2019-01-01 PROCEDURE — 99232 SBSQ HOSP IP/OBS MODERATE 35: CPT | Mod: NTX,,, | Performed by: INTERNAL MEDICINE

## 2019-01-01 PROCEDURE — 63600175 PHARM REV CODE 636 W HCPCS: Performed by: GENERAL PRACTICE

## 2019-01-01 PROCEDURE — 84145 PROCALCITONIN (PCT): CPT

## 2019-01-01 PROCEDURE — 25000003 PHARM REV CODE 250: Mod: NTX | Performed by: NURSE ANESTHETIST, CERTIFIED REGISTERED

## 2019-01-01 PROCEDURE — 90472 TDAP VACCINE GREATER THAN OR EQUAL TO 7YO IM: ICD-10-PCS | Mod: S$GLB,TXP,, | Performed by: INTERNAL MEDICINE

## 2019-01-01 PROCEDURE — 32856 PR TRANSPLANT,PREP DONOR LUNG, DOUBLE: ICD-10-PCS | Mod: 62,51,, | Performed by: THORACIC SURGERY (CARDIOTHORACIC VASCULAR SURGERY)

## 2019-01-01 PROCEDURE — 36430 TRANSFUSION BLD/BLD COMPNT: CPT

## 2019-01-01 PROCEDURE — 20600001 HC STEP DOWN PRIVATE ROOM

## 2019-01-01 PROCEDURE — 25000242 PHARM REV CODE 250 ALT 637 W/ HCPCS: Performed by: PHYSICIAN ASSISTANT

## 2019-01-01 PROCEDURE — 80076 HEPATIC FUNCTION PANEL: CPT

## 2019-01-01 PROCEDURE — 99223 1ST HOSP IP/OBS HIGH 75: CPT | Mod: ,,, | Performed by: NURSE PRACTITIONER

## 2019-01-01 PROCEDURE — 86901 BLOOD TYPING SEROLOGIC RH(D): CPT

## 2019-01-01 PROCEDURE — 93010 ELECTROCARDIOGRAM REPORT: CPT | Mod: ,,, | Performed by: INTERNAL MEDICINE

## 2019-01-01 PROCEDURE — 99232 PR SUBSEQUENT HOSPITAL CARE,LEVL II: ICD-10-PCS | Mod: 25,,, | Performed by: INTERNAL MEDICINE

## 2019-01-01 PROCEDURE — 71000039 HC RECOVERY, EACH ADD'L HOUR: Mod: NTX | Performed by: OTOLARYNGOLOGY

## 2019-01-01 PROCEDURE — 36415 COLL VENOUS BLD VENIPUNCTURE: CPT

## 2019-01-01 PROCEDURE — D9220A PRA ANESTHESIA: Mod: ANES,,, | Performed by: ANESTHESIOLOGY

## 2019-01-01 PROCEDURE — 61782 SCAN PROC CRANIAL EXTRA: CPT | Mod: ,,, | Performed by: OTOLARYNGOLOGY

## 2019-01-01 PROCEDURE — 99232 SBSQ HOSP IP/OBS MODERATE 35: CPT | Mod: ,,, | Performed by: INTERNAL MEDICINE

## 2019-01-01 PROCEDURE — S0166 INJ OLANZAPINE 2.5MG: HCPCS | Performed by: PHYSICIAN ASSISTANT

## 2019-01-01 PROCEDURE — 63600175 PHARM REV CODE 636 W HCPCS: Mod: JG | Performed by: GENERAL PRACTICE

## 2019-01-01 PROCEDURE — 86880 COOMBS TEST DIRECT: CPT

## 2019-01-01 PROCEDURE — 25000003 PHARM REV CODE 250: Performed by: GENERAL PRACTICE

## 2019-01-01 PROCEDURE — 96365 THER/PROPH/DIAG IV INF INIT: CPT

## 2019-01-01 PROCEDURE — 31622 DX BRONCHOSCOPE/WASH: CPT | Mod: ,,, | Performed by: INTERNAL MEDICINE

## 2019-01-01 PROCEDURE — 87116 MYCOBACTERIA CULTURE: CPT

## 2019-01-01 PROCEDURE — 99999 PR PBB SHADOW E&M-EST. PATIENT-LVL II: CPT | Mod: PBBFAC,,, | Performed by: PSYCHIATRY & NEUROLOGY

## 2019-01-01 PROCEDURE — 87102 FUNGUS ISOLATION CULTURE: CPT

## 2019-01-01 PROCEDURE — 71000015 HC POSTOP RECOV 1ST HR: Performed by: INTERNAL MEDICINE

## 2019-01-01 PROCEDURE — 94618 PULMONARY STRESS TESTING: ICD-10-PCS | Mod: S$GLB,TXP,, | Performed by: INTERNAL MEDICINE

## 2019-01-01 PROCEDURE — 81300001 HC LUNG ACQUISITION CHARGE

## 2019-01-01 PROCEDURE — 80197 ASSAY OF TACROLIMUS: CPT | Mod: 91

## 2019-01-01 PROCEDURE — 99238 HOSP IP/OBS DSCHRG MGMT 30/<: CPT | Mod: ,,, | Performed by: PHYSICIAN ASSISTANT

## 2019-01-01 PROCEDURE — 99223 PR INITIAL HOSPITAL CARE,LEVL III: ICD-10-PCS | Mod: 25,AI,, | Performed by: INTERNAL MEDICINE

## 2019-01-01 PROCEDURE — 87077 CULTURE AEROBIC IDENTIFY: CPT

## 2019-01-01 PROCEDURE — 25000242 PHARM REV CODE 250 ALT 637 W/ HCPCS: Performed by: NURSE PRACTITIONER

## 2019-01-01 PROCEDURE — 89051 BODY FLUID CELL COUNT: CPT

## 2019-01-01 PROCEDURE — 99223 1ST HOSP IP/OBS HIGH 75: CPT | Mod: ,,, | Performed by: SURGERY

## 2019-01-01 PROCEDURE — 99213 OFFICE O/P EST LOW 20 MIN: CPT | Mod: PBBFAC,TXP | Performed by: NURSE PRACTITIONER

## 2019-01-01 PROCEDURE — 82803 BLOOD GASES ANY COMBINATION: CPT | Mod: NTX

## 2019-01-01 PROCEDURE — 96365 THER/PROPH/DIAG IV INF INIT: CPT | Mod: NTX

## 2019-01-01 PROCEDURE — 99291 CRITICAL CARE FIRST HOUR: CPT | Mod: ,,, | Performed by: EMERGENCY MEDICINE

## 2019-01-01 PROCEDURE — 83605 ASSAY OF LACTIC ACID: CPT

## 2019-01-01 PROCEDURE — 86977 RBC SERUM PRETX INCUBJ/INHIB: CPT | Mod: 59,PO,TXP

## 2019-01-01 PROCEDURE — 82800 BLOOD PH: CPT

## 2019-01-01 PROCEDURE — 88305 TISSUE EXAM BY PATHOLOGIST: CPT | Mod: 26,,, | Performed by: PATHOLOGY

## 2019-01-01 PROCEDURE — 87206 SMEAR FLUORESCENT/ACID STAI: CPT

## 2019-01-01 PROCEDURE — 87305 ASPERGILLUS AG IA: CPT

## 2019-01-01 PROCEDURE — 99232 SBSQ HOSP IP/OBS MODERATE 35: CPT | Mod: ,,, | Performed by: PHYSICIAN ASSISTANT

## 2019-01-01 PROCEDURE — 99900017 HC EXTUBATION W/PARAMETERS (STAT)

## 2019-01-01 PROCEDURE — 86922 COMPATIBILITY TEST ANTIGLOB: CPT

## 2019-01-01 PROCEDURE — 87186 SC STD MICRODIL/AGAR DIL: CPT | Mod: 59,NTX

## 2019-01-01 PROCEDURE — 94770 HC EXHALED C02 TEST: CPT

## 2019-01-01 PROCEDURE — 99900035 HC TECH TIME PER 15 MIN (STAT): Mod: NTX

## 2019-01-01 PROCEDURE — 25000003 PHARM REV CODE 250: Mod: NTX | Performed by: INTERNAL MEDICINE

## 2019-01-01 PROCEDURE — 71046 XR CHEST PA AND LATERAL: ICD-10-PCS | Mod: 26,,, | Performed by: RADIOLOGY

## 2019-01-01 PROCEDURE — 82330 ASSAY OF CALCIUM: CPT

## 2019-01-01 PROCEDURE — P9016 RBC LEUKOCYTES REDUCED: HCPCS

## 2019-01-01 PROCEDURE — 99214 OFFICE O/P EST MOD 30 MIN: CPT | Mod: 25,S$PBB,, | Performed by: INTERNAL MEDICINE

## 2019-01-01 PROCEDURE — 87486 CHLMYD PNEUM DNA AMP PROBE: CPT

## 2019-01-01 PROCEDURE — D9220A PRA ANESTHESIA: Mod: NTX,,, | Performed by: ANESTHESIOLOGY

## 2019-01-01 PROCEDURE — G0121 COLON CA SCRN NOT HI RSK IND: HCPCS | Mod: TXP | Performed by: INTERNAL MEDICINE

## 2019-01-01 PROCEDURE — 31500 INSERT EMERGENCY AIRWAY: CPT | Mod: ,,, | Performed by: ANESTHESIOLOGY

## 2019-01-01 PROCEDURE — 82103 ALPHA-1-ANTITRYPSIN TOTAL: CPT | Mod: NTX

## 2019-01-01 PROCEDURE — 99499 NO LOS: ICD-10-PCS | Mod: ,,, | Performed by: SURGERY

## 2019-01-01 PROCEDURE — 85007 BL SMEAR W/DIFF WBC COUNT: CPT

## 2019-01-01 PROCEDURE — 99233 SBSQ HOSP IP/OBS HIGH 50: CPT | Mod: 24,,, | Performed by: INTERNAL MEDICINE

## 2019-01-01 PROCEDURE — 63600175 PHARM REV CODE 636 W HCPCS: Mod: JG | Performed by: NURSE PRACTITIONER

## 2019-01-01 PROCEDURE — 80048 BASIC METABOLIC PNL TOTAL CA: CPT | Mod: 91

## 2019-01-01 PROCEDURE — 80202 ASSAY OF VANCOMYCIN: CPT

## 2019-01-01 PROCEDURE — 87040 BLOOD CULTURE FOR BACTERIA: CPT

## 2019-01-01 PROCEDURE — 94003 VENT MGMT INPAT SUBQ DAY: CPT

## 2019-01-01 PROCEDURE — 97605 NEG PRS WND THER DME<=50SQCM: CPT

## 2019-01-01 PROCEDURE — 25000003 PHARM REV CODE 250: Mod: NTX | Performed by: OTOLARYNGOLOGY

## 2019-01-01 PROCEDURE — P9035 PLATELET PHERES LEUKOREDUCED: HCPCS

## 2019-01-01 PROCEDURE — 87106 FUNGI IDENTIFICATION YEAST: CPT

## 2019-01-01 PROCEDURE — 63600175 PHARM REV CODE 636 W HCPCS: Mod: NTX | Performed by: PHYSICIAN ASSISTANT

## 2019-01-01 PROCEDURE — 76700 US EXAM ABDOM COMPLETE: CPT | Mod: TC,TXP

## 2019-01-01 PROCEDURE — 92526 ORAL FUNCTION THERAPY: CPT

## 2019-01-01 PROCEDURE — 25000242 PHARM REV CODE 250 ALT 637 W/ HCPCS: Mod: NTX | Performed by: PHYSICIAN ASSISTANT

## 2019-01-01 PROCEDURE — 99999 PR PBB SHADOW E&M-EST. PATIENT-LVL II: ICD-10-PCS | Mod: PBBFAC,,, | Performed by: PSYCHIATRY & NEUROLOGY

## 2019-01-01 PROCEDURE — 99999 PR PBB SHADOW E&M-EST. PATIENT-LVL III: CPT | Mod: PBBFAC,,, | Performed by: OTOLARYNGOLOGY

## 2019-01-01 PROCEDURE — 84484 ASSAY OF TROPONIN QUANT: CPT | Mod: NTX

## 2019-01-01 PROCEDURE — 36620 INSERTION CATHETER ARTERY: CPT | Mod: 59,,, | Performed by: INTERNAL MEDICINE

## 2019-01-01 PROCEDURE — 94010 BREATHING CAPACITY TEST: CPT | Mod: 26,S$PBB,, | Performed by: INTERNAL MEDICINE

## 2019-01-01 PROCEDURE — 86901 BLOOD TYPING SEROLOGIC RH(D): CPT | Mod: 91

## 2019-01-01 PROCEDURE — 86825 HLA X-MATH NON-CYTOTOXIC: CPT | Mod: 91

## 2019-01-01 PROCEDURE — 36600 WITHDRAWAL OF ARTERIAL BLOOD: CPT

## 2019-01-01 PROCEDURE — 99214 PR OFFICE/OUTPT VISIT, EST, LEVL IV, 30-39 MIN: ICD-10-PCS | Mod: S$PBB,,, | Performed by: PSYCHIATRY & NEUROLOGY

## 2019-01-01 PROCEDURE — 36556 PR INSERT NON-TUNNEL CV CATH 5+ YRS OLD: ICD-10-PCS | Mod: ,,, | Performed by: INTERNAL MEDICINE

## 2019-01-01 PROCEDURE — 37799 UNLISTED PX VASCULAR SURGERY: CPT

## 2019-01-01 PROCEDURE — 90945 DIALYSIS ONE EVALUATION: CPT | Mod: ,,, | Performed by: INTERNAL MEDICINE

## 2019-01-01 PROCEDURE — 32854 LUNG TRANSPLANT WITH BYPASS: CPT | Mod: 62,22,, | Performed by: THORACIC SURGERY (CARDIOTHORACIC VASCULAR SURGERY)

## 2019-01-01 PROCEDURE — 80053 COMPREHEN METABOLIC PANEL: CPT | Mod: 91

## 2019-01-01 PROCEDURE — 99292 CRITICAL CARE ADDL 30 MIN: CPT | Mod: ,,, | Performed by: INTERNAL MEDICINE

## 2019-01-01 PROCEDURE — 27100098 HC SPACER: Mod: NTX

## 2019-01-01 PROCEDURE — 80069 RENAL FUNCTION PANEL: CPT | Mod: 91

## 2019-01-01 PROCEDURE — 27100092 HC HIGH FLOW DELIVERY CANNULA

## 2019-01-01 PROCEDURE — 27000221 HC OXYGEN, UP TO 24 HOURS: Mod: NTX

## 2019-01-01 PROCEDURE — 97535 SELF CARE MNGMENT TRAINING: CPT

## 2019-01-01 PROCEDURE — 63600175 PHARM REV CODE 636 W HCPCS: Mod: TXP | Performed by: INTERNAL MEDICINE

## 2019-01-01 PROCEDURE — 86833 HLA CLASS II HIGH DEFIN QUAL: CPT | Mod: PO,TXP

## 2019-01-01 PROCEDURE — 87186 SC STD MICRODIL/AGAR DIL: CPT | Mod: 59

## 2019-01-01 PROCEDURE — 87102 FUNGUS ISOLATION CULTURE: CPT | Mod: NTX

## 2019-01-01 PROCEDURE — 99233 PR SUBSEQUENT HOSPITAL CARE,LEVL III: ICD-10-PCS | Mod: 25,,, | Performed by: INTERNAL MEDICINE

## 2019-01-01 PROCEDURE — 80069 RENAL FUNCTION PANEL: CPT

## 2019-01-01 PROCEDURE — 97110 THERAPEUTIC EXERCISES: CPT

## 2019-01-01 PROCEDURE — 27201021 HC LEUKOCYTE FILTER: Mod: NTX

## 2019-01-01 PROCEDURE — 27200966 HC CLOSED SUCTION SYSTEM

## 2019-01-01 PROCEDURE — 99238 HOSP IP/OBS DSCHRG MGMT 30/<: CPT | Mod: NTX,,, | Performed by: INTERNAL MEDICINE

## 2019-01-01 PROCEDURE — D9220A PRA ANESTHESIA: ICD-10-PCS | Mod: CRNA,,, | Performed by: NURSE ANESTHETIST, CERTIFIED REGISTERED

## 2019-01-01 PROCEDURE — G0379 DIRECT REFER HOSPITAL OBSERV: HCPCS | Mod: NTX

## 2019-01-01 PROCEDURE — 97161 PT EVAL LOW COMPLEX 20 MIN: CPT

## 2019-01-01 PROCEDURE — 87186 SC STD MICRODIL/AGAR DIL: CPT | Mod: NTX

## 2019-01-01 PROCEDURE — 99213 OFFICE O/P EST LOW 20 MIN: CPT | Mod: S$PBB,,, | Performed by: THORACIC SURGERY (CARDIOTHORACIC VASCULAR SURGERY)

## 2019-01-01 PROCEDURE — 64463 ESP: ICD-10-PCS | Mod: 59,50,, | Performed by: ANESTHESIOLOGY

## 2019-01-01 PROCEDURE — 99222 PR INITIAL HOSPITAL CARE,LEVL II: ICD-10-PCS | Mod: NTX,,, | Performed by: INTERNAL MEDICINE

## 2019-01-01 PROCEDURE — 99255 IP/OBS CONSLTJ NEW/EST HI 80: CPT | Mod: ,,, | Performed by: INTERNAL MEDICINE

## 2019-01-01 PROCEDURE — 36620 ARTERIAL LINE: ICD-10-PCS | Mod: 59,,, | Performed by: INTERNAL MEDICINE

## 2019-01-01 PROCEDURE — 99499 NO LOS: ICD-10-PCS | Mod: S$PBB,,, | Performed by: THORACIC SURGERY (CARDIOTHORACIC VASCULAR SURGERY)

## 2019-01-01 PROCEDURE — 36556 CENTRAL LINE: ICD-10-PCS | Mod: 59,,, | Performed by: ANESTHESIOLOGY

## 2019-01-01 PROCEDURE — 99395 PREV VISIT EST AGE 18-39: CPT | Mod: S$PBB,,, | Performed by: OBSTETRICS & GYNECOLOGY

## 2019-01-01 PROCEDURE — 87206 SMEAR FLUORESCENT/ACID STAI: CPT | Mod: NTX

## 2019-01-01 PROCEDURE — 80076 HEPATIC FUNCTION PANEL: CPT | Mod: 91

## 2019-01-01 PROCEDURE — 80202 ASSAY OF VANCOMYCIN: CPT | Mod: NTX

## 2019-01-01 PROCEDURE — 99214 OFFICE O/P EST MOD 30 MIN: CPT | Mod: ,,, | Performed by: INTERNAL MEDICINE

## 2019-01-01 PROCEDURE — 99999 PR PBB SHADOW E&M-EST. PATIENT-LVL III: CPT | Mod: PBBFAC,TXP,, | Performed by: THORACIC SURGERY (CARDIOTHORACIC VASCULAR SURGERY)

## 2019-01-01 PROCEDURE — 99233 SBSQ HOSP IP/OBS HIGH 50: CPT | Mod: ,,, | Performed by: SURGERY

## 2019-01-01 PROCEDURE — 88305 TISSUE EXAM BY PATHOLOGIST: CPT | Performed by: PATHOLOGY

## 2019-01-01 PROCEDURE — 85025 COMPLETE CBC W/AUTO DIFF WBC: CPT | Mod: NTX

## 2019-01-01 PROCEDURE — 37000008 HC ANESTHESIA 1ST 15 MINUTES: Performed by: INTERNAL MEDICINE

## 2019-01-01 PROCEDURE — 99233 SBSQ HOSP IP/OBS HIGH 50: CPT | Mod: 25,,, | Performed by: INTERNAL MEDICINE

## 2019-01-01 PROCEDURE — 99223 PR INITIAL HOSPITAL CARE,LEVL III: ICD-10-PCS | Mod: NTX,,, | Performed by: OTOLARYNGOLOGY

## 2019-01-01 PROCEDURE — 94010 BREATHING CAPACITY TEST: CPT | Mod: 26,,, | Performed by: INTERNAL MEDICINE

## 2019-01-01 PROCEDURE — 94640 AIRWAY INHALATION TREATMENT: CPT | Mod: NTX

## 2019-01-01 PROCEDURE — P9012 CRYOPRECIPITATE EACH UNIT: HCPCS

## 2019-01-01 PROCEDURE — 31624 DX BRONCHOSCOPE/LAVAGE: CPT

## 2019-01-01 PROCEDURE — 97802 MEDICAL NUTRITION INDIV IN: CPT | Mod: S$GLB,TXP,, | Performed by: DIETITIAN, REGISTERED

## 2019-01-01 PROCEDURE — S0028 INJECTION, FAMOTIDINE, 20 MG: HCPCS | Performed by: PHYSICIAN ASSISTANT

## 2019-01-01 PROCEDURE — 97530 THERAPEUTIC ACTIVITIES: CPT

## 2019-01-01 PROCEDURE — 87077 CULTURE AEROBIC IDENTIFY: CPT | Mod: NTX

## 2019-01-01 PROCEDURE — 71000016 HC POSTOP RECOV ADDL HR: Performed by: INTERNAL MEDICINE

## 2019-01-01 PROCEDURE — 90792 PSYCH DIAG EVAL W/MED SRVCS: CPT | Mod: S$GLB,TXP,, | Performed by: PSYCHIATRY & NEUROLOGY

## 2019-01-01 PROCEDURE — 87186 SC STD MICRODIL/AGAR DIL: CPT

## 2019-01-01 PROCEDURE — 37000008 HC ANESTHESIA 1ST 15 MINUTES: Mod: TXP | Performed by: INTERNAL MEDICINE

## 2019-01-01 PROCEDURE — 99999 PR PBB SHADOW E&M-EST. PATIENT-LVL V: ICD-10-PCS | Mod: PBBFAC,,, | Performed by: INTERNAL MEDICINE

## 2019-01-01 PROCEDURE — 27100171 HC OXYGEN HIGH FLOW UP TO 24 HOURS

## 2019-01-01 PROCEDURE — 37000009 HC ANESTHESIA EA ADD 15 MINS: Mod: NTX | Performed by: OTOLARYNGOLOGY

## 2019-01-01 PROCEDURE — 99214 OFFICE O/P EST MOD 30 MIN: CPT | Mod: 25,S$GLB,TXP, | Performed by: INTERNAL MEDICINE

## 2019-01-01 PROCEDURE — 99292 PR CRITICAL CARE, ADDL 30 MIN: ICD-10-PCS | Mod: ,,, | Performed by: INTERNAL MEDICINE

## 2019-01-01 PROCEDURE — 71046 X-RAY EXAM CHEST 2 VIEWS: CPT | Mod: TC,TXP

## 2019-01-01 PROCEDURE — 71046 X-RAY EXAM CHEST 2 VIEWS: CPT | Mod: TC

## 2019-01-01 PROCEDURE — 86703 HIV-1/HIV-2 1 RESULT ANTBDY: CPT

## 2019-01-01 PROCEDURE — 99213 OFFICE O/P EST LOW 20 MIN: CPT | Mod: PBBFAC,25 | Performed by: OBSTETRICS & GYNECOLOGY

## 2019-01-01 PROCEDURE — 94729 PR C02/MEMBANE DIFFUSE CAPACITY: ICD-10-PCS | Mod: S$GLB,TXP,, | Performed by: INTERNAL MEDICINE

## 2019-01-01 PROCEDURE — D9220A PRA ANESTHESIA: ICD-10-PCS | Mod: NTX,,, | Performed by: ANESTHESIOLOGY

## 2019-01-01 PROCEDURE — 99233 SBSQ HOSP IP/OBS HIGH 50: CPT | Mod: ,,, | Performed by: NURSE PRACTITIONER

## 2019-01-01 PROCEDURE — 90945 PR DIALYSIS, NOT HEMO, 1 EVAL: ICD-10-PCS | Mod: ,,, | Performed by: INTERNAL MEDICINE

## 2019-01-01 PROCEDURE — 32854 PR LUNG TRANSPLANT,DBL W CP BYPASS: ICD-10-PCS | Mod: 62,22,, | Performed by: THORACIC SURGERY (CARDIOTHORACIC VASCULAR SURGERY)

## 2019-01-01 PROCEDURE — 27201004 HC FEMORAL BYPASS CANNULA: Mod: NTX

## 2019-01-01 PROCEDURE — 85014 HEMATOCRIT: CPT

## 2019-01-01 PROCEDURE — 31622 DX BRONCHOSCOPE/WASH: CPT

## 2019-01-01 PROCEDURE — 94618 PULMONARY STRESS TESTING: CPT | Mod: S$GLB,TXP,, | Performed by: INTERNAL MEDICINE

## 2019-01-01 PROCEDURE — 81373 HLA I TYPING 1 LOCUS LR: CPT | Mod: 91,PO,TXP

## 2019-01-01 PROCEDURE — 99232 SBSQ HOSP IP/OBS MODERATE 35: CPT | Mod: 25,,, | Performed by: SURGERY

## 2019-01-01 PROCEDURE — 99291 PR CRITICAL CARE, E/M 30-74 MINUTES: ICD-10-PCS | Mod: ,,, | Performed by: INTERNAL MEDICINE

## 2019-01-01 PROCEDURE — 71046 XR CHEST PA AND LATERAL: ICD-10-PCS | Mod: 26,NTX,, | Performed by: RADIOLOGY

## 2019-01-01 PROCEDURE — 99215 OFFICE O/P EST HI 40 MIN: CPT | Mod: PBBFAC,25,TXP | Performed by: INTERNAL MEDICINE

## 2019-01-01 PROCEDURE — 78701 KIDNEY IMAGING WITH FLOW: CPT | Mod: 26,TXP,, | Performed by: RADIOLOGY

## 2019-01-01 PROCEDURE — 86826 HLA X-MATCH NONCYTOTOXC ADDL: CPT | Mod: 91

## 2019-01-01 PROCEDURE — 45378 DIAGNOSTIC COLONOSCOPY: CPT | Mod: TXP,,, | Performed by: INTERNAL MEDICINE

## 2019-01-01 PROCEDURE — 99999 PR PBB SHADOW E&M-EST. PATIENT-LVL III: CPT | Mod: PBBFAC,,, | Performed by: OBSTETRICS & GYNECOLOGY

## 2019-01-01 PROCEDURE — 84100 ASSAY OF PHOSPHORUS: CPT | Mod: NTX

## 2019-01-01 PROCEDURE — P9021 RED BLOOD CELLS UNIT: HCPCS

## 2019-01-01 PROCEDURE — 99231 PR SUBSEQUENT HOSPITAL CARE,LEVL I: ICD-10-PCS | Mod: ,,, | Performed by: NURSE PRACTITIONER

## 2019-01-01 PROCEDURE — 25000003 PHARM REV CODE 250: Mod: NTX | Performed by: EMERGENCY MEDICINE

## 2019-01-01 PROCEDURE — 84295 ASSAY OF SERUM SODIUM: CPT

## 2019-01-01 PROCEDURE — 76937 US GUIDE VASCULAR ACCESS: CPT | Mod: 26,,, | Performed by: ANESTHESIOLOGY

## 2019-01-01 PROCEDURE — 86833 HLA CLASS II HIGH DEFIN QUAL: CPT

## 2019-01-01 PROCEDURE — 97116 GAIT TRAINING THERAPY: CPT

## 2019-01-01 PROCEDURE — A4216 STERILE WATER/SALINE, 10 ML: HCPCS | Performed by: INTERNAL MEDICINE

## 2019-01-01 PROCEDURE — 85025 COMPLETE CBC W/AUTO DIFF WBC: CPT | Mod: 91

## 2019-01-01 PROCEDURE — 87077 CULTURE AEROBIC IDENTIFY: CPT | Mod: 59

## 2019-01-01 PROCEDURE — 99233 PR SUBSEQUENT HOSPITAL CARE,LEVL III: ICD-10-PCS | Mod: ,,, | Performed by: SURGERY

## 2019-01-01 PROCEDURE — 99499 UNLISTED E&M SERVICE: CPT | Mod: ,,, | Performed by: SURGERY

## 2019-01-01 PROCEDURE — 88312 SPECIAL STAINS GROUP 1: CPT | Performed by: PATHOLOGY

## 2019-01-01 PROCEDURE — 87040 BLOOD CULTURE FOR BACTERIA: CPT | Mod: NTX

## 2019-01-01 PROCEDURE — 25000003 PHARM REV CODE 250: Performed by: ANESTHESIOLOGY

## 2019-01-01 PROCEDURE — 36000706: Performed by: INTERNAL MEDICINE

## 2019-01-01 PROCEDURE — 31500 INTUBATION: ICD-10-PCS | Mod: 59,,, | Performed by: INTERNAL MEDICINE

## 2019-01-01 PROCEDURE — 36415 COLL VENOUS BLD VENIPUNCTURE: CPT | Mod: TXP

## 2019-01-01 PROCEDURE — 99291 CRITICAL CARE FIRST HOUR: CPT | Mod: 25

## 2019-01-01 PROCEDURE — 31276 NSL/SINS NDSC FRNT TISS RMVL: CPT | Mod: 50,51,, | Performed by: OTOLARYNGOLOGY

## 2019-01-01 PROCEDURE — 27100088 HC CELL SAVER: Mod: NTX

## 2019-01-01 PROCEDURE — 87486 CHLMYD PNEUM DNA AMP PROBE: CPT | Mod: 59

## 2019-01-01 PROCEDURE — 99285 PR EMERGENCY DEPT VISIT,LEVEL V: ICD-10-PCS | Mod: ,,, | Performed by: EMERGENCY MEDICINE

## 2019-01-01 PROCEDURE — 96375 TX/PRO/DX INJ NEW DRUG ADDON: CPT | Mod: NTX

## 2019-01-01 PROCEDURE — 87075 CULTR BACTERIA EXCEPT BLOOD: CPT | Mod: NTX

## 2019-01-01 PROCEDURE — 82962 GLUCOSE BLOOD TEST: CPT | Performed by: INTERNAL MEDICINE

## 2019-01-01 PROCEDURE — 99233 PR SUBSEQUENT HOSPITAL CARE,LEVL III: ICD-10-PCS | Mod: ,,, | Performed by: NURSE PRACTITIONER

## 2019-01-01 PROCEDURE — 25000242 PHARM REV CODE 250 ALT 637 W/ HCPCS: Performed by: INTERNAL MEDICINE

## 2019-01-01 PROCEDURE — 88313 SPECIAL STAINS GROUP 2: CPT | Mod: 26,,, | Performed by: PATHOLOGY

## 2019-01-01 PROCEDURE — 94010 BREATHING CAPACITY TEST: CPT | Mod: PBBFAC | Performed by: INTERNAL MEDICINE

## 2019-01-01 PROCEDURE — 25000003 PHARM REV CODE 250: Mod: NTX | Performed by: ANESTHESIOLOGY

## 2019-01-01 PROCEDURE — 99222 PR INITIAL HOSPITAL CARE,LEVL II: ICD-10-PCS | Mod: NTX,,, | Performed by: OTOLARYNGOLOGY

## 2019-01-01 PROCEDURE — 63600175 PHARM REV CODE 636 W HCPCS: Performed by: EMERGENCY MEDICINE

## 2019-01-01 PROCEDURE — 96372 THER/PROPH/DIAG INJ SC/IM: CPT | Mod: NTX

## 2019-01-01 PROCEDURE — 36000711: Mod: NTX | Performed by: OTOLARYNGOLOGY

## 2019-01-01 PROCEDURE — 86829 HLA CLASS I/II ANTIBODY QUAL: CPT | Mod: PO,TXP

## 2019-01-01 PROCEDURE — 99238 HOSP IP/OBS DSCHRG MGMT 30/<: CPT | Mod: ,,, | Performed by: NURSE PRACTITIONER

## 2019-01-01 PROCEDURE — C9113 INJ PANTOPRAZOLE SODIUM, VIA: HCPCS | Performed by: INTERNAL MEDICINE

## 2019-01-01 PROCEDURE — 99285 EMERGENCY DEPT VISIT HI MDM: CPT | Mod: ,,, | Performed by: EMERGENCY MEDICINE

## 2019-01-01 PROCEDURE — 99999 PR PBB SHADOW E&M-EST. PATIENT-LVL III: CPT | Mod: PBBFAC,,, | Performed by: INTERNAL MEDICINE

## 2019-01-01 PROCEDURE — 99219 PR INITIAL OBSERVATION CARE,LEVL II: CPT | Mod: NTX,,, | Performed by: INTERNAL MEDICINE

## 2019-01-01 PROCEDURE — 71046 X-RAY EXAM CHEST 2 VIEWS: CPT | Mod: TC,NTX

## 2019-01-01 PROCEDURE — 74220 X-RAY XM ESOPHAGUS 1CNTRST: CPT | Mod: 26,TXP,, | Performed by: RADIOLOGY

## 2019-01-01 PROCEDURE — 99232 SBSQ HOSP IP/OBS MODERATE 35: CPT | Mod: 25,,, | Performed by: INTERNAL MEDICINE

## 2019-01-01 PROCEDURE — 87075 CULTR BACTERIA EXCEPT BLOOD: CPT

## 2019-01-01 PROCEDURE — 31628 BRONCHOSCOPY/LUNG BX EACH: CPT | Mod: RT,,, | Performed by: INTERNAL MEDICINE

## 2019-01-01 PROCEDURE — D9220A PRA ANESTHESIA: Mod: CRNA,,, | Performed by: NURSE ANESTHETIST, CERTIFIED REGISTERED

## 2019-01-01 PROCEDURE — 99999 PR PBB SHADOW E&M-EST. PATIENT-LVL I: ICD-10-PCS | Mod: PBBFAC,TXP,, | Performed by: DIETITIAN, REGISTERED

## 2019-01-01 PROCEDURE — 25000003 PHARM REV CODE 250: Mod: TXP | Performed by: INTERNAL MEDICINE

## 2019-01-01 PROCEDURE — 81300021

## 2019-01-01 PROCEDURE — 99205 OFFICE O/P NEW HI 60 MIN: CPT | Mod: S$GLB,TXP,, | Performed by: THORACIC SURGERY (CARDIOTHORACIC VASCULAR SURGERY)

## 2019-01-01 PROCEDURE — 87632 RESP VIRUS 6-11 TARGETS: CPT | Mod: NTX

## 2019-01-01 PROCEDURE — 94668 MNPJ CHEST WALL SBSQ: CPT | Mod: NTX

## 2019-01-01 PROCEDURE — 86901 BLOOD TYPING SEROLOGIC RH(D): CPT | Mod: NTX

## 2019-01-01 PROCEDURE — 85025 COMPLETE CBC W/AUTO DIFF WBC: CPT | Mod: TXP

## 2019-01-01 PROCEDURE — 99231 SBSQ HOSP IP/OBS SF/LOW 25: CPT | Mod: ,,, | Performed by: ANESTHESIOLOGY

## 2019-01-01 PROCEDURE — 90471 HEPATITIS A VACCINE ADULT IM: ICD-10-PCS | Mod: S$GLB,TXP,, | Performed by: INTERNAL MEDICINE

## 2019-01-01 PROCEDURE — 90632 HEPA VACCINE ADULT IM: CPT | Mod: S$GLB,TXP,, | Performed by: INTERNAL MEDICINE

## 2019-01-01 PROCEDURE — 86256 FLUORESCENT ANTIBODY TITER: CPT | Mod: 91,TXP

## 2019-01-01 PROCEDURE — 99213 OFFICE O/P EST LOW 20 MIN: CPT | Mod: S$GLB,,, | Performed by: NURSE PRACTITIONER

## 2019-01-01 PROCEDURE — 99214 OFFICE O/P EST MOD 30 MIN: CPT | Mod: 25,S$GLB,, | Performed by: INTERNAL MEDICINE

## 2019-01-01 PROCEDURE — 86825 HLA X-MATH NON-CYTOTOXIC: CPT

## 2019-01-01 PROCEDURE — 99233 SBSQ HOSP IP/OBS HIGH 50: CPT | Mod: 25,,, | Performed by: NURSE PRACTITIONER

## 2019-01-01 PROCEDURE — 87541 LEGION PNEUMO DNA AMP PROB: CPT

## 2019-01-01 PROCEDURE — 70486 CT MEDTRONIC SINUSES WITHOUT: ICD-10-PCS | Mod: 26,TXP,, | Performed by: RADIOLOGY

## 2019-01-01 PROCEDURE — 85060 BLOOD SMEAR INTERPRETATION: CPT | Mod: ,,, | Performed by: PATHOLOGY

## 2019-01-01 PROCEDURE — 86828 HLA CLASS I&II ANTIBODY QUAL: CPT | Mod: 91,PO,TXP

## 2019-01-01 PROCEDURE — 71000039 HC RECOVERY, EACH ADD'L HOUR: Performed by: INTERNAL MEDICINE

## 2019-01-01 PROCEDURE — 99999 PR PBB SHADOW E&M-EST. PATIENT-LVL V: CPT | Mod: PBBFAC,,,

## 2019-01-01 PROCEDURE — 99214 PR OFFICE/OUTPT VISIT, EST, LEVL IV, 30-39 MIN: ICD-10-PCS | Mod: 25,S$GLB,, | Performed by: NURSE PRACTITIONER

## 2019-01-01 PROCEDURE — 85610 PROTHROMBIN TIME: CPT | Mod: 91

## 2019-01-01 PROCEDURE — 37799 UNLISTED PX VASCULAR SURGERY: CPT | Mod: NTX

## 2019-01-01 PROCEDURE — 80053 COMPREHEN METABOLIC PANEL: CPT | Mod: TXP

## 2019-01-01 PROCEDURE — 31231 NASAL/SINUS ENDOSCOPY: ICD-10-PCS | Mod: S$PBB,,, | Performed by: OTOLARYNGOLOGY

## 2019-01-01 PROCEDURE — 85610 PROTHROMBIN TIME: CPT | Mod: NTX

## 2019-01-01 PROCEDURE — 31276 PR NASAL/SINUS ENDOSCOPY,EXPLOR FRONTAL SINUS: ICD-10-PCS | Mod: 50,51,, | Performed by: OTOLARYNGOLOGY

## 2019-01-01 PROCEDURE — 99499 UNLISTED E&M SERVICE: CPT | Mod: S$PBB,,, | Performed by: THORACIC SURGERY (CARDIOTHORACIC VASCULAR SURGERY)

## 2019-01-01 PROCEDURE — 86978 RBC PRETREATMENT SERUM: CPT

## 2019-01-01 PROCEDURE — 99999 PR PBB SHADOW E&M-EST. PATIENT-LVL V: CPT | Mod: PBBFAC,TXP,, | Performed by: INTERNAL MEDICINE

## 2019-01-01 PROCEDURE — P9017 PLASMA 1 DONOR FRZ W/IN 8 HR: HCPCS

## 2019-01-01 PROCEDURE — 37000009 HC ANESTHESIA EA ADD 15 MINS: Mod: TXP | Performed by: INTERNAL MEDICINE

## 2019-01-01 PROCEDURE — 99222 1ST HOSP IP/OBS MODERATE 55: CPT | Mod: NTX,,, | Performed by: INTERNAL MEDICINE

## 2019-01-01 PROCEDURE — 99223 1ST HOSP IP/OBS HIGH 75: CPT | Mod: NTX,,, | Performed by: OTOLARYNGOLOGY

## 2019-01-01 PROCEDURE — 99214 PR OFFICE/OUTPT VISIT, EST, LEVL IV, 30-39 MIN: ICD-10-PCS | Mod: 25,S$PBB,, | Performed by: INTERNAL MEDICINE

## 2019-01-01 PROCEDURE — 27000190 HC CPAP FULL FACE MASK W/VALVE

## 2019-01-01 PROCEDURE — 99215 OFFICE O/P EST HI 40 MIN: CPT | Mod: PBBFAC,25 | Performed by: INTERNAL MEDICINE

## 2019-01-01 PROCEDURE — 97802 PR MED NUTR THER, 1ST, INDIV, EA 15 MIN: ICD-10-PCS | Mod: S$GLB,TXP,, | Performed by: DIETITIAN, REGISTERED

## 2019-01-01 PROCEDURE — 85007 BL SMEAR W/DIFF WBC COUNT: CPT | Mod: NCS

## 2019-01-01 PROCEDURE — 80048 BASIC METABOLIC PNL TOTAL CA: CPT

## 2019-01-01 PROCEDURE — 92611 MOTION FLUOROSCOPY/SWALLOW: CPT

## 2019-01-01 PROCEDURE — G8980 MOBILITY D/C STATUS: HCPCS | Mod: CJ,NTX

## 2019-01-01 PROCEDURE — 32551 INSERTION OF CHEST TUBE: CPT

## 2019-01-01 PROCEDURE — 99395 PR PREVENTIVE VISIT,EST,18-39: ICD-10-PCS | Mod: S$PBB,,, | Performed by: OBSTETRICS & GYNECOLOGY

## 2019-01-01 PROCEDURE — 87522 HEPATITIS C REVRS TRNSCRPJ: CPT | Mod: NTX

## 2019-01-01 PROCEDURE — 86850 RBC ANTIBODY SCREEN: CPT

## 2019-01-01 PROCEDURE — 88312 TISSUE SPECIMEN TO PATHOLOGY, CARDIOLOGY/PULMONARY: ICD-10-PCS | Mod: 26,,, | Performed by: PATHOLOGY

## 2019-01-01 PROCEDURE — 25000003 PHARM REV CODE 250: Performed by: NURSE ANESTHETIST, CERTIFIED REGISTERED

## 2019-01-01 PROCEDURE — 99214 PR OFFICE/OUTPT VISIT, EST, LEVL IV, 30-39 MIN: ICD-10-PCS | Mod: AF,HB,S$PBB, | Performed by: PSYCHIATRY & NEUROLOGY

## 2019-01-01 PROCEDURE — 69210 REMOVE IMPACTED EAR WAX UNI: CPT | Mod: S$GLB,,, | Performed by: NURSE PRACTITIONER

## 2019-01-01 PROCEDURE — 31267 ENDOSCOPY MAXILLARY SINUS: CPT | Mod: 50,51,, | Performed by: OTOLARYNGOLOGY

## 2019-01-01 PROCEDURE — 25500020 PHARM REV CODE 255: Performed by: INTERNAL MEDICINE

## 2019-01-01 PROCEDURE — 90715 TDAP VACCINE GREATER THAN OR EQUAL TO 7YO IM: ICD-10-PCS | Mod: S$GLB,TXP,, | Performed by: INTERNAL MEDICINE

## 2019-01-01 PROCEDURE — 99291 CRITICAL CARE FIRST HOUR: CPT | Mod: ,,, | Performed by: INTERNAL MEDICINE

## 2019-01-01 PROCEDURE — 87086 URINE CULTURE/COLONY COUNT: CPT | Mod: NTX

## 2019-01-01 PROCEDURE — 76700 US ABDOMEN COMPLETE: ICD-10-PCS | Mod: 26,TXP,, | Performed by: RADIOLOGY

## 2019-01-01 PROCEDURE — 85730 THROMBOPLASTIN TIME PARTIAL: CPT | Mod: 91

## 2019-01-01 PROCEDURE — 31624 DX BRONCHOSCOPE/LAVAGE: CPT | Mod: 59,RT,, | Performed by: INTERNAL MEDICINE

## 2019-01-01 PROCEDURE — 99999 PR PBB SHADOW E&M-EST. PATIENT-LVL V: ICD-10-PCS | Mod: PBBFAC,TXP,, | Performed by: INTERNAL MEDICINE

## 2019-01-01 PROCEDURE — 84132 ASSAY OF SERUM POTASSIUM: CPT

## 2019-01-01 PROCEDURE — 93312 TEE: ICD-10-PCS | Mod: 26,59,, | Performed by: ANESTHESIOLOGY

## 2019-01-01 PROCEDURE — 31231 NASAL ENDOSCOPY DX: CPT | Mod: PBBFAC | Performed by: OTOLARYNGOLOGY

## 2019-01-01 PROCEDURE — 93005 ELECTROCARDIOGRAM TRACING: CPT

## 2019-01-01 PROCEDURE — 70486 CT MAXILLOFACIAL W/O DYE: CPT | Mod: TC,NTX

## 2019-01-01 PROCEDURE — 97116 GAIT TRAINING THERAPY: CPT | Mod: NTX

## 2019-01-01 PROCEDURE — 84100 ASSAY OF PHOSPHORUS: CPT

## 2019-01-01 PROCEDURE — 11042 DEBRIDEMENT: ICD-10-PCS | Mod: ,,, | Performed by: STUDENT IN AN ORGANIZED HEALTH CARE EDUCATION/TRAINING PROGRAM

## 2019-01-01 PROCEDURE — 87116 MYCOBACTERIA CULTURE: CPT | Mod: NTX

## 2019-01-01 PROCEDURE — 99214 OFFICE O/P EST MOD 30 MIN: CPT | Mod: AF,HB,S$PBB,NTX | Performed by: PSYCHIATRY & NEUROLOGY

## 2019-01-01 PROCEDURE — 85730 THROMBOPLASTIN TIME PARTIAL: CPT

## 2019-01-01 PROCEDURE — 99214 OFFICE O/P EST MOD 30 MIN: CPT | Mod: 25,S$GLB,, | Performed by: NURSE PRACTITIONER

## 2019-01-01 PROCEDURE — 99223 PR INITIAL HOSPITAL CARE,LEVL III: ICD-10-PCS | Mod: ,,, | Performed by: INTERNAL MEDICINE

## 2019-01-01 PROCEDURE — 63600175 PHARM REV CODE 636 W HCPCS: Mod: JG

## 2019-01-01 PROCEDURE — 86644 CMV ANTIBODY: CPT | Mod: TXP

## 2019-01-01 PROCEDURE — 94010 BREATHING CAPACITY TEST: ICD-10-PCS | Mod: 26,,, | Performed by: INTERNAL MEDICINE

## 2019-01-01 PROCEDURE — 71046 X-RAY EXAM CHEST 2 VIEWS: CPT | Mod: 26,NTX,, | Performed by: RADIOLOGY

## 2019-01-01 PROCEDURE — 11042 DBRDMT SUBQ TIS 1ST 20SQCM/<: CPT | Mod: ,,, | Performed by: STUDENT IN AN ORGANIZED HEALTH CARE EDUCATION/TRAINING PROGRAM

## 2019-01-01 PROCEDURE — 99232 SBSQ HOSP IP/OBS MODERATE 35: CPT | Mod: ,,, | Performed by: ANESTHESIOLOGY

## 2019-01-01 PROCEDURE — P9045 ALBUMIN (HUMAN), 5%, 250 ML: HCPCS | Mod: JG | Performed by: STUDENT IN AN ORGANIZED HEALTH CARE EDUCATION/TRAINING PROGRAM

## 2019-01-01 PROCEDURE — 85027 COMPLETE CBC AUTOMATED: CPT | Mod: 91

## 2019-01-01 PROCEDURE — 94660 CPAP INITIATION&MGMT: CPT

## 2019-01-01 PROCEDURE — 36620 INSERTION CATHETER ARTERY: CPT | Mod: 59,,, | Performed by: ANESTHESIOLOGY

## 2019-01-01 PROCEDURE — 97803 MED NUTRITION INDIV SUBSEQ: CPT

## 2019-01-01 PROCEDURE — 99999 PR PBB SHADOW E&M-EST. PATIENT-LVL II: ICD-10-PCS | Mod: PBBFAC,TXP,, | Performed by: PSYCHIATRY & NEUROLOGY

## 2019-01-01 PROCEDURE — 99238 PR HOSPITAL DISCHARGE DAY,<30 MIN: ICD-10-PCS | Mod: ,,, | Performed by: PHYSICIAN ASSISTANT

## 2019-01-01 PROCEDURE — 99219 PR INITIAL OBSERVATION CARE,LEVL II: ICD-10-PCS | Mod: NTX,,, | Performed by: INTERNAL MEDICINE

## 2019-01-01 PROCEDURE — 86825 HLA X-MATH NON-CYTOTOXIC: CPT | Mod: 91,PO,TXP

## 2019-01-01 PROCEDURE — 99213 PR OFFICE/OUTPT VISIT, EST, LEVL III, 20-29 MIN: ICD-10-PCS | Mod: S$PBB,,, | Performed by: THORACIC SURGERY (CARDIOTHORACIC VASCULAR SURGERY)

## 2019-01-01 PROCEDURE — 99999 PR PBB SHADOW E&M-EST. PATIENT-LVL III: CPT | Mod: PBBFAC,TXP,, | Performed by: NURSE PRACTITIONER

## 2019-01-01 PROCEDURE — 99213 OFFICE O/P EST LOW 20 MIN: CPT | Mod: PBBFAC,25,27,TXP | Performed by: THORACIC SURGERY (CARDIOTHORACIC VASCULAR SURGERY)

## 2019-01-01 PROCEDURE — G0379 DIRECT REFER HOSPITAL OBSERV: HCPCS

## 2019-01-01 PROCEDURE — 99999 PR PBB SHADOW E&M-EST. PATIENT-LVL III: CPT | Mod: PBBFAC,TXP,, | Performed by: INTERNAL MEDICINE

## 2019-01-01 PROCEDURE — 94667 MNPJ CHEST WALL 1ST: CPT

## 2019-01-01 PROCEDURE — 87205 SMEAR GRAM STAIN: CPT | Mod: NTX

## 2019-01-01 PROCEDURE — 63600175 PHARM REV CODE 636 W HCPCS: Performed by: RADIOLOGY

## 2019-01-01 PROCEDURE — 88312 SPECIAL STAINS GROUP 1: CPT | Mod: 26,,, | Performed by: PATHOLOGY

## 2019-01-01 PROCEDURE — 36592 COLLECT BLOOD FROM PICC: CPT | Mod: NTX

## 2019-01-01 PROCEDURE — 85007 BL SMEAR W/DIFF WBC COUNT: CPT | Mod: 91

## 2019-01-01 PROCEDURE — 36569 INSJ PICC 5 YR+ W/O IMAGING: CPT

## 2019-01-01 PROCEDURE — 27000175 HC ADULT BYPASS PUMP: Mod: NTX

## 2019-01-01 PROCEDURE — 88309 TISSUE SPECIMEN TO PATHOLOGY - SURGERY: ICD-10-PCS | Mod: 26,,, | Performed by: PATHOLOGY

## 2019-01-01 PROCEDURE — 63600175 PHARM REV CODE 636 W HCPCS: Performed by: ANESTHESIOLOGY

## 2019-01-01 PROCEDURE — 36590 PR REMOVAL TUNNELED CV CATH W SUBQ PORT OR PUMP: ICD-10-PCS | Mod: ,,, | Performed by: SURGERY

## 2019-01-01 PROCEDURE — 85730 THROMBOPLASTIN TIME PARTIAL: CPT | Mod: NTX

## 2019-01-01 PROCEDURE — 94002 VENT MGMT INPAT INIT DAY: CPT

## 2019-01-01 PROCEDURE — 63600175 PHARM REV CODE 636 W HCPCS: Mod: NTX | Performed by: ANESTHESIOLOGY

## 2019-01-01 PROCEDURE — 81376 HLA II TYPING 1 LOCUS LR: CPT | Mod: 91,PO,TXP

## 2019-01-01 PROCEDURE — 86235 NUCLEAR ANTIGEN ANTIBODY: CPT | Mod: NTX

## 2019-01-01 PROCEDURE — 71250 CT THORAX DX C-: CPT | Mod: TC

## 2019-01-01 PROCEDURE — C1751 CATH, INF, PER/CENT/MIDLINE: HCPCS | Mod: TXP | Performed by: INTERNAL MEDICINE

## 2019-01-01 PROCEDURE — 99232 PR SUBSEQUENT HOSPITAL CARE,LEVL II: ICD-10-PCS | Mod: ,,, | Performed by: PHYSICIAN ASSISTANT

## 2019-01-01 PROCEDURE — 86977 RBC SERUM PRETX INCUBJ/INHIB: CPT

## 2019-01-01 PROCEDURE — 36000930 HC OR TIME LEV VII 1ST 15 MIN: Performed by: THORACIC SURGERY (CARDIOTHORACIC VASCULAR SURGERY)

## 2019-01-01 PROCEDURE — 99214 OFFICE O/P EST MOD 30 MIN: CPT | Mod: S$GLB,TXP,, | Performed by: INTERNAL MEDICINE

## 2019-01-01 PROCEDURE — 99233 PR SUBSEQUENT HOSPITAL CARE,LEVL III: ICD-10-PCS | Mod: 24,,, | Performed by: INTERNAL MEDICINE

## 2019-01-01 PROCEDURE — 94664 DEMO&/EVAL PT USE INHALER: CPT

## 2019-01-01 PROCEDURE — 99223 1ST HOSP IP/OBS HIGH 75: CPT | Mod: 25,AI,, | Performed by: INTERNAL MEDICINE

## 2019-01-01 PROCEDURE — 45378 PR COLONOSCOPY,DIAGNOSTIC: ICD-10-PCS | Mod: TXP,,, | Performed by: INTERNAL MEDICINE

## 2019-01-01 PROCEDURE — 36590 REMOVAL TUNNELED CV CATH: CPT | Mod: ,,, | Performed by: SURGERY

## 2019-01-01 PROCEDURE — 86965 POOLING BLOOD PLATELETS: CPT

## 2019-01-01 PROCEDURE — 87502 INFLUENZA DNA AMP PROBE: CPT | Mod: NTX

## 2019-01-01 PROCEDURE — 99223 1ST HOSP IP/OBS HIGH 75: CPT | Mod: ,,, | Performed by: INTERNAL MEDICINE

## 2019-01-01 PROCEDURE — 36556 INSERT NON-TUNNEL CV CATH: CPT | Mod: 59,,, | Performed by: ANESTHESIOLOGY

## 2019-01-01 PROCEDURE — 99999 PR PBB SHADOW E&M-EST. PATIENT-LVL V: CPT | Mod: PBBFAC,,, | Performed by: INTERNAL MEDICINE

## 2019-01-01 PROCEDURE — 99999 PR PBB SHADOW E&M-EST. PATIENT-LVL II: CPT | Mod: PBBFAC,TXP,, | Performed by: PSYCHIATRY & NEUROLOGY

## 2019-01-01 PROCEDURE — 78701 KIDNEY IMAGING WITH FLOW: CPT | Mod: TC,TXP

## 2019-01-01 PROCEDURE — 99999 PR PBB SHADOW E&M-EST. PATIENT-LVL III: ICD-10-PCS | Mod: PBBFAC,TXP,, | Performed by: NURSE PRACTITIONER

## 2019-01-01 PROCEDURE — 97165 OT EVAL LOW COMPLEX 30 MIN: CPT

## 2019-01-01 PROCEDURE — 99233 SBSQ HOSP IP/OBS HIGH 50: CPT | Mod: 24,,, | Performed by: PHYSICIAN ASSISTANT

## 2019-01-01 PROCEDURE — 93451 PR RIGHT HEART CATH O2 SATURATION & CARDIAC OUTPUT: ICD-10-PCS | Mod: 26,TXP,, | Performed by: INTERNAL MEDICINE

## 2019-01-01 PROCEDURE — 31720 CLEARANCE OF AIRWAYS: CPT

## 2019-01-01 PROCEDURE — 86850 RBC ANTIBODY SCREEN: CPT | Mod: 91

## 2019-01-01 PROCEDURE — 99213 OFFICE O/P EST LOW 20 MIN: CPT | Mod: PBBFAC,25,27 | Performed by: OTOLARYNGOLOGY

## 2019-01-01 PROCEDURE — D9220A PRA ANESTHESIA: Mod: ,,, | Performed by: ANESTHESIOLOGY

## 2019-01-01 PROCEDURE — 97802 MEDICAL NUTRITION INDIV IN: CPT

## 2019-01-01 PROCEDURE — 86825 HLA X-MATH NON-CYTOTOXIC: CPT | Mod: PO,TXP

## 2019-01-01 PROCEDURE — 63600175 PHARM REV CODE 636 W HCPCS: Performed by: NURSE ANESTHETIST, CERTIFIED REGISTERED

## 2019-01-01 PROCEDURE — 37000008 HC ANESTHESIA 1ST 15 MINUTES: Mod: NTX | Performed by: OTOLARYNGOLOGY

## 2019-01-01 PROCEDURE — 94010 BREATHING CAPACITY TEST: CPT | Mod: S$GLB,TXP,, | Performed by: INTERNAL MEDICINE

## 2019-01-01 PROCEDURE — 76937 CENTRAL LINE: ICD-10-PCS | Mod: 26,,, | Performed by: ANESTHESIOLOGY

## 2019-01-01 PROCEDURE — 27000191 HC C-V MONITORING: Mod: NTX

## 2019-01-01 PROCEDURE — G8979 MOBILITY GOAL STATUS: HCPCS | Mod: CI,NTX

## 2019-01-01 PROCEDURE — 99231 PR SUBSEQUENT HOSPITAL CARE,LEVL I: ICD-10-PCS | Mod: ,,, | Performed by: ANESTHESIOLOGY

## 2019-01-01 PROCEDURE — 94010 BREATHING CAPACITY TEST: CPT | Mod: 26,NTX,, | Performed by: INTERNAL MEDICINE

## 2019-01-01 PROCEDURE — 94010 BREATHING CAPACITY TEST: ICD-10-PCS | Mod: 26,S$PBB,, | Performed by: INTERNAL MEDICINE

## 2019-01-01 PROCEDURE — 25000003 PHARM REV CODE 250: Performed by: THORACIC SURGERY (CARDIOTHORACIC VASCULAR SURGERY)

## 2019-01-01 PROCEDURE — 86920 COMPATIBILITY TEST SPIN: CPT

## 2019-01-01 PROCEDURE — 87633 RESP VIRUS 12-25 TARGETS: CPT

## 2019-01-01 PROCEDURE — 36000707: Performed by: INTERNAL MEDICINE

## 2019-01-01 PROCEDURE — 61782 PR STEREOTACTIC COMP ASSIST PROC,CRANIAL,EXTRADURAL: ICD-10-PCS | Mod: ,,, | Performed by: OTOLARYNGOLOGY

## 2019-01-01 PROCEDURE — 99999 PR PBB SHADOW E&M-EST. PATIENT-LVL V: ICD-10-PCS | Mod: PBBFAC,,,

## 2019-01-01 PROCEDURE — 99999 PR PBB SHADOW E&M-EST. PATIENT-LVL III: ICD-10-PCS | Mod: PBBFAC,,, | Performed by: THORACIC SURGERY (CARDIOTHORACIC VASCULAR SURGERY)

## 2019-01-01 PROCEDURE — 36000710: Mod: NTX | Performed by: OTOLARYNGOLOGY

## 2019-01-01 PROCEDURE — 99214 OFFICE O/P EST MOD 30 MIN: CPT | Mod: S$PBB,,, | Performed by: PSYCHIATRY & NEUROLOGY

## 2019-01-01 PROCEDURE — 99214 OFFICE O/P EST MOD 30 MIN: CPT | Mod: AF,HB,S$PBB, | Performed by: PSYCHIATRY & NEUROLOGY

## 2019-01-01 PROCEDURE — 71250 CT CHEST WITHOUT CONTRAST: ICD-10-PCS | Mod: 26,,, | Performed by: RADIOLOGY

## 2019-01-01 PROCEDURE — 88305 TISSUE SPECIMEN TO PATHOLOGY, CARDIOLOGY/PULMONARY: ICD-10-PCS | Mod: 26,,, | Performed by: PATHOLOGY

## 2019-01-01 PROCEDURE — 31624 PR BRONCHOSCOPY,DIAG2STIC W LAVAGE: ICD-10-PCS | Mod: 59,RT,, | Performed by: INTERNAL MEDICINE

## 2019-01-01 PROCEDURE — 99255 PR INITIAL INPATIENT CONSULT,LEVL V: ICD-10-PCS | Mod: ,,, | Performed by: INTERNAL MEDICINE

## 2019-01-01 PROCEDURE — 96375 TX/PRO/DX INJ NEW DRUG ADDON: CPT

## 2019-01-01 PROCEDURE — 90792 PR PSYCHIATRIC DIAGNOSTIC EVALUATION W/MEDICAL SERVICES: ICD-10-PCS | Mod: S$GLB,TXP,, | Performed by: PSYCHIATRY & NEUROLOGY

## 2019-01-01 PROCEDURE — 90945 DIALYSIS ONE EVALUATION: CPT | Mod: ,,, | Performed by: NURSE PRACTITIONER

## 2019-01-01 PROCEDURE — G0180 PR HOME HEALTH MD CERTIFICATION: ICD-10-PCS | Mod: ,,, | Performed by: THORACIC SURGERY (CARDIOTHORACIC VASCULAR SURGERY)

## 2019-01-01 PROCEDURE — G0378 HOSPITAL OBSERVATION PER HR: HCPCS | Mod: NTX

## 2019-01-01 PROCEDURE — 63600175 PHARM REV CODE 636 W HCPCS: Mod: JG,NTX | Performed by: INTERNAL MEDICINE

## 2019-01-01 PROCEDURE — 83605 ASSAY OF LACTIC ACID: CPT | Mod: 91,NTX

## 2019-01-01 PROCEDURE — 99285 EMERGENCY DEPT VISIT HI MDM: CPT | Mod: 25,NTX

## 2019-01-01 PROCEDURE — 71000033 HC RECOVERY, INTIAL HOUR: Performed by: INTERNAL MEDICINE

## 2019-01-01 PROCEDURE — 99214 PR OFFICE/OUTPT VISIT, EST, LEVL IV, 30-39 MIN: ICD-10-PCS | Mod: S$GLB,TXP,, | Performed by: INTERNAL MEDICINE

## 2019-01-01 PROCEDURE — 63600175 PHARM REV CODE 636 W HCPCS: Mod: NTX | Performed by: STUDENT IN AN ORGANIZED HEALTH CARE EDUCATION/TRAINING PROGRAM

## 2019-01-01 PROCEDURE — 31628 PR BRONCHOSCOPY,TRANSBRONCH BIOPSY: ICD-10-PCS | Mod: RT,,, | Performed by: INTERNAL MEDICINE

## 2019-01-01 PROCEDURE — 99215 OFFICE O/P EST HI 40 MIN: CPT | Mod: PBBFAC

## 2019-01-01 PROCEDURE — 99215 OFFICE O/P EST HI 40 MIN: CPT | Mod: 25,S$PBB,, | Performed by: OTOLARYNGOLOGY

## 2019-01-01 PROCEDURE — 31267 PR NASAL/SINUS ENDOSCOPY,RMV TISS MAXILL SINUS: ICD-10-PCS | Mod: 50,51,, | Performed by: OTOLARYNGOLOGY

## 2019-01-01 PROCEDURE — 87340 HEPATITIS B SURFACE AG IA: CPT

## 2019-01-01 PROCEDURE — 99222 1ST HOSP IP/OBS MODERATE 55: CPT | Mod: ,,, | Performed by: SURGERY

## 2019-01-01 PROCEDURE — 86665 EPSTEIN-BARR CAPSID VCA: CPT | Mod: TXP

## 2019-01-01 PROCEDURE — 87070 CULTURE OTHR SPECIMN AEROBIC: CPT | Mod: TXP

## 2019-01-01 PROCEDURE — 99212 OFFICE O/P EST SF 10 MIN: CPT | Mod: PBBFAC,NTX | Performed by: PSYCHIATRY & NEUROLOGY

## 2019-01-01 PROCEDURE — D9220A PRA ANESTHESIA: ICD-10-PCS | Mod: ANES,,, | Performed by: ANESTHESIOLOGY

## 2019-01-01 PROCEDURE — 99999 PR PBB SHADOW E&M-EST. PATIENT-LVL I: CPT | Mod: PBBFAC,TXP,, | Performed by: DIETITIAN, REGISTERED

## 2019-01-01 PROCEDURE — 86038 ANTINUCLEAR ANTIBODIES: CPT | Mod: TXP

## 2019-01-01 PROCEDURE — 99214 PR OFFICE/OUTPT VISIT, EST, LEVL IV, 30-39 MIN: ICD-10-PCS | Mod: AF,HB,S$PBB,NTX | Performed by: PSYCHIATRY & NEUROLOGY

## 2019-01-01 PROCEDURE — 71000033 HC RECOVERY, INTIAL HOUR: Mod: NTX | Performed by: OTOLARYNGOLOGY

## 2019-01-01 PROCEDURE — 94645 CONT INHLJ TX EACH ADDL HOUR: CPT

## 2019-01-01 PROCEDURE — 37000008 HC ANESTHESIA 1ST 15 MINUTES: Performed by: THORACIC SURGERY (CARDIOTHORACIC VASCULAR SURGERY)

## 2019-01-01 PROCEDURE — 63600175 PHARM REV CODE 636 W HCPCS: Mod: NTX | Performed by: NURSE ANESTHETIST, CERTIFIED REGISTERED

## 2019-01-01 PROCEDURE — 83605 ASSAY OF LACTIC ACID: CPT | Mod: 91

## 2019-01-01 PROCEDURE — 99232 PR SUBSEQUENT HOSPITAL CARE,LEVL II: ICD-10-PCS | Mod: 25,,, | Performed by: SURGERY

## 2019-01-01 PROCEDURE — 82330 ASSAY OF CALCIUM: CPT | Mod: 91

## 2019-01-01 PROCEDURE — 93312 ECHO TRANSESOPHAGEAL: CPT | Mod: 26,59,, | Performed by: ANESTHESIOLOGY

## 2019-01-01 PROCEDURE — 99238 PR HOSPITAL DISCHARGE DAY,<30 MIN: ICD-10-PCS | Mod: NTX,,, | Performed by: INTERNAL MEDICINE

## 2019-01-01 PROCEDURE — 93010 ELECTROCARDIOGRAM REPORT: CPT | Mod: NTX,,, | Performed by: INTERNAL MEDICINE

## 2019-01-01 PROCEDURE — D9220A PRA ANESTHESIA: ICD-10-PCS | Mod: ,,, | Performed by: ANESTHESIOLOGY

## 2019-01-01 PROCEDURE — 99214 PR OFFICE/OUTPT VISIT, EST, LEVL IV, 30-39 MIN: ICD-10-PCS | Mod: 25,S$GLB,TXP, | Performed by: INTERNAL MEDICINE

## 2019-01-01 PROCEDURE — 81376 HLA II TYPING 1 LOCUS LR: CPT | Mod: PO,TXP

## 2019-01-01 PROCEDURE — 93005 ELECTROCARDIOGRAM TRACING: CPT | Mod: NTX

## 2019-01-01 PROCEDURE — 84100 ASSAY OF PHOSPHORUS: CPT | Mod: 91

## 2019-01-01 PROCEDURE — 87070 CULTURE OTHR SPECIMN AEROBIC: CPT | Mod: NTX

## 2019-01-01 PROCEDURE — 99999 PR PBB SHADOW E&M-EST. PATIENT-LVL II: ICD-10-PCS | Mod: PBBFAC,,, | Performed by: NURSE PRACTITIONER

## 2019-01-01 PROCEDURE — 37000009 HC ANESTHESIA EA ADD 15 MINS: Performed by: THORACIC SURGERY (CARDIOTHORACIC VASCULAR SURGERY)

## 2019-01-01 PROCEDURE — 93010 EKG 12-LEAD: ICD-10-PCS | Mod: ,,, | Performed by: INTERNAL MEDICINE

## 2019-01-01 PROCEDURE — 36600 WITHDRAWAL OF ARTERIAL BLOOD: CPT | Mod: NTX

## 2019-01-01 PROCEDURE — 27201040 HC RC 50 FILTER

## 2019-01-01 PROCEDURE — 99215 PR OFFICE/OUTPT VISIT, EST, LEVL V, 40-54 MIN: ICD-10-PCS | Mod: 25,S$PBB,, | Performed by: OTOLARYNGOLOGY

## 2019-01-01 PROCEDURE — 86832 HLA CLASS I HIGH DEFIN QUAL: CPT

## 2019-01-01 PROCEDURE — 90632 HEPATITIS A VACCINE ADULT IM: ICD-10-PCS | Mod: S$GLB,TXP,, | Performed by: INTERNAL MEDICINE

## 2019-01-01 PROCEDURE — 99999 PR PBB SHADOW E&M-EST. PATIENT-LVL II: CPT | Mod: PBBFAC,,, | Performed by: NURSE PRACTITIONER

## 2019-01-01 PROCEDURE — 99233 PR SUBSEQUENT HOSPITAL CARE,LEVL III: ICD-10-PCS | Mod: 24,,, | Performed by: PHYSICIAN ASSISTANT

## 2019-01-01 PROCEDURE — 71000044 HC DOSC ROUTINE RECOVERY FIRST HOUR: Performed by: INTERNAL MEDICINE

## 2019-01-01 PROCEDURE — 99222 1ST HOSP IP/OBS MODERATE 55: CPT | Mod: NTX,,, | Performed by: OTOLARYNGOLOGY

## 2019-01-01 PROCEDURE — 27201037 HC PRESSURE MONITORING SET UP: Mod: NTX

## 2019-01-01 PROCEDURE — P9045 ALBUMIN (HUMAN), 5%, 250 ML: HCPCS | Mod: JG

## 2019-01-01 PROCEDURE — 99222 PR INITIAL HOSPITAL CARE,LEVL II: ICD-10-PCS | Mod: ,,, | Performed by: SURGERY

## 2019-01-01 PROCEDURE — 99233 PR SUBSEQUENT HOSPITAL CARE,LEVL III: ICD-10-PCS | Mod: 25,,, | Performed by: NURSE PRACTITIONER

## 2019-01-01 PROCEDURE — A9567 TECHNETIUM TC-99M AEROSOL: HCPCS | Mod: TXP

## 2019-01-01 PROCEDURE — 99214 PR OFFICE/OUTPT VISIT, EST, LEVL IV, 30-39 MIN: ICD-10-PCS | Mod: 25,S$GLB,, | Performed by: INTERNAL MEDICINE

## 2019-01-01 PROCEDURE — 88312 TISSUE SPECIMEN TO PATHOLOGY: ICD-10-PCS | Mod: 26,,, | Performed by: PATHOLOGY

## 2019-01-01 PROCEDURE — 99212 OFFICE O/P EST SF 10 MIN: CPT | Mod: PBBFAC | Performed by: NURSE PRACTITIONER

## 2019-01-01 PROCEDURE — 80299 QUANTITATIVE ASSAY DRUG: CPT

## 2019-01-01 PROCEDURE — 82784 ASSAY IGA/IGD/IGG/IGM EACH: CPT | Mod: NTX

## 2019-01-01 PROCEDURE — 96366 THER/PROPH/DIAG IV INF ADDON: CPT | Mod: NTX

## 2019-01-01 PROCEDURE — G0180 MD CERTIFICATION HHA PATIENT: HCPCS | Mod: ,,, | Performed by: THORACIC SURGERY (CARDIOTHORACIC VASCULAR SURGERY)

## 2019-01-01 PROCEDURE — 99999 PR PBB SHADOW E&M-EST. PATIENT-LVL III: ICD-10-PCS | Mod: PBBFAC,,, | Performed by: OBSTETRICS & GYNECOLOGY

## 2019-01-01 PROCEDURE — 99212 OFFICE O/P EST SF 10 MIN: CPT | Mod: PBBFAC | Performed by: PSYCHIATRY & NEUROLOGY

## 2019-01-01 PROCEDURE — 99999 PR PBB SHADOW E&M-EST. PATIENT-LVL III: ICD-10-PCS | Mod: PBBFAC,,, | Performed by: OTOLARYNGOLOGY

## 2019-01-01 PROCEDURE — 80200 ASSAY OF TOBRAMYCIN: CPT

## 2019-01-01 PROCEDURE — 99999 PR PBB SHADOW E&M-EST. PATIENT-LVL III: CPT | Mod: PBBFAC,,, | Performed by: THORACIC SURGERY (CARDIOTHORACIC VASCULAR SURGERY)

## 2019-01-01 PROCEDURE — 99213 PR OFFICE/OUTPT VISIT, EST, LEVL III, 20-29 MIN: ICD-10-PCS | Mod: S$GLB,,, | Performed by: NURSE PRACTITIONER

## 2019-01-01 PROCEDURE — 31259 NSL/SINS NDSC SPHN TISS RMVL: CPT | Mod: 50,NTX,, | Performed by: OTOLARYNGOLOGY

## 2019-01-01 PROCEDURE — 90471 IMMUNIZATION ADMIN: CPT | Mod: S$GLB,TXP,, | Performed by: INTERNAL MEDICINE

## 2019-01-01 PROCEDURE — 86832 HLA CLASS I HIGH DEFIN QUAL: CPT | Mod: PO,TXP

## 2019-01-01 PROCEDURE — 99223 PR INITIAL HOSPITAL CARE,LEVL III: ICD-10-PCS | Mod: ,,, | Performed by: SURGERY

## 2019-01-01 PROCEDURE — 97161 PT EVAL LOW COMPLEX 20 MIN: CPT | Mod: NTX

## 2019-01-01 PROCEDURE — 27201423 OPTIME MED/SURG SUP & DEVICES STERILE SUPPLY: Mod: NTX | Performed by: OTOLARYNGOLOGY

## 2019-01-01 PROCEDURE — 87077 CULTURE AEROBIC IDENTIFY: CPT | Mod: TXP

## 2019-01-01 PROCEDURE — 99999 PR PBB SHADOW E&M-EST. PATIENT-LVL III: ICD-10-PCS | Mod: PBBFAC,TXP,, | Performed by: INTERNAL MEDICINE

## 2019-01-01 PROCEDURE — 99291 PR CRITICAL CARE, E/M 30-74 MINUTES: ICD-10-PCS | Mod: ,,, | Performed by: EMERGENCY MEDICINE

## 2019-01-01 PROCEDURE — 85520 HEPARIN ASSAY: CPT | Mod: NTX

## 2019-01-01 PROCEDURE — 76700 US EXAM ABDOM COMPLETE: CPT | Mod: 26,TXP,, | Performed by: RADIOLOGY

## 2019-01-01 PROCEDURE — 64463 PVB THORACIC CONT INFUSION: CPT | Mod: 59,50,, | Performed by: ANESTHESIOLOGY

## 2019-01-01 PROCEDURE — 27201423 OPTIME MED/SURG SUP & DEVICES STERILE SUPPLY: Performed by: INTERNAL MEDICINE

## 2019-01-01 PROCEDURE — 25000242 PHARM REV CODE 250 ALT 637 W/ HCPCS: Performed by: EMERGENCY MEDICINE

## 2019-01-01 PROCEDURE — 36620 ARTERIAL: ICD-10-PCS | Mod: 59,,, | Performed by: ANESTHESIOLOGY

## 2019-01-01 PROCEDURE — 99212 OFFICE O/P EST SF 10 MIN: CPT | Mod: PBBFAC,TXP,25 | Performed by: PSYCHIATRY & NEUROLOGY

## 2019-01-01 PROCEDURE — 99999 PR PBB SHADOW E&M-EST. PATIENT-LVL III: ICD-10-PCS | Mod: PBBFAC,TXP,, | Performed by: THORACIC SURGERY (CARDIOTHORACIC VASCULAR SURGERY)

## 2019-01-01 PROCEDURE — 88313 TISSUE SPECIMEN TO PATHOLOGY: ICD-10-PCS | Mod: 26,,, | Performed by: PATHOLOGY

## 2019-01-01 PROCEDURE — 36000931 HC OR TIME LEV VII EA ADD 15 MIN: Performed by: THORACIC SURGERY (CARDIOTHORACIC VASCULAR SURGERY)

## 2019-01-01 PROCEDURE — 31500 INSERT EMERGENCY AIRWAY: CPT | Mod: 59,,, | Performed by: INTERNAL MEDICINE

## 2019-01-01 PROCEDURE — 84484 ASSAY OF TROPONIN QUANT: CPT

## 2019-01-01 PROCEDURE — 99223 PR INITIAL HOSPITAL CARE,LEVL III: ICD-10-PCS | Mod: ,,, | Performed by: NURSE PRACTITIONER

## 2019-01-01 PROCEDURE — 90715 TDAP VACCINE 7 YRS/> IM: CPT | Mod: S$GLB,TXP,, | Performed by: INTERNAL MEDICINE

## 2019-01-01 PROCEDURE — G8978 MOBILITY CURRENT STATUS: HCPCS | Mod: CJ,NTX

## 2019-01-01 PROCEDURE — C1894 INTRO/SHEATH, NON-LASER: HCPCS | Mod: NTX | Performed by: OTOLARYNGOLOGY

## 2019-01-01 PROCEDURE — 76937 US GUIDE VASCULAR ACCESS: CPT

## 2019-01-01 PROCEDURE — 94010 BREATHING CAPACITY TEST: CPT

## 2019-01-01 PROCEDURE — 76942 ECHO GUIDE FOR BIOPSY: CPT | Performed by: ANESTHESIOLOGY

## 2019-01-01 PROCEDURE — S0020 INJECTION, BUPIVICAINE HYDRO: HCPCS | Performed by: ANESTHESIOLOGY

## 2019-01-01 PROCEDURE — C1894 INTRO/SHEATH, NON-LASER: HCPCS | Mod: TXP | Performed by: INTERNAL MEDICINE

## 2019-01-01 PROCEDURE — 93451 RIGHT HEART CATH: CPT | Mod: 26,TXP,, | Performed by: INTERNAL MEDICINE

## 2019-01-01 PROCEDURE — A4216 STERILE WATER/SALINE, 10 ML: HCPCS | Performed by: STUDENT IN AN ORGANIZED HEALTH CARE EDUCATION/TRAINING PROGRAM

## 2019-01-01 PROCEDURE — 84443 ASSAY THYROID STIM HORMONE: CPT

## 2019-01-01 PROCEDURE — 84145 PROCALCITONIN (PCT): CPT | Mod: NTX

## 2019-01-01 PROCEDURE — 78701 NM KIDNEY IMAGING MORPH W VASCULAR: ICD-10-PCS | Mod: 26,TXP,, | Performed by: RADIOLOGY

## 2019-01-01 PROCEDURE — 94150 VITAL CAPACITY TEST: CPT

## 2019-01-01 PROCEDURE — 90472 IMMUNIZATION ADMIN EACH ADD: CPT | Mod: S$GLB,TXP,, | Performed by: INTERNAL MEDICINE

## 2019-01-01 PROCEDURE — 27202055 HC BRONCHOSCOPE, DISP

## 2019-01-01 PROCEDURE — 94729 DIFFUSING CAPACITY: CPT | Mod: S$GLB,TXP,, | Performed by: INTERNAL MEDICINE

## 2019-01-01 PROCEDURE — 83605 ASSAY OF LACTIC ACID: CPT | Mod: NTX

## 2019-01-01 PROCEDURE — 86920 COMPATIBILITY TEST SPIN: CPT | Mod: NTX

## 2019-01-01 PROCEDURE — 31500 INTUBATION: ICD-10-PCS | Mod: ,,, | Performed by: ANESTHESIOLOGY

## 2019-01-01 PROCEDURE — 69210 PR REMOVAL IMPACTED CERUMEN REQUIRING INSTRUMENTATION, UNILATERAL: ICD-10-PCS | Mod: S$GLB,,, | Performed by: NURSE PRACTITIONER

## 2019-01-01 PROCEDURE — 27201423 OPTIME MED/SURG SUP & DEVICES STERILE SUPPLY: Performed by: THORACIC SURGERY (CARDIOTHORACIC VASCULAR SURGERY)

## 2019-01-01 PROCEDURE — 88309 TISSUE EXAM BY PATHOLOGIST: CPT | Performed by: PATHOLOGY

## 2019-01-01 PROCEDURE — 82010 KETONE BODYS QUAN: CPT

## 2019-01-01 PROCEDURE — 99238 PR HOSPITAL DISCHARGE DAY,<30 MIN: ICD-10-PCS | Mod: ,,, | Performed by: NURSE PRACTITIONER

## 2019-01-01 PROCEDURE — 74220 X-RAY XM ESOPHAGUS 1CNTRST: CPT | Mod: TC,TXP

## 2019-01-01 PROCEDURE — 99205 PR OFFICE/OUTPT VISIT, NEW, LEVL V, 60-74 MIN: ICD-10-PCS | Mod: S$GLB,TXP,, | Performed by: THORACIC SURGERY (CARDIOTHORACIC VASCULAR SURGERY)

## 2019-01-01 PROCEDURE — 70486 CT MAXILLOFACIAL W/O DYE: CPT | Mod: 26,TXP,, | Performed by: RADIOLOGY

## 2019-01-01 PROCEDURE — C2625 STENT, NON-COR, TEM W/DEL SY: HCPCS | Mod: NTX | Performed by: OTOLARYNGOLOGY

## 2019-01-01 PROCEDURE — 85060 PATHOLOGIST REVIEW: ICD-10-PCS | Mod: ,,, | Performed by: PATHOLOGY

## 2019-01-01 PROCEDURE — 83880 ASSAY OF NATRIURETIC PEPTIDE: CPT | Mod: NTX

## 2019-01-01 PROCEDURE — A9698 NON-RAD CONTRAST MATERIALNOC: HCPCS | Performed by: INTERNAL MEDICINE

## 2019-01-01 PROCEDURE — C1751 CATH, INF, PER/CENT/MIDLINE: HCPCS

## 2019-01-01 PROCEDURE — 96361 HYDRATE IV INFUSION ADD-ON: CPT | Mod: NTX

## 2019-01-01 PROCEDURE — 31259 PR ENDOSCOPY, NASAL/SINUS, W/ETHMOIDECTOMY/SPHENOIDOTOMY/TISS REMVL: ICD-10-PCS | Mod: 50,NTX,, | Performed by: OTOLARYNGOLOGY

## 2019-01-01 PROCEDURE — 71250 CT THORAX DX C-: CPT | Mod: 26,,, | Performed by: RADIOLOGY

## 2019-01-01 PROCEDURE — 81373 HLA I TYPING 1 LOCUS LR: CPT | Mod: PO,TXP

## 2019-01-01 PROCEDURE — 99232 PR SUBSEQUENT HOSPITAL CARE,LEVL II: ICD-10-PCS | Mod: ,,, | Performed by: ANESTHESIOLOGY

## 2019-01-01 PROCEDURE — 36556 INSERT NON-TUNNEL CV CATH: CPT | Mod: ,,, | Performed by: INTERNAL MEDICINE

## 2019-01-01 PROCEDURE — 94010 BREATHING CAPACITY TEST: ICD-10-PCS | Mod: S$GLB,TXP,, | Performed by: INTERNAL MEDICINE

## 2019-01-01 PROCEDURE — 74220 FL ESOPHAGRAM COMPLETE: ICD-10-PCS | Mod: 26,TXP,, | Performed by: RADIOLOGY

## 2019-01-01 PROCEDURE — 80202 ASSAY OF VANCOMYCIN: CPT | Mod: TXP

## 2019-01-01 PROCEDURE — 83615 LACTATE (LD) (LDH) ENZYME: CPT

## 2019-01-01 PROCEDURE — 82390 ASSAY OF CERULOPLASMIN: CPT | Mod: NTX

## 2019-01-01 PROCEDURE — 93010 EKG 12-LEAD: ICD-10-PCS | Mod: NTX,,, | Performed by: INTERNAL MEDICINE

## 2019-01-01 PROCEDURE — 94010 BREATHING CAPACITY TEST: ICD-10-PCS | Mod: 26,NTX,, | Performed by: INTERNAL MEDICINE

## 2019-01-01 PROCEDURE — 88313 TISSUE SPECIMEN TO PATHOLOGY, CARDIOLOGY/PULMONARY: ICD-10-PCS | Mod: 26,,, | Performed by: PATHOLOGY

## 2019-01-01 DEVICE — IMPLANT PROPEL MOMETASONE: Type: IMPLANTABLE DEVICE | Site: NOSE | Status: FUNCTIONAL

## 2019-01-01 RX ORDER — ONDANSETRON 2 MG/ML
8 INJECTION INTRAMUSCULAR; INTRAVENOUS EVERY 6 HOURS PRN
Status: DISCONTINUED | OUTPATIENT
Start: 2019-01-01 | End: 2019-01-01

## 2019-01-01 RX ORDER — OXYCODONE HYDROCHLORIDE 10 MG/1
10 TABLET ORAL EVERY 4 HOURS PRN
Status: DISCONTINUED | OUTPATIENT
Start: 2019-01-01 | End: 2019-01-01 | Stop reason: HOSPADM

## 2019-01-01 RX ORDER — AMOXICILLIN 250 MG
1 CAPSULE ORAL 2 TIMES DAILY
Status: DISCONTINUED | OUTPATIENT
Start: 2019-01-01 | End: 2019-01-01 | Stop reason: HOSPADM

## 2019-01-01 RX ORDER — TRANEXAMIC ACID 100 MG/ML
INJECTION, SOLUTION INTRAVENOUS CONTINUOUS PRN
Status: DISCONTINUED | OUTPATIENT
Start: 2019-01-01 | End: 2019-01-01

## 2019-01-01 RX ORDER — LEVALBUTEROL 1.25 MG/.5ML
1.25 SOLUTION, CONCENTRATE RESPIRATORY (INHALATION) EVERY 4 HOURS PRN
Status: DISCONTINUED | OUTPATIENT
Start: 2019-01-01 | End: 2019-01-01 | Stop reason: HOSPADM

## 2019-01-01 RX ORDER — DIPHENHYDRAMINE HYDROCHLORIDE 50 MG/ML
50 INJECTION INTRAMUSCULAR; INTRAVENOUS EVERY 6 HOURS PRN
Status: DISCONTINUED | OUTPATIENT
Start: 2019-01-01 | End: 2019-01-01 | Stop reason: HOSPADM

## 2019-01-01 RX ORDER — DAPSONE 100 MG/1
100 TABLET ORAL DAILY
Status: DISCONTINUED | OUTPATIENT
Start: 2019-01-01 | End: 2019-01-01

## 2019-01-01 RX ORDER — BUTALBITAL, ACETAMINOPHEN AND CAFFEINE 50; 325; 40 MG/1; MG/1; MG/1
1 TABLET ORAL EVERY 6 HOURS PRN
Qty: 60 TABLET | Refills: 1 | Status: SHIPPED | OUTPATIENT
Start: 2019-01-01 | End: 2019-01-01 | Stop reason: SDUPTHER

## 2019-01-01 RX ORDER — ACETAMINOPHEN 10 MG/ML
1000 INJECTION, SOLUTION INTRAVENOUS ONCE
Status: COMPLETED | OUTPATIENT
Start: 2019-01-01 | End: 2019-01-01

## 2019-01-01 RX ORDER — FENTANYL CITRATE 50 UG/ML
50 INJECTION, SOLUTION INTRAMUSCULAR; INTRAVENOUS EVERY 5 MIN PRN
Status: DISCONTINUED | OUTPATIENT
Start: 2019-01-01 | End: 2019-01-01 | Stop reason: HOSPADM

## 2019-01-01 RX ORDER — INSULIN ASPART 100 [IU]/ML
8 INJECTION, SOLUTION INTRAVENOUS; SUBCUTANEOUS
Status: DISCONTINUED | OUTPATIENT
Start: 2019-01-01 | End: 2019-01-01 | Stop reason: HOSPADM

## 2019-01-01 RX ORDER — OXYCODONE HYDROCHLORIDE 5 MG/1
5 TABLET ORAL EVERY 4 HOURS PRN
Status: DISCONTINUED | OUTPATIENT
Start: 2019-01-01 | End: 2019-01-01

## 2019-01-01 RX ORDER — SULFAMETHOXAZOLE AND TRIMETHOPRIM 200; 40 MG/5ML; MG/5ML
20 SUSPENSION ORAL
Status: DISCONTINUED | OUTPATIENT
Start: 2019-01-01 | End: 2019-01-01

## 2019-01-01 RX ORDER — BISACODYL 10 MG
10 SUPPOSITORY, RECTAL RECTAL DAILY PRN
Status: DISCONTINUED | OUTPATIENT
Start: 2019-01-01 | End: 2019-01-01 | Stop reason: HOSPADM

## 2019-01-01 RX ORDER — HYDROCORTISONE 1 %
CREAM (GRAM) TOPICAL 2 TIMES DAILY
Status: DISCONTINUED | OUTPATIENT
Start: 2019-01-01 | End: 2019-01-01

## 2019-01-01 RX ORDER — HYDROCODONE BITARTRATE AND ACETAMINOPHEN 500; 5 MG/1; MG/1
TABLET ORAL CONTINUOUS
Status: DISCONTINUED | OUTPATIENT
Start: 2019-01-01 | End: 2019-01-01

## 2019-01-01 RX ORDER — MAGNESIUM SULFATE HEPTAHYDRATE 40 MG/ML
2 INJECTION, SOLUTION INTRAVENOUS
Status: DISCONTINUED | OUTPATIENT
Start: 2019-01-01 | End: 2019-01-01

## 2019-01-01 RX ORDER — MYCOPHENOLATE MOFETIL 250 MG/1
500 CAPSULE ORAL 2 TIMES DAILY
Status: DISCONTINUED | OUTPATIENT
Start: 2019-01-01 | End: 2019-01-01

## 2019-01-01 RX ORDER — MAGNESIUM SULFATE HEPTAHYDRATE 40 MG/ML
2 INJECTION, SOLUTION INTRAVENOUS
Status: COMPLETED | OUTPATIENT
Start: 2019-01-01 | End: 2019-01-01

## 2019-01-01 RX ORDER — HYDROMORPHONE HYDROCHLORIDE 1 MG/ML
0.2 INJECTION, SOLUTION INTRAMUSCULAR; INTRAVENOUS; SUBCUTANEOUS ONCE
Status: COMPLETED | OUTPATIENT
Start: 2019-01-01 | End: 2019-01-01

## 2019-01-01 RX ORDER — LANOLIN ALCOHOL/MO/W.PET/CERES
400 CREAM (GRAM) TOPICAL 2 TIMES DAILY
Qty: 60 TABLET | Refills: 11 | Status: SHIPPED | OUTPATIENT
Start: 2019-01-01 | End: 2019-01-01

## 2019-01-01 RX ORDER — LORAZEPAM 2 MG/1
TABLET ORAL
Refills: 0 | COMMUNITY
Start: 2019-01-01 | End: 2019-01-01 | Stop reason: SDUPTHER

## 2019-01-01 RX ORDER — HYDROMORPHONE HYDROCHLORIDE 1 MG/ML
0.2 INJECTION, SOLUTION INTRAMUSCULAR; INTRAVENOUS; SUBCUTANEOUS EVERY 5 MIN PRN
Status: DISCONTINUED | OUTPATIENT
Start: 2019-01-01 | End: 2019-01-01 | Stop reason: HOSPADM

## 2019-01-01 RX ORDER — POTASSIUM CHLORIDE 20 MEQ/15ML
40 SOLUTION ORAL
Status: DISCONTINUED | OUTPATIENT
Start: 2019-01-01 | End: 2019-01-01 | Stop reason: HOSPADM

## 2019-01-01 RX ORDER — METOPROLOL TARTRATE 25 MG/1
25 TABLET, FILM COATED ORAL 2 TIMES DAILY
Qty: 60 TABLET | Refills: 11 | Status: SHIPPED | OUTPATIENT
Start: 2019-01-01 | End: 2020-07-16

## 2019-01-01 RX ORDER — DAPSONE 100 MG/1
100 TABLET ORAL DAILY
Status: DISCONTINUED | OUTPATIENT
Start: 2019-01-01 | End: 2019-01-01 | Stop reason: HOSPADM

## 2019-01-01 RX ORDER — FENTANYL CITRATE 50 UG/ML
100 INJECTION, SOLUTION INTRAMUSCULAR; INTRAVENOUS ONCE
Status: COMPLETED | OUTPATIENT
Start: 2019-01-01 | End: 2019-01-01

## 2019-01-01 RX ORDER — VALGANCICLOVIR 450 MG/1
450 TABLET, FILM COATED ORAL 2 TIMES DAILY
Status: DISCONTINUED | OUTPATIENT
Start: 2019-01-01 | End: 2019-01-01

## 2019-01-01 RX ORDER — MAGNESIUM SULFATE HEPTAHYDRATE 40 MG/ML
2 INJECTION, SOLUTION INTRAVENOUS
Status: DISCONTINUED | OUTPATIENT
Start: 2019-01-01 | End: 2019-01-01 | Stop reason: HOSPADM

## 2019-01-01 RX ORDER — ACETAMINOPHEN 500 MG
1000 TABLET ORAL EVERY 6 HOURS PRN
Status: ON HOLD | COMMUNITY
End: 2019-01-01 | Stop reason: HOSPADM

## 2019-01-01 RX ORDER — SYRING-NEEDL,DISP,INSUL,0.3 ML 29 G X1/2"
296 SYRINGE, EMPTY DISPOSABLE MISCELLANEOUS
Status: DISPENSED | OUTPATIENT
Start: 2019-01-01 | End: 2019-01-01

## 2019-01-01 RX ORDER — LEVALBUTEROL 1.25 MG/.5ML
1.25 SOLUTION, CONCENTRATE RESPIRATORY (INHALATION) EVERY 4 HOURS PRN
Status: DISCONTINUED | OUTPATIENT
Start: 2019-01-01 | End: 2019-01-01

## 2019-01-01 RX ORDER — OMEPRAZOLE 40 MG/1
40 CAPSULE, DELAYED RELEASE ORAL 2 TIMES DAILY
Status: ON HOLD | COMMUNITY
End: 2019-01-01 | Stop reason: HOSPADM

## 2019-01-01 RX ORDER — VANCOMYCIN HCL IN 5 % DEXTROSE 1.25 G/25
20 PLASTIC BAG, INJECTION (ML) INTRAVENOUS ONCE
Status: DISCONTINUED | OUTPATIENT
Start: 2019-01-01 | End: 2019-01-01

## 2019-01-01 RX ORDER — IBUPROFEN 200 MG
24 TABLET ORAL
Status: DISCONTINUED | OUTPATIENT
Start: 2019-01-01 | End: 2019-01-01 | Stop reason: HOSPADM

## 2019-01-01 RX ORDER — INSULIN ASPART 100 [IU]/ML
2-5 INJECTION, SOLUTION INTRAVENOUS; SUBCUTANEOUS
Status: DISCONTINUED | OUTPATIENT
Start: 2019-01-01 | End: 2019-01-01

## 2019-01-01 RX ORDER — FLUTICASONE PROPIONATE 50 MCG
2 SPRAY, SUSPENSION (ML) NASAL DAILY
Status: DISCONTINUED | OUTPATIENT
Start: 2019-01-01 | End: 2019-01-01 | Stop reason: HOSPADM

## 2019-01-01 RX ORDER — OXYCODONE HYDROCHLORIDE 5 MG/1
5 TABLET ORAL EVERY 6 HOURS PRN
Qty: 15 TABLET | Refills: 0 | Status: SHIPPED | OUTPATIENT
Start: 2019-01-01 | End: 2019-01-01 | Stop reason: SDUPTHER

## 2019-01-01 RX ORDER — VENLAFAXINE HYDROCHLORIDE 37.5 MG/1
150 CAPSULE, EXTENDED RELEASE ORAL NIGHTLY
Status: DISCONTINUED | OUTPATIENT
Start: 2019-01-01 | End: 2019-01-01 | Stop reason: HOSPADM

## 2019-01-01 RX ORDER — DIPHENHYDRAMINE HYDROCHLORIDE 50 MG/ML
12.5 INJECTION INTRAMUSCULAR; INTRAVENOUS ONCE AS NEEDED
Status: DISCONTINUED | OUTPATIENT
Start: 2019-01-01 | End: 2019-01-01 | Stop reason: HOSPADM

## 2019-01-01 RX ORDER — LORAZEPAM 2 MG/ML
INJECTION INTRAMUSCULAR
Status: DISCONTINUED
Start: 2019-01-01 | End: 2019-01-01 | Stop reason: WASHOUT

## 2019-01-01 RX ORDER — FENTANYL CITRATE 50 UG/ML
INJECTION, SOLUTION INTRAMUSCULAR; INTRAVENOUS
Status: COMPLETED
Start: 2019-01-01 | End: 2019-01-01

## 2019-01-01 RX ORDER — ONDANSETRON 2 MG/ML
4 INJECTION INTRAMUSCULAR; INTRAVENOUS EVERY 8 HOURS PRN
Status: DISCONTINUED | OUTPATIENT
Start: 2019-01-01 | End: 2019-01-01 | Stop reason: HOSPADM

## 2019-01-01 RX ORDER — VENLAFAXINE HYDROCHLORIDE 37.5 MG/1
37.5 CAPSULE, EXTENDED RELEASE ORAL NIGHTLY
Status: DISCONTINUED | OUTPATIENT
Start: 2019-01-01 | End: 2019-01-01 | Stop reason: HOSPADM

## 2019-01-01 RX ORDER — FENTANYL CITRATE-0.9 % NACL/PF 10 MCG/ML
10 PLASTIC BAG, INJECTION (ML) INTRAVENOUS CONTINUOUS
Status: DISCONTINUED | OUTPATIENT
Start: 2019-01-01 | End: 2019-01-01

## 2019-01-01 RX ORDER — LORAZEPAM 1 MG/1
1 TABLET ORAL EVERY 8 HOURS PRN
Start: 2019-01-01 | End: 2019-01-01 | Stop reason: SDUPTHER

## 2019-01-01 RX ORDER — METOPROLOL TARTRATE 1 MG/ML
5 INJECTION, SOLUTION INTRAVENOUS ONCE
Status: COMPLETED | OUTPATIENT
Start: 2019-01-01 | End: 2019-01-01

## 2019-01-01 RX ORDER — TOPIRAMATE 25 MG/1
25 TABLET ORAL 2 TIMES DAILY
Status: DISCONTINUED | OUTPATIENT
Start: 2019-01-01 | End: 2019-01-01

## 2019-01-01 RX ORDER — QUETIAPINE FUMARATE 25 MG/1
25 TABLET, FILM COATED ORAL DAILY
Status: DISCONTINUED | OUTPATIENT
Start: 2019-01-01 | End: 2019-01-01 | Stop reason: HOSPADM

## 2019-01-01 RX ORDER — PROTAMINE SULFATE 10 MG/ML
INJECTION, SOLUTION INTRAVENOUS
Status: DISCONTINUED | OUTPATIENT
Start: 2019-01-01 | End: 2019-01-01

## 2019-01-01 RX ORDER — LIDOCAINE 50 MG/G
1 PATCH TOPICAL
Status: DISCONTINUED | OUTPATIENT
Start: 2019-01-01 | End: 2019-01-01

## 2019-01-01 RX ORDER — HYDROMORPHONE HYDROCHLORIDE 2 MG/ML
2 INJECTION, SOLUTION INTRAMUSCULAR; INTRAVENOUS; SUBCUTANEOUS
Status: DISCONTINUED | OUTPATIENT
Start: 2019-01-01 | End: 2019-01-01

## 2019-01-01 RX ORDER — ACETAMINOPHEN 325 MG/1
650 TABLET ORAL EVERY 6 HOURS
Status: DISCONTINUED | OUTPATIENT
Start: 2019-01-01 | End: 2019-01-01

## 2019-01-01 RX ORDER — CHLORHEXIDINE GLUCONATE ORAL RINSE 1.2 MG/ML
10 SOLUTION DENTAL 2 TIMES DAILY
Status: DISCONTINUED | OUTPATIENT
Start: 2019-01-01 | End: 2019-01-01

## 2019-01-01 RX ORDER — SUCCINYLCHOLINE CHLORIDE 20 MG/ML
INJECTION INTRAMUSCULAR; INTRAVENOUS
Status: COMPLETED
Start: 2019-01-01 | End: 2019-01-01

## 2019-01-01 RX ORDER — METOPROLOL TARTRATE 25 MG/1
25 TABLET, FILM COATED ORAL 2 TIMES DAILY
Status: DISCONTINUED | OUTPATIENT
Start: 2019-01-01 | End: 2019-01-01

## 2019-01-01 RX ORDER — DIPHENHYDRAMINE HYDROCHLORIDE 50 MG/ML
50 INJECTION INTRAMUSCULAR; INTRAVENOUS EVERY 12 HOURS PRN
Status: DISCONTINUED | OUTPATIENT
Start: 2019-01-01 | End: 2019-01-01 | Stop reason: HOSPADM

## 2019-01-01 RX ORDER — ENOXAPARIN SODIUM 100 MG/ML
40 INJECTION SUBCUTANEOUS EVERY 24 HOURS
Status: DISCONTINUED | OUTPATIENT
Start: 2019-01-01 | End: 2019-01-01

## 2019-01-01 RX ORDER — MEROPENEM AND SODIUM CHLORIDE 1 G/50ML
1 INJECTION, SOLUTION INTRAVENOUS
Status: DISCONTINUED | OUTPATIENT
Start: 2019-01-01 | End: 2019-01-01

## 2019-01-01 RX ORDER — ASPIRIN 325 MG
325 TABLET ORAL ONCE
Status: DISCONTINUED | OUTPATIENT
Start: 2019-01-01 | End: 2019-01-01

## 2019-01-01 RX ORDER — OXYCODONE HYDROCHLORIDE 10 MG/1
10 TABLET ORAL EVERY 4 HOURS PRN
Status: DISCONTINUED | OUTPATIENT
Start: 2019-01-01 | End: 2019-01-01

## 2019-01-01 RX ORDER — FOLIC ACID 1 MG/1
1000 TABLET ORAL DAILY
Qty: 30 TABLET | Refills: 11 | Status: SHIPPED | OUTPATIENT
Start: 2019-01-01

## 2019-01-01 RX ORDER — VENLAFAXINE HYDROCHLORIDE 150 MG/1
150 CAPSULE, EXTENDED RELEASE ORAL DAILY
Qty: 30 CAPSULE | Refills: 1 | Status: SHIPPED | OUTPATIENT
Start: 2019-01-01 | End: 2019-01-01 | Stop reason: SDUPTHER

## 2019-01-01 RX ORDER — VALGANCICLOVIR 450 MG/1
450 TABLET, FILM COATED ORAL 2 TIMES DAILY
Status: DISCONTINUED | OUTPATIENT
Start: 2019-01-01 | End: 2019-01-01 | Stop reason: HOSPADM

## 2019-01-01 RX ORDER — PROPOFOL 10 MG/ML
VIAL (ML) INTRAVENOUS CONTINUOUS PRN
Status: DISCONTINUED | OUTPATIENT
Start: 2019-01-01 | End: 2019-01-01

## 2019-01-01 RX ORDER — POLYETHYLENE GLYCOL 3350 17 G/17G
17 POWDER, FOR SOLUTION ORAL 2 TIMES DAILY
Status: DISCONTINUED | OUTPATIENT
Start: 2019-01-01 | End: 2019-01-01

## 2019-01-01 RX ORDER — NEEDLES, FILTER 19GX1 1/2"
NEEDLE, DISPOSABLE MISCELLANEOUS
Status: DISCONTINUED | OUTPATIENT
Start: 2019-01-01 | End: 2019-01-01

## 2019-01-01 RX ORDER — TACROLIMUS 0.5 MG/1
0.5 CAPSULE ORAL 2 TIMES DAILY
Status: DISCONTINUED | OUTPATIENT
Start: 2019-01-01 | End: 2019-01-01

## 2019-01-01 RX ORDER — INSULIN ASPART 100 [IU]/ML
4-6 INJECTION, SOLUTION INTRAVENOUS; SUBCUTANEOUS
Status: DISCONTINUED | OUTPATIENT
Start: 2019-01-01 | End: 2019-01-01

## 2019-01-01 RX ORDER — VALGANCICLOVIR 450 MG/1
450 TABLET, FILM COATED ORAL 2 TIMES DAILY
Qty: 60 TABLET | Refills: 11 | Status: SHIPPED | OUTPATIENT
Start: 2019-01-01 | End: 2020-07-14

## 2019-01-01 RX ORDER — FENTANYL CITRATE-0.9 % NACL/PF 10 MCG/ML
PLASTIC BAG, INJECTION (ML) INTRAVENOUS CONTINUOUS
Status: DISCONTINUED | OUTPATIENT
Start: 2019-01-01 | End: 2019-01-01

## 2019-01-01 RX ORDER — MAGNESIUM SULFATE HEPTAHYDRATE 40 MG/ML
2 INJECTION, SOLUTION INTRAVENOUS ONCE
Status: COMPLETED | OUTPATIENT
Start: 2019-01-01 | End: 2019-01-01

## 2019-01-01 RX ORDER — SODIUM CHLORIDE 0.9 % (FLUSH) 0.9 %
10 SYRINGE (ML) INJECTION
Status: DISCONTINUED | OUTPATIENT
Start: 2019-01-01 | End: 2019-01-01 | Stop reason: HOSPADM

## 2019-01-01 RX ORDER — INSULIN ASPART 100 [IU]/ML
1-10 INJECTION, SOLUTION INTRAVENOUS; SUBCUTANEOUS
Status: DISCONTINUED | OUTPATIENT
Start: 2019-01-01 | End: 2019-01-01

## 2019-01-01 RX ORDER — ONDANSETRON 8 MG/1
8 TABLET, ORALLY DISINTEGRATING ORAL EVERY 8 HOURS PRN
Status: DISCONTINUED | OUTPATIENT
Start: 2019-01-01 | End: 2019-01-01

## 2019-01-01 RX ORDER — LEVALBUTEROL TARTRATE 45 UG/1
1-2 AEROSOL, METERED ORAL
COMMUNITY
Start: 2018-01-01 | End: 2019-01-01 | Stop reason: SDUPTHER

## 2019-01-01 RX ORDER — MORPHINE SULFATE 15 MG/1
15 TABLET, FILM COATED, EXTENDED RELEASE ORAL 2 TIMES DAILY
Status: DISCONTINUED | OUTPATIENT
Start: 2019-01-01 | End: 2019-01-01 | Stop reason: HOSPADM

## 2019-01-01 RX ORDER — MORPHINE SULFATE 15 MG/1
15 TABLET, FILM COATED, EXTENDED RELEASE ORAL EVERY 12 HOURS
Status: DISCONTINUED | OUTPATIENT
Start: 2019-01-01 | End: 2019-01-01

## 2019-01-01 RX ORDER — PROPOFOL 10 MG/ML
INJECTION, EMULSION INTRAVENOUS
Status: COMPLETED
Start: 2019-01-01 | End: 2019-01-01

## 2019-01-01 RX ORDER — LEVALBUTEROL 1.25 MG/.5ML
1.25 SOLUTION, CONCENTRATE RESPIRATORY (INHALATION) EVERY 6 HOURS PRN
Status: DISCONTINUED | OUTPATIENT
Start: 2019-01-01 | End: 2019-01-01

## 2019-01-01 RX ORDER — IBUPROFEN 200 MG
16 TABLET ORAL
Status: DISCONTINUED | OUTPATIENT
Start: 2019-01-01 | End: 2019-01-01 | Stop reason: HOSPADM

## 2019-01-01 RX ORDER — INDOMETHACIN 25 MG/1
50 CAPSULE ORAL ONCE
Status: DISCONTINUED | OUTPATIENT
Start: 2019-01-01 | End: 2019-01-01

## 2019-01-01 RX ORDER — HYDROMORPHONE HCL IN 0.9% NACL 6 MG/30 ML
PATIENT CONTROLLED ANALGESIA SYRINGE INTRAVENOUS CONTINUOUS
Status: DISCONTINUED | OUTPATIENT
Start: 2019-01-01 | End: 2019-01-01

## 2019-01-01 RX ORDER — QUETIAPINE FUMARATE 25 MG/1
TABLET, FILM COATED ORAL
Qty: 90 TABLET | Refills: 1 | Status: SHIPPED | OUTPATIENT
Start: 2019-01-01 | End: 2019-01-01

## 2019-01-01 RX ORDER — INDOMETHACIN 25 MG/1
50 CAPSULE ORAL ONCE
Status: COMPLETED | OUTPATIENT
Start: 2019-01-01 | End: 2019-01-01

## 2019-01-01 RX ORDER — ASPIRIN 325 MG
325 TABLET ORAL DAILY
Status: DISCONTINUED | OUTPATIENT
Start: 2019-01-01 | End: 2019-01-01

## 2019-01-01 RX ORDER — DAPSONE 100 MG/1
100 TABLET ORAL DAILY
Qty: 30 TABLET | Refills: 11 | Status: SHIPPED | OUTPATIENT
Start: 2019-01-01 | End: 2019-01-01 | Stop reason: SDUPTHER

## 2019-01-01 RX ORDER — MIDAZOLAM HYDROCHLORIDE 1 MG/ML
INJECTION, SOLUTION INTRAMUSCULAR; INTRAVENOUS
Status: DISCONTINUED | OUTPATIENT
Start: 2019-01-01 | End: 2019-01-01

## 2019-01-01 RX ORDER — INSULIN ASPART 100 [IU]/ML
3-5 INJECTION, SOLUTION INTRAVENOUS; SUBCUTANEOUS
Status: DISCONTINUED | OUTPATIENT
Start: 2019-01-01 | End: 2019-01-01

## 2019-01-01 RX ORDER — FLUCONAZOLE 200 MG/1
400 TABLET ORAL DAILY
Status: DISCONTINUED | OUTPATIENT
Start: 2019-01-01 | End: 2019-01-01

## 2019-01-01 RX ORDER — HYDROMORPHONE HYDROCHLORIDE 1 MG/ML
1 INJECTION, SOLUTION INTRAMUSCULAR; INTRAVENOUS; SUBCUTANEOUS ONCE
Status: COMPLETED | OUTPATIENT
Start: 2019-01-01 | End: 2019-01-01

## 2019-01-01 RX ORDER — NOREPINEPHRINE BITARTRATE/D5W 4MG/250ML
0.05 PLASTIC BAG, INJECTION (ML) INTRAVENOUS CONTINUOUS
Status: DISCONTINUED | OUTPATIENT
Start: 2019-01-01 | End: 2019-01-01

## 2019-01-01 RX ORDER — INSULIN ASPART 100 [IU]/ML
6 INJECTION, SOLUTION INTRAVENOUS; SUBCUTANEOUS
Status: DISCONTINUED | OUTPATIENT
Start: 2019-01-01 | End: 2019-01-01

## 2019-01-01 RX ORDER — VENLAFAXINE HYDROCHLORIDE 37.5 MG/1
37.5 CAPSULE, EXTENDED RELEASE ORAL DAILY
Qty: 30 CAPSULE | Refills: 2 | Status: SHIPPED | OUTPATIENT
Start: 2019-01-01

## 2019-01-01 RX ORDER — LEVALBUTEROL INHALATION SOLUTION 0.63 MG/3ML
0.63 SOLUTION RESPIRATORY (INHALATION) 3 TIMES DAILY
Status: DISCONTINUED | OUTPATIENT
Start: 2019-01-01 | End: 2019-01-01

## 2019-01-01 RX ORDER — POLYETHYLENE GLYCOL 3350 17 G/17G
17 POWDER, FOR SOLUTION ORAL 2 TIMES DAILY
Status: DISCONTINUED | OUTPATIENT
Start: 2019-01-01 | End: 2019-01-01 | Stop reason: HOSPADM

## 2019-01-01 RX ORDER — POTASSIUM CHLORIDE 20 MEQ/15ML
40 SOLUTION ORAL
Status: CANCELLED | OUTPATIENT
Start: 2019-01-01

## 2019-01-01 RX ORDER — TIZANIDINE 4 MG/1
TABLET ORAL
Qty: 90 TABLET | Refills: 0 | Status: ON HOLD | OUTPATIENT
Start: 2019-01-01 | End: 2019-01-01 | Stop reason: HOSPADM

## 2019-01-01 RX ORDER — LEVALBUTEROL 1.25 MG/.5ML
1.25 SOLUTION, CONCENTRATE RESPIRATORY (INHALATION)
Status: DISCONTINUED | OUTPATIENT
Start: 2019-01-01 | End: 2019-01-01

## 2019-01-01 RX ORDER — DAPSONE 100 MG/1
100 TABLET ORAL DAILY
Qty: 30 TABLET | Refills: 11 | Status: SHIPPED | OUTPATIENT
Start: 2019-01-01

## 2019-01-01 RX ORDER — FAMOTIDINE 10 MG/ML
20 INJECTION INTRAVENOUS 2 TIMES DAILY
Status: DISCONTINUED | OUTPATIENT
Start: 2019-01-01 | End: 2019-01-01

## 2019-01-01 RX ORDER — GLUCAGON 1 MG
1 KIT INJECTION
Status: DISCONTINUED | OUTPATIENT
Start: 2019-01-01 | End: 2019-01-01

## 2019-01-01 RX ORDER — LOPERAMIDE HYDROCHLORIDE 2 MG/1
2 CAPSULE ORAL 4 TIMES DAILY PRN
Status: DISCONTINUED | OUTPATIENT
Start: 2019-01-01 | End: 2019-01-01 | Stop reason: HOSPADM

## 2019-01-01 RX ORDER — HYDROMORPHONE HYDROCHLORIDE 1 MG/ML
1 INJECTION, SOLUTION INTRAMUSCULAR; INTRAVENOUS; SUBCUTANEOUS EVERY 6 HOURS PRN
Status: DISCONTINUED | OUTPATIENT
Start: 2019-01-01 | End: 2019-01-01

## 2019-01-01 RX ORDER — ONDANSETRON 2 MG/ML
4 INJECTION INTRAMUSCULAR; INTRAVENOUS EVERY 12 HOURS PRN
Status: DISCONTINUED | OUTPATIENT
Start: 2019-01-01 | End: 2019-01-01

## 2019-01-01 RX ORDER — TACROLIMUS 1 MG/1
1 CAPSULE ORAL 2 TIMES DAILY
Status: DISCONTINUED | OUTPATIENT
Start: 2019-01-01 | End: 2019-01-01 | Stop reason: HOSPADM

## 2019-01-01 RX ORDER — LORAZEPAM 1 MG/1
2 TABLET ORAL EVERY 8 HOURS PRN
Status: DISCONTINUED | OUTPATIENT
Start: 2019-01-01 | End: 2019-01-01 | Stop reason: HOSPADM

## 2019-01-01 RX ORDER — MYCOPHENOLATE MOFETIL 250 MG/1
250 CAPSULE ORAL 2 TIMES DAILY
Status: DISCONTINUED | OUTPATIENT
Start: 2019-01-01 | End: 2019-01-01 | Stop reason: HOSPADM

## 2019-01-01 RX ORDER — METHYLPREDNISOLONE SOD SUCC 125 MG
125 VIAL (EA) INJECTION
Status: COMPLETED | OUTPATIENT
Start: 2019-01-01 | End: 2019-01-01

## 2019-01-01 RX ORDER — VORICONAZOLE 10 MG/ML
4 INJECTION, POWDER, LYOPHILIZED, FOR SOLUTION INTRAVENOUS EVERY 12 HOURS
Status: DISCONTINUED | OUTPATIENT
Start: 2019-01-01 | End: 2019-01-01

## 2019-01-01 RX ORDER — METHYLPREDNISOLONE SOD SUCC 125 MG
2 VIAL (EA) INJECTION DAILY
Status: COMPLETED | OUTPATIENT
Start: 2019-01-01 | End: 2019-01-01

## 2019-01-01 RX ORDER — TACROLIMUS 1 MG/1
1 CAPSULE ORAL EVERY MORNING
Status: DISCONTINUED | OUTPATIENT
Start: 2019-01-01 | End: 2019-01-01

## 2019-01-01 RX ORDER — POTASSIUM CHLORIDE 29.8 MG/ML
40 INJECTION INTRAVENOUS
Status: DISCONTINUED | OUTPATIENT
Start: 2019-01-01 | End: 2019-01-01 | Stop reason: HOSPADM

## 2019-01-01 RX ORDER — EPINEPHRINE 1 MG/ML
INJECTION, SOLUTION INTRACARDIAC; INTRAMUSCULAR; INTRAVENOUS; SUBCUTANEOUS
Status: DISCONTINUED | OUTPATIENT
Start: 2019-01-01 | End: 2019-01-01 | Stop reason: HOSPADM

## 2019-01-01 RX ORDER — VENLAFAXINE HYDROCHLORIDE 37.5 MG/1
150 CAPSULE, EXTENDED RELEASE ORAL DAILY
Status: DISCONTINUED | OUTPATIENT
Start: 2019-01-01 | End: 2019-01-01

## 2019-01-01 RX ORDER — ONDANSETRON 8 MG/1
8 TABLET, ORALLY DISINTEGRATING ORAL EVERY 8 HOURS PRN
Status: DISCONTINUED | OUTPATIENT
Start: 2019-01-01 | End: 2019-01-01 | Stop reason: HOSPADM

## 2019-01-01 RX ORDER — ETOMIDATE 2 MG/ML
INJECTION INTRAVENOUS
Status: COMPLETED
Start: 2019-01-01 | End: 2019-01-01

## 2019-01-01 RX ORDER — PROMETHAZINE HYDROCHLORIDE 25 MG/1
25 TABLET ORAL EVERY 6 HOURS PRN
Qty: 60 TABLET | Refills: 2 | Status: ON HOLD | OUTPATIENT
Start: 2019-01-01 | End: 2019-01-01 | Stop reason: HOSPADM

## 2019-01-01 RX ORDER — TIZANIDINE 4 MG/1
4 TABLET ORAL 2 TIMES DAILY
Status: DISCONTINUED | OUTPATIENT
Start: 2019-01-01 | End: 2019-01-01 | Stop reason: HOSPADM

## 2019-01-01 RX ORDER — PREDNISONE 10 MG/1
50 TABLET ORAL DAILY
Status: COMPLETED | OUTPATIENT
Start: 2019-01-01 | End: 2019-01-01

## 2019-01-01 RX ORDER — VORICONAZOLE 200 MG/1
200 TABLET, FILM COATED ORAL 2 TIMES DAILY
Status: DISCONTINUED | OUTPATIENT
Start: 2019-01-01 | End: 2019-01-01 | Stop reason: HOSPADM

## 2019-01-01 RX ORDER — INDOMETHACIN 25 MG/1
100 CAPSULE ORAL ONCE
Status: COMPLETED | OUTPATIENT
Start: 2019-01-01 | End: 2019-01-01

## 2019-01-01 RX ORDER — ACETAMINOPHEN 325 MG/1
650 TABLET ORAL EVERY 4 HOURS PRN
Status: DISCONTINUED | OUTPATIENT
Start: 2019-01-01 | End: 2019-01-01 | Stop reason: HOSPADM

## 2019-01-01 RX ORDER — INSULIN ASPART 100 [IU]/ML
INJECTION, SOLUTION INTRAVENOUS; SUBCUTANEOUS
Status: DISPENSED
Start: 2019-01-01 | End: 2019-01-01

## 2019-01-01 RX ORDER — LANOLIN ALCOHOL/MO/W.PET/CERES
400 CREAM (GRAM) TOPICAL 2 TIMES DAILY
Status: DISCONTINUED | OUTPATIENT
Start: 2019-01-01 | End: 2019-01-01

## 2019-01-01 RX ORDER — HYDROXYZINE HYDROCHLORIDE 10 MG/1
25 TABLET, FILM COATED ORAL 3 TIMES DAILY PRN
COMMUNITY
End: 2019-01-01

## 2019-01-01 RX ORDER — HYDROMORPHONE HYDROCHLORIDE 1 MG/ML
1 INJECTION, SOLUTION INTRAMUSCULAR; INTRAVENOUS; SUBCUTANEOUS EVERY 4 HOURS PRN
Status: DISCONTINUED | OUTPATIENT
Start: 2019-01-01 | End: 2019-01-01

## 2019-01-01 RX ORDER — CETIRIZINE HYDROCHLORIDE 10 MG/1
10 TABLET ORAL DAILY
Status: DISCONTINUED | OUTPATIENT
Start: 2019-01-01 | End: 2019-01-01 | Stop reason: HOSPADM

## 2019-01-01 RX ORDER — TIZANIDINE 4 MG/1
4 TABLET ORAL 2 TIMES DAILY
Status: DISCONTINUED | OUTPATIENT
Start: 2019-01-01 | End: 2019-01-01

## 2019-01-01 RX ORDER — KETAMINE HCL IN 0.9 % NACL 50 MG/5 ML
SYRINGE (ML) INTRAVENOUS
Status: DISCONTINUED | OUTPATIENT
Start: 2019-01-01 | End: 2019-01-01 | Stop reason: HOSPADM

## 2019-01-01 RX ORDER — TACROLIMUS 0.5 MG/1
0.5 CAPSULE ORAL EVERY 12 HOURS
Qty: 60 CAPSULE | Refills: 11 | Status: ON HOLD | OUTPATIENT
Start: 2019-01-01 | End: 2019-01-01 | Stop reason: HOSPADM

## 2019-01-01 RX ORDER — LORAZEPAM 1 MG/1
1 TABLET ORAL EVERY 8 HOURS PRN
Qty: 90 TABLET | Refills: 2 | Status: SHIPPED | OUTPATIENT
Start: 2019-01-01 | End: 2019-09-08

## 2019-01-01 RX ORDER — OXYCODONE HYDROCHLORIDE 10 MG/1
10 TABLET ORAL EVERY 6 HOURS PRN
Status: DISCONTINUED | OUTPATIENT
Start: 2019-01-01 | End: 2019-01-01

## 2019-01-01 RX ORDER — HYDROMORPHONE HYDROCHLORIDE 2 MG/ML
INJECTION, SOLUTION INTRAMUSCULAR; INTRAVENOUS; SUBCUTANEOUS
Status: DISPENSED
Start: 2019-01-01 | End: 2019-01-01

## 2019-01-01 RX ORDER — LANOLIN ALCOHOL/MO/W.PET/CERES
400 CREAM (GRAM) TOPICAL 3 TIMES DAILY
Status: DISCONTINUED | OUTPATIENT
Start: 2019-01-01 | End: 2019-01-01

## 2019-01-01 RX ORDER — VASOPRESSIN 20 [USP'U]/ML
INJECTION, SOLUTION INTRAMUSCULAR; SUBCUTANEOUS
Status: COMPLETED
Start: 2019-01-01 | End: 2019-01-01

## 2019-01-01 RX ORDER — METOPROLOL TARTRATE 25 MG/1
25 TABLET, FILM COATED ORAL 2 TIMES DAILY
Status: DISCONTINUED | OUTPATIENT
Start: 2019-01-01 | End: 2019-01-01 | Stop reason: HOSPADM

## 2019-01-01 RX ORDER — INDOMETHACIN 25 MG/1
CAPSULE ORAL
Status: COMPLETED
Start: 2019-01-01 | End: 2019-01-01

## 2019-01-01 RX ORDER — FENTANYL CITRATE 50 UG/ML
INJECTION, SOLUTION INTRAMUSCULAR; INTRAVENOUS CODE/TRAUMA/SEDATION MEDICATION
Status: COMPLETED | OUTPATIENT
Start: 2019-01-01 | End: 2019-01-01

## 2019-01-01 RX ORDER — LACTULOSE 10 G/15ML
20 SOLUTION ORAL 3 TIMES DAILY
Status: COMPLETED | OUTPATIENT
Start: 2019-01-01 | End: 2019-01-01

## 2019-01-01 RX ORDER — GABAPENTIN 300 MG/1
300 CAPSULE ORAL 3 TIMES DAILY
Status: DISCONTINUED | OUTPATIENT
Start: 2019-01-01 | End: 2019-01-01 | Stop reason: HOSPADM

## 2019-01-01 RX ORDER — METHYLPREDNISOLONE SOD SUCC 125 MG
2 VIAL (EA) INJECTION DAILY
Status: DISCONTINUED | OUTPATIENT
Start: 2019-01-01 | End: 2019-01-01

## 2019-01-01 RX ORDER — ONDANSETRON HYDROCHLORIDE 8 MG/1
8 TABLET, FILM COATED ORAL EVERY 12 HOURS PRN
Qty: 30 TABLET | Refills: 11 | Status: SHIPPED | OUTPATIENT
Start: 2019-01-01

## 2019-01-01 RX ORDER — INSULIN ASPART 100 [IU]/ML
0-10 INJECTION, SOLUTION INTRAVENOUS; SUBCUTANEOUS
Status: DISCONTINUED | OUTPATIENT
Start: 2019-01-01 | End: 2019-01-01

## 2019-01-01 RX ORDER — BUTALBITAL, ACETAMINOPHEN AND CAFFEINE 50; 325; 40 MG/1; MG/1; MG/1
1 TABLET ORAL EVERY 6 HOURS PRN
Qty: 60 TABLET | Refills: 1 | Status: CANCELLED | OUTPATIENT
Start: 2019-01-01

## 2019-01-01 RX ORDER — BUPIVACAINE HYDROCHLORIDE AND EPINEPHRINE 5; 5 MG/ML; UG/ML
INJECTION, SOLUTION EPIDURAL; INTRACAUDAL; PERINEURAL
Status: DISCONTINUED | OUTPATIENT
Start: 2019-01-01 | End: 2019-01-01

## 2019-01-01 RX ORDER — DEXMEDETOMIDINE HYDROCHLORIDE 4 UG/ML
0.2 INJECTION, SOLUTION INTRAVENOUS CONTINUOUS
Status: DISCONTINUED | OUTPATIENT
Start: 2019-01-01 | End: 2019-01-01

## 2019-01-01 RX ORDER — PANTOPRAZOLE SODIUM 40 MG/1
40 TABLET, DELAYED RELEASE ORAL DAILY
Status: DISCONTINUED | OUTPATIENT
Start: 2019-01-01 | End: 2019-01-01 | Stop reason: HOSPADM

## 2019-01-01 RX ORDER — QUETIAPINE FUMARATE 25 MG/1
25 TABLET, FILM COATED ORAL DAILY
Status: DISCONTINUED | OUTPATIENT
Start: 2019-01-01 | End: 2019-01-01

## 2019-01-01 RX ORDER — DIPHENHYDRAMINE HYDROCHLORIDE 50 MG/ML
50 INJECTION INTRAMUSCULAR; INTRAVENOUS EVERY 6 HOURS PRN
Status: DISCONTINUED | OUTPATIENT
Start: 2019-01-01 | End: 2019-01-01

## 2019-01-01 RX ORDER — TACROLIMUS 0.5 MG/1
0.5 CAPSULE ORAL EVERY EVENING
Status: DISCONTINUED | OUTPATIENT
Start: 2019-01-01 | End: 2019-01-01

## 2019-01-01 RX ORDER — IPRATROPIUM BROMIDE AND ALBUTEROL SULFATE 2.5; .5 MG/3ML; MG/3ML
3 SOLUTION RESPIRATORY (INHALATION) EVERY 4 HOURS PRN
Status: DISCONTINUED | OUTPATIENT
Start: 2019-01-01 | End: 2019-01-01

## 2019-01-01 RX ORDER — DOCUSATE SODIUM 50 MG/5ML
100 LIQUID ORAL 3 TIMES DAILY
Status: DISCONTINUED | OUTPATIENT
Start: 2019-01-01 | End: 2019-01-01

## 2019-01-01 RX ORDER — INSULIN ASPART 100 [IU]/ML
1-10 INJECTION, SOLUTION INTRAVENOUS; SUBCUTANEOUS EVERY 6 HOURS PRN
Status: DISCONTINUED | OUTPATIENT
Start: 2019-01-01 | End: 2019-01-01

## 2019-01-01 RX ORDER — LANOLIN ALCOHOL/MO/W.PET/CERES
400 CREAM (GRAM) TOPICAL 3 TIMES DAILY
Qty: 90 TABLET | Refills: 11 | Status: SHIPPED | OUTPATIENT
Start: 2019-01-01

## 2019-01-01 RX ORDER — FAMOTIDINE 20 MG/1
20 TABLET, FILM COATED ORAL 2 TIMES DAILY
Status: DISCONTINUED | OUTPATIENT
Start: 2019-01-01 | End: 2019-01-01

## 2019-01-01 RX ORDER — BUTALBITAL, ACETAMINOPHEN AND CAFFEINE 50; 325; 40 MG/1; MG/1; MG/1
1 TABLET ORAL EVERY 6 HOURS PRN
Status: DISCONTINUED | OUTPATIENT
Start: 2019-01-01 | End: 2019-01-01

## 2019-01-01 RX ORDER — POLYETHYLENE GLYCOL 3350 17 G/17G
17 POWDER, FOR SOLUTION ORAL DAILY
Status: DISCONTINUED | OUTPATIENT
Start: 2019-01-01 | End: 2019-01-01

## 2019-01-01 RX ORDER — HYDROCODONE BITARTRATE AND ACETAMINOPHEN 500; 5 MG/1; MG/1
TABLET ORAL
Status: DISCONTINUED | OUTPATIENT
Start: 2019-01-01 | End: 2019-01-01

## 2019-01-01 RX ORDER — ROPIVACAINE HYDROCHLORIDE 2 MG/ML
2 INJECTION, SOLUTION EPIDURAL; INFILTRATION; PERINEURAL CONTINUOUS
Status: DISCONTINUED | OUTPATIENT
Start: 2019-01-01 | End: 2019-01-01

## 2019-01-01 RX ORDER — BUTALBITAL, ACETAMINOPHEN AND CAFFEINE 50; 325; 40 MG/1; MG/1; MG/1
1 TABLET ORAL EVERY 6 HOURS PRN
Qty: 60 TABLET | Refills: 1 | Status: ON HOLD | OUTPATIENT
Start: 2019-01-01 | End: 2019-01-01 | Stop reason: HOSPADM

## 2019-01-01 RX ORDER — OXYCODONE HYDROCHLORIDE 10 MG/1
10 TABLET ORAL EVERY 6 HOURS PRN
Status: DISCONTINUED | OUTPATIENT
Start: 2019-01-01 | End: 2019-01-01 | Stop reason: HOSPADM

## 2019-01-01 RX ORDER — LORAZEPAM 2 MG/1
TABLET ORAL
Qty: 30 TABLET | Refills: 1 | Status: ON HOLD | OUTPATIENT
Start: 2019-01-01 | End: 2019-01-01 | Stop reason: HOSPADM

## 2019-01-01 RX ORDER — PROMETHAZINE HYDROCHLORIDE 25 MG/1
25 TABLET ORAL EVERY 12 HOURS PRN
Status: DISCONTINUED | OUTPATIENT
Start: 2019-01-01 | End: 2019-01-01 | Stop reason: HOSPADM

## 2019-01-01 RX ORDER — ACETAMINOPHEN 10 MG/ML
1000 INJECTION, SOLUTION INTRAVENOUS EVERY 8 HOURS
Status: COMPLETED | OUTPATIENT
Start: 2019-01-01 | End: 2019-01-01

## 2019-01-01 RX ORDER — SODIUM CHLORIDE 0.9 % (FLUSH) 0.9 %
3 SYRINGE (ML) INJECTION
Status: DISCONTINUED | OUTPATIENT
Start: 2019-01-01 | End: 2019-01-01 | Stop reason: HOSPADM

## 2019-01-01 RX ORDER — CLINDAMYCIN HYDROCHLORIDE 300 MG/1
600 CAPSULE ORAL EVERY 6 HOURS
Qty: 2 CAPSULE | Refills: 2 | Status: ON HOLD | OUTPATIENT
Start: 2019-01-01 | End: 2019-01-01 | Stop reason: HOSPADM

## 2019-01-01 RX ORDER — FLUTICASONE PROPIONATE 50 MCG
2 SPRAY, SUSPENSION (ML) NASAL DAILY
Status: DISCONTINUED | OUTPATIENT
Start: 2019-01-01 | End: 2019-01-01

## 2019-01-01 RX ORDER — MONTELUKAST SODIUM 10 MG/1
10 TABLET ORAL DAILY
Status: DISCONTINUED | OUTPATIENT
Start: 2019-01-01 | End: 2019-01-01 | Stop reason: HOSPADM

## 2019-01-01 RX ORDER — HYDROMORPHONE HYDROCHLORIDE 1 MG/ML
INJECTION, SOLUTION INTRAMUSCULAR; INTRAVENOUS; SUBCUTANEOUS
Status: COMPLETED
Start: 2019-01-01 | End: 2019-01-01

## 2019-01-01 RX ORDER — DIPHENHYDRAMINE HYDROCHLORIDE 50 MG/ML
50 INJECTION INTRAMUSCULAR; INTRAVENOUS
Status: DISCONTINUED | OUTPATIENT
Start: 2019-01-01 | End: 2019-01-01

## 2019-01-01 RX ORDER — LIDOCAINE HCL/PF 100 MG/5ML
SYRINGE (ML) INTRAVENOUS
Status: DISCONTINUED | OUTPATIENT
Start: 2019-01-01 | End: 2019-01-01

## 2019-01-01 RX ORDER — MAGNESIUM SULFATE HEPTAHYDRATE 40 MG/ML
2 INJECTION, SOLUTION INTRAVENOUS
Status: DISPENSED | OUTPATIENT
Start: 2019-01-01 | End: 2019-01-01

## 2019-01-01 RX ORDER — PREDNISONE 5 MG/1
25 TABLET ORAL DAILY
Status: DISCONTINUED | OUTPATIENT
Start: 2019-01-01 | End: 2019-01-01

## 2019-01-01 RX ORDER — SODIUM CHLORIDE 0.9 % (FLUSH) 0.9 %
10 SYRINGE (ML) INJECTION EVERY 6 HOURS
Status: DISCONTINUED | OUTPATIENT
Start: 2019-01-01 | End: 2019-01-01

## 2019-01-01 RX ORDER — DEXTROSE 4 G
TABLET,CHEWABLE ORAL 4 TIMES DAILY
Qty: 1 EACH | Refills: 1 | Status: SHIPPED | OUTPATIENT
Start: 2019-01-01

## 2019-01-01 RX ORDER — ROCURONIUM BROMIDE 10 MG/ML
30 INJECTION, SOLUTION INTRAVENOUS ONCE
Status: COMPLETED | OUTPATIENT
Start: 2019-01-01 | End: 2019-01-01

## 2019-01-01 RX ORDER — ONDANSETRON 4 MG/1
8 TABLET, FILM COATED ORAL EVERY 6 HOURS PRN
Status: DISCONTINUED | OUTPATIENT
Start: 2019-01-01 | End: 2019-01-01 | Stop reason: HOSPADM

## 2019-01-01 RX ORDER — PEN NEEDLE, DIABETIC 30 GX3/16"
NEEDLE, DISPOSABLE MISCELLANEOUS
Qty: 100 EACH | Refills: 1 | Status: SHIPPED | OUTPATIENT
Start: 2019-01-01

## 2019-01-01 RX ORDER — LEVALBUTEROL TARTRATE 45 UG/1
AEROSOL, METERED ORAL
Refills: 10 | COMMUNITY
Start: 2018-01-01 | End: 2019-01-01 | Stop reason: SDUPTHER

## 2019-01-01 RX ORDER — MIDAZOLAM HYDROCHLORIDE 1 MG/ML
1 INJECTION INTRAMUSCULAR; INTRAVENOUS
Status: DISCONTINUED | OUTPATIENT
Start: 2019-01-01 | End: 2019-01-01

## 2019-01-01 RX ORDER — ASPIRIN 325 MG
325 TABLET ORAL ONCE
Status: COMPLETED | OUTPATIENT
Start: 2019-01-01 | End: 2019-01-01

## 2019-01-01 RX ORDER — FLUTICASONE PROPIONATE 50 MCG
1 SPRAY, SUSPENSION (ML) NASAL DAILY
Status: DISCONTINUED | OUTPATIENT
Start: 2019-01-01 | End: 2019-01-01 | Stop reason: HOSPADM

## 2019-01-01 RX ORDER — BUPIVACAINE HYDROCHLORIDE 2.5 MG/ML
INJECTION, SOLUTION EPIDURAL; INFILTRATION; INTRACAUDAL
Status: DISPENSED
Start: 2019-01-01 | End: 2019-01-01

## 2019-01-01 RX ORDER — SODIUM CHLORIDE 9 MG/ML
INJECTION, SOLUTION INTRAVENOUS CONTINUOUS
Status: DISCONTINUED | OUTPATIENT
Start: 2019-01-01 | End: 2019-01-01 | Stop reason: HOSPADM

## 2019-01-01 RX ORDER — LEVALBUTEROL 1.25 MG/.5ML
1.25 SOLUTION, CONCENTRATE RESPIRATORY (INHALATION) EVERY 8 HOURS
Status: DISCONTINUED | OUTPATIENT
Start: 2019-01-01 | End: 2019-01-01

## 2019-01-01 RX ORDER — LORAZEPAM 1 MG/1
1 TABLET ORAL EVERY 8 HOURS PRN
Status: DISCONTINUED | OUTPATIENT
Start: 2019-01-01 | End: 2019-01-01 | Stop reason: HOSPADM

## 2019-01-01 RX ORDER — INSULIN ASPART 100 [IU]/ML
1-10 INJECTION, SOLUTION INTRAVENOUS; SUBCUTANEOUS
Qty: 15 ML | Refills: 0 | Status: SHIPPED | OUTPATIENT
Start: 2019-01-01 | End: 2020-08-16

## 2019-01-01 RX ORDER — QUETIAPINE FUMARATE 25 MG/1
50 TABLET, FILM COATED ORAL NIGHTLY
Status: DISCONTINUED | OUTPATIENT
Start: 2019-01-01 | End: 2019-01-01 | Stop reason: HOSPADM

## 2019-01-01 RX ORDER — PROMETHAZINE HYDROCHLORIDE 25 MG/1
25 TABLET ORAL EVERY 6 HOURS PRN
Status: DISCONTINUED | OUTPATIENT
Start: 2019-01-01 | End: 2019-01-01

## 2019-01-01 RX ORDER — EPINEPHRINE 1 MG/ML
INJECTION, SOLUTION INTRACARDIAC; INTRAMUSCULAR; INTRAVENOUS; SUBCUTANEOUS
Status: COMPLETED
Start: 2019-01-01 | End: 2019-01-01

## 2019-01-01 RX ORDER — METOCLOPRAMIDE HYDROCHLORIDE 5 MG/ML
5 INJECTION INTRAMUSCULAR; INTRAVENOUS EVERY 6 HOURS PRN
Status: DISCONTINUED | OUTPATIENT
Start: 2019-01-01 | End: 2019-01-01 | Stop reason: HOSPADM

## 2019-01-01 RX ORDER — CEFEPIME HYDROCHLORIDE 2 G/1
2 INJECTION, POWDER, FOR SOLUTION INTRAVENOUS
Status: DISCONTINUED | OUTPATIENT
Start: 2019-01-01 | End: 2019-01-01

## 2019-01-01 RX ORDER — PROPOFOL 10 MG/ML
VIAL (ML) INTRAVENOUS
Status: DISCONTINUED | OUTPATIENT
Start: 2019-01-01 | End: 2019-01-01

## 2019-01-01 RX ORDER — TOPIRAMATE 25 MG/1
25 TABLET ORAL 2 TIMES DAILY
Status: DISCONTINUED | OUTPATIENT
Start: 2019-01-01 | End: 2019-01-01 | Stop reason: HOSPADM

## 2019-01-01 RX ORDER — FLUCONAZOLE 2 MG/ML
400 INJECTION, SOLUTION INTRAVENOUS
Status: DISCONTINUED | OUTPATIENT
Start: 2019-01-01 | End: 2019-01-01

## 2019-01-01 RX ORDER — POTASSIUM CHLORIDE 20 MEQ/15ML
60 SOLUTION ORAL
Status: DISCONTINUED | OUTPATIENT
Start: 2019-01-01 | End: 2019-01-01 | Stop reason: HOSPADM

## 2019-01-01 RX ORDER — POTASSIUM CHLORIDE 14.9 MG/ML
60 INJECTION INTRAVENOUS
Status: DISCONTINUED | OUTPATIENT
Start: 2019-01-01 | End: 2019-01-01

## 2019-01-01 RX ORDER — PREDNISONE 10 MG/1
70 TABLET ORAL ONCE
Status: COMPLETED | OUTPATIENT
Start: 2019-01-01 | End: 2019-01-01

## 2019-01-01 RX ORDER — GLUCAGON 1 MG
1 KIT INJECTION
Status: DISCONTINUED | OUTPATIENT
Start: 2019-01-01 | End: 2019-01-01 | Stop reason: HOSPADM

## 2019-01-01 RX ORDER — DAPSONE 100 MG/1
100 TABLET ORAL DAILY
Qty: 30 TABLET | Refills: 11
Start: 2019-01-01 | End: 2019-01-01 | Stop reason: SDUPTHER

## 2019-01-01 RX ORDER — TRANEXAMIC ACID 100 MG/ML
INJECTION, SOLUTION INTRAVENOUS
Status: DISCONTINUED | OUTPATIENT
Start: 2019-01-01 | End: 2019-01-01

## 2019-01-01 RX ORDER — LORAZEPAM 2 MG/ML
1 INJECTION INTRAMUSCULAR EVERY 30 MIN PRN
Status: DISCONTINUED | OUTPATIENT
Start: 2019-01-01 | End: 2019-01-01

## 2019-01-01 RX ORDER — ACETAMINOPHEN 325 MG/1
650 TABLET ORAL ONCE
Status: COMPLETED | OUTPATIENT
Start: 2019-01-01 | End: 2019-01-01

## 2019-01-01 RX ORDER — QUETIAPINE FUMARATE 25 MG/1
25 TABLET, FILM COATED ORAL EVERY MORNING
Status: DISCONTINUED | OUTPATIENT
Start: 2019-01-01 | End: 2019-01-01 | Stop reason: HOSPADM

## 2019-01-01 RX ORDER — OMEPRAZOLE 40 MG/1
40 CAPSULE, DELAYED RELEASE ORAL EVERY MORNING
Status: ON HOLD | COMMUNITY
End: 2019-01-01 | Stop reason: SDUPTHER

## 2019-01-01 RX ORDER — PANTOPRAZOLE SODIUM 40 MG/1
40 FOR SUSPENSION ORAL DAILY
Status: DISCONTINUED | OUTPATIENT
Start: 2019-01-01 | End: 2019-01-01

## 2019-01-01 RX ORDER — DIPHENHYDRAMINE HYDROCHLORIDE 50 MG/ML
25 INJECTION INTRAMUSCULAR; INTRAVENOUS 2 TIMES DAILY PRN
Status: DISCONTINUED | OUTPATIENT
Start: 2019-01-01 | End: 2019-01-01

## 2019-01-01 RX ORDER — LACTULOSE 10 G/15ML
15 SOLUTION ORAL EVERY 6 HOURS PRN
Status: DISCONTINUED | OUTPATIENT
Start: 2019-01-01 | End: 2019-01-01 | Stop reason: HOSPADM

## 2019-01-01 RX ORDER — VORICONAZOLE 200 MG/1
200 TABLET, FILM COATED ORAL 2 TIMES DAILY
Qty: 60 TABLET | Refills: 5 | Status: SHIPPED | OUTPATIENT
Start: 2019-01-01

## 2019-01-01 RX ORDER — VENLAFAXINE HYDROCHLORIDE 37.5 MG/1
37.5 CAPSULE, EXTENDED RELEASE ORAL DAILY
COMMUNITY
End: 2019-01-01 | Stop reason: SDUPTHER

## 2019-01-01 RX ORDER — VENLAFAXINE HYDROCHLORIDE 37.5 MG/1
37.5 CAPSULE, EXTENDED RELEASE ORAL DAILY
Qty: 30 CAPSULE | Refills: 1 | Status: SHIPPED | OUTPATIENT
Start: 2019-01-01 | End: 2019-01-01 | Stop reason: SDUPTHER

## 2019-01-01 RX ORDER — LORAZEPAM 1 MG/1
2 TABLET ORAL EVERY 8 HOURS PRN
Status: DISCONTINUED | OUTPATIENT
Start: 2019-01-01 | End: 2019-01-01

## 2019-01-01 RX ORDER — ACETAMINOPHEN 325 MG/1
650 TABLET ORAL EVERY 6 HOURS PRN
Status: DISCONTINUED | OUTPATIENT
Start: 2019-01-01 | End: 2019-01-01 | Stop reason: HOSPADM

## 2019-01-01 RX ORDER — HYDROCODONE BITARTRATE AND ACETAMINOPHEN 500; 5 MG/1; MG/1
TABLET ORAL CONTINUOUS
Status: ACTIVE | OUTPATIENT
Start: 2019-01-01 | End: 2019-01-01

## 2019-01-01 RX ORDER — IBUPROFEN 200 MG
24 TABLET ORAL
Status: DISCONTINUED | OUTPATIENT
Start: 2019-01-01 | End: 2019-01-01

## 2019-01-01 RX ORDER — TIZANIDINE 4 MG/1
TABLET ORAL
Qty: 90 TABLET | Refills: 0 | Status: SHIPPED | OUTPATIENT
Start: 2019-01-01 | End: 2019-01-01 | Stop reason: SDUPTHER

## 2019-01-01 RX ORDER — BUTALBITAL, ACETAMINOPHEN AND CAFFEINE 50; 325; 40 MG/1; MG/1; MG/1
2 TABLET ORAL EVERY 8 HOURS PRN
Status: DISCONTINUED | OUTPATIENT
Start: 2019-01-01 | End: 2019-01-01 | Stop reason: HOSPADM

## 2019-01-01 RX ORDER — DOXYCYCLINE 100 MG/1
100 CAPSULE ORAL EVERY 12 HOURS
Qty: 28 CAPSULE | Refills: 0 | Status: SHIPPED | OUTPATIENT
Start: 2019-01-01 | End: 2019-01-01

## 2019-01-01 RX ORDER — CARBOXYMETHYLCELLULOSE SODIUM 10 MG/ML
GEL OPHTHALMIC 3 TIMES DAILY
Status: DISCONTINUED | OUTPATIENT
Start: 2019-01-01 | End: 2019-01-01

## 2019-01-01 RX ORDER — TACROLIMUS 1 MG/1
1 CAPSULE ORAL EVERY EVENING
Status: DISCONTINUED | OUTPATIENT
Start: 2019-01-01 | End: 2019-01-01

## 2019-01-01 RX ORDER — LANCETS 33 GAUGE
EACH MISCELLANEOUS
Qty: 120 EACH | Refills: 11 | Status: SHIPPED | OUTPATIENT
Start: 2019-01-01

## 2019-01-01 RX ORDER — METOPROLOL TARTRATE 1 MG/ML
INJECTION, SOLUTION INTRAVENOUS
Status: DISPENSED
Start: 2019-01-01 | End: 2019-01-01

## 2019-01-01 RX ORDER — ENOXAPARIN SODIUM 100 MG/ML
40 INJECTION SUBCUTANEOUS EVERY 24 HOURS
Status: DISCONTINUED | OUTPATIENT
Start: 2019-01-01 | End: 2019-01-01 | Stop reason: HOSPADM

## 2019-01-01 RX ORDER — DEXTROSE MONOHYDRATE, SODIUM CHLORIDE, AND POTASSIUM CHLORIDE 50; 1.49; 4.5 G/1000ML; G/1000ML; G/1000ML
INJECTION, SOLUTION INTRAVENOUS CONTINUOUS
Status: DISCONTINUED | OUTPATIENT
Start: 2019-01-01 | End: 2019-01-01

## 2019-01-01 RX ORDER — MAGNESIUM SULFATE HEPTAHYDRATE 40 MG/ML
2 INJECTION, SOLUTION INTRAVENOUS
Status: CANCELLED | OUTPATIENT
Start: 2019-01-01

## 2019-01-01 RX ORDER — NOREPINEPHRINE BITARTRATE/D5W 4MG/250ML
PLASTIC BAG, INJECTION (ML) INTRAVENOUS
Status: COMPLETED
Start: 2019-01-01 | End: 2019-01-01

## 2019-01-01 RX ORDER — METOPROLOL TARTRATE 25 MG/1
25 TABLET, FILM COATED ORAL 3 TIMES DAILY
Status: DISCONTINUED | OUTPATIENT
Start: 2019-01-01 | End: 2019-01-01 | Stop reason: HOSPADM

## 2019-01-01 RX ORDER — SODIUM CHLORIDE FOR INHALATION 7 %
4 VIAL, NEBULIZER (ML) INHALATION EVERY 12 HOURS
Status: DISCONTINUED | OUTPATIENT
Start: 2019-01-01 | End: 2019-01-01 | Stop reason: HOSPADM

## 2019-01-01 RX ORDER — TACROLIMUS 0.5 MG/1
0.5 CAPSULE ORAL EVERY MORNING
Qty: 30 CAPSULE | Refills: 11 | Status: SHIPPED | OUTPATIENT
Start: 2019-01-01 | End: 2020-08-22

## 2019-01-01 RX ORDER — FLUCONAZOLE 150 MG/1
150 TABLET ORAL ONCE
Status: COMPLETED | OUTPATIENT
Start: 2019-01-01 | End: 2019-01-01

## 2019-01-01 RX ORDER — DEXAMETHASONE SODIUM PHOSPHATE 4 MG/ML
INJECTION, SOLUTION INTRA-ARTICULAR; INTRALESIONAL; INTRAMUSCULAR; INTRAVENOUS; SOFT TISSUE
Status: DISCONTINUED | OUTPATIENT
Start: 2019-01-01 | End: 2019-01-01

## 2019-01-01 RX ORDER — PREDNISONE 5 MG/1
35 TABLET ORAL DAILY
Status: COMPLETED | OUTPATIENT
Start: 2019-01-01 | End: 2019-01-01

## 2019-01-01 RX ORDER — ACETAMINOPHEN 500 MG
1000 TABLET ORAL EVERY 6 HOURS PRN
Status: DISCONTINUED | OUTPATIENT
Start: 2019-01-01 | End: 2019-01-01

## 2019-01-01 RX ORDER — SODIUM BICARBONATE 1 MEQ/ML
SYRINGE (ML) INTRAVENOUS CODE/TRAUMA/SEDATION MEDICATION
Status: COMPLETED | OUTPATIENT
Start: 2019-01-01 | End: 2019-01-01

## 2019-01-01 RX ORDER — SODIUM CHLORIDE 0.9 % (FLUSH) 0.9 %
10 SYRINGE (ML) INJECTION
Status: DISCONTINUED | OUTPATIENT
Start: 2019-01-01 | End: 2019-01-01

## 2019-01-01 RX ORDER — FERROUS SULFATE, DRIED 160(50) MG
1 TABLET, EXTENDED RELEASE ORAL 2 TIMES DAILY WITH MEALS
Status: DISCONTINUED | OUTPATIENT
Start: 2019-01-01 | End: 2019-01-01 | Stop reason: HOSPADM

## 2019-01-01 RX ORDER — HEPARIN 100 UNIT/ML
5 SYRINGE INTRAVENOUS ONCE
Status: COMPLETED | OUTPATIENT
Start: 2019-01-01 | End: 2019-01-01

## 2019-01-01 RX ORDER — TACROLIMUS 1 MG/1
1 CAPSULE ORAL EVERY 12 HOURS
Qty: 60 CAPSULE | Refills: 11 | Status: SHIPPED | OUTPATIENT
Start: 2019-01-01 | End: 2020-08-04

## 2019-01-01 RX ORDER — NALOXONE HCL 0.4 MG/ML
0.02 VIAL (ML) INJECTION
Status: DISCONTINUED | OUTPATIENT
Start: 2019-01-01 | End: 2019-01-01 | Stop reason: HOSPADM

## 2019-01-01 RX ORDER — METOPROLOL TARTRATE 1 MG/ML
5 INJECTION, SOLUTION INTRAVENOUS EVERY 6 HOURS PRN
Status: DISCONTINUED | OUTPATIENT
Start: 2019-01-01 | End: 2019-01-01 | Stop reason: HOSPADM

## 2019-01-01 RX ORDER — SODIUM CHLORIDE 9 MG/ML
INJECTION, SOLUTION INTRAVENOUS CONTINUOUS PRN
Status: DISCONTINUED | OUTPATIENT
Start: 2019-01-01 | End: 2019-01-01

## 2019-01-01 RX ORDER — IBUPROFEN 200 MG
16 TABLET ORAL
Status: DISCONTINUED | OUTPATIENT
Start: 2019-01-01 | End: 2019-01-01

## 2019-01-01 RX ORDER — FOLIC ACID 1 MG/1
1000 TABLET ORAL DAILY
Qty: 30 TABLET | Refills: 11 | Status: CANCELLED | OUTPATIENT
Start: 2019-01-01

## 2019-01-01 RX ORDER — DEXAMETHASONE SODIUM PHOSPHATE 10 MG/ML
INJECTION INTRAMUSCULAR; INTRAVENOUS
Status: COMPLETED
Start: 2019-01-01 | End: 2019-01-01

## 2019-01-01 RX ORDER — HEPARIN SODIUM 5000 [USP'U]/ML
5000 INJECTION, SOLUTION INTRAVENOUS; SUBCUTANEOUS EVERY 12 HOURS
Status: DISCONTINUED | OUTPATIENT
Start: 2019-01-01 | End: 2019-01-01

## 2019-01-01 RX ORDER — ALBUMIN HUMAN 50 G/1000ML
25 SOLUTION INTRAVENOUS ONCE
Status: COMPLETED | OUTPATIENT
Start: 2019-01-01 | End: 2019-01-01

## 2019-01-01 RX ORDER — SYRING-NEEDL,DISP,INSUL,0.3 ML 29 G X1/2"
296 SYRINGE, EMPTY DISPOSABLE MISCELLANEOUS ONCE
Status: COMPLETED | OUTPATIENT
Start: 2019-01-01 | End: 2019-01-01

## 2019-01-01 RX ORDER — MORPHINE SULFATE 2 MG/ML
3 INJECTION, SOLUTION INTRAMUSCULAR; INTRAVENOUS EVERY 10 MIN PRN
Status: DISCONTINUED | OUTPATIENT
Start: 2019-01-01 | End: 2019-01-01 | Stop reason: HOSPADM

## 2019-01-01 RX ORDER — QUETIAPINE FUMARATE 25 MG/1
TABLET, FILM COATED ORAL
Qty: 90 TABLET | Refills: 1 | Status: SHIPPED | OUTPATIENT
Start: 2019-01-01 | End: 2019-01-01 | Stop reason: SDUPTHER

## 2019-01-01 RX ORDER — LACTULOSE 10 G/15ML
15 SOLUTION ORAL 3 TIMES DAILY
Status: DISCONTINUED | OUTPATIENT
Start: 2019-01-01 | End: 2019-01-01

## 2019-01-01 RX ORDER — PROPOFOL 10 MG/ML
5 INJECTION, EMULSION INTRAVENOUS CONTINUOUS
Status: DISCONTINUED | OUTPATIENT
Start: 2019-01-01 | End: 2019-01-01

## 2019-01-01 RX ORDER — OXYCODONE HYDROCHLORIDE 10 MG/1
10 TABLET ORAL EVERY 8 HOURS PRN
Status: DISCONTINUED | OUTPATIENT
Start: 2019-01-01 | End: 2019-01-01

## 2019-01-01 RX ORDER — LANOLIN ALCOHOL/MO/W.PET/CERES
800 CREAM (GRAM) TOPICAL 2 TIMES DAILY
Status: DISCONTINUED | OUTPATIENT
Start: 2019-01-01 | End: 2019-01-01 | Stop reason: HOSPADM

## 2019-01-01 RX ORDER — LEVALBUTEROL 1.25 MG/.5ML
1 SOLUTION, CONCENTRATE RESPIRATORY (INHALATION) EVERY 8 HOURS PRN
Status: DISCONTINUED | OUTPATIENT
Start: 2019-01-01 | End: 2019-01-01 | Stop reason: HOSPADM

## 2019-01-01 RX ORDER — SODIUM BICARBONATE 1 MEQ/ML
50 SYRINGE (ML) INTRAVENOUS ONCE
Status: COMPLETED | OUTPATIENT
Start: 2019-01-01 | End: 2019-01-01

## 2019-01-01 RX ORDER — FENTANYL CITRATE 50 UG/ML
INJECTION, SOLUTION INTRAMUSCULAR; INTRAVENOUS
Status: DISPENSED
Start: 2019-01-01 | End: 2019-01-01

## 2019-01-01 RX ORDER — LEVALBUTEROL 1.25 MG/.5ML
1.25 SOLUTION, CONCENTRATE RESPIRATORY (INHALATION) EVERY 8 HOURS PRN
Status: DISCONTINUED | OUTPATIENT
Start: 2019-01-01 | End: 2019-01-01 | Stop reason: HOSPADM

## 2019-01-01 RX ORDER — QUETIAPINE FUMARATE 25 MG/1
TABLET, FILM COATED ORAL
Qty: 90 TABLET | Refills: 2 | Status: SHIPPED | OUTPATIENT
Start: 2019-01-01

## 2019-01-01 RX ORDER — INSULIN ASPART 100 [IU]/ML
0-5 INJECTION, SOLUTION INTRAVENOUS; SUBCUTANEOUS EVERY 6 HOURS PRN
Status: DISCONTINUED | OUTPATIENT
Start: 2019-01-01 | End: 2019-01-01

## 2019-01-01 RX ORDER — MORPHINE SULFATE 15 MG/1
15 TABLET, FILM COATED, EXTENDED RELEASE ORAL EVERY 12 HOURS
Status: DISCONTINUED | OUTPATIENT
Start: 2019-01-01 | End: 2019-01-01 | Stop reason: HOSPADM

## 2019-01-01 RX ORDER — MYCOPHENOLATE MOFETIL 200 MG/ML
500 POWDER, FOR SUSPENSION ORAL 2 TIMES DAILY
Status: DISCONTINUED | OUTPATIENT
Start: 2019-01-01 | End: 2019-01-01

## 2019-01-01 RX ORDER — OXYCODONE HYDROCHLORIDE 5 MG/1
5 TABLET ORAL EVERY 6 HOURS PRN
Status: DISCONTINUED | OUTPATIENT
Start: 2019-01-01 | End: 2019-01-01

## 2019-01-01 RX ORDER — GABAPENTIN 300 MG/1
300 CAPSULE ORAL 3 TIMES DAILY
Status: DISCONTINUED | OUTPATIENT
Start: 2019-01-01 | End: 2019-01-01

## 2019-01-01 RX ORDER — OMEPRAZOLE 40 MG/1
40 CAPSULE, DELAYED RELEASE ORAL
Qty: 60 CAPSULE | Refills: 11 | Status: SHIPPED | OUTPATIENT
Start: 2019-01-01 | End: 2019-01-01

## 2019-01-01 RX ORDER — BUTALBITAL, ACETAMINOPHEN AND CAFFEINE 50; 325; 40 MG/1; MG/1; MG/1
2 TABLET ORAL ONCE
Status: COMPLETED | OUTPATIENT
Start: 2019-01-01 | End: 2019-01-01

## 2019-01-01 RX ORDER — ESOMEPRAZOLE MAGNESIUM 40 MG/1
40 CAPSULE, DELAYED RELEASE ORAL
Qty: 30 CAPSULE | Refills: 11 | Status: SHIPPED | OUTPATIENT
Start: 2019-01-01 | End: 2019-01-01 | Stop reason: ALTCHOICE

## 2019-01-01 RX ORDER — ACETAMINOPHEN 325 MG/1
650 TABLET ORAL EVERY 6 HOURS PRN
Status: DISCONTINUED | OUTPATIENT
Start: 2019-01-01 | End: 2019-01-01

## 2019-01-01 RX ORDER — HYDROCORTISONE 1 %
CREAM (GRAM) TOPICAL 2 TIMES DAILY PRN
Status: DISCONTINUED | OUTPATIENT
Start: 2019-01-01 | End: 2019-01-01 | Stop reason: HOSPADM

## 2019-01-01 RX ORDER — TIZANIDINE 4 MG/1
4 TABLET ORAL EVERY 6 HOURS PRN
Status: DISCONTINUED | OUTPATIENT
Start: 2019-01-01 | End: 2019-01-01 | Stop reason: HOSPADM

## 2019-01-01 RX ORDER — FENTANYL CITRATE 50 UG/ML
INJECTION, SOLUTION INTRAMUSCULAR; INTRAVENOUS
Status: DISCONTINUED | OUTPATIENT
Start: 2019-01-01 | End: 2019-01-01

## 2019-01-01 RX ORDER — HEPARIN 100 UNIT/ML
100 SYRINGE INTRAVENOUS ONCE
Status: COMPLETED | OUTPATIENT
Start: 2019-01-01 | End: 2019-01-01

## 2019-01-01 RX ORDER — POTASSIUM CHLORIDE 14.9 MG/ML
60 INJECTION INTRAVENOUS
Status: DISCONTINUED | OUTPATIENT
Start: 2019-01-01 | End: 2019-01-01 | Stop reason: HOSPADM

## 2019-01-01 RX ORDER — PSEUDOEPHEDRINE/ACETAMINOPHEN 30MG-500MG
100 TABLET ORAL
Status: DISPENSED | OUTPATIENT
Start: 2019-01-01 | End: 2019-01-01

## 2019-01-01 RX ORDER — BUTALBITAL, ACETAMINOPHEN AND CAFFEINE 50; 325; 40 MG/1; MG/1; MG/1
1 TABLET ORAL EVERY 6 HOURS PRN
Status: ON HOLD | COMMUNITY
End: 2019-01-01 | Stop reason: SDUPTHER

## 2019-01-01 RX ORDER — HEPARIN SODIUM 5000 [USP'U]/ML
5000 INJECTION, SOLUTION INTRAVENOUS; SUBCUTANEOUS EVERY 8 HOURS
Status: DISCONTINUED | OUTPATIENT
Start: 2019-01-01 | End: 2019-01-01

## 2019-01-01 RX ORDER — POTASSIUM CHLORIDE 29.8 MG/ML
40 INJECTION INTRAVENOUS
Status: DISCONTINUED | OUTPATIENT
Start: 2019-01-01 | End: 2019-01-01

## 2019-01-01 RX ORDER — ACETAMINOPHEN 500 MG
1000 TABLET ORAL EVERY 8 HOURS
Status: DISCONTINUED | OUTPATIENT
Start: 2019-01-01 | End: 2019-01-01

## 2019-01-01 RX ORDER — ONDANSETRON 2 MG/ML
8 INJECTION INTRAMUSCULAR; INTRAVENOUS EVERY 6 HOURS PRN
Status: DISCONTINUED | OUTPATIENT
Start: 2019-01-01 | End: 2019-01-01 | Stop reason: HOSPADM

## 2019-01-01 RX ORDER — HYDROCORTISONE 1 %
CREAM (GRAM) TOPICAL 2 TIMES DAILY
Status: DISCONTINUED | OUTPATIENT
Start: 2019-01-01 | End: 2019-01-01 | Stop reason: HOSPADM

## 2019-01-01 RX ORDER — LORAZEPAM 1 MG/1
1 TABLET ORAL EVERY 8 HOURS PRN
Status: DISCONTINUED | OUTPATIENT
Start: 2019-01-01 | End: 2019-01-01

## 2019-01-01 RX ORDER — MIDAZOLAM HYDROCHLORIDE 1 MG/ML
2 INJECTION INTRAMUSCULAR; INTRAVENOUS EVERY 4 HOURS PRN
Status: DISCONTINUED | OUTPATIENT
Start: 2019-01-01 | End: 2019-01-01

## 2019-01-01 RX ORDER — LORAZEPAM 2 MG/1
TABLET ORAL
Qty: 30 TABLET | Refills: 1 | Status: SHIPPED | OUTPATIENT
Start: 2019-01-01 | End: 2019-01-01

## 2019-01-01 RX ORDER — QUETIAPINE FUMARATE 25 MG/1
50 TABLET, FILM COATED ORAL 2 TIMES DAILY
Status: DISCONTINUED | OUTPATIENT
Start: 2019-01-01 | End: 2019-01-01

## 2019-01-01 RX ORDER — NICARDIPINE HYDROCHLORIDE 0.2 MG/ML
1 INJECTION INTRAVENOUS CONTINUOUS
Status: DISCONTINUED | OUTPATIENT
Start: 2019-01-01 | End: 2019-01-01

## 2019-01-01 RX ORDER — BISACODYL 5 MG
10 TABLET, DELAYED RELEASE (ENTERIC COATED) ORAL DAILY
Status: DISCONTINUED | OUTPATIENT
Start: 2019-01-01 | End: 2019-01-01

## 2019-01-01 RX ORDER — MORPHINE SULFATE 15 MG/1
15 TABLET ORAL EVERY 6 HOURS
Status: DISCONTINUED | OUTPATIENT
Start: 2019-01-01 | End: 2019-01-01

## 2019-01-01 RX ORDER — ROCURONIUM BROMIDE 10 MG/ML
INJECTION, SOLUTION INTRAVENOUS
Status: DISCONTINUED | OUTPATIENT
Start: 2019-01-01 | End: 2019-01-01

## 2019-01-01 RX ORDER — TOPIRAMATE 25 MG/1
25 TABLET ORAL 2 TIMES DAILY
Qty: 60 TABLET | Refills: 1 | Status: SHIPPED | OUTPATIENT
Start: 2019-01-01 | End: 2020-05-02

## 2019-01-01 RX ORDER — VENLAFAXINE HYDROCHLORIDE 150 MG/1
150 CAPSULE, EXTENDED RELEASE ORAL NIGHTLY
Status: DISCONTINUED | OUTPATIENT
Start: 2019-01-01 | End: 2019-01-01 | Stop reason: HOSPADM

## 2019-01-01 RX ORDER — LEVALBUTEROL 1.25 MG/.5ML
1.25 SOLUTION, CONCENTRATE RESPIRATORY (INHALATION) EVERY 6 HOURS
Status: DISCONTINUED | OUTPATIENT
Start: 2019-01-01 | End: 2019-01-01

## 2019-01-01 RX ORDER — PREDNISONE 10 MG/1
20 TABLET ORAL DAILY
Status: DISCONTINUED | OUTPATIENT
Start: 2019-01-01 | End: 2019-01-01 | Stop reason: HOSPADM

## 2019-01-01 RX ORDER — LORAZEPAM 2 MG/1
TABLET ORAL
Qty: 30 TABLET | Refills: 1 | Status: SHIPPED | OUTPATIENT
Start: 2019-01-01 | End: 2019-01-01 | Stop reason: SDUPTHER

## 2019-01-01 RX ORDER — LIDOCAINE HYDROCHLORIDE 20 MG/ML
INJECTION, SOLUTION INFILTRATION; PERINEURAL
Status: DISCONTINUED | OUTPATIENT
Start: 2019-01-01 | End: 2019-01-01 | Stop reason: HOSPADM

## 2019-01-01 RX ORDER — LEVALBUTEROL INHALATION SOLUTION 0.63 MG/3ML
SOLUTION RESPIRATORY (INHALATION)
Status: DISPENSED
Start: 2019-01-01 | End: 2019-01-01

## 2019-01-01 RX ORDER — HYDROMORPHONE HCL IN 0.9% NACL 6 MG/30 ML
PATIENT CONTROLLED ANALGESIA SYRINGE INTRAVENOUS CONTINUOUS
Status: DISPENSED | OUTPATIENT
Start: 2019-01-01 | End: 2019-01-01

## 2019-01-01 RX ORDER — BUPIVACAINE HYDROCHLORIDE 7.5 MG/ML
INJECTION, SOLUTION EPIDURAL; RETROBULBAR
Status: COMPLETED | OUTPATIENT
Start: 2019-01-01 | End: 2019-01-01

## 2019-01-01 RX ORDER — FOLIC ACID 1 MG/1
1000 TABLET ORAL DAILY
Status: DISCONTINUED | OUTPATIENT
Start: 2019-01-01 | End: 2019-01-01 | Stop reason: HOSPADM

## 2019-01-01 RX ORDER — HYDROMORPHONE HYDROCHLORIDE 1 MG/ML
0.5 INJECTION, SOLUTION INTRAMUSCULAR; INTRAVENOUS; SUBCUTANEOUS EVERY 8 HOURS PRN
Status: DISPENSED | OUTPATIENT
Start: 2019-01-01 | End: 2019-01-01

## 2019-01-01 RX ORDER — DIPHENHYDRAMINE HCL 25 MG
25 CAPSULE ORAL EVERY 6 HOURS PRN
Status: DISCONTINUED | OUTPATIENT
Start: 2019-01-01 | End: 2019-01-01 | Stop reason: HOSPADM

## 2019-01-01 RX ORDER — HYDROXYZINE HYDROCHLORIDE 25 MG/1
25 TABLET, FILM COATED ORAL 3 TIMES DAILY PRN
Status: DISCONTINUED | OUTPATIENT
Start: 2019-01-01 | End: 2019-01-01 | Stop reason: HOSPADM

## 2019-01-01 RX ORDER — LEVALBUTEROL 1.25 MG/.5ML
1.25 SOLUTION, CONCENTRATE RESPIRATORY (INHALATION)
Status: DISCONTINUED | OUTPATIENT
Start: 2019-01-01 | End: 2019-01-01 | Stop reason: HOSPADM

## 2019-01-01 RX ORDER — CHLORHEXIDINE GLUCONATE ORAL RINSE 1.2 MG/ML
15 SOLUTION DENTAL 2 TIMES DAILY
Status: DISCONTINUED | OUTPATIENT
Start: 2019-01-01 | End: 2019-01-01

## 2019-01-01 RX ORDER — LORAZEPAM 2 MG/ML
INJECTION INTRAMUSCULAR
Status: COMPLETED
Start: 2019-01-01 | End: 2019-01-01

## 2019-01-01 RX ORDER — PHENYLEPHRINE HCL IN 0.9% NACL 1 MG/10 ML
SYRINGE (ML) INTRAVENOUS
Status: COMPLETED
Start: 2019-01-01 | End: 2019-01-01

## 2019-01-01 RX ORDER — ACETYLCYSTEINE 200 MG/ML
4 SOLUTION ORAL; RESPIRATORY (INHALATION) EVERY 12 HOURS
Status: DISCONTINUED | OUTPATIENT
Start: 2019-01-01 | End: 2019-01-01

## 2019-01-01 RX ORDER — KETAMINE HCL IN 0.9 % NACL 50 MG/5 ML
SYRINGE (ML) INTRAVENOUS
Status: DISCONTINUED | OUTPATIENT
Start: 2019-01-01 | End: 2019-01-01

## 2019-01-01 RX ORDER — ALBUMIN HUMAN 50 G/1000ML
12.5 SOLUTION INTRAVENOUS ONCE
Status: COMPLETED | OUTPATIENT
Start: 2019-01-01 | End: 2019-01-01

## 2019-01-01 RX ORDER — POTASSIUM CHLORIDE 14.9 MG/ML
20 INJECTION INTRAVENOUS
Status: DISCONTINUED | OUTPATIENT
Start: 2019-01-01 | End: 2019-01-01

## 2019-01-01 RX ORDER — CALCIUM GLUCONATE 98 MG/ML
INJECTION, SOLUTION INTRAVENOUS
Status: DISCONTINUED | OUTPATIENT
Start: 2019-01-01 | End: 2019-01-01

## 2019-01-01 RX ORDER — LACTULOSE 10 G/15ML
20 SOLUTION ORAL EVERY 6 HOURS PRN
Status: DISCONTINUED | OUTPATIENT
Start: 2019-01-01 | End: 2019-01-01

## 2019-01-01 RX ORDER — LACTULOSE 10 G/15ML
20 SOLUTION ORAL EVERY 6 HOURS PRN
Status: DISCONTINUED | OUTPATIENT
Start: 2019-01-01 | End: 2019-01-01 | Stop reason: HOSPADM

## 2019-01-01 RX ORDER — FLUCONAZOLE 40 MG/ML
400 POWDER, FOR SUSPENSION ORAL DAILY
Status: DISCONTINUED | OUTPATIENT
Start: 2019-01-01 | End: 2019-01-01

## 2019-01-01 RX ORDER — FUROSEMIDE 10 MG/ML
80 INJECTION INTRAMUSCULAR; INTRAVENOUS ONCE
Status: COMPLETED | OUTPATIENT
Start: 2019-01-01 | End: 2019-01-01

## 2019-01-01 RX ORDER — VENLAFAXINE HYDROCHLORIDE 150 MG/1
150 CAPSULE, EXTENDED RELEASE ORAL DAILY
COMMUNITY
End: 2019-01-01 | Stop reason: SDUPTHER

## 2019-01-01 RX ORDER — GABAPENTIN 300 MG/1
300 CAPSULE ORAL 3 TIMES DAILY
Qty: 90 CAPSULE | Refills: 11 | Status: SHIPPED | OUTPATIENT
Start: 2019-01-01

## 2019-01-01 RX ORDER — INSULIN ASPART 100 [IU]/ML
1-10 INJECTION, SOLUTION INTRAVENOUS; SUBCUTANEOUS
Status: DISCONTINUED | OUTPATIENT
Start: 2019-01-01 | End: 2019-01-01 | Stop reason: HOSPADM

## 2019-01-01 RX ORDER — POTASSIUM CHLORIDE 14.9 MG/ML
20 INJECTION INTRAVENOUS
Status: DISCONTINUED | OUTPATIENT
Start: 2019-01-01 | End: 2019-01-01 | Stop reason: HOSPADM

## 2019-01-01 RX ORDER — LEVALBUTEROL 1.25 MG/.5ML
1.25 SOLUTION, CONCENTRATE RESPIRATORY (INHALATION)
Status: COMPLETED | OUTPATIENT
Start: 2019-01-01 | End: 2019-01-01

## 2019-01-01 RX ORDER — INSULIN ASPART 100 [IU]/ML
0-4 INJECTION, SOLUTION INTRAVENOUS; SUBCUTANEOUS
Status: DISCONTINUED | OUTPATIENT
Start: 2019-01-01 | End: 2019-01-01

## 2019-01-01 RX ORDER — HEPARIN 100 UNIT/ML
5 SYRINGE INTRAVENOUS
Status: DISCONTINUED | OUTPATIENT
Start: 2019-01-01 | End: 2019-01-01 | Stop reason: HOSPADM

## 2019-01-01 RX ORDER — MORPHINE SULFATE 2 MG/ML
6 INJECTION, SOLUTION INTRAMUSCULAR; INTRAVENOUS
Status: DISCONTINUED | OUTPATIENT
Start: 2019-01-01 | End: 2019-01-01

## 2019-01-01 RX ORDER — DIAZEPAM 10 MG/2ML
10 INJECTION INTRAMUSCULAR EVERY 4 HOURS PRN
Status: DISCONTINUED | OUTPATIENT
Start: 2019-01-01 | End: 2019-01-01

## 2019-01-01 RX ORDER — HYDROMORPHONE HYDROCHLORIDE 1 MG/ML
1 INJECTION, SOLUTION INTRAMUSCULAR; INTRAVENOUS; SUBCUTANEOUS
Status: DISCONTINUED | OUTPATIENT
Start: 2019-01-01 | End: 2019-01-01

## 2019-01-01 RX ORDER — DIPHENHYDRAMINE HYDROCHLORIDE 50 MG/ML
12.5 INJECTION INTRAMUSCULAR; INTRAVENOUS EVERY 6 HOURS PRN
Status: DISCONTINUED | OUTPATIENT
Start: 2019-01-01 | End: 2019-01-01

## 2019-01-01 RX ORDER — PREDNISONE 5 MG/1
5 TABLET ORAL DAILY
Status: DISCONTINUED | OUTPATIENT
Start: 2019-01-01 | End: 2019-01-01 | Stop reason: HOSPADM

## 2019-01-01 RX ORDER — CIPROFLOXACIN 2 MG/ML
400 INJECTION, SOLUTION INTRAVENOUS EVERY 8 HOURS
Status: DISCONTINUED | OUTPATIENT
Start: 2019-01-01 | End: 2019-01-01

## 2019-01-01 RX ORDER — PREDNISONE 20 MG/1
20 TABLET ORAL DAILY
Qty: 5 TABLET | Refills: 0 | Status: SHIPPED | OUTPATIENT
Start: 2019-01-01 | End: 2019-01-01

## 2019-01-01 RX ORDER — PANTOPRAZOLE SODIUM 40 MG/10ML
40 INJECTION, POWDER, LYOPHILIZED, FOR SOLUTION INTRAVENOUS DAILY
Status: DISCONTINUED | OUTPATIENT
Start: 2019-01-01 | End: 2019-01-01

## 2019-01-01 RX ORDER — LIDOCAINE HYDROCHLORIDE AND EPINEPHRINE 10; 10 MG/ML; UG/ML
INJECTION, SOLUTION INFILTRATION; PERINEURAL
Status: DISCONTINUED | OUTPATIENT
Start: 2019-01-01 | End: 2019-01-01 | Stop reason: HOSPADM

## 2019-01-01 RX ORDER — DIPHENHYDRAMINE HYDROCHLORIDE 50 MG/ML
25 INJECTION INTRAMUSCULAR; INTRAVENOUS EVERY 6 HOURS PRN
Status: DISCONTINUED | OUTPATIENT
Start: 2019-01-01 | End: 2019-01-01

## 2019-01-01 RX ORDER — HYDROMORPHONE HYDROCHLORIDE 1 MG/ML
0.75 INJECTION, SOLUTION INTRAMUSCULAR; INTRAVENOUS; SUBCUTANEOUS EVERY 4 HOURS PRN
Status: DISCONTINUED | OUTPATIENT
Start: 2019-01-01 | End: 2019-01-01

## 2019-01-01 RX ORDER — LEVALBUTEROL INHALATION SOLUTION 0.63 MG/3ML
0.63 SOLUTION RESPIRATORY (INHALATION)
Status: DISCONTINUED | OUTPATIENT
Start: 2019-01-01 | End: 2019-01-01

## 2019-01-01 RX ORDER — ROPIVACAINE HYDROCHLORIDE 7.5 MG/ML
INJECTION, SOLUTION EPIDURAL; PERINEURAL
Status: COMPLETED
Start: 2019-01-01 | End: 2019-01-01

## 2019-01-01 RX ORDER — CHLORHEXIDINE GLUCONATE ORAL RINSE 1.2 MG/ML
10 SOLUTION DENTAL 2 TIMES DAILY
Status: COMPLETED | OUTPATIENT
Start: 2019-01-01 | End: 2019-01-01

## 2019-01-01 RX ORDER — DIPHENHYDRAMINE HYDROCHLORIDE 50 MG/ML
12.5 INJECTION INTRAMUSCULAR; INTRAVENOUS ONCE
Status: COMPLETED | OUTPATIENT
Start: 2019-01-01 | End: 2019-01-01

## 2019-01-01 RX ORDER — GABAPENTIN 250 MG/5ML
250 SOLUTION ORAL EVERY 8 HOURS
Status: DISCONTINUED | OUTPATIENT
Start: 2019-01-01 | End: 2019-01-01

## 2019-01-01 RX ORDER — SODIUM CHLORIDE 0.9 G/100ML
IRRIGANT IRRIGATION
Status: DISCONTINUED | OUTPATIENT
Start: 2019-01-01 | End: 2019-01-01 | Stop reason: HOSPADM

## 2019-01-01 RX ORDER — METOPROLOL TARTRATE 1 MG/ML
10 INJECTION, SOLUTION INTRAVENOUS EVERY 5 MIN PRN
Status: DISCONTINUED | OUTPATIENT
Start: 2019-01-01 | End: 2019-01-01 | Stop reason: HOSPADM

## 2019-01-01 RX ORDER — ROCURONIUM BROMIDE 10 MG/ML
INJECTION, SOLUTION INTRAVENOUS
Status: COMPLETED
Start: 2019-01-01 | End: 2019-01-01

## 2019-01-01 RX ORDER — TOPIRAMATE 25 MG/1
25 TABLET ORAL 2 TIMES DAILY
Qty: 60 TABLET | Refills: 1 | Status: ON HOLD
Start: 2019-01-01 | End: 2019-01-01 | Stop reason: SDUPTHER

## 2019-01-01 RX ORDER — TACROLIMUS 1 MG/1
2 CAPSULE ORAL 2 TIMES DAILY
Status: DISCONTINUED | OUTPATIENT
Start: 2019-01-01 | End: 2019-01-01

## 2019-01-01 RX ORDER — KETOROLAC TROMETHAMINE 15 MG/ML
15 INJECTION, SOLUTION INTRAMUSCULAR; INTRAVENOUS ONCE
Status: COMPLETED | OUTPATIENT
Start: 2019-01-01 | End: 2019-01-01

## 2019-01-01 RX ORDER — OMEPRAZOLE 40 MG/1
40 CAPSULE, DELAYED RELEASE ORAL 2 TIMES DAILY
Status: DISCONTINUED | OUTPATIENT
Start: 2019-01-01 | End: 2019-01-01 | Stop reason: HOSPADM

## 2019-01-01 RX ORDER — LIDOCAINE HYDROCHLORIDE 10 MG/ML
INJECTION, SOLUTION EPIDURAL; INFILTRATION; INTRACAUDAL; PERINEURAL
Status: DISCONTINUED | OUTPATIENT
Start: 2019-01-01 | End: 2019-01-01 | Stop reason: HOSPADM

## 2019-01-01 RX ORDER — MYCOPHENOLATE MOFETIL 250 MG/1
250 CAPSULE ORAL 2 TIMES DAILY
Qty: 120 CAPSULE | Refills: 11
Start: 2019-01-01 | End: 2019-01-01

## 2019-01-01 RX ORDER — DIPHENHYDRAMINE HCL 50 MG
50 CAPSULE ORAL
Status: ON HOLD | COMMUNITY
Start: 2014-08-19 | End: 2019-01-01 | Stop reason: HOSPADM

## 2019-01-01 RX ORDER — OXYCODONE HYDROCHLORIDE 5 MG/1
10 TABLET ORAL EVERY 4 HOURS PRN
Status: DISCONTINUED | OUTPATIENT
Start: 2019-01-01 | End: 2019-01-01

## 2019-01-01 RX ORDER — PHENYLEPHRINE HCL IN 0.9% NACL 20MG/250ML
0.5 PLASTIC BAG, INJECTION (ML) INTRAVENOUS CONTINUOUS
Status: DISCONTINUED | OUTPATIENT
Start: 2019-01-01 | End: 2019-01-01

## 2019-01-01 RX ORDER — VANCOMYCIN HCL IN 5 % DEXTROSE 1.25 G/25
1250 PLASTIC BAG, INJECTION (ML) INTRAVENOUS
Status: DISCONTINUED | OUTPATIENT
Start: 2019-01-01 | End: 2019-01-01

## 2019-01-01 RX ORDER — PANTOPRAZOLE SODIUM 40 MG/1
40 TABLET, DELAYED RELEASE ORAL DAILY
Status: DISCONTINUED | OUTPATIENT
Start: 2019-01-01 | End: 2019-01-01

## 2019-01-01 RX ORDER — POTASSIUM CHLORIDE 20 MEQ/15ML
60 SOLUTION ORAL
Status: CANCELLED | OUTPATIENT
Start: 2019-01-01

## 2019-01-01 RX ORDER — HYDROCORTISONE 1 %
CREAM (GRAM) TOPICAL 2 TIMES DAILY
Qty: 28.35 G | Refills: 0 | Status: SHIPPED | OUTPATIENT
Start: 2019-01-01

## 2019-01-01 RX ORDER — LORAZEPAM 2 MG/ML
2 INJECTION INTRAMUSCULAR EVERY 4 HOURS PRN
Status: DISCONTINUED | OUTPATIENT
Start: 2019-01-01 | End: 2019-01-01

## 2019-01-01 RX ORDER — KETAMINE HYDROCHLORIDE 10 MG/ML
INJECTION, SOLUTION INTRAMUSCULAR; INTRAVENOUS
Status: DISCONTINUED | OUTPATIENT
Start: 2019-01-01 | End: 2019-01-01

## 2019-01-01 RX ORDER — FUROSEMIDE 10 MG/ML
40 INJECTION INTRAMUSCULAR; INTRAVENOUS ONCE
Status: COMPLETED | OUTPATIENT
Start: 2019-01-01 | End: 2019-01-01

## 2019-01-01 RX ORDER — TACROLIMUS 1 MG/1
1 CAPSULE ORAL EVERY 12 HOURS
Qty: 60 CAPSULE | Refills: 11
Start: 2019-01-01 | End: 2019-01-01 | Stop reason: SDUPTHER

## 2019-01-01 RX ORDER — LORAZEPAM 1 MG/1
2 TABLET ORAL EVERY 6 HOURS PRN
Qty: 30 TABLET | Refills: 2 | Status: SHIPPED | OUTPATIENT
Start: 2019-01-01 | End: 2019-01-01

## 2019-01-01 RX ORDER — CIPROFLOXACIN AND DEXAMETHASONE 3; 1 MG/ML; MG/ML
4 SUSPENSION/ DROPS AURICULAR (OTIC) 2 TIMES DAILY
Qty: 7.5 ML | Refills: 0 | Status: SHIPPED | OUTPATIENT
Start: 2019-01-01 | End: 2019-01-01

## 2019-01-01 RX ORDER — POTASSIUM CHLORIDE 14.9 MG/ML
INJECTION INTRAVENOUS CONTINUOUS PRN
Status: DISCONTINUED | OUTPATIENT
Start: 2019-01-01 | End: 2019-01-01

## 2019-01-01 RX ORDER — OXYCODONE HYDROCHLORIDE 10 MG/1
10 TABLET ORAL EVERY 4 HOURS PRN
Status: ACTIVE | OUTPATIENT
Start: 2019-01-01 | End: 2019-01-01

## 2019-01-01 RX ORDER — VENLAFAXINE HYDROCHLORIDE 150 MG/1
150 CAPSULE, EXTENDED RELEASE ORAL DAILY
Qty: 30 CAPSULE | Refills: 2 | Status: SHIPPED | OUTPATIENT
Start: 2019-01-01

## 2019-01-01 RX ORDER — QUETIAPINE FUMARATE 25 MG/1
50 TABLET, FILM COATED ORAL NIGHTLY
Status: DISCONTINUED | OUTPATIENT
Start: 2019-01-01 | End: 2019-01-01

## 2019-01-01 RX ORDER — DIPHENHYDRAMINE HYDROCHLORIDE 50 MG/ML
25 INJECTION INTRAMUSCULAR; INTRAVENOUS 3 TIMES DAILY PRN
Status: DISCONTINUED | OUTPATIENT
Start: 2019-01-01 | End: 2019-01-01 | Stop reason: HOSPADM

## 2019-01-01 RX ORDER — MIDAZOLAM HYDROCHLORIDE 1 MG/ML
INJECTION INTRAMUSCULAR; INTRAVENOUS CODE/TRAUMA/SEDATION MEDICATION
Status: COMPLETED | OUTPATIENT
Start: 2019-01-01 | End: 2019-01-01

## 2019-01-01 RX ORDER — CEFEPIME HYDROCHLORIDE 1 G/1
1 INJECTION, POWDER, FOR SOLUTION INTRAMUSCULAR; INTRAVENOUS
Status: DISCONTINUED | OUTPATIENT
Start: 2019-01-01 | End: 2019-01-01 | Stop reason: HOSPADM

## 2019-01-01 RX ORDER — QUETIAPINE FUMARATE 25 MG/1
25 TABLET, FILM COATED ORAL 2 TIMES DAILY
Qty: 60 TABLET | Refills: 1 | Status: SHIPPED | OUTPATIENT
Start: 2019-01-01 | End: 2019-01-01

## 2019-01-01 RX ORDER — MAGNESIUM SULFATE HEPTAHYDRATE 40 MG/ML
2 INJECTION, SOLUTION INTRAVENOUS
Status: ACTIVE | OUTPATIENT
Start: 2019-01-01 | End: 2019-01-01

## 2019-01-01 RX ORDER — LEVALBUTEROL 1.25 MG/.5ML
1.25 SOLUTION, CONCENTRATE RESPIRATORY (INHALATION) 3 TIMES DAILY
Status: DISCONTINUED | OUTPATIENT
Start: 2019-01-01 | End: 2019-01-01

## 2019-01-01 RX ORDER — ONDANSETRON 4 MG/1
8 TABLET, FILM COATED ORAL EVERY 6 HOURS PRN
Status: DISCONTINUED | OUTPATIENT
Start: 2019-01-01 | End: 2019-01-01

## 2019-01-01 RX ORDER — VANCOMYCIN HYDROCHLORIDE 750 MG/15ML
750 INJECTION, POWDER, LYOPHILIZED, FOR SOLUTION INTRAVENOUS
COMMUNITY
Start: 2019-01-01 | End: 2019-01-01 | Stop reason: CLARIF

## 2019-01-01 RX ORDER — HEPARIN SODIUM 5000 [USP'U]/ML
5000 INJECTION, SOLUTION INTRAVENOUS; SUBCUTANEOUS EVERY 8 HOURS
Status: CANCELLED | OUTPATIENT
Start: 2019-01-01

## 2019-01-01 RX ORDER — OLANZAPINE 10 MG/2ML
10 INJECTION, POWDER, FOR SOLUTION INTRAMUSCULAR 2 TIMES DAILY PRN
Status: DISCONTINUED | OUTPATIENT
Start: 2019-01-01 | End: 2019-01-01

## 2019-01-01 RX ORDER — SODIUM CHLORIDE 9 MG/ML
500 INJECTION, SOLUTION INTRAVENOUS
Status: COMPLETED | OUTPATIENT
Start: 2019-01-01 | End: 2019-01-01

## 2019-01-01 RX ORDER — PREDNISONE 5 MG/1
15 TABLET ORAL DAILY
Qty: 90 TABLET | Refills: 11 | Status: SHIPPED | OUTPATIENT
Start: 2019-01-01

## 2019-01-01 RX ORDER — MORPHINE SULFATE 15 MG/1
15 TABLET ORAL EVERY 8 HOURS
Status: DISCONTINUED | OUTPATIENT
Start: 2019-01-01 | End: 2019-01-01

## 2019-01-01 RX ORDER — POLYETHYLENE GLYCOL 3350, SODIUM SULFATE ANHYDROUS, SODIUM BICARBONATE, SODIUM CHLORIDE, POTASSIUM CHLORIDE 236; 22.74; 6.74; 5.86; 2.97 G/4L; G/4L; G/4L; G/4L; G/4L
4 POWDER, FOR SOLUTION ORAL ONCE
Qty: 4000 ML | Refills: 0 | Status: SHIPPED | OUTPATIENT
Start: 2019-01-01 | End: 2019-01-01

## 2019-01-01 RX ORDER — PROPOFOL 10 MG/ML
15 INJECTION, EMULSION INTRAVENOUS CONTINUOUS
Status: DISCONTINUED | OUTPATIENT
Start: 2019-01-01 | End: 2019-01-01

## 2019-01-01 RX ORDER — VANCOMYCIN HCL IN 5 % DEXTROSE 1G/250ML
1000 PLASTIC BAG, INJECTION (ML) INTRAVENOUS
Status: COMPLETED | OUTPATIENT
Start: 2019-01-01 | End: 2019-01-01

## 2019-01-01 RX ORDER — VENLAFAXINE HYDROCHLORIDE 37.5 MG/1
37.5 CAPSULE, EXTENDED RELEASE ORAL DAILY
Status: DISCONTINUED | OUTPATIENT
Start: 2019-01-01 | End: 2019-01-01

## 2019-01-01 RX ORDER — LANOLIN ALCOHOL/MO/W.PET/CERES
400 CREAM (GRAM) TOPICAL 3 TIMES DAILY
Status: DISCONTINUED | OUTPATIENT
Start: 2019-01-01 | End: 2019-01-01 | Stop reason: HOSPADM

## 2019-01-01 RX ORDER — SODIUM CHLORIDE FOR INHALATION 3 %
4 VIAL, NEBULIZER (ML) INHALATION DAILY
Qty: 120 ML | Refills: 6 | Status: ON HOLD | OUTPATIENT
Start: 2019-01-01 | End: 2019-01-01 | Stop reason: HOSPADM

## 2019-01-01 RX ORDER — LORAZEPAM 0.5 MG/1
1 TABLET ORAL EVERY 8 HOURS PRN
Status: DISCONTINUED | OUTPATIENT
Start: 2019-01-01 | End: 2019-01-01

## 2019-01-01 RX ORDER — LANOLIN ALCOHOL/MO/W.PET/CERES
400 CREAM (GRAM) TOPICAL 2 TIMES DAILY
Status: DISCONTINUED | OUTPATIENT
Start: 2019-01-01 | End: 2019-01-01 | Stop reason: HOSPADM

## 2019-01-01 RX ORDER — DIPHENHYDRAMINE HYDROCHLORIDE 50 MG/ML
50 INJECTION INTRAMUSCULAR; INTRAVENOUS 2 TIMES DAILY
Status: DISCONTINUED | OUTPATIENT
Start: 2019-01-01 | End: 2019-01-01 | Stop reason: HOSPADM

## 2019-01-01 RX ORDER — ACETAMINOPHEN 10 MG/ML
INJECTION, SOLUTION INTRAVENOUS
Status: DISCONTINUED | OUTPATIENT
Start: 2019-01-01 | End: 2019-01-01

## 2019-01-01 RX ORDER — DIPHENHYDRAMINE HCL 12.5MG/5ML
25 ELIXIR ORAL DAILY PRN
Status: DISCONTINUED | OUTPATIENT
Start: 2019-01-01 | End: 2019-01-01

## 2019-01-01 RX ORDER — EPINEPHRINE 0.1 MG/ML
INJECTION INTRAVENOUS CODE/TRAUMA/SEDATION MEDICATION
Status: COMPLETED | OUTPATIENT
Start: 2019-01-01 | End: 2019-01-01

## 2019-01-01 RX ORDER — PREDNISONE 20 MG/1
20 TABLET ORAL DAILY
Status: DISCONTINUED | OUTPATIENT
Start: 2019-01-01 | End: 2019-01-01

## 2019-01-01 RX ORDER — HEPARIN SODIUM 10000 [USP'U]/100ML
300 INJECTION, SOLUTION INTRAVENOUS CONTINUOUS
Status: DISCONTINUED | OUTPATIENT
Start: 2019-01-01 | End: 2019-01-01

## 2019-01-01 RX ORDER — ROPIVACAINE HYDROCHLORIDE 2 MG/ML
INJECTION, SOLUTION EPIDURAL; INFILTRATION; PERINEURAL
Status: DISPENSED
Start: 2019-01-01 | End: 2019-01-01

## 2019-01-01 RX ORDER — FLUCONAZOLE 10 MG/ML
400 POWDER, FOR SUSPENSION ORAL DAILY
Status: DISCONTINUED | OUTPATIENT
Start: 2019-01-01 | End: 2019-01-01

## 2019-01-01 RX ORDER — LEVALBUTEROL 1.25 MG/.5ML
1.25 SOLUTION, CONCENTRATE RESPIRATORY (INHALATION) EVERY 6 HOURS PRN
Status: DISCONTINUED | OUTPATIENT
Start: 2019-01-01 | End: 2019-01-01 | Stop reason: HOSPADM

## 2019-01-01 RX ORDER — GLYCOPYRROLATE 0.2 MG/ML
INJECTION INTRAMUSCULAR; INTRAVENOUS
Status: DISCONTINUED | OUTPATIENT
Start: 2019-01-01 | End: 2019-01-01

## 2019-01-01 RX ORDER — LEVALBUTEROL INHALATION SOLUTION 1.25 MG/3ML
1 SOLUTION RESPIRATORY (INHALATION) EVERY 8 HOURS PRN
Qty: 90 ML | Refills: 5 | Status: SHIPPED | OUTPATIENT
Start: 2019-01-01 | End: 2020-08-02

## 2019-01-01 RX ORDER — INSULIN ASPART 100 [IU]/ML
0-5 INJECTION, SOLUTION INTRAVENOUS; SUBCUTANEOUS
Status: DISCONTINUED | OUTPATIENT
Start: 2019-01-01 | End: 2019-01-01 | Stop reason: HOSPADM

## 2019-01-01 RX ORDER — VANCOMYCIN HCL IN 5 % DEXTROSE 1G/250ML
1000 PLASTIC BAG, INJECTION (ML) INTRAVENOUS
Status: DISCONTINUED | OUTPATIENT
Start: 2019-01-01 | End: 2019-01-01

## 2019-01-01 RX ORDER — OFLOXACIN 3 MG/ML
5 SOLUTION AURICULAR (OTIC) DAILY
Qty: 10 ML | Refills: 0 | Status: SHIPPED | OUTPATIENT
Start: 2019-01-01 | End: 2019-01-01

## 2019-01-01 RX ORDER — ONDANSETRON 4 MG/1
8 TABLET, FILM COATED ORAL EVERY 12 HOURS PRN
Status: DISCONTINUED | OUTPATIENT
Start: 2019-01-01 | End: 2019-01-01 | Stop reason: HOSPADM

## 2019-01-01 RX ORDER — HEPARIN SODIUM 1000 [USP'U]/ML
INJECTION, SOLUTION INTRAVENOUS; SUBCUTANEOUS
Status: DISCONTINUED | OUTPATIENT
Start: 2019-01-01 | End: 2019-01-01

## 2019-01-01 RX ORDER — INDOMETHACIN 25 MG/1
150 CAPSULE ORAL ONCE
Status: COMPLETED | OUTPATIENT
Start: 2019-01-01 | End: 2019-01-01

## 2019-01-01 RX ORDER — FLUTICASONE FUROATE AND VILANTEROL 100; 25 UG/1; UG/1
1 POWDER RESPIRATORY (INHALATION) DAILY
Status: DISCONTINUED | OUTPATIENT
Start: 2019-01-01 | End: 2019-01-01

## 2019-01-01 RX ORDER — VORICONAZOLE 200 MG/1
200 TABLET, FILM COATED ORAL 2 TIMES DAILY
Status: DISCONTINUED | OUTPATIENT
Start: 2019-01-01 | End: 2019-01-01

## 2019-01-01 RX ORDER — BUPIVACAINE HYDROCHLORIDE 2.5 MG/ML
INJECTION, SOLUTION EPIDURAL; INFILTRATION; INTRACAUDAL
Status: COMPLETED
Start: 2019-01-01 | End: 2019-01-01

## 2019-01-01 RX ORDER — INDOMETHACIN 25 MG/1
CAPSULE ORAL
Status: DISPENSED
Start: 2019-01-01 | End: 2019-01-01

## 2019-01-01 RX ORDER — ONDANSETRON 2 MG/ML
4 INJECTION INTRAMUSCULAR; INTRAVENOUS EVERY 6 HOURS PRN
Status: DISCONTINUED | OUTPATIENT
Start: 2019-01-01 | End: 2019-01-01

## 2019-01-01 RX ORDER — LACTULOSE 10 G/15ML
20 SOLUTION ORAL 3 TIMES DAILY
Status: DISCONTINUED | OUTPATIENT
Start: 2019-01-01 | End: 2019-01-01

## 2019-01-01 RX ORDER — MUPIROCIN 20 MG/G
1 OINTMENT TOPICAL 2 TIMES DAILY
Status: DISCONTINUED | OUTPATIENT
Start: 2019-01-01 | End: 2019-01-01

## 2019-01-01 RX ORDER — GABAPENTIN 300 MG/1
300 CAPSULE ORAL 3 TIMES DAILY
Qty: 90 CAPSULE | Refills: 11 | Status: ON HOLD | OUTPATIENT
Start: 2019-01-01 | End: 2019-01-01 | Stop reason: SDUPTHER

## 2019-01-01 RX ORDER — DEXAMETHASONE SODIUM PHOSPHATE 10 MG/ML
INJECTION INTRAMUSCULAR; INTRAVENOUS
Status: DISPENSED
Start: 2019-01-01 | End: 2019-01-01

## 2019-01-01 RX ORDER — VANCOMYCIN HCL IN 5 % DEXTROSE 1.25 G/25
20 PLASTIC BAG, INJECTION (ML) INTRAVENOUS ONCE
Status: COMPLETED | OUTPATIENT
Start: 2019-01-01 | End: 2019-01-01

## 2019-01-01 RX ORDER — INSULIN ASPART 100 [IU]/ML
4 INJECTION, SOLUTION INTRAVENOUS; SUBCUTANEOUS
Status: DISCONTINUED | OUTPATIENT
Start: 2019-01-01 | End: 2019-01-01

## 2019-01-01 RX ORDER — OMEPRAZOLE 40 MG/1
40 CAPSULE, DELAYED RELEASE ORAL DAILY
Qty: 30 CAPSULE | Refills: 11 | Status: SHIPPED | OUTPATIENT
Start: 2019-01-01

## 2019-01-01 RX ORDER — PREDNISONE 10 MG/1
70 TABLET ORAL ONCE
Status: DISCONTINUED | OUTPATIENT
Start: 2019-01-01 | End: 2019-01-01

## 2019-01-01 RX ORDER — MUPIROCIN 20 MG/G
1 OINTMENT TOPICAL 2 TIMES DAILY
Status: COMPLETED | OUTPATIENT
Start: 2019-01-01 | End: 2019-01-01

## 2019-01-01 RX ORDER — MEROPENEM AND SODIUM CHLORIDE 1 G/50ML
1 INJECTION, SOLUTION INTRAVENOUS
Status: DISCONTINUED | OUTPATIENT
Start: 2019-01-01 | End: 2019-01-01 | Stop reason: HOSPADM

## 2019-01-01 RX ORDER — HYDROMORPHONE HYDROCHLORIDE 1 MG/ML
2 INJECTION, SOLUTION INTRAMUSCULAR; INTRAVENOUS; SUBCUTANEOUS
Status: DISCONTINUED | OUTPATIENT
Start: 2019-01-01 | End: 2019-01-01

## 2019-01-01 RX ORDER — TOBRAMYCIN 300 MG/5ML
300 SOLUTION RESPIRATORY (INHALATION) EVERY 12 HOURS
Qty: 300 ML | Refills: 6 | Status: ON HOLD | OUTPATIENT
Start: 2019-01-01 | End: 2019-01-01 | Stop reason: HOSPADM

## 2019-01-01 RX ORDER — ETOMIDATE 2 MG/ML
20 INJECTION INTRAVENOUS ONCE
Status: COMPLETED | OUTPATIENT
Start: 2019-01-01 | End: 2019-01-01

## 2019-01-01 RX ORDER — CLONIDINE 100 UG/ML
INJECTION, SOLUTION EPIDURAL
Status: COMPLETED
Start: 2019-01-01 | End: 2019-01-01

## 2019-01-01 RX ORDER — OXYCODONE HYDROCHLORIDE 10 MG/1
10 TABLET ORAL ONCE
Status: COMPLETED | OUTPATIENT
Start: 2019-01-01 | End: 2019-01-01

## 2019-01-01 RX ORDER — OXYCODONE HYDROCHLORIDE 10 MG/1
10 TABLET ORAL ONCE AS NEEDED
Status: COMPLETED | OUTPATIENT
Start: 2019-01-01 | End: 2019-01-01

## 2019-01-01 RX ORDER — ALOGLIPTIN 25 MG/1
25 TABLET, FILM COATED ORAL DAILY
Qty: 30 TABLET | Refills: 11 | Status: SHIPPED | OUTPATIENT
Start: 2019-01-01 | End: 2019-01-01 | Stop reason: HOSPADM

## 2019-01-01 RX ORDER — METOCLOPRAMIDE HYDROCHLORIDE 5 MG/ML
5 INJECTION INTRAMUSCULAR; INTRAVENOUS EVERY 6 HOURS PRN
Status: DISCONTINUED | OUTPATIENT
Start: 2019-01-01 | End: 2019-01-01

## 2019-01-01 RX ORDER — OXYCODONE HYDROCHLORIDE 5 MG/1
5 TABLET ORAL EVERY 4 HOURS PRN
Status: DISCONTINUED | OUTPATIENT
Start: 2019-01-01 | End: 2019-01-01 | Stop reason: HOSPADM

## 2019-01-01 RX ORDER — NICARDIPINE HYDROCHLORIDE 0.2 MG/ML
INJECTION INTRAVENOUS
Status: COMPLETED
Start: 2019-01-01 | End: 2019-01-01

## 2019-01-01 RX ORDER — FUROSEMIDE 10 MG/ML
40 INJECTION INTRAMUSCULAR; INTRAVENOUS EVERY 8 HOURS
Status: COMPLETED | OUTPATIENT
Start: 2019-01-01 | End: 2019-01-01

## 2019-01-01 RX ORDER — OXYCODONE HYDROCHLORIDE 5 MG/1
5 TABLET ORAL EVERY 4 HOURS PRN
Status: ACTIVE | OUTPATIENT
Start: 2019-01-01 | End: 2019-01-01

## 2019-01-01 RX ORDER — ONDANSETRON HYDROCHLORIDE 8 MG/1
8 TABLET, FILM COATED ORAL EVERY 12 HOURS PRN
Qty: 30 TABLET | Refills: 2 | Status: ON HOLD | OUTPATIENT
Start: 2019-01-01 | End: 2019-01-01 | Stop reason: SDUPTHER

## 2019-01-01 RX ORDER — FENTANYL CITRATE 50 UG/ML
25 INJECTION, SOLUTION INTRAMUSCULAR; INTRAVENOUS EVERY 5 MIN PRN
Status: DISCONTINUED | OUTPATIENT
Start: 2019-01-01 | End: 2019-01-01 | Stop reason: HOSPADM

## 2019-01-01 RX ORDER — FENTANYL CITRATE 50 UG/ML
INJECTION, SOLUTION INTRAMUSCULAR; INTRAVENOUS
Status: DISCONTINUED
Start: 2019-01-01 | End: 2019-01-01 | Stop reason: WASHOUT

## 2019-01-01 RX ORDER — INSULIN ASPART 100 [IU]/ML
3 INJECTION, SOLUTION INTRAVENOUS; SUBCUTANEOUS
Status: DISCONTINUED | OUTPATIENT
Start: 2019-01-01 | End: 2019-01-01

## 2019-01-01 RX ORDER — PSEUDOEPHEDRINE/ACETAMINOPHEN 30MG-500MG
100 TABLET ORAL ONCE
Status: COMPLETED | OUTPATIENT
Start: 2019-01-01 | End: 2019-01-01

## 2019-01-01 RX ORDER — PREDNISONE 5 MG/1
15 TABLET ORAL DAILY
Qty: 90 TABLET | Refills: 11
Start: 2019-01-01 | End: 2019-01-01 | Stop reason: SDUPTHER

## 2019-01-01 RX ORDER — ALBUMIN HUMAN 50 G/1000ML
SOLUTION INTRAVENOUS
Status: COMPLETED
Start: 2019-01-01 | End: 2019-01-01

## 2019-01-01 RX ADMIN — CISATRACURIUM BESYLATE 2 MCG/KG/MIN: 10 INJECTION INTRAVENOUS at 03:08

## 2019-01-01 RX ADMIN — CIPROFLOXACIN 400 MG: 2 INJECTION, SOLUTION INTRAVENOUS at 01:06

## 2019-01-01 RX ADMIN — MONTELUKAST 10 MG: 10 TABLET, FILM COATED ORAL at 08:07

## 2019-01-01 RX ADMIN — ROCURONIUM BROMIDE 30 MG: 10 INJECTION, SOLUTION INTRAVENOUS at 12:06

## 2019-01-01 RX ADMIN — Medication 10 ML: at 06:06

## 2019-01-01 RX ADMIN — VANCOMYCIN HYDROCHLORIDE 1250 MG: 1.25 INJECTION, POWDER, LYOPHILIZED, FOR SOLUTION INTRAVENOUS at 05:06

## 2019-01-01 RX ADMIN — PROPOFOL 50 MCG/KG/MIN: 10 INJECTION, EMULSION INTRAVENOUS at 04:06

## 2019-01-01 RX ADMIN — MAGNESIUM OXIDE TAB 400 MG (241.3 MG ELEMENTAL MG) 400 MG: 400 (241.3 MG) TAB at 08:07

## 2019-01-01 RX ADMIN — CIPROFLOXACIN 400 MG: 2 INJECTION, SOLUTION INTRAVENOUS at 09:07

## 2019-01-01 RX ADMIN — MORPHINE SULFATE 15 MG: 15 TABLET, EXTENDED RELEASE ORAL at 09:04

## 2019-01-01 RX ADMIN — MAGNESIUM SULFATE IN WATER 2 G: 40 INJECTION, SOLUTION INTRAVENOUS at 06:06

## 2019-01-01 RX ADMIN — ONDANSETRON 8 MG: 2 INJECTION INTRAMUSCULAR; INTRAVENOUS at 09:06

## 2019-01-01 RX ADMIN — FENTANYL CITRATE 100 MCG: 50 INJECTION INTRAMUSCULAR; INTRAVENOUS at 02:08

## 2019-01-01 RX ADMIN — MICAFUNGIN SODIUM 100 MG: 20 INJECTION, POWDER, LYOPHILIZED, FOR SOLUTION INTRAVENOUS at 11:09

## 2019-01-01 RX ADMIN — CEFEPIME 1 G: 1 INJECTION, POWDER, FOR SOLUTION INTRAMUSCULAR; INTRAVENOUS at 12:07

## 2019-01-01 RX ADMIN — DAPSONE 100 MG: 100 TABLET ORAL at 09:07

## 2019-01-01 RX ADMIN — OXYCODONE HYDROCHLORIDE 10 MG: 10 TABLET ORAL at 01:04

## 2019-01-01 RX ADMIN — QUETIAPINE FUMARATE 50 MG: 25 TABLET ORAL at 09:07

## 2019-01-01 RX ADMIN — OXYCODONE HYDROCHLORIDE 10 MG: 10 TABLET ORAL at 02:07

## 2019-01-01 RX ADMIN — LEVALBUTEROL 1.25 MG: 1.25 SOLUTION, CONCENTRATE RESPIRATORY (INHALATION) at 09:07

## 2019-01-01 RX ADMIN — SODIUM CHLORIDE: 0.9 INJECTION, SOLUTION INTRAVENOUS at 09:09

## 2019-01-01 RX ADMIN — OXYCODONE HYDROCHLORIDE 10 MG: 10 TABLET ORAL at 05:04

## 2019-01-01 RX ADMIN — HYDROCORTISONE: 10 CREAM TOPICAL at 01:04

## 2019-01-01 RX ADMIN — PANCRELIPASE 1 TABLET: 20880; 78300; 78300 TABLET ORAL at 05:06

## 2019-01-01 RX ADMIN — PROPOFOL 50 MG: 10 INJECTION, EMULSION INTRAVENOUS at 11:05

## 2019-01-01 RX ADMIN — DIPHENHYDRAMINE HYDROCHLORIDE 50 MG: 50 INJECTION, SOLUTION INTRAMUSCULAR; INTRAVENOUS at 03:06

## 2019-01-01 RX ADMIN — METOPROLOL TARTRATE 25 MG: 25 TABLET, FILM COATED ORAL at 02:07

## 2019-01-01 RX ADMIN — TACROLIMUS 0.5 MG: 0.5 CAPSULE ORAL at 06:07

## 2019-01-01 RX ADMIN — ONDANSETRON 8 MG: 2 INJECTION INTRAMUSCULAR; INTRAVENOUS at 08:07

## 2019-01-01 RX ADMIN — MYCOPHENOLATE MOFETIL 500 MG: 500 INJECTION, POWDER, LYOPHILIZED, FOR SOLUTION INTRAVENOUS at 09:06

## 2019-01-01 RX ADMIN — TACROLIMUS 1 MG: 1 CAPSULE ORAL at 08:07

## 2019-01-01 RX ADMIN — PREDNISONE 20 MG: 10 TABLET ORAL at 08:07

## 2019-01-01 RX ADMIN — ENOXAPARIN SODIUM 40 MG: 100 INJECTION SUBCUTANEOUS at 05:07

## 2019-01-01 RX ADMIN — HYDROMORPHONE HYDROCHLORIDE 2 MG: 2 INJECTION INTRAMUSCULAR; INTRAVENOUS; SUBCUTANEOUS at 12:06

## 2019-01-01 RX ADMIN — GANCICLOVIR SODIUM 295 MG: 500 INJECTION, POWDER, LYOPHILIZED, FOR SOLUTION INTRAVENOUS at 12:07

## 2019-01-01 RX ADMIN — GANCICLOVIR SODIUM 295 MG: 500 INJECTION, POWDER, LYOPHILIZED, FOR SOLUTION INTRAVENOUS at 08:06

## 2019-01-01 RX ADMIN — CETIRIZINE HYDROCHLORIDE 10 MG: 10 TABLET, FILM COATED ORAL at 11:07

## 2019-01-01 RX ADMIN — VASOPRESSIN 0.04 UNITS/MIN: 20 INJECTION INTRAVENOUS at 10:09

## 2019-01-01 RX ADMIN — VALGANCICLOVIR 450 MG: 450 TABLET, FILM COATED ORAL at 09:07

## 2019-01-01 RX ADMIN — ONDANSETRON 8 MG: 2 INJECTION INTRAMUSCULAR; INTRAVENOUS at 03:07

## 2019-01-01 RX ADMIN — CEFTOLOZANE AND TAZOBACTAM 1500 MG: 1; .5 INJECTION, POWDER, LYOPHILIZED, FOR SOLUTION INTRAVENOUS at 01:07

## 2019-01-01 RX ADMIN — QUETIAPINE FUMARATE 50 MG: 25 TABLET ORAL at 10:04

## 2019-01-01 RX ADMIN — PREDNISONE 20 MG: 10 TABLET ORAL at 09:07

## 2019-01-01 RX ADMIN — FUROSEMIDE 80 MG: 10 INJECTION, SOLUTION INTRAMUSCULAR; INTRAVENOUS at 04:08

## 2019-01-01 RX ADMIN — METOPROLOL TARTRATE 25 MG: 25 TABLET, FILM COATED ORAL at 09:07

## 2019-01-01 RX ADMIN — Medication 1 MCG/KG/MIN: at 08:08

## 2019-01-01 RX ADMIN — VALGANCICLOVIR 450 MG: 450 TABLET, FILM COATED ORAL at 08:07

## 2019-01-01 RX ADMIN — OXYCODONE HYDROCHLORIDE 10 MG: 10 TABLET ORAL at 07:07

## 2019-01-01 RX ADMIN — INSULIN ASPART 2 UNITS: 100 INJECTION, SOLUTION INTRAVENOUS; SUBCUTANEOUS at 12:06

## 2019-01-01 RX ADMIN — TOPIRAMATE 25 MG: 25 TABLET, FILM COATED ORAL at 08:04

## 2019-01-01 RX ADMIN — ENOXAPARIN SODIUM 40 MG: 100 INJECTION SUBCUTANEOUS at 05:06

## 2019-01-01 RX ADMIN — Medication 0.2 MCG/KG/MIN: at 11:08

## 2019-01-01 RX ADMIN — TACROLIMUS 2 MG: 1 CAPSULE ORAL at 05:06

## 2019-01-01 RX ADMIN — PANCRELIPASE 4 CAPSULE: 24000; 76000; 120000 CAPSULE, DELAYED RELEASE PELLETS ORAL at 05:07

## 2019-01-01 RX ADMIN — PROPOFOL 50 MCG/KG/MIN: 10 INJECTION, EMULSION INTRAVENOUS at 04:08

## 2019-01-01 RX ADMIN — MORPHINE SULFATE 15 MG: 15 TABLET, EXTENDED RELEASE ORAL at 07:07

## 2019-01-01 RX ADMIN — MULTIPLE VITAMINS W/ MINERALS TAB 1 TABLET: TAB at 08:04

## 2019-01-01 RX ADMIN — LEVALBUTEROL 1.25 MG: 1.25 SOLUTION, CONCENTRATE RESPIRATORY (INHALATION) at 10:07

## 2019-01-01 RX ADMIN — MEROPENEM AND SODIUM CHLORIDE 1 G: 1 INJECTION, SOLUTION INTRAVENOUS at 10:04

## 2019-01-01 RX ADMIN — GANCICLOVIR SODIUM 295 MG: 500 INJECTION, POWDER, LYOPHILIZED, FOR SOLUTION INTRAVENOUS at 10:06

## 2019-01-01 RX ADMIN — ONDANSETRON 8 MG: 2 INJECTION INTRAMUSCULAR; INTRAVENOUS at 02:07

## 2019-01-01 RX ADMIN — VASOPRESSIN 0.04 UNITS/MIN: 20 INJECTION INTRAVENOUS at 07:08

## 2019-01-01 RX ADMIN — VASOPRESSIN 0.04 UNITS/MIN: 20 INJECTION INTRAVENOUS at 06:08

## 2019-01-01 RX ADMIN — MUPIROCIN 1 G: 20 OINTMENT TOPICAL at 08:06

## 2019-01-01 RX ADMIN — ONDANSETRON 8 MG: 2 INJECTION INTRAMUSCULAR; INTRAVENOUS at 12:07

## 2019-01-01 RX ADMIN — OXYCODONE HYDROCHLORIDE 10 MG: 10 TABLET ORAL at 03:04

## 2019-01-01 RX ADMIN — CETIRIZINE HYDROCHLORIDE 10 MG: 10 TABLET, FILM COATED ORAL at 10:07

## 2019-01-01 RX ADMIN — DIPHENHYDRAMINE HYDROCHLORIDE 50 MG: 50 INJECTION, SOLUTION INTRAMUSCULAR; INTRAVENOUS at 09:04

## 2019-01-01 RX ADMIN — LORAZEPAM 2 MG: 2 INJECTION INTRAMUSCULAR; INTRAVENOUS at 08:07

## 2019-01-01 RX ADMIN — MEROPENEM AND SODIUM CHLORIDE 1 G: 1 INJECTION, SOLUTION INTRAVENOUS at 02:04

## 2019-01-01 RX ADMIN — OMEPRAZOLE 40 MG: 40 CAPSULE, DELAYED RELEASE ORAL at 08:04

## 2019-01-01 RX ADMIN — OMEPRAZOLE 40 MG: 40 CAPSULE, DELAYED RELEASE ORAL at 10:04

## 2019-01-01 RX ADMIN — MEROPENEM AND SODIUM CHLORIDE 1 G: 1 INJECTION, SOLUTION INTRAVENOUS at 01:04

## 2019-01-01 RX ADMIN — INSULIN ASPART 4 UNITS: 100 INJECTION, SOLUTION INTRAVENOUS; SUBCUTANEOUS at 10:08

## 2019-01-01 RX ADMIN — CALCIUM GLUCONATE 2 G: 98 INJECTION, SOLUTION INTRAVENOUS at 05:09

## 2019-01-01 RX ADMIN — TOPIRAMATE 25 MG: 25 TABLET, FILM COATED ORAL at 10:04

## 2019-01-01 RX ADMIN — ACETAMINOPHEN 1000 MG: 10 INJECTION, SOLUTION INTRAVENOUS at 05:06

## 2019-01-01 RX ADMIN — HYDROMORPHONE HYDROCHLORIDE 1 MG: 1 INJECTION, SOLUTION INTRAMUSCULAR; INTRAVENOUS; SUBCUTANEOUS at 11:06

## 2019-01-01 RX ADMIN — CEFTOLOZANE AND TAZOBACTAM 1500 MG: 1; .5 INJECTION, POWDER, LYOPHILIZED, FOR SOLUTION INTRAVENOUS at 03:07

## 2019-01-01 RX ADMIN — MAGNESIUM OXIDE TAB 400 MG (241.3 MG ELEMENTAL MG) 400 MG: 400 (241.3 MG) TAB at 08:08

## 2019-01-01 RX ADMIN — FAMOTIDINE 20 MG: 10 INJECTION INTRAVENOUS at 10:06

## 2019-01-01 RX ADMIN — NOREPINEPHRINE BITARTRATE 2.4 MCG/KG/MIN: 1 INJECTION, SOLUTION, CONCENTRATE INTRAVENOUS at 12:08

## 2019-01-01 RX ADMIN — ONDANSETRON 4 MG: 2 INJECTION INTRAMUSCULAR; INTRAVENOUS at 11:06

## 2019-01-01 RX ADMIN — CEFEPIME 2 G: 2 INJECTION, POWDER, FOR SOLUTION INTRAVENOUS at 08:04

## 2019-01-01 RX ADMIN — GABAPENTIN 300 MG: 300 CAPSULE ORAL at 02:07

## 2019-01-01 RX ADMIN — DIPHENHYDRAMINE HYDROCHLORIDE 50 MG: 50 INJECTION, SOLUTION INTRAMUSCULAR; INTRAVENOUS at 05:08

## 2019-01-01 RX ADMIN — LORAZEPAM 2 MG: 2 INJECTION INTRAMUSCULAR; INTRAVENOUS at 07:06

## 2019-01-01 RX ADMIN — QUETIAPINE FUMARATE 25 MG: 25 TABLET ORAL at 08:04

## 2019-01-01 RX ADMIN — LEVALBUTEROL HYDROCHLORIDE 1.25 MG: 1.25 SOLUTION, CONCENTRATE RESPIRATORY (INHALATION) at 04:09

## 2019-01-01 RX ADMIN — QUETIAPINE FUMARATE 50 MG: 25 TABLET ORAL at 08:07

## 2019-01-01 RX ADMIN — METOPROLOL TARTRATE 25 MG: 25 TABLET, FILM COATED ORAL at 03:07

## 2019-01-01 RX ADMIN — METHYLPREDNISOLONE SODIUM SUCCINATE 125 MG: 125 INJECTION, POWDER, FOR SOLUTION INTRAMUSCULAR; INTRAVENOUS at 08:08

## 2019-01-01 RX ADMIN — LEVALBUTEROL 1.25 MG: 1.25 SOLUTION, CONCENTRATE RESPIRATORY (INHALATION) at 11:06

## 2019-01-01 RX ADMIN — METOPROLOL TARTRATE 10 MG: 5 INJECTION INTRAVENOUS at 09:08

## 2019-01-01 RX ADMIN — PREDNISONE 70 MG: 10 TABLET ORAL at 09:06

## 2019-01-01 RX ADMIN — CEFTOLOZANE AND TAZOBACTAM 1500 MG: 1; .5 INJECTION, POWDER, LYOPHILIZED, FOR SOLUTION INTRAVENOUS at 04:08

## 2019-01-01 RX ADMIN — Medication 1 SPRAY: at 09:04

## 2019-01-01 RX ADMIN — TACROLIMUS 0.5 MG: 0.5 CAPSULE ORAL at 05:07

## 2019-01-01 RX ADMIN — DIPHENHYDRAMINE HYDROCHLORIDE 50 MG: 50 INJECTION, SOLUTION INTRAMUSCULAR; INTRAVENOUS at 09:07

## 2019-01-01 RX ADMIN — Medication 10 ML: at 12:06

## 2019-01-01 RX ADMIN — INSULIN ASPART 2 UNITS: 100 INJECTION, SOLUTION INTRAVENOUS; SUBCUTANEOUS at 04:06

## 2019-01-01 RX ADMIN — VASOPRESSIN 0.04 UNITS/MIN: 20 INJECTION INTRAVENOUS at 11:08

## 2019-01-01 RX ADMIN — LEVALBUTEROL HYDROCHLORIDE 1.25 MG: 1.25 SOLUTION, CONCENTRATE RESPIRATORY (INHALATION) at 11:08

## 2019-01-01 RX ADMIN — INSULIN ASPART 2 UNITS: 100 INJECTION, SOLUTION INTRAVENOUS; SUBCUTANEOUS at 04:07

## 2019-01-01 RX ADMIN — DIPHENHYDRAMINE HYDROCHLORIDE 25 MG: 25 CAPSULE ORAL at 12:04

## 2019-01-01 RX ADMIN — METOPROLOL TARTRATE 25 MG: 25 TABLET, FILM COATED ORAL at 10:07

## 2019-01-01 RX ADMIN — CEFEPIME 500 MG: 1 INJECTION, POWDER, FOR SOLUTION INTRAMUSCULAR; INTRAVENOUS at 08:07

## 2019-01-01 RX ADMIN — DIPHENHYDRAMINE HYDROCHLORIDE 50 MG: 50 INJECTION, SOLUTION INTRAMUSCULAR; INTRAVENOUS at 08:07

## 2019-01-01 RX ADMIN — PHENYLEPHRINE HYDROCHLORIDE 4 MCG/KG/MIN: 10 INJECTION INTRAVENOUS at 09:09

## 2019-01-01 RX ADMIN — KETOROLAC TROMETHAMINE 15 MG: 15 INJECTION, SOLUTION INTRAMUSCULAR; INTRAVENOUS at 11:08

## 2019-01-01 RX ADMIN — SODIUM CHLORIDE 10 MCG/KG/MIN: 0.9 INJECTION, SOLUTION INTRAVENOUS at 09:06

## 2019-01-01 RX ADMIN — MULTIPLE VITAMINS W/ MINERALS TAB 1 TABLET: TAB at 09:04

## 2019-01-01 RX ADMIN — CHLORHEXIDINE GLUCONATE 0.12% ORAL RINSE 10 ML: 1.2 LIQUID ORAL at 08:06

## 2019-01-01 RX ADMIN — ENOXAPARIN SODIUM 40 MG: 100 INJECTION SUBCUTANEOUS at 05:08

## 2019-01-01 RX ADMIN — MYCOPHENOLATE MOFETIL 250 MG: 250 CAPSULE ORAL at 08:07

## 2019-01-01 RX ADMIN — ROPIVACAINE HYDROCHLORIDE 2 ML/HR: 2 INJECTION, SOLUTION EPIDURAL; INFILTRATION at 03:06

## 2019-01-01 RX ADMIN — FUROSEMIDE 40 MG: 10 INJECTION, SOLUTION INTRAVENOUS at 10:06

## 2019-01-01 RX ADMIN — OXYCODONE HYDROCHLORIDE 10 MG: 10 TABLET ORAL at 07:04

## 2019-01-01 RX ADMIN — DIPHENHYDRAMINE HYDROCHLORIDE 50 MG: 50 INJECTION, SOLUTION INTRAMUSCULAR; INTRAVENOUS at 04:06

## 2019-01-01 RX ADMIN — HYDROMORPHONE HYDROCHLORIDE 1 MG: 1 INJECTION, SOLUTION INTRAMUSCULAR; INTRAVENOUS; SUBCUTANEOUS at 05:07

## 2019-01-01 RX ADMIN — PANCRELIPASE 4 CAPSULE: 24000; 76000; 120000 CAPSULE, DELAYED RELEASE PELLETS ORAL at 07:07

## 2019-01-01 RX ADMIN — CHLORHEXIDINE GLUCONATE 0.12% ORAL RINSE 15 ML: 1.2 LIQUID ORAL at 12:08

## 2019-01-01 RX ADMIN — Medication 1 MG: at 07:06

## 2019-01-01 RX ADMIN — MEROPENEM AND SODIUM CHLORIDE 1 G: 1 INJECTION, SOLUTION INTRAVENOUS at 08:04

## 2019-01-01 RX ADMIN — MORPHINE SULFATE 15 MG: 15 TABLET, EXTENDED RELEASE ORAL at 08:04

## 2019-01-01 RX ADMIN — PREDNISONE 15 MG: 5 TABLET ORAL at 09:08

## 2019-01-01 RX ADMIN — Medication 175 MCG/HR: at 06:08

## 2019-01-01 RX ADMIN — Medication 1.5 MG: at 05:06

## 2019-01-01 RX ADMIN — GABAPENTIN 300 MG: 300 CAPSULE ORAL at 09:07

## 2019-01-01 RX ADMIN — ONDANSETRON 8 MG: 2 INJECTION INTRAMUSCULAR; INTRAVENOUS at 07:07

## 2019-01-01 RX ADMIN — Medication 16 G: at 10:07

## 2019-01-01 RX ADMIN — LORAZEPAM 2 MG: 2 INJECTION INTRAMUSCULAR; INTRAVENOUS at 01:06

## 2019-01-01 RX ADMIN — OXYCODONE HYDROCHLORIDE 10 MG: 10 TABLET ORAL at 10:04

## 2019-01-01 RX ADMIN — PHENYLEPHRINE HYDROCHLORIDE 3.5 MCG/KG/MIN: 10 INJECTION INTRAVENOUS at 05:08

## 2019-01-01 RX ADMIN — DIPHENHYDRAMINE HYDROCHLORIDE 50 MG: 50 INJECTION, SOLUTION INTRAMUSCULAR; INTRAVENOUS at 04:08

## 2019-01-01 RX ADMIN — DEXTROSE 200 MG: 50 INJECTION, SOLUTION INTRAVENOUS at 09:09

## 2019-01-01 RX ADMIN — MICAFUNGIN SODIUM 100 MG: 20 INJECTION, POWDER, LYOPHILIZED, FOR SOLUTION INTRAVENOUS at 12:07

## 2019-01-01 RX ADMIN — MORPHINE SULFATE 15 MG: 15 TABLET ORAL at 01:07

## 2019-01-01 RX ADMIN — TOPIRAMATE 25 MG: 25 TABLET, FILM COATED ORAL at 08:07

## 2019-01-01 RX ADMIN — HYDROMORPHONE HYDROCHLORIDE 0.2 MG: 1 INJECTION, SOLUTION INTRAMUSCULAR; INTRAVENOUS; SUBCUTANEOUS at 04:04

## 2019-01-01 RX ADMIN — ONDANSETRON 8 MG: 2 INJECTION INTRAMUSCULAR; INTRAVENOUS at 10:07

## 2019-01-01 RX ADMIN — LORAZEPAM 2 MG: 2 INJECTION INTRAMUSCULAR; INTRAVENOUS at 04:06

## 2019-01-01 RX ADMIN — Medication: at 05:08

## 2019-01-01 RX ADMIN — VANCOMYCIN HYDROCHLORIDE 750 MG: 750 INJECTION, POWDER, LYOPHILIZED, FOR SOLUTION INTRAVENOUS at 03:06

## 2019-01-01 RX ADMIN — LORAZEPAM 2 MG: 2 INJECTION INTRAMUSCULAR; INTRAVENOUS at 12:06

## 2019-01-01 RX ADMIN — OXYCODONE HYDROCHLORIDE 10 MG: 10 TABLET ORAL at 11:07

## 2019-01-01 RX ADMIN — VASOPRESSIN 0.08 UNITS/MIN: 20 INJECTION INTRAVENOUS at 11:08

## 2019-01-01 RX ADMIN — HEPARIN SODIUM 5000 UNITS: 5000 INJECTION INTRAVENOUS; SUBCUTANEOUS at 09:09

## 2019-01-01 RX ADMIN — VANCOMYCIN HYDROCHLORIDE 750 MG: 1 INJECTION, POWDER, LYOPHILIZED, FOR SOLUTION INTRAVENOUS at 09:07

## 2019-01-01 RX ADMIN — MAGNESIUM SULFATE IN WATER 2 G: 40 INJECTION, SOLUTION INTRAVENOUS at 06:09

## 2019-01-01 RX ADMIN — Medication 400 MG: at 09:04

## 2019-01-01 RX ADMIN — FENTANYL CITRATE 25 MCG: 50 INJECTION, SOLUTION INTRAMUSCULAR; INTRAVENOUS at 08:07

## 2019-01-01 RX ADMIN — DIPHENHYDRAMINE HYDROCHLORIDE 25 MG: 50 INJECTION, SOLUTION INTRAMUSCULAR; INTRAVENOUS at 08:07

## 2019-01-01 RX ADMIN — OXYCODONE HYDROCHLORIDE 10 MG: 10 TABLET ORAL at 05:07

## 2019-01-01 RX ADMIN — ROCURONIUM BROMIDE 30 MG: 10 INJECTION, SOLUTION INTRAVENOUS at 10:06

## 2019-01-01 RX ADMIN — INSULIN ASPART 10 UNITS: 100 INJECTION, SOLUTION INTRAVENOUS; SUBCUTANEOUS at 03:08

## 2019-01-01 RX ADMIN — HYDROMORPHONE HYDROCHLORIDE 1 MG: 1 INJECTION, SOLUTION INTRAMUSCULAR; INTRAVENOUS; SUBCUTANEOUS at 01:06

## 2019-01-01 RX ADMIN — MORPHINE SULFATE 15 MG: 15 TABLET ORAL at 09:07

## 2019-01-01 RX ADMIN — VASOPRESSIN 0.04 UNITS/MIN: 20 INJECTION INTRAVENOUS at 02:09

## 2019-01-01 RX ADMIN — ONDANSETRON 8 MG: 2 INJECTION INTRAMUSCULAR; INTRAVENOUS at 09:07

## 2019-01-01 RX ADMIN — FAMOTIDINE 20 MG: 20 TABLET, FILM COATED ORAL at 09:07

## 2019-01-01 RX ADMIN — GABAPENTIN 300 MG: 300 CAPSULE ORAL at 02:08

## 2019-01-01 RX ADMIN — CIPROFLOXACIN 400 MG: 2 INJECTION, SOLUTION INTRAVENOUS at 09:06

## 2019-01-01 RX ADMIN — ONDANSETRON 4 MG: 2 INJECTION INTRAMUSCULAR; INTRAVENOUS at 12:07

## 2019-01-01 RX ADMIN — INSULIN ASPART 10 UNITS: 100 INJECTION, SOLUTION INTRAVENOUS; SUBCUTANEOUS at 05:07

## 2019-01-01 RX ADMIN — CEFTOLOZANE AND TAZOBACTAM 1500 MG: 1; .5 INJECTION, POWDER, LYOPHILIZED, FOR SOLUTION INTRAVENOUS at 02:07

## 2019-01-01 RX ADMIN — DEXTROSE 200 MG: 50 INJECTION, SOLUTION INTRAVENOUS at 11:09

## 2019-01-01 RX ADMIN — SODIUM CHLORIDE 200 ML/HR: 0.9 INJECTION, SOLUTION INTRAVENOUS at 03:08

## 2019-01-01 RX ADMIN — GANCICLOVIR SODIUM 295 MG: 500 INJECTION, POWDER, LYOPHILIZED, FOR SOLUTION INTRAVENOUS at 11:07

## 2019-01-01 RX ADMIN — SODIUM CHLORIDE: 0.9 INJECTION, SOLUTION INTRAVENOUS at 10:08

## 2019-01-01 RX ADMIN — PANTOPRAZOLE SODIUM 40 MG: 40 TABLET, DELAYED RELEASE ORAL at 08:08

## 2019-01-01 RX ADMIN — CALCIUM GLUCONATE 1 G: 98 INJECTION, SOLUTION INTRAVENOUS at 04:09

## 2019-01-01 RX ADMIN — SODIUM PHOSPHATE, MONOBASIC, MONOHYDRATE 39.99 MMOL: 276; 142 INJECTION, SOLUTION INTRAVENOUS at 05:08

## 2019-01-01 RX ADMIN — SODIUM CHLORIDE: 0.9 INJECTION, SOLUTION INTRAVENOUS at 02:07

## 2019-01-01 RX ADMIN — OXYCODONE HYDROCHLORIDE 5 MG: 5 TABLET ORAL at 05:08

## 2019-01-01 RX ADMIN — CIPROFLOXACIN 400 MG: 2 INJECTION, SOLUTION INTRAVENOUS at 03:07

## 2019-01-01 RX ADMIN — PROPOFOL 50 MCG/KG/MIN: 10 INJECTION, EMULSION INTRAVENOUS at 08:06

## 2019-01-01 RX ADMIN — PROPOFOL 20 MG: 10 INJECTION, EMULSION INTRAVENOUS at 07:08

## 2019-01-01 RX ADMIN — MYCOPHENOLATE MOFETIL 500 MG: 500 INJECTION, POWDER, LYOPHILIZED, FOR SOLUTION INTRAVENOUS at 08:06

## 2019-01-01 RX ADMIN — OXYCODONE HYDROCHLORIDE 10 MG: 10 TABLET ORAL at 04:07

## 2019-01-01 RX ADMIN — MORPHINE SULFATE 15 MG: 15 TABLET, EXTENDED RELEASE ORAL at 08:07

## 2019-01-01 RX ADMIN — DAPSONE 100 MG: 100 TABLET ORAL at 10:04

## 2019-01-01 RX ADMIN — DOCUSATE SODIUM 100 MG: 50 LIQUID ORAL at 09:06

## 2019-01-01 RX ADMIN — MIDAZOLAM HYDROCHLORIDE 1 MG: 1 INJECTION, SOLUTION INTRAMUSCULAR; INTRAVENOUS at 07:08

## 2019-01-01 RX ADMIN — GABAPENTIN 300 MG: 300 CAPSULE ORAL at 09:04

## 2019-01-01 RX ADMIN — LORAZEPAM 2 MG: 2 INJECTION INTRAMUSCULAR; INTRAVENOUS at 10:07

## 2019-01-01 RX ADMIN — DAPSONE 100 MG: 100 TABLET ORAL at 08:07

## 2019-01-01 RX ADMIN — MAGNESIUM SULFATE IN WATER 2 G: 40 INJECTION, SOLUTION INTRAVENOUS at 09:07

## 2019-01-01 RX ADMIN — QUETIAPINE FUMARATE 50 MG: 25 TABLET, FILM COATED ORAL at 09:08

## 2019-01-01 RX ADMIN — DIPHENHYDRAMINE HYDROCHLORIDE 25 MG: 50 INJECTION, SOLUTION INTRAMUSCULAR; INTRAVENOUS at 06:07

## 2019-01-01 RX ADMIN — DAPSONE 100 MG: 100 TABLET ORAL at 08:06

## 2019-01-01 RX ADMIN — VANCOMYCIN HYDROCHLORIDE 750 MG: 750 INJECTION, POWDER, LYOPHILIZED, FOR SOLUTION INTRAVENOUS at 05:06

## 2019-01-01 RX ADMIN — DAPSONE 100 MG: 100 TABLET ORAL at 08:08

## 2019-01-01 RX ADMIN — PANTOPRAZOLE SODIUM 40 MG: 40 INJECTION, POWDER, LYOPHILIZED, FOR SOLUTION INTRAVENOUS at 08:09

## 2019-01-01 RX ADMIN — METOPROLOL TARTRATE 5 MG: 1 INJECTION, SOLUTION INTRAVENOUS at 01:06

## 2019-01-01 RX ADMIN — INSULIN ASPART 3 UNITS: 100 INJECTION, SOLUTION INTRAVENOUS; SUBCUTANEOUS at 05:07

## 2019-01-01 RX ADMIN — PROPOFOL 50 MCG/KG/MIN: 10 INJECTION, EMULSION INTRAVENOUS at 03:06

## 2019-01-01 RX ADMIN — DEXTROSE 230 MG: 50 INJECTION, SOLUTION INTRAVENOUS at 10:07

## 2019-01-01 RX ADMIN — ONDANSETRON 8 MG: 2 INJECTION INTRAMUSCULAR; INTRAVENOUS at 01:07

## 2019-01-01 RX ADMIN — FLUCONAZOLE 400 MG: 200 TABLET ORAL at 09:07

## 2019-01-01 RX ADMIN — CEFEPIME 1 G: 1 INJECTION, POWDER, FOR SOLUTION INTRAMUSCULAR; INTRAVENOUS at 08:07

## 2019-01-01 RX ADMIN — GANCICLOVIR SODIUM 295 MG: 500 INJECTION, POWDER, LYOPHILIZED, FOR SOLUTION INTRAVENOUS at 09:06

## 2019-01-01 RX ADMIN — HYDROMORPHONE HYDROCHLORIDE 1 MG: 1 INJECTION, SOLUTION INTRAMUSCULAR; INTRAVENOUS; SUBCUTANEOUS at 06:07

## 2019-01-01 RX ADMIN — MYCOPHENOLATE MOFETIL 500 MG: 250 CAPSULE ORAL at 08:07

## 2019-01-01 RX ADMIN — ALBUMIN (HUMAN) 12.5 G: 12.5 SOLUTION INTRAVENOUS at 03:06

## 2019-01-01 RX ADMIN — LEVALBUTEROL 1.25 MG: 1.25 SOLUTION, CONCENTRATE RESPIRATORY (INHALATION) at 01:06

## 2019-01-01 RX ADMIN — VANCOMYCIN HYDROCHLORIDE 750 MG: 750 INJECTION, POWDER, LYOPHILIZED, FOR SOLUTION INTRAVENOUS at 04:06

## 2019-01-01 RX ADMIN — MAGNESIUM SULFATE IN WATER 2 G: 40 INJECTION, SOLUTION INTRAVENOUS at 11:07

## 2019-01-01 RX ADMIN — LEVALBUTEROL 1.25 MG: 1.25 SOLUTION, CONCENTRATE RESPIRATORY (INHALATION) at 07:07

## 2019-01-01 RX ADMIN — PROPOFOL 200 MCG/KG/MIN: 10 INJECTION, EMULSION INTRAVENOUS at 02:07

## 2019-01-01 RX ADMIN — LEVALBUTEROL 1.25 MG: 1.25 SOLUTION, CONCENTRATE RESPIRATORY (INHALATION) at 08:07

## 2019-01-01 RX ADMIN — MEROPENEM AND SODIUM CHLORIDE 1 G: 1 INJECTION, SOLUTION INTRAVENOUS at 05:04

## 2019-01-01 RX ADMIN — FLUTICASONE PROPIONATE 100 MCG: 50 SPRAY, METERED NASAL at 08:08

## 2019-01-01 RX ADMIN — ONDANSETRON 8 MG: 2 INJECTION INTRAMUSCULAR; INTRAVENOUS at 06:07

## 2019-01-01 RX ADMIN — ENOXAPARIN SODIUM 40 MG: 100 INJECTION SUBCUTANEOUS at 04:04

## 2019-01-01 RX ADMIN — HYDROMORPHONE HYDROCHLORIDE 1 MG: 1 INJECTION, SOLUTION INTRAMUSCULAR; INTRAVENOUS; SUBCUTANEOUS at 07:06

## 2019-01-01 RX ADMIN — CIPROFLOXACIN 400 MG: 2 INJECTION, SOLUTION INTRAVENOUS at 05:07

## 2019-01-01 RX ADMIN — INSULIN ASPART 4 UNITS: 100 INJECTION, SOLUTION INTRAVENOUS; SUBCUTANEOUS at 12:08

## 2019-01-01 RX ADMIN — ONDANSETRON 8 MG: 2 INJECTION INTRAMUSCULAR; INTRAVENOUS at 11:07

## 2019-01-01 RX ADMIN — LEVALBUTEROL HYDROCHLORIDE 1.25 MG: 1.25 SOLUTION, CONCENTRATE RESPIRATORY (INHALATION) at 03:08

## 2019-01-01 RX ADMIN — TIZANIDINE 4 MG: 4 TABLET ORAL at 12:04

## 2019-01-01 RX ADMIN — MYCOPHENOLATE MOFETIL 500 MG: 500 INJECTION, POWDER, LYOPHILIZED, FOR SOLUTION INTRAVENOUS at 05:06

## 2019-01-01 RX ADMIN — TOBRAMYCIN SULFATE 90 MG: 40 INJECTION, SOLUTION INTRAMUSCULAR; INTRAVENOUS at 09:09

## 2019-01-01 RX ADMIN — DEXMEDETOMIDINE HYDROCHLORIDE 1.4 MCG/KG/HR: 100 INJECTION, SOLUTION, CONCENTRATE INTRAVENOUS at 11:06

## 2019-01-01 RX ADMIN — PANCRELIPASE 4 CAPSULE: 24000; 76000; 120000 CAPSULE, DELAYED RELEASE PELLETS ORAL at 08:07

## 2019-01-01 RX ADMIN — HYDROMORPHONE HYDROCHLORIDE 0.2 MG: 1 INJECTION, SOLUTION INTRAMUSCULAR; INTRAVENOUS; SUBCUTANEOUS at 12:04

## 2019-01-01 RX ADMIN — VASOPRESSIN 0.04 UNITS/MIN: 20 INJECTION INTRAVENOUS at 03:08

## 2019-01-01 RX ADMIN — SODIUM CHLORIDE 0.4 UNITS/HR: 9 INJECTION, SOLUTION INTRAVENOUS at 04:08

## 2019-01-01 RX ADMIN — DIPHENHYDRAMINE HYDROCHLORIDE 50 MG: 50 INJECTION, SOLUTION INTRAMUSCULAR; INTRAVENOUS at 05:06

## 2019-01-01 RX ADMIN — OXYCODONE HYDROCHLORIDE 10 MG: 10 TABLET ORAL at 09:04

## 2019-01-01 RX ADMIN — MORPHINE SULFATE 15 MG: 15 TABLET, EXTENDED RELEASE ORAL at 10:04

## 2019-01-01 RX ADMIN — METOPROLOL TARTRATE 25 MG: 25 TABLET, FILM COATED ORAL at 08:07

## 2019-01-01 RX ADMIN — TACROLIMUS 1 MG: 1 CAPSULE ORAL at 09:07

## 2019-01-01 RX ADMIN — GABAPENTIN 300 MG: 300 CAPSULE ORAL at 03:04

## 2019-01-01 RX ADMIN — INSULIN ASPART 6 UNITS: 100 INJECTION, SOLUTION INTRAVENOUS; SUBCUTANEOUS at 04:07

## 2019-01-01 RX ADMIN — ONDANSETRON 4 MG: 2 INJECTION INTRAMUSCULAR; INTRAVENOUS at 07:06

## 2019-01-01 RX ADMIN — BUPIVACAINE HYDROCHLORIDE 30 ML: 7.5 INJECTION, SOLUTION EPIDURAL; RETROBULBAR at 01:06

## 2019-01-01 RX ADMIN — MICAFUNGIN SODIUM 100 MG: 20 INJECTION, POWDER, LYOPHILIZED, FOR SOLUTION INTRAVENOUS at 11:08

## 2019-01-01 RX ADMIN — FAMOTIDINE 20 MG: 20 TABLET, FILM COATED ORAL at 08:07

## 2019-01-01 RX ADMIN — OLANZAPINE 10 MG: 10 INJECTION, POWDER, LYOPHILIZED, FOR SOLUTION INTRAMUSCULAR at 03:06

## 2019-01-01 RX ADMIN — MYCOPHENOLATE MOFETIL 500 MG: 250 CAPSULE ORAL at 09:07

## 2019-01-01 RX ADMIN — MEROPENEM AND SODIUM CHLORIDE 1 G: 1 INJECTION, SOLUTION INTRAVENOUS at 11:06

## 2019-01-01 RX ADMIN — BISACODYL 10 MG RECTAL SUPPOSITORY 10 MG: at 10:06

## 2019-01-01 RX ADMIN — POLYETHYLENE GLYCOL 3350 17 G: 17 POWDER, FOR SOLUTION ORAL at 09:06

## 2019-01-01 RX ADMIN — LOPERAMIDE HYDROCHLORIDE 2 MG: 2 CAPSULE ORAL at 06:07

## 2019-01-01 RX ADMIN — LOPERAMIDE HYDROCHLORIDE 2 MG: 2 CAPSULE ORAL at 01:07

## 2019-01-01 RX ADMIN — ANGIOTENSIN II 40 NG/KG/MIN: 2.5 INJECTION INTRAVENOUS at 04:09

## 2019-01-01 RX ADMIN — FENTANYL CITRATE 200 MCG: 50 INJECTION, SOLUTION INTRAMUSCULAR; INTRAVENOUS at 01:06

## 2019-01-01 RX ADMIN — SODIUM BICARBONATE 50 MEQ: 84 INJECTION, SOLUTION INTRAVENOUS at 12:08

## 2019-01-01 RX ADMIN — VANCOMYCIN HYDROCHLORIDE 1000 MG: 1 INJECTION, POWDER, LYOPHILIZED, FOR SOLUTION INTRAVENOUS at 04:04

## 2019-01-01 RX ADMIN — MYCOPHENOLATE MOFETIL 500 MG: 500 INJECTION, POWDER, LYOPHILIZED, FOR SOLUTION INTRAVENOUS at 07:06

## 2019-01-01 RX ADMIN — VENLAFAXINE HYDROCHLORIDE 37.5 MG: 37.5 CAPSULE, EXTENDED RELEASE ORAL at 10:07

## 2019-01-01 RX ADMIN — PANCRELIPASE 5 CAPSULE: 24000; 76000; 120000 CAPSULE, DELAYED RELEASE PELLETS ORAL at 05:07

## 2019-01-01 RX ADMIN — METOPROLOL TARTRATE 5 MG: 1 INJECTION, SOLUTION INTRAVENOUS at 12:06

## 2019-01-01 RX ADMIN — PANCRELIPASE 1 TABLET: 20880; 78300; 78300 TABLET ORAL at 09:06

## 2019-01-01 RX ADMIN — FAMOTIDINE 20 MG: 10 INJECTION, SOLUTION INTRAVENOUS at 09:06

## 2019-01-01 RX ADMIN — GABAPENTIN 250 MG: 250 SOLUTION ORAL at 09:06

## 2019-01-01 RX ADMIN — GABAPENTIN 250 MG: 250 SOLUTION ORAL at 01:06

## 2019-01-01 RX ADMIN — FENTANYL CITRATE 100 MCG: 50 INJECTION INTRAMUSCULAR; INTRAVENOUS at 02:06

## 2019-01-01 RX ADMIN — PROPOFOL 10 MCG/KG/MIN: 10 INJECTION, EMULSION INTRAVENOUS at 09:06

## 2019-01-01 RX ADMIN — DEXMEDETOMIDINE HYDROCHLORIDE 1.4 MCG/KG/HR: 100 INJECTION, SOLUTION, CONCENTRATE INTRAVENOUS at 07:06

## 2019-01-01 RX ADMIN — DIPHENHYDRAMINE HYDROCHLORIDE 12.5 MG: 50 INJECTION INTRAMUSCULAR; INTRAVENOUS at 10:04

## 2019-01-01 RX ADMIN — GABAPENTIN 300 MG: 300 CAPSULE ORAL at 03:07

## 2019-01-01 RX ADMIN — LEVALBUTEROL 1.25 MG: 1.25 SOLUTION, CONCENTRATE RESPIRATORY (INHALATION) at 07:06

## 2019-01-01 RX ADMIN — OXYCODONE HYDROCHLORIDE 10 MG: 10 TABLET ORAL at 11:04

## 2019-01-01 RX ADMIN — DAPSONE 100 MG: 100 TABLET ORAL at 08:04

## 2019-01-01 RX ADMIN — LEVALBUTEROL 1.25 MG: 1.25 SOLUTION, CONCENTRATE RESPIRATORY (INHALATION) at 02:07

## 2019-01-01 RX ADMIN — DIPHENHYDRAMINE HYDROCHLORIDE 50 MG: 50 INJECTION, SOLUTION INTRAMUSCULAR; INTRAVENOUS at 08:04

## 2019-01-01 RX ADMIN — GABAPENTIN 300 MG: 300 CAPSULE ORAL at 08:07

## 2019-01-01 RX ADMIN — HYDROMORPHONE HYDROCHLORIDE 0.2 MG: 1 INJECTION, SOLUTION INTRAMUSCULAR; INTRAVENOUS; SUBCUTANEOUS at 03:04

## 2019-01-01 RX ADMIN — POLYETHYLENE GLYCOL 3350 17 G: 17 POWDER, FOR SOLUTION ORAL at 08:06

## 2019-01-01 RX ADMIN — INSULIN ASPART 2 UNITS: 100 INJECTION, SOLUTION INTRAVENOUS; SUBCUTANEOUS at 08:06

## 2019-01-01 RX ADMIN — HYDROCORTISONE SODIUM SUCCINATE 50 MG: 100 INJECTION, POWDER, FOR SOLUTION INTRAMUSCULAR; INTRAVENOUS at 11:09

## 2019-01-01 RX ADMIN — BUTALBITAL, ACETAMINOPHEN AND CAFFEINE 2 TABLET: 50; 325; 40 TABLET ORAL at 12:04

## 2019-01-01 RX ADMIN — INSULIN ASPART 2 UNITS: 100 INJECTION, SOLUTION INTRAVENOUS; SUBCUTANEOUS at 09:07

## 2019-01-01 RX ADMIN — MICAFUNGIN SODIUM 100 MG: 20 INJECTION, POWDER, LYOPHILIZED, FOR SOLUTION INTRAVENOUS at 02:07

## 2019-01-01 RX ADMIN — CALCIUM GLUCONATE 1 G: 94 INJECTION, SOLUTION INTRAVENOUS at 12:06

## 2019-01-01 RX ADMIN — INSULIN ASPART 4 UNITS: 100 INJECTION, SOLUTION INTRAVENOUS; SUBCUTANEOUS at 03:07

## 2019-01-01 RX ADMIN — DEXMEDETOMIDINE HYDROCHLORIDE 1.4 MCG/KG/HR: 100 INJECTION, SOLUTION, CONCENTRATE INTRAVENOUS at 03:06

## 2019-01-01 RX ADMIN — LACTULOSE 20 G: 20 SOLUTION ORAL at 08:06

## 2019-01-01 RX ADMIN — OXYCODONE HYDROCHLORIDE 10 MG: 10 TABLET ORAL at 12:07

## 2019-01-01 RX ADMIN — METOPROLOL TARTRATE 5 MG: 1 INJECTION, SOLUTION INTRAVENOUS at 04:06

## 2019-01-01 RX ADMIN — ACETAMINOPHEN 1000 MG: 10 INJECTION, SOLUTION INTRAVENOUS at 10:06

## 2019-01-01 RX ADMIN — DIPHENHYDRAMINE HYDROCHLORIDE 25 MG: 50 INJECTION, SOLUTION INTRAMUSCULAR; INTRAVENOUS at 03:07

## 2019-01-01 RX ADMIN — TOPIRAMATE 25 MG: 25 TABLET, FILM COATED ORAL at 09:04

## 2019-01-01 RX ADMIN — PREDNISONE 15 MG: 5 TABLET ORAL at 10:08

## 2019-01-01 RX ADMIN — HEPARIN SODIUM 5000 UNITS: 5000 INJECTION INTRAVENOUS; SUBCUTANEOUS at 09:08

## 2019-01-01 RX ADMIN — LORAZEPAM 2 MG: 2 INJECTION INTRAMUSCULAR; INTRAVENOUS at 05:07

## 2019-01-01 RX ADMIN — CEFTOLOZANE AND TAZOBACTAM 1500 MG: 1; .5 INJECTION, POWDER, LYOPHILIZED, FOR SOLUTION INTRAVENOUS at 07:08

## 2019-01-01 RX ADMIN — MYCOPHENOLATE MOFETIL 500 MG: 500 INJECTION, POWDER, LYOPHILIZED, FOR SOLUTION INTRAVENOUS at 10:07

## 2019-01-01 RX ADMIN — GABAPENTIN 300 MG: 300 CAPSULE ORAL at 08:04

## 2019-01-01 RX ADMIN — LEVALBUTEROL 1.25 MG: 1.25 SOLUTION, CONCENTRATE RESPIRATORY (INHALATION) at 03:06

## 2019-01-01 RX ADMIN — TIZANIDINE 4 MG: 4 TABLET ORAL at 07:08

## 2019-01-01 RX ADMIN — NICARDIPINE HYDROCHLORIDE 2.5 MG/HR: 0.2 INJECTION, SOLUTION INTRAVENOUS at 03:06

## 2019-01-01 RX ADMIN — CIPROFLOXACIN 400 MG: 2 INJECTION, SOLUTION INTRAVENOUS at 02:06

## 2019-01-01 RX ADMIN — INSULIN ASPART 1 UNITS: 100 INJECTION, SOLUTION INTRAVENOUS; SUBCUTANEOUS at 12:06

## 2019-01-01 RX ADMIN — TACROLIMUS 2 MG: 1 CAPSULE ORAL at 08:06

## 2019-01-01 RX ADMIN — LORAZEPAM 2 MG: 2 INJECTION INTRAMUSCULAR; INTRAVENOUS at 02:07

## 2019-01-01 RX ADMIN — Medication 400 MG: at 08:04

## 2019-01-01 RX ADMIN — ROCURONIUM BROMIDE 100 MG: 10 INJECTION, SOLUTION INTRAVENOUS at 05:06

## 2019-01-01 RX ADMIN — GABAPENTIN 300 MG: 300 CAPSULE ORAL at 08:08

## 2019-01-01 RX ADMIN — SODIUM CHLORIDE: 0.9 INJECTION, SOLUTION INTRAVENOUS at 11:08

## 2019-01-01 RX ADMIN — METOCLOPRAMIDE 5 MG: 5 INJECTION, SOLUTION INTRAMUSCULAR; INTRAVENOUS at 02:06

## 2019-01-01 RX ADMIN — PHENYLEPHRINE HYDROCHLORIDE 4.5 MCG/KG/MIN: 10 INJECTION INTRAVENOUS at 08:09

## 2019-01-01 RX ADMIN — PROPOFOL 50 MG: 10 INJECTION, EMULSION INTRAVENOUS at 02:07

## 2019-01-01 RX ADMIN — METHYLPREDNISOLONE SODIUM SUCCINATE 20 MG: 40 INJECTION, POWDER, FOR SOLUTION INTRAMUSCULAR; INTRAVENOUS at 08:06

## 2019-01-01 RX ADMIN — FLUCONAZOLE 400 MG: 2 INJECTION, SOLUTION INTRAVENOUS at 07:06

## 2019-01-01 RX ADMIN — LOPERAMIDE HYDROCHLORIDE 2 MG: 2 CAPSULE ORAL at 10:07

## 2019-01-01 RX ADMIN — VORICONAZOLE 200 MG: 200 TABLET, FILM COATED ORAL at 07:08

## 2019-01-01 RX ADMIN — HEPARIN SODIUM (PORCINE) LOCK FLUSH IV SOLN 100 UNIT/ML 500 UNITS: 100 SOLUTION at 06:07

## 2019-01-01 RX ADMIN — Medication 10 ML: at 08:07

## 2019-01-01 RX ADMIN — QUETIAPINE FUMARATE 25 MG: 25 TABLET ORAL at 09:04

## 2019-01-01 RX ADMIN — CEFTOLOZANE AND TAZOBACTAM 1500 MG: 1; .5 INJECTION, POWDER, LYOPHILIZED, FOR SOLUTION INTRAVENOUS at 04:07

## 2019-01-01 RX ADMIN — ENOXAPARIN SODIUM 40 MG: 100 INJECTION SUBCUTANEOUS at 06:04

## 2019-01-01 RX ADMIN — HYDROCORTISONE SODIUM SUCCINATE 50 MG: 100 INJECTION, POWDER, FOR SOLUTION INTRAMUSCULAR; INTRAVENOUS at 05:09

## 2019-01-01 RX ADMIN — LORAZEPAM 2 MG: 2 INJECTION INTRAMUSCULAR; INTRAVENOUS at 09:07

## 2019-01-01 RX ADMIN — PANCRELIPASE 4 CAPSULE: 24000; 76000; 120000 CAPSULE, DELAYED RELEASE PELLETS ORAL at 12:07

## 2019-01-01 RX ADMIN — PREDNISONE 20 MG: 10 TABLET ORAL at 11:07

## 2019-01-01 RX ADMIN — MIDAZOLAM HYDROCHLORIDE 2 MG: 1 INJECTION, SOLUTION INTRAMUSCULAR; INTRAVENOUS at 11:05

## 2019-01-01 RX ADMIN — VENLAFAXINE HYDROCHLORIDE 150 MG: 150 CAPSULE, EXTENDED RELEASE ORAL at 09:07

## 2019-01-01 RX ADMIN — MEROPENEM AND SODIUM CHLORIDE 1 G: 1 INJECTION, SOLUTION INTRAVENOUS at 06:06

## 2019-01-01 RX ADMIN — PREDNISONE 5 MG: 5 TABLET ORAL at 09:04

## 2019-01-01 RX ADMIN — ONDANSETRON 4 MG: 2 INJECTION INTRAMUSCULAR; INTRAVENOUS at 06:06

## 2019-01-01 RX ADMIN — HYDROMORPHONE HYDROCHLORIDE 1 MG: 1 INJECTION, SOLUTION INTRAMUSCULAR; INTRAVENOUS; SUBCUTANEOUS at 06:06

## 2019-01-01 RX ADMIN — LEVALBUTEROL 1.25 MG: 1.25 SOLUTION, CONCENTRATE RESPIRATORY (INHALATION) at 03:04

## 2019-01-01 RX ADMIN — MORPHINE SULFATE 15 MG: 15 TABLET ORAL at 03:07

## 2019-01-01 RX ADMIN — PROPOFOL 50 MCG/KG/MIN: 10 INJECTION, EMULSION INTRAVENOUS at 12:08

## 2019-01-01 RX ADMIN — ONDANSETRON 8 MG: 2 INJECTION INTRAMUSCULAR; INTRAVENOUS at 12:08

## 2019-01-01 RX ADMIN — LEVALBUTEROL 1.25 MG: 1.25 SOLUTION, CONCENTRATE RESPIRATORY (INHALATION) at 03:07

## 2019-01-01 RX ADMIN — DEXMEDETOMIDINE HYDROCHLORIDE 1.4 MCG/KG/HR: 4 INJECTION, SOLUTION INTRAVENOUS at 03:08

## 2019-01-01 RX ADMIN — ACETYLCYSTEINE 4 ML: 200 INHALANT RESPIRATORY (INHALATION) at 08:06

## 2019-01-01 RX ADMIN — SODIUM CHLORIDE: 0.9 INJECTION, SOLUTION INTRAVENOUS at 11:05

## 2019-01-01 RX ADMIN — GABAPENTIN 300 MG: 300 CAPSULE ORAL at 10:04

## 2019-01-01 RX ADMIN — LEVALBUTEROL 1.25 MG: 1.25 SOLUTION, CONCENTRATE RESPIRATORY (INHALATION) at 12:07

## 2019-01-01 RX ADMIN — INSULIN ASPART 1 UNITS: 100 INJECTION, SOLUTION INTRAVENOUS; SUBCUTANEOUS at 09:08

## 2019-01-01 RX ADMIN — DIPHENHYDRAMINE HYDROCHLORIDE 50 MG: 50 INJECTION, SOLUTION INTRAMUSCULAR; INTRAVENOUS at 04:04

## 2019-01-01 RX ADMIN — ENOXAPARIN SODIUM 40 MG: 100 INJECTION SUBCUTANEOUS at 04:06

## 2019-01-01 RX ADMIN — PANCRELIPASE 1 TABLET: 10440; 39150; 39150 TABLET ORAL at 07:08

## 2019-01-01 RX ADMIN — CHLORHEXIDINE GLUCONATE 0.12% ORAL RINSE 15 ML: 1.2 LIQUID ORAL at 08:08

## 2019-01-01 RX ADMIN — TIZANIDINE 4 MG: 4 TABLET ORAL at 11:04

## 2019-01-01 RX ADMIN — SODIUM PHOSPHATE, MONOBASIC, MONOHYDRATE 39.99 MMOL: 276; 142 INJECTION, SOLUTION INTRAVENOUS at 06:08

## 2019-01-01 RX ADMIN — PREDNISONE 5 MG: 5 TABLET ORAL at 10:04

## 2019-01-01 RX ADMIN — LEVALBUTEROL 1.25 MG: 1.25 SOLUTION, CONCENTRATE RESPIRATORY (INHALATION) at 08:04

## 2019-01-01 RX ADMIN — OYSTER SHELL CALCIUM WITH VITAMIN D 1 TABLET: 500; 200 TABLET, FILM COATED ORAL at 04:04

## 2019-01-01 RX ADMIN — DEXTROSE 200 MG: 50 INJECTION, SOLUTION INTRAVENOUS at 10:09

## 2019-01-01 RX ADMIN — LIDOCAINE 1 PATCH: 50 PATCH TOPICAL at 12:06

## 2019-01-01 RX ADMIN — BUTALBITAL, ACETAMINOPHEN AND CAFFEINE 2 TABLET: 50; 325; 40 TABLET ORAL at 05:04

## 2019-01-01 RX ADMIN — PROPOFOL 50 MCG/KG/MIN: 10 INJECTION, EMULSION INTRAVENOUS at 05:06

## 2019-01-01 RX ADMIN — CEFTOLOZANE AND TAZOBACTAM 1500 MG: 1; .5 INJECTION, POWDER, LYOPHILIZED, FOR SOLUTION INTRAVENOUS at 09:07

## 2019-01-01 RX ADMIN — VENLAFAXINE HYDROCHLORIDE 150 MG: 37.5 CAPSULE, EXTENDED RELEASE ORAL at 12:04

## 2019-01-01 RX ADMIN — LEVALBUTEROL HYDROCHLORIDE 1.25 MG: 1.25 SOLUTION, CONCENTRATE RESPIRATORY (INHALATION) at 04:08

## 2019-01-01 RX ADMIN — MYCOPHENOLATE MOFETIL 500 MG: 500 INJECTION, POWDER, LYOPHILIZED, FOR SOLUTION INTRAVENOUS at 09:07

## 2019-01-01 RX ADMIN — DAPSONE 100 MG: 100 TABLET ORAL at 09:04

## 2019-01-01 RX ADMIN — CHLORHEXIDINE GLUCONATE 0.12% ORAL RINSE 15 ML: 1.2 LIQUID ORAL at 09:08

## 2019-01-01 RX ADMIN — HEPARIN SODIUM 5000 UNITS: 5000 INJECTION, SOLUTION INTRAVENOUS; SUBCUTANEOUS at 09:08

## 2019-01-01 RX ADMIN — TACROLIMUS 2.5 MG: 1 CAPSULE ORAL at 05:04

## 2019-01-01 RX ADMIN — ONDANSETRON 8 MG: 2 INJECTION INTRAMUSCULAR; INTRAVENOUS at 05:07

## 2019-01-01 RX ADMIN — LEVALBUTEROL HYDROCHLORIDE 1.25 MG: 1.25 SOLUTION, CONCENTRATE RESPIRATORY (INHALATION) at 03:09

## 2019-01-01 RX ADMIN — HEPARIN SODIUM 5000 UNITS: 5000 INJECTION INTRAVENOUS; SUBCUTANEOUS at 11:08

## 2019-01-01 RX ADMIN — LORAZEPAM 1 MG: 1 TABLET ORAL at 04:06

## 2019-01-01 RX ADMIN — QUETIAPINE FUMARATE 50 MG: 25 TABLET ORAL at 08:04

## 2019-01-01 RX ADMIN — PANCRELIPASE 1 TABLET: 20880; 78300; 78300 TABLET ORAL at 12:06

## 2019-01-01 RX ADMIN — DOCUSATE SODIUM 100 MG: 50 LIQUID ORAL at 08:06

## 2019-01-01 RX ADMIN — CARBOXYMETHYLCELLULOSE SODIUM 9 DROP: 10 GEL OPHTHALMIC at 09:09

## 2019-01-01 RX ADMIN — LORAZEPAM 2 MG: 2 INJECTION INTRAMUSCULAR; INTRAVENOUS at 06:07

## 2019-01-01 RX ADMIN — PROPOFOL 50 MCG/KG/MIN: 10 INJECTION, EMULSION INTRAVENOUS at 05:08

## 2019-01-01 RX ADMIN — INSULIN ASPART 3 UNITS: 100 INJECTION, SOLUTION INTRAVENOUS; SUBCUTANEOUS at 11:08

## 2019-01-01 RX ADMIN — EPINEPHRINE 0.04 MCG/KG/MIN: 1 INJECTION INTRAMUSCULAR; INTRAVENOUS; SUBCUTANEOUS at 11:06

## 2019-01-01 RX ADMIN — MEROPENEM AND SODIUM CHLORIDE 1 G: 1 INJECTION, SOLUTION INTRAVENOUS at 06:04

## 2019-01-01 RX ADMIN — METHOCARBAMOL 1000 MG: 100 INJECTION INTRAMUSCULAR; INTRAVENOUS at 05:06

## 2019-01-01 RX ADMIN — OXYCODONE HYDROCHLORIDE 10 MG: 10 TABLET ORAL at 06:04

## 2019-01-01 RX ADMIN — Medication: at 02:08

## 2019-01-01 RX ADMIN — INSULIN ASPART 8 UNITS: 100 INJECTION, SOLUTION INTRAVENOUS; SUBCUTANEOUS at 03:06

## 2019-01-01 RX ADMIN — PREDNISONE 5 MG: 5 TABLET ORAL at 08:04

## 2019-01-01 RX ADMIN — HYDROMORPHONE HYDROCHLORIDE 0.75 MG: 1 INJECTION, SOLUTION INTRAMUSCULAR; INTRAVENOUS; SUBCUTANEOUS at 10:07

## 2019-01-01 RX ADMIN — MIDAZOLAM HYDROCHLORIDE 1 MG/HR: 5 INJECTION, SOLUTION INTRAMUSCULAR; INTRAVENOUS at 07:08

## 2019-01-01 RX ADMIN — PREDNISONE 15 MG: 5 TABLET ORAL at 08:07

## 2019-01-01 RX ADMIN — FOLIC ACID 1000 MCG: 1 TABLET ORAL at 08:04

## 2019-01-01 RX ADMIN — TOPIRAMATE 25 MG: 25 TABLET, FILM COATED ORAL at 12:04

## 2019-01-01 RX ADMIN — DEXMEDETOMIDINE HYDROCHLORIDE 1.4 MCG/KG/HR: 100 INJECTION, SOLUTION, CONCENTRATE INTRAVENOUS at 04:06

## 2019-01-01 RX ADMIN — MAGNESIUM SULFATE IN WATER 2 G: 40 INJECTION, SOLUTION INTRAVENOUS at 05:06

## 2019-01-01 RX ADMIN — DEXMEDETOMIDINE HYDROCHLORIDE 1.4 MCG/KG/HR: 100 INJECTION, SOLUTION, CONCENTRATE INTRAVENOUS at 12:06

## 2019-01-01 RX ADMIN — LEVALBUTEROL 1.25 MG: 1.25 SOLUTION, CONCENTRATE RESPIRATORY (INHALATION) at 04:06

## 2019-01-01 RX ADMIN — PANCRELIPASE 6 CAPSULE: 24000; 76000; 120000 CAPSULE, DELAYED RELEASE PELLETS ORAL at 08:07

## 2019-01-01 RX ADMIN — OXYCODONE HYDROCHLORIDE 10 MG: 10 TABLET ORAL at 09:07

## 2019-01-01 RX ADMIN — ETOMIDATE 20 MG: 2 INJECTION, SOLUTION INTRAVENOUS at 06:08

## 2019-01-01 RX ADMIN — INSULIN ASPART 2 UNITS: 100 INJECTION, SOLUTION INTRAVENOUS; SUBCUTANEOUS at 03:07

## 2019-01-01 RX ADMIN — NOREPINEPHRINE BITARTRATE 1.3 MCG/KG/MIN: 1 INJECTION, SOLUTION, CONCENTRATE INTRAVENOUS at 03:08

## 2019-01-01 RX ADMIN — LEVALBUTEROL 1.25 MG: 1.25 SOLUTION, CONCENTRATE RESPIRATORY (INHALATION) at 08:06

## 2019-01-01 RX ADMIN — METOPROLOL TARTRATE 25 MG: 25 TABLET ORAL at 08:08

## 2019-01-01 RX ADMIN — INSULIN ASPART 4 UNITS: 100 INJECTION, SOLUTION INTRAVENOUS; SUBCUTANEOUS at 08:07

## 2019-01-01 RX ADMIN — DIPHENHYDRAMINE HYDROCHLORIDE 25 MG: 50 INJECTION, SOLUTION INTRAMUSCULAR; INTRAVENOUS at 07:07

## 2019-01-01 RX ADMIN — GABAPENTIN 300 MG: 300 CAPSULE ORAL at 02:04

## 2019-01-01 RX ADMIN — HYDROMORPHONE HYDROCHLORIDE: 2 INJECTION INTRAMUSCULAR; INTRAVENOUS; SUBCUTANEOUS at 03:07

## 2019-01-01 RX ADMIN — DIPHENHYDRAMINE HYDROCHLORIDE 25 MG: 25 CAPSULE ORAL at 09:04

## 2019-01-01 RX ADMIN — FLUCONAZOLE 400 MG: 40 POWDER, FOR SUSPENSION ORAL at 07:06

## 2019-01-01 RX ADMIN — DEXMEDETOMIDINE HYDROCHLORIDE 1.2 MCG/KG/HR: 100 INJECTION, SOLUTION, CONCENTRATE INTRAVENOUS at 08:06

## 2019-01-01 RX ADMIN — PROPOFOL 20 MCG/KG/MIN: 10 INJECTION, EMULSION INTRAVENOUS at 05:06

## 2019-01-01 RX ADMIN — TACROLIMUS 2.5 MG: 1 CAPSULE ORAL at 06:04

## 2019-01-01 RX ADMIN — VASOPRESSIN 0.04 UNITS/MIN: 20 INJECTION INTRAVENOUS at 12:09

## 2019-01-01 RX ADMIN — MAGNESIUM SULFATE IN WATER 2 G: 40 INJECTION, SOLUTION INTRAVENOUS at 01:09

## 2019-01-01 RX ADMIN — HYDROMORPHONE HYDROCHLORIDE 1 MG: 1 INJECTION, SOLUTION INTRAMUSCULAR; INTRAVENOUS; SUBCUTANEOUS at 01:07

## 2019-01-01 RX ADMIN — HEPARIN SODIUM 5000 UNITS: 5000 INJECTION, SOLUTION INTRAVENOUS; SUBCUTANEOUS at 08:08

## 2019-01-01 RX ADMIN — SODIUM CHLORIDE 5 MCG/KG/MIN: 0.9 INJECTION, SOLUTION INTRAVENOUS at 11:06

## 2019-01-01 RX ADMIN — MUPIROCIN 1 G: 20 OINTMENT TOPICAL at 09:06

## 2019-01-01 RX ADMIN — FLUTICASONE PROPIONATE 100 MCG: 50 SPRAY, METERED NASAL at 09:04

## 2019-01-01 RX ADMIN — VANCOMYCIN HYDROCHLORIDE 750 MG: 750 INJECTION, POWDER, LYOPHILIZED, FOR SOLUTION INTRAVENOUS at 07:06

## 2019-01-01 RX ADMIN — MEROPENEM AND SODIUM CHLORIDE 1 G: 1 INJECTION, SOLUTION INTRAVENOUS at 03:06

## 2019-01-01 RX ADMIN — MAGNESIUM CITRATE 296 ML: 1.75 LIQUID ORAL at 04:06

## 2019-01-01 RX ADMIN — DIPHENHYDRAMINE HYDROCHLORIDE 50 MG: 50 INJECTION INTRAMUSCULAR; INTRAVENOUS at 07:07

## 2019-01-01 RX ADMIN — MAGNESIUM SULFATE IN WATER 2 G: 40 INJECTION, SOLUTION INTRAVENOUS at 02:04

## 2019-01-01 RX ADMIN — FLUCONAZOLE 150 MG: 150 TABLET ORAL at 11:04

## 2019-01-01 RX ADMIN — FAMOTIDINE 20 MG: 10 INJECTION INTRAVENOUS at 09:06

## 2019-01-01 RX ADMIN — FAMOTIDINE 20 MG: 10 INJECTION INTRAVENOUS at 08:06

## 2019-01-01 RX ADMIN — OXYCODONE HYDROCHLORIDE 10 MG: 10 TABLET ORAL at 12:08

## 2019-01-01 RX ADMIN — VANCOMYCIN HYDROCHLORIDE 1000 MG: 1 INJECTION, POWDER, LYOPHILIZED, FOR SOLUTION INTRAVENOUS at 08:04

## 2019-01-01 RX ADMIN — SODIUM CHLORIDE 500 ML: 0.9 INJECTION, SOLUTION INTRAVENOUS at 06:08

## 2019-01-01 RX ADMIN — MORPHINE SULFATE 15 MG: 15 TABLET ORAL at 05:07

## 2019-01-01 RX ADMIN — MAGNESIUM SULFATE IN WATER 2 G: 40 INJECTION, SOLUTION INTRAVENOUS at 12:09

## 2019-01-01 RX ADMIN — TACROLIMUS 2.5 MG: 1 CAPSULE ORAL at 08:04

## 2019-01-01 RX ADMIN — LOPERAMIDE HYDROCHLORIDE 2 MG: 2 CAPSULE ORAL at 08:07

## 2019-01-01 RX ADMIN — Medication 4000 MCG: at 10:08

## 2019-01-01 RX ADMIN — OYSTER SHELL CALCIUM WITH VITAMIN D 1 TABLET: 500; 200 TABLET, FILM COATED ORAL at 06:04

## 2019-01-01 RX ADMIN — MIDAZOLAM HYDROCHLORIDE 2 MG: 1 INJECTION, SOLUTION INTRAMUSCULAR; INTRAVENOUS at 07:07

## 2019-01-01 RX ADMIN — CIPROFLOXACIN 400 MG: 2 INJECTION, SOLUTION INTRAVENOUS at 06:07

## 2019-01-01 RX ADMIN — PROPOFOL 50 MCG/KG/MIN: 10 INJECTION, EMULSION INTRAVENOUS at 06:06

## 2019-01-01 RX ADMIN — HYDROMORPHONE HYDROCHLORIDE 1 MG: 1 INJECTION, SOLUTION INTRAMUSCULAR; INTRAVENOUS; SUBCUTANEOUS at 08:07

## 2019-01-01 RX ADMIN — FLUTICASONE PROPIONATE 50 MCG: 50 SPRAY, METERED NASAL at 09:07

## 2019-01-01 RX ADMIN — METHYLPREDNISOLONE SODIUM SUCCINATE: 1 INJECTION, POWDER, LYOPHILIZED, FOR SOLUTION INTRAMUSCULAR; INTRAVENOUS at 08:08

## 2019-01-01 RX ADMIN — CETIRIZINE HYDROCHLORIDE 10 MG: 10 TABLET, FILM COATED ORAL at 05:07

## 2019-01-01 RX ADMIN — INSULIN ASPART 1 UNITS: 100 INJECTION, SOLUTION INTRAVENOUS; SUBCUTANEOUS at 08:06

## 2019-01-01 RX ADMIN — FLUCONAZOLE 400 MG: 2 INJECTION, SOLUTION INTRAVENOUS at 08:06

## 2019-01-01 RX ADMIN — SODIUM CHLORIDE 25 ML/HR: 0.9 INJECTION, SOLUTION INTRAVENOUS at 10:08

## 2019-01-01 RX ADMIN — LEVALBUTEROL 1.25 MG: 1.25 SOLUTION, CONCENTRATE RESPIRATORY (INHALATION) at 10:08

## 2019-01-01 RX ADMIN — LORAZEPAM 2 MG: 2 INJECTION INTRAMUSCULAR; INTRAVENOUS at 03:06

## 2019-01-01 RX ADMIN — DEXMEDETOMIDINE HYDROCHLORIDE 1.4 MCG/KG/HR: 4 INJECTION, SOLUTION INTRAVENOUS at 06:08

## 2019-01-01 RX ADMIN — TOPIRAMATE 25 MG: 25 TABLET, FILM COATED ORAL at 10:08

## 2019-01-01 RX ADMIN — INSULIN ASPART 1 UNITS: 100 INJECTION, SOLUTION INTRAVENOUS; SUBCUTANEOUS at 04:06

## 2019-01-01 RX ADMIN — INSULIN ASPART 3 UNITS: 100 INJECTION, SOLUTION INTRAVENOUS; SUBCUTANEOUS at 12:06

## 2019-01-01 RX ADMIN — VORICONAZOLE 200 MG: 200 TABLET, FILM COATED ORAL at 08:07

## 2019-01-01 RX ADMIN — OXYCODONE HYDROCHLORIDE 10 MG: 10 TABLET ORAL at 05:08

## 2019-01-01 RX ADMIN — TACROLIMUS 0.5 MG: 0.5 CAPSULE ORAL at 08:07

## 2019-01-01 RX ADMIN — MAGNESIUM OXIDE TAB 400 MG (241.3 MG ELEMENTAL MG) 400 MG: 400 (241.3 MG) TAB at 02:08

## 2019-01-01 RX ADMIN — CHLORHEXIDINE GLUCONATE 0.12% ORAL RINSE 10 ML: 1.2 LIQUID ORAL at 09:06

## 2019-01-01 RX ADMIN — PROPOFOL 40 MCG/KG/MIN: 10 INJECTION, EMULSION INTRAVENOUS at 03:08

## 2019-01-01 RX ADMIN — VENLAFAXINE HYDROCHLORIDE 37.5 MG: 37.5 CAPSULE, EXTENDED RELEASE ORAL at 08:04

## 2019-01-01 RX ADMIN — TIZANIDINE 4 MG: 4 TABLET ORAL at 08:08

## 2019-01-01 RX ADMIN — ENOXAPARIN SODIUM 40 MG: 100 INJECTION SUBCUTANEOUS at 05:04

## 2019-01-01 RX ADMIN — PANCRELIPASE 6 CAPSULE: 24000; 76000; 120000 CAPSULE, DELAYED RELEASE PELLETS ORAL at 09:07

## 2019-01-01 RX ADMIN — LORAZEPAM 1 MG: 1 TABLET ORAL at 01:07

## 2019-01-01 RX ADMIN — BUTALBITAL, ACETAMINOPHEN AND CAFFEINE 1 TABLET: 50; 325; 40 TABLET ORAL at 11:04

## 2019-01-01 RX ADMIN — LORAZEPAM 2 MG: 2 INJECTION INTRAMUSCULAR; INTRAVENOUS at 08:06

## 2019-01-01 RX ADMIN — MAGNESIUM SULFATE IN WATER 2 G: 40 INJECTION, SOLUTION INTRAVENOUS at 08:08

## 2019-01-01 RX ADMIN — TACROLIMUS 1 MG: 1 CAPSULE ORAL at 05:07

## 2019-01-01 RX ADMIN — LEVALBUTEROL HYDROCHLORIDE 1.25 MG: 1.25 SOLUTION, CONCENTRATE RESPIRATORY (INHALATION) at 08:08

## 2019-01-01 RX ADMIN — VANCOMYCIN HYDROCHLORIDE 1250 MG: 1.25 INJECTION, POWDER, LYOPHILIZED, FOR SOLUTION INTRAVENOUS at 09:08

## 2019-01-01 RX ADMIN — Medication 100 ML: at 04:06

## 2019-01-01 RX ADMIN — ALTEPLASE 2 MG: 2.2 INJECTION, POWDER, LYOPHILIZED, FOR SOLUTION INTRAVENOUS at 10:06

## 2019-01-01 RX ADMIN — LORAZEPAM 2 MG: 1 TABLET ORAL at 12:04

## 2019-01-01 RX ADMIN — DIAZEPAM 2.5 MG: 5 INJECTION, SOLUTION INTRAMUSCULAR; INTRAVENOUS at 04:06

## 2019-01-01 RX ADMIN — PROPOFOL 100 MCG/KG/MIN: 10 INJECTION, EMULSION INTRAVENOUS at 07:07

## 2019-01-01 RX ADMIN — LEVALBUTEROL HYDROCHLORIDE 1.25 MG: 1.25 SOLUTION, CONCENTRATE RESPIRATORY (INHALATION) at 07:08

## 2019-01-01 RX ADMIN — FLUCONAZOLE 400 MG: 200 TABLET ORAL at 08:07

## 2019-01-01 RX ADMIN — PANTOPRAZOLE SODIUM 40 MG: 40 GRANULE, DELAYED RELEASE ORAL at 08:06

## 2019-01-01 RX ADMIN — DEXTROSE 230 MG: 50 INJECTION, SOLUTION INTRAVENOUS at 09:07

## 2019-01-01 RX ADMIN — HYDROMORPHONE HYDROCHLORIDE 1 MG: 1 INJECTION, SOLUTION INTRAMUSCULAR; INTRAVENOUS; SUBCUTANEOUS at 09:07

## 2019-01-01 RX ADMIN — ENOXAPARIN SODIUM 40 MG: 100 INJECTION SUBCUTANEOUS at 04:08

## 2019-01-01 RX ADMIN — PHENYLEPHRINE HYDROCHLORIDE 4 MCG/KG/MIN: 10 INJECTION INTRAVENOUS at 09:08

## 2019-01-01 RX ADMIN — ASPIRIN 325 MG ORAL TABLET 325 MG: 325 PILL ORAL at 04:06

## 2019-01-01 RX ADMIN — CIPROFLOXACIN 400 MG: 2 INJECTION, SOLUTION INTRAVENOUS at 05:06

## 2019-01-01 RX ADMIN — MAGNESIUM SULFATE IN WATER 2 G: 40 INJECTION, SOLUTION INTRAVENOUS at 12:07

## 2019-01-01 RX ADMIN — MAGNESIUM SULFATE IN WATER 2 G: 40 INJECTION, SOLUTION INTRAVENOUS at 03:06

## 2019-01-01 RX ADMIN — MORPHINE SULFATE 15 MG: 15 TABLET ORAL at 11:07

## 2019-01-01 RX ADMIN — ROPIVACAINE HYDROCHLORIDE: 7.5 INJECTION, SOLUTION EPIDURAL; PERINEURAL at 08:06

## 2019-01-01 RX ADMIN — Medication 10 ML: at 05:06

## 2019-01-01 RX ADMIN — PROTAMINE SULFATE 50 MG: 10 INJECTION, SOLUTION INTRAVENOUS at 05:06

## 2019-01-01 RX ADMIN — DEXTROSE: 50 INJECTION, SOLUTION INTRAVENOUS at 09:06

## 2019-01-01 RX ADMIN — Medication 1 SPRAY: at 03:04

## 2019-01-01 RX ADMIN — Medication 0.02 MCG/KG/MIN: at 01:08

## 2019-01-01 RX ADMIN — Medication 1250 MG: at 04:06

## 2019-01-01 RX ADMIN — ACETYLCYSTEINE 4 ML: 200 INHALANT RESPIRATORY (INHALATION) at 08:07

## 2019-01-01 RX ADMIN — PANTOPRAZOLE SODIUM 40 MG: 40 INJECTION, POWDER, LYOPHILIZED, FOR SOLUTION INTRAVENOUS at 06:09

## 2019-01-01 RX ADMIN — PANCRELIPASE 1 TABLET: 10440; 39150; 39150 TABLET ORAL at 04:08

## 2019-01-01 RX ADMIN — LORAZEPAM 2 MG: 2 INJECTION INTRAMUSCULAR; INTRAVENOUS at 05:06

## 2019-01-01 RX ADMIN — VASOPRESSIN 0.04 UNITS/MIN: 20 INJECTION INTRAVENOUS at 08:08

## 2019-01-01 RX ADMIN — HYDROMORPHONE HYDROCHLORIDE 1 MG: 1 INJECTION, SOLUTION INTRAMUSCULAR; INTRAVENOUS; SUBCUTANEOUS at 03:06

## 2019-01-01 RX ADMIN — PROPOFOL 200 MG: 10 INJECTION, EMULSION INTRAVENOUS at 07:08

## 2019-01-01 RX ADMIN — QUETIAPINE FUMARATE 25 MG: 25 TABLET ORAL at 09:08

## 2019-01-01 RX ADMIN — FLUCONAZOLE 400 MG: 200 TABLET ORAL at 12:07

## 2019-01-01 RX ADMIN — ONDANSETRON 8 MG: 2 INJECTION INTRAMUSCULAR; INTRAVENOUS at 05:06

## 2019-01-01 RX ADMIN — OYSTER SHELL CALCIUM WITH VITAMIN D 1 TABLET: 500; 200 TABLET, FILM COATED ORAL at 05:04

## 2019-01-01 RX ADMIN — NOREPINEPHRINE BITARTRATE 1.3 MCG/KG/MIN: 1 INJECTION, SOLUTION, CONCENTRATE INTRAVENOUS at 09:08

## 2019-01-01 RX ADMIN — GABAPENTIN 300 MG: 300 CAPSULE ORAL at 10:07

## 2019-01-01 RX ADMIN — MAGNESIUM SULFATE IN WATER 2 G: 40 INJECTION, SOLUTION INTRAVENOUS at 09:08

## 2019-01-01 RX ADMIN — HYDROMORPHONE HYDROCHLORIDE 1 MG: 1 INJECTION, SOLUTION INTRAMUSCULAR; INTRAVENOUS; SUBCUTANEOUS at 02:07

## 2019-01-01 RX ADMIN — HYDROMORPHONE HYDROCHLORIDE 2 MG: 2 INJECTION INTRAMUSCULAR; INTRAVENOUS; SUBCUTANEOUS at 07:06

## 2019-01-01 RX ADMIN — MORPHINE SULFATE 15 MG: 15 TABLET ORAL at 12:07

## 2019-01-01 RX ADMIN — LORAZEPAM 2 MG: 2 INJECTION INTRAMUSCULAR; INTRAVENOUS at 07:07

## 2019-01-01 RX ADMIN — MONTELUKAST SODIUM 10 MG: 10 TABLET, FILM COATED ORAL at 09:04

## 2019-01-01 RX ADMIN — VENLAFAXINE HYDROCHLORIDE 37.5 MG: 37.5 CAPSULE, EXTENDED RELEASE ORAL at 09:04

## 2019-01-01 RX ADMIN — OXYCODONE HYDROCHLORIDE 5 MG: 5 TABLET ORAL at 09:08

## 2019-01-01 RX ADMIN — FLUCONAZOLE 400 MG: 200 TABLET ORAL at 05:07

## 2019-01-01 RX ADMIN — LORAZEPAM 2 MG: 2 INJECTION INTRAMUSCULAR; INTRAVENOUS at 12:07

## 2019-01-01 RX ADMIN — ROCURONIUM BROMIDE 50 MG: 10 INJECTION, SOLUTION INTRAVENOUS at 01:04

## 2019-01-01 RX ADMIN — PROPOFOL 50 MCG/KG/MIN: 10 INJECTION, EMULSION INTRAVENOUS at 07:06

## 2019-01-01 RX ADMIN — INSULIN ASPART 2 UNITS: 100 INJECTION, SOLUTION INTRAVENOUS; SUBCUTANEOUS at 11:08

## 2019-01-01 RX ADMIN — HYDROCORTISONE: 10 CREAM TOPICAL at 08:04

## 2019-01-01 RX ADMIN — MIDAZOLAM HYDROCHLORIDE 0.5 MG: 1 INJECTION, SOLUTION INTRAMUSCULAR; INTRAVENOUS at 08:07

## 2019-01-01 RX ADMIN — OXYCODONE HYDROCHLORIDE 10 MG: 10 TABLET ORAL at 06:07

## 2019-01-01 RX ADMIN — SODIUM CHLORIDE 4 ML: 7 NEBU SOLN,3 % NEBU at 09:07

## 2019-01-01 RX ADMIN — ONDANSETRON 8 MG: 2 INJECTION INTRAMUSCULAR; INTRAVENOUS at 11:06

## 2019-01-01 RX ADMIN — PROPOFOL 50 MCG/KG/MIN: 10 INJECTION, EMULSION INTRAVENOUS at 12:06

## 2019-01-01 RX ADMIN — DEXMEDETOMIDINE HYDROCHLORIDE 1 MCG/KG/HR: 100 INJECTION, SOLUTION, CONCENTRATE INTRAVENOUS at 03:06

## 2019-01-01 RX ADMIN — VANCOMYCIN HYDROCHLORIDE 750 MG: 750 INJECTION, POWDER, LYOPHILIZED, FOR SOLUTION INTRAVENOUS at 10:09

## 2019-01-01 RX ADMIN — QUETIAPINE FUMARATE 25 MG: 25 TABLET ORAL at 11:07

## 2019-01-01 RX ADMIN — Medication 0.5 MG: at 09:06

## 2019-01-01 RX ADMIN — LACTULOSE 20 G: 20 SOLUTION ORAL at 10:06

## 2019-01-01 RX ADMIN — VENLAFAXINE HYDROCHLORIDE 150 MG: 150 CAPSULE, EXTENDED RELEASE ORAL at 10:07

## 2019-01-01 RX ADMIN — DIPHENHYDRAMINE HYDROCHLORIDE 50 MG: 50 INJECTION INTRAMUSCULAR; INTRAVENOUS at 08:07

## 2019-01-01 RX ADMIN — INDOMETHACIN 150 MEQ: 25 CAPSULE ORAL at 01:08

## 2019-01-01 RX ADMIN — HEPARIN SODIUM 5000 UNITS: 5000 INJECTION INTRAVENOUS; SUBCUTANEOUS at 08:09

## 2019-01-01 RX ADMIN — LIDOCAINE HYDROCHLORIDE 60 MG: 20 INJECTION, SOLUTION INTRAVENOUS at 01:04

## 2019-01-01 RX ADMIN — OXYCODONE HYDROCHLORIDE 10 MG: 10 TABLET ORAL at 04:04

## 2019-01-01 RX ADMIN — SODIUM CHLORIDE 1 UNITS/HR: 9 INJECTION, SOLUTION INTRAVENOUS at 12:06

## 2019-01-01 RX ADMIN — HEPARIN 500 UNITS: 100 SYRINGE at 12:05

## 2019-01-01 RX ADMIN — VENLAFAXINE HYDROCHLORIDE 150 MG: 37.5 CAPSULE, EXTENDED RELEASE ORAL at 08:04

## 2019-01-01 RX ADMIN — SODIUM CHLORIDE 4 ML: 7 NEBU SOLN,3 % NEBU at 08:07

## 2019-01-01 RX ADMIN — MEROPENEM AND SODIUM CHLORIDE 1 G: 1 INJECTION, SOLUTION INTRAVENOUS at 08:06

## 2019-01-01 RX ADMIN — KETAMINE HYDROCHLORIDE 10 MG: 10 INJECTION, SOLUTION INTRAMUSCULAR; INTRAVENOUS at 09:06

## 2019-01-01 RX ADMIN — OXYCODONE HYDROCHLORIDE 15 MG: 10 TABLET ORAL at 02:07

## 2019-01-01 RX ADMIN — MONTELUKAST SODIUM 10 MG: 10 TABLET, FILM COATED ORAL at 08:04

## 2019-01-01 RX ADMIN — ALTEPLASE 4 MG: 2.2 INJECTION, POWDER, LYOPHILIZED, FOR SOLUTION INTRAVENOUS at 05:09

## 2019-01-01 RX ADMIN — Medication 1 MG: at 09:06

## 2019-01-01 RX ADMIN — HYDROMORPHONE HYDROCHLORIDE 2 MG: 2 INJECTION INTRAMUSCULAR; INTRAVENOUS; SUBCUTANEOUS at 06:06

## 2019-01-01 RX ADMIN — PANCRELIPASE 4 CAPSULE: 24000; 76000; 120000 CAPSULE, DELAYED RELEASE PELLETS ORAL at 11:07

## 2019-01-01 RX ADMIN — SODIUM CHLORIDE, SODIUM GLUCONATE, SODIUM ACETATE, POTASSIUM CHLORIDE, MAGNESIUM CHLORIDE, SODIUM PHOSPHATE, DIBASIC, AND POTASSIUM PHOSPHATE: .53; .5; .37; .037; .03; .012; .00082 INJECTION, SOLUTION INTRAVENOUS at 05:06

## 2019-01-01 RX ADMIN — GANCICLOVIR SODIUM 295 MG: 500 INJECTION, POWDER, LYOPHILIZED, FOR SOLUTION INTRAVENOUS at 06:06

## 2019-01-01 RX ADMIN — CHLORHEXIDINE GLUCONATE 0.12% ORAL RINSE 15 ML: 1.2 LIQUID ORAL at 09:09

## 2019-01-01 RX ADMIN — HYDROMORPHONE HYDROCHLORIDE: 2 INJECTION INTRAMUSCULAR; INTRAVENOUS; SUBCUTANEOUS at 09:06

## 2019-01-01 RX ADMIN — VANCOMYCIN HYDROCHLORIDE 1000 MG: 1 INJECTION, POWDER, LYOPHILIZED, FOR SOLUTION INTRAVENOUS at 11:08

## 2019-01-01 RX ADMIN — MAGNESIUM SULFATE IN WATER 2 G: 40 INJECTION, SOLUTION INTRAVENOUS at 04:06

## 2019-01-01 RX ADMIN — VORICONAZOLE 200 MG: 200 TABLET, FILM COATED ORAL at 09:08

## 2019-01-01 RX ADMIN — INSULIN ASPART 2 UNITS: 100 INJECTION, SOLUTION INTRAVENOUS; SUBCUTANEOUS at 08:07

## 2019-01-01 RX ADMIN — INSULIN ASPART 4 UNITS: 100 INJECTION, SOLUTION INTRAVENOUS; SUBCUTANEOUS at 11:07

## 2019-01-01 RX ADMIN — INSULIN ASPART 4 UNITS: 100 INJECTION, SOLUTION INTRAVENOUS; SUBCUTANEOUS at 08:06

## 2019-01-01 RX ADMIN — SODIUM CHLORIDE 0.4 UNITS/HR: 9 INJECTION, SOLUTION INTRAVENOUS at 09:07

## 2019-01-01 RX ADMIN — GABAPENTIN 300 MG: 300 CAPSULE ORAL at 04:07

## 2019-01-01 RX ADMIN — PANCRELIPASE 1 TABLET: 10440; 39150; 39150 TABLET ORAL at 09:08

## 2019-01-01 RX ADMIN — VASOPRESSIN 0.04 UNITS/MIN: 20 INJECTION INTRAVENOUS at 10:08

## 2019-01-01 RX ADMIN — CALCIUM GLUCONATE 1000 MG: 98 INJECTION, SOLUTION INTRAVENOUS at 07:08

## 2019-01-01 RX ADMIN — SITAGLIPTIN 100 MG: 100 TABLET, FILM COATED ORAL at 02:08

## 2019-01-01 RX ADMIN — DEXMEDETOMIDINE HYDROCHLORIDE 0.4 MCG/KG/HR: 100 INJECTION, SOLUTION, CONCENTRATE INTRAVENOUS at 09:06

## 2019-01-01 RX ADMIN — PROPOFOL 50 MCG/KG/MIN: 10 INJECTION, EMULSION INTRAVENOUS at 11:06

## 2019-01-01 RX ADMIN — HYDROMORPHONE HYDROCHLORIDE 2 MG: 2 INJECTION INTRAMUSCULAR; INTRAVENOUS; SUBCUTANEOUS at 08:06

## 2019-01-01 RX ADMIN — LORAZEPAM 1 MG: 1 TABLET ORAL at 08:07

## 2019-01-01 RX ADMIN — TOBRAMYCIN SULFATE 555 MG: 40 INJECTION, SOLUTION INTRAMUSCULAR; INTRAVENOUS at 03:06

## 2019-01-01 RX ADMIN — HYDROMORPHONE HYDROCHLORIDE: 2 INJECTION INTRAMUSCULAR; INTRAVENOUS; SUBCUTANEOUS at 12:06

## 2019-01-01 RX ADMIN — OXYCODONE HYDROCHLORIDE 10 MG: 10 TABLET ORAL at 02:04

## 2019-01-01 RX ADMIN — LEVALBUTEROL 1.25 MG: 1.25 SOLUTION, CONCENTRATE RESPIRATORY (INHALATION) at 05:06

## 2019-01-01 RX ADMIN — TACROLIMUS 2.5 MG: 1 CAPSULE ORAL at 09:04

## 2019-01-01 RX ADMIN — HYDROXYZINE HYDROCHLORIDE 25 MG: 25 TABLET, FILM COATED ORAL at 03:04

## 2019-01-01 RX ADMIN — EPINEPHRINE 1 MG: 0.1 INJECTION, SOLUTION ENDOTRACHEAL; INTRACARDIAC; INTRAVENOUS at 12:08

## 2019-01-01 RX ADMIN — MYCOPHENOLATE MOFETIL 500 MG: 500 INJECTION, POWDER, LYOPHILIZED, FOR SOLUTION INTRAVENOUS at 08:07

## 2019-01-01 RX ADMIN — CEFTOLOZANE AND TAZOBACTAM 1500 MG: 1; .5 INJECTION, POWDER, LYOPHILIZED, FOR SOLUTION INTRAVENOUS at 11:07

## 2019-01-01 RX ADMIN — DEXMEDETOMIDINE HYDROCHLORIDE 1.4 MCG/KG/HR: 100 INJECTION, SOLUTION, CONCENTRATE INTRAVENOUS at 10:06

## 2019-01-01 RX ADMIN — DEXAMETHASONE SODIUM PHOSPHATE 4 MG: 4 INJECTION, SOLUTION INTRAMUSCULAR; INTRAVENOUS at 01:04

## 2019-01-01 RX ADMIN — MEROPENEM AND SODIUM CHLORIDE 1 G: 1 INJECTION, SOLUTION INTRAVENOUS at 07:06

## 2019-01-01 RX ADMIN — INSULIN ASPART 2 UNITS: 100 INJECTION, SOLUTION INTRAVENOUS; SUBCUTANEOUS at 02:07

## 2019-01-01 RX ADMIN — DEXMEDETOMIDINE HYDROCHLORIDE 1.4 MCG/KG/HR: 4 INJECTION, SOLUTION INTRAVENOUS at 07:08

## 2019-01-01 RX ADMIN — HYDROMORPHONE HYDROCHLORIDE 0.5 MG: 1 INJECTION, SOLUTION INTRAMUSCULAR; INTRAVENOUS; SUBCUTANEOUS at 08:04

## 2019-01-01 RX ADMIN — ANGIOTENSIN II 40 NG/KG/MIN: 2.5 INJECTION INTRAVENOUS at 08:08

## 2019-01-01 RX ADMIN — HYDROMORPHONE HYDROCHLORIDE 2 MG: 1 INJECTION, SOLUTION INTRAMUSCULAR; INTRAVENOUS; SUBCUTANEOUS at 06:07

## 2019-01-01 RX ADMIN — HYDROCORTISONE SODIUM SUCCINATE 50 MG: 100 INJECTION, POWDER, FOR SOLUTION INTRAMUSCULAR; INTRAVENOUS at 12:09

## 2019-01-01 RX ADMIN — SODIUM CHLORIDE 4 ML: 7 NEBU SOLN,3 % NEBU at 11:06

## 2019-01-01 RX ADMIN — Medication 50 MCG/HR: at 03:06

## 2019-01-01 RX ADMIN — PANCRELIPASE 1 TABLET: 20880; 78300; 78300 TABLET ORAL at 04:06

## 2019-01-01 RX ADMIN — Medication 175 MCG/HR: at 09:09

## 2019-01-01 RX ADMIN — Medication 25 MG: at 01:04

## 2019-01-01 RX ADMIN — CEFTOLOZANE AND TAZOBACTAM 1500 MG: 1; .5 INJECTION, POWDER, LYOPHILIZED, FOR SOLUTION INTRAVENOUS at 09:08

## 2019-01-01 RX ADMIN — DEXMEDETOMIDINE HYDROCHLORIDE 1.4 MCG/KG/HR: 100 INJECTION, SOLUTION, CONCENTRATE INTRAVENOUS at 09:06

## 2019-01-01 RX ADMIN — MYCOPHENOLATE MOFETIL 250 MG: 250 CAPSULE ORAL at 10:07

## 2019-01-01 RX ADMIN — OXYCODONE HYDROCHLORIDE 10 MG: 10 TABLET ORAL at 01:08

## 2019-01-01 RX ADMIN — ACETYLCYSTEINE 4 ML: 200 INHALANT RESPIRATORY (INHALATION) at 09:07

## 2019-01-01 RX ADMIN — OXYCODONE HYDROCHLORIDE 10 MG: 10 TABLET ORAL at 01:07

## 2019-01-01 RX ADMIN — Medication 0.5 MG: at 08:06

## 2019-01-01 RX ADMIN — DIPHENHYDRAMINE HYDROCHLORIDE 25 MG: 50 INJECTION, SOLUTION INTRAMUSCULAR; INTRAVENOUS at 11:07

## 2019-01-01 RX ADMIN — HYDROMORPHONE HYDROCHLORIDE 2 MG: 2 INJECTION INTRAMUSCULAR; INTRAVENOUS; SUBCUTANEOUS at 10:06

## 2019-01-01 RX ADMIN — CALCIUM GLUCONATE 2 G: 94 INJECTION, SOLUTION INTRAVENOUS at 10:06

## 2019-01-01 RX ADMIN — CIPROFLOXACIN 400 MG: 2 INJECTION, SOLUTION INTRAVENOUS at 02:07

## 2019-01-01 RX ADMIN — DEXTROSE 230 MG: 50 INJECTION, SOLUTION INTRAVENOUS at 08:07

## 2019-01-01 RX ADMIN — PROMETHAZINE HYDROCHLORIDE 12.5 MG: 25 INJECTION INTRAMUSCULAR; INTRAVENOUS at 03:08

## 2019-01-01 RX ADMIN — Medication 175 MCG/HR: at 01:09

## 2019-01-01 RX ADMIN — TACROLIMUS 1 MG: 1 CAPSULE ORAL at 05:08

## 2019-01-01 RX ADMIN — PROPOFOL 35 MCG/KG/MIN: 10 INJECTION, EMULSION INTRAVENOUS at 07:06

## 2019-01-01 RX ADMIN — LIDOCAINE HYDROCHLORIDE 80 MG: 20 INJECTION, SOLUTION INTRAVENOUS at 07:07

## 2019-01-01 RX ADMIN — ONDANSETRON 4 MG: 2 INJECTION INTRAMUSCULAR; INTRAVENOUS at 09:06

## 2019-01-01 RX ADMIN — OXYCODONE HYDROCHLORIDE 5 MG: 5 TABLET ORAL at 04:06

## 2019-01-01 RX ADMIN — MORPHINE SULFATE 15 MG: 15 TABLET ORAL at 06:07

## 2019-01-01 RX ADMIN — DEXAMETHASONE SODIUM PHOSPHATE: 10 INJECTION, SOLUTION INTRAMUSCULAR; INTRAVENOUS at 08:06

## 2019-01-01 RX ADMIN — ALTEPLASE 4 MG: 2.2 INJECTION, POWDER, LYOPHILIZED, FOR SOLUTION INTRAVENOUS at 11:08

## 2019-01-01 RX ADMIN — PROTAMINE SULFATE 110 MG: 10 INJECTION, SOLUTION INTRAVENOUS at 12:06

## 2019-01-01 RX ADMIN — FOLIC ACID 1000 MCG: 1 TABLET ORAL at 09:04

## 2019-01-01 RX ADMIN — METHYLPREDNISOLONE SODIUM SUCCINATE 500 MG: 40 INJECTION, POWDER, FOR SOLUTION INTRAMUSCULAR; INTRAVENOUS at 05:06

## 2019-01-01 RX ADMIN — CALCIUM GLUCONATE 2 G: 94 INJECTION, SOLUTION INTRAVENOUS at 12:06

## 2019-01-01 RX ADMIN — NOREPINEPHRINE BITARTRATE 1.76 MCG/KG/MIN: 1 INJECTION, SOLUTION, CONCENTRATE INTRAVENOUS at 09:08

## 2019-01-01 RX ADMIN — PANCRELIPASE 4 CAPSULE: 24000; 76000; 120000 CAPSULE, DELAYED RELEASE PELLETS ORAL at 04:07

## 2019-01-01 RX ADMIN — SODIUM CHLORIDE 4 ML: 7 NEBU SOLN,3 % NEBU at 12:07

## 2019-01-01 RX ADMIN — HYDROMORPHONE HYDROCHLORIDE 0.5 MG: 1 INJECTION, SOLUTION INTRAMUSCULAR; INTRAVENOUS; SUBCUTANEOUS at 11:04

## 2019-01-01 RX ADMIN — Medication 300 MCG/HR: at 02:06

## 2019-01-01 RX ADMIN — VASOPRESSIN 0.04 UNITS/MIN: 20 INJECTION INTRAVENOUS at 04:09

## 2019-01-01 RX ADMIN — PROPOFOL 50 MCG/KG/MIN: 10 INJECTION, EMULSION INTRAVENOUS at 09:08

## 2019-01-01 RX ADMIN — Medication 175 MCG/HR: at 04:09

## 2019-01-01 RX ADMIN — Medication 200 MCG/HR: at 03:06

## 2019-01-01 RX ADMIN — METHOCARBAMOL 1000 MG: 100 INJECTION INTRAMUSCULAR; INTRAVENOUS at 09:06

## 2019-01-01 RX ADMIN — OXYCODONE HYDROCHLORIDE 10 MG: 10 TABLET ORAL at 12:04

## 2019-01-01 RX ADMIN — PREDNISONE 15 MG: 5 TABLET ORAL at 08:08

## 2019-01-01 RX ADMIN — LORAZEPAM 1 MG: 1 TABLET ORAL at 10:07

## 2019-01-01 RX ADMIN — LEVALBUTEROL 1.25 MG: 1.25 SOLUTION, CONCENTRATE RESPIRATORY (INHALATION) at 01:04

## 2019-01-01 RX ADMIN — MEROPENEM AND SODIUM CHLORIDE 1 G: 1 INJECTION, SOLUTION INTRAVENOUS at 03:04

## 2019-01-01 RX ADMIN — PANCRELIPASE 4 CAPSULE: 60000; 12000; 38000 CAPSULE, DELAYED RELEASE PELLETS ORAL at 01:04

## 2019-01-01 RX ADMIN — MIDAZOLAM HYDROCHLORIDE 1 MG: 1 INJECTION, SOLUTION INTRAMUSCULAR; INTRAVENOUS at 09:06

## 2019-01-01 RX ADMIN — SODIUM CHLORIDE 500 ML: 0.9 INJECTION, SOLUTION INTRAVENOUS at 08:08

## 2019-01-01 RX ADMIN — TRANEXAMIC ACID 1 MG/KG/HR: 100 INJECTION, SOLUTION INTRAVENOUS at 05:06

## 2019-01-01 RX ADMIN — FENTANYL CITRATE 250 MCG: 50 INJECTION, SOLUTION INTRAMUSCULAR; INTRAVENOUS at 04:06

## 2019-01-01 RX ADMIN — FOLIC ACID 1000 MCG: 1 TABLET ORAL at 08:07

## 2019-01-01 RX ADMIN — LORAZEPAM 2 MG: 2 INJECTION INTRAMUSCULAR; INTRAVENOUS at 11:07

## 2019-01-01 RX ADMIN — HYDROMORPHONE HYDROCHLORIDE 2 MG: 2 INJECTION INTRAMUSCULAR; INTRAVENOUS; SUBCUTANEOUS at 03:06

## 2019-01-01 RX ADMIN — QUETIAPINE FUMARATE 25 MG: 25 TABLET ORAL at 08:07

## 2019-01-01 RX ADMIN — POLYETHYLENE GLYCOL 3350 17 G: 17 POWDER, FOR SOLUTION ORAL at 06:06

## 2019-01-01 RX ADMIN — MIDAZOLAM HYDROCHLORIDE 1 MG: 1 INJECTION, SOLUTION INTRAMUSCULAR; INTRAVENOUS at 12:06

## 2019-01-01 RX ADMIN — LORAZEPAM 2 MG: 2 INJECTION INTRAMUSCULAR; INTRAVENOUS at 01:07

## 2019-01-01 RX ADMIN — FENTANYL CITRATE 150 MCG: 50 INJECTION, SOLUTION INTRAMUSCULAR; INTRAVENOUS at 06:06

## 2019-01-01 RX ADMIN — BISACODYL 10 MG RECTAL SUPPOSITORY 10 MG: at 08:06

## 2019-01-01 RX ADMIN — METHYLPREDNISOLONE SODIUM SUCCINATE 20 MG: 40 INJECTION, POWDER, FOR SOLUTION INTRAMUSCULAR; INTRAVENOUS at 08:07

## 2019-01-01 RX ADMIN — VANCOMYCIN HYDROCHLORIDE 750 MG: 750 INJECTION, POWDER, LYOPHILIZED, FOR SOLUTION INTRAVENOUS at 07:04

## 2019-01-01 RX ADMIN — PANCRELIPASE 3 CAPSULE: 24000; 76000; 120000 CAPSULE, DELAYED RELEASE PELLETS ORAL at 05:07

## 2019-01-01 RX ADMIN — Medication 250 MCG/HR: at 11:06

## 2019-01-01 RX ADMIN — HEPARIN SODIUM (PORCINE) LOCK FLUSH IV SOLN 100 UNIT/ML 100 UNITS: 100 SOLUTION at 12:04

## 2019-01-01 RX ADMIN — CIPROFLOXACIN 400 MG: 2 INJECTION, SOLUTION INTRAVENOUS at 10:07

## 2019-01-01 RX ADMIN — CEFTOLOZANE AND TAZOBACTAM 1500 MG: 1; .5 INJECTION, POWDER, LYOPHILIZED, FOR SOLUTION INTRAVENOUS at 10:07

## 2019-01-01 RX ADMIN — OYSTER SHELL CALCIUM WITH VITAMIN D 1 TABLET: 500; 200 TABLET, FILM COATED ORAL at 08:04

## 2019-01-01 RX ADMIN — TRANEXAMIC ACID 600 MG: 100 INJECTION, SOLUTION INTRAVENOUS at 05:06

## 2019-01-01 RX ADMIN — ASPIRIN 325 MG ORAL TABLET 325 MG: 325 PILL ORAL at 03:06

## 2019-01-01 RX ADMIN — VANCOMYCIN HYDROCHLORIDE 1000 MG: 1 INJECTION, POWDER, LYOPHILIZED, FOR SOLUTION INTRAVENOUS at 05:04

## 2019-01-01 RX ADMIN — INSULIN ASPART 3 UNITS: 100 INJECTION, SOLUTION INTRAVENOUS; SUBCUTANEOUS at 01:07

## 2019-01-01 RX ADMIN — MICAFUNGIN SODIUM 100 MG: 20 INJECTION, POWDER, LYOPHILIZED, FOR SOLUTION INTRAVENOUS at 12:09

## 2019-01-01 RX ADMIN — GABAPENTIN 250 MG: 250 SOLUTION ORAL at 06:06

## 2019-01-01 RX ADMIN — HYDROMORPHONE HYDROCHLORIDE 1 MG: 1 INJECTION, SOLUTION INTRAMUSCULAR; INTRAVENOUS; SUBCUTANEOUS at 10:06

## 2019-01-01 RX ADMIN — LACTULOSE 15 G: 20 SOLUTION ORAL at 08:06

## 2019-01-01 RX ADMIN — INSULIN ASPART 4 UNITS: 100 INJECTION, SOLUTION INTRAVENOUS; SUBCUTANEOUS at 12:07

## 2019-01-01 RX ADMIN — CALCIUM GLUCONATE 1000 MG: 98 INJECTION, SOLUTION INTRAVENOUS at 07:06

## 2019-01-01 RX ADMIN — NOREPINEPHRINE BITARTRATE 0.6 MCG/KG/MIN: 1 INJECTION, SOLUTION, CONCENTRATE INTRAVENOUS at 08:09

## 2019-01-01 RX ADMIN — PREDNISONE 35 MG: 5 TABLET ORAL at 08:06

## 2019-01-01 RX ADMIN — CEFTOLOZANE AND TAZOBACTAM 1500 MG: 1; .5 INJECTION, POWDER, LYOPHILIZED, FOR SOLUTION INTRAVENOUS at 10:08

## 2019-01-01 RX ADMIN — Medication 300 MCG/HR: at 04:06

## 2019-01-01 RX ADMIN — METOPROLOL TARTRATE 5 MG: 1 INJECTION, SOLUTION INTRAVENOUS at 05:06

## 2019-01-01 RX ADMIN — TACROLIMUS 1 MG: 1 CAPSULE ORAL at 08:08

## 2019-01-01 RX ADMIN — CEFEPIME 2 G: 2 INJECTION, POWDER, FOR SOLUTION INTRAVENOUS at 09:04

## 2019-01-01 RX ADMIN — INSULIN ASPART 3 UNITS: 100 INJECTION, SOLUTION INTRAVENOUS; SUBCUTANEOUS at 09:07

## 2019-01-01 RX ADMIN — LIDOCAINE HYDROCHLORIDE 100 MG: 20 INJECTION, SOLUTION INTRAVENOUS at 06:06

## 2019-01-01 RX ADMIN — VASOPRESSIN: 20 INJECTION INTRAVENOUS at 06:08

## 2019-01-01 RX ADMIN — DEXMEDETOMIDINE HYDROCHLORIDE 0.6 MCG/KG/HR: 100 INJECTION, SOLUTION, CONCENTRATE INTRAVENOUS at 05:06

## 2019-01-01 RX ADMIN — PROPOFOL 50 MCG/KG/MIN: 10 INJECTION, EMULSION INTRAVENOUS at 02:06

## 2019-01-01 RX ADMIN — HYDROMORPHONE HYDROCHLORIDE 1 MG: 1 INJECTION, SOLUTION INTRAMUSCULAR; INTRAVENOUS; SUBCUTANEOUS at 05:06

## 2019-01-01 RX ADMIN — METHOCARBAMOL 1000 MG: 100 INJECTION INTRAMUSCULAR; INTRAVENOUS at 04:06

## 2019-01-01 RX ADMIN — FENTANYL CITRATE 100 MCG: 50 INJECTION, SOLUTION INTRAMUSCULAR; INTRAVENOUS at 06:06

## 2019-01-01 RX ADMIN — SODIUM CHLORIDE: 0.9 INJECTION, SOLUTION INTRAVENOUS at 05:08

## 2019-01-01 RX ADMIN — LOPERAMIDE HYDROCHLORIDE 2 MG: 2 CAPSULE ORAL at 09:07

## 2019-01-01 RX ADMIN — CIPROFLOXACIN 400 MG: 2 INJECTION, SOLUTION INTRAVENOUS at 01:07

## 2019-01-01 RX ADMIN — MULTIPLE VITAMINS W/ MINERALS TAB 1 TABLET: TAB at 10:04

## 2019-01-01 RX ADMIN — OXYCODONE HYDROCHLORIDE 10 MG: 10 TABLET ORAL at 08:04

## 2019-01-01 RX ADMIN — MICAFUNGIN SODIUM 100 MG: 20 INJECTION, POWDER, LYOPHILIZED, FOR SOLUTION INTRAVENOUS at 01:07

## 2019-01-01 RX ADMIN — MICAFUNGIN SODIUM 100 MG: 20 INJECTION, POWDER, LYOPHILIZED, FOR SOLUTION INTRAVENOUS at 12:08

## 2019-01-01 RX ADMIN — ROCURONIUM BROMIDE 20 MG: 10 INJECTION, SOLUTION INTRAVENOUS at 06:06

## 2019-01-01 RX ADMIN — GABAPENTIN 300 MG: 300 CAPSULE ORAL at 01:04

## 2019-01-01 RX ADMIN — VENLAFAXINE HYDROCHLORIDE 37.5 MG: 37.5 CAPSULE, EXTENDED RELEASE ORAL at 08:07

## 2019-01-01 RX ADMIN — MIDAZOLAM HYDROCHLORIDE 1 MG: 1 INJECTION, SOLUTION INTRAMUSCULAR; INTRAVENOUS at 07:06

## 2019-01-01 RX ADMIN — LEVALBUTEROL 1.25 MG: 1.25 SOLUTION, CONCENTRATE RESPIRATORY (INHALATION) at 07:04

## 2019-01-01 RX ADMIN — FENTANYL CITRATE 75 MCG: 50 INJECTION, SOLUTION INTRAMUSCULAR; INTRAVENOUS at 07:08

## 2019-01-01 RX ADMIN — SODIUM CHLORIDE 4 ML: 7 NEBU SOLN,3 % NEBU at 07:07

## 2019-01-01 RX ADMIN — LORAZEPAM 2 MG: 1 TABLET ORAL at 10:04

## 2019-01-01 RX ADMIN — Medication 1 MG: at 06:08

## 2019-01-01 RX ADMIN — INSULIN ASPART 4 UNITS: 100 INJECTION, SOLUTION INTRAVENOUS; SUBCUTANEOUS at 06:07

## 2019-01-01 RX ADMIN — HYDROMORPHONE HYDROCHLORIDE 1 MG: 1 INJECTION, SOLUTION INTRAMUSCULAR; INTRAVENOUS; SUBCUTANEOUS at 04:06

## 2019-01-01 RX ADMIN — SODIUM PHOSPHATE, MONOBASIC, MONOHYDRATE 30 MMOL: 276; 142 INJECTION, SOLUTION INTRAVENOUS at 12:09

## 2019-01-01 RX ADMIN — Medication 400 MG: at 10:04

## 2019-01-01 RX ADMIN — LORAZEPAM 1 MG: 1 TABLET ORAL at 11:07

## 2019-01-01 RX ADMIN — MYCOPHENOLATE MOFETIL 250 MG: 250 CAPSULE ORAL at 09:07

## 2019-01-01 RX ADMIN — INSULIN ASPART 6 UNITS: 100 INJECTION, SOLUTION INTRAVENOUS; SUBCUTANEOUS at 03:06

## 2019-01-01 RX ADMIN — HYDROMORPHONE HYDROCHLORIDE: 2 INJECTION INTRAMUSCULAR; INTRAVENOUS; SUBCUTANEOUS at 03:06

## 2019-01-01 RX ADMIN — DEXMEDETOMIDINE HYDROCHLORIDE 1.4 MCG/KG/HR: 100 INJECTION, SOLUTION, CONCENTRATE INTRAVENOUS at 08:06

## 2019-01-01 RX ADMIN — VANCOMYCIN HYDROCHLORIDE 750 MG: 750 INJECTION, POWDER, LYOPHILIZED, FOR SOLUTION INTRAVENOUS at 08:04

## 2019-01-01 RX ADMIN — LORAZEPAM 2 MG: 1 TABLET ORAL at 01:04

## 2019-01-01 RX ADMIN — INSULIN ASPART 6 UNITS: 100 INJECTION, SOLUTION INTRAVENOUS; SUBCUTANEOUS at 05:07

## 2019-01-01 RX ADMIN — ONDANSETRON 8 MG: 2 INJECTION INTRAMUSCULAR; INTRAVENOUS at 04:07

## 2019-01-01 RX ADMIN — TOPIRAMATE 25 MG: 25 TABLET, FILM COATED ORAL at 08:08

## 2019-01-01 RX ADMIN — QUETIAPINE FUMARATE 25 MG: 25 TABLET ORAL at 08:08

## 2019-01-01 RX ADMIN — CEFEPIME 2 G: 2 INJECTION, POWDER, FOR SOLUTION INTRAVENOUS at 12:04

## 2019-01-01 RX ADMIN — CEFTOLOZANE AND TAZOBACTAM 1500 MG: 1; .5 INJECTION, POWDER, LYOPHILIZED, FOR SOLUTION INTRAVENOUS at 01:08

## 2019-01-01 RX ADMIN — OYSTER SHELL CALCIUM WITH VITAMIN D 1 TABLET: 500; 200 TABLET, FILM COATED ORAL at 09:07

## 2019-01-01 RX ADMIN — DIPHENHYDRAMINE HYDROCHLORIDE 50 MG: 50 INJECTION, SOLUTION INTRAMUSCULAR; INTRAVENOUS at 05:04

## 2019-01-01 RX ADMIN — Medication: at 06:06

## 2019-01-01 RX ADMIN — OMEPRAZOLE 40 MG: 40 CAPSULE, DELAYED RELEASE ORAL at 09:04

## 2019-01-01 RX ADMIN — MAGNESIUM SULFATE IN WATER 2 G: 40 INJECTION, SOLUTION INTRAVENOUS at 03:08

## 2019-01-01 RX ADMIN — SITAGLIPTIN 100 MG: 100 TABLET, FILM COATED ORAL at 08:08

## 2019-01-01 RX ADMIN — ROCURONIUM BROMIDE 20 MG: 10 INJECTION, SOLUTION INTRAVENOUS at 12:06

## 2019-01-01 RX ADMIN — VORICONAZOLE 200 MG: 200 TABLET, FILM COATED ORAL at 10:08

## 2019-01-01 RX ADMIN — DEXTROSE 200 MG: 50 INJECTION, SOLUTION INTRAVENOUS at 11:08

## 2019-01-01 RX ADMIN — FAMOTIDINE 20 MG: 20 TABLET, FILM COATED ORAL at 11:07

## 2019-01-01 RX ADMIN — VORICONAZOLE 200 MG: 200 TABLET, FILM COATED ORAL at 11:08

## 2019-01-01 RX ADMIN — LEVALBUTEROL 1.25 MG: 1.25 SOLUTION, CONCENTRATE RESPIRATORY (INHALATION) at 01:07

## 2019-01-01 RX ADMIN — GANCICLOVIR SODIUM 295 MG: 500 INJECTION, POWDER, LYOPHILIZED, FOR SOLUTION INTRAVENOUS at 09:07

## 2019-01-01 RX ADMIN — PREDNISONE 25 MG: 5 TABLET ORAL at 08:06

## 2019-01-01 RX ADMIN — DIPHENHYDRAMINE HYDROCHLORIDE 25 MG: 25 CAPSULE ORAL at 02:04

## 2019-01-01 RX ADMIN — SODIUM CHLORIDE 500 ML: 0.9 INJECTION, SOLUTION INTRAVENOUS at 10:08

## 2019-01-01 RX ADMIN — FLUTICASONE PROPIONATE 100 MCG: 50 SPRAY, METERED NASAL at 08:04

## 2019-01-01 RX ADMIN — VENLAFAXINE HYDROCHLORIDE 150 MG: 150 CAPSULE, EXTENDED RELEASE ORAL at 08:07

## 2019-01-01 RX ADMIN — MAGNESIUM SULFATE IN WATER 2 G: 40 INJECTION, SOLUTION INTRAVENOUS at 06:08

## 2019-01-01 RX ADMIN — CEFEPIME 2 G: 2 INJECTION, POWDER, FOR SOLUTION INTRAVENOUS at 07:04

## 2019-01-01 RX ADMIN — SODIUM CHLORIDE 4 ML: 7 NEBU SOLN,3 % NEBU at 08:06

## 2019-01-01 RX ADMIN — ACETAMINOPHEN 650 MG: 325 TABLET ORAL at 11:07

## 2019-01-01 RX ADMIN — ACETAMINOPHEN 1000 MG: 10 INJECTION, SOLUTION INTRAVENOUS at 02:06

## 2019-01-01 RX ADMIN — CEFEPIME 2 G: 2 INJECTION, POWDER, FOR SOLUTION INTRAVENOUS at 02:04

## 2019-01-01 RX ADMIN — BUTALBITAL, ACETAMINOPHEN AND CAFFEINE 1 TABLET: 50; 325; 40 TABLET ORAL at 02:04

## 2019-01-01 RX ADMIN — CALCIUM GLUCONATE 1000 MG: 98 INJECTION, SOLUTION INTRAVENOUS at 06:08

## 2019-01-01 RX ADMIN — HYDROMORPHONE HYDROCHLORIDE: 2 INJECTION INTRAMUSCULAR; INTRAVENOUS; SUBCUTANEOUS at 08:06

## 2019-01-01 RX ADMIN — DIPHENHYDRAMINE HYDROCHLORIDE 50 MG: 50 INJECTION, SOLUTION INTRAMUSCULAR; INTRAVENOUS at 05:07

## 2019-01-01 RX ADMIN — FENTANYL CITRATE 50 MCG: 50 INJECTION, SOLUTION INTRAMUSCULAR; INTRAVENOUS at 01:04

## 2019-01-01 RX ADMIN — DIPHENHYDRAMINE HYDROCHLORIDE 50 MG: 50 INJECTION, SOLUTION INTRAMUSCULAR; INTRAVENOUS at 07:04

## 2019-01-01 RX ADMIN — FLUCONAZOLE 400 MG: 2 INJECTION, SOLUTION INTRAVENOUS at 09:06

## 2019-01-01 RX ADMIN — VENLAFAXINE HYDROCHLORIDE 37.5 MG: 37.5 CAPSULE, EXTENDED RELEASE ORAL at 09:07

## 2019-01-01 RX ADMIN — FAMOTIDINE 20 MG: 20 TABLET, FILM COATED ORAL at 10:07

## 2019-01-01 RX ADMIN — PANCRELIPASE 4 CAPSULE: 24000; 76000; 120000 CAPSULE, DELAYED RELEASE PELLETS ORAL at 06:07

## 2019-01-01 RX ADMIN — SODIUM CHLORIDE 0.4 UNITS/HR: 9 INJECTION, SOLUTION INTRAVENOUS at 01:06

## 2019-01-01 RX ADMIN — OXYCODONE HYDROCHLORIDE 5 MG: 5 TABLET ORAL at 06:07

## 2019-01-01 RX ADMIN — INSULIN ASPART 1 UNITS: 100 INJECTION, SOLUTION INTRAVENOUS; SUBCUTANEOUS at 12:09

## 2019-01-01 RX ADMIN — GABAPENTIN 300 MG: 300 CAPSULE ORAL at 04:04

## 2019-01-01 RX ADMIN — MICAFUNGIN SODIUM 100 MG: 20 INJECTION, POWDER, LYOPHILIZED, FOR SOLUTION INTRAVENOUS at 11:07

## 2019-01-01 RX ADMIN — METOPROLOL TARTRATE 25 MG: 25 TABLET, FILM COATED ORAL at 04:07

## 2019-01-01 RX ADMIN — VENLAFAXINE HYDROCHLORIDE 150 MG: 37.5 CAPSULE, EXTENDED RELEASE ORAL at 08:07

## 2019-01-01 RX ADMIN — TACROLIMUS 0.5 MG: 0.5 CAPSULE ORAL at 09:07

## 2019-01-01 RX ADMIN — VORICONAZOLE 200 MG: 200 TABLET, FILM COATED ORAL at 08:08

## 2019-01-01 RX ADMIN — METOPROLOL TARTRATE 5 MG: 1 INJECTION, SOLUTION INTRAVENOUS at 01:07

## 2019-01-01 RX ADMIN — METOPROLOL TARTRATE 5 MG: 5 INJECTION INTRAVENOUS at 01:08

## 2019-01-01 RX ADMIN — SODIUM CHLORIDE: 0.9 INJECTION, SOLUTION INTRAVENOUS at 08:09

## 2019-01-01 RX ADMIN — DEXTROSE 20 MG: 50 INJECTION, SOLUTION INTRAVENOUS at 04:06

## 2019-01-01 RX ADMIN — CEFTOLOZANE AND TAZOBACTAM 1500 MG: 1; .5 INJECTION, POWDER, LYOPHILIZED, FOR SOLUTION INTRAVENOUS at 06:07

## 2019-01-01 RX ADMIN — VALGANCICLOVIR 450 MG: 450 TABLET, FILM COATED ORAL at 02:08

## 2019-01-01 RX ADMIN — LORAZEPAM 2 MG: 2 INJECTION INTRAMUSCULAR; INTRAVENOUS at 09:06

## 2019-01-01 RX ADMIN — VENLAFAXINE HYDROCHLORIDE 150 MG: 37.5 CAPSULE, EXTENDED RELEASE ORAL at 09:04

## 2019-01-01 RX ADMIN — GABAPENTIN 300 MG: 300 CAPSULE ORAL at 12:07

## 2019-01-01 RX ADMIN — POTASSIUM CHLORIDE 20 MEQ: 200 INJECTION, SOLUTION INTRAVENOUS at 06:06

## 2019-01-01 RX ADMIN — PANTOPRAZOLE SODIUM 40 MG: 40 TABLET, DELAYED RELEASE ORAL at 08:07

## 2019-01-01 RX ADMIN — NOREPINEPHRINE BITARTRATE 2.8 MCG/KG/MIN: 1 INJECTION, SOLUTION, CONCENTRATE INTRAVENOUS at 05:08

## 2019-01-01 RX ADMIN — DIPHENHYDRAMINE HYDROCHLORIDE 50 MG: 50 INJECTION, SOLUTION INTRAMUSCULAR; INTRAVENOUS at 12:06

## 2019-01-01 RX ADMIN — TACROLIMUS 1 MG: 1 CAPSULE ORAL at 04:08

## 2019-01-01 RX ADMIN — HYDROMORPHONE HYDROCHLORIDE 1 MG: 1 INJECTION, SOLUTION INTRAMUSCULAR; INTRAVENOUS; SUBCUTANEOUS at 10:07

## 2019-01-01 RX ADMIN — CIPROFLOXACIN 400 MG: 2 INJECTION, SOLUTION INTRAVENOUS at 06:06

## 2019-01-01 RX ADMIN — NOREPINEPHRINE BITARTRATE 2.4 MCG/KG/MIN: 1 INJECTION, SOLUTION, CONCENTRATE INTRAVENOUS at 10:08

## 2019-01-01 RX ADMIN — PHENYLEPHRINE HYDROCHLORIDE 3.5 MCG/KG/MIN: 10 INJECTION INTRAVENOUS at 07:08

## 2019-01-01 RX ADMIN — TACROLIMUS 1.5 MG: 1 CAPSULE ORAL at 08:06

## 2019-01-01 RX ADMIN — CEFTOLOZANE AND TAZOBACTAM 1500 MG: 1; .5 INJECTION, POWDER, LYOPHILIZED, FOR SOLUTION INTRAVENOUS at 07:07

## 2019-01-01 RX ADMIN — DIPHENHYDRAMINE HYDROCHLORIDE 25 MG: 50 INJECTION, SOLUTION INTRAMUSCULAR; INTRAVENOUS at 10:07

## 2019-01-01 RX ADMIN — LORAZEPAM 2 MG: 2 INJECTION INTRAMUSCULAR; INTRAVENOUS at 03:07

## 2019-01-01 RX ADMIN — METHOCARBAMOL 1000 MG: 100 INJECTION INTRAMUSCULAR; INTRAVENOUS at 01:06

## 2019-01-01 RX ADMIN — PROMETHAZINE HYDROCHLORIDE 6.25 MG: 25 INJECTION INTRAMUSCULAR; INTRAVENOUS at 08:07

## 2019-01-01 RX ADMIN — SODIUM CHLORIDE: 0.9 INJECTION, SOLUTION INTRAVENOUS at 05:09

## 2019-01-01 RX ADMIN — VASOPRESSIN 0.04 UNITS/MIN: 20 INJECTION INTRAVENOUS at 08:09

## 2019-01-01 RX ADMIN — SODIUM CHLORIDE 4 ML: 7 NEBU SOLN,3 % NEBU at 12:06

## 2019-01-01 RX ADMIN — CARBOXYMETHYLCELLULOSE SODIUM: 10 GEL OPHTHALMIC at 09:08

## 2019-01-01 RX ADMIN — Medication 250 MCG: at 03:06

## 2019-01-01 RX ADMIN — FENTANYL CITRATE 25 MCG/HR: 50 INJECTION, SOLUTION INTRAMUSCULAR; INTRAVENOUS at 03:06

## 2019-01-01 RX ADMIN — LEVALBUTEROL HYDROCHLORIDE 1.25 MG: 1.25 SOLUTION, CONCENTRATE RESPIRATORY (INHALATION) at 07:09

## 2019-01-01 RX ADMIN — MEROPENEM AND SODIUM CHLORIDE 1 G: 1 INJECTION, SOLUTION INTRAVENOUS at 10:06

## 2019-01-01 RX ADMIN — MIDAZOLAM HYDROCHLORIDE 1 MG: 1 INJECTION, SOLUTION INTRAMUSCULAR; INTRAVENOUS at 02:06

## 2019-01-01 RX ADMIN — INSULIN ASPART 4 UNITS: 100 INJECTION, SOLUTION INTRAVENOUS; SUBCUTANEOUS at 11:08

## 2019-01-01 RX ADMIN — QUETIAPINE FUMARATE 50 MG: 25 TABLET ORAL at 11:04

## 2019-01-01 RX ADMIN — CALCIUM GLUCONATE 1 G: 94 INJECTION, SOLUTION INTRAVENOUS at 01:06

## 2019-01-01 RX ADMIN — LACTULOSE 20 G: 20 SOLUTION ORAL at 03:06

## 2019-01-01 RX ADMIN — Medication: at 01:08

## 2019-01-01 RX ADMIN — FENTANYL CITRATE 50 MCG: 50 INJECTION INTRAMUSCULAR; INTRAVENOUS at 12:08

## 2019-01-01 RX ADMIN — INSULIN ASPART 2 UNITS: 100 INJECTION, SOLUTION INTRAVENOUS; SUBCUTANEOUS at 09:06

## 2019-01-01 RX ADMIN — VANCOMYCIN HYDROCHLORIDE 1500 MG: 1.5 INJECTION, POWDER, LYOPHILIZED, FOR SOLUTION INTRAVENOUS at 10:07

## 2019-01-01 RX ADMIN — MAGNESIUM OXIDE TAB 400 MG (241.3 MG ELEMENTAL MG) 400 MG: 400 (241.3 MG) TAB at 07:08

## 2019-01-01 RX ADMIN — HYDROXYZINE HYDROCHLORIDE 25 MG: 25 TABLET, FILM COATED ORAL at 12:04

## 2019-01-01 RX ADMIN — INSULIN ASPART 4 UNITS: 100 INJECTION, SOLUTION INTRAVENOUS; SUBCUTANEOUS at 06:08

## 2019-01-01 RX ADMIN — DIPHENHYDRAMINE HYDROCHLORIDE 12.5 MG: 50 INJECTION, SOLUTION INTRAMUSCULAR; INTRAVENOUS at 08:08

## 2019-01-01 RX ADMIN — LEVALBUTEROL 1.25 MG: 1.25 SOLUTION, CONCENTRATE RESPIRATORY (INHALATION) at 12:06

## 2019-01-01 RX ADMIN — INSULIN ASPART 2 UNITS: 100 INJECTION, SOLUTION INTRAVENOUS; SUBCUTANEOUS at 07:07

## 2019-01-01 RX ADMIN — INSULIN ASPART 6 UNITS: 100 INJECTION, SOLUTION INTRAVENOUS; SUBCUTANEOUS at 12:07

## 2019-01-01 RX ADMIN — PANCRELIPASE 4 CAPSULE: 60000; 12000; 38000 CAPSULE, DELAYED RELEASE PELLETS ORAL at 09:04

## 2019-01-01 RX ADMIN — ENOXAPARIN SODIUM 40 MG: 100 INJECTION SUBCUTANEOUS at 06:07

## 2019-01-01 RX ADMIN — INDOMETHACIN 100 MEQ: 25 CAPSULE ORAL at 12:08

## 2019-01-01 RX ADMIN — LEVALBUTEROL 1.25 MG: 1.25 SOLUTION, CONCENTRATE RESPIRATORY (INHALATION) at 02:06

## 2019-01-01 RX ADMIN — INSULIN ASPART 6 UNITS: 100 INJECTION, SOLUTION INTRAVENOUS; SUBCUTANEOUS at 08:08

## 2019-01-01 RX ADMIN — OYSTER SHELL CALCIUM WITH VITAMIN D 1 TABLET: 500; 200 TABLET, FILM COATED ORAL at 07:04

## 2019-01-01 RX ADMIN — PROPOFOL 50 MCG/KG/MIN: 10 INJECTION, EMULSION INTRAVENOUS at 01:06

## 2019-01-01 RX ADMIN — ROCURONIUM BROMIDE 50 MG: 10 INJECTION, SOLUTION INTRAVENOUS at 06:08

## 2019-01-01 RX ADMIN — INSULIN ASPART 2 UNITS: 100 INJECTION, SOLUTION INTRAVENOUS; SUBCUTANEOUS at 01:07

## 2019-01-01 RX ADMIN — DEXMEDETOMIDINE HYDROCHLORIDE 1.4 MCG/KG/HR: 4 INJECTION, SOLUTION INTRAVENOUS at 04:08

## 2019-01-01 RX ADMIN — PANCRELIPASE 3 CAPSULE: 24000; 76000; 120000 CAPSULE, DELAYED RELEASE PELLETS ORAL at 07:07

## 2019-01-01 RX ADMIN — MIDAZOLAM HYDROCHLORIDE 1 MG: 1 INJECTION, SOLUTION INTRAMUSCULAR; INTRAVENOUS at 04:06

## 2019-01-01 RX ADMIN — Medication 2500 MCG: at 10:06

## 2019-01-01 RX ADMIN — DIPHENHYDRAMINE HYDROCHLORIDE 25 MG: 50 INJECTION, SOLUTION INTRAMUSCULAR; INTRAVENOUS at 02:07

## 2019-01-01 RX ADMIN — MAGNESIUM SULFATE IN WATER 2 G: 40 INJECTION, SOLUTION INTRAVENOUS at 06:07

## 2019-01-01 RX ADMIN — PROPOFOL 40 MG: 10 INJECTION, EMULSION INTRAVENOUS at 06:06

## 2019-01-01 RX ADMIN — FOLIC ACID 1000 MCG: 1 TABLET ORAL at 10:04

## 2019-01-01 RX ADMIN — VENLAFAXINE HYDROCHLORIDE 37.5 MG: 37.5 CAPSULE, EXTENDED RELEASE ORAL at 12:04

## 2019-01-01 RX ADMIN — ONDANSETRON 4 MG: 2 INJECTION INTRAMUSCULAR; INTRAVENOUS at 08:06

## 2019-01-01 RX ADMIN — CIPROFLOXACIN 400 MG: 2 INJECTION, SOLUTION INTRAVENOUS at 03:06

## 2019-01-01 RX ADMIN — OXYCODONE HYDROCHLORIDE 5 MG: 5 TABLET ORAL at 05:07

## 2019-01-01 RX ADMIN — ACETYLCYSTEINE 4 ML: 200 INHALANT RESPIRATORY (INHALATION) at 07:07

## 2019-01-01 RX ADMIN — INSULIN ASPART 1 UNITS: 100 INJECTION, SOLUTION INTRAVENOUS; SUBCUTANEOUS at 09:07

## 2019-01-01 RX ADMIN — INSULIN ASPART 3 UNITS: 100 INJECTION, SOLUTION INTRAVENOUS; SUBCUTANEOUS at 10:07

## 2019-01-01 RX ADMIN — CEFTOLOZANE AND TAZOBACTAM 1500 MG: 1; .5 INJECTION, POWDER, LYOPHILIZED, FOR SOLUTION INTRAVENOUS at 11:08

## 2019-01-01 RX ADMIN — MONTELUKAST SODIUM 10 MG: 10 TABLET, FILM COATED ORAL at 10:04

## 2019-01-01 RX ADMIN — FAMOTIDINE 20 MG: 10 INJECTION, SOLUTION INTRAVENOUS at 08:06

## 2019-01-01 RX ADMIN — FENTANYL CITRATE 100 MCG: 50 INJECTION, SOLUTION INTRAMUSCULAR; INTRAVENOUS at 01:06

## 2019-01-01 RX ADMIN — HYDROMORPHONE HYDROCHLORIDE 2 MG: 1 INJECTION, SOLUTION INTRAMUSCULAR; INTRAVENOUS; SUBCUTANEOUS at 05:07

## 2019-01-01 RX ADMIN — MEROPENEM AND SODIUM CHLORIDE 1 G: 1 INJECTION, SOLUTION INTRAVENOUS at 09:04

## 2019-01-01 RX ADMIN — MORPHINE SULFATE 3 MG: 2 INJECTION, SOLUTION INTRAMUSCULAR; INTRAVENOUS at 02:09

## 2019-01-01 RX ADMIN — KETAMINE HYDROCHLORIDE 15 MG: 10 INJECTION, SOLUTION INTRAMUSCULAR; INTRAVENOUS at 06:06

## 2019-01-01 RX ADMIN — FENTANYL CITRATE 100 MCG: 50 INJECTION, SOLUTION INTRAMUSCULAR; INTRAVENOUS at 02:06

## 2019-01-01 RX ADMIN — HYDROMORPHONE HYDROCHLORIDE 2 MG: 2 INJECTION INTRAMUSCULAR; INTRAVENOUS; SUBCUTANEOUS at 01:07

## 2019-01-01 RX ADMIN — FLUCONAZOLE 400 MG: 40 POWDER, FOR SUSPENSION ORAL at 09:06

## 2019-01-01 RX ADMIN — ACETYLCYSTEINE 4 ML: 200 INHALANT RESPIRATORY (INHALATION) at 10:07

## 2019-01-01 RX ADMIN — SODIUM CHLORIDE 500 ML: 0.9 INJECTION, SOLUTION INTRAVENOUS at 02:04

## 2019-01-01 RX ADMIN — GABAPENTIN 300 MG: 300 CAPSULE ORAL at 04:08

## 2019-01-01 RX ADMIN — PROPOFOL 100 MG: 10 INJECTION, EMULSION INTRAVENOUS at 02:07

## 2019-01-01 RX ADMIN — MORPHINE SULFATE 15 MG: 15 TABLET, EXTENDED RELEASE ORAL at 09:07

## 2019-01-01 RX ADMIN — DEXMEDETOMIDINE HYDROCHLORIDE 1 MCG/KG/HR: 100 INJECTION, SOLUTION, CONCENTRATE INTRAVENOUS at 10:06

## 2019-01-01 RX ADMIN — LORAZEPAM 2 MG: 2 INJECTION INTRAMUSCULAR; INTRAVENOUS at 04:07

## 2019-01-01 RX ADMIN — ACETYLCYSTEINE 4 ML: 200 INHALANT RESPIRATORY (INHALATION) at 12:06

## 2019-01-01 RX ADMIN — PHENYLEPHRINE HYDROCHLORIDE 3.5 MCG/KG/MIN: 10 INJECTION INTRAVENOUS at 01:08

## 2019-01-01 RX ADMIN — MAGNESIUM SULFATE IN WATER 2 G: 40 INJECTION, SOLUTION INTRAVENOUS at 10:07

## 2019-01-01 RX ADMIN — PHENYLEPHRINE HYDROCHLORIDE 4 MCG/KG/MIN: 10 INJECTION INTRAVENOUS at 11:08

## 2019-01-01 RX ADMIN — FLUTICASONE PROPIONATE 100 MCG: 50 SPRAY, METERED NASAL at 10:04

## 2019-01-01 RX ADMIN — MYCOPHENOLATE MOFETIL 500 MG: 500 INJECTION, POWDER, LYOPHILIZED, FOR SOLUTION INTRAVENOUS at 06:06

## 2019-01-01 RX ADMIN — PROPOFOL 15 MCG/KG/MIN: 10 INJECTION, EMULSION INTRAVENOUS at 02:06

## 2019-01-01 RX ADMIN — LORAZEPAM 2 MG: 1 TABLET ORAL at 11:04

## 2019-01-01 RX ADMIN — MIDAZOLAM HYDROCHLORIDE 3 MG/HR: 5 INJECTION, SOLUTION INTRAMUSCULAR; INTRAVENOUS at 10:09

## 2019-01-01 RX ADMIN — HYDROMORPHONE HYDROCHLORIDE 2 MG: 2 INJECTION INTRAMUSCULAR; INTRAVENOUS; SUBCUTANEOUS at 09:06

## 2019-01-01 RX ADMIN — OYSTER SHELL CALCIUM WITH VITAMIN D 1 TABLET: 500; 200 TABLET, FILM COATED ORAL at 09:04

## 2019-01-01 RX ADMIN — TOPIRAMATE 25 MG: 25 TABLET, FILM COATED ORAL at 09:08

## 2019-01-01 RX ADMIN — FLUTICASONE PROPIONATE 100 MCG: 50 SPRAY, METERED NASAL at 11:04

## 2019-01-01 RX ADMIN — LOPERAMIDE HYDROCHLORIDE 2 MG: 2 CAPSULE ORAL at 05:07

## 2019-01-01 RX ADMIN — FAMOTIDINE 20 MG: 10 INJECTION INTRAVENOUS at 08:07

## 2019-01-01 RX ADMIN — MIDAZOLAM HYDROCHLORIDE 2 MG: 1 INJECTION, SOLUTION INTRAMUSCULAR; INTRAVENOUS at 04:06

## 2019-01-01 RX ADMIN — MIDAZOLAM HYDROCHLORIDE 2 MG: 1 INJECTION, SOLUTION INTRAMUSCULAR; INTRAVENOUS at 09:06

## 2019-01-01 RX ADMIN — SODIUM CHLORIDE: 0.9 INJECTION, SOLUTION INTRAVENOUS at 09:08

## 2019-01-01 RX ADMIN — OYSTER SHELL CALCIUM WITH VITAMIN D 1 TABLET: 500; 200 TABLET, FILM COATED ORAL at 08:07

## 2019-01-01 RX ADMIN — INSULIN ASPART 4 UNITS: 100 INJECTION, SOLUTION INTRAVENOUS; SUBCUTANEOUS at 02:07

## 2019-01-01 RX ADMIN — SODIUM CHLORIDE 4 ML: 7 NEBU SOLN,3 % NEBU at 10:07

## 2019-01-01 RX ADMIN — DEXMEDETOMIDINE HYDROCHLORIDE 1.4 MCG/KG/HR: 4 INJECTION, SOLUTION INTRAVENOUS at 09:08

## 2019-01-01 RX ADMIN — FUROSEMIDE 40 MG: 10 INJECTION, SOLUTION INTRAVENOUS at 03:06

## 2019-01-01 RX ADMIN — CIPROFLOXACIN 400 MG: 2 INJECTION, SOLUTION INTRAVENOUS at 11:06

## 2019-01-01 RX ADMIN — PROPOFOL 50 MCG/KG/MIN: 10 INJECTION, EMULSION INTRAVENOUS at 10:06

## 2019-01-01 RX ADMIN — LEVALBUTEROL 1.25 MG: 1.25 SOLUTION, CONCENTRATE RESPIRATORY (INHALATION) at 11:08

## 2019-01-01 RX ADMIN — CEFTOLOZANE AND TAZOBACTAM 1500 MG: 1; .5 INJECTION, POWDER, LYOPHILIZED, FOR SOLUTION INTRAVENOUS at 12:07

## 2019-01-01 RX ADMIN — LACTULOSE 15 G: 20 SOLUTION ORAL at 09:06

## 2019-01-01 RX ADMIN — ROCURONIUM BROMIDE 30 MG: 10 INJECTION, SOLUTION INTRAVENOUS at 07:06

## 2019-01-01 RX ADMIN — HYDROMORPHONE HYDROCHLORIDE 2 MG: 1 INJECTION, SOLUTION INTRAMUSCULAR; INTRAVENOUS; SUBCUTANEOUS at 10:07

## 2019-01-01 RX ADMIN — OXYCODONE HYDROCHLORIDE 10 MG: 10 TABLET ORAL at 03:07

## 2019-01-01 RX ADMIN — HYDROMORPHONE HYDROCHLORIDE 2 MG: 2 INJECTION INTRAMUSCULAR; INTRAVENOUS; SUBCUTANEOUS at 03:07

## 2019-01-01 RX ADMIN — GABAPENTIN 250 MG: 250 SOLUTION ORAL at 05:06

## 2019-01-01 RX ADMIN — DAPSONE 100 MG: 100 TABLET ORAL at 09:06

## 2019-01-01 RX ADMIN — PHENYLEPHRINE HYDROCHLORIDE 4.5 MCG/KG/MIN: 10 INJECTION INTRAVENOUS at 06:09

## 2019-01-01 RX ADMIN — LORAZEPAM 2 MG: 1 TABLET ORAL at 04:07

## 2019-01-01 RX ADMIN — CEFTOLOZANE AND TAZOBACTAM 1500 MG: 1; .5 INJECTION, POWDER, LYOPHILIZED, FOR SOLUTION INTRAVENOUS at 02:09

## 2019-01-01 RX ADMIN — ALTEPLASE 2 MG: 2.2 INJECTION, POWDER, LYOPHILIZED, FOR SOLUTION INTRAVENOUS at 11:08

## 2019-01-01 RX ADMIN — NICARDIPINE HYDROCHLORIDE 2.5 MG/HR: 0.2 INJECTION, SOLUTION INTRAVENOUS at 01:06

## 2019-01-01 RX ADMIN — PROMETHAZINE HYDROCHLORIDE 25 MG: 25 TABLET ORAL at 08:04

## 2019-01-01 RX ADMIN — Medication 150 MCG/HR: at 06:06

## 2019-01-01 RX ADMIN — PANCRELIPASE 6 CAPSULE: 24000; 76000; 120000 CAPSULE, DELAYED RELEASE PELLETS ORAL at 07:07

## 2019-01-01 RX ADMIN — PHENYLEPHRINE HYDROCHLORIDE 3.5 MCG/KG/MIN: 10 INJECTION INTRAVENOUS at 03:08

## 2019-01-01 RX ADMIN — PANCRELIPASE 1 TABLET: 10440; 39150; 39150 TABLET ORAL at 03:08

## 2019-01-01 RX ADMIN — TACROLIMUS 0.5 MG: 0.5 CAPSULE ORAL at 07:07

## 2019-01-01 RX ADMIN — POLYETHYLENE GLYCOL 3350 17 G: 17 POWDER, FOR SOLUTION ORAL at 08:04

## 2019-01-01 RX ADMIN — INSULIN ASPART 4 UNITS: 100 INJECTION, SOLUTION INTRAVENOUS; SUBCUTANEOUS at 01:06

## 2019-01-01 RX ADMIN — HYDROMORPHONE HYDROCHLORIDE 0.75 MG: 1 INJECTION, SOLUTION INTRAMUSCULAR; INTRAVENOUS; SUBCUTANEOUS at 07:07

## 2019-01-01 RX ADMIN — SODIUM BICARBONATE 150 MEQ: 84 INJECTION, SOLUTION INTRAVENOUS at 01:08

## 2019-01-01 RX ADMIN — CHLORHEXIDINE GLUCONATE 0.12% ORAL RINSE 15 ML: 1.2 LIQUID ORAL at 08:09

## 2019-01-01 RX ADMIN — HYDROMORPHONE HYDROCHLORIDE: 2 INJECTION, SOLUTION INTRAMUSCULAR; INTRAVENOUS; SUBCUTANEOUS at 10:06

## 2019-01-01 RX ADMIN — FUROSEMIDE 40 MG: 10 INJECTION, SOLUTION INTRAVENOUS at 05:06

## 2019-01-01 RX ADMIN — PANCRELIPASE 1 TABLET: 10440; 39150; 39150 TABLET ORAL at 12:08

## 2019-01-01 RX ADMIN — CARBOXYMETHYLCELLULOSE SODIUM 9 DROP: 10 GEL OPHTHALMIC at 08:09

## 2019-01-01 RX ADMIN — SODIUM BICARBONATE 50 MEQ: 84 INJECTION, SOLUTION INTRAVENOUS at 01:08

## 2019-01-01 RX ADMIN — ONDANSETRON 8 MG: 4 TABLET, FILM COATED ORAL at 08:04

## 2019-01-01 RX ADMIN — Medication 2 MG: at 07:06

## 2019-01-01 RX ADMIN — HYDROMORPHONE HYDROCHLORIDE 2 MG: 1 INJECTION, SOLUTION INTRAMUSCULAR; INTRAVENOUS; SUBCUTANEOUS at 08:07

## 2019-01-01 RX ADMIN — POTASSIUM CHLORIDE: 14.9 INJECTION, SOLUTION INTRAVENOUS at 12:06

## 2019-01-01 RX ADMIN — INSULIN ASPART 4 UNITS: 100 INJECTION, SOLUTION INTRAVENOUS; SUBCUTANEOUS at 05:08

## 2019-01-01 RX ADMIN — PREDNISONE 20 MG: 10 TABLET ORAL at 05:07

## 2019-01-01 RX ADMIN — CARBOXYMETHYLCELLULOSE SODIUM: 10 GEL OPHTHALMIC at 09:09

## 2019-01-01 RX ADMIN — OXYCODONE HYDROCHLORIDE 10 MG: 10 TABLET ORAL at 08:07

## 2019-01-01 RX ADMIN — MAGNESIUM SULFATE IN WATER 2 G: 40 INJECTION, SOLUTION INTRAVENOUS at 01:07

## 2019-01-01 RX ADMIN — BUTALBITAL, ACETAMINOPHEN AND CAFFEINE 1 TABLET: 50; 325; 40 TABLET ORAL at 10:04

## 2019-01-01 RX ADMIN — ROCURONIUM BROMIDE 10 MG: 10 INJECTION, SOLUTION INTRAVENOUS at 02:04

## 2019-01-01 RX ADMIN — BISACODYL 10 MG RECTAL SUPPOSITORY 10 MG: at 09:06

## 2019-01-01 RX ADMIN — MAGNESIUM SULFATE IN WATER 2 G: 40 INJECTION, SOLUTION INTRAVENOUS at 11:04

## 2019-01-01 RX ADMIN — NOREPINEPHRINE BITARTRATE 0.46 MCG/KG/MIN: 1 INJECTION, SOLUTION, CONCENTRATE INTRAVENOUS at 05:09

## 2019-01-01 RX ADMIN — FLUCONAZOLE 400 MG: 40 POWDER, FOR SUSPENSION ORAL at 10:06

## 2019-01-01 RX ADMIN — QUETIAPINE FUMARATE 50 MG: 25 TABLET ORAL at 07:06

## 2019-01-01 RX ADMIN — PHENYLEPHRINE HYDROCHLORIDE 0.5 MCG/KG/MIN: 10 INJECTION INTRAVENOUS at 11:08

## 2019-01-01 RX ADMIN — SODIUM PHOSPHATE, MONOBASIC, MONOHYDRATE AND SODIUM PHOSPHATE, DIBASIC, ANHYDROUS 30 MMOL: 276; 142 INJECTION, SOLUTION INTRAVENOUS at 08:08

## 2019-01-01 RX ADMIN — SODIUM CHLORIDE 2 UNITS/HR: 9 INJECTION, SOLUTION INTRAVENOUS at 05:08

## 2019-01-01 RX ADMIN — DEXTROSE 750 MG: 5 SOLUTION INTRAVENOUS at 09:07

## 2019-01-01 RX ADMIN — BUTALBITAL, ACETAMINOPHEN AND CAFFEINE 1 TABLET: 50; 325; 40 TABLET ORAL at 05:04

## 2019-01-01 RX ADMIN — FAMOTIDINE 20 MG: 10 INJECTION INTRAVENOUS at 01:06

## 2019-01-01 RX ADMIN — OMEPRAZOLE 40 MG: 40 CAPSULE, DELAYED RELEASE ORAL at 01:04

## 2019-01-01 RX ADMIN — LORAZEPAM 2 MG: 1 TABLET ORAL at 05:07

## 2019-01-01 RX ADMIN — LEVALBUTEROL 1.25 MG: 1.25 SOLUTION, CONCENTRATE RESPIRATORY (INHALATION) at 12:04

## 2019-01-01 RX ADMIN — CARBOXYMETHYLCELLULOSE SODIUM: 10 GEL OPHTHALMIC at 08:09

## 2019-01-01 RX ADMIN — SODIUM CHLORIDE: 9 INJECTION, SOLUTION INTRAVENOUS at 07:08

## 2019-01-01 RX ADMIN — PROPOFOL 100 MG: 10 INJECTION, EMULSION INTRAVENOUS at 07:07

## 2019-01-01 RX ADMIN — DAPSONE 100 MG: 100 TABLET ORAL at 05:07

## 2019-01-01 RX ADMIN — TACROLIMUS 0.5 MG: 0.5 CAPSULE ORAL at 07:06

## 2019-01-01 RX ADMIN — MIDAZOLAM HYDROCHLORIDE 1 MG: 1 INJECTION, SOLUTION INTRAMUSCULAR; INTRAVENOUS at 06:06

## 2019-01-01 RX ADMIN — LEVALBUTEROL HYDROCHLORIDE 1.25 MG: 1.25 SOLUTION, CONCENTRATE RESPIRATORY (INHALATION) at 11:09

## 2019-01-01 RX ADMIN — SODIUM PHOSPHATE, MONOBASIC, MONOHYDRATE 20.01 MMOL: 276; 142 INJECTION, SOLUTION INTRAVENOUS at 09:06

## 2019-01-01 RX ADMIN — TIZANIDINE 4 MG: 4 TABLET ORAL at 05:04

## 2019-01-01 RX ADMIN — VASOPRESSIN 0.04 UNITS/MIN: 20 INJECTION INTRAVENOUS at 11:09

## 2019-01-01 RX ADMIN — HEPARIN SODIUM AND DEXTROSE 300 UNITS/HR: 10000; 5 INJECTION INTRAVENOUS at 11:09

## 2019-01-01 RX ADMIN — CISATRACURIUM BESYLATE 1 MCG/KG/MIN: 10 INJECTION INTRAVENOUS at 07:08

## 2019-01-01 RX ADMIN — DIPHENHYDRAMINE HYDROCHLORIDE 50 MG: 50 INJECTION, SOLUTION INTRAMUSCULAR; INTRAVENOUS at 07:06

## 2019-01-01 RX ADMIN — HYDROMORPHONE HYDROCHLORIDE 2 MG: 2 INJECTION INTRAMUSCULAR; INTRAVENOUS; SUBCUTANEOUS at 04:07

## 2019-01-01 RX ADMIN — Medication 250 MCG/HR: at 04:06

## 2019-01-01 RX ADMIN — NICARDIPINE HYDROCHLORIDE 15 MG/HR: 0.2 INJECTION, SOLUTION INTRAVENOUS at 05:06

## 2019-01-01 RX ADMIN — QUETIAPINE FUMARATE 50 MG: 25 TABLET ORAL at 08:08

## 2019-01-01 RX ADMIN — PROPOFOL 200 MCG/KG/MIN: 10 INJECTION, EMULSION INTRAVENOUS at 11:05

## 2019-01-01 RX ADMIN — ACETAMINOPHEN 650 MG: 325 TABLET ORAL at 02:07

## 2019-01-01 RX ADMIN — DIPHENHYDRAMINE HYDROCHLORIDE 50 MG: 50 INJECTION, SOLUTION INTRAMUSCULAR; INTRAVENOUS at 11:06

## 2019-01-01 RX ADMIN — SODIUM CHLORIDE 1.8 UNITS/HR: 9 INJECTION, SOLUTION INTRAVENOUS at 12:08

## 2019-01-01 RX ADMIN — PANTOPRAZOLE SODIUM 40 MG: 40 GRANULE, DELAYED RELEASE ORAL at 02:06

## 2019-01-01 RX ADMIN — Medication 100 MCG/HR: at 07:08

## 2019-01-01 RX ADMIN — HYDROMORPHONE HYDROCHLORIDE 2 MG: 2 INJECTION INTRAMUSCULAR; INTRAVENOUS; SUBCUTANEOUS at 08:07

## 2019-01-01 RX ADMIN — QUETIAPINE FUMARATE 50 MG: 25 TABLET ORAL at 10:07

## 2019-01-01 RX ADMIN — DEXTROSE 125 ML: 10 SOLUTION INTRAVENOUS at 02:06

## 2019-01-01 RX ADMIN — GANCICLOVIR SODIUM 295 MG: 500 INJECTION, POWDER, LYOPHILIZED, FOR SOLUTION INTRAVENOUS at 01:07

## 2019-01-01 RX ADMIN — PANCRELIPASE 5 CAPSULE: 24000; 76000; 120000 CAPSULE, DELAYED RELEASE PELLETS ORAL at 02:07

## 2019-01-01 RX ADMIN — POTASSIUM CHLORIDE 20 MEQ: 200 INJECTION, SOLUTION INTRAVENOUS at 10:06

## 2019-01-01 RX ADMIN — LIDOCAINE HYDROCHLORIDE 90 MG: 20 INJECTION, SOLUTION INTRAVENOUS at 07:08

## 2019-01-01 RX ADMIN — DEXTROSE 750 MG: 5 SOLUTION INTRAVENOUS at 08:07

## 2019-01-01 RX ADMIN — HEPARIN SODIUM AND DEXTROSE 300 UNITS/HR: 10000; 5 INJECTION INTRAVENOUS at 03:08

## 2019-01-01 RX ADMIN — DEXMEDETOMIDINE HYDROCHLORIDE 0.6 MCG/KG/HR: 4 INJECTION, SOLUTION INTRAVENOUS at 05:08

## 2019-01-01 RX ADMIN — LORAZEPAM 1 MG: 1 TABLET ORAL at 02:07

## 2019-01-01 RX ADMIN — VENLAFAXINE HYDROCHLORIDE 37.5 MG: 37.5 CAPSULE, EXTENDED RELEASE ORAL at 10:04

## 2019-01-01 RX ADMIN — VANCOMYCIN HYDROCHLORIDE 1250 MG: 1.25 INJECTION, POWDER, LYOPHILIZED, FOR SOLUTION INTRAVENOUS at 04:06

## 2019-01-01 RX ADMIN — SODIUM CHLORIDE: 0.9 INJECTION, SOLUTION INTRAVENOUS at 12:04

## 2019-01-01 RX ADMIN — ACETAMINOPHEN 650 MG: 325 TABLET ORAL at 08:07

## 2019-01-01 RX ADMIN — Medication 0.12 MCG/KG/MIN: at 03:08

## 2019-01-01 RX ADMIN — VORICONAZOLE 200 MG: 200 TABLET, FILM COATED ORAL at 10:07

## 2019-01-01 RX ADMIN — Medication 1.5 MG: at 08:06

## 2019-01-01 RX ADMIN — ETOMIDATE 40 MG: 2 INJECTION, SOLUTION INTRAVENOUS at 06:08

## 2019-01-01 RX ADMIN — CIPROFLOXACIN 400 MG: 2 INJECTION, SOLUTION INTRAVENOUS at 08:07

## 2019-01-01 RX ADMIN — QUETIAPINE FUMARATE 50 MG: 25 TABLET ORAL at 09:04

## 2019-01-01 RX ADMIN — CEFTOLOZANE AND TAZOBACTAM 1500 MG: 1; .5 INJECTION, POWDER, LYOPHILIZED, FOR SOLUTION INTRAVENOUS at 05:09

## 2019-01-01 RX ADMIN — BARIUM SULFATE 10 ML: 400 SUSPENSION ORAL at 10:07

## 2019-01-01 RX ADMIN — VASOPRESSIN 0.04 UNITS/MIN: 20 INJECTION INTRAVENOUS at 05:09

## 2019-01-01 RX ADMIN — INSULIN ASPART 1 UNITS: 100 INJECTION, SOLUTION INTRAVENOUS; SUBCUTANEOUS at 11:06

## 2019-01-01 RX ADMIN — METOPROLOL TARTRATE 25 MG: 25 TABLET ORAL at 09:07

## 2019-01-01 RX ADMIN — DIPHENHYDRAMINE HYDROCHLORIDE 50 MG: 50 INJECTION, SOLUTION INTRAMUSCULAR; INTRAVENOUS at 02:06

## 2019-01-01 RX ADMIN — HYDROMORPHONE HYDROCHLORIDE 0.75 MG: 1 INJECTION, SOLUTION INTRAMUSCULAR; INTRAVENOUS; SUBCUTANEOUS at 08:07

## 2019-01-01 RX ADMIN — ONDANSETRON 4 MG: 2 INJECTION INTRAMUSCULAR; INTRAVENOUS at 03:06

## 2019-01-01 RX ADMIN — LORAZEPAM 1 MG: 1 TABLET ORAL at 11:08

## 2019-01-01 RX ADMIN — LORAZEPAM 2 MG: 1 TABLET ORAL at 09:04

## 2019-01-01 RX ADMIN — HYDROMORPHONE HYDROCHLORIDE 2 MG: 1 INJECTION, SOLUTION INTRAMUSCULAR; INTRAVENOUS; SUBCUTANEOUS at 12:07

## 2019-01-01 RX ADMIN — MORPHINE SULFATE 15 MG: 15 TABLET, EXTENDED RELEASE ORAL at 12:04

## 2019-01-01 RX ADMIN — PROPOFOL 45 MCG/KG/MIN: 10 INJECTION, EMULSION INTRAVENOUS at 01:06

## 2019-01-01 RX ADMIN — Medication 2.5 MG: at 06:06

## 2019-01-01 RX ADMIN — NOREPINEPHRINE BITARTRATE 0.52 MCG/KG/MIN: 1 INJECTION, SOLUTION, CONCENTRATE INTRAVENOUS at 05:09

## 2019-01-01 RX ADMIN — METOPROLOL TARTRATE 5 MG: 5 INJECTION INTRAVENOUS at 08:08

## 2019-01-01 RX ADMIN — CEFTOLOZANE AND TAZOBACTAM 1500 MG: 1; .5 INJECTION, POWDER, LYOPHILIZED, FOR SOLUTION INTRAVENOUS at 08:07

## 2019-01-01 RX ADMIN — SODIUM CHLORIDE 4 ML: 7 NEBU SOLN,3 % NEBU at 11:07

## 2019-01-01 RX ADMIN — GABAPENTIN 300 MG: 300 CAPSULE ORAL at 07:08

## 2019-01-01 RX ADMIN — MIDAZOLAM HYDROCHLORIDE 3 MG/HR: 5 INJECTION, SOLUTION INTRAMUSCULAR; INTRAVENOUS at 03:09

## 2019-01-01 RX ADMIN — CALCIUM GLUCONATE 1000 MG: 98 INJECTION, SOLUTION INTRAVENOUS at 06:09

## 2019-01-01 RX ADMIN — CARBOXYMETHYLCELLULOSE SODIUM 9 DROP: 10 GEL OPHTHALMIC at 02:09

## 2019-01-01 RX ADMIN — INSULIN ASPART 8 UNITS: 100 INJECTION, SOLUTION INTRAVENOUS; SUBCUTANEOUS at 05:08

## 2019-01-01 RX ADMIN — MAGNESIUM SULFATE IN WATER 2 G: 40 INJECTION, SOLUTION INTRAVENOUS at 11:06

## 2019-01-01 RX ADMIN — PHENYLEPHRINE HYDROCHLORIDE 4.5 MCG/KG/MIN: 10 INJECTION INTRAVENOUS at 04:09

## 2019-01-01 RX ADMIN — DEXTROSE MONOHYDRATE, SODIUM CHLORIDE, AND POTASSIUM CHLORIDE: 50; 4.5; 1.49 INJECTION, SOLUTION INTRAVENOUS at 01:06

## 2019-01-01 RX ADMIN — OYSTER SHELL CALCIUM WITH VITAMIN D 1 TABLET: 500; 200 TABLET, FILM COATED ORAL at 05:07

## 2019-01-01 RX ADMIN — CEFEPIME 2 G: 2 INJECTION, POWDER, FOR SOLUTION INTRAVENOUS at 10:04

## 2019-01-01 RX ADMIN — TACROLIMUS 0.5 MG: 1 CAPSULE ORAL at 05:04

## 2019-01-01 RX ADMIN — MORPHINE SULFATE 15 MG: 15 TABLET, EXTENDED RELEASE ORAL at 08:08

## 2019-01-01 RX ADMIN — DIPHENHYDRAMINE HYDROCHLORIDE 50 MG: 50 INJECTION, SOLUTION INTRAMUSCULAR; INTRAVENOUS at 11:08

## 2019-01-01 RX ADMIN — CEFEPIME 1 G: 1 INJECTION, POWDER, FOR SOLUTION INTRAMUSCULAR; INTRAVENOUS at 05:07

## 2019-01-01 RX ADMIN — CIPROFLOXACIN 400 MG: 2 INJECTION, SOLUTION INTRAVENOUS at 04:07

## 2019-01-01 RX ADMIN — Medication 300 MCG/HR: at 08:06

## 2019-01-01 RX ADMIN — QUETIAPINE FUMARATE 25 MG: 25 TABLET ORAL at 10:08

## 2019-01-01 RX ADMIN — CARBOXYMETHYLCELLULOSE SODIUM: 10 GEL OPHTHALMIC at 03:09

## 2019-01-01 RX ADMIN — SITAGLIPTIN 100 MG: 50 TABLET, FILM COATED ORAL at 11:07

## 2019-01-01 RX ADMIN — Medication: at 12:06

## 2019-01-01 RX ADMIN — NOREPINEPHRINE BITARTRATE 2 MCG/KG/MIN: 1 INJECTION, SOLUTION, CONCENTRATE INTRAVENOUS at 04:08

## 2019-01-01 RX ADMIN — MYCOPHENOLATE MOFETIL 500 MG: 500 INJECTION, POWDER, LYOPHILIZED, FOR SOLUTION INTRAVENOUS at 12:07

## 2019-01-01 RX ADMIN — HYDROMORPHONE HYDROCHLORIDE 2 MG: 2 INJECTION INTRAMUSCULAR; INTRAVENOUS; SUBCUTANEOUS at 11:06

## 2019-01-01 RX ADMIN — INSULIN ASPART 4 UNITS: 100 INJECTION, SOLUTION INTRAVENOUS; SUBCUTANEOUS at 09:07

## 2019-01-01 RX ADMIN — ENOXAPARIN SODIUM 40 MG: 100 INJECTION SUBCUTANEOUS at 04:07

## 2019-01-01 RX ADMIN — KETAMINE HYDROCHLORIDE 25 MG: 10 INJECTION, SOLUTION INTRAMUSCULAR; INTRAVENOUS at 05:06

## 2019-01-01 RX ADMIN — VANCOMYCIN HYDROCHLORIDE 1000 MG: 1 INJECTION, POWDER, LYOPHILIZED, FOR SOLUTION INTRAVENOUS at 06:04

## 2019-01-01 RX ADMIN — CIPROFLOXACIN 400 MG: 2 INJECTION, SOLUTION INTRAVENOUS at 10:06

## 2019-01-01 RX ADMIN — METOPROLOL TARTRATE 25 MG: 25 TABLET ORAL at 08:07

## 2019-01-01 RX ADMIN — DEXMEDETOMIDINE HYDROCHLORIDE 1.4 MCG/KG/HR: 100 INJECTION, SOLUTION, CONCENTRATE INTRAVENOUS at 02:06

## 2019-01-01 RX ADMIN — Medication 1000 MG: at 05:06

## 2019-01-01 RX ADMIN — SODIUM CHLORIDE, SODIUM GLUCONATE, SODIUM ACETATE, POTASSIUM CHLORIDE, MAGNESIUM CHLORIDE, SODIUM PHOSPHATE, DIBASIC, AND POTASSIUM PHOSPHATE: .53; .5; .37; .037; .03; .012; .00082 INJECTION, SOLUTION INTRAVENOUS at 04:06

## 2019-01-01 RX ADMIN — VALGANCICLOVIR 450 MG: 450 TABLET, FILM COATED ORAL at 08:08

## 2019-01-01 RX ADMIN — DOCUSATE SODIUM 100 MG: 50 LIQUID ORAL at 03:06

## 2019-01-01 RX ADMIN — FENTANYL CITRATE 100 MCG: 50 INJECTION, SOLUTION INTRAMUSCULAR; INTRAVENOUS at 12:06

## 2019-01-01 RX ADMIN — DEXMEDETOMIDINE HYDROCHLORIDE 1.4 MCG/KG/HR: 100 INJECTION, SOLUTION, CONCENTRATE INTRAVENOUS at 05:06

## 2019-01-01 RX ADMIN — PROPOFOL 50 MCG/KG/MIN: 10 INJECTION, EMULSION INTRAVENOUS at 03:08

## 2019-01-01 RX ADMIN — VALGANCICLOVIR 450 MG: 450 TABLET, FILM COATED ORAL at 10:07

## 2019-01-01 RX ADMIN — QUETIAPINE FUMARATE 25 MG: 25 TABLET ORAL at 10:04

## 2019-01-01 RX ADMIN — CEFTOLOZANE AND TAZOBACTAM 1500 MG: 1; .5 INJECTION, POWDER, LYOPHILIZED, FOR SOLUTION INTRAVENOUS at 08:08

## 2019-01-01 RX ADMIN — ACETAMINOPHEN 1000 MG: 10 INJECTION, SOLUTION INTRAVENOUS at 06:06

## 2019-01-01 RX ADMIN — EPINEPHRINE: 1 INJECTION, SOLUTION INTRAMUSCULAR; SUBCUTANEOUS at 08:06

## 2019-01-01 RX ADMIN — FENTANYL CITRATE 25 MCG: 50 INJECTION, SOLUTION INTRAMUSCULAR; INTRAVENOUS at 09:07

## 2019-01-01 RX ADMIN — VANCOMYCIN HYDROCHLORIDE 750 MG: 750 INJECTION, POWDER, LYOPHILIZED, FOR SOLUTION INTRAVENOUS at 09:04

## 2019-01-01 RX ADMIN — CHLORHEXIDINE GLUCONATE 0.12% ORAL RINSE 10 ML: 1.2 LIQUID ORAL at 10:06

## 2019-01-01 RX ADMIN — LORAZEPAM 2 MG: 2 INJECTION INTRAMUSCULAR; INTRAVENOUS at 11:06

## 2019-01-01 RX ADMIN — Medication 2 MG: at 05:06

## 2019-01-01 RX ADMIN — Medication 15 MG: at 11:06

## 2019-01-01 RX ADMIN — TACROLIMUS 0.5 MG: 0.5 CAPSULE ORAL at 05:06

## 2019-01-01 RX ADMIN — PANCRELIPASE 4 CAPSULE: 24000; 76000; 120000 CAPSULE, DELAYED RELEASE PELLETS ORAL at 09:07

## 2019-01-01 RX ADMIN — DEXTROSE 350 MG: 50 INJECTION, SOLUTION INTRAVENOUS at 08:07

## 2019-01-01 RX ADMIN — LACTULOSE 15 G: 20 SOLUTION ORAL at 10:06

## 2019-01-01 RX ADMIN — VALGANCICLOVIR 450 MG: 450 TABLET, FILM COATED ORAL at 07:08

## 2019-01-01 RX ADMIN — MIDAZOLAM HYDROCHLORIDE 0.5 MG: 1 INJECTION, SOLUTION INTRAMUSCULAR; INTRAVENOUS at 09:07

## 2019-01-01 RX ADMIN — INSULIN ASPART 10 UNITS: 100 INJECTION, SOLUTION INTRAVENOUS; SUBCUTANEOUS at 08:08

## 2019-01-01 RX ADMIN — NICARDIPINE HYDROCHLORIDE 2.5 MG/HR: 0.2 INJECTION, SOLUTION INTRAVENOUS at 12:06

## 2019-01-01 RX ADMIN — MIDAZOLAM HYDROCHLORIDE 1 MG: 1 INJECTION, SOLUTION INTRAMUSCULAR; INTRAVENOUS at 08:07

## 2019-01-01 RX ADMIN — MICAFUNGIN SODIUM 100 MG: 20 INJECTION, POWDER, LYOPHILIZED, FOR SOLUTION INTRAVENOUS at 01:09

## 2019-01-01 RX ADMIN — DIPHENHYDRAMINE HYDROCHLORIDE 25 MG: 50 INJECTION, SOLUTION INTRAMUSCULAR; INTRAVENOUS at 09:07

## 2019-01-01 RX ADMIN — PHENYLEPHRINE HYDROCHLORIDE 4 MCG/KG/MIN: 10 INJECTION INTRAVENOUS at 02:09

## 2019-01-01 RX ADMIN — HYDROMORPHONE HYDROCHLORIDE 1 MG: 1 INJECTION, SOLUTION INTRAMUSCULAR; INTRAVENOUS; SUBCUTANEOUS at 12:06

## 2019-01-01 RX ADMIN — MORPHINE SULFATE 15 MG: 15 TABLET ORAL at 07:07

## 2019-01-01 RX ADMIN — SODIUM CHLORIDE 1.4 UNITS/HR: 9 INJECTION, SOLUTION INTRAVENOUS at 12:06

## 2019-01-01 RX ADMIN — LEVALBUTEROL HYDROCHLORIDE 1.25 MG: 1.25 SOLUTION, CONCENTRATE RESPIRATORY (INHALATION) at 08:09

## 2019-01-01 RX ADMIN — LIDOCAINE HYDROCHLORIDE 80 MG: 20 INJECTION, SOLUTION INTRAVENOUS at 04:06

## 2019-01-01 RX ADMIN — Medication 175 MCG/HR: at 07:08

## 2019-01-01 RX ADMIN — TACROLIMUS 0.5 MG: 1 CAPSULE ORAL at 09:04

## 2019-01-01 RX ADMIN — OXYCODONE HYDROCHLORIDE 5 MG: 5 TABLET ORAL at 10:07

## 2019-01-01 RX ADMIN — PROPOFOL 150 MG: 10 INJECTION, EMULSION INTRAVENOUS at 01:04

## 2019-01-01 RX ADMIN — LIDOCAINE HYDROCHLORIDE 40 MG: 20 INJECTION, SOLUTION INTRAVENOUS at 11:05

## 2019-01-01 RX ADMIN — MORPHINE SULFATE 15 MG: 15 TABLET, EXTENDED RELEASE ORAL at 07:08

## 2019-01-01 RX ADMIN — QUETIAPINE FUMARATE 50 MG: 25 TABLET, FILM COATED ORAL at 08:08

## 2019-01-01 RX ADMIN — CEFTOLOZANE AND TAZOBACTAM 1500 MG: 1; .5 INJECTION, POWDER, LYOPHILIZED, FOR SOLUTION INTRAVENOUS at 06:08

## 2019-01-01 RX ADMIN — DEXMEDETOMIDINE HYDROCHLORIDE 0.2 MCG/KG/HR: 4 INJECTION, SOLUTION INTRAVENOUS at 08:08

## 2019-01-01 RX ADMIN — PROPOFOL 50 MCG/KG/MIN: 10 INJECTION, EMULSION INTRAVENOUS at 09:06

## 2019-01-01 RX ADMIN — VORICONAZOLE 200 MG: 200 TABLET, FILM COATED ORAL at 09:07

## 2019-01-01 RX ADMIN — CEFTOLOZANE AND TAZOBACTAM 1500 MG: 1; .5 INJECTION, POWDER, LYOPHILIZED, FOR SOLUTION INTRAVENOUS at 08:09

## 2019-01-01 RX ADMIN — QUETIAPINE FUMARATE 50 MG: 25 TABLET ORAL at 12:04

## 2019-01-01 RX ADMIN — DIPHENHYDRAMINE HYDROCHLORIDE 50 MG: 50 INJECTION, SOLUTION INTRAMUSCULAR; INTRAVENOUS at 12:08

## 2019-01-01 RX ADMIN — NOREPINEPHRINE BITARTRATE 0.6 MCG/KG/MIN: 1 INJECTION, SOLUTION, CONCENTRATE INTRAVENOUS at 03:09

## 2019-01-01 RX ADMIN — DEXMEDETOMIDINE HYDROCHLORIDE 0.8 MCG/KG/HR: 100 INJECTION, SOLUTION, CONCENTRATE INTRAVENOUS at 12:06

## 2019-01-01 RX ADMIN — LEVALBUTEROL 1.25 MG: 1.25 SOLUTION, CONCENTRATE RESPIRATORY (INHALATION) at 09:04

## 2019-01-01 RX ADMIN — ANGIOTENSIN II 10 NG/KG/MIN: 2.5 INJECTION INTRAVENOUS at 11:08

## 2019-01-01 RX ADMIN — DEXMEDETOMIDINE HYDROCHLORIDE 1.4 MCG/KG/HR: 100 INJECTION, SOLUTION, CONCENTRATE INTRAVENOUS at 06:06

## 2019-01-01 RX ADMIN — ACETAMINOPHEN 650 MG: 325 TABLET ORAL at 12:07

## 2019-01-01 RX ADMIN — CEFTOLOZANE AND TAZOBACTAM 1500 MG: 1; .5 INJECTION, POWDER, LYOPHILIZED, FOR SOLUTION INTRAVENOUS at 05:08

## 2019-01-01 RX ADMIN — MIDAZOLAM HYDROCHLORIDE 4 MG: 1 INJECTION, SOLUTION INTRAMUSCULAR; INTRAVENOUS at 01:04

## 2019-01-01 RX ADMIN — METHYLPREDNISOLONE SODIUM SUCCINATE 500 MG: 40 INJECTION, POWDER, FOR SOLUTION INTRAMUSCULAR; INTRAVENOUS at 08:06

## 2019-01-01 RX ADMIN — HYDROCORTISONE SODIUM SUCCINATE 50 MG: 100 INJECTION, POWDER, FOR SOLUTION INTRAMUSCULAR; INTRAVENOUS at 11:08

## 2019-01-01 RX ADMIN — CALCIUM GLUCONATE 2 G: 94 INJECTION, SOLUTION INTRAVENOUS at 01:09

## 2019-01-01 RX ADMIN — LORAZEPAM 1 MG: 1 TABLET ORAL at 12:07

## 2019-01-01 RX ADMIN — MAGNESIUM SULFATE IN WATER 2 G: 40 INJECTION, SOLUTION INTRAVENOUS at 03:07

## 2019-01-01 RX ADMIN — NOREPINEPHRINE BITARTRATE 2 MCG/KG/MIN: 1 INJECTION, SOLUTION, CONCENTRATE INTRAVENOUS at 01:08

## 2019-01-01 RX ADMIN — MIDAZOLAM HYDROCHLORIDE 2 MG: 1 INJECTION, SOLUTION INTRAMUSCULAR; INTRAVENOUS at 02:07

## 2019-01-01 RX ADMIN — OXYCODONE HYDROCHLORIDE 10 MG: 10 TABLET ORAL at 10:07

## 2019-01-01 RX ADMIN — SODIUM BICARBONATE 100 MEQ: 84 INJECTION, SOLUTION INTRAVENOUS at 04:06

## 2019-01-01 RX ADMIN — SODIUM CHLORIDE 1000 ML: 0.9 INJECTION, SOLUTION INTRAVENOUS at 07:07

## 2019-01-01 RX ADMIN — LACTULOSE 20 G: 20 SOLUTION ORAL at 09:06

## 2019-01-01 RX ADMIN — LORAZEPAM 2 MG: 1 TABLET ORAL at 08:04

## 2019-01-01 RX ADMIN — CEFEPIME 2 G: 2 INJECTION, POWDER, FOR SOLUTION INTRAVENOUS at 06:04

## 2019-01-01 RX ADMIN — VANCOMYCIN HYDROCHLORIDE 1250 MG: 1 INJECTION, POWDER, LYOPHILIZED, FOR SOLUTION INTRAVENOUS at 11:08

## 2019-01-01 RX ADMIN — IOHEXOL 100 ML: 350 INJECTION, SOLUTION INTRAVENOUS at 01:09

## 2019-01-01 RX ADMIN — HYDROMORPHONE HYDROCHLORIDE 0.75 MG: 1 INJECTION, SOLUTION INTRAMUSCULAR; INTRAVENOUS; SUBCUTANEOUS at 02:07

## 2019-01-01 RX ADMIN — DIPHENHYDRAMINE HYDROCHLORIDE 50 MG: 50 INJECTION, SOLUTION INTRAMUSCULAR; INTRAVENOUS at 10:08

## 2019-01-01 RX ADMIN — FENTANYL CITRATE 100 MCG: 50 INJECTION INTRAMUSCULAR; INTRAVENOUS at 01:06

## 2019-01-01 RX ADMIN — ACETAMINOPHEN 1000 MG: 500 TABLET ORAL at 05:04

## 2019-01-01 RX ADMIN — NOREPINEPHRINE BITARTRATE 2.4 MCG/KG/MIN: 1 INJECTION, SOLUTION, CONCENTRATE INTRAVENOUS at 07:08

## 2019-01-01 RX ADMIN — PANCRELIPASE 1 TABLET: 10440; 39150; 39150 TABLET ORAL at 11:08

## 2019-01-01 RX ADMIN — TOBRAMYCIN SULFATE 90 MG: 40 INJECTION, SOLUTION INTRAMUSCULAR; INTRAVENOUS at 03:08

## 2019-01-01 RX ADMIN — Medication 400 MG: at 12:04

## 2019-01-01 RX ADMIN — VASOPRESSIN 0.08 UNITS/MIN: 20 INJECTION INTRAVENOUS at 03:08

## 2019-01-01 RX ADMIN — PREDNISONE 50 MG: 10 TABLET ORAL at 08:06

## 2019-01-01 RX ADMIN — PANCRELIPASE 1 TABLET: 10440; 39150; 39150 TABLET ORAL at 08:08

## 2019-01-01 RX ADMIN — VASOPRESSIN 0.08 UNITS/MIN: 20 INJECTION INTRAVENOUS at 07:08

## 2019-01-01 RX ADMIN — QUETIAPINE FUMARATE 25 MG: 25 TABLET ORAL at 12:07

## 2019-01-01 RX ADMIN — VENLAFAXINE HYDROCHLORIDE 150 MG: 37.5 CAPSULE, EXTENDED RELEASE ORAL at 10:04

## 2019-01-01 RX ADMIN — SODIUM PHOSPHATE, MONOBASIC, MONOHYDRATE AND SODIUM PHOSPHATE, DIBASIC, ANHYDROUS 39.99 MMOL: 276; 142 INJECTION, SOLUTION INTRAVENOUS at 11:08

## 2019-01-01 RX ADMIN — OXYCODONE HYDROCHLORIDE 5 MG: 5 TABLET ORAL at 11:08

## 2019-01-01 RX ADMIN — REMIFENTANIL HYDROCHLORIDE 0.2 MCG/KG/MIN: 1 INJECTION, POWDER, LYOPHILIZED, FOR SOLUTION INTRAVENOUS at 01:04

## 2019-01-01 RX ADMIN — NOREPINEPHRINE BITARTRATE 0.3 MCG/KG/MIN: 1 INJECTION, SOLUTION, CONCENTRATE INTRAVENOUS at 05:09

## 2019-01-01 RX ADMIN — GLYCOPYRROLATE 0.2 MG: 0.2 INJECTION, SOLUTION INTRAMUSCULAR; INTRAVENOUS at 07:07

## 2019-01-01 RX ADMIN — PROPOFOL 15 MCG/KG/MIN: 10 INJECTION, EMULSION INTRAVENOUS at 11:08

## 2019-01-01 RX ADMIN — METOPROLOL TARTRATE 25 MG: 25 TABLET, FILM COATED ORAL at 11:07

## 2019-01-01 RX ADMIN — CLONIDINE HYDROCHLORIDE: 100 INJECTION, SOLUTION INTRAVENOUS at 08:06

## 2019-01-01 RX ADMIN — LOPERAMIDE HYDROCHLORIDE 2 MG: 2 CAPSULE ORAL at 12:07

## 2019-01-01 RX ADMIN — HYDROMORPHONE HYDROCHLORIDE: 2 INJECTION INTRAMUSCULAR; INTRAVENOUS; SUBCUTANEOUS at 10:06

## 2019-01-01 RX ADMIN — NICARDIPINE HYDROCHLORIDE 15 MG/HR: 0.2 INJECTION, SOLUTION INTRAVENOUS at 03:06

## 2019-01-01 RX ADMIN — ACETAMINOPHEN 1000 MG: 10 INJECTION, SOLUTION INTRAVENOUS at 03:06

## 2019-01-01 RX ADMIN — DIPHENHYDRAMINE HYDROCHLORIDE 50 MG: 50 INJECTION, SOLUTION INTRAMUSCULAR; INTRAVENOUS at 07:08

## 2019-01-01 RX ADMIN — GANCICLOVIR SODIUM 295 MG: 500 INJECTION, POWDER, LYOPHILIZED, FOR SOLUTION INTRAVENOUS at 02:07

## 2019-01-01 RX ADMIN — CEFEPIME 1 G: 1 INJECTION, POWDER, FOR SOLUTION INTRAMUSCULAR; INTRAVENOUS at 04:07

## 2019-01-01 RX ADMIN — CEFTOLOZANE AND TAZOBACTAM 1500 MG: 1; .5 INJECTION, POWDER, LYOPHILIZED, FOR SOLUTION INTRAVENOUS at 03:08

## 2019-01-01 RX ADMIN — VASOPRESSIN 0.04 UNITS/MIN: 20 INJECTION INTRAVENOUS at 06:09

## 2019-01-01 RX ADMIN — PROPOFOL 20 MCG/KG/MIN: 10 INJECTION, EMULSION INTRAVENOUS at 06:06

## 2019-01-01 RX ADMIN — DIPHENHYDRAMINE HYDROCHLORIDE 25 MG: 50 INJECTION, SOLUTION INTRAMUSCULAR; INTRAVENOUS at 04:07

## 2019-01-01 RX ADMIN — LEVALBUTEROL 1.25 MG: 1.25 SOLUTION, CONCENTRATE RESPIRATORY (INHALATION) at 04:08

## 2019-01-01 RX ADMIN — PHENYLEPHRINE HYDROCHLORIDE 4 MCG/KG/MIN: 10 INJECTION INTRAVENOUS at 01:09

## 2019-01-01 RX ADMIN — HEPARIN SODIUM 5000 UNITS: 5000 INJECTION, SOLUTION INTRAVENOUS; SUBCUTANEOUS at 10:08

## 2019-01-01 RX ADMIN — PROPOFOL 10 MCG/KG/MIN: 10 INJECTION, EMULSION INTRAVENOUS at 07:06

## 2019-01-01 RX ADMIN — DAPSONE 100 MG: 100 TABLET ORAL at 09:08

## 2019-01-01 RX ADMIN — INSULIN ASPART 2 UNITS: 100 INJECTION, SOLUTION INTRAVENOUS; SUBCUTANEOUS at 07:06

## 2019-01-01 RX ADMIN — HYDROMORPHONE HYDROCHLORIDE 1 MG: 1 INJECTION, SOLUTION INTRAMUSCULAR; INTRAVENOUS; SUBCUTANEOUS at 12:07

## 2019-01-01 RX ADMIN — METHYLPREDNISOLONE SODIUM SUCCINATE 118 MG: 125 INJECTION, POWDER, FOR SOLUTION INTRAMUSCULAR; INTRAVENOUS at 09:06

## 2019-01-01 RX ADMIN — MORPHINE SULFATE 15 MG: 15 TABLET ORAL at 04:07

## 2019-01-01 RX ADMIN — Medication 1.5 MG: at 07:06

## 2019-01-01 RX ADMIN — DIPHENHYDRAMINE HYDROCHLORIDE 50 MG: 50 INJECTION, SOLUTION INTRAMUSCULAR; INTRAVENOUS at 01:07

## 2019-01-01 RX ADMIN — POTASSIUM CHLORIDE 40 MEQ: 400 INJECTION, SOLUTION INTRAVENOUS at 05:06

## 2019-01-01 RX ADMIN — VANCOMYCIN HYDROCHLORIDE 1000 MG: 1 INJECTION, POWDER, LYOPHILIZED, FOR SOLUTION INTRAVENOUS at 07:04

## 2019-01-01 RX ADMIN — Medication 500 MG: at 10:06

## 2019-01-01 RX ADMIN — MAGNESIUM SULFATE IN WATER 2 G: 40 INJECTION, SOLUTION INTRAVENOUS at 08:06

## 2019-01-01 RX ADMIN — FLUCONAZOLE 400 MG: 2 INJECTION, SOLUTION INTRAVENOUS at 08:07

## 2019-01-01 RX ADMIN — DIPHENHYDRAMINE HYDROCHLORIDE 25 MG: 25 SOLUTION ORAL at 10:08

## 2019-01-01 RX ADMIN — Medication 1 MG: at 05:06

## 2019-01-01 RX ADMIN — NOREPINEPHRINE BITARTRATE 0.69 MCG/KG/MIN: 1 INJECTION, SOLUTION, CONCENTRATE INTRAVENOUS at 12:09

## 2019-01-01 RX ADMIN — HYDROMORPHONE HYDROCHLORIDE 2 MG: 2 INJECTION INTRAMUSCULAR; INTRAVENOUS; SUBCUTANEOUS at 05:06

## 2019-01-01 RX ADMIN — ACETAMINOPHEN 1000 MG: 500 TABLET ORAL at 10:04

## 2019-01-01 RX ADMIN — MAGNESIUM SULFATE IN WATER 2 G: 40 INJECTION, SOLUTION INTRAVENOUS at 08:07

## 2019-01-01 RX ADMIN — HYDROMORPHONE HYDROCHLORIDE 0.75 MG: 1 INJECTION, SOLUTION INTRAMUSCULAR; INTRAVENOUS; SUBCUTANEOUS at 04:07

## 2019-01-01 RX ADMIN — METOPROLOL TARTRATE 25 MG: 25 TABLET ORAL at 07:08

## 2019-01-01 RX ADMIN — MEROPENEM AND SODIUM CHLORIDE 1 G: 1 INJECTION, SOLUTION INTRAVENOUS at 11:04

## 2019-01-01 RX ADMIN — PROPOFOL 150 MCG/KG/MIN: 10 INJECTION, EMULSION INTRAVENOUS at 01:04

## 2019-01-01 RX ADMIN — QUETIAPINE FUMARATE 50 MG: 25 TABLET ORAL at 07:08

## 2019-01-01 RX ADMIN — Medication 1 MG: at 06:06

## 2019-01-01 RX ADMIN — PROPOFOL 20 MCG/KG/MIN: 10 INJECTION, EMULSION INTRAVENOUS at 01:06

## 2019-01-01 RX ADMIN — LACTULOSE 15 G: 20 SOLUTION ORAL at 03:06

## 2019-01-01 RX ADMIN — PROPOFOL 100 MCG/KG/MIN: 10 INJECTION, EMULSION INTRAVENOUS at 07:08

## 2019-01-01 RX ADMIN — SODIUM BICARBONATE 50 MEQ: 84 INJECTION, SOLUTION INTRAVENOUS at 02:08

## 2019-01-01 RX ADMIN — HEPARIN SODIUM 16000 UNITS: 1000 INJECTION, SOLUTION INTRAVENOUS; SUBCUTANEOUS at 08:06

## 2019-01-01 RX ADMIN — DEXTROSE MONOHYDRATE, SODIUM CHLORIDE, AND POTASSIUM CHLORIDE: 50; 4.5; 1.49 INJECTION, SOLUTION INTRAVENOUS at 03:06

## 2019-01-01 RX ADMIN — MUPIROCIN 1 G: 20 OINTMENT TOPICAL at 10:06

## 2019-01-01 RX ADMIN — PROPOFOL 250 MG: 10 INJECTION, EMULSION INTRAVENOUS at 04:06

## 2019-01-01 RX ADMIN — GABAPENTIN 300 MG: 300 CAPSULE ORAL at 12:04

## 2019-01-01 RX ADMIN — SODIUM BICARBONATE: 84 INJECTION, SOLUTION INTRAVENOUS at 02:08

## 2019-01-01 RX ADMIN — PANCRELIPASE 6 CAPSULE: 24000; 76000; 120000 CAPSULE, DELAYED RELEASE PELLETS ORAL at 01:07

## 2019-01-01 RX ADMIN — FENTANYL CITRATE 25 MCG: 50 INJECTION, SOLUTION INTRAMUSCULAR; INTRAVENOUS at 03:04

## 2019-01-01 RX ADMIN — DEXTROSE 350 MG: 50 INJECTION, SOLUTION INTRAVENOUS at 10:07

## 2019-01-01 RX ADMIN — VENLAFAXINE HYDROCHLORIDE 150 MG: 150 CAPSULE, EXTENDED RELEASE ORAL at 07:08

## 2019-01-01 RX ADMIN — DEXMEDETOMIDINE HYDROCHLORIDE 1.4 MCG/KG/HR: 4 INJECTION, SOLUTION INTRAVENOUS at 11:08

## 2019-01-01 RX ADMIN — ROCURONIUM BROMIDE 35 MG: 10 INJECTION, SOLUTION INTRAVENOUS at 01:06

## 2019-01-01 RX ADMIN — METOCLOPRAMIDE 5 MG: 5 INJECTION, SOLUTION INTRAMUSCULAR; INTRAVENOUS at 10:07

## 2019-01-01 RX ADMIN — ROCURONIUM BROMIDE 30 MG: 10 INJECTION, SOLUTION INTRAVENOUS at 06:08

## 2019-01-01 RX ADMIN — VENLAFAXINE HYDROCHLORIDE 150 MG: 37.5 CAPSULE, EXTENDED RELEASE ORAL at 09:07

## 2019-01-01 RX ADMIN — FENTANYL CITRATE 50 MCG: 50 INJECTION, SOLUTION INTRAMUSCULAR; INTRAVENOUS at 08:07

## 2019-01-01 RX ADMIN — SODIUM BICARBONATE 100 MEQ: 84 INJECTION, SOLUTION INTRAVENOUS at 12:08

## 2019-01-01 RX ADMIN — DIPHENHYDRAMINE HYDROCHLORIDE 50 MG: 50 INJECTION, SOLUTION INTRAMUSCULAR; INTRAVENOUS at 06:04

## 2019-01-01 RX ADMIN — ONDANSETRON 4 MG: 2 INJECTION INTRAMUSCULAR; INTRAVENOUS at 08:07

## 2019-01-01 RX ADMIN — MAGNESIUM SULFATE IN WATER 2 G: 40 INJECTION, SOLUTION INTRAVENOUS at 04:08

## 2019-01-01 RX ADMIN — FENTANYL CITRATE 100 MCG: 50 INJECTION, SOLUTION INTRAMUSCULAR; INTRAVENOUS at 08:08

## 2019-01-01 RX ADMIN — FENTANYL CITRATE 25 MCG: 50 INJECTION, SOLUTION INTRAMUSCULAR; INTRAVENOUS at 07:08

## 2019-01-01 RX ADMIN — Medication 200 MCG/HR: at 07:06

## 2019-01-01 RX ADMIN — VASOPRESSIN 0.04 UNITS/MIN: 20 INJECTION INTRAVENOUS at 12:08

## 2019-01-01 RX ADMIN — ACETAMINOPHEN 1000 MG: 10 INJECTION, SOLUTION INTRAVENOUS at 01:06

## 2019-01-01 RX ADMIN — LORAZEPAM 2 MG: 2 INJECTION INTRAMUSCULAR; INTRAVENOUS at 06:06

## 2019-01-01 RX ADMIN — DEXMEDETOMIDINE HYDROCHLORIDE 1.4 MCG/KG/HR: 4 INJECTION, SOLUTION INTRAVENOUS at 12:08

## 2019-01-01 RX ADMIN — ACETAMINOPHEN 650 MG: 325 TABLET ORAL at 03:07

## 2019-01-01 RX ADMIN — INSULIN ASPART 4 UNITS: 100 INJECTION, SOLUTION INTRAVENOUS; SUBCUTANEOUS at 08:08

## 2019-01-01 RX ADMIN — VANCOMYCIN HYDROCHLORIDE 1250 MG: 1 INJECTION, POWDER, LYOPHILIZED, FOR SOLUTION INTRAVENOUS at 10:08

## 2019-01-01 RX ADMIN — DEXMEDETOMIDINE HYDROCHLORIDE 0.9 MCG/KG/HR: 100 INJECTION, SOLUTION, CONCENTRATE INTRAVENOUS at 03:06

## 2019-01-01 RX ADMIN — INSULIN ASPART 5 UNITS: 100 INJECTION, SOLUTION INTRAVENOUS; SUBCUTANEOUS at 08:07

## 2019-01-01 RX ADMIN — HYDROMORPHONE HYDROCHLORIDE: 2 INJECTION INTRAMUSCULAR; INTRAVENOUS; SUBCUTANEOUS at 05:06

## 2019-01-01 RX ADMIN — ALBUMIN (HUMAN) 25 G: 12.5 SOLUTION INTRAVENOUS at 04:06

## 2019-01-01 RX ADMIN — SODIUM BICARBONATE: 84 INJECTION, SOLUTION INTRAVENOUS at 06:08

## 2019-01-01 RX ADMIN — DIPHENHYDRAMINE HYDROCHLORIDE 50 MG: 50 INJECTION, SOLUTION INTRAMUSCULAR; INTRAVENOUS at 03:04

## 2019-01-01 RX ADMIN — INSULIN ASPART 4 UNITS: 100 INJECTION, SOLUTION INTRAVENOUS; SUBCUTANEOUS at 12:06

## 2019-01-01 RX ADMIN — CALCIUM GLUCONATE 1000 MG: 98 INJECTION, SOLUTION INTRAVENOUS at 11:08

## 2019-01-01 RX ADMIN — PANCRELIPASE 1 TABLET: 10440; 39150; 39150 TABLET ORAL at 05:08

## 2019-01-01 RX ADMIN — OXYCODONE HYDROCHLORIDE 10 MG: 10 TABLET ORAL at 06:08

## 2019-01-01 RX ADMIN — PANCRELIPASE 4 CAPSULE: 60000; 12000; 38000 CAPSULE, DELAYED RELEASE PELLETS ORAL at 05:04

## 2019-01-01 RX ADMIN — FENTANYL CITRATE 100 MCG: 50 INJECTION INTRAMUSCULAR; INTRAVENOUS at 08:08

## 2019-01-01 RX ADMIN — BUPIVACAINE HYDROCHLORIDE: 2.5 INJECTION, SOLUTION EPIDURAL; INFILTRATION; INTRACAUDAL; PERINEURAL at 06:06

## 2019-01-01 RX ADMIN — MAGNESIUM SULFATE IN WATER 2 G: 40 INJECTION, SOLUTION INTRAVENOUS at 01:04

## 2019-01-02 NOTE — TELEPHONE ENCOUNTER
"Returned call to the patient who is requesting a change of medication for GERD. Stated that she currently takes omeprazole 40 mg daily and she is having breakthrough GERD s/s even after taking it and throughout the day. Is concerned about switching due to her "kidney". Will discuss with providers and alex Leal today during a scheduled medication review meeting then re-contact her with the plan. Patient verbalized her understanding.    Discussed issue of non-efficacy with omeprazole with providers and alex Leal at a medication review meeting today. Received VORB from Dr. Morin, as recommended by alex Leal to change from omeprazole to Nexium 40 mg daily. If insurance will not cover it, patient will be instructed to obtain an OTC supply as a trial.  Patient contacted with instructions. Erx for generic Nexium sent to her local pharmacy[facility] request.  "

## 2019-01-02 NOTE — TELEPHONE ENCOUNTER
----- Message from Kyleigh Gunn sent at 1/2/2019 11:47 AM CST -----  Contact: Pt  Message for Coordinator    Who called: Pt  Message: Calling to speak with Aletha in regards to seeing if she can switch prescriptions.    Contact preferences: [phone]  Additional Information: N/A

## 2019-01-07 NOTE — TELEPHONE ENCOUNTER
----- Message from Parker Dennison sent at 1/7/2019  1:50 PM CST -----  Contact: patient   Patient have a medical question about the test she is doing at home and would like a call today if possible.           Please call [phone]      Thanks!     Contacted Gisel.  She had a question regarding the 24 hour urine collection.  She stated that she started the urine collection at 8am this morning.  She discarded the first urine.  She inquired if she has to discard the urine in the morning upon waking.  Informed her that when she wakes tomorrow at 8am, she needs to collect the urine.  She verbalized her understanding.

## 2019-01-09 NOTE — TELEPHONE ENCOUNTER
During the lung transplant selection meeting today, Dr. Alvarez discussed the possibility of evaluating patient for retransplantation.  Members present at the meeting were:  Sin Alvarez, Francisca Morin, David Gay, Ramírez Hutson and Hayes Diaz, Yasmani Rain (pharm D), Shama Canales (PA), Zuleima Calero (RN), Sylvia Gunter (RN), Nancy Alves (RN), Rocio Wellington (), Lacey Gomez (), and Yenni Weaver (NAZ).  Instructed to proceed with evaluation testing for possible retransplantation.

## 2019-01-09 NOTE — PROGRESS NOTES
LUNG TRANSPLANT RECIPIENT FOLLOW-UP    Reason for Visit: Follow-up after lung transplantation.      Date of Transplant: 6/12/14     Reason for Transplant: Cystic fibrosis      Type of Transplant: bilateral sequential lung     CMV Status: D+ / R-      Major Complications:   1. Vocal cord dysfunction- resolved   2. A1 Rejection 8/2014   3. C glabrata positive sputum-recurrent  4. Pseudomonas pneumonia in 02/2015 resolved   5. 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 CARLOS EDUARDO (grade 3)  7. Chronic respiratory failure                                                                               History of Present Illness: Juanita Ibarra is a 28 y.o. year old female with the above listed transplant history who presents for routine follow-up.  Since her last clinic visit she has been admitted again for pneumonias and is now oxygen dependent. She recently finished antibiotics and says that she is feeling better. We discussed her case in selection committee yesterday and it was agreed that she is an acceptable candidate for re-transplantation contingent on her workup being unremarkable. She and her  are planning to move to the [place] area.    Review of Systems   Constitutional: Positive for malaise/fatigue. Negative for chills, diaphoresis, fever and weight loss.   HENT: Negative for congestion, ear discharge, ear pain, hearing loss, nosebleeds and sore throat.    Eyes: Negative for blurred vision, double vision, photophobia and pain.   Respiratory: Positive for cough and shortness of breath. Negative for hemoptysis, sputum production, wheezing and stridor.    Cardiovascular: Negative for chest pain, palpitations, orthopnea, claudication, leg swelling and PND.   Gastrointestinal: Negative for abdominal pain, blood in stool, constipation, diarrhea, heartburn, melena, nausea and vomiting.   Genitourinary: Negative for dysuria, flank pain, frequency, hematuria and urgency.   Musculoskeletal: Negative for back pain, falls, joint  "pain, myalgias and neck pain.   Skin: Negative for itching and rash.   Neurological: Negative for dizziness, tingling, tremors, sensory change, focal weakness, seizures, loss of consciousness, weakness and headaches.   Endo/Heme/Allergies: Does not bruise/bleed easily.   Psychiatric/Behavioral: Negative for depression, hallucinations, memory loss and suicidal ideas. The patient is not nervous/anxious and does not have insomnia.      /79   Pulse 96   Temp 97.4 °F (36.3 °C) (Oral)   Resp 20   Ht 5' 1" (1.549 m)   Wt 55.8 kg (123 lb)   LMP 10/02/2016   SpO2 97%   BMI 23.24 kg/m²     Physical Exam   Constitutional: She is oriented to person, place, and time and well-developed, well-nourished, and in no distress. No distress.   HENT:   Head: Normocephalic and atraumatic.   Nose: Nose normal.   Mouth/Throat: Oropharynx is clear and moist. No oropharyngeal exudate.   Eyes: Conjunctivae and EOM are normal. Pupils are equal, round, and reactive to light. No scleral icterus.   Neck: Normal range of motion. Neck supple. No JVD present. No tracheal deviation present. No thyromegaly present.   Cardiovascular: Normal rate, regular rhythm and intact distal pulses. Exam reveals no gallop and no friction rub.   No murmur heard.  Pulmonary/Chest: Effort normal and breath sounds normal. No stridor. No respiratory distress. She has no wheezes. She has no rales. She exhibits no tenderness.   Abdominal: Soft. Bowel sounds are normal. She exhibits no distension and no mass. There is no tenderness. There is no rebound and no guarding.   Musculoskeletal: Normal range of motion. She exhibits no edema.   Lymphadenopathy:     She has no cervical adenopathy.   Neurological: She is alert and oriented to person, place, and time. No cranial nerve deficit. Gait normal. Coordination normal. GCS score is 15.   Skin: Skin is warm and dry. No rash noted. She is not diaphoretic. No erythema. No pallor.   Psychiatric: Mood and affect " normal.     Labs:  cbc, cmp, tacrolimus Latest Ref Rng & Units 12/12/2018 1/9/2019 1/9/2019   TACROLIMUS LVL 5.0 - 15.0 ng/mL 5.3 5.1 -   WHITE BLOOD CELL COUNT 3.90 - 12.70 K/uL 3.93 8.98 -   RBC 4.00 - 5.40 M/uL 2.68(L) 3.76(L) -   HEMOGLOBIN 12.0 - 16.0 g/dL 7.5(L) 9.9(L) -   HEMATOCRIT 37.0 - 48.5 % 25.7(L) 33.3(L) -   MCV 82 - 98 fL 96 89 -   MCH 27.0 - 31.0 pg 28.0 26.3(L) -   MCHC 32.0 - 36.0 g/dL 29.2(L) 29.7(L) -   RDW 11.5 - 14.5 % 14.4 14.7(H) -   PLATELETS 150 - 350 K/uL 101(L) 131(L) -   MPV 9.2 - 12.9 fL 10.9 12.0 -   GRAN # 1.8 - 7.7 K/uL 3.1 5.9 -   LYMPH # 1.0 - 4.8 K/uL 0.7(L) 2.1 -   MONO # 0.3 - 1.0 K/uL 0.2(L) 0.7 -   EOSINOPHIL% 0.0 - 8.0 % 0.0 3.1 -   BASOPHIL% 0.0 - 1.9 % 0.0 0.3 -   DIFFERENTIAL METHOD - Automated Automated -   SODIUM 136 - 145 mmol/L 135(L) 139 139   POTASSIUM 3.5 - 5.1 mmol/L 4.8 3.7 3.7   CHLORIDE 95 - 110 mmol/L 105 107 107   CO2 23 - 29 mmol/L 24 23 23   GLUCOSE 70 - 110 mg/dL 251(H) 97 97   BUN BLD 6 - 20 mg/dL 30(H) 21(H) 21(H)   CREATININE 0.5 - 1.4 mg/dL 1.3 0.9 0.9   CALCIUM 8.7 - 10.5 mg/dL 8.8 8.8 8.8   PROTEIN TOTAL 6.0 - 8.4 g/dL 7.4 7.5 7.5   ALBUMIN 3.5 - 5.2 g/dL 3.7 3.7 3.7   BILIRUBIN TOTAL 0.1 - 1.0 mg/dL 0.2 0.3 0.3   ALK PHOS 55 - 135 U/L 155(H) 244(H) 244(H)   AST 10 - 40 U/L 21 43(H) 43(H)   ALT 10 - 44 U/L 29 83(H) 83(H)   ANION GAP 8 - 16 mmol/L 6(L) 9 9   EGFR IF AFRICAN AMERICAN >60 mL/min/1.73 m:2 >60.0 >60.0 >60.0   EGFR IF NON-AFRICAN AMERICAN >60 mL/min/1.73 m:2 55.6(A) >60.0 >60.0     Pulmonary Function Tests 1/9/2019 11/26/2018 10/22/2018 6/18/2018 4/23/2018 1/15/2018 11/13/2017   FVC 1.79 1.85 2.23 2.52 2.21 2.2 2.03   FEV1 0.71 0.78 0.9 0.93 0.88 0.9 0.84   TLC (liters) - - - - - - -   DLCO (ml/mmHg sec) - - - - - - -   FVC% 56 54.2 65.3 78 69 68 63   FEV1% 25 26.6 30.9 33 31 32 30   FEF 25-75 0.28 0.33 0.35 0.35 0.36 0.39 0.33   FEF 25-75% 8 9.8 10.5 11 11 12 10   TLC% - - - - - - -   RV - - - - - - -   RV% - - - - - - -   DLCO% - - - - -  - -     6MW 1/9/2019 12/4/2014 5/29/2014 4/30/2014 4/17/2014 4/4/2014 3/19/2014   6MWT Status completed without stopping completed without stopping - completed with stops completed with stops completed without stopping completed without stopping   Patient Reported Dyspnea;Lightheadedness;Dizziness Leg pain Dizziness;Dyspnea Dyspnea;Other (Comment) Dyspnea Dyspnea;Lightheadedness Dyspnea;Lightheadedness   Was O2 used? Yes No Yes Yes Yes Yes Yes   Delivery Method Cannula;Continuous Flow;Pull Tank - Cannula;Pull Tank;Continuous Flow Cannula;Pull Tank;Continuous Flow Cannula;Pull Tank;Continuous Flow Cannula;Pull Tank;Continuous Flow Cannula;Pull Tank;Continuous Flow   6MW Distance walked (feet) 600 1975 670 600 650 685 675   Distance walked (meters) 182.88 601.98 204.22 182.88 198.12 208.79 205.74   Did patient stop? No No No Yes Yes No No   Oxygen Saturation 99 98 95 98 96 97 98   Supplemental Oxygen 3 L/M Room Air 6 L/M 6 L/M 6 L/M 6 L/M 6 L/M   Heart Rate 97 118 93 112 119 127 118   Blood Pressure 119/79 134/88 101/57 121/66 122/71 116/69 129/72   Maris Dyspnea Rating  moderate nothing at all moderate heavy somewhat heavy moderate moderate   Oxygen Saturation 98 97 93 98 78 95 99   Supplemental Oxygen 3 L/M Room Air 6 L/M 6 L/M 6 L/M 6 L/M 6 L/M   Heart Rate 114 148 101 122 134 123 130   Blood Pressure 113/74 165/99 127/61 145/91 163/92 116/77 136/79   Maris Dyspnea Rating  very heavy moderate heavy very heavy very,very heavy very heavy very heavy   Recovery Time (seconds) 66 63 100 62 420 142 99   Oxygen Saturation 99 98 97 98 95 97 97   Supplemental Oxygen 3 L/M Room Air 6 L/M 6 L/M 6 L/M 6 L/M 6 L/M   Heart Rate 99 130 93 115 122 126 125         Imaging:  Results for orders placed during the hospital encounter of 01/09/19   X-Ray Chest PA And Lateral    Narrative EXAMINATION:  XR CHEST PA AND LATERAL    CLINICAL HISTORY:  s/p bilateral lung transplant 6/12/2014;    TECHNIQUE:  Two views of the chest were obtained,  with PA and lateral projections submitted.    COMPARISON:  Comparison is made to 12/04/2018.    FINDINGS:  Postoperative changes again noted in the thorax.  Right subclavian origin vascular catheter has its tip in the superior vena cava.  Heart size is normal, as is the appearance of the pulmonary vascularity.  Lung zones are stable and essentially clear, free of significant airspace consolidation or volume loss.  No pleural fluid.  No hilar or mediastinal mass lesion.  No pneumothorax.      Impression No significant intrathoracic abnormality.  No significant detrimental interval change in the appearance of the chest since 12/04/2018.      Electronically signed by: Pradip Caraballo MD  Date:    01/09/2019  Time:    08:48           Assessment:  1. Encounter following organ transplant    2. Bronchiolitis obliterans syndrome    3. Chronic respiratory failure with hypoxia    4. Immunosuppression    5. Prophylactic antibiotic    6. Debility      Plan:   1. With her continued decrease in FEV1 and repeated infection it was decided to work-up [facility] for a re-transplant.     2. Continue to use her oxygen.    3. Continue tacrolimus, mycophenolate, and prednisone.     4. Continue dapsone.     5. She will need to be referred to pulmonary rehabilitation.     6. RTC in 2-3 months depending on her re-listing status

## 2019-01-09 NOTE — TELEPHONE ENCOUNTER
----- Message from Jake Alvarez MD sent at 1/9/2019 12:41 PM CST -----  Needs pulmonary rehab    Patient informed during education that she needs to improve her walk distance.  Also informed her of Dr. Alvarez's orders for pulmonary rehab.      Orders for pulmonary rehab awaiting MD's signature.  Orders given to Lacey .

## 2019-01-09 NOTE — PROGRESS NOTES
PRE EDUCATION NOTE    Met with Juanita Ibarra and her significant other for pre-transplant education and to consent for lung transplant evaluation.  Pre-transplant educational binder given to patient.  Instructed patient to read the binder.      Thorough pre-transplant education conducted.    Information presented included:  · Evaluation process and testing  · Members of the transplant team  · Selection committee members and role of the committee  · Contraindications to lung transplantation  · Policy for absolute smoking /nicotine cessation for at least 6 months prior to listing  · Relocation pre- and post-transplant  · Listing process for transplant: explained the listing designations, active versus inactive (status 7), lung allocation score (LAS), and allocation of lungs within 250 nautical miles  · National graft survival statistics, our current survival statistics since reopening of the program to present, discussed statistics for retransplantation  · Wait time on the list  · The need to reach patient within 15 minutes with donor offer  ·  [facility] donors with increased risk of disease transmission  · Donor criteria and testing on donor, procurement of donor lungs and ischemic time, blood transfusions, process for matching donor with recipient  · Transplant surgery, single vs. double, estimated length of surgery, surgical incision (clamshell incision), chest tubes and purpose, and ventilator  · Post-transplant immunosuppression for life with the need to be able to afford post transplant medications, immediate post transplant ICU stay - extubation, explained the importance of getting out of bed (once extubated) and the importance of coughing and taking deep breaths despite the pain to prevent pneumonia  · Need for a caregiver to be with her at all times beginning with discharge from ICU and transfer to TSU, through at least the first 3 months post-transplant possibly longer  · Post-transplant  medications, their side effects, and self medications  · Discharge, follow-up clinic visits, testing with each visit, and surveillance bronchoscopies  · Rejection and treatment, how to reach team members at any time  · Health maintenance post-transplant, avoiding large crowds and sick contacts, wearing a mask, good handwashing, pet policy, fishing, dermatology, opthamology, and dental visits post-transplant  · Multiple listing information provided    Juanita Ibarra and her significant other asked pertinent questions which were answered to their satisfaction.     Informed Consent to Undergo Evaluation for Lung Transplantation reviewed and discussed with patient and her significant other.  Consent initialed and signed by patient.  Copy of consent given to patient.  Original to be sent to Medical Records for scanning.

## 2019-01-09 NOTE — PROCEDURES
Juanita Ibarra is a 28 y.o.  female patient, who presents for a 6 minute walk test ordered by Matt Alvarez MD.  The diagnosis is S/P Lung Transplant.  The patient's BMI is 23.3 kg/m2.  Predicted distance (lower limit of normal) is 563.54 meters.      Test Results:    The test was completed without stopping.  The total time walked was 360 seconds.  During walking, the patient reported:  Dyspnea, Lightheadedness, Dizziness.  The patient used supplemental oxygen during testing.     01/09/2019---------Distance: 182.88 meters (600 feet)     O2 Sat % Supplemental Oxygen Heart Rate Blood Pressure Maris Scale   Pre-exercise  (Resting) 99 % 3 L/M 97 bpm 119/79 mmHg 3   During Exercise 98 % 3 L/M 114 bpm 113/74 mmHg 7-8   Post-exercise  (Recovery) 99 % 3 L/M  99 bpm       Recovery Time:  66 seconds    Performing nurse/tech:  Neal MARLOW      PREVIOUS STUDY:   12/04/2014---------Distance: 601.98 meters (1975 feet)       O2 Sat % Supplemental Oxygen Heart Rate Blood Pressure Maris Scale   Pre-exercise  (Resting) 98 % Room Air 118 bpm 134/88 mmHg 0   During Exercise 97 % Room Air 148 bpm 165/99 mmHg 3   Post-exercise  (Recovery) 98 % Room Air  130 bpm 147/89 mmHg        CLINICAL INTERPRETATION:  Six minute walk distance is 182.88 meters (600 feet) with very heavy dyspnea.  During exercise, there was no significant desaturation while breathing supplemental oxygen.  Both blood pressure and heart rate remained stable with walking.  The patient reported non-pulmonary symptoms during exercise.  Significant exercise impairment is likely due to subjective symptoms.  The patient did complete the study, walking 360 seconds of the 360 second test.  Since the previous study in December 2014, exercise capacity is significantly worse.  Based upon age and body mass index, exercise capacity is less than predicted.

## 2019-01-09 NOTE — PROGRESS NOTES
Transplant Recipient Adult Psychosocial Assessment    Juanita Ibarra  [address] [place] [address]  Telephone Information:   Mobile [phone]   Home  [phone] (home)  Work  There is no work phone number on file.  E-mail  [email or web address]    Sex: female  YOB: 1990  Age: 28 y.o.      Pt permanent address is [address] [address]    Encounter Date: 1/9/2019  [place] Citizen: yes  Primary Language: English   Needed: no    Emergency Contact:  Name: Raj Castrejon (35)  Relationship: significant other  Address: [address] [address]  Phone Numbers:  [phone] (mobile)    Family/Social Support:   Number of dependents/: 0  Marital history: single  Other family dynamics: Pt presents in clinic for Lung transplant work up with SW. Pt presents with s/o Shawn who reports will be primary caregiver. Pt previously transplanted 4 1/2 years ago. Pt is very familiar with process and reports good support from family and s/o.       Household Composition:  Name: Gisel Ibarra  Age: 29  Relationship: patient  Does person drive? yes    Name: Ty Castrejon  Age: 36  Relationship: spouse  Does person drive? yes    Do you and your caregivers have access to reliable transportation? yes  PRIMARY CAREGIVER: Raj Castrejon, pt's s/o, reports will be primary caregiver, phone number [phone].      provided in-depth information to patient and caregiver regarding pre- and post-transplant caregiver role.   strongly encourages patient and caregiver to have concrete plan regarding post-transplant care giving, including back-up caregiver(s) to ensure care giving needs are met as needed.    Patient and Caregiver states understanding all aspects of caregiver role/commitment and is able/willing/committed to being caregiver to the fullest extent necessary.    Patient and Caregiver verbalizes understanding of the education provided today and  caregiver responsibilities.         remains available. Patient and Caregiver agree to contact  in a timely manner if concerns arise.      Able to take time off work without financial concerns: yes. Spouse reports is able to work from home    Additional Significant Others who will Assist with Transplant:  Name: Kim Ibarra  Age: 56  City: [place] State: [place]  Relationship: mother  Does person drive? yes  Ph# [phone]    Name: Mariel Calero  Age: 51  City: [place]: [place]  Relationship: Aunt  Does person drive? yes  Ph#     Name: Marylin Castrejon  Age: 59  City: [place]: [place]  Relationship: s/o mother  Does person drive? yes  Ph# [phone]    Living Will: yes pt's s/o Raj Castrejon is HCPA ph# [phone]  Healthcare Power of : yes  Advance Directives on file: <Received per medical record.  Verbally reviewed LW/HCPA information.   provided patient with copy of LW/HCPA documents and provided education on completion of forms.    Highest Education Level: Attended College/Technical School  Reading Ability: college  Denies difficulty with: reading, writing, seeing, hearing, comprehension, learning and memory  Learns Best By:  Written material/Verbal explanation/Demonstration/ Hands on practice     Status: no  VA Benefits: no     Working for Income: No  If no, reason not working: Disability  Patient is disabled.  Prior to disability, patient was in school    Spouse/Significant Other Employment:     Disabled: Pt has not worked enough quarters for Social Security Disability. Pt states SSI began when pt was 14    Monthly Income:   - pt is in separate household    Able to afford all costs now and if transplanted, including medications: yes friends and family agree to assist with funds if needed. However pt states is very motivated to fundraise.    Patient and Caregiver verbalizes understanding of personal responsibilities related  to transplant costs and the importance of having a financial plan to ensure that patients transplant costs are fully covered.       provided fundraising information/education. Patient and Caregiververbalizes understanding.   remains available.    Insurance:   Payor/Plan Subscr  Sex Relation Sub. Ins. ID Effective Group Num   1. [employer]* ELOISE WILLIAM* 1990 Female Self [id number]                                    [address] [address]   2. MEDICAID - * ELOISE WILLIAM* 1990 Female  [id number] LABY                                   [address] [address]     Primary Insurance (for UNOS reporting): Public Insurance - Medicaid  Secondary Insurance (for UNOS reporting): None  Patient and Caregiver verbalizes clear understanding that patient may experience difficulty obtaining and/or be denied insurance coverage post-surgery. This includes and is not limited to disability insurance, life insurance, health insurance, burial insurance, long term care insurance, and other insurances.      Patient and Caregiver also reports understanding that future health concerns related to or unrelated to transplantation may not be covered by patient's insurance.  Resources and information provided and reviewed.     Patient and Caregiver provides verbal permission to release any necessary information to outside resources for patient care and discharge planning.  Resources and information provided are reviewed.      Dialysis: Pt reports no history  Infusion Service: patient utilizing? yes with [facility] [phone]/ fx [phone]   Home Health: patient utilizing? no - pt able to access and de-access port on own. Pt family does self infusions and port care  DME: yes O2 through [facility]  Pulmonary/Cardiac Rehab: Pt referred to [facility] in [place] (ph# [phone]/ fx [phone])  ADLS:  Pt reports no issues with walking, cooking, eating, bathing, housekeeping,  and shopping. Pt reports to driving.     Adherence:   Patient is aware that Lung team will not monitor pt's pain medications and mental health medications post transplant long term. Pt does have chronic pain and will need to find provider upon moving down to [place]. Adherence education and counseling provided. 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     Social History     Substance and Sexual Activity   Drug Use No     Social History     Substance and Sexual Activity   Sexual Activity Yes    Partners: Male    Birth control/protection: Implant       Per Today's Psychosocial:  Tobacco: none, patient denies any use.  Alcohol: none, patient denies any use.  Illicit Drugs/Non-prescribed Medications: Pt currently prescribed many pain medications that are currently managed by her PCP     Patient and Caregiver states clear understanding of the potential impact of substance use as it relates to transplant candidacy and is aware of possible random substance screening.  Substance abstinence/cessation counseling, education and resources provided and reviewed.     Arrests/DWI/Treatment/Rehab: patient denies    Psychiatric History:     Mental Health: Pt reports suffers from much Anxiety that it does sometimes affect her daily functioning. Pt currently on mental health medications that are monitored by her PCP however states that sometimes she feels that she needs a change of medications. Pt agreeable to meet with [facility] in order to establish a mental health provider for when pt moves to [place] (pt scheduled to move to [place] next month).  Psychiatrist/Counselor: None at this time. Pt agreeable the need to establish care with mental health provider. Message request sent to Dr. Alvarez/Sylvia Gunter RN  Medications:  Effexor , Ability, Ativan  Suicide/Homicide Issues: Pt denies any recent, past, or current feelings of suicide or homicidal issues.   Safety at home: No issues reported or indicated.     Knowledge: Patient and Caregiver states having clear understanding and realistic expectations regarding the potential risks and potential benefits of organ transplantation and organ donation and agrees to discuss with health care team members and support system members, as well as to utilize available resources and express questions and/or concerns in order to further facilitate the pt informed decision-making.  Resources and information provided and reviewed.    Patient and Caregiver is aware of [facility]'s affiliation and/or partnership with agencies in home health care, LTAC, SNF, [facility], and other hospitals and clinics.    Understanding: Patient and Caregiver reports having a clear understanding of the many lifetime commitments involved with being a transplant recipient, including costs, compliance, medications, lab work, procedures, appointments, concrete and financial planning, preparedness, timely and appropriate communication of concerns, abstinence (ETOH, tobacco, illicit non-prescribed drugs), adherence to all health care team recommendations, support system and caregiver involvement, appropriate and timely resource utilization  and follow-through, mental health counseling as needed/recommended, and patient and caregiver responsibilities.  Social Service Handbook, resources and detailed educational information provided and reviewed.  Educational information provided.    Patient and Caregiver also reports current and expected compliance with health care regime and states having a clear understanding of the importance of compliance.      Patient and Caregiver reports a clear understanding that risks and benefits may be involved with organ transplantation and with organ donation.       Patient and Caregiver also reports clear understanding that psychosocial risk factors may affect patient, and include but are not limited to feelings of depression, generalized anxiety, anxiety regarding dependence on others, post traumatic stress disorder, feelings of guilt and other emotional and/or mental concerns, and/or exacerbation of existing mental health concerns.  Detailed resources provided and discussed.      Patient and Caregiver agrees to access appropriate resources in a timely manner as needed and/or as recommended, and to communicate concerns appropriately.  Patient and Caregiver also reports a clear understanding of treatment options available.     Patient and Caregiver received education in a group setting.   reviewed education, provided additional information, and answered questions.    Feelings or Concerns: Pt did voice concern of needing to find new providers upon moving to [place] to manage her mental health and pain meds (all currently being prescribed/monitored by PCP). SW provided resources and contacts. SW requesting psych referral to [facility] so pt can establish care locally     Coping: Pt coping adequately with transplant process at this time.     Goals: To improve quality of life by discontinuing dependence on O2 through Lung transplant and to spend more time with family.      Interview Behavior: Patient and  Caregiver presents as alert and oriented x 4, pleasant, good eye contact, well groomed, recall good, concentration/judgement good and asking and answering questions appropriately. Pt provided permission for s/o to remain in room during entire interview.          Transplant Social Work - Candidacy  Assessment/Plan:     Psychosocial Suitability: SW recommends pt establish with psychiatry prior to being listed. Once pt is established Patient will present as a suitable candidate for lung transplant at this time. Based on psychosocial risk factors, patient presents as low risk, due to pt: reports adequate caregiver/transportation plan, reports financial ability to afford transplant, reports compliance with current medical regimen, reports adequate functional status - currently independent with ADLs, no reported cognitive/developmental/behavioral impairments,  no reported substance use/abuse, and reports general understanding and motivation for transplant with adequate coping skills.       Recommendations/Additional Comments: Pt resides locally, fundraising discussed and encouraged.     Lacey Gomez LMSW

## 2019-01-09 NOTE — LETTER
January 9, 2019        Shama Dennison  [address]  [address]  [facility]  [place] [address]  Phone: [phone]  Fax: [phone]             [facility]  [address]  [place] [address]  Phone: [phone]   Patient: Juanita Ibarra   MR Number: [id number]   YOB: 1990   Date of Visit: 1/9/2019       Dear Dr. Shama Dennison    Thank you for referring Juanita Ibarra to me for evaluation. Attached you will find relevant portions of my assessment and plan of care.    If you have questions, please do not hesitate to call me. I look forward to following Juanita Ibarra along with you.    Sincerely,    Jake Alvarez MD    Enclosure    If you would like to receive this communication electronically, please contact [email or web address] or [phone] to request eReceipts Link access.    eReceipts Link is a tool which provides read-only access to select patient information with whom you have a relationship. Its easy to use and provides real time access to review your patients record including encounter summaries, notes, results, and demographic information.    If you feel you have received this communication in error or would no longer like to receive these types of communications, please e-mail [email or web address]

## 2019-01-11 NOTE — TELEPHONE ENCOUNTER
----- Message from Lacey Gomez LMSW sent at 1/8/2019  8:55 AM CST -----  If not already scheduled I think it would be important to scheduled Leigh with Psych as part of work up.  It should be covered as part of the bundle of work up for her insurance.     Received orders to schedule an appointment with psychiatry as part of the transplant evaluation per Dr. Alvarez.

## 2019-01-11 NOTE — TELEPHONE ENCOUNTER
Pt called c/o sore throat and ear pain. Contacted  Dr. Morin who recommended augmentin but patient states that she is allergic.  Dr. Morin at this time recommends watching the symptoms and if it gets worse to go to the ED.  Patient has been notified of Dr. Morin's recommendation and verbalized understanding.

## 2019-01-11 NOTE — TELEPHONE ENCOUNTER
----- Message from Jake Alvarez MD sent at 1/10/2019  9:25 AM CST -----  She corrects to 66 by MDRD. I would check a nuclear study.    Contacted Gisel and notified her of the 24 hour urine results and Dr. Alvarez's orders for a nuclear study to accurately assess her kidney function.  Informed her that we will attempt to schedule the test when she comes back in a couple of weeks.  She verbalized her understanding.      Also informed her of Lacey's recommendations and Dr. Alvarez's agreement for a psychiatry consult.  Informed her that we will schedule an appointment, but it may not be for several months.  She again verbalized her understanding.    Inquired if she received my message regarding the dental clearance.  She said that she had not.  Informed her that her insurance does require dental clearance.  Informed her that a message was sent to Dr. Alvarez regarding the dental clearance since she mentioned that she would have difficulty affording a dental evaluation.  Informed her that I would let her know what Dr. Alvarez recommends.  She again verbalized her understanding.

## 2019-01-14 NOTE — TELEPHONE ENCOUNTER
Orders received for IV cefepime 2 GM every 8 hours x 14 days and IV Micafungin 100 mg daily x 14 days.     Pt prefers to infuse and provide port changes herself. Pt currently established with [facility] (ph#[phone]/ fx [phone]). LOKI faxed orders and confirmed rcpt with Ara who states will run benefits and follow up with pt. Ara to follow up with LOKI re: any issues that may arise.  RN coordinator informed. LOKI to remains available.

## 2019-01-14 NOTE — TELEPHONE ENCOUNTER
"Received call from the patient who stated she spoke to her coordinator, Aletha on 1/11/19 w/ report of sore throat, headache, earache - was told to monitor s/s since she is allergic to PCN and couldn't take recommended Augmentin. Stated that those s/s have resolved but she now has "chest congestion" w/ very thick yellow sputum and is worried about developing "pneumonia". Patient stated on Friday during the conversation re: her s/s, she was instructed to go to the ED if her s/s worsened. Patient stated that she had planned to sit in her PCP's office today until she could be seen and didn't "trust" her local ED. Informed that a message would be sent to Dr. Morin and she would be re-contacted with the plan. Verbalized her understanding.    Received the following written orders from Dr. Morin: Cefepime 2g IV Q8 hours and Micafungin 100mg IV daily for 14 days with weekly CBC and CMP.  Contacted the patient with plan which she was very much in agreement with - stated that she self infuses via her port a cath and she and her  can also perform dressing changes. Will have outpatient CBC and CMP on 1/17/19 and 1/22/19. Orders for IV medications given to LOKI Wiley who will fax to [facility].   "

## 2019-01-17 NOTE — TELEPHONE ENCOUNTER
LOKI f/u with Isamar at [facility] in [place] (ph# [phone]/ fx [phone]) re:   referral made on 1/14. Isamar states order is processing and will follow up with pt.     Pt currently insured by Medicaid which does not cover the cost of Pulmonary Rehab. Isamar reports that facility has a finical assistance program to help cover the costs of attending for Medicaid pts. LOKI informed and discussed with pt prior to referral. SW remains available

## 2019-01-18 NOTE — TELEPHONE ENCOUNTER
Pt left for return call for LOKI re: fundraising. Pt had additional questions re: how to set up an account. SW discussed and provided resources. Pt states is closing on a house in [place] 2/15/19 in order to move closer for transplant. Pt states has not yet found a PCP to transfer to. SW did inform pt of a pain management clinic [facility] (ph# [phone]) that accepts pt's Medicaid. Pt states coping adequately at this time. No additional questions voiced at this time.

## 2019-01-22 NOTE — Clinical Note
The PA catheter is repositioned to the main pulmonary artery. Hemodynamics performed. Cardiac output obtained at 4 L/min. Oxygen saturation obtained at 58%.

## 2019-01-22 NOTE — H&P
Subjective:      Juanita Ibarra is a 29 y.o. female with a who needs RHC for evaluation of cardiac hemodynamics.  Currently able to lay flat.      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     Past Surgical History:   Procedure Laterality Date    ABDOMINAL SURGERY      peg tube     [id number]-[id number] N/A 5/19/2015    Performed by Deny Dias Jr., MD at [facility] OR    BIOPSY-BRONCHUS N/A 12/20/2016    Performed by Jake Alvarez MD at [facility] OR 2ND FLR    BRONCHOSCOPY Bilateral 12/11/2018    Performed by Francisca Morin DO at [facility] OR 2ND [facility]LR    BRONCHOSCOPY N/A 10/1/2018    Performed by Jake Alvarez MD at [facility] OR 2ND [facility]LR    BRONCHOSCOPY Bilateral 12/20/2016    Performed by Jake Alvarez MD at [facility] OR 2ND [facility]    BRONCHOSCOPY - flexible bronchoscopy with probable tissue biopsy CPT 86540 N/A 7/21/2015    Performed by Jake Alvarez MD at [facility] OR 2ND [facility]LR    BRONCHOSCOPY - flexible bronchoscopy with probable tissue biospy CPT 50170 N/A 10/20/2015    Performed by Jake Alvarez MD at [facility] OR 2ND [facility]LR    BRONCHOSCOPY - flexible bronchoscopy with tissue biopsy CPT 91273 N/A 9/29/2015    Performed by Jake Alvarez MD at [facility] OR 2ND [facility]LR    BRONCHOSCOPY - flexible bronchoscopy with tissue biopsy CPT 99625 N/A 5/26/2015    Performed by Jake Alvarez MD at [facility] OR 1ST FLR    BRONCHOSCOPY flexible bronchoscopy with tissue biopsy N/A 9/8/2014    Performed by Jake Alvarez MD at [facility] OR 2ND FLR    BRONCHOSCOPY flexible with possible tissue biopsy N/A 8/8/2014     Performed by Jake Alvarez MD at [facility] OR [address] [place] [address]    BRONCHOSCOPY, BAL, BIOPSIES N/A 3/20/2018    Performed by Jake Alvarez MD at [facility] OR [address] [place] [address]    BRONCHOSCOPY-FIBEROPTIC N/A 1/29/2016    Performed by Joann Cifuentes DO at [facility] OR [address] [place] [address]    BRONCHOSCOPY; Bronchoscopy with BAL and transbronchial biopsies under general anesthesia N/A 5/29/2018    Performed by Jake Alvarez MD at [facility] OR [address] [place] [address]    CHOLECYSTECTOMY  2016    CHOLECYSTECTOMY-LAPAROSCOPIC N/A 7/22/2016    Performed by Joshua Goldberg, MD at [facility] OR [address] [place] [address]    ENDOMETRIAL ABLATION  2015    KJB    FESS      FESS Bilateral 10/21/2013    Performed by Jared Whatley MD at [facility] OR [address] [place] [address]    FINE NEEDLE ASPIRATION (FNA)  of a cervical lymphadenopathy  1/29/2016    Performed by Joann Cifuentes DO at [facility] OR [address] [place] [address]    flex bronchoscopy with probable tissue biopsy N/A 7/31/2014    Performed by [facility] Diagnostic Provider at [facility] OR [address] [place] [address]    flexible bronchoscopy with tissue biopsy N/A 12/11/2014    Performed by Jake Alvarez MD at [facility] OR [address] [place] [address]    INJECTION-VOCAL CORD Left 6/24/2014    Performed by Jared Whatley MD at [facility] OR [address] [place] [address]    CTYOEFNRM-TVBP-E-CATH to right chest and removal of portacath to left chests wall. Bilateral 6/24/2014    Performed by Zhen Dorado MD at [facility] OR [address] [place] [address]    LARYNX SURGERY  2016    LUNG TRANSPLANT Bilateral 6/12/14    MYRINGOTOMY W/ TUBES Right 04/2017    MYRINGOTOMY WITH INSERTION OF PE TUBES Right 4/15/2017    [facility]    PLACEMENT-TUBE-PEG  5/15/2014    [facility]    PORTACATH PLACEMENT Right     rt sc    REMOVAL-TUBE-PEG  5/15/2014    [facility]    ROBOTIC ASSISTED LAPAROSCOPIC HYSTERECTOMY N/A 4/25/2017    Performed by [facility] at [facility] OR    SALPINGECTOMY Bilateral 2015    KJB    SALPINGECTOMY-LAPAROSCOPIC Bilateral  5/19/2015    Performed by [facility] at [facility] OR    SINUS SURGERY FUNCTIONAL ENDOSCOPIC WITH NAVIGATION Bilateral 4/3/2017    Performed by Cesar Olsen MD at [facility] OR [address] [place] [address]    SINUS SURGERY FUNCTIONAL ENDOSCOPIC WITH NAVIGATION, 01176, 34835, 33537, 47317 Bilateral 2/5/2015    [facility]    THYROPLASTY - Medialization laryngoplasty, cricothyroid subluxation, arytenoid repositioning Left 8/2/2016    Performed by Gary Null MD at [facility] OR [address] [place] [address]    TRANSPLANT-LUNG Bilateral 6/11/2014    Performed by Adolfo Huston MD at [facility] OR [address] [place] [address]     Social History     Socioeconomic History    Marital status: Single     Spouse name: Not on file    Number of children: Not on file    Years of education: Not on file    Highest education level: Not on file   Social Needs    Financial resource strain: Not on file    Food insecurity - worry: Not on file    Food insecurity - inability: Not on file    Transportation needs - medical: Not on file    Transportation needs - non-medical: Not on file   Occupational History    Not on file   Tobacco Use    Smoking status: Never Smoker    Smokeless tobacco: Never Used   Substance and Sexual Activity    Alcohol use: No     Comment: Has not had an alcoholic drink in more than 2 months.    Drug use: No    Sexual activity: Yes     Partners: Male     Birth control/protection: Implant   Other Topics Concern    Not on file   Social History Narrative    Not on file     Family History   Problem Relation Age of Onset    Thrombocytopenia Maternal Grandfather     Drug abuse Maternal Grandfather     Breast cancer Neg Hx     Colon cancer Neg Hx     Ovarian cancer Neg Hx            Other significant clinical information:  Review of patient's allergies indicates:   Allergen Reactions    Albuterol Palpitations    Colistin Anaphylaxis    Vancomycin analogues      Infusion reaction that does not resolve with slowing     Neupogen [filgrastim] Other (See Comments)     Ostealgia after five daily doses of 300 mcg.      Bactrim [sulfamethoxazole-trimethoprim] Hives    Ceftazidime Hives     Pt stated can tolerate cefapine not ceftazidime    Ceftazidime     Dronabinol Other (See Comments)     Mental changes/hallucinations    Haldol [haloperidol lactate] Other (See Comments)     Seizure like activity    Nsaids (non-steroidal anti-inflammatory drug)      Cannot have due to lung transplant    Adhesive Rash     Cloth tape- please use tegaderm or paper tape    Aztreonam Rash    Ciprofloxacin Nausea And Vomiting     Projectile N/V, per patient.  Unwilling to retry therapy.     No current facility-administered medications on file prior to encounter.      Current Outpatient Medications on File Prior to Encounter   Medication Sig    amLODIPine (NORVASC) 10 MG tablet Take 1 tablet (10 mg total) by mouth once daily.    aripiprazole (ABILIFY) 2 MG Tab Take 2 mg by mouth once daily.    calcium-vitamin D3 (OS-KATY 500 + D3) 500 mg(1,250mg) -200 unit per tablet Take 1 tablet by mouth 2 (two) times daily with meals.    dapsone 100 MG Tab Take 1 tablet (100 mg total) by mouth once daily.    diphenhydrAMINE (BENADRYL) 50 MG tablet Take 1 tablet (50 mg total) by mouth every 6 (six) hours as needed for Itching.    DULERA 100-5 mcg/actuation HFAA INHALE 1 PUFF BY MOUTH TWICE DAILY    fexofenadine (ALLEGRA) 180 MG tablet Take 180 mg by mouth once daily.    fluconazole (DIFLUCAN) 150 MG Tab TAKE 1 TABLET BY MOUTH EVERY WEEK    fluticasone (FLONASE) 50 mcg/actuation nasal spray INSERT 2 SPRAYS IN EACH NOSTRIL DAILY    folic acid (FOLVITE) 1 MG tablet Take 1 tablet (1,000 mcg total) by mouth once daily.    gabapentin (NEURONTIN) 300 MG capsule Take 1 capsule (300 mg total) by mouth 3 (three) times daily.    inhalation spacing device (PROCHAMBER) USE AS DIRECTED    levalbuterol (XOPENEX) 1.25 mg/3 mL nebulizer solution Take 3 mLs (1.25 mg  total) by nebulization every 8 (eight) hours as needed for Wheezing or Shortness of Breath. Rescue    lipase-protease-amylase (CREON) 36,000-114,000- 180,000 unit CpDR Take 4 capsules by mouth 3 (three) times daily with meals. Take 3 with snacks prn. (Patient taking differently: Take 5 capsules by mouth 3 (three) times daily with meals. Take 3 with snacks prn.)    LORazepam (ATIVAN) 1 MG tablet Take 2 mg by mouth every 6 (six) hours as needed for Anxiety.     magnesium oxide (MAG-OX) 400 mg tablet Take 1 tablet (400 mg total) by mouth 2 (two) times daily.    metoprolol tartrate (LOPRESSOR) 25 MG tablet Take 1 tablet (25 mg total) by mouth 2 (two) times daily.    montelukast (SINGULAIR) 10 mg tablet Take 1 tablet (10 mg total) by mouth once daily.    morphine (MS CONTIN) 15 MG 12 hr tablet Take 15 mg by mouth 2 (two) times daily.     multivit,min52-folic-vitK-cQ10 (AQUADEKS) 100-700-10 mcg-mcg-mg Cap cap 1 tab twice daily    mycophenolate (CELLCEPT) 250 mg Cap Take 2 capsules (500 mg total) by mouth 2 (two) times daily.    ondansetron (ZOFRAN) 8 MG tablet Take 1 tablet (8 mg total) by mouth every 12 (twelve) hours as needed for Nausea.    oxycodone (ROXICODONE) 5 MG immediate release tablet Take 10 mg by mouth every 4 (four) hours as needed for Pain.    polyethylene glycol (GLYCOLAX) 17 gram PwPk Take 17 g by mouth 2 (two) times daily.    predniSONE (DELTASONE) 5 MG tablet Take 1 tablet (5 mg total) by mouth once daily.    promethazine (PHENERGAN) 25 MG tablet Take 1 tablet (25 mg total) by mouth every 6 (six) hours as needed for Nausea.    tacrolimus (PROGRAF) 0.5 MG Cap Take 1 capsule (0.5 mg total) by mouth once daily.    tacrolimus (PROGRAF) 1 MG Cap Take 2 capsules (2 mg total) by mouth every 12 (twelve) hours. Daily doses: 2.5 mg BID    topiramate (TOPAMAX) 25 MG tablet Take 1 tablet (25 mg total) by mouth 2 (two) times daily.            Objective:      Physical Exam    BP  134 / 93 P  80    General Appearance:    Alert, cooperative, no distress, appears stated age   Head:    Normocephalic, without obvious abnormality, atraumatic   Eyes:    PERRL, conjunctiva/corneas clear, EOM's intact, fundi     benign, both eyes   Ears:    Normal TM's and external ear canals, both ears   Nose:   Nares normal, septum midline, mucosa normal, no drainage    or sinus tenderness   Throat:   Lips, mucosa, and tongue normal; teeth and gums normal   Neck:   Supple, symmetrical, trachea midline, no adenopathy;     thyroid:  no enlargement/tenderness/nodules; no carotid    bruit or JVD   Back:     Symmetric, no curvature, ROM normal, no CVA tenderness   Lungs:     Clear to auscultation bilaterally, respirations unlabored   Chest Wall:    No tenderness or deformity    Heart:    Regular rate and rhythm, S1 and S2 normal, no murmur, rub   or gallop   Breast Exam:    No tenderness, masses, or nipple abnormality   Abdomen:     Soft, non-tender, bowel sounds active all four quadrants,     no masses, no organomegaly   Genitalia:    Normal female without lesion, discharge or tenderness   Rectal:    Normal tone, no masses or tenderness;    guaiac negative stool   Extremities:   Extremities normal, atraumatic, no cyanosis or edema   Pulses:   2+ and symmetric all extremities   Skin:   Skin color, texture, turgor normal, no rashes or lesions   Lymph nodes:   Cervical, supraclavicular, and axillary nodes normal   Neurologic:   CNII-XII intact, normal strength, sensation and reflexes     throughout         Lab Review   Lab Results   Component Value Date    WBC 5.96 01/22/2019    HGB 9.4 (L) 01/22/2019    HCT 32.3 (L) 01/22/2019    MCV 93 01/22/2019     (L) 01/22/2019     Lab Results   Component Value Date    INR 1.0 01/22/2019    INR 1.0 11/26/2018    INR 1.0 10/26/2015           Assessment:       Plan:     I have explained the risks, benefits, and alternatives of the procedure in detail.  The patient expresses understanding  and all questions have been answered.  The patient agrees to the proceed as planned.  RHC  via RIJ   Micropuncture access needle will be used to minimize bleeding risk.

## 2019-01-22 NOTE — LETTER
January 22, 2019      Jake Alvarez MD  [address]  [facility] [address]           [facility]  [address]  [facility] [address]  Phone: [phone]  Fax: [phone]          Patient: Juanita Ibarra   MR Number: [id number]   YOB: 1990   Date of Visit: 1/22/2019       Dear Dr. Jake Alvarez:    Thank you for referring Juanita Ibarra to me for evaluation. Attached you will find relevant portions of my assessment and plan of care.    If you have questions, please do not hesitate to call me. I look forward to following Juanita Ibarra along with you.    Sincerely,    Kamryn Jose MD    Enclosure  CC:  No Recipients    If you would like to receive this communication electronically, please contact [email or web address] or [phone] to request more information on [employer] Link access.    For providers and/or their staff who would like to refer a patient to [facility], please contact us through our one-stop-shop provider referral line, [facility], at [phone].    If you feel you have received this communication in error or would no longer like to receive these types of communications, please e-mail [email or web address]

## 2019-01-22 NOTE — PROGRESS NOTES
"INFECT[facility]OUS DISEASE [facility]IN[facility]  01/22/2019 12:54 PM    Subjective:      Chief Complaint: No chief complaint on file.      History of Present Illness:    Patient Juanita Ibarra is a 29 y.o. 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         1/11/19 w/ report of sore throat, headache, earache - was told to monitor s/s since she is allergic to PCN and couldn't take recommended Augmentin. Stated that those s/s have resolved but she now has "chest congestion" w/ very thick yellow sputum and is worried about developing "pneumonia".    Dr Morin started IV cefepime 2 GM every 8 hours x 14 days and IV Micafungin 100 mg daily x 14 days on 1/14/18        Last seen by ID 12/8/18 during hospital stay for positive respiratory cultures, suspicious for colonization.    Patient *** any recent fever, chills, or infective illnesses.      Last CT chest 12/5/18:   Patient with history of cystic fibrosis status post bilateral sequential lung transplant demonstrating overall remarkable improvement in the previously seen nodular soft tissue opacities at the lung bases.  Appearance of the pulmonary parenchyma appears back to baseline as compared to December 2016.    New thickening of the posterior wall of the bronchus intermedius which may represent inflammation.    Respiratory culture 1/9/19:  Gram Stain (Respiratory) <10 epithelial cells per low power field.    Gram Stain (Respiratory) Few WBC's    Gram Stain (Respiratory) Many Gram negative rods    Gram Stain (Respiratory) Moderate Gram positive cocci       Pseudomonas aeruginosa     CULTURE, RESPIRATORY  - CYSTIC FIBROSIS     Amikacin <=16  Sensitive     Cefepime <=8  Sensitive     Ciprofloxacin <=1  Sensitive     Gentamicin <=4  Sensitive     Meropenem <=4  Sensitive     Piperacillin/Tazo <=16  Sensitive     Tobramycin <=4  Sensitive  "     Specimen Information: Bronchial Wash; Respiratory        Component 1mo ago   Fungus (Mycology) Culture BRANDI GLABRATA   Moderate       Fungus (Mycology) Culture BRANDI DUBLINIENSIS   Few                         1) Do you have a history of:         YES NO   Diabetes   []        []     Autoimmune disease  []        []   Cancer               []        []   Surgical Removal of Spleen []        []       2) Have you had recurrent infections:             YES NO  Sinus infections  []        []   Lung infections  []        []              Urinary Tract Infections []        []                                              Intestinal Infections  []        []      Skin Infections   []        []       Musculoskeletal Infections    []        []   Reproductive Infections []        []   Periodontal Disease  []        []        3)Have you ever had: YES     NO       Chicken Pox   []         []          Shingles   []         []            Orolabial Herpes             []         []          Genital Herpes  []         []           Genital Warts   []         []             Cytomegalovirus  []         []          Dean-Barr Virus  []         []              Hepatitis A   []         []          Hepatitis B   []         []          Hepatitis C   []         []            Syphilis   []         []          Gonorrhea   []         []         Chlamydia    []         []           Parasites / worms  []         []         Fungal Infections  []         []         Bloodstream Infections []         []             4) Tuberculosis             YES NO  Exposure to person with active TB?  []         []   Have you ever had a positive PPD?      []         []   If yes, what treatment did you receive:     5) Travel    What states have you lived in for more than a month?    What countries have you visited for more than 2 weeks?                                   YES     NO  Did you have any associated infections?   []         []     Are you planning to  travel outside of [place] after transplant?    []          []     6) Animal Exposure                  YES NO  Do you have pets living in your house?   []         []   If yes, describe:     Do you spend time or live on a farm?    []         []   If yes, which ones:    Do you have a fish tank?         []  []    Do you have a litter box?     []         []     Do you fish or hunt?      []         []   Do you clean or skin fish or animals?    []         []     Consume raw or undercooked meat, fish, shellfish?  []         []       7) What occupations have you had?          8) Hobbies          What hobbies do you have?             YES     NO  Do you garden or otherwise work in the soil?  []         []   Do you hike, camp, or spend time in wooded areas?  []         []       9) The patient's immunization history was reviewed.     Have you ever received:  YES NO DATES  Routine Childhood vaccines  []         []       Influenza vaccine   []         []     Prevnar    []         []     Pneumovax    []         []     Tetanus-diptheria -pertussis  []         []     Hepatitis A vaccine series       []         []     Hepatitis B vaccine series         []         []     Meningitis vaccine   []         []     Zoster vaccine    []         []        Significant Labs Reviewed:    HepA Ab   HepBc Ab   HepBs Ag   HepBs Ab   HepC Ab   HIV   RPR   Quant gold  Strongy   Schistosomiasis    HIV: No components found for: HIV 1/2 AG/AB  Hepatitis C IgG: No components found for: HEPATITIS C  Syphilis:   RPR   Date Value Ref Range Status   01/09/2019 Non-reactive Non-reactive Final   10/15/2013 Non-reactive Non-reactive Final       Hepatitis A IgG: No components found for: HEPATITIS A IGG  Hepatitis Bc IgG: No components found for: HEPATITIS B CORE IGG  Hepatitis Bs IgG:  Quantiferon:   Quantiferon Gold TB   Date Value Ref Range Status   10/16/2013 Indeterminate (A) Negative Final     Comment:     Positive test result.   M. tuberculosis complex infection  likely. M. kansasii,   M. marinum, and M. szulgai infection cannot be ruled out.     Toxoplasmosis: No results found for: TOXOPLASMA  VZV IgG: No components found for: VARICELLA IGG    No components found for: SEDIMENTATION RATE  No components found for: C-REACTIVE PROTEIN      Microbiology x 7d:   Microbiology Results (last 7 days)     ** No results found for the last 168 hours. **          Immunization History   Administered Date(s) Administered    Cytomegalovirus Immune Globulin 06/13/2014       Pending Labs:    Review of Symptoms:  Constitutional: Denies fevers, chills, or weakness.  ENT: Denies dysphagia, nasal discharge, ear pain or discharge.  Cardiovascular: Denies chest pain, palpitations, orthopnea, or claudication.  Respiratory: Denies shortness of breath, cough, hemoptysis, or wheezing.  GI: Denies nausea/vomitting, hematochezia, melena, abd pain, or changes in appetite.  : Denies dysuria, incontinence, or hematuria.  Musculoskeletal: Denies joint pain or myalgias.  Skin/breast: Denies rashes, lumps, lesions, or discharge.  Neurologic: Denies headache, dizziness, vertigo, or paresthesias.    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       Past Surgical History:   Procedure Laterality Date    ABDOMINAL SURGERY      peg tube     [id number] N/A 5/19/2015    Per[facility]    BIOPSY-BRONCHUS N/A 12/20/2016    [facility]    BRONCHOSCOPY Bilateral 12/11/2018    [facility]     BRONCHOSCOPY N/A 10/1/2018    Performed by [facility] at [facility] OR [facility]    BRONCHOSCOPY Bilateral 12/20/2016    Performed by [facility] at [facility]    BRONCHOSCOPY - flexible bronchoscopy with probable tissue biopsy CPT 16011 N/A 7/21/2015    Performed by [facility]varez MD at [facility] OR [facility]    BRONCHOSCOPY - flexible bronchoscopy with probable tissue biospy CPT 97854 N/A 10/20/2015    Performed by Jake [facility]varez MD at [facility] OR [facility]    BRONCHOSCOPY - flexible bronchoscopy with tissue biopsy CPT 92820 N/A 9/29/2015    Performed by [facility] at [facility]    BRONCHOSCOPY - flexible bronchoscopy with tissue biopsy CPT 57236 N/A 5/26/2015    Performed by Jake [facility]varez MD at [facility] OR [facility]    BRONCHOSCOPY flexible bronchoscopy with tissue biopsy N/A 9/8/2014    Performed by [facility]varez MD at [facility]    BRONCHOSCOPY flexible with possible tissue biopsy N/A 8/8/2014    Performed by [facility]varez MD at [facility]    BRONCHOSCOPY, BAL, BIOPSIES N/A 3/20/2018    Performed by Jake [facility]varez MD at [facility] OR [facility]    BRONCHOSCOPY-FIBEROPTIC N/A 1/29/2016    Performed by Joann Cifuentes DO at [facility] OR [facility]    BRONCHOSCOPY; Bronchoscopy with BAL and transbronchial biopsies under general anesthesia N/A 5/29/2018    [facility]    CHOLECYSTECTOMY  2016    CHOLECYSTECTOMY-LAPAROSCOPIC N/A 7/22/2016    Performed by Joshua Goldberg, MD at [facility] OR [facility]    ENDOMETRIAL ABLATION  2015    KJB    FESS      FESS Bilateral 10/21/2013    Performed by Jared Whatley MD at [facility] OR [facility]    FINE NEEDLE ASPIRATION (FNA)  of a cervical lymphadenopathy  1/29/2016    Performed by Joann Cifuentes DO at [facility] OR [facility]    flex bronchoscopy with probable tissue biopsy N/A 7/31/2014    [facility] at [facility] OR [facility]    flexible bronchoscopy with tissue biopsy  N/A 12/11/2014    [facility]    INJECTION-VOCAL CORD Left 6/24/2014    Performed by Jared Whatley MD at [facility] OR [address] [place] [address]    [id number] to right chest and removal of portacath to left chests wall. Bilateral 6/24/2014    [facility]hen [facility]ado [facility]    LARYNX SURGERY  2016    LUNG TRANSPLANT Bilateral 6/12/14    MYRINGOTOMY W/ TUBES Right 04/2017    MYRINGOTOMY WITH INSERTION OF PE TUBES Right 4/15/2017    Performed by Gary Null MD at [facility]    PLACEMENT-TUBE-PEG  5/15/2014    Performed by David Moses MD at [facility] OR [address] [place] [address]    PORTACATH PLACEMENT Right     rt sc    REMOVAL-TUBE-PEG  5/15/2014    Performed by David Moses MD at [facility] OR [address] [place] [address]    ROBOTIC ASSISTED LAPAROSCOPIC HYSTERECTOMY N/A 4/25/2017    [facility]    SALPINGECTOMY Bilateral 2015    KJB    SALPINGECTOMY-LAPAROSCOPIC Bilateral 5/19/2015    Performed by [facility]y Dias Jr[facility] at [facility] OR    SINUS SURGERY FUNCTIONAL ENDOSCOPIC WITH NAVIGATION Bilateral 4/3/2017    Performed by Cesar Olsen MD at [facility] OR [address] [place] [address]    SINUS SURGERY FUNCTIONAL ENDOSCOPIC WITH NAVIGATION, 36607, 89880, 46472, 71613 Bilateral 2/5/2015    Performed by Cesar Olsen MD at [facility] OR [address] [place] [address]    THYROPLASTY - Medialization laryngoplasty, cricothyroid subluxation, arytenoid repositioning Left 8/2/2016    Per[facility] Gary [facility]    TRANSPLANT-LUNG Bilateral 6/11/2014    Performed by Adolfo Huston MD at [facility] OR [address] [place] [address]       Family History   Problem Relation Age of Onset    Thrombocytopenia Maternal Grandfather     Drug abuse Maternal Grandfather     Breast cancer Neg Hx     Colon cancer Neg Hx     Ovarian cancer Neg Hx        Social History     Socioeconomic History    Marital status: Single     Spouse name: Not on file    Number of children: Not on file    Years of  education: Not on file    Highest education level: Not on file   Social Needs    Financial resource strain: Not on file    Food insecurity - worry: Not on file    Food insecurity - inability: Not on file    Transportation needs - medical: Not on file    Transportation needs - non-medical: Not on file   Occupational History    Not on file   Tobacco Use    Smoking status: Never Smoker    Smokeless tobacco: Never Used   Substance and Sexual Activity    Alcohol use: No     Comment: Has not had an alcoholic drink in more than 2 months.    Drug use: No    Sexual activity: Yes     Partners: Male     Birth control/protection: Implant   Other Topics Concern    Not on file   Social History Narrative    Not on file       Review of patient's allergies indicates:   Allergen Reactions    Albuterol Palpitations    Colistin Anaphylaxis    Vancomycin analogues      Infusion reaction that does not resolve with slowing    Neupogen [filgrastim] Other (See Comments)     Ostealgia after five daily doses of 300 mcg.      Bactrim [sulfamethoxazole-trimethoprim] Hives    Ceftazidime Hives     Pt stated can tolerate cefapine not ceftazidime    Ceftazidime     Dronabinol Other (See Comments)     Mental changes/hallucinations    Haldol [haloperidol lactate] Other (See Comments)     Seizure like activity    Nsaids (non-steroidal anti-inflammatory drug)      Cannot have due to lung transplant    Adhesive Rash     Cloth tape- please use tegaderm or paper tape    Aztreonam Rash    Ciprofloxacin Nausea And Vomiting     Projectile N/V, per patient.  Unwilling to retry therapy.         Objective:   VS (24h): There were no vitals filed for this visit.  General: Afebrile, alert, comfortable, no acute distress.   HEENT: JANE. EOMI, no scleral icterus. No sinus tenderness. MMM.  Pulmonary: Non labored,clear to auscultation A/P/L. No wheezing, crackles, or rhonchi.  Cardiac: normal S1 & S2 w/o rubs/murmurs/gallops. No JVD.    Abdominal: Non-tender, non-distended.Bowel sounds present x 4. No appreciable hepatosplenomegaly. No guarding or rebound tenderness  Extremities: Moves all extremities x 4. No peripheral edema. 2+ pulses.  Skin: No jaundice, rashes, or visible lesions.   Neurological:  Alert and oriented x 4.     Labs:  Glucose   Date Value Ref Range Status   01/22/2019 313 (H) 70 - 110 mg/dL Final   01/09/2019 97 70 - 110 mg/dL Final   01/09/2019 97 70 - 110 mg/dL Final     Calcium   Date Value Ref Range Status   01/22/2019 9.2 8.7 - 10.5 mg/dL Final   01/09/2019 8.8 8.7 - 10.5 mg/dL Final   01/09/2019 8.8 8.7 - 10.5 mg/dL Final     Albumin   Date Value Ref Range Status   01/22/2019 3.3 (L) 3.5 - 5.2 g/dL Final   01/09/2019 3.7 3.5 - 5.2 g/dL Final   01/09/2019 3.7 3.5 - 5.2 g/dL Final     Total Protein   Date Value Ref Range Status   01/22/2019 7.0 6.0 - 8.4 g/dL Final   01/09/2019 7.5 6.0 - 8.4 g/dL Final   01/09/2019 7.5 6.0 - 8.4 g/dL Final     Sodium   Date Value Ref Range Status   01/22/2019 136 136 - 145 mmol/L Final   01/09/2019 139 136 - 145 mmol/L Final   01/09/2019 139 136 - 145 mmol/L Final     Potassium   Date Value Ref Range Status   01/22/2019 4.5 3.5 - 5.1 mmol/L Final   01/09/2019 3.7 3.5 - 5.1 mmol/L Final   01/09/2019 3.7 3.5 - 5.1 mmol/L Final     CO2   Date Value Ref Range Status   01/22/2019 28 23 - 29 mmol/L Final   01/09/2019 23 23 - 29 mmol/L Final   01/09/2019 23 23 - 29 mmol/L Final     Chloride   Date Value Ref Range Status   01/22/2019 102 95 - 110 mmol/L Final   01/09/2019 107 95 - 110 mmol/L Final   01/09/2019 107 95 - 110 mmol/L Final     BUN, Bld   Date Value Ref Range Status   01/22/2019 18 6 - 20 mg/dL Final   01/09/2019 21 (H) 6 - 20 mg/dL Final   01/09/2019 21 (H) 6 - 20 mg/dL Final     Creatinine   Date Value Ref Range Status   01/22/2019 1.0 0.5 - 1.4 mg/dL Final   01/09/2019 0.9 0.5 - 1.4 mg/dL Final   01/09/2019 0.9 0.5 - 1.4 mg/dL Final     Alkaline Phosphatase   Date Value Ref Range  Status   01/22/2019 190 (H) 55 - 135 U/L Final   01/09/2019 244 (H) 55 - 135 U/L Final   01/09/2019 244 (H) 55 - 135 U/L Final     ALT   Date Value Ref Range Status   01/22/2019 43 10 - 44 U/L Final   01/09/2019 83 (H) 10 - 44 U/L Final   01/09/2019 83 (H) 10 - 44 U/L Final     AST   Date Value Ref Range Status   01/22/2019 29 10 - 40 U/L Final   01/09/2019 43 (H) 10 - 40 U/L Final   01/09/2019 43 (H) 10 - 40 U/L Final     Total Bilirubin   Date Value Ref Range Status   01/22/2019 0.2 0.1 - 1.0 mg/dL Final     Comment:     For infants and newborns, interpretation of results should be based  on gestational age, weight and in agreement with clinical  observations.  Premature Infant recommended reference ranges:  Up to 24 hours.............<8.0 mg/dL  Up to 48 hours............<12.0 mg/dL  3-5 days..................<15.0 mg/dL  6-29 days.................<15.0 mg/dL     01/09/2019 0.3 0.1 - 1.0 mg/dL Final     Comment:     For infants and newborns, interpretation of results should be based  on gestational age, weight and in agreement with clinical  observations.  Premature Infant recommended reference ranges:  Up to 24 hours.............<8.0 mg/dL  Up to 48 hours............<12.0 mg/dL  3-5 days..................<15.0 mg/dL  6-29 days.................<15.0 mg/dL     01/09/2019 0.3 0.1 - 1.0 mg/dL Final     Comment:     For infants and newborns, interpretation of results should be based  on gestational age, weight and in agreement with clinical  observations.  Premature Infant recommended reference ranges:  Up to 24 hours.............<8.0 mg/dL  Up to 48 hours............<12.0 mg/dL  3-5 days..................<15.0 mg/dL  6-29 days.................<15.0 mg/dL       WBC   Date Value Ref Range Status   01/22/2019 5.96 3.90 - 12.70 K/uL Final   01/09/2019 8.98 3.90 - 12.70 K/uL Final   12/12/2018 3.93 3.90 - 12.70 K/uL Final     Hemoglobin   Date Value Ref Range Status   01/22/2019 9.4 (L) 12.0 - 16.0 g/dL Final    01/09/2019 9.9 (L) 12.0 - 16.0 g/dL Final   12/12/2018 7.5 (L) 12.0 - 16.0 g/dL Final     POC Hematocrit   Date Value Ref Range Status   11/07/2018 23 (L) 36 - 54 %PCV Final   11/07/2018 27 (L) 36 - 54 %PCV Final   06/12/2014 31 (L) 36 - 54 %PCV Final     Hematocrit   Date Value Ref Range Status   01/22/2019 32.3 (L) 37.0 - 48.5 % Final   01/09/2019 33.3 (L) 37.0 - 48.5 % Final   12/12/2018 25.7 (L) 37.0 - 48.5 % Final     MCV   Date Value Ref Range Status   01/22/2019 93 82 - 98 fL Final   01/09/2019 89 82 - 98 fL Final   12/12/2018 96 82 - 98 fL Final     Platelets   Date Value Ref Range Status   01/22/2019 123 (L) 150 - 350 K/uL Final   01/09/2019 131 (L) 150 - 350 K/uL Final   12/12/2018 101 (L) 150 - 350 K/uL Final     Lab Results   Component Value Date    CHOL 129 01/09/2019    CHOL 157 06/29/2017    CHOL 144 05/19/2016     Lab Results   Component Value Date    HDL 48 01/09/2019    HDL 57 06/29/2017    HDL 63 05/19/2016     Lab Results   Component Value Date    LDLCALC 63.8 01/09/2019    LDLCALC 83.2 06/29/2017    LDLCALC 62.0 (L) 05/19/2016     Lab Results   Component Value Date    TRIG 86 01/09/2019    TRIG 84 06/29/2017    TRIG 95 05/19/2016     Lab Results   Component Value Date    CHOLHDL 37.2 01/09/2019    CHOLHDL 36.3 06/29/2017    CHOLHDL 43.8 05/19/2016     RPR   Date Value Ref Range Status   01/09/2019 Non-reactive Non-reactive Final   10/15/2013 Non-reactive Non-reactive Final     Quantiferon Gold TB   Date Value Ref Range Status   10/16/2013 Indeterminate (A) Negative Final     Comment:     Positive test result.   M. tuberculosis complex infection likely. M. kansasii,   M. marinum, and M. szulgai infection cannot be ruled out.       Medications:  Current Outpatient Medications on File Prior to Visit   Medication Sig Dispense Refill    acetaminophen (TYLENOL) 500 MG tablet Take 1,000 mg by mouth every 6 (six) hours as needed for Pain.      amLODIPine (NORVASC) 10 MG tablet Take 1 tablet (10 mg  total) by mouth once daily. 30 tablet 11    aripiprazole (ABILIFY) 2 MG Tab Take 2 mg by mouth once daily.      butalbital-acetaminophen-caffeine -40 mg (FIORICET, ESGIC) -40 mg per tablet Take 1 tablet by mouth every 6 (six) hours as needed for Pain.      calcium-vitamin D3 (OS-KATY 500 + D3) 500 mg(1,250mg) -200 unit per tablet Take 1 tablet by mouth 2 (two) times daily with meals. 60 tablet 11    dapsone 100 MG Tab Take 1 tablet (100 mg total) by mouth once daily. 30 tablet 11    diphenhydrAMINE (BENADRYL) 50 MG tablet Take 1 tablet (50 mg total) by mouth every 6 (six) hours as needed for Itching. 1 tablet 0    DULERA 100-5 mcg/actuation HFAA INHALE 1 PUFF BY MOUTH TWICE DAILY 13 g 0    fexofenadine (ALLEGRA) 180 MG tablet Take 180 mg by mouth once daily.      fluconazole (DIFLUCAN) 150 MG Tab TAKE 1 TABLET BY MOUTH EVERY WEEK 30 tablet 0    fluticasone (FLONASE) 50 mcg/actuation nasal spray INSERT 2 SPRAYS IN EACH NOSTRIL DAILY 16 g 5    folic acid (FOLVITE) 1 MG tablet Take 1 tablet (1,000 mcg total) by mouth once daily. 30 tablet 11    gabapentin (NEURONTIN) 300 MG capsule Take 1 capsule (300 mg total) by mouth 3 (three) times daily. 90 capsule 11    hydrOXYzine HCl (ATARAX) 10 MG Tab Take 25 mg by mouth 3 (three) times daily as needed.      inhalation spacing device (PROCHAMBabyList) USE AS DIRECTED 1 Device 0    levalbuterol (XOPENEX) 1.25 mg/3 mL nebulizer solution Take 3 mLs (1.25 mg total) by nebulization every 8 (eight) hours as needed for Wheezing or Shortness of Breath. Rescue 72 mL 5    lipase-protease-amylase (CREON) 36,000-114,000- 180,000 unit CpDR Take 4 capsules by mouth 3 (three) times daily with meals. Take 3 with snacks prn. (Patient taking differently: Take 5 capsules by mouth 3 (three) times daily with meals. Take 3 with snacks prn.) 800 capsule 11    LORazepam (ATIVAN) 1 MG tablet Take 2 mg by mouth every 6 (six) hours as needed for Anxiety.       magnesium oxide  (MAG-OX) 400 mg tablet Take 1 tablet (400 mg total) by mouth 2 (two) times daily. 60 tablet 11    metoprolol tartrate (LOPRESSOR) 25 MG tablet Take 1 tablet (25 mg total) by mouth 2 (two) times daily. 60 tablet 11    montelukast (SINGULAIR) 10 mg tablet Take 1 tablet (10 mg total) by mouth once daily. 30 tablet 11    morphine (MS CONTIN) 15 MG 12 hr tablet Take 15 mg by mouth 2 (two) times daily.       multivit,min52-folic-vitK-cQ10 (AQUADEKS) 100-700-10 mcg-mcg-mg Cap cap 1 tab twice daily 60 capsule 0    mycophenolate (CELLCEPT) 250 mg Cap Take 2 capsules (500 mg total) by mouth 2 (two) times daily. 120 capsule 11    omeprazole (PRILOSEC) 40 MG capsule Take 40 mg by mouth 2 (two) times daily.      ondansetron (ZOFRAN) 8 MG tablet Take 1 tablet (8 mg total) by mouth every 12 (twelve) hours as needed for Nausea. 30 tablet 2    oxycodone (ROXICODONE) 5 MG immediate release tablet Take 10 mg by mouth every 4 (four) hours as needed for Pain.      polyethylene glycol (GLYCOLAX) 17 gram PwPk Take 17 g by mouth 2 (two) times daily. 60 packet 11    predniSONE (DELTASONE) 5 MG tablet Take 1 tablet (5 mg total) by mouth once daily. 30 tablet 11    promethazine (PHENERGAN) 25 MG tablet Take 1 tablet (25 mg total) by mouth every 6 (six) hours as needed for Nausea. 60 tablet 2    tacrolimus (PROGRAF) 0.5 MG Cap Take 1 capsule (0.5 mg total) by mouth once daily. 30 capsule 11    tacrolimus (PROGRAF) 1 MG Cap Take 2 capsules (2 mg total) by mouth every 12 (twelve) hours. Daily doses: 2.5 mg BID 90 capsule 11    tiZANidine (ZANAFLEX) 4 MG tablet TAKE 2 TABLETS BY MOUTH EVERY 6 HOURS AS NEEDED 90 tablet 0    topiramate (TOPAMAX) 25 MG tablet Take 1 tablet (25 mg total) by mouth 2 (two) times daily. 60 tablet 11    venlafaxine (EFFEXOR-XR) 150 MG Cp24 Take 150 mg by mouth once daily.      venlafaxine (EFFEXOR-XR) 37.5 MG 24 hr capsule Take 37.5 mg by mouth once daily.       No current facility-administered  medications on file prior to visit.        Antibiotics:   Antibiotics (From admission, onward)    None          Microbiology x 7d:   Microbiology Results (last 7 days)     ** No results found for the last 168 hours. **            Assessment:         Plan:       Transplant Infectious Disease - Candidacy:   Based on available information, there are no identified significant barriers to transplantation from an infectious disease standpoint pending acceptable serologies.    -  Final determination of transplant candidacy will be made once evaluation is complete and reviewed by the Transplant Selection Committee.      Counseling:  I discussed with the patient the risk for increased susceptibility to infections following transplantation including increased risk for infection right after transplant and if rejection should occur.    Specific guidance has been provided to the patient regarding the patients occupation, hobbies and activities to avoid future infectious complications including but not limited to avoiding undercooked meats and seafood, proper hygiene, and contact with animals.  The patients has been counseled on the importance of vaccinations including but not limited to a yearly flu vaccine.    Immunizations:  Based on the patients immunization history and serologies, immunizations were ordered:  - Influenza vaccine  - Prevnar  - Pneumovax 2 months  - TDaP  - Hepatitis A 0, 6 months  - Hepatitis B 0, 1, 6 months  - Twinrix 0, 1, 6 months  - Shingrix 0, 2 months (sent to pharmacy)       The patient was encouraged to contact us about any problems that may develop after immunization and possible side effects were reviewed.       Kamryn Jose MD, MPH  Infectious Disease

## 2019-01-22 NOTE — DISCHARGE INSTRUCTIONS

## 2019-01-22 NOTE — DISCHARGE SUMMARY
Admit 01/22/2019  Discharge  01/22/2019  Discharge / Principle diagnosis  pulmonary hypertension.    Discharge attending Laura Rachel MD  Hospital course.  Came in for [facility]  Tolerated procedure well (see full results in cardiac procedure section).  Results discussed with patient / caregiver.   Discharge condition / disposition Good / home   discharge activity activity as tolerated    Discharge diet diet as tolerated / unchanged from admission  Discharge medication   No current facility-administered medications on file prior to encounter.      Current Outpatient Medications on File Prior to Encounter   Medication Sig    amLODIPine (NORVASC) 10 MG tablet Take 1 tablet (10 mg total) by mouth once daily.    aripiprazole (ABILIFY) 2 MG Tab Take 2 mg by mouth once daily.    calcium-vitamin D3 (OS-KATY 500 + D3) 500 mg(1,250mg) -200 unit per tablet Take 1 tablet by mouth 2 (two) times daily with meals.    dapsone 100 MG Tab Take 1 tablet (100 mg total) by mouth once daily.    diphenhydrAMINE (BENADRYL) 50 MG tablet Take 1 tablet (50 mg total) by mouth every 6 (six) hours as needed for Itching.    DULERA 100-5 mcg/actuation HFAA INHALE 1 PUFF BY MOUTH TWICE DAILY    fexofenadine (ALLEGRA) 180 MG tablet Take 180 mg by mouth once daily.    fluconazole (DIFLUCAN) 150 MG Tab TAKE 1 TABLET BY MOUTH EVERY WEEK    fluticasone (FLONASE) 50 mcg/actuation nasal spray INSERT 2 SPRAYS IN EACH NOSTRIL DAILY    folic acid (FOLVITE) 1 MG tablet Take 1 tablet (1,000 mcg total) by mouth once daily.    gabapentin (NEURONTIN) 300 MG capsule Take 1 capsule (300 mg total) by mouth 3 (three) times daily.    inhalation spacing device (PROCHAMBER) USE AS DIRECTED    levalbuterol (XOPENEX) 1.25 mg/3 mL nebulizer solution Take 3 mLs (1.25 mg total) by nebulization every 8 (eight) hours as needed for Wheezing or Shortness of Breath. Rescue    lipase-protease-amylase (CREON) 36,000-114,000- 180,000 unit CpDR Take 4 capsules by mouth 3  (three) times daily with meals. Take 3 with snacks prn. (Patient taking differently: Take 5 capsules by mouth 3 (three) times daily with meals. Take 3 with snacks prn.)    LORazepam (ATIVAN) 1 MG tablet Take 2 mg by mouth every 6 (six) hours as needed for Anxiety.     magnesium oxide (MAG-OX) 400 mg tablet Take 1 tablet (400 mg total) by mouth 2 (two) times daily.    metoprolol tartrate (LOPRESSOR) 25 MG tablet Take 1 tablet (25 mg total) by mouth 2 (two) times daily.    montelukast (SINGULAIR) 10 mg tablet Take 1 tablet (10 mg total) by mouth once daily.    morphine (MS CONTIN) 15 MG 12 hr tablet Take 15 mg by mouth 2 (two) times daily.     multivit,min52-folic-vitK-cQ10 (AQUADEKS) 100-700-10 mcg-mcg-mg Cap cap 1 tab twice daily    mycophenolate (CELLCEPT) 250 mg Cap Take 2 capsules (500 mg total) by mouth 2 (two) times daily.    ondansetron (ZOFRAN) 8 MG tablet Take 1 tablet (8 mg total) by mouth every 12 (twelve) hours as needed for Nausea.    oxycodone (ROXICODONE) 5 MG immediate release tablet Take 10 mg by mouth every 4 (four) hours as needed for Pain.    polyethylene glycol (GLYCOLAX) 17 gram PwPk Take 17 g by mouth 2 (two) times daily.    predniSONE (DELTASONE) 5 MG tablet Take 1 tablet (5 mg total) by mouth once daily.    promethazine (PHENERGAN) 25 MG tablet Take 1 tablet (25 mg total) by mouth every 6 (six) hours as needed for Nausea.    tacrolimus (PROGRAF) 0.5 MG Cap Take 1 capsule (0.5 mg total) by mouth once daily.    tacrolimus (PROGRAF) 1 MG Cap Take 2 capsules (2 mg total) by mouth every 12 (twelve) hours. Daily doses: 2.5 mg BID    topiramate (TOPAMAX) 25 MG tablet Take 1 tablet (25 mg total) by mouth 2 (two) times daily.     Followup in clinic as scheduled

## 2019-01-22 NOTE — PATIENT INSTRUCTIONS
To view vaccine record:   [email or web address]       Today:  - Hepatitis A #1  - TDaP (good for 10 years)  - Shingrix #1 (Shingles)     2 months:  - Shingrix #2 (Shingles)     6 months:  - Hepatitis A #2

## 2019-01-22 NOTE — PROGRESS NOTES
INFECT[facility]OUS DISEASE CL[facility]  01/22/2019 1:18 PM    Subjective:      Chief Complaint:   Chief Complaint   Patient presents with    Follow-up       History of Present Illness:    Patient Juanita Ibarra is a 29 y.o. female with cystic fibrosis who presents today for pre-lung transplant infectious disease evaluation. 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 Currently on iv cefepime and micafungin (started 1/14) for increased cough and purulent sputum production. Reports symptoms have improved. Last seen by ID 12/8/18 during hospital stay for positive respiratory cultures, suspicious for colonization.     Last CT chest 12/5/18:   Patient with history of cystic fibrosis status post bilateral sequential lung transplant demonstrating overall remarkable improvement in the previously seen nodular soft tissue opacities at the lung bases.  Appearance of the pulmonary parenchyma appears back to baseline as compared to December 2016.    New thickening of the posterior wall of the bronchus intermedius which may represent inflammation.     Respiratory culture 1/9/19:  Gram Stain (Respiratory) <10 epithelial cells per low power field.    Gram Stain (Respiratory) Few WBC's    Gram Stain (Respiratory) Many Gram negative rods    Gram Stain (Respiratory) Moderate Gram positive cocci                Pseudomonas aeruginosa       CULTURE, RESPIRATORY  - CYSTIC FIBROSIS       Amikacin <=16  Sensitive       Cefepime <=8  Sensitive       Ciprofloxacin <=1  Sensitive       Gentamicin <=4  Sensitive       Meropenem <=4  Sensitive       Piperacillin/Tazo <=16  Sensitive       Tobramycin <=4  Sensitive             Specimen Information: Bronchial Wash; Respiratory           Component 1mo ago   Fungus (Mycology) Culture BRANDI GLABRATA   Moderate       Fungus (Mycology) Culture BRANDI DUBLINIENSIS   Few                 1) Do you have a  history of:         YES NO   Diabetes   []        [x]     Autoimmune disease  []        [x]   Cancer              []        [x]   Surgical Removal of Spleen []        [x]       Cystic fibrosis     2) Have you had recurrent infections:             YES NO  Sinus infections  [x]        []   Lung infections  [x]        []              Urinary Tract Infections []        [x]                                              Intestinal Infections  []        [x]      Skin Infections   []        [x]       Musculoskeletal Infections    []        [x]   Reproductive Infections []        [x]   Periodontal Disease  []        [x]        3)Have you ever had: YES     NO       Chicken Pox   [x]         []          Shingles   []         [x]            Orolabial Herpes             []         [x]          Genital Herpes  []         [x]           Genital Warts   []         [x]             Cytomegalovirus  []         [x]          Dean-Barr Virus  []         [x]              Hepatitis A   []         [x]   H/o vaccine for international travel ([place], [place], [place])       Hepatitis B   []         [x]          Hepatitis C   []         [x]            Syphilis   []         [x]          Gonorrhea   []         [x]         Chlamydia    []         [x]           Parasites / worms  []         [x]         Fungal Infections  []         [x]         Bloodstream Infections []         [x]             4) Tuberculosis             YES NO  Exposure to person with active TB?  []         [x]   Have you ever had a positive PPD?      []         [x]   If yes, what treatment did you receive:     5) Travel    What states have you lived in for more than a month?     What countries have you visited for more than 2 weeks? 7 day cruise to [place]                                   YES     NO  Did you have any associated infections?   []         [x]     Are you planning to travel outside of [place] after transplant?    []          [x]     6) Animal Exposure                   YES NO  Do you have pets living in your house?   []         [x]   If yes, describe:     Do you spend time or live on a farm?    []         [x]   If yes, which ones:    Do you have a fish tank?         []  [x]    Do you have a litter box?     [x]         []  Cats, doesn't change litter box    Do you fish or hunt?      []         [x]   Do you clean or skin fish or animals?    []         [x]     Consume raw or undercooked meat, fish, shellfish?  []         [x]       7) What occupations have you had? no          8) Hobbies          What hobbies do you have? Crystal, no outdoor           YES     NO  Do you garden or otherwise work in the soil?   []         [x]   Do you hike, camp, or spend time in wooded areas?  []         [x]       9) The patient's immunization history was reviewed.      Have you ever received:  YES NO DATES  Routine Childhood vaccines  [x]         []       Influenza vaccine   [x]         [] 2018 vaccine in hospital.     Prevnar    [x]         [] 10/2/2017    Pneumovax    [x]         [] 1/22/2002    Tetanus-diptheria -pertussis  []         [] 12/16/2010, 8/10/2000    Hepatitis A vaccine series       []         [x]     Hepatitis B vaccine series         [x]         []     Meningitis vaccine   []         [x]     Zoster vaccine    []         [x]        Significant Labs Reviewed:    HepA Ab   HepBc Ab   HepBs Ag   HepBs Ab   HepC Ab   HIV   RPR   Quant gold  Strongy   Schistosomiasis    HIV: No components found for: HIV 1/2 AG/AB  Hepatitis C IgG: No components found for: HEPATITIS C  Syphilis:   RPR   Date Value Ref Range Status   01/09/2019 Non-reactive Non-reactive Final   10/15/2013 Non-reactive Non-reactive Final       Hepatitis A IgG: No components found for: HEPATITIS A IGG  Hepatitis Bc IgG: No components found for: HEPATITIS B CORE IGG  Hepatitis Bs IgG:  Quantiferon:   Quantiferon Gold TB   Date Value Ref Range Status   10/16/2013 Indeterminate (A) Negative Final     Comment:      Positive test result.   M. tuberculosis complex infection likely. M. kansasii,   M. marinum, and M. szulgai infection cannot be ruled out.     Toxoplasmosis: No results found for: TOXOPLASMA  VZV IgG: No components found for: VARICELLA IGG    No components found for: SEDIMENTATION RATE  No components found for: C-REACTIVE PROTEIN      Microbiology x 7d:   Microbiology Results (last 7 days)     ** No results found for the last 168 hours. **          Immunization History   Administered Date(s) Administered    Cytomegalovirus Immune Globulin 06/13/2014       Pending Labs:    Review of Symptoms:  Constitutional: Denies fevers, chills, or weakness.  ENT: Denies dysphagia, nasal discharge, ear pain or discharge.  Cardiovascular: Denies chest pain, palpitations, orthopnea, or claudication.  Respiratory: Denies shortness of breath, cough, hemoptysis, or wheezing.  GI: Denies nausea/vomitting, hematochezia, melena, abd pain, or changes in appetite.  : Denies dysuria, incontinence, or hematuria.  Musculoskeletal: Denies joint pain or myalgias.  Skin/breast: Denies rashes, lumps, lesions, or discharge.  Neurologic: Denies headache, dizziness, vertigo, or paresthesias.    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 OR 2ND [facility]    BRONCHOSCOPY Bilateral 12/11/2018     Performed by Francisca Morin DO at [facility] OR 2ND [facility]LR    BRONCHOSCOPY N/A 10/1/2018    Performed by Jake Alvarez MD at [facility] OR 2ND [facility]LR    BRONCHOSCOPY Bilateral 12/20/2016    Performed by Jake Alvarez MD at [facility] OR [facility]ND [facility]    BRONCHOSCOPY - flexible bronchoscopy with probable tissue biopsy CPT 77451 N/A 7/21/2015    Performed by Jake Alvarez MD at [facility] OR 2ND [facility]LR    BRONCHOSCOPY - flexible bronchoscopy with probable tissue biospy CPT 56941 N/A 10/20/2015    Performed by Jake Alvarez MD at [facility] OR 2ND [facility]LR    BRONCHOSCOPY - flexible bronchoscopy with tissue biopsy CPT 90016 N/A 9/29/2015    Performed by Jake Alvarez MD at [facility] OR 2ND FLR    BRONCHOSCOPY - flexible bronchoscopy with tissue biopsy CPT 89764 N/A 5/26/2015    Performed by Jake Alvarez MD at [facility] OR 1ST FLR    BRONCHOSCOPY flexible bronchoscopy with tissue biopsy N/A 9/8/2014    Performed by Jake Alvarez MD at [facility] OR 2ND FLR    BRONCHOSCOPY flexible with possible tissue biopsy N/A 8/8/2014    Performed by Jake Alvarez MD at [facility] OR 2ND FLR    BRONCHOSCOPY, BAL, BIOPSIES N/A 3/20/2018    Performed by Jake Alvarez MD at [facility] OR 2ND FLR    BRONCHOSCOPY-FIBEROPTIC N/A 1/29/2016    Performed by Joann Cifuentes DO at [facility] OR 2ND FLR    BRONCHOSCOPY; Bronchoscopy with BAL and transbronchial biopsies under general anesthesia N/A 5/29/2018    [facility]    CHOLECYSTECTOMY  2016    CHOLECYSTECTOMY-LAPAROSCOPIC N/A 7/22/2016    Performed by Joshua Goldberg, MD at [facility] OR 2ND FLR    ENDOMETRIAL ABLATION  2015    KJB    FESS      FESS Bilateral 10/21/2013    Performed by Jared Whatley MD at [facility] OR [address] [place] [address]    FINE NEEDLE ASPIRATION (FNA)  of a cervical lymphadenopathy  1/29/2016    Performed by Joann Cifuentes DO at [facility] OR 2ND FLR    flex bronchoscopy with probable tissue biopsy N/A 7/31/2014    [facility]    flexible bronchoscopy with tissue biopsy N/A 12/11/2014    Performed by Jake Alvarez MD at [facility] OR [address] [place] [address]    INJECTION-VOCAL CORD Left 6/24/2014    Performed by Jared Whatley MD at [facility] OR [address] [place] [address]    [id number]-M-CATH to right chest and removal of portacath to left chests wall. Bilateral 6/24/2014    Performed by Zhen Dorado MD at [facility] OR [address] [place] [address]    LARYNX SURGERY  2016    LUNG TRANSPLANT Bilateral 6/12/14    MYRINGOTOMY W/ TUBES Right 04/2017    MYRINGOTOMY WITH INSERTION OF PE TUBES Right 4/15/2017    Performed by Gary Null MD at [facility] OR [address] [place] [address]    PLACEMENT-TUBE-PEG  5/15/2014    Performed by David Moses MD at [facility] OR [address] [place] [address]    PORTACATH PLACEMENT Right     rt sc    REMOVAL-TUBE-PEG  5/15/2014    Performed by David Moses MD at [facility] OR [address] [place] [address]    ROBOTIC ASSISTED LAPAROSCOPIC HYSTERECTOMY N/A 4/25/2017    Performed by [facility] Jr[facility] at [facility] OR    SALPINGECTOMY Bilateral 2015    KJB    SALPINGECTOMY-LAPAROSCOPIC Bilateral 5/19/2015    Performed by [facility] Jr[facility] MD at [facility] OR    SINUS SURGERY FUNCTIONAL ENDOSCOPIC WITH NAVIGATION Bilateral 4/3/2017    Performed by Cesar Olsen MD at [facility] OR [address] [place] [address]    SINUS SURGERY FUNCTIONAL ENDOSCOPIC WITH NAVIGATION, 99517, 96305, 67702, 84933 Bilateral 2/5/2015    Performed by Cesar Olsen MD at [facility] OR [address] [place] [address]    THYROPLASTY - Medialization laryngoplasty, cricothyroid subluxation, arytenoid repositioning Left 8/2/2016    Performed by Gary Null MD at [facility] OR [address] [place] [address]    TRANSPLANT-LUNG Bilateral 6/11/2014    Performed by Adolfo Huston MD at [facility] OR [address] [place] [address]       Family History   Problem Relation Age of Onset    Thrombocytopenia Maternal Grandfather     Drug abuse Maternal Grandfather     Breast cancer Neg Hx     Colon cancer Neg Hx     Ovarian cancer Neg Hx        Social History     Socioeconomic History    Marital status: Single     Spouse name: Not on  file    Number of children: Not on file    Years of education: Not on file    Highest education level: Not on file   Social Needs    Financial resource strain: Not on file    Food insecurity - worry: Not on file    Food insecurity - inability: Not on file    Transportation needs - medical: Not on file    Transportation needs - non-medical: Not on file   Occupational History    Not on file   Tobacco Use    Smoking status: Never Smoker    Smokeless tobacco: Never Used   Substance and Sexual Activity    Alcohol use: No     Comment: Has not had an alcoholic drink in more than 2 months.    Drug use: No    Sexual activity: Yes     Partners: Male     Birth control/protection: Implant   Other Topics Concern    Not on file   Social History Narrative    Not on file       Review of patient's allergies indicates:   Allergen Reactions    Albuterol Palpitations    Colistin Anaphylaxis    Vancomycin analogues      Infusion reaction that does not resolve with slowing    Neupogen [filgrastim] Other (See Comments)     Ostealgia after five daily doses of 300 mcg.      Bactrim [sulfamethoxazole-trimethoprim] Hives    Ceftazidime Hives     Pt stated can tolerate cefapine not ceftazidime    Ceftazidime     Dronabinol Other (See Comments)     Mental changes/hallucinations    Haldol [haloperidol lactate] Other (See Comments)     Seizure like activity    Nsaids (non-steroidal anti-inflammatory drug)      Cannot have due to lung transplant    Adhesive Rash     Cloth tape- please use tegaderm or paper tape    Aztreonam Rash    Ciprofloxacin Nausea And Vomiting     Projectile N/V, per patient.  Unwilling to retry therapy.         Objective:   VS (24h):   Vitals:    01/22/19 1306   BP: 117/76   Pulse: 85   Temp: 98.2 °F (36.8 °C)     General: Afebrile, alert, comfortable, no acute distress.   HEENT: JANE. EOMI, no scleral icterus. No sinus tenderness. MMM.  Pulmonary: Non labored,clear to auscultation A/P/L. No  wheezing, crackles, or rhonchi.  Cardiac: normal S1 & S2 w/o rubs/murmurs/gallops. No JVD.   Abdominal: Non-tender, non-distended.Bowel sounds present x 4. No appreciable hepatosplenomegaly. No guarding or rebound tenderness  Extremities: Moves all extremities x 4. No peripheral edema. 2+ pulses.  Skin: No jaundice, rashes, or visible lesions.   Neurological:  Alert and oriented x 4.     Labs:  Glucose   Date Value Ref Range Status   01/22/2019 313 (H) 70 - 110 mg/dL Final   01/09/2019 97 70 - 110 mg/dL Final   01/09/2019 97 70 - 110 mg/dL Final     Calcium   Date Value Ref Range Status   01/22/2019 9.2 8.7 - 10.5 mg/dL Final   01/09/2019 8.8 8.7 - 10.5 mg/dL Final   01/09/2019 8.8 8.7 - 10.5 mg/dL Final     Albumin   Date Value Ref Range Status   01/22/2019 3.3 (L) 3.5 - 5.2 g/dL Final   01/09/2019 3.7 3.5 - 5.2 g/dL Final   01/09/2019 3.7 3.5 - 5.2 g/dL Final     Total Protein   Date Value Ref Range Status   01/22/2019 7.0 6.0 - 8.4 g/dL Final   01/09/2019 7.5 6.0 - 8.4 g/dL Final   01/09/2019 7.5 6.0 - 8.4 g/dL Final     Sodium   Date Value Ref Range Status   01/22/2019 136 136 - 145 mmol/L Final   01/09/2019 139 136 - 145 mmol/L Final   01/09/2019 139 136 - 145 mmol/L Final     Potassium   Date Value Ref Range Status   01/22/2019 4.5 3.5 - 5.1 mmol/L Final   01/09/2019 3.7 3.5 - 5.1 mmol/L Final   01/09/2019 3.7 3.5 - 5.1 mmol/L Final     CO2   Date Value Ref Range Status   01/22/2019 28 23 - 29 mmol/L Final   01/09/2019 23 23 - 29 mmol/L Final   01/09/2019 23 23 - 29 mmol/L Final     Chloride   Date Value Ref Range Status   01/22/2019 102 95 - 110 mmol/L Final   01/09/2019 107 95 - 110 mmol/L Final   01/09/2019 107 95 - 110 mmol/L Final     BUN, Bld   Date Value Ref Range Status   01/22/2019 18 6 - 20 mg/dL Final   01/09/2019 21 (H) 6 - 20 mg/dL Final   01/09/2019 21 (H) 6 - 20 mg/dL Final     Creatinine   Date Value Ref Range Status   01/22/2019 1.0 0.5 - 1.4 mg/dL Final   01/09/2019 0.9 0.5 - 1.4 mg/dL Final    01/09/2019 0.9 0.5 - 1.4 mg/dL Final     Alkaline Phosphatase   Date Value Ref Range Status   01/22/2019 190 (H) 55 - 135 U/L Final   01/09/2019 244 (H) 55 - 135 U/L Final   01/09/2019 244 (H) 55 - 135 U/L Final     ALT   Date Value Ref Range Status   01/22/2019 43 10 - 44 U/L Final   01/09/2019 83 (H) 10 - 44 U/L Final   01/09/2019 83 (H) 10 - 44 U/L Final     AST   Date Value Ref Range Status   01/22/2019 29 10 - 40 U/L Final   01/09/2019 43 (H) 10 - 40 U/L Final   01/09/2019 43 (H) 10 - 40 U/L Final     Total Bilirubin   Date Value Ref Range Status   01/22/2019 0.2 0.1 - 1.0 mg/dL Final     Comment:     For infants and newborns, interpretation of results should be based  on gestational age, weight and in agreement with clinical  observations.  Premature Infant recommended reference ranges:  Up to 24 hours.............<8.0 mg/dL  Up to 48 hours............<12.0 mg/dL  3-5 days..................<15.0 mg/dL  6-29 days.................<15.0 mg/dL     01/09/2019 0.3 0.1 - 1.0 mg/dL Final     Comment:     For infants and newborns, interpretation of results should be based  on gestational age, weight and in agreement with clinical  observations.  Premature Infant recommended reference ranges:  Up to 24 hours.............<8.0 mg/dL  Up to 48 hours............<12.0 mg/dL  3-5 days..................<15.0 mg/dL  6-29 days.................<15.0 mg/dL     01/09/2019 0.3 0.1 - 1.0 mg/dL Final     Comment:     For infants and newborns, interpretation of results should be based  on gestational age, weight and in agreement with clinical  observations.  Premature Infant recommended reference ranges:  Up to 24 hours.............<8.0 mg/dL  Up to 48 hours............<12.0 mg/dL  3-5 days..................<15.0 mg/dL  6-29 days.................<15.0 mg/dL       WBC   Date Value Ref Range Status   01/22/2019 5.96 3.90 - 12.70 K/uL Final   01/09/2019 8.98 3.90 - 12.70 K/uL Final   12/12/2018 3.93 3.90 - 12.70 K/uL Final      Hemoglobin   Date Value Ref Range Status   01/22/2019 9.4 (L) 12.0 - 16.0 g/dL Final   01/09/2019 9.9 (L) 12.0 - 16.0 g/dL Final   12/12/2018 7.5 (L) 12.0 - 16.0 g/dL Final     POC Hematocrit   Date Value Ref Range Status   11/07/2018 23 (L) 36 - 54 %PCV Final   11/07/2018 27 (L) 36 - 54 %PCV Final   06/12/2014 31 (L) 36 - 54 %PCV Final     Hematocrit   Date Value Ref Range Status   01/22/2019 32.3 (L) 37.0 - 48.5 % Final   01/09/2019 33.3 (L) 37.0 - 48.5 % Final   12/12/2018 25.7 (L) 37.0 - 48.5 % Final     MCV   Date Value Ref Range Status   01/22/2019 93 82 - 98 fL Final   01/09/2019 89 82 - 98 fL Final   12/12/2018 96 82 - 98 fL Final     Platelets   Date Value Ref Range Status   01/22/2019 123 (L) 150 - 350 K/uL Final   01/09/2019 131 (L) 150 - 350 K/uL Final   12/12/2018 101 (L) 150 - 350 K/uL Final     Lab Results   Component Value Date    CHOL 129 01/09/2019    CHOL 157 06/29/2017    CHOL 144 05/19/2016     Lab Results   Component Value Date    HDL 48 01/09/2019    HDL 57 06/29/2017    HDL 63 05/19/2016     Lab Results   Component Value Date    LDLCALC 63.8 01/09/2019    LDLCALC 83.2 06/29/2017    LDLCALC 62.0 (L) 05/19/2016     Lab Results   Component Value Date    TRIG 86 01/09/2019    TRIG 84 06/29/2017    TRIG 95 05/19/2016     Lab Results   Component Value Date    CHOLHDL 37.2 01/09/2019    CHOLHDL 36.3 06/29/2017    CHOLHDL 43.8 05/19/2016     RPR   Date Value Ref Range Status   01/09/2019 Non-reactive Non-reactive Final   10/15/2013 Non-reactive Non-reactive Final     Quantiferon Gold TB   Date Value Ref Range Status   10/16/2013 Indeterminate (A) Negative Final     Comment:     Positive test result.   M. tuberculosis complex infection likely. M. kansasii,   M. marinum, and M. szulgai infection cannot be ruled out.       Medications:  Current Outpatient Medications on File Prior to Visit   Medication Sig Dispense Refill    acetaminophen (TYLENOL) 500 MG tablet Take 1,000 mg by mouth every 6 (six)  hours as needed for Pain.      amLODIPine (NORVASC) 10 MG tablet Take 1 tablet (10 mg total) by mouth once daily. 30 tablet 11    aripiprazole (ABILIFY) 2 MG Tab Take 2 mg by mouth once daily.      butalbital-acetaminophen-caffeine -40 mg (FIORICET, ESGIC) -40 mg per tablet Take 1 tablet by mouth every 6 (six) hours as needed for Pain.      calcium-vitamin D3 (OS-KATY 500 + D3) 500 mg(1,250mg) -200 unit per tablet Take 1 tablet by mouth 2 (two) times daily with meals. 60 tablet 11    dapsone 100 MG Tab Take 1 tablet (100 mg total) by mouth once daily. 30 tablet 11    diphenhydrAMINE (BENADRYL) 50 MG capsule Take 50 mg by mouth.      diphenhydrAMINE (BENADRYL) 50 MG tablet Take 1 tablet (50 mg total) by mouth every 6 (six) hours as needed for Itching. 1 tablet 0    DULERA 100-5 mcg/actuation HFAA INHALE 1 PUFF BY MOUTH TWICE DAILY 13 g 0    fexofenadine (ALLEGRA) 180 MG tablet Take 180 mg by mouth once daily.      fluconazole (DIFLUCAN) 150 MG Tab TAKE 1 TABLET BY MOUTH EVERY WEEK 30 tablet 0    fluticasone (FLONASE) 50 mcg/actuation nasal spray INSERT 2 SPRAYS IN EACH NOSTRIL DAILY 16 g 5    folic acid (FOLVITE) 1 MG tablet Take 1 tablet (1,000 mcg total) by mouth once daily. 30 tablet 11    gabapentin (NEURONTIN) 300 MG capsule Take 1 capsule (300 mg total) by mouth 3 (three) times daily. 90 capsule 11    hydrOXYzine HCl (ATARAX) 10 MG Tab Take 25 mg by mouth 3 (three) times daily as needed.      inhalation spacing device (Genelux) USE AS DIRECTED 1 Device 0    levalbuterol (XOPENEX HFA) 45 mcg/actuation inhaler Inhale 1-2 puffs into the lungs.      levalbuterol (XOPENEX HFA) 45 mcg/actuation inhaler INHALE 2 PUFFS INTO LUNGS Q 4-6 H PRF SOB  10    levalbuterol (XOPENEX) 1.25 mg/3 mL nebulizer solution Take 3 mLs (1.25 mg total) by nebulization every 8 (eight) hours as needed for Wheezing or Shortness of Breath. Rescue 72 mL 5    lipase-protease-amylase (CREON) 36,000-114,000-  180,000 unit CpDR Take 4 capsules by mouth 3 (three) times daily with meals. Take 3 with snacks prn. (Patient taking differently: Take 5 capsules by mouth 3 (three) times daily with meals. Take 3 with snacks prn.) 800 capsule 11    LORazepam (ATIVAN) 1 MG tablet Take 2 mg by mouth every 6 (six) hours as needed for Anxiety.       LORazepam (ATIVAN) 2 MG Tab TK 1 T PO Q 8 H PRF ANXIETY  0    magnesium oxide (MAG-OX) 400 mg tablet Take 1 tablet (400 mg total) by mouth 2 (two) times daily. 60 tablet 11    metoprolol tartrate (LOPRESSOR) 25 MG tablet Take 1 tablet (25 mg total) by mouth 2 (two) times daily. 60 tablet 11    montelukast (SINGULAIR) 10 mg tablet Take 1 tablet (10 mg total) by mouth once daily. 30 tablet 11    morphine (MS CONTIN) 15 MG 12 hr tablet Take 15 mg by mouth 2 (two) times daily.       multivit,min52-folic-vitK-cQ10 (AQUADEKS) 100-700-10 mcg-mcg-mg Cap cap 1 tab twice daily 60 capsule 0    multivitamin-minerals no.55 (CENTRUM FLAVOR BURST ADULT) Chew       mycophenolate (CELLCEPT) 250 mg Cap Take 2 capsules (500 mg total) by mouth 2 (two) times daily. 120 capsule 11    omeprazole (PRILOSEC) 40 MG capsule Take 40 mg by mouth 2 (two) times daily.      ondansetron (ZOFRAN) 8 MG tablet Take 1 tablet (8 mg total) by mouth every 12 (twelve) hours as needed for Nausea. 30 tablet 2    oxycodone (ROXICODONE) 5 MG immediate release tablet Take 10 mg by mouth every 4 (four) hours as needed for Pain.      polyethylene glycol (GLYCOLAX) 17 gram PwPk Take 17 g by mouth 2 (two) times daily. 60 packet 11    predniSONE (DELTASONE) 5 MG tablet Take 1 tablet (5 mg total) by mouth once daily. 30 tablet 11    promethazine (PHENERGAN) 25 MG tablet Take 1 tablet (25 mg total) by mouth every 6 (six) hours as needed for Nausea. 60 tablet 2    tacrolimus (PROGRAF) 0.5 MG Cap Take 1 capsule (0.5 mg total) by mouth once daily. 30 capsule 11    tacrolimus (PROGRAF) 1 MG Cap Take 2 capsules (2 mg total) by  mouth every 12 (twelve) hours. Daily doses: 2.5 mg BID 90 capsule 11    tiZANidine (ZANAFLEX) 4 MG tablet TAKE 2 TABLETS BY MOUTH EVERY 6 HOURS AS NEEDED 90 tablet 0    topiramate (TOPAMAX) 25 MG tablet Take 1 tablet (25 mg total) by mouth 2 (two) times daily. 60 tablet 11    venlafaxine (EFFEXOR-XR) 150 MG Cp24 Take 150 mg by mouth once daily.      venlafaxine (EFFEXOR-XR) 37.5 MG 24 hr capsule Take 37.5 mg by mouth once daily.       No current facility-administered medications on file prior to visit.        Antibiotics:   Antibiotics (From admission, onward)    None          Microbiology x 7d:   Microbiology Results (last 7 days)     ** No results found for the last 168 hours. **            Assessment:         Plan:       Transplant Infectious Disease - Candidacy:   Based on available information, there are no identified significant barriers to transplantation from an infectious disease standpoint pending acceptable serologies.  Final determination of transplant candidacy will be made once evaluation is complete and reviewed by the Transplant Selection Committee.    Counseling:  I discussed with the patient the risk for increased susceptibility to infections following transplantation including increased risk for infection right after transplant and if rejection should occur.    Specific guidance has been provided to the patient regarding the patients occupation, hobbies and activities to avoid future infectious complications including but not limited to avoiding undercooked meats and seafood, proper hygiene, and contact with animals.  The patients has been counseled on the importance of vaccinations including but not limited to a yearly flu vaccine.    Immunizations:  Based on the patients immunization history and serologies, immunizations were ordered:  - TDaP  - Hepatitis A 0, 6 months  - Shingrix 0, 2 months       The patient was encouraged to contact us about any problems that may develop after  immunization and possible side effects were reviewed.       Kamryn Jose MD, MPH  Infectious Disease

## 2019-01-23 NOTE — LETTER
January 24, 2019      Jake Alvarez MD  [address]  [facility] [address]           [facility] Cardi[facility] S[facility]  [address]  [facility] [address]  Phone: [phone]          Patient: Juanita Ibarra   MR Number: [id number]   YOB: 1990   Date of Visit: 1/23/2019       Dear Dr. Jake Alvarez:    Thank you for referring Juanita Ibarra to me for evaluation. Attached you will find relevant portions of my assessment and plan of care.    If you have questions, please do not hesitate to call me. I look forward to following Juanita Ibarra along with you.    Sincerely,    David Gay MD    Enclosure  CC:  No Recipients    If you would like to receive this communication electronically, please contact [email or web address] or [phone] to request more information on [employer] access.    For providers and/or their staff who would like to refer a patient to [facility], please contact us through our one-stop-shop provider referral line, [facility] , at [phone].    If you feel you have received this communication in error or would no longer like to receive these types of communications, please e-mail [email or web address]

## 2019-01-23 NOTE — TELEPHONE ENCOUNTER
----- Message from Jake Alvarez MD sent at 1/22/2019  9:38 PM CST -----  Normal    Received the above response from Dr. Alvarez regarding the renal GFR results.

## 2019-01-23 NOTE — PROCEDURES
Nasal/sinus endoscopy  Date/Time: 1/23/2019 5:17 PM  Performed by: Cesar Olsen MD  Authorized by: Cesar Olsen MD     Consent Done?:  Yes (Verbal)  Anesthesia:     Local anesthetic:  Lidocaine 4% and Tushar-Synephrine 1/2%    Patient tolerance:  Patient tolerated the procedure well with no immediate complications  Nose:     Procedure Performed:  Nasal Endoscopy  External:      No external nasal deformity  Intranasal:      Mucosa polyps     Mucosa ulcers not present     No mucosa lesions present     Turbinates not enlarged     No septum gross deformity  Nasopharynx:      No mucosa lesions     Adenoids not present     Posterior choanae patent     Eustachian tube patent     Moderate mucopurulence without significant crusting.  Sinuses mostly patent on left.  Moderate polyps on right ethmoid and frontal recess with purulence.

## 2019-01-23 NOTE — PROGRESS NOTES
Subjective:       Patient ID: Juanita Ibarra is a 29 y.o. female.    Chief Complaint:  Well Woman      History of Present Illness  HPI  Juanita Ibarra is a 29 y.o. female  here for her annual GYN exam.   She is undergoing evaluation for another Lung Transplant.   denies vaginal itching or irritation.  Denies vaginal discharge.  She is not currently sexually active.    History of abnormal pap: No  Last Pap: approximate date 2017 and was normal  Last MMG: None    denies domestic violence. She does feel safe at home.     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     Past Surgical History:   Procedure Laterality Date    ABDOMINAL SURGERY      peg tube     RROYTSJY-[id number]-[id number] N/A 2015    [facility]    BIOPSY-BRONCHUS N/A 2016    Performed by [facility] at [facility] OR [facility]ND [facility]    BRONCHOSCOPY Bilateral 2018    Performed by Francisca Morin DO at [facility] OR [facility]ND [facility]    BRONCHOSCOPY N/A 10/1/2018    Performed by [facility] at [facility] OR [facility]    BRONCHOSCOPY Bilateral 2016    Performed by [facility] MD at [facility] OR [facility]    BRONCHOSCOPY - flexible bronchoscopy with probable tissue biopsy CPT 32144 N/A 2015    Performed by [facility] MD at [facility] OR [facility]    BRONCHOSCOPY - flexible bronchoscopy with probable tissue biospy CPT 11465 N/A 10/20/2015    Performed by [facility] MD at [facility] OR [facility]    BRONCHOSCOPY - flexible bronchoscopy with tissue biopsy CPT 53453 N/A 2015     Performed by [facility] MD at [facility] OR [facility]    BRONCHOSCOPY - flexible bronchoscopy with tissue biopsy CPT 69631 N/A 5/26/2015    Performed by Jake Alvarez MD at [facility] OR [facility]ST [facility]    BRONCHOSCOPY flexible bronchoscopy with tissue biopsy N/A 9/8/2014    Performed by Jake [facility]varez MD at [facility] OR [facility]    BRONCHOSCOPY flexible with possible tissue biopsy N/A 8/8/2014    Performed by Jake Alvarez MD at [facility] OR [address] [place] [address]    BRONCHOSCOPY, BAL, BIOPSIES N/A 3/20/2018    [facility]    BRONCHOSCOPY-FIBEROPTIC N/A 1/29/2016    Performed by Joann Cifuentes DO at [facility] OR [address] [place] [address]    BRONCHOSCOPY; Bronchoscopy with BAL and transbronchial biopsies under general anesthesia N/A 5/29/2018    Performed by Jake Alvarez MD at [facility] OR [address] [place] [address]    CHOLECYSTECTOMY  2016    CHOLECYSTECTOMY-LAPAROSCOPIC N/A 7/22/2016    Performed by Joshua Goldberg, MD at [facility] OR [address] [place] [address]    ENDOMETRIAL ABLATION  2015    [facility]    FESS      FESS Bilateral 10/21/2013    Performed by Jared Whatley MD at [facility] OR [address] [place] [address]    FINE NEEDLE ASPIRATION (FNA)  of a cervical lymphadenopathy  1/29/2016    Performed by Joann Cifuentes DO at [facility] OR [address] [place] [address]    flex bronchoscopy with probable tissue biopsy N/A 7/31/2014    Performed by [facility] Diagnostic Provider at [facility] OR [address] [place] [address]    flexible bronchoscopy with tissue biopsy N/A 12/11/2014    Performed by Jake Alvarez MD at [facility] OR [address] [place] [address]    HYSTERECTOMY  04/2017    [facility]    INJECTION-VOCAL CORD Left 6/24/2014    Performed by Jared Whatley MD at [facility] OR [address] [place] [address]    [id number]-J-CATH to right chest and removal of portacath to left chests wall. Bilateral 6/24/2014    Performed by Zhen Dorado MD at [facility] OR [address] [place] [address]    LARYNX SURGERY  2016    LUNG TRANSPLANT Bilateral 6/12/14    MYRINGOTOMY W/ TUBES Right 04/2017    MYRINGOTOMY WITH INSERTION OF PE TUBES Right 4/15/2017    Performed by Gary Null MD at [facility]  OR 2ND FLR    PLACEMENT-TUBE-PEG  5/15/2014    Performed by [facility] Moses MD at [facility] OR 2ND [facility]    PORTACATH PLACEMENT Right     rt sc    REMOVAL-TUBE-PEG  5/15/2014    Performed by David Moses MD at [facility] OR 2ND [facility]    RHC for lung re-transplant N/A 1/22/2019    Performed by Laura Rachel MD at [facility] CATH LAB    ROBOTIC ASSISTED LAPAROSCOPIC HYSTERECTOMY N/A 4/25/2017    Performed by [facility] Jr[facility] MD at [facility] OR    SALPINGECTOMY Bilateral 2015    [facility]    SALPINGECTOMY-LAPAROSCOPIC Bilateral 5/19/2015    Performed by [facility] Jr[facility] MD at [facility] OR    SINUS SURGERY FUNCTIONAL ENDOSCOPIC WITH NAVIGATION Bilateral 4/3/2017    Performed by Cesar Olsen MD at [facility] OR 2ND [facility]LR    SINUS SURGERY FUNCTIONAL ENDOSCOPIC WITH NAVIGATION, 38296, 26284, 66715, 38583 Bilateral 2/5/2015    Performed by Cesar Olsen MD at [facility] OR [address] [place] [address]    THYROPLASTY - Medialization laryngoplasty, cricothyroid subluxation, arytenoid repositioning Left 8/2/2016    Performed by Gary Null MD at [facility] OR 2ND [facility]    TRANSPLANT-LUNG Bilateral 6/11/2014    Performed by Adolfo Huston MD at [facility] OR [address] [place] [address]     Social History     Socioeconomic History    Marital status: Single     Spouse name: Not on file    Number of children: Not on file    Years of education: Not on file    Highest education level: Not on file   Social Needs    Financial resource strain: Not on file    Food insecurity - worry: Not on file    Food insecurity - inability: Not on file    Transportation needs - medical: Not on file    Transportation needs - non-medical: Not on file   Occupational History    Not on file   Tobacco Use    Smoking status: Never Smoker    Smokeless tobacco: Never Used   Substance and Sexual Activity    Alcohol use: No     Comment: Has not had an alcoholic drink in more than 2 months.    Drug use: No    Sexual activity: Yes     Partners: Male     Birth control/protection: See Surgical Hx      Comment: current partner since Oct 2016   Other Topics Concern    Not on file   Social History Narrative    Not on file     Family History   Problem Relation Age of Onset    Thrombocytopenia Maternal Grandfather     Drug abuse Maternal Grandfather     Diabetes Maternal Grandfather     Hypertension Maternal Grandmother     Breast cancer Neg Hx     Colon cancer Neg Hx     Ovarian cancer Neg Hx      OB History      Para Term  AB Living    0              SAB TAB Ectopic Multiple Live Births                       BP (!) 133/91   Ht 5' (1.524 m)   Wt 55.8 kg (123 lb)   LMP 10/02/2016   BMI 24.02 kg/m²         GYN & OB History    Date of Last Pap: 3/9/2017    OB History    Para Term  AB Living   0             SAB TAB Ectopic Multiple Live Births                         Review of Systems  Review of Systems   Constitutional: Positive for activity change and fatigue. Negative for appetite change and unexpected weight change.   HENT: Negative.    Eyes: Negative for visual disturbance.   Respiratory: Positive for cough and shortness of breath. Negative for wheezing.    Cardiovascular: Negative for chest pain, palpitations and leg swelling.   Gastrointestinal: Negative for abdominal pain, bloating and blood in stool.   Endocrine: Negative for diabetes and hair loss.   Genitourinary: Negative for decreased libido and dyspareunia.   Musculoskeletal: Negative for back pain and joint swelling.   Neurological: Negative for headaches.   Hematological: Does not bruise/bleed easily.   Psychiatric/Behavioral: Negative for depression and sleep disturbance. The patient is not nervous/anxious.    Breast: Negative for mastodynia and nipple discharge          Objective:      Physical Exam:   Constitutional: She is oriented to person, place, and time. She appears well-developed and well-nourished.    HENT:   Head: Normocephalic and atraumatic.    Eyes: EOM are normal. Pupils are equal, round, and  reactive to light.    Neck: Normal range of motion. Neck supple.    Cardiovascular: Normal rate and regular rhythm.     Pulmonary/Chest: Effort normal and breath sounds normal.   BREASTS:  no mass, no tenderness, no deformity and no retraction. Right breast exhibits no inverted nipple, no mass, no nipple discharge, no skin change, no tenderness, no bleeding and no swelling. Left breast exhibits no inverted nipple, no mass, no nipple discharge, no skin change, no tenderness, no bleeding and no swelling. Breasts are symmetrical.              Abdominal: Soft. Bowel sounds are normal.     Genitourinary: Pelvic exam was performed with patient supine.   Genitourinary Comments: PELVIC: Normal external female genitalia without lesions. Normal hair distribution. Adequate perineal body, normal urethral meatus. Vagina MOIST AND WELL RUGATED without lesions or discharge. No significant cystocele or rectocele. Bimanual exam shows uterus and cervix to be surgically absent. Adnexa without masses or tenderness.  RECTAL: Deferred             Musculoskeletal: Normal range of motion and moves all extremeties.       Neurological: She is alert and oriented to person, place, and time.    Skin: Skin is warm and dry.    Psychiatric: She has a normal mood and affect.              Assessment:        1. Encounter for gynecological examination without abnormal finding               Plan:      1. Encounter for gynecological examination without abnormal finding  COUNSELING:   The patient was also counseled today on ACS PAP guidelines, with recommendations for yearly pelvic exams unless their uterus, cervix, and ovaries were removed for benign reasons; in that case, examinations every 3-5 years are recommended. The patient was also counseled regarding monthly breast self-examination, routine STD screening for at-risk populations, prophylactic immunizations for transmitted infections such as HPV, Pertussis, or Influenza as appropriate, and yearly  mammograms when indicated by ACS guidelines. She was advised to see her primary care physician for all other health maintenance.   FOLLOW-UP with me for next routine visit.          SINCE SHE HAD A HYSTERECTOMY FOR BENIGN REASONS, NO FURTHER PAP SMEARS NEED TO BE DONE.  Follow-up in about 1 year (around 1/23/2020).

## 2019-01-23 NOTE — PROCEDURES
Juanita Ibarra is a 29 y.o.  female patient, who presents for a 6 minute walk test ordered by Antonio ARIZA.  The diagnosis is Pre Lung Transplant Evaluation.  The patient's BMI is 23.2 kg/m2.  Predicted distance (lower limit of normal) is 558.33 meters.      Test Results:    The test was completed without stopping.    The total time walked was 360 seconds.  During walking, the patient reported:  Lightheadedness, Dyspnea. The patient used   and supplemental oxygen during testing.     01/22/2019---------Distance: 304.8 meters (1000 feet)     O2 Sat % Supplemental Oxygen Heart Rate Blood Pressure Maris Scale   Pre-exercise  (Resting) 96 % 4 L/M 73 bpm 111/66 mmHg 2   During Exercise 96 % 4 L/M 100 bpm 136/78 mmHg 7-8   Post-exercise  (Recovery) 96 %    75 bpm   mmHg       Recovery Time: 116 seconds    Performing nurse/tech: Maria D MARLOW      PREVIOUS STUDY:   The patient had a previous study.  01/09/2019---------Distance: 182.88 meters (600 feet)       O2 Sat % Supplemental Oxygen Heart Rate Blood Pressure Maris Scale   Pre-exercise  (Resting) 99 % 3 L/M 97 bpm 119/79 mmHg 3   During Exercise 98 % 3 L/M 114 bpm 113/74 mmHg 7-8   Post-exercise  (Recovery) 99 % 3 L/M  99 bpm            CLINICAL INTERPRETATION:  Six minute walk distance is 304.8 meters (1000 feet) with very heavy dyspnea.  During exercise, there was no significant desaturation while breathing supplemental oxygen.  Blood pressure remained stable and Heart rate increased significantly with walking.  This may represent a tachycardic response to exercise.  The patient reported non-pulmonary symptoms during exercise.  Since the previous study in January 2019, exercise capacity is significantly improved.  Based upon age and body mass index, exercise capacity is less than predicted.

## 2019-01-23 NOTE — PROGRESS NOTES
TRANSPLANT NUTRITIONAL ASSESSMENT    Referring Provider: Jake Alvarez MD    Reason for Visit: Pre-lung transplant work-up    Age: 29 y.o.  Sex: female    Patient Active Problem List   Diagnosis    Cystic fibrosis with pulmonary manifestations    Sinusitis, chronic    Anxiety disorder    Other pain disorders related to psychological factors    Awaiting transplantation of lung    Protein calorie malnutrition    CF related Pancreatic insufficiency    Chronic pain with opiate use    Allergy to multiple antibiotics    Dysphagia, oropharyngeal    Chronic visceral pain (pleura)    Gallstones    Hyperglycemia    Adverse effect of glucocorticoid or synthetic analogue    Immunosuppression    Complication of lung transplant    Dysphonia    Constipation    Portacath in place    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    Prophylactic antibiotic    Complication of transplanted lung    Acute rejection of lung transplant    Debility    Muscle spasm of back    Anemia    Headache    Aftercare following organ transplant    Leukopenia    LRTI (lower respiratory tract infection)    SBO (small bowel obstruction)    Sterilization    Lung replaced by transplant    Fever    Cytomegalovirus (CMV) viremia    Hospital acquired PNA    Hyperkalemia, diminished renal excretion    Acute kidney injury    Other complications of lung transplant    S/P lung transplant    Pneumonia, organism unspecified(486)    Lymphadenopathy    Biliary colic    Unilateral complete vocal fold paralysis    Bronchiolitis obliterans syndrome    Chronic ethmoidal sinusitis    Mastoiditis    ETD (Eustachian tube dysfunction), bilateral    Dysmenorrhea    Sinus infection    Hypertension    Pneumonia    Current chronic use of systemic steroids    TMJ arthralgia    Adverse effect of anabolic steroid    Steroid-induced hyperglycemia    Opioid overdose    Acute kidney failure with lesion of tubular necrosis     SOB (shortness of breath)    Thrombocytopenia    Splenomegaly     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     Lab Results   Component Value Date     (H) 01/22/2019    K 4.5 01/22/2019    PHOS 3.3 11/12/2018    MG 1.9 01/09/2019    MG 1.9 01/09/2019    CHOL 129 01/09/2019    HDL 48 01/09/2019    TRIG 86 01/09/2019    ALBUMIN 3.3 (L) 01/22/2019    PREALBUMIN 15 (L) 05/29/2014    HGBA1C 4.7 01/09/2019    CALCIUM 9.2 01/22/2019     Other Pertinent Labs: none    Current Outpatient Medications   Medication Sig    acetaminophen (TYLENOL) 500 MG tablet Take 1,000 mg by mouth every 6 (six) hours as needed for Pain.    amLODIPine (NORVASC) 10 MG tablet Take 1 tablet (10 mg total) by mouth once daily.    aripiprazole (ABILIFY) 2 MG Tab Take 2 mg by mouth once daily.    butalbital-acetaminophen-caffeine -40 mg (FIORICET, ESGIC) -40 mg per tablet Take 1 tablet by mouth every 6 (six) hours as needed for Pain.    calcium-vitamin D3 (OS-KATY 500 + D3) 500 mg(1,250mg) -200 unit per tablet Take 1 tablet by mouth 2 (two) times daily with meals.    dapsone 100 MG Tab Take 1 tablet (100 mg total) by mouth once daily.    diphenhydrAMINE (BENADRYL) 50 MG capsule Take 50 mg by mouth.    diphenhydrAMINE (BENADRYL) 50 MG tablet Take 1 tablet (50 mg total) by mouth every 6 (six) hours as needed for Itching.    DULERA 100-5 mcg/actuation HFAA INHALE 1 PUFF BY MOUTH TWICE DAILY    fexofenadine (ALLEGRA) 180 MG tablet Take 180 mg by mouth once daily.    fluconazole (DIFLUCAN) 150 MG Tab TAKE 1 TABLET BY MOUTH EVERY WEEK     fluticasone (FLONASE) 50 mcg/actuation nasal spray INSERT 2 SPRAYS IN EACH NOSTRIL DAILY    folic acid (FOLVITE) 1 MG tablet Take 1 tablet (1,000 mcg total) by mouth once daily.    gabapentin (NEURONTIN) 300 MG capsule Take 1 capsule (300 mg total) by mouth 3 (three) times daily.    hydrOXYzine HCl (ATARAX) 10 MG Tab Take 25 mg by mouth 3 (three) times daily as needed.    inhalation spacing device (PROCHAMBER) USE AS DIRECTED    levalbuterol (XOPENEX HFA) 45 mcg/actuation inhaler Inhale 1-2 puffs into the lungs.    levalbuterol (XOPENEX HFA) 45 mcg/actuation inhaler INHALE 2 PUFFS INTO LUNGS Q 4-6 H PRF SOB    levalbuterol (XOPENEX) 1.25 mg/3 mL nebulizer solution Take 3 mLs (1.25 mg total) by nebulization every 8 (eight) hours as needed for Wheezing or Shortness of Breath. Rescue    lipase-protease-amylase (CREON) 36,000-114,000- 180,000 unit CpDR Take 4 capsules by mouth 3 (three) times daily with meals. Take 3 with snacks prn. (Patient taking differently: Take 5 capsules by mouth 3 (three) times daily with meals. Take 3 with snacks prn.)    LORazepam (ATIVAN) 1 MG tablet Take 2 mg by mouth every 6 (six) hours as needed for Anxiety.     LORazepam (ATIVAN) 2 MG Tab TK 1 T PO Q 8 H PRF ANXIETY    magnesium oxide (MAG-OX) 400 mg tablet Take 1 tablet (400 mg total) by mouth 2 (two) times daily.    metoprolol tartrate (LOPRESSOR) 25 MG tablet Take 1 tablet (25 mg total) by mouth 2 (two) times daily.    montelukast (SINGULAIR) 10 mg tablet Take 1 tablet (10 mg total) by mouth once daily.    morphine (MS CONTIN) 15 MG 12 hr tablet Take 15 mg by mouth 2 (two) times daily.     multivit,min52-folic-vitK-cQ10 (AQUADEKS) 100-700-10 mcg-mcg-mg Cap cap 1 tab twice daily    multivitamin-minerals no.55 (CENTRUM FLAVOR BURST ADULT) Chew     mycophenolate (CELLCEPT) 250 mg Cap Take 2 capsules (500 mg total) by mouth 2 (two) times daily.    omeprazole (PRILOSEC) 40 MG capsule Take 40 mg by mouth 2 (two) times  "daily.    ondansetron (ZOFRAN) 8 MG tablet Take 1 tablet (8 mg total) by mouth every 12 (twelve) hours as needed for Nausea.    oxycodone (ROXICODONE) 5 MG immediate release tablet Take 10 mg by mouth every 4 (four) hours as needed for Pain.    polyethylene glycol (GLYCOLAX) 17 gram PwPk Take 17 g by mouth 2 (two) times daily.    predniSONE (DELTASONE) 5 MG tablet Take 1 tablet (5 mg total) by mouth once daily.    promethazine (PHENERGAN) 25 MG tablet Take 1 tablet (25 mg total) by mouth every 6 (six) hours as needed for Nausea.    tacrolimus (PROGRAF) 0.5 MG Cap Take 1 capsule (0.5 mg total) by mouth once daily.    tacrolimus (PROGRAF) 1 MG Cap Take 2 capsules (2 mg total) by mouth every 12 (twelve) hours. Daily doses: 2.5 mg BID    tiZANidine (ZANAFLEX) 4 MG tablet TAKE 2 TABLETS BY MOUTH EVERY 6 HOURS AS NEEDED    topiramate (TOPAMAX) 25 MG tablet Take 1 tablet (25 mg total) by mouth 2 (two) times daily.    venlafaxine (EFFEXOR-XR) 150 MG Cp24 Take 150 mg by mouth once daily.    venlafaxine (EFFEXOR-XR) 37.5 MG 24 hr capsule Take 37.5 mg by mouth once daily.     No current facility-administered medications for this visit.      Allergies: Albuterol; Colistin; Vancomycin analogues; Neupogen [filgrastim]; Bactrim [sulfamethoxazole-trimethoprim]; Ceftazidime; Ceftazidime; Dronabinol; Haldol [haloperidol lactate]; Nsaids (non-steroidal anti-inflammatory drug); Adhesive; Aztreonam; and Ciprofloxacin    Ht Readings from Last 1 Encounters:   01/23/19 5' 0.1" (1.527 m)     Wt Readings from Last 1 Encounters:   01/23/19 56.5 kg (124 lb 9 oz)      BMI: Body mass index is 24.25 kg/m².    Usual Weight: 118-124 lb  Weight Change/Time: no significant change recently  Current Diet: regular  Appetite/Current Intake: good   Exercise/Physical Activity: functional in ADL's, walking around home, not much walking outside of home, requiring oxygen and debility  Nutritional/Herbal Supplements: occasional chocolate " Ensure  Potential Food/Medication Interactions: none  Chewing/Swallowing Problems: none  Symptoms: nausea  Assessment of Lab Values: Glu 313, Prealb 15, Alb 3.3  Support System: caregiver present and voices support    Estimated Kcal Need: 6351-9478 kcal (30-35 kcal/kg)  Estimated Protein Need: 56-67 gm (1-1.2 gm/kg)    Nutritional History:   Pt present with caregiver today. Pt reports appetite is good, some nausea intermittently, no vomiting. Pt states this nausea has been her norm for years. She has always eaten small portions earlier in the day and a larger evening meal and then snacks. Pt is not dx DM though BG noted to be elevated on yesterdays labs. Ha1c noted to be wnl. Pt reports she does not eat breakfast. Snacks earlier in the day could be greek yogurt, fresh fruit, sandwiches (turkey on wheat). Maybe chips or crackers. Pt has not felt up to cooking lately, caregiver works full time and will try to cook some days. They have been ordering take out from restaurants a lot. Pt may order steak, potatoes, brisket & potatoes, she likes chicken, pork, some fish. She likes chinese food vegetables and chicken, broccoli, green beans, carrots, corn, asparagus. Pt likes french fries from [employer] or fast food lately. If they cook at home they use fresh or frozen vegetables. Pt will make homemade smoothies sometimes using frozen berries, almond milk, gogi berry juice.    Pt is drinking Coke, Gatorade, some water, and whole milk usually.     Nutritional Diagnoses  Problem: food- and nutrition-related knowledge deficit and limited adherence to nutrition-related recommendations  Etiology: r/t prior edu on limiting sugary beverages and foods   Symptoms: aeb drinks at least 1 can of Coke per day, Gatorade, sweets, Glu 313 on labs    Educational Need? no  Barriers: none identified  Discussed with: patient and caregiver  Interventions: Patient taught nutrition information regarding Pre-lung transplant work-up.    Continue  staying mobile, walking daily, short intervals of time as much as possible.  Limit sugary beverages, sports drinks or soda. Avoid drinking sugary beverage instead of eating solid food.  Goals/Recommendations: diet adherence, choose healthy options when dining out and limit intake of concentrated sweets  Actions Taken: instruct/provide written information  Strategies Used: problem solving, goal setting, motivational interviewing  Patient and/or family comprehend instructions: yes , adherence expected  Outcome: Verbalizes understanding  Monitoring:     Contact information provided, will f/u in clinic and communicate with the care team as needed.     Counseling Time: 15 minutes

## 2019-01-23 NOTE — Clinical Note
Hi Matt-Her sinuses don't look terrible, but there are polyps and purulence, which could be MRSA.  Also ordered a scan.  I swabbed it and if MRSA maybe we could treat that and if the scan doesn't look too bad we can avoid sinus surgery.Ed

## 2019-01-23 NOTE — LETTER
January 23, 2019      Jake Alvarez MD  [address]  [place] [address]           [facility]  [address]  [place] [address]  Phone: [phone]  Fax: [phone]          Patient: Juanita Ibarra   MR Number: [id number]   YOB: 1990   Date of Visit: 1/23/2019       Dear Dr. Jake Alvarez:    Thank you for referring Juanita Ibarra to me for evaluation. Attached you will find relevant portions of my assessment and plan of care.    If you have questions, please do not hesitate to call me. I look forward to following Juanita Ibarra along with you.    Sincerely,    Cesar Olsen MD    Enclosure  CC:  No Recipients    If you would like to receive this communication electronically, please contact [email or web address] or [phone] to request more information on [email or web address] Link access.    For providers and/or their staff who would like to refer a patient to [facility], please contact us through our one-stop-shop provider referral line, [facility], at [phone].    If you feel you have received this communication in error or would no longer like to receive these types of communications, please e-mail [email or web address]

## 2019-01-23 NOTE — PROGRESS NOTES
Subjective:      Patient ID: Juanita Ibarra is a 29 y.o. female.    Chief Complaint: Consult      HPI:  Juanita Ibarra is a 29 y.o. female who presents with cystic fibrosis who underwent bilateral lung transplant in 2014 admitted for shortness of breath, cough, and chest congestion being treated with broad spectrum antibiotics. She developed elevated AST/ALT on hospital day 2 which is likely a non-specific elevation in the setting of infection. She has normal TB and INR which suggest her liver is working well. Imaging is not concerning for cirrhosis or liver disease. Isolated splenomegaly can be seen in cystic fibrosis patients. Pt here today for surgical evaluation for lung transplant.       Family and social history reviewed    Review of patient's allergies indicates:   Allergen Reactions    Albuterol Palpitations    Colistin Anaphylaxis    Vancomycin analogues      Infusion reaction that does not resolve with slowing    Neupogen [filgrastim] Other (See Comments)     Ostealgia after five daily doses of 300 mcg.      Bactrim [sulfamethoxazole-trimethoprim] Hives    Ceftazidime Hives     Pt stated can tolerate cefapine not ceftazidime    Ceftazidime     Dronabinol Other (See Comments)     Mental changes/hallucinations    Haldol [haloperidol lactate] Other (See Comments)     Seizure like activity    Nsaids (non-steroidal anti-inflammatory drug)      Cannot have due to lung transplant    Adhesive Rash     Cloth tape- please use tegaderm or paper tape    Aztreonam Rash    Ciprofloxacin Nausea And Vomiting     Projectile N/V, per patient.  Unwilling to retry therapy.     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    Cystic fibrosis of the lung     Ear infection     Hypertension     MRSA (methicillin resistant Staphylococcus aureus) carrier     Osteopenia     Other specified disease of  pancreas     Pain disorder     Seizures     Sinusitis, chronic     Vocal cord paralysis 06/2014    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     Past Surgical History:   Procedure Laterality Date    ABDOMINAL SURGERY      peg tube     [id number] N/A 5/19/2015    [facility]    BIOPSY-BRONCHUS N/A 12/20/2016    [facility] at [facility]    BRONCHOSCOPY Bilateral 12/11/2018    Performed by Francisca Morin DO at [facility]    BRONCHOSCOPY N/A 10/1/2018    Performed by [facility] at [facility]    BRONCHOSCOPY Bilateral 12/20/2016    [facility]formed [facility] at [facility]    BRONCHOSCOPY - flexible bronchoscopy with probable tissue biopsy CPT 78329 N/A 7/21/2015    Performed [facility] at [facility]    BRONCHOSCOPY - flexible bronchoscopy with probable tissue biospy CPT 22568 N/A 10/20/2015    Performed by [facility] at [facility]    BRONCHOSCOPY - flexible bronchoscopy with tissue biopsy CPT 20069 N/A 9/29/2015    Performed by [facility] at [facility]    BRONCHOSCOPY - flexible bronchoscopy with tissue biopsy CPT 21000 N/A 5/26/2015    Performed by [facility] MD at [facility] OR [facility]    BRONCHOSCOPY flexible bronchoscopy with tissue biopsy N/A 9/8/2014    Performed by [facility] at [facility]    BRONCHOSCOPY flexible with possible tissue biopsy N/A 8/8/2014    Performed by [facility] at [facility]    BRONCHOSCOPY, BAL, BIOPSIES N/A 3/20/2018    Performed by [facility] at [facility]    BRONCHOSCOPY-FIBEROPTIC N/A 1/29/2016    Performed by Joann Cifuentes DO at [facility] OR [facility]    BRONCHOSCOPY; Bronchoscopy with BAL and transbronchial biopsies under general anesthesia N/A 5/29/2018    Performed by Jake [facility]varez MD at [facility] OR [facility]    CHOLECYSTECTOMY  2016    CHOLECYSTECTOMY-LAPAROSCOPIC  N/A 7/22/2016    Performed by Joshua Goldberg, [facility] at [facility] OR [address] [place] [address]    ENDOMETRIAL ABLATION  2015    KJB    FESS      FESS Bilateral 10/21/2013    Performed by Jared Whatley MD at [facility] OR [address] [place] [address]    FINE NEEDLE ASPIRATION (FNA)  of a cervical lymphadenopathy  1/29/2016    Performed by Joann Cifuentes DO at [facility] OR [address] [place] [address]    flex bronchoscopy with probable tissue biopsy N/A 7/31/2014    Performed by [facility] OR [address] [place] [address]    flexible bronchoscopy with tissue biopsy N/A 12/11/2014    Performed by Jake Alvarez MD at [facility] OR [address] [place] [address]    INJECTION-VOCAL CORD Left 6/24/2014    Performed by Jared Whatley MD at [facility] OR [address] [place] [address]    [id number] to right chest and removal of portacath to left chests wall. Bilateral 6/24/2014    Performed by Zhen Dorado MD at [facility] OR [address] [place] [address]    LARYNX SURGERY  2016    LUNG TRANSPLANT Bilateral 6/12/14    MYRINGOTOMY W/ TUBES Right 04/2017    MYRINGOTOMY WITH INSERTION OF PE TUBES Right 4/15/2017    [facility]    PLACEMENT-TUBE-PEG  5/15/2014    [facility]    PORTACATH PLACEMENT Right     rt sc    REMOVAL-TUBE-PEG  5/15/2014    Performed by David Moses MD at [facility] OR [address] [place] [address]    RHC for lung re-transplant N/A 1/22/2019    Performed by Laura Rachel MD at [facility] CATH LAB    ROBOTIC ASSISTED LAPAROSCOPIC HYSTERECTOMY N/A 4/25/2017    [facility] OR    SALPINGECTOMY Bilateral 2015    KJB    SALPINGECTOMY-LAPAROSCOPIC Bilateral 5/19/2015    Per[facility] OR    SINUS SURGERY FUNCTIONAL ENDOSCOPIC WITH NAVIGATION Bilateral 4/3/2017    Performed by Cesar Olsen MD at [facility] OR [address] [place] [address]    SINUS SURGERY FUNCTIONAL ENDOSCOPIC WITH NAVIGATION, 24304, 25535, 73236, 62959 Bilateral 2/5/2015    Performed by Cesar Olsen MD at [facility] OR [address] [place] [address]    THYROPLASTY - Medialization  laryngoplasty, cricothyroid subluxation, arytenoid repositioning Left 8/2/2016    Performed by [facility] at [facility] OR [facility]    TRANSPLANT-LUNG Bilateral 6/11/2014    Performed by Adolfo Huston MD at [facility] OR [facility]     Family History     Problem Relation (Age of Onset)    Drug abuse Maternal Grandfather    Thrombocytopenia Maternal Grandfather        Social History     Socioeconomic History    Marital status: Single     Spouse name: Not on file    Number of children: Not on file    Years of education: Not on file    Highest education level: Not on file   Social Needs    Financial resource strain: Not on file    Food insecurity - worry: Not on file    Food insecurity - inability: Not on file    Transportation needs - medical: Not on file    Transportation needs - non-medical: Not on file   Occupational History    Not on file   Tobacco Use    Smoking status: Never Smoker    Smokeless tobacco: Never Used   Substance and Sexual Activity    Alcohol use: No     Comment: Has not had an alcoholic drink in more than 2 months.    Drug use: No    Sexual activity: Yes     Partners: Male     Birth control/protection: Implant   Other Topics Concern    Not on file   Social History Narrative    Not on file       Current medications Reviewed    Review of Systems   Constitutional: Negative for chills, fatigue, fever and unexpected weight change.   HENT: Negative for hearing loss and nosebleeds.    Eyes: Negative for pain, redness and visual disturbance.   Respiratory: Negative for cough, chest tightness and shortness of breath.    Cardiovascular: Negative for chest pain and leg swelling.   Gastrointestinal: Negative for abdominal distention.   Genitourinary: Negative for difficulty urinating and hematuria.   Musculoskeletal: Negative for back pain, gait problem and neck pain.   Neurological: Negative for dizziness, seizures, syncope and headaches.   Hematological: Negative for adenopathy. Does not  bruise/bleed easily.   Psychiatric/Behavioral: Negative for behavioral problems.     Objective:   Physical Exam   Constitutional: She is oriented to person, place, and time. She appears well-developed and well-nourished.   HENT:   Head: Normocephalic and atraumatic.   Eyes: Conjunctivae and EOM are normal. Pupils are equal, round, and reactive to light.   Neck: Normal range of motion. Neck supple. No JVD present. No tracheal deviation present. No thyromegaly present.   Cardiovascular: Normal rate, regular rhythm, normal heart sounds and intact distal pulses.   Pulmonary/Chest: Effort normal. She has decreased breath sounds.   Abdominal: Soft. Bowel sounds are normal.   Musculoskeletal: Normal range of motion.   Neurological: She is alert and oriented to person, place, and time. She has normal reflexes.   Skin: Skin is warm and dry.   Psychiatric: She has a normal mood and affect. Her behavior is normal. Judgment and thought content normal.   Nursing note and vitals reviewed.    Diagnostic Results:   CT reviewed  Assessment:   1. CF  Plan:   We discussed re transplant in detail

## 2019-01-23 NOTE — PROGRESS NOTES
Subjective:      Juanita is a 29 y.o. female who comes for follow-up of sinusitis.  Her last visit with me was on 6/29/2017.  Now nearly 2 years status-post endoscopic sinus surgery.  Currently living in [place].   Worsening dyspnea and pulmonary function, on portable oxygen, being evaluated for re-transplant.  Over past 2 months has thick green mucus from nose, hyposmia, nasal congestion.  Using saline daily, sometimes flonase but doesn't help.  Also left ear drainage for several weeks, mostly yellow, intermittent, no pain.  On cefipime currently.    SNOT-22 score = 51, NOSE score = 40%, ETDQ-7 score = 5.9    The patient's medications, allergies, past medical, surgical, social and family histories were reviewed and updated as appropriate.    A detailed review of systems was obtained with pertinent positives as per the above HPI, and otherwise negative.        Objective:     /74   Pulse 88   LMP 10/02/2016        Constitutional:   She appears well-developed. She is cooperative. Normal speech.  No hoarse voice.      Head:  Normocephalic. Salivary glands normal.  Facial strength is normal.      Ears:    Right Ear: No drainage or tenderness. Tympanic membrane is not perforated. Tympanic membrane mobility is normal. No middle ear effusion. A PE tube is seen. No decreased hearing is noted.   Left Ear: There is drainage. No tenderness. Tympanic membrane is not perforated. Tympanic membrane mobility is normal.  No middle ear effusion. A PE tube is seen. No decreased hearing is noted.   Mild crusting and yellow mucus on left adjacent to PE tube orifice.    Nose:  No mucosal edema, rhinorrhea, septal deviation or polyps. No epistaxis. Turbinates normal, no turbinate masses and no turbinate hypertrophy.  Right sinus exhibits no maxillary sinus tenderness and no frontal sinus tenderness. Left sinus exhibits no maxillary sinus tenderness and no frontal sinus tenderness.     Mouth/Throat  Oropharynx clear and moist  without lesions or asymmetry and normal uvula midline. She does not have dentures. Normal dentition. No oral lesions or mucous membrane lesions. No oropharyngeal exudate or posterior oropharyngeal erythema. Mirror exam not performed due to patient tolerance.  Mirror exam not performed due to patient tolerance.      Neck:  Neck normal without thyromegaly masses, asymmetry, normal tracheal structure, crepitus, and tenderness, thyroid normal, trachea normal and no adenopathy. Normal range of motion present.     She has no cervical adenopathy.     Cardiovascular:   Regular rhythm.      Pulmonary/Chest:   Effort normal.     Psychiatric:   She has a normal mood and affect. Her speech is normal and behavior is normal.     Neurological:   No cranial nerve deficit.     Skin:   No rash noted.       Procedure    Nasal endoscopy performed.  See procedure note.     Left nasal valve     Left ethmoid     Right nasal valve     Right ethmoid     Right ethmoid polyps with purulence, swabbed for culture        Data Reviewed    WBC (K/uL)   Date Value   01/22/2019 5.96     Eosinophil% (%)   Date Value   01/22/2019 6.2     Eos, Fluid (%)   Date Value   10/01/2018 13     Eos # (K/uL)   Date Value   01/22/2019 0.4     Platelets (K/uL)   Date Value   01/22/2019 123 (L)     Glucose (mg/dL)   Date Value   01/22/2019 313 (H)     No results found for: IGE    Pathology report indicated non-eosinophilic chronic inflammation.    Cultures showed MRSA.      Assessment:     1. Chronic pansinusitis    2. Nasal polyposis    3. Cystic fibrosis with pulmonary manifestations    4. 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 Recurrent acute serous otitis media of left ear    6. Acute suppurative otitis media of left ear         Plan:     Get CT sinuses.  Cultures taken, will see if needs coverage for sinus MRSA if present.  Rx ofloxacin drop for left ear.  May need revision sinus surgery if not improved with antibiotics.  Will re-evaluated upon next return to [facility]  [place] in early Feb.  Follow-up for test results.

## 2019-01-29 NOTE — TELEPHONE ENCOUNTER
----- Message from Gregory Howell sent at 1/29/2019  2:51 PM CST -----  Contact: Lizbeth/[facility]  Please call Lizbeth at [phone]    Need to verify the dispense amount of clindamycin (CLEOCIN) 300 MG capsule    Thank you

## 2019-01-29 NOTE — TELEPHONE ENCOUNTER
Returned call to Radha, pharmacist with [facility] who wanted to verify # of caps to dispense for clindamycin. Verified that she is to take 2 caps approximately 1 hour before her dental visit. Radha verbalized her understanding.

## 2019-01-29 NOTE — TELEPHONE ENCOUNTER
----- Message from Maria Dolores Roldan sent at 1/29/2019  1:25 PM CST -----  Contact: Pt  Needs Advice    Reason for call: Pt has questions regarding an antibiotic         Communication Preference: # [phone]    Additional Information:

## 2019-01-30 NOTE — TELEPHONE ENCOUNTER
Attempted to contact Gisel with the outcome of the selection committee meeting today.  No answer.  Left a message requesting a return call.

## 2019-01-30 NOTE — TELEPHONE ENCOUNTER
----- Message from Maria Dolores Roldan sent at 1/30/2019  8:44 AM CST -----  Contact: Pt  Patient Returning Call from [facility]    Who Left Message for Patient: Sylvia   Communication Preference: # [phone]  Additional Information:    Contacted Gisel and notified her of the outcome of the selection committee meeting yesterday.  Informed her that we need dental clearance and a letter from the physician prescribing her pain medications stating what she is taking and why.  Also informed her that Dr. Alvarez would like her to establish care with a pain management physician.  Informed her that Lacey, the , gave me a phone number for [facility] in [place] who accepts her insurance.  Gave Gisel the name and number of the pain management clinic and instructed her to call to establish care.  Informed her that she will receive a letter in the mail regarding our decision yesterday and she can go to other centers for possible evaluation if she does not agree with our decision.  She verbalized her understanding, wrote down the instructions, and read the instructions back.  Inquired about dental appointment.  She stated that she hasn't scheduled an appointment yet.  She stated that she may look for a dentist here since she will be moving here in case dental work is needed.  Instructed her to keep us updated on dental appointment and to contact us for any further questions or concerns.  She again verbalized her understanding.

## 2019-01-30 NOTE — COMMITTEE REVIEW
Native Organ Dx: Cystic Fibrosis    Deferred:  Juanita Ibarra was presented to selection committee for a diagnosis of bronchiolitis obliterans syndrome. All members present agreed that dental clearance and a letter regarding opiate use from the prescribing physician is required before a final determination regarding retransplantation can be made.  Also it was recommended that she get established with pain management.        I was present during the entire meeting and agree with the statement above.

## 2019-02-12 NOTE — PROGRESS NOTES
Outpatient Psychiatry Initial Visit (MD/NP)    2/12/2019    Juanita Ibarra, a 29 y.o. female, presenting for initial evaluation visit. Met with patient.    Reason for Encounter: Consult from lung transplant. Patient complains of No chief complaint on file.  .    History of Present Illness: .The patient is a 29 year old woman with cystic fibrosis.  She had bilateral lung transplant in 2014 and is being worked up for retransplant.  The main focus for the evaluation is her anxiety.  She states she has always been anxious.  At times, the anxiety is worse than at other times.  She staes her anxiety is high at present because of the uncertainty of the retransplant.  She is presently on Abilify 2 mg daily, Ativan 2 mg daily PRN, and Effexor .5 mg daily.  She last saw a psychiatrist 10 years ago.  There are no  Signs of psychosis.  She was alert and seemed fully oriented.    Review Of Systems:     Pertinent items are noted in HPI.    Current Evaluation:     Nutritional Screening: Considering the patient's height and weight, medications, medical history and preferences, should a referral be made to the dietitian? no    Constitutional  Vitals:  Most recent vital signs, dated less than 90 days prior to this appointment, were reviewed.    Vitals:    02/12/19 1016   BP: 117/76   Pulse: 95   Weight: 56.8 kg (125 lb 3.5 oz)   Height: 5' (1.524 m)        General:  mild respiratory distress in wheelchair     Musculoskeletal  Muscle Strength/Tone:  no tremor   Gait & Station:  in wheelchair     Psychiatric  Speech:  no latency; no press   Mood & Affect:  anxious  congruent and appropriate   Thought Process:  normal and logical   Associations:  intact   Thought Content:  normal, no suicidality, no homicidality, delusions, or paranoia   Insight:  intact   Judgement: behavior is adequate to circumstances   Orientation:  grossly intact   Memory: intact for content of interview   Language: grossly intact   Attention Span &  Concentration:  able to focus   Fund of Knowledge:  intact and appropriate to age and level of education       Relevant Elements of Neurological Exam: uses a wheelchair    Functioning in Relationships:  Spouse/partner:   Peers: has friends  Employers: not asked    Laboratory Data  Office Visit on 01/23/2019   Component Date Value Ref Range Status    Aerobic Bacterial Culture 01/23/2019    Final                    Value:PSEUDOMONAS AERUGINOSA  Few  No other significant isolate      Anaerobic Culture 01/23/2019 No anaerobes isolated   Final    Fungus (Mycology) Culture 01/23/2019 Culture in progress   Preliminary   Lab Visit on 01/22/2019   Component Date Value Ref Range Status    aPTT 01/22/2019 26.0  21.0 - 32.0 sec Final    HPRA Interpretation 01/22/2019 This HPRA test has been reflexed to a [employer] PRA Specificity.   Final    Group & Rh 01/22/2019 B POS   Final    WBC 01/22/2019 5.96  3.90 - 12.70 K/uL Final    RBC 01/22/2019 3.47* 4.00 - 5.40 M/uL Final    Hemoglobin 01/22/2019 9.4* 12.0 - 16.0 g/dL Final    Hematocrit 01/22/2019 32.3* 37.0 - 48.5 % Final    MCV 01/22/2019 93  82 - 98 fL Final    MCH 01/22/2019 27.1  27.0 - 31.0 pg Final    MCHC 01/22/2019 29.1* 32.0 - 36.0 g/dL Final    RDW 01/22/2019 14.6* 11.5 - 14.5 % Final    Platelets 01/22/2019 123* 150 - 350 K/uL Final    MPV 01/22/2019 11.4  9.2 - 12.9 fL Final    Immature Granulocytes 01/22/2019 0.2  0.0 - 0.5 % Final    Gran # (ANC) 01/22/2019 3.0  1.8 - 7.7 K/uL Final    Immature Grans (Abs) 01/22/2019 0.01  0.00 - 0.04 K/uL Final    Lymph # 01/22/2019 2.2  1.0 - 4.8 K/uL Final    Mono # 01/22/2019 0.4  0.3 - 1.0 K/uL Final    Eos # 01/22/2019 0.4  0.0 - 0.5 K/uL Final    Baso # 01/22/2019 0.04  0.00 - 0.20 K/uL Final    nRBC 01/22/2019 0  0 /100 WBC Final    Gran% 01/22/2019 49.5  38.0 - 73.0 % Final    Lymph% 01/22/2019 36.2  18.0 - 48.0 % Final    Mono% 01/22/2019 7.2  4.0 - 15.0 % Final    Eosinophil% 01/22/2019  6.2  0.0 - 8.0 % Final    Basophil% 01/22/2019 0.7  0.0 - 1.9 % Final    Differential Method 01/22/2019 Automated   Final    Sodium 01/22/2019 136  136 - 145 mmol/L Final    Potassium 01/22/2019 4.5  3.5 - 5.1 mmol/L Final    Chloride 01/22/2019 102  95 - 110 mmol/L Final    CO2 01/22/2019 28  23 - 29 mmol/L Final    Glucose 01/22/2019 313* 70 - 110 mg/dL Final    BUN, Bld 01/22/2019 18  6 - 20 mg/dL Final    Creatinine 01/22/2019 1.0  0.5 - 1.4 mg/dL Final    Calcium 01/22/2019 9.2  8.7 - 10.5 mg/dL Final    Total Protein 01/22/2019 7.0  6.0 - 8.4 g/dL Final    Albumin 01/22/2019 3.3* 3.5 - 5.2 g/dL Final    Total Bilirubin 01/22/2019 0.2  0.1 - 1.0 mg/dL Final    Alkaline Phosphatase 01/22/2019 190* 55 - 135 U/L Final    AST 01/22/2019 29  10 - 40 U/L Final    ALT 01/22/2019 43  10 - 44 U/L Final    Anion Gap 01/22/2019 6* 8 - 16 mmol/L Final    eGFR if African American 01/22/2019 >60.0  >60 mL/min/1.73 m^2 Final    eGFR if non African American 01/22/2019 >60.0  >60 mL/min/1.73 m^2 Final    Prothrombin Time 01/22/2019 10.6  9.0 - 12.5 sec Final    INR 01/22/2019 1.0  0.8 - 1.2 Final    SPLUA Testing Date 01/22/2019 01/23/2019 02:45 PM   Final    cPRA % 01/22/2019 0   Final    SPCL1 Testing Date 01/22/2019 01/23/2019 12:00 AM   Final    Serum Collection DT - [facility] Clas* 01/22/2019 01/22/2019 10:38 AM   Final    Class I Antibodies - [facility] 01/22/2019 Negative   Final    cPRA % 01/22/2019 0   Final    SPCL2 Testing Date 01/22/2019 01/23/2019 12:00 AM   Final    Serum Collection DT - [facility] Clas* 01/22/2019 01/22/2019 10:38 AM   Final    Class II Antibodies - [facility] 01/22/2019 Negative   Final    cPRA % 01/22/2019 0   Final         Medications  Outpatient Encounter Medications as of 2/12/2019   Medication Sig Dispense Refill    acetaminophen (TYLENOL) 500 MG tablet Take 1,000 mg by mouth every 6 (six) hours as needed for Pain.      amLODIPine (NORVASC) 10 MG tablet Take 1 tablet  (10 mg total) by mouth once daily. 30 tablet 11    butalbital-acetaminophen-caffeine -40 mg (FIORICET, ESGIC) -40 mg per tablet Take 1 tablet by mouth every 6 (six) hours as needed for Pain.      calcium-vitamin D3 (OS-KATY 500 + D3) 500 mg(1,250mg) -200 unit per tablet Take 1 tablet by mouth 2 (two) times daily with meals. 60 tablet 11    clindamycin (CLEOCIN) 300 MG capsule Take 2 capsules (600 mg total) by mouth every 6 (six) hours. 2 capsule 2    dapsone 100 MG Tab Take 1 tablet (100 mg total) by mouth once daily. 30 tablet 11    diphenhydrAMINE (BENADRYL) 50 MG capsule Take 50 mg by mouth.      diphenhydrAMINE (BENADRYL) 50 MG tablet Take 1 tablet (50 mg total) by mouth every 6 (six) hours as needed for Itching. 1 tablet 0    DULERA 100-5 mcg/actuation HFAA INHALE 1 PUFF BY MOUTH TWICE DAILY 13 g 0    fexofenadine (ALLEGRA) 180 MG tablet Take 180 mg by mouth once daily.      fluconazole (DIFLUCAN) 150 MG Tab TAKE 1 TABLET BY MOUTH EVERY WEEK 30 tablet 0    fluticasone (FLONASE) 50 mcg/actuation nasal spray INSERT 2 SPRAYS IN EACH NOSTRIL DAILY 16 g 5    folic acid (FOLVITE) 1 MG tablet Take 1 tablet (1,000 mcg total) by mouth once daily. 30 tablet 11    gabapentin (NEURONTIN) 300 MG capsule Take 1 capsule (300 mg total) by mouth 3 (three) times daily. 90 capsule 11    hydrOXYzine HCl (ATARAX) 10 MG Tab Take 25 mg by mouth 3 (three) times daily as needed.      inhalation spacing device (PROCeleni) USE AS DIRECTED 1 Device 0    levalbuterol (XOPENEX HFA) 45 mcg/actuation inhaler Inhale 1-2 puffs into the lungs.      levalbuterol (XOPENEX) 1.25 mg/3 mL nebulizer solution Take 3 mLs (1.25 mg total) by nebulization every 8 (eight) hours as needed for Wheezing or Shortness of Breath. Rescue 72 mL 5    lipase-protease-amylase (CREON) 36,000-114,000- 180,000 unit CpDR Take 4 capsules by mouth 3 (three) times daily with meals. Take 3 with snacks prn. (Patient taking differently: Take 5  capsules by mouth 3 (three) times daily with meals. Take 3 with snacks prn.) 800 capsule 11    LORazepam (ATIVAN) 1 MG tablet Take 2 mg by mouth every 6 (six) hours as needed for Anxiety.       LORazepam (ATIVAN) 2 MG Tab Take 1 tablet by mouth every 8 hours as needed for ANXIETY 30 tablet 1    magnesium oxide (MAG-OX) 400 mg (241.3 mg magnesium) tablet Take 1 tablet (400 mg total) by mouth 2 (two) times daily. 60 tablet 11    metoprolol tartrate (LOPRESSOR) 25 MG tablet Take 1 tablet (25 mg total) by mouth 2 (two) times daily. 60 tablet 11    montelukast (SINGULAIR) 10 mg tablet Take 1 tablet (10 mg total) by mouth once daily. 30 tablet 11    morphine (MS CONTIN) 15 MG 12 hr tablet Take 15 mg by mouth 2 (two) times daily.       multivit,min52-folic-vitK-cQ10 (AQUADEKS) 100-700-10 mcg-mcg-mg Cap cap 1 tab twice daily 60 capsule 0    multivitamin-minerals no.55 (CENTRUM FLAVOR BURST ADULT) Chew       mycophenolate (CELLCEPT) 250 mg Cap Take 2 capsules (500 mg total) by mouth 2 (two) times daily. 120 capsule 11    omeprazole (PRILOSEC) 40 MG capsule Take 40 mg by mouth 2 (two) times daily.      ondansetron (ZOFRAN) 8 MG tablet Take 1 tablet (8 mg total) by mouth every 12 (twelve) hours as needed for Nausea. 30 tablet 2    oxycodone (ROXICODONE) 5 MG immediate release tablet Take 10 mg by mouth every 4 (four) hours as needed for Pain.      polyethylene glycol (GLYCOLAX) 17 gram PwPk Take 17 g by mouth 2 (two) times daily. 60 packet 11    predniSONE (DELTASONE) 5 MG tablet Take 1 tablet (5 mg total) by mouth once daily. 30 tablet 11    promethazine (PHENERGAN) 25 MG tablet Take 1 tablet (25 mg total) by mouth every 6 (six) hours as needed for Nausea. 60 tablet 2    QUEtiapine (SEROQUEL) 25 MG Tab Take 1 tablet (25 mg total) by mouth 2 (two) times daily. 60 tablet 1    tacrolimus (PROGRAF) 0.5 MG Cap Take 1 capsule (0.5 mg total) by mouth once daily. 30 capsule 11    tacrolimus (PROGRAF) 1 MG Cap Take 2  capsules (2 mg total) by mouth every 12 (twelve) hours. Daily doses: 2.5 mg BID 90 capsule 11    tiZANidine (ZANAFLEX) 4 MG tablet TAKE 2 TABLETS BY MOUTH EVERY 6 HOURS AS NEEDED 90 tablet 0    topiramate (TOPAMAX) 25 MG tablet Take 1 tablet (25 mg total) by mouth 2 (two) times daily. 60 tablet 11    venlafaxine (EFFEXOR-XR) 150 MG Cp24 Take 1 capsule (150 mg total) by mouth once daily. 30 capsule 1    venlafaxine (EFFEXOR-XR) 37.5 MG 24 hr capsule Take 1 capsule (37.5 mg total) by mouth once daily. 30 capsule 1    [DISCONTINUED] aripiprazole (ABILIFY) 2 MG Tab Take 2 mg by mouth once daily.      [DISCONTINUED] LORazepam (ATIVAN) 2 MG Tab TK 1 T PO Q 8 H PRF ANXIETY  0    [DISCONTINUED] venlafaxine (EFFEXOR-XR) 150 MG Cp24 Take 150 mg by mouth once daily.      [DISCONTINUED] venlafaxine (EFFEXOR-XR) 37.5 MG 24 hr capsule Take 37.5 mg by mouth once daily.       No facility-administered encounter medications on file as of 2/12/2019.            Assessment - Diagnosis - Goals:     Impression: Anxiety not well controlled      ICD-10-CM ICD-9-CM   1. Generalized anxiety disorder F41.1 300.02       Strengths and Liabilities: Strength: Patient accepts guidance/feedback, Strength: Patient is expressive/articulate., Strength: Patient is intelligent., Strength: Patient is motivated for change., Strength: Patient has positive support network., Strength: Patient has reasonable judgment., Strength: Patient is stable.    Treatment Goals:  Specify outcomes written in observable, behavioral terms:   better control of anxiety    Treatment Plan/Recommendations:   · Medication Management: Continue current medications. The risks and benefits of medication were discussed with the patient. except stop Abilify and start Seroquel 25 mg BID      Return to Clinic: 1 month    Counseling time: 20  Total time: 60  Consulting clinician was informed of the encounter and consult note.

## 2019-02-14 NOTE — TELEPHONE ENCOUNTER
LOKI rcvd message from coordinator that pt contacted team stating she was unable to get portable O2 tanks from [facility]. LOKI contacted pt via home phone and reminded pt that her insurance informed us in October 2018 that it does not cover portable tanks and that cost would be $325 for two [id number] e tanks.    Pt verbalized understanding and states has been utilizing e tanks from a friend that works for an O2 comp nay and having them filled through [facility] for $15. Pt states that she is moving and now it will be more difficult to access this however understands the restriction if she does not have any. LOKI unable to offer any assistance for tanks and informed pt that this is one of the associated costs for transplanted that pt may have post txp.  Pt again verbalized understanding and thanked LOKI. Pt states will continue to utilize the resource from her friend and voiced no other needs at this time. LOKI remains available.

## 2019-02-14 NOTE — TELEPHONE ENCOUNTER
Returned call to patient who states that she is having trouble getting her oxygen supply from [facility].  Patient states she needs the oxygen for this weekend as she is moving and relocating to [place].  Informed patient that I will have Lacey Gomez  call her and Sienna to take care of her oxygen needs.  Patient verbalized understanding.

## 2019-02-14 NOTE — TELEPHONE ENCOUNTER
----- Message from Gregory Howell sent at 2/14/2019 11:30 AM CST -----  Contact: patient   Please call pt at [phone]    Having problems getting her oxygen supply approved     Thank you

## 2019-02-26 NOTE — TELEPHONE ENCOUNTER
----- Message from Radha Dias sent at 2/26/2019 12:27 PM CST -----  Contact: [email or web address]  Type: Needs Medical Advice    Who Called:  Patient   Symptoms (please be specific):  Congestion sore throat   How long has patient had these symptoms:  Since yesterday  Pharmacy name and phone #:   Best Call Back Number: [phone]  Additional Information:

## 2019-02-26 NOTE — PROGRESS NOTES
Patient called c/o nasal congestion with cough and sore throat.  Received torb from Dr. Morin instructing patient to come to clinic for a nasal swab.  Nasal sample collected without difficulty.  Informed patient that we will contact her once results become available.  Asked patient to contact us if symptoms worsen or does not resolve.  Patient verbalized understanding.

## 2019-02-26 NOTE — TELEPHONE ENCOUNTER
"Received a call from Gisel.  She stated that she has the note from the dentist and from her PCP regarding her pain medications.  She stated that she will bring the forms to clinic.  She stated that she was cleared by the dentist from an infection standpoint, but she does need to have a previous root canal re-done.  She inquired if the root canal would need to be completed before listing.  Informed her that last week Dr. Alvarez stated in the meeting that he wanted all dental work complete before even presenting patients to the selection committee.  Informed her that she should schedule it as soon as possible.  Informed her that if I receive the notes today before 3pm, we can discuss her case in selection today.  She stated that she just moved to [place] last weekend.  She needs to locate a dentist here who can do the root canal.  Inquired if she ever scheduled an appointment with the pain management clinic.  She stated that she hasn't because she has been so busy with the move and the dental appointments.  She stated that she would call today to schedule an appointment.  She also stated that her physician from [place] agreed to refill her medications if she is unable to get an appointment within the next month.  Instructed Gisel to keep us updated.  She verbalized her understanding.    Informed per Aletha that Gisel asked that I give her a call.    Contacted Gisel.  She stated that she called the pain management clinic.  She listened to the recording which requested that patient provide office notes and radiology records for the last 2 years.  Inquired if she left a message.  She stated, "No, I did not because I didn't know what to say.  I don't have any radiology records."  Informed her that I would contact the pain management office to see what is needed for a referral.  She verbalized her understanding.  "

## 2019-02-26 NOTE — TELEPHONE ENCOUNTER
Contacted Gisel and notified her of the outcome of the selection committee meeting today.  Informed her that she needs to see pain management before she will be approved for listing.  Also informed her that retreating the root canal is not mandatory for listing from our standpoint since she does not have an active infection present or any issue with the tooth.  However, I did inform her that I did not know if the insurance would require it for listing or not.  Informed her that I would contact pain management tomorrow to see about the referral and would call her back with the outcome.  She verbalized her understanding of all discussed.

## 2019-02-26 NOTE — COMMITTEE REVIEW
"Native Organ Dx: Cystic Fibrosis    Deferred:  Juanita Ibarra was discussed again in the Lung Transplant Selection Committee Meeting today.  Informed all the members present that the dental clearance and letter from her PCP regarding her opiate use was received.  Informed the members that the dentist recommended to "retreat root canal #30 with permanent crown.  No signs of infection or failure of root canal, but radiographically appears insufficient to ensure no long term risk of failure".  Instructed per Dr. Alvarez that the retreat of the root canal does not have to be done prior to listing.  However, it was decided that patient does need to schedule an appointment with pain management here in [place] since she recently relocated to see if opiate use could be decreased prior to listing.  If patient is listed, an anesthesia consult will need to be ordered upon admission for possible block due to her current dose of opiates.        I was present during the entire meeting and agree with the statement above.  "

## 2019-02-26 NOTE — TELEPHONE ENCOUNTER
Asked patient to come to clinic for a nasal swab due to complaints of nasal congestion and sore throat.

## 2019-02-27 NOTE — TELEPHONE ENCOUNTER
Contacted Gisel and informed her that I contacted [facility].  Informed her that she needs to sign a release of information for us to send the referral and records.  She verbalized her understanding and stated that she would come tomorrow to sign the release of information form.

## 2019-02-27 NOTE — TELEPHONE ENCOUNTER
Contacted [facility] ([phone]) for a referral for patient.  Spoke with Clary.  Informed Clary that patient recently relocated to the [facility] and she is being evaluated for re-transplantation.  Informed her that it was decided that patient needed to establish care with pain management prior to listing for re-transplantation.  Informed per Clary that they need a referral, last two office notes, operative note from transplant, and imaging reports from the last 2-3 years in order to schedule an appointment.  Instructed to fax information to [phone].  Inquired as to how soon appointment would be able to be scheduled once the information is received.  Clary stated that she would work to get the patient in as soon as possible.    External referral to Pain Management entered per Dr. Alvarez's instructions in selection committee meeting yesterday.

## 2019-02-28 NOTE — PROGRESS NOTES
Release of information signed per patient.  Referral, YASMEEN, and requested records faxed to Fabiana at [facility].

## 2019-03-01 NOTE — TELEPHONE ENCOUNTER
"Received written orders from Dr. Morin to arrange for the patient to take prednisone 20 mg daily x 5 days then resume maintenance dose of 5 mg daily - treatment for s/s coronavirus. Patient stated that she does still feel bad related to the virus and is also concerned that this will escalate to "an exacerbation". Instructed patient to try the prednisone increase which will hopefully make her feel better. Instructed to call back if her s/s worsen, even if after hours. Patient verbalized her understanding.  "

## 2019-03-04 NOTE — TELEPHONE ENCOUNTER
Received written orders from Dr. Morin following VORB to arrange for the patient to receive IV cefepime 2 GM every 8 hours x 14 days, weekly CBC&CMP while receiving IV antibiotic. Patient contacted with plan and instructions - will have lab work at [facility] outpatient lab on 3/11/19 & 3/18/19 - verbalized her understanding and satisfaction with this plan. Stated that she uses [facility] for her IV antibiotics and supplies - informed patient that there could be a delay in her receiving the medication/supplies due to the local holiday tomorrow ([place]) - patient stated that she is familiar with communicating with [facility] as needed - will access/deaccess port a cath and self infuse.   Orders faxed to Derrick at [phone], message also left for Quan Girard [phone] with [facility] that an order would be sent today. Updated patient's address - has moved to an area local to [facility] - new demographic information faxed with antibiotic orders.

## 2019-03-04 NOTE — TELEPHONE ENCOUNTER
"Returned call to the patient who stated she is on day #4 of 5 - prednisone bump to 20 mg daily. Stated that she feels like "the virus has cleared" but continues to c/o chest congestion. Stated she coughs intermittently and forces cough to clear sputum which she described as being very thick, dark yellow "almost greenish" and "a lot of it". Concerned and stated "I think I need to be on antibiotics". Informed patient that Dr. Morin would be notified of her s/s and she would be re-contacted with a plan. Verbalized her understanding.  "

## 2019-03-04 NOTE — TELEPHONE ENCOUNTER
----- Message from Saud Segura sent at 3/4/2019 12:26 PM CST -----  Contact: Patient  Needs Advice    Reason for call: Pt states she is not feeling well. She is having really bad chest congestion         Communication Preference: [phone]    Additional Information: N/A

## 2019-03-04 NOTE — TELEPHONE ENCOUNTER
Returned call to Clover with [facility] who wanted to verify that the patient was not in the hospital and where to send the medication and supplies. When called back, stated she did see after placing this call that the patient changed her residence and understands that the medication and supplies are to be sent to her [place] address.

## 2019-03-04 NOTE — TELEPHONE ENCOUNTER
----- Message from Saud Segura sent at 3/4/2019  4:20 PM CST -----  Contact: Clover Calderon  Needs Advice    Reason for call: Calling to find out when the pt will be discharged and would like to know if    medication and supplies have to be delivered to the hospital or the pt's home     Communication Preference: [phone]    Additional Information: N/A

## 2019-03-14 NOTE — TELEPHONE ENCOUNTER
----- Message from Sanaz Raya sent at 3/14/2019  2:23 PM CDT -----  Contact: Patient  Needs Advice    Reason for call: Patient wishes to f/u with Sylvia about being referred to specialist. Patient did not say what kind, but stated Sylvia has been working on it for her.         Communication Preference: [phone]    Additional Information: n/a    Contacted Gisel.  She inquired if I have heard back from pain management.  Informed her that the referral and requested medical records were faxed once the release of information was received.  Informed her that I have not received any phone call or fax notifications.  Gisel stated that she has not received a call either to schedule an initial evaluation appointment.  She stated that she has an appointment in [place] on Wednesday.  She stated that she will be out of her medications that day.  Informed Gisel that it is unlikely that an appointment with pain management will be scheduled by then.  Instructed her to keep the appointment in [place].  Informed her that I would call and check on the status of the referral.  She verbalized her understanding of all discussed.    Contacted [facility].  Transferred to the new patient coordinator.  No answer.  Left a message inquiring about the status of the referral.  Requested a return call.    Contacted Gisel.  Informed her that I had to leave a message.  Informed her that I would call her back once I receive a call back from [facility].  She verbalized her understanding.

## 2019-03-25 NOTE — TELEPHONE ENCOUNTER
Never received a return call from Clary with [facility].  Contacted Gisel to see if she ever received a call regarding an appointment.  She stated that she has not.  Informed her that I would call back to check on the status.  She verbalized her understanding.  Gisel also inquired about pulmonary rehab.  Informed her that I would discuss with Lacey.  Informed her that Medicaid doesn't cover pulmonary rehab, so I will need to find out if any of the programs accept Medicaid patients.  She again verbalized her understanding.    Spoke with Lacey regarding above.  Lacey stated that [facility] does allow patients to do the maintenance program for $50 / month.  Patients are not monitored with the maintenance program.  She also stated that  also allows patients to attend the maintenance program for $50 / month once the patient pays the $450 assessment fee.      Attempted to contact Clary (new patient coordinator) with [facility].  No answer.  Left a message requesting a return call regarding the status of the referral.    Contacted Gisel and informed her of above.  Informed her that I had to leave another message with [facility].  Also informed her of my conversation with Lacey.  She stated that she can afford the $50 / month, but she can't afford the $450 assessment fee.  Informed her that I would discuss with Lacey and send orders if necessary.  Also informed Gisel that a 6 minute walk test was added to her upcoming appointment in April to assess her functional status.  She verbalized her understanding of all discussed.

## 2019-03-27 NOTE — TELEPHONE ENCOUNTER
3/26/19 @ 10:25 - Received a voice message from Clary with [facility] stating that patient is scheduled for an appointment this Thursday, 3/29/19.  Phone number [phone].    3/27/19 @ 11:38 - Attempted to contact patient to confirm that she has an appointment scheduled tomorrow.  No answer.  Left a message inquiring about the appointment.

## 2019-03-27 NOTE — PROGRESS NOTES
Outpatient Psychiatry Follow-Up Visit (MD/NP)    3/27/2019    Clinical Status of Patient:  Outpatient (Ambulatory)    Chief Complaint:  Juanita Ibarra is a 29 y.o. female who presents today for follow-up of depression and anxiety.  Met with patient.      Interval History and Content of Current Session:  Interim Events/Subjective Report/Content of Current Session: Her anxiety is better controlled with Seroquel.  She still has some trouble getting to sleep.  Will increase Seroquel at bedtime.  She takes Lorazepam as needed about 1 a day for stressful situations.  We reviewed with her the potential interplay between opiates and benzos.  She has been on both for years and never takes more than prescribed.   Mood is good.  Will continue Effexor.    Psychotherapy:  · Target symptoms: depression, anxiety   · Why chosen therapy is appropriate versus another modality: relevant to diagnosis  · Outcome monitoring methods: self-report, observation  · Therapeutic intervention type: supportive psychotherapy  · Topics discussed/themes: building skills sets for symptom management, symptom recognition  · The patient's response to the intervention is accepting. The patient's progress toward treatment goals is good.   · Duration of intervention: 10 minutes.    Review of Systems   · PSYCHIATRIC: Pertinant items are noted in the narrative.    Past Medical, Family and Social History: The patient's past medical, family and social history have been reviewed and updated as appropriate within the electronic medical record - see encounter notes.    Compliance: yes    Side effects: None    Risk Parameters:  Patient reports no suicidal ideation  Patient reports no homicidal ideation  Patient reports no self-injurious behavior  Patient reports no violent behavior    Exam (detailed: at least 9 elements; comprehensive: all 15 elements)   Constitutional  Vitals:  Most recent vital signs, dated less than 90 days prior to this appointment,  were reviewed.   Vitals:    03/27/19 1016   BP: 138/86   Pulse: 76   Weight: 54.4 kg (120 lb)        General:  unremarkable, age appropriate     Musculoskeletal  Muscle Strength/Tone:  no tremor   Gait & Station:  in wheelchair     Psychiatric  Speech:  no latency; no press   Mood & Affect:  euthymic  congruent and appropriate   Thought Process:  normal and logical   Associations:  intact   Thought Content:  normal, no suicidality, no homicidality, delusions, or paranoia   Insight:  intact   Judgement: behavior is adequate to circumstances   Orientation:  grossly intact   Memory: intact for content of interview   Language: grossly intact   Attention Span & Concentration:  able to focus   Fund of Knowledge:  intact and appropriate to age and level of education     Assessment and Diagnosis   Status/Progress: Based on the examination today, the patient's problem(s) is/are well controlled.  New problems have not been presented today.   Co-morbidities are complicating management of the primary condition.  There are no active rule-out diagnoses for this patient at this time.     General Impression: Anxiety better controlled      ICD-10-CM ICD-9-CM   1. Generalized anxiety disorder F41.1 300.02       Intervention/Counseling/Treatment Plan   · Medication Management: Continue current medications. The risks and benefits of medication were discussed with the patient. except increase Seroquel 25 mg to 1 in AM and 2 at bedtime.      Return to Clinic: 1 month

## 2019-04-04 NOTE — TELEPHONE ENCOUNTER
Received a call from Gisel.  She stated that she has had chest congestion for the last few days.  She has a productive cough of thick, dark yellow mucus.  She has been having sinus issues.  When she irrigates her sinuses, she has green drainage.  She has increasing dyspnea with exertion and occasionally at rest.  She has fatigue and chest / rib pain.  She denies fever, hemoptysis, or any other complaints.  Informed Gisel that I would discuss with FELICIA Ramsey, and Dr. Mae and would call back with instructions.  She recently completed a 2 week course of IV Cefepime.  She initially felt better after the Cefepime, but symptoms started several days ago.  Inquired about oxygen saturations.  She stated that last night without her oxygen, her oxygen saturation was 85% on room air after taking a few steps.  She has been wearing oxygen at 3 liters.  Her oxygen saturation currently on 3 liters is 95%.  Inquired if she went to the pain management appointment.  She stated that she did.  Informed her that we will request the note from the visit.  She verbalized her understanding.    Message sent to FELICIA Ramsey, and Dr. Mae.    Received instructions to collect a respiratory culture and perform a nasal swab for viral respiratory panel tomorrow. Also instructed to schedule a clinic visit on Monday.    Contacted Gisel and notified her of the instructions for respiratory culture and nasal swab tomorrow.  She stated that her  has a meeting from 10 - 10:30, so she can come tomorrow around 11 am.  Also informed her that we will reschedule her appointment from 4/17/19 to Monday, 4/8/19.  Instructed her to go to the ER or call back for any worsening symptoms.  She verbalized her understanding of all discussed.

## 2019-04-05 PROBLEM — J22 LOWER RESPIRATORY TRACT INFECTION: Status: ACTIVE | Noted: 2019-01-01

## 2019-04-05 NOTE — ED PROVIDER NOTES
Encounter Date: 4/5/2019    SCRIBE #1 NOTE: I, Saúl Alves, am scribing for, and in the presence of,  Julio C Foster DO. I have scribed the following portions of the note - the EKG reading. Other sections scribed: HPI, ROS, PE.       History     Chief Complaint   Patient presents with    Shortness of Breath     sob and cough and chest congestion x 4 dyas.   , hx cystic fibrosis , on transplant list  already had one transplant - fever 100.3 in triage.Referred to ER to R/O pneumonia.      29 year old female with history of hypertension, cystic fibrosis on transplant list (already had one bilateral lung transplant in 2014), bronchiolitis obliterans syndrome, and asthma presents to the ED with complaints of shortness of breath, and associated cough and congestion. The patient states that she came in today in order to rule out pneumonia. Over the past week she has become progressively more congested and generally ill. The patient states that this morning when she woke up she had a coughing fit and it was very painful, which was new for her. Her cough is not productive. She is on Prograf 2.5 mg BID and was recorded to have a fever of 100.3 in the ED. At home, she is normally on 2 or 3 L of oxygen, but has recently had to increase to 4 L due to her worsening SOB. Patient states that she hasn't noticed any viral symptoms such as sore throat, and she has had the flu shot this year. She denies diarrhea, abdominal pain, and any urinary symptoms but does endorse nausea and wheezing.    The history is provided by the patient.     Review of patient's allergies indicates:   Allergen Reactions    Albuterol Palpitations    Colistin Anaphylaxis    Vancomycin analogues      Infusion reaction that does not resolve with slowing    Neupogen [filgrastim] Other (See Comments)     Ostealgia after five daily doses of 300 mcg.      Bactrim [sulfamethoxazole-trimethoprim] Hives    Ceftazidime Hives     Pt stated can tolerate cefapine  not ceftazidime    Ceftazidime     Dronabinol Other (See Comments)     Mental changes/hallucinations    Haldol [haloperidol lactate] Other (See Comments)     Seizure like activity    Nsaids (non-steroidal anti-inflammatory drug)      Cannot have due to lung transplant    Adhesive Rash     Cloth tape- please use tegaderm or paper tape    Aztreonam Rash    Ciprofloxacin Nausea And Vomiting     Projectile N/V, per patient.  Unwilling to retry therapy.     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     Past Surgical History:   Procedure Laterality Date    ABDOMINAL SURGERY      peg tube     UNLENHKB-QFXWNABSFBK-ORCTCZPALRLR N/A 5/19/2015    Performed by [facility]y Dias Jr[facility] at [facility] OR    BIOPSY-BRONCHUS N/A 12/20/2016    Performed by Jake [facility]varez MD at [facility] OR 2ND [facility]    BRONCHOSCOPY Bilateral 12/11/2018    Performed by Francisca Morin DO at [facility] OR 2ND [facility]    BRONCHOSCOPY N/A 10/1/2018    Performed by Jake Alvarez MD at [facility] OR 2ND [facility]    BRONCHOSCOPY Bilateral 12/20/2016    Performed by Jake [facility]varez MD at [facility] OR [facility] F[facility]    BRONCHOSCOPY - flexible bronchoscopy with probable tissue biopsy CPT 81198 N/A 7/21/2015    Performed by Jake Alvarez MD at [facility] OR [facility]LR    BRONCHOSCOPY - flexible bronchoscopy with probable tissue biospy CPT 89626 N/A 10/20/2015    Performed by Jake Alvarez MD at [facility] OR 2ND [facility]    BRONCHOSCOPY - flexible bronchoscopy with tissue biopsy CPT 78859 N/A 9/29/2015    Performed by Jake Alvarez MD at [facility] OR [facility]LR    BRONCHOSCOPY -  flexible bronchoscopy with tissue biopsy CPT 71312 N/A 5/26/2015    Performed by Jake [facility]varez MD at [facility] OR 1ST [facility]LR    BRONCHOSCOPY flexible bronchoscopy with tissue biopsy N/A 9/8/2014    Performed by Jake Alvarez MD at [facility] OR 2ND [facility]    BRONCHOSCOPY flexible with possible tissue biopsy N/A 8/8/2014    Performed by Jake Alvarez MD at [facility] OR 2ND [facility]    BRONCHOSCOPY, BAL, BIOPSIES N/A 3/20/2018    Performed by Jake Alvarez MD at [facility] OR 2ND [facility]LR    BRONCHOSCOPY-FIBEROPTIC N/A 1/29/2016    Performed by Joann Cifuentes DO at [facility] OR 2ND [facility]LR    BRONCHOSCOPY; Bronchoscopy with BAL and transbronchial biopsies under general anesthesia N/A 5/29/2018    Performed by Jake Alvarez MD at [facility] OR 2ND [facility]LR    CHOLECYSTECTOMY  2016    CHOLECYSTECTOMY-LAPAROSCOPIC N/A 7/22/2016    Performed by Joshua Goldberg, MD at [facility] OR 2ND [facility]LR    ENDOMETRIAL ABLATION  2015    [facility]    FESS      FESS Bilateral 10/21/2013    Performed by Jared Whatley MD at [facility] OR [address] [place] [address]    FINE NEEDLE ASPIRATION (FNA)  of a cervical lymphadenopathy  1/29/2016    Performed by Joann Cifuentes DO at [facility] OR 2ND [facility]    flex bronchoscopy with probable tissue biopsy N/A 7/31/2014    Performed by [facility] at [facility] OR 2ND [facility]    flexible bronchoscopy with tissue biopsy N/A 12/11/2014    [facility]    HYSTERECTOMY  04/2017    [facility]    INJECTION-VOCAL CORD Left 6/24/2014    Performed by Jared Whatley MD at [facility] OR [address] [place] [address]    RSZVSOZOX-SZKL-P-CATH to right chest and removal of portacath to left chests wall. Bilateral 6/24/2014    Performed by Zhen Dorado MD at [facility] OR [address] [place] [address]    LARYNX SURGERY  2016    LUNG TRANSPLANT Bilateral 6/12/14    MYRINGOTOMY W/ TUBES Right 04/2017    MYRINGOTOMY WITH INSERTION OF PE TUBES Right 4/15/2017    Performed by Gary Null MD at [facility] OR [address] [place] [address]    PLACEMENT-TUBE-PEG  5/15/2014    Performed by David BEJARANO  MD Aurelia at [facility] OR 2ND [facility]LR    PORTACATH PLACEMENT Right     rt sc    REMOVAL-TUBE-PEG  5/15/2014    Performed by David Moses MD at [facility] OR 2ND [facility]    RHC for lung re-transplant N/A 1/22/2019    Performed by Laura Rachel MD at [facility] CATH LAB    ROBOTIC ASSISTED LAPAROSCOPIC HYSTERECTOMY N/A 4/25/2017    Performed by [facility] Jr[facility] at [facility] OR    SALPINGECTOMY Bilateral 2015    [facility]    SALPINGECTOMY-LAPAROSCOPIC Bilateral 5/19/2015    Performed by Deny Dias Jr., MD at [facility] OR    SINUS SURGERY FUNCTIONAL ENDOSCOPIC WITH NAVIGATION Bilateral 4/3/2017    Performed by Cesar Olsen MD at [facility] OR [address] [place] [address]    SINUS SURGERY FUNCTIONAL ENDOSCOPIC WITH NAVIGATION, 52079, 02396, 27730, 63653 Bilateral 2/5/2015    [facility]    THYROPLASTY - Medialization laryngoplasty, cricothyroid subluxation, arytenoid repositioning Left 8/2/2016    Performed by Gary Null MD at [facility] OR 2ND [facility]    TRANSPLANT-LUNG Bilateral 6/11/2014    Performed by Adolfo Huston MD at [facility] OR [address] [place] [address]     Family History   Problem Relation Age of Onset    Thrombocytopenia Maternal Grandfather     Drug abuse Maternal Grandfather     Diabetes Maternal Grandfather     Hypertension Maternal Grandmother     Breast cancer Neg Hx     Colon cancer Neg Hx     Ovarian cancer Neg Hx      Social History     Tobacco Use    Smoking status: Never Smoker    Smokeless tobacco: Never Used   Substance Use Topics    Alcohol use: No     Comment: Has not had an alcoholic drink in more than 2 months.    Drug use: No     Review of Systems   Constitutional: Positive for chills and fever.   HENT: Positive for congestion. Negative for rhinorrhea and sore throat.    Respiratory: Positive for cough, shortness of breath and wheezing.         (+) Chest wall pain due to cough   Cardiovascular: Negative for chest pain and leg swelling.   Gastrointestinal: Positive for nausea. Negative for  abdominal pain, diarrhea and vomiting.   Genitourinary: Negative for dysuria, frequency and urgency.   Musculoskeletal: Negative for arthralgias, myalgias and neck pain.   Skin: Negative for rash and wound.   Allergic/Immunologic: Positive for immunocompromised state (Prograf 2.5 mg BID).   Neurological: Negative for syncope and headaches.   Hematological: Does not bruise/bleed easily.       Physical Exam     Initial Vitals [04/05/19 1214]   BP Pulse Resp Temp SpO2   119/83 102 (!) 24 100.3 °F (37.9 °C) 96 %      MAP       --         Physical Exam    Nursing note and vitals reviewed.    Gen/Constitutional: Interactive. No acute distress  Head: Normocephalic, Atraumatic  Neck: supple, no masses or LAD  Eyes: PERRLA, conjunctiva clear  Ears, Nose and Throat: No rhinorrhea or stridor.  Cardiac: Reg Rhythm, No murmur  Pulmonary: CTA Bilat, no wheezes, rhonchi, rales.  GI: Abdomen soft, non-tender, non-distended; no rebound or guarding  : No CVA tenderness.  Musculoskeletal: Extremities warm, well perfused, no erythema, no edema  Skin: No rashes  Neuro: Alert and Oriented x 3; No focal motor or sensory deficits.    Psych: Normal affect    ED Course   Procedures  Labs Reviewed   CBC W/ AUTO DIFFERENTIAL - Abnormal; Notable for the following components:       Result Value    RBC 3.49 (*)     Hemoglobin 9.0 (*)     Hematocrit 30.9 (*)     MCH 25.8 (*)     MCHC 29.1 (*)     RDW 14.9 (*)     Platelets 118 (*)     Gran% 73.7 (*)     Lymph% 14.4 (*)     All other components within normal limits   COMPREHENSIVE METABOLIC PANEL - Abnormal; Notable for the following components:    Glucose 132 (*)     Albumin 3.3 (*)     Alkaline Phosphatase 210 (*)     All other components within normal limits   ISTAT PROCEDURE - Abnormal; Notable for the following components:    POC PH 7.288 (*)     POC PCO2 51.9 (*)     POC PO2 37 (*)     POC SATURATED O2 63 (*)     POC Lactate 0.39 (*)     All other components within normal limits   INFLUENZA  A & B BY MOLECULAR   RESPIRATORY VIRAL PANEL BY PCR   LACTIC ACID, PLASMA   URINALYSIS, REFLEX TO URINE CULTURE    Narrative:     Preferred Collection Type->Urine, Clean Catch  yellow and gray tube   B-TYPE NATRIURETIC PEPTIDE   MAGNESIUM   PHOSPHORUS   PROCALCITONIN   TROPONIN I   LACTIC ACID, PLASMA   CMV DNA, QUANTITATIVE, PCR     EKG Readings: (Independently Interpreted)   1:22 PM  Normal sinus rhythm at 93 BPM. Non-specific T-wave abnormalities in lateral leads. No STEMI. Normal intervals.     ECG Results          EKG 12-lead (In process)  Result time 04/05/19 13:21:14    In process by Interface, Lab In [facility] (04/05/19 13:21:14)                 Narrative:    Test Reason : R06.02,    Vent. Rate : 089 BPM     Atrial Rate : 089 BPM     P-R Int : 150 ms          QRS Dur : 074 ms      QT Int : 330 ms       P-R-T Axes : 073 080 061 degrees     QTc Int : 401 ms    Normal sinus rhythm  Possible Left atrial enlargement  T wave abnormality, consider lateral ischemia  Abnormal ECG  When compared with ECG of 05-DEC-2018 04:43,  No significant change was found    Referred By: [email or web address]   SELF           Confirmed By:                             Imaging Results          X-Ray Chest PA And Lateral (Final result)  Result time 04/05/19 14:42:04    Final result by Pradip Cohen MD (04/05/19 14:42:04)                 Impression:      See above      Electronically signed by: Pradip Cohen MD  Date:    04/05/2019  Time:    14:42             Narrative:    EXAMINATION:  XR CHEST PA AND LATERAL    CLINICAL HISTORY:  Shortness of breath    TECHNIQUE:  PA and lateral views of the chest were performed.    COMPARISON:  No 01/09/2019 ne    FINDINGS:  Postoperative changes as before.  Central venous catheter in the SVC.  Heart size normal.  The lungs are clear.  No pleural effusion.                                 Medical Decision Making:   History:   I obtained history from: someone other than patient.  Old Medical Records: I decided to  obtain old medical records.  Old Records Summarized: records from clinic visits, records from previous admission(s) and records from another hospital.  Initial Assessment:   29 year old female with history of hypertension, cystic fibrosis on transplant list (already had one bilateral lung transplant in 2014), bronchiolitis obliterans syndrome, and asthma presents to the ED with complaints of shortness of breath, and associated cough and congestion.  Differential Diagnosis:   Differential diagnosis includes but is not limited to:  Pneumonia, bronchiectasis, bacteremia, viral syndrome, sinusitis.      Independently Interpreted Test(s):   I have ordered and independently interpreted X-rays - see prior notes.  I have ordered and independently interpreted EKG Reading(s) - see prior notes  Clinical Tests:   Lab Tests: Ordered and Reviewed  Radiological Study: Ordered and Reviewed  Medical Tests: Ordered and Reviewed  Sepsis Perfusion Assessment: I attest, a sepsis perfusion exam was performed within 6 hours of Septic Shock presentation, following fluid resuscitation.  Other:   I have discussed this case with another health care provider.       <> Summary of the Discussion: Lung transplant team    Emergent evaluation of patient presenting with fevers and concern for pneumonia in the setting of cystic fibrosis and lung transplant.  She is currently presenting with fevers and tachypnea.  Physical exam findings reveal tachypnea, coarse breath sounds and low-grade fever.  She does not appear toxic but the concern for relapsing pulmonary infection is evident.  She continues to have sinus drainage with recent history of sinusitis.  Recent respiratory panel showed corona virus with sputum cultures positive for Pseudomonas.  Recent treatment with cefepime, but now presenting with low-grade fevers.  Will obtain septic workup to include IV fluids, labs, antibiotics, chest x-ray.  Discussed case with lung transplant team who will  evaluate the patient at bedside.  Chest x-ray reviewed with no obvious consolidation.  ECG with no signs of ischemia, doubt ACS or acute cardiac findings.  Placed on telemetry and cardiac monitoring with no arrhythmia or ischemia.  Will continue cefepime and vancomycin and admit to lung transplant team.  Considering pneumonia, bronchiectasis, sinusitis, bacteremia in this high-risk individual whose immunosuppressed awaiting repeat lung transplant.  Patient agreeable with admission plan.  At this time nontoxic and hemodynamically stable at the time of admission.    Complexity:  High -level 5          Scribe Attestation:   Scribe #1: I performed the above scribed service and the documentation accurately describes the services I performed. I attest to the accuracy of the note.    I, Dr. Julio C Foster, personally performed the services described in this documentation. All medical record entries made by the scribe were at my direction and in my presence.  I have reviewed the chart and agree that the record reflects my personal performance and is accurate and complete.              Clinical Impression:       ICD-10-CM ICD-9-CM   1. Lung transplant status Z94.2 V42.6   2. SOB (shortness of breath) R06.02 786.05   3. Lower respiratory tract infection J22 519.8   4. Immunosuppression D89.9 279.9   5. Lung replaced by transplant Z94.2 V42.6         Disposition:   Disposition: Admitted  Condition: Serious       Julio C Foster DO  Dept of Emergency Medicine   [facility]  Spectralink: [id number]                      Julio C Foster DO  04/07/19 1822

## 2019-04-05 NOTE — TELEPHONE ENCOUNTER
"Received a call from Gisel stating, "I have been trying to get there for the swab and sputum sample, but I am significantly worse.  I think I need to be in the hospital."  Instructed Gisel to go to the ER.    Notified FELICIA Ramsey, and Dr. Mae of above.    "

## 2019-04-06 PROBLEM — H60.502 ACUTE OTITIS EXTERNA OF LEFT EAR: Status: ACTIVE | Noted: 2019-01-01

## 2019-04-06 NOTE — PROGRESS NOTES
[facility]  Lung Transplant  Progress Note - Floor    Patient Name: Juanita Ibarra  MRN: [id number]  Admission Date: 4/5/2019  Hospital Length of Stay: 1 days  Post-Operative Day: 1759  Attending Physician: Wiliam Mae MD  Primary Care Provider: Primary Doctor No     Subjective:     Interval History: No acute events overnight. Patient reports ongoing cough, wheezing and chest/sinus congestion. Cough now dry in nature. She also reports right sided pleuritic chest pain unchanged from admission.     Continuous Infusions:  Scheduled Meds:   calcium-vitamin D3  1 tablet Oral BID WM    ceFEPime (MAXIPIME) IVPB  2 g Intravenous Q8H    dapsone  100 mg Oral Daily    enoxaparin  40 mg Subcutaneous Daily    fluticasone  2 spray Each Nare Daily    fluticasone-vilanterol  1 puff Inhalation Daily    folic acid  1,000 mcg Oral Daily    gabapentin  300 mg Oral TID    levalbuterol  1.25 mg Nebulization TID    lipase-protease-amylase 12,000-38,000-60,000 units  4 capsule Oral TID WM    magnesium oxide  400 mg Oral BID    montelukast  10 mg Oral Daily    morphine  15 mg Oral BID    multivit-iron-FA-calcium-mins  1 tablet Oral BID    polyethylene glycol  17 g Oral BID    predniSONE  5 mg Oral Daily    QUEtiapine  25 mg Oral Daily    QUEtiapine  50 mg Oral QHS    tacrolimus  2.5 mg Oral BID    topiramate  25 mg Oral BID    vancomycin (VANCOCIN) IVPB  1,000 mg Intravenous Q12H    venlafaxine  150 mg Oral QHS    venlafaxine  37.5 mg Oral QHS     PRN Meds:acetaminophen, albuterol-ipratropium, butalbital-acetaminophen-caffeine -40 mg, diphenhydrAMINE, diphenhydrAMINE, LORazepam, magnesium sulfate IVPB **AND** magnesium sulfate IVPB, ondansetron, oxyCODONE, potassium chloride 10% **AND** potassium chloride 10% **AND** potassium chloride 10%, promethazine, tiZANidine    Review of patient's allergies indicates:   Allergen Reactions    Albuterol Palpitations    Colistin Anaphylaxis     Vancomycin analogues      Infusion reaction that does not resolve with slowing    Neupogen [filgrastim] Other (See Comments)     Ostealgia after five daily doses of 300 mcg.      Bactrim [sulfamethoxazole-trimethoprim] Hives    Ceftazidime Hives     Pt stated can tolerate cefapine not ceftazidime    Ceftazidime     Dronabinol Other (See Comments)     Mental changes/hallucinations    Haldol [haloperidol lactate] Other (See Comments)     Seizure like activity    Nsaids (non-steroidal anti-inflammatory drug)      Cannot have due to lung transplant    Adhesive Rash     Cloth tape- please use tegaderm or paper tape    Aztreonam Rash    Ciprofloxacin Nausea And Vomiting     Projectile N/V, per patient.  Unwilling to retry therapy.       Review of Systems   Constitutional: Positive for activity change, appetite change and fatigue. Negative for chills and fever.   HENT: Positive for congestion, rhinorrhea, sinus pain and tinnitus. Negative for ear discharge, ear pain and sore throat.    Eyes: Negative for discharge and redness.   Respiratory: Positive for cough, shortness of breath and wheezing. Negative for chest tightness.    Cardiovascular: Positive for chest pain. Negative for palpitations and leg swelling.   Gastrointestinal: Positive for nausea. Negative for abdominal pain, constipation, diarrhea and vomiting.   Endocrine: Negative for polydipsia and polyuria.   Genitourinary: Negative for difficulty urinating, dysuria, frequency and urgency.   Musculoskeletal: Positive for myalgias. Negative for arthralgias, neck pain and neck stiffness.   Skin: Positive for pallor. Negative for rash.   Allergic/Immunologic: Positive for immunocompromised state.   Neurological: Positive for headaches. Negative for dizziness, weakness, light-headedness and numbness.   Hematological: Negative for adenopathy. Does not bruise/bleed easily.   Psychiatric/Behavioral: Negative for confusion, decreased concentration and  hallucinations.     Objective:   Physical Exam   Constitutional: She is oriented to person, place, and time. She appears well-developed and well-nourished. She has a sickly appearance. No distress. Nasal cannula in place.   HENT:   Head: Normocephalic and atraumatic.   Right Ear: External ear normal.   Left Ear: External ear normal.   Nose: Nose normal.   Mouth/Throat: Oropharynx is clear and moist. No oropharyngeal exudate.   Eyes: Conjunctivae and EOM are normal. Right eye exhibits no discharge. Left eye exhibits no discharge.   Neck: Normal range of motion. Neck supple. No JVD present.   Cardiovascular: Regular rhythm. Tachycardia present. Exam reveals no friction rub.   No murmur heard.  Pulmonary/Chest: Tachypnea noted. She has no decreased breath sounds. She has wheezes in the right upper field and the left upper field. She has rhonchi. She has no rales.   Abdominal: Soft. Bowel sounds are normal. She exhibits distension (minimal). There is no tenderness.   Musculoskeletal: Normal range of motion. She exhibits no edema.   Lymphadenopathy:     She has no cervical adenopathy.   Neurological: She is alert and oriented to person, place, and time. No cranial nerve deficit.   Skin: Skin is warm and dry. Capillary refill takes less than 2 seconds. She is not diaphoretic. There is pallor.   Psychiatric: She has a normal mood and affect. Her behavior is normal. Judgment and thought content normal.   Nursing note and vitals reviewed.        Vital Signs (Most Recent):  Temp: 98.3 °F (36.8 °C) (04/06/19 1209)  Pulse: 91 (04/06/19 1337)  Resp: 17 (04/06/19 1337)  BP: 109/66 (04/06/19 1209)  SpO2: 97 % (04/06/19 1337) Vital Signs (24h Range):  Temp:  [98.1 °F (36.7 °C)-98.7 °F (37.1 °C)] 98.3 °F (36.8 °C)  Pulse:  [77-97] 91  Resp:  [15-22] 17  SpO2:  [94 %-98 %] 97 %  BP: (107-129)/(66-81) 109/66     Weight: 54.4 kg (120 lb)  Body mass index is 23.44 kg/m².      Intake/Output Summary (Last 24 hours) at 4/6/2019 9274  Last  data filed at 4/6/2019 0559  Gross per 24 hour   Intake 1280 ml   Output --   Net 1280 ml       Significant Labs:  CBC:  Recent Labs   Lab 04/06/19  0630   WBC 6.93   RBC 3.05*   HGB 7.8*   HCT 27.4*   *   MCV 90   MCH 25.6*   MCHC 28.5*     BMP:  Recent Labs   Lab 04/06/19  0630      K 3.8      CO2 26   BUN 10   CREATININE 0.8   CALCIUM 8.6*      Tacrolimus Levels:  Recent Labs   Lab 04/06/19  0630   TACROLIMUS 9.0     Microbiology:  Microbiology Results (last 7 days)     Procedure Component Value Units Date/Time    Blood culture x two cultures. Draw prior to antibiotics. [[id number]] Collected:  04/05/19 1429    Order Status:  Completed Specimen:  Blood from Line, Subclavian, Right Updated:  04/06/19 1612     Blood Culture, Routine No Growth to date     Blood Culture, Routine No Growth to date    Narrative:       Aerobic and anaerobic    Blood culture x two cultures. Draw prior to antibiotics. [[id number]] Collected:  04/05/19 1400    Order Status:  Completed Specimen:  Blood from Line, Subclavian, Right Updated:  04/06/19 1612     Blood Culture, Routine No Growth to date     Blood Culture, Routine No Growth to date    Narrative:       Aerobic and anaerobic    Culture, Respiratory with Gram Stain [[id number]] Collected:  04/05/19 1429    Order Status:  Completed Specimen:  Respiratory from Sputum Updated:  04/06/19 0739     Respiratory Culture Insufficient incubation, culture in progress     Gram Stain (Respiratory) <10 epithelial cells per low power field.     Gram Stain (Respiratory) Many WBC's     Gram Stain (Respiratory) Many Gram positive cocci     Gram Stain (Respiratory) Rare Gram positive rods     Gram Stain (Respiratory) Rare yeast    Influenza A & B by Molecular [[id number]] Collected:  04/05/19 1429    Order Status:  Completed Specimen:  Nasopharyngeal Swab Updated:  04/05/19 1521     Influenza A, Molecular Negative     Influenza B, Molecular Negative     Flu A & B Source Nasal swab     Respiratory Viral Panel by PCR [facility]; Nasal Swab [[id number]] Collected:  04/05/19 7841    Order Status:  Sent Specimen:  Respiratory Updated:  04/05/19 0895          I have reviewed all pertinent labs within the past 24 hours.    Diagnostic Results:  Labs: Reviewed  X-Ray: Reviewed      Assessment/Plan:     * Lower respiratory tract infection  Respiratory culture from sputum and RVP pending. Blood cultures with NGTD.  Influenza testing negative. Procalcitonin negative. No leukocytosis. CXR without acute process. Continue empiric Vanc/Cef and follow up on cultures.     Lung replaced by transplant  Patient s/p BLT secondary to CF in 2014 with known CARLOS EDUARDO and currently undergoing workup for re-transplantation. Will plan to continue current immunosuppression and prophylaxis. Will follow up on pending items of lung transplant workup while inpatient.     Immunosuppression  Continue tacrolimus and prednisone. Currently off MMF secondary to recurrent infections. Will monitor daily tacrolimus levels and dose adjust accordingly.     Prophylactic antibiotic  Continue dapsone.     Sinusitis, chronic  Continue Flonase and Singulair. Followed by Dr. Olsen outpatient. ENT consulted, appreciate recs.     Chronic pain with opiate use  Established with pain management outpatient. Continue current regimen.     CF related Pancreatic insufficiency  Continue Creon with meals and snacks.         Shama Canales PA-C  Lung Transplant  [facility]

## 2019-04-06 NOTE — ASSESSMENT & PLAN NOTE
Respiratory culture from sputum and RVP pending. Blood cultures with NGTD.  Influenza testing negative. Procalcitonin negative. No leukocytosis. CXR without acute process. Continue empiric Vanc/Cef and follow up on cultures.

## 2019-04-06 NOTE — ASSESSMENT & PLAN NOTE
Will consider ENT consult while inpatient as sinusitis is likely the source of her recurrent pulmonary infections. Continue Flonase and Singulair.

## 2019-04-06 NOTE — ASSESSMENT & PLAN NOTE
Patient s/p BLT secondary to CF in 2014 with known CARLOS EDUARDO and currently undergoing workup for re-transplantation. Will plan to continue current immunosuppression and prophylaxis. Will follow up on pending items of lung transplant workup while inpatient.

## 2019-04-06 NOTE — SUBJECTIVE & OBJECTIVE
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       Past Surgical History:   Procedure Laterality Date    ABDOMINAL SURGERY      peg tube     RDILBECU-NBMEZEOHEYK-NKKECDQAMUCH N/A 5/19/2015    Performed [facility]y Dias Jr[facility] OR    BIOPSY-BRONCHUS N/A 12/20/2016    Performed by Jake [facility]varez [facility] at [facility] OR 2ND FLR    BRONCHOSCOPY Bilateral 12/11/2018    Performed by Francisca Morin DO at [facility] OR 2ND FLR    BRONCHOSCOPY N/A 10/1/2018    Performed by Jake [facility]varez MD at [facility] OR 2ND FLR    BRONCHOSCOPY Bilateral 12/20/2016    Performed by Jake [facility] at [facility] OR [facility]ND [facility]    BRONCHOSCOPY - flexible bronchoscopy with probable tissue biopsy CPT 36459 N/A 7/21/2015    Performed by Jake [facility] at [facility] OR [facility]ND [facility]LR    BRONCHOSCOPY - flexible bronchoscopy with probable tissue biospy CPT 19277 N/A 10/20/2015    Performed by Jake [facility]varez [facility] at [facility] OR 2ND FLR    BRONCHOSCOPY - flexible bronchoscopy with tissue biopsy CPT 14167 N/A 9/29/2015    Performed by Jake [facility] at [facility] OR [facility]ND FLR    BRONCHOSCOPY - flexible bronchoscopy with tissue biopsy CPT 46475 N/A 5/26/2015    Performed by Jake [facility]varez MD at [facility] OR 1ST FLR    BRONCHOSCOPY flexible bronchoscopy with tissue biopsy N/A 9/8/2014    Performed by Jake [facility]varez MD at [facility] OR 2ND FLR    BRONCHOSCOPY flexible with possible tissue biopsy N/A 8/8/2014    Performed by Jake [facility]varez MD at [facility] OR 2ND FLR    BRONCHOSCOPY, BAL, BIOPSIES N/A 3/20/2018    Performed by Jake [facility]varez [facility] at [facility] OR  2ND FLR    BRONCHOSCOPY-FIBEROPTIC N/A 1/29/2016    Performed by Joann Cifuentes DO at [facility] OR 2ND FLR    BRONCHOSCOPY; Bronchoscopy with BAL and transbronchial biopsies under general anesthesia N/A 5/29/2018    [facility]    CHOLECYSTECTOMY  2016    CHOLECYSTECTOMY-LAPAROSCOPIC N/A 7/22/2016    Per[facility] Goldberg[facility]    ENDOMETRIAL ABLATION  2015    [facility]    FESS      FESS Bilateral 10/21/2013    Performed by Jared Whatley MD at [facility] OR [address] [place] [address]    FINE NEEDLE ASPIRATION (FNA)  of a cervical lymphadenopathy  1/29/2016    Performed by Joann Cifuentes DO at [facility] OR [address] [place] [address]    flex bronchoscopy with probable tissue biopsy N/A 7/31/2014    Performed by [facility] at [facility] OR [address] [place] [address]    flexible bronchoscopy with tissue biopsy N/A 12/11/2014    [facility]    HYSTERECTOMY  04/2017    [facility]    INJECTION-VOCAL CORD Left 6/24/2014    Performed by Jared Whatley MD at [facility] OR [address] [place] [address]    [id number]-PTOH-E-CATH to right chest and removal of portacath to left chests wall. Bilateral 6/24/2014    Performed by Zhen Dorado MD at [facility] OR [address] [place] [address]    LARYNX SURGERY  2016    LUNG TRANSPLANT Bilateral 6/12/14    MYRINGOTOMY W/ TUBES Right 04/2017    MYRINGOTOMY WITH INSERTION OF PE TUBES Right 4/15/2017    Per[facility] Gary [facility]    PLACEMENT-TUBE-PEG  5/15/2014    Performed by David Moses [facility]    PORTACATH PLACEMENT Right     rt sc    REMOVAL-TUBE-PEG  5/15/2014    Performed by David Moses MD at [facility] OR 2ND FLR    RHC for lung re-transplant N/A 1/22/2019    Performed by Laura Rachel MD at [facility] CATH LAB    ROBOTIC ASSISTED LAPAROSCOPIC HYSTERECTOMY N/A 4/25/2017    Performed by Deny Dias Jr[facility] MD at [facility] OR    SALPINGECTOMY Bilateral 2015    [facility]    SALPINGECTOMY-LAPAROSCOPIC Bilateral 5/19/2015     Performed by [facility] at [facility] OR    SINUS SURGERY FUNCTIONAL ENDOSCOPIC WITH NAVIGATION Bilateral 4/3/2017    Performed by Cesar Olsen MD at [facility] OR [address] [place] [address]    SINUS SURGERY FUNCTIONAL ENDOSCOPIC WITH NAVIGATION, 67258, 19520, 40176, 33173 Bilateral 2/5/2015    [facility]    THYROPLASTY - Medialization laryngoplasty, cricothyroid subluxation, arytenoid repositioning Left 8/2/2016    [facility]    TRANSPLANT-LUNG Bilateral 6/11/2014    Performed by Adolfo Huston MD at [facility] OR [address] [place] [address]       Review of patient's allergies indicates:   Allergen Reactions    Albuterol Palpitations    Colistin Anaphylaxis    Vancomycin analogues      Infusion reaction that does not resolve with slowing    Neupogen [filgrastim] Other (See Comments)     Ostealgia after five daily doses of 300 mcg.      Bactrim [sulfamethoxazole-trimethoprim] Hives    Ceftazidime Hives     Pt stated can tolerate cefapine not ceftazidime    Ceftazidime     Dronabinol Other (See Comments)     Mental changes/hallucinations    Haldol [haloperidol lactate] Other (See Comments)     Seizure like activity    Nsaids (non-steroidal anti-inflammatory drug)      Cannot have due to lung transplant    Adhesive Rash     Cloth tape- please use tegaderm or paper tape    Aztreonam Rash    Ciprofloxacin Nausea And Vomiting     Projectile N/V, per patient.  Unwilling to retry therapy.         (Not in a hospital admission)  Family History     Problem Relation (Age of Onset)    Diabetes Maternal Grandfather    Drug abuse Maternal Grandfather    Hypertension Maternal Grandmother    Thrombocytopenia Maternal Grandfather        Tobacco Use    Smoking status: Never Smoker    Smokeless tobacco: Never Used   Substance and Sexual Activity    Alcohol use: No     Comment: Has not had an alcoholic drink in more than 2 months.    Drug use: No    Sexual activity:  Yes     Partners: Male     Birth control/protection: See Surgical Hx     Comment: current partner since Oct 2016     Review of Systems   Constitutional: Positive for activity change, appetite change, fatigue and fever. Negative for chills.   HENT: Positive for congestion, rhinorrhea, sinus pain and tinnitus. Negative for ear discharge, ear pain and sore throat.    Eyes: Negative for discharge and redness.   Respiratory: Positive for cough, shortness of breath and wheezing. Negative for chest tightness.    Cardiovascular: Positive for chest pain. Negative for palpitations and leg swelling.   Gastrointestinal: Positive for nausea. Negative for abdominal pain, constipation, diarrhea and vomiting.   Endocrine: Negative for polydipsia and polyuria.   Genitourinary: Negative for difficulty urinating, dysuria, frequency and urgency.   Musculoskeletal: Positive for myalgias. Negative for arthralgias, neck pain and neck stiffness.   Skin: Positive for pallor. Negative for rash.   Allergic/Immunologic: Positive for immunocompromised state.   Neurological: Positive for headaches. Negative for dizziness, weakness, light-headedness and numbness.   Hematological: Negative for adenopathy. Does not bruise/bleed easily.   Psychiatric/Behavioral: Negative for confusion, decreased concentration and hallucinations.     Objective:     Vital Signs (Most Recent):  Temp: 100.3 °F (37.9 °C) (04/05/19 1214)  Pulse: 82 (04/05/19 1605)  Resp: (!) 22 (04/05/19 1605)  BP: 115/72 (04/05/19 1605)  SpO2: (!) 94 % (04/05/19 1605) Vital Signs (24h Range):  Temp:  [100.3 °F (37.9 °C)] 100.3 °F (37.9 °C)  Pulse:  [] 82  Resp:  [22-34] 22  SpO2:  [94 %-97 %] 94 %  BP: (104-119)/(71-83) 115/72     Weight: 54.4 kg (120 lb)  Body mass index is 23.44 kg/m².    No intake or output data in the 24 hours ending 04/05/19 1923    Physical Exam   Constitutional: She is oriented to person, place, and time. She appears well-developed and well-nourished. She  has a sickly appearance. No distress. Nasal cannula in place.   HENT:   Head: Normocephalic and atraumatic.   Right Ear: External ear normal.   Left Ear: External ear normal.   Nose: Nose normal.   Mouth/Throat: Oropharynx is clear and moist. No oropharyngeal exudate.   Eyes: Conjunctivae and EOM are normal. Right eye exhibits no discharge. Left eye exhibits no discharge.   Neck: Normal range of motion. Neck supple. No JVD present.   Cardiovascular: Regular rhythm. Tachycardia present. Exam reveals no friction rub.   No murmur heard.  Pulmonary/Chest: Tachypnea noted. She has no decreased breath sounds. She has wheezes in the right upper field and the left upper field. She has rhonchi. She has no rales.   Abdominal: Soft. Bowel sounds are normal. She exhibits distension (minimal). There is no tenderness.   Musculoskeletal: Normal range of motion. She exhibits no edema.   Lymphadenopathy:     She has no cervical adenopathy.   Neurological: She is alert and oriented to person, place, and time. No cranial nerve deficit.   Skin: Skin is warm and dry. Capillary refill takes less than 2 seconds. She is not diaphoretic. There is pallor.   Psychiatric: She has a normal mood and affect. Her behavior is normal. Judgment and thought content normal.   Nursing note and vitals reviewed.      Significant Labs:  CBC:  Recent Labs   Lab 04/05/19  1400   WBC 9.75   RBC 3.49*   HGB 9.0*   HCT 30.9*   *   MCV 89   MCH 25.8*   MCHC 29.1*     BMP:  Recent Labs   Lab 04/05/19  1400      K 3.9      CO2 25   BUN 14   CREATININE 0.9   CALCIUM 8.7        I have reviewed all pertinent labs within the past 24 hours.    Diagnostic Results:  Labs: Reviewed  X-Ray: Reviewed   observation/breast augmentation/patient stated

## 2019-04-06 NOTE — HPI
"Mrs. 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 Patient presents to the ED today due to worsening complaints of chest congestion, dyspnea, and sinus congestion. Patient reports completion of 14 day course of IV cefepime around 3/19/2019 and initially noted symptomatic improvement with stable oxygenation following treatment for approximately one week. However, over the past week patient noticed productive cough with brown to yellow sputum production, increasing oxygen requirements to 3L via NC, and pleuritic chest pain triggered by tussive episodes. Patient reports that today her cough as been more dry and states that sputum production is not currently possible secondary to pleuritic chest pain. Patient also endorses complaints of green rhinorrhea, worsening sinus congestion, post-nasal drainage, and bilateral "ringing" /tinnitus in her ears. Patient endorses sinus headaches, increasing malaise/fatigue, generalized myalgias, increasing nausea, and poor PO intake. Denies any abdominal pain, diarrhea, constipation. Denies any sore throat, ear discharge. She reports being informed by her PCP that her tympanostomy tubes appear to be "falling out". Patient reports last ENT visit in January with Dr. Olsen.     In ED, WBC and procalcitonin were WNL; however, patient was noted to be febrile with Tmax of 100.3 and tachycardia to low 100s. Patient with tachypnea and O2 sats of 94% on 4L via NC. Of note, patient did have Coronavirus isolated from RVP on 2/26/2019. Will plan for admission to begin IV cefepime and vancomycin to monitor patient's status.   "

## 2019-04-06 NOTE — PLAN OF CARE
Problem: Adult Inpatient Plan of Care  Goal: Plan of Care Review  Outcome: Ongoing (interventions implemented as appropriate)    Pt AAOx 4.  has gone home for the night.  Admitted through ED for worsening productive cough and chest & sinus congestion.  Infectious work up initiated in ED. NGTD. Flu swab negative.  Pt started on IV cef and vanc based on most recent respiratory infection sensitivities.  VSS. Satting 95-96% on 4L NC. Pt has remained afebrile.  R SC port CDI.  No acute changes over night.  Pt c/o pleuritic chest pain. Pt given Oxy 10 Q4 PRN.   Pt has remained free from falls or injury thus far. Bed is in low/ locked position, side rails are up x 2, call light is in reach. Will continue to monitor.

## 2019-04-06 NOTE — SUBJECTIVE & OBJECTIVE
Medications:  Continuous Infusions:  Scheduled Meds:   calcium-vitamin D3  1 tablet Oral BID WM    ceFEPime (MAXIPIME) IVPB  2 g Intravenous Q8H    dapsone  100 mg Oral Daily    enoxaparin  40 mg Subcutaneous Daily    fluticasone  2 spray Each Nare Daily    fluticasone-vilanterol  1 puff Inhalation Daily    folic acid  1,000 mcg Oral Daily    gabapentin  300 mg Oral TID    levalbuterol  1.25 mg Nebulization TID    lipase-protease-amylase 12,000-38,000-60,000 units  4 capsule Oral TID WM    magnesium oxide  400 mg Oral BID    montelukast  10 mg Oral Daily    morphine  15 mg Oral BID    multivit-iron-FA-calcium-mins  1 tablet Oral BID    polyethylene glycol  17 g Oral BID    predniSONE  5 mg Oral Daily    QUEtiapine  25 mg Oral Daily    QUEtiapine  50 mg Oral QHS    tacrolimus  2.5 mg Oral BID    topiramate  25 mg Oral BID    vancomycin (VANCOCIN) IVPB  1,000 mg Intravenous Q12H    venlafaxine  150 mg Oral QHS    venlafaxine  37.5 mg Oral QHS     PRN Meds:acetaminophen, albuterol-ipratropium, butalbital-acetaminophen-caffeine -40 mg, diphenhydrAMINE, diphenhydrAMINE, LORazepam, magnesium sulfate IVPB **AND** magnesium sulfate IVPB, ondansetron, oxyCODONE, potassium chloride 10% **AND** potassium chloride 10% **AND** potassium chloride 10%, promethazine, tiZANidine     No current facility-administered medications on file prior to encounter.      Current Outpatient Medications on File Prior to Encounter   Medication Sig    acetaminophen (TYLENOL) 500 MG tablet Take 1,000 mg by mouth every 6 (six) hours as needed for Pain.    amLODIPine (NORVASC) 10 MG tablet Take 1 tablet (10 mg total) by mouth once daily.    butalbital-acetaminophen-caffeine -40 mg (FIORICET, ESGIC) -40 mg per tablet Take 1 tablet by mouth every 6 (six) hours as needed for Pain.    calcium-vitamin D3 (OS-KATY 500 + D3) 500 mg(1,250mg) -200 unit per tablet Take 1 tablet by mouth 2 (two) times daily with meals.     dapsone 100 MG Tab Take 1 tablet (100 mg total) by mouth once daily.    diphenhydrAMINE (BENADRYL) 50 MG tablet Take 1 tablet (50 mg total) by mouth every 6 (six) hours as needed for Itching.    DULERA 100-5 mcg/actuation HFAA INHALE 1 PUFF BY MOUTH TWICE DAILY    fexofenadine (ALLEGRA) 180 MG tablet Take 180 mg by mouth once daily.    fluticasone (FLONASE) 50 mcg/actuation nasal spray INSERT 2 SPRAYS IN EACH NOSTRIL DAILY    folic acid (FOLVITE) 1 MG tablet Take 1 tablet (1,000 mcg total) by mouth once daily.    gabapentin (NEURONTIN) 300 MG capsule Take 1 capsule (300 mg total) by mouth 3 (three) times daily.    levalbuterol (XOPENEX) 1.25 mg/3 mL nebulizer solution Take 3 mLs (1.25 mg total) by nebulization every 8 (eight) hours as needed for Wheezing or Shortness of Breath. Rescue    lipase-protease-amylase (CREON) 36,000-114,000- 180,000 unit CpDR Take 4 capsules by mouth 3 (three) times daily with meals. Take 3 with snacks prn. (Patient taking differently: Take 5 capsules by mouth 3 (three) times daily with meals. Take 3 with snacks prn.)    LORazepam (ATIVAN) 2 MG Tab Take 1 tablet by mouth every 8 hours as needed for ANXIETY    magnesium oxide (MAG-OX) 400 mg (241.3 mg magnesium) tablet Take 1 tablet (400 mg total) by mouth 2 (two) times daily.    metoprolol tartrate (LOPRESSOR) 25 MG tablet Take 1 tablet (25 mg total) by mouth 2 (two) times daily.    montelukast (SINGULAIR) 10 mg tablet Take 1 tablet (10 mg total) by mouth once daily.    morphine (MS CONTIN) 15 MG 12 hr tablet Take 15 mg by mouth 2 (two) times daily.     mycophenolate (CELLCEPT) 250 mg Cap Take 2 capsules (500 mg total) by mouth 2 (two) times daily.    omeprazole (PRILOSEC) 40 MG capsule Take 1 capsule by mouth once daily    ondansetron (ZOFRAN) 8 MG tablet Take 1 tablet (8 mg total) by mouth every 12 (twelve) hours as needed for Nausea.    oxycodone (ROXICODONE) 5 MG immediate release tablet Take 10 mg by mouth every 4  (four) hours as needed for Pain.    predniSONE (DELTASONE) 5 MG tablet Take 1 tablet (5 mg total) by mouth once daily.    promethazine (PHENERGAN) 25 MG tablet Take 1 tablet (25 mg total) by mouth every 6 (six) hours as needed for Nausea.    QUEtiapine (SEROQUEL) 25 MG Tab Take 1 tablet by mouth in morning and 2 tablets at bedtime    tacrolimus (PROGRAF) 0.5 MG Cap Take 1 capsule (0.5 mg total) by mouth every 12 (twelve) hours.    tacrolimus (PROGRAF) 1 MG Cap Take 2 capsules (2 mg total) by mouth every 12 (twelve) hours. Daily doses: 2.5 mg twice daily.    tiZANidine (ZANAFLEX) 4 MG tablet TAKE 2 TABLETS BY MOUTH EVERY 6 HOURS AS NEEDED    topiramate (TOPAMAX) 25 MG tablet Take 1 tablet (25 mg total) by mouth 2 (two) times daily.    venlafaxine (EFFEXOR-XR) 150 MG Cp24 Take 1 capsule (150 mg total) by mouth once daily.    venlafaxine (EFFEXOR-XR) 37.5 MG 24 hr capsule Take 1 capsule (37.5 mg total) by mouth once daily.    clindamycin (CLEOCIN) 300 MG capsule Take 2 capsules (600 mg total) by mouth every 6 (six) hours.    diphenhydrAMINE (BENADRYL) 50 MG capsule Take 50 mg by mouth.    fluconazole (DIFLUCAN) 150 MG Tab TAKE 1 TABLET BY MOUTH EVERY WEEK    inhalation spacing device (PROCHAMBER) USE AS DIRECTED    levalbuterol (XOPENEX HFA) 45 mcg/actuation inhaler Inhale 1-2 puffs into the lungs.    multivit,min52-folic-vitK-cQ10 (AQUADEKS) 100-700-10 mcg-mcg-mg Cap cap 1 tab twice daily    multivitamin-minerals no.55 (CENTRUM FLAVOR BURST ADULT) Chew     omeprazole (PRILOSEC) 40 MG capsule Take 40 mg by mouth 2 (two) times daily.    polyethylene glycol (GLYCOLAX) 17 gram PwPk Take 17 g by mouth 2 (two) times daily.       Review of patient's allergies indicates:   Allergen Reactions    Albuterol Palpitations    Colistin Anaphylaxis    Vancomycin analogues      Infusion reaction that does not resolve with slowing    Neupogen [filgrastim] Other (See Comments)     Ostealgia after five daily doses  of 300 mcg.      Bactrim [sulfamethoxazole-trimethoprim] Hives    Ceftazidime Hives     Pt stated can tolerate cefapine not ceftazidime    Ceftazidime     Dronabinol Other (See Comments)     Mental changes/hallucinations    Haldol [haloperidol lactate] Other (See Comments)     Seizure like activity    Nsaids (non-steroidal anti-inflammatory drug)      Cannot have due to lung transplant    Adhesive Rash     Cloth tape- please use tegaderm or paper tape    Aztreonam Rash    Ciprofloxacin Nausea And Vomiting     Projectile N/V, per patient.  Unwilling to retry therapy.       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     Past Surgical History:   Procedure Laterality Date    ABDOMINAL SURGERY      peg tube     [id number] N/A 5/19/2015    Performed by [facility]y Dias [facility] OR    BIOPSY-BRONCHUS N/A 12/20/2016    Performed by Jake [facility]varez MD at [facility] OR 2ND [facility]    BRONCHOSCOPY Bilateral 12/11/2018    Performed by Francisca Morin DO at [facility] OR [facility]    BRONCHOSCOPY N/A 10/1/2018    Performed by Jake [facility]varez MD at [facility] OR [facility]    BRONCHOSCOPY Bilateral 12/20/2016    Performed by Jake [facility]varez MD at [facility] OR [facility]    BRONCHOSCOPY - flexible bronchoscopy with probable tissue biopsy CPT 58719 N/A 7/21/2015    Performed by Jake [facility]varez MD at [facility] OR [facility]    BRONCHOSCOPY - flexible bronchoscopy with probable tissue biospy CPT 20863 N/A 10/20/2015    Performed by Jake Alvarez MD at [facility] OR [facility]    BRONCHOSCOPY - flexible bronchoscopy  with tissue biopsy CPT 34782 N/A 9/29/2015    Performed by [facility] at [facility] OR [facility]    BRONCHOSCOPY - flexible bronchoscopy with tissue biopsy CPT 25318 N/A 5/26/2015    Performed by Jake Alvarez MD at [facility] OR 1ST [facility]LR    BRONCHOSCOPY flexible bronchoscopy with tissue biopsy N/A 9/8/2014    Performed by Jake Alvarez MD at [facility] OR [facility]    BRONCHOSCOPY flexible with possible tissue biopsy N/A 8/8/2014    [facility]    BRONCHOSCOPY, BAL, BIOPSIES N/A 3/20/2018    [facility]    BRONCHOSCOPY-FIBEROPTIC N/A 1/29/2016    Performed by Joann Cifuentes DO at [facility] OR [address] [place] [address]    BRONCHOSCOPY; Bronchoscopy with BAL and transbronchial biopsies under general anesthesia N/A 5/29/2018    Performed by Jake Alvarez MD at [facility] OR [address] [place] [address]    CHOLECYSTECTOMY  2016    CHOLECYSTECTOMY-LAPAROSCOPIC N/A 7/22/2016    Performed by Joshua Goldberg, MD at [facility] OR [address] [place] [address]    ENDOMETRIAL ABLATION  2015    KJB    FESS      FESS Bilateral 10/21/2013    Performed by Jared Whatley MD at [facility] OR [address] [place] [address]    FINE NEEDLE ASPIRATION (FNA)  of a cervical lymphadenopathy  1/29/2016    Performed by Joann Cifuentes DO at [facility] OR [address] [place] [address]    flex bronchoscopy with probable tissue biopsy N/A 7/31/2014    Performed by [facility] Diagnostic Provider at [facility] OR [address] [place] [address]    flexible bronchoscopy with tissue biopsy N/A 12/11/2014    Performed by Jake Alvarez MD at [facility] OR [address] [place] [address]    HYSTERECTOMY  04/2017    [facility]    INJECTION-VOCAL CORD Left 6/24/2014    Performed by Jared Whatley MD at [facility] OR [address] [place] [address]    [id number] to right chest and removal of portacath to left chests wall. Bilateral 6/24/2014    Performed by Zhen Dorado MD at [facility] OR [address] [place] [address]    LARYNX SURGERY  2016    LUNG TRANSPLANT Bilateral 6/12/14    MYRINGOTOMY W/ TUBES Right 04/2017    MYRINGOTOMY WITH INSERTION OF PE TUBES Right 4/15/2017     Performed by Gary Null MD at [facility] OR 2ND [facility]    PLACEMENT-TUBE-PEG  5/15/2014    [facility]    PORTACATH PLACEMENT Right     rt sc    REMOVAL-TUBE-PEG  5/15/2014    Performed by David Moses MD at [facility] OR [facility]ND [facility]    RHC for lung re-transplant N/A 1/22/2019    Performed by Laura Rachel MD at [facility] CATH LAB    ROBOTIC ASSISTED LAPAROSCOPIC HYSTERECTOMY N/A 4/25/2017    Performed by [facility]y Dias Jr[facility] MD at [facility] OR    SALPINGECTOMY Bilateral 2015    [facility]    SALPINGECTOMY-LAPAROSCOPIC Bilateral 5/19/2015    Performed by [facility]y Di[facility] Jr[facility] MD at [facility] OR    SINUS SURGERY FUNCTIONAL ENDOSCOPIC WITH NAVIGATION Bilateral 4/3/2017    [facility]    SINUS SURGERY FUNCTIONAL ENDOSCOPIC WITH NAVIGATION, 68479, 48809, 66980, 45746 Bilateral 2/5/2015    Performed by Cesar Olsen MD at [facility] OR [address] [place] [address]    THYROPLASTY - Medialization laryngoplasty, cricothyroid subluxation, arytenoid repositioning Left 8/2/2016    Performed by Gary Null MD at [facility] OR [facility]    TRANSPLANT-LUNG Bilateral 6/11/2014    Performed [facility]ust[facility]     Family History     Problem Relation (Age of Onset)    Diabetes Maternal Grandfather    Drug abuse Maternal Grandfather    Hypertension Maternal Grandmother    Thrombocytopenia Maternal Grandfather        Tobacco Use    Smoking status: Never Smoker    Smokeless tobacco: Never Used   Substance and Sexual Activity    Alcohol use: No     Comment: Has not had an alcoholic drink in more than 2 months.    Drug use: No    Sexual activity: Yes     Partners: Male     Birth control/protection: See Surgical Hx     Comment: current partner since Oct 2016     Review of Systems ENT-focused ROS completed and is negative unless otherwise stated in the HPI.     Objective:     Vital Signs (Most Recent):  Temp: 98.3 °F (36.8 °C) (04/06/19 1209)  Pulse: 91 (04/06/19  1337)  Resp: 17 (04/06/19 1337)  BP: 109/66 (04/06/19 1209)  SpO2: 97 % (04/06/19 1337) Vital Signs (24h Range):  Temp:  [98.1 °F (36.7 °C)-98.7 °F (37.1 °C)] 98.3 °F (36.8 °C)  Pulse:  [77-97] 91  Resp:  [15-22] 17  SpO2:  [94 %-98 %] 97 %  BP: (107-129)/(66-81) 109/66     Weight: 54.4 kg (120 lb)  Body mass index is 23.44 kg/m².        Physical Exam  Awake, Alert and Oriented. NAD, E4V5M6  Pupils equal, round & brisk. EOMI, no proptosis  Vision grossly intact, Hearing grossly intact   Ears   AD: no cerumen in EAC. TM non-erythematous, non-bulging. PET in place and patent. No middle ear effusion   AS: minimal cerumen. PET extruded into proximal EAC with minimal surrounding purulence. Small TM perforation in the anterior-inferior quadrant  Face symmetric, non-edematous, SILT  Tongue midline on extension, non-edematous, soft  Neck is symmetric, no masses, no LAD  Normal work of breathing, no stridor  Good phonation    Flexible Fiberoptic Nasal endoscopy  Date: 04/06/2019   Indication: Exacerbation of chronic rhinosinusitis    Consent: Verbal consent obtained.   Surgeon: Miguel Barboza  Anesthesia: topical lidocaine with afrin  Procedure: Scope inserted into nares, through nasal passage and nasopharynx with adequate visualization of larynx during phonation and repose.  Findings:   Significant thick green crusting throughout bilateral nasal cavities. Minimal mucopurulence at left maxillary antrostomy and nasopharynx. Septal mucosa dry.     Significant Labs:  CBC:   Recent Labs   Lab 04/06/19  0630   WBC 6.93   RBC 3.05*   HGB 7.8*   HCT 27.4*   *   MCV 90   MCH 25.6*   MCHC 28.5*     CMP:   Recent Labs   Lab 04/06/19  0630   *   CALCIUM 8.6*   ALBUMIN 3.0*   PROT 6.6      K 3.8   CO2 26      BUN 10   CREATININE 0.8   ALKPHOS 181*   ALT 26   AST 20   BILITOT 0.3       Significant Diagnostics:     NECT Sinuses 1/23/19    -Radiology read:    Postsurgical change prior functional endoscopic sinus  surgery.  There has been bilateral medial antrostomy with uncinectomy and middle turbinectomy as well as partial ethmoidectomy bilaterally.  The antrostomies are patent.  Despite prior surgery there is near complete opacification of the maxillary sinuses with circumferential mucoperiosteal thickening.  High density centrally suggesting inspissated secretions or fungal elements.  There is near complete opacification of the sphenoid sinuses and anterior ethmoid air cells.  Frontal sinuses are hypoplastic. Subtle leftward nasal septal deviation and spurring.  Otherwise the nasal cavity is unremarkable. Limited intracranial evaluation is unremarkable.  Extracranial soft tissue structures appear within normal limits.

## 2019-04-06 NOTE — SUBJECTIVE & OBJECTIVE
Subjective:     Interval History: No acute events overnight. Patient reports ongoing cough, wheezing and chest/sinus congestion. Cough now dry in nature. She also reports right sided pleuritic chest pain unchanged from admission.     Continuous Infusions:  Scheduled Meds:   calcium-vitamin D3  1 tablet Oral BID WM    ceFEPime (MAXIPIME) IVPB  2 g Intravenous Q8H    dapsone  100 mg Oral Daily    enoxaparin  40 mg Subcutaneous Daily    fluticasone  2 spray Each Nare Daily    fluticasone-vilanterol  1 puff Inhalation Daily    folic acid  1,000 mcg Oral Daily    gabapentin  300 mg Oral TID    levalbuterol  1.25 mg Nebulization TID    lipase-protease-amylase 12,000-38,000-60,000 units  4 capsule Oral TID WM    magnesium oxide  400 mg Oral BID    montelukast  10 mg Oral Daily    morphine  15 mg Oral BID    multivit-iron-FA-calcium-mins  1 tablet Oral BID    polyethylene glycol  17 g Oral BID    predniSONE  5 mg Oral Daily    QUEtiapine  25 mg Oral Daily    QUEtiapine  50 mg Oral QHS    tacrolimus  2.5 mg Oral BID    topiramate  25 mg Oral BID    vancomycin (VANCOCIN) IVPB  1,000 mg Intravenous Q12H    venlafaxine  150 mg Oral QHS    venlafaxine  37.5 mg Oral QHS     PRN Meds:acetaminophen, albuterol-ipratropium, butalbital-acetaminophen-caffeine -40 mg, diphenhydrAMINE, diphenhydrAMINE, LORazepam, magnesium sulfate IVPB **AND** magnesium sulfate IVPB, ondansetron, oxyCODONE, potassium chloride 10% **AND** potassium chloride 10% **AND** potassium chloride 10%, promethazine, tiZANidine    Review of patient's allergies indicates:   Allergen Reactions    Albuterol Palpitations    Colistin Anaphylaxis    Vancomycin analogues      Infusion reaction that does not resolve with slowing    Neupogen [filgrastim] Other (See Comments)     Ostealgia after five daily doses of 300 mcg.      Bactrim [sulfamethoxazole-trimethoprim] Hives    Ceftazidime Hives     Pt stated can tolerate cefapine not  ceftazidime    Ceftazidime     Dronabinol Other (See Comments)     Mental changes/hallucinations    Haldol [haloperidol lactate] Other (See Comments)     Seizure like activity    Nsaids (non-steroidal anti-inflammatory drug)      Cannot have due to lung transplant    Adhesive Rash     Cloth tape- please use tegaderm or paper tape    Aztreonam Rash    Ciprofloxacin Nausea And Vomiting     Projectile N/V, per patient.  Unwilling to retry therapy.       Review of Systems   Constitutional: Positive for activity change, appetite change and fatigue. Negative for chills and fever.   HENT: Positive for congestion, rhinorrhea, sinus pain and tinnitus. Negative for ear discharge, ear pain and sore throat.    Eyes: Negative for discharge and redness.   Respiratory: Positive for cough, shortness of breath and wheezing. Negative for chest tightness.    Cardiovascular: Positive for chest pain. Negative for palpitations and leg swelling.   Gastrointestinal: Positive for nausea. Negative for abdominal pain, constipation, diarrhea and vomiting.   Endocrine: Negative for polydipsia and polyuria.   Genitourinary: Negative for difficulty urinating, dysuria, frequency and urgency.   Musculoskeletal: Positive for myalgias. Negative for arthralgias, neck pain and neck stiffness.   Skin: Positive for pallor. Negative for rash.   Allergic/Immunologic: Positive for immunocompromised state.   Neurological: Positive for headaches. Negative for dizziness, weakness, light-headedness and numbness.   Hematological: Negative for adenopathy. Does not bruise/bleed easily.   Psychiatric/Behavioral: Negative for confusion, decreased concentration and hallucinations.     Objective:   Physical Exam   Constitutional: She is oriented to person, place, and time. She appears well-developed and well-nourished. She has a sickly appearance. No distress. Nasal cannula in place.   HENT:   Head: Normocephalic and atraumatic.   Right Ear: External ear  normal.   Left Ear: External ear normal.   Nose: Nose normal.   Mouth/Throat: Oropharynx is clear and moist. No oropharyngeal exudate.   Eyes: Conjunctivae and EOM are normal. Right eye exhibits no discharge. Left eye exhibits no discharge.   Neck: Normal range of motion. Neck supple. No JVD present.   Cardiovascular: Regular rhythm. Tachycardia present. Exam reveals no friction rub.   No murmur heard.  Pulmonary/Chest: Tachypnea noted. She has no decreased breath sounds. She has wheezes in the right upper field and the left upper field. She has rhonchi. She has no rales.   Abdominal: Soft. Bowel sounds are normal. She exhibits distension (minimal). There is no tenderness.   Musculoskeletal: Normal range of motion. She exhibits no edema.   Lymphadenopathy:     She has no cervical adenopathy.   Neurological: She is alert and oriented to person, place, and time. No cranial nerve deficit.   Skin: Skin is warm and dry. Capillary refill takes less than 2 seconds. She is not diaphoretic. There is pallor.   Psychiatric: She has a normal mood and affect. Her behavior is normal. Judgment and thought content normal.   Nursing note and vitals reviewed.        Vital Signs (Most Recent):  Temp: 98.3 °F (36.8 °C) (04/06/19 1209)  Pulse: 91 (04/06/19 1337)  Resp: 17 (04/06/19 1337)  BP: 109/66 (04/06/19 1209)  SpO2: 97 % (04/06/19 1337) Vital Signs (24h Range):  Temp:  [98.1 °F (36.7 °C)-98.7 °F (37.1 °C)] 98.3 °F (36.8 °C)  Pulse:  [77-97] 91  Resp:  [15-22] 17  SpO2:  [94 %-98 %] 97 %  BP: (107-129)/(66-81) 109/66     Weight: 54.4 kg (120 lb)  Body mass index is 23.44 kg/m².      Intake/Output Summary (Last 24 hours) at 4/6/2019 1620  Last data filed at 4/6/2019 0559  Gross per 24 hour   Intake 1280 ml   Output --   Net 1280 ml       Significant Labs:  CBC:  Recent Labs   Lab 04/06/19  0630   WBC 6.93   RBC 3.05*   HGB 7.8*   HCT 27.4*   *   MCV 90   MCH 25.6*   MCHC 28.5*     BMP:  Recent Labs   Lab 04/06/19  0630   NA  136   K 3.8      CO2 26   BUN 10   CREATININE 0.8   CALCIUM 8.6*      Tacrolimus Levels:  Recent Labs   Lab 04/06/19  0630   TACROLIMUS 9.0     Microbiology:  Microbiology Results (last 7 days)     Procedure Component Value Units Date/Time    Blood culture x two cultures. Draw prior to antibiotics. [[id number]] Collected:  04/05/19 1429    Order Status:  Completed Specimen:  Blood from Line, Subclavian, Right Updated:  04/06/19 1612     Blood Culture, Routine No Growth to date     Blood Culture, Routine No Growth to date    Narrative:       Aerobic and anaerobic    Blood culture x two cultures. Draw prior to antibiotics. [[id number]] Collected:  04/05/19 1400    Order Status:  Completed Specimen:  Blood from Line, Subclavian, Right Updated:  04/06/19 1612     Blood Culture, Routine No Growth to date     Blood Culture, Routine No Growth to date    Narrative:       Aerobic and anaerobic    Culture, Respiratory with Gram Stain [[id number]] Collected:  04/05/19 1429    Order Status:  Completed Specimen:  Respiratory from Sputum Updated:  04/06/19 0739     Respiratory Culture Insufficient incubation, culture in progress     Gram Stain (Respiratory) <10 epithelial cells per low power field.     Gram Stain (Respiratory) Many WBC's     Gram Stain (Respiratory) Many Gram positive cocci     Gram Stain (Respiratory) Rare Gram positive rods     Gram Stain (Respiratory) Rare yeast    Influenza A & B by Molecular [[id number]] Collected:  04/05/19 1429    Order Status:  Completed Specimen:  Nasopharyngeal Swab Updated:  04/05/19 1521     Influenza A, Molecular Negative     Influenza B, Molecular Negative     Flu A & B Source Nasal swab    Respiratory Viral Panel by PCR [facility]; Nasal Swab [[id number]] Collected:  04/05/19 1429    Order Status:  Sent Specimen:  Respiratory Updated:  04/05/19 1451          I have reviewed all pertinent labs within the past 24 hours.    Diagnostic Results:  Labs: Reviewed  X-Ray: Reviewed

## 2019-04-06 NOTE — ASSESSMENT & PLAN NOTE
Continue tacrolimus and prednisone. Currently off MMF secondary to recurrent infections. Will monitor daily tacrolimus levels and dose adjust accordingly.

## 2019-04-06 NOTE — ASSESSMENT & PLAN NOTE
Continue Flonase and Singulair. Followed by Dr. Olsen outpatient. ENT consulted, appreciate recs.

## 2019-04-06 NOTE — HPI
29F c chronic pansinusitis secondary to cystic fibrosis presents to ENT for evaluation of acute worsening of sinus symptoms in setting of suspected acute lower respiratory tract infection.     Over the past week, she reports progressively worsening thick green mucus from nose, post-nasal drainage, pain over her sinuses, productive cough, dyspnea and bilateral ear pain. Her home O2 requirements have increased.     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      S/p endoscopic sinus surgery x3 most recently 2017. S/p bilateral tympanostomy tubes in 2017.     Seen in ENT clinic 3 months ago. Sinus swab grew pseudomonas. She was treated with cefepime with improvement in symptoms.     She is admitted to the lung transplant team for suspected LRTI. Currently on IV cefepime and vancomycin.

## 2019-04-06 NOTE — H&P
"[facility]  Lung Transplant  H&P    Patient Name: Juanita Ibarra  MRN: [id number]  Admission Date: 4/5/2019  Attending Physician: Julio C Foster DO  Primary Care Provider: Primary Doctor No     Subjective:     History of Present Illness:  Mrs. 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 Patient presents to the ED today due to worsening complaints of chest congestion, dyspnea, and sinus congestion. Patient reports completion of 14 day course of IV cefepime around 3/19/2019 and initially noted symptomatic improvement with stable oxygenation following treatment for approximately one week. However, over the past week patient noticed productive cough with brown to yellow sputum production, increasing oxygen requirements to 3L via NC, and pleuritic chest pain triggered by tussive episodes. Patient reports that today her cough as been more dry and states that sputum production is not currently possible secondary to pleuritic chest pain. Patient also endorses complaints of green rhinorrhea, worsening sinus congestion, post-nasal drainage, and bilateral "ringing" /tinnitus in her ears. Patient endorses sinus headaches, increasing malaise/fatigue, generalized myalgias, increasing nausea, and poor PO intake. Denies any abdominal pain, diarrhea, constipation. Denies any sore throat, ear discharge. She reports being informed by her PCP that her tympanostomy tubes appear to be "falling out". Patient reports last ENT visit in January with Dr. Olsen.     In ED, WBC and procalcitonin were WNL; however, patient was noted to be febrile with Tmax of 100.3 and tachycardia to low 100s. Patient with tachypnea and O2 sats of 94% on 4L via NC. Of note, patient did have Coronavirus isolated from RVP on 2/26/2019. Will plan for admission to begin IV cefepime and vancomycin to monitor patient's " status.     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       Past Surgical History:   Procedure Laterality Date    ABDOMINAL SURGERY      peg tube     FSYQWLXA-BAIROXDIGRS-[id number] N/A 5/19/2015    Performed by Deny Dias Jr[facility] at [facility] OR    BIOPSY-BRONCHUS N/A 12/20/2016    Performed by Jake Alvarez MD at [facility] OR [facility]    BRONCHOSCOPY Bilateral 12/11/2018    Performed by Francisca Morin DO at [facility] OR [facility]    BRONCHOSCOPY N/A 10/1/2018    Performed by Jake [facility]varez MD at [facility] OR [facility]    BRONCHOSCOPY Bilateral 12/20/2016    Performed by Jake [facility]varez MD at [facility] OR [facility]    BRONCHOSCOPY - flexible bronchoscopy with probable tissue biopsy CPT 23642 N/A 7/21/2015    Performed by Jake Alvarez MD at [facility] OR [facility]    BRONCHOSCOPY - flexible bronchoscopy with probable tissue biospy CPT 24656 N/A 10/20/2015    Performed by Jake Alvarez MD at [facility] OR [facility]    BRONCHOSCOPY - flexible bronchoscopy with tissue biopsy CPT 96957 N/A 9/29/2015    Performed by Jake Alvarez MD at [facility] OR [facility]    BRONCHOSCOPY - flexible bronchoscopy with tissue biopsy CPT 12237 N/A 5/26/2015    Performed by Jake Alvarez MD at [facility] OR [facility]    BRONCHOSCOPY flexible bronchoscopy with tissue biopsy N/A 9/8/2014    Performed by Jake Alvarez MD at [facility] OR [facility]    BRONCHOSCOPY flexible with possible tissue biopsy N/A 8/8/2014    Performed by Jake Alvarez MD at [facility] OR [facility]    BRONCHOSCOPY, BAL, BIOPSIES N/A 3/20/2018    Performed by Jake Alvarez  MD at [facility] OR [address] [place] [address]    BRONCHOSCOPY-FIBEROPTIC N/A 1/29/2016    Performed by Joann Cifuentes DO at [facility] OR [address] [place] [address]    BRONCHOSCOPY; Bronchoscopy with BAL and transbronchial biopsies under general anesthesia N/A 5/29/2018    Performed by Jake Alvarez MD at [facility] OR [address] [place] [address]    CHOLECYSTECTOMY  2016    CHOLECYSTECTOMY-LAPAROSCOPIC N/A 7/22/2016    Performed by Joshua Goldberg, MD at [facility] OR [address] [place] [address]    ENDOMETRIAL ABLATION  2015    KJB    FESS      FESS Bilateral 10/21/2013    Performed by Jared Whatley MD at [facility] OR [address] [place] [address]    FINE NEEDLE ASPIRATION (FNA)  of a cervical lymphadenopathy  1/29/2016    Performed by Joann Cifuentes DO at [facility] OR [address] [place] [address]    flex bronchoscopy with probable tissue biopsy N/A 7/31/2014    Performed by [facility] Provider at [facility] OR [address] [place] [address]    flexible bronchoscopy with tissue biopsy N/A 12/11/2014    Performed by Jake Alvarez MD at [facility] OR [address] [place] [address]    HYSTERECTOMY  04/2017    [facility]    INJECTION-VOCAL CORD Left 6/24/2014    Performed by Jared Whatley MD at [facility] OR [address] [place] [address]    GVZJMMJCI-SAOM-X-CATH to right chest and removal of portacath to left chests wall. Bilateral 6/24/2014    Performed by Zhen Dorado MD at [facility] OR [address] [place] [address]    LARYNX SURGERY  2016    LUNG TRANSPLANT Bilateral 6/12/14    MYRINGOTOMY W/ TUBES Right 04/2017    MYRINGOTOMY WITH INSERTION OF PE TUBES Right 4/15/2017    [facility]    PLACEMENT-TUBE-PEG  5/15/2014    [facility]    PORTACATH PLACEMENT Right     rt sc    REMOVAL-TUBE-PEG  5/15/2014    Performed by David Moses MD at [facility] OR [address] [place] [address]    RHC for lung re-transplant N/A 1/22/2019    Performed by Laura Rachel MD at [facility] CATH LAB    ROBOTIC ASSISTED LAPAROSCOPIC HYSTERECTOMY N/A 4/25/2017    Performed by Deny Dias Jr[facility] MD at [facility] OR    SALPINGECTOMY Bilateral 2015    [facility]    SALPINGECTOMY-LAPAROSCOPIC Bilateral  5/19/2015    Performed by [facility] Dias [facility] MD at [facility] OR    SINUS SURGERY FUNCTIONAL ENDOSCOPIC WITH NAVIGATION Bilateral 4/3/2017    [facility]    SINUS SURGERY FUNCTIONAL ENDOSCOPIC WITH NAVIGATION, 93979, 00789, 02106, 37651 Bilateral 2/5/2015    [facility]    THYROPLASTY - Medialization laryngoplasty, cricothyroid subluxation, arytenoid repositioning Left 8/2/2016    Performed by Gary Null MD at [facility] OR [facility]    TRANSPLANT-LUNG Bilateral 6/11/2014    Performed by Adolfo Huston MD at [facility] OR [address] [place] [address]       Review of patient's allergies indicates:   Allergen Reactions    Albuterol Palpitations    Colistin Anaphylaxis    Vancomycin analogues      Infusion reaction that does not resolve with slowing    Neupogen [filgrastim] Other (See Comments)     Ostealgia after five daily doses of 300 mcg.      Bactrim [sulfamethoxazole-trimethoprim] Hives    Ceftazidime Hives     Pt stated can tolerate cefapine not ceftazidime    Ceftazidime     Dronabinol Other (See Comments)     Mental changes/hallucinations    Haldol [haloperidol lactate] Other (See Comments)     Seizure like activity    Nsaids (non-steroidal anti-inflammatory drug)      Cannot have due to lung transplant    Adhesive Rash     Cloth tape- please use tegaderm or paper tape    Aztreonam Rash    Ciprofloxacin Nausea And Vomiting     Projectile N/V, per patient.  Unwilling to retry therapy.         (Not in a hospital admission)  Family History     Problem Relation (Age of Onset)    Diabetes Maternal Grandfather    Drug abuse Maternal Grandfather    Hypertension Maternal Grandmother    Thrombocytopenia Maternal Grandfather        Tobacco Use    Smoking status: Never Smoker    Smokeless tobacco: Never Used   Substance and Sexual Activity    Alcohol use: No     Comment: Has not had an alcoholic drink in more than 2 months.    Drug use: No     Sexual activity: Yes     Partners: Male     Birth control/protection: See Surgical Hx     Comment: current partner since Oct 2016     Review of Systems   Constitutional: Positive for activity change, appetite change, fatigue and fever. Negative for chills.   HENT: Positive for congestion, rhinorrhea, sinus pain and tinnitus. Negative for ear discharge, ear pain and sore throat.    Eyes: Negative for discharge and redness.   Respiratory: Positive for cough, shortness of breath and wheezing. Negative for chest tightness.    Cardiovascular: Positive for chest pain. Negative for palpitations and leg swelling.   Gastrointestinal: Positive for nausea. Negative for abdominal pain, constipation, diarrhea and vomiting.   Endocrine: Negative for polydipsia and polyuria.   Genitourinary: Negative for difficulty urinating, dysuria, frequency and urgency.   Musculoskeletal: Positive for myalgias. Negative for arthralgias, neck pain and neck stiffness.   Skin: Positive for pallor. Negative for rash.   Allergic/Immunologic: Positive for immunocompromised state.   Neurological: Positive for headaches. Negative for dizziness, weakness, light-headedness and numbness.   Hematological: Negative for adenopathy. Does not bruise/bleed easily.   Psychiatric/Behavioral: Negative for confusion, decreased concentration and hallucinations.     Objective:     Vital Signs (Most Recent):  Temp: 100.3 °F (37.9 °C) (04/05/19 1214)  Pulse: 82 (04/05/19 1605)  Resp: (!) 22 (04/05/19 1605)  BP: 115/72 (04/05/19 1605)  SpO2: (!) 94 % (04/05/19 1605) Vital Signs (24h Range):  Temp:  [100.3 °F (37.9 °C)] 100.3 °F (37.9 °C)  Pulse:  [] 82  Resp:  [22-34] 22  SpO2:  [94 %-97 %] 94 %  BP: (104-119)/(71-83) 115/72     Weight: 54.4 kg (120 lb)  Body mass index is 23.44 kg/m².    No intake or output data in the 24 hours ending 04/05/19 1923    Physical Exam   Constitutional: She is oriented to person, place, and time. She appears well-developed and  well-nourished. She has a sickly appearance. No distress. Nasal cannula in place.   HENT:   Head: Normocephalic and atraumatic.   Right Ear: External ear normal.   Left Ear: External ear normal.   Nose: Nose normal.   Mouth/Throat: Oropharynx is clear and moist. No oropharyngeal exudate.   Eyes: Conjunctivae and EOM are normal. Right eye exhibits no discharge. Left eye exhibits no discharge.   Neck: Normal range of motion. Neck supple. No JVD present.   Cardiovascular: Regular rhythm. Tachycardia present. Exam reveals no friction rub.   No murmur heard.  Pulmonary/Chest: Tachypnea noted. She has no decreased breath sounds. She has wheezes in the right upper field and the left upper field. She has rhonchi. She has no rales.   Abdominal: Soft. Bowel sounds are normal. She exhibits distension (minimal). There is no tenderness.   Musculoskeletal: Normal range of motion. She exhibits no edema.   Lymphadenopathy:     She has no cervical adenopathy.   Neurological: She is alert and oriented to person, place, and time. No cranial nerve deficit.   Skin: Skin is warm and dry. Capillary refill takes less than 2 seconds. She is not diaphoretic. There is pallor.   Psychiatric: She has a normal mood and affect. Her behavior is normal. Judgment and thought content normal.   Nursing note and vitals reviewed.      Significant Labs:  CBC:  Recent Labs   Lab 04/05/19  1400   WBC 9.75   RBC 3.49*   HGB 9.0*   HCT 30.9*   *   MCV 89   MCH 25.8*   MCHC 29.1*     BMP:  Recent Labs   Lab 04/05/19  1400      K 3.9      CO2 25   BUN 14   CREATININE 0.9   CALCIUM 8.7        I have reviewed all pertinent labs within the past 24 hours.    Diagnostic Results:  Labs: Reviewed  X-Ray: Reviewed    Assessment/Plan:     * Lower respiratory tract infection  Respiratory culture from sputum and RVP pending. Blood cultures with NGTD.  Influenza testing negative. Procalcitonin negative with no leukocytosis. CXR overall stable; however,  would consider that right lung does appear to have slight increased areas of opacity than previous imaging. Will plan for cefepime and vancomycin based on previous sensitivities. Continue CPT and scheduled nebs TID.     Lung replaced by transplant  Patient s/p BLT secondary to CF in 2014 with known CARLOS EDUARDO and currently undergoing workup for re-transplantation. Will plan to continue current immunosuppression and prophylaxis. Will follow up on pending items of lung transplant workup while inpatient.     Immunosuppression  Continue tacrolimus and prednisone. Currently off MMF secondary to recurrent infections. Will monitor daily tacrolimus levels and dose adjust accordingly.     Prophylactic antibiotic  Continue dapsone.     Sinusitis, chronic  Will consider ENT consult while inpatient as sinusitis is likely the source of her recurrent pulmonary infections. Continue Flonase and Singulair.     Chronic pain with opiate use  Continue current home regimen while inpatient. Currently following with outside pain management.     CF related Pancreatic insufficiency  Continue Creon supplementation with meals and snacks.         Jacqueline Dumont PA-C  Lung Transplant  [facility]

## 2019-04-06 NOTE — CONSULTS
Consult acknowledged. Patient seen and examined at 1630 this afternoon. Will admit to lung transplant service. Please see full H&P to follow.

## 2019-04-06 NOTE — PLAN OF CARE
Problem: Adult Inpatient Plan of Care  Goal: Plan of Care Review  Outcome: Ongoing (interventions implemented as appropriate)  Pt AAOx4, independent, VSS, afebrile. C/o pain, nausea, and anxiety today. Continues on IV abx. Pt with course, frequent cough, productive, green tinged sputum. On oxygen, 4 L NC, sats >94%. Bed in lowest position, wheels locked, call light in reach,  at bedside.

## 2019-04-07 NOTE — CONSULTS
[facility]  Otorhinolaryngology-Head & Neck Surgery  Consult Note    Patient Name: Juanita Ibarra  MRN: [id number]  Code Status: Full Code  Admission Date: 4/5/2019  Hospital Length of Stay: 1 days  Attending Physician: Wiliam Mae MD  Primary Care Provider: Primary Doctor No    Patient information was obtained from patient, past medical records and ER records.     Inpatient consult to ENT  Consult performed by: Miguel Barboza MD  Consult ordered by: Shama Canlaes PA-C        Subjective:     Chief Complaint/Reason for Admission: respiratory tract infection, immunosuppresion    History of Present Illness: 29F c chronic pansinusitis secondary to cystic fibrosis presents to ENT for evaluation of acute worsening of sinus symptoms in setting of suspected acute lower respiratory tract infection.     Over the past week, she reports progressively worsening thick green mucus from nose, post-nasal drainage, pain over her sinuses, productive cough, dyspnea and bilateral ear pain. Her home O2 requirements have increased.     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      S/p endoscopic sinus surgery x3 most recently 2017. S/p bilateral tympanostomy tubes in 2017.     Seen in ENT clinic 3 months ago. Sinus swab grew pseudomonas. She was treated with cefepime with improvement in symptoms.     She is admitted to the lung transplant team for suspected LRTI. Currently on IV cefepime and vancomycin.     Medications:  Continuous Infusions:  Scheduled Meds:   calcium-vitamin D3  1 tablet Oral BID WM    ceFEPime (MAXIPIME) IVPB  2 g Intravenous Q8H    dapsone  100 mg Oral Daily    enoxaparin  40 mg Subcutaneous Daily    fluticasone  2 spray Each Nare Daily    fluticasone-vilanterol  1 puff Inhalation Daily    folic acid  1,000 mcg Oral Daily    gabapentin  300 mg Oral TID    levalbuterol  1.25 mg Nebulization TID     lipase-protease-amylase 12,000-38,000-60,000 units  4 capsule Oral TID WM    magnesium oxide  400 mg Oral BID    montelukast  10 mg Oral Daily    morphine  15 mg Oral BID    multivit-iron-FA-calcium-mins  1 tablet Oral BID    polyethylene glycol  17 g Oral BID    predniSONE  5 mg Oral Daily    QUEtiapine  25 mg Oral Daily    QUEtiapine  50 mg Oral QHS    tacrolimus  2.5 mg Oral BID    topiramate  25 mg Oral BID    vancomycin (VANCOCIN) IVPB  1,000 mg Intravenous Q12H    venlafaxine  150 mg Oral QHS    venlafaxine  37.5 mg Oral QHS     PRN Meds:acetaminophen, albuterol-ipratropium, butalbital-acetaminophen-caffeine -40 mg, diphenhydrAMINE, diphenhydrAMINE, LORazepam, magnesium sulfate IVPB **AND** magnesium sulfate IVPB, ondansetron, oxyCODONE, potassium chloride 10% **AND** potassium chloride 10% **AND** potassium chloride 10%, promethazine, tiZANidine     No current facility-administered medications on file prior to encounter.      Current Outpatient Medications on File Prior to Encounter   Medication Sig    acetaminophen (TYLENOL) 500 MG tablet Take 1,000 mg by mouth every 6 (six) hours as needed for Pain.    amLODIPine (NORVASC) 10 MG tablet Take 1 tablet (10 mg total) by mouth once daily.    butalbital-acetaminophen-caffeine -40 mg (FIORICET, ESGIC) -40 mg per tablet Take 1 tablet by mouth every 6 (six) hours as needed for Pain.    calcium-vitamin D3 (OS-KATY 500 + D3) 500 mg(1,250mg) -200 unit per tablet Take 1 tablet by mouth 2 (two) times daily with meals.    dapsone 100 MG Tab Take 1 tablet (100 mg total) by mouth once daily.    diphenhydrAMINE (BENADRYL) 50 MG tablet Take 1 tablet (50 mg total) by mouth every 6 (six) hours as needed for Itching.    DULERA 100-5 mcg/actuation HFAA INHALE 1 PUFF BY MOUTH TWICE DAILY    fexofenadine (ALLEGRA) 180 MG tablet Take 180 mg by mouth once daily.    fluticasone (FLONASE) 50 mcg/actuation nasal spray INSERT 2 SPRAYS IN EACH  NOSTRIL DAILY    folic acid (FOLVITE) 1 MG tablet Take 1 tablet (1,000 mcg total) by mouth once daily.    gabapentin (NEURONTIN) 300 MG capsule Take 1 capsule (300 mg total) by mouth 3 (three) times daily.    levalbuterol (XOPENEX) 1.25 mg/3 mL nebulizer solution Take 3 mLs (1.25 mg total) by nebulization every 8 (eight) hours as needed for Wheezing or Shortness of Breath. Rescue    lipase-protease-amylase (CREON) 36,000-114,000- 180,000 unit CpDR Take 4 capsules by mouth 3 (three) times daily with meals. Take 3 with snacks prn. (Patient taking differently: Take 5 capsules by mouth 3 (three) times daily with meals. Take 3 with snacks prn.)    LORazepam (ATIVAN) 2 MG Tab Take 1 tablet by mouth every 8 hours as needed for ANXIETY    magnesium oxide (MAG-OX) 400 mg (241.3 mg magnesium) tablet Take 1 tablet (400 mg total) by mouth 2 (two) times daily.    metoprolol tartrate (LOPRESSOR) 25 MG tablet Take 1 tablet (25 mg total) by mouth 2 (two) times daily.    montelukast (SINGULAIR) 10 mg tablet Take 1 tablet (10 mg total) by mouth once daily.    morphine (MS CONTIN) 15 MG 12 hr tablet Take 15 mg by mouth 2 (two) times daily.     mycophenolate (CELLCEPT) 250 mg Cap Take 2 capsules (500 mg total) by mouth 2 (two) times daily.    omeprazole (PRILOSEC) 40 MG capsule Take 1 capsule by mouth once daily    ondansetron (ZOFRAN) 8 MG tablet Take 1 tablet (8 mg total) by mouth every 12 (twelve) hours as needed for Nausea.    oxycodone (ROXICODONE) 5 MG immediate release tablet Take 10 mg by mouth every 4 (four) hours as needed for Pain.    predniSONE (DELTASONE) 5 MG tablet Take 1 tablet (5 mg total) by mouth once daily.    promethazine (PHENERGAN) 25 MG tablet Take 1 tablet (25 mg total) by mouth every 6 (six) hours as needed for Nausea.    QUEtiapine (SEROQUEL) 25 MG Tab Take 1 tablet by mouth in morning and 2 tablets at bedtime    tacrolimus (PROGRAF) 0.5 MG Cap Take 1 capsule (0.5 mg total) by mouth every 12  (twelve) hours.    tacrolimus (PROGRAF) 1 MG Cap Take 2 capsules (2 mg total) by mouth every 12 (twelve) hours. Daily doses: 2.5 mg twice daily.    tiZANidine (ZANAFLEX) 4 MG tablet TAKE 2 TABLETS BY MOUTH EVERY 6 HOURS AS NEEDED    topiramate (TOPAMAX) 25 MG tablet Take 1 tablet (25 mg total) by mouth 2 (two) times daily.    venlafaxine (EFFEXOR-XR) 150 MG Cp24 Take 1 capsule (150 mg total) by mouth once daily.    venlafaxine (EFFEXOR-XR) 37.5 MG 24 hr capsule Take 1 capsule (37.5 mg total) by mouth once daily.    clindamycin (CLEOCIN) 300 MG capsule Take 2 capsules (600 mg total) by mouth every 6 (six) hours.    diphenhydrAMINE (BENADRYL) 50 MG capsule Take 50 mg by mouth.    fluconazole (DIFLUCAN) 150 MG Tab TAKE 1 TABLET BY MOUTH EVERY WEEK    inhalation spacing device (Augure) USE AS DIRECTED    levalbuterol (XOPENEX HFA) 45 mcg/actuation inhaler Inhale 1-2 puffs into the lungs.    multivit,min52-folic-vitK-cQ10 (AQUADEKS) 100-700-10 mcg-mcg-mg Cap cap 1 tab twice daily    multivitamin-minerals no.55 (CENTRUM FLAVOR BURST ADULT) Chew     omeprazole (PRILOSEC) 40 MG capsule Take 40 mg by mouth 2 (two) times daily.    polyethylene glycol (GLYCOLAX) 17 gram PwPk Take 17 g by mouth 2 (two) times daily.       Review of patient's allergies indicates:   Allergen Reactions    Albuterol Palpitations    Colistin Anaphylaxis    Vancomycin analogues      Infusion reaction that does not resolve with slowing    Neupogen [filgrastim] Other (See Comments)     Ostealgia after five daily doses of 300 mcg.      Bactrim [sulfamethoxazole-trimethoprim] Hives    Ceftazidime Hives     Pt stated can tolerate cefapine not ceftazidime    Ceftazidime     Dronabinol Other (See Comments)     Mental changes/hallucinations    Haldol [haloperidol lactate] Other (See Comments)     Seizure like activity    Nsaids (non-steroidal anti-inflammatory drug)      Cannot have due to lung transplant    Adhesive Rash      Cloth tape- please use tegaderm or paper tape    Aztreonam Rash    Ciprofloxacin Nausea And Vomiting     Projectile N/V, per patient.  Unwilling to retry therapy.       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     Past Surgical History:   Procedure Laterality Date    ABDOMINAL SURGERY      peg tube     KEKONGVF-OSEKGEOSEHS-AOAYYFZAZWQH N/A 5/19/2015    Performed [facility] OR    BIOPSY-BRONCHUS N/A 12/20/2016    Performed by Jake [facility]varez MD at [facility] OR 2ND [facility]    BRONCHOSCOPY Bilateral 12/11/2018    Performed by Francisca Morin DO at [facility] OR [facility]    BRONCHOSCOPY N/A 10/1/2018    Performed by Jake Alvarez MD at [facility] OR [facility]    BRONCHOSCOPY Bilateral 12/20/2016    Performed by Jake [facility]varez MD at [facility] OR [facility]    BRONCHOSCOPY - flexible bronchoscopy with probable tissue biopsy CPT 53498 N/A 7/21/2015    Performed by Jake [facility]varez MD at [facility] OR [facility]    BRONCHOSCOPY - flexible bronchoscopy with probable tissue biospy CPT 87292 N/A 10/20/2015    Performed by Jake [facility]varez MD at [facility] OR [facility]    BRONCHOSCOPY - flexible bronchoscopy with tissue biopsy CPT 64657 N/A 9/29/2015    Performed by Jake [facility]varez MD at [facility] OR [facility]    BRONCHOSCOPY - flexible bronchoscopy with tissue biopsy CPT 38473 N/A 5/26/2015    Performed by Jake Alvarez MD at [facility] OR [facility]    BRONCHOSCOPY flexible bronchoscopy with tissue biopsy N/A 9/8/2014    Performed by Jake Alvarez MD at [facility] OR [facility]    BRONCHOSCOPY flexible with possible tissue  biopsy N/A 8/8/2014    Performed by Jake [facility]varez MD at [facility] OR [address] [place] [address]    BRONCHOSCOPY, BAL, BIOPSIES N/A 3/20/2018    Performed by Jake Alvarez MD at [facility] OR [address] [place] [address]    BRONCHOSCOPY-FIBEROPTIC N/A 1/29/2016    [facility]    BRONCHOSCOPY; Bronchoscopy with BAL and transbronchial biopsies under general anesthesia N/A 5/29/2018    Performed by Jake Alvarez MD at [facility] OR [address] [place] [address]    CHOLECYSTECTOMY  2016    CHOLECYSTECTOMY-LAPAROSCOPIC N/A 7/22/2016    Performed by Joshua Goldberg, MD at [facility] OR [address] [place] [address]    ENDOMETRIAL ABLATION  2015    [facility]    FESS      FESS Bilateral 10/21/2013    Performed by Jared Whatley MD at [facility] OR [address] [place] [address]    FINE NEEDLE ASPIRATION (FNA)  of a cervical lymphadenopathy  1/29/2016    Performed by Joann Cifuentes DO at [facility] OR [address] [place] [address]    flex bronchoscopy with probable tissue biopsy N/A 7/31/2014    Performed by [facility] Diagnostic Provider at [facility] OR [address] [place] [address]    flexible bronchoscopy with tissue biopsy N/A 12/11/2014    Performed by Jake Alvarez MD at [facility] OR [address] [place] [address]    HYSTERECTOMY  04/2017    [facility]    INJECTION-VOCAL CORD Left 6/24/2014    Performed by Jared Whatley MD at [facility] OR [address] [place] [address]    OWTCOJYCB-UCNH-P-CATH to right chest and removal of portacath to left chests wall. Bilateral 6/24/2014    Performed by Zhen Dorado MD at [facility] OR [address] [place] [address]    LARYNX SURGERY  2016    LUNG TRANSPLANT Bilateral 6/12/14    MYRINGOTOMY W/ TUBES Right 04/2017    MYRINGOTOMY WITH INSERTION OF PE TUBES Right 4/15/2017    Performed by Gary Null MD at [facility] OR [facility]    PLACEMENT-TUBE-PEG  5/15/2014    [facility]    PORTACATH PLACEMENT Right     rt sc    REMOVAL-TUBE-PEG  5/15/2014    [facility]    RHC for lung re-transplant N/A 1/22/2019    Performed by Laura Rachel MD at [facility] CATH LAB    ROBOTIC ASSISTED LAPAROSCOPIC  HYSTERECTOMY N/A 4/25/2017    Performed by [facility]y [facility]as Jr[facility] at [facility] OR    SALPINGECTOMY Bilateral 2015    [facility]    SALPINGECTOMY-LAPAROSCOPIC Bilateral 5/19/2015    Performed by [facility]y Dias Jr[facility] MD at [facility] OR    SINUS SURGERY FUNCTIONAL ENDOSCOPIC WITH NAVIGATION Bilateral 4/3/2017    Performed by Cesar Olsen MD at [facility] OR 2ND FLR    SINUS SURGERY FUNCTIONAL ENDOSCOPIC WITH NAVIGATION, 31267, 81343, 23316, 83435 Bilateral 2/5/2015    [facility]    THYROPLASTY - Medialization laryngoplasty, cricothyroid subluxation, arytenoid repositioning Left 8/2/2016    [facility]    TRANSPLANT-LUNG Bilateral 6/11/2014    Performed by Adolfo Huston MD at [facility] OR [address] [place] [address]     Family History     Problem Relation (Age of Onset)    Diabetes Maternal Grandfather    Drug abuse Maternal Grandfather    Hypertension Maternal Grandmother    Thrombocytopenia Maternal Grandfather        Tobacco Use    Smoking status: Never Smoker    Smokeless tobacco: Never Used   Substance and Sexual Activity    Alcohol use: No     Comment: Has not had an alcoholic drink in more than 2 months.    Drug use: No    Sexual activity: Yes     Partners: Male     Birth control/protection: See Surgical Hx     Comment: current partner since Oct 2016     Review of Systems ENT-focused ROS completed and is negative unless otherwise stated in the HPI.     Objective:     Vital Signs (Most Recent):  Temp: 98.3 °F (36.8 °C) (04/06/19 1209)  Pulse: 91 (04/06/19 1337)  Resp: 17 (04/06/19 1337)  BP: 109/66 (04/06/19 1209)  SpO2: 97 % (04/06/19 1337) Vital Signs (24h Range):  Temp:  [98.1 °F (36.7 °C)-98.7 °F (37.1 °C)] 98.3 °F (36.8 °C)  Pulse:  [77-97] 91  Resp:  [15-22] 17  SpO2:  [94 %-98 %] 97 %  BP: (107-129)/(66-81) 109/66     Weight: 54.4 kg (120 lb)  Body mass index is 23.44 kg/m².        Physical Exam  Awake, Alert and Oriented. NAD, E4V5M6  Pupils equal,  round & brisk. EOMI, no proptosis  Vision grossly intact, Hearing grossly intact   Ears   AD: no cerumen in EAC. TM non-erythematous, non-bulging. PET in place and patent. No middle ear effusion   AS: minimal cerumen. PET extruded into proximal EAC with minimal surrounding purulence. Small TM perforation in the anterior-inferior quadrant  Face symmetric, non-edematous, SILT  Tongue midline on extension, non-edematous, soft  Neck is symmetric, no masses, no LAD  Normal work of breathing, no stridor  Good phonation    Flexible Fiberoptic Nasal endoscopy  Date: 04/06/2019   Indication: Exacerbation of chronic rhinosinusitis    Consent: Verbal consent obtained.   Surgeon: Miguel Barboza  Anesthesia: topical lidocaine with afrin  Procedure: Scope inserted into nares, through nasal passage and nasopharynx with adequate visualization of larynx during phonation and repose.  Findings:   Significant thick green crusting throughout bilateral nasal cavities. Minimal mucopurulence at left maxillary antrostomy and nasopharynx. Septal mucosa dry.     Significant Labs:  CBC:   Recent Labs   Lab 04/06/19  0630   WBC 6.93   RBC 3.05*   HGB 7.8*   HCT 27.4*   *   MCV 90   MCH 25.6*   MCHC 28.5*     CMP:   Recent Labs   Lab 04/06/19  0630   *   CALCIUM 8.6*   ALBUMIN 3.0*   PROT 6.6      K 3.8   CO2 26      BUN 10   CREATININE 0.8   ALKPHOS 181*   ALT 26   AST 20   BILITOT 0.3       Significant Diagnostics:     NECT Sinuses 1/23/19    -Radiology read:    Postsurgical change prior functional endoscopic sinus surgery.  There has been bilateral medial antrostomy with uncinectomy and middle turbinectomy as well as partial ethmoidectomy bilaterally.  The antrostomies are patent.  Despite prior surgery there is near complete opacification of the maxillary sinuses with circumferential mucoperiosteal thickening.  High density centrally suggesting inspissated secretions or fungal elements.  There is near complete  opacification of the sphenoid sinuses and anterior ethmoid air cells.  Frontal sinuses are hypoplastic. Subtle leftward nasal septal deviation and spurring.  Otherwise the nasal cavity is unremarkable. Limited intracranial evaluation is unremarkable.  Extracranial soft tissue structures appear within normal limits.    Assessment/Plan:     Acute otitis externa of left ear  Recommend floxin 5 gtts BID to left ear  Will attempt removal of extruded PET tomorrow at bedside per pt request     Sinusitis, chronic  29yoF presenting with acute exacerbation of chronic rhinosinusitis. Recent sinus cultures + for pseudomonas    Recommend:    - Nasal saline rinses BID  - Nasal saline spray TID  - Continue flonase  - Cefepime for pseudomonas coverage, vancomycin given +h/o MRSA       Will see patient 4/7 AM with Dr. Olsen. Please call with questions or concerns.       Miguel Barboza,   Otorhinolaryngology-Head & Neck Surgery  [facility]  04/06/2019

## 2019-04-07 NOTE — SUBJECTIVE & OBJECTIVE
Subjective:     Interval History: No acute events overnight. Cough more productive today. Continues to complain of right pleuritic chest pain and poor appetite. Patient more dyspneic with minimal activity.    Continuous Infusions:  Scheduled Meds:   calcium-vitamin D3  1 tablet Oral BID WM    ceFEPime (MAXIPIME) IVPB  2 g Intravenous Q8H    dapsone  100 mg Oral Daily    enoxaparin  40 mg Subcutaneous Daily    fluconazole  150 mg Oral Once    fluticasone  2 spray Each Nare Daily    fluticasone-vilanterol  1 puff Inhalation Daily    folic acid  1,000 mcg Oral Daily    gabapentin  300 mg Oral TID    levalbuterol  1.25 mg Nebulization TID    lipase-protease-amylase 12,000-38,000-60,000 units  4 capsule Oral TID WM    magnesium oxide  400 mg Oral BID    montelukast  10 mg Oral Daily    morphine  15 mg Oral BID    multivit-iron-FA-calcium-mins  1 tablet Oral BID    polyethylene glycol  17 g Oral BID    predniSONE  5 mg Oral Daily    QUEtiapine  25 mg Oral Daily    QUEtiapine  50 mg Oral QHS    tacrolimus  2.5 mg Oral BID    topiramate  25 mg Oral BID    vancomycin (VANCOCIN) IVPB  1,000 mg Intravenous Q12H    venlafaxine  150 mg Oral QHS    venlafaxine  37.5 mg Oral QHS     PRN Meds:acetaminophen, albuterol-ipratropium, butalbital-acetaminophen-caffeine -40 mg, diphenhydrAMINE, diphenhydrAMINE, LORazepam, magnesium sulfate IVPB **AND** magnesium sulfate IVPB, ondansetron, oxyCODONE, potassium chloride 10% **AND** potassium chloride 10% **AND** potassium chloride 10%, promethazine (PHENERGAN) IVPB, promethazine, tiZANidine    Review of patient's allergies indicates:   Allergen Reactions    Albuterol Palpitations    Colistin Anaphylaxis    Vancomycin analogues      Infusion reaction that does not resolve with slowing    Neupogen [filgrastim] Other (See Comments)     Ostealgia after five daily doses of 300 mcg.      Bactrim [sulfamethoxazole-trimethoprim] Hives    Ceftazidime Hives     Pt  stated can tolerate cefapine not ceftazidime    Ceftazidime     Dronabinol Other (See Comments)     Mental changes/hallucinations    Haldol [haloperidol lactate] Other (See Comments)     Seizure like activity    Nsaids (non-steroidal anti-inflammatory drug)      Cannot have due to lung transplant    Adhesive Rash     Cloth tape- please use tegaderm or paper tape    Aztreonam Rash    Ciprofloxacin Nausea And Vomiting     Projectile N/V, per patient.  Unwilling to retry therapy.       Review of Systems   Constitutional: Positive for activity change, appetite change and fatigue. Negative for chills and fever.   HENT: Positive for congestion, rhinorrhea, sinus pain and tinnitus. Negative for ear discharge, ear pain and sore throat.    Eyes: Negative for discharge and redness.   Respiratory: Positive for cough, shortness of breath and wheezing. Negative for chest tightness.    Cardiovascular: Positive for chest pain. Negative for palpitations and leg swelling.   Gastrointestinal: Positive for nausea. Negative for abdominal pain, constipation, diarrhea and vomiting.   Endocrine: Negative for polydipsia and polyuria.   Genitourinary: Negative for difficulty urinating, dysuria, frequency and urgency.   Musculoskeletal: Positive for myalgias. Negative for arthralgias, neck pain and neck stiffness.   Skin: Positive for pallor. Negative for rash.   Allergic/Immunologic: Positive for immunocompromised state.   Neurological: Positive for headaches. Negative for dizziness, weakness, light-headedness and numbness.   Hematological: Negative for adenopathy. Does not bruise/bleed easily.   Psychiatric/Behavioral: Negative for confusion, decreased concentration and hallucinations.     Objective:   Physical Exam   Constitutional: She is oriented to person, place, and time. She appears well-developed and well-nourished. She has a sickly appearance. No distress. Nasal cannula in place.   HENT:   Head: Normocephalic and atraumatic.    Right Ear: External ear normal.   Left Ear: External ear normal.   Nose: Nose normal.   Mouth/Throat: Oropharynx is clear and moist. No oropharyngeal exudate.   Eyes: Conjunctivae and EOM are normal. Right eye exhibits no discharge. Left eye exhibits no discharge.   Neck: Normal range of motion. Neck supple. No JVD present.   Cardiovascular: Regular rhythm. Tachycardia present. Exam reveals no friction rub.   No murmur heard.  Pulmonary/Chest: Tachypnea noted. She has no decreased breath sounds. She has wheezes in the right upper field and the left upper field. She has rhonchi. She has no rales.   Abdominal: Soft. Bowel sounds are normal. She exhibits distension (minimal). There is no tenderness.   Musculoskeletal: Normal range of motion. She exhibits no edema.   Lymphadenopathy:     She has no cervical adenopathy.   Neurological: She is alert and oriented to person, place, and time. No cranial nerve deficit.   Skin: Skin is warm and dry. Capillary refill takes less than 2 seconds. She is not diaphoretic. There is pallor.   Psychiatric: She has a normal mood and affect. Her behavior is normal. Judgment and thought content normal.   Nursing note and vitals reviewed.        Vital Signs (Most Recent):  Temp: 98.2 °F (36.8 °C) (04/07/19 0745)  Pulse: (!) 111 (04/07/19 0745)  Resp: 20 (04/07/19 0745)  BP: 126/71 (04/07/19 0745)  SpO2: 95 % (04/07/19 0745) Vital Signs (24h Range):  Temp:  [98.1 °F (36.7 °C)-98.3 °F (36.8 °C)] 98.2 °F (36.8 °C)  Pulse:  [] 111  Resp:  [17-21] 20  SpO2:  [93 %-98 %] 95 %  BP: (109-132)/(66-81) 126/71     Weight: 54.4 kg (120 lb)  Body mass index is 23.44 kg/m².    No intake or output data in the 24 hours ending 04/07/19 1039    Significant Labs:  CBC:  Recent Labs   Lab 04/07/19  0520   WBC 5.38   RBC 3.00*   HGB 7.8*   HCT 26.9*   *   MCV 90   MCH 26.0*   MCHC 29.0*     BMP:  Recent Labs   Lab 04/07/19  0930      K 3.5      CO2 27   BUN 11   CREATININE 0.8    CALCIUM 8.8      Tacrolimus Levels:  Recent Labs   Lab 04/07/19  0520   TACROLIMUS 5.3     Microbiology:  Microbiology Results (last 7 days)     Procedure Component Value Units Date/Time    Blood culture x two cultures. Draw prior to antibiotics. [[id number]] Collected:  04/05/19 1429    Order Status:  Completed Specimen:  Blood from Line, Subclavian, Right Updated:  04/06/19 1612     Blood Culture, Routine No Growth to date     Blood Culture, Routine No Growth to date    Narrative:       Aerobic and anaerobic    Blood culture x two cultures. Draw prior to antibiotics. [[id number]] Collected:  04/05/19 1400    Order Status:  Completed Specimen:  Blood from Line, Subclavian, Right Updated:  04/06/19 1612     Blood Culture, Routine No Growth to date     Blood Culture, Routine No Growth to date    Narrative:       Aerobic and anaerobic    Culture, Respiratory with Gram Stain [[id number]] Collected:  04/05/19 1429    Order Status:  Completed Specimen:  Respiratory from Sputum Updated:  04/06/19 0739     Respiratory Culture Insufficient incubation, culture in progress     Gram Stain (Respiratory) <10 epithelial cells per low power field.     Gram Stain (Respiratory) Many WBC's     Gram Stain (Respiratory) Many Gram positive cocci     Gram Stain (Respiratory) Rare Gram positive rods     Gram Stain (Respiratory) Rare yeast    Influenza A & B by Molecular [[id number]] Collected:  04/05/19 1429    Order Status:  Completed Specimen:  Nasopharyngeal Swab Updated:  04/05/19 1521     Influenza A, Molecular Negative     Influenza B, Molecular Negative     Flu A & B Source Nasal swab    Respiratory Viral Panel by PCR [facility]; Nasal Swab [[id number]] Collected:  04/05/19 1429    Order Status:  Sent Specimen:  Respiratory Updated:  04/05/19 1451          I have reviewed all pertinent labs within the past 24 hours.    Diagnostic Results:  Labs: Reviewed  X-Ray: Reviewed

## 2019-04-07 NOTE — ASSESSMENT & PLAN NOTE
Patient s/p BLT secondary to CF in 2014 with known CARLOS EDUARDO and currently undergoing workup for re-transplantation. Will plan to continue current immunosuppression and prophylaxis. Was scheduled for pulmonary rehab outpatient, but did not follow up. Will follow up on pending items of lung transplant workup while inpatient. PT for deconditioning.

## 2019-04-07 NOTE — PLAN OF CARE
Problem: Adult Inpatient Plan of Care  Goal: Plan of Care Review  Outcome: Ongoing (interventions implemented as appropriate)    Pt AAOx 4.  at the bedside.  NGTD on infectious workup   Pt started on IV cef and vanc based on most recent respiratory infection sensitivities.  Pre medicated with 50 of IV benadryl for prior to IV benadryl d/t  past history of red man syndrome.   Pt reports SOB on exertion.  VSS. Satting 92-93% on 4L. Pt requesting to increase O2 to 5L over night.  95% on 5L NC. Pt has remained afebrile.  R SC port CDI.  No acute changes over night.  Pt c/o pleuritic chest pain. Pt given Oxy 10 Q4 PRN.   ENT to complete consult today and possibly attempt to remove tube from ear.  Pt has remained free from falls or injury thus far. Bed is in low/ locked position, side rails are up x 2, call light is in reach. Will continue to monitor.

## 2019-04-07 NOTE — PROGRESS NOTES
[facility]  Lung Transplant  Progress Note - Floor    Patient Name: Juanita Ibarra  MRN: [id number]  Admission Date: 4/5/2019  Hospital Length of Stay: 2 days  Post-Operative Day: 1760  Attending Physician: Wiliam Mae MD  Primary Care Provider: Primary Doctor No     Subjective:     Interval History: No acute events overnight. Cough more productive today. Continues to complain of right pleuritic chest pain and poor appetite. Patient more dyspneic with minimal activity.    Continuous Infusions:  Scheduled Meds:   calcium-vitamin D3  1 tablet Oral BID WM    ceFEPime (MAXIPIME) IVPB  2 g Intravenous Q8H    dapsone  100 mg Oral Daily    enoxaparin  40 mg Subcutaneous Daily    fluconazole  150 mg Oral Once    fluticasone  2 spray Each Nare Daily    fluticasone-vilanterol  1 puff Inhalation Daily    folic acid  1,000 mcg Oral Daily    gabapentin  300 mg Oral TID    levalbuterol  1.25 mg Nebulization TID    lipase-protease-amylase 12,000-38,000-60,000 units  4 capsule Oral TID WM    magnesium oxide  400 mg Oral BID    montelukast  10 mg Oral Daily    morphine  15 mg Oral BID    multivit-iron-FA-calcium-mins  1 tablet Oral BID    polyethylene glycol  17 g Oral BID    predniSONE  5 mg Oral Daily    QUEtiapine  25 mg Oral Daily    QUEtiapine  50 mg Oral QHS    tacrolimus  2.5 mg Oral BID    topiramate  25 mg Oral BID    vancomycin (VANCOCIN) IVPB  1,000 mg Intravenous Q12H    venlafaxine  150 mg Oral QHS    venlafaxine  37.5 mg Oral QHS     PRN Meds:acetaminophen, albuterol-ipratropium, butalbital-acetaminophen-caffeine -40 mg, diphenhydrAMINE, diphenhydrAMINE, LORazepam, magnesium sulfate IVPB **AND** magnesium sulfate IVPB, ondansetron, oxyCODONE, potassium chloride 10% **AND** potassium chloride 10% **AND** potassium chloride 10%, promethazine (PHENERGAN) IVPB, promethazine, tiZANidine    Review of patient's allergies indicates:   Allergen Reactions     Albuterol Palpitations    Colistin Anaphylaxis    Vancomycin analogues      Infusion reaction that does not resolve with slowing    Neupogen [filgrastim] Other (See Comments)     Ostealgia after five daily doses of 300 mcg.      Bactrim [sulfamethoxazole-trimethoprim] Hives    Ceftazidime Hives     Pt stated can tolerate cefapine not ceftazidime    Ceftazidime     Dronabinol Other (See Comments)     Mental changes/hallucinations    Haldol [haloperidol lactate] Other (See Comments)     Seizure like activity    Nsaids (non-steroidal anti-inflammatory drug)      Cannot have due to lung transplant    Adhesive Rash     Cloth tape- please use tegaderm or paper tape    Aztreonam Rash    Ciprofloxacin Nausea And Vomiting     Projectile N/V, per patient.  Unwilling to retry therapy.       Review of Systems   Constitutional: Positive for activity change, appetite change and fatigue. Negative for chills and fever.   HENT: Positive for congestion, rhinorrhea, sinus pain and tinnitus. Negative for ear discharge, ear pain and sore throat.    Eyes: Negative for discharge and redness.   Respiratory: Positive for cough, shortness of breath and wheezing. Negative for chest tightness.    Cardiovascular: Positive for chest pain. Negative for palpitations and leg swelling.   Gastrointestinal: Positive for nausea. Negative for abdominal pain, constipation, diarrhea and vomiting.   Endocrine: Negative for polydipsia and polyuria.   Genitourinary: Negative for difficulty urinating, dysuria, frequency and urgency.   Musculoskeletal: Positive for myalgias. Negative for arthralgias, neck pain and neck stiffness.   Skin: Positive for pallor. Negative for rash.   Allergic/Immunologic: Positive for immunocompromised state.   Neurological: Positive for headaches. Negative for dizziness, weakness, light-headedness and numbness.   Hematological: Negative for adenopathy. Does not bruise/bleed easily.   Psychiatric/Behavioral: Negative  for confusion, decreased concentration and hallucinations.     Objective:   Physical Exam   Constitutional: She is oriented to person, place, and time. She appears well-developed and well-nourished. She has a sickly appearance. No distress. Nasal cannula in place.   HENT:   Head: Normocephalic and atraumatic.   Right Ear: External ear normal.   Left Ear: External ear normal.   Nose: Nose normal.   Mouth/Throat: Oropharynx is clear and moist. No oropharyngeal exudate.   Eyes: Conjunctivae and EOM are normal. Right eye exhibits no discharge. Left eye exhibits no discharge.   Neck: Normal range of motion. Neck supple. No JVD present.   Cardiovascular: Regular rhythm. Tachycardia present. Exam reveals no friction rub.   No murmur heard.  Pulmonary/Chest: Tachypnea noted. She has no decreased breath sounds. She has wheezes in the right upper field and the left upper field. She has rhonchi. She has no rales.   Abdominal: Soft. Bowel sounds are normal. She exhibits distension (minimal). There is no tenderness.   Musculoskeletal: Normal range of motion. She exhibits no edema.   Lymphadenopathy:     She has no cervical adenopathy.   Neurological: She is alert and oriented to person, place, and time. No cranial nerve deficit.   Skin: Skin is warm and dry. Capillary refill takes less than 2 seconds. She is not diaphoretic. There is pallor.   Psychiatric: She has a normal mood and affect. Her behavior is normal. Judgment and thought content normal.   Nursing note and vitals reviewed.        Vital Signs (Most Recent):  Temp: 98.2 °F (36.8 °C) (04/07/19 0745)  Pulse: (!) 111 (04/07/19 0745)  Resp: 20 (04/07/19 0745)  BP: 126/71 (04/07/19 0745)  SpO2: 95 % (04/07/19 0745) Vital Signs (24h Range):  Temp:  [98.1 °F (36.7 °C)-98.3 °F (36.8 °C)] 98.2 °F (36.8 °C)  Pulse:  [] 111  Resp:  [17-21] 20  SpO2:  [93 %-98 %] 95 %  BP: (109-132)/(66-81) 126/71     Weight: 54.4 kg (120 lb)  Body mass index is 23.44 kg/m².    No intake or  output data in the 24 hours ending 04/07/19 1039    Significant Labs:  CBC:  Recent Labs   Lab 04/07/19  0520   WBC 5.38   RBC 3.00*   HGB 7.8*   HCT 26.9*   *   MCV 90   MCH 26.0*   MCHC 29.0*     BMP:  Recent Labs   Lab 04/07/19  0930      K 3.5      CO2 27   BUN 11   CREATININE 0.8   CALCIUM 8.8      Tacrolimus Levels:  Recent Labs   Lab 04/07/19  0520   TACROLIMUS 5.3     Microbiology:  Microbiology Results (last 7 days)     Procedure Component Value Units Date/Time    Blood culture x two cultures. Draw prior to antibiotics. [[id number]] Collected:  04/05/19 1429    Order Status:  Completed Specimen:  Blood from Line, Subclavian, Right Updated:  04/06/19 1612     Blood Culture, Routine No Growth to date     Blood Culture, Routine No Growth to date    Narrative:       Aerobic and anaerobic    Blood culture x two cultures. Draw prior to antibiotics. [[id number]] Collected:  04/05/19 1400    Order Status:  Completed Specimen:  Blood from Line, Subclavian, Right Updated:  04/06/19 1612     Blood Culture, Routine No Growth to date     Blood Culture, Routine No Growth to date    Narrative:       Aerobic and anaerobic    Culture, Respiratory with Gram Stain [[id number]] Collected:  04/05/19 1429    Order Status:  Completed Specimen:  Respiratory from Sputum Updated:  04/06/19 0739     Respiratory Culture Insufficient incubation, culture in progress     Gram Stain (Respiratory) <10 epithelial cells per low power field.     Gram Stain (Respiratory) Many WBC's     Gram Stain (Respiratory) Many Gram positive cocci     Gram Stain (Respiratory) Rare Gram positive rods     Gram Stain (Respiratory) Rare yeast    Influenza A & B by Molecular [[id number]] Collected:  04/05/19 1429    Order Status:  Completed Specimen:  Nasopharyngeal Swab Updated:  04/05/19 1521     Influenza A, Molecular Negative     Influenza B, Molecular Negative     Flu A & B Source Nasal swab    Respiratory Viral Panel by PCR [facility]; Nasal  Swab [[id number]] Collected:  04/05/19 1421    Order Status:  Sent Specimen:  Respiratory Updated:  04/05/19 9666          I have reviewed all pertinent labs within the past 24 hours.    Diagnostic Results:  Labs: Reviewed  X-Ray: Reviewed      Assessment/Plan:     * Lower respiratory tract infection  Respiratory culture from sputum and RVP pending. Blood cultures with NGTD.  Influenza testing negative. Procalcitonin negative. No leukocytosis. CXR without acute process. Continue empiric Vanc/Cef and follow up on cultures.     Lung replaced by transplant  Patient s/p BLT secondary to CF in 2014 with known CARLOS EDUARDO and currently undergoing workup for re-transplantation. Will plan to continue current immunosuppression and prophylaxis. Was scheduled for pulmonary rehab outpatient, but did not follow up. Will follow up on pending items of lung transplant workup while inpatient. PT for deconditioning.     Immunosuppression  Continue tacrolimus and prednisone. Currently off MMF secondary to recurrent infections. Will monitor daily tacrolimus levels and dose adjust accordingly.     Prophylactic antibiotic  Continue dapsone.     Sinusitis, chronic  Continue Flonase and Singulair. Followed by Dr. Olsen outpatient. ENT consulted, appreciate recs.     Chronic pain with opiate use  Established with pain management outpatient. Continue current regimen.     CF related Pancreatic insufficiency  Continue Creon with meals and snacks.         Shama Canales PA-C  Lung Transplant  [facility]

## 2019-04-07 NOTE — ASSESSMENT & PLAN NOTE
Recommend floxin 5 gtts BID to left ear  Will attempt removal of extruded PET tomorrow at bedside per pt request

## 2019-04-07 NOTE — ASSESSMENT & PLAN NOTE
29yoF presenting with acute exacerbation of chronic rhinosinusitis. Recent sinus cultures + for pseudomonas    Recommend:    - Nasal saline rinses BID  - Nasal saline spray TID  - Continue flonase  - Cefepime for pseudomonas coverage, vancomycin given +h/o MRSA

## 2019-04-08 PROBLEM — J15.211 STAPHYLOCOCCUS AUREUS PNEUMONIA: Status: ACTIVE | Noted: 2019-01-01

## 2019-04-08 NOTE — ASSESSMENT & PLAN NOTE
Patient s/p BLT secondary to CF in 2014 with known CARLOS EDUARDO and currently undergoing workup for re-transplantation.  Was scheduled for pulmonary rehab outpatient, but was deferred secondary to insurance coverage problems. PT ordered for deconditioning. Will plan to continue current immunosuppression and prophylaxis.

## 2019-04-08 NOTE — PROGRESS NOTES
Admit Note     Met with patient to assess needs. Patient is a 29 y.o. single female, admitted for Lower respiratory tract infection [J22]  SOB (shortness of breath) [R06.02]  Lung transplant status [Z94.2]. Pt is s/p lung txp and currently under evaluation for second lung txp .    Patient admitted over the weekend from home on 4/5/2019 .  At this time, patient presents as alert and oriented x 4, pleasant, good eye contact, well groomed, recall good, concentration/judgement good, average intelligence, calm, communicative, cooperative and asking and answering questions appropriately.  At this time, patients caregiver presents as not in room.     Household/Family Systems     Patient resides with patient's significant other, at [address] [place] [address].   Support system includes pt's mother Kim Ibarra, aunt Mariel Calero, and s/o Shawn Castrejon.  Patient does not have dependents that are need of being cared for.     Patients primary caregiver is Shawn Castrejon, patient's s/o, phone number [phone].  Confirmed patients contact information is [phone] (home);   Telephone Information:   Mobile [phone]     During admission, patient's caregiver plans to stay at home.  Confirmed patient and patients caregivers do have access to reliable transportation.    Cognitive Status/Learning     Patient reports reading ability as 8th grade and states patient does have difficulty with short-term memory.  Patient reports patient learns best by visual/auditory/hands on information.   Needed: No.   Highest education level: Grade School (0-8)    Vocation/Disability     Working for Income: No  If no, reason not working: Disability  Patient is disabled due to Cystic Fibrosis since 2004. Pt was taking classes part-time at [employer] in [place] but dropped out due to illness. Pt reports that she does not plan on returning.     Adherence     Patient reports a high level of adherence to patients  health care regimen.  Adherence counseling and education provided. Patient verbalizes understanding.    Substance Use    Patient reports the following substance usage.    Tobacco: none, patient denies any use.   Alcohol: none, patient denies any use.  Illicit Drugs/Non-prescribed Medications: none, patient denies any use. Pt sees pain management clinic for prescribed pain meds.  Patient states clear understanding of the potential impact of substance use.  Substance abstinence/cessation counseling, education and resources provided and reviewed.     Services Utilizing/ADLS    Infusion Service: Prior to admission, patient utilizing? no; in past with [facility] [phone]/ fx [phone]   Home Health: Prior to admission, patient utilizing? no  DME: Prior to admission, yes O2 through [facility]  Pulmonary/Cardiac Rehab: Prior to admission, no  Dialysis:  Prior to admission, no  Transplant Specialty Pharmacy:  Prior to admission, yes; [facility].     Prior to admission, patient reports patient was independent with ADLS and was driving.  Patient reports patient is able to care for self at this time.  Patient indicates a willingness to care for self once medically cleared to do so.    Insurance/Medications    Insured by   Payor/Plan Subscr  Sex Relation Sub. Ins. ID Effective Group Num   1. [employer]* ELOISE WILLIAM* 1990 Female Self [id number]                                    [address] [address]   2. MEDICAID - * ELOISE WILLIAM* 1990 Female  [id number] 18 [facility]                                   [address] [address]   Primary Insurance (for UNOS reporting): Public Insurance - Medicaid-[facility]  Secondary Insurance (for UNOS reporting): None    Patient reports patient is able to obtain and afford medications at this time and at time of discharge.      Living Will/Healthcare Power of     Patient states patient has a LW and/or HCPA on file. Pt HCPA is s/o Shawn Castrejon ([phone]).      Coping/Mental  Health    Patient is coping adequately with the aid of  s/o and mother.   Patient has a history mental health difficulties that are currently being managed with medication (Seroquel, Ativan, and Effexor). Pt's medications managed by psychiatrist that pt established with when moving to [place].  Pt presents as stable at this time Team continues to monitor.    Discharge Planning    At time of discharge, patient plans to return to patient's home under the care of s/o.  Patients significant other will transport patient.  Per rounds today, expected discharge date has not been medically determined at this time. Patient and patients caregiver  verbalize understanding and are involved in treatment planning and discharge process.    Additional Concerns    Patient is being followed for needs, education, resources, information, emotional support, supportive counseling, and for supportive and skilled discharge plan of care.  providing ongoing psychosocial support, education, resources and d/c planning as needed.  SW remains available.  provided resource list, patient choice, psychosocial and supportive counseling, resources, education, assistance and discharge planning with patient and caregiver involvement, ongoing SW availability and services as appropriate.

## 2019-04-08 NOTE — SUBJECTIVE & OBJECTIVE
Interval History: no new issues overnight.     Medications:  Continuous Infusions:  Scheduled Meds:   calcium-vitamin D3  1 tablet Oral BID WM    ceFEPime (MAXIPIME) IVPB  2 g Intravenous Q8H    dapsone  100 mg Oral Daily    enoxaparin  40 mg Subcutaneous Daily    fluticasone  2 spray Each Nare Daily    fluticasone-vilanterol  1 puff Inhalation Daily    folic acid  1,000 mcg Oral Daily    gabapentin  300 mg Oral TID    levalbuterol  1.25 mg Nebulization TID    lipase-protease-amylase 12,000-38,000-60,000 units  4 capsule Oral TID WM    magnesium oxide  400 mg Oral BID    montelukast  10 mg Oral Daily    morphine  15 mg Oral BID    multivit-iron-FA-calcium-mins  1 tablet Oral BID    polyethylene glycol  17 g Oral BID    predniSONE  5 mg Oral Daily    QUEtiapine  25 mg Oral Daily    QUEtiapine  50 mg Oral QHS    tacrolimus  2.5 mg Oral BID    topiramate  25 mg Oral BID    vancomycin (VANCOCIN) IVPB  1,000 mg Intravenous Q12H    venlafaxine  150 mg Oral QHS    venlafaxine  37.5 mg Oral QHS     PRN Meds:acetaminophen, albuterol-ipratropium, butalbital-acetaminophen-caffeine -40 mg, diphenhydrAMINE, diphenhydrAMINE, LORazepam, magnesium sulfate IVPB **AND** magnesium sulfate IVPB, ondansetron, oxyCODONE, potassium chloride 10% **AND** potassium chloride 10% **AND** potassium chloride 10%, promethazine (PHENERGAN) IVPB, promethazine, tiZANidine     Review of patient's allergies indicates:   Allergen Reactions    Albuterol Palpitations    Colistin Anaphylaxis    Vancomycin analogues      Infusion reaction that does not resolve with slowing    Neupogen [filgrastim] Other (See Comments)     Ostealgia after five daily doses of 300 mcg.      Bactrim [sulfamethoxazole-trimethoprim] Hives    Ceftazidime Hives     Pt stated can tolerate cefapine not ceftazidime    Ceftazidime     Dronabinol Other (See Comments)     Mental changes/hallucinations    Haldol [haloperidol lactate] Other (See  Comments)     Seizure like activity    Nsaids (non-steroidal anti-inflammatory drug)      Cannot have due to lung transplant    Adhesive Rash     Cloth tape- please use tegaderm or paper tape    Aztreonam Rash    Ciprofloxacin Nausea And Vomiting     Projectile N/V, per patient.  Unwilling to retry therapy.     Objective:     Vital Signs (24h Range):  Temp:  [98.1 °F (36.7 °C)-98.4 °F (36.9 °C)] 98.4 °F (36.9 °C)  Pulse:  [] 110  Resp:  [16-20] 20  SpO2:  [94 %-96 %] 96 %  BP: (126-139)/(71-86) 131/79       Lines/Drains/Airways     Central Venous Catheter Line                 Port A Cath Single Lumen 04/05/19 1400 right subclavian 2 days                Physical Exam  Awake, Alert and Oriented. NAD, E4V5M6  Pupils equal, round & brisk. EOMI, no proptosis  Vision grossly intact, Hearing grossly intact   Ears              AD: no cerumen in EAC. TM non-erythematous, non-bulging. PET in place and patent. No middle ear effusion              AS: minimal cerumen. PET extruded into proximal EAC with minimal surrounding purulence. Small TM perforation in the anterior-inferior quadrant  Face symmetric, non-edematous, SILT  Tongue midline on extension, non-edematous, soft  Neck is symmetric, no masses, no LAD  Normal work of breathing, no stridor  Good phonation      Significant Labs:  All pertinent labs from the last 24 hours have been reviewed.    Significant Diagnostics:  I have reviewed and interpreted all pertinent imaging results/findings within the past 24 hours.

## 2019-04-08 NOTE — ASSESSMENT & PLAN NOTE
Continue Flonase and Singulair. Followed by Dr. Olsen outpatient. ENT consulted, appreciate recs. Plan to repeat CT Sinuses today per ENT's request.

## 2019-04-08 NOTE — PROGRESS NOTES
[facility]  Otorhinolaryngology-Head & Neck Surgery  Progress Note    Subjective:     Post-Op Info:  * No surgery found *      Hospital Day: 3     Interval History: no new issues overnight.     Medications:  Continuous Infusions:  Scheduled Meds:   calcium-vitamin D3  1 tablet Oral BID WM    ceFEPime (MAXIPIME) IVPB  2 g Intravenous Q8H    dapsone  100 mg Oral Daily    enoxaparin  40 mg Subcutaneous Daily    fluticasone  2 spray Each Nare Daily    fluticasone-vilanterol  1 puff Inhalation Daily    folic acid  1,000 mcg Oral Daily    gabapentin  300 mg Oral TID    levalbuterol  1.25 mg Nebulization TID    lipase-protease-amylase 12,000-38,000-60,000 units  4 capsule Oral TID WM    magnesium oxide  400 mg Oral BID    montelukast  10 mg Oral Daily    morphine  15 mg Oral BID    multivit-iron-FA-calcium-mins  1 tablet Oral BID    polyethylene glycol  17 g Oral BID    predniSONE  5 mg Oral Daily    QUEtiapine  25 mg Oral Daily    QUEtiapine  50 mg Oral QHS    tacrolimus  2.5 mg Oral BID    topiramate  25 mg Oral BID    vancomycin (VANCOCIN) IVPB  1,000 mg Intravenous Q12H    venlafaxine  150 mg Oral QHS    venlafaxine  37.5 mg Oral QHS     PRN Meds:acetaminophen, albuterol-ipratropium, butalbital-acetaminophen-caffeine -40 mg, diphenhydrAMINE, diphenhydrAMINE, LORazepam, magnesium sulfate IVPB **AND** magnesium sulfate IVPB, ondansetron, oxyCODONE, potassium chloride 10% **AND** potassium chloride 10% **AND** potassium chloride 10%, promethazine (PHENERGAN) IVPB, promethazine, tiZANidine     Review of patient's allergies indicates:   Allergen Reactions    Albuterol Palpitations    Colistin Anaphylaxis    Vancomycin analogues      Infusion reaction that does not resolve with slowing    Neupogen [filgrastim] Other (See Comments)     Ostealgia after five daily doses of 300 mcg.      Bactrim [sulfamethoxazole-trimethoprim] Hives    Ceftazidime Hives     Pt stated can  tolerate cefapine not ceftazidime    Ceftazidime     Dronabinol Other (See Comments)     Mental changes/hallucinations    Haldol [haloperidol lactate] Other (See Comments)     Seizure like activity    Nsaids (non-steroidal anti-inflammatory drug)      Cannot have due to lung transplant    Adhesive Rash     Cloth tape- please use tegaderm or paper tape    Aztreonam Rash    Ciprofloxacin Nausea And Vomiting     Projectile N/V, per patient.  Unwilling to retry therapy.     Objective:     Vital Signs (24h Range):  Temp:  [98.1 °F (36.7 °C)-98.4 °F (36.9 °C)] 98.4 °F (36.9 °C)  Pulse:  [] 110  Resp:  [16-20] 20  SpO2:  [94 %-96 %] 96 %  BP: (126-139)/(71-86) 131/79       Lines/Drains/Airways     Central Venous Catheter Line                 Port A Cath Single Lumen 04/05/19 1400 right subclavian 2 days                Physical Exam  Awake, Alert and Oriented. NAD, E4V5M6  Pupils equal, round & brisk. EOMI, no proptosis  Vision grossly intact, Hearing grossly intact   Ears              AD: no cerumen in EAC. TM non-erythematous, non-bulging. PET in place and patent. No middle ear effusion              AS: minimal cerumen. PET extruded into proximal EAC with minimal surrounding purulence. Small TM perforation in the anterior-inferior quadrant  Face symmetric, non-edematous, SILT  Tongue midline on extension, non-edematous, soft  Neck is symmetric, no masses, no LAD  Normal work of breathing, no stridor  Good phonation      Significant Labs:  All pertinent labs from the last 24 hours have been reviewed.    Significant Diagnostics:  I have reviewed and interpreted all pertinent imaging results/findings within the past 24 hours.    Assessment/Plan:     Acute otitis externa of left ear  Recommend floxin 5 gtts BID to left ear  Extruded AS PET removed today per pt request     Sinusitis, chronic  29yoF presenting with acute exacerbation of chronic rhinosinusitis. Recent sinus cultures + for  pseudomonas    Recommend:    - Nasal saline rinses BID  - Nasal saline spray TID  - Continue flonase  - Cefepime for pseudomonas coverage, vancomycin given +h/o MRSA      Miguel Barboza DO  Otorhinolaryngology-Head & Neck Surgery  [facility]  04/07/2019

## 2019-04-08 NOTE — SUBJECTIVE & OBJECTIVE
Subjective:     Interval History: No acute events overnight. Reports symptomatic improvement in cough and SOB. Remains on 4L NC with sats 94-98%. Remains afebrile on vanc/cef. Plan for CT Sinuses today per ENT request.     Continuous Infusions:  Scheduled Meds:   calcium-vitamin D3  1 tablet Oral BID WM    ceFEPime (MAXIPIME) IVPB  2 g Intravenous Q8H    dapsone  100 mg Oral Daily    enoxaparin  40 mg Subcutaneous Daily    fluticasone  2 spray Each Nare Daily    folic acid  1,000 mcg Oral Daily    gabapentin  300 mg Oral TID    levalbuterol  1.25 mg Nebulization TID    lipase-protease-amylase 12,000-38,000-60,000 units  4 capsule Oral TID WM    magnesium oxide  400 mg Oral BID    mometasone-formoterol  1 puff Inhalation BID    montelukast  10 mg Oral Daily    morphine  15 mg Oral BID    multivit-iron-FA-calcium-mins  1 tablet Oral BID    omeprazole  40 mg Oral BID    polyethylene glycol  17 g Oral BID    predniSONE  5 mg Oral Daily    QUEtiapine  25 mg Oral Daily    QUEtiapine  50 mg Oral QHS    tacrolimus  2.5 mg Oral BID    topiramate  25 mg Oral BID    vancomycin (VANCOCIN) IVPB  1,000 mg Intravenous Q12H    venlafaxine  150 mg Oral QHS    venlafaxine  37.5 mg Oral QHS     PRN Meds:acetaminophen, albuterol-ipratropium, butalbital-acetaminophen-caffeine -40 mg, diphenhydrAMINE, diphenhydrAMINE, LORazepam, magnesium sulfate IVPB **AND** magnesium sulfate IVPB, ondansetron, oxyCODONE, potassium chloride 10% **AND** potassium chloride 10% **AND** potassium chloride 10%, promethazine (PHENERGAN) IVPB, promethazine, tiZANidine    Review of patient's allergies indicates:   Allergen Reactions    Albuterol Palpitations    Colistin Anaphylaxis    Vancomycin analogues      Infusion reaction that does not resolve with slowing    Neupogen [filgrastim] Other (See Comments)     Ostealgia after five daily doses of 300 mcg.      Bactrim [sulfamethoxazole-trimethoprim] Hives    Ceftazidime Hives      Pt stated can tolerate cefapine not ceftazidime    Ceftazidime     Dronabinol Other (See Comments)     Mental changes/hallucinations    Haldol [haloperidol lactate] Other (See Comments)     Seizure like activity    Nsaids (non-steroidal anti-inflammatory drug)      Cannot have due to lung transplant    Adhesive Rash     Cloth tape- please use tegaderm or paper tape    Aztreonam Rash    Ciprofloxacin Nausea And Vomiting     Projectile N/V, per patient.  Unwilling to retry therapy.       Review of Systems   Constitutional: Positive for activity change, appetite change and fatigue. Negative for chills and fever.   HENT: Positive for congestion, rhinorrhea, sinus pain and tinnitus. Negative for ear discharge, ear pain and sore throat.    Eyes: Negative for discharge and redness.   Respiratory: Positive for cough and shortness of breath. Negative for chest tightness and wheezing.    Cardiovascular: Negative for chest pain, palpitations and leg swelling.   Gastrointestinal: Positive for nausea. Negative for abdominal pain, constipation, diarrhea and vomiting.   Endocrine: Negative for polydipsia and polyuria.   Genitourinary: Negative for difficulty urinating, dysuria, frequency and urgency.   Musculoskeletal: Positive for myalgias. Negative for arthralgias, neck pain and neck stiffness.   Skin: Positive for pallor. Negative for rash.   Allergic/Immunologic: Positive for immunocompromised state.   Neurological: Positive for headaches. Negative for dizziness, weakness, light-headedness and numbness.   Hematological: Negative for adenopathy. Does not bruise/bleed easily.   Psychiatric/Behavioral: Negative for confusion, decreased concentration and hallucinations.     Objective:   Physical Exam   Constitutional: She is oriented to person, place, and time. She appears well-developed and well-nourished. She has a sickly appearance. No distress. Nasal cannula in place.   HENT:   Head: Normocephalic and atraumatic.    Right Ear: External ear normal.   Left Ear: External ear normal.   Nose: Nose normal.   Mouth/Throat: Oropharynx is clear and moist. No oropharyngeal exudate.   Eyes: Conjunctivae and EOM are normal. Right eye exhibits no discharge. Left eye exhibits no discharge.   Neck: Normal range of motion. Neck supple. No JVD present.   Cardiovascular: Regular rhythm. Tachycardia present. Exam reveals no friction rub.   No murmur heard.  Pulmonary/Chest: Tachypnea noted. She has no decreased breath sounds. She has wheezes in the right upper field and the left upper field. She has rhonchi. She has no rales.   Abdominal: Soft. Bowel sounds are normal. She exhibits distension (minimal). There is no tenderness.   Musculoskeletal: Normal range of motion. She exhibits no edema.   Lymphadenopathy:     She has no cervical adenopathy.   Neurological: She is alert and oriented to person, place, and time. No cranial nerve deficit.   Skin: Skin is warm and dry. Capillary refill takes less than 2 seconds. She is not diaphoretic. There is pallor.   Psychiatric: She has a normal mood and affect. Her behavior is normal. Judgment and thought content normal.   Nursing note and vitals reviewed.        Vital Signs (Most Recent):  Temp: 98.5 °F (36.9 °C) (04/08/19 0831)  Pulse: 89 (04/08/19 0851)  Resp: 16 (04/08/19 0851)  BP: (!) 137/96 (04/08/19 0831)  SpO2: 99 % (04/08/19 0851) Vital Signs (24h Range):  Temp:  [98.4 °F (36.9 °C)-98.5 °F (36.9 °C)] 98.5 °F (36.9 °C)  Pulse:  [] 89  Resp:  [16-20] 16  SpO2:  [95 %-99 %] 99 %  BP: (131-139)/(78-96) 137/96     Weight: 69.5 kg (153 lb 3.5 oz)  Body mass index is 29.92 kg/m².    No intake or output data in the 24 hours ending 04/08/19 1132    Significant Labs:  CBC:  Recent Labs   Lab 04/08/19  0407   WBC 5.44   RBC 3.02*   HGB 7.7*   HCT 27.4*   *   MCV 91   MCH 25.5*   MCHC 28.1*     BMP:  Recent Labs   Lab 04/08/19  0407      K 4.0      CO2 27   BUN 10   CREATININE 0.8    CALCIUM 9.3      Tacrolimus Levels:  Recent Labs   Lab 04/08/19  0407   TACROLIMUS 7.9     Microbiology:  Microbiology Results (last 7 days)     Procedure Component Value Units Date/Time    Culture, Respiratory with Gram Stain [[id number]]  (Susceptibility) Collected:  04/05/19 1429    Order Status:  Completed Specimen:  Respiratory from Sputum Updated:  04/08/19 0943     Respiratory Culture --     METHICILLIN RESISTANT STAPHYLOCOCCUS AUREUS  Many       Respiratory Culture --     PSEUDOMONAS AERUGINOSA  Moderate       Gram Stain (Respiratory) <10 epithelial cells per low power field.     Gram Stain (Respiratory) Many WBC's     Gram Stain (Respiratory) Many Gram positive cocci     Gram Stain (Respiratory) Rare Gram positive rods     Gram Stain (Respiratory) Rare yeast    Blood culture x two cultures. Draw prior to antibiotics. [[id number]] Collected:  04/05/19 1400    Order Status:  Completed Specimen:  Blood from Line, Subclavian, Right Updated:  04/07/19 1612     Blood Culture, Routine No Growth to date     Blood Culture, Routine No Growth to date     Blood Culture, Routine No Growth to date    Narrative:       Aerobic and anaerobic    Blood culture x two cultures. Draw prior to antibiotics. [[id number]] Collected:  04/05/19 1429    Order Status:  Completed Specimen:  Blood from Line, Subclavian, Right Updated:  04/07/19 1612     Blood Culture, Routine No Growth to date     Blood Culture, Routine No Growth to date     Blood Culture, Routine No Growth to date    Narrative:       Aerobic and anaerobic    Influenza A & B by Molecular [[id number]] Collected:  04/05/19 1429    Order Status:  Completed Specimen:  Nasopharyngeal Swab Updated:  04/05/19 1521     Influenza A, Molecular Negative     Influenza B, Molecular Negative     Flu A & B Source Nasal swab    Respiratory Viral Panel by PCR [facility]; Nasal Swab [[id number]] Collected:  04/05/19 1429    Order Status:  Sent Specimen:  Respiratory Updated:  04/05/19 1451           I have reviewed all pertinent labs within the past 24 hours.    Diagnostic Results:  Labs: Reviewed  X-Ray: Reviewed

## 2019-04-08 NOTE — PROGRESS NOTES
[facility]  Otorhinolaryngology-Head & Neck Surgery  Progress Note    Subjective:     Post-Op Info:  * No surgery found *      Hospital Day: 4     Interval History: No new ENT issues overnight. Ear and sinus symptoms stable.     Medications:  Continuous Infusions:  Scheduled Meds:   calcium-vitamin D3  1 tablet Oral BID WM    ceFEPime (MAXIPIME) IVPB  2 g Intravenous Q8H    dapsone  100 mg Oral Daily    enoxaparin  40 mg Subcutaneous Daily    fluticasone  2 spray Each Nare Daily    fluticasone-vilanterol  1 puff Inhalation Daily    folic acid  1,000 mcg Oral Daily    gabapentin  300 mg Oral TID    levalbuterol  1.25 mg Nebulization TID    lipase-protease-amylase 12,000-38,000-60,000 units  4 capsule Oral TID WM    magnesium oxide  400 mg Oral BID    mometasone-formoterol  1 puff Inhalation BID    montelukast  10 mg Oral Daily    morphine  15 mg Oral BID    multivit-iron-FA-calcium-mins  1 tablet Oral BID    polyethylene glycol  17 g Oral BID    predniSONE  5 mg Oral Daily    QUEtiapine  25 mg Oral Daily    QUEtiapine  50 mg Oral QHS    tacrolimus  2.5 mg Oral BID    topiramate  25 mg Oral BID    vancomycin (VANCOCIN) IVPB  1,000 mg Intravenous Q12H    venlafaxine  150 mg Oral QHS    venlafaxine  37.5 mg Oral QHS     PRN Meds:acetaminophen, albuterol-ipratropium, butalbital-acetaminophen-caffeine -40 mg, diphenhydrAMINE, diphenhydrAMINE, LORazepam, magnesium sulfate IVPB **AND** magnesium sulfate IVPB, ondansetron, oxyCODONE, potassium chloride 10% **AND** potassium chloride 10% **AND** potassium chloride 10%, promethazine (PHENERGAN) IVPB, promethazine, tiZANidine     Review of patient's allergies indicates:   Allergen Reactions    Albuterol Palpitations    Colistin Anaphylaxis    Vancomycin analogues      Infusion reaction that does not resolve with slowing    Neupogen [filgrastim] Other (See Comments)     Ostealgia after five daily doses of 300 mcg.       Bactrim [sulfamethoxazole-trimethoprim] Hives    Ceftazidime Hives     Pt stated can tolerate cefapine not ceftazidime    Ceftazidime     Dronabinol Other (See Comments)     Mental changes/hallucinations    Haldol [haloperidol lactate] Other (See Comments)     Seizure like activity    Nsaids (non-steroidal anti-inflammatory drug)      Cannot have due to lung transplant    Adhesive Rash     Cloth tape- please use tegaderm or paper tape    Aztreonam Rash    Ciprofloxacin Nausea And Vomiting     Projectile N/V, per patient.  Unwilling to retry therapy.     Objective:     Vital Signs (24h Range):  Temp:  [98.1 °F (36.7 °C)-98.5 °F (36.9 °C)] 98.5 °F (36.9 °C)  Pulse:  [] 89  Resp:  [16-20] 16  SpO2:  [94 %-99 %] 99 %  BP: (131-139)/(78-96) 137/96       Lines/Drains/Airways     Central Venous Catheter Line                 Port A Cath Single Lumen 04/05/19 1400 right subclavian 2 days                Physical Exam  Awake, Alert and Oriented. NAD, E4V5M6  Pupils equal, round. EOMI, no proptosis  Vision grossly intact, Hearing grossly intact   Face symmetric, non-edematous  Neck is symmetric  Normal work of breathing, no stridor  Good phonation      Significant Labs:  All pertinent labs from the last 24 hours have been reviewed.    Significant Diagnostics:  I have reviewed all pertinent imaging results/findings within the past 24 hours.    Assessment/Plan:     Acute otitis externa of left ear  Recommend floxin 5 gtts BID to left ear  Extruded AS PET removed      Sinusitis, chronic  29yoF presenting with acute exacerbation of chronic rhinosinusitis. Sinus cultures Jan '19 + for pseudomonas.     Continue sinus regimen:   - Nasal saline rinses BID  - Nasal saline spray TID  - Flonase    Will defer indication for IV abx to primary team. Cefepime and vancomycin likely to provide adequate sinus coverage.    May benefit from surgical washout of her sinuses. She would like to pursue this option if offered. Please  obtain NECT of her sinuses for surgical planning. Will continue to coordinate OR date with staff, anticipate availability sometime this week.             Miguel Barboza,   Otorhinolaryngology-Head & Neck Surgery  [facility]  04/08/2019

## 2019-04-08 NOTE — PLAN OF CARE
Pt AAOx4, independent, VSS, afebrile. Continues on IV abx. Premedicated with IV benadryl for vanc administration r/t hx of red man syndrome. NGTD on infectious workup. Pt with frequent cough, productive, green tinged sputum. On oxygen, 4 L NC, sats >94%.  at bedside. Bed in lowest position, wheels locked, call light in reach,  at bedside.

## 2019-04-08 NOTE — ASSESSMENT & PLAN NOTE
29yoF presenting with acute exacerbation of chronic rhinosinusitis. Sinus cultures Jan '19 + for pseudomonas.     Continue sinus regimen:   - Nasal saline rinses BID  - Nasal saline spray TID  - Flonase    Will defer indication for IV abx to primary team. Cefepime and vancomycin likely to provide adequate sinus coverage.    May benefit from surgical washout of her sinuses. She would like to pursue this option if offered. Please obtain NECT of her sinuses for surgical planning. Will continue to coordinate OR date with staff, anticipate availability sometime this week.

## 2019-04-08 NOTE — PROGRESS NOTES
[facility]  Lung Transplant  Progress Note - Floor    Patient Name: Juanita Ibarra  MRN: [id number]  Admission Date: 4/5/2019  Hospital Length of Stay: 3 days  Post-Operative Day: 1761  Attending Physician: Francisca Morin DO  Primary Care Provider: Primary Doctor No     Subjective:     Interval History: No acute events overnight. Reports symptomatic improvement in cough and SOB. Remains on 4L NC with sats 94-98%. Remains afebrile on vanc/cef. Plan for CT Sinuses today per ENT request.     Continuous Infusions:  Scheduled Meds:   calcium-vitamin D3  1 tablet Oral BID WM    ceFEPime (MAXIPIME) IVPB  2 g Intravenous Q8H    dapsone  100 mg Oral Daily    enoxaparin  40 mg Subcutaneous Daily    fluticasone  2 spray Each Nare Daily    folic acid  1,000 mcg Oral Daily    gabapentin  300 mg Oral TID    levalbuterol  1.25 mg Nebulization TID    lipase-protease-amylase 12,000-38,000-60,000 units  4 capsule Oral TID WM    magnesium oxide  400 mg Oral BID    mometasone-formoterol  1 puff Inhalation BID    montelukast  10 mg Oral Daily    morphine  15 mg Oral BID    multivit-iron-FA-calcium-mins  1 tablet Oral BID    omeprazole  40 mg Oral BID    polyethylene glycol  17 g Oral BID    predniSONE  5 mg Oral Daily    QUEtiapine  25 mg Oral Daily    QUEtiapine  50 mg Oral QHS    tacrolimus  2.5 mg Oral BID    topiramate  25 mg Oral BID    vancomycin (VANCOCIN) IVPB  1,000 mg Intravenous Q12H    venlafaxine  150 mg Oral QHS    venlafaxine  37.5 mg Oral QHS     PRN Meds:acetaminophen, albuterol-ipratropium, butalbital-acetaminophen-caffeine -40 mg, diphenhydrAMINE, diphenhydrAMINE, LORazepam, magnesium sulfate IVPB **AND** magnesium sulfate IVPB, ondansetron, oxyCODONE, potassium chloride 10% **AND** potassium chloride 10% **AND** potassium chloride 10%, promethazine (PHENERGAN) IVPB, promethazine, tiZANidine    Review of patient's allergies indicates:   Allergen Reactions     Albuterol Palpitations    Colistin Anaphylaxis    Vancomycin analogues      Infusion reaction that does not resolve with slowing    Neupogen [filgrastim] Other (See Comments)     Ostealgia after five daily doses of 300 mcg.      Bactrim [sulfamethoxazole-trimethoprim] Hives    Ceftazidime Hives     Pt stated can tolerate cefapine not ceftazidime    Ceftazidime     Dronabinol Other (See Comments)     Mental changes/hallucinations    Haldol [haloperidol lactate] Other (See Comments)     Seizure like activity    Nsaids (non-steroidal anti-inflammatory drug)      Cannot have due to lung transplant    Adhesive Rash     Cloth tape- please use tegaderm or paper tape    Aztreonam Rash    Ciprofloxacin Nausea And Vomiting     Projectile N/V, per patient.  Unwilling to retry therapy.       Review of Systems   Constitutional: Positive for activity change, appetite change and fatigue. Negative for chills and fever.   HENT: Positive for congestion, rhinorrhea, sinus pain and tinnitus. Negative for ear discharge, ear pain and sore throat.    Eyes: Negative for discharge and redness.   Respiratory: Positive for cough and shortness of breath. Negative for chest tightness and wheezing.    Cardiovascular: Negative for chest pain, palpitations and leg swelling.   Gastrointestinal: Positive for nausea. Negative for abdominal pain, constipation, diarrhea and vomiting.   Endocrine: Negative for polydipsia and polyuria.   Genitourinary: Negative for difficulty urinating, dysuria, frequency and urgency.   Musculoskeletal: Positive for myalgias. Negative for arthralgias, neck pain and neck stiffness.   Skin: Positive for pallor. Negative for rash.   Allergic/Immunologic: Positive for immunocompromised state.   Neurological: Positive for headaches. Negative for dizziness, weakness, light-headedness and numbness.   Hematological: Negative for adenopathy. Does not bruise/bleed easily.   Psychiatric/Behavioral: Negative for  confusion, decreased concentration and hallucinations.     Objective:   Physical Exam   Constitutional: She is oriented to person, place, and time. She appears well-developed and well-nourished. She has a sickly appearance. No distress. Nasal cannula in place.   HENT:   Head: Normocephalic and atraumatic.   Right Ear: External ear normal.   Left Ear: External ear normal.   Nose: Nose normal.   Mouth/Throat: Oropharynx is clear and moist. No oropharyngeal exudate.   Eyes: Conjunctivae and EOM are normal. Right eye exhibits no discharge. Left eye exhibits no discharge.   Neck: Normal range of motion. Neck supple. No JVD present.   Cardiovascular: Regular rhythm. Tachycardia present. Exam reveals no friction rub.   No murmur heard.  Pulmonary/Chest: Tachypnea noted. She has no decreased breath sounds. She has wheezes in the right upper field and the left upper field. She has rhonchi. She has no rales.   Abdominal: Soft. Bowel sounds are normal. She exhibits distension (minimal). There is no tenderness.   Musculoskeletal: Normal range of motion. She exhibits no edema.   Lymphadenopathy:     She has no cervical adenopathy.   Neurological: She is alert and oriented to person, place, and time. No cranial nerve deficit.   Skin: Skin is warm and dry. Capillary refill takes less than 2 seconds. She is not diaphoretic. There is pallor.   Psychiatric: She has a normal mood and affect. Her behavior is normal. Judgment and thought content normal.   Nursing note and vitals reviewed.        Vital Signs (Most Recent):  Temp: 98.5 °F (36.9 °C) (04/08/19 0831)  Pulse: 89 (04/08/19 0851)  Resp: 16 (04/08/19 0851)  BP: (!) 137/96 (04/08/19 0831)  SpO2: 99 % (04/08/19 0851) Vital Signs (24h Range):  Temp:  [98.4 °F (36.9 °C)-98.5 °F (36.9 °C)] 98.5 °F (36.9 °C)  Pulse:  [] 89  Resp:  [16-20] 16  SpO2:  [95 %-99 %] 99 %  BP: (131-139)/(78-96) 137/96     Weight: 69.5 kg (153 lb 3.5 oz)  Body mass index is 29.92 kg/m².    No intake or  output data in the 24 hours ending 04/08/19 1132    Significant Labs:  CBC:  Recent Labs   Lab 04/08/19 0407   WBC 5.44   RBC 3.02*   HGB 7.7*   HCT 27.4*   *   MCV 91   MCH 25.5*   MCHC 28.1*     BMP:  Recent Labs   Lab 04/08/19 0407      K 4.0      CO2 27   BUN 10   CREATININE 0.8   CALCIUM 9.3      Tacrolimus Levels:  Recent Labs   Lab 04/08/19 0407   TACROLIMUS 7.9     Microbiology:  Microbiology Results (last 7 days)     Procedure Component Value Units Date/Time    Culture, Respiratory with Gram Stain [[id number]]  (Susceptibility) Collected:  04/05/19 1429    Order Status:  Completed Specimen:  Respiratory from Sputum Updated:  04/08/19 0943     Respiratory Culture --     METHICILLIN RESISTANT STAPHYLOCOCCUS AUREUS  Many       Respiratory Culture --     PSEUDOMONAS AERUGINOSA  Moderate       Gram Stain (Respiratory) <10 epithelial cells per low power field.     Gram Stain (Respiratory) Many WBC's     Gram Stain (Respiratory) Many Gram positive cocci     Gram Stain (Respiratory) Rare Gram positive rods     Gram Stain (Respiratory) Rare yeast    Blood culture x two cultures. Draw prior to antibiotics. [[id number]] Collected:  04/05/19 1400    Order Status:  Completed Specimen:  Blood from Line, Subclavian, Right Updated:  04/07/19 1612     Blood Culture, Routine No Growth to date     Blood Culture, Routine No Growth to date     Blood Culture, Routine No Growth to date    Narrative:       Aerobic and anaerobic    Blood culture x two cultures. Draw prior to antibiotics. [[id number]] Collected:  04/05/19 1429    Order Status:  Completed Specimen:  Blood from Line, Subclavian, Right Updated:  04/07/19 1612     Blood Culture, Routine No Growth to date     Blood Culture, Routine No Growth to date     Blood Culture, Routine No Growth to date    Narrative:       Aerobic and anaerobic    Influenza A & B by Molecular [[id number]] Collected:  04/05/19 1429    Order Status:  Completed Specimen:   Nasopharyngeal Swab Updated:  04/05/19 1521     Influenza A, Molecular Negative     Influenza B, Molecular Negative     Flu A & B Source Nasal swab    Respiratory Viral Panel by PCR [facility]; Nasal Swab [[id number]] Collected:  04/05/19 1429    Order Status:  Sent Specimen:  Respiratory Updated:  04/05/19 1451          I have reviewed all pertinent labs within the past 24 hours.    Diagnostic Results:  Labs: Reviewed  X-Ray: Reviewed      Assessment/Plan:     * Staphylococcus aureus pneumonia  Blood cultures with NGTD. Previously with Coronavirus in 02/2019.  History of prior MRSA colonization.      Will continue empiric Vanc/Cef and follow up on culture sensitivities. Continue scheduled nebs with CPT.     Lung replaced by transplant  Patient s/p BLT secondary to CF in 2014 with known CARLOS EDUARDO and currently undergoing workup for re-transplantation.  Was scheduled for pulmonary rehab outpatient, but was deferred secondary to insurance coverage problems. PT ordered for deconditioning. Will plan to continue current immunosuppression and prophylaxis.    Immunosuppression  Continue tacrolimus and prednisone. Currently off MMF secondary to recurrent infections. Will monitor daily tacrolimus levels and dose adjust accordingly.     Prophylactic antibiotic  Continue dapsone.     Sinusitis, chronic  Continue Flonase and Singulair. Followed by Dr. Olsen outpatient. ENT consulted, appreciate recs. Plan to repeat CT Sinuses today per ENT's request.     Chronic pain with opiate use  Established with pain management outpatient. Continue current regimen.     CF related Pancreatic insufficiency  Continue Creon with meals and snacks.         Jacqueline Dumont PA-C  Lung Transplant  [facility]

## 2019-04-08 NOTE — ASSESSMENT & PLAN NOTE
Blood cultures with NGTD. Previously with Coronavirus in 02/2019.  History of prior MRSA colonization.      Will continue empiric Vanc/Cef and follow up on culture sensitivities. Continue scheduled nebs with CPT.

## 2019-04-08 NOTE — PLAN OF CARE
Problem: Adult Inpatient Plan of Care  Goal: Plan of Care Review  POC reviewed with patient. Pt verbalized understanding. Questions and concerns addressed. No acute events overnight. Vitals stable, pt on 4L nasal cannula. SOB with exertion noted. Pt complains of chest/back pain treated with prn oxycodone. Some anxiety noted. Pt progressing toward goals. Will continue to monitor.  See flow sheets for full assessment and VS info

## 2019-04-08 NOTE — SUBJECTIVE & OBJECTIVE
Interval History: No new ENT issues overnight. Ear and sinus symptoms stable.     Medications:  Continuous Infusions:  Scheduled Meds:   calcium-vitamin D3  1 tablet Oral BID WM    ceFEPime (MAXIPIME) IVPB  2 g Intravenous Q8H    dapsone  100 mg Oral Daily    enoxaparin  40 mg Subcutaneous Daily    fluticasone  2 spray Each Nare Daily    fluticasone-vilanterol  1 puff Inhalation Daily    folic acid  1,000 mcg Oral Daily    gabapentin  300 mg Oral TID    levalbuterol  1.25 mg Nebulization TID    lipase-protease-amylase 12,000-38,000-60,000 units  4 capsule Oral TID WM    magnesium oxide  400 mg Oral BID    mometasone-formoterol  1 puff Inhalation BID    montelukast  10 mg Oral Daily    morphine  15 mg Oral BID    multivit-iron-FA-calcium-mins  1 tablet Oral BID    polyethylene glycol  17 g Oral BID    predniSONE  5 mg Oral Daily    QUEtiapine  25 mg Oral Daily    QUEtiapine  50 mg Oral QHS    tacrolimus  2.5 mg Oral BID    topiramate  25 mg Oral BID    vancomycin (VANCOCIN) IVPB  1,000 mg Intravenous Q12H    venlafaxine  150 mg Oral QHS    venlafaxine  37.5 mg Oral QHS     PRN Meds:acetaminophen, albuterol-ipratropium, butalbital-acetaminophen-caffeine -40 mg, diphenhydrAMINE, diphenhydrAMINE, LORazepam, magnesium sulfate IVPB **AND** magnesium sulfate IVPB, ondansetron, oxyCODONE, potassium chloride 10% **AND** potassium chloride 10% **AND** potassium chloride 10%, promethazine (PHENERGAN) IVPB, promethazine, tiZANidine     Review of patient's allergies indicates:   Allergen Reactions    Albuterol Palpitations    Colistin Anaphylaxis    Vancomycin analogues      Infusion reaction that does not resolve with slowing    Neupogen [filgrastim] Other (See Comments)     Ostealgia after five daily doses of 300 mcg.      Bactrim [sulfamethoxazole-trimethoprim] Hives    Ceftazidime Hives     Pt stated can tolerate cefapine not ceftazidime    Ceftazidime     Dronabinol Other (See Comments)      Mental changes/hallucinations    Haldol [haloperidol lactate] Other (See Comments)     Seizure like activity    Nsaids (non-steroidal anti-inflammatory drug)      Cannot have due to lung transplant    Adhesive Rash     Cloth tape- please use tegaderm or paper tape    Aztreonam Rash    Ciprofloxacin Nausea And Vomiting     Projectile N/V, per patient.  Unwilling to retry therapy.     Objective:     Vital Signs (24h Range):  Temp:  [98.1 °F (36.7 °C)-98.5 °F (36.9 °C)] 98.5 °F (36.9 °C)  Pulse:  [] 89  Resp:  [16-20] 16  SpO2:  [94 %-99 %] 99 %  BP: (131-139)/(78-96) 137/96       Lines/Drains/Airways     Central Venous Catheter Line                 Port A Cath Single Lumen 04/05/19 1400 right subclavian 2 days                Physical Exam  Awake, Alert and Oriented. NAD, E4V5M6  Pupils equal, round. EOMI, no proptosis  Vision grossly intact, Hearing grossly intact   Face symmetric, non-edematous  Neck is symmetric  Normal work of breathing, no stridor  Good phonation      Significant Labs:  All pertinent labs from the last 24 hours have been reviewed.    Significant Diagnostics:  I have reviewed all pertinent imaging results/findings within the past 24 hours.

## 2019-04-09 PROBLEM — Z94.2 LUNG TRANSPLANT STATUS: Status: ACTIVE | Noted: 2019-01-01

## 2019-04-09 NOTE — SUBJECTIVE & OBJECTIVE
Interval History: CT of sinuses performed. Demonstrates worsening sinus disease from last scan done in 1/2019. Symptoms unchanged.    Medications:  Continuous Infusions:  Scheduled Meds:   calcium-vitamin D3  1 tablet Oral BID WM    dapsone  100 mg Oral Daily    enoxaparin  40 mg Subcutaneous Daily    fluticasone  2 spray Each Nare Daily    folic acid  1,000 mcg Oral Daily    gabapentin  300 mg Oral TID    levalbuterol  1.25 mg Nebulization TID    lipase-protease-amylase  4 capsule Oral TID WM    magnesium oxide  400 mg Oral BID    meropenem (MERREM) IVPB  1 g Intravenous Q8H    mometasone-formoterol  1 puff Inhalation BID    montelukast  10 mg Oral Daily    morphine  15 mg Oral BID    multivit-iron-FA-calcium-mins  1 tablet Oral BID    NON FORMULARY MEDICATION 4 capsule  4 capsule Oral TID WM    omeprazole  40 mg Oral BID    polyethylene glycol  17 g Oral BID    predniSONE  5 mg Oral Daily    QUEtiapine  25 mg Oral Daily    QUEtiapine  50 mg Oral QHS    tacrolimus  2.5 mg Oral BID    topiramate  25 mg Oral BID    vancomycin (VANCOCIN) IVPB  1,000 mg Intravenous Q12H    venlafaxine  150 mg Oral QHS    venlafaxine  37.5 mg Oral QHS     PRN Meds:acetaminophen, albuterol-ipratropium, butalbital-acetaminophen-caffeine -40 mg, diphenhydrAMINE, diphenhydrAMINE, hydrOXYzine HCl, LORazepam, magnesium sulfate IVPB **AND** magnesium sulfate IVPB, ondansetron, oxyCODONE, potassium chloride 10% **AND** potassium chloride 10% **AND** potassium chloride 10%, promethazine (PHENERGAN) IVPB, promethazine, tiZANidine     Review of patient's allergies indicates:   Allergen Reactions    Albuterol Palpitations    Colistin Anaphylaxis    Vancomycin analogues      Infusion reaction that does not resolve with slowing    Neupogen [filgrastim] Other (See Comments)     Ostealgia after five daily doses of 300 mcg.      Bactrim [sulfamethoxazole-trimethoprim] Hives    Ceftazidime Hives     Pt stated can  tolerate cefapine not ceftazidime    Ceftazidime     Dronabinol Other (See Comments)     Mental changes/hallucinations    Haldol [haloperidol lactate] Other (See Comments)     Seizure like activity    Nsaids (non-steroidal anti-inflammatory drug)      Cannot have due to lung transplant    Adhesive Rash     Cloth tape- please use tegaderm or paper tape    Aztreonam Rash    Ciprofloxacin Nausea And Vomiting     Projectile N/V, per patient.  Unwilling to retry therapy.     Objective:     Vital Signs (24h Range):  Temp:  [98.5 °F (36.9 °C)-100 °F (37.8 °C)] 100 °F (37.8 °C)  Pulse:  [] 93  Resp:  [16-20] 18  SpO2:  [92 %-99 %] 98 %  BP: (120-140)/(76-96) 120/81       Lines/Drains/Airways     Central Venous Catheter Line                 Port A Cath Single Lumen 04/05/19 1400 right subclavian 3 days                Physical Exam    Awake, Alert and Oriented. NAD, E4V5M6  Pupils equal, round. EOMI, no proptosis  Vision grossly intact, Hearing grossly intact   Face symmetric, non-edematous  Neck is symmetric  Normal work of breathing, no stridor  Good phonation      Significant Labs:  All pertinent labs from the last 24 hours have been reviewed.    Significant Diagnostics:  I have reviewed all pertinent imaging results/findings within the past 24 hours. 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

## 2019-04-09 NOTE — TRANSFER OF CARE
Anesthesia Transfer of Care Note    Patient: Juanita Ibarra    Procedure(s) Performed: Procedure(s) (LRB):  FESS, USING COMPUTER-ASSISTED NAVIGATION (N/A)    Patient location: PACU    Anesthesia Type: general    Transport from OR: Transported from OR on 6-10 L/min O2 by face mask with adequate spontaneous ventilation    Post pain: adequate analgesia    Post assessment: no apparent anesthetic complications and tolerated procedure well    Post vital signs: stable    Level of consciousness: awake, alert and oriented    Nausea/Vomiting: no nausea/vomiting    Complications: none    Transfer of care protocol was followed      Last vitals:   Visit Vitals  /84 (BP Location: Left arm, Patient Position: Lying)   Pulse (!) 120   Temp 36.6 °C (97.9 °F) (Temporal)   Resp 19   Ht 5' (1.524 m)   Wt 69.5 kg (153 lb 3.5 oz)   LMP 10/02/2016   SpO2 99%   Breastfeeding? No   BMI 29.92 kg/m²

## 2019-04-09 NOTE — PROGRESS NOTES
[facility]  Lung Transplant  Progress Note - Floor    Patient Name: Juanita Ibarra  MRN: [id number]  Admission Date: 4/5/2019  Hospital Length of Stay: 4 days  Post-Operative Day: 1762  Attending Physician: Francisca Morin DO  Primary Care Provider: Primary Doctor No     Subjective:     Interval History: No acute events overnight. Remains with dark yellow sputum production and cough.  Remains on 4L NC with sats 92-98%. Tmax 100 this morning. Cefepime transitioned to meropenem yesterday based on sensitivities. Plan for sinus washout with ENT today.     Continuous Infusions:  Scheduled Meds:   calcium-vitamin D3  1 tablet Oral BID WM    dapsone  100 mg Oral Daily    enoxaparin  40 mg Subcutaneous Daily    fluticasone  2 spray Each Nare Daily    folic acid  1,000 mcg Oral Daily    gabapentin  300 mg Oral TID    levalbuterol  0.63 mg Nebulization TID    lipase-protease-amylase  4 capsule Oral TID WM    magnesium oxide  400 mg Oral BID    meropenem (MERREM) IVPB  1 g Intravenous Q8H    mometasone-formoterol  1 puff Inhalation BID    montelukast  10 mg Oral Daily    morphine  15 mg Oral BID    multivit-iron-FA-calcium-mins  1 tablet Oral BID    NON FORMULARY MEDICATION 4 capsule  4 capsule Oral TID WM    omeprazole  40 mg Oral BID    polyethylene glycol  17 g Oral BID    predniSONE  5 mg Oral Daily    QUEtiapine  25 mg Oral Daily    QUEtiapine  50 mg Oral QHS    tacrolimus  2.5 mg Oral BID    topiramate  25 mg Oral BID    vancomycin (VANCOCIN) IVPB  1,000 mg Intravenous Q12H    venlafaxine  150 mg Oral QHS    venlafaxine  37.5 mg Oral QHS     PRN Meds:acetaminophen, albuterol-ipratropium, butalbital-acetaminophen-caffeine -40 mg, diphenhydrAMINE, diphenhydrAMINE, hydrOXYzine HCl, LORazepam, magnesium sulfate IVPB **AND** magnesium sulfate IVPB, ondansetron, oxyCODONE, potassium chloride 10% **AND** potassium chloride 10% **AND** potassium chloride 10%,  promethazine (PHENERGAN) IVPB, promethazine, tiZANidine    Review of patient's allergies indicates:   Allergen Reactions    Albuterol Palpitations    Colistin Anaphylaxis    Vancomycin analogues      Infusion reaction that does not resolve with slowing    Neupogen [filgrastim] Other (See Comments)     Ostealgia after five daily doses of 300 mcg.      Bactrim [sulfamethoxazole-trimethoprim] Hives    Ceftazidime Hives     Pt stated can tolerate cefapine not ceftazidime    Ceftazidime     Dronabinol Other (See Comments)     Mental changes/hallucinations    Haldol [haloperidol lactate] Other (See Comments)     Seizure like activity    Nsaids (non-steroidal anti-inflammatory drug)      Cannot have due to lung transplant    Adhesive Rash     Cloth tape- please use tegaderm or paper tape    Aztreonam Rash    Ciprofloxacin Nausea And Vomiting     Projectile N/V, per patient.  Unwilling to retry therapy.       Review of Systems   Constitutional: Positive for activity change, appetite change and fatigue. Negative for chills and fever.   HENT: Positive for congestion, rhinorrhea, sinus pain and tinnitus. Negative for ear discharge, ear pain and sore throat.    Eyes: Negative for discharge and redness.   Respiratory: Positive for cough and shortness of breath. Negative for chest tightness and wheezing.    Cardiovascular: Negative for chest pain, palpitations and leg swelling.   Gastrointestinal: Positive for nausea. Negative for abdominal pain, constipation, diarrhea and vomiting.   Endocrine: Negative for polydipsia and polyuria.   Genitourinary: Negative for difficulty urinating, dysuria, frequency and urgency.   Musculoskeletal: Positive for myalgias. Negative for arthralgias, neck pain and neck stiffness.   Skin: Positive for pallor. Negative for rash.   Allergic/Immunologic: Positive for immunocompromised state.   Neurological: Positive for headaches. Negative for dizziness, weakness, light-headedness and  numbness.   Hematological: Negative for adenopathy. Does not bruise/bleed easily.   Psychiatric/Behavioral: Negative for confusion, decreased concentration and hallucinations.     Objective:   Physical Exam   Constitutional: She is oriented to person, place, and time. She appears well-developed and well-nourished. She has a sickly appearance. No distress. Nasal cannula in place.   HENT:   Head: Normocephalic and atraumatic.   Right Ear: External ear normal.   Left Ear: External ear normal.   Nose: Nose normal.   Mouth/Throat: Oropharynx is clear and moist. No oropharyngeal exudate.   Eyes: Conjunctivae and EOM are normal. Right eye exhibits no discharge. Left eye exhibits no discharge.   Neck: Normal range of motion. Neck supple. No JVD present.   Cardiovascular: Normal rate, regular rhythm and normal heart sounds. Exam reveals no friction rub.   No murmur heard.  Pulmonary/Chest: Effort normal. Tachypnea noted. She has no decreased breath sounds. She has no wheezes. She has no rhonchi. She has no rales.   Abdominal: Soft. Bowel sounds are normal. She exhibits distension (minimal). There is no tenderness.   Musculoskeletal: Normal range of motion. She exhibits no edema.   Lymphadenopathy:     She has no cervical adenopathy.   Neurological: She is alert and oriented to person, place, and time. No cranial nerve deficit.   Skin: Skin is warm and dry. Capillary refill takes less than 2 seconds. She is not diaphoretic. There is pallor.   Psychiatric: She has a normal mood and affect. Her behavior is normal. Judgment and thought content normal.   Nursing note and vitals reviewed.        Vital Signs (Most Recent):  Temp: 98.7 °F (37.1 °C) (04/09/19 1133)  Pulse: 92 (04/09/19 1133)  Resp: 16 (04/09/19 1133)  BP: 124/89 (04/09/19 1133)  SpO2: 97 % (04/09/19 1133) Vital Signs (24h Range):  Temp:  [98.4 °F (36.9 °C)-100 °F (37.8 °C)] 98.7 °F (37.1 °C)  Pulse:  [] 92  Resp:  [16-20] 16  SpO2:  [92 %-98 %] 97 %  BP:  (120-140)/(76-89) 124/89     Weight: 69.5 kg (153 lb 3.5 oz)  Body mass index is 29.92 kg/m².      Intake/Output Summary (Last 24 hours) at 4/9/2019 1215  Last data filed at 4/8/2019 1735  Gross per 24 hour   Intake 1260 ml   Output --   Net 1260 ml       Significant Labs:  CBC:  Recent Labs   Lab 04/09/19 0447   WBC 5.69   RBC 3.28*   HGB 8.5*   HCT 29.7*   *   MCV 91   MCH 25.9*   MCHC 28.6*     BMP:  Recent Labs   Lab 04/09/19 0447      K 4.3      CO2 30*   BUN 9   CREATININE 0.8   CALCIUM 9.5      Tacrolimus Levels:  Recent Labs   Lab 04/09/19 0447   TACROLIMUS 5.2     Microbiology:  Microbiology Results (last 7 days)     Procedure Component Value Units Date/Time    Respiratory Viral Panel by PCR [facility]; Nasal Swab [[id number]] Collected:  04/05/19 1429    Order Status:  Completed Specimen:  Respiratory Updated:  04/09/19 1204     Respiratory Virus Panel, source Nasal Swab     RVP - Adenovirus Not Detected     Comment: Detects Serotypes B and E. Detection of Serotype C may   be limited. If Adenovirus infection is suspected and a   Not Detected result is returned the sample should be   re-tested for Adenovirus using an independent method  (e.g. [employer] [employer] Adenovirus Quantitative Real-Time  PCR test.          Enterovirus Not Detected     Comment: Cross-reactivity has been observed between certain Rhinovirus  strains and the Enterovirus assay.          Human Bocavirus Not Detected     Human Coronavirus Not Detected     Comment: The Human Coronavirus assay detects Human coronavirus types  229E, OC43,NL63 and HKU1.          RVP - Human Metapneumovirus (hMPV) Not Detected     RVP - Influenza A Not Detected     Influenza A - [id number] Not Detected     RVP - Influenza B Not Detected     Parainfluenza Not Detected     Respiratory Syncytial VirusVirus (RSV) A Not Detected     Comment: The Respiratory Syncytial Viral assay detects types A and B,  however it does not distinguish between the two.           RVP - Rhinovirus Not Detected     Comment: Cross-Reactivity has been observed between certain   Rhinovirus strains and the Enterovirus assay.  Target Enriched Mulitplex Polymerase Chain Reaction (TEM-PCR)  allows for the detection of multiple pathogens out of a single  reaction.  This test was developed and its performance   characteristics determined by [employer].  It has not   been cleared or approved by the U.[place].Food and Drug Administration.  Results should be used in conjunction with clinical findings,   and should not form the sole basis for a diagnosis or treatment  decision.  TEM-PCR is a licensed technology of [employer].         Narrative:       [facility]    Blood culture x two cultures. Draw prior to antibiotics. [[id number]] Collected:  04/05/19 1400    Order Status:  Completed Specimen:  Blood from Line, Subclavian, Right Updated:  04/08/19 1612     Blood Culture, Routine No Growth to date     Blood Culture, Routine No Growth to date     Blood Culture, Routine No Growth to date     Blood Culture, Routine No Growth to date    Narrative:       Aerobic and anaerobic    Blood culture x two cultures. Draw prior to antibiotics. [[id number]] Collected:  04/05/19 1429    Order Status:  Completed Specimen:  Blood from Line, Subclavian, Right Updated:  04/08/19 1612     Blood Culture, Routine No Growth to date     Blood Culture, Routine No Growth to date     Blood Culture, Routine No Growth to date     Blood Culture, Routine No Growth to date    Narrative:       Aerobic and anaerobic    Culture, Respiratory with Gram Stain [[id number]]  (Susceptibility) Collected:  04/05/19 1429    Order Status:  Completed Specimen:  Respiratory from Sputum Updated:  04/08/19 0943     Respiratory Culture --     METHICILLIN RESISTANT STAPHYLOCOCCUS AUREUS  Many       Respiratory Culture --     PSEUDOMONAS AERUGINOSA  Moderate       Gram Stain (Respiratory) <10 epithelial cells per low power  field.     Gram Stain (Respiratory) Many WBC's     Gram Stain (Respiratory) Many Gram positive cocci     Gram Stain (Respiratory) Rare Gram positive rods     Gram Stain (Respiratory) Rare yeast    Influenza A & B by Molecular [[id number]] Collected:  04/05/19 1429    Order Status:  Completed Specimen:  Nasopharyngeal Swab Updated:  04/05/19 1521     Influenza A, Molecular Negative     Influenza B, Molecular Negative     Flu A & B Source Nasal swab          I have reviewed all pertinent labs within the past 24 hours.    Diagnostic Results:  Labs: Reviewed  X-Ray: Reviewed      Assessment/Plan:     * Staphylococcus aureus pneumonia  Blood cultures with NGTD. Previously with Coronavirus in 02/2019.  History of prior MRSA colonization.      Pseudomonas aerguinosa and MRSA isolated from respiratory culture. Continue vancomycin and meropenem based on available sensitivities. Continue scheduled nebs with CPT.     Lung replaced by transplant  Patient s/p BLT secondary to CF in 2014 with known CARLOS EDUARDO and currently undergoing workup for re-transplantation.  Was scheduled for pulmonary rehab outpatient, but was deferred secondary to insurance coverage problems. PT ordered for deconditioning. Will plan to continue current immunosuppression and prophylaxis.    Immunosuppression  Continue tacrolimus and prednisone. Currently off MMF secondary to recurrent infections. Will monitor daily tacrolimus levels and dose adjust accordingly.     Prophylactic antibiotic  Continue dapsone.     Sinusitis, chronic  Continue Flonase and Singulair. Followed by Dr. Olsen outpatient. ENT consulted, appreciate recs. CT sinuses overnight with worsening sinus disease. To OR today for sinus washout.     Chronic pain with opiate use  Established with pain management outpatient. Continue current regimen.     CF related Pancreatic insufficiency  Continue Creon with meals and snacks.         Jacqueline Dumont PA-C  Lung Transplant  [facility]  [place]

## 2019-04-09 NOTE — NURSING TRANSFER
Nursing Transfer Note      4/9/2019     Transfer To: [id number]     Transfer via stretcher    Transfer with Oxygen, patient's phone    Transported by PCT    Medicines sent: N/A    Chart send with patient: Yes    Notified: spouse    Patient reassessed at: 4/9/2019

## 2019-04-09 NOTE — PLAN OF CARE
Problem: Physical Therapy Goal  Goal: Physical Therapy Goal  Goals to be met by: 19     Patient will increase functional independence with mobility by performin. Lower extremity exercise program x 20 reps per handout, with assistance as needed.  2. Pt to perf 10x sit<>stand: 25 sec, to demonstrate improvements in B LE mm strength.  3. Pt to perf 6MWT: amb 800', to demonstrate a clinically significant improvement in aerobic capacity.        Outcome: Ongoing (interventions implemented as appropriate)  PT goals established.

## 2019-04-09 NOTE — PLAN OF CARE
Problem: Adult Inpatient Plan of Care  Goal: Plan of Care Review  Outcome: Ongoing (interventions implemented as appropriate)  -AAOx4. Calm and cooperative.  is at the bedside. VSS.   -Patient complained of 8/10 back pain.  -Patient went to surgery today. Complained of face and head pain after procedure. Patient was given Oxy and dilaudid in PACU.  -Patient is on 4 L nasal cannula - sats 99%. Stated SOB on exertion.  -Will continue to monitor.

## 2019-04-09 NOTE — ASSESSMENT & PLAN NOTE
Blood cultures with NGTD. Previously with Coronavirus in 02/2019.  History of prior MRSA colonization.      Pseudomonas aerguinosa and MRSA isolated from respiratory culture. Continue vancomycin and meropenem based on available sensitivities. Continue scheduled nebs with CPT.

## 2019-04-09 NOTE — BRIEF OP NOTE
[facility]  Brief Operative Note     SUMMARY     Surgery Date: 4/9/2019     Surgeon(s) and Role:     * Adin Burks III, MD - Primary     * May Heath MD - Resident - Assisting        Pre-op Diagnosis:  Cystic fibrosis with pulmonary manifestations [E84.0]  Chronic maxillary sinusitis [J32.0]  Acute recurrent maxillary sinusitis [J01.01]    Post-op Diagnosis:  Post-Op Diagnosis Codes:     * Cystic fibrosis with pulmonary manifestations [E84.0]     * Chronic maxillary sinusitis [J32.0]     * Acute recurrent maxillary sinusitis [J01.01]    Procedure(s) (LRB):  FESS, USING COMPUTER-ASSISTED NAVIGATION (N/A)  Exam under anesthesia of bilateral ears (Bilateral)    Anesthesia: General    Description of the findings of the procedure: bilateral maxillary and ethmoid sinus removal of contents, thorough irrigation; cultures taken    Findings/Key Components: see full op note for details    Estimated Blood Loss: * No values recorded between 4/9/2019  1:42 PM and 4/9/2019  2:43 PM *         Specimens:   Specimen (12h ago, onward)    None

## 2019-04-09 NOTE — PT/OT/SLP EVAL
Physical Therapy Evaluation    Patient Name:  Juanita Ibarra   MRN:  [id number]    Recommendations:     Discharge Recommendations:  outpatient PT   Discharge Equipment Recommendations: none   Barriers to discharge: None    Assessment:     Juanita Ibarra is a 29 y.o. female admitted with a medical diagnosis of Staphylococcus aureus pneumonia.  She presents with the following impairments/functional limitations:  weakness, impaired endurance, impaired functional mobilty, gait instability, impaired cardiopulmonary response to activity, pain, decreased safety awareness.  Pt presents to [facility] for evaluation for re-transplantation of B lungs.  Currently presents as I with most functional mobility, inhibited by deficits in aerobic capacity.  Requires A when fatigued sec to episodes of LOB and scissoring with gait.    Rehab Prognosis: Fair; patient would benefit from acute skilled PT services to address these deficits and reach maximum level of function.    Recent Surgery: Procedure(s) (LRB):  FESS, USING COMPUTER-ASSISTED NAVIGATION (N/A)  Exam under anesthesia of bilateral ears (Bilateral)      Plan:     During this hospitalization, patient to be seen 3 x/week to address the identified rehab impairments via gait training, therapeutic activities, therapeutic exercises, neuromuscular re-education and progress toward the following goals:    · Plan of Care Expires:  05/07/19    Subjective     Chief Complaint: Difficulty amb long distances  Patient/Family Comments/goals: to get transplant  Pain/Comfort:  · Pain Rating 1: 7/10  · Location 1: back  · Pain Addressed 1: Nurse notified, Distraction, Cessation of Activity  · Pain Rating Post-Intervention 1: 8/10  · Pain Rating 2: 7/10  · Location 2: rib(s)  · Pain Addressed 2: Nurse notified, Distraction, Cessation of Activity  · Pain Rating Post-Intervention 2: 8/10    Patients cultural, spiritual, Scientologist conflicts given the current situation: no    Living  "Environment:  Pt lives with spouse, [facility], 1 threshold to enter.   Prior to admission, patients level of function was I with all functional mobility and ADLs.  Equipment used at home: none.  DME owned (not currently used): none.  Upon discharge, patient will have assistance from spouse.    Objective:     Communicated with nursing prior to session.  Patient found supine with (port)  upon PT entry to room.    "That's not my call, but no." - whether the pt thinks she needs PT services    General Precautions: Standard, NPO, respiratory, fall   Orthopedic Precautions:N/A   Braces: N/A     Exams:  · Cognitive Exam:  Patient is oriented to Person, Place, Time and Situation  · Fine Motor Coordination:    · -       Intact  Left hand thumb/finger opposition skills and Right hand thumb/finger opposition skills  · Gross Motor Coordination:  WFL  · Postural Exam:  Patient presented with the following abnormalities:    · -       No postural abnormalities identified  · Sensation:    · -       Intact  · Skin Integrity/Edema:      · -       Skin integrity: Visible skin intact  · -       Edema: None noted B LEs  · RUE ROM: WFL  · RUE Strength: WFL  · LUE ROM: WFL  · LUE Strength: WFL  · RLE ROM: WFL  · RLE Strength: WFL  · LLE ROM: WFL  · LLE Strength: WFL    Functional Mobility:  · Bed Mobility:     · Scooting: independence  · Supine to Sit: independence  · Sit to Supine: independence    · Transfers:     · Sit to Stand:  independence with no AD  · Bed to Chair: independence with IV pole  using  Stand Pivot  · Toilet Transfer: independence with  IV pole  using  Stand Pivot    · Gait: Pt amb 16', I, pushing IV pole.  Pt amb 368', S, without AD, 6LPM of supp O2 via NC, at the end of gait training pt required a chair to sit sec to 2 episodes of LOB to the R with Мария to recover, CARROLL and a glazed look in her face, noted L intoeing throughout    · Balance: S-Мария: dynamic standing balance without AD      Therapeutic Activities and " Exercises:   PT POC established with pt  Whiteboard updated    AM-PAC 6 CLICK MOBILITY  Total Score:21     Patient left supine with all lines intact, call button in reach and nursing present.    GOALS:   Multidisciplinary Problems     Physical Therapy Goals        Problem: Physical Therapy Goal    Goal Priority Disciplines Outcome Goal Variances Interventions   Physical Therapy Goal     PT, PT/OT Ongoing (interventions implemented as appropriate)     Description:  Goals to be met by: 19     Patient will increase functional independence with mobility by performin. Lower extremity exercise program x 20 reps per handout, with assistance as needed.  2. Pt to perf 10x sit<>stand: 25 sec, to demonstrate improvements in B LE mm strength.  3. Pt to perf 6MWT: amb 800', to demonstrate a clinically significant improvement in aerobic capacity.                          History:     Past Medical History:   Diagnosis Date    Arthritis     Blood transfusion     Bronchiolitis obliterans syndrome 2016    Cystic fibrosis of the lung     Ear infection     Hypertension     Migraine headache     MRSA (methicillin resistant Staphylococcus aureus) carrier     Osteopenia     Other specified disease of pancreas     Pain disorder     Seizures     Sinusitis, chronic     Vocal cord paralysis 2014    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       Past Surgical History:   Procedure Laterality Date    ABDOMINAL SURGERY      peg tube     AWDHFYGY-[id number]-IHKQZSEFURRE N/A 2015    [facility]    BIOPSY-BRONCHUS N/A 2016    Performed by Jake [facility]varez MD at [facility] OR 2ND [facility]    BRONCHOSCOPY Bilateral 2018    Performed by Francisca Morin DO at [facility] OR [facility]ND [facility]    BRONCHOSCOPY N/A 10/1/2018    Performed by Jake [facility] at [facility] OR [facility]    BRONCHOSCOPY Bilateral 2016    Performed by [facility] at [facility] OR [facility]    BRONCHOSCOPY - flexible  bronchoscopy with probable tissue biopsy CPT 60805 N/A 7/21/2015    Performed by Jake [facility]varez [facility] at [facility] OR 2ND [facility]    BRONCHOSCOPY - flexible bronchoscopy with probable tissue biospy CPT 44739 N/A 10/20/2015    Performed by Jake Alvarez MD at [facility] OR 2ND [facility]    BRONCHOSCOPY - flexible bronchoscopy with tissue biopsy CPT 37282 N/A 9/29/2015    Performed by Jake Alvarez MD at [facility] OR 2ND [facility]    BRONCHOSCOPY - flexible bronchoscopy with tissue biopsy CPT 07938 N/A 5/26/2015    Performed by Jake Alvarez MD at [facility] OR 1ST FLR    BRONCHOSCOPY flexible bronchoscopy with tissue biopsy N/A 9/8/2014    Performed by Jake Alvarez MD at [facility] OR 2ND [facility]LR    BRONCHOSCOPY flexible with possible tissue biopsy N/A 8/8/2014    Performed by Jake Alvarez MD at [facility] OR 2ND FLR    BRONCHOSCOPY, BAL, BIOPSIES N/A 3/20/2018    Performed by Jake Alvarez MD at [facility] OR 2ND FLR    BRONCHOSCOPY-FIBEROPTIC N/A 1/29/2016    Performed by Joann Cifuentes DO at [facility] OR 2ND FLR    BRONCHOSCOPY; Bronchoscopy with BAL and transbronchial biopsies under general anesthesia N/A 5/29/2018    Performed by Jake Alvarez MD at [facility] OR [address] [place] [address]    CHOLECYSTECTOMY  2016    CHOLECYSTECTOMY-LAPAROSCOPIC N/A 7/22/2016    Performed by Joshua Goldberg, MD at [facility] OR 2ND FLR    ENDOMETRIAL ABLATION  2015    KJB    FESS      FESS Bilateral 10/21/2013    Performed by Jared Whatley MD at [facility] OR [address] [place] [address]    FINE NEEDLE ASPIRATION (FNA)  of a cervical lymphadenopathy  1/29/2016    Performed by Joann Cifuentes DO at [facility] OR 2ND [facility]LR    flex bronchoscopy with probable tissue biopsy N/A 7/31/2014    Performed by [facility] Provider at [facility] OR 2ND FLR    flexible bronchoscopy with tissue biopsy N/A 12/11/2014    [facility]    HYSTERECTOMY  04/2017    [facility]    INJECTION-VOCAL CORD Left 6/24/2014    Performed by Jared Whatley MD at [facility] OR [address] [place] [address]     WRZQYRLUS-YQCQ-M-CATH to right chest and removal of portacath to left chests wall. Bilateral 6/24/2014    Performed by Zhen Dorado MD at [facility] OR [address] [place] [address]    LARYNX SURGERY  2016    LUNG TRANSPLANT Bilateral 6/12/14    MYRINGOTOMY W/ TUBES Right 04/2017    MYRINGOTOMY WITH INSERTION OF PE TUBES Right 4/15/2017    [facility]    PLACEMENT-TUBE-PEG  5/15/2014    Performed by David Moses MD at [facility] OR [address] [place] [address]    PORTACATH PLACEMENT Right     rt sc    REMOVAL-TUBE-PEG  5/15/2014    Performed by David Moses MD at [facility] OR [address] [place] [address]    RHC for lung re-transplant N/A 1/22/2019    Performed by Laura Rachel MD at [facility] CATH LAB    ROBOTIC ASSISTED LAPAROSCOPIC HYSTERECTOMY N/A 4/25/2017    Performed by Deny Dias Jr., MD at [facility] [place]    SALPINGECTOMY Bilateral 2015    KJB    SALPINGECTOMY-LAPAROSCOPIC Bilateral 5/19/2015    Performed by Deny Dias Jr[facility] MD at [facility] [place]    SINUS SURGERY FUNCTIONAL ENDOSCOPIC WITH NAVIGATION Bilateral 4/3/2017    Performed by Cesar Olsen MD at [facility] OR [address] [place] [address]    SINUS SURGERY FUNCTIONAL ENDOSCOPIC WITH NAVIGATION, 01379, 36115, 85585, 88674 Bilateral 2/5/2015    Performed by Cesar Olsen MD at [facility] OR [address] [place] [address]    THYROPLASTY - Medialization laryngoplasty, cricothyroid subluxation, arytenoid repositioning Left 8/2/2016    Performed by Gary Null MD at [facility] OR [address] [place] [address]    TRANSPLANT-LUNG Bilateral 6/11/2014    Performed by Adolfo Huston MD at [facility] OR [address] [place] [address]       Time Tracking:     PT Received On: 04/09/19  PT Start Time: 1007     PT Stop Time: 1030  PT Total Time (min): 23 min     Billable Minutes: Evaluation 8 and Gait Training 15      Alicia Jones, PT  04/09/2019

## 2019-04-09 NOTE — SUBJECTIVE & OBJECTIVE
Subjective:     Interval History: No acute events overnight. Remains with dark yellow sputum production and cough.  Remains on 4L NC with sats 92-98%. Tmax 100 this morning. Cefepime transitioned to meropenem yesterday based on sensitivities. Plan for sinus washout with ENT today or tomorrow.     Continuous Infusions:  Scheduled Meds:   calcium-vitamin D3  1 tablet Oral BID WM    dapsone  100 mg Oral Daily    enoxaparin  40 mg Subcutaneous Daily    fluticasone  2 spray Each Nare Daily    folic acid  1,000 mcg Oral Daily    gabapentin  300 mg Oral TID    levalbuterol  0.63 mg Nebulization TID    lipase-protease-amylase  4 capsule Oral TID WM    magnesium oxide  400 mg Oral BID    meropenem (MERREM) IVPB  1 g Intravenous Q8H    mometasone-formoterol  1 puff Inhalation BID    montelukast  10 mg Oral Daily    morphine  15 mg Oral BID    multivit-iron-FA-calcium-mins  1 tablet Oral BID    NON FORMULARY MEDICATION 4 capsule  4 capsule Oral TID WM    omeprazole  40 mg Oral BID    polyethylene glycol  17 g Oral BID    predniSONE  5 mg Oral Daily    QUEtiapine  25 mg Oral Daily    QUEtiapine  50 mg Oral QHS    tacrolimus  2.5 mg Oral BID    topiramate  25 mg Oral BID    vancomycin (VANCOCIN) IVPB  1,000 mg Intravenous Q12H    venlafaxine  150 mg Oral QHS    venlafaxine  37.5 mg Oral QHS     PRN Meds:acetaminophen, albuterol-ipratropium, butalbital-acetaminophen-caffeine -40 mg, diphenhydrAMINE, diphenhydrAMINE, hydrOXYzine HCl, LORazepam, magnesium sulfate IVPB **AND** magnesium sulfate IVPB, ondansetron, oxyCODONE, potassium chloride 10% **AND** potassium chloride 10% **AND** potassium chloride 10%, promethazine (PHENERGAN) IVPB, promethazine, tiZANidine    Review of patient's allergies indicates:   Allergen Reactions    Albuterol Palpitations    Colistin Anaphylaxis    Vancomycin analogues      Infusion reaction that does not resolve with slowing    Neupogen [filgrastim] Other (See  Comments)     Ostealgia after five daily doses of 300 mcg.      Bactrim [sulfamethoxazole-trimethoprim] Hives    Ceftazidime Hives     Pt stated can tolerate cefapine not ceftazidime    Ceftazidime     Dronabinol Other (See Comments)     Mental changes/hallucinations    Haldol [haloperidol lactate] Other (See Comments)     Seizure like activity    Nsaids (non-steroidal anti-inflammatory drug)      Cannot have due to lung transplant    Adhesive Rash     Cloth tape- please use tegaderm or paper tape    Aztreonam Rash    Ciprofloxacin Nausea And Vomiting     Projectile N/V, per patient.  Unwilling to retry therapy.       Review of Systems   Constitutional: Positive for activity change, appetite change and fatigue. Negative for chills and fever.   HENT: Positive for congestion, rhinorrhea, sinus pain and tinnitus. Negative for ear discharge, ear pain and sore throat.    Eyes: Negative for discharge and redness.   Respiratory: Positive for cough and shortness of breath. Negative for chest tightness and wheezing.    Cardiovascular: Negative for chest pain, palpitations and leg swelling.   Gastrointestinal: Positive for nausea. Negative for abdominal pain, constipation, diarrhea and vomiting.   Endocrine: Negative for polydipsia and polyuria.   Genitourinary: Negative for difficulty urinating, dysuria, frequency and urgency.   Musculoskeletal: Positive for myalgias. Negative for arthralgias, neck pain and neck stiffness.   Skin: Positive for pallor. Negative for rash.   Allergic/Immunologic: Positive for immunocompromised state.   Neurological: Positive for headaches. Negative for dizziness, weakness, light-headedness and numbness.   Hematological: Negative for adenopathy. Does not bruise/bleed easily.   Psychiatric/Behavioral: Negative for confusion, decreased concentration and hallucinations.     Objective:   Physical Exam   Constitutional: She is oriented to person, place, and time. She appears well-developed  and well-nourished. She has a sickly appearance. No distress. Nasal cannula in place.   HENT:   Head: Normocephalic and atraumatic.   Right Ear: External ear normal.   Left Ear: External ear normal.   Nose: Nose normal.   Mouth/Throat: Oropharynx is clear and moist. No oropharyngeal exudate.   Eyes: Conjunctivae and EOM are normal. Right eye exhibits no discharge. Left eye exhibits no discharge.   Neck: Normal range of motion. Neck supple. No JVD present.   Cardiovascular: Normal rate, regular rhythm and normal heart sounds. Exam reveals no friction rub.   No murmur heard.  Pulmonary/Chest: Effort normal. Tachypnea noted. She has no decreased breath sounds. She has no wheezes. She has no rhonchi. She has no rales.   Abdominal: Soft. Bowel sounds are normal. She exhibits distension (minimal). There is no tenderness.   Musculoskeletal: Normal range of motion. She exhibits no edema.   Lymphadenopathy:     She has no cervical adenopathy.   Neurological: She is alert and oriented to person, place, and time. No cranial nerve deficit.   Skin: Skin is warm and dry. Capillary refill takes less than 2 seconds. She is not diaphoretic. There is pallor.   Psychiatric: She has a normal mood and affect. Her behavior is normal. Judgment and thought content normal.   Nursing note and vitals reviewed.        Vital Signs (Most Recent):  Temp: 98.7 °F (37.1 °C) (04/09/19 1133)  Pulse: 92 (04/09/19 1133)  Resp: 16 (04/09/19 1133)  BP: 124/89 (04/09/19 1133)  SpO2: 97 % (04/09/19 1133) Vital Signs (24h Range):  Temp:  [98.4 °F (36.9 °C)-100 °F (37.8 °C)] 98.7 °F (37.1 °C)  Pulse:  [] 92  Resp:  [16-20] 16  SpO2:  [92 %-98 %] 97 %  BP: (120-140)/(76-89) 124/89     Weight: 69.5 kg (153 lb 3.5 oz)  Body mass index is 29.92 kg/m².      Intake/Output Summary (Last 24 hours) at 4/9/2019 1215  Last data filed at 4/8/2019 1735  Gross per 24 hour   Intake 1260 ml   Output --   Net 1260 ml       Significant Labs:  CBC:  Recent Labs   Lab  04/09/19 0447   WBC 5.69   RBC 3.28*   HGB 8.5*   HCT 29.7*   *   MCV 91   MCH 25.9*   MCHC 28.6*     BMP:  Recent Labs   Lab 04/09/19 0447      K 4.3      CO2 30*   BUN 9   CREATININE 0.8   CALCIUM 9.5      Tacrolimus Levels:  Recent Labs   Lab 04/09/19 0447   TACROLIMUS 5.2     Microbiology:  Microbiology Results (last 7 days)     Procedure Component Value Units Date/Time    Respiratory Viral Panel by PCR [place]; Nasal Swab [[id number]] Collected:  04/05/19 1429    Order Status:  Completed Specimen:  Respiratory Updated:  04/09/19 1204     Respiratory Virus Panel, source Nasal Swab     RVP - Adenovirus Not Detected     Comment: Detects Serotypes B and E. Detection of Serotype C may   be limited. If Adenovirus infection is suspected and a   Not Detected result is returned the sample should be   re-tested for Adenovirus using an independent method  (e.g. [employer] Adenovirus Quantitative Real-Time  PCR test.          Enterovirus Not Detected     Comment: Cross-reactivity has been observed between certain Rhinovirus  strains and the Enterovirus assay.          Human Bocavirus Not Detected     Human Coronavirus Not Detected     Comment: The Human Coronavirus assay detects Human coronavirus types  229E, OC43,NL63 and HKU1.          RVP - Human Metapneumovirus (hMPV) Not Detected     RVP - Influenza A Not Detected     Influenza A - T4T5-01 Not Detected     RVP - Influenza B Not Detected     Parainfluenza Not Detected     Respiratory Syncytial VirusVirus (RSV) A Not Detected     Comment: The Respiratory Syncytial Viral assay detects types A and B,  however it does not distinguish between the two.          RVP - Rhinovirus Not Detected     Comment: Cross-Reactivity has been observed between certain   Rhinovirus strains and the Enterovirus assay.  Target Enriched Mulitplex Polymerase Chain Reaction (TEM-PCR)  allows for the detection of multiple pathogens out of a single  reaction.  This test  was developed and its performance   characteristics determined by [employer].  It has not   been cleared or approved by the U.S.Food and Drug Administration.  Results should be used in conjunction with clinical findings,   and should not form the sole basis for a diagnosis or treatment  decision.  TEM-PCR is a licensed technology of [employer].         Narrative:       Receiving Lab:->[facility]    Blood culture x two cultures. Draw prior to antibiotics. [[id number]] Collected:  04/05/19 1400    Order Status:  Completed Specimen:  Blood from Line, Subclavian, Right Updated:  04/08/19 1612     Blood Culture, Routine No Growth to date     Blood Culture, Routine No Growth to date     Blood Culture, Routine No Growth to date     Blood Culture, Routine No Growth to date    Narrative:       Aerobic and anaerobic    Blood culture x two cultures. Draw prior to antibiotics. [[id number]] Collected:  04/05/19 1429    Order Status:  Completed Specimen:  Blood from Line, Subclavian, Right Updated:  04/08/19 1612     Blood Culture, Routine No Growth to date     Blood Culture, Routine No Growth to date     Blood Culture, Routine No Growth to date     Blood Culture, Routine No Growth to date    Narrative:       Aerobic and anaerobic    Culture, Respiratory with Gram Stain [[id number]]  (Susceptibility) Collected:  04/05/19 1429    Order Status:  Completed Specimen:  Respiratory from Sputum Updated:  04/08/19 0943     Respiratory Culture --     METHICILLIN RESISTANT STAPHYLOCOCCUS AUREUS  Many       Respiratory Culture --     PSEUDOMONAS AERUGINOSA  Moderate       Gram Stain (Respiratory) <10 epithelial cells per low power field.     Gram Stain (Respiratory) Many WBC's     Gram Stain (Respiratory) Many Gram positive cocci     Gram Stain (Respiratory) Rare Gram positive rods     Gram Stain (Respiratory) Rare yeast    Influenza A & B by Molecular [[id number]] Collected:  04/05/19 1429    Order Status:  Completed  Specimen:  Nasopharyngeal Swab Updated:  04/05/19 1521     Influenza A, Molecular Negative     Influenza B, Molecular Negative     Flu A & B Source Nasal swab          I have reviewed all pertinent labs within the past 24 hours.    Diagnostic Results:  Labs: Reviewed  X-Ray: Reviewed

## 2019-04-09 NOTE — PLAN OF CARE
Patient is warm and comfortable as verbalized-pain is controlled, denies PONV. Vital signs are stable and within normal limit. Surgical site is clean and intact. Patient is awake, alert and oriented x4. POC reviewed with patient.

## 2019-04-09 NOTE — PLAN OF CARE
Problem: Spiritual Distress Risk or Actual  Goal: Spiritual Wellbeing    Intervention: Promote Spiritual Wellbeing  Provided initial visit. Pt and pt's  present. Introduced and offered pastoral support with reflective listening. No further spiritual needs expressed at this time. Pt made aware of 's presence as needed, and requested f/u visit. Chaplains will continue to follow.

## 2019-04-09 NOTE — ANESTHESIA PREPROCEDURE EVALUATION
04/09/2019  Juanita Ibarra is a 29 y.o., female.    Anesthesia Evaluation         Review of Systems  Anesthesia Hx:  Hx of Anesthetic complications PONV  Difficult to sedate per patient   Cardiovascular:   Exercise tolerance: poor Hypertension   Pulmonary:   Pneumonia Shortness of breath Cystic fibrosis   Hepatic/GI:   nausea   Neurological:   Headaches Seizures    Endocrine:   Diabetes    Psych:   Psychiatric History anxiety          Physical Exam  General:  Well nourished    Airway/Jaw/Neck:  Airway Findings: Mouth Opening: Normal Tongue: Normal  General Airway Assessment: Adult  Mallampati: II  TM Distance: Normal, at least 6 cm       Chest/Lungs:  Chest/Lungs Findings: Rhonchi, Decreased Breath Sounds Bilateral     Heart/Vascular:  Heart Findings: Normal            Anesthesia Plan  Type of Anesthesia, risks & benefits discussed:  Anesthesia Type:  general  Patient's Preference:   Intra-op Monitoring Plan: standard ASA monitors  Intra-op Monitoring Plan Comments:   Post Op Pain Control Plan: multimodal analgesia and IV/PO Opioids PRN  Post Op Pain Control Plan Comments: Opioids and analgesic adjuvants as needed  Regional blocks if applicable/indicated  Induction:   IV  Beta Blocker:  Patient is not currently on a Beta-Blocker (No further documentation required).       Informed Consent: Patient understands risks and agrees with Anesthesia plan.  Questions answered. Anesthesia consent signed with patient.  ASA Score: 4     Day of Surgery Review of History & Physical:    H&P update referred to the surgeon.     Anesthesia Plan Notes: I have personally evaluated the patient and discussed risk/benefits/alternatives of general anesthesia.  Discussed timing of surgery with Lung transplant team and Dr. Burks.  They feel as if patient needs to undergo the procedure despite her increased o2 requirements,  as this is often the source of the infection for CF patients.  They also stated this needed to be performed prior to her getting re-listed for lung transplant. Discussed possibility of remaining intubated with Ms. Ibarra if unable to extubate at end of procedure. Also discussed increased morbidity and mortality given lung disease.  Pt aware and wishes to proceed.         Ready For Surgery From Anesthesia Perspective.

## 2019-04-09 NOTE — ASSESSMENT & PLAN NOTE
Continue Flonase and Singulair. Followed by Dr. Olsen outpatient. ENT consulted, appreciate recs. CT sinuses overnight with worsening sinus disease. To OR today for sinus washout.

## 2019-04-09 NOTE — ASSESSMENT & PLAN NOTE
29yoF presenting with acute exacerbation of chronic rhinosinusitis. Sinus cultures Jan '19 + for pseudomonas.     -needs sinus washout in OR. Will tentatively plan for this afternoon, pending availability of OR. Will examine ears while under anesthesia, possibly replace PE tubes  -NPO for now    Continue sinus regimen:   - Nasal saline rinses BID  - Nasal saline spray TID  - Flonase    Will defer indication for IV abx to primary team. Cefepime and vancomycin likely to provide adequate sinus coverage.

## 2019-04-09 NOTE — PROGRESS NOTES
[facility]  Otorhinolaryngology-Head & Neck Surgery  Progress Note    Subjective:     Post-Op Info:  Procedure(s) (LRB):  FESS, USING COMPUTER-ASSISTED NAVIGATION (N/A)  Exam under anesthesia of bilateral ears (Bilateral)      Hospital Day: 5     Interval History: CT of sinuses performed. Demonstrates worsening sinus disease from last scan done in 1/2019. Symptoms unchanged.    Medications:  Continuous Infusions:  Scheduled Meds:   calcium-vitamin D3  1 tablet Oral BID WM    dapsone  100 mg Oral Daily    enoxaparin  40 mg Subcutaneous Daily    fluticasone  2 spray Each Nare Daily    folic acid  1,000 mcg Oral Daily    gabapentin  300 mg Oral TID    levalbuterol  1.25 mg Nebulization TID    lipase-protease-amylase  4 capsule Oral TID WM    magnesium oxide  400 mg Oral BID    meropenem (MERREM) IVPB  1 g Intravenous Q8H    mometasone-formoterol  1 puff Inhalation BID    montelukast  10 mg Oral Daily    morphine  15 mg Oral BID    multivit-iron-FA-calcium-mins  1 tablet Oral BID    NON FORMULARY MEDICATION 4 capsule  4 capsule Oral TID WM    omeprazole  40 mg Oral BID    polyethylene glycol  17 g Oral BID    predniSONE  5 mg Oral Daily    QUEtiapine  25 mg Oral Daily    QUEtiapine  50 mg Oral QHS    tacrolimus  2.5 mg Oral BID    topiramate  25 mg Oral BID    vancomycin (VANCOCIN) IVPB  1,000 mg Intravenous Q12H    venlafaxine  150 mg Oral QHS    venlafaxine  37.5 mg Oral QHS     PRN Meds:acetaminophen, albuterol-ipratropium, butalbital-acetaminophen-caffeine -40 mg, diphenhydrAMINE, diphenhydrAMINE, hydrOXYzine HCl, LORazepam, magnesium sulfate IVPB **AND** magnesium sulfate IVPB, ondansetron, oxyCODONE, potassium chloride 10% **AND** potassium chloride 10% **AND** potassium chloride 10%, promethazine (PHENERGAN) IVPB, promethazine, tiZANidine     Review of patient's allergies indicates:   Allergen Reactions    Albuterol Palpitations    Colistin Anaphylaxis     Vancomycin analogues      Infusion reaction that does not resolve with slowing    Neupogen [filgrastim] Other (See Comments)     Ostealgia after five daily doses of 300 mcg.      Bactrim [sulfamethoxazole-trimethoprim] Hives    Ceftazidime Hives     Pt stated can tolerate cefapine not ceftazidime    Ceftazidime     Dronabinol Other (See Comments)     Mental changes/hallucinations    Haldol [haloperidol lactate] Other (See Comments)     Seizure like activity    Nsaids (non-steroidal anti-inflammatory drug)      Cannot have due to lung transplant    Adhesive Rash     Cloth tape- please use tegaderm or paper tape    Aztreonam Rash    Ciprofloxacin Nausea And Vomiting     Projectile N/V, per patient.  Unwilling to retry therapy.     Objective:     Vital Signs (24h Range):  Temp:  [98.5 °F (36.9 °C)-100 °F (37.8 °C)] 100 °F (37.8 °C)  Pulse:  [] 93  Resp:  [16-20] 18  SpO2:  [92 %-99 %] 98 %  BP: (120-140)/(76-96) 120/81       Lines/Drains/Airways     Central Venous Catheter Line                 Port A Cath Single Lumen 04/05/19 1400 right subclavian 3 days                Physical Exam    Awake, Alert and Oriented. NAD, [id number]  Pupils equal, round. EOMI, no proptosis  Vision grossly intact, Hearing grossly intact   Face symmetric, non-edematous  Neck is symmetric  Normal work of breathing, no stridor  Good phonation      Significant Labs:  All pertinent labs from the last 24 hours have been reviewed.    Significant Diagnostics:  I have reviewed all pertinent imaging results/findings within the past 24 hours. 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 Sinus cultures Jan '19 + for pseudomonas.     -needs sinus washout in OR. Will tentatively  plan for this afternoon, pending availability of OR. Will examine ears while under anesthesia, possibly replace PE tubes  -NPO for now    Continue sinus regimen:   - Nasal saline rinses BID  - Nasal saline spray TID  - Flonase    Will defer indication for IV abx to primary team. Cefepime and vancomycin likely to provide adequate sinus coverage.                May Heath MD  Otorhinolaryngology-Head & Neck Surgery  [facility]

## 2019-04-09 NOTE — OP NOTE
DATE OF PROCEDURE: 04/09/2019      PREOPERATIVE DIAGNOSES:  1. Chronic rhinosinusitis.  2. Cystic fibrosis.    POSTOPERATIVE DIAGNOSES:  1. Chronic rhinosinusitis.  2. Cystic fibrosis    PROCEDURES PERFORMED:  1. [employer] FESS CPT 50238  2. Bilateral maxillary antrostomy with removal of contents. CPT 51488-19  3. Bilateral total ethmoidectomy and frontal sinusotomy with removal of contents. CPT 90496-70  4. Bilateral sphenoidotomy with removal of contents CPT 78423-93    SURGEON: ROXY Burks III, M.D.    ASSISTANT SURGEON: May Heath MD (RES)      ANESTHESIA: General anesthesia with endotracheal intubation.    ESTIMATED BLOOD LOSS: 30    INDICATION FOR PROCEDURE: The patient is a 29 y.o.  female with history of cystic fibrosis. She has had sinus surgery 3 times, last in 2017. She was admitted to the lung transplant surgery service for suspected pulmonary infection, and reported worsening sinus symptoms in recent weeks. CT   sinus performed, which was consistent with worsening mucosal thickening of the bilateral ethmoids as well as bilateral maxillary sinuses since her last CT scan done in January 2019. Given her likelihood of requiring lung transplantation in the near future, ENT was consulted for urgent surgical management. The patient was consented for a functional endoscopic sinus surgery to be done inpatient.    PROCEDURE IN DETAIL: After the appropriate consents were obtained, the patient was brought to the Operating Room. She was positioned supine on the operating room table. After successful endotracheal intubation and general anesthesia was initiated, the patient was then rotated 180 degrees away from anesthesia. The patient was then prepped and draped in the usual fashion. A complete timeout was then held with the surgical nursing and Anesthesia teams.  The right nasal cavity was examined with the 0-degree endoscope. Significant crusting and purulent material was identified.  This was  collected and sent for culture. While the bony partitions of the ethmoid cavity had been removed in prior surgery, there was an overgrowth of polypoid mucosa. The diseased mucosa of the ethmoids was debrided using a powered   microdebrider. This was carried to the face of the sphenoid sinus. The sphenoid os was identified and widened with the microdebrider.  The frontal ethmoid recess was explored using curved   suction under stealth image guidance. A large polyp was removed ont the right hand side. Her frontal sinuses were hypoplastic. The recess was debrided with the microdebrider. The natural maxillary ostia was then identified. This was then entered with a curved suction under stealth image guidance.  Maxillary ostia was then widened using powered microdebrider.  Attention was then turned towards the leftnasal cavity. In a similar fashion,  the right ethmoid contents were then removed with the powered microdebrider. The sphenoid os was identified and cleared of polypoid mucosa.  The frontal ethmoid recess was explored using curved   suction under stealth image guidance.  Again, the   maxillary ostia was identified and entered with a curved suction. The maxillary  contents further removed with a powered microdebrider.  Next the hydrodebrider was used to thoroughly irrigate all sinuses bilaterally. A total of 2.5 liters of irrigation was used.  Next, drug-eluting stents were placed in the bilateral ethmoid cavities.  Finally, steph hemostatic agent was applied bilaterally. The stomach was suctioned. At this point, the patient was turned back towards   Anesthesia. She was successfully awakened in the Operating Room. The patient was then transferred to Postanesthesia Care Unit in stable condition.    DISPOSITION: Transferred to Postanesthesia Care Unit.  COMPLICATIONS: None.  Dr. Burks was present and participated in the entirety of the case.

## 2019-04-10 NOTE — PROGRESS NOTES
Patient c/o facial pain unrelieved by oxycodone after sinus washout. She is requesting dilaudid, which ENT had OK'd, but wanted primary team to order. I notified Dr. Morin, who approved a one time extra dose of oxycodone 10mg. Order placed.

## 2019-04-10 NOTE — PLAN OF CARE
Problem: Adult Inpatient Plan of Care  Goal: Plan of Care Review  Pt AAOx4, VSS, afebrile.  C/o pain to head area throughout shift.  Pt upset with current pain regimen and requiring 1 dose 0.2mg IV dilaudid with 1 time extra dose 10mg oxycodone.  Pt up and ambulatory independently.  AM mag level 1.3.  Administered PRN magnesium IV per order.  MD Castro notified and aware of replacement.  Fall risk precautions initiated.  Pt in lowest bed position setting, lighting adjusted, pt to wear nonskid socks when ambulating, side rails up x2.  Pt remain free from falls during shift.  Pt verbalize understanding to call when needed assistance. Call light within reach.  Will continue to monitor.

## 2019-04-10 NOTE — PHYSICIAN QUERY
PT Name: Juanita Ibarra  MR #: [id number]    Physician Query Form - Cause and Effect Relationship Clarification      CDS/: Crystal Baker               Contact information:[email or web address]    This form is a permanent document in the medical record.     Query Date: April 10, 2019    By submitting this query, we are merely seeking further clarification of documentation. Please utilize your independent clinical judgment when addressing the question(s) below.    The Medical record contains the following:  Supporting Clinical Findings   Location in record                                                                      Staphylococcus aureus pneumonia    Blood cultures with NGTD. Previously with Coronavirus in 02/2019.  History of prior MRSA colonization.         Will continue empiric Vanc/Cef and follow up on culture sensitivities. Continue scheduled nebs with CPT.                                                                                                                        Lung transplant pn 4-8    Recent sinus cultures + for pseudomonas                                                                                                                                                                                             Otorhinolaryngology-Head & Neck Surgery   Cn 4-6         Provider, please clarify if there is any correlation between Pneumoniae and Pseudomonas.           Are the conditions:      [  ] Due to or associated with each other   [  ] Unrelated to each other   [  ] Other (Please Specify): _________________________   [ x ] Clinically Undetermined

## 2019-04-10 NOTE — ASSESSMENT & PLAN NOTE
29yoF presenting with acute exacerbation of chronic rhinosinusitis. Sinus cultures Jan '19 + for pseudomonas.     -please provide pain medication for postop pain. Patient takes oxycodone at home, ok to try this, otherwise may need morphine or dilaudid for breakthough as patient has high tolerance  -plan to follow up with Dr. Burks in 1 week. If patient is still inpatient will see then, otherwise will need clinic visit.  Continue sinus regimen:   - Nasal saline rinses BID  - Nasal saline spray TID  - Flonase      -will attempt to bring to otology clinic today to examine ears

## 2019-04-10 NOTE — CONSULTS
[facility]  Adult Nutrition  Consult Note    SUMMARY     Recommendations    Recommendation/Intervention: 1.) Continue regular diet.   Goals: 1.) Pt to continue meeting % of EEN and EPN.   Nutrition Goal Status: new  Communication of RD Recs: (POC)    Reason for Assessment    Reason For Assessment: length of stay  Diagnosis: transplant/postoperative complications(s/p OLTx 14. On Lung Transplant list)  Relevant Medical History: CF, osteopenia, HTN, seizures  Interdisciplinary Rounds: did not attend  General Information Comments: Pt sitting up with RN to bedside. Pt reports feeling ok. She c/o nausea and diarrhea, no other GI distress. Attribute diarrhea to antibiotics. Attribute nausea to pain medications. Pt consuming ~100% of meals. Observed multiple snacks to bedside. Encouraged PO intake of meals.     Nutrition Risk Screen    Nutrition Risk Screen: no indicators present    Nutrition/Diet History    Spiritual, Cultural Beliefs, Taoist Practices, Values that Affect Care: no    Anthropometrics    Temp: 98.3 °F (36.8 °C)  Height Method: Stated  Height: 5' (152.4 cm)  Height (inches): 60 in  Weight Method: Bed Scale  Weight: 69.5 kg (153 lb 3.5 oz)  Weight (lb): 153.22 lb  Ideal Body Weight (IBW), Female: 100 lb  % Ideal Body Weight, Female (lb): 120 lb  BMI (Calculated): 23.5  Usual Body Weight (UBW), k.5 kg(per pt)  % Usual Body Weight: 127.79       Lab/Procedures/Meds    Pertinent Labs Reviewed: reviewed  Pertinent Labs Comments: Glu 155  Pertinent Medications Reviewed: reviewed  Pertinent Medications Comments: calcium-vitamin D, folic acid, lipase-protease-amylase, magnesium oxide, morphine, MVI, protonix, tacrolimus, vancomycin; PRN: oxycodone      Estimated/Assessed Needs    Weight Used For Calorie Calculations: 69.5 kg (153 lb 3.5 oz)  Energy Calorie Requirements (kcal): 8962-8005  Energy Need Method: Kcal/kg(25-30 kcal/kg)  Protein Requirements: 84-98(g/day)  Weight Used  For Protein Calculations: 69.5 kg (153 lb 3.5 oz)(1.2-1.4 g/kg)  Fluid Requirements (mL): 1mL/kcal or per MD  Estimated Fluid Requirement Method: RDA Method  RDA Method (mL): 1738         Nutrition Prescription Ordered    Current Diet Order: regular    Evaluation of Received Nutrient/Fluid Intake    I/O: +3.8L since admit  Comments: LBM 4/8  % Intake of Estimated Energy Needs: 75 - 100 %  % Meal Intake: 50 - 75 %    Nutrition Risk    Level of Risk/Frequency of Follow-up: low     Assessment and Plan  No nutritional diagnosis at this time.        Monitor and Evaluation    Food and Nutrient Intake: energy intake, food and beverage intake  Food and Nutrient Adminstration: diet order  Knowledge/Beliefs/Attitudes: food and nutrition knowledge/skill  Physical Activity and Function: nutrition-related ADLs and IADLs  Anthropometric Measurements: weight, weight change, body mass index  Biochemical Data, Medical Tests and Procedures: electrolyte and renal panel, glucose/endocrine profile, gastrointestinal profile, lipid profile  Nutrition-Focused Physical Findings: overall appearance     Malnutrition Assessment                 Orbital Region (Subcutaneous Fat Loss): well nourished  Upper Arm Region (Subcutaneous Fat Loss): well nourished  Thoracic and Lumbar Region: well nourished   Mormonism Region (Muscle Loss): well nourished  Clavicle Bone Region (Muscle Loss): well nourished  Dorsal Hand (Muscle Loss): mild depletion  Anterior Thigh Region (Muscle Loss): mild depletion  Posterior Calf Region (Muscle Loss): mild depletion   Edema (Fluid Accumulation): 1-->trace   Subcutaneous Fat Loss (Final Summary): well nourished  Muscle Loss Evaluation (Final Summary): mild protein-calorie malnutrition         Nutrition Follow-Up    RD Follow-up?: Yes

## 2019-04-10 NOTE — ASSESSMENT & PLAN NOTE
Continue Flonase and Singulair. Followed by Dr. Olsen outpatient and underwent OR washout 4/9. Continue sinus rinses. ENT following, appreciate recs. Dilaudid prn.

## 2019-04-10 NOTE — SUBJECTIVE & OBJECTIVE
Subjective:     Interval History: No acute events overnight. Cough improving. Sinus pain and headaches poorly controlled with PO meds. Had lengthy discussion with patient and team regarding importance of avoiding IV narcotics during this hospital admission and while moving forward with re-transplant workup. Patient verbalized understanding and became very tearful during rounds requesting pain relief for today following sinus surgery. She reports some increased abdominal distension and fewer, small BMs. Continues to have poor appetite.     Continuous Infusions:  Scheduled Meds:   calcium-vitamin D3  1 tablet Oral BID WM    dapsone  100 mg Oral Daily    enoxaparin  40 mg Subcutaneous Daily    fluticasone  2 spray Each Nare Daily    folic acid  1,000 mcg Oral Daily    gabapentin  300 mg Oral TID    lipase-protease-amylase  4 capsule Oral TID WM    magnesium oxide  400 mg Oral BID    meropenem (MERREM) IVPB  1 g Intravenous Q8H    mometasone-formoterol  1 puff Inhalation BID    montelukast  10 mg Oral Daily    morphine  15 mg Oral BID    multivit-iron-FA-calcium-mins  1 tablet Oral BID    omeprazole  40 mg Oral BID    polyethylene glycol  17 g Oral BID    predniSONE  5 mg Oral Daily    QUEtiapine  25 mg Oral Daily    QUEtiapine  50 mg Oral QHS    sodium chloride  1 spray Each Nare TID    tacrolimus  2.5 mg Oral BID    topiramate  25 mg Oral BID    vancomycin (VANCOCIN) IVPB  1,000 mg Intravenous Q12H    venlafaxine  150 mg Oral QHS    venlafaxine  37.5 mg Oral QHS     PRN Meds:acetaminophen, butalbital-acetaminophen-caffeine -40 mg, diphenhydrAMINE, diphenhydrAMINE, HYDROmorphone, hydrOXYzine HCl, levalbuterol, LORazepam, magnesium sulfate IVPB **AND** magnesium sulfate IVPB, ondansetron, oxyCODONE, potassium chloride 10% **AND** potassium chloride 10% **AND** potassium chloride 10%, promethazine (PHENERGAN) IVPB, promethazine, sodium chloride 0.9%, tiZANidine    Review of patient's  allergies indicates:   Allergen Reactions    Albuterol Palpitations    Colistin Anaphylaxis    Vancomycin analogues      Infusion reaction that does not resolve with slowing    Neupogen [filgrastim] Other (See Comments)     Ostealgia after five daily doses of 300 mcg.      Bactrim [sulfamethoxazole-trimethoprim] Hives    Ceftazidime Hives     Pt stated can tolerate cefapine not ceftazidime    Ceftazidime     Dronabinol Other (See Comments)     Mental changes/hallucinations    Haldol [haloperidol lactate] Other (See Comments)     Seizure like activity    Nsaids (non-steroidal anti-inflammatory drug)      Cannot have due to lung transplant    Adhesive Rash     Cloth tape- please use tegaderm or paper tape    Aztreonam Rash    Ciprofloxacin Nausea And Vomiting     Projectile N/V, per patient.  Unwilling to retry therapy.       Review of Systems   Constitutional: Positive for activity change, appetite change and fatigue. Negative for chills and fever.   HENT: Positive for congestion, rhinorrhea, sinus pain and tinnitus. Negative for ear discharge, ear pain and sore throat.    Eyes: Negative for discharge and redness.   Respiratory: Positive for cough and shortness of breath. Negative for chest tightness and wheezing.    Cardiovascular: Negative for chest pain, palpitations and leg swelling.   Gastrointestinal: Positive for abdominal distention, abdominal pain and nausea. Negative for constipation, diarrhea and vomiting.   Endocrine: Negative for polydipsia and polyuria.   Genitourinary: Negative for difficulty urinating, dysuria, frequency and urgency.   Musculoskeletal: Positive for myalgias. Negative for arthralgias, neck pain and neck stiffness.   Skin: Positive for pallor. Negative for rash.   Allergic/Immunologic: Positive for immunocompromised state.   Neurological: Positive for headaches. Negative for dizziness, weakness, light-headedness and numbness.   Hematological: Negative for adenopathy. Does  not bruise/bleed easily.   Psychiatric/Behavioral: Negative for confusion, decreased concentration and hallucinations.     Objective:   Physical Exam   Constitutional: She is oriented to person, place, and time. She appears well-developed and well-nourished. She has a sickly appearance. No distress. Nasal cannula in place.   HENT:   Head: Normocephalic and atraumatic.   Right Ear: External ear normal.   Left Ear: External ear normal.   Nose: Nose normal.   Mouth/Throat: Oropharynx is clear and moist. No oropharyngeal exudate.   Eyes: Conjunctivae and EOM are normal. Right eye exhibits no discharge. Left eye exhibits no discharge.   Neck: Normal range of motion. Neck supple. No JVD present.   Cardiovascular: Normal rate, regular rhythm and normal heart sounds. Exam reveals no friction rub.   No murmur heard.  Pulmonary/Chest: Effort normal. Tachypnea noted. She has no decreased breath sounds. She has no wheezes. She has no rhonchi. She has no rales.   Abdominal: Soft. Bowel sounds are normal. She exhibits distension (minimal). There is no tenderness.   Musculoskeletal: Normal range of motion. She exhibits no edema.   Lymphadenopathy:     She has no cervical adenopathy.   Neurological: She is alert and oriented to person, place, and time. No cranial nerve deficit.   Skin: Skin is warm and dry. Capillary refill takes less than 2 seconds. She is not diaphoretic. There is pallor.   Psychiatric: She has a normal mood and affect. Her behavior is normal. Judgment and thought content normal.   Nursing note and vitals reviewed.        Vital Signs (Most Recent):  Temp: 98.3 °F (36.8 °C) (04/10/19 1153)  Pulse: 90 (04/10/19 1153)  Resp: 20 (04/10/19 1153)  BP: (!) 133/90 (04/10/19 1153)  SpO2: 96 % (04/10/19 1153) Vital Signs (24h Range):  Temp:  [97.9 °F (36.6 °C)-98.6 °F (37 °C)] 98.3 °F (36.8 °C)  Pulse:  [] 90  Resp:  [16-31] 20  SpO2:  [95 %-99 %] 96 %  BP: (106-145)/(62-95) 133/90     Weight: 69.5 kg (153 lb 3.5  oz)  Body mass index is 29.92 kg/m².      Intake/Output Summary (Last 24 hours) at 4/10/2019 1248  Last data filed at 4/10/2019 0600  Gross per 24 hour   Intake 1760 ml   Output --   Net 1760 ml       Significant Labs:  CBC:  Recent Labs   Lab 04/10/19  0606   WBC 8.88   RBC 3.01*   HGB 7.7*   HCT 27.0*   *   MCV 90   MCH 25.6*   MCHC 28.5*     BMP:  Recent Labs   Lab 04/10/19  0606      K 4.3      CO2 31*   BUN 16   CREATININE 0.9   CALCIUM 8.9      Tacrolimus Levels:  Recent Labs   Lab 04/10/19  0606   TACROLIMUS 4.5*     Microbiology:  Microbiology Results (last 7 days)     Procedure Component Value Units Date/Time    Culture, Anaerobic [[id number]] Collected:  04/09/19 1401    Order Status:  Completed Specimen:  Sinus from Nares, Right Updated:  04/10/19 0745     Anaerobic Culture Culture in progress    Narrative:       Right Maxillary and Ethmoid sinus    Blood culture x two cultures. Draw prior to antibiotics. [[id number]] Collected:  04/05/19 1400    Order Status:  Completed Specimen:  Blood from Line, Subclavian, Right Updated:  04/09/19 1612     Blood Culture, Routine No Growth to date     Blood Culture, Routine No Growth to date     Blood Culture, Routine No Growth to date     Blood Culture, Routine No Growth to date     Blood Culture, Routine No Growth to date    Narrative:       Aerobic and anaerobic    Blood culture x two cultures. Draw prior to antibiotics. [[id number]] Collected:  04/05/19 1429    Order Status:  Completed Specimen:  Blood from Line, Subclavian, Right Updated:  04/09/19 1612     Blood Culture, Routine No Growth to date     Blood Culture, Routine No Growth to date     Blood Culture, Routine No Growth to date     Blood Culture, Routine No Growth to date     Blood Culture, Routine No Growth to date    Narrative:       Aerobic and anaerobic    Aerobic culture [[id number]] Collected:  04/09/19 1401    Order Status:  Sent Specimen:  Sinus from Nares, Right Updated:  04/09/19  1417    Fungus culture [[id number]] Collected:  04/09/19 1401    Order Status:  Sent Specimen:  Sinus from Nares, Right Updated:  04/09/19 1417    AFB Culture & Smear [[id number]] Collected:  04/09/19 1401    Order Status:  Sent Specimen:  Sinus from Nares, Right Updated:  04/09/19 1417    Respiratory Viral Panel by PCR [facility]; Nasal Swab [[id number]] Collected:  04/05/19 1429    Order Status:  Completed Specimen:  Respiratory Updated:  04/09/19 1204     Respiratory Virus Panel, source Nasal Swab     RVP - Adenovirus Not Detected     Comment: Detects Serotypes B and E. Detection of Serotype C may   be limited. If Adenovirus infection is suspected and a   Not Detected result is returned the sample should be   re-tested for Adenovirus using an independent method  (e.g. [employer] Adenovirus Quantitative Real-Time  PCR test.          Enterovirus Not Detected     Comment: Cross-reactivity has been observed between certain Rhinovirus  strains and the Enterovirus assay.          Human Bocavirus Not Detected     Human Coronavirus Not Detected     Comment: The Human Coronavirus assay detects Human coronavirus types  229E, OC43,NL63 and HKU1.          RVP - Human Metapneumovirus (hMPV) Not Detected     RVP - Influenza A Not Detected     Influenza A - J6R1-48 Not Detected     RVP - Influenza B Not Detected     Parainfluenza Not Detected     Respiratory Syncytial VirusVirus (RSV) A Not Detected     Comment: The Respiratory Syncytial Viral assay detects types A and B,  however it does not distinguish between the two.          RVP - Rhinovirus Not Detected     Comment: Cross-Reactivity has been observed between certain   Rhinovirus strains and the Enterovirus assay.  Target Enriched Mulitplex Polymerase Chain Reaction (TEM-PCR)  allows for the detection of multiple pathogens out of a single  reaction.  This test was developed and its performance   characteristics determined by [employer].  It has not   been cleared or  approved by the U.S.Food and Drug Administration.  Results should be used in conjunction with clinical findings,   and should not form the sole basis for a diagnosis or treatment  decision.  TEM-PCR is a licensed technology of [employer].         Narrative:       Receiving Lab:->[facility]    Culture, Respiratory with Gram Stain [[id number]]  (Susceptibility) Collected:  04/05/19 1429    Order Status:  Completed Specimen:  Respiratory from Sputum Updated:  04/08/19 0943     Respiratory Culture --     METHICILLIN RESISTANT STAPHYLOCOCCUS AUREUS  Many       Respiratory Culture --     PSEUDOMONAS AERUGINOSA  Moderate       Gram Stain (Respiratory) <10 epithelial cells per low power field.     Gram Stain (Respiratory) Many WBC's     Gram Stain (Respiratory) Many Gram positive cocci     Gram Stain (Respiratory) Rare Gram positive rods     Gram Stain (Respiratory) Rare yeast    Influenza A & B by Molecular [[id number]] Collected:  04/05/19 1429    Order Status:  Completed Specimen:  Nasopharyngeal Swab Updated:  04/05/19 1521     Influenza A, Molecular Negative     Influenza B, Molecular Negative     Flu A & B Source Nasal swab          I have reviewed all pertinent labs within the past 24 hours.    Diagnostic Results:  Labs: Reviewed  X-Ray: Reviewed

## 2019-04-10 NOTE — PROGRESS NOTES
[facility]  Lung Transplant  Progress Note - Floor    Patient Name: Juanita Ibarra  MRN: [id number]  Admission Date: 4/5/2019  Hospital Length of Stay: 5 days  Post-Operative Day: 1763  Attending Physician: Francisca Morin DO  Primary Care Provider: Primary Doctor No     Subjective:     Interval History: No acute events overnight. Cough improving. Sinus pain and headaches poorly controlled with PO meds. Had lengthy discussion with patient and team regarding importance of avoiding IV narcotics during this hospital admission and while moving forward with re-transplant workup. Patient verbalized understanding and became very tearful during rounds requesting pain relief for today following sinus surgery. She reports some increased abdominal distension and fewer, small BMs. Continues to have poor appetite.     Continuous Infusions:  Scheduled Meds:   calcium-vitamin D3  1 tablet Oral BID WM    dapsone  100 mg Oral Daily    enoxaparin  40 mg Subcutaneous Daily    fluticasone  2 spray Each Nare Daily    folic acid  1,000 mcg Oral Daily    gabapentin  300 mg Oral TID    lipase-protease-amylase  4 capsule Oral TID WM    magnesium oxide  400 mg Oral BID    meropenem (MERREM) IVPB  1 g Intravenous Q8H    mometasone-formoterol  1 puff Inhalation BID    montelukast  10 mg Oral Daily    morphine  15 mg Oral BID    multivit-iron-FA-calcium-mins  1 tablet Oral BID    omeprazole  40 mg Oral BID    polyethylene glycol  17 g Oral BID    predniSONE  5 mg Oral Daily    QUEtiapine  25 mg Oral Daily    QUEtiapine  50 mg Oral QHS    sodium chloride  1 spray Each Nare TID    tacrolimus  2.5 mg Oral BID    topiramate  25 mg Oral BID    vancomycin (VANCOCIN) IVPB  1,000 mg Intravenous Q12H    venlafaxine  150 mg Oral QHS    venlafaxine  37.5 mg Oral QHS     PRN Meds:acetaminophen, butalbital-acetaminophen-caffeine -40 mg, diphenhydrAMINE, diphenhydrAMINE, HYDROmorphone,  hydrOXYzine HCl, levalbuterol, LORazepam, magnesium sulfate IVPB **AND** magnesium sulfate IVPB, ondansetron, oxyCODONE, potassium chloride 10% **AND** potassium chloride 10% **AND** potassium chloride 10%, promethazine (PHENERGAN) IVPB, promethazine, sodium chloride 0.9%, tiZANidine    Review of patient's allergies indicates:   Allergen Reactions    Albuterol Palpitations    Colistin Anaphylaxis    Vancomycin analogues      Infusion reaction that does not resolve with slowing    Neupogen [filgrastim] Other (See Comments)     Ostealgia after five daily doses of 300 mcg.      Bactrim [sulfamethoxazole-trimethoprim] Hives    Ceftazidime Hives     Pt stated can tolerate cefapine not ceftazidime    Ceftazidime     Dronabinol Other (See Comments)     Mental changes/hallucinations    Haldol [haloperidol lactate] Other (See Comments)     Seizure like activity    Nsaids (non-steroidal anti-inflammatory drug)      Cannot have due to lung transplant    Adhesive Rash     Cloth tape- please use tegaderm or paper tape    Aztreonam Rash    Ciprofloxacin Nausea And Vomiting     Projectile N/V, per patient.  Unwilling to retry therapy.       Review of Systems   Constitutional: Positive for activity change, appetite change and fatigue. Negative for chills and fever.   HENT: Positive for congestion, rhinorrhea, sinus pain and tinnitus. Negative for ear discharge, ear pain and sore throat.    Eyes: Negative for discharge and redness.   Respiratory: Positive for cough and shortness of breath. Negative for chest tightness and wheezing.    Cardiovascular: Negative for chest pain, palpitations and leg swelling.   Gastrointestinal: Positive for abdominal distention, abdominal pain and nausea. Negative for constipation, diarrhea and vomiting.   Endocrine: Negative for polydipsia and polyuria.   Genitourinary: Negative for difficulty urinating, dysuria, frequency and urgency.   Musculoskeletal: Positive for myalgias. Negative  for arthralgias, neck pain and neck stiffness.   Skin: Positive for pallor. Negative for rash.   Allergic/Immunologic: Positive for immunocompromised state.   Neurological: Positive for headaches. Negative for dizziness, weakness, light-headedness and numbness.   Hematological: Negative for adenopathy. Does not bruise/bleed easily.   Psychiatric/Behavioral: Negative for confusion, decreased concentration and hallucinations.     Objective:   Physical Exam   Constitutional: She is oriented to person, place, and time. She appears well-developed and well-nourished. She has a sickly appearance. No distress. Nasal cannula in place.   HENT:   Head: Normocephalic and atraumatic.   Right Ear: External ear normal.   Left Ear: External ear normal.   Nose: Nose normal.   Mouth/Throat: Oropharynx is clear and moist. No oropharyngeal exudate.   Eyes: Conjunctivae and EOM are normal. Right eye exhibits no discharge. Left eye exhibits no discharge.   Neck: Normal range of motion. Neck supple. No JVD present.   Cardiovascular: Normal rate, regular rhythm and normal heart sounds. Exam reveals no friction rub.   No murmur heard.  Pulmonary/Chest: Effort normal. Tachypnea noted. She has no decreased breath sounds. She has no wheezes. She has no rhonchi. She has no rales.   Abdominal: Soft. Bowel sounds are normal. She exhibits distension (minimal). There is no tenderness.   Musculoskeletal: Normal range of motion. She exhibits no edema.   Lymphadenopathy:     She has no cervical adenopathy.   Neurological: She is alert and oriented to person, place, and time. No cranial nerve deficit.   Skin: Skin is warm and dry. Capillary refill takes less than 2 seconds. She is not diaphoretic. There is pallor.   Psychiatric: She has a normal mood and affect. Her behavior is normal. Judgment and thought content normal.   Nursing note and vitals reviewed.        Vital Signs (Most Recent):  Temp: 98.3 °F (36.8 °C) (04/10/19 1153)  Pulse: 90 (04/10/19  1153)  Resp: 20 (04/10/19 1153)  BP: (!) 133/90 (04/10/19 1153)  SpO2: 96 % (04/10/19 1153) Vital Signs (24h Range):  Temp:  [97.9 °F (36.6 °C)-98.6 °F (37 °C)] 98.3 °F (36.8 °C)  Pulse:  [] 90  Resp:  [16-31] 20  SpO2:  [95 %-99 %] 96 %  BP: (106-145)/(62-95) 133/90     Weight: 69.5 kg (153 lb 3.5 oz)  Body mass index is 29.92 kg/m².      Intake/Output Summary (Last 24 hours) at 4/10/2019 1248  Last data filed at 4/10/2019 0600  Gross per 24 hour   Intake 1760 ml   Output --   Net 1760 ml       Significant Labs:  CBC:  Recent Labs   Lab 04/10/19  0606   WBC 8.88   RBC 3.01*   HGB 7.7*   HCT 27.0*   *   MCV 90   MCH 25.6*   MCHC 28.5*     BMP:  Recent Labs   Lab 04/10/19  0606      K 4.3      CO2 31*   BUN 16   CREATININE 0.9   CALCIUM 8.9      Tacrolimus Levels:  Recent Labs   Lab 04/10/19  0606   TACROLIMUS 4.5*     Microbiology:  Microbiology Results (last 7 days)     Procedure Component Value Units Date/Time    Culture, Anaerobic [[id number]] Collected:  04/09/19 1401    Order Status:  Completed Specimen:  Sinus from Nares, Right Updated:  04/10/19 0745     Anaerobic Culture Culture in progress    Narrative:       Right Maxillary and Ethmoid sinus    Blood culture x two cultures. Draw prior to antibiotics. [[id number]] Collected:  04/05/19 1400    Order Status:  Completed Specimen:  Blood from Line, Subclavian, Right Updated:  04/09/19 1612     Blood Culture, Routine No Growth to date     Blood Culture, Routine No Growth to date     Blood Culture, Routine No Growth to date     Blood Culture, Routine No Growth to date     Blood Culture, Routine No Growth to date    Narrative:       Aerobic and anaerobic    Blood culture x two cultures. Draw prior to antibiotics. [[id number]] Collected:  04/05/19 1429    Order Status:  Completed Specimen:  Blood from Line, Subclavian, Right Updated:  04/09/19 1612     Blood Culture, Routine No Growth to date     Blood Culture, Routine No Growth to date      Blood Culture, Routine No Growth to date     Blood Culture, Routine No Growth to date     Blood Culture, Routine No Growth to date    Narrative:       Aerobic and anaerobic    Aerobic culture [[id number]] Collected:  04/09/19 1401    Order Status:  Sent Specimen:  Sinus from Nares, Right Updated:  04/09/19 1417    Fungus culture [[id number]] Collected:  04/09/19 1401    Order Status:  Sent Specimen:  Sinus from Nares, Right Updated:  04/09/19 1417    AFB Culture & Smear [[id number]] Collected:  04/09/19 1401    Order Status:  Sent Specimen:  Sinus from Nares, Right Updated:  04/09/19 1417    Respiratory Viral Panel by PCR [facility]; Nasal Swab [[id number]] Collected:  04/05/19 1429    Order Status:  Completed Specimen:  Respiratory Updated:  04/09/19 1204     Respiratory Virus Panel, source Nasal Swab     RVP - Adenovirus Not Detected     Comment: Detects Serotypes B and E. Detection of Serotype C may   be limited. If Adenovirus infection is suspected and a   Not Detected result is returned the sample should be   re-tested for Adenovirus using an independent method  (e.g. [employer] Adenovirus Quantitative Real-Time  PCR test.          Enterovirus Not Detected     Comment: Cross-reactivity has been observed between certain Rhinovirus  strains and the Enterovirus assay.          Human Bocavirus Not Detected     Human Coronavirus Not Detected     Comment: The Human Coronavirus assay detects Human coronavirus types  229E, OC43,NL63 and HKU1.          RVP - Human Metapneumovirus (hMPV) Not Detected     RVP - Influenza A Not Detected     Influenza A - S2M5-45 Not Detected     RVP - Influenza B Not Detected     Parainfluenza Not Detected     Respiratory Syncytial VirusVirus (RSV) A Not Detected     Comment: The Respiratory Syncytial Viral assay detects types A and B,  however it does not distinguish between the two.          RVP - Rhinovirus Not Detected     Comment: Cross-Reactivity has been observed between certain    Rhinovirus strains and the Enterovirus assay.  Target Enriched Mulitplex Polymerase Chain Reaction (TEM-PCR)  allows for the detection of multiple pathogens out of a single  reaction.  This test was developed and its performance   characteristics determined by [employer].  It has not   been cleared or approved by the U.S.Food and Drug Administration.  Results should be used in conjunction with clinical findings,   and should not form the sole basis for a diagnosis or treatment  decision.  TEM-PCR is a licensed technology of [employer].         Narrative:       Receiving Lab:->[facility]    Culture, Respiratory with Gram Stain [[id number]]  (Susceptibility) Collected:  04/05/19 1429    Order Status:  Completed Specimen:  Respiratory from Sputum Updated:  04/08/19 0943     Respiratory Culture --     METHICILLIN RESISTANT STAPHYLOCOCCUS AUREUS  Many       Respiratory Culture --     PSEUDOMONAS AERUGINOSA  Moderate       Gram Stain (Respiratory) <10 epithelial cells per low power field.     Gram Stain (Respiratory) Many WBC's     Gram Stain (Respiratory) Many Gram positive cocci     Gram Stain (Respiratory) Rare Gram positive rods     Gram Stain (Respiratory) Rare yeast    Influenza A & B by Molecular [[id number]] Collected:  04/05/19 1429    Order Status:  Completed Specimen:  Nasopharyngeal Swab Updated:  04/05/19 1521     Influenza A, Molecular Negative     Influenza B, Molecular Negative     Flu A & B Source Nasal swab          I have reviewed all pertinent labs within the past 24 hours.    Diagnostic Results:  Labs: Reviewed  X-Ray: Reviewed      Assessment/Plan:     * Staphylococcus aureus pneumonia  Blood cultures with NGTD. Previously with Coronavirus in 02/2019.  History of prior MRSA colonization.      Pseudomonas aerguinosa and MRSA isolated from respiratory culture. Will discharge home on 4/12 with plan to complete 2 week course of Vancomycin and Merrem.     Lung replaced by  transplant  Patient s/p BLT secondary to CF in 2014 with known CARLOS EDUARDO and currently undergoing workup for re-transplantation.  Was scheduled for pulmonary rehab outpatient, but was deferred secondary to insurance coverage problems. PT ordered for deconditioning. Will need colonoscopy as outpatient workup. Will plan to continue current immunosuppression and prophylaxis.    Immunosuppression  Continue tacrolimus and prednisone. Currently off MMF secondary to recurrent infections. Will monitor daily tacrolimus levels and dose adjust accordingly.     Prophylactic antibiotic  Continue dapsone.     Sinusitis, chronic  Continue Flonase and Singulair. Followed by Dr. Olsen outpatient and underwent OR washout 4/9. Continue sinus rinses. ENT following, appreciate recs. Dilaudid prn.     Chronic pain with opiate use  Established with pain management outpatient. Continue current regimen.     CF related Pancreatic insufficiency  Continue Creon with meals and snacks.         Shama Canales PA-C  Lung Transplant  [facility]

## 2019-04-10 NOTE — SUBJECTIVE & OBJECTIVE
Interval History: Tolerated procedure well yesterday. Reports pain control is not adequate. Minimal bloody drainage from nose.    Medications:  Continuous Infusions:  Scheduled Meds:   calcium-vitamin D3  1 tablet Oral BID WM    dapsone  100 mg Oral Daily    enoxaparin  40 mg Subcutaneous Daily    fluticasone  2 spray Each Nare Daily    folic acid  1,000 mcg Oral Daily    gabapentin  300 mg Oral TID    levalbuterol        levalbuterol  0.63 mg Nebulization Q6H WAKE    lipase-protease-amylase  4 capsule Oral TID WM    magnesium oxide  400 mg Oral BID    meropenem (MERREM) IVPB  1 g Intravenous Q8H    mometasone-formoterol  1 puff Inhalation BID    montelukast  10 mg Oral Daily    morphine  15 mg Oral BID    multivit-iron-FA-calcium-mins  1 tablet Oral BID    omeprazole  40 mg Oral BID    polyethylene glycol  17 g Oral BID    predniSONE  5 mg Oral Daily    QUEtiapine  25 mg Oral Daily    QUEtiapine  50 mg Oral QHS    tacrolimus  2.5 mg Oral BID    topiramate  25 mg Oral BID    vancomycin (VANCOCIN) IVPB  1,000 mg Intravenous Q12H    venlafaxine  150 mg Oral QHS    venlafaxine  37.5 mg Oral QHS     PRN Meds:acetaminophen, albuterol-ipratropium, butalbital-acetaminophen-caffeine -40 mg, diphenhydrAMINE, diphenhydrAMINE, hydrOXYzine HCl, LORazepam, magnesium sulfate IVPB **AND** magnesium sulfate IVPB, ondansetron, oxyCODONE, potassium chloride 10% **AND** potassium chloride 10% **AND** potassium chloride 10%, promethazine (PHENERGAN) IVPB, promethazine, sodium chloride 0.9%, tiZANidine     Review of patient's allergies indicates:   Allergen Reactions    Albuterol Palpitations    Colistin Anaphylaxis    Vancomycin analogues      Infusion reaction that does not resolve with slowing    Neupogen [filgrastim] Other (See Comments)     Ostealgia after five daily doses of 300 mcg.      Bactrim [sulfamethoxazole-trimethoprim] Hives    Ceftazidime Hives     Pt stated can tolerate cefapine not  ceftazidime    Ceftazidime     Dronabinol Other (See Comments)     Mental changes/hallucinations    Haldol [haloperidol lactate] Other (See Comments)     Seizure like activity    Nsaids (non-steroidal anti-inflammatory drug)      Cannot have due to lung transplant    Adhesive Rash     Cloth tape- please use tegaderm or paper tape    Aztreonam Rash    Ciprofloxacin Nausea And Vomiting     Projectile N/V, per patient.  Unwilling to retry therapy.     Objective:     Vital Signs (24h Range):  Temp:  [97.9 °F (36.6 °C)-98.7 °F (37.1 °C)] 98.4 °F (36.9 °C)  Pulse:  [] 88  Resp:  [16-31] 16  SpO2:  [95 %-99 %] 96 %  BP: (106-145)/(62-95) 137/84       Lines/Drains/Airways     Central Venous Catheter Line                 Port A Cath Single Lumen 04/05/19 1400 right subclavian 4 days                Physical Exam    Awake, Alert and Oriented. NAD, E4V5M6  Pupils equal, round. EOMI, no proptosis  Vision grossly intact, Hearing grossly intact   Face symmetric, non-edematous  Neck is symmetric  Normal work of breathing, no stridor  Good phonation      Significant Labs:  All pertinent labs from the last 24 hours have been reviewed.    Significant Diagnostics:  I have reviewed all pertinent imaging results/findings within the past 24 hours. 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

## 2019-04-10 NOTE — PROGRESS NOTES
[facility]  Otorhinolaryngology-Head & Neck Surgery  Progress Note    Subjective:     Post-Op Info:  Procedure(s) (LRB):  FESS, USING COMPUTER-ASSISTED NAVIGATION (N/A)   1 Day Post-Op  Hospital Day: 6     Interval History: Tolerated procedure well yesterday. Reports pain control is not adequate. Minimal bloody drainage from nose.    Medications:  Continuous Infusions:  Scheduled Meds:   calcium-vitamin D3  1 tablet Oral BID WM    dapsone  100 mg Oral Daily    enoxaparin  40 mg Subcutaneous Daily    fluticasone  2 spray Each Nare Daily    folic acid  1,000 mcg Oral Daily    gabapentin  300 mg Oral TID    levalbuterol        levalbuterol  0.63 mg Nebulization Q6H WAKE    lipase-protease-amylase  4 capsule Oral TID WM    magnesium oxide  400 mg Oral BID    meropenem (MERREM) IVPB  1 g Intravenous Q8H    mometasone-formoterol  1 puff Inhalation BID    montelukast  10 mg Oral Daily    morphine  15 mg Oral BID    multivit-iron-FA-calcium-mins  1 tablet Oral BID    omeprazole  40 mg Oral BID    polyethylene glycol  17 g Oral BID    predniSONE  5 mg Oral Daily    QUEtiapine  25 mg Oral Daily    QUEtiapine  50 mg Oral QHS    tacrolimus  2.5 mg Oral BID    topiramate  25 mg Oral BID    vancomycin (VANCOCIN) IVPB  1,000 mg Intravenous Q12H    venlafaxine  150 mg Oral QHS    venlafaxine  37.5 mg Oral QHS     PRN Meds:acetaminophen, albuterol-ipratropium, butalbital-acetaminophen-caffeine -40 mg, diphenhydrAMINE, diphenhydrAMINE, hydrOXYzine HCl, LORazepam, magnesium sulfate IVPB **AND** magnesium sulfate IVPB, ondansetron, oxyCODONE, potassium chloride 10% **AND** potassium chloride 10% **AND** potassium chloride 10%, promethazine (PHENERGAN) IVPB, promethazine, sodium chloride 0.9%, tiZANidine     Review of patient's allergies indicates:   Allergen Reactions    Albuterol Palpitations    Colistin Anaphylaxis    Vancomycin analogues      Infusion reaction that does not  resolve with slowing    Neupogen [filgrastim] Other (See Comments)     Ostealgia after five daily doses of 300 mcg.      Bactrim [sulfamethoxazole-trimethoprim] Hives    Ceftazidime Hives     Pt stated can tolerate cefapine not ceftazidime    Ceftazidime     Dronabinol Other (See Comments)     Mental changes/hallucinations    Haldol [haloperidol lactate] Other (See Comments)     Seizure like activity    Nsaids (non-steroidal anti-inflammatory drug)      Cannot have due to lung transplant    Adhesive Rash     Cloth tape- please use tegaderm or paper tape    Aztreonam Rash    Ciprofloxacin Nausea And Vomiting     Projectile N/V, per patient.  Unwilling to retry therapy.     Objective:     Vital Signs (24h Range):  Temp:  [97.9 °F (36.6 °C)-98.7 °F (37.1 °C)] 98.4 °F (36.9 °C)  Pulse:  [] 88  Resp:  [16-31] 16  SpO2:  [95 %-99 %] 96 %  BP: (106-145)/(62-95) 137/84       Lines/Drains/Airways     Central Venous Catheter Line                 Port A Cath Single Lumen 04/05/19 1400 right subclavian 4 days                Physical Exam    Awake, Alert and Oriented. NAD, E4V5M6  Pupils equal, round. EOMI, no proptosis  Vision grossly intact, Hearing grossly intact   Face symmetric, non-edematous  Neck is symmetric  Normal work of breathing, no stridor  Good phonation      Significant Labs:  All pertinent labs from the last 24 hours have been reviewed.    Significant Diagnostics:  I have reviewed all pertinent imaging results/findings within the past 24 hours. 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 Sinus cultures Jan '19 + for pseudomonas.     -please provide pain medication for postop pain. Patient takes oxycodone at home, ok to try this,  otherwise may need morphine or dilaudid for breakthough as patient has high tolerance  -plan to follow up with Dr. Burks in 1 week. If patient is still inpatient will see then, otherwise will need clinic visit.  Continue sinus regimen:   - Nasal saline rinses BID  - Nasal saline spray TID  - Flonase      -will attempt to bring to otology clinic today to examine ears            May Heath MD  Otorhinolaryngology-Head & Neck Surgery  [facility]

## 2019-04-10 NOTE — ASSESSMENT & PLAN NOTE
Patient s/p BLT secondary to CF in 2014 with known CARLOS EDUARDO and currently undergoing workup for re-transplantation.  Was scheduled for pulmonary rehab outpatient, but was deferred secondary to insurance coverage problems. PT ordered for deconditioning. Will need colonoscopy as outpatient workup. Will plan to continue current immunosuppression and prophylaxis.

## 2019-04-10 NOTE — ASSESSMENT & PLAN NOTE
Blood cultures with NGTD. Previously with Coronavirus in 02/2019.  History of prior MRSA colonization.      Pseudomonas aerguinosa and MRSA isolated from respiratory culture. Will discharge home on 4/12 with plan to complete 2 week course of Vancomycin and Merrem.

## 2019-04-11 NOTE — ASSESSMENT & PLAN NOTE
Continue Flonase and Singulair. Followed by ENT outpatient and underwent OR washout 4/9. Continue sinus rinses. ENT following, appreciate recs.

## 2019-04-11 NOTE — PROGRESS NOTES
Discharge Note:     SW met with pt to assess needs for discharge. Pt scheduled to discharge home with IV  antibiotics tomorrow. Referral made to Jake with [employer] [phone]/ fx [phone] (Pt previously established with infusion company ). Jake taught pt today and will have meds delivered tomorrow if pt discharges. Pt report has O2 tanks in order to dc. Pt spouse Shawn to assist with dc. Pt currently under referral process to [facility] [phone]/ [phone]      LOKI reviewed discharge plan with pt. Pt aware of, and involved in discharge plan. Pt to discharge home with the assistance her spouse who was present in the room. Pt reports coping adequately at this time.  Pt verbalized no additional needs or concerns at this time.  Contact information provided. LOKI to remain available.

## 2019-04-11 NOTE — SUBJECTIVE & OBJECTIVE
Subjective:     Interval History: No acute events overnight. Cough and abdominal distension improved. Pain well controlled. She continues to report ongoing weakness and fatigue. Appetite unchanged.     Continuous Infusions:  Scheduled Meds:   calcium-vitamin D3  1 tablet Oral BID WM    dapsone  100 mg Oral Daily    enoxaparin  40 mg Subcutaneous Daily    fluticasone  2 spray Each Nare Daily    folic acid  1,000 mcg Oral Daily    gabapentin  300 mg Oral TID    lipase-protease-amylase  4 capsule Oral TID WM    magnesium oxide  400 mg Oral BID    meropenem (MERREM) IVPB  1 g Intravenous Q8H    mometasone-formoterol  1 puff Inhalation BID    montelukast  10 mg Oral Daily    morphine  15 mg Oral BID    multivit-iron-FA-calcium-mins  1 tablet Oral BID    omeprazole  40 mg Oral BID    polyethylene glycol  17 g Oral BID    predniSONE  5 mg Oral Daily    QUEtiapine  25 mg Oral Daily    QUEtiapine  50 mg Oral QHS    sodium chloride  1 spray Each Nare TID    tacrolimus  2.5 mg Oral BID    topiramate  25 mg Oral BID    vancomycin (VANCOCIN) IVPB  1,000 mg Intravenous Q12H    venlafaxine  150 mg Oral QHS    venlafaxine  37.5 mg Oral QHS     PRN Meds:acetaminophen, butalbital-acetaminophen-caffeine -40 mg, diphenhydrAMINE, diphenhydrAMINE, HYDROmorphone, hydrOXYzine HCl, lactulose, levalbuterol, LORazepam, magnesium sulfate IVPB **AND** magnesium sulfate IVPB, ondansetron, oxyCODONE, potassium chloride 10% **AND** potassium chloride 10% **AND** potassium chloride 10%, promethazine (PHENERGAN) IVPB, promethazine, sodium chloride 0.9%, tiZANidine    Review of patient's allergies indicates:   Allergen Reactions    Albuterol Palpitations    Colistin Anaphylaxis    Vancomycin analogues      Infusion reaction that does not resolve with slowing    Neupogen [filgrastim] Other (See Comments)     Ostealgia after five daily doses of 300 mcg.      Bactrim [sulfamethoxazole-trimethoprim] Hives     Ceftazidime Hives     Pt stated can tolerate cefapine not ceftazidime    Ceftazidime     Dronabinol Other (See Comments)     Mental changes/hallucinations    Haldol [haloperidol lactate] Other (See Comments)     Seizure like activity    Nsaids (non-steroidal anti-inflammatory drug)      Cannot have due to lung transplant    Adhesive Rash     Cloth tape- please use tegaderm or paper tape    Aztreonam Rash    Ciprofloxacin Nausea And Vomiting     Projectile N/V, per patient.  Unwilling to retry therapy.       Review of Systems   Constitutional: Positive for activity change, appetite change and fatigue. Negative for chills and fever.   HENT: Positive for congestion, sinus pain and tinnitus. Negative for ear discharge, ear pain, rhinorrhea and sore throat.    Eyes: Negative for discharge and redness.   Respiratory: Positive for cough and shortness of breath. Negative for chest tightness and wheezing.    Cardiovascular: Negative for chest pain, palpitations and leg swelling.   Gastrointestinal: Negative for abdominal distention, abdominal pain, constipation, diarrhea, nausea and vomiting.   Endocrine: Negative for polydipsia and polyuria.   Genitourinary: Negative for difficulty urinating, dysuria, frequency and urgency.   Musculoskeletal: Positive for myalgias. Negative for arthralgias, neck pain and neck stiffness.   Skin: Positive for pallor. Negative for rash.   Allergic/Immunologic: Positive for immunocompromised state.   Neurological: Positive for headaches. Negative for dizziness, weakness, light-headedness and numbness.   Hematological: Negative for adenopathy. Does not bruise/bleed easily.   Psychiatric/Behavioral: Negative for confusion, decreased concentration and hallucinations.     Objective:   Physical Exam   Constitutional: She is oriented to person, place, and time. She appears well-developed and well-nourished. She has a sickly appearance. No distress. Nasal cannula in place.   HENT:   Head:  Normocephalic and atraumatic.   Right Ear: External ear normal.   Left Ear: External ear normal.   Nose: Nose normal.   Mouth/Throat: Oropharynx is clear and moist. No oropharyngeal exudate.   Eyes: Conjunctivae and EOM are normal. Right eye exhibits no discharge. Left eye exhibits no discharge.   Neck: Normal range of motion. Neck supple. No JVD present.   Cardiovascular: Normal rate, regular rhythm and normal heart sounds. Exam reveals no friction rub.   No murmur heard.  Pulmonary/Chest: Effort normal. Tachypnea noted. She has no decreased breath sounds. She has no wheezes. She has no rhonchi. She has no rales.   Abdominal: Soft. Bowel sounds are normal. She exhibits no distension (minimal). There is no tenderness.   Musculoskeletal: Normal range of motion. She exhibits no edema.   Lymphadenopathy:     She has no cervical adenopathy.   Neurological: She is alert and oriented to person, place, and time. No cranial nerve deficit.   Skin: Skin is warm and dry. Capillary refill takes less than 2 seconds. She is not diaphoretic. There is pallor.   Psychiatric: She has a normal mood and affect. Her behavior is normal. Judgment and thought content normal.   Nursing note and vitals reviewed.        Vital Signs (Most Recent):  Temp: 98.3 °F (36.8 °C) (04/11/19 0758)  Pulse: 92 (04/11/19 0758)  Resp: 18 (04/11/19 0758)  BP: (!) 124/93 (04/11/19 0758)  SpO2: 96 % (04/11/19 0758) Vital Signs (24h Range):  Temp:  [98.2 °F (36.8 °C)-98.7 °F (37.1 °C)] 98.3 °F (36.8 °C)  Pulse:  [] 92  Resp:  [16-20] 18  SpO2:  [93 %-98 %] 96 %  BP: (115-133)/(82-93) 124/93     Weight: 69.5 kg (153 lb 3.5 oz)  Body mass index is 29.92 kg/m².    No intake or output data in the 24 hours ending 04/11/19 0910    Significant Labs:  CBC:  Recent Labs   Lab 04/11/19  0500   WBC 7.71   RBC 2.95*   HGB 7.5*   HCT 26.7*      MCV 91   MCH 25.4*   MCHC 28.1*     BMP:  Recent Labs   Lab 04/11/19  0500      K 3.7      CO2 31*   BUN  16   CREATININE 0.8   CALCIUM 8.7      Tacrolimus Levels:  Recent Labs   Lab 04/10/19  0606   TACROLIMUS 4.5*     Microbiology:  Microbiology Results (last 7 days)     Procedure Component Value Units Date/Time    AFB Culture & Smear [[id number]] Collected:  04/09/19 1401    Order Status:  Completed Specimen:  Sinus from Nares, Right Updated:  04/10/19 2127     AFB Culture & Smear Culture in progress     AFB CULTURE STAIN No acid fast bacilli seen.    Narrative:       Right Maxillary and Ethmoid sinus    Blood culture x two cultures. Draw prior to antibiotics. [[id number]] Collected:  04/05/19 1400    Order Status:  Completed Specimen:  Blood from Line, Subclavian, Right Updated:  04/10/19 1612     Blood Culture, Routine No growth after 5 days.    Narrative:       Aerobic and anaerobic    Blood culture x two cultures. Draw prior to antibiotics. [[id number]] Collected:  04/05/19 1429    Order Status:  Completed Specimen:  Blood from Line, Subclavian, Right Updated:  04/10/19 1612     Blood Culture, Routine No growth after 5 days.    Narrative:       Aerobic and anaerobic    Aerobic culture [[id number]] Collected:  04/09/19 1401    Order Status:  Completed Specimen:  Sinus from Nares, Right Updated:  04/10/19 1334     Aerobic Bacterial Culture --     STAPHYLOCOCCUS AUREUS  Moderate  Susceptibility pending       Aerobic Bacterial Culture --     GRAM NEGATIVE AUBREE  Few  Identification and susceptibility pending      Narrative:       Right Maxillary and Ethmoid sinus    Culture, Anaerobic [[id number]] Collected:  04/09/19 1401    Order Status:  Completed Specimen:  Sinus from Nares, Right Updated:  04/10/19 0745     Anaerobic Culture Culture in progress    Narrative:       Right Maxillary and Ethmoid sinus    Fungus culture [[id number]] Collected:  04/09/19 1401    Order Status:  Sent Specimen:  Sinus from Nares, Right Updated:  04/09/19 1417    Respiratory Viral Panel by PCR [facility]; Nasal Swab [[id number]] Collected:  04/05/19  1429    Order Status:  Completed Specimen:  Respiratory Updated:  04/09/19 1204     Respiratory Virus Panel, source Nasal Swab     RVP - Adenovirus Not Detected     Comment: Detects Serotypes B and E. Detection of Serotype C may   be limited. If Adenovirus infection is suspected and a   Not Detected result is returned the sample should be   re-tested for Adenovirus using an independent method  (e.g. [employer] Adenovirus Quantitative Real-Time  PCR test.          Enterovirus Not Detected     Comment: Cross-reactivity has been observed between certain Rhinovirus  strains and the Enterovirus assay.          Human Bocavirus Not Detected     Human Coronavirus Not Detected     Comment: The Human Coronavirus assay detects Human coronavirus types  229E, OC43,NL63 and HKU1.          RVP - Human Metapneumovirus (hMPV) Not Detected     RVP - Influenza A Not Detected     Influenza A - [id number] Not Detected     RVP - Influenza B Not Detected     Parainfluenza Not Detected     Respiratory Syncytial VirusVirus (RSV) A Not Detected     Comment: The Respiratory Syncytial Viral assay detects types A and B,  however it does not distinguish between the two.          RVP - Rhinovirus Not Detected     Comment: Cross-Reactivity has been observed between certain   Rhinovirus strains and the Enterovirus assay.  Target Enriched Mulitplex Polymerase Chain Reaction (TEM-PCR)  allows for the detection of multiple pathogens out of a single  reaction.  This test was developed and its performance   characteristics determined by [employer].  It has not   been cleared or approved by the U.[place].Food and Drug Administration.  Results should be used in conjunction with clinical findings,   and should not form the sole basis for a diagnosis or treatment  decision.  TEM-PCR is a licensed technology of [employer].         Narrative:       Receiving Lab:->[facility]    Culture, Respiratory with Gram Stain [[id number]]   (Susceptibility) Collected:  04/05/19 1429    Order Status:  Completed Specimen:  Respiratory from Sputum Updated:  04/08/19 0943     Respiratory Culture --     METHICILLIN RESISTANT STAPHYLOCOCCUS AUREUS  Many       Respiratory Culture --     PSEUDOMONAS AERUGINOSA  Moderate       Gram Stain (Respiratory) <10 epithelial cells per low power field.     Gram Stain (Respiratory) Many WBC's     Gram Stain (Respiratory) Many Gram positive cocci     Gram Stain (Respiratory) Rare Gram positive rods     Gram Stain (Respiratory) Rare yeast    Influenza A & B by Molecular [[id number]] Collected:  04/05/19 1429    Order Status:  Completed Specimen:  Nasopharyngeal Swab Updated:  04/05/19 1521     Influenza A, Molecular Negative     Influenza B, Molecular Negative     Flu A & B Source Nasal swab          I have reviewed all pertinent labs within the past 24 hours.    Diagnostic Results:  Labs: Reviewed  X-Ray: Reviewed

## 2019-04-11 NOTE — PROGRESS NOTES
[facility]  Lung Transplant  Progress Note - Floor    Patient Name: Juanita Ibarra  MRN: [id number]  Admission Date: 4/5/2019  Hospital Length of Stay: 6 days  Post-Operative Day: 1764  Attending Physician: Francisca Morin DO  Primary Care Provider: Primary Doctor No     Subjective:     Interval History: No acute events overnight. Cough and abdominal distension improved. Pain well controlled. She continues to report ongoing weakness and fatigue. Appetite unchanged.     Continuous Infusions:  Scheduled Meds:   calcium-vitamin D3  1 tablet Oral BID WM    dapsone  100 mg Oral Daily    enoxaparin  40 mg Subcutaneous Daily    fluticasone  2 spray Each Nare Daily    folic acid  1,000 mcg Oral Daily    gabapentin  300 mg Oral TID    lipase-protease-amylase  4 capsule Oral TID WM    magnesium oxide  400 mg Oral BID    meropenem (MERREM) IVPB  1 g Intravenous Q8H    mometasone-formoterol  1 puff Inhalation BID    montelukast  10 mg Oral Daily    morphine  15 mg Oral BID    multivit-iron-FA-calcium-mins  1 tablet Oral BID    omeprazole  40 mg Oral BID    polyethylene glycol  17 g Oral BID    predniSONE  5 mg Oral Daily    QUEtiapine  25 mg Oral Daily    QUEtiapine  50 mg Oral QHS    sodium chloride  1 spray Each Nare TID    tacrolimus  2.5 mg Oral BID    topiramate  25 mg Oral BID    vancomycin (VANCOCIN) IVPB  1,000 mg Intravenous Q12H    venlafaxine  150 mg Oral QHS    venlafaxine  37.5 mg Oral QHS     PRN Meds:acetaminophen, butalbital-acetaminophen-caffeine -40 mg, diphenhydrAMINE, diphenhydrAMINE, HYDROmorphone, hydrOXYzine HCl, lactulose, levalbuterol, LORazepam, magnesium sulfate IVPB **AND** magnesium sulfate IVPB, ondansetron, oxyCODONE, potassium chloride 10% **AND** potassium chloride 10% **AND** potassium chloride 10%, promethazine (PHENERGAN) IVPB, promethazine, sodium chloride 0.9%, tiZANidine    Review of patient's allergies indicates:   Allergen  Reactions    Albuterol Palpitations    Colistin Anaphylaxis    Vancomycin analogues      Infusion reaction that does not resolve with slowing    Neupogen [filgrastim] Other (See Comments)     Ostealgia after five daily doses of 300 mcg.      Bactrim [sulfamethoxazole-trimethoprim] Hives    Ceftazidime Hives     Pt stated can tolerate cefapine not ceftazidime    Ceftazidime     Dronabinol Other (See Comments)     Mental changes/hallucinations    Haldol [haloperidol lactate] Other (See Comments)     Seizure like activity    Nsaids (non-steroidal anti-inflammatory drug)      Cannot have due to lung transplant    Adhesive Rash     Cloth tape- please use tegaderm or paper tape    Aztreonam Rash    Ciprofloxacin Nausea And Vomiting     Projectile N/V, per patient.  Unwilling to retry therapy.       Review of Systems   Constitutional: Positive for activity change, appetite change and fatigue. Negative for chills and fever.   HENT: Positive for congestion, sinus pain and tinnitus. Negative for ear discharge, ear pain, rhinorrhea and sore throat.    Eyes: Negative for discharge and redness.   Respiratory: Positive for cough and shortness of breath. Negative for chest tightness and wheezing.    Cardiovascular: Negative for chest pain, palpitations and leg swelling.   Gastrointestinal: Negative for abdominal distention, abdominal pain, constipation, diarrhea, nausea and vomiting.   Endocrine: Negative for polydipsia and polyuria.   Genitourinary: Negative for difficulty urinating, dysuria, frequency and urgency.   Musculoskeletal: Positive for myalgias. Negative for arthralgias, neck pain and neck stiffness.   Skin: Positive for pallor. Negative for rash.   Allergic/Immunologic: Positive for immunocompromised state.   Neurological: Positive for headaches. Negative for dizziness, weakness, light-headedness and numbness.   Hematological: Negative for adenopathy. Does not bruise/bleed easily.    Psychiatric/Behavioral: Negative for confusion, decreased concentration and hallucinations.     Objective:   Physical Exam   Constitutional: She is oriented to person, place, and time. She appears well-developed and well-nourished. She has a sickly appearance. No distress. Nasal cannula in place.   HENT:   Head: Normocephalic and atraumatic.   Right Ear: External ear normal.   Left Ear: External ear normal.   Nose: Nose normal.   Mouth/Throat: Oropharynx is clear and moist. No oropharyngeal exudate.   Eyes: Conjunctivae and EOM are normal. Right eye exhibits no discharge. Left eye exhibits no discharge.   Neck: Normal range of motion. Neck supple. No JVD present.   Cardiovascular: Normal rate, regular rhythm and normal heart sounds. Exam reveals no friction rub.   No murmur heard.  Pulmonary/Chest: Effort normal. Tachypnea noted. She has no decreased breath sounds. She has no wheezes. She has no rhonchi. She has no rales.   Abdominal: Soft. Bowel sounds are normal. She exhibits no distension (minimal). There is no tenderness.   Musculoskeletal: Normal range of motion. She exhibits no edema.   Lymphadenopathy:     She has no cervical adenopathy.   Neurological: She is alert and oriented to person, place, and time. No cranial nerve deficit.   Skin: Skin is warm and dry. Capillary refill takes less than 2 seconds. She is not diaphoretic. There is pallor.   Psychiatric: She has a normal mood and affect. Her behavior is normal. Judgment and thought content normal.   Nursing note and vitals reviewed.        Vital Signs (Most Recent):  Temp: 98.3 °F (36.8 °C) (04/11/19 0758)  Pulse: 92 (04/11/19 0758)  Resp: 18 (04/11/19 0758)  BP: (!) 124/93 (04/11/19 0758)  SpO2: 96 % (04/11/19 0758) Vital Signs (24h Range):  Temp:  [98.2 °F (36.8 °C)-98.7 °F (37.1 °C)] 98.3 °F (36.8 °C)  Pulse:  [] 92  Resp:  [16-20] 18  SpO2:  [93 %-98 %] 96 %  BP: (115-133)/(82-93) 124/93     Weight: 69.5 kg (153 lb 3.5 oz)  Body mass index is  29.92 kg/m².    No intake or output data in the 24 hours ending 04/11/19 0953    Significant Labs:  CBC:  Recent Labs   Lab 04/11/19  0500   WBC 7.71   RBC 2.95*   HGB 7.5*   HCT 26.7*      MCV 91   MCH 25.4*   MCHC 28.1*     BMP:  Recent Labs   Lab 04/11/19  0500      K 3.7      CO2 31*   BUN 16   CREATININE 0.8   CALCIUM 8.7      Tacrolimus Levels:  Recent Labs   Lab 04/10/19  0606   TACROLIMUS 4.5*     Microbiology:  Microbiology Results (last 7 days)     Procedure Component Value Units Date/Time    AFB Culture & Smear [[id number]] Collected:  04/09/19 1401    Order Status:  Completed Specimen:  Sinus from Nares, Right Updated:  04/10/19 2127     AFB Culture & Smear Culture in progress     AFB CULTURE STAIN No acid fast bacilli seen.    Narrative:       Right Maxillary and Ethmoid sinus    Blood culture x two cultures. Draw prior to antibiotics. [[id number]] Collected:  04/05/19 1400    Order Status:  Completed Specimen:  Blood from Line, Subclavian, Right Updated:  04/10/19 1612     Blood Culture, Routine No growth after 5 days.    Narrative:       Aerobic and anaerobic    Blood culture x two cultures. Draw prior to antibiotics. [[id number]] Collected:  04/05/19 1429    Order Status:  Completed Specimen:  Blood from Line, Subclavian, Right Updated:  04/10/19 1612     Blood Culture, Routine No growth after 5 days.    Narrative:       Aerobic and anaerobic    Aerobic culture [[id number]] Collected:  04/09/19 1401    Order Status:  Completed Specimen:  Sinus from Nares, Right Updated:  04/10/19 1334     Aerobic Bacterial Culture --     STAPHYLOCOCCUS AUREUS  Moderate  Susceptibility pending       Aerobic Bacterial Culture --     GRAM NEGATIVE AUBREE  Few  Identification and susceptibility pending      Narrative:       Right Maxillary and Ethmoid sinus    Culture, Anaerobic [[id number]] Collected:  04/09/19 1401    Order Status:  Completed Specimen:  Sinus from Nares, Right Updated:  04/10/19 0787      Anaerobic Culture Culture in progress    Narrative:       Right Maxillary and Ethmoid sinus    Fungus culture [[id number]] Collected:  04/09/19 1401    Order Status:  Sent Specimen:  Sinus from Nares, Right Updated:  04/09/19 1417    Respiratory Viral Panel by PCR [facility]; Nasal Swab [[id number]] Collected:  04/05/19 1429    Order Status:  Completed Specimen:  Respiratory Updated:  04/09/19 1204     Respiratory Virus Panel, source Nasal Swab     RVP - Adenovirus Not Detected     Comment: Detects Serotypes B and E. Detection of Serotype C may   be limited. If Adenovirus infection is suspected and a   Not Detected result is returned the sample should be   re-tested for Adenovirus using an independent method  (e.g. [employer] Adenovirus Quantitative Real-Time  PCR test.          Enterovirus Not Detected     Comment: Cross-reactivity has been observed between certain Rhinovirus  strains and the Enterovirus assay.          Human Bocavirus Not Detected     Human Coronavirus Not Detected     Comment: The Human Coronavirus assay detects Human coronavirus types  229E, OC43,NL63 and HKU1.          RVP - Human Metapneumovirus (hMPV) Not Detected     RVP - Influenza A Not Detected     Influenza A - [id number] Not Detected     RVP - Influenza B Not Detected     Parainfluenza Not Detected     Respiratory Syncytial VirusVirus (RSV) A Not Detected     Comment: The Respiratory Syncytial Viral assay detects types A and B,  however it does not distinguish between the two.          RVP - Rhinovirus Not Detected     Comment: Cross-Reactivity has been observed between certain   Rhinovirus strains and the Enterovirus assay.  Target Enriched Mulitplex Polymerase Chain Reaction (TEM-PCR)  allows for the detection of multiple pathogens out of a single  reaction.  This test was developed and its performance   characteristics determined by [employer].  It has not   been cleared or approved by the [place].Food and Drug  Administration.  Results should be used in conjunction with clinical findings,   and should not form the sole basis for a diagnosis or treatment  decision.  TEM-PCR is a licensed technology of [employer].         Narrative:       Receiving Lab:->[facility]    Culture, Respiratory with Gram Stain [[id number]]  (Susceptibility) Collected:  04/05/19 1429    Order Status:  Completed Specimen:  Respiratory from Sputum Updated:  04/08/19 0943     Respiratory Culture --     METHICILLIN RESISTANT STAPHYLOCOCCUS AUREUS  Many       Respiratory Culture --     PSEUDOMONAS AERUGINOSA  Moderate       Gram Stain (Respiratory) <10 epithelial cells per low power field.     Gram Stain (Respiratory) Many WBC's     Gram Stain (Respiratory) Many Gram positive cocci     Gram Stain (Respiratory) Rare Gram positive rods     Gram Stain (Respiratory) Rare yeast    Influenza A & B by Molecular [[id number]] Collected:  04/05/19 1429    Order Status:  Completed Specimen:  Nasopharyngeal Swab Updated:  04/05/19 1521     Influenza A, Molecular Negative     Influenza B, Molecular Negative     Flu A & B Source Nasal swab          I have reviewed all pertinent labs within the past 24 hours.    Diagnostic Results:  Labs: Reviewed  X-Ray: Reviewed      Assessment/Plan:     * Staphylococcus aureus pneumonia  Blood cultures with NGTD. Previously with Coronavirus in 02/2019.  History of prior MRSA colonization.      Pseudomonas aerguinosa and MRSA isolated from respiratory culture. Tentative discharge home on 4/12 with plan to complete 2 week course of Vancomycin and Merrem.     Lung replaced by transplant  Patient s/p BLT secondary to CF in 2014 with known CARLOS EDUARDO and currently undergoing workup for re-transplantation.  Was scheduled for pulmonary rehab outpatient, but was deferred secondary to insurance coverage problems. PT ordered for deconditioning. Will need colonoscopy as outpatient workup. Will plan to continue current immunosuppression  and prophylaxis.    Immunosuppression  Continue tacrolimus and prednisone. Currently off MMF secondary to recurrent infections. Will monitor daily tacrolimus levels and dose adjust accordingly.     Prophylactic antibiotic  Continue dapsone.     Sinusitis, chronic  Continue Flonase and Singulair. Followed by ENT outpatient and underwent OR washout 4/9. Continue sinus rinses. ENT following, appreciate recs.     Chronic pain with opiate use  Established with pain management outpatient. Continue current regimen.     CF related Pancreatic insufficiency  Continue Creon with meals and snacks.         Shama Canales PA-C  Lung Transplant  [facility]

## 2019-04-11 NOTE — ASSESSMENT & PLAN NOTE
Blood cultures with NGTD. Previously with Coronavirus in 02/2019.  History of prior MRSA colonization.      Pseudomonas aerguinosa and MRSA isolated from respiratory culture. Tentative discharge home on 4/12 with plan to complete 2 week course of Vancomycin and Merrem.

## 2019-04-12 NOTE — PLAN OF CARE
Problem: Spiritual Distress Risk or Actual  Goal: Spiritual Wellbeing    Intervention: Promote Spiritual Wellbeing  Provided f/u visit. Pt present. Introduced and offered pastoral support. Pt and pt's  present. No spiritual needs expressed at this time. Pt and  aware of 's presence as needed.

## 2019-04-12 NOTE — PLAN OF CARE
Problem: Adult Inpatient Plan of Care  Goal: Plan of Care Review  - Patient is AAOx4, independent and ambulatory. Patient educated to use call bell for assistance, verbalized understanding.   - Afebrile, WBC 6.60 - holding IV vanc for trough 30.3, continuing IV meropenem   - Patient remains on face tent at 9 L - oxygen > 91 % so far this shift - NO nasal cannula until 4/16  - Patient manages saline nasal rinses independently   - Patient complained of pain twice so far this shift, PRN oxycodone administered per order   - Patient complained of a headache and requested increased Fioricet dose - PA placed order for one time dose and patient stated she does not need at this time   - See flow sheet for complete assessment details

## 2019-04-12 NOTE — PROGRESS NOTES
[facility]  Lung Transplant  Progress Note - Floor    Patient Name: Juanita Ibarra  MRN: [id number]  Admission Date: 4/5/2019  Hospital Length of Stay: 7 days  Post-Operative Day: 1765  Attending Physician: Francisca Morin DO  Primary Care Provider: Primary Doctor No     Subjective:     Interval History: No acute events overnight. Remains with nausea. Denies any abdominal pain, vomiting, diarrhea, or constipation. Last BM yesterday. Recommended bland diet of toast, soup, etc to aid with nausea relief. PRN Zofran and Phenergan available. Pain well controlled with current outpatient regimen. Patient remains with adequate oxygenation on face tent as nasal cannula cannot be used due to recent sinus procedure. Patient reporting that she does not yet feel like she is ready to discharge at this time.     Continuous Infusions:  Scheduled Meds:   calcium-vitamin D3  1 tablet Oral BID WM    dapsone  100 mg Oral Daily    enoxaparin  40 mg Subcutaneous Daily    fluticasone  2 spray Each Nare Daily    folic acid  1,000 mcg Oral Daily    gabapentin  300 mg Oral TID    lipase-protease-amylase  4 capsule Oral TID WM    magnesium oxide  400 mg Oral BID    meropenem (MERREM) IVPB  1 g Intravenous Q8H    mometasone-formoterol  1 puff Inhalation BID    montelukast  10 mg Oral Daily    morphine  15 mg Oral BID    multivit-iron-FA-calcium-mins  1 tablet Oral BID    omeprazole  40 mg Oral BID    polyethylene glycol  17 g Oral BID    predniSONE  5 mg Oral Daily    QUEtiapine  25 mg Oral Daily    QUEtiapine  50 mg Oral QHS    sodium chloride  1 spray Each Nare TID    tacrolimus  2.5 mg Oral BID    topiramate  25 mg Oral BID    [START ON 4/13/2019] vancomycin (VANCOCIN) IVPB  750 mg Intravenous Q12H    venlafaxine  150 mg Oral QHS    venlafaxine  37.5 mg Oral QHS     PRN Meds:acetaminophen, butalbital-acetaminophen-caffeine -40 mg, diphenhydrAMINE, diphenhydrAMINE,  hydrocortisone, hydrOXYzine HCl, lactulose, levalbuterol, LORazepam, magnesium sulfate IVPB **AND** magnesium sulfate IVPB, ondansetron, oxyCODONE, potassium chloride 10% **AND** potassium chloride 10% **AND** potassium chloride 10%, promethazine (PHENERGAN) IVPB, promethazine, sodium chloride 0.9%, tiZANidine    Review of patient's allergies indicates:   Allergen Reactions    Albuterol Palpitations    Colistin Anaphylaxis    Vancomycin analogues      Infusion reaction that does not resolve with slowing    Neupogen [filgrastim] Other (See Comments)     Ostealgia after five daily doses of 300 mcg.      Bactrim [sulfamethoxazole-trimethoprim] Hives    Ceftazidime Hives     Pt stated can tolerate cefapine not ceftazidime    Ceftazidime     Dronabinol Other (See Comments)     Mental changes/hallucinations    Haldol [haloperidol lactate] Other (See Comments)     Seizure like activity    Nsaids (non-steroidal anti-inflammatory drug)      Cannot have due to lung transplant    Adhesive Rash     Cloth tape- please use tegaderm or paper tape    Aztreonam Rash    Ciprofloxacin Nausea And Vomiting     Projectile N/V, per patient.  Unwilling to retry therapy.       Review of Systems   Constitutional: Positive for activity change, appetite change and fatigue. Negative for chills and fever.   HENT: Positive for congestion, sinus pain and tinnitus. Negative for ear discharge, ear pain, rhinorrhea and sore throat.    Eyes: Negative for discharge and redness.   Respiratory: Positive for cough and shortness of breath. Negative for chest tightness and wheezing.    Cardiovascular: Negative for chest pain, palpitations and leg swelling.   Gastrointestinal: Positive for nausea. Negative for abdominal distention, abdominal pain, constipation, diarrhea and vomiting.   Endocrine: Negative for polydipsia and polyuria.   Genitourinary: Negative for difficulty urinating, dysuria, frequency and urgency.   Musculoskeletal: Positive  for myalgias. Negative for arthralgias, neck pain and neck stiffness.   Skin: Positive for pallor. Negative for rash.   Allergic/Immunologic: Positive for immunocompromised state.   Neurological: Positive for headaches. Negative for dizziness, weakness, light-headedness and numbness.   Hematological: Negative for adenopathy. Does not bruise/bleed easily.   Psychiatric/Behavioral: Negative for confusion, decreased concentration and hallucinations.     Objective:   Physical Exam   Constitutional: She is oriented to person, place, and time. She appears well-developed and well-nourished. She has a sickly appearance. No distress. Face tent in place.   HENT:   Head: Normocephalic and atraumatic.   Right Ear: External ear normal.   Left Ear: External ear normal.   Nose: Nose normal.   Mouth/Throat: Oropharynx is clear and moist. No oropharyngeal exudate.   Eyes: Conjunctivae and EOM are normal. Right eye exhibits no discharge. Left eye exhibits no discharge.   Neck: Normal range of motion. Neck supple. No JVD present.   Cardiovascular: Normal rate, regular rhythm and normal heart sounds. Exam reveals no friction rub.   No murmur heard.  Pulmonary/Chest: Effort normal. Tachypnea noted. She has no decreased breath sounds. She has no wheezes. She has no rhonchi. She has no rales.   Abdominal: Soft. Bowel sounds are normal. She exhibits distension (mild). There is no tenderness.   Musculoskeletal: Normal range of motion. She exhibits no edema.   Lymphadenopathy:     She has no cervical adenopathy.   Neurological: She is alert and oriented to person, place, and time. No cranial nerve deficit.   Skin: Skin is warm and dry. Capillary refill takes less than 2 seconds. She is not diaphoretic. There is pallor.   Psychiatric: She has a normal mood and affect. Her behavior is normal. Judgment and thought content normal.   Nursing note and vitals reviewed.        Vital Signs (Most Recent):  Temp: 98.6 °F (37 °C) (04/12/19 0801)  Pulse:  89 (04/12/19 0801)  Resp: 18 (04/12/19 0801)  BP: 112/78 (04/12/19 0801)  SpO2: 96 % (04/12/19 0801) Vital Signs (24h Range):  Temp:  [98.4 °F (36.9 °C)-98.7 °F (37.1 °C)] 98.6 °F (37 °C)  Pulse:  [] 89  Resp:  [18] 18  SpO2:  [92 %-98 %] 96 %  BP: (111-135)/(69-85) 112/78     Weight: 69.5 kg (153 lb 3.5 oz)  Body mass index is 29.92 kg/m².      Intake/Output Summary (Last 24 hours) at 4/12/2019 1101  Last data filed at 4/11/2019 1500  Gross per 24 hour   Intake 830 ml   Output 0 ml   Net 830 ml       Significant Labs:  CBC:  Recent Labs   Lab 04/12/19  0500   WBC 6.60   RBC 3.00*   HGB 7.7*   HCT 27.6*      MCV 92   MCH 25.7*   MCHC 27.9*     BMP:  Recent Labs   Lab 04/12/19  0500      K 4.0      CO2 28   BUN 13   CREATININE 0.8   CALCIUM 8.1*      Tacrolimus Levels:  Recent Labs   Lab 04/12/19  0500   TACROLIMUS 3.9*     Microbiology:  Microbiology Results (last 7 days)     Procedure Component Value Units Date/Time    Aerobic culture [[id number]]  (Susceptibility) Collected:  04/09/19 1401    Order Status:  Completed Specimen:  Sinus from Nares, Right Updated:  04/11/19 1254     Aerobic Bacterial Culture --     METHICILLIN RESISTANT STAPHYLOCOCCUS AUREUS  Moderate       Aerobic Bacterial Culture --     PSEUDOMONAS AERUGINOSA  Few      Narrative:       Right Maxillary and Ethmoid sinus    Culture, Anaerobic [[id number]] Collected:  04/09/19 1401    Order Status:  Completed Specimen:  Sinus from Nares, Right Updated:  04/11/19 1111     Anaerobic Culture Culture in progress    Narrative:       Right Maxillary and Ethmoid sinus    AFB Culture & Smear [[id number]] Collected:  04/09/19 1401    Order Status:  Completed Specimen:  Sinus from Nares, Right Updated:  04/10/19 2127     AFB Culture & Smear Culture in progress     AFB CULTURE STAIN No acid fast bacilli seen.    Narrative:       Right Maxillary and Ethmoid sinus    Blood culture x two cultures. Draw prior to antibiotics. [[id number]]  Collected:  04/05/19 1400    Order Status:  Completed Specimen:  Blood from Line, Subclavian, Right Updated:  04/10/19 1612     Blood Culture, Routine No growth after 5 days.    Narrative:       Aerobic and anaerobic    Blood culture x two cultures. Draw prior to antibiotics. [[id number]] Collected:  04/05/19 1429    Order Status:  Completed Specimen:  Blood from Line, Subclavian, Right Updated:  04/10/19 1612     Blood Culture, Routine No growth after 5 days.    Narrative:       Aerobic and anaerobic    Fungus culture [[id number]] Collected:  04/09/19 1401    Order Status:  Sent Specimen:  Sinus from Nares, Right Updated:  04/09/19 1417    Respiratory Viral Panel by PCR [facility]; Nasal Swab [[id number]] Collected:  04/05/19 1429    Order Status:  Completed Specimen:  Respiratory Updated:  04/09/19 1204     Respiratory Virus Panel, source Nasal Swab     RVP - Adenovirus Not Detected     Comment: Detects Serotypes B and E. Detection of Serotype C may   be limited. If Adenovirus infection is suspected and a   Not Detected result is returned the sample should be   re-tested for Adenovirus using an independent method  (e.g. MEPS Real-Time Adenovirus Quantitative Real-Time  PCR test.          Enterovirus Not Detected     Comment: Cross-reactivity has been observed between certain Rhinovirus  strains and the Enterovirus assay.          Human Bocavirus Not Detected     Human Coronavirus Not Detected     Comment: The Human Coronavirus assay detects Human coronavirus types  229E, OC43,NL63 and HKU1.          RVP - Human Metapneumovirus (hMPV) Not Detected     RVP - Influenza A Not Detected     Influenza A - [id number] Not Detected     RVP - Influenza B Not Detected     Parainfluenza Not Detected     Respiratory Syncytial VirusVirus (RSV) A Not Detected     Comment: The Respiratory Syncytial Viral assay detects types A and B,  however it does not distinguish between the two.          RVP - Rhinovirus Not Detected     Comment:  Cross-Reactivity has been observed between certain   Rhinovirus strains and the Enterovirus assay.  Target Enriched Mulitplex Polymerase Chain Reaction (TEM-PCR)  allows for the detection of multiple pathogens out of a single  reaction.  This test was developed and its performance   characteristics determined by [employer].  It has not   been cleared or approved by the U.[place].Food and Drug Administration.  Results should be used in conjunction with clinical findings,   and should not form the sole basis for a diagnosis or treatment  decision.  TEM-PCR is a licensed technology of [employer].         Narrative:       Receiving Lab:->[facility]    Culture, Respiratory with Gram Stain [[id number]]  (Susceptibility) Collected:  04/05/19 1429    Order Status:  Completed Specimen:  Respiratory from Sputum Updated:  04/08/19 0943     Respiratory Culture --     METHICILLIN RESISTANT STAPHYLOCOCCUS AUREUS  Many       Respiratory Culture --     PSEUDOMONAS AERUGINOSA  Moderate       Gram Stain (Respiratory) <10 epithelial cells per low power field.     Gram Stain (Respiratory) Many WBC's     Gram Stain (Respiratory) Many Gram positive cocci     Gram Stain (Respiratory) Rare Gram positive rods     Gram Stain (Respiratory) Rare yeast    Influenza A & B by Molecular [[id number]] Collected:  04/05/19 1429    Order Status:  Completed Specimen:  Nasopharyngeal Swab Updated:  04/05/19 1521     Influenza A, Molecular Negative     Influenza B, Molecular Negative     Flu A & B Source Nasal swab          I have reviewed all pertinent labs within the past 24 hours.    Diagnostic Results:  Labs: Reviewed  X-Ray: Reviewed      Assessment/Plan:     * Staphylococcus aureus pneumonia  Blood cultures with NGTD. Previously with Coronavirus in 02/2019.  History of prior MRSA colonization.       Pseudomonas aerguinosa and MRSA isolated from respiratory culture as well as sinus culture. Tentative discharge home on 4/15 with plan to  complete 2 week total course of Vancomycin and Merrem.     Lung replaced by transplant  Patient s/p BLT secondary to CF in 2014 with known CARLOS EDUARDO and currently undergoing workup for re-transplantation.  Was scheduled for pulmonary rehab outpatient, but was deferred secondary to insurance coverage problems. PT ordered for deconditioning. Will need colonoscopy as outpatient workup. Will plan to continue current immunosuppression and prophylaxis.    Immunosuppression  Continue tacrolimus and prednisone. Currently off MMF secondary to recurrent infections. Will monitor daily tacrolimus levels and dose adjust accordingly.     Prophylactic antibiotic  Continue dapsone.     Sinusitis, chronic  Continue Flonase and Singulair. Followed by ENT outpatient and underwent OR washout 4/9. Continue sinus saline rinses per ENT. Sinus cultures positive for Pseudomonas and MRSA. ENT following, appreciate recs.     Chronic pain with opiate use  Established with pain management outpatient. Continue current regimen.     CF related Pancreatic insufficiency  Continue Creon with meals and snacks.         Jacqueline Dumont PA-C  Lung Transplant  [facility]

## 2019-04-12 NOTE — ASSESSMENT & PLAN NOTE
Continue Flonase and Singulair. Followed by ENT outpatient and underwent OR washout 4/9. Continue sinus saline rinses per ENT. Sinus cultures positive for Pseudomonas and MRSA. ENT following, appreciate recs.

## 2019-04-12 NOTE — PROGRESS NOTES
Received telephone order from Jacqueline Dumont PA-C, for a vancomycin trough before the next dose which is due at 0745 this morning.

## 2019-04-12 NOTE — PT/OT/SLP PROGRESS
Physical Therapy      Patient Name:  Juanita Ibarra   MRN:  [id number]    Patient not seen today secondary to pt resting soundly upon attempt  . Will follow-up .    Pradip Villeda, PTA 4/12/2019

## 2019-04-12 NOTE — PROGRESS NOTES
Notified ROXY DUARTE of the following:  Patient requesting to have port de-accessed to allow her skin to breathe. PA stated OK, will order hep lock. Will continue to monitor.

## 2019-04-12 NOTE — SUBJECTIVE & OBJECTIVE
Subjective:     Interval History: No acute events overnight. Remains with nausea. Denies any abdominal pain, vomiting, diarrhea, or constipation. Last BM yesterday. Recommended bland diet of toast, soup, etc to aid with nausea relief. PRN Zofran and Phenergan available. Pain well controlled with current outpatient regimen. Patient remains with adequate oxygenation on face tent as nasal cannula cannot be used due to recent sinus procedure. Patient reporting that she does not yet feel like she is ready to discharge at this time.     Continuous Infusions:  Scheduled Meds:   calcium-vitamin D3  1 tablet Oral BID WM    dapsone  100 mg Oral Daily    enoxaparin  40 mg Subcutaneous Daily    fluticasone  2 spray Each Nare Daily    folic acid  1,000 mcg Oral Daily    gabapentin  300 mg Oral TID    lipase-protease-amylase  4 capsule Oral TID WM    magnesium oxide  400 mg Oral BID    meropenem (MERREM) IVPB  1 g Intravenous Q8H    mometasone-formoterol  1 puff Inhalation BID    montelukast  10 mg Oral Daily    morphine  15 mg Oral BID    multivit-iron-FA-calcium-mins  1 tablet Oral BID    omeprazole  40 mg Oral BID    polyethylene glycol  17 g Oral BID    predniSONE  5 mg Oral Daily    QUEtiapine  25 mg Oral Daily    QUEtiapine  50 mg Oral QHS    sodium chloride  1 spray Each Nare TID    tacrolimus  2.5 mg Oral BID    topiramate  25 mg Oral BID    [START ON 4/13/2019] vancomycin (VANCOCIN) IVPB  750 mg Intravenous Q12H    venlafaxine  150 mg Oral QHS    venlafaxine  37.5 mg Oral QHS     PRN Meds:acetaminophen, butalbital-acetaminophen-caffeine -40 mg, diphenhydrAMINE, diphenhydrAMINE, hydrocortisone, hydrOXYzine HCl, lactulose, levalbuterol, LORazepam, magnesium sulfate IVPB **AND** magnesium sulfate IVPB, ondansetron, oxyCODONE, potassium chloride 10% **AND** potassium chloride 10% **AND** potassium chloride 10%, promethazine (PHENERGAN) IVPB, promethazine, sodium chloride 0.9%,  tiZANidine    Review of patient's allergies indicates:   Allergen Reactions    Albuterol Palpitations    Colistin Anaphylaxis    Vancomycin analogues      Infusion reaction that does not resolve with slowing    Neupogen [filgrastim] Other (See Comments)     Ostealgia after five daily doses of 300 mcg.      Bactrim [sulfamethoxazole-trimethoprim] Hives    Ceftazidime Hives     Pt stated can tolerate cefapine not ceftazidime    Ceftazidime     Dronabinol Other (See Comments)     Mental changes/hallucinations    Haldol [haloperidol lactate] Other (See Comments)     Seizure like activity    Nsaids (non-steroidal anti-inflammatory drug)      Cannot have due to lung transplant    Adhesive Rash     Cloth tape- please use tegaderm or paper tape    Aztreonam Rash    Ciprofloxacin Nausea And Vomiting     Projectile N/V, per patient.  Unwilling to retry therapy.       Review of Systems   Constitutional: Positive for activity change, appetite change and fatigue. Negative for chills and fever.   HENT: Positive for congestion, sinus pain and tinnitus. Negative for ear discharge, ear pain, rhinorrhea and sore throat.    Eyes: Negative for discharge and redness.   Respiratory: Positive for cough and shortness of breath. Negative for chest tightness and wheezing.    Cardiovascular: Negative for chest pain, palpitations and leg swelling.   Gastrointestinal: Positive for nausea. Negative for abdominal distention, abdominal pain, constipation, diarrhea and vomiting.   Endocrine: Negative for polydipsia and polyuria.   Genitourinary: Negative for difficulty urinating, dysuria, frequency and urgency.   Musculoskeletal: Positive for myalgias. Negative for arthralgias, neck pain and neck stiffness.   Skin: Positive for pallor. Negative for rash.   Allergic/Immunologic: Positive for immunocompromised state.   Neurological: Positive for headaches. Negative for dizziness, weakness, light-headedness and numbness.   Hematological:  Negative for adenopathy. Does not bruise/bleed easily.   Psychiatric/Behavioral: Negative for confusion, decreased concentration and hallucinations.     Objective:   Physical Exam   Constitutional: She is oriented to person, place, and time. She appears well-developed and well-nourished. She has a sickly appearance. No distress. Nasal cannula in place.   HENT:   Head: Normocephalic and atraumatic.   Right Ear: External ear normal.   Left Ear: External ear normal.   Nose: Nose normal.   Mouth/Throat: Oropharynx is clear and moist. No oropharyngeal exudate.   Eyes: Conjunctivae and EOM are normal. Right eye exhibits no discharge. Left eye exhibits no discharge.   Neck: Normal range of motion. Neck supple. No JVD present.   Cardiovascular: Normal rate, regular rhythm and normal heart sounds. Exam reveals no friction rub.   No murmur heard.  Pulmonary/Chest: Effort normal. Tachypnea noted. She has no decreased breath sounds. She has no wheezes. She has no rhonchi. She has no rales.   Abdominal: Soft. Bowel sounds are normal. She exhibits distension (mild). There is no tenderness.   Musculoskeletal: Normal range of motion. She exhibits no edema.   Lymphadenopathy:     She has no cervical adenopathy.   Neurological: She is alert and oriented to person, place, and time. No cranial nerve deficit.   Skin: Skin is warm and dry. Capillary refill takes less than 2 seconds. She is not diaphoretic. There is pallor.   Psychiatric: She has a normal mood and affect. Her behavior is normal. Judgment and thought content normal.   Nursing note and vitals reviewed.        Vital Signs (Most Recent):  Temp: 98.6 °F (37 °C) (04/12/19 0801)  Pulse: 89 (04/12/19 0801)  Resp: 18 (04/12/19 0801)  BP: 112/78 (04/12/19 0801)  SpO2: 96 % (04/12/19 0801) Vital Signs (24h Range):  Temp:  [98.4 °F (36.9 °C)-98.7 °F (37.1 °C)] 98.6 °F (37 °C)  Pulse:  [] 89  Resp:  [18] 18  SpO2:  [92 %-98 %] 96 %  BP: (111-135)/(69-85) 112/78     Weight: 69.5  kg (153 lb 3.5 oz)  Body mass index is 29.92 kg/m².      Intake/Output Summary (Last 24 hours) at 4/12/2019 1101  Last data filed at 4/11/2019 1500  Gross per 24 hour   Intake 830 ml   Output 0 ml   Net 830 ml       Significant Labs:  CBC:  Recent Labs   Lab 04/12/19  0500   WBC 6.60   RBC 3.00*   HGB 7.7*   HCT 27.6*      MCV 92   MCH 25.7*   MCHC 27.9*     BMP:  Recent Labs   Lab 04/12/19  0500      K 4.0      CO2 28   BUN 13   CREATININE 0.8   CALCIUM 8.1*      Tacrolimus Levels:  Recent Labs   Lab 04/12/19  0500   TACROLIMUS 3.9*     Microbiology:  Microbiology Results (last 7 days)     Procedure Component Value Units Date/Time    Aerobic culture [[id number]]  (Susceptibility) Collected:  04/09/19 1401    Order Status:  Completed Specimen:  Sinus from Nares, Right Updated:  04/11/19 1254     Aerobic Bacterial Culture --     METHICILLIN RESISTANT STAPHYLOCOCCUS AUREUS  Moderate       Aerobic Bacterial Culture --     PSEUDOMONAS AERUGINOSA  Few      Narrative:       Right Maxillary and Ethmoid sinus    Culture, Anaerobic [[id number]] Collected:  04/09/19 1401    Order Status:  Completed Specimen:  Sinus from Nares, Right Updated:  04/11/19 1111     Anaerobic Culture Culture in progress    Narrative:       Right Maxillary and Ethmoid sinus    AFB Culture & Smear [[id number]] Collected:  04/09/19 1401    Order Status:  Completed Specimen:  Sinus from Nares, Right Updated:  04/10/19 2127     AFB Culture & Smear Culture in progress     AFB CULTURE STAIN No acid fast bacilli seen.    Narrative:       Right Maxillary and Ethmoid sinus    Blood culture x two cultures. Draw prior to antibiotics. [[id number]] Collected:  04/05/19 1400    Order Status:  Completed Specimen:  Blood from Line, Subclavian, Right Updated:  04/10/19 1612     Blood Culture, Routine No growth after 5 days.    Narrative:       Aerobic and anaerobic    Blood culture x two cultures. Draw prior to antibiotics. [[id number]] Collected:   04/05/19 1429    Order Status:  Completed Specimen:  Blood from Line, Subclavian, Right Updated:  04/10/19 1612     Blood Culture, Routine No growth after 5 days.    Narrative:       Aerobic and anaerobic    Fungus culture [[id number]] Collected:  04/09/19 1401    Order Status:  Sent Specimen:  Sinus from Nares, Right Updated:  04/09/19 1417    Respiratory Viral Panel by PCR [facility]; Nasal Swab [[id number]] Collected:  04/05/19 1429    Order Status:  Completed Specimen:  Respiratory Updated:  04/09/19 1204     Respiratory Virus Panel, source Nasal Swab     RVP - Adenovirus Not Detected     Comment: Detects Serotypes B and E. Detection of Serotype C may   be limited. If Adenovirus infection is suspected and a   Not Detected result is returned the sample should be   re-tested for Adenovirus using an independent method  (e.g. [employer] Adenovirus Quantitative Real-Time  PCR test.          Enterovirus Not Detected     Comment: Cross-reactivity has been observed between certain Rhinovirus  strains and the Enterovirus assay.          Human Bocavirus Not Detected     Human Coronavirus Not Detected     Comment: The Human Coronavirus assay detects Human coronavirus types  229E, OC43,NL63 and HKU1.          RVP - Human Metapneumovirus (hMPV) Not Detected     RVP - Influenza A Not Detected     Influenza A - [id number] Not Detected     RVP - Influenza B Not Detected     Parainfluenza Not Detected     Respiratory Syncytial VirusVirus (RSV) A Not Detected     Comment: The Respiratory Syncytial Viral assay detects types A and B,  however it does not distinguish between the two.          RVP - Rhinovirus Not Detected     Comment: Cross-Reactivity has been observed between certain   Rhinovirus strains and the Enterovirus assay.  Target Enriched Mulitplex Polymerase Chain Reaction (TEM-PCR)  allows for the detection of multiple pathogens out of a single  reaction.  This test was developed and its performance   characteristics  determined by [employer].  It has not   been cleared or approved by the [place].Food and Drug Administration.  Results should be used in conjunction with clinical findings,   and should not form the sole basis for a diagnosis or treatment  decision.  TEM-PCR is a licensed technology of [employer].         Narrative:       Receiving Lab:->[facility]    Culture, Respiratory with Gram Stain [[id number]]  (Susceptibility) Collected:  04/05/19 1429    Order Status:  Completed Specimen:  Respiratory from Sputum Updated:  04/08/19 0943     Respiratory Culture --     METHICILLIN RESISTANT STAPHYLOCOCCUS AUREUS  Many       Respiratory Culture --     PSEUDOMONAS AERUGINOSA  Moderate       Gram Stain (Respiratory) <10 epithelial cells per low power field.     Gram Stain (Respiratory) Many WBC's     Gram Stain (Respiratory) Many Gram positive cocci     Gram Stain (Respiratory) Rare Gram positive rods     Gram Stain (Respiratory) Rare yeast    Influenza A & B by Molecular [[id number]] Collected:  04/05/19 1429    Order Status:  Completed Specimen:  Nasopharyngeal Swab Updated:  04/05/19 1521     Influenza A, Molecular Negative     Influenza B, Molecular Negative     Flu A & B Source Nasal swab          I have reviewed all pertinent labs within the past 24 hours.    Diagnostic Results:  Labs: Reviewed  X-Ray: Reviewed

## 2019-04-12 NOTE — ASSESSMENT & PLAN NOTE
Blood cultures with NGTD. Previously with Coronavirus in 02/2019.  History of prior MRSA colonization.       Pseudomonas aerguinosa and MRSA isolated from respiratory culture as well as sinus culture. Tentative discharge home on 4/15 with plan to complete 2 week total course of Vancomycin and Merrem.

## 2019-04-13 NOTE — PROGRESS NOTES
[facility]  Lung Transplant  Progress Note - Floor    Patient Name: Juanita Ibarra  MRN: [id number]  Admission Date: 4/5/2019  Hospital Length of Stay: 8 days  Post-Operative Day: 1766  Attending Physician: Jake Alvarez MD  Primary Care Provider: Primary Doctor No     Subjective:     Interval History: No acute events overnight. Remains with ongoing nausea. Denies any abdominal pain, vomiting, diarrhea, or constipation. Last BM yesterday and reports as normal. Patient reports that her LFTs typically rise with IV antibiotics. Will plan to monitor trend of LFTs over the next 24-48 hours and obtain abdominal ultrasound if patient develops abdominal pain or episodes of emesis. Patient remains with adequate oxygenation on face tent as nasal cannula cannot be used due to recent sinus procedure. Remains with cough and CARROLL although these are symptomatically improving. Patient noted to be sleeping throughout the day.     Continuous Infusions:  Scheduled Meds:   calcium-vitamin D3  1 tablet Oral BID WM    dapsone  100 mg Oral Daily    enoxaparin  40 mg Subcutaneous Daily    fluticasone  2 spray Each Nare Daily    folic acid  1,000 mcg Oral Daily    gabapentin  300 mg Oral TID    lipase-protease-amylase  4 capsule Oral TID WM    magnesium oxide  400 mg Oral BID    meropenem (MERREM) IVPB  1 g Intravenous Q8H    mometasone-formoterol  1 puff Inhalation BID    montelukast  10 mg Oral Daily    morphine  15 mg Oral BID    multivit-iron-FA-calcium-mins  1 tablet Oral BID    omeprazole  40 mg Oral BID    polyethylene glycol  17 g Oral BID    predniSONE  5 mg Oral Daily    QUEtiapine  25 mg Oral Daily    QUEtiapine  50 mg Oral QHS    sodium chloride  1 spray Each Nare TID    tacrolimus  2.5 mg Oral BID    topiramate  25 mg Oral BID    vancomycin (VANCOCIN) IVPB  750 mg Intravenous Q12H    venlafaxine  150 mg Oral QHS    venlafaxine  37.5 mg Oral QHS     PRN Meds:acetaminophen,  butalbital-acetaminophen-caffeine -40 mg, diphenhydrAMINE, diphenhydrAMINE, hydrocortisone, hydrOXYzine HCl, lactulose, levalbuterol, LORazepam, magnesium sulfate IVPB **AND** magnesium sulfate IVPB, ondansetron, oxyCODONE, potassium chloride 10% **AND** potassium chloride 10% **AND** potassium chloride 10%, promethazine (PHENERGAN) IVPB, promethazine, sodium chloride 0.9%, tiZANidine    Review of patient's allergies indicates:   Allergen Reactions    Albuterol Palpitations    Colistin Anaphylaxis    Vancomycin analogues      Infusion reaction that does not resolve with slowing    Neupogen [filgrastim] Other (See Comments)     Ostealgia after five daily doses of 300 mcg.      Bactrim [sulfamethoxazole-trimethoprim] Hives    Ceftazidime Hives     Pt stated can tolerate cefapine not ceftazidime    Ceftazidime     Dronabinol Other (See Comments)     Mental changes/hallucinations    Haldol [haloperidol lactate] Other (See Comments)     Seizure like activity    Nsaids (non-steroidal anti-inflammatory drug)      Cannot have due to lung transplant    Adhesive Rash     Cloth tape- please use tegaderm or paper tape    Aztreonam Rash    Ciprofloxacin Nausea And Vomiting     Projectile N/V, per patient.  Unwilling to retry therapy.       Review of Systems   Constitutional: Positive for activity change, appetite change and fatigue. Negative for chills and fever.   HENT: Positive for congestion, sinus pain and tinnitus. Negative for ear discharge, ear pain, rhinorrhea and sore throat.    Eyes: Negative for discharge and redness.   Respiratory: Positive for cough and shortness of breath. Negative for chest tightness and wheezing.    Cardiovascular: Negative for chest pain, palpitations and leg swelling.   Gastrointestinal: Positive for nausea. Negative for abdominal distention, abdominal pain, constipation, diarrhea and vomiting.   Endocrine: Negative for polydipsia and polyuria.   Genitourinary: Negative for  difficulty urinating, dysuria, frequency and urgency.   Musculoskeletal: Positive for myalgias. Negative for arthralgias, neck pain and neck stiffness.   Skin: Positive for pallor. Negative for rash.   Allergic/Immunologic: Positive for immunocompromised state.   Neurological: Positive for headaches. Negative for dizziness, weakness, light-headedness and numbness.   Hematological: Negative for adenopathy. Does not bruise/bleed easily.   Psychiatric/Behavioral: Negative for confusion, decreased concentration and hallucinations.     Objective:   Physical Exam   Constitutional: She is oriented to person, place, and time. She appears well-developed and well-nourished. She has a sickly appearance. No distress.   Face tent in place     HENT:   Head: Normocephalic and atraumatic.   Right Ear: External ear normal.   Left Ear: External ear normal.   Nose: Nose normal.   Mouth/Throat: Oropharynx is clear and moist. No oropharyngeal exudate.   Eyes: Conjunctivae and EOM are normal. Right eye exhibits no discharge. Left eye exhibits no discharge.   Neck: Normal range of motion. Neck supple. No JVD present.   Cardiovascular: Normal rate, regular rhythm and normal heart sounds. Exam reveals no friction rub.   No murmur heard.  Pulmonary/Chest: Effort normal. No tachypnea. She has no decreased breath sounds. She has no wheezes. She has no rhonchi. She has no rales.   Abdominal: Soft. Bowel sounds are normal. She exhibits distension (mild). There is no hepatomegaly. There is no tenderness.   Musculoskeletal: Normal range of motion. She exhibits no edema.   Lymphadenopathy:     She has no cervical adenopathy.   Neurological: She is alert and oriented to person, place, and time. No cranial nerve deficit.   Skin: Skin is warm and dry. Capillary refill takes less than 2 seconds. She is not diaphoretic. There is pallor.   Psychiatric: She has a normal mood and affect. Her behavior is normal. Judgment and thought content normal.    Nursing note and vitals reviewed.        Vital Signs (Most Recent):  Temp: 98 °F (36.7 °C) (04/13/19 0905)  Pulse: 85 (04/13/19 0905)  Resp: 17 (04/13/19 0905)  BP: 108/72 (04/13/19 0905)  SpO2: 95 % (04/13/19 0905) Vital Signs (24h Range):  Temp:  [98 °F (36.7 °C)-98.6 °F (37 °C)] 98 °F (36.7 °C)  Pulse:  [82-96] 85  Resp:  [16-18] 17  SpO2:  [94 %-96 %] 95 %  BP: (108-160)/(67-98) 108/72     Weight: 70.2 kg (154 lb 12.2 oz)  Body mass index is 30.23 kg/m².      Intake/Output Summary (Last 24 hours) at 4/13/2019 1151  Last data filed at 4/13/2019 0616  Gross per 24 hour   Intake 1010 ml   Output 0 ml   Net 1010 ml       Significant Labs:  CBC:  Recent Labs   Lab 04/13/19  0600   WBC 6.12   RBC 3.01*   HGB 7.9*   HCT 27.7*      MCV 92   MCH 26.2*   MCHC 28.5*     BMP:  Recent Labs   Lab 04/13/19  0600      K 4.3      CO2 29   BUN 14   CREATININE 0.8   CALCIUM 8.3*      Tacrolimus Levels:  Recent Labs   Lab 04/13/19  0600   TACROLIMUS 3.4*     Microbiology:  Microbiology Results (last 7 days)     Procedure Component Value Units Date/Time    Aerobic culture [[id number]]  (Susceptibility) Collected:  04/09/19 1401    Order Status:  Completed Specimen:  Sinus from Nares, Right Updated:  04/11/19 1254     Aerobic Bacterial Culture --     METHICILLIN RESISTANT STAPHYLOCOCCUS AUREUS  Moderate       Aerobic Bacterial Culture --     PSEUDOMONAS AERUGINOSA  Few      Narrative:       Right Maxillary and Ethmoid sinus    Culture, Anaerobic [[id number]] Collected:  04/09/19 1401    Order Status:  Completed Specimen:  Sinus from Nares, Right Updated:  04/11/19 1111     Anaerobic Culture Culture in progress    Narrative:       Right Maxillary and Ethmoid sinus    AFB Culture & Smear [[id number]] Collected:  04/09/19 1401    Order Status:  Completed Specimen:  Sinus from Nares, Right Updated:  04/10/19 2127     AFB Culture & Smear Culture in progress     AFB CULTURE STAIN No acid fast bacilli seen.    Narrative:        Right Maxillary and Ethmoid sinus    Blood culture x two cultures. Draw prior to antibiotics. [[id number]] Collected:  04/05/19 1400    Order Status:  Completed Specimen:  Blood from Line, Subclavian, Right Updated:  04/10/19 1612     Blood Culture, Routine No growth after 5 days.    Narrative:       Aerobic and anaerobic    Blood culture x two cultures. Draw prior to antibiotics. [[id number]] Collected:  04/05/19 1429    Order Status:  Completed Specimen:  Blood from Line, Subclavian, Right Updated:  04/10/19 1612     Blood Culture, Routine No growth after 5 days.    Narrative:       Aerobic and anaerobic    Fungus culture [[id number]] Collected:  04/09/19 1401    Order Status:  Sent Specimen:  Sinus from Nares, Right Updated:  04/09/19 1417    Respiratory Viral Panel by PCR [facility]; Nasal Swab [[id number]] Collected:  04/05/19 1429    Order Status:  Completed Specimen:  Respiratory Updated:  04/09/19 1204     Respiratory Virus Panel, source Nasal Swab     RVP - Adenovirus Not Detected     Comment: Detects Serotypes B and E. Detection of Serotype C may   be limited. If Adenovirus infection is suspected and a   Not Detected result is returned the sample should be   re-tested for Adenovirus using an independent method  (e.g. [employer] Adenovirus Quantitative Real-Time  PCR test.          Enterovirus Not Detected     Comment: Cross-reactivity has been observed between certain Rhinovirus  strains and the Enterovirus assay.          Human Bocavirus Not Detected     Human Coronavirus Not Detected     Comment: The Human Coronavirus assay detects Human coronavirus types  229E, OC43,NL63 and HKU1.          RVP - Human Metapneumovirus (hMPV) Not Detected     RVP - Influenza A Not Detected     Influenza A - D9U1-42 Not Detected     RVP - Influenza B Not Detected     Parainfluenza Not Detected     Respiratory Syncytial VirusVirus (RSV) A Not Detected     Comment: The Respiratory Syncytial Viral assay detects types A and  B,  however it does not distinguish between the two.          RVP - Rhinovirus Not Detected     Comment: Cross-Reactivity has been observed between certain   Rhinovirus strains and the Enterovirus assay.  Target Enriched Mulitplex Polymerase Chain Reaction (TEM-PCR)  allows for the detection of multiple pathogens out of a single  reaction.  This test was developed and its performance   characteristics determined by [employer].  It has not   been cleared or approved by the U.[place].Food and Drug Administration.  Results should be used in conjunction with clinical findings,   and should not form the sole basis for a diagnosis or treatment  decision.  TEM-PCR is a licensed technology of [employer].         Narrative:       Receiving Lab:->[facility]    Culture, Respiratory with Gram Stain [[id number]]  (Susceptibility) Collected:  04/05/19 1429    Order Status:  Completed Specimen:  Respiratory from Sputum Updated:  04/08/19 0943     Respiratory Culture --     METHICILLIN RESISTANT STAPHYLOCOCCUS AUREUS  Many       Respiratory Culture --     PSEUDOMONAS AERUGINOSA  Moderate       Gram Stain (Respiratory) <10 epithelial cells per low power field.     Gram Stain (Respiratory) Many WBC's     Gram Stain (Respiratory) Many Gram positive cocci     Gram Stain (Respiratory) Rare Gram positive rods     Gram Stain (Respiratory) Rare yeast          I have reviewed all pertinent labs within the past 24 hours.    Diagnostic Results:  Labs: Reviewed  X-Ray: Reviewed      Assessment/Plan:     * Staphylococcus aureus pneumonia  Blood cultures with NGTD. Previously with Coronavirus in 02/2019.  History of prior MRSA colonization.       Pseudomonas aerguinosa and MRSA isolated from respiratory culture as well as sinus culture. Tentative discharge home on 4/15 with plan to complete 2 week total course of Vancomycin and Meropenem.     Lung replaced by transplant  Patient s/p BLT secondary to CF in 2014 with known CARLOS EDUARDO and  currently undergoing workup for re-transplantation.  Was scheduled for pulmonary rehab outpatient, but was deferred secondary to insurance coverage problems. PT ordered for deconditioning while inpatient. Encourage ambulation throughout the day with assistance as tolerated.     Will need colonoscopy as outpatient workup. Will plan to continue current immunosuppression and prophylaxis.    Immunosuppression  Continue tacrolimus and prednisone. Currently off MMF secondary to recurrent infections. Will monitor daily tacrolimus levels and dose adjust accordingly.     Prophylactic antibiotic  Continue dapsone.     Sinusitis, chronic  Continue Flonase and Singulair. Followed by ENT outpatient and underwent OR washout 4/9. Continue sinus saline rinses per ENT. Sinus cultures positive for Pseudomonas and MRSA. ENT following, appreciate recs.     Chronic pain with opiate use  Established with pain management outpatient. Continue current regimen.     CF related Pancreatic insufficiency  Continue Creon with meals and snacks.     Transaminitis  Alk phos, ALT, and AST elevated. Patient known to have elevation of LFTs while on IV antibiotic therapy in the past. Will monitor serial LFTs over the next 24-48 hours and consider abdominal ultrasound if LFTs continue to acutely rise or patient develops abdominal pain and/or vomiting.         Jacqueline Dumont PA-C  Lung Transplant  [facility]

## 2019-04-13 NOTE — ASSESSMENT & PLAN NOTE
Blood cultures with NGTD. Previously with Coronavirus in 02/2019.  History of prior MRSA colonization.       Pseudomonas aerguinosa and MRSA isolated from respiratory culture as well as sinus culture. Tentative discharge home on 4/15 with plan to complete 2 week total course of Vancomycin and Meropenem.

## 2019-04-13 NOTE — SUBJECTIVE & OBJECTIVE
Subjective:     Interval History: No acute events overnight. Remains with ongoing nausea. Denies any abdominal pain, vomiting, diarrhea, or constipation. Last BM yesterday and reports as normal. Patient reports that her LFTs typically rise with IV antibiotics. Will plan to monitor trend of LFTs over the next 24-48 hours and obtain abdominal ultrasound if patient develops abdominal pain or episodes of emesis. Patient remains with adequate oxygenation on face tent as nasal cannula cannot be used due to recent sinus procedure. Remains with cough and CARROLL although these are symptomatically improving. Patient noted to be sleeping throughout the day.     Continuous Infusions:  Scheduled Meds:   calcium-vitamin D3  1 tablet Oral BID WM    dapsone  100 mg Oral Daily    enoxaparin  40 mg Subcutaneous Daily    fluticasone  2 spray Each Nare Daily    folic acid  1,000 mcg Oral Daily    gabapentin  300 mg Oral TID    lipase-protease-amylase  4 capsule Oral TID WM    magnesium oxide  400 mg Oral BID    meropenem (MERREM) IVPB  1 g Intravenous Q8H    mometasone-formoterol  1 puff Inhalation BID    montelukast  10 mg Oral Daily    morphine  15 mg Oral BID    multivit-iron-FA-calcium-mins  1 tablet Oral BID    omeprazole  40 mg Oral BID    polyethylene glycol  17 g Oral BID    predniSONE  5 mg Oral Daily    QUEtiapine  25 mg Oral Daily    QUEtiapine  50 mg Oral QHS    sodium chloride  1 spray Each Nare TID    tacrolimus  2.5 mg Oral BID    topiramate  25 mg Oral BID    vancomycin (VANCOCIN) IVPB  750 mg Intravenous Q12H    venlafaxine  150 mg Oral QHS    venlafaxine  37.5 mg Oral QHS     PRN Meds:acetaminophen, butalbital-acetaminophen-caffeine -40 mg, diphenhydrAMINE, diphenhydrAMINE, hydrocortisone, hydrOXYzine HCl, lactulose, levalbuterol, LORazepam, magnesium sulfate IVPB **AND** magnesium sulfate IVPB, ondansetron, oxyCODONE, potassium chloride 10% **AND** potassium chloride 10% **AND** potassium  chloride 10%, promethazine (PHENERGAN) IVPB, promethazine, sodium chloride 0.9%, tiZANidine    Review of patient's allergies indicates:   Allergen Reactions    Albuterol Palpitations    Colistin Anaphylaxis    Vancomycin analogues      Infusion reaction that does not resolve with slowing    Neupogen [filgrastim] Other (See Comments)     Ostealgia after five daily doses of 300 mcg.      Bactrim [sulfamethoxazole-trimethoprim] Hives    Ceftazidime Hives     Pt stated can tolerate cefapine not ceftazidime    Ceftazidime     Dronabinol Other (See Comments)     Mental changes/hallucinations    Haldol [haloperidol lactate] Other (See Comments)     Seizure like activity    Nsaids (non-steroidal anti-inflammatory drug)      Cannot have due to lung transplant    Adhesive Rash     Cloth tape- please use tegaderm or paper tape    Aztreonam Rash    Ciprofloxacin Nausea And Vomiting     Projectile N/V, per patient.  Unwilling to retry therapy.       Review of Systems   Constitutional: Positive for activity change, appetite change and fatigue. Negative for chills and fever.   HENT: Positive for congestion, sinus pain and tinnitus. Negative for ear discharge, ear pain, rhinorrhea and sore throat.    Eyes: Negative for discharge and redness.   Respiratory: Positive for cough and shortness of breath. Negative for chest tightness and wheezing.    Cardiovascular: Negative for chest pain, palpitations and leg swelling.   Gastrointestinal: Positive for nausea. Negative for abdominal distention, abdominal pain, constipation, diarrhea and vomiting.   Endocrine: Negative for polydipsia and polyuria.   Genitourinary: Negative for difficulty urinating, dysuria, frequency and urgency.   Musculoskeletal: Positive for myalgias. Negative for arthralgias, neck pain and neck stiffness.   Skin: Positive for pallor. Negative for rash.   Allergic/Immunologic: Positive for immunocompromised state.   Neurological: Positive for headaches.  Negative for dizziness, weakness, light-headedness and numbness.   Hematological: Negative for adenopathy. Does not bruise/bleed easily.   Psychiatric/Behavioral: Negative for confusion, decreased concentration and hallucinations.     Objective:   Physical Exam   Constitutional: She is oriented to person, place, and time. She appears well-developed and well-nourished. She has a sickly appearance. No distress.   Face tent in place     HENT:   Head: Normocephalic and atraumatic.   Right Ear: External ear normal.   Left Ear: External ear normal.   Nose: Nose normal.   Mouth/Throat: Oropharynx is clear and moist. No oropharyngeal exudate.   Eyes: Conjunctivae and EOM are normal. Right eye exhibits no discharge. Left eye exhibits no discharge.   Neck: Normal range of motion. Neck supple. No JVD present.   Cardiovascular: Normal rate, regular rhythm and normal heart sounds. Exam reveals no friction rub.   No murmur heard.  Pulmonary/Chest: Effort normal. No tachypnea. She has no decreased breath sounds. She has no wheezes. She has no rhonchi. She has no rales.   Abdominal: Soft. Bowel sounds are normal. She exhibits distension (mild). There is no hepatomegaly. There is no tenderness.   Musculoskeletal: Normal range of motion. She exhibits no edema.   Lymphadenopathy:     She has no cervical adenopathy.   Neurological: She is alert and oriented to person, place, and time. No cranial nerve deficit.   Skin: Skin is warm and dry. Capillary refill takes less than 2 seconds. She is not diaphoretic. There is pallor.   Psychiatric: She has a normal mood and affect. Her behavior is normal. Judgment and thought content normal.   Nursing note and vitals reviewed.        Vital Signs (Most Recent):  Temp: 98 °F (36.7 °C) (04/13/19 0905)  Pulse: 85 (04/13/19 0905)  Resp: 17 (04/13/19 0905)  BP: 108/72 (04/13/19 0905)  SpO2: 95 % (04/13/19 0905) Vital Signs (24h Range):  Temp:  [98 °F (36.7 °C)-98.6 °F (37 °C)] 98 °F (36.7 °C)  Pulse:   [82-96] 85  Resp:  [16-18] 17  SpO2:  [94 %-96 %] 95 %  BP: (108-160)/(67-98) 108/72     Weight: 70.2 kg (154 lb 12.2 oz)  Body mass index is 30.23 kg/m².      Intake/Output Summary (Last 24 hours) at 4/13/2019 1151  Last data filed at 4/13/2019 0616  Gross per 24 hour   Intake 1010 ml   Output 0 ml   Net 1010 ml       Significant Labs:  CBC:  Recent Labs   Lab 04/13/19  0600   WBC 6.12   RBC 3.01*   HGB 7.9*   HCT 27.7*      MCV 92   MCH 26.2*   MCHC 28.5*     BMP:  Recent Labs   Lab 04/13/19  0600      K 4.3      CO2 29   BUN 14   CREATININE 0.8   CALCIUM 8.3*      Tacrolimus Levels:  Recent Labs   Lab 04/13/19  0600   TACROLIMUS 3.4*     Microbiology:  Microbiology Results (last 7 days)     Procedure Component Value Units Date/Time    Aerobic culture [[id number]]  (Susceptibility) Collected:  04/09/19 1401    Order Status:  Completed Specimen:  Sinus from Nares, Right Updated:  04/11/19 1254     Aerobic Bacterial Culture --     METHICILLIN RESISTANT STAPHYLOCOCCUS AUREUS  Moderate       Aerobic Bacterial Culture --     PSEUDOMONAS AERUGINOSA  Few      Narrative:       Right Maxillary and Ethmoid sinus    Culture, Anaerobic [[id number]] Collected:  04/09/19 1401    Order Status:  Completed Specimen:  Sinus from Nares, Right Updated:  04/11/19 1111     Anaerobic Culture Culture in progress    Narrative:       Right Maxillary and Ethmoid sinus    AFB Culture & Smear [[id number]] Collected:  04/09/19 1401    Order Status:  Completed Specimen:  Sinus from Nares, Right Updated:  04/10/19 2127     AFB Culture & Smear Culture in progress     AFB CULTURE STAIN No acid fast bacilli seen.    Narrative:       Right Maxillary and Ethmoid sinus    Blood culture x two cultures. Draw prior to antibiotics. [[id number]] Collected:  04/05/19 1400    Order Status:  Completed Specimen:  Blood from Line, Subclavian, Right Updated:  04/10/19 1612     Blood Culture, Routine No growth after 5 days.    Narrative:        Aerobic and anaerobic    Blood culture x two cultures. Draw prior to antibiotics. [[id number]] Collected:  04/05/19 1429    Order Status:  Completed Specimen:  Blood from Line, Subclavian, Right Updated:  04/10/19 1612     Blood Culture, Routine No growth after 5 days.    Narrative:       Aerobic and anaerobic    Fungus culture [[id number]] Collected:  04/09/19 1401    Order Status:  Sent Specimen:  Sinus from Nares, Right Updated:  04/09/19 1417    Respiratory Viral Panel by PCR [facility]; Nasal Swab [[id number]] Collected:  04/05/19 1429    Order Status:  Completed Specimen:  Respiratory Updated:  04/09/19 1204     Respiratory Virus Panel, source Nasal Swab     RVP - Adenovirus Not Detected     Comment: Detects Serotypes B and E. Detection of Serotype C may   be limited. If Adenovirus infection is suspected and a   Not Detected result is returned the sample should be   re-tested for Adenovirus using an independent method  (e.g. [employer] love [employer] you Adenovirus Quantitative Real-Time  PCR test.          Enterovirus Not Detected     Comment: Cross-reactivity has been observed between certain Rhinovirus  strains and the Enterovirus assay.          Human Bocavirus Not Detected     Human Coronavirus Not Detected     Comment: The Human Coronavirus assay detects Human coronavirus types  229E, OC43,NL63 and HKU1.          RVP - Human Metapneumovirus (hMPV) Not Detected     RVP - Influenza A Not Detected     Influenza A - [id number] Not Detected     RVP - Influenza B Not Detected     Parainfluenza Not Detected     Respiratory Syncytial VirusVirus (RSV) A Not Detected     Comment: The Respiratory Syncytial Viral assay detects types A and B,  however it does not distinguish between the two.          RVP - Rhinovirus Not Detected     Comment: Cross-Reactivity has been observed between certain   Rhinovirus strains and the Enterovirus assay.  Target Enriched Mulitplex Polymerase Chain Reaction (TEM-PCR)  allows for the detection of multiple  pathogens out of a single  reaction.  This test was developed and its performance   characteristics determined by [employer].  It has not   been cleared or approved by the U.[place].Food and Drug Administration.  Results should be used in conjunction with clinical findings,   and should not form the sole basis for a diagnosis or treatment  decision.  TEM-PCR is a licensed technology of [employer].         Narrative:       Receiving Lab:->[facility]    Culture, Respiratory with Gram Stain [[id number]]  (Susceptibility) Collected:  04/05/19 1429    Order Status:  Completed Specimen:  Respiratory from Sputum Updated:  04/08/19 0943     Respiratory Culture --     METHICILLIN RESISTANT STAPHYLOCOCCUS AUREUS  Many       Respiratory Culture --     PSEUDOMONAS AERUGINOSA  Moderate       Gram Stain (Respiratory) <10 epithelial cells per low power field.     Gram Stain (Respiratory) Many WBC's     Gram Stain (Respiratory) Many Gram positive cocci     Gram Stain (Respiratory) Rare Gram positive rods     Gram Stain (Respiratory) Rare yeast          I have reviewed all pertinent labs within the past 24 hours.    Diagnostic Results:  Labs: Reviewed  X-Ray: Reviewed

## 2019-04-13 NOTE — ASSESSMENT & PLAN NOTE
Alk phos, ALT, and AST elevated. Patient known to have elevation of LFTs while on IV antibiotic therapy in the past. Will monitor serial LFTs over the next 24-48 hours and consider abdominal ultrasound if LFTs continue to acutely rise or patient develops abdominal pain and/or vomiting.

## 2019-04-15 NOTE — PROGRESS NOTES
Discharge Note: - Pt did not d/c as previously scheduled last week     SW followed up with pt re- assess needs for discharge. Pt scheduled to discharge home today with IV  antibiotics. LOKI contacted Jake with [employer] [phone]/ fx [phone] (Pt previously established with infusion company ) to confirm dc. Jake taught pt last Friday and states will have meds delivered to pts home in time for evening dose. Pt to have 2pm dose at [facility] prior to dc.. Pt report has O2 tanks in order to dc. Pt spouse Shawn to assist with dc. LOKI followed up with Sadia at [facility] ([phone]# [phone]/ [phone]# [phone]) to inform them of pt dc.      LOKI reviewed discharge plan with pt. Pt aware of, and involved in discharge plan. Pt to discharge home with the assistance her spouse/family. Pt reports coping adequately at this time.  Pt verbalized no additional needs or concerns at this time.  Contact information provided. SW to remain available.              Surgeon/Pathologist Verbiage (Will Incorporate Name Of Surgeon From Intro If Not Blank): operated in two distinct and integrated capacities as the surgeon and pathologist.

## 2019-04-15 NOTE — ASSESSMENT & PLAN NOTE
Blood cultures with NGTD. Previously with Coronavirus in 02/2019.  History of prior MRSA colonization.       Pseudomonas aerguinosa and MRSA isolated from respiratory culture as well as sinus culture. Tentative discharge home tomorrow with plan to complete 2 week total course of Vancomycin and Meropenem.

## 2019-04-15 NOTE — NURSING
After visit summary reviewed with pt. She verbalized understanding. Pt met with KEARA Calero RN (lung transplant coordinator). Port flushed with 20ml of NS. Pt to be discharged with port in place for home antibiotics. Pt voiced understanding of medications and follow up appts. Pt to leave when  arrives.

## 2019-04-15 NOTE — PROGRESS NOTES
[facility]  Lung Transplant  Progress Note - Floor    Patient Name: Juanita Ibarra  MRN: [id number]  Admission Date: 4/5/2019  Hospital Length of Stay: 9 days  Post-Operative Day: 1767  Attending Physician: Jake Alvarez MD  Primary Care Provider: Primary Doctor No     Subjective:     Interval History: No acute events overnight. Remains with ongoing nausea. Denies any abdominal pain, vomiting, diarrhea, or constipation. Last BM yesterday and reports as normal. LFTs on downtrend today. Able to remain off of oxygen at rest with adequate oxygenation. Denies cough. Reports CARROLL.  Plan for discharge tomorrow.    Continuous Infusions:  Scheduled Meds:   calcium-vitamin D3  1 tablet Oral BID WM    dapsone  100 mg Oral Daily    enoxaparin  40 mg Subcutaneous Daily    fluticasone  2 spray Each Nare Daily    folic acid  1,000 mcg Oral Daily    gabapentin  300 mg Oral TID    lipase-protease-amylase  4 capsule Oral TID WM    magnesium oxide  400 mg Oral BID    meropenem (MERREM) IVPB  1 g Intravenous Q8H    mometasone-formoterol  1 puff Inhalation BID    montelukast  10 mg Oral Daily    morphine  15 mg Oral BID    multivit-iron-FA-calcium-mins  1 tablet Oral BID    omeprazole  40 mg Oral BID    polyethylene glycol  17 g Oral BID    predniSONE  5 mg Oral Daily    QUEtiapine  25 mg Oral Daily    QUEtiapine  50 mg Oral QHS    sodium chloride  1 spray Each Nare TID    tacrolimus  2.5 mg Oral BID    topiramate  25 mg Oral BID    vancomycin (VANCOCIN) IVPB  750 mg Intravenous Q12H    venlafaxine  150 mg Oral QHS    venlafaxine  37.5 mg Oral QHS     PRN Meds:acetaminophen, butalbital-acetaminophen-caffeine -40 mg, diphenhydrAMINE, diphenhydrAMINE, hydrocortisone, hydrOXYzine HCl, lactulose, levalbuterol, LORazepam, magnesium sulfate IVPB **AND** magnesium sulfate IVPB, ondansetron, oxyCODONE, potassium chloride 10% **AND** potassium chloride 10% **AND** potassium chloride  10%, promethazine (PHENERGAN) IVPB, promethazine, sodium chloride 0.9%, tiZANidine    Review of patient's allergies indicates:   Allergen Reactions    Albuterol Palpitations    Colistin Anaphylaxis    Vancomycin analogues      Infusion reaction that does not resolve with slowing    Neupogen [filgrastim] Other (See Comments)     Ostealgia after five daily doses of 300 mcg.      Bactrim [sulfamethoxazole-trimethoprim] Hives    Ceftazidime Hives     Pt stated can tolerate cefapine not ceftazidime    Ceftazidime     Dronabinol Other (See Comments)     Mental changes/hallucinations    Haldol [haloperidol lactate] Other (See Comments)     Seizure like activity    Nsaids (non-steroidal anti-inflammatory drug)      Cannot have due to lung transplant    Adhesive Rash     Cloth tape- please use tegaderm or paper tape    Aztreonam Rash    Ciprofloxacin Nausea And Vomiting     Projectile N/V, per patient.  Unwilling to retry therapy.       Review of Systems   Constitutional: Positive for fatigue. Negative for activity change, appetite change, chills and fever.   HENT: Positive for congestion, rhinorrhea and sinus pain. Negative for ear discharge, ear pain, sore throat and tinnitus.    Eyes: Negative for discharge and redness.   Respiratory: Positive for shortness of breath. Negative for cough, chest tightness and wheezing.    Cardiovascular: Negative for chest pain, palpitations and leg swelling.   Gastrointestinal: Positive for nausea. Negative for abdominal distention, abdominal pain, constipation, diarrhea and vomiting.   Endocrine: Negative for polydipsia and polyuria.   Genitourinary: Negative for difficulty urinating, dysuria, frequency and urgency.   Musculoskeletal: Positive for myalgias. Negative for arthralgias, neck pain and neck stiffness.   Skin: Positive for pallor. Negative for rash.   Allergic/Immunologic: Positive for immunocompromised state.   Neurological: Negative for dizziness, weakness,  light-headedness, numbness and headaches.   Hematological: Negative for adenopathy. Does not bruise/bleed easily.   Psychiatric/Behavioral: Negative for confusion, decreased concentration and hallucinations.     Objective:   Physical Exam   Constitutional: She is oriented to person, place, and time. She appears well-developed and well-nourished. She has a sickly appearance. No distress.   Face tent in place     HENT:   Head: Normocephalic and atraumatic.   Right Ear: External ear normal.   Left Ear: External ear normal.   Nose: Nose normal.   Mouth/Throat: Oropharynx is clear and moist. No oropharyngeal exudate.   Eyes: Conjunctivae and EOM are normal. Right eye exhibits no discharge. Left eye exhibits no discharge.   Neck: Normal range of motion. Neck supple. No JVD present.   Cardiovascular: Normal rate, regular rhythm and normal heart sounds. Exam reveals no friction rub.   No murmur heard.  Pulmonary/Chest: Effort normal. No tachypnea. She has no decreased breath sounds. She has no wheezes. She has no rhonchi. She has no rales.   Abdominal: Soft. Bowel sounds are normal. She exhibits distension (mild). There is no hepatomegaly. There is no tenderness.   Musculoskeletal: Normal range of motion. She exhibits no edema.   Lymphadenopathy:     She has no cervical adenopathy.   Neurological: She is alert and oriented to person, place, and time. No cranial nerve deficit.   Skin: Skin is warm and dry. Capillary refill takes less than 2 seconds. She is not diaphoretic. There is pallor.   Psychiatric: She has a normal mood and affect. Her behavior is normal. Judgment and thought content normal.   Nursing note and vitals reviewed.        Vital Signs (Most Recent):  Temp: 98.4 °F (36.9 °C) (04/14/19 1752)  Pulse: 92 (04/14/19 1752)  Resp: 18 (04/14/19 1752)  BP: 111/68 (04/14/19 1752)  SpO2: (!) 92 % (04/14/19 1752) Vital Signs (24h Range):  Temp:  [98.1 °F (36.7 °C)-98.7 °F (37.1 °C)] 98.4 °F (36.9 °C)  Pulse:  [91-97]  92  Resp:  [18] 18  SpO2:  [92 %-96 %] 92 %  BP: (100-127)/(68-90) 111/68     Weight: 70.2 kg (154 lb 12.2 oz)  Body mass index is 30.23 kg/m².      Intake/Output Summary (Last 24 hours) at 4/14/2019 2050  Last data filed at 4/14/2019 1830  Gross per 24 hour   Intake 1800 ml   Output --   Net 1800 ml       Significant Labs:  CBC:  Recent Labs   Lab 04/13/19  0600   WBC 6.12   RBC 3.01*   HGB 7.9*   HCT 27.7*      MCV 92   MCH 26.2*   MCHC 28.5*     BMP:  Recent Labs   Lab 04/14/19  0600      K 4.3      CO2 30*   BUN 12   CREATININE 0.8   CALCIUM 8.6*      Tacrolimus Levels:  Recent Labs   Lab 04/13/19  0600   TACROLIMUS 3.4*     Microbiology:  Microbiology Results (last 7 days)     Procedure Component Value Units Date/Time    Aerobic culture [[id number]]  (Susceptibility) Collected:  04/09/19 1401    Order Status:  Completed Specimen:  Sinus from Nares, Right Updated:  04/11/19 1254     Aerobic Bacterial Culture --     METHICILLIN RESISTANT STAPHYLOCOCCUS AUREUS  Moderate       Aerobic Bacterial Culture --     PSEUDOMONAS AERUGINOSA  Few      Narrative:       Right Maxillary and Ethmoid sinus    Culture, Anaerobic [[id number]] Collected:  04/09/19 1401    Order Status:  Completed Specimen:  Sinus from Nares, Right Updated:  04/11/19 1111     Anaerobic Culture Culture in progress    Narrative:       Right Maxillary and Ethmoid sinus    AFB Culture & Smear [[id number]] Collected:  04/09/19 1401    Order Status:  Completed Specimen:  Sinus from Nares, Right Updated:  04/10/19 2127     AFB Culture & Smear Culture in progress     AFB CULTURE STAIN No acid fast bacilli seen.    Narrative:       Right Maxillary and Ethmoid sinus    Blood culture x two cultures. Draw prior to antibiotics. [[id number]] Collected:  04/05/19 1400    Order Status:  Completed Specimen:  Blood from Line, Subclavian, Right Updated:  04/10/19 1612     Blood Culture, Routine No growth after 5 days.    Narrative:       Aerobic and  anaerobic    Blood culture x two cultures. Draw prior to antibiotics. [[id number]] Collected:  04/05/19 1429    Order Status:  Completed Specimen:  Blood from Line, Subclavian, Right Updated:  04/10/19 1612     Blood Culture, Routine No growth after 5 days.    Narrative:       Aerobic and anaerobic    Fungus culture [[id number]] Collected:  04/09/19 1401    Order Status:  Sent Specimen:  Sinus from Nares, Right Updated:  04/09/19 1417    Respiratory Viral Panel by PCR [facility]; Nasal Swab [[id number]] Collected:  04/05/19 1429    Order Status:  Completed Specimen:  Respiratory Updated:  04/09/19 1204     Respiratory Virus Panel, source Nasal Swab     RVP - Adenovirus Not Detected     Comment: Detects Serotypes B and E. Detection of Serotype C may   be limited. If Adenovirus infection is suspected and a   Not Detected result is returned the sample should be   re-tested for Adenovirus using an independent method  (e.g. [employer] Adenovirus Quantitative Real-Time  PCR test.          Enterovirus Not Detected     Comment: Cross-reactivity has been observed between certain Rhinovirus  strains and the Enterovirus assay.          Human Bocavirus Not Detected     Human Coronavirus Not Detected     Comment: The Human Coronavirus assay detects Human coronavirus types  229E, OC43,NL63 and HKU1.          RVP - Human Metapneumovirus (hMPV) Not Detected     RVP - Influenza A Not Detected     Influenza A - O1O0-75 Not Detected     RVP - Influenza B Not Detected     Parainfluenza Not Detected     Respiratory Syncytial VirusVirus (RSV) A Not Detected     Comment: The Respiratory Syncytial Viral assay detects types A and B,  however it does not distinguish between the two.          RVP - Rhinovirus Not Detected     Comment: Cross-Reactivity has been observed between certain   Rhinovirus strains and the Enterovirus assay.  Target Enriched Mulitplex Polymerase Chain Reaction (TEM-PCR)  allows for the detection of multiple pathogens  out of a single  reaction.  This test was developed and its performance   characteristics determined by [employer].  It has not   been cleared or approved by the U.[place].Food and Drug Administration.  Results should be used in conjunction with clinical findings,   and should not form the sole basis for a diagnosis or treatment  decision.  TEM-PCR is a licensed technology of [employer].         Narrative:       Receiving Lab:->[facility]    Culture, Respiratory with Gram Stain [[id number]]  (Susceptibility) Collected:  04/05/19 1429    Order Status:  Completed Specimen:  Respiratory from Sputum Updated:  04/08/19 0943     Respiratory Culture --     METHICILLIN RESISTANT STAPHYLOCOCCUS AUREUS  Many       Respiratory Culture --     PSEUDOMONAS AERUGINOSA  Moderate       Gram Stain (Respiratory) <10 epithelial cells per low power field.     Gram Stain (Respiratory) Many WBC's     Gram Stain (Respiratory) Many Gram positive cocci     Gram Stain (Respiratory) Rare Gram positive rods     Gram Stain (Respiratory) Rare yeast          I have reviewed all pertinent labs within the past 24 hours.    Diagnostic Results:  Labs: Reviewed  X-Ray: Reviewed      Assessment/Plan:     * Staphylococcus aureus pneumonia  Blood cultures with NGTD. Previously with Coronavirus in 02/2019.  History of prior MRSA colonization.       Pseudomonas aerguinosa and MRSA isolated from respiratory culture as well as sinus culture. Tentative discharge home tomorrow with plan to complete 2 week total course of Vancomycin and Meropenem.     Lung replaced by transplant  Patient s/p BLT secondary to CF in 2014 with known CARLOS EDUARDO and currently undergoing workup for re-transplantation.  Was scheduled for pulmonary rehab outpatient, but was deferred secondary to insurance coverage problems. PT ordered for deconditioning while inpatient. Encourage ambulation throughout the day with assistance as tolerated.     Will need colonoscopy as outpatient  workup. Will plan to continue current immunosuppression and prophylaxis.    Immunosuppression  Continue tacrolimus and prednisone. Currently off MMF secondary to recurrent infections. Will monitor daily tacrolimus levels and dose adjust accordingly.     Prophylactic antibiotic  Continue dapsone.     Sinusitis, chronic  Continue Flonase and Singulair. Followed by ENT outpatient and underwent OR washout 4/9. Continue sinus saline rinses per ENT. Sinus cultures positive for Pseudomonas and MRSA. ENT following, appreciate recs.     Chronic pain with opiate use  Established with pain management outpatient. Continue current regimen.     CF related Pancreatic insufficiency  Continue Creon with meals and snacks.     Transaminitis  Alk phos, ALT, and AST decreasing from yesterday's leels. Patient known to have elevation of LFTs while on IV antibiotic therapy in the past. Will monitor serial LFTs over the next 24 hours and consider abdominal ultrasound if LFTs continue to acutely rise or patient develops abdominal pain and/or vomiting.         Jacqueline Dumont PA-C  Lung Transplant  [facility]

## 2019-04-15 NOTE — ASSESSMENT & PLAN NOTE
Alk phos, ALT, and AST decreasing from yesterday's leels. Patient known to have elevation of LFTs while on IV antibiotic therapy in the past. Will monitor serial LFTs over the next 24 hours and consider abdominal ultrasound if LFTs continue to acutely rise or patient develops abdominal pain and/or vomiting.

## 2019-04-15 NOTE — DISCHARGE SUMMARY
"[facility]  Lung Transplant  Discharge Summary      Patient Name: Juanita Ibarra  MRN: [id number]  Admission Date: 4/5/2019  Hospital Length of Stay: 10 days  Discharge Date and Time: 04/15/2019 11:41 AM  Attending Physician: Jake Alvarez MD   Discharging Provider: Jacqueline Dumont PA-C  Primary Care Provider: Primary Doctor No     HPI: Mrs. 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 Patient presents to the ED today due to worsening complaints of chest congestion, dyspnea, and sinus congestion. Patient reports completion of 14 day course of IV cefepime around 3/19/2019 and initially noted symptomatic improvement with stable oxygenation following treatment for approximately one week. However, over the past week patient noticed productive cough with brown to yellow sputum production, increasing oxygen requirements to 3L via NC, and pleuritic chest pain triggered by tussive episodes. Patient reports that today her cough as been more dry and states that sputum production is not currently possible secondary to pleuritic chest pain. Patient also endorses complaints of green rhinorrhea, worsening sinus congestion, post-nasal drainage, and bilateral "ringing" /tinnitus in her ears. Patient endorses sinus headaches, increasing malaise/fatigue, generalized myalgias, increasing nausea, and poor PO intake. Denies any abdominal pain, diarrhea, constipation. Denies any sore throat, ear discharge. She reports being informed by her PCP that her tympanostomy tubes appear to be "falling out". Patient reports last ENT visit in January with Dr. Olsen.      In ED, WBC and procalcitonin were WNL; however, patient was noted to be febrile with Tmax of 100.3 and tachycardia to low 100s. Patient with tachypnea and O2 sats of 94% on 4L via NC. Of note, patient did have Coronavirus " isolated from RVP on 2/26/2019. Will plan for admission to begin IV cefepime and vancomycin to monitor patient's status.     Procedure(s) (LRB):  FESS, USING COMPUTER-ASSISTED NAVIGATION (N/A)  MAXILLARY ANTROSTOMY  ETHMOIDECTOMY (Bilateral)  SPHENOIDECTOMY (Bilateral)     Hospital Course: Patient with uneventful hospitalization. Patient underwent FESS on 4/9 without complications per ENT. Sinus cultures and respiratory cultures positive for Pseudomonas and MRSA.     * Staphylococcus aureus pneumonia  Blood cultures with NGTD. Pseudomonas aerguinosa and MRSA isolated from respiratory culture as well as sinus cultures. Patient to complete course of vancomycin on 4/18. Patient to complete course of meropenem on 4/21 for Pseudomonas isolated from sinus and respiratory culture.      Lung replaced by transplant  Patient s/p BLT secondary to CF in 2014 with known CARLOS EDAURDO and currently undergoing workup for re-transplantation.  Plan for physical therapy as outpatient to avoid deconditioning.      Will need colonoscopy as outpatient workup. Plan for outpatient follow-up on 5/1/2019 with Dr. Morin in lung transplant clinic.      Immunosuppression  Continue current dosing of tacrolimus and prednisone. MMF resumed at 500 mg BID.      Prophylactic antibiotic  Continue dapsone.      Sinusitis, chronic  Continue Flonase and Singulair. Followed by ENT outpatient and underwent OR washout 4/9. Continue sinus saline rinses per ENT. Sinus cultures positive for Pseudomonas and MRSA. Plan for ENT follow up as outpatient.     Chronic pain with opiate use  Established with pain management outpatient. Continue current regimen and follow up. One time prescription ordered for refill of Fioricet. Further refills to be deferred to pain management or PCP.      CF related Pancreatic insufficiency  Continue Creon with meals and snacks.      Transaminitis  Will monitor LFTs on repeat CMP next week.            Consults (From admission, onward)         Status Ordering Provider     Inpatient consult to ENT  Once     Provider:  (Not yet assigned)    Completed SARIKA SHERMAN     Inpatient consult to Lung Transplant  Once     Provider:  (Not yet assigned)    Completed VIVIEN TIRADO          Significant Diagnostic Studies: Labs: All labs within the past 24 hours have been reviewed    Pending Diagnostic Studies:     Procedure Component Value Units Date/Time    Vancomycin, random [[id number]] Collected:  04/13/19 0600    Order Status:  Sent Lab Status:  No result     Specimen:  Blood         Final Active Diagnoses:    Diagnosis Date Noted POA    PRINCIPAL PROBLEM:  Staphylococcus aureus pneumonia [J15.211] 04/05/2019 Yes    Lung replaced by transplant [Z94.2] 05/26/2015 Not Applicable    Immunosuppression [D89.9] 06/12/2014 Yes    Prophylactic antibiotic [Z79.2] 06/30/2014 Not Applicable    Sinusitis, chronic [J32.9] 07/26/2013 Yes    Chronic pain with opiate use [G89.29] 12/20/2013 Yes    CF related Pancreatic insufficiency [K86.89] 12/20/2013 Yes    Transaminitis [R74.0] 03/28/2014 Unknown    Lung transplant status [Z94.2] 04/09/2019 Not Applicable    Acute otitis externa of left ear [H60.502] 04/06/2019 Yes      Problems Resolved During this Admission:      Discharged Condition: stable    Disposition: Home or Self Care    Medications:  Reconciled Home Medications:      Medication List      CHANGE how you take these medications    diphenhydrAMINE 50 MG tablet  Commonly known as:  BENADRYL  Take 1 tablet (50 mg total) by mouth every 6 (six) hours as needed for Itching.  What changed:  Another medication with the same name was removed. Continue taking this medication, and follow the directions you see here.     lipase-protease-amylase 36,000-114,000- 180,000 unit Cpdr  Commonly known as:  CREON  Take 4 capsules by mouth 3 (three) times daily with meals. Take 3 with snacks prn.  What changed:    · how much to take  · additional instructions      omeprazole 40 MG capsule  Commonly known as:  PRILOSEC  Take 1 capsule by mouth once daily  What changed:  Another medication with the same name was removed. Continue taking this medication, and follow the directions you see here.        CONTINUE taking these medications    amLODIPine 10 MG tablet  Commonly known as:  NORVASC  Take 1 tablet (10 mg total) by mouth once daily.     butalbital-acetaminophen-caffeine -40 mg -40 mg per tablet  Commonly known as:  FIORICET, ESGIC  Take 1 tablet by mouth every 6 (six) hours as needed for Pain.     CENTRUM FLAVOR BURST ADULT Chew  Generic drug:  multivitamin-minerals no.55     dapsone 100 MG Tab  Take 1 tablet (100 mg total) by mouth once daily.     DULERA 100-5 mcg/actuation Hfaa  Generic drug:  mometasone-formoterol  INHALE 1 PUFF BY MOUTH TWICE DAILY     fexofenadine 180 MG tablet  Commonly known as:  ALLEGRA  Take 180 mg by mouth once daily.     fluconazole 150 MG Tab  Commonly known as:  DIFLUCAN  TAKE 1 TABLET BY MOUTH EVERY WEEK     fluticasone 50 mcg/actuation nasal spray  Commonly known as:  FLONASE  INSERT 2 SPRAYS IN EACH NOSTRIL DAILY     folic acid 1 MG tablet  Commonly known as:  FOLVITE  Take 1 tablet (1,000 mcg total) by mouth once daily.     gabapentin 300 MG capsule  Commonly known as:  NEURONTIN  Take 1 capsule (300 mg total) by mouth 3 (three) times daily.     inhalation spacing device  Commonly known as:  PROCHAMBER  USE AS DIRECTED     levalbuterol 1.25 mg/3 mL nebulizer solution  Commonly known as:  XOPENEX  Take 3 mLs (1.25 mg total) by nebulization every 8 (eight) hours as needed for Wheezing or Shortness of Breath. Rescue     levalbuterol 45 mcg/actuation inhaler  Commonly known as:  XOPENEX HFA  Inhale 1-2 puffs into the lungs.     LORazepam 2 MG Tab  Commonly known as:  ATIVAN  Take 1 tablet by mouth every 8 hours as needed for ANXIETY     magnesium oxide 400 mg (241.3 mg magnesium) tablet  Commonly known as:  MAG-OX  Take 1 tablet  (400 mg total) by mouth 2 (two) times daily.     metoprolol tartrate 25 MG tablet  Commonly known as:  LOPRESSOR  Take 1 tablet (25 mg total) by mouth 2 (two) times daily.     montelukast 10 mg tablet  Commonly known as:  SINGULAIR  Take 1 tablet (10 mg total) by mouth once daily.     morphine 15 MG 12 hr tablet  Commonly known as:  MS CONTIN  Take 15 mg by mouth 2 (two) times daily.     multivit,min52-folic-vitK-cQ10 100-700-10 mcg-mcg-mg Cap cap  Commonly known as:  AQUADEKS  1 tab twice daily     mycophenolate 250 mg Cap  Commonly known as:  CELLCEPT  Take 2 capsules (500 mg total) by mouth 2 (two) times daily.     ondansetron 8 MG tablet  Commonly known as:  ZOFRAN  Take 1 tablet (8 mg total) by mouth every 12 (twelve) hours as needed for Nausea.     oxyCODONE 5 MG immediate release tablet  Commonly known as:  ROXICODONE  Take 10 mg by mouth every 4 (four) hours as needed for Pain.     OYSTER SHELL CALCIUM-VIT D3 500 mg(1,250mg) -200 unit per tablet  Generic drug:  calcium-vitamin D3  Take 1 tablet by mouth 2 (two) times daily with meals.     polyethylene glycol 17 gram Pwpk  Commonly known as:  GLYCOLAX  Take 17 g by mouth 2 (two) times daily.     predniSONE 5 MG tablet  Commonly known as:  DELTASONE  Take 1 tablet (5 mg total) by mouth once daily.     promethazine 25 MG tablet  Commonly known as:  PHENERGAN  Take 1 tablet (25 mg total) by mouth every 6 (six) hours as needed for Nausea.     QUEtiapine 25 MG Tab  Commonly known as:  SEROQUEL  Take 1 tablet by mouth in morning and 2 tablets at bedtime     * tacrolimus 1 MG Cap  Commonly known as:  PROGRAF  Take 2 capsules (2 mg total) by mouth every 12 (twelve) hours. Daily doses: 2.5 mg twice daily.     * tacrolimus 0.5 MG Cap  Commonly known as:  PROGRAF  Take 1 capsule (0.5 mg total) by mouth every 12 (twelve) hours.     tiZANidine 4 MG tablet  Commonly known as:  ZANAFLEX  TAKE 2 TABLETS BY MOUTH EVERY 6 HOURS AS NEEDED     topiramate 25 MG tablet  Commonly  known as:  TOPAMAX  Take 1 tablet (25 mg total) by mouth 2 (two) times daily.     * venlafaxine 150 MG Cp24  Commonly known as:  EFFEXOR-XR  Take 1 capsule (150 mg total) by mouth once daily.     * venlafaxine 37.5 MG 24 hr capsule  Commonly known as:  EFFEXOR-XR  Take 1 capsule (37.5 mg total) by mouth once daily.         * This list has 4 medication(s) that are the same as other medications prescribed for you. Read the directions carefully, and ask your doctor or other care provider to review them with you.            STOP taking these medications    acetaminophen 500 MG tablet  Commonly known as:  TYLENOL     clindamycin 300 MG capsule  Commonly known as:  CLEOCIN        \  Patient Instructions:      Ambulatory Referral to Infusion Dept   Referral Priority: Routine Referral Type: Consultation   Referral Reason: Specialty Services Required   Requested Specialty: IV Infusion   Number of Visits Requested: 1     Diet Adult Regular     Notify your health care provider if you experience any of the following:  temperature >100.4     Notify your health care provider if you experience any of the following:     Notify your health care provider if you experience any of the following:  increased confusion or weakness     Notify your health care provider if you experience any of the following:  persistent dizziness, light-headedness, or visual disturbances     Notify your health care provider if you experience any of the following:  worsening rash     Notify your health care provider if you experience any of the following:  severe persistent headache     Notify your health care provider if you experience any of the following:  difficulty breathing or increased cough     Notify your health care provider if you experience any of the following:  redness, tenderness, or signs of infection (pain, swelling, redness, odor or green/yellow discharge around incision site)     Notify your health care provider if you experience any of  the following:  severe uncontrolled pain     Notify your health care provider if you experience any of the following:  persistent nausea and vomiting or diarrhea     Activity as tolerated     Follow Up:  Follow-up Information     KEAGAN Burks MD In 1 week.    Specialty:  Otolaryngology  Why:  endonasal debridement  Contact information:  [address]  [facility] [address]  [phone]             Robinson Gallegos MD In 1 month.    Specialty:  Otolaryngology  Why:  ear check  Contact information:  [address]  [facility] [address]  [phone]             Francisca Morin DO On 5/1/2019.    Specialties:  Pulmonary Disease, Critical Care Medicine  Contact information:  [address]  [facility] [address]  [phone]                   Jacqueline Dumont PA-C  Lung Transplant  [facility]

## 2019-04-15 NOTE — ASSESSMENT & PLAN NOTE
Patient s/p BLT secondary to CF in 2014 with known CARLOS EDUARDO and currently undergoing workup for re-transplantation.  Was scheduled for pulmonary rehab outpatient, but was deferred secondary to insurance coverage problems. PT ordered for deconditioning while inpatient. Encourage ambulation throughout the day with assistance as tolerated.     Will need colonoscopy as outpatient workup. Will plan to continue current immunosuppression and prophylaxis.

## 2019-04-15 NOTE — PROGRESS NOTES
Patient being discharged home today.  Discharge instructions and outpatient plan of care were reviewed with her as follows:  1.  Treatment for LRTI:  - MRSA:  Vancomycin 750 mg IV via port-a-cath every 12 hours, with each dose infused over 3 hours, through April 18.  Thirty minutes before each vancomycin dose, diphenhydramine 50 mg premedication should be administered.   - Pseudomonas:  Meropenem 1 gram IV via port-a-cath every 8 hours through April 21.  Patient will self-administer all IV medications, including diphenhydramine which she has given herself in the past without difficulty.  Patient will set her own administration times for the IV antibiotics.  Reminded her of the time frames for which vancomycin doses and meropenem doses must be .  2.  Labs (CBC and CMP) on Monday, April 22.  Instructed patient to report to the 2nd floor lab for non-fasting blood work.  3.  Return for a chest xray, labs, spirometry and lung transplant clinic visit on Wednesday, May 1, 2019.  Reviewed times, locations and special instructions (e.g., take morning Prograf dose after his blood is drawn) for these appointments.  4. Colonoscopy, as part of post-transplant monitoring for patients with CF, will be scheduled in the outpatient setting.  Provided verbal and written instructions re: this discharge plan.  The patient asked questions, which were answered to her satisfaction.  She repeated the discharge plan back to me correctly and demonstrated her readiness for discharge.

## 2019-04-15 NOTE — SUBJECTIVE & OBJECTIVE
Subjective:     Interval History: No acute events overnight. Remains with ongoing nausea. Denies any abdominal pain, vomiting, diarrhea, or constipation. Last BM yesterday and reports as normal. LFTs on downtrend today. Able to remain off of oxygen at rest with adequate oxygenation. Denies cough. Reports CARROLL.  Plan for discharge tomorrow.    Continuous Infusions:  Scheduled Meds:   calcium-vitamin D3  1 tablet Oral BID WM    dapsone  100 mg Oral Daily    enoxaparin  40 mg Subcutaneous Daily    fluticasone  2 spray Each Nare Daily    folic acid  1,000 mcg Oral Daily    gabapentin  300 mg Oral TID    lipase-protease-amylase  4 capsule Oral TID WM    magnesium oxide  400 mg Oral BID    meropenem (MERREM) IVPB  1 g Intravenous Q8H    mometasone-formoterol  1 puff Inhalation BID    montelukast  10 mg Oral Daily    morphine  15 mg Oral BID    multivit-iron-FA-calcium-mins  1 tablet Oral BID    omeprazole  40 mg Oral BID    polyethylene glycol  17 g Oral BID    predniSONE  5 mg Oral Daily    QUEtiapine  25 mg Oral Daily    QUEtiapine  50 mg Oral QHS    sodium chloride  1 spray Each Nare TID    tacrolimus  2.5 mg Oral BID    topiramate  25 mg Oral BID    vancomycin (VANCOCIN) IVPB  750 mg Intravenous Q12H    venlafaxine  150 mg Oral QHS    venlafaxine  37.5 mg Oral QHS     PRN Meds:acetaminophen, butalbital-acetaminophen-caffeine -40 mg, diphenhydrAMINE, diphenhydrAMINE, hydrocortisone, hydrOXYzine HCl, lactulose, levalbuterol, LORazepam, magnesium sulfate IVPB **AND** magnesium sulfate IVPB, ondansetron, oxyCODONE, potassium chloride 10% **AND** potassium chloride 10% **AND** potassium chloride 10%, promethazine (PHENERGAN) IVPB, promethazine, sodium chloride 0.9%, tiZANidine    Review of patient's allergies indicates:   Allergen Reactions    Albuterol Palpitations    Colistin Anaphylaxis    Vancomycin analogues      Infusion reaction that does not resolve with slowing    Neupogen  [filgrastim] Other (See Comments)     Ostealgia after five daily doses of 300 mcg.      Bactrim [sulfamethoxazole-trimethoprim] Hives    Ceftazidime Hives     Pt stated can tolerate cefapine not ceftazidime    Ceftazidime     Dronabinol Other (See Comments)     Mental changes/hallucinations    Haldol [haloperidol lactate] Other (See Comments)     Seizure like activity    Nsaids (non-steroidal anti-inflammatory drug)      Cannot have due to lung transplant    Adhesive Rash     Cloth tape- please use tegaderm or paper tape    Aztreonam Rash    Ciprofloxacin Nausea And Vomiting     Projectile N/V, per patient.  Unwilling to retry therapy.       Review of Systems   Constitutional: Positive for fatigue. Negative for activity change, appetite change, chills and fever.   HENT: Positive for congestion, rhinorrhea and sinus pain. Negative for ear discharge, ear pain, sore throat and tinnitus.    Eyes: Negative for discharge and redness.   Respiratory: Positive for shortness of breath. Negative for cough, chest tightness and wheezing.    Cardiovascular: Negative for chest pain, palpitations and leg swelling.   Gastrointestinal: Positive for nausea. Negative for abdominal distention, abdominal pain, constipation, diarrhea and vomiting.   Endocrine: Negative for polydipsia and polyuria.   Genitourinary: Negative for difficulty urinating, dysuria, frequency and urgency.   Musculoskeletal: Positive for myalgias. Negative for arthralgias, neck pain and neck stiffness.   Skin: Positive for pallor. Negative for rash.   Allergic/Immunologic: Positive for immunocompromised state.   Neurological: Negative for dizziness, weakness, light-headedness, numbness and headaches.   Hematological: Negative for adenopathy. Does not bruise/bleed easily.   Psychiatric/Behavioral: Negative for confusion, decreased concentration and hallucinations.     Objective:   Physical Exam   Constitutional: She is oriented to person, place, and time.  She appears well-developed and well-nourished. She has a sickly appearance. No distress.   Face tent in place     HENT:   Head: Normocephalic and atraumatic.   Right Ear: External ear normal.   Left Ear: External ear normal.   Nose: Nose normal.   Mouth/Throat: Oropharynx is clear and moist. No oropharyngeal exudate.   Eyes: Conjunctivae and EOM are normal. Right eye exhibits no discharge. Left eye exhibits no discharge.   Neck: Normal range of motion. Neck supple. No JVD present.   Cardiovascular: Normal rate, regular rhythm and normal heart sounds. Exam reveals no friction rub.   No murmur heard.  Pulmonary/Chest: Effort normal. No tachypnea. She has no decreased breath sounds. She has no wheezes. She has no rhonchi. She has no rales.   Abdominal: Soft. Bowel sounds are normal. She exhibits distension (mild). There is no hepatomegaly. There is no tenderness.   Musculoskeletal: Normal range of motion. She exhibits no edema.   Lymphadenopathy:     She has no cervical adenopathy.   Neurological: She is alert and oriented to person, place, and time. No cranial nerve deficit.   Skin: Skin is warm and dry. Capillary refill takes less than 2 seconds. She is not diaphoretic. There is pallor.   Psychiatric: She has a normal mood and affect. Her behavior is normal. Judgment and thought content normal.   Nursing note and vitals reviewed.        Vital Signs (Most Recent):  Temp: 98.4 °F (36.9 °C) (04/14/19 1752)  Pulse: 92 (04/14/19 1752)  Resp: 18 (04/14/19 1752)  BP: 111/68 (04/14/19 1752)  SpO2: (!) 92 % (04/14/19 1752) Vital Signs (24h Range):  Temp:  [98.1 °F (36.7 °C)-98.7 °F (37.1 °C)] 98.4 °F (36.9 °C)  Pulse:  [91-97] 92  Resp:  [18] 18  SpO2:  [92 %-96 %] 92 %  BP: (100-127)/(68-90) 111/68     Weight: 70.2 kg (154 lb 12.2 oz)  Body mass index is 30.23 kg/m².      Intake/Output Summary (Last 24 hours) at 4/14/2019 2050  Last data filed at 4/14/2019 1830  Gross per 24 hour   Intake 1800 ml   Output --   Net 1800 ml        Significant Labs:  CBC:  Recent Labs   Lab 04/13/19  0600   WBC 6.12   RBC 3.01*   HGB 7.9*   HCT 27.7*      MCV 92   MCH 26.2*   MCHC 28.5*     BMP:  Recent Labs   Lab 04/14/19  0600      K 4.3      CO2 30*   BUN 12   CREATININE 0.8   CALCIUM 8.6*      Tacrolimus Levels:  Recent Labs   Lab 04/13/19  0600   TACROLIMUS 3.4*     Microbiology:  Microbiology Results (last 7 days)     Procedure Component Value Units Date/Time    Aerobic culture [[id number]]  (Susceptibility) Collected:  04/09/19 1401    Order Status:  Completed Specimen:  Sinus from Nares, Right Updated:  04/11/19 1254     Aerobic Bacterial Culture --     METHICILLIN RESISTANT STAPHYLOCOCCUS AUREUS  Moderate       Aerobic Bacterial Culture --     PSEUDOMONAS AERUGINOSA  Few      Narrative:       Right Maxillary and Ethmoid sinus    Culture, Anaerobic [[id number]] Collected:  04/09/19 1401    Order Status:  Completed Specimen:  Sinus from Nares, Right Updated:  04/11/19 1111     Anaerobic Culture Culture in progress    Narrative:       Right Maxillary and Ethmoid sinus    AFB Culture & Smear [[id number]] Collected:  04/09/19 1401    Order Status:  Completed Specimen:  Sinus from Nares, Right Updated:  04/10/19 2127     AFB Culture & Smear Culture in progress     AFB CULTURE STAIN No acid fast bacilli seen.    Narrative:       Right Maxillary and Ethmoid sinus    Blood culture x two cultures. Draw prior to antibiotics. [[id number]] Collected:  04/05/19 1400    Order Status:  Completed Specimen:  Blood from Line, Subclavian, Right Updated:  04/10/19 1612     Blood Culture, Routine No growth after 5 days.    Narrative:       Aerobic and anaerobic    Blood culture x two cultures. Draw prior to antibiotics. [[id number]] Collected:  04/05/19 1429    Order Status:  Completed Specimen:  Blood from Line, Subclavian, Right Updated:  04/10/19 1612     Blood Culture, Routine No growth after 5 days.    Narrative:       Aerobic and anaerobic     Fungus culture [[id number]] Collected:  04/09/19 1401    Order Status:  Sent Specimen:  Sinus from Nares, Right Updated:  04/09/19 1417    Respiratory Viral Panel by PCR [facility]; Nasal Swab [[id number]] Collected:  04/05/19 1429    Order Status:  Completed Specimen:  Respiratory Updated:  04/09/19 1204     Respiratory Virus Panel, source Nasal Swab     RVP - Adenovirus Not Detected     Comment: Detects Serotypes B and E. Detection of Serotype C may   be limited. If Adenovirus infection is suspected and a   Not Detected result is returned the sample should be   re-tested for Adenovirus using an independent method  (e.g. [facility] Adenovirus Quantitative Real-Time  PCR test.          Enterovirus Not Detected     Comment: Cross-reactivity has been observed between certain Rhinovirus  strains and the Enterovirus assay.          Human Bocavirus Not Detected     Human Coronavirus Not Detected     Comment: The Human Coronavirus assay detects Human coronavirus types  229E, OC43,NL63 and HKU1.          RVP - Human Metapneumovirus (hMPV) Not Detected     RVP - Influenza A Not Detected     Influenza A - Z5Q3-21 Not Detected     RVP - Influenza B Not Detected     Parainfluenza Not Detected     Respiratory Syncytial VirusVirus (RSV) A Not Detected     Comment: The Respiratory Syncytial Viral assay detects types A and B,  however it does not distinguish between the two.          RVP - Rhinovirus Not Detected     Comment: Cross-Reactivity has been observed between certain   Rhinovirus strains and the Enterovirus assay.  Target Enriched Mulitplex Polymerase Chain Reaction (TEM-PCR)  allows for the detection of multiple pathogens out of a single  reaction.  This test was developed and its performance   characteristics determined by [facility].  It has not   been cleared or approved by the U.S.Food and Drug Administration.  Results should be used in conjunction with clinical findings,   and should not form the sole basis  for a diagnosis or treatment  decision.  TEM-PCR is a licensed technology of [employer].         Narrative:       Receiving Lab:->[facility]    Culture, Respiratory with Gram Stain [[id number]]  (Susceptibility) Collected:  04/05/19 1429    Order Status:  Completed Specimen:  Respiratory from Sputum Updated:  04/08/19 0943     Respiratory Culture --     METHICILLIN RESISTANT STAPHYLOCOCCUS AUREUS  Many       Respiratory Culture --     PSEUDOMONAS AERUGINOSA  Moderate       Gram Stain (Respiratory) <10 epithelial cells per low power field.     Gram Stain (Respiratory) Many WBC's     Gram Stain (Respiratory) Many Gram positive cocci     Gram Stain (Respiratory) Rare Gram positive rods     Gram Stain (Respiratory) Rare yeast          I have reviewed all pertinent labs within the past 24 hours.    Diagnostic Results:  Labs: Reviewed  X-Ray: Reviewed

## 2019-04-15 NOTE — ANESTHESIA POSTPROCEDURE EVALUATION
Anesthesia Post Evaluation    Patient: Juanita Ibarra    Procedure(s) Performed: Procedure(s) (LRB):  FESS, USING COMPUTER-ASSISTED NAVIGATION (N/A)  MAXILLARY ANTROSTOMY  ETHMOIDECTOMY (Bilateral)  SPHENOIDECTOMY (Bilateral)    Final Anesthesia Type: general  Patient location during evaluation: PACU  Patient participation: Yes- Able to Participate  Level of consciousness: awake and alert and oriented  Post-procedure vital signs: reviewed and stable  Pain management: adequate  Airway patency: patent  PONV status at discharge: No PONV  Anesthetic complications: no      Cardiovascular status: blood pressure returned to baseline  Respiratory status: unassisted, spontaneous ventilation and room air  Hydration status: euvolemic  Follow-up not needed.          Vitals Value Taken Time   /83 4/14/2019 11:02 PM   Temp 36.9 °C (98.4 °F) 4/14/2019 11:02 PM   Pulse 86 4/14/2019 11:02 PM   Resp 18 4/14/2019 11:02 PM   SpO2 93 % 4/14/2019 11:02 PM         Event Time     Out of Recovery 04/09/2019 16:36:00          Pain/Richard Score: Pain Rating Prior to Med Admin: 8 (4/15/2019  5:31 AM)  Pain Rating Post Med Admin: 3 (4/15/2019  6:31 AM)

## 2019-04-16 NOTE — PT/OT/SLP DISCHARGE
Physical Therapy Discharge Summary    Name: Juanita Ibarra  MRN: [id number]   Principal Problem: Staphylococcus aureus pneumonia     Patient Discharged from acute Physical Therapy on 4/15/19.  Please refer to prior PT noted date on 19 for functional status.     Assessment:     Patient appropriate for care in another setting.    Objective:     GOALS:   Multidisciplinary Problems     Physical Therapy Goals     Not on file          Multidisciplinary Problems (Resolved)        Problem: Physical Therapy Goal    Goal Priority Disciplines Outcome Goal Variances Interventions   Physical Therapy Goal   (Resolved)     PT, PT/OT Outcome(s) achieved     Description:  Goals to be met by: 19     Patient will increase functional independence with mobility by performin. Lower extremity exercise program x 20 reps per handout, with assistance as needed.  2. Pt to perf 10x sit<>stand: 25 sec, to demonstrate improvements in B LE mm strength.  3. Pt to perf 6MWT: amb 800', to demonstrate a clinically significant improvement in aerobic capacity.                          Reasons for Discontinuation of Therapy Services  Transfer to alternate level of care.      Plan:     Patient Discharged to: Pulmonary Rehab.    Alicia Jones, PT  2019

## 2019-04-18 NOTE — TELEPHONE ENCOUNTER
Returned call to Shawn.  Informed him that we will get it printed and signed and have it ready for  in clinic.  Shawn states that he will be his way shortly to  the prescription.

## 2019-04-18 NOTE — TELEPHONE ENCOUNTER
----- Message from Saud Segura sent at 4/18/2019 12:06 PM CDT -----  Contact: Shawn pt's caregiver   Needs Advice    Reason for call: Pharmacy told pt that they need a hard copy in order for pt to get    Fiorocet medication         Communication Preference: [phone]    Additional Information: N/A

## 2019-04-25 NOTE — NURSING
Pt records reviewed.   Pt will be referred to Hepatology.  Cystic fibrosis with pulmonary manifestations  Lung replaced by transplant  Elevated liver function tests  Initial referral received  from the [facility]que.   Referring Provider/diagnosis  PADDY FRANKS      Referral letter sent to patient.

## 2019-04-30 NOTE — TELEPHONE ENCOUNTER
----- Message from Neelima Villagomez sent at 4/30/2019  9:23 AM CDT -----  Contact: Shawn Castrejon pt's caregiver  Dianne Cali    Please call patient caretaker need appointment sooner than June.    Thanks,  Neelima  ----- Message -----  From: Saud Segura  Sent: 4/29/2019   1:54 PM  To: Hepatology Scheduling    Needs Advice    Reason for call: Calling to schedule an appt for the pt        Communication Preference: [phone]     Additional Information: N/A

## 2019-05-01 NOTE — PROGRESS NOTES
LUNG TRANSPLANT RECIPIENT FOLLOW-UP    Reason for Visit: Follow-up after lung transplantation.      Date of Transplant: 6/12/14     Reason for Transplant: Cystic fibrosis      Type of Transplant: bilateral sequential lung     CMV Status: D+ / R-      Major Complications:   1. Vocal cord dysfunction- resolved   2. A1 Rejection 8/2014   3. C glabrata positive sputum-recurrent  4. Pseudomonas pneumonia in 02/2015 resolved   5. 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 CARLOS EDUARDO (grade 3)  7. Chronic respiratory failure                                                                               History of Present Illness: Juanita Ibarra is a 29 y.o. year old female with the above listed transplant history presents today for routine follow-up.  She complains of vague throat pain but denies any fever, chills, sick contacts.  She does have occasional sinus drainage and uses rinses as directed by ENT.  She underwent FESS during her most recent hospitalization.  She denies any cough and is stable from a respiratory standpoint.  She recently completed IV vanc/cefepime for PNA.  She has undergone psychiatry and pain management evaluation as part of her re-transplant work-up.  She is scheduled for her colonoscopy on Friday.  Otherwise, she has been feeling depressed over the recent death of her friend but states she has been coping well.    Review of Systems   Constitutional: Positive for malaise/fatigue. Negative for chills, diaphoresis, fever and weight loss.   HENT: Negative for congestion, ear discharge, ear pain, hearing loss, nosebleeds and sore throat.    Eyes: Negative for blurred vision, double vision, photophobia and pain.   Respiratory: Positive for cough and shortness of breath. Negative for hemoptysis, sputum production, wheezing and stridor.    Cardiovascular: Negative for chest pain, palpitations, orthopnea, claudication, leg swelling and PND.   Gastrointestinal: Negative for abdominal pain, blood in stool, constipation,  "diarrhea, heartburn, melena, nausea and vomiting.   Genitourinary: Negative for dysuria, flank pain, frequency, hematuria and urgency.   Musculoskeletal: Negative for back pain, falls, joint pain, myalgias and neck pain.   Skin: Negative for itching and rash.   Neurological: Negative for dizziness, tingling, tremors, sensory change, focal weakness, seizures, loss of consciousness, weakness and headaches.   Endo/Heme/Allergies: Does not bruise/bleed easily.   Psychiatric/Behavioral: Positive for depression. Negative for hallucinations, memory loss and suicidal ideas. The patient is not nervous/anxious and does not have insomnia.      /87   Pulse 87   Temp 97.2 °F (36.2 °C) (Oral)   Resp 20   Ht 5' 1" (1.549 m)   Wt 60.8 kg (134 lb)   LMP 10/02/2016   SpO2 98% Comment: 3 liters  BMI 25.32 kg/m²     Physical Exam   Constitutional: She is oriented to person, place, and time and well-developed, well-nourished, and in no distress. No distress.   HENT:   Head: Normocephalic and atraumatic.   Nose: Nose normal.   Mouth/Throat: Oropharynx is clear and moist. No oropharyngeal exudate.   Eyes: Pupils are equal, round, and reactive to light. Conjunctivae and EOM are normal. No scleral icterus.   Neck: Normal range of motion. Neck supple. No JVD present. No tracheal deviation present. No thyromegaly present.   Cardiovascular: Normal rate, regular rhythm and intact distal pulses. Exam reveals no gallop and no friction rub.   No murmur heard.  Pulmonary/Chest: Effort normal and breath sounds normal. No stridor. No respiratory distress. She has no wheezes. She has no rales. She exhibits no tenderness.   Abdominal: Soft. Bowel sounds are normal. She exhibits no distension and no mass. There is no tenderness. There is no rebound and no guarding.   Musculoskeletal: Normal range of motion. She exhibits no edema.   Lymphadenopathy:     She has no cervical adenopathy.   Neurological: She is alert and oriented to person, " place, and time. No cranial nerve deficit. Gait normal. Coordination normal. GCS score is 15.   Skin: Skin is warm and dry. No rash noted. She is not diaphoretic. No erythema. No pallor.   Psychiatric: Mood and affect normal.     Labs:  cbc, cmp, tacrolimus Latest Ref Rng & Units 4/15/2019 4/23/2019 5/1/2019   TACROLIMUS LVL 5.0 - 15.0 ng/mL 3.5(L) - -   WHITE BLOOD CELL COUNT 3.90 - 12.70 K/uL 7.45 8.22 8.55   RBC 4.00 - 5.40 M/uL 3.06(L) 3.82(L) 3.84(L)   HEMOGLOBIN 12.0 - 16.0 g/dL 7.8(L) 9.6(L) 9.3(L)   HEMATOCRIT 37.0 - 48.5 % 27.6(L) 32.5(L) 32.1(L)   MCV 82 - 98 fL 90 85 84   MCH 27.0 - 31.0 pg 25.5(L) 25.1(L) 24.2(L)   MCHC 32.0 - 36.0 g/dL 28.3(L) 29.5(L) 29.0(L)   RDW 11.5 - 14.5 % 15.4(H) 14.3 14.9(H)   PLATELETS 150 - 350 K/uL 172 195 168   MPV 9.2 - 12.9 fL 11.1 11.2 10.6   GRAN # 1.8 - 7.7 K/uL 3.7 5.5 4.1   LYMPH # 1.0 - 4.8 K/uL 2.8 1.9 3.5   MONO # 0.3 - 1.0 K/uL 0.6 0.5 0.6   EOSINOPHIL% 0.0 - 8.0 % 3.8 3.9 3.6   BASOPHIL% 0.0 - 1.9 % 0.5 0.4 0.5   DIFFERENTIAL METHOD - Automated Automated Automated   SODIUM 136 - 145 mmol/L 137 139 -   POTASSIUM 3.5 - 5.1 mmol/L 4.3 4.2 -   CHLORIDE 95 - 110 mmol/L 102 104 -   CO2 23 - 29 mmol/L 30(H) 25 -   GLUCOSE 70 - 110 mg/dL 96 135(H) -   BUN BLD 6 - 20 mg/dL 15 18 -   CREATININE 0.5 - 1.4 mg/dL 0.9 1.0 -   CALCIUM 8.7 - 10.5 mg/dL 8.4(L) 8.9 -   PROTEIN TOTAL 6.0 - 8.4 g/dL 6.6 7.3 -   ALBUMIN 3.5 - 5.2 g/dL 3.0(L) 3.2(L) -   BILIRUBIN TOTAL 0.1 - 1.0 mg/dL 0.2 0.1 -   ALK PHOS 55 - 135 U/L 256(H) 199(H) -   AST 10 - 40 U/L 91(H) 63(H) -   ALT 10 - 44 U/L 97(H) 56(H) -   ANION GAP 8 - 16 mmol/L 5(L) 10 -   EGFR IF AFRICAN AMERICAN >60 mL/min/1.73 m:2 >60.0 >60.0 -   EGFR IF NON-AFRICAN AMERICAN >60 mL/min/1.73 m:2 >60.0 >60.0 -     Pulmonary Function Tests 5/1/2019 1/9/2019 11/26/2018 10/22/2018 6/18/2018 4/23/2018 1/15/2018   FVC 1.45 1.79 1.85 2.23 2.52 2.21 2.2   FEV1 0.61 0.71 0.78 0.9 0.93 0.88 0.9   TLC (liters) - - - - - - -   DLCO (ml/mmHg sec) - -  - - - - -   FVC% 42.5 56 54.2 65.3 78 69 68   FEV1% 20.9 25 26.6 30.9 33 31 32   FEF 25-75 0.24 0.28 0.33 0.35 0.35 0.36 0.39   FEF 25-75% 7.4 8 9.8 10.5 11 11 12   TLC% - - - - - - -   RV - - - - - - -   RV% - - - - - - -   DLCO% - - - - - - -       Imaging:  Results for orders placed during the hospital encounter of 05/01/19   X-Ray Chest PA And Lateral    Narrative EXAMINATION:  XR CHEST PA AND LATERAL    CLINICAL HISTORY:  s/p bilateral lung transplant 6/12/2014; Lung transplant status    TECHNIQUE:  PA and lateral views of the chest were performed.    COMPARISON:  04/05/2019    FINDINGS:  There is a right chest single-lumen Port-A-Cath via subclavian approach with tip projected over SVC.  Trachea is patent.  Cardiac silhouette is normal in size.  Lungs are well expanded and appear clear.  No effusion, pneumothorax or free air below the diaphragm.  There are sternotomy wires.  There is no acute osseous abnormality.  Clips are seen in the right upper quadrant.      Impression No acute cardiopulmonary process.  No interval worsening.      Electronically signed by: Brian Zendejas MD  Date:    05/01/2019  Time:    09:38       Assessment:  1. Encounter following organ transplant    2. Lung replaced by transplant    3. Immunosuppression    4. Prophylactic antibiotic    5. Bronchiolitis obliterans syndrome    6. Chronic maxillary sinusitis    7. Gastroesophageal reflux disease without esophagitis    8. Migraine without status migrainosus, not intractable, unspecified migraine type      Plan:   1. FEV1 decreased.  CXR stable without acute changes.  Has known CARLOS EDUARDO.  Recently was hospitalized for PNA and has completed treatment with IV Vanc/Cefepime.      2. Continue with Tacrolimus 2.5mg BID, MMF 500mg BID, and Prednisone.  Will follow-up Tac level and adjust if needed.    3. Continue with Dapsone.    4. Has known CARLOS EDUARDO with hypoxemia.  Continue with supplemental oxygen.  Encouraged pulmonary rehab.  Currently being  evaluated for re-transplant.  Colonoscopy scheduled for Friday to complete work-up.      5. Continue with sinus regimen as directed by ENT.  Underwent FESS during most recent hospitalization.    6. Refilled Omeprazole 40mg BID.    7. Gave one time refill of Topamax until she can establish with a PCP.    8. Follow-up in 2 months.      Francisca Morin D.O.  Lung Transplant  Pulmonary/Critical Care Medicine    ADDENDUM:    Juanita Ibarra is on 2L of oxygen.  She is on no assisted ventilation.  Her New York Heart Association Class is 50% - Requires considerable assistance and frequent medical care.  She is not diabetic.

## 2019-05-01 NOTE — TELEPHONE ENCOUNTER
Returned call to Gena from [facility].  Gena asked to discharge patient from antibiotic treatment since pt has completed therapy.  Informed Gena to discontinue order as patient has completed her antibiotics but will require monthly port care.  Pete verbalized understanding and will continue with month port maintenance.

## 2019-05-01 NOTE — TELEPHONE ENCOUNTER
----- Message from Apolonia Machuca sent at 5/1/2019 11:32 AM CDT -----  Contact: Gena Orozco [phone]/Fax [phone]  Calling to get discharge orders for antibiotics    Port has already been deaccessed    pls contact Rita

## 2019-05-03 PROBLEM — Z01.818 PREOPERATIVE TESTING: Status: ACTIVE | Noted: 2019-01-01

## 2019-05-03 NOTE — H&P
History & Physical - Short Stay  Gastroenterology      SUBJECTIVE:     Procedure: Colonoscopy    Chief Complaint/Indication for Procedure: Screening    History of Present Illness:  Patient is a 29 y.o. female presents preoperative evaluation for lung retransplantation.     PTA Medications   Medication Sig    amLODIPine (NORVASC) 10 MG tablet Take 1 tablet (10 mg total) by mouth once daily.    butalbital-acetaminophen-caffeine -40 mg (FIORICET, ESGIC) -40 mg per tablet Take 1 tablet by mouth every 6 (six) hours as needed for Pain.    calcium-vitamin D3 (OS-KATY 500 + D3) 500 mg(1,250mg) -200 unit per tablet Take 1 tablet by mouth 2 (two) times daily with meals.    dapsone 100 MG Tab Take 1 tablet (100 mg total) by mouth once daily.    diphenhydrAMINE (BENADRYL) 50 MG tablet Take 1 tablet (50 mg total) by mouth every 6 (six) hours as needed for Itching.    DULERA 100-5 mcg/actuation HFAA INHALE 1 PUFF BY MOUTH TWICE DAILY    fexofenadine (ALLEGRA) 180 MG tablet Take 180 mg by mouth once daily.    fluconazole (DIFLUCAN) 150 MG Tab TAKE 1 TABLET BY MOUTH EVERY WEEK    fluticasone (FLONASE) 50 mcg/actuation nasal spray INSERT 2 SPRAYS IN EACH NOSTRIL DAILY    folic acid (FOLVITE) 1 MG tablet Take 1 tablet (1,000 mcg total) by mouth once daily.    gabapentin (NEURONTIN) 300 MG capsule Take 1 capsule (300 mg total) by mouth 3 (three) times daily.    levalbuterol (XOPENEX HFA) 45 mcg/actuation inhaler Inhale 1-2 puffs into the lungs.    lipase-protease-amylase (CREON) 36,000-114,000- 180,000 unit CpDR Take 4 capsules by mouth 3 (three) times daily with meals. Take 3 with snacks prn. (Patient taking differently: Take 5 capsules by mouth 3 (three) times daily with meals. Take 3 with snacks prn.)    LORazepam (ATIVAN) 2 MG Tab Take 1 tablet by mouth every 8 hours as needed for ANXIETY    magnesium oxide (MAG-OX) 400 mg (241.3 mg magnesium) tablet Take 1 tablet (400 mg total) by mouth 2 (two) times  daily.    metoprolol tartrate (LOPRESSOR) 25 MG tablet Take 1 tablet (25 mg total) by mouth 2 (two) times daily.    montelukast (SINGULAIR) 10 mg tablet Take 1 tablet (10 mg total) by mouth once daily.    morphine (MS CONTIN) 15 MG 12 hr tablet TAKE ONE TABLET BY MOUTH TWICE DAILY.    mycophenolate (CELLCEPT) 250 mg Cap Take 2 capsules (500 mg total) by mouth 2 (two) times daily.    omeprazole (PRILOSEC) 40 MG capsule Take 1 capsule (40 mg total) by mouth 2 (two) times daily before meals.    oxycodone (ROXICODONE) 5 MG immediate release tablet Take 10 mg by mouth every 4 (four) hours as needed for Pain.    predniSONE (DELTASONE) 5 MG tablet Take 1 tablet (5 mg total) by mouth once daily.    promethazine (PHENERGAN) 25 MG tablet Take 1 tablet (25 mg total) by mouth every 6 (six) hours as needed for Nausea.    QUEtiapine (SEROQUEL) 25 MG Tab Take 1 tablet by mouth in morning and 2 tablets at bedtime    tacrolimus (PROGRAF) 0.5 MG Cap Take 1 capsule (0.5 mg total) by mouth every 12 (twelve) hours.    tacrolimus (PROGRAF) 1 MG Cap Take 2 capsules (2 mg total) by mouth every 12 (twelve) hours. Daily doses: 2.5 mg twice daily.    tiZANidine (ZANAFLEX) 4 MG tablet TAKE 2 TABLETS BY MOUTH EVERY 6 HOURS AS NEEDED    venlafaxine (EFFEXOR-XR) 150 MG Cp24 Take 1 capsule (150 mg total) by mouth once daily.    venlafaxine (EFFEXOR-XR) 37.5 MG 24 hr capsule Take 1 capsule (37.5 mg total) by mouth once daily.    inhalation spacing device (PROCHAMBER) USE AS DIRECTED    levalbuterol (XOPENEX) 1.25 mg/3 mL nebulizer solution Take 3 mLs (1.25 mg total) by nebulization every 8 (eight) hours as needed for Wheezing or Shortness of Breath. Rescue    multivit,min52-folic-vitK-cQ10 (AQUADEKS) 100-700-10 mcg-mcg-mg Cap cap 1 tab twice daily    multivitamin-minerals no.55 (CENTRUM FLAVOR BURST ADULT) Chew     polyethylene glycol (GLYCOLAX) 17 gram PwPk Take 17 g by mouth 2 (two) times daily.    [] polyethylene  glycol (GOLYTELY,NULYTELY) 236-22.74-6.74 -5.86 gram suspension Take 4,000 mLs (4 L total) by mouth once. for 1 dose       Review of patient's allergies indicates:   Allergen Reactions    Albuterol Palpitations    Colistin Anaphylaxis    Vancomycin analogues      Infusion reaction that does not resolve with slowing    Neupogen [filgrastim] Other (See Comments)     Ostealgia after five daily doses of 300 mcg.      Bactrim [sulfamethoxazole-trimethoprim] Hives    Ceftazidime Hives     Pt stated can tolerate cefapine not ceftazidime    Ceftazidime     Dronabinol Other (See Comments)     Mental changes/hallucinations    Haldol [haloperidol lactate] Other (See Comments)     Seizure like activity    Nsaids (non-steroidal anti-inflammatory drug)      Cannot have due to lung transplant    Adhesive Rash     Cloth tape- please use tegaderm or paper tape    Aztreonam Rash    Ciprofloxacin Nausea And Vomiting     Projectile N/V, per patient.  Unwilling to retry therapy.        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    Cystic fibrosis of the lung     Ear infection     Hypertension     Migraine headache     MRSA (methicillin resistant Staphylococcus aureus) carrier     Osteopenia     Other specified disease of pancreas     Pain disorder     Seizures 2009    Sinusitis, chronic     Vocal cord paralysis 06/2014    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     Past Surgical History:   Procedure Laterality Date    ABDOMINAL SURGERY      peg tube     IOFYUXCL-LXQTHTTLOIY-SLDJNMFSUGPC N/A 5/19/2015    Performed by [facility] OR    BIOPSY-BRONCHUS N/A 12/20/2016    Performed by [facility] at [facility] OR 2ND [facility]    BRONCHOSCOPY Bilateral 12/11/2018    Performed by Francisca Morin DO at [facility] OR 2ND [facility]    BRONCHOSCOPY N/A 10/1/2018    Performed by [facility]varez MD at [facility] OR 2ND [facility]    BRONCHOSCOPY Bilateral  12/20/2016    Performed by [facility]ND [facility]    BRONCHOSCOPY - flexible bronchoscopy with probable tissue biopsy CPT 21489 N/A 7/21/2015    Performed by [facility]ND [facility]    BRONCHOSCOPY - flexible bronchoscopy with probable tissue biospy CPT 07433 N/A 10/20/2015    Performed by [facility] at [facility]ND [facility]    BRONCHOSCOPY - flexible bronchoscopy with tissue biopsy CPT 90018 N/A 9/29/2015    Performed by [facility] at [facility]ND [facility]    BRONCHOSCOPY - flexible bronchoscopy with tissue biopsy CPT 64946 N/A 5/26/2015    Performed by Jake [facility] at [facility] 1ST [facility]    BRONCHOSCOPY flexible bronchoscopy with tissue biopsy N/A 9/8/2014    Performed by Jake [facility] at [facility] 2ND [facility]    BRONCHOSCOPY flexible with possible tissue biopsy N/A 8/8/2014    Performed by Jake [facility]varez [facility] at [facility] 2ND [facility]    BRONCHOSCOPY, BAL, BIOPSIES N/A 3/20/2018    Performed by Jake [facility] at [facility]ND [facility]    BRONCHOSCOPY-FIBEROPTIC N/A 1/29/2016    Performed by Joann Cifuentes DO at [facility] 2ND [facility]    BRONCHOSCOPY; Bronchoscopy with BAL and transbronchial biopsies under general anesthesia N/A 5/29/2018    Performed by Jake [facility]varez [facility] at [facility] 2ND [facility]    CHOLECYSTECTOMY  2016    CHOLECYSTECTOMY-LAPAROSCOPIC N/A 7/22/2016    Performed by Joshua Goldberg, [facility] at [facility] 2ND [facility]    ENDOMETRIAL ABLATION  2015    K[facility]    ETHMOIDECTOMY Bilateral 4/9/2019    Performed by Adin Burks III, MD at [facility] 2ND [facility]    FESS      FESS Bilateral 10/21/2013    Performed by Jared Whatlye MD at [facility] 2ND [facility]    FESS, USING COMPUTER-ASSISTED NAVIGATION N/A 4/9/2019    Performed by Adin Burks III, [facility] at [facility] 2ND [facility]    FINE NEEDLE ASPIRATION (FNA)  of a cervical lymphadenopathy  1/29/2016    Performed by Joann Cifuentes DO at [facility] 2ND [facility]    flex bronchoscopy with probable tissue biopsy N/A 7/31/2014     Performed by [facility] at [facility] OR [address] [place] [address]    flexible bronchoscopy with tissue biopsy N/A 12/11/2014    Performed by Jake Alvarez MD at [facility] OR [address] [place] [address]    HYSTERECTOMY  04/2017    [facility]    INJECTION-VOCAL CORD Left 6/24/2014    Performed by Jared Whatley MD at [facility] OR [address] [place] [address]    HUBVULDCR-HUQE-S-CATH to right chest and removal of portacath to left chests wall. Bilateral 6/24/2014    Performed by Zhen Dorado MD at [facility] OR [address] [place] [address]    LARYNX SURGERY  2016    LUNG TRANSPLANT Bilateral 6/12/14    MAXILLARY ANTROSTOMY  4/9/2019    Performed by Adin Burks III, MD at [facility] OR [address] [place] [address]    MYRINGOTOMY W/ TUBES Right 04/2017    MYRINGOTOMY WITH INSERTION OF PE TUBES Right 4/15/2017    Performed by Gary Null MD at [facility] OR [address] [place] [address]    PLACEMENT-TUBE-PEG  5/15/2014    Performed by David Moses MD at [facility] OR [address] [place] [address]    PORTACATH PLACEMENT Right     rt sc    REMOVAL-TUBE-PEG  5/15/2014    Performed by David Moses MD at [facility] OR [address] [place] [address]    RHC for lung re-transplant N/A 1/22/2019    Performed by Laura Rachel MD at [facility] CATH LAB    ROBOTIC ASSISTED LAPAROSCOPIC HYSTERECTOMY N/A 4/25/2017    Performed by [facility]y Dias Jr[facility] MD at [facility] OR    SALPINGECTOMY Bilateral 2015    [facility]    SALPINGECTOMY-LAPAROSCOPIC Bilateral 5/19/2015    Performed by [facility]y Dias Jr., MD at [facility] OR    SINUS SURGERY FUNCTIONAL ENDOSCOPIC WITH NAVIGATION Bilateral 4/3/2017    Performed by Cesar Olsen MD at [facility] OR [address] [place] [address]    SINUS SURGERY FUNCTIONAL ENDOSCOPIC WITH NAVIGATION, 84161, 01351, 44173, 60337 Bilateral 2/5/2015    Performed by Cesar Olsen MD at [facility] OR [address] [place] [address]    SPHENOIDECTOMY Bilateral 4/9/2019    Performed by Adin Burks III, MD at [facility] OR [address] [place] [address]    THYROPLASTY - Medialization laryngoplasty, cricothyroid subluxation, arytenoid repositioning Left 8/2/2016    Performed by Gary Null MD at [facility] OR [address] [place] [address]    TRANSPLANT-LUNG Bilateral 6/11/2014     Performed by Adolfo Huston MD at [facility] OR [address] [place] [address]     Family History   Problem Relation Age of Onset    Thrombocytopenia Maternal Grandfather     Drug abuse Maternal Grandfather     Diabetes Maternal Grandfather     Hypertension Maternal Grandmother     Breast cancer Neg Hx     Colon cancer Neg Hx     Ovarian cancer Neg Hx      Social History     Tobacco Use    Smoking status: Never Smoker    Smokeless tobacco: Never Used   Substance Use Topics    Alcohol use: No     Comment: Has not had an alcoholic drink in more than 2 months.    Drug use: No       Review of Systems:  Respiratory: no cough or shortness of breath  Cardiovascular: no chest pain or palpitations  Gastrointestinal: negative for abdominal pain and diarrhea    OBJECTIVE:     Vital Signs (Most Recent)       Physical Exam:  General: well developed, no distress  Lungs:  normal respiratory effort  Heart: regular rate, S1, S2 normal    Laboratory  CBC:   Recent Labs   Lab 05/01/19  0940   WBC 8.55   RBC 3.84*   HGB 9.3*   HCT 32.1*      MCV 84   MCH 24.2*   MCHC 29.0*     CMP:   Recent Labs   Lab 05/01/19  0940   *   CALCIUM 9.2   ALBUMIN 3.3*   PROT 7.4      K 4.1   CO2 24      BUN 24*   CREATININE 0.9   ALKPHOS 281*   *   AST 21   BILITOT 0.2     Coagulation: No results for input(s): LABPROT, INR, APTT in the last 168 hours.      Diagnostic Results:      ASSESSMENT/PLAN:     Preoperative evaluation    Plan: Colonoscopy    Anesthesia Plan: MAC    ASA Grade: ASA 3 - Patient with moderate systemic disease with functional limitations     The impression and plan was discussed in detail with the patient and family. All questions have been answered and the patient voices understanding of our plan at this point. The risk of the procedure was discussed in detail which includes but not limited to bleeding, infection, perforation in some cases requiring surgery with its spectrum of complications.

## 2019-05-03 NOTE — [employer] PATIENT INSTRUCTIONS
Discharge Summary/Instructions after an Endoscopic Procedure  Patient Name: Juanita Ibarra  Patient MRN: [id number]  Patient YOB: 1990  Friday, May 03, 2019  Juancho Rich MD  RESTRICTIONS:  During your procedure today, you received medications for sedation.  These   medications may affect your judgment, balance and coordination.  Therefore,   for 24 hours, you have the following restrictions:   - DO NOT drive a car, operate machinery, make legal/financial decisions,   sign important papers or drink alcohol.    ACTIVITY:  Today: no heavy lifting, straining or running due to procedural   sedation/anesthesia.  The following day: return to full activity including work.  DIET:  Eat and drink normally unless instructed otherwise.     TREATMENT FOR COMMON SIDE EFFECTS:  - Mild abdominal pain, nausea, belching, bloating or excessive gas:  rest,   eat lightly and use a heating pad.  - Sore Throat: treat with throat lozenges and/or gargle with warm salt   water.  - Because air was used during the procedure, expelling large amounts of air   from your rectum or belching is normal.  - If a bowel prep was taken, you may not have a bowel movement for 1-3 days.    This is normal.  SYMPTOMS TO WATCH FOR AND REPORT TO YOUR PHYSICIAN:  1. Abdominal pain or bloating, other than gas cramps.  2. Chest pain.  3. Back pain.  4. Signs of infection such as: chills or fever occurring within 24 hours   after the procedure.  5. Rectal bleeding, which would show as bright red, maroon, or black stools.   (A tablespoon of blood from the rectum is not serious, especially if   hemorrhoids are present.)  6. Vomiting.  7. Weakness or dizziness.  GO DIRECTLY TO THE NEAREST EMERGENCY ROOM IF YOU HAVE ANY OF THE FOLLOWING:      Difficulty breathing              Chills and/or fever over 101 F   Persistent vomiting and/or vomiting blood   Severe abdominal pain   Severe chest pain   Black, tarry stools   Bleeding- more than one  tablespoon   Any other symptom or condition that you feel may need urgent attention  Your doctor recommends these additional instructions:  If any biopsies were taken, your doctors clinic will contact you in 1 to 2   weeks with any results.  - Discharge patient to home (ambulatory).   - Return to referring physician.   - Repeat colonoscopy in 5 years for screening purposes.  For questions, problems or results please call your physician - Juancho Rich MD at Work:  [phone].  [facility], EMERGENCY ROOM PHONE NUMBER: [phone]  IF A COMPLICATION OR EMERGENCY SITUATION ARISES AND YOU ARE UNABLE TO REACH   YOUR PHYSICIAN - GO DIRECTLY TO THE EMERGENCY ROOM.  Juancho Rich MD  5/3/2019 12:14:36 PM  This report has been verified and signed electronically.  [employer]

## 2019-05-03 NOTE — ANESTHESIA PREPROCEDURE EVALUATION
05/03/2019  Pre-operative evaluation for Procedure(s) (LRB):  COLONOSCOPY (N/A)    Juanita Ibarra is a 29 y.o. female cystic fibrosis (prednisone 5 mg daily, Home O2 prn up to 3lpm, recently re-listed for transplant), vocal chord paralysis with implant in place (requires 5.0 ETT per ENT), DMII, PONV.     Patient Active Problem List   Diagnosis    Cystic fibrosis with pulmonary manifestations    Sinusitis, chronic    Anxiety disorder    Other pain disorders related to psychological factors    Awaiting transplantation of lung    Protein calorie malnutrition    CF related Pancreatic insufficiency    Chronic pain with opiate use    Allergy to multiple antibiotics    Dysphagia, oropharyngeal    Chronic visceral pain (pleura)    Transaminitis    Gallstones    Hyperglycemia    Adverse effect of glucocorticoid or synthetic analogue    Immunosuppression    Complication of lung transplant    Dysphonia    Constipation    Portacath in place    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    Prophylactic antibiotic    Complication of transplanted lung    Acute rejection of lung transplant    Debility    Muscle spasm of back    Anemia    Headache    Aftercare following organ transplant    Leukopenia    LRTI (lower respiratory tract infection)    SBO (small bowel obstruction)    Sterilization    Lung replaced by transplant    Fever    Cytomegalovirus (CMV) viremia    Hospital acquired PNA    Hyperkalemia, diminished renal excretion    Acute kidney injury    Other complications of lung transplant    S/P lung transplant    Pneumonia, organism unspecified(486)    Lymphadenopathy    Biliary colic    Unilateral complete vocal fold paralysis    Bronchiolitis obliterans syndrome    Chronic ethmoidal sinusitis    Mastoiditis    ETD (Eustachian tube dysfunction), bilateral     Dysmenorrhea    Sinus infection    Hypertension    Pneumonia    Current chronic use of systemic steroids    TMJ arthralgia    Adverse effect of anabolic steroid    Steroid-induced hyperglycemia    Opioid overdose    Acute kidney failure with lesion of tubular necrosis    SOB (shortness of breath)    Thrombocytopenia    Splenomegaly    Staphylococcus aureus pneumonia    Acute otitis externa of left ear    Lung transplant status    Preoperative testing       Review of patient's allergies indicates:   Allergen Reactions    Albuterol Palpitations    Colistin Anaphylaxis    Vancomycin analogues      Infusion reaction that does not resolve with slowing    Neupogen [filgrastim] Other (See Comments)     Ostealgia after five daily doses of 300 mcg.      Bactrim [sulfamethoxazole-trimethoprim] Hives    Ceftazidime Hives     Pt stated can tolerate cefapine not ceftazidime    Ceftazidime     Dronabinol Other (See Comments)     Mental changes/hallucinations    Haldol [haloperidol lactate] Other (See Comments)     Seizure like activity    Nsaids (non-steroidal anti-inflammatory drug)      Cannot have due to lung transplant    Adhesive Rash     Cloth tape- please use tegaderm or paper tape    Aztreonam Rash    Ciprofloxacin Nausea And Vomiting     Projectile N/V, per patient.  Unwilling to retry therapy.       No current facility-administered medications on file prior to encounter.      Current Outpatient Medications on File Prior to Encounter   Medication Sig Dispense Refill    amLODIPine (NORVASC) 10 MG tablet Take 1 tablet (10 mg total) by mouth once daily. 30 tablet 11    calcium-vitamin D3 (OS-KATY 500 + D3) 500 mg(1,250mg) -200 unit per tablet Take 1 tablet by mouth 2 (two) times daily with meals. 60 tablet 11    dapsone 100 MG Tab Take 1 tablet (100 mg total) by mouth once daily. 30 tablet 11    diphenhydrAMINE (BENADRYL) 50 MG tablet Take 1 tablet (50 mg total) by mouth every 6 (six)  hours as needed for Itching. 1 tablet 0    DULERA 100-5 mcg/actuation HFAA INHALE 1 PUFF BY MOUTH TWICE DAILY 13 g 0    fexofenadine (ALLEGRA) 180 MG tablet Take 180 mg by mouth once daily.      fluconazole (DIFLUCAN) 150 MG Tab TAKE 1 TABLET BY MOUTH EVERY WEEK 30 tablet 0    fluticasone (FLONASE) 50 mcg/actuation nasal spray INSERT 2 SPRAYS IN EACH NOSTRIL DAILY 16 g 5    folic acid (FOLVITE) 1 MG tablet Take 1 tablet (1,000 mcg total) by mouth once daily. 30 tablet 11    levalbuterol (XOPENEX HFA) 45 mcg/actuation inhaler Inhale 1-2 puffs into the lungs.      lipase-protease-amylase (CREON) 36,000-114,000- 180,000 unit CpDR Take 4 capsules by mouth 3 (three) times daily with meals. Take 3 with snacks prn. (Patient taking differently: Take 5 capsules by mouth 3 (three) times daily with meals. Take 3 with snacks prn.) 800 capsule 11    LORazepam (ATIVAN) 2 MG Tab Take 1 tablet by mouth every 8 hours as needed for ANXIETY 30 tablet 1    magnesium oxide (MAG-OX) 400 mg (241.3 mg magnesium) tablet Take 1 tablet (400 mg total) by mouth 2 (two) times daily. 60 tablet 11    metoprolol tartrate (LOPRESSOR) 25 MG tablet Take 1 tablet (25 mg total) by mouth 2 (two) times daily. 60 tablet 11    montelukast (SINGULAIR) 10 mg tablet Take 1 tablet (10 mg total) by mouth once daily. 30 tablet 11    mycophenolate (CELLCEPT) 250 mg Cap Take 2 capsules (500 mg total) by mouth 2 (two) times daily. 120 capsule 11    oxycodone (ROXICODONE) 5 MG immediate release tablet Take 10 mg by mouth every 4 (four) hours as needed for Pain.      predniSONE (DELTASONE) 5 MG tablet Take 1 tablet (5 mg total) by mouth once daily. 30 tablet 11    promethazine (PHENERGAN) 25 MG tablet Take 1 tablet (25 mg total) by mouth every 6 (six) hours as needed for Nausea. 60 tablet 2    QUEtiapine (SEROQUEL) 25 MG Tab Take 1 tablet by mouth in morning and 2 tablets at bedtime 90 tablet 1    tacrolimus (PROGRAF) 0.5 MG Cap Take 1 capsule (0.5 mg  total) by mouth every 12 (twelve) hours. 60 capsule 11    tacrolimus (PROGRAF) 1 MG Cap Take 2 capsules (2 mg total) by mouth every 12 (twelve) hours. Daily doses: 2.5 mg twice daily. 90 capsule 11    tiZANidine (ZANAFLEX) 4 MG tablet TAKE 2 TABLETS BY MOUTH EVERY 6 HOURS AS NEEDED 90 tablet 0    venlafaxine (EFFEXOR-XR) 150 MG Cp24 Take 1 capsule (150 mg total) by mouth once daily. 30 capsule 1    venlafaxine (EFFEXOR-XR) 37.5 MG 24 hr capsule Take 1 capsule (37.5 mg total) by mouth once daily. 30 capsule 1    inhalation spacing device (PROCHAMBER) USE AS DIRECTED 1 Device 0    levalbuterol (XOPENEX) 1.25 mg/3 mL nebulizer solution Take 3 mLs (1.25 mg total) by nebulization every 8 (eight) hours as needed for Wheezing or Shortness of Breath. Rescue 72 mL 5    multivit,min52-folic-vitK-cQ10 (AQUADEKS) 100-700-10 mcg-mcg-mg Cap cap 1 tab twice daily 60 capsule 0    multivitamin-minerals no.55 (CENTRUM FLAVOR BURST ADULT) Chew       polyethylene glycol (GLYCOLAX) 17 gram PwPk Take 17 g by mouth 2 (two) times daily. 60 packet 11       Past Surgical History:   Procedure Laterality Date    ABDOMINAL SURGERY      peg tube     BNXOVZIU-BORHTGNSOTP-QHNHJMPTNEUX N/A 5/19/2015    Performed by Deny Dias Jr., [facility] at [facility] OR    BIOPSY-BRONCHUS N/A 12/20/2016    Performed by Jake [facility]varez [facility] at [facility] OR 2ND [facility]LR    BRONCHOSCOPY Bilateral 12/11/2018    Performed by Francisca Morin DO at [facility] OR [facility]    BRONCHOSCOPY N/A 10/1/2018    Performed by Jake Alvarez MD at [facility] OR [facility]    BRONCHOSCOPY Bilateral 12/20/2016    Performed by [facility] at [facility]    BRONCHOSCOPY - flexible bronchoscopy with probable tissue biopsy CPT 68486 N/A 7/21/2015    Performed by Jake Alvarez MD at [facility] OR [facility]    BRONCHOSCOPY - flexible bronchoscopy with probable tissue biospy CPT 22510 N/A 10/20/2015    Performed by Jake Alvarez MD at [facility] OR [facility]    BRONCHOSCOPY - flexible  bronchoscopy with tissue biopsy CPT 49269 N/A 9/29/2015    Performed by [facility] at [facility] OR 2ND [facility]    BRONCHOSCOPY - flexible bronchoscopy with tissue biopsy CPT 95303 N/A 5/26/2015    Performed by Jake Alvarez MD at [facility] OR 1ST [facility]LR    BRONCHOSCOPY flexible bronchoscopy with tissue biopsy N/A 9/8/2014    Performed by Jake Alvarez MD at [facility] OR 2ND [facility]    BRONCHOSCOPY flexible with possible tissue biopsy N/A 8/8/2014    Performed by Jake Alvarez MD at [facility] OR 2ND [facility]    BRONCHOSCOPY, BAL, BIOPSIES N/A 3/20/2018    Performed by Jake Alvarez MD at [facility] OR 2ND [facility]    BRONCHOSCOPY-FIBEROPTIC N/A 1/29/2016    Performed by Joann Cifuentes DO at [facility] OR 2ND [facility]LR    BRONCHOSCOPY; Bronchoscopy with BAL and transbronchial biopsies under general anesthesia N/A 5/29/2018    Performed by Jake Alvarez MD at [facility] OR [address] [place] [address]    CHOLECYSTECTOMY  2016    CHOLECYSTECTOMY-LAPAROSCOPIC N/A 7/22/2016    Performed by Joshua Goldberg, MD at [facility] OR 2ND [facility]    ENDOMETRIAL ABLATION  2015    [facility]    ETHMOIDECTOMY Bilateral 4/9/2019    Performed by Adin Burks III, MD at [facility] OR 2ND [facility]    FESS      FESS Bilateral 10/21/2013    [facility]    FESS, USING COMPUTER-ASSISTED NAVIGATION N/A 4/9/2019    Performed by Adin Burks III, MD at [facility] OR [address] [place] [address]    FINE NEEDLE ASPIRATION (FNA)  of a cervical lymphadenopathy  1/29/2016    Performed by Joann Cifuentes DO at [facility] OR 2ND [facility]    flex bronchoscopy with probable tissue biopsy N/A 7/31/2014    Performed by [facility] at [facility] OR 2ND [facility]    flexible bronchoscopy with tissue biopsy N/A 12/11/2014    [facility]    HYSTERECTOMY  04/2017    [facility]    INJECTION-VOCAL CORD Left 6/24/2014    [facility]    AXRMGOFPG-NQBQ-L-CATH to right chest and removal of portacath to left chests wall. Bilateral  6/24/2014    Performed by Zhen Dorado MD at [facility] OR [address] [place] [address]    LARYNX SURGERY  2016    LUNG TRANSPLANT Bilateral 6/12/14    MAXILLARY ANTROSTOMY  4/9/2019    [facility]    MYRINGOTOMY W/ TUBES Right 04/2017    MYRINGOTOMY WITH INSERTION OF PE TUBES Right 4/15/2017    Performed by Gary Null [facility]    PLACEMENT-TUBE-PEG  5/15/2014    Performed by David Moses [facility]    PORTACATH PLACEMENT Right     rt sc    REMOVAL-TUBE-PEG  5/15/2014    Performed by David Moses MD at [facility] OR [address] [place] [address]    RHC for lung re-transplant N/A 1/22/2019    Performed by Laura Rachel MD at [facility] CATH LAB    ROBOTIC ASSISTED LAPAROSCOPIC HYSTERECTOMY N/A 4/25/2017    Performed by Deny Dias Jr., MD at [facility] OR    SALPINGECTOMY Bilateral 2015    [facility]    SALPINGECTOMY-LAPAROSCOPIC Bilateral 5/19/2015    Performed by Deny Dias Jr., MD at [facility] OR    SINUS SURGERY FUNCTIONAL ENDOSCOPIC WITH NAVIGATION Bilateral 4/3/2017    Performed by Cesar Olsen MD at [facility] OR [address] [place] [address]    SINUS SURGERY FUNCTIONAL ENDOSCOPIC WITH NAVIGATION, 80970, 80334, 54745, 41711 Bilateral 2/5/2015    Performed by [facility]lsen [facility] at [facility]    SPHENOIDECTOMY Bilateral 4/9/2019    Performed by Adin Burks III, MD at [facility] OR [address] [place] [address]    THYROPLASTY - Medialization laryngoplasty, cricothyroid subluxation, arytenoid repositioning Left 8/2/2016    Performed by Gayr Null MD at [facility]    TRANSPLANT-LUNG Bilateral 6/11/2014    Performed by Adolfo Huston MD at [facility] OR [address] [place] [address]       Social History     Socioeconomic History    Marital status: Single     Spouse name: Not on file    Number of children: Not on file    Years of education: Not on file    Highest education level: Not on file   Occupational History    Not on file   Social Needs    Financial resource strain: Not on file    Food insecurity:     Worry:  Not on file     Inability: Not on file    Transportation needs:     Medical: Not on file     Non-medical: Not on file   Tobacco Use    Smoking status: Never Smoker    Smokeless tobacco: Never Used   Substance and Sexual Activity    Alcohol use: No     Comment: Has not had an alcoholic drink in more than 2 months.    Drug use: No    Sexual activity: Yes     Partners: Male     Birth control/protection: See Surgical Hx     Comment: current partner since Oct 2016   Lifestyle    Physical activity:     Days per week: Not on file     Minutes per session: Not on file    Stress: Not on file   Relationships    Social connections:     Talks on phone: Not on file     Gets together: Not on file     Attends Religion service: Not on file     Active member of club or organization: Not on file     Attends meetings of clubs or organizations: Not on file     Relationship status: Not on file   Other Topics Concern    Not on file   Social History Narrative    Not on file         CBC:   Recent Labs     19  0940   WBC 8.55   RBC 3.84*   HGB 9.3*   HCT 32.1*      MCV 84   MCH 24.2*   MCHC 29.0*       CMP:   Recent Labs     19  0940      K 4.1      CO2 24   BUN 24*   CREATININE 0.9   *   MG 1.5*   CALCIUM 9.2   ALBUMIN 3.3*   PROT 7.4   ALKPHOS 281*   *   AST 21   BILITOT 0.2       INR  No results for input(s): PT, INR, PROTIME, APTT in the last 72 hours.        Diagnostic Studies:      EKD Echo:  No results found. However, due to the size of the patient record, not all encounters were searched. Please check Results Review for a complete set of results.      Anesthesia Evaluation    I have reviewed the Patient Summary Reports.     I have reviewed the Medications.     Review of Systems  Anesthesia Hx:  History of prior surgery of interest to airway management or planning: Denies Family Hx of Anesthesia complications.  Personal Hx of Anesthesia complications, Post-Operative  Nausea/Vomiting, in the past, but not with recent anesthetics / prophylaxis       Physical Exam  General:  Well nourished    Airway/Jaw/Neck:  Airway Findings: Mouth Opening: Normal Tongue: Normal  General Airway Assessment: Adult  Mallampati: II  TM Distance: Normal, at least 6 cm  Jaw/Neck Findings:  Neck ROM: Normal ROM      Dental:  Dental Findings: In tact   Chest/Lungs:  Chest/Lungs Findings: Clear to auscultation, Normal Respiratory Rate         Mental Status:  Mental Status Findings:  Cooperative, Alert and Oriented         Anesthesia Plan  Type of Anesthesia, risks & benefits discussed:  Anesthesia Type:  general  Patient's Preference:   Intra-op Monitoring Plan: standard ASA monitors  Intra-op Monitoring Plan Comments:   Post Op Pain Control Plan: multimodal analgesia  Post Op Pain Control Plan Comments:   Induction:   IV  Beta Blocker:  Patient is not currently on a Beta-Blocker (No further documentation required).       Informed Consent: Patient understands risks and agrees with Anesthesia plan.  Questions answered. Anesthesia consent signed with patient.  ASA Score: 3     Day of Surgery Review of History & Physical:    H&P update referred to the surgeon.         Ready For Surgery From Anesthesia Perspective.

## 2019-05-03 NOTE — PLAN OF CARE
Discharge instructions given to patient and caregiver.  Verbalized understanding.  Pain tolerable. Tolerating PO fluids. Surgical and anesthesia consents in chart.

## 2019-05-03 NOTE — ANESTHESIA POSTPROCEDURE EVALUATION
Anesthesia Post Evaluation    Patient: Juanita Ibarra    Procedure(s) Performed: Procedure(s) (LRB):  COLONOSCOPY (N/A)    Final Anesthesia Type: general  Patient location during evaluation: PACU  Patient participation: Yes- Able to Participate  Level of consciousness: awake and alert and oriented  Post-procedure vital signs: reviewed and stable  Pain management: adequate  Airway patency: patent  PONV status at discharge: No PONV  Anesthetic complications: no      Cardiovascular status: hemodynamically stable  Respiratory status: unassisted  Hydration status: euvolemic  Follow-up not needed.          Vitals Value Taken Time   /97 5/3/2019 12:42 PM   Temp 36.3 °C (97.3 °F) 5/3/2019 12:42 PM   Pulse 80 5/3/2019 12:42 PM   Resp 18 5/3/2019 12:42 PM   SpO2 100 % 5/3/2019 12:42 PM         No case tracking events are documented in the log.      Pain/Richard Score: No data recorded

## 2019-05-03 NOTE — DISCHARGE SUMMARY
Discharge Summary/Instructions after an Endoscopic Procedure    Patient Name: Juanita Ibarra  Patient MRN: [id number]  Patient YOB: 1990    Friday, May 03, 2019  Juancho Rich MD    RESTRICTIONS:  During your procedure today, you received medications for sedation.  These medications may affect your judgment, balance and coordination.  Therefore, for 24 hours, you have the following restrictions:     - DO NOT drive a car, operate machinery, make legal/financial decisions, sign important papers or drink alcohol.      ACTIVITY:  Today: no heavy lifting, straining or running due to procedural sedation/anesthesia.  The following day: return to full activity including work.    DIET:  Eat and drink normally unless instructed otherwise.     TREATMENT FOR COMMON SIDE EFFECTS:  - Mild abdominal pain, nausea, belching, bloating or excessive gas:  rest, eat lightly and use a heating pad.  - Sore Throat: treat with throat lozenges and/or gargle with warm salt water.  - Because air was used during the procedure, expelling large amounts of air from your rectum or belching is normal.  - If a bowel prep was taken, you may not have a bowel movement for 1-3 days.  This is normal.      SYMPTOMS TO WATCH FOR AND REPORT TO YOUR PHYSICIAN:  1. Abdominal pain or bloating, other than gas cramps.  2. Chest pain.  3. Back pain.  4. Signs of infection such as: chills or fever occurring within 24 hours after the procedure.  5. Rectal bleeding, which would show as bright red, maroon, or black stools. (A tablespoon of blood from the rectum is not serious, especially if hemorrhoids are present.)  6. Vomiting.  7. Weakness or dizziness.      GO DIRECTLY TO THE NEAREST EMERGENCY ROOM IF YOU HAVE ANY OF THE FOLLOWING:     Difficulty breathing              Chills and/or fever over 101 F   Persistent vomiting and/or vomiting blood   Severe abdominal pain   Severe chest pain   Black, tarry stools   Bleeding- more than one tablespoon   Any  other symptom or condition that you feel may need urgent attention    Your doctor recommends these additional instructions:  If any biopsies were taken, your doctors clinic will contact you in 1 to 2 weeks with any results.    - Discharge patient to home (ambulatory).   - Return to referring physician.   - Repeat colonoscopy in 5 years for screening purposes.    For questions, problems or results please call your physician - Juancho Rich MD at Work:  [phone].    [facility], EMERGENCY ROOM PHONE NUMBER: [phone]    IF A COMPLICATION OR EMERGENCY SITUATION ARISES AND YOU ARE UNABLE TO REACH YOUR PHYSICIAN - GO DIRECTLY TO THE EMERGENCY ROOM.

## 2019-05-03 NOTE — DISCHARGE INSTRUCTIONS
Colonoscopy     A camera attached to a flexible tube with a viewing lens is used to take video pictures.     Colonoscopy is a test to view the inside of your lower digestive tract (colon and rectum). Sometimes it can show the last part of the small intestine (ileum). During the test, small pieces of tissue may be removed for testing. This is called a biopsy. Small growths, such as polyps, may also be removed.   Why is colonoscopy done?  The test is done to help look for colon cancer. And it can help find the source of abdominal pain, bleeding, and changes in bowel habits. It may be needed once a year, depending on factors such as your:  · Age  · Health history  · Family health history  · Symptoms  · Results from any prior colonoscopy  Risks and possible complications  These include:  · Bleeding               · A puncture or tear in the colon   · Risks of anesthesia  · A cancer lesion not being seen  Getting ready   To prepare for the test:  · Talk with your healthcare provider about the risks of the test (see below). Also ask your healthcare provider about alternatives to the test.  · Tell your healthcare provider about any medicines you take. Also tell him or her about any health conditions you may have.  · Make sure your rectum and colon are empty for the test. Follow the diet and bowel prep instructions exactly. If you dont, the test may need to be rescheduled.  · Plan for a friend or family member to drive you home after the test.     Colonoscopy provides an inside view of the entire colon.     You may discuss the results with your doctor right away or at a future visit.  During the test   The test is usually done in the hospital on an outpatient basis. This means you go home the same day. The procedure takes about 30 minutes. During that time:  · You are given relaxing (sedating) medicine through an IV line. You may be drowsy, or fully asleep.  · The healthcare provider will first give you a physical exam to  check for anal and rectal problems.  · Then the anus is lubricated and the scope inserted.  · If you are awake, you may have a feeling similar to needing to have a bowel movement. You may also feel pressure as air is pumped into the colon. Its OK to pass gas during the procedure.  · Biopsy, polyp removal, or other treatments may be done during the test.  After the test   You may have gas right after the test. It can help to try to pass it to help prevent later bloating. Your healthcare provider may discuss the results with you right away. Or you may need to schedule a follow-up visit to talk about the results. After the test, you can go back to your normal eating and other activities. You may be tired from the sedation and need to rest for a few hours.  Date Last Reviewed: 11/1/2016 © 2000-2017 [employer]. [address] [place] [address], [place] [address]. All rights reserved. This information is not intended as a substitute for professional medical care. Always follow your healthcare professional's instructions.

## 2019-05-03 NOTE — TRANSFER OF CARE
Anesthesia Transfer of Care Note    Patient: Juanita Ibarra    Procedure(s) Performed: Procedure(s) (LRB):  COLONOSCOPY (N/A)    Patient location: PACU    Anesthesia Type: general    Transport from OR: Transported from OR on 6-10 L/min O2 by face mask with adequate spontaneous ventilation    Post pain: adequate analgesia    Post assessment: no apparent anesthetic complications    Post vital signs: stable    Level of consciousness: awake, alert and oriented    Nausea/Vomiting: no nausea/vomiting    Complications: none    Transfer of care protocol was followed      Last vitals:   Visit Vitals  BP (!) 143/86 (BP Location: Right arm, Patient Position: Lying)   Pulse 88   Temp 36.8 °C (98.2 °F) (Temporal)   Resp 18   Ht 5' (1.524 m)   Wt 56.7 kg (125 lb)   LMP 10/02/2016   SpO2 97%   Breastfeeding? No   BMI 24.41 kg/m²

## 2019-05-06 NOTE — PROGRESS NOTES
Informed per Aletha Lawson RN, that patient requested that I contact her with a status update since colonoscopy was completed on Friday.    Contacted Gisel and informed her that her case will be discussed in the selection committee meeting tomorrow.  Informed her that I would contact her with the results no later than Wednesday.  She verbalized her understanding.

## 2019-05-07 NOTE — COMMITTEE REVIEW
Native Organ Dx: Cystic Fibrosis    Approved:  Juanita Ibarra was discussed again in selection committee for diagnosis of bronchiolitis obliterans syndrome.  Native diagnosis: cystic fibrosis.  Pain management notes and colonoscopy results reviewed.  All members present agreed that patient is a suitable candidate for retransplantation.  Instructed to repeat a six minute walk test.  Plan is to proceed with listing for bilateral lung transplant once financial clearance is obtained.            I was present during the entire meeting and agree with the statement above.

## 2019-05-07 NOTE — TELEPHONE ENCOUNTER
Contacted Gisel and notified her of the outcome of selection committee meeting.  Informed her that she will be listed once we receive financial clearance from her insurance company.  Informed her that Dr. Alvarez wants to repeat the 6 minute walk test.  She stated that she can do the walk tomorrow after her ENT appointment.  Reminded her that she does need to meet the criteria for the 6 minute walk distance.  Patient verbalized complete understanding and stated that she is ready to be listed.

## 2019-05-08 NOTE — TELEPHONE ENCOUNTER
"Received a call from the patient's significant other Shawn, who reported that the patient asked him to call us regarding "chest pain" the patient is experiencing.  With patient audible in the background, Shawn said her pain "started in her back and moved to her chest."  The patient described the pain as "constant" and "dull" and possibly "heartburn but she's not sure if it is."  Patient reported that her worst level of pain was "8-9" but now she rates it as "5".  Her vital signs are BP = 154/104 then 148/114 and sats = 98% on Oxygen 2 lpm.  Patient added that she felt SOB earlier but her breathing is now back as baseline.  She denied any other health concerns at this time.  Explained that I will update Dr. Alvarez and notify them if he has any recommendations.  Shawn verbalized his understanding.   Message sent to Dr. Alvarez for review.   "

## 2019-05-08 NOTE — PROGRESS NOTES
Subjective:      Juanita Ibarra is a 29 y.o. female who was self-referred for ear pain.    Patient reports bilateral ear pain (L>R) for the past 4 days with yellow drainage from the left ear. She states she had bilateral T tubes placed in both ears 2 years ago at an outside facility.  She states she has tried Ciprodex in the past with benefit. She has a history of lung transplant 5 years ago and states she has been listed for a second transplant. Associated symptoms includes left ear pain and drainage. She denies any hearing loss. She denies fever, chills, nausea, headache, dizziness or change in vision.     Past Medical History  She has a past medical history of Arthritis, Blood transfusion, Bronchiolitis obliterans syndrome, Cystic fibrosis of the lung, Ear infection, Hypertension, Migraine headache, MRSA (methicillin resistant Staphylococcus aureus) carrier, Osteopenia, Other specified disease of pancreas, Pain disorder, Seizures, Sinusitis, chronic, and Vocal cord paralysis.    Past Surgical History  She has a past surgical history that includes Portacath placement (Right); Lung transplant (Bilateral, 6/12/14); Abdominal surgery; Endometrial ablation (2015); Cholecystectomy (2016); Salpingectomy (Bilateral, 2015); Larynx surgery (2016); FESS; Myringotomy w/ tubes (Right, 04/2017); Bronchoscopy (N/A, 5/29/2018); Bronchoscopy (N/A, 10/1/2018); Bronchoscopy (Bilateral, 12/11/2018); Right heart catheterization (N/A, 1/22/2019); Hysterectomy (04/2017); Functional endoscopic sinus surgery (FESS) using computer-assisted navigation (N/A, 4/9/2019); Maxillary antrostomy (4/9/2019); Ethmoidectomy (Bilateral, 4/9/2019); Sphenoidectomy (Bilateral, 4/9/2019); and Colonoscopy (N/A, 5/3/2019).    Family History  Her family history includes Cancer in her maternal grandmother; Diabetes in her maternal grandfather; Drug abuse in her maternal grandfather; Hypertension in her maternal grandmother; Thrombocytopenia in  her maternal grandfather.    Social History  She reports that she has never smoked. She has never used smokeless tobacco. She reports that she does not drink alcohol or use drugs.    Allergies  She is allergic to albuterol; colistin; vancomycin analogues; neupogen [filgrastim]; bactrim [sulfamethoxazole-trimethoprim]; ceftazidime; ceftazidime; dronabinol; haldol [haloperidol lactate]; nsaids (non-steroidal anti-inflammatory drug); adhesive; aztreonam; and ciprofloxacin.    Medications  She has a current medication list which includes the following prescription(s): amlodipine, butalbital-acetaminophen-caffeine -40 mg, calcium-vitamin d3, ciprofloxacin-dexamethasone 0.3-0.1%, dapsone, diphenhydramine, doxycycline, dulera, fexofenadine, fluconazole, fluticasone propionate, folic acid, gabapentin, inhalation spacing device, levalbuterol, levalbuterol, lipase-protease-amylase, lorazepam, magnesium oxide, metoprolol tartrate, montelukast, morphine, multivit,min52-folic-vitk-cq10, centrum flavor burst adult, mycophenolate, omeprazole, omeprazole, ondansetron, oxycodone, polyethylene glycol, prednisone, promethazine, quetiapine, tacrolimus, tacrolimus, tizanidine, topiramate, venlafaxine, and venlafaxine.    Review of Systems   Constitutional: Negative for chills, fever and unexpected weight change.   HENT: Positive for ear discharge and ear pain. Negative for congestion, facial swelling, hearing loss, postnasal drip, sinus pressure, sore throat, tinnitus and trouble swallowing.    Eyes: Negative for pain and visual disturbance.   Respiratory: Positive for cough. Negative for apnea and shortness of breath.    Cardiovascular: Negative for chest pain and palpitations.   Gastrointestinal: Negative for abdominal pain and nausea.   Endocrine: Negative for cold intolerance and heat intolerance.   Musculoskeletal: Negative for joint swelling and neck stiffness.   Skin: Negative for color change and rash.   Neurological:  Negative for dizziness, facial asymmetry and headaches.   Hematological: Negative for adenopathy. Does not bruise/bleed easily.   Psychiatric/Behavioral: Negative for agitation. The patient is not nervous/anxious.           Objective:     BP (!) 120/94 (BP Location: Right arm, Patient Position: Sitting, BP Method: Large (Automatic))   Pulse 96   Wt 56 kg (123 lb 7.3 oz)   LMP 10/02/2016   BMI 24.11 kg/m²      Constitutional:   Vital signs are normal. She appears well-developed and well-nourished.     Head:  Normocephalic and atraumatic.     Ears:    Right Ear: No drainage, swelling or tenderness. No foreign bodies. No mastoid tenderness. Tympanic membrane is not injected, not scarred and not erythematous. No middle ear effusion. No hemotympanum.   Left Ear: There is drainage. No swelling or tenderness. No foreign bodies. No mastoid tenderness. Tympanic membrane is perforated. Tympanic membrane is not injected, not scarred, not erythematous, not retracted and not bulging.  No middle ear effusion. No hemotympanum.   Ears:      Nose:  Nose normal including turbinates, nasal mucosa, sinuses and nasal septum.     Mouth/Throat  Lips, teeth, and gums normal and oropharynx normal.     Neck:  Neck normal without thyromegaly masses, asymmetry, normal tracheal structure, crepitus, and tenderness and no adenopathy.     Cardiovascular:   Normal heart sounds and normal pulses.      Pulmonary/Chest:   Effort normal and breath sounds normal.     Psychiatric:   She has a normal mood and affect.       Procedure    Patient was brought to the minor procedure room and using the operating microscope of the right ear canal which was cleaned of ceruminous debris and crusting from around T tube. There was a small amount of crusting around tube.  Using a suction the patient's cerumen and crusting impaction was removed. Tympanic membrane with T tube in place anteriorly. Pt tolerated well. There were no complications.    Patient was  brought to the minor procedure room and using the operating microscope of the left ear canal which was cleaned clear drainage.  Using a suction the patient's drainage was removed. Tympanic membrane perforation anteriorly. Pt tolerated well. There were no complications.      Data Reviewed    WBC (K/uL)   Date Value   05/01/2019 8.55     Platelets (K/uL)   Date Value   05/01/2019 168      Creatinine (mg/dL)   Date Value   05/01/2019 0.9     TSH (uIU/mL)   Date Value   01/09/2019 3.858     Glucose (mg/dL)   Date Value   05/01/2019 126 (H)     Hemoglobin A1C (%)   Date Value   01/09/2019 4.7        Assessment:     1. Left chronic serous otitis media    2. Complication of transplanted lung, unspecified complication    3. Immunosuppression         Plan:     I had a long discussion with the patient regarding her condition and the further workup and management options.    Juanita was seen today for sinus problem.    Diagnoses and all orders for this visit:    Left chronic serous otitis media  -     ciprofloxacin-dexamethasone 0.3-0.1% (CIPRODEX) 0.3-0.1 % DrpS; Place 4 drops into both ears 2 (two) times daily. for 7 days    Complication of transplanted lung, unspecified complication    Immunosuppression      RTC as needed. If continue to have drainage will consider referral to Otology.

## 2019-05-09 NOTE — PROCEDURES
Juanita Ibarra is a 29 y.o.  female patient, who presents for a 6 minute walk test ordered by Matt Alvarez MD.  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  The patient's BMI is 23.7 kg/m2.  Predicted distance (lower limit of normal) is 555.21 meters.      Test Results:    The test was completed without stopping.  The total time walked was 360 seconds.  During walking, the patient reported:  Dyspnea, Lightheadedness.  The patient used supplemental oxygen during testing.     05/08/2019---------Distance: 274.32 meters (900 feet)     O2 Sat % Supplemental Oxygen Heart Rate Blood Pressure Maris Scale   Pre-exercise  (Resting) 98 % 4 L/M 93 bpm 111/84 mmHg 2   During Exercise 98 % 4 L/M 118 bpm 152/96 mmHg 7-8   Post-exercise  (Recovery) 97 % 4 L/M  97 bpm 144/92 mmHg      Recovery Time:  120 seconds    Performing nurse/tech:  Sergio MANCILLA      PREVIOUS STUDY:   01/22/2019---------Distance: 304.8 meters (1000 feet)       O2 Sat % Supplemental Oxygen Heart Rate Blood Pressure Maris Scale   Pre-exercise  (Resting) 96 % 4 L/M 73 bpm 111/66 mmHg 2   During Exercise 96 % 4 L/M 100 bpm 136/78 mmHg 7-8   Post-exercise  (Recovery) 96 %    75 bpm   mmHg          CLINICAL INTERPRETATION:  Six minute walk distance is 274.32 meters (900 feet) with very heavy dyspnea.  During exercise, there was no significant desaturation while breathing supplemental oxygen.  Both blood pressure and heart rate increased significantly with walking.  The patient reported non-pulmonary symptoms during exercise.  Significant exercise impairment is likely due to subjective symptoms.  The patient did complete the study, walking 360 seconds of the 360 second test.  Since the previous study in January 2019, exercise capacity may be somewhat worse.  Based upon age and body mass index, exercise capacity is less than predicted.

## 2019-05-13 NOTE — TELEPHONE ENCOUNTER
Informed per Rocio, , that the insurance company is requesting the pap smear results, CMV IgG, and EBV IgG results.  Spoke with Rocio and informed her that patient had a hysterectomy and a pap smear was not ordered.  Informed her that the note from gynecology was sent.  Informed her that I had questions regarding the CMV and EBV testing.  She requested that I contact Roxanna, patient's .      Attempted to contact Roxanna.  No answer.  Left a message requesting a return call.    Return call not received from Roxanna.  CMV IgG and EBV IgG to be ordered and scheduled.    Contacted Gisel.  Informed her that additional labs are needed.  Inquired when she could come to have the labs drawn.  She stated that she has an appointment tomorrow at 9am.  She requested that labs be scheduled then.    HLA labs reviewed.  HLA typing last drawn on 10/15/13.  Informed per Caitlin in HLA that HLA typing is good for 5 years, therefore, the HLA organ recipient workup needs to be re-drawn.      Contacted Gisel.  Informed her of the lab appointment for tomorrow.  Also informed her that additional labs for HLA will also be drawn tomorrow.  She verbalized her understanding.

## 2019-05-14 NOTE — PROGRESS NOTES
Outpatient Psychiatry Follow-Up Visit (MD/NP)    2019    Clinical Status of Patient:  Outpatient (Ambulatory)    Chief Complaint:  Juanita Ibarra is a 29 y.o. female who presents today for follow-up of depression and anxiety.  Met with patient.      Interval History and Content of Current Session:  Interim Events/Subjective Report/Content of Current Session: She is pleased that she has been accepted for retransplant.  Working on insurance now.  Seroquel has worked well.  She is taking the whole daily dose at bedtime because of daytime sedation.  She is a little sad.  A good friend and fellow cystic fibrosis patient recently .    Psychotherapy:  · Target symptoms: depression, anxiety   · Why chosen therapy is appropriate versus another modality: relevant to diagnosis  · Outcome monitoring methods: self-report, observation  · Therapeutic intervention type: supportive psychotherapy  · Topics discussed/themes: illness/death of a loved one, stress related to medical comorbidities, building skills sets for symptom management, symptom recognition  · The patient's response to the intervention is accepting. The patient's progress toward treatment goals is good.   · Duration of intervention: 10 minutes.    Review of Systems   · PSYCHIATRIC: Pertinant items are noted in the narrative.    Past Medical, Family and Social History: The patient's past medical, family and social history have been reviewed and updated as appropriate within the electronic medical record - see encounter notes.    Compliance: yes    Side effects: None    Risk Parameters:  Patient reports no suicidal ideation  Patient reports no homicidal ideation  Patient reports no self-injurious behavior  Patient reports no violent behavior    Exam (detailed: at least 9 elements; comprehensive: all 15 elements)   Constitutional  Vitals:  Most recent vital signs, dated less than 90 days prior to this appointment, were reviewed.   Vitals:    19  0918   BP: 118/76   Pulse: 83   Weight: 56.2 kg (124 lb)        General:  age appropriate     Musculoskeletal  Muscle Strength/Tone:  no dyskinesia, no tremor   Gait & Station:  in wheelchair     Psychiatric  Speech:  no latency; no press   Mood & Affect:  anxious  congruent and appropriate   Thought Process:  normal and logical   Associations:  intact   Thought Content:  normal, no suicidality, no homicidality, delusions, or paranoia   Insight:  intact   Judgement: behavior is adequate to circumstances   Orientation:  grossly intact   Memory: intact for content of interview   Language: grossly intact   Attention Span & Concentration:  able to focus   Fund of Knowledge:  intact and appropriate to age and level of education     Assessment and Diagnosis   Status/Progress: Based on the examination today, the patient's problem(s) is/are well controlled.  New problems have not been presented today.   Co-morbidities are complicating management of the primary condition.  There are no active rule-out diagnoses for this patient at this time.     General Impression: Doing well      ICD-10-CM ICD-9-CM   1. Generalized anxiety disorder F41.1 300.02       Intervention/Counseling/Treatment Plan   · Medication Management: Continue current medications.      Return to Clinic: 1 month

## 2019-05-15 NOTE — TELEPHONE ENCOUNTER
LISTING NOTE    Received financial clearance.  Contacted Juanita Ibarra.  Informed her that we will proceed with listing today.  Patient agreeable to being listed.  Verified and updated name, date of birth, social security number, medications, allergies, and telephone numbers.      Awaiting ABO verification.  Message sent to Isaías Astorga, .      Contacted Gisel and informed her that I am waiting for the verification of her blood type.  Informed her that she will not receive any organ offers until the blood type is verified. Reminded patient that she should not travel further away than her home, and if she does, someone must first call to let us (on-call MD / on-call transplant coordinator) know.  Also informed patient that she must notify someone (on-call MD) if she is hospitalized somewhere other than here or if she becomes ill, she must notify us of any medication changes or insurance changes.  Patient also instructed that she must be able to be easily reached by telephone when a donor offer becomes available.  Also informed her that she should leave her cell phone on audible and keep it charged.  Asked patient to tell her back-up caregivers to do the same.  Instructed her to call during regular office hours if she has any non-urgent/non-symptom based questions regarding any part of being listed.  Reminded her that at any point prior to the actual surgery, the transplant could be called off resulting in her return home to continue to wait for another organ offer.  Patient verbalized understanding of all points discussed.      Patient listed today per order of Dr. Alvarez.     Diagnosis: Bronchiolitis Obliterans Syndrome (Lung Re-transplant / Graft failure); Native Diagnosis: Cystic Fibrosis  ABO: B POS  LAS: 36.8356  CPRA: 0%  Class I Specificity: Negative  Class II Specificity: Negative    No prospective crossmatch needed.  Retrospective crossmatch only.    Ht Readings from Last 1  "Encounters:   05/08/19 5' 1" (1.549 m)     Wt Readings from Last 1 Encounters:   05/14/19 56.2 kg (124 lb)     BMI: Estimated body mass index is 23.43 kg/m² as calculated from the following:    Height as of 5/8/19: 5' 1" (1.549 m).    Weight as of 5/14/19: 56.2 kg (124 lb).  "

## 2019-05-15 NOTE — PROGRESS NOTES
Clarified diabetic status with Dr. Morin.  Patient is not diabetic.  HgA1C on 1/9/19 was 4.7.  Patient has a history of steroid induced hyperglycemia.  Medication list reviewed.  No medication for diabetes is on patient's current medication list.  Spoke with patient who confirmed that she is not taking any medications for diabetes.  She stated that she has taken insulin in the past when she was on high dose steroids, but not currently.

## 2019-05-17 NOTE — PROGRESS NOTES
HLA results from 5/14/19 came back with WEAK DP1([id number]).  Will change to a virtual crossmatch at time of organ offer.  Orders for standing virtual crossmatch entered.

## 2019-05-21 NOTE — TELEPHONE ENCOUNTER
"Received a call from Gisel complaining of increased chest congestion with productive cough of dark yellow sputum starting yesterday.  She stated, "My sinuses are a little congested.  I am on doxycycline because I had an ear infection due to a recent sinus infection.  I don't feel like my sinuses are draining and I don't think I have a sinus infection now."  She started doxycycline 100mg twice daily on 5/8/19.  She is taking a 2 week course.  She also has a slight increase in her dyspnea with exertion and is complaining of itching.  She stated, "I am itching on my throat on the outside and my whole torso.  It is like I am having an allergic reaction, but I don't have a rash.  I am just itching.  The itching started yesterday or a couple of days ago."  She denies hemoptysis or any other complaints.  Notified Dr. Alvarez of above.  Instructed to start Vancomycin 750 mg IV via port over 3 hours every 12 hours (0900, 2100) x 14 days. Please administer benadryl 50 mg IV via port every 12 hours (0830, 2030) 30 minutes prior to vancomycin. Please administer Meropenem IV 1 gram via port every 8 hours (0600, 1400, 2200) through x 14 days. Patient will have labs drawn at [facility]:  Vancomycin trough to be drawn prior to the 4th dose and she will have weekly CMP & CBC.    Contacted Gisel and notified her of the above orders.  Informed her that the orders will be sent tomorrow to [facility].  Instructed her to contact me once she starts the medications, so we can arrange for the labs, especially the Vancomycin trough prior to the 4th dose.  She verbalized her understanding of all discussed.  "

## 2019-05-22 NOTE — TELEPHONE ENCOUNTER
LKOI rcvd orders for IV anabiotics. Pt currently established with [facility] (only infusion center that accepts pt's insurance). LOKI faxed orders and documents to [facility] ([phone]). LOKI coordinated referral with [facility]. [facility] will be able to deliverer meds and supplies this evening to pt. LOKI also spoke with pt re: referral. Pt states has spoken with [facility] earlier today. No additional needs voiced at this time. [facility] remains available.

## 2019-05-22 NOTE — TELEPHONE ENCOUNTER
Notified by Lacey Gomez LMSW that patient's infusion provider, [facility], needs updated infusion orders that include the types of medications patient needs to flush her port-a-cath.  Once the medications for port-a-cath are known, the [facility] nursing staff will review the flush procedure with the patient per the agency's central line protocol .    Notified Dr. Alvarez and received orders from [facility] for 10 mL 0.9% normal saline syringes and heparin 100 units/mLsyringes for flushing of patient's port-a-cath.    Infusion orders updated accordingly and will be faxed to [facility].

## 2019-05-23 NOTE — PROGRESS NOTES
[facility] - New Patient    Referring Provider: Dr. Jake Alvarez    CHIEF COMPLAINT: Elevated liver enzymes    HPI: This is a 29 y.o. White female referred for evaluation of elevated liver enzymes.    She has CF s/p lung transplant (2014); currently listed for re-transplant   *On cellcept, prograf, prednisone   *Using 3-4L NC  *Currently on IV meropenem for suspected PNA     Review of labs:  - Transaminases and ALP intermittently elevated since 2013 with periods of complete normalization    *Most recent labs: , AST 21,     - Synthetic liver function: normal with exception of low albumin  - Platelets: normal now though thrombocytopenia noted in the past    Workup thus far:   Negative for viral hepatitis; has immunity to Hep B  CLAIRE negative; IgG normal (1/2019)  Ferritin normal, iron sat low    Abd US (1/23/19): liver normal size (16 cm), no focal hepatic lesions, no biliary dilation, spleen mildly enlarged (12.5 cm), no ascites     US elastography (12/7/18): less than 5% steatosis, F0-F1 (no-mild fibrosis)    -No alcohol use in several years.  -No drug use.  -Meds: Frequent antibiotic use  -No family history of liver disease.      Denies any symptoms of hepatic decompensation including jaundice, dark urine, hematemesis, melena, slowed mentation, abdominal distention, or lower extremity edema.          Review of patient's allergies indicates:   Allergen Reactions    Albuterol Palpitations    Colistin Anaphylaxis    Vancomycin analogues      Infusion reaction that does not resolve with slowing    Neupogen [filgrastim] Other (See Comments)     Ostealgia after five daily doses of 300 mcg.      Bactrim [sulfamethoxazole-trimethoprim] Hives    Ceftazidime Hives     Pt stated can tolerate cefapine not ceftazidime    Ceftazidime     Dronabinol Other (See Comments)     Mental changes/hallucinations    Haldol [haloperidol lactate] Other (See Comments)     Seizure like activity     Nsaids (non-steroidal anti-inflammatory drug)      Cannot have due to lung transplant    Adhesive Rash     Cloth tape- please use tegaderm or paper tape    Aztreonam Rash    Ciprofloxacin Nausea And Vomiting     Projectile N/V, per patient.  Unwilling to retry therapy.        Current Outpatient Medications on File Prior to Visit   Medication Sig Dispense Refill    amLODIPine (NORVASC) 10 MG tablet Take 1 tablet (10 mg total) by mouth once daily. 30 tablet 11    butalbital-acetaminophen-caffeine -40 mg (FIORICET, ESGIC) -40 mg per tablet Take 1 tablet by mouth every 6 (six) hours as needed for Pain. 60 tablet 1    calcium-vitamin D3 (OS-KATY 500 + D3) 500 mg(1,250mg) -200 unit per tablet Take 1 tablet by mouth 2 (two) times daily with meals. 60 tablet 11    dapsone 100 MG Tab Take 1 tablet (100 mg total) by mouth once daily. 30 tablet 11    diphenhydrAMINE (BENADRYL) 50 MG tablet Take 1 tablet (50 mg total) by mouth every 6 (six) hours as needed for Itching. 1 tablet 0    DULERA 100-5 mcg/actuation HFAA INHALE 1 PUFF BY MOUTH TWICE DAILY 13 g 0    fexofenadine (ALLEGRA) 180 MG tablet Take 180 mg by mouth once daily.      fluticasone propionate (FLONASE) 50 mcg/actuation nasal spray INSERT 2 SPRAYS IN EACH NOSTRIL DAILY 16 g 5    folic acid (FOLVITE) 1 MG tablet Take 1 tablet (1,000 mcg total) by mouth once daily. 30 tablet 11    gabapentin (NEURONTIN) 300 MG capsule Take 1 capsule (300 mg total) by mouth 3 (three) times daily. 90 capsule 11    inhalation spacing device (PROCHAMBER) USE AS DIRECTED 1 Device 0    levalbuterol (XOPENEX HFA) 45 mcg/actuation inhaler Inhale 1-2 puffs into the lungs.      levalbuterol (XOPENEX) 1.25 mg/3 mL nebulizer solution Take 3 mLs (1.25 mg total) by nebulization every 8 (eight) hours as needed for Wheezing or Shortness of Breath. Rescue 72 mL 5    lipase-protease-amylase (CREON) 36,000-114,000- 180,000 unit CpDR Take 4 capsules by mouth 3 (three) times  daily with meals. Take 3 with snacks as needed. 800 capsule 11    LORazepam (ATIVAN) 2 MG Tab Take 1 tablet by mouth every 8 hours as needed for ANXIETY 30 tablet 1    magnesium oxide (MAG-OX) 400 mg (241.3 mg magnesium) tablet Take 1 tablet (400 mg total) by mouth 2 (two) times daily. 60 tablet 11    metoprolol tartrate (LOPRESSOR) 25 MG tablet Take 1 tablet (25 mg total) by mouth 2 (two) times daily. 60 tablet 11    montelukast (SINGULAIR) 10 mg tablet Take 1 tablet (10 mg total) by mouth once daily. 30 tablet 11    morphine (MS CONTIN) 15 MG 12 hr tablet Take 1 tablet by mout 2 times daily 60 tablet 0    multivit,min52-folic-vitK-cQ10 (AQUADEKS) 100-700-10 mcg-mcg-mg Cap cap 1 tab twice daily 60 capsule 0    multivitamin-minerals no.55 (CENTRUM FLAVOR BURST ADULT) Chew       mycophenolate (CELLCEPT) 250 mg Cap Take 2 capsules (500 mg total) by mouth 2 (two) times daily. 120 capsule 11    omeprazole (PRILOSEC) 40 MG capsule Take 1 capsule by mouth once daily (Patient taking differently: Take by mouth 2 (two) times daily before meals. ) 30 capsule 11    omeprazole (PRILOSEC) 40 MG capsule Take 1 capsule (40 mg total) by mouth 2 (two) times daily before meals. 60 capsule 11    ondansetron (ZOFRAN) 8 MG tablet Take 1 tablet (8 mg total) by mouth every 12 (twelve) hours as needed for Nausea. 30 tablet 2    oxycodone (ROXICODONE) 5 MG immediate release tablet Take 10 mg by mouth every 4 (four) hours as needed for Pain.      oxyCODONE (ROXICODONE) 5 MG immediate release tablet Take 1 tablet by mouth 2 times daily. 60 tablet 0    polyethylene glycol (GLYCOLAX) 17 gram PwPk Take 17 g by mouth 2 (two) times daily. 60 packet 11    predniSONE (DELTASONE) 5 MG tablet Take 1 tablet (5 mg total) by mouth once daily. 30 tablet 11    promethazine (PHENERGAN) 25 MG tablet Take 1 tablet (25 mg total) by mouth every 6 (six) hours as needed for Nausea. 60 tablet 2    QUEtiapine (SEROQUEL) 25 MG Tab Take 1 tablet by  mouth in morning and 2 tablets at bedtime 90 tablet 1    tacrolimus (PROGRAF) 0.5 MG Cap Take 1 capsule (0.5 mg total) by mouth every 12 (twelve) hours. 60 capsule 11    tacrolimus (PROGRAF) 1 MG Cap Take 2 capsules (2 mg total) by mouth every 12 (twelve) hours. Daily doses: 2.5 mg twice daily. 90 capsule 11    tiZANidine (ZANAFLEX) 4 MG tablet TAKE 2 TABLETS BY MOUTH EVERY 6 HOURS AS NEEDED 90 tablet 0    topiramate (TOPAMAX) 25 MG tablet Take 1 tablet (25 mg total) by mouth 2 (two) times daily 60 tablet 1    venlafaxine (EFFEXOR-XR) 150 MG Cp24 Take 1 capsule (150 mg total) by mouth once daily. 30 capsule 1    venlafaxine (EFFEXOR-XR) 37.5 MG 24 hr capsule Take 1 capsule (37.5 mg total) by mouth once daily. 30 capsule 1    fluconazole (DIFLUCAN) 150 MG Tab TAKE 1 TABLET BY MOUTH EVERY WEEK 30 tablet 0     No current facility-administered medications on file prior to visit.          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    PSHX:  has a past surgical history that includes Portacath placement (Right); Lung transplant (Bilateral, 6/12/14); Abdominal surgery; Endometrial ablation (2015); Cholecystectomy (2016); Salpingectomy (Bilateral, 2015); Larynx surgery (2016); FESS; Myringotomy w/ tubes (Right, 04/2017); Bronchoscopy (N/A, 5/29/2018); Bronchoscopy (N/A, 10/1/2018); Bronchoscopy (Bilateral, 12/11/2018); Right heart catheterization (N/A, 1/22/2019); Hysterectomy (04/2017); Functional endoscopic sinus surgery (FESS) using computer-assisted navigation (N/A, 4/9/2019); Maxillary antrostomy (4/9/2019); Ethmoidectomy (Bilateral, 4/9/2019); Sphenoidectomy (Bilateral,  4/9/2019); and Colonoscopy (N/A, 5/3/2019).    FAMILY HISTORY:   Family History   Problem Relation Age of Onset    Thrombocytopenia Maternal Grandfather     Drug abuse Maternal Grandfather     Diabetes Maternal Grandfather     Hypertension Maternal Grandmother     Cancer Maternal Grandmother         pancreatic cancer    Breast cancer Neg Hx     Colon cancer Neg Hx     Ovarian cancer Neg Hx     Cirrhosis Neg Hx        SOCIAL HISTORY:   Social History     Tobacco Use   Smoking Status Never Smoker   Smokeless Tobacco Never Used       Social History     Substance and Sexual Activity   Alcohol Use No    Comment: Has not had an alcoholic drink in more than 2 months.       Social History     Substance and Sexual Activity   Drug Use No         Review of Systems   Constitutional: Negative for appetite change, chills, fatigue, fever and unexpected weight change.   Eyes: Negative for visual disturbance.   Respiratory: (+) productive cough, shortness of breath with activity.     Cardiovascular: Negative for chest pain, palpitations and leg swelling.   Gastrointestinal: Negative for abdominal distention, abdominal pain, blood in stool, constipation, diarrhea, nausea and vomiting. No change in stool color.   Genitourinary: Negative for dysuria and hematuria. Denies dark urine.   Musculoskeletal: Negative for arthralgias, gait problem, joint swelling and myalgias.   Skin: Negative for color change, rash and wound. Denies itching.   Neurological: Negative for dizziness, tremors, speech difficulty, and headaches.   Hematological: Does not bruise/bleed easily.   Psychiatric/Behavioral: Negative for confusion, decreased concentration and sleep disturbance. Denies memory loss. Denies depression. The patient is not nervous/anxious.        Physical Exam   Constitutional: Well-nourished. No distress. Alert and oriented to person, place, and time. Pale.  Eyes: No scleral icterus.   Cardiovascular: Normal rate, regular rhythm and  normal heart sounds. No murmur heard.  Pulmonary/Chest: Respiratory effort and breath sounds normal. No respiratory distress. On 3L NC.   Abdominal: Soft, non-tender. Bowel sounds are normal. No distension; no ascites appreciated. There is no palpable hepatosplenomegaly. No hernia or mass.   Musculoskeletal: No edema.   Neurological: No tremor. Coordination and gait normal. No asterixis.    Skin: Skin is warm and dry. No rash or erythema. No jaundice. No telangiectasias or palmar erythema noted.  Psychiatric: Normal mood and affect. Speech, behavior, and thought content normal. No depression or anxiety noted.       /76 (BP Location: Right arm, Patient Position: Sitting, BP Method: Medium (Automatic))   Pulse 93   Ht 5' (1.524 m)   Wt 59.2 kg (130 lb 8.2 oz)   LMP 10/02/2016   SpO2 95%   BMI 25.49 kg/m²         LABS:  Lab Results   Component Value Date    WBC 8.55 05/01/2019    HGB 9.3 (L) 05/01/2019    HCT 32.1 (L) 05/01/2019     05/01/2019     05/01/2019    K 4.1 05/01/2019    CREATININE 0.9 05/01/2019     (H) 05/01/2019    AST 21 05/01/2019    ALKPHOS 281 (H) 05/01/2019    BILITOT 0.2 05/01/2019    BILIDIR 0.1 11/07/2018    ALBUMIN 3.3 (L) 05/01/2019    INR 1.0 01/22/2019    IGGSERUM 1288 01/09/2019    ANASCREEN Negative <1:160 01/09/2019    FERRITIN 33 12/07/2018    FESATURATED 15 (L) 12/07/2018    CHOL 129 01/09/2019    TRIG 86 01/09/2019    LDLCALC 63.8 01/09/2019    HGBA1C 4.7 01/09/2019       Hepatitis A Antibody IgG   Date Value Ref Range Status   01/09/2019 Negative  Final       Hepatitis B Surface Ag   Date Value Ref Range Status   01/09/2019 Negative  Final     Hep B Core Total Ab   Date Value Ref Range Status   01/09/2019 Negative  Final     Hep B S Ab   Date Value Ref Range Status   01/09/2019 Positive (A)  Final     Hepatitis C Ab   Date Value Ref Range Status   01/09/2019 Negative  Final     Immunization History   Administered Date(s) Administered    Cytomegalovirus  Immune Globulin 06/13/2014    DTP 08/23/1994, 08/16/1995    Hepatitis A, Adult 04/04/2018, 01/22/2019    Hepatitis B 08/10/2000, 11/20/2000    Hepatitis B, Pediatric/Adolescent 11/28/2001    Influenza 09/12/2018    Influenza - Quadrivalent - PF 10/04/2017, 09/06/2018    Influenza - Trivalent (ADULT) 11/28/2001, 01/22/2002, 12/10/2002, 11/26/2003, 10/19/2004, 11/15/2005, 11/11/2006, 10/15/2007, 10/28/2009, 11/03/2010, 10/19/2011    Influenza - Trivalent - PF (ADULT) 10/14/2008, 10/22/2012, 11/02/2016    Influenza A (H1N1) 2009 Monovalent - IM - PF 12/01/2009    MMR 08/23/1994, 08/16/1995    Meningococcal Conjugate (MCV4P) 10/24/2008    OPV 08/23/1994, 08/16/1995, 08/10/2000    Pneumococcal Conjugate - 13 Valent 10/02/2017    Pneumococcal Conjugate - 7 Valent 01/22/2002    Pneumococcal Polysaccharide - 23 Valent 01/22/2002    Td (ADULT) 08/10/2000, 12/16/2010    Tdap 01/22/2019    Typhoid - ViCPs 04/06/2018          DIAGNOSTIC STUDIES:  ABD. U/S  Results for orders placed during the hospital encounter of 01/23/19   US Abdomen Complete    Narrative EXAMINATION:  US ABDOMEN COMPLETE    CLINICAL HISTORY:  Awaiting organ transplant status    TECHNIQUE:  Complete abdominal ultrasound (including pancreas, liver, gallbladder, common bile duct, spleen, aorta, IVC, and kidneys) was performed.    COMPARISON:  Liver Doppler ultrasound 12/05/2018    FINDINGS:  Liver: Normal in size, measuring 16.0 cm. Homogeneous echotexture.  No focal hepatic lesions.    Gallbladder: Gallbladder is surgically absent.    Biliary system: Common duct is not dilated, measuring 5 mm.  No intrahepatic ductal dilatation.    Spleen: Mildly enlarged measuring 12.5 x 4.2 cm.    Pancreas: Visualized portions of pancreas appear normal.    Right kidney: Normal in size with no hydronephrosis, measuring 9.6 cm.    Left kidney:  Normal in size with no hydronephrosis, measuring 9.5 cm.    Aorta: No aneurysm.    Inferior vena cava: Normal in  appearance.    Miscellaneous: No ascites.      Impression Cholecystectomy status.    Mild splenomegaly.    Electronically signed by resident: Sami Christianson  Date:    01/23/2019  Time:    09:33    Electronically signed by: Jonathan Messer MD  Date:    01/23/2019  Time:    09:41         ASSESSMENT / PLAN:  29 y.o. White female with:      1. Elevated liver enzymes - Etiology unclear though suspect due to frequent antibiotic use / inflammation from infections. No evidence of chronic liver disease at this time; no fibrosis on elastography testing.     -- Serologic workup to r/o causes of chronic liver disease that can be treated  -- No contraindication to lung transplant from hepatology standpoint    2. Cystic fibrosis s/p lung transplant  3. Lung transplant candidate         Orders Placed This Encounter   Procedures    CLAIRE Screen w/Reflex    Antimitochondrial antibody    Anti-smooth muscle antibody    IgG    Ceruloplasmin    Alpha 1 Antitrypsin Phenotype         Return to clinic pending above results.         Thank you for allowing me to participate in the care of SUKI [facility]  Hepatology and Liver Transplant     Patient was seen in clinic with Dr. Morgan; Case discussed, plan reviewed.

## 2019-05-23 NOTE — LETTER
May 24, 2019      Jake Alvarez MD  [address]  [facility] [address]           [facility] - Hepatology  [address]  [facility] [address]  Phone: [phone]  Fax: [phone]          Patient: Juanita Ibarra   MR Number: [id number]   YOB: 1990   Date of Visit: 5/23/2019       Dear Dr. Jake Alvarez:    Thank you for referring Juanita Ibarra to me for evaluation. Attached you will find relevant portions of my assessment and plan of care.    If you have questions, please do not hesitate to call me. I look forward to following Juanita Ibarra along with you.    Sincerely,    Torri Tyler, NP    Enclosure  CC:  No Recipients    If you would like to receive this communication electronically, please contact [email or web address] or [phone] to request more information on [employer] access.    For providers and/or their staff who would like to refer a patient to [facility], please contact us through our one-stop-shop provider referral line, [facility], at [phone].    If you feel you have received this communication in error or would no longer like to receive these types of communications, please e-mail [email or web address]

## 2019-05-23 NOTE — PROGRESS NOTES
I have reviewed and concur with the ADIS's history, physical, assessment, and plan.  I have personally interviewed and examined the patient at bedside.  See below addendum for my evaluation and additional findings.     29 y.o. female that presents for evaluation of elevated liver tests.    Patient with history of CF and s/p BOLT  Intermittent elevation with periods of complete normalization  US elastography from 12/2018 with F0-1 fibrosis    No contraindication to lung transplant with absence of advanced lung disease  Etiology of elevated liver tests is not clear but most likely related to DILI with frequent antibiotic use and inflammation related to recurrent infections. These should be self limited and show no evidence of causing chronic liver disease  Limited serologic work-up to ensure no treatable causes for elevated liver tests present.  Patient reassured     Patient will return to clinic with Torri Tyler NP

## 2019-05-24 NOTE — PROGRESS NOTES
[facility]LA typing resulted and [facility]

## 2019-05-24 NOTE — TELEPHONE ENCOUNTER
Contacted Gisel to schedule weekly labs for next week since she is on the IV antibiotics.  She spoke with her significant other and they requested to have labs on Tuesday, 5/28/19 in the afternoon.  Also informed Gisel of Dr. Alvarez's instructions for a follow-up appointment upon completion of the IV antibiotics.  Informed her of the testing needed and the date of the appointment (6/6/19).  Informed her that she will need to go to the 9th floor for her PFTs.  Instructed her to follow the times of the appointments since she will now be followed in the pre-transplant listed clinic.  She verbalized her understanding of all discussed.  [facility]

## 2019-05-28 NOTE — TELEPHONE ENCOUNTER
----- Message from Saud Segura sent at 5/28/2019  1:04 PM CDT -----  Contact: Shawn   Needs Advice    Reason for call: Pt not able to make lab appt today and need to reschedule         Communication Preference: [phone]    Additional Information: If available would like lab to be rescheduled for tomorrow afternoon

## 2019-05-28 NOTE — TELEPHONE ENCOUNTER
Pt called to reschedule today's lab appt. She would like to schedule them for tomorrow afternoon. I rescheduled her appts as requested. I was unable to reach pt via phone. I left a detail msg in regards to new appt date and time.

## 2019-05-29 NOTE — TELEPHONE ENCOUNTER
Pt requests refill of Dulera to be sent to [facility].    Discussed refill with Dr. Morin who approves refill.

## 2019-05-29 NOTE — TELEPHONE ENCOUNTER
"She says she normally feels better after a week of IV antibiotics.  Explained it can take the full 2 weeks to feel better, and that she may need an extension depending on her symptoms.  Pt states she is having to use 4L O2 at all times.  States she used to be able to get up and go to the restroom without her oxygen, but needs it now.  Asked what her sats have been.  She states she hasn't checked it.  She grabbed a pulse oximeter and states her sat is 91%.  Asked if she should turn up the liter flow.  Told her to leave it at 4L.  She says she is worried she is becoming resistant to the antibiotics.  Explained that according to her last sputum culture, she is sensitive to the 2 that she is on.  Pt states her "chest is rattling and I am more congested".  Asked if she is using her nebulized Xopenex.  She states she uses it sparingly because it causes her to have "jitters".  Told her I would discuss adding Mucinex with Dr. oMrin.  Pt states she can not tolerate Mucinex as it turns her secretions to liquid and she feels like her airways are "coated". Explained that I would discuss with Dr. Morin and find out if she recommends anything else.  Advised that I may not call her back until tomorrow.  Pt verbalized understanding.    ----- Message from Valeria Banks sent at 5/29/2019  2:43 PM CDT -----  Contact: PATIENT   Needs Advice    Reason for call: PATIENT STATES SHE'S BEEN ON ANTIBIOTICS A WEEK AND DOESN'T SEE ANY IMPROVEMENTS          Communication Preference: [phone]    Additional Information:      "

## 2019-05-30 NOTE — TELEPHONE ENCOUNTER
Reviewed patient's concerns with Dr. Morin.  She does not want to change the current IV therapy plan and she also recommends that patient use Xopenex as needed.  Mucinex could be added if patient is in agreement.  LM for patient stating this. Asked patient to return my call regarding Dulera refill.  Pt had one inhaler prescribed back in February 2018 with no refills.

## 2019-06-05 NOTE — TELEPHONE ENCOUNTER
6/4/19 - Received a call from Shawn, patient's , stating that Gisel is feeling a little better, but she is not back to her baseline.  Shawn stated that Gisel is asking to extend the IV antibiotics which are due to end tomorrow.  Informed Shawn that I would discuss with Dr. Alvarez and would call back with instructions.    Notified Dr. Alvarez of above conversation with Shawn.  Also informed him of Gisel's upcoming appointment on 6/6/19.  Will see Gisel in clinic and will make a determination at the visit regarding IV antibiotics.    6/5/19 - Received a call from Shawn.  Notified him of Dr. Alvarez's instructions.  Informed him that a determination will be made in clinic tomorrow.  He verbalized his understanding and will give the message to Gisel.

## 2019-06-06 NOTE — TELEPHONE ENCOUNTER
Attempted to contact Gisel to see where she wants us to send the prescriptions for the Kaleb and hyper-sal nebs since [facility] unable to provide the medications.  No answer.  Left a message requesting a return call.

## 2019-06-06 NOTE — LETTER
June 7, 2019    Shama Dennsion MD  [address]  [address]  [facility]  [place] [address]    Phone: [phone]  Fax: [phone]          Dear Dr. Shama Dennison      Juanita Ibarra has reached her 5th year anniversary following lung  transplantation.  Attached is the summary of the patients most recent  assessment and plan of care.  Our lung transplant team appreciates your referral  of Juanita Ramirez Stacey and we look forward to continued patient  collaboration with you.     Sincerely,           Jake Alvarez MD    Director, Lung Transplantation    Pulmonary & Critical Care Medicine     [facility]   [address] [place] [id number]   [phone]     /kp

## 2019-06-06 NOTE — TELEPHONE ENCOUNTER
Reviewed LFTs with Dr. Alvarez.  No additional orders necessary at this time.  Will continue with the plan to extend IV antibiotics.

## 2019-06-06 NOTE — PROCEDURES
Juanita Ibarra is a 29 y.o.  female patient, who presents for a 6 minute walk test ordered by Matt Alvarez MD.  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  The patient's BMI is 24.7 kg/m2.  Predicted distance (lower limit of normal) is 548.97 meters.      Test Results:    The test was completed without stopping.  The total time walked was 360 seconds.  During walking, the patient reported:  Dyspnea; Lightheadedness; Body pain.  The patient used supplemental oxygen during testing.     06/06/2019---------Distance: 259.08 meters (850 feet)     O2 Sat % Supplemental Oxygen Heart Rate Blood Pressure Maris Scale   Pre-exercise  (Resting) 98 % 6 L/M 101 bpm 113/88 mmHg 4   During Exercise 97 % 6 L/M 122 bpm 140/87 mmHg 10   Post-exercise  (Recovery) 98 % 6 L/M  115 bpm 135/88 mmHg      Recovery Time:  240 seconds    Performing nurse/tech:  Estopinal RRT      PREVIOUS STUDY:   05/08/2019---------Distance: 274.32 meters (900 feet)       O2 Sat % Supplemental Oxygen Heart Rate Blood Pressure Maris Scale   Pre-exercise  (Resting) 98 % 4 L/M 93 bpm 111/84 mmHg 2   During Exercise 98 % 4 L/M 118 bpm 152/96 mmHg 7-8   Post-exercise  (Recovery) 97 % 4 L/M  97 bpm 144/92 mmHg         CLINICAL INTERPRETATION:  Six minute walk distance is 259.08 meters (850 feet) with maximal severe dyspnea.  During exercise, there was no significant desaturation while breathing supplemental oxygen.  Blood pressure remained stable and Heart rate increased significantly with walking.  Tachycardia was present prior to exercise.  The patient reported non-pulmonary symptoms during exercise.  Significant exercise impairment is likely due to cardiovascular causes and subjective symptoms.  The patient did complete the study, walking 360 seconds of the 360 second test.  Since the previous study in May 2019, exercise capacity is unchanged.  Based upon age and body mass index, exercise capacity is less than  predicted.

## 2019-06-06 NOTE — PROGRESS NOTES
Short Physical Performance Battery was performed in clinic to assess for frailty.  Score= 12.    Reminded Gisel to stay active and to contact us for any new or worsening symptoms.  Gisel stated that she has not heard from the pulmonary rehab department at [facility] regarding the pulmonary rehab.  She stated that she is still interested in attending rehab.  She asked that Lacey call [facility] regarding the status.  Also discussed the extension of the IV antibiotics and need for labs next week.  Gisel stated that she would prefer labs on Monday afternoon because she has family coming in town on Tuesday.  She requested 3pm on 6/10/19.  Informed her that the labs would be scheduled per her request and I would speak with Lacey regarding the pulmonary rehab.  Shawn, patient's , asked for a quote for a 10 liter home concentrator.  Informed him that I would have Lacey get him a quote for the concentrator.  He verbalized his understanding.    Notified Lacey, , of IV antibiotic extension x 7 days, pulmonary rehab, and quote for 10 liter home concentrator.

## 2019-06-06 NOTE — LETTER
June 7, 2019        Shama Dennisno  [address]  [address]  [facility]  [place] [address]  Phone: [phone]  Fax: [phone]     Jake Alvarez  [address]  [facility] [address]  Phone: [phone]  Fax: [phone]             [facility] - [facility] Transplant  [address]  [place] [address]  Phone: [phone]   Patient: Juanita Ibarra   MR Number: [id number]   YOB: 1990   Date of Visit: 6/6/2019       Dear Dr. Shama Dennison, Jake Alvarez    Thank you for referring Juanita Ibarra to me for evaluation. Attached you will find relevant portions of my assessment and plan of care.    If you have questions, please do not hesitate to call me. I look forward to following Juanita Ibarra along with you.    Sincerely,    Jake Alvarez MD    Enclosure    If you would like to receive this communication electronically, please contact [email or web address] or [phone] to request [employer] Link access.    [employer] Link is a tool which provides read-only access to select patient information with whom you have a relationship. Its easy to use and provides real time access to review your patients record including encounter summaries, notes, results, and demographic information.    If you feel you have received this communication in error or would no longer like to receive these types of communications, please e-mail [email or web address]

## 2019-06-06 NOTE — TELEPHONE ENCOUNTER
Pt in clinic this am and reported to have some questions for LOKI. RN coordinator relayed questions.    Pt had order to extend IV antibiotics and wanted to know if she could obtain her nebulizer from them. Pt also requested out of pocket costs on a 10L concentrator (pt did not qualify for one) and states has still not heard from [facility].      LOKI rcvd order to extend pt's IV antibiotics for 7 days. LOKI faxed [employer] (Ph [phone]/fx [phone]). Alicia confirmed receipt.  While on the phone with [employer] LOKI inquired if pt would be able to get lon nebulizer and hypertonic saline nebulizer through them. Alicia states that company does not supply nebs.      LOKI left message with Sadia at [facility] ([phone]/ [phone]). Pt had been referred and was to be contacted upon her dc from the hospital in April. Pt states has not heard from them. Pt states also does not want to begin while on antibiotics. LOKI requested call back from [facility].    Pt inquired about full out of pocket cost for a 10L. Pt states would like one, however did not qualify today. Arina with [facility] ext [phone] states cost would be $1600 and would just need and MD rx.    LOKI contacted pts mobile phone and provided information.Pt reports will speak with spouse and f/u if wanting and RX for concentrator.  Pt states had an additional question about her extended Miropenem. Pt states would like to see if she could reduce run time.  LOKI contacted [employer] [facility] back and spoke  Pharmacist Silverio if Miropenem could be ran any shorter then 85 minutes (per pt's request). Pharmacist states that it can be ran in 1 hour and will contact pt in order to inform.    SW remains available.

## 2019-06-06 NOTE — PROGRESS NOTES
LUNG TRANSPLANT RECIPIENT FOLLOW-UP    Reason for Visit: Follow-up after lung transplantation.                                                                                                         Date of Transplant: 6/12/14                                                                               Reason for Transplant: Cystic Fibrosis                                                                               Type of Transplant: bilateral sequential lung                                                                               CMV Status: D+ / R-                                                                               Major Complications:   1. Vocal cord dysfunction- resolved   2. A1 Rejection 8/2014   3. C glabrata positive sputum-recurrent  4. Pseudomonas pneumonia in 02/2015 resolved   5. 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 CARLOS EDUARDO (grade 3)  7. Chronic respiratory failure                                                                               History of Present Illness: Juanita Ibarra is a 29 y.o. year old female with the above listed transplant history presenting today for evaluation of continued complaints about persistent congestion and an inability to expectorate phlegm.  Patient has been on Vanc and Fozia for MRSA and PMN infection and although she reports an overall improvement, still has symptoms.  Patient has various chronic somatic complaints which she is unable to elaborate on whether there is any acute changes.  She has no been fevering but has ongoing acute on chronic fatigue and generalized myalgias.  Patient is frustrated that she is still symptomatic and would like her antibiotic course extended.     Review of Systems   Constitutional: Positive for malaise/fatigue. Negative for chills, fever and weight loss.   HENT: Positive for congestion. Negative for sinus pain and sore throat.    Eyes: Positive for pain and redness.   Respiratory: Positive for cough, sputum production,  "shortness of breath and wheezing. Negative for hemoptysis.    Cardiovascular: Positive for chest pain. Negative for palpitations, orthopnea, leg swelling and PND.   Gastrointestinal: Positive for abdominal pain, diarrhea, nausea and vomiting. Negative for constipation.   Genitourinary: Negative for dysuria.   Musculoskeletal: Positive for back pain, joint pain and myalgias.   Neurological: Positive for headaches. Negative for dizziness and weakness.   Endo/Heme/Allergies: Positive for environmental allergies.     /87 (BP Location: Right arm, Patient Position: Sitting, BP Method: Medium (Automatic))   Pulse 102   Temp 98 °F (36.7 °C) (Oral)   Resp 20   Ht 5' 1" (1.549 m)   Wt 59 kg (130 lb)   LMP 10/02/2016   SpO2 97% Comment: 5 liters  BMI 24.56 kg/m²     Physical Exam   Constitutional: She is oriented to person, place, and time and well-developed, well-nourished, and in no distress. No distress.   obsese   HENT:   Head: Normocephalic and atraumatic.   Mouth/Throat: No oropharyngeal exudate.   Nasal cannula in place   Eyes: Pupils are equal, round, and reactive to light. Conjunctivae are normal.   Neck: No JVD present.   Cardiovascular: Tachycardia present.   No murmur heard.  Pulmonary/Chest: Effort normal and breath sounds normal. No respiratory distress. She has no wheezes. She has no rales.   Abdominal: Soft. She exhibits no distension. There is tenderness.   Musculoskeletal: Normal range of motion. She exhibits no edema.   Neurological: She is alert and oriented to person, place, and time. No cranial nerve deficit.   Skin: Skin is warm and dry. No rash noted. She is not diaphoretic. No erythema.   Nursing note and vitals reviewed.    Labs:  cbc, cmp, tacrolimus Latest Ref Rng & Units 5/1/2019 5/24/2019 5/29/2019   TACROLIMUS LVL 5.0 - 15.0 ng/mL 4.4(L) - -   WHITE BLOOD CELL COUNT 3.90 - 12.70 K/uL 8.55 6.53 7.78   RBC 4.00 - 5.40 M/uL 3.84(L) 3.95(L) 3.78(L)   HEMOGLOBIN 12.0 - 16.0 g/dL 9.3(L) " 9.3(L) 9.1(L)   HEMATOCRIT 37.0 - 48.5 % 32.1(L) 32.7(L) 31.9(L)   MCV 82 - 98 fL 84 83 84   MCH 27.0 - 31.0 pg 24.2(L) 23.5(L) 24.1(L)   MCHC 32.0 - 36.0 g/dL 29.0(L) 28.4(L) 28.5(L)   RDW 11.5 - 14.5 % 14.9(H) 18.8(H) 19.8(H)   PLATELETS 150 - 350 K/uL 168 209 175   MPV 9.2 - 12.9 fL 10.6 11.1 11.1   GRAN # 1.8 - 7.7 K/uL 4.1 3.8 5.0   LYMPH # 1.0 - 4.8 K/uL 3.5 1.9 2.0   MONO # 0.3 - 1.0 K/uL 0.6 0.5 0.5   EOSINOPHIL% 0.0 - 8.0 % 3.6 3.7 2.6   BASOPHIL% 0.0 - 1.9 % 0.5 0.9 0.5   DIFFERENTIAL METHOD - Automated Automated Automated   SODIUM 136 - 145 mmol/L 139 139 138   POTASSIUM 3.5 - 5.1 mmol/L 4.1 4.3 4.2   CHLORIDE 95 - 110 mmol/L 106 106 101   CO2 23 - 29 mmol/L 24 28 31(H)   GLUCOSE 70 - 110 mg/dL 126(H) 120(H) 116(H)   BUN BLD 6 - 20 mg/dL 24(H) 16 16   CREATININE 0.5 - 1.4 mg/dL 0.9 0.9 0.8   CALCIUM 8.7 - 10.5 mg/dL 9.2 8.8 9.5   PROTEIN TOTAL 6.0 - 8.4 g/dL 7.4 6.8 7.5   ALBUMIN 3.5 - 5.2 g/dL 3.3(L) 2.8(L) 3.0(L)   BILIRUBIN TOTAL 0.1 - 1.0 mg/dL 0.2 0.3 0.3   ALK PHOS 55 - 135 U/L 281(H) 367(H) 299(H)   AST 10 - 40 U/L 21 25 37   ALT 10 - 44 U/L 103(H) 41 37   ANION GAP 8 - 16 mmol/L 9 5(L) 6(L)   EGFR IF AFRICAN AMERICAN >60 mL/min/1.73 m:2 >60.0 >60.0 >60.0   EGFR IF NON-AFRICAN AMERICAN >60 mL/min/1.73 m:2 >60.0 >60.0 >60.0     Pulmonary Function Tests 6/6/2019 5/1/2019 1/9/2019 11/26/2018 10/22/2018 6/18/2018 4/23/2018   FVC 1.42 1.45 1.79 1.85 2.23 2.52 2.21   FEV1 0.59 0.61 0.71 0.78 0.9 0.93 0.88   TLC (liters) - - - - - - -   DLCO (ml/mmHg sec) - - - - - - -   FVC% 44 42.5 56 54.2 65.3 78 69   FEV1% 21 20.9 25 26.6 30.9 33 31   FEF 25-75 - 0.24 0.28 0.33 0.35 0.35 0.36   FEF 25-75% - 7.4 8 9.8 10.5 11 11   TLC% - - - - - - -   RV - - - - - - -   RV% - - - - - - -   DLCO% - - - - - - -       Imaging:  Results for orders placed during the hospital encounter of 06/06/19   X-Ray Chest PA And Lateral    Narrative EXAMINATION:  XR CHEST PA AND LATERAL    CLINICAL HISTORY:  Lung transplant  status    TECHNIQUE:  PA and lateral views of the chest were performed.    COMPARISON:  Chest radiograph 05/01/2019    FINDINGS:  Right chest port with tip in superior vena cava.  Sternal wires are aligned and intact.    The lungs are clear, with normal appearance of pulmonary vasculature and no pleural effusion or pneumothorax.    The cardiac silhouette is normal in size. The hilar and mediastinal contours are unremarkable.    Bones are intact.      Impression No acute intrathoracic abnormality identified.    Electronically signed by resident: Hi Loera  Date:    06/06/2019  Time:    09:34    Electronically signed by: Christos Deleon MD  Date:    06/06/2019  Time:    10:38       Assessment:  1. Bronchiolitis obliterans syndrome    2. Cystic fibrosis with pulmonary manifestations    3. Lung replaced by transplant    4. Staphylococcus aureus pneumonia    5. Immunosuppression    6. Prophylactic antibiotic      Plan:   · FEV1 and FVC decreased from baseline but largely unchanged from her May PFTs.  Recent Cx with MRSA and PMN for which she has been treated with Vanc and Fozia.  Will extend Abx course of Vanc and Fozia for another week given persistence of symptoms  · Beginning inhaled Tobramycin. Restarting 3% hypertonic nebs for airway clearance.  Will consider increasing to 7% nebs is there is a lack of response  · Last Tacrolimus level was low.  Continue tacrolimus 2.5mg BID, MMF 500mg BID, Prednisone 5 mg.  Continue Dapsone 100mg  · Continue Singulair, Flonase for sinusitis symptoms    Jhoan Antunez MD  [facility]    Attending Note:    I have seen and evaluated the patient with the fellow. Their note reflects the content of our discussion and my plan of care. LFT's reviewed, will have to wait until they are normalized before re-starting antibiotics.      Jake Alvarez MD  Pulmonary/Critical Care Medicine

## 2019-06-07 NOTE — TELEPHONE ENCOUNTER
Received instructions from Dr. Alvarez to hold IV antibiotics and repeat CMP on Monday.      Attempted to contact Gisel to inform her of Dr. Alvarez's instructions to hold off on the IV antibiotics and repeat labs on Monday.  No answer.  Left a message to return the call.    Contacted Shawn, patient's .  Informed him that I left a message for Gisel yesterday and this morning.  He stated that Gisel is still sleeping.  Notified Shawn of the lab results and Dr. Alvarez's instructions to hold the IV antibiotics until labs are repeated, resulted and reviewed on Monday.  Informed him that we need to reschedule the labs from 3pm to the morning, so we can get the results early enough to review.  He requested to reschedule the labs to 10am.  Instructed him to have Gisel contact me for any questions.  Also instructed him to call the MD on call or to go to the ER over the weekend for any worsening symptoms or complaints.  He verbalized his understanding of all discussed.  Informed him that I also left a message for Gisel yesterday regarding where to send the prescriptions for the Kaleb and Hyper-sal.  He requested that the prescriptions be sent to [facility].  Informed him that the prescriptions will be sent electronically once Dr. Alvarez signs the order.  Informed him that the Kaleb nebs need [facility].  He again verbalized his understanding.    Contacted [facility] and spoke with Vince.  Informed him of Dr. Alvarez's orders to hold the IV antibiotics due to elevated liver function tests.  Informed him that labs will be repeated on Monday, 6/10 and a decision made after the labs are resulted.  Vince verbalized his understanding.  He stated that the medications were sent yesterday.  He will hold the shipment for Monday until additional orders are received.

## 2019-06-07 NOTE — TELEPHONE ENCOUNTER
Received a call from alex Gruber, stating that insurance will only cover once a day dosing of the Prilosec.  Prilosec dose changed to daily.  Also the Hyper-sal needs to be changed to 3% instead of 3.5%.  Requested that the prescription for Hyper-sal be resent to the pharmacy.

## 2019-06-07 NOTE — TELEPHONE ENCOUNTER
LVM for callback to inform patient that [facility] received prescription for Kaleb and prior authorization is required.  [facility] will be back in touch once insurance determination is received.

## 2019-06-10 NOTE — TELEPHONE ENCOUNTER
CMP results from today reviewed with Dr. Alvarez.  Orders received to restart the IV antibiotics (Meropenem and Vancomycin) as previously instructed on 6/6/19.  CMP, CBC to be repeated in 1 week.    Received a call from Vince with [facility].  Notified him of Dr. Alvarez's orders to restart the IV antibiotics and complete the additional 7 days.  He verbalized his understanding.      Contacted Gisel.  Notified her of Dr. Alvarez's orders to restart the IV antibiotics and complete the 7 additional days.  She verbalized her understanding.  She stated that she is more congested than she was prior to initiating the antibiotics.  Instructed her to restart the antibiotics and to keep us updated on her symptoms.  Labs to be scheduled for Monday, 6/17/19 at 3pm.  She verbalized her understanding of all discussed.

## 2019-06-12 NOTE — TELEPHONE ENCOUNTER
DOCUMENTATION ONLY  FYI  Tobramycin does not require a Prior Authorization through the patient's insurance.    Copay: $0    Patient Assistance IS NOT required.    Forwarded to the clinical pharmacist for consult and shipment.    -ARR

## 2019-06-17 PROBLEM — Z76.82 AWAITING ORGAN TRANSPLANT: Status: ACTIVE | Noted: 2019-01-01

## 2019-06-17 NOTE — TELEPHONE ENCOUNTER
"Received a call from Gisel requesting to extend the IV antibiotics for another week.  She stated, "I am still really congested.  I am not better.  I am not well."  She is complaining of chest congestion and a constant cough productive of "dark, dark yellow" sputum.  Her dyspnea is unchanged.  She is needing to wear her oxygen continuously at 5 liters.  She is unable to walk to the bathroom without using the oxygen.  She denies any other complaints.  Inquired if her symptoms have improved since extending the antibiotics.  She stated that she has had some improvement, but she is not at her baseline.  She informed me that the pharmacy is still working on the authorization for the Kaleb nebs, so she hasn't received the medication yet.  Inquired if she started the hypertonic saline nebs.  She stated that she has been scared to start the hypertonic saline nebs because she is already coughing so much.  She doesn't want to cough more.  She also stated that her aunt has her concentrator from before her first transplant.  She inquired if she could use it.  She stated that she wanted to make sure that it was okay.  She stated that she doesn't want to have any cross contamination.  Informed Gisel that I would notify Dr. Alvarez of above.  Also informed her that we would need to check her lab results.  She verbalized her understanding and stated that she is on the way to the lab now.          Notified Dr. Alvarez of above conversation with Giesl.  Instructions received to discontinue the antibiotics.  Gisel can use her old concentrator.      Contacted Gisel and notified her of organ offer.   Informed her that it is a standard donor and not a PHS increased risk donor.  Informed her that we are still awaiting OR times, but she will be notified of the OR time once it has been determined.  Instructed Gisel not to eat or drink anything from this point forward; to take a shower; to leave her valuables at home; to bring her current " insurance card and medication list; to bring enough Oxygen for trip to the hospital and for a trip home if surgery is cancelled; to keep cell phone audible and charged, and to report to admit office at 16:00.  Reminded Gisel that the transplant can be called off if the donor deteriorates or if the lungs are unsuitable for transplant.  She verbalized her understanding of all discussed.  She inquired if she needed to contact her mom and have her drive from [place].  Informed Gisel that she can contact her mom.  Reminded her that we won't know whether or not she will receive the transplant until the donor lungs are visualized.  Informed her that her mom may drive here just to go back home if the transplant is canceled.  She again verbalized her understanding.  She denied any new complaints from this morning.  Shawn, patient's spouse, asked how long he would need to take off of work.  Informed Shawn that we require a caregiver 24 hours a day / 7 days a week upon patient's transfer to [facility] until at least 3 months from discharge.  Informed him that we don't know how long she will be in the ICU or in the hospital after the transplant.  Shawn stated that he works from home and will be able to work while he is staying in the hospital.  He stated that he will take a couple of days off initially.  Instructed both Shawn and Gisel to contact us for any further questions or concerns.  They denied any further questions at this time.

## 2019-06-17 NOTE — ASSESSMENT & PLAN NOTE
Juanita Ibarra is on 2L of oxygen.  She is on no assisted ventilation.  Her New York Heart Association Class is 50% - Requires considerable assistance and frequent medical care.  She has been active on the lung transplant list since 5/15/2019 with an LAS of 36.8.  She is 5 years out from her first bilateral lung transplant for a diagnosis of CF and is now listed for re-transplant given her progressive end stage CARLOS EDUARDO.  She is not diabetic.      She was called in for a donor offer this afternoon for which she accepted.  Donor is not PHS increased risk.  All questions were answered and we will prepare for BLT.

## 2019-06-17 NOTE — TELEPHONE ENCOUNTER
Patient presented to [facility] to check status on Kaleb nebulizer solution. Informed her it was approved with a $0 copay. She did not have time to wait for the medication to be filled. Patient requested to call her  Shawn at [phone] to discuss. NOE and Aileen message sent.    Ac Osborn, PharmD  Clinical Pharmacist   [facility]   P: [phone]

## 2019-06-17 NOTE — SUBJECTIVE & OBJECTIVE
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    Cystic fibrosis of the lung     Ear infection     Hypertension     Migraine headache     MRSA (methicillin resistant Staphylococcus aureus) carrier     Osteopenia     Other specified disease of pancreas     Pain disorder     Seizures 2013    Sinusitis, chronic     Vocal cord paralysis 06/2014    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       Past Surgical History:   Procedure Laterality Date    ABDOMINAL SURGERY      peg tube     [id number] N/A 5/19/2015    Performed by Deny Dias Jr., MD at [facility], [place] [facility] by [facility] OR    BIOPSY-BRONCHUS N/A 12/20/2016    Performed by Jake Alvarez MD at [facility] OR 2ND FLR    BRONCHOSCOPY Bilateral 12/11/2018    Performed by Francisca Morin DO at [facility] OR 2ND [facility]LR    BRONCHOSCOPY N/A 10/1/2018    Performed by Jake Alvarez MD at [facility] OR 2ND [facility]    BRONCHOSCOPY Bilateral 12/20/2016    Performed by Jake Alvarez MD at [facility] OR 2ND [facility]    BRONCHOSCOPY - flexible bronchoscopy with probable tissue biopsy CPT 04981 N/A 7/21/2015    Performed by Jake Alvarez MD at [facility] OR 2ND [facility]    BRONCHOSCOPY - flexible bronchoscopy with probable tissue biospy CPT 22459 N/A 10/20/2015    Performed by Jake Alvarez MD at [facility] OR 2ND [facility]    BRONCHOSCOPY - flexible bronchoscopy with tissue biopsy CPT 23952 N/A 9/29/2015    Performed by Jake Alvarze MD at [facility] OR 2ND [facility]    BRONCHOSCOPY - flexible bronchoscopy with tissue biopsy CPT 96443 N/A 5/26/2015    Performed by Jake Alvarez MD at [facility] OR 1ST FLR    BRONCHOSCOPY flexible bronchoscopy with tissue biopsy N/A 9/8/2014    Performed by Jake Alvarez MD at [facility] OR 2ND [facility]    BRONCHOSCOPY flexible with possible tissue biopsy N/A 8/8/2014    Performed by Jake Alvarez MD at [facility] OR 2ND [facility]    BRONCHOSCOPY, BAL, BIOPSIES N/A 3/20/2018    Performed by Jake Alvarez MD at  [facility] OR 2ND [facility]LR    BRONCHOSCOPY-FIBEROPTIC N/A 1/29/2016    Performed by Joann Cifuentes DO at [facility] OR 2ND [facility]LR    BRONCHOSCOPY; Bronchoscopy with BAL and transbronchial biopsies under general anesthesia N/A 5/29/2018    Performed by Jake Alvarez MD at [facility] OR 2ND [facility]LR    CHOLECYSTECTOMY  2016    CHOLECYSTECTOMY-LAPAROSCOPIC N/A 7/22/2016    Performed by Joshua Goldberg, MD at [facility] OR 2ND [facility]LR    COLONOSCOPY N/A 5/3/2019    Performed by Juancho Rich MD at [facility] ENDO (2ND FLR)    ENDOMETRIAL ABLATION  2015    KJB    ETHMOIDECTOMY Bilateral 4/9/2019    Performed by Adin Burks III, MD at [facility] OR 2ND [facility]LR    FESS      FESS Bilateral 10/21/2013    Performed by Jared Whatley MD at [facility] OR 2ND [facility]LR    FESS, USING COMPUTER-ASSISTED NAVIGATION N/A 4/9/2019    Performed by Adin Burks III, MD at [facility] OR [address] [place] [address]    FINE NEEDLE ASPIRATION (FNA)  of a cervical lymphadenopathy  1/29/2016    Performed by Joann Cifuentes DO at [facility] OR 2ND FLR    flex bronchoscopy with probable tissue biopsy N/A 7/31/2014    Performed by [facility] Diagnostic Provider at [facility] OR 2ND FLR    flexible bronchoscopy with tissue biopsy N/A 12/11/2014    [facility]    HYSTERECTOMY  04/2017    [facility]    INJECTION-VOCAL CORD Left 6/24/2014    [facility]    [id number]-CATH to right chest and removal of portacath to left chests wall. Bilateral 6/24/2014    Performed by Zhen Dorado MD at [facility] OR 2ND [facility]LR    LARYNX SURGERY  2016    LUNG TRANSPLANT Bilateral 6/12/14    MAXILLARY ANTROSTOMY  4/9/2019    Performed by [facility]    MYRINGOTOMY W/ TUBES Right 04/2017    MYRINGOTOMY WITH INSERTION OF PE TUBES Right 4/15/2017    Performed by Gary Null MD at [facility] OR 2ND FLR    PLACEMENT-TUBE-PEG  5/15/2014    Performed [facility] David Moses [facility]    PORTACATH PLACEMENT Right      rt sc    REMOVAL-TUBE-PEG  5/15/2014    Performed by David Moses MD at [facility] OR 2ND [facility]    RHC for lung re-transplant N/A 1/22/2019    Performed by Laura Rachel MD at [facility] CATH LAB    ROBOTIC ASSISTED LAPAROSCOPIC HYSTERECTOMY N/A 4/25/2017    Performed by Deny Dias Jr., MD at [facility], [place], operated by [facility] OR    SALPINGECTOMY Bilateral 2015    KJB    SALPINGECTOMY-LAPAROSCOPIC Bilateral 5/19/2015    Performed by Deny Dias Jr., MD at [facility], [place], operated by [facility] OR    SINUS SURGERY FUNCTIONAL ENDOSCOPIC WITH NAVIGATION Bilateral 4/3/2017    Performed by Cesar Olsen MD at [facility] OR 2ND [facility]LR    SINUS SURGERY FUNCTIONAL ENDOSCOPIC WITH NAVIGATION, 82921, 50234, 37314, 96357 Bilateral 2/5/2015    [facility]    SPHENOIDECTOMY Bilateral 4/9/2019    Performed by Adin Burks III, MD at [facility] OR [address] [place] [address]    THYROPLASTY - Medialization laryngoplasty, cricothyroid subluxation, arytenoid repositioning Left 8/2/2016    Performed by Gary Null MD at [facility] OR 2ND [facility]    TRANSPLANT-LUNG Bilateral 6/11/2014    Performed by Adolfo Huston MD at [facility] OR [address] [place] [address]       Review of patient's allergies indicates:   Allergen Reactions    Albuterol Palpitations    Colistin Anaphylaxis    Vancomycin analogues      Infusion reaction that does not resolve with slowing  Pt. States she can tolerate it when given with 50 mg Benadryl and ran over 3 hours    Neupogen [filgrastim] Other (See Comments)     Ostealgia after five daily doses of 300 mcg.      Bactrim [sulfamethoxazole-trimethoprim] Hives    Ceftazidime Hives     Pt stated can tolerate cefapine not ceftazidime    Ceftazidime     Dronabinol Other (See Comments)     Mental changes/hallucinations    Haldol [haloperidol lactate] Other (See Comments)     Seizure like activity    Nsaids (non-steroidal anti-inflammatory drug)      Cannot have due to lung transplant    Adhesive Rash     Cloth tape- please use tegaderm or paper tape    Aztreonam  Rash    Ciprofloxacin Nausea And Vomiting     Projectile N/V, per patient.  Unwilling to retry therapy.       PTA Medications   Medication Sig    amLODIPine (NORVASC) 10 MG tablet Take 1 tablet (10 mg total) by mouth once daily.    butalbital-acetaminophen-caffeine -40 mg (FIORICET, ESGIC) -40 mg per tablet Take 1 tablet by mouth every 6 (six) hours as needed for Pain.    calcium-vitamin D3 (OS-KATY 500 + D3) 500 mg(1,250mg) -200 unit per tablet Take 1 tablet by mouth 2 (two) times daily with meals.    dapsone 100 MG Tab Take 1 tablet (100 mg total) by mouth once daily.    diphenhydrAMINE (BENADRYL) 50 MG tablet Take 1 tablet (50 mg total) by mouth every 6 (six) hours as needed for Itching.    fexofenadine (ALLEGRA) 180 MG tablet Take 180 mg by mouth once daily.    fluconazole (DIFLUCAN) 150 MG Tab TAKE 1 TABLET BY MOUTH EVERY WEEK    fluticasone propionate (FLONASE) 50 mcg/actuation nasal spray INSERT 2 SPRAYS IN EACH NOSTRIL DAILY    folic acid (FOLVITE) 1 MG tablet Take 1 tablet (1,000 mcg total) by mouth once daily.    gabapentin (NEURONTIN) 300 MG capsule Take 1 capsule (300 mg total) by mouth 3 (three) times daily.    inhalation spacing device (PROCHAMBER) USE AS DIRECTED    levalbuterol (XOPENEX HFA) 45 mcg/actuation inhaler Inhale 1-2 puffs into the lungs.    levalbuterol (XOPENEX) 1.25 mg/3 mL nebulizer solution Take 3 mLs (1.25 mg total) by nebulization every 8 (eight) hours as needed for Wheezing or Shortness of Breath. Rescue    lipase-protease-amylase (CREON) 36,000-114,000- 180,000 unit CpDR Take 4 capsules by mouth 3 (three) times daily with meals. Take 3 with snacks as needed. (Patient taking differently: Take 5 capsules by mouth 3 (three) times daily with meals. )    LORazepam (ATIVAN) 2 MG Tab Take 1 tablet by mouth every 8 hours as needed for ANXIETY    magnesium oxide (MAG-OX) 400 mg (241.3 mg magnesium) tablet Take 1 tablet (400 mg total) by mouth 2 (two) times daily.     metoprolol tartrate (LOPRESSOR) 25 MG tablet Take 1 tablet (25 mg total) by mouth 2 (two) times daily.    mometasone-formoterol (DULERA) 100-5 mcg/actuation HFAA Inhale 1 puff into the lungs 2 (two) times daily. Controller    montelukast (SINGULAIR) 10 mg tablet Take 1 tablet (10 mg total) by mouth once daily.    morphine (MS CONTIN) 15 MG 12 hr tablet Take 1 tablet by mouth twice a day    multivit,min52-folic-vitK-cQ10 (AQUADEKS) 100-700-10 mcg-mcg-mg Cap cap 1 tab twice daily    multivitamin-minerals no.55 (CENTRUM FLAVOR BURST ADULT) Chew     mycophenolate (CELLCEPT) 250 mg Cap Take 2 capsules (500 mg total) by mouth 2 (two) times daily.    omeprazole (PRILOSEC) 40 MG capsule Take 40 mg by mouth every morning.    ondansetron (ZOFRAN) 8 MG tablet Take 1 tablet (8 mg total) by mouth every 12 (twelve) hours as needed for Nausea.    oxycodone (ROXICODONE) 5 MG immediate release tablet Take 10 mg by mouth every 4 (four) hours as needed for Pain.    oxyCODONE (ROXICODONE) 5 MG immediate release tablet take 1 tablet by mouth twice a day    polyethylene glycol (GLYCOLAX) 17 gram PwPk Take 17 g by mouth 2 (two) times daily.    predniSONE (DELTASONE) 5 MG tablet Take 1 tablet (5 mg total) by mouth once daily.    promethazine (PHENERGAN) 25 MG tablet Take 1 tablet (25 mg total) by mouth every 6 (six) hours as needed for Nausea.    QUEtiapine (SEROQUEL) 25 MG Tab Take 1 tablet by mouth in morning and 2 tablets at bedtime    sodium chloride 3% 3 % nebulizer solution Take 4 mLs by nebulization once daily.    tacrolimus (PROGRAF) 0.5 MG Cap Take 1 capsule (0.5 mg total) by mouth every 12 (twelve) hours.    tacrolimus (PROGRAF) 1 MG Cap Take 2 capsules (2 mg total) by mouth every 12 (twelve) hours. Daily doses: 2.5 mg twice daily.    tiZANidine (ZANAFLEX) 4 MG tablet TAKE 2 TABLETS BY MOUTH EVERY 6 HOURS AS NEEDED    tobramycin with nebulizer 300 mg/5 mL Nebu Take 300 mg by nebulization every 12 (twelve)  hours.    topiramate (TOPAMAX) 25 MG tablet Take 1 tablet (25 mg total) by mouth 2 (two) times daily    venlafaxine (EFFEXOR-XR) 150 MG Cp24 Take 1 capsule (150 mg total) by mouth once daily.    venlafaxine (EFFEXOR-XR) 37.5 MG 24 hr capsule Take 1 capsule (37.5 mg total) by mouth once daily.     Family History     Problem Relation (Age of Onset)    Cancer Maternal Grandmother    Diabetes Maternal Grandfather    Drug abuse Maternal Grandfather    Hypertension Maternal Grandmother    Thrombocytopenia Maternal Grandfather        Tobacco Use    Smoking status: Never Smoker    Smokeless tobacco: Never Used   Substance and Sexual Activity    Alcohol use: No     Comment: Has not had an alcoholic drink in more than 2 months.    Drug use: No    Sexual activity: Yes     Partners: Male     Birth control/protection: See Surgical Hx     Comment: current partner since Oct 2016     Review of Systems   Constitutional: Negative for activity change, appetite change, chills and fever.   HENT: Negative for congestion, ear discharge, ear pain, rhinorrhea, sinus pain, sore throat and tinnitus.    Eyes: Negative for discharge and redness.   Respiratory: Positive for cough and shortness of breath. Negative for chest tightness and wheezing.    Cardiovascular: Negative for chest pain, palpitations and leg swelling.   Gastrointestinal: Negative for abdominal distention, abdominal pain, constipation, diarrhea, nausea and vomiting.   Endocrine: Negative for polydipsia and polyuria.   Genitourinary: Negative for difficulty urinating, dysuria, frequency and urgency.   Musculoskeletal: Negative for arthralgias, myalgias, neck pain and neck stiffness.   Skin: Negative for pallor and rash.   Allergic/Immunologic: Positive for immunocompromised state.   Neurological: Negative for dizziness, weakness, light-headedness, numbness and headaches.   Hematological: Negative for adenopathy. Does not bruise/bleed easily.   Psychiatric/Behavioral:  Negative for confusion, decreased concentration and hallucinations.     Objective:     Vital Signs (Most Recent):  Temp: 98.8 °F (37.1 °C) (06/17/19 1747)  Pulse: 96 (06/17/19 1747)  Resp: 20 (06/17/19 1747)  BP: 126/83 (06/17/19 1747)  SpO2: 95 % (06/17/19 1747) Vital Signs (24h Range):  Temp:  [98.8 °F (37.1 °C)] 98.8 °F (37.1 °C)  Pulse:  [96] 96  Resp:  [20] 20  SpO2:  [95 %] 95 %  BP: (126)/(83) 126/83     Weight: 56.7 kg (125 lb)  Body mass index is 23.62 kg/m².    No intake or output data in the 24 hours ending 06/17/19 1820    Physical Exam   Constitutional: She is oriented to person, place, and time. She appears well-developed and well-nourished. No distress.   HENT:   Head: Normocephalic and atraumatic.   Mouth/Throat: Oropharynx is clear and moist. No oropharyngeal exudate.   Eyes: Pupils are equal, round, and reactive to light. Conjunctivae and EOM are normal. Right eye exhibits no discharge. Left eye exhibits no discharge. No scleral icterus.   Neck: Normal range of motion. Neck supple. No JVD present. No tracheal deviation present.   Cardiovascular: Normal rate, regular rhythm, normal heart sounds and intact distal pulses. Exam reveals no gallop and no friction rub.   No murmur heard.  Pulmonary/Chest: Effort normal. No stridor. No respiratory distress. She has decreased breath sounds in the right upper field, the right middle field, the right lower field, the left upper field, the left middle field and the left lower field. She has no wheezes. She has no rhonchi. She has no rales. She exhibits no tenderness.   Abdominal: Soft. Bowel sounds are normal. She exhibits no distension.   Musculoskeletal: Normal range of motion. She exhibits no edema.   Neurological: She is alert and oriented to person, place, and time.   Skin: Skin is warm and dry. She is not diaphoretic. No erythema.   Psychiatric: She has a normal mood and affect. Judgment normal.       Significant Labs:  CBC:  Recent Labs   Lab  06/17/19  1005   WBC 6.51   RBC 3.96*   HGB 9.5*   HCT 33.3*      MCV 84   MCH 24.0*   MCHC 28.5*     BMP:  Recent Labs   Lab 06/17/19  1005      K 3.9   CL 99   CO2 31*   BUN 16   CREATININE 0.9   CALCIUM 10.2        I have reviewed all pertinent labs within the past 24 hours.    Diagnostic Results:  CXR pending

## 2019-06-17 NOTE — ASSESSMENT & PLAN NOTE
Prepare for BLT.  Her most recent 6MWT was 850 feet, Frailty score 12 (not frail), BMI 24, and albumin 3.5.  She has a history of chronic pain and given the amount of opiates she takes at home, will consult anesthesia for assistance with pain management.

## 2019-06-17 NOTE — ASSESSMENT & PLAN NOTE
On Dapsone for PCP prophylaxis.  Will resume after surgery.  Recommend post operative Vanc/Merrem given previous microbiology data.

## 2019-06-17 NOTE — ASSESSMENT & PLAN NOTE
Previously on Tac, MMF, and Prednisone.  Will address these medications again following re-transplant.

## 2019-06-17 NOTE — ASSESSMENT & PLAN NOTE
Will need aggressive bowel regimen following surgery.  Will need to resume pancreatic enzymes once tolerating a diet.

## 2019-06-17 NOTE — H&P
[facility]  Lung Transplant  H&P    Patient Name: Juanita Ibarra  MRN: [id number]  Admission Date: 6/17/2019  Attending Physician: Francisca Morin DO  Primary Care Provider: Primary Doctor No     Subjective:     History of Present Illness:  Juanita Ibarra is on 2L of oxygen.  She is on no assisted ventilation.  Her New York Heart Association Class is 50% - Requires considerable assistance and frequent medical care.   She has been active on the lung transplant list since 5/15/2019 with an LAS of 36.8.  She is 5 years out from her first bilateral lung transplant for a diagnosis of CF and is now listed for re-transplant given her progressive end stage CARLOS EDUARDO.  She is not diabetic.  She was called in for a donor offer this afternoon for which she accepted.  Donor is not PHS increased risk.    She states that her dyspnea has continued to worsen over the last couple months.  A few weeks ago, she complained of worsening productive cough and just completed a 3 week course of Vanc/Merrem.  She states she still has an occasional productive cough of thick white sputum but denies any fever, chills, nightsweats, or sick contacts.      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 OR    BIOPSY-BRONCHUS N/A 12/20/2016    Performed by Jake Alvarez MD at [facility] OR 2ND FLR    BRONCHOSCOPY Bilateral  12/11/2018    Performed by Francisca Morin DO at [facility] OR 2ND FLR    BRONCHOSCOPY N/A 10/1/2018    Performed by Jake Alvarez MD at [facility] OR 2ND FLR    BRONCHOSCOPY Bilateral 12/20/2016    Performed by Jake Alvarez MD at [facility] OR 2ND FLR    BRONCHOSCOPY - flexible bronchoscopy with probable tissue biopsy CPT 53066 N/A 7/21/2015    Performed by Jake Alvarez MD at [facility] OR 2ND FLR    BRONCHOSCOPY - flexible bronchoscopy with probable tissue biospy CPT 22735 N/A 10/20/2015    Performed by Jake Alvarez MD at [facility] OR 2ND FLR    BRONCHOSCOPY - flexible bronchoscopy with tissue biopsy CPT 37381 N/A 9/29/2015    Performed by Jake Alvarez MD at [facility] OR 2ND FLR    BRONCHOSCOPY - flexible bronchoscopy with tissue biopsy CPT 51361 N/A 5/26/2015    Performed by Jake Alvarez MD at [facility] OR 1ST FLR    BRONCHOSCOPY flexible bronchoscopy with tissue biopsy N/A 9/8/2014    Performed by Jake Alvarez MD at [facility] OR 2ND FLR    BRONCHOSCOPY flexible with possible tissue biopsy N/A 8/8/2014    Performed by Jake Alvarez MD at [facility] OR 2ND FLR    BRONCHOSCOPY, BAL, BIOPSIES N/A 3/20/2018    Performed by Jake Alvarez MD at [facility] OR 2ND FLR    BRONCHOSCOPY-FIBEROPTIC N/A 1/29/2016    Performed by Joann Cifuentes DO at [facility] OR 2ND FLR    BRONCHOSCOPY; Bronchoscopy with BAL and transbronchial biopsies under general anesthesia N/A 5/29/2018    Performed by Jake Alvarez MD at [facility] OR 2ND FLR    CHOLECYSTECTOMY  2016    CHOLECYSTECTOMY-LAPAROSCOPIC N/A 7/22/2016    Performed by Joshua Goldberg, MD at [facility] OR 2ND FLR    COLONOSCOPY N/A 5/3/2019    Performed by Juancho Rich MD at [facility] ENDO (2ND FLR)    ENDOMETRIAL ABLATION  2015    KJB    ETHMOIDECTOMY Bilateral 4/9/2019    Performed by Adin Burks III, MD at [facility] OR 2ND FLR    FESS      FESS Bilateral 10/21/2013    Performed by Jared Whatley MD at [facility] OR 2ND FLR    FESS, USING COMPUTER-ASSISTED NAVIGATION  N/A 4/9/2019    Performed by Adin Burks III, MD at [facility] OR [address] [place] [address]    FINE NEEDLE ASPIRATION (FNA)  of a cervical lymphadenopathy  1/29/2016    Performed by Joann Cifuentes DO at [facility] OR [address] [place] [address]    flex bronchoscopy with probable tissue biopsy N/A 7/31/2014    Performed by [facility] at [facility] OR [address] [place] [address]    flexible bronchoscopy with tissue biopsy N/A 12/11/2014    Performed by Jake Alvarez MD at [facility] OR [address] [place] [address]    HYSTERECTOMY  04/2017    [facility]    INJECTION-VOCAL CORD Left 6/24/2014    Performed by Jared Whatley MD at [facility] OR [address] [place] [address]    [id number] to right chest and removal of portacath to left chests wall. Bilateral 6/24/2014    Performed by Zhen Dorado MD at [facility] OR [address] [place] [address]    LARYNX SURGERY  2016    LUNG TRANSPLANT Bilateral 6/12/14    MAXILLARY ANTROSTOMY  4/9/2019    Performed by Adin Burks III, MD at [facility] OR [address] [place] [address]    MYRINGOTOMY W/ TUBES Right 04/2017    MYRINGOTOMY WITH INSERTION OF PE TUBES Right 4/15/2017    Performed by Gary Null MD at [facility] OR [address] [place] [address]    PLACEMENT-TUBE-PEG  5/15/2014    Performed by David Moses MD at [facility] OR [address] [place] [address]    PORTACATH PLACEMENT Right     rt sc    REMOVAL-TUBE-PEG  5/15/2014    Performed by David Moses MD at [facility] OR [address] [place] [address]    RHC for lung re-transplant N/A 1/22/2019    Performed by Laura Rachel MD at [facility] CATH LAB    ROBOTIC ASSISTED LAPAROSCOPIC HYSTERECTOMY N/A 4/25/2017    Performed by Deny Dias Jr., MD at [facility] OR    SALPINGECTOMY Bilateral 2015    K[facility]    SALPINGECTOMY-LAPAROSCOPIC Bilateral 5/19/2015    Performed by Deny Dias Jr., MD at [facility] OR    SINUS SURGERY FUNCTIONAL ENDOSCOPIC WITH NAVIGATION Bilateral 4/3/2017    Performed by Cesar Olsen MD at [facility] OR [address] [place] [address]    SINUS SURGERY FUNCTIONAL ENDOSCOPIC WITH NAVIGATION, 86540, 01408, 68326, 34932 Bilateral 2/5/2015    Performed by Cesar Olsen MD at [facility] OR [address] [place] [address]    SPHENOIDECTOMY Bilateral  4/9/2019    Performed by Adin Burks III, MD at [facility] OR 2ND FLR    THYROPLASTY - Medialization laryngoplasty, cricothyroid subluxation, arytenoid repositioning Left 8/2/2016    Performed by Gary Null MD at [facility] OR 2ND FLR    TRANSPLANT-LUNG Bilateral 6/11/2014    Performed by Adolfo Huston MD at [facility] OR 2ND FLR       Review of patient's allergies indicates:   Allergen Reactions    Albuterol Palpitations    Colistin Anaphylaxis    Vancomycin analogues      Infusion reaction that does not resolve with slowing  Pt. States she can tolerate it when given with 50 mg Benadryl and ran over 3 hours    Neupogen [filgrastim] Other (See Comments)     Ostealgia after five daily doses of 300 mcg.      Bactrim [sulfamethoxazole-trimethoprim] Hives    Ceftazidime Hives     Pt stated can tolerate cefapine not ceftazidime    Ceftazidime     Dronabinol Other (See Comments)     Mental changes/hallucinations    Haldol [haloperidol lactate] Other (See Comments)     Seizure like activity    Nsaids (non-steroidal anti-inflammatory drug)      Cannot have due to lung transplant    Adhesive Rash     Cloth tape- please use tegaderm or paper tape    Aztreonam Rash    Ciprofloxacin Nausea And Vomiting     Projectile N/V, per patient.  Unwilling to retry therapy.       PTA Medications   Medication Sig    amLODIPine (NORVASC) 10 MG tablet Take 1 tablet (10 mg total) by mouth once daily.    butalbital-acetaminophen-caffeine -40 mg (FIORICET, ESGIC) -40 mg per tablet Take 1 tablet by mouth every 6 (six) hours as needed for Pain.    calcium-vitamin D3 (OS-KATY 500 + D3) 500 mg(1,250mg) -200 unit per tablet Take 1 tablet by mouth 2 (two) times daily with meals.    dapsone 100 MG Tab Take 1 tablet (100 mg total) by mouth once daily.    diphenhydrAMINE (BENADRYL) 50 MG tablet Take 1 tablet (50 mg total) by mouth every 6 (six) hours as needed for Itching.    fexofenadine (ALLEGRA) 180 MG tablet Take 180 mg  by mouth once daily.    fluconazole (DIFLUCAN) 150 MG Tab TAKE 1 TABLET BY MOUTH EVERY WEEK    fluticasone propionate (FLONASE) 50 mcg/actuation nasal spray INSERT 2 SPRAYS IN EACH NOSTRIL DAILY    folic acid (FOLVITE) 1 MG tablet Take 1 tablet (1,000 mcg total) by mouth once daily.    gabapentin (NEURONTIN) 300 MG capsule Take 1 capsule (300 mg total) by mouth 3 (three) times daily.    inhalation spacing device (PROCHAMBER) USE AS DIRECTED    levalbuterol (XOPENEX HFA) 45 mcg/actuation inhaler Inhale 1-2 puffs into the lungs.    levalbuterol (XOPENEX) 1.25 mg/3 mL nebulizer solution Take 3 mLs (1.25 mg total) by nebulization every 8 (eight) hours as needed for Wheezing or Shortness of Breath. Rescue    lipase-protease-amylase (CREON) 36,000-114,000- 180,000 unit CpDR Take 4 capsules by mouth 3 (three) times daily with meals. Take 3 with snacks as needed. (Patient taking differently: Take 5 capsules by mouth 3 (three) times daily with meals. )    LORazepam (ATIVAN) 2 MG Tab Take 1 tablet by mouth every 8 hours as needed for ANXIETY    magnesium oxide (MAG-OX) 400 mg (241.3 mg magnesium) tablet Take 1 tablet (400 mg total) by mouth 2 (two) times daily.    metoprolol tartrate (LOPRESSOR) 25 MG tablet Take 1 tablet (25 mg total) by mouth 2 (two) times daily.    mometasone-formoterol (DULERA) 100-5 mcg/actuation HFAA Inhale 1 puff into the lungs 2 (two) times daily. Controller    montelukast (SINGULAIR) 10 mg tablet Take 1 tablet (10 mg total) by mouth once daily.    morphine (MS CONTIN) 15 MG 12 hr tablet Take 1 tablet by mouth twice a day    multivit,min52-folic-vitK-cQ10 (AQUADEKS) 100-700-10 mcg-mcg-mg Cap cap 1 tab twice daily    multivitamin-minerals no.55 (CENTRUM FLAVOR BURST ADULT) Chew     mycophenolate (CELLCEPT) 250 mg Cap Take 2 capsules (500 mg total) by mouth 2 (two) times daily.    omeprazole (PRILOSEC) 40 MG capsule Take 40 mg by mouth every morning.    ondansetron (ZOFRAN) 8 MG  tablet Take 1 tablet (8 mg total) by mouth every 12 (twelve) hours as needed for Nausea.    oxycodone (ROXICODONE) 5 MG immediate release tablet Take 10 mg by mouth every 4 (four) hours as needed for Pain.    oxyCODONE (ROXICODONE) 5 MG immediate release tablet take 1 tablet by mouth twice a day    polyethylene glycol (GLYCOLAX) 17 gram PwPk Take 17 g by mouth 2 (two) times daily.    predniSONE (DELTASONE) 5 MG tablet Take 1 tablet (5 mg total) by mouth once daily.    promethazine (PHENERGAN) 25 MG tablet Take 1 tablet (25 mg total) by mouth every 6 (six) hours as needed for Nausea.    QUEtiapine (SEROQUEL) 25 MG Tab Take 1 tablet by mouth in morning and 2 tablets at bedtime    sodium chloride 3% 3 % nebulizer solution Take 4 mLs by nebulization once daily.    tacrolimus (PROGRAF) 0.5 MG Cap Take 1 capsule (0.5 mg total) by mouth every 12 (twelve) hours.    tacrolimus (PROGRAF) 1 MG Cap Take 2 capsules (2 mg total) by mouth every 12 (twelve) hours. Daily doses: 2.5 mg twice daily.    tiZANidine (ZANAFLEX) 4 MG tablet TAKE 2 TABLETS BY MOUTH EVERY 6 HOURS AS NEEDED    tobramycin with nebulizer 300 mg/5 mL Nebu Take 300 mg by nebulization every 12 (twelve) hours.    topiramate (TOPAMAX) 25 MG tablet Take 1 tablet (25 mg total) by mouth 2 (two) times daily    venlafaxine (EFFEXOR-XR) 150 MG Cp24 Take 1 capsule (150 mg total) by mouth once daily.    venlafaxine (EFFEXOR-XR) 37.5 MG 24 hr capsule Take 1 capsule (37.5 mg total) by mouth once daily.     Family History     Problem Relation (Age of Onset)    Cancer Maternal Grandmother    Diabetes Maternal Grandfather    Drug abuse Maternal Grandfather    Hypertension Maternal Grandmother    Thrombocytopenia Maternal Grandfather        Tobacco Use    Smoking status: Never Smoker    Smokeless tobacco: Never Used   Substance and Sexual Activity    Alcohol use: No     Comment: Has not had an alcoholic drink in more than 2 months.    Drug use: No    Sexual  activity: Yes     Partners: Male     Birth control/protection: See Surgical Hx     Comment: current partner since Oct 2016     Review of Systems   Constitutional: Negative for activity change, appetite change, chills and fever.   HENT: Negative for congestion, ear discharge, ear pain, rhinorrhea, sinus pain, sore throat and tinnitus.    Eyes: Negative for discharge and redness.   Respiratory: Positive for cough and shortness of breath. Negative for chest tightness and wheezing.    Cardiovascular: Negative for chest pain, palpitations and leg swelling.   Gastrointestinal: Negative for abdominal distention, abdominal pain, constipation, diarrhea, nausea and vomiting.   Endocrine: Negative for polydipsia and polyuria.   Genitourinary: Negative for difficulty urinating, dysuria, frequency and urgency.   Musculoskeletal: Negative for arthralgias, myalgias, neck pain and neck stiffness.   Skin: Negative for pallor and rash.   Allergic/Immunologic: Positive for immunocompromised state.   Neurological: Negative for dizziness, weakness, light-headedness, numbness and headaches.   Hematological: Negative for adenopathy. Does not bruise/bleed easily.   Psychiatric/Behavioral: Negative for confusion, decreased concentration and hallucinations.     Objective:     Vital Signs (Most Recent):  Temp: 98.8 °F (37.1 °C) (06/17/19 1747)  Pulse: 96 (06/17/19 1747)  Resp: 20 (06/17/19 1747)  BP: 126/83 (06/17/19 1747)  SpO2: 95 % (06/17/19 1747) Vital Signs (24h Range):  Temp:  [98.8 °F (37.1 °C)] 98.8 °F (37.1 °C)  Pulse:  [96] 96  Resp:  [20] 20  SpO2:  [95 %] 95 %  BP: (126)/(83) 126/83     Weight: 56.7 kg (125 lb)  Body mass index is 23.62 kg/m².    No intake or output data in the 24 hours ending 06/17/19 1820    Physical Exam   Constitutional: She is oriented to person, place, and time. She appears well-developed and well-nourished. No distress.   HENT:   Head: Normocephalic and atraumatic.   Mouth/Throat: Oropharynx is clear and  moist. No oropharyngeal exudate.   Eyes: Pupils are equal, round, and reactive to light. Conjunctivae and EOM are normal. Right eye exhibits no discharge. Left eye exhibits no discharge. No scleral icterus.   Neck: Normal range of motion. Neck supple. No JVD present. No tracheal deviation present.   Cardiovascular: Normal rate, regular rhythm, normal heart sounds and intact distal pulses. Exam reveals no gallop and no friction rub.   No murmur heard.  Pulmonary/Chest: Effort normal. No stridor. No respiratory distress. She has decreased breath sounds in the right upper field, the right middle field, the right lower field, the left upper field, the left middle field and the left lower field. She has no wheezes. She has no rhonchi. She has no rales. She exhibits no tenderness.   Abdominal: Soft. Bowel sounds are normal. She exhibits no distension.   Musculoskeletal: Normal range of motion. She exhibits no edema.   Neurological: She is alert and oriented to person, place, and time.   Skin: Skin is warm and dry. She is not diaphoretic. No erythema.   Psychiatric: She has a normal mood and affect. Judgment normal.       Significant Labs:  CBC:  Recent Labs   Lab 06/17/19  1005   WBC 6.51   RBC 3.96*   HGB 9.5*   HCT 33.3*      MCV 84   MCH 24.0*   MCHC 28.5*     BMP:  Recent Labs   Lab 06/17/19  1005      K 3.9   CL 99   CO2 31*   BUN 16   CREATININE 0.9   CALCIUM 10.2        I have reviewed all pertinent labs within the past 24 hours.    Diagnostic Results:  CXR pending    Assessment/Plan:     * Bronchiolitis obliterans syndrome  Juanita Ibarra is on 2L of oxygen.  She is on no assisted ventilation.  Her New York Heart Association Class is 50% - Requires considerable assistance and frequent medical care.  She has been active on the lung transplant list since 5/15/2019 with an LAS of 36.8.  She is 5 years out from her first bilateral lung transplant for a diagnosis of CF and is now listed for  re-transplant given her progressive end stage CARLOS EDUARDO.  She is not diabetic.      She was called in for a donor offer this afternoon for which she accepted.  Donor is not PHS increased risk.  All questions were answered and we will prepare for BLT.       Awaiting transplantation of lung  Prepare for BLT.  Her most recent 6MWT was 850 feet, Frailty score 12 (not frail), BMI 24, and albumin 3.5.  She has a history of chronic pain and given the amount of opiates she takes at home, will consult anesthesia for assistance with pain management.       Immunosuppression  Previously on Tac, MMF, and Prednisone.  Will address these medications again following re-transplant.    Prophylactic antibiotic  On Dapsone for PCP prophylaxis.  Will resume after surgery.  Recommend post operative Vanc/Merrem given previous microbiology data.      CF related Pancreatic insufficiency  Will need aggressive bowel regimen following surgery.  Will need to resume pancreatic enzymes once tolerating a diet.        Francisca Morin, DO  Lung Transplant  [facility]

## 2019-06-18 PROBLEM — Z76.82 AWAITING ORGAN TRANSPLANT: Status: RESOLVED | Noted: 2019-01-01 | Resolved: 2019-01-01

## 2019-06-18 PROBLEM — Z29.89 PROPHYLACTIC IMMUNOTHERAPY: Status: ACTIVE | Noted: 2019-01-01

## 2019-06-18 PROBLEM — D62 ACUTE BLOOD LOSS ANEMIA: Status: ACTIVE | Noted: 2019-01-01

## 2019-06-18 NOTE — ASSESSMENT & PLAN NOTE
BG goal 140 - 180. Discontinue CTS given transplant.     Continue IV insulin infusion protocol  Requires intensive BG monitoring while on protocol

## 2019-06-18 NOTE — ANESTHESIA PROCEDURE NOTES
Arterial    Diagnosis: lung transplant    Patient location during procedure: done in OR  Procedure start time: 6/18/2019 5:01 AM  Timeout: 6/18/2019 5:00 AM  Procedure end time: 6/18/2019 5:05 AM  Staffing  Anesthesiologist: Cande Mclaughlin MD  Resident/CRNA: Lv Matson MD  Performed: anesthesiologist and resident/CRNA   Anesthesiologist was present at the time of the procedure.  Preanesthetic Checklist  Completed: patient identified, site marked, surgical consent, pre-op evaluation, timeout performed, IV checked, risks and benefits discussed, monitors and equipment checked and anesthesia consent givenArterial  Skin Prep: chlorhexidine gluconate  Local Infiltration: lidocaine  Orientation: left  Location: brachial  Catheter Size: 20 G  Catheter placement by Ultrasound guidance. Heme positive aspiration all ports.  Vessel Caliber: medium, patent, compressibility normal  Needle advanced into vessel with real time Ultrasound guidance.  Guidewire confirmed in vessel.  Sterile sheath used.  Image recorded and saved.Insertion Attempts: 1  Assessment  Dressing: secured with tape and tegaderm  Patient: Tolerated well

## 2019-06-18 NOTE — PROGRESS NOTES
[facility]  Lung Transplant  Progress Note - Critical Care    Patient Name: Juanita Ibarra  MRN: [id number]  Admission Date: 6/17/2019  Hospital Length of Stay: 0 days  Post-Operative Day: 1832 (Lung), 0 (Lung)  Attending Physician: Jonathan Newby MD  Primary Care Provider: Primary Doctor No     Subjective:     Interval History: POD#0 from BLT (re-transplant) on CPB.     Patient arrived to ICU this afternoon around 1500. Intra-operatively, she received 5u PRBCs, 1u plts, and 4u FFP. Good initial graft function leaving the OR and upon arrival to ICU. PGD0 currently.  ABG upon arrival 7/37/37.6/262/21.8 on PC PEEP 8, Rate 20, FIO2 40%, Pi 20, Ti 0.8. Lactic 2.5 upon arrival.    Patient arrived not requiring any vasopressor support and was started on nicardipine upon arrival to SICU.     Dilaudid 1mg x2 given this afternoon. Pain management/anesthesia to follow. Will need aggressive bowel regimen moving forward.    Continuous Infusions:   dextrose 5 % and 0.45 % NaCl with KCl 20 mEq      epinephrine infusion      fentanyl      insulin (HUMAN R) infusion (adults)      nicardipine      propofol       Scheduled Meds:   ampicillin-sulbactim (UNASYN) IVPB  1.5 g Intravenous Q6H    aspirin  325 mg Per NG tube Once    aspirin  325 mg Oral Daily    basiliximab (SIMULECT) chemo infusion  20 mg Intravenous Q96H    chlorhexidine  10 mL Mouth/Throat BID    famotidine (PF)  20 mg Intravenous BID    fluconazole  400 mg Per NG tube Daily    ganciclovir (CYTOVENE) IVPB  5 mg/kg Intravenous Q12H    hydromorphone (PF)        HYDROmorphone  1 mg Intravenous Once    meropenem (MERREM) IVPB  1 g Intravenous Q8H    [START ON 6/19/2019] methylPREDNISolone (SOLU-Medrol) IVPB (doses > 250 mg)   Intravenous Daily    Followed by    [START ON 6/20/2019] methylPREDNISolone (SOLU-Medrol) IVPB (doses > 250 mg)   Intravenous Daily    Followed by    [START ON 6/21/2019] methylPREDNISolone sodium  succinate  2 mg/kg Intravenous Daily    mupirocin  1 g Nasal BID    [START ON 6/20/2019] mycophenolate mofetil  500 mg Per NG tube BID    niCARdipine        polyethylene glycol  17 g Oral Daily    [START ON 6/19/2019] sulfamethoxazole-trimethoprim 200-40 mg/5 ml  20 mL Per NG tube Every Mon, Wed, Fri    [START ON 6/20/2019] tacrolimus  0.5 mg Per NG tube BID    vancomycin (VANCOCIN) IVPB  20 mg/kg Intravenous Once     PRN Meds:acetaminophen, dextrose 50%, dextrose 50%, diphenhydrAMINE, HYDROmorphone, lactulose, levalbuterol, magnesium sulfate IVPB, metoclopramide HCl, ondansetron, oxyCODONE, oxyCODONE, potassium chloride in water **AND** potassium chloride in water **AND** potassium chloride in water, sodium phosphate IVPB, sodium phosphate IVPB, sodium phosphate IVPB    Review of patient's allergies indicates:   Allergen Reactions    Albuterol Palpitations    Colistin Anaphylaxis    Vancomycin analogues      Infusion reaction that does not resolve with slowing  Pt. States she can tolerate it when given with 50 mg Benadryl and ran over 3 hours    Neupogen [filgrastim] Other (See Comments)     Ostealgia after five daily doses of 300 mcg.      Bactrim [sulfamethoxazole-trimethoprim] Hives    Ceftazidime Hives     Pt stated can tolerate cefapine not ceftazidime    Ceftazidime     Dronabinol Other (See Comments)     Mental changes/hallucinations    Haldol [haloperidol lactate] Other (See Comments)     Seizure like activity    Nsaids (non-steroidal anti-inflammatory drug)      Cannot have due to lung transplant    Adhesive Rash     Cloth tape- please use tegaderm or paper tape    Aztreonam Rash    Ciprofloxacin Nausea And Vomiting     Projectile N/V, per patient.  Unwilling to retry therapy.       Review of Systems   Unable to perform ROS: Intubated     Objective:   Physical Exam   Constitutional: She appears well-developed and well-nourished. No distress. She is sedated and intubated.   HENT:   Head:  Normocephalic and atraumatic.   Right Ear: External ear normal.   Left Ear: External ear normal.   ETT/OGT in place   Eyes: Pupils are equal, round, and reactive to light. Conjunctivae and EOM are normal.   Neck: Neck supple. No JVD present.   Right IJ CVC with dressing c/d/i; right subclavian port with dressing c/d/i   Cardiovascular: Normal rate, regular rhythm and normal heart sounds.   Pulmonary/Chest: She is intubated. She has decreased breath sounds in the right lower field and the left lower field. She has no wheezes. She has no rhonchi. She has no rales.   Mechanical breath sounds  Bilateral pleural and mediastinal chest tubes with serosanguinous output   Abdominal: Soft. Bowel sounds are normal. She exhibits distension. There is no tenderness.   Obese abdomen   Genitourinary:   Genitourinary Comments: Goldberg to gravity   Musculoskeletal:   Sedated   Neurological:   Intubated and sedated   Skin: Skin is warm and dry. She is not diaphoretic. There is pallor.   Psychiatric:   Intubated and sedated   Nursing note and vitals reviewed.        Vital Signs (Most Recent):  Temp: 98 °F (36.7 °C) (06/18/19 1500)  Pulse: 88 (06/18/19 1500)  Resp: 20 (06/18/19 1500)  BP: 115/63 (06/18/19 1500)  SpO2: 100 % (06/18/19 1500) Vital Signs (24h Range):  Temp:  [98 °F (36.7 °C)-98.8 °F (37.1 °C)] 98 °F (36.7 °C)  Pulse:  [81-96] 88  Resp:  [9-20] 20  SpO2:  [95 %-100 %] 100 %  BP: (115-127)/(63-85) 115/63  Arterial Line BP: (126)/(75) 126/75     Weight: 59 kg (129 lb 15.4 oz)  Body mass index is 24.56 kg/m².      Intake/Output Summary (Last 24 hours) at 6/18/2019 1556  Last data filed at 6/18/2019 1329  Gross per 24 hour   Intake 3994 ml   Output 575 ml   Net 3419 ml       Ventilator Data:     Vent Mode: A/C  Oxygen Concentration (%):  [40] 40  Resp Rate Total:  [26 br/min-28 br/min] 28 br/min  PEEP/CPAP:  [8 cmH20] 8 cmH20  Mean Airway Pressure:  [15 cmH20] 15 cmH20    Hemodynamic Parameters:  CO:  [5.6 L/min] 5.6 L/min  CI:   [3.6 L/min/m2] 3.6 L/min/m2    Lines/Drains:       Port A Cath Single Lumen 06/24/14 1200 right subclavian (Active)   Accessed by: Radha Ortega RN 6/17/2019  8:00 PM   Dressing Type Transparent 6/17/2019  8:00 PM   Dressing Status Clean;Dry;Intact;Biopatch in place 6/17/2019  8:00 PM   Dressing Intervention Dressing reinforced 6/17/2019  8:00 PM   Dressing Change Due 06/24/19 6/17/2019  8:00 PM   Line Status No blood return;Saline locked;Flushed 6/17/2019  8:00 PM   Flush Performed Yes 6/17/2019  8:00 PM   Date to be Reflushed 06/18/19 6/17/2019  8:00 PM   Daily Line Review Performed 6/17/2019  8:00 PM   Type of Needle Nicole 6/17/2019  8:00 PM   Gauge 20 6/17/2019  8:00 PM   Needle Length 1 in 6/17/2019  8:00 PM   Needle Status Left in place 6/17/2019  8:00 PM   Needle Insertion Date 06/17/19 6/17/2019  8:00 PM   Needle Insertion Time 1900 6/17/2019  8:00 PM   Number of days: 1820            Percutaneous Central Line Insertion/Assessment - double lumen  06/18/19 0510 (Active)   Number of days: 0            Arterial Line 06/18/19 0501 Left Other (Comment) (Active)   Number of days: 0            Urethral Catheter 06/18/19 0504 Non-latex;Straight-tip;Temperature probe 14 Fr. (Active)   Number of days: 0            Y Chest Tube 1 and 2 06/18/19 1151 1 Right Mediastinal 19 Fr. 2 Left Mediastinal 19 Fr. (Active)   Number of days: 0            Y Chest Tube 3 and 4 06/18/19 1151 3 Right Pleural 19 Fr. 4 Left Pleural 19 Fr. (Active)   Number of days: 0       Significant Labs:  CBC:  Recent Labs   Lab 06/18/19  1411 06/18/19  1447   WBC 9.10  --    RBC 3.68*  --    HGB 10.1*  --    HCT 30.6* 28*   PLT 70*  --    MCV 83  --    MCH 27.4  --    MCHC 33.0  --      BMP:  Recent Labs   Lab 06/18/19  1411      K 4.0  4.0  4.0      CO2 19*   BUN 22*   CREATININE 0.9   CALCIUM 8.8      Tacrolimus Levels:  No results for input(s): TACROLIMUS in the last 72 hours.  Microbiology:  Microbiology Results (last 7 days)      Procedure Component Value Units Date/Time    Gram stain [[id number]] Collected:  06/18/19 0930    Order Status:  Completed Specimen:  Tissue from Lung, Left Updated:  06/18/19 1041     Gram Stain Result Moderate WBC's      No organisms seen    Narrative:       Donor lung    Aerobic culture [[id number]] Collected:  06/18/19 0930    Order Status:  Sent Specimen:  Tissue from Lung, Left Updated:  06/18/19 0946    Culture, Anaerobe [[id number]] Collected:  06/18/19 0930    Order Status:  Sent Specimen:  Tissue from Lung, Left Updated:  06/18/19 0945    AFB Culture & Smear [[id number]] Collected:  06/18/19 0930    Order Status:  Sent Specimen:  Tissue from Lung, Left Updated:  06/18/19 0945    Fungus culture [[id number]] Collected:  06/18/19 0930    Order Status:  Sent Specimen:  Tissue from Lung, Left Updated:  06/18/19 0944    Urine culture [[id number]] Collected:  06/17/19 1753    Order Status:  Sent Specimen:  Urine, Clean Catch Updated:  06/17/19 2012          I have reviewed all pertinent labs within the past 24 hours.    Diagnostic Results:  Chest X-Ray: I personally reviewed the films and findings are:     X-Ray: small right apical PTX        Assessment/Plan:     * S/P lung transplant  POD#0 from bilateral lung transplant (re-transplant) secondary to CARLOS EDUARDO. Initial transplant on 6/12/2014 secondary to CF.     Good initial graft function upon arrival to ICU. PGD0 currently.     Continue with current lung protective ventilation. Current settings: AC/PC with rate 20, Pi20, Ti 0.8, PEEP 8%, FiO2 40%. Lung transplant to manage vent settings. CTS currently primary for ICU and chest tube management. Will plan for induction immunosuppression and prophylaxis per protocol.     Immunosuppression  Previously on tacrolimus, MMF, and daily prednisone. Will plan for basiliximab today and repeat dose on POD#4. Plan to begin immunosuppression on POD#2 with tacrolimus and MMF. Continue steroid taper per protocol.     Prophylactic  antibiotic  CMV D-/R+. Previously on Dapsone for PCP prophylaxis.  Will plan to resume dapsone tomorrow.  Continue post-operative Vanc/Merrem given previous microbiology data. ID following, appreciate recs.  Continue OIP with fluconazole and ganciclovir.    Acute blood loss anemia  Received 5u PRBCs, 1u plts, and 4 FFP intra-operatively. H/H stable early this afternoon. Repeat CBC this afternoon per CTS. Transfusion protocol per CTS. Continue to monitor CT output. Would recommend being conservative with blood transfusions to prevent antibody accumulation.    Thrombocytopenia, unspecified  Expected post-operatively. Continue to monitor on serial CBC.    Adrenal cortical steroids causing adverse effect in therapeutic use  Endocrinology consulted, appreciate recs.    CF related Pancreatic insufficiency  Will need aggressive bowel regimen following surgery.  Will need to resume pancreatic enzymes once tolerating a diet.    Chronic pain with opiate use  Recommend anesthesia pain management consult. Will need aggressive bowel regimen.         Preventive Measures: Per primary CTS team    Counseling/Consultation:I discussed the case with Dr. Morin., I discussed the patient's condition/prognosis with the family.      Jacqueline Dumont PA-C  Lung Transplant  [facility]

## 2019-06-18 NOTE — ASSESSMENT & PLAN NOTE
Received 5u PRBCs, 1u plts, and 4 FFP intra-operatively. H/H stable early this afternoon. Repeat CBC this afternoon per CTS. Transfusion protocol per CTS. Continue to monitor CT output. Would recommend being conservative with blood transfusions to prevent antibody accumulation.

## 2019-06-18 NOTE — ASSESSMENT & PLAN NOTE
POD#0 from bilateral lung transplant (re-transplant) secondary to CARLOS EDUARDO. Initial transplant on 6/12/2014 secondary to CF.     Good initial graft function upon arrival to ICU. PGD0 currently.     Continue with current lung protective ventilation. Current settings: AC/PC with rate 20, Pi20, Ti 0.8, PEEP 8%, FiO2 40%. Lung transplant to manage vent settings. CTS currently primary for ICU and chest tube management. Will plan for induction immunosuppression and prophylaxis per protocol.

## 2019-06-18 NOTE — CONSULTS
[facility]  Endocrinology  Diabetes Consult Note    Consult Requested by: Jonathan Newby MD   Reason for admit: S/P lung transplant    HISTORY OF PRESENT ILLNESS:  Reason for Consult: Management of  Hyperglycemia and CF related pancreatic insufficiency.     Surgical Procedure and Date: lung transplant in 2014; 6/18/19    HPI:   Patient is a 29 y.o. female with a diagnosis of CF, HTN, migraine headache, and osteopenia. 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 No personal history of DM. Endocrinology consulted for BG management.     Lab Results   Component Value Date    HGBA1C 4.7 01/09/2019                 Interval HPI:   Received in ICU. Remains intubated. Bg above goal; started on IV insulin infusion rates 1-1.6 u/hr.   Eating:   NPO  Nausea: No  Hypoglycemia and intervention: No  Fever: No  TPN and/or TF: No    PMH, PSH, FH, SH reviewed       Review of Systems  Unable to obtain due to: Sedation,Intubation,Altered mental status,Critical illness,Reviewed ROS from note dated 6/17/19 per Dr. Morin.    Current Medications and/or Treatments Impacting Glycemic Control  Immunotherapy:    Immunosuppressants         Stop Route Frequency     mycophenolate mofetil 200 mg/mL suspension 500 mg      -- PER NG TUBE 2 times daily     tacrolimus (PROGRAF) 1 mg/mL oral syringe      -- PER NG TUBE 2 times daily     basiliximab (SIMULECT) 20 mg in dextrose 5 % 50 mL chemo infusion      06/26 1514 IV Every 96 hours        Steroids:   Hormones (From admission, onward)    Start     Stop Route Frequency Ordered    06/21/19 0900  methylPREDNISolone sodium succinate injection 118 mg  (methylprednisolone panel)      06/22 0859 IV Daily 06/18/19 1411    06/20/19 0900  methylPREDNISolone sodium succinate (SOLU-MEDROL) 177 mg in dextrose 5 % 100 mL IVPB  (methylprednisolone panel)      06/21 0859 IV Daily 06/18/19 1411    06/19/19 0900  methylPREDNISolone sodium  succinate (SOLU-MEDROL) 295 mg in dextrose 5 % 100 mL IVPB  (methylprednisolone panel)      06/20 0859 IV Daily 06/18/19 1411        Pressors:    Autonomic Drugs (From admission, onward)    Start     Stop Route Frequency Ordered    06/18/19 1515  EPINEPHrine (ADRENALIN) 5 mg in sodium chloride 0.9% 250 mL infusion     Question Answer Comment   Titrate by: (in mcg/kg/min) 0.05    Titrate interval: (in minutes) 5    Titrate to maintain: (SBP or MAP or Cardiac Index) MAP    Greater than: (in mmHg) 60    Cardiac index greater than: (in L/min) 2.2    Maximum dose: (in mcg/kg/min) 2        -- IV Continuous 06/18/19 1411        Hyperglycemia/Diabetes Medications:   Antihyperglycemics (From admission, onward)    Start     Stop Route Frequency Ordered    06/18/19 1515  insulin regular (Humulin R) 100 Units in sodium chloride 0.9% 100 mL infusion     Question:  Insulin rate changes (DO NOT MODIFY ANSWER)  Answer:  \\[email or web address] [id number]    -- IV Continuous 06/18/19 1411             PHYSICAL EXAMINATION:  Vitals:    06/17/19 2000   BP: 127/85   Pulse: 82   Resp: 20   Temp: 98.1 °F (36.7 °C)     Body mass index is 24.56 kg/m².    Physical Exam   Constitutional: Well developed, well nourished, NAD.  ENT: External ears no masses with nose patent; normal hearing.   Neck: Supple; trachea midline.   Cardiovascular: Normal heart sounds, no LE edema. DP +2 bilaterally.  Lungs: Intubated on ventilator; lungs anterior bilaterally clear to auscultation.  Abdomen: Soft, no masses, no hernias.  MS: No clubbing or cyanosis of nails noted;  unable to assess gait.  Skin: No rashes, lesions, or ulcers; no nodules. Transverse thoracic incision closed with dressing; chest tubes.   Psychiatric: DAVE  Neurological:DAVE  Foot: nails in good condition, no amputations noted.          Labs Reviewed and Include   Recent Labs   Lab 06/18/19  1411   *   CALCIUM 8.8      K 4.0  4.0  4.0    CO2 19*      BUN 22*   CREATININE 0.9     Lab Results   Component Value Date    WBC 9.10 06/18/2019    HGB 10.1 (L) 06/18/2019    HCT 30 (L) 06/18/2019    MCV 83 06/18/2019    PLT 70 (L) 06/18/2019     No results for input(s): TSH, FREET4 in the last 168 hours.  Lab Results   Component Value Date    HGBA1C 4.7 01/09/2019       Nutritional status:   Body mass index is 24.56 kg/m².  Lab Results   Component Value Date    ALBUMIN 3.5 06/17/2019    ALBUMIN 3.2 (L) 06/10/2019    ALBUMIN 3.3 (L) 06/06/2019     Lab Results   Component Value Date    PREALBUMIN 15 (L) 05/29/2014    PREALBUMIN 11 (L) 05/09/2014    PREALBUMIN 18 (L) 03/19/2014       Estimated Creatinine Clearance: 75.5 mL/min (based on SCr of 0.9 mg/dL).    Accu-Checks  Recent Labs     06/18/19  1444 06/18/19  1600   POCTGLUCOSE 177* 234*        ASSESSMENT and PLAN    * S/P lung transplant  Managed per LUT.   avoid hypoglycemia        Hyperglycemia  BG goal 140 - 180. Discontinue CTS given transplant.     Continue IV insulin infusion protocol  Requires intensive BG monitoring while on protocol         Adrenal cortical steroids causing adverse effect in therapeutic use  Glucocorticoids markedly increase  glucoses. Expect the steroid taper will help glucose control.         Prophylactic immunotherapy  May increase insulin resistance.       CF related Pancreatic insufficiency  May impact BG.           Plan discussed with family and RN at bedside.     Amy Zapata NP  Endocrinology  [facility]

## 2019-06-18 NOTE — PROGRESS NOTES
Patient departed to OR via wheelchair with Anesthesiology at 0435. Paper chart with consents/labels, IV vancomycin, and IV Benadryl with Anesthesia. Family members departed to surgical center waiting area.

## 2019-06-18 NOTE — ANESTHESIA PREPROCEDURE EVALUATION
Pre-operative evaluation for TRANSPLANT, LUNG: bilateral (Bilateral)    Case Discussion:  Juanita Ibarra is a 29 y.o. female with CF s/p BL Lung Transplant June 2014 complicated by bronchiolitis obliterans and left vocal chord paralysis s/p L arytenopexy/laryngoplasty(implant) and cricoid subluxation. In addition has HTN, Anxiety. Does not have DM.     LDA:  - R Subclavian Port    Previous Airway:  - DL Mac 3, Gr. I View. 6.0 ETT (Post-Laryngoplasty)  - DL Will II, Gr. II View (small mouth, anterior), 7.0 ETT (Pre-Laryngoplasty)    Past Surgical History:   Procedure Laterality Date    ABDOMINAL SURGERY      peg tube     [id number] N/A 5/19/2015    Performed by Deny Dias Jr., [facility] at [facility] OR    BIOPSY-BRONCHUS N/A 12/20/2016    Performed by Jake Alvarez MD at [facility] OR 2ND FLR    BRONCHOSCOPY Bilateral 12/11/2018    Performed by Francisca Morin DO at [facility] OR 2ND FLR    BRONCHOSCOPY N/A 10/1/2018    Performed by Jake Alvarez MD at [facility] OR 2ND FLR    BRONCHOSCOPY Bilateral 12/20/2016    Performed by Jake Alvarez MD at [facility] OR 2ND FLR    BRONCHOSCOPY - flexible bronchoscopy with probable tissue biopsy CPT 54279 N/A 7/21/2015    Performed by Jake Alvarez MD at [facility] OR 2ND FLR    BRONCHOSCOPY - flexible bronchoscopy with probable tissue biospy CPT 14202 N/A 10/20/2015    Performed by Jake Alvarez MD at [facility] OR 2ND FLR    BRONCHOSCOPY - flexible bronchoscopy with tissue biopsy CPT 65723 N/A 9/29/2015    Performed by Jake Alvarez MD at [facility] OR 2ND FLR    BRONCHOSCOPY - flexible bronchoscopy with tissue biopsy CPT 44701 N/A 5/26/2015    Performed by Jake Alvarez MD at [facility] OR 1ST FLR    BRONCHOSCOPY flexible bronchoscopy with tissue biopsy N/A 9/8/2014    Performed by Jake Alvarez MD at [facility] OR 2ND FLR    BRONCHOSCOPY flexible with possible tissue biopsy N/A 8/8/2014    Performed by Jake Alvarez MD at [facility] OR 2ND FLR     BRONCHOSCOPY, BAL, BIOPSIES N/A 3/20/2018    [facility]    BRONCHOSCOPY-FIBEROPTIC N/A 1/29/2016    Performed by Joann Cifuentes DO at [facility] OR [address] [place] [address]    BRONCHOSCOPY; Bronchoscopy with BAL and transbronchial biopsies under general anesthesia N/A 5/29/2018    [facility]    CHOLECYSTECTOMY  2016    CHOLECYSTECTOMY-LAPAROSCOPIC N/A 7/22/2016    [facility]    COLONOSCOPY N/A 5/3/2019    Performed by Juancho Rich MD at [facility] ENDO (2ND FLR)    ENDOMETRIAL ABLATION  2015    KJB    ETHMOIDECTOMY Bilateral 4/9/2019    Performed by [facility]in [facility]    FESS      FESS Bilateral 10/21/2013    Performed by Jared Whatley MD at [facility] OR [address] [place] [address]    FESS, USING COMPUTER-ASSISTED NAVIGATION N/A 4/9/2019    Performed by Adin Burks III, MD at [facility] OR [address] [place] [address]    FINE NEEDLE ASPIRATION (FNA)  of a cervical lymphadenopathy  1/29/2016    Performed by Joann Cifuentes DO at [facility] OR [address] [place] [address]    flex bronchoscopy with probable tissue biopsy N/A 7/31/2014    Performed by [employer] [facility] Provider at [facility] OR [address] [place] [address]    flexible bronchoscopy with tissue biopsy N/A 12/11/2014    [facility]    HYSTERECTOMY  04/2017    [facility]    INJECTION-VOCAL CORD Left 6/24/2014    Performed by Jared Whatley MD at [facility] OR [address] [place] [address]    [id number]-CATH to right chest and removal of portacath to left chests wall. Bilateral 6/24/2014    Performed by Zhen Dorado MD at [facility] OR [address] [place] [address]    LARYNX SURGERY  2016    LUNG TRANSPLANT Bilateral 6/12/14    MAXILLARY ANTROSTOMY  4/9/2019    Performed by Adin Burks III, MD at [facility] OR [address] [place] [address]    MYRINGOTOMY W/ TUBES Right 04/2017    MYRINGOTOMY WITH INSERTION OF PE TUBES Right 4/15/2017    Performed by Gary Null MD at [facility] OR [address] [place] [address]    PLACEMENT-TUBE-PEG  5/15/2014     [facility]    PORTACATH PLACEMENT Right     rt sc    REMOVAL-TUBE-PEG  5/15/2014    Performed by David Moses MD at [facility] OR [address] [place] [address]    RHC for lung re-transplant N/A 1/22/2019    Performed by Laura Rachel MD at [facility] CATH LAB    ROBOTIC ASSISTED LAPAROSCOPIC HYSTERECTOMY N/A 4/25/2017    Performed by Deny Dias Jr., MD at [facility] OR    SALPINGECTOMY Bilateral 2015    [facility]    SALPINGECTOMY-LAPAROSCOPIC Bilateral 5/19/2015    Performed by Deny Dias Jr., MD at [facility] OR    SINUS SURGERY FUNCTIONAL ENDOSCOPIC WITH NAVIGATION Bilateral 4/3/2017    Performed by Cesar Olsen MD at [facility] OR [address] [place] [address]    SINUS SURGERY FUNCTIONAL ENDOSCOPIC WITH NAVIGATION, 12914, 06589, 17908, 96314 Bilateral 2/5/2015    Performed by Cesar Olsen MD at [facility] OR [address] [place] [address]    SPHENOIDECTOMY Bilateral 4/9/2019    Performed by Adin Burks III, MD at [facility] OR [address] [place] [address]    THYROPLASTY - Medialization laryngoplasty, cricothyroid subluxation, arytenoid repositioning Left 8/2/2016    Performed by Gary Null MD at [facility] OR [address] [place] [address]    TRANSPLANT-LUNG Bilateral 6/11/2014    Performed by Adolfo Huston MD at [facility] OR [address] [place] [address]       Vital Signs Range (Last 24H):  Temp:  [37.1 °C (98.8 °F)]   Pulse:  [96]   Resp:  [20]   BP: (126)/(83)   SpO2:  [95 %]       CBC:     Recent Labs   Lab 06/17/19  1005   WBC 6.51   RBC 3.96*   HGB 9.5*   HCT 33.3*      MCV 84   MCH 24.0*   MCHC 28.5*       CMP:   Recent Labs   Lab 06/17/19  1005      K 3.9   CL 99   CO2 31*   BUN 16   CREATININE 0.9   *   CALCIUM 10.2   ALBUMIN 3.5   PROT 8.1   ALKPHOS 488*   ALT 64*   AST 52*   BILITOT 0.2       INR:  No results for input(s): PT, INR, PROTIME, APTT in the last 48 hours.      Diagnostic Studies:      EKG:  - NSR    2D Echo:  · Normal left ventricular systolic function. The estimated ejection fraction is 55%  · Mild left atrial enlargement.  · Normal LV diastolic  function.  · Normal right ventricular systolic function.  · Normal right atrial size.  · Mild tricuspid regurgitation.  · The estimated PA systolic pressure is 26mm Hg  · Normal central venous pressure (3 mm Hg).    Anesthesia Evaluation    I have reviewed the Patient Summary Reports.     I have reviewed the Medications.     Review of Systems  Anesthesia Hx:  Hx of Anesthetic complications  History of prior surgery of interest to airway management or planning: Personal Hx of Anesthesia complications, Post-Operative Nausea/Vomiting (TIVA was helpful), in the past, but not with recent anesthetics / prophylaxis  Hoarseness after General Anesthesia, permanent vocal cord injury, documented on ENT exam, left vocal cord   Cardiovascular:   Hypertension    Pulmonary:   Shortness of breath CF, CARLOS EDUARDO   Renal/:   Chronic Renal Disease    Neurological:   Headaches    Psych:   anxiety          Physical Exam  General:  Well nourished    Airway/Jaw/Neck:  Airway Findings: Mouth Opening: Small, but > 3cm Tongue: Normal  General Airway Assessment: Adult  Mallampati: III  Improves to II with phonation.  TM Distance: Normal, at least 6 cm  Jaw/Neck Findings:  Neck ROM: Normal ROM      Dental:  Dental Findings: In tact    Heart/Vascular:  Heart Findings: Rate: Normal  Heart murmur: negative       Mental Status:  Mental Status Findings:  Cooperative, Alert and Oriented         Anesthesia Plan  Type of Anesthesia, risks & benefits discussed:  Anesthesia Type:  general  Patient's Preference:   Intra-op Monitoring Plan: arterial line, central line and standard ASA monitors  Intra-op Monitoring Plan Comments:   Post Op Pain Control Plan: multimodal analgesia, IV/PO Opioids PRN and per primary service following discharge from PACU  Post Op Pain Control Plan Comments:   Induction:   IV  Beta Blocker:  Patient is on a Beta-Blocker and has received one dose within the past 24 hours (No further documentation required).       Informed Consent:  Patient understands risks and agrees with Anesthesia plan.  Questions answered. Anesthesia consent signed with patient.  ASA Score: 4     Day of Surgery Review of History & Physical:    H&P update referred to the surgeon.     Anesthesia Plan Notes: Patient stated per ENT 5.0 ETT to be used for intubation if possible. Last GETA 6.0 ETT used.         Ready For Surgery From Anesthesia Perspective.

## 2019-06-18 NOTE — ANESTHESIA POSTPROCEDURE EVALUATION
Anesthesia Post Evaluation    Patient: Juanita Ibarra    Procedure(s) Performed: Procedure(s) (LRB):  REDO BILATERAL LUNG TRANSPLANT; CLAMSHELL (Bilateral)    Final Anesthesia Type: general  Patient location during evaluation: ICU  Patient participation: No - Unable to Participate, Intubation  Level of consciousness: sedated  Post-procedure vital signs: reviewed and stable  Pain management: adequate  Airway patency: patent  PONV status at discharge: No PONV  Anesthetic complications: no      Cardiovascular status: hemodynamically stable  Respiratory status: ventilator and ETT  Hydration status: euvolemic  Follow-up not needed.          Vitals Value Taken Time   /63 6/18/2019  3:22 PM   Temp 36.7 °C (98 °F) 6/18/2019  3:00 PM   Pulse 93 6/18/2019  3:54 PM   Resp 8 6/18/2019  3:54 PM   SpO2 100 % 6/18/2019  3:54 PM   Vitals shown include unvalidated device data.      No case tracking events are documented in the log.      Pain/Richard Score: Pain Rating Prior to Med Admin: 10 (6/18/2019  3:00 PM)  Pain Rating Post Med Admin: 4 (6/18/2019  5:09 AM)

## 2019-06-18 NOTE — PROGRESS NOTES
Pt. Arrived to floor via wheelchair; pt. On 5L NC of home oxygen tank. Pt ambulates independently. VSS     R. Port accessed with 20 G, 1 inch needle; flushed without difficulty and no blood return present

## 2019-06-18 NOTE — PROGRESS NOTES
R chest port accessed 1 inch 20 gauge - no blood return.  Sterile central line dressing placed.   Orders re-entered to draw labs.

## 2019-06-18 NOTE — HPI
Juanita Ibarra is a 29 y.o. female with CF s/p BL Lung Transplant June 2014 complicated by bronchiolitis obliterans and left vocal chord paralysis s/p L arytenopexy/laryngoplasty(implant) and cricoid subluxation. In addition has HTN, Anxiety, chronic opioid use. Does not have DM. She is s/p bilateral lung transplant 6/18/2019. A clamshell thoracotomy was used. EBL approximately 1 liter, 550 returned through cell saver. Given 5u pRBCs, 4u FFP, 1 pack of platelets. Transported to SICU for recovery.

## 2019-06-18 NOTE — ASSESSMENT & PLAN NOTE
Glucocorticoids markedly increase  glucoses. Expect the steroid taper will help glucose control.

## 2019-06-18 NOTE — TRANSFER OF CARE
"Anesthesia Transfer of Care Note    Patient: Juanita Ibarra    Procedure(s) Performed: Procedure(s) (LRB):  REDO BILATERAL LUNG TRANSPLANT; CLAMSHELL (Bilateral)    Patient location: ICU    Anesthesia Type: general    Transport from OR: Transported from OR intubated on 100% O2 by AMBU with assisted ventilation. Upon arrival to PACU/ICU, patient attached to ventilator and auscultated to confirm bilateral breath sounds and adequate TV. Upon arrival to PACU/ICU, patient attached to 100% O2 by T-piece with adequate spontaneous ventilation. Continuous ECG monitoring in transport. Continuous SpO2 monitoring in transport. Continuos invasive BP monitoring in transport    Post pain: adequate analgesia    Post assessment: no apparent anesthetic complications    Post vital signs: stable    Level of consciousness: sedated    Nausea/Vomiting: no nausea/vomiting    Complications: none    Transfer of care protocol was followed      Last vitals:   Visit Vitals  /85 (BP Location: Left arm, Patient Position: Sitting)   Pulse 82   Temp 36.7 °C (98.1 °F) (Oral)   Resp 20   Ht 5' 1" (1.549 m)   Wt 59 kg (129 lb 15.4 oz)   LMP 10/02/2016   SpO2 95%   Breastfeeding? No   BMI 24.56 kg/m²     "

## 2019-06-18 NOTE — PROGRESS NOTES
- Cathflo 2 mg/2 mL instilled into patient's Port-a-cath per order of Dr. Morin to attempt to restore blood return to Port.  - Volume and instillation witnessed and verified with MIRIAM Mijares.  - Will withdraw Cathflo after 1 hour.    Addendum 2330: Cathflo successful. Pulled back from Port using 10 mL syringe and was able to aspirate Cathflo solution and fill the rest of the syringe with blood. Re-saline locked Port-a-cath.

## 2019-06-18 NOTE — ANESTHESIA PROCEDURE NOTES
SUZY    Diagnosis: lung transplant  Patient location during procedure: OR  Procedure start time: 6/18/2019 5:31 AM  Timeout: 6/18/2019 5:30 AM  Procedure end time: 6/18/2019 5:40 AM  Exam type: Baseline  Staffing  Anesthesiologist: Cande Mclaughlin MD  Performed: fellow and anesthesiologist   Preanesthetic Checklist  Completed: patient identified, surgical consent, pre-op evaluation, timeout performed, risks and benefits discussed, monitors and equipment checked, anesthesia consent given, oxygen available, suction available, hand hygiene performed and patient being monitored  Setup & Induction  Patient preparation: bite block inserted  Probe Insertion: easy  Exam: complete        Exam     Left Heart  Left Atruim: normal (men 3.0-4.0; women 2.7-3.8)    Left Ventricle: cm, normal (men 4.2-5.9; women 3.8-5.2)  LV Wall Thickness (posterior wall): cm, normal (men 0.6-1.0 cm; women 0.6-0.9 cm)    LVAD:no  Estimated Ejection Fraction: > 55% normal  Regional Wall Abnormalities: no RWMA            Right Heart  Right Ventricle: normal  Right Ventricle Function: normal    Intra Atrial Septum  PFO: no shunt by color flow doppler  no IAS aneurysm  no lipomatous hypertrophy  no Atrial Septal Defect (Asd)    Right Ventricle  Size: normal, Free Wall Thickness: normal    Aortic Valve:  Morphology: trileaflet  Regurgitation: no aortic valve regurgitation     Mitral Valve:  Morphology:normal  Jet Description: mild and centrally-directed    Tricuspid Valve:  Morphology: normal  TRICUSPID REGURGITATION: trace.    Pulmonic Valve:  Morphology:normal  Regurgitation(color flow): none    Great Vessels  Ascending Aorta Atherosclerosis: 1=nl-min dz  Aortic Arch Atherosclerosis: 1=nl-min dz  IABP: no  Descending Aorta Atherosclerosis: 1=nl-min dz  Aorta    Descending aorta IABP: no    Effusions  Effusions: none    Summary  Findings discussed with surgeon.    Other Findings

## 2019-06-18 NOTE — SUBJECTIVE & OBJECTIVE
Interval HPI:   Received in ICU. Remains intubated. Bg above goal; started on IV insulin infusion rates 1-1.6 u/hr.   Eating:   NPO  Nausea: No  Hypoglycemia and intervention: No  Fever: No  TPN and/or TF: No    PMH, PSH, FH, SH reviewed       Review of Systems  Unable to obtain due to: Sedation,Intubation,Altered mental status,Critical illness,Reviewed ROS from note dated 6/17/19 per Dr. Morin.    Current Medications and/or Treatments Impacting Glycemic Control  Immunotherapy:    Immunosuppressants         Stop Route Frequency     mycophenolate mofetil 200 mg/mL suspension 500 mg      -- PER NG TUBE 2 times daily     tacrolimus (PROGRAF) 1 mg/mL oral syringe      -- PER NG TUBE 2 times daily     basiliximab (SIMULECT) 20 mg in dextrose 5 % 50 mL chemo infusion      06/26 1514 IV Every 96 hours        Steroids:   Hormones (From admission, onward)    Start     Stop Route Frequency Ordered    06/21/19 0900  methylPREDNISolone sodium succinate injection 118 mg  (methylprednisolone panel)      06/22 0859 IV Daily 06/18/19 1411    06/20/19 0900  methylPREDNISolone sodium succinate (SOLU-MEDROL) 177 mg in dextrose 5 % 100 mL IVPB  (methylprednisolone panel)      06/21 0859 IV Daily 06/18/19 1411    06/19/19 0900  methylPREDNISolone sodium succinate (SOLU-MEDROL) 295 mg in dextrose 5 % 100 mL IVPB  (methylprednisolone panel)      06/20 0859 IV Daily 06/18/19 1411        Pressors:    Autonomic Drugs (From admission, onward)    Start     Stop Route Frequency Ordered    06/18/19 1515  EPINEPHrine (ADRENALIN) 5 mg in sodium chloride 0.9% 250 mL infusion     Question Answer Comment   Titrate by: (in mcg/kg/min) 0.05    Titrate interval: (in minutes) 5    Titrate to maintain: (SBP or MAP or Cardiac Index) MAP    Greater than: (in mmHg) 60    Cardiac index greater than: (in L/min) 2.2    Maximum dose: (in mcg/kg/min) 2        -- IV Continuous 06/18/19 1411        Hyperglycemia/Diabetes Medications:   Antihyperglycemics (From  admission, onward)    Start     Stop Route Frequency Ordered    06/18/19 1515  insulin regular (Humulin R) 100 Units in sodium chloride 0.9% 100 mL infusion     Question:  Insulin rate changes (DO NOT MODIFY ANSWER)  Answer:  [email or web address]    -- IV Continuous 06/18/19 1411             PHYSICAL EXAMINATION:  Vitals:    06/17/19 2000   BP: 127/85   Pulse: 82   Resp: 20   Temp: 98.1 °F (36.7 °C)     Body mass index is 24.56 kg/m².    Physical Exam   Constitutional: Well developed, well nourished, NAD.  ENT: External ears no masses with nose patent; normal hearing.   Neck: Supple; trachea midline.   Cardiovascular: Normal heart sounds, no LE edema. DP +2 bilaterally.  Lungs: Intubated on ventilator; lungs anterior bilaterally clear to auscultation.  Abdomen: Soft, no masses, no hernias.  MS: No clubbing or cyanosis of nails noted;  unable to assess gait.  Skin: No rashes, lesions, or ulcers; no nodules. Transverse thoracic incision closed with dressing; chest tubes.   Psychiatric: DAVE  Neurological:DAVE  Foot: nails in good condition, no amputations noted.

## 2019-06-18 NOTE — SUBJECTIVE & OBJECTIVE
Subjective:     Interval History: POD#0 from BLT (re-transplant) on CPB.     Patient arrived to ICU this afternoon around 1500. Intra-operatively, she received 5u PRBCs, 1u plts, and 4u FFP. Good initial graft function leaving the OR and upon arrival to ICU. PGD0 currently.  ABG upon arrival 7/37/37.6/262/21.8 on PC PEEP 8, Rate 20, FIO2 40%, Pi 20, Ti 0.8. Lactic 2.5 upon arrival.    Patient arrived not requiring any vasopressor support and was started on nicardipine upon arrival to SICU.     Dilaudid 1mg x2 given this afternoon. Pain management/anesthesia to follow. Will need aggressive bowel regimen moving forward.    Continuous Infusions:   dextrose 5 % and 0.45 % NaCl with KCl 20 mEq      epinephrine infusion      fentanyl      insulin (HUMAN R) infusion (adults)      nicardipine      propofol       Scheduled Meds:   ampicillin-sulbactim (UNASYN) IVPB  1.5 g Intravenous Q6H    aspirin  325 mg Per NG tube Once    aspirin  325 mg Oral Daily    basiliximab (SIMULECT) chemo infusion  20 mg Intravenous Q96H    chlorhexidine  10 mL Mouth/Throat BID    famotidine (PF)  20 mg Intravenous BID    fluconazole  400 mg Per NG tube Daily    ganciclovir (CYTOVENE) IVPB  5 mg/kg Intravenous Q12H    hydromorphone (PF)        HYDROmorphone  1 mg Intravenous Once    meropenem (MERREM) IVPB  1 g Intravenous Q8H    [START ON 6/19/2019] methylPREDNISolone (SOLU-Medrol) IVPB (doses > 250 mg)   Intravenous Daily    Followed by    [START ON 6/20/2019] methylPREDNISolone (SOLU-Medrol) IVPB (doses > 250 mg)   Intravenous Daily    Followed by    [START ON 6/21/2019] methylPREDNISolone sodium succinate  2 mg/kg Intravenous Daily    mupirocin  1 g Nasal BID    [START ON 6/20/2019] mycophenolate mofetil  500 mg Per NG tube BID    niCARdipine        polyethylene glycol  17 g Oral Daily    [START ON 6/19/2019] sulfamethoxazole-trimethoprim 200-40 mg/5 ml  20 mL Per NG tube Every Mon, Wed, Fri    [START ON 6/20/2019]  tacrolimus  0.5 mg Per NG tube BID    vancomycin (VANCOCIN) IVPB  20 mg/kg Intravenous Once     PRN Meds:acetaminophen, dextrose 50%, dextrose 50%, diphenhydrAMINE, HYDROmorphone, lactulose, levalbuterol, magnesium sulfate IVPB, metoclopramide HCl, ondansetron, oxyCODONE, oxyCODONE, potassium chloride in water **AND** potassium chloride in water **AND** potassium chloride in water, sodium phosphate IVPB, sodium phosphate IVPB, sodium phosphate IVPB    Review of patient's allergies indicates:   Allergen Reactions    Albuterol Palpitations    Colistin Anaphylaxis    Vancomycin analogues      Infusion reaction that does not resolve with slowing  Pt. States she can tolerate it when given with 50 mg Benadryl and ran over 3 hours    Neupogen [filgrastim] Other (See Comments)     Ostealgia after five daily doses of 300 mcg.      Bactrim [sulfamethoxazole-trimethoprim] Hives    Ceftazidime Hives     Pt stated can tolerate cefapine not ceftazidime    Ceftazidime     Dronabinol Other (See Comments)     Mental changes/hallucinations    Haldol [haloperidol lactate] Other (See Comments)     Seizure like activity    Nsaids (non-steroidal anti-inflammatory drug)      Cannot have due to lung transplant    Adhesive Rash     Cloth tape- please use tegaderm or paper tape    Aztreonam Rash    Ciprofloxacin Nausea And Vomiting     Projectile N/V, per patient.  Unwilling to retry therapy.       Review of Systems   Unable to perform ROS: Intubated     Objective:   Physical Exam   Constitutional: She appears well-developed and well-nourished. No distress. She is sedated and intubated.   HENT:   Head: Normocephalic and atraumatic.   Right Ear: External ear normal.   Left Ear: External ear normal.   ETT/OGT in place   Eyes: Pupils are equal, round, and reactive to light. Conjunctivae and EOM are normal.   Neck: Neck supple. No JVD present.   Right IJ CVC with dressing c/d/i; right subclavian port with dressing c/d/i    Cardiovascular: Normal rate, regular rhythm and normal heart sounds.   Pulmonary/Chest: She is intubated. She has decreased breath sounds in the right lower field and the left lower field. She has no wheezes. She has no rhonchi. She has no rales.   Mechanical breath sounds  Bilateral pleural and mediastinal chest tubes with serosanguinous output   Abdominal: Soft. Bowel sounds are normal. She exhibits distension. There is no tenderness.   Obese abdomen   Genitourinary:   Genitourinary Comments: Goldberg to gravity   Musculoskeletal:   Sedated   Neurological:   Intubated and sedated   Skin: Skin is warm and dry. She is not diaphoretic. There is pallor.   Psychiatric:   Intubated and sedated   Nursing note and vitals reviewed.        Vital Signs (Most Recent):  Temp: 98 °F (36.7 °C) (06/18/19 1500)  Pulse: 88 (06/18/19 1500)  Resp: 20 (06/18/19 1500)  BP: 115/63 (06/18/19 1500)  SpO2: 100 % (06/18/19 1500) Vital Signs (24h Range):  Temp:  [98 °F (36.7 °C)-98.8 °F (37.1 °C)] 98 °F (36.7 °C)  Pulse:  [81-96] 88  Resp:  [9-20] 20  SpO2:  [95 %-100 %] 100 %  BP: (115-127)/(63-85) 115/63  Arterial Line BP: (126)/(75) 126/75     Weight: 59 kg (129 lb 15.4 oz)  Body mass index is 24.56 kg/m².      Intake/Output Summary (Last 24 hours) at 6/18/2019 1556  Last data filed at 6/18/2019 1329  Gross per 24 hour   Intake 3994 ml   Output 575 ml   Net 3419 ml       Ventilator Data:     Vent Mode: A/C  Oxygen Concentration (%):  [40] 40  Resp Rate Total:  [26 br/min-28 br/min] 28 br/min  PEEP/CPAP:  [8 cmH20] 8 cmH20  Mean Airway Pressure:  [15 cmH20] 15 cmH20    Hemodynamic Parameters:  CO:  [5.6 L/min] 5.6 L/min  CI:  [3.6 L/min/m2] 3.6 L/min/m2    Lines/Drains:       Port A Cath Single Lumen 06/24/14 1200 right subclavian (Active)   Accessed by: Radha Ortega RN 6/17/2019  8:00 PM   Dressing Type Transparent 6/17/2019  8:00 PM   Dressing Status Clean;Dry;Intact;Biopatch in place 6/17/2019  8:00 PM   Dressing Intervention  Dressing reinforced 6/17/2019  8:00 PM   Dressing Change Due 06/24/19 6/17/2019  8:00 PM   Line Status No blood return;Saline locked;Flushed 6/17/2019  8:00 PM   Flush Performed Yes 6/17/2019  8:00 PM   Date to be Reflushed 06/18/19 6/17/2019  8:00 PM   Daily Line Review Performed 6/17/2019  8:00 PM   Type of Needle Nicole 6/17/2019  8:00 PM   Gauge 20 6/17/2019  8:00 PM   Needle Length 1 in 6/17/2019  8:00 PM   Needle Status Left in place 6/17/2019  8:00 PM   Needle Insertion Date 06/17/19 6/17/2019  8:00 PM   Needle Insertion Time 1900 6/17/2019  8:00 PM   Number of days: 1820            Percutaneous Central Line Insertion/Assessment - double lumen  06/18/19 0510 (Active)   Number of days: 0            Arterial Line 06/18/19 0501 Left Other (Comment) (Active)   Number of days: 0            Urethral Catheter 06/18/19 0504 Non-latex;Straight-tip;Temperature probe 14 Fr. (Active)   Number of days: 0            Y Chest Tube 1 and 2 06/18/19 1151 1 Right Mediastinal 19 Fr. 2 Left Mediastinal 19 Fr. (Active)   Number of days: 0            Y Chest Tube 3 and 4 06/18/19 1151 3 Right Pleural 19 Fr. 4 Left Pleural 19 Fr. (Active)   Number of days: 0       Significant Labs:  CBC:  Recent Labs   Lab 06/18/19  1411 06/18/19  1447   WBC 9.10  --    RBC 3.68*  --    HGB 10.1*  --    HCT 30.6* 28*   PLT 70*  --    MCV 83  --    MCH 27.4  --    MCHC 33.0  --      BMP:  Recent Labs   Lab 06/18/19  1411      K 4.0  4.0  4.0      CO2 19*   BUN 22*   CREATININE 0.9   CALCIUM 8.8      Tacrolimus Levels:  No results for input(s): TACROLIMUS in the last 72 hours.  Microbiology:  Microbiology Results (last 7 days)     Procedure Component Value Units Date/Time    Gram stain [[id number]] Collected:  06/18/19 0930    Order Status:  Completed Specimen:  Tissue from Lung, Left Updated:  06/18/19 1041     Gram Stain Result Moderate WBC's      No organisms seen    Narrative:       Donor lung    Aerobic culture [[id number]] Collected:   06/18/19 0930    Order Status:  Sent Specimen:  Tissue from Lung, Left Updated:  06/18/19 0946    Culture, Anaerobe [[id number]] Collected:  06/18/19 0930    Order Status:  Sent Specimen:  Tissue from Lung, Left Updated:  06/18/19 0945    AFB Culture & Smear [[id number]] Collected:  06/18/19 0930    Order Status:  Sent Specimen:  Tissue from Lung, Left Updated:  06/18/19 0945    Fungus culture [[id number]] Collected:  06/18/19 0930    Order Status:  Sent Specimen:  Tissue from Lung, Left Updated:  06/18/19 0944    Urine culture [[id number]] Collected:  06/17/19 1753    Order Status:  Sent Specimen:  Urine, Clean Catch Updated:  06/17/19 2012          I have reviewed all pertinent labs within the past 24 hours.    Diagnostic Results:  Chest X-Ray: I personally reviewed the films and findings are:     X-Ray: small right apical PTX

## 2019-06-18 NOTE — PROGRESS NOTES
- All preoperative tasks completed. Pt has taken Hibiclens shower and has ROCK hose in place. Pt is wearing only a gown. Pt is not wearing jewelry or glasses.  - Pt is to have her 2nd bilateral lung transplant via a clamshell approach; anxiety noted; multiple family members at bedside.  - Surgical consent, blood product consent, and anesthesia consent signed and witnessed. Pt has been NPO for 8.5 hours now.  - Pt is sitting up in bed wearing 5 L nasal cannula and awaiting transport to OR (original time was 0200).    Addendum 0415: Anesthesia waiting outside patient's room to transport to OR. In the interim patient c/o anxiety and chronic pain symptoms. Anesthesia MD entered orders for 5 mg oxycodone and requested this RN to administer PRN PO Ativan already available. Administered.

## 2019-06-18 NOTE — OP NOTE
[facility]  Cardiothoracic Surgery Operative Report    Patient Name:  Juanita Ibarra; [id number]    Preoperative Diagnosis: end stage lung disease s/p bilateral lung transplantation    Postoperative Diagnosis:  Same    Date of Operation:  06/18/2019    Operation:    · Redo bilateral sequential double lung transplant with clamshell thoracotomy and cardiopulmonary bypass  · Backbench preparation of donor lungs.  · Reoperative cardiothoracic surgery    Co-Surgeon:  Jonathan Newby MD    Co-Surgeon:  Leo Coello MD    Anesthesiologist:  Quan Walden MD; Cande Mclaughlin MD    A second surgeon was involved in the case due to no qualified resident being available and due to the complex nature of the procedure including severe bilateral pleural adhesions, severe mediastinal adhesions with the left pulmonary artery plastered to the left mainstem bronchus, and significant complexity of a redo bilateral lung transplantation.    ---------------------------------------------------------------------------------------------------------------------      Indications for surgery: The patient is a 29 year old female with prior bilateral sequential lung transplant in 2014 for cystic fibrosis, now with bronchiolitis obliterans and end stage lung disease. The risks and benefits were explained and informed consent was obtained.  The procurement of the organ was performed by Dr. Angelica Gomez at [facility].    Gross findings: Severe bilateral pleural adhesions, severe mediastinal adhesions.  Preserved biventricular function and good initial graft function.        Procedure:  The patient was brought to the holding area and the indications for surgery were reviewed.  She was placed supine on the operating room table and prepped and draped in the usual sterile fashion. A surgical time out was performed.     Prior to induction, we performed the appropriate UNOS timeout confirming the blood type of the donor  and recipient.  We also performed the UNOS timeout when the lungs entered the room prior to implantation per UNOS policy.    Due to the reoperative status of the procedure, a 5cm incision was made over the right femoral artery and vein.  These vessels were carefully dissected out and pursestring sutures placed in preparation for bypass.    A clamshell thoracotomy (bilateral thoracotomy with transverse sternotomy) was performed through the 5th intercostal space and we opened both pleural spaces and mediastinum.  Severe pleural adhesions were noted and moderate to severe mediastinal adhesions were noted.  These adhesions were meticulously taken down with sharp and blunt dissection as well as cautery.  During dissection of the hilum, the cautery was lowered to 20 and a combination of sharp dissection and minimal cautery was used.  The phrenic nerves were identified and preserved.  ACT guided heparinization was administered and the ascending aorta, right atrium, and right femoral vein were cannulated.  An aortic root vent was placed in the ascending aorta for de-airing.  Cardiopulmonary bypass was commenced.      Attention was first turned to the right lung, in which I performed the pneumonectomy with Dr. Coello assisting.  The pulmonary arteries and veins were divided with a vascular stapler.  The bronchus was next divided and the pneumonectomy completed.  The airway and vascular structures were prepared for implant.  Again, we were mindful of the phrenic nerve and preserved it.  We then turned our attention to the left side.  The pneumonectomy on this side was performed by Dr. Coello with my assistance in a similar fashion.  These structures were prepared for implantation.     The donor lungs were then prepared for implantation, first concentrating on the left lung.  Dr. Coello performed the left pneumonectomy with my assistance.  The implant was initiated with a 4-0 PDS suture on the airway.  We confirmed the  ascending aortic root vent was actively de-airing and continued with the implantation.  The venous anastomosis and arterial anastomosis were performed with a running 4-0 Prolene suture.  After bronchoscopy confirmed an intact anastomosis, we gently ventilated the lung.  The right lung implantation was performed in a similar fashion by myself and with Dr. Coello assisting.  The bronchial anastomosis was completed then the venous and arterial anastomoses completed and the lungs perfused.     Bronchoscopy was performed bilaterally and we ventilated the patient.  All anastomoses were checked for hemostasis and the patient was weaned from bypass after a brief period of reperfusion with adequate graft function.  The cannulae were removed and heparin was reversed.  We had some coagulopathy, which required minimal blood products and resolved.  Drainage tubes were placed behind the sternum and in the pleural spaces. The chest was closed with numerous heavy nonabsorbable sutures in a figure-of-eight fashion for the bilateral thoracotomy sites and steel wires for the transverse sternotomy site.  The soft tissue was closed with absorbable suture.  A sterile dressing was applied and the patient was brought to the ICU in stable condition with good graft function.      I was present in the operating room for the entire operation and immediately available thereafter.    The total cardiopulmonary bypass time was 192 minutes with donor clamp time at 0733 and started the first anastomosis at 0928.  The left cold ischemic time was 115 minutes and warm ischemic time 24 minutes.  The right cold ischemic time was 179 minutes and warm ischemic time 40 minutes.

## 2019-06-18 NOTE — ASSESSMENT & PLAN NOTE
kati Ibarra is a 29 y.o. female with CF s/p BL Lung Transplant June 2014 complicated by bronchiolitis obliterans and left vocal chord paralysis s/p L arytenopexy/laryngoplasty(implant) and cricoid subluxation. In addition has HTN, Anxiety, chronic opioid use. Does not have DM. She is s/p bilateral lung transplant 6/18/2019. A clamshell thoracotomy was used. Transported to SICU for recovery.     Neuro:   - Sedation:Propofol and fentanyl  - Hx/o chronic opioid use in post-op patient    - Propofol ggt with PRN dilauded while sedated   - Consult acute pain service if needed for assistance with post-op pain control   - Consider bilateral TARA blocks     Pulmonary:   - Intubated:  Vent Mode: A/C  Oxygen Concentration (%):  [40-41] 41  Resp Rate Total:  [20 br/min-28 br/min] 20 br/min  PEEP/CPAP:  [8 cmH20] 8 cmH20  Mean Airway Pressure:  [15 cmH20] 15 cmH20  - Wean vent as tolerated.   - ABGs PRN  - Daily CXR while intubated     Cardiac:  - Drips: Cardene for MAP < 80  - Keep MAPs >65  - HTN   -Hold home anti-hypertensives in acute setting   - Monitor chest tube output and character    Renal:   - Trend BUN/Cr  - Monitor UOP     ID:   - Afebrile, trend WBC  - Abx:Meropenem, IV Vanc, fluconazole  - Give benadryll prior to Vanc administration to prevent red man syndrome  - Will continue to monitor for signs of infection   - ID consulted, f/u recs    Immuno:  - Cellcept, tacro, steroid taper  - Immunomodulatory drugs per CTS  - Basiliximab q4 days x2 doses     Hem:  - Trend Hgb post-op  - No signs of active bleeding     Endocrine:  - Insulin gtt   - Monitor BG     FEN/GI:   - NPO   - Replace lytes PRN      PPx:  - Asa       Dispo:  - Continue ICU Care      Primary:   CTS

## 2019-06-18 NOTE — HPI
Reason for Consult: Management of  Hyperglycemia and CF related pancreatic insufficiency.     Surgical Procedure and Date: lung transplant in 2014; 6/18/19    HPI:   Patient is a 29 y.o. female with a diagnosis of CF, HTN, migraine headache, and osteopenia. 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 No personal history of DM. Endocrinology consulted for BG management.     Lab Results   Component Value Date    HGBA1C 4.7 01/09/2019

## 2019-06-18 NOTE — CONSULTS
[facility]  Infectious Disease  Consult Note    Patient Name: Juanita Ibarra  MRN: [id number]  Admission Date: 6/17/2019  Hospital Length of Stay: 0 days  Attending Physician: Francisca Morin DO  Primary Care Provider: Primary Doctor No     Isolation Status: No active isolations    Consult received, full consult to follow      Inpatient consult to Infectious Diseases  Consult performed by: Ann Sequeira MD  Consult ordered by: Cande Mclaughlin MD

## 2019-06-18 NOTE — ASSESSMENT & PLAN NOTE
CMV D-/R+. Previously on Dapsone for PCP prophylaxis.  Will plan to resume dapsone tomorrow.  Continue post-operative Vanc/Merrem given previous microbiology data. ID following, appreciate recs.  Continue OIP with fluconazole and ganciclovir.

## 2019-06-18 NOTE — TELEPHONE ENCOUNTER
Attempted to return call, no answer.    Contacted [facility] and confirmed that patient is finished with Vancomycin infusions.  He asked if she can be removed from their service.  I confirmed that she can.  He verbalized understanding.    ----- Message from Gregory Howell sent at 6/18/2019  9:48 AM CDT -----  Contact: [facility]  Please call [facility] at [phone]     Need a written or verbal discharge order for Vancomycin     Thank you

## 2019-06-18 NOTE — H&P
[facility]  Critical Care - Surgery  History & Physical    Patient Name: Juanita Ibarra  MRN: [id number]  Admission Date: 6/17/2019  Code Status: Full Code  Attending Physician: Jonathan Newby MD   Primary Care Provider: Primary Doctor No   Principal Problem: S/P lung transplant    Subjective:     HPI:  Juanita Ibarra is a 29 y.o. female with CF s/p BL Lung Transplant June 2014 complicated by bronchiolitis obliterans and left vocal chord paralysis s/p L arytenopexy/laryngoplasty(implant) and cricoid subluxation. In addition has HTN, Anxiety, chronic opioid use. Does not have DM. She is s/p bilateral lung transplant 6/18/2019. A clamshell thoracotomy was used. EBL approximately 1 liter, 550 returned through cell saver. Given 5u pRBCs, 4u FFP, 1 pack of platelets. Transported to SICU for recovery.      Hospital/ICU Course:  No notes on file    Follow-up For: Procedure(s) (LRB):  REDO BILATERAL LUNG TRANSPLANT; CLAMSHELL (Bilateral)    Post-Operative Day: Day of Surgery     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    Cystic fibrosis of the lung     Ear infection     Hypertension     Migraine headache     MRSA (methicillin resistant Staphylococcus aureus) carrier     Osteopenia     Other specified disease of pancreas     Pain disorder     Seizures 2013    Sinusitis, chronic     Vocal cord paralysis 06/2014    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       Past Surgical History:   Procedure Laterality Date    ABDOMINAL SURGERY      peg tube     LMABGABE-GGAVXFCERPH-[id number] N/A 5/19/2015    Performed by Deny Dias Jr., [facility] at [facility] OR    BIOPSY-BRONCHUS N/A 12/20/2016    Performed by Jake Alvarez MD at [facility] OR 2ND FLR    BRONCHOSCOPY Bilateral 12/11/2018    Performed by Francisca Morin DO at [facility] OR 2ND FLR    BRONCHOSCOPY N/A 10/1/2018    Performed by Jake Alvarez MD at [facility] OR  2ND FLR    BRONCHOSCOPY Bilateral 12/20/2016    Performed by Jake [facility]varez MD at [facility] OR 2ND FLR    BRONCHOSCOPY - flexible bronchoscopy with probable tissue biopsy CPT 58971 N/A 7/21/2015    Performed by Jake Alvarez MD at [facility] OR 2ND FLR    BRONCHOSCOPY - flexible bronchoscopy with probable tissue biospy CPT 07364 N/A 10/20/2015    Performed by Jake Alvarez MD at [facility] OR 2ND FLR    BRONCHOSCOPY - flexible bronchoscopy with tissue biopsy CPT 52724 N/A 9/29/2015    Performed by Jake Alvarez MD at [facility] OR 2ND FLR    BRONCHOSCOPY - flexible bronchoscopy with tissue biopsy CPT 02192 N/A 5/26/2015    Performed by Jake Alvarez MD at [facility] OR 1ST FLR    BRONCHOSCOPY flexible bronchoscopy with tissue biopsy N/A 9/8/2014    Performed by Jake Alvarez MD at [facility] OR 2ND FLR    BRONCHOSCOPY flexible with possible tissue biopsy N/A 8/8/2014    Performed by Jake Alvarez MD at [facility] OR 2ND FLR    BRONCHOSCOPY, BAL, BIOPSIES N/A 3/20/2018    Performed by Jake Alvarez MD at [facility] OR 2ND FLR    BRONCHOSCOPY-FIBEROPTIC N/A 1/29/2016    Performed by Joann Cifuentes DO at [facility] OR 2ND FLR    BRONCHOSCOPY; Bronchoscopy with BAL and transbronchial biopsies under general anesthesia N/A 5/29/2018    Performed by Jake Alvarez MD at [facility] OR 2ND FLR    CHOLECYSTECTOMY  2016    CHOLECYSTECTOMY-LAPAROSCOPIC N/A 7/22/2016    Performed by Joshua Goldberg, MD at [facility] OR 2ND FLR    COLONOSCOPY N/A 5/3/2019    Performed by Juancho Rich MD at [facility] ENDO (2ND FLR)    ENDOMETRIAL ABLATION  2015    KJB    ETHMOIDECTOMY Bilateral 4/9/2019    Performed by Adin Burks III, MD at [facility] OR 2ND FLR    FESS      FESS Bilateral 10/21/2013    Performed by Jared Whatley MD at [facility] OR 2ND FLR    FESS, USING COMPUTER-ASSISTED NAVIGATION N/A 4/9/2019    Performed by Adin Burks III, MD at [facility] OR 2ND FLR    FINE NEEDLE ASPIRATION (FNA)  of a cervical lymphadenopathy  1/29/2016     Performed by Joann Cifuentes DO at [facility] OR [address] [place] [address]    flex bronchoscopy with probable tissue biopsy N/A 7/31/2014    Performed by [facility] Diagnostic Provider at [facility] OR [address] [place] [address]    flexible bronchoscopy with tissue biopsy N/A 12/11/2014    Performed by Jake Alvarez MD at [facility] OR [address] [place] [address]    HYSTERECTOMY  04/2017    [facility]    INJECTION-VOCAL CORD Left 6/24/2014    Performed by Jared Whatley MD at [facility] OR [address] [place] [address]    IRCNQEEWX-AFFW-E-CATH to right chest and removal of portacath to left chests wall. Bilateral 6/24/2014    Performed by Zhen Dorado MD at [facility] OR [address] [place] [address]    LARYNX SURGERY  2016    LUNG TRANSPLANT Bilateral 6/12/14    MAXILLARY ANTROSTOMY  4/9/2019    Performed by Adin Burks III, MD at [facility] OR [address] [place] [address]    MYRINGOTOMY W/ TUBES Right 04/2017    MYRINGOTOMY WITH INSERTION OF PE TUBES Right 4/15/2017    Performed by Gary Null MD at [facility] OR [address] [place] [address]    PLACEMENT-TUBE-PEG  5/15/2014    Performed by David Moses MD at [facility] OR [address] [place] [address]    PORTACATH PLACEMENT Right     rt sc    REMOVAL-TUBE-PEG  5/15/2014    Performed by David Moses MD at [facility] OR [address] [place] [address]    RHC for lung re-transplant N/A 1/22/2019    Performed by Laura Rachel MD at [facility] CATH LAB    ROBOTIC ASSISTED LAPAROSCOPIC HYSTERECTOMY N/A 4/25/2017    Performed by Deny Dias Jr., MD at [facility] OR    SALPINGECTOMY Bilateral 2015    KJB    SALPINGECTOMY-LAPAROSCOPIC Bilateral 5/19/2015    Performed by Deny Dais Jr., MD at [facility] OR    SINUS SURGERY FUNCTIONAL ENDOSCOPIC WITH NAVIGATION Bilateral 4/3/2017    Performed by Cesar Olsen MD at [facility] OR [address] [place] [address]    SINUS SURGERY FUNCTIONAL ENDOSCOPIC WITH NAVIGATION, 98471, 33997, 02347, 07630 Bilateral 2/5/2015    Performed by Cesar Olsen MD at [facility] OR [address] [place] [address]    SPHENOIDECTOMY Bilateral 4/9/2019    Performed by Adin Burks III, MD at [facility] OR [address] [place] [address]    THYROPLASTY - Medialization laryngoplasty, cricothyroid subluxation, arytenoid  repositioning Left 8/2/2016    Performed by Gary Null MD at [facility] OR 2ND FLR    TRANSPLANT-LUNG Bilateral 6/11/2014    Performed by Adolfo Huston MD at [facility] OR 2ND FLR       Review of patient's allergies indicates:   Allergen Reactions    Albuterol Palpitations    Colistin Anaphylaxis    Vancomycin analogues      Infusion reaction that does not resolve with slowing  Pt. States she can tolerate it when given with 50 mg Benadryl and ran over 3 hours    Neupogen [filgrastim] Other (See Comments)     Ostealgia after five daily doses of 300 mcg.      Bactrim [sulfamethoxazole-trimethoprim] Hives    Ceftazidime Hives     Pt stated can tolerate cefapine not ceftazidime    Ceftazidime     Dronabinol Other (See Comments)     Mental changes/hallucinations    Haldol [haloperidol lactate] Other (See Comments)     Seizure like activity    Nsaids (non-steroidal anti-inflammatory drug)      Cannot have due to lung transplant    Adhesive Rash     Cloth tape- please use tegaderm or paper tape    Aztreonam Rash    Ciprofloxacin Nausea And Vomiting     Projectile N/V, per patient.  Unwilling to retry therapy.       Family History     Problem Relation (Age of Onset)    Cancer Maternal Grandmother    Diabetes Maternal Grandfather    Drug abuse Maternal Grandfather    Hypertension Maternal Grandmother    Thrombocytopenia Maternal Grandfather        Tobacco Use    Smoking status: Never Smoker    Smokeless tobacco: Never Used   Substance and Sexual Activity    Alcohol use: No     Comment: Has not had an alcoholic drink in more than 2 months.    Drug use: No    Sexual activity: Yes     Partners: Male     Birth control/protection: See Surgical Hx     Comment: current partner since Oct 2016      Review of Systems   Unable to perform ROS: Intubated     Objective:     Vital Signs (Most Recent):  Temp: 98 °F (36.7 °C) (06/18/19 1500)  Pulse: 88 (06/18/19 1500)  Resp: 20 (06/18/19 1500)  BP: 115/63 (06/18/19  1500)  SpO2: 100 % (06/18/19 1500) Vital Signs (24h Range):  Temp:  [98 °F (36.7 °C)-98.8 °F (37.1 °C)] 98 °F (36.7 °C)  Pulse:  [81-96] 88  Resp:  [9-20] 20  SpO2:  [95 %-100 %] 100 %  BP: (115-127)/(63-85) 115/63  Arterial Line BP: (126)/(75) 126/75     Weight: 59 kg (129 lb 15.4 oz)  Body mass index is 24.56 kg/m².      Intake/Output Summary (Last 24 hours) at 6/18/2019 1545  Last data filed at 6/18/2019 1329  Gross per 24 hour   Intake 3994 ml   Output 575 ml   Net 3419 ml       Physical Exam   Constitutional: She appears well-developed and well-nourished.   Eyes: Pupils are equal, round, and reactive to light.   Neck: No JVD present.   Cardiovascular: Normal rate, regular rhythm and normal heart sounds.   Sternal incision site bandaged appropriately, C/D/I  Right chest tube with ss output  Left CT with ss output.   R IJ TLC   Pulmonary/Chest: Breath sounds normal.   Intubated   Abdominal: Soft. Bowel sounds are normal. She exhibits no distension.   Nursing note and vitals reviewed.      Vents:  Vent Mode: A/C (06/18/19 1452)  Set Rate: 20 bmp (06/18/19 1452)  PEEP/CPAP: 8 cmH20 (06/18/19 1452)  Oxygen Concentration (%): 40 (06/18/19 1500)  Peak Airway Pressure: 28 cmH2O (06/18/19 1452)  Plateau Pressure: 0 cmH20 (06/18/19 1452)  Total Ve: 8.19 mL (06/18/19 1452)  F/VT Ratio<105 (RSBI): (!) 29.22 (06/18/19 1452)    Lines/Drains/Airways     Central Venous Catheter Line                 Port A Cath Single Lumen 06/24/14 1200 right subclavian 1820 days         Percutaneous Central Line Insertion/Assessment - double lumen  06/18/19 0510 less than 1 day          Drain                 Urethral Catheter 06/18/19 0504 Non-latex;Straight-tip;Temperature probe 14 Fr. less than 1 day         Y Chest Tube 1 and 2 06/18/19 1151 1 Right Mediastinal 19 Fr. 2 Left Mediastinal 19 Fr. less than 1 day         Y Chest Tube 3 and 4 06/18/19 1151 3 Right Pleural 19 Fr. 4 Left Pleural 19 Fr. less than 1 day          Airway                  Airway - Non-Surgical 06/18/19 0626 Endotracheal Tube less than 1 day          Arterial Line                 Arterial Line 06/18/19 0501 Left Other (Comment) less than 1 day                Significant Labs:    CBC/Anemia Profile:  Recent Labs   Lab 06/17/19  1005  06/18/19  1116 06/18/19  1311 06/18/19  1447   WBC 6.51  --   --   --   --    HGB 9.5*  --   --   --   --    HCT 33.3*   < > 24* 20* 28*     --   --   --   --    MCV 84  --   --   --   --    RDW 17.7*  --   --   --   --     < > = values in this interval not displayed.        Chemistries:  Recent Labs   Lab 06/17/19  1005 06/18/19  1411    138   K 3.9 4.0  4.0  4.0   CL 99 105   CO2 31* 19*   BUN 16 22*   CREATININE 0.9 0.9   CALCIUM 10.2 8.8   ALBUMIN 3.5  --    PROT 8.1  --    BILITOT 0.2  --    ALKPHOS 488*  --    ALT 64*  --    AST 52*  --    MG  --  1.5*   PHOS  --  4.9*       ABGs:   Recent Labs   Lab 06/18/19  1447   PH 7.372   PCO2 37.6   HCO3 21.8*   POCSATURATED 100   BE -3     Coagulation:   Recent Labs   Lab 06/17/19  1956   INR 1.0   APTT 23.9     Lactic Acid: No results for input(s): LACTATE in the last 48 hours.  POCT Glucose:   Recent Labs   Lab 06/18/19  1444   POCTGLUCOSE 177*       Significant Imaging: I have reviewed and interpreted all pertinent imaging results/findings within the past 24 hours.    Assessment/Plan:     * S/P lung transplant  kati Ibarra is a 29 y.o. female with CF s/p BL Lung Transplant June 2014 complicated by bronchiolitis obliterans and left vocal chord paralysis s/p L arytenopexy/laryngoplasty(implant) and cricoid subluxation. In addition has HTN, Anxiety, chronic opioid use. Does not have DM. She is s/p bilateral lung transplant 6/18/2019. A clamshell thoracotomy was used. Transported to SICU for recovery.     Neuro:   - Sedation:Propofol and fentanyl  - Hx/o chronic opioid use in post-op patient    - Propofol ggt with PRN dilauded while sedated   - Consult acute pain service if needed  for assistance with post-op pain control   - Consider bilateral TARA blocks     Pulmonary:   - Intubated:  Vent Mode: A/C  Oxygen Concentration (%):  [40-41] 41  Resp Rate Total:  [20 br/min-28 br/min] 20 br/min  PEEP/CPAP:  [8 cmH20] 8 cmH20  Mean Airway Pressure:  [15 cmH20] 15 cmH20  - Wean vent as tolerated.   - ABGs PRN  - Daily CXR while intubated     Cardiac:  - Drips: Cardene for MAP < 80  - Keep MAPs >65  - HTN   -Hold home anti-hypertensives in acute setting   - Monitor chest tube output and character    Renal:   - Trend BUN/Cr  - Monitor UOP     ID:   - Afebrile, trend WBC  - Abx:Meropenem, IV Vanc, fluconazole  - Give benadryll prior to Vanc administration to prevent red man syndrome  - Will continue to monitor for signs of infection   - ID consulted, f/u recs    Immuno:  - Cellcept, tacro, steroid taper  - Immunomodulatory drugs per CTS  - Basiliximab q4 days x2 doses     Hem:  - Trend Hgb post-op  - No signs of active bleeding     Endocrine:  - Insulin gtt   - Monitor BG     FEN/GI:   - NPO   - Replace lytes PRN      PPx:  - Asa       Dispo:  - Continue ICU Care      Primary:   CTS      Critical care was time spent personally by me on the following activities: development of treatment plan with patient or surrogate and bedside caregivers, discussions with consultants, evaluation of patient's response to treatment, examination of patient, ordering and performing treatments and interventions, ordering and review of laboratory studies, ordering and review of radiographic studies, pulse oximetry, re-evaluation of patient's condition.  This critical care time did not overlap with that of any other provider or involve time for any procedures.     Ashok Boyle MD  Critical Care - Surgery  [facility]

## 2019-06-18 NOTE — BRIEF OP NOTE
[facility]  Brief Operative Note    SUMMARY     Surgery Date: 6/18/2019     Surgeon(s) and Role:     * Jonathan Newby MD - Primary     * Leo Coello MD - Assisting     * Hayes Diaz MD - Fellow     * Angelica Gomez MD - Fellow    Pre-op Diagnosis:  Cystic fibrosis with pulmonary manifestations [E84.0]    Post-op Diagnosis:  Post-Op Diagnosis Codes:     * Cystic fibrosis with pulmonary manifestations [E84.0]    Procedure(s) (LRB):  REDO BILATERAL LUNG TRANSPLANT; CLAMSHELL (Bilateral)    Anesthesia: General    Description of Procedure:   Redo bilateral lung transplant via bilateral clamshell thoracotomy    Description of the findings of the procedure:   Excellent lung function post-transplant with widely patent bilateral main stem bronchi on bronchoscopy     Estimated Blood Loss: 100mL         Specimens:   Specimen (12h ago, onward)    Start     Ordered    06/18/19 1014  Specimen to Pathology - Surgery  Once     Comments:  Pre-op Diagnosis: Cystic fibrosis with pulmonary manifestations [E84.0]Post-op Diagnosis: Procedure(s):TRANSPLANT, LUNG: bilateral Number of specimens:1Name of specimens: 1. Bilateral Lungs   (perm)     Start Status     06/18/19 1014 Collected (06/18/19 1032) Order ID: [id number]       06/18/19 1019

## 2019-06-18 NOTE — ANESTHESIA PROCEDURE NOTES
Central Line    Diagnosis: lung transplant  Patient location during procedure: done in OR  Procedure start time: 6/18/2019 5:10 AM  Timeout: 6/18/2019 5:08 AM  Procedure end time: 6/18/2019 5:15 AM  Staffing  Anesthesiologist: Cande Mclaughlin MD  Resident/CRNA: Lv Matson MD  Performed: anesthesiologist and resident/CRNA   Anesthesiologist was present at the time of the procedure.  Preanesthetic Checklist  Completed: patient identified, site marked, surgical consent, pre-op evaluation, timeout performed, IV checked, risks and benefits discussed, monitors and equipment checked and anesthesia consent given  Indication   Indication: hemodynamic monitoring, vascular access, med administration, hemodialysis     Anesthesia   general anesthesia    Central Line   Skin Prep: skin prepped with ChloraPrep, skin prep agent completely dried prior to procedure  maximum sterile barriers used during central venous catheter insertion  hand hygiene performed prior to central venous catheter insertion  Location: right, internal jugular.   Catheter type: double lumen  Catheter Size: 9 Fr  Ultrasound: vascular probe with ultrasound  Vessel Caliber: medium, patent, compressibility normal  Needle advanced into vessel with real time Ultrasound guidance.  Guidewire confirmed in vessel.  Image recorded and saved.   Manometry: Venous cannualation confirmed by visual estimation of blood vessel pressure using manometry.  Insertion Attempts: 1   Securement:line sutured, chlorhexidine patch, sterile dressing applied and blood return through all ports    Post-Procedure   X-Ray: successful placement  Adverse Events:none    Guidewire Guidewire removed intact. Guidewire removed intact, verified with nurse.

## 2019-06-18 NOTE — NURSING
- Notified Dr. Mccormack by phone that patient's last 12-lead EKG is 2.5 months old (done 4/5/19); pre-op checklist recommends one prior to surgery.  - Orders received for 12-lead EKG.    Addendum 0000: 12-lead EKG completed NSR with a ventricular rate of 77; nonspecific T wave abnormality.

## 2019-06-18 NOTE — ASSESSMENT & PLAN NOTE
Previously on tacrolimus, MMF, and daily prednisone. Will plan for basiliximab today and repeat dose on POD#4. Plan to begin immunosuppression on POD#2 with tacrolimus and MMF. Continue steroid taper per protocol.

## 2019-06-19 PROBLEM — Z76.82 AWAITING ORGAN TRANSPLANT: Status: ACTIVE | Noted: 2019-01-01

## 2019-06-19 NOTE — NURSING
Pt transported to SICU on portable monitor and ambu bag. Pt connected to vent A /C 50%/8 PEEP per Dr. Morin. Dr. Morin and Dr. Newby's teams at bedside. Pressers off at his time. Cardene started to keep MAP>65 but keep SBP <130. New pain management orders given per both CTS and LTx teams. Propofol and fentanyl gtts initiated wit dilaudid 1mg q1 hr PRN. Both services agree to consult anesthesia pain management team prior to extubation. ABGs q1 for 6hrs, then q3 for 3 occurences. All VSS at this time. Awaiting most recent lab results at this time. New orders given per Dr. Newby to administer albumin 12.5 g 250cc. Pt family in waiting room, updated on current plan of care per MD. Will continue to monitor and update physicians accordingly

## 2019-06-19 NOTE — CONSULTS
[facility]  Infectious Disease  Consult Note    Patient Name: Juanita Ibarra  MRN: [id number]  Admission Date: 6/17/2019  Hospital Length of Stay: 0 days  Attending Physician: Jonathan Newby MD  Primary Care Provider: Primary Doctor No     Isolation Status: No active isolations    Patient information was obtained from past medical records.      Consults  Assessment/Plan:     * S/P lung transplant  29-year-old female with history of CF s/p bilateral sequential lung transplant 6/2014 c/b CARLOS EDUARDO and end stage lung disease, admitted 6/18/2019 for bilateral lung re-transplant (CMV D-/R+, basiliximab induction, on tacro/MMF/pred).  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  Patient with history of MRSA and Pseudomonas pneumonia - ID consulted for antibiotic recommendations.       Recommendations:  - Continue vancomycin, meropenem IV  - Follow-up donor lung cultures  - Prophylaxis per lung transplant protocol - on dapsone, fluconazole, ganciclovir        Thank you for your consult. I will follow-up with patient. Please contact us if you have any additional questions.    Ann Sequeira MD  Infectious Disease  [facility]    Subjective:     Principal Problem: S/P lung transplant    HPI: 29-year-old female with history of CF s/p bilateral sequential lung transplant 6/2014 c/b CARLOS EDUARDO and end stage lung disease, admitted 6/18/2019 for bilateral lung re-transplant (CMV D-/R+, basiliximab induction, on tacro/MMF/pred).  ID consulted for antibiotic recommendations.  Patient currently on vanc / beatrice based on prior culture results.    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       Past Surgical History:   Procedure Laterality Date    ABDOMINAL SURGERY      peg tube     MHBICFPD-NGHGBCTZEVH-TEQUEAYMAKTG N/A 5/19/2015    Performed by Deny Dias Jr[facility] at [facility] OR    BIOPSY-BRONCHUS N/A 12/20/2016    Performed by Jake Alvarez MD at [facility] OR 2ND [facility]    BRONCHOSCOPY Bilateral 12/11/2018    Performed by Francisca Morin DO at [facility] OR [facility]    BRONCHOSCOPY N/A 10/1/2018    Performed by Jake Alvarez MD at [facility] OR [facility]    BRONCHOSCOPY Bilateral 12/20/2016    Performed by Jake Alvarez MD at [facility] OR [facility]    BRONCHOSCOPY - flexible bronchoscopy with probable tissue biopsy CPT 00251 N/A 7/21/2015    Performed by Jake Alvarez MD at [facility] OR [facility]    BRONCHOSCOPY - flexible bronchoscopy with probable tissue biospy CPT 39605 N/A 10/20/2015    Performed by Jake Alvarez MD at [facility] OR [facility]    BRONCHOSCOPY - flexible bronchoscopy with tissue biopsy CPT 41506 N/A 9/29/2015    Performed by Jake Alvarez MD at [facility] OR [facility]ND [facility]    BRONCHOSCOPY - flexible bronchoscopy with tissue biopsy CPT 42995 N/A 5/26/2015    Performed by Jake Alvarez MD at [facility] OR [facility]ST [facility]    BRONCHOSCOPY flexible bronchoscopy with tissue biopsy N/A 9/8/2014    Performed by Jake Alvarez MD at [facility] OR [facility]ND [facility]    BRONCHOSCOPY flexible with possible tissue biopsy N/A 8/8/2014    Performed by Jake Alvarez MD at [facility] OR [facility]    BRONCHOSCOPY, BAL, BIOPSIES N/A 3/20/2018    Performed by Jake Alvarez MD at [facility] OR [facility]    BRONCHOSCOPY-FIBEROPTIC N/A 1/29/2016    Performed by Joann Cifuentes DO at [facility] OR 2ND [facility]    BRONCHOSCOPY; Bronchoscopy with BAL and transbronchial biopsies under general anesthesia N/A 5/29/2018    [facility]     CHOLECYSTECTOMY  2016    CHOLECYSTECTOMY-LAPAROSCOPIC N/A 7/22/2016    [facility]    COLONOSCOPY N/A 5/3/2019    Performed by Juancho Rich MD at [facility] ENDO ([facility])    ENDOMETRIAL ABLATION  2015    KJB    ETHMOIDECTOMY Bilateral 4/9/2019    [facility]    FESS      FESS Bilateral 10/21/2013    Performed by Jared Whatley MD at [facility] OR [address] [place] [address]    FESS, USING COMPUTER-ASSISTED NAVIGATION N/A 4/9/2019    Performed by Adin Burks III, MD at [facility] OR [address] [place] [address]    FINE NEEDLE ASPIRATION (FNA)  of a cervical lymphadenopathy  1/29/2016    Performed by Joann Cifuentes DO at [facility] OR [address] [place] [address]    flex bronchoscopy with probable tissue biopsy N/A 7/31/2014    Performed by [facility] Provider at [facility] OR [address] [place] [address]    flexible bronchoscopy with tissue biopsy N/A 12/11/2014    [facility]    HYSTERECTOMY  04/2017    [facility]    INJECTION-VOCAL CORD Left 6/24/2014    [facility]    LWVXNGYBY-YNGX-I-CATH to right chest and removal of portacath to left chests wall. Bilateral 6/24/2014    [facility]    LARYNX SURGERY  2016    LUNG TRANSPLANT Bilateral 6/12/14    MAXILLARY ANTROSTOMY  4/9/2019    Per[facility]    MYRINGOTOMY W/ TUBES Right 04/2017    MYRINGOTOMY WITH INSERTION OF PE TUBES Right 4/15/2017    Performed by Gary Null MD at [facility] OR [facility]ND [facility]    PLACEMENT-TUBE-PEG  5/15/2014    [facility]    PORTACATH PLACEMENT Right     rt sc    REMOVAL-TUBE-PEG  5/15/2014    [facility]    RHC for lung re-transplant N/A 1/22/2019    Performed by Laura Rachel MD at [facility] CATH LAB    ROBOTIC ASSISTED LAPAROSCOPIC HYSTERECTOMY N/A 4/25/2017    Performed by Deny CORONADO  Arun Thomas MD at [facility] OR    SALPINGECTOMY Bilateral 2015    KJB    SALPINGECTOMY-LAPAROSCOPIC Bilateral 5/19/2015    Performed by [facility]y Dias Jr[facility] at [facility] OR    SINUS SURGERY FUNCTIONAL ENDOSCOPIC WITH NAVIGATION Bilateral 4/3/2017    [facility]    SINUS SURGERY FUNCTIONAL ENDOSCOPIC WITH NAVIGATION, 86507, 67220, 74900, 56327 Bilateral 2/5/2015    [facility]    SPHENOIDECTOMY Bilateral 4/9/2019    Performed by Adin Burks III, MD at [facility] OR [address] [place] [address]    THYROPLASTY - Medialization laryngoplasty, cricothyroid subluxation, arytenoid repositioning Left 8/2/2016    Performed by Gary Null MD at [facility] OR [address] [place] [address]    TRANSPLANT-LUNG Bilateral 6/11/2014    Performed by Adolfo Huston MD at [facility] OR [address] [place] [address]       Review of patient's allergies indicates:   Allergen Reactions    Albuterol Palpitations    Colistin Anaphylaxis    Vancomycin analogues      Infusion reaction that does not resolve with slowing  Pt. States she can tolerate it when given with 50 mg Benadryl and ran over 3 hours    Neupogen [filgrastim] Other (See Comments)     Ostealgia after five daily doses of 300 mcg.      Bactrim [sulfamethoxazole-trimethoprim] Hives    Ceftazidime Hives     Pt stated can tolerate cefapine not ceftazidime    Ceftazidime     Dronabinol Other (See Comments)     Mental changes/hallucinations    Haldol [haloperidol lactate] Other (See Comments)     Seizure like activity    Nsaids (non-steroidal anti-inflammatory drug)      Cannot have due to lung transplant    Adhesive Rash     Cloth tape- please use tegaderm or paper tape    Aztreonam Rash    Ciprofloxacin Nausea And Vomiting     Projectile N/V, per patient.  Unwilling to retry therapy.       Medications:  Medications Prior to Admission   Medication Sig    amLODIPine (NORVASC) 10 MG tablet Take 1 tablet (10 mg total) by mouth once daily.     butalbital-acetaminophen-caffeine -40 mg (FIORICET, ESGIC) -40 mg per tablet Take 1 tablet by mouth every 6 (six) hours as needed for Pain.    calcium-vitamin D3 (OS-KATY 500 + D3) 500 mg(1,250mg) -200 unit per tablet Take 1 tablet by mouth 2 (two) times daily with meals.    dapsone 100 MG Tab Take 1 tablet (100 mg total) by mouth once daily.    diphenhydrAMINE (BENADRYL) 50 MG tablet Take 1 tablet (50 mg total) by mouth every 6 (six) hours as needed for Itching.    fexofenadine (ALLEGRA) 180 MG tablet Take 180 mg by mouth once daily.    fluconazole (DIFLUCAN) 150 MG Tab TAKE 1 TABLET BY MOUTH EVERY WEEK    fluticasone propionate (FLONASE) 50 mcg/actuation nasal spray INSERT 2 SPRAYS IN EACH NOSTRIL DAILY    folic acid (FOLVITE) 1 MG tablet Take 1 tablet (1,000 mcg total) by mouth once daily.    gabapentin (NEURONTIN) 300 MG capsule Take 1 capsule (300 mg total) by mouth 3 (three) times daily.    inhalation spacing device (treadalong) USE AS DIRECTED    levalbuterol (XOPENEX HFA) 45 mcg/actuation inhaler Inhale 1-2 puffs into the lungs.    levalbuterol (XOPENEX) 1.25 mg/3 mL nebulizer solution Take 3 mLs (1.25 mg total) by nebulization every 8 (eight) hours as needed for Wheezing or Shortness of Breath. Rescue    lipase-protease-amylase (CREON) 36,000-114,000- 180,000 unit CpDR Take 4 capsules by mouth 3 (three) times daily with meals. Take 3 with snacks as needed. (Patient taking differently: Take 5 capsules by mouth 3 (three) times daily with meals. )    LORazepam (ATIVAN) 2 MG Tab Take 1 tablet by mouth every 8 hours as needed for ANXIETY    magnesium oxide (MAG-OX) 400 mg (241.3 mg magnesium) tablet Take 1 tablet (400 mg total) by mouth 2 (two) times daily.    metoprolol tartrate (LOPRESSOR) 25 MG tablet Take 1 tablet (25 mg total) by mouth 2 (two) times daily.    mometasone-formoterol (DULERA) 100-5 mcg/actuation HFAA Inhale 1 puff into the lungs 2 (two) times daily. Controller     montelukast (SINGULAIR) 10 mg tablet Take 1 tablet (10 mg total) by mouth once daily.    morphine (MS CONTIN) 15 MG 12 hr tablet Take 1 tablet by mouth twice a day    multivit,min52-folic-vitK-cQ10 (AQUADEKS) 100-700-10 mcg-mcg-mg Cap cap 1 tab twice daily    multivitamin-minerals no.55 (CENTRUM FLAVOR BURST ADULT) Chew     mycophenolate (CELLCEPT) 250 mg Cap Take 2 capsules (500 mg total) by mouth 2 (two) times daily.    omeprazole (PRILOSEC) 40 MG capsule Take 40 mg by mouth every morning.    ondansetron (ZOFRAN) 8 MG tablet Take 1 tablet (8 mg total) by mouth every 12 (twelve) hours as needed for Nausea.    oxycodone (ROXICODONE) 5 MG immediate release tablet Take 10 mg by mouth every 4 (four) hours as needed for Pain.    oxyCODONE (ROXICODONE) 5 MG immediate release tablet take 1 tablet by mouth twice a day    polyethylene glycol (GLYCOLAX) 17 gram PwPk Take 17 g by mouth 2 (two) times daily.    predniSONE (DELTASONE) 5 MG tablet Take 1 tablet (5 mg total) by mouth once daily.    promethazine (PHENERGAN) 25 MG tablet Take 1 tablet (25 mg total) by mouth every 6 (six) hours as needed for Nausea.    QUEtiapine (SEROQUEL) 25 MG Tab Take 1 tablet by mouth in morning and 2 tablets at bedtime    sodium chloride 3% 3 % nebulizer solution Take 4 mLs by nebulization once daily.    tacrolimus (PROGRAF) 0.5 MG Cap Take 1 capsule (0.5 mg total) by mouth every 12 (twelve) hours.    tacrolimus (PROGRAF) 1 MG Cap Take 2 capsules (2 mg total) by mouth every 12 (twelve) hours. Daily doses: 2.5 mg twice daily.    tiZANidine (ZANAFLEX) 4 MG tablet TAKE 2 TABLETS BY MOUTH EVERY 6 HOURS AS NEEDED    tobramycin with nebulizer 300 mg/5 mL Nebu Take 300 mg by nebulization every 12 (twelve) hours.    topiramate (TOPAMAX) 25 MG tablet Take 1 tablet (25 mg total) by mouth 2 (two) times daily    venlafaxine (EFFEXOR-XR) 150 MG Cp24 Take 1 capsule (150 mg total) by mouth once daily.    venlafaxine (EFFEXOR-XR) 37.5 MG 24  hr capsule Take 1 capsule (37.5 mg total) by mouth once daily.     Antibiotics (From admission, onward)    Start     Stop Route Frequency Ordered    06/19/19 0900  sulfamethoxazole-trimethoprim 200-40 mg/5 ml suspension 20 mL      -- PER NG TUBE Every Mon, Wed, Fri 06/18/19 1411    06/19/19 0900  dapsone tablet 100 mg      -- OG Daily 06/18/19 1647    06/19/19 0500  vancomycin (VANCOCIN) 1250 mg in 5 % dextrose 250 mL IVPB      -- IV Every 12 hours (non-standard times) 06/18/19 1546    06/18/19 2100  mupirocin 2 % ointment 1 g      06/23 2059 Nasl 2 times daily 06/18/19 1411    06/18/19 1515  meropenem-0.9% sodium chloride 1 g/50 mL IVPB      -- IV Every 8 hours (non-standard times) 06/18/19 1411        Antifungals (From admission, onward)    Start     Stop Route Frequency Ordered    06/18/19 1515  fluconazole 40 mg/ml suspension 400 mg      -- PER NG TUBE Daily 06/18/19 1411        Antivirals (From admission, onward)        Stop Route Frequency     ganciclovir (CYTOVENE) injection      -- IV Every 12 hours (non-standard times)           Immunization History   Administered Date(s) Administered    Cytomegalovirus Immune Globulin 06/13/2014    DTP 08/23/1994, 08/16/1995    Hepatitis A, Adult 04/04/2018, 01/22/2019    Hepatitis B 08/10/2000, 11/20/2000    Hepatitis B, Pediatric/Adolescent 11/28/2001    Influenza 09/12/2018    Influenza - Quadrivalent - PF 10/04/2017, 09/06/2018    Influenza - Trivalent (ADULT) 11/28/2001, 01/22/2002, 12/10/2002, 11/26/2003, 10/19/2004, 11/15/2005, 11/11/2006, 10/15/2007, 10/28/2009, 11/03/2010, 10/19/2011    Influenza - Trivalent - PF (ADULT) 10/14/2008, 10/22/2012, 11/02/2016    Influenza A (H1N1) 2009 Monovalent - IM - PF 12/01/2009    MMR 08/23/1994, 08/16/1995    Meningococcal Conjugate (MCV4P) 10/24/2008    OPV 08/23/1994, 08/16/1995, 08/10/2000    Pneumococcal Conjugate - 13 Valent 10/02/2017    Pneumococcal Conjugate - 7 Valent 01/22/2002    Pneumococcal  Polysaccharide - 23 Valent 01/22/2002    Td (ADULT) 08/10/2000, 12/16/2010    Tdap 01/22/2019    Typhoid - ViCPs 04/06/2018       Family History     Problem Relation (Age of Onset)    Cancer Maternal Grandmother    Diabetes Maternal Grandfather    Drug abuse Maternal Grandfather    Hypertension Maternal Grandmother    Thrombocytopenia Maternal Grandfather        Social History     Socioeconomic History    Marital status: Single     Spouse name: Not on file    Number of children: Not on file    Years of education: Not on file    Highest education level: Not on file   Occupational History    Not on file   Social Needs    Financial resource strain: Not on file    Food insecurity:     Worry: Not on file     Inability: Not on file    Transportation needs:     Medical: Not on file     Non-medical: Not on file   Tobacco Use    Smoking status: Never Smoker    Smokeless tobacco: Never Used   Substance and Sexual Activity    Alcohol use: No     Comment: Has not had an alcoholic drink in more than 2 months.    Drug use: No    Sexual activity: Yes     Partners: Male     Birth control/protection: See Surgical Hx     Comment: current partner since Oct 2016   Lifestyle    Physical activity:     Days per week: Not on file     Minutes per session: Not on file    Stress: Not on file   Relationships    Social connections:     Talks on phone: Not on file     Gets together: Not on file     Attends Zoroastrianism service: Not on file     Active member of club or organization: Not on file     Attends meetings of clubs or organizations: Not on file     Relationship status: Not on file   Other Topics Concern    Not on file   Social History Narrative    Not on file     Review of Systems   Unable to perform ROS: Intubated     Objective:     Vital Signs (Most Recent):  Temp: 97.3 °F (36.3 °C) (06/18/19 2009)  Pulse: 92 (06/18/19 2009)  Resp: (!) 7 (06/18/19 2009)  BP: 109/61 (06/18/19 2000)  SpO2: 97 % (06/18/19 2009) Vital  Signs (24h Range):  Temp:  [91.8 °F (33.2 °C)-98 °F (36.7 °C)] 97.3 °F (36.3 °C)  Pulse:  [] 92  Resp:  [0-29] 7  SpO2:  [94 %-100 %] 97 %  BP: (103-115)/(59-75) 109/61  Arterial Line BP: (111-135)/(55-75) 115/59     Weight: 59 kg (129 lb 15.4 oz)  Body mass index is 24.56 kg/m².    Estimated Creatinine Clearance: 61.7 mL/min (based on SCr of 1.1 mg/dL).    Physical Exam   Constitutional: No distress.   HENT:   Head: Normocephalic and atraumatic.   Intubated. RIJ line    Pulmonary/Chest:   Symmetric chest rise.  Multiple chest tubes with serous fluid   Abdominal: Soft. She exhibits no distension. There is no tenderness.   Neurological:   No spontaneous movements   Skin: Skin is warm and dry. She is not diaphoretic. No erythema.   Vitals reviewed.      Significant Labs: All pertinent labs within the past 24 hours have been reviewed.    Significant Imaging: I have reviewed all pertinent imaging results/findings within the past 24 hours.

## 2019-06-19 NOTE — NURSING
Dr. Newby and Dr. Morin updated on most recent hourly ABG and lactic. New orders given to omari gómez settings the same of A/C PC 40%/ 8PEEP RR20 at this time. Dr. Newby notified of UOP decrease to 45 cc/hr, CT output total from each side and CVP increasing to 15flat. New h/h decreased to 8.1/24.9 after 750cc albumin previously given. Lactic trending down o 3.9. Will continue to monitor closely. No new orders given at this time.

## 2019-06-19 NOTE — ASSESSMENT & PLAN NOTE
29-year-old female with history of CF s/p bilateral sequential lung transplant 6/2014 c/b CARLOS EDUARDO and end stage lung disease, admitted 6/18/2019 for bilateral lung re-transplant (CMV D-/R+, basiliximab induction, on tacro/MMF/pred).  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  Patient with history of MRSA and Pseudomonas pneumonia - ID consulted for antibiotic recommendations.       Recommendations:  - Continue vancomycin, meropenem IV  - Follow-up donor lung cultures  - Prophylaxis per lung transplant protocol - on dapsone, fluconazole, ganciclovir

## 2019-06-19 NOTE — PLAN OF CARE
Problem: Adult Inpatient Plan of Care  Goal: Plan of Care Review  Recommendations  Recommendation/Intervention:   1. When able to extubate, ADAT to Regular with texture per SLP.   2. If unable to extubate, RD to provide TF recommendations.   3. TSU RD to provide post-transplant diet education prior to discharge.   RD to monitor.     Goals: Patient to receive nutrition by RD follow-up  Nutrition Goal Status: new    Full assessment completed, see RD Note 6/19/2019.

## 2019-06-19 NOTE — PROGRESS NOTES
Admit Note     Met with spouse, mother and mother in law to assess needs due pt being intubated. Patient is a 29 y.o. single female, admitted for her second lung txp. First txp preformed at [facility] Pt is s/p lung txp and currently under evaluation for second lung txp .    Patient admitted from home on 6/17/2019 .  At this time, patient presents as intubated.  At this time, patients caregiver presents as alert and oriented x 4, pleasant, good eye contact, well groomed, recall good, concentration/judgement good, average intelligence, calm, communicative, cooperative and asking and answering questions appropriately.     Household/Family Systems (reported by pt family)    Patient resides with patient's significant other, at [address] [place] [address].   Support system includes pt's mother Kim Ibarra, aunt Mariel Calero, and s/o Shawn Castrejon.  Patient does not have dependents that are need of being cared for.     Patients primary caregiver is Shawn Cash, patient's s/o, phone number [phone].  Confirmed patients contact information is [phone] (home);   Telephone Information:   Mobile [phone]     During admission, patient's caregiver plans to stay at home.  Confirmed patient and patients caregivers do have access to reliable transportation.    Cognitive Status/Learning (reported by pt family)    Patient reports reading ability as 8th grade and states patient does have difficulty with short-term memory.  Patient reports patient learns best by visual/auditory/hands on information.   Needed: No.   Highest education level: Grade School (0-8)    Vocation/Disability (reported by pt family)    Working for Income: No  If no, reason not working: Disability  Patient is disabled due to Cystic Fibrosis since 2004. Pt was taking classes part-time at [employer] in [place] but dropped out due to illness. Pt reports that she does not plan on returning.     Adherence (reported by pt  family)    Patient family reports a high level of adherence to patients health care regimen.  Adherence counseling and education provided. Family verbalizes understanding.    Substance Use (reported by pt family)    Family reports the following substance usage.    Tobacco: none, patient denies any use.   Alcohol: none, patient denies any use.  Illicit Drugs/Non-prescribed Medications: none, patient denies any use. Pt sees pain management clinic for prescribed pain meds. Pt does have a documented high threshold with pain medications/barbiturates/benzos.   Patient states clear understanding of the potential impact of substance use.  Substance abstinence/cessation counseling, education and resources provided and reviewed.     Services Utilizing/ADLS (reported by pt family)    Infusion Service: Prior to admission, patient utilizing? no; in past with [facility] [phone]/ [facility] [phone]   Home Health: Prior to admission, patient utilizing? no  DME: Prior to admission, yes O2 through [facility]  Pulmonary/Cardiac Rehab: Prior to admission, no - pt was attempting to go to [facility], however pt was unable to schedule an appointment  Dialysis:  Prior to admission, no  Transplant Specialty Pharmacy:  Prior to admission, yes; [facility].     Prior to admission, patient was independent with ADLS and was not driving.  Patient is not able to care for self at this time due to compromised medical condition (as documented in medical record) and physical weakness.  Patient indicates a willingness to care for self once medically cleared to do so.    Insurance/Medications(reported by pt family)    Insured by   Payor/Plan Subscr  Sex Relation Sub. Ins. ID Effective Group Num   1. UNITED [facility]* ELOISE WILLIAM* 1990 Female Self [id number]                                    [address] [address]   2. MEDICAID - * ELOISE WILLIAM* 1990 Female  [id number] [facility]                                   [address] [address]      Primary Insurance (for UNOS reporting): Public Insurance - Medicaid-[facility]  Secondary Insurance (for UNOS reporting): None    Patient reports patient is able to obtain and afford medications at this time and at time of discharge.      Living Will/Healthcare Power of (reported by pt family)    Patient states patient has a LW and/or HCPA on file. Pt HCPA is s/o Shawn Castrejon ([phone]).      Coping/Mental Health(reported by pt family)    Patient has a well documented history mental health issues that are currently being managed with medications through an [facility]iatrist. SW unable to assess pt at this time due to intubation. SW met with family who reports coping appropriately at this time.   Discharge Planning    At time of discharge, patient plans to return to patient's home under the care of s/o.  Patients significant other will transport patient.  Per rounds today, expected discharge date has not been medically determined at this time. Patient and patients caregiver  verbalize understanding and are involved in treatment planning and discharge process.    Additional Concerns    Patient is being followed for needs, education, resources, information, emotional support, supportive counseling, and for supportive and skilled discharge plan of care.  providing ongoing psychosocial support, education, resources and d/c planning as needed.  SW remains available.  provided resource list, patient choice, psychosocial and supportive counseling, resources, education, assistance and discharge planning with patient and caregiver involvement, ongoing SW availability and services as appropriate.

## 2019-06-19 NOTE — PROGRESS NOTES
[facility]  Cardiothoracic Surgery  Progress Note    Patient Name: Juanita Ibarra  MRN: [id number]  Admission Date: 6/17/2019  Hospital Length of Stay: 1 days  Code Status: Full Code   Attending Physician: Jonathan Newby MD   Referring Provider: Francisca Morin, Tiffanie  Principal Problem:S/P lung transplant    Subjective:     Post-Op Info:  Procedure(s) (LRB):  REDO BILATERAL LUNG TRANSPLANT; CLAMSHELL (Bilateral)   1 Day Post-Op     Interval History: No acute events overnight. Chest tube output 80-120ml/hour. Received 1u pRBCs, 1plt, 1 cryo. Only on fentanyl, propofol. Minimal vent settings.     Medications:  Continuous Infusions:   dexmedetomidine (PRECEDEX) infusion      dextrose 5 % and 0.45 % NaCl with KCl 20 mEq 5 mL/hr at 06/18/19 1556    epinephrine infusion Stopped (06/18/19 1515)    fentanyl 25 mL/hr at 06/19/19 0600    insulin (HUMAN R) infusion (adults) Stopped (06/19/19 0200)    nicardipine Stopped (06/18/19 2200)    propofol 50 mcg/kg/min (06/19/19 0600)     Scheduled Meds:   basiliximab (SIMULECT) chemo infusion  20 mg Intravenous Q96H    calcium gluconate IVPB  1,000 mg Intravenous Once    chlorhexidine  10 mL Mouth/Throat BID    dapsone  100 mg Per OG tube Daily    famotidine (PF)  20 mg Intravenous BID    fluconazole 40 mg/ml  400 mg Per NG tube Daily    ganciclovir (CYTOVENE) IVPB  5 mg/kg Intravenous Q12H    meropenem (MERREM) IVPB  1 g Intravenous Q8H    methylPREDNISolone (SOLU-Medrol) IVPB (doses > 250 mg)   Intravenous Daily    Followed by    [START ON 6/20/2019] methylPREDNISolone (SOLU-Medrol) IVPB (doses > 250 mg)   Intravenous Daily    Followed by    [START ON 6/21/2019] methylPREDNISolone sodium succinate  2 mg/kg Intravenous Daily    mupirocin  1 g Nasal BID    [START ON 6/20/2019] mycophenolate mofetil  500 mg Per NG tube BID    polyethylene glycol  17 g Oral Daily    sodium bicarbonate  50 mEq Intravenous Once    [START ON  6/20/2019] tacrolimus  0.5 mg Per NG tube BID    vancomycin (VANCOCIN) IVPB  1,250 mg Intravenous Q12H     PRN Meds:sodium chloride, sodium chloride, sodium chloride, acetaminophen, Dextrose 10% Bolus, Dextrose 10% Bolus, diphenhydrAMINE, HYDROmorphone, lactulose, levalbuterol, magnesium sulfate IVPB, metoclopramide HCl, ondansetron, oxyCODONE, oxyCODONE, potassium chloride in water **AND** potassium chloride in water **AND** potassium chloride in water, sodium phosphate IVPB, sodium phosphate IVPB, sodium phosphate IVPB     Objective:     Vital Signs (Most Recent):  Temp: 99.9 °F (37.7 °C) (06/19/19 0718)  Pulse: 98 (06/19/19 0718)  Resp: (!) 9 (06/19/19 0718)  BP: 104/68 (06/19/19 0600)  SpO2: 99 % (06/19/19 0718) Vital Signs (24h Range):  Temp:  [91.8 °F (33.2 °C)-100 °F (37.8 °C)] 99.9 °F (37.7 °C)  Pulse:  [] 98  Resp:  [0-29] 9  SpO2:  [94 %-100 %] 99 %  BP: ()/(58-79) 104/68  Arterial Line BP: ()/(55-83) 114/68     Weight: 64.2 kg (141 lb 8.6 oz)  Body mass index is 26.74 kg/m².    SpO2: 99 %  O2 Device (Oxygen Therapy): ventilator    Intake/Output - Last 3 Shifts       06/17 0700 - 06/18 0659 06/18 0700 - 06/19 0659 06/19 0700 - 06/20 0659    P.O. 0      I.V. (mL/kg)  4582 (71.4)     Blood  3289     IV Piggyback  100     Total Intake(mL/kg) 0 (0) 7971 (124.2)     Urine (mL/kg/hr) 75 1475 (1)     Stool 0      Chest Tube  1489     Total Output 75 2964     Net -75 +5007            Stool Occurrence 0 x            Lines/Drains/Airways     Central Venous Catheter Line                 Introducer right internal jugular -- days         Percutaneous Central Line Insertion/Assessment - triple lumen  right internal jugular -- days         Port A Cath Single Lumen 06/24/14 1200 right subclavian 1820 days         Percutaneous Central Line Insertion/Assessment - double lumen  06/18/19 0510 1 day          Drain                 NG/OG Tube 06/18/19 Center mouth 1 day         Urethral Catheter 06/18/19 0504  Non-latex;Straight-tip;Temperature probe 14 Fr. 1 day         Y Chest Tube 1 and 2 06/18/19 1151 1 Right Mediastinal 19 Fr. 2 Right Pleural 19 Fr. less than 1 day         Y Chest Tube 3 and 4 06/18/19 1151 3 Left Pleural 19 Fr. 4 Left Mediastinal 19 Fr. less than 1 day          Airway                 Airway - Non-Surgical 06/18/19 0626 Endotracheal Tube 1 day          Arterial Line                 Arterial Line 06/18/19 0501 Left Brachial 1 day          Peripheral Intravenous Line                 Peripheral IV - Single Lumen 06/18/19 18 G Hand 1 day         Peripheral IV - Single Lumen 06/18/19 18 G Wrist 1 day                Physical Exam   Constitutional: She appears well-developed and well-nourished.   HENT:   Head: Normocephalic and atraumatic.   Cardiovascular: Normal rate and regular rhythm.   Clamshell incision with dressing in place, clean and dry.   Pulmonary/Chest:   Intubated  Vent Mode: A/C  Oxygen Concentration (%):  (40-42) 41  Resp Rate Total:  (18 br/min-53 br/min) 19 br/min  PEEP/CPAP:  (8 cmH20) 8 cmH20  Mean Airway Pressure:  (13 umS11-91 cmH20) 14 cmH20     Abdominal: Soft. She exhibits no distension.   Neurological: She is alert.   Following commands   Skin: Skin is warm and dry.   Nursing note and vitals reviewed.      Significant Labs:  ABGs:   Recent Labs   Lab 06/19/19  0604   PH 7.479*   PCO2 29.1*   PO2 174*   HCO3 21.7*   POCSATURATED 100   BE -2     CBC:   Recent Labs   Lab 06/19/19  0500 06/19/19  0604   WBC 8.20  --    RBC 2.77*  --    HGB 7.7*  --    HCT 22.6* 18*   PLT 78*  --    MCV 82  --    MCH 27.8  --    MCHC 34.1  --      CMP:   Recent Labs   Lab 06/17/19  1005  06/19/19  0500   *   < > 133*   CALCIUM 10.2   < > 7.9*   ALBUMIN 3.5  --   --    PROT 8.1  --   --       < > 140   K 3.9   < > 4.3  4.3   CO2 31*   < > 22*   CL 99   < > 109   BUN 16   < > 18   CREATININE 0.9   < > 0.8   ALKPHOS 488*  --   --    ALT 64*  --   --    AST 52*  --   --    BILITOT 0.2  --   --      < > = values in this interval not displayed.       Significant Diagnostics:  CXR: I have reviewed all pertinent results/findings within the past 24 hours. Bilateral patchy opacities.    Assessment/Plan:     * S/P lung transplant  30yo w/hx of CF s/p lung transplant now with redo lung transplant on 6/18    Neuro:   - hx of heavy narcotic use at home, continue fentanyl gtt with dilaudid PRN for breakthrough  - consult pain management, appreciate recs    Pulmonary:   - intubated on minimal settings, lung transplant managing vent, appreciate assistance  - daily CXR while CT are in place    Cardiac:  -   - continue anabell  - continue CVC  - CT output 1489/24hrs, continue to wall suction, will keep today    Renal:   - monitor Is and Os  - BUN/Cr   BUN, Bld   Date Value Ref Range Status   06/19/2019 18 6 - 20 mg/dL Final   06/19/2019 18 6 - 20 mg/dL Final     Creatinine   Date Value Ref Range Status   06/19/2019 0.8 0.5 - 1.4 mg/dL Final   06/19/2019 0.8 0.5 - 1.4 mg/dL Final     - trend BMP daily  - continue saleh    Infectious Disease:   - WBC trending up, monitor daily  - simulect, solumedrol taper, mycophenolate, tacro  - dapsone, fluconazole, ganciclovir, meropenem for prophylaxis  - ID following for transplant prophylaxis    Hematology/Oncology:  - H/H slowly trending down, monitor q4  - received 1u pRBCs with appropriate response, 1 plt, 1 cryo on 6/18    Endocrine:  - insulin gtt vs SSI as needed  - endocrinology following    Fluids/Electrolytes/Nutrition/GI:   - replace electrolytes as needed to keep K>4, Mg>2.5  - NPO  - daily BMP  - bowel reg  - famotidine for GI prophylaxis    Dispo:  - continue care in the ICU, transition primary care to lung transplant team          Jacqueline Douglass MD  Cardiothoracic Surgery  [facility]

## 2019-06-19 NOTE — TELEPHONE ENCOUNTER
6/17/19 - Received call from Dr. Morin to have HLA run virtual crossmatch against Donor ID [id number].    Spoke with Hector in HLA who will run crossmatch and notify Dr. Morin of result.    14:30 - Per Dr. Morin, call patient in for lung transplant.    Patient notified of organ offer by LIANA Chaney RN.  Pt accepts.    14:45 - Notified the following that patient being brought in for bilateral lung transplant, Keyonna is the surgeon, no OR times yet:  Dionna in Admit (admit to OBS);  Paxton - House Supervisor; Winter - Bed Supervisor; Tanner - ICU CN; Radha - SUZY CN; Edwardo - OR desk; Hector - HLA.    Per Winter, Bed Supervisor, patient should arrive to admit office after 16:00.  Sylvia instructed patient to arrive around 16:00.    Notified Dr. Morin of patient's ETA.    18:00:  Received call from Ezio Astorga in procurement stating that OR time for the recipient will be 02:00 on 6/18/19.  Notified the following of the OR time:  Patient (instructed her to remain NPO); Dr. Morin; Jaron - House Supervisor; Jermain - ICU CN; Arlene ALMONTE; Shama - OR desk; Dr. Garcia - CTS Resident.

## 2019-06-19 NOTE — SUBJECTIVE & OBJECTIVE
Interval History: No acute events overnight. Chest tube output 80-120ml/hour. Received 1u pRBCs, 1plt, 1 cryo. Only on fentanyl, propofol. Minimal vent settings.     Medications:  Continuous Infusions:   dexmedetomidine (PRECEDEX) infusion      dextrose 5 % and 0.45 % NaCl with KCl 20 mEq 5 mL/hr at 06/18/19 1556    epinephrine infusion Stopped (06/18/19 1515)    fentanyl 25 mL/hr at 06/19/19 0600    insulin (HUMAN R) infusion (adults) Stopped (06/19/19 0200)    nicardipine Stopped (06/18/19 2200)    propofol 50 mcg/kg/min (06/19/19 0600)     Scheduled Meds:   basiliximab (SIMULECT) chemo infusion  20 mg Intravenous Q96H    calcium gluconate IVPB  1,000 mg Intravenous Once    chlorhexidine  10 mL Mouth/Throat BID    dapsone  100 mg Per OG tube Daily    famotidine (PF)  20 mg Intravenous BID    fluconazole 40 mg/ml  400 mg Per NG tube Daily    ganciclovir (CYTOVENE) IVPB  5 mg/kg Intravenous Q12H    meropenem (MERREM) IVPB  1 g Intravenous Q8H    methylPREDNISolone (SOLU-Medrol) IVPB (doses > 250 mg)   Intravenous Daily    Followed by    [START ON 6/20/2019] methylPREDNISolone (SOLU-Medrol) IVPB (doses > 250 mg)   Intravenous Daily    Followed by    [START ON 6/21/2019] methylPREDNISolone sodium succinate  2 mg/kg Intravenous Daily    mupirocin  1 g Nasal BID    [START ON 6/20/2019] mycophenolate mofetil  500 mg Per NG tube BID    polyethylene glycol  17 g Oral Daily    sodium bicarbonate  50 mEq Intravenous Once    [START ON 6/20/2019] tacrolimus  0.5 mg Per NG tube BID    vancomycin (VANCOCIN) IVPB  1,250 mg Intravenous Q12H     PRN Meds:sodium chloride, sodium chloride, sodium chloride, acetaminophen, Dextrose 10% Bolus, Dextrose 10% Bolus, diphenhydrAMINE, HYDROmorphone, lactulose, levalbuterol, magnesium sulfate IVPB, metoclopramide HCl, ondansetron, oxyCODONE, oxyCODONE, potassium chloride in water **AND** potassium chloride in water **AND** potassium chloride in water, sodium phosphate  IVPB, sodium phosphate IVPB, sodium phosphate IVPB     Objective:     Vital Signs (Most Recent):  Temp: 99.9 °F (37.7 °C) (06/19/19 0718)  Pulse: 98 (06/19/19 0718)  Resp: (!) 9 (06/19/19 0718)  BP: 104/68 (06/19/19 0600)  SpO2: 99 % (06/19/19 0718) Vital Signs (24h Range):  Temp:  [91.8 °F (33.2 °C)-100 °F (37.8 °C)] 99.9 °F (37.7 °C)  Pulse:  [] 98  Resp:  [0-29] 9  SpO2:  [94 %-100 %] 99 %  BP: ()/(58-79) 104/68  Arterial Line BP: ()/(55-83) 114/68     Weight: 64.2 kg (141 lb 8.6 oz)  Body mass index is 26.74 kg/m².    SpO2: 99 %  O2 Device (Oxygen Therapy): ventilator    Intake/Output - Last 3 Shifts       06/17 0700 - 06/18 0659 06/18 0700 - 06/19 0659 06/19 0700 - 06/20 0659    P.O. 0      I.V. (mL/kg)  4582 (71.4)     Blood  3289     IV Piggyback  100     Total Intake(mL/kg) 0 (0) 7971 (124.2)     Urine (mL/kg/hr) 75 1475 (1)     Stool 0      Chest Tube  1489     Total Output 75 2964     Net -75 +5007            Stool Occurrence 0 x            Lines/Drains/Airways     Central Venous Catheter Line                 Introducer right internal jugular -- days         Percutaneous Central Line Insertion/Assessment - triple lumen  right internal jugular -- days         Port A Cath Single Lumen 06/24/14 1200 right subclavian 1820 days         Percutaneous Central Line Insertion/Assessment - double lumen  06/18/19 0510 1 day          Drain                 NG/OG Tube 06/18/19 Center mouth 1 day         Urethral Catheter 06/18/19 0504 Non-latex;Straight-tip;Temperature probe 14 Fr. 1 day         Y Chest Tube 1 and 2 06/18/19 1151 1 Right Mediastinal 19 Fr. 2 Right Pleural 19 Fr. less than 1 day         Y Chest Tube 3 and 4 06/18/19 1151 3 Left Pleural 19 Fr. 4 Left Mediastinal 19 Fr. less than 1 day          Airway                 Airway - Non-Surgical 06/18/19 0626 Endotracheal Tube 1 day          Arterial Line                 Arterial Line 06/18/19 0501 Left Brachial 1 day          Peripheral  Intravenous Line                 Peripheral IV - Single Lumen 06/18/19 18 G Hand 1 day         Peripheral IV - Single Lumen 06/18/19 18 G Wrist 1 day                Physical Exam   Constitutional: She appears well-developed and well-nourished.   HENT:   Head: Normocephalic and atraumatic.   Cardiovascular: Normal rate and regular rhythm.   Clamshell incision with dressing in place, clean and dry.   Pulmonary/Chest:   Intubated  Vent Mode: A/C  Oxygen Concentration (%):  (40-42) 41  Resp Rate Total:  (18 br/min-53 br/min) 19 br/min  PEEP/CPAP:  (8 cmH20) 8 cmH20  Mean Airway Pressure:  (13 poZ72-03 cmH20) 14 cmH20     Abdominal: Soft. She exhibits no distension.   Neurological: She is alert.   Following commands   Skin: Skin is warm and dry.   Nursing note and vitals reviewed.      Significant Labs:  ABGs:   Recent Labs   Lab 06/19/19  0604   PH 7.479*   PCO2 29.1*   PO2 174*   HCO3 21.7*   POCSATURATED 100   BE -2     CBC:   Recent Labs   Lab 06/19/19  0500 06/19/19  0604   WBC 8.20  --    RBC 2.77*  --    HGB 7.7*  --    HCT 22.6* 18*   PLT 78*  --    MCV 82  --    MCH 27.8  --    MCHC 34.1  --      CMP:   Recent Labs   Lab 06/17/19  1005  06/19/19  0500   *   < > 133*   CALCIUM 10.2   < > 7.9*   ALBUMIN 3.5  --   --    PROT 8.1  --   --       < > 140   K 3.9   < > 4.3  4.3   CO2 31*   < > 22*   CL 99   < > 109   BUN 16   < > 18   CREATININE 0.9   < > 0.8   ALKPHOS 488*  --   --    ALT 64*  --   --    AST 52*  --   --    BILITOT 0.2  --   --     < > = values in this interval not displayed.       Significant Diagnostics:  CXR: I have reviewed all pertinent results/findings within the past 24 hours. Bilateral patchy opacities.

## 2019-06-19 NOTE — ASSESSMENT & PLAN NOTE
30yo w/hx of CF s/p lung transplant now with redo lung transplant on 6/18    Neuro:   - hx of heavy narcotic use at home, continue fentanyl gtt with dilaudid PRN for breakthrough  - consult pain management, appreciate recs    Pulmonary:   - intubated on minimal settings, lung transplant managing vent, appreciate assistance  - daily CXR while CT are in place    Cardiac:  -   - continue anabell  - continue CVC  - CT output 1489/24hrs, continue to wall suction, will keep today    Renal:   - monitor Is and Os  - BUN/Cr   BUN, Bld   Date Value Ref Range Status   06/19/2019 18 6 - 20 mg/dL Final   06/19/2019 18 6 - 20 mg/dL Final     Creatinine   Date Value Ref Range Status   06/19/2019 0.8 0.5 - 1.4 mg/dL Final   06/19/2019 0.8 0.5 - 1.4 mg/dL Final     - trend BMP daily  - continue saleh    Infectious Disease:   - WBC trending up, monitor daily  - simulect, solumedrol taper, mycophenolate, tacro  - dapsone, fluconazole, ganciclovir, meropenem for prophylaxis  - ID following for transplant prophylaxis    Hematology/Oncology:  - H/H slowly trending down, monitor q4  - received 1u pRBCs with appropriate response, 1 plt, 1 cryo on 6/18    Endocrine:  - insulin gtt vs SSI as needed  - endocrinology following    Fluids/Electrolytes/Nutrition/GI:   - replace electrolytes as needed to keep K>4, Mg>2.5  - NPO  - daily BMP  - bowel reg  - famotidine for GI prophylaxis    Dispo:  - continue care in the ICU, transition primary care to lung transplant team

## 2019-06-19 NOTE — ASSESSMENT & PLAN NOTE
POD#1 s/p bilateral lung transplant (re-transplant) for CARLOS EDUARDO. 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 Remains PGD 0. Will continue lung protective ventilation until pain regimen post-extubation can be established. LUT primary and CTS to manage chest tubes. Continue immunosuppression and ppx per protocol. Daily ABG and CXR.

## 2019-06-19 NOTE — PROGRESS NOTES
Received order from Dr. Morin to discontinue Bactrim due to patient allergy. Dapsone will be continued for PCP prophylaxis as ordered previously.

## 2019-06-19 NOTE — ASSESSMENT & PLAN NOTE
CMV D-/R+. Continue OIP with ganciclovir, fluconazole, and dapsone. On Merrem/Vanc for routine surgical prophylaxis based on previous sensitivities. ID following appreciate recs.

## 2019-06-19 NOTE — PROGRESS NOTES
Dr. Douglass notified of pt's blood pressure dropping, resulting lab values, along with a steady chest tube output of 50 ml/hr from both the left and the right (totaling 100 ml/hr). Orders received to transfuse 1 unit of PBRCs slowly. Will continue to monitor pt closely. See flowsheets for specific details.

## 2019-06-19 NOTE — PROGRESS NOTES
[facility]  Critical Care - Surgery  Progress Note    Patient Name: Juanita Ibarra  MRN: [id number]  Admission Date: 6/17/2019  Hospital Length of Stay: 1 days  Code Status: Full Code  Attending Provider: Francisca Morin DO  Primary Care Provider: Primary Doctor No   Principal Problem: S/P lung transplant    Subjective:     Hospital/ICU Course:  No notes on file    Interval History/Significant Events: No acute events overnight. Chest tube output 80-120ml/hour. Received 1u pRBCs, 1plt, 1 cryo. Only on fentanyl, propofol. Minimal vent settings.     Follow-up For: Procedure(s) (LRB):  REDO BILATERAL LUNG TRANSPLANT; CLAMSHELL (Bilateral)    Post-Operative Day: 1 Day Post-Op    Objective:     Vital Signs (Most Recent):  Temp: 99.9 °F (37.7 °C) (06/19/19 0915)  Pulse: 90 (06/19/19 0915)  Resp: (!) 6 (06/19/19 0915)  BP: (!) 88/50 (06/19/19 0900)  SpO2: 99 % (06/19/19 0915) Vital Signs (24h Range):  Temp:  [91.8 °F (33.2 °C)-100 °F (37.8 °C)] 99.9 °F (37.7 °C)  Pulse:  [] 90  Resp:  [0-29] 6  SpO2:  [94 %-100 %] 99 %  BP: ()/(50-79) 88/50  Arterial Line BP: ()/(55-84) 123/75     Weight: 64.2 kg (141 lb 8.6 oz)  Body mass index is 26.74 kg/m².      Intake/Output Summary (Last 24 hours) at 6/19/2019 1030  Last data filed at 6/19/2019 1000  Gross per 24 hour   Intake 5921 ml   Output 3429 ml   Net 2492 ml       Physical Exam   Constitutional: She appears well-developed and well-nourished.   Eyes: Pupils are equal, round, and reactive to light.   Neck: No JVD present.   Cardiovascular: Normal rate, regular rhythm and normal heart sounds.   Sternal incision site bandaged appropriately, C/D/I  Right chest tube with ss output  Left CT with ss output.   R IJ TLC   Pulmonary/Chest: Breath sounds normal.   Intubated   Abdominal: Soft. Bowel sounds are normal. She exhibits no distension.   Nursing note and vitals reviewed.      Vents:  Vent Mode: A/C (06/19/19 0915)  Set Rate: 14  bmp (06/19/19 0915)  PEEP/CPAP: 8 cmH20 (06/19/19 0915)  Oxygen Concentration (%): 40 (06/19/19 0915)  Peak Airway Pressure: 28 cmH2O (06/19/19 0915)  Plateau Pressure: 26 cmH20 (06/19/19 0915)  Total Ve: 6.13 mL (06/19/19 0915)  F/VT Ratio<105 (RSBI): (!) 20.98 (06/19/19 0915)    Lines/Drains/Airways     Central Venous Catheter Line                 Introducer right internal jugular -- days         Percutaneous Central Line Insertion/Assessment - triple lumen  right internal jugular -- days         Port A Cath Single Lumen 06/24/14 1200 right subclavian 1820 days         Percutaneous Central Line Insertion/Assessment - double lumen  06/18/19 0510 1 day          Drain                 NG/OG Tube 06/18/19 Center mouth 1 day         Urethral Catheter 06/18/19 0504 Non-latex;Straight-tip;Temperature probe 14 Fr. 1 day         Y Chest Tube 1 and 2 06/18/19 1151 1 Right Mediastinal 19 Fr. 2 Right Pleural 19 Fr. less than 1 day         Y Chest Tube 3 and 4 06/18/19 1151 3 Left Pleural 19 Fr. 4 Left Mediastinal 19 Fr. less than 1 day          Airway                 Airway - Non-Surgical 06/18/19 0626 Endotracheal Tube 1 day          Arterial Line                 Arterial Line 06/18/19 0501 Left Brachial 1 day          Peripheral Intravenous Line                 Peripheral IV - Single Lumen 06/18/19 18 G Hand 1 day         Peripheral IV - Single Lumen 06/18/19 18 G Wrist 1 day                Significant Labs:    CBC/Anemia Profile:  Recent Labs   Lab 06/18/19  2100  06/19/19  0100  06/19/19  0500 06/19/19  0604 06/19/19  0908   WBC 10.00  --  7.19  --  8.20  --   --    HGB 7.2*  --  8.1*  --  7.7*  --   --    HCT 21.9*   < > 24.3*   < > 22.6* 18* 25*   PLT 56*  --  50*  --  78*  --   --    MCV 83  --  84  --  82  --   --    RDW 16.5*  --  15.9*  --  15.9*  --   --     < > = values in this interval not displayed.        Chemistries:  Recent Labs   Lab 06/18/19  1411 06/18/19  1735  06/18/19  2100 06/19/19  0100 06/19/19  0500     141  --  140 140 140   K 4.0  4.0  4.0 3.4*  --  4.3  4.3 4.3  4.3 4.3  4.3    104  --  108 110 109   CO2 19* 23  --  20* 21* 22*   BUN 22* 21*  --  20 18 18   CREATININE 0.9 1.1  --  1.0 0.8 0.8   CALCIUM 8.8 8.4*  --  8.1* 7.5* 7.9*   MG 1.5* 1.4*  --   --   --  2.6   PHOS 4.9*  --    < > 1.7* 3.9 3.0    < > = values in this interval not displayed.       ABGs:   Recent Labs   Lab 06/19/19  0908   PH 7.376   PCO2 42.6   HCO3 25.0   POCSATURATED 99   BE 0     Lactic Acid: No results for input(s): LACTATE in the last 48 hours.    Significant Imaging:  I have reviewed and interpreted all pertinent imaging results/findings within the past 24 hours.    Assessment/Plan:     * S/P lung transplant  kati Ibarra is a 29 y.o. female with CF s/p BL Lung Transplant June 2014 complicated by bronchiolitis obliterans and left vocal chord paralysis s/p L arytenopexy/laryngoplasty(implant) and cricoid subluxation. In addition has HTN, Anxiety, chronic opioid use. Does not have DM. She is s/p bilateral lung transplant 6/18/2019. A clamshell thoracotomy was used. Transported to SICU for recovery.     Neuro:   - Sedation:Propofol and fentanyl  - Hx/o chronic opioid use in post-op patient    - Propofol ggt with PRN dilauded while sedated   - Consult acute pain service if needed for assistance with post-op pain control   - Consider bilateral TARA blocks     Pulmonary:   - Intubated:  Vent Mode: A/C  Oxygen Concentration (%):  [40-42] 40  Resp Rate Total:  [14 br/min-53 br/min] 21 br/min  PEEP/CPAP:  [8 cmH20] 8 cmH20  Mean Airway Pressure:  [12 wyK72-48 cmH20] 14 cmH20  - Wean vent as tolerated.   - ABGs PRN  - Daily CXR while intubated   - Management of vent per Lung Transplant Team     Cardiac:  - Drips: Cardene for MAP < 80  - Keep MAPs >65  - HTN   -Hold home anti-hypertensives in acute setting   - Monitor chest tube output and character    Renal:   - Trend BUN/Cr  - Monitor UOP     ID:   - Afebrile,  trend WBC  - Abx:Meropenem, IV Vanc, fluconazole  - Give benadryll prior to Vanc administration to prevent red man syndrome  - Will continue to monitor for signs of infection   - ID consulted, f/u recs    Immuno:  - Cellcept, tacro, steroid taper  - Immunomodulatory drugs per CTS  - Basiliximab q4 days x2 doses     Hem:  - Trend Hgb post-op  - No signs of active bleeding     Endocrine:  - Insulin gtt   - Monitor BG     FEN/GI:   - NPO   - Replace lytes PRN      PPx:  - Asa       Dispo:  - Continue ICU Care  - Vent management per lung transplant      Primary:   CTS        Critical care was time spent personally by me on the following activities: development of treatment plan with patient or surrogate and bedside caregivers, discussions with consultants, evaluation of patient's response to treatment, examination of patient, ordering and performing treatments and interventions, ordering and review of laboratory studies, ordering and review of radiographic studies, pulse oximetry, re-evaluation of patient's condition.  This critical care time did not overlap with that of any other provider or involve time for any procedures.     Ashok Boyle MD  Critical Care - Surgery  [facility]

## 2019-06-19 NOTE — PROGRESS NOTES
"[facility]  Endocrinology  Progress Note    Admit Date: 6/17/2019     Reason for Consult: Management of  Hyperglycemia and CF related pancreatic insufficiency.     Surgical Procedure and Date: lung transplant in 2014; 6/18/19    HPI:   Patient is a 29 y.o. female with a diagnosis of CF, HTN, migraine headache, and osteopenia. 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 No personal history of DM. Endocrinology consulted for BG management.     Lab Results   Component Value Date    HGBA1C 4.7 01/09/2019                 Interval HPI:   Overnight events: Remains in ICU, intubated. NAEON. On insulin infusion rates 1.7-5.6 u/hr then suspended with BG of 69.   Eating:   NPO  Nausea: No  Hypoglycemia and intervention: Yes d10 bolus x2.   Fever: No  TPN and/or TF: No      /68 (BP Location: Right arm, Patient Position: Lying)   Pulse 90   Temp 99.9 °F (37.7 °C)   Resp (!) 6   Ht 5' 1" (1.549 m)   Wt 64.2 kg (141 lb 8.6 oz)   LMP 10/02/2016   SpO2 99%   Breastfeeding? No   BMI 26.74 kg/m²      Labs Reviewed and Include    Recent Labs   Lab 06/19/19  0500   *   CALCIUM 7.9*      K 4.3  4.3   CO2 22*      BUN 18   CREATININE 0.8     Lab Results   Component Value Date    WBC 8.20 06/19/2019    HGB 7.7 (L) 06/19/2019    HCT 18 (LL) 06/19/2019    MCV 82 06/19/2019    PLT 78 (L) 06/19/2019     No results for input(s): TSH, FREET4 in the last 168 hours.  Lab Results   Component Value Date    HGBA1C 4.7 01/09/2019       Nutritional status:   Body mass index is 26.74 kg/m².  Lab Results   Component Value Date    ALBUMIN 3.5 06/17/2019    ALBUMIN 3.2 (L) 06/10/2019    ALBUMIN 3.3 (L) 06/06/2019     Lab Results   Component Value Date    PREALBUMIN 15 (L) 05/29/2014    PREALBUMIN 11 (L) 05/09/2014    PREALBUMIN 18 (L) 03/19/2014       Estimated Creatinine Clearance: 88.3 mL/min (based on SCr of 0.8 mg/dL).    Accu-Checks  Recent Labs "     06/19/19  0002 06/19/19  0056 06/19/19  0159 06/19/19  0230 06/19/19  0303 06/19/19  0409 06/19/19  0502 06/19/19  0559 06/19/19  0809 06/19/19  0858   POCTGLUCOSE 145* 133* 69* 78 214* 121* 139* 123* 155* 132*       Current Medications and/or Treatments Impacting Glycemic Control  Immunotherapy:    Immunosuppressants         Stop Route Frequency     mycophenolate mofetil 200 mg/mL suspension 500 mg      -- PER NG TUBE 2 times daily     tacrolimus (PROGRAF) 1 mg/mL oral syringe      -- PER NG TUBE 2 times daily     basiliximab (SIMULECT) 20 mg in dextrose 5 % 50 mL chemo infusion      06/26 1514 IV Every 96 hours        Steroids:   Hormones (From admission, onward)    Start     Stop Route Frequency Ordered    06/21/19 0900  methylPREDNISolone sodium succinate injection 118 mg  (methylprednisolone panel)      06/22 0859 IV Daily 06/18/19 1411    06/20/19 0900  methylPREDNISolone sodium succinate (SOLU-MEDROL) 177 mg in dextrose 5 % 100 mL IVPB  (methylprednisolone panel)      06/21 0859 IV Daily 06/18/19 1411    06/19/19 0900  methylPREDNISolone sodium succinate (SOLU-MEDROL) 295 mg in dextrose 5 % 100 mL IVPB  (methylprednisolone panel)      06/20 0859 IV Daily 06/18/19 1411        Pressors:    Autonomic Drugs (From admission, onward)    Start     Stop Route Frequency Ordered    06/18/19 1515  EPINEPHrine (ADRENALIN) 5 mg in sodium chloride 0.9% 250 mL infusion     Question Answer Comment   Titrate by: (in mcg/kg/min) 0.05    Titrate interval: (in minutes) 5    Titrate to maintain: (SBP or MAP or Cardiac Index) MAP    Greater than: (in mmHg) 60    Cardiac index greater than: (in L/min) 2.2    Maximum dose: (in mcg/kg/min) 2        -- IV Continuous 06/18/19 1411        Hyperglycemia/Diabetes Medications:   Antihyperglycemics (From admission, onward)    Start     Stop Route Frequency Ordered    06/18/19 1800  insulin regular (Humulin R) 100 Units in sodium chloride 0.9% 100 mL infusion     Question:  Insulin Rate  Adjustment (DO NOT MODIFY ANSWER)  Answer:  [email or web address]    -- IV Continuous 06/18/19 1656          ASSESSMENT and PLAN    * S/P lung transplant  Managed per LUT.   avoid hypoglycemia        Hyperglycemia  BG goal 140 - 180. Discontinue CTS given transplant.     Continue IV insulin infusion protocol  Requires intensive BG monitoring while on protocol         Adrenal cortical steroids causing adverse effect in therapeutic use  Glucocorticoids markedly increase  glucoses. Expect the steroid taper will help glucose control.         Prophylactic immunotherapy  May increase insulin resistance.       CF related Pancreatic insufficiency  May impact BG.           Amy Zapata, NP  Endocrinology  [facility]

## 2019-06-19 NOTE — CONSULTS
"  [facility]  Adult Nutrition  Consult Note    SUMMARY     Recommendations  Recommendation/Intervention:   1. When able to extubate, ADAT to Regular with texture per SLP.   2. If unable to extubate, RD to provide TF recommendations.   3. TSU RD to provide post-transplant diet education prior to discharge.   RD to monitor.    Goals: Patient to receive nutrition by RD follow-up  Nutrition Goal Status: new  Communication of RD Recs: (POC)    Reason for Assessment  Reason For Assessment: consult  Diagnosis: transplant/postoperative complications(s/p redo BLTx 6/18)  Relevant Medical History: CF s/p BLTx 6/2014, HTN, seizures  Interdisciplinary Rounds: attended  General Information Comments: POD#1 s/p BLTx. Intubated, sedated. Patient appears nourished, currently with no physical signs of malnutrition, and no weight loss PTA.   Nutrition Discharge Planning: TSU RD to provide post-transplant diet education prior to discharge.    Nutrition Risk Screen  Nutrition Risk Screen: no indicators present    Nutrition/Diet History  Spiritual, Cultural Beliefs, [employer] Practices, Values that Affect Care: no  Factors Affecting Nutritional Intake: NPO, on mechanical ventilation    Anthropometrics  Temp: 99.3 °F (37.4 °C)  Height Method: Stated  Height: 5' 1" (154.9 cm)  Height (inches): 61 in  Weight Method: Bed Scale  Weight: 64.2 kg (141 lb 8.6 oz)  Weight (lb): 141.54 lb  Ideal Body Weight (IBW), Female: 105 lb  % Ideal Body Weight, Female (lb): 123.77 lb  BMI (Calculated): 24.6  BMI Grade: 18.5-24.9 - normal    Lab/Procedures/Meds  Pertinent Labs Reviewed: reviewed  Pertinent Labs Comments: Glu 133, Ca 7.9  Pertinent Medications Reviewed: reviewed  Pertinent Medications Comments: famotidine, methylprednisolone, prograf, precedex, epinephrine, fentanyl, insulin drip, cardene, propofol    Estimated/Assessed Needs  Weight Used For Calorie Calculations: 64.2 kg (141 lb 8.6 oz)  Energy Calorie Requirements (kcal): " 1685 kcal/day  [facility]  Protein Requirements: 77-97 g/day(1.2-1.5 g/kg)  Weight Used For Protein Calculations: 64.2 kg (141 lb 8.6 oz)  Fluid Requirements (mL): 1 mL/kcal or per MD  Estimated Fluid Requirement Method: RDA Method  RDA Method (mL): 1685    Nutrition Prescription Ordered  Current Diet Order: NPO    Evaluation of Received Nutrient/Fluid Intake  Other Calories (kcal): 467(propofol)  I/O: +5L x 24hrs, +4.9L since admit  Comments: LBM 6/16  % Intake of Estimated Energy Needs: 0 - 25 %  % Meal Intake: NPO    Nutrition Risk  Level of Risk/Frequency of Follow-up: high(2x/week)     Assessment and Plan  Nutrition Problem  Increased nutrient needs    Related to (etiology):   Physiological causes related to healing    Signs and Symptoms (as evidenced by):   S/p redo BLTx 6/18     Interventions/Recommendations (treatment strategy):  Collaboration and referral of nutrition care    Nutrition Diagnosis Status:   New    Monitor and Evaluation  Food and Nutrient Intake: energy intake  Food and Nutrient Adminstration: diet order  Anthropometric Measurements: weight, weight change  Biochemical Data, Medical Tests and Procedures: electrolyte and renal panel, gastrointestinal profile, inflammatory profile  Nutrition-Focused Physical Findings: overall appearance     Nutrition Follow-Up  RD Follow-up?: Yes

## 2019-06-19 NOTE — SUBJECTIVE & OBJECTIVE
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       Past Surgical History:   Procedure Laterality Date    ABDOMINAL SURGERY      peg tube     [id number] N/A 5/19/2015    Performed by Deny Dias Jr., MD at [facility] OR    BIOPSY-BRONCHUS N/A 12/20/2016    Performed by Jake Alvarez MD at [facility] OR 2ND FLR    BRONCHOSCOPY Bilateral 12/11/2018    Performed by Francisca Morin DO at [facility] OR 2ND FLR    BRONCHOSCOPY N/A 10/1/2018    Performed by Jake Alvarez MD at [facility] OR 2ND FLR    BRONCHOSCOPY Bilateral 12/20/2016    Performed by Jake Alvarez MD at [facility] OR 2ND FLR    BRONCHOSCOPY - flexible bronchoscopy with probable tissue biopsy CPT 00476 N/A 7/21/2015    Performed by Jake Alvarez MD at [facility] OR 2ND FLR    BRONCHOSCOPY - flexible bronchoscopy with probable tissue biospy CPT 52844 N/A 10/20/2015    Performed by Jake Alvarez MD at [facility] OR 2ND FLR    BRONCHOSCOPY - flexible bronchoscopy with tissue biopsy CPT 24836 N/A 9/29/2015    Performed by Jake Alvarez MD at [facility] OR 2ND FLR    BRONCHOSCOPY - flexible bronchoscopy with tissue biopsy CPT 42922 N/A 5/26/2015    Performed by Jake Alvarez MD at [facility] OR 1ST FLR    BRONCHOSCOPY flexible bronchoscopy with tissue biopsy N/A 9/8/2014    Performed by Jake Alvarez MD at [facility] OR 2ND FLR    BRONCHOSCOPY flexible with possible tissue biopsy N/A 8/8/2014    Performed by Jake Alvarez MD at [facility] OR 2ND FLR    BRONCHOSCOPY, BAL, BIOPSIES N/A 3/20/2018    Performed by Jake Alvarez MD at  [facility] OR 2ND FLR    BRONCHOSCOPY-FIBEROPTIC N/A 1/29/2016    Performed by Joann Cifuentes DO at [facility] OR 2ND FLR    BRONCHOSCOPY; Bronchoscopy with BAL and transbronchial biopsies under general anesthesia N/A 5/29/2018    Performed by Jake Alvarez MD at [facility] OR 2ND FLR    CHOLECYSTECTOMY  2016    CHOLECYSTECTOMY-LAPAROSCOPIC N/A 7/22/2016    Performed by Joshua Goldberg, MD at [facility] OR 2ND FLR    COLONOSCOPY N/A 5/3/2019    Performed by Juancho Rich MD at [facility] ENDO (2ND FLR)    ENDOMETRIAL ABLATION  2015    [facility]    ETHMOIDECTOMY Bilateral 4/9/2019    Performed by Adin Burks III, MD at [facility] OR 2ND FLR    FESS      FESS Bilateral 10/21/2013    Performed by Jared Whatley MD at [facility] OR 2ND FLR    FESS, USING COMPUTER-ASSISTED NAVIGATION N/A 4/9/2019    Performed by Adin Burks III, MD at [facility] OR [address] [place] [address]    FINE NEEDLE ASPIRATION (FNA)  of a cervical lymphadenopathy  1/29/2016    Performed by Joann Cifuentes DO at [facility] OR 2ND FLR    flex bronchoscopy with probable tissue biopsy N/A 7/31/2014    Performed by [facility] Provider at [facility] OR 2ND FLR    flexible bronchoscopy with tissue biopsy N/A 12/11/2014    [facility]    HYSTERECTOMY  04/2017    [facility]    INJECTION-VOCAL CORD Left 6/24/2014    [facility]    TFFRZERIV-STWM-P-CATH to right chest and removal of portacath to left chests wall. Bilateral 6/24/2014    Performed by Zhen Dorado MD at [facility] OR 2ND FLR    LARYNX SURGERY  2016    LUNG TRANSPLANT Bilateral 6/12/14    MAXILLARY ANTROSTOMY  4/9/2019    [facility]    MYRINGOTOMY W/ TUBES Right 04/2017    MYRINGOTOMY WITH INSERTION OF PE TUBES Right 4/15/2017    Performed by Gary Null MD at [facility] OR 2ND FLR    PLACEMENT-TUBE-PEG  5/15/2014    [facility]    PORTACATH PLACEMENT Right      rt sc    REMOVAL-TUBE-PEG  5/15/2014    Performed by David Moses MD at [facility] OR 2ND FLR    RHC for lung re-transplant N/A 1/22/2019    Performed by Laura Rachel MD at [facility] CATH LAB    ROBOTIC ASSISTED LAPAROSCOPIC HYSTERECTOMY N/A 4/25/2017    Performed by Deny Dias Jr., MD at [facility] OR    SALPINGECTOMY Bilateral 2015    [facility]    SALPINGECTOMY-LAPAROSCOPIC Bilateral 5/19/2015    Performed by Deny Dias Jr., MD at [facility] OR    SINUS SURGERY FUNCTIONAL ENDOSCOPIC WITH NAVIGATION Bilateral 4/3/2017    Performed by Cesar Olsen MD at [facility] OR 2ND FLR    SINUS SURGERY FUNCTIONAL ENDOSCOPIC WITH NAVIGATION, 35978, 79918, 60783, 94073 Bilateral 2/5/2015    [facility]    SPHENOIDECTOMY Bilateral 4/9/2019    Performed by Adin Burks III, MD at [facility] OR [address] [place] [address]    THYROPLASTY - Medialization laryngoplasty, cricothyroid subluxation, arytenoid repositioning Left 8/2/2016    Performed by Gary Null MD at [facility] OR 2ND FLR    TRANSPLANT-LUNG Bilateral 6/11/2014    Performed by Adolfo Huston MD at [facility] OR [address] [place] [address]       Review of patient's allergies indicates:   Allergen Reactions    Albuterol Palpitations    Colistin Anaphylaxis    Vancomycin analogues      Infusion reaction that does not resolve with slowing  Pt. States she can tolerate it when given with 50 mg Benadryl and ran over 3 hours    Neupogen [filgrastim] Other (See Comments)     Ostealgia after five daily doses of 300 mcg.      Bactrim [sulfamethoxazole-trimethoprim] Hives    Ceftazidime Hives     Pt stated can tolerate cefapine not ceftazidime    Ceftazidime     Dronabinol Other (See Comments)     Mental changes/hallucinations    Haldol [haloperidol lactate] Other (See Comments)     Seizure like activity    Nsaids (non-steroidal anti-inflammatory drug)      Cannot have due to lung transplant    Adhesive Rash     Cloth tape- please use tegaderm or paper tape    Aztreonam  Rash    Ciprofloxacin Nausea And Vomiting     Projectile N/V, per patient.  Unwilling to retry therapy.       Medications:  Medications Prior to Admission   Medication Sig    amLODIPine (NORVASC) 10 MG tablet Take 1 tablet (10 mg total) by mouth once daily.    butalbital-acetaminophen-caffeine -40 mg (FIORICET, ESGIC) -40 mg per tablet Take 1 tablet by mouth every 6 (six) hours as needed for Pain.    calcium-vitamin D3 (OS-KATY 500 + D3) 500 mg(1,250mg) -200 unit per tablet Take 1 tablet by mouth 2 (two) times daily with meals.    dapsone 100 MG Tab Take 1 tablet (100 mg total) by mouth once daily.    diphenhydrAMINE (BENADRYL) 50 MG tablet Take 1 tablet (50 mg total) by mouth every 6 (six) hours as needed for Itching.    fexofenadine (ALLEGRA) 180 MG tablet Take 180 mg by mouth once daily.    fluconazole (DIFLUCAN) 150 MG Tab TAKE 1 TABLET BY MOUTH EVERY WEEK    fluticasone propionate (FLONASE) 50 mcg/actuation nasal spray INSERT 2 SPRAYS IN EACH NOSTRIL DAILY    folic acid (FOLVITE) 1 MG tablet Take 1 tablet (1,000 mcg total) by mouth once daily.    gabapentin (NEURONTIN) 300 MG capsule Take 1 capsule (300 mg total) by mouth 3 (three) times daily.    inhalation spacing device (PROCHAMBER) USE AS DIRECTED    levalbuterol (XOPENEX HFA) 45 mcg/actuation inhaler Inhale 1-2 puffs into the lungs.    levalbuterol (XOPENEX) 1.25 mg/3 mL nebulizer solution Take 3 mLs (1.25 mg total) by nebulization every 8 (eight) hours as needed for Wheezing or Shortness of Breath. Rescue    lipase-protease-amylase (CREON) 36,000-114,000- 180,000 unit CpDR Take 4 capsules by mouth 3 (three) times daily with meals. Take 3 with snacks as needed. (Patient taking differently: Take 5 capsules by mouth 3 (three) times daily with meals. )    LORazepam (ATIVAN) 2 MG Tab Take 1 tablet by mouth every 8 hours as needed for ANXIETY    magnesium oxide (MAG-OX) 400 mg (241.3 mg magnesium) tablet Take 1 tablet (400 mg total)  by mouth 2 (two) times daily.    metoprolol tartrate (LOPRESSOR) 25 MG tablet Take 1 tablet (25 mg total) by mouth 2 (two) times daily.    mometasone-formoterol (DULERA) 100-5 mcg/actuation HFAA Inhale 1 puff into the lungs 2 (two) times daily. Controller    montelukast (SINGULAIR) 10 mg tablet Take 1 tablet (10 mg total) by mouth once daily.    morphine (MS CONTIN) 15 MG 12 hr tablet Take 1 tablet by mouth twice a day    multivit,min52-folic-vitK-cQ10 (AQUADEKS) 100-700-10 mcg-mcg-mg Cap cap 1 tab twice daily    multivitamin-minerals no.55 (CENTRUM FLAVOR BURST ADULT) Chew     mycophenolate (CELLCEPT) 250 mg Cap Take 2 capsules (500 mg total) by mouth 2 (two) times daily.    omeprazole (PRILOSEC) 40 MG capsule Take 40 mg by mouth every morning.    ondansetron (ZOFRAN) 8 MG tablet Take 1 tablet (8 mg total) by mouth every 12 (twelve) hours as needed for Nausea.    oxycodone (ROXICODONE) 5 MG immediate release tablet Take 10 mg by mouth every 4 (four) hours as needed for Pain.    oxyCODONE (ROXICODONE) 5 MG immediate release tablet take 1 tablet by mouth twice a day    polyethylene glycol (GLYCOLAX) 17 gram PwPk Take 17 g by mouth 2 (two) times daily.    predniSONE (DELTASONE) 5 MG tablet Take 1 tablet (5 mg total) by mouth once daily.    promethazine (PHENERGAN) 25 MG tablet Take 1 tablet (25 mg total) by mouth every 6 (six) hours as needed for Nausea.    QUEtiapine (SEROQUEL) 25 MG Tab Take 1 tablet by mouth in morning and 2 tablets at bedtime    sodium chloride 3% 3 % nebulizer solution Take 4 mLs by nebulization once daily.    tacrolimus (PROGRAF) 0.5 MG Cap Take 1 capsule (0.5 mg total) by mouth every 12 (twelve) hours.    tacrolimus (PROGRAF) 1 MG Cap Take 2 capsules (2 mg total) by mouth every 12 (twelve) hours. Daily doses: 2.5 mg twice daily.    tiZANidine (ZANAFLEX) 4 MG tablet TAKE 2 TABLETS BY MOUTH EVERY 6 HOURS AS NEEDED    tobramycin with nebulizer 300 mg/5 mL Nebu Take 300 mg by  nebulization every 12 (twelve) hours.    topiramate (TOPAMAX) 25 MG tablet Take 1 tablet (25 mg total) by mouth 2 (two) times daily    venlafaxine (EFFEXOR-XR) 150 MG Cp24 Take 1 capsule (150 mg total) by mouth once daily.    venlafaxine (EFFEXOR-XR) 37.5 MG 24 hr capsule Take 1 capsule (37.5 mg total) by mouth once daily.     Antibiotics (From admission, onward)    Start     Stop Route Frequency Ordered    06/19/19 0900  sulfamethoxazole-trimethoprim 200-40 mg/5 ml suspension 20 mL      -- PER NG TUBE Every Mon, Wed, Fri 06/18/19 1411    06/19/19 0900  dapsone tablet 100 mg      -- OG Daily 06/18/19 1647    06/19/19 0500  vancomycin (VANCOCIN) 1250 mg in 5 % dextrose 250 mL IVPB      -- IV Every 12 hours (non-standard times) 06/18/19 1546    06/18/19 2100  mupirocin 2 % ointment 1 g      06/23 2059 Nasl 2 times daily 06/18/19 1411    06/18/19 1515  meropenem-0.9% sodium chloride 1 g/50 mL IVPB      -- IV Every 8 hours (non-standard times) 06/18/19 1411        Antifungals (From admission, onward)    Start     Stop Route Frequency Ordered    06/18/19 1515  fluconazole 40 mg/ml suspension 400 mg      -- PER NG TUBE Daily 06/18/19 1411        Antivirals (From admission, onward)        Stop Route Frequency     ganciclovir (CYTOVENE) injection      -- IV Every 12 hours (non-standard times)           Immunization History   Administered Date(s) Administered    Cytomegalovirus Immune Globulin 06/13/2014    DTP 08/23/1994, 08/16/1995    Hepatitis A, Adult 04/04/2018, 01/22/2019    Hepatitis B 08/10/2000, 11/20/2000    Hepatitis B, Pediatric/Adolescent 11/28/2001    Influenza 09/12/2018    Influenza - Quadrivalent - PF 10/04/2017, 09/06/2018    Influenza - Trivalent (ADULT) 11/28/2001, 01/22/2002, 12/10/2002, 11/26/2003, 10/19/2004, 11/15/2005, 11/11/2006, 10/15/2007, 10/28/2009, 11/03/2010, 10/19/2011    Influenza - Trivalent - PF (ADULT) 10/14/2008, 10/22/2012, 11/02/2016    Influenza A (H1N1) 2009 Monovalent -  IM - PF 12/01/2009    MMR 08/23/1994, 08/16/1995    Meningococcal Conjugate (MCV4P) 10/24/2008    OPV 08/23/1994, 08/16/1995, 08/10/2000    Pneumococcal Conjugate - 13 Valent 10/02/2017    Pneumococcal Conjugate - 7 Valent 01/22/2002    Pneumococcal Polysaccharide - 23 Valent 01/22/2002    Td (ADULT) 08/10/2000, 12/16/2010    Tdap 01/22/2019    Typhoid - ViCPs 04/06/2018       Family History     Problem Relation (Age of Onset)    Cancer Maternal Grandmother    Diabetes Maternal Grandfather    Drug abuse Maternal Grandfather    Hypertension Maternal Grandmother    Thrombocytopenia Maternal Grandfather        Social History     Socioeconomic History    Marital status: Single     Spouse name: Not on file    Number of children: Not on file    Years of education: Not on file    Highest education level: Not on file   Occupational History    Not on file   Social Needs    Financial resource strain: Not on file    Food insecurity:     Worry: Not on file     Inability: Not on file    Transportation needs:     Medical: Not on file     Non-medical: Not on file   Tobacco Use    Smoking status: Never Smoker    Smokeless tobacco: Never Used   Substance and Sexual Activity    Alcohol use: No     Comment: Has not had an alcoholic drink in more than 2 months.    Drug use: No    Sexual activity: Yes     Partners: Male     Birth control/protection: See Surgical Hx     Comment: current partner since Oct 2016   Lifestyle    Physical activity:     Days per week: Not on file     Minutes per session: Not on file    Stress: Not on file   Relationships    Social connections:     Talks on phone: Not on file     Gets together: Not on file     Attends Baptism service: Not on file     Active member of club or organization: Not on file     Attends meetings of clubs or organizations: Not on file     Relationship status: Not on file   Other Topics Concern    Not on file   Social History Narrative    Not on file      Review of Systems   Unable to perform ROS: Intubated     Objective:     Vital Signs (Most Recent):  Temp: 97.3 °F (36.3 °C) (06/18/19 2009)  Pulse: 92 (06/18/19 2009)  Resp: (!) 7 (06/18/19 2009)  BP: 109/61 (06/18/19 2000)  SpO2: 97 % (06/18/19 2009) Vital Signs (24h Range):  Temp:  [91.8 °F (33.2 °C)-98 °F (36.7 °C)] 97.3 °F (36.3 °C)  Pulse:  [] 92  Resp:  [0-29] 7  SpO2:  [94 %-100 %] 97 %  BP: (103-115)/(59-75) 109/61  Arterial Line BP: (111-135)/(55-75) 115/59     Weight: 59 kg (129 lb 15.4 oz)  Body mass index is 24.56 kg/m².    Estimated Creatinine Clearance: 61.7 mL/min (based on SCr of 1.1 mg/dL).    Physical Exam   Constitutional: No distress.   HENT:   Head: Normocephalic and atraumatic.   Intubated. RIJ line    Pulmonary/Chest:   Symmetric chest rise.  Multiple chest tubes with serous fluid   Abdominal: Soft. She exhibits no distension. There is no tenderness.   Neurological:   No spontaneous movements   Skin: Skin is warm and dry. She is not diaphoretic. No erythema.   Vitals reviewed.      Significant Labs: All pertinent labs within the past 24 hours have been reviewed.    Significant Imaging: I have reviewed all pertinent imaging results/findings within the past 24 hours.

## 2019-06-19 NOTE — ASSESSMENT & PLAN NOTE
Received total of 6u PRBCs, 2u plts, 1 cryo, and 4 FFP angie-operatively. Will be conservative with future transfusions.

## 2019-06-19 NOTE — ASSESSMENT & PLAN NOTE
Previously on tacrolimus, MMF, and daily prednisone. Received solumedrol in OR and basiliximab upon arrival to ICU. Repeat basiliximab on POD#4. Continue MMF, tacrolimus, and steroid taper. Daily tacrolimus levels and adjust dose as needed.

## 2019-06-19 NOTE — SUBJECTIVE & OBJECTIVE
Interval History/Significant Events: No acute events overnight. Chest tube output 80-120ml/hour. Received 1u pRBCs, 1plt, 1 cryo. Only on fentanyl, propofol. Minimal vent settings.     Follow-up For: Procedure(s) (LRB):  REDO BILATERAL LUNG TRANSPLANT; CLAMSHELL (Bilateral)    Post-Operative Day: 1 Day Post-Op    Objective:     Vital Signs (Most Recent):  Temp: 99.9 °F (37.7 °C) (06/19/19 0915)  Pulse: 90 (06/19/19 0915)  Resp: (!) 6 (06/19/19 0915)  BP: (!) 88/50 (06/19/19 0900)  SpO2: 99 % (06/19/19 0915) Vital Signs (24h Range):  Temp:  [91.8 °F (33.2 °C)-100 °F (37.8 °C)] 99.9 °F (37.7 °C)  Pulse:  [] 90  Resp:  [0-29] 6  SpO2:  [94 %-100 %] 99 %  BP: ()/(50-79) 88/50  Arterial Line BP: ()/(55-84) 123/75     Weight: 64.2 kg (141 lb 8.6 oz)  Body mass index is 26.74 kg/m².      Intake/Output Summary (Last 24 hours) at 6/19/2019 1030  Last data filed at 6/19/2019 1000  Gross per 24 hour   Intake 5921 ml   Output 3429 ml   Net 2492 ml       Physical Exam   Constitutional: She appears well-developed and well-nourished.   Eyes: Pupils are equal, round, and reactive to light.   Neck: No JVD present.   Cardiovascular: Normal rate, regular rhythm and normal heart sounds.   Sternal incision site bandaged appropriately, C/D/I  Right chest tube with ss output  Left CT with ss output.   R IJ TLC   Pulmonary/Chest: Breath sounds normal.   Intubated   Abdominal: Soft. Bowel sounds are normal. She exhibits no distension.   Nursing note and vitals reviewed.      Vents:  Vent Mode: A/C (06/19/19 0915)  Set Rate: 14 bmp (06/19/19 0915)  PEEP/CPAP: 8 cmH20 (06/19/19 0915)  Oxygen Concentration (%): 40 (06/19/19 0915)  Peak Airway Pressure: 28 cmH2O (06/19/19 0915)  Plateau Pressure: 26 cmH20 (06/19/19 0915)  Total Ve: 6.13 mL (06/19/19 0915)  F/VT Ratio<105 (RSBI): (!) 20.98 (06/19/19 0915)    Lines/Drains/Airways     Central Venous Catheter Line                 Introducer right internal jugular -- days          Percutaneous Central Line Insertion/Assessment - triple lumen  right internal jugular -- days         Port A Cath Single Lumen 06/24/14 1200 right subclavian 1820 days         Percutaneous Central Line Insertion/Assessment - double lumen  06/18/19 0510 1 day          Drain                 NG/OG Tube 06/18/19 Center mouth 1 day         Urethral Catheter 06/18/19 0504 Non-latex;Straight-tip;Temperature probe 14 Fr. 1 day         Y Chest Tube 1 and 2 06/18/19 1151 1 Right Mediastinal 19 Fr. 2 Right Pleural 19 Fr. less than 1 day         Y Chest Tube 3 and 4 06/18/19 1151 3 Left Pleural 19 Fr. 4 Left Mediastinal 19 Fr. less than 1 day          Airway                 Airway - Non-Surgical 06/18/19 0626 Endotracheal Tube 1 day          Arterial Line                 Arterial Line 06/18/19 0501 Left Brachial 1 day          Peripheral Intravenous Line                 Peripheral IV - Single Lumen 06/18/19 18 G Hand 1 day         Peripheral IV - Single Lumen 06/18/19 18 G Wrist 1 day                Significant Labs:    CBC/Anemia Profile:  Recent Labs   Lab 06/18/19 2100 06/19/19  0100  06/19/19  0500 06/19/19  0604 06/19/19  0908   WBC 10.00  --  7.19  --  8.20  --   --    HGB 7.2*  --  8.1*  --  7.7*  --   --    HCT 21.9*   < > 24.3*   < > 22.6* 18* 25*   PLT 56*  --  50*  --  78*  --   --    MCV 83  --  84  --  82  --   --    RDW 16.5*  --  15.9*  --  15.9*  --   --     < > = values in this interval not displayed.        Chemistries:  Recent Labs   Lab 06/18/19  1411 06/18/19  1735  06/18/19  2100 06/19/19  0100 06/19/19  0500    141  --  140 140 140   K 4.0  4.0  4.0 3.4*  --  4.3  4.3 4.3  4.3 4.3  4.3    104  --  108 110 109   CO2 19* 23  --  20* 21* 22*   BUN 22* 21*  --  20 18 18   CREATININE 0.9 1.1  --  1.0 0.8 0.8   CALCIUM 8.8 8.4*  --  8.1* 7.5* 7.9*   MG 1.5* 1.4*  --   --   --  2.6   PHOS 4.9*  --    < > 1.7* 3.9 3.0    < > = values in this interval not displayed.       ABGs:   Recent Labs    Lab 06/19/19  0908   PH 7.376   PCO2 42.6   HCO3 25.0   POCSATURATED 99   BE 0     Lactic Acid: No results for input(s): LACTATE in the last 48 hours.    Significant Imaging:  I have reviewed and interpreted all pertinent imaging results/findings within the past 24 hours.

## 2019-06-19 NOTE — PROGRESS NOTES
[facility]  Lung Transplant  Progress Note - Critical Care    Patient Name: Juanita Ibarra  MRN: [id number]  Admission Date: 6/17/2019  Hospital Length of Stay: 1 days  Post-Operative Day: 1833 (Lung), 1 (Lung)  Attending Physician: Francisca Morin DO  Primary Care Provider: Primary Doctor Beth     Subjective:     Interval History: POD#1 s/pBLT (re-transplant) on CPB. Received 1 unit PRBCs, 1 cryo, 1 plts overnight. UOP and CT output appropriate. Remains off pressors and cardene. On 250 fentanyl and 50 propofol this morning and transitioning to precedex. Bowel regimen started. On PC ventilation overnight with respiratory alkalosis on AM ABG.     Continuous Infusions:   dexmedetomidine (PRECEDEX) infusion 0.7 mcg/kg/hr (06/19/19 1100)    dextrose 5 % and 0.45 % NaCl with KCl 20 mEq 5 mL/hr at 06/18/19 1556    epinephrine infusion Stopped (06/18/19 1515)    fentanyl 25 mL/hr at 06/19/19 1100    insulin (HUMAN R) infusion (adults) Stopped (06/19/19 0200)    nicardipine Stopped (06/18/19 2200)    propofol 15 mcg/kg/min (06/19/19 1100)     Scheduled Meds:   basiliximab (SIMULECT) chemo infusion  20 mg Intravenous Q96H    bisacodyl  10 mg Oral Daily    chlorhexidine  10 mL Mouth/Throat BID    dapsone  100 mg Per OG tube Daily    docusate  100 mg Oral TID    famotidine (PF)  20 mg Intravenous BID    fluconazole 40 mg/ml  400 mg Per NG tube Daily    ganciclovir (CYTOVENE) IVPB  5 mg/kg Intravenous Q12H    lactulose  15 g Per OG tube TID    meropenem (MERREM) IVPB  1 g Intravenous Q8H    [START ON 6/20/2019] methylPREDNISolone (SOLU-Medrol) IVPB (doses > 250 mg)   Intravenous Daily    Followed by    [START ON 6/21/2019] methylPREDNISolone sodium succinate  2 mg/kg Intravenous Daily    mupirocin  1 g Nasal BID    [START ON 6/20/2019] mycophenolate mofetil  500 mg Per NG tube BID    polyethylene glycol  17 g Oral Daily    sodium bicarbonate  50 mEq Intravenous Once     [START ON 6/20/2019] tacrolimus  0.5 mg Per NG tube BID    vancomycin (VANCOCIN) IVPB  1,250 mg Intravenous Q12H     PRN Meds:sodium chloride, sodium chloride, sodium chloride, acetaminophen, Dextrose 10% Bolus, Dextrose 10% Bolus, diphenhydrAMINE, HYDROmorphone, lactulose, levalbuterol, magnesium sulfate IVPB, metoclopramide HCl, ondansetron, ondansetron, oxyCODONE, oxyCODONE, potassium chloride in water **AND** potassium chloride in water **AND** potassium chloride in water, sodium phosphate IVPB, sodium phosphate IVPB, sodium phosphate IVPB    Review of patient's allergies indicates:   Allergen Reactions    Albuterol Palpitations    Colistin Anaphylaxis    Vancomycin analogues      Infusion reaction that does not resolve with slowing  Pt. States she can tolerate it when given with 50 mg Benadryl and ran over 3 hours    Neupogen [filgrastim] Other (See Comments)     Ostealgia after five daily doses of 300 mcg.      Bactrim [sulfamethoxazole-trimethoprim] Hives    Ceftazidime Hives     Pt stated can tolerate cefapine not ceftazidime    Ceftazidime     Dronabinol Other (See Comments)     Mental changes/hallucinations    Haldol [haloperidol lactate] Other (See Comments)     Seizure like activity    Nsaids (non-steroidal anti-inflammatory drug)      Cannot have due to lung transplant    Adhesive Rash     Cloth tape- please use tegaderm or paper tape    Aztreonam Rash    Ciprofloxacin Nausea And Vomiting     Projectile N/V, per patient.  Unwilling to retry therapy.       Review of Systems   Unable to perform ROS: Intubated     Objective:   Physical Exam   Constitutional: She appears well-developed and well-nourished. No distress. She is sedated and intubated.   HENT:   Head: Normocephalic and atraumatic.   Nose: Nose normal.   ETT/OGT in place   Eyes: Pupils are equal, round, and reactive to light. Conjunctivae and EOM are normal.   Neck: Neck supple. No JVD present.   Right IJ CVC with dressing c/d/i;  right subclavian port with dressing c/d/i   Cardiovascular: Normal rate, regular rhythm and normal heart sounds.   Pulmonary/Chest: She is intubated. She has decreased breath sounds in the right lower field and the left lower field. She has no wheezes. She has no rhonchi. She has no rales.   Mechanical breath sounds b/l, Bilateral pleural and mediastinal chest tubes with sanguinous output, bilateral airleak   Abdominal: Soft. She exhibits distension. Bowel sounds are decreased. There is no tenderness.   Obese abdomen   Genitourinary:   Genitourinary Comments: Goldberg to gravity   Musculoskeletal:   Sedated   Neurological:   Intubated and sedated   Skin: Skin is warm and dry. She is not diaphoretic. There is pallor.   Psychiatric:   Intubated and sedated   Nursing note and vitals reviewed.        Vital Signs (Most Recent):  Temp: (!) 100.8 °F (38.2 °C) (06/19/19 1118)  Pulse: 95 (06/19/19 1118)  Resp: 16 (06/19/19 1118)  BP: (!) 124/93 (06/19/19 1100)  SpO2: 96 % (06/19/19 1118) Vital Signs (24h Range):  Temp:  [91.8 °F (33.2 °C)-101.1 °F (38.4 °C)] 100.8 °F (38.2 °C)  Pulse:  [] 95  Resp:  [0-29] 16  SpO2:  [94 %-100 %] 96 %  BP: ()/(50-93) 124/93  Arterial Line BP: ()/(55-84) 133/79     Weight: 64.2 kg (141 lb 8.6 oz)  Body mass index is 26.74 kg/m².      Intake/Output Summary (Last 24 hours) at 6/19/2019 1140  Last data filed at 6/19/2019 1100  Gross per 24 hour   Intake 5493 ml   Output 3534 ml   Net 1959 ml       Ventilator Data:     Vent Mode: A/C  Oxygen Concentration (%):  [40-42] 40  Resp Rate Total:  [14 br/min-53 br/min] 21 br/min  PEEP/CPAP:  [8 cmH20] 8 cmH20  Mean Airway Pressure:  [12 hbD36-28 cmH20] 15 cmH20    Hemodynamic Parameters:  CO:  [3.5 L/min-7.5 L/min] 6.2 L/min  CI:  [2.2 L/min/m2-4.8 L/min/m2] 4 L/min/m2    Lines/Drains:       Port A Cath Single Lumen 06/24/14 1200 right subclavian (Active)   Accessed by: Radha Ortega RN 6/17/2019  8:00 PM   Dressing Type Transparent  6/17/2019  8:00 PM   Dressing Status Clean;Dry;Intact;Biopatch in place 6/17/2019  8:00 PM   Dressing Intervention Dressing reinforced 6/17/2019  8:00 PM   Dressing Change Due 06/24/19 6/17/2019  8:00 PM   Line Status No blood return;Saline locked;Flushed 6/17/2019  8:00 PM   Flush Performed Yes 6/17/2019  8:00 PM   Date to be Reflushed 06/18/19 6/17/2019  8:00 PM   Daily Line Review Performed 6/17/2019  8:00 PM   Type of Needle [place] 6/17/2019  8:00 PM   Gauge 20 6/17/2019  8:00 PM   Needle Length 1 in 6/17/2019  8:00 PM   Needle Status Left in place 6/17/2019  8:00 PM   Needle Insertion Date 06/17/19 6/17/2019  8:00 PM   Needle Insertion Time 1900 6/17/2019  8:00 PM   Number of days: 1820            Percutaneous Central Line Insertion/Assessment - double lumen  06/18/19 0510 (Active)   Number of days: 0            Arterial Line 06/18/19 0501 Left Other (Comment) (Active)   Number of days: 0            Urethral Catheter 06/18/19 0504 Non-latex;Straight-tip;Temperature probe 14 Fr. (Active)   Number of days: 0            Y Chest Tube 1 and 2 06/18/19 1151 1 Right Mediastinal 19 Fr. 2 Left Mediastinal 19 Fr. (Active)   Number of days: 0            Y Chest Tube 3 and 4 06/18/19 1151 3 Right Pleural 19 Fr. 4 Left Pleural 19 Fr. (Active)   Number of days: 0       Significant Labs:  CBC:  Recent Labs   Lab 06/19/19  0500  06/19/19  0908   WBC 8.20  --   --    RBC 2.77*  --   --    HGB 7.7*  --   --    HCT 22.6*   < > 25*   PLT 78*  --   --    MCV 82  --   --    MCH 27.8  --   --    MCHC 34.1  --   --     < > = values in this interval not displayed.     BMP:  Recent Labs   Lab 06/19/19  0500      K 4.3  4.3      CO2 22*   BUN 18   CREATININE 0.8   CALCIUM 7.9*      Tacrolimus Levels:  No results for input(s): TACROLIMUS in the last 72 hours.  Microbiology:  Microbiology Results (last 7 days)     Procedure Component Value Units Date/Time    Aerobic culture [[id number]] Collected:  06/18/19 0930    Order Status:   Completed Specimen:  Tissue from Lung, Left Updated:  06/19/19 0915    Narrative:       Donor lung    Urine culture [[id number]] Collected:  06/17/19 1753    Order Status:  Completed Specimen:  Urine, Clean Catch Updated:  06/19/19 0117     Urine Culture, Routine No significant growth    Narrative:       Indicated criteria for high risk culture:->Other  Other (specify):->awaiting lung transplant    Gram stain [[id number]] Collected:  06/18/19 0930    Order Status:  Completed Specimen:  Tissue from Lung, Left Updated:  06/18/19 1041     Gram Stain Result Moderate WBC's      No organisms seen    Narrative:       Donor lung    Culture, Anaerobe [[id number]] Collected:  06/18/19 0930    Order Status:  Sent Specimen:  Tissue from Lung, Left Updated:  06/18/19 0945    AFB Culture & Smear [[id number]] Collected:  06/18/19 0930    Order Status:  Sent Specimen:  Tissue from Lung, Left Updated:  06/18/19 0945    Fungus culture [[id number]] Collected:  06/18/19 0930    Order Status:  Sent Specimen:  Tissue from Lung, Left Updated:  06/18/19 0944          I have reviewed all pertinent labs within the past 24 hours.    Diagnostic Results:  Chest X-Ray: I personally reviewed the films and findings are:     X-Ray: Increased infiltrates are noted bilaterally with appearance of the right lung base that may relate to prominent volume loss of the right lower lobe, small right PTx        Assessment/Plan:     * S/P lung transplant  POD#1 s/p bilateral lung transplant (re-transplant) for CARLOS EDUARDO. 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 Remains PGD 0. Will continue lung protective ventilation until pain regimen post-extubation can be established. LUT primary and CTS to manage chest tubes. Continue immunosuppression and ppx per protocol. Daily ABG and CXR.     Immunosuppression  Previously on tacrolimus, MMF, and daily prednisone. Received solumedrol in OR and basiliximab upon arrival to ICU. Repeat basiliximab on POD#4. Continue MMF, tacrolimus,  and steroid taper. Daily tacrolimus levels and adjust dose as needed.     Prophylactic antibiotic  CMV D-/R+. Continue OIP with ganciclovir, fluconazole, and dapsone. On Merrem/Vanc for routine surgical prophylaxis based on previous sensitivities. ID following appreciate recs.     Thrombocytopenia, unspecified  Expected post-operatively. Continue to monitor on serial CBC.    Acute blood loss anemia  Received total of 6u PRBCs, 2u plts, 1 cryo, and 4 FFP angie-operatively. Will be conservative with future transfusions.     Adrenal cortical steroids causing adverse effect in therapeutic use  Endocrinology consulted, appreciate recs.    Chronic pain with opiate use  Anesthesia consulted for pain management, appreciate recs. Will need pain regimen established prior to bronchoscopy and extubation. Aggressive bowel regimen started.     CF related Pancreatic insufficiency  Will start pancreatic enzymes once diet is started.         Preventive Measures: Nutrition: Goal: n/a, DVT: discontinue prophyllaxis, Head of Bet: elevated, Reposition: per unit routine, Sedation Interruption    Counseling/Consultation:I discussed the case with Dr. Morin.      Shama Canales PA-C  Lung Transplant  [facility]

## 2019-06-19 NOTE — SUBJECTIVE & OBJECTIVE
Subjective:     Interval History: POD#1 s/pBLT (re-transplant) on CPB. Received 1 unit PRBCs, 1 cryo, 1 plts overnight. UOP and CT output appropriate. Remains off pressors and cardene. On 250 fentanyl and 50 propofol this morning and transitioning to precedex. Bowel regimen started. On PC ventilation overnight with respiratory alkalosis on AM ABG.     Continuous Infusions:   dexmedetomidine (PRECEDEX) infusion 0.7 mcg/kg/hr (06/19/19 1100)    dextrose 5 % and 0.45 % NaCl with KCl 20 mEq 5 mL/hr at 06/18/19 1556    epinephrine infusion Stopped (06/18/19 1515)    fentanyl 25 mL/hr at 06/19/19 1100    insulin (HUMAN R) infusion (adults) Stopped (06/19/19 0200)    nicardipine Stopped (06/18/19 2200)    propofol 15 mcg/kg/min (06/19/19 1100)     Scheduled Meds:   basiliximab (SIMULECT) chemo infusion  20 mg Intravenous Q96H    bisacodyl  10 mg Oral Daily    chlorhexidine  10 mL Mouth/Throat BID    dapsone  100 mg Per OG tube Daily    docusate  100 mg Oral TID    famotidine (PF)  20 mg Intravenous BID    fluconazole 40 mg/ml  400 mg Per NG tube Daily    ganciclovir (CYTOVENE) IVPB  5 mg/kg Intravenous Q12H    lactulose  15 g Per OG tube TID    meropenem (MERREM) IVPB  1 g Intravenous Q8H    [START ON 6/20/2019] methylPREDNISolone (SOLU-Medrol) IVPB (doses > 250 mg)   Intravenous Daily    Followed by    [START ON 6/21/2019] methylPREDNISolone sodium succinate  2 mg/kg Intravenous Daily    mupirocin  1 g Nasal BID    [START ON 6/20/2019] mycophenolate mofetil  500 mg Per NG tube BID    polyethylene glycol  17 g Oral Daily    sodium bicarbonate  50 mEq Intravenous Once    [START ON 6/20/2019] tacrolimus  0.5 mg Per NG tube BID    vancomycin (VANCOCIN) IVPB  1,250 mg Intravenous Q12H     PRN Meds:sodium chloride, sodium chloride, sodium chloride, acetaminophen, Dextrose 10% Bolus, Dextrose 10% Bolus, diphenhydrAMINE, HYDROmorphone, lactulose, levalbuterol, magnesium sulfate IVPB, metoclopramide HCl,  ondansetron, ondansetron, oxyCODONE, oxyCODONE, potassium chloride in water **AND** potassium chloride in water **AND** potassium chloride in water, sodium phosphate IVPB, sodium phosphate IVPB, sodium phosphate IVPB    Review of patient's allergies indicates:   Allergen Reactions    Albuterol Palpitations    Colistin Anaphylaxis    Vancomycin analogues      Infusion reaction that does not resolve with slowing  Pt. States she can tolerate it when given with 50 mg Benadryl and ran over 3 hours    Neupogen [filgrastim] Other (See Comments)     Ostealgia after five daily doses of 300 mcg.      Bactrim [sulfamethoxazole-trimethoprim] Hives    Ceftazidime Hives     Pt stated can tolerate cefapine not ceftazidime    Ceftazidime     Dronabinol Other (See Comments)     Mental changes/hallucinations    Haldol [haloperidol lactate] Other (See Comments)     Seizure like activity    Nsaids (non-steroidal anti-inflammatory drug)      Cannot have due to lung transplant    Adhesive Rash     Cloth tape- please use tegaderm or paper tape    Aztreonam Rash    Ciprofloxacin Nausea And Vomiting     Projectile N/V, per patient.  Unwilling to retry therapy.       Review of Systems   Unable to perform ROS: Intubated     Objective:   Physical Exam   Constitutional: She appears well-developed and well-nourished. No distress. She is sedated and intubated.   HENT:   Head: Normocephalic and atraumatic.   Nose: Nose normal.   ETT/OGT in place   Eyes: Pupils are equal, round, and reactive to light. Conjunctivae and EOM are normal.   Neck: Neck supple. No JVD present.   Right IJ CVC with dressing c/d/i; right subclavian port with dressing c/d/i   Cardiovascular: Normal rate, regular rhythm and normal heart sounds.   Pulmonary/Chest: She is intubated. She has decreased breath sounds in the right lower field and the left lower field. She has no wheezes. She has no rhonchi. She has no rales.   Mechanical breath sounds b/l, Bilateral  pleural and mediastinal chest tubes with sanguinous output, bilateral airleak   Abdominal: Soft. She exhibits distension. Bowel sounds are decreased. There is no tenderness.   Obese abdomen   Genitourinary:   Genitourinary Comments: Goldberg to gravity   Musculoskeletal:   Sedated   Neurological:   Intubated and sedated   Skin: Skin is warm and dry. She is not diaphoretic. There is pallor.   Psychiatric:   Intubated and sedated   Nursing note and vitals reviewed.        Vital Signs (Most Recent):  Temp: (!) 100.8 °F (38.2 °C) (06/19/19 1118)  Pulse: 95 (06/19/19 1118)  Resp: 16 (06/19/19 1118)  BP: (!) 124/93 (06/19/19 1100)  SpO2: 96 % (06/19/19 1118) Vital Signs (24h Range):  Temp:  [91.8 °F (33.2 °C)-101.1 °F (38.4 °C)] 100.8 °F (38.2 °C)  Pulse:  [] 95  Resp:  [0-29] 16  SpO2:  [94 %-100 %] 96 %  BP: ()/(50-93) 124/93  Arterial Line BP: ()/(55-84) 133/79     Weight: 64.2 kg (141 lb 8.6 oz)  Body mass index is 26.74 kg/m².      Intake/Output Summary (Last 24 hours) at 6/19/2019 1140  Last data filed at 6/19/2019 1100  Gross per 24 hour   Intake 5493 ml   Output 3534 ml   Net 1959 ml       Ventilator Data:     Vent Mode: A/C  Oxygen Concentration (%):  [40-42] 40  Resp Rate Total:  [14 br/min-53 br/min] 21 br/min  PEEP/CPAP:  [8 cmH20] 8 cmH20  Mean Airway Pressure:  [12 qeZ91-86 cmH20] 15 cmH20    Hemodynamic Parameters:  CO:  [3.5 L/min-7.5 L/min] 6.2 L/min  CI:  [2.2 L/min/m2-4.8 L/min/m2] 4 L/min/m2    Lines/Drains:       Port A Cath Single Lumen 06/24/14 1200 right subclavian (Active)   Accessed by: Radha Ortega RN 6/17/2019  8:00 PM   Dressing Type Transparent 6/17/2019  8:00 PM   Dressing Status Clean;Dry;Intact;Biopatch in place 6/17/2019  8:00 PM   Dressing Intervention Dressing reinforced 6/17/2019  8:00 PM   Dressing Change Due 06/24/19 6/17/2019  8:00 PM   Line Status No blood return;Saline locked;Flushed 6/17/2019  8:00 PM   Flush Performed Yes 6/17/2019  8:00 PM   Date to be  Reflushed 06/18/19 6/17/2019  8:00 PM   Daily Line Review Performed 6/17/2019  8:00 PM   Type of Needle Nicole 6/17/2019  8:00 PM   Gauge 20 6/17/2019  8:00 PM   Needle Length 1 in 6/17/2019  8:00 PM   Needle Status Left in place 6/17/2019  8:00 PM   Needle Insertion Date 06/17/19 6/17/2019  8:00 PM   Needle Insertion Time 1900 6/17/2019  8:00 PM   Number of days: 1820            Percutaneous Central Line Insertion/Assessment - double lumen  06/18/19 0510 (Active)   Number of days: 0            Arterial Line 06/18/19 0501 Left Other (Comment) (Active)   Number of days: 0            Urethral Catheter 06/18/19 0504 Non-latex;Straight-tip;Temperature probe 14 Fr. (Active)   Number of days: 0            Y Chest Tube 1 and 2 06/18/19 1151 1 Right Mediastinal 19 Fr. 2 Left Mediastinal 19 Fr. (Active)   Number of days: 0            Y Chest Tube 3 and 4 06/18/19 1151 3 Right Pleural 19 Fr. 4 Left Pleural 19 Fr. (Active)   Number of days: 0       Significant Labs:  CBC:  Recent Labs   Lab 06/19/19  0500  06/19/19  0908   WBC 8.20  --   --    RBC 2.77*  --   --    HGB 7.7*  --   --    HCT 22.6*   < > 25*   PLT 78*  --   --    MCV 82  --   --    MCH 27.8  --   --    MCHC 34.1  --   --     < > = values in this interval not displayed.     BMP:  Recent Labs   Lab 06/19/19  0500      K 4.3  4.3      CO2 22*   BUN 18   CREATININE 0.8   CALCIUM 7.9*      Tacrolimus Levels:  No results for input(s): TACROLIMUS in the last 72 hours.  Microbiology:  Microbiology Results (last 7 days)     Procedure Component Value Units Date/Time    Aerobic culture [[id number]] Collected:  06/18/19 0930    Order Status:  Completed Specimen:  Tissue from Lung, Left Updated:  06/19/19 0915    Narrative:       Donor lung    Urine culture [[id number]] Collected:  06/17/19 1753    Order Status:  Completed Specimen:  Urine, Clean Catch Updated:  06/19/19 0117     Urine Culture, Routine No significant growth    Narrative:       Indicated criteria for  high risk culture:->Other  Other (specify):->awaiting lung transplant    Gram stain [[id number]] Collected:  06/18/19 0930    Order Status:  Completed Specimen:  Tissue from Lung, Left Updated:  06/18/19 1041     Gram Stain Result Moderate WBC's      No organisms seen    Narrative:       Donor lung    Culture, Anaerobe [[id number]] Collected:  06/18/19 0930    Order Status:  Sent Specimen:  Tissue from Lung, Left Updated:  06/18/19 0945    AFB Culture & Smear [[id number]] Collected:  06/18/19 0930    Order Status:  Sent Specimen:  Tissue from Lung, Left Updated:  06/18/19 0945    Fungus culture [[id number]] Collected:  06/18/19 0930    Order Status:  Sent Specimen:  Tissue from Lung, Left Updated:  06/18/19 0944          I have reviewed all pertinent labs within the past 24 hours.    Diagnostic Results:  Chest X-Ray: I personally reviewed the films and findings are:     X-Ray: Increased infiltrates are noted bilaterally with appearance of the right lung base that may relate to prominent volume loss of the right lower lobe, small right PTx

## 2019-06-19 NOTE — ASSESSMENT & PLAN NOTE
kati Ibarra is a 29 y.o. female with CF s/p BL Lung Transplant June 2014 complicated by bronchiolitis obliterans and left vocal chord paralysis s/p L arytenopexy/laryngoplasty(implant) and cricoid subluxation. In addition has HTN, Anxiety, chronic opioid use. Does not have DM. She is s/p bilateral lung transplant 6/18/2019. A clamshell thoracotomy was used. Transported to SICU for recovery.     Neuro:   - Sedation:Propofol and fentanyl  - Hx/o chronic opioid use in post-op patient    - Propofol ggt with PRN dilauded while sedated   - Consult acute pain service if needed for assistance with post-op pain control   - Consider bilateral TARA blocks     Pulmonary:   - Intubated:  Vent Mode: A/C  Oxygen Concentration (%):  [40-42] 40  Resp Rate Total:  [14 br/min-53 br/min] 21 br/min  PEEP/CPAP:  [8 cmH20] 8 cmH20  Mean Airway Pressure:  [12 paB07-48 cmH20] 14 cmH20  - Wean vent as tolerated.   - ABGs PRN  - Daily CXR while intubated   - Management of vent per Lung Transplant Team     Cardiac:  - Drips: Cardene for MAP < 80  - Keep MAPs >65  - HTN   -Hold home anti-hypertensives in acute setting   - Monitor chest tube output and character    Renal:   - Trend BUN/Cr  - Monitor UOP     ID:   - Afebrile, trend WBC  - Abx:Meropenem, IV Vanc, fluconazole  - Give benadryll prior to Vanc administration to prevent red man syndrome  - Will continue to monitor for signs of infection   - ID consulted, f/u recs    Immuno:  - Cellcept, tacro, steroid taper  - Immunomodulatory drugs per CTS  - Basiliximab q4 days x2 doses     Hem:  - Trend Hgb post-op  - No signs of active bleeding     Endocrine:  - Insulin gtt   - Monitor BG     FEN/GI:   - NPO   - Replace lytes PRN      PPx:  - Asa       Dispo:  - Continue ICU Care  - Vent management per lung transplant      Primary:   CTS

## 2019-06-19 NOTE — SUBJECTIVE & OBJECTIVE
"Interval HPI:   Overnight events: Remains in ICU, intubated. NAEON. On insulin infusion rates 1.7-5.6 u/hr then suspended with BG of 69.   Eating:   NPO  Nausea: No  Hypoglycemia and intervention: Yes d10 bolus x2.   Fever: No  TPN and/or TF: No      /68 (BP Location: Right arm, Patient Position: Lying)   Pulse 90   Temp 99.9 °F (37.7 °C)   Resp (!) 6   Ht 5' 1" (1.549 m)   Wt 64.2 kg (141 lb 8.6 oz)   LMP 10/02/2016   SpO2 99%   Breastfeeding? No   BMI 26.74 kg/m²     Labs Reviewed and Include    Recent Labs   Lab 06/19/19  0500   *   CALCIUM 7.9*      K 4.3  4.3   CO2 22*      BUN 18   CREATININE 0.8     Lab Results   Component Value Date    WBC 8.20 06/19/2019    HGB 7.7 (L) 06/19/2019    HCT 18 (LL) 06/19/2019    MCV 82 06/19/2019    PLT 78 (L) 06/19/2019     No results for input(s): TSH, FREET4 in the last 168 hours.  Lab Results   Component Value Date    HGBA1C 4.7 01/09/2019       Nutritional status:   Body mass index is 26.74 kg/m².  Lab Results   Component Value Date    ALBUMIN 3.5 06/17/2019    ALBUMIN 3.2 (L) 06/10/2019    ALBUMIN 3.3 (L) 06/06/2019     Lab Results   Component Value Date    PREALBUMIN 15 (L) 05/29/2014    PREALBUMIN 11 (L) 05/09/2014    PREALBUMIN 18 (L) 03/19/2014       Estimated Creatinine Clearance: 88.3 mL/min (based on SCr of 0.8 mg/dL).    Accu-Checks  Recent Labs     06/19/19  0002 06/19/19  0056 06/19/19  0159 06/19/19  0230 06/19/19  0303 06/19/19  0409 06/19/19  0502 06/19/19  0559 06/19/19  0809 06/19/19  0858   POCTGLUCOSE 145* 133* 69* 78 214* 121* 139* 123* 155* 132*       Current Medications and/or Treatments Impacting Glycemic Control  Immunotherapy:    Immunosuppressants         Stop Route Frequency     mycophenolate mofetil 200 mg/mL suspension 500 mg      -- PER NG TUBE 2 times daily     tacrolimus (PROGRAF) 1 mg/mL oral syringe      -- PER NG TUBE 2 times daily     basiliximab (SIMULECT) 20 mg in dextrose 5 % 50 mL chemo infusion      " 06/26 1514 IV Every 96 hours        Steroids:   Hormones (From admission, onward)    Start     Stop Route Frequency Ordered    06/21/19 0900  methylPREDNISolone sodium succinate injection 118 mg  (methylprednisolone panel)      06/22 0859 IV Daily 06/18/19 1411    06/20/19 0900  methylPREDNISolone sodium succinate (SOLU-MEDROL) 177 mg in dextrose 5 % 100 mL IVPB  (methylprednisolone panel)      06/21 0859 IV Daily 06/18/19 1411    06/19/19 0900  methylPREDNISolone sodium succinate (SOLU-MEDROL) 295 mg in dextrose 5 % 100 mL IVPB  (methylprednisolone panel)      06/20 0859 IV Daily 06/18/19 1411        Pressors:    Autonomic Drugs (From admission, onward)    Start     Stop Route Frequency Ordered    06/18/19 1515  EPINEPHrine (ADRENALIN) 5 mg in sodium chloride 0.9% 250 mL infusion     Question Answer Comment   Titrate by: (in mcg/kg/min) 0.05    Titrate interval: (in minutes) 5    Titrate to maintain: (SBP or MAP or Cardiac Index) MAP    Greater than: (in mmHg) 60    Cardiac index greater than: (in L/min) 2.2    Maximum dose: (in mcg/kg/min) 2        -- IV Continuous 06/18/19 1411        Hyperglycemia/Diabetes Medications:   Antihyperglycemics (From admission, onward)    Start     Stop Route Frequency Ordered    06/18/19 1800  insulin regular (Humulin R) 100 Units in sodium chloride 0.9% 100 mL infusion     Question:  Insulin Rate Adjustment (DO NOT MODIFY ANSWER)  Answer:  [email or web address]    -- IV Continuous 06/18/19 1655

## 2019-06-19 NOTE — PROGRESS NOTES
Pt remained intubated and sedated throughout shift   Follows all commands, nods and gestures appropriately, and mouths words  Vent settings: AC rate of 18, 40% FiO2, and 8 of PEEP; O2 sat currently 100%  ABGs currently being monitor Q3H; lactic currently lower than 2  All VSS throughout shift; maintained normothermia through the use of bear hugger  250 of albumin, 1 of PRBCs, 1 of cryo, and 1 of plts given this shift  CVP ranged from 9 to 12 throughout shift  Both right and left chest tubes are putting out approximately 100 ml/hr of serosanguineous output  Labs monitored every 4 hours and adjustments made as needed in regards to lytes and product  Accu cks monitored Q1H; insulin gtt currently off per protocol. Dextrose 10% given for hypoglycemia   UOP remained adequate throughout shift; decreased once or twice but then came up without intervention  POC discussed with pt and spouse; all questions and concerns addressed  See flowsheets for specific details and full assessment

## 2019-06-19 NOTE — PROGRESS NOTES
Pharmacokinetic Initial Assessment: IV Vancomycin    Assessment/Plan:    Initiate intravenous vancomycin at 1250 mg twice daily POD 0 of BLT due to CF.      Desired empiric serum trough concentration is 10 to 20 mcg/mL.  Draw vancomycin trough before the 4th dose on June 20 at 0500  Pharmacy will continue to follow and monitor vancomycin.      Please contact pharmacy at extension [phone] with any questions regarding this assessment.     Thank you for the consult,   Yasmani Rain     Patient brief summary:  Juanita Ibarra is a 29 y.o. female initiated on antimicrobial therapy with IV Vancomycin for treatment of suspected pneumonia post lung transplant.    Drug Allergies:   Review of patient's allergies indicates:   Allergen Reactions    Albuterol Palpitations    Colistin Anaphylaxis    Vancomycin analogues      Infusion reaction that does not resolve with slowing  Pt. States she can tolerate it when given with 50 mg Benadryl and ran over 3 hours    Neupogen [filgrastim] Other (See Comments)     Ostealgia after five daily doses of 300 mcg.      Bactrim [sulfamethoxazole-trimethoprim] Hives    Ceftazidime Hives     Pt stated can tolerate cefapine not ceftazidime    Ceftazidime     Dronabinol Other (See Comments)     Mental changes/hallucinations    Haldol [haloperidol lactate] Other (See Comments)     Seizure like activity    Nsaids (non-steroidal anti-inflammatory drug)      Cannot have due to lung transplant    Adhesive Rash     Cloth tape- please use tegaderm or paper tape    Aztreonam Rash    Ciprofloxacin Nausea And Vomiting     Projectile N/V, per patient.  Unwilling to retry therapy.       Actual Body Weight:   64.2 kg    Renal Function:   Estimated Creatinine Clearance: 88.3 mL/min (based on SCr of 0.8 mg/dL).,     Dialysis Method (if applicable):  N/A    CBC (last 72 hours):  Recent Labs   Lab Result Units 06/17/19  1005 06/18/19  1411 06/18/19  1735 06/18/19  2100 06/19/19  0100  06/19/19  0500   WBC K/uL 6.51 9.10 10.27 10.00 7.19 8.20   Hemoglobin g/dL 9.5* 10.1* 8.1* 7.2* 8.1* 7.7*   Hematocrit % 33.3* 30.6* 24.9* 21.9* 24.3* 22.6*   Platelets K/uL 181 70* 57* 56* 50* 78*   Gran% % 53.9 87.7* 91.7* 89.6* 78.0* 85.2*   Lymph% % 30.6 4.2* 2.6* 4.2* 8.0* 9.4*   Mono% % 9.4 7.0 5.2 5.8 5.0 4.9   Eosinophil% % 5.1 0.1 0.0 0.0 0.0 0.1   Basophil% % 0.8 0.2 0.1 0.1 1.0 0.2   Differential Method  Automated Automated Automated Automated Manual Automated       Metabolic Panel (last 72 hours):  Recent Labs   Lab Result Units 06/17/19  1005 06/17/19  1753 06/18/19  1411 06/18/19  1735 06/18/19  1823 06/18/19  2100 06/19/19  0100 06/19/19  0500   Sodium mmol/L 139  --  138 141  --  140 140 140   Potassium mmol/L 3.9  --  4.0  4.0  4.0 3.4*  --  4.3  4.3 4.3  4.3 4.3  4.3   Chloride mmol/L 99  --  105 104  --  108 110 109   CO2 mmol/L 31*  --  19* 23  --  20* 21* 22*   Glucose mg/dL 133*  --  187* 317*  --  212* 135* 133*   Glucose, UA   --  Negative  --   --   --   --   --   --    BUN, Bld mg/dL 16  --  22* 21*  --  20 18 18   Creatinine mg/dL 0.9  --  0.9 1.1  --  1.0 0.8 0.8   Albumin g/dL 3.5  --   --   --   --   --   --   --    Total Bilirubin mg/dL 0.2  --   --   --   --   --   --   --    Alkaline Phosphatase U/L 488*  --   --   --   --   --   --   --    AST U/L 52*  --   --   --   --   --   --   --    ALT U/L 64*  --   --   --   --   --   --   --    Magnesium mg/dL  --   --  1.5* 1.4*  --   --   --  2.6   Phosphorus mg/dL  --   --  4.9*  --  1.9* 1.7* 3.9 3.0       Drug levels (last 3 results):  No results for input(s): VANCOMYCINRA, VANCOMYCINPE, VANCOMYCINTR in the last 72 hours.    Microbiologic Results:  Microbiology Results (last 7 days)     Procedure Component Value Units Date/Time    Urine culture [[id number]] Collected:  06/17/19 6739    Order Status:  Completed Specimen:  Urine, Clean Catch Updated:  06/19/19 0117     Urine Culture, Routine No significant growth    Narrative:        Indicated criteria for high risk culture:->Other  Other (specify):->awaiting lung transplant    Gram stain [[id number]] Collected:  06/18/19 0930    Order Status:  Completed Specimen:  Tissue from Lung, Left Updated:  06/18/19 1041     Gram Stain Result Moderate WBC's      No organisms seen    Narrative:       Donor lung    Aerobic culture [[id number]] Collected:  06/18/19 0930    Order Status:  Sent Specimen:  Tissue from Lung, Left Updated:  06/18/19 0946    Culture, Anaerobe [[id number]] Collected:  06/18/19 0930    Order Status:  Sent Specimen:  Tissue from Lung, Left Updated:  06/18/19 0945    AFB Culture & Smear [[id number]] Collected:  06/18/19 0930    Order Status:  Sent Specimen:  Tissue from Lung, Left Updated:  06/18/19 0945    Fungus culture [[id number]] Collected:  06/18/19 0930    Order Status:  Sent Specimen:  Tissue from Lung, Left Updated:  06/18/19 0944

## 2019-06-19 NOTE — PROGRESS NOTES
Patient was seen with Dr. Morin, Shama Canales PA-C, Nae Musa, JazzyD, and Lacey Gomez, MARGARITA.  Patient received her second bilateral lung transplant yesterday.  She was intubated and sedated but arousable to verbal stimuli and able to follow commands.  Patient's significant other, mother, aunt and mother-in-law were at bedside.  Dr. Morin reviewed the plan of care with them as follows:  1.  Consult with pain management team to determine a medication plan that will keep patient comfortable while allowing her to turn, cough, deep breathe, and use an IS once extubated.  2.  Bedside bronchoscopy then possible weaning of sedation in hopes for extubation later today.  The patient's family members asked questions, which were answered to their satisfaction.  All verbalized their understanding of the information discussed.  Transplant coordinator will continue to follow with the multi-disciplinary team, and remains available to assist with patient/family care and education.

## 2019-06-19 NOTE — NURSING
Dr. Newby notified of most recent ABG (see flowsheets) Lactic up to 4.3. New orders given to administer albumin 25% 500cc along with 2 amps bicarb. Updated on current lab results and outputs at this time. New orders given to continue to replace electrolytes accordingly. Will continue to monitor and updated after next hour's ABG result and outputs.

## 2019-06-19 NOTE — ASSESSMENT & PLAN NOTE
Anesthesia consulted for pain management, appreciate recs. Will need pain regimen established prior to bronchoscopy and extubation. Aggressive bowel regimen started.

## 2019-06-19 NOTE — ANESTHESIA PROCEDURE NOTES
Nancie    Patient location during procedure: pre-op   Block not for primary anesthetic.  Reason for block: at surgeon's request and post-op pain management   Post-op Pain Location: chest pain bilateral  Start time: 6/19/2019 1:11 PM  Timeout: 6/19/2019 1:09 PM   End time: 6/19/2019 1:41 PM  Staffing  Anesthesiologist: Chai Glass MD  Resident/CRNA: Heather Awad MD  Other anesthesia staff: David Dennison Jr., MD  Performed: resident/CRNA   Preanesthetic Checklist  Completed: patient identified, site marked, surgical consent, pre-op evaluation, timeout performed, IV checked, risks and benefits discussed and monitors and equipment checked  Peripheral Block  Patient position: sitting  Prep: ChloraPrep  Patient monitoring: heart rate, cardiac monitor, continuous pulse ox, continuous capnometry and frequent blood pressure checks  Block type: erector spinae plane (Erector Spinae Plane)  Laterality: bilateral  Injection technique: continuous  Location: T5-6  Needle  Needle type: Tuohy   Needle gauge: 17 G  Needle length: 3.5 in  Needle localization: anatomical landmarks and ultrasound guidance  Catheter type: spring wound  Catheter size: 19 G  Test dose: lidocaine 1.5% with Epi 1-to-200,000 and negative   -ultrasound image captured on disc.  Assessment  Injection assessment: negative aspiration, negative parasthesia and local visualized surrounding nerve  Paresthesia pain: none  Heart rate change: no  Slow fractionated injection: yes  Additional Notes  Patient tolerated very well.  Vital signs stable.  See [facility] RN charting for vital signs. 60 cc 0.375% bupiv given

## 2019-06-20 NOTE — PLAN OF CARE
Problem: Adult Inpatient Plan of Care  Goal: Plan of Care Review  Outcome: Ongoing (interventions implemented as appropriate)  Bronchoscopy done today per Dr. Morin. Cultures collected and sent.   Ketamine gtt started today per anesthesia. Currently @ 15 mcg/kg/min.   Extubated today to 10 L HFNC, SpO2 100%.  OOBTC today x 4 hours  Failed bedside swallow eval, speech consult in place and awaiting evaluation.  Precedex gtt infusing for comfort  Insulin gtt @ 0.5 units/hr, accucheck q4h.  Chest tube output serosanguineous on left and right. Right with 180 mL and left with 110 mL this shift.  UO adequate ~ mL/hr.  CVP 7-9 flat.  Pt encouraged to deep breathe and cough, IS use encouraged, CPT and breathing txs in place.  POC reviewed with pt and spouse, all questions and concerns addressed.

## 2019-06-20 NOTE — ASSESSMENT & PLAN NOTE
CMV D-/R+. Continue OIP with ganciclovir, fluconazole, and dapsone. On Merrem/Vanc for routine surgical prophylaxis based on previous sensitivities. Will follow up on BAL from 6/20. ID following appreciate recs.

## 2019-06-20 NOTE — PLAN OF CARE
Problem: Adult Inpatient Plan of Care  Goal: Plan of Care Review  Outcome: Ongoing (interventions implemented as appropriate)  Pt remains intubated at this time on A/C PC 40%/ 6 PEEP RR16 tolerating well. All VSS, afebrile. BP goals of SBP <160. Pt transitioned to precedex and turned propofol off (orders to turn back on if not tolerating precedex) and fentanyl 25ml/hr for pain. Bilateral peripheral nerve block catheters inserted at bedside per anesthesia pain service today. See anesthesia pain consult note for plan of care. Minimal serosanguinous CT output throughout shift. Diuresing appropriately after lasix 40mg given. Pt and spouse/ family at bedside updated on current plan of care. All questions answered. Plans for bronch and extubation early tomorrow am. Will continue to monitor

## 2019-06-20 NOTE — PT/OT/SLP EVAL
"Occupational Therapy   Evaluation    Name: Juanita Ibarra  MRN: [id number]  Admitting Diagnosis:  S/P lung transplant 2 Days Post-Op    Recommendations:     Discharge Recommendations: home  Discharge Equipment Recommendations:  none  Barriers to discharge:       Assessment:     Juanita Ibarra is a 29 y.o. female with a medical diagnosis of S/P lung transplant.  Performance deficits affecting function: weakness, impaired self care skills, impaired balance, impaired functional mobilty, impaired endurance, gait instability, impaired cardiopulmonary response to activity.      Rehab Prognosis: Good; patient would benefit from acute skilled OT services to address these deficits and reach maximum level of function.       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  Patient found supine with blood pressure cuff, central line, telemetry, pulse ox (continuous), PCEA, chest tube, saleh catheter(10L HFNC) upon OT entry to " room.    General Precautions: Standard, fall   Orthopedic Precautions:    Braces:       Occupational Performance:    Bed Mobility:    · SBA supine to long sitting and scooting to EOB    Functional Mobility/Transfers:  · Patient completed Sit <> Stand Transfer with minimum assistance  with  no assistive device   · Patient completed Bed <> Chair Transfer using Step Transfer technique with minimum assistance with no assistive device  · Functional Mobility: Minimal A x 2 steps to chair    Activities of Daily Living:  · Grooming: set-up A     · Upper Body Dressing: minimum assistance    · Lower Body Dressing: contact guard assistance doffing socks  · Toileting: minimum assistance simulated    Cognitive/Visual Perceptual:  Oriented to: Person, Place, Time and Situation  Follows Commands/attention: Follows multistep  commands  Communication: clear/fluent  Memory:  No Deficits noted  Safety awareness/insight to disability: intact  Coping skills/emotional control: Appropriate to situation    Physical Exam:  Postural examination/scapula alignment:    -       No postural abnormalities identified  Sensation:    -       Intact  Upper Extremity Range of Motion:     -       Right Upper Extremity: WFL  -       Left Upper Extremity: WFL  Upper Extremity Strength:    -       Right Upper Extremity: WFL  -       Left Upper Extremity: WFL   Strength:    -       Right Upper Extremity: WFL  -       Left Upper Extremity: WFL  Fine Motor Coordination:    -       Intact  Gross motor coordination:   WFL    AMPAC 6 Click ADL:  AMPAC Total Score: 19    Treatment & Education:  Pt ed on OT POC  Pt ed on importance of OOB for lung expansion and overall increased strength and endurance  Education:    Patient left up in chair with all lines intact, call button in reach, RN notified and spouse present    GOALS:   Multidisciplinary Problems     Occupational Therapy Goals        Problem: Occupational Therapy Goal    Goal Priority Disciplines  Outcome Interventions   Occupational Therapy Goal     OT, PT/OT Ongoing (interventions implemented as appropriate)    Description:  Goals to be met by: 7/4/19     Patient will increase functional independence with ADLs by performing:    UE Dressing with Supervision.  LE Dressing with Supervision.  Grooming while standing at sink with Supervision.  Toileting from toilet with Supervision for hygiene and clothing management.   Toilet transfer to toilet with Supervision.                      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       Past Surgical History:   Procedure Laterality Date    ABDOMINAL SURGERY      peg tube     [id number]-[id number]-FPLGDIDYXJRH N/A 5/19/2015    Performed by [facility]    BIOPSY-BRONCHUS N/A 12/20/2016    Performed by Jake [facility] at [facility] OR [facility]    BRONCHOSCOPY Bilateral 12/11/2018    Performed by Francisca Morin DO at [facility] OR [facility]    BRONCHOSCOPY N/A 10/1/2018    Performed by Jake [facility] MD at [facility]    BRONCHOSCOPY Bilateral 12/20/2016    Performed by [facility] MD at [facility]    BRONCHOSCOPY - flexible bronchoscopy with probable tissue biopsy CPT 19442 N/A 7/21/2015    Performed by Jake [facility] MD at [facility] OR [facility]    BRONCHOSCOPY - flexible bronchoscopy with probable tissue biospy CPT 46231 N/A 10/20/2015    Performed by Jake [facility] MD at [facility] OR [facility]    BRONCHOSCOPY - flexible bronchoscopy with tissue biopsy CPT 21844 N/A 9/29/2015    Performed by Jake Alvarez  MD at [facility] OR [facility]    BRONCHOSCOPY - flexible bronchoscopy with tissue biopsy CPT 54743 N/A 5/26/2015    Performed by Jake [facility]varez MD at [facility] OR [facility]    BRONCHOSCOPY flexible bronchoscopy with tissue biopsy N/A 9/8/2014    Performed by Jake [facility]varez MD at [facility] OR [facility]    BRONCHOSCOPY flexible with possible tissue biopsy N/A 8/8/2014    Performed by Jake [facility]varez MD at [facility] OR [facility]    BRONCHOSCOPY, BAL, BIOPSIES N/A 3/20/2018    Performed by Jake [facility]varez MD at [facility] OR [facility]    BRONCHOSCOPY-FIBEROPTIC N/A 1/29/2016    Performed by Joann Cifuentes DO at [facility] OR [facility]    BRONCHOSCOPY; Bronchoscopy with BAL and transbronchial biopsies under general anesthesia N/A 5/29/2018    Performed by Jake [facility]varez MD at [facility] OR [facility]    CHOLECYSTECTOMY  2016    CHOLECYSTECTOMY-LAPAROSCOPIC N/A 7/22/2016    Performed by Joshua Goldberg, MD at [facility] OR [facility]    COLONOSCOPY N/A 5/3/2019    Performed by Juancho Rich MD at [facility] ENDO (2ND FLR)    ENDOMETRIAL ABLATION  2015    KJB    ETHMOIDECTOMY Bilateral 4/9/2019    Performed by Adin Burks III, MD at [facility] OR [facility]    FESS      FESS Bilateral 10/21/2013    Performed by Jared Whatley MD at [facility] OR [facility]    FESS, USING COMPUTER-ASSISTED NAVIGATION N/A 4/9/2019    Performed by Adin Burks III, MD at [facility] OR [facility]    FINE NEEDLE ASPIRATION (FNA)  of a cervical lymphadenopathy  1/29/2016    Performed by Joann Cifuentes DO at [facility] OR [facility]    flex bronchoscopy with probable tissue biopsy N/A 7/31/2014    Performed by [facility] Diagnostic Provider at [facility] OR [facility]    flexible bronchoscopy with tissue biopsy N/A 12/11/2014    Performed by Jake Alvarez MD at [facility] OR [facility]    HYSTERECTOMY  04/2017    [facility]    INJECTION-VOCAL CORD Left 6/24/2014    Performed by Jared Whatley MD at [facility] OR [facility]    ASPKJRPHI-VAFR-G-CATH to right chest and removal of portacath to left chests wall. Bilateral  6/24/2014    Performed by Zhen Dorado MD at [facility] OR [address] [place] [address]    LARYNX SURGERY  2016    LUNG TRANSPLANT Bilateral 6/12/14    MAXILLARY ANTROSTOMY  4/9/2019    Performed by Adin Burks III, MD at [facility] OR [address] [place] [address]    MYRINGOTOMY W/ TUBES Right 04/2017    MYRINGOTOMY WITH INSERTION OF PE TUBES Right 4/15/2017    Performed by Gary Null MD at [facility] OR [address] [place] [address]    PLACEMENT-TUBE-PEG  5/15/2014    Performed by David Moses MD at [facility] OR [address] [place] [address]    PORTACATH PLACEMENT Right     rt sc    REDO BILATERAL LUNG TRANSPLANT; CLAMSHELL Bilateral 6/18/2019    Performed by Jonathan Newby MD at [facility] OR [address] [place] [address]    REMOVAL-TUBE-PEG  5/15/2014    Performed by David Moses MD at [facility] OR [address] [place] [address]    RHC for lung re-transplant N/A 1/22/2019    Performed by Laura Rachel MD at [facility] CATH LAB    ROBOTIC ASSISTED LAPAROSCOPIC HYSTERECTOMY N/A 4/25/2017    Performed by Deny Dias Jr., MD at [facility] OR    SALPINGECTOMY Bilateral 2015    KJB    SALPINGECTOMY-LAPAROSCOPIC Bilateral 5/19/2015    Performed by Deny Dias Jr., MD at [facility] OR    SINUS SURGERY FUNCTIONAL ENDOSCOPIC WITH NAVIGATION Bilateral 4/3/2017    Performed by Cesar Olsen MD at [facility] OR [address] [place] [address]    SINUS SURGERY FUNCTIONAL ENDOSCOPIC WITH NAVIGATION, 43123, 67166, 96111, 58863 Bilateral 2/5/2015    Performed by Cesar Olsen MD at [facility] OR [address] [place] [address]    SPHENOIDECTOMY Bilateral 4/9/2019    Performed by Adin Burks III, MD at [facility] OR [address] [place] [address]    THYROPLASTY - Medialization laryngoplasty, cricothyroid subluxation, arytenoid repositioning Left 8/2/2016    Performed by Gary Null MD at [facility] OR [address] [place] [address]    TRANSPLANT-LUNG Bilateral 6/11/2014    Performed by Adolfo Huston MD at [facility] OR [address] [place] [address]       Time Tracking:     OT Date of Treatment: 06/20/19  OT Start Time: 1300  OT Stop Time: 1317  OT Total Time (min): 17 min    Billable Minutes:Evaluation 17    LUCIA Foy  6/20/2019

## 2019-06-20 NOTE — ANESTHESIA POST-OP PAIN MANAGEMENT
Administered 15cc of 0.2% Bupivacaine w/Epi and clonidine (could not find PF decadron) in both L and R TARA catheters.

## 2019-06-20 NOTE — PLAN OF CARE
Problem: Physical Therapy Goal  Goal: Physical Therapy Goal  Goals to be met by: 19    Patient will increase functional independence with mobility by performin. Supine to sit with Stand-by Assistance -not met  2. Sit to stand transfer with Supervision -not met  3. Gait  x 250 feet with Supervision -not met    Evaluation completed and goals appropriate. Sindy Koo, PT 2019

## 2019-06-20 NOTE — PROGRESS NOTES
Pt extubated to 10L H: HFNC Pt tolerated well.  Resting comfortably at this time.  Will continue to monitor.

## 2019-06-20 NOTE — PT/OT/SLP EVAL
Physical Therapy Evaluation    Patient Name:  Juanita Ibarra   MRN:  [id number]    Recommendations:     Discharge Recommendations:  (home no needs)   Discharge Equipment Recommendations: none   Barriers to discharge: None    Assessment:     Juanita Ibarra is a 29 y.o. female admitted with a medical diagnosis of S/P lung transplant.  She presents with the following impairments/functional limitations:  gait instability, impaired balance, impaired endurance, impaired functional mobilty pt narciso treatment well and will benefit from skilled PT 4x/wk to progress physically. Pt will be able to discharge home with no therapy or DME needs when medically stable. Pt is s/p Re-do B lung transplant 6/18/19.    Rehab Prognosis: Good; patient would benefit from acute skilled PT services to address these deficits and reach maximum level of function.    Recent Surgery: Procedure(s) (LRB):  REDO BILATERAL LUNG TRANSPLANT; CLAMSHELL (Bilateral) 2 Days Post-Op    Plan:     During this hospitalization, patient to be seen 4 x/week to address the identified rehab impairments via gait training, therapeutic activities and progress toward the following goals:    · Plan of Care Expires:  07/17/19    Subjective     Chief Complaint: pt c/o pain during treatment.   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 [address] [facility]   Prior to admission, patients level of function was Independent.  Equipment used at home: wheelchair, oxygen.  DME owned (not currently used): none.  Upon discharge, patient will have assistance from  and family. .    Objective:     Communicated with nurse prior to session.  Patient found supine with  telemetry, arterial line, pulse ox (continuous), blood pressure cuff, SCD, central line, oxygen, chest tube, saleh catheter  upon PT entry to room.    General Precautions: Standard, fall   Orthopedic Precautions:    Braces:       Exams:  · Cognitive Exam:  Patient is oriented to Person, Place, Time and Situation  · RLE ROM: WFL  · RLE Strength: WFL  · LLE ROM: WFL  · LLE Strength: WFL    Functional Mobility:  · Bed Mobility:  Pt needed verbal cues for hand placement and sequencing for functional mobility. Pt does not have sternal precautions.    · Rolling Left:  stand by assistance  · Supine to Sit: stand by assistance  ·   · Transfers:     · Sit to Stand:  minimum assistance with no AD  · Bed to Chair: minimum assistance with  no AD  using  Stand Pivot        AM-PAC 6 CLICK MOBILITY  Total Score:18     Patient left up in chair with all lines intact, call button in reach and  present.    GOALS:   Multidisciplinary Problems     Physical Therapy Goals        Problem: Physical Therapy Goal    Goal Priority Disciplines Outcome Goal Variances Interventions   Physical Therapy Goal     PT, PT/OT      Description:  Goals to be met by: 19    Patient will increase functional independence with mobility by performin. Supine to sit with Stand-by Assistance -not met  2. Sit to stand transfer with Supervision -not met  3. Gait  x 250 feet with Supervision -not met                      History:     Past Medical History:   Diagnosis Date    Arthritis     Blood transfusion     Bronchiolitis obliterans syndrome 2016    Cystic fibrosis of the lung     Ear infection     Hypertension     Migraine headache     MRSA (methicillin resistant Staphylococcus aureus) carrier     Osteopenia     Other specified disease of pancreas     Pain disorder     Seizures     Sinusitis, chronic     Vocal cord paralysis 2014    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       Past Surgical History:   Procedure Laterality Date    ABDOMINAL  SURGERY      peg tube     STIPJGDB-[id number]-[id number] N/A 5/19/2015    Performed by [facility]y Dias Jr[facility] MD at [facility] OR    BIOPSY-BRONCHUS N/A 12/20/2016    Performed by Jake Alvarez MD at [facility] OR 2ND [facility]    BRONCHOSCOPY Bilateral 12/11/2018    Performed by Francisca Morin DO at [facility] OR [facility]    BRONCHOSCOPY N/A 10/1/2018    Performed by Jake Alvarez MD at [facility] OR [facility]    BRONCHOSCOPY Bilateral 12/20/2016    Performed by Jake Alvarez MD at [facility] OR [facility]    BRONCHOSCOPY - flexible bronchoscopy with probable tissue biopsy CPT 56306 N/A 7/21/2015    Performed by Jake Alvarez MD at [facility] OR [facility]    BRONCHOSCOPY - flexible bronchoscopy with probable tissue biospy CPT 28999 N/A 10/20/2015    Performed by Jake Alvarez MD at [facility] OR [facility]    BRONCHOSCOPY - flexible bronchoscopy with tissue biopsy CPT 15931 N/A 9/29/2015    Performed by Jake Alvarez MD at [facility] OR 2ND [facility]    BRONCHOSCOPY - flexible bronchoscopy with tissue biopsy CPT 91163 N/A 5/26/2015    Performed by Jake Alvarez MD at [facility] OR 1ST FLR    BRONCHOSCOPY flexible bronchoscopy with tissue biopsy N/A 9/8/2014    Performed by Jake Alvarez MD at [facility] OR 2ND [facility]LR    BRONCHOSCOPY flexible with possible tissue biopsy N/A 8/8/2014    Performed by Jake Alvarez MD at [facility] OR 2ND [facility]LR    BRONCHOSCOPY, BAL, BIOPSIES N/A 3/20/2018    Performed by Jake Alvarez MD at [facility] OR 2ND [facility]LR    BRONCHOSCOPY-FIBEROPTIC N/A 1/29/2016    Performed by Joann Cifuentes DO at [facility] OR 2ND [facility]LR    BRONCHOSCOPY; Bronchoscopy with BAL and transbronchial biopsies under general anesthesia N/A 5/29/2018    Performed by Jake Alvarez MD at [facility] OR 2ND FLR    CHOLECYSTECTOMY  2016    CHOLECYSTECTOMY-LAPAROSCOPIC N/A 7/22/2016    Performed by Joshua Goldberg, MD at [facility] OR 2ND FLR    COLONOSCOPY N/A 5/3/2019    Performed by Juancho Rich MD at [facility] ENDO (2ND FLR)    ENDOMETRIAL ABLATION  2015     KJB    ETHMOIDECTOMY Bilateral 4/9/2019    [facility]    FESS      FESS Bilateral 10/21/2013    Performed by Jared Whatley MD at [facility] OR [address] [place] [address]    FESS, USING COMPUTER-ASSISTED NAVIGATION N/A 4/9/2019    Performed by Adin Burks III, MD at [facility] OR [address] [place] [address]    FINE NEEDLE ASPIRATION (FNA)  of a cervical lymphadenopathy  1/29/2016    Performed by Joann Cifuentes DO at [facility] OR [address] [place] [address]    flex bronchoscopy with probable tissue biopsy N/A 7/31/2014    Performed by [facility] Diagnostic Provider at [facility] OR [address] [place] [address]    flexible bronchoscopy with tissue biopsy N/A 12/11/2014    [facility]    HYSTERECTOMY  04/2017    [facility]    INJECTION-VOCAL CORD Left 6/24/2014    [facility]    FKHMSAIVY-RMSH-L-CATH to right chest and removal of portacath to left chests wall. Bilateral 6/24/2014    Performed by Zhen Dorado MD at [facility] OR 2ND [facility]LR    LARYNX SURGERY  2016    LUNG TRANSPLANT Bilateral 6/12/14    MAXILLARY ANTROSTOMY  4/9/2019    Performed by Adin Burks III, MD at [facility] OR [address] [place] [address]    MYRINGOTOMY W/ TUBES Right 04/2017    MYRINGOTOMY WITH INSERTION OF PE TUBES Right 4/15/2017    [facility]    PLACEMENT-TUBE-PEG  5/15/2014    [facility]    PORTACATH PLACEMENT Right     rt sc    REDO BILATERAL LUNG TRANSPLANT; CLAMSHELL Bilateral 6/18/2019    [facility]    REMOVAL-TUBE-PEG  5/15/2014    Performed by David Moses MD at [facility] OR 2ND [facility]LR    RHC for lung re-transplant N/A 1/22/2019    Performed by Laura Rachel MD at [facility] CATH LAB    ROBOTIC ASSISTED LAPAROSCOPIC HYSTERECTOMY N/A 4/25/2017    Performed by [facility]y Dias Jr., MD at [facility] OR    SALPINGECTOMY Bilateral 2015    KJB    SALPINGECTOMY-LAPAROSCOPIC Bilateral 5/19/2015     Performed by [facility]y Dias [facility] MD at [facility] OR    SINUS SURGERY FUNCTIONAL ENDOSCOPIC WITH NAVIGATION Bilateral 4/3/2017    [facility]    SINUS SURGERY FUNCTIONAL ENDOSCOPIC WITH NAVIGATION, 30276, 21293, 51902, 93959 Bilateral 2/5/2015    [facility]    SPHENOIDECTOMY Bilateral 4/9/2019    [facility]    THYROPLASTY - Medialization laryngoplasty, cricothyroid subluxation, arytenoid repositioning Left 8/2/2016    Performed by Gary Null MD at [facility] OR [address] [place] [address]    TRANSPLANT-LUNG Bilateral 6/11/2014    [facility]       Time Tracking:     PT Received On: 06/20/19  PT Start Time: 1303     PT Stop Time: 1320  PT Total Time (min): 17 min     Billable Minutes: Evaluation 8 min and Therapeutic Activity 9 min      Sindy Koo, PT  06/20/2019

## 2019-06-20 NOTE — PLAN OF CARE
Problem: Occupational Therapy Goal  Goal: Occupational Therapy Goal  Goals to be met by: 7/4/19     Patient will increase functional independence with ADLs by performing:    UE Dressing with Supervision.  LE Dressing with Supervision.  Grooming while standing at sink with Supervision.  Toileting from toilet with Supervision for hygiene and clothing management.   Toilet transfer to toilet with Supervision.    Outcome: Ongoing (interventions implemented as appropriate)  OT eval completed, and above goals established. LUCIA Foy  6/20/2019

## 2019-06-20 NOTE — ASSESSMENT & PLAN NOTE
POD#2 s/p bilateral lung transplant (re-transplant) for CARLOS EDUARDO. 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 Remains PGD 0. Underwent bronchoscopy without complications and extubated to NC on POD#2. LUT primary and CTS to manage chest tubes. Continue immunosuppression and ppx per protocol. Daily CXR.

## 2019-06-20 NOTE — ASSESSMENT & PLAN NOTE
BG goal 140 - 180. Discontinue CTS given transplant.         Rewrite insulin infusion at 0.4 u/hr.   BG monitoring every 4 hours and low dose correction scale.

## 2019-06-20 NOTE — PROGRESS NOTES
Dr. Morin at bedside to perform scheduled bronchoscopy. RN, MDs, and RT at bedside. VSS throughout procedure. Cultures collected and sent.

## 2019-06-20 NOTE — PT/OT/SLP PROGRESS
Speech Language Pathology      Juanita Ibarra  MRN: [id number]    Orders received and chart reviewed. Pt now s/p lung transplant extubated earlier this date. Pt also with a history of left true vocal fold paralysis warranting surgical intervention. SLP spoke with transplant team and per discussion all parties in agreement that pt would best benefit from a a modified barium swallow study to objectively rule out aspiration. Orders placed and study to be completed 06/21/19.     Aleida Gentile CCC-SLP

## 2019-06-20 NOTE — SUBJECTIVE & OBJECTIVE
"Interval HPI:   Overnight events: Remains in ICU. Extubated today. NAEON. BG reasonably well controlled on insulin infusion rates 0.3-2.1 u/hr. Solumedrol 177 mg x1 today; steroid taper per primary.   Eating:   NPO  Nausea: No  Hypoglycemia and intervention: No  Fever: 101.3  TPN and/or TF: No    BP (!) 143/92 (BP Location: Right arm, Patient Position: Lying)   Pulse 109   Temp 98.2 °F (36.8 °C) (Core Bladder)   Resp (!) 22   Ht 5' 1" (1.549 m)   Wt 66.4 kg (146 lb 6.2 oz)   LMP 10/02/2016   SpO2 100%   Breastfeeding? No   BMI 27.66 kg/m²     Labs Reviewed and Include    Recent Labs   Lab 06/20/19  0300   *   CALCIUM 7.8*   ALBUMIN 2.8*   PROT 5.3*      K 4.2   CO2 23      BUN 17   CREATININE 0.8   ALKPHOS 143*   ALT 25   AST 30   BILITOT 0.5     Lab Results   Component Value Date    WBC 11.22 06/20/2019    HGB 8.4 (L) 06/20/2019    HCT 25.0 (L) 06/20/2019    MCV 81 (L) 06/20/2019    PLT 82 (L) 06/20/2019     No results for input(s): TSH, FREET4 in the last 168 hours.  Lab Results   Component Value Date    HGBA1C 4.7 01/09/2019       Nutritional status:   Body mass index is 27.66 kg/m².  Lab Results   Component Value Date    ALBUMIN 2.8 (L) 06/20/2019    ALBUMIN 2.8 (L) 06/19/2019    ALBUMIN 3.5 06/17/2019     Lab Results   Component Value Date    PREALBUMIN 15 (L) 05/29/2014    PREALBUMIN 11 (L) 05/09/2014    PREALBUMIN 18 (L) 03/19/2014       Estimated Creatinine Clearance: 89.6 mL/min (based on SCr of 0.8 mg/dL).    Accu-Checks  Recent Labs     06/20/19  0101 06/20/19  0207 06/20/19  0256 06/20/19  0357 06/20/19  0457 06/20/19  0550 06/20/19  0709 06/20/19  0759 06/20/19  0854 06/20/19  1003   POCTGLUCOSE 164* 168* 168* 148* 152* 140* 208* 208* 228* 243*       Current Medications and/or Treatments Impacting Glycemic Control  Immunotherapy:    Immunosuppressants         Stop Route Frequency     mycophenolate mofetil 200 mg/mL suspension 500 mg      -- PER NG TUBE 2 times daily     " tacrolimus (PROGRAF) 1 mg/mL oral syringe      -- PER NG TUBE 2 times daily     basiliximab (SIMULECT) 20 mg in dextrose 5 % 50 mL chemo infusion      06/26 1514 IV Every 96 hours        Steroids:   Hormones (From admission, onward)    Start     Stop Route Frequency Ordered    06/21/19 0900  methylPREDNISolone sodium succinate injection 118 mg  (methylprednisolone panel)      06/22 0859 IV Daily 06/18/19 1411        Pressors:    Autonomic Drugs (From admission, onward)    Start     Stop Route Frequency Ordered    06/18/19 1515  EPINEPHrine (ADRENALIN) 5 mg in sodium chloride 0.9% 250 mL infusion     Question Answer Comment   Titrate by: (in mcg/kg/min) 0.05    Titrate interval: (in minutes) 5    Titrate to maintain: (SBP or MAP or Cardiac Index) MAP    Greater than: (in mmHg) 60    Cardiac index greater than: (in L/min) 2.2    Maximum dose: (in mcg/kg/min) 2        -- IV Continuous 06/18/19 1411        Hyperglycemia/Diabetes Medications:   Antihyperglycemics (From admission, onward)    Start     Stop Route Frequency Ordered    06/18/19 1800  insulin regular (Humulin R) 100 Units in sodium chloride 0.9% 100 mL infusion     Question:  Insulin Rate Adjustment (DO NOT MODIFY ANSWER)  Answer:  [email or web address]    -- IV Continuous 06/18/19 1527

## 2019-06-20 NOTE — ANESTHESIA POST-OP PAIN MANAGEMENT
Given sign out from day team re: TARA catheters. Concern for risk of exceeding daily maximum of local anesthetic running traditional 2/10 IB pump setup because of bilateral catheter placement. Goal of day team to transition to BID hand bolus of local at 7A/7P to better regulate amount but would like to maintain adequate pain control.     Catheter placement finished at 1341, 60cc of 0.375% Bupivacaine used to establish block- total of 225mg. Catheters run on pump using Ropi 0.2% at 2/10 IB for 2 hours- total of 48mg. Recommended daily 24hr total 400mg for peripheral block use, total so far 273mg.     Will hand bolus bilateral catheters with 15cc of 0.2% Bupivacaine with epi/clonidine/decadron with plan for day team to repeat same bolus at 7AM then transition to larger doses for subsequent boluses.

## 2019-06-20 NOTE — ANESTHESIA POST-OP PAIN MANAGEMENT
Acute Pain Service Progress Note      Surgery: TRANSPLANT, LUNG: bilateral (Bilateral)    Juanita Ibarra is a 29 y.o. female.    Interval Hx:     Post Op Day #: 2    Catheter type: Bilateral perineural TARA's    Infusion type: Hand boluses Q12H at 0700 and 1900    Problem List:    Active Hospital Problems    Diagnosis  POA    *S/P lung transplant [Z94.2]  Not Applicable    Awaiting organ transplant [Z76.82]  Not Applicable    Prophylactic immunotherapy [Z29.8]  Not Applicable    Acute blood loss anemia [D62]  No    Thrombocytopenia, unspecified [D69.6]  No    Prophylactic antibiotic [Z79.2]  Not Applicable    Immunosuppression [D89.9]  Yes    Hyperglycemia [R73.9]  Yes    Adrenal cortical steroids causing adverse effect in therapeutic use [T38.0X5A]  No    CF related Pancreatic insufficiency [K86.89]  Yes    Chronic pain with opiate use [G89.29]  Yes      Resolved Hospital Problems    Diagnosis Date Resolved POA    Awaiting organ transplant [Z76.82] 06/18/2019 Not Applicable    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 Pruritis: No   2. Nausea: No   3. Motor Blockade: No, 0=Ability to raise lower extremities off bed   4. Sedation: No, 1=awake and alert    Schedule Medications:    acetaminophen  1,000 mg Intravenous Q8H    [START ON 6/21/2019] acetaminophen  1,000 mg Oral Q8H    basiliximab (SIMULECT) chemo infusion  20 mg Intravenous Q96H    bisacodyl  10 mg Oral Daily    chlorhexidine  10 mL Mouth/Throat BID    dapsone  100 mg Per OG tube Daily    docusate  100 mg Per NG tube TID    fentaNYL        fluconazole 40 mg/ml  400 mg Per NG tube Daily    ganciclovir (CYTOVENE) IVPB  5 mg/kg Intravenous Q12H    lactulose  15 g Per OG tube TID     meropenem (MERREM) IVPB  1 g Intravenous Q8H    [START ON 6/21/2019] methylPREDNISolone sodium succinate  2 mg/kg Intravenous Daily    mupirocin  1 g Nasal BID    mycophenolate mofetil  500 mg Per NG tube BID    polyethylene glycol  17 g Per NG tube Daily    tacrolimus  0.5 mg Per NG tube BID    vancomycin (VANCOCIN) IVPB  1,250 mg Intravenous Q12H        Continuous Infusions:   dexmedetomidine (PRECEDEX) infusion 1 mcg/kg/hr (06/20/19 1200)    dextrose 5 % and 0.45 % NaCl with KCl 20 mEq 5 mL/hr at 06/18/19 1556    epinephrine infusion Stopped (06/18/19 1515)    fentanyl Stopped (06/20/19 1000)    insulin (HUMAN R) infusion (adults) 2.7 Units/hr (06/20/19 1100)    ketamine (KETALAR) infusion 10 mcg/kg/min (06/20/19 1200)    nicardipine Stopped (06/20/19 1200)        PRN Medications:  Dextrose 10% Bolus, Dextrose 10% Bolus, diphenhydrAMINE, HYDROmorphone, lactulose, levalbuterol, magnesium sulfate IVPB, metoclopramide HCl, midazolam, ondansetron, ondansetron, oxyCODONE, oxyCODONE, potassium chloride in water **AND** potassium chloride in water **AND** potassium chloride in water, promethazine (PHENERGAN) IVPB, sodium phosphate IVPB, sodium phosphate IVPB, sodium phosphate IVPB       Antibiotics:  Antibiotics (From admission, onward)    Start     Stop Route Frequency Ordered    06/19/19 0900  dapsone tablet 100 mg      -- OG Daily 06/18/19 1647    06/19/19 0500  vancomycin (VANCOCIN) 1250 mg in 5 % dextrose 250 mL IVPB      -- IV Every 12 hours (non-standard times) 06/18/19 1546    06/18/19 2100  mupirocin 2 % ointment 1 g      06/23 2059 Nasl 2 times daily 06/18/19 1411    06/18/19 1515  meropenem-0.9% sodium chloride 1 g/50 mL IVPB      -- IV Every 8 hours (non-standard times) 06/18/19 1411           Objective:  Catheter site: clean, dry, intact    Vital Signs (Most Recent):  Temp: 37.1 °C (98.8 °F) (06/20/19 1102)  Pulse: (!) 132 (06/20/19 1102)  Resp: (!) 61 (06/20/19 1102)  BP: 127/85 (06/20/19  1100)  SpO2: 98 % (06/20/19 1102) Vital Signs Range (Last 24H):  Temp:  [35.5 °C (95.9 °F)-38.4 °C (101.1 °F)]   Pulse:  []   Resp:  [1-74]   BP: (104-151)/()   SpO2:  [93 %-100 %]   Arterial Line BP: (111-169)/()      I & O (Last 24H):    Intake/Output Summary (Last 24 hours) at 6/20/2019 1216  Last data filed at 6/20/2019 1200  Gross per 24 hour   Intake 2355.35 ml   Output 5070 ml   Net -2714.65 ml       Physical Exam:  GA: Alert, comfortable, no acute distress.   Pulmonary: Clear to auscultation A/P/L. No wheezing, crackles, or rhonchi.  Cardiac: RRR S1 & S2 w/o rubs/murmurs/gallops.   Abdominal:Bowel sounds present. No tenderness to palpation or distension. No appreciable hepatosplenomegaly.   Skin: No jaundice, rashes, or visible lesions.    Laboratory:  CBC:   Recent Labs     06/19/19 2000 06/20/19  0300   WBC 10.43 11.22   RBC 2.98* 3.07*   HGB 8.3* 8.4*   HCT 24.6* 25.0*   PLT 78* 82*   MCV 83 81*   MCH 27.9 27.4   MCHC 33.7 33.6       BMP:   Recent Labs     06/19/19 2000 06/20/19  0300    137   K 4.0 4.2   CO2 23 23    104   BUN 16 17   CREATININE 0.8 0.8   * 160*   MG 2.0 1.9   PHOS 2.9 2.9   CALCIUM 7.9* 7.8*       Recent Labs     06/19/19  0100 06/19/19  0500 06/20/19  0300   INR 1.2 1.1 0.9   APTT 30.6 26.9 24.1       Anticoagulant: None.    Assessment:    Pain control: inadequate    Plan:  Patient now s/p bilateral lung transplant on 06/18/19; Hx of significant opioid use post previous lung transplant approximately five years ago.   - Patient with significant opioid requirement prior to surgery from previous lung transplant  - Remained intubated s/p lung transplant, requiring fentanyl gtt at 250 mcg/hr  - Also requiring IVPs and Precedex w/ Propofol for sedation  - S/P bilateral TARA catheter placement 06/19/19 w/ hand boluses Q12H at 0700 and 1900  - Patient placed on ketamine infusion at 10 mcg/kg/min, fentanyl weaned  - Patient now extubated and off fentanyl  gtt, however, remains in significant pain and splinting  - Plan for increase in ketamine to 15 mcg/kg/min and Dilaudid 1 mg IV Q2H PRN  - Patient remains NPO at this time, 1000 mg IV Tylenol Q8H for 24H awaiting SLP eval  - Will initiate multimodal regimen once patient passes swallow evaluation w/ SLP      Zbigniew Pennington MD  CA-2  [phone]

## 2019-06-20 NOTE — SUBJECTIVE & OBJECTIVE
Interval History/Significant Events: Remains intubated. Briefly required cardene gtt this AM, currently off. Tmax 101.3, WBC 11.2 from 8.2. -1.8 L over last 24 hours s/p 120 mg total of IV lasix.    Follow-up For: Procedure(s) (LRB):  REDO BILATERAL LUNG TRANSPLANT; BETTEL (Bilateral)    Post-Operative Day: 2 Days Post-Op    Objective:     Vital Signs (Most Recent):  Temp: 97.7 °F (36.5 °C) (06/20/19 0900)  Pulse: 91 (06/20/19 0900)  Resp: 14 (06/20/19 0900)  BP: 115/74 (06/20/19 0900)  SpO2: 99 % (06/20/19 0900) Vital Signs (24h Range):  Temp:  [95.9 °F (35.5 °C)-101.3 °F (38.5 °C)] 97.7 °F (36.5 °C)  Pulse:  [] 91  Resp:  [1-74] 14  SpO2:  [93 %-100 %] 99 %  BP: (102-151)/() 115/74  Arterial Line BP: ()/() 122/65     Weight: 66.4 kg (146 lb 6.2 oz)  Body mass index is 27.66 kg/m².      Intake/Output Summary (Last 24 hours) at 6/20/2019 1023  Last data filed at 6/20/2019 0900  Gross per 24 hour   Intake 2204.35 ml   Output 4910 ml   Net -2705.65 ml       Physical Exam      Constitutional: She appears well-developed and well-nourished.   Eyes: Pupils are equal, round, and reactive to light.   Neck: No JVD present.   Cardiovascular: Normal rate, regular rhythm and normal heart sounds.   Sternal incision site bandaged appropriately, C/D/I  Right chest tube with ss output  Left CT with ss output.   R IJ TLC   Pulmonary/Chest: Breath sounds normal.   Intubated   Abdominal: Soft. Bowel sounds are normal. She exhibits no distension.   Nursing note and vitals reviewed.    Vents:  Vent Mode: A/C (06/20/19 0814)  Set Rate: 12 bmp (06/20/19 0814)  Vt Set: 400 mL (06/19/19 1330)  PEEP/CPAP: 6 cmH20 (06/20/19 0814)  Oxygen Concentration (%): 41 (06/20/19 0900)  Peak Airway Pressure: 28 cmH2O (06/20/19 0814)  Plateau Pressure: 25 cmH20 (06/20/19 0814)  Total Ve: 5.94 mL (06/20/19 0814)  F/VT Ratio<105 (RSBI): (!) 36.14 (06/20/19 0814)    Lines/Drains/Airways     Central Venous Catheter Line                  Introducer right internal jugular -- days         Percutaneous Central Line Insertion/Assessment - triple lumen  right internal jugular -- days         Port A Cath Single Lumen 06/24/14 1200 right subclavian 1821 days         Percutaneous Central Line Insertion/Assessment - double lumen  06/18/19 0510 2 days          Drain                 NG/OG Tube 06/18/19 Center mouth 2 days         Urethral Catheter 06/18/19 0504 Non-latex;Straight-tip;Temperature probe 14 Fr. 2 days         Y Chest Tube 1 and 2 06/18/19 1151 1 Right Mediastinal 19 Fr. 2 Right Pleural 19 Fr. 1 day         Y Chest Tube 3 and 4 06/18/19 1151 3 Left Pleural 19 Fr. 4 Left Mediastinal 19 Fr. 1 day          Airway                 Airway - Non-Surgical 06/18/19 0626 Endotracheal Tube 2 days          Epidural Line                 Perineural Analgesia/Anesthesia Assessment (using dermatomes) 06/19/19 1311 less than 1 day          Arterial Line                 Arterial Line 06/18/19 0501 Left Brachial 2 days          Peripheral Intravenous Line                 Peripheral IV - Single Lumen 06/18/19 18 G Hand 2 days                Significant Labs:    CBC/Anemia Profile:  Recent Labs   Lab 06/19/19  0500  06/19/19  0908 06/19/19 2000 06/20/19  0300   WBC 8.20  --   --  10.43 11.22   HGB 7.7*  --   --  8.3* 8.4*   HCT 22.6*   < > 25* 24.6* 25.0*   PLT 78*  --   --  78* 82*   MCV 82  --   --  83 81*   RDW 15.9*  --   --  16.0* 15.6*    < > = values in this interval not displayed.        Chemistries:  Recent Labs   Lab 06/19/19  0500 06/19/19 2000 06/20/19  0300    136 137   K 4.3  4.3 4.0 4.2    105 104   CO2 22* 23 23   BUN 18 16 17   CREATININE 0.8 0.8 0.8   CALCIUM 7.9* 7.9* 7.8*   ALBUMIN  --  2.8* 2.8*   PROT  --  5.3* 5.3*   BILITOT  --  0.4 0.5   ALKPHOS  --  140* 143*   ALT  --  25 25   AST  --  33 30   MG 2.6 2.0 1.9   PHOS 3.0 2.9 2.9       All pertinent labs within the past 24 hours have been reviewed.    Significant Imaging:  I  have reviewed all pertinent imaging results/findings within the past 24 hours.

## 2019-06-20 NOTE — PROGRESS NOTES
[facility]  Critical Care - Surgery  Progress Note    Patient Name: Juanita Ibarra  MRN: [id number]  Admission Date: 6/17/2019  Hospital Length of Stay: 2 days  Code Status: Full Code  Attending Provider: Francisca Morin DO  Primary Care Provider: Primary Doctor No   Principal Problem: S/P lung transplant    Subjective:     Hospital/ICU Course:  No notes on file    Interval History/Significant Events: Remains intubated. Briefly required cardene gtt this AM, currently off. Tmax 101.3, WBC 11.2 from 8.2. -1.8 L over last 24 hours s/p 120 mg total of IV lasix.    Follow-up For: Procedure(s) (LRB):  REDO BILATERAL LUNG TRANSPLANT; CLAMSHELL (Bilateral)    Post-Operative Day: 2 Days Post-Op    Objective:     Vital Signs (Most Recent):  Temp: 97.7 °F (36.5 °C) (06/20/19 0900)  Pulse: 91 (06/20/19 0900)  Resp: 14 (06/20/19 0900)  BP: 115/74 (06/20/19 0900)  SpO2: 99 % (06/20/19 0900) Vital Signs (24h Range):  Temp:  [95.9 °F (35.5 °C)-101.3 °F (38.5 °C)] 97.7 °F (36.5 °C)  Pulse:  [] 91  Resp:  [1-74] 14  SpO2:  [93 %-100 %] 99 %  BP: (102-151)/() 115/74  Arterial Line BP: ()/() 122/65     Weight: 66.4 kg (146 lb 6.2 oz)  Body mass index is 27.66 kg/m².      Intake/Output Summary (Last 24 hours) at 6/20/2019 1023  Last data filed at 6/20/2019 0900  Gross per 24 hour   Intake 2204.35 ml   Output 4910 ml   Net -2705.65 ml       Physical Exam      Constitutional: She appears well-developed and well-nourished.   Eyes: Pupils are equal, round, and reactive to light.   Neck: No JVD present.   Cardiovascular: Normal rate, regular rhythm and normal heart sounds.   Sternal incision site bandaged appropriately, C/D/I  Right chest tube with ss output  Left CT with ss output.   R IJ TLC   Pulmonary/Chest: Breath sounds normal.   Intubated   Abdominal: Soft. Bowel sounds are normal. She exhibits no distension.   Nursing note and vitals reviewed.    Vents:  Vent Mode: A/C  (06/20/19 0814)  Set Rate: 12 bmp (06/20/19 0814)  Vt Set: 400 mL (06/19/19 1330)  PEEP/CPAP: 6 cmH20 (06/20/19 0814)  Oxygen Concentration (%): 41 (06/20/19 0900)  Peak Airway Pressure: 28 cmH2O (06/20/19 0814)  Plateau Pressure: 25 cmH20 (06/20/19 0814)  Total Ve: 5.94 mL (06/20/19 0814)  F/VT Ratio<105 (RSBI): (!) 36.14 (06/20/19 0814)    Lines/Drains/Airways     Central Venous Catheter Line                 Introducer right internal jugular -- days         Percutaneous Central Line Insertion/Assessment - triple lumen  right internal jugular -- days         Port A Cath Single Lumen 06/24/14 1200 right subclavian 1821 days         Percutaneous Central Line Insertion/Assessment - double lumen  06/18/19 0510 2 days          Drain                 NG/OG Tube 06/18/19 Center mouth 2 days         Urethral Catheter 06/18/19 0504 Non-latex;Straight-tip;Temperature probe 14 Fr. 2 days         Y Chest Tube 1 and 2 06/18/19 1151 1 Right Mediastinal 19 Fr. 2 Right Pleural 19 Fr. 1 day         Y Chest Tube 3 and 4 06/18/19 1151 3 Left Pleural 19 Fr. 4 Left Mediastinal 19 Fr. 1 day          Airway                 Airway - Non-Surgical 06/18/19 0626 Endotracheal Tube 2 days          Epidural Line                 Perineural Analgesia/Anesthesia Assessment (using dermatomes) 06/19/19 1311 less than 1 day          Arterial Line                 Arterial Line 06/18/19 0501 Left Brachial 2 days          Peripheral Intravenous Line                 Peripheral IV - Single Lumen 06/18/19 18 G Hand 2 days                Significant Labs:    CBC/Anemia Profile:  Recent Labs   Lab 06/19/19  0500  06/19/19  0908 06/19/19  2000 06/20/19  0300   WBC 8.20  --   --  10.43 11.22   HGB 7.7*  --   --  8.3* 8.4*   HCT 22.6*   < > 25* 24.6* 25.0*   PLT 78*  --   --  78* 82*   MCV 82  --   --  83 81*   RDW 15.9*  --   --  16.0* 15.6*    < > = values in this interval not displayed.        Chemistries:  Recent Labs   Lab 06/19/19  0500 06/19/19  2000  06/20/19  0300    136 137   K 4.3  4.3 4.0 4.2    105 104   CO2 22* 23 23   BUN 18 16 17   CREATININE 0.8 0.8 0.8   CALCIUM 7.9* 7.9* 7.8*   ALBUMIN  --  2.8* 2.8*   PROT  --  5.3* 5.3*   BILITOT  --  0.4 0.5   ALKPHOS  --  140* 143*   ALT  --  25 25   AST  --  33 30   MG 2.6 2.0 1.9   PHOS 3.0 2.9 2.9       All pertinent labs within the past 24 hours have been reviewed.    Significant Imaging:  I have reviewed all pertinent imaging results/findings within the past 24 hours.    Assessment/Plan:     * S/P lung transplant  kati Ibarra is a 29 y.o. female with CF s/p BL Lung Transplant June 2014 complicated by bronchiolitis obliterans and left vocal chord paralysis s/p L arytenopexy/laryngoplasty(implant) and cricoid subluxation. In addition has HTN, Anxiety, chronic opioid use. Does not have DM. She is s/p bilateral lung transplant 6/18/2019. A clamshell thoracotomy was used. Transported to SICU for recovery.     Neuro:   - Sedation:Propofol, precedex, and fentanyl  - Hx/o chronic opioid use in post-op patient    - Propofol ggt with PRN dilauded while sedated   - Consult acute pain service if needed for assistance with post-op pain control   - Consider bilateral TARA blocks     Pulmonary:   - Intubated:  Vent Mode: A/C  Oxygen Concentration (%):  [] 41  Resp Rate Total:  [12 br/min-37 br/min] 15 br/min  Vt Set:  [400 mL] 400 mL  PEEP/CPAP:  [6 cmH20-8 cmH20] 6 cmH20  Mean Airway Pressure:  [9.7 cmH20-15 cmH20] 9.7 cmH20  - ABGs PRN  - Daily CXR while intubated   - Management of vent per Lung Transplant Team  - Wean vent to possibly extubate today     Cardiac:  - Drips: Cardene for MAP < 80  - Keep MAPs >65  - HTN   -Hold home anti-hypertensives in acute setting   - Monitor chest tube output and character    Renal:   - Trend BUN/Cr  - Monitor UOP  - UOP good  - S/p lasix 40 mg x3     ID:   - Tmax 101.3, trend WBC  - Abx:Meropenem, IV Vanc, fluconazole, ganciclovir  - Give benadryll prior  to Vanc administration to prevent red man syndrome  - Will continue to monitor for signs of infection   - ID consulted, f/u recs    Immuno:  - Cellcept, tacro, steroid taper  - Immunomodulatory drugs per CTS  - Basiliximab q4 days x2 doses     Hem:  - Trend Hgb post-op  - No signs of active bleeding     Endocrine:  - Insulin gtt   - Monitor BG     FEN/GI:   - NPO   - Replace lytes PRN    Dispo:  - Continue ICU Care  - Vent management per lung transplant      Primary:   CTS        Critical care was time spent personally by me on the following activities: development of treatment plan with patient or surrogate and bedside caregivers, discussions with consultants, evaluation of patient's response to treatment, examination of patient, ordering and performing treatments and interventions, ordering and review of laboratory studies, ordering and review of radiographic studies, pulse oximetry, re-evaluation of patient's condition.  This critical care time did not overlap with that of any other provider or involve time for any procedures.     Pradip Snyder MD  Critical Care - Surgery  [facility]

## 2019-06-20 NOTE — PROGRESS NOTES
"[facility]  Endocrinology  Progress Note    Admit Date: 6/17/2019     Reason for Consult: Management of  Hyperglycemia and CF related pancreatic insufficiency.     Surgical Procedure and Date: lung transplant in 2014; 6/18/19    HPI:   Patient is a 29 y.o. female with a diagnosis of CF, HTN, migraine headache, and osteopenia. 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 No personal history of DM. Endocrinology consulted for BG management.     Lab Results   Component Value Date    HGBA1C 4.7 01/09/2019                 Interval HPI:   Overnight events: Remains in ICU. Extubated today. NAEON. BG reasonably well controlled on insulin infusion rates 0.3-2.1 u/hr. Solumedrol 177 mg x1 today; steroid taper per primary.   Eating:   NPO  Nausea: No  Hypoglycemia and intervention: No  Fever: 101.3  TPN and/or TF: No    BP (!) 143/92 (BP Location: Right arm, Patient Position: Lying)   Pulse 109   Temp 98.2 °F (36.8 °C) (Core Bladder)   Resp (!) 22   Ht 5' 1" (1.549 m)   Wt 66.4 kg (146 lb 6.2 oz)   LMP 10/02/2016   SpO2 100%   Breastfeeding? No   BMI 27.66 kg/m²      Labs Reviewed and Include    Recent Labs   Lab 06/20/19  0300   *   CALCIUM 7.8*   ALBUMIN 2.8*   PROT 5.3*      K 4.2   CO2 23      BUN 17   CREATININE 0.8   ALKPHOS 143*   ALT 25   AST 30   BILITOT 0.5     Lab Results   Component Value Date    WBC 11.22 06/20/2019    HGB 8.4 (L) 06/20/2019    HCT 25.0 (L) 06/20/2019    MCV 81 (L) 06/20/2019    PLT 82 (L) 06/20/2019     No results for input(s): TSH, FREET4 in the last 168 hours.  Lab Results   Component Value Date    HGBA1C 4.7 01/09/2019       Nutritional status:   Body mass index is 27.66 kg/m².  Lab Results   Component Value Date    ALBUMIN 2.8 (L) 06/20/2019    ALBUMIN 2.8 (L) 06/19/2019    ALBUMIN 3.5 06/17/2019     Lab Results   Component Value Date    PREALBUMIN 15 (L) 05/29/2014    PREALBUMIN 11 (L) " 05/09/2014    PREALBUMIN 18 (L) 03/19/2014       Estimated Creatinine Clearance: 89.6 mL/min (based on SCr of 0.8 mg/dL).    Accu-Checks  Recent Labs     06/20/19  0101 06/20/19  0207 06/20/19  0256 06/20/19  0357 06/20/19  0457 06/20/19  0550 06/20/19  0709 06/20/19  0759 06/20/19  0854 06/20/19  1003   POCTGLUCOSE 164* 168* 168* 148* 152* 140* 208* 208* 228* 243*       Current Medications and/or Treatments Impacting Glycemic Control  Immunotherapy:    Immunosuppressants         Stop Route Frequency     mycophenolate mofetil 200 mg/mL suspension 500 mg      -- PER NG TUBE 2 times daily     tacrolimus (PROGRAF) 1 mg/mL oral syringe      -- PER NG TUBE 2 times daily     basiliximab (SIMULECT) 20 mg in dextrose 5 % 50 mL chemo infusion      06/26 1514 IV Every 96 hours        Steroids:   Hormones (From admission, onward)    Start     Stop Route Frequency Ordered    06/21/19 0900  methylPREDNISolone sodium succinate injection 118 mg  (methylprednisolone panel)      06/22 0859 IV Daily 06/18/19 1411        Pressors:    Autonomic Drugs (From admission, onward)    Start     Stop Route Frequency Ordered    06/18/19 1515  EPINEPHrine (ADRENALIN) 5 mg in sodium chloride 0.9% 250 mL infusion     Question Answer Comment   Titrate by: (in mcg/kg/min) 0.05    Titrate interval: (in minutes) 5    Titrate to maintain: (SBP or MAP or Cardiac Index) MAP    Greater than: (in mmHg) 60    Cardiac index greater than: (in L/min) 2.2    Maximum dose: (in mcg/kg/min) 2        -- IV Continuous 06/18/19 1411        Hyperglycemia/Diabetes Medications:   Antihyperglycemics (From admission, onward)    Start     Stop Route Frequency Ordered    06/18/19 1800  insulin regular (Humulin R) 100 Units in sodium chloride 0.9% 100 mL infusion     Question:  Insulin Rate Adjustment (DO NOT MODIFY ANSWER)  Answer:  [email or web address]    -- IV Continuous 06/18/19 1656          ASSESSMENT and  PLAN    * S/P lung transplant  Managed per LUT.   avoid hypoglycemia        Hyperglycemia  BG goal 140 - 180. Discontinue CTS given transplant.     Continue IV insulin infusion protocol  Requires intensive BG monitoring while on protocol         Adrenal cortical steroids causing adverse effect in therapeutic use  Glucocorticoids markedly increase  glucoses. Expect the steroid taper will help glucose control.         Prophylactic immunotherapy  May increase insulin resistance.       CF related Pancreatic insufficiency  May impact BG.           Amy Zapata NP  Endocrinology  [facility]

## 2019-06-20 NOTE — ASSESSMENT & PLAN NOTE
kati Ibarra is a 29 y.o. female with CF s/p BL Lung Transplant June 2014 complicated by bronchiolitis obliterans and left vocal chord paralysis s/p L arytenopexy/laryngoplasty(implant) and cricoid subluxation. In addition has HTN, Anxiety, chronic opioid use. Does not have DM. She is s/p bilateral lung transplant 6/18/2019. A clamshell thoracotomy was used. Transported to SICU for recovery.     Neuro:   - Sedation:Propofol, precedex, and fentanyl  - Hx/o chronic opioid use in post-op patient    - Propofol ggt with PRN dilauded while sedated   - Consult acute pain service if needed for assistance with post-op pain control   - Consider bilateral TARA blocks     Pulmonary:   - Intubated:  Vent Mode: A/C  Oxygen Concentration (%):  [] 41  Resp Rate Total:  [12 br/min-37 br/min] 15 br/min  Vt Set:  [400 mL] 400 mL  PEEP/CPAP:  [6 cmH20-8 cmH20] 6 cmH20  Mean Airway Pressure:  [9.7 cmH20-15 cmH20] 9.7 cmH20  - ABGs PRN  - Daily CXR while intubated   - Management of vent per Lung Transplant Team  - Wean vent to possibly extubate today     Cardiac:  - Drips: Cardene for MAP < 80  - Keep MAPs >65  - HTN   -Hold home anti-hypertensives in acute setting   - Monitor chest tube output and character    Renal:   - Trend BUN/Cr  - Monitor UOP  - UOP good  - S/p lasix 40 mg x3     ID:   - Tmax 101.3, trend WBC  - Abx:Meropenem, IV Vanc, fluconazole, ganciclovir  - Give benadryll prior to Vanc administration to prevent red man syndrome  - Will continue to monitor for signs of infection   - ID consulted, f/u recs    Immuno:  - Cellcept, tacro, steroid taper  - Immunomodulatory drugs per CTS  - Basiliximab q4 days x2 doses     Hem:  - Trend Hgb post-op  - No signs of active bleeding     Endocrine:  - Insulin gtt   - Monitor BG     FEN/GI:   - NPO   - Replace lytes PRN    Dispo:  - Continue ICU Care  - Vent management per lung transplant      Primary:   CTS

## 2019-06-20 NOTE — SUBJECTIVE & OBJECTIVE
Subjective:     Interval History: POD#2 s/p BLT (retransplant). Underwent bronchoscopy without complications and successfully extubated to 10L HFNC this morning. Pain well controlled with ketamine infusion. Cardene restarted.     Continuous Infusions:   dexmedetomidine (PRECEDEX) infusion 1 mcg/kg/hr (06/20/19 1200)    dextrose 5 % and 0.45 % NaCl with KCl 20 mEq 5 mL/hr at 06/18/19 1556    epinephrine infusion Stopped (06/18/19 1515)    fentanyl Stopped (06/20/19 1000)    insulin (HUMAN R) infusion (adults) 1.7 Units/hr (06/20/19 1200)    ketamine (KETALAR) infusion 10 mcg/kg/min (06/20/19 1200)    nicardipine Stopped (06/20/19 1200)     Scheduled Meds:   acetaminophen  1,000 mg Intravenous Q8H    [START ON 6/21/2019] acetaminophen  1,000 mg Oral Q8H    basiliximab (SIMULECT) chemo infusion  20 mg Intravenous Q96H    bisacodyl  10 mg Oral Daily    chlorhexidine  10 mL Mouth/Throat BID    dapsone  100 mg Per OG tube Daily    docusate  100 mg Per NG tube TID    fentaNYL        fluconazole 40 mg/ml  400 mg Per NG tube Daily    ganciclovir (CYTOVENE) IVPB  5 mg/kg Intravenous Q12H    lactulose  15 g Per OG tube TID    meropenem (MERREM) IVPB  1 g Intravenous Q8H    [START ON 6/21/2019] methylPREDNISolone sodium succinate  2 mg/kg Intravenous Daily    mupirocin  1 g Nasal BID    mycophenolate mofetil  500 mg Per NG tube BID    polyethylene glycol  17 g Per NG tube Daily    tacrolimus  0.5 mg Per NG tube BID    vancomycin (VANCOCIN) IVPB  1,250 mg Intravenous Q12H     PRN Meds:Dextrose 10% Bolus, Dextrose 10% Bolus, diphenhydrAMINE, HYDROmorphone, lactulose, levalbuterol, magnesium sulfate IVPB, metoclopramide HCl, midazolam, ondansetron, ondansetron, oxyCODONE, oxyCODONE, potassium chloride in water **AND** potassium chloride in water **AND** potassium chloride in water, promethazine (PHENERGAN) IVPB, sodium phosphate IVPB, sodium phosphate IVPB, sodium phosphate IVPB    Review of patient's  allergies indicates:   Allergen Reactions    Albuterol Palpitations    Colistin Anaphylaxis    Vancomycin analogues      Infusion reaction that does not resolve with slowing  Pt. States she can tolerate it when given with 50 mg Benadryl and ran over 3 hours    Neupogen [filgrastim] Other (See Comments)     Ostealgia after five daily doses of 300 mcg.      Bactrim [sulfamethoxazole-trimethoprim] Hives    Ceftazidime Hives     Pt stated can tolerate cefapine not ceftazidime    Ceftazidime     Dronabinol Other (See Comments)     Mental changes/hallucinations    Haldol [haloperidol lactate] Other (See Comments)     Seizure like activity    Nsaids (non-steroidal anti-inflammatory drug)      Cannot have due to lung transplant    Adhesive Rash     Cloth tape- please use tegaderm or paper tape    Aztreonam Rash    Ciprofloxacin Nausea And Vomiting     Projectile N/V, per patient.  Unwilling to retry therapy.       Review of Systems   Constitutional: Positive for fatigue.   HENT: Positive for sore throat. Negative for congestion, rhinorrhea and sinus pain.    Respiratory: Positive for cough. Negative for shortness of breath and wheezing.    Cardiovascular: Positive for chest pain. Negative for palpitations and leg swelling.   Gastrointestinal: Positive for abdominal distention and constipation. Negative for abdominal pain, nausea and vomiting.   Genitourinary: Negative for decreased urine volume.   Musculoskeletal: Positive for back pain.   Allergic/Immunologic: Positive for immunocompromised state.   Neurological: Positive for weakness.   Psychiatric/Behavioral: The patient is nervous/anxious.      Objective:   Physical Exam   Constitutional: She appears well-developed and well-nourished. No distress. She is sedated and not intubated.   HENT:   Head: Normocephalic and atraumatic.   Nose: Nose normal.   On HFNC   Eyes: Pupils are equal, round, and reactive to light. Conjunctivae and EOM are normal.   Neck: Neck  supple. No JVD present.   Right IJ CVC with dressing c/d/i; right subclavian port with dressing c/d/i   Cardiovascular: Normal rate and regular rhythm.   Murmur heard.  Pulmonary/Chest: She is not intubated. No respiratory distress. She has decreased breath sounds in the right lower field and the left lower field. She has no wheezes. She has no rhonchi. She has no rales.   Bilateral pleural and mediastinal chest tubes with sanguinous output, bilateral airleak   Abdominal: Soft. Bowel sounds are normal. She exhibits distension. There is no tenderness.   Obese abdomen   Genitourinary:   Genitourinary Comments: Goldberg to gravity   Musculoskeletal:   Sedated   Neurological:   Intubated and sedated   Skin: Skin is warm and dry. She is not diaphoretic. There is pallor.   Nursing note and vitals reviewed.        Vital Signs (Most Recent):  Temp: 98.2 °F (36.8 °C) (06/20/19 1200)  Pulse: (!) 127 (06/20/19 1200)  Resp: (!) 26 (06/20/19 1200)  BP: (!) 151/96 (06/20/19 1200)  SpO2: 100 % (06/20/19 1200) Vital Signs (24h Range):  Temp:  [95.9 °F (35.5 °C)-101.1 °F (38.4 °C)] 98.2 °F (36.8 °C)  Pulse:  [] 127  Resp:  [1-74] 26  SpO2:  [93 %-100 %] 100 %  BP: (104-151)/() 151/96  Arterial Line BP: (111-169)/() 145/88     Weight: 66.4 kg (146 lb 6.2 oz)  Body mass index is 27.66 kg/m².      Intake/Output Summary (Last 24 hours) at 6/20/2019 1228  Last data filed at 6/20/2019 1200  Gross per 24 hour   Intake 2355.35 ml   Output 5070 ml   Net -2714.65 ml       Ventilator Data:     Vent Mode: Spont  Oxygen Concentration (%):  [] 60  Resp Rate Total:  [12 br/min-37 br/min] 19 br/min  Vt Set:  [400 mL] 400 mL  PEEP/CPAP:  [5 cmH20-8 cmH20] 5 cmH20  Pressure Support:  [5 cmH20] 5 cmH20  Mean Airway Pressure:  [6.3 jcB83-72 cmH20] 6.3 cmH20    Hemodynamic Parameters:  CO:  [5.3 L/min] 5.3 L/min  CI:  [3.4 L/min/m2] 3.4 L/min/m2    Lines/Drains:       Port A Cath Single Lumen 06/24/14 1200 right subclavian (Active)    Accessed by: Radha Ortega RN 6/17/2019  8:00 PM   Dressing Type Transparent 6/17/2019  8:00 PM   Dressing Status Clean;Dry;Intact;Biopatch in place 6/17/2019  8:00 PM   Dressing Intervention Dressing reinforced 6/17/2019  8:00 PM   Dressing Change Due 06/24/19 6/17/2019  8:00 PM   Line Status No blood return;Saline locked;Flushed 6/17/2019  8:00 PM   Flush Performed Yes 6/17/2019  8:00 PM   Date to be Reflushed 06/18/19 6/17/2019  8:00 PM   Daily Line Review Performed 6/17/2019  8:00 PM   Type of Needle Nicole 6/17/2019  8:00 PM   Gauge 20 6/17/2019  8:00 PM   Needle Length 1 in 6/17/2019  8:00 PM   Needle Status Left in place 6/17/2019  8:00 PM   Needle Insertion Date 06/17/19 6/17/2019  8:00 PM   Needle Insertion Time 1900 6/17/2019  8:00 PM   Number of days: 1820            Percutaneous Central Line Insertion/Assessment - double lumen  06/18/19 0510 (Active)   Number of days: 0            Arterial Line 06/18/19 0501 Left Other (Comment) (Active)   Number of days: 0            Urethral Catheter 06/18/19 0504 Non-latex;Straight-tip;Temperature probe 14 Fr. (Active)   Number of days: 0            Y Chest Tube 1 and 2 06/18/19 1151 1 Right Mediastinal 19 Fr. 2 Left Mediastinal 19 Fr. (Active)   Number of days: 0            Y Chest Tube 3 and 4 06/18/19 1151 3 Right Pleural 19 Fr. 4 Left Pleural 19 Fr. (Active)   Number of days: 0       Significant Labs:  CBC:  Recent Labs   Lab 06/20/19  0300   WBC 11.22   RBC 3.07*   HGB 8.4*   HCT 25.0*   PLT 82*   MCV 81*   MCH 27.4   MCHC 33.6     BMP:  Recent Labs   Lab 06/20/19  0300      K 4.2      CO2 23   BUN 17   CREATININE 0.8   CALCIUM 7.8*      Tacrolimus Levels:  No results for input(s): TACROLIMUS in the last 72 hours.  Microbiology:  Microbiology Results (last 7 days)     Procedure Component Value Units Date/Time    Culture, Anaerobe [[id number]] Collected:  06/18/19 0930    Order Status:  Completed Specimen:  Tissue from Lung, Left Updated:  06/20/19  1040     Anaerobic Culture Culture in progress    Narrative:       Donor lung    Culture, Respiratory with Gram Stain [[id number]] Collected:  06/20/19 0808    Order Status:  Sent Specimen:  Respiratory from Bronchial Wash Updated:  06/20/19 0937    AFB Culture & Smear [[id number]] Collected:  06/20/19 0808    Order Status:  Sent Specimen:  Respiratory from Bronchial Wash Updated:  06/20/19 0936    Fungus culture [[id number]] Collected:  06/20/19 0808    Order Status:  Sent Specimen:  Respiratory from Bronchial Wash Updated:  06/20/19 0936    AFB Culture & Smear [[id number]] Collected:  06/18/19 0930    Order Status:  Completed Specimen:  Tissue from Lung, Left Updated:  06/19/19 2127     AFB Culture & Smear Culture in progress     AFB CULTURE STAIN No acid fast bacilli seen.    Narrative:       Donor lung    Aerobic culture [[id number]] Collected:  06/18/19 0930    Order Status:  Completed Specimen:  Tissue from Lung, Left Updated:  06/19/19 1212     Aerobic Bacterial Culture --     CANDIDA ALBICANS  Few      Narrative:       Donor lung    Urine culture [[id number]] Collected:  06/17/19 1753    Order Status:  Completed Specimen:  Urine, Clean Catch Updated:  06/19/19 0117     Urine Culture, Routine No significant growth    Narrative:       Indicated criteria for high risk culture:->Other  Other (specify):->awaiting lung transplant    Gram stain [[id number]] Collected:  06/18/19 0930    Order Status:  Completed Specimen:  Tissue from Lung, Left Updated:  06/18/19 1041     Gram Stain Result Moderate WBC's      No organisms seen    Narrative:       Donor lung    Fungus culture [[id number]] Collected:  06/18/19 0930    Order Status:  Sent Specimen:  Tissue from Lung, Left Updated:  06/18/19 0944          I have reviewed all pertinent labs within the past 24 hours.    Diagnostic Results:  Chest X-Ray: I personally reviewed the films and findings are:     X-Ray: Bilateral pattern of pulmonary infiltrates again noted, as well as  appearance of the right lung base suggesting volume loss, there is appearance suggesting mild improvement on the right and mild progression on the left.

## 2019-06-20 NOTE — PLAN OF CARE
"Problem: Adult Inpatient Plan of Care  Goal: Plan of Care Review  Outcome: Ongoing (interventions implemented as appropriate)  Dx: S/P lung transplant    Shift Events: VSS, vent settings: A/C, FiO2 40%, PEEP 6, Rate 16 with sats remaining 100%. No acute events. Pt to be extubated and bronched today.    Neuro: Arouses to Voice, Follows Commands and Moves All Extremities    Vital Signs: /82 (BP Location: Right arm, Patient Position: Lying)   Pulse 64   Temp 96.4 °F (35.8 °C) (Core Bladder)   Resp 16   Ht 5' 1" (1.549 m)   Wt 64.2 kg (141 lb 8.6 oz)   LMP 10/02/2016   SpO2 100%   Breastfeeding? No   BMI 26.74 kg/m²     Diet: NPO    Gtts: Propfol, Fentanyl, Precedex, Insulin and MIVF    Urine Output: Urinary Catheter  cc/hour    Drains: Chest Tube, total output 120 cc /  shift and NG/OG Tube 100 cc /  shift    Restraints Charted Q2     Labs/Accuchecks: Q12 CBC, CMP, mag, phos; Q1 accuchecks    Skin: CDI; heels, sacrum and elbows without breakdown         "

## 2019-06-20 NOTE — PROGRESS NOTES
[facility]  Lung Transplant  Progress Note - Critical Care    Patient Name: Juanita Ibarra  MRN: [id number]  Admission Date: 6/17/2019  Hospital Length of Stay: 2 days  Post-Operative Day: 1834 (Lung), 2 (Lung)  Attending Physician: Francisca Morin DO  Primary Care Provider: Primary Doctor No     Subjective:     Interval History: POD#2 s/p BLT (retransplant). Underwent bronchoscopy without complications and successfully extubated to 10L HFNC this morning. Pain well controlled with ketamine infusion. Cardene required briefly this morning.     Continuous Infusions:   dexmedetomidine (PRECEDEX) infusion 1 mcg/kg/hr (06/20/19 1200)    dextrose 5 % and 0.45 % NaCl with KCl 20 mEq 5 mL/hr at 06/18/19 1556    epinephrine infusion Stopped (06/18/19 1515)    fentanyl Stopped (06/20/19 1000)    insulin (HUMAN R) infusion (adults) 1.7 Units/hr (06/20/19 1200)    ketamine (KETALAR) infusion 10 mcg/kg/min (06/20/19 1200)    nicardipine Stopped (06/20/19 1200)     Scheduled Meds:   acetaminophen  1,000 mg Intravenous Q8H    [START ON 6/21/2019] acetaminophen  1,000 mg Oral Q8H    basiliximab (SIMULECT) chemo infusion  20 mg Intravenous Q96H    bisacodyl  10 mg Oral Daily    chlorhexidine  10 mL Mouth/Throat BID    dapsone  100 mg Per OG tube Daily    docusate  100 mg Per NG tube TID    fentaNYL        fluconazole 40 mg/ml  400 mg Per NG tube Daily    ganciclovir (CYTOVENE) IVPB  5 mg/kg Intravenous Q12H    lactulose  15 g Per OG tube TID    meropenem (MERREM) IVPB  1 g Intravenous Q8H    [START ON 6/21/2019] methylPREDNISolone sodium succinate  2 mg/kg Intravenous Daily    mupirocin  1 g Nasal BID    mycophenolate mofetil  500 mg Per NG tube BID    polyethylene glycol  17 g Per NG tube Daily    tacrolimus  0.5 mg Per NG tube BID    vancomycin (VANCOCIN) IVPB  1,250 mg Intravenous Q12H     PRN Meds:Dextrose 10% Bolus, Dextrose 10% Bolus, diphenhydrAMINE, HYDROmorphone,  lactulose, levalbuterol, magnesium sulfate IVPB, metoclopramide HCl, midazolam, ondansetron, ondansetron, oxyCODONE, oxyCODONE, potassium chloride in water **AND** potassium chloride in water **AND** potassium chloride in water, promethazine (PHENERGAN) IVPB, sodium phosphate IVPB, sodium phosphate IVPB, sodium phosphate IVPB    Review of patient's allergies indicates:   Allergen Reactions    Albuterol Palpitations    Colistin Anaphylaxis    Vancomycin analogues      Infusion reaction that does not resolve with slowing  Pt. States she can tolerate it when given with 50 mg Benadryl and ran over 3 hours    Neupogen [filgrastim] Other (See Comments)     Ostealgia after five daily doses of 300 mcg.      Bactrim [sulfamethoxazole-trimethoprim] Hives    Ceftazidime Hives     Pt stated can tolerate cefapine not ceftazidime    Ceftazidime     Dronabinol Other (See Comments)     Mental changes/hallucinations    Haldol [haloperidol lactate] Other (See Comments)     Seizure like activity    Nsaids (non-steroidal anti-inflammatory drug)      Cannot have due to lung transplant    Adhesive Rash     Cloth tape- please use tegaderm or paper tape    Aztreonam Rash    Ciprofloxacin Nausea And Vomiting     Projectile N/V, per patient.  Unwilling to retry therapy.       Review of Systems   Constitutional: Positive for fatigue.   HENT: Positive for sore throat. Negative for congestion, rhinorrhea and sinus pain.    Respiratory: Positive for cough. Negative for shortness of breath and wheezing.    Cardiovascular: Positive for chest pain. Negative for palpitations and leg swelling.   Gastrointestinal: Positive for abdominal distention and constipation. Negative for abdominal pain, nausea and vomiting.   Genitourinary: Negative for decreased urine volume.   Musculoskeletal: Positive for back pain.   Allergic/Immunologic: Positive for immunocompromised state.   Neurological: Positive for weakness.   Psychiatric/Behavioral: The  patient is nervous/anxious.      Objective:   Physical Exam   Constitutional: She appears well-developed and well-nourished. No distress. She is sedated and not intubated.   HENT:   Head: Normocephalic and atraumatic.   Nose: Nose normal.   On HFNC   Eyes: Pupils are equal, round, and reactive to light. Conjunctivae and EOM are normal.   Neck: Neck supple. No JVD present.   Right IJ CVC with dressing c/d/i; right subclavian port with dressing c/d/i   Cardiovascular: Normal rate and regular rhythm.   Murmur heard.  Pulmonary/Chest: She is not intubated. No respiratory distress. She has decreased breath sounds in the right lower field and the left lower field. She has no wheezes. She has no rhonchi. She has no rales.   Bilateral pleural and mediastinal chest tubes with sanguinous output, bilateral airleak   Abdominal: Soft. Bowel sounds are normal. She exhibits distension. There is no tenderness.   Obese abdomen   Genitourinary:   Genitourinary Comments: Goldberg to gravity   Musculoskeletal:   Sedated   Neurological:   Intubated and sedated   Skin: Skin is warm and dry. She is not diaphoretic. There is pallor.   Nursing note and vitals reviewed.        Vital Signs (Most Recent):  Temp: 98.2 °F (36.8 °C) (06/20/19 1200)  Pulse: (!) 127 (06/20/19 1200)  Resp: (!) 26 (06/20/19 1200)  BP: (!) 151/96 (06/20/19 1200)  SpO2: 100 % (06/20/19 1200) Vital Signs (24h Range):  Temp:  [95.9 °F (35.5 °C)-101.1 °F (38.4 °C)] 98.2 °F (36.8 °C)  Pulse:  [] 127  Resp:  [1-74] 26  SpO2:  [93 %-100 %] 100 %  BP: (104-151)/() 151/96  Arterial Line BP: (111-169)/() 145/88     Weight: 66.4 kg (146 lb 6.2 oz)  Body mass index is 27.66 kg/m².      Intake/Output Summary (Last 24 hours) at 6/20/2019 1228  Last data filed at 6/20/2019 1200  Gross per 24 hour   Intake 2355.35 ml   Output 5070 ml   Net -2714.65 ml       Ventilator Data:     Vent Mode: Spont  Oxygen Concentration (%):  [] 60  Resp Rate Total:  [12 br/min-37  br/min] 19 br/min  Vt Set:  [400 mL] 400 mL  PEEP/CPAP:  [5 cmH20-8 cmH20] 5 cmH20  Pressure Support:  [5 cmH20] 5 cmH20  Mean Airway Pressure:  [6.3 gqK94-64 cmH20] 6.3 cmH20    Hemodynamic Parameters:  CO:  [5.3 L/min] 5.3 L/min  CI:  [3.4 L/min/m2] 3.4 L/min/m2    Lines/Drains:       Port A Cath Single Lumen 06/24/14 1200 right subclavian (Active)   Accessed by: Radha Ortega RN 6/17/2019  8:00 PM   Dressing Type Transparent 6/17/2019  8:00 PM   Dressing Status Clean;Dry;Intact;Biopatch in place 6/17/2019  8:00 PM   Dressing Intervention Dressing reinforced 6/17/2019  8:00 PM   Dressing Change Due 06/24/19 6/17/2019  8:00 PM   Line Status No blood return;Saline locked;Flushed 6/17/2019  8:00 PM   Flush Performed Yes 6/17/2019  8:00 PM   Date to be Reflushed 06/18/19 6/17/2019  8:00 PM   Daily Line Review Performed 6/17/2019  8:00 PM   Type of Needle Nicole 6/17/2019  8:00 PM   Gauge 20 6/17/2019  8:00 PM   Needle Length 1 in 6/17/2019  8:00 PM   Needle Status Left in place 6/17/2019  8:00 PM   Needle Insertion Date 06/17/19 6/17/2019  8:00 PM   Needle Insertion Time 1900 6/17/2019  8:00 PM   Number of days: 1820            Percutaneous Central Line Insertion/Assessment - double lumen  06/18/19 0510 (Active)   Number of days: 0            Arterial Line 06/18/19 0501 Left Other (Comment) (Active)   Number of days: 0            Urethral Catheter 06/18/19 0504 Non-latex;Straight-tip;Temperature probe 14 Fr. (Active)   Number of days: 0            Y Chest Tube 1 and 2 06/18/19 1151 1 Right Mediastinal 19 Fr. 2 Left Mediastinal 19 Fr. (Active)   Number of days: 0            Y Chest Tube 3 and 4 06/18/19 1151 3 Right Pleural 19 Fr. 4 Left Pleural 19 Fr. (Active)   Number of days: 0       Significant Labs:  CBC:  Recent Labs   Lab 06/20/19  0300   WBC 11.22   RBC 3.07*   HGB 8.4*   HCT 25.0*   PLT 82*   MCV 81*   MCH 27.4   MCHC 33.6     BMP:  Recent Labs   Lab 06/20/19  0300      K 4.2      CO2 23   BUN 17    CREATININE 0.8   CALCIUM 7.8*      Tacrolimus Levels:  No results for input(s): TACROLIMUS in the last 72 hours.  Microbiology:  Microbiology Results (last 7 days)     Procedure Component Value Units Date/Time    Culture, Anaerobe [[id number]] Collected:  06/18/19 0930    Order Status:  Completed Specimen:  Tissue from Lung, Left Updated:  06/20/19 1040     Anaerobic Culture Culture in progress    Narrative:       Donor lung    Culture, Respiratory with Gram Stain [[id number]] Collected:  06/20/19 0808    Order Status:  Sent Specimen:  Respiratory from Bronchial Wash Updated:  06/20/19 0937    AFB Culture & Smear [[id number]] Collected:  06/20/19 0808    Order Status:  Sent Specimen:  Respiratory from Bronchial Wash Updated:  06/20/19 0936    Fungus culture [[id number]] Collected:  06/20/19 0808    Order Status:  Sent Specimen:  Respiratory from Bronchial Wash Updated:  06/20/19 0936    AFB Culture & Smear [[id number]] Collected:  06/18/19 0930    Order Status:  Completed Specimen:  Tissue from Lung, Left Updated:  06/19/19 2127     AFB Culture & Smear Culture in progress     AFB CULTURE STAIN No acid fast bacilli seen.    Narrative:       Donor lung    Aerobic culture [[id number]] Collected:  06/18/19 0930    Order Status:  Completed Specimen:  Tissue from Lung, Left Updated:  06/19/19 1212     Aerobic Bacterial Culture --     CANDIDA ALBICANS  Few      Narrative:       Donor lung    Urine culture [[id number]] Collected:  06/17/19 1753    Order Status:  Completed Specimen:  Urine, Clean Catch Updated:  06/19/19 0117     Urine Culture, Routine No significant growth    Narrative:       Indicated criteria for high risk culture:->Other  Other (specify):->awaiting lung transplant    Gram stain [[id number]] Collected:  06/18/19 0930    Order Status:  Completed Specimen:  Tissue from Lung, Left Updated:  06/18/19 1041     Gram Stain Result Moderate WBC's      No organisms seen    Narrative:       Donor lung    Fungus culture  [[id number]] Collected:  06/18/19 0930    Order Status:  Sent Specimen:  Tissue from Lung, Left Updated:  06/18/19 0943          I have reviewed all pertinent labs within the past 24 hours.    Diagnostic Results:  Chest X-Ray: I personally reviewed the films and findings are:     X-Ray: Bilateral pattern of pulmonary infiltrates again noted, as well as appearance of the right lung base suggesting volume loss, there is appearance suggesting mild improvement on the right and mild progression on the left.        Assessment/Plan:     * S/P lung transplant  POD#2 s/p bilateral lung transplant (re-transplant) for CARLOS EDUARDO. 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 Remains PGD 0. Underwent bronchoscopy without complications and extubated to HFNC on POD#2. LUT primary and CTS to manage chest tubes. Continue immunosuppression and ppx per protocol. Daily CXR.     Immunosuppression  Previously on tacrolimus, MMF, and daily prednisone. Received solumedrol in OR and basiliximab upon arrival to ICU. Repeat basiliximab on POD#4. Continue MMF, tacrolimus, and steroid taper. Daily tacrolimus levels and adjust dose as needed.     Prophylactic antibiotic  CMV D-/R+. Continue OIP with ganciclovir, fluconazole, and dapsone. On Merrem/Vanc for routine surgical prophylaxis based on previous sensitivities. Will follow up on BAL from 6/20. ID following appreciate recs.     Thrombocytopenia, unspecified  Expected post-operatively. Continue to monitor on serial CBC.    Acute blood loss anemia  Received total of 6u PRBCs, 2u plts, 1 cryo, and 4 FFP angie-operatively. Will be conservative with future transfusions.     Adrenal cortical steroids causing adverse effect in therapeutic use  Endocrinology consulted, appreciate recs.    Chronic pain with opiate use  Anesthesia consulted for pain management, appreciate recs. Continue aggressive bowel regimen.     CF related Pancreatic insufficiency  Will start pancreatic enzymes once diet is started.          Preventive Measures: Nutrition: Goal: advance as tolerated, DVT: discontinue prophyllaxis, Reposition: per unit routine, Physical Therapy: as tolerated    Counseling/Consultation:I discussed the case with Dr. Morin.    Shama Canales PA-C  Lung Transplant  [facility]

## 2019-06-21 PROBLEM — F05 ACUTE HYPERACTIVE DELIRIUM DUE TO ANOTHER MEDICAL CONDITION: Status: ACTIVE | Noted: 2019-01-01

## 2019-06-21 NOTE — SUBJECTIVE & OBJECTIVE
Interval History/Significant Events:    Extubated to 10 L HFNC yesterday, which she has remained stable on since. Currently off cardene gtt.  Pain control and anxiety have been an issue overnight.     Follow-up For: Procedure(s) (LRB):  REDO BILATERAL LUNG TRANSPLANT; BETTEL (Bilateral)    Post-Operative Day: 2 Days Post-Op    Objective:     Vital Signs (Most Recent):  Temp: 99.5 °F (37.5 °C) (06/21/19 0934)  Pulse: (!) 114 (06/21/19 0934)  Resp: 16 (06/21/19 0934)  BP: (!) 137/92 (06/21/19 0934)  SpO2: 100 % (06/21/19 0934) Vital Signs (24h Range):  Temp:  [97.9 °F (36.6 °C)-99.9 °F (37.7 °C)] 99.5 °F (37.5 °C)  Pulse:  [] 114  Resp:  [16-66] 16  SpO2:  [98 %-100 %] 100 %  BP: (127-170)/() 137/92  Arterial Line BP: (145-163)/() 161/100     Weight: 69.2 kg (152 lb 8.9 oz)  Body mass index is 28.83 kg/m².      Intake/Output Summary (Last 24 hours) at 6/21/2019 0944  Last data filed at 6/21/2019 0900  Gross per 24 hour   Intake 1451.7 ml   Output 3610 ml   Net -2158.3 ml       Physical Exam        Constitutional: She appears well-developed and well-nourished.   Eyes: Pupils are equal, round, and reactive to light.   Neck: No JVD present.   Cardiovascular: Normal rate, regular rhythm and normal heart sounds.   Sternal incision site bandaged appropriately, C/D/I  Right chest tube with ss output  Left CT with ss output.   R IJ TLC   Pulmonary/Chest: Breath sounds normal.   On NC  Abdominal: Soft. Bowel sounds are normal. She exhibits no distension.   Nursing note and vitals reviewed.    Vents:  Vent Mode: Spont (06/20/19 1102)  Set Rate: 12 bmp (06/20/19 0814)  Vt Set: 400 mL (06/19/19 1330)  Pressure Support: 5 cmH20 (06/20/19 1102)  PEEP/CPAP: 5 cmH20 (06/20/19 1102)  Oxygen Concentration (%): 60 (06/20/19 1556)  Peak Airway Pressure: 11 cmH2O (06/20/19 1102)  Plateau Pressure: 24 cmH20 (06/20/19 1102)  Total Ve: 5.05 mL (06/20/19 1102)  Negative Inspiratory Force (cm H2O): -34 (06/20/19  1050)  F/VT Ratio<105 (RSBI): 140.55 (06/20/19 1102)    Lines/Drains/Airways     Central Venous Catheter Line                 Introducer right internal jugular -- days         Percutaneous Central Line Insertion/Assessment - triple lumen  right internal jugular -- days         Port A Cath Single Lumen 06/24/14 1200 right subclavian 1822 days         Percutaneous Central Line Insertion/Assessment - double lumen  06/18/19 0510 3 days          Drain                 Urethral Catheter 06/18/19 0504 Non-latex;Straight-tip;Temperature probe 14 Fr. 3 days         Y Chest Tube 1 and 2 06/18/19 1151 1 Right Mediastinal 19 Fr. 2 Right Pleural 19 Fr. 2 days         Y Chest Tube 3 and 4 06/18/19 1151 3 Left Pleural 19 Fr. 4 Left Mediastinal 19 Fr. 2 days          Epidural Line                 Perineural Analgesia/Anesthesia Assessment (using dermatomes) 06/19/19 1311 1 day          Peripheral Intravenous Line                 Peripheral IV - Single Lumen 06/18/19 18 G Hand 3 days                Significant Labs:    CBC/Anemia Profile:  Recent Labs   Lab 06/20/19 0300 06/20/19  1535 06/21/19  0400   WBC 11.22 13.16* 13.60*   HGB 8.4* 8.9* 8.5*   HCT 25.0* 26.8* 26.6*   PLT 82* 97* 113*   MCV 81* 84 86   RDW 15.6* 15.8* 15.9*        Chemistries:  Recent Labs   Lab 06/19/19 2000 06/20/19  0300 06/20/19  1535 06/21/19  0400    137 138 141   K 4.0 4.2 4.5 4.6    104 103 104   CO2 23 23 28 29   BUN 16 17 16 13   CREATININE 0.8 0.8 0.8 0.7   CALCIUM 7.9* 7.8* 8.3* 8.4*   ALBUMIN 2.8* 2.8* 2.9* 2.9*   PROT 5.3* 5.3* 6.0 6.1   BILITOT 0.4 0.5 0.4 0.5   ALKPHOS 140* 143* 160* 161*   ALT 25 25 29 31   AST 33 30 37 44*   MG 2.0 1.9 2.3 2.1   PHOS 2.9 2.9 3.2  --        All pertinent labs within the past 24 hours have been reviewed.    Significant Imaging:  I have reviewed all pertinent imaging results/findings within the past 24 hours.

## 2019-06-21 NOTE — ASSESSMENT & PLAN NOTE
- Likely multifactorial including post-op anesthesia, dissociative narcotics, ICU delirium  - discussed with Anes changing pain regimen.  Consider use of atypical antipsychotics  - minimize benzo use, although chronic medication for patient

## 2019-06-21 NOTE — ANESTHESIA PROCEDURE NOTES
Intubation    Diagnosis: respiratory failure  Patient location during procedure: ICU  Procedure start time: 6/21/2019 1:15 PM  Timeout: 6/21/2019 1:15 PM  Procedure end time: 6/21/2019 1:25 PM  Staffing  Anesthesiologist: Jake Reynoso MD  Resident/CRNA: Victor M Singh MD  Performed: resident/CRNA   Anesthesiologist was present at the time of the procedure.  Preanesthetic Checklist  Completed: patient identified, site marked, surgical consent, pre-op evaluation, timeout performed, IV checked, risks and benefits discussed, monitors and equipment checked and anesthesia consent given  Intubation  Indication: respiratory failure  Pre-oxygenation. Induction: intravenous, mask ventilation: easy mask.  Intubation: postinduction, laryngoscopy glidescope, Ej 3.  Endotracheal Tube: oral, 8.0 mm ID, cuffed (inflated to minimal occlusive pressure)  Attempts: 1, Grade I - full view of cords  Complicating Factors: none  Tube secured at 22 cm at the lips.  Findings post-intubation: bilateral breath sounds  Position Confirmation: auscultation

## 2019-06-21 NOTE — PROGRESS NOTES
Dr. Morin and lung transplant team at patient's bedside. Expressed concerns with patient's pain management, anxiety, and inability to effectively cough. Updated with most recent vitals, assessment, labs, with concentration on breath sounds increasingly worsening this AM. Pain management team also aware of primary's plan of care at this time. All orders received and implemented. Will also obtain a VBG at this time. Will continue to monitor pt closely.

## 2019-06-21 NOTE — PROGRESS NOTES
[facility]  Infectious Disease  Progress Note    Patient Name: Juanita Ibarra  MRN: [id number]  Admission Date: 6/17/2019  Length of Stay: 3 days  Attending Physician: Francisca Morin DO  Primary Care Provider: Primary Doctor No    Isolation Status: No active isolations  Assessment/Plan:      * S/P lung transplant  29-year-old female with history of CF s/p bilateral sequential lung transplant 6/2014 c/b CARLOS EDUARDO and end stage lung disease, admitted 6/18/2019 for bilateral lung re-transplant (CMV D-/R+, basiliximab induction, on tacro/MMF/pred).  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  Patient with history of MRSA and Pseudomonas pneumonia - ID consulted for antibiotic recommendations.       Recommendations:  - Continue vancomycin, meropenem IV  - Follow-up donor lung cultures  - Prophylaxis per lung transplant protocol - on dapsone, fluconazole, ganciclovir        Anticipated Disposition: pending    Thank you for your consult. I will follow-up with patient. Please contact us if you have any additional questions.    Dannie Juan MD  Infectious Disease  [facility]    Subjective:     Principal Problem:S/P lung transplant    HPI: 29-year-old female with history of CF s/p bilateral sequential lung transplant 6/2014 c/b CARLOS EDUARDO and end stage lung disease, admitted 6/18/2019 for bilateral lung re-transplant (CMV D-/R+, basiliximab induction, on tacro/MMF/pred).  ID consulted for antibiotic recommendations.  Patient currently on vanc / beatrice based on prior culture results.  Interval History: extubated, afebrile    Review of Systems   Constitutional: Negative for activity change, chills and fever.   HENT: Negative for congestion, mouth sores, rhinorrhea, sinus pressure and sore throat.    Eyes: Negative for photophobia, pain and redness.   Respiratory: Negative for cough, chest tightness, shortness of breath  and wheezing.    Cardiovascular: Negative for chest pain and leg swelling.   Gastrointestinal: Negative for abdominal distention, abdominal pain, diarrhea, nausea and vomiting.   Endocrine: Negative for polyuria.   Genitourinary: Negative for decreased urine volume, dysuria and flank pain.   Musculoskeletal: Negative for joint swelling and neck pain.   Skin: Negative for color change.   Allergic/Immunologic: Negative for food allergies.   Neurological: Negative for dizziness, weakness and headaches.   Hematological: Negative for adenopathy.   Psychiatric/Behavioral: Negative for agitation and confusion. The patient is not nervous/anxious.      Objective:     Vital Signs (Most Recent):  Temp: 99.5 °F (37.5 °C) (06/21/19 0934)  Pulse: (!) 114 (06/21/19 0934)  Resp: 16 (06/21/19 0934)  BP: (!) 137/92 (06/21/19 0934)  SpO2: 100 % (06/21/19 0934) Vital Signs (24h Range):  Temp:  [97.9 °F (36.6 °C)-99.9 °F (37.7 °C)] 99.5 °F (37.5 °C)  Pulse:  [] 114  Resp:  [16-66] 16  SpO2:  [98 %-100 %] 100 %  BP: (127-170)/() 137/92  Arterial Line BP: (145-163)/() 161/100     Weight: 69.2 kg (152 lb 8.9 oz)  Body mass index is 28.83 kg/m².    Estimated Creatinine Clearance: 104.6 mL/min (based on SCr of 0.7 mg/dL).    Physical Exam   Constitutional: She is oriented to person, place, and time. She appears well-developed and well-nourished.   HENT:   Head: Normocephalic and atraumatic.   Eyes: Pupils are equal, round, and reactive to light.   Neck: Normal range of motion. Neck supple.   Cardiovascular: Normal rate.   Pulmonary/Chest: Effort normal and breath sounds normal.   Abdominal: Soft. Bowel sounds are normal.   Musculoskeletal: She exhibits no edema or tenderness.   Neurological: She is alert and oriented to person, place, and time.   Skin: Skin is warm and dry.   Psychiatric: She has a normal mood and affect.       Significant Labs:   CBC:   Recent Labs   Lab 06/20/19  0300 06/20/19  1535 06/21/19  0400   WBC  11.22 13.16* 13.60*   HGB 8.4* 8.9* 8.5*   HCT 25.0* 26.8* 26.6*   PLT 82* 97* 113*     CMP:   Recent Labs   Lab 06/20/19  0300 06/20/19  1535 06/21/19  0400    138 141   K 4.2 4.5 4.6    103 104   CO2 23 28 29   * 152* 123*   BUN 17 16 13   CREATININE 0.8 0.8 0.7   CALCIUM 7.8* 8.3* 8.4*   PROT 5.3* 6.0 6.1   ALBUMIN 2.8* 2.9* 2.9*   BILITOT 0.5 0.4 0.5   ALKPHOS 143* 160* 161*   AST 30 37 44*   ALT 25 29 31   ANIONGAP 10 7* 8   EGFRNONAA >60.0 >60.0 >60.0       Significant Imaging: I have reviewed all pertinent imaging results/findings within the past 24 hours.

## 2019-06-21 NOTE — PROGRESS NOTES
[facility]  Lung Transplant  Progress Note - Critical Care    Patient Name: Juanita Ibarra  MRN: [id number]  Admission Date: 6/17/2019  Hospital Length of Stay: 3 days  Post-Operative Day: 1835 (Lung), 3 (Lung)  Attending Physician: Francisca Morin DO  Primary Care Provider: Primary Doctor No     Subjective:     Interval History: POD #3 s/p BLT (re-transplant).  Agitated with hyperactive delirium throughout the night requiring frequent dosing of anxiolytics.  She is otherwise hemodynamically stable with marked sinus tachycardia and hypertension.  Complaining about dry mouth and abdominal pain.    Continuous Infusions:   dexmedetomidine (PRECEDEX) infusion 1.4 mcg/kg/hr (06/21/19 0800)    dextrose 5 % and 0.45 % NaCl with KCl 20 mEq 5 mL/hr at 06/18/19 1556    hydromorphone in 0.9 % NaCl 6 mg/30 ml      insulin (HUMAN R) infusion (adults) 0.4 Units/hr (06/21/19 0800)    ketamine (KETALAR) infusion 25 mcg/kg/min (06/21/19 0800)    nicardipine Stopped (06/20/19 1200)     Scheduled Meds:   acetaminophen  1,000 mg Intravenous Q8H    [START ON 6/22/2019] acetaminophen  1,000 mg Oral Q8H    basiliximab (SIMULECT) chemo infusion  20 mg Intravenous Q96H    chlorhexidine  10 mL Mouth/Throat BID    fluconazole (DIFLUCAN) IVPB  400 mg Intravenous Q24H    ganciclovir (CYTOVENE) IVPB  5 mg/kg Intravenous Q12H    levalbuterol  1.25 mg Nebulization Q8H WA    meropenem (MERREM) IVPB  1 g Intravenous Q8H    methylPREDNISolone sodium succinate  2 mg/kg Intravenous Daily    mupirocin  1 g Nasal BID    mycophenolate (CELLCEPT) IVPB  500 mg Intravenous BID    tacrolimus  0.5 mg Sublingual BID    vancomycin (VANCOCIN) IVPB  750 mg Intravenous Q12H     PRN Meds:bisacodyl, Dextrose 10% Bolus, Dextrose 10% Bolus, dextrose 50%, dextrose 50%, diphenhydrAMINE, glucagon (human recombinant), glucose, glucose, insulin aspart U-100, lactulose, levalbuterol, lorazepam, magnesium sulfate IVPB,  metoclopramide HCl, naloxone, ondansetron, ondansetron, oxyCODONE, oxyCODONE, potassium chloride in water **AND** potassium chloride in water **AND** potassium chloride in water, promethazine (PHENERGAN) IVPB, sodium phosphate IVPB, sodium phosphate IVPB, sodium phosphate IVPB    Review of patient's allergies indicates:   Allergen Reactions    Albuterol Palpitations    Colistin Anaphylaxis    Vancomycin analogues      Infusion reaction that does not resolve with slowing  Pt. States she can tolerate it when given with 50 mg Benadryl and ran over 3 hours    Neupogen [filgrastim] Other (See Comments)     Ostealgia after five daily doses of 300 mcg.      Bactrim [sulfamethoxazole-trimethoprim] Hives    Ceftazidime Hives     Pt stated can tolerate cefapine not ceftazidime    Ceftazidime     Dronabinol Other (See Comments)     Mental changes/hallucinations    Haldol [haloperidol lactate] Other (See Comments)     Seizure like activity    Nsaids (non-steroidal anti-inflammatory drug)      Cannot have due to lung transplant    Adhesive Rash     Cloth tape- please use tegaderm or paper tape    Aztreonam Rash    Ciprofloxacin Nausea And Vomiting     Projectile N/V, per patient.  Unwilling to retry therapy.       Review of Systems   Unable to perform ROS: Mental status change   Cardiovascular: Positive for chest pain.   Gastrointestinal: Positive for abdominal pain.   Psychiatric/Behavioral: Positive for agitation and confusion. The patient is nervous/anxious and is hyperactive.      Objective:   Physical Exam   Constitutional: She appears well-developed and well-nourished. She appears distressed. She is sedated and not intubated.   HENT:   Head: Normocephalic and atraumatic.   Nose: Nose normal.   On HFNC   Eyes: Conjunctivae and EOM are normal.   Neck: Neck supple. No JVD present.   Right IJ CVC with dressing c/d/i; right subclavian port with dressing c/d/i   Cardiovascular: Normal rate and regular rhythm. Exam  reveals friction rub.   Murmur heard.  Pulmonary/Chest: She is not intubated. No respiratory distress. She has decreased breath sounds in the right lower field and the left lower field. She has no wheezes. She has no rhonchi. She has no rales.   Bilateral pleural and mediastinal chest tubes with sanguinous output, bilateral airleak   Abdominal: Soft. Bowel sounds are normal. She exhibits distension. There is tenderness.   Protuberant abdomen   Genitourinary:   Genitourinary Comments: Goldberg to gravity   Musculoskeletal: She exhibits edema.   Neurological: She is alert. No cranial nerve deficit.   Skin: Skin is warm and dry. She is not diaphoretic. There is pallor.   Psychiatric: Her mood appears anxious. Her affect is labile. She is agitated and hyperactive.   Nursing note and vitals reviewed.        Vital Signs (Most Recent):  Temp: 99.9 °F (37.7 °C) (06/21/19 0800)  Pulse: (!) 124 (06/21/19 0800)  Resp: (!) 30 (06/21/19 0800)  BP: (!) 156/105 (06/21/19 0800)  SpO2: 100 % (06/21/19 0800) Vital Signs (24h Range):  Temp:  [97.9 °F (36.6 °C)-99.9 °F (37.7 °C)] 99.9 °F (37.7 °C)  Pulse:  [] 124  Resp:  [20-66] 30  SpO2:  [98 %-100 %] 100 %  BP: (127-170)/() 156/105  Arterial Line BP: (145-163)/() 161/100     Weight: 69.2 kg (152 lb 8.9 oz)  Body mass index is 28.83 kg/m².      Intake/Output Summary (Last 24 hours) at 6/21/2019 0901  Last data filed at 6/21/2019 0800  Gross per 24 hour   Intake 1488.7 ml   Output 3565 ml   Net -2076.3 ml       Ventilator Data:     Vent Mode: Spont  Oxygen Concentration (%):  [40-60] 60  Resp Rate Total:  [17 br/min-28 br/min] 19 br/min  PEEP/CPAP:  [5 cmH20] 5 cmH20  Pressure Support:  [5 cmH20] 5 cmH20  Mean Airway Pressure:  [6.3 cmH20-6.6 cmH20] 6.3 cmH20    Hemodynamic Parameters:       Lines/Drains:       Introducer right internal jugular (Active)   Specific Qualities Other (Comment) 6/21/2019  7:15 AM   Dressing Status Biopatch in place;Clean;Dry;Intact 6/21/2019   7:15 AM   Dressing Intervention Dressing reinforced 6/21/2019  7:15 AM   Dressing Change Due 06/25/19 6/21/2019  7:15 AM   Daily Line Review Performed 6/21/2019  7:15 AM   Number of days:             Port A Cath Single Lumen 06/24/14 1200 right subclavian (Active)   Accessed by: Radha Ortega RN 6/17/2019  8:00 PM   Dressing Type Transparent 6/21/2019  7:15 AM   Dressing Status Biopatch in place;Clean;Dry;Intact 6/21/2019  7:15 AM   Dressing Intervention Dressing reinforced 6/21/2019  7:15 AM   Dressing Change Due 06/24/19 6/21/2019  7:15 AM   Line Status Saline locked 6/21/2019  7:15 AM   Flush Performed Yes 6/17/2019  8:00 PM   Date to be Reflushed 06/18/19 6/17/2019  8:00 PM   Daily Line Review Performed 6/21/2019  7:15 AM   Type of Needle Nicole 6/21/2019  7:15 AM   Gauge 20 6/17/2019  8:00 PM   Needle Length 1 in 6/17/2019  8:00 PM   Needle Status Left in place 6/17/2019  8:00 PM   Needle Insertion Date 06/17/19 6/17/2019  8:00 PM   Needle Insertion Time 1900 6/17/2019  8:00 PM   Number of days: 1822            Percutaneous Central Line Insertion/Assessment - double lumen  06/18/19 0510 (Active)   Dressing biopatch in place;dressing dry and intact 6/21/2019  7:15 AM   Securement secured w/ sutures 6/21/2019  7:15 AM   Additional Site Signs no erythema;no warmth;no edema;no pain;no palpable cord;no streak formation;no drainage 6/21/2019  7:15 AM   Distal Patency/Care infusing 6/21/2019  7:15 AM   Proximal Patency/Care flushed w/o difficulty;normal saline locked 6/21/2019  7:15 AM   Waveform normal 6/21/2019  7:15 AM   Line Interventions line leveled/zeroed 6/21/2019  7:15 AM   Dressing Change Due 06/25/19 6/21/2019  7:15 AM   Daily Line Review Performed 6/21/2019  7:15 AM   Number of days: 3            Percutaneous Central Line Insertion/Assessment - triple lumen  right internal jugular (Active)   Dressing biopatch in place;dressing dry and intact 6/21/2019  7:15 AM   Securement secured w/ sutures 6/21/2019   "7:15 AM   Additional Site Signs no erythema;no warmth;no edema;no pain;no palpable cord;no streak formation;no drainage 6/21/2019  7:15 AM   Distal Patency/Care infusing 6/21/2019  7:15 AM   Medial Patency/Care infusing 6/21/2019  7:15 AM   Proximal Patency/Care infusing 6/21/2019  7:15 AM   Waveform normal 6/21/2019  7:15 AM   Line Interventions line leveled/zeroed 6/21/2019  7:15 AM   Dressing Change Due 06/25/19 6/21/2019  7:15 AM   Daily Line Review Performed 6/21/2019  7:15 AM   Number of days:             Peripheral IV - Single Lumen 06/18/19 18 G Hand (Active)   Site Assessment Clean;Dry;Intact;No redness;No swelling 6/21/2019  7:15 AM   Line Status Infusing 6/21/2019  7:15 AM   Dressing Status Clean;Dry;Intact 6/21/2019  7:15 AM   Dressing Intervention Dressing reinforced 6/21/2019  7:15 AM   Dressing Change Due 06/22/19 6/21/2019  7:15 AM   Site Change Due 06/22/19 6/21/2019  3:01 AM   Reason Not Rotated Not due 6/21/2019  7:15 AM   Number of days: 3            Urethral Catheter 06/18/19 0504 Non-latex;Straight-tip;Temperature probe 14 Fr. (Active)   Site Assessment Clean;Intact 6/21/2019  7:15 AM   Collection Container Urimeter 6/21/2019  7:15 AM   Securement Method secured to top of thigh w/ adhesive device 6/21/2019  7:15 AM   Catheter Care Performed yes 6/21/2019  7:15 AM   Reason for Continuing Urinary Catheterization Critically ill in ICU requiring intensive monitoring;Post operative 6/21/2019  7:15 AM   CAUTI Prevention Bundle StatLock in place w 1" slack;Intact seal between catheter & drainage tubing;Drainage bag off the floor;Green sheeting clip in use;No dependent loops or kinks;Drainage bag not overfilled (<2/3 full);Drainage bag below bladder 6/21/2019  7:15 AM   Output (mL) 75 mL 6/21/2019  8:00 AM   Number of days: 3            Y Chest Tube 1 and 2 06/18/19 1151 1 Right Mediastinal 19 Fr. 2 Right Pleural 19 Fr. (Active)   Function -20 cm H2O 6/21/2019  7:15 AM   Air Leak/Fluctuation air leak " not present;fluctuation not present;connections tightened;dependent drainage cleared 6/21/2019  7:15 AM   Safety all tubing connections taped;2 rubber-tipped hemostats w/ patient;all connections secured;suction checked 6/21/2019  7:15 AM   Securement tubing secured to body distal to insertion site w/ tape 6/21/2019  7:15 AM   Left Subcutaneous Emphysema none present 6/21/2019  7:15 AM   Right Subcutaneous Emphysema none present 6/21/2019  7:15 AM   Patency Intervention Milked;Stripped;Tip/tilt 6/21/2019  7:15 AM   Drainage Description 1 Serosanguineous 6/21/2019  7:15 AM   Tube 1 Dressing Appearance occlusive gauze dressing intact;clean and dry 6/21/2019  7:15 AM   Tube 1 Dressing Care dressing changed 6/21/2019  7:15 AM   Site Assessment 1 Not assessed;Other (Comment) 6/21/2019  7:15 AM   Surrounding Skin 1 Unable to view;Other (Comment) 6/21/2019  7:15 AM   Drainage Description 2 Serosanguineous 6/21/2019  7:15 AM   Tube 2 Dressing Appearance occlusive gauze dressing intact;clean and dry 6/21/2019  7:15 AM   Tube 2 Dressing Care dressing changed 6/21/2019  7:15 AM   Site Assessment 2 Not assessed;Other (Comment) 6/21/2019  7:15 AM   Surrounding Skin Unable to view;Other (Comment) 6/21/2019  7:15 AM   Output (mL) 50 mL 6/21/2019  8:00 AM   Number of days: 2            Y Chest Tube 3 and 4 06/18/19 1151 3 Left Pleural 19 Fr. 4 Left Mediastinal 19 Fr. (Active)   Function -20 cm H2O 6/21/2019  7:15 AM   Air Leak/Fluctuation air leak not present;fluctuation not present;connections tightened;dependent drainage cleared 6/21/2019  7:15 AM   Safety all tubing connections taped;2 rubber-tipped hemostats w/ patient;all connections secured;suction checked 6/21/2019  7:15 AM   Securement tubing secured to body distal to insertion site w/ tape 6/21/2019  7:15 AM   Left Subcutaneous Emphysema none present 6/21/2019  7:15 AM   Right Subcutaneous Emphysema none present 6/21/2019  7:15 AM   Patency Intervention  Milked;Stripped;Tip/tilt 6/21/2019  7:15 AM   Drainage Description 3 Serosanguineous 6/21/2019  7:15 AM   Tube 3 Dressing Appearance occlusive gauze dressing intact;clean and dry 6/21/2019  7:15 AM   Tube 3 Dressing Care dressing changed 6/21/2019  7:15 AM   Site Assessment 3 Not assessed;Other (Comment) 6/21/2019  7:15 AM   Surrounding Skin 3 Unable to view;Other (Comment) 6/21/2019  7:15 AM   Drainage Description 4 Serosanguineous 6/21/2019  7:15 AM   Tube 4 Dressing Appearance occlusive gauze dressing intact;clean and dry 6/21/2019  7:15 AM   Tube 4 Dressing Care dressing changed 6/21/2019  7:15 AM   Site Assessment 4 Not assessed;Other (Comment) 6/21/2019  7:15 AM   Surrounding Skin 4 Unable to view;Other (Comment) 6/21/2019  7:15 AM   Output (mL) 0 mL 6/21/2019  8:00 AM   Number of days: 2       Significant Labs:  CBC:  Recent Labs   Lab 06/21/19  0400   WBC 13.60*   RBC 3.10*   HGB 8.5*   HCT 26.6*   *   MCV 86   MCH 27.4   MCHC 32.0     BMP:  Recent Labs   Lab 06/21/19  0400      K 4.6      CO2 29   BUN 13   CREATININE 0.7   CALCIUM 8.4*      Tacrolimus Levels:  Recent Labs   Lab 06/21/19  0400   TACROLIMUS 2.7*     Microbiology:  Microbiology Results (last 7 days)     Procedure Component Value Units Date/Time    Aerobic culture [[id number]] Collected:  06/18/19 0930    Order Status:  Completed Specimen:  Tissue from Lung, Left Updated:  06/20/19 1323     Aerobic Bacterial Culture --     CANDIDA ALBICANS  Few  No other significant isolate      Narrative:       Donor lung    Culture, Respiratory with Gram Stain [[id number]] Collected:  06/20/19 0808    Order Status:  Completed Specimen:  Respiratory from Bronchial Wash Updated:  06/20/19 1249     Gram Stain (Respiratory) <10 epithelial cells per low power field.     Gram Stain (Respiratory) No WBC's     Gram Stain (Respiratory) No organisms seen    Culture, Anaerobe [[id number]] Collected:  06/18/19 0930    Order Status:  Completed Specimen:   Tissue from Lung, Left Updated:  06/20/19 1040     Anaerobic Culture Culture in progress    Narrative:       Donor lung    AFB Culture & Smear [[id number]] Collected:  06/20/19 0808    Order Status:  Sent Specimen:  Respiratory from Bronchial Wash Updated:  06/20/19 0936    Fungus culture [[id number]] Collected:  06/20/19 0808    Order Status:  Sent Specimen:  Respiratory from Bronchial Wash Updated:  06/20/19 0936    AFB Culture & Smear [[id number]] Collected:  06/18/19 0930    Order Status:  Completed Specimen:  Tissue from Lung, Left Updated:  06/19/19 2127     AFB Culture & Smear Culture in progress     AFB CULTURE STAIN No acid fast bacilli seen.    Narrative:       Donor lung    Urine culture [[id number]] Collected:  06/17/19 1753    Order Status:  Completed Specimen:  Urine, Clean Catch Updated:  06/19/19 0117     Urine Culture, Routine No significant growth    Narrative:       Indicated criteria for high risk culture:->Other  Other (specify):->awaiting lung transplant    Gram stain [[id number]] Collected:  06/18/19 0930    Order Status:  Completed Specimen:  Tissue from Lung, Left Updated:  06/18/19 1041     Gram Stain Result Moderate WBC's      No organisms seen    Narrative:       Donor lung    Fungus culture [[id number]] Collected:  06/18/19 0930    Order Status:  Sent Specimen:  Tissue from Lung, Left Updated:  06/18/19 0944          I have reviewed all pertinent labs within the past 24 hours.    Diagnostic Results:  Chest X-Ray: I personally reviewed the films and findings are:     worsening bilateral infiltrates        Assessment/Plan:     * S/P lung transplant  POD#3 s/p bilateral lung transplant (re-transplant) for CARLOS EDUARDO. 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 Remains PGD 0. S/p bronch and extubated to HFNC on POD#2. LUT primary and CTS to manage chest tubes.   - Continue immunosuppression and ppx per protocol. Continue Daily CXR.     Acute hyperactive delirium due to another medical condition  - Likely  multifactorial including post-op anesthesia, dissociative narcotics, ICU delirium  - discussed with Anes changing pain regimen.  Consider use of atypical antipsychotics  - minimize benzo use, although chronic medication for patient    Thrombocytopenia, unspecified  - Expected post-operatively. Continue to monitor on serial CBC.    Steroid-induced hyperglycemia  - Endocrine consulted.  Currently on Insulin gtt.  Goal BG <160    Prophylactic antibiotic  - CMV D-/R+. Continue OIP with ganciclovir, fluconazole, and dapsone.   - Continue Merrem/Vanc for routine surgical ppx based on previous sensitivities.   - Cx from BAL from 6/20 NGTD. ID following appreciate recs.     Immunosuppression  Previously on tacrolimus, MMF, and daily prednisone. Received solumedrol in OR and basiliximab upon arrival to ICU. Repeat basiliximab on POD#4.   - Continue MMF, tacrolimus, and steroid taper. Daily tacrolimus levels and adjust dose as needed.     Chronic pain with opiate use  - Anesthesia consulted for pain management. Continue aggressive bowel regimen.   - Currently on ketamine gtt, Precedex gtt (max), Ativan/Versed IV PRN.     - Q12 Bupivacaine in TARA catheters.  Plans to start PCA with Dilaudid per Anes  - Now with hyperactive delirium and hypercapnia with Benzo dosing. Discussed with Anes changing regimen    CF related Pancreatic insufficiency  - Resume pancreatic enzymes once cleared for PO      Preventive Measures: DVT: continue prophyllaxis, Head of Bet: elevate, Reposition: per unit routine, Physical Therapy: as tolerated when appropriate    Counseling/Consultation:I discussed the case with Dr. Morin.    Jhoan Antunez MD  Lung Transplant  [facility]

## 2019-06-21 NOTE — ASSESSMENT & PLAN NOTE
Previously on tacrolimus, MMF, and daily prednisone. Received solumedrol in OR and basiliximab upon arrival to ICU. Repeat basiliximab on POD#4.   - Continue MMF, tacrolimus, and steroid taper. Daily tacrolimus levels and adjust dose as needed.

## 2019-06-21 NOTE — SUBJECTIVE & OBJECTIVE
"Interval HPI:   Overnight events: Remains in ICU. NAEON. HFNC. BG reasonably well controlled on insulin infusion; rate decreased to 0.4 u/hr per orders plus correction scale. Solumedrol 118 mg; steroid taper per primary.   Eating:   NPO  Awaiting swallow study   Nausea: No  Hypoglycemia and intervention: No  Fever: 99.9  TPN and/or TF: No    BP (!) 145/99 (BP Location: Right arm, Patient Position: Lying)   Pulse (!) 116   Temp 99.9 °F (37.7 °C)   Resp (!) 44   Ht 5' 1" (1.549 m)   Wt 69.2 kg (152 lb 8.9 oz)   LMP 10/02/2016   SpO2 100%   Breastfeeding? No   BMI 28.83 kg/m²     Labs Reviewed and Include    Recent Labs   Lab 06/21/19  0400   *   CALCIUM 8.4*   ALBUMIN 2.9*   PROT 6.1      K 4.6   CO2 29      BUN 13   CREATININE 0.7   ALKPHOS 161*   ALT 31   AST 44*   BILITOT 0.5     Lab Results   Component Value Date    WBC 13.60 (H) 06/21/2019    HGB 8.5 (L) 06/21/2019    HCT 26.6 (L) 06/21/2019    MCV 86 06/21/2019     (L) 06/21/2019     No results for input(s): TSH, FREET4 in the last 168 hours.  Lab Results   Component Value Date    HGBA1C 4.7 01/09/2019       Nutritional status:   Body mass index is 28.83 kg/m².  Lab Results   Component Value Date    ALBUMIN 2.9 (L) 06/21/2019    ALBUMIN 2.9 (L) 06/20/2019    ALBUMIN 2.8 (L) 06/20/2019     Lab Results   Component Value Date    PREALBUMIN 15 (L) 05/29/2014    PREALBUMIN 11 (L) 05/09/2014    PREALBUMIN 18 (L) 03/19/2014       Estimated Creatinine Clearance: 104.6 mL/min (based on SCr of 0.7 mg/dL).    Accu-Checks  Recent Labs     06/20/19  0854 06/20/19  1003 06/20/19  1102 06/20/19  1204 06/20/19  1318 06/20/19  1400 06/20/19  1534 06/20/19  1944 06/21/19  0008 06/21/19  0354   POCTGLUCOSE 228* 243* 249* 189* 123* 139* 145* 123* 208* 148*       Current Medications and/or Treatments Impacting Glycemic Control  Immunotherapy:    Immunosuppressants         Stop Route Frequency     tacrolimus capsule 0.5 mg      -- SL 2 times daily "     mycophenolate (CELLCEPT) 500 mg in dextrose 5 % 100 mL IVPB      -- IV 2 times daily     basiliximab (SIMULECT) 20 mg in dextrose 5 % 50 mL chemo infusion      06/26 1514 IV Every 96 hours        Steroids:   Hormones (From admission, onward)    Start     Stop Route Frequency Ordered    06/21/19 0900  methylPREDNISolone sodium succinate injection 118 mg  (methylprednisolone panel)      06/22 0859 IV Daily 06/18/19 1411        Pressors:    Autonomic Drugs (From admission, onward)    None        Hyperglycemia/Diabetes Medications:   Antihyperglycemics (From admission, onward)    Start     Stop Route Frequency Ordered    06/20/19 1545  insulin regular (Humulin R) 100 Units in sodium chloride 0.9% 100 mL infusion      -- IV Continuous 06/20/19 1442    06/20/19 1542  insulin aspart U-100 pen 0-4 Units      -- SubQ As needed (PRN) 06/20/19 1442

## 2019-06-21 NOTE — PROGRESS NOTES
SW met with pt spouse while pt was sleeping.  Per medical record pt have been having Hallucinations and complaining of 10/10 pain.  Team states pt has been unable to be re-directed and is not oriented to person.  Anesthesia to f/u re: pain management plan.  Team to also re-start pt's antipsychotic.  Pt spouse states was doing okay and presented as calm and collected.  Spouse state will attempt to sleep while pt is not awake.  Support offered through discussion.  No needs voiced at this time.  SW will continue to provide psychosocial support, education, resources and assistance with needs as appropriate

## 2019-06-21 NOTE — PROGRESS NOTES
Pt has continued to decompensate throughout shift, unable to properly clear secretions and showing increased WOB. Pt reintubated with 8.0 ETT at 22cm at R lip. Placed on conventional vent on documented settings. Tolerating well, will continue to monitor.

## 2019-06-21 NOTE — ASSESSMENT & PLAN NOTE
BG goal 140 - 180.       increase insulin infusion at 0.6 u/hr.   BG monitoring every 4 hours and low dose correction scale.

## 2019-06-21 NOTE — ASSESSMENT & PLAN NOTE
kati Ibarra is a 29 y.o. female with CF s/p BL Lung Transplant June 2014 complicated by bronchiolitis obliterans and left vocal chord paralysis s/p L arytenopexy/laryngoplasty(implant) and cricoid subluxation. In addition has HTN, Anxiety, chronic opioid use. Does not have DM. She is s/p bilateral lung transplant 6/18/2019. A clamshell thoracotomy was used. Transported to SICU for recovery.     Neuro:   - Hx/o chronic opioid use in post-op patient  - Sedation: Propofol, ketamine  - Scheduled tylenol; PRN oxy, dilaudid   - PRN versed  - Dilaudid PCA    Pulmonary:   - Extubated 6/20  - Saturating well on 10 L HFNC  - Wean supplemental O2 as tolerated  - ABGs PRN     Cardiac:  - Drips: Cardene for MAP < 80  - Currently off cardene  - Keep MAPs >65  - HTN   -Hold home anti-hypertensives in acute setting   - Monitor chest tube output and character    Renal:   - Trend BUN/Cr  - BUN/Cr 13/0.7, stable  - Monitor UOP  - UOP good     ID:   - Afebrile, WBC 13.6 from 11.2 overnight  - Abx:Meropenem, IV Vanc, fluconazole, ganciclovir  - Give benadryl prior to Vanc administration to prevent red man syndrome  - Will continue to monitor for signs of infection   - ID consulted, f/u recs    Immuno:  - Cellcept, tacro, steroid taper  - Immunomodulatory drugs per CTS  - Basiliximab q4 days x2 doses     Hem:  - Trend Hgb post-op  - No signs of active bleeding     Endocrine:  - Insulin gtt   - Monitor BG     FEN/GI:   - NPO   - Replace lytes PRN    Dispo:  - Continue ICU Care  - Wean supplemental O2    Primary:   CTS

## 2019-06-21 NOTE — PLAN OF CARE
Patient not seen today - chart reviewed    29-year-old female with history of CF s/p bilateral sequential lung transplant 6/2014 c/b CARLOS EDUARDO and end stage lung disease, admitted 6/18/2019 for bilateral lung re-transplant (CMV D-/R+, basiliximab induction, on tacro/MMF/pred).  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  Patient with history of MRSA and Pseudomonas pneumonia - ID consulted for antibiotic recommendations.  Patient underwent bronchoscopy 6/20, cultures pending       Recommendations:  - Continue vancomycin, meropenem IV  - Follow-up donor lung cultures, bronch cultures 6/20  - Prophylaxis per lung transplant protocol - on dapsone, fluconazole, ganciclovir

## 2019-06-21 NOTE — PROGRESS NOTES
Notified FELICIA Ramsey with lung transplant of continued tachypnea and labored breathing. Also expressed concerns with pt's difficulty clearing airway and coughing up secretions. HR , SpO2 95% on 8L NC. Orders for STAT CXR and VBG.

## 2019-06-21 NOTE — SUBJECTIVE & OBJECTIVE
Subjective:     Interval History: POD #3 s/p BLT (re-transplant).  Agitated with hyperactive delirium throughout the night requiring frequent dosing of anxiolytics.  She is otherwise hemodynamically stable with marked sinus tachycardia and hypertension.  Complaining about dry mouth and abdominal pain.    Continuous Infusions:   dexmedetomidine (PRECEDEX) infusion 1.4 mcg/kg/hr (06/21/19 0800)    dextrose 5 % and 0.45 % NaCl with KCl 20 mEq 5 mL/hr at 06/18/19 1556    hydromorphone in 0.9 % NaCl 6 mg/30 ml      insulin (HUMAN R) infusion (adults) 0.4 Units/hr (06/21/19 0800)    ketamine (KETALAR) infusion 25 mcg/kg/min (06/21/19 0800)    nicardipine Stopped (06/20/19 1200)     Scheduled Meds:   acetaminophen  1,000 mg Intravenous Q8H    [START ON 6/22/2019] acetaminophen  1,000 mg Oral Q8H    basiliximab (SIMULECT) chemo infusion  20 mg Intravenous Q96H    chlorhexidine  10 mL Mouth/Throat BID    fluconazole (DIFLUCAN) IVPB  400 mg Intravenous Q24H    ganciclovir (CYTOVENE) IVPB  5 mg/kg Intravenous Q12H    levalbuterol  1.25 mg Nebulization Q8H WA    meropenem (MERREM) IVPB  1 g Intravenous Q8H    methylPREDNISolone sodium succinate  2 mg/kg Intravenous Daily    mupirocin  1 g Nasal BID    mycophenolate (CELLCEPT) IVPB  500 mg Intravenous BID    tacrolimus  0.5 mg Sublingual BID    vancomycin (VANCOCIN) IVPB  750 mg Intravenous Q12H     PRN Meds:bisacodyl, Dextrose 10% Bolus, Dextrose 10% Bolus, dextrose 50%, dextrose 50%, diphenhydrAMINE, glucagon (human recombinant), glucose, glucose, insulin aspart U-100, lactulose, levalbuterol, lorazepam, magnesium sulfate IVPB, metoclopramide HCl, naloxone, ondansetron, ondansetron, oxyCODONE, oxyCODONE, potassium chloride in water **AND** potassium chloride in water **AND** potassium chloride in water, promethazine (PHENERGAN) IVPB, sodium phosphate IVPB, sodium phosphate IVPB, sodium phosphate IVPB    Review of patient's allergies indicates:   Allergen  Reactions    Albuterol Palpitations    Colistin Anaphylaxis    Vancomycin analogues      Infusion reaction that does not resolve with slowing  Pt. States she can tolerate it when given with 50 mg Benadryl and ran over 3 hours    Neupogen [filgrastim] Other (See Comments)     Ostealgia after five daily doses of 300 mcg.      Bactrim [sulfamethoxazole-trimethoprim] Hives    Ceftazidime Hives     Pt stated can tolerate cefapine not ceftazidime    Ceftazidime     Dronabinol Other (See Comments)     Mental changes/hallucinations    Haldol [haloperidol lactate] Other (See Comments)     Seizure like activity    Nsaids (non-steroidal anti-inflammatory drug)      Cannot have due to lung transplant    Adhesive Rash     Cloth tape- please use tegaderm or paper tape    Aztreonam Rash    Ciprofloxacin Nausea And Vomiting     Projectile N/V, per patient.  Unwilling to retry therapy.       Review of Systems   Unable to perform ROS: Mental status change   Cardiovascular: Positive for chest pain.   Gastrointestinal: Positive for abdominal pain.   Psychiatric/Behavioral: Positive for agitation and confusion. The patient is nervous/anxious and is hyperactive.      Objective:   Physical Exam   Constitutional: She appears well-developed and well-nourished. She appears distressed. She is sedated and not intubated.   HENT:   Head: Normocephalic and atraumatic.   Nose: Nose normal.   On HFNC   Eyes: Conjunctivae and EOM are normal.   Neck: Neck supple. No JVD present.   Right IJ CVC with dressing c/d/i; right subclavian port with dressing c/d/i   Cardiovascular: Normal rate and regular rhythm. Exam reveals friction rub.   Murmur heard.  Pulmonary/Chest: She is not intubated. No respiratory distress. She has decreased breath sounds in the right lower field and the left lower field. She has no wheezes. She has no rhonchi. She has no rales.   Bilateral pleural and mediastinal chest tubes with sanguinous output, bilateral airleak    Abdominal: Soft. Bowel sounds are normal. She exhibits distension. There is tenderness.   Protuberant abdomen   Genitourinary:   Genitourinary Comments: Goldberg to gravity   Musculoskeletal: She exhibits edema.   Neurological: She is alert. No cranial nerve deficit.   Skin: Skin is warm and dry. She is not diaphoretic. There is pallor.   Psychiatric: Her mood appears anxious. Her affect is labile. She is agitated and hyperactive.   Nursing note and vitals reviewed.        Vital Signs (Most Recent):  Temp: 99.9 °F (37.7 °C) (06/21/19 0800)  Pulse: (!) 124 (06/21/19 0800)  Resp: (!) 30 (06/21/19 0800)  BP: (!) 156/105 (06/21/19 0800)  SpO2: 100 % (06/21/19 0800) Vital Signs (24h Range):  Temp:  [97.9 °F (36.6 °C)-99.9 °F (37.7 °C)] 99.9 °F (37.7 °C)  Pulse:  [] 124  Resp:  [20-66] 30  SpO2:  [98 %-100 %] 100 %  BP: (127-170)/() 156/105  Arterial Line BP: (145-163)/() 161/100     Weight: 69.2 kg (152 lb 8.9 oz)  Body mass index is 28.83 kg/m².      Intake/Output Summary (Last 24 hours) at 6/21/2019 0901  Last data filed at 6/21/2019 0800  Gross per 24 hour   Intake 1488.7 ml   Output 3565 ml   Net -2076.3 ml       Ventilator Data:     Vent Mode: Spont  Oxygen Concentration (%):  [40-60] 60  Resp Rate Total:  [17 br/min-28 br/min] 19 br/min  PEEP/CPAP:  [5 cmH20] 5 cmH20  Pressure Support:  [5 cmH20] 5 cmH20  Mean Airway Pressure:  [6.3 cmH20-6.6 cmH20] 6.3 cmH20    Hemodynamic Parameters:       Lines/Drains:       Introducer right internal jugular (Active)   Specific Qualities Other (Comment) 6/21/2019  7:15 AM   Dressing Status Biopatch in place;Clean;Dry;Intact 6/21/2019  7:15 AM   Dressing Intervention Dressing reinforced 6/21/2019  7:15 AM   Dressing Change Due 06/25/19 6/21/2019  7:15 AM   Daily Line Review Performed 6/21/2019  7:15 AM   Number of days:             Port A Cath Single Lumen 06/24/14 1200 right subclavian (Active)   Accessed by: Radha Ortega RN 6/17/2019  8:00 PM   Dressing  Type Transparent 6/21/2019  7:15 AM   Dressing Status Biopatch in place;Clean;Dry;Intact 6/21/2019  7:15 AM   Dressing Intervention Dressing reinforced 6/21/2019  7:15 AM   Dressing Change Due 06/24/19 6/21/2019  7:15 AM   Line Status Saline locked 6/21/2019  7:15 AM   Flush Performed Yes 6/17/2019  8:00 PM   Date to be Reflushed 06/18/19 6/17/2019  8:00 PM   Daily Line Review Performed 6/21/2019  7:15 AM   Type of Needle Nicole 6/21/2019  7:15 AM   Gauge 20 6/17/2019  8:00 PM   Needle Length 1 in 6/17/2019  8:00 PM   Needle Status Left in place 6/17/2019  8:00 PM   Needle Insertion Date 06/17/19 6/17/2019  8:00 PM   Needle Insertion Time 1900 6/17/2019  8:00 PM   Number of days: 1822            Percutaneous Central Line Insertion/Assessment - double lumen  06/18/19 0510 (Active)   Dressing biopatch in place;dressing dry and intact 6/21/2019  7:15 AM   Securement secured w/ sutures 6/21/2019  7:15 AM   Additional Site Signs no erythema;no warmth;no edema;no pain;no palpable cord;no streak formation;no drainage 6/21/2019  7:15 AM   Distal Patency/Care infusing 6/21/2019  7:15 AM   Proximal Patency/Care flushed w/o difficulty;normal saline locked 6/21/2019  7:15 AM   Waveform normal 6/21/2019  7:15 AM   Line Interventions line leveled/zeroed 6/21/2019  7:15 AM   Dressing Change Due 06/25/19 6/21/2019  7:15 AM   Daily Line Review Performed 6/21/2019  7:15 AM   Number of days: 3            Percutaneous Central Line Insertion/Assessment - triple lumen  right internal jugular (Active)   Dressing biopatch in place;dressing dry and intact 6/21/2019  7:15 AM   Securement secured w/ sutures 6/21/2019  7:15 AM   Additional Site Signs no erythema;no warmth;no edema;no pain;no palpable cord;no streak formation;no drainage 6/21/2019  7:15 AM   Distal Patency/Care infusing 6/21/2019  7:15 AM   Medial Patency/Care infusing 6/21/2019  7:15 AM   Proximal Patency/Care infusing 6/21/2019  7:15 AM   Waveform normal 6/21/2019  7:15 AM  "  Line Interventions line leveled/zeroed 6/21/2019  7:15 AM   Dressing Change Due 06/25/19 6/21/2019  7:15 AM   Daily Line Review Performed 6/21/2019  7:15 AM   Number of days:             Peripheral IV - Single Lumen 06/18/19 18 G Hand (Active)   Site Assessment Clean;Dry;Intact;No redness;No swelling 6/21/2019  7:15 AM   Line Status Infusing 6/21/2019  7:15 AM   Dressing Status Clean;Dry;Intact 6/21/2019  7:15 AM   Dressing Intervention Dressing reinforced 6/21/2019  7:15 AM   Dressing Change Due 06/22/19 6/21/2019  7:15 AM   Site Change Due 06/22/19 6/21/2019  3:01 AM   Reason Not Rotated Not due 6/21/2019  7:15 AM   Number of days: 3            Urethral Catheter 06/18/19 0504 Non-latex;Straight-tip;Temperature probe 14 Fr. (Active)   Site Assessment Clean;Intact 6/21/2019  7:15 AM   Collection Container Urimeter 6/21/2019  7:15 AM   Securement Method secured to top of thigh w/ adhesive device 6/21/2019  7:15 AM   Catheter Care Performed yes 6/21/2019  7:15 AM   Reason for Continuing Urinary Catheterization Critically ill in ICU requiring intensive monitoring;Post operative 6/21/2019  7:15 AM   CAUTI Prevention Bundle StatLock in place w 1" slack;Intact seal between catheter & drainage tubing;Drainage bag off the floor;Green sheeting clip in use;No dependent loops or kinks;Drainage bag not overfilled (<2/3 full);Drainage bag below bladder 6/21/2019  7:15 AM   Output (mL) 75 mL 6/21/2019  8:00 AM   Number of days: 3            Y Chest Tube 1 and 2 06/18/19 1151 1 Right Mediastinal 19 Fr. 2 Right Pleural 19 Fr. (Active)   Function -20 cm H2O 6/21/2019  7:15 AM   Air Leak/Fluctuation air leak not present;fluctuation not present;connections tightened;dependent drainage cleared 6/21/2019  7:15 AM   Safety all tubing connections taped;2 rubber-tipped hemostats w/ patient;all connections secured;suction checked 6/21/2019  7:15 AM   Securement tubing secured to body distal to insertion site w/ tape 6/21/2019  7:15 AM "   Left Subcutaneous Emphysema none present 6/21/2019  7:15 AM   Right Subcutaneous Emphysema none present 6/21/2019  7:15 AM   Patency Intervention Milked;Stripped;Tip/tilt 6/21/2019  7:15 AM   Drainage Description 1 Serosanguineous 6/21/2019  7:15 AM   Tube 1 Dressing Appearance occlusive gauze dressing intact;clean and dry 6/21/2019  7:15 AM   Tube 1 Dressing Care dressing changed 6/21/2019  7:15 AM   Site Assessment 1 Not assessed;Other (Comment) 6/21/2019  7:15 AM   Surrounding Skin 1 Unable to view;Other (Comment) 6/21/2019  7:15 AM   Drainage Description 2 Serosanguineous 6/21/2019  7:15 AM   Tube 2 Dressing Appearance occlusive gauze dressing intact;clean and dry 6/21/2019  7:15 AM   Tube 2 Dressing Care dressing changed 6/21/2019  7:15 AM   Site Assessment 2 Not assessed;Other (Comment) 6/21/2019  7:15 AM   Surrounding Skin Unable to view;Other (Comment) 6/21/2019  7:15 AM   Output (mL) 50 mL 6/21/2019  8:00 AM   Number of days: 2            Y Chest Tube 3 and 4 06/18/19 1151 3 Left Pleural 19 Fr. 4 Left Mediastinal 19 Fr. (Active)   Function -20 cm H2O 6/21/2019  7:15 AM   Air Leak/Fluctuation air leak not present;fluctuation not present;connections tightened;dependent drainage cleared 6/21/2019  7:15 AM   Safety all tubing connections taped;2 rubber-tipped hemostats w/ patient;all connections secured;suction checked 6/21/2019  7:15 AM   Securement tubing secured to body distal to insertion site w/ tape 6/21/2019  7:15 AM   Left Subcutaneous Emphysema none present 6/21/2019  7:15 AM   Right Subcutaneous Emphysema none present 6/21/2019  7:15 AM   Patency Intervention Milked;Stripped;Tip/tilt 6/21/2019  7:15 AM   Drainage Description 3 Serosanguineous 6/21/2019  7:15 AM   Tube 3 Dressing Appearance occlusive gauze dressing intact;clean and dry 6/21/2019  7:15 AM   Tube 3 Dressing Care dressing changed 6/21/2019  7:15 AM   Site Assessment 3 Not assessed;Other (Comment) 6/21/2019  7:15 AM   Surrounding Skin 3  Unable to view;Other (Comment) 6/21/2019  7:15 AM   Drainage Description 4 Serosanguineous 6/21/2019  7:15 AM   Tube 4 Dressing Appearance occlusive gauze dressing intact;clean and dry 6/21/2019  7:15 AM   Tube 4 Dressing Care dressing changed 6/21/2019  7:15 AM   Site Assessment 4 Not assessed;Other (Comment) 6/21/2019  7:15 AM   Surrounding Skin 4 Unable to view;Other (Comment) 6/21/2019  7:15 AM   Output (mL) 0 mL 6/21/2019  8:00 AM   Number of days: 2       Significant Labs:  CBC:  Recent Labs   Lab 06/21/19  0400   WBC 13.60*   RBC 3.10*   HGB 8.5*   HCT 26.6*   *   MCV 86   MCH 27.4   MCHC 32.0     BMP:  Recent Labs   Lab 06/21/19  0400      K 4.6      CO2 29   BUN 13   CREATININE 0.7   CALCIUM 8.4*      Tacrolimus Levels:  Recent Labs   Lab 06/21/19  0400   TACROLIMUS 2.7*     Microbiology:  Microbiology Results (last 7 days)     Procedure Component Value Units Date/Time    Aerobic culture [[id number]] Collected:  06/18/19 0930    Order Status:  Completed Specimen:  Tissue from Lung, Left Updated:  06/20/19 1323     Aerobic Bacterial Culture --     CANDIDA ALBICANS  Few  No other significant isolate      Narrative:       Donor lung    Culture, Respiratory with Gram Stain [[id number]] Collected:  06/20/19 0808    Order Status:  Completed Specimen:  Respiratory from Bronchial Wash Updated:  06/20/19 1249     Gram Stain (Respiratory) <10 epithelial cells per low power field.     Gram Stain (Respiratory) No WBC's     Gram Stain (Respiratory) No organisms seen    Culture, Anaerobe [[id number]] Collected:  06/18/19 0930    Order Status:  Completed Specimen:  Tissue from Lung, Left Updated:  06/20/19 1040     Anaerobic Culture Culture in progress    Narrative:       Donor lung    AFB Culture & Smear [[id number]] Collected:  06/20/19 0808    Order Status:  Sent Specimen:  Respiratory from Bronchial Wash Updated:  06/20/19 0936    Fungus culture [[id number]] Collected:  06/20/19 0808    Order Status:   Sent Specimen:  Respiratory from Bronchial Wash Updated:  06/20/19 0936    AFB Culture & Smear [[id number]] Collected:  06/18/19 0930    Order Status:  Completed Specimen:  Tissue from Lung, Left Updated:  06/19/19 2127     AFB Culture & Smear Culture in progress     AFB CULTURE STAIN No acid fast bacilli seen.    Narrative:       Donor lung    Urine culture [[id number]] Collected:  06/17/19 1753    Order Status:  Completed Specimen:  Urine, Clean Catch Updated:  06/19/19 0117     Urine Culture, Routine No significant growth    Narrative:       Indicated criteria for high risk culture:->Other  Other (specify):->awaiting lung transplant    Gram stain [[id number]] Collected:  06/18/19 0930    Order Status:  Completed Specimen:  Tissue from Lung, Left Updated:  06/18/19 1041     Gram Stain Result Moderate WBC's      No organisms seen    Narrative:       Donor lung    Fungus culture [[id number]] Collected:  06/18/19 0930    Order Status:  Sent Specimen:  Tissue from Lung, Left Updated:  06/18/19 0944          I have reviewed all pertinent labs within the past 24 hours.    Diagnostic Results:  Chest X-Ray: I personally reviewed the films and findings are:     worsening bilateral infiltrates

## 2019-06-21 NOTE — ASSESSMENT & PLAN NOTE
- Anesthesia consulted for pain management. Continue aggressive bowel regimen.   - Currently on ketamine gtt, Precedex gtt (max), Ativan/Versed IV PRN.     - Q12 Bupivacaine in TARA catheters.  Plans to start PCA with Dilaudid per Anes  - Now with hyperactive delirium and hypercapnia with Benzo dosing. Discussed with Anes changing regimen

## 2019-06-21 NOTE — SUBJECTIVE & OBJECTIVE
Interval History: extubated, afebrile    Review of Systems   Constitutional: Negative for activity change, chills and fever.   HENT: Negative for congestion, mouth sores, rhinorrhea, sinus pressure and sore throat.    Eyes: Negative for photophobia, pain and redness.   Respiratory: Negative for cough, chest tightness, shortness of breath and wheezing.    Cardiovascular: Negative for chest pain and leg swelling.   Gastrointestinal: Negative for abdominal distention, abdominal pain, diarrhea, nausea and vomiting.   Endocrine: Negative for polyuria.   Genitourinary: Negative for decreased urine volume, dysuria and flank pain.   Musculoskeletal: Negative for joint swelling and neck pain.   Skin: Negative for color change.   Allergic/Immunologic: Negative for food allergies.   Neurological: Negative for dizziness, weakness and headaches.   Hematological: Negative for adenopathy.   Psychiatric/Behavioral: Negative for agitation and confusion. The patient is not nervous/anxious.      Objective:     Vital Signs (Most Recent):  Temp: 99.5 °F (37.5 °C) (06/21/19 0934)  Pulse: (!) 114 (06/21/19 0934)  Resp: 16 (06/21/19 0934)  BP: (!) 137/92 (06/21/19 0934)  SpO2: 100 % (06/21/19 0934) Vital Signs (24h Range):  Temp:  [97.9 °F (36.6 °C)-99.9 °F (37.7 °C)] 99.5 °F (37.5 °C)  Pulse:  [] 114  Resp:  [16-66] 16  SpO2:  [98 %-100 %] 100 %  BP: (127-170)/() 137/92  Arterial Line BP: (145-163)/() 161/100     Weight: 69.2 kg (152 lb 8.9 oz)  Body mass index is 28.83 kg/m².    Estimated Creatinine Clearance: 104.6 mL/min (based on SCr of 0.7 mg/dL).    Physical Exam   Constitutional: She is oriented to person, place, and time. She appears well-developed and well-nourished.   HENT:   Head: Normocephalic and atraumatic.   Eyes: Pupils are equal, round, and reactive to light.   Neck: Normal range of motion. Neck supple.   Cardiovascular: Normal rate.   Pulmonary/Chest: Effort normal and breath sounds normal.   Abdominal:  Soft. Bowel sounds are normal.   Musculoskeletal: She exhibits no edema or tenderness.   Neurological: She is alert and oriented to person, place, and time.   Skin: Skin is warm and dry.   Psychiatric: She has a normal mood and affect.       Significant Labs:   CBC:   Recent Labs   Lab 06/20/19  0300 06/20/19  1535 06/21/19  0400   WBC 11.22 13.16* 13.60*   HGB 8.4* 8.9* 8.5*   HCT 25.0* 26.8* 26.6*   PLT 82* 97* 113*     CMP:   Recent Labs   Lab 06/20/19  0300 06/20/19  1535 06/21/19  0400    138 141   K 4.2 4.5 4.6    103 104   CO2 23 28 29   * 152* 123*   BUN 17 16 13   CREATININE 0.8 0.8 0.7   CALCIUM 7.8* 8.3* 8.4*   PROT 5.3* 6.0 6.1   ALBUMIN 2.8* 2.9* 2.9*   BILITOT 0.5 0.4 0.5   ALKPHOS 143* 160* 161*   AST 30 37 44*   ALT 25 29 31   ANIONGAP 10 7* 8   EGFRNONAA >60.0 >60.0 >60.0       Significant Imaging: I have reviewed all pertinent imaging results/findings within the past 24 hours.

## 2019-06-21 NOTE — OP NOTE
DATE OF PROCEDURE:  06/18/2019.    PREOPERATIVE DIAGNOSES:  1.  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  2.  Chronic cystic fibrosis related pancreatic insufficiency.  3.  Thrombocytopenia.  4.  On immunosuppression.  5.  Hyperglycemia.  6.  Transaminitis.  7.  Chronic sinusitis.  8.  Anxiety disorder.    POSTOPERATIVE DIAGNOSES:  1.  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  2.  Chronic cystic fibrosis related pancreatic insufficiency.  3.  Thrombocytopenia.  4.  On immunosuppression.  5.  Hyperglycemia.  6.  Transaminitis.  7.  Chronic sinusitis.  8.  Anxiety disorder.    OPERATIONS PERFORMED:  1.  Redo bilateral sequential double lung transplantation with clamshell   thoracotomy, on cardiopulmonary bypass.  2.  Extensive lysis of adhesion.  3.  Bilateral pneumonectomies.  4.  Back table preparation of donor lungs.  5.  Significantly increased complexity of the case.  6.  Bronchoplasty of the left bronchial anastomosis    CO-SURGEON:  Leo Coello M.D.    FIRST ASSISTANTS:  Dr. Hayes Diaz and Valentina Gore.    SECOND CO-SURGEON:  Jonathan Newby.    HUBBARD FINDINGS OF THE OPERATION:  Increase complexity of this operation due to:  1.  Previous lung transplantation.  2.  Extensive adhesions bilaterally including the left PA plastered on to the   bronchus, requiring complete open anastomosis of the PA, as a large portion of   that had to be resected.    INDICATION OF OPERATION:  This is a 29-year-old female who underwent lung   transplantation for cystic fibrosis in 2014.  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  The donor evaluation was   carried out by Dr. Morin and was found to be a reasonable match for Ms. Ibarra.    ONSITE EVALUATION:  Onsite evaluation was carried out by Dr. Corbin who found the   lungs to be good quality and a good  wyatt for Mr. Ibarra.  Risks, benefits   and different treatment options had been discussed and informed consent was   obtained.    DESCRIPTION OF OPERATION:  The patient was brought to the Operating Room and   placed in a supine position.  After induction of anesthesia, area was prepped   and draped in the usual sterile fashion.  A clamshell incision was made in the   fifth intercostal space and a while that was being done, the right groin was   also exposed and a cutdown was performed to expose the femoral artery and   femoral vein due to the redo nature of the operation for institution of   potential cardiopulmonary bypass.  Once that was obtained, the area was packed   and attention was directed towards the clamshell incision in the intercostal   space.  The clamshell incision was made and the sternum was then divided by a   Oscillating saw, the sternal wire were removed.  The extensive adhesions were noted   bilaterally and gradually the adhesions were taken down using electrocautery.    The sternal retractor was placed.  Posteriorly, the incision was carried down   and endothoracic fascia was incised all the way to the back bilaterally.  Once   that was obtained, further dissection of the adhesions were carried out   including the mediastinal dissection of the heart and the mediastinum on the   medial aspect of the bilateral lungs.  The electrocautery setting was decreased   to 20 to decrease the chances of any thermal injury to the phrenic nerve as well   as a combination of sharp dissection and electrocautery was used to dissect out   the medial aspects of the lung and the hilar structures.  Phrenic nerve was   completely visualized and kept in view at all times.  Extensive adhesions were   noted bilaterally all around.  Profound adhesions were noted on to the   diaphragmatic surface, which were taken down under direct vision ensuring good   hemostasis.  Initially, the right lung was dissected out  completely and   circumferential control of the pulmonary artery and the pulmonary veins were   obtained.  Using Endo-GISELA vascular load stapler these structures were transected   followed by division of the bronchus.  The bronchus was trimmed to appropriate   length and good hemostasis was obtained and the lung was passed off the field.    The chest cavity was irrigated and good hemostasis was ensured.  This was   followed by similar dissection on to the left chest.  The left chest was also   dissected in a similar fashion, the inferior pulmonary and the superior   pulmonary veins were dissected out; however, extreme difficulty was noted around   the PA as it was completely plastered to the back wall of the bronchus.  At   this stage, some bleeding was also noted.  Systemic heparinization was done and   cannulation stitches were placed ____ in the venous cannula, once the ACT   greater than 450 was obtained, the patient was placed on cardiopulmonary bypass   through a long venous cannula through the right femoral artery.  The arterial   cannula was placed in the ascending aorta; however, we did not have adequate   flows at that time, another cannula was placed into the right atrium and ____   together with excellent flows.  Once that was achieved, further dissection of   the pneumonectomy of the left lung was carried out.  Pneumonectomy of the left   lung was performed in the usual standard fashion and then the lung was passed   off the field.  Microbiology specimens were collected and passed off the field.    The left bronchus was significantly scarred down at the site of the previous   anastomosis and had to be resected.  This resulted in the trachea being   completely retracted behind the descending aorta.  The pulmonary artery was also   significantly scarred and had to be resected and an open anastomosis had to be   performed.  As soon as the lungs arrived in the Operating Room, brought to the   field after  checking for ABO and serial numbers and the lungs was then brought   to the field.  No gross injury to the lungs was noted, mild-to-moderate   anthracosis was noted.  The back table preparation of the donor lung was then   initiated.  The pulmonary artery was then divided followed by pulmonary vein and   the bronchus was then transected with the help of an Endo-GISELA black load   stapler.  Once that was done, the left lung was then opened up and microbiology   specimen was submitted after cleaning.  Mild secretions were noted in the   specimen.  The bronchial stump as well as the pulmonary artery and the pulmonary   vein cuffs were prepared.  The lung was then lowered into the chest cavity on a   bed of ice and using a 4-0 PDS stitch, the left bronchial anastomosis was   initiated.  There was significant size mismatch between the bronchuses of the   donor and recipient; however, end-to-end anastomosis was carried out by doing a   bronchoscopy by making an incision on the anterior portion of the recipient   bronchus.  That helped up in opening of the bronchus. End-to-end anastomosis was   carried out ensuring there was good apposition of the structures.  Once this   was done, bronchoscopic examination of the anastomosis was carried out by the   Anesthesia Services and revealed a widely patent bronchus.  Attention was   directed towards the pulmonary artery anastomosis.  The pulmonary artery had   also been resected and was completely retracted within the pericardium.  An open   anastomosis was carried out using a 4-0 Prolene stitch in a continuous running   fashion.  As soon as the anastomosis was done, the clamp was then placed on the   donor PA.  The pulmonary vein anastomosis was then initiated making sure that   using a 4-0 Prolene stitch, ensuring that there was endothelial to endothelial   apposition.  Once that was done, clamp was removed on the pulmonary artery and   Perfadex was flushed out from the pulmonary  vein anastomosis.  Once that was   achieved, both the sutures were then tied down and needles were cut and passed   off the field.  The lap sponge behind lung was removed and the chest cavity was   emptied.  This was followed by filling of the chest cavity with warm saline and   Valsalva being given to inflate the lungs and testing for the air tightness of   the bronchus.  No air leak was noted.  Once that was achieved, root vent had   already been placed in the ascending aorta for de-airing purposes.  The blood   flow on the cardiopulmonary bypass was made pulsatile to ensure blood flow to   the newly transplanted lung.  This portion was done by myself.  After that, the   right lung was implanted by Dr. Newby.  Dr. Newby had already carried out   the pneumonectomy on the right side and similar steps were achieved on to the   right side as well as the preparation of the lung.  That would be reflected in a   separate note by Dr. Newby in the operation for lung transplantation.  Once   the donor lungs were completely prepared and implanted, bronchoscopy was also   performed on the right side.  It appeared to have a very stable and a widely   patent anastomosis.  Gradually, ventilation was resumed after ensuring no   intracardiac air.  The patient was weaned from cardiopulmonary bypass on high   root vent pass.  Test dose of protamine was given followed by full dose of   protamine to reverse the effects of systemic heparin.  Midway dose, all the   various catheters and cannulas were removed.  Good hemostasis was achieved.    Multiple drains were placed.  The chest was then closed with numerous heavy   absorbable sutures in a figure-of-eight fashion and the sternum was approximated   with stainless steel wires.  Skin and subcutaneous tissues were closed in   multiple layers.  Sterile dressing was applied.  Terminal count of needles,   instruments and sponges was found to be equal.  The patient was taken back into    the Intensive Care Unit in stable condition.    MEDICARE ATTESTATION:  Due to unavailability of an adequately trained   Cardiothoracic Surgery resident, HARVEY and Dr. Newby acted as cosurgeons and rest   of the people acted as assistants.      AB/IN  dd: 06/20/2019 19:20:29 (CDT)  td: 06/20/2019 20:23:49 (CDT)  Doc ID   #[id number]  Job ID #[id number]    CC:

## 2019-06-21 NOTE — PROGRESS NOTES
Primary team at bedside. Notified of pt remaining lethargic x3 hours. Orders to obtain another VBG now. Will carry out.

## 2019-06-21 NOTE — PT/OT/SLP PROGRESS
Speech Language Pathology      Juanita Ibarra  MRN: [id number]    Patient originally schedule for modified barium swallow study today. SLP spoke to lung transplant team and, per their instruction, patient inappropriate for MBSS at this time. SLP instructed to defer bedside swallow evaluation as well. MBS studies are not conducted over the weekend so the next available date will be 6/24/19. ST will f/u with team over the weekend to determine if bedside swallow eval is warranted or if PO trials should be deferred until MBSS is completed.     Donal Perez, CF-SLP  Speech-Language Pathology  Pager: [phone]

## 2019-06-21 NOTE — ASSESSMENT & PLAN NOTE
POD#3 s/p bilateral lung transplant (re-transplant) for CARLOS EDUARDO. 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 Remains PGD 0. S/p bronch and extubated to HFNC on POD#2. LUT primary and CTS to manage chest tubes.   - Continue immunosuppression and ppx per protocol. Continue Daily CXR.

## 2019-06-21 NOTE — PROGRESS NOTES
[facility]  Critical Care - Surgery  Progress Note    Patient Name: Juanita Ibarra  MRN: [id number]  Admission Date: 6/17/2019  Hospital Length of Stay: 3 days  Code Status: Full Code  Attending Provider: Francisca Morin DO  Primary Care Provider: Primary Doctor No   Principal Problem: S/P lung transplant    Subjective:     Hospital/ICU Course:  No notes on file    Interval History/Significant Events:    Extubated to 10 L HFNC yesterday, which she has remained stable on since. Currently off cardene gtt.  Pain control and anxiety have been an issue overnight.     Follow-up For: Procedure(s) (LRB):  REDO BILATERAL LUNG TRANSPLANT; CLAMSHELL (Bilateral)    Post-Operative Day: 2 Days Post-Op    Objective:     Vital Signs (Most Recent):  Temp: 99.5 °F (37.5 °C) (06/21/19 0934)  Pulse: (!) 114 (06/21/19 0934)  Resp: 16 (06/21/19 0934)  BP: (!) 137/92 (06/21/19 0934)  SpO2: 100 % (06/21/19 0934) Vital Signs (24h Range):  Temp:  [97.9 °F (36.6 °C)-99.9 °F (37.7 °C)] 99.5 °F (37.5 °C)  Pulse:  [] 114  Resp:  [16-66] 16  SpO2:  [98 %-100 %] 100 %  BP: (127-170)/() 137/92  Arterial Line BP: (145-163)/() 161/100     Weight: 69.2 kg (152 lb 8.9 oz)  Body mass index is 28.83 kg/m².      Intake/Output Summary (Last 24 hours) at 6/21/2019 0944  Last data filed at 6/21/2019 0900  Gross per 24 hour   Intake 1451.7 ml   Output 3610 ml   Net -2158.3 ml       Physical Exam        Constitutional: She appears well-developed and well-nourished.   Eyes: Pupils are equal, round, and reactive to light.   Neck: No JVD present.   Cardiovascular: Normal rate, regular rhythm and normal heart sounds.   Sternal incision site bandaged appropriately, C/D/I  Right chest tube with ss output  Left CT with ss output.   R IJ TLC   Pulmonary/Chest: Breath sounds normal.   On NC  Abdominal: Soft. Bowel sounds are normal. She exhibits no distension.   Nursing note and vitals reviewed.    Vents:  Vent Mode:  Spont (06/20/19 1102)  Set Rate: 12 bmp (06/20/19 0814)  Vt Set: 400 mL (06/19/19 1330)  Pressure Support: 5 cmH20 (06/20/19 1102)  PEEP/CPAP: 5 cmH20 (06/20/19 1102)  Oxygen Concentration (%): 60 (06/20/19 1556)  Peak Airway Pressure: 11 cmH2O (06/20/19 1102)  Plateau Pressure: 24 cmH20 (06/20/19 1102)  Total Ve: 5.05 mL (06/20/19 1102)  Negative Inspiratory Force (cm H2O): -34 (06/20/19 1050)  F/VT Ratio<105 (RSBI): 140.55 (06/20/19 1102)    Lines/Drains/Airways     Central Venous Catheter Line                 Introducer right internal jugular -- days         Percutaneous Central Line Insertion/Assessment - triple lumen  right internal jugular -- days         Port A Cath Single Lumen 06/24/14 1200 right subclavian 1822 days         Percutaneous Central Line Insertion/Assessment - double lumen  06/18/19 0510 3 days          Drain                 Urethral Catheter 06/18/19 0504 Non-latex;Straight-tip;Temperature probe 14 Fr. 3 days         Y Chest Tube 1 and 2 06/18/19 1151 1 Right Mediastinal 19 Fr. 2 Right Pleural 19 Fr. 2 days         Y Chest Tube 3 and 4 06/18/19 1151 3 Left Pleural 19 Fr. 4 Left Mediastinal 19 Fr. 2 days          Epidural Line                 Perineural Analgesia/Anesthesia Assessment (using dermatomes) 06/19/19 1311 1 day          Peripheral Intravenous Line                 Peripheral IV - Single Lumen 06/18/19 18 G Hand 3 days                Significant Labs:    CBC/Anemia Profile:  Recent Labs   Lab 06/20/19  0300 06/20/19  1535 06/21/19  0400   WBC 11.22 13.16* 13.60*   HGB 8.4* 8.9* 8.5*   HCT 25.0* 26.8* 26.6*   PLT 82* 97* 113*   MCV 81* 84 86   RDW 15.6* 15.8* 15.9*        Chemistries:  Recent Labs   Lab 06/19/19 2000 06/20/19  0300 06/20/19  1535 06/21/19  0400    137 138 141   K 4.0 4.2 4.5 4.6    104 103 104   CO2 23 23 28 29   BUN 16 17 16 13   CREATININE 0.8 0.8 0.8 0.7   CALCIUM 7.9* 7.8* 8.3* 8.4*   ALBUMIN 2.8* 2.8* 2.9* 2.9*   PROT 5.3* 5.3* 6.0 6.1   BILITOT 0.4  0.5 0.4 0.5   ALKPHOS 140* 143* 160* 161*   ALT 25 25 29 31   AST 33 30 37 44*   MG 2.0 1.9 2.3 2.1   PHOS 2.9 2.9 3.2  --        All pertinent labs within the past 24 hours have been reviewed.    Significant Imaging:  I have reviewed all pertinent imaging results/findings within the past 24 hours.    Assessment/Plan:     * S/P lung transplant  kati Ibarra is a 29 y.o. female with CF s/p BL Lung Transplant June 2014 complicated by bronchiolitis obliterans and left vocal chord paralysis s/p L arytenopexy/laryngoplasty(implant) and cricoid subluxation. In addition has HTN, Anxiety, chronic opioid use. Does not have DM. She is s/p bilateral lung transplant 6/18/2019. A clamshell thoracotomy was used. Transported to SICU for recovery.     Neuro:   - Hx/o chronic opioid use in post-op patient  - Sedation: Propofol, ketamine  - Scheduled tylenol; PRN oxy, dilaudid   - PRN versed  - Dilaudid PCA    Pulmonary:   - Extubated 6/20  - Saturating well on 10 L HFNC  - Wean supplemental O2 as tolerated  - ABGs PRN     Cardiac:  - Drips: Cardene for MAP < 80  - Currently off cardene  - Keep MAPs >65  - HTN   -Hold home anti-hypertensives in acute setting   - Monitor chest tube output and character    Renal:   - Trend BUN/Cr  - BUN/Cr 13/0.7, stable  - Monitor UOP  - UOP good     ID:   - Afebrile, WBC 13.6 from 11.2 overnight  - Abx:Meropenem, IV Vanc, fluconazole, ganciclovir  - Give benadryl prior to Vanc administration to prevent red man syndrome  - Will continue to monitor for signs of infection   - ID consulted, f/u recs    Immuno:  - Cellcept, tacro, steroid taper  - Immunomodulatory drugs per CTS  - Basiliximab q4 days x2 doses     Hem:  - Trend Hgb post-op  - No signs of active bleeding     Endocrine:  - Insulin gtt   - Monitor BG     FEN/GI:   - NPO   - Replace lytes PRN    Dispo:  - Continue ICU Care  - Wean supplemental O2    Primary:   CTS    Critical care was time spent personally by me on the following  activities: development of treatment plan with patient or surrogate and bedside caregivers, discussions with consultants, evaluation of patient's response to treatment, examination of patient, ordering and performing treatments and interventions, ordering and review of laboratory studies, ordering and review of radiographic studies, pulse oximetry, re-evaluation of patient's condition.  This critical care time did not overlap with that of any other provider or involve time for any procedures.     Pradip Snyder MD  Critical Care - Surgery  [facility]

## 2019-06-21 NOTE — PLAN OF CARE
"Problem: Adult Inpatient Plan of Care  Goal: Plan of Care Review  Outcome: Ongoing (interventions implemented as appropriate)  Dx: S/P lung transplant    Shift Events: Pt restless and agitated throughout the night and required PRN anxiety/pain medication around-the-clock. Precedex gtt titrated to keep pt comfortable. Pt on 10L HFNC with sats remaining 100%.     Neuro: AAO x4, Arouses to Voice, Follows Commands and Moves All Extremities    Vital Signs: BP (!) 147/104 (BP Location: Right arm, Patient Position: Lying)   Pulse 103   Temp 99.5 °F (37.5 °C) (Core Bladder)   Resp (!) 33   Ht 5' 1" (1.549 m)   Wt 69.2 kg (152 lb 8.9 oz)   LMP 10/02/2016   SpO2 100%   Breastfeeding? No   BMI 28.83 kg/m²     Diet: NPO    Gtts: Precedex and Insulin and Ketamine    Urine Output: Urinary Catheter 100-275 cc/hour    Drains: Chest Tube, total output 150 cc /  shift     Labs/Accuchecks: Daily labs; Accuchecks Q4hr    Skin: CDI; heels, sacrum and elbows without breakdown         "

## 2019-06-21 NOTE — PLAN OF CARE
Problem: Adult Inpatient Plan of Care  Goal: Plan of Care Review  Outcome: Ongoing (interventions implemented as appropriate)  Pt re-intubated this shift. Bronchoscopy per Dr. Morin. See progress notes for shift events.  Vent settings: A/C rate 20, FiO2 40%, 5 PEEP. SpO2 98%. Frequent VBGs obtained throughout shift.   Pt follows commands on sedation vacation.   OGT placed, awaiting xray confirmation at this time.   Propofol, Precedex, and Fentanyl gtts infusing for sedation/comfort.   Insulin gtt @ 0.4 units/hr, accucheck q4h.  Chest tube output serosanguineous, right with 180 mL and left with 15 mL this shift.  UO adequate,  mL/hr.   POC reviewed with spouse, all questions and concerns addressed.

## 2019-06-21 NOTE — ASSESSMENT & PLAN NOTE
- CMV D-/R+. Continue OIP with ganciclovir, fluconazole, and dapsone.   - Continue Merrem/Vanc for routine surgical ppx based on previous sensitivities.   - Cx from BAL from 6/20 NGTD. ID following appreciate recs.

## 2019-06-22 PROBLEM — Z16.13 INFECTION DUE TO CARBAPENEM RESISTANT PSEUDOMONAS AERUGINOSA: Status: ACTIVE | Noted: 2019-01-01

## 2019-06-22 PROBLEM — A49.8 INFECTION DUE TO CARBAPENEM RESISTANT PSEUDOMONAS AERUGINOSA: Status: ACTIVE | Noted: 2019-01-01

## 2019-06-22 PROBLEM — Z76.82 AWAITING ORGAN TRANSPLANT: Status: RESOLVED | Noted: 2019-01-01 | Resolved: 2019-01-01

## 2019-06-22 NOTE — ANESTHESIA POST-OP PAIN MANAGEMENT
Bilateral TARA catheters bolused. 60cc total of 0.3% Ropivacaine with Epi, Clonidine, and Dexamethasone administered.

## 2019-06-22 NOTE — PROGRESS NOTES
"[facility]  Endocrinology  Progress Note    Admit Date: 6/17/2019     Reason for Consult: Management of  Hyperglycemia and CF related pancreatic insufficiency.     Surgical Procedure and Date: lung transplant in 2014; 6/18/19    HPI:   Patient is a 29 y.o. female with a diagnosis of CF, HTN, migraine headache, and osteopenia. 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 No personal history of DM. Endocrinology consulted for BG management.     Lab Results   Component Value Date    HGBA1C 4.7 01/09/2019                 Interval HPI:   Overnight events: Remains in ICU. Re-intubated yesterday. BG above goal on insulin infusion at 0.4 u/hr with correction scale. Prednisone 70 mg; steroid taper per primary.   Eating:   NPO  Nausea: No  Hypoglycemia and intervention: No  Fever: No  TPN and/or TF: No    BP (!) 172/100   Pulse 80   Temp 97.7 °F (36.5 °C) (Core Bladder)   Resp (!) 39   Ht 5' 1" (1.549 m)   Wt 67 kg (147 lb 11.3 oz)   LMP 10/02/2016   SpO2 99%   Breastfeeding? No   BMI 27.91 kg/m²      Labs Reviewed and Include    Recent Labs   Lab 06/22/19  0534   *   CALCIUM 8.1*   ALBUMIN 2.3*   PROT 5.3*      K 3.8   CO2 27      BUN 12   CREATININE 0.7   ALKPHOS 124   ALT 23   AST 22   BILITOT 0.4     Lab Results   Component Value Date    WBC 7.82 06/22/2019    HGB 7.6 (L) 06/22/2019    HCT 23.3 (L) 06/22/2019    MCV 84 06/22/2019     (L) 06/22/2019     No results for input(s): TSH, FREET4 in the last 168 hours.  Lab Results   Component Value Date    HGBA1C 4.7 01/09/2019       Nutritional status:   Body mass index is 27.91 kg/m².  Lab Results   Component Value Date    ALBUMIN 2.3 (L) 06/22/2019    ALBUMIN 2.3 (L) 06/22/2019    ALBUMIN 2.9 (L) 06/21/2019     Lab Results   Component Value Date    PREALBUMIN 15 (L) 05/29/2014    PREALBUMIN 11 (L) 05/09/2014    PREALBUMIN 18 (L) 03/19/2014       Estimated Creatinine " Clearance: 103 mL/min (based on SCr of 0.7 mg/dL).    Accu-Checks  Recent Labs     06/20/19  1400 06/20/19  1534 06/20/19  1944 06/21/19  0008 06/21/19  0354 06/21/19  0808 06/21/19  1145 06/21/19  1550 06/22/19  0001 06/22/19  0408   POCTGLUCOSE 139* 145* 123* 208* 148* 140* 197* 176* 256* 211*       Current Medications and/or Treatments Impacting Glycemic Control  Immunotherapy:    Immunosuppressants         Stop Route Frequency     tacrolimus (PROGRAF) 1 mg/mL oral syringe      -- PER NG TUBE 2 times daily     mycophenolate (CELLCEPT) 500 mg in dextrose 5 % 100 mL IVPB      -- IV 2 times daily     basiliximab (SIMULECT) 20 mg in dextrose 5 % 50 mL chemo infusion      06/26 1514 IV Every 96 hours        Steroids:   Hormones (From admission, onward)    Start     Stop Route Frequency Ordered    06/25/19 0900  predniSONE tablet 25 mg      -- PER NG TUBE Daily 06/21/19 1743    06/24/19 0900  predniSONE tablet 35 mg      06/25 0859 PER NG TUBE Daily 06/21/19 1743    06/23/19 0900  predniSONE tablet 50 mg      06/24 0859 PER NG TUBE Daily 06/21/19 1743    06/22/19 0900  predniSONE tablet 70 mg      06/23 0859 PER NG TUBE Daily 06/21/19 1743        Pressors:    Autonomic Drugs (From admission, onward)    None        Hyperglycemia/Diabetes Medications:   Antihyperglycemics (From admission, onward)    Start     Stop Route Frequency Ordered    06/20/19 1545  insulin regular (Humulin R) 100 Units in sodium chloride 0.9% 100 mL infusion      -- IV Continuous 06/20/19 1442    06/20/19 1542  insulin aspart U-100 pen 0-4 Units      -- SubQ As needed (PRN) 06/20/19 1442          ASSESSMENT and PLAN    * S/P lung transplant  Managed per LUT.   avoid hypoglycemia        Hyperglycemia  BG goal 140 - 180.       increase insulin infusion at 0.6 u/hr.   BG monitoring every 4 hours and low dose correction scale.         Adrenal cortical steroids causing adverse effect in therapeutic use  Glucocorticoids markedly increase  glucoses.  Expect the steroid taper will help glucose control.         Prophylactic immunotherapy  May increase insulin resistance.       CF related Pancreatic insufficiency  May impact BG.           Amy Zapata NP  Endocrinology  [facility]

## 2019-06-22 NOTE — ASSESSMENT & PLAN NOTE
Pseudomonas from 6/20 bronchial wash. Meropenem d/c. Ciprofloxacin 400 mg Q8hrs started. Tobramycin x1 today.

## 2019-06-22 NOTE — ASSESSMENT & PLAN NOTE
- Begin Viokace enzymes with trickle tube feedings today. Hold VioKace enzyme supplementation when tube feeds are held.

## 2019-06-22 NOTE — PLAN OF CARE
Problem: Adult Inpatient Plan of Care  Goal: Plan of Care Review  POC reviewed with pt and spouse at bedside, VSS, SBP maintained <160, Cardene gtt restarted for ~ 2 hrs, sats 100%, unless she starts waking up and becomes anxious and tachypneic, SvO2 51 this am, follows commands on lower sedation. Remains intubated on A/C PC 40% and PEEP8, lung sound very coarse, xray obtained this am, CTs in place with minimal serosang drainage, to suction. Abd taut, lactulose given per order, no BM this shift. UOP >100 mL/hr, CVP 10,8, 6 respectively. Propofol and Precedex gtt infusing for sedation, Fentanyl gtt infusing for pain, titrated per comfort level. Pt turned and repositioned q2h, no skin breakdown noted. All questions answered and addressed, spouse verbalized understanding and compliance.

## 2019-06-22 NOTE — ASSESSMENT & PLAN NOTE
Previously on tacrolimus, MMF, and daily prednisone. Received solumedrol in OR and basiliximab upon arrival to ICU. Repeat basiliximab on POD#4.   - Continue MMF, tacrolimus, and steroid taper. Monitor daily tacrolimus levels and adjust dose as needed.

## 2019-06-22 NOTE — ASSESSMENT & PLAN NOTE
- CMV D-/R+. Continue OIP with ganciclovir, fluconazole, and dapsone.   -Previously on Merrem/Vanc for routine surgical ppx based on previous sensitivities.   -Bronchial wash culture from 6/20 with  Pseudomonas. Continue vancomycin. Meropenem d/c. Plan to start ciprofloxacin 400 mg Q8hrs and tobramycin x1.

## 2019-06-22 NOTE — SUBJECTIVE & OBJECTIVE
Subjective:     Interval History: Re-intubated yesterday afternoon due to persistently altered mental status and for airway protection. Remains intubated and sedated with Precedex, propofol, and fentanyl. Lung compliance improving this AM on rounds and Pi decreased from 22 to 18 with goal to maintain tidal volumes ~390. VBG this morning 7.45/38/26/26.9. No BM despite lactulose/Miralax administration overnight. Plan to start trickle tube feeds and suppository.     Remains on insulin infusion per endocrine. Discussion with staff anesthesiologist this morning about pain management plans once patient is ready for extubation and will plan for low dose ketamine with hydromorphone PCA. No plans for extubation today.       Continuous Infusions:   dexmedetomidine (PRECEDEX) infusion 1.4 mcg/kg/hr (06/22/19 1200)    dextrose 5 % and 0.45 % NaCl with KCl 20 mEq 5 mL/hr at 06/22/19 1200    fentanyl 25 mL/hr at 06/22/19 1200    insulin (HUMAN R) infusion (adults) 0.6 Units/hr (06/22/19 1200)    nicardipine Stopped (06/22/19 0400)    propofol 45 mcg/kg/min (06/22/19 1300)     Scheduled Meds:   basiliximab (SIMULECT) chemo infusion  20 mg Intravenous Q96H    chlorhexidine  10 mL Mouth/Throat BID    ciprofloxacin  400 mg Intravenous Q8H    enoxaparin  40 mg Subcutaneous Daily    fluconazole (DIFLUCAN) IVPB  400 mg Intravenous Q24H    ganciclovir (CYTOVENE) IVPB  5 mg/kg Intravenous Q12H    lactulose  20 g Per OG tube TID    levalbuterol  1.25 mg Nebulization Q8H WA    mupirocin  1 g Nasal BID    mycophenolate (CELLCEPT) IVPB  500 mg Intravenous BID    polyethylene glycol  17 g Per NG tube Daily    [START ON 6/25/2019] predniSONE  25 mg Per NG tube Daily    [START ON 6/24/2019] predniSONE  35 mg Per NG tube Daily    [START ON 6/23/2019] predniSONE  50 mg Per NG tube Daily    tacrolimus  1 mg Per NG tube BID    tobramycin (NEBCIN) IVPB (once daily administration)  10 mg/kg (Adjusted) Intravenous Once    [START  ON 6/23/2019] vancomycin (VANCOCIN) IVPB  750 mg Intravenous Q24H     PRN Meds:bisacodyl, Dextrose 10% Bolus, Dextrose 10% Bolus, dextrose 50%, dextrose 50%, diphenhydrAMINE, glucagon (human recombinant), glucose, glucose, insulin aspart U-100, lactulose, levalbuterol, levalbuterol, lipase-protease-amylase (VIOKACE) 20,880-78,300- 78,300 units, magnesium sulfate IVPB, metoclopramide HCl, naloxone, ondansetron, ondansetron, oxyCODONE, oxyCODONE, potassium chloride in water **AND** potassium chloride in water **AND** potassium chloride in water, promethazine (PHENERGAN) IVPB    Review of patient's allergies indicates:   Allergen Reactions    Albuterol Palpitations    Colistin Anaphylaxis    Vancomycin analogues      Infusion reaction that does not resolve with slowing  Pt. States she can tolerate it when given with 50 mg Benadryl and ran over 3 hours    Neupogen [filgrastim] Other (See Comments)     Ostealgia after five daily doses of 300 mcg.      Bactrim [sulfamethoxazole-trimethoprim] Hives    Ceftazidime Hives     Pt stated can tolerate cefapine not ceftazidime    Ceftazidime     Dronabinol Other (See Comments)     Mental changes/hallucinations    Haldol [haloperidol lactate] Other (See Comments)     Seizure like activity    Nsaids (non-steroidal anti-inflammatory drug)      Cannot have due to lung transplant    Adhesive Rash     Cloth tape- please use tegaderm or paper tape    Aztreonam Rash    Ciprofloxacin Nausea And Vomiting     Projectile N/V, per patient.  Unwilling to retry therapy.       Review of Systems   Unable to perform ROS: Intubated     Objective:   Physical Exam   Constitutional: She appears well-developed and well-nourished. No distress. She is sedated and intubated.   HENT:   Head: Normocephalic and atraumatic.   Right Ear: External ear normal.   Left Ear: External ear normal.   Nose: Nose normal.   ETT and OGT   Eyes: Conjunctivae and EOM are normal.   Neck: Neck supple. No JVD  present.   Right IJ CVC with dressing c/d/i; right subclavian port with dressing c/d/i   Cardiovascular: Normal rate and regular rhythm. Exam reveals friction rub.   Murmur heard.  Pulmonary/Chest: She is intubated. No respiratory distress. She has decreased breath sounds in the right middle field, the right lower field and the left lower field. She has no wheezes. She has rhonchi. She has rales in the right upper field and the left upper field.   Bilateral pleural and mediastinal chest tubes with sanguinous output, bilateral airleak   Abdominal: Bowel sounds are normal. She exhibits distension. There is no tenderness.   Protuberant abdomen   Genitourinary:   Genitourinary Comments: Goldberg to gravity   Musculoskeletal: She exhibits no edema.   Skin: Skin is warm and dry. She is not diaphoretic. There is pallor.   Psychiatric: Her mood appears anxious. Her affect is labile. She is agitated and hyperactive.   Nursing note and vitals reviewed.        Vital Signs (Most Recent):  Temp: 98.1 °F (36.7 °C) (06/22/19 1247)  Pulse: 72 (06/22/19 1300)  Resp: 20 (06/22/19 1300)  BP: (!) 154/108 (06/22/19 1300)  SpO2: 100 % (06/22/19 1300) Vital Signs (24h Range):  Temp:  [97 °F (36.1 °C)-100.9 °F (38.3 °C)] 98.1 °F (36.7 °C)  Pulse:  [] 72  Resp:  [13-39] 20  SpO2:  [94 %-100 %] 100 %  BP: (122-173)/() 154/108     Weight: 67 kg (147 lb 11.3 oz)  Body mass index is 27.91 kg/m².      Intake/Output Summary (Last 24 hours) at 6/22/2019 1333  Last data filed at 6/22/2019 1300  Gross per 24 hour   Intake 2992.05 ml   Output 3165 ml   Net -172.95 ml       Ventilator Data:     Vent Mode: A/C  Oxygen Concentration (%):  [] 40  Resp Rate Total:  [18 br/min-27 br/min] 22 br/min  Vt Set:  [290 mL] 290 mL  PEEP/CPAP:  [3 cmH20-8 cmH20] 8 cmH20  Mean Airway Pressure:  [8.1 qoV88-19 cmH20] 13 cmH20    Hemodynamic Parameters:       Lines/Drains:       Introducer right internal jugular (Active)   Specific Qualities Other  (Comment) 6/22/2019  7:15 AM   Dressing Status Biopatch in place;Clean;Dry;Intact 6/22/2019  7:15 AM   Dressing Intervention Other (Comment) 6/22/2019  7:15 AM   Dressing Change Due 06/25/19 6/22/2019  7:15 AM   Daily Line Review Performed 6/22/2019  7:15 AM   Number of days:             Port A Cath Single Lumen 06/24/14 1200 right subclavian (Active)   Accessed by: Radha Ortega RN 6/17/2019  8:00 PM   Dressing Type Transparent 6/22/2019  7:15 AM   Dressing Status Biopatch in place;Clean;Dry;Intact 6/22/2019  7:15 AM   Dressing Intervention Other (Comment) 6/22/2019  7:15 AM   Dressing Change Due 06/24/19 6/22/2019  7:15 AM   Line Status Saline locked 6/22/2019  7:15 AM   Flush Performed No 6/22/2019  7:15 AM   Date to be Reflushed 06/18/19 6/17/2019  8:00 PM   Daily Line Review Performed 6/22/2019  7:15 AM   Type of Needle Nicole 6/22/2019  3:00 AM   Gauge 20 6/22/2019  3:00 AM   Needle Length 1 in 6/22/2019  3:00 AM   Needle Status Left in place 6/22/2019  3:00 AM   Needle Insertion Date 06/17/19 6/22/2019  3:00 AM   Needle Insertion Time 1900 6/17/2019  8:00 PM   Number of days: 1824            Percutaneous Central Line Insertion/Assessment - double lumen  06/18/19 0510 (Active)   Dressing biopatch in place;dressing dry and intact 6/22/2019  7:15 AM   Securement secured w/ sutures 6/22/2019  7:15 AM   Additional Site Signs no drainage;no streak formation;no palpable cord;no pain;no edema;no warmth;no erythema 6/22/2019  7:15 AM   Distal Patency/Care infusing 6/22/2019  7:15 AM   Proximal Patency/Care infusing 6/22/2019  7:15 AM   Waveform normal 6/22/2019  3:00 AM   Line Interventions line leveled/zeroed 6/22/2019  3:00 AM   Dressing Change Due 06/28/19 6/22/2019  7:15 AM   Daily Line Review Performed 6/22/2019  7:15 AM   Number of days: 4            Percutaneous Central Line Insertion/Assessment - triple lumen  right internal jugular (Active)   Dressing biopatch in place;dressing dry and intact 6/22/2019   "7:15 AM   Securement secured w/ sutures 6/22/2019  7:15 AM   Additional Site Signs no drainage;no streak formation;no palpable cord;no pain;no edema;no warmth;no erythema 6/22/2019  7:15 AM   Distal Patency/Care infusing 6/22/2019  7:15 AM   Medial Patency/Care infusing 6/22/2019  7:15 AM   Proximal Patency/Care infusing 6/22/2019  7:15 AM   Waveform normal 6/22/2019  3:00 AM   Line Interventions line leveled/zeroed 6/22/2019  3:00 AM   Dressing Change Due 06/28/19 6/22/2019  7:15 AM   Daily Line Review Performed 6/22/2019  7:15 AM   Number of days:             NG/OG Tube 06/21/19 1715 Center mouth (Active)   Placement Check placement verified by x-ray;placement verified by aspirate characteristics 6/22/2019  7:15 AM   Tolerance no signs/symptoms of discomfort 6/22/2019  7:15 AM   Securement secured to commercial device 6/22/2019  7:15 AM   Clamp Status/Tolerance unclamped 6/22/2019  7:15 AM   Suction Setting/Drainage Method suction at;low;intermittent setting 6/22/2019  7:15 AM   Insertion Site Appearance no redness, warmth, tenderness, skin breakdown, drainage 6/22/2019  7:15 AM   Drainage Brown;Clear 6/22/2019  7:15 AM   Flush/Irrigation flushed w/;water;no resistance met 6/22/2019  7:15 AM   Intake (mL) 150 mL 6/22/2019  9:00 AM   Tube Output(mL)(Include Discarded Residual) 0 mL 6/22/2019  7:00 AM   Number of days: 0            Urethral Catheter 06/18/19 0504 Non-latex;Straight-tip;Temperature probe 14 Fr. (Active)   Site Assessment Clean;Intact 6/22/2019  7:15 AM   Collection Container Urimeter 6/22/2019  7:15 AM   Securement Method secured to top of thigh w/ adhesive device 6/22/2019  7:15 AM   Catheter Care Performed yes 6/22/2019  7:15 AM   Reason for Continuing Urinary Catheterization Critically ill in ICU requiring intensive monitoring;Post operative 6/22/2019  7:15 AM   CAUTI Prevention Bundle StatLock in place w 1" slack;Intact seal between catheter & drainage tubing;Green sheeting clip in use;Drainage " bag off the floor;No dependent loops or kinks;Drainage bag not overfilled (<2/3 full);Drainage bag below bladder 6/22/2019  7:15 AM   Output (mL) 225 mL 6/22/2019  1:00 PM   Number of days: 4            Y Chest Tube 1 and 2 06/18/19 1151 1 Right Mediastinal 19 Fr. 2 Right Pleural 19 Fr. (Active)   Function -20 cm H2O 6/22/2019  7:15 AM   Air Leak/Fluctuation air leak not present;fluctuation not present 6/22/2019  7:15 AM   Safety all tubing connections taped;2 rubber-tipped hemostats w/ patient;all connections secured;suction checked 6/22/2019  7:15 AM   Securement tubing secured to body distal to insertion site w/ tape 6/22/2019  7:15 AM   Left Subcutaneous Emphysema none present 6/22/2019  7:15 AM   Right Subcutaneous Emphysema none present 6/22/2019  7:15 AM   Patency Intervention Tip/tilt 6/22/2019  7:15 AM   Drainage Description 1 Serosanguineous 6/22/2019  7:15 AM   Tube 1 Dressing Appearance occlusive gauze dressing intact;clean and dry 6/22/2019  7:15 AM   Tube 1 Dressing Care dressing changed 6/21/2019  3:15 PM   Site Assessment 1 Not assessed;Other (Comment) 6/22/2019  7:15 AM   Surrounding Skin 1 Dry;Intact 6/22/2019  7:15 AM   Drainage Description 2 Serosanguineous 6/22/2019  7:15 AM   Tube 2 Dressing Appearance occlusive gauze dressing intact;clean and dry 6/22/2019  7:15 AM   Tube 2 Dressing Care dressing changed 6/21/2019  3:15 PM   Site Assessment 2 Not assessed;Other (Comment) 6/22/2019  7:15 AM   Surrounding Skin Unable to view 6/22/2019  7:15 AM   Output (mL) 0 mL 6/22/2019 11:00 AM   Number of days: 4            Y Chest Tube 3 and 4 06/18/19 1151 3 Left Pleural 19 Fr. 4 Left Mediastinal 19 Fr. (Active)   Function -20 cm H2O 6/22/2019  7:15 AM   Air Leak/Fluctuation air leak present;fluctuation present;connections tightened 6/22/2019  7:15 AM   Safety all connections secured;all tubing connections taped;2 rubber-tipped hemostats w/ patient;suction checked 6/22/2019  7:15 AM   Securement tubing  secured to body distal to insertion site w/ tape 6/22/2019  7:15 AM   Left Subcutaneous Emphysema none present 6/22/2019  7:15 AM   Right Subcutaneous Emphysema none present 6/22/2019  7:15 AM   Patency Intervention Tip/tilt 6/22/2019  7:15 AM   Drainage Description 3 Serosanguineous 6/22/2019  7:15 AM   Tube 3 Dressing Appearance occlusive gauze dressing intact;clean and dry 6/22/2019  7:15 AM   Tube 3 Dressing Care dressing reinforced 6/22/2019  7:15 AM   Site Assessment 3 Not assessed;Other (Comment) 6/22/2019  7:15 AM   Surrounding Skin 3 Unable to view 6/22/2019  7:15 AM   Drainage Description 4 Serosanguineous 6/22/2019  7:15 AM   Tube 4 Dressing Appearance occlusive gauze dressing intact;clean and dry 6/22/2019  7:15 AM   Tube 4 Dressing Care dressing reinforced 6/22/2019  7:15 AM   Site Assessment 4 Not assessed;Other (Comment) 6/22/2019  7:15 AM   Surrounding Skin 4 Dry;Intact 6/22/2019  3:00 AM   Output (mL) 20 mL 6/22/2019 11:00 AM   Number of days: 4       Significant Labs:  CBC:  Recent Labs   Lab 06/22/19  0534   WBC 7.82   RBC 2.76*   HGB 7.6*   HCT 23.3*   *   MCV 84   MCH 27.5   MCHC 32.6     BMP:  Recent Labs   Lab 06/22/19  0534      K 3.8      CO2 27   BUN 12   CREATININE 0.7   CALCIUM 8.1*      Tacrolimus Levels:  Recent Labs   Lab 06/22/19  0534   TACROLIMUS 2.1*     Microbiology:  Microbiology Results (last 7 days)     Procedure Component Value Units Date/Time    Culture, Respiratory with Gram Stain [[id number]]  (Susceptibility) Collected:  06/20/19 0808    Order Status:  Completed Specimen:  Respiratory from Bronchial Wash Updated:  06/22/19 1018     Respiratory Culture No S aureus isolated.     Respiratory Culture --     PSEUDOMONAS AERUGINOSA   Rare       Gram Stain (Respiratory) <10 epithelial cells per low power field.     Gram Stain (Respiratory) No WBC's     Gram Stain (Respiratory) No organisms seen    AFB Culture & Smear [[id number]] Collected:  06/20/19 0808     Order Status:  Completed Specimen:  Respiratory from Bronchial Wash Updated:  06/21/19 2127     AFB Culture & Smear Culture in progress     AFB CULTURE STAIN No acid fast bacilli seen.    Fungus culture [[id number]]     Order Status:  Canceled Specimen:  Respiratory from Bronchial Wash     Culture, Respiratory with Gram Stain [[id number]]     Order Status:  Canceled Specimen:  Respiratory from Bronchial Wash     AFB Culture & Smear [[id number]]     Order Status:  Canceled Specimen:  Respiratory from Bronchial Wash     Aerobic culture [[id number]] Collected:  06/18/19 0930    Order Status:  Completed Specimen:  Tissue from Lung, Left Updated:  06/20/19 1323     Aerobic Bacterial Culture --     CANDIDA ALBICANS  Few  No other significant isolate      Narrative:       Donor lung    Culture, Anaerobe [[id number]] Collected:  06/18/19 0930    Order Status:  Completed Specimen:  Tissue from Lung, Left Updated:  06/20/19 1040     Anaerobic Culture Culture in progress    Narrative:       Donor lung    Fungus culture [[id number]] Collected:  06/20/19 0808    Order Status:  Sent Specimen:  Respiratory from Bronchial Wash Updated:  06/20/19 0936    AFB Culture & Smear [[id number]] Collected:  06/18/19 0930    Order Status:  Completed Specimen:  Tissue from Lung, Left Updated:  06/19/19 2127     AFB Culture & Smear Culture in progress     AFB CULTURE STAIN No acid fast bacilli seen.    Narrative:       Donor lung    Urine culture [[id number]] Collected:  06/17/19 1753    Order Status:  Completed Specimen:  Urine, Clean Catch Updated:  06/19/19 0117     Urine Culture, Routine No significant growth    Narrative:       Indicated criteria for high risk culture:->Other  Other (specify):->awaiting lung transplant    Gram stain [[id number]] Collected:  06/18/19 0930    Order Status:  Completed Specimen:  Tissue from Lung, Left Updated:  06/18/19 1041     Gram Stain Result Moderate WBC's      No organisms seen    Narrative:       Donor lung     Fungus culture [[id number]] Collected:  06/18/19 0930    Order Status:  Sent Specimen:  Tissue from Lung, Left Updated:  06/18/19 0944          I have reviewed all pertinent labs within the past 24 hours.    Diagnostic Results:  Chest X-Ray: I personally reviewed the films and findings are: increased opacification in the right middle and lower lung fields    No Further

## 2019-06-22 NOTE — ASSESSMENT & PLAN NOTE
- Anesthesia consulted for pain management. Continue aggressive bowel regimen.   - Previously on ketamine 25 infusion. Discussed with anesthesia staff today. Will try to transition to ketamine 5 drip and hydromorphone PCA once ready to extubate.   - Continue Q12 Bupivacaine in TARA catheters.

## 2019-06-22 NOTE — ASSESSMENT & PLAN NOTE
BG goal 140 - 180.       increase insulin infusion at 1 u/hr.   BG monitoring every 4 hours and low dose correction scale.

## 2019-06-22 NOTE — PLAN OF CARE
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 Ketamine gtt started. Extubated to HFNC. OOBTC x4 hrs.  6/21: Re-intubated, bronch  6/22: tube feeds started    Nurse:  SBP <160  Accu Q4H     Outcome: Ongoing (interventions implemented as appropriate)  Pt remained free from falls and injuries this shift. Contact isolation initiated for  in sputum. VSS on AC 40% 8 PEEP. Propofol, Precedex and Fentanyl titrated for comfort/pain and RASS goals. Insulin gtt increased per endocrine and PRN novolog given. Pt arouses to voice and follows commands. Tube feeds started. To remain at 20cc/hr and be held at midnight for possible bronchoscopy tomorrow. Turned q2 and heels floated with silicone dressings over heels and sacrum. No skin breakdown noted to any bony prominences. Significant other, Shawn updated throughout day by RN, MD and PAGaneshc. All questions encouraged and answered.

## 2019-06-22 NOTE — ADDENDUM NOTE
Addendum  created 06/22/19 1541 by Carlotta Fragoso MD    Charge Capture section accepted, Sign clinical note

## 2019-06-22 NOTE — ASSESSMENT & PLAN NOTE
29-year-old female with history of CF s/p bilateral sequential lung transplant 6/2014 c/b CARLOS EDUARDO and end stage lung disease, admitted 6/18/2019 for bilateral lung re-transplant (CMV D-/R+, basiliximab induction, on tacro/MMF/pred).  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  Patient with history of MRSA and Pseudomonas pneumonia - ID consulted for antibiotic recommendations. Now with PsA PCR + from BAL and GNR growing      Recommendations:  - Continue vancomycin, meropenem IV  - Follow-up donor lung cultures  - Prophylaxis per lung transplant protocol - on dapsone, fluconazole, ganciclovir

## 2019-06-22 NOTE — PROGRESS NOTES
MEDICAL NUTRITION THERAPY  Reason for consult: TF recommendations    Received call from Jacqueline OSBORNE regarding beginning trickle TF. Reviewed chart. Pt currently receiving propofol @ 18.7mL/hr to provide 494 kcals/day.     Recommendations:  1.) Begin Impact Peptide 1.5 @ 10mL/hr advancing per MD to goal rate 35mL/hr to provide 1260 kcals, 79gm, 647mL of free water. If GRV >500mL, hold TF q4h then restart regimen. TF + propofol will meet 104% EEN and 81% EPN.   2.) RD to continue following per protocol.     Basilia Asencio RD, LDN

## 2019-06-22 NOTE — PROGRESS NOTES
[facility]  Infectious Disease  Progress Note    Patient Name: Juanita Ibarra  MRN: [id number]  Admission Date: 6/17/2019  Length of Stay: 4 days  Attending Physician: Francisca Morin DO  Primary Care Provider: Primary Doctor No    Isolation Status: No active isolations  Assessment/Plan:      * S/P lung transplant  29-year-old female with history of CF s/p bilateral sequential lung transplant 6/2014 c/b CARLOS EDUARDO and end stage lung disease, admitted 6/18/2019 for bilateral lung re-transplant (CMV D-/R+, basiliximab induction, on tacro/MMF/pred).  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  Patient with history of MRSA and Pseudomonas pneumonia - ID consulted for antibiotic recommendations. Now with PsA PCR + from BAL and GNR growing      Recommendations:  - Continue vancomycin, meropenem IV  - Follow-up donor lung cultures  - Prophylaxis per lung transplant protocol - on dapsone, fluconazole, ganciclovir        Anticipated Disposition: pending    Thank you for your consult. I will follow-up with patient. Please contact us if you have any additional questions.    Dannie Juan MD  Infectious Disease  [facility]    Subjective:     Principal Problem:S/P lung transplant    HPI: 29-year-old female with history of CF s/p bilateral sequential lung transplant 6/2014 c/b CARLOS EDUARDO and end stage lung disease, admitted 6/18/2019 for bilateral lung re-transplant (CMV D-/R+, basiliximab induction, on tacro/MMF/pred).  ID consulted for antibiotic recommendations.  Patient currently on vanc / beatrice based on prior culture results.  Interval History:100.4 yesterday, PsA PCR from BAL +, GNR growing, reinutbated for hypoxic respiratory failure    Review of Systems   Unable to perform ROS: Intubated     Objective:     Vital Signs (Most Recent):  Temp: 97.7 °F (36.5 °C) (06/22/19 0803)  Pulse: 75 (06/22/19 0803)  Resp: 20  (06/22/19 0803)  BP: (!) 131/91 (06/22/19 0800)  SpO2: 99 % (06/22/19 0803) Vital Signs (24h Range):  Temp:  [97.3 °F (36.3 °C)-100.9 °F (38.3 °C)] 97.7 °F (36.5 °C)  Pulse:  [] 75  Resp:  [16-53] 20  SpO2:  [94 %-100 %] 99 %  BP: (122-173)/() 131/91     Weight: 67 kg (147 lb 11.3 oz)  Body mass index is 27.91 kg/m².    Estimated Creatinine Clearance: 103 mL/min (based on SCr of 0.7 mg/dL).    Physical Exam   Constitutional: She appears well-developed and well-nourished.   HENT:   Head: Normocephalic and atraumatic.   intubated   Eyes: Pupils are equal, round, and reactive to light.   Neck: Normal range of motion. Neck supple.   Cardiovascular: Normal rate.   Pulmonary/Chest: Effort normal and breath sounds normal.   Abdominal: Soft. Bowel sounds are normal.   Musculoskeletal: She exhibits no edema or tenderness.   Neurological:   sedated   Skin: Skin is warm and dry.   Op dressing chest wall   Psychiatric: She has a normal mood and affect.       Significant Labs:   CBC:   Recent Labs   Lab 06/20/19  1535 06/21/19  0400 06/22/19  0534   WBC 13.16* 13.60* 7.82   HGB 8.9* 8.5* 7.6*   HCT 26.8* 26.6* 23.3*   PLT 97* 113* 105*     CMP:   Recent Labs   Lab 06/21/19  0400 06/22/19  0422 06/22/19  0534    135* 138   K 4.6 3.5 3.8    101 103   CO2 29 26 27   * 180* 209*   BUN 13 12 12   CREATININE 0.7 0.7 0.7   CALCIUM 8.4* 8.3* 8.1*   PROT 6.1 5.4* 5.3*   ALBUMIN 2.9* 2.3* 2.3*   BILITOT 0.5 0.4 0.4   ALKPHOS 161* 133 124   AST 44* 24 22   ALT 31 21 23   ANIONGAP 8 8 8   EGFRNONAA >60.0 >60.0 >60.0       Significant Imaging: I have reviewed all pertinent imaging results/findings within the past 24 hours.

## 2019-06-22 NOTE — PROGRESS NOTES
Pharmacokinetic Assessment Follow Up: IV Vancomycin    Vancomycin serum concentration assessment(s):    The trough level of 24 drawn at 0400 on 6/21 was drawn correctly and can be used to guide therapy at this time.    Vancomycin Regimen Plan:    Change regimen to Vancomycin 750 mg IV every 24hour with next serum trough concentration measured at 0315 prior to 3rd dose on 6/25/19.    Pharmacy will continue to follow and monitor vancomycin.    Please contact pharmacy at extension [phone] for questions regarding this assessment.    Thank you for the consult,   Nicole Bhatia     Patient brief summary:  Juanita Ibarra is a 29 y.o. 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 States she can tolerate it when given with 50 mg Benadryl and ran over 3 hours    Neupogen [filgrastim] Other (See Comments)     Ostealgia after five daily doses of 300 mcg.      Bactrim [sulfamethoxazole-trimethoprim] Hives    Ceftazidime Hives     Pt stated can tolerate cefapine not ceftazidime    Ceftazidime     Dronabinol Other (See Comments)     Mental changes/hallucinations    Haldol [haloperidol lactate] Other (See Comments)     Seizure like activity    Nsaids (non-steroidal anti-inflammatory drug)      Cannot have due to lung transplant    Adhesive Rash     Cloth tape- please use tegaderm or paper tape    Aztreonam Rash    Ciprofloxacin Nausea And Vomiting     Projectile N/V, per patient.  Unwilling to retry therapy.       Actual Body Weight:   67 kg    Renal Function:   Estimated Creatinine Clearance: 103 mL/min (based on SCr of 0.7 mg/dL).,         CBC (last 72 hours):  Recent Labs   Lab Result Units 06/19/19 2000  06/20/19  0300 06/20/19  1535 06/21/19  0400 06/22/19  0534   WBC K/uL 10.43 11.22 13.16* 13.60* 7.82   Hemoglobin g/dL 8.3* 8.4* 8.9* 8.5* 7.6*   Hematocrit % 24.6* 25.0* 26.8* 26.6* 23.3*   Platelets K/uL 78* 82* 97* 113* 105*   Gran% % 92.9* 91.0* 95.1* 92.7* 89.4*   Lymph% % 3.6* 3.9* 2.3* 2.9* 6.6*   Mono% % 3.0* 4.5 1.7* 3.6* 3.5*   Eosinophil% % 0.0 0.0 0.1 0.0 0.0   Basophil% % 0.1 0.2 0.1 0.1 0.1   Differential Method  Automated Automated Automated Automated Automated       Metabolic Panel (last 72 hours):  Recent Labs   Lab Result Units 06/19/19 2000 06/20/19  0300 06/20/19  1535 06/21/19  0400 06/22/19  0422 06/22/19  0534   Sodium mmol/L 136 137 138 141 135* 138   Potassium mmol/L 4.0 4.2 4.5 4.6 3.5 3.8   Chloride mmol/L 105 104 103 104 101 103   CO2 mmol/L 23 23 28 29 26 27   Glucose mg/dL 163* 160* 152* 123* 180* 209*   BUN, Bld mg/dL 16 17 16 13 12 12   Creatinine mg/dL 0.8 0.8 0.8 0.7 0.7 0.7   Albumin g/dL 2.8* 2.8* 2.9* 2.9* 2.3* 2.3*   Total Bilirubin mg/dL 0.4 0.5 0.4 0.5 0.4 0.4   Alkaline Phosphatase U/L 140* 143* 160* 161* 133 124   AST U/L 33 30 37 44* 24 22   ALT U/L 25 25 29 31 21 23   Magnesium mg/dL 2.0 1.9 2.3 2.1 1.9 1.8   Phosphorus mg/dL 2.9 2.9 3.2  --   --   --        Vancomycin Administrations:  vancomycin given in the last 96 hours                   vancomycin 750 mg in dextrose 5 % 250 mL IVPB (ready to mix system) (mg) 750 mg New Bag 06/22/19 0343     750 mg New Bag 06/21/19 1555    vancomycin 750 mg in dextrose 5 % 250 mL IVPB (ready to mix system) (mg) 750 mg New Bag 06/20/19 1935    vancomycin (VANCOCIN) 1250 mg in 5 % dextrose 250 mL IVPB (mg) 1,250 mg New Bag 06/20/19 0540     1,250 mg New Bag 06/19/19 1647     1,250 mg New Bag  0500                Drug levels (last 3 results):  Recent Labs   Lab Result Units 06/21/19  0400   Vancomycin-Trough ug/mL 24.0*       Microbiologic Results:  Microbiology Results (last 7 days)     Procedure Component Value Units Date/Time     Culture, Respiratory with Gram Stain [[id number]]  (Susceptibility) Collected:  06/20/19 0808    Order Status:  Completed Specimen:  Respiratory from Bronchial Wash Updated:  06/22/19 1018     Respiratory Culture No S aureus isolated.     Respiratory Culture --     PSEUDOMONAS AERUGINOSA   Rare       Gram Stain (Respiratory) <10 epithelial cells per low power field.     Gram Stain (Respiratory) No WBC's     Gram Stain (Respiratory) No organisms seen    AFB Culture & Smear [[id number]] Collected:  06/20/19 0808    Order Status:  Completed Specimen:  Respiratory from Bronchial Wash Updated:  06/21/19 2127     AFB Culture & Smear Culture in progress     AFB CULTURE STAIN No acid fast bacilli seen.    Fungus culture [[id number]]     Order Status:  Canceled Specimen:  Respiratory from Bronchial Wash     Culture, Respiratory with Gram Stain [[id number]]     Order Status:  Canceled Specimen:  Respiratory from Bronchial Wash     AFB Culture & Smear [[id number]]     Order Status:  Canceled Specimen:  Respiratory from Bronchial Wash     Aerobic culture [[id number]] Collected:  06/18/19 0930    Order Status:  Completed Specimen:  Tissue from Lung, Left Updated:  06/20/19 1323     Aerobic Bacterial Culture --     CANDIDA ALBICANS  Few  No other significant isolate      Narrative:       Donor lung    Culture, Anaerobe [[id number]] Collected:  06/18/19 0930    Order Status:  Completed Specimen:  Tissue from Lung, Left Updated:  06/20/19 1040     Anaerobic Culture Culture in progress    Narrative:       Donor lung    Fungus culture [[id number]] Collected:  06/20/19 0808    Order Status:  Sent Specimen:  Respiratory from Bronchial Wash Updated:  06/20/19 0936    AFB Culture & Smear [[id number]] Collected:  06/18/19 0930    Order Status:  Completed Specimen:  Tissue from Lung, Left Updated:  06/19/19 2127     AFB Culture & Smear Culture in progress     AFB CULTURE STAIN No acid fast bacilli seen.    Narrative:       Donor lung    Urine  culture [[id number]] Collected:  06/17/19 1753    Order Status:  Completed Specimen:  Urine, Clean Catch Updated:  06/19/19 0117     Urine Culture, Routine No significant growth    Narrative:       Indicated criteria for high risk culture:->Other  Other (specify):->awaiting lung transplant    Gram stain [[id number]] Collected:  06/18/19 0930    Order Status:  Completed Specimen:  Tissue from Lung, Left Updated:  06/18/19 1041     Gram Stain Result Moderate WBC's      No organisms seen    Narrative:       Donor lung    Fungus culture [[id number]] Collected:  06/18/19 0930    Order Status:  Sent Specimen:  Tissue from Lung, Left Updated:  06/18/19 0932

## 2019-06-22 NOTE — PROGRESS NOTES
[facility]  Lung Transplant  Progress Note - Critical Care    Patient Name: Juanita Ibarra  MRN: [id number]  Admission Date: 6/17/2019  Hospital Length of Stay: 4 days  Post-Operative Day: 1836 (Lung), 4 (Lung)  Attending Physician: Francisca Morin DO  Primary Care Provider: Primary Doctor No     Subjective:     Interval History: Re-intubated yesterday afternoon due to persistently altered mental status and for airway protection. Remains intubated and sedated with Precedex, propofol, and fentanyl. Lung compliance improving this AM on rounds and Pi decreased from 22 to 18 with goal to maintain tidal volumes ~390. VBG this morning 7.45/38/26/26.9. No BM despite lactulose/Miralax administration overnight. Plan to start trickle tube feeds and suppository.     Remains on insulin infusion per endocrine. Discussion with staff anesthesiologist this morning about pain management plans once patient is ready for extubation and will plan for low dose ketamine with hydromorphone PCA. No plans for extubation today. Significant other at bedside and updated on plan of care.      Continuous Infusions:   dexmedetomidine (PRECEDEX) infusion 1.4 mcg/kg/hr (06/22/19 1200)    dextrose 5 % and 0.45 % NaCl with KCl 20 mEq 5 mL/hr at 06/22/19 1200    fentanyl 25 mL/hr at 06/22/19 1200    insulin (HUMAN R) infusion (adults) 0.6 Units/hr (06/22/19 1200)    nicardipine Stopped (06/22/19 0400)    propofol 45 mcg/kg/min (06/22/19 1300)     Scheduled Meds:   basiliximab (SIMULECT) chemo infusion  20 mg Intravenous Q96H    chlorhexidine  10 mL Mouth/Throat BID    ciprofloxacin  400 mg Intravenous Q8H    enoxaparin  40 mg Subcutaneous Daily    fluconazole (DIFLUCAN) IVPB  400 mg Intravenous Q24H    ganciclovir (CYTOVENE) IVPB  5 mg/kg Intravenous Q12H    lactulose  20 g Per OG tube TID    levalbuterol  1.25 mg Nebulization Q8H WA    mupirocin  1 g Nasal BID    mycophenolate (CELLCEPT) IVPB  500 mg  Intravenous BID    polyethylene glycol  17 g Per NG tube Daily    [START ON 6/25/2019] predniSONE  25 mg Per NG tube Daily    [START ON 6/24/2019] predniSONE  35 mg Per NG tube Daily    [START ON 6/23/2019] predniSONE  50 mg Per NG tube Daily    tacrolimus  1 mg Per NG tube BID    tobramycin (NEBCIN) IVPB (once daily administration)  10 mg/kg (Adjusted) Intravenous Once    [START ON 6/23/2019] vancomycin (VANCOCIN) IVPB  750 mg Intravenous Q24H     PRN Meds:bisacodyl, Dextrose 10% Bolus, Dextrose 10% Bolus, dextrose 50%, dextrose 50%, diphenhydrAMINE, glucagon (human recombinant), glucose, glucose, insulin aspart U-100, lactulose, levalbuterol, levalbuterol, lipase-protease-amylase (VIOKACE) 20,880-78,300- 78,300 units, magnesium sulfate IVPB, metoclopramide HCl, naloxone, ondansetron, ondansetron, oxyCODONE, oxyCODONE, potassium chloride in water **AND** potassium chloride in water **AND** potassium chloride in water, promethazine (PHENERGAN) IVPB    Review of patient's allergies indicates:   Allergen Reactions    Albuterol Palpitations    Colistin Anaphylaxis    Vancomycin analogues      Infusion reaction that does not resolve with slowing  Pt. States she can tolerate it when given with 50 mg Benadryl and ran over 3 hours    Neupogen [filgrastim] Other (See Comments)     Ostealgia after five daily doses of 300 mcg.      Bactrim [sulfamethoxazole-trimethoprim] Hives    Ceftazidime Hives     Pt stated can tolerate cefapine not ceftazidime    Ceftazidime     Dronabinol Other (See Comments)     Mental changes/hallucinations    Haldol [haloperidol lactate] Other (See Comments)     Seizure like activity    Nsaids (non-steroidal anti-inflammatory drug)      Cannot have due to lung transplant    Adhesive Rash     Cloth tape- please use tegaderm or paper tape    Aztreonam Rash    Ciprofloxacin Nausea And Vomiting     Projectile N/V, per patient.  Unwilling to retry therapy.       Review of Systems    Unable to perform ROS: Intubated     Objective:   Physical Exam   Constitutional: She appears well-developed and well-nourished. No distress. She is sedated and intubated.   HENT:   Head: Normocephalic and atraumatic.   Right Ear: External ear normal.   Left Ear: External ear normal.   Nose: Nose normal.   ETT and OGT   Eyes: Conjunctivae and EOM are normal.   Neck: Neck supple. No JVD present.   Right IJ CVC with dressing c/d/i; right subclavian port with dressing c/d/i   Cardiovascular: Normal rate and regular rhythm. Exam reveals friction rub.   Murmur heard.  Pulmonary/Chest: She is intubated. No respiratory distress. She has decreased breath sounds in the right middle field, the right lower field and the left lower field. She has no wheezes. She has rhonchi. She has rales in the right upper field and the left upper field.   Bilateral pleural and mediastinal chest tubes with sanguinous output, bilateral airleak   Abdominal: Bowel sounds are normal. She exhibits distension. There is no tenderness.   Protuberant abdomen   Genitourinary:   Genitourinary Comments: [place] to gravity   Musculoskeletal: She exhibits no edema.   Skin: Skin is warm and dry. She is not diaphoretic. There is pallor.   Psychiatric: Her mood appears anxious. Her affect is labile. She is agitated and hyperactive.   Nursing note and vitals reviewed.        Vital Signs (Most Recent):  Temp: 98.1 °F (36.7 °C) (06/22/19 1247)  Pulse: 72 (06/22/19 1300)  Resp: 20 (06/22/19 1300)  BP: (!) 154/108 (06/22/19 1300)  SpO2: 100 % (06/22/19 1300) Vital Signs (24h Range):  Temp:  [97 °F (36.1 °C)-100.9 °F (38.3 °C)] 98.1 °F (36.7 °C)  Pulse:  [] 72  Resp:  [13-39] 20  SpO2:  [94 %-100 %] 100 %  BP: (122-173)/() 154/108     Weight: 67 kg (147 lb 11.3 oz)  Body mass index is 27.91 kg/m².      Intake/Output Summary (Last 24 hours) at 6/22/2019 1333  Last data filed at 6/22/2019 1300  Gross per 24 hour   Intake 2992.05 ml   Output 3165 ml   Net  -172.95 ml       Ventilator Data:     Vent Mode: A/C  Oxygen Concentration (%):  [] 40  Resp Rate Total:  [18 br/min-27 br/min] 22 br/min  Vt Set:  [290 mL] 290 mL  PEEP/CPAP:  [3 cmH20-8 cmH20] 8 cmH20  Mean Airway Pressure:  [8.1 ccY40-59 cmH20] 13 cmH20    Hemodynamic Parameters:       Lines/Drains:       Introducer right internal jugular (Active)   Specific Qualities Other (Comment) 6/22/2019  7:15 AM   Dressing Status Biopatch in place;Clean;Dry;Intact 6/22/2019  7:15 AM   Dressing Intervention Other (Comment) 6/22/2019  7:15 AM   Dressing Change Due 06/25/19 6/22/2019  7:15 AM   Daily Line Review Performed 6/22/2019  7:15 AM   Number of days:             Port A Cath Single Lumen 06/24/14 1200 right subclavian (Active)   Accessed by: Radha Ortega RN 6/17/2019  8:00 PM   Dressing Type Transparent 6/22/2019  7:15 AM   Dressing Status Biopatch in place;Clean;Dry;Intact 6/22/2019  7:15 AM   Dressing Intervention Other (Comment) 6/22/2019  7:15 AM   Dressing Change Due 06/24/19 6/22/2019  7:15 AM   Line Status Saline locked 6/22/2019  7:15 AM   Flush Performed No 6/22/2019  7:15 AM   Date to be Reflushed 06/18/19 6/17/2019  8:00 PM   Daily Line Review Performed 6/22/2019  7:15 AM   Type of Needle Nicole 6/22/2019  3:00 AM   Gauge 20 6/22/2019  3:00 AM   Needle Length 1 in 6/22/2019  3:00 AM   Needle Status Left in place 6/22/2019  3:00 AM   Needle Insertion Date 06/17/19 6/22/2019  3:00 AM   Needle Insertion Time 1900 6/17/2019  8:00 PM   Number of days: 1824            Percutaneous Central Line Insertion/Assessment - double lumen  06/18/19 0510 (Active)   Dressing biopatch in place;dressing dry and intact 6/22/2019  7:15 AM   Securement secured w/ sutures 6/22/2019  7:15 AM   Additional Site Signs no drainage;no streak formation;no palpable cord;no pain;no edema;no warmth;no erythema 6/22/2019  7:15 AM   Distal Patency/Care infusing 6/22/2019  7:15 AM   Proximal Patency/Care infusing 6/22/2019  7:15 AM    Waveform normal 6/22/2019  3:00 AM   Line Interventions line leveled/zeroed 6/22/2019  3:00 AM   Dressing Change Due 06/28/19 6/22/2019  7:15 AM   Daily Line Review Performed 6/22/2019  7:15 AM   Number of days: 4            Percutaneous Central Line Insertion/Assessment - triple lumen  right internal jugular (Active)   Dressing biopatch in place;dressing dry and intact 6/22/2019  7:15 AM   Securement secured w/ sutures 6/22/2019  7:15 AM   Additional Site Signs no drainage;no streak formation;no palpable cord;no pain;no edema;no warmth;no erythema 6/22/2019  7:15 AM   Distal Patency/Care infusing 6/22/2019  7:15 AM   Medial Patency/Care infusing 6/22/2019  7:15 AM   Proximal Patency/Care infusing 6/22/2019  7:15 AM   Waveform normal 6/22/2019  3:00 AM   Line Interventions line leveled/zeroed 6/22/2019  3:00 AM   Dressing Change Due 06/28/19 6/22/2019  7:15 AM   Daily Line Review Performed 6/22/2019  7:15 AM   Number of days:             NG/OG Tube 06/21/19 1715 Center mouth (Active)   Placement Check placement verified by x-ray;placement verified by aspirate characteristics 6/22/2019  7:15 AM   Tolerance no signs/symptoms of discomfort 6/22/2019  7:15 AM   Securement secured to commercial device 6/22/2019  7:15 AM   Clamp Status/Tolerance unclamped 6/22/2019  7:15 AM   Suction Setting/Drainage Method suction at;low;intermittent setting 6/22/2019  7:15 AM   Insertion Site Appearance no redness, warmth, tenderness, skin breakdown, drainage 6/22/2019  7:15 AM   Drainage Brown;Clear 6/22/2019  7:15 AM   Flush/Irrigation flushed w/;water;no resistance met 6/22/2019  7:15 AM   Intake (mL) 150 mL 6/22/2019  9:00 AM   Tube Output(mL)(Include Discarded Residual) 0 mL 6/22/2019  7:00 AM   Number of days: 0            Urethral Catheter 06/18/19 0504 Non-latex;Straight-tip;Temperature probe 14 Fr. (Active)   Site Assessment Clean;Intact 6/22/2019  7:15 AM   Collection Container Urimeter 6/22/2019  7:15 AM   Securement Method  "secured to top of thigh w/ adhesive device 6/22/2019  7:15 AM   Catheter Care Performed yes 6/22/2019  7:15 AM   Reason for Continuing Urinary Catheterization Critically ill in ICU requiring intensive monitoring;Post operative 6/22/2019  7:15 AM   CAUTI Prevention Bundle StatLock in place w 1" slack;Intact seal between catheter & drainage tubing;Green sheeting clip in use;Drainage bag off the floor;No dependent loops or kinks;Drainage bag not overfilled (<2/3 full);Drainage bag below bladder 6/22/2019  7:15 AM   Output (mL) 225 mL 6/22/2019  1:00 PM   Number of days: 4            Y Chest Tube 1 and 2 06/18/19 1151 1 Right Mediastinal 19 Fr. 2 Right Pleural 19 Fr. (Active)   Function -20 cm H2O 6/22/2019  7:15 AM   Air Leak/Fluctuation air leak not present;fluctuation not present 6/22/2019  7:15 AM   Safety all tubing connections taped;2 rubber-tipped hemostats w/ patient;all connections secured;suction checked 6/22/2019  7:15 AM   Securement tubing secured to body distal to insertion site w/ tape 6/22/2019  7:15 AM   Left Subcutaneous Emphysema none present 6/22/2019  7:15 AM   Right Subcutaneous Emphysema none present 6/22/2019  7:15 AM   Patency Intervention Tip/tilt 6/22/2019  7:15 AM   Drainage Description 1 Serosanguineous 6/22/2019  7:15 AM   Tube 1 Dressing Appearance occlusive gauze dressing intact;clean and dry 6/22/2019  7:15 AM   Tube 1 Dressing Care dressing changed 6/21/2019  3:15 PM   Site Assessment 1 Not assessed;Other (Comment) 6/22/2019  7:15 AM   Surrounding Skin 1 Dry;Intact 6/22/2019  7:15 AM   Drainage Description 2 Serosanguineous 6/22/2019  7:15 AM   Tube 2 Dressing Appearance occlusive gauze dressing intact;clean and dry 6/22/2019  7:15 AM   Tube 2 Dressing Care dressing changed 6/21/2019  3:15 PM   Site Assessment 2 Not assessed;Other (Comment) 6/22/2019  7:15 AM   Surrounding Skin Unable to view 6/22/2019  7:15 AM   Output (mL) 0 mL 6/22/2019 11:00 AM   Number of days: 4            Y Chest " Tube 3 and 4 06/18/19 1151 3 Left Pleural 19 Fr. 4 Left Mediastinal 19 Fr. (Active)   Function -20 cm H2O 6/22/2019  7:15 AM   Air Leak/Fluctuation air leak present;fluctuation present;connections tightened 6/22/2019  7:15 AM   Safety all connections secured;all tubing connections taped;2 rubber-tipped hemostats w/ patient;suction checked 6/22/2019  7:15 AM   Securement tubing secured to body distal to insertion site w/ tape 6/22/2019  7:15 AM   Left Subcutaneous Emphysema none present 6/22/2019  7:15 AM   Right Subcutaneous Emphysema none present 6/22/2019  7:15 AM   Patency Intervention Tip/tilt 6/22/2019  7:15 AM   Drainage Description 3 Serosanguineous 6/22/2019  7:15 AM   Tube 3 Dressing Appearance occlusive gauze dressing intact;clean and dry 6/22/2019  7:15 AM   Tube 3 Dressing Care dressing reinforced 6/22/2019  7:15 AM   Site Assessment 3 Not assessed;Other (Comment) 6/22/2019  7:15 AM   Surrounding Skin 3 Unable to view 6/22/2019  7:15 AM   Drainage Description 4 Serosanguineous 6/22/2019  7:15 AM   Tube 4 Dressing Appearance occlusive gauze dressing intact;clean and dry 6/22/2019  7:15 AM   Tube 4 Dressing Care dressing reinforced 6/22/2019  7:15 AM   Site Assessment 4 Not assessed;Other (Comment) 6/22/2019  7:15 AM   Surrounding Skin 4 Dry;Intact 6/22/2019  3:00 AM   Output (mL) 20 mL 6/22/2019 11:00 AM   Number of days: 4       Significant Labs:  CBC:  Recent Labs   Lab 06/22/19  0534   WBC 7.82   RBC 2.76*   HGB 7.6*   HCT 23.3*   *   MCV 84   MCH 27.5   MCHC 32.6     BMP:  Recent Labs   Lab 06/22/19  0534      K 3.8      CO2 27   BUN 12   CREATININE 0.7   CALCIUM 8.1*      Tacrolimus Levels:  Recent Labs   Lab 06/22/19  0534   TACROLIMUS 2.1*     Microbiology:  Microbiology Results (last 7 days)     Procedure Component Value Units Date/Time    Culture, Respiratory with Gram Stain [[id number]]  (Susceptibility) Collected:  06/20/19 0808    Order Status:  Completed Specimen:   Respiratory from Bronchial Wash Updated:  06/22/19 1018     Respiratory Culture No S aureus isolated.     Respiratory Culture --     PSEUDOMONAS AERUGINOSA   Rare       Gram Stain (Respiratory) <10 epithelial cells per low power field.     Gram Stain (Respiratory) No WBC's     Gram Stain (Respiratory) No organisms seen    AFB Culture & Smear [[id number]] Collected:  06/20/19 0808    Order Status:  Completed Specimen:  Respiratory from Bronchial Wash Updated:  06/21/19 2127     AFB Culture & Smear Culture in progress     AFB CULTURE STAIN No acid fast bacilli seen.    Fungus culture [[id number]]     Order Status:  Canceled Specimen:  Respiratory from Bronchial Wash     Culture, Respiratory with Gram Stain [[id number]]     Order Status:  Canceled Specimen:  Respiratory from Bronchial Wash     AFB Culture & Smear [[id number]]     Order Status:  Canceled Specimen:  Respiratory from Bronchial Wash     Aerobic culture [[id number]] Collected:  06/18/19 0930    Order Status:  Completed Specimen:  Tissue from Lung, Left Updated:  06/20/19 1323     Aerobic Bacterial Culture --     CANDIDA ALBICANS  Few  No other significant isolate      Narrative:       Donor lung    Culture, Anaerobe [[id number]] Collected:  06/18/19 0930    Order Status:  Completed Specimen:  Tissue from Lung, Left Updated:  06/20/19 1040     Anaerobic Culture Culture in progress    Narrative:       Donor lung    Fungus culture [[id number]] Collected:  06/20/19 0808    Order Status:  Sent Specimen:  Respiratory from Bronchial Wash Updated:  06/20/19 0936    AFB Culture & Smear [[id number]] Collected:  06/18/19 0930    Order Status:  Completed Specimen:  Tissue from Lung, Left Updated:  06/19/19 2127     AFB Culture & Smear Culture in progress     AFB CULTURE STAIN No acid fast bacilli seen.    Narrative:       Donor lung    Urine culture [[id number]] Collected:  06/17/19 1753    Order Status:  Completed Specimen:  Urine, Clean Catch Updated:  06/19/19 0117      Urine Culture, Routine No significant growth    Narrative:       Indicated criteria for high risk culture:->Other  Other (specify):->awaiting lung transplant    Gram stain [[id number]] Collected:  06/18/19 0930    Order Status:  Completed Specimen:  Tissue from Lung, Left Updated:  06/18/19 1041     Gram Stain Result Moderate WBC's      No organisms seen    Narrative:       Donor lung    Fungus culture [[id number]] Collected:  06/18/19 0930    Order Status:  Sent Specimen:  Tissue from Lung, Left Updated:  06/18/19 0944          I have reviewed all pertinent labs within the past 24 hours.    Diagnostic Results:  Chest X-Ray: I personally reviewed the films and findings are: increased opacification in the right middle and lower lung fields    No Further        Assessment/Plan:     * S/P lung transplant  POD#4 s/p bilateral lung transplant (re-transplant) for CARLOS EDUARDO. 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 Remains PGD 0. S/p bronch and extubated to HFNC on POD#2. Re-intubated on  CTS to manage chest tubes.     - Continue lung protective ventilation. Currently on A/C PC with Pi 18, Rate 20, Ti 0.85, PEEP 8, FiO2 40%. Maintain O2 sats >88%. Continue Xopenex nebs Q6 hrs. Continue immunosuppression and ppx per protocol. Continue Daily CXR. Hold tube feeds at midnight for possible bronchoscopy tomorrow. Plan for possible vent weaning and extubation tomorrow or Monday.     Immunosuppression  Previously on tacrolimus, MMF, and daily prednisone. Received solumedrol in OR and basiliximab upon arrival to ICU. Repeat basiliximab on POD#4.   - Continue MMF, tacrolimus, and steroid taper. Monitor daily tacrolimus levels and adjust dose as needed.     Prophylactic antibiotic  - CMV D-/R+. Continue OIP with ganciclovir, fluconazole, and dapsone.   -Previously on Merrem/Vanc for routine surgical ppx based on previous sensitivities.   -Bronchial wash culture from 6/20 with  Pseudomonas. Continue vancomycin. Meropenem d/c. Plan to start  ciprofloxacin 400 mg Q8hrs and tobramycin x1.       Acute blood loss anemia  Received total of 6u PRBCs, 2u plts, 1 cryo, and 4 FFP angie-operatively. Hemodynamically stable today. Will be conservative with future transfusions.     Thrombocytopenia, unspecified  - Expected post-operatively. Continue to monitor on serial CBC.    Adrenal cortical steroids causing adverse effect in therapeutic use  Endocrinology consulted, appreciate recs.    CF related Pancreatic insufficiency  - Begin Viokace enzymes with trickle tube feedings today. Hold VioKace enzyme supplementation when tube feeds are held.    Chronic pain with opiate use  - Anesthesia consulted for pain management. Continue aggressive bowel regimen.   - Previously on ketamine 25 infusion. Discussed with anesthesia staff today. Will try to transition to ketamine 5 drip and hydromorphone PCA once ready to extubate.   - Continue Q12 Bupivacaine in TARA catheters.       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 Meropenem d/c. Ciprofloxacin 400 mg Q8hrs started. Tobramycin x1 today.      Steroid-induced hyperglycemia  - Endocrine consulted.  Currently on Insulin gtt.  Goal BG <160          Preventive Measures: Nutrition: Goal: trickle tube feeds, DVT: initiate prophyllaxis    Counseling/Consultation:I discussed the case with Dr. Morin., I discussed the patient's condition/prognosis with the family.        Jacqueline Dumont PA-C  Lung Transplant  [facility]

## 2019-06-22 NOTE — ASSESSMENT & PLAN NOTE
Received total of 6u PRBCs, 2u plts, 1 cryo, and 4 FFP angie-operatively. Hemodynamically stable today. Will be conservative with future transfusions.

## 2019-06-22 NOTE — ANESTHESIA POST-OP PAIN MANAGEMENT
Acute Pain Service Progress Note    Juanita Ibarra is a 29 y.o., female, [id number].    Surgery:  REDO BILATERAL LUNG TRANSPLANT    Post Op Day #: 4    Catheter type: perineural  Bilateral TARA    Hand bolus: 60cc of 0.375% Bupivacaine with decadron and clonidine    Problem List:    Active Hospital Problems    Diagnosis  POA    *S/P lung transplant [Z94.2]  Not Applicable    Acute hyperactive delirium due to another medical condition [F05]  Unknown    Awaiting organ transplant [Z76.82]  Not Applicable    Prophylactic immunotherapy [Z29.8]  Not Applicable    Acute blood loss anemia [D62]  No    Thrombocytopenia, unspecified [D69.6]  No    Steroid-induced hyperglycemia [R73.9, T38.0X5A]  Yes    Prophylactic antibiotic [Z79.2]  Not Applicable    Immunosuppression [D89.9]  Yes    Hyperglycemia [R73.9]  Yes    Adrenal cortical steroids causing adverse effect in therapeutic use [T38.0X5A]  Unknown    CF related Pancreatic insufficiency [K86.89]  Yes    Chronic pain with opiate use [G89.29]  Yes      Resolved Hospital Problems    Diagnosis Date Resolved POA    Awaiting organ transplant [Z76.82] 06/18/2019 Not Applicable    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    Objective:     Catheter site clean, dry, intact      Vitals   Vitals:    06/22/19 1247   BP:    Pulse: 72   Resp: 16   Temp: 36.7 °C (98.1 °F)        Labs    Admission on 06/17/2019   No results displayed because visit has over 200 results.           Meds   Current Facility-Administered Medications   Medication Dose Route Frequency Provider Last Rate Last Dose    basiliximab (SIMULECT) 20 mg in dextrose 5 % 50 mL chemo infusion  20 mg Intravenous Q96H Jacqueline Douglass  mL/hr at 06/18/19 1647 20 mg at 06/18/19 1647    bisacodyl suppository 10 mg  10 mg Rectal Daily PRN Shama Canales PA-C    10 mg at 06/22/19 0920    chlorhexidine 0.12 % solution 10 mL  10 mL Mouth/Throat BID Jacqueline Douglass MD   10 mL at 06/22/19 0825    dexmedetomidine (PRECEDEX) 400mcg/100mL 0.9% NaCL infusion  0.2 mcg/kg/hr Intravenous Continuous Shama Canales PA-C 22.5 mL/hr at 06/22/19 1200 1.4 mcg/kg/hr at 06/22/19 1200    dextrose 10% (D10W) Bolus  12.5 g Intravenous PRN Jonathan Newby MD 1,000 mL/hr at 06/19/19 0232 125 mL at 06/19/19 0232    dextrose 10% (D10W) Bolus  25 g Intravenous PRN Jonathan Newby MD        dextrose 5 % and 0.45 % NaCl with KCl 20 mEq infusion   Intravenous Continuous Jacqueline Douglass MD 5 mL/hr at 06/22/19 1200      dextrose 50% injection 12.5 g  12.5 g Intravenous PRN Amy Zapata NP        dextrose 50% injection 25 g  25 g Intravenous PRN Amy Zapata NP        diphenhydrAMINE injection 50 mg  50 mg Intravenous On Call Procedure Francisca Morin, DO   50 mg at 06/22/19 0317    enoxaparin injection 40 mg  40 mg Subcutaneous Daily Jacqueline Dumont PA-C        fentaNYL 2500 mcg in 0.9% sodium chloride 250 mL infusion premix (titrating)   Intravenous Continuous Harleen Riley MD 25 mL/hr at 06/22/19 1200      fluconazole (DIFLUCAN) IVPB 400 mg 200 mL  400 mg Intravenous Q24H Shama Canales PA-C 100 mL/hr at 06/22/19 0900 400 mg at 06/22/19 0900    ganciclovir (CYTOVENE) 295 mg in sodium chloride 0.9% 100 mL IVPB  5 mg/kg Intravenous Q12H Jacqueline Douglass  mL/hr at 06/22/19 0910 295 mg at 06/22/19 0910    glucagon (human recombinant) injection 1 mg  1 mg Intramuscular PRN Amy Zapata NP        glucose chewable tablet 16 g  16 g Oral PRN Amy Zapata NP        glucose chewable tablet 24 g  24 g Oral PRN Amy Zapata NP        insulin aspart U-100 pen 0-4 Units  0-4 Units Subcutaneous PRN Amy Zapata NP   1 Units at 06/22/19 1200    insulin regular (Humulin R) 100 Units in sodium chloride 0.9% 100 mL infusion  0.6 Units/hr  Intravenous Continuous Amy Zapata NP 0.6 mL/hr at 06/22/19 1200 0.6 Units/hr at 06/22/19 1200    lactulose 20 gram/30 mL solution Soln 20 g  20 g Oral Q6H PRN Angelica Gomez MD        lactulose 20 gram/30 mL solution Soln 20 g  20 g Per OG tube TID Jacqueline Dumont PA-C   20 g at 06/22/19 0915    levalbuterol nebulizer solution 1.25 mg  1.25 mg Nebulization Q4H PRN Jacqueline Dumont PA-C   1.25 mg at 06/20/19 2023    levalbuterol nebulizer solution 1.25 mg  1.25 mg Nebulization Q8H WA Shama Canales PA-C   1.25 mg at 06/22/19 0803    levalbuterol nebulizer solution 1.25 mg  1.25 mg Nebulization Q6H PRN Jacqueline Dumont PA-C        magnesium sulfate 2g in water 50mL IVPB (premix)  2 g Intravenous PRN Angelica Gomez MD   2 g at 06/22/19 0650    metoclopramide HCl injection 5 mg  5 mg Intravenous Q6H PRN Jacqueline Douglass MD        mupirocin 2 % ointment 1 g  1 g Nasal BID Jacqueline Douglass MD   1 g at 06/22/19 0915    mycophenolate (CELLCEPT) 500 mg in dextrose 5 % 100 mL IVPB  500 mg Intravenous BID Shama Canales PA-C 50 mL/hr at 06/22/19 0900 500 mg at 06/22/19 0900    naloxone 0.4 mg/mL injection 0.02 mg  0.02 mg Intravenous PRN Riley Pennington MD        niCARdipine 40 mg/200 mL infusion  1 mg/hr Intravenous Continuous Harleen Riley MD   Stopped at 06/22/19 0400    ondansetron disintegrating tablet 8 mg  8 mg Oral Q8H PRN Jacqueline Douglass MD        ondansetron injection 4 mg  4 mg Intravenous Q6H PRN Shama Canales PA-C   Stopped at 06/20/19 1910    oxyCODONE immediate release tablet 10 mg  10 mg Oral Q4H PRN Jacqueline Douglass MD        oxyCODONE immediate release tablet 5 mg  5 mg Oral Q4H PRN Jacqueline Douglass MD        polyethylene glycol packet 17 g  17 g Per NG tube Daily Jacqueline Dumont PA-C   17 g at 06/22/19 0920    potassium chloride 20 mEq in 100 mL IVPB (FOR CENTRAL LINE ADMINISTRATION ONLY)  20 mEq Intravenous PRN  Jacqueline Douglass MD 50 mL/hr at 06/22/19 0647 20 mEq at 06/22/19 0647    And    potassium chloride 40 mEq in 100 mL IVPB (FOR CENTRAL LINE ADMINISTRATION ONLY)  40 mEq Intravenous PRN Jacqueline Douglass MD 25 mL/hr at 06/18/19 1749 40 mEq at 06/18/19 1749    And    potassium chloride 20 mEq in 100 mL IVPB (FOR CENTRAL LINE ADMINISTRATION ONLY)  60 mEq Intravenous PRN Jacqueline Douglass MD        [START ON 6/25/2019] predniSONE tablet 25 mg  25 mg Per NG tube Daily Francisca Morin DO        [START ON 6/24/2019] predniSONE tablet 35 mg  35 mg Per NG tube Daily Francisca Morin DO        [START ON 6/23/2019] predniSONE tablet 50 mg  50 mg Per NG tube Daily Francisca Morin DO        promethazine (PHENERGAN) 6.25 mg in dextrose 5 % 50 mL IVPB  6.25 mg Intravenous Q6H PRN Chai Glass MD        propofol (DIPRIVAN) 10 mg/mL infusion  15 mcg/kg/min Intravenous Continuous Jacqueline Dumont PA-C 18.7 mL/hr at 06/22/19 1210 45 mcg/kg/min at 06/22/19 1210    tacrolimus (PROGRAF) 1 mg/mL oral syringe  1 mg Per NG tube BID Jacqueline Dumont PA-C   1 mg at 06/22/19 0900    [START ON 6/23/2019] vancomycin 750 mg in dextrose 5 % 250 mL IVPB (ready to mix system)  750 mg Intravenous Q24H Francisca Morin DO            Anticoagulant dose: None    Assessment:     Pain control inadequate    Plan:  Patient now s/p bilateral lung transplant on 06/18/19; Hx of significant opioid use post previous lung transplant approximately five years ago.   - Patient with significant opioid requirement prior to surgery from previous lung transplant  - Remained intubated s/p lung transplant, requiring fentanyl gtt at 250 mcg/hr with Precedex and Propofol gtt for sedation  - S/P bilateral TARA catheter placement 06/19/19 w/ hand boluses Q12H at 0700 and 1900  - Patient placed on ketamine infusion at 10 mcg/kg/min, fentanyl weaned off  - Patient extubated and re-intubated for respiratory distress. Ketamine  discontinued.    - Plan to continue bilateral TARA hand bolus until extubation. Will need to coordinate with transplant team prior to extubation with plan to restart ketamine infusion at 5 mcg/kg/min and low dose dilaudid PCA (0.2mg).    Evaluator Everton Ospina        I have reviewed and concur with the resident's history, physical, assessment, and plan.  I have personally interviewed and examined the patient at bedside.  See below addendum for my evaluation and additional findings.    Patient currently comfortable on current regimen and intubated on precedex, propofol, and fentanyl.  Primary team requesting to continue this along with hand boluses of TARA catheters and prior to extubating, we will restart ketamine and hydromorphone PCA for pain control.

## 2019-06-22 NOTE — PLAN OF CARE
Problem: Adult Inpatient Plan of Care  Goal: Plan of Care Review  Recommendations:  1.) Begin Impact Peptide 1.5 @ 10mL/hr advancing per MD to goal rate 35mL/hr to provide 1260 kcals, 79gm, 647mL of free water. If GRV >500mL, hold TF q4h then restart regimen. TF + propofol will meet 104% EEN and 81% EPN.   2.) RD to continue following per protocol.      Basilia Asencio RD, LDN

## 2019-06-22 NOTE — ASSESSMENT & PLAN NOTE
POD#4 s/p bilateral lung transplant (re-transplant) for CARLOS EDUARDO. 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 Remains PGD 0. S/p bronch and extubated to HFNC on POD#2. Re-intubated on  CTS to manage chest tubes.     - Continue lung protective ventilation. Currently on A/C PC with Pi 18, Rate 20, Ti 0.85, PEEP 8, FiO2 40%. Maintain O2 sats >88%. Continue Xopenex nebs Q6 hrs. Continue immunosuppression and ppx per protocol. Continue Daily CXR. Hold tube feeds at midnight for possible bronchoscopy tomorrow. Plan for possible vent weaning and extubation tomorrow or Monday.

## 2019-06-22 NOTE — SUBJECTIVE & OBJECTIVE
Interval History:100.4 yesterday, PsA PCR from BAL +, GNR growing, reinutbated for hypoxic respiratory failure    Review of Systems   Unable to perform ROS: Intubated     Objective:     Vital Signs (Most Recent):  Temp: 97.7 °F (36.5 °C) (06/22/19 0803)  Pulse: 75 (06/22/19 0803)  Resp: 20 (06/22/19 0803)  BP: (!) 131/91 (06/22/19 0800)  SpO2: 99 % (06/22/19 0803) Vital Signs (24h Range):  Temp:  [97.3 °F (36.3 °C)-100.9 °F (38.3 °C)] 97.7 °F (36.5 °C)  Pulse:  [] 75  Resp:  [16-53] 20  SpO2:  [94 %-100 %] 99 %  BP: (122-173)/() 131/91     Weight: 67 kg (147 lb 11.3 oz)  Body mass index is 27.91 kg/m².    Estimated Creatinine Clearance: 103 mL/min (based on SCr of 0.7 mg/dL).    Physical Exam   Constitutional: She appears well-developed and well-nourished.   HENT:   Head: Normocephalic and atraumatic.   intubated   Eyes: Pupils are equal, round, and reactive to light.   Neck: Normal range of motion. Neck supple.   Cardiovascular: Normal rate.   Pulmonary/Chest: Effort normal and breath sounds normal.   Abdominal: Soft. Bowel sounds are normal.   Musculoskeletal: She exhibits no edema or tenderness.   Neurological:   sedated   Skin: Skin is warm and dry.   Op dressing chest wall   Psychiatric: She has a normal mood and affect.       Significant Labs:   CBC:   Recent Labs   Lab 06/20/19  1535 06/21/19  0400 06/22/19  0534   WBC 13.16* 13.60* 7.82   HGB 8.9* 8.5* 7.6*   HCT 26.8* 26.6* 23.3*   PLT 97* 113* 105*     CMP:   Recent Labs   Lab 06/21/19  0400 06/22/19  0422 06/22/19  0534    135* 138   K 4.6 3.5 3.8    101 103   CO2 29 26 27   * 180* 209*   BUN 13 12 12   CREATININE 0.7 0.7 0.7   CALCIUM 8.4* 8.3* 8.1*   PROT 6.1 5.4* 5.3*   ALBUMIN 2.9* 2.3* 2.3*   BILITOT 0.5 0.4 0.4   ALKPHOS 161* 133 124   AST 44* 24 22   ALT 31 21 23   ANIONGAP 8 8 8   EGFRNONAA >60.0 >60.0 >60.0       Significant Imaging: I have reviewed all pertinent imaging results/findings within the past 24 hours.

## 2019-06-22 NOTE — SUBJECTIVE & OBJECTIVE
"Interval HPI:   Overnight events: Remains in ICU. Re-intubated yesterday. BG above goal on insulin infusion at 0.4 u/hr with correction scale. Prednisone 70 mg; steroid taper per primary.   Eating:   NPO  Nausea: No  Hypoglycemia and intervention: No  Fever: No  TPN and/or TF: No    BP (!) 172/100   Pulse 80   Temp 97.7 °F (36.5 °C) (Core Bladder)   Resp (!) 39   Ht 5' 1" (1.549 m)   Wt 67 kg (147 lb 11.3 oz)   LMP 10/02/2016   SpO2 99%   Breastfeeding? No   BMI 27.91 kg/m²     Labs Reviewed and Include    Recent Labs   Lab 06/22/19  0534   *   CALCIUM 8.1*   ALBUMIN 2.3*   PROT 5.3*      K 3.8   CO2 27      BUN 12   CREATININE 0.7   ALKPHOS 124   ALT 23   AST 22   BILITOT 0.4     Lab Results   Component Value Date    WBC 7.82 06/22/2019    HGB 7.6 (L) 06/22/2019    HCT 23.3 (L) 06/22/2019    MCV 84 06/22/2019     (L) 06/22/2019     No results for input(s): TSH, FREET4 in the last 168 hours.  Lab Results   Component Value Date    HGBA1C 4.7 01/09/2019       Nutritional status:   Body mass index is 27.91 kg/m².  Lab Results   Component Value Date    ALBUMIN 2.3 (L) 06/22/2019    ALBUMIN 2.3 (L) 06/22/2019    ALBUMIN 2.9 (L) 06/21/2019     Lab Results   Component Value Date    PREALBUMIN 15 (L) 05/29/2014    PREALBUMIN 11 (L) 05/09/2014    PREALBUMIN 18 (L) 03/19/2014       Estimated Creatinine Clearance: 103 mL/min (based on SCr of 0.7 mg/dL).    Accu-Checks  Recent Labs     06/20/19  1400 06/20/19  1534 06/20/19  1944 06/21/19  0008 06/21/19  0354 06/21/19  0808 06/21/19  1145 06/21/19  1550 06/22/19  0001 06/22/19  0408   POCTGLUCOSE 139* 145* 123* 208* 148* 140* 197* 176* 256* 211*       Current Medications and/or Treatments Impacting Glycemic Control  Immunotherapy:    Immunosuppressants         Stop Route Frequency     tacrolimus (PROGRAF) 1 mg/mL oral syringe      -- PER NG TUBE 2 times daily     mycophenolate (CELLCEPT) 500 mg in dextrose 5 % 100 mL IVPB      -- IV 2 times " daily     basiliximab (SIMULECT) 20 mg in dextrose 5 % 50 mL chemo infusion      06/26 1514 IV Every 96 hours        Steroids:   Hormones (From admission, onward)    Start     Stop Route Frequency Ordered    06/25/19 0900  predniSONE tablet 25 mg      -- PER NG TUBE Daily 06/21/19 1743    06/24/19 0900  predniSONE tablet 35 mg      06/25 0859 PER NG TUBE Daily 06/21/19 1743    06/23/19 0900  predniSONE tablet 50 mg      06/24 0859 PER NG TUBE Daily 06/21/19 1743    06/22/19 0900  predniSONE tablet 70 mg      06/23 0859 PER NG TUBE Daily 06/21/19 1743        Pressors:    Autonomic Drugs (From admission, onward)    None        Hyperglycemia/Diabetes Medications:   Antihyperglycemics (From admission, onward)    Start     Stop Route Frequency Ordered    06/20/19 1545  insulin regular (Humulin R) 100 Units in sodium chloride 0.9% 100 mL infusion      -- IV Continuous 06/20/19 1442    06/20/19 1542  insulin aspart U-100 pen 0-4 Units      -- SubQ As needed (PRN) 06/20/19 1442

## 2019-06-23 NOTE — ASSESSMENT & PLAN NOTE
Previously on tacrolimus, MMF, and daily prednisone. Received solumedrol in OR and basiliximab upon arrival to ICU. Repeat basiliximab on POD#4.   - Continue MMF, tacrolimus, and steroid taper. Tacrolimus increased to 1.5 mg BID today. Monitor daily tacrolimus levels and adjust dose as needed.

## 2019-06-23 NOTE — PROGRESS NOTES
Dr. Morin and FELICIA Way at bedside for rounds. MD aware of VS, gtts, CXR, labs and overall pt status. MD states plan is to bronch tomorrow morning and then possible extubation. Verbal order given to restart tube feeds, maintain at 20cc/hr and stop at midnight. Viokace to be given q3 while receiving tube feeds. Pt awake and alert and writing to communicate. Pt updated on plan of care by MD and RN. All questions encouraged and answered.

## 2019-06-23 NOTE — PLAN OF CARE
Problem: Adult Inpatient Plan of Care  Goal: Plan of Care Review  POC reviewed with pt and spouse at bedside, VSS, sats 100%, SvO2 64 this am, follows commands and writes needs and questions,remains intubated on A/C PC 40% and PEEP8, xray obtained this am, CTs in place with minimal serosang drainage, to suction. Abd taut, lactulose and dulcolax suppository given per order, no BM this shift. UOP >100 mL/hr, CVP 6. Propofol and Precedex gtt infusing for sedation, Fentanyl gtt infusing for pain @ 300 mcg/hr. Pt turned and repositioned q2h, no skin breakdown noted. All questions answered and addressed, spouse verbalized understanding and compliance.

## 2019-06-23 NOTE — ASSESSMENT & PLAN NOTE
- Anesthesia consulted for pain management. Continue aggressive bowel regimen.   - Previously on ketamine 25 infusion. Discussed with anesthesia staff.. Will try to transition to ketamine 5 drip and hydromorphone PCA once ready to extubate.   - Continue Q12 Bupivacaine in TARA catheters.

## 2019-06-23 NOTE — ANESTHESIA POST-OP PAIN MANAGEMENT
Bilateral TARA catheters bolused after negative aspiration.  30mL of 0.375% ropivacaine with 1:300k epi, 50mcg clonidine, and 1mg dexamethasone per side for total of 60mL.

## 2019-06-23 NOTE — PROGRESS NOTES
1615-H. DYLON Dumont notified of pt with acute hypoxemia 88-92%. Vt in mid 400s, RR low 20s, work of breathing easy, bilateral breath sounds auscultated, no secretions able to be suctioned from ETT. PRN breathing treatment and CPT given. PA-C at bedside. STAT CXR ordered.     1640-hypoxemic to 85%. Fi02 increased to 50% with appropriate increase in sp02. ROXY Dumont PA-C at bedside. Dr. Morin en route for bronchoscopy. Consent obtained and verified.

## 2019-06-23 NOTE — ASSESSMENT & PLAN NOTE
29-year-old female with history of CF s/p bilateral sequential lung transplant 6/2014 c/b CARLOS EDUARDO and end stage lung disease, admitted 6/18/2019 for bilateral lung re-transplant (CMV D-/R+, basiliximab induction, on tacro/MMF/pred).  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  Patient with history of MRSA and Pseudomonas pneumonia - ID consulted for antibiotic recommendations. Now with PsA PCR + from BAL and PsA growing and is resistant to meropenem     Recommendations:  - consider stopping vancomycin,stop  meropenem IV and started ciprofloxacin - recommend 2 week course  - Prophylaxis per lung transplant protocol - on dapsone, fluconazole, ganciclovir  - will sign off for now call back if questions or new infectious issues arise

## 2019-06-23 NOTE — ASSESSMENT & PLAN NOTE
- CMV D-/R+. Continue OIP with ganciclovir, fluconazole, and dapsone.   -Previously on Merrem/Vanc for routine surgical ppx based on previous sensitivities.   -Bronchial wash culture from 6/20 with  Pseudomonas. Continue vancomycin. Meropenem d/c. Tobramycin x1 on 6/22. Continue ciprofloxacin 400 mg Q8hrs. ID following, appreciate recs.

## 2019-06-23 NOTE — ASSESSMENT & PLAN NOTE
Pseudomonas from 6/20 bronchial wash. Meropenem d/c. Ciprofloxacin 400 mg Q8hrs started. Tobramycin x1 on 6/22. ID following, appreciate recs.

## 2019-06-23 NOTE — SUBJECTIVE & OBJECTIVE
Interval History:much improved remains intubated but interactive.  PsA R to meropenem changed to ciprofloxacin    Review of Systems   Unable to perform ROS: Intubated     Objective:     Vital Signs (Most Recent):  Temp: 96.8 °F (36 °C) (06/23/19 1314)  Pulse: 69 (06/23/19 1314)  Resp: 20 (06/23/19 1314)  BP: (!) 145/101 (06/23/19 1314)  SpO2: 100 % (06/23/19 1314) Vital Signs (24h Range):  Temp:  [95.5 °F (35.3 °C)-98.8 °F (37.1 °C)] 96.8 °F (36 °C)  Pulse:  [64-83] 69  Resp:  [10-34] 20  SpO2:  [98 %-100 %] 100 %  BP: (114-173)/() 145/101     Weight: 65 kg (143 lb 4.8 oz)  Body mass index is 27.08 kg/m².    Estimated Creatinine Clearance: 101.5 mL/min (based on SCr of 0.7 mg/dL).    Physical Exam   Constitutional: She appears well-developed and well-nourished.   HENT:   Head: Normocephalic and atraumatic.   intubated   Eyes: Pupils are equal, round, and reactive to light.   Neck: Normal range of motion. Neck supple.   Cardiovascular: Normal rate.   Pulmonary/Chest: Effort normal and breath sounds normal.   Abdominal: Soft. Bowel sounds are normal.   Musculoskeletal: She exhibits no edema or tenderness.   Neurological: She is alert.   Skin: Skin is warm and dry.   Op dressing chest wall   Psychiatric: She has a normal mood and affect.       Significant Labs:   CBC:   Recent Labs   Lab 06/22/19  0534 06/23/19  0400   WBC 7.82 8.13   HGB 7.6* 7.3*   HCT 23.3* 23.1*   * 115*     CMP:   Recent Labs   Lab 06/22/19  0422 06/22/19  0534 06/23/19  0400   * 138 136   K 3.5 3.8 4.0    103 103   CO2 26 27 25   * 209* 235*   BUN 12 12 14   CREATININE 0.7 0.7 0.7   CALCIUM 8.3* 8.1* 7.8*   PROT 5.4* 5.3* 5.7*   ALBUMIN 2.3* 2.3* 2.2*   BILITOT 0.4 0.4 0.3   ALKPHOS 133 124 134   AST 24 22 36   ALT 21 23 27   ANIONGAP 8 8 8   EGFRNONAA >60.0 >60.0 >60.0       Significant Imaging: I have reviewed all pertinent imaging results/findings within the past 24 hours.

## 2019-06-23 NOTE — ANESTHESIA POST-OP PAIN MANAGEMENT
Acute Pain Service Progress Note    Juanita Ibarra is a 29 y.o., female, [id number].    Surgery:  REDO BILATERAL LUNG TRANSPLANT    Post Op Day #: 5    Catheter type: perineural  Bilateral TARA placed 6/19/2019    Hand bolus:q12H 30mL of 0.375% Bupivacaine with 1:300k epi, 1mg preservative free dexamethasone, and 50mcg clonidine per side      Problem List:    Active Hospital Problems    Diagnosis  POA    *S/P lung transplant [Z94.2]  Not Applicable    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 [A49.8, Z16.19]  Unknown    Acute hyperactive delirium due to another medical condition [F05]  Unknown    Prophylactic immunotherapy [Z29.8]  Not Applicable    Acute blood loss anemia [D62]  No    Thrombocytopenia, unspecified [D69.6]  No    Steroid-induced hyperglycemia [R73.9, T38.0X5A]  Yes    Prophylactic antibiotic [Z79.2]  Not Applicable    Immunosuppression [D89.9]  Yes    Hyperglycemia [R73.9]  Yes    Adrenal cortical steroids causing adverse effect in therapeutic use [T38.0X5A]  Unknown    CF related Pancreatic insufficiency [K86.89]  Yes    Chronic pain with opiate use [G89.29]  Yes      Resolved Hospital Problems    Diagnosis Date Resolved POA    Awaiting organ transplant [Z76.82] 06/22/2019 Not Applicable    Awaiting organ transplant [Z76.82] 06/18/2019 Not Applicable    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      Vitals   Vitals:    06/23/19 1230   BP: (!) 157/106   Pulse: 65   Resp: 20   Temp:         Labs    Admission on 06/17/2019   No results displayed because visit has over 200 results.           Meds   Current Facility-Administered Medications   Medication Dose Route Frequency Provider Last Rate Last Dose    bisacodyl suppository 10 mg  10 mg Rectal Daily PRN Shama PLATT  DYLON Canales   10 mg at 06/23/19 1000    ciprofloxacin (CIPRO)400mg/200ml D5W IVPB 400 mg  400 mg Intravenous Q8H Jacqueline Dumont PA-C 200 mL/hr at 06/23/19 0545 400 mg at 06/23/19 0545    dexmedetomidine (PRECEDEX) 400mcg/100mL 0.9% NaCL infusion  0.2 mcg/kg/hr Intravenous Continuous Shama Canales PA-C 22.5 mL/hr at 06/23/19 1200 1.4 mcg/kg/hr at 06/23/19 1200    dextrose 10% (D10W) Bolus  12.5 g Intravenous PRN Jonathan Newby MD 1,000 mL/hr at 06/19/19 0232 125 mL at 06/19/19 0232    dextrose 10% (D10W) Bolus  25 g Intravenous PRN Jonathan Newby MD        dextrose 5 % and 0.45 % NaCl with KCl 20 mEq infusion   Intravenous Continuous Jacqueline Douglass MD 5 mL/hr at 06/23/19 1200      dextrose 50% injection 12.5 g  12.5 g Intravenous PRN Amy Zapata, NP        dextrose 50% injection 25 g  25 g Intravenous PRN Amy Zapata, KUMAR        diphenhydrAMINE injection 50 mg  50 mg Intravenous On Call Procedure Francisca Morin, DO   50 mg at 06/22/19 2354    enoxaparin injection 40 mg  40 mg Subcutaneous Daily Jacqueline Dumont PA-C   40 mg at 06/22/19 1610    fentaNYL 2500 mcg in 0.9% sodium chloride 250 mL infusion premix (titrating)   Intravenous Continuous Harleen Riley MD 30 mL/hr at 06/23/19 1200 300 mcg/hr at 06/23/19 1200    fluconazole (DIFLUCAN) IVPB 400 mg 200 mL  400 mg Intravenous Q24H Shama Canales PA-C 100 mL/hr at 06/23/19 0755 400 mg at 06/23/19 0755    ganciclovir (CYTOVENE) 295 mg in sodium chloride 0.9% 100 mL IVPB  5 mg/kg Intravenous Q12H Jacqueline Douglass  mL/hr at 06/23/19 1000 295 mg at 06/23/19 1000    glucagon (human recombinant) injection 1 mg  1 mg Intramuscular PRN Amy Zapata, NP        glucose chewable tablet 16 g  16 g Oral PRN Amy Zapata, NP        glucose chewable tablet 24 g  24 g Oral PRN Amy Zapata, NP        insulin aspart U-100 pen 0-4 Units  0-4 Units Subcutaneous PRN Amy Zapata, NP   1 Units at  06/23/19 1215    insulin regular (Humulin R) 100 Units in sodium chloride 0.9% 100 mL infusion  1 Units/hr Intravenous Continuous Amy Zapata NP 1 mL/hr at 06/23/19 1200 1 Units/hr at 06/23/19 1200    lactulose 20 gram/30 mL solution Soln 20 g  20 g Oral Q6H PRN Angelica Gomez MD        levalbuterol nebulizer solution 1.25 mg  1.25 mg Nebulization Q4H PRN Jacqueline Dumont PA-C   1.25 mg at 06/20/19 2023    levalbuterol nebulizer solution 1.25 mg  1.25 mg Nebulization Q6H PRN Jacqueline Dumont PA-C        levalbuterol nebulizer solution 1.25 mg  1.25 mg Nebulization Q6H Jacqueline Dumont PA-C   1.25 mg at 06/23/19 0733    lipase-protease-amylase (VIOKACE) 20,880-78,300- 78,300 units per tablet 1 tablet  1 tablet Oral Q3H PRN Francisca Morin DO   1 tablet at 06/23/19 1220    magnesium sulfate 2g in water 50mL IVPB (premix)  2 g Intravenous PRN Angelica Gomez MD   2 g at 06/22/19 0650    metoclopramide HCl injection 5 mg  5 mg Intravenous Q6H PRN Jacqueline Douglass MD        mycophenolate (CELLCEPT) 500 mg in dextrose 5 % 100 mL IVPB  500 mg Intravenous BID Shama Canales PA-C 50 mL/hr at 06/23/19 0755 500 mg at 06/23/19 0755    naloxone 0.4 mg/mL injection 0.02 mg  0.02 mg Intravenous PRN Riley Pennington MD        niCARdipine 40 mg/200 mL infusion  1 mg/hr Intravenous Continuous Harleen Riley MD   Stopped at 06/22/19 1615    ondansetron disintegrating tablet 8 mg  8 mg Oral Q8H PRN Jacqueline Douglass MD        ondansetron injection 4 mg  4 mg Intravenous Q6H PRN Shama Canales PA-C   Stopped at 06/20/19 1910    oxyCODONE immediate release tablet 10 mg  10 mg Oral Q4H PRN Jacqueline Douglass MD        oxyCODONE immediate release tablet 5 mg  5 mg Oral Q4H PRN Jacqueline Douglass MD        polyethylene glycol packet 17 g  17 g Per NG tube Daily Jacqueline Dumont PA-C   17 g at 06/23/19 0805    potassium chloride 20 mEq in 100 mL IVPB (FOR  CENTRAL LINE ADMINISTRATION ONLY)  20 mEq Intravenous PRN Jacqueline Douglass MD 50 mL/hr at 06/22/19 0647 20 mEq at 06/22/19 0647    And    potassium chloride 40 mEq in 100 mL IVPB (FOR CENTRAL LINE ADMINISTRATION ONLY)  40 mEq Intravenous PRN Jacqueline Douglass MD 25 mL/hr at 06/18/19 1749 40 mEq at 06/18/19 1749    And    potassium chloride 20 mEq in 100 mL IVPB (FOR CENTRAL LINE ADMINISTRATION ONLY)  60 mEq Intravenous PRN Jacqueline Douglass MD        [START ON 6/25/2019] predniSONE tablet 25 mg  25 mg Per NG tube Daily Francisca Morin DO        [START ON 6/24/2019] predniSONE tablet 35 mg  35 mg Per NG tube Daily Francisca Morin DO        promethazine (PHENERGAN) 6.25 mg in dextrose 5 % 50 mL IVPB  6.25 mg Intravenous Q6H PRN Chai Glass MD        propofol (DIPRIVAN) 10 mg/mL infusion  15 mcg/kg/min Intravenous Continuous Jacqueline Dumont PA-C 20.8 mL/hr at 06/23/19 1200 50 mcg/kg/min at 06/23/19 1200    tacrolimus (PROGRAF) 1 mg/mL oral syringe  1.5 mg Per NG tube BID Jacqueline Dumont PA-C        vancomycin 750 mg in dextrose 5 % 250 mL IVPB (ready to mix system)  750 mg Intravenous Q12H Francisca Morin .7 mL/hr at 06/23/19 0322 750 mg at 06/23/19 0322        Anticoagulant dose: none.    Assessment:     Pain control inadequate    Plan:    Patient now s/p bilateral lung transplant on 06/18/19; Hx of significant opioid use post previous lung transplant approximately five years ago.   - Patient with significant opioid requirement prior to surgery from previous lung transplant  - Remained intubated s/p lung transplant, requiring fentanyl with dexmedetomidine at 1.4mcg/kg/hr and propofol 50mcg/kg/min for sedation  - S/P bilateral TARA catheter placement 06/19/19 w/ hand boluses Q12H at 0700 and 1900  - Patient placed on ketamine infusion on 6/20 at 10 mcg/kg/min, fentanyl and propofol had been weaned off then  - Patient extubated on 6/21 but re-intubated for respiratory  distress, agitation, delirium. Ketamine discontinued.  - Fentanyl drip at 300mcg/hr on 6/23, up from 250 on 6/22.  - Plan to continue bilateral TARA hand bolus until extubation. Will need to coordinate with transplant team prior to extubation with plan to restart ketamine infusion at 5 mcg/kg/min and low dose dilaudid PCA (0.2mg).       Evaluator Twna Alvarez

## 2019-06-23 NOTE — ASSESSMENT & PLAN NOTE
POD#5 s/p bilateral lung transplant (re-transplant) for CARLOS EDUARDO. 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 Remains PGD 0. S/p bronch and extubated to HFNC on POD#2. Re-intubated on  CTS to manage chest tubes.     - Continue lung protective ventilation. Currently on A/C PC with Pi 18, Rate 20, Ti 0.85, PEEP 8, FiO2 40%. Maintain O2 sats >88%. Continue Xopenex nebs Q6 hrs. Continue immunosuppression and ppx per protocol. Continue Daily CXR. Hold tube feeds at midnight for possible bronchoscopy tomorrow. Plan for possible vent weaning and extubation tomorrow.

## 2019-06-23 NOTE — PROGRESS NOTES
"[facility]  Endocrinology  Progress Note    Admit Date: 6/17/2019     Reason for Consult: Management of  Hyperglycemia and CF related pancreatic insufficiency.     Surgical Procedure and Date: lung transplant in 2014; 6/18/19    HPI:   Patient is a 29 y.o. female with a diagnosis of CF, HTN, migraine headache, and osteopenia. 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 No personal history of DM. Endocrinology consulted for BG management.     Lab Results   Component Value Date    HGBA1C 4.7 01/09/2019                 Interval HPI:   Overnight events:  Remains in ICU, intubated and sedated. BG above goal on insulin infusion at 0.8 u/hr plus correction scale. Prednisone 50 mg daily; steroid taper per primary. Dexamethasone x1 this AM.   Eating:   NPO  Nausea: No  Hypoglycemia and intervention: No  Fever: No  TF: impact peptide at 20 cc/hr       BP (!) 142/99   Pulse 69   Temp 96.4 °F (35.8 °C)   Resp 20   Ht 5' 1" (1.549 m)   Wt 65 kg (143 lb 4.8 oz)   LMP 10/02/2016   SpO2 100%   Breastfeeding? No   BMI 27.08 kg/m²      Labs Reviewed and Include    Recent Labs   Lab 06/23/19  0400   *   CALCIUM 7.8*   ALBUMIN 2.2*   PROT 5.7*      K 4.0   CO2 25      BUN 14   CREATININE 0.7   ALKPHOS 134   ALT 27   AST 36   BILITOT 0.3     Lab Results   Component Value Date    WBC 8.13 06/23/2019    HGB 7.3 (L) 06/23/2019    HCT 23.1 (L) 06/23/2019    MCV 87 06/23/2019     (L) 06/23/2019     No results for input(s): TSH, FREET4 in the last 168 hours.  Lab Results   Component Value Date    HGBA1C 4.7 01/09/2019       Nutritional status:   Body mass index is 27.08 kg/m².  Lab Results   Component Value Date    ALBUMIN 2.2 (L) 06/23/2019    ALBUMIN 2.3 (L) 06/22/2019    ALBUMIN 2.3 (L) 06/22/2019     Lab Results   Component Value Date    PREALBUMIN 15 (L) 05/29/2014    PREALBUMIN 11 (L) 05/09/2014    PREALBUMIN 18 (L) 03/19/2014 "       Estimated Creatinine Clearance: 101.5 mL/min (based on SCr of 0.7 mg/dL).    Accu-Checks  Recent Labs     06/21/19  1145 06/21/19  1550 06/22/19  0001 06/22/19  0408 06/22/19  0801 06/22/19  1158 06/22/19  1610 06/22/19  2014 06/23/19  0000 06/23/19  0432   POCTGLUCOSE 197* 176* 256* 211* 179* 188* 252* 278* 274* 276*       Current Medications and/or Treatments Impacting Glycemic Control  Immunotherapy:    Immunosuppressants         Stop Route Frequency     tacrolimus (PROGRAF) 1 mg/mL oral syringe      -- PER NG TUBE 2 times daily     tacrolimus (PROGRAF) 1 mg/mL oral syringe      -- PER NG TUBE Once     mycophenolate (CELLCEPT) 500 mg in dextrose 5 % 100 mL IVPB      -- IV 2 times daily        Steroids:   Hormones (From admission, onward)    Start     Stop Route Frequency Ordered    06/25/19 0900  predniSONE tablet 25 mg      06/26 0859 PER NG TUBE Daily 06/22/19 0918    06/24/19 0900  predniSONE tablet 35 mg      06/25 0859 PER NG TUBE Daily 06/22/19 0916    06/23/19 0900  predniSONE tablet 50 mg      06/24 0859 PER NG TUBE Daily 06/22/19 0914        Pressors:    Autonomic Drugs (From admission, onward)    None        Hyperglycemia/Diabetes Medications:   Antihyperglycemics (From admission, onward)    Start     Stop Route Frequency Ordered    06/20/19 1545  insulin regular (Humulin R) 100 Units in sodium chloride 0.9% 100 mL infusion      -- IV Continuous 06/20/19 1442    06/20/19 1542  insulin aspart U-100 pen 0-4 Units      -- SubQ As needed (PRN) 06/20/19 1442          ASSESSMENT and PLAN    * S/P lung transplant  Managed per LUT.   avoid hypoglycemia        Hyperglycemia  BG goal 140 - 180.       increase insulin infusion at 1 u/hr.   BG monitoring every 4 hours and low dose correction scale.         Adrenal cortical steroids causing adverse effect in therapeutic use  Glucocorticoids markedly increase  glucoses. Expect the steroid taper will help glucose control.         Prophylactic immunotherapy  May  increase insulin resistance.       CF related Pancreatic insufficiency  May impact BG.           Amy Zapata NP  Endocrinology  [facility]

## 2019-06-23 NOTE — SUBJECTIVE & OBJECTIVE
Subjective:     Interval History: Remains intubated and sedated on propofol, fentanyl, and Precedex. Able to interact with staff via writing this morning. Anesthesia pain management following. One small BM yesterday following suppository/lactulose administration. No further BMs overnight following initiation of tube feeds yesterday. Plan for enema today. Chest tubes with 230 cc serosanguinous output and intermittent air leak. VBG this morning stable on current vent settings.       Continuous Infusions:   dexmedetomidine (PRECEDEX) infusion 1.4 mcg/kg/hr (06/23/19 1200)    dextrose 5 % and 0.45 % NaCl with KCl 20 mEq 5 mL/hr at 06/23/19 1200    fentanyl 300 mcg/hr (06/23/19 1200)    insulin (HUMAN R) infusion (adults) 1 Units/hr (06/23/19 1200)    nicardipine Stopped (06/22/19 1615)    propofol 50 mcg/kg/min (06/23/19 1200)     Scheduled Meds:   ciprofloxacin  400 mg Intravenous Q8H    enoxaparin  40 mg Subcutaneous Daily    fluconazole (DIFLUCAN) IVPB  400 mg Intravenous Q24H    ganciclovir (CYTOVENE) IVPB  5 mg/kg Intravenous Q12H    levalbuterol  1.25 mg Nebulization Q6H    mycophenolate (CELLCEPT) IVPB  500 mg Intravenous BID    polyethylene glycol  17 g Per NG tube Daily    [START ON 6/25/2019] predniSONE  25 mg Per NG tube Daily    [START ON 6/24/2019] predniSONE  35 mg Per NG tube Daily    tacrolimus  1.5 mg Per NG tube BID    vancomycin (VANCOCIN) IVPB  750 mg Intravenous Q12H     PRN Meds:bisacodyl, Dextrose 10% Bolus, Dextrose 10% Bolus, dextrose 50%, dextrose 50%, diphenhydrAMINE, glucagon (human recombinant), glucose, glucose, insulin aspart U-100, lactulose, levalbuterol, levalbuterol, lipase-protease-amylase (VIOKACE) 20,880-78,300- 78,300 units, magnesium sulfate IVPB, metoclopramide HCl, naloxone, ondansetron, ondansetron, oxyCODONE, oxyCODONE, potassium chloride in water **AND** potassium chloride in water **AND** potassium chloride in water, promethazine (PHENERGAN) IVPB    Review  of patient's allergies indicates:   Allergen Reactions    Albuterol Palpitations    Colistin Anaphylaxis    Vancomycin analogues      Infusion reaction that does not resolve with slowing  Pt. States she can tolerate it when given with 50 mg Benadryl and ran over 3 hours    Neupogen [filgrastim] Other (See Comments)     Ostealgia after five daily doses of 300 mcg.      Bactrim [sulfamethoxazole-trimethoprim] Hives    Ceftazidime Hives     Pt stated can tolerate cefapine not ceftazidime    Ceftazidime     Dronabinol Other (See Comments)     Mental changes/hallucinations    Haldol [haloperidol lactate] Other (See Comments)     Seizure like activity    Nsaids (non-steroidal anti-inflammatory drug)      Cannot have due to lung transplant    Adhesive Rash     Cloth tape- please use tegaderm or paper tape    Aztreonam Rash    Ciprofloxacin Nausea And Vomiting     Projectile N/V, per patient.  Unwilling to retry therapy.       Review of Systems   Unable to perform ROS: Intubated     Subjective:     Interval History: Re-intubated yesterday afternoon due to persistently altered mental status and for airway protection. Remains intubated and sedated with Precedex, propofol, and fentanyl. Lung compliance improving this AM on rounds and Pi decreased from 22 to 18 with goal to maintain tidal volumes ~390. VBG this morning 7.45/38/26/26.9. No BM despite lactulose/Miralax administration overnight. Plan to start trickle tube feeds and suppository.     Remains on insulin infusion per endocrine. Discussion with staff anesthesiologist this morning about pain management plans once patient is ready for extubation and will plan for low dose ketamine with hydromorphone PCA. No plans for extubation today.       Continuous Infusions:   dexmedetomidine (PRECEDEX) infusion 1.4 mcg/kg/hr (06/23/19 1200)    dextrose 5 % and 0.45 % NaCl with KCl 20 mEq 5 mL/hr at 06/23/19 1200    fentanyl 300 mcg/hr (06/23/19 1200)    insulin  (HUMAN R) infusion (adults) 1 Units/hr (06/23/19 1200)    nicardipine Stopped (06/22/19 1615)    propofol 50 mcg/kg/min (06/23/19 1200)     Scheduled Meds:   ciprofloxacin  400 mg Intravenous Q8H    enoxaparin  40 mg Subcutaneous Daily    fluconazole (DIFLUCAN) IVPB  400 mg Intravenous Q24H    ganciclovir (CYTOVENE) IVPB  5 mg/kg Intravenous Q12H    levalbuterol  1.25 mg Nebulization Q6H    mycophenolate (CELLCEPT) IVPB  500 mg Intravenous BID    polyethylene glycol  17 g Per NG tube Daily    [START ON 6/25/2019] predniSONE  25 mg Per NG tube Daily    [START ON 6/24/2019] predniSONE  35 mg Per NG tube Daily    tacrolimus  1.5 mg Per NG tube BID    vancomycin (VANCOCIN) IVPB  750 mg Intravenous Q12H     PRN Meds:bisacodyl, Dextrose 10% Bolus, Dextrose 10% Bolus, dextrose 50%, dextrose 50%, diphenhydrAMINE, glucagon (human recombinant), glucose, glucose, insulin aspart U-100, lactulose, levalbuterol, levalbuterol, lipase-protease-amylase (VIOKACE) 20,880-78,300- 78,300 units, magnesium sulfate IVPB, metoclopramide HCl, naloxone, ondansetron, ondansetron, oxyCODONE, oxyCODONE, potassium chloride in water **AND** potassium chloride in water **AND** potassium chloride in water, promethazine (PHENERGAN) IVPB    Review of patient's allergies indicates:   Allergen Reactions    Albuterol Palpitations    Colistin Anaphylaxis    Vancomycin analogues      Infusion reaction that does not resolve with slowing  Pt. States she can tolerate it when given with 50 mg Benadryl and ran over 3 hours    Neupogen [filgrastim] Other (See Comments)     Ostealgia after five daily doses of 300 mcg.      Bactrim [sulfamethoxazole-trimethoprim] Hives    Ceftazidime Hives     Pt stated can tolerate cefapine not ceftazidime    Ceftazidime     Dronabinol Other (See Comments)     Mental changes/hallucinations    Haldol [haloperidol lactate] Other (See Comments)     Seizure like activity    Nsaids (non-steroidal  anti-inflammatory drug)      Cannot have due to lung transplant    Adhesive Rash     Cloth tape- please use tegaderm or paper tape    Aztreonam Rash    Ciprofloxacin Nausea And Vomiting     Projectile N/V, per patient.  Unwilling to retry therapy.       Review of Systems   Unable to perform ROS: Intubated     Objective:   Physical Exam   Constitutional: She appears well-developed and well-nourished. No distress. She is sedated and intubated.   HENT:   Head: Normocephalic and atraumatic.   Right Ear: External ear normal.   Left Ear: External ear normal.   Nose: Nose normal.   ETT and OGT   Eyes: Conjunctivae and EOM are normal.   Neck: Neck supple. No JVD present.   Right IJ CVC with dressing c/d/i; right subclavian port with dressing c/d/i   Cardiovascular: Normal rate and regular rhythm. Exam reveals friction rub.   Murmur heard.  Pulmonary/Chest: She is intubated. No respiratory distress. She has decreased breath sounds in the right middle field, the right lower field and the left lower field. She has no wheezes. She has rhonchi. She has rales in the right upper field and the left upper field.   Bilateral pleural and mediastinal chest tubes with sanguinous output, bilateral airleak   Abdominal: Bowel sounds are decreased. She exhibits distension. There is no tenderness.   Protuberant abdomen   Genitourinary:   Genitourinary Comments: Goldberg to gravity   Musculoskeletal: She exhibits no edema.   Skin: Skin is warm and dry. She is not diaphoretic. There is pallor.   Psychiatric: Able to communicate via writing this morning. Remains intubated and sedated.   Nursing note and vitals reviewed.        Vital Signs (Most Recent):  Temp: 96.8 °F (36 °C) (06/23/19 1314)  Pulse: 69 (06/23/19 1314)  Resp: 20 (06/23/19 1314)  BP: (!) 145/101 (06/23/19 1314)  SpO2: 100 % (06/23/19 1314) Vital Signs (24h Range):  Temp:  [95.5 °F (35.3 °C)-98.8 °F (37.1 °C)] 96.8 °F (36 °C)  Pulse:  [64-83] 69  Resp:  [10-34] 20  SpO2:  [98  %-100 %] 100 %  BP: (114-173)/() 145/101     Weight: 65 kg (143 lb 4.8 oz)  Body mass index is 27.08 kg/m².      Intake/Output Summary (Last 24 hours) at 6/23/2019 1346  Last data filed at 6/23/2019 1200  Gross per 24 hour   Intake 4209 ml   Output 4360 ml   Net -151 ml       Ventilator Data:     Vent Mode: A/C  Oxygen Concentration (%):  [40-41] 40  Resp Rate Total:  [20 br/min-32 br/min] 22 br/min  PEEP/CPAP:  [8 cmH20] 8 cmH20  Mean Airway Pressure:  [13 cmH20-15 cmH20] 14 cmH20    Hemodynamic Parameters:       Lines/Drains:       Introducer right internal jugular (Active)   Specific Qualities Other (Comment) 6/22/2019  7:15 AM   Dressing Status Biopatch in place;Clean;Dry;Intact 6/22/2019  7:15 AM   Dressing Intervention Other (Comment) 6/22/2019  7:15 AM   Dressing Change Due 06/25/19 6/22/2019  7:15 AM   Daily Line Review Performed 6/22/2019  7:15 AM   Number of days:             Port A Cath Single Lumen 06/24/14 1200 right subclavian (Active)   Accessed by: Radha Ortega RN 6/17/2019  8:00 PM   Dressing Type Transparent 6/22/2019  7:15 AM   Dressing Status Biopatch in place;Clean;Dry;Intact 6/22/2019  7:15 AM   Dressing Intervention Other (Comment) 6/22/2019  7:15 AM   Dressing Change Due 06/24/19 6/22/2019  7:15 AM   Line Status Saline locked 6/22/2019  7:15 AM   Flush Performed No 6/22/2019  7:15 AM   Date to be Reflushed 06/18/19 6/17/2019  8:00 PM   Daily Line Review Performed 6/22/2019  7:15 AM   Type of Needle Nicole 6/22/2019  3:00 AM   Gauge 20 6/22/2019  3:00 AM   Needle Length 1 in 6/22/2019  3:00 AM   Needle Status Left in place 6/22/2019  3:00 AM   Needle Insertion Date 06/17/19 6/22/2019  3:00 AM   Needle Insertion Time 1900 6/17/2019  8:00 PM   Number of days: 1824            Percutaneous Central Line Insertion/Assessment - double lumen  06/18/19 0510 (Active)   Dressing biopatch in place;dressing dry and intact 6/22/2019  7:15 AM   Securement secured w/ sutures 6/22/2019  7:15 AM    Additional Site Signs no drainage;no streak formation;no palpable cord;no pain;no edema;no warmth;no erythema 6/22/2019  7:15 AM   Distal Patency/Care infusing 6/22/2019  7:15 AM   Proximal Patency/Care infusing 6/22/2019  7:15 AM   Waveform normal 6/22/2019  3:00 AM   Line Interventions line leveled/zeroed 6/22/2019  3:00 AM   Dressing Change Due 06/28/19 6/22/2019  7:15 AM   Daily Line Review Performed 6/22/2019  7:15 AM   Number of days: 4            Percutaneous Central Line Insertion/Assessment - triple lumen  right internal jugular (Active)   Dressing biopatch in place;dressing dry and intact 6/22/2019  7:15 AM   Securement secured w/ sutures 6/22/2019  7:15 AM   Additional Site Signs no drainage;no streak formation;no palpable cord;no pain;no edema;no warmth;no erythema 6/22/2019  7:15 AM   Distal Patency/Care infusing 6/22/2019  7:15 AM   Medial Patency/Care infusing 6/22/2019  7:15 AM   Proximal Patency/Care infusing 6/22/2019  7:15 AM   Waveform normal 6/22/2019  3:00 AM   Line Interventions line leveled/zeroed 6/22/2019  3:00 AM   Dressing Change Due 06/28/19 6/22/2019  7:15 AM   Daily Line Review Performed 6/22/2019  7:15 AM   Number of days:             NG/OG Tube 06/21/19 1715 Center mouth (Active)   Placement Check placement verified by x-ray;placement verified by aspirate characteristics 6/22/2019  7:15 AM   Tolerance no signs/symptoms of discomfort 6/22/2019  7:15 AM   Securement secured to commercial device 6/22/2019  7:15 AM   Clamp Status/Tolerance unclamped 6/22/2019  7:15 AM   Suction Setting/Drainage Method suction at;low;intermittent setting 6/22/2019  7:15 AM   Insertion Site Appearance no redness, warmth, tenderness, skin breakdown, drainage 6/22/2019  7:15 AM   Drainage Brown;Clear 6/22/2019  7:15 AM   Flush/Irrigation flushed w/;water;no resistance met 6/22/2019  7:15 AM   Intake (mL) 150 mL 6/22/2019  9:00 AM   Tube Output(mL)(Include Discarded Residual) 0 mL 6/22/2019  7:00 AM  "  Number of days: 0            Urethral Catheter 06/18/19 0504 Non-latex;Straight-tip;Temperature probe 14 Fr. (Active)   Site Assessment Clean;Intact 6/22/2019  7:15 AM   Collection Container Urimeter 6/22/2019  7:15 AM   Securement Method secured to top of thigh w/ adhesive device 6/22/2019  7:15 AM   Catheter Care Performed yes 6/22/2019  7:15 AM   Reason for Continuing Urinary Catheterization Critically ill in ICU requiring intensive monitoring;Post operative 6/22/2019  7:15 AM   CAUTI Prevention Bundle StatLock in place w 1" slack;Intact seal between catheter & drainage tubing;Green sheeting clip in use;Drainage bag off the floor;No dependent loops or kinks;Drainage bag not overfilled (<2/3 full);Drainage bag below bladder 6/22/2019  7:15 AM   Output (mL) 225 mL 6/22/2019  1:00 PM   Number of days: 4            Y Chest Tube 1 and 2 06/18/19 1151 1 Right Mediastinal 19 Fr. 2 Right Pleural 19 Fr. (Active)   Function -20 cm H2O 6/22/2019  7:15 AM   Air Leak/Fluctuation air leak not present;fluctuation not present 6/22/2019  7:15 AM   Safety all tubing connections taped;2 rubber-tipped hemostats w/ patient;all connections secured;suction checked 6/22/2019  7:15 AM   Securement tubing secured to body distal to insertion site w/ tape 6/22/2019  7:15 AM   Left Subcutaneous Emphysema none present 6/22/2019  7:15 AM   Right Subcutaneous Emphysema none present 6/22/2019  7:15 AM   Patency Intervention Tip/tilt 6/22/2019  7:15 AM   Drainage Description 1 Serosanguineous 6/22/2019  7:15 AM   Tube 1 Dressing Appearance occlusive gauze dressing intact;clean and dry 6/22/2019  7:15 AM   Tube 1 Dressing Care dressing changed 6/21/2019  3:15 PM   Site Assessment 1 Not assessed;Other (Comment) 6/22/2019  7:15 AM   Surrounding Skin 1 Dry;Intact 6/22/2019  7:15 AM   Drainage Description 2 Serosanguineous 6/22/2019  7:15 AM   Tube 2 Dressing Appearance occlusive gauze dressing intact;clean and dry 6/22/2019  7:15 AM   Tube 2 " Dressing Care dressing changed 6/21/2019  3:15 PM   Site Assessment 2 Not assessed;Other (Comment) 6/22/2019  7:15 AM   Surrounding Skin Unable to view 6/22/2019  7:15 AM   Output (mL) 0 mL 6/22/2019 11:00 AM   Number of days: 4            Y Chest Tube 3 and 4 06/18/19 1151 3 Left Pleural 19 Fr. 4 Left Mediastinal 19 Fr. (Active)   Function -20 cm H2O 6/22/2019  7:15 AM   Air Leak/Fluctuation air leak present;fluctuation present;connections tightened 6/22/2019  7:15 AM   Safety all connections secured;all tubing connections taped;2 rubber-tipped hemostats w/ patient;suction checked 6/22/2019  7:15 AM   Securement tubing secured to body distal to insertion site w/ tape 6/22/2019  7:15 AM   Left Subcutaneous Emphysema none present 6/22/2019  7:15 AM   Right Subcutaneous Emphysema none present 6/22/2019  7:15 AM   Patency Intervention Tip/tilt 6/22/2019  7:15 AM   Drainage Description 3 Serosanguineous 6/22/2019  7:15 AM   Tube 3 Dressing Appearance occlusive gauze dressing intact;clean and dry 6/22/2019  7:15 AM   Tube 3 Dressing Care dressing reinforced 6/22/2019  7:15 AM   Site Assessment 3 Not assessed;Other (Comment) 6/22/2019  7:15 AM   Surrounding Skin 3 Unable to view 6/22/2019  7:15 AM   Drainage Description 4 Serosanguineous 6/22/2019  7:15 AM   Tube 4 Dressing Appearance occlusive gauze dressing intact;clean and dry 6/22/2019  7:15 AM   Tube 4 Dressing Care dressing reinforced 6/22/2019  7:15 AM   Site Assessment 4 Not assessed;Other (Comment) 6/22/2019  7:15 AM   Surrounding Skin 4 Dry;Intact 6/22/2019  3:00 AM   Output (mL) 20 mL 6/22/2019 11:00 AM   Number of days: 4       Significant Labs:  CBC:  Recent Labs   Lab 06/23/19  0400   WBC 8.13   RBC 2.66*   HGB 7.3*   HCT 23.1*   *   MCV 87   MCH 27.4   MCHC 31.6*     BMP:  Recent Labs   Lab 06/23/19  0400      K 4.0      CO2 25   BUN 14   CREATININE 0.7   CALCIUM 7.8*      Tacrolimus Levels:  Recent Labs   Lab 06/23/19  0400   TACROLIMUS  1.9*     Microbiology:  Microbiology Results (last 7 days)     Procedure Component Value Units Date/Time    Culture, Respiratory with Gram Stain [[id number]]  (Susceptibility) Collected:  06/20/19 0808    Order Status:  Completed Specimen:  Respiratory from Bronchial Wash Updated:  06/22/19 1018     Respiratory Culture No S aureus isolated.     Respiratory Culture --     PSEUDOMONAS AERUGINOSA   Rare       Gram Stain (Respiratory) <10 epithelial cells per low power field.     Gram Stain (Respiratory) No WBC's     Gram Stain (Respiratory) No organisms seen    AFB Culture & Smear [[id number]] Collected:  06/20/19 0808    Order Status:  Completed Specimen:  Respiratory from Bronchial Wash Updated:  06/21/19 2127     AFB Culture & Smear Culture in progress     AFB CULTURE STAIN No acid fast bacilli seen.    Fungus culture [[id number]]     Order Status:  Canceled Specimen:  Respiratory from Bronchial Wash     Culture, Respiratory with Gram Stain [[id number]]     Order Status:  Canceled Specimen:  Respiratory from Bronchial Wash     AFB Culture & Smear [[id number]]     Order Status:  Canceled Specimen:  Respiratory from Bronchial Wash     Aerobic culture [[id number]] Collected:  06/18/19 0930    Order Status:  Completed Specimen:  Tissue from Lung, Left Updated:  06/20/19 1323     Aerobic Bacterial Culture --     CANDIDA ALBICANS  Few  No other significant isolate      Narrative:       Donor lung    Culture, Anaerobe [[id number]] Collected:  06/18/19 0930    Order Status:  Completed Specimen:  Tissue from Lung, Left Updated:  06/20/19 1040     Anaerobic Culture Culture in progress    Narrative:       Donor lung    Fungus culture [[id number]] Collected:  06/20/19 0808    Order Status:  Sent Specimen:  Respiratory from Bronchial Wash Updated:  06/20/19 0936    AFB Culture & Smear [[id number]] Collected:  06/18/19 0930    Order Status:  Completed Specimen:  Tissue from Lung, Left Updated:  06/19/19 2127     AFB Culture & Smear  Culture in progress     AFB CULTURE STAIN No acid fast bacilli seen.    Narrative:       Donor lung    Urine culture [[id number]] Collected:  06/17/19 1753    Order Status:  Completed Specimen:  Urine, Clean Catch Updated:  06/19/19 0117     Urine Culture, Routine No significant growth    Narrative:       Indicated criteria for high risk culture:->Other  Other (specify):->awaiting lung transplant    Gram stain [[id number]] Collected:  06/18/19 0930    Order Status:  Completed Specimen:  Tissue from Lung, Left Updated:  06/18/19 1041     Gram Stain Result Moderate WBC's      No organisms seen    Narrative:       Donor lung    Fungus culture [[id number]] Collected:  06/18/19 0930    Order Status:  Sent Specimen:  Tissue from Lung, Left Updated:  06/18/19 0944          I have reviewed all pertinent labs within the past 24 hours.    Diagnostic Results:  Chest X-Ray: I personally reviewed the films and findings are: increased opacification in the right middle and lower lung fields    No Further  Objective:   Physical Exam   Constitutional: She appears well-developed and well-nourished. No distress. She is sedated and intubated.   HENT:   Head: Normocephalic and atraumatic.   Right Ear: External ear normal.   Left Ear: External ear normal.   Nose: Nose normal.   ETT and OGT   Eyes: Conjunctivae and EOM are normal.   Neck: Neck supple. No JVD present.   Right IJ CVC with dressing c/d/i; right subclavian port with dressing c/d/i   Cardiovascular: Normal rate and regular rhythm. Exam reveals friction rub.   Murmur heard.  Pulmonary/Chest: She is intubated. No respiratory distress. She has decreased breath sounds in the right middle field, the right lower field and the left lower field. She has no wheezes. She has rhonchi.   Bilateral pleural and mediastinal chest tubes with sanguinous output, bilateral airleak   Abdominal: She exhibits distension. Bowel sounds are decreased. There is no tenderness.   Protuberant abdomen    Genitourinary:   Genitourinary Comments: Goldberg to gravity   Musculoskeletal: She exhibits no edema.   Skin: Skin is warm and dry. She is not diaphoretic. There is pallor.   Nursing note and vitals reviewed.        Vital Signs (Most Recent):  Temp: 96.8 °F (36 °C) (06/23/19 1314)  Pulse: 69 (06/23/19 1314)  Resp: 20 (06/23/19 1314)  BP: (!) 145/101 (06/23/19 1314)  SpO2: 100 % (06/23/19 1314) Vital Signs (24h Range):  Temp:  [95.5 °F (35.3 °C)-98.8 °F (37.1 °C)] 96.8 °F (36 °C)  Pulse:  [64-83] 69  Resp:  [10-34] 20  SpO2:  [98 %-100 %] 100 %  BP: (114-173)/() 145/101     Weight: 65 kg (143 lb 4.8 oz)  Body mass index is 27.08 kg/m².      Intake/Output Summary (Last 24 hours) at 6/23/2019 1346  Last data filed at 6/23/2019 1200  Gross per 24 hour   Intake 4209 ml   Output 4360 ml   Net -151 ml       Ventilator Data:     Vent Mode: A/C  Oxygen Concentration (%):  [40-41] 40  Resp Rate Total:  [20 br/min-32 br/min] 22 br/min  PEEP/CPAP:  [8 cmH20] 8 cmH20  Mean Airway Pressure:  [13 cmH20-15 cmH20] 14 cmH20    Hemodynamic Parameters:       Lines/Drains:       Introducer right internal jugular (Active)   Specific Qualities Other (Comment) 6/22/2019  7:15 AM   Dressing Status Biopatch in place;Clean;Dry;Intact 6/22/2019  7:15 AM   Dressing Intervention Other (Comment) 6/22/2019  7:15 AM   Dressing Change Due 06/25/19 6/22/2019  7:15 AM   Daily Line Review Performed 6/22/2019  7:15 AM   Number of days:             Port A Cath Single Lumen 06/24/14 1200 right subclavian (Active)   Accessed by: Radha Ortega RN 6/17/2019  8:00 PM   Dressing Type Transparent 6/22/2019  7:15 AM   Dressing Status Biopatch in place;Clean;Dry;Intact 6/22/2019  7:15 AM   Dressing Intervention Other (Comment) 6/22/2019  7:15 AM   Dressing Change Due 06/24/19 6/22/2019  7:15 AM   Line Status Saline locked 6/22/2019  7:15 AM   Flush Performed No 6/22/2019  7:15 AM   Date to be Reflushed 06/18/19 6/17/2019  8:00 PM   Daily Line Review  Performed 6/22/2019  7:15 AM   Type of Needle Nicole 6/22/2019  3:00 AM   Gauge 20 6/22/2019  3:00 AM   Needle Length 1 in 6/22/2019  3:00 AM   Needle Status Left in place 6/22/2019  3:00 AM   Needle Insertion Date 06/17/19 6/22/2019  3:00 AM   Needle Insertion Time 1900 6/17/2019  8:00 PM   Number of days: 1824            Percutaneous Central Line Insertion/Assessment - double lumen  06/18/19 0510 (Active)   Dressing biopatch in place;dressing dry and intact 6/22/2019  7:15 AM   Securement secured w/ sutures 6/22/2019  7:15 AM   Additional Site Signs no drainage;no streak formation;no palpable cord;no pain;no edema;no warmth;no erythema 6/22/2019  7:15 AM   Distal Patency/Care infusing 6/22/2019  7:15 AM   Proximal Patency/Care infusing 6/22/2019  7:15 AM   Waveform normal 6/22/2019  3:00 AM   Line Interventions line leveled/zeroed 6/22/2019  3:00 AM   Dressing Change Due 06/28/19 6/22/2019  7:15 AM   Daily Line Review Performed 6/22/2019  7:15 AM   Number of days: 4            Percutaneous Central Line Insertion/Assessment - triple lumen  right internal jugular (Active)   Dressing biopatch in place;dressing dry and intact 6/22/2019  7:15 AM   Securement secured w/ sutures 6/22/2019  7:15 AM   Additional Site Signs no drainage;no streak formation;no palpable cord;no pain;no edema;no warmth;no erythema 6/22/2019  7:15 AM   Distal Patency/Care infusing 6/22/2019  7:15 AM   Medial Patency/Care infusing 6/22/2019  7:15 AM   Proximal Patency/Care infusing 6/22/2019  7:15 AM   Waveform normal 6/22/2019  3:00 AM   Line Interventions line leveled/zeroed 6/22/2019  3:00 AM   Dressing Change Due 06/28/19 6/22/2019  7:15 AM   Daily Line Review Performed 6/22/2019  7:15 AM   Number of days:             NG/OG Tube 06/21/19 1715 Center mouth (Active)   Placement Check placement verified by x-ray;placement verified by aspirate characteristics 6/22/2019  7:15 AM   Tolerance no signs/symptoms of discomfort 6/22/2019  7:15 AM  "  Securement secured to commercial device 6/22/2019  7:15 AM   Clamp Status/Tolerance unclamped 6/22/2019  7:15 AM   Suction Setting/Drainage Method suction at;low;intermittent setting 6/22/2019  7:15 AM   Insertion Site Appearance no redness, warmth, tenderness, skin breakdown, drainage 6/22/2019  7:15 AM   Drainage Brown;Clear 6/22/2019  7:15 AM   Flush/Irrigation flushed w/;water;no resistance met 6/22/2019  7:15 AM   Intake (mL) 150 mL 6/22/2019  9:00 AM   Tube Output(mL)(Include Discarded Residual) 0 mL 6/22/2019  7:00 AM   Number of days: 0            Urethral Catheter 06/18/19 0504 Non-latex;Straight-tip;Temperature probe 14 Fr. (Active)   Site Assessment Clean;Intact 6/22/2019  7:15 AM   Collection Container Urimeter 6/22/2019  7:15 AM   Securement Method secured to top of thigh w/ adhesive device 6/22/2019  7:15 AM   Catheter Care Performed yes 6/22/2019  7:15 AM   Reason for Continuing Urinary Catheterization Critically ill in ICU requiring intensive monitoring;Post operative 6/22/2019  7:15 AM   CAUTI Prevention Bundle StatLock in place w 1" slack;Intact seal between catheter & drainage tubing;Green sheeting clip in use;Drainage bag off the floor;No dependent loops or kinks;Drainage bag not overfilled (<2/3 full);Drainage bag below bladder 6/22/2019  7:15 AM   Output (mL) 225 mL 6/22/2019  1:00 PM   Number of days: 4            Y Chest Tube 1 and 2 06/18/19 1151 1 Right Mediastinal 19 Fr. 2 Right Pleural 19 Fr. (Active)   Function -20 cm H2O 6/22/2019  7:15 AM   Air Leak/Fluctuation air leak not present;fluctuation not present 6/22/2019  7:15 AM   Safety all tubing connections taped;2 rubber-tipped hemostats w/ patient;all connections secured;suction checked 6/22/2019  7:15 AM   Securement tubing secured to body distal to insertion site w/ tape 6/22/2019  7:15 AM   Left Subcutaneous Emphysema none present 6/22/2019  7:15 AM   Right Subcutaneous Emphysema none present 6/22/2019  7:15 AM   Patency " Intervention Tip/tilt 6/22/2019  7:15 AM   Drainage Description 1 Serosanguineous 6/22/2019  7:15 AM   Tube 1 Dressing Appearance occlusive gauze dressing intact;clean and dry 6/22/2019  7:15 AM   Tube 1 Dressing Care dressing changed 6/21/2019  3:15 PM   Site Assessment 1 Not assessed;Other (Comment) 6/22/2019  7:15 AM   Surrounding Skin 1 Dry;Intact 6/22/2019  7:15 AM   Drainage Description 2 Serosanguineous 6/22/2019  7:15 AM   Tube 2 Dressing Appearance occlusive gauze dressing intact;clean and dry 6/22/2019  7:15 AM   Tube 2 Dressing Care dressing changed 6/21/2019  3:15 PM   Site Assessment 2 Not assessed;Other (Comment) 6/22/2019  7:15 AM   Surrounding Skin Unable to view 6/22/2019  7:15 AM   Output (mL) 0 mL 6/22/2019 11:00 AM   Number of days: 4            Y Chest Tube 3 and 4 06/18/19 1151 3 Left Pleural 19 Fr. 4 Left Mediastinal 19 Fr. (Active)   Function -20 cm H2O 6/22/2019  7:15 AM   Air Leak/Fluctuation air leak present;fluctuation present;connections tightened 6/22/2019  7:15 AM   Safety all connections secured;all tubing connections taped;2 rubber-tipped hemostats w/ patient;suction checked 6/22/2019  7:15 AM   Securement tubing secured to body distal to insertion site w/ tape 6/22/2019  7:15 AM   Left Subcutaneous Emphysema none present 6/22/2019  7:15 AM   Right Subcutaneous Emphysema none present 6/22/2019  7:15 AM   Patency Intervention Tip/tilt 6/22/2019  7:15 AM   Drainage Description 3 Serosanguineous 6/22/2019  7:15 AM   Tube 3 Dressing Appearance occlusive gauze dressing intact;clean and dry 6/22/2019  7:15 AM   Tube 3 Dressing Care dressing reinforced 6/22/2019  7:15 AM   Site Assessment 3 Not assessed;Other (Comment) 6/22/2019  7:15 AM   Surrounding Skin 3 Unable to view 6/22/2019  7:15 AM   Drainage Description 4 Serosanguineous 6/22/2019  7:15 AM   Tube 4 Dressing Appearance occlusive gauze dressing intact;clean and dry 6/22/2019  7:15 AM   Tube 4 Dressing Care dressing reinforced  6/22/2019  7:15 AM   Site Assessment 4 Not assessed;Other (Comment) 6/22/2019  7:15 AM   Surrounding Skin 4 Dry;Intact 6/22/2019  3:00 AM   Output (mL) 20 mL 6/22/2019 11:00 AM   Number of days: 4       Significant Labs:  CBC:  Recent Labs   Lab 06/23/19  0400   WBC 8.13   RBC 2.66*   HGB 7.3*   HCT 23.1*   *   MCV 87   MCH 27.4   MCHC 31.6*     BMP:  Recent Labs   Lab 06/23/19  0400      K 4.0      CO2 25   BUN 14   CREATININE 0.7   CALCIUM 7.8*      Tacrolimus Levels:  Recent Labs   Lab 06/23/19  0400   TACROLIMUS 1.9*     Microbiology:  Microbiology Results (last 7 days)     Procedure Component Value Units Date/Time    Culture, Respiratory with Gram Stain [[id number]]  (Susceptibility) Collected:  06/20/19 0808    Order Status:  Completed Specimen:  Respiratory from Bronchial Wash Updated:  06/22/19 1018     Respiratory Culture No S aureus isolated.     Respiratory Culture --     PSEUDOMONAS AERUGINOSA   Rare       Gram Stain (Respiratory) <10 epithelial cells per low power field.     Gram Stain (Respiratory) No WBC's     Gram Stain (Respiratory) No organisms seen    AFB Culture & Smear [[id number]] Collected:  06/20/19 0808    Order Status:  Completed Specimen:  Respiratory from Bronchial Wash Updated:  06/21/19 2127     AFB Culture & Smear Culture in progress     AFB CULTURE STAIN No acid fast bacilli seen.    Fungus culture [[id number]]     Order Status:  Canceled Specimen:  Respiratory from Bronchial Wash     Culture, Respiratory with Gram Stain [[id number]]     Order Status:  Canceled Specimen:  Respiratory from Bronchial Wash     AFB Culture & Smear [[id number]]     Order Status:  Canceled Specimen:  Respiratory from Bronchial Wash     Aerobic culture [[id number]] Collected:  06/18/19 0930    Order Status:  Completed Specimen:  Tissue from Lung, Left Updated:  06/20/19 1323     Aerobic Bacterial Culture --     CANDIDA ALBICANS  Few  No other significant isolate      Narrative:       Donor  lung    Culture, Anaerobe [[id number]] Collected:  06/18/19 0930    Order Status:  Completed Specimen:  Tissue from Lung, Left Updated:  06/20/19 1040     Anaerobic Culture Culture in progress    Narrative:       Donor lung    Fungus culture [[id number]] Collected:  06/20/19 0808    Order Status:  Sent Specimen:  Respiratory from Bronchial Wash Updated:  06/20/19 0936    AFB Culture & Smear [[id number]] Collected:  06/18/19 0930    Order Status:  Completed Specimen:  Tissue from Lung, Left Updated:  06/19/19 2127     AFB Culture & Smear Culture in progress     AFB CULTURE STAIN No acid fast bacilli seen.    Narrative:       Donor lung    Urine culture [[id number]] Collected:  06/17/19 1753    Order Status:  Completed Specimen:  Urine, Clean Catch Updated:  06/19/19 0117     Urine Culture, Routine No significant growth    Narrative:       Indicated criteria for high risk culture:->Other  Other (specify):->awaiting lung transplant    Gram stain [[id number]] Collected:  06/18/19 0930    Order Status:  Completed Specimen:  Tissue from Lung, Left Updated:  06/18/19 1041     Gram Stain Result Moderate WBC's      No organisms seen    Narrative:       Donor lung    Fungus culture [[id number]] Collected:  06/18/19 0930    Order Status:  Sent Specimen:  Tissue from Lung, Left Updated:  06/18/19 0944          I have reviewed all pertinent labs within the past 24 hours.    Diagnostic Results:  Chest X-Ray: I personally reviewed the films and findings are: increased opacification in the right middle and lower lung fields    No Further

## 2019-06-23 NOTE — PROGRESS NOTES
Pharmacokinetic Assessment Follow Up: IV Vancomycin    Vancomycin serum concentration assessment(s):    The trough level of 13.6 drawn on 6/22 at 1525 was drawn correctly and can be used to guide therapy at this time.       Vancomycin Regimen Plan:    Continue regimen to Vancomycin 750 mg IV every 12hour with next serum trough concentration measured at 0330 prior to 4th dose on 6/25/19.    Pharmacy will continue to follow and monitor vancomycin.    Please contact pharmacy at extension [phone] for questions regarding this assessment.    Thank you for the consult,   Nicole Bhatia     Patient brief summary:  Juanita Ibarra is a 29 y.o. 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 States she can tolerate it when given with 50 mg Benadryl and ran over 3 hours    Neupogen [filgrastim] Other (See Comments)     Ostealgia after five daily doses of 300 mcg.      Bactrim [sulfamethoxazole-trimethoprim] Hives    Ceftazidime Hives     Pt stated can tolerate cefapine not ceftazidime    Ceftazidime     Dronabinol Other (See Comments)     Mental changes/hallucinations    Haldol [haloperidol lactate] Other (See Comments)     Seizure like activity    Nsaids (non-steroidal anti-inflammatory drug)      Cannot have due to lung transplant    Adhesive Rash     Cloth tape- please use tegaderm or paper tape    Aztreonam Rash    Ciprofloxacin Nausea And Vomiting     Projectile N/V, per patient.  Unwilling to retry therapy.       Actual Body Weight:   65 kg    Renal Function:   Estimated Creatinine Clearance: 101.5 mL/min (based on SCr of 0.7 mg/dL).,       CBC (last 72 hours):  Recent Labs   Lab Result Units 06/20/19  9979  06/21/19  0400 06/22/19  0534 06/23/19  0400   WBC K/uL 13.16* 13.60* 7.82 8.13   Hemoglobin g/dL 8.9* 8.5* 7.6* 7.3*   Hematocrit % 26.8* 26.6* 23.3* 23.1*   Platelets K/uL 97* 113* 105* 115*   Gran% % 95.1* 92.7* 89.4* 90.1*   Lymph% % 2.3* 2.9* 6.6* 5.4*   Mono% % 1.7* 3.6* 3.5* 4.1   Eosinophil% % 0.1 0.0 0.0 0.0   Basophil% % 0.1 0.1 0.1 0.0   Differential Method  Automated Automated Automated Automated       Metabolic Panel (last 72 hours):  Recent Labs   Lab Result Units 06/20/19  1535 06/21/19  0400 06/22/19  0422 06/22/19  0534 06/23/19  0400   Sodium mmol/L 138 141 135* 138 136   Potassium mmol/L 4.5 4.6 3.5 3.8 4.0   Chloride mmol/L 103 104 101 103 103   CO2 mmol/L 28 29 26 27 25   Glucose mg/dL 152* 123* 180* 209* 235*   BUN, Bld mg/dL 16 13 12 12 14   Creatinine mg/dL 0.8 0.7 0.7 0.7 0.7   Albumin g/dL 2.9* 2.9* 2.3* 2.3* 2.2*   Total Bilirubin mg/dL 0.4 0.5 0.4 0.4 0.3   Alkaline Phosphatase U/L 160* 161* 133 124 134   AST U/L 37 44* 24 22 36   ALT U/L 29 31 21 23 27   Magnesium mg/dL 2.3 2.1 1.9 1.8 2.9*   Phosphorus mg/dL 3.2  --   --   --   --        Vancomycin Administrations:  vancomycin given in the last 96 hours                   vancomycin 750 mg in dextrose 5 % 250 mL IVPB (ready to mix system) (mg) 750 mg New Bag 06/23/19 0322     750 mg New Bag 06/22/19 1555    vancomycin 750 mg in dextrose 5 % 250 mL IVPB (ready to mix system) (mg) 750 mg New Bag 06/22/19 0343     750 mg New Bag 06/21/19 1555    vancomycin 750 mg in dextrose 5 % 250 mL IVPB (ready to mix system) (mg) 750 mg New Bag 06/20/19 1935                Drug levels (last 3 results):  Recent Labs   Lab Result Units 06/21/19  0400 06/22/19  1525   Vancomycin-Trough ug/mL 24.0* 13.6       Microbiologic Results:  Microbiology Results (last 7 days)     Procedure Component Value Units Date/Time    Culture, Respiratory with Gram Stain [[id number]]  (Susceptibility) Collected:  06/20/19 0808    Order Status:  Completed Specimen:  Respiratory  from Bronchial Wash Updated:  06/22/19 1018     Respiratory Culture No S aureus isolated.     Respiratory Culture --     PSEUDOMONAS AERUGINOSA   Rare       Gram Stain (Respiratory) <10 epithelial cells per low power field.     Gram Stain (Respiratory) No WBC's     Gram Stain (Respiratory) No organisms seen    AFB Culture & Smear [[id number]] Collected:  06/20/19 0808    Order Status:  Completed Specimen:  Respiratory from Bronchial Wash Updated:  06/21/19 2127     AFB Culture & Smear Culture in progress     AFB CULTURE STAIN No acid fast bacilli seen.    Fungus culture [[id number]]     Order Status:  Canceled Specimen:  Respiratory from Bronchial Wash     Culture, Respiratory with Gram Stain [[id number]]     Order Status:  Canceled Specimen:  Respiratory from Bronchial Wash     AFB Culture & Smear [[id number]]     Order Status:  Canceled Specimen:  Respiratory from Bronchial Wash     Aerobic culture [[id number]] Collected:  06/18/19 0930    Order Status:  Completed Specimen:  Tissue from Lung, Left Updated:  06/20/19 1323     Aerobic Bacterial Culture --     CANDIDA ALBICANS  Few  No other significant isolate      Narrative:       Donor lung    Culture, Anaerobe [[id number]] Collected:  06/18/19 0930    Order Status:  Completed Specimen:  Tissue from Lung, Left Updated:  06/20/19 1040     Anaerobic Culture Culture in progress    Narrative:       Donor lung    Fungus culture [[id number]] Collected:  06/20/19 0808    Order Status:  Sent Specimen:  Respiratory from Bronchial Wash Updated:  06/20/19 0936    AFB Culture & Smear [[id number]] Collected:  06/18/19 0930    Order Status:  Completed Specimen:  Tissue from Lung, Left Updated:  06/19/19 2127     AFB Culture & Smear Culture in progress     AFB CULTURE STAIN No acid fast bacilli seen.    Narrative:       Donor lung    Urine culture [[id number]] Collected:  06/17/19 1753    Order Status:  Completed Specimen:  Urine, Clean Catch Updated:  06/19/19 0117     Urine Culture,  Routine No significant growth    Narrative:       Indicated criteria for high risk culture:->Other  Other (specify):->awaiting lung transplant    Gram stain [[id number]] Collected:  06/18/19 0930    Order Status:  Completed Specimen:  Tissue from Lung, Left Updated:  06/18/19 1041     Gram Stain Result Moderate WBC's      No organisms seen    Narrative:       Donor lung    Fungus culture [[id number]] Collected:  06/18/19 0930    Order Status:  Sent Specimen:  Tissue from Lung, Left Updated:  06/18/19 0944

## 2019-06-23 NOTE — PROGRESS NOTES
[facility]  Lung Transplant  Progress Note - Critical Care    Patient Name: Juanita Ibarra  MRN: [id number]  Admission Date: 6/17/2019  Hospital Length of Stay: 5 days  Post-Operative Day: 1837 (Lung), 5 (Lung)  Attending Physician: Francisca Morin DO  Primary Care Provider: Primary Doctor No     Subjective:     Interval History: Remains intubated and sedated on propofol, fentanyl, and Precedex. Able to interact with staff via writing this morning. Anesthesia pain management following. One small BM yesterday following suppository/lactulose administration. No further BMs overnight following initiation of tube feeds yesterday. Plan for enema today. Chest tubes with 230 cc serosanguinous output and intermittent air leak. VBG this morning stable on current vent settings. Significant other at bedside and updated on plan for possible bronchoscopy and extubation tomorrow.      Continuous Infusions:   dexmedetomidine (PRECEDEX) infusion 1.4 mcg/kg/hr (06/23/19 1200)    dextrose 5 % and 0.45 % NaCl with KCl 20 mEq 5 mL/hr at 06/23/19 1200    fentanyl 300 mcg/hr (06/23/19 1200)    insulin (HUMAN R) infusion (adults) 1 Units/hr (06/23/19 1200)    nicardipine Stopped (06/22/19 1615)    propofol 50 mcg/kg/min (06/23/19 1200)     Scheduled Meds:   ciprofloxacin  400 mg Intravenous Q8H    enoxaparin  40 mg Subcutaneous Daily    fluconazole (DIFLUCAN) IVPB  400 mg Intravenous Q24H    ganciclovir (CYTOVENE) IVPB  5 mg/kg Intravenous Q12H    levalbuterol  1.25 mg Nebulization Q6H    mycophenolate (CELLCEPT) IVPB  500 mg Intravenous BID    polyethylene glycol  17 g Per NG tube Daily    [START ON 6/25/2019] predniSONE  25 mg Per NG tube Daily    [START ON 6/24/2019] predniSONE  35 mg Per NG tube Daily    tacrolimus  1.5 mg Per NG tube BID    vancomycin (VANCOCIN) IVPB  750 mg Intravenous Q12H     PRN Meds:bisacodyl, Dextrose 10% Bolus, Dextrose 10% Bolus, dextrose 50%, dextrose 50%,  diphenhydrAMINE, glucagon (human recombinant), glucose, glucose, insulin aspart U-100, lactulose, levalbuterol, levalbuterol, lipase-protease-amylase (VIOKACE) 20,880-78,300- 78,300 units, magnesium sulfate IVPB, metoclopramide HCl, naloxone, ondansetron, ondansetron, oxyCODONE, oxyCODONE, potassium chloride in water **AND** potassium chloride in water **AND** potassium chloride in water, promethazine (PHENERGAN) IVPB    Review of patient's allergies indicates:   Allergen Reactions    Albuterol Palpitations    Colistin Anaphylaxis    Vancomycin analogues      Infusion reaction that does not resolve with slowing  Pt. States she can tolerate it when given with 50 mg Benadryl and ran over 3 hours    Neupogen [filgrastim] Other (See Comments)     Ostealgia after five daily doses of 300 mcg.      Bactrim [sulfamethoxazole-trimethoprim] Hives    Ceftazidime Hives     Pt stated can tolerate cefapine not ceftazidime    Ceftazidime     Dronabinol Other (See Comments)     Mental changes/hallucinations    Haldol [haloperidol lactate] Other (See Comments)     Seizure like activity    Nsaids (non-steroidal anti-inflammatory drug)      Cannot have due to lung transplant    Adhesive Rash     Cloth tape- please use tegaderm or paper tape    Aztreonam Rash    Ciprofloxacin Nausea And Vomiting     Projectile N/V, per patient.  Unwilling to retry therapy.       Review of Systems   Unable to perform ROS: Intubated     Subjective:     Interval History: Re-intubated yesterday afternoon due to persistently altered mental status and for airway protection. Remains intubated and sedated with Precedex, propofol, and fentanyl. Lung compliance improving this AM on rounds and Pi decreased from 22 to 18 with goal to maintain tidal volumes ~390. VBG this morning 7.45/38/26/26.9. No BM despite lactulose/Miralax administration overnight. Plan to start trickle tube feeds and suppository.     Remains on insulin infusion per endocrine.  Discussion with staff anesthesiologist this morning about pain management plans once patient is ready for extubation and will plan for low dose ketamine with hydromorphone PCA. No plans for extubation today.       Continuous Infusions:   dexmedetomidine (PRECEDEX) infusion 1.4 mcg/kg/hr (06/23/19 1200)    dextrose 5 % and 0.45 % NaCl with KCl 20 mEq 5 mL/hr at 06/23/19 1200    fentanyl 300 mcg/hr (06/23/19 1200)    insulin (HUMAN R) infusion (adults) 1 Units/hr (06/23/19 1200)    nicardipine Stopped (06/22/19 1615)    propofol 50 mcg/kg/min (06/23/19 1200)     Scheduled Meds:   ciprofloxacin  400 mg Intravenous Q8H    enoxaparin  40 mg Subcutaneous Daily    fluconazole (DIFLUCAN) IVPB  400 mg Intravenous Q24H    ganciclovir (CYTOVENE) IVPB  5 mg/kg Intravenous Q12H    levalbuterol  1.25 mg Nebulization Q6H    mycophenolate (CELLCEPT) IVPB  500 mg Intravenous BID    polyethylene glycol  17 g Per NG tube Daily    [START ON 6/25/2019] predniSONE  25 mg Per NG tube Daily    [START ON 6/24/2019] predniSONE  35 mg Per NG tube Daily    tacrolimus  1.5 mg Per NG tube BID    vancomycin (VANCOCIN) IVPB  750 mg Intravenous Q12H     PRN Meds:bisacodyl, Dextrose 10% Bolus, Dextrose 10% Bolus, dextrose 50%, dextrose 50%, diphenhydrAMINE, glucagon (human recombinant), glucose, glucose, insulin aspart U-100, lactulose, levalbuterol, levalbuterol, lipase-protease-amylase (VIOKACE) 20,880-78,300- 78,300 units, magnesium sulfate IVPB, metoclopramide HCl, naloxone, ondansetron, ondansetron, oxyCODONE, oxyCODONE, potassium chloride in water **AND** potassium chloride in water **AND** potassium chloride in water, promethazine (PHENERGAN) IVPB    Review of patient's allergies indicates:   Allergen Reactions    Albuterol Palpitations    Colistin Anaphylaxis    Vancomycin analogues      Infusion reaction that does not resolve with slowing  Pt. States she can tolerate it when given with 50 mg Benadryl and ran over 3 hours     Neupogen [filgrastim] Other (See Comments)     Ostealgia after five daily doses of 300 mcg.      Bactrim [sulfamethoxazole-trimethoprim] Hives    Ceftazidime Hives     Pt stated can tolerate cefapine not ceftazidime    Ceftazidime     Dronabinol Other (See Comments)     Mental changes/hallucinations    Haldol [haloperidol lactate] Other (See Comments)     Seizure like activity    Nsaids (non-steroidal anti-inflammatory drug)      Cannot have due to lung transplant    Adhesive Rash     Cloth tape- please use tegaderm or paper tape    Aztreonam Rash    Ciprofloxacin Nausea And Vomiting     Projectile N/V, per patient.  Unwilling to retry therapy.       Review of Systems   Unable to perform ROS: Intubated     Objective:   Physical Exam   Constitutional: She appears well-developed and well-nourished. No distress. She is sedated and intubated.   HENT:   Head: Normocephalic and atraumatic.   Right Ear: External ear normal.   Left Ear: External ear normal.   Nose: Nose normal.   ETT and OGT   Eyes: Conjunctivae and EOM are normal.   Neck: Neck supple. No JVD present.   Right IJ CVC with dressing c/d/i; right subclavian port with dressing c/d/i   Cardiovascular: Normal rate and regular rhythm. Exam reveals friction rub.   Murmur heard.  Pulmonary/Chest: She is intubated. No respiratory distress. She has decreased breath sounds in the right middle field, the right lower field and the left lower field. She has no wheezes. She has rhonchi. She has rales in the right upper field and the left upper field.   Bilateral pleural and mediastinal chest tubes with sanguinous output, bilateral airleak   Abdominal: Bowel sounds are decreased. She exhibits distension. There is no tenderness.   Protuberant abdomen   Genitourinary:   Genitourinary Comments: Goldberg to gravity   Musculoskeletal: She exhibits no edema.   Skin: Skin is warm and dry. She is not diaphoretic. There is pallor.   Psychiatric: Able to communicate via  writing this morning. Remains intubated and sedated.   Nursing note and vitals reviewed.        Vital Signs (Most Recent):  Temp: 96.8 °F (36 °C) (06/23/19 1314)  Pulse: 69 (06/23/19 1314)  Resp: 20 (06/23/19 1314)  BP: (!) 145/101 (06/23/19 1314)  SpO2: 100 % (06/23/19 1314) Vital Signs (24h Range):  Temp:  [95.5 °F (35.3 °C)-98.8 °F (37.1 °C)] 96.8 °F (36 °C)  Pulse:  [64-83] 69  Resp:  [10-34] 20  SpO2:  [98 %-100 %] 100 %  BP: (114-173)/() 145/101     Weight: 65 kg (143 lb 4.8 oz)  Body mass index is 27.08 kg/m².      Intake/Output Summary (Last 24 hours) at 6/23/2019 1346  Last data filed at 6/23/2019 1200  Gross per 24 hour   Intake 4209 ml   Output 4360 ml   Net -151 ml       Ventilator Data:     Vent Mode: A/C  Oxygen Concentration (%):  [40-41] 40  Resp Rate Total:  [20 br/min-32 br/min] 22 br/min  PEEP/CPAP:  [8 cmH20] 8 cmH20  Mean Airway Pressure:  [13 cmH20-15 cmH20] 14 cmH20    Hemodynamic Parameters:       Lines/Drains:       Introducer right internal jugular (Active)   Specific Qualities Other (Comment) 6/22/2019  7:15 AM   Dressing Status Biopatch in place;Clean;Dry;Intact 6/22/2019  7:15 AM   Dressing Intervention Other (Comment) 6/22/2019  7:15 AM   Dressing Change Due 06/25/19 6/22/2019  7:15 AM   Daily Line Review Performed 6/22/2019  7:15 AM   Number of days:             Port A Cath Single Lumen 06/24/14 1200 right subclavian (Active)   Accessed by: Radha Ortega RN 6/17/2019  8:00 PM   Dressing Type Transparent 6/22/2019  7:15 AM   Dressing Status Biopatch in place;Clean;Dry;Intact 6/22/2019  7:15 AM   Dressing Intervention Other (Comment) 6/22/2019  7:15 AM   Dressing Change Due 06/24/19 6/22/2019  7:15 AM   Line Status Saline locked 6/22/2019  7:15 AM   Flush Performed No 6/22/2019  7:15 AM   Date to be Reflushed 06/18/19 6/17/2019  8:00 PM   Daily Line Review Performed 6/22/2019  7:15 AM   Type of Needle Nicole 6/22/2019  3:00 AM   Gauge 20 6/22/2019  3:00 AM   Needle Length 1 in  6/22/2019  3:00 AM   Needle Status Left in place 6/22/2019  3:00 AM   Needle Insertion Date 06/17/19 6/22/2019  3:00 AM   Needle Insertion Time 1900 6/17/2019  8:00 PM   Number of days: 1824            Percutaneous Central Line Insertion/Assessment - double lumen  06/18/19 0510 (Active)   Dressing biopatch in place;dressing dry and intact 6/22/2019  7:15 AM   Securement secured w/ sutures 6/22/2019  7:15 AM   Additional Site Signs no drainage;no streak formation;no palpable cord;no pain;no edema;no warmth;no erythema 6/22/2019  7:15 AM   Distal Patency/Care infusing 6/22/2019  7:15 AM   Proximal Patency/Care infusing 6/22/2019  7:15 AM   Waveform normal 6/22/2019  3:00 AM   Line Interventions line leveled/zeroed 6/22/2019  3:00 AM   Dressing Change Due 06/28/19 6/22/2019  7:15 AM   Daily Line Review Performed 6/22/2019  7:15 AM   Number of days: 4            Percutaneous Central Line Insertion/Assessment - triple lumen  right internal jugular (Active)   Dressing biopatch in place;dressing dry and intact 6/22/2019  7:15 AM   Securement secured w/ sutures 6/22/2019  7:15 AM   Additional Site Signs no drainage;no streak formation;no palpable cord;no pain;no edema;no warmth;no erythema 6/22/2019  7:15 AM   Distal Patency/Care infusing 6/22/2019  7:15 AM   Medial Patency/Care infusing 6/22/2019  7:15 AM   Proximal Patency/Care infusing 6/22/2019  7:15 AM   Waveform normal 6/22/2019  3:00 AM   Line Interventions line leveled/zeroed 6/22/2019  3:00 AM   Dressing Change Due 06/28/19 6/22/2019  7:15 AM   Daily Line Review Performed 6/22/2019  7:15 AM   Number of days:             NG/OG Tube 06/21/19 1715 Center mouth (Active)   Placement Check placement verified by x-ray;placement verified by aspirate characteristics 6/22/2019  7:15 AM   Tolerance no signs/symptoms of discomfort 6/22/2019  7:15 AM   Securement secured to commercial device 6/22/2019  7:15 AM   Clamp Status/Tolerance unclamped 6/22/2019  7:15 AM   Suction  "Setting/Drainage Method suction at;low;intermittent setting 6/22/2019  7:15 AM   Insertion Site Appearance no redness, warmth, tenderness, skin breakdown, drainage 6/22/2019  7:15 AM   Drainage Brown;Clear 6/22/2019  7:15 AM   Flush/Irrigation flushed w/;water;no resistance met 6/22/2019  7:15 AM   Intake (mL) 150 mL 6/22/2019  9:00 AM   Tube Output(mL)(Include Discarded Residual) 0 mL 6/22/2019  7:00 AM   Number of days: 0            Urethral Catheter 06/18/19 0504 Non-latex;Straight-tip;Temperature probe 14 Fr. (Active)   Site Assessment Clean;Intact 6/22/2019  7:15 AM   Collection Container Urimeter 6/22/2019  7:15 AM   Securement Method secured to top of thigh w/ adhesive device 6/22/2019  7:15 AM   Catheter Care Performed yes 6/22/2019  7:15 AM   Reason for Continuing Urinary Catheterization Critically ill in ICU requiring intensive monitoring;Post operative 6/22/2019  7:15 AM   CAUTI Prevention Bundle StatLock in place w 1" slack;Intact seal between catheter & drainage tubing;Green sheeting clip in use;Drainage bag off the floor;No dependent loops or kinks;Drainage bag not overfilled (<2/3 full);Drainage bag below bladder 6/22/2019  7:15 AM   Output (mL) 225 mL 6/22/2019  1:00 PM   Number of days: 4            Y Chest Tube 1 and 2 06/18/19 1151 1 Right Mediastinal 19 Fr. 2 Right Pleural 19 Fr. (Active)   Function -20 cm H2O 6/22/2019  7:15 AM   Air Leak/Fluctuation air leak not present;fluctuation not present 6/22/2019  7:15 AM   Safety all tubing connections taped;2 rubber-tipped hemostats w/ patient;all connections secured;suction checked 6/22/2019  7:15 AM   Securement tubing secured to body distal to insertion site w/ tape 6/22/2019  7:15 AM   Left Subcutaneous Emphysema none present 6/22/2019  7:15 AM   Right Subcutaneous Emphysema none present 6/22/2019  7:15 AM   Patency Intervention Tip/tilt 6/22/2019  7:15 AM   Drainage Description 1 Serosanguineous 6/22/2019  7:15 AM   Tube 1 Dressing Appearance " occlusive gauze dressing intact;clean and dry 6/22/2019  7:15 AM   Tube 1 Dressing Care dressing changed 6/21/2019  3:15 PM   Site Assessment 1 Not assessed;Other (Comment) 6/22/2019  7:15 AM   Surrounding Skin 1 Dry;Intact 6/22/2019  7:15 AM   Drainage Description 2 Serosanguineous 6/22/2019  7:15 AM   Tube 2 Dressing Appearance occlusive gauze dressing intact;clean and dry 6/22/2019  7:15 AM   Tube 2 Dressing Care dressing changed 6/21/2019  3:15 PM   Site Assessment 2 Not assessed;Other (Comment) 6/22/2019  7:15 AM   Surrounding Skin Unable to view 6/22/2019  7:15 AM   Output (mL) 0 mL 6/22/2019 11:00 AM   Number of days: 4            Y Chest Tube 3 and 4 06/18/19 1151 3 Left Pleural 19 Fr. 4 Left Mediastinal 19 Fr. (Active)   Function -20 cm H2O 6/22/2019  7:15 AM   Air Leak/Fluctuation air leak present;fluctuation present;connections tightened 6/22/2019  7:15 AM   Safety all connections secured;all tubing connections taped;2 rubber-tipped hemostats w/ patient;suction checked 6/22/2019  7:15 AM   Securement tubing secured to body distal to insertion site w/ tape 6/22/2019  7:15 AM   Left Subcutaneous Emphysema none present 6/22/2019  7:15 AM   Right Subcutaneous Emphysema none present 6/22/2019  7:15 AM   Patency Intervention Tip/tilt 6/22/2019  7:15 AM   Drainage Description 3 Serosanguineous 6/22/2019  7:15 AM   Tube 3 Dressing Appearance occlusive gauze dressing intact;clean and dry 6/22/2019  7:15 AM   Tube 3 Dressing Care dressing reinforced 6/22/2019  7:15 AM   Site Assessment 3 Not assessed;Other (Comment) 6/22/2019  7:15 AM   Surrounding Skin 3 Unable to view 6/22/2019  7:15 AM   Drainage Description 4 Serosanguineous 6/22/2019  7:15 AM   Tube 4 Dressing Appearance occlusive gauze dressing intact;clean and dry 6/22/2019  7:15 AM   Tube 4 Dressing Care dressing reinforced 6/22/2019  7:15 AM   Site Assessment 4 Not assessed;Other (Comment) 6/22/2019  7:15 AM   Surrounding Skin 4 Dry;Intact 6/22/2019  3:00  AM   Output (mL) 20 mL 6/22/2019 11:00 AM   Number of days: 4       Significant Labs:  CBC:  Recent Labs   Lab 06/23/19  0400   WBC 8.13   RBC 2.66*   HGB 7.3*   HCT 23.1*   *   MCV 87   MCH 27.4   MCHC 31.6*     BMP:  Recent Labs   Lab 06/23/19  0400      K 4.0      CO2 25   BUN 14   CREATININE 0.7   CALCIUM 7.8*      Tacrolimus Levels:  Recent Labs   Lab 06/23/19  0400   TACROLIMUS 1.9*     Microbiology:  Microbiology Results (last 7 days)     Procedure Component Value Units Date/Time    Culture, Respiratory with Gram Stain [[id number]]  (Susceptibility) Collected:  06/20/19 0808    Order Status:  Completed Specimen:  Respiratory from Bronchial Wash Updated:  06/22/19 1018     Respiratory Culture No S aureus isolated.     Respiratory Culture --     PSEUDOMONAS AERUGINOSA   Rare       Gram Stain (Respiratory) <10 epithelial cells per low power field.     Gram Stain (Respiratory) No WBC's     Gram Stain (Respiratory) No organisms seen    AFB Culture & Smear [[id number]] Collected:  06/20/19 0808    Order Status:  Completed Specimen:  Respiratory from Bronchial Wash Updated:  06/21/19 2127     AFB Culture & Smear Culture in progress     AFB CULTURE STAIN No acid fast bacilli seen.    Fungus culture [[id number]]     Order Status:  Canceled Specimen:  Respiratory from Bronchial Wash     Culture, Respiratory with Gram Stain [[id number]]     Order Status:  Canceled Specimen:  Respiratory from Bronchial Wash     AFB Culture & Smear [[id number]]     Order Status:  Canceled Specimen:  Respiratory from Bronchial Wash     Aerobic culture [[id number]] Collected:  06/18/19 0930    Order Status:  Completed Specimen:  Tissue from Lung, Left Updated:  06/20/19 1323     Aerobic Bacterial Culture --     CANDIDA ALBICANS  Few  No other significant isolate      Narrative:       Donor lung    Culture, Anaerobe [[id number]] Collected:  06/18/19 0930    Order Status:  Completed Specimen:  Tissue from Lung, Left Updated:   06/20/19 1040     Anaerobic Culture Culture in progress    Narrative:       Donor lung    Fungus culture [[id number]] Collected:  06/20/19 0808    Order Status:  Sent Specimen:  Respiratory from Bronchial Wash Updated:  06/20/19 0936    AFB Culture & Smear [[id number]] Collected:  06/18/19 0930    Order Status:  Completed Specimen:  Tissue from Lung, Left Updated:  06/19/19 2127     AFB Culture & Smear Culture in progress     AFB CULTURE STAIN No acid fast bacilli seen.    Narrative:       Donor lung    Urine culture [[id number]] Collected:  06/17/19 1753    Order Status:  Completed Specimen:  Urine, Clean Catch Updated:  06/19/19 0117     Urine Culture, Routine No significant growth    Narrative:       Indicated criteria for high risk culture:->Other  Other (specify):->awaiting lung transplant    Gram stain [[id number]] Collected:  06/18/19 0930    Order Status:  Completed Specimen:  Tissue from Lung, Left Updated:  06/18/19 1041     Gram Stain Result Moderate WBC's      No organisms seen    Narrative:       Donor lung    Fungus culture [[id number]] Collected:  06/18/19 0930    Order Status:  Sent Specimen:  Tissue from Lung, Left Updated:  06/18/19 0944          I have reviewed all pertinent labs within the past 24 hours.    Diagnostic Results:  Chest X-Ray: I personally reviewed the films and findings are: increased opacification in the right middle and lower lung fields    No Further  Objective:   Physical Exam   Constitutional: She appears well-developed and well-nourished. No distress. She is sedated and intubated.   HENT:   Head: Normocephalic and atraumatic.   Right Ear: External ear normal.   Left Ear: External ear normal.   Nose: Nose normal.   ETT and OGT   Eyes: Conjunctivae and EOM are normal.   Neck: Neck supple. No JVD present.   Right IJ CVC with dressing c/d/i; right subclavian port with dressing c/d/i   Cardiovascular: Normal rate and regular rhythm. Exam reveals friction rub.   Murmur  heard.  Pulmonary/Chest: She is intubated. No respiratory distress. She has decreased breath sounds in the right middle field, the right lower field and the left lower field. She has no wheezes. She has rhonchi.   Bilateral pleural and mediastinal chest tubes with sanguinous output, bilateral airleak   Abdominal: She exhibits distension. Bowel sounds are decreased. There is no tenderness.   Protuberant abdomen   Genitourinary:   Genitourinary Comments: Goldberg to gravity   Musculoskeletal: She exhibits no edema.   Skin: Skin is warm and dry. She is not diaphoretic. There is pallor.   Nursing note and vitals reviewed.        Vital Signs (Most Recent):  Temp: 96.8 °F (36 °C) (06/23/19 1314)  Pulse: 69 (06/23/19 1314)  Resp: 20 (06/23/19 1314)  BP: (!) 145/101 (06/23/19 1314)  SpO2: 100 % (06/23/19 1314) Vital Signs (24h Range):  Temp:  [95.5 °F (35.3 °C)-98.8 °F (37.1 °C)] 96.8 °F (36 °C)  Pulse:  [64-83] 69  Resp:  [10-34] 20  SpO2:  [98 %-100 %] 100 %  BP: (114-173)/() 145/101     Weight: 65 kg (143 lb 4.8 oz)  Body mass index is 27.08 kg/m².      Intake/Output Summary (Last 24 hours) at 6/23/2019 1346  Last data filed at 6/23/2019 1200  Gross per 24 hour   Intake 4209 ml   Output 4360 ml   Net -151 ml       Ventilator Data:     Vent Mode: A/C  Oxygen Concentration (%):  [40-41] 40  Resp Rate Total:  [20 br/min-32 br/min] 22 br/min  PEEP/CPAP:  [8 cmH20] 8 cmH20  Mean Airway Pressure:  [13 cmH20-15 cmH20] 14 cmH20    Hemodynamic Parameters:       Lines/Drains:       Introducer right internal jugular (Active)   Specific Qualities Other (Comment) 6/22/2019  7:15 AM   Dressing Status Biopatch in place;Clean;Dry;Intact 6/22/2019  7:15 AM   Dressing Intervention Other (Comment) 6/22/2019  7:15 AM   Dressing Change Due 06/25/19 6/22/2019  7:15 AM   Daily Line Review Performed 6/22/2019  7:15 AM   Number of days:             Port A Cath Single Lumen 06/24/14 1200 right subclavian (Active)   Accessed by: Radha  SUSAN Ortega 6/17/2019  8:00 PM   Dressing Type Transparent 6/22/2019  7:15 AM   Dressing Status Biopatch in place;Clean;Dry;Intact 6/22/2019  7:15 AM   Dressing Intervention Other (Comment) 6/22/2019  7:15 AM   Dressing Change Due 06/24/19 6/22/2019  7:15 AM   Line Status Saline locked 6/22/2019  7:15 AM   Flush Performed No 6/22/2019  7:15 AM   Date to be Reflushed 06/18/19 6/17/2019  8:00 PM   Daily Line Review Performed 6/22/2019  7:15 AM   Type of Needle Nicole 6/22/2019  3:00 AM   Gauge 20 6/22/2019  3:00 AM   Needle Length 1 in 6/22/2019  3:00 AM   Needle Status Left in place 6/22/2019  3:00 AM   Needle Insertion Date 06/17/19 6/22/2019  3:00 AM   Needle Insertion Time 1900 6/17/2019  8:00 PM   Number of days: 1824            Percutaneous Central Line Insertion/Assessment - double lumen  06/18/19 0510 (Active)   Dressing biopatch in place;dressing dry and intact 6/22/2019  7:15 AM   Securement secured w/ sutures 6/22/2019  7:15 AM   Additional Site Signs no drainage;no streak formation;no palpable cord;no pain;no edema;no warmth;no erythema 6/22/2019  7:15 AM   Distal Patency/Care infusing 6/22/2019  7:15 AM   Proximal Patency/Care infusing 6/22/2019  7:15 AM   Waveform normal 6/22/2019  3:00 AM   Line Interventions line leveled/zeroed 6/22/2019  3:00 AM   Dressing Change Due 06/28/19 6/22/2019  7:15 AM   Daily Line Review Performed 6/22/2019  7:15 AM   Number of days: 4            Percutaneous Central Line Insertion/Assessment - triple lumen  right internal jugular (Active)   Dressing biopatch in place;dressing dry and intact 6/22/2019  7:15 AM   Securement secured w/ sutures 6/22/2019  7:15 AM   Additional Site Signs no drainage;no streak formation;no palpable cord;no pain;no edema;no warmth;no erythema 6/22/2019  7:15 AM   Distal Patency/Care infusing 6/22/2019  7:15 AM   Medial Patency/Care infusing 6/22/2019  7:15 AM   Proximal Patency/Care infusing 6/22/2019  7:15 AM   Waveform normal 6/22/2019  3:00 AM  "  Line Interventions line leveled/zeroed 6/22/2019  3:00 AM   Dressing Change Due 06/28/19 6/22/2019  7:15 AM   Daily Line Review Performed 6/22/2019  7:15 AM   Number of days:             NG/OG Tube 06/21/19 1715 Center mouth (Active)   Placement Check placement verified by x-ray;placement verified by aspirate characteristics 6/22/2019  7:15 AM   Tolerance no signs/symptoms of discomfort 6/22/2019  7:15 AM   Securement secured to commercial device 6/22/2019  7:15 AM   Clamp Status/Tolerance unclamped 6/22/2019  7:15 AM   Suction Setting/Drainage Method suction at;low;intermittent setting 6/22/2019  7:15 AM   Insertion Site Appearance no redness, warmth, tenderness, skin breakdown, drainage 6/22/2019  7:15 AM   Drainage Brown;Clear 6/22/2019  7:15 AM   Flush/Irrigation flushed w/;water;no resistance met 6/22/2019  7:15 AM   Intake (mL) 150 mL 6/22/2019  9:00 AM   Tube Output(mL)(Include Discarded Residual) 0 mL 6/22/2019  7:00 AM   Number of days: 0            Urethral Catheter 06/18/19 0504 Non-latex;Straight-tip;Temperature probe 14 Fr. (Active)   Site Assessment Clean;Intact 6/22/2019  7:15 AM   Collection Container Urimeter 6/22/2019  7:15 AM   Securement Method secured to top of thigh w/ adhesive device 6/22/2019  7:15 AM   Catheter Care Performed yes 6/22/2019  7:15 AM   Reason for Continuing Urinary Catheterization Critically ill in ICU requiring intensive monitoring;Post operative 6/22/2019  7:15 AM   CAUTI Prevention Bundle StatLock in place w 1" slack;Intact seal between catheter & drainage tubing;Green sheeting clip in use;Drainage bag off the floor;No dependent loops or kinks;Drainage bag not overfilled (<2/3 full);Drainage bag below bladder 6/22/2019  7:15 AM   Output (mL) 225 mL 6/22/2019  1:00 PM   Number of days: 4            Y Chest Tube 1 and 2 06/18/19 1151 1 Right Mediastinal 19 Fr. 2 Right Pleural 19 Fr. (Active)   Function -20 cm H2O 6/22/2019  7:15 AM   Air Leak/Fluctuation air leak not " present;fluctuation not present 6/22/2019  7:15 AM   Safety all tubing connections taped;2 rubber-tipped hemostats w/ patient;all connections secured;suction checked 6/22/2019  7:15 AM   Securement tubing secured to body distal to insertion site w/ tape 6/22/2019  7:15 AM   Left Subcutaneous Emphysema none present 6/22/2019  7:15 AM   Right Subcutaneous Emphysema none present 6/22/2019  7:15 AM   Patency Intervention Tip/tilt 6/22/2019  7:15 AM   Drainage Description 1 Serosanguineous 6/22/2019  7:15 AM   Tube 1 Dressing Appearance occlusive gauze dressing intact;clean and dry 6/22/2019  7:15 AM   Tube 1 Dressing Care dressing changed 6/21/2019  3:15 PM   Site Assessment 1 Not assessed;Other (Comment) 6/22/2019  7:15 AM   Surrounding Skin 1 Dry;Intact 6/22/2019  7:15 AM   Drainage Description 2 Serosanguineous 6/22/2019  7:15 AM   Tube 2 Dressing Appearance occlusive gauze dressing intact;clean and dry 6/22/2019  7:15 AM   Tube 2 Dressing Care dressing changed 6/21/2019  3:15 PM   Site Assessment 2 Not assessed;Other (Comment) 6/22/2019  7:15 AM   Surrounding Skin Unable to view 6/22/2019  7:15 AM   Output (mL) 0 mL 6/22/2019 11:00 AM   Number of days: 4            Y Chest Tube 3 and 4 06/18/19 1151 3 Left Pleural 19 Fr. 4 Left Mediastinal 19 Fr. (Active)   Function -20 cm H2O 6/22/2019  7:15 AM   Air Leak/Fluctuation air leak present;fluctuation present;connections tightened 6/22/2019  7:15 AM   Safety all connections secured;all tubing connections taped;2 rubber-tipped hemostats w/ patient;suction checked 6/22/2019  7:15 AM   Securement tubing secured to body distal to insertion site w/ tape 6/22/2019  7:15 AM   Left Subcutaneous Emphysema none present 6/22/2019  7:15 AM   Right Subcutaneous Emphysema none present 6/22/2019  7:15 AM   Patency Intervention Tip/tilt 6/22/2019  7:15 AM   Drainage Description 3 Serosanguineous 6/22/2019  7:15 AM   Tube 3 Dressing Appearance occlusive gauze dressing intact;clean and  dry 6/22/2019  7:15 AM   Tube 3 Dressing Care dressing reinforced 6/22/2019  7:15 AM   Site Assessment 3 Not assessed;Other (Comment) 6/22/2019  7:15 AM   Surrounding Skin 3 Unable to view 6/22/2019  7:15 AM   Drainage Description 4 Serosanguineous 6/22/2019  7:15 AM   Tube 4 Dressing Appearance occlusive gauze dressing intact;clean and dry 6/22/2019  7:15 AM   Tube 4 Dressing Care dressing reinforced 6/22/2019  7:15 AM   Site Assessment 4 Not assessed;Other (Comment) 6/22/2019  7:15 AM   Surrounding Skin 4 Dry;Intact 6/22/2019  3:00 AM   Output (mL) 20 mL 6/22/2019 11:00 AM   Number of days: 4       Significant Labs:  CBC:  Recent Labs   Lab 06/23/19  0400   WBC 8.13   RBC 2.66*   HGB 7.3*   HCT 23.1*   *   MCV 87   MCH 27.4   MCHC 31.6*     BMP:  Recent Labs   Lab 06/23/19  0400      K 4.0      CO2 25   BUN 14   CREATININE 0.7   CALCIUM 7.8*      Tacrolimus Levels:  Recent Labs   Lab 06/23/19  0400   TACROLIMUS 1.9*     Microbiology:  Microbiology Results (last 7 days)     Procedure Component Value Units Date/Time    Culture, Respiratory with Gram Stain [[id number]]  (Susceptibility) Collected:  06/20/19 0808    Order Status:  Completed Specimen:  Respiratory from Bronchial Wash Updated:  06/22/19 1018     Respiratory Culture No S aureus isolated.     Respiratory Culture --     PSEUDOMONAS AERUGINOSA   Rare       Gram Stain (Respiratory) <10 epithelial cells per low power field.     Gram Stain (Respiratory) No WBC's     Gram Stain (Respiratory) No organisms seen    AFB Culture & Smear [[id number]] Collected:  06/20/19 0808    Order Status:  Completed Specimen:  Respiratory from Bronchial Wash Updated:  06/21/19 2127     AFB Culture & Smear Culture in progress     AFB CULTURE STAIN No acid fast bacilli seen.    Fungus culture [[id number]]     Order Status:  Canceled Specimen:  Respiratory from Bronchial Wash     Culture, Respiratory with Gram Stain [[id number]]     Order Status:  Canceled  Specimen:  Respiratory from Bronchial Wash     AFB Culture & Smear [[id number]]     Order Status:  Canceled Specimen:  Respiratory from Bronchial Wash     Aerobic culture [[id number]] Collected:  06/18/19 0930    Order Status:  Completed Specimen:  Tissue from Lung, Left Updated:  06/20/19 1323     Aerobic Bacterial Culture --     CANDIDA ALBICANS  Few  No other significant isolate      Narrative:       Donor lung    Culture, Anaerobe [[id number]] Collected:  06/18/19 0930    Order Status:  Completed Specimen:  Tissue from Lung, Left Updated:  06/20/19 1040     Anaerobic Culture Culture in progress    Narrative:       Donor lung    Fungus culture [[id number]] Collected:  06/20/19 0808    Order Status:  Sent Specimen:  Respiratory from Bronchial Wash Updated:  06/20/19 0936    AFB Culture & Smear [[id number]] Collected:  06/18/19 0930    Order Status:  Completed Specimen:  Tissue from Lung, Left Updated:  06/19/19 2127     AFB Culture & Smear Culture in progress     AFB CULTURE STAIN No acid fast bacilli seen.    Narrative:       Donor lung    Urine culture [[id number]] Collected:  06/17/19 1753    Order Status:  Completed Specimen:  Urine, Clean Catch Updated:  06/19/19 0117     Urine Culture, Routine No significant growth    Narrative:       Indicated criteria for high risk culture:->Other  Other (specify):->awaiting lung transplant    Gram stain [[id number]] Collected:  06/18/19 0930    Order Status:  Completed Specimen:  Tissue from Lung, Left Updated:  06/18/19 1041     Gram Stain Result Moderate WBC's      No organisms seen    Narrative:       Donor lung    Fungus culture [[id number]] Collected:  06/18/19 0930    Order Status:  Sent Specimen:  Tissue from Lung, Left Updated:  06/18/19 0944          I have reviewed all pertinent labs within the past 24 hours.    Diagnostic Results:  Chest X-Ray: I personally reviewed the films and findings are: increased opacification in the right middle and lower lung fields    No  Further        Assessment/Plan:     * S/P lung transplant  POD#5 s/p bilateral lung transplant (re-transplant) for CARLOS EDUARDO. 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 Remains PGD 0. S/p bronch and extubated to HFNC on POD#2. Re-intubated on  CTS to manage chest tubes.     - Continue lung protective ventilation. Currently on A/C PC with Pi 18, Rate 20, Ti 0.85, PEEP 8, FiO2 40%. Maintain O2 sats >88%. Continue Xopenex nebs Q6 hrs. Continue immunosuppression and ppx per protocol. Continue Daily CXR. Hold tube feeds at midnight for possible bronchoscopy tomorrow. Plan for possible vent weaning and extubation tomorrow.    Immunosuppression  Previously on tacrolimus, MMF, and daily prednisone. Received solumedrol in OR and basiliximab upon arrival to ICU. Repeat basiliximab on POD#4.   - Continue MMF, tacrolimus, and steroid taper. Tacrolimus increased to 1.5 mg BID today. Monitor daily tacrolimus levels and adjust dose as needed.     Prophylactic antibiotic  - CMV D-/R+. Continue OIP with ganciclovir, fluconazole, and dapsone.   -Previously on Merrem/Vanc for routine surgical ppx based on previous sensitivities.   -Bronchial wash culture from 6/20 with  Pseudomonas. Continue vancomycin. Meropenem d/c. Tobramycin x1 on 6/22. Continue ciprofloxacin 400 mg Q8hrs. ID following, appreciate recs.      Acute blood loss anemia  Received total of 6u PRBCs, 2u plts, 1 cryo, and 4 FFP angie-operatively. Hemodynamically stable today. Will be conservative with future transfusions.     Thrombocytopenia, unspecified  - Expected post-operatively. Continue to monitor on serial CBC.    Adrenal cortical steroids causing adverse effect in therapeutic use  Endocrinology consulted, appreciate recs.    CF related Pancreatic insufficiency  - Begin Viokace enzymes with trickle tube feedings today. Hold VioKace enzyme supplementation when tube feeds are held.    Chronic pain with opiate use  - Anesthesia consulted for pain management. Continue  aggressive bowel regimen.   - Previously on ketamine 25 infusion. Discussed with anesthesia staff.. Will try to transition to ketamine 5 drip and hydromorphone PCA once ready to extubate.   - Continue Q12 Bupivacaine in TARA catheters.       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 Meropenem d/c. Ciprofloxacin 400 mg Q8hrs started. Tobramycin x1 on 6/22. ID following, appreciate recs.     Steroid-induced hyperglycemia  - Endocrine consulted.  Currently on Insulin gtt.  Goal BG <160        Preventive Measures: Nutrition: Goal: tube feeds, Stress Ulcer: continue prophyllaxis, DVT: continue prophyllaxis    Counseling/Consultation:I discussed the case with Dr. Morin., I discussed the patient's condition/prognosis with the family.        Jacqueline Dumont PA-C  Lung Transplant  [facility]

## 2019-06-23 NOTE — SUBJECTIVE & OBJECTIVE
"Interval HPI:   Overnight events:  Remains in ICU, intubated and sedated. BG above goal on insulin infusion at 0.8 u/hr plus correction scale. Prednisone 50 mg daily; steroid taper per primary. Dexamethasone x1 this AM.   Eating:   NPO  Nausea: No  Hypoglycemia and intervention: No  Fever: No  TF: impact peptide at 20 cc/hr       BP (!) 142/99   Pulse 69   Temp 96.4 °F (35.8 °C)   Resp 20   Ht 5' 1" (1.549 m)   Wt 65 kg (143 lb 4.8 oz)   LMP 10/02/2016   SpO2 100%   Breastfeeding? No   BMI 27.08 kg/m²     Labs Reviewed and Include    Recent Labs   Lab 06/23/19  0400   *   CALCIUM 7.8*   ALBUMIN 2.2*   PROT 5.7*      K 4.0   CO2 25      BUN 14   CREATININE 0.7   ALKPHOS 134   ALT 27   AST 36   BILITOT 0.3     Lab Results   Component Value Date    WBC 8.13 06/23/2019    HGB 7.3 (L) 06/23/2019    HCT 23.1 (L) 06/23/2019    MCV 87 06/23/2019     (L) 06/23/2019     No results for input(s): TSH, FREET4 in the last 168 hours.  Lab Results   Component Value Date    HGBA1C 4.7 01/09/2019       Nutritional status:   Body mass index is 27.08 kg/m².  Lab Results   Component Value Date    ALBUMIN 2.2 (L) 06/23/2019    ALBUMIN 2.3 (L) 06/22/2019    ALBUMIN 2.3 (L) 06/22/2019     Lab Results   Component Value Date    PREALBUMIN 15 (L) 05/29/2014    PREALBUMIN 11 (L) 05/09/2014    PREALBUMIN 18 (L) 03/19/2014       Estimated Creatinine Clearance: 101.5 mL/min (based on SCr of 0.7 mg/dL).    Accu-Checks  Recent Labs     06/21/19  1145 06/21/19  1550 06/22/19  0001 06/22/19  0408 06/22/19  0801 06/22/19  1158 06/22/19  1610 06/22/19  2014 06/23/19  0000 06/23/19  0432   POCTGLUCOSE 197* 176* 256* 211* 179* 188* 252* 278* 274* 276*       Current Medications and/or Treatments Impacting Glycemic Control  Immunotherapy:    Immunosuppressants         Stop Route Frequency     tacrolimus (PROGRAF) 1 mg/mL oral syringe      -- PER NG TUBE 2 times daily     tacrolimus (PROGRAF) 1 mg/mL oral syringe      -- " PER NG TUBE Once     mycophenolate (CELLCEPT) 500 mg in dextrose 5 % 100 mL IVPB      -- IV 2 times daily        Steroids:   Hormones (From admission, onward)    Start     Stop Route Frequency Ordered    06/25/19 0900  predniSONE tablet 25 mg      06/26 0859 PER NG TUBE Daily 06/22/19 0918    06/24/19 0900  predniSONE tablet 35 mg      06/25 0859 PER NG TUBE Daily 06/22/19 0916    06/23/19 0900  predniSONE tablet 50 mg      06/24 0859 PER NG TUBE Daily 06/22/19 0914        Pressors:    Autonomic Drugs (From admission, onward)    None        Hyperglycemia/Diabetes Medications:   Antihyperglycemics (From admission, onward)    Start     Stop Route Frequency Ordered    06/20/19 1545  insulin regular (Humulin R) 100 Units in sodium chloride 0.9% 100 mL infusion      -- IV Continuous 06/20/19 1442    06/20/19 1542  insulin aspart U-100 pen 0-4 Units      -- SubQ As needed (PRN) 06/20/19 1442

## 2019-06-23 NOTE — SIGNIFICANT EVENT
Called this afternoon about acute desaturations on current vent settings.  Minimally improved with neb treatment and CPT.  Stat CXR showed worsening bilateral pneumothoraces despite CTs remaining to suction.  CTS to bedside and was able to remove a blood clot from the left sided CT with worsening of the already present air leak.  The right sided CT was stripped and resulted in a new air leak compared to no leak this am.  Bedside bronchoscopy preformed and confirmed that bilateral anastamoses were both intact.  Patient placed back on previous vent settings at PC Pi 18, RR 20, PEEP 8, and FiO2 50%.  Maintaining TVs at 400 and SaO2 100% on the monitor.  Will repeat CXR in 30mins; I suspect it will be improved following chest tube manipulation.

## 2019-06-23 NOTE — ASSESSMENT & PLAN NOTE
BG goal 140 - 180.       continue insulin infusion at 1.2  u/hr.   BG monitoring every 4 hours and low dose correction scale.

## 2019-06-23 NOTE — PROGRESS NOTES
[facility]  Infectious Disease  Progress Note    Patient Name: Juanita Ibarra  MRN: [id number]  Admission Date: 6/17/2019  Length of Stay: 5 days  Attending Physician: Francisca Morin DO  Primary Care Provider: Primary Doctor Beth    Isolation Status: Contact  Assessment/Plan:      * S/P lung transplant  29-year-old female with history of CF s/p bilateral sequential lung transplant 6/2014 c/b CARLOS EDUARDO and end stage lung disease, admitted 6/18/2019 for bilateral lung re-transplant (CMV D-/R+, basiliximab induction, on tacro/MMF/pred).  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  Patient with history of MRSA and Pseudomonas pneumonia - ID consulted for antibiotic recommendations. Now with PsA PCR + from BAL and PsA growing and is resistant to meropenem     Recommendations:  - consider stopping vancomycin,stop  meropenem IV and started ciprofloxacin - recommend 2 week course  - Prophylaxis per lung transplant protocol - on dapsone, fluconazole, ganciclovir  - will sign off for now call back if questions or new infectious issues arise        Anticipated Disposition: pending    Thank you for your consult. I will sign off. Please contact us if you have any additional questions.    Dannie Juan MD  Infectious Disease  [facility]    Subjective:     Principal Problem:S/P lung transplant    HPI: 29-year-old female with history of CF s/p bilateral sequential lung transplant 6/2014 c/b CARLOS EDUARDO and end stage lung disease, admitted 6/18/2019 for bilateral lung re-transplant (CMV D-/R+, basiliximab induction, on tacro/MMF/pred).  ID consulted for antibiotic recommendations.  Patient currently on vanc / beatrice based on prior culture results.  Interval History:much improved remains intubated but interactive.  PsA R to meropenem changed to ciprofloxacin    Review of Systems   Unable to perform ROS: Intubated     Objective:      Vital Signs (Most Recent):  Temp: 96.8 °F (36 °C) (06/23/19 1314)  Pulse: 69 (06/23/19 1314)  Resp: 20 (06/23/19 1314)  BP: (!) 145/101 (06/23/19 1314)  SpO2: 100 % (06/23/19 1314) Vital Signs (24h Range):  Temp:  [95.5 °F (35.3 °C)-98.8 °F (37.1 °C)] 96.8 °F (36 °C)  Pulse:  [64-83] 69  Resp:  [10-34] 20  SpO2:  [98 %-100 %] 100 %  BP: (114-173)/() 145/101     Weight: 65 kg (143 lb 4.8 oz)  Body mass index is 27.08 kg/m².    Estimated Creatinine Clearance: 101.5 mL/min (based on SCr of 0.7 mg/dL).    Physical Exam   Constitutional: She appears well-developed and well-nourished.   HENT:   Head: Normocephalic and atraumatic.   intubated   Eyes: Pupils are equal, round, and reactive to light.   Neck: Normal range of motion. Neck supple.   Cardiovascular: Normal rate.   Pulmonary/Chest: Effort normal and breath sounds normal.   Abdominal: Soft. Bowel sounds are normal.   Musculoskeletal: She exhibits no edema or tenderness.   Neurological: She is alert.   Skin: Skin is warm and dry.   Op dressing chest wall   Psychiatric: She has a normal mood and affect.       Significant Labs:   CBC:   Recent Labs   Lab 06/22/19  0534 06/23/19  0400   WBC 7.82 8.13   HGB 7.6* 7.3*   HCT 23.3* 23.1*   * 115*     CMP:   Recent Labs   Lab 06/22/19  0422 06/22/19  0534 06/23/19  0400   * 138 136   K 3.5 3.8 4.0    103 103   CO2 26 27 25   * 209* 235*   BUN 12 12 14   CREATININE 0.7 0.7 0.7   CALCIUM 8.3* 8.1* 7.8*   PROT 5.4* 5.3* 5.7*   ALBUMIN 2.3* 2.3* 2.2*   BILITOT 0.4 0.4 0.3   ALKPHOS 133 124 134   AST 24 22 36   ALT 21 23 27   ANIONGAP 8 8 8   EGFRNONAA >60.0 >60.0 >60.0       Significant Imaging: I have reviewed all pertinent imaging results/findings within the past 24 hours.

## 2019-06-24 NOTE — PROGRESS NOTES
MARCELLA Alvarez MD and team at bedside.   MD notified of pts current gtts, rates, VS, labs, UOP, CVP, CXR.     Plan to adjust pain/sedation medications.   Plan reviewed with Pain Management.     Plan of care reviewed with pt and spouse.   Questions answered per MD.   See flowsheet for details.

## 2019-06-24 NOTE — ANESTHESIA POST-OP PAIN MANAGEMENT
Bilateral TARA catheters bolused after negative aspiration.  30mL of 0.375% bupivacaine with 1:300k epi, 50mcg clonidine, and 1mg dexamethasone per side for total of 60mL.

## 2019-06-24 NOTE — PROGRESS NOTES
Admission Note:    Juanita Ibarra  is a 29 y.o. female who received a BLT #2 (1st transplant in 6/2014) on 6/18/19 secondary to CF.  The patient received the standard lung transplant immunosuppression which includes methylprednisolone 1 gram IV in the operating room, followed by steroid taper, per protocol.  Basiliximab 20 mg was administered on arrival to the ICU.  Next dose due on POD #4.    Maintenance immunosuppression includes tacrolimus 0.5 mg BID and mycophenolate mofetil 500 mg BID, each will be titrated to goal level or maximum dose.  Opportunistic infection prophylaxis includes valganciclovir 450 mg BID, fluconazole 400 mg daily, and Sulfamethoximazole/Trimethoprim DS QMWF.   Pertinent home medications have been restarted.  Self medication administration will begin when the patient is moved to the transplant step down unit.  Medication education will begin as well.  Will follow with team.     CMV D-/R+    Abx Px:  Meropenem + vancomycin + cipro    Prednisone Taper:  (6/25-7/2):        25 mg daily  (7/3-7/17):        20 mg daily  (7/18-9/15):      15 mg daily   (9/16-10/15):    10 mg daily  (10/16-12/15):   7.5 mg daily  (12/16/15):         5 mg daily

## 2019-06-24 NOTE — PT/OT/SLP PROGRESS
Occupational Therapy      Patient Name:  Juanita Ibarra   MRN:  [id number]    Patient not seen today secondary to on hold per nursing 2* awaiting bronch  . Will follow-up 6/25/19.    LUCIA Foy  6/24/2019

## 2019-06-24 NOTE — ASSESSMENT & PLAN NOTE
Previously on tacrolimus, MMF, and daily prednisone. Received solumedrol in OR and basiliximab upon arrival to ICU. Basiliximab on POD#0 and POD#4.   - Continue MMF, tacrolimus, and steroid taper. Tacrolimus increased to 1.5 mg BID on 6/23. Monitor daily tacrolimus levels and adjust dose as needed.

## 2019-06-24 NOTE — PT/OT/SLP PROGRESS
Speech Language Pathology  Discharge    Juanita Ibarra  MRN: [id number]    Patient not seen today secondary to pt intubated at this time. Please re-consult when medically appropriate. No further ST warranted at this time.       Magaly Irene CCC-SLP  6/24/2019

## 2019-06-24 NOTE — PROGRESS NOTES
"[facility]  Adult Nutrition  Progress Note    SUMMARY       Recommendations  Recommendation/Intervention:   1. When able to extubate, ADAT to Regular with texture per SLP.   2. If unable to extubate, recommend resuming TF:    -If patient to remain on propofol, recommend Peptamen Intense VHP at a goal rate of 45 mL/hr - to provide 1080 kcal/day (1629 kcal w/propofol), 99g protein/day, and 907mL free fluid/day.    -If no longer on propofol, recommend Impact Peptide 1.5 at a goal rate of 45 mL/hr - to provide 1620 kcal/day, 102g protein/day, and 812mL free fluid/day.   3. TSU RD to provide post-transplant diet education prior to discharge.   RD to monitor.    Goals: Patient to receive nutrition by RD follow-up  Nutrition Goal Status: progressing towards goal  Communication of RD Recs: (POC)    Reason for Assessment  Reason For Assessment: RD follow-up  Diagnosis: transplant/postoperative complications(s/p redo BLTx 6/18)  Relevant Medical History: CF s/p BLTx 6/2014, HTN, seizures  Interdisciplinary Rounds: attended  General Information Comments: Extubated 6/20 but needed to be reintubated 6/21. Trickle TF started yesterday. Bronched yesterday, plan for another today so TF held. Physical exam remains unchanged, continues to appear nourished.  Nutrition Discharge Planning: TSU RD to provide post-transplant diet education prior to discharge.    Nutrition Risk Screen  Nutrition Risk Screen: no indicators present    Nutrition/Diet History  Spiritual, Cultural Beliefs, Holiness Practices, Values that Affect Care: no  Factors Affecting Nutritional Intake: NPO, on mechanical ventilation    Anthropometrics  Temp: 98.2 °F (36.8 °C)  Height Method: Stated  Height: 5' 1" (154.9 cm)  Height (inches): 61 in  Weight Method: Bed Scale  Weight: 66.8 kg (147 lb 4.3 oz)  Weight (lb): 147.27 lb  Ideal Body Weight (IBW), Female: 105 lb  % Ideal Body Weight, Female (lb): 123.77 lb  BMI (Calculated): 24.6  BMI Grade: " 18.5-24.9 - normal    Lab/Procedures/Meds  Pertinent Labs Reviewed: reviewed  Pertinent Labs Comments: Glu 132, Ca 8.1, Alb 2.3  Pertinent Medications Reviewed: reviewed  Pertinent Medications Comments: prednisone, prograf, precedex, fentanyl, insulin drip, cardene, propofol    Estimated/Assessed Needs  Weight Used For Calorie Calculations: 66.8 kg (147 lb 4.3 oz)  Energy Calorie Requirements (kcal): 1435 kcal/day  Energy Need Method: [facility]  Protein Requirements:  g/day(1.2-1.5 g/kg)  Weight Used For Protein Calculations: 66.8 kg (147 lb 4.3 oz)  Fluid Requirements (mL): 1 mL/kcal or per MD  Estimated Fluid Requirement Method: RDA Method  RDA Method (mL): 1435    Nutrition Prescription Ordered  Current Diet Order: NPO  Current Nutrition Support Formula Ordered: Impact Peptide 1.5  Current Nutrition Support Rate Ordered: 20 (ml)  Current Nutrition Support Frequency Ordered: mL/hr    Evaluation of Received Nutrient/Fluid Intake  Enteral Calories (kcal): 720  Enteral Protein (gm): 45  Enteral (Free Water) Fluid (mL): 370  Other Calories (kcal): 549(propofol)  Total Calories (kcal): 1269  % Kcal Needs: 88%  % Protein Needs: 56%  I/O: Noted  Energy Calories Required: meeting needs  Protein Required: not meeting needs  Fluid Required: (per MD)  Comments: LBM 6/23  Tolerance: (on hold)  % Intake of Estimated Energy Needs: 75 - 100 %  % Meal Intake: NPO    Nutrition Risk  Level of Risk/Frequency of Follow-up: high(2x/week)     Assessment and Plan  Nutrition Problem  Increased nutrient needs     Related to (etiology):   Physiological causes related to healing     Signs and Symptoms (as evidenced by):   S/p redo BLTx 6/18      Interventions/Recommendations (treatment strategy):  Collaboration and referral of nutrition care     Nutrition Diagnosis Status:   Continues    Monitor and Evaluation  Food and Nutrient Intake: energy intake, enteral nutrition intake  Food and Nutrient Adminstration: enteral and parenteral  nutrition administration  Anthropometric Measurements: weight, weight change  Biochemical Data, Medical Tests and Procedures: electrolyte and renal panel, gastrointestinal profile, inflammatory profile  Nutrition-Focused Physical Findings: overall appearance     Nutrition Follow-Up  RD Follow-up?: Yes

## 2019-06-24 NOTE — SUBJECTIVE & OBJECTIVE
Subjective:     Interval History: Called to bedside yesterday afternoon due to desats to high 80s. STAT CXR with bilateral pneumothoraces despite bilateral mediastinal/pleural tubes remaining to suction. CTS resident at bedside and was able to remove clots/strip chest tubes with new air leak noted in R chamber and increased air leak noted in L chamber. Repeat imaging with no evidence of pneumothorax this morning.     Right and left chest tube chambers with intermittent air leaks this AM and total of 180 cc serosanguinous output yesterday. Switched to SBT 12/5 this AM; however, did not tolerate it well while on 250 mcg of fentanyl. Will plan to begin ketamine infusion today and slowly wean off of fentanyl in anticipation of possible extubation tomorrow.     2-3 BMs reported overnight per RN today following enema. Plan to restart trickle feeds today and continue Miralax BID.    Continuous Infusions:   dexmedetomidine (PRECEDEX) infusion 1.4 mcg/kg/hr (06/24/19 1200)    dextrose 5 % and 0.45 % NaCl with KCl 20 mEq 5 mL/hr at 06/24/19 1200    fentanyl 200 mcg/hr (06/24/19 1200)    hydromorphone in 0.9 % NaCl 6 mg/30 ml      insulin (HUMAN R) infusion (adults) 1.2 Units/hr (06/24/19 1200)    ketamine (KETALAR) infusion 5 mcg/kg/min (06/24/19 1100)    nicardipine Stopped (06/24/19 0200)    propofol 50 mcg/kg/min (06/24/19 1200)     Scheduled Meds:   ciprofloxacin  400 mg Intravenous Q8H    enoxaparin  40 mg Subcutaneous Daily    fluconazole (DIFLUCAN) IVPB  400 mg Intravenous Q24H    gabapentin  250 mg Per NG tube Q8H    ganciclovir (CYTOVENE) IVPB  5 mg/kg Intravenous Q12H    levalbuterol  1.25 mg Nebulization Q6H    mycophenolate (CELLCEPT) IVPB  500 mg Intravenous BID    polyethylene glycol  17 g Per NG tube BID    [START ON 6/25/2019] predniSONE  25 mg Per OG tube Daily    Followed by    [START ON 7/3/2019] predniSONE  20 mg Oral Daily    tacrolimus  1.5 mg Per NG tube BID    vancomycin (VANCOCIN)  IVPB  750 mg Intravenous Q12H     PRN Meds:bisacodyl, Dextrose 10% Bolus, Dextrose 10% Bolus, dextrose 50%, dextrose 50%, diphenhydrAMINE, glucagon (human recombinant), glucose, glucose, insulin aspart U-100, lactulose, levalbuterol, levalbuterol, lipase-protease-amylase (VIOKACE) 20,880-78,300- 78,300 units, magnesium sulfate IVPB, metoclopramide HCl, naloxone, ondansetron, ondansetron, oxyCODONE, oxyCODONE, potassium chloride in water **AND** potassium chloride in water **AND** potassium chloride in water, promethazine (PHENERGAN) IVPB    Review of patient's allergies indicates:   Allergen Reactions    Albuterol Palpitations    Colistin Anaphylaxis    Vancomycin analogues      Infusion reaction that does not resolve with slowing  Pt. States she can tolerate it when given with 50 mg Benadryl and ran over 3 hours    Neupogen [filgrastim] Other (See Comments)     Ostealgia after five daily doses of 300 mcg.      Bactrim [sulfamethoxazole-trimethoprim] Hives    Ceftazidime Hives     Pt stated can tolerate cefapine not ceftazidime    Ceftazidime     Dronabinol Other (See Comments)     Mental changes/hallucinations    Haldol [haloperidol lactate] Other (See Comments)     Seizure like activity    Nsaids (non-steroidal anti-inflammatory drug)      Cannot have due to lung transplant    Adhesive Rash     Cloth tape- please use tegaderm or paper tape    Aztreonam Rash    Ciprofloxacin Nausea And Vomiting     Projectile N/V, per patient.  Unwilling to retry therapy.       Review of Systems   Unable to perform ROS: Intubated     Objective:   Physical Exam   Constitutional: She appears well-developed and well-nourished. She is easily aroused. No distress. She is sedated and intubated.   HENT:   Head: Normocephalic and atraumatic.   Right Ear: External ear normal.   Left Ear: External ear normal.   ETT and OGT in place   Eyes: Conjunctivae and EOM are normal.   Neck: Normal range of motion. Neck supple. No JVD  present.   Right IJ CVC with dressing c/d/i   Cardiovascular: Normal rate, regular rhythm and normal heart sounds. Exam reveals no friction rub.   No murmur heard.  Pulmonary/Chest: She is intubated. She has decreased breath sounds in the right middle field and the right lower field. She has no wheezes. She has rhonchi in the left upper field.   Bilateral pleural and mediastinal chest tubes in place; bilateral air leak present   Abdominal: Soft. She exhibits no distension. Bowel sounds are decreased. There is no tenderness.   Obese abdomen   Musculoskeletal: She exhibits no edema.   Moves all 4 extremities   Neurological: She is easily aroused.   Able to interact with staff via writing upon arousal   Skin: Skin is warm and dry. No rash noted. She is not diaphoretic. There is pallor.   Nursing note and vitals reviewed.        Vital Signs (Most Recent):  Temp: 98.2 °F (36.8 °C) (06/24/19 1200)  Pulse: 73 (06/24/19 1200)  Resp: 20 (06/24/19 1200)  BP: (!) 142/104 (06/24/19 1200)  SpO2: 100 % (06/24/19 1200) Vital Signs (24h Range):  Temp:  [96.1 °F (35.6 °C)-98.2 °F (36.8 °C)] 98.2 °F (36.8 °C)  Pulse:  [] 73  Resp:  [13-77] 20  SpO2:  [89 %-100 %] 100 %  BP: (117-173)/() 142/104     Weight: 66.8 kg (147 lb 4.3 oz)  Body mass index is 27.83 kg/m².      Intake/Output Summary (Last 24 hours) at 6/24/2019 1305  Last data filed at 6/24/2019 1200  Gross per 24 hour   Intake 4011 ml   Output 5275 ml   Net -1264 ml       Ventilator Data:     Vent Mode: A/C  Oxygen Concentration (%):  [] 40  Resp Rate Total:  [12 br/min-34 br/min] 20 br/min  PEEP/CPAP:  [5 cmH20-8 cmH20] 5 cmH20  Pressure Support:  [12 cmH20] 12 cmH20  Mean Airway Pressure:  [7 hlW88-71 cmH20] 10 cmH20    Hemodynamic Parameters:       Lines/Drains:       Introducer right internal jugular (Active)   Specific Qualities Other (Comment) 6/24/2019 11:00 AM   Dressing Status Biopatch in place;Clean;Dry;Intact 6/24/2019 11:00 AM   Dressing  Intervention Dressing reinforced 6/24/2019 11:00 AM   Dressing Change Due 06/29/19 6/24/2019 11:00 AM   Daily Line Review Performed 6/24/2019 11:00 AM   Number of days:             Port A Cath Single Lumen 06/24/14 1200 right subclavian (Active)   Accessed by: Radha Lanza 6/24/2019  5:00 AM   Dressing Type Transparent 6/24/2019 11:00 AM   Dressing Status Biopatch in place;Clean;Dry;Intact 6/24/2019 11:00 AM   Dressing Intervention Dressing reinforced 6/24/2019 11:00 AM   Dressing Change Due 06/30/19 6/24/2019 11:00 AM   Line Status Saline locked 6/24/2019 11:00 AM   Flush Performed Yes 6/24/2019  5:00 AM   Date to be Reflushed 06/18/19 6/17/2019  8:00 PM   Daily Line Review Not Performed 6/24/2019 11:00 AM   Type of Needle Nicole 6/24/2019  5:00 AM   Gauge 20 6/24/2019  5:00 AM   Needle Length 1 in 6/24/2019  5:00 AM   Needle Status Left in place 6/24/2019  5:00 AM   Needle Insertion Date 06/24/19 6/24/2019  5:00 AM   Needle Insertion Time 0500 6/24/2019  5:00 AM   Number of days: 1826            Percutaneous Central Line Insertion/Assessment - double lumen  06/18/19 0510 (Active)   Dressing biopatch in place;dressing dry and intact 6/24/2019 11:00 AM   Securement secured w/ sutures 6/24/2019 11:00 AM   Additional Site Signs no erythema;no warmth;no edema;no pain;no drainage 6/24/2019 11:00 AM   Distal Patency/Care infusing 6/24/2019 11:00 AM   Proximal Patency/Care flushed w/o difficulty;blood return present;normal saline locked 6/24/2019 11:00 AM   Waveform normal 6/24/2019 11:00 AM   Line Interventions line leveled/zeroed 6/24/2019 11:00 AM   Dressing Change Due 06/29/19 6/24/2019 11:00 AM   Daily Line Review Performed 6/24/2019 11:00 AM   Number of days: 6            Percutaneous Central Line Insertion/Assessment - triple lumen  right internal jugular (Active)   Dressing biopatch in place;dressing dry and intact 6/24/2019 11:00 AM   Securement secured w/ sutures 6/24/2019 11:00 AM   Additional Site Signs no  erythema;no warmth;no edema;no pain;no drainage 6/24/2019 11:00 AM   Distal Patency/Care infusing 6/24/2019 11:00 AM   Medial Patency/Care infusing 6/24/2019 11:00 AM   Proximal Patency/Care infusing 6/24/2019 11:00 AM   Waveform normal 6/24/2019 11:00 AM   Line Interventions line leveled/zeroed 6/24/2019 11:00 AM   Dressing Change Due 06/29/19 6/24/2019 11:00 AM   Daily Line Review Performed 6/24/2019 11:00 AM   Number of days:             NG/OG Tube 06/21/19 1715 Center mouth (Active)   Placement Check placement verified by aspirate characteristics 6/24/2019 11:00 AM   Advancement advanced manually 6/24/2019 11:00 AM   Tolerance no signs/symptoms of discomfort 6/24/2019 11:00 AM   Securement secured to commercial device 6/24/2019  3:00 AM   Clamp Status/Tolerance unclamped 6/24/2019 11:00 AM   Suction Setting/Drainage Method suction at;low;intermittent setting 6/24/2019 11:00 AM   Insertion Site Appearance no redness, warmth, tenderness, skin breakdown, drainage 6/24/2019 11:00 AM   Drainage Brown;Clear 6/23/2019  3:15 PM   Flush/Irrigation flushed w/;water;no resistance met 6/24/2019 11:00 AM   Feeding Method continuous 6/23/2019 11:00 PM   Feeding Action feeding held 6/24/2019 11:00 AM   Current Rate (mL/hr) 20 mL/hr 6/23/2019  7:00 PM   Goal Rate (mL/hr) 20 mL/hr 6/23/2019  7:00 PM   Intake (mL) 60 mL 6/23/2019  9:00 PM   Water Bolus (mL) 120 mL 6/24/2019  8:00 AM   Tube Output(mL)(Include Discarded Residual) 50 mL 6/24/2019 11:00 AM   Intake (mL) - Formula Tube Feeding 0 6/24/2019 12:00 AM   Residual Amount (ml) 0 ml 6/23/2019  7:00 PM   Number of days: 2            Urethral Catheter 06/18/19 0504 Non-latex;Straight-tip;Temperature probe 14 Fr. (Active)   Site Assessment Clean;Intact 6/24/2019 11:00 AM   Collection Container Urimeter 6/24/2019 11:00 AM   Securement Method secured to top of thigh w/ adhesive device 6/24/2019 11:00 AM   Catheter Care Performed yes 6/24/2019 11:00 AM   Reason for Continuing  "Urinary Catheterization Critically ill in ICU requiring intensive monitoring 6/24/2019 11:00 AM   CAUTI Prevention Bundle StatLock in place w 1" slack;Intact seal between catheter & drainage tubing;Drainage bag off the floor;Green sheeting clip in use;No dependent loops or kinks;Drainage bag not overfilled (<2/3 full);Drainage bag below bladder 6/24/2019 11:00 AM   Output (mL) 300 mL 6/24/2019 12:00 PM   Number of days: 6            Y Chest Tube 1 and 2 06/18/19 1151 1 Right Mediastinal 19 Fr. 2 Right Pleural 19 Fr. (Active)   Function -20 cm H2O 6/24/2019 11:00 AM   Air Leak/Fluctuation air leak present;fluctuation present;connections tightened 6/24/2019 11:00 AM   Safety all tubing connections taped;2 rubber-tipped hemostats w/ patient;all connections secured;suction checked 6/24/2019 11:00 AM   Securement tubing secured to body distal to insertion site w/ tape 6/24/2019 11:00 AM   Left Subcutaneous Emphysema none present 6/24/2019 11:00 AM   Right Subcutaneous Emphysema none present 6/24/2019 11:00 AM   Patency Intervention Milked;Tip/tilt 6/24/2019 11:00 AM   Drainage Description 1 Serosanguineous 6/24/2019 11:00 AM   Tube 1 Dressing Appearance occlusive gauze dressing intact;clean and dry 6/24/2019 11:00 AM   Tube 1 Dressing Care dressing reinforced 6/24/2019 11:00 AM   Site Assessment 1 Not assessed;Other (Comment) 6/24/2019 11:00 AM   Surrounding Skin 1 Unable to view;Other (Comment) 6/24/2019 11:00 AM   Drainage Description 2 Serosanguineous 6/24/2019 11:00 AM   Tube 2 Dressing Appearance occlusive gauze dressing intact;clean and dry 6/24/2019 11:00 AM   Tube 2 Dressing Care dressing reinforced 6/24/2019 11:00 AM   Site Assessment 2 Not assessed;Other (Comment) 6/24/2019 11:00 AM   Surrounding Skin Unable to view;Other (Comment) 6/24/2019 11:00 AM   Output (mL) 10 mL 6/24/2019 11:22 AM   Number of days: 6            Y Chest Tube 3 and 4 06/18/19 1151 3 Left Pleural 19 Fr. 4 Left Mediastinal 19 Fr. (Active) "   Function -20 cm H2O 6/24/2019 11:00 AM   Air Leak/Fluctuation air leak present;fluctuation present;connections tightened 6/24/2019 11:00 AM   Safety all tubing connections taped;2 rubber-tipped hemostats w/ patient;all connections secured;suction checked 6/24/2019 11:00 AM   Securement tubing secured to body distal to insertion site w/ tape 6/24/2019 11:00 AM   Left Subcutaneous Emphysema none present 6/24/2019 11:00 AM   Right Subcutaneous Emphysema none present 6/24/2019 11:00 AM   Patency Intervention Milked;Tip/tilt 6/24/2019 11:00 AM   Drainage Description 3 Serosanguineous 6/24/2019 11:00 AM   Tube 3 Dressing Appearance occlusive gauze dressing intact;clean and dry 6/24/2019 11:00 AM   Tube 3 Dressing Care dressing reinforced 6/24/2019 11:00 AM   Site Assessment 3 Not assessed;Other (Comment) 6/24/2019 11:00 AM   Surrounding Skin 3 Dry;Intact 6/24/2019  3:00 AM   Drainage Description 4 Serosanguineous 6/24/2019 11:00 AM   Tube 4 Dressing Appearance occlusive gauze dressing intact;clean and dry 6/24/2019 11:00 AM   Tube 4 Dressing Care dressing reinforced 6/24/2019 11:00 AM   Site Assessment 4 Not assessed;Other (Comment) 6/24/2019 11:00 AM   Surrounding Skin 4 Unable to view;Other (Comment) 6/24/2019 11:00 AM   Output (mL) 20 mL 6/24/2019 11:22 AM   Number of days: 6       Significant Labs:  CBC:  Recent Labs   Lab 06/24/19  1210   WBC 9.77   RBC 2.73*   HGB 7.6*   HCT 23.9*   *   MCV 88   MCH 27.8   MCHC 31.8*     BMP:  Recent Labs   Lab 06/24/19  1210      K 4.5      CO2 26   BUN 14   CREATININE 0.7   CALCIUM 8.2*      Tacrolimus Levels:  Recent Labs   Lab 06/24/19  0341   TACROLIMUS 2.6*     Microbiology:  Microbiology Results (last 7 days)     Procedure Component Value Units Date/Time    Culture, Anaerobe [[id number]] Collected:  06/18/19 0930    Order Status:  Completed Specimen:  Tissue from Lung, Left Updated:  06/24/19 1107     Anaerobic Culture No anaerobes isolated    Narrative:        Donor lung    Culture, Respiratory with Gram Stain [[id number]] Collected:  06/23/19 1735    Order Status:  Completed Specimen:  Respiratory from Bronchial Wash Updated:  06/24/19 1039     Respiratory Culture No Growth     Gram Stain (Respiratory) <10 epithelial cells per low power field.     Gram Stain (Respiratory) Few WBC's     Gram Stain (Respiratory) Rare Gram negative rods    Fungus culture [[id number]] Collected:  06/23/19 1735    Order Status:  Sent Specimen:  Respiratory from Bronchial Wash Updated:  06/23/19 1913    AFB Culture & Smear [[id number]] Collected:  06/23/19 1735    Order Status:  Sent Specimen:  Respiratory from Bronchial Wash Updated:  06/23/19 1912    Culture, Respiratory with Gram Stain [[id number]]  (Susceptibility) Collected:  06/20/19 0808    Order Status:  Completed Specimen:  Respiratory from Bronchial Wash Updated:  06/22/19 1018     Respiratory Culture No S aureus isolated.     Respiratory Culture --     PSEUDOMONAS AERUGINOSA   Rare       Gram Stain (Respiratory) <10 epithelial cells per low power field.     Gram Stain (Respiratory) No WBC's     Gram Stain (Respiratory) No organisms seen    AFB Culture & Smear [[id number]] Collected:  06/20/19 0808    Order Status:  Completed Specimen:  Respiratory from Bronchial Wash Updated:  06/21/19 2127     AFB Culture & Smear Culture in progress     AFB CULTURE STAIN No acid fast bacilli seen.    Fungus culture [[id number]]     Order Status:  Canceled Specimen:  Respiratory from Bronchial Wash     Culture, Respiratory with Gram Stain [[id number]]     Order Status:  Canceled Specimen:  Respiratory from Bronchial Wash     AFB Culture & Smear [[id number]]     Order Status:  Canceled Specimen:  Respiratory from Bronchial Wash     Aerobic culture [[id number]] Collected:  06/18/19 0930    Order Status:  Completed Specimen:  Tissue from Lung, Left Updated:  06/20/19 1323     Aerobic Bacterial Culture --     CANDIDA ALBICANS  Few  No other significant  isolate      Narrative:       Donor lung    Fungus culture [[id number]] Collected:  06/20/19 0808    Order Status:  Sent Specimen:  Respiratory from Bronchial Wash Updated:  06/20/19 0936    AFB Culture & Smear [[id number]] Collected:  06/18/19 0930    Order Status:  Completed Specimen:  Tissue from Lung, Left Updated:  06/19/19 2127     AFB Culture & Smear Culture in progress     AFB CULTURE STAIN No acid fast bacilli seen.    Narrative:       Donor lung    Urine culture [[id number]] Collected:  06/17/19 1753    Order Status:  Completed Specimen:  Urine, Clean Catch Updated:  06/19/19 0117     Urine Culture, Routine No significant growth    Narrative:       Indicated criteria for high risk culture:->Other  Other (specify):->awaiting lung transplant    Gram stain [[id number]] Collected:  06/18/19 0930    Order Status:  Completed Specimen:  Tissue from Lung, Left Updated:  06/18/19 1041     Gram Stain Result Moderate WBC's      No organisms seen    Narrative:       Donor lung    Fungus culture [[id number]] Collected:  06/18/19 0930    Order Status:  Sent Specimen:  Tissue from Lung, Left Updated:  06/18/19 0944          I have reviewed all pertinent labs within the past 24 hours.    Diagnostic Results:  Chest X-Ray: I personally reviewed the films and findings are: No pneumothorax; slightly improved opacification of the right middle to lower lung zones     No Further

## 2019-06-24 NOTE — ASSESSMENT & PLAN NOTE
- Anesthesia consulted for pain management. Continue aggressive bowel regimen.   - Previously on ketamine 25 infusion and developed hyperacute delirium.  - Plan to begin ketamine bolus and infusion today with max dose of 5 to begin transition off of fentanyl in anticipation of vent weaning and extubation tomorrow. Plan for hydromorphone PCA in the coming days. Gabapentin restarted per anesthesia.   - Continue Q12 Bupivacaine in TARA catheters.

## 2019-06-24 NOTE — PROGRESS NOTES
Pt intubated.  Ventilator settings:  AC, Pressure Support, FiO2: 50%, PEEP 8, Rate: 20.   SpO2 100, sustaining.     Pt opens eyes spontaneously.   Following all commands.   Nods/gestures appropriately.   Using communication board PRN.    Propofol gtt @ 50mcg/kg/min.   Fentanyl gtt @ 300mcg/hr.   Precedex gtt @ 1.4mcg/kg/min.     Pain management following pt.   Q12H Bilateral TARA catheter bolused.   CLIFF Jones MD at bedside to administer boluses.     Goal to maintain SBP <160.   CVP 5, HOB flat.   -350cc/hr.     Q4H Accuchecks.   Insulin @ 1.2 units/hr.    Plan of care reviewed with pt and spouse.   Questions answered per ICU RN.  See flowsheet for details.

## 2019-06-24 NOTE — PHYSICIAN QUERY
PT Name: Juanita Ibarra  MR #: [id number]    Physician Query Form - Consultant Diagnosis Clarification     CDS/: Krupa Fish               Contact information: [email or web address]   This form is a permanent document in the medical record.     Query Date: June 24, 2019      By submitting this query, we are merely seeking further clarification of documentation.  Please utilize your independent clinical judgment when addressing the question(s) below.      The Medical record contains the following:   Diagnosis Supporting Clinical Information Location in Medical Record   reinutbated for hypoxic respiratory failure      Intubated and sedated following inability to protect airway yesterday    She remained altered throughout the am and decision was made to electively intubate for airway protection.  CXR with worsening opacities.  Plan for repeat bronchoscopy today. ID PN 6/22    Lung transplant PN 6/22      Lung transplant PN 6/22         Do you agree with the Consultants diagnosis of ___ hypoxic respiratory failure___________________?    [  ] Yes   [  ] Yes, but it resolved prior to my assessment of the patient   [x  ] No   [  ] Clinically insignificant   [  ] Other/Clarification of findings:   [  ] Clinically undetermined

## 2019-06-24 NOTE — ADDENDUM NOTE
Addendum  created 06/24/19 1129 by Farhan Jones MD    Intraprocedure Event edited, Intraprocedure Meds edited, Sign clinical note

## 2019-06-24 NOTE — ANESTHESIA POST-OP PAIN MANAGEMENT
Acute Pain Service Progress Note    Juanita Ibarra is a 29 y.o., female, [id number].    Surgery:  REDO BILATERAL LUNG TRANSPLANT    Post Op Day #: 6    Catheter type: perineural  Bilateral TARA placed 6/19/2019    Hand bolus: q12H 30mL of 0.25% Bupivacaine with 1:300k epi, 1mg preservative free dexamethasone, and 50mcg clonidine per side      Problem List:    There are no hospital problems to display for this patient.      Subjective:    Patient intubated and sedated    Objective:     Bilateral catheter sites clean/dry/intact.      Vitals   There were no vitals filed for this visit.     Labs    Admission on 06/17/2019   No results displayed because visit has over 200 results.           Meds   No current facility-administered medications for this visit.      No current outpatient medications on file.     Facility-Administered Medications Ordered in Other Visits   Medication Dose Route Frequency Provider Last Rate Last Dose    bisacodyl suppository 10 mg  10 mg Rectal Daily PRN Shama Canales PA-C   10 mg at 06/23/19 1000    ciprofloxacin (CIPRO)400mg/200ml D5W IVPB 400 mg  400 mg Intravenous Q8H Jacqueline Dumont PA-C 200 mL/hr at 06/24/19 0553 400 mg at 06/24/19 0553    dexmedetomidine (PRECEDEX) 400mcg/100mL 0.9% NaCL infusion  0.2 mcg/kg/hr Intravenous Continuous Shama Canales PA-C 22.5 mL/hr at 06/24/19 1000 1.4 mcg/kg/hr at 06/24/19 1000    dextrose 10% (D10W) Bolus  12.5 g Intravenous PRN Jonathan Newby MD 1,000 mL/hr at 06/19/19 0232 125 mL at 06/19/19 0232    dextrose 10% (D10W) Bolus  25 g Intravenous PRN Jonathan Newby MD        dextrose 5 % and 0.45 % NaCl with KCl 20 mEq infusion   Intravenous Continuous Jacqueline Douglass MD 5 mL/hr at 06/24/19 1100      dextrose 50% injection 12.5 g  12.5 g Intravenous PRN Amy Zapata NP        dextrose 50% injection 25 g  25 g Intravenous PRN Amy Zapata NP        diphenhydrAMINE injection 50 mg  50 mg Intravenous On Call  Procedure Francisca Morin, DO   50 mg at 06/24/19 0002    enoxaparin injection 40 mg  40 mg Subcutaneous Daily Jacqueline Dumont PA-C   40 mg at 06/23/19 1615    fentaNYL 2500 mcg in 0.9% sodium chloride 250 mL infusion premix (titrating)   Intravenous Continuous Harleen Lucinda Riley MD 20 mL/hr at 06/24/19 1100 200 mcg/hr at 06/24/19 1100    fluconazole (DIFLUCAN) IVPB 400 mg 200 mL  400 mg Intravenous Q24H Shama Canales PA-C 100 mL/hr at 06/24/19 0800 400 mg at 06/24/19 0800    ganciclovir (CYTOVENE) 295 mg in sodium chloride 0.9% 100 mL IVPB  5 mg/kg Intravenous Q12H Jacqueline Douglass  mL/hr at 06/24/19 0800 295 mg at 06/24/19 0800    glucagon (human recombinant) injection 1 mg  1 mg Intramuscular PRN Amy Zapata NP        glucose chewable tablet 16 g  16 g Oral PRN Amy Zapata NP        glucose chewable tablet 24 g  24 g Oral PRN Amy Zapata NP        HYDROmorphone PCA in 0.9 % NaCl 6 Mg/30 mL (0.2 mg/mL)   Intravenous Continuous Farhan H MD Karen        insulin aspart U-100 pen 0-4 Units  0-4 Units Subcutaneous PRN Amy Zapata NP   3 Units at 06/24/19 0002    insulin regular (Humulin R) 100 Units in sodium chloride 0.9% 100 mL infusion  1.2 Units/hr Intravenous Continuous Amy Zapata NP 1.2 mL/hr at 06/24/19 1100 1.2 Units/hr at 06/24/19 1100    ketamine (KETALAR) 5,000 mg in sodium chloride 0.9% 500 mL infusion  5 mcg/kg/min Intravenous Continuous Farhan H MD Karen 2 mL/hr at 06/24/19 1100 5 mcg/kg/min at 06/24/19 1100    lactulose 20 gram/30 mL solution Soln 20 g  20 g Oral Q6H PRN Angelica Gomez MD        levalbuterol nebulizer solution 1.25 mg  1.25 mg Nebulization Q4H PRN Jacqueline Dumont PA-C   1.25 mg at 06/20/19 2023    levalbuterol nebulizer solution 1.25 mg  1.25 mg Nebulization Q6H PRN Jacqueline Dumont PA-C   1.25 mg at 06/23/19 1627    levalbuterol nebulizer solution 1.25 mg  1.25 mg Nebulization Q6H Jacqueline Dumont PA-C    1.25 mg at 06/24/19 0755    lipase-protease-amylase (VIOKACE) 20,880-78,300- 78,300 units per tablet 1 tablet  1 tablet Oral Q3H PRN Francisca Morin DO   1 tablet at 06/23/19 2116    magnesium sulfate 2g in water 50mL IVPB (premix)  2 g Intravenous PRN Angelica Gomez MD   2 g at 06/24/19 0428    metoclopramide HCl injection 5 mg  5 mg Intravenous Q6H PRN Jacqueline Douglass MD        mycophenolate (CELLCEPT) 500 mg in dextrose 5 % 100 mL IVPB  500 mg Intravenous BID Shama Canales PA-C 50 mL/hr at 06/24/19 0800 500 mg at 06/24/19 0800    naloxone 0.4 mg/mL injection 0.02 mg  0.02 mg Intravenous PRN Riley Pennington MD        niCARdipine 40 mg/200 mL infusion  1 mg/hr Intravenous Continuous Harleen Riley MD   Stopped at 06/24/19 0200    ondansetron disintegrating tablet 8 mg  8 mg Oral Q8H PRN Jacqueline Douglass MD        ondansetron injection 4 mg  4 mg Intravenous Q6H PRN Shama Canales PA-C   Stopped at 06/20/19 1910    oxyCODONE immediate release tablet 10 mg  10 mg Oral Q4H PRN Jacqueline Douglass MD        oxyCODONE immediate release tablet 5 mg  5 mg Oral Q4H PRN Jacqueline Douglass MD        polyethylene glycol packet 17 g  17 g Per NG tube Daily Jacqueline Dumont PA-C   17 g at 06/24/19 0800    potassium chloride 20 mEq in 100 mL IVPB (FOR CENTRAL LINE ADMINISTRATION ONLY)  20 mEq Intravenous PRN Jacqueline Douglass MD 50 mL/hr at 06/22/19 0647 20 mEq at 06/22/19 0647    And    potassium chloride 40 mEq in 100 mL IVPB (FOR CENTRAL LINE ADMINISTRATION ONLY)  40 mEq Intravenous PRN Jacqueline Douglass MD 25 mL/hr at 06/18/19 1749 40 mEq at 06/18/19 1749    And    potassium chloride 20 mEq in 100 mL IVPB (FOR CENTRAL LINE ADMINISTRATION ONLY)  60 mEq Intravenous PRN Jacqueline Douglass MD        [START ON 6/25/2019] predniSONE tablet 25 mg  25 mg Per OG tube Daily Jake Alvarez MD        Followed by    [START ON 7/3/2019] predniSONE tablet 20 mg  20 mg Oral Daily  Jake Alvarez MD        promethazine (PHENERGAN) 6.25 mg in dextrose 5 % 50 mL IVPB  6.25 mg Intravenous Q6H PRN Chai HELEN Glass MD        propofol (DIPRIVAN) 10 mg/mL infusion  15 mcg/kg/min Intravenous Continuous Jacqueline Dumont PA-C 20.8 mL/hr at 06/24/19 1100 50 mcg/kg/min at 06/24/19 1100    tacrolimus (PROGRAF) 1 mg/mL oral syringe  1.5 mg Per NG tube BID Jacqueline Dumont PA-C   1.5 mg at 06/24/19 0800    vancomycin 750 mg in dextrose 5 % 250 mL IVPB (ready to mix system)  750 mg Intravenous Q12H Francisca Morin .7 mL/hr at 06/24/19 0419 750 mg at 06/24/19 0419        Anticoagulant dose: none.    Assessment:     Pain control inadequate    Plan:      Patient now s/p bilateral lung transplant on 06/18/19; Hx of significant opioid use post previous lung transplant approximately five years ago.     - Patient with significant opioid requirement prior to surgery from previous lung transplant  - Remained intubated s/p lung transplant, requiring fentanyl with dexmedetomidine at 1.4mcg/kg/hr and propofol 50mcg/kg/min for sedation  - S/P bilateral TARA catheter placement 06/19/19 w/ hand boluses Q12H at 0700 and 1900  - Patient placed on ketamine infusion on 6/20 at 10 mcg/kg/min, fentanyl and propofol had been weaned off then  - Patient extubated on 6/21 but re-intubated for respiratory distress, agitation, delirium. Ketamine discontinued.  - Fentanyl drip at 300mcg/hr on 6/23, up from 250 on 6/22.    - Plan to continue bilateral TARA hand bolus with a reduced concentration: 30 cc 0.25% bupivacaine w/ additives for each side q12h at 0700 and 1900. Once extubated and drips/sedation weaned, will consider transitioning to infusion pumps.  - Restarted ketamine at 5 mcg/kg/min with 15 mg bolus prior to start. Do not titrate. Will keep on low dose ketamine to aid in weaning of fentanyl prior to extubation. Dilaudid PCA to be started on extubation.         Evaluator Farhan Jones

## 2019-06-24 NOTE — PROGRESS NOTES
[facility]  Lung Transplant  Progress Note - Critical Care    Patient Name: Juanita Ibarra  MRN: [id number]  Admission Date: 6/17/2019  Hospital Length of Stay: 6 days  Post-Operative Day: 1838 (Lung), 6 (Lung)  Attending Physician: Jake Alvarez MD  Primary Care Provider: Primary Doctor No     Subjective:     Interval History: Called to bedside yesterday afternoon due to desats to high 80s. STAT CXR with bilateral pneumothoraces despite bilateral mediastinal/pleural tubes remaining to suction. CTS resident at bedside and was able to remove clots/strip chest tubes with new air leak noted in R chamber and increased air leak noted in L chamber. Repeat imaging with no evidence of pneumothorax this morning.     Right and left chest tube chambers with intermittent air leaks this AM and total of 180 cc serosanguinous output yesterday. Switched to SBT 12/5 this AM; however, did not tolerate it well while on 250 mcg of fentanyl. Will plan to begin ketamine infusion today and slowly wean off of fentanyl in anticipation of possible extubation tomorrow.     2-3 BMs reported overnight per RN today following enema. Plan to restart trickle feeds today and continue Miralax BID.    Continuous Infusions:   dexmedetomidine (PRECEDEX) infusion 1.4 mcg/kg/hr (06/24/19 1200)    dextrose 5 % and 0.45 % NaCl with KCl 20 mEq 5 mL/hr at 06/24/19 1200    fentanyl 200 mcg/hr (06/24/19 1200)    hydromorphone in 0.9 % NaCl 6 mg/30 ml      insulin (HUMAN R) infusion (adults) 1.2 Units/hr (06/24/19 1200)    ketamine (KETALAR) infusion 5 mcg/kg/min (06/24/19 1100)    nicardipine Stopped (06/24/19 0200)    propofol 50 mcg/kg/min (06/24/19 1200)     Scheduled Meds:   ciprofloxacin  400 mg Intravenous Q8H    enoxaparin  40 mg Subcutaneous Daily    fluconazole (DIFLUCAN) IVPB  400 mg Intravenous Q24H    gabapentin  250 mg Per NG tube Q8H    ganciclovir (CYTOVENE) IVPB  5 mg/kg Intravenous Q12H     levalbuterol  1.25 mg Nebulization Q6H    mycophenolate (CELLCEPT) IVPB  500 mg Intravenous BID    polyethylene glycol  17 g Per NG tube BID    [START ON 6/25/2019] predniSONE  25 mg Per OG tube Daily    Followed by    [START ON 7/3/2019] predniSONE  20 mg Oral Daily    tacrolimus  1.5 mg Per NG tube BID    vancomycin (VANCOCIN) IVPB  750 mg Intravenous Q12H     PRN Meds:bisacodyl, Dextrose 10% Bolus, Dextrose 10% Bolus, dextrose 50%, dextrose 50%, diphenhydrAMINE, glucagon (human recombinant), glucose, glucose, insulin aspart U-100, lactulose, levalbuterol, levalbuterol, lipase-protease-amylase (VIOKACE) 20,880-78,300- 78,300 units, magnesium sulfate IVPB, metoclopramide HCl, naloxone, ondansetron, ondansetron, oxyCODONE, oxyCODONE, potassium chloride in water **AND** potassium chloride in water **AND** potassium chloride in water, promethazine (PHENERGAN) IVPB    Review of patient's allergies indicates:   Allergen Reactions    Albuterol Palpitations    Colistin Anaphylaxis    Vancomycin analogues      Infusion reaction that does not resolve with slowing  Pt. States she can tolerate it when given with 50 mg Benadryl and ran over 3 hours    Neupogen [filgrastim] Other (See Comments)     Ostealgia after five daily doses of 300 mcg.      Bactrim [sulfamethoxazole-trimethoprim] Hives    Ceftazidime Hives     Pt stated can tolerate cefapine not ceftazidime    Ceftazidime     Dronabinol Other (See Comments)     Mental changes/hallucinations    Haldol [haloperidol lactate] Other (See Comments)     Seizure like activity    Nsaids (non-steroidal anti-inflammatory drug)      Cannot have due to lung transplant    Adhesive Rash     Cloth tape- please use tegaderm or paper tape    Aztreonam Rash    Ciprofloxacin Nausea And Vomiting     Projectile N/V, per patient.  Unwilling to retry therapy.       Review of Systems   Unable to perform ROS: Intubated     Objective:   Physical Exam   Constitutional: She  appears well-developed and well-nourished. She is easily aroused. No distress. She is sedated and intubated.   HENT:   Head: Normocephalic and atraumatic.   Right Ear: External ear normal.   Left Ear: External ear normal.   ETT and OGT in place   Eyes: Conjunctivae and EOM are normal.   Neck: Normal range of motion. Neck supple. No JVD present.   Right IJ CVC with dressing c/d/i   Cardiovascular: Normal rate, regular rhythm and normal heart sounds. Exam reveals no friction rub.   No murmur heard.  Pulmonary/Chest: She is intubated. She has decreased breath sounds in the right middle field and the right lower field. She has no wheezes. She has rhonchi in the left upper field.   Bilateral pleural and mediastinal chest tubes in place; bilateral air leak present   Abdominal: Soft. She exhibits no distension. Bowel sounds are decreased. There is no tenderness.   Obese abdomen   Musculoskeletal: She exhibits no edema.   Moves all 4 extremities   Neurological: She is easily aroused.   Able to interact with staff via writing upon arousal   Skin: Skin is warm and dry. No rash noted. She is not diaphoretic. There is pallor.   Nursing note and vitals reviewed.        Vital Signs (Most Recent):  Temp: 98.2 °F (36.8 °C) (06/24/19 1200)  Pulse: 73 (06/24/19 1200)  Resp: 20 (06/24/19 1200)  BP: (!) 142/104 (06/24/19 1200)  SpO2: 100 % (06/24/19 1200) Vital Signs (24h Range):  Temp:  [96.1 °F (35.6 °C)-98.2 °F (36.8 °C)] 98.2 °F (36.8 °C)  Pulse:  [] 73  Resp:  [13-77] 20  SpO2:  [89 %-100 %] 100 %  BP: (117-173)/() 142/104     Weight: 66.8 kg (147 lb 4.3 oz)  Body mass index is 27.83 kg/m².      Intake/Output Summary (Last 24 hours) at 6/24/2019 1305  Last data filed at 6/24/2019 1200  Gross per 24 hour   Intake 4011 ml   Output 5275 ml   Net -1264 ml       Ventilator Data:     Vent Mode: A/C  Oxygen Concentration (%):  [] 40  Resp Rate Total:  [12 br/min-34 br/min] 20 br/min  PEEP/CPAP:  [5 cmH20-8 cmH20] 5  cmH20  Pressure Support:  [12 cmH20] 12 cmH20  Mean Airway Pressure:  [7 wfN53-74 cmH20] 10 cmH20    Hemodynamic Parameters:       Lines/Drains:       Introducer right internal jugular (Active)   Specific Qualities Other (Comment) 6/24/2019 11:00 AM   Dressing Status Biopatch in place;Clean;Dry;Intact 6/24/2019 11:00 AM   Dressing Intervention Dressing reinforced 6/24/2019 11:00 AM   Dressing Change Due 06/29/19 6/24/2019 11:00 AM   Daily Line Review Performed 6/24/2019 11:00 AM   Number of days:             Port A Cath Single Lumen 06/24/14 1200 right subclavian (Active)   Accessed by: Radha Lanza 6/24/2019  5:00 AM   Dressing Type Transparent 6/24/2019 11:00 AM   Dressing Status Biopatch in place;Clean;Dry;Intact 6/24/2019 11:00 AM   Dressing Intervention Dressing reinforced 6/24/2019 11:00 AM   Dressing Change Due 06/30/19 6/24/2019 11:00 AM   Line Status Saline locked 6/24/2019 11:00 AM   Flush Performed Yes 6/24/2019  5:00 AM   Date to be Reflushed 06/18/19 6/17/2019  8:00 PM   Daily Line Review Not Performed 6/24/2019 11:00 AM   Type of Needle Nicole 6/24/2019  5:00 AM   Gauge 20 6/24/2019  5:00 AM   Needle Length 1 in 6/24/2019  5:00 AM   Needle Status Left in place 6/24/2019  5:00 AM   Needle Insertion Date 06/24/19 6/24/2019  5:00 AM   Needle Insertion Time 0500 6/24/2019  5:00 AM   Number of days: 1826            Percutaneous Central Line Insertion/Assessment - double lumen  06/18/19 0510 (Active)   Dressing biopatch in place;dressing dry and intact 6/24/2019 11:00 AM   Securement secured w/ sutures 6/24/2019 11:00 AM   Additional Site Signs no erythema;no warmth;no edema;no pain;no drainage 6/24/2019 11:00 AM   Distal Patency/Care infusing 6/24/2019 11:00 AM   Proximal Patency/Care flushed w/o difficulty;blood return present;normal saline locked 6/24/2019 11:00 AM   Waveform normal 6/24/2019 11:00 AM   Line Interventions line leveled/zeroed 6/24/2019 11:00 AM   Dressing Change Due 06/29/19 6/24/2019  11:00 AM   Daily Line Review Performed 6/24/2019 11:00 AM   Number of days: 6            Percutaneous Central Line Insertion/Assessment - triple lumen  right internal jugular (Active)   Dressing biopatch in place;dressing dry and intact 6/24/2019 11:00 AM   Securement secured w/ sutures 6/24/2019 11:00 AM   Additional Site Signs no erythema;no warmth;no edema;no pain;no drainage 6/24/2019 11:00 AM   Distal Patency/Care infusing 6/24/2019 11:00 AM   Medial Patency/Care infusing 6/24/2019 11:00 AM   Proximal Patency/Care infusing 6/24/2019 11:00 AM   Waveform normal 6/24/2019 11:00 AM   Line Interventions line leveled/zeroed 6/24/2019 11:00 AM   Dressing Change Due 06/29/19 6/24/2019 11:00 AM   Daily Line Review Performed 6/24/2019 11:00 AM   Number of days:             NG/OG Tube 06/21/19 1715 Center mouth (Active)   Placement Check placement verified by aspirate characteristics 6/24/2019 11:00 AM   Advancement advanced manually 6/24/2019 11:00 AM   Tolerance no signs/symptoms of discomfort 6/24/2019 11:00 AM   Securement secured to commercial device 6/24/2019  3:00 AM   Clamp Status/Tolerance unclamped 6/24/2019 11:00 AM   Suction Setting/Drainage Method suction at;low;intermittent setting 6/24/2019 11:00 AM   Insertion Site Appearance no redness, warmth, tenderness, skin breakdown, drainage 6/24/2019 11:00 AM   Drainage Brown;Clear 6/23/2019  3:15 PM   Flush/Irrigation flushed w/;water;no resistance met 6/24/2019 11:00 AM   Feeding Method continuous 6/23/2019 11:00 PM   Feeding Action feeding held 6/24/2019 11:00 AM   Current Rate (mL/hr) 20 mL/hr 6/23/2019  7:00 PM   Goal Rate (mL/hr) 20 mL/hr 6/23/2019  7:00 PM   Intake (mL) 60 mL 6/23/2019  9:00 PM   Water Bolus (mL) 120 mL 6/24/2019  8:00 AM   Tube Output(mL)(Include Discarded Residual) 50 mL 6/24/2019 11:00 AM   Intake (mL) - Formula Tube Feeding 0 6/24/2019 12:00 AM   Residual Amount (ml) 0 ml 6/23/2019  7:00 PM   Number of days: 2            Urethral  "Catheter 06/18/19 0504 Non-latex;Straight-tip;Temperature probe 14 Fr. (Active)   Site Assessment Clean;Intact 6/24/2019 11:00 AM   Collection Container Urimeter 6/24/2019 11:00 AM   Securement Method secured to top of thigh w/ adhesive device 6/24/2019 11:00 AM   Catheter Care Performed yes 6/24/2019 11:00 AM   Reason for Continuing Urinary Catheterization Critically ill in ICU requiring intensive monitoring 6/24/2019 11:00 AM   CAUTI Prevention Bundle StatLock in place w 1" slack;Intact seal between catheter & drainage tubing;Drainage bag off the floor;Green sheeting clip in use;No dependent loops or kinks;Drainage bag not overfilled (<2/3 full);Drainage bag below bladder 6/24/2019 11:00 AM   Output (mL) 300 mL 6/24/2019 12:00 PM   Number of days: 6            Y Chest Tube 1 and 2 06/18/19 1151 1 Right Mediastinal 19 Fr. 2 Right Pleural 19 Fr. (Active)   Function -20 cm H2O 6/24/2019 11:00 AM   Air Leak/Fluctuation air leak present;fluctuation present;connections tightened 6/24/2019 11:00 AM   Safety all tubing connections taped;2 rubber-tipped hemostats w/ patient;all connections secured;suction checked 6/24/2019 11:00 AM   Securement tubing secured to body distal to insertion site w/ tape 6/24/2019 11:00 AM   Left Subcutaneous Emphysema none present 6/24/2019 11:00 AM   Right Subcutaneous Emphysema none present 6/24/2019 11:00 AM   Patency Intervention Milked;Tip/tilt 6/24/2019 11:00 AM   Drainage Description 1 Serosanguineous 6/24/2019 11:00 AM   Tube 1 Dressing Appearance occlusive gauze dressing intact;clean and dry 6/24/2019 11:00 AM   Tube 1 Dressing Care dressing reinforced 6/24/2019 11:00 AM   Site Assessment 1 Not assessed;Other (Comment) 6/24/2019 11:00 AM   Surrounding Skin 1 Unable to view;Other (Comment) 6/24/2019 11:00 AM   Drainage Description 2 Serosanguineous 6/24/2019 11:00 AM   Tube 2 Dressing Appearance occlusive gauze dressing intact;clean and dry 6/24/2019 11:00 AM   Tube 2 Dressing Care " dressing reinforced 6/24/2019 11:00 AM   Site Assessment 2 Not assessed;Other (Comment) 6/24/2019 11:00 AM   Surrounding Skin Unable to view;Other (Comment) 6/24/2019 11:00 AM   Output (mL) 10 mL 6/24/2019 11:22 AM   Number of days: 6            Y Chest Tube 3 and 4 06/18/19 1151 3 Left Pleural 19 Fr. 4 Left Mediastinal 19 Fr. (Active)   Function -20 cm H2O 6/24/2019 11:00 AM   Air Leak/Fluctuation air leak present;fluctuation present;connections tightened 6/24/2019 11:00 AM   Safety all tubing connections taped;2 rubber-tipped hemostats w/ patient;all connections secured;suction checked 6/24/2019 11:00 AM   Securement tubing secured to body distal to insertion site w/ tape 6/24/2019 11:00 AM   Left Subcutaneous Emphysema none present 6/24/2019 11:00 AM   Right Subcutaneous Emphysema none present 6/24/2019 11:00 AM   Patency Intervention Milked;Tip/tilt 6/24/2019 11:00 AM   Drainage Description 3 Serosanguineous 6/24/2019 11:00 AM   Tube 3 Dressing Appearance occlusive gauze dressing intact;clean and dry 6/24/2019 11:00 AM   Tube 3 Dressing Care dressing reinforced 6/24/2019 11:00 AM   Site Assessment 3 Not assessed;Other (Comment) 6/24/2019 11:00 AM   Surrounding Skin 3 Dry;Intact 6/24/2019  3:00 AM   Drainage Description 4 Serosanguineous 6/24/2019 11:00 AM   Tube 4 Dressing Appearance occlusive gauze dressing intact;clean and dry 6/24/2019 11:00 AM   Tube 4 Dressing Care dressing reinforced 6/24/2019 11:00 AM   Site Assessment 4 Not assessed;Other (Comment) 6/24/2019 11:00 AM   Surrounding Skin 4 Unable to view;Other (Comment) 6/24/2019 11:00 AM   Output (mL) 20 mL 6/24/2019 11:22 AM   Number of days: 6       Significant Labs:  CBC:  Recent Labs   Lab 06/24/19  1210   WBC 9.77   RBC 2.73*   HGB 7.6*   HCT 23.9*   *   MCV 88   MCH 27.8   MCHC 31.8*     BMP:  Recent Labs   Lab 06/24/19  1210      K 4.5      CO2 26   BUN 14   CREATININE 0.7   CALCIUM 8.2*      Tacrolimus Levels:  Recent Labs   Lab  06/24/19  0341   TACROLIMUS 2.6*     Microbiology:  Microbiology Results (last 7 days)     Procedure Component Value Units Date/Time    Culture, Anaerobe [[id number]] Collected:  06/18/19 0930    Order Status:  Completed Specimen:  Tissue from Lung, Left Updated:  06/24/19 1107     Anaerobic Culture No anaerobes isolated    Narrative:       Donor lung    Culture, Respiratory with Gram Stain [[id number]] Collected:  06/23/19 1735    Order Status:  Completed Specimen:  Respiratory from Bronchial Wash Updated:  06/24/19 1039     Respiratory Culture No Growth     Gram Stain (Respiratory) <10 epithelial cells per low power field.     Gram Stain (Respiratory) Few WBC's     Gram Stain (Respiratory) Rare Gram negative rods    Fungus culture [[id number]] Collected:  06/23/19 1735    Order Status:  Sent Specimen:  Respiratory from Bronchial Wash Updated:  06/23/19 1913    AFB Culture & Smear [[id number]] Collected:  06/23/19 1735    Order Status:  Sent Specimen:  Respiratory from Bronchial Wash Updated:  06/23/19 1912    Culture, Respiratory with Gram Stain [[id number]]  (Susceptibility) Collected:  06/20/19 0808    Order Status:  Completed Specimen:  Respiratory from Bronchial Wash Updated:  06/22/19 1018     Respiratory Culture No S aureus isolated.     Respiratory Culture --     PSEUDOMONAS AERUGINOSA   Rare       Gram Stain (Respiratory) <10 epithelial cells per low power field.     Gram Stain (Respiratory) No WBC's     Gram Stain (Respiratory) No organisms seen    AFB Culture & Smear [[id number]] Collected:  06/20/19 0808    Order Status:  Completed Specimen:  Respiratory from Bronchial Wash Updated:  06/21/19 2127     AFB Culture & Smear Culture in progress     AFB CULTURE STAIN No acid fast bacilli seen.    Fungus culture [[id number]]     Order Status:  Canceled Specimen:  Respiratory from Bronchial Wash     Culture, Respiratory with Gram Stain [[id number]]     Order Status:  Canceled Specimen:  Respiratory from Bronchial  Wash     AFB Culture & Smear [[id number]]     Order Status:  Canceled Specimen:  Respiratory from Bronchial Wash     Aerobic culture [[id number]] Collected:  06/18/19 0930    Order Status:  Completed Specimen:  Tissue from Lung, Left Updated:  06/20/19 1323     Aerobic Bacterial Culture --     CANDIDA ALBICANS  Few  No other significant isolate      Narrative:       Donor lung    Fungus culture [[id number]] Collected:  06/20/19 0808    Order Status:  Sent Specimen:  Respiratory from Bronchial Wash Updated:  06/20/19 0936    AFB Culture & Smear [[id number]] Collected:  06/18/19 0930    Order Status:  Completed Specimen:  Tissue from Lung, Left Updated:  06/19/19 2127     AFB Culture & Smear Culture in progress     AFB CULTURE STAIN No acid fast bacilli seen.    Narrative:       Donor lung    Urine culture [[id number]] Collected:  06/17/19 1753    Order Status:  Completed Specimen:  Urine, Clean Catch Updated:  06/19/19 0117     Urine Culture, Routine No significant growth    Narrative:       Indicated criteria for high risk culture:->Other  Other (specify):->awaiting lung transplant    Gram stain [[id number]] Collected:  06/18/19 0930    Order Status:  Completed Specimen:  Tissue from Lung, Left Updated:  06/18/19 1041     Gram Stain Result Moderate WBC's      No organisms seen    Narrative:       Donor lung    Fungus culture [[id number]] Collected:  06/18/19 0930    Order Status:  Sent Specimen:  Tissue from Lung, Left Updated:  06/18/19 0944          I have reviewed all pertinent labs within the past 24 hours.    Diagnostic Results:  Chest X-Ray: I personally reviewed the films and findings are: No pneumothorax; slightly improved opacification of the right middle to lower lung zones     No Further        Assessment/Plan:     * S/P lung transplant  POD#6 s/p bilateral lung transplant (re-transplant) for CARLOS EDUARDO. 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 Remains PGD 0. S/p bronch and extubated to HFNC on POD#2. Re-intubated on   CTS to manage chest tubes.     - Continue lung protective ventilation. Currently on A/C PC with Pi 18, Rate 20, Ti 0.85, PEEP 8, FiO2 40%. Plan for repeat ABG tomorrow morning. please call for any desaturations.     Continue Xopenex nebs Q6 hrs. Continue immunosuppression and ppx per protocol. Continue Daily CXR. Hold tube feeds at midnight for possible bronchoscopy tomorrow. Plan for possible vent weaning and extubation tomorrow.    Immunosuppression  Previously on tacrolimus, MMF, and daily prednisone. Received solumedrol in OR and basiliximab upon arrival to ICU. Basiliximab on POD#0 and POD#4.   - Continue MMF, tacrolimus, and steroid taper. Tacrolimus increased to 1.5 mg BID on 6/23. Monitor daily tacrolimus levels and adjust dose as needed.     Prophylactic antibiotic  - CMV D-/R+. Continue OIP with ganciclovir, fluconazole, and dapsone.   -Previously on Merrem/Vanc for routine surgical ppx based on previous sensitivities.   -Bronchial wash culture from 6/20 with  Pseudomonas. Continue vancomycin given history of MRSA. Meropenem d/c. Tobramycin x1 on 6/22. Continue ciprofloxacin 400 mg Q8hrs. Repeat bronchial wash cultures from 6/23 with NGTD.         Acute blood loss anemia  Received total of 6u PRBCs, 2u plts, 1 cryo, and 4 FFP angie-operatively. Hemodynamically stable today. Will be conservative with future transfusions. Continue to monitor CBC on daily labs. No need to repeat CBC later today unless chest tube output drastically increases.    Thrombocytopenia, unspecified  - Expected post-operatively. Continue to monitor on serial CBC.    Adrenal cortical steroids causing adverse effect in therapeutic use  Endocrinology consulted, appreciate recs.    CF related Pancreatic insufficiency  - Begin Viokace enzymes with trickle tube feedings today. Hold VioKace enzyme supplementation when tube feeds are held.    Chronic pain with opiate use  - Anesthesia consulted for pain management. Continue aggressive bowel  regimen.   - Previously on ketamine 25 infusion and developed hyperacute delirium.  - Plan to begin ketamine bolus and infusion today with max dose of 5 to begin transition off of fentanyl in anticipation of vent weaning and extubation tomorrow. Plan for hydromorphone PCA in the coming days. Gabapentin restarted per anesthesia.   - Continue Q12 Bupivacaine in TARA catheters.       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 Meropenem d/c. Ciprofloxacin 400 mg Q8hrs started. Tobramycin x1 on 6/22. Repeat cultures from 6/23 with NGTD.    Steroid-induced hyperglycemia  - Endocrine consulted.  Currently on Insulin gtt.  Goal BG <160    Constipation  Increased risk of obstruction as patient has CF as well as history of SBO with previous transplant. Received glycerin suppository x2 on 6/22 with one small BM (per RN communication, not documented). Received brown bomb enema on 6/23 with 2 BMs overnight (per RN communication). Continue trickle tube feeds and Miralax BID.         Preventive Measures: Nutrition: Goal: tube feeds, DVT: continue prophyllaxis    Counseling/Consultation:I discussed the case with Dr. Alvarez., I discussed the patient's condition/prognosis with the family.      Jacqueline Dumont PA-C  Lung Transplant  [facility]

## 2019-06-24 NOTE — PROGRESS NOTES
CLIFF Jones MD at bedside.   MD setting up/administering Ketamine gtt.   Ketamine gtt started @ 5mcg/kg/min.   Non titrating gtt.   15mg bolus administered per MD.     See MAR for details.

## 2019-06-24 NOTE — PROGRESS NOTES
Pts core temperature per bladder probe 95 degrees.   MD notified.   Warm blankets and Cinda hugger placed on pt.   See flowsheet for details.

## 2019-06-24 NOTE — ASSESSMENT & PLAN NOTE
Received total of 6u PRBCs, 2u plts, 1 cryo, and 4 FFP angie-operatively. Hemodynamically stable today. Will be conservative with future transfusions. Continue to monitor CBC on daily labs. No need to repeat CBC later today unless chest tube output drastically increases.

## 2019-06-24 NOTE — SUBJECTIVE & OBJECTIVE
"Interval HPI:   Overnight events: remains in ICU, intubated. BG above goal on insulin infusion at 1.2 u/hr with correction scale while on TF. TF suspended at midnight and BG trending down. Prednisone 35 mg daily; steroid taper per primary.   Eating:   NPO  Nausea: No  Hypoglycemia and intervention: No  Fever: No  TF: impact peptide at 10 cc/hr  (stopped overnight; restarted this afternoon).     BP (!) 145/99   Pulse 79   Temp 97.2 °F (36.2 °C)   Resp (!) 31   Ht 5' 1" (1.549 m)   Wt 66.8 kg (147 lb 4.3 oz)   LMP 10/02/2016   SpO2 100%   Breastfeeding? No   BMI 27.83 kg/m²     Labs Reviewed and Include    Recent Labs   Lab 06/24/19  0341   *   CALCIUM 8.1*   ALBUMIN 2.3*   PROT 5.9*      K 4.0   CO2 26      BUN 13   CREATININE 0.7   ALKPHOS 132   ALT 31   AST 21   BILITOT 0.3     Lab Results   Component Value Date    WBC 8.73 06/24/2019    HGB 7.0 (L) 06/24/2019    HCT 21.1 (L) 06/24/2019    MCV 85 06/24/2019     (L) 06/24/2019     No results for input(s): TSH, FREET4 in the last 168 hours.  Lab Results   Component Value Date    HGBA1C 4.7 01/09/2019       Nutritional status:   Body mass index is 27.83 kg/m².  Lab Results   Component Value Date    ALBUMIN 2.3 (L) 06/24/2019    ALBUMIN 2.2 (L) 06/23/2019    ALBUMIN 2.3 (L) 06/22/2019     Lab Results   Component Value Date    PREALBUMIN 15 (L) 05/29/2014    PREALBUMIN 11 (L) 05/09/2014    PREALBUMIN 18 (L) 03/19/2014       Estimated Creatinine Clearance: 102.8 mL/min (based on SCr of 0.7 mg/dL).    Accu-Checks  Recent Labs     06/22/19 2014 06/23/19  0000 06/23/19  0432 06/23/19  0917 06/23/19  1218 06/23/19  1608 06/23/19 2013 06/24/19  0002 06/24/19  0333 06/24/19  0335   POCTGLUCOSE 278* 274* 276* 242* 196* 262* 339* 320* 141* 137*       Current Medications and/or Treatments Impacting Glycemic Control  Immunotherapy:    Immunosuppressants         Stop Route Frequency     tacrolimus (PROGRAF) 1 mg/mL oral syringe      -- PER NG " TUBE 2 times daily     mycophenolate (CELLCEPT) 500 mg in dextrose 5 % 100 mL IVPB      -- IV 2 times daily        Steroids:   Hormones (From admission, onward)    Start     Stop Route Frequency Ordered    07/03/19 0900  predniSONE tablet 20 mg      -- Oral Daily 06/24/19 0819 06/25/19 0900  predniSONE tablet 25 mg      07/03 0859 OG Daily 06/24/19 0819        Pressors:    Autonomic Drugs (From admission, onward)    None        Hyperglycemia/Diabetes Medications:   Antihyperglycemics (From admission, onward)    Start     Stop Route Frequency Ordered    06/20/19 1545  insulin regular (Humulin R) 100 Units in sodium chloride 0.9% 100 mL infusion      -- IV Continuous 06/20/19 1442    06/20/19 1542  insulin aspart U-100 pen 0-4 Units      -- SubQ As needed (PRN) 06/20/19 1442

## 2019-06-24 NOTE — ASSESSMENT & PLAN NOTE
POD#6 s/p bilateral lung transplant (re-transplant) for CARLOS EDUARDO. 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 Remains PGD 0. S/p bronch and extubated to HFNC on POD#2. Re-intubated on  CTS to manage chest tubes.     - Continue lung protective ventilation. Currently on A/C PC with Pi 18, Rate 20, Ti 0.85, PEEP 8, FiO2 40%. Plan for repeat ABG tomorrow morning. please call for any desaturations.     Continue Xopenex nebs Q6 hrs. Continue immunosuppression and ppx per protocol. Continue Daily CXR. Hold tube feeds at midnight for possible bronchoscopy tomorrow. Plan for possible vent weaning and extubation tomorrow.

## 2019-06-24 NOTE — ADDENDUM NOTE
Addendum  created 06/24/19 0951 by Heather Awad MD    Diagnosis association updated, Intraprocedure Blocks edited, Sign clinical note

## 2019-06-24 NOTE — ADDENDUM NOTE
Addendum  created 06/24/19 1153 by Kenny Merino MD    Charge Capture section accepted, Cosign clinical note with attestation, Order list changed

## 2019-06-24 NOTE — PROGRESS NOTES
"[facility]  Endocrinology  Progress Note    Admit Date: 6/17/2019     Reason for Consult: Management of  Hyperglycemia and CF related pancreatic insufficiency.     Surgical Procedure and Date: lung transplant in 2014; 6/18/19    HPI:   Patient is a 29 y.o. female with a diagnosis of CF, HTN, migraine headache, and osteopenia. 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 No personal history of DM. Endocrinology consulted for BG management.     Lab Results   Component Value Date    HGBA1C 4.7 01/09/2019                 Interval HPI:   Overnight events: remains in ICU, intubated. BG above goal on insulin infusion at 1.2 u/hr with correction scale while on TF. TF suspended at midnight and BG trending down. Prednisone 35 mg daily; steroid taper per primary.   Eating:   NPO  Nausea: No  Hypoglycemia and intervention: No  Fever: No  TF: impact peptide at 10 cc/hr  (stopped overnight; restarted this afternoon).     BP (!) 145/99   Pulse 79   Temp 97.2 °F (36.2 °C)   Resp (!) 31   Ht 5' 1" (1.549 m)   Wt 66.8 kg (147 lb 4.3 oz)   LMP 10/02/2016   SpO2 100%   Breastfeeding? No   BMI 27.83 kg/m²      Labs Reviewed and Include    Recent Labs   Lab 06/24/19  0341   *   CALCIUM 8.1*   ALBUMIN 2.3*   PROT 5.9*      K 4.0   CO2 26      BUN 13   CREATININE 0.7   ALKPHOS 132   ALT 31   AST 21   BILITOT 0.3     Lab Results   Component Value Date    WBC 8.73 06/24/2019    HGB 7.0 (L) 06/24/2019    HCT 21.1 (L) 06/24/2019    MCV 85 06/24/2019     (L) 06/24/2019     No results for input(s): TSH, FREET4 in the last 168 hours.  Lab Results   Component Value Date    HGBA1C 4.7 01/09/2019       Nutritional status:   Body mass index is 27.83 kg/m².  Lab Results   Component Value Date    ALBUMIN 2.3 (L) 06/24/2019    ALBUMIN 2.2 (L) 06/23/2019    ALBUMIN 2.3 (L) 06/22/2019     Lab Results   Component Value Date    PREALBUMIN 15 (L) " 05/29/2014    PREALBUMIN 11 (L) 05/09/2014    PREALBUMIN 18 (L) 03/19/2014       Estimated Creatinine Clearance: 102.8 mL/min (based on SCr of 0.7 mg/dL).    Accu-Checks  Recent Labs     06/22/19 2014 06/23/19  0000 06/23/19  0432 06/23/19  0917 06/23/19  1218 06/23/19  1608 06/23/19 2013 06/24/19  0002 06/24/19  0333 06/24/19  0335   POCTGLUCOSE 278* 274* 276* 242* 196* 262* 339* 320* 141* 137*       Current Medications and/or Treatments Impacting Glycemic Control  Immunotherapy:    Immunosuppressants         Stop Route Frequency     tacrolimus (PROGRAF) 1 mg/mL oral syringe      -- PER NG TUBE 2 times daily     mycophenolate (CELLCEPT) 500 mg in dextrose 5 % 100 mL IVPB      -- IV 2 times daily        Steroids:   Hormones (From admission, onward)    Start     Stop Route Frequency Ordered    07/03/19 0900  predniSONE tablet 20 mg      -- Oral Daily 06/24/19 0819    06/25/19 0900  predniSONE tablet 25 mg      07/03 0859 OG Daily 06/24/19 0819        Pressors:    Autonomic Drugs (From admission, onward)    None        Hyperglycemia/Diabetes Medications:   Antihyperglycemics (From admission, onward)    Start     Stop Route Frequency Ordered    06/20/19 1545  insulin regular (Humulin R) 100 Units in sodium chloride 0.9% 100 mL infusion      -- IV Continuous 06/20/19 1442    06/20/19 1542  insulin aspart U-100 pen 0-4 Units      -- SubQ As needed (PRN) 06/20/19 1442          ASSESSMENT and PLAN    * S/P lung transplant  Managed per LUT.   avoid hypoglycemia        Hyperglycemia  BG goal 140 - 180.       continue insulin infusion at 1.2  u/hr.   BG monitoring every 4 hours and low dose correction scale.         Adrenal cortical steroids causing adverse effect in therapeutic use  Glucocorticoids markedly increase  glucoses. Expect the steroid taper will help glucose control.         Prophylactic immunotherapy  May increase insulin resistance.       CF related Pancreatic insufficiency  May impact BG.           Amy RM  KUMAR Zapata  Endocrinology  [facility]

## 2019-06-24 NOTE — ASSESSMENT & PLAN NOTE
- CMV D-/R+. Continue OIP with ganciclovir, fluconazole, and dapsone.   -Previously on Merrem/Vanc for routine surgical ppx based on previous sensitivities.   -Bronchial wash culture from 6/20 with  Pseudomonas. Continue vancomycin given history of MRSA. Meropenem d/c. Tobramycin x1 on 6/22. Continue ciprofloxacin 400 mg Q8hrs. Repeat bronchial wash cultures from 6/23 with NGTD.

## 2019-06-24 NOTE — ASSESSMENT & PLAN NOTE
Pseudomonas from 6/20 bronchial wash. Meropenem d/c. Ciprofloxacin 400 mg Q8hrs started. Tobramycin x1 on 6/22. Repeat cultures from 6/23 with NGTD.

## 2019-06-24 NOTE — ASSESSMENT & PLAN NOTE
Increased risk of obstruction as patient has CF as well as history of SBO with previous transplant. Received glycerin suppository x2 on 6/22 with one small BM (per RN communication, not documented). Received brown bomb enema on 6/23 with 2 BMs overnight (per RN communication). Continue trickle tube feeds and Miralax BID.

## 2019-06-24 NOTE — PT/OT/SLP PROGRESS
Physical Therapy      Patient Name:  Juanita Ibarra   MRN:  [id number]    Patient not seen today secondary to (pt on hold per RN 2nd to being intubated, scheduled for a bronch and medically unstable. ). Will follow-up at a later date..    Sindy Koo, PT 6/24/2019

## 2019-06-24 NOTE — PLAN OF CARE
Problem: Adult Inpatient Plan of Care  Goal: Plan of Care Review  Outcome: Ongoing (interventions implemented as appropriate)  POC reviewed with pt and spouse at bedside, VSS, sats 100%, SvO2 58 this am, follows commands and writes needs and questions,remains intubated on A/C PC 50% and PEEP8, xray obtained this am, CTs in place with minimal serosang drainage, to suction, L CT with air leak. UOP >100 mL/hr, CVP 6, 7,7. Propofol and Precedex gtt infusing for sedation, Fentanyl gtt infusing for pain @ 300 mcg/hr. Pt turned and repositioned q2h, no skin breakdown noted. Plan for another bronch this am, Ketamine gtt and PCA and to extubate pt. All questions answered and addressed, verbalized understanding and compliance.

## 2019-06-24 NOTE — PROGRESS NOTES
"SW f/u with pt and family during rounds in ICU.  Pt re-intubated on late Friday on 6/21/19.  Pt spouse present in room and reports to coping adequately.  Spouse states pt's mother has not been by due to pt "not wanting her to be here."  Pt was opening eyes and nodding to questions by MD, however was not fully alert.  Per team plan is to wean sedative medication in order to re-extubate pt.  No needs voiced by family at this time.  SW will continue to provide psychosocial support, education, resources and assistance with needs as appropriate    "

## 2019-06-25 PROBLEM — Z78.9 ON ENTERAL NUTRITION: Status: ACTIVE | Noted: 2019-01-01

## 2019-06-25 NOTE — PROGRESS NOTES
Patient was seen with Dr. Alvarez, Dr. Antunez, Reyes Will NP, and Yasmani Rain, PharmD.  Patient was sedated and intubated, with bedside bronchoscopy performed by Dr. Alvarez.  Patient's significant other Shawn was at bedside and he was updated on the plan to wean sedation in hopes of extubating the patient today.  All questions were answered to his satisfaction and he verbalized his understanding of all information discussed.  Transplant coordinator will continue to follow with the multi-disciplinary team, and remains available to assist with patient/family care and education.

## 2019-06-25 NOTE — ADDENDUM NOTE
Addendum  created 06/25/19 1107 by Kenny Merino MD    Charge Capture section accepted, Cosign clinical note with attestation

## 2019-06-25 NOTE — PROGRESS NOTES
[facility]  Lung Transplant  Progress Note - Critical Care    Patient Name: Juanita Ibarra  MRN: [id number]  Admission Date: 6/17/2019  Hospital Length of Stay: 7 days  Post-Operative Day: 1839 (Lung), 7 (Lung)  Attending Physician: Jake Alvarez MD  Primary Care Provider: Primary Doctor No     Subjective:     Interval History: no acute events overnight.  Remains intubated on multiple sedatives    Continuous Infusions:   dexmedetomidine (PRECEDEX) infusion 1.4 mcg/kg/hr (06/25/19 1400)    dextrose 5 % and 0.45 % NaCl with KCl 20 mEq 5 mL/hr at 06/25/19 1400    fentanyl 150 mcg/hr (06/25/19 1400)    hydromorphone in 0.9 % NaCl 6 mg/30 ml Stopped (06/24/19 1030)    insulin (HUMAN R) infusion (adults) 1.4 Units/hr (06/25/19 1400)    ketamine (KETALAR) infusion 5 mcg/kg/min (06/25/19 1400)    nicardipine Stopped (06/24/19 0200)    propofol 50 mcg/kg/min (06/25/19 1400)     Scheduled Meds:   ciprofloxacin  400 mg Intravenous Q8H    enoxaparin  40 mg Subcutaneous Daily    fluconazole (DIFLUCAN) IVPB  400 mg Intravenous Q24H    gabapentin  250 mg Per NG tube Q8H    ganciclovir (CYTOVENE) IVPB  5 mg/kg Intravenous Q12H    hydromorphone (PF)        levalbuterol  1.25 mg Nebulization Q6H    mycophenolate (CELLCEPT) IVPB  500 mg Intravenous BID    pantoprazole  40 mg Per OG tube Daily    polyethylene glycol  17 g Per NG tube BID    predniSONE  25 mg Per OG tube Daily    Followed by    [START ON 7/3/2019] predniSONE  20 mg Oral Daily    tacrolimus  2 mg Per NG tube BID    vancomycin (VANCOCIN) IVPB  750 mg Intravenous Q12H     PRN Meds:bisacodyl, Dextrose 10% Bolus, Dextrose 10% Bolus, dextrose 50%, dextrose 50%, diphenhydrAMINE, glucagon (human recombinant), glucose, glucose, insulin aspart U-100, lactulose, levalbuterol, levalbuterol, lipase-protease-amylase (VIOKACE) 20,880-78,300- 78,300 units, magnesium sulfate IVPB, metoclopramide HCl, naloxone, ondansetron,  ondansetron, oxyCODONE, oxyCODONE, potassium chloride in water **AND** potassium chloride in water **AND** potassium chloride in water, promethazine (PHENERGAN) IVPB    Review of patient's allergies indicates:   Allergen Reactions    Albuterol Palpitations    Colistin Anaphylaxis    Vancomycin analogues      Infusion reaction that does not resolve with slowing  Pt. States she can tolerate it when given with 50 mg Benadryl and ran over 3 hours    Neupogen [filgrastim] Other (See Comments)     Ostealgia after five daily doses of 300 mcg.      Bactrim [sulfamethoxazole-trimethoprim] Hives    Ceftazidime Hives     Pt stated can tolerate cefapine not ceftazidime    Ceftazidime     Dronabinol Other (See Comments)     Mental changes/hallucinations    Haldol [haloperidol lactate] Other (See Comments)     Seizure like activity    Nsaids (non-steroidal anti-inflammatory drug)      Cannot have due to lung transplant    Adhesive Rash     Cloth tape- please use tegaderm or paper tape    Aztreonam Rash    Ciprofloxacin Nausea And Vomiting     Projectile N/V, per patient.  Unwilling to retry therapy.       Review of Systems   Unable to perform ROS: Intubated     Objective:   Physical Exam   Constitutional: She appears well-developed and well-nourished. She is easily aroused. No distress. She is sedated and intubated.   HENT:   Head: Normocephalic and atraumatic.   ETT and OGT in place   Eyes: Conjunctivae are normal. No scleral icterus.   Neck: No JVD present.   Right IJ CVC with dressing c/d/i   Cardiovascular: Regular rhythm. Tachycardia present. Exam reveals no friction rub.   No murmur heard.  Pulmonary/Chest: She is intubated. She has no decreased breath sounds. She has no wheezes. She has rhonchi. She has no rales.   Mechanical breath sounds bilaterally  Bilateral pleural and mediastinal chest tubes in place     Abdominal: Soft. She exhibits no distension. There is no tenderness.   Obese abdomen    Musculoskeletal: She exhibits no edema.   Moves all 4 extremities   Neurological: She is easily aroused.   Able to interact with staff via writing upon arousal   Skin: Skin is warm and dry. No rash noted. She is not diaphoretic.   Nursing note and vitals reviewed.        Vital Signs (Most Recent):  Temp: 98.6 °F (37 °C) (06/25/19 1400)  Pulse: (!) 113 (06/25/19 1414)  Resp: (!) 22 (06/25/19 1414)  BP: (!) 143/102 (06/25/19 1400)  SpO2: 100 % (06/25/19 1414) Vital Signs (24h Range):  Temp:  [95.2 °F (35.1 °C)-100.2 °F (37.9 °C)] 98.6 °F (37 °C)  Pulse:  [] 113  Resp:  [20-47] 22  SpO2:  [99 %-100 %] 100 %  BP: (122-189)/() 143/102     Weight: 66 kg (145 lb 8.1 oz)  Body mass index is 27.49 kg/m².      Intake/Output Summary (Last 24 hours) at 6/25/2019 1424  Last data filed at 6/25/2019 1400  Gross per 24 hour   Intake 3616 ml   Output 5210 ml   Net -1594 ml       Ventilator Data:     Vent Mode: Spont  Oxygen Concentration (%):  [40-41] 41  Resp Rate Total:  [18 br/min-48 br/min] 35 br/min  PEEP/CPAP:  [5 cmH20] 5 cmH20  Pressure Support:  [5 cmH20-8 cmH20] 5 cmH20  Mean Airway Pressure:  [6.4 cmH20-10 cmH20] 6.9 cmH20    Hemodynamic Parameters:       Lines/Drains:       Introducer right internal jugular (Active)   Specific Qualities Other (Comment) 6/25/2019 11:00 AM   Dressing Status Biopatch in place;Clean;Dry;Intact 6/25/2019 11:00 AM   Dressing Intervention Dressing reinforced 6/25/2019 11:00 AM   Dressing Change Due 06/29/19 6/25/2019 11:00 AM   Daily Line Review Performed 6/25/2019 11:00 AM   Number of days:             Port A Cath Single Lumen 06/24/14 1200 right subclavian (Active)   Accessed by: Radha Lanza 6/24/2019  5:00 AM   Dressing Type Transparent 6/25/2019 11:00 AM   Dressing Status Biopatch in place;Clean;Dry;Intact 6/25/2019 11:00 AM   Dressing Intervention Dressing reinforced 6/25/2019 11:00 AM   Dressing Change Due 06/30/19 6/25/2019 11:00 AM   Line Status Saline locked 6/25/2019  11:00 AM   Flush Performed Yes 6/24/2019  5:00 AM   Date to be Reflushed 06/18/19 6/17/2019  8:00 PM   Daily Line Review Not Performed 6/25/2019 11:00 AM   Type of Needle Nicole 6/25/2019  3:00 AM   Gauge 20 6/25/2019  3:00 AM   Needle Length 1 in 6/25/2019  3:00 AM   Needle Status Left in place 6/25/2019  3:00 AM   Needle Insertion Date 06/24/19 6/25/2019  3:00 AM   Needle Insertion Time 0500 6/25/2019  3:00 AM   Number of days: 1827            Percutaneous Central Line Insertion/Assessment - double lumen  06/18/19 0510 (Active)   Dressing biopatch in place;dressing dry and intact 6/25/2019 11:00 AM   Securement secured w/ sutures 6/25/2019 11:00 AM   Additional Site Signs no erythema;no warmth;no edema;no pain;no drainage 6/25/2019 11:00 AM   Distal Patency/Care infusing 6/25/2019 11:00 AM   Proximal Patency/Care flushed w/o difficulty;blood return present;normal saline locked 6/25/2019 11:00 AM   Waveform normal 6/25/2019 11:00 AM   Line Interventions line leveled/zeroed 6/25/2019 11:00 AM   Dressing Change Due 06/29/19 6/25/2019 11:00 AM   Daily Line Review Performed 6/25/2019 11:00 AM   Number of days: 7            Percutaneous Central Line Insertion/Assessment - triple lumen  right internal jugular (Active)   Dressing biopatch in place;dressing dry and intact 6/25/2019 11:00 AM   Securement secured w/ sutures 6/25/2019 11:00 AM   Additional Site Signs no erythema;no warmth;no edema;no pain;no drainage 6/25/2019 11:00 AM   Distal Patency/Care infusing 6/25/2019 11:00 AM   Medial Patency/Care infusing 6/25/2019 11:00 AM   Proximal Patency/Care infusing 6/25/2019 11:00 AM   Waveform normal 6/25/2019 11:00 AM   Line Interventions line leveled/zeroed 6/25/2019 11:00 AM   Dressing Change Due 06/29/19 6/25/2019 11:00 AM   Daily Line Review Performed 6/25/2019 11:00 AM   Number of days:             NG/OG Tube 06/21/19 1715 Center mouth (Active)   Placement Check placement verified by aspirate characteristics 6/25/2019  "11:00 AM   Advancement advanced manually 6/25/2019 11:00 AM   Tolerance no signs/symptoms of discomfort 6/25/2019 11:00 AM   Securement secured to commercial device 6/25/2019  3:00 AM   Clamp Status/Tolerance unclamped 6/25/2019 11:00 AM   Suction Setting/Drainage Method suction at;low;intermittent setting 6/25/2019 11:00 AM   Insertion Site Appearance no redness, warmth, tenderness, skin breakdown, drainage 6/25/2019 11:00 AM   Drainage Brown;Clear 6/23/2019  3:15 PM   Flush/Irrigation flushed w/;water;no resistance met 6/25/2019 11:00 AM   Feeding Method continuous 6/25/2019  3:00 AM   Feeding Action feeding held 6/25/2019 11:00 AM   Current Rate (mL/hr) 10 mL/hr 6/24/2019  7:00 PM   Goal Rate (mL/hr) 10 mL/hr 6/24/2019  7:00 PM   Intake (mL) 20 mL 6/25/2019 12:00 AM   Water Bolus (mL) 60 mL 6/25/2019  8:00 AM   Tube Output(mL)(Include Discarded Residual) 50 mL 6/24/2019 11:00 AM   Formula Name Impact Peptide 6/25/2019 11:00 AM   Intake (mL) - Formula Tube Feeding 0 6/25/2019 12:00 AM   Residual Amount (ml) 5 ml 6/24/2019  7:00 PM   Number of days: 3            Urethral Catheter 06/18/19 0504 Non-latex;Straight-tip;Temperature probe 14 Fr. (Active)   Site Assessment Clean;Intact 6/25/2019 11:00 AM   Collection Container Urimeter 6/25/2019 11:00 AM   Securement Method secured to top of thigh w/ adhesive device 6/25/2019 11:00 AM   Catheter Care Performed yes 6/25/2019 11:00 AM   Reason for Continuing Urinary Catheterization Critically ill in ICU requiring intensive monitoring 6/25/2019 11:00 AM   CAUTI Prevention Bundle StatLock in place w 1" slack;Intact seal between catheter & drainage tubing;Drainage bag off the floor;Green sheeting clip in use;No dependent loops or kinks;Drainage bag not overfilled (<2/3 full);Drainage bag below bladder 6/25/2019 11:00 AM   Output (mL) 350 mL 6/25/2019  2:00 PM   Number of days: 7            Y Chest Tube 1 and 2 06/18/19 1151 1 Right Mediastinal 19 Fr. 2 Right Pleural 19 Fr. " (Active)   Function -20 cm H2O 6/25/2019 11:00 AM   Air Leak/Fluctuation air leak present;fluctuation present;connections tightened 6/25/2019 11:00 AM   Safety all tubing connections taped;2 rubber-tipped hemostats w/ patient;all connections secured;suction checked 6/25/2019 11:00 AM   Securement tubing secured to body distal to insertion site w/ tape 6/25/2019 11:00 AM   Left Subcutaneous Emphysema none present 6/25/2019 11:00 AM   Right Subcutaneous Emphysema none present 6/25/2019 11:00 AM   Patency Intervention Milked;Tip/tilt 6/25/2019 11:00 AM   Drainage Description 1 Serosanguineous 6/25/2019 11:00 AM   Tube 1 Dressing Appearance occlusive gauze dressing intact;clean and dry 6/25/2019 11:00 AM   Tube 1 Dressing Care dressing reinforced 6/25/2019 11:00 AM   Site Assessment 1 Not assessed;Other (Comment) 6/25/2019 11:00 AM   Surrounding Skin 1 Unable to view;Other (Comment) 6/25/2019 11:00 AM   Drainage Description 2 Serosanguineous 6/25/2019 11:00 AM   Tube 2 Dressing Appearance occlusive gauze dressing intact;clean and dry 6/25/2019 11:00 AM   Tube 2 Dressing Care dressing reinforced 6/25/2019 11:00 AM   Site Assessment 2 Not assessed;Other (Comment) 6/25/2019 11:00 AM   Surrounding Skin Unable to view;Other (Comment) 6/25/2019 11:00 AM   Output (mL) 5 mL 6/25/2019 11:00 AM   Number of days: 7            Y Chest Tube 3 and 4 06/18/19 1151 3 Left Pleural 19 Fr. 4 Left Mediastinal 19 Fr. (Active)   Function -20 cm H2O 6/25/2019 11:00 AM   Air Leak/Fluctuation air leak present;fluctuation present;connections tightened 6/25/2019 11:00 AM   Safety all tubing connections taped;2 rubber-tipped hemostats w/ patient;all connections secured;suction checked 6/25/2019 11:00 AM   Securement tubing secured to body distal to insertion site w/ tape 6/25/2019 11:00 AM   Left Subcutaneous Emphysema none present 6/25/2019 11:00 AM   Right Subcutaneous Emphysema none present 6/25/2019 11:00 AM   Patency Intervention  Milked;Tip/tilt 6/25/2019 11:00 AM   Drainage Description 3 Serosanguineous 6/25/2019 11:00 AM   Tube 3 Dressing Appearance occlusive gauze dressing intact;clean and dry 6/25/2019 11:00 AM   Tube 3 Dressing Care dressing reinforced 6/25/2019 11:00 AM   Site Assessment 3 Not assessed;Other (Comment) 6/25/2019 11:00 AM   Surrounding Skin 3 Unable to view 6/25/2019  3:00 AM   Drainage Description 4 Serosanguineous 6/25/2019 11:00 AM   Tube 4 Dressing Appearance occlusive gauze dressing intact;clean and dry 6/25/2019 11:00 AM   Tube 4 Dressing Care dressing reinforced 6/25/2019 11:00 AM   Site Assessment 4 Not assessed;Other (Comment) 6/25/2019 11:00 AM   Surrounding Skin 4 Unable to view;Other (Comment) 6/25/2019 11:00 AM   Output (mL) 35 mL 6/25/2019 11:00 AM   Number of days: 7       Significant Labs:  CBC:  Recent Labs   Lab 06/25/19 0315   WBC 8.68   RBC 2.75*   HGB 7.6*   HCT 23.4*   *   MCV 85   MCH 27.6   MCHC 32.5     BMP:  Recent Labs   Lab 06/25/19 0315      K 4.8      CO2 25   BUN 16   CREATININE 0.7   CALCIUM 8.3*      Tacrolimus Levels:  Recent Labs   Lab 06/25/19 0315   TACROLIMUS 3.1*     Microbiology:  Microbiology Results (last 7 days)     Procedure Component Value Units Date/Time    Culture, Respiratory [[id number]] Collected:  06/25/19 0900    Order Status:  Completed Specimen:  Respiratory from Bronchial Wash Updated:  06/25/19 1307     Gram Stain (Respiratory) <10 epithelial cells per low power field.     Gram Stain (Respiratory) Many WBC's     Gram Stain (Respiratory) No organisms seen    Narrative:       Bronchial Wash    Culture, Respiratory with Gram Stain [[id number]] Collected:  06/23/19 8784    Order Status:  Completed Specimen:  Respiratory from Bronchial Wash Updated:  06/25/19 1137     Respiratory Culture --     PSEUDOMONAS AERUGINOSA  Few  Susceptibility pending  Normal respiratory anibal also present       Gram Stain (Respiratory) <10 epithelial cells per low power  field.     Gram Stain (Respiratory) Few WBC's     Gram Stain (Respiratory) Rare Gram negative rods    Fungus culture [[id number]] Collected:  06/25/19 0900    Order Status:  Sent Specimen:  Respiratory from Bronchial Wash Updated:  06/25/19 1039    AFB Culture & Smear [[id number]] Collected:  06/25/19 0900    Order Status:  Sent Specimen:  Respiratory from Bronchial Wash Updated:  06/25/19 1039    AFB Culture & Smear [[id number]] Collected:  06/23/19 1735    Order Status:  Completed Specimen:  Respiratory from Bronchial Wash Updated:  06/24/19 2127     AFB Culture & Smear Culture in progress     AFB CULTURE STAIN No acid fast bacilli seen.    Culture, Anaerobe [[id number]] Collected:  06/18/19 0930    Order Status:  Completed Specimen:  Tissue from Lung, Left Updated:  06/24/19 1107     Anaerobic Culture No anaerobes isolated    Narrative:       Donor lung    Fungus culture [[id number]] Collected:  06/23/19 1735    Order Status:  Sent Specimen:  Respiratory from Bronchial Wash Updated:  06/23/19 1913    Culture, Respiratory with Gram Stain [[id number]]  (Susceptibility) Collected:  06/20/19 0808    Order Status:  Completed Specimen:  Respiratory from Bronchial Wash Updated:  06/22/19 1018     Respiratory Culture No S aureus isolated.     Respiratory Culture --     PSEUDOMONAS AERUGINOSA   Rare       Gram Stain (Respiratory) <10 epithelial cells per low power field.     Gram Stain (Respiratory) No WBC's     Gram Stain (Respiratory) No organisms seen    AFB Culture & Smear [[id number]] Collected:  06/20/19 0808    Order Status:  Completed Specimen:  Respiratory from Bronchial Wash Updated:  06/21/19 2127     AFB Culture & Smear Culture in progress     AFB CULTURE STAIN No acid fast bacilli seen.    Fungus culture [[id number]]     Order Status:  Canceled Specimen:  Respiratory from Bronchial Wash     Culture, Respiratory with Gram Stain [[id number]]     Order Status:  Canceled Specimen:  Respiratory from Bronchial Wash      AFB Culture & Smear [[id number]]     Order Status:  Canceled Specimen:  Respiratory from Bronchial Wash     Aerobic culture [[id number]] Collected:  06/18/19 0930    Order Status:  Completed Specimen:  Tissue from Lung, Left Updated:  06/20/19 1323     Aerobic Bacterial Culture --     CANDIDA ALBICANS  Few  No other significant isolate      Narrative:       Donor lung    Fungus culture [[id number]] Collected:  06/20/19 0808    Order Status:  Sent Specimen:  Respiratory from Bronchial Wash Updated:  06/20/19 0936    AFB Culture & Smear [[id number]] Collected:  06/18/19 0930    Order Status:  Completed Specimen:  Tissue from Lung, Left Updated:  06/19/19 2127     AFB Culture & Smear Culture in progress     AFB CULTURE STAIN No acid fast bacilli seen.    Narrative:       Donor lung    Urine culture [[id number]] Collected:  06/17/19 1753    Order Status:  Completed Specimen:  Urine, Clean Catch Updated:  06/19/19 0117     Urine Culture, Routine No significant growth    Narrative:       Indicated criteria for high risk culture:->Other  Other (specify):->awaiting lung transplant          I have reviewed all pertinent labs within the past 24 hours.    Diagnostic Results:  Chest X-Ray: I personally reviewed the films and findings are:  No significant changes        Assessment/Plan:     * S/P lung transplant  POD#6 s/p bilateral lung transplant (re-transplant) for CARLOS EDUARDO. 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 Remains PGD 0. S/p bronch and extubated to HFNC on POD#2. Re-intubated on  CTS to manage chest tubes.   - Continue LPV.  Daily SAT/SBT. Failed SBT today due to tachypnea and hypercapnia, likely 2/2 anxiety   - Continue Xopenex nebs Q6 hrs. Continue immunosuppression and ppx per protocol. Continue Daily CXR.    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 Continue Ciprofloxacin, Tobramycin x1 on 6/22.   - Repeat cultures from 6/23 with NGTD.    Acute hyperactive  delirium due to another medical condition  - Likely multifactorial including post-op anesthesia, dissociative narcotics, ICU delirium  - Minimize benzodiazepine use.  PCA ordered.  Currently on Precedex Fentanyl and Propofol  - Will restart home Seroquel per OGT tomorrow.  Daily SAT    Acute blood loss anemia  - s/p 6u PRBCs, 2u plts, 1 cryo, and 4 FFP angie-operatively. Remains hemodynamically  - Conservative with future transfusions. Repeat CBC if chest tube has copious bloody output    Steroid-induced hyperglycemia  - Endocrine consulted.  Currently on Insulin gtt.  Goal BG <160    Prophylactic antibiotic  - CMV D-/R+. Continue OIP with ganciclovir, fluconazole, and dapsone.   - Bronch wash 6/20 with  Pseudomonas. Continue vancomycin given history of MRSA.   - Continue ciprofloxacin 400 mg Q8hrs. Repeat bronchial wash cultures from 6/23 with NGTD.         Immunosuppression  - Previously on tacrolimus, MMF, and daily prednisone. S/p solumedrol in OR, basiliximab on POD#0 and POD#4.   - Continue MMF, tacrolimus, and steroid taper. Tacrolimus increased to 1.5 mg BID on 6/23.   - Monitor daily tacrolimus levels and adjust dose as needed.     Chronic pain with opiate use  - Anesthesia consulted for pain mgmt. Continue aggressive bowel regimen.   - Currently on Ketamine 2mg, Fentanyl gtt. Continue Q12 Bupivacaine in TARA catheters.   - Planning to begin PCA dilaudid once extubated.  Will add and titrate meds as needed  - VBG PRN to watch for development of hypercapnia      CF related Pancreatic insufficiency  - Viokace enzymes with trickle tube feedings. Hold VioKace when tube feeds are held.        Preventive Measures: Nutrition: Goal: trickle tube feeds, Stress Ulcer: continue prophyllaxis, DVT: continue prophyllaxis, Head of Bet: elevate, Reposition: per unit routine, Physical Therapy: as tolerated, when appropriate, Sedation Interruption    Counseling/Consultation:I discussed the case with Dr. Antonio Staples  MD Tulio  Lung Transplant  [facility]

## 2019-06-25 NOTE — PROGRESS NOTES
"[facility]  Endocrinology  Progress Note    Admit Date: 6/17/2019     Reason for Consult: Management of  Hyperglycemia and CF related pancreatic insufficiency.     Surgical Procedure and Date: lung transplant in 2014; 6/18/19    HPI:   Patient is a 29 y.o. female with a diagnosis of CF, HTN, migraine headache, and osteopenia. 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 No personal history of DM. Endocrinology consulted for BG management.     Lab Results   Component Value Date    HGBA1C 4.7 01/09/2019                 Interval HPI:   Overnight events: Remains in ICU, intubated. BG above goal on transition IV insulin infusion at 1.2 u/hr with correction scale while on TF. TF suspended at midnight and BG trending down. Prednisone 25 mg daily; steroid taper per primary.   Eating:   NPO  Nausea: No  Hypoglycemia and intervention: No  Fever: No  TPN and/or TF: Yes  TF and rate: Impact Peptide at 10 cc/hr (currently on hold)    /88   Pulse 76   Temp 96.4 °F (35.8 °C)   Resp 20   Ht 5' 1" (1.549 m)   Wt 66 kg (145 lb 8.1 oz)   LMP 10/02/2016   SpO2 99%   Breastfeeding? No   BMI 27.49 kg/m²      Labs Reviewed and Include    Recent Labs   Lab 06/25/19  0315   *   CALCIUM 8.3*   ALBUMIN 2.3*   PROT 5.9*      K 4.8   CO2 25      BUN 16   CREATININE 0.7   ALKPHOS 132   ALT 30   AST 17   BILITOT 0.3     Lab Results   Component Value Date    WBC 8.68 06/25/2019    HGB 7.6 (L) 06/25/2019    HCT 23.4 (L) 06/25/2019    MCV 85 06/25/2019     (L) 06/25/2019     No results for input(s): TSH, FREET4 in the last 168 hours.  Lab Results   Component Value Date    HGBA1C 4.7 01/09/2019       Nutritional status:   Body mass index is 27.49 kg/m².  Lab Results   Component Value Date    ALBUMIN 2.3 (L) 06/25/2019    ALBUMIN 2.3 (L) 06/24/2019    ALBUMIN 2.2 (L) 06/23/2019     Lab Results   Component Value Date    PREALBUMIN 15 (L) " 05/29/2014    PREALBUMIN 11 (L) 05/09/2014    PREALBUMIN 18 (L) 03/19/2014       Estimated Creatinine Clearance: 102.2 mL/min (based on SCr of 0.7 mg/dL).    Accu-Checks  Recent Labs     06/23/19  1608 06/23/19 2013 06/24/19  0002 06/24/19  0333 06/24/19  0335 06/24/19  0839 06/24/19  1210 06/24/19 2015 06/25/19  0019 06/25/19  0316   POCTGLUCOSE 262* 339* 320* 141* 137* 177* 189* 188* 224* 184*       Current Medications and/or Treatments Impacting Glycemic Control  Immunotherapy:    Immunosuppressants         Stop Route Frequency     tacrolimus (PROGRAF) 1 mg/mL oral syringe      -- PER NG TUBE 2 times daily     mycophenolate (CELLCEPT) 500 mg in dextrose 5 % 100 mL IVPB      -- IV 2 times daily        Steroids:   Hormones (From admission, onward)    Start     Stop Route Frequency Ordered    07/03/19 0900  predniSONE tablet 20 mg      -- Oral Daily 06/24/19 0819    06/25/19 0900  predniSONE tablet 25 mg      07/03 0859 OG Daily 06/24/19 0819        Pressors:    Autonomic Drugs (From admission, onward)    None        Hyperglycemia/Diabetes Medications:   Antihyperglycemics (From admission, onward)    Start     Stop Route Frequency Ordered    06/20/19 1545  insulin regular (Humulin R) 100 Units in sodium chloride 0.9% 100 mL infusion      -- IV Continuous 06/20/19 1442    06/20/19 1542  insulin aspart U-100 pen 0-4 Units      -- SubQ As needed (PRN) 06/20/19 1442          ASSESSMENT and PLAN    * S/P lung transplant  Managed per LUT.   avoid hypoglycemia        Hyperglycemia  BG goal 140 - 180.     Increase transition IV insulin infusion to 1.4 u/hr  Low dose correction scale  BG monitoring every 4 hours while NPO    Discharge planning: TBD    CF related Pancreatic insufficiency  May impact BG.       Adrenal cortical steroids causing adverse effect in therapeutic use  Glucocorticoids markedly increase  glucoses. Expect the steroid taper will help glucose control.         Prophylactic immunotherapy  May increase  insulin resistance.       On enteral nutrition  May elevate BG readings          Deny Swenson NP  Endocrinology  [facility]

## 2019-06-25 NOTE — PROGRESS NOTES
Pt intubated.  Ventilator settings:  AC, Pressure Support, FiO2: 40%, PEEP 5, Rate: 20.   SpO2 100%, sustaining.      Pt opens eyes spontaneously.   Following all commands.   Nods/gestures appropriately.   Using communication board PRN.     Propofol gtt @ 50mcg/kg/min.   Fentanyl gtt @ 150mcg/hr.   Precedex gtt @ 1.4mcg/kg/min.   Ketamine gtt @ 5mcg/kg/min.     Pain management following pt.   Q12H Bilateral TARA catheter bolused.   CLIFF Jones MD at bedside to administer boluses.      Goal to maintain SBP <160.   CVP 5, HOB flat.   -350cc/hr.      Q4H Accuchecks.   Insulin @ 1.2 units/hr.     Plan of care reviewed with pt and spouse.   Questions answered per ICU RN.  See flowsheet for details.

## 2019-06-25 NOTE — PROGRESS NOTES
MARCELLA Alvarez MD and team at bedside.   MD notified of pts current VS, labs, gtts, rates, UOP, CVP.     Plan of care reviewed with pt and spouse.   Questions answered per MD.     Bronch completed per MARCELLA Alvarez MD.   ICU Charge notified.   RRT at bedside.  SpO2 95%, sustaining during procedure.  Sedation and pain medications titrated per comfort.   Cultures collected and sent per MD order.

## 2019-06-25 NOTE — ASSESSMENT & PLAN NOTE
BG goal 140 - 180.     Increase transition IV insulin infusion to 1.4 u/hr  Low dose correction scale  BG monitoring every 4 hours while NPO    Discharge planning: [facility]

## 2019-06-25 NOTE — PLAN OF CARE
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 Ketamine gtt started. Extubated to HFNC. OOBTC x4 hrs.  6/21: Re-intubated, bronch  6/22: tube feeds started  6/23: Bronch    Nurse:  SBP <160  Accu Q4H       * VioKace q3h while on TF only   Outcome: Ongoing (interventions implemented as appropriate)  Pt remains awake and alert. VALENCIA. Follows commands.  Ventilated, a/c 40% PEEP 5. O2 sat >95%. 96* c warming blanket. CVP 4-5 flat. SR. BP stable. Coarse breath sounds noted. Minimal serosanguinous drainage from bilat chest tubes. abd nontender, hypoactive bowel sounds noted. No nausea report. Tolerated tube feeds @ 10 ml/hr c scant residuals. Nutrition off at midnight. UO >100 ml/hr. Remains on ketamine, propofol, precedex, fentanyl, insulin, and MIVF. Minimal pain reported. Turned/repositioned q 2 hours or per patient request. Plan for possible bronch and extubation today. Pt and spouse updated on POC. See chart and doc flowsheet for details.

## 2019-06-25 NOTE — ANESTHESIA POST-OP PAIN MANAGEMENT
Bilateral TARA catheters bolused after negative aspiration.  30mL of 0.25% bupivacaine with 1:300k epi, 50mcg clonidine, and 1mg dexamethasone per side for total of 60mL.    Farhan Jones MD  PGY2, Anesthesiology

## 2019-06-25 NOTE — ANESTHESIA POST-OP PAIN MANAGEMENT
Acute Pain Service Progress Note    Juanita Ibarra is a 29 y.o., female, [id number].    Surgery:  REDO BILATERAL LUNG TRANSPLANT    Post Op Day #: 7    Catheter type: perineural  Bilateral TARA placed 6/19/2019    Hand bolus: q12H 30mL of 0.25% Bupivacaine with 1:300k epi, 1mg preservative free dexamethasone, and 50mcg clonidine per side      Problem List:    There are no hospital problems to display for this patient.      Subjective:    Patient intubated and sedated    Objective:     Bilateral catheter sites clean/dry/intact.      Vitals   There were no vitals filed for this visit.     Labs    Admission on 06/17/2019   No results displayed because visit has over 200 results.           Meds   No current facility-administered medications for this visit.      No current outpatient medications on file.     Facility-Administered Medications Ordered in Other Visits   Medication Dose Route Frequency Provider Last Rate Last Dose    bisacodyl suppository 10 mg  10 mg Rectal Daily PRN Shama Canales PA-C   10 mg at 06/23/19 1000    ciprofloxacin (CIPRO)400mg/200ml D5W IVPB 400 mg  400 mg Intravenous Q8H Jacqueline Dumont PA-C 200 mL/hr at 06/25/19 0600 400 mg at 06/25/19 0600    dexmedetomidine (PRECEDEX) 400mcg/100mL 0.9% NaCL infusion  0.2 mcg/kg/hr Intravenous Continuous Shama Canales PA-C 22.5 mL/hr at 06/25/19 0700 1.4 mcg/kg/hr at 06/25/19 0700    dextrose 10% (D10W) Bolus  12.5 g Intravenous PRN Jonathan Newby MD 1,000 mL/hr at 06/19/19 0232 125 mL at 06/19/19 0232    dextrose 10% (D10W) Bolus  25 g Intravenous PRN Jonathan Newby MD        dextrose 5 % and 0.45 % NaCl with KCl 20 mEq infusion   Intravenous Continuous Jacqueline Douglass MD 5 mL/hr at 06/25/19 0700      dextrose 50% injection 12.5 g  12.5 g Intravenous PRN Amy Zapata, NP        dextrose 50% injection 25 g  25 g Intravenous PRN Amy Zapata NP        diphenhydrAMINE injection 50 mg  50 mg Intravenous On Call  Procedure Francisca Morin,    50 mg at 06/25/19 0350    enoxaparin injection 40 mg  40 mg Subcutaneous Daily Jacqueline Dumont PA-C   40 mg at 06/24/19 1600    fentaNYL 2500 mcg in 0.9% sodium chloride 250 mL infusion premix (titrating)   Intravenous Continuous Harleen Lucinda Riley MD 15 mL/hr at 06/25/19 0700 150 mcg/hr at 06/25/19 0700    fluconazole (DIFLUCAN) IVPB 400 mg 200 mL  400 mg Intravenous Q24H Shama Canales PA-C 100 mL/hr at 06/24/19 0800 400 mg at 06/24/19 0800    gabapentin 250 mg/5 mL (5 mL) solution 250 mg  250 mg Per NG tube Q8H Kenny Merino MD   250 mg at 06/25/19 0600    ganciclovir (CYTOVENE) 295 mg in sodium chloride 0.9% 100 mL IVPB  5 mg/kg Intravenous Q12H Jacqueline Douglass  mL/hr at 06/24/19 2100 295 mg at 06/24/19 2100    glucagon (human recombinant) injection 1 mg  1 mg Intramuscular PRN Amy Zapata NP        glucose chewable tablet 16 g  16 g Oral PRN Amy Zapata NP        glucose chewable tablet 24 g  24 g Oral PRN Amy Zapata NP        HYDROmorphone PCA in 0.9 % NaCl 6 Mg/30 mL (0.2 mg/mL)   Intravenous Continuous Farhan H MD Karen   Stopped at 06/24/19 1030    insulin aspart U-100 pen 0-4 Units  0-4 Units Subcutaneous PRN Amy Zapata NP   1 Units at 06/25/19 0418    insulin regular (Humulin R) 100 Units in sodium chloride 0.9% 100 mL infusion  1.2 Units/hr Intravenous Continuous Amy Zapata NP 1.2 mL/hr at 06/25/19 0700 1.2 Units/hr at 06/25/19 0700    ketamine (KETALAR) 5,000 mg in sodium chloride 0.9% 500 mL infusion  5 mcg/kg/min Intravenous Continuous Farhan H MD Karen 2 mL/hr at 06/25/19 0700 5 mcg/kg/min at 06/25/19 0700    lactulose 20 gram/30 mL solution Soln 20 g  20 g Oral Q6H PRN Angelica Gomez MD        levalbuterol nebulizer solution 1.25 mg  1.25 mg Nebulization Q4H PRN Jacqueline Dumont PA-C   1.25 mg at 06/20/19 2023    levalbuterol nebulizer solution 1.25 mg  1.25 mg Nebulization Q6H PRN  Jacqueline Dumont PA-C   1.25 mg at 06/23/19 1627    levalbuterol nebulizer solution 1.25 mg  1.25 mg Nebulization Q6H Jacqueline Dumont PA-C   1.25 mg at 06/25/19 0720    lipase-protease-amylase (VIOKACE) 20,880-78,300- 78,300 units per tablet 1 tablet  1 tablet Oral Q3H PRN Francisca Morin DO   1 tablet at 06/24/19 1700    magnesium sulfate 2g in water 50mL IVPB (premix)  2 g Intravenous PRN Angelica Gomez MD   2 g at 06/25/19 0509    metoclopramide HCl injection 5 mg  5 mg Intravenous Q6H PRN Jacqueline Douglass MD        mycophenolate (CELLCEPT) 500 mg in dextrose 5 % 100 mL IVPB  500 mg Intravenous BID Shama Canales PA-C 50 mL/hr at 06/24/19 1700 500 mg at 06/24/19 1700    naloxone 0.4 mg/mL injection 0.02 mg  0.02 mg Intravenous PRN Riley Pennington MD        niCARdipine 40 mg/200 mL infusion  1 mg/hr Intravenous Continuous Harleen Riley MD   Stopped at 06/24/19 0200    ondansetron disintegrating tablet 8 mg  8 mg Oral Q8H PRN Jacqueline Douglass MD        ondansetron injection 4 mg  4 mg Intravenous Q6H PRN Shama Canales PA-C   Stopped at 06/20/19 1910    oxyCODONE immediate release tablet 10 mg  10 mg Oral Q4H PRN Jacqueline Douglass MD        oxyCODONE immediate release tablet 5 mg  5 mg Oral Q4H PRN Jacqueline Douglass MD        pantoprazole suspension 40 mg  40 mg Per OG tube Daily Jacqueline Dumont PA-C   40 mg at 06/24/19 1400    polyethylene glycol packet 17 g  17 g Per NG tube BID Jacqueline Dumont PA-C   17 g at 06/24/19 2100    potassium chloride 20 mEq in 100 mL IVPB (FOR CENTRAL LINE ADMINISTRATION ONLY)  20 mEq Intravenous PRN Jacqueline Douglass MD 50 mL/hr at 06/22/19 0647 20 mEq at 06/22/19 0647    And    potassium chloride 40 mEq in 100 mL IVPB (FOR CENTRAL LINE ADMINISTRATION ONLY)  40 mEq Intravenous PRN Jacqueline Douglass MD 25 mL/hr at 06/18/19 1749 40 mEq at 06/18/19 1749    And    potassium chloride 20 mEq in 100 mL IVPB (FOR  CENTRAL LINE ADMINISTRATION ONLY)  60 mEq Intravenous PRN Jacqueline Douglass MD        predniSONE tablet 25 mg  25 mg Per OG tube Daily Jake Alvarez MD        Followed by    [START ON 7/3/2019] predniSONE tablet 20 mg  20 mg Oral Daily Jake Alvarez MD        promethazine (PHENERGAN) 6.25 mg in dextrose 5 % 50 mL IVPB  6.25 mg Intravenous Q6H PRN Chai Glass MD        propofol (DIPRIVAN) 10 mg/mL infusion  15 mcg/kg/min Intravenous Continuous Jacqueline Dumont PA-C 20.8 mL/hr at 06/25/19 0700 50 mcg/kg/min at 06/25/19 0700    tacrolimus (PROGRAF) 1 mg/mL oral syringe  1.5 mg Per NG tube BID Jacqueline Dumont PA-C   1.5 mg at 06/24/19 1700    vancomycin 750 mg in dextrose 5 % 250 mL IVPB (ready to mix system)  750 mg Intravenous Q12H Francisca Morin .7 mL/hr at 06/25/19 0414 750 mg at 06/25/19 0414        Anticoagulant dose: none.    Assessment:     Pain control adequate    Plan:      Patient now s/p bilateral lung transplant on 06/18/19; Hx of significant opioid use post previous lung transplant approximately five years ago. Patient with significant opioid requirement prior to surgery from previous lung transplant. Remained intubated s/p lung transplant, requiring fentanyl with dexmedetomidine at 1.4mcg/kg/hr and propofol 50mcg/kg/min for sedation. S/P bilateral TARA catheter placement 06/19/19 w/ hand boluses Q12H at 0700 and 1900. Patient placed on ketamine infusion on 6/20 at 10 mcg/kg/min (titrated up to 25 mcg/kg/min), extubated on 6/21 but re-intubated for respiratory distress, agitation, delirium. Ketamine discontinued. Restarted low dose non-titrating 5 mcg/kg/min on 6/24/19.    - Plan to continue bilateral TARA hand bolus with a reduced concentration: 30 cc 0.25% bupivacaine w/ additives for each side q12h at 0700 and 1900. Once extubated and drips/sedation weaned, will consider transitioning to infusion pumps and adding multimodals.  - Continue ketamine at 5  mcg/kg/min. Do not titrate. Will keep on low dose ketamine to aid in weaning of fentanyl prior to extubation planned for today. Dilaudid PCA to be started on extubation.         Evaluator Farhan Jones

## 2019-06-25 NOTE — ASSESSMENT & PLAN NOTE
- s/p 6u PRBCs, 2u plts, 1 cryo, and 4 FFP angie-operatively. Remains hemodynamically  - Conservative with future transfusions. Repeat CBC if chest tube has copious bloody output

## 2019-06-25 NOTE — ASSESSMENT & PLAN NOTE
- CMV D-/R+. Continue OIP with ganciclovir, fluconazole, and dapsone.   - Bronch wash 6/20 with  Pseudomonas. Continue vancomycin given history of MRSA.   - Continue ciprofloxacin 400 mg Q8hrs. Repeat bronchial wash cultures from 6/23 with NGTD.

## 2019-06-25 NOTE — ASSESSMENT & PLAN NOTE
-  Pseudomonas from 6/20 bronchial wash. Continue Ciprofloxacin, Tobramycin x1 on 6/22.   - Repeat cultures from 6/23 with NGTD.

## 2019-06-25 NOTE — SUBJECTIVE & OBJECTIVE
"Interval HPI:   Overnight events: Remains in ICU, intubated. BG above goal on transition IV insulin infusion at 1.2 u/hr with correction scale while on TF. TF suspended at midnight and BG trending down. Prednisone 25 mg daily; steroid taper per primary.   Eating:   NPO  Nausea: No  Hypoglycemia and intervention: No  Fever: No  TPN and/or TF: Yes  TF and rate: Impact Peptide at 10 cc/hr (currently on hold)    /88   Pulse 76   Temp 96.4 °F (35.8 °C)   Resp 20   Ht 5' 1" (1.549 m)   Wt 66 kg (145 lb 8.1 oz)   LMP 10/02/2016   SpO2 99%   Breastfeeding? No   BMI 27.49 kg/m²     Labs Reviewed and Include    Recent Labs   Lab 06/25/19 0315   *   CALCIUM 8.3*   ALBUMIN 2.3*   PROT 5.9*      K 4.8   CO2 25      BUN 16   CREATININE 0.7   ALKPHOS 132   ALT 30   AST 17   BILITOT 0.3     Lab Results   Component Value Date    WBC 8.68 06/25/2019    HGB 7.6 (L) 06/25/2019    HCT 23.4 (L) 06/25/2019    MCV 85 06/25/2019     (L) 06/25/2019     No results for input(s): TSH, FREET4 in the last 168 hours.  Lab Results   Component Value Date    HGBA1C 4.7 01/09/2019       Nutritional status:   Body mass index is 27.49 kg/m².  Lab Results   Component Value Date    ALBUMIN 2.3 (L) 06/25/2019    ALBUMIN 2.3 (L) 06/24/2019    ALBUMIN 2.2 (L) 06/23/2019     Lab Results   Component Value Date    PREALBUMIN 15 (L) 05/29/2014    PREALBUMIN 11 (L) 05/09/2014    PREALBUMIN 18 (L) 03/19/2014       Estimated Creatinine Clearance: 102.2 mL/min (based on SCr of 0.7 mg/dL).    Accu-Checks  Recent Labs     06/23/19  1608 06/23/19 2013 06/24/19  0002 06/24/19  0333 06/24/19  0335 06/24/19  0839 06/24/19  1210 06/24/19 2015 06/25/19  0019 06/25/19  0316   POCTGLUCOSE 262* 339* 320* 141* 137* 177* 189* 188* 224* 184*       Current Medications and/or Treatments Impacting Glycemic Control  Immunotherapy:    Immunosuppressants         Stop Route Frequency     tacrolimus (PROGRAF) 1 mg/mL oral syringe      -- PER NG " TUBE 2 times daily     mycophenolate (CELLCEPT) 500 mg in dextrose 5 % 100 mL IVPB      -- IV 2 times daily        Steroids:   Hormones (From admission, onward)    Start     Stop Route Frequency Ordered    07/03/19 0900  predniSONE tablet 20 mg      -- Oral Daily 06/24/19 0819 06/25/19 0900  predniSONE tablet 25 mg      07/03 0859 OG Daily 06/24/19 0819        Pressors:    Autonomic Drugs (From admission, onward)    None        Hyperglycemia/Diabetes Medications:   Antihyperglycemics (From admission, onward)    Start     Stop Route Frequency Ordered    06/20/19 1545  insulin regular (Humulin R) 100 Units in sodium chloride 0.9% 100 mL infusion      -- IV Continuous 06/20/19 1442    06/20/19 1542  insulin aspart U-100 pen 0-4 Units      -- SubQ As needed (PRN) 06/20/19 1442

## 2019-06-25 NOTE — ASSESSMENT & PLAN NOTE
POD#6 s/p bilateral lung transplant (re-transplant) for [facility] EDUARDO. 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 Remains PGD 0. S/p bronch and extubated to HFNC on POD#2. Re-intubated on  CTS to manage chest tubes.   - Continue LPV.  Daily SAT/SBT. Failed SBT today due to tachypnea and hypercapnia, likely 2/2 anxiety   - Continue Xopenex nebs Q6 hrs. Continue immunosuppression and ppx per protocol. Continue Daily CXR.

## 2019-06-25 NOTE — PROGRESS NOTES
Pt on CPAP, FiO2: 40%, PEEP: 5.  Pressure Support: 5  SpO2 >98%, sustaining.  40-50 BPM noted.   Pt suctioned frequently.    Pt becoming increasingly tired.  Pt agitated, restless.   Pt beating fists on bed.   Using communication board.     VBG obtained.     -160s, sustaining.   NIBP SBP 180s, sustaining.       MARCELLA Alvarez MD and team at bedside.   ICU Charge notified.     1mg IVP Dilaudid administered per MD order.     Unable to extubate pt's parameters.   Order given per MARCELLA Alvarez MD.     Pt restarted on sedation medications.     Anesthesia Pain Management notified of plan.     Plan of care reviewed with pt and spouse.   Questions answered per MD and ICU RN.       See flowsheet for details.

## 2019-06-25 NOTE — ASSESSMENT & PLAN NOTE
- Anesthesia consulted for pain mgmt. Continue aggressive bowel regimen.   - Currently on Ketamine 2mg, Fentanyl gtt. Continue Q12 Bupivacaine in TARA catheters.   - Planning to begin PCA dilaudid once extubated.  Will add and titrate meds as needed  - VBG PRN to watch for development of hypercapnia

## 2019-06-25 NOTE — ASSESSMENT & PLAN NOTE
- Previously on tacrolimus, MMF, and daily prednisone. S/p solumedrol in OR, basiliximab on POD#0 and POD#4.   - Continue MMF, tacrolimus, and steroid taper. Tacrolimus increased to 1.5 mg BID on 6/23.   - Monitor daily tacrolimus levels and adjust dose as needed.

## 2019-06-25 NOTE — ASSESSMENT & PLAN NOTE
- Likely multifactorial including post-op anesthesia, dissociative narcotics, ICU delirium  - Minimize benzodiazepine use.  PCA ordered.  Currently on Precedex Fentanyl and Propofol  - Will restart home Seroquel per OGT tomorrow.  Daily SAT

## 2019-06-25 NOTE — SUBJECTIVE & OBJECTIVE
Subjective:     Interval History: no acute events overnight.  Remains intubated on multiple sedatives    Continuous Infusions:   dexmedetomidine (PRECEDEX) infusion 1.4 mcg/kg/hr (06/25/19 1400)    dextrose 5 % and 0.45 % NaCl with KCl 20 mEq 5 mL/hr at 06/25/19 1400    fentanyl 150 mcg/hr (06/25/19 1400)    hydromorphone in 0.9 % NaCl 6 mg/30 ml Stopped (06/24/19 1030)    insulin (HUMAN R) infusion (adults) 1.4 Units/hr (06/25/19 1400)    ketamine (KETALAR) infusion 5 mcg/kg/min (06/25/19 1400)    nicardipine Stopped (06/24/19 0200)    propofol 50 mcg/kg/min (06/25/19 1400)     Scheduled Meds:   ciprofloxacin  400 mg Intravenous Q8H    enoxaparin  40 mg Subcutaneous Daily    fluconazole (DIFLUCAN) IVPB  400 mg Intravenous Q24H    gabapentin  250 mg Per NG tube Q8H    ganciclovir (CYTOVENE) IVPB  5 mg/kg Intravenous Q12H    hydromorphone (PF)        levalbuterol  1.25 mg Nebulization Q6H    mycophenolate (CELLCEPT) IVPB  500 mg Intravenous BID    pantoprazole  40 mg Per OG tube Daily    polyethylene glycol  17 g Per NG tube BID    predniSONE  25 mg Per OG tube Daily    Followed by    [START ON 7/3/2019] predniSONE  20 mg Oral Daily    tacrolimus  2 mg Per NG tube BID    vancomycin (VANCOCIN) IVPB  750 mg Intravenous Q12H     PRN Meds:bisacodyl, Dextrose 10% Bolus, Dextrose 10% Bolus, dextrose 50%, dextrose 50%, diphenhydrAMINE, glucagon (human recombinant), glucose, glucose, insulin aspart U-100, lactulose, levalbuterol, levalbuterol, lipase-protease-amylase (VIOKACE) 20,880-78,300- 78,300 units, magnesium sulfate IVPB, metoclopramide HCl, naloxone, ondansetron, ondansetron, oxyCODONE, oxyCODONE, potassium chloride in water **AND** potassium chloride in water **AND** potassium chloride in water, promethazine (PHENERGAN) IVPB    Review of patient's allergies indicates:   Allergen Reactions    Albuterol Palpitations    Colistin Anaphylaxis    Vancomycin analogues      Infusion reaction that  does not resolve with slowing  Pt. States she can tolerate it when given with 50 mg Benadryl and ran over 3 hours    Neupogen [filgrastim] Other (See Comments)     Ostealgia after five daily doses of 300 mcg.      Bactrim [sulfamethoxazole-trimethoprim] Hives    Ceftazidime Hives     Pt stated can tolerate cefapine not ceftazidime    Ceftazidime     Dronabinol Other (See Comments)     Mental changes/hallucinations    Haldol [haloperidol lactate] Other (See Comments)     Seizure like activity    Nsaids (non-steroidal anti-inflammatory drug)      Cannot have due to lung transplant    Adhesive Rash     Cloth tape- please use tegaderm or paper tape    Aztreonam Rash    Ciprofloxacin Nausea And Vomiting     Projectile N/V, per patient.  Unwilling to retry therapy.       Review of Systems   Unable to perform ROS: Intubated     Objective:   Physical Exam   Constitutional: She appears well-developed and well-nourished. She is easily aroused. No distress. She is sedated and intubated.   HENT:   Head: Normocephalic and atraumatic.   ETT and OGT in place   Eyes: Conjunctivae are normal. No scleral icterus.   Neck: No JVD present.   Right IJ CVC with dressing c/d/i   Cardiovascular: Regular rhythm. Tachycardia present. Exam reveals no friction rub.   No murmur heard.  Pulmonary/Chest: She is intubated. She has no decreased breath sounds. She has no wheezes. She has rhonchi. She has no rales.   Mechanical breath sounds bilaterally  Bilateral pleural and mediastinal chest tubes in place     Abdominal: Soft. She exhibits no distension. There is no tenderness.   Obese abdomen   Musculoskeletal: She exhibits no edema.   Moves all 4 extremities   Neurological: She is easily aroused.   Able to interact with staff via writing upon arousal   Skin: Skin is warm and dry. No rash noted. She is not diaphoretic.   Nursing note and vitals reviewed.        Vital Signs (Most Recent):  Temp: 98.6 °F (37 °C) (06/25/19 1400)  Pulse: (!)  113 (06/25/19 1414)  Resp: (!) 22 (06/25/19 1414)  BP: (!) 143/102 (06/25/19 1400)  SpO2: 100 % (06/25/19 1414) Vital Signs (24h Range):  Temp:  [95.2 °F (35.1 °C)-100.2 °F (37.9 °C)] 98.6 °F (37 °C)  Pulse:  [] 113  Resp:  [20-47] 22  SpO2:  [99 %-100 %] 100 %  BP: (122-189)/() 143/102     Weight: 66 kg (145 lb 8.1 oz)  Body mass index is 27.49 kg/m².      Intake/Output Summary (Last 24 hours) at 6/25/2019 1424  Last data filed at 6/25/2019 1400  Gross per 24 hour   Intake 3616 ml   Output 5210 ml   Net -1594 ml       Ventilator Data:     Vent Mode: Spont  Oxygen Concentration (%):  [40-41] 41  Resp Rate Total:  [18 br/min-48 br/min] 35 br/min  PEEP/CPAP:  [5 cmH20] 5 cmH20  Pressure Support:  [5 cmH20-8 cmH20] 5 cmH20  Mean Airway Pressure:  [6.4 cmH20-10 cmH20] 6.9 cmH20    Hemodynamic Parameters:       Lines/Drains:       Introducer right internal jugular (Active)   Specific Qualities Other (Comment) 6/25/2019 11:00 AM   Dressing Status Biopatch in place;Clean;Dry;Intact 6/25/2019 11:00 AM   Dressing Intervention Dressing reinforced 6/25/2019 11:00 AM   Dressing Change Due 06/29/19 6/25/2019 11:00 AM   Daily Line Review Performed 6/25/2019 11:00 AM   Number of days:             Port A Cath Single Lumen 06/24/14 1200 right subclavian (Active)   Accessed by: Radha Lanza 6/24/2019  5:00 AM   Dressing Type Transparent 6/25/2019 11:00 AM   Dressing Status Biopatch in place;Clean;Dry;Intact 6/25/2019 11:00 AM   Dressing Intervention Dressing reinforced 6/25/2019 11:00 AM   Dressing Change Due 06/30/19 6/25/2019 11:00 AM   Line Status Saline locked 6/25/2019 11:00 AM   Flush Performed Yes 6/24/2019  5:00 AM   Date to be Reflushed 06/18/19 6/17/2019  8:00 PM   Daily Line Review Not Performed 6/25/2019 11:00 AM   Type of Needle Nicole 6/25/2019  3:00 AM   Gauge 20 6/25/2019  3:00 AM   Needle Length 1 in 6/25/2019  3:00 AM   Needle Status Left in place 6/25/2019  3:00 AM   Needle Insertion Date 06/24/19  6/25/2019  3:00 AM   Needle Insertion Time 0500 6/25/2019  3:00 AM   Number of days: 1827            Percutaneous Central Line Insertion/Assessment - double lumen  06/18/19 0510 (Active)   Dressing biopatch in place;dressing dry and intact 6/25/2019 11:00 AM   Securement secured w/ sutures 6/25/2019 11:00 AM   Additional Site Signs no erythema;no warmth;no edema;no pain;no drainage 6/25/2019 11:00 AM   Distal Patency/Care infusing 6/25/2019 11:00 AM   Proximal Patency/Care flushed w/o difficulty;blood return present;normal saline locked 6/25/2019 11:00 AM   Waveform normal 6/25/2019 11:00 AM   Line Interventions line leveled/zeroed 6/25/2019 11:00 AM   Dressing Change Due 06/29/19 6/25/2019 11:00 AM   Daily Line Review Performed 6/25/2019 11:00 AM   Number of days: 7            Percutaneous Central Line Insertion/Assessment - triple lumen  right internal jugular (Active)   Dressing biopatch in place;dressing dry and intact 6/25/2019 11:00 AM   Securement secured w/ sutures 6/25/2019 11:00 AM   Additional Site Signs no erythema;no warmth;no edema;no pain;no drainage 6/25/2019 11:00 AM   Distal Patency/Care infusing 6/25/2019 11:00 AM   Medial Patency/Care infusing 6/25/2019 11:00 AM   Proximal Patency/Care infusing 6/25/2019 11:00 AM   Waveform normal 6/25/2019 11:00 AM   Line Interventions line leveled/zeroed 6/25/2019 11:00 AM   Dressing Change Due 06/29/19 6/25/2019 11:00 AM   Daily Line Review Performed 6/25/2019 11:00 AM   Number of days:             NG/OG Tube 06/21/19 1715 Center mouth (Active)   Placement Check placement verified by aspirate characteristics 6/25/2019 11:00 AM   Advancement advanced manually 6/25/2019 11:00 AM   Tolerance no signs/symptoms of discomfort 6/25/2019 11:00 AM   Securement secured to commercial device 6/25/2019  3:00 AM   Clamp Status/Tolerance unclamped 6/25/2019 11:00 AM   Suction Setting/Drainage Method suction at;low;intermittent setting 6/25/2019 11:00 AM   Insertion Site  "Appearance no redness, warmth, tenderness, skin breakdown, drainage 6/25/2019 11:00 AM   Drainage Brown;Clear 6/23/2019  3:15 PM   Flush/Irrigation flushed w/;water;no resistance met 6/25/2019 11:00 AM   Feeding Method continuous 6/25/2019  3:00 AM   Feeding Action feeding held 6/25/2019 11:00 AM   Current Rate (mL/hr) 10 mL/hr 6/24/2019  7:00 PM   Goal Rate (mL/hr) 10 mL/hr 6/24/2019  7:00 PM   Intake (mL) 20 mL 6/25/2019 12:00 AM   Water Bolus (mL) 60 mL 6/25/2019  8:00 AM   Tube Output(mL)(Include Discarded Residual) 50 mL 6/24/2019 11:00 AM   Formula Name Impact Peptide 6/25/2019 11:00 AM   Intake (mL) - Formula Tube Feeding 0 6/25/2019 12:00 AM   Residual Amount (ml) 5 ml 6/24/2019  7:00 PM   Number of days: 3            Urethral Catheter 06/18/19 0504 Non-latex;Straight-tip;Temperature probe 14 Fr. (Active)   Site Assessment Clean;Intact 6/25/2019 11:00 AM   Collection Container Urimeter 6/25/2019 11:00 AM   Securement Method secured to top of thigh w/ adhesive device 6/25/2019 11:00 AM   Catheter Care Performed yes 6/25/2019 11:00 AM   Reason for Continuing Urinary Catheterization Critically ill in ICU requiring intensive monitoring 6/25/2019 11:00 AM   CAUTI Prevention Bundle StatLock in place w 1" slack;Intact seal between catheter & drainage tubing;Drainage bag off the floor;Green sheeting clip in use;No dependent loops or kinks;Drainage bag not overfilled (<2/3 full);Drainage bag below bladder 6/25/2019 11:00 AM   Output (mL) 350 mL 6/25/2019  2:00 PM   Number of days: 7            Y Chest Tube 1 and 2 06/18/19 1151 1 Right Mediastinal 19 Fr. 2 Right Pleural 19 Fr. (Active)   Function -20 cm H2O 6/25/2019 11:00 AM   Air Leak/Fluctuation air leak present;fluctuation present;connections tightened 6/25/2019 11:00 AM   Safety all tubing connections taped;2 rubber-tipped hemostats w/ patient;all connections secured;suction checked 6/25/2019 11:00 AM   Securement tubing secured to body distal to insertion site " w/ tape 6/25/2019 11:00 AM   Left Subcutaneous Emphysema none present 6/25/2019 11:00 AM   Right Subcutaneous Emphysema none present 6/25/2019 11:00 AM   Patency Intervention Milked;Tip/tilt 6/25/2019 11:00 AM   Drainage Description 1 Serosanguineous 6/25/2019 11:00 AM   Tube 1 Dressing Appearance occlusive gauze dressing intact;clean and dry 6/25/2019 11:00 AM   Tube 1 Dressing Care dressing reinforced 6/25/2019 11:00 AM   Site Assessment 1 Not assessed;Other (Comment) 6/25/2019 11:00 AM   Surrounding Skin 1 Unable to view;Other (Comment) 6/25/2019 11:00 AM   Drainage Description 2 Serosanguineous 6/25/2019 11:00 AM   Tube 2 Dressing Appearance occlusive gauze dressing intact;clean and dry 6/25/2019 11:00 AM   Tube 2 Dressing Care dressing reinforced 6/25/2019 11:00 AM   Site Assessment 2 Not assessed;Other (Comment) 6/25/2019 11:00 AM   Surrounding Skin Unable to view;Other (Comment) 6/25/2019 11:00 AM   Output (mL) 5 mL 6/25/2019 11:00 AM   Number of days: 7            Y Chest Tube 3 and 4 06/18/19 1151 3 Left Pleural 19 Fr. 4 Left Mediastinal 19 Fr. (Active)   Function -20 cm H2O 6/25/2019 11:00 AM   Air Leak/Fluctuation air leak present;fluctuation present;connections tightened 6/25/2019 11:00 AM   Safety all tubing connections taped;2 rubber-tipped hemostats w/ patient;all connections secured;suction checked 6/25/2019 11:00 AM   Securement tubing secured to body distal to insertion site w/ tape 6/25/2019 11:00 AM   Left Subcutaneous Emphysema none present 6/25/2019 11:00 AM   Right Subcutaneous Emphysema none present 6/25/2019 11:00 AM   Patency Intervention Milked;Tip/tilt 6/25/2019 11:00 AM   Drainage Description 3 Serosanguineous 6/25/2019 11:00 AM   Tube 3 Dressing Appearance occlusive gauze dressing intact;clean and dry 6/25/2019 11:00 AM   Tube 3 Dressing Care dressing reinforced 6/25/2019 11:00 AM   Site Assessment 3 Not assessed;Other (Comment) 6/25/2019 11:00 AM   Surrounding Skin 3 Unable to view  6/25/2019  3:00 AM   Drainage Description 4 Serosanguineous 6/25/2019 11:00 AM   Tube 4 Dressing Appearance occlusive gauze dressing intact;clean and dry 6/25/2019 11:00 AM   Tube 4 Dressing Care dressing reinforced 6/25/2019 11:00 AM   Site Assessment 4 Not assessed;Other (Comment) 6/25/2019 11:00 AM   Surrounding Skin 4 Unable to view;Other (Comment) 6/25/2019 11:00 AM   Output (mL) 35 mL 6/25/2019 11:00 AM   Number of days: 7       Significant Labs:  CBC:  Recent Labs   Lab 06/25/19 0315   WBC 8.68   RBC 2.75*   HGB 7.6*   HCT 23.4*   *   MCV 85   MCH 27.6   MCHC 32.5     BMP:  Recent Labs   Lab 06/25/19 0315      K 4.8      CO2 25   BUN 16   CREATININE 0.7   CALCIUM 8.3*      Tacrolimus Levels:  Recent Labs   Lab 06/25/19 0315   TACROLIMUS 3.1*     Microbiology:  Microbiology Results (last 7 days)     Procedure Component Value Units Date/Time    Culture, Respiratory [[id number]] Collected:  06/25/19 0900    Order Status:  Completed Specimen:  Respiratory from Bronchial Wash Updated:  06/25/19 1307     Gram Stain (Respiratory) <10 epithelial cells per low power field.     Gram Stain (Respiratory) Many WBC's     Gram Stain (Respiratory) No organisms seen    Narrative:       Bronchial Wash    Culture, Respiratory with Gram Stain [[id number]] Collected:  06/23/19 1735    Order Status:  Completed Specimen:  Respiratory from Bronchial Wash Updated:  06/25/19 1137     Respiratory Culture --     PSEUDOMONAS AERUGINOSA  Few  Susceptibility pending  Normal respiratory anibal also present       Gram Stain (Respiratory) <10 epithelial cells per low power field.     Gram Stain (Respiratory) Few WBC's     Gram Stain (Respiratory) Rare Gram negative rods    Fungus culture [[id number]] Collected:  06/25/19 0900    Order Status:  Sent Specimen:  Respiratory from Bronchial Wash Updated:  06/25/19 1039    AFB Culture & Smear [[id number]] Collected:  06/25/19 0900    Order Status:  Sent Specimen:  Respiratory from  Bronchial Wash Updated:  06/25/19 1039    AFB Culture & Smear [[id number]] Collected:  06/23/19 1735    Order Status:  Completed Specimen:  Respiratory from Bronchial Wash Updated:  06/24/19 2127     AFB Culture & Smear Culture in progress     AFB CULTURE STAIN No acid fast bacilli seen.    Culture, Anaerobe [[id number]] Collected:  06/18/19 0930    Order Status:  Completed Specimen:  Tissue from Lung, Left Updated:  06/24/19 1107     Anaerobic Culture No anaerobes isolated    Narrative:       Donor lung    Fungus culture [[id number]] Collected:  06/23/19 1735    Order Status:  Sent Specimen:  Respiratory from Bronchial Wash Updated:  06/23/19 1913    Culture, Respiratory with Gram Stain [[id number]]  (Susceptibility) Collected:  06/20/19 0808    Order Status:  Completed Specimen:  Respiratory from Bronchial Wash Updated:  06/22/19 1018     Respiratory Culture No S aureus isolated.     Respiratory Culture --     PSEUDOMONAS AERUGINOSA   Rare       Gram Stain (Respiratory) <10 epithelial cells per low power field.     Gram Stain (Respiratory) No WBC's     Gram Stain (Respiratory) No organisms seen    AFB Culture & Smear [[id number]] Collected:  06/20/19 0808    Order Status:  Completed Specimen:  Respiratory from Bronchial Wash Updated:  06/21/19 2127     AFB Culture & Smear Culture in progress     AFB CULTURE STAIN No acid fast bacilli seen.    Fungus culture [[id number]]     Order Status:  Canceled Specimen:  Respiratory from Bronchial Wash     Culture, Respiratory with Gram Stain [[id number]]     Order Status:  Canceled Specimen:  Respiratory from Bronchial Wash     AFB Culture & Smear [[id number]]     Order Status:  Canceled Specimen:  Respiratory from Bronchial Wash     Aerobic culture [[id number]] Collected:  06/18/19 0930    Order Status:  Completed Specimen:  Tissue from Lung, Left Updated:  06/20/19 1323     Aerobic Bacterial Culture --     CANDIDA ALBICANS  Few  No other significant isolate      Narrative:        Donor lung    Fungus culture [[id number]] Collected:  06/20/19 0808    Order Status:  Sent Specimen:  Respiratory from Bronchial Wash Updated:  06/20/19 0936    AFB Culture & Smear [[id number]] Collected:  06/18/19 0930    Order Status:  Completed Specimen:  Tissue from Lung, Left Updated:  06/19/19 2127     AFB Culture & Smear Culture in progress     AFB CULTURE STAIN No acid fast bacilli seen.    Narrative:       Donor lung    Urine culture [[id number]] Collected:  06/17/19 1753    Order Status:  Completed Specimen:  Urine, Clean Catch Updated:  06/19/19 0117     Urine Culture, Routine No significant growth    Narrative:       Indicated criteria for high risk culture:->Other  Other (specify):->awaiting lung transplant          I have reviewed all pertinent labs within the past 24 hours.    Diagnostic Results:  Chest X-Ray: I personally reviewed the films and findings are:  No significant changes

## 2019-06-26 NOTE — CARE UPDATE
Pt extubated to 5LNMD BERT at bedside, RN and family. No complications, will continue to monitor.

## 2019-06-26 NOTE — ASSESSMENT & PLAN NOTE
BG goal 140 - 180.     Decrease transition IV insulin infusion to 1.2 u/hr  Low dose correction scale  BG monitoring every 4 hours while NPO    ADDENDUM: Decrease transition IV insulin infusion to 0.8 u/hr    Discharge planning: [facility]

## 2019-06-26 NOTE — SUBJECTIVE & OBJECTIVE
Subjective:     Interval History: failed SBT yesterday due to tachypnea, tachycardia and hypercapnia.  Much more calm today with no acute respiratory issues overnight.    Continuous Infusions:   dexmedetomidine (PRECEDEX) infusion 1.4 mcg/kg/hr (06/26/19 1100)    dextrose 5 % and 0.45 % NaCl with KCl 20 mEq 5 mL/hr at 06/26/19 0600    fentanyl Stopped (06/26/19 0900)    hydromorphone in 0.9 % NaCl 6 mg/30 ml Stopped (06/24/19 1030)    insulin (HUMAN R) infusion (adults) 1.2 Units/hr (06/26/19 0730)    ketamine (KETALAR) infusion 5 mcg/kg/min (06/26/19 1100)    nicardipine Stopped (06/24/19 0200)    propofol Stopped (06/26/19 0830)     Scheduled Meds:   ciprofloxacin  400 mg Intravenous Q8H    enoxaparin  40 mg Subcutaneous Daily    fluconazole (DIFLUCAN) IVPB  400 mg Intravenous Q24H    gabapentin  250 mg Per NG tube Q8H    ganciclovir (CYTOVENE) IVPB  5 mg/kg Intravenous Q12H    levalbuterol  1.25 mg Nebulization Q6H    mycophenolate (CELLCEPT) IVPB  500 mg Intravenous BID    pantoprazole  40 mg Per OG tube Daily    polyethylene glycol  17 g Per NG tube BID    predniSONE  25 mg Per OG tube Daily    Followed by    [START ON 7/3/2019] predniSONE  20 mg Oral Daily    QUEtiapine  50 mg Per OG tube BID    tacrolimus  2 mg Per NG tube BID    vancomycin (VANCOCIN) IVPB  750 mg Intravenous Q12H     PRN Meds:bisacodyl, Dextrose 10% Bolus, Dextrose 10% Bolus, dextrose 50%, dextrose 50%, diphenhydrAMINE, glucagon (human recombinant), glucose, glucose, HYDROmorphone, insulin aspart U-100, lactulose, levalbuterol, levalbuterol, lipase-protease-amylase (VIOKACE) 20,880-78,300- 78,300 units, lorazepam, magnesium sulfate IVPB, metoclopramide HCl, naloxone, ondansetron, ondansetron, oxyCODONE, oxyCODONE, potassium chloride in water **AND** potassium chloride in water **AND** potassium chloride in water, promethazine (PHENERGAN) IVPB    Review of patient's allergies indicates:   Allergen Reactions    Albuterol  Palpitations    Colistin Anaphylaxis    Vancomycin analogues      Infusion reaction that does not resolve with slowing  Pt. States she can tolerate it when given with 50 mg Benadryl and ran over 3 hours    Neupogen [filgrastim] Other (See Comments)     Ostealgia after five daily doses of 300 mcg.      Bactrim [sulfamethoxazole-trimethoprim] Hives    Ceftazidime Hives     Pt stated can tolerate cefapine not ceftazidime    Ceftazidime     Dronabinol Other (See Comments)     Mental changes/hallucinations    Haldol [haloperidol lactate] Other (See Comments)     Seizure like activity    Nsaids (non-steroidal anti-inflammatory drug)      Cannot have due to lung transplant    Adhesive Rash     Cloth tape- please use tegaderm or paper tape    Aztreonam Rash    Ciprofloxacin Nausea And Vomiting     Projectile N/V, per patient.  Unwilling to retry therapy.       Review of Systems   Unable to perform ROS: Intubated   Respiratory: Positive for shortness of breath.    Gastrointestinal: Positive for abdominal pain.     Objective:   Physical Exam   Constitutional: She appears well-developed and well-nourished. She is easily aroused. No distress. She is sedated and intubated.   HENT:   Head: Normocephalic and atraumatic.   ETT and OGT in place   Eyes: Conjunctivae are normal. No scleral icterus.   Neck: No JVD present.   Right IJ CVC with dressing c/d/i   Cardiovascular: Normal rate and regular rhythm. Exam reveals no friction rub.   No murmur heard.  Pulmonary/Chest: She is intubated. No respiratory distress. She has no decreased breath sounds. She has no wheezes. She has rhonchi. She has no rales.   Mechanical breath sounds bilaterally  Bilateral pleural and mediastinal chest tubes in place     Abdominal: Soft. She exhibits no distension. There is no tenderness.   Protuberant abdomen   Musculoskeletal: She exhibits no edema.   Neurological: She is alert and easily aroused.   Skin: Skin is warm and dry. No rash noted.  She is not diaphoretic.   Nursing note and vitals reviewed.        Vital Signs (Most Recent):  Temp: 98 °F (36.7 °C) (06/26/19 1100)  Pulse: 98 (06/26/19 1215)  Resp: (!) 21 (06/26/19 1215)  BP: (!) 152/101 (06/26/19 1215)  SpO2: 100 % (06/26/19 1215) Vital Signs (24h Range):  Temp:  [58.3 °F (14.6 °C)-99.5 °F (37.5 °C)] 98 °F (36.7 °C)  Pulse:  [] 98  Resp:  [14-47] 21  SpO2:  [99 %-100 %] 100 %  BP: (108-186)/() 152/101     Weight: 63.5 kg (139 lb 15.9 oz)  Body mass index is 26.45 kg/m².      Intake/Output Summary (Last 24 hours) at 6/26/2019 1336  Last data filed at 6/26/2019 1200  Gross per 24 hour   Intake 3827 ml   Output 4716 ml   Net -889 ml       Ventilator Data:     Vent Mode: Spont  Oxygen Concentration (%):  [39-41] 39  Resp Rate Total:  [14 br/min-40 br/min] 14 br/min  PEEP/CPAP:  [5 cmH20] 5 cmH20  Pressure Support:  [5 cmH20] 5 cmH20  Mean Airway Pressure:  [6.5 cmH20-10 cmH20] 6.6 cmH20    Hemodynamic Parameters:       Lines/Drains:       Introducer right internal jugular (Active)   Specific Qualities Other (Comment) 6/26/2019 11:00 AM   Dressing Status Biopatch in place;Clean;Intact;Dry 6/26/2019 11:00 AM   Dressing Intervention Dressing reinforced 6/26/2019 11:00 AM   Dressing Change Due 06/29/19 6/26/2019  3:00 AM   Daily Line Review Performed 6/26/2019 11:00 AM   Number of days:             Port A Cath Single Lumen 06/24/14 1200 right subclavian (Active)   Accessed by: Radha Lanza 6/24/2019  5:00 AM   Dressing Type Transparent 6/26/2019 11:00 AM   Dressing Status Biopatch in place;Clean;Dry;Intact 6/26/2019 11:00 AM   Dressing Intervention Dressing reinforced 6/26/2019  3:00 AM   Dressing Change Due 06/30/19 6/26/2019  3:00 AM   Line Status Flushed;Saline locked 6/26/2019 11:00 AM   Flush Performed Yes 6/24/2019  5:00 AM   Date to be Reflushed 06/18/19 6/17/2019  8:00 PM   Daily Line Review Performed 6/26/2019 11:00 AM   Type of Needle Nicole 6/26/2019 11:00 AM   Gauge 20 6/26/2019   3:00 AM   Needle Length 1 in 6/26/2019  3:00 AM   Needle Status Left in place 6/26/2019  3:00 AM   Needle Insertion Date 06/24/19 6/26/2019  3:00 AM   Needle Insertion Time 0500 6/26/2019  3:00 AM   Number of days: 1828            Percutaneous Central Line Insertion/Assessment - double lumen  06/18/19 0510 (Active)   Dressing dressing dry and intact;biopatch in place 6/26/2019 11:00 AM   Securement secured w/ sutures 6/26/2019 11:00 AM   Additional Site Signs no drainage;no erythema;no warmth;no edema;no pain 6/26/2019 11:00 AM   Distal Patency/Care infusing 6/26/2019 11:00 AM   Proximal Patency/Care infusing 6/26/2019 11:00 AM   Waveform normal 6/26/2019 11:00 AM   Line Interventions line leveled/zeroed 6/26/2019 11:00 AM   Dressing Change Due 06/29/19 6/26/2019  3:00 AM   Daily Line Review Performed 6/26/2019 11:00 AM   Number of days: 8            Percutaneous Central Line Insertion/Assessment - triple lumen  right internal jugular (Active)   Dressing biopatch in place;dressing dry and intact 6/26/2019 11:00 AM   Securement secured w/ sutures 6/26/2019 11:00 AM   Additional Site Signs no drainage;no erythema;no warmth;no edema;no pain 6/26/2019 11:00 AM   Distal Patency/Care infusing 6/26/2019 11:00 AM   Medial Patency/Care infusing 6/26/2019 11:00 AM   Proximal Patency/Care infusing 6/26/2019 11:00 AM   Waveform normal 6/26/2019 11:00 AM   Line Interventions line leveled/zeroed 6/26/2019 11:00 AM   Dressing Change Due 06/29/19 6/26/2019  3:00 AM   Daily Line Review Performed 6/26/2019 11:00 AM   Number of days:             NG/OG Tube 06/21/19 1715 Center mouth (Active)   Placement Check placement verified by x-ray 6/26/2019 11:00 AM   Advancement advanced manually 6/25/2019  3:00 PM   Tolerance no signs/symptoms of discomfort 6/26/2019 11:00 AM   Securement secured to commercial device 6/26/2019 11:00 AM   Clamp Status/Tolerance unclamped 6/26/2019 11:00 AM   Suction Setting/Drainage Method suction  "at;low;intermittent setting 6/26/2019 11:00 AM   Insertion Site Appearance no redness, warmth, tenderness, skin breakdown, drainage 6/26/2019 11:00 AM   Drainage Bile;Brown 6/26/2019 11:00 AM   Flush/Irrigation flushed w/;water;no resistance met 6/26/2019  3:00 AM   Feeding Method continuous 6/25/2019  3:00 AM   Feeding Action feeding held 6/25/2019  3:00 PM   Current Rate (mL/hr) 10 mL/hr 6/24/2019  7:00 PM   Goal Rate (mL/hr) 10 mL/hr 6/24/2019  7:00 PM   Intake (mL) 130 mL 6/25/2019  9:08 PM   Water Bolus (mL) 60 mL 6/25/2019  8:00 AM   Tube Output(mL)(Include Discarded Residual) 100 mL 6/25/2019  3:00 PM   Formula Name Impact Peptide 6/25/2019  3:00 PM   Intake (mL) - Formula Tube Feeding 0 6/25/2019 12:00 AM   Residual Amount (ml) 0 ml 6/25/2019  7:00 PM   Number of days: 4            Urethral Catheter 06/18/19 0504 Non-latex;Straight-tip;Temperature probe 14 Fr. (Active)   Site Assessment Clean;Intact 6/26/2019 11:00 AM   Collection Container Urimeter 6/26/2019 11:00 AM   Securement Method secured to top of thigh w/ adhesive device 6/26/2019 11:00 AM   Catheter Care Performed yes 6/26/2019 11:00 AM   Reason for Continuing Urinary Catheterization Critically ill in ICU requiring intensive monitoring 6/26/2019 11:00 AM   CAUTI Prevention Bundle StatLock in place w 1" slack;Green sheeting clip in use;Drainage bag not overfilled (<2/3 full);Drainage bag below bladder;No dependent loops or kinks;Drainage bag off the floor;Intact seal between catheter & drainage tubing 6/26/2019  7:00 AM   Output (mL) 325 mL 6/26/2019 12:00 PM   Number of days: 8            Y Chest Tube 1 and 2 06/18/19 1151 1 Right Mediastinal 19 Fr. 2 Right Pleural 19 Fr. (Active)   Function -20 cm H2O 6/26/2019 11:00 AM   Air Leak/Fluctuation air leak present 6/26/2019 11:00 AM   Safety suction checked;all connections secured;2 rubber-tipped hemostats w/ patient;all tubing connections taped 6/26/2019 11:00 AM   Securement tubing secured to body " distal to insertion site w/ tape 6/26/2019 11:00 AM   Left Subcutaneous Emphysema none present 6/26/2019 11:00 AM   Right Subcutaneous Emphysema none present 6/26/2019 11:00 AM   Patency Intervention Tip/tilt 6/26/2019 11:00 AM   Drainage Description 1 Serosanguineous 6/26/2019 11:00 AM   Tube 1 Dressing Appearance occlusive gauze dressing intact 6/26/2019 11:00 AM   Tube 1 Dressing Care dressing reinforced 6/26/2019  3:00 AM   Site Assessment 1 Not assessed 6/26/2019 11:00 AM   Surrounding Skin 1 Unable to view 6/26/2019 11:00 AM   Drainage Description 2 Serosanguineous 6/26/2019 11:00 AM   Tube 2 Dressing Appearance occlusive gauze dressing intact 6/26/2019 11:00 AM   Tube 2 Dressing Care dressing reinforced 6/26/2019  3:00 AM   Site Assessment 2 Not assessed 6/26/2019 11:00 AM   Surrounding Skin Unable to view 6/26/2019 11:00 AM   Output (mL) 12 mL 6/26/2019  3:00 AM   Number of days: 8            Y Chest Tube 3 and 4 06/18/19 1151 3 Left Pleural 19 Fr. 4 Left Mediastinal 19 Fr. (Active)   Function -20 cm H2O 6/26/2019 11:00 AM   Air Leak/Fluctuation air leak present 6/26/2019 11:00 AM   Safety all tubing connections taped;2 rubber-tipped hemostats w/ patient;all connections secured;suction checked 6/26/2019 11:00 AM   Securement tubing secured to body distal to insertion site w/ tape 6/26/2019 11:00 AM   Left Subcutaneous Emphysema none present 6/26/2019 11:00 AM   Right Subcutaneous Emphysema none present 6/26/2019 11:00 AM   Patency Intervention Tip/tilt 6/26/2019 11:00 AM   Drainage Description 3 Serosanguineous 6/26/2019 11:00 AM   Tube 3 Dressing Appearance occlusive gauze dressing intact 6/26/2019 11:00 AM   Tube 3 Dressing Care dressing reinforced 6/26/2019  3:00 AM   Site Assessment 3 Not assessed 6/26/2019 11:00 AM   Surrounding Skin 3 Unable to view 6/26/2019 11:00 AM   Drainage Description 4 Serosanguineous 6/26/2019 11:00 AM   Tube 4 Dressing Appearance occlusive gauze dressing intact 6/26/2019 11:00  AM   Tube 4 Dressing Care dressing reinforced 6/25/2019  3:00 PM   Site Assessment 4 Not assessed 6/26/2019 11:00 AM   Surrounding Skin 4 Unable to view 6/26/2019 11:00 AM   Output (mL) 0 mL 6/26/2019  3:00 AM   Number of days: 8       Significant Labs:  CBC:  Recent Labs   Lab 06/26/19 0235   WBC 10.85   RBC 2.90*   HGB 8.0*   HCT 25.3*      MCV 87   MCH 27.6   MCHC 31.6*     BMP:  Recent Labs   Lab 06/26/19 0235      K 4.2      CO2 22*   BUN 18   CREATININE 0.7   CALCIUM 8.1*      Tacrolimus Levels:  Recent Labs   Lab 06/26/19 0235   TACROLIMUS 3.9*     Microbiology:  Microbiology Results (last 7 days)     Procedure Component Value Units Date/Time    Fungus culture [[id number]] Collected:  06/23/19 1735    Order Status:  Completed Specimen:  Respiratory from Bronchial Wash Updated:  06/26/19 1119     Fungus (Mycology) Culture --     YEAST   Few  Identification pending      Fungus culture [[id number]] Collected:  06/18/19 0930    Order Status:  Completed Specimen:  Tissue from Lung, Left Updated:  06/26/19 1117     Fungus (Mycology) Culture --     YEAST   Moderate  Identification pending      Narrative:       Donor lung    Fungus culture [[id number]] Collected:  06/25/19 0900    Order Status:  Completed Specimen:  Respiratory from Bronchial Wash Updated:  06/26/19 1102     Fungus (Mycology) Culture Culture in progress    Narrative:       Bronchial Wash    Fungus culture [[id number]] Collected:  06/20/19 0808    Order Status:  Completed Specimen:  Respiratory from Bronchial Wash Updated:  06/26/19 1101     Fungus (Mycology) Culture Culture in progress    Culture, Respiratory [[id number]] Collected:  06/25/19 0900    Order Status:  Completed Specimen:  Respiratory from Bronchial Wash Updated:  06/26/19 1031     Respiratory Culture --     PSEUDOMONAS AERUGINOSA  Few  Susceptibility pending       Gram Stain (Respiratory) <10 epithelial cells per low power field.     Gram Stain (Respiratory) Many WBC's      Gram Stain (Respiratory) No organisms seen    Narrative:       Bronchial Wash    Culture, Respiratory with Gram Stain [[id number]]  (Susceptibility) Collected:  06/23/19 1735    Order Status:  Completed Specimen:  Respiratory from Bronchial Wash Updated:  06/26/19 0752     Respiratory Culture No S aureus isolated.     Respiratory Culture --     PSEUDOMONAS AERUGINOSA   Few  Normal respiratory anibal also present       Gram Stain (Respiratory) <10 epithelial cells per low power field.     Gram Stain (Respiratory) Few WBC's     Gram Stain (Respiratory) Rare Gram negative rods    AFB Culture & Smear [[id number]] Collected:  06/25/19 0900    Order Status:  Sent Specimen:  Respiratory from Bronchial Wash Updated:  06/25/19 1039    AFB Culture & Smear [[id number]] Collected:  06/23/19 1735    Order Status:  Completed Specimen:  Respiratory from Bronchial Wash Updated:  06/24/19 2127     AFB Culture & Smear Culture in progress     AFB CULTURE STAIN No acid fast bacilli seen.    Culture, Anaerobe [[id number]] Collected:  06/18/19 0930    Order Status:  Completed Specimen:  Tissue from Lung, Left Updated:  06/24/19 1107     Anaerobic Culture No anaerobes isolated    Narrative:       Donor lung    Culture, Respiratory with Gram Stain [[id number]]  (Susceptibility) Collected:  06/20/19 0808    Order Status:  Completed Specimen:  Respiratory from Bronchial Wash Updated:  06/22/19 1018     Respiratory Culture No S aureus isolated.     Respiratory Culture --     PSEUDOMONAS AERUGINOSA   Rare       Gram Stain (Respiratory) <10 epithelial cells per low power field.     Gram Stain (Respiratory) No WBC's     Gram Stain (Respiratory) No organisms seen    AFB Culture & Smear [[id number]] Collected:  06/20/19 0808    Order Status:  Completed Specimen:  Respiratory from Bronchial Wash Updated:  06/21/19 2127     AFB Culture & Smear Culture in progress     AFB CULTURE STAIN No acid fast bacilli seen.    Fungus culture [[id number]]      Order Status:  Canceled Specimen:  Respiratory from Bronchial Wash     Culture, Respiratory with Gram Stain [[id number]]     Order Status:  Canceled Specimen:  Respiratory from Bronchial Wash     AFB Culture & Smear [[id number]]     Order Status:  Canceled Specimen:  Respiratory from Bronchial Wash     Aerobic culture [[id number]] Collected:  06/18/19 0930    Order Status:  Completed Specimen:  Tissue from Lung, Left Updated:  06/20/19 1323     Aerobic Bacterial Culture --     CANDIDA ALBICANS  Few  No other significant isolate      Narrative:       Donor lung    AFB Culture & Smear [[id number]] Collected:  06/18/19 0930    Order Status:  Completed Specimen:  Tissue from Lung, Left Updated:  06/19/19 2127     AFB Culture & Smear Culture in progress     AFB CULTURE STAIN No acid fast bacilli seen.    Narrative:       Donor lung          I have reviewed all pertinent labs within the past 24 hours.    Diagnostic Results:  Chest X-Ray: I personally reviewed the films and findings are: no significant interval change

## 2019-06-26 NOTE — ANESTHESIA POST-OP PAIN MANAGEMENT
Acute Pain Service Progress Note    Juanita Ibarra is a 29 y.o., female, [id number].    Surgery:  REDO BILATERAL LUNG TRANSPLANT    Post Op Day #: 8    Catheter type: perineural  Bilateral TARA placed 6/19/2019    Hand bolus: q12H 30mL of 0.25% Bupivacaine with 1:300k epi, 1mg preservative free dexamethasone, and 50mcg clonidine per side      Problem List:    There are no hospital problems to display for this patient.      Subjective:    Patient extubated 6/26/19 to HFNC    Objective:     Bilateral catheter sites clean/dry/intact.      Vitals   There were no vitals filed for this visit.     Labs    Admission on 06/17/2019   No results displayed because visit has over 200 results.           Meds   No current facility-administered medications for this visit.      No current outpatient medications on file.     Facility-Administered Medications Ordered in Other Visits   Medication Dose Route Frequency Provider Last Rate Last Dose    bisacodyl suppository 10 mg  10 mg Rectal Daily PRN Shama Canales PA-C   10 mg at 06/23/19 1000    ciprofloxacin (CIPRO)400mg/200ml D5W IVPB 400 mg  400 mg Intravenous Q8H Jacqueline Dumont PA-C 200 mL/hr at 06/26/19 0529 400 mg at 06/26/19 0529    dexmedetomidine (PRECEDEX) 400mcg/100mL 0.9% NaCL infusion  0.2 mcg/kg/hr Intravenous Continuous Shama Canales PA-C 22.5 mL/hr at 06/26/19 0600 1.4 mcg/kg/hr at 06/26/19 0600    dextrose 10% (D10W) Bolus  12.5 g Intravenous PRN Jonathan Newby MD 1,000 mL/hr at 06/19/19 0232 125 mL at 06/19/19 0232    dextrose 10% (D10W) Bolus  25 g Intravenous PRN Jonathan Newby MD        dextrose 5 % and 0.45 % NaCl with KCl 20 mEq infusion   Intravenous Continuous Jacqueline Douglass MD 5 mL/hr at 06/26/19 0600      dextrose 50% injection 12.5 g  12.5 g Intravenous PRN Amy Zapata NP        dextrose 50% injection 25 g  25 g Intravenous PRN Amy Zapata NP        diphenhydrAMINE injection 50 mg  50 mg Intravenous On  Call Procedure Francisca Morin,    50 mg at 06/26/19 0305    enoxaparin injection 40 mg  40 mg Subcutaneous Daily Jacqueline Dumont PA-C   40 mg at 06/25/19 1700    fentaNYL 2500 mcg in 0.9% sodium chloride 250 mL infusion premix (titrating)   Intravenous Continuous Harleen Riley MD   Stopped at 06/26/19 0900    fluconazole (DIFLUCAN) IVPB 400 mg 200 mL  400 mg Intravenous Q24H Shama Canales PA-C 100 mL/hr at 06/26/19 0732 400 mg at 06/26/19 0732    gabapentin 250 mg/5 mL (5 mL) solution 250 mg  250 mg Per NG tube Q8H Kenny Merino MD   250 mg at 06/26/19 0527    ganciclovir (CYTOVENE) 295 mg in sodium chloride 0.9% 100 mL IVPB  5 mg/kg Intravenous Q12H Jacqueline Douglass  mL/hr at 06/26/19 0947 295 mg at 06/26/19 0947    glucagon (human recombinant) injection 1 mg  1 mg Intramuscular PRN Amy Zapata NP        glucose chewable tablet 16 g  16 g Oral PRN Amy Zapata NP        glucose chewable tablet 24 g  24 g Oral PRN Amy Zapata NP        hydromorphone (PF) injection 2 mg  2 mg Intravenous Q1H PRN Jhoan Antunez MD        HYDROmorphone PCA in 0.9 % NaCl 6 Mg/30 mL (0.2 mg/mL)   Intravenous Continuous Farhan H MD Karen   Stopped at 06/24/19 1030    insulin aspart U-100 pen 0-4 Units  0-4 Units Subcutaneous PRN Amy Zapata NP   1 Units at 06/26/19 0822    insulin regular (Humulin R) 100 Units in sodium chloride 0.9% 100 mL infusion  1.2 Units/hr Intravenous Continuous Deny Swenson NP 1.2 mL/hr at 06/26/19 0600 1.2 Units/hr at 06/26/19 0600    ketamine (KETALAR) 5,000 mg in sodium chloride 0.9% 500 mL infusion  5 mcg/kg/min Intravenous Continuous Farhan H MD Karen 2 mL/hr at 06/26/19 0900 5 mcg/kg/min at 06/26/19 0900    lactulose 20 gram/30 mL solution Soln 20 g  20 g Oral Q6H PRN Angelica Gomez MD        levalbuterol nebulizer solution 1.25 mg  1.25 mg Nebulization Q4H PRN Jacqueline Dumont PA-C   1.25 mg at 06/20/19 2023     levalbuterol nebulizer solution 1.25 mg  1.25 mg Nebulization Q6H PRN Jacqueline Dumont PA-C   1.25 mg at 06/23/19 1627    levalbuterol nebulizer solution 1.25 mg  1.25 mg Nebulization Q6H Jacqueline Dumont PA-C   1.25 mg at 06/26/19 0735    lipase-protease-amylase (VIOKACE) 20,880-78,300- 78,300 units per tablet 1 tablet  1 tablet Oral Q3H PRN Francisca Morin DO   1 tablet at 06/24/19 1700    lorazepam (ATIVAN) injection 2 mg  2 mg Intravenous Q1H PRN Jhoan Antunez MD   2 mg at 06/26/19 0855    magnesium sulfate 2g in water 50mL IVPB (premix)  2 g Intravenous PRN Angelica Gomez MD   2 g at 06/26/19 0349    metoclopramide HCl injection 5 mg  5 mg Intravenous Q6H PRN Jacqueline Douglass MD        mycophenolate (CELLCEPT) 500 mg in dextrose 5 % 100 mL IVPB  500 mg Intravenous BID Shama Canales PA-C 50 mL/hr at 06/26/19 0732 500 mg at 06/26/19 0732    naloxone 0.4 mg/mL injection 0.02 mg  0.02 mg Intravenous PRN Riley Pennington MD        niCARdipine 40 mg/200 mL infusion  1 mg/hr Intravenous Continuous Harleen Riley MD   Stopped at 06/24/19 0200    ondansetron disintegrating tablet 8 mg  8 mg Oral Q8H PRN Jacqueline Douglass MD        ondansetron injection 4 mg  4 mg Intravenous Q6H PRN Shama Canales PA-C   Stopped at 06/20/19 1910    oxyCODONE immediate release tablet 10 mg  10 mg Oral Q4H PRN Jacqueline Douglass MD        oxyCODONE immediate release tablet 5 mg  5 mg Oral Q4H PRN Jacqueline Douglass MD        pantoprazole suspension 40 mg  40 mg Per OG tube Daily Jacqueline Dumont PA-C   40 mg at 06/25/19 0800    polyethylene glycol packet 17 g  17 g Per NG tube BID Jacqueline Dumont PA-C   17 g at 06/25/19 2104    potassium chloride 20 mEq in 100 mL IVPB (FOR CENTRAL LINE ADMINISTRATION ONLY)  20 mEq Intravenous PRN Jacqueline Douglass MD 50 mL/hr at 06/22/19 0647 20 mEq at 06/22/19 0647    And    potassium chloride 40 mEq in 100 mL IVPB (FOR CENTRAL  LINE ADMINISTRATION ONLY)  40 mEq Intravenous PRN Jacqueline Douglass MD 25 mL/hr at 06/18/19 1749 40 mEq at 06/18/19 1749    And    potassium chloride 20 mEq in 100 mL IVPB (FOR CENTRAL LINE ADMINISTRATION ONLY)  60 mEq Intravenous PRN Jacqueline Douglass MD        predniSONE tablet 25 mg  25 mg Per OG tube Daily Jake Alvarez MD   Stopped at 06/26/19 0900    Followed by    [START ON 7/3/2019] predniSONE tablet 20 mg  20 mg Oral Daily Jake Alvarez MD        promethazine (PHENERGAN) 6.25 mg in dextrose 5 % 50 mL IVPB  6.25 mg Intravenous Q6H PRN Chai HELEN Glass MD        propofol (DIPRIVAN) 10 mg/mL infusion  15 mcg/kg/min Intravenous Continuous Jacqueline Dumont PA-C   Stopped at 06/26/19 0830    QUEtiapine tablet 50 mg  50 mg Per OG tube BID Jhoan Antunez MD   50 mg at 06/26/19 0732    tacrolimus (PROGRAF) 1 mg/mL oral syringe  2 mg Per NG tube BID Jake Alvarez MD   2 mg at 06/26/19 0732    vancomycin 750 mg in dextrose 5 % 250 mL IVPB (ready to mix system)  750 mg Intravenous Q12H Francisca Morin .7 mL/hr at 06/26/19 0350 750 mg at 06/26/19 0350        Anticoagulant dose: none.    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 Patient with significant opioid requirement prior to surgery from previous lung transplant. Remained intubated s/p lung transplant, requiring fentanyl with dexmedetomidine at 1.4mcg/kg/hr and propofol 50mcg/kg/min for sedation. S/P bilateral TARA catheter placement 06/19/19 w/ hand boluses Q12H at 0700 and 1900. Patient placed on ketamine infusion on 6/20 at 10 mcg/kg/min (titrated up to 25 mcg/kg/min), extubated on 6/21 but re-intubated for respiratory distress, agitation, delirium. Ketamine discontinued. Restarted low dose non-titrating 5 mcg/kg/min on 6/24/19. Extubated 6/26/19 to HFNC.    - Plan to  continue bilateral TARA hand bolus with a reduced concentration: 30 cc 0.25% bupivacaine w/ additives for each side q12h at 0700 and 1900. Now extubated. Continue boluses for now, reassess tomorrow.  - Once able to swallow, will add multimodals.  - Continue ketamine at 5 mcg/kg/min. Do not titrate. Will keep on low dose ketamine, consider decreasing rate tomorrow 6/27  - Dilaudid PCA, 0.3mg q6min with 3mg/hr lockout. Will reassess need for basal infusion later today.         Evaluator Farhan Jones

## 2019-06-26 NOTE — SUBJECTIVE & OBJECTIVE
"Interval HPI:   Overnight events: Remains in ICU, extubated. BG well controlled overnight on transition IV insulin infusion at 1.2 u/hr with correction scale. Prednisone 25 mg daily; steroid taper per primary.  Febrile, on IV antibiotics.  Eating:   NPO  Nausea: No  Hypoglycemia and intervention: No  Fever: Yes - 100.2  TPN and/or TF: No    /77   Pulse 63   Temp 98 °F (36.7 °C) (Oral)   Resp 20   Ht 5' 1" (1.549 m)   Wt 63.5 kg (139 lb 15.9 oz)   LMP 10/02/2016   SpO2 100%   Breastfeeding? No   BMI 26.45 kg/m²     Labs Reviewed and Include    Recent Labs   Lab 06/26/19  0235   *   CALCIUM 8.1*   ALBUMIN 2.4*   PROT 5.9*      K 4.2   CO2 22*      BUN 18   CREATININE 0.7   ALKPHOS 127   ALT 31   AST 19   BILITOT 0.3     Lab Results   Component Value Date    WBC 10.85 06/26/2019    HGB 8.0 (L) 06/26/2019    HCT 25.3 (L) 06/26/2019    MCV 87 06/26/2019     06/26/2019     No results for input(s): TSH, FREET4 in the last 168 hours.  Lab Results   Component Value Date    HGBA1C 4.7 01/09/2019       Nutritional status:   Body mass index is 26.45 kg/m².  Lab Results   Component Value Date    ALBUMIN 2.4 (L) 06/26/2019    ALBUMIN 2.3 (L) 06/25/2019    ALBUMIN 2.3 (L) 06/24/2019     Lab Results   Component Value Date    PREALBUMIN 15 (L) 05/29/2014    PREALBUMIN 11 (L) 05/09/2014    PREALBUMIN 18 (L) 03/19/2014       Estimated Creatinine Clearance: 100.4 mL/min (based on SCr of 0.7 mg/dL).    Accu-Checks  Recent Labs     06/24/19  0839 06/24/19  1210 06/24/19 2015 06/25/19  0019 06/25/19  0316 06/25/19  1200 06/25/19  1728 06/25/19  2023 06/26/19  0004 06/26/19  0330   POCTGLUCOSE 177* 189* 188* 224* 184* 163* 175* 137* 151* 151*       Current Medications and/or Treatments Impacting Glycemic Control  Immunotherapy:    Immunosuppressants         Stop Route Frequency     tacrolimus (PROGRAF) 1 mg/mL oral syringe      -- PER NG TUBE 2 times daily     mycophenolate (CELLCEPT) 500 mg in " dextrose 5 % 100 mL IVPB      -- IV 2 times daily        Steroids:   Hormones (From admission, onward)    Start     Stop Route Frequency Ordered    07/03/19 0900  predniSONE tablet 20 mg      -- Oral Daily 06/24/19 0819 06/25/19 0900  predniSONE tablet 25 mg      07/03 0859 OG Daily 06/24/19 0819        Pressors:    Autonomic Drugs (From admission, onward)    None        Hyperglycemia/Diabetes Medications:   Antihyperglycemics (From admission, onward)    Start     Stop Route Frequency Ordered    06/20/19 1545  insulin regular (Humulin R) 100 Units in sodium chloride 0.9% 100 mL infusion      -- IV Continuous 06/20/19 1442    06/20/19 1542  insulin aspart U-100 pen 0-4 Units      -- SubQ As needed (PRN) 06/20/19 1442

## 2019-06-26 NOTE — ANESTHESIA POST-OP PAIN MANAGEMENT
Bilateral TARA catheters bolused after negative aspiration.  30mL of 0.25% bupivacaine with 1:300k epi, 50mcg clonidine, and 1mg dexamethasone per side for total of 60mL.

## 2019-06-26 NOTE — PROGRESS NOTES
[facility]  Lung Transplant  Progress Note - Critical Care    Patient Name: Juanita Ibarra  MRN: [id number]  Admission Date: 6/17/2019  Hospital Length of Stay: 8 days  Post-Operative Day: 1840 (Lung), 8 (Lung)  Attending Physician: Jake Alvarez MD  Primary Care Provider: Primary Doctor No     Subjective:     Interval History: failed SBT yesterday due to tachypnea, tachycardia and hypercapnia.  Much more calm today with no acute respiratory issues overnight.    Continuous Infusions:   dexmedetomidine (PRECEDEX) infusion 1.4 mcg/kg/hr (06/26/19 1100)    dextrose 5 % and 0.45 % NaCl with KCl 20 mEq 5 mL/hr at 06/26/19 0600    fentanyl Stopped (06/26/19 0900)    hydromorphone in 0.9 % NaCl 6 mg/30 ml Stopped (06/24/19 1030)    insulin (HUMAN R) infusion (adults) 1.2 Units/hr (06/26/19 0730)    ketamine (KETALAR) infusion 5 mcg/kg/min (06/26/19 1100)    nicardipine Stopped (06/24/19 0200)    propofol Stopped (06/26/19 0830)     Scheduled Meds:   ciprofloxacin  400 mg Intravenous Q8H    enoxaparin  40 mg Subcutaneous Daily    fluconazole (DIFLUCAN) IVPB  400 mg Intravenous Q24H    gabapentin  250 mg Per NG tube Q8H    ganciclovir (CYTOVENE) IVPB  5 mg/kg Intravenous Q12H    levalbuterol  1.25 mg Nebulization Q6H    mycophenolate (CELLCEPT) IVPB  500 mg Intravenous BID    pantoprazole  40 mg Per OG tube Daily    polyethylene glycol  17 g Per NG tube BID    predniSONE  25 mg Per OG tube Daily    Followed by    [START ON 7/3/2019] predniSONE  20 mg Oral Daily    QUEtiapine  50 mg Per OG tube BID    tacrolimus  2 mg Per NG tube BID    vancomycin (VANCOCIN) IVPB  750 mg Intravenous Q12H     PRN Meds:bisacodyl, Dextrose 10% Bolus, Dextrose 10% Bolus, dextrose 50%, dextrose 50%, diphenhydrAMINE, glucagon (human recombinant), glucose, glucose, HYDROmorphone, insulin aspart U-100, lactulose, levalbuterol, levalbuterol, lipase-protease-amylase (VIOKACE) 20,880-78,300- 78,300  units, lorazepam, magnesium sulfate IVPB, metoclopramide HCl, naloxone, ondansetron, ondansetron, oxyCODONE, oxyCODONE, potassium chloride in water **AND** potassium chloride in water **AND** potassium chloride in water, promethazine (PHENERGAN) IVPB    Review of patient's allergies indicates:   Allergen Reactions    Albuterol Palpitations    Colistin Anaphylaxis    Vancomycin analogues      Infusion reaction that does not resolve with slowing  Pt. States she can tolerate it when given with 50 mg Benadryl and ran over 3 hours    Neupogen [filgrastim] Other (See Comments)     Ostealgia after five daily doses of 300 mcg.      Bactrim [sulfamethoxazole-trimethoprim] Hives    Ceftazidime Hives     Pt stated can tolerate cefapine not ceftazidime    Ceftazidime     Dronabinol Other (See Comments)     Mental changes/hallucinations    Haldol [haloperidol lactate] Other (See Comments)     Seizure like activity    Nsaids (non-steroidal anti-inflammatory drug)      Cannot have due to lung transplant    Adhesive Rash     Cloth tape- please use tegaderm or paper tape    Aztreonam Rash    Ciprofloxacin Nausea And Vomiting     Projectile N/V, per patient.  Unwilling to retry therapy.       Review of Systems   Unable to perform ROS: Intubated   Respiratory: Positive for shortness of breath.    Gastrointestinal: Positive for abdominal pain.     Objective:   Physical Exam   Constitutional: She appears well-developed and well-nourished. She is easily aroused. No distress. She is sedated and intubated.   HENT:   Head: Normocephalic and atraumatic.   ETT and OGT in place   Eyes: Conjunctivae are normal. No scleral icterus.   Neck: No JVD present.   Right IJ CVC with dressing c/d/i   Cardiovascular: Normal rate and regular rhythm. Exam reveals no friction rub.   No murmur heard.  Pulmonary/Chest: She is intubated. No respiratory distress. She has no decreased breath sounds. She has no wheezes. She has rhonchi. She has no  rales.   Mechanical breath sounds bilaterally  Bilateral pleural and mediastinal chest tubes in place     Abdominal: Soft. She exhibits no distension. There is no tenderness.   Protuberant abdomen   Musculoskeletal: She exhibits no edema.   Neurological: She is alert and easily aroused.   Skin: Skin is warm and dry. No rash noted. She is not diaphoretic.   Nursing note and vitals reviewed.        Vital Signs (Most Recent):  Temp: 98 °F (36.7 °C) (06/26/19 1100)  Pulse: 98 (06/26/19 1215)  Resp: (!) 21 (06/26/19 1215)  BP: (!) 152/101 (06/26/19 1215)  SpO2: 100 % (06/26/19 1215) Vital Signs (24h Range):  Temp:  [58.3 °F (14.6 °C)-99.5 °F (37.5 °C)] 98 °F (36.7 °C)  Pulse:  [] 98  Resp:  [14-47] 21  SpO2:  [99 %-100 %] 100 %  BP: (108-186)/() 152/101     Weight: 63.5 kg (139 lb 15.9 oz)  Body mass index is 26.45 kg/m².      Intake/Output Summary (Last 24 hours) at 6/26/2019 1336  Last data filed at 6/26/2019 1200  Gross per 24 hour   Intake 3827 ml   Output 4716 ml   Net -889 ml       Ventilator Data:     Vent Mode: Spont  Oxygen Concentration (%):  [39-41] 39  Resp Rate Total:  [14 br/min-40 br/min] 14 br/min  PEEP/CPAP:  [5 cmH20] 5 cmH20  Pressure Support:  [5 cmH20] 5 cmH20  Mean Airway Pressure:  [6.5 cmH20-10 cmH20] 6.6 cmH20    Hemodynamic Parameters:       Lines/Drains:       Introducer right internal jugular (Active)   Specific Qualities Other (Comment) 6/26/2019 11:00 AM   Dressing Status Biopatch in place;Clean;Intact;Dry 6/26/2019 11:00 AM   Dressing Intervention Dressing reinforced 6/26/2019 11:00 AM   Dressing Change Due 06/29/19 6/26/2019  3:00 AM   Daily Line Review Performed 6/26/2019 11:00 AM   Number of days:             Port A Cath Single Lumen 06/24/14 1200 right subclavian (Active)   Accessed by: Radha Lanza 6/24/2019  5:00 AM   Dressing Type Transparent 6/26/2019 11:00 AM   Dressing Status Biopatch in place;Clean;Dry;Intact 6/26/2019 11:00 AM   Dressing Intervention Dressing  reinforced 6/26/2019  3:00 AM   Dressing Change Due 06/30/19 6/26/2019  3:00 AM   Line Status Flushed;Saline locked 6/26/2019 11:00 AM   Flush Performed Yes 6/24/2019  5:00 AM   Date to be Reflushed 06/18/19 6/17/2019  8:00 PM   Daily Line Review Performed 6/26/2019 11:00 AM   Type of Needle Nicole 6/26/2019 11:00 AM   Gauge 20 6/26/2019  3:00 AM   Needle Length 1 in 6/26/2019  3:00 AM   Needle Status Left in place 6/26/2019  3:00 AM   Needle Insertion Date 06/24/19 6/26/2019  3:00 AM   Needle Insertion Time 0500 6/26/2019  3:00 AM   Number of days: 1828            Percutaneous Central Line Insertion/Assessment - double lumen  06/18/19 0510 (Active)   Dressing dressing dry and intact;biopatch in place 6/26/2019 11:00 AM   Securement secured w/ sutures 6/26/2019 11:00 AM   Additional Site Signs no drainage;no erythema;no warmth;no edema;no pain 6/26/2019 11:00 AM   Distal Patency/Care infusing 6/26/2019 11:00 AM   Proximal Patency/Care infusing 6/26/2019 11:00 AM   Waveform normal 6/26/2019 11:00 AM   Line Interventions line leveled/zeroed 6/26/2019 11:00 AM   Dressing Change Due 06/29/19 6/26/2019  3:00 AM   Daily Line Review Performed 6/26/2019 11:00 AM   Number of days: 8            Percutaneous Central Line Insertion/Assessment - triple lumen  right internal jugular (Active)   Dressing biopatch in place;dressing dry and intact 6/26/2019 11:00 AM   Securement secured w/ sutures 6/26/2019 11:00 AM   Additional Site Signs no drainage;no erythema;no warmth;no edema;no pain 6/26/2019 11:00 AM   Distal Patency/Care infusing 6/26/2019 11:00 AM   Medial Patency/Care infusing 6/26/2019 11:00 AM   Proximal Patency/Care infusing 6/26/2019 11:00 AM   Waveform normal 6/26/2019 11:00 AM   Line Interventions line leveled/zeroed 6/26/2019 11:00 AM   Dressing Change Due 06/29/19 6/26/2019  3:00 AM   Daily Line Review Performed 6/26/2019 11:00 AM   Number of days:             NG/OG Tube 06/21/19 1715 Center mouth (Active)  "  Placement Check placement verified by x-ray 6/26/2019 11:00 AM   Advancement advanced manually 6/25/2019  3:00 PM   Tolerance no signs/symptoms of discomfort 6/26/2019 11:00 AM   Securement secured to commercial device 6/26/2019 11:00 AM   Clamp Status/Tolerance unclamped 6/26/2019 11:00 AM   Suction Setting/Drainage Method suction at;low;intermittent setting 6/26/2019 11:00 AM   Insertion Site Appearance no redness, warmth, tenderness, skin breakdown, drainage 6/26/2019 11:00 AM   Drainage Bile;Brown 6/26/2019 11:00 AM   Flush/Irrigation flushed w/;water;no resistance met 6/26/2019  3:00 AM   Feeding Method continuous 6/25/2019  3:00 AM   Feeding Action feeding held 6/25/2019  3:00 PM   Current Rate (mL/hr) 10 mL/hr 6/24/2019  7:00 PM   Goal Rate (mL/hr) 10 mL/hr 6/24/2019  7:00 PM   Intake (mL) 130 mL 6/25/2019  9:08 PM   Water Bolus (mL) 60 mL 6/25/2019  8:00 AM   Tube Output(mL)(Include Discarded Residual) 100 mL 6/25/2019  3:00 PM   Formula Name Impact Peptide 6/25/2019  3:00 PM   Intake (mL) - Formula Tube Feeding 0 6/25/2019 12:00 AM   Residual Amount (ml) 0 ml 6/25/2019  7:00 PM   Number of days: 4            Urethral Catheter 06/18/19 0504 Non-latex;Straight-tip;Temperature probe 14 Fr. (Active)   Site Assessment Clean;Intact 6/26/2019 11:00 AM   Collection Container Urimeter 6/26/2019 11:00 AM   Securement Method secured to top of thigh w/ adhesive device 6/26/2019 11:00 AM   Catheter Care Performed yes 6/26/2019 11:00 AM   Reason for Continuing Urinary Catheterization Critically ill in ICU requiring intensive monitoring 6/26/2019 11:00 AM   CAUTI Prevention Bundle StatLock in place w 1" slack;Green sheeting clip in use;Drainage bag not overfilled (<2/3 full);Drainage bag below bladder;No dependent loops or kinks;Drainage bag off the floor;Intact seal between catheter & drainage tubing 6/26/2019  7:00 AM   Output (mL) 325 mL 6/26/2019 12:00 PM   Number of days: 8            Y Chest Tube 1 and 2 06/18/19 " 1151 1 Right Mediastinal 19 Fr. 2 Right Pleural 19 Fr. (Active)   Function -20 cm H2O 6/26/2019 11:00 AM   Air Leak/Fluctuation air leak present 6/26/2019 11:00 AM   Safety suction checked;all connections secured;2 rubber-tipped hemostats w/ patient;all tubing connections taped 6/26/2019 11:00 AM   Securement tubing secured to body distal to insertion site w/ tape 6/26/2019 11:00 AM   Left Subcutaneous Emphysema none present 6/26/2019 11:00 AM   Right Subcutaneous Emphysema none present 6/26/2019 11:00 AM   Patency Intervention Tip/tilt 6/26/2019 11:00 AM   Drainage Description 1 Serosanguineous 6/26/2019 11:00 AM   Tube 1 Dressing Appearance occlusive gauze dressing intact 6/26/2019 11:00 AM   Tube 1 Dressing Care dressing reinforced 6/26/2019  3:00 AM   Site Assessment 1 Not assessed 6/26/2019 11:00 AM   Surrounding Skin 1 Unable to view 6/26/2019 11:00 AM   Drainage Description 2 Serosanguineous 6/26/2019 11:00 AM   Tube 2 Dressing Appearance occlusive gauze dressing intact 6/26/2019 11:00 AM   Tube 2 Dressing Care dressing reinforced 6/26/2019  3:00 AM   Site Assessment 2 Not assessed 6/26/2019 11:00 AM   Surrounding Skin Unable to view 6/26/2019 11:00 AM   Output (mL) 12 mL 6/26/2019  3:00 AM   Number of days: 8            Y Chest Tube 3 and 4 06/18/19 1151 3 Left Pleural 19 Fr. 4 Left Mediastinal 19 Fr. (Active)   Function -20 cm H2O 6/26/2019 11:00 AM   Air Leak/Fluctuation air leak present 6/26/2019 11:00 AM   Safety all tubing connections taped;2 rubber-tipped hemostats w/ patient;all connections secured;suction checked 6/26/2019 11:00 AM   Securement tubing secured to body distal to insertion site w/ tape 6/26/2019 11:00 AM   Left Subcutaneous Emphysema none present 6/26/2019 11:00 AM   Right Subcutaneous Emphysema none present 6/26/2019 11:00 AM   Patency Intervention Tip/tilt 6/26/2019 11:00 AM   Drainage Description 3 Serosanguineous 6/26/2019 11:00 AM   Tube 3 Dressing Appearance occlusive gauze  dressing intact 6/26/2019 11:00 AM   Tube 3 Dressing Care dressing reinforced 6/26/2019  3:00 AM   Site Assessment 3 Not assessed 6/26/2019 11:00 AM   Surrounding Skin 3 Unable to view 6/26/2019 11:00 AM   Drainage Description 4 Serosanguineous 6/26/2019 11:00 AM   Tube 4 Dressing Appearance occlusive gauze dressing intact 6/26/2019 11:00 AM   Tube 4 Dressing Care dressing reinforced 6/25/2019  3:00 PM   Site Assessment 4 Not assessed 6/26/2019 11:00 AM   Surrounding Skin 4 Unable to view 6/26/2019 11:00 AM   Output (mL) 0 mL 6/26/2019  3:00 AM   Number of days: 8       Significant Labs:  CBC:  Recent Labs   Lab 06/26/19 0235   WBC 10.85   RBC 2.90*   HGB 8.0*   HCT 25.3*      MCV 87   MCH 27.6   MCHC 31.6*     BMP:  Recent Labs   Lab 06/26/19 0235      K 4.2      CO2 22*   BUN 18   CREATININE 0.7   CALCIUM 8.1*      Tacrolimus Levels:  Recent Labs   Lab 06/26/19  0235   TACROLIMUS 3.9*     Microbiology:  Microbiology Results (last 7 days)     Procedure Component Value Units Date/Time    Fungus culture [[id number]] Collected:  06/23/19 1735    Order Status:  Completed Specimen:  Respiratory from Bronchial Wash Updated:  06/26/19 1119     Fungus (Mycology) Culture --     YEAST   Few  Identification pending      Fungus culture [[id number]] Collected:  06/18/19 0930    Order Status:  Completed Specimen:  Tissue from Lung, Left Updated:  06/26/19 1117     Fungus (Mycology) Culture --     YEAST   Moderate  Identification pending      Narrative:       Donor lung    Fungus culture [[id number]] Collected:  06/25/19 0900    Order Status:  Completed Specimen:  Respiratory from Bronchial Wash Updated:  06/26/19 1102     Fungus (Mycology) Culture Culture in progress    Narrative:       Bronchial Wash    Fungus culture [[id number]] Collected:  06/20/19 0808    Order Status:  Completed Specimen:  Respiratory from Bronchial Wash Updated:  06/26/19 1101     Fungus (Mycology) Culture Culture in progress    Culture,  Respiratory [[id number]] Collected:  06/25/19 0900    Order Status:  Completed Specimen:  Respiratory from Bronchial Wash Updated:  06/26/19 1031     Respiratory Culture --     PSEUDOMONAS AERUGINOSA  Few  Susceptibility pending       Gram Stain (Respiratory) <10 epithelial cells per low power field.     Gram Stain (Respiratory) Many WBC's     Gram Stain (Respiratory) No organisms seen    Narrative:       Bronchial Wash    Culture, Respiratory with Gram Stain [[id number]]  (Susceptibility) Collected:  06/23/19 1735    Order Status:  Completed Specimen:  Respiratory from Bronchial Wash Updated:  06/26/19 0752     Respiratory Culture No S aureus isolated.     Respiratory Culture --     PSEUDOMONAS AERUGINOSA   Few  Normal respiratory anibal also present       Gram Stain (Respiratory) <10 epithelial cells per low power field.     Gram Stain (Respiratory) Few WBC's     Gram Stain (Respiratory) Rare Gram negative rods    AFB Culture & Smear [[id number]] Collected:  06/25/19 0900    Order Status:  Sent Specimen:  Respiratory from Bronchial Wash Updated:  06/25/19 1039    AFB Culture & Smear [[id number]] Collected:  06/23/19 1735    Order Status:  Completed Specimen:  Respiratory from Bronchial Wash Updated:  06/24/19 2127     AFB Culture & Smear Culture in progress     AFB CULTURE STAIN No acid fast bacilli seen.    Culture, Anaerobe [[id number]] Collected:  06/18/19 0930    Order Status:  Completed Specimen:  Tissue from Lung, Left Updated:  06/24/19 1107     Anaerobic Culture No anaerobes isolated    Narrative:       Donor lung    Culture, Respiratory with Gram Stain [[id number]]  (Susceptibility) Collected:  06/20/19 0808    Order Status:  Completed Specimen:  Respiratory from Bronchial Wash Updated:  06/22/19 1018     Respiratory Culture No S aureus isolated.     Respiratory Culture --     PSEUDOMONAS AERUGINOSA   Rare       Gram Stain (Respiratory) <10 epithelial cells per low power field.     Gram Stain (Respiratory)  No WBC's     Gram Stain (Respiratory) No organisms seen    AFB Culture & Smear [[id number]] Collected:  06/20/19 0808    Order Status:  Completed Specimen:  Respiratory from Bronchial Wash Updated:  06/21/19 2127     AFB Culture & Smear Culture in progress     AFB CULTURE STAIN No acid fast bacilli seen.    Fungus culture [[id number]]     Order Status:  Canceled Specimen:  Respiratory from Bronchial Wash     Culture, Respiratory with Gram Stain [[id number]]     Order Status:  Canceled Specimen:  Respiratory from Bronchial Wash     AFB Culture & Smear [[id number]]     Order Status:  Canceled Specimen:  Respiratory from Bronchial Wash     Aerobic culture [[id number]] Collected:  06/18/19 0930    Order Status:  Completed Specimen:  Tissue from Lung, Left Updated:  06/20/19 1323     Aerobic Bacterial Culture --     CANDIDA ALBICANS  Few  No other significant isolate      Narrative:       Donor lung    AFB Culture & Smear [[id number]] Collected:  06/18/19 0930    Order Status:  Completed Specimen:  Tissue from Lung, Left Updated:  06/19/19 2127     AFB Culture & Smear Culture in progress     AFB CULTURE STAIN No acid fast bacilli seen.    Narrative:       Donor lung          I have reviewed all pertinent labs within the past 24 hours.    Diagnostic Results:  Chest X-Ray: I personally reviewed the films and findings are: no significant interval change        Assessment/Plan:     * S/P lung transplant  POD#6 s/p bilateral lung transplant (re-transplant) for CARLOS EDUARDO. 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 Remains PGD 0. S/p bronch and extubated to HFNC on POD#2. Re-intubated on  CTS to manage chest tubes.   - Continue LPV.  Daily SAT/SBT. Failed SBT today due to tachypnea and hypercapnia, likely 2/2 anxiety   - Continue Xopenex nebs Q6 hrs. Continue immunosuppression and ppx per protocol. Continue Daily CXR.    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 Continue  Ciprofloxacin, Tobramycin x1 on 6/22.   - Repeat cultures from 6/23 with NGTD.    Acute hyperactive delirium due to another medical condition  - Likely multifactorial including post-op anesthesia, dissociative narcotics, ICU delirium  - Minimize benzodiazepine use.  PCA ordered.  Currently on Precedex Fentanyl and Propofol, low dose Ketamine  - Restarted home Seroquel per OGT today.  Daily SAT    Acute blood loss anemia  - s/p 6u PRBCs, 2u plts, 1 cryo, and 4 FFP angie-operatively. Remains hemodynamically  - Conservative with future transfusions. Repeat CBC if chest tube has copious bloody output    Steroid-induced hyperglycemia  - Endocrine consulted.  Currently on Insulin gtt.  Goal BG <160    Prophylactic antibiotic  - CMV D-/R+. Continue OIP with ganciclovir, fluconazole, and dapsone.   - Bronch wash 6/20 with  Pseudomonas. Continue vancomycin given history of MRSA.   - Continue ciprofloxacin 400 mg Q8hrs. Repeat bronchial wash cultures from 6/23 with NGTD.         Immunosuppression  - Previously on tacrolimus, MMF, and daily prednisone. S/p solumedrol in OR, basiliximab on POD#0 and POD#4.   - Continue MMF, tacrolimus, and steroid taper. Tacrolimus increased to 1.5 mg BID on 6/23.   - Monitor daily tacrolimus levels and adjust dose as needed.     Chronic pain with opiate use  - Anesthesia consulted for pain mgmt. Continue aggressive bowel regimen.   - Currently on Ketamine 2mg, Fentanyl gtt. Continue Q12 Bupivacaine in TARA catheters.   - Planning to begin PCA dilaudid once extubated.  Will add and titrate meds as needed  - VBG PRN to watch for development of hypercapnia      CF related Pancreatic insufficiency  - Viokace enzymes with trickle tube feedings. Hold VioKace when tube feeds are held.      Preventive Measures: Nutrition: Goal: tube feeds, Stress Ulcer: continue prophyllaxis, DVT: continue prophyllaxis, Head of Bet: elevate, Reposition: per unit routine, Physical Therapy: as tolerated when  appropriate    Counseling/Consultation:I discussed the case with Dr. Antonio Antunez MD  Lung Transplant  [facility]

## 2019-06-26 NOTE — PLAN OF CARE
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 Ketamine gtt started. Extubated to HFNC. OOBTC x4 hrs.  6/21: Re-intubated, bronch  6/22: tube feeds started  6/23: Bronch  6/24: Ketamine gtt restarted  6/25: Bronch    Nurse:  SBP <160  Accu Q4H       * VioKace q3h while on TF only    Outcome: Ongoing (interventions implemented as appropriate)  Pt remains awake and alert. VALENCIA. Follows commands.  Ventilated, a/c 40% PEEP 5. O2 sat >95%.afebrile. CVP 5 flat. SR. BP stable. Coarse breath sounds noted. Minimal serosanguinous drainage from bilat chest tubes. abd nontender, normoactive bowel sounds noted. OGT to LIWS. UO >100 ml/hr. Remains on ketamine, propofol, precedex, fentanyl, insulin, and MIVF. Minimal pain reported. Turned/repositioned q 2 hours or per patient request. Plan for SBT and  extubation today. Pt and spouse updated on POC. See chart and doc flowsheet for details.

## 2019-06-26 NOTE — ASSESSMENT & PLAN NOTE
- Likely multifactorial including post-op anesthesia, dissociative narcotics, ICU delirium  - Minimize benzodiazepine use.  PCA ordered.  Currently on Precedex Fentanyl and Propofol, low dose Ketamine  - Restarted home Seroquel per OGT today.  Daily SAT

## 2019-06-26 NOTE — PLAN OF CARE
Problem: Adult Inpatient Plan of Care  Goal: Plan of Care Review  Outcome: Ongoing (interventions implemented as appropriate)  Propofol and fentanyl gtts weaned off, pt extubated. precedex and ketamine gtts remain the same. Dilaudid PCA and PRN ativan started for pain and anxiety. Oxygen weaned throughout the day, currently on 2LNC, O2 sats 100%. Pt OOB to chair.

## 2019-06-26 NOTE — PROGRESS NOTES
"[facility]  Endocrinology  Progress Note    Admit Date: 6/17/2019     Reason for Consult: Management of  Hyperglycemia and CF related pancreatic insufficiency.     Surgical Procedure and Date: lung transplant in 2014; 6/18/19    HPI:   Patient is a 29 y.o. female with a diagnosis of CF, HTN, migraine headache, and osteopenia. 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 No personal history of DM. Endocrinology consulted for BG management.     Lab Results   Component Value Date    HGBA1C 4.7 01/09/2019                 Interval HPI:   Overnight events: Remains in ICU, extubated. BG well controlled overnight on transition IV insulin infusion at 1.2 u/hr with correction scale. Prednisone 25 mg daily; steroid taper per primary.  Febrile, on IV antibiotics.  Eating:   NPO  Nausea: No  Hypoglycemia and intervention: No  Fever: Yes - 100.2  TPN and/or TF: No    /77   Pulse 63   Temp 98 °F (36.7 °C) (Oral)   Resp 20   Ht 5' 1" (1.549 m)   Wt 63.5 kg (139 lb 15.9 oz)   LMP 10/02/2016   SpO2 100%   Breastfeeding? No   BMI 26.45 kg/m²      Labs Reviewed and Include    Recent Labs   Lab 06/26/19  0235   *   CALCIUM 8.1*   ALBUMIN 2.4*   PROT 5.9*      K 4.2   CO2 22*      BUN 18   CREATININE 0.7   ALKPHOS 127   ALT 31   AST 19   BILITOT 0.3     Lab Results   Component Value Date    WBC 10.85 06/26/2019    HGB 8.0 (L) 06/26/2019    HCT 25.3 (L) 06/26/2019    MCV 87 06/26/2019     06/26/2019     No results for input(s): TSH, FREET4 in the last 168 hours.  Lab Results   Component Value Date    HGBA1C 4.7 01/09/2019       Nutritional status:   Body mass index is 26.45 kg/m².  Lab Results   Component Value Date    ALBUMIN 2.4 (L) 06/26/2019    ALBUMIN 2.3 (L) 06/25/2019    ALBUMIN 2.3 (L) 06/24/2019     Lab Results   Component Value Date    PREALBUMIN 15 (L) 05/29/2014    PREALBUMIN 11 (L) 05/09/2014    PREALBUMIN 18 (L) " 03/19/2014       Estimated Creatinine Clearance: 100.4 mL/min (based on SCr of 0.7 mg/dL).    Accu-Checks  Recent Labs     06/24/19  0839 06/24/19  1210 06/24/19 2015 06/25/19  0019 06/25/19  0316 06/25/19  1200 06/25/19  1728 06/25/19  2023 06/26/19  0004 06/26/19  0330   POCTGLUCOSE 177* 189* 188* 224* 184* 163* 175* 137* 151* 151*       Current Medications and/or Treatments Impacting Glycemic Control  Immunotherapy:    Immunosuppressants         Stop Route Frequency     tacrolimus (PROGRAF) 1 mg/mL oral syringe      -- PER NG TUBE 2 times daily     mycophenolate (CELLCEPT) 500 mg in dextrose 5 % 100 mL IVPB      -- IV 2 times daily        Steroids:   Hormones (From admission, onward)    Start     Stop Route Frequency Ordered    07/03/19 0900  predniSONE tablet 20 mg      -- Oral Daily 06/24/19 0819    06/25/19 0900  predniSONE tablet 25 mg      07/03 0859 OG Daily 06/24/19 0819        Pressors:    Autonomic Drugs (From admission, onward)    None        Hyperglycemia/Diabetes Medications:   Antihyperglycemics (From admission, onward)    Start     Stop Route Frequency Ordered    06/20/19 1545  insulin regular (Humulin R) 100 Units in sodium chloride 0.9% 100 mL infusion      -- IV Continuous 06/20/19 1442    06/20/19 1542  insulin aspart U-100 pen 0-4 Units      -- SubQ As needed (PRN) 06/20/19 1442          ASSESSMENT and PLAN    * S/P lung transplant  Managed per LUT.   avoid hypoglycemia        Hyperglycemia  BG goal 140 - 180.     Decrease transition IV insulin infusion to 1.2 u/hr  Low dose correction scale  BG monitoring every 4 hours while NPO    ADDENDUM: Decrease transition IV insulin infusion to 0.8 u/hr    Discharge planning: TBD    CF related Pancreatic insufficiency  May impact BG.       Adrenal cortical steroids causing adverse effect in therapeutic use  Glucocorticoids markedly increase  glucoses. Expect the steroid taper will help glucose control.         Prophylactic immunotherapy  May increase  insulin resistance.       On enteral nutrition  May elevate BG readings          Deny Swenson NP  Endocrinology  [facility]

## 2019-06-27 NOTE — PLAN OF CARE
Problem: Adult Inpatient Plan of Care  Goal: Plan of Care Review  Outcome: Ongoing (interventions implemented as appropriate)  Pt back to bed from chair at 2040, all VSS. Afebrile, SBP <160, NSR-sinus tachy, O2 sats >95% on 2L NC, CVP flat 3. AAOx4. Gtts: Precedex @ 1.4 mcg/kg/hr, Ketamine @ 5 mcg/kg/min, dilaudid PCA, MIVF @5. Insulin gtt off at 1930. Adequate UOP via saleh. Minimal serosanguineous output from all chest tubes. Zofran given for complaints of nausea. Dr. Alvarez to bedside after called per 's request due to pt not able to sleep. Per orders, robaxin and acetaminophen given. Benadryl given before vanc administration. 2.5 mg valium given and pt able to sleep. SSI given per orders. POC reviewed with pt and  and all questions answered. See flowsheets for full assessment data. Will continue to monitor.

## 2019-06-27 NOTE — ASSESSMENT & PLAN NOTE
- Likely multifactorial including post-op anesthesia, dissociative narcotics, ICU delirium  - Minimize benzodiazepine use.  PCA ordered.  Currently on Precedex and low dose Ketamine  - Restarted home Seroquel per OGT. Continue weaning gtts per Anesthesia

## 2019-06-27 NOTE — PROGRESS NOTES
[facility]  Lung Transplant  Progress Note - Critical Care    Patient Name: Juanita Ibarra  MRN: [id number]  Admission Date: 6/17/2019  Hospital Length of Stay: 9 days  Post-Operative Day: 1841 (Lung), 9 (Lung)  Attending Physician: Jake Alvarez MD  Primary Care Provider: Primary Doctor No     Subjective:     Interval History: Doing well from respiratory standpoint since extubation.  Emotionally labile this morning and crying frequently.  Otherwise, she is reporting only moderate pain but is substantially anxious.    Continuous Infusions:   dexmedetomidine (PRECEDEX) infusion Stopped (06/27/19 0900)    dextrose 5 % and 0.45 % NaCl with KCl 20 mEq 5 mL/hr at 06/27/19 1000    HYDROmorphone PCA 0.5 mg/mL in 30mL 0.9% Sodium Chloride in a 35mL MONOject Detach Syringe      insulin (HUMAN R) infusion (adults) 0.8 Units/hr (06/26/19 1900)    ketamine (KETALAR) infusion 5 mcg/kg/min (06/27/19 1000)    nicardipine Stopped (06/24/19 0200)     Scheduled Meds:   ciprofloxacin  400 mg Intravenous Q8H    enoxaparin  40 mg Subcutaneous Daily    famotidine (PF)  20 mg Intravenous BID    fluconazole (DIFLUCAN) IVPB  400 mg Intravenous Q24H    ganciclovir (CYTOVENE) IVPB  5 mg/kg Intravenous Q12H    levalbuterol  1.25 mg Nebulization Q6H WAKE    methocarbamol (ROBAXIN) IVPB  1,000 mg Intravenous Q8H    methylPREDNISolone sodium succinate  20 mg Intravenous Daily    mycophenolate (CELLCEPT) IVPB  500 mg Intravenous BID    pantoprazole  40 mg Per OG tube Daily    polyethylene glycol  17 g Per NG tube BID    QUEtiapine  50 mg Per OG tube BID    sodium chloride 0.9%  10 mL Intravenous Q6H    tacrolimus  1.5 mg Sublingual BID    vancomycin (VANCOCIN) IVPB  750 mg Intravenous Q12H     PRN Meds:bisacodyl, Dextrose 10% Bolus, Dextrose 10% Bolus, dextrose 50%, dextrose 50%, diazePAM, diphenhydrAMINE, glucagon (human recombinant), glucose, glucose, HYDROmorphone, insulin aspart U-100,  lactulose, levalbuterol, levalbuterol, lipase-protease-amylase (VIOKACE) 20,880-78,300- 78,300 units, magnesium sulfate IVPB, metoclopramide HCl, naloxone, ondansetron, ondansetron, oxyCODONE, oxyCODONE, potassium chloride in water **AND** potassium chloride in water **AND** potassium chloride in water, promethazine (PHENERGAN) IVPB, Flushing PICC Protocol **AND** sodium chloride 0.9% **AND** sodium chloride 0.9%    Review of patient's allergies indicates:   Allergen Reactions    Albuterol Palpitations    Colistin Anaphylaxis    Vancomycin analogues      Infusion reaction that does not resolve with slowing  Pt. States she can tolerate it when given with 50 mg Benadryl and ran over 3 hours    Neupogen [filgrastim] Other (See Comments)     Ostealgia after five daily doses of 300 mcg.      Bactrim [sulfamethoxazole-trimethoprim] Hives    Ceftazidime Hives     Pt stated can tolerate cefapine not ceftazidime    Ceftazidime     Dronabinol Other (See Comments)     Mental changes/hallucinations    Haldol [haloperidol lactate] Other (See Comments)     Seizure like activity    Nsaids (non-steroidal anti-inflammatory drug)      Cannot have due to lung transplant    Adhesive Rash     Cloth tape- please use tegaderm or paper tape    Aztreonam Rash    Ciprofloxacin Nausea And Vomiting     Projectile N/V, per patient.  Unwilling to retry therapy.       Review of Systems   Constitutional: Positive for activity change and fatigue. Negative for appetite change, chills, diaphoresis and fever.   HENT: Positive for sore throat. Negative for congestion, postnasal drip, rhinorrhea, sinus pressure and sinus pain.    Respiratory: Positive for cough, chest tightness and shortness of breath. Negative for wheezing.    Cardiovascular: Positive for chest pain. Negative for palpitations and leg swelling.   Gastrointestinal: Positive for abdominal pain and nausea. Negative for abdominal distention, constipation, diarrhea and vomiting.    Genitourinary: Negative for dysuria.   Musculoskeletal: Positive for back pain.   Neurological: Positive for weakness. Negative for dizziness, syncope and headaches.   Psychiatric/Behavioral: The patient is nervous/anxious.      Objective:   Physical Exam   Constitutional: She appears well-developed and well-nourished. She is easily aroused. She appears distressed. She is sedated.   HENT:   Head: Normocephalic and atraumatic.   Mouth/Throat: Oropharynx is clear and moist. No oropharyngeal exudate.   NC in place   Eyes: Conjunctivae are normal. No scleral icterus.   Neck: No JVD present.   Right IJ CVC, CDI   Cardiovascular: Regular rhythm. Tachycardia present. Exam reveals no friction rub.   No murmur heard.  Pulmonary/Chest: No accessory muscle usage. Tachypnea noted. No respiratory distress. She has no decreased breath sounds. She has no wheezes. She has rhonchi. She has no rales.   Bilateral pleural and mediastinal chest tubes in place     Abdominal: Soft. She exhibits no distension. There is no tenderness.   Protuberant abdomen   Musculoskeletal: She exhibits no edema.   Neurological: She is alert and easily aroused. No cranial nerve deficit.   Skin: Skin is warm and dry. No rash noted. She is not diaphoretic. No erythema.   Psychiatric: Her mood appears anxious. Her affect is labile.   Nursing note and vitals reviewed.        Vital Signs (Most Recent):  Temp: 99.3 °F (37.4 °C) (06/27/19 0700)  Pulse: (!) 127 (06/27/19 1000)  Resp: (!) 36 (06/27/19 1000)  BP: (!) 144/103 (06/27/19 1000)  SpO2: 95 % (06/27/19 1000) Vital Signs (24h Range):  Temp:  [98 °F (36.7 °C)-99.3 °F (37.4 °C)] 99.3 °F (37.4 °C)  Pulse:  [] 127  Resp:  [14-53] 36  SpO2:  [91 %-100 %] 95 %  BP: (122-165)/() 144/103     Weight: 63.5 kg (139 lb 15.9 oz)  Body mass index is 26.45 kg/m².      Intake/Output Summary (Last 24 hours) at 6/27/2019 1036  Last data filed at 6/27/2019 1000  Gross per 24 hour   Intake 1915.23 ml   Output  3845 ml   Net -1929.77 ml       Ventilator Data:     Oxygen Concentration (%):  [28-40] 28    Hemodynamic Parameters:       Lines/Drains:       Introducer right internal jugular (Active)   Specific Qualities Infusing 6/27/2019  7:00 AM   Dressing Status Biopatch in place;Clean;Dry;Intact 6/27/2019  7:00 AM   Dressing Intervention Dressing reinforced 6/27/2019  3:00 AM   Dressing Change Due 06/29/19 6/27/2019  3:00 AM   Daily Line Review Performed 6/27/2019  7:00 AM   Number of days:             Port A Cath Single Lumen 06/24/14 1200 right subclavian (Active)   Accessed by: Radha Lanza 6/24/2019  5:00 AM   Dressing Type Transparent 6/27/2019  7:00 AM   Dressing Status Biopatch in place;Clean;Dry;Intact 6/27/2019  7:00 AM   Dressing Intervention Dressing reinforced 6/27/2019  3:00 AM   Dressing Change Due 06/30/19 6/27/2019  3:00 AM   Line Status Infusing 6/27/2019  7:00 AM   Flush Performed Yes 6/27/2019  7:00 AM   Date to be Reflushed 06/18/19 6/17/2019  8:00 PM   Daily Line Review Performed 6/27/2019  7:00 AM   Type of Needle Nicole 6/27/2019  3:00 AM   Gauge 20 6/26/2019  3:00 AM   Needle Length 1 in 6/26/2019  3:00 AM   Needle Status Left in place 6/27/2019  3:00 AM   Needle Insertion Date 06/24/19 6/26/2019  3:00 AM   Needle Insertion Time 0500 6/26/2019  3:00 AM   Number of days: 1828            Percutaneous Central Line Insertion/Assessment - double lumen  06/18/19 0510 (Active)   Dressing biopatch in place;dressing dry and intact 6/27/2019  7:00 AM   Securement secured w/ sutures 6/27/2019  7:00 AM   Additional Site Signs no drainage;no streak formation;no palpable cord;no pain;no edema;no warmth;no erythema 6/27/2019  7:00 AM   Distal Patency/Care infusing 6/27/2019  7:00 AM   Proximal Patency/Care infusing 6/27/2019  7:00 AM   Waveform normal 6/27/2019  3:00 AM   Line Interventions line leveled/zeroed 6/27/2019  3:00 AM   Dressing Change Due 06/29/19 6/27/2019  3:00 AM   Daily Line Review Performed  "6/27/2019  7:00 AM   Number of days: 9            Percutaneous Central Line Insertion/Assessment - triple lumen  right internal jugular (Active)   Dressing biopatch in place;dressing dry and intact 6/27/2019  7:00 AM   Securement secured w/ sutures 6/27/2019  7:00 AM   Additional Site Signs no drainage;no streak formation;no palpable cord;no pain;no edema;no warmth;no erythema 6/27/2019  7:00 AM   Distal Patency/Care infusing 6/27/2019  7:00 AM   Medial Patency/Care infusing 6/27/2019  7:00 AM   Proximal Patency/Care infusing 6/27/2019  7:00 AM   Waveform normal 6/27/2019  7:00 AM   Line Interventions line leveled/zeroed 6/27/2019  7:00 AM   Dressing Change Due 06/29/19 6/27/2019  3:00 AM   Daily Line Review Performed 6/27/2019  7:00 AM   Number of days:             Peripheral IV - Single Lumen 06/27/19 0917 22 G Right Forearm (Active)   Number of days: 0            Urethral Catheter 06/18/19 0504 Non-latex;Straight-tip;Temperature probe 14 Fr. (Active)   Site Assessment Clean;Intact 6/27/2019  7:00 AM   Collection Container Urimeter 6/27/2019  7:00 AM   Securement Method secured to top of thigh w/ adhesive device 6/27/2019  7:00 AM   Catheter Care Performed yes 6/27/2019  7:00 AM   Reason for Continuing Urinary Catheterization Critically ill in ICU requiring intensive monitoring 6/27/2019  7:00 AM   CAUTI Prevention Bundle StatLock in place w 1" slack;Intact seal between catheter & drainage tubing;Drainage bag off the floor;Green sheeting clip in use;No dependent loops or kinks;Drainage bag not overfilled (<2/3 full);Drainage bag below bladder 6/27/2019  7:00 AM   Output (mL) 150 mL 6/27/2019 10:00 AM   Number of days: 9            Y Chest Tube 1 and 2 06/18/19 1151 1 Right Mediastinal 19 Fr. 2 Right Pleural 19 Fr. (Active)   Function -20 cm H2O 6/27/2019  7:00 AM   Air Leak/Fluctuation air leak present 6/27/2019  7:00 AM   Safety all tubing connections taped;2 rubber-tipped hemostats w/ patient;all connections " secured;suction checked 6/27/2019  7:00 AM   Securement tubing secured to body distal to insertion site w/ tape 6/27/2019  7:00 AM   Left Subcutaneous Emphysema none present 6/27/2019  7:00 AM   Right Subcutaneous Emphysema none present 6/27/2019  7:00 AM   Patency Intervention Tip/tilt 6/27/2019  7:00 AM   Drainage Description 1 Serosanguineous 6/27/2019  7:00 AM   Tube 1 Dressing Appearance occlusive gauze dressing intact;clean and dry 6/27/2019  7:00 AM   Tube 1 Dressing Care dressing reinforced 6/27/2019  3:00 AM   Site Assessment 1 Not assessed 6/27/2019  7:00 AM   Surrounding Skin 1 Unable to view 6/27/2019  7:00 AM   Drainage Description 2 Serosanguineous 6/27/2019  7:00 AM   Tube 2 Dressing Appearance occlusive gauze dressing intact;clean and dry 6/27/2019  7:00 AM   Tube 2 Dressing Care dressing reinforced 6/27/2019  3:00 AM   Site Assessment 2 Not assessed 6/27/2019  7:00 AM   Surrounding Skin Unable to view 6/27/2019  7:00 AM   Output (mL) 20 mL 6/27/2019  7:00 AM   Number of days: 8            Y Chest Tube 3 and 4 06/18/19 1151 3 Left Pleural 19 Fr. 4 Left Mediastinal 19 Fr. (Active)   Function -20 cm H2O 6/27/2019  7:00 AM   Air Leak/Fluctuation air leak present 6/27/2019  7:00 AM   Safety all tubing connections taped;2 rubber-tipped hemostats w/ patient;all connections secured;suction checked 6/27/2019  7:00 AM   Securement tubing secured to body distal to insertion site w/ tape 6/27/2019  7:00 AM   Left Subcutaneous Emphysema none present 6/27/2019  7:00 AM   Right Subcutaneous Emphysema none present 6/27/2019  7:00 AM   Patency Intervention Tip/tilt 6/27/2019  7:00 AM   Drainage Description 3 Serosanguineous 6/27/2019  7:00 AM   Tube 3 Dressing Appearance occlusive gauze dressing intact;clean and dry 6/27/2019  7:00 AM   Tube 3 Dressing Care dressing reinforced 6/27/2019  3:00 AM   Site Assessment 3 Not assessed 6/27/2019  7:00 AM   Surrounding Skin 3 Unable to view 6/27/2019  7:00 AM   Drainage  Description 4 Serosanguineous 6/27/2019  7:00 AM   Tube 4 Dressing Appearance occlusive gauze dressing intact;clean and dry 6/27/2019  7:00 AM   Tube 4 Dressing Care dressing reinforced 6/27/2019  3:00 AM   Site Assessment 4 Not assessed 6/27/2019  7:00 AM   Surrounding Skin 4 Unable to view 6/27/2019  7:00 AM   Output (mL) 0 mL 6/27/2019  7:00 AM   Number of days: 8       Significant Labs:  CBC:  Recent Labs   Lab 06/27/19  0402   WBC 16.74*   RBC 3.08*   HGB 8.5*   HCT 27.5*      MCV 89   MCH 27.6   MCHC 30.9*     BMP:  Recent Labs   Lab 06/27/19  0402      K 5.1      CO2 25   BUN 18   CREATININE 0.7   CALCIUM 8.4*      Tacrolimus Levels:  Recent Labs   Lab 06/27/19 0402   TACROLIMUS 1.9*     Microbiology:  Microbiology Results (last 7 days)     Procedure Component Value Units Date/Time    Culture, Respiratory [[id number]]  (Susceptibility) Collected:  06/25/19 0900    Order Status:  Completed Specimen:  Respiratory from Bronchial Wash Updated:  06/27/19 0905     Respiratory Culture No S aureus isolated.      PSEUDOMONAS AERUGINOSA   Few       Gram Stain (Respiratory) <10 epithelial cells per low power field.     Gram Stain (Respiratory) Many WBC's     Gram Stain (Respiratory) No organisms seen    Narrative:       Bronchial Wash    AFB Culture & Smear [[id number]] Collected:  06/25/19 0900    Order Status:  Completed Specimen:  Respiratory from Bronchial Wash Updated:  06/26/19 2127     AFB Culture & Smear Culture in progress     AFB CULTURE STAIN No acid fast bacilli seen.    Narrative:       Bronchial Wash    Fungus culture [[id number]] Collected:  06/23/19 1735    Order Status:  Completed Specimen:  Respiratory from Bronchial Wash Updated:  06/26/19 1119     Fungus (Mycology) Culture YEAST   Few  Identification pending      Fungus culture [[id number]] Collected:  06/18/19 0930    Order Status:  Completed Specimen:  Tissue from Lung, Left Updated:  06/26/19 1117     Fungus (Mycology) Culture  YEAST   Moderate  Identification pending      Narrative:       Donor lung    Fungus culture [[id number]] Collected:  06/25/19 0900    Order Status:  Completed Specimen:  Respiratory from Bronchial Wash Updated:  06/26/19 1102     Fungus (Mycology) Culture Culture in progress    Narrative:       Bronchial Wash    Fungus culture [[id number]] Collected:  06/20/19 0808    Order Status:  Completed Specimen:  Respiratory from Bronchial Wash Updated:  06/26/19 1101     Fungus (Mycology) Culture Culture in progress    Culture, Respiratory with Gram Stain [[id number]]  (Susceptibility) Collected:  06/23/19 1735    Order Status:  Completed Specimen:  Respiratory from Bronchial Wash Updated:  06/26/19 0752     Respiratory Culture No S aureus isolated.      PSEUDOMONAS AERUGINOSA   Few  Normal respiratory anibal also present       Gram Stain (Respiratory) <10 epithelial cells per low power field.     Gram Stain (Respiratory) Few WBC's     Gram Stain (Respiratory) Rare Gram negative rods    AFB Culture & Smear [[id number]] Collected:  06/23/19 1735    Order Status:  Completed Specimen:  Respiratory from Bronchial Wash Updated:  06/24/19 2127     AFB Culture & Smear Culture in progress     AFB CULTURE STAIN No acid fast bacilli seen.    Culture, Anaerobe [[id number]] Collected:  06/18/19 0930    Order Status:  Completed Specimen:  Tissue from Lung, Left Updated:  06/24/19 1107     Anaerobic Culture No anaerobes isolated    Narrative:       Donor lung    Culture, Respiratory with Gram Stain [[id number]]  (Susceptibility) Collected:  06/20/19 0808    Order Status:  Completed Specimen:  Respiratory from Bronchial Wash Updated:  06/22/19 1018     Respiratory Culture No S aureus isolated.      PSEUDOMONAS AERUGINOSA   Rare       Gram Stain (Respiratory) <10 epithelial cells per low power field.     Gram Stain (Respiratory) No WBC's     Gram Stain (Respiratory) No organisms seen    AFB Culture & Smear [[id number]] Collected:  06/20/19  0808    Order Status:  Completed Specimen:  Respiratory from Bronchial Wash Updated:  06/21/19 2127     AFB Culture & Smear Culture in progress     AFB CULTURE STAIN No acid fast bacilli seen.    Fungus culture [[id number]]     Order Status:  Canceled Specimen:  Respiratory from Bronchial Wash     Culture, Respiratory with Gram Stain [[id number]]     Order Status:  Canceled Specimen:  Respiratory from Bronchial Wash     AFB Culture & Smear [[id number]]     Order Status:  Canceled Specimen:  Respiratory from Bronchial Wash     Aerobic culture [[id number]] Collected:  06/18/19 0930    Order Status:  Completed Specimen:  Tissue from Lung, Left Updated:  06/20/19 1323     Aerobic Bacterial Culture CANDIDA ALBICANS  Few  No other significant isolate      Narrative:       Donor lung          I have reviewed all pertinent labs within the past 24 hours.    Diagnostic Results:  Chest X-Ray:  No significant change        Assessment/Plan:     * S/P lung transplant  POD#6 s/p bilateral lung transplant (re-transplant) for CARLOS EDUARDO. 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 Remains PGD 0. S/p bronch and extubated to HFNC on POD#2. Re-intubated on  CTS to manage chest tubes.   - now extubated to NC and doing well. Continue Xopenex nebs Q6 hrs.  - Continue immunosuppression and ppx per protocol. Continue Daily CXR.    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 Continue Ciprofloxacin, Tobramycin x1 on 6/22.   - Repeat cultures from 6/23 with NGTD.    Acute hyperactive delirium due to another medical condition  - Likely multifactorial including post-op anesthesia, dissociative narcotics, ICU delirium  - Minimize benzodiazepine use.  PCA ordered.  Currently on Precedex and low dose Ketamine  - Restarted home Seroquel per OGT. Continue weaning gtts per Anesthesia    Acute blood loss anemia  - s/p 6u PRBCs, 2u plts, 1 cryo, and 4 FFP angie-operatively. Remains hemodynamically  -  Conservative with future transfusions. Repeat CBC if chest tube has copious bloody output    Steroid-induced hyperglycemia  - Endocrine consulted.  Currently off Insulin gtt.  Add and titrate insulin to maintain goal BG <160    Prophylactic antibiotic  - CMV D-/R+. Continue OIP with ganciclovir, fluconazole, and dapsone.   - Bronch wash 6/20 with  Pseudomonas. Continue vancomycin given history of MRSA.   - Continue ciprofloxacin 400 mg Q8hrs. Repeat bronchial wash cultures from 6/23 with NGTD.         Immunosuppression  - Previously on tacrolimus, MMF, and daily prednisone. S/p solumedrol in OR, basiliximab on POD#0 and POD#4.   - Continue MMF, tacrolimus, and steroid taper. Tacrolimus increased to 1.5 mg BID on 6/23.   - Monitor daily tacrolimus levels and adjust dose as needed.     Chronic pain with opiate use  - Anesthesia consulted for pain mgmt. Continue aggressive bowel regimen.   - Currently on Ketamine. Continue Q12 Bupivacaine in TARA catheters.   - PCA dilaudid ordered. Will add and titrate meds as needed  - VBG PRN to watch for development of hypercapnia      CF related Pancreatic insufficiency  - Viokace enzymes with trickle tube feedings. Hold VioKace when tube feeds are held.        Preventive Measures: Nutrition: Goal: oral diet, MBSS ordered, DVT: continue prophyllaxis, Head of Bet: elevate, Reposition: per unit routine, Physical Therapy: as tolerated    Counseling/Consultation:I discussed the case with Dr. Antonio Antunez MD  Lung Transplant  [facility]

## 2019-06-27 NOTE — SUBJECTIVE & OBJECTIVE
Subjective:     Interval History: Doing well from respiratory standpoint since extubation.  Emotionally labile this morning and crying frequently.  Otherwise, she is reporting only moderate pain but is substantially anxious.    Continuous Infusions:   dexmedetomidine (PRECEDEX) infusion Stopped (06/27/19 0900)    dextrose 5 % and 0.45 % NaCl with KCl 20 mEq 5 mL/hr at 06/27/19 1000    HYDROmorphone PCA 0.5 mg/mL in 30mL 0.9% Sodium Chloride in a 35mL MONOject Detach Syringe      insulin (HUMAN R) infusion (adults) 0.8 Units/hr (06/26/19 1900)    ketamine (KETALAR) infusion 5 mcg/kg/min (06/27/19 1000)    nicardipine Stopped (06/24/19 0200)     Scheduled Meds:   ciprofloxacin  400 mg Intravenous Q8H    enoxaparin  40 mg Subcutaneous Daily    famotidine (PF)  20 mg Intravenous BID    fluconazole (DIFLUCAN) IVPB  400 mg Intravenous Q24H    ganciclovir (CYTOVENE) IVPB  5 mg/kg Intravenous Q12H    levalbuterol  1.25 mg Nebulization Q6H WAKE    methocarbamol (ROBAXIN) IVPB  1,000 mg Intravenous Q8H    methylPREDNISolone sodium succinate  20 mg Intravenous Daily    mycophenolate (CELLCEPT) IVPB  500 mg Intravenous BID    pantoprazole  40 mg Per OG tube Daily    polyethylene glycol  17 g Per NG tube BID    QUEtiapine  50 mg Per OG tube BID    sodium chloride 0.9%  10 mL Intravenous Q6H    tacrolimus  1.5 mg Sublingual BID    vancomycin (VANCOCIN) IVPB  750 mg Intravenous Q12H     PRN Meds:bisacodyl, Dextrose 10% Bolus, Dextrose 10% Bolus, dextrose 50%, dextrose 50%, diazePAM, diphenhydrAMINE, glucagon (human recombinant), glucose, glucose, HYDROmorphone, insulin aspart U-100, lactulose, levalbuterol, levalbuterol, lipase-protease-amylase (VIOKACE) 20,880-78,300- 78,300 units, magnesium sulfate IVPB, metoclopramide HCl, naloxone, ondansetron, ondansetron, oxyCODONE, oxyCODONE, potassium chloride in water **AND** potassium chloride in water **AND** potassium chloride in water, promethazine (PHENERGAN)  IVPB, Flushing PICC Protocol **AND** sodium chloride 0.9% **AND** sodium chloride 0.9%    Review of patient's allergies indicates:   Allergen Reactions    Albuterol Palpitations    Colistin Anaphylaxis    Vancomycin analogues      Infusion reaction that does not resolve with slowing  Pt. States she can tolerate it when given with 50 mg Benadryl and ran over 3 hours    Neupogen [filgrastim] Other (See Comments)     Ostealgia after five daily doses of 300 mcg.      Bactrim [sulfamethoxazole-trimethoprim] Hives    Ceftazidime Hives     Pt stated can tolerate cefapine not ceftazidime    Ceftazidime     Dronabinol Other (See Comments)     Mental changes/hallucinations    Haldol [haloperidol lactate] Other (See Comments)     Seizure like activity    Nsaids (non-steroidal anti-inflammatory drug)      Cannot have due to lung transplant    Adhesive Rash     Cloth tape- please use tegaderm or paper tape    Aztreonam Rash    Ciprofloxacin Nausea And Vomiting     Projectile N/V, per patient.  Unwilling to retry therapy.       Review of Systems   Constitutional: Positive for activity change and fatigue. Negative for appetite change, chills, diaphoresis and fever.   HENT: Positive for sore throat. Negative for congestion, postnasal drip, rhinorrhea, sinus pressure and sinus pain.    Respiratory: Positive for cough, chest tightness and shortness of breath. Negative for wheezing.    Cardiovascular: Positive for chest pain. Negative for palpitations and leg swelling.   Gastrointestinal: Positive for abdominal pain and nausea. Negative for abdominal distention, constipation, diarrhea and vomiting.   Genitourinary: Negative for dysuria.   Musculoskeletal: Positive for back pain.   Neurological: Positive for weakness. Negative for dizziness, syncope and headaches.   Psychiatric/Behavioral: The patient is nervous/anxious.      Objective:   Physical Exam   Constitutional: She appears well-developed and well-nourished. She is  easily aroused. She appears distressed. She is sedated.   HENT:   Head: Normocephalic and atraumatic.   Mouth/Throat: Oropharynx is clear and moist. No oropharyngeal exudate.   NC in place   Eyes: Conjunctivae are normal. No scleral icterus.   Neck: No JVD present.   Right IJ CVC, CDI   Cardiovascular: Regular rhythm. Tachycardia present. Exam reveals no friction rub.   No murmur heard.  Pulmonary/Chest: No accessory muscle usage. Tachypnea noted. No respiratory distress. She has no decreased breath sounds. She has no wheezes. She has rhonchi. She has no rales.   Bilateral pleural and mediastinal chest tubes in place     Abdominal: Soft. She exhibits no distension. There is no tenderness.   Protuberant abdomen   Musculoskeletal: She exhibits no edema.   Neurological: She is alert and easily aroused. No cranial nerve deficit.   Skin: Skin is warm and dry. No rash noted. She is not diaphoretic. No erythema.   Psychiatric: Her mood appears anxious. Her affect is labile.   Nursing note and vitals reviewed.        Vital Signs (Most Recent):  Temp: 99.3 °F (37.4 °C) (06/27/19 0700)  Pulse: (!) 127 (06/27/19 1000)  Resp: (!) 36 (06/27/19 1000)  BP: (!) 144/103 (06/27/19 1000)  SpO2: 95 % (06/27/19 1000) Vital Signs (24h Range):  Temp:  [98 °F (36.7 °C)-99.3 °F (37.4 °C)] 99.3 °F (37.4 °C)  Pulse:  [] 127  Resp:  [14-53] 36  SpO2:  [91 %-100 %] 95 %  BP: (122-165)/() 144/103     Weight: 63.5 kg (139 lb 15.9 oz)  Body mass index is 26.45 kg/m².      Intake/Output Summary (Last 24 hours) at 6/27/2019 1036  Last data filed at 6/27/2019 1000  Gross per 24 hour   Intake 1915.23 ml   Output 3845 ml   Net -1929.77 ml       Ventilator Data:     Oxygen Concentration (%):  [28-40] 28    Hemodynamic Parameters:       Lines/Drains:       Introducer right internal jugular (Active)   Specific Qualities Infusing 6/27/2019  7:00 AM   Dressing Status Biopatch in place;Clean;Dry;Intact 6/27/2019  7:00 AM   Dressing Intervention  Dressing reinforced 6/27/2019  3:00 AM   Dressing Change Due 06/29/19 6/27/2019  3:00 AM   Daily Line Review Performed 6/27/2019  7:00 AM   Number of days:             Port A Cath Single Lumen 06/24/14 1200 right subclavian (Active)   Accessed by: Radha Lanza 6/24/2019  5:00 AM   Dressing Type Transparent 6/27/2019  7:00 AM   Dressing Status Biopatch in place;Clean;Dry;Intact 6/27/2019  7:00 AM   Dressing Intervention Dressing reinforced 6/27/2019  3:00 AM   Dressing Change Due 06/30/19 6/27/2019  3:00 AM   Line Status Infusing 6/27/2019  7:00 AM   Flush Performed Yes 6/27/2019  7:00 AM   Date to be Reflushed 06/18/19 6/17/2019  8:00 PM   Daily Line Review Performed 6/27/2019  7:00 AM   Type of Needle Nicole 6/27/2019  3:00 AM   Gauge 20 6/26/2019  3:00 AM   Needle Length 1 in 6/26/2019  3:00 AM   Needle Status Left in place 6/27/2019  3:00 AM   Needle Insertion Date 06/24/19 6/26/2019  3:00 AM   Needle Insertion Time 0500 6/26/2019  3:00 AM   Number of days: 1828            Percutaneous Central Line Insertion/Assessment - double lumen  06/18/19 0510 (Active)   Dressing biopatch in place;dressing dry and intact 6/27/2019  7:00 AM   Securement secured w/ sutures 6/27/2019  7:00 AM   Additional Site Signs no drainage;no streak formation;no palpable cord;no pain;no edema;no warmth;no erythema 6/27/2019  7:00 AM   Distal Patency/Care infusing 6/27/2019  7:00 AM   Proximal Patency/Care infusing 6/27/2019  7:00 AM   Waveform normal 6/27/2019  3:00 AM   Line Interventions line leveled/zeroed 6/27/2019  3:00 AM   Dressing Change Due 06/29/19 6/27/2019  3:00 AM   Daily Line Review Performed 6/27/2019  7:00 AM   Number of days: 9            Percutaneous Central Line Insertion/Assessment - triple lumen  right internal jugular (Active)   Dressing biopatch in place;dressing dry and intact 6/27/2019  7:00 AM   Securement secured w/ sutures 6/27/2019  7:00 AM   Additional Site Signs no drainage;no streak formation;no palpable  "cord;no pain;no edema;no warmth;no erythema 6/27/2019  7:00 AM   Distal Patency/Care infusing 6/27/2019  7:00 AM   Medial Patency/Care infusing 6/27/2019  7:00 AM   Proximal Patency/Care infusing 6/27/2019  7:00 AM   Waveform normal 6/27/2019  7:00 AM   Line Interventions line leveled/zeroed 6/27/2019  7:00 AM   Dressing Change Due 06/29/19 6/27/2019  3:00 AM   Daily Line Review Performed 6/27/2019  7:00 AM   Number of days:             Peripheral IV - Single Lumen 06/27/19 0917 22 G Right Forearm (Active)   Number of days: 0            Urethral Catheter 06/18/19 0504 Non-latex;Straight-tip;Temperature probe 14 Fr. (Active)   Site Assessment Clean;Intact 6/27/2019  7:00 AM   Collection Container Urimeter 6/27/2019  7:00 AM   Securement Method secured to top of thigh w/ adhesive device 6/27/2019  7:00 AM   Catheter Care Performed yes 6/27/2019  7:00 AM   Reason for Continuing Urinary Catheterization Critically ill in ICU requiring intensive monitoring 6/27/2019  7:00 AM   CAUTI Prevention Bundle StatLock in place w 1" slack;Intact seal between catheter & drainage tubing;Drainage bag off the floor;Green sheeting clip in use;No dependent loops or kinks;Drainage bag not overfilled (<2/3 full);Drainage bag below bladder 6/27/2019  7:00 AM   Output (mL) 150 mL 6/27/2019 10:00 AM   Number of days: 9            Y Chest Tube 1 and 2 06/18/19 1151 1 Right Mediastinal 19 Fr. 2 Right Pleural 19 Fr. (Active)   Function -20 cm H2O 6/27/2019  7:00 AM   Air Leak/Fluctuation air leak present 6/27/2019  7:00 AM   Safety all tubing connections taped;2 rubber-tipped hemostats w/ patient;all connections secured;suction checked 6/27/2019  7:00 AM   Securement tubing secured to body distal to insertion site w/ tape 6/27/2019  7:00 AM   Left Subcutaneous Emphysema none present 6/27/2019  7:00 AM   Right Subcutaneous Emphysema none present 6/27/2019  7:00 AM   Patency Intervention Tip/tilt 6/27/2019  7:00 AM   Drainage Description 1 " Serosanguineous 6/27/2019  7:00 AM   Tube 1 Dressing Appearance occlusive gauze dressing intact;clean and dry 6/27/2019  7:00 AM   Tube 1 Dressing Care dressing reinforced 6/27/2019  3:00 AM   Site Assessment 1 Not assessed 6/27/2019  7:00 AM   Surrounding Skin 1 Unable to view 6/27/2019  7:00 AM   Drainage Description 2 Serosanguineous 6/27/2019  7:00 AM   Tube 2 Dressing Appearance occlusive gauze dressing intact;clean and dry 6/27/2019  7:00 AM   Tube 2 Dressing Care dressing reinforced 6/27/2019  3:00 AM   Site Assessment 2 Not assessed 6/27/2019  7:00 AM   Surrounding Skin Unable to view 6/27/2019  7:00 AM   Output (mL) 20 mL 6/27/2019  7:00 AM   Number of days: 8            Y Chest Tube 3 and 4 06/18/19 1151 3 Left Pleural 19 Fr. 4 Left Mediastinal 19 Fr. (Active)   Function -20 cm H2O 6/27/2019  7:00 AM   Air Leak/Fluctuation air leak present 6/27/2019  7:00 AM   Safety all tubing connections taped;2 rubber-tipped hemostats w/ patient;all connections secured;suction checked 6/27/2019  7:00 AM   Securement tubing secured to body distal to insertion site w/ tape 6/27/2019  7:00 AM   Left Subcutaneous Emphysema none present 6/27/2019  7:00 AM   Right Subcutaneous Emphysema none present 6/27/2019  7:00 AM   Patency Intervention Tip/tilt 6/27/2019  7:00 AM   Drainage Description 3 Serosanguineous 6/27/2019  7:00 AM   Tube 3 Dressing Appearance occlusive gauze dressing intact;clean and dry 6/27/2019  7:00 AM   Tube 3 Dressing Care dressing reinforced 6/27/2019  3:00 AM   Site Assessment 3 Not assessed 6/27/2019  7:00 AM   Surrounding Skin 3 Unable to view 6/27/2019  7:00 AM   Drainage Description 4 Serosanguineous 6/27/2019  7:00 AM   Tube 4 Dressing Appearance occlusive gauze dressing intact;clean and dry 6/27/2019  7:00 AM   Tube 4 Dressing Care dressing reinforced 6/27/2019  3:00 AM   Site Assessment 4 Not assessed 6/27/2019  7:00 AM   Surrounding Skin 4 Unable to view 6/27/2019  7:00 AM   Output (mL) 0 mL  6/27/2019  7:00 AM   Number of days: 8       Significant Labs:  CBC:  Recent Labs   Lab 06/27/19  0402   WBC 16.74*   RBC 3.08*   HGB 8.5*   HCT 27.5*      MCV 89   MCH 27.6   MCHC 30.9*     BMP:  Recent Labs   Lab 06/27/19  0402      K 5.1      CO2 25   BUN 18   CREATININE 0.7   CALCIUM 8.4*      Tacrolimus Levels:  Recent Labs   Lab 06/27/19  0402   TACROLIMUS 1.9*     Microbiology:  Microbiology Results (last 7 days)     Procedure Component Value Units Date/Time    Culture, Respiratory [[id number]]  (Susceptibility) Collected:  06/25/19 0900    Order Status:  Completed Specimen:  Respiratory from Bronchial Wash Updated:  06/27/19 0905     Respiratory Culture No S aureus isolated.      PSEUDOMONAS AERUGINOSA   Few       Gram Stain (Respiratory) <10 epithelial cells per low power field.     Gram Stain (Respiratory) Many WBC's     Gram Stain (Respiratory) No organisms seen    Narrative:       Bronchial Wash    AFB Culture & Smear [[id number]] Collected:  06/25/19 0900    Order Status:  Completed Specimen:  Respiratory from Bronchial Wash Updated:  06/26/19 2127     AFB Culture & Smear Culture in progress     AFB CULTURE STAIN No acid fast bacilli seen.    Narrative:       Bronchial Wash    Fungus culture [[id number]] Collected:  06/23/19 1735    Order Status:  Completed Specimen:  Respiratory from Bronchial Wash Updated:  06/26/19 1119     Fungus (Mycology) Culture YEAST   Few  Identification pending      Fungus culture [[id number]] Collected:  06/18/19 0930    Order Status:  Completed Specimen:  Tissue from Lung, Left Updated:  06/26/19 1117     Fungus (Mycology) Culture YEAST   Moderate  Identification pending      Narrative:       Donor lung    Fungus culture [[id number]] Collected:  06/25/19 0900    Order Status:  Completed Specimen:  Respiratory from Bronchial Wash Updated:  06/26/19 1102     Fungus (Mycology) Culture Culture in progress    Narrative:       Bronchial Wash    Fungus culture  [[id number]] Collected:  06/20/19 0808    Order Status:  Completed Specimen:  Respiratory from Bronchial Wash Updated:  06/26/19 1101     Fungus (Mycology) Culture Culture in progress    Culture, Respiratory with Gram Stain [[id number]]  (Susceptibility) Collected:  06/23/19 1735    Order Status:  Completed Specimen:  Respiratory from Bronchial Wash Updated:  06/26/19 0752     Respiratory Culture No S aureus isolated.      PSEUDOMONAS AERUGINOSA   Few  Normal respiratory anibal also present       Gram Stain (Respiratory) <10 epithelial cells per low power field.     Gram Stain (Respiratory) Few WBC's     Gram Stain (Respiratory) Rare Gram negative rods    AFB Culture & Smear [[id number]] Collected:  06/23/19 1735    Order Status:  Completed Specimen:  Respiratory from Bronchial Wash Updated:  06/24/19 2127     AFB Culture & Smear Culture in progress     AFB CULTURE STAIN No acid fast bacilli seen.    Culture, Anaerobe [[id number]] Collected:  06/18/19 0930    Order Status:  Completed Specimen:  Tissue from Lung, Left Updated:  06/24/19 1107     Anaerobic Culture No anaerobes isolated    Narrative:       Donor lung    Culture, Respiratory with Gram Stain [[id number]]  (Susceptibility) Collected:  06/20/19 0808    Order Status:  Completed Specimen:  Respiratory from Bronchial Wash Updated:  06/22/19 1018     Respiratory Culture No S aureus isolated.      PSEUDOMONAS AERUGINOSA   Rare       Gram Stain (Respiratory) <10 epithelial cells per low power field.     Gram Stain (Respiratory) No WBC's     Gram Stain (Respiratory) No organisms seen    AFB Culture & Smear [[id number]] Collected:  06/20/19 0808    Order Status:  Completed Specimen:  Respiratory from Bronchial Wash Updated:  06/21/19 2127     AFB Culture & Smear Culture in progress     AFB CULTURE STAIN No acid fast bacilli seen.    Fungus culture [[id number]]     Order Status:  Canceled Specimen:  Respiratory from Bronchial Wash     Culture, Respiratory with Gram  Stain [[id number]]     Order Status:  Canceled Specimen:  Respiratory from Bronchial Wash     AFB Culture & Smear [[id number]]     Order Status:  Canceled Specimen:  Respiratory from Bronchial Wash     Aerobic culture [[id number]] Collected:  06/18/19 0930    Order Status:  Completed Specimen:  Tissue from Lung, Left Updated:  06/20/19 1323     Aerobic Bacterial Culture CANDIDA ALBICANS  Few  No other significant isolate      Narrative:       Donor lung          I have reviewed all pertinent labs within the past 24 hours.    Diagnostic Results:  Chest X-Ray:  No significant change

## 2019-06-27 NOTE — ASSESSMENT & PLAN NOTE
BG goal 140 - 180.     Transition IV insulin infusion at 0.8 u/hr  Low dose correction scale  BG monitoring every 4 hours while NPO    Discharge planning: [facility]

## 2019-06-27 NOTE — ASSESSMENT & PLAN NOTE
POD#6 s/p bilateral lung transplant (re-transplant) for CARLOS EDUARDO. 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 Remains PGD 0. S/p bronch and extubated to HFNC on POD#2. Re-intubated on  CTS to manage chest tubes.   - now extubated to NC and doing well. Continue Xopenex nebs Q6 hrs.  - Continue immunosuppression and ppx per protocol. Continue Daily CXR.

## 2019-06-27 NOTE — ASSESSMENT & PLAN NOTE
- Endocrine consulted.  Currently off Insulin gtt.  Add and titrate insulin to maintain goal BG <160

## 2019-06-27 NOTE — SUBJECTIVE & OBJECTIVE
"Interval HPI:   Overnight events: Remains in ICU, NAEON. BG well controlled overnight on transition IV insulin infusion at 0.8 u/hr with correction scale.  Solu medrol 20 mg IV daily; steroid taper per primary.  On IV antibiotics.  Eating:   NPO  Nausea: No  Hypoglycemia and intervention: No  Fever: No  TPN and/or TF: No    BP (!) 144/103 (BP Location: Left arm, Patient Position: Lying)   Pulse (!) 127   Temp 99.3 °F (37.4 °C) (Oral)   Resp (!) 36   Ht 5' 1" (1.549 m)   Wt 63.5 kg (139 lb 15.9 oz)   LMP 10/02/2016   SpO2 95%   Breastfeeding? No   BMI 26.45 kg/m²     Labs Reviewed and Include    Recent Labs   Lab 06/27/19  0402   *   CALCIUM 8.4*   ALBUMIN 2.6*   PROT 6.3      K 5.1   CO2 25      BUN 18   CREATININE 0.7   ALKPHOS 189*   ALT 64*   AST 44*   BILITOT 0.5     Lab Results   Component Value Date    WBC 16.74 (H) 06/27/2019    HGB 8.5 (L) 06/27/2019    HCT 27.5 (L) 06/27/2019    MCV 89 06/27/2019     06/27/2019     No results for input(s): TSH, FREET4 in the last 168 hours.  Lab Results   Component Value Date    HGBA1C 4.7 01/09/2019       Nutritional status:   Body mass index is 26.45 kg/m².  Lab Results   Component Value Date    ALBUMIN 2.6 (L) 06/27/2019    ALBUMIN 2.4 (L) 06/26/2019    ALBUMIN 2.3 (L) 06/25/2019     Lab Results   Component Value Date    PREALBUMIN 15 (L) 05/29/2014    PREALBUMIN 11 (L) 05/09/2014    PREALBUMIN 18 (L) 03/19/2014       Estimated Creatinine Clearance: 100.4 mL/min (based on SCr of 0.7 mg/dL).    Accu-Checks  Recent Labs     06/25/19  1728 06/25/19  2023 06/26/19  0004 06/26/19  0330 06/26/19  0821 06/26/19  1210 06/26/19  1624 06/26/19  1938 06/26/19  2315 06/27/19  0404   POCTGLUCOSE 175* 137* 151* 151* 182* 116* 150* 115* 229* 168*       Current Medications and/or Treatments Impacting Glycemic Control  Immunotherapy:    Immunosuppressants         Stop Route Frequency     tacrolimus capsule 1.5 mg      -- SL 2 times daily     " mycophenolate (CELLCEPT) 500 mg in dextrose 5 % 100 mL IVPB      -- IV 2 times daily        Steroids:   Hormones (From admission, onward)    Start     Stop Route Frequency Ordered    06/27/19 0945  methylPREDNISolone sodium succinate injection 20 mg      -- IV Daily 06/27/19 0831    06/26/19 1827  dexAMETHasone sodium phos (PF) 10 mg/mL injection     Note to Pharmacy:  Created by cabinet override    06/27 0629 06/26/19 1827        Pressors:    Autonomic Drugs (From admission, onward)    None        Hyperglycemia/Diabetes Medications:   Antihyperglycemics (From admission, onward)    Start     Stop Route Frequency Ordered    06/20/19 1545  insulin regular (Humulin R) 100 Units in sodium chloride 0.9% 100 mL infusion      -- IV Continuous 06/20/19 1442    06/20/19 1542  insulin aspart U-100 pen 0-4 Units      -- SubQ As needed (PRN) 06/20/19 1442

## 2019-06-27 NOTE — PLAN OF CARE
Problem: SLP Goal  Goal: SLP Goal  Speech Language Pathology Goals  Goals expected to be met by 7/4:  1. Patient will participate in ongoing swallow assessment.   2. When determined by SLP, patient may benefit from a repeat modified barium swallow study to further objectively assess swallow function/safety.       MBSS completed. Patient with increased work of breathing, reporting pain, and with increased anxiety. Per RN, the transfer to radiology likely influenced patient's respiratory rate/anxiety.  Limited trials were able to be assessed given the above stated patient presentation.   ST with recommendations for continued NPO at this time. See full report for details.     Emily P. Abadie M.S., CCC-SLP  Speech Language Pathologist  [phone]  06/27/2019

## 2019-06-27 NOTE — CONSULTS
NIAS at bedside for PICC procedure.  Pt does not have adequate vein size on both LUE and RUE using ultrasound imaging. We will attempt to notify and inform Med Team.

## 2019-06-27 NOTE — PROGRESS NOTES
"[facility]  Endocrinology  Progress Note    Admit Date: 6/17/2019     Reason for Consult: Management of  Hyperglycemia and CF related pancreatic insufficiency.     Surgical Procedure and Date: lung transplant in 2014; 6/18/19    HPI:   Patient is a 29 y.o. female with a diagnosis of CF, HTN, migraine headache, and osteopenia. 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 No personal history of DM. Endocrinology consulted for BG management.     Lab Results   Component Value Date    HGBA1C 4.7 01/09/2019                 Interval HPI:   Overnight events: Remains in ICU, NAEON. BG well controlled overnight on transition IV insulin infusion at 0.8 u/hr with correction scale.  Solu medrol 20 mg IV daily; steroid taper per primary.  On IV antibiotics.  Eating:   NPO  Nausea: No  Hypoglycemia and intervention: No  Fever: No  TPN and/or TF: No    BP (!) 144/103 (BP Location: Left arm, Patient Position: Lying)   Pulse (!) 127   Temp 99.3 °F (37.4 °C) (Oral)   Resp (!) 36   Ht 5' 1" (1.549 m)   Wt 63.5 kg (139 lb 15.9 oz)   LMP 10/02/2016   SpO2 95%   Breastfeeding? No   BMI 26.45 kg/m²      Labs Reviewed and Include    Recent Labs   Lab 06/27/19  0402   *   CALCIUM 8.4*   ALBUMIN 2.6*   PROT 6.3      K 5.1   CO2 25      BUN 18   CREATININE 0.7   ALKPHOS 189*   ALT 64*   AST 44*   BILITOT 0.5     Lab Results   Component Value Date    WBC 16.74 (H) 06/27/2019    HGB 8.5 (L) 06/27/2019    HCT 27.5 (L) 06/27/2019    MCV 89 06/27/2019     06/27/2019     No results for input(s): TSH, FREET4 in the last 168 hours.  Lab Results   Component Value Date    HGBA1C 4.7 01/09/2019       Nutritional status:   Body mass index is 26.45 kg/m².  Lab Results   Component Value Date    ALBUMIN 2.6 (L) 06/27/2019    ALBUMIN 2.4 (L) 06/26/2019    ALBUMIN 2.3 (L) 06/25/2019     Lab Results   Component Value Date    PREALBUMIN 15 (L) 05/29/2014    " PREALBUMIN 11 (L) 05/09/2014    PREALBUMIN 18 (L) 03/19/2014       Estimated Creatinine Clearance: 100.4 mL/min (based on SCr of 0.7 mg/dL).    Accu-Checks  Recent Labs     06/25/19  1728 06/25/19  2023 06/26/19  0004 06/26/19  0330 06/26/19  0821 06/26/19  1210 06/26/19  1624 06/26/19  1938 06/26/19  2315 06/27/19  0404   POCTGLUCOSE 175* 137* 151* 151* 182* 116* 150* 115* 229* 168*       Current Medications and/or Treatments Impacting Glycemic Control  Immunotherapy:    Immunosuppressants         Stop Route Frequency     tacrolimus capsule 1.5 mg      -- SL 2 times daily     mycophenolate (CELLCEPT) 500 mg in dextrose 5 % 100 mL IVPB      -- IV 2 times daily        Steroids:   Hormones (From admission, onward)    Start     Stop Route Frequency Ordered    06/27/19 0945  methylPREDNISolone sodium succinate injection 20 mg      -- IV Daily 06/27/19 0831    06/26/19 1827  dexAMETHasone sodium phos (PF) 10 mg/mL injection     Note to Pharmacy:  Created by cabinet override    06/27 0629   06/26/19 1827        Pressors:    Autonomic Drugs (From admission, onward)    None        Hyperglycemia/Diabetes Medications:   Antihyperglycemics (From admission, onward)    Start     Stop Route Frequency Ordered    06/20/19 1545  insulin regular (Humulin R) 100 Units in sodium chloride 0.9% 100 mL infusion      -- IV Continuous 06/20/19 1442    06/20/19 1542  insulin aspart U-100 pen 0-4 Units      -- SubQ As needed (PRN) 06/20/19 1442          ASSESSMENT and PLAN    * S/P lung transplant  Managed per LUT.   avoid hypoglycemia        Hyperglycemia  BG goal 140 - 180.     Transition IV insulin infusion at 0.8 u/hr  Low dose correction scale  BG monitoring every 4 hours while NPO    Discharge planning: TBD    CF related Pancreatic insufficiency  May impact BG.       Adrenal cortical steroids causing adverse effect in therapeutic use  Glucocorticoids markedly increase  glucoses. Expect the steroid taper will help glucose control.          Prophylactic immunotherapy  May increase insulin resistance.       Deny Swenson NP  Endocrinology  [facility]

## 2019-06-27 NOTE — ANESTHESIA POST-OP PAIN MANAGEMENT
Acute Pain Service Progress Note    Juanita Ibarra is a 29 y.o., female, [id number].    Surgery:  REDO BILATERAL LUNG TRANSPLANT    Post Op Day #: 9    Catheter type: perineural  Bilateral TARA placed 6/19/2019    Hand bolus: q12H 30mL of 0.25% Bupivacaine with 1:300k epi, 1mg preservative free dexamethasone, and 50mcg clonidine per side      Problem List:    There are no hospital problems to display for this patient.      Subjective:    Patient extubated 6/26/19. Overnight, anesthesia called to bedside for pain/anxiety. Received 1g IV tylenol, robaxin, diazepam.    Objective:     Bilateral catheter sites clean/dry/intact.      Vitals   There were no vitals filed for this visit.     Labs    Admission on 06/17/2019   No results displayed because visit has over 200 results.           Meds   No current facility-administered medications for this visit.      No current outpatient medications on file.     Facility-Administered Medications Ordered in Other Visits   Medication Dose Route Frequency Provider Last Rate Last Dose    bisacodyl suppository 10 mg  10 mg Rectal Daily PRN Shama Canales PA-C   10 mg at 06/23/19 1000    ciprofloxacin (CIPRO)400mg/200ml D5W IVPB 400 mg  400 mg Intravenous Q8H Jacqueline Dumont PA-C 200 mL/hr at 06/27/19 1329 400 mg at 06/27/19 1329    dexmedetomidine (PRECEDEX) 400mcg/100mL 0.9% NaCL infusion  0.2 mcg/kg/hr Intravenous Continuous Shama Canales PA-C 12.8 mL/hr at 06/27/19 1300 0.8 mcg/kg/hr at 06/27/19 1300    dextrose 10% (D10W) Bolus  12.5 g Intravenous PRN Jonathan Newby MD 1,000 mL/hr at 06/19/19 0232 125 mL at 06/19/19 0232    dextrose 10% (D10W) Bolus  25 g Intravenous PRN Jonathan Newby MD        dextrose 5 % and 0.45 % NaCl with KCl 20 mEq infusion   Intravenous Continuous Jacqueline Douglass MD 5 mL/hr at 06/27/19 1300      dextrose 50% injection 12.5 g  12.5 g Intravenous PRN Amy Zapata NP        dextrose 50% injection 25 g  25 g  Intravenous PRN Amy Zapata NP        diphenhydrAMINE injection 50 mg  50 mg Intravenous On Call Procedure Francisca LAditi Morin,    50 mg at 06/27/19 0338    enoxaparin injection 40 mg  40 mg Subcutaneous Daily Jacqueline Dumont PA-C   40 mg at 06/26/19 1622    famotidine (PF) injection 20 mg  20 mg Intravenous BID Jacqueline Dumont PA-C   20 mg at 06/27/19 0818    fluconazole (DIFLUCAN) IVPB 400 mg 200 mL  400 mg Intravenous Q24H Shama Canales PA-C 100 mL/hr at 06/27/19 0805 400 mg at 06/27/19 0805    ganciclovir (CYTOVENE) 295 mg in sodium chloride 0.9% 100 mL IVPB  5 mg/kg Intravenous Q12H Jacqueline Douglass  mL/hr at 06/27/19 1000 295 mg at 06/27/19 1000    glucagon (human recombinant) injection 1 mg  1 mg Intramuscular PRN Amy Zapata NP        glucose chewable tablet 16 g  16 g Oral PRN Amy Zapata NP        glucose chewable tablet 24 g  24 g Oral PRN Amy Zapata NP        hydromorphone (PF) injection 2 mg  2 mg Intravenous Q1H PRN Jhoan Antunez MD   2 mg at 06/26/19 1201    HYDROmorphone PCA 0.5 mg/mL in 30mL 0.9% Sodium Chloride in a 35mL MONOject Detach Syringe   Intravenous Continuous Twan Alvarez MD        insulin aspart U-100 pen 0-4 Units  0-4 Units Subcutaneous PRN Amy Zapata NP   2 Units at 06/27/19 1226    insulin regular (Humulin R) 100 Units in sodium chloride 0.9% 100 mL infusion  0.8 Units/hr Intravenous Continuous Deny Swenson NP 0.8 mL/hr at 06/26/19 1900 0.8 Units/hr at 06/26/19 1900    ketamine (KETALAR) 5,000 mg in sodium chloride 0.9% 500 mL infusion  2.5 mcg/kg/min Intravenous Continuous Kenny Merino MD 1 mL/hr at 06/27/19 1300 2.5 mcg/kg/min at 06/27/19 1300    lactulose 20 gram/30 mL solution Soln 20 g  20 g Oral Q6H PRN Angelica Gomez MD        levalbuterol nebulizer solution 1.25 mg  1.25 mg Nebulization Q4H PRN Jacqueline Dumont PA-C   1.25 mg at 06/20/19 2023    levalbuterol nebulizer  solution 1.25 mg  1.25 mg Nebulization Q6H WAKE Jacqueline Dumont PA-C   1.25 mg at 06/27/19 0717    lidocaine 5 % patch 1 patch  1 patch Transdermal Q24H Farhan H MD Karen   1 patch at 06/27/19 1214    lidocaine 5 % patch 1 patch  1 patch Transdermal Q24H Farhan H MD Karen   1 patch at 06/27/19 1214    lipase-protease-amylase (VIOKACE) 20,880-78,300- 78,300 units per tablet 1 tablet  1 tablet Oral Q3H PRN Francisca Morin DO   1 tablet at 06/24/19 1700    lorazepam (ATIVAN) injection 2 mg  2 mg Intravenous Q4H PRN Jacqueline Dumont PA-C   2 mg at 06/27/19 1214    magnesium sulfate 2g in water 50mL IVPB (premix)  2 g Intravenous PRN Angelica Gomez MD   2 g at 06/27/19 0600    methocarbamol 1000 mg in dextrose 5 % 100 mL IVPB (ready to mix system)  1,000 mg Intravenous Q8H Twan Alvarez MD   1,000 mg at 06/27/19 1329    methylPREDNISolone sodium succinate injection 20 mg  20 mg Intravenous Daily Jake Alvarez MD   20 mg at 06/27/19 0853    metoclopramide HCl injection 5 mg  5 mg Intravenous Q6H PRN Jacqueline Douglass MD        mycophenolate (CELLCEPT) 500 mg in dextrose 5 % 100 mL IVPB  500 mg Intravenous BID Shama Canales PA-C 50 mL/hr at 06/27/19 0803 500 mg at 06/27/19 0803    naloxone 0.4 mg/mL injection 0.02 mg  0.02 mg Intravenous PRN Riley Pennington MD        niCARdipine 40 mg/200 mL infusion  1 mg/hr Intravenous Continuous Harleen Riley MD   Stopped at 06/24/19 0200    ondansetron disintegrating tablet 8 mg  8 mg Oral Q8H PRN Jacqueline Douglass MD        ondansetron injection 4 mg  4 mg Intravenous Q6H PRN Shama Canales PA-C   4 mg at 06/26/19 2008    oxyCODONE immediate release tablet 10 mg  10 mg Oral Q4H PRN Jacqueline Douglass MD        oxyCODONE immediate release tablet 5 mg  5 mg Oral Q4H PRN Jacqueline Douglass MD        polyethylene glycol packet 17 g  17 g Per NG tube BID Jacqueline Dumont PA-C   17 g at 06/25/19 2104     potassium chloride 20 mEq in 100 mL IVPB (FOR CENTRAL LINE ADMINISTRATION ONLY)  20 mEq Intravenous PRN Jacqueline Douglass MD 50 mL/hr at 06/22/19 0647 20 mEq at 06/22/19 0647    And    potassium chloride 40 mEq in 100 mL IVPB (FOR CENTRAL LINE ADMINISTRATION ONLY)  40 mEq Intravenous PRN Jacqueline Douglass MD 25 mL/hr at 06/18/19 1749 40 mEq at 06/18/19 1749    And    potassium chloride 20 mEq in 100 mL IVPB (FOR CENTRAL LINE ADMINISTRATION ONLY)  60 mEq Intravenous PRN Jacqueline Douglass MD        promethazine (PHENERGAN) 6.25 mg in dextrose 5 % 50 mL IVPB  6.25 mg Intravenous Q6H PRN Chai Glass MD        sodium chloride 0.9% flush 10 mL  10 mL Intravenous Q6H Jake Alvarez MD   10 mL at 06/27/19 1200    And    sodium chloride 0.9% flush 10 mL  10 mL Intravenous PRN Jake Alvarez MD        tacrolimus capsule 2 mg  2 mg Sublingual BID Jake Alvarez MD        vancomycin 750 mg in dextrose 5 % 250 mL IVPB (ready to mix system)  750 mg Intravenous Q12H Francisca Morin .7 mL/hr at 06/27/19 0359 750 mg at 06/27/19 0359        Anticoagulant dose: none.    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 Patient with significant opioid requirement prior to surgery from previous lung transplant. Remained intubated s/p lung transplant, requiring fentanyl with dexmedetomidine at 1.4mcg/kg/hr and propofol 50mcg/kg/min for sedation. S/P bilateral TARA catheter placement 06/19/19 w/ hand boluses Q12H at 0700 and 1900. Patient placed on ketamine infusion on 6/20 at 10 mcg/kg/min (titrated up to 25 mcg/kg/min), extubated on 6/21 but re-intubated for respiratory distress, agitation, delirium. Ketamine discontinued. Restarted low dose non-titrating 5 mcg/kg/min on 6/24/19. Extubated 6/26/19 to [facility].    - Bilateral TARA catheters pulled on 6/27/19  [facility]; once able to swallow, will add multimodals.  - Ketamine reduced to 2.5 mcg/kg/min  - Dilaudid PCA, 0.5mg basal, 0.3mg q6min with 3.5mg/hr max  - Defer treatment of anxiety to primary team         Evaluator Farhan Jones

## 2019-06-27 NOTE — PROGRESS NOTES
SW met with pt and spouse during the day on 6/26 in order to offer support or address any needs. Pt was getting ready to be extubated and communicated via white board. Pt admitted to having anxiety however was able to stay calm while on CPAP vent setting. Pt was extubated. SW offered emotional support to both pt and spouse through discussion and validation of feelings. No needs voiced at this time. SW will continue to provide psychosocial support, education, resources and assistance with needs as appropriate

## 2019-06-27 NOTE — PROCEDURES
"Modified Barium Swallow    Patient Name:  Juanita Ibarra   MRN:  [id number]      Recommendations:     Recommendations:                General Recommendations:  Dysphagia therapy  Diet recommendations:  NPO, NPO   Aspiration Precautions: Strict aspiration precautions   General Precautions: Standard, fall  Communication strategies:  none    Referral     Reason for Referral  Patient was referred for a Modified Barium Swallow Study to assess the efficiency of his/her swallow function, rule out aspiration and make recommendations regarding safe dietary consistencies, effective compensatory strategies, and safe eating environment.     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 Most recent studies inclued:    9/2/2016:   "This 26 y.o. woman appears to present with  1. Improved pharyngeal phase dysphagia for liquids s/p TVF medialization.  Now judged as mild and characterized by mildly reduced pharyngeal strength and degree of contraction resulting in inconsistent supraglottic penetration and mild residue in the valleculae and pyriforms.  Solids were safely swallowed without strategy. Recommend regular / thin.   2.  History aspiration pneumonias.  3.  Markedly improved voice quality s/p TVF medialiation 8/2/16.  4.  History L TVF paralysis after lung transplant June 2014 and later TVF injection.    11/08/2017: "The results of this MBSS indicate that patient demonstrates oropharyngeal swallow function " "WNL.   -Diet: recommend regular as tolerated; recommended consideration of thickening liquids if pt typically coughs with thin liquids, but she is unwilling to use this strategy long-term.   -Therapeutic technique: recommend effortful swallow and multiple swallows per bolus "         Objective:     Upon ST entry to radiology suite, patient with notable increased work of breathing, course breath sounds, fatigue, anxiety, and reports of pain.    Patient able to achieve fleeting functional phonation though at times patient was too weak to verbally communicate therefore utilized pen/paper throughout assessment. Multiple cues for patient to keep head upright in neutral positioning were needed given fatigue.  RN reported that patient does present with fluctuating levels of anxiety and fatigue, though reports the transfer from room to radiology suite to be a source of increased anxiety of patient. Limited number of trials were able to be assessed given patient overall clinical picture at time of study.     Current Respiratory Status: 06/27/19    Alert: yes   Cooperative: yes  Follows Directions: yes    Visualization  · Patient was seen in the lateral view    Oral Peripheral Examination  Oral Musculature: general weakness  Dentition: present and adequate  Mucosal Quality: dry  Volitional Cough: Productive  Voice Prior to PO Intake: Fleeting functional phonation achieved during study, intermittent aphonia.    Consistencies Assessed  · Nectar thick via 1/2 tsp x1  · Puree via full tsp x1    Oral Preparation/Oral Phase  · WFL- Pt with adequate bolus acceptance, containment, control and timely A-P transfer across consistencies     Pharyngeal Phase     · Essentially timely swallow initiation for age  · Reduced laryngeal elevation, excursion, epiglottic inversion,   · Reduced BOT retraction  · Reduced pharyngeal wall contractions  · Consistent laryngeal penetration with nectar thickened liquid trial, and during independently " completed multiple swallows in patient's attempt to clear residue.   · Cued coughing at this time beneficial in clearing penetrated material with eventual oral expectoration into Yankauer suction. Patient with strong, productive cough.     *At this time patient's presentation noted to decline with increased fatigue and work of breathing not able to be reduced by calming efforts of ST/RN. Given max motivation/encouragement, patient agreeable to further trial. *    · Puree via single full tablespoon was tolerated well with no laryngeal penetration or aspiration evident. (no further trials assessed)  There was presence of mild valleculae residue/stasis with both nectar and    Cervical Esophageal Phase  · Puree residue noted to remain in upper esophageus.     Assessment:     Impressions  ·  At this time it is difficult to assess patient's true swallow function, safety, and degree of impairment given limited trials were able to be assess under fluoro as well as clinical picture during time of study limiting full radiographic assessment.  Patient presents with course breath sounds prior to any PO administered, increased work of breathing, visible discomfort, anxiety, and overall fatigue.  Although patient with no penetration or aspiration with puree via full tablespoon x1, patient concerning for tolerance of puree and/or meds crushed in puree 100% of the time.  ST recommending continued NPO at this time.     Prognosis: Fair    Barriers:  · Fatigue  · Respiratory compromise    Plan  ST to follow up at the bedside for ongoing swallow assessment/education.    If overall clinical improvement is noted, patient may benefit from a repeat MBSS to further assess swallow function/safety when determined appropriate by the SLP.    Education  Results were discussed with patient.   Team paged; ST awaiting call back.     Goals:   Multidisciplinary Problems     SLP Goals        Problem: SLP Goal    Goal Priority Disciplines Outcome   SLP  Goal     SLP    Description:  Speech Language Pathology Goals  Goals expected to be met by 7/4:  1. Patient will participate in ongoing swallow assessment.   2. When determined by SLP, patient may benefit from a repeat modified barium swallow study to further objectively assess swallow function/safety.                         Plan:   · Patient to be seen:  Therapy Frequency: 4 x/week   · Plan of Care expires:  07/27/19  · Plan of Care reviewed with:  patient        Discharge recommendations:  other (see comments)   Barriers to Discharge:  None    Time Tracking:   SLP Treatment Date:   06/27/19  Speech Start Time:  1455  Speech Stop Time:  1510     Speech Total Time (min):  15 min    Emily Abadie, CCC-SLP  06/27/2019

## 2019-06-27 NOTE — ASSESSMENT & PLAN NOTE
- Anesthesia consulted for pain mgmt. Continue aggressive bowel regimen.   - Currently on Ketamine. Continue Q12 Bupivacaine in TARA catheters.   - PCA dilaudid ordered. Will add and titrate meds as needed  - VBG PRN to watch for development of hypercapnia

## 2019-06-28 NOTE — PLAN OF CARE
Problem: Adult Inpatient Plan of Care  Goal: Plan of Care Review  Outcome: Ongoing (interventions implemented as appropriate)  Plan of care reviewed with patient and patient's significant other.  Remains on 2L nasal cannula, 02 sats 100%.  Precedex gtt and dilaudid PCA continued.  Ketamine gtt decreased to 2.5 mcg/kg/min by pain management, and nerve blocks d/c'd.  Lidocaine patches applied near incision.  PRN ativan given for anxiety.  Goldberg catheter d/c'd this morning, 1L UOP since removal.  To radiology for modified barium this afternoon, increased anxiety and work of breathing upon arrival so unable to preform thorough study.  Pt remains NPO at this time.  PICC team to bedside today, no adequate veins so procedure will be preformed tomorrow in IR. CT's remain in place, minimal serosanguinous output noted.  VSS throughout the day, see flowsheet for full assessment.

## 2019-06-28 NOTE — PLAN OF CARE
Problem: SLP Goal  Goal: SLP Goal  Speech Language Pathology Goals  Goals expected to be met by 7/4:  1. Patient will participate in ongoing swallow assessment.   2. When determined by SLP, patient may benefit from a repeat modified barium swallow study to further objectively assess swallow function/safety.        Goals remain appropriate.   Emily P. Abadie M.S., CCC-SLP  Speech Language Pathologist  [phone]  06/28/2019

## 2019-06-28 NOTE — PROGRESS NOTES
[facility]  Lung Transplant  Progress Note - Critical Care    Patient Name: Juanita Ibarra  MRN: [id number]  Admission Date: 6/17/2019  Hospital Length of Stay: 10 days  Post-Operative Day: 1842 (Lung), 10 (Lung)  Attending Physician: Jake Alvarez MD  Primary Care Provider: Primary Doctor No     Subjective:     Interval History: Pain and anxiety better controlled, although more somnolent this morning.  Continues to have surgical incision pain with persistent cough but remains hemodynamically stable on minimal supplemental O2    Continuous Infusions:   dexmedetomidine (PRECEDEX) infusion 1 mcg/kg/hr (06/28/19 1021)    dextrose 5 % and 0.45 % NaCl with KCl 20 mEq 5 mL/hr at 06/28/19 1000    HYDROmorphone PCA 0.5 mg/mL in 30mL 0.9% Sodium Chloride in a 35mL MONOject Detach Syringe      insulin (HUMAN R) infusion (adults) 0.8 Units/hr (06/26/19 1900)    nicardipine Stopped (06/24/19 0200)     Scheduled Meds:   acetylcysteine 200 mg/ml (20%)  4 mL Nebulization Q12H    ciprofloxacin  400 mg Intravenous Q8H    enoxaparin  40 mg Subcutaneous Daily    famotidine (PF)  20 mg Intravenous BID    fluconazole (DIFLUCAN) IVPB  400 mg Intravenous Q24H    ganciclovir (CYTOVENE) IVPB  5 mg/kg Intravenous Q12H    glycerin 99.5%  100 mL Rectal ED 1 Time    And    magnesium citrate  296 mL Rectal ED 1 Time    And    sodium chloride 0.9%  500 mL Rectal ED 1 Time    levalbuterol  1.25 mg Nebulization Q6H WAKE    lidocaine  1 patch Transdermal Q24H    lidocaine  1 patch Transdermal Q24H    methocarbamol (ROBAXIN) IVPB  1,000 mg Intravenous Q8H    methylPREDNISolone sodium succinate  20 mg Intravenous Daily    metoprolol  5 mg Intravenous Once    mycophenolate (CELLCEPT) IVPB  500 mg Intravenous BID    polyethylene glycol  17 g Per NG tube BID    sodium chloride 0.9%  10 mL Intravenous Q6H    sodium chloride 7%  4 mL Nebulization Q12H    tacrolimus  2 mg Sublingual BID    vancomycin  (VANCOCIN) IVPB  750 mg Intravenous Q12H     PRN Meds:bisacodyl, Dextrose 10% Bolus, Dextrose 10% Bolus, dextrose 50%, dextrose 50%, diphenhydrAMINE, glucagon (human recombinant), glucose, glucose, HYDROmorphone, insulin aspart U-100, lactulose, levalbuterol, lipase-protease-amylase (VIOKACE) 20,880-78,300- 78,300 units, lorazepam, magnesium sulfate IVPB, metoclopramide HCl, naloxone, OLANZapine, ondansetron, ondansetron, oxyCODONE, oxyCODONE, potassium chloride in water **AND** potassium chloride in water **AND** potassium chloride in water, promethazine (PHENERGAN) IVPB, Flushing PICC Protocol **AND** sodium chloride 0.9% **AND** sodium chloride 0.9%    Review of patient's allergies indicates:   Allergen Reactions    Albuterol Palpitations    Colistin Anaphylaxis    Vancomycin analogues      Infusion reaction that does not resolve with slowing  Pt. States she can tolerate it when given with 50 mg Benadryl and ran over 3 hours    Neupogen [filgrastim] Other (See Comments)     Ostealgia after five daily doses of 300 mcg.      Bactrim [sulfamethoxazole-trimethoprim] Hives    Ceftazidime Hives     Pt stated can tolerate cefapine not ceftazidime    Ceftazidime     Dronabinol Other (See Comments)     Mental changes/hallucinations    Haldol [haloperidol lactate] Other (See Comments)     Seizure like activity    Nsaids (non-steroidal anti-inflammatory drug)      Cannot have due to lung transplant    Adhesive Rash     Cloth tape- please use tegaderm or paper tape    Aztreonam Rash    Ciprofloxacin Nausea And Vomiting     Projectile N/V, per patient.  Unwilling to retry therapy.       Review of Systems   Constitutional: Positive for fatigue. Negative for appetite change, chills, diaphoresis and fever.   HENT: Negative for congestion, postnasal drip, rhinorrhea, sinus pressure and sinus pain.    Respiratory: Positive for cough, chest tightness and shortness of breath. Negative for wheezing.    Cardiovascular:  Positive for chest pain. Negative for palpitations and leg swelling.   Gastrointestinal: Positive for abdominal pain and constipation. Negative for abdominal distention, diarrhea, nausea and vomiting.   Genitourinary: Negative for dysuria.   Skin: Negative for rash and wound.   Neurological: Positive for weakness. Negative for dizziness, syncope and headaches.   Psychiatric/Behavioral: The patient is nervous/anxious.      Objective:   Physical Exam   Constitutional: She appears well-developed and well-nourished. She is easily aroused. No distress. Nasal cannula in place.   HENT:   Head: Normocephalic and atraumatic.   Mouth/Throat: Oropharynx is clear and moist. No oropharyngeal exudate.   Eyes: Conjunctivae are normal. No scleral icterus.   Neck: No JVD present.   Right IJ CVC, CDI   Cardiovascular: Regular rhythm. Tachycardia present. Exam reveals no friction rub.   No murmur heard.  Pulmonary/Chest: No accessory muscle usage. No respiratory distress. She has no decreased breath sounds. She has no wheezes. She has rhonchi. She has no rales.   Bilateral pleural and mediastinal chest tubes in place  Cheyne-Gagnon breathing pattern   Abdominal: Soft. She exhibits no distension. There is no tenderness.   Protuberant abdomen   Musculoskeletal: She exhibits no edema.   Neurological: She is alert and easily aroused. No cranial nerve deficit.   Skin: Skin is warm and dry. No rash noted. She is not diaphoretic. No erythema.   Psychiatric: Her affect is labile.   Nursing note and vitals reviewed.        Vital Signs (Most Recent):  Temp: 99.1 °F (37.3 °C) (06/28/19 0700)  Pulse: (!) 145 (06/28/19 1000)  Resp: (!) 23 (06/28/19 1000)  BP: (!) 139/101 (06/28/19 1000)  SpO2: 100 % (06/28/19 1000) Vital Signs (24h Range):  Temp:  [97.9 °F (36.6 °C)-99.1 °F (37.3 °C)] 99.1 °F (37.3 °C)  Pulse:  [] 145  Resp:  [11-40] 23  SpO2:  [90 %-100 %] 100 %  BP: ()/() 139/101     Weight: 63 kg (138 lb 14.2 oz)  Body mass  index is 26.24 kg/m².      Intake/Output Summary (Last 24 hours) at 6/28/2019 1217  Last data filed at 6/28/2019 0900  Gross per 24 hour   Intake 2843.23 ml   Output 1870 ml   Net 973.23 ml       Ventilator Data:     Oxygen Concentration (%):  [28] 28    Hemodynamic Parameters:       Lines/Drains:       Introducer right internal jugular (Active)   Specific Qualities Infusing 6/28/2019  7:00 AM   Dressing Status Biopatch in place;Clean;Dry;Intact 6/28/2019  7:00 AM   Dressing Intervention Dressing reinforced 6/28/2019  3:00 AM   Dressing Change Due 06/29/19 6/28/2019  3:00 AM   Daily Line Review Performed 6/28/2019  7:00 AM   Number of days:             Port A Cath Single Lumen 06/24/14 1200 right subclavian (Active)   Accessed by: Radha Lanza 6/24/2019  5:00 AM   Dressing Type Transparent 6/28/2019  7:00 AM   Dressing Status Biopatch in place;Clean;Dry;Intact 6/28/2019  7:00 AM   Dressing Intervention Dressing reinforced 6/28/2019  3:00 AM   Dressing Change Due 06/30/19 6/28/2019  3:00 AM   Line Status Infusing 6/28/2019  7:00 AM   Flush Performed Yes 6/27/2019  3:00 PM   Date to be Reflushed 06/18/19 6/17/2019  8:00 PM   Daily Line Review Performed 6/28/2019  7:00 AM   Type of Needle Nicole 6/28/2019  3:00 AM   Gauge 20 6/26/2019  3:00 AM   Needle Length 1 in 6/26/2019  3:00 AM   Needle Status Left in place 6/28/2019  3:00 AM   Needle Insertion Date 06/24/19 6/26/2019  3:00 AM   Needle Insertion Time 0500 6/26/2019  3:00 AM   Number of days: 1830            Percutaneous Central Line Insertion/Assessment - double lumen  06/18/19 0510 (Active)   Dressing biopatch in place;dressing dry and intact 6/28/2019  7:00 AM   Securement secured w/ sutures 6/28/2019  7:00 AM   Additional Site Signs no drainage;no streak formation;no palpable cord;no pain;no edema;no warmth;no erythema 6/28/2019  7:00 AM   Distal Patency/Care infusing 6/28/2019  7:00 AM   Proximal Patency/Care infusing 6/28/2019  7:00 AM   Waveform normal  6/28/2019  3:00 AM   Line Interventions line leveled/zeroed 6/28/2019  3:00 AM   Dressing Change Due 06/29/19 6/28/2019  3:00 AM   Daily Line Review Performed 6/28/2019  7:00 AM   Number of days: 10            Percutaneous Central Line Insertion/Assessment - triple lumen  right internal jugular (Active)   Dressing biopatch in place;dressing dry and intact 6/28/2019  7:00 AM   Securement secured w/ sutures 6/28/2019  7:00 AM   Additional Site Signs no drainage;no streak formation;no palpable cord;no pain;no edema;no warmth;no erythema 6/28/2019  7:00 AM   Distal Patency/Care infusing 6/28/2019  7:00 AM   Medial Patency/Care infusing 6/28/2019  7:00 AM   Proximal Patency/Care infusing 6/28/2019  7:00 AM   Waveform normal 6/28/2019  7:00 AM   Line Interventions line leveled/zeroed 6/28/2019  7:00 AM   Dressing Change Due 06/29/19 6/28/2019  3:00 AM   Daily Line Review Performed 6/28/2019  7:00 AM   Number of days:             Peripheral IV - Single Lumen 06/27/19 0917 22 G Right Forearm (Active)   Site Assessment Clean;Dry;Intact 6/28/2019  7:00 AM   Line Status Flushed;Saline locked 6/28/2019  7:00 AM   Dressing Status Clean;Dry;Intact 6/28/2019  7:00 AM   Dressing Intervention Dressing reinforced 6/28/2019  3:00 AM   Dressing Change Due 07/01/19 6/28/2019  3:00 AM   Site Change Due 07/01/19 6/28/2019  3:00 AM   Reason Not Rotated Not due 6/28/2019  7:00 AM   Number of days: 1            Y Chest Tube 1 and 2 06/18/19 1151 1 Right Mediastinal 19 Fr. 2 Right Pleural 19 Fr. (Active)   Function -20 cm H2O 6/28/2019  7:00 AM   Air Leak/Fluctuation air leak present 6/28/2019  7:00 AM   Safety all tubing connections taped;2 rubber-tipped hemostats w/ patient;all connections secured;suction checked 6/28/2019  7:00 AM   Securement tubing secured to body distal to insertion site w/ tape 6/28/2019  7:00 AM   Left Subcutaneous Emphysema none present 6/28/2019  7:00 AM   Right Subcutaneous Emphysema none present 6/28/2019  7:00 AM    Patency Intervention Tip/tilt 6/28/2019  7:00 AM   Drainage Description 1 Serosanguineous 6/28/2019  7:00 AM   Tube 1 Dressing Appearance occlusive gauze dressing intact;clean and dry 6/28/2019  7:00 AM   Tube 1 Dressing Care dressing reinforced 6/28/2019  3:00 AM   Site Assessment 1 Not assessed 6/28/2019  7:00 AM   Surrounding Skin 1 Unable to view 6/28/2019  7:00 AM   Drainage Description 2 Serosanguineous 6/28/2019  7:00 AM   Tube 2 Dressing Appearance occlusive gauze dressing intact;clean and dry 6/28/2019  7:00 AM   Tube 2 Dressing Care dressing reinforced 6/28/2019  3:00 AM   Site Assessment 2 Not assessed 6/28/2019  7:00 AM   Surrounding Skin Unable to view 6/28/2019  7:00 AM   Output (mL) 40 mL 6/28/2019  7:00 AM   Number of days: 10            Y Chest Tube 3 and 4 06/18/19 1151 3 Left Pleural 19 Fr. 4 Left Mediastinal 19 Fr. (Active)   Function -20 cm H2O 6/28/2019  7:00 AM   Air Leak/Fluctuation air leak present 6/28/2019  7:00 AM   Safety all tubing connections taped;2 rubber-tipped hemostats w/ patient;all connections secured;suction checked 6/28/2019  7:00 AM   Securement tubing secured to body distal to insertion site w/ tape 6/28/2019  7:00 AM   Left Subcutaneous Emphysema none present 6/28/2019  7:00 AM   Right Subcutaneous Emphysema none present 6/28/2019  7:00 AM   Patency Intervention Tip/tilt 6/28/2019  7:00 AM   Drainage Description 3 Serosanguineous 6/28/2019  7:00 AM   Tube 3 Dressing Appearance occlusive gauze dressing intact;clean and dry 6/28/2019  7:00 AM   Tube 3 Dressing Care dressing reinforced 6/28/2019  3:00 AM   Site Assessment 3 Not assessed 6/28/2019  7:00 AM   Surrounding Skin 3 Unable to view 6/28/2019  7:00 AM   Drainage Description 4 Serosanguineous 6/28/2019  7:00 AM   Tube 4 Dressing Appearance occlusive gauze dressing intact;clean and dry 6/28/2019  7:00 AM   Tube 4 Dressing Care dressing reinforced 6/28/2019  3:00 AM   Site Assessment 4 Not assessed 6/28/2019  7:00 AM    Surrounding Skin 4 Unable to view 6/28/2019  7:00 AM   Output (mL) 10 mL 6/28/2019  7:00 AM   Number of days: 10       Significant Labs:  CBC:  Recent Labs   Lab 06/28/19  0400   WBC 14.19*   RBC 3.26*   HGB 9.0*   HCT 30.8*      MCV 95   MCH 27.6   MCHC 29.2*     BMP:  Recent Labs   Lab 06/28/19  0400   *   K 4.4      CO2 22*   BUN 24*   CREATININE 0.8   CALCIUM 8.4*      Tacrolimus Levels:  Recent Labs   Lab 06/28/19  0400   TACROLIMUS 7.5     Microbiology:  Microbiology Results (last 7 days)     Procedure Component Value Units Date/Time    Fungus culture [[id number]]  (Abnormal) Collected:  06/18/19 0930    Order Status:  Completed Specimen:  Tissue from Lung, Left Updated:  06/27/19 1055     Fungus (Mycology) Culture CANDIDA ALBICANS  Moderate      Narrative:       Donor lung    Fungus culture [[id number]]  (Abnormal) Collected:  06/23/19 1735    Order Status:  Completed Specimen:  Respiratory from Bronchial Wash Updated:  06/27/19 1048     Fungus (Mycology) Culture BRANDI GLABRATA  Few      Culture, Respiratory [[id number]]  (Susceptibility) Collected:  06/25/19 0900    Order Status:  Completed Specimen:  Respiratory from Bronchial Wash Updated:  06/27/19 0905     Respiratory Culture No S aureus isolated.      PSEUDOMONAS AERUGINOSA   Few       Gram Stain (Respiratory) <10 epithelial cells per low power field.     Gram Stain (Respiratory) Many WBC's     Gram Stain (Respiratory) No organisms seen    Narrative:       Bronchial Wash    AFB Culture & Smear [[id number]] Collected:  06/25/19 0900    Order Status:  Completed Specimen:  Respiratory from Bronchial Wash Updated:  06/26/19 2127     AFB Culture & Smear Culture in progress     AFB CULTURE STAIN No acid fast bacilli seen.    Narrative:       Bronchial Wash    Fungus culture [[id number]] Collected:  06/25/19 0900    Order Status:  Completed Specimen:  Respiratory from Bronchial Wash Updated:  06/26/19 1102     Fungus (Mycology) Culture  Culture in progress    Narrative:       Bronchial Wash    Fungus culture [[id number]] Collected:  06/20/19 0808    Order Status:  Completed Specimen:  Respiratory from Bronchial Wash Updated:  06/26/19 1101     Fungus (Mycology) Culture Culture in progress    Culture, Respiratory with Gram Stain [[id number]]  (Susceptibility) Collected:  06/23/19 1735    Order Status:  Completed Specimen:  Respiratory from Bronchial Wash Updated:  06/26/19 0752     Respiratory Culture No S aureus isolated.      PSEUDOMONAS AERUGINOSA   Few  Normal respiratory anibal also present       Gram Stain (Respiratory) <10 epithelial cells per low power field.     Gram Stain (Respiratory) Few WBC's     Gram Stain (Respiratory) Rare Gram negative rods    AFB Culture & Smear [[id number]] Collected:  06/23/19 1735    Order Status:  Completed Specimen:  Respiratory from Bronchial Wash Updated:  06/24/19 2127     AFB Culture & Smear Culture in progress     AFB CULTURE STAIN No acid fast bacilli seen.    Culture, Anaerobe [[id number]] Collected:  06/18/19 0930    Order Status:  Completed Specimen:  Tissue from Lung, Left Updated:  06/24/19 1107     Anaerobic Culture No anaerobes isolated    Narrative:       Donor lung    Culture, Respiratory with Gram Stain [[id number]]  (Susceptibility) Collected:  06/20/19 0808    Order Status:  Completed Specimen:  Respiratory from Bronchial Wash Updated:  06/22/19 1018     Respiratory Culture No S aureus isolated.      PSEUDOMONAS AERUGINOSA   Rare       Gram Stain (Respiratory) <10 epithelial cells per low power field.     Gram Stain (Respiratory) No WBC's     Gram Stain (Respiratory) No organisms seen    AFB Culture & Smear [[id number]] Collected:  06/20/19 0808    Order Status:  Completed Specimen:  Respiratory from Bronchial Wash Updated:  06/21/19 2127     AFB Culture & Smear Culture in progress     AFB CULTURE STAIN No acid fast bacilli seen.    Fungus culture [[id number]]     Order Status:  Canceled  Specimen:  Respiratory from Bronchial Wash     Culture, Respiratory with Gram Stain [[id number]]     Order Status:  Canceled Specimen:  Respiratory from Bronchial Wash     AFB Culture & Smear [[id number]]     Order Status:  Canceled Specimen:  Respiratory from Bronchial Wash           I have reviewed all pertinent labs within the past 24 hours.    Diagnostic Results:  Chest X-Ray: no new chest imaging        Assessment/Plan:     * S/P lung transplant  POD#6 s/p bilateral lung transplant (re-transplant) for CARLOS EDUARDO. 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 Remains PGD 0. S/p bronch and extubated to HFNC on POD#2. Re-intubated on  CTS to manage chest tubes.   - now extubated to NC and doing well. Continue Xopenex nebs Q6 hrs.  - Continue immunosuppression and ppx per protocol.    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 Continue Ciprofloxacin, Tobramycin x1 on 6/22.   - Repeat cultures from 6/23 with NGTD.    Acute hyperactive delirium due to another medical condition  - Likely multifactorial including post-op anesthesia, dissociative narcotics, ICU delirium  - Continue Ativan PRN, though judicious with use; Dilaudid PCA.  Currently on Precedex and low dose Ketamine  - Precedex now at half dose and planning to D/C Ketamine today  - placing NGT today.  Will restart home Seroquel via NGT    Acute blood loss anemia  - s/p 6u PRBCs, 2u plts, 1 cryo, and 4 FFP angie-operatively. Remains hemodynamically  - Conservative with future transfusions. Repeat CBC if chest tube has copious bloody output    Steroid-induced hyperglycemia  - Endocrine consulted.  Now off Insulin gtt.  Add and titrate insulin to maintain goal BG <160    Prophylactic antibiotic  - CMV D-/R+. Continue OIP with ganciclovir, fluconazole, and dapsone.   - Bronch wash 6/20 with  Pseudomonas. Continue vancomycin given history of MRSA.   - Continue ciprofloxacin 400 mg Q8hrs. Repeat bronchial wash  cultures from 6/23 with NGTD.         Immunosuppression  - Previously on tacrolimus, MMF, and daily prednisone. S/p solumedrol in OR, basiliximab on POD#0 and POD#4.   - Continue MMF, tacrolimus, and steroid taper. Tacrolimus increased to 1.5 mg BID on 6/23.   - Monitor daily tacrolimus levels and adjust dose as needed.     CF related Pancreatic insufficiency  - Viokace enzymes with trickle tube feedings. Hold VioKace when tube feeds are held.        Preventive Measures: Nutrition: Goal: tube feeds, DVT: continue prophyllaxis, Head of Bet: elevate, Physical Therapy: as tolerated    Counseling/Consultation:I discussed the case with Dr. Antonio Antunez MD  Lung Transplant  [facility]

## 2019-06-28 NOTE — ASSESSMENT & PLAN NOTE
BG goal 140 - 180.     Transition IV insulin infusion at 0.8 u/hr  Low dose correction scale  BG monitoring every 4 hours while NPO    ADDENDUM: Change correction scale to moderate dose due to persistent daytime hyperglycemia    Discharge planning: TBD

## 2019-06-28 NOTE — SUBJECTIVE & OBJECTIVE
"Interval HPI:   Overnight events: Remains in ICU, NAEON. BG elevated during the day but well controlled overnight on transition IV insulin infusion at 0.8 u/hr with correction scale.  Solu medrol 20 mg IV daily; steroid taper per primary.  On IV antibiotics.  Eating:   NPO  Nausea: No  Hypoglycemia and intervention: No  Fever: No  TPN and/or TF: No    /85 (BP Location: Right arm, Patient Position: Lying)   Pulse (!) 136   Temp 99.1 °F (37.3 °C) (Oral)   Resp (!) 31   Ht 5' 1" (1.549 m)   Wt 63 kg (138 lb 14.2 oz)   LMP 10/02/2016   SpO2 (!) 90%   Breastfeeding? No   BMI 26.24 kg/m²     Labs Reviewed and Include    Recent Labs   Lab 06/28/19  0400   *   CALCIUM 8.4*   ALBUMIN 2.5*   PROT 6.1   *   K 4.4   CO2 22*      BUN 24*   CREATININE 0.8   ALKPHOS 219*   *   AST 47*   BILITOT 0.4     Lab Results   Component Value Date    WBC 14.19 (H) 06/28/2019    HGB 9.0 (L) 06/28/2019    HCT 30.8 (L) 06/28/2019    MCV 95 06/28/2019     06/28/2019     No results for input(s): TSH, FREET4 in the last 168 hours.  Lab Results   Component Value Date    HGBA1C 4.7 01/09/2019       Nutritional status:   Body mass index is 26.24 kg/m².  Lab Results   Component Value Date    ALBUMIN 2.5 (L) 06/28/2019    ALBUMIN 2.6 (L) 06/27/2019    ALBUMIN 2.4 (L) 06/26/2019     Lab Results   Component Value Date    PREALBUMIN 15 (L) 05/29/2014    PREALBUMIN 11 (L) 05/09/2014    PREALBUMIN 18 (L) 03/19/2014       Estimated Creatinine Clearance: 87.5 mL/min (based on SCr of 0.8 mg/dL).    Accu-Checks  Recent Labs     06/26/19  1938 06/26/19  2315 06/27/19  0404 06/27/19  0820 06/27/19  1226 06/27/19  1619 06/27/19 2009 06/28/19  0029 06/28/19  0400 06/28/19  0810   POCTGLUCOSE 115* 229* 168* 144* 235* 263* 247* 148* 125* 175*       Current Medications and/or Treatments Impacting Glycemic Control  Immunotherapy:    Immunosuppressants         Stop Route Frequency     tacrolimus capsule 2 mg      -- SL 2 " times daily     mycophenolate (CELLCEPT) 500 mg in dextrose 5 % 100 mL IVPB      -- IV 2 times daily        Steroids:   Hormones (From admission, onward)    Start     Stop Route Frequency Ordered    06/27/19 0945  methylPREDNISolone sodium succinate injection 20 mg      -- IV Daily 06/27/19 0831    06/26/19 1827  dexAMETHasone sodium phos (PF) 10 mg/mL injection     Note to Pharmacy:  Created by cabinet override    06/27 0629 06/26/19 1827        Pressors:    Autonomic Drugs (From admission, onward)    None        Hyperglycemia/Diabetes Medications:   Antihyperglycemics (From admission, onward)    Start     Stop Route Frequency Ordered    06/20/19 1545  insulin regular (Humulin R) 100 Units in sodium chloride 0.9% 100 mL infusion      -- IV Continuous 06/20/19 1442    06/20/19 1542  insulin aspart U-100 pen 0-4 Units      -- SubQ As needed (PRN) 06/20/19 1442

## 2019-06-28 NOTE — PT/OT/SLP PROGRESS
Physical Therapy      Patient Name:  Juanita Ibarra   MRN:  [id number]    Patient not seen today secondary to (Pt not seen 2nd to work of breathing laying in bed.  RN agreed to hold treatment.). Will follow-up at a later date..    Sindy Koo, PT   6/28/2019

## 2019-06-28 NOTE — PLAN OF CARE
Problem: Adult Inpatient Plan of Care  Goal: Plan of Care Review  Outcome: Ongoing (interventions implemented as appropriate)  Pt resting in bed throughout shift, All VSS. Afebrile, SBP <160, NSR-sinus tachy, O2 sats >95% on 2L NC. AAOx4. Gtts: Precedex @ 1.4 mcg/kg/hr, Ketamine @ 2.5 mcg/kg/min, dilaudid PCA, MIVF @5. Accuchecks done q4 and SSI given per orders. 1 large unmeasured urine occurrence. Minimal serosanguineous output from all chest tubes. Ativan given x1, zyprexa given x1. Pt requested RT to bedside for CPT to help with secretions. Mg 1.7 replacing with 2G. Pt pre-medicated with benadryl before vanc administered. Plan to go to IR for tunneled cath placement. Remains NPO.  POC reviewed with pt and  and all questions answered. See flowsheets for full assessment data. Will continue to monitor.

## 2019-06-28 NOTE — ASSESSMENT & PLAN NOTE
Glucocorticoids markedly increase blood glucose. Expect the steroid taper will help glucose control.

## 2019-06-28 NOTE — PLAN OF CARE
Problem: Adult Inpatient Plan of Care  Goal: Plan of Care Review  Outcome: Ongoing (interventions implemented as appropriate)  Plan of care reviewed with patient and patient's significant other.  Remains on 2L nasal cannula with 02 sats >95%. Precedex gtt and dilaudid PCA continued.  Ketamine gtt d/c'd this morning by pain management. PRN ativan given for anxiety.  NG tube inserted this afternoon, placed to LIWS.  UOP remains adequate, 1 BM this shift. Metoprolol IVP x2 today for tachycardia with moderate results, HR remains 115-120s. OOB to chair x 2.5 hrs.  PICC line insertion canceled due to labored breathing. Triple lumen pulled, introducer remains in place. VS currently stable, see flowsheet for full assessment.

## 2019-06-28 NOTE — ASSESSMENT & PLAN NOTE
- Likely multifactorial including post-op anesthesia, dissociative narcotics, ICU delirium  - Continue Ativan PRN, though judicious with use; Dilaudid PCA.  Currently on Precedex and low dose Ketamine  - Precedex now at half dose and planning to D/C Ketamine today  - placing NGT today.  Will restart home Seroquel via NGT

## 2019-06-28 NOTE — PROGRESS NOTES
"[facility]  Adult Nutrition  Progress Note    SUMMARY       Recommendations  Recommendation/Intervention:   1. Recommend initiating TF of Impact Peptide 1.5 at a goal rate of 45 mL/hr - to provide 1620 kcal/day, 102g protein/day, and 812mL free fluid/day.   2. PO diet per SLP.   RD to monitor.    Goals: Patient to receive nutrition by RD follow-up  Nutrition Goal Status: goal not met  Communication of RD Recs: reviewed with RN    Reason for Assessment  Reason For Assessment: RD follow-up  Diagnosis: transplant/postoperative complications(s/p redo BLTx 6/18)  Relevant Medical History: CF s/p BLTx 6/2014, HTN, seizures  Interdisciplinary Rounds: attended  General Information Comments: Extubated 6/26. MBSS yesterday, recommended NPO status. Plan to place NG today. Physical exam remains unchanged, continues to appear nourished.  Nutrition Discharge Planning: TSU RD to provide post-transplant diet education prior to discharge.    Nutrition Risk Screen  Nutrition Risk Screen: dysphagia or difficulty swallowing    Nutrition/Diet History  Spiritual, Cultural Beliefs, Hindu Practices, Values that Affect Care: no  Factors Affecting Nutritional Intake: NPO, difficulty/impaired swallowing    Anthropometrics  Temp: 98.9 °F (37.2 °C)  Height Method: Stated  Height: 5' 1" (154.9 cm)  Height (inches): 61 in  Weight Method: Bed Scale  Weight: 63 kg (138 lb 14.2 oz)  Weight (lb): 138.89 lb  Ideal Body Weight (IBW), Female: 105 lb  % Ideal Body Weight, Female (lb): 123.77 lb  BMI (Calculated): 24.6  BMI Grade: 18.5-24.9 - normal    Lab/Procedures/Meds  Pertinent Labs Reviewed: reviewed  Pertinent Labs Comments: Na 135, Glu 143, Ca 8.4, Alb 2.5  Pertinent Medications Reviewed: reviewed  Pertinent Medications Comments: famotidine, methylperdnisolone, prograf, precedex, isnulin drip, cardene    Estimated/Assessed Needs  Weight Used For Calorie Calculations: 63 kg (138 lb 14.2 oz)  Energy Calorie Requirements " (kcal): 1575 kcal/day  Energy Need Method: Kcal/kg(25)  Protein Requirements: 76-88 g/day(1.2-1.4 g/kg)  Weight Used For Protein Calculations: 63 kg (138 lb 14.2 oz)  Fluid Requirements (mL): 1 mL/kcal or per MD  Estimated Fluid Requirement Method: RDA Method  RDA Method (mL): 1575    Nutrition Prescription Ordered  Current Diet Order: NPO    Evaluation of Received Nutrient/Fluid Intake  I/O: -4.8L since admit  Comments: LBM 6/23  % Intake of Estimated Energy Needs: 0 - 25 %  % Meal Intake: NPO    Nutrition Risk  Level of Risk/Frequency of Follow-up: high(2x/week)     Assessment and Plan  Nutrition Problem  Increased nutrient needs     Related to (etiology):   Physiological causes related to healing     Signs and Symptoms (as evidenced by):   S/p redo BLTx 6/18      Interventions/Recommendations (treatment strategy):  Collaboration and referral of nutrition care     Nutrition Diagnosis Status:   Continues      Nutrition Problem  Inadequate energy intake    Related to (etiology):   Decreased ability to consume sufficient energy    Signs and Symptoms (as evidenced by):   NPO with no alternative means of nutrition at this time    Interventions/Recommendations (treatment strategy):  Collaboration of nutrition care with other providers    Nutrition Diagnosis Status:   New    Monitor and Evaluation  Food and Nutrient Intake: energy intake, enteral nutrition intake  Food and Nutrient Adminstration: enteral and parenteral nutrition administration  Anthropometric Measurements: weight, weight change  Biochemical Data, Medical Tests and Procedures: electrolyte and renal panel, gastrointestinal profile, inflammatory profile  Nutrition-Focused Physical Findings: overall appearance     Nutrition Follow-Up  RD Follow-up?: Yes

## 2019-06-28 NOTE — ADDENDUM NOTE
Addendum  created 06/28/19 144 by Kenny Merino MD    Charge Capture section accepted, Cosign clinical note with attestation

## 2019-06-28 NOTE — SUBJECTIVE & OBJECTIVE
Subjective:     Interval History: Pain and anxiety better controlled, although more somnolent this morning.  Continues to have surgical incision pain with persistent cough but remains hemodynamically stable on minimal supplemental O2    Continuous Infusions:   dexmedetomidine (PRECEDEX) infusion 1 mcg/kg/hr (06/28/19 1021)    dextrose 5 % and 0.45 % NaCl with KCl 20 mEq 5 mL/hr at 06/28/19 1000    HYDROmorphone PCA 0.5 mg/mL in 30mL 0.9% Sodium Chloride in a 35mL MONOject Detach Syringe      insulin (HUMAN R) infusion (adults) 0.8 Units/hr (06/26/19 1900)    nicardipine Stopped (06/24/19 0200)     Scheduled Meds:   acetylcysteine 200 mg/ml (20%)  4 mL Nebulization Q12H    ciprofloxacin  400 mg Intravenous Q8H    enoxaparin  40 mg Subcutaneous Daily    famotidine (PF)  20 mg Intravenous BID    fluconazole (DIFLUCAN) IVPB  400 mg Intravenous Q24H    ganciclovir (CYTOVENE) IVPB  5 mg/kg Intravenous Q12H    glycerin 99.5%  100 mL Rectal ED 1 Time    And    magnesium citrate  296 mL Rectal ED 1 Time    And    sodium chloride 0.9%  500 mL Rectal ED 1 Time    levalbuterol  1.25 mg Nebulization Q6H WAKE    lidocaine  1 patch Transdermal Q24H    lidocaine  1 patch Transdermal Q24H    methocarbamol (ROBAXIN) IVPB  1,000 mg Intravenous Q8H    methylPREDNISolone sodium succinate  20 mg Intravenous Daily    metoprolol  5 mg Intravenous Once    mycophenolate (CELLCEPT) IVPB  500 mg Intravenous BID    polyethylene glycol  17 g Per NG tube BID    sodium chloride 0.9%  10 mL Intravenous Q6H    sodium chloride 7%  4 mL Nebulization Q12H    tacrolimus  2 mg Sublingual BID    vancomycin (VANCOCIN) IVPB  750 mg Intravenous Q12H     PRN Meds:bisacodyl, Dextrose 10% Bolus, Dextrose 10% Bolus, dextrose 50%, dextrose 50%, diphenhydrAMINE, glucagon (human recombinant), glucose, glucose, HYDROmorphone, insulin aspart U-100, lactulose, levalbuterol, lipase-protease-amylase (VIOKACE) 20,880-78,300- 78,300 units,  lorazepam, magnesium sulfate IVPB, metoclopramide HCl, naloxone, OLANZapine, ondansetron, ondansetron, oxyCODONE, oxyCODONE, potassium chloride in water **AND** potassium chloride in water **AND** potassium chloride in water, promethazine (PHENERGAN) IVPB, Flushing PICC Protocol **AND** sodium chloride 0.9% **AND** sodium chloride 0.9%    Review of patient's allergies indicates:   Allergen Reactions    Albuterol Palpitations    Colistin Anaphylaxis    Vancomycin analogues      Infusion reaction that does not resolve with slowing  Pt. States she can tolerate it when given with 50 mg Benadryl and ran over 3 hours    Neupogen [filgrastim] Other (See Comments)     Ostealgia after five daily doses of 300 mcg.      Bactrim [sulfamethoxazole-trimethoprim] Hives    Ceftazidime Hives     Pt stated can tolerate cefapine not ceftazidime    Ceftazidime     Dronabinol Other (See Comments)     Mental changes/hallucinations    Haldol [haloperidol lactate] Other (See Comments)     Seizure like activity    Nsaids (non-steroidal anti-inflammatory drug)      Cannot have due to lung transplant    Adhesive Rash     Cloth tape- please use tegaderm or paper tape    Aztreonam Rash    Ciprofloxacin Nausea And Vomiting     Projectile N/V, per patient.  Unwilling to retry therapy.       Review of Systems   Constitutional: Positive for fatigue. Negative for appetite change, chills, diaphoresis and fever.   HENT: Negative for congestion, postnasal drip, rhinorrhea, sinus pressure and sinus pain.    Respiratory: Positive for cough, chest tightness and shortness of breath. Negative for wheezing.    Cardiovascular: Positive for chest pain. Negative for palpitations and leg swelling.   Gastrointestinal: Positive for abdominal pain and constipation. Negative for abdominal distention, diarrhea, nausea and vomiting.   Genitourinary: Negative for dysuria.   Skin: Negative for rash and wound.   Neurological: Positive for weakness. Negative  for dizziness, syncope and headaches.   Psychiatric/Behavioral: The patient is nervous/anxious.      Objective:   Physical Exam   Constitutional: She appears well-developed and well-nourished. She is easily aroused. No distress. Nasal cannula in place.   HENT:   Head: Normocephalic and atraumatic.   Mouth/Throat: Oropharynx is clear and moist. No oropharyngeal exudate.   Eyes: Conjunctivae are normal. No scleral icterus.   Neck: No JVD present.   Right IJ CVC, CDI   Cardiovascular: Regular rhythm. Tachycardia present. Exam reveals no friction rub.   No murmur heard.  Pulmonary/Chest: No accessory muscle usage. No respiratory distress. She has no decreased breath sounds. She has no wheezes. She has rhonchi. She has no rales.   Bilateral pleural and mediastinal chest tubes in place  Cheyne-Gagnon breathing pattern   Abdominal: Soft. She exhibits no distension. There is no tenderness.   Protuberant abdomen   Musculoskeletal: She exhibits no edema.   Neurological: She is alert and easily aroused. No cranial nerve deficit.   Skin: Skin is warm and dry. No rash noted. She is not diaphoretic. No erythema.   Psychiatric: Her affect is labile.   Nursing note and vitals reviewed.        Vital Signs (Most Recent):  Temp: 99.1 °F (37.3 °C) (06/28/19 0700)  Pulse: (!) 145 (06/28/19 1000)  Resp: (!) 23 (06/28/19 1000)  BP: (!) 139/101 (06/28/19 1000)  SpO2: 100 % (06/28/19 1000) Vital Signs (24h Range):  Temp:  [97.9 °F (36.6 °C)-99.1 °F (37.3 °C)] 99.1 °F (37.3 °C)  Pulse:  [] 145  Resp:  [11-40] 23  SpO2:  [90 %-100 %] 100 %  BP: ()/() 139/101     Weight: 63 kg (138 lb 14.2 oz)  Body mass index is 26.24 kg/m².      Intake/Output Summary (Last 24 hours) at 6/28/2019 1217  Last data filed at 6/28/2019 0900  Gross per 24 hour   Intake 2843.23 ml   Output 1870 ml   Net 973.23 ml       Ventilator Data:     Oxygen Concentration (%):  [28] 28    Hemodynamic Parameters:       Lines/Drains:       Introducer right  internal jugular (Active)   Specific Qualities Infusing 6/28/2019  7:00 AM   Dressing Status Biopatch in place;Clean;Dry;Intact 6/28/2019  7:00 AM   Dressing Intervention Dressing reinforced 6/28/2019  3:00 AM   Dressing Change Due 06/29/19 6/28/2019  3:00 AM   Daily Line Review Performed 6/28/2019  7:00 AM   Number of days:             Port A Cath Single Lumen 06/24/14 1200 right subclavian (Active)   Accessed by: Radha Lanza 6/24/2019  5:00 AM   Dressing Type Transparent 6/28/2019  7:00 AM   Dressing Status Biopatch in place;Clean;Dry;Intact 6/28/2019  7:00 AM   Dressing Intervention Dressing reinforced 6/28/2019  3:00 AM   Dressing Change Due 06/30/19 6/28/2019  3:00 AM   Line Status Infusing 6/28/2019  7:00 AM   Flush Performed Yes 6/27/2019  3:00 PM   Date to be Reflushed 06/18/19 6/17/2019  8:00 PM   Daily Line Review Performed 6/28/2019  7:00 AM   Type of Needle Nicole 6/28/2019  3:00 AM   Gauge 20 6/26/2019  3:00 AM   Needle Length 1 in 6/26/2019  3:00 AM   Needle Status Left in place 6/28/2019  3:00 AM   Needle Insertion Date 06/24/19 6/26/2019  3:00 AM   Needle Insertion Time 0500 6/26/2019  3:00 AM   Number of days: 1830            Percutaneous Central Line Insertion/Assessment - double lumen  06/18/19 0510 (Active)   Dressing biopatch in place;dressing dry and intact 6/28/2019  7:00 AM   Securement secured w/ sutures 6/28/2019  7:00 AM   Additional Site Signs no drainage;no streak formation;no palpable cord;no pain;no edema;no warmth;no erythema 6/28/2019  7:00 AM   Distal Patency/Care infusing 6/28/2019  7:00 AM   Proximal Patency/Care infusing 6/28/2019  7:00 AM   Waveform normal 6/28/2019  3:00 AM   Line Interventions line leveled/zeroed 6/28/2019  3:00 AM   Dressing Change Due 06/29/19 6/28/2019  3:00 AM   Daily Line Review Performed 6/28/2019  7:00 AM   Number of days: 10            Percutaneous Central Line Insertion/Assessment - triple lumen  right internal jugular (Active)   Dressing biopatch  in place;dressing dry and intact 6/28/2019  7:00 AM   Securement secured w/ sutures 6/28/2019  7:00 AM   Additional Site Signs no drainage;no streak formation;no palpable cord;no pain;no edema;no warmth;no erythema 6/28/2019  7:00 AM   Distal Patency/Care infusing 6/28/2019  7:00 AM   Medial Patency/Care infusing 6/28/2019  7:00 AM   Proximal Patency/Care infusing 6/28/2019  7:00 AM   Waveform normal 6/28/2019  7:00 AM   Line Interventions line leveled/zeroed 6/28/2019  7:00 AM   Dressing Change Due 06/29/19 6/28/2019  3:00 AM   Daily Line Review Performed 6/28/2019  7:00 AM   Number of days:             Peripheral IV - Single Lumen 06/27/19 0917 22 G Right Forearm (Active)   Site Assessment Clean;Dry;Intact 6/28/2019  7:00 AM   Line Status Flushed;Saline locked 6/28/2019  7:00 AM   Dressing Status Clean;Dry;Intact 6/28/2019  7:00 AM   Dressing Intervention Dressing reinforced 6/28/2019  3:00 AM   Dressing Change Due 07/01/19 6/28/2019  3:00 AM   Site Change Due 07/01/19 6/28/2019  3:00 AM   Reason Not Rotated Not due 6/28/2019  7:00 AM   Number of days: 1            Y Chest Tube 1 and 2 06/18/19 1151 1 Right Mediastinal 19 Fr. 2 Right Pleural 19 Fr. (Active)   Function -20 cm H2O 6/28/2019  7:00 AM   Air Leak/Fluctuation air leak present 6/28/2019  7:00 AM   Safety all tubing connections taped;2 rubber-tipped hemostats w/ patient;all connections secured;suction checked 6/28/2019  7:00 AM   Securement tubing secured to body distal to insertion site w/ tape 6/28/2019  7:00 AM   Left Subcutaneous Emphysema none present 6/28/2019  7:00 AM   Right Subcutaneous Emphysema none present 6/28/2019  7:00 AM   Patency Intervention Tip/tilt 6/28/2019  7:00 AM   Drainage Description 1 Serosanguineous 6/28/2019  7:00 AM   Tube 1 Dressing Appearance occlusive gauze dressing intact;clean and dry 6/28/2019  7:00 AM   Tube 1 Dressing Care dressing reinforced 6/28/2019  3:00 AM   Site Assessment 1 Not assessed 6/28/2019  7:00 AM    Surrounding Skin 1 Unable to view 6/28/2019  7:00 AM   Drainage Description 2 Serosanguineous 6/28/2019  7:00 AM   Tube 2 Dressing Appearance occlusive gauze dressing intact;clean and dry 6/28/2019  7:00 AM   Tube 2 Dressing Care dressing reinforced 6/28/2019  3:00 AM   Site Assessment 2 Not assessed 6/28/2019  7:00 AM   Surrounding Skin Unable to view 6/28/2019  7:00 AM   Output (mL) 40 mL 6/28/2019  7:00 AM   Number of days: 10            Y Chest Tube 3 and 4 06/18/19 1151 3 Left Pleural 19 Fr. 4 Left Mediastinal 19 Fr. (Active)   Function -20 cm H2O 6/28/2019  7:00 AM   Air Leak/Fluctuation air leak present 6/28/2019  7:00 AM   Safety all tubing connections taped;2 rubber-tipped hemostats w/ patient;all connections secured;suction checked 6/28/2019  7:00 AM   Securement tubing secured to body distal to insertion site w/ tape 6/28/2019  7:00 AM   Left Subcutaneous Emphysema none present 6/28/2019  7:00 AM   Right Subcutaneous Emphysema none present 6/28/2019  7:00 AM   Patency Intervention Tip/tilt 6/28/2019  7:00 AM   Drainage Description 3 Serosanguineous 6/28/2019  7:00 AM   Tube 3 Dressing Appearance occlusive gauze dressing intact;clean and dry 6/28/2019  7:00 AM   Tube 3 Dressing Care dressing reinforced 6/28/2019  3:00 AM   Site Assessment 3 Not assessed 6/28/2019  7:00 AM   Surrounding Skin 3 Unable to view 6/28/2019  7:00 AM   Drainage Description 4 Serosanguineous 6/28/2019  7:00 AM   Tube 4 Dressing Appearance occlusive gauze dressing intact;clean and dry 6/28/2019  7:00 AM   Tube 4 Dressing Care dressing reinforced 6/28/2019  3:00 AM   Site Assessment 4 Not assessed 6/28/2019  7:00 AM   Surrounding Skin 4 Unable to view 6/28/2019  7:00 AM   Output (mL) 10 mL 6/28/2019  7:00 AM   Number of days: 10       Significant Labs:  CBC:  Recent Labs   Lab 06/28/19  0400   WBC 14.19*   RBC 3.26*   HGB 9.0*   HCT 30.8*      MCV 95   MCH 27.6   MCHC 29.2*     BMP:  Recent Labs   Lab 06/28/19  0400   NA  135*   K 4.4      CO2 22*   BUN 24*   CREATININE 0.8   CALCIUM 8.4*      Tacrolimus Levels:  Recent Labs   Lab 06/28/19  0400   TACROLIMUS 7.5     Microbiology:  Microbiology Results (last 7 days)     Procedure Component Value Units Date/Time    Fungus culture [[id number]]  (Abnormal) Collected:  06/18/19 0930    Order Status:  Completed Specimen:  Tissue from Lung, Left Updated:  06/27/19 1055     Fungus (Mycology) Culture CANDIDA ALBICANS  Moderate      Narrative:       Donor lung    Fungus culture [[id number]]  (Abnormal) Collected:  06/23/19 1735    Order Status:  Completed Specimen:  Respiratory from Bronchial Wash Updated:  06/27/19 1048     Fungus (Mycology) Culture BARNDI GLABRATA  Few      Culture, Respiratory [[id number]]  (Susceptibility) Collected:  06/25/19 0900    Order Status:  Completed Specimen:  Respiratory from Bronchial Wash Updated:  06/27/19 0905     Respiratory Culture No S aureus isolated.      PSEUDOMONAS AERUGINOSA   Few       Gram Stain (Respiratory) <10 epithelial cells per low power field.     Gram Stain (Respiratory) Many WBC's     Gram Stain (Respiratory) No organisms seen    Narrative:       Bronchial Wash    AFB Culture & Smear [[id number]] Collected:  06/25/19 0900    Order Status:  Completed Specimen:  Respiratory from Bronchial Wash Updated:  06/26/19 2127     AFB Culture & Smear Culture in progress     AFB CULTURE STAIN No acid fast bacilli seen.    Narrative:       Bronchial Wash    Fungus culture [[id number]] Collected:  06/25/19 0900    Order Status:  Completed Specimen:  Respiratory from Bronchial Wash Updated:  06/26/19 1102     Fungus (Mycology) Culture Culture in progress    Narrative:       Bronchial Wash    Fungus culture [[id number]] Collected:  06/20/19 0808    Order Status:  Completed Specimen:  Respiratory from Bronchial Wash Updated:  06/26/19 1101     Fungus (Mycology) Culture Culture in progress    Culture, Respiratory with Gram Stain [[id number]]   (Susceptibility) Collected:  06/23/19 1735    Order Status:  Completed Specimen:  Respiratory from Bronchial Wash Updated:  06/26/19 0752     Respiratory Culture No S aureus isolated.      PSEUDOMONAS AERUGINOSA   Few  Normal respiratory anibal also present       Gram Stain (Respiratory) <10 epithelial cells per low power field.     Gram Stain (Respiratory) Few WBC's     Gram Stain (Respiratory) Rare Gram negative rods    AFB Culture & Smear [[id number]] Collected:  06/23/19 1735    Order Status:  Completed Specimen:  Respiratory from Bronchial Wash Updated:  06/24/19 2127     AFB Culture & Smear Culture in progress     AFB CULTURE STAIN No acid fast bacilli seen.    Culture, Anaerobe [[id number]] Collected:  06/18/19 0930    Order Status:  Completed Specimen:  Tissue from Lung, Left Updated:  06/24/19 1107     Anaerobic Culture No anaerobes isolated    Narrative:       Donor lung    Culture, Respiratory with Gram Stain [[id number]]  (Susceptibility) Collected:  06/20/19 0808    Order Status:  Completed Specimen:  Respiratory from Bronchial Wash Updated:  06/22/19 1018     Respiratory Culture No S aureus isolated.      PSEUDOMONAS AERUGINOSA   Rare       Gram Stain (Respiratory) <10 epithelial cells per low power field.     Gram Stain (Respiratory) No WBC's     Gram Stain (Respiratory) No organisms seen    AFB Culture & Smear [[id number]] Collected:  06/20/19 0808    Order Status:  Completed Specimen:  Respiratory from Bronchial Wash Updated:  06/21/19 2127     AFB Culture & Smear Culture in progress     AFB CULTURE STAIN No acid fast bacilli seen.    Fungus culture [[id number]]     Order Status:  Canceled Specimen:  Respiratory from Bronchial Wash     Culture, Respiratory with Gram Stain [[id number]]     Order Status:  Canceled Specimen:  Respiratory from Bronchial Wash     AFB Culture & Smear [[id number]]     Order Status:  Canceled Specimen:  Respiratory from Bronchial Wash           I have reviewed all pertinent  labs within the past 24 hours.    Diagnostic Results:  Chest X-Ray: no new chest imaging

## 2019-06-28 NOTE — ASSESSMENT & PLAN NOTE
POD#6 s/p bilateral lung transplant (re-transplant) for CARLOS EDUARDO. 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 Remains PGD 0. S/p bronch and extubated to HFNC on POD#2. Re-intubated on  CTS to manage chest tubes.   - now extubated to NC and doing well. Continue Xopenex nebs Q6 hrs.  - Continue immunosuppression and ppx per protocol.

## 2019-06-28 NOTE — ANESTHESIA POST-OP PAIN MANAGEMENT
Acute Pain Service Progress Note    Juanita Ibarra is a 29 y.o., female, [id number].    Surgery:  REDO BILATERAL LUNG TRANSPLANT    Post Op Day #: 10    Catheter type: None, bilateral ESPs removed 6/27/19      Problem List:    There are no hospital problems to display for this patient.      Subjective:    No acute events overnight. Pain unchanged from yesterday. Received IM Zyprexa, IV ativan x2 overnight. Patient with hypercapnia on VBG this morning (50s). Plan to discontinue ketamine and reduce dilaudid PCA.    Objective:          Vitals   There were no vitals filed for this visit.     Labs    Admission on 06/17/2019   No results displayed because visit has over 200 results.           Meds   No current facility-administered medications for this visit.      No current outpatient medications on file.     Facility-Administered Medications Ordered in Other Visits   Medication Dose Route Frequency Provider Last Rate Last Dose    acetylcysteine 200 mg/ml (20%) solution 4 mL  4 mL Nebulization Q12H Jake Alvarez MD   4 mL at 06/28/19 1241    bisacodyl suppository 10 mg  10 mg Rectal Daily PRN Shama Canales PA-C   10 mg at 06/28/19 0941    ciprofloxacin (CIPRO)400mg/200ml D5W IVPB 400 mg  400 mg Intravenous Q8H Jacqueline Dumont PA-C 200 mL/hr at 06/28/19 1317 400 mg at 06/28/19 1317    dexmedetomidine (PRECEDEX) 400mcg/100mL 0.9% NaCL infusion  0.2 mcg/kg/hr Intravenous Continuous Shama Canales PA-C 16.1 mL/hr at 06/28/19 1300 1 mcg/kg/hr at 06/28/19 1300    dextrose 10% (D10W) Bolus  12.5 g Intravenous PRN Jonathan Newby MD 1,000 mL/hr at 06/19/19 0232 125 mL at 06/19/19 0232    dextrose 10% (D10W) Bolus  25 g Intravenous PRN Jonathan Newby MD        dextrose 5 % and 0.45 % NaCl with KCl 20 mEq infusion   Intravenous Continuous Jacqueline Douglass MD 5 mL/hr at 06/28/19 1300      dextrose 50% injection 12.5 g  12.5 g Intravenous PRN Amy M. Cobar, NP        dextrose 50%  injection 25 g  25 g Intravenous PRN Amy Zapata NP        diphenhydrAMINE injection 50 mg  50 mg Intravenous On Call Procedure Francisca LAditi Morin, DO   50 mg at 06/28/19 0324    enoxaparin injection 40 mg  40 mg Subcutaneous Daily Jacqueline Dumont PA-C   40 mg at 06/27/19 1746    famotidine (PF) injection 20 mg  20 mg Intravenous BID Jacqueline Dumont PA-C   20 mg at 06/28/19 0850    fluconazole (DIFLUCAN) IVPB 400 mg 200 mL  400 mg Intravenous Q24H Shama Canales PA-C 100 mL/hr at 06/28/19 0815 400 mg at 06/28/19 0815    ganciclovir (CYTOVENE) 295 mg in sodium chloride 0.9% 100 mL IVPB  5 mg/kg Intravenous Q12H Jacqueline Douglass  mL/hr at 06/28/19 1000 295 mg at 06/28/19 1000    glucagon (human recombinant) injection 1 mg  1 mg Intramuscular PRN Amy Zapata NP        glucose chewable tablet 16 g  16 g Oral PRN Amy Zapata NP        glucose chewable tablet 24 g  24 g Oral PRN Amy Zapata NP        glycerin 99.5% topical solution 100 mL  100 mL Rectal ED 1 Time Reyes Will NP        And    magnesium citrate solution 296 mL  296 mL Rectal ED 1 Time Reyes Will NP        And    sodium chloride 0.9% bolus 500 mL  500 mL Rectal ED 1 Time Reyes Will NP        hydromorphone (PF) injection 2 mg  2 mg Intravenous Q1H PRN Jhoan Antunez MD   2 mg at 06/26/19 1201    HYDROmorphone PCA 0.5 mg/mL in 30mL 0.9% Sodium Chloride in a 35mL MONOject Detach Syringe   Intravenous Continuous Farhan H MD Karen        insulin aspart U-100 pen 0-10 Units  0-10 Units Subcutaneous PRN Deny Swenson NP        insulin regular (Humulin R) 100 Units in sodium chloride 0.9% 100 mL infusion  0.8 Units/hr Intravenous Continuous Deny Swenson NP 0.8 mL/hr at 06/26/19 1900 0.8 Units/hr at 06/26/19 1900    lactulose 20 gram/30 mL solution Soln 20 g  20 g Oral Q6H PRN Angelica Gomez MD        levalbuterol nebulizer solution 1.25 mg  1.25 mg Nebulization Q4H PRN Jacqueline  DENNISE Dumont PA-C   1.25 mg at 06/27/19 1556    levalbuterol nebulizer solution 1.25 mg  1.25 mg Nebulization Q6H WAKE Jacqueline Dumont PA-C   1.25 mg at 06/28/19 1227    lidocaine 5 % patch 1 patch  1 patch Transdermal Q24H Farhan H MD Karen   1 patch at 06/28/19 1215    lidocaine 5 % patch 1 patch  1 patch Transdermal Q24H Farhan H MD Karen   1 patch at 06/28/19 1215    lipase-protease-amylase (VIOKACE) 20,880-78,300- 78,300 units per tablet 1 tablet  1 tablet Oral Q3H PRN Francisca Morin DO   1 tablet at 06/24/19 1700    lorazepam (ATIVAN) injection 2 mg  2 mg Intravenous Q4H PRN Jacqueline Dumont PA-C   2 mg at 06/28/19 1213    magnesium sulfate 2g in water 50mL IVPB (premix)  2 g Intravenous PRN Angelica Gomez MD   2 g at 06/28/19 0552    methocarbamol 1000 mg in dextrose 5 % 100 mL IVPB (ready to mix system)  1,000 mg Intravenous Q8H Twan Alvarez MD   1,000 mg at 06/28/19 1317    methylPREDNISolone sodium succinate injection 20 mg  20 mg Intravenous Daily Jake Alvarez MD   20 mg at 06/28/19 0850    metoclopramide HCl injection 5 mg  5 mg Intravenous Q6H PRN Jacqueline Douglass MD        mycophenolate (CELLCEPT) 500 mg in dextrose 5 % 100 mL IVPB  500 mg Intravenous BID Shama Canales PA-C 50 mL/hr at 06/28/19 0815 500 mg at 06/28/19 0815    naloxone 0.4 mg/mL injection 0.02 mg  0.02 mg Intravenous PRN Riley Pennington MD        niCARdipine 40 mg/200 mL infusion  1 mg/hr Intravenous Continuous Harleen Riley MD   Stopped at 06/24/19 0200    OLANZapine injection 10 mg  10 mg Intramuscular BID PRN Jacqueline Dumont PA-C   10 mg at 06/28/19 0339    ondansetron disintegrating tablet 8 mg  8 mg Oral Q8H PRN Jacqueline Douglass MD        ondansetron injection 4 mg  4 mg Intravenous Q6H PRN Shama Canales PA-C   4 mg at 06/27/19 1552    oxyCODONE immediate release tablet 10 mg  10 mg Oral Q4H PRN Jacqueline Douglass MD        oxyCODONE  immediate release tablet 5 mg  5 mg Oral Q4H PRN Jacqueline Douglass MD        polyethylene glycol packet 17 g  17 g Per NG tube BID Jacqueline Dumont PA-C   17 g at 06/25/19 2104    potassium chloride 20 mEq in 100 mL IVPB (FOR CENTRAL LINE ADMINISTRATION ONLY)  20 mEq Intravenous PRN Jacqueline Douglass MD 50 mL/hr at 06/22/19 0647 20 mEq at 06/22/19 0647    And    potassium chloride 40 mEq in 100 mL IVPB (FOR CENTRAL LINE ADMINISTRATION ONLY)  40 mEq Intravenous PRN Jacqueline Douglass MD 25 mL/hr at 06/18/19 1749 40 mEq at 06/18/19 1749    And    potassium chloride 20 mEq in 100 mL IVPB (FOR CENTRAL LINE ADMINISTRATION ONLY)  60 mEq Intravenous PRN Jacqueline Douglass MD        promethazine (PHENERGAN) 6.25 mg in dextrose 5 % 50 mL IVPB  6.25 mg Intravenous Q6H PRN Chai Glass MD        sodium chloride 0.9% flush 10 mL  10 mL Intravenous Q6H Jake Alvarez MD   10 mL at 06/28/19 1200    And    sodium chloride 0.9% flush 10 mL  10 mL Intravenous PRN Jake Alvarez MD        sodium chloride 7% nebulizer solution 4 mL  4 mL Nebulization Q12H Jake Alvarez MD   4 mL at 06/28/19 1232    tacrolimus capsule 2 mg  2 mg Sublingual BID Jake Alvarez MD   2 mg at 06/28/19 0850    vancomycin 750 mg in dextrose 5 % 250 mL IVPB (ready to mix system)  750 mg Intravenous Q12H Francisca Morin .7 mL/hr at 06/28/19 0342 750 mg at 06/28/19 0342        Anticoagulant dose: none.    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 Patient with significant opioid requirement prior to surgery from previous lung transplant. Remained intubated s/p lung transplant, requiring fentanyl with dexmedetomidine at 1.4mcg/kg/hr and propofol 50mcg/kg/min for sedation. S/P bilateral TARA catheter placement 06/19/19 w/ hand boluses Q12H at 0700 and 1900. Patient placed on  ketamine infusion on 6/20 at 10 mcg/kg/min (titrated up to 25 mcg/kg/min), extubated on 6/21 but re-intubated for respiratory distress, agitation, delirium. Ketamine discontinued. Restarted low dose non-titrating 5 mcg/kg/min on 6/24/19. Extubated 6/26/19 to HFNC. Bilateral TARA catheters pulled on 6/27/19. MBSS failed 6/28. Ketamine stopped 6/28/19.    - Ketamine stopped 6/28/19  - Dilaudid PCA reduced to 0.3mg basal, 0.2 mg q6min, 2.3mg/hr max  - Defer treatment of anxiety to primary team        Evaluator Farhan Jones

## 2019-06-28 NOTE — PT/OT/SLP PROGRESS
Speech Language Pathology Treatment    Patient Name:  Juanita Ibarra   MRN:  [id number]  Admitting Diagnosis: S/P lung transplant    Recommendations:                 General Recommendations:  Dysphagia therapy  Diet recommendations:  NPO, Liquid Diet Level: NPO   Aspiration Precautions: Strict aspiration precautions   General Precautions: Standard, fall  Communication strategies:  none    Subjective     Patient asleep. Spouse present.     Pain/Comfort:  · Pain Rating 1: 0/10    Objective:     Has the patient been evaluated by SLP for swallowing?   Yes  Keep patient NPO? Yes   Current Respiratory Status: room air      Upon ST entry to room, patient asleep though with notable increased work of breathing (shoulder shrugging, abdominal contractions). Spouse present at the bedside. ST provided skilled education regarding previous days MBSS, goals and objectives for speech therapy, and ongoing assessment to determine readiness for repeat MBSS. Spouse verbalized agreement with plan of care. All questions and concerns addressed to best of ST ability.     Assessment:     Juanita Ibarra is a 29 y.o. female with an SLP diagnosis of Dysphagia.      Goals:   Multidisciplinary Problems     SLP Goals        Problem: SLP Goal    Goal Priority Disciplines Outcome   SLP Goal     SLP    Description:  Speech Language Pathology Goals  Goals expected to be met by 7/4:  1. Patient will participate in ongoing swallow assessment.   2. When determined by SLP, patient may benefit from a repeat modified barium swallow study to further objectively assess swallow function/safety.                         Plan:     · Patient to be seen:  3 x/week   · Plan of Care expires:  07/27/19  · Plan of Care reviewed with:  patient, spouse   · SLP Follow-Up:  Yes       Discharge recommendations:  other (see comments)   Barriers to Discharge:  None    Time Tracking:     SLP Treatment Date:   06/28/19  Speech Start Time:  1025  Speech Stop  Time:  1035     Speech Total Time (min):  10 min    Billable Minutes: Seld Care/Home Management Training 10    Emily Abadie, CCC-SLP  06/28/2019

## 2019-06-28 NOTE — PT/OT/SLP PROGRESS
Occupational Therapy      Patient Name:  Juanita Ibarra   MRN:  [id number]    Patient not seen today secondary to pt with SOB and visible shallow breathing at rest  . Will follow-up at a later date.    LUCIA Foy  6/28/2019

## 2019-06-28 NOTE — PROGRESS NOTES
"[facility]  Endocrinology  Progress Note    Admit Date: 6/17/2019     Reason for Consult: Management of  Hyperglycemia and CF related pancreatic insufficiency.     Surgical Procedure and Date: lung transplant in 2014; 6/18/19    HPI:   Patient is a 29 y.o. female with a diagnosis of CF, HTN, migraine headache, and osteopenia. 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 No personal history of DM. Endocrinology consulted for BG management.     Lab Results   Component Value Date    HGBA1C 4.7 01/09/2019                 Interval HPI:   Overnight events: Remains in ICU, NAEON. BG elevated during the day but well controlled overnight on transition IV insulin infusion at 0.8 u/hr with correction scale.  Solu medrol 20 mg IV daily; steroid taper per primary.  On IV antibiotics.  Eating:   NPO  Nausea: No  Hypoglycemia and intervention: No  Fever: No  TPN and/or TF: No    /85 (BP Location: Right arm, Patient Position: Lying)   Pulse (!) 136   Temp 99.1 °F (37.3 °C) (Oral)   Resp (!) 31   Ht 5' 1" (1.549 m)   Wt 63 kg (138 lb 14.2 oz)   LMP 10/02/2016   SpO2 (!) 90%   Breastfeeding? No   BMI 26.24 kg/m²      Labs Reviewed and Include    Recent Labs   Lab 06/28/19  0400   *   CALCIUM 8.4*   ALBUMIN 2.5*   PROT 6.1   *   K 4.4   CO2 22*      BUN 24*   CREATININE 0.8   ALKPHOS 219*   *   AST 47*   BILITOT 0.4     Lab Results   Component Value Date    WBC 14.19 (H) 06/28/2019    HGB 9.0 (L) 06/28/2019    HCT 30.8 (L) 06/28/2019    MCV 95 06/28/2019     06/28/2019     No results for input(s): TSH, FREET4 in the last 168 hours.  Lab Results   Component Value Date    HGBA1C 4.7 01/09/2019       Nutritional status:   Body mass index is 26.24 kg/m².  Lab Results   Component Value Date    ALBUMIN 2.5 (L) 06/28/2019    ALBUMIN 2.6 (L) 06/27/2019    ALBUMIN 2.4 (L) 06/26/2019     Lab Results   Component Value Date    " PREALBUMIN 15 (L) 05/29/2014    PREALBUMIN 11 (L) 05/09/2014    PREALBUMIN 18 (L) 03/19/2014       Estimated Creatinine Clearance: 87.5 mL/min (based on SCr of 0.8 mg/dL).    Accu-Checks  Recent Labs     06/26/19  1938 06/26/19  2315 06/27/19  0404 06/27/19  0820 06/27/19  1226 06/27/19  1619 06/27/19 2009 06/28/19  0029 06/28/19  0400 06/28/19  0810   POCTGLUCOSE 115* 229* 168* 144* 235* 263* 247* 148* 125* 175*       Current Medications and/or Treatments Impacting Glycemic Control  Immunotherapy:    Immunosuppressants         Stop Route Frequency     tacrolimus capsule 2 mg      -- SL 2 times daily     mycophenolate (CELLCEPT) 500 mg in dextrose 5 % 100 mL IVPB      -- IV 2 times daily        Steroids:   Hormones (From admission, onward)    Start     Stop Route Frequency Ordered    06/27/19 0945  methylPREDNISolone sodium succinate injection 20 mg      -- IV Daily 06/27/19 0831    06/26/19 1827  dexAMETHasone sodium phos (PF) 10 mg/mL injection     Note to Pharmacy:  Created by cabinet override    06/27 0629   06/26/19 1827        Pressors:    Autonomic Drugs (From admission, onward)    None        Hyperglycemia/Diabetes Medications:   Antihyperglycemics (From admission, onward)    Start     Stop Route Frequency Ordered    06/20/19 1545  insulin regular (Humulin R) 100 Units in sodium chloride 0.9% 100 mL infusion      -- IV Continuous 06/20/19 1442    06/20/19 1542  insulin aspart U-100 pen 0-4 Units      -- SubQ As needed (PRN) 06/20/19 1442          ASSESSMENT and PLAN    * S/P lung transplant  Managed per LUT.   avoid hypoglycemia        Hyperglycemia  BG goal 140 - 180.     Transition IV insulin infusion at 0.8 u/hr  Low dose correction scale  BG monitoring every 4 hours while NPO    ADDENDUM: Change correction scale to moderate dose due to persistent daytime hyperglycemia    Discharge planning: TBD    CF related Pancreatic insufficiency  May impact BG.       Adrenal cortical steroids causing adverse effect  in therapeutic use  Glucocorticoids markedly increase blood glucose. Expect the steroid taper will help glucose control.         Prophylactic immunotherapy  May increase insulin resistance.           Deny Swenson NP  Endocrinology  [facility]

## 2019-06-29 NOTE — NURSING
After discussing directly with Dr Alvarez, Dr. Douglass placed R Pleural CT without complication. Pt tolerated well. Placed to -20 cm wall suction. Awaiting CXR

## 2019-06-29 NOTE — PLAN OF CARE
Problem: Adult Inpatient Plan of Care  Goal: Plan of Care Review  Outcome: Ongoing (interventions implemented as appropriate)  Patient currently resting comfortably in bed on 2L nasal cannula. HR , SBP<160. Patient voiding spontaneously per bedside commode/bedpan without issue. Minimal serosanguinous output from left-sided chest tubes, right-sided chest tubes DC'd at beginning of shift. Precedex infusing at 1.4mcg/kg/hr - 2mg ativan IVP given Q4H PRN for anxiety. Pain moderately controlled with PCA dilaudid - use of pump encouraged. Plan to continue to use multimodal methods to control pain/anxiety and encourage mobility. Patient and significant other updated regarding plan of care - questions answered. Will continue to monitor.

## 2019-06-29 NOTE — CONSULTS
"Consult received for "tube feed recs"    Pt seen yesterday by RD, follow up to be completed on 7/2.    Per RD note, TF recs : Recommend initiating TF of Impact Peptide 1.5 at a goal rate of 45 mL/hr - to provide 1620 kcal/day, 102g protein/day, and 812mL free fluid/day. To meet 100% of EEN and EPN of pt needs.     RD to follow up and monitor pt.     Thanks,  Torri Bearden RD     "

## 2019-06-29 NOTE — ASSESSMENT & PLAN NOTE
POD#7 s/p bilateral lung transplant (re-transplant) for CARLOS EDUARDO. 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 Remains PGD 0. S/p bronch and extubated to HFNC on POD#2. Re-intubated on  CTS to manage chest tubes.   - now extubated to NC and doing well. Continue Xopenex nebs Q6 hrs.  - Continue immunosuppression and ppx per protocol.   -Continue PT/OT/IS. Encourage coughing to clear secretions

## 2019-06-29 NOTE — SUBJECTIVE & OBJECTIVE
"Interval HPI:   Overnight events: Remains in ICU, NAEON. BG elevated during the day but well controlled overnight on transition IV insulin infusion at 0.8 u/hr plus correction scale.  Solu medrol 20 mg IV daily; steroid taper per primary.  On IV antibiotics.  May start TF today per primary team.  Eating:   NPO  Nausea: No  Hypoglycemia and intervention: No  Fever: No  TPN and/or TF: No    BP (!) 86/60 (BP Location: Left arm, Patient Position: Lying)   Pulse 87   Temp 98.5 °F (36.9 °C) (Oral)   Resp 20   Ht 5' 1" (1.549 m)   Wt 64.2 kg (141 lb 8.6 oz)   LMP 10/02/2016   SpO2 99%   Breastfeeding? No   BMI 26.74 kg/m²     Labs Reviewed and Include    Recent Labs   Lab 06/29/19  0359   *   CALCIUM 8.0*   ALBUMIN 2.2*   PROT 5.2*   *   K 4.6   CO2 20*      BUN 20   CREATININE 0.8   ALKPHOS 225*   *   AST 53*   BILITOT 0.5     Lab Results   Component Value Date    WBC 16.81 (H) 06/29/2019    HGB 8.3 (L) 06/29/2019    HCT 26.9 (L) 06/29/2019    MCV 89 06/29/2019     (L) 06/29/2019     No results for input(s): TSH, FREET4 in the last 168 hours.  Lab Results   Component Value Date    HGBA1C 4.7 01/09/2019       Nutritional status:   Body mass index is 26.74 kg/m².  Lab Results   Component Value Date    ALBUMIN 2.2 (L) 06/29/2019    ALBUMIN 2.5 (L) 06/28/2019    ALBUMIN 2.6 (L) 06/27/2019     Lab Results   Component Value Date    PREALBUMIN 15 (L) 05/29/2014    PREALBUMIN 11 (L) 05/09/2014    PREALBUMIN 18 (L) 03/19/2014       Estimated Creatinine Clearance: 88.3 mL/min (based on SCr of 0.8 mg/dL).    Accu-Checks  Recent Labs     06/27/19  1619 06/27/19  2009 06/28/19  0029 06/28/19  0400 06/28/19  0810 06/28/19  1223 06/28/19  1555 06/28/19  1958 06/29/19  0018 06/29/19  0419   POCTGLUCOSE 263* 247* 148* 125* 175* 249* 329* 212* 124* 210*       Current Medications and/or Treatments Impacting Glycemic Control  Immunotherapy:    Immunosuppressants         Stop Route Frequency     " tacrolimus capsule 2 mg      -- SL 2 times daily     mycophenolate (CELLCEPT) 500 mg in dextrose 5 % 100 mL IVPB      -- IV 2 times daily        Steroids:   Hormones (From admission, onward)    Start     Stop Route Frequency Ordered    06/27/19 0945  methylPREDNISolone sodium succinate injection 20 mg      -- IV Daily 06/27/19 0831    06/26/19 1827  dexAMETHasone sodium phos (PF) 10 mg/mL injection     Note to Pharmacy:  Created by cabinet override    06/27 0629 06/26/19 1827        Pressors:    Autonomic Drugs (From admission, onward)    None        Hyperglycemia/Diabetes Medications:   Antihyperglycemics (From admission, onward)    Start     Stop Route Frequency Ordered    06/28/19 1502  insulin aspart U-100 pen 0-10 Units      -- SubQ As needed (PRN) 06/28/19 1403    06/20/19 1545  insulin regular (Humulin R) 100 Units in sodium chloride 0.9% 100 mL infusion      -- IV Continuous 06/20/19 2149

## 2019-06-29 NOTE — NURSING
AAO. FC. VALENCIA. Up to chair with maximal assist. -150 about 1400. 5 mg Metoprolol IV administered with good response. HR now 120s. BP WDL. Sats upper 90s on 2 L NC. Loose productive cough. Lungs coarse throughout. Bilateral pneumothorax. Dr. Alvarez reviewed CXRs. No new interventions regarding this. L CT to -20cm suction with air leak resolved this shift. Scant output. TCDB and IS encouraged. Pt refusing thus far this shift for NGT to be replaced despite MD and RN discussing importance of nutrition. Void per bsc. Multi modality pain control. See MAR for details. Call light in reach. Demonstrates proper use. Significant other at bedside. Updated on plan of care by MD team.

## 2019-06-29 NOTE — ASSESSMENT & PLAN NOTE
- Anesthesia consulted for pain mgmt. Continue aggressive bowel regimen.   - PCA dilaudid ordered. Will add and titrate meds as needed  - VBG PRN to watch for development of hypercapnia

## 2019-06-29 NOTE — ASSESSMENT & PLAN NOTE
BG goal 140 - 180.     Transition IV insulin infusion at 0.8 u/hr  Moderate dose correction scale  BG monitoring every 4 hours while NPO    Discharge planning: SAURABH

## 2019-06-29 NOTE — NURSING
Attempted to replace NGT that was inadvertently pulled out by patient overnight. Pt refused despite explanations of needing NGT for nutrition and some medications.

## 2019-06-29 NOTE — PROGRESS NOTES
[facility]  Lung Transplant  Progress Note - Critical Care    Patient Name: Juanita Ibarra  MRN: [id number]  Admission Date: 6/17/2019  Hospital Length of Stay: 11 days  Post-Operative Day: 1843 (Lung), 11 (Lung)  Attending Physician: Jake Alvarez MD  Primary Care Provider: Primary Doctor No     Subjective:     Interval History: No acute events overnight.  Patient slept well last night.  NG tube was removed by patient last night.    Continuous Infusions:   dexmedetomidine (PRECEDEX) infusion 0.06 mcg/kg/hr (06/29/19 1200)    dextrose 5 % and 0.45 % NaCl with KCl 20 mEq 5 mL/hr at 06/29/19 1200    HYDROmorphone PCA 0.5 mg/mL in 30mL 0.9% Sodium Chloride in a 35mL MONOject Detach Syringe      insulin (HUMAN R) infusion (adults) 0.8 Units/hr (06/26/19 1900)    nicardipine Stopped (06/24/19 0200)     Scheduled Meds:   acetylcysteine 200 mg/ml (20%)  4 mL Nebulization Q12H    ciprofloxacin  400 mg Intravenous Q8H    enoxaparin  40 mg Subcutaneous Daily    famotidine (PF)  20 mg Intravenous BID    fluconazole (DIFLUCAN) IVPB  400 mg Intravenous Q24H    ganciclovir (CYTOVENE) IVPB  5 mg/kg Intravenous Q12H    levalbuterol  1.25 mg Nebulization Q6H WAKE    lidocaine  1 patch Transdermal Q24H    lidocaine  1 patch Transdermal Q24H    methylPREDNISolone sodium succinate  20 mg Intravenous Daily    mycophenolate (CELLCEPT) IVPB  500 mg Intravenous BID    polyethylene glycol  17 g Per NG tube BID    sodium chloride 0.9%  10 mL Intravenous Q6H    sodium chloride 7%  4 mL Nebulization Q12H    tacrolimus  2 mg Sublingual BID    vancomycin (VANCOCIN) IVPB  750 mg Intravenous Q12H     PRN Meds:bisacodyl, Dextrose 10% Bolus, Dextrose 10% Bolus, dextrose 50%, dextrose 50%, diphenhydrAMINE, glucagon (human recombinant), glucose, glucose, HYDROmorphone, insulin aspart U-100, lactulose, levalbuterol, lipase-protease-amylase (VIOKACE) 20,880-78,300- 78,300 units, lorazepam, magnesium  sulfate IVPB, metoclopramide HCl, naloxone, OLANZapine, ondansetron, ondansetron, oxyCODONE, oxyCODONE, potassium chloride in water **AND** potassium chloride in water **AND** potassium chloride in water, promethazine (PHENERGAN) IVPB, Flushing PICC Protocol **AND** sodium chloride 0.9% **AND** sodium chloride 0.9%    Review of patient's allergies indicates:   Allergen Reactions    Albuterol Palpitations    Colistin Anaphylaxis    Vancomycin analogues      Infusion reaction that does not resolve with slowing  Pt. States she can tolerate it when given with 50 mg Benadryl and ran over 3 hours    Neupogen [filgrastim] Other (See Comments)     Ostealgia after five daily doses of 300 mcg.      Bactrim [sulfamethoxazole-trimethoprim] Hives    Ceftazidime Hives     Pt stated can tolerate cefapine not ceftazidime    Ceftazidime     Dronabinol Other (See Comments)     Mental changes/hallucinations    Haldol [haloperidol lactate] Other (See Comments)     Seizure like activity    Nsaids (non-steroidal anti-inflammatory drug)      Cannot have due to lung transplant    Adhesive Rash     Cloth tape- please use tegaderm or paper tape    Aztreonam Rash    Ciprofloxacin Nausea And Vomiting     Projectile N/V, per patient.  Unwilling to retry therapy.       Review of Systems   Constitutional: Positive for fatigue. Negative for appetite change, chills, diaphoresis and fever.   HENT: Negative for congestion, postnasal drip, rhinorrhea, sinus pressure and sinus pain.    Respiratory: Positive for cough, chest tightness and shortness of breath. Negative for wheezing.    Cardiovascular: Positive for chest pain (insertion site). Negative for palpitations and leg swelling.   Gastrointestinal: Negative for abdominal distention, diarrhea, nausea and vomiting.   Genitourinary: Negative for dysuria.   Skin: Negative for rash and wound.   Neurological: Positive for weakness. Negative for dizziness, syncope and headaches.    Psychiatric/Behavioral: The patient is nervous/anxious.      Objective:   Physical Exam   Constitutional: She appears well-developed and well-nourished. She is easily aroused. No distress. Nasal cannula in place.   HENT:   Head: Normocephalic and atraumatic.   Mouth/Throat: Oropharynx is clear and moist. No oropharyngeal exudate.   Eyes: Conjunctivae are normal. No scleral icterus.   Neck: No JVD present.   Right IJ CVC, CDI   Cardiovascular: Regular rhythm. Tachycardia present. Exam reveals no friction rub.   No murmur heard.  Pulmonary/Chest: No accessory muscle usage. No respiratory distress. She has no decreased breath sounds. She has no wheezes. She has rhonchi (scattered). She has no rales.   Bilateral pleural chest tubes in place   Abdominal: Soft. Bowel sounds are normal. She exhibits no distension. There is no tenderness.   Musculoskeletal: She exhibits no edema.   Neurological: She is alert and easily aroused. No cranial nerve deficit.   Skin: Skin is warm and dry. No rash noted. She is not diaphoretic. No erythema.   Psychiatric: Her mood appears anxious.   Nursing note and vitals reviewed.        Vital Signs (Most Recent):  Temp: 98 °F (36.7 °C) (06/29/19 1100)  Pulse: (!) 144 (06/29/19 1200)  Resp: (!) 22 (06/29/19 1200)  BP: (!) 130/93 (06/29/19 1200)  SpO2: 99 % (06/29/19 1200) Vital Signs (24h Range):  Temp:  [98 °F (36.7 °C)-99.3 °F (37.4 °C)] 98 °F (36.7 °C)  Pulse:  [] 144  Resp:  [17-49] 22  SpO2:  [87 %-100 %] 99 %  BP: ()/() 130/93     Weight: 64.2 kg (141 lb 8.6 oz)  Body mass index is 26.74 kg/m².      Intake/Output Summary (Last 24 hours) at 6/29/2019 1244  Last data filed at 6/29/2019 0900  Gross per 24 hour   Intake 2695.68 ml   Output 2050 ml   Net 645.68 ml       Ventilator Data:          Hemodynamic Parameters:       Lines/Drains:       Introducer right internal jugular (Active)   Specific Qualities Capped 6/29/2019 11:04 AM   Dressing Status Clean;Dry;Biopatch in  place;Intact 6/29/2019 11:04 AM   Dressing Intervention Dressing reinforced 6/29/2019  3:00 AM   Dressing Change Due 06/29/19 6/29/2019  3:00 AM   Daily Line Review Performed 6/29/2019 11:04 AM   Number of days:             Port A Cath Single Lumen 06/24/14 1200 right subclavian (Active)   Accessed by: Radha Lanza 6/24/2019  5:00 AM   Dressing Type Transparent 6/29/2019 11:04 AM   Dressing Status Biopatch in place;Clean;Dry;Intact 6/29/2019 11:04 AM   Dressing Intervention Dressing reinforced 6/29/2019  3:00 AM   Dressing Change Due 06/30/19 6/29/2019  3:00 AM   Line Status Infusing 6/29/2019 11:04 AM   Flush Performed Yes 6/29/2019 11:04 AM   Date to be Reflushed 06/18/19 6/29/2019  3:00 AM   Daily Line Review Performed 6/29/2019 11:04 AM   Type of Needle Nicole 6/29/2019  3:00 AM   Gauge 20 6/29/2019  3:00 AM   Needle Length 1 in 6/26/2019  3:00 AM   Needle Status Left in place 6/29/2019  3:00 AM   Needle Insertion Date 06/24/19 6/26/2019  3:00 AM   Needle Insertion Time 0500 6/26/2019  3:00 AM   Number of days: 1831            Percutaneous Central Line Insertion/Assessment - double lumen  06/18/19 0510 (Active)   Dressing biopatch in place;dressing dry and intact 6/29/2019 11:04 AM   Securement secured w/ sutures 6/29/2019 11:04 AM   Additional Site Signs no erythema;no warmth 6/29/2019 11:04 AM   Distal Patency/Care infusing 6/29/2019 11:04 AM   Proximal Patency/Care infusing 6/29/2019 11:04 AM   Waveform normal 6/29/2019 11:04 AM   Line Interventions line leveled/zeroed 6/29/2019 11:04 AM   Dressing Change Due 06/29/19 6/29/2019  3:00 AM   Daily Line Review Performed 6/29/2019 11:04 AM   Number of days: 11            Peripheral IV - Single Lumen 06/27/19 0917 22 G Right Forearm (Active)   Site Assessment Clean;Dry;Intact;No redness;No swelling 6/29/2019 11:04 AM   Line Status Infusing 6/29/2019 11:04 AM   Dressing Status Clean;Dry;Intact 6/29/2019 11:04 AM   Dressing Intervention Dressing reinforced 6/29/2019   3:00 AM   Dressing Change Due 07/01/19 6/29/2019  3:00 AM   Site Change Due 07/01/19 6/28/2019  7:31 PM   Reason Not Rotated Not due 6/29/2019 11:04 AM   Number of days: 2            Y Chest Tube 3 and 4 06/18/19 1151 3 Left Pleural 19 Fr. 4 Left Mediastinal 19 Fr. (Active)   Function -20 cm H2O 6/29/2019 11:04 AM   Air Leak/Fluctuation air leak present 6/29/2019 11:04 AM   Safety all tubing connections taped;2 rubber-tipped hemostats w/ patient;all connections secured;suction checked 6/29/2019 11:04 AM   Securement tubing secured to body distal to insertion site w/ tape 6/29/2019 11:04 AM   Left Subcutaneous Emphysema none present 6/29/2019 11:04 AM   Right Subcutaneous Emphysema none present 6/29/2019 11:04 AM   Patency Intervention Tip/tilt 6/29/2019 11:04 AM   Drainage Description 3 Serosanguineous 6/29/2019 11:04 AM   Tube 3 Dressing Appearance occlusive gauze dressing intact 6/29/2019 11:04 AM   Tube 3 Dressing Care dressing reinforced 6/29/2019  3:00 AM   Site Assessment 3 Not assessed 6/29/2019 11:04 AM   Surrounding Skin 3 Dry;Intact 6/29/2019 11:04 AM   Drainage Description 4 Serosanguineous 6/29/2019 11:04 AM   Tube 4 Dressing Appearance occlusive gauze dressing intact 6/29/2019 11:04 AM   Tube 4 Dressing Care dressing reinforced 6/29/2019  3:00 AM   Site Assessment 4 Not assessed 6/29/2019 11:04 AM   Surrounding Skin 4 Dry;Intact 6/29/2019 11:04 AM   Output (mL) 20 mL 6/29/2019  3:00 AM   Number of days: 11       Significant Labs:  CBC:  Recent Labs   Lab 06/29/19 0359   WBC 16.81*   RBC 3.03*   HGB 8.3*   HCT 26.9*   *   MCV 89   MCH 27.4   MCHC 30.9*     BMP:  Recent Labs   Lab 06/29/19 0359   *   K 4.6      CO2 20*   BUN 20   CREATININE 0.8   CALCIUM 8.0*      Tacrolimus Levels:  Recent Labs   Lab 06/29/19 0359   TACROLIMUS 10.0     Microbiology:  Microbiology Results (last 7 days)     Procedure Component Value Units Date/Time    Fungus culture [[id number]]  (Abnormal) Collected:   06/18/19 0930    Order Status:  Completed Specimen:  Tissue from Lung, Left Updated:  06/27/19 1055     Fungus (Mycology) Culture CANDIDA ALBICANS  Moderate      Narrative:       Donor lung    Fungus culture [[id number]]  (Abnormal) Collected:  06/23/19 1735    Order Status:  Completed Specimen:  Respiratory from Bronchial Wash Updated:  06/27/19 1048     Fungus (Mycology) Culture BRANDI GLABRATA  Few      Culture, Respiratory [[id number]]  (Susceptibility) Collected:  06/25/19 0900    Order Status:  Completed Specimen:  Respiratory from Bronchial Wash Updated:  06/27/19 0905     Respiratory Culture No S aureus isolated.      PSEUDOMONAS AERUGINOSA   Few       Gram Stain (Respiratory) <10 epithelial cells per low power field.     Gram Stain (Respiratory) Many WBC's     Gram Stain (Respiratory) No organisms seen    Narrative:       Bronchial Wash    AFB Culture & Smear [[id number]] Collected:  06/25/19 0900    Order Status:  Completed Specimen:  Respiratory from Bronchial Wash Updated:  06/26/19 2127     AFB Culture & Smear Culture in progress     AFB CULTURE STAIN No acid fast bacilli seen.    Narrative:       Bronchial Wash    Fungus culture [[id number]] Collected:  06/25/19 0900    Order Status:  Completed Specimen:  Respiratory from Bronchial Wash Updated:  06/26/19 1102     Fungus (Mycology) Culture Culture in progress    Narrative:       Bronchial Wash    Fungus culture [[id number]] Collected:  06/20/19 0808    Order Status:  Completed Specimen:  Respiratory from Bronchial Wash Updated:  06/26/19 1101     Fungus (Mycology) Culture Culture in progress    Culture, Respiratory with Gram Stain [[id number]]  (Susceptibility) Collected:  06/23/19 1735    Order Status:  Completed Specimen:  Respiratory from Bronchial Wash Updated:  06/26/19 0752     Respiratory Culture No S aureus isolated.      PSEUDOMONAS AERUGINOSA   Few  Normal respiratory anibal also present       Gram Stain (Respiratory) <10 epithelial cells  per low power field.     Gram Stain (Respiratory) Few WBC's     Gram Stain (Respiratory) Rare Gram negative rods    AFB Culture & Smear [[id number]] Collected:  06/23/19 1735    Order Status:  Completed Specimen:  Respiratory from Bronchial Wash Updated:  06/24/19 2127     AFB Culture & Smear Culture in progress     AFB CULTURE STAIN No acid fast bacilli seen.    Culture, Anaerobe [[id number]] Collected:  06/18/19 0930    Order Status:  Completed Specimen:  Tissue from Lung, Left Updated:  06/24/19 1107     Anaerobic Culture No anaerobes isolated    Narrative:       Donor lung          I have reviewed all pertinent labs within the past 24 hours.    Diagnostic Results:  Chest X-Ray: Pulmonary vascularity is mildly increased, but stable.  As previously, there is bilateral scattered alveolar opacification, which could be due to infection or edema.  Bilateral pneumothoraces remain,           Assessment/Plan:     * S/P lung transplant  POD#7 s/p bilateral lung transplant (re-transplant) for CARLOS EDUARDO. 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 Remains PGD 0. S/p bronch and extubated to HFNC on POD#2. Re-intubated on  CTS to manage chest tubes.   - now extubated to NC and doing well. Continue Xopenex nebs Q6 hrs.  - Continue immunosuppression and ppx per protocol.   -Continue PT/OT/IS. Encourage coughing to clear secretions    Immunosuppression  - Previously on tacrolimus, MMF, and daily prednisone. S/p solumedrol in OR, basiliximab on POD#0 and POD#4.   - Continue MMF, tacrolimus, and steroid taper. Tacrolimus increased to 1.5 mg BID on 6/23.   - Monitor daily tacrolimus levels and adjust dose as needed.      Prophylactic antibiotic  - CMV D-/R+. Continue OIP with ganciclovir, fluconazole, and dapsone.   - Bronch wash 6/20 with  Pseudomonas. Continue vancomycin given history of MRSA.   - Continue ciprofloxacin 400 mg Q8hrs. Repeat bronchial wash cultures from 6/23 with NGTD.          CF related Pancreatic insufficiency   Viokace  enzymes with trickle tube feedings. Hold VioKace when tube feeds are held.     Steroid-induced hyperglycemia  - Endocrine consulted.  Now off Insulin gtt.  Add and titrate insulin to maintain goal BG <160     Acute blood loss anemia  - s/p 6u PRBCs, 2u plts, 1 cryo, and 4 FFP angie-operatively. Remains hemodynamically  - Conservative with future transfusions. Repeat CBC if chest tube has copious bloody output     Chronic pain with opiate use  - Anesthesia consulted for pain mgmt. Continue aggressive bowel regimen.   - PCA dilaudid ordered. Will add and titrate meds as needed  - VBG PRN to watch for development of hypercapnia      Acute hyperactive delirium due to another medical condition  - Likely multifactorial including post-op anesthesia, dissociative narcotics, ICU delirium  - Continue Ativan PRN, Dilaudid PCA.  Currently on Precedex  - Continue Precedex and wean as tolerated  -Will replace NGT today.  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 Continue Ciprofloxacin, Tobramycin x1 on 6/22.   - Repeat cultures from 6/23 with NGTD.        Preventive Measures: Nutrition: Goal: will start tube feeds once NGT replaced, Stress Ulcer: continue prophyllaxis, DVT: continue prophyllaxis, Head of Bet: elevated    Counseling/Consultation:I discussed the case with Dr. Alvarez.    Reyes Will NP  Lung Transplant  [facility]

## 2019-06-29 NOTE — EICU
1634 Verified privileges for Dr. Douglass for CT insertion. Time out done with SUSAN Wild. 3754 Lidocaine injected into right side of chest. Needle inserted, guidewire threaded needle, removed needle. Dilator inserted while guidewire held. Both dilator and guidewire removed. New kit obtained.inserted needle, glide wire, needle out,7.5 catheter, glidewire removed.connected to CT drain. [place] catheter connected to CT drainage. Sutured line down, Vaseline gauze with 4X4 over catheter. PCXR ordered.

## 2019-06-29 NOTE — ANESTHESIA POST-OP PAIN MANAGEMENT
Acute Pain Service Progress Note    Juanita Ibarra is a 29 y.o., female, [id number].    Surgery:  REDO BILATERAL LUNG TRANSPLANT    Post Op Day #: 10    Catheter type: None, bilateral ESPs removed 6/27/19      Problem List:    Active Hospital Problems    Diagnosis  POA    *S/P lung transplant [Z94.2]  Not Applicable    On enteral nutrition [Z78.9]  No    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 [A49.8, Z16.19]  Unknown    Acute hyperactive delirium due to another medical condition [F05]  Unknown    Prophylactic immunotherapy [Z29.8]  Not Applicable    Acute blood loss anemia [D62]  No    Thrombocytopenia, unspecified [D69.6]  No    Steroid-induced hyperglycemia [R73.9, T38.0X5A]  Yes    Prophylactic antibiotic [Z79.2]  Not Applicable    Constipation [K59.00]  Yes    Immunosuppression [D89.9]  Yes    Hyperglycemia [R73.9]  Yes    Adrenal cortical steroids causing adverse effect in therapeutic use [T38.0X5A]  No    CF related Pancreatic insufficiency [K86.89]  Yes    Chronic pain with opiate use [G89.29]  Yes      Resolved Hospital Problems    Diagnosis Date Resolved POA    Awaiting organ transplant [Z76.82] 06/22/2019 Not Applicable    Awaiting organ transplant [Z76.82] 06/18/2019 Not Applicable    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 Pain better controlled today. Patient resting comfortable in bed this AM. Anxiety still a significant issue per Nurse at bedside.     Objective:          Vitals   Vitals:    06/29/19 1100   BP: 128/80   Pulse: (!) 132   Resp: (!) 28   Temp: 36.7 °C (98 °F)        Labs    Admission on 06/17/2019   No results displayed because visit has over 200 results.           Meds   Current Facility-Administered Medications   Medication Dose Route Frequency Provider Last Rate Last Dose    acetylcysteine 200 mg/ml (20%)  solution 4 mL  4 mL Nebulization Q12H Jake Alvarez MD   4 mL at 06/29/19 0846    bisacodyl suppository 10 mg  10 mg Rectal Daily PRN Shama Canales PA-C   10 mg at 06/28/19 0941    ciprofloxacin (CIPRO)400mg/200ml D5W IVPB 400 mg  400 mg Intravenous Q8H Jacqueline Dumont PA-C 200 mL/hr at 06/29/19 0520 400 mg at 06/29/19 0520    dexmedetomidine (PRECEDEX) 400mcg/100mL 0.9% NaCL infusion  0.2 mcg/kg/hr Intravenous Continuous Shama Canales PA-C 1 mL/hr at 06/29/19 1100 0.06 mcg/kg/hr at 06/29/19 1100    dextrose 10% (D10W) Bolus  12.5 g Intravenous PRN Jonathan Newby MD 1,000 mL/hr at 06/19/19 0232 125 mL at 06/19/19 0232    dextrose 10% (D10W) Bolus  25 g Intravenous PRN Jonathan Newby MD        dextrose 5 % and 0.45 % NaCl with KCl 20 mEq infusion   Intravenous Continuous Jacqueline Douglass MD 5 mL/hr at 06/29/19 1100      dextrose 50% injection 12.5 g  12.5 g Intravenous PRN Amy Zapata, NP        dextrose 50% injection 25 g  25 g Intravenous PRN Amy Zapata, KUMAR        diphenhydrAMINE injection 50 mg  50 mg Intravenous On Call Procedure Francisca Morin, DO   50 mg at 06/29/19 0257    enoxaparin injection 40 mg  40 mg Subcutaneous Daily Jacqueline Dumont PA-C   40 mg at 06/28/19 1735    famotidine (PF) injection 20 mg  20 mg Intravenous BID Jacqueline Dumont PA-C   20 mg at 06/29/19 0808    fluconazole (DIFLUCAN) IVPB 400 mg 200 mL  400 mg Intravenous Q24H Shama Canales PA-C 100 mL/hr at 06/29/19 0809 400 mg at 06/29/19 0809    ganciclovir (CYTOVENE) 295 mg in sodium chloride 0.9% 100 mL IVPB  5 mg/kg Intravenous Q12H Jacqueline Douglass  mL/hr at 06/29/19 1000 295 mg at 06/29/19 1000    glucagon (human recombinant) injection 1 mg  1 mg Intramuscular PRN Amy Zapata NP        glucose chewable tablet 16 g  16 g Oral PRN Amy Zapata NP        glucose chewable tablet 24 g  24 g Oral PRN Amy Zapata NP        hydromorphone (PF)  injection 2 mg  2 mg Intravenous Q1H PRN Jhoan Antunez MD   2 mg at 06/29/19 0959    HYDROmorphone PCA 0.5 mg/mL in 30mL 0.9% Sodium Chloride in a 35mL MONOject Detach Syringe   Intravenous Continuous Farhan H MD Karen        insulin aspart U-100 pen 0-10 Units  0-10 Units Subcutaneous PRN Deny Swenson NP   2 Units at 06/29/19 0420    insulin regular (Humulin R) 100 Units in sodium chloride 0.9% 100 mL infusion  0.8 Units/hr Intravenous Continuous Deny Swenson NP 0.8 mL/hr at 06/26/19 1900 0.8 Units/hr at 06/26/19 1900    lactulose 20 gram/30 mL solution Soln 20 g  20 g Oral Q6H PRN Angelica Gomez MD        levalbuterol nebulizer solution 1.25 mg  1.25 mg Nebulization Q4H PRN Jacqueline Dumotn PA-C   1.25 mg at 06/27/19 1556    levalbuterol nebulizer solution 1.25 mg  1.25 mg Nebulization Q6H WAKE Jacqueline Dumont PA-C   1.25 mg at 06/29/19 0846    lidocaine 5 % patch 1 patch  1 patch Transdermal Q24H Farhan H MD Karen   1 patch at 06/28/19 1215    lidocaine 5 % patch 1 patch  1 patch Transdermal Q24H Farhan H MD Karen   1 patch at 06/28/19 1215    lipase-protease-amylase (VIOKACE) 20,880-78,300- 78,300 units per tablet 1 tablet  1 tablet Oral Q3H PRN Francisca Morin DO   1 tablet at 06/24/19 1700    lorazepam (ATIVAN) injection 2 mg  2 mg Intravenous Q4H PRN Jacqueline Dumont PA-C   2 mg at 06/29/19 0958    magnesium sulfate 2g in water 50mL IVPB (premix)  2 g Intravenous PRN Angelica Gomez MD   2 g at 06/29/19 0520    methylPREDNISolone sodium succinate injection 20 mg  20 mg Intravenous Daily Jake Alvarez MD   20 mg at 06/29/19 0811    metoclopramide HCl injection 5 mg  5 mg Intravenous Q6H PRN Jacqueline Douglass MD        mycophenolate (CELLCEPT) 500 mg in dextrose 5 % 100 mL IVPB  500 mg Intravenous BID Shama Canales PA-C 50 mL/hr at 06/29/19 0811 500 mg at 06/29/19 0811    naloxone 0.4 mg/mL injection 0.02 mg  0.02 mg Intravenous PRN  Riley Pennington MD        niCARdipine 40 mg/200 mL infusion  1 mg/hr Intravenous Continuous Harleen Riley MD   Stopped at 06/24/19 0200    OLANZapine injection 10 mg  10 mg Intramuscular BID PRN Jacqueline Dumont PA-C   10 mg at 06/28/19 0339    ondansetron disintegrating tablet 8 mg  8 mg Oral Q8H PRN Jacqueline Douglass MD        ondansetron injection 4 mg  4 mg Intravenous Q6H PRN Shama Canales PA-C   4 mg at 06/28/19 1916    oxyCODONE immediate release tablet 10 mg  10 mg Oral Q4H PRN Jacqueline Douglass MD        oxyCODONE immediate release tablet 5 mg  5 mg Oral Q4H PRN Jacqueline Douglass MD        polyethylene glycol packet 17 g  17 g Per NG tube BID Jacqueline Dumont PA-C   Stopped at 06/29/19 0900    potassium chloride 20 mEq in 100 mL IVPB (FOR CENTRAL LINE ADMINISTRATION ONLY)  20 mEq Intravenous PRN Jacqueline Douglass MD 50 mL/hr at 06/22/19 0647 20 mEq at 06/22/19 0647    And    potassium chloride 40 mEq in 100 mL IVPB (FOR CENTRAL LINE ADMINISTRATION ONLY)  40 mEq Intravenous PRN Jacqueline Douglass MD 25 mL/hr at 06/18/19 1749 40 mEq at 06/18/19 1749    And    potassium chloride 20 mEq in 100 mL IVPB (FOR CENTRAL LINE ADMINISTRATION ONLY)  60 mEq Intravenous PRN Jacqueline Douglass MD        promethazine (PHENERGAN) 6.25 mg in dextrose 5 % 50 mL IVPB  6.25 mg Intravenous Q6H PRN Chia Glass MD        sodium chloride 0.9% flush 10 mL  10 mL Intravenous Q6H Jake Alvarez MD   10 mL at 06/28/19 1800    And    sodium chloride 0.9% flush 10 mL  10 mL Intravenous PRN Jake Alvarez MD        sodium chloride 7% nebulizer solution 4 mL  4 mL Nebulization Q12H Jake Alvarez MD   4 mL at 06/29/19 0846    tacrolimus capsule 2 mg  2 mg Sublingual BID Jake Alvarez MD   2 mg at 06/29/19 0807    vancomycin 750 mg in dextrose 5 % 250 mL IVPB (ready to mix system)  750 mg Intravenous Q12H Francisca Morin .7 mL/hr at 06/29/19 0306 750 mg at 06/29/19  0306        Anticoagulant dose: none.    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 Patient with significant opioid requirement prior to surgery from previous lung transplant. Remained intubated s/p lung transplant, requiring fentanyl with dexmedetomidine at 1.4mcg/kg/hr and propofol 50mcg/kg/min for sedation. S/P bilateral TARA catheter placement 06/19/19 w/ hand boluses Q12H at 0700 and 1900. Patient placed on ketamine infusion on 6/20 at 10 mcg/kg/min (titrated up to 25 mcg/kg/min), extubated on 6/21 but re-intubated for respiratory distress, agitation, delirium. Ketamine discontinued. Restarted low dose non-titrating 5 mcg/kg/min on 6/24/19. Extubated 6/26/19 to HFNC. Bilateral TARA catheters pulled on 6/27/19. MBSS failed 6/28. Ketamine stopped 6/28/19.    - Ketamine stopped 6/28/19  - Dilaudid PCA reduced to 0.3mg basal, 0.2 mg q6min, 2.3mg/hr max  - Pain better controlled today. Patient resting comfortably in bed this AM. Will plan to transition to PO pain medications if patient passes swallow study.   -  Defer treatment of anxiety to primary team        Evaluator Jaron Nielson     Agree with Dr. Nielson.  MEL Buchanan MD.

## 2019-06-29 NOTE — PROGRESS NOTES
"[facility]  Endocrinology  Progress Note    Admit Date: 6/17/2019     Reason for Consult: Management of  Hyperglycemia and CF related pancreatic insufficiency.     Surgical Procedure and Date: lung transplant in 2014; 6/18/19    HPI:   Patient is a 29 y.o. female with a diagnosis of CF, HTN, migraine headache, and osteopenia. 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 No personal history of DM. Endocrinology consulted for BG management.     Lab Results   Component Value Date    HGBA1C 4.7 01/09/2019                 Interval HPI:   Overnight events: Remains in ICU, NAEON. BG elevated during the day but well controlled overnight on transition IV insulin infusion at 0.8 u/hr plus correction scale.  Solu medrol 20 mg IV daily; steroid taper per primary.  On IV antibiotics.  May start TF today per primary team.  Eating:   NPO  Nausea: No  Hypoglycemia and intervention: No  Fever: No  TPN and/or TF: No    BP (!) 86/60 (BP Location: Left arm, Patient Position: Lying)   Pulse 87   Temp 98.5 °F (36.9 °C) (Oral)   Resp 20   Ht 5' 1" (1.549 m)   Wt 64.2 kg (141 lb 8.6 oz)   LMP 10/02/2016   SpO2 99%   Breastfeeding? No   BMI 26.74 kg/m²      Labs Reviewed and Include    Recent Labs   Lab 06/29/19  0359   *   CALCIUM 8.0*   ALBUMIN 2.2*   PROT 5.2*   *   K 4.6   CO2 20*      BUN 20   CREATININE 0.8   ALKPHOS 225*   *   AST 53*   BILITOT 0.5     Lab Results   Component Value Date    WBC 16.81 (H) 06/29/2019    HGB 8.3 (L) 06/29/2019    HCT 26.9 (L) 06/29/2019    MCV 89 06/29/2019     (L) 06/29/2019     No results for input(s): TSH, FREET4 in the last 168 hours.  Lab Results   Component Value Date    HGBA1C 4.7 01/09/2019       Nutritional status:   Body mass index is 26.74 kg/m².  Lab Results   Component Value Date    ALBUMIN 2.2 (L) 06/29/2019    ALBUMIN 2.5 (L) 06/28/2019    ALBUMIN 2.6 (L) 06/27/2019     Lab " Results   Component Value Date    PREALBUMIN 15 (L) 05/29/2014    PREALBUMIN 11 (L) 05/09/2014    PREALBUMIN 18 (L) 03/19/2014       Estimated Creatinine Clearance: 88.3 mL/min (based on SCr of 0.8 mg/dL).    Accu-Checks  Recent Labs     06/27/19  1619 06/27/19  2009 06/28/19  0029 06/28/19  0400 06/28/19  0810 06/28/19  1223 06/28/19  1555 06/28/19  1958 06/29/19  0018 06/29/19  0419   POCTGLUCOSE 263* 247* 148* 125* 175* 249* 329* 212* 124* 210*       Current Medications and/or Treatments Impacting Glycemic Control  Immunotherapy:    Immunosuppressants         Stop Route Frequency     tacrolimus capsule 2 mg      -- SL 2 times daily     mycophenolate (CELLCEPT) 500 mg in dextrose 5 % 100 mL IVPB      -- IV 2 times daily        Steroids:   Hormones (From admission, onward)    Start     Stop Route Frequency Ordered    06/27/19 0945  methylPREDNISolone sodium succinate injection 20 mg      -- IV Daily 06/27/19 0831    06/26/19 1827  dexAMETHasone sodium phos (PF) 10 mg/mL injection     Note to Pharmacy:  Created by cabinet override    06/27 0629   06/26/19 1827        Pressors:    Autonomic Drugs (From admission, onward)    None        Hyperglycemia/Diabetes Medications:   Antihyperglycemics (From admission, onward)    Start     Stop Route Frequency Ordered    06/28/19 1502  insulin aspart U-100 pen 0-10 Units      -- SubQ As needed (PRN) 06/28/19 1403    06/20/19 1545  insulin regular (Humulin R) 100 Units in sodium chloride 0.9% 100 mL infusion      -- IV Continuous 06/20/19 1442          ASSESSMENT and PLAN    * S/P lung transplant  Managed per LUT.   avoid hypoglycemia        Hyperglycemia  BG goal 140 - 180.     Transition IV insulin infusion at 0.8 u/hr  Moderate dose correction scale  BG monitoring every 4 hours while NPO    Discharge planning: TBD    CF related Pancreatic insufficiency  May impact BG.       Adrenal cortical steroids causing adverse effect in therapeutic use  Glucocorticoids markedly increase  blood glucose. Expect the steroid taper will help glucose control.         Prophylactic immunotherapy  May increase insulin resistance.           Deny Swenson NP  Endocrinology  [facility]

## 2019-06-29 NOTE — ASSESSMENT & PLAN NOTE
- Likely multifactorial including post-op anesthesia, dissociative narcotics, ICU delirium  - Continue Ativan PRN, Dilaudid PCA.  Currently on Precedex  - Continue Precedex and wean as tolerated  -Will replace NGT today.  Will restart home Seroquel via NGT

## 2019-06-29 NOTE — NURSING
Reyes Will NP notified of patients -150s. SBP 130s. Awaiting orders. Also notified him that patient is still not allowing RN to place NGT as of now.

## 2019-06-29 NOTE — SUBJECTIVE & OBJECTIVE
Subjective:     Interval History: No acute events overnight.  Patient slept well last night.  NG tube was removed by patient last night.    Continuous Infusions:   dexmedetomidine (PRECEDEX) infusion 0.06 mcg/kg/hr (06/29/19 1200)    dextrose 5 % and 0.45 % NaCl with KCl 20 mEq 5 mL/hr at 06/29/19 1200    HYDROmorphone PCA 0.5 mg/mL in 30mL 0.9% Sodium Chloride in a 35mL MONOject Detach Syringe      insulin (HUMAN R) infusion (adults) 0.8 Units/hr (06/26/19 1900)    nicardipine Stopped (06/24/19 0200)     Scheduled Meds:   acetylcysteine 200 mg/ml (20%)  4 mL Nebulization Q12H    ciprofloxacin  400 mg Intravenous Q8H    enoxaparin  40 mg Subcutaneous Daily    famotidine (PF)  20 mg Intravenous BID    fluconazole (DIFLUCAN) IVPB  400 mg Intravenous Q24H    ganciclovir (CYTOVENE) IVPB  5 mg/kg Intravenous Q12H    levalbuterol  1.25 mg Nebulization Q6H WAKE    lidocaine  1 patch Transdermal Q24H    lidocaine  1 patch Transdermal Q24H    methylPREDNISolone sodium succinate  20 mg Intravenous Daily    mycophenolate (CELLCEPT) IVPB  500 mg Intravenous BID    polyethylene glycol  17 g Per NG tube BID    sodium chloride 0.9%  10 mL Intravenous Q6H    sodium chloride 7%  4 mL Nebulization Q12H    tacrolimus  2 mg Sublingual BID    vancomycin (VANCOCIN) IVPB  750 mg Intravenous Q12H     PRN Meds:bisacodyl, Dextrose 10% Bolus, Dextrose 10% Bolus, dextrose 50%, dextrose 50%, diphenhydrAMINE, glucagon (human recombinant), glucose, glucose, HYDROmorphone, insulin aspart U-100, lactulose, levalbuterol, lipase-protease-amylase (VIOKACE) 20,880-78,300- 78,300 units, lorazepam, magnesium sulfate IVPB, metoclopramide HCl, naloxone, OLANZapine, ondansetron, ondansetron, oxyCODONE, oxyCODONE, potassium chloride in water **AND** potassium chloride in water **AND** potassium chloride in water, promethazine (PHENERGAN) IVPB, Flushing PICC Protocol **AND** sodium chloride 0.9% **AND** sodium chloride 0.9%    Review of  patient's allergies indicates:   Allergen Reactions    Albuterol Palpitations    Colistin Anaphylaxis    Vancomycin analogues      Infusion reaction that does not resolve with slowing  Pt. States she can tolerate it when given with 50 mg Benadryl and ran over 3 hours    Neupogen [filgrastim] Other (See Comments)     Ostealgia after five daily doses of 300 mcg.      Bactrim [sulfamethoxazole-trimethoprim] Hives    Ceftazidime Hives     Pt stated can tolerate cefapine not ceftazidime    Ceftazidime     Dronabinol Other (See Comments)     Mental changes/hallucinations    Haldol [haloperidol lactate] Other (See Comments)     Seizure like activity    Nsaids (non-steroidal anti-inflammatory drug)      Cannot have due to lung transplant    Adhesive Rash     Cloth tape- please use tegaderm or paper tape    Aztreonam Rash    Ciprofloxacin Nausea And Vomiting     Projectile N/V, per patient.  Unwilling to retry therapy.       Review of Systems   Constitutional: Positive for fatigue. Negative for appetite change, chills, diaphoresis and fever.   HENT: Negative for congestion, postnasal drip, rhinorrhea, sinus pressure and sinus pain.    Respiratory: Positive for cough, chest tightness and shortness of breath. Negative for wheezing.    Cardiovascular: Positive for chest pain (insertion site). Negative for palpitations and leg swelling.   Gastrointestinal: Negative for abdominal distention, diarrhea, nausea and vomiting.   Genitourinary: Negative for dysuria.   Skin: Negative for rash and wound.   Neurological: Positive for weakness. Negative for dizziness, syncope and headaches.   Psychiatric/Behavioral: The patient is nervous/anxious.      Objective:   Physical Exam   Constitutional: She appears well-developed and well-nourished. She is easily aroused. No distress. Nasal cannula in place.   HENT:   Head: Normocephalic and atraumatic.   Mouth/Throat: Oropharynx is clear and moist. No oropharyngeal exudate.   Eyes:  Conjunctivae are normal. No scleral icterus.   Neck: No JVD present.   Right IJ CVC, CDI   Cardiovascular: Regular rhythm. Tachycardia present. Exam reveals no friction rub.   No murmur heard.  Pulmonary/Chest: No accessory muscle usage. No respiratory distress. She has no decreased breath sounds. She has no wheezes. She has rhonchi (scattered). She has no rales.   Bilateral pleural chest tubes in place   Abdominal: Soft. Bowel sounds are normal. She exhibits no distension. There is no tenderness.   Musculoskeletal: She exhibits no edema.   Neurological: She is alert and easily aroused. No cranial nerve deficit.   Skin: Skin is warm and dry. No rash noted. She is not diaphoretic. No erythema.   Psychiatric: Her mood appears anxious.   Nursing note and vitals reviewed.        Vital Signs (Most Recent):  Temp: 98 °F (36.7 °C) (06/29/19 1100)  Pulse: (!) 144 (06/29/19 1200)  Resp: (!) 22 (06/29/19 1200)  BP: (!) 130/93 (06/29/19 1200)  SpO2: 99 % (06/29/19 1200) Vital Signs (24h Range):  Temp:  [98 °F (36.7 °C)-99.3 °F (37.4 °C)] 98 °F (36.7 °C)  Pulse:  [] 144  Resp:  [17-49] 22  SpO2:  [87 %-100 %] 99 %  BP: ()/() 130/93     Weight: 64.2 kg (141 lb 8.6 oz)  Body mass index is 26.74 kg/m².      Intake/Output Summary (Last 24 hours) at 6/29/2019 1244  Last data filed at 6/29/2019 0900  Gross per 24 hour   Intake 2695.68 ml   Output 2050 ml   Net 645.68 ml       Ventilator Data:          Hemodynamic Parameters:       Lines/Drains:       Introducer right internal jugular (Active)   Specific Qualities Capped 6/29/2019 11:04 AM   Dressing Status Clean;Dry;Biopatch in place;Intact 6/29/2019 11:04 AM   Dressing Intervention Dressing reinforced 6/29/2019  3:00 AM   Dressing Change Due 06/29/19 6/29/2019  3:00 AM   Daily Line Review Performed 6/29/2019 11:04 AM   Number of days:             Port A Cath Single Lumen 06/24/14 1200 right subclavian (Active)   Accessed by: Radha Lanza 6/24/2019  5:00 AM    Dressing Type Transparent 6/29/2019 11:04 AM   Dressing Status Biopatch in place;Clean;Dry;Intact 6/29/2019 11:04 AM   Dressing Intervention Dressing reinforced 6/29/2019  3:00 AM   Dressing Change Due 06/30/19 6/29/2019  3:00 AM   Line Status Infusing 6/29/2019 11:04 AM   Flush Performed Yes 6/29/2019 11:04 AM   Date to be Reflushed 06/18/19 6/29/2019  3:00 AM   Daily Line Review Performed 6/29/2019 11:04 AM   Type of Needle Nicole 6/29/2019  3:00 AM   Gauge 20 6/29/2019  3:00 AM   Needle Length 1 in 6/26/2019  3:00 AM   Needle Status Left in place 6/29/2019  3:00 AM   Needle Insertion Date 06/24/19 6/26/2019  3:00 AM   Needle Insertion Time 0500 6/26/2019  3:00 AM   Number of days: 1831            Percutaneous Central Line Insertion/Assessment - double lumen  06/18/19 0510 (Active)   Dressing biopatch in place;dressing dry and intact 6/29/2019 11:04 AM   Securement secured w/ sutures 6/29/2019 11:04 AM   Additional Site Signs no erythema;no warmth 6/29/2019 11:04 AM   Distal Patency/Care infusing 6/29/2019 11:04 AM   Proximal Patency/Care infusing 6/29/2019 11:04 AM   Waveform normal 6/29/2019 11:04 AM   Line Interventions line leveled/zeroed 6/29/2019 11:04 AM   Dressing Change Due 06/29/19 6/29/2019  3:00 AM   Daily Line Review Performed 6/29/2019 11:04 AM   Number of days: 11            Peripheral IV - Single Lumen 06/27/19 0917 22 G Right Forearm (Active)   Site Assessment Clean;Dry;Intact;No redness;No swelling 6/29/2019 11:04 AM   Line Status Infusing 6/29/2019 11:04 AM   Dressing Status Clean;Dry;Intact 6/29/2019 11:04 AM   Dressing Intervention Dressing reinforced 6/29/2019  3:00 AM   Dressing Change Due 07/01/19 6/29/2019  3:00 AM   Site Change Due 07/01/19 6/28/2019  7:31 PM   Reason Not Rotated Not due 6/29/2019 11:04 AM   Number of days: 2            Y Chest Tube 3 and 4 06/18/19 1151 3 Left Pleural 19 Fr. 4 Left Mediastinal 19 Fr. (Active)   Function -20 cm H2O 6/29/2019 11:04 AM   Air  Leak/Fluctuation air leak present 6/29/2019 11:04 AM   Safety all tubing connections taped;2 rubber-tipped hemostats w/ patient;all connections secured;suction checked 6/29/2019 11:04 AM   Securement tubing secured to body distal to insertion site w/ tape 6/29/2019 11:04 AM   Left Subcutaneous Emphysema none present 6/29/2019 11:04 AM   Right Subcutaneous Emphysema none present 6/29/2019 11:04 AM   Patency Intervention Tip/tilt 6/29/2019 11:04 AM   Drainage Description 3 Serosanguineous 6/29/2019 11:04 AM   Tube 3 Dressing Appearance occlusive gauze dressing intact 6/29/2019 11:04 AM   Tube 3 Dressing Care dressing reinforced 6/29/2019  3:00 AM   Site Assessment 3 Not assessed 6/29/2019 11:04 AM   Surrounding Skin 3 Dry;Intact 6/29/2019 11:04 AM   Drainage Description 4 Serosanguineous 6/29/2019 11:04 AM   Tube 4 Dressing Appearance occlusive gauze dressing intact 6/29/2019 11:04 AM   Tube 4 Dressing Care dressing reinforced 6/29/2019  3:00 AM   Site Assessment 4 Not assessed 6/29/2019 11:04 AM   Surrounding Skin 4 Dry;Intact 6/29/2019 11:04 AM   Output (mL) 20 mL 6/29/2019  3:00 AM   Number of days: 11       Significant Labs:  CBC:  Recent Labs   Lab 06/29/19 0359   WBC 16.81*   RBC 3.03*   HGB 8.3*   HCT 26.9*   *   MCV 89   MCH 27.4   MCHC 30.9*     BMP:  Recent Labs   Lab 06/29/19 0359   *   K 4.6      CO2 20*   BUN 20   CREATININE 0.8   CALCIUM 8.0*      Tacrolimus Levels:  Recent Labs   Lab 06/29/19  0359   TACROLIMUS 10.0     Microbiology:  Microbiology Results (last 7 days)     Procedure Component Value Units Date/Time    Fungus culture [[id number]]  (Abnormal) Collected:  06/18/19 0930    Order Status:  Completed Specimen:  Tissue from Lung, Left Updated:  06/27/19 1055     Fungus (Mycology) Culture CANDIDA ALBICANS  Moderate      Narrative:       Donor lung    Fungus culture [[id number]]  (Abnormal) Collected:  06/23/19 1735    Order Status:  Completed Specimen:  Respiratory from  Bronchial Wash Updated:  06/27/19 1048     Fungus (Mycology) Culture BRANDI GLABRATA  Few      Culture, Respiratory [[id number]]  (Susceptibility) Collected:  06/25/19 0900    Order Status:  Completed Specimen:  Respiratory from Bronchial Wash Updated:  06/27/19 0905     Respiratory Culture No S aureus isolated.      PSEUDOMONAS AERUGINOSA   Few       Gram Stain (Respiratory) <10 epithelial cells per low power field.     Gram Stain (Respiratory) Many WBC's     Gram Stain (Respiratory) No organisms seen    Narrative:       Bronchial Wash    AFB Culture & Smear [[id number]] Collected:  06/25/19 0900    Order Status:  Completed Specimen:  Respiratory from Bronchial Wash Updated:  06/26/19 2127     AFB Culture & Smear Culture in progress     AFB CULTURE STAIN No acid fast bacilli seen.    Narrative:       Bronchial Wash    Fungus culture [[id number]] Collected:  06/25/19 0900    Order Status:  Completed Specimen:  Respiratory from Bronchial Wash Updated:  06/26/19 1102     Fungus (Mycology) Culture Culture in progress    Narrative:       Bronchial Wash    Fungus culture [[id number]] Collected:  06/20/19 0808    Order Status:  Completed Specimen:  Respiratory from Bronchial Wash Updated:  06/26/19 1101     Fungus (Mycology) Culture Culture in progress    Culture, Respiratory with Gram Stain [[id number]]  (Susceptibility) Collected:  06/23/19 1735    Order Status:  Completed Specimen:  Respiratory from Bronchial Wash Updated:  06/26/19 0752     Respiratory Culture No S aureus isolated.      PSEUDOMONAS AERUGINOSA   Few  Normal respiratory anibal also present       Gram Stain (Respiratory) <10 epithelial cells per low power field.     Gram Stain (Respiratory) Few WBC's     Gram Stain (Respiratory) Rare Gram negative rods    AFB Culture & Smear [[id number]] Collected:  06/23/19 1735    Order Status:  Completed Specimen:  Respiratory from Bronchial Wash Updated:  06/24/19 2127     AFB Culture & Smear Culture in progress      AFB CULTURE STAIN No acid fast bacilli seen.    Culture, Anaerobe [[id number]] Collected:  06/18/19 0930    Order Status:  Completed Specimen:  Tissue from Lung, Left Updated:  06/24/19 1107     Anaerobic Culture No anaerobes isolated    Narrative:       Donor lung          I have reviewed all pertinent labs within the past 24 hours.    Diagnostic Results:  Chest X-Ray: Pulmonary vascularity is mildly increased, but stable.  As previously, there is bilateral scattered alveolar opacification, which could be due to infection or edema.  Bilateral pneumothoraces remain,

## 2019-06-30 NOTE — NURSING
ASHISH. ERIK. FC. Multiple transfers from bed to bsc throughout shift. Max assist needed. ST. PRN Metoprolol for HR >130. BP WDL. Sats remain >90% on RA while awake. 1.5-2L NC during naps. Afebrile. Pain management with Dilaudid PCA and Dilaudid IVP. Ativan q4h prn anxiety. Precedex weaned off. Intermittent nausea. Zofran administered PRN.  Pt still refuses NGT. No acute events Sig other at bedside. Updated on plan of care. Call light in reach. Pt demonstrates proper use. Bed locked in low position.

## 2019-06-30 NOTE — PROGRESS NOTES
[facility]  Lung Transplant  Progress Note - Critical Care    Patient Name: Juanita Ibarra  MRN: [id number]  Admission Date: 6/17/2019  Hospital Length of Stay: 12 days  Post-Operative Day: 1844 (Lung), 12 (Lung)  Attending Physician: Jake Alvarez MD  Primary Care Provider: Primary Doctor No     Subjective:     Interval History: Right sided chest tube placed yesterday evening by CTS for pneumothorax with good results on CXR.  Otherwise no changed overnight.  Continues to progess    Continuous Infusions:   dexmedetomidine (PRECEDEX) infusion Stopped (06/30/19 1000)    HYDROmorphone PCA 0.5 mg/mL in 30mL 0.9% Sodium Chloride in a 35mL MONOject Detach Syringe      insulin (HUMAN R) infusion (adults) 0.8 Units/hr (06/26/19 1900)    nicardipine Stopped (06/24/19 0200)     Scheduled Meds:   acetylcysteine 200 mg/ml (20%)  4 mL Nebulization Q12H    ciprofloxacin  400 mg Intravenous Q8H    enoxaparin  40 mg Subcutaneous Daily    famotidine (PF)  20 mg Intravenous BID    fluconazole (DIFLUCAN) IVPB  400 mg Intravenous Q24H    ganciclovir (CYTOVENE) IVPB  5 mg/kg Intravenous Q12H    levalbuterol  1.25 mg Nebulization Q6H WAKE    lidocaine  1 patch Transdermal Q24H    lidocaine  1 patch Transdermal Q24H    methylPREDNISolone sodium succinate  20 mg Intravenous Daily    metoprolol  5 mg Intravenous Once    mycophenolate (CELLCEPT) IVPB  500 mg Intravenous BID    polyethylene glycol  17 g Per NG tube BID    sodium chloride 0.9%  10 mL Intravenous Q6H    sodium chloride 7%  4 mL Nebulization Q12H    tacrolimus  2 mg Sublingual BID    vancomycin (VANCOCIN) IVPB  750 mg Intravenous Q12H     PRN Meds:bisacodyl, Dextrose 10% Bolus, Dextrose 10% Bolus, dextrose 50%, dextrose 50%, diphenhydrAMINE, glucagon (human recombinant), glucose, glucose, HYDROmorphone, insulin aspart U-100, lactulose, levalbuterol, lipase-protease-amylase (VIOKACE) 20,880-78,300- 78,300 units, lorazepam,  magnesium sulfate IVPB, metoclopramide HCl, naloxone, OLANZapine, ondansetron, ondansetron, oxyCODONE, oxyCODONE, potassium chloride in water **AND** potassium chloride in water **AND** potassium chloride in water, promethazine (PHENERGAN) IVPB, Flushing PICC Protocol **AND** sodium chloride 0.9% **AND** sodium chloride 0.9%    Review of patient's allergies indicates:   Allergen Reactions    Albuterol Palpitations    Colistin Anaphylaxis    Vancomycin analogues      Infusion reaction that does not resolve with slowing  Pt. States she can tolerate it when given with 50 mg Benadryl and ran over 3 hours    Neupogen [filgrastim] Other (See Comments)     Ostealgia after five daily doses of 300 mcg.      Bactrim [sulfamethoxazole-trimethoprim] Hives    Ceftazidime Hives     Pt stated can tolerate cefapine not ceftazidime    Ceftazidime     Dronabinol Other (See Comments)     Mental changes/hallucinations    Haldol [haloperidol lactate] Other (See Comments)     Seizure like activity    Nsaids (non-steroidal anti-inflammatory drug)      Cannot have due to lung transplant    Adhesive Rash     Cloth tape- please use tegaderm or paper tape    Aztreonam Rash    Ciprofloxacin Nausea And Vomiting     Projectile N/V, per patient.  Unwilling to retry therapy.       Review of Systems   Constitutional: Negative for appetite change, chills, diaphoresis and fever.   HENT: Negative for congestion, postnasal drip, rhinorrhea, sinus pressure and sinus pain.    Respiratory: Positive for cough and shortness of breath. Negative for wheezing.    Cardiovascular: Positive for chest pain (insertion site). Negative for palpitations and leg swelling.   Gastrointestinal: Negative for abdominal distention, diarrhea, nausea and vomiting.   Genitourinary: Negative for dysuria.   Skin: Negative for rash and wound.   Neurological: Positive for weakness. Negative for dizziness, syncope and headaches.   Psychiatric/Behavioral: The patient is  nervous/anxious.      Objective:   Physical Exam   Constitutional: She appears well-developed and well-nourished. She is easily aroused. No distress. Nasal cannula in place.   HENT:   Head: Normocephalic and atraumatic.   Mouth/Throat: Oropharynx is clear and moist. No oropharyngeal exudate.   Eyes: Conjunctivae are normal. No scleral icterus.   Neck: No JVD present.   Right IJ CVC, CDI   Cardiovascular: Regular rhythm. Tachycardia present. Exam reveals no friction rub.   No murmur heard.  Pulmonary/Chest: No accessory muscle usage. No respiratory distress. She has no decreased breath sounds. She has no wheezes. She has rhonchi (scattered). She has no rales.   Bilateral chest tubes in place   Abdominal: Soft. Bowel sounds are normal. She exhibits no distension. There is no tenderness.   Musculoskeletal: She exhibits no edema.   Neurological: She is alert and easily aroused. No cranial nerve deficit.   Skin: Skin is warm and dry. No rash noted. She is not diaphoretic. No erythema.   Psychiatric: Her mood appears anxious.   Nursing note and vitals reviewed.        Vital Signs (Most Recent):  Temp: 97.8 °F (36.6 °C) (06/30/19 1100)  Pulse: (!) 137 (06/30/19 1102)  Resp: (!) 30 (06/30/19 1102)  BP: 127/87 (06/30/19 1100)  SpO2: 97 % (06/30/19 1102) Vital Signs (24h Range):  Temp:  [97.6 °F (36.4 °C)-98.6 °F (37 °C)] 97.8 °F (36.6 °C)  Pulse:  [] 137  Resp:  [13-37] 30  SpO2:  [85 %-100 %] 97 %  BP: ()/() 127/87     Weight: 65 kg (143 lb 4.8 oz)  Body mass index is 27.08 kg/m².      Intake/Output Summary (Last 24 hours) at 6/30/2019 1116  Last data filed at 6/30/2019 1059  Gross per 24 hour   Intake 1777 ml   Output 380 ml   Net 1397 ml       Ventilator Data:          Hemodynamic Parameters:       Lines/Drains:       Introducer right internal jugular (Active)   Specific Qualities Capped 6/30/2019  7:10 AM   Dressing Status Clean;Dry;Biopatch in place;Intact 6/30/2019  7:10 AM   Dressing Intervention  Dressing reinforced 6/29/2019  3:00 AM   Dressing Change Due 06/29/19 6/30/2019  3:00 AM   Daily Line Review Performed 6/30/2019  7:10 AM   Number of days:             Port A Cath Single Lumen 06/24/14 1200 right subclavian (Active)   Accessed by: Radha Lanza 6/24/2019  5:00 AM   Dressing Type Transparent 6/30/2019  7:10 AM   Dressing Status Biopatch in place;Clean;Dry;Intact 6/30/2019  7:10 AM   Dressing Intervention Dressing reinforced 6/29/2019  3:00 AM   Dressing Change Due 06/30/19 6/30/2019  3:00 AM   Line Status Infusing 6/30/2019  7:10 AM   Flush Performed Yes 6/30/2019  7:10 AM   Date to be Reflushed 06/18/19 6/29/2019  3:00 AM   Daily Line Review Performed 6/30/2019  7:10 AM   Type of Needle Nicole 6/30/2019  3:00 AM   Gauge 20 6/30/2019  3:00 AM   Needle Length 1 in 6/30/2019  3:00 AM   Needle Status Left in place 6/30/2019  3:00 AM   Needle Insertion Date 06/24/19 6/30/2019  3:00 AM   Needle Insertion Time 0500 6/26/2019  3:00 AM   Number of days: 1831            Percutaneous Central Line Insertion/Assessment - double lumen  06/18/19 0510 (Active)   Dressing biopatch in place;dressing dry and intact 6/30/2019  7:10 AM   Securement secured w/ sutures 6/30/2019  7:10 AM   Additional Site Signs no erythema;no warmth 6/30/2019  7:10 AM   Distal Patency/Care infusing 6/30/2019  7:10 AM   Proximal Patency/Care infusing 6/30/2019  7:10 AM   Waveform normal 6/30/2019  7:10 AM   Line Interventions line leveled/zeroed 6/30/2019  7:10 AM   Dressing Change Due 06/29/19 6/30/2019  3:00 AM   Daily Line Review Performed 6/30/2019  7:10 AM   Number of days: 12            Peripheral IV - Single Lumen 06/27/19 0917 22 G Right Forearm (Active)   Site Assessment Clean;Dry;Intact;No redness;No swelling 6/30/2019  7:10 AM   Line Status Infusing 6/30/2019  7:10 AM   Dressing Status Clean;Dry;Intact 6/30/2019  7:10 AM   Dressing Intervention Dressing reinforced 6/29/2019  3:00 AM   Dressing Change Due 07/01/19 6/30/2019  3:00  AM   Site Change Due 07/01/19 6/29/2019  7:00 PM   Reason Not Rotated Not due 6/30/2019  7:10 AM   Number of days: 3            Chest Tube 06/29/19 1655 1 Right Fourth intercostal space;Midaxillary 7 Fr. (Active)   Chest Tube WDL WDL 6/30/2019  7:10 AM   Function -20 cm H2O 6/30/2019  7:10 AM   Air Leak/Fluctuation air leak present 6/30/2019  7:10 AM   Safety all tubing connections taped;2 rubber-tipped hemostats w/ patient;all connections secured;suction checked 6/30/2019  7:10 AM   Securement tubing secured to body distal to insertion site w/ tape 6/30/2019  7:10 AM   Patency Intervention Tip/tilt 6/30/2019  7:10 AM   Drainage Description Serosanguineous 6/30/2019  7:10 AM   Dressing Appearance occlusive gauze dressing intact 6/30/2019  7:10 AM   Dressing Care dressing changed 6/30/2019 10:00 AM   Left Subcutaneous Emphysema none present 6/30/2019  7:10 AM   Right Subcutaneous Emphysema none present 6/30/2019  7:10 AM   Site Assessment Clean;Dry;Intact;No redness;No swelling;Ecchymotic 6/30/2019 10:00 AM   Surrounding Skin Dry;Intact 6/30/2019  7:10 AM   Output (mL) 0 mL 6/30/2019 10:00 AM   Number of days: 0            Y Chest Tube 3 and 4 06/18/19 1151 3 Left Pleural 19 Fr. 4 Left Mediastinal 19 Fr. (Active)   Function -20 cm H2O 6/30/2019  7:10 AM   Air Leak/Fluctuation air leak not present 6/30/2019  7:10 AM   Safety all tubing connections taped;2 rubber-tipped hemostats w/ patient;all connections secured;suction checked 6/30/2019  7:10 AM   Securement tubing secured to body distal to insertion site w/ tape 6/30/2019  7:10 AM   Left Subcutaneous Emphysema none present 6/30/2019  7:10 AM   Right Subcutaneous Emphysema none present 6/30/2019  7:10 AM   Patency Intervention Tip/tilt 6/30/2019  7:10 AM   Drainage Description 3 Serosanguineous 6/30/2019  7:10 AM   Tube 3 Dressing Appearance occlusive gauze dressing intact 6/30/2019  7:10 AM   Tube 3 Dressing Care dressing changed 6/30/2019 10:00 AM   Site  Assessment 3 Clean;Dry;Intact 6/30/2019 10:00 AM   Surrounding Skin 3 Dry;Intact 6/30/2019  7:10 AM   Drainage Description 4 Serosanguineous 6/30/2019  7:10 AM   Tube 4 Dressing Appearance occlusive gauze dressing intact 6/30/2019  7:10 AM   Tube 4 Dressing Care dressing changed 6/30/2019 10:00 AM   Site Assessment 4 Clean;Dry;Intact 6/30/2019 10:00 AM   Surrounding Skin 4 Dry;Intact 6/30/2019  7:10 AM   Output (mL) 0 mL 6/30/2019 10:00 AM   Number of days: 11       Significant Labs:  CBC:  Recent Labs   Lab 06/30/19  0500   WBC 9.89   RBC 2.71*   HGB 7.6*   HCT 24.4*   *   MCV 90   MCH 28.0   MCHC 31.1*     BMP:  Recent Labs   Lab 06/30/19  0500   *   K 4.6      CO2 18*   BUN 24*   CREATININE 0.8   CALCIUM 8.2*      Tacrolimus Levels:  Recent Labs   Lab 06/30/19  0500   TACROLIMUS 14.3     Microbiology:  Microbiology Results (last 7 days)     Procedure Component Value Units Date/Time    Fungus culture [[id number]]  (Abnormal) Collected:  06/18/19 0930    Order Status:  Completed Specimen:  Tissue from Lung, Left Updated:  06/27/19 1055     Fungus (Mycology) Culture CANDIDA ALBICANS  Moderate      Narrative:       Donor lung    Fungus culture [[id number]]  (Abnormal) Collected:  06/23/19 1735    Order Status:  Completed Specimen:  Respiratory from Bronchial Wash Updated:  06/27/19 1048     Fungus (Mycology) Culture BRANDI GLABRATA  Few      Culture, Respiratory [[id number]]  (Susceptibility) Collected:  06/25/19 0900    Order Status:  Completed Specimen:  Respiratory from Bronchial Wash Updated:  06/27/19 0905     Respiratory Culture No S aureus isolated.      PSEUDOMONAS AERUGINOSA   Few       Gram Stain (Respiratory) <10 epithelial cells per low power field.     Gram Stain (Respiratory) Many WBC's     Gram Stain (Respiratory) No organisms seen    Narrative:       Bronchial Wash    AFB Culture & Smear [[id number]] Collected:  06/25/19 0900    Order Status:  Completed Specimen:  Respiratory from  Bronchial Wash Updated:  06/26/19 2127     AFB Culture & Smear Culture in progress     AFB CULTURE STAIN No acid fast bacilli seen.    Narrative:       Bronchial Wash    Fungus culture [[id number]] Collected:  06/25/19 0900    Order Status:  Completed Specimen:  Respiratory from Bronchial Wash Updated:  06/26/19 1102     Fungus (Mycology) Culture Culture in progress    Narrative:       Bronchial Wash    Fungus culture [[id number]] Collected:  06/20/19 0808    Order Status:  Completed Specimen:  Respiratory from Bronchial Wash Updated:  06/26/19 1101     Fungus (Mycology) Culture Culture in progress    Culture, Respiratory with Gram Stain [[id number]]  (Susceptibility) Collected:  06/23/19 1735    Order Status:  Completed Specimen:  Respiratory from Bronchial Wash Updated:  06/26/19 0752     Respiratory Culture No S aureus isolated.      PSEUDOMONAS AERUGINOSA   Few  Normal respiratory anibal also present       Gram Stain (Respiratory) <10 epithelial cells per low power field.     Gram Stain (Respiratory) Few WBC's     Gram Stain (Respiratory) Rare Gram negative rods    AFB Culture & Smear [[id number]] Collected:  06/23/19 1735    Order Status:  Completed Specimen:  Respiratory from Bronchial Wash Updated:  06/24/19 2127     AFB Culture & Smear Culture in progress     AFB CULTURE STAIN No acid fast bacilli seen.    Culture, Anaerobe [[id number]] Collected:  06/18/19 0930    Order Status:  Completed Specimen:  Tissue from Lung, Left Updated:  06/24/19 1107     Anaerobic Culture No anaerobes isolated    Narrative:       Donor lung          I have reviewed all pertinent labs within the past 24 hours.    Diagnostic Results:  Chest X-Ray: There has been interval placement of right pleural drainage catheter.  There has been near complete re-expansion of the right lung, small right apical pneumothorax remains.  There appears to have been interval improvement in the left pneumothorax.  No significant detrimental change,              Assessment/Plan:     * S/P lung transplant  POD#8 s/p bilateral lung transplant (re-transplant) for CARLOS EDUARDO. 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 Remains PGD 0. S/p bronch and extubated to HFNC on POD#2.  CTS to manage chest tubes.   - now extubated to NC and doing well. Continue Xopenex nebs Q6 hrs.  - Continue immunosuppression and ppx per protocol.   -Continue PT/OT/IS. Encourage coughing to clear secretions  -Right sided chest tube placed on 6/29 by CTS for pneumothorax with complete reexpansion noted on follow up CXR  -Will attempt to wean off Precedex in order to transfer to U  -Patient refusing NGT placement    Immunosuppression  - Previously on tacrolimus, MMF, and daily prednisone. S/p solumedrol in OR, basiliximab on POD#0 and POD#4.   - Continue MMF, tacrolimus, and steroid taper.   - Monitor daily tacrolimus levels and adjust dose as needed.       Prophylactic antibiotic  - CMV D-/R+. Continue OIP with ganciclovir, fluconazole, and dapsone.   - Bronch wash 6/20 with  Pseudomonas. Continue vancomycin given history of MRSA.   - Continue ciprofloxacin 400 mg Q8hrs. Repeat bronchial wash cultures from 6/23 with NGTD.           CF related Pancreatic insufficiency  Viokace enzymes    Steroid-induced hyperglycemia  - Endocrine consulted.  Now off Insulin gtt.  Add and titrate insulin to maintain goal BG <160      Acute blood loss anemia  - s/p 6u PRBCs, 2u plts, 1 cryo, and 4 FFP angie-operatively. Remains hemodynamically  - Conservative with future transfusions. Repeat CBC if chest tube has copious bloody output      Chronic pain with opiate use  - Anesthesia consulted for pain mgmt. Continue aggressive bowel regimen.   - PCA dilaudid ordered. Will add oral meds once tolerating PO diet and titrate meds as needed  - VBG PRN to watch for development of hypercapnia       Acute hyperactive delirium due to another medical condition  - Likely multifactorial including post-op anesthesia, dissociative  narcotics, ICU delirium  - Continue Ativan PRN, Dilaudid PCA.  Currently on Precedex  - Continue Precedex and wean as tolerated  -Patient refusing NGT replacement.      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 Continue Ciprofloxacin, Tobramycin x1 on 6/22.   - Repeat cultures from 6/23 with NGTD.         Preventive Measures: Stress Ulcer: continue prophyllaxis, DVT: continue prophyllaxis, Head of Bet: elevated    Counseling/Consultation:Dr. Alvarez discussed the patient's condition/prognosis with the family and patient.    Reyes Will NP  Lung Transplant  [facility]

## 2019-06-30 NOTE — ASSESSMENT & PLAN NOTE
- Anesthesia consulted for pain mgmt. Continue aggressive bowel regimen.   - PCA dilaudid ordered. Will add oral meds once tolerating PO diet and titrate meds as needed  - VBG PRN to watch for development of hypercapnia

## 2019-06-30 NOTE — ASSESSMENT & PLAN NOTE
- Previously on tacrolimus, MMF, and daily prednisone. S/p solumedrol in OR, basiliximab on POD#0 and POD#4.   - Continue MMF, tacrolimus, and steroid taper.   - Monitor daily tacrolimus levels and adjust dose as needed.

## 2019-06-30 NOTE — SUBJECTIVE & OBJECTIVE
Subjective:     Interval History: Right sided chest tube placed yesterday evening by CTS for pneumothorax with good results on CXR.  Otherwise no changed overnight.  Continues to progess    Continuous Infusions:   dexmedetomidine (PRECEDEX) infusion Stopped (06/30/19 1000)    HYDROmorphone PCA 0.5 mg/mL in 30mL 0.9% Sodium Chloride in a 35mL MONOject Detach Syringe      insulin (HUMAN R) infusion (adults) 0.8 Units/hr (06/26/19 1900)    nicardipine Stopped (06/24/19 0200)     Scheduled Meds:   acetylcysteine 200 mg/ml (20%)  4 mL Nebulization Q12H    ciprofloxacin  400 mg Intravenous Q8H    enoxaparin  40 mg Subcutaneous Daily    famotidine (PF)  20 mg Intravenous BID    fluconazole (DIFLUCAN) IVPB  400 mg Intravenous Q24H    ganciclovir (CYTOVENE) IVPB  5 mg/kg Intravenous Q12H    levalbuterol  1.25 mg Nebulization Q6H WAKE    lidocaine  1 patch Transdermal Q24H    lidocaine  1 patch Transdermal Q24H    methylPREDNISolone sodium succinate  20 mg Intravenous Daily    metoprolol  5 mg Intravenous Once    mycophenolate (CELLCEPT) IVPB  500 mg Intravenous BID    polyethylene glycol  17 g Per NG tube BID    sodium chloride 0.9%  10 mL Intravenous Q6H    sodium chloride 7%  4 mL Nebulization Q12H    tacrolimus  2 mg Sublingual BID    vancomycin (VANCOCIN) IVPB  750 mg Intravenous Q12H     PRN Meds:bisacodyl, Dextrose 10% Bolus, Dextrose 10% Bolus, dextrose 50%, dextrose 50%, diphenhydrAMINE, glucagon (human recombinant), glucose, glucose, HYDROmorphone, insulin aspart U-100, lactulose, levalbuterol, lipase-protease-amylase (VIOKACE) 20,880-78,300- 78,300 units, lorazepam, magnesium sulfate IVPB, metoclopramide HCl, naloxone, OLANZapine, ondansetron, ondansetron, oxyCODONE, oxyCODONE, potassium chloride in water **AND** potassium chloride in water **AND** potassium chloride in water, promethazine (PHENERGAN) IVPB, Flushing PICC Protocol **AND** sodium chloride 0.9% **AND** sodium chloride  0.9%    Review of patient's allergies indicates:   Allergen Reactions    Albuterol Palpitations    Colistin Anaphylaxis    Vancomycin analogues      Infusion reaction that does not resolve with slowing  Pt. States she can tolerate it when given with 50 mg Benadryl and ran over 3 hours    Neupogen [filgrastim] Other (See Comments)     Ostealgia after five daily doses of 300 mcg.      Bactrim [sulfamethoxazole-trimethoprim] Hives    Ceftazidime Hives     Pt stated can tolerate cefapine not ceftazidime    Ceftazidime     Dronabinol Other (See Comments)     Mental changes/hallucinations    Haldol [haloperidol lactate] Other (See Comments)     Seizure like activity    Nsaids (non-steroidal anti-inflammatory drug)      Cannot have due to lung transplant    Adhesive Rash     Cloth tape- please use tegaderm or paper tape    Aztreonam Rash    Ciprofloxacin Nausea And Vomiting     Projectile N/V, per patient.  Unwilling to retry therapy.       Review of Systems   Constitutional: Negative for appetite change, chills, diaphoresis and fever.   HENT: Negative for congestion, postnasal drip, rhinorrhea, sinus pressure and sinus pain.    Respiratory: Positive for cough and shortness of breath. Negative for wheezing.    Cardiovascular: Positive for chest pain (insertion site). Negative for palpitations and leg swelling.   Gastrointestinal: Negative for abdominal distention, diarrhea, nausea and vomiting.   Genitourinary: Negative for dysuria.   Skin: Negative for rash and wound.   Neurological: Positive for weakness. Negative for dizziness, syncope and headaches.   Psychiatric/Behavioral: The patient is nervous/anxious.      Objective:   Physical Exam   Constitutional: She appears well-developed and well-nourished. She is easily aroused. No distress. Nasal cannula in place.   HENT:   Head: Normocephalic and atraumatic.   Mouth/Throat: Oropharynx is clear and moist. No oropharyngeal exudate.   Eyes: Conjunctivae are  normal. No scleral icterus.   Neck: No JVD present.   Right IJ CVC, CDI   Cardiovascular: Regular rhythm. Tachycardia present. Exam reveals no friction rub.   No murmur heard.  Pulmonary/Chest: No accessory muscle usage. No respiratory distress. She has no decreased breath sounds. She has no wheezes. She has rhonchi (scattered). She has no rales.   Bilateral chest tubes in place   Abdominal: Soft. Bowel sounds are normal. She exhibits no distension. There is no tenderness.   Musculoskeletal: She exhibits no edema.   Neurological: She is alert and easily aroused. No cranial nerve deficit.   Skin: Skin is warm and dry. No rash noted. She is not diaphoretic. No erythema.   Psychiatric: Her mood appears anxious.   Nursing note and vitals reviewed.        Vital Signs (Most Recent):  Temp: 97.8 °F (36.6 °C) (06/30/19 1100)  Pulse: (!) 137 (06/30/19 1102)  Resp: (!) 30 (06/30/19 1102)  BP: 127/87 (06/30/19 1100)  SpO2: 97 % (06/30/19 1102) Vital Signs (24h Range):  Temp:  [97.6 °F (36.4 °C)-98.6 °F (37 °C)] 97.8 °F (36.6 °C)  Pulse:  [] 137  Resp:  [13-37] 30  SpO2:  [85 %-100 %] 97 %  BP: ()/() 127/87     Weight: 65 kg (143 lb 4.8 oz)  Body mass index is 27.08 kg/m².      Intake/Output Summary (Last 24 hours) at 6/30/2019 1116  Last data filed at 6/30/2019 1059  Gross per 24 hour   Intake 1777 ml   Output 380 ml   Net 1397 ml       Ventilator Data:          Hemodynamic Parameters:       Lines/Drains:       Introducer right internal jugular (Active)   Specific Qualities Capped 6/30/2019  7:10 AM   Dressing Status Clean;Dry;Biopatch in place;Intact 6/30/2019  7:10 AM   Dressing Intervention Dressing reinforced 6/29/2019  3:00 AM   Dressing Change Due 06/29/19 6/30/2019  3:00 AM   Daily Line Review Performed 6/30/2019  7:10 AM   Number of days:             Port A Cath Single Lumen 06/24/14 1200 right subclavian (Active)   Accessed by: Radha Lanza 6/24/2019  5:00 AM   Dressing Type Transparent 6/30/2019   7:10 AM   Dressing Status Biopatch in place;Clean;Dry;Intact 6/30/2019  7:10 AM   Dressing Intervention Dressing reinforced 6/29/2019  3:00 AM   Dressing Change Due 06/30/19 6/30/2019  3:00 AM   Line Status Infusing 6/30/2019  7:10 AM   Flush Performed Yes 6/30/2019  7:10 AM   Date to be Reflushed 06/18/19 6/29/2019  3:00 AM   Daily Line Review Performed 6/30/2019  7:10 AM   Type of Needle Nicole 6/30/2019  3:00 AM   Gauge 20 6/30/2019  3:00 AM   Needle Length 1 in 6/30/2019  3:00 AM   Needle Status Left in place 6/30/2019  3:00 AM   Needle Insertion Date 06/24/19 6/30/2019  3:00 AM   Needle Insertion Time 0500 6/26/2019  3:00 AM   Number of days: 1831            Percutaneous Central Line Insertion/Assessment - double lumen  06/18/19 0510 (Active)   Dressing biopatch in place;dressing dry and intact 6/30/2019  7:10 AM   Securement secured w/ sutures 6/30/2019  7:10 AM   Additional Site Signs no erythema;no warmth 6/30/2019  7:10 AM   Distal Patency/Care infusing 6/30/2019  7:10 AM   Proximal Patency/Care infusing 6/30/2019  7:10 AM   Waveform normal 6/30/2019  7:10 AM   Line Interventions line leveled/zeroed 6/30/2019  7:10 AM   Dressing Change Due 06/29/19 6/30/2019  3:00 AM   Daily Line Review Performed 6/30/2019  7:10 AM   Number of days: 12            Peripheral IV - Single Lumen 06/27/19 0917 22 G Right Forearm (Active)   Site Assessment Clean;Dry;Intact;No redness;No swelling 6/30/2019  7:10 AM   Line Status Infusing 6/30/2019  7:10 AM   Dressing Status Clean;Dry;Intact 6/30/2019  7:10 AM   Dressing Intervention Dressing reinforced 6/29/2019  3:00 AM   Dressing Change Due 07/01/19 6/30/2019  3:00 AM   Site Change Due 07/01/19 6/29/2019  7:00 PM   Reason Not Rotated Not due 6/30/2019  7:10 AM   Number of days: 3            Chest Tube 06/29/19 1655 1 Right Fourth intercostal space;Midaxillary 7 Fr. (Active)   Chest Tube WDL WDL 6/30/2019  7:10 AM   Function -20 cm H2O 6/30/2019  7:10 AM   Air Leak/Fluctuation air  leak present 6/30/2019  7:10 AM   Safety all tubing connections taped;2 rubber-tipped hemostats w/ patient;all connections secured;suction checked 6/30/2019  7:10 AM   Securement tubing secured to body distal to insertion site w/ tape 6/30/2019  7:10 AM   Patency Intervention Tip/tilt 6/30/2019  7:10 AM   Drainage Description Serosanguineous 6/30/2019  7:10 AM   Dressing Appearance occlusive gauze dressing intact 6/30/2019  7:10 AM   Dressing Care dressing changed 6/30/2019 10:00 AM   Left Subcutaneous Emphysema none present 6/30/2019  7:10 AM   Right Subcutaneous Emphysema none present 6/30/2019  7:10 AM   Site Assessment Clean;Dry;Intact;No redness;No swelling;Ecchymotic 6/30/2019 10:00 AM   Surrounding Skin Dry;Intact 6/30/2019  7:10 AM   Output (mL) 0 mL 6/30/2019 10:00 AM   Number of days: 0            Y Chest Tube 3 and 4 06/18/19 1151 3 Left Pleural 19 Fr. 4 Left Mediastinal 19 Fr. (Active)   Function -20 cm H2O 6/30/2019  7:10 AM   Air Leak/Fluctuation air leak not present 6/30/2019  7:10 AM   Safety all tubing connections taped;2 rubber-tipped hemostats w/ patient;all connections secured;suction checked 6/30/2019  7:10 AM   Securement tubing secured to body distal to insertion site w/ tape 6/30/2019  7:10 AM   Left Subcutaneous Emphysema none present 6/30/2019  7:10 AM   Right Subcutaneous Emphysema none present 6/30/2019  7:10 AM   Patency Intervention Tip/tilt 6/30/2019  7:10 AM   Drainage Description 3 Serosanguineous 6/30/2019  7:10 AM   Tube 3 Dressing Appearance occlusive gauze dressing intact 6/30/2019  7:10 AM   Tube 3 Dressing Care dressing changed 6/30/2019 10:00 AM   Site Assessment 3 Clean;Dry;Intact 6/30/2019 10:00 AM   Surrounding Skin 3 Dry;Intact 6/30/2019  7:10 AM   Drainage Description 4 Serosanguineous 6/30/2019  7:10 AM   Tube 4 Dressing Appearance occlusive gauze dressing intact 6/30/2019  7:10 AM   Tube 4 Dressing Care dressing changed 6/30/2019 10:00 AM   Site Assessment 4  Clean;Dry;Intact 6/30/2019 10:00 AM   Surrounding Skin 4 Dry;Intact 6/30/2019  7:10 AM   Output (mL) 0 mL 6/30/2019 10:00 AM   Number of days: 11       Significant Labs:  CBC:  Recent Labs   Lab 06/30/19  0500   WBC 9.89   RBC 2.71*   HGB 7.6*   HCT 24.4*   *   MCV 90   MCH 28.0   MCHC 31.1*     BMP:  Recent Labs   Lab 06/30/19  0500   *   K 4.6      CO2 18*   BUN 24*   CREATININE 0.8   CALCIUM 8.2*      Tacrolimus Levels:  Recent Labs   Lab 06/30/19  0500   TACROLIMUS 14.3     Microbiology:  Microbiology Results (last 7 days)     Procedure Component Value Units Date/Time    Fungus culture [[id number]]  (Abnormal) Collected:  06/18/19 0930    Order Status:  Completed Specimen:  Tissue from Lung, Left Updated:  06/27/19 1055     Fungus (Mycology) Culture CANDIDA ALBICANS  Moderate      Narrative:       Donor lung    Fungus culture [[id number]]  (Abnormal) Collected:  06/23/19 1735    Order Status:  Completed Specimen:  Respiratory from Bronchial Wash Updated:  06/27/19 1048     Fungus (Mycology) Culture BRANDI GLABRATA  Few      Culture, Respiratory [[id number]]  (Susceptibility) Collected:  06/25/19 0900    Order Status:  Completed Specimen:  Respiratory from Bronchial Wash Updated:  06/27/19 0905     Respiratory Culture No S aureus isolated.      PSEUDOMONAS AERUGINOSA   Few       Gram Stain (Respiratory) <10 epithelial cells per low power field.     Gram Stain (Respiratory) Many WBC's     Gram Stain (Respiratory) No organisms seen    Narrative:       Bronchial Wash    AFB Culture & Smear [[id number]] Collected:  06/25/19 0900    Order Status:  Completed Specimen:  Respiratory from Bronchial Wash Updated:  06/26/19 2127     AFB Culture & Smear Culture in progress     AFB CULTURE STAIN No acid fast bacilli seen.    Narrative:       Bronchial Wash    Fungus culture [[id number]] Collected:  06/25/19 0900    Order Status:  Completed Specimen:  Respiratory from Bronchial Wash Updated:  06/26/19 1102      Fungus (Mycology) Culture Culture in progress    Narrative:       Bronchial Wash    Fungus culture [[id number]] Collected:  06/20/19 0808    Order Status:  Completed Specimen:  Respiratory from Bronchial Wash Updated:  06/26/19 1101     Fungus (Mycology) Culture Culture in progress    Culture, Respiratory with Gram Stain [[id number]]  (Susceptibility) Collected:  06/23/19 1735    Order Status:  Completed Specimen:  Respiratory from Bronchial Wash Updated:  06/26/19 0752     Respiratory Culture No S aureus isolated.      PSEUDOMONAS AERUGINOSA   Few  Normal respiratory anibal also present       Gram Stain (Respiratory) <10 epithelial cells per low power field.     Gram Stain (Respiratory) Few WBC's     Gram Stain (Respiratory) Rare Gram negative rods    AFB Culture & Smear [[id number]] Collected:  06/23/19 1735    Order Status:  Completed Specimen:  Respiratory from Bronchial Wash Updated:  06/24/19 2127     AFB Culture & Smear Culture in progress     AFB CULTURE STAIN No acid fast bacilli seen.    Culture, Anaerobe [[id number]] Collected:  06/18/19 0930    Order Status:  Completed Specimen:  Tissue from Lung, Left Updated:  06/24/19 1107     Anaerobic Culture No anaerobes isolated    Narrative:       Donor lung          I have reviewed all pertinent labs within the past 24 hours.    Diagnostic Results:  Chest X-Ray: There has been interval placement of right pleural drainage catheter.  There has been near complete re-expansion of the right lung, small right apical pneumothorax remains.  There appears to have been interval improvement in the left pneumothorax.  No significant detrimental change,

## 2019-06-30 NOTE — PROGRESS NOTES
"[facility]  Endocrinology  Progress Note    Admit Date: 6/17/2019     Reason for Consult: Management of  Hyperglycemia and CF related pancreatic insufficiency.     Surgical Procedure and Date: lung transplant in 2014; 6/18/19    HPI:   Patient is a 29 y.o. female with a diagnosis of CF, HTN, migraine headache, and osteopenia. 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 No personal history of DM. Endocrinology consulted for BG management.     Lab Results   Component Value Date    HGBA1C 4.7 01/09/2019                 Interval HPI:   Overnight events: Remains in ICU, tachycardia noted.  Chest tube inserted yesterday. BG elevated during the day but well controlled overnight on transition IV insulin infusion at 0.8 u/hr plus correction scale.  Solu medrol 20 mg IV daily; steroid taper per primary.  On IV antibiotics.  May start TF per primary if patient agrees to NGT placement.  Eating:   NPO  Nausea: No  Hypoglycemia and intervention: No  Fever: No  TPN and/or TF: No    BP (!) 84/55 (BP Location: Left arm, Patient Position: Lying)   Pulse 82   Temp 97.6 °F (36.4 °C) (Axillary)   Resp 16   Ht 5' 1" (1.549 m)   Wt 65 kg (143 lb 4.8 oz)   LMP 10/02/2016   SpO2 100%   Breastfeeding? No   BMI 27.08 kg/m²      Labs Reviewed and Include    Recent Labs   Lab 06/30/19  0500   *   CALCIUM 8.2*   ALBUMIN 2.1*   PROT 5.1*   *   K 4.6   CO2 18*      BUN 24*   CREATININE 0.8   ALKPHOS 221*   *   AST 43*   BILITOT 0.4     Lab Results   Component Value Date    WBC 9.89 06/30/2019    HGB 7.6 (L) 06/30/2019    HCT 24.4 (L) 06/30/2019    MCV 90 06/30/2019     (L) 06/30/2019     No results for input(s): TSH, FREET4 in the last 168 hours.  Lab Results   Component Value Date    HGBA1C 4.7 01/09/2019       Nutritional status:   Body mass index is 27.08 kg/m².  Lab Results   Component Value Date    ALBUMIN 2.1 (L) 06/30/2019    " ALBUMIN 2.2 (L) 06/29/2019    ALBUMIN 2.5 (L) 06/28/2019     Lab Results   Component Value Date    PREALBUMIN 15 (L) 05/29/2014    PREALBUMIN 11 (L) 05/09/2014    PREALBUMIN 18 (L) 03/19/2014       Estimated Creatinine Clearance: 88.8 mL/min (based on SCr of 0.8 mg/dL).    Accu-Checks  Recent Labs     06/28/19  1555 06/28/19  1958 06/29/19  0018 06/29/19  0419 06/29/19  0822 06/29/19  1259 06/29/19  1555 06/29/19  2344 06/30/19  0500 06/30/19  0904   POCTGLUCOSE 329* 212* 124* 210* 153* 250* 357* 99 155* 175*       Current Medications and/or Treatments Impacting Glycemic Control  Immunotherapy:    Immunosuppressants         Stop Route Frequency     tacrolimus capsule 2 mg      -- SL 2 times daily     mycophenolate (CELLCEPT) 500 mg in dextrose 5 % 100 mL IVPB      -- IV 2 times daily        Steroids:   Hormones (From admission, onward)    Start     Stop Route Frequency Ordered    06/27/19 0945  methylPREDNISolone sodium succinate injection 20 mg      -- IV Daily 06/27/19 0831    06/26/19 1827  dexAMETHasone sodium phos (PF) 10 mg/mL injection     Note to Pharmacy:  Created by cabinet override    06/27 0629   06/26/19 1827        Pressors:    Autonomic Drugs (From admission, onward)    None        Hyperglycemia/Diabetes Medications:   Antihyperglycemics (From admission, onward)    Start     Stop Route Frequency Ordered    06/28/19 1502  insulin aspart U-100 pen 0-10 Units      -- SubQ As needed (PRN) 06/28/19 1403    06/20/19 1545  insulin regular (Humulin R) 100 Units in sodium chloride 0.9% 100 mL infusion      -- IV Continuous 06/20/19 1442          ASSESSMENT and PLAN    * S/P lung transplant  Managed per LUT.   avoid hypoglycemia        Hyperglycemia  BG goal 140 - 180.     Transition IV insulin infusion at 0.8 u/hr  Moderate dose correction scale  BG monitoring every 4 hours while NPO    Discharge planning: TBD    CF related Pancreatic insufficiency  May impact BG.       Adrenal cortical steroids causing  adverse effect in therapeutic use  Glucocorticoids markedly increase blood glucose. Expect the steroid taper will help glucose control.         Prophylactic immunotherapy  May increase insulin resistance.           Deny Swenson NP  Endocrinology  [facility]

## 2019-06-30 NOTE — SUBJECTIVE & OBJECTIVE
"Interval HPI:   Overnight events: Remains in ICU, tachycardia noted.  Chest tube inserted yesterday. BG elevated during the day but well controlled overnight on transition IV insulin infusion at 0.8 u/hr plus correction scale.  Solu medrol 20 mg IV daily; steroid taper per primary.  On IV antibiotics.  May start TF per primary if patient agrees to NGT placement.  Eating:   NPO  Nausea: No  Hypoglycemia and intervention: No  Fever: No  TPN and/or TF: No    BP (!) 84/55 (BP Location: Left arm, Patient Position: Lying)   Pulse 82   Temp 97.6 °F (36.4 °C) (Axillary)   Resp 16   Ht 5' 1" (1.549 m)   Wt 65 kg (143 lb 4.8 oz)   LMP 10/02/2016   SpO2 100%   Breastfeeding? No   BMI 27.08 kg/m²     Labs Reviewed and Include    Recent Labs   Lab 06/30/19  0500   *   CALCIUM 8.2*   ALBUMIN 2.1*   PROT 5.1*   *   K 4.6   CO2 18*      BUN 24*   CREATININE 0.8   ALKPHOS 221*   *   AST 43*   BILITOT 0.4     Lab Results   Component Value Date    WBC 9.89 06/30/2019    HGB 7.6 (L) 06/30/2019    HCT 24.4 (L) 06/30/2019    MCV 90 06/30/2019     (L) 06/30/2019     No results for input(s): TSH, FREET4 in the last 168 hours.  Lab Results   Component Value Date    HGBA1C 4.7 01/09/2019       Nutritional status:   Body mass index is 27.08 kg/m².  Lab Results   Component Value Date    ALBUMIN 2.1 (L) 06/30/2019    ALBUMIN 2.2 (L) 06/29/2019    ALBUMIN 2.5 (L) 06/28/2019     Lab Results   Component Value Date    PREALBUMIN 15 (L) 05/29/2014    PREALBUMIN 11 (L) 05/09/2014    PREALBUMIN 18 (L) 03/19/2014       Estimated Creatinine Clearance: 88.8 mL/min (based on SCr of 0.8 mg/dL).    Accu-Checks  Recent Labs     06/28/19  1555 06/28/19  1958 06/29/19  0018 06/29/19  0419 06/29/19  0822 06/29/19  1259 06/29/19  1555 06/29/19  2344 06/30/19  0500 06/30/19  0904   POCTGLUCOSE 329* 212* 124* 210* 153* 250* 357* 99 155* 175*       Current Medications and/or Treatments Impacting Glycemic " Control  Immunotherapy:    Immunosuppressants         Stop Route Frequency     tacrolimus capsule 2 mg      -- SL 2 times daily     mycophenolate (CELLCEPT) 500 mg in dextrose 5 % 100 mL IVPB      -- IV 2 times daily        Steroids:   Hormones (From admission, onward)    Start     Stop Route Frequency Ordered    06/27/19 0945  methylPREDNISolone sodium succinate injection 20 mg      -- IV Daily 06/27/19 0831    06/26/19 1827  dexAMETHasone sodium phos (PF) 10 mg/mL injection     Note to Pharmacy:  Created by cabinet override    06/27 0629 06/26/19 1827        Pressors:    Autonomic Drugs (From admission, onward)    None        Hyperglycemia/Diabetes Medications:   Antihyperglycemics (From admission, onward)    Start     Stop Route Frequency Ordered    06/28/19 1502  insulin aspart U-100 pen 0-10 Units      -- SubQ As needed (PRN) 06/28/19 1403    06/20/19 1545  insulin regular (Humulin R) 100 Units in sodium chloride 0.9% 100 mL infusion      -- IV Continuous 06/20/19 9215

## 2019-06-30 NOTE — ANESTHESIA POST-OP PAIN MANAGEMENT
Acute Pain Service Progress Note    Juanita Ibarra is a 29 y.o., female, [id number].    Juanita Ibarra is a 29 y.o., female, [id number].     Surgery:  REDO BILATERAL LUNG TRANSPLANT     Post Op Day #: 11     Catheter type: None, bilateral ESPs removed 6/27/19    Problem List:    Active Hospital Problems    Diagnosis  POA    *S/P lung transplant [Z94.2]  Not Applicable    On enteral nutrition [Z78.9]  No    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 [A49.8, Z16.19]  Unknown    Acute hyperactive delirium due to another medical condition [F05]  Unknown    Prophylactic immunotherapy [Z29.8]  Not Applicable    Acute blood loss anemia [D62]  No    Thrombocytopenia, unspecified [D69.6]  No    Steroid-induced hyperglycemia [R73.9, T38.0X5A]  Yes    Prophylactic antibiotic [Z79.2]  Not Applicable    Constipation [K59.00]  Yes    Immunosuppression [D89.9]  Yes    Hyperglycemia [R73.9]  Yes    Adrenal cortical steroids causing adverse effect in therapeutic use [T38.0X5A]  No    CF related Pancreatic insufficiency [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 Patient resting comfortable in bed this AM. Anxiety still a significant issue per Nursing  Objective:        Vitals   Vitals:    06/30/19 0800   BP: (!) 84/55   Pulse: 83   Resp: 14   Temp:         Labs    Admission on 06/17/2019   No results displayed because visit has over 200 results.           Meds   Current Facility-Administered Medications   Medication Dose Route Frequency Provider Last Rate Last Dose    acetylcysteine 200 mg/ml (20%)  solution 4 mL  4 mL Nebulization Q12H Jake Alvarez MD   4 mL at 06/29/19 0846    bisacodyl suppository 10 mg  10 mg Rectal Daily PRN Shama Canales PA-C   10 mg at 06/28/19 0941    ciprofloxacin (CIPRO)400mg/200ml D5W IVPB 400 mg  400 mg Intravenous Q8H Jacqueline Dumont PA-C 200 mL/hr at 06/30/19 0515 400 mg at 06/30/19 0515    dexmedetomidine (PRECEDEX) 400mcg/100mL 0.9% NaCL infusion  0.2 mcg/kg/hr Intravenous Continuous Shama Canales PA-C 1.3 mL/hr at 06/30/19 0800 0.08 mcg/kg/hr at 06/30/19 0800    dextrose 10% (D10W) Bolus  12.5 g Intravenous PRN Jonathan Newby MD 1,000 mL/hr at 06/19/19 0232 125 mL at 06/19/19 0232    dextrose 10% (D10W) Bolus  25 g Intravenous PRN Jonathan Newby MD        dextrose 5 % and 0.45 % NaCl with KCl 20 mEq infusion   Intravenous Continuous Jacqueline Douglass MD 5 mL/hr at 06/30/19 0800      dextrose 50% injection 12.5 g  12.5 g Intravenous PRN Amy Zapata, NP        dextrose 50% injection 25 g  25 g Intravenous PRN Amy Zapata, KUMAR        diphenhydrAMINE injection 50 mg  50 mg Intravenous On Call Procedure Francisca Morin, DO   50 mg at 06/30/19 0454    enoxaparin injection 40 mg  40 mg Subcutaneous Daily Jacqueline Dumont PA-C   40 mg at 06/29/19 1632    famotidine (PF) injection 20 mg  20 mg Intravenous BID Jacqueline Dumont PA-C   20 mg at 06/29/19 2100    fluconazole (DIFLUCAN) IVPB 400 mg 200 mL  400 mg Intravenous Q24H Shama Canales PA-C 100 mL/hr at 06/29/19 0809 400 mg at 06/29/19 0809    ganciclovir (CYTOVENE) 295 mg in sodium chloride 0.9% 100 mL IVPB  5 mg/kg Intravenous Q12H Jacqueline Douglass  mL/hr at 06/29/19 2159 295 mg at 06/29/19 2159    glucagon (human recombinant) injection 1 mg  1 mg Intramuscular PRN Amy Zapata NP        glucose chewable tablet 16 g  16 g Oral PRN Amy Zapata NP        glucose chewable tablet 24 g  24 g Oral PRN Amy Zapata NP        hydromorphone (PF)  injection 2 mg  2 mg Intravenous Q1H PRN Jhoan Antunez MD   2 mg at 06/30/19 0515    HYDROmorphone PCA 0.5 mg/mL in 30mL 0.9% Sodium Chloride in a 35mL MONOject Detach Syringe   Intravenous Continuous Farhan H MD Karen        insulin aspart U-100 pen 0-10 Units  0-10 Units Subcutaneous PRN Deny Swenson NP   8 Units at 06/29/19 1555    insulin regular (Humulin R) 100 Units in sodium chloride 0.9% 100 mL infusion  0.8 Units/hr Intravenous Continuous Deny Swenson NP 0.8 mL/hr at 06/26/19 1900 0.8 Units/hr at 06/26/19 1900    lactulose 20 gram/30 mL solution Soln 20 g  20 g Oral Q6H PRN Angelica Gomez MD        levalbuterol nebulizer solution 1.25 mg  1.25 mg Nebulization Q4H PRN Jacqueline Dumont PA-C   1.25 mg at 06/30/19 0545    levalbuterol nebulizer solution 1.25 mg  1.25 mg Nebulization Q6H WAKE Jacqueline Dumont PA-C   1.25 mg at 06/29/19 2027    lidocaine 5 % patch 1 patch  1 patch Transdermal Q24H Farhan H MD Karen   Stopped at 06/29/19 1245    lidocaine 5 % patch 1 patch  1 patch Transdermal Q24H Farhan H MD Karen   Stopped at 06/29/19 1245    lipase-protease-amylase (VIOKACE) 20,880-78,300- 78,300 units per tablet 1 tablet  1 tablet Oral Q3H PRN Francisca Morin DO   1 tablet at 06/24/19 1700    lorazepam (ATIVAN) injection 2 mg  2 mg Intravenous Q4H PRN Jacqueline Dumont PA-C   2 mg at 06/30/19 0525    magnesium sulfate 2g in water 50mL IVPB (premix)  2 g Intravenous PRN Angelica Gomez MD   2 g at 06/29/19 0520    methylPREDNISolone sodium succinate injection 20 mg  20 mg Intravenous Daily Jake Alvarez MD   20 mg at 06/29/19 0811    metoclopramide HCl injection 5 mg  5 mg Intravenous Q6H PRN Jacqueline Douglass MD   5 mg at 06/29/19 1400    metoprolol injection 5 mg  5 mg Intravenous Once Reyes Will NP        mycophenolate (CELLCEPT) 500 mg in dextrose 5 % 100 mL IVPB  500 mg Intravenous BID Shama Canales PA-C 50 mL/hr at 06/29/19 6819  500 mg at 06/29/19 1703    naloxone 0.4 mg/mL injection 0.02 mg  0.02 mg Intravenous PRN Riley Pennington MD        niCARdipine 40 mg/200 mL infusion  1 mg/hr Intravenous Continuous Harleen Riley MD   Stopped at 06/24/19 0200    OLANZapine injection 10 mg  10 mg Intramuscular BID PRN Jacqueline Dumont PA-C   10 mg at 06/28/19 0339    ondansetron disintegrating tablet 8 mg  8 mg Oral Q8H PRN Jacqueline Douglass MD        ondansetron injection 4 mg  4 mg Intravenous Q6H PRN Shama Canales PA-C   4 mg at 06/29/19 1143    oxyCODONE immediate release tablet 10 mg  10 mg Oral Q4H PRN Jacqueline Douglass MD        oxyCODONE immediate release tablet 5 mg  5 mg Oral Q4H PRN Jacqueline Douglass MD        polyethylene glycol packet 17 g  17 g Per NG tube BID Jacqueline Dumont PA-C   Stopped at 06/29/19 0900    potassium chloride 20 mEq in 100 mL IVPB (FOR CENTRAL LINE ADMINISTRATION ONLY)  20 mEq Intravenous PRN Jacqueline Douglass MD 50 mL/hr at 06/22/19 0647 20 mEq at 06/22/19 0647    And    potassium chloride 40 mEq in 100 mL IVPB (FOR CENTRAL LINE ADMINISTRATION ONLY)  40 mEq Intravenous PRN Jacqueline Douglass MD 25 mL/hr at 06/18/19 1749 40 mEq at 06/18/19 1749    And    potassium chloride 20 mEq in 100 mL IVPB (FOR CENTRAL LINE ADMINISTRATION ONLY)  60 mEq Intravenous PRN Jacqueline Douglass MD        promethazine (PHENERGAN) 6.25 mg in dextrose 5 % 50 mL IVPB  6.25 mg Intravenous Q6H PRN Chai Glass MD        sodium chloride 0.9% flush 10 mL  10 mL Intravenous Q6H Jake Alvarez MD   10 mL at 06/30/19 0505    And    sodium chloride 0.9% flush 10 mL  10 mL Intravenous PRN Jake Alvarez MD        sodium chloride 7% nebulizer solution 4 mL  4 mL Nebulization Q12H Jake Alvarez MD   4 mL at 06/29/19 0846    tacrolimus capsule 2 mg  2 mg Sublingual BID Jake Rampolla, MD   2 mg at 06/29/19 1703    vancomycin 750 mg in dextrose 5 % 250 mL IVPB (ready to mix system)  750 mg  Intravenous Q12H Francisca Cuevasnellabrook,  166.7 mL/hr at 06/30/19 0525 750 mg at 06/30/19 0525      Anticoagulant dose: none.     Assessment:                 Pain control adequate     Plan:     Patient now s/p bilateral lung transplant on 06/18/19; Hx of significant opioid use post previous lung transplant approximately five years ago. Patient with significant opioid requirement prior to surgery from previous lung transplant. Remained intubated s/p lung transplant, requiring fentanyl with dexmedetomidine at 1.4mcg/kg/hr and propofol 50mcg/kg/min for sedation. S/P bilateral TARA catheter placement 06/19/19 w/ hand boluses Q12H at 0700 and 1900. Patient placed on ketamine infusion on 6/20 at 10 mcg/kg/min (titrated up to 25 mcg/kg/min), extubated on 6/21 but re-intubated for respiratory distress, agitation, delirium. Ketamine discontinued. Restarted low dose non-titrating 5 mcg/kg/min on 6/24/19. Extubated 6/26/19 to HFNC. Bilateral TARA catheters pulled on 6/27/19. MBSS failed 6/28. Ketamine stopped 6/28/19.     - Ketamine stopped 6/28/19  - Dilaudid PCA reduced to 0.3mg basal, 0.2 mg q6min, 2.3mg/hr max  - Pain better controlled. Patient resting comfortably in bed this AM. Will plan to transition to PO pain medications if patient passes swallow study.   -  Defer treatment of anxiety to primary team       Evaluator Maricel Porter    I have seen the patient, reviewed the Resident's assessment and plan. I have personally interviewed and examined the patient at bedside and: agree with the findings.

## 2019-06-30 NOTE — ASSESSMENT & PLAN NOTE
- Likely multifactorial including post-op anesthesia, dissociative narcotics, ICU delirium  - Continue Ativan PRN, Dilaudid PCA.  Currently on Precedex  - Continue Precedex and wean as tolerated  -Patient refusing NGT replacement.

## 2019-06-30 NOTE — PROCEDURES
"Juanita Ibarra is a 29 y.o. female patient.    Temp: 98.6 °F (37 °C) (19 1500)  Pulse: (!) 128 (19)  Resp: (!) 30 (19)  BP: 124/80 (19 1900)  SpO2: 100 % (19)  Weight: 64.2 kg (141 lb 8.6 oz) (19 0426)  Height: 5' 1" (154.9 cm) (19 2330)       Chest Tube Insertion  Date/Time: 2019 9:16 PM  Performed by: Jacqueline Douglass MD  Authorized by: Jacqueline Douglass MD   Post-operative diagnosis: Pneumothorax  Pre-operative diagnosis: Pneumothorax  Consent Done: Yes  Consent: Verbal consent obtained. Written consent obtained.  Risks and benefits: risks, benefits and alternatives were discussed  Consent given by: patient  Patient understanding: patient states understanding of the procedure being performed  Patient consent: the patient's understanding of the procedure matches consent given  Procedure consent: procedure consent matches procedure scheduled  Relevant documents: relevant documents present and verified  Test results: test results available and properly labeled  Site marked: the operative site was marked  Imaging studies: imaging studies available  Required items: required blood products, implants, devices, and special equipment available  Patient identity confirmed: , MRN and name  Time out: Immediately prior to procedure a "time out" was called to verify the correct patient, procedure, equipment, support staff and site/side marked as required.  Indications: pneumothorax  Patient sedated: no  Anesthesia: local infiltration    Anesthesia:  Local Anesthetic: lidocaine 1% without epinephrine  Anesthetic total: 5 mL  Preparation: skin prepped with ChloraPrep  Placement location: right lateral  Tube size: 8 Ethiopian  Ultrasound guidance: no  Tension pneumothorax heard: no  Tube connected to: suction  Drainage characteristics: serosanguinous  Drainage amount: 2 ml  Suture material: 2-0 silk  Dressing: 4x4 sterile gauze and petrolatum-impregnated " gauze  Post-insertion x-ray findings: tube in good position  Patient tolerance: Patient tolerated the procedure well with no immediate complications  Complications: No  Specimens: No  Implants: No          Jacqueline Douglass  6/29/2019

## 2019-06-30 NOTE — ASSESSMENT & PLAN NOTE
POD#8 s/p bilateral lung transplant (re-transplant) for CARLOS EDUARDO. 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 Remains PGD 0. S/p bronch and extubated to HFNC on POD#2.  CTS to manage chest tubes.   - now extubated to NC and doing well. Continue Xopenex nebs Q6 hrs.  - Continue immunosuppression and ppx per protocol.   -Continue PT/OT/IS. Encourage coughing to clear secretions  -Right sided chest tube placed on 6/29 by CTS for pneumothorax with complete reexpansion noted on follow up CXR  -Will attempt to wean off Precedex in order to transfer to TSU  -Patient refusing NGT placement

## 2019-07-01 PROBLEM — D69.6 THROMBOCYTOPENIA, UNSPECIFIED: Status: RESOLVED | Noted: 2018-01-01 | Resolved: 2019-01-01

## 2019-07-01 PROBLEM — F05 ACUTE HYPERACTIVE DELIRIUM DUE TO ANOTHER MEDICAL CONDITION: Status: RESOLVED | Noted: 2019-01-01 | Resolved: 2019-01-01

## 2019-07-01 NOTE — ASSESSMENT & PLAN NOTE
CMV D-/R+. Continue OIP with ganciclovir, fluconazole, and dapsone. Bronch wash 6/20 and 6/23 with  Pseudomonas and Candida Glabrata. Continue vancomycin given history of MRSA in April pre-transplant. Continue ciprofloxacin 400 mg Q8hrs.

## 2019-07-01 NOTE — PROGRESS NOTES
Patient pain regimen change to PO with IV for breakthrough; however, patient requesting changing morphine to diluadid as morphine does not help as much. Equivalent dose of dilaudid calculated and changed in orders. Will continue to follow.    Heather Beltrán MD  Anesthesia

## 2019-07-01 NOTE — PLAN OF CARE
Problem: SLP Goal  Goal: SLP Goal  Speech Language Pathology Goals  Goals expected to be met by 7/4:  1. Patient will participate in ongoing swallow assessment.   2. When determined by SLP, patient may benefit from a repeat modified barium swallow study to further objectively assess swallow function/safety.        Goals remain appropriate.  ST recommending medications crushed in puree.   Emily P. Abadie M.S., CCC-SLP  Speech Language Pathologist  [phone]  07/01/2019

## 2019-07-01 NOTE — ASSESSMENT & PLAN NOTE
Received 6u PRBCs, 2u plts, 1 cryo, and 4 FFP angie-operatively. Will be conservative with future transfusions. Repeat CBC if chest tube has copious bloody output.

## 2019-07-01 NOTE — PLAN OF CARE
Problem: Occupational Therapy Goal  Goal: Occupational Therapy Goal  Goals to be met by: 7/4/19     Patient will increase functional independence with ADLs by performing:    UE Dressing with Supervision.  LE Dressing with Supervision.  Grooming while standing at sink with Supervision.  Toileting from toilet with Supervision for hygiene and clothing management.   Toilet transfer to toilet with Supervision.     Outcome: Ongoing (interventions implemented as appropriate)  The above goals remain appropriate. LUCIA Foy  7/1/2019

## 2019-07-01 NOTE — PROGRESS NOTES
[facility]  Lung Transplant  Progress Note - Critical Care    Patient Name: Juanita Ibarra  MRN: [id number]  Admission Date: 6/17/2019  Hospital Length of Stay: 13 days  Post-Operative Day: 1845 (Lung), 13 (Lung)  Attending Physician: Jake Alvarez MD  Primary Care Provider: Primary Doctor No     Subjective:     Interval History: No acute events overnight. Patient OOBTC on room air. Non-compliant with IS. Pain well controlled on current therapy. Transfer to TSU.     Continuous Infusions:   insulin (HUMAN R) infusion (adults)       Scheduled Meds:   acetaminophen  650 mg Oral Q6H    acetylcysteine 200 mg/ml (20%)  4 mL Nebulization Q12H    ciprofloxacin  400 mg Intravenous Q8H    enoxaparin  40 mg Subcutaneous Daily    famotidine  20 mg Oral BID    [START ON 7/2/2019] fluconazole  400 mg Oral Daily    gabapentin  300 mg Oral TID    ganciclovir (CYTOVENE) IVPB  5 mg/kg Intravenous Q12H    levalbuterol  1.25 mg Nebulization Q6H WAKE    lidocaine  1 patch Transdermal Q24H    lidocaine  1 patch Transdermal Q24H    metoprolol tartrate  25 mg Oral TID    morphine  15 mg Oral Q12H    mycophenolate (CELLCEPT) IVPB  500 mg Intravenous BID    polyethylene glycol  17 g Per NG tube BID    [START ON 7/2/2019] predniSONE  20 mg Oral Daily    QUEtiapine  25 mg Oral Daily    QUEtiapine  50 mg Oral QHS    sodium chloride 7%  4 mL Nebulization Q12H    vancomycin (VANCOCIN) IVPB  750 mg Intravenous Q12H     PRN Meds:bisacodyl, Dextrose 10% Bolus, Dextrose 10% Bolus, dextrose 50%, dextrose 50%, diphenhydrAMINE, glucagon (human recombinant), glucose, glucose, HYDROmorphone, insulin aspart U-100, lactulose, levalbuterol, lipase-protease-amylase (VIOKACE) 20,880-78,300- 78,300 units, lorazepam, magnesium sulfate IVPB, metoclopramide HCl, metoprolol, morphine, naloxone, OLANZapine, ondansetron, ondansetron, oxyCODONE, oxyCODONE, potassium chloride in water **AND** potassium chloride in  water **AND** potassium chloride in water, promethazine (PHENERGAN) IVPB    Review of patient's allergies indicates:   Allergen Reactions    Albuterol Palpitations    Colistin Anaphylaxis    Vancomycin analogues      Infusion reaction that does not resolve with slowing  Pt. States she can tolerate it when given with 50 mg Benadryl and ran over 3 hours    Neupogen [filgrastim] Other (See Comments)     Ostealgia after five daily doses of 300 mcg.      Bactrim [sulfamethoxazole-trimethoprim] Hives    Ceftazidime Hives     Pt stated can tolerate cefapine not ceftazidime    Ceftazidime     Dronabinol Other (See Comments)     Mental changes/hallucinations    Haldol [haloperidol lactate] Other (See Comments)     Seizure like activity    Nsaids (non-steroidal anti-inflammatory drug)      Cannot have due to lung transplant    Adhesive Rash     Cloth tape- please use tegaderm or paper tape    Aztreonam Rash    Ciprofloxacin Nausea And Vomiting     Projectile N/V, per patient.  Unwilling to retry therapy.       Review of Systems   Constitutional: Negative for appetite change, chills, diaphoresis and fever.   HENT: Negative for congestion, postnasal drip, rhinorrhea, sinus pressure and sinus pain.    Respiratory: Positive for cough and shortness of breath. Negative for wheezing.    Cardiovascular: Positive for chest pain (insertion site). Negative for palpitations and leg swelling.   Gastrointestinal: Negative for abdominal distention, diarrhea, nausea and vomiting.   Genitourinary: Negative for decreased urine volume, difficulty urinating, dysuria and urgency.   Musculoskeletal: Positive for back pain (chronic).   Skin: Negative for rash and wound.   Allergic/Immunologic: Positive for immunocompromised state.   Neurological: Positive for weakness. Negative for dizziness, syncope and headaches.   Psychiatric/Behavioral: Negative for confusion. The patient is nervous/anxious.      Objective:   Physical Exam    Constitutional: She appears well-developed and well-nourished. She is easily aroused. No distress.   HENT:   Head: Normocephalic and atraumatic.   Nose: Nose normal.   Eyes: Conjunctivae and EOM are normal. No scleral icterus.   Neck: No JVD present.   Right IJ CVC, CDI   Cardiovascular: Regular rhythm. Tachycardia present. Exam reveals no friction rub.   No murmur heard.  Pulmonary/Chest: No accessory muscle usage. No respiratory distress. She has decreased breath sounds in the right lower field and the left lower field. She has no wheezes. She has no rhonchi. She has no rales.   Bilateral chest tubes to suction, no air leak   Abdominal: Soft. Bowel sounds are normal. She exhibits no distension. There is no tenderness.   Musculoskeletal: She exhibits no edema.   Neurological: She is alert and easily aroused. No cranial nerve deficit.   Skin: Skin is warm and dry. No rash noted. She is not diaphoretic. No erythema.   Psychiatric: Her mood appears anxious.   Nursing note and vitals reviewed.        Vital Signs (Most Recent):  Temp: 97.8 °F (36.6 °C) (07/01/19 0700)  Pulse: (!) 138 (07/01/19 1106)  Resp: (!) 21 (07/01/19 1106)  BP: 118/89 (07/01/19 0800)  SpO2: 97 % (07/01/19 1106) Vital Signs (24h Range):  Temp:  [97.8 °F (36.6 °C)-98.8 °F (37.1 °C)] 97.8 °F (36.6 °C)  Pulse:  [109-138] 138  Resp:  [13-37] 21  SpO2:  [93 %-100 %] 97 %  BP: (116-157)/() 118/89     Weight: 65.7 kg (144 lb 13.5 oz)  Body mass index is 27.37 kg/m².      Intake/Output Summary (Last 24 hours) at 7/1/2019 1330  Last data filed at 7/1/2019 0700  Gross per 24 hour   Intake 2167 ml   Output 1370 ml   Net 797 ml       Ventilator Data:          Hemodynamic Parameters:       Lines/Drains:       Introducer right internal jugular (Active)   Specific Qualities Capped 6/30/2019  7:10 AM   Dressing Status Clean;Dry;Biopatch in place;Intact 6/30/2019  7:10 AM   Dressing Intervention Dressing reinforced 6/29/2019  3:00 AM   Dressing Change Due  06/29/19 6/30/2019  3:00 AM   Daily Line Review Performed 6/30/2019  7:10 AM   Number of days:             Port A Cath Single Lumen 06/24/14 1200 right subclavian (Active)   Accessed by: Radha Lanza 6/24/2019  5:00 AM   Dressing Type Transparent 6/30/2019  7:10 AM   Dressing Status Biopatch in place;Clean;Dry;Intact 6/30/2019  7:10 AM   Dressing Intervention Dressing reinforced 6/29/2019  3:00 AM   Dressing Change Due 06/30/19 6/30/2019  3:00 AM   Line Status Infusing 6/30/2019  7:10 AM   Flush Performed Yes 6/30/2019  7:10 AM   Date to be Reflushed 06/18/19 6/29/2019  3:00 AM   Daily Line Review Performed 6/30/2019  7:10 AM   Type of Needle Nicole 6/30/2019  3:00 AM   Gauge 20 6/30/2019  3:00 AM   Needle Length 1 in 6/30/2019  3:00 AM   Needle Status Left in place 6/30/2019  3:00 AM   Needle Insertion Date 06/24/19 6/30/2019  3:00 AM   Needle Insertion Time 0500 6/26/2019  3:00 AM   Number of days: 1831            Percutaneous Central Line Insertion/Assessment - double lumen  06/18/19 0510 (Active)   Dressing biopatch in place;dressing dry and intact 6/30/2019  7:10 AM   Securement secured w/ sutures 6/30/2019  7:10 AM   Additional Site Signs no erythema;no warmth 6/30/2019  7:10 AM   Distal Patency/Care infusing 6/30/2019  7:10 AM   Proximal Patency/Care infusing 6/30/2019  7:10 AM   Waveform normal 6/30/2019  7:10 AM   Line Interventions line leveled/zeroed 6/30/2019  7:10 AM   Dressing Change Due 06/29/19 6/30/2019  3:00 AM   Daily Line Review Performed 6/30/2019  7:10 AM   Number of days: 12            Peripheral IV - Single Lumen 06/27/19 0917 22 G Right Forearm (Active)   Site Assessment Clean;Dry;Intact;No redness;No swelling 6/30/2019  7:10 AM   Line Status Infusing 6/30/2019  7:10 AM   Dressing Status Clean;Dry;Intact 6/30/2019  7:10 AM   Dressing Intervention Dressing reinforced 6/29/2019  3:00 AM   Dressing Change Due 07/01/19 6/30/2019  3:00 AM   Site Change Due 07/01/19 6/29/2019  7:00 PM   Reason  Not Rotated Not due 6/30/2019  7:10 AM   Number of days: 3            Chest Tube 06/29/19 1655 1 Right Fourth intercostal space;Midaxillary 7 Fr. (Active)   Chest Tube WDL WDL 6/30/2019  7:10 AM   Function -20 cm H2O 6/30/2019  7:10 AM   Air Leak/Fluctuation air leak present 6/30/2019  7:10 AM   Safety all tubing connections taped;2 rubber-tipped hemostats w/ patient;all connections secured;suction checked 6/30/2019  7:10 AM   Securement tubing secured to body distal to insertion site w/ tape 6/30/2019  7:10 AM   Patency Intervention Tip/tilt 6/30/2019  7:10 AM   Drainage Description Serosanguineous 6/30/2019  7:10 AM   Dressing Appearance occlusive gauze dressing intact 6/30/2019  7:10 AM   Dressing Care dressing changed 6/30/2019 10:00 AM   Left Subcutaneous Emphysema none present 6/30/2019  7:10 AM   Right Subcutaneous Emphysema none present 6/30/2019  7:10 AM   Site Assessment Clean;Dry;Intact;No redness;No swelling;Ecchymotic 6/30/2019 10:00 AM   Surrounding Skin Dry;Intact 6/30/2019  7:10 AM   Output (mL) 0 mL 6/30/2019 10:00 AM   Number of days: 0            Y Chest Tube 3 and 4 06/18/19 1151 3 Left Pleural 19 Fr. 4 Left Mediastinal 19 Fr. (Active)   Function -20 cm H2O 6/30/2019  7:10 AM   Air Leak/Fluctuation air leak not present 6/30/2019  7:10 AM   Safety all tubing connections taped;2 rubber-tipped hemostats w/ patient;all connections secured;suction checked 6/30/2019  7:10 AM   Securement tubing secured to body distal to insertion site w/ tape 6/30/2019  7:10 AM   Left Subcutaneous Emphysema none present 6/30/2019  7:10 AM   Right Subcutaneous Emphysema none present 6/30/2019  7:10 AM   Patency Intervention Tip/tilt 6/30/2019  7:10 AM   Drainage Description 3 Serosanguineous 6/30/2019  7:10 AM   Tube 3 Dressing Appearance occlusive gauze dressing intact 6/30/2019  7:10 AM   Tube 3 Dressing Care dressing changed 6/30/2019 10:00 AM   Site Assessment 3 Clean;Dry;Intact 6/30/2019 10:00 AM   Surrounding Skin  3 Dry;Intact 6/30/2019  7:10 AM   Drainage Description 4 Serosanguineous 6/30/2019  7:10 AM   Tube 4 Dressing Appearance occlusive gauze dressing intact 6/30/2019  7:10 AM   Tube 4 Dressing Care dressing changed 6/30/2019 10:00 AM   Site Assessment 4 Clean;Dry;Intact 6/30/2019 10:00 AM   Surrounding Skin 4 Dry;Intact 6/30/2019  7:10 AM   Output (mL) 0 mL 6/30/2019 10:00 AM   Number of days: 11       Significant Labs:  CBC:  Recent Labs   Lab 07/01/19 0325   WBC 17.55*   RBC 3.20*   HGB 8.9*   HCT 28.1*      MCV 88   MCH 27.8   MCHC 31.7*     BMP:  Recent Labs   Lab 07/01/19 0325   *   K 4.6      CO2 17*   BUN 24*   CREATININE 1.0   CALCIUM 8.3*      Tacrolimus Levels:  Recent Labs   Lab 07/01/19 0325   TACROLIMUS 20.8*     Microbiology:  Microbiology Results (last 7 days)     Procedure Component Value Units Date/Time    Fungus culture [[id number]]  (Abnormal) Collected:  06/18/19 0930    Order Status:  Completed Specimen:  Tissue from Lung, Left Updated:  06/27/19 1055     Fungus (Mycology) Culture CANDIDA ALBICANS  Moderate      Narrative:       Donor lung    Fungus culture [[id number]]  (Abnormal) Collected:  06/23/19 1735    Order Status:  Completed Specimen:  Respiratory from Bronchial Wash Updated:  06/27/19 1048     Fungus (Mycology) Culture BRANDI GLABRATA  Few      Culture, Respiratory [[id number]]  (Susceptibility) Collected:  06/25/19 0900    Order Status:  Completed Specimen:  Respiratory from Bronchial Wash Updated:  06/27/19 0905     Respiratory Culture No S aureus isolated.      PSEUDOMONAS AERUGINOSA   Few       Gram Stain (Respiratory) <10 epithelial cells per low power field.     Gram Stain (Respiratory) Many WBC's     Gram Stain (Respiratory) No organisms seen    Narrative:       Bronchial Wash    AFB Culture & Smear [[id number]] Collected:  06/25/19 0900    Order Status:  Completed Specimen:  Respiratory from Bronchial Wash Updated:  06/26/19 2127     AFB Culture & Smear  Culture in progress     AFB CULTURE STAIN No acid fast bacilli seen.    Narrative:       Bronchial Wash    Fungus culture [[id number]] Collected:  06/25/19 0900    Order Status:  Completed Specimen:  Respiratory from Bronchial Wash Updated:  06/26/19 1102     Fungus (Mycology) Culture Culture in progress    Narrative:       Bronchial Wash    Fungus culture [[id number]] Collected:  06/20/19 0808    Order Status:  Completed Specimen:  Respiratory from Bronchial Wash Updated:  06/26/19 1101     Fungus (Mycology) Culture Culture in progress    Culture, Respiratory with Gram Stain [[id number]]  (Susceptibility) Collected:  06/23/19 1735    Order Status:  Completed Specimen:  Respiratory from Bronchial Wash Updated:  06/26/19 0752     Respiratory Culture No S aureus isolated.      PSEUDOMONAS AERUGINOSA   Few  Normal respiratory anibal also present       Gram Stain (Respiratory) <10 epithelial cells per low power field.     Gram Stain (Respiratory) Few WBC's     Gram Stain (Respiratory) Rare Gram negative rods    AFB Culture & Smear [[id number]] Collected:  06/23/19 1735    Order Status:  Completed Specimen:  Respiratory from Bronchial Wash Updated:  06/24/19 2127     AFB Culture & Smear Culture in progress     AFB CULTURE STAIN No acid fast bacilli seen.          I have reviewed all pertinent labs within the past 24 hours.    Diagnostic Results:  Chest X-Ray: Right-sided pigtail chest tube again noted along with 2 left-sided chest tubes.  No significant change in the cardiopulmonary status.  Continued interstitial accentuation bilaterally with a focal opacity again seen at the right upper thorax.  Continued small pneumothorax bilaterally.             Assessment/Plan:     * S/P lung transplant  POD#13 s/p bilateral lung transplant (re-transplant) for CARLOS EDUARDO. 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 PGD 0. S/p bronch and extubated to HFNC on POD#2, but was re-intubated 6/21 for hypercapnic respiratory failure. Right pigtail  placed for PTX on 6/29. CTS to manage chest tubes.     Continue PT/OT/IS/CPT. Diet pureed with repeat MBSS possibly Wednesday. Continue immunosuppression and prophylaxis. Transfer to TSU today.     Immunosuppression  Previously on tacrolimus, MMF, and daily prednisone. S/p solumedrol in OR, basiliximab on POD#0 and POD#4. Continue MMF, tacrolimus, and steroid taper. Monitor daily tacrolimus levels and adjust dose as needed.       Prophylactic antibiotic  CMV D-/R+. Continue OIP with ganciclovir, fluconazole, and dapsone. Bronch wash 6/20 and 6/23 with  Pseudomonas and Candida Glabrata. Continue vancomycin given history of MRSA in April pre-transplant. Continue ciprofloxacin 400 mg Q8hrs.    Acute blood loss anemia  Received 6u PRBCs, 2u plts, 1 cryo, and 4 FFP angie-operatively. Will be conservative with future transfusions. Repeat CBC if chest tube has copious bloody output.    Adrenal cortical steroids causing adverse effect in therapeutic use  Endocrinology consulted, appreciate recs.    Chronic pain with opiate use  Anesthesia consulted for pain mgmt. Continue aggressive bowel regimen. PCA dilaudid ordered. Will add oral meds once tolerating PO diet and titrate meds as needed, awaiting pain management recs. VBG PRN to watch for development of hypercapnia.    CF related Pancreatic insufficiency  Will restart home creon once patient tolerating pureed diet.     Steroid-induced hyperglycemia  Endocrine following, appreciate recs.     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 Continue Ciprofloxacin, Tobramycin x1 on 6/22.     Acute hyperactive delirium due to another medical condition-resolved as of 7/1/2019  - Likely multifactorial including post-op anesthesia, dissociative narcotics, ICU delirium  - Continue Ativan PRN, Dilaudid PCA.  Currently on Precedex  - Continue Precedex and wean as tolerated  -Patient refusing NGT replacement.           Preventive Measures: Nutrition: Goal: pureed as tolerated, Stress Ulcer: continue prophyllaxis, DVT: continue prophyllaxis, Reposition: per unit routine, Physical Therapy: as tolerated    Counseling/Consultation:I discussed the case with Dr. Alvarez.      XIMENA RajputC  Lung Transplant  [facility]

## 2019-07-01 NOTE — PT/OT/SLP PROGRESS
Speech Language Pathology Treatment    Patient Name:  Juanita Ibarra   MRN:  [id number]  Admitting Diagnosis: S/P lung transplant    Recommendations:                 General Recommendations:  Dysphagia therapy  Diet recommendations:  NPO, Liquid Diet Level: NPO   Aspiration Precautions: Strict aspiration precautions   General Precautions: Standard, fall, contact  Communication strategies:  none    Subjective     Patient awake;alert seated upright in bedside chair. Spouse and mother in law present.     Pain/Comfort:  Pain Rating 1: 0/10    Objective:     Has the patient been evaluated by SLP for swallowing?   Yes  Keep patient NPO? Yes   Current Respiratory Status: room air      Patient with improved respiratory functioning noted today.  Patient tolerated 4 full teaspoons of puree with no overt signs of airway compromise. No coughing/choking, no desat in O2, no change in vocal quality and timely swallow appreciated per manual palpation of larynx. Patient reports increased hoarseness persists.  Patient asking to return to rest - no further PO trials presented. Given previous MBSS and presentation at the bedside this day, patient appears appropriate for meds crushed in puree.  Skilled education was provided to patient and family members re: diet recs, standard aspiration precautions of which to follow, recommendations for all medications crushed in puree at this time, monitoring for repeat MBSS,  and ongoing ST plan of care. Patient and family members verbalized understanding. All the above results and recommendations were discussed with NSG and Team who are in agreement with plan.   Whiteboard updated.    Assessment:     Juanita Ibarra is a 29 y.o. female with an SLP diagnosis of Dysphagia.      Goals:   Multidisciplinary Problems     SLP Goals        Problem: SLP Goal    Goal Priority Disciplines Outcome   SLP Goal     SLP    Description:  Speech Language Pathology Goals  Goals expected to be met  by 7/4:  1. Patient will participate in ongoing swallow assessment.   2. When determined by SLP, patient may benefit from a repeat modified barium swallow study to further objectively assess swallow function/safety.                         Plan:     · Patient to be seen:  4 x/week   · Plan of Care expires:  07/27/19  · Plan of Care reviewed with:  patient, significant other   · SLP Follow-Up:  Yes       Discharge recommendations:  home with home health   Barriers to Discharge:  None    Time Tracking:     SLP Treatment Date:   07/01/19  Speech Start Time:  1025  Speech Stop Time:  1041     Speech Total Time (min):  16 min    Billable Minutes: [facility]/Home Management Training 10    Emily Abadie, CCC-SLP  07/01/2019

## 2019-07-01 NOTE — ASSESSMENT & PLAN NOTE
BG goal 140 - 180.     Restart transition IV insulin infusion at 0.4 u/hr  Moderate dose correction scale  BG monitoring every 4 hours while NPO    ADDENDUM: Change BG monitoring to AC/HS/0200 since diet advanced    Discharge planning: SAUR[facility]

## 2019-07-01 NOTE — CARE UPDATE
"RAPID RESPONSE NURSE PROACTIVE ROUNDING NOTE     Time of Visit: 1525    Admit Date: 2019  LOS: 13  Code Status: Full Code   Date of Visit: 2019  : 1990  Age: 29 y.o.  Sex: female  Race: White  Bed: 24587/17291 A:   MRN: [id number]  Was the patient discharged from an ICU this admission? yes   Was the patient discharged from a PACU within last 24 hours?  no  Did the patient receive conscious sedation/general anesthesia in last 24 hours?  no  Was the patient in the ED within the past 24 hours?  no  Was the patient started on NIPPV within the past 24 hours?  no  Attending Physician: Jake Alvarez MD  Primary Service: Networked reference to record PCT     ASSESSMENT     Diagnosis: S/P lung transplant    Abnormal Vital Signs: BP (!) 140/89   Pulse (!) 139   Temp 97.8 °F (36.6 °C)   Resp (!) 34   Ht 5' 1" (1.549 m)   Wt 65.7 kg (144 lb 13.5 oz)   LMP 10/02/2016   SpO2 99%   Breastfeeding? No   BMI 27.37 kg/m²      Clinical Issues: Dysrythmia    Patient  has a past medical history of Arthritis, Blood transfusion, Bronchiolitis obliterans syndrome, Cystic fibrosis of the lung, Ear infection, Hypertension, Migraine headache, MRSA (methicillin resistant Staphylococcus aureus) carrier, Osteopenia, Other specified disease of pancreas, Pain disorder, Seizures, Sinusitis, chronic, and Vocal cord paralysis.    Upon arrival to bedside, primary RN with patient. Patient sitting up in chair no signs of distress. No complaints of chest pain or shortness of breath. RA O2. Patient reports needing pain medication for comfort and feeling slightly anxious. Primary RN to administer ativan per MD order.     INTERVENTIONS/ RECOMMENDATIONS     Closer monitoring of cardiac and respiratory status.    Discussed plan of care with RNSophy.    PHYSICIAN ESCALATION     Yes/No  no    Orders received and case discussed with NA.    Disposition: Remain in room 00939.    FOLLOW-UP     Call back the Rapid Response Nurse, " Dhara Snyder RN at [phone] for additional questions or concerns.

## 2019-07-01 NOTE — NURSING TRANSFER
Nursing Transfer Note      7/1/2019     Transfer To: [id number]    Transfer via wheelchair    Transfer with cardiac monitoring    Transported by TOMMY Yanes RN    Medicines sent: insulin aspart pen    Chart send with patient: Yes    Notified: significant other at bedside    Patient reassessed at: 07/01/2019 2:04 PM    Upon arrival to floor: cardiac monitor applied, patient calm and oriented to room, pt put on bedside commode, call bell in reach, nurse notified and chart given

## 2019-07-01 NOTE — PT/OT/SLP PROGRESS
Physical Therapy Co-Treatment with OT    Patient Name:  Juanita Ibarra   MRN:  [id number]    Recommendations:     Discharge Recommendations:  home health PT   Discharge Equipment Recommendations: none   Barriers to discharge: Decreased caregiver support family may not be able to assist pt at current functional level.     Assessment:     Juanita Ibarra is a 29 y.o. female admitted with a medical diagnosis of S/P lung transplant.  She presents with the following impairments/functional limitations:  weakness, gait instability, impaired balance, impaired endurance, decreased lower extremity function, impaired functional mobilty pt narciso treatment better being able to get into chair. Pt will benefit from cont skilled PT 4x/wk to progress physically. Pt should be able to discharge home with HHPT when medically stable. Pt is s/p B lung transplant 6/18/19.    Rehab Prognosis: Good; patient would benefit from acute skilled PT services to address these deficits and reach maximum level of function.    Recent Surgery: Procedure(s) (LRB):  REDO BILATERAL LUNG TRANSPLANT; CL[place] (Bilateral) 13 Days Post-Op    Plan:     During this hospitalization, patient to be seen 4 x/week to address the identified rehab impairments via gait training, therapeutic activities and progress toward the following goals:    · Plan of Care Expires:  07/17/19    Subjective     Chief Complaint: pt c/o pain during treatment.   Patient/Family Comments/goals: to get better and go home.   Pain/Comfort:  · Pain Rating 1: 9/10  · Location 1: (B sides and chest)  · Pain Rating Post-Intervention 1: 9/10      Objective:     Communicated with nurse prior to session.  Patient found supine with telemetry, blood pressure cuff, pulse ox (continuous), chest tube, central line, PCA upon PT entry to room.     General Precautions: Standard, fall   Orthopedic Precautions:    Braces:       Functional Mobility:  · Bed Mobility:     · Rolling Left:  moderate  assistance  · Supine to Sit: moderate assistance  ·   · Transfers:     · Sit to Stand:  minimum assistance with no AD. Pt stood x 2 reps.  · Bed to Chair: minimum assistance with  no AD  using  Stand Pivot      AM-PAC 6 CLICK MOBILITY  Turning over in bed (including adjusting bedclothes, sheets and blankets)?: 2  Sitting down on and standing up from a chair with arms (e.g., wheelchair, bedside commode, etc.): 3  Moving from lying on back to sitting on the side of the bed?: 2  Moving to and from a bed to a chair (including a wheelchair)?: 2  Need to walk in hospital room?: 1  Climbing 3-5 steps with a railing?: 1  Basic Mobility Total Score: 11         Patient left up in chair with all lines intact, call button in reach and family present..    GOALS:   Multidisciplinary Problems     Physical Therapy Goals        Problem: Physical Therapy Goal    Goal Priority Disciplines Outcome Goal Variances Interventions   Physical Therapy Goal     PT, PT/OT      Description:  Goals to be met by: 19    Patient will increase functional independence with mobility by performin. Supine to sit with Stand-by Assistance -not met  2. Sit to stand transfer with Supervision -not met  3. Gait  x 250 feet with Supervision -not met                      Time Tracking:     PT Received On: 19  PT Start Time: 936     PT Stop Time: 958  PT Total Time (min): 22 min     Billable Minutes: Therapeutic Activity 22 min    Treatment Type: Other (see comments)(co-treatment with OT)  PT/PTA: PT     PTA Visit Number: 0     Sindy Koo, PT  2019

## 2019-07-01 NOTE — ASSESSMENT & PLAN NOTE
Previously on tacrolimus, MMF, and daily prednisone. S/p solumedrol in OR, basiliximab on POD#0 and POD#4. Continue MMF, tacrolimus, and steroid taper. Monitor daily tacrolimus levels and adjust dose as needed.

## 2019-07-01 NOTE — SUBJECTIVE & OBJECTIVE
"Interval HPI:   Overnight events: Remains in ICU, NAEON.  BG elevated during the day requiring correction scale.  Transition IV insulin infusion inappropriately discontinued yesterday.  Solu medrol 20 mg IV daily; steroid taper per primary.  On IV antibiotics.  May start TF per primary if patient agrees to NGT placement.  Eating: bites of apple sauce sparingly  Nausea: Yes  Hypoglycemia and intervention: No  Fever: No  TPN and/or TF: No    /78   Pulse (!) 123   Temp 98.8 °F (37.1 °C) (Oral)   Resp (!) 21   Ht 5' 1" (1.549 m)   Wt 65.7 kg (144 lb 13.5 oz)   LMP 10/02/2016   SpO2 100%   Breastfeeding? No   BMI 27.37 kg/m²     Labs Reviewed and Include    Recent Labs   Lab 07/01/19  0325   *   CALCIUM 8.3*   ALBUMIN 2.5*   PROT 5.7*   *   K 4.6   CO2 17*      BUN 24*   CREATININE 1.0   ALKPHOS 270*   *   AST 30   BILITOT 0.4     Lab Results   Component Value Date    WBC 17.55 (H) 07/01/2019    HGB 8.9 (L) 07/01/2019    HCT 28.1 (L) 07/01/2019    MCV 88 07/01/2019     07/01/2019     No results for input(s): TSH, FREET4 in the last 168 hours.  Lab Results   Component Value Date    HGBA1C 4.7 01/09/2019       Nutritional status:   Body mass index is 27.37 kg/m².  Lab Results   Component Value Date    ALBUMIN 2.5 (L) 07/01/2019    ALBUMIN 2.1 (L) 06/30/2019    ALBUMIN 2.2 (L) 06/29/2019     Lab Results   Component Value Date    PREALBUMIN 15 (L) 05/29/2014    PREALBUMIN 11 (L) 05/09/2014    PREALBUMIN 18 (L) 03/19/2014       Estimated Creatinine Clearance: 71.4 mL/min (based on SCr of 1 mg/dL).    Accu-Checks  Recent Labs     06/29/19  0419 06/29/19  0822 06/29/19  1259 06/29/19  1555 06/29/19  2344 06/30/19  0500 06/30/19  0904 06/30/19  1219 06/30/19  1519 07/01/19  0006   POCTGLUCOSE 210* 153* 250* 357* 99 155* 175* 275* 305* 182*       Current Medications and/or Treatments Impacting Glycemic Control  Immunotherapy:    Immunosuppressants         Stop Route Frequency     " tacrolimus capsule 2 mg      -- SL 2 times daily     mycophenolate (CELLCEPT) 500 mg in dextrose 5 % 100 mL IVPB      -- IV 2 times daily        Steroids:   Hormones (From admission, onward)    Start     Stop Route Frequency Ordered    06/27/19 0945  methylPREDNISolone sodium succinate injection 20 mg      -- IV Daily 06/27/19 0831    06/26/19 1827  dexAMETHasone sodium phos (PF) 10 mg/mL injection     Note to Pharmacy:  Created by cabinet override    06/27 0629 06/26/19 1827        Pressors:    Autonomic Drugs (From admission, onward)    None        Hyperglycemia/Diabetes Medications:   Antihyperglycemics (From admission, onward)    Start     Stop Route Frequency Ordered    06/28/19 1502  insulin aspart U-100 pen 0-10 Units      -- SubQ As needed (PRN) 06/28/19 1403

## 2019-07-01 NOTE — PLAN OF CARE
Problem: Physical Therapy Goal  Goal: Physical Therapy Goal  Goals to be met by: 19    Patient will increase functional independence with mobility by performin. Supine to sit with Stand-by Assistance -not met  2. Sit to stand transfer with Supervision -not met  3. Gait  x 250 feet with Supervision -not met     Goals remain appropriate. Sindy Koo, PT 2019

## 2019-07-01 NOTE — PLAN OF CARE
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 Ketamine gtt started. Extubated to HFNC. OOBTC x4 hrs.  6/21: Re-intubated, bronch  6/22: tube feeds started  6/23: Bronch  6/24: Ketamine gtt restarted  6/25: Bronch  6/26: extubated. OOBTC  6/27: modified barium (failed), d/c'd nerve blocks  6/29: R pneumo. CT placed. Up to chair x 6 hours    Nurse:  *SBP <160; HR <130  *Accu Q4H          Shawn (significant other, not "legally" ) - [phone]      Outcome: Ongoing (interventions implemented as appropriate)  Plan of care reviewed with pt and significant other, Shawn, who is very supportive and active in her care.  T Max 98.8 oral.  Pain and anxiety meds given as ordered and prn.  Vancomycin dose held this am related to elevated trough.  Remains NPO and continues to refuse for ngt be inserted.  Voids without difficulty via bedpan or bscc with total lift assist from significant other.  Unable to stand or ambulate without total assist.  Activity and muscle strengthening encouraged.  Refer to [facility] for assessments and updates.  Free from falls or injury.  Turned and repositioned q2hrs and prn.        "

## 2019-07-01 NOTE — SUBJECTIVE & OBJECTIVE
Subjective:     Interval History: No acute events overnight. Patient OOBTC on room air. Non-compliant with IS. Pain well controlled on current therapy. Transfer to TSU.     Continuous Infusions:   insulin (HUMAN R) infusion (adults)       Scheduled Meds:   acetaminophen  650 mg Oral Q6H    acetylcysteine 200 mg/ml (20%)  4 mL Nebulization Q12H    ciprofloxacin  400 mg Intravenous Q8H    enoxaparin  40 mg Subcutaneous Daily    famotidine  20 mg Oral BID    [START ON 7/2/2019] fluconazole  400 mg Oral Daily    gabapentin  300 mg Oral TID    ganciclovir (CYTOVENE) IVPB  5 mg/kg Intravenous Q12H    levalbuterol  1.25 mg Nebulization Q6H WAKE    lidocaine  1 patch Transdermal Q24H    lidocaine  1 patch Transdermal Q24H    metoprolol tartrate  25 mg Oral TID    morphine  15 mg Oral Q12H    mycophenolate (CELLCEPT) IVPB  500 mg Intravenous BID    polyethylene glycol  17 g Per NG tube BID    [START ON 7/2/2019] predniSONE  20 mg Oral Daily    QUEtiapine  25 mg Oral Daily    QUEtiapine  50 mg Oral QHS    sodium chloride 7%  4 mL Nebulization Q12H    vancomycin (VANCOCIN) IVPB  750 mg Intravenous Q12H     PRN Meds:bisacodyl, Dextrose 10% Bolus, Dextrose 10% Bolus, dextrose 50%, dextrose 50%, diphenhydrAMINE, glucagon (human recombinant), glucose, glucose, HYDROmorphone, insulin aspart U-100, lactulose, levalbuterol, lipase-protease-amylase (VIOKACE) 20,880-78,300- 78,300 units, lorazepam, magnesium sulfate IVPB, metoclopramide HCl, metoprolol, morphine, naloxone, OLANZapine, ondansetron, ondansetron, oxyCODONE, oxyCODONE, potassium chloride in water **AND** potassium chloride in water **AND** potassium chloride in water, promethazine (PHENERGAN) IVPB    Review of patient's allergies indicates:   Allergen Reactions    Albuterol Palpitations    Colistin Anaphylaxis    Vancomycin analogues      Infusion reaction that does not resolve with slowing  Pt. States she can tolerate it when given with 50 mg  Benadryl and ran over 3 hours    Neupogen [filgrastim] Other (See Comments)     Ostealgia after five daily doses of 300 mcg.      Bactrim [sulfamethoxazole-trimethoprim] Hives    Ceftazidime Hives     Pt stated can tolerate cefapine not ceftazidime    Ceftazidime     Dronabinol Other (See Comments)     Mental changes/hallucinations    Haldol [haloperidol lactate] Other (See Comments)     Seizure like activity    Nsaids (non-steroidal anti-inflammatory drug)      Cannot have due to lung transplant    Adhesive Rash     Cloth tape- please use tegaderm or paper tape    Aztreonam Rash    Ciprofloxacin Nausea And Vomiting     Projectile N/V, per patient.  Unwilling to retry therapy.       Review of Systems   Constitutional: Negative for appetite change, chills, diaphoresis and fever.   HENT: Negative for congestion, postnasal drip, rhinorrhea, sinus pressure and sinus pain.    Respiratory: Positive for cough and shortness of breath. Negative for wheezing.    Cardiovascular: Positive for chest pain (insertion site). Negative for palpitations and leg swelling.   Gastrointestinal: Negative for abdominal distention, diarrhea, nausea and vomiting.   Genitourinary: Negative for decreased urine volume, difficulty urinating, dysuria and urgency.   Musculoskeletal: Positive for back pain (chronic).   Skin: Negative for rash and wound.   Allergic/Immunologic: Positive for immunocompromised state.   Neurological: Positive for weakness. Negative for dizziness, syncope and headaches.   Psychiatric/Behavioral: Negative for confusion. The patient is nervous/anxious.      Objective:   Physical Exam   Constitutional: She appears well-developed and well-nourished. She is easily aroused. No distress.   HENT:   Head: Normocephalic and atraumatic.   Nose: Nose normal.   Eyes: Conjunctivae and EOM are normal. No scleral icterus.   Neck: No JVD present.   Right IJ CVC, CDI   Cardiovascular: Regular rhythm. Tachycardia present. Exam  reveals no friction rub.   No murmur heard.  Pulmonary/Chest: No accessory muscle usage. No respiratory distress. She has decreased breath sounds in the right lower field and the left lower field. She has no wheezes. She has no rhonchi. She has no rales.   Bilateral chest tubes to suction, no air leak   Abdominal: Soft. Bowel sounds are normal. She exhibits no distension. There is no tenderness.   Musculoskeletal: She exhibits no edema.   Neurological: She is alert and easily aroused. No cranial nerve deficit.   Skin: Skin is warm and dry. No rash noted. She is not diaphoretic. No erythema.   Psychiatric: Her mood appears anxious.   Nursing note and vitals reviewed.        Vital Signs (Most Recent):  Temp: 97.8 °F (36.6 °C) (07/01/19 0700)  Pulse: (!) 138 (07/01/19 1106)  Resp: (!) 21 (07/01/19 1106)  BP: 118/89 (07/01/19 0800)  SpO2: 97 % (07/01/19 1106) Vital Signs (24h Range):  Temp:  [97.8 °F (36.6 °C)-98.8 °F (37.1 °C)] 97.8 °F (36.6 °C)  Pulse:  [109-138] 138  Resp:  [13-37] 21  SpO2:  [93 %-100 %] 97 %  BP: (116-157)/() 118/89     Weight: 65.7 kg (144 lb 13.5 oz)  Body mass index is 27.37 kg/m².      Intake/Output Summary (Last 24 hours) at 7/1/2019 1330  Last data filed at 7/1/2019 0700  Gross per 24 hour   Intake 2167 ml   Output 1370 ml   Net 797 ml       Ventilator Data:          Hemodynamic Parameters:       Lines/Drains:       Introducer right internal jugular (Active)   Specific Qualities Capped 6/30/2019  7:10 AM   Dressing Status Clean;Dry;Biopatch in place;Intact 6/30/2019  7:10 AM   Dressing Intervention Dressing reinforced 6/29/2019  3:00 AM   Dressing Change Due 06/29/19 6/30/2019  3:00 AM   Daily Line Review Performed 6/30/2019  7:10 AM   Number of days:             Port A Cath Single Lumen 06/24/14 1200 right subclavian (Active)   Accessed by: Radha Lanza 6/24/2019  5:00 AM   Dressing Type Transparent 6/30/2019  7:10 AM   Dressing Status Biopatch in place;Clean;Dry;Intact 6/30/2019   7:10 AM   Dressing Intervention Dressing reinforced 6/29/2019  3:00 AM   Dressing Change Due 06/30/19 6/30/2019  3:00 AM   Line Status Infusing 6/30/2019  7:10 AM   Flush Performed Yes 6/30/2019  7:10 AM   Date to be Reflushed 06/18/19 6/29/2019  3:00 AM   Daily Line Review Performed 6/30/2019  7:10 AM   Type of Needle Nicole 6/30/2019  3:00 AM   Gauge 20 6/30/2019  3:00 AM   Needle Length 1 in 6/30/2019  3:00 AM   Needle Status Left in place 6/30/2019  3:00 AM   Needle Insertion Date 06/24/19 6/30/2019  3:00 AM   Needle Insertion Time 0500 6/26/2019  3:00 AM   Number of days: 1831            Percutaneous Central Line Insertion/Assessment - double lumen  06/18/19 0510 (Active)   Dressing biopatch in place;dressing dry and intact 6/30/2019  7:10 AM   Securement secured w/ sutures 6/30/2019  7:10 AM   Additional Site Signs no erythema;no warmth 6/30/2019  7:10 AM   Distal Patency/Care infusing 6/30/2019  7:10 AM   Proximal Patency/Care infusing 6/30/2019  7:10 AM   Waveform normal 6/30/2019  7:10 AM   Line Interventions line leveled/zeroed 6/30/2019  7:10 AM   Dressing Change Due 06/29/19 6/30/2019  3:00 AM   Daily Line Review Performed 6/30/2019  7:10 AM   Number of days: 12            Peripheral IV - Single Lumen 06/27/19 0917 22 G Right Forearm (Active)   Site Assessment Clean;Dry;Intact;No redness;No swelling 6/30/2019  7:10 AM   Line Status Infusing 6/30/2019  7:10 AM   Dressing Status Clean;Dry;Intact 6/30/2019  7:10 AM   Dressing Intervention Dressing reinforced 6/29/2019  3:00 AM   Dressing Change Due 07/01/19 6/30/2019  3:00 AM   Site Change Due 07/01/19 6/29/2019  7:00 PM   Reason Not Rotated Not due 6/30/2019  7:10 AM   Number of days: 3            Chest Tube 06/29/19 1655 1 Right Fourth intercostal space;Midaxillary 7 Fr. (Active)   Chest Tube WDL WDL 6/30/2019  7:10 AM   Function -20 cm H2O 6/30/2019  7:10 AM   Air Leak/Fluctuation air leak present 6/30/2019  7:10 AM   Safety all tubing connections taped;2  rubber-tipped hemostats w/ patient;all connections secured;suction checked 6/30/2019  7:10 AM   Securement tubing secured to body distal to insertion site w/ tape 6/30/2019  7:10 AM   Patency Intervention Tip/tilt 6/30/2019  7:10 AM   Drainage Description Serosanguineous 6/30/2019  7:10 AM   Dressing Appearance occlusive gauze dressing intact 6/30/2019  7:10 AM   Dressing Care dressing changed 6/30/2019 10:00 AM   Left Subcutaneous Emphysema none present 6/30/2019  7:10 AM   Right Subcutaneous Emphysema none present 6/30/2019  7:10 AM   Site Assessment Clean;Dry;Intact;No redness;No swelling;Ecchymotic 6/30/2019 10:00 AM   Surrounding Skin Dry;Intact 6/30/2019  7:10 AM   Output (mL) 0 mL 6/30/2019 10:00 AM   Number of days: 0            Y Chest Tube 3 and 4 06/18/19 1151 3 Left Pleural 19 Fr. 4 Left Mediastinal 19 Fr. (Active)   Function -20 cm H2O 6/30/2019  7:10 AM   Air Leak/Fluctuation air leak not present 6/30/2019  7:10 AM   Safety all tubing connections taped;2 rubber-tipped hemostats w/ patient;all connections secured;suction checked 6/30/2019  7:10 AM   Securement tubing secured to body distal to insertion site w/ tape 6/30/2019  7:10 AM   Left Subcutaneous Emphysema none present 6/30/2019  7:10 AM   Right Subcutaneous Emphysema none present 6/30/2019  7:10 AM   Patency Intervention Tip/tilt 6/30/2019  7:10 AM   Drainage Description 3 Serosanguineous 6/30/2019  7:10 AM   Tube 3 Dressing Appearance occlusive gauze dressing intact 6/30/2019  7:10 AM   Tube 3 Dressing Care dressing changed 6/30/2019 10:00 AM   Site Assessment 3 Clean;Dry;Intact 6/30/2019 10:00 AM   Surrounding Skin 3 Dry;Intact 6/30/2019  7:10 AM   Drainage Description 4 Serosanguineous 6/30/2019  7:10 AM   Tube 4 Dressing Appearance occlusive gauze dressing intact 6/30/2019  7:10 AM   Tube 4 Dressing Care dressing changed 6/30/2019 10:00 AM   Site Assessment 4 Clean;Dry;Intact 6/30/2019 10:00 AM   Surrounding Skin 4 Dry;Intact 6/30/2019  7:10  AM   Output (mL) 0 mL 6/30/2019 10:00 AM   Number of days: 11       Significant Labs:  CBC:  Recent Labs   Lab 07/01/19  0325   WBC 17.55*   RBC 3.20*   HGB 8.9*   HCT 28.1*      MCV 88   MCH 27.8   MCHC 31.7*     BMP:  Recent Labs   Lab 07/01/19 0325   *   K 4.6      CO2 17*   BUN 24*   CREATININE 1.0   CALCIUM 8.3*      Tacrolimus Levels:  Recent Labs   Lab 07/01/19  0325   TACROLIMUS 20.8*     Microbiology:  Microbiology Results (last 7 days)     Procedure Component Value Units Date/Time    Fungus culture [[id number]]  (Abnormal) Collected:  06/18/19 0930    Order Status:  Completed Specimen:  Tissue from Lung, Left Updated:  06/27/19 1055     Fungus (Mycology) Culture CANDIDA ALBICANS  Moderate      Narrative:       Donor lung    Fungus culture [[id number]]  (Abnormal) Collected:  06/23/19 1735    Order Status:  Completed Specimen:  Respiratory from Bronchial Wash Updated:  06/27/19 1048     Fungus (Mycology) Culture BRANDI GLABRATA  Few      Culture, Respiratory [[id number]]  (Susceptibility) Collected:  06/25/19 0900    Order Status:  Completed Specimen:  Respiratory from Bronchial Wash Updated:  06/27/19 0905     Respiratory Culture No S aureus isolated.      PSEUDOMONAS AERUGINOSA   Few       Gram Stain (Respiratory) <10 epithelial cells per low power field.     Gram Stain (Respiratory) Many WBC's     Gram Stain (Respiratory) No organisms seen    Narrative:       Bronchial Wash    AFB Culture & Smear [[id number]] Collected:  06/25/19 0900    Order Status:  Completed Specimen:  Respiratory from Bronchial Wash Updated:  06/26/19 2127     AFB Culture & Smear Culture in progress     AFB CULTURE STAIN No acid fast bacilli seen.    Narrative:       Bronchial Wash    Fungus culture [[id number]] Collected:  06/25/19 0900    Order Status:  Completed Specimen:  Respiratory from Bronchial Wash Updated:  06/26/19 1102     Fungus (Mycology) Culture Culture in progress    Narrative:       Bronchial Wash     Fungus culture [[id number]] Collected:  06/20/19 0808    Order Status:  Completed Specimen:  Respiratory from Bronchial Wash Updated:  06/26/19 1101     Fungus (Mycology) Culture Culture in progress    Culture, Respiratory with Gram Stain [[id number]]  (Susceptibility) Collected:  06/23/19 1735    Order Status:  Completed Specimen:  Respiratory from Bronchial Wash Updated:  06/26/19 0752     Respiratory Culture No S aureus isolated.      PSEUDOMONAS AERUGINOSA   Few  Normal respiratory anibal also present       Gram Stain (Respiratory) <10 epithelial cells per low power field.     Gram Stain (Respiratory) Few WBC's     Gram Stain (Respiratory) Rare Gram negative rods    AFB Culture & Smear [[id number]] Collected:  06/23/19 1735    Order Status:  Completed Specimen:  Respiratory from Bronchial Wash Updated:  06/24/19 2127     AFB Culture & Smear Culture in progress     AFB CULTURE STAIN No acid fast bacilli seen.          I have reviewed all pertinent labs within the past 24 hours.    Diagnostic Results:  Chest X-Ray: Right-sided pigtail chest tube again noted along with 2 left-sided chest tubes.  No significant change in the cardiopulmonary status.  Continued interstitial accentuation bilaterally with a focal opacity again seen at the right upper thorax.  Continued small pneumothorax bilaterally.

## 2019-07-01 NOTE — ASSESSMENT & PLAN NOTE
Anesthesia consulted for pain mgmt. Continue aggressive bowel regimen. PCA dilaudid ordered. Will add oral meds once tolerating PO diet and titrate meds as needed, awaiting pain management recs. VBG PRN to watch for development of hypercapnia.

## 2019-07-01 NOTE — ANESTHESIA POST-OP PAIN MANAGEMENT
Acute Pain Service Progress Note    Juanita Ibarra is a 29 y.o., female, [id number].    Juanita Ibarra is a 29 y.o., female, [id number].     Surgery:  REDO BILATERAL LUNG TRANSPLANT     Post Op Day #: 12     Catheter type: None, bilateral ESPs removed 6/27/19    Problem List:    Active Hospital Problems    Diagnosis  POA    *S/P lung transplant [Z94.2]  Not Applicable    On enteral nutrition [Z78.9]  No    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 [A49.8, Z16.19]  Unknown    Acute hyperactive delirium due to another medical condition [F05]  Unknown    Prophylactic immunotherapy [Z29.8]  Not Applicable    Acute blood loss anemia [D62]  No    Thrombocytopenia, unspecified [D69.6]  No    Steroid-induced hyperglycemia [R73.9, T38.0X5A]  Yes    Prophylactic antibiotic [Z79.2]  Not Applicable    Constipation [K59.00]  Yes    Immunosuppression [D89.9]  Yes    Hyperglycemia [R73.9]  Yes    Adrenal cortical steroids causing adverse effect in therapeutic use [T38.0X5A]  No    CF related Pancreatic insufficiency [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 Patient resting comfortable in bed this AM. Anxiety still a significant issue per Nursing  Objective:        Vitals   Vitals:    07/01/19 1106   BP:    Pulse: (!) 138   Resp: (!) 21   Temp:         Labs    Admission on 06/17/2019   No results displayed because visit has over 200 results.           Meds   Current Facility-Administered Medications   Medication Dose Route Frequency Provider Last Rate Last Dose    acetaminophen tablet 650 mg  650  mg Oral Q6H Heather Awad MD        acetylcysteine 200 mg/ml (20%) solution 4 mL  4 mL Nebulization Q12H Jake Alvarez MD   4 mL at 06/29/19 0846    bisacodyl suppository 10 mg  10 mg Rectal Daily PRN Shama Canales PA-C   10 mg at 06/28/19 0941    ciprofloxacin (CIPRO)400mg/200ml D5W IVPB 400 mg  400 mg Intravenous Q8H Jacqueline Dumont PA-C 200 mL/hr at 07/01/19 0613 400 mg at 07/01/19 0613    dextrose 10% (D10W) Bolus  12.5 g Intravenous PRN Jonathan Newby MD 1,000 mL/hr at 06/19/19 0232 125 mL at 06/19/19 0232    dextrose 10% (D10W) Bolus  25 g Intravenous PRN Jonathan Newby MD        dextrose 50% injection 12.5 g  12.5 g Intravenous PRN Amy Zapata NP        dextrose 50% injection 25 g  25 g Intravenous PRN Amy Zapata NP        diphenhydrAMINE injection 50 mg  50 mg Intravenous On Call Procedure Francisca Morin,    50 mg at 06/30/19 2325    enoxaparin injection 40 mg  40 mg Subcutaneous Daily Jacqueline Dumont PA-C   40 mg at 06/30/19 1631    famotidine (PF) injection 20 mg  20 mg Intravenous BID Jacqueline Dumont PA-C   20 mg at 07/01/19 0836    fluconazole (DIFLUCAN) IVPB 400 mg 200 mL  400 mg Intravenous Q24H Shama Canales PA-C 100 mL/hr at 07/01/19 0800 400 mg at 07/01/19 0800    gabapentin capsule 300 mg  300 mg Oral TID Heather Awad MD        ganciclovir (CYTOVENE) 295 mg in sodium chloride 0.9% 100 mL IVPB  5 mg/kg Intravenous Q12H Jacqueline Douglass  mL/hr at 07/01/19 0900 295 mg at 07/01/19 0900    glucagon (human recombinant) injection 1 mg  1 mg Intramuscular PRN Amy Zapata NP        glucose chewable tablet 16 g  16 g Oral PRN Amy Zapata NP        glucose chewable tablet 24 g  24 g Oral PRN Amy Zapata NP        hydromorphone (PF) injection 2 mg  2 mg Intravenous Q1H PRN Jhoan Antunez MD   2 mg at 07/01/19 0834    insulin aspart U-100 pen 0-10 Units  0-10 Units Subcutaneous PRN Deny PAZ  KUMAR Swenson   2 Units at 07/01/19 0329    insulin regular (Humulin R) 100 Units in sodium chloride 0.9% 100 mL infusion  0.4 Units/hr Intravenous Continuous Deny Swenson NP        lactulose 20 gram/30 mL solution Soln 20 g  20 g Oral Q6H PRN Angelica Gomez MD        levalbuterol nebulizer solution 1.25 mg  1.25 mg Nebulization Q4H PRN Jacqueline Dumont PA-C   1.25 mg at 06/30/19 0545    levalbuterol nebulizer solution 1.25 mg  1.25 mg Nebulization Q6H WAKE Jacqueline Dumont PA-C   1.25 mg at 07/01/19 0859    lidocaine 5 % patch 1 patch  1 patch Transdermal Q24H Farhan H MD Karen   Stopped at 06/29/19 1245    lidocaine 5 % patch 1 patch  1 patch Transdermal Q24H Farhan H MD Karen   Stopped at 06/29/19 1245    lipase-protease-amylase (VIOKACE) 20,880-78,300- 78,300 units per tablet 1 tablet  1 tablet Oral Q3H PRN Francisca Morin, DO   1 tablet at 06/24/19 1700    lorazepam (ATIVAN) injection 2 mg  2 mg Intravenous Q4H PRN Jacqueline Dumont PA-C   2 mg at 07/01/19 0833    magnesium sulfate 2g in water 50mL IVPB (premix)  2 g Intravenous PRN Angelica Gomez MD   2 g at 06/30/19 0838    methylPREDNISolone sodium succinate injection 20 mg  20 mg Intravenous Daily Jake Alvarez MD   20 mg at 07/01/19 0836    metoclopramide HCl injection 5 mg  5 mg Intravenous Q6H PRN Jacqueline Douglass MD   5 mg at 06/29/19 1400    metoprolol injection 5 mg  5 mg Intravenous Q6H PRN Reyes Will NP   5 mg at 07/01/19 0130    morphine 12 hr tablet 15 mg  15 mg Oral Q12H Heather Awad MD        morphine injection 6 mg  6 mg Intravenous Q2H PRN Heather Awad MD        mycophenolate (CELLCEPT) 500 mg in dextrose 5 % 100 mL IVPB  500 mg Intravenous BID Shama Canales PA-C 50 mL/hr at 07/01/19 0900 500 mg at 07/01/19 0900    naloxone 0.4 mg/mL injection 0.02 mg  0.02 mg Intravenous PRN Riley Pennington MD        OLANZapine injection 10 mg  10 mg Intramuscular  BID PRN Jacqueline Dumont PA-C   10 mg at 06/28/19 0339    ondansetron disintegrating tablet 8 mg  8 mg Oral Q8H PRN Jacqueline Douglass MD        ondansetron injection 8 mg  8 mg Intravenous Q6H PRN Reyes Will NP   8 mg at 07/01/19 1045    oxyCODONE immediate release tablet 5 mg  5 mg Oral Q4H PRN Heather Awad MD        oxyCODONE immediate release tablet Tab 10 mg  10 mg Oral Q4H PRN Heather Awad MD        polyethylene glycol packet 17 g  17 g Per NG tube BID Jacqueline Dumont PA-C   Stopped at 06/29/19 0900    potassium chloride 20 mEq in 100 mL IVPB (FOR CENTRAL LINE ADMINISTRATION ONLY)  20 mEq Intravenous PRN Jacqueline Douglass MD 50 mL/hr at 06/22/19 0647 20 mEq at 06/22/19 0647    And    potassium chloride 40 mEq in 100 mL IVPB (FOR CENTRAL LINE ADMINISTRATION ONLY)  40 mEq Intravenous PRN Jacqueline Douglass MD 25 mL/hr at 06/18/19 1749 40 mEq at 06/18/19 1749    And    potassium chloride 20 mEq in 100 mL IVPB (FOR CENTRAL LINE ADMINISTRATION ONLY)  60 mEq Intravenous PRN Jacqueline Douglass MD        promethazine (PHENERGAN) 6.25 mg in dextrose 5 % 50 mL IVPB  6.25 mg Intravenous Q6H PRN Chai Glass MD        sodium chloride 7% nebulizer solution 4 mL  4 mL Nebulization Q12H Jake Alvarez MD   4 mL at 07/01/19 1106    vancomycin 750 mg in dextrose 5 % 250 mL IVPB (ready to mix system)  750 mg Intravenous Q12H Jake Alvarez MD          Anticoagulant dose: none.     Assessment:                 Pain control adequate     Plan:     Patient now s/p bilateral lung transplant on 06/18/19; Hx of significant opioid use post previous lung transplant approximately five years ago. Patient with significant opioid requirement prior to surgery from previous lung transplant. Remained intubated s/p lung transplant, requiring fentanyl with dexmedetomidine at 1.4mcg/kg/hr and propofol 50mcg/kg/min for sedation. S/P bilateral TARA catheter placement 06/19/19 w/ hand boluses Q12H  at 0700 and 1900. Patient placed on ketamine infusion on 6/20 at 10 mcg/kg/min (titrated up to 25 mcg/kg/min), extubated on 6/21 but re-intubated for respiratory distress, agitation, delirium. Ketamine discontinued. Restarted low dose non-titrating 5 mcg/kg/min on 6/24/19. Extubated 6/26/19 to HFNC. Bilateral TARA catheters pulled on 6/27/19. MBSS failed 6/28. Ketamine stopped 6/28/19.     - Ketamine stopped 6/28/19  - Dilaudid PCA reduced to 0.3mg basal, 0.2 mg q6min, 2.3mg/hr max. Pain no better than yesterday but manageable.  - Passed swallow with Speech and now approved for crushed/pureed. Transitioning pain regimen to PO as follows based on morphine equivalent calculations:  - MS Contin 15mg BID  - Oxycodone 5mg for moderate pain q4 PRN; Oxycodone 10mg for severe pain q4 PRN  - Morphine 6mg IV PRN breakthrough pain  - Acetaminophen 650mg q6 scheduled  - Gabapentin 300mg TID    Will continue to follow and adjust the above regimen as needed.  Defer treatment of anxiety to primary team       Evaluator Heather Awad    I have seen the patient, reviewed the Resident's assessment and plan. I have personally interviewed and examined the patient at bedside and: agree with the findings.

## 2019-07-01 NOTE — ASSESSMENT & PLAN NOTE
POD#13 s/p bilateral lung transplant (re-transplant) for CARLOS EDUARDO. 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 PGD 0. S/p bronch and extubated to HFNC on POD#2, but was re-intubated 6/21 for hypercapnic respiratory failure. Right pigtail placed for PTX on 6/29. CTS to manage chest tubes.     Continue PT/OT/IS/CPT. Diet pureed with repeat MBSS possibly Wednesday. Continue immunosuppression and prophylaxis. Transfer to TSU today.

## 2019-07-01 NOTE — PROGRESS NOTES
Pt transferred from SICU.  with pt. AAO. BP and HR ^. Pt anxious and in pain from the transfer, requesting IV Dilaudid and Ativan. Insulin gtt infusing 0.4 units/hr. Oriented to room and call bell. Fall education reviewed with pt. Instructed to call if assistance needed, verbalized understanding. Informed FELICIA Laguerre of pt's arrival.

## 2019-07-01 NOTE — PT/OT/SLP PROGRESS
Occupational Therapy   Treatment    Name: Juanita Ibarra  MRN: [id number]  Admitting Diagnosis:  S/P lung transplant  13 Days Post-Op    Recommendations:     Discharge Recommendations: home with home health  Discharge Equipment Recommendations:  none  Barriers to discharge:  None    Assessment:     Juanita Ibarra is a 29 y.o. female with a medical diagnosis of S/P lung transplant.  Performance deficits affecting function are weakness, impaired self care skills, impaired balance, impaired functional mobilty, impaired endurance, impaired cardiopulmonary response to activity, pain, gait instability.     Rehab Prognosis:  Good; patient would benefit from acute skilled OT services to address these deficits and reach maximum level of function.       Plan:     Patient to be seen 3 x/week to address the above listed problems via self-care/home management, therapeutic activities, therapeutic exercises  · Plan of Care Expires:    · Plan of Care Reviewed with: patient, significant other    Subjective     Pain/Comfort:  · Pain Rating 1: 9/10  · Location - Side 1: Bilateral  · Location - Orientation 1: generalized  · Location 1: (chest)  · Pain Addressed 1: Reposition, Distraction, Nurse notified  · Pain Rating Post-Intervention 1: 9/10    Objective:     Communicated with: RN prior to session.  Patient found supine with blood pressure cuff, telemetry, pulse ox (continuous), oxygen, PCA, central line, chest tube(Comfort Flow) upon OT entry to room.    General Precautions: Standard, fall, contact   Orthopedic Precautions:    Braces:       Occupational Performance:     Bed Mobility:    · Patient completed Scooting/Bridging with moderate assistance to EOB  · Patient completed Supine to Sit with moderate assistance     Functional Mobility/Transfers:  · Patient completed Sit <> Stand Transfer with minimum assistance  with  no assistive device from EOB   · Patient completed Bed <> Chair Transfer using Stand Pivot  technique with moderate assistance with no assistive device  · Patient completed Toilet Transfer Stand Pivot and sit<>stand technique with moderate assistance with  bedside commode  · Functional Mobility: N T    Activities of Daily Living:  · Grooming: minimum assistance seated  · Upper Body Dressing: minimum assistance    · Lower Body Dressing: moderate assistance    · Toileting: minimum assistance for pericare in sitting on bedside commode      AMPA 6 Click ADL: 19    Treatment & Education:  Pt ed on OT POC  Pt completed toileting and t/f to chair as above with significant increase in time    Patient left up in chair with all lines intact, call button in reach, RN  notified and familuy presentEducation:      GOALS:   Multidisciplinary Problems     Occupational Therapy Goals        Problem: Occupational Therapy Goal    Goal Priority Disciplines Outcome Interventions   Occupational Therapy Goal     OT, PT/OT Ongoing (interventions implemented as appropriate)    Description:  Goals to be met by: 7/4/19     Patient will increase functional independence with ADLs by performing:    UE Dressing with Supervision.  LE Dressing with Supervision.  Grooming while standing at sink with Supervision.  Toileting from toilet with Supervision for hygiene and clothing management.   Toilet transfer to toilet with Supervision.                      Time Tracking:     OT Date of Treatment: 07/01/19  OT Start Time: 0914 second visit 0931  OT Stop Time: 0927 scond visit 0941  OT Total Time (min): 13 min/ 10 min    Billable Minutes:Self Care/Home Management 23    LUCIA Foy  7/1/2019

## 2019-07-02 NOTE — PROGRESS NOTES
[facility]  Lung Transplant  Progress Note - Floor    Patient Name: Juanita Ibarra  MRN: [id number]  Admission Date: 6/17/2019  Hospital Length of Stay: 14 days  Post-Operative Day: 1846 (Lung), 14 (Lung)  Attending Physician: Francisca Morin DO  Primary Care Provider: Primary Doctor No     Subjective:     Interval History: No acute events overnight. Patient OOBTC on room air. Pain well controlled. Cough stronger, but still reports difficulty clearing secretions. No BM, but reports flatus. Tolerating pureed diet.     Continuous Infusions:    Scheduled Meds:   acetaminophen  650 mg Oral Q6H    acetylcysteine 200 mg/ml (20%)  4 mL Nebulization Q12H    ciprofloxacin  400 mg Intravenous Q8H    enoxaparin  40 mg Subcutaneous Daily    famotidine  20 mg Oral BID    fluconazole  400 mg Oral Daily    gabapentin  300 mg Oral TID    ganciclovir (CYTOVENE) IVPB  5 mg/kg Intravenous Q12H    levalbuterol  1.25 mg Nebulization Q6H WAKE    lidocaine  1 patch Transdermal Q24H    lidocaine  1 patch Transdermal Q24H    metoprolol tartrate  25 mg Oral TID    morphine  15 mg Oral Q6H    mycophenolate (CELLCEPT) IVPB  500 mg Intravenous BID    polyethylene glycol  17 g Per NG tube BID    predniSONE  20 mg Oral Daily    QUEtiapine  25 mg Oral Daily    QUEtiapine  50 mg Oral QHS    sodium chloride 7%  4 mL Nebulization Q12H    vancomycin (VANCOCIN) IVPB  750 mg Intravenous Q12H     PRN Meds:bisacodyl, Dextrose 10% Bolus, Dextrose 10% Bolus, diphenhydrAMINE, glucagon (human recombinant), glucose, glucose, HYDROmorphone, insulin aspart U-100, lactulose, lipase-protease-amylase (VIOKACE) 20,880-78,300- 78,300 units, lorazepam, magnesium sulfate IVPB, metoclopramide HCl, metoprolol, naloxone, OLANZapine, ondansetron, ondansetron, potassium chloride in water **AND** potassium chloride in water **AND** potassium chloride in water, promethazine (PHENERGAN) IVPB    Review of patient's allergies  indicates:   Allergen Reactions    Albuterol Palpitations    Colistin Anaphylaxis    Vancomycin analogues      Infusion reaction that does not resolve with slowing  Pt. States she can tolerate it when given with 50 mg Benadryl and ran over 3 hours    Neupogen [filgrastim] Other (See Comments)     Ostealgia after five daily doses of 300 mcg.      Bactrim [sulfamethoxazole-trimethoprim] Hives    Ceftazidime Hives     Pt stated can tolerate cefapine not ceftazidime    Ceftazidime     Dronabinol Other (See Comments)     Mental changes/hallucinations    Haldol [haloperidol lactate] Other (See Comments)     Seizure like activity    Nsaids (non-steroidal anti-inflammatory drug)      Cannot have due to lung transplant    Adhesive Rash     Cloth tape- please use tegaderm or paper tape    Aztreonam Rash    Ciprofloxacin Nausea And Vomiting     Projectile N/V, per patient.  Unwilling to retry therapy.       Review of Systems   Constitutional: Negative for appetite change, chills, diaphoresis and fever.   HENT: Negative for congestion, postnasal drip, rhinorrhea, sinus pressure and sinus pain.    Respiratory: Positive for cough and shortness of breath. Negative for wheezing.    Cardiovascular: Positive for chest pain (insertion site). Negative for palpitations and leg swelling.   Gastrointestinal: Negative for abdominal distention, diarrhea, nausea and vomiting.   Genitourinary: Negative for decreased urine volume, difficulty urinating, dysuria and urgency.   Musculoskeletal: Positive for back pain (chronic).   Skin: Negative for rash and wound.   Allergic/Immunologic: Positive for immunocompromised state.   Neurological: Positive for weakness. Negative for dizziness, syncope and headaches.   Psychiatric/Behavioral: Negative for confusion. The patient is nervous/anxious.      Objective:   Physical Exam   Constitutional: She appears well-developed and well-nourished. She is easily aroused. No distress.   HENT:    Head: Normocephalic and atraumatic.   Nose: Nose normal.   Eyes: Conjunctivae and EOM are normal. No scleral icterus.   Neck: No JVD present.   Right IJ CVC, CDI   Cardiovascular: Regular rhythm. Tachycardia present. Exam reveals no friction rub.   No murmur heard.  Pulmonary/Chest: No accessory muscle usage. No respiratory distress. She has decreased breath sounds in the right lower field and the left lower field. She has no wheezes. She has rhonchi in the right upper field and the left upper field. She has no rales.   Bilateral chest tubes to suction, no air leak   Abdominal: Soft. Bowel sounds are normal. She exhibits no distension. There is no tenderness.   Musculoskeletal: She exhibits no edema.   Neurological: She is alert and easily aroused. No cranial nerve deficit.   Skin: Skin is warm and dry. No rash noted. She is not diaphoretic. No erythema.   Psychiatric: Her mood appears anxious.   Nursing note and vitals reviewed.        Vital Signs (Most Recent):  Temp: 98 °F (36.7 °C) (07/02/19 1324)  Pulse: (!) 122 (07/02/19 1511)  Resp: 16 (07/02/19 1410)  BP: 113/69 (07/02/19 1326)  SpO2: (!) 94 % (07/02/19 1410) Vital Signs (24h Range):  Temp:  [98 °F (36.7 °C)-99.1 °F (37.3 °C)] 98 °F (36.7 °C)  Pulse:  [105-137] 122  Resp:  [15-20] 16  SpO2:  [93 %-100 %] 94 %  BP: ()/(61-91) 113/69     Weight: 65.7 kg (144 lb 13.5 oz)  Body mass index is 27.37 kg/m².      Intake/Output Summary (Last 24 hours) at 7/2/2019 1542  Last data filed at 7/2/2019 0845  Gross per 24 hour   Intake 1504.8 ml   Output 860 ml   Net 644.8 ml       Ventilator Data:          Hemodynamic Parameters:       Lines/Drains:       Introducer right internal jugular (Active)   Specific Qualities Capped 6/30/2019  7:10 AM   Dressing Status Clean;Dry;Biopatch in place;Intact 6/30/2019  7:10 AM   Dressing Intervention Dressing reinforced 6/29/2019  3:00 AM   Dressing Change Due 06/29/19 6/30/2019  3:00 AM   Daily Line Review Performed 6/30/2019   7:10 AM   Number of days:             Port A Cath Single Lumen 06/24/14 1200 right subclavian (Active)   Accessed by: Radha Lanza 6/24/2019  5:00 AM   Dressing Type Transparent 6/30/2019  7:10 AM   Dressing Status Biopatch in place;Clean;Dry;Intact 6/30/2019  7:10 AM   Dressing Intervention Dressing reinforced 6/29/2019  3:00 AM   Dressing Change Due 06/30/19 6/30/2019  3:00 AM   Line Status Infusing 6/30/2019  7:10 AM   Flush Performed Yes 6/30/2019  7:10 AM   Date to be Reflushed 06/18/19 6/29/2019  3:00 AM   Daily Line Review Performed 6/30/2019  7:10 AM   Type of Needle Nicole 6/30/2019  3:00 AM   Gauge 20 6/30/2019  3:00 AM   Needle Length 1 in 6/30/2019  3:00 AM   Needle Status Left in place 6/30/2019  3:00 AM   Needle Insertion Date 06/24/19 6/30/2019  3:00 AM   Needle Insertion Time 0500 6/26/2019  3:00 AM   Number of days: 1831            Percutaneous Central Line Insertion/Assessment - double lumen  06/18/19 0510 (Active)   Dressing biopatch in place;dressing dry and intact 6/30/2019  7:10 AM   Securement secured w/ sutures 6/30/2019  7:10 AM   Additional Site Signs no erythema;no warmth 6/30/2019  7:10 AM   Distal Patency/Care infusing 6/30/2019  7:10 AM   Proximal Patency/Care infusing 6/30/2019  7:10 AM   Waveform normal 6/30/2019  7:10 AM   Line Interventions line leveled/zeroed 6/30/2019  7:10 AM   Dressing Change Due 06/29/19 6/30/2019  3:00 AM   Daily Line Review Performed 6/30/2019  7:10 AM   Number of days: 12            Peripheral IV - Single Lumen 06/27/19 0917 22 G Right Forearm (Active)   Site Assessment Clean;Dry;Intact;No redness;No swelling 6/30/2019  7:10 AM   Line Status Infusing 6/30/2019  7:10 AM   Dressing Status Clean;Dry;Intact 6/30/2019  7:10 AM   Dressing Intervention Dressing reinforced 6/29/2019  3:00 AM   Dressing Change Due 07/01/19 6/30/2019  3:00 AM   Site Change Due 07/01/19 6/29/2019  7:00 PM   Reason Not Rotated Not due 6/30/2019  7:10 AM   Number of days: 3             Chest Tube 06/29/19 1655 1 Right Fourth intercostal space;Midaxillary 7 Fr. (Active)   Chest Tube WDL WDL 6/30/2019  7:10 AM   Function -20 cm H2O 6/30/2019  7:10 AM   Air Leak/Fluctuation air leak present 6/30/2019  7:10 AM   Safety all tubing connections taped;2 rubber-tipped hemostats w/ patient;all connections secured;suction checked 6/30/2019  7:10 AM   Securement tubing secured to body distal to insertion site w/ tape 6/30/2019  7:10 AM   Patency Intervention Tip/tilt 6/30/2019  7:10 AM   Drainage Description Serosanguineous 6/30/2019  7:10 AM   Dressing Appearance occlusive gauze dressing intact 6/30/2019  7:10 AM   Dressing Care dressing changed 6/30/2019 10:00 AM   Left Subcutaneous Emphysema none present 6/30/2019  7:10 AM   Right Subcutaneous Emphysema none present 6/30/2019  7:10 AM   Site Assessment Clean;Dry;Intact;No redness;No swelling;Ecchymotic 6/30/2019 10:00 AM   Surrounding Skin Dry;Intact 6/30/2019  7:10 AM   Output (mL) 0 mL 6/30/2019 10:00 AM   Number of days: 0            Y Chest Tube 3 and 4 06/18/19 1151 3 Left Pleural 19 Fr. 4 Left Mediastinal 19 Fr. (Active)   Function -20 cm H2O 6/30/2019  7:10 AM   Air Leak/Fluctuation air leak not present 6/30/2019  7:10 AM   Safety all tubing connections taped;2 rubber-tipped hemostats w/ patient;all connections secured;suction checked 6/30/2019  7:10 AM   Securement tubing secured to body distal to insertion site w/ tape 6/30/2019  7:10 AM   Left Subcutaneous Emphysema none present 6/30/2019  7:10 AM   Right Subcutaneous Emphysema none present 6/30/2019  7:10 AM   Patency Intervention Tip/tilt 6/30/2019  7:10 AM   Drainage Description 3 Serosanguineous 6/30/2019  7:10 AM   Tube 3 Dressing Appearance occlusive gauze dressing intact 6/30/2019  7:10 AM   Tube 3 Dressing Care dressing changed 6/30/2019 10:00 AM   Site Assessment 3 Clean;Dry;Intact 6/30/2019 10:00 AM   Surrounding Skin 3 Dry;Intact 6/30/2019  7:10 AM   Drainage Description 4  Serosanguineous 6/30/2019  7:10 AM   Tube 4 Dressing Appearance occlusive gauze dressing intact 6/30/2019  7:10 AM   Tube 4 Dressing Care dressing changed 6/30/2019 10:00 AM   Site Assessment 4 Clean;Dry;Intact 6/30/2019 10:00 AM   Surrounding Skin 4 Dry;Intact 6/30/2019  7:10 AM   Output (mL) 0 mL 6/30/2019 10:00 AM   Number of days: 11       Significant Labs:  CBC:  Recent Labs   Lab 07/02/19  0545   WBC 9.95   RBC 2.82*   HGB 7.9*   HCT 25.5*   *   MCV 90   MCH 28.0   MCHC 31.0*     BMP:  Recent Labs   Lab 07/02/19  0545   *   K 4.5      CO2 19*   BUN 21*   CREATININE 1.1   CALCIUM 8.5*      Tacrolimus Levels:  Recent Labs   Lab 07/02/19  0545   TACROLIMUS 9.0     Microbiology:  Microbiology Results (last 7 days)     Procedure Component Value Units Date/Time    Fungus culture [[id number]]  (Abnormal) Collected:  06/18/19 0930    Order Status:  Completed Specimen:  Tissue from Lung, Left Updated:  06/27/19 1055     Fungus (Mycology) Culture CANDIDA ALBICANS  Moderate      Narrative:       Donor lung    Fungus culture [[id number]]  (Abnormal) Collected:  06/23/19 1735    Order Status:  Completed Specimen:  Respiratory from Bronchial Wash Updated:  06/27/19 1048     Fungus (Mycology) Culture BRANDI GLABRATA  Few      Culture, Respiratory [[id number]]  (Susceptibility) Collected:  06/25/19 0900    Order Status:  Completed Specimen:  Respiratory from Bronchial Wash Updated:  06/27/19 0905     Respiratory Culture No S aureus isolated.      PSEUDOMONAS AERUGINOSA   Few       Gram Stain (Respiratory) <10 epithelial cells per low power field.     Gram Stain (Respiratory) Many WBC's     Gram Stain (Respiratory) No organisms seen    Narrative:       Bronchial Wash    AFB Culture & Smear [[id number]] Collected:  06/25/19 0900    Order Status:  Completed Specimen:  Respiratory from Bronchial Wash Updated:  06/26/19 2127     AFB Culture & Smear Culture in progress     AFB CULTURE STAIN No acid fast bacilli  seen.    Narrative:       Bronchial Wash    Fungus culture [[id number]] Collected:  06/25/19 0900    Order Status:  Completed Specimen:  Respiratory from Bronchial Wash Updated:  06/26/19 1102     Fungus (Mycology) Culture Culture in progress    Narrative:       Bronchial Wash    Fungus culture [[id number]] Collected:  06/20/19 0808    Order Status:  Completed Specimen:  Respiratory from Bronchial Wash Updated:  06/26/19 1101     Fungus (Mycology) Culture Culture in progress    Culture, Respiratory with Gram Stain [[id number]]  (Susceptibility) Collected:  06/23/19 1735    Order Status:  Completed Specimen:  Respiratory from Bronchial Wash Updated:  06/26/19 0752     Respiratory Culture No S aureus isolated.      PSEUDOMONAS AERUGINOSA   Few  Normal respiratory anibal also present       Gram Stain (Respiratory) <10 epithelial cells per low power field.     Gram Stain (Respiratory) Few WBC's     Gram Stain (Respiratory) Rare Gram negative rods          I have reviewed all pertinent labs within the past 24 hours.    Diagnostic Results:  Chest X-Ray: Right-sided pigtail chest tube again noted along with 2 left-sided chest tubes.  No significant change in the cardiopulmonary status.  Continued interstitial accentuation bilaterally with a focal opacity again seen at the right upper thorax.  Continued small pneumothorax bilaterally.           Assessment/Plan:     * S/P lung transplant  POD#14 s/p bilateral lung transplant (re-transplant) for CARLOS EDUARDO. 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 PGD 0. S/p bronch and extubated to HFNC on POD#2, but was re-intubated 6/21 for hypercapnic respiratory failure. Right pigtail placed for PTX on 6/29. Discussed removing remaining chest tubes with CTS today.     Continue PT/OT/IS/CPT. Diet pureed (no fluids) with repeat MBSS tomorrow. Continue immunosuppression and prophylaxis. OR bronchoscopy tentatively scheduled for 7/5.     Immunosuppression  Previously on tacrolimus, MMF, and daily  prednisone. S/p solumedrol in OR, basiliximab on POD#0 and POD#4. Continue MMF, tacrolimus, and steroid taper. Monitor daily tacrolimus levels and adjust dose as needed.       Prophylactic antibiotic  CMV D-/R+. Continue OIP with ganciclovir, fluconazole, and dapsone. Bronch wash 6/20 and 6/23 with  Pseudomonas and Candida Glabrata. Continue vancomycin given history of MRSA in April pre-transplant. Continue ciprofloxacin 400 mg Q8hrs.    Acute blood loss anemia  Received 6u PRBCs, 2u plts, 1 cryo, and 4 FFP angie-operatively. Will be conservative with future transfusions.    Adrenal cortical steroids causing adverse effect in therapeutic use  Endocrinology consulted, appreciate recs.    Chronic pain with opiate use  Anesthesia consulted for pain mgmt. Continue aggressive bowel regimen. VBG PRN to watch for development of hypercapnia. Pain management following, appreciate recs.     CF related Pancreatic insufficiency  Will restart home creon once patient tolerating pureed diet.     Steroid-induced hyperglycemia  Endocrine following, appreciate recs.     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 Continue Ciprofloxacin, Tobramycin x1 on 6/22.       Shama Canales PA-C  Lung Transplant  [facility]

## 2019-07-02 NOTE — ASSESSMENT & PLAN NOTE
BG goal 140 - 180.     Discontinue IV insulin.   Moderate dose correction scale  BG monitoring to AC/HS/0200 since diet advanced    Discharge planning: SAUR[facility]

## 2019-07-02 NOTE — SUBJECTIVE & OBJECTIVE
"Interval HPI:   Overnight events:Transferred to TSU. BG at or below goal; insulin infusion suspended per orders. Prednisone 20 mg; steroids per primary.   Eating:   <25% unsweetened apple sauce - bites throughout the day   Nausea: No  Hypoglycemia and intervention: No  Fever: No  TPN and/or TF: No      BP (P) 126/87 (BP Location: Right arm, Patient Position: Lying)   Pulse (!) 136   Temp (P) 98.7 °F (37.1 °C) (Oral)   Resp 18   Ht 5' 1" (1.549 m)   Wt 65.7 kg (144 lb 13.5 oz)   LMP 10/02/2016   SpO2 99%   Breastfeeding? No   BMI 27.37 kg/m²     Labs Reviewed and Include    Recent Labs   Lab 07/02/19  0545      CALCIUM 8.5*   ALBUMIN 2.4*   PROT 5.4*   *   K 4.5   CO2 19*      BUN 21*   CREATININE 1.1   ALKPHOS 249*   ALT 99*   AST 28   BILITOT 0.3     Lab Results   Component Value Date    WBC 9.95 07/02/2019    HGB 7.9 (L) 07/02/2019    HCT 25.5 (L) 07/02/2019    MCV 90 07/02/2019     (L) 07/02/2019     No results for input(s): TSH, FREET4 in the last 168 hours.  Lab Results   Component Value Date    HGBA1C 4.7 01/09/2019       Nutritional status:   Body mass index is 27.37 kg/m².  Lab Results   Component Value Date    ALBUMIN 2.4 (L) 07/02/2019    ALBUMIN 2.5 (L) 07/01/2019    ALBUMIN 2.1 (L) 06/30/2019     Lab Results   Component Value Date    PREALBUMIN 15 (L) 05/29/2014    PREALBUMIN 11 (L) 05/09/2014    PREALBUMIN 18 (L) 03/19/2014       Estimated Creatinine Clearance: 64.9 mL/min (based on SCr of 1.1 mg/dL).    Accu-Checks  Recent Labs     06/30/19  1219 06/30/19  1519 07/01/19  0006 07/01/19  0327 07/01/19  0803 07/01/19  1229 07/01/19  1704 07/01/19  2106 07/02/19  0048 07/02/19  0748   POCTGLUCOSE 275* 305* 182* 203* 191* 267* 315* 103 143* 172*       Current Medications and/or Treatments Impacting Glycemic Control  Immunotherapy:    Immunosuppressants         Stop Route Frequency     mycophenolate (CELLCEPT) 500 mg in dextrose 5 % 100 mL IVPB      -- IV 2 times daily    "     Steroids:   Hormones (From admission, onward)    Start     Stop Route Frequency Ordered    07/02/19 0900  predniSONE tablet 20 mg      -- Oral Daily 07/01/19 1148    06/26/19 1827  dexAMETHasone sodium phos (PF) 10 mg/mL injection     Note to Pharmacy:  Created by cabinet override    06/27 0629 06/26/19 1827        Pressors:    Autonomic Drugs (From admission, onward)    None        Hyperglycemia/Diabetes Medications:   Antihyperglycemics (From admission, onward)    Start     Stop Route Frequency Ordered    07/01/19 0830  insulin regular (Humulin R) 100 Units in sodium chloride 0.9% 100 mL infusion      -- IV Continuous 07/01/19 0724    06/28/19 1502  insulin aspart U-100 pen 0-10 Units      -- SubQ As needed (PRN) 06/28/19 1403

## 2019-07-02 NOTE — PLAN OF CARE
Problem: Physical Therapy Goal  Goal: Physical Therapy Goal  Goals to be met by: 19    Patient will increase functional independence with mobility by performin. Supine to sit with Stand-by Assistance -not met  2. Sit to stand transfer with Supervision -not met  3. Gait  x 250 feet with Supervision -not met     Pt progressing towards goals.  Continue with POC

## 2019-07-02 NOTE — PLAN OF CARE
Problem: Adult Inpatient Plan of Care  Goal: Plan of Care Review  Outcome: Ongoing (interventions implemented as appropriate)  -Pt AAOx4  -Vital signs stable; pt remains on RA  -Telemetry monitoring ST ; HR ranging in the low 100s to 130s  -BG monitoring ACHS and 0200; BG HS was 103.  Continuous insulin gtt tuned off per order.  Will recheck BG at 0200.  -Incision under breast KENNETH with dermabind  -All CT to Wall wall suction, see flowsheet for output values  -PRN ativan, dilaudid, zofran, and phenergan given  -Pt premedicated with IV benadryl prior top receiving IV vanc per pt's request  -Contact precautions maintained  -Fall precautions maintained, non skid socks worn  -No acute events/falls/injuries this shift  -Pt aware to call for help with ambulating.    -Bed lowered, locked, siderails up x2, and call bell in reach.    See flowsheet for assessment findings.  Will continue to monitor pt.

## 2019-07-02 NOTE — SUBJECTIVE & OBJECTIVE
Subjective:     Interval History: No acute events overnight. Patient OOBTC on room air. Pain well controlled. Cough stronger, but still reports difficulty clearing secretions. No BM, but reports flatus. Tolerating pureed diet.     Continuous Infusions:    Scheduled Meds:   acetaminophen  650 mg Oral Q6H    acetylcysteine 200 mg/ml (20%)  4 mL Nebulization Q12H    ciprofloxacin  400 mg Intravenous Q8H    enoxaparin  40 mg Subcutaneous Daily    famotidine  20 mg Oral BID    fluconazole  400 mg Oral Daily    gabapentin  300 mg Oral TID    ganciclovir (CYTOVENE) IVPB  5 mg/kg Intravenous Q12H    levalbuterol  1.25 mg Nebulization Q6H WAKE    lidocaine  1 patch Transdermal Q24H    lidocaine  1 patch Transdermal Q24H    metoprolol tartrate  25 mg Oral TID    morphine  15 mg Oral Q6H    mycophenolate (CELLCEPT) IVPB  500 mg Intravenous BID    polyethylene glycol  17 g Per NG tube BID    predniSONE  20 mg Oral Daily    QUEtiapine  25 mg Oral Daily    QUEtiapine  50 mg Oral QHS    sodium chloride 7%  4 mL Nebulization Q12H    vancomycin (VANCOCIN) IVPB  750 mg Intravenous Q12H     PRN Meds:bisacodyl, Dextrose 10% Bolus, Dextrose 10% Bolus, diphenhydrAMINE, glucagon (human recombinant), glucose, glucose, HYDROmorphone, insulin aspart U-100, lactulose, lipase-protease-amylase (VIOKACE) 20,880-78,300- 78,300 units, lorazepam, magnesium sulfate IVPB, metoclopramide HCl, metoprolol, naloxone, OLANZapine, ondansetron, ondansetron, potassium chloride in water **AND** potassium chloride in water **AND** potassium chloride in water, promethazine (PHENERGAN) IVPB    Review of patient's allergies indicates:   Allergen Reactions    Albuterol Palpitations    Colistin Anaphylaxis    Vancomycin analogues      Infusion reaction that does not resolve with slowing  Pt. States she can tolerate it when given with 50 mg Benadryl and ran over 3 hours    Neupogen [filgrastim] Other (See Comments)     Ostealgia after five  daily doses of 300 mcg.      Bactrim [sulfamethoxazole-trimethoprim] Hives    Ceftazidime Hives     Pt stated can tolerate cefapine not ceftazidime    Ceftazidime     Dronabinol Other (See Comments)     Mental changes/hallucinations    Haldol [haloperidol lactate] Other (See Comments)     Seizure like activity    Nsaids (non-steroidal anti-inflammatory drug)      Cannot have due to lung transplant    Adhesive Rash     Cloth tape- please use tegaderm or paper tape    Aztreonam Rash    Ciprofloxacin Nausea And Vomiting     Projectile N/V, per patient.  Unwilling to retry therapy.       Review of Systems   Constitutional: Negative for appetite change, chills, diaphoresis and fever.   HENT: Negative for congestion, postnasal drip, rhinorrhea, sinus pressure and sinus pain.    Respiratory: Positive for cough and shortness of breath. Negative for wheezing.    Cardiovascular: Positive for chest pain (insertion site). Negative for palpitations and leg swelling.   Gastrointestinal: Negative for abdominal distention, diarrhea, nausea and vomiting.   Genitourinary: Negative for decreased urine volume, difficulty urinating, dysuria and urgency.   Musculoskeletal: Positive for back pain (chronic).   Skin: Negative for rash and wound.   Allergic/Immunologic: Positive for immunocompromised state.   Neurological: Positive for weakness. Negative for dizziness, syncope and headaches.   Psychiatric/Behavioral: Negative for confusion. The patient is nervous/anxious.      Objective:   Physical Exam   Constitutional: She appears well-developed and well-nourished. She is easily aroused. No distress.   HENT:   Head: Normocephalic and atraumatic.   Nose: Nose normal.   Eyes: Conjunctivae and EOM are normal. No scleral icterus.   Neck: No JVD present.   Right IJ CVC, CDI   Cardiovascular: Regular rhythm. Tachycardia present. Exam reveals no friction rub.   No murmur heard.  Pulmonary/Chest: No accessory muscle usage. No respiratory  distress. She has decreased breath sounds in the right lower field and the left lower field. She has no wheezes. She has rhonchi in the right upper field and the left upper field. She has no rales.   Bilateral chest tubes to suction, no air leak   Abdominal: Soft. Bowel sounds are normal. She exhibits no distension. There is no tenderness.   Musculoskeletal: She exhibits no edema.   Neurological: She is alert and easily aroused. No cranial nerve deficit.   Skin: Skin is warm and dry. No rash noted. She is not diaphoretic. No erythema.   Psychiatric: Her mood appears anxious.   Nursing note and vitals reviewed.        Vital Signs (Most Recent):  Temp: 98 °F (36.7 °C) (07/02/19 1324)  Pulse: (!) 122 (07/02/19 1511)  Resp: 16 (07/02/19 1410)  BP: 113/69 (07/02/19 1326)  SpO2: (!) 94 % (07/02/19 1410) Vital Signs (24h Range):  Temp:  [98 °F (36.7 °C)-99.1 °F (37.3 °C)] 98 °F (36.7 °C)  Pulse:  [105-137] 122  Resp:  [15-20] 16  SpO2:  [93 %-100 %] 94 %  BP: ()/(61-91) 113/69     Weight: 65.7 kg (144 lb 13.5 oz)  Body mass index is 27.37 kg/m².      Intake/Output Summary (Last 24 hours) at 7/2/2019 1542  Last data filed at 7/2/2019 0845  Gross per 24 hour   Intake 1504.8 ml   Output 860 ml   Net 644.8 ml       Ventilator Data:          Hemodynamic Parameters:       Lines/Drains:       Introducer right internal jugular (Active)   Specific Qualities Capped 6/30/2019  7:10 AM   Dressing Status Clean;Dry;Biopatch in place;Intact 6/30/2019  7:10 AM   Dressing Intervention Dressing reinforced 6/29/2019  3:00 AM   Dressing Change Due 06/29/19 6/30/2019  3:00 AM   Daily Line Review Performed 6/30/2019  7:10 AM   Number of days:             Port A Cath Single Lumen 06/24/14 1200 right subclavian (Active)   Accessed by: Radha Lanza 6/24/2019  5:00 AM   Dressing Type Transparent 6/30/2019  7:10 AM   Dressing Status Biopatch in place;Clean;Dry;Intact 6/30/2019  7:10 AM   Dressing Intervention Dressing reinforced 6/29/2019   3:00 AM   Dressing Change Due 06/30/19 6/30/2019  3:00 AM   Line Status Infusing 6/30/2019  7:10 AM   Flush Performed Yes 6/30/2019  7:10 AM   Date to be Reflushed 06/18/19 6/29/2019  3:00 AM   Daily Line Review Performed 6/30/2019  7:10 AM   Type of Needle Nicole 6/30/2019  3:00 AM   Gauge 20 6/30/2019  3:00 AM   Needle Length 1 in 6/30/2019  3:00 AM   Needle Status Left in place 6/30/2019  3:00 AM   Needle Insertion Date 06/24/19 6/30/2019  3:00 AM   Needle Insertion Time 0500 6/26/2019  3:00 AM   Number of days: 1831            Percutaneous Central Line Insertion/Assessment - double lumen  06/18/19 0510 (Active)   Dressing biopatch in place;dressing dry and intact 6/30/2019  7:10 AM   Securement secured w/ sutures 6/30/2019  7:10 AM   Additional Site Signs no erythema;no warmth 6/30/2019  7:10 AM   Distal Patency/Care infusing 6/30/2019  7:10 AM   Proximal Patency/Care infusing 6/30/2019  7:10 AM   Waveform normal 6/30/2019  7:10 AM   Line Interventions line leveled/zeroed 6/30/2019  7:10 AM   Dressing Change Due 06/29/19 6/30/2019  3:00 AM   Daily Line Review Performed 6/30/2019  7:10 AM   Number of days: 12            Peripheral IV - Single Lumen 06/27/19 0917 22 G Right Forearm (Active)   Site Assessment Clean;Dry;Intact;No redness;No swelling 6/30/2019  7:10 AM   Line Status Infusing 6/30/2019  7:10 AM   Dressing Status Clean;Dry;Intact 6/30/2019  7:10 AM   Dressing Intervention Dressing reinforced 6/29/2019  3:00 AM   Dressing Change Due 07/01/19 6/30/2019  3:00 AM   Site Change Due 07/01/19 6/29/2019  7:00 PM   Reason Not Rotated Not due 6/30/2019  7:10 AM   Number of days: 3            Chest Tube 06/29/19 1655 1 Right Fourth intercostal space;Midaxillary 7 Fr. (Active)   Chest Tube WDL WDL 6/30/2019  7:10 AM   Function -20 cm H2O 6/30/2019  7:10 AM   Air Leak/Fluctuation air leak present 6/30/2019  7:10 AM   Safety all tubing connections taped;2 rubber-tipped hemostats w/ patient;all connections  secured;suction checked 6/30/2019  7:10 AM   Securement tubing secured to body distal to insertion site w/ tape 6/30/2019  7:10 AM   Patency Intervention Tip/tilt 6/30/2019  7:10 AM   Drainage Description Serosanguineous 6/30/2019  7:10 AM   Dressing Appearance occlusive gauze dressing intact 6/30/2019  7:10 AM   Dressing Care dressing changed 6/30/2019 10:00 AM   Left Subcutaneous Emphysema none present 6/30/2019  7:10 AM   Right Subcutaneous Emphysema none present 6/30/2019  7:10 AM   Site Assessment Clean;Dry;Intact;No redness;No swelling;Ecchymotic 6/30/2019 10:00 AM   Surrounding Skin Dry;Intact 6/30/2019  7:10 AM   Output (mL) 0 mL 6/30/2019 10:00 AM   Number of days: 0            Y Chest Tube 3 and 4 06/18/19 1151 3 Left Pleural 19 Fr. 4 Left Mediastinal 19 Fr. (Active)   Function -20 cm H2O 6/30/2019  7:10 AM   Air Leak/Fluctuation air leak not present 6/30/2019  7:10 AM   Safety all tubing connections taped;2 rubber-tipped hemostats w/ patient;all connections secured;suction checked 6/30/2019  7:10 AM   Securement tubing secured to body distal to insertion site w/ tape 6/30/2019  7:10 AM   Left Subcutaneous Emphysema none present 6/30/2019  7:10 AM   Right Subcutaneous Emphysema none present 6/30/2019  7:10 AM   Patency Intervention Tip/tilt 6/30/2019  7:10 AM   Drainage Description 3 Serosanguineous 6/30/2019  7:10 AM   Tube 3 Dressing Appearance occlusive gauze dressing intact 6/30/2019  7:10 AM   Tube 3 Dressing Care dressing changed 6/30/2019 10:00 AM   Site Assessment 3 Clean;Dry;Intact 6/30/2019 10:00 AM   Surrounding Skin 3 Dry;Intact 6/30/2019  7:10 AM   Drainage Description 4 Serosanguineous 6/30/2019  7:10 AM   Tube 4 Dressing Appearance occlusive gauze dressing intact 6/30/2019  7:10 AM   Tube 4 Dressing Care dressing changed 6/30/2019 10:00 AM   Site Assessment 4 Clean;Dry;Intact 6/30/2019 10:00 AM   Surrounding Skin 4 Dry;Intact 6/30/2019  7:10 AM   Output (mL) 0 mL 6/30/2019 10:00 AM   Number  of days: 11       Significant Labs:  CBC:  Recent Labs   Lab 07/02/19  0545   WBC 9.95   RBC 2.82*   HGB 7.9*   HCT 25.5*   *   MCV 90   MCH 28.0   MCHC 31.0*     BMP:  Recent Labs   Lab 07/02/19  0545   *   K 4.5      CO2 19*   BUN 21*   CREATININE 1.1   CALCIUM 8.5*      Tacrolimus Levels:  Recent Labs   Lab 07/02/19  0545   TACROLIMUS 9.0     Microbiology:  Microbiology Results (last 7 days)     Procedure Component Value Units Date/Time    Fungus culture [[id number]]  (Abnormal) Collected:  06/18/19 0930    Order Status:  Completed Specimen:  Tissue from Lung, Left Updated:  06/27/19 1055     Fungus (Mycology) Culture CANDIDA ALBICANS  Moderate      Narrative:       Donor lung    Fungus culture [[id number]]  (Abnormal) Collected:  06/23/19 1735    Order Status:  Completed Specimen:  Respiratory from Bronchial Wash Updated:  06/27/19 1048     Fungus (Mycology) Culture BRANDI GLABRATA  Few      Culture, Respiratory [[id number]]  (Susceptibility) Collected:  06/25/19 0900    Order Status:  Completed Specimen:  Respiratory from Bronchial Wash Updated:  06/27/19 0905     Respiratory Culture No S aureus isolated.      PSEUDOMONAS AERUGINOSA   Few       Gram Stain (Respiratory) <10 epithelial cells per low power field.     Gram Stain (Respiratory) Many WBC's     Gram Stain (Respiratory) No organisms seen    Narrative:       Bronchial Wash    AFB Culture & Smear [[id number]] Collected:  06/25/19 0900    Order Status:  Completed Specimen:  Respiratory from Bronchial Wash Updated:  06/26/19 2127     AFB Culture & Smear Culture in progress     AFB CULTURE STAIN No acid fast bacilli seen.    Narrative:       Bronchial Wash    Fungus culture [[id number]] Collected:  06/25/19 0900    Order Status:  Completed Specimen:  Respiratory from Bronchial Wash Updated:  06/26/19 1102     Fungus (Mycology) Culture Culture in progress    Narrative:       Bronchial Wash    Fungus culture [[id number]] Collected:  06/20/19  0808    Order Status:  Completed Specimen:  Respiratory from Bronchial Wash Updated:  06/26/19 1101     Fungus (Mycology) Culture Culture in progress    Culture, Respiratory with Gram Stain [[id number]]  (Susceptibility) Collected:  06/23/19 1735    Order Status:  Completed Specimen:  Respiratory from Bronchial Wash Updated:  06/26/19 0752     Respiratory Culture No S aureus isolated.      PSEUDOMONAS AERUGINOSA   Few  Normal respiratory anibal also present       Gram Stain (Respiratory) <10 epithelial cells per low power field.     Gram Stain (Respiratory) Few WBC's     Gram Stain (Respiratory) Rare Gram negative rods          I have reviewed all pertinent labs within the past 24 hours.    Diagnostic Results:  Chest X-Ray: Right-sided pigtail chest tube again noted along with 2 left-sided chest tubes.  No significant change in the cardiopulmonary status.  Continued interstitial accentuation bilaterally with a focal opacity again seen at the right upper thorax.  Continued small pneumothorax bilaterally.

## 2019-07-02 NOTE — PROGRESS NOTES
Update:  SW met with pt and  in pt's room in order to provide continuity of care and support. Pt was AAOx4 but reports being a lot of pain from her second lung transplant that recently occurred a few weeks ago. Pt appeared completely covered by a thick blanket aside from her face. Pt had trouble engaging and was closing her eyes during most of the SW visit. Pt states that she was just transferred to the [facility] yesterday. SW engaged with pt's  who states that he is working remotely while in pt's hospital room. Per , pt's MIL is also visiting and providing support. Pt's  reports that they recently moved down to the [facility] from [place] in order to be closer to the hospital. Pt and  denied any additional concerns. SW remains available for continued psychosocial support, education, resources, and additional d/c planning as needed.

## 2019-07-02 NOTE — PROGRESS NOTES
Unable to give pt PO Morphine at scheduled time, due to pt working with PT. Upon entering room pt crying and moaning in pain and refusing to take PO Morphine. Says unable to swallow meds at this time. Informed Chambers, NP, says ok to give IV Dilaudid now. Will give and continue to monitor.

## 2019-07-02 NOTE — ASSESSMENT & PLAN NOTE
Anesthesia consulted for pain mgmt. Continue aggressive bowel regimen. VBG PRN to watch for development of hypercapnia. Pain management following, appreciate recs.

## 2019-07-02 NOTE — PLAN OF CARE
Problem: Adult Inpatient Plan of Care  Goal: Plan of Care Review  Fall education reviewed with pt. Instructed to call if assistance needed, verbalized understanding.   Cardiac monitoring in progress, currently ST.   Sats high 90's-100% on RA. Respiratory treatments continued. Left CT's DC'd today, right CT still in place. Bronch scheduled for Friday.  Pureed diet continued. Pt not eating, only eating apple sauce with meds. MBSS scheduled for tomorrow.   BG checked AC/HS. Last , 2 units SSI coverage given.   Reports of constant pain. Requesting IV Dilaudid only. Refused to take scheduled Morphine (see previous note). Also refusing scheduled Tylenol.   Requesting Ativan be brought in every 4 hrs.   Up in the chair for several hours today and worked with PT. Took a lot of coaxing to work with PT, but did do it.   Afebrile. Cipro continued. Trough 23.1, hold 2000 dose of Vanc, per FELICIA Laguerre. Daily labs monitored.

## 2019-07-02 NOTE — PT/OT/SLP PROGRESS
Physical Therapy Treatment    Patient Name:  Juanita Ibarra   MRN:  [id number]    Recommendations:     Discharge Recommendations:  home health PT   Discharge Equipment Recommendations: none   Barriers to discharge: Decreased caregiver support    Assessment:     Juanita Ibarra is a 29 y.o. female admitted with a medical diagnosis of S/P lung transplant.  She presents with the following impairments/functional limitations:  weakness, impaired endurance, impaired self care skills, impaired functional mobilty, gait instability, impaired balance, impaired cardiopulmonary response to activity, impaired cognition .  Pt completed Therapy today with no adverse reactions or reported changes in pain status.  Pt is reserved and requires encouragement to complete treatment.  Pt requires vcs for TherEx but demonstrates understanding after instruction.  Pt would benefit from continued skilled PT to address functional mobility and gait.     Rehab Prognosis: Good; patient would benefit from acute skilled PT services to address these deficits and reach maximum level of function.    Recent Surgery: Procedure(s) (LRB):  REDO BILATERAL LUNG TRANSPLANT; [place] (Bilateral) 14 Days Post-Op    Plan:     During this hospitalization, patient to be seen 4 x/week to address the identified rehab impairments via gait training, therapeutic activities, therapeutic exercises and progress toward the following goals:    · Plan of Care Expires:  07/17/19    Subjective     Chief Complaint: Nausea  Patient/Family Comments/goals: agreeable to treatment  Pain/Comfort:  · Pain Rating 1: 0/10  · Pain Rating Post-Intervention 1: 0/10      Objective:     Communicated with nurse prior to session.  Patient found up in chair with chest tube, pulse ox (continuous), telemetry, central line, peripheral IV upon PT entry to room.     General Precautions: Standard, fall   Orthopedic Precautions:N/A   Braces: N/A     Functional Mobility:  · Bed  Mobility:     · Scooting: stand by assistance  · Transfers:     · Sit to Stand:  minimum assistance and of 2 persons with no AD and a second trial was completed with NO AD ModAx1 with pt requiring vcs for positioning   · Static Standing 2 trials of 30sec Мария (first trial x2 second trial x1) pt demonstrates decreased muscle endurance and decreased muscle strength      AM-PAC 6 CLICK MOBILITY  Turning over in bed (including adjusting bedclothes, sheets and blankets)?: 3  Sitting down on and standing up from a chair with arms (e.g., wheelchair, bedside commode, etc.): 2  Moving from lying on back to sitting on the side of the bed?: 2  Moving to and from a bed to a chair (including a wheelchair)?: 2  Need to walk in hospital room?: 2  Climbing 3-5 steps with a railing?: 1  Basic Mobility Total Score: 12       Therapeutic Activities and Exercises:   Implementation and Education of HEP was provided for the pt and her family.  Additional time was provided for questions regarding POC and HEP for the family and pt  Seated: BERNARDINO ALLEN (1x5)[place]   Updated white board with appropriate PT mobility information for medical team notification    Patient left up in chair with all lines intact and call button in reach..    GOALS:   Multidisciplinary Problems     Physical Therapy Goals        Problem: Physical Therapy Goal    Goal Priority Disciplines Outcome Goal Variances Interventions   Physical Therapy Goal     PT, PT/OT      Description:  Goals to be met by: 19    Patient will increase functional independence with mobility by performin. Supine to sit with Stand-by Assistance -not met  2. Sit to stand transfer with Supervision -not met  3. Gait  x 250 feet with Supervision -not met                      Time Tracking:     PT Received On: 19  PT Start Time: 1227     PT Stop Time: 1258  PT Total Time (min): 31 min     Billable Minutes: Therapeutic Activity 8 and Therapeutic Exercise 23    Treatment Type:  Treatment  PT/PTA: PTA     PTA Visit Number: 1     Blaine Klein, SPTA  07/02/2019   I certify that I was present in the room directing the student in service delivery and guiding them using my skilled judgment. As the co-signing therapist I have reviewed the students documentation and am responsible for the treatment, assessment, and plan. Pradip Villeda PTA  7/2/2019

## 2019-07-02 NOTE — PROGRESS NOTES
"[facility]  Adult Nutrition  Progress Note    SUMMARY       Recommendations    Recommendation/Intervention: 1.) Continue puree diet; SLP to recommend texture. 2.) If liquids allowed, suggest Boost Plus TID. 3.) Daily weights. 4.) If appetite remains poor, pt may benefit from nocturnal TF.   Goals: 1.) Diet to advance to include liquids by follow up. 2.) Pt to consume/tolerate >75% EEN and EPN by follow up.   Nutrition Goal Status: progressing towards goal  Communication of RD Recs: (POC)    Reason for Assessment    Reason For Assessment: RD follow-up  Diagnosis: transplant/postoperative complications(s/p redo BLTx 6/18)  Relevant Medical History: CF s/p BLTx 6/2014, HTN, seizures  Interdisciplinary Rounds: did not attend  General Information Comments: Pt laying in bed receiving pain medication. Spouse and mother in law to bedside. Pt reports not feeling well. C/o nausea. Observed lunch to bedside untouched. Pt reports nausea & pain attributing to poor PO intake. She also c/o some constipation. No other GI distress. SLP swallow evaluation with pt unable to have thin or nectar thickened liquids. Unable to send ONS at this time. SLP following. Pt would like ONS when diet advanced. TF cancelled 6/29 with diet advancement. Pt refused NFPE at this time. Pt appears to be well nourished.   Nutrition Discharge Planning: Post transplant nutrition education provided. Food safety/drug interactions emphasized. General healthy/low salt diet recommended. RD name/contact information, education material left.  No other needs identified. Caregiver present.      Nutrition Risk Screen    Nutrition Risk Screen: dysphagia or difficulty swallowing    Nutrition/Diet History    Spiritual, Cultural Beliefs, Mu-ism Practices, Values that Affect Care: no  Factors Affecting Nutritional Intake: NPO, difficulty/impaired swallowing    Anthropometrics    Temp: 98 °F (36.7 °C)  Height Method: Stated  Height: 5' 1" (154.9 " cm)  Height (inches): 61 in  Weight Method: Bed Scale  Weight: 65.7 kg (144 lb 13.5 oz)  Weight (lb): 144.84 lb  Ideal Body Weight (IBW), Female: 105 lb  % Ideal Body Weight, Female (lb): 123.77 lb  BMI (Calculated): 24.6  BMI Grade: 18.5-24.9 - normal       Lab/Procedures/Meds    Pertinent Labs Reviewed: reviewed  Pertinent Labs Comments: Na 131, BUN 21, Ca 8.5, Mg 1.4, , ALT 99  Pertinent Medications Reviewed: reviewed  Pertinent Medications Comments: cipro, famotidine, nebulizer, morphine, mycophenolate, miralax, prednisone, vancomycin      Estimated/Assessed Needs    Weight Used For Calorie Calculations: 65.7 kg (144 lb 13.5 oz)  Energy Calorie Requirements (kcal): 1643  Energy Need Method: Starke-St Jeor(x 1.25 PAL)  Protein Requirements: 79-99(g/day)  Weight Used For Protein Calculations: 65.7 kg (144 lb 13.5 oz)(1.2-1.5 g/kg)  Fluid Requirements (mL): 1 mL/kcal or per MD  Estimated Fluid Requirement Method: RDA Method(or per MD)  RDA Method (mL): 1643         Nutrition Prescription Ordered    Current Diet Order: puree  Nutrition Order Comments: no liquids  Current Nutrition Support Formula Ordered: (-)  Current Nutrition Support Rate Ordered: 0 (ml)  Current Nutrition Support Frequency Ordered: -    Evaluation of Received Nutrient/Fluid Intake    Enteral Calories (kcal): 0  Enteral Protein (gm): 0  Enteral (Free Water) Fluid (mL): 0  Other Calories (kcal): 0  Total Calories (kcal): 0  % Kcal Needs: 0  % Protein Needs: 0  I/O: -2.5L since 6/18  Energy Calories Required: (-)  Protein Required: (-)  Fluid Required: (-)  Comments: LBM 6/30  Tolerance: (-)  % Intake of Estimated Energy Needs: 0 - 25 %  % Meal Intake: 0 - 25 %    Nutrition Risk    Level of Risk/Frequency of Follow-up: low     Assessment and Plan  Nutrition Problem  Increased nutrient needs  Swallowing difficulty     Related to (etiology):   Physiological causes related to healing  Mechanical causes from surgery     Signs and Symptoms (as  evidenced by):   S/p redo BLTx 6/18   Abnormal findings from barium swallow evaluation     Interventions/Recommendations (treatment strategy):  Collaboration and referral of nutrition care     Nutrition Diagnosis Status:   Continues   New     Nutrition Problem  Inadequate energy intake     Related to (etiology):   Decreased ability to consume sufficient energy     Signs and Symptoms (as evidenced by):   NPO with no alternative means of nutrition at this time     Interventions/Recommendations (treatment strategy):  Collaboration of nutrition care with other providers     Nutrition Diagnosis Status:   Resolved       Monitor and Evaluation    Food and Nutrient Intake: energy intake, food and beverage intake  Food and Nutrient Adminstration: diet order  Knowledge/Beliefs/Attitudes: food and nutrition knowledge/skill  Physical Activity and Function: nutrition-related ADLs and IADLs  Anthropometric Measurements: weight, weight change, body mass index  Biochemical Data, Medical Tests and Procedures: electrolyte and renal panel, gastrointestinal profile, glucose/endocrine profile, inflammatory profile  Nutrition-Focused Physical Findings: overall appearance     Malnutrition Assessment   Unable to perform at this time. Pt refused. Will attempt at follow up.     Nutrition Follow-Up    RD Follow-up?: Yes

## 2019-07-02 NOTE — PLAN OF CARE
Problem: SLP Goal  Goal: SLP Goal  Speech Language Pathology Goals  Goals expected to be met by 7/4:  1. Patient will participate in ongoing swallow assessment.   2. When determined by SLP, patient may benefit from a repeat modified barium swallow study to further objectively assess swallow function/safety.        Patient to remain NPO. Repeat MBSS to be completed tomorrow, 7/3/14.   Emily P. Abadie M.S., CCC-SLP  Speech Language Pathologist  [phone]  07/02/2019

## 2019-07-02 NOTE — PT/OT/SLP PROGRESS
"Speech Language Pathology Treatment    Patient Name:  Juanita Ibarra   MRN:  [id number]  Admitting Diagnosis: S/P lung transplant    Recommendations:                 General Recommendations:  Dysphagia therapy and Modified barium swallow study  Diet recommendations:  Puree, Liquid Diet Level: NPO   Aspiration Precautions: Remain upright 30 minutes post meal and Small bites/sips   General Precautions: Standard, fall, contact  Communication strategies:  none    Subjective     Patient awake;alert. Seated upright in bedside chair. Complaints of nausea and pain.   "Well, I know now" - patient response to ST education to not drink thin liquids.     Pain/Comfort:  · Pain Rating 1: 0/10    Objective:     Has the patient been evaluated by SLP for swallowing?   Yes  Keep patient NPO? No   Current Respiratory Status: room air      Upon ST entry to room, patient with thin liquids (appearing to be consumed) at the bedside (water, milk) along with pureed breakfast food tray. Patient also coughing upon ST entry to room.  Patient was provided strict diet recommendations for puree only along with extensive review of MBSS in previous ST sessions for which she verbalized understanding too. ST attempted to complete further trials this service date, however patient refused 2/2 nausea. RN present administering zofran via IV at this time.  ST spoke with MD re: concerns for patient compliance with diet recommendations and benefits of repeat MBSS. MBSS orders placed by ST for 7/3/19 with verbal orders from transplant team.  Continue puree diet (no liquids) and meds crushed in puree.     Assessment:     Juanita Ibrara is a 29 y.o. female with an SLP diagnosis of Dysphagia.      Goals:   Multidisciplinary Problems     SLP Goals        Problem: SLP Goal    Goal Priority Disciplines Outcome   SLP Goal     SLP    Description:  Speech Language Pathology Goals  Goals expected to be met by 7/4:  1. Patient will participate in " ongoing swallow assessment.   2. When determined by SLP, patient may benefit from a repeat modified barium swallow study to further objectively assess swallow function/safety.                         Plan:     · Patient to be seen:  4 x/week   · Plan of Care expires:  07/27/19  · Plan of Care reviewed with:  patient   · SLP Follow-Up:  Yes       Discharge recommendations:  home with home health   Barriers to Discharge:  None    Time Tracking:     SLP Treatment Date:   07/02/19  Speech Start Time:  0845  Speech Stop Time:  0859     Speech Total Time (min):  14 min    Billable Minutes: [facility]/Home Management Training 14    Emily Abadie, CCC-SLP  07/02/2019

## 2019-07-02 NOTE — ASSESSMENT & PLAN NOTE
Received 6u PRBCs, 2u plts, 1 cryo, and 4 FFP angie-operatively. Will be conservative with future transfusions.

## 2019-07-02 NOTE — PLAN OF CARE
Problem: Adult Inpatient Plan of Care  Goal: Plan of Care Review  Recommendations     Recommendation/Intervention: 1.) Continue puree diet; SLP to recommend texture. 2.) If liquids allowed, suggest Boost Plus TID. 3.) Daily weights. 4.) If appetite remains poor, pt may benefit from nocturnal TF.   Goals: 1.) Diet to advance to include liquids by follow up. 2.) Pt to consume/tolerate >75% EEN and EPN by follow up.   Nutrition Goal Status: progressing towards goal  Communication of RD Recs: (POC)    Assessment and Plan  Nutrition Problem  Increased nutrient needs  Swallowing difficulty     Related to (etiology):   Physiological causes related to healing  Mechanical causes from surgery     Signs and Symptoms (as evidenced by):   S/p redo BLTx 6/18   Abnormal findings from barium swallow evaluation     Interventions/Recommendations (treatment strategy):  Collaboration and referral of nutrition care     Nutrition Diagnosis Status:   Continues   New     Nutrition Problem  Inadequate energy intake     Related to (etiology):   Decreased ability to consume sufficient energy     Signs and Symptoms (as evidenced by):   NPO with no alternative means of nutrition at this time     Interventions/Recommendations (treatment strategy):  Collaboration of nutrition care with other providers     Nutrition Diagnosis Status:   Resolved

## 2019-07-02 NOTE — ASSESSMENT & PLAN NOTE
POD#14 s/p bilateral lung transplant (re-transplant) for CARLOS EDUARDO. 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 PGD 0. S/p bronch and extubated to HFNC on POD#2, but was re-intubated 6/21 for hypercapnic respiratory failure. Right pigtail placed for PTX on 6/29. Discussed removing remaining chest tubes with CTS today.     Continue PT/OT/IS/CPT. Diet pureed (no fluids) with repeat MBSS tomorrow. Continue immunosuppression and prophylaxis. OR bronchoscopy tentatively scheduled for 7/5.

## 2019-07-02 NOTE — ANESTHESIA POST-OP PAIN MANAGEMENT
Acute Pain Service Progress Note    Juanita Ibarra is a 29 y.o., female, [id number].    Juanita Ibarra is a 29 y.o., female, [id number].     Surgery:  REDO BILATERAL LUNG TRANSPLANT     Post Op Day #: 13     Catheter type: None, bilateral ESPs removed 6/27/19    Problem List:    Active Hospital Problems    Diagnosis  POA    *S/P lung transplant [Z94.2]  Not Applicable    On enteral nutrition [Z78.9]  No    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 [A49.8, Z16.19]  No    Prophylactic immunotherapy [Z29.8]  Not Applicable    Acute blood loss anemia [D62]  No    Steroid-induced hyperglycemia [R73.9, T38.0X5A]  Yes    Prophylactic antibiotic [Z79.2]  Not Applicable    Immunosuppression [D89.9]  Yes    Hyperglycemia [R73.9]  Yes    Adrenal cortical steroids causing adverse effect in therapeutic use [T38.0X5A]  No    CF related Pancreatic insufficiency [K86.89]  Yes    Chronic pain with opiate use [G89.29]  Yes      Resolved Hospital Problems    Diagnosis Date Resolved POA    Acute hyperactive delirium due to another medical condition [F05] 07/01/2019 No    Awaiting organ transplant [Z76.82] 06/22/2019 Not Applicable    Awaiting organ transplant [Z76.82] 06/18/2019 Not Applicable    Thrombocytopenia, unspecified [D69.6] 07/01/2019 No    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 Pain slightly improved. Unable to crush ms contin.   Objective:        Vitals   Vitals:    07/02/19 0839   BP:    Pulse: (!) 136   Resp: 18   Temp:         Labs    Admission on 06/17/2019   No results displayed because visit has over 200 results.           Meds   Current Facility-Administered Medications   Medication Dose Route Frequency Provider Last Rate Last Dose    acetaminophen tablet 650 mg  650 mg Oral Q6H Chambers S.  Suman, NP   Stopped at 07/02/19 0000    acetylcysteine 200 mg/ml (20%) solution 4 mL  4 mL Nebulization Q12H [facility]   4 mL at 06/29/19 0846    bisacodyl suppository 10 mg  10 mg Rectal Daily PRN [facility]   10 mg at 06/28/19 0941    ciprofloxacin (CIPRO)400mg/200ml D5W IVPB 400 mg  400 mg Intravenous Q8H [place] LIANA Will,  mL/hr at 07/02/19 0544 400 mg at 07/02/19 0544    dextrose 10% (D10W) Bolus  12.5 g Intravenous PRN [place] LIANA Will, NP 1,000 mL/hr at 06/19/19 0232 125 mL at 06/19/19 0232    dextrose 10% (D10W) Bolus  25 g Intravenous PRN [place] LIANA Will, NP        dextrose 50% injection 12.5 g  12.5 g Intravenous PRN [place] LIANA Will, NP        dextrose 50% injection 25 g  25 g Intravenous PRN [facility]        diphenhydrAMINE injection 50 mg  50 mg Intravenous Q6H PRN Rickey Mitchell MD   50 mg at 07/02/19 0745    enoxaparin injection 40 mg  40 mg Subcutaneous Daily [facility]   40 mg at 07/01/19 1708    famotidine tablet 20 mg  20 mg Oral BID [facility]   20 mg at 07/02/19 0856    fluconazole tablet 400 mg  400 mg Oral Daily [facility] NP   400 mg at 07/02/19 0856    gabapentin capsule 300 mg  300 mg Oral TID [facility]   300 mg at 07/02/19 0856    ganciclovir (CYTOVENE) 295 mg in sodium chloride 0.9% 100 mL IVPB  5 mg/kg Intravenous Q12H [place] LIANA Will  mL/hr at 07/02/19 0039 295 mg at 07/02/19 0039    glucagon (human recombinant) injection 1 mg  1 mg Intramuscular PRN [place] LIANA Will, NP        glucose chewable tablet 16 g  16 g Oral PRN [place] LIANA Will, NP        glucose chewable tablet 24 g  24 g Oral PRN [place] LIANA Will, NP        HYDROmorphone injection 2 mg  2 mg Intravenous Q2H PRN Heather Awad MD   2 mg at 07/02/19 0848    insulin aspart U-100 pen 0-10 Units  0-10 Units Subcutaneous PRN Reyes Will NP   6 Units at 07/01/19 1708    insulin regular (Humulin R) 100 Units in sodium chloride 0.9% 100 mL  infusion  0.4 Units/hr Intravenous Continuous Reyes Will NP   Stopped at 07/01/19 2100    lactulose 20 gram/30 mL solution Soln 20 g  20 g Oral Q6H PRN Angelica Gomez MD        levalbuterol nebulizer solution 1.25 mg  1.25 mg Nebulization Q6H [place] Reyes Will NP   1.25 mg at 07/02/19 0824    lidocaine 5 % patch 1 patch  1 patch Transdermal Q24H [place] LIANA Will NP   Stopped at 06/29/19 1245    lidocaine 5 % patch 1 patch  1 patch Transdermal Q24H [place] LIANA Will NP   Stopped at 06/29/19 1245    lipase-protease-amylase (VIOKACE) 20,880-78,300- 78,300 units per tablet 1 tablet  1 tablet Oral Q3H PRN Reyes Will NP   1 tablet at 06/24/19 1700    lorazepam injection 2 mg  2 mg Intravenous Q4H PRN Reyes Will NP   2 mg at 07/02/19 1022    magnesium sulfate 2g in water 50mL IVPB (premix)  2 g Intravenous PRN Angelica Gomez MD   2 g at 07/02/19 1019    metoclopramide HCl injection 5 mg  5 mg Intravenous Q6H PRN Reyes Will NP   5 mg at 06/29/19 1400    metoprolol injection 5 mg  5 mg Intravenous Q6H PRN Reyes Will NP   5 mg at 07/01/19 0130    metoprolol tartrate (LOPRESSOR) tablet 25 mg  25 mg Oral TID Reyes Will NP   25 mg at 07/02/19 0856    morphine tablet 15 mg  15 mg Oral Q6H David Wang MD        mycophenolate (CELLCEPT) 500 mg in dextrose 5 % 100 mL IVPB  500 mg Intravenous BID Jake Alvarez MD 50 mL/hr at 07/02/19 0845 500 mg at 07/02/19 0845    naloxone 0.4 mg/mL injection 0.02 mg  0.02 mg Intravenous PRN Reyes Will NP        OLANZapine injection 10 mg  10 mg Intramuscular BID PRN Reyes Will NP   10 mg at 06/28/19 0339    ondansetron disintegrating tablet 8 mg  8 mg Oral Q8H PRN Reyes Will NP        ondansetron injection 8 mg  8 mg Intravenous Q6H PRN Chambers LIANA Will NP   8 mg at 07/02/19 0847    oxyCODONE immediate release tablet 5 mg  5 mg Oral Q4H PRN [place] LIANA Will NP        oxyCODONE immediate release tablet Tab  10 mg  10 mg Oral Q4H PRN Reyes Will NP        polyethylene glycol packet 17 g  17 g Per NG tube BID Reyes Will NP   Stopped at 06/29/19 0900    potassium chloride 20 mEq in 100 mL IVPB (FOR CENTRAL LINE ADMINISTRATION ONLY)  20 mEq Intravenous PRN Reyes Will NP 50 mL/hr at 06/22/19 0647 20 mEq at 06/22/19 0647    And    potassium chloride 40 mEq in 100 mL IVPB (FOR CENTRAL LINE ADMINISTRATION ONLY)  40 mEq Intravenous PRN Reyes Will NP 25 mL/hr at 06/18/19 1749 40 mEq at 06/18/19 1749    And    potassium chloride 20 mEq in 100 mL IVPB (FOR CENTRAL LINE ADMINISTRATION ONLY)  60 mEq Intravenous PRN Reyes Will NP        predniSONE tablet 20 mg  20 mg Oral Daily Chambers LIANA Will NP   20 mg at 07/02/19 0856    promethazine (PHENERGAN) 6.25 mg in dextrose 5 % 50 mL IVPB  6.25 mg Intravenous Q6H PRN Reyes Will  mL/hr at 07/01/19 2030 6.25 mg at 07/01/19 2030    QUEtiapine tablet 25 mg  25 mg Oral Daily Reyes Will NP   25 mg at 07/02/19 0856    QUEtiapine tablet 50 mg  50 mg Oral QHS Chambers LIANA Will NP   50 mg at 07/01/19 2047    sodium chloride 7% nebulizer solution 4 mL  4 mL Nebulization Q12H [place] LIANA Will NP   4 mL at 07/02/19 0839    vancomycin 750 mg in dextrose 5 % 250 mL IVPB (ready to mix system)  750 mg Intravenous Q12H [place] LIANA Will .7 mL/hr at 07/02/19 0800 750 mg at 07/02/19 0800      Anticoagulant dose: none.     Assessment:                 Pain control adequate     Plan:     Patient now s/p bilateral lung transplant on 06/18/19; Hx of significant opioid use post previous lung transplant approximately five years ago. Patient with significant opioid requirement prior to surgery from previous lung transplant. Remained intubated s/p lung transplant, requiring fentanyl with dexmedetomidine at 1.4mcg/kg/hr and propofol 50mcg/kg/min for sedation. S/P bilateral TARA catheter placement 06/19/19 w/ hand boluses Q12H at 0700 and 1900. Patient placed on ketamine  infusion on 6/20 at 10 mcg/kg/min (titrated up to 25 mcg/kg/min), extubated on 6/21 but re-intubated for respiratory distress, agitation, delirium. Ketamine discontinued. Restarted low dose non-titrating 5 mcg/kg/min on 6/24/19. Extubated 6/26/19 to HFNC. Bilateral TARA catheters pulled on 6/27/19. MBSS failed 6/28. Ketamine stopped 6/28/19. transitoned to crushed pills on 7/1/19     - Ketamine stopped 6/28/19  - Passed swallow with Speech and now approved for crushed/pureed. Transitioning pain regimen to PO as follows based on morphine equivalent calculations:  - ideally would go back to MS contin 15 mg bid however this is unable to be crushed. Morphine IR 15 mg q6  - Oxycodone 5mg for moderate pain q4 PRN; Oxycodone 10mg for severe pain q4 PRN   - Dilaudid 2 mg  IV PRN breakthrough pain  - Acetaminophen 650mg q6 scheduled  - Gabapentin 300mg TID    We will sign off at this time. Please call with any questions.  Defer treatment of anxiety to primary team       Evaluator David Cruz     I have seen the patient, reviewed the Resident's assessment and plan. I have personally interviewed and examined the patient at bedside and: agree with the findings.

## 2019-07-02 NOTE — PROGRESS NOTES
"[facility]  Endocrinology  Progress Note    Admit Date: 6/17/2019     Reason for Consult: Management of  Hyperglycemia and CF related pancreatic insufficiency.     Surgical Procedure and Date: lung transplant in 2014; 6/18/19    HPI:   Patient is a 29 y.o. female with a diagnosis of CF, HTN, migraine headache, and osteopenia. 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 No personal history of DM. Endocrinology consulted for BG management.     Lab Results   Component Value Date    HGBA1C 4.7 01/09/2019                 Interval HPI:   Overnight events:Transferred to TSU. BG at or below goal; insulin infusion suspended per orders. Prednisone 20 mg; steroids per primary.   Eating:   <25% unsweetened apple sauce - bites throughout the day   Nausea: No  Hypoglycemia and intervention: No  Fever: No  TPN and/or TF: No      BP (P) 126/87 (BP Location: Right arm, Patient Position: Lying)   Pulse (!) 136   Temp (P) 98.7 °F (37.1 °C) (Oral)   Resp 18   Ht 5' 1" (1.549 m)   Wt 65.7 kg (144 lb 13.5 oz)   LMP 10/02/2016   SpO2 99%   Breastfeeding? No   BMI 27.37 kg/m²      Labs Reviewed and Include    Recent Labs   Lab 07/02/19  0545      CALCIUM 8.5*   ALBUMIN 2.4*   PROT 5.4*   *   K 4.5   CO2 19*      BUN 21*   CREATININE 1.1   ALKPHOS 249*   ALT 99*   AST 28   BILITOT 0.3     Lab Results   Component Value Date    WBC 9.95 07/02/2019    HGB 7.9 (L) 07/02/2019    HCT 25.5 (L) 07/02/2019    MCV 90 07/02/2019     (L) 07/02/2019     No results for input(s): TSH, FREET4 in the last 168 hours.  Lab Results   Component Value Date    HGBA1C 4.7 01/09/2019       Nutritional status:   Body mass index is 27.37 kg/m².  Lab Results   Component Value Date    ALBUMIN 2.4 (L) 07/02/2019    ALBUMIN 2.5 (L) 07/01/2019    ALBUMIN 2.1 (L) 06/30/2019     Lab Results   Component Value Date    PREALBUMIN 15 (L) 05/29/2014    PREALBUMIN 11 (L) " 05/09/2014    PREALBUMIN 18 (L) 03/19/2014       Estimated Creatinine Clearance: 64.9 mL/min (based on SCr of 1.1 mg/dL).    Accu-Checks  Recent Labs     06/30/19  1219 06/30/19  1519 07/01/19  0006 07/01/19  0327 07/01/19  0803 07/01/19  1229 07/01/19  1704 07/01/19  2106 07/02/19  0048 07/02/19  0748   POCTGLUCOSE 275* 305* 182* 203* 191* 267* 315* 103 143* 172*       Current Medications and/or Treatments Impacting Glycemic Control  Immunotherapy:    Immunosuppressants         Stop Route Frequency     mycophenolate (CELLCEPT) 500 mg in dextrose 5 % 100 mL IVPB      -- IV 2 times daily        Steroids:   Hormones (From admission, onward)    Start     Stop Route Frequency Ordered    07/02/19 0900  predniSONE tablet 20 mg      -- Oral Daily 07/01/19 1148    06/26/19 1827  dexAMETHasone sodium phos (PF) 10 mg/mL injection     Note to Pharmacy:  Created by cabinet override    06/27 0629   06/26/19 1827        Pressors:    Autonomic Drugs (From admission, onward)    None        Hyperglycemia/Diabetes Medications:   Antihyperglycemics (From admission, onward)    Start     Stop Route Frequency Ordered    07/01/19 0830  insulin regular (Humulin R) 100 Units in sodium chloride 0.9% 100 mL infusion      -- IV Continuous 07/01/19 0724    06/28/19 1502  insulin aspart U-100 pen 0-10 Units      -- SubQ As needed (PRN) 06/28/19 1403          ASSESSMENT and PLAN    * S/P lung transplant  Managed per LUT.   avoid hypoglycemia        Hyperglycemia  BG goal 140 - 180.     Discontinue IV insulin.   Moderate dose correction scale  BG monitoring to AC/HS/0200 since diet advanced    Discharge planning: TBD    Adrenal cortical steroids causing adverse effect in therapeutic use  Glucocorticoids markedly increase blood glucose. Expect the steroid taper will help glucose control.         Prophylactic immunotherapy  May increase insulin resistance.       CF related Pancreatic insufficiency  May impact BG.           Amy Zapata,  NP  Endocrinology  [facility]

## 2019-07-03 NOTE — PLAN OF CARE
Problem: Adult Inpatient Plan of Care  Goal: Plan of Care Review  Fall education reviewed with pt. Instructed to call if assistance needed, verbalized understanding.   Cardiac monitoring in progress, currently ST.   Sats high 90's-100% on RA. Respiratory treatments continued. Right CT still in place, chest x-ray complete. Bronch scheduled for Friday.  MBSS complete today. Diet advanced to dental soft diet with nektar thick liquids.   BG checked AC/HS. Last , no SSI coverage needed.  Mg level 1.9, IV replacement given.   Reports of constant pain. Taking scheduled Morphine and requesting IV Dilaudid Q 4 hrs.  Requesting Ativan be brought in every 4 hrs.   Up in the chair now.   Afebrile. Cipro continued. Contact precautions maintained as ordered. Daily labs monitored.   Self med box prepared, will begin self med teaching tomorrow. Pt aware of plan.

## 2019-07-03 NOTE — H&P
Radiology History & Physical      SUBJECTIVE:     Chief Complaint: cystic fibrosis s/p BL lung transplant    History of Present Illness:  Juanita Ibarra is a 29 y.o. 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    Cystic fibrosis of the lung     Ear infection     Hypertension     Migraine headache     MRSA (methicillin resistant Staphylococcus aureus) carrier     Osteopenia     Other specified disease of pancreas     Pain disorder     Seizures 2013    Sinusitis, chronic     Vocal cord paralysis 06/2014    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       Past Surgical History:   Procedure Laterality Date    ABDOMINAL SURGERY      peg tube     [id number] N/A 5/19/2015    Performed by Deny Dias Jr., [facility] at [facility] OR    BIOPSY-BRONCHUS N/A 12/20/2016    Performed by Jake Alvarez MD at [facility] OR 2ND FLR    BRONCHOSCOPY Bilateral 12/11/2018    Performed by Francisca Morin DO at [facility] OR 2ND FLR    BRONCHOSCOPY N/A 10/1/2018    Performed by Jake Alvarez MD at [facility] OR 2ND FLR    BRONCHOSCOPY Bilateral 12/20/2016    Performed by Jake Alvarez MD at [facility] OR [facility]ND [facility]LR    BRONCHOSCOPY - flexible bronchoscopy with probable tissue biopsy CPT 28782 N/A 7/21/2015    Performed by Jake Alvarez MD at [facility] OR 2ND FLR    BRONCHOSCOPY - flexible bronchoscopy with probable tissue biospy CPT 65663 N/A 10/20/2015    Performed by Jake Alvarez MD at [facility] OR 2ND FLR    BRONCHOSCOPY - flexible bronchoscopy with tissue biopsy CPT 32675 N/A 9/29/2015    Performed by Jake Alvarez MD at [facility] OR 2ND FLR    BRONCHOSCOPY - flexible bronchoscopy with tissue biopsy CPT 65370 N/A 5/26/2015    Performed by Jake Alvarez MD at [facility] OR 1ST FLR    BRONCHOSCOPY flexible bronchoscopy with tissue biopsy N/A 9/8/2014    Performed by Jake Alvarez MD  at [facility] OR 2ND FLR    BRONCHOSCOPY flexible with possible tissue biopsy N/A 8/8/2014    Performed by Jake Alvarez MD at [facility] OR 2ND FLR    BRONCHOSCOPY, BAL, BIOPSIES N/A 3/20/2018    [facility]    BRONCHOSCOPY-FIBEROPTIC N/A 1/29/2016    Performed by Joann Cifuentes DO at [facility] OR 2ND FLR    BRONCHOSCOPY; Bronchoscopy with BAL and transbronchial biopsies under general anesthesia N/A 5/29/2018    [facility]    CHOLECYSTECTOMY  2016    CHOLECYSTECTOMY-LAPAROSCOPIC N/A 7/22/2016    [facility]    COLONOSCOPY N/A 5/3/2019    Performed by Juancho Rich MD at [facility] ENDO (2ND FLR)    ENDOMETRIAL ABLATION  2015    KJB    ETHMOIDECTOMY Bilateral 4/9/2019    Performed by [facility]in [facility]ks III[facility]    FESS      FESS Bilateral 10/21/2013    [facility]    FESS, USING COMPUTER-ASSISTED NAVIGATION N/A 4/9/2019    Performed by Adin Burks III, MD at [facility] OR [address] [place] [address]    FINE NEEDLE ASPIRATION (FNA)  of a cervical lymphadenopathy  1/29/2016    Performed by Joann [facility]uent[facility] at [facility]    flex bronchoscopy with probable tissue biopsy N/A 7/31/2014    Performed by [facility] Diagnostic Provider at [facility]    flexible bronchoscopy with tissue biopsy N/A 12/11/2014    Per[facility]    HYSTERECTOMY  04/2017    [facility]    INJECTION-VOCAL CORD Left 6/24/2014    Performed by Jared Whatley MD at [facility] OR [address] [place] [address]    [id number] to right chest and removal of portacath to left chests wall. Bilateral 6/24/2014    Performed by [facility]hen [facility]ado [facility]    LARYNX SURGERY  2016    LUNG TRANSPLANT Bilateral 6/12/14    MAXILLARY ANTROSTOMY  4/9/2019    Performed by [facility]in [facility]    MYRINGOTOMY W/ TUBES Right 04/2017     MYRINGOTOMY WITH INSERTION OF PE TUBES Right 4/15/2017    Performed by Gary Null MD at [facility] OR [facility]    PLACEMENT-TUBE-PEG  5/15/2014    [facility]    PORTACATH PLACEMENT Right     rt sc    REDO BILATERAL LUNG TRANSPLANT; CLAMSHELL Bilateral 6/18/2019    Performed by Jonathan Newby MD at [facility] OR [address] [place] [address]    REMOVAL-TUBE-PEG  5/15/2014    [facility]    RHC for lung re-transplant N/A 1/22/2019    Performed by Laura Rachel MD at [facility] CATH LAB    ROBOTIC ASSISTED LAPAROSCOPIC HYSTERECTOMY N/A 4/25/2017    Performed by Deny Dias Jr., MD at [facility] OR    SALPINGECTOMY Bilateral 2015    KJB    SALPINGECTOMY-LAPAROSCOPIC Bilateral 5/19/2015    Performed by Deny Dias Jr., MD at [facility] OR    SINUS SURGERY FUNCTIONAL ENDOSCOPIC WITH NAVIGATION Bilateral 4/3/2017    Performed by Cesar Olsen MD at [facility] OR [address] [place] [address]    SINUS SURGERY FUNCTIONAL ENDOSCOPIC WITH NAVIGATION, 29218, 98788, 37387, 07325 Bilateral 2/5/2015    Performed by Cesar Olsen MD at [facility] OR [address] [place] [address]    SPHENOIDECTOMY Bilateral 4/9/2019    Performed by [place] GUSTABO Burks III, MD at [facility] OR [address] [place] [address]    THYROPLASTY - Medialization laryngoplasty, cricothyroid subluxation, arytenoid repositioning Left 8/2/2016    [facility]    TRANSPLANT-LUNG Bilateral 6/11/2014    Performed by Adolfo Huston MD at [facility] OR [address] [place] [address]       Home Meds:   Prior to Admission medications    Medication Sig Start Date End Date Taking? Authorizing Provider   amLODIPine (NORVASC) 10 MG tablet Take 1 tablet (10 mg total) by mouth once daily. 11/15/18 11/15/19  Jake Alvarez MD   butalbital-acetaminophen-caffeine -40 mg (FIORICET, ESGIC) -40 mg per tablet Take 1 tablet by mouth every 6 (six) hours as needed for Pain. 6/12/19   Jake Alvarez MD   calcium-vitamin D3 (OS-KATY 500 + D3) 500 mg(1,250mg)  -200 unit per tablet Take 1 tablet by mouth 2 (two) times daily with meals. 11/26/18   Francisca Morin DO   dapsone 100 MG Tab Take 1 tablet (100 mg total) by mouth once daily. 7/28/18   Jake Alvarez MD   diphenhydrAMINE (BENADRYL) 50 MG tablet Take 1 tablet (50 mg total) by mouth every 6 (six) hours as needed for Itching. 8/19/14   Jake Alvarez MD   fexofenadine (ALLEGRA) 180 MG tablet Take 180 mg by mouth once daily.    Historical Provider, MD   fluconazole (DIFLUCAN) 150 MG Tab TAKE 1 TABLET BY MOUTH EVERY WEEK 8/13/18   Jake Alvarez MD   fluticasone propionate (FLONASE) 50 mcg/actuation nasal spray INSERT 2 SPRAYS IN EACH NOSTRIL DAILY 11/26/18   Francisca Morin DO   folic acid (FOLVITE) 1 MG tablet Take 1 tablet (1,000 mcg total) by mouth once daily. 7/28/18   Jake Alvarez MD   gabapentin (NEURONTIN) 300 MG capsule Take 1 capsule (300 mg total) by mouth 3 (three) times daily. 4/18/19   Jake Alvarez MD   inhalation spacing device (PROCHAMBER) USE AS DIRECTED 4/2/19   Jake Alvarez MD   levalbuterol (XOPENEX HFA) 45 mcg/actuation inhaler Inhale 1-2 puffs into the lungs. 11/28/18 11/28/19  Historical Provider, MD   levalbuterol (XOPENEX) 1.25 mg/3 mL nebulizer solution Take 3 mLs (1.25 mg total) by nebulization every 8 (eight) hours as needed for Wheezing or Shortness of Breath. Rescue 12/12/18 12/12/19  Francisca Morin DO   lipase-protease-amylase (CREON) 36,000-114,000- 180,000 unit CpDR Take 4 capsules by mouth 3 (three) times daily with meals. Take 3 with snacks as needed.  Patient taking differently: Take 5 capsules by mouth 3 (three) times daily with meals.  5/8/19   Jake Alvarez MD   LORazepam (ATIVAN) 2 MG Tab Take 1 tablet by mouth every 8 hours as needed for ANXIETY 5/14/19   Julio Berry MD   magnesium oxide (MAG-OX) 400 mg (241.3 mg magnesium) tablet Take 1 tablet (400 mg total) by mouth 2 (two) times daily. 1/29/19   Francisca BOWDEN  DO Mikel   metoprolol tartrate (LOPRESSOR) 25 MG tablet Take 1 tablet (25 mg total) by mouth 2 (two) times daily. 12/21/18 12/21/19  Jake Alvarez MD   mometasone-formoterol (DULERA) 100-5 mcg/actuation HFAA Inhale 1 puff into the lungs 2 (two) times daily. Controller 5/30/19   Francisca Morin DO   montelukast (SINGULAIR) 10 mg tablet Take 1 tablet (10 mg total) by mouth once daily. 7/28/18   Jake Alvarez MD   morphine (MS CONTIN) 15 MG 12 hr tablet Take 1 tablet by mouth twice a day 6/16/19      multivit,min52-folic-vitK-cQ10 (AQUADEKS) 100-700-10 mcg-mcg-mg Cap cap 1 tab twice daily 6/5/18   Jake Alvarez MD   multivitamin-minerals no.55 (CENTRUM FLAVOR BURST ADULT) Chew  2/14/18   Historical Provider, MD   mycophenolate (CELLCEPT) 250 mg Cap Take 2 capsules (500 mg total) by mouth 2 (two) times daily. 12/21/18   Jake Alvarez MD   omeprazole (PRILOSEC) 40 MG capsule Take 40 mg by mouth every morning.    Historical Provider, MD   ondansetron (ZOFRAN) 8 MG tablet Take 1 tablet (8 mg total) by mouth every 12 (twelve) hours as needed for Nausea. 5/3/19   Francisca Morin DO   oxycodone (ROXICODONE) 5 MG immediate release tablet Take 10 mg by mouth every 4 (four) hours as needed for Pain.    Historical Provider, MD   oxyCODONE (ROXICODONE) 5 MG immediate release tablet take 1 tablet by mouth twice a day 6/16/19      polyethylene glycol (GLYCOLAX) 17 gram PwPk Take 17 g by mouth 2 (two) times daily. 12/22/16   Jake Alvarez MD   predniSONE (DELTASONE) 5 MG tablet Take 1 tablet (5 mg total) by mouth once daily. 7/28/18   Jake Alvarez MD   promethazine (PHENERGAN) 25 MG tablet Take 1 tablet (25 mg total) by mouth every 6 (six) hours as needed for Nausea. 4/2/19   Jake Alvarez MD   QUEtiapine (SEROQUEL) 25 MG Tab Take 1 tablet by mouth in morning and 2 tablets at bedtime 5/14/19   Julio Berry MD   sodium chloride 3% 3 % nebulizer solution Take 4 mLs by  nebulization once daily. 6/10/19   Jake Alvarez MD   tacrolimus (PROGRAF) 0.5 MG Cap Take 1 capsule (0.5 mg total) by mouth every 12 (twelve) hours. 3/1/19   Francisca Morin DO   tacrolimus (PROGRAF) 1 MG Cap Take 2 capsules (2 mg total) by mouth every 12 (twelve) hours. Daily doses: 2.5 mg twice daily. 11/21/18   Francisca Morin DO   tiZANidine (ZANAFLEX) 4 MG tablet TAKE 2 TABLETS BY MOUTH EVERY 6 HOURS AS NEEDED 2/21/19   Jake Alvarez MD   tobramycin with nebulizer 300 mg/5 mL Nebu Take 300 mg by nebulization every 12 (twelve) hours. 6/7/19   Jake Alvarez MD   topiramate (TOPAMAX) 25 MG tablet Take 1 tablet (25 mg total) by mouth 2 (two) times daily 5/3/19 5/2/20  Francisca Morin DO   venlafaxine (EFFEXOR-XR) 150 MG Cp24 Take 1 capsule (150 mg total) by mouth once daily. 5/14/19   Julio Berry MD   venlafaxine (EFFEXOR-XR) 37.5 MG 24 hr capsule Take 1 capsule (37.5 mg total) by mouth once daily. 5/14/19   Julio Berry MD       Anticoagulants/Antiplatelets: no anticoagulation    Allergies:   Review of patient's allergies indicates:   Allergen Reactions    Albuterol Palpitations    Colistin Anaphylaxis    Vancomycin analogues      Infusion reaction that does not resolve with slowing  Pt. States she can tolerate it when given with 50 mg Benadryl and ran over 3 hours    Neupogen [filgrastim] Other (See Comments)     Ostealgia after five daily doses of 300 mcg.      Bactrim [sulfamethoxazole-trimethoprim] Hives    Ceftazidime Hives     Pt stated can tolerate cefapine not ceftazidime    Ceftazidime     Dronabinol Other (See Comments)     Mental changes/hallucinations    Haldol [haloperidol lactate] Other (See Comments)     Seizure like activity    Nsaids (non-steroidal anti-inflammatory drug)      Cannot have due to lung transplant    Adhesive Rash     Cloth tape- please use tegaderm or paper tape    Aztreonam Rash    Ciprofloxacin Nausea And Vomiting      Projectile N/V, per patient.  Unwilling to retry therapy.       Sedation History:  no adverse reactions    Review of Systems:   As documented in primary provider H&P.      OBJECTIVE:     Vital Signs (Most Recent)  Temp: 98 °F (36.7 °C) (07/03/19 0727)  Pulse: 101 (07/03/19 0727)  Resp: 18 (07/03/19 0727)  BP: 111/71 (07/03/19 0727)  SpO2: 96 % (07/03/19 0727)    Physical Exam:  ASA: 3  Mallampati: 1      Laboratory  Lab Results   Component Value Date    INR 0.9 06/20/2019       Lab Results   Component Value Date    WBC 7.48 07/03/2019    HGB 7.5 (L) 07/03/2019    HCT 24.3 (L) 07/03/2019    MCV 89 07/03/2019     (L) 07/03/2019      Lab Results   Component Value Date     (H) 07/03/2019     07/03/2019    K 4.6 07/03/2019     07/03/2019    CO2 19 (L) 07/03/2019    BUN 17 07/03/2019    CREATININE 1.0 07/03/2019    CALCIUM 8.0 (L) 07/03/2019    MG 1.9 07/03/2019    ALT 70 (H) 07/03/2019    AST 20 07/03/2019    ALBUMIN 2.2 (L) 07/03/2019    BILITOT 0.3 07/03/2019    BILIDIR 0.1 11/07/2018       ASSESSMENT/PLAN:     Sedation Plan: moderate  Patient will undergo left tunneled picc placement    Tim Friedman MD  Department of Radiology  Pager: [phone]

## 2019-07-03 NOTE — PLAN OF CARE
Plan of care reviewed with patient, patient verbalizes understanding. Pt arrived to  for left tunneled picc placement . Pt oriented to unit and staff. Comfort measures utilized. Pt safely transferred from stretcher to procedural table. Fall risk reviewed with patient, fall risk interventions maintained. Safety strap applied, positioner pillows utilized to minimize pressure points. Blankets applied. Pt prepped and draped utilizing standard sterile technique. Patient placed on continuous monitoring, as required by sedation policy. Timeouts completed utilizing standard universal time-out, per department and facility policy. RN to remain at bedside, continuous monitoring maintained. Pt resting comfortably. Denies pain/discomfort. Will continue to monitor. See flow sheets for monitoring, medication administration, and updates.

## 2019-07-03 NOTE — PT/OT/SLP PROGRESS
Occupational Therapy      Patient Name:  Juanita Ibarra   MRN:  [id number]    Patient not seen today secondary to PICC line placement and MBSS in the AM. Consulted with RN at 10:41; RN reported that pt has been off of the floor since 8am. RN agreed to hold on treatment for today. Will follow-up on next scheduled visit per OT POC.    Ann Hood, OT  7/3/2019

## 2019-07-03 NOTE — PLAN OF CARE
Problem: SLP Goal  Goal: SLP Goal  Speech Language Pathology Goals  Goals expected to be met by 7/4:  1. Patient will participate in ongoing swallow assessment.   2. When determined by SLP, patient may benefit from a repeat modified barium swallow study to further objectively assess swallow function/safety.        MBSS completed. ST recommending nectar thickened liquids and dental soft consistencies. Full note to follow.   Emily P. Abadie M.S., CCC-SLP  Speech Language Pathologist  [phone]  07/03/2019

## 2019-07-03 NOTE — PLAN OF CARE
Problem: Adult Inpatient Plan of Care  Goal: Plan of Care Review  Outcome: Ongoing (interventions implemented as appropriate)  -Pt AAOx4  -Vital signs stable; pt remains on RA  -Telemetry monitoring ST ; HR ranging in the low 100s to 130s  -BG monitoring ACHS and 0200  -Incision under breast KENNETH with dermabind  -CT to Wall wall suction, see flowsheet for output values  -PRN ativan, dilaudid, zofran given  -Pt NPO at midnight for MBSS  -Plan for bronch on Friday  -Contact precautions maintained  - at bedside  -Fall precautions maintained, non skid socks worn  -No acute events/falls/injuries this shift  -Pt aware to call for help with ambulating.    -Bed lowered, locked, siderails up x2, and call bell in reach.     See flowsheet for assessment findings.  Will continue to monitor pt.

## 2019-07-03 NOTE — PROCEDURES
Modified Barium Swallow    Patient Name:  Juanita Ibarra   MRN:  [id number]      Recommendations:     Recommendations:                General Recommendations:  Dysphagia therapy  Diet recommendations:  Dental Soft, [place] Thick   Aspiration Precautions:   · 1 bite/sip at a time  · Assistance with thickening liquids  · Feed only when awake/alert  · HOB to 90 degrees  · Meds whole 1 at a time  · No straws  · Remain upright 30 minutes post meal  · Small bites/sips   General Precautions: Standard, fall  Communication strategies:  none    Referral     Reason for Referral  Patient was referred for a Modified Barium Swallow Study to assess the efficiency of his/her swallow function, rule out aspiration and make recommendations regarding safe dietary consistencies, effective compensatory strategies, and safe eating environment.     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       Objective:     Current Respiratory Status: 07/03/19    Alert: yes    Cooperative: yes    Follows Directions: yes    Visualization  · Patient was seen in the lateral view  · Supplement O2 in place via NC    Oral Peripheral Examination  · Oral Musculature: general weakness  · Dentition: present and adequate  · Mucosal Quality: dry  · Volitional Cough: Productive  · Voice Prior to PO Intake: Fleeting functional phonation achieved during study, intermittent aphonia.    Consistencies Assessed  · Thin via tsp with head turn to the left.   · Nectar thick via tsp and cup with and without chin  tuck  · Thin-Honey Thick via cup sip  · Puree via full tsp  · Solids via 1/4 cracker with barium coating    Oral Preparation/Oral Phase  · WFL- Pt with adequate bolus acceptance, containment, control and timely A-P transfer across consistencies     Pharyngeal Phase   Essentially timely swallow initiation for age.   Reduced BOT retraction  Reduced laryngeal elevation, laryngeal excursion, and epiglottic inversion.   Naina aspiration of thin liquid trial with head turn noted on the anterior tracheal wall before the swallow.   Flash penetration of nectar thickened liquids with chin tuck swallow strategy.   No penetration or aspiration noted with regular cup sips of nectar thickened liquids, honey thickened liquids, puree, or regular solids.   Mild Pyriform sinus residue noted with all consistencies.   Pt without sensory response/airway protective response during aspiration of thin liquids.   Strategies (head turn to the left) attempted were or were not effective in eliminating or reducing the occurrence of aspiration.    Cervical Esophageal Phase  · Retrograde flow noted in upper esophagus    Assessment:     Impressions  ·   Patient demonstrates mild Oropharyngeal  dysphagia characterized by naina aspiraiton of thin liquids despite strategies. .     Prognosis: Good    Barriers:  · Fatigue  · Respiratory compromise  · pain management    Plan  ST to follow up at the bedside for dysphagia therapy, and ongoing swallow assessment/education.   ST recommending a dental soft consistency diet and nectar thickened liquids.     Education  Results were discussed with patient. Results were discussed with Medical Team who was in agreement with plan.     Goals:   Multidisciplinary Problems     SLP Goals        Problem: SLP Goal    Goal Priority Disciplines Outcome   SLP Goal     SLP    Description:  Speech Language Pathology Goals  Goals expected to be met by 7/4:  1. Patient will participate in ongoing swallow assessment.   2. When  determined by SLP, patient may benefit from a repeat modified barium swallow study to further objectively assess swallow function/safety.                         Plan:   · Patient to be seen:  Therapy Frequency: 4 x/week   · Plan of Care expires:  07/27/19  · Plan of Care reviewed with:  patient        Discharge recommendations:  home health speech therapy   Barriers to Discharge:  None    Time Tracking:   SLP Treatment Date:   07/03/19  Speech Start Time:  1030  Speech Stop Time:  1050     Speech Total Time (min):  20 min    Emily Abadie, CCC-SLP  07/03/2019

## 2019-07-03 NOTE — PT/OT/SLP PROGRESS
Physical Therapy      Patient Name:  Juanita Ibarra   MRN:  [id number]    Patient not seen today secondary to pt away at procedure/testing on first attempt and second attempt pt not feeling up to Therapy after just returning from previously scheduled treatments. Will follow-up next scheduled treatment session.    BURKE Anthony  I certify that I was present in the room directing the student in service delivery and guiding them using my skilled judgment. As the co-signing therapist I have reviewed the students documentation and am responsible for the treatment, assessment, and plan. Pradip Villeda PTA  7/3/2019

## 2019-07-03 NOTE — CARE UPDATE
BG goal 140-180. Prandial excursions noted yesterday; BG at goal overnight with correction scale. Po intake minimal      BG monitoring ac/hs/0200 and moderate dose correction scale.       ** Please call Endocrine for any BG related issues **

## 2019-07-03 NOTE — ASSESSMENT & PLAN NOTE
BG goal 140 - 180.     Moderate dose correction scale  BG monitoring to AC/HS    Discharge planning: TBD

## 2019-07-03 NOTE — PROCEDURES
Radiology Post-Procedure Note    Pre Op Diagnosis: cystic fibrosis  Post Op Diagnosis: Same    Procedure: left IJ tunneled PICC placement    Procedure performed by: Philip Hebert MD, Tim Friedman MD (res)    Written Informed Consent Obtained: Yes  Specimen Removed: NO  Estimated Blood Loss: Minimal    Findings:   Left IJ access obtained with 21G micropuncture kit. 5F tunneled PICC placed in left chest with tip in right atrium. See imaging report for details.    Patient tolerated procedure well.    Tim Friedman MD  Department of Radiology  Pager: [phone]

## 2019-07-03 NOTE — PROGRESS NOTES
Therapy with vancomycin complete and/or consult discontinued by provider.  Pharmacy will sign off, please re-consult as needed.

## 2019-07-03 NOTE — PROGRESS NOTES
Pt back from tunneled PICC placement and MBSS. AAO. VSS. Informed FELICIA Laguerre of pt's return, says ok to resume Pureed diet.

## 2019-07-03 NOTE — SUBJECTIVE & OBJECTIVE
Subjective:     Interval History: No acute events overnight. Pain well controlled.     Continuous Infusions:    Scheduled Meds:   acetaminophen  650 mg Oral Q6H    acetylcysteine 200 mg/ml (20%)  4 mL Nebulization Q12H    ciprofloxacin  400 mg Intravenous Q8H    enoxaparin  40 mg Subcutaneous Daily    famotidine  20 mg Oral BID    fluconazole  400 mg Oral Daily    gabapentin  300 mg Oral TID    ganciclovir (CYTOVENE) IVPB  5 mg/kg Intravenous Q12H    levalbuterol  1.25 mg Nebulization Q6H WAKE    lidocaine  1 patch Transdermal Q24H    lidocaine  1 patch Transdermal Q24H    lipase-protease-amylase 24,000-76,000-120,000 units  4 capsule Oral TID WM    metoprolol tartrate  25 mg Oral TID    morphine  15 mg Oral Q6H    mycophenolate (CELLCEPT) IVPB  500 mg Intravenous BID    polyethylene glycol  17 g Per NG tube BID    predniSONE  20 mg Oral Daily    QUEtiapine  25 mg Oral Daily    QUEtiapine  50 mg Oral QHS    sodium chloride 7%  4 mL Nebulization Q12H    tacrolimus  0.5 mg Sublingual BID     PRN Meds:bisacodyl, Dextrose 10% Bolus, Dextrose 10% Bolus, diphenhydrAMINE, glucagon (human recombinant), glucose, glucose, HYDROmorphone, insulin aspart U-100, lactulose, lipase-protease-amylase 24,000-76,000-120,000 units, lorazepam, magnesium sulfate IVPB, metoclopramide HCl, metoprolol, naloxone, OLANZapine, ondansetron, ondansetron, potassium chloride in water **AND** potassium chloride in water **AND** potassium chloride in water, promethazine (PHENERGAN) IVPB    Review of patient's allergies indicates:   Allergen Reactions    Albuterol Palpitations    Colistin Anaphylaxis    Vancomycin analogues      Infusion reaction that does not resolve with slowing  Pt. States she can tolerate it when given with 50 mg Benadryl and ran over 3 hours    Neupogen [filgrastim] Other (See Comments)     Ostealgia after five daily doses of 300 mcg.      Bactrim [sulfamethoxazole-trimethoprim] Hives    Ceftazidime  Hives     Pt stated can tolerate cefapine not ceftazidime    Ceftazidime     Dronabinol Other (See Comments)     Mental changes/hallucinations    Haldol [haloperidol lactate] Other (See Comments)     Seizure like activity    Nsaids (non-steroidal anti-inflammatory drug)      Cannot have due to lung transplant    Adhesive Rash     Cloth tape- please use tegaderm or paper tape    Aztreonam Rash    Ciprofloxacin Nausea And Vomiting     Projectile N/V, per patient.  Unwilling to retry therapy.       Review of Systems   Constitutional: Negative for appetite change, chills, diaphoresis and fever.   HENT: Negative for congestion, postnasal drip, rhinorrhea, sinus pressure and sinus pain.    Respiratory: Positive for cough and shortness of breath. Negative for wheezing.    Cardiovascular: Positive for chest pain (insertion site). Negative for palpitations and leg swelling.   Gastrointestinal: Negative for abdominal distention, diarrhea, nausea and vomiting.   Genitourinary: Negative for decreased urine volume, difficulty urinating, dysuria and urgency.   Musculoskeletal: Positive for back pain (chronic).   Skin: Negative for rash and wound.   Allergic/Immunologic: Positive for immunocompromised state.   Neurological: Positive for weakness. Negative for dizziness, syncope and headaches.   Psychiatric/Behavioral: Negative for confusion. The patient is nervous/anxious.      Objective:   Physical Exam   Constitutional: She appears well-developed and well-nourished. She is easily aroused. No distress.   HENT:   Head: Normocephalic and atraumatic.   Nose: Nose normal.   Eyes: Conjunctivae and EOM are normal. No scleral icterus.   Neck: No JVD present.   Right IJ CVC, CDI   Cardiovascular: Regular rhythm. Tachycardia present. Exam reveals no friction rub.   No murmur heard.  Pulmonary/Chest: No accessory muscle usage. No respiratory distress. She has decreased breath sounds in the right lower field and the left lower field.  She has no wheezes. She has rhonchi in the right upper field and the left upper field. She has no rales.   Bilateral chest tubes to suction, no air leak   Abdominal: Soft. Bowel sounds are normal. She exhibits no distension. There is no tenderness.   Musculoskeletal: She exhibits no edema.   Neurological: She is alert and easily aroused. No cranial nerve deficit.   Skin: Skin is warm and dry. No rash noted. She is not diaphoretic. No erythema.   Psychiatric: Her mood appears anxious.   Nursing note and vitals reviewed.        Vital Signs (Most Recent):  Temp: 98.6 °F (37 °C) (07/03/19 1145)  Pulse: (!) 115 (07/03/19 1145)  Resp: 18 (07/03/19 1145)  BP: 118/82 (07/03/19 1145)  SpO2: 98 % (07/03/19 1145) Vital Signs (24h Range):  Temp:  [97.7 °F (36.5 °C)-99.4 °F (37.4 °C)] 98.6 °F (37 °C)  Pulse:  [] 115  Resp:  [12-20] 18  SpO2:  [92 %-100 %] 98 %  BP: (108-133)/(68-84) 118/82     Weight: 65.7 kg (144 lb 13.5 oz)  Body mass index is 27.37 kg/m².      Intake/Output Summary (Last 24 hours) at 7/3/2019 1329  Last data filed at 7/3/2019 1215  Gross per 24 hour   Intake 690 ml   Output 540 ml   Net 150 ml       Ventilator Data:          Hemodynamic Parameters:       Lines/Drains:       Introducer right internal jugular (Active)   Specific Qualities Capped 6/30/2019  7:10 AM   Dressing Status Clean;Dry;Biopatch in place;Intact 6/30/2019  7:10 AM   Dressing Intervention Dressing reinforced 6/29/2019  3:00 AM   Dressing Change Due 06/29/19 6/30/2019  3:00 AM   Daily Line Review Performed 6/30/2019  7:10 AM   Number of days:             Port A Cath Single Lumen 06/24/14 1200 right subclavian (Active)   Accessed by: Radha Lanza 6/24/2019  5:00 AM   Dressing Type Transparent 6/30/2019  7:10 AM   Dressing Status Biopatch in place;Clean;Dry;Intact 6/30/2019  7:10 AM   Dressing Intervention Dressing reinforced 6/29/2019  3:00 AM   Dressing Change Due 06/30/19 6/30/2019  3:00 AM   Line Status Infusing 6/30/2019  7:10 AM    Flush Performed Yes 6/30/2019  7:10 AM   Date to be Reflushed 06/18/19 6/29/2019  3:00 AM   Daily Line Review Performed 6/30/2019  7:10 AM   Type of Needle Nicole 6/30/2019  3:00 AM   Gauge 20 6/30/2019  3:00 AM   Needle Length 1 in 6/30/2019  3:00 AM   Needle Status Left in place 6/30/2019  3:00 AM   Needle Insertion Date 06/24/19 6/30/2019  3:00 AM   Needle Insertion Time 0500 6/26/2019  3:00 AM   Number of days: 1831            Percutaneous Central Line Insertion/Assessment - double lumen  06/18/19 0510 (Active)   Dressing biopatch in place;dressing dry and intact 6/30/2019  7:10 AM   Securement secured w/ sutures 6/30/2019  7:10 AM   Additional Site Signs no erythema;no warmth 6/30/2019  7:10 AM   Distal Patency/Care infusing 6/30/2019  7:10 AM   Proximal Patency/Care infusing 6/30/2019  7:10 AM   Waveform normal 6/30/2019  7:10 AM   Line Interventions line leveled/zeroed 6/30/2019  7:10 AM   Dressing Change Due 06/29/19 6/30/2019  3:00 AM   Daily Line Review Performed 6/30/2019  7:10 AM   Number of days: 12            Peripheral IV - Single Lumen 06/27/19 0917 22 G Right Forearm (Active)   Site Assessment Clean;Dry;Intact;No redness;No swelling 6/30/2019  7:10 AM   Line Status Infusing 6/30/2019  7:10 AM   Dressing Status Clean;Dry;Intact 6/30/2019  7:10 AM   Dressing Intervention Dressing reinforced 6/29/2019  3:00 AM   Dressing Change Due 07/01/19 6/30/2019  3:00 AM   Site Change Due 07/01/19 6/29/2019  7:00 PM   Reason Not Rotated Not due 6/30/2019  7:10 AM   Number of days: 3            Chest Tube 06/29/19 1655 1 Right Fourth intercostal space;Midaxillary 7 Fr. (Active)   Chest Tube WDL WDL 6/30/2019  7:10 AM   Function -20 cm H2O 6/30/2019  7:10 AM   Air Leak/Fluctuation air leak present 6/30/2019  7:10 AM   Safety all tubing connections taped;2 rubber-tipped hemostats w/ patient;all connections secured;suction checked 6/30/2019  7:10 AM   Securement tubing secured to body distal to insertion site w/ tape  6/30/2019  7:10 AM   Patency Intervention Tip/tilt 6/30/2019  7:10 AM   Drainage Description Serosanguineous 6/30/2019  7:10 AM   Dressing Appearance occlusive gauze dressing intact 6/30/2019  7:10 AM   Dressing Care dressing changed 6/30/2019 10:00 AM   Left Subcutaneous Emphysema none present 6/30/2019  7:10 AM   Right Subcutaneous Emphysema none present 6/30/2019  7:10 AM   Site Assessment Clean;Dry;Intact;No redness;No swelling;Ecchymotic 6/30/2019 10:00 AM   Surrounding Skin Dry;Intact 6/30/2019  7:10 AM   Output (mL) 0 mL 6/30/2019 10:00 AM   Number of days: 0            Y Chest Tube 3 and 4 06/18/19 1151 3 Left Pleural 19 Fr. 4 Left Mediastinal 19 Fr. (Active)   Function -20 cm H2O 6/30/2019  7:10 AM   Air Leak/Fluctuation air leak not present 6/30/2019  7:10 AM   Safety all tubing connections taped;2 rubber-tipped hemostats w/ patient;all connections secured;suction checked 6/30/2019  7:10 AM   Securement tubing secured to body distal to insertion site w/ tape 6/30/2019  7:10 AM   Left Subcutaneous Emphysema none present 6/30/2019  7:10 AM   Right Subcutaneous Emphysema none present 6/30/2019  7:10 AM   Patency Intervention Tip/tilt 6/30/2019  7:10 AM   Drainage Description 3 Serosanguineous 6/30/2019  7:10 AM   Tube 3 Dressing Appearance occlusive gauze dressing intact 6/30/2019  7:10 AM   Tube 3 Dressing Care dressing changed 6/30/2019 10:00 AM   Site Assessment 3 Clean;Dry;Intact 6/30/2019 10:00 AM   Surrounding Skin 3 Dry;Intact 6/30/2019  7:10 AM   Drainage Description 4 Serosanguineous 6/30/2019  7:10 AM   Tube 4 Dressing Appearance occlusive gauze dressing intact 6/30/2019  7:10 AM   Tube 4 Dressing Care dressing changed 6/30/2019 10:00 AM   Site Assessment 4 Clean;Dry;Intact 6/30/2019 10:00 AM   Surrounding Skin 4 Dry;Intact 6/30/2019  7:10 AM   Output (mL) 0 mL 6/30/2019 10:00 AM   Number of days: 11       Significant Labs:  CBC:  Recent Labs   Lab 07/03/19  0530   WBC 7.48   RBC 2.72*   HGB 7.5*    HCT 24.3*   *   MCV 89   MCH 27.6   MCHC 30.9*     BMP:  Recent Labs   Lab 07/03/19  0530      K 4.6      CO2 19*   BUN 17   CREATININE 1.0   CALCIUM 8.0*      Tacrolimus Levels:  Recent Labs   Lab 07/03/19  0530   TACROLIMUS 8.5     Microbiology:  Microbiology Results (last 7 days)     Procedure Component Value Units Date/Time    Fungus culture [[id number]] Collected:  06/20/19 0808    Order Status:  Completed Specimen:  Respiratory from Bronchial Wash Updated:  07/03/19 1000     Fungus (Mycology) Culture Culture in progress      No fungus isolated after 2 weeks    Fungus culture [[id number]]  (Abnormal) Collected:  06/18/19 0930    Order Status:  Completed Specimen:  Tissue from Lung, Left Updated:  06/27/19 1055     Fungus (Mycology) Culture CANDIDA ALBICANS  Moderate      Narrative:       Donor lung    Fungus culture [[id number]]  (Abnormal) Collected:  06/23/19 1735    Order Status:  Completed Specimen:  Respiratory from Bronchial Wash Updated:  06/27/19 1048     Fungus (Mycology) Culture BRANDI GLABRATA  Few      Culture, Respiratory [[id number]]  (Susceptibility) Collected:  06/25/19 0900    Order Status:  Completed Specimen:  Respiratory from Bronchial Wash Updated:  06/27/19 0905     Respiratory Culture No S aureus isolated.      PSEUDOMONAS AERUGINOSA   Few       Gram Stain (Respiratory) <10 epithelial cells per low power field.     Gram Stain (Respiratory) Many WBC's     Gram Stain (Respiratory) No organisms seen    Narrative:       Bronchial Wash    AFB Culture & Smear [[id number]] Collected:  06/25/19 0900    Order Status:  Completed Specimen:  Respiratory from Bronchial Wash Updated:  06/26/19 2127     AFB Culture & Smear Culture in progress     AFB CULTURE STAIN No acid fast bacilli seen.    Narrative:       Bronchial Wash          I have reviewed all pertinent labs within the past 24 hours.    Diagnostic Results:  Chest X-Ray: Vascular catheters, right pleural drain and monitoring  leads remain.  There is some high-density material in the upper abdomen likely from the modified barium swallow.  Heart is enlarged there is mild increase in the pulmonary vascular markings.  Persistent parenchymal opacification bilaterally.  No significant pneumothorax seen.  Probable right pleural effusion.

## 2019-07-03 NOTE — SUBJECTIVE & OBJECTIVE
"Interval HPI:   Overnight events: Remains in TSU. NAEON.  BG at or slightly above goal with correction scale coverage. Prednisone 20 mg daily; steroids per primary.   Eating:   <25%; apple sauce bites throughout the day   Nausea: No  Hypoglycemia and intervention: No  Fever: No  TPN and/or TF: No    BP 96/75 (Patient Position: Lying)   Pulse 90   Temp 96.1 °F (35.6 °C) (Oral)   Resp 18   Ht 5' 1" (1.549 m)   Wt 65.7 kg (144 lb 13.5 oz)   LMP 10/02/2016   SpO2 96%   Breastfeeding? No   BMI 27.37 kg/m²     Labs Reviewed and Include    Recent Labs   Lab 07/04/19  0600   *   CALCIUM 8.0*   ALBUMIN 2.3*   PROT 5.0*   *   K 4.3   CO2 19*      BUN 16   CREATININE 1.3   ALKPHOS 170*   ALT 53*   AST 17   BILITOT 0.3     Lab Results   Component Value Date    WBC 5.72 07/04/2019    HGB 6.7 (L) 07/04/2019    HCT 21.5 (L) 07/04/2019    MCV 90 07/04/2019     (L) 07/04/2019     No results for input(s): TSH, FREET4 in the last 168 hours.  Lab Results   Component Value Date    HGBA1C 4.7 01/09/2019       Nutritional status:   Body mass index is 27.37 kg/m².  Lab Results   Component Value Date    ALBUMIN 2.3 (L) 07/04/2019    ALBUMIN 2.2 (L) 07/03/2019    ALBUMIN 2.4 (L) 07/02/2019     Lab Results   Component Value Date    PREALBUMIN 15 (L) 05/29/2014    PREALBUMIN 11 (L) 05/09/2014    PREALBUMIN 18 (L) 03/19/2014       Estimated Creatinine Clearance: 54.9 mL/min (based on SCr of 1.3 mg/dL).    Accu-Checks  Recent Labs     07/02/19  0748 07/02/19  1328 07/02/19  1802 07/02/19  2137 07/03/19  0258 07/03/19  0729 07/03/19  1149 07/03/19  1739 07/03/19  2117 07/04/19  0842   POCTGLUCOSE 172* 206* 249* 135* 141* 166* 128* 268* 158* 138*       Current Medications and/or Treatments Impacting Glycemic Control  Immunotherapy:    Immunosuppressants         Stop Route Frequency     tacrolimus capsule 0.5 mg      -- SL 2 times daily     mycophenolate (CELLCEPT) 500 mg in dextrose 5 % 100 mL IVPB      -- IV 2 " times daily        Steroids:   Hormones (From admission, onward)    Start     Stop Route Frequency Ordered    07/02/19 0900  predniSONE tablet 20 mg      -- Oral Daily 07/01/19 1148    06/26/19 1827  dexAMETHasone sodium phos (PF) 10 mg/mL injection     Note to Pharmacy:  Created by cabinet override    06/27 0629 06/26/19 1827        Pressors:    Autonomic Drugs (From admission, onward)    None        Hyperglycemia/Diabetes Medications:   Antihyperglycemics (From admission, onward)    Start     Stop Route Frequency Ordered    07/02/19 1543  insulin aspart U-100 pen 1-10 Units      -- SubQ Before meals & nightly PRN 07/02/19 144

## 2019-07-03 NOTE — PROGRESS NOTES
Pt had antibiotics this morning. CLIFF Friedman MD notified. No need to give prophylactic antibiotics for tunneled picc placement.

## 2019-07-03 NOTE — ASSESSMENT & PLAN NOTE
CMV D-/R+. Continue OIP with ganciclovir, fluconazole, and dapsone. Bronch wash 6/20 and 6/23 with  Pseudomonas and Candida Glabrata. Vancomycin discontinued. Continue ciprofloxacin 400 mg Q8hrs.

## 2019-07-03 NOTE — ANESTHESIA PREPROCEDURE EVALUATION
[facility]  Anesthesia Pre-Operative Evaluation         Patient Name: Juanita Ibarra  YOB: 1990  MRN: [id number]    SUBJECTIVE:     Pre-operative evaluation for Procedure(s) (LRB):  Bronchoscopy (N/A)     07/03/2019    Juanita Ibrara is a 29 y.o. 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 In addition has HTN, Anxiety, hx of seizures.    Patient now presents for the above procedure(s).      LDA:   -R subclavian port    Prev airway:   - DL Mac 3, Gr. I View. 6.0 ETT (Post-Laryngoplasty)  - DL Will II, Gr. II View (small mouth, anterior), 7.0 ETT (Pre-Laryngoplasty)    Drips: None documented.    Patient Active Problem List   Diagnosis    Cystic fibrosis with pulmonary manifestations    Sinusitis, chronic    Anxiety disorder    Other pain disorders related to psychological factors    Protein calorie malnutrition    CF related Pancreatic insufficiency    Chronic pain with opiate use    Allergy to multiple antibiotics    Dysphagia, oropharyngeal    Chronic visceral pain (pleura)    Transaminitis    Gallstones    Hyperglycemia    Adrenal cortical steroids causing adverse effect in therapeutic use    Immunosuppression    Complication of lung transplant    Dysphonia    Portacath in place    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    Prophylactic antibiotic    Complication of transplanted lung    Acute rejection of lung transplant    Debility    Muscle spasm of back    Anemia    Headache    Aftercare following organ transplant    Leukopenia    LRTI (lower respiratory tract infection)    SBO (small bowel obstruction)    Sterilization    Lung replaced by transplant    Fever    Cytomegalovirus (CMV) viremia    Hospital acquired PNA    Hyperkalemia, diminished renal  excretion    Acute kidney injury    Other complications of lung transplant    S/P lung transplant    Pneumonia, organism unspecified(486)    Lymphadenopathy    Biliary colic    Unilateral complete vocal fold paralysis    Chronic ethmoidal sinusitis    Mastoiditis    ETD (Eustachian tube dysfunction), bilateral    Dysmenorrhea    Sinus infection    Hypertension    Pneumonia    Current chronic use of systemic steroids    TMJ arthralgia    Adverse effect of anabolic steroid    Steroid-induced hyperglycemia    Opioid overdose    Acute kidney failure with lesion of tubular necrosis    SOB (shortness of breath)    Splenomegaly    Staphylococcus aureus pneumonia    Acute otitis externa of left ear    Lung transplant status    Preoperative testing    Prophylactic immunotherapy    Acute blood loss anemia    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    On enteral nutrition       Review of patient's allergies indicates:   Allergen Reactions    Albuterol Palpitations    Colistin Anaphylaxis    Vancomycin analogues      Infusion reaction that does not resolve with slowing  Pt. States she can tolerate it when given with 50 mg Benadryl and ran over 3 hours    Neupogen [filgrastim] Other (See Comments)     Ostealgia after five daily doses of 300 mcg.      Bactrim [sulfamethoxazole-trimethoprim] Hives    Ceftazidime Hives     Pt stated can tolerate cefapine not ceftazidime    Ceftazidime     Dronabinol Other (See Comments)     Mental changes/hallucinations    Haldol [haloperidol lactate] Other (See Comments)     Seizure like activity    Nsaids (non-steroidal anti-inflammatory drug)      Cannot have due to lung transplant    Adhesive Rash     Cloth tape- please use tegaderm or paper tape    Aztreonam Rash    Ciprofloxacin Nausea And Vomiting     Projectile N/V, per patient.  Unwilling to retry therapy.       Current Outpatient Medications:    Current Facility-Administered  Medications:     acetaminophen tablet 650 mg, 650 mg, Oral, Q6H, Reyes Will NP, Stopped at 07/02/19 0000    acetylcysteine 200 mg/ml (20%) solution 4 mL, 4 mL, Nebulization, Q12H, Reyes Will NP, 4 mL at 06/29/19 0846    bisacodyl suppository 10 mg, 10 mg, Rectal, Daily PRN, Reyes Will NP, 10 mg at 06/28/19 0941    ciprofloxacin (CIPRO)400mg/200ml D5W IVPB 400 mg, 400 mg, Intravenous, Q8H, Reyes Will NP, Last Rate: 200 mL/hr at 07/03/19 0620, 400 mg at 07/03/19 0620    dextrose 10% (D10W) Bolus, 12.5 g, Intravenous, PRN, Francisca Morin,     dextrose 10% (D10W) Bolus, 25 g, Intravenous, PRN, Francisca Morin,     diphenhydrAMINE injection 50 mg, 50 mg, Intravenous, Q6H PRN, Rickey Mitchell MD, 50 mg at 07/02/19 0745    enoxaparin injection 40 mg, 40 mg, Subcutaneous, Daily, Reyes Will NP, 40 mg at 07/02/19 1643    famotidine tablet 20 mg, 20 mg, Oral, BID, Reyes Will NP, 20 mg at 07/03/19 1156    fluconazole tablet 400 mg, 400 mg, Oral, Daily, Reyes Will NP, 400 mg at 07/03/19 1228    gabapentin capsule 300 mg, 300 mg, Oral, TID, Reyes Will NP, 300 mg at 07/02/19 2112    ganciclovir (CYTOVENE) 295 mg in sodium chloride 0.9% 100 mL IVPB, 5 mg/kg, Intravenous, Q12H, Reyes Will NP, Last Rate: 100 mL/hr at 07/03/19 1204, 295 mg at 07/03/19 1204    glucagon (human recombinant) injection 1 mg, 1 mg, Intramuscular, PRN, Amy Zapata NP    glucose chewable tablet 16 g, 16 g, Oral, PRN, Amy Zapata NP    glucose chewable tablet 24 g, 24 g, Oral, PRN, Amy Zapata, NP    HYDROmorphone injection 1 mg, 1 mg, Intravenous, Q4H PRN, Shama Canales PA-C, 1 mg at 07/03/19 1203    insulin aspart U-100 pen 1-10 Units, 1-10 Units, Subcutaneous, QID (AC + HS) PRN, Amy Zapata, KUMAR, 2 Units at 07/03/19 0733    lactulose 20 gram/30 mL solution Soln 20 g, 20 g, Oral, Q6H PRN, Angelica Gomez MD    levalbuterol nebulizer solution 1.25 mg,  1.25 mg, Nebulization, Q6H WAKE, Reyes Will NP, 1.25 mg at 07/02/19 2051    lidocaine 5 % patch 1 patch, 1 patch, Transdermal, Q24H, Reyes Will NP, Stopped at 06/29/19 1245    lidocaine 5 % patch 1 patch, 1 patch, Transdermal, Q24H, Reyes Will NP, Stopped at 06/29/19 1245    lipase-protease-amylase 24,000-76,000-120,000 units capsule 3 capsule, 3 capsule, Oral, With Snacks, Shama Canales PA-C    lipase-protease-amylase 24,000-76,000-120,000 units capsule 4 capsule, 4 capsule, Oral, TID WM, Shama Canales PA-C    lorazepam injection 2 mg, 2 mg, Intravenous, Q4H PRN, Reyes Will NP, 2 mg at 07/03/19 1203    magnesium sulfate 2g in water 50mL IVPB (premix), 2 g, Intravenous, PRN, Angelica Gomez MD, 2 g at 07/03/19 1204    metoclopramide HCl injection 5 mg, 5 mg, Intravenous, Q6H PRN, Reyes Will NP, 5 mg at 06/29/19 1400    metoprolol injection 5 mg, 5 mg, Intravenous, Q6H PRN, Reyes Will NP, 5 mg at 07/01/19 0130    metoprolol tartrate (LOPRESSOR) tablet 25 mg, 25 mg, Oral, TID, Reyes Will NP, 25 mg at 07/03/19 1156    morphine tablet 15 mg, 15 mg, Oral, Q6H, David Wang MD, 15 mg at 07/03/19 1237    mycophenolate (CELLCEPT) 500 mg in dextrose 5 % 100 mL IVPB, 500 mg, Intravenous, BID, Jake Alvarez MD, Last Rate: 50 mL/hr at 07/03/19 1204, 500 mg at 07/03/19 1204    naloxone 0.4 mg/mL injection 0.02 mg, 0.02 mg, Intravenous, PRN, Reyes Will NP    OLANZapine injection 10 mg, 10 mg, Intramuscular, BID PRN, Reyes Will NP, 10 mg at 06/28/19 0339    ondansetron disintegrating tablet 8 mg, 8 mg, Oral, Q8H PRN, Reyes Will NP    ondansetron injection 8 mg, 8 mg, Intravenous, Q6H PRN, Reyes Will NP, 8 mg at 07/03/19 0643    polyethylene glycol packet 17 g, 17 g, Per NG tube, BID, Ryees Will NP, Stopped at 06/29/19 0900    potassium chloride 20 mEq in 100 mL IVPB (FOR CENTRAL LINE ADMINISTRATION ONLY), 20 mEq,  Intravenous, PRN, Last Rate: 50 mL/hr at 06/22/19 0647, 20 mEq at 06/22/19 0647 **AND** potassium chloride 40 mEq in 100 mL IVPB (FOR CENTRAL LINE ADMINISTRATION ONLY), 40 mEq, Intravenous, PRN, Last Rate: 25 mL/hr at 06/18/19 1749, 40 mEq at 06/18/19 1749 **AND** potassium chloride 20 mEq in 100 mL IVPB (FOR CENTRAL LINE ADMINISTRATION ONLY), 60 mEq, Intravenous, PRN, Reyes Will NP    predniSONE tablet 20 mg, 20 mg, Oral, Daily, Reyes Will NP, 20 mg at 07/03/19 1156    promethazine (PHENERGAN) 6.25 mg in dextrose 5 % 50 mL IVPB, 6.25 mg, Intravenous, Q6H PRN, Reyes Will NP, Last Rate: 150 mL/hr at 07/01/19 2030, 6.25 mg at 07/01/19 2030    QUEtiapine tablet 25 mg, 25 mg, Oral, Daily, Reyes Will NP, 25 mg at 07/03/19 1156    QUEtiapine tablet 50 mg, 50 mg, Oral, QHS, Reyes Will NP, 50 mg at 07/02/19 2112    sodium chloride 7% nebulizer solution 4 mL, 4 mL, Nebulization, Q12H, Reyes Will NP, 4 mL at 07/02/19 2056    tacrolimus capsule 0.5 mg, 0.5 mg, Sublingual, BID, Francisca Morin DO, 0.5 mg at 07/03/19 0733    Past Surgical History:   Procedure Laterality Date    ABDOMINAL SURGERY      peg tube     XPHSYFLG-[id number] N/A 5/19/2015    Performed by [facility]y Dias [facility] at [facility]    BIOPSY-BRONCHUS N/A 12/20/2016    Performed by Jake Alvarez MD at [facility] OR 2ND FLR    BRONCHOSCOPY Bilateral 12/11/2018    Performed by Francisca Morin DO at [facility] OR 2ND FLR    BRONCHOSCOPY N/A 10/1/2018    Performed by Jake Alvarez MD at [facility] OR 2ND FLR    BRONCHOSCOPY Bilateral 12/20/2016    Performed by Jake Alvarez MD at [facility] OR 2ND FLR    BRONCHOSCOPY - flexible bronchoscopy with probable tissue biopsy CPT 55392 N/A 7/21/2015    Performed by Jake Alvarez MD at [facility] OR [address] [place] [address]    BRONCHOSCOPY - flexible bronchoscopy with probable tissue biospy CPT 50390 N/A 10/20/2015    Performed by Jake Alvarez MD at [facility] OR [address] [place] [address]     BRONCHOSCOPY - flexible bronchoscopy with tissue biopsy CPT 40870 N/A 9/29/2015    Performed by Jake Alvarez MD at [facility] OR 2ND FLR    BRONCHOSCOPY - flexible bronchoscopy with tissue biopsy CPT 22418 N/A 5/26/2015    Performed by Jake Alvarez MD at [facility] OR 1ST FLR    BRONCHOSCOPY flexible bronchoscopy with tissue biopsy N/A 9/8/2014    Performed by Jake Alvarez MD at [facility] OR 2ND FLR    BRONCHOSCOPY flexible with possible tissue biopsy N/A 8/8/2014    Performed by Jake Alvarez MD at [facility] OR 2ND FLR    BRONCHOSCOPY, BAL, BIOPSIES N/A 3/20/2018    Performed by Jake Alvarez MD at [facility] OR 2ND FLR    BRONCHOSCOPY-FIBEROPTIC N/A 1/29/2016    Performed by Joann Cifuentes DO at [facility] OR 2ND FLR    BRONCHOSCOPY; Bronchoscopy with BAL and transbronchial biopsies under general anesthesia N/A 5/29/2018    Performed by Jake Alvarez MD at [facility] OR 2ND FLR    CHOLECYSTECTOMY  2016    CHOLECYSTECTOMY-LAPAROSCOPIC N/A 7/22/2016    Performed by Joshua Goldberg, MD at [facility] OR 2ND FLR    COLONOSCOPY N/A 5/3/2019    Performed by Juancho Rich MD at [facility] ENDO (2ND FLR)    ENDOMETRIAL ABLATION  2015    KJB    ETHMOIDECTOMY Bilateral 4/9/2019    Performed by Adin Burks III, MD at [facility] OR 2ND FLR    FESS      FESS Bilateral 10/21/2013    Performed by Jared Whatley MD at [facility] OR 2ND FLR    FESS, USING COMPUTER-ASSISTED NAVIGATION N/A 4/9/2019    Performed by Adin Burks III, MD at [facility] OR 2ND FLR    FINE NEEDLE ASPIRATION (FNA)  of a cervical lymphadenopathy  1/29/2016    Performed by Joann Cifuentes DO at [facility] OR 2ND FLR    flex bronchoscopy with probable tissue biopsy N/A 7/31/2014    Performed by [facility] at [facility] OR 2ND FLR    flexible bronchoscopy with tissue biopsy N/A 12/11/2014    Performed by Jake Alvarez MD at [facility] OR 2ND FLR    HYSTERECTOMY  04/2017    [facility]    INJECTION-VOCAL CORD Left 6/24/2014    Performed by Jared Whatley MD at  [facility] OR 2ND FLR    [id number]-P-CATH to right chest and removal of portacath to left chests wall. Bilateral 6/24/2014    Performed by Zhen Dorado MD at [facility] OR [address] [place] [address]    LARYNX SURGERY  2016    LUNG TRANSPLANT Bilateral 6/12/14    MAXILLARY ANTROSTOMY  4/9/2019    Performed by Adin Burks III, MD at [facility] OR [address] [place] [address]    MYRINGOTOMY W/ TUBES Right 04/2017    MYRINGOTOMY WITH INSERTION OF PE TUBES Right 4/15/2017    Performed by Gary Null MD at [facility] OR [facility]    PLACEMENT-TUBE-PEG  5/15/2014    Performed by David Moses MD at [facility] OR [address] [place] [address]    PORTACATH PLACEMENT Right     rt sc    REDO BILATERAL LUNG TRANSPLANT; CLAMSHELL Bilateral 6/18/2019    Performed by Jonathan Newby MD at [facility] OR [address] [place] [address]    REMOVAL-TUBE-PEG  5/15/2014    Performed by David Moses MD at [facility] OR [address] [place] [address]    RHC for lung re-transplant N/A 1/22/2019    Performed by Laura Rachel MD at [facility] CATH LAB    ROBOTIC ASSISTED LAPAROSCOPIC HYSTERECTOMY N/A 4/25/2017    Performed by Deny Dias Jr., MD at [facility] OR    SALPINGECTOMY Bilateral 2015    KJB    SALPINGECTOMY-LAPAROSCOPIC Bilateral 5/19/2015    Performed by Deny Dias Jr., MD at [facility] OR    SINUS SURGERY FUNCTIONAL ENDOSCOPIC WITH NAVIGATION Bilateral 4/3/2017    Performed by Cesar Olsen MD at [facility] OR [address] [place] [address]    SINUS SURGERY FUNCTIONAL ENDOSCOPIC WITH NAVIGATION, 15286, 99103, 38034, 69399 Bilateral 2/5/2015    [facility]    SPHENOIDECTOMY Bilateral 4/9/2019    Performed by Adin Burks III, MD at [facility] OR [address] [place] [address]    THYROPLASTY - Medialization laryngoplasty, cricothyroid subluxation, arytenoid repositioning Left 8/2/2016    Performed by Gary Null [facility] at [facility]    TRANSPLANT-LUNG Bilateral 6/11/2014    Performed by Adolfo Huston MD at [facility] OR [address] [place] [address]       Social History     Socioeconomic History    Marital status: Single     Spouse name: Not on file     Number of children: Not on file    Years of education: Not on file    Highest education level: Not on file   Occupational History    Not on file   Social Needs    Financial resource strain: Not on file    Food insecurity:     Worry: Not on file     Inability: Not on file    Transportation needs:     Medical: Not on file     Non-medical: Not on file   Tobacco Use    Smoking status: Never Smoker    Smokeless tobacco: Never Used   Substance and Sexual Activity    Alcohol use: No     Comment: Has not had an alcoholic drink in more than 2 months.    Drug use: No    Sexual activity: Yes     Partners: Male     Birth control/protection: See Surgical Hx     Comment: current partner since Oct 2016   Lifestyle    Physical activity:     Days per week: Not on file     Minutes per session: Not on file    Stress: Not on file   Relationships    Social connections:     Talks on phone: Not on file     Gets together: Not on file     Attends [employer] service: Not on file     Active member of club or organization: Not on file     Attends meetings of clubs or organizations: Not on file     Relationship status: Not on file   Other Topics Concern    Not on file   Social History Narrative    Not on file       OBJECTIVE:     Vital Signs Range (Last 24H):  Temp:  [36.5 °C (97.7 °F)-37.4 °C (99.4 °F)]   Pulse:  []   Resp:  [12-20]   BP: ()/(61-84)   SpO2:  [92 %-100 %]       Significant Labs:  Lab Results   Component Value Date    WBC 7.48 2019    HGB 7.5 (L) 2019    HCT 24.3 (L) 2019     (L) 2019    CHOL 129 2019    TRIG 86 2019    HDL 48 2019    ALT 70 (H) 2019    AST 20 2019     2019    K 4.6 2019     2019    CREATININE 1.0 2019    BUN 17 2019    CO2 19 (L) 2019    TSH 3.858 2019    INR 0.9 2019    HGBA1C 4.7 2019       Diagnostic Studies: No relevant studies.    EK19: Normal  sinus rhythm  T wave abnormality, consider anterior ischemia  Abnormal ECG  When compared with ECG of 17-JUN-2019 23:57,  ST no longer elevated in Inferior leads  QT has lengthened    2D ECHO:  No results found. However, due to the size of the patient record, not all encounters were searched. Please check Results Review for a complete set of results.      ASSESSMENT/PLAN:                                                                                                           07/03/2019  Juanita Ibarra is a 29 y.o., female.    Anesthesia Evaluation    I have reviewed the Patient Summary Reports.    I have reviewed the Nursing Notes.   I have reviewed the Medications.   Prednisone    Review of Systems  Anesthesia Hx:  No problems with previous Anesthesia Denies Hx of Anesthetic complications  History of prior surgery of interest to airway management or planning: lung surgery. Previous anesthesia: General Denies Family Hx of Anesthesia complications.   Denies Personal Hx of Anesthesia complications.   Social:  Non-Smoker, No Alcohol Use    Hematology/Oncology:     Oncology Normal     EENT/Dental:EENT/Dental Normal   Cardiovascular:   Hypertension    Pulmonary:   Pneumonia Shortness of breath    Renal/:  Renal/ Normal  Denies Chronic Renal Disease.     Hepatic/GI:  Hepatic/GI Normal    Neurological:   Headaches Seizures    Endocrine:   Denies Diabetes.    Psych:   Psychiatric History          Physical Exam  General:  Well nourished    Airway/Jaw/Neck:  Airway Findings: Mouth Opening: Normal Tongue: Normal  General Airway Assessment: Adult  Mallampati: III  TM Distance: Normal, at least 6 cm  Jaw/Neck Findings:  Neck ROM: Normal ROM     Eyes/Ears/Nose:  EYES/EARS/NOSE FINDINGS: Normal   Dental:  Dental Findings: In tact   Chest/Lungs:  Chest/Lungs Findings: Clear to auscultation     Heart/Vascular:  Heart Findings: Rate: Normal     Abdomen:  Abdomen Findings: Normal    Musculoskeletal:  Musculoskeletal  Findings: Normal   Skin:  Skin Findings: Normal    Mental Status:  Mental Status Findings:  Cooperative, Lethargic         Anesthesia Plan  Type of Anesthesia, risks & benefits discussed:  Anesthesia Type:  general, MAC  Patient's Preference:   Intra-op Monitoring Plan: standard ASA monitors  Intra-op Monitoring Plan Comments:   Post Op Pain Control Plan: multimodal analgesia, IV/PO Opioids PRN and per primary service following discharge from PACU  Post Op Pain Control Plan Comments:   Induction:   IV  Beta Blocker:  Patient is on a Beta-Blocker and has received one dose within the past 24 hours (No further documentation required).       Informed Consent: Patient representative understands risks and agrees with Anesthesia plan.  Questions answered. Anesthesia consent signed with patient representative.  ASA Score: 4     Day of Surgery Review of History & Physical:            Ready For Surgery From Anesthesia Perspective.

## 2019-07-03 NOTE — ASSESSMENT & PLAN NOTE
POD#15 s/p bilateral lung transplant (re-transplant) for CARLOS EDUARDO. 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 PGD 0. S/p bronch and extubated to HFNC on POD#2, but was re-intubated 6/21 for hypercapnic respiratory failure. Right pigtail placed for PTX on 6/29. Discussed removing remaining chest tubes with CTS today.     Continue PT/OT/IS/CPT. Continue immunosuppression and prophylaxis. OR bronchoscopy scheduled for 7/5 at 10:30.

## 2019-07-03 NOTE — PROGRESS NOTES
Procedure complete. Pt tolerated well. Site clean, dry, intact, no bleeding, no hematoma. Report called to RN on floor. Pt to recover in ROCU before returning to room. Report will be given to RN at bedside.

## 2019-07-03 NOTE — PROGRESS NOTES
[facility]  Lung Transplant  Progress Note - Floor    Patient Name: Juanita Ibarra  MRN: [id number]  Admission Date: 6/17/2019  Hospital Length of Stay: 15 days  Post-Operative Day: 1847 (Lung), 15 (Lung)  Attending Physician: Francisca Morin DO  Primary Care Provider: Primary Doctor No     Subjective:     Interval History: No acute events overnight. Pain well controlled.     Continuous Infusions:    Scheduled Meds:   acetaminophen  650 mg Oral Q6H    acetylcysteine 200 mg/ml (20%)  4 mL Nebulization Q12H    ciprofloxacin  400 mg Intravenous Q8H    enoxaparin  40 mg Subcutaneous Daily    famotidine  20 mg Oral BID    fluconazole  400 mg Oral Daily    gabapentin  300 mg Oral TID    ganciclovir (CYTOVENE) IVPB  5 mg/kg Intravenous Q12H    levalbuterol  1.25 mg Nebulization Q6H WAKE    lidocaine  1 patch Transdermal Q24H    lidocaine  1 patch Transdermal Q24H    lipase-protease-amylase 24,000-76,000-120,000 units  4 capsule Oral TID WM    metoprolol tartrate  25 mg Oral TID    morphine  15 mg Oral Q6H    mycophenolate (CELLCEPT) IVPB  500 mg Intravenous BID    polyethylene glycol  17 g Per NG tube BID    predniSONE  20 mg Oral Daily    QUEtiapine  25 mg Oral Daily    QUEtiapine  50 mg Oral QHS    sodium chloride 7%  4 mL Nebulization Q12H    tacrolimus  0.5 mg Sublingual BID     PRN Meds:bisacodyl, Dextrose 10% Bolus, Dextrose 10% Bolus, diphenhydrAMINE, glucagon (human recombinant), glucose, glucose, HYDROmorphone, insulin aspart U-100, lactulose, lipase-protease-amylase 24,000-76,000-120,000 units, lorazepam, magnesium sulfate IVPB, metoclopramide HCl, metoprolol, naloxone, OLANZapine, ondansetron, ondansetron, potassium chloride in water **AND** potassium chloride in water **AND** potassium chloride in water, promethazine (PHENERGAN) IVPB    Review of patient's allergies indicates:   Allergen Reactions    Albuterol Palpitations    Colistin Anaphylaxis     Vancomycin analogues      Infusion reaction that does not resolve with slowing  Pt. States she can tolerate it when given with 50 mg Benadryl and ran over 3 hours    Neupogen [filgrastim] Other (See Comments)     Ostealgia after five daily doses of 300 mcg.      Bactrim [sulfamethoxazole-trimethoprim] Hives    Ceftazidime Hives     Pt stated can tolerate cefapine not ceftazidime    Ceftazidime     Dronabinol Other (See Comments)     Mental changes/hallucinations    Haldol [haloperidol lactate] Other (See Comments)     Seizure like activity    Nsaids (non-steroidal anti-inflammatory drug)      Cannot have due to lung transplant    Adhesive Rash     Cloth tape- please use tegaderm or paper tape    Aztreonam Rash    Ciprofloxacin Nausea And Vomiting     Projectile N/V, per patient.  Unwilling to retry therapy.       Review of Systems   Constitutional: Negative for appetite change, chills, diaphoresis and fever.   HENT: Negative for congestion, postnasal drip, rhinorrhea, sinus pressure and sinus pain.    Respiratory: Positive for cough and shortness of breath. Negative for wheezing.    Cardiovascular: Positive for chest pain (insertion site). Negative for palpitations and leg swelling.   Gastrointestinal: Negative for abdominal distention, diarrhea, nausea and vomiting.   Genitourinary: Negative for decreased urine volume, difficulty urinating, dysuria and urgency.   Musculoskeletal: Positive for back pain (chronic).   Skin: Negative for rash and wound.   Allergic/Immunologic: Positive for immunocompromised state.   Neurological: Positive for weakness. Negative for dizziness, syncope and headaches.   Psychiatric/Behavioral: Negative for confusion. The patient is nervous/anxious.      Objective:   Physical Exam   Constitutional: She appears well-developed and well-nourished. She is easily aroused. No distress.   HENT:   Head: Normocephalic and atraumatic.   Nose: Nose normal.   Eyes: Conjunctivae and EOM are  normal. No scleral icterus.   Neck: No JVD present.   Right IJ CVC, CDI   Cardiovascular: Regular rhythm. Tachycardia present. Exam reveals no friction rub.   No murmur heard.  Pulmonary/Chest: No accessory muscle usage. No respiratory distress. She has decreased breath sounds in the right lower field and the left lower field. She has no wheezes. She has rhonchi in the right upper field and the left upper field. She has no rales.   Bilateral chest tubes to suction, no air leak   Abdominal: Soft. Bowel sounds are normal. She exhibits no distension. There is no tenderness.   Musculoskeletal: She exhibits no edema.   Neurological: She is alert and easily aroused. No cranial nerve deficit.   Skin: Skin is warm and dry. No rash noted. She is not diaphoretic. No erythema.   Psychiatric: Her mood appears anxious.   Nursing note and vitals reviewed.        Vital Signs (Most Recent):  Temp: 98.6 °F (37 °C) (07/03/19 1145)  Pulse: (!) 115 (07/03/19 1145)  Resp: 18 (07/03/19 1145)  BP: 118/82 (07/03/19 1145)  SpO2: 98 % (07/03/19 1145) Vital Signs (24h Range):  Temp:  [97.7 °F (36.5 °C)-99.4 °F (37.4 °C)] 98.6 °F (37 °C)  Pulse:  [] 115  Resp:  [12-20] 18  SpO2:  [92 %-100 %] 98 %  BP: (108-133)/(68-84) 118/82     Weight: 65.7 kg (144 lb 13.5 oz)  Body mass index is 27.37 kg/m².      Intake/Output Summary (Last 24 hours) at 7/3/2019 1329  Last data filed at 7/3/2019 1215  Gross per 24 hour   Intake 690 ml   Output 540 ml   Net 150 ml       Ventilator Data:          Hemodynamic Parameters:       Lines/Drains:       Introducer right internal jugular (Active)   Specific Qualities Capped 6/30/2019  7:10 AM   Dressing Status Clean;Dry;Biopatch in place;Intact 6/30/2019  7:10 AM   Dressing Intervention Dressing reinforced 6/29/2019  3:00 AM   Dressing Change Due 06/29/19 6/30/2019  3:00 AM   Daily Line Review Performed 6/30/2019  7:10 AM   Number of days:             Port A Cath Single Lumen 06/24/14 1200 right subclavian  (Active)   Accessed by: Radha Lanza 6/24/2019  5:00 AM   Dressing Type Transparent 6/30/2019  7:10 AM   Dressing Status Biopatch in place;Clean;Dry;Intact 6/30/2019  7:10 AM   Dressing Intervention Dressing reinforced 6/29/2019  3:00 AM   Dressing Change Due 06/30/19 6/30/2019  3:00 AM   Line Status Infusing 6/30/2019  7:10 AM   Flush Performed Yes 6/30/2019  7:10 AM   Date to be Reflushed 06/18/19 6/29/2019  3:00 AM   Daily Line Review Performed 6/30/2019  7:10 AM   Type of Needle Nicole 6/30/2019  3:00 AM   Gauge 20 6/30/2019  3:00 AM   Needle Length 1 in 6/30/2019  3:00 AM   Needle Status Left in place 6/30/2019  3:00 AM   Needle Insertion Date 06/24/19 6/30/2019  3:00 AM   Needle Insertion Time 0500 6/26/2019  3:00 AM   Number of days: 1831            Percutaneous Central Line Insertion/Assessment - double lumen  06/18/19 0510 (Active)   Dressing biopatch in place;dressing dry and intact 6/30/2019  7:10 AM   Securement secured w/ sutures 6/30/2019  7:10 AM   Additional Site Signs no erythema;no warmth 6/30/2019  7:10 AM   Distal Patency/Care infusing 6/30/2019  7:10 AM   Proximal Patency/Care infusing 6/30/2019  7:10 AM   Waveform normal 6/30/2019  7:10 AM   Line Interventions line leveled/zeroed 6/30/2019  7:10 AM   Dressing Change Due 06/29/19 6/30/2019  3:00 AM   Daily Line Review Performed 6/30/2019  7:10 AM   Number of days: 12            Peripheral IV - Single Lumen 06/27/19 0917 22 G Right Forearm (Active)   Site Assessment Clean;Dry;Intact;No redness;No swelling 6/30/2019  7:10 AM   Line Status Infusing 6/30/2019  7:10 AM   Dressing Status Clean;Dry;Intact 6/30/2019  7:10 AM   Dressing Intervention Dressing reinforced 6/29/2019  3:00 AM   Dressing Change Due 07/01/19 6/30/2019  3:00 AM   Site Change Due 07/01/19 6/29/2019  7:00 PM   Reason Not Rotated Not due 6/30/2019  7:10 AM   Number of days: 3            Chest Tube 06/29/19 1655 1 Right Fourth intercostal space;Midaxillary 7 Fr. (Active)   Chest  Tube WDL WDL 6/30/2019  7:10 AM   Function -20 cm H2O 6/30/2019  7:10 AM   Air Leak/Fluctuation air leak present 6/30/2019  7:10 AM   Safety all tubing connections taped;2 rubber-tipped hemostats w/ patient;all connections secured;suction checked 6/30/2019  7:10 AM   Securement tubing secured to body distal to insertion site w/ tape 6/30/2019  7:10 AM   Patency Intervention Tip/tilt 6/30/2019  7:10 AM   Drainage Description Serosanguineous 6/30/2019  7:10 AM   Dressing Appearance occlusive gauze dressing intact 6/30/2019  7:10 AM   Dressing Care dressing changed 6/30/2019 10:00 AM   Left Subcutaneous Emphysema none present 6/30/2019  7:10 AM   Right Subcutaneous Emphysema none present 6/30/2019  7:10 AM   Site Assessment Clean;Dry;Intact;No redness;No swelling;Ecchymotic 6/30/2019 10:00 AM   Surrounding Skin Dry;Intact 6/30/2019  7:10 AM   Output (mL) 0 mL 6/30/2019 10:00 AM   Number of days: 0            Y Chest Tube 3 and 4 06/18/19 1151 3 Left Pleural 19 Fr. 4 Left Mediastinal 19 Fr. (Active)   Function -20 cm H2O 6/30/2019  7:10 AM   Air Leak/Fluctuation air leak not present 6/30/2019  7:10 AM   Safety all tubing connections taped;2 rubber-tipped hemostats w/ patient;all connections secured;suction checked 6/30/2019  7:10 AM   Securement tubing secured to body distal to insertion site w/ tape 6/30/2019  7:10 AM   Left Subcutaneous Emphysema none present 6/30/2019  7:10 AM   Right Subcutaneous Emphysema none present 6/30/2019  7:10 AM   Patency Intervention Tip/tilt 6/30/2019  7:10 AM   Drainage Description 3 Serosanguineous 6/30/2019  7:10 AM   Tube 3 Dressing Appearance occlusive gauze dressing intact 6/30/2019  7:10 AM   Tube 3 Dressing Care dressing changed 6/30/2019 10:00 AM   Site Assessment 3 Clean;Dry;Intact 6/30/2019 10:00 AM   Surrounding Skin 3 Dry;Intact 6/30/2019  7:10 AM   Drainage Description 4 Serosanguineous 6/30/2019  7:10 AM   Tube 4 Dressing Appearance occlusive gauze dressing intact 6/30/2019   7:10 AM   Tube 4 Dressing Care dressing changed 6/30/2019 10:00 AM   Site Assessment 4 Clean;Dry;Intact 6/30/2019 10:00 AM   Surrounding Skin 4 Dry;Intact 6/30/2019  7:10 AM   Output (mL) 0 mL 6/30/2019 10:00 AM   Number of days: 11       Significant Labs:  CBC:  Recent Labs   Lab 07/03/19 0530   WBC 7.48   RBC 2.72*   HGB 7.5*   HCT 24.3*   *   MCV 89   MCH 27.6   MCHC 30.9*     BMP:  Recent Labs   Lab 07/03/19  0530      K 4.6      CO2 19*   BUN 17   CREATININE 1.0   CALCIUM 8.0*      Tacrolimus Levels:  Recent Labs   Lab 07/03/19 0530   TACROLIMUS 8.5     Microbiology:  Microbiology Results (last 7 days)     Procedure Component Value Units Date/Time    Fungus culture [[id number]] Collected:  06/20/19 0808    Order Status:  Completed Specimen:  Respiratory from Bronchial Wash Updated:  07/03/19 1000     Fungus (Mycology) Culture Culture in progress      No fungus isolated after 2 weeks    Fungus culture [[id number]]  (Abnormal) Collected:  06/18/19 0930    Order Status:  Completed Specimen:  Tissue from Lung, Left Updated:  06/27/19 1055     Fungus (Mycology) Culture CANDIDA ALBICANS  Moderate      Narrative:       Donor lung    Fungus culture [[id number]]  (Abnormal) Collected:  06/23/19 1735    Order Status:  Completed Specimen:  Respiratory from Bronchial Wash Updated:  06/27/19 1048     Fungus (Mycology) Culture BRANDI GLABRATA  Few      Culture, Respiratory [[id number]]  (Susceptibility) Collected:  06/25/19 0900    Order Status:  Completed Specimen:  Respiratory from Bronchial Wash Updated:  06/27/19 0905     Respiratory Culture No S aureus isolated.      PSEUDOMONAS AERUGINOSA   Few       Gram Stain (Respiratory) <10 epithelial cells per low power field.     Gram Stain (Respiratory) Many WBC's     Gram Stain (Respiratory) No organisms seen    Narrative:       Bronchial Wash    AFB Culture & Smear [[id number]] Collected:  06/25/19 0900    Order Status:  Completed Specimen:  Respiratory  from Bronchial Wash Updated:  06/26/19 2127     AFB Culture & Smear Culture in progress     AFB CULTURE STAIN No acid fast bacilli seen.    Narrative:       Bronchial Wash          I have reviewed all pertinent labs within the past 24 hours.    Diagnostic Results:  Chest X-Ray: Vascular catheters, right pleural drain and monitoring leads remain.  There is some high-density material in the upper abdomen likely from the modified barium swallow.  Heart is enlarged there is mild increase in the pulmonary vascular markings.  Persistent parenchymal opacification bilaterally.  No significant pneumothorax seen.  Probable right pleural effusion.           Assessment/Plan:     * S/P lung transplant  POD#15 s/p bilateral lung transplant (re-transplant) for CARLOS EDUARDO. 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 PGD 0. S/p bronch and extubated to HFNC on POD#2, but was re-intubated 6/21 for hypercapnic respiratory failure. Right pigtail placed for PTX on 6/29. Discussed removing remaining chest tubes with CTS today.     Continue PT/OT/IS/CPT. Continue immunosuppression and prophylaxis. OR bronchoscopy scheduled for 7/5 at 10:30.     Immunosuppression  Previously on tacrolimus, MMF, and daily prednisone. S/p solumedrol in OR, basiliximab on POD#0 and POD#4. Continue MMF, tacrolimus, and steroid taper. Monitor daily tacrolimus levels and adjust dose as needed.       Prophylactic antibiotic  CMV D-/R+. Continue OIP with ganciclovir, fluconazole, and dapsone. Bronch wash 6/20 and 6/23 with  Pseudomonas and Candida Glabrata. Vancomycin discontinued. Continue ciprofloxacin 400 mg Q8hrs.    Acute blood loss anemia  Received 6u PRBCs, 2u plts, 1 cryo, and 4 FFP angie-operatively. Will be conservative with future transfusions.    Adrenal cortical steroids causing adverse effect in therapeutic use  Endocrinology consulted, appreciate recs.    Chronic pain with opiate use  Anesthesia consulted for pain mgmt. Continue aggressive bowel regimen. VBG  PRN to watch for development of hypercapnia. Pain management following, appreciate recs.     CF related Pancreatic insufficiency  Continue creon with meals and snacks.     Steroid-induced hyperglycemia  Endocrine following, appreciate recs.     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 Continue Ciprofloxacin, Tobramycin x1 on 6/22.         Shama Canales PA-C  Lung Transplant  [facility]

## 2019-07-04 NOTE — PLAN OF CARE
Problem: Adult Inpatient Plan of Care  Goal: Plan of Care Review  Outcome: Ongoing (interventions implemented as appropriate)  -Pt AAOx4  -Vital signs stable; pt remains on RA  -Telemetry monitoring ST ; HR ranging in the low 100s to 130s  -BG monitoring ACHS and 0200  -Incision under breast KENNETH with dermabind  -CT to Wall wall suction, see flowsheet for output value  -PRN ativan, dilaudid, zofran given  -MBSS complete today. Diet advanced to dental soft diet with nektar thick liquids.   -Tunneled PICC placed yesterday  -Plan for bronch on Friday  -Contact precautions maintained  - at bedside  -Fall precautions maintained, non skid socks worn  -No acute events/falls/injuries this shift  -Pt aware to call for help with ambulating.    -Bed lowered, locked, siderails up x2, and call bell in reach.     See flowsheet for assessment findings.  Will continue to monitor pt.

## 2019-07-04 NOTE — SUBJECTIVE & OBJECTIVE
Subjective:     Interval History: No acute events overnight. Pain well controlled. Tolerating diet. OR bronchoscopy tomorrow. Cough improving and starting to clear secretions.     Continuous Infusions:    Scheduled Meds:   acetylcysteine 200 mg/ml (20%)  4 mL Nebulization Q12H    ciprofloxacin  400 mg Intravenous Q8H    enoxaparin  40 mg Subcutaneous Daily    famotidine  20 mg Oral BID    fluconazole  400 mg Oral Daily    gabapentin  300 mg Oral TID    ganciclovir (CYTOVENE) IVPB  5 mg/kg Intravenous Q12H    levalbuterol  1.25 mg Nebulization Q6H WAKE    lidocaine  1 patch Transdermal Q24H    lidocaine  1 patch Transdermal Q24H    lipase-protease-amylase 24,000-76,000-120,000 units  4 capsule Oral TID WM    metoprolol tartrate  25 mg Oral TID    morphine  15 mg Oral Q6H    mycophenolate (CELLCEPT) IVPB  500 mg Intravenous BID    polyethylene glycol  17 g Per NG tube BID    predniSONE  20 mg Oral Daily    QUEtiapine  25 mg Oral Daily    QUEtiapine  50 mg Oral QHS    sodium chloride 7%  4 mL Nebulization Q12H    tacrolimus  0.5 mg Sublingual BID     PRN Meds:bisacodyl, Dextrose 10% Bolus, Dextrose 10% Bolus, diphenhydrAMINE, glucagon (human recombinant), glucose, glucose, HYDROmorphone, insulin aspart U-100, lactulose, lipase-protease-amylase 24,000-76,000-120,000 units, lorazepam, magnesium sulfate IVPB, metoclopramide HCl, metoprolol, naloxone, OLANZapine, ondansetron, ondansetron, potassium chloride in water **AND** potassium chloride in water **AND** potassium chloride in water, promethazine (PHENERGAN) IVPB    Review of patient's allergies indicates:   Allergen Reactions    Albuterol Palpitations    Colistin Anaphylaxis    Vancomycin analogues      Infusion reaction that does not resolve with slowing  Pt. States she can tolerate it when given with 50 mg Benadryl and ran over 3 hours    Neupogen [filgrastim] Other (See Comments)     Ostealgia after five daily doses of 300 mcg.      Bactrim  [sulfamethoxazole-trimethoprim] Hives    Ceftazidime Hives     Pt stated can tolerate cefapine not ceftazidime    Ceftazidime     Dronabinol Other (See Comments)     Mental changes/hallucinations    Haldol [haloperidol lactate] Other (See Comments)     Seizure like activity    Nsaids (non-steroidal anti-inflammatory drug)      Cannot have due to lung transplant    Adhesive Rash     Cloth tape- please use tegaderm or paper tape    Aztreonam Rash    Ciprofloxacin Nausea And Vomiting     Projectile N/V, per patient.  Unwilling to retry therapy.       Review of Systems   Constitutional: Negative for appetite change, chills, diaphoresis and fever.   HENT: Negative for congestion, postnasal drip, rhinorrhea, sinus pressure and sinus pain.    Respiratory: Positive for cough and shortness of breath. Negative for wheezing.    Cardiovascular: Positive for chest pain (insertion site). Negative for palpitations and leg swelling.   Gastrointestinal: Negative for abdominal distention, diarrhea, nausea and vomiting.   Genitourinary: Negative for decreased urine volume, difficulty urinating, dysuria and urgency.   Musculoskeletal: Positive for back pain (chronic).   Skin: Negative for rash and wound.   Allergic/Immunologic: Positive for immunocompromised state.   Neurological: Positive for weakness. Negative for dizziness, syncope and headaches.   Psychiatric/Behavioral: Negative for confusion. The patient is nervous/anxious.      Objective:   Physical Exam   Constitutional: She appears well-developed and well-nourished. She is easily aroused. No distress.   HENT:   Head: Normocephalic and atraumatic.   Nose: Nose normal.   Eyes: Conjunctivae and EOM are normal. No scleral icterus.   Neck: No JVD present.   Right IJ CVC, CDI   Cardiovascular: Regular rhythm. Tachycardia present. Exam reveals no friction rub.   No murmur heard.  Pulmonary/Chest: No accessory muscle usage. No respiratory distress. She has decreased breath  sounds in the right lower field and the left lower field. She has no wheezes. She has rhonchi in the right upper field and the left upper field. She has no rales.   Bilateral chest tubes to suction, no air leak   Abdominal: Soft. Bowel sounds are normal. She exhibits no distension. There is no tenderness.   Musculoskeletal: She exhibits no edema.   Neurological: She is alert and easily aroused. No cranial nerve deficit.   Skin: Skin is warm and dry. No rash noted. She is not diaphoretic. No erythema.   Psychiatric: Her mood appears anxious.   Nursing note and vitals reviewed.        Vital Signs (Most Recent):  Temp: 96.1 °F (35.6 °C) (07/04/19 0735)  Pulse: 90 (07/04/19 0827)  Resp: 18 (07/04/19 0827)  BP: 96/75 (07/04/19 0735)  SpO2: 96 % (07/04/19 0827) Vital Signs (24h Range):  Temp:  [96.1 °F (35.6 °C)-98.8 °F (37.1 °C)] 96.1 °F (35.6 °C)  Pulse:  [] 90  Resp:  [16-18] 18  SpO2:  [93 %-99 %] 96 %  BP: ()/(60-83) 96/75     Weight: 65.7 kg (144 lb 13.5 oz)  Body mass index is 27.37 kg/m².      Intake/Output Summary (Last 24 hours) at 7/4/2019 1058  Last data filed at 7/3/2019 2319  Gross per 24 hour   Intake 850 ml   Output 500 ml   Net 350 ml       Ventilator Data:          Hemodynamic Parameters:       Lines/Drains:       Introducer right internal jugular (Active)   Specific Qualities Capped 6/30/2019  7:10 AM   Dressing Status Clean;Dry;Biopatch in place;Intact 6/30/2019  7:10 AM   Dressing Intervention Dressing reinforced 6/29/2019  3:00 AM   Dressing Change Due 06/29/19 6/30/2019  3:00 AM   Daily Line Review Performed 6/30/2019  7:10 AM   Number of days:             Port A Cath Single Lumen 06/24/14 1200 right subclavian (Active)   Accessed by: Radha Lanza 6/24/2019  5:00 AM   Dressing Type Transparent 6/30/2019  7:10 AM   Dressing Status Biopatch in place;Clean;Dry;Intact 6/30/2019  7:10 AM   Dressing Intervention Dressing reinforced 6/29/2019  3:00 AM   Dressing Change Due 06/30/19 6/30/2019   3:00 AM   Line Status Infusing 6/30/2019  7:10 AM   Flush Performed Yes 6/30/2019  7:10 AM   Date to be Reflushed 06/18/19 6/29/2019  3:00 AM   Daily Line Review Performed 6/30/2019  7:10 AM   Type of Needle Nicole 6/30/2019  3:00 AM   Gauge 20 6/30/2019  3:00 AM   Needle Length 1 in 6/30/2019  3:00 AM   Needle Status Left in place 6/30/2019  3:00 AM   Needle Insertion Date 06/24/19 6/30/2019  3:00 AM   Needle Insertion Time 0500 6/26/2019  3:00 AM   Number of days: 1831            Percutaneous Central Line Insertion/Assessment - double lumen  06/18/19 0510 (Active)   Dressing biopatch in place;dressing dry and intact 6/30/2019  7:10 AM   Securement secured w/ sutures 6/30/2019  7:10 AM   Additional Site Signs no erythema;no warmth 6/30/2019  7:10 AM   Distal Patency/Care infusing 6/30/2019  7:10 AM   Proximal Patency/Care infusing 6/30/2019  7:10 AM   Waveform normal 6/30/2019  7:10 AM   Line Interventions line leveled/zeroed 6/30/2019  7:10 AM   Dressing Change Due 06/29/19 6/30/2019  3:00 AM   Daily Line Review Performed 6/30/2019  7:10 AM   Number of days: 12            Peripheral IV - Single Lumen 06/27/19 0917 22 G Right Forearm (Active)   Site Assessment Clean;Dry;Intact;No redness;No swelling 6/30/2019  7:10 AM   Line Status Infusing 6/30/2019  7:10 AM   Dressing Status Clean;Dry;Intact 6/30/2019  7:10 AM   Dressing Intervention Dressing reinforced 6/29/2019  3:00 AM   Dressing Change Due 07/01/19 6/30/2019  3:00 AM   Site Change Due 07/01/19 6/29/2019  7:00 PM   Reason Not Rotated Not due 6/30/2019  7:10 AM   Number of days: 3            Chest Tube 06/29/19 1655 1 Right Fourth intercostal space;Midaxillary 7 Fr. (Active)   Chest Tube WDL WDL 6/30/2019  7:10 AM   Function -20 cm H2O 6/30/2019  7:10 AM   Air Leak/Fluctuation air leak present 6/30/2019  7:10 AM   Safety all tubing connections taped;2 rubber-tipped hemostats w/ patient;all connections secured;suction checked 6/30/2019  7:10 AM   Securement  tubing secured to body distal to insertion site w/ tape 6/30/2019  7:10 AM   Patency Intervention Tip/tilt 6/30/2019  7:10 AM   Drainage Description Serosanguineous 6/30/2019  7:10 AM   Dressing Appearance occlusive gauze dressing intact 6/30/2019  7:10 AM   Dressing Care dressing changed 6/30/2019 10:00 AM   Left Subcutaneous Emphysema none present 6/30/2019  7:10 AM   Right Subcutaneous Emphysema none present 6/30/2019  7:10 AM   Site Assessment Clean;Dry;Intact;No redness;No swelling;Ecchymotic 6/30/2019 10:00 AM   Surrounding Skin Dry;Intact 6/30/2019  7:10 AM   Output (mL) 0 mL 6/30/2019 10:00 AM   Number of days: 0            Y Chest Tube 3 and 4 06/18/19 1151 3 Left Pleural 19 Fr. 4 Left Mediastinal 19 Fr. (Active)   Function -20 cm H2O 6/30/2019  7:10 AM   Air Leak/Fluctuation air leak not present 6/30/2019  7:10 AM   Safety all tubing connections taped;2 rubber-tipped hemostats w/ patient;all connections secured;suction checked 6/30/2019  7:10 AM   Securement tubing secured to body distal to insertion site w/ tape 6/30/2019  7:10 AM   Left Subcutaneous Emphysema none present 6/30/2019  7:10 AM   Right Subcutaneous Emphysema none present 6/30/2019  7:10 AM   Patency Intervention Tip/tilt 6/30/2019  7:10 AM   Drainage Description 3 Serosanguineous 6/30/2019  7:10 AM   Tube 3 Dressing Appearance occlusive gauze dressing intact 6/30/2019  7:10 AM   Tube 3 Dressing Care dressing changed 6/30/2019 10:00 AM   Site Assessment 3 Clean;Dry;Intact 6/30/2019 10:00 AM   Surrounding Skin 3 Dry;Intact 6/30/2019  7:10 AM   Drainage Description 4 Serosanguineous 6/30/2019  7:10 AM   Tube 4 Dressing Appearance occlusive gauze dressing intact 6/30/2019  7:10 AM   Tube 4 Dressing Care dressing changed 6/30/2019 10:00 AM   Site Assessment 4 Clean;Dry;Intact 6/30/2019 10:00 AM   Surrounding Skin 4 Dry;Intact 6/30/2019  7:10 AM   Output (mL) 0 mL 6/30/2019 10:00 AM   Number of days: 11       Significant Labs:  CBC:  Recent Labs    Lab 07/04/19  0718   WBC 5.72   RBC 2.40*   HGB 6.7*   HCT 21.5*   *   MCV 90   MCH 27.9   MCHC 31.2*     BMP:  Recent Labs   Lab 07/04/19  0600   *   K 4.3      CO2 19*   BUN 16   CREATININE 1.3   CALCIUM 8.0*      Tacrolimus Levels:  Recent Labs   Lab 07/04/19  0718   TACROLIMUS 6.8     Microbiology:  Microbiology Results (last 7 days)     Procedure Component Value Units Date/Time    Fungus culture [[id number]] Collected:  06/20/19 0808    Order Status:  Completed Specimen:  Respiratory from Bronchial Wash Updated:  07/03/19 1000     Fungus (Mycology) Culture Culture in progress      No fungus isolated after 2 weeks          I have reviewed all pertinent labs within the past 24 hours.    Diagnostic Results:  Chest X-Ray: Vascular catheters, right pleural drain and monitoring leads remain.  There is some high-density material in the upper abdomen likely from the modified barium swallow.  Heart is enlarged there is mild increase in the pulmonary vascular markings.  Persistent parenchymal opacification bilaterally.  No significant pneumothorax seen.  Probable right pleural effusion.

## 2019-07-04 NOTE — PROGRESS NOTES
"[facility]  Endocrinology  Progress Note    Admit Date: 6/17/2019     Reason for Consult: Management of  Hyperglycemia and CF related pancreatic insufficiency.     Surgical Procedure and Date: lung transplant in 2014; 6/18/19    HPI:   Patient is a 29 y.o. female with a diagnosis of CF, HTN, migraine headache, and osteopenia. 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 No personal history of DM. Endocrinology consulted for BG management.     Lab Results   Component Value Date    HGBA1C 4.7 01/09/2019                 Interval HPI:   Overnight events: Remains in TSU. NAEON.  BG at or slightly above goal with correction scale coverage. Prednisone 20 mg daily; steroids per primary.   Eating:   <25%; apple sauce bites throughout the day   Nausea: No  Hypoglycemia and intervention: No  Fever: No  TPN and/or TF: No    BP 96/75 (Patient Position: Lying)   Pulse 90   Temp 96.1 °F (35.6 °C) (Oral)   Resp 18   Ht 5' 1" (1.549 m)   Wt 65.7 kg (144 lb 13.5 oz)   LMP 10/02/2016   SpO2 96%   Breastfeeding? No   BMI 27.37 kg/m²      Labs Reviewed and Include    Recent Labs   Lab 07/04/19  0600   *   CALCIUM 8.0*   ALBUMIN 2.3*   PROT 5.0*   *   K 4.3   CO2 19*      BUN 16   CREATININE 1.3   ALKPHOS 170*   ALT 53*   AST 17   BILITOT 0.3     Lab Results   Component Value Date    WBC 5.72 07/04/2019    HGB 6.7 (L) 07/04/2019    HCT 21.5 (L) 07/04/2019    MCV 90 07/04/2019     (L) 07/04/2019     No results for input(s): TSH, FREET4 in the last 168 hours.  Lab Results   Component Value Date    HGBA1C 4.7 01/09/2019       Nutritional status:   Body mass index is 27.37 kg/m².  Lab Results   Component Value Date    ALBUMIN 2.3 (L) 07/04/2019    ALBUMIN 2.2 (L) 07/03/2019    ALBUMIN 2.4 (L) 07/02/2019     Lab Results   Component Value Date    PREALBUMIN 15 (L) 05/29/2014    PREALBUMIN 11 (L) 05/09/2014    PREALBUMIN 18 (L) 03/19/2014 "       Estimated Creatinine Clearance: 54.9 mL/min (based on SCr of 1.3 mg/dL).    Accu-Checks  Recent Labs     07/02/19  0748 07/02/19  1328 07/02/19  1802 07/02/19  2137 07/03/19  0258 07/03/19  0729 07/03/19  1149 07/03/19  1739 07/03/19  2117 07/04/19  0842   POCTGLUCOSE 172* 206* 249* 135* 141* 166* 128* 268* 158* 138*       Current Medications and/or Treatments Impacting Glycemic Control  Immunotherapy:    Immunosuppressants         Stop Route Frequency     tacrolimus capsule 0.5 mg      -- SL 2 times daily     mycophenolate (CELLCEPT) 500 mg in dextrose 5 % 100 mL IVPB      -- IV 2 times daily        Steroids:   Hormones (From admission, onward)    Start     Stop Route Frequency Ordered    07/02/19 0900  predniSONE tablet 20 mg      -- Oral Daily 07/01/19 1148    06/26/19 1827  dexAMETHasone sodium phos (PF) 10 mg/mL injection     Note to Pharmacy:  Created by cabinet override    06/27 0629   06/26/19 1827        Pressors:    Autonomic Drugs (From admission, onward)    None        Hyperglycemia/Diabetes Medications:   Antihyperglycemics (From admission, onward)    Start     Stop Route Frequency Ordered    07/02/19 1543  insulin aspart U-100 pen 1-10 Units      -- SubQ Before meals & nightly PRN 07/02/19 1443          ASSESSMENT and PLAN    * S/P lung transplant  Managed per LUT.   avoid hypoglycemia        Hyperglycemia  BG goal 140 - 180.     Moderate dose correction scale  BG monitoring to AC/HS    Discharge planning: TBD    Adrenal cortical steroids causing adverse effect in therapeutic use  Glucocorticoids markedly increase blood glucose. Expect the steroid taper will help glucose control.         Prophylactic immunotherapy  May increase insulin resistance.       CF related Pancreatic insufficiency  May impact BG.           Amy Zapata NP  Endocrinology  [facility]

## 2019-07-04 NOTE — PT/OT/SLP PROGRESS
Physical Therapy Treatment    Patient Name:  Juanita Ibarra   MRN:  [id number]    Recommendations:     Discharge Recommendations:  rehabilitation facility   Discharge Equipment Recommendations: walker, rolling   Barriers to discharge: Inaccessible home and Decreased caregiver support    Assessment:     Juanita Ibarra is a 29 y.o. female admitted with a medical diagnosis of S/P lung transplant.  She presents with the following impairments/functional limitations:  weakness, impaired endurance, impaired self care skills, impaired functional mobilty, gait instability, impaired balance, decreased lower extremity function, decreased upper extremity function, decreased safety awareness, pain, decreased coordination.    Pt currently requires ModA with all transfers.  Able to amb short distances, <10', with modA and use of RW for support.  Inhibited by B LE mm weakness.  Therefore established an exercise program to address mm strengthening of the abdominals and LEs, to be performed in sit.  Handout provided and pt and spouse ed on frequency.  Changes to POC to address CLOF.     Rehab Prognosis: Fair; patient would benefit from acute skilled PT services to address these deficits and reach maximum level of function.    Recent Surgery: Procedure(s) (LRB):  REDO BILATERAL LUNG TRANSPLANT; [place] (Bilateral) 16 Days Post-Op    Plan:     During this hospitalization, patient to be seen 5 x/week to address the identified rehab impairments via gait training, therapeutic activities, therapeutic exercises, neuromuscular re-education and progress toward the following goals:    · Plan of Care Expires:  07/17/19    Subjective     Chief Complaint: Abdominal pain and LE weakness  Patient/Family Comments/goals: to rest  Pain/Comfort:  · Pain Rating 1: 9/10  · Location 1: abdomen  · Pain Addressed 1: Pre-medicate for activity, Nurse notified, Distraction, Cessation of Activity      Objective:     Communicated with nursing  prior to session.  Patient found up in chair with chest tube, central line upon PT entry to room.     General Precautions: Standard, fall   Orthopedic Precautions:N/A   Braces: N/A     Functional Mobility:  · Transfers:     · Sit to Stand:  ModA from low chair: 4xs; ModA from toilet: 3xs with ed on hand placement, use of chair armrests and grab bar in bathroom  · Bed to Chair: moderate assistance with  rolling walker  using  Stand Pivot  · Toilet Transfer: moderate assistance with  grab bars  using  Stand Pivot  · Gait: Pt amb 6', 2', 2', with prolonged sitting rest breaks bn trials, chair follow, B drag LEs to advance R>L  · Balance: ModA: dynamic standing balance with AD      AM-PAC 6 CLICK MOBILITY  Turning over in bed (including adjusting bedclothes, sheets and blankets)?: 3  Sitting down on and standing up from a chair with arms (e.g., wheelchair, bedside commode, etc.): 2  Moving from lying on back to sitting on the side of the bed?: 2  Moving to and from a bed to a chair (including a wheelchair)?: 2  Need to walk in hospital room?: 2  Climbing 3-5 steps with a railing?: 1  Basic Mobility Total Score: 12       Therapeutic Activities and Exercises:   Whiteboard updated  INC TIME TO PERF EX  Ankle pumps: 20 reps, in sit   LAQs: 20 reps each LE perf individually, in sit  Hip abd/add: 20 reps each LE perf individually, in sit  Hip flex: 20 reps each LE perf individually, in sit  Sit-ups: 20 reps, in reclined sitting posture  Bladder and bowel management: MaxA for hygiene    Patient left up in chair with all lines intact, call button in reach and spouse present.    GOALS:   Multidisciplinary Problems     Physical Therapy Goals        Problem: Physical Therapy Goal    Goal Priority Disciplines Outcome Goal Variances Interventions   Physical Therapy Goal     PT, PT/OT Ongoing (interventions implemented as appropriate)     Description:  Goals to be met by: 7/19/19    Patient will increase functional independence with  mobility by performin. Supine to sit with Stand-by Assistance.  2. Sit to stand transfer with Tonio.  3. Pt to perform stand pivot transfer with Tonio, with use of RW.  4. Gait  x 20 feet with Tonio, with use of RW.  5.  Pt to perform and be compliant with exercise program in sit, to reduce complication s/p surgery.                     Time Tracking:     PT Received On: 19  PT Start Time: 1548     PT Stop Time: 1641  PT Total Time (min): 53 min     Billable Minutes: Gait Training 24, Therapeutic Activity 10 and Therapeutic Exercise 19    Treatment Type: Treatment  PT/PTA: PT     PTA Visit Number: 1     Alicia Jones, PT  2019

## 2019-07-04 NOTE — ASSESSMENT & PLAN NOTE
BG goal 140 - 180.     increase novolog 4-6 units with meals.   Start novolog 3 units snack dose for evenings.   Moderate dose correction scale  BG monitoring to AC/HS    Discharge planning: [facility]

## 2019-07-04 NOTE — PLAN OF CARE
Problem: Physical Therapy Goal  Goal: Physical Therapy Goal  Goals to be met by: 19    Patient will increase functional independence with mobility by performin. Supine to sit with Stand-by Assistance.  2. Sit to stand transfer with Tonio.  3. Pt to perform stand pivot transfer with Tonio, with use of RW.  4. Gait  x 20 feet with Tonio, with use of RW.  5.  Pt to perform and be compliant with exercise program in sit, to reduce complication s/p surgery.   Outcome: Ongoing (interventions implemented as appropriate)  Pt progressing towards goals, some goals updated to reflect CLOF.

## 2019-07-04 NOTE — PROGRESS NOTES
[facility]  Lung Transplant  Progress Note - Floor    Patient Name: Juanita Ibarra  MRN: [id number]  Admission Date: 6/17/2019  Hospital Length of Stay: 16 days  Post-Operative Day: 1848 (Lung), 16 (Lung)  Attending Physician: Francisca Morin DO  Primary Care Provider: Primary Doctor No     Subjective:     Interval History: No acute events overnight. Pain well controlled. Tolerating diet. OR bronchoscopy tomorrow. Cough improving and starting to clear secretions.     Continuous Infusions:    Scheduled Meds:   acetylcysteine 200 mg/ml (20%)  4 mL Nebulization Q12H    ciprofloxacin  400 mg Intravenous Q8H    enoxaparin  40 mg Subcutaneous Daily    famotidine  20 mg Oral BID    fluconazole  400 mg Oral Daily    gabapentin  300 mg Oral TID    ganciclovir (CYTOVENE) IVPB  5 mg/kg Intravenous Q12H    levalbuterol  1.25 mg Nebulization Q6H WAKE    lidocaine  1 patch Transdermal Q24H    lidocaine  1 patch Transdermal Q24H    lipase-protease-amylase 24,000-76,000-120,000 units  4 capsule Oral TID WM    metoprolol tartrate  25 mg Oral TID    morphine  15 mg Oral Q6H    mycophenolate (CELLCEPT) IVPB  500 mg Intravenous BID    polyethylene glycol  17 g Per NG tube BID    predniSONE  20 mg Oral Daily    QUEtiapine  25 mg Oral Daily    QUEtiapine  50 mg Oral QHS    sodium chloride 7%  4 mL Nebulization Q12H    tacrolimus  0.5 mg Sublingual BID     PRN Meds:bisacodyl, Dextrose 10% Bolus, Dextrose 10% Bolus, diphenhydrAMINE, glucagon (human recombinant), glucose, glucose, HYDROmorphone, insulin aspart U-100, lactulose, lipase-protease-amylase 24,000-76,000-120,000 units, lorazepam, magnesium sulfate IVPB, metoclopramide HCl, metoprolol, naloxone, OLANZapine, ondansetron, ondansetron, potassium chloride in water **AND** potassium chloride in water **AND** potassium chloride in water, promethazine (PHENERGAN) IVPB    Review of patient's allergies indicates:   Allergen Reactions     Albuterol Palpitations    Colistin Anaphylaxis    Vancomycin analogues      Infusion reaction that does not resolve with slowing  Pt. States she can tolerate it when given with 50 mg Benadryl and ran over 3 hours    Neupogen [filgrastim] Other (See Comments)     Ostealgia after five daily doses of 300 mcg.      Bactrim [sulfamethoxazole-trimethoprim] Hives    Ceftazidime Hives     Pt stated can tolerate cefapine not ceftazidime    Ceftazidime     Dronabinol Other (See Comments)     Mental changes/hallucinations    Haldol [haloperidol lactate] Other (See Comments)     Seizure like activity    Nsaids (non-steroidal anti-inflammatory drug)      Cannot have due to lung transplant    Adhesive Rash     Cloth tape- please use tegaderm or paper tape    Aztreonam Rash    Ciprofloxacin Nausea And Vomiting     Projectile N/V, per patient.  Unwilling to retry therapy.       Review of Systems   Constitutional: Negative for appetite change, chills, diaphoresis and fever.   HENT: Negative for congestion, postnasal drip, rhinorrhea, sinus pressure and sinus pain.    Respiratory: Positive for cough and shortness of breath. Negative for wheezing.    Cardiovascular: Positive for chest pain (insertion site). Negative for palpitations and leg swelling.   Gastrointestinal: Negative for abdominal distention, diarrhea, nausea and vomiting.   Genitourinary: Negative for decreased urine volume, difficulty urinating, dysuria and urgency.   Musculoskeletal: Positive for back pain (chronic).   Skin: Negative for rash and wound.   Allergic/Immunologic: Positive for immunocompromised state.   Neurological: Positive for weakness. Negative for dizziness, syncope and headaches.   Psychiatric/Behavioral: Negative for confusion. The patient is nervous/anxious.      Objective:   Physical Exam   Constitutional: She appears well-developed and well-nourished. She is easily aroused. No distress.   HENT:   Head: Normocephalic and atraumatic.    Nose: Nose normal.   Eyes: Conjunctivae and EOM are normal. No scleral icterus.   Neck: No JVD present.   Right IJ CVC, CDI   Cardiovascular: Regular rhythm. Tachycardia present. Exam reveals no friction rub.   No murmur heard.  Pulmonary/Chest: No accessory muscle usage. No respiratory distress. She has decreased breath sounds in the right lower field and the left lower field. She has no wheezes. She has rhonchi in the right upper field and the left upper field. She has no rales.   Bilateral chest tubes to suction, no air leak   Abdominal: Soft. Bowel sounds are normal. She exhibits no distension. There is no tenderness.   Musculoskeletal: She exhibits no edema.   Neurological: She is alert and easily aroused. No cranial nerve deficit.   Skin: Skin is warm and dry. No rash noted. She is not diaphoretic. No erythema.   Psychiatric: Her mood appears anxious.   Nursing note and vitals reviewed.        Vital Signs (Most Recent):  Temp: 96.1 °F (35.6 °C) (07/04/19 0735)  Pulse: 90 (07/04/19 0827)  Resp: 18 (07/04/19 0827)  BP: 96/75 (07/04/19 0735)  SpO2: 96 % (07/04/19 0827) Vital Signs (24h Range):  Temp:  [96.1 °F (35.6 °C)-98.8 °F (37.1 °C)] 96.1 °F (35.6 °C)  Pulse:  [] 90  Resp:  [16-18] 18  SpO2:  [93 %-99 %] 96 %  BP: ()/(60-83) 96/75     Weight: 65.7 kg (144 lb 13.5 oz)  Body mass index is 27.37 kg/m².      Intake/Output Summary (Last 24 hours) at 7/4/2019 1058  Last data filed at 7/3/2019 2319  Gross per 24 hour   Intake 850 ml   Output 500 ml   Net 350 ml       Ventilator Data:          Hemodynamic Parameters:       Lines/Drains:       Introducer right internal jugular (Active)   Specific Qualities Capped 6/30/2019  7:10 AM   Dressing Status Clean;Dry;Biopatch in place;Intact 6/30/2019  7:10 AM   Dressing Intervention Dressing reinforced 6/29/2019  3:00 AM   Dressing Change Due 06/29/19 6/30/2019  3:00 AM   Daily Line Review Performed 6/30/2019  7:10 AM   Number of days:             Port A Cath  Single Lumen 06/24/14 1200 right subclavian (Active)   Accessed by: Radha Lanza 6/24/2019  5:00 AM   Dressing Type Transparent 6/30/2019  7:10 AM   Dressing Status Biopatch in place;Clean;Dry;Intact 6/30/2019  7:10 AM   Dressing Intervention Dressing reinforced 6/29/2019  3:00 AM   Dressing Change Due 06/30/19 6/30/2019  3:00 AM   Line Status Infusing 6/30/2019  7:10 AM   Flush Performed Yes 6/30/2019  7:10 AM   Date to be Reflushed 06/18/19 6/29/2019  3:00 AM   Daily Line Review Performed 6/30/2019  7:10 AM   Type of Needle Nicole 6/30/2019  3:00 AM   Gauge 20 6/30/2019  3:00 AM   Needle Length 1 in 6/30/2019  3:00 AM   Needle Status Left in place 6/30/2019  3:00 AM   Needle Insertion Date 06/24/19 6/30/2019  3:00 AM   Needle Insertion Time 0500 6/26/2019  3:00 AM   Number of days: 1831            Percutaneous Central Line Insertion/Assessment - double lumen  06/18/19 0510 (Active)   Dressing biopatch in place;dressing dry and intact 6/30/2019  7:10 AM   Securement secured w/ sutures 6/30/2019  7:10 AM   Additional Site Signs no erythema;no warmth 6/30/2019  7:10 AM   Distal Patency/Care infusing 6/30/2019  7:10 AM   Proximal Patency/Care infusing 6/30/2019  7:10 AM   Waveform normal 6/30/2019  7:10 AM   Line Interventions line leveled/zeroed 6/30/2019  7:10 AM   Dressing Change Due 06/29/19 6/30/2019  3:00 AM   Daily Line Review Performed 6/30/2019  7:10 AM   Number of days: 12            Peripheral IV - Single Lumen 06/27/19 0917 22 G Right Forearm (Active)   Site Assessment Clean;Dry;Intact;No redness;No swelling 6/30/2019  7:10 AM   Line Status Infusing 6/30/2019  7:10 AM   Dressing Status Clean;Dry;Intact 6/30/2019  7:10 AM   Dressing Intervention Dressing reinforced 6/29/2019  3:00 AM   Dressing Change Due 07/01/19 6/30/2019  3:00 AM   Site Change Due 07/01/19 6/29/2019  7:00 PM   Reason Not Rotated Not due 6/30/2019  7:10 AM   Number of days: 3            Chest Tube 06/29/19 1655 1 Right Fourth intercostal  space;Midaxillary 7 Fr. (Active)   Chest Tube WDL WDL 6/30/2019  7:10 AM   Function -20 cm H2O 6/30/2019  7:10 AM   Air Leak/Fluctuation air leak present 6/30/2019  7:10 AM   Safety all tubing connections taped;2 rubber-tipped hemostats w/ patient;all connections secured;suction checked 6/30/2019  7:10 AM   Securement tubing secured to body distal to insertion site w/ tape 6/30/2019  7:10 AM   Patency Intervention Tip/tilt 6/30/2019  7:10 AM   Drainage Description Serosanguineous 6/30/2019  7:10 AM   Dressing Appearance occlusive gauze dressing intact 6/30/2019  7:10 AM   Dressing Care dressing changed 6/30/2019 10:00 AM   Left Subcutaneous Emphysema none present 6/30/2019  7:10 AM   Right Subcutaneous Emphysema none present 6/30/2019  7:10 AM   Site Assessment Clean;Dry;Intact;No redness;No swelling;Ecchymotic 6/30/2019 10:00 AM   Surrounding Skin Dry;Intact 6/30/2019  7:10 AM   Output (mL) 0 mL 6/30/2019 10:00 AM   Number of days: 0            Y Chest Tube 3 and 4 06/18/19 1151 3 Left Pleural 19 Fr. 4 Left Mediastinal 19 Fr. (Active)   Function -20 cm H2O 6/30/2019  7:10 AM   Air Leak/Fluctuation air leak not present 6/30/2019  7:10 AM   Safety all tubing connections taped;2 rubber-tipped hemostats w/ patient;all connections secured;suction checked 6/30/2019  7:10 AM   Securement tubing secured to body distal to insertion site w/ tape 6/30/2019  7:10 AM   Left Subcutaneous Emphysema none present 6/30/2019  7:10 AM   Right Subcutaneous Emphysema none present 6/30/2019  7:10 AM   Patency Intervention Tip/tilt 6/30/2019  7:10 AM   Drainage Description 3 Serosanguineous 6/30/2019  7:10 AM   Tube 3 Dressing Appearance occlusive gauze dressing intact 6/30/2019  7:10 AM   Tube 3 Dressing Care dressing changed 6/30/2019 10:00 AM   Site Assessment 3 Clean;Dry;Intact 6/30/2019 10:00 AM   Surrounding Skin 3 Dry;Intact 6/30/2019  7:10 AM   Drainage Description 4 Serosanguineous 6/30/2019  7:10 AM   Tube 4 Dressing Appearance  occlusive gauze dressing intact 6/30/2019  7:10 AM   Tube 4 Dressing Care dressing changed 6/30/2019 10:00 AM   Site Assessment 4 Clean;Dry;Intact 6/30/2019 10:00 AM   Surrounding Skin 4 Dry;Intact 6/30/2019  7:10 AM   Output (mL) 0 mL 6/30/2019 10:00 AM   Number of days: 11       Significant Labs:  CBC:  Recent Labs   Lab 07/04/19  0718   WBC 5.72   RBC 2.40*   HGB 6.7*   HCT 21.5*   *   MCV 90   MCH 27.9   MCHC 31.2*     BMP:  Recent Labs   Lab 07/04/19  0600   *   K 4.3      CO2 19*   BUN 16   CREATININE 1.3   CALCIUM 8.0*      Tacrolimus Levels:  Recent Labs   Lab 07/04/19  0718   TACROLIMUS 6.8     Microbiology:  Microbiology Results (last 7 days)     Procedure Component Value Units Date/Time    Fungus culture [[id number]] Collected:  06/20/19 0808    Order Status:  Completed Specimen:  Respiratory from Bronchial Wash Updated:  07/03/19 1000     Fungus (Mycology) Culture Culture in progress      No fungus isolated after 2 weeks          I have reviewed all pertinent labs within the past 24 hours.    Diagnostic Results:  Chest X-Ray: Vascular catheters, right pleural drain and monitoring leads remain.  There is some high-density material in the upper abdomen likely from the modified barium swallow.  Heart is enlarged there is mild increase in the pulmonary vascular markings.  Persistent parenchymal opacification bilaterally.  No significant pneumothorax seen.  Probable right pleural effusion.           Assessment/Plan:     * S/P lung transplant  POD#16 s/p bilateral lung transplant (re-transplant) for CARLOS EDUARDO. 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 PGD 0. S/p bronch and extubated to HFNC on POD#2, but was re-intubated 6/21 for hypercapnic respiratory failure. Right pigtail placed for PTX on 6/29. Will discuss removing remaining chest tube with CTS tomorrow.     Continue PT/OT/IS/CPT. Continue immunosuppression and prophylaxis. OR bronchoscopy scheduled for 7/5 at 10:30.      Immunosuppression  Previously on tacrolimus, MMF, and daily prednisone. S/p solumedrol in OR, basiliximab on POD#0 and POD#4. Continue MMF, tacrolimus, and steroid taper. Monitor daily tacrolimus levels and adjust dose as needed.       Prophylactic antibiotic  CMV D-/R+. Continue OIP with ganciclovir, fluconazole, and dapsone. Bronch wash 6/20 and 6/23 with  Pseudomonas and Candida Glabrata. Vancomycin discontinued. Continue ciprofloxacin 400 mg Q8hrs.    Acute blood loss anemia  Received 6u PRBCs, 2u plts, 1 cryo, and 4 FFP angie-operatively. Will be conservative with future transfusions.    Adrenal cortical steroids causing adverse effect in therapeutic use  Endocrinology consulted, appreciate recs.    Chronic pain with opiate use  Anesthesia consulted for pain mgmt. Continue aggressive bowel regimen. VBG PRN to watch for development of hypercapnia. Pain management following, appreciate recs.     CF related Pancreatic insufficiency  Continue creon with meals and snacks.     Steroid-induced hyperglycemia  Endocrine following, appreciate recs.     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 Continue Ciprofloxacin, Tobramycin x1 on 6/22.         Shama Canales PA-C  Lung Transplant  [facility]

## 2019-07-04 NOTE — ASSESSMENT & PLAN NOTE
POD#16 s/p bilateral lung transplant (re-transplant) for CARLOS EDUARDO. 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 PGD 0. S/p bronch and extubated to HFNC on POD#2, but was re-intubated 6/21 for hypercapnic respiratory failure. Right pigtail placed for PTX on 6/29. Will discuss removing remaining chest tube with CTS tomorrow.     Continue PT/OT/IS/CPT. Continue immunosuppression and prophylaxis. OR bronchoscopy scheduled for 7/5 at 10:30.

## 2019-07-05 PROBLEM — Z94.2 LUNG TRANSPLANT STATUS, BILATERAL: Status: ACTIVE | Noted: 2019-01-01

## 2019-07-05 NOTE — PROGRESS NOTES
Provided the patient and her significant other Shawn with post-lung transplant educational material.  Instructed them to review the information in preparation for teaching sessions with transplant team members that will be conducted prior to discharge.  Both denied having any questions and verbalized their agreement to read the transplant information.

## 2019-07-05 NOTE — PT/OT/SLP PROGRESS
Physical Therapy      Patient Name:  Juanita Ibarra   MRN:  [id number]    Patient not seen today 2/2 pt refusal. Pt pre-medicated for pain prior to therapy attempt this AM per RN. However, upon PT arrival, pt refusing therapy 2/2 wanting to rest. Discussed PT POC and importance of participation in therapy up to tolerance. Pt verbalized understanding, but stated that she did not want to participate in therapy until after bronchoscopy scheduled for this PM. Therapist assisted pt with scooting towards HOB for comfort/repositioning with total A. Will follow up at next scheduled visit.     Kari Suarez, PT, DPT   7/5/2019

## 2019-07-05 NOTE — PT/OT/SLP PROGRESS
Speech Language Pathology Treatment    Patient Name:  Juanita Ibarra   MRN:  [id number]  Admitting Diagnosis: S/P lung transplant    Recommendations:                 General Recommendations:  Dysphagia therapy  Diet recommendations:  Dental Soft, Liquid Diet Level: Nectar Thick   Aspiration Precautions: Assistance with meals, Feed only when awake/alert, Meds whole 1 at a time and Standard aspiration precautions   General Precautions: Standard, fall  Communication strategies:  none    Subjective     Patient asleep. Souse present.  Patient NPO for bronch.     Pain/Comfort:  · Pain Rating 1: 0/10    Objective:     Has the patient been evaluated by SLP for swallowing?   Yes  Keep patient NPO? No   Current Respiratory Status: room air      ST provided skilled education to spouse re: nectar thickened liquid recipe, results of MBSS showing naina aspiration of thin liquids, ongoing ST plan of care. ST provided handout of dysphagia exercises to spouse. No trials provided this service date 2/2 patient NPO for bronch. Spouse verbalized understanding to patient diet recs and education provided.  Cont ST POC    Assessment:     Juanita Ibarra is a 29 y.o. female with an SLP diagnosis of Dysphagia.     Goals:   Multidisciplinary Problems     SLP Goals        Problem: SLP Goal    Goal Priority Disciplines Outcome   SLP Goal     SLP    Description:  Speech Language Pathology Goals  Goals expected to be met by 7/4:  1. Patient will participate in ongoing swallow assessment.   2. When determined by SLP, patient may benefit from a repeat modified barium swallow study to further objectively assess swallow function/safety.                         Plan:     · Patient to be seen:  4 x/week   · Plan of Care expires:  07/27/19  · Plan of Care reviewed with:  patient   · SLP Follow-Up:  Yes       Discharge recommendations:  rehabilitation facility   Barriers to Discharge:  None    Time Tracking:     SLP Treatment Date:    07/05/19  Speech Start Time:  1030  Speech Stop Time:  1038     Speech Total Time (min):  8 min    Billable Minutes: Seld Care/Home Management Training 8    Emily Abadie, CCC-SLP  07/05/2019

## 2019-07-05 NOTE — PLAN OF CARE
Pt AAOx4, VSS.  Refusing VISI monitor. TELE monitoring in progress, ST HR low 100s.  Pt on RA w/ O2 sats >93%.  Incisional pain and pain to chest tube site today.  Controlled moderately with IV dilaudid q4h and scheduled morphine.  IV ativan given PRN.  Zofran given q6h PRN.  Bronch done today in surgery. Cultures obtained and sent for analysis.  Pt noted to be breathing better following bronch.   Chest tube also removed while in surgery.  No issues noted.  Dressing to site CDI.  Appetite poor.  PO intake encouraged.  Pt still on thickened liquids.  Complaints of difficulty swallowing and talking noted.  PT/OT ordered.  Pt not seen today secondary to bronch and pain.  Up in chair for about an hour prior to bronch. Activity encouraged.  Self meds reviewed.  Pt to start process this evening.  Teaching still needed to be done for spouse.  Fall precautions in place. Call bell in reach at all times. Non-skid socks on feet when out of bed.  Bed lowered and locked in place.   Contact precautions maintained for .  Infection control and hand hygiene practices reviewed with pt.  No acute events/falls/injuries. See flowsheet for further assessment findings.

## 2019-07-05 NOTE — ASSESSMENT & PLAN NOTE
Pseudomonas from 6/20 and 6/23 bronchial wash. ReceivedTobramycin x1 on 6/22. Continue ciprofloxacin. Plan for repeat bronchial wash cultures to be collected this afternoon.

## 2019-07-05 NOTE — CARE UPDATE
BG goal 140 - 180. BG slightly above goal with correction scale coverage. PO intake improving. Prandial excursions noted.     Start novolog 3-5 units with meals.   Moderate dose correction scale  BG monitoring to AC/HS      ** Please call Endocrine for any BG related issues **

## 2019-07-05 NOTE — SUBJECTIVE & OBJECTIVE
"Interval HPI:   Overnight events: Remains in TSU. NAEON. BG at or above goal with scheduled insulin and correction scale coverage. PO intake improving. Prednisone 20 mg daily; steroid taper per primary.   Eatin%   Nausea: No  Hypoglycemia and intervention: No  Fever: No  TPN and/or TF: No      /73 (BP Location: Right arm, Patient Position: Lying)   Pulse 100   Temp 97.7 °F (36.5 °C) (Oral)   Resp 14   Ht 5' 1" (1.549 m)   Wt 65.7 kg (144 lb 13.5 oz)   LMP 10/02/2016   SpO2 (!) 91%   Breastfeeding? No   BMI 27.37 kg/m²     Labs Reviewed and Include    Recent Labs   Lab 19  0615   *   CALCIUM 7.8*   ALBUMIN 2.2*   PROT 5.0*   *   K 5.0   CO2 19*      BUN 12   CREATININE 1.2   ALKPHOS 146*   ALT 38   AST 20   BILITOT 0.7     Lab Results   Component Value Date    WBC 6.03 2019    HGB 8.5 (L) 2019    HCT 27.7 (L) 2019    MCV 94 2019     (L) 2019     No results for input(s): TSH, FREET4 in the last 168 hours.  Lab Results   Component Value Date    HGBA1C 4.7 2019       Nutritional status:   Body mass index is 27.37 kg/m².  Lab Results   Component Value Date    ALBUMIN 2.2 (L) 2019    ALBUMIN 2.2 (L) 2019    ALBUMIN 2.3 (L) 2019     Lab Results   Component Value Date    PREALBUMIN 15 (L) 2014    PREALBUMIN 11 (L) 2014    PREALBUMIN 18 (L) 2014       Estimated Creatinine Clearance: 59.5 mL/min (based on SCr of 1.2 mg/dL).    Accu-Checks  Recent Labs     19  1739 19  2117 19  0842 19  1241 19  1809 19  0138 19  0902 19  1316 19  1459 19  1751   POCTGLUCOSE 268* 158* 138* 267* 246* 218* 107 141* 211* 123*       Current Medications and/or Treatments Impacting Glycemic Control  Immunotherapy:    Immunosuppressants         Stop Route Frequency     tacrolimus capsule 0.5 mg      -- SL 2 times daily     mycophenolate (CELLCEPT) 500 mg in dextrose 5 " % 100 mL IVPB      -- IV 2 times daily        Steroids:   Hormones (From admission, onward)    Start     Stop Route Frequency Ordered    07/02/19 0900  predniSONE tablet 20 mg      -- Oral Daily 07/01/19 1148    06/26/19 1827  dexAMETHasone sodium phos (PF) 10 mg/mL injection     Note to Pharmacy:  Created by cabinet override    06/27 0629 06/26/19 1827        Pressors:    Autonomic Drugs (From admission, onward)    None        Hyperglycemia/Diabetes Medications:   Antihyperglycemics (From admission, onward)    Start     Stop Route Frequency Ordered    07/06/19 0815  insulin aspart U-100 pen 4-6 Units      -- SubQ 3 times daily with meals 07/06/19 0804    07/02/19 1543  insulin aspart U-100 pen 1-10 Units      -- SubQ Before meals & nightly PRN 07/02/19 1443

## 2019-07-05 NOTE — TRANSFER OF CARE
"Anesthesia Transfer of Care Note    Patient: Juanita Ibarra    Procedure(s) Performed: Procedure(s) (LRB):  Bronchoscopy (N/A)    Patient location: PACU    Anesthesia Type: general    Transport from OR: Transported from OR on 6-10 L/min O2 by face mask with adequate spontaneous ventilation    Post pain: adequate analgesia    Post assessment: no apparent anesthetic complications and tolerated procedure well    Post vital signs: stable    Level of consciousness: sedated    Nausea/Vomiting: no nausea/vomiting    Complications: none    Transfer of care protocol was followed      Last vitals:   Visit Vitals  /61   Pulse 102   Temp 36 °C (96.8 °F) (Temporal)   Resp 10   Ht 5' 1" (1.549 m)   Wt 65.7 kg (144 lb 13.5 oz)   LMP 10/02/2016   SpO2 100%   Breastfeeding? No   BMI 27.37 kg/m²     "

## 2019-07-05 NOTE — PT/OT/SLP PROGRESS
Occupational Therapy      Patient Name:  Juanita Ibarra   MRN:  [id number]    Occupational therapy visit attempted in PM however pt off floor for bronch. Will follow-up as scheduled.    Carolina Phillips, OT  7/5/2019

## 2019-07-05 NOTE — ANESTHESIA POSTPROCEDURE EVALUATION
Anesthesia Post Evaluation    Patient: Juanita Ibarra    Procedure(s) Performed: Procedure(s) (LRB):  Bronchoscopy (N/A)    Final Anesthesia Type: general  Patient location during evaluation: PACU  Patient participation: Yes- Able to Participate  Level of consciousness: awake and alert  Post-procedure vital signs: reviewed and stable  Pain management: adequate  Airway patency: patent  PONV status at discharge: No PONV  Anesthetic complications: no      Cardiovascular status: hemodynamically stable  Respiratory status: unassisted  Follow-up not needed.          Vitals Value Taken Time   /74 7/5/2019  4:16 PM   Temp 36.6 °C (97.8 °F) 7/5/2019  4:00 PM   Pulse 112 7/5/2019  4:27 PM   Resp 21 7/5/2019  4:27 PM   SpO2 96 % 7/5/2019  4:27 PM   Vitals shown include unvalidated device data.      Event Time     Out of Recovery 07/05/2019 16:37:12          Pain/Richard Score: Pain Rating Prior to Med Admin: 8 (7/5/2019  1:00 PM)  Pain Rating Post Med Admin: 7 (7/5/2019  9:25 AM)  Richard Score: 9 (7/5/2019  4:21 PM)

## 2019-07-05 NOTE — NURSING TRANSFER
Nursing Transfer Note      7/5/2019     Transfer To: [id number]     Transfer via stretcher    Transfer with cardiac monitoring via centralized telemetry, 1L NC     Transported by pct    Medicines sent: insulin pen    Chart send with patient: Yes    Notified: pt refused     Patient reassessed at: 7/5/2019 1600

## 2019-07-05 NOTE — PROGRESS NOTES
[facility]  Lung Transplant  Progress Note - Floor    Patient Name: Juanita Ibarra  MRN: [id number]  Admission Date: 6/17/2019  Hospital Length of Stay: 17 days  Post-Operative Day: 1849 (Lung), 17 (Lung)  Attending Physician: Francisca Morin DO  Primary Care Provider: Primary Doctor No     Subjective:     Interval History: No acute events overnight. Reports generalized pain this morning secondary to working with PT yesterday. Having BMs and tolerating diet. Continues to report difficulty clearing secretions. Refusing Mucomyst nebulizer treatments. Continue to encouraged all scheduled nebs, CPT, and IS use. Plan for 1u PRBC transfusion today and bronchoscopy this afternoon.     Continuous Infusions:    Scheduled Meds:   acetylcysteine 200 mg/ml (20%)  4 mL Nebulization Q12H    ciprofloxacin  400 mg Intravenous Q8H    enoxaparin  40 mg Subcutaneous Daily    famotidine  20 mg Oral BID    fluconazole  400 mg Oral Daily    gabapentin  300 mg Oral TID    ganciclovir (CYTOVENE) IVPB  5 mg/kg Intravenous Q12H    insulin aspart U-100  3-5 Units Subcutaneous TIDWM    levalbuterol  1.25 mg Nebulization Q6H WAKE    lidocaine  1 patch Transdermal Q24H    lidocaine  1 patch Transdermal Q24H    lipase-protease-amylase 24,000-76,000-120,000 units  4 capsule Oral TID WM    metoprolol tartrate  25 mg Oral TID    morphine  15 mg Oral Q6H    mycophenolate (CELLCEPT) IVPB  500 mg Intravenous BID    polyethylene glycol  17 g Per NG tube BID    predniSONE  20 mg Oral Daily    QUEtiapine  25 mg Oral Daily    QUEtiapine  50 mg Oral QHS    sodium chloride 7%  4 mL Nebulization Q12H    tacrolimus  0.5 mg Sublingual BID     PRN Meds:sodium chloride, bisacodyl, Dextrose 10% Bolus, Dextrose 10% Bolus, diphenhydrAMINE, glucagon (human recombinant), glucose, glucose, HYDROmorphone, insulin aspart U-100, lactulose, lipase-protease-amylase 24,000-76,000-120,000 units, lorazepam, magnesium  sulfate IVPB, metoclopramide HCl, metoprolol, naloxone, OLANZapine, ondansetron, ondansetron, potassium chloride in water **AND** potassium chloride in water **AND** potassium chloride in water, promethazine (PHENERGAN) IVPB    Review of patient's allergies indicates:   Allergen Reactions    Albuterol Palpitations    Colistin Anaphylaxis    Vancomycin analogues      Infusion reaction that does not resolve with slowing  Pt. States she can tolerate it when given with 50 mg Benadryl and ran over 3 hours    Neupogen [filgrastim] Other (See Comments)     Ostealgia after five daily doses of 300 mcg.      Bactrim [sulfamethoxazole-trimethoprim] Hives    Ceftazidime Hives     Pt stated can tolerate cefapine not ceftazidime    Ceftazidime     Dronabinol Other (See Comments)     Mental changes/hallucinations    Haldol [haloperidol lactate] Other (See Comments)     Seizure like activity    Nsaids (non-steroidal anti-inflammatory drug)      Cannot have due to lung transplant    Adhesive Rash     Cloth tape- please use tegaderm or paper tape    Aztreonam Rash    Ciprofloxacin Nausea And Vomiting     Projectile N/V, per patient.  Unwilling to retry therapy.       Review of Systems   Constitutional: Negative for appetite change, chills, diaphoresis and fever.   HENT: Negative for congestion, postnasal drip, rhinorrhea, sinus pressure and sinus pain.    Respiratory: Positive for cough and shortness of breath. Negative for wheezing.    Cardiovascular: Positive for chest pain (insertion site). Negative for palpitations and leg swelling.   Gastrointestinal: Negative for abdominal distention, diarrhea, nausea and vomiting.   Genitourinary: Negative for decreased urine volume, difficulty urinating, dysuria and urgency.   Musculoskeletal: Positive for back pain (chronic).   Skin: Negative for rash and wound.   Allergic/Immunologic: Positive for immunocompromised state.   Neurological: Positive for weakness. Negative for  dizziness, syncope and headaches.   Psychiatric/Behavioral: Negative for confusion. The patient is nervous/anxious.      Objective:   Physical Exam   Constitutional: She is oriented to person, place, and time. She appears well-developed and well-nourished. She is easily aroused. No distress.   HENT:   Head: Normocephalic and atraumatic.   Right Ear: External ear normal.   Left Ear: External ear normal.   Nose: Nose normal.   Eyes: Conjunctivae and EOM are normal. No scleral icterus.   Neck: Normal range of motion. Neck supple. No JVD present.   Cardiovascular: Regular rhythm. Tachycardia present. Exam reveals no friction rub.   No murmur heard.  Pulmonary/Chest: No accessory muscle usage. No respiratory distress. She has decreased breath sounds in the right lower field and the left lower field. She has no wheezes. She has rhonchi in the right upper field and the left upper field. She has no rales.   Right chest tube to suction, no air leak   Abdominal: Soft. Bowel sounds are normal. She exhibits no distension. There is no tenderness.   Musculoskeletal: She exhibits no edema.   Neurological: She is alert, oriented to person, place, and time and easily aroused. No cranial nerve deficit.   Skin: Skin is warm and dry. No rash noted. She is not diaphoretic. No erythema.   Psychiatric: Her mood appears anxious.   Nursing note and vitals reviewed.        Vital Signs (Most Recent):  Temp: 98.7 °F (37.1 °C) (07/05/19 1108)  Pulse: 110 (07/05/19 1140)  Resp: 12 (07/05/19 1108)  BP: 99/62 (07/05/19 1108)  SpO2: (!) 91 % (07/05/19 1108) Vital Signs (24h Range):  Temp:  [97.6 °F (36.4 °C)-99.1 °F (37.3 °C)] 98.7 °F (37.1 °C)  Pulse:  [] 110  Resp:  [12-20] 12  SpO2:  [85 %-100 %] 91 %  BP: ()/(62-87) 99/62     Weight: 65.7 kg (144 lb 13.5 oz)  Body mass index is 27.37 kg/m².      Intake/Output Summary (Last 24 hours) at 7/5/2019 1255  Last data filed at 7/5/2019 0900  Gross per 24 hour   Intake 900 ml   Output 1200  ml   Net -300 ml       Ventilator Data:     Oxygen Concentration (%):  [94] 94    Hemodynamic Parameters:       Lines/Drains:       Introducer right internal jugular (Active)   Specific Qualities Capped 6/30/2019  7:10 AM   Dressing Status Clean;Dry;Biopatch in place;Intact 6/30/2019  7:10 AM   Dressing Intervention Dressing reinforced 6/29/2019  3:00 AM   Dressing Change Due 06/29/19 6/30/2019  3:00 AM   Daily Line Review Performed 6/30/2019  7:10 AM   Number of days:             Port A Cath Single Lumen 06/24/14 1200 right subclavian (Active)   Accessed by: Radha Lanza 6/24/2019  5:00 AM   Dressing Type Transparent 6/30/2019  7:10 AM   Dressing Status Biopatch in place;Clean;Dry;Intact 6/30/2019  7:10 AM   Dressing Intervention Dressing reinforced 6/29/2019  3:00 AM   Dressing Change Due 06/30/19 6/30/2019  3:00 AM   Line Status Infusing 6/30/2019  7:10 AM   Flush Performed Yes 6/30/2019  7:10 AM   Date to be Reflushed 06/18/19 6/29/2019  3:00 AM   Daily Line Review Performed 6/30/2019  7:10 AM   Type of Needle Nicole 6/30/2019  3:00 AM   Gauge 20 6/30/2019  3:00 AM   Needle Length 1 in 6/30/2019  3:00 AM   Needle Status Left in place 6/30/2019  3:00 AM   Needle Insertion Date 06/24/19 6/30/2019  3:00 AM   Needle Insertion Time 0500 6/26/2019  3:00 AM   Number of days: 1831            Percutaneous Central Line Insertion/Assessment - double lumen  06/18/19 0510 (Active)   Dressing biopatch in place;dressing dry and intact 6/30/2019  7:10 AM   Securement secured w/ sutures 6/30/2019  7:10 AM   Additional Site Signs no erythema;no warmth 6/30/2019  7:10 AM   Distal Patency/Care infusing 6/30/2019  7:10 AM   Proximal Patency/Care infusing 6/30/2019  7:10 AM   Waveform normal 6/30/2019  7:10 AM   Line Interventions line leveled/zeroed 6/30/2019  7:10 AM   Dressing Change Due 06/29/19 6/30/2019  3:00 AM   Daily Line Review Performed 6/30/2019  7:10 AM   Number of days: 12            Peripheral IV - Single Lumen  06/27/19 0917 22 G Right Forearm (Active)   Site Assessment Clean;Dry;Intact;No redness;No swelling 6/30/2019  7:10 AM   Line Status Infusing 6/30/2019  7:10 AM   Dressing Status Clean;Dry;Intact 6/30/2019  7:10 AM   Dressing Intervention Dressing reinforced 6/29/2019  3:00 AM   Dressing Change Due 07/01/19 6/30/2019  3:00 AM   Site Change Due 07/01/19 6/29/2019  7:00 PM   Reason Not Rotated Not due 6/30/2019  7:10 AM   Number of days: 3            Chest Tube 06/29/19 1655 1 Right Fourth intercostal space;Midaxillary 7 Fr. (Active)   Chest Tube WDL WDL 6/30/2019  7:10 AM   Function -20 cm H2O 6/30/2019  7:10 AM   Air Leak/Fluctuation air leak present 6/30/2019  7:10 AM   Safety all tubing connections taped;2 rubber-tipped hemostats w/ patient;all connections secured;suction checked 6/30/2019  7:10 AM   Securement tubing secured to body distal to insertion site w/ tape 6/30/2019  7:10 AM   Patency Intervention Tip/tilt 6/30/2019  7:10 AM   Drainage Description Serosanguineous 6/30/2019  7:10 AM   Dressing Appearance occlusive gauze dressing intact 6/30/2019  7:10 AM   Dressing Care dressing changed 6/30/2019 10:00 AM   Left Subcutaneous Emphysema none present 6/30/2019  7:10 AM   Right Subcutaneous Emphysema none present 6/30/2019  7:10 AM   Site Assessment Clean;Dry;Intact;No redness;No swelling;Ecchymotic 6/30/2019 10:00 AM   Surrounding Skin Dry;Intact 6/30/2019  7:10 AM   Output (mL) 0 mL 6/30/2019 10:00 AM   Number of days: 0            Y Chest Tube 3 and 4 06/18/19 1151 3 Left Pleural 19 Fr. 4 Left Mediastinal 19 Fr. (Active)   Function -20 cm H2O 6/30/2019  7:10 AM   Air Leak/Fluctuation air leak not present 6/30/2019  7:10 AM   Safety all tubing connections taped;2 rubber-tipped hemostats w/ patient;all connections secured;suction checked 6/30/2019  7:10 AM   Securement tubing secured to body distal to insertion site w/ tape 6/30/2019  7:10 AM   Left Subcutaneous Emphysema none present 6/30/2019  7:10 AM    Right Subcutaneous Emphysema none present 6/30/2019  7:10 AM   Patency Intervention Tip/tilt 6/30/2019  7:10 AM   Drainage Description 3 Serosanguineous 6/30/2019  7:10 AM   Tube 3 Dressing Appearance occlusive gauze dressing intact 6/30/2019  7:10 AM   Tube 3 Dressing Care dressing changed 6/30/2019 10:00 AM   Site Assessment 3 Clean;Dry;Intact 6/30/2019 10:00 AM   Surrounding Skin 3 Dry;Intact 6/30/2019  7:10 AM   Drainage Description 4 Serosanguineous 6/30/2019  7:10 AM   Tube 4 Dressing Appearance occlusive gauze dressing intact 6/30/2019  7:10 AM   Tube 4 Dressing Care dressing changed 6/30/2019 10:00 AM   Site Assessment 4 Clean;Dry;Intact 6/30/2019 10:00 AM   Surrounding Skin 4 Dry;Intact 6/30/2019  7:10 AM   Output (mL) 0 mL 6/30/2019 10:00 AM   Number of days: 11       Significant Labs:  CBC:  Recent Labs   Lab 07/05/19  0550   WBC 5.79   RBC 2.34*   HGB 6.6*   HCT 22.0*   *   MCV 94   MCH 28.2   MCHC 30.0*     BMP:  Recent Labs   Lab 07/05/19  0550   *   K 4.3      CO2 20*   BUN 14   CREATININE 1.2   CALCIUM 7.7*      Tacrolimus Levels:  Recent Labs   Lab 07/05/19  0550   TACROLIMUS 5.6     Microbiology:  Microbiology Results (last 7 days)     Procedure Component Value Units Date/Time    Fungus culture [[id number]] Collected:  06/20/19 0808    Order Status:  Completed Specimen:  Respiratory from Bronchial Wash Updated:  07/03/19 1000     Fungus (Mycology) Culture Culture in progress      No fungus isolated after 2 weeks          I have reviewed all pertinent labs within the past 24 hours.    Diagnostic Results:  Chest X-Ray: Vascular catheters, right pleural drain and monitoring leads remain.  There is some high-density material in the upper abdomen likely from the modified barium swallow.  Heart is enlarged there is mild increase in the pulmonary vascular markings.  Persistent parenchymal opacification bilaterally.  No significant pneumothorax seen.  Probable right pleural effusion.            Assessment/Plan:     * S/P lung transplant  POD#17 s/p bilateral lung transplant (re-transplant) for CARLOS EDUARDO. 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 PGD 0. S/p bronch and extubated to HFNC on POD#2, but was re-intubated 6/21 for hypercapnic respiratory failure. Right pigtail placed for PTX on 6/29. CTS ADIS Carlotta aware of right chest tube output.    Continue PT/OT/. Continue scheduled nebs and IS/CPT. Continue immunosuppression and prophylaxis. OR bronchoscopy scheduled for this afternoon.    Immunosuppression  Previously on tacrolimus, MMF, and daily prednisone. S/p solumedrol in OR, basiliximab on POD#0 and POD#4. Continue MMF, tacrolimus, and steroid taper. Monitor daily tacrolimus levels and adjust dose as needed.       Prophylactic antibiotic  CMV D-/R+. Continue OIP with ganciclovir, fluconazole, and dapsone. Bronch wash 6/20 and 6/23 with  Pseudomonas and Candida Glabrata. Vancomycin discontinued. Continue ciprofloxacin 400 mg Q8hrs.    Acute blood loss anemia  Received 6u PRBCs, 2u plts, 1 cryo, and 4 FFP angie-operatively. H/H with slow down trend over the past several days likely related to repeated lab draws. plan for 1u PRBC transfusion this afternoon. Will re-check CBC tomorrow morning with AM labs.    Adrenal cortical steroids causing adverse effect in therapeutic use  Endocrinology consulted, appreciate recs.    CF related Pancreatic insufficiency  Continue Creon with meals and snacks.     Chronic pain with opiate use  Anesthesia consulted for pain mgmt. Continue aggressive bowel regimen. Continue with scheduled morphine Q6hrs. Pain management following, appreciate recs.     Steroid-induced hyperglycemia  Endocrine following, appreciate recs.     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 ReceivedTobramycin x1 on 6/22. Continue ciprofloxacin. Plan for repeat bronchial wash cultures to be collected this  afternoon.        Jacqueline Dumont PA-C  Lung Transplant  [facility]

## 2019-07-05 NOTE — PLAN OF CARE
Problem: SLP Goal  Goal: SLP Goal  Speech Language Pathology Goals  Goals expected to be met by 7/4:  1. Patient will participate in ongoing swallow assessment.   2. When determined by SLP, patient may benefit from a repeat modified barium swallow study to further objectively assess swallow function/safety.        Patient NPO for bronch. Continue ST POC.  Emily P. Abadie M.S., CCC-SLP  Speech Language Pathologist  [phone]  07/05/2019

## 2019-07-05 NOTE — ASSESSMENT & PLAN NOTE
Received 6u PRBCs, 2u plts, 1 cryo, and 4 FFP angie-operatively. H/H with slow down trend over the past several days likely related to repeated lab draws. plan for 1u PRBC transfusion this afternoon. Will re-check CBC tomorrow morning with AM labs.

## 2019-07-05 NOTE — PLAN OF CARE
Problem: Adult Inpatient Plan of Care  Goal: Plan of Care Review  Outcome: Ongoing (interventions implemented as appropriate)    -Pt AAOx4 with  at the bedside. Pt  is supportive and actively involved in pt care.  -Vital signs stable; pt placed on 0.5 L per NC while sleeping to maintain sats >92%  -Telemetry monitoring NSR ; HR in the 80's  -BG monitoring ACHS and 0200  -Clam shell incision KENNETH with derma bond.  -R CT to wall suction. 0 mL output. Dressing changed.  -PRN Ativan, Dilaudid, & Reglan given  -Scheduled PO Morphine given. Administration deferred multiple times d/t difficulty getting pt to wake up enough to take pill.  -Pt c/o pain in chest aggravated by breathing, coughing, or talking.  -Plan for bronch today. Pt has been NPO since midnight.  Accuchecks being monitored AC/HS with bedtime sugar of  218 requiring  2 units sliding scale insulin coverage.  -Contact precautions maintained for . IV Gancyclovir, IV Cipro, and IV Cellcept given.  -Pt remained free from falls or injury thus far. Bed is in low/ locked position, side rails are up x 2, call light is in reach. Will continue to monitor.

## 2019-07-05 NOTE — ASSESSMENT & PLAN NOTE
Anesthesia consulted for pain mgmt. Continue aggressive bowel regimen. Continue with scheduled morphine Q6hrs. Pain management following, appreciate recs.

## 2019-07-05 NOTE — ASSESSMENT & PLAN NOTE
POD#17 s/p bilateral lung transplant (re-transplant) for CARLSO EDUARDO. 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 PGD 0. S/p bronch and extubated to HFNC on POD#2, but was re-intubated 6/21 for hypercapnic respiratory failure. Right pigtail placed for PTX on 6/29. CTS ADIS Carlotta aware of right chest tube output.    Continue PT/OT/. Continue scheduled nebs and IS/CPT. Continue immunosuppression and prophylaxis. OR bronchoscopy scheduled for this afternoon.

## 2019-07-05 NOTE — SUBJECTIVE & OBJECTIVE
Subjective:     Interval History: No acute events overnight. Reports generalized pain this morning secondary to working with PT yesterday. Having BMs and tolerating diet. Continues to report difficulty clearing secretions. Refusing Mucomyst nebulizer treatments. Continue to encouraged all scheduled nebs, CPT, and IS use. Plan for 1u PRBC transfusion today and bronchoscopy this afternoon.     Continuous Infusions:    Scheduled Meds:   acetylcysteine 200 mg/ml (20%)  4 mL Nebulization Q12H    ciprofloxacin  400 mg Intravenous Q8H    enoxaparin  40 mg Subcutaneous Daily    famotidine  20 mg Oral BID    fluconazole  400 mg Oral Daily    gabapentin  300 mg Oral TID    ganciclovir (CYTOVENE) IVPB  5 mg/kg Intravenous Q12H    insulin aspart U-100  3-5 Units Subcutaneous TIDWM    levalbuterol  1.25 mg Nebulization Q6H WAKE    lidocaine  1 patch Transdermal Q24H    lidocaine  1 patch Transdermal Q24H    lipase-protease-amylase 24,000-76,000-120,000 units  4 capsule Oral TID WM    metoprolol tartrate  25 mg Oral TID    morphine  15 mg Oral Q6H    mycophenolate (CELLCEPT) IVPB  500 mg Intravenous BID    polyethylene glycol  17 g Per NG tube BID    predniSONE  20 mg Oral Daily    QUEtiapine  25 mg Oral Daily    QUEtiapine  50 mg Oral QHS    sodium chloride 7%  4 mL Nebulization Q12H    tacrolimus  0.5 mg Sublingual BID     PRN Meds:sodium chloride, bisacodyl, Dextrose 10% Bolus, Dextrose 10% Bolus, diphenhydrAMINE, glucagon (human recombinant), glucose, glucose, HYDROmorphone, insulin aspart U-100, lactulose, lipase-protease-amylase 24,000-76,000-120,000 units, lorazepam, magnesium sulfate IVPB, metoclopramide HCl, metoprolol, naloxone, OLANZapine, ondansetron, ondansetron, potassium chloride in water **AND** potassium chloride in water **AND** potassium chloride in water, promethazine (PHENERGAN) IVPB    Review of patient's allergies indicates:   Allergen Reactions    Albuterol Palpitations    Colistin  Anaphylaxis    Vancomycin analogues      Infusion reaction that does not resolve with slowing  Pt. States she can tolerate it when given with 50 mg Benadryl and ran over 3 hours    Neupogen [filgrastim] Other (See Comments)     Ostealgia after five daily doses of 300 mcg.      Bactrim [sulfamethoxazole-trimethoprim] Hives    Ceftazidime Hives     Pt stated can tolerate cefapine not ceftazidime    Ceftazidime     Dronabinol Other (See Comments)     Mental changes/hallucinations    Haldol [haloperidol lactate] Other (See Comments)     Seizure like activity    Nsaids (non-steroidal anti-inflammatory drug)      Cannot have due to lung transplant    Adhesive Rash     Cloth tape- please use tegaderm or paper tape    Aztreonam Rash    Ciprofloxacin Nausea And Vomiting     Projectile N/V, per patient.  Unwilling to retry therapy.       Review of Systems   Constitutional: Negative for appetite change, chills, diaphoresis and fever.   HENT: Negative for congestion, postnasal drip, rhinorrhea, sinus pressure and sinus pain.    Respiratory: Positive for cough and shortness of breath. Negative for wheezing.    Cardiovascular: Positive for chest pain (insertion site). Negative for palpitations and leg swelling.   Gastrointestinal: Negative for abdominal distention, diarrhea, nausea and vomiting.   Genitourinary: Negative for decreased urine volume, difficulty urinating, dysuria and urgency.   Musculoskeletal: Positive for back pain (chronic).   Skin: Negative for rash and wound.   Allergic/Immunologic: Positive for immunocompromised state.   Neurological: Positive for weakness. Negative for dizziness, syncope and headaches.   Psychiatric/Behavioral: Negative for confusion. The patient is nervous/anxious.      Objective:   Physical Exam   Constitutional: She is oriented to person, place, and time. She appears well-developed and well-nourished. She is easily aroused. No distress.   HENT:   Head: Normocephalic and  atraumatic.   Right Ear: External ear normal.   Left Ear: External ear normal.   Nose: Nose normal.   Eyes: Conjunctivae and EOM are normal. No scleral icterus.   Neck: Normal range of motion. Neck supple. No JVD present.   Cardiovascular: Regular rhythm. Tachycardia present. Exam reveals no friction rub.   No murmur heard.  Pulmonary/Chest: No accessory muscle usage. No respiratory distress. She has decreased breath sounds in the right lower field and the left lower field. She has no wheezes. She has rhonchi in the right upper field and the left upper field. She has no rales.   Right chest tube to suction, no air leak   Abdominal: Soft. Bowel sounds are normal. She exhibits no distension. There is no tenderness.   Musculoskeletal: She exhibits no edema.   Neurological: She is alert, oriented to person, place, and time and easily aroused. No cranial nerve deficit.   Skin: Skin is warm and dry. No rash noted. She is not diaphoretic. No erythema.   Psychiatric: Her mood appears anxious.   Nursing note and vitals reviewed.        Vital Signs (Most Recent):  Temp: 98.7 °F (37.1 °C) (07/05/19 1108)  Pulse: 110 (07/05/19 1140)  Resp: 12 (07/05/19 1108)  BP: 99/62 (07/05/19 1108)  SpO2: (!) 91 % (07/05/19 1108) Vital Signs (24h Range):  Temp:  [97.6 °F (36.4 °C)-99.1 °F (37.3 °C)] 98.7 °F (37.1 °C)  Pulse:  [] 110  Resp:  [12-20] 12  SpO2:  [85 %-100 %] 91 %  BP: ()/(62-87) 99/62     Weight: 65.7 kg (144 lb 13.5 oz)  Body mass index is 27.37 kg/m².      Intake/Output Summary (Last 24 hours) at 7/5/2019 1255  Last data filed at 7/5/2019 0900  Gross per 24 hour   Intake 900 ml   Output 1200 ml   Net -300 ml       Ventilator Data:     Oxygen Concentration (%):  [94] 94    Hemodynamic Parameters:       Lines/Drains:       Introducer right internal jugular (Active)   Specific Qualities Capped 6/30/2019  7:10 AM   Dressing Status Clean;Dry;Biopatch in place;Intact 6/30/2019  7:10 AM   Dressing Intervention Dressing  reinforced 6/29/2019  3:00 AM   Dressing Change Due 06/29/19 6/30/2019  3:00 AM   Daily Line Review Performed 6/30/2019  7:10 AM   Number of days:             Port A Cath Single Lumen 06/24/14 1200 right subclavian (Active)   Accessed by: Radha Lanza 6/24/2019  5:00 AM   Dressing Type Transparent 6/30/2019  7:10 AM   Dressing Status Biopatch in place;Clean;Dry;Intact 6/30/2019  7:10 AM   Dressing Intervention Dressing reinforced 6/29/2019  3:00 AM   Dressing Change Due 06/30/19 6/30/2019  3:00 AM   Line Status Infusing 6/30/2019  7:10 AM   Flush Performed Yes 6/30/2019  7:10 AM   Date to be Reflushed 06/18/19 6/29/2019  3:00 AM   Daily Line Review Performed 6/30/2019  7:10 AM   Type of Needle Nicole 6/30/2019  3:00 AM   Gauge 20 6/30/2019  3:00 AM   Needle Length 1 in 6/30/2019  3:00 AM   Needle Status Left in place 6/30/2019  3:00 AM   Needle Insertion Date 06/24/19 6/30/2019  3:00 AM   Needle Insertion Time 0500 6/26/2019  3:00 AM   Number of days: 1831            Percutaneous Central Line Insertion/Assessment - double lumen  06/18/19 0510 (Active)   Dressing biopatch in place;dressing dry and intact 6/30/2019  7:10 AM   Securement secured w/ sutures 6/30/2019  7:10 AM   Additional Site Signs no erythema;no warmth 6/30/2019  7:10 AM   Distal Patency/Care infusing 6/30/2019  7:10 AM   Proximal Patency/Care infusing 6/30/2019  7:10 AM   Waveform normal 6/30/2019  7:10 AM   Line Interventions line leveled/zeroed 6/30/2019  7:10 AM   Dressing Change Due 06/29/19 6/30/2019  3:00 AM   Daily Line Review Performed 6/30/2019  7:10 AM   Number of days: 12            Peripheral IV - Single Lumen 06/27/19 0917 22 G Right Forearm (Active)   Site Assessment Clean;Dry;Intact;No redness;No swelling 6/30/2019  7:10 AM   Line Status Infusing 6/30/2019  7:10 AM   Dressing Status Clean;Dry;Intact 6/30/2019  7:10 AM   Dressing Intervention Dressing reinforced 6/29/2019  3:00 AM   Dressing Change Due 07/01/19 6/30/2019  3:00 AM    Site Change Due 07/01/19 6/29/2019  7:00 PM   Reason Not Rotated Not due 6/30/2019  7:10 AM   Number of days: 3            Chest Tube 06/29/19 1655 1 Right Fourth intercostal space;Midaxillary 7 Fr. (Active)   Chest Tube WDL WDL 6/30/2019  7:10 AM   Function -20 cm H2O 6/30/2019  7:10 AM   Air Leak/Fluctuation air leak present 6/30/2019  7:10 AM   Safety all tubing connections taped;2 rubber-tipped hemostats w/ patient;all connections secured;suction checked 6/30/2019  7:10 AM   Securement tubing secured to body distal to insertion site w/ tape 6/30/2019  7:10 AM   Patency Intervention Tip/tilt 6/30/2019  7:10 AM   Drainage Description Serosanguineous 6/30/2019  7:10 AM   Dressing Appearance occlusive gauze dressing intact 6/30/2019  7:10 AM   Dressing Care dressing changed 6/30/2019 10:00 AM   Left Subcutaneous Emphysema none present 6/30/2019  7:10 AM   Right Subcutaneous Emphysema none present 6/30/2019  7:10 AM   Site Assessment Clean;Dry;Intact;No redness;No swelling;Ecchymotic 6/30/2019 10:00 AM   Surrounding Skin Dry;Intact 6/30/2019  7:10 AM   Output (mL) 0 mL 6/30/2019 10:00 AM   Number of days: 0            Y Chest Tube 3 and 4 06/18/19 1151 3 Left Pleural 19 Fr. 4 Left Mediastinal 19 Fr. (Active)   Function -20 cm H2O 6/30/2019  7:10 AM   Air Leak/Fluctuation air leak not present 6/30/2019  7:10 AM   Safety all tubing connections taped;2 rubber-tipped hemostats w/ patient;all connections secured;suction checked 6/30/2019  7:10 AM   Securement tubing secured to body distal to insertion site w/ tape 6/30/2019  7:10 AM   Left Subcutaneous Emphysema none present 6/30/2019  7:10 AM   Right Subcutaneous Emphysema none present 6/30/2019  7:10 AM   Patency Intervention Tip/tilt 6/30/2019  7:10 AM   Drainage Description 3 Serosanguineous 6/30/2019  7:10 AM   Tube 3 Dressing Appearance occlusive gauze dressing intact 6/30/2019  7:10 AM   Tube 3 Dressing Care dressing changed 6/30/2019 10:00 AM   Site Assessment 3  Clean;Dry;Intact 6/30/2019 10:00 AM   Surrounding Skin 3 Dry;Intact 6/30/2019  7:10 AM   Drainage Description 4 Serosanguineous 6/30/2019  7:10 AM   Tube 4 Dressing Appearance occlusive gauze dressing intact 6/30/2019  7:10 AM   Tube 4 Dressing Care dressing changed 6/30/2019 10:00 AM   Site Assessment 4 Clean;Dry;Intact 6/30/2019 10:00 AM   Surrounding Skin 4 Dry;Intact 6/30/2019  7:10 AM   Output (mL) 0 mL 6/30/2019 10:00 AM   Number of days: 11       Significant Labs:  CBC:  Recent Labs   Lab 07/05/19  0550   WBC 5.79   RBC 2.34*   HGB 6.6*   HCT 22.0*   *   MCV 94   MCH 28.2   MCHC 30.0*     BMP:  Recent Labs   Lab 07/05/19  0550   *   K 4.3      CO2 20*   BUN 14   CREATININE 1.2   CALCIUM 7.7*      Tacrolimus Levels:  Recent Labs   Lab 07/05/19  0550   TACROLIMUS 5.6     Microbiology:  Microbiology Results (last 7 days)     Procedure Component Value Units Date/Time    Fungus culture [[id number]] Collected:  06/20/19 0808    Order Status:  Completed Specimen:  Respiratory from Bronchial Wash Updated:  07/03/19 1000     Fungus (Mycology) Culture Culture in progress      No fungus isolated after 2 weeks          I have reviewed all pertinent labs within the past 24 hours.    Diagnostic Results:  Chest X-Ray: Vascular catheters, right pleural drain and monitoring leads remain.  There is some high-density material in the upper abdomen likely from the modified barium swallow.  Heart is enlarged there is mild increase in the pulmonary vascular markings.  Persistent parenchymal opacification bilaterally.  No significant pneumothorax seen.  Probable right pleural effusion.

## 2019-07-06 PROBLEM — B37.9 CANDIDA GLABRATA INFECTION: Status: ACTIVE | Noted: 2019-01-01

## 2019-07-06 NOTE — CARE UPDATE
Rapid Response Nurse Follow-up Note     Followed up with patient for proactive rounding.   No acute issues at this time. Reviewed plan of care with primary RN, Ellen.   Please call Rapid Response RN, Nancy Stewart RN with any questions or concerns at [phone].

## 2019-07-06 NOTE — ASSESSMENT & PLAN NOTE
Anesthesia consulted for pain mgmt. Continue aggressive bowel regimen. Continue with scheduled morphine Q6hrs. Dilaudid decreased to 0.75 mg Q4hrs PRN.

## 2019-07-06 NOTE — ASSESSMENT & PLAN NOTE
Pseudomonas from 6/20 and 6/23 bronchial wash. ReceivedTobramycin x1 on 6/22. Continue ciprofloxacin. Follow up on bronchial wash cultures from 7/5.

## 2019-07-06 NOTE — PROGRESS NOTES
[facility]  Lung Transplant  Progress Note - Floor    Patient Name: Juanita Ibarra  MRN: [id number]  Admission Date: 6/17/2019  Hospital Length of Stay: 18 days  Post-Operative Day: 1850 (Lung), 18 (Lung)  Attending Physician: Francisca Morin DO  Primary Care Provider: Primary Doctor No     Subjective:     Interval History: Febrile overnight to Tmax 101.6, likely from bronchoscopy yesterday afternoon.  Discussed with ID, Dr. Francis, will begin micafungin for Candida glabrata. CXR PA and lateral this afternoon.    Continuous Infusions:    Scheduled Meds:   acetylcysteine 200 mg/ml (20%)  4 mL Nebulization Q12H    ciprofloxacin  400 mg Intravenous Q8H    dapsone  100 mg Oral Daily    enoxaparin  40 mg Subcutaneous Daily    famotidine  20 mg Oral BID    gabapentin  300 mg Oral TID    ganciclovir (CYTOVENE) IVPB  5 mg/kg Intravenous Q12H    insulin aspart U-100  4-6 Units Subcutaneous TIDWM    levalbuterol  1.25 mg Nebulization Q6H WAKE    lidocaine  1 patch Transdermal Q24H    lidocaine  1 patch Transdermal Q24H    lipase-protease-amylase 24,000-76,000-120,000 units  4 capsule Oral TID WM    metoprolol tartrate  25 mg Oral TID    micafungin (MYCAMINE) IVPB  100 mg Intravenous Q24H    morphine  15 mg Oral Q6H    mycophenolate (CELLCEPT) IVPB  500 mg Intravenous BID    polyethylene glycol  17 g Per NG tube BID    predniSONE  20 mg Oral Daily    QUEtiapine  25 mg Oral Daily    QUEtiapine  50 mg Oral QHS    sodium chloride 7%  4 mL Nebulization Q12H    tacrolimus  0.5 mg Sublingual BID     PRN Meds:sodium chloride, acetaminophen, bisacodyl, Dextrose 10% Bolus, Dextrose 10% Bolus, diphenhydrAMINE, glucagon (human recombinant), glucose, glucose, HYDROmorphone, insulin aspart U-100, insulin aspart U-100, lactulose, lipase-protease-amylase 24,000-76,000-120,000 units, lorazepam, magnesium sulfate IVPB, metoclopramide HCl, metoprolol, naloxone, OLANZapine, ondansetron,  ondansetron, potassium chloride in water **AND** potassium chloride in water **AND** potassium chloride in water, promethazine (PHENERGAN) IVPB    Review of patient's allergies indicates:   Allergen Reactions    Albuterol Palpitations    Colistin Anaphylaxis    Vancomycin analogues      Infusion reaction that does not resolve with slowing  Pt. States she can tolerate it when given with 50 mg Benadryl and ran over 3 hours    Neupogen [filgrastim] Other (See Comments)     Ostealgia after five daily doses of 300 mcg.      Bactrim [sulfamethoxazole-trimethoprim] Hives    Ceftazidime Hives     Pt stated can tolerate cefapine not ceftazidime    Ceftazidime     Dronabinol Other (See Comments)     Mental changes/hallucinations    Haldol [haloperidol lactate] Other (See Comments)     Seizure like activity    Nsaids (non-steroidal anti-inflammatory drug)      Cannot have due to lung transplant    Adhesive Rash     Cloth tape- please use tegaderm or paper tape    Aztreonam Rash    Ciprofloxacin Nausea And Vomiting     Projectile N/V, per patient.  Unwilling to retry therapy.       Review of Systems   Constitutional: Positive for fever. Negative for appetite change, chills and diaphoresis.   HENT: Negative for congestion, postnasal drip, rhinorrhea, sinus pressure and sinus pain.    Respiratory: Positive for cough and shortness of breath. Negative for wheezing.    Cardiovascular: Positive for chest pain (insertion site). Negative for palpitations and leg swelling.   Gastrointestinal: Negative for abdominal distention, diarrhea, nausea and vomiting.   Genitourinary: Negative for decreased urine volume, difficulty urinating, dysuria and urgency.   Musculoskeletal: Positive for back pain (chronic).   Skin: Negative for rash and wound.   Allergic/Immunologic: Positive for immunocompromised state.   Neurological: Positive for weakness. Negative for dizziness, syncope and headaches.   Psychiatric/Behavioral: Negative for  confusion. The patient is nervous/anxious.      Objective:   Physical Exam   Constitutional: She is oriented to person, place, and time. She appears well-developed and well-nourished. She is easily aroused. No distress.   HENT:   Head: Normocephalic and atraumatic.   Right Ear: External ear normal.   Left Ear: External ear normal.   Nose: Nose normal.   Eyes: Conjunctivae and EOM are normal. No scleral icterus.   Neck: Normal range of motion. Neck supple. No JVD present.   Cardiovascular: Regular rhythm. Tachycardia present. Exam reveals no friction rub.   No murmur heard.  Pulmonary/Chest: No accessory muscle usage. No respiratory distress. She has decreased breath sounds in the right lower field and the left lower field. She has no wheezes. She has rhonchi in the right upper field and the left upper field. She has no rales.   Right chest tube to suction, no air leak   Abdominal: Soft. Bowel sounds are normal. She exhibits no distension. There is no tenderness.   Musculoskeletal: She exhibits no edema.   Neurological: She is alert, oriented to person, place, and time and easily aroused. No cranial nerve deficit.   Skin: Skin is warm and dry. No rash noted. She is not diaphoretic. No erythema.   Psychiatric: Her mood appears anxious.   Nursing note and vitals reviewed.        Vital Signs (Most Recent):  Temp: 98.9 °F (37.2 °C) (07/06/19 0829)  Pulse: 90 (07/06/19 1100)  Resp: 16 (07/06/19 0829)  BP: 114/72 (07/06/19 0829)  SpO2: (!) 90 % (07/06/19 0829) Vital Signs (24h Range):  Temp:  [96.8 °F (36 °C)-101.6 °F (38.7 °C)] 98.9 °F (37.2 °C)  Pulse:  [] 90  Resp:  [10-21] 16  SpO2:  [89 %-100 %] 90 %  BP: (105-129)/(61-81) 114/72     Weight: 65.7 kg (144 lb 13.5 oz)  Body mass index is 27.37 kg/m².      Intake/Output Summary (Last 24 hours) at 7/6/2019 1129  Last data filed at 7/6/2019 1000  Gross per 24 hour   Intake 1375 ml   Output 1600 ml   Net -225 ml       Ventilator Data:          Hemodynamic  Parameters:       Lines/Drains:       Introducer right internal jugular (Active)   Specific Qualities Capped 6/30/2019  7:10 AM   Dressing Status Clean;Dry;Biopatch in place;Intact 6/30/2019  7:10 AM   Dressing Intervention Dressing reinforced 6/29/2019  3:00 AM   Dressing Change Due 06/29/19 6/30/2019  3:00 AM   Daily Line Review Performed 6/30/2019  7:10 AM   Number of days:             Port A Cath Single Lumen 06/24/14 1200 right subclavian (Active)   Accessed by: Radha Lanza 6/24/2019  5:00 AM   Dressing Type Transparent 6/30/2019  7:10 AM   Dressing Status Biopatch in place;Clean;Dry;Intact 6/30/2019  7:10 AM   Dressing Intervention Dressing reinforced 6/29/2019  3:00 AM   Dressing Change Due 06/30/19 6/30/2019  3:00 AM   Line Status Infusing 6/30/2019  7:10 AM   Flush Performed Yes 6/30/2019  7:10 AM   Date to be Reflushed 06/18/19 6/29/2019  3:00 AM   Daily Line Review Performed 6/30/2019  7:10 AM   Type of Needle Nicole 6/30/2019  3:00 AM   Gauge 20 6/30/2019  3:00 AM   Needle Length 1 in 6/30/2019  3:00 AM   Needle Status Left in place 6/30/2019  3:00 AM   Needle Insertion Date 06/24/19 6/30/2019  3:00 AM   Needle Insertion Time 0500 6/26/2019  3:00 AM   Number of days: 1831            Percutaneous Central Line Insertion/Assessment - double lumen  06/18/19 0510 (Active)   Dressing biopatch in place;dressing dry and intact 6/30/2019  7:10 AM   Securement secured w/ sutures 6/30/2019  7:10 AM   Additional Site Signs no erythema;no warmth 6/30/2019  7:10 AM   Distal Patency/Care infusing 6/30/2019  7:10 AM   Proximal Patency/Care infusing 6/30/2019  7:10 AM   Waveform normal 6/30/2019  7:10 AM   Line Interventions line leveled/zeroed 6/30/2019  7:10 AM   Dressing Change Due 06/29/19 6/30/2019  3:00 AM   Daily Line Review Performed 6/30/2019  7:10 AM   Number of days: 12            Peripheral IV - Single Lumen 06/27/19 0917 22 G Right Forearm (Active)   Site Assessment Clean;Dry;Intact;No redness;No swelling  6/30/2019  7:10 AM   Line Status Infusing 6/30/2019  7:10 AM   Dressing Status Clean;Dry;Intact 6/30/2019  7:10 AM   Dressing Intervention Dressing reinforced 6/29/2019  3:00 AM   Dressing Change Due 07/01/19 6/30/2019  3:00 AM   Site Change Due 07/01/19 6/29/2019  7:00 PM   Reason Not Rotated Not due 6/30/2019  7:10 AM   Number of days: 3            Chest Tube 06/29/19 1655 1 Right Fourth intercostal space;Midaxillary 7 Fr. (Active)   Chest Tube WDL WDL 6/30/2019  7:10 AM   Function -20 cm H2O 6/30/2019  7:10 AM   Air Leak/Fluctuation air leak present 6/30/2019  7:10 AM   Safety all tubing connections taped;2 rubber-tipped hemostats w/ patient;all connections secured;suction checked 6/30/2019  7:10 AM   Securement tubing secured to body distal to insertion site w/ tape 6/30/2019  7:10 AM   Patency Intervention Tip/tilt 6/30/2019  7:10 AM   Drainage Description Serosanguineous 6/30/2019  7:10 AM   Dressing Appearance occlusive gauze dressing intact 6/30/2019  7:10 AM   Dressing Care dressing changed 6/30/2019 10:00 AM   Left Subcutaneous Emphysema none present 6/30/2019  7:10 AM   Right Subcutaneous Emphysema none present 6/30/2019  7:10 AM   Site Assessment Clean;Dry;Intact;No redness;No swelling;Ecchymotic 6/30/2019 10:00 AM   Surrounding Skin Dry;Intact 6/30/2019  7:10 AM   Output (mL) 0 mL 6/30/2019 10:00 AM   Number of days: 0            Y Chest Tube 3 and 4 06/18/19 1151 3 Left Pleural 19 Fr. 4 Left Mediastinal 19 Fr. (Active)   Function -20 cm H2O 6/30/2019  7:10 AM   Air Leak/Fluctuation air leak not present 6/30/2019  7:10 AM   Safety all tubing connections taped;2 rubber-tipped hemostats w/ patient;all connections secured;suction checked 6/30/2019  7:10 AM   Securement tubing secured to body distal to insertion site w/ tape 6/30/2019  7:10 AM   Left Subcutaneous Emphysema none present 6/30/2019  7:10 AM   Right Subcutaneous Emphysema none present 6/30/2019  7:10 AM   Patency Intervention Tip/tilt 6/30/2019   7:10 AM   Drainage Description 3 Serosanguineous 6/30/2019  7:10 AM   Tube 3 Dressing Appearance occlusive gauze dressing intact 6/30/2019  7:10 AM   Tube 3 Dressing Care dressing changed 6/30/2019 10:00 AM   Site Assessment 3 Clean;Dry;Intact 6/30/2019 10:00 AM   Surrounding Skin 3 Dry;Intact 6/30/2019  7:10 AM   Drainage Description 4 Serosanguineous 6/30/2019  7:10 AM   Tube 4 Dressing Appearance occlusive gauze dressing intact 6/30/2019  7:10 AM   Tube 4 Dressing Care dressing changed 6/30/2019 10:00 AM   Site Assessment 4 Clean;Dry;Intact 6/30/2019 10:00 AM   Surrounding Skin 4 Dry;Intact 6/30/2019  7:10 AM   Output (mL) 0 mL 6/30/2019 10:00 AM   Number of days: 11       Significant Labs:  CBC:  Recent Labs   Lab 07/06/19 0615   WBC 6.03   RBC 2.96*   HGB 8.5*   HCT 27.7*   *   MCV 94   MCH 28.7   MCHC 30.7*     BMP:  Recent Labs   Lab 07/06/19 0615   *   K 5.0      CO2 19*   BUN 12   CREATININE 1.2   CALCIUM 7.8*      Tacrolimus Levels:  Recent Labs   Lab 07/06/19 0615   TACROLIMUS 6.2     Microbiology:  Microbiology Results (last 7 days)     Procedure Component Value Units Date/Time    Culture, Respiratory [[id number]] Collected:  07/05/19 1445    Order Status:  Completed Specimen:  Respiratory from Bronchial Wash Updated:  07/05/19 1802     Gram Stain (Respiratory) Few WBC's     Gram Stain (Respiratory) Few Gram positive rods     Gram Stain (Respiratory) Few budding yeast    Narrative:       Bronchial Wash    Fungus culture [[id number]] Collected:  07/05/19 1445    Order Status:  Sent Specimen:  Respiratory from Bronchial Wash Updated:  07/05/19 1505    AFB Culture & Smear [[id number]] Collected:  07/05/19 1445    Order Status:  Sent Specimen:  Respiratory from Bronchial Wash Updated:  07/05/19 1505    Fungus culture [[id number]] Collected:  06/20/19 0808    Order Status:  Completed Specimen:  Respiratory from Bronchial Wash Updated:  07/03/19 1000     Fungus (Mycology) Culture Culture in  progress      No fungus isolated after 2 weeks          I have reviewed all pertinent labs within the past 24 hours.    Diagnostic Results:  Chest X-Ray: Vascular catheters, right pleural drain and monitoring leads remain.  There is some high-density material in the upper abdomen likely from the modified barium swallow.  Heart is enlarged there is mild increase in the pulmonary vascular markings.  Persistent parenchymal opacification bilaterally.  No significant pneumothorax seen.  Probable right pleural effusion.           Assessment/Plan:     * S/P lung transplant  POD#18 s/p bilateral lung transplant (re-transplant) for CARLOS EDUARDO. 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 PGD 0. S/p bronch and extubated to HFNC on POD#2, but was re-intubated 6/21 for hypercapnic respiratory failure. Right pigtail placed for PTX on 6/29. CTS ADIS Carlotta aware of right chest tube output.    Continue PT/OT/. Continue scheduled nebs and IS/CPT. Continue immunosuppression and prophylaxis. OR bronchoscopy 7/5 - cultures pending but with NGTD. CXR PA and Lateral this afternoon.     Immunosuppression  Previously on tacrolimus, MMF, and daily prednisone. S/p solumedrol in OR, basiliximab on POD#0 and POD#4. Continue MMF, tacrolimus, and steroid taper. Monitor daily tacrolimus levels and adjust dose as needed.       Prophylactic antibiotic  CMV D-/R+. Continue OIP with valganciclovir and dapsone. Bronch wash 6/20 and 6/23 with  Pseudomonas and Candida Glabrata. Vancomycin discontinued. Continue ciprofloxacin 400 mg Q8hrs. Fluconazole d/c today. Micafungin started.    Acute blood loss anemia  Received 6u PRBCs, 2u plts, 1 cryo, and 4 FFP angie-operatively. 1u PRBC on 7/5. H/H stable today.     Adrenal cortical steroids causing adverse effect in therapeutic use  Endocrinology consulted, appreciate recs.    CF related Pancreatic insufficiency  Continue Creon with meals and snacks.     Chronic pain with opiate use  Anesthesia consulted for pain mgmt.  Continue aggressive bowel regimen. Continue with scheduled morphine Q6hrs. Dilaudid decreased to 0.75 mg Q4hrs PRN.    Steroid-induced hyperglycemia  Endocrine following, appreciate recs.     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 ReceivedTobramycin x1 on 6/22. Continue ciprofloxacin. Follow up on bronchial wash cultures from 7/5.    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 Repeat BAL on 6/23 with Candida glabrata. Discussed with ID, Dr. Francis, will begin micafungin today.         Jacqueline Dumont PA-C  Lung Transplant  [facility]

## 2019-07-06 NOTE — ASSESSMENT & PLAN NOTE
History of Candida infections with initial transplant previously treated with micafungin in 05/2018. Repeat BAL on 6/23 with Candida glabrata. Discussed with ID, Dr. Francis, will begin micafungin today.

## 2019-07-06 NOTE — ASSESSMENT & PLAN NOTE
Received 6u PRBCs, 2u plts, 1 cryo, and 4 FFP angie-operatively. 1u PRBC on 7/5. H/H stable today.

## 2019-07-06 NOTE — SUBJECTIVE & OBJECTIVE
Subjective:     Interval History: Febrile overnight to Tmax 101.6, likely from bronchoscopy yesterday afternoon.  Discussed with ID, Dr. Francis, will begin micafungin for Candida glabrata. CXR PA and lateral this afternoon.    Continuous Infusions:    Scheduled Meds:   acetylcysteine 200 mg/ml (20%)  4 mL Nebulization Q12H    ciprofloxacin  400 mg Intravenous Q8H    dapsone  100 mg Oral Daily    enoxaparin  40 mg Subcutaneous Daily    famotidine  20 mg Oral BID    gabapentin  300 mg Oral TID    ganciclovir (CYTOVENE) IVPB  5 mg/kg Intravenous Q12H    insulin aspart U-100  4-6 Units Subcutaneous TIDWM    levalbuterol  1.25 mg Nebulization Q6H WAKE    lidocaine  1 patch Transdermal Q24H    lidocaine  1 patch Transdermal Q24H    lipase-protease-amylase 24,000-76,000-120,000 units  4 capsule Oral TID WM    metoprolol tartrate  25 mg Oral TID    micafungin (MYCAMINE) IVPB  100 mg Intravenous Q24H    morphine  15 mg Oral Q6H    mycophenolate (CELLCEPT) IVPB  500 mg Intravenous BID    polyethylene glycol  17 g Per NG tube BID    predniSONE  20 mg Oral Daily    QUEtiapine  25 mg Oral Daily    QUEtiapine  50 mg Oral QHS    sodium chloride 7%  4 mL Nebulization Q12H    tacrolimus  0.5 mg Sublingual BID     PRN Meds:sodium chloride, acetaminophen, bisacodyl, Dextrose 10% Bolus, Dextrose 10% Bolus, diphenhydrAMINE, glucagon (human recombinant), glucose, glucose, HYDROmorphone, insulin aspart U-100, insulin aspart U-100, lactulose, lipase-protease-amylase 24,000-76,000-120,000 units, lorazepam, magnesium sulfate IVPB, metoclopramide HCl, metoprolol, naloxone, OLANZapine, ondansetron, ondansetron, potassium chloride in water **AND** potassium chloride in water **AND** potassium chloride in water, promethazine (PHENERGAN) IVPB    Review of patient's allergies indicates:   Allergen Reactions    Albuterol Palpitations    Colistin Anaphylaxis    Vancomycin analogues      Infusion reaction that does not resolve  with slowing  Pt. States she can tolerate it when given with 50 mg Benadryl and ran over 3 hours    Neupogen [filgrastim] Other (See Comments)     Ostealgia after five daily doses of 300 mcg.      Bactrim [sulfamethoxazole-trimethoprim] Hives    Ceftazidime Hives     Pt stated can tolerate cefapine not ceftazidime    Ceftazidime     Dronabinol Other (See Comments)     Mental changes/hallucinations    Haldol [haloperidol lactate] Other (See Comments)     Seizure like activity    Nsaids (non-steroidal anti-inflammatory drug)      Cannot have due to lung transplant    Adhesive Rash     Cloth tape- please use tegaderm or paper tape    Aztreonam Rash    Ciprofloxacin Nausea And Vomiting     Projectile N/V, per patient.  Unwilling to retry therapy.       Review of Systems   Constitutional: Positive for fever. Negative for appetite change, chills and diaphoresis.   HENT: Negative for congestion, postnasal drip, rhinorrhea, sinus pressure and sinus pain.    Respiratory: Positive for cough and shortness of breath. Negative for wheezing.    Cardiovascular: Positive for chest pain (insertion site). Negative for palpitations and leg swelling.   Gastrointestinal: Negative for abdominal distention, diarrhea, nausea and vomiting.   Genitourinary: Negative for decreased urine volume, difficulty urinating, dysuria and urgency.   Musculoskeletal: Positive for back pain (chronic).   Skin: Negative for rash and wound.   Allergic/Immunologic: Positive for immunocompromised state.   Neurological: Positive for weakness. Negative for dizziness, syncope and headaches.   Psychiatric/Behavioral: Negative for confusion. The patient is nervous/anxious.      Objective:   Physical Exam   Constitutional: She is oriented to person, place, and time. She appears well-developed and well-nourished. She is easily aroused. No distress.   HENT:   Head: Normocephalic and atraumatic.   Right Ear: External ear normal.   Left Ear: External ear  normal.   Nose: Nose normal.   Eyes: Conjunctivae and EOM are normal. No scleral icterus.   Neck: Normal range of motion. Neck supple. No JVD present.   Cardiovascular: Regular rhythm. Tachycardia present. Exam reveals no friction rub.   No murmur heard.  Pulmonary/Chest: No accessory muscle usage. No respiratory distress. She has decreased breath sounds in the right lower field and the left lower field. She has no wheezes. She has rhonchi in the right upper field and the left upper field. She has no rales.   Right chest tube to suction, no air leak   Abdominal: Soft. Bowel sounds are normal. She exhibits no distension. There is no tenderness.   Musculoskeletal: She exhibits no edema.   Neurological: She is alert, oriented to person, place, and time and easily aroused. No cranial nerve deficit.   Skin: Skin is warm and dry. No rash noted. She is not diaphoretic. No erythema.   Psychiatric: Her mood appears anxious.   Nursing note and vitals reviewed.        Vital Signs (Most Recent):  Temp: 98.9 °F (37.2 °C) (07/06/19 0829)  Pulse: 90 (07/06/19 1100)  Resp: 16 (07/06/19 0829)  BP: 114/72 (07/06/19 0829)  SpO2: (!) 90 % (07/06/19 0829) Vital Signs (24h Range):  Temp:  [96.8 °F (36 °C)-101.6 °F (38.7 °C)] 98.9 °F (37.2 °C)  Pulse:  [] 90  Resp:  [10-21] 16  SpO2:  [89 %-100 %] 90 %  BP: (105-129)/(61-81) 114/72     Weight: 65.7 kg (144 lb 13.5 oz)  Body mass index is 27.37 kg/m².      Intake/Output Summary (Last 24 hours) at 7/6/2019 1129  Last data filed at 7/6/2019 1000  Gross per 24 hour   Intake 1375 ml   Output 1600 ml   Net -225 ml       Ventilator Data:          Hemodynamic Parameters:       Lines/Drains:       Introducer right internal jugular (Active)   Specific Qualities Capped 6/30/2019  7:10 AM   Dressing Status Clean;Dry;Biopatch in place;Intact 6/30/2019  7:10 AM   Dressing Intervention Dressing reinforced 6/29/2019  3:00 AM   Dressing Change Due 06/29/19 6/30/2019  3:00 AM   Daily Line Review  Performed 6/30/2019  7:10 AM   Number of days:             Port A Cath Single Lumen 06/24/14 1200 right subclavian (Active)   Accessed by: Radha Lanza 6/24/2019  5:00 AM   Dressing Type Transparent 6/30/2019  7:10 AM   Dressing Status Biopatch in place;Clean;Dry;Intact 6/30/2019  7:10 AM   Dressing Intervention Dressing reinforced 6/29/2019  3:00 AM   Dressing Change Due 06/30/19 6/30/2019  3:00 AM   Line Status Infusing 6/30/2019  7:10 AM   Flush Performed Yes 6/30/2019  7:10 AM   Date to be Reflushed 06/18/19 6/29/2019  3:00 AM   Daily Line Review Performed 6/30/2019  7:10 AM   Type of Needle Nicole 6/30/2019  3:00 AM   Gauge 20 6/30/2019  3:00 AM   Needle Length 1 in 6/30/2019  3:00 AM   Needle Status Left in place 6/30/2019  3:00 AM   Needle Insertion Date 06/24/19 6/30/2019  3:00 AM   Needle Insertion Time 0500 6/26/2019  3:00 AM   Number of days: 1831            Percutaneous Central Line Insertion/Assessment - double lumen  06/18/19 0510 (Active)   Dressing biopatch in place;dressing dry and intact 6/30/2019  7:10 AM   Securement secured w/ sutures 6/30/2019  7:10 AM   Additional Site Signs no erythema;no warmth 6/30/2019  7:10 AM   Distal Patency/Care infusing 6/30/2019  7:10 AM   Proximal Patency/Care infusing 6/30/2019  7:10 AM   Waveform normal 6/30/2019  7:10 AM   Line Interventions line leveled/zeroed 6/30/2019  7:10 AM   Dressing Change Due 06/29/19 6/30/2019  3:00 AM   Daily Line Review Performed 6/30/2019  7:10 AM   Number of days: 12            Peripheral IV - Single Lumen 06/27/19 0917 22 G Right Forearm (Active)   Site Assessment Clean;Dry;Intact;No redness;No swelling 6/30/2019  7:10 AM   Line Status Infusing 6/30/2019  7:10 AM   Dressing Status Clean;Dry;Intact 6/30/2019  7:10 AM   Dressing Intervention Dressing reinforced 6/29/2019  3:00 AM   Dressing Change Due 07/01/19 6/30/2019  3:00 AM   Site Change Due 07/01/19 6/29/2019  7:00 PM   Reason Not Rotated Not due 6/30/2019  7:10 AM   Number of  days: 3            Chest Tube 06/29/19 1655 1 Right Fourth intercostal space;Midaxillary 7 Fr. (Active)   Chest Tube WDL WDL 6/30/2019  7:10 AM   Function -20 cm H2O 6/30/2019  7:10 AM   Air Leak/Fluctuation air leak present 6/30/2019  7:10 AM   Safety all tubing connections taped;2 rubber-tipped hemostats w/ patient;all connections secured;suction checked 6/30/2019  7:10 AM   Securement tubing secured to body distal to insertion site w/ tape 6/30/2019  7:10 AM   Patency Intervention Tip/tilt 6/30/2019  7:10 AM   Drainage Description Serosanguineous 6/30/2019  7:10 AM   Dressing Appearance occlusive gauze dressing intact 6/30/2019  7:10 AM   Dressing Care dressing changed 6/30/2019 10:00 AM   Left Subcutaneous Emphysema none present 6/30/2019  7:10 AM   Right Subcutaneous Emphysema none present 6/30/2019  7:10 AM   Site Assessment Clean;Dry;Intact;No redness;No swelling;Ecchymotic 6/30/2019 10:00 AM   Surrounding Skin Dry;Intact 6/30/2019  7:10 AM   Output (mL) 0 mL 6/30/2019 10:00 AM   Number of days: 0            Y Chest Tube 3 and 4 06/18/19 1151 3 Left Pleural 19 Fr. 4 Left Mediastinal 19 Fr. (Active)   Function -20 cm H2O 6/30/2019  7:10 AM   Air Leak/Fluctuation air leak not present 6/30/2019  7:10 AM   Safety all tubing connections taped;2 rubber-tipped hemostats w/ patient;all connections secured;suction checked 6/30/2019  7:10 AM   Securement tubing secured to body distal to insertion site w/ tape 6/30/2019  7:10 AM   Left Subcutaneous Emphysema none present 6/30/2019  7:10 AM   Right Subcutaneous Emphysema none present 6/30/2019  7:10 AM   Patency Intervention Tip/tilt 6/30/2019  7:10 AM   Drainage Description 3 Serosanguineous 6/30/2019  7:10 AM   Tube 3 Dressing Appearance occlusive gauze dressing intact 6/30/2019  7:10 AM   Tube 3 Dressing Care dressing changed 6/30/2019 10:00 AM   Site Assessment 3 Clean;Dry;Intact 6/30/2019 10:00 AM   Surrounding Skin 3 Dry;Intact 6/30/2019  7:10 AM   Drainage  Description 4 Serosanguineous 6/30/2019  7:10 AM   Tube 4 Dressing Appearance occlusive gauze dressing intact 6/30/2019  7:10 AM   Tube 4 Dressing Care dressing changed 6/30/2019 10:00 AM   Site Assessment 4 Clean;Dry;Intact 6/30/2019 10:00 AM   Surrounding Skin 4 Dry;Intact 6/30/2019  7:10 AM   Output (mL) 0 mL 6/30/2019 10:00 AM   Number of days: 11       Significant Labs:  CBC:  Recent Labs   Lab 07/06/19 0615   WBC 6.03   RBC 2.96*   HGB 8.5*   HCT 27.7*   *   MCV 94   MCH 28.7   MCHC 30.7*     BMP:  Recent Labs   Lab 07/06/19 0615   *   K 5.0      CO2 19*   BUN 12   CREATININE 1.2   CALCIUM 7.8*      Tacrolimus Levels:  Recent Labs   Lab 07/06/19 0615   TACROLIMUS 6.2     Microbiology:  Microbiology Results (last 7 days)     Procedure Component Value Units Date/Time    Culture, Respiratory [[id number]] Collected:  07/05/19 1445    Order Status:  Completed Specimen:  Respiratory from Bronchial Wash Updated:  07/05/19 1802     Gram Stain (Respiratory) Few WBC's     Gram Stain (Respiratory) Few Gram positive rods     Gram Stain (Respiratory) Few budding yeast    Narrative:       Bronchial Wash    Fungus culture [[id number]] Collected:  07/05/19 1445    Order Status:  Sent Specimen:  Respiratory from Bronchial Wash Updated:  07/05/19 1505    AFB Culture & Smear [[id number]] Collected:  07/05/19 1445    Order Status:  Sent Specimen:  Respiratory from Bronchial Wash Updated:  07/05/19 1505    Fungus culture [[id number]] Collected:  06/20/19 0808    Order Status:  Completed Specimen:  Respiratory from Bronchial Wash Updated:  07/03/19 1000     Fungus (Mycology) Culture Culture in progress      No fungus isolated after 2 weeks          I have reviewed all pertinent labs within the past 24 hours.    Diagnostic Results:  Chest X-Ray: Vascular catheters, right pleural drain and monitoring leads remain.  There is some high-density material in the upper abdomen likely from the modified barium swallow.   Heart is enlarged there is mild increase in the pulmonary vascular markings.  Persistent parenchymal opacification bilaterally.  No significant pneumothorax seen.  Probable right pleural effusion.

## 2019-07-06 NOTE — PROGRESS NOTES
07/06/19 0027   Vital Signs   Temp (!) 101.3 °F (38.5 °C)   Temp src Oral   Pulse (!) 130   Resp 20   SpO2 (!) 90 %   O2 Device (Oxygen Therapy) room air   /75   BP Location Right arm   BP Method Automatic   Patient Position Lying     MD on call for LUTX notified of above VS.  MD stated HR possibly due to pain & to keep an eye on it for now. BP stable.  Pt was under heavy fuzzy blanket with heating pad.   Heating pad and heavy blanket removed thermostat lowered in pt room.

## 2019-07-06 NOTE — ASSESSMENT & PLAN NOTE
CMV D-/R+. Continue OIP with valganciclovir and dapsone. Bronch wash 6/20 and 6/23 with  Pseudomonas and Candida Glabrata. Vancomycin discontinued. Continue ciprofloxacin 400 mg Q8hrs. Fluconazole d/c today. Micafungin started.

## 2019-07-06 NOTE — PROGRESS NOTES
"[facility]  Endocrinology  Progress Note    Admit Date: 2019     Reason for Consult: Management of  Hyperglycemia and CF related pancreatic insufficiency.     Surgical Procedure and Date: lung transplant in ; 19    HPI:   Patient is a 29 y.o. female with a diagnosis of CF, HTN, migraine headache, and osteopenia. 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 No personal history of DM. Endocrinology consulted for BG management.     Lab Results   Component Value Date    HGBA1C 4.7 2019                 Interval HPI:   Overnight events: Remains in TSU. NAEON. BG at or above goal with scheduled insulin and correction scale coverage. PO intake improving. Prednisone 20 mg daily; steroid taper per primary.   Eatin%   Nausea: No  Hypoglycemia and intervention: No  Fever: No  TPN and/or TF: No      /73 (BP Location: Right arm, Patient Position: Lying)   Pulse 100   Temp 97.7 °F (36.5 °C) (Oral)   Resp 14   Ht 5' 1" (1.549 m)   Wt 65.7 kg (144 lb 13.5 oz)   LMP 10/02/2016   SpO2 (!) 91%   Breastfeeding? No   BMI 27.37 kg/m²      Labs Reviewed and Include    Recent Labs   Lab 19  0615   *   CALCIUM 7.8*   ALBUMIN 2.2*   PROT 5.0*   *   K 5.0   CO2 19*      BUN 12   CREATININE 1.2   ALKPHOS 146*   ALT 38   AST 20   BILITOT 0.7     Lab Results   Component Value Date    WBC 6.03 2019    HGB 8.5 (L) 2019    HCT 27.7 (L) 2019    MCV 94 2019     (L) 2019     No results for input(s): TSH, FREET4 in the last 168 hours.  Lab Results   Component Value Date    HGBA1C 4.7 2019       Nutritional status:   Body mass index is 27.37 kg/m².  Lab Results   Component Value Date    ALBUMIN 2.2 (L) 2019    ALBUMIN 2.2 (L) 2019    ALBUMIN 2.3 (L) 2019     Lab Results   Component Value Date    PREALBUMIN 15 (L) 2014    PREALBUMIN 11 (L) 2014    " PREALBUMIN 18 (L) 03/19/2014       Estimated Creatinine Clearance: 59.5 mL/min (based on SCr of 1.2 mg/dL).    Accu-Checks  Recent Labs     07/03/19  1739 07/03/19  2117 07/04/19  0842 07/04/19  1241 07/04/19  1809 07/05/19  0138 07/05/19  0902 07/05/19  1316 07/05/19  1459 07/05/19  1751   POCTGLUCOSE 268* 158* 138* 267* 246* 218* 107 141* 211* 123*       Current Medications and/or Treatments Impacting Glycemic Control  Immunotherapy:    Immunosuppressants         Stop Route Frequency     tacrolimus capsule 0.5 mg      -- SL 2 times daily     mycophenolate (CELLCEPT) 500 mg in dextrose 5 % 100 mL IVPB      -- IV 2 times daily        Steroids:   Hormones (From admission, onward)    Start     Stop Route Frequency Ordered    07/02/19 0900  predniSONE tablet 20 mg      -- Oral Daily 07/01/19 1148    06/26/19 1827  dexAMETHasone sodium phos (PF) 10 mg/mL injection     Note to Pharmacy:  Created by cabinet override    06/27 0629   06/26/19 1827        Pressors:    Autonomic Drugs (From admission, onward)    None        Hyperglycemia/Diabetes Medications:   Antihyperglycemics (From admission, onward)    Start     Stop Route Frequency Ordered    07/06/19 0815  insulin aspart U-100 pen 4-6 Units      -- SubQ 3 times daily with meals 07/06/19 0804    07/02/19 1543  insulin aspart U-100 pen 1-10 Units      -- SubQ Before meals & nightly PRN 07/02/19 1443          ASSESSMENT and PLAN    * S/P lung transplant  Managed per LUT.   avoid hypoglycemia        Hyperglycemia  BG goal 140 - 180.     increase novolog 4-6 units with meals.   Start novolog 3 units snack dose for evenings.   Moderate dose correction scale  BG monitoring to AC/HS    Discharge planning: TBD    Adrenal cortical steroids causing adverse effect in therapeutic use  Glucocorticoids markedly increase blood glucose. Expect the steroid taper will help glucose control.         Prophylactic immunotherapy  May increase insulin resistance.       CF related Pancreatic  insufficiency  May impact BG.           Amy Zapata NP  Endocrinology  [facility]

## 2019-07-06 NOTE — PROGRESS NOTES
Temp up to 101.6 despite removing heating pad and heavy blanket and lowering temp in pt room.  BP remains stable. 's.   MD placed order for 1x dose of 650 mg Tylenol PO.

## 2019-07-06 NOTE — PLAN OF CARE
Problem: Adult Inpatient Plan of Care  Goal: Plan of Care Review  Outcome: Ongoing (interventions implemented as appropriate)    -Pt AAOx4 with  at the bedside. No caregiver at bedside overnight.  Pt transfers from bed to bedside commode and bedside chair with 1 assist.  -Pt spiked temp of 101.6. MD notified. Pt given 1x dose of tylenol. Temp came down to 97.7 F.   -Telemetry monitoring-ST HR up to 120's when moving.  -BP remained stable.  -Pt on room air overnight maintaining sats 90-93%.  -BG monitoring AC/HS. Pt given 3 units of ss coverage.  -Clam shell incision KENNETH with derma bond.  -R CT removed yesterday. Dressing CDI.  -PRN Ativan, Dilaudid, & Zofran given.  -Scheduled PO Morphine also given.   -Pt continues with c/o pain in chest aggravated by breathing, coughing, or talking.  -Contact precautions maintained for . IV Gancyclovir, IV Cipro, and IV Cellcept given.  -Pt remained free from falls or injury thus far. Bed is in low/ locked position, side rails are up x 2, call light is in reach. Will continue to monitor.

## 2019-07-06 NOTE — CARE UPDATE
Rapid Response Nurse Chart Check     Chart check completed, abnormal VS noted, bedside RNEllen contacted, no concerns   verbalized at this time, instructed to call [phone] for further concerns or assistance.

## 2019-07-06 NOTE — ASSESSMENT & PLAN NOTE
POD#18 s/p bilateral lung transplant (re-transplant) for CARLOS EDUARDO. 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 PGD 0. S/p bronch and extubated to [facility]NC on POD#2, but was re-intubated 6/21 for hypercapnic respiratory failure. Right pigtail placed for PTX on 6/29. CTS ADIS Carlotta aware of right chest tube output.    Continue PT/OT/. Continue scheduled nebs and IS/CPT. Continue immunosuppression and prophylaxis. OR bronchoscopy 7/5 - cultures pending but with NGTD. CXR PA and Lateral this afternoon.

## 2019-07-07 NOTE — PROGRESS NOTES
Pt returned from 2 view chest xray c/o pain. Pt given 0.75mg of PRN dilaudid around 7:30 (about an hour before going down for Xray). Pt reports that they made her stand for a long period of time.    Pt mother and aunt at the bedside.     Pt mother requesting this RN to call MD to ask for something additional for pain since next PRN dose not available until 11:30 PM. Dr. Riley contacted with request. MD stated okay to go ahead and give PRN dose early ( now ).     Pt still did not receive PRN dose until almost 4 hours after last given dose anyway since she was in bathroom for over 30 minutes with diarrhea before getting back in bed to be able to received IV pain med.    Pt is very weak. Pt able to readjust position in bed, but to transfer from bed to bedside commode or wheelchair, pt puts arms around RN's neck and shuffles feet until back of legs reach chair.     Pt mother supposed to stay with pt overnight since pt  is very tired, but pt told mother that she does not want her to stay with her.    Pt mother contacted pt  to inform him that she did not want her to stay with her and pt  stated that he would come to hospital to stay with her.    Pt  now at the bedside.

## 2019-07-07 NOTE — SUBJECTIVE & OBJECTIVE
"Subjective:     Interval History: No acute events overnight. Remains afebrile. Upon repeat rounds this AM, patient found to be sleeping heavily. Per communication with patient's mother, patient reportedly fell asleep while chewing dinner last night. PRN Dilaudid d/c today. Discussion with patient's mother with patient's permission about goals to move forward with a better pain regimen and increasing physical strength and time out of bed to prevent further atelectatic changes and to help mobilize secretions.     Few episodes of "meche like" diarrhea overnight and bowel incontinence with urination. Able to bring up yellow sputum this afternoon with suction. Patient awake this afternoon and updated on pain medication changes. Long discussion with patient and significant other about decisions as to why pain regimen was changed, and patient was encouraged to increase compliance with  deep breathing, IS use 10 times an hour, and remaining OOBTC for majority of the day. Discussed concerns of aspiration given her mental status yesterday afternoon following IV Dilaudid administration. Patient and s/o verbalize understanding and are in agreement.     Continuous Infusions:    Scheduled Meds:   acetylcysteine 200 mg/ml (20%)  4 mL Nebulization Q12H    ciprofloxacin  400 mg Intravenous Q8H    dapsone  100 mg Oral Daily    enoxaparin  40 mg Subcutaneous Daily    famotidine  20 mg Oral BID    gabapentin  300 mg Oral TID    insulin aspart U-100  4-6 Units Subcutaneous TIDWM    levalbuterol  1.25 mg Nebulization Q6H WAKE    lidocaine  1 patch Transdermal Q24H    lidocaine  1 patch Transdermal Q24H    lipase-protease-amylase 24,000-76,000-120,000 units  4 capsule Oral TID WM    metoprolol tartrate  25 mg Oral TID    micafungin (MYCAMINE) IVPB  100 mg Intravenous Q24H    morphine  15 mg Oral Q6H    mycophenolate  500 mg Oral BID    predniSONE  20 mg Oral Daily    QUEtiapine  25 mg Oral Daily    QUEtiapine  50 mg Oral " QHS    sodium chloride 7%  4 mL Nebulization Q12H    tacrolimus  0.5 mg Sublingual Daily    tacrolimus  1 mg Sublingual Daily    valGANciclovir  450 mg Oral BID     PRN Meds:sodium chloride, acetaminophen, bisacodyl, Dextrose 10% Bolus, Dextrose 10% Bolus, diphenhydrAMINE, glucagon (human recombinant), glucose, glucose, insulin aspart U-100, insulin aspart U-100, lactulose, lipase-protease-amylase 24,000-76,000-120,000 units, LORazepam, magnesium sulfate IVPB, metoclopramide HCl, metoprolol, naloxone, ondansetron, ondansetron, oxyCODONE, potassium chloride in water **AND** potassium chloride in water **AND** potassium chloride in water, promethazine (PHENERGAN) IVPB    Review of patient's allergies indicates:   Allergen Reactions    Albuterol Palpitations    Colistin Anaphylaxis    Vancomycin analogues      Infusion reaction that does not resolve with slowing  Pt. States she can tolerate it when given with 50 mg Benadryl and ran over 3 hours    Neupogen [filgrastim] Other (See Comments)     Ostealgia after five daily doses of 300 mcg.      Bactrim [sulfamethoxazole-trimethoprim] Hives    Ceftazidime Hives     Pt stated can tolerate cefapine not ceftazidime    Ceftazidime     Dronabinol Other (See Comments)     Mental changes/hallucinations    Haldol [haloperidol lactate] Other (See Comments)     Seizure like activity    Nsaids (non-steroidal anti-inflammatory drug)      Cannot have due to lung transplant    Adhesive Rash     Cloth tape- please use tegaderm or paper tape    Aztreonam Rash    Ciprofloxacin Nausea And Vomiting     Projectile N/V, per patient.  Unwilling to retry therapy.       Review of Systems   Constitutional: Negative for appetite change, chills, diaphoresis and fever.   HENT: Negative for congestion, postnasal drip, rhinorrhea, sinus pressure and sinus pain.    Respiratory: Positive for cough and shortness of breath. Negative for wheezing.    Cardiovascular: Positive for chest pain  (incision site). Negative for palpitations and leg swelling.   Gastrointestinal: Positive for diarrhea. Negative for abdominal distention, nausea and vomiting.   Genitourinary: Negative for decreased urine volume, difficulty urinating, dysuria and urgency.   Musculoskeletal: Positive for back pain (chronic). Negative for neck pain and neck stiffness.   Skin: Positive for pallor. Negative for rash and wound.   Allergic/Immunologic: Positive for immunocompromised state.   Neurological: Positive for weakness. Negative for dizziness, syncope and headaches.   Psychiatric/Behavioral: Negative for confusion. The patient is nervous/anxious.      Objective:   Physical Exam   Constitutional: She is oriented to person, place, and time. She appears well-developed and well-nourished. She is easily aroused. No distress.   HENT:   Head: Normocephalic and atraumatic.   Right Ear: External ear normal.   Left Ear: External ear normal.   Nose: Nose normal.   Eyes: Conjunctivae and EOM are normal. No scleral icterus.   Neck: Normal range of motion. Neck supple. No JVD present.   Cardiovascular: Normal rate, regular rhythm and normal heart sounds. Exam reveals no friction rub.   No murmur heard.  Pulmonary/Chest: Effort normal. No accessory muscle usage. No respiratory distress. She has decreased breath sounds in the right middle field, the right lower field and the left lower field. She has no wheezes. She has no rhonchi. She has no rales.   Abdominal: Soft. Bowel sounds are normal. She exhibits no distension. There is no tenderness.   Musculoskeletal: Normal range of motion. She exhibits no edema.   Neurological: She is alert, oriented to person, place, and time and easily aroused. No cranial nerve deficit.   Skin: Skin is warm and dry. No rash noted. She is not diaphoretic. No erythema.   Psychiatric: Her mood appears anxious.   Nursing note and vitals reviewed.        Vital Signs (Most Recent):  Temp: 97.8 °F (36.6 °C) (07/07/19  1152)  Pulse: 95 (07/07/19 1340)  Resp: (!) 22 (07/07/19 1340)  BP: 120/77 (07/07/19 1152)  SpO2: (!) 94 % (07/07/19 1340) Vital Signs (24h Range):  Temp:  [97.3 °F (36.3 °C)-98.6 °F (37 °C)] 97.8 °F (36.6 °C)  Pulse:  [74-95] 95  Resp:  [15-22] 22  SpO2:  [91 %-94 %] 94 %  BP: (111-135)/(72-88) 120/77     Weight: 65.7 kg (144 lb 13.5 oz)  Body mass index is 27.37 kg/m².      Intake/Output Summary (Last 24 hours) at 7/7/2019 1439  Last data filed at 7/7/2019 0830  Gross per 24 hour   Intake 580 ml   Output 1000 ml   Net -420 ml       Ventilator Data:          Hemodynamic Parameters:       Lines/Drains:       Introducer right internal jugular (Active)   Specific Qualities Capped 6/30/2019  7:10 AM   Dressing Status Clean;Dry;Biopatch in place;Intact 6/30/2019  7:10 AM   Dressing Intervention Dressing reinforced 6/29/2019  3:00 AM   Dressing Change Due 06/29/19 6/30/2019  3:00 AM   Daily Line Review Performed 6/30/2019  7:10 AM   Number of days:             Port A Cath Single Lumen 06/24/14 1200 right subclavian (Active)   Accessed by: Radha Lanza 6/24/2019  5:00 AM   Dressing Type Transparent 6/30/2019  7:10 AM   Dressing Status Biopatch in place;Clean;Dry;Intact 6/30/2019  7:10 AM   Dressing Intervention Dressing reinforced 6/29/2019  3:00 AM   Dressing Change Due 06/30/19 6/30/2019  3:00 AM   Line Status Infusing 6/30/2019  7:10 AM   Flush Performed Yes 6/30/2019  7:10 AM   Date to be Reflushed 06/18/19 6/29/2019  3:00 AM   Daily Line Review Performed 6/30/2019  7:10 AM   Type of Needle Nicole 6/30/2019  3:00 AM   Gauge 20 6/30/2019  3:00 AM   Needle Length 1 in 6/30/2019  3:00 AM   Needle Status Left in place 6/30/2019  3:00 AM   Needle Insertion Date 06/24/19 6/30/2019  3:00 AM   Needle Insertion Time 0500 6/26/2019  3:00 AM   Number of days: 1831            Percutaneous Central Line Insertion/Assessment - double lumen  06/18/19 0510 (Active)   Dressing biopatch in place;dressing dry and intact 6/30/2019  7:10  AM   Securement secured w/ sutures 6/30/2019  7:10 AM   Additional Site Signs no erythema;no warmth 6/30/2019  7:10 AM   Distal Patency/Care infusing 6/30/2019  7:10 AM   Proximal Patency/Care infusing 6/30/2019  7:10 AM   Waveform normal 6/30/2019  7:10 AM   Line Interventions line leveled/zeroed 6/30/2019  7:10 AM   Dressing Change Due 06/29/19 6/30/2019  3:00 AM   Daily Line Review Performed 6/30/2019  7:10 AM   Number of days: 12            Peripheral IV - Single Lumen 06/27/19 0917 22 G Right Forearm (Active)   Site Assessment Clean;Dry;Intact;No redness;No swelling 6/30/2019  7:10 AM   Line Status Infusing 6/30/2019  7:10 AM   Dressing Status Clean;Dry;Intact 6/30/2019  7:10 AM   Dressing Intervention Dressing reinforced 6/29/2019  3:00 AM   Dressing Change Due 07/01/19 6/30/2019  3:00 AM   Site Change Due 07/01/19 6/29/2019  7:00 PM   Reason Not Rotated Not due 6/30/2019  7:10 AM   Number of days: 3            Chest Tube 06/29/19 1655 1 Right Fourth intercostal space;Midaxillary 7 Fr. (Active)   Chest Tube WDL WDL 6/30/2019  7:10 AM   Function -20 cm H2O 6/30/2019  7:10 AM   Air Leak/Fluctuation air leak present 6/30/2019  7:10 AM   Safety all tubing connections taped;2 rubber-tipped hemostats w/ patient;all connections secured;suction checked 6/30/2019  7:10 AM   Securement tubing secured to body distal to insertion site w/ tape 6/30/2019  7:10 AM   Patency Intervention Tip/tilt 6/30/2019  7:10 AM   Drainage Description Serosanguineous 6/30/2019  7:10 AM   Dressing Appearance occlusive gauze dressing intact 6/30/2019  7:10 AM   Dressing Care dressing changed 6/30/2019 10:00 AM   Left Subcutaneous Emphysema none present 6/30/2019  7:10 AM   Right Subcutaneous Emphysema none present 6/30/2019  7:10 AM   Site Assessment Clean;Dry;Intact;No redness;No swelling;Ecchymotic 6/30/2019 10:00 AM   Surrounding Skin Dry;Intact 6/30/2019  7:10 AM   Output (mL) 0 mL 6/30/2019 10:00 AM   Number of days: 0            Y  Chest Tube 3 and 4 06/18/19 1151 3 Left Pleural 19 Fr. 4 Left Mediastinal 19 Fr. (Active)   Function -20 cm H2O 6/30/2019  7:10 AM   Air Leak/Fluctuation air leak not present 6/30/2019  7:10 AM   Safety all tubing connections taped;2 rubber-tipped hemostats w/ patient;all connections secured;suction checked 6/30/2019  7:10 AM   Securement tubing secured to body distal to insertion site w/ tape 6/30/2019  7:10 AM   Left Subcutaneous Emphysema none present 6/30/2019  7:10 AM   Right Subcutaneous Emphysema none present 6/30/2019  7:10 AM   Patency Intervention Tip/tilt 6/30/2019  7:10 AM   Drainage Description 3 Serosanguineous 6/30/2019  7:10 AM   Tube 3 Dressing Appearance occlusive gauze dressing intact 6/30/2019  7:10 AM   Tube 3 Dressing Care dressing changed 6/30/2019 10:00 AM   Site Assessment 3 Clean;Dry;Intact 6/30/2019 10:00 AM   Surrounding Skin 3 Dry;Intact 6/30/2019  7:10 AM   Drainage Description 4 Serosanguineous 6/30/2019  7:10 AM   Tube 4 Dressing Appearance occlusive gauze dressing intact 6/30/2019  7:10 AM   Tube 4 Dressing Care dressing changed 6/30/2019 10:00 AM   Site Assessment 4 Clean;Dry;Intact 6/30/2019 10:00 AM   Surrounding Skin 4 Dry;Intact 6/30/2019  7:10 AM   Output (mL) 0 mL 6/30/2019 10:00 AM   Number of days: 11       Significant Labs:  CBC:  Recent Labs   Lab 07/07/19  0556   WBC 5.27   RBC 2.92*   HGB 8.3*   HCT 26.6*   *   MCV 91   MCH 28.4   MCHC 31.2*     BMP:  Recent Labs   Lab 07/07/19  0556      K 4.5      CO2 20*   BUN 13   CREATININE 1.0   CALCIUM 8.3*      Tacrolimus Levels:  Recent Labs   Lab 07/07/19  0556   TACROLIMUS 9.0     Microbiology:  Microbiology Results (last 7 days)     Procedure Component Value Units Date/Time    Culture, Respiratory [[id number]]  (Abnormal) Collected:  07/05/19 1445    Order Status:  Completed Specimen:  Respiratory from Bronchial Wash Updated:  07/07/19 0859     Respiratory Culture GRAM NEGATIVE AUBREE  Moderate  Identification  and susceptibility pending  Normal respiratory anibal also present       Gram Stain (Respiratory) Few WBC's     Gram Stain (Respiratory) Few Gram positive rods     Gram Stain (Respiratory) Few budding yeast    Narrative:       Bronchial Wash    AFB Culture & Smear [[id number]] Collected:  07/05/19 1445    Order Status:  Completed Specimen:  Respiratory from Bronchial Wash Updated:  07/06/19 2127     AFB Culture & Smear Culture in progress    Narrative:       Bronchial Wash    Fungus culture [[id number]] Collected:  07/05/19 1445    Order Status:  Sent Specimen:  Respiratory from Bronchial Wash Updated:  07/05/19 1505    Fungus culture [[id number]] Collected:  06/20/19 0808    Order Status:  Completed Specimen:  Respiratory from Bronchial Wash Updated:  07/03/19 1000     Fungus (Mycology) Culture Culture in progress      No fungus isolated after 2 weeks          I have reviewed all pertinent labs within the past 24 hours.    Diagnostic Results:  Chest X-Ray: Vascular catheters, right pleural drain and monitoring leads remain.  There is some high-density material in the upper abdomen likely from the modified barium swallow.  Heart is enlarged there is mild increase in the pulmonary vascular markings.  Persistent parenchymal opacification bilaterally.  No significant pneumothorax seen.  Probable right pleural effusion.

## 2019-07-07 NOTE — ASSESSMENT & PLAN NOTE
BG goal 140 - 180.     Continue novolog 4-6 units with meals. Will monitor today and increase if continues with prandial excursions.   Continue novolog 3 units snack dose for evenings.   Moderate dose correction scale  BG monitoring to AC/HS    Discharge planning: [facility]

## 2019-07-07 NOTE — PROGRESS NOTES
"[facility]  Lung Transplant  Progress Note - Floor    Patient Name: Juanita Ibarra  MRN: [id number]  Admission Date: 6/17/2019  Hospital Length of Stay: 19 days  Post-Operative Day: 1851 (Lung), 19 (Lung)  Attending Physician: Francisca Morin DO  Primary Care Provider: Primary Doctor Beth     Subjective:     Interval History: No acute events overnight. Remains afebrile. Upon repeat rounds this AM, patient found to be sleeping heavily. Per communication with patient's mother, patient reportedly fell asleep while chewing dinner last night. PRN Dilaudid d/c today. Discussion with patient's mother with patient's permission about goals to move forward with a better pain regimen and increasing physical strength and time out of bed to prevent further atelectatic changes and to help mobilize secretions.     Few episodes of "meche like" diarrhea overnight and bowel incontinence with urination. Able to bring up yellow sputum this afternoon with suction. Patient awake this afternoon and updated on pain medication changes. Long discussion with patient and significant other about decisions as to why pain regimen was changed, and patient was encouraged to increase compliance with  deep breathing, IS use 10 times an hour, and remaining OOBTC for majority of the day. Discussed concerns of aspiration given her mental status yesterday afternoon following IV Dilaudid administration. Patient and s/o verbalize understanding and are in agreement.     Continuous Infusions:    Scheduled Meds:   acetylcysteine 200 mg/ml (20%)  4 mL Nebulization Q12H    ciprofloxacin  400 mg Intravenous Q8H    dapsone  100 mg Oral Daily    enoxaparin  40 mg Subcutaneous Daily    famotidine  20 mg Oral BID    gabapentin  300 mg Oral TID    insulin aspart U-100  4-6 Units Subcutaneous TIDWM    levalbuterol  1.25 mg Nebulization Q6H WAKE    lidocaine  1 patch Transdermal Q24H    lidocaine  1 patch Transdermal Q24H    " lipase-protease-amylase 24,000-76,000-120,000 units  4 capsule Oral TID WM    metoprolol tartrate  25 mg Oral TID    micafungin (MYCAMINE) IVPB  100 mg Intravenous Q24H    morphine  15 mg Oral Q6H    mycophenolate  500 mg Oral BID    predniSONE  20 mg Oral Daily    QUEtiapine  25 mg Oral Daily    QUEtiapine  50 mg Oral QHS    sodium chloride 7%  4 mL Nebulization Q12H    tacrolimus  0.5 mg Sublingual Daily    tacrolimus  1 mg Sublingual Daily    valGANciclovir  450 mg Oral BID     PRN Meds:sodium chloride, acetaminophen, bisacodyl, Dextrose 10% Bolus, Dextrose 10% Bolus, diphenhydrAMINE, glucagon (human recombinant), glucose, glucose, insulin aspart U-100, insulin aspart U-100, lactulose, lipase-protease-amylase 24,000-76,000-120,000 units, LORazepam, magnesium sulfate IVPB, metoclopramide HCl, metoprolol, naloxone, ondansetron, ondansetron, oxyCODONE, potassium chloride in water **AND** potassium chloride in water **AND** potassium chloride in water, promethazine (PHENERGAN) IVPB    Review of patient's allergies indicates:   Allergen Reactions    Albuterol Palpitations    Colistin Anaphylaxis    Vancomycin analogues      Infusion reaction that does not resolve with slowing  Pt. States she can tolerate it when given with 50 mg Benadryl and ran over 3 hours    Neupogen [filgrastim] Other (See Comments)     Ostealgia after five daily doses of 300 mcg.      Bactrim [sulfamethoxazole-trimethoprim] Hives    Ceftazidime Hives     Pt stated can tolerate cefapine not ceftazidime    Ceftazidime     Dronabinol Other (See Comments)     Mental changes/hallucinations    Haldol [haloperidol lactate] Other (See Comments)     Seizure like activity    Nsaids (non-steroidal anti-inflammatory drug)      Cannot have due to lung transplant    Adhesive Rash     Cloth tape- please use tegaderm or paper tape    Aztreonam Rash    Ciprofloxacin Nausea And Vomiting     Projectile N/V, per patient.  Unwilling to retry  therapy.       Review of Systems   Constitutional: Negative for appetite change, chills, diaphoresis and fever.   HENT: Negative for congestion, postnasal drip, rhinorrhea, sinus pressure and sinus pain.    Respiratory: Positive for cough and shortness of breath. Negative for wheezing.    Cardiovascular: Positive for chest pain (incision site). Negative for palpitations and leg swelling.   Gastrointestinal: Positive for diarrhea. Negative for abdominal distention, nausea and vomiting.   Genitourinary: Negative for decreased urine volume, difficulty urinating, dysuria and urgency.   Musculoskeletal: Positive for back pain (chronic). Negative for neck pain and neck stiffness.   Skin: Positive for pallor. Negative for rash and wound.   Allergic/Immunologic: Positive for immunocompromised state.   Neurological: Positive for weakness. Negative for dizziness, syncope and headaches.   Psychiatric/Behavioral: Negative for confusion. The patient is nervous/anxious.      Objective:   Physical Exam   Constitutional: She is oriented to person, place, and time. She appears well-developed and well-nourished. She is easily aroused. No distress.   HENT:   Head: Normocephalic and atraumatic.   Right Ear: External ear normal.   Left Ear: External ear normal.   Nose: Nose normal.   Eyes: Conjunctivae and EOM are normal. No scleral icterus.   Neck: Normal range of motion. Neck supple. No JVD present.   Cardiovascular: Normal rate, regular rhythm and normal heart sounds. Exam reveals no friction rub.   No murmur heard.  Pulmonary/Chest: Effort normal. No accessory muscle usage. No respiratory distress. She has decreased breath sounds in the right middle field, the right lower field and the left lower field. She has no wheezes. She has no rhonchi. She has no rales.   Abdominal: Soft. Bowel sounds are normal. She exhibits no distension. There is no tenderness.   Musculoskeletal: Normal range of motion. She exhibits no edema.    Neurological: She is alert, oriented to person, place, and time and easily aroused. No cranial nerve deficit.   Skin: Skin is warm and dry. No rash noted. She is not diaphoretic. No erythema.   Psychiatric: Her mood appears anxious.   Nursing note and vitals reviewed.        Vital Signs (Most Recent):  Temp: 97.8 °F (36.6 °C) (07/07/19 1152)  Pulse: 95 (07/07/19 1340)  Resp: (!) 22 (07/07/19 1340)  BP: 120/77 (07/07/19 1152)  SpO2: (!) 94 % (07/07/19 1340) Vital Signs (24h Range):  Temp:  [97.3 °F (36.3 °C)-98.6 °F (37 °C)] 97.8 °F (36.6 °C)  Pulse:  [74-95] 95  Resp:  [15-22] 22  SpO2:  [91 %-94 %] 94 %  BP: (111-135)/(72-88) 120/77     Weight: 65.7 kg (144 lb 13.5 oz)  Body mass index is 27.37 kg/m².      Intake/Output Summary (Last 24 hours) at 7/7/2019 1439  Last data filed at 7/7/2019 0830  Gross per 24 hour   Intake 580 ml   Output 1000 ml   Net -420 ml       Ventilator Data:          Hemodynamic Parameters:       Lines/Drains:       Introducer right internal jugular (Active)   Specific Qualities Capped 6/30/2019  7:10 AM   Dressing Status Clean;Dry;Biopatch in place;Intact 6/30/2019  7:10 AM   Dressing Intervention Dressing reinforced 6/29/2019  3:00 AM   Dressing Change Due 06/29/19 6/30/2019  3:00 AM   Daily Line Review Performed 6/30/2019  7:10 AM   Number of days:             Port A Cath Single Lumen 06/24/14 1200 right subclavian (Active)   Accessed by: Radha Lanza 6/24/2019  5:00 AM   Dressing Type Transparent 6/30/2019  7:10 AM   Dressing Status Biopatch in place;Clean;Dry;Intact 6/30/2019  7:10 AM   Dressing Intervention Dressing reinforced 6/29/2019  3:00 AM   Dressing Change Due 06/30/19 6/30/2019  3:00 AM   Line Status Infusing 6/30/2019  7:10 AM   Flush Performed Yes 6/30/2019  7:10 AM   Date to be Reflushed 06/18/19 6/29/2019  3:00 AM   Daily Line Review Performed 6/30/2019  7:10 AM   Type of Needle Nicole 6/30/2019  3:00 AM   Gauge 20 6/30/2019  3:00 AM   Needle Length 1 in 6/30/2019  3:00  AM   Needle Status Left in place 6/30/2019  3:00 AM   Needle Insertion Date 06/24/19 6/30/2019  3:00 AM   Needle Insertion Time 0500 6/26/2019  3:00 AM   Number of days: 1831            Percutaneous Central Line Insertion/Assessment - double lumen  06/18/19 0510 (Active)   Dressing biopatch in place;dressing dry and intact 6/30/2019  7:10 AM   Securement secured w/ sutures 6/30/2019  7:10 AM   Additional Site Signs no erythema;no warmth 6/30/2019  7:10 AM   Distal Patency/Care infusing 6/30/2019  7:10 AM   Proximal Patency/Care infusing 6/30/2019  7:10 AM   Waveform normal 6/30/2019  7:10 AM   Line Interventions line leveled/zeroed 6/30/2019  7:10 AM   Dressing Change Due 06/29/19 6/30/2019  3:00 AM   Daily Line Review Performed 6/30/2019  7:10 AM   Number of days: 12            Peripheral IV - Single Lumen 06/27/19 0917 22 G Right Forearm (Active)   Site Assessment Clean;Dry;Intact;No redness;No swelling 6/30/2019  7:10 AM   Line Status Infusing 6/30/2019  7:10 AM   Dressing Status Clean;Dry;Intact 6/30/2019  7:10 AM   Dressing Intervention Dressing reinforced 6/29/2019  3:00 AM   Dressing Change Due 07/01/19 6/30/2019  3:00 AM   Site Change Due 07/01/19 6/29/2019  7:00 PM   Reason Not Rotated Not due 6/30/2019  7:10 AM   Number of days: 3            Chest Tube 06/29/19 1655 1 Right Fourth intercostal space;Midaxillary 7 Fr. (Active)   Chest Tube WDL WDL 6/30/2019  7:10 AM   Function -20 cm H2O 6/30/2019  7:10 AM   Air Leak/Fluctuation air leak present 6/30/2019  7:10 AM   Safety all tubing connections taped;2 rubber-tipped hemostats w/ patient;all connections secured;suction checked 6/30/2019  7:10 AM   Securement tubing secured to body distal to insertion site w/ tape 6/30/2019  7:10 AM   Patency Intervention Tip/tilt 6/30/2019  7:10 AM   Drainage Description Serosanguineous 6/30/2019  7:10 AM   Dressing Appearance occlusive gauze dressing intact 6/30/2019  7:10 AM   Dressing Care dressing changed 6/30/2019 10:00  AM   Left Subcutaneous Emphysema none present 6/30/2019  7:10 AM   Right Subcutaneous Emphysema none present 6/30/2019  7:10 AM   Site Assessment Clean;Dry;Intact;No redness;No swelling;Ecchymotic 6/30/2019 10:00 AM   Surrounding Skin Dry;Intact 6/30/2019  7:10 AM   Output (mL) 0 mL 6/30/2019 10:00 AM   Number of days: 0            Y Chest Tube 3 and 4 06/18/19 1151 3 Left Pleural 19 Fr. 4 Left Mediastinal 19 Fr. (Active)   Function -20 cm H2O 6/30/2019  7:10 AM   Air Leak/Fluctuation air leak not present 6/30/2019  7:10 AM   Safety all tubing connections taped;2 rubber-tipped hemostats w/ patient;all connections secured;suction checked 6/30/2019  7:10 AM   Securement tubing secured to body distal to insertion site w/ tape 6/30/2019  7:10 AM   Left Subcutaneous Emphysema none present 6/30/2019  7:10 AM   Right Subcutaneous Emphysema none present 6/30/2019  7:10 AM   Patency Intervention Tip/tilt 6/30/2019  7:10 AM   Drainage Description 3 Serosanguineous 6/30/2019  7:10 AM   Tube 3 Dressing Appearance occlusive gauze dressing intact 6/30/2019  7:10 AM   Tube 3 Dressing Care dressing changed 6/30/2019 10:00 AM   Site Assessment 3 Clean;Dry;Intact 6/30/2019 10:00 AM   Surrounding Skin 3 Dry;Intact 6/30/2019  7:10 AM   Drainage Description 4 Serosanguineous 6/30/2019  7:10 AM   Tube 4 Dressing Appearance occlusive gauze dressing intact 6/30/2019  7:10 AM   Tube 4 Dressing Care dressing changed 6/30/2019 10:00 AM   Site Assessment 4 Clean;Dry;Intact 6/30/2019 10:00 AM   Surrounding Skin 4 Dry;Intact 6/30/2019  7:10 AM   Output (mL) 0 mL 6/30/2019 10:00 AM   Number of days: 11       Significant Labs:  CBC:  Recent Labs   Lab 07/07/19  0556   WBC 5.27   RBC 2.92*   HGB 8.3*   HCT 26.6*   *   MCV 91   MCH 28.4   MCHC 31.2*     BMP:  Recent Labs   Lab 07/07/19  0556      K 4.5      CO2 20*   BUN 13   CREATININE 1.0   CALCIUM 8.3*      Tacrolimus Levels:  Recent Labs   Lab 07/07/19  0556   TACROLIMUS 9.0      Microbiology:  Microbiology Results (last 7 days)     Procedure Component Value Units Date/Time    Culture, Respiratory [[id number]]  (Abnormal) Collected:  07/05/19 1445    Order Status:  Completed Specimen:  Respiratory from Bronchial Wash Updated:  07/07/19 0859     Respiratory Culture GRAM NEGATIVE AUBREE  Moderate  Identification and susceptibility pending  Normal respiratory anibal also present       Gram Stain (Respiratory) Few WBC's     Gram Stain (Respiratory) Few Gram positive rods     Gram Stain (Respiratory) Few budding yeast    Narrative:       Bronchial Wash    AFB Culture & Smear [[id number]] Collected:  07/05/19 1445    Order Status:  Completed Specimen:  Respiratory from Bronchial Wash Updated:  07/06/19 2127     AFB Culture & Smear Culture in progress    Narrative:       Bronchial Wash    Fungus culture [[id number]] Collected:  07/05/19 1445    Order Status:  Sent Specimen:  Respiratory from Bronchial Wash Updated:  07/05/19 1505    Fungus culture [[id number]] Collected:  06/20/19 0808    Order Status:  Completed Specimen:  Respiratory from Bronchial Wash Updated:  07/03/19 1000     Fungus (Mycology) Culture Culture in progress      No fungus isolated after 2 weeks          I have reviewed all pertinent labs within the past 24 hours.    Diagnostic Results:  Chest X-Ray: Vascular catheters, right pleural drain and monitoring leads remain.  There is some high-density material in the upper abdomen likely from the modified barium swallow.  Heart is enlarged there is mild increase in the pulmonary vascular markings.  Persistent parenchymal opacification bilaterally.  No significant pneumothorax seen.  Probable right pleural effusion.           Assessment/Plan:     * S/P lung transplant  POD#19 s/p bilateral lung transplant (re-transplant) for CARLOS EDUARDO. 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 PGD 0. S/p bronch and extubated to HFNC on POD#2, but was re-intubated 6/21 for hypercapnic respiratory failure. Right  pigtail placed for PTX on 6/29. CTS ADIS Carlotta aware of right chest tube output.    Continue PT/OT/. Continue scheduled nebs and IS/CPT. Continue immunosuppression and prophylaxis. OR bronchoscopy 7/5 - cultures with GNR. CXR PA and Lateral 7/6 with increasing opacification in the right middle to lower lung zone.    Immunosuppression  Previously on tacrolimus, MMF, and daily prednisone. S/p solumedrol in OR, basiliximab on POD#0 and POD#4. Continue MMF, tacrolimus, and steroid taper. Monitor daily tacrolimus levels and adjust dose as needed.       Prophylactic antibiotic  CMV D-/R+. Continue OIP with valganciclovir and dapsone. Bronch wash 6/20 and 6/23 with  Pseudomonas and Candida Glabrata. Vancomycin discontinued. Continue ciprofloxacin 400 mg Q8hrs. Continue micafungin.    Acute blood loss anemia  Received 6u PRBCs, 2u plts, 1 cryo, and 4 FFP angie-operatively. 1u PRBC on 7/5. H/H stable today.     Adrenal cortical steroids causing adverse effect in therapeutic use  Endocrinology consulted, appreciate recs.    CF related Pancreatic insufficiency  Continue Creon with meals and snacks.     Chronic pain with opiate use  Anesthesia consulted for pain mgmt. Continue with scheduled morphine Q6hrs. Dilaudid d/c. Oxy 15 mg IR Q4hrs PRN available.     Steroid-induced hyperglycemia  Endocrine following, appreciate recs.     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 ReceivedTobramycin x1 on 6/22. Continue ciprofloxacin. Follow up on bronchial wash cultures from 7/5.    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 Repeat BAL on 6/23 with Candida glabrata. Discussed with ID, Dr. Francis. Continue micafungin.        Jacqueline Dumont PAGaneshC  Lung Transplant  [facility]

## 2019-07-07 NOTE — ASSESSMENT & PLAN NOTE
POD#19 s/p bilateral lung transplant (re-transplant) for CARLOS EDUARDO. 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 PGD 0. S/p bronch and extubated to HFNC on POD#2, but was re-intubated 6/21 for hypercapnic respiratory failure. Right pigtail placed for PTX on 6/29. CTS ADIS Carlotta aware of right chest tube output.    Continue PT/OT/. Continue scheduled nebs and IS/CPT. Continue immunosuppression and prophylaxis. OR bronchoscopy 7/5 - cultures with GNR. CXR PA and Lateral 7/6 with increasing opacification in the right middle to lower lung zone.

## 2019-07-07 NOTE — PROGRESS NOTES
"[facility]  Endocrinology  Progress Note    Admit Date: 2019     Reason for Consult: Management of  Hyperglycemia and CF related pancreatic insufficiency.     Surgical Procedure and Date: lung transplant in ; 19    HPI:   Patient is a 29 y.o. female with a diagnosis of CF, HTN, migraine headache, and osteopenia. 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 No personal history of DM. Endocrinology consulted for BG management.     Lab Results   Component Value Date    HGBA1C 4.7 2019                 Interval HPI:   Overnight events: Remains in TSU. NAEON. BG above goal with scheduled insulin; required correction scale. Prednisone 20 mg; steroids per primary.   Eatin-50%   Nausea: No  Hypoglycemia and intervention: No  Fever: No  TPN and/or TF: No      /79 (BP Location: Right arm, Patient Position: Lying)   Pulse 82   Temp 97.3 °F (36.3 °C) (Oral)   Resp 18   Ht 5' 1" (1.549 m)   Wt 65.7 kg (144 lb 13.5 oz)   LMP 10/02/2016   SpO2 (!) 92%   Breastfeeding? No   BMI 27.37 kg/m²      Labs Reviewed and Include    Recent Labs   Lab 19  0556   *   CALCIUM 8.3*   ALBUMIN 2.3*   PROT 5.1*      K 4.5   CO2 20*      BUN 13   CREATININE 1.0   ALKPHOS 136*   ALT 29   AST 11   BILITOT 0.4     Lab Results   Component Value Date    WBC 5.27 2019    HGB 8.3 (L) 2019    HCT 26.6 (L) 2019    MCV 91 2019     (L) 2019     No results for input(s): TSH, FREET4 in the last 168 hours.  Lab Results   Component Value Date    HGBA1C 4.7 2019       Nutritional status:   Body mass index is 27.37 kg/m².  Lab Results   Component Value Date    ALBUMIN 2.3 (L) 2019    ALBUMIN 2.2 (L) 2019    ALBUMIN 2.2 (L) 2019     Lab Results   Component Value Date    PREALBUMIN 15 (L) 2014    PREALBUMIN 11 (L) 2014    PREALBUMIN 18 (L) 2014 "       Estimated Creatinine Clearance: 71.4 mL/min (based on SCr of 1 mg/dL).    Accu-Checks  Recent Labs     07/04/19  1809 07/05/19  0138 07/05/19  0902 07/05/19  1316 07/05/19  1459 07/05/19  1751 07/05/19  2158 07/06/19  0832 07/06/19  1408 07/06/19  2304   POCTGLUCOSE 246* 218* 107 141* 211* 123* 286* 242* 122* 305*       Current Medications and/or Treatments Impacting Glycemic Control  Immunotherapy:    Immunosuppressants         Stop Route Frequency     tacrolimus capsule 1 mg      -- SL Daily     mycophenolate capsule 500 mg      -- Oral 2 times daily     tacrolimus capsule 0.5 mg      -- SL Daily        Steroids:   Hormones (From admission, onward)    Start     Stop Route Frequency Ordered    07/02/19 0900  predniSONE tablet 20 mg      -- Oral Daily 07/01/19 1148    06/26/19 1827  dexAMETHasone sodium phos (PF) 10 mg/mL injection     Note to Pharmacy:  Created by cabinet override    06/27 0629   06/26/19 1827        Pressors:    Autonomic Drugs (From admission, onward)    None        Hyperglycemia/Diabetes Medications:   Antihyperglycemics (From admission, onward)    Start     Stop Route Frequency Ordered    07/06/19 1135  insulin aspart U-100 pen 3 Units      -- SubQ As needed (PRN) 07/06/19 1036    07/06/19 0815  insulin aspart U-100 pen 4-6 Units      -- SubQ 3 times daily with meals 07/06/19 0804    07/02/19 1543  insulin aspart U-100 pen 1-10 Units      -- SubQ Before meals & nightly PRN 07/02/19 1443          ASSESSMENT and PLAN    * S/P lung transplant  Managed per LUT.   avoid hypoglycemia        Hyperglycemia  BG goal 140 - 180.     Continue novolog 4-6 units with meals. Will monitor today and increase if continues with prandial excursions.   Continue novolog 3 units snack dose for evenings.   Moderate dose correction scale  BG monitoring to AC/HS    Discharge planning: TBD    Adrenal cortical steroids causing adverse effect in therapeutic use  Glucocorticoids markedly increase blood glucose. Expect  the steroid taper will help glucose control.         Prophylactic immunotherapy  May increase insulin resistance.       CF related Pancreatic insufficiency  May impact BG.           Amy Zapata NP  Endocrinology  [facility]

## 2019-07-07 NOTE — SUBJECTIVE & OBJECTIVE
"Interval HPI:   Overnight events: Remains in TSU. NAEON. BG above goal with scheduled insulin; required correction scale. Prednisone 20 mg; steroids per primary.   Eatin-50%   Nausea: No  Hypoglycemia and intervention: No  Fever: No  TPN and/or TF: No      /79 (BP Location: Right arm, Patient Position: Lying)   Pulse 82   Temp 97.3 °F (36.3 °C) (Oral)   Resp 18   Ht 5' 1" (1.549 m)   Wt 65.7 kg (144 lb 13.5 oz)   LMP 10/02/2016   SpO2 (!) 92%   Breastfeeding? No   BMI 27.37 kg/m²     Labs Reviewed and Include    Recent Labs   Lab 19  0556   *   CALCIUM 8.3*   ALBUMIN 2.3*   PROT 5.1*      K 4.5   CO2 20*      BUN 13   CREATININE 1.0   ALKPHOS 136*   ALT 29   AST 11   BILITOT 0.4     Lab Results   Component Value Date    WBC 5.27 2019    HGB 8.3 (L) 2019    HCT 26.6 (L) 2019    MCV 91 2019     (L) 2019     No results for input(s): TSH, FREET4 in the last 168 hours.  Lab Results   Component Value Date    HGBA1C 4.7 2019       Nutritional status:   Body mass index is 27.37 kg/m².  Lab Results   Component Value Date    ALBUMIN 2.3 (L) 2019    ALBUMIN 2.2 (L) 2019    ALBUMIN 2.2 (L) 2019     Lab Results   Component Value Date    PREALBUMIN 15 (L) 2014    PREALBUMIN 11 (L) 2014    PREALBUMIN 18 (L) 2014       Estimated Creatinine Clearance: 71.4 mL/min (based on SCr of 1 mg/dL).    Accu-Checks  Recent Labs     19  1809 19  0138 19  0902 19  1316 19  1459 19  1751 19  2158 19  0832 19  1408 19  2304   POCTGLUCOSE 246* 218* 107 141* 211* 123* 286* 242* 122* 305*       Current Medications and/or Treatments Impacting Glycemic Control  Immunotherapy:    Immunosuppressants         Stop Route Frequency     tacrolimus capsule 1 mg      -- SL Daily     mycophenolate capsule 500 mg      -- Oral 2 times daily     tacrolimus capsule 0.5 mg      -- SL " Daily        Steroids:   Hormones (From admission, onward)    Start     Stop Route Frequency Ordered    07/02/19 0900  predniSONE tablet 20 mg      -- Oral Daily 07/01/19 1148    06/26/19 1827  dexAMETHasone sodium phos (PF) 10 mg/mL injection     Note to Pharmacy:  Created by cabinet override    06/27 0629 06/26/19 1827        Pressors:    Autonomic Drugs (From admission, onward)    None        Hyperglycemia/Diabetes Medications:   Antihyperglycemics (From admission, onward)    Start     Stop Route Frequency Ordered    07/06/19 1135  insulin aspart U-100 pen 3 Units      -- SubQ As needed (PRN) 07/06/19 1036    07/06/19 0815  insulin aspart U-100 pen 4-6 Units      -- SubQ 3 times daily with meals 07/06/19 0804    07/02/19 1543  insulin aspart U-100 pen 1-10 Units      -- SubQ Before meals & nightly PRN 07/02/19 1443

## 2019-07-07 NOTE — PROGRESS NOTES
Pt c/o diarrhea. Pt requesting contact MD to request something to stop diarrhea.   MD stated don't want to give anything to stop diarrhea in case it is infectious.  Pt has Miralax ordered but has not been taking it. MD ordered to DC any ordered laxatives or stool softeners at this time.

## 2019-07-07 NOTE — PLAN OF CARE
Problem: Adult Inpatient Plan of Care  Goal: Plan of Care Review  Outcome: Ongoing (interventions implemented as appropriate)    -Pt AAOx4 with  at the bedside.  -No major changes or AEON.  - Pt c/o having BMs without trying when she urinates.   -Pt is weak. Pt transferred from bed to wheelchair with 1 person assist then wheeled to the bathroom and transferred from wheelchair to toilet.  -Pills given in either apple sauce, Ice cream, or pudding.  - 2 view chest xray done last night.   -Telemetry monitoring-NSR with HR 70's-80's.  -BP remained stable.  -Pt on room air overnight maintaining sats 91-94%.  -BG monitoring AC/HS. Pt given 4 units of ss coverage for .  -Clam shell incision KENNETH with derma bond. CDI.  -Old CT site CDI.  -PRN Ativan, Dilaudid, & Zofran given.  -Scheduled PO Morphine also given. Midnight dose held.  -Contact precautions maintained for . IV Cipro given.   -Pt remained free from falls or injury thus far. Bed is in low/ locked position, side rails are up x 2, call light is in reach.   Will continue to monitor.

## 2019-07-07 NOTE — ASSESSMENT & PLAN NOTE
Anesthesia consulted for pain mgmt. Continue with scheduled morphine Q6hrs. Dilaudid d/c. Oxy 15 mg IR Q4hrs PRN available.

## 2019-07-07 NOTE — ASSESSMENT & PLAN NOTE
BG goal 140 - 180    Continue Novolog 4-6 units with meals - monitor for prandial excursions once appetite improves  Continue Novolog 3 units snack dose for evenings.   Moderate dose correction scale  BG monitoring AC/HS    Discharge planning: SAURABH

## 2019-07-07 NOTE — CARE UPDATE
Pt refused tx at this time as she is having diarrhea and stomach pain and will be on the toilet for quite some time

## 2019-07-07 NOTE — ASSESSMENT & PLAN NOTE
History of Candida infections with initial transplant previously treated with micafungin in 05/2018. Repeat BAL on 6/23 with Candida glabrata. Discussed with ID, Dr. Francis. Continue micafungin.

## 2019-07-08 NOTE — ASSESSMENT & PLAN NOTE
POD#20 s/p bilateral lung transplant (re-transplant) for CARLOS EDUARDO. 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 PGD 0. S/p bronch and extubated to HFNC on POD#2, but was re-intubated 6/21 for hypercapnic respiratory failure. Right pigtail placed for PTX on 6/29. CTS ADIS Carlotta aware of right chest tube output.    Continue PT/OT. Continue scheduled nebs and IS/CPT. Continue immunosuppression and prophylaxis. OR bronchoscopy 7/5 - cultures with GNR. CXR PA and Lateral 7/6 with increasing opacification in the right middle to lower lung zone.

## 2019-07-08 NOTE — CARE UPDATE
BG goal 140 - 180. BG well controlled overnight with scheduled insulin and prn correction scale.     Continue novolog 4-6 units with meals.   Continue novolog 3 units snack dose for evenings.   Moderate dose correction scale  BG monitoring to AC/HS    ** Please call Endocrine for any BG related issues **

## 2019-07-08 NOTE — ASSESSMENT & PLAN NOTE
CMV D-/R+. Continue OIP with valganciclovir, fluconazole, and dapsone. Bronch wash 6/20 and 6/23 with  Pseudomonas and Candida Glabrata. Vancomycin discontinued. Continue ciprofloxacin 400 mg Q8hrs. Continue micafungin.

## 2019-07-08 NOTE — PLAN OF CARE
Problem: Occupational Therapy Goal  Goal: Occupational Therapy Goal  Goals to be met by: 7/4/19     Patient will increase functional independence with ADLs by performing:    UE Dressing with Supervision.  LE Dressing with Supervision. - Met on 7/8 with socks   Grooming while standing at sink with Supervision.  Toileting from toilet with Supervision for hygiene and clothing management. - Progressing   Toilet transfer to toilet with Supervision.- Progressing      Outcome: Ongoing (interventions implemented as appropriate)  Pt progressing fairly well in therapy; however continues to fatigue quickly with minimal activity. Pt requires increased time due to her generalized weakness and impaired endurance. Pt's decreased activity tolerance affects her pace and quality of movements during functional activities. Pt tolerated session fairly well demonstrating the ability to complete transfers with min- mod A and functional mobility (3 steps toward restroom) with min A and RW. Pt with improved ADL performance requiring SPV for BLE dressing and min A for toileting. Pt's spouse present and active in pt's POC. Pt will continue to benefit from skilled OT to prevent further debility and ensure a safe return home.     Comments: Ann Hood OTR/L

## 2019-07-08 NOTE — PROGRESS NOTES
"[facility]  Adult Nutrition  Progress Note    SUMMARY       Recommendations    Recommendation/Intervention: 1.) Continue current diet ordered by SLP. 2.) Daily weights.   Goals: 1.) Pt to consume/tolerate >75% EEN and EPN by follow up.  Nutrition Goal Status: progressing towards goal  Communication of RD Recs: (POC)    Reason for Assessment    Reason For Assessment: RD follow-up  Diagnosis: transplant/postoperative complications(s/p redo BLTx 6/18)  Relevant Medical History: CF s/p BLTx 6/2014, HTN, seizures  Interdisciplinary Rounds: did not attend  General Information Comments: Pt currently in restroom wirh PT during visit, spouse to bedside. Spouse reports pt appetite is fair. He reports pt consumed [employer] 7/7 and ate ~100% of sandwich. He reports pt not consuming many liquids. He reports beringing protein shakes from home. Instructed spouse to notify SLP for consistency to prevent aspiration. He reports +diarrhea x 3 days; cultures taken. Pt with chronic nausea. No other GI distress. NFPE unable to assessed at this time. Pt continues to appear well nourished. Wt remains stable.   Nutrition Discharge Planning: Post transplant nutrition education provided. Food safety/drug interactions emphasized. General healthy/low salt diet recommended. RD name/contact information, education material left.  No other needs identified. Caregiver present.      Nutrition Risk Screen    Nutrition Risk Screen: dysphagia or difficulty swallowing    Nutrition/Diet History    Spiritual, Cultural Beliefs, [employer]alism Practices, Values that Affect Care: no  Factors Affecting Nutritional Intake: NPO, difficulty/impaired swallowing    Anthropometrics    Temp: 99.1 °F (37.3 °C)  Height Method: Stated  Height: 5' 1" (154.9 cm)  Height (inches): 61 in  Weight Method: Bed Scale  Weight: 65.7 kg (144 lb 13.5 oz)  Weight (lb): 144.84 lb  Ideal Body Weight (IBW), Female: 105 lb  % Ideal Body Weight, Female (lb): 123.77 " lb  BMI (Calculated): 24.6  BMI Grade: 18.5-24.9 - normal       Lab/Procedures/Meds    Pertinent Labs Reviewed: reviewed  Pertinent Labs Comments: GFR 55.2, Glu 168, Ca 8.0  Pertinent Medications Reviewed: reviewed  Pertinent Medications Comments: cipro, famotidine, morphine, mycophenolate, prednisone, tacrolimus      Estimated/Assessed Needs    Weight Used For Calorie Calculations: 65.7 kg (144 lb 13.5 oz)  Energy Calorie Requirements (kcal): 1643  Energy Need Method: Walnut-St Jeor(x 1.25 PAL)  Protein Requirements: 79-99(g/day)  Weight Used For Protein Calculations: 65.7 kg (144 lb 13.5 oz)(1.2-1.5 g/kg)  Fluid Requirements (mL): 1 mL/kcal or per MD  Estimated Fluid Requirement Method: RDA Method(or per MD)  RDA Method (mL): 1643         Nutrition Prescription Ordered    Current Diet Order: mechanical soft  Nutrition Order Comments: nectar thick liquids  Current Nutrition Support Formula Ordered: (-)  Current Nutrition Support Rate Ordered: 0 (ml)  Current Nutrition Support Frequency Ordered: -    Evaluation of Received Nutrient/Fluid Intake    Enteral Calories (kcal): 0  Enteral Protein (gm): 0  Enteral (Free Water) Fluid (mL): 0  Other Calories (kcal): 0  Total Calories (kcal): 0  % Kcal Needs: 0  % Protein Needs: 0  I/O: -0.8L since 6/24  Energy Calories Required: (-)  Protein Required: (-)  Fluid Required: (-)  Comments: LBM 7/7  Tolerance: (-)  % Intake of Estimated Energy Needs: 25 - 50 %  % Meal Intake: 50 - 75 %    Nutrition Risk    Level of Risk/Frequency of Follow-up: low     Assessment and Plan  Nutrition Problem  Increased nutrient needs  Swallowing difficulty     Related to (etiology):   Physiological causes related to healing  Mechanical causes from surgery     Signs and Symptoms (as evidenced by):   S/p redo BLTx 6/18   Abnormal findings from barium swallow evaluation     Interventions/Recommendations (treatment strategy):  Collaboration and referral of nutrition care     Nutrition Diagnosis  Status:   Continues   Progressing     Nutrition Problem  Inadequate energy intake     Related to (etiology):   Decreased ability to consume sufficient energy     Signs and Symptoms (as evidenced by):   NPO with no alternative means of nutrition at this time     Interventions/Recommendations (treatment strategy):  Collaboration of nutrition care with other providers     Nutrition Diagnosis Status:   Resolved       Monitor and Evaluation    Food and Nutrient Intake: energy intake, food and beverage intake  Food and Nutrient Adminstration: diet order  Knowledge/Beliefs/Attitudes: food and nutrition knowledge/skill  Physical Activity and Function: nutrition-related ADLs and IADLs  Anthropometric Measurements: weight, weight change, body mass index  Biochemical Data, Medical Tests and Procedures: electrolyte and renal panel, gastrointestinal profile, glucose/endocrine profile, inflammatory profile, lipid profile  Nutrition-Focused Physical Findings: overall appearance     Malnutrition Assessment  Unable to assess at this time. Will attempt at follow up.     Nutrition Follow-Up    RD Follow-up?: Yes

## 2019-07-08 NOTE — PLAN OF CARE
Problem: Physical Therapy Goal  Goal: Physical Therapy Goal  Goals to be met by: 19    Patient will increase functional independence with mobility by performin. Supine to sit with Stand-by Assistance.  2. Sit to stand transfer with Tonio.  3. Pt to perform stand pivot transfer with Tonio, with use of RW.  4. Gait  x 20 feet with Tonio, with use of RW.  5.  Pt to perform and be compliant with exercise program in sit, to reduce complication s/p surgery.    Goals remain appropriate. Continue with Physical therapy Plan of Care. Elana De Anda, PT 2019

## 2019-07-08 NOTE — SUBJECTIVE & OBJECTIVE
Subjective:     Interval History: No acute events overnight. Remains afebrile and RA with sats >90%. Remains lethargic with delayed responses but still requesting pain medications. Increasing edema of right foot/ankle. Plan for BLE ultrasound. Continues to be able to intermittently clear secretions. Significant other bringing food from outside. Discussed at length on Saturday the importance of abiding by SLP recommendations for a dental soft/nectar thick diet to avoid any risk of aspiration. Discussed with patient that she should only be eating meals when she is fully awake and sitting upright. Patient and s/o verbalize understanding.      Continuous Infusions:    Scheduled Meds:   acetylcysteine 200 mg/ml (20%)  4 mL Nebulization Q12H    ciprofloxacin  400 mg Intravenous Q8H    dapsone  100 mg Oral Daily    enoxaparin  40 mg Subcutaneous Daily    famotidine  20 mg Oral BID    fluconazole  400 mg Oral Daily    gabapentin  300 mg Oral TID    insulin aspart U-100  4-6 Units Subcutaneous TIDWM    levalbuterol  1.25 mg Nebulization Q6H WAKE    lidocaine  1 patch Transdermal Q24H    lidocaine  1 patch Transdermal Q24H    lipase-protease-amylase 24,000-76,000-120,000 units  4 capsule Oral TID WM    metoprolol tartrate  25 mg Oral TID    micafungin (MYCAMINE) IVPB  100 mg Intravenous Q24H    morphine  15 mg Oral Q8H    mycophenolate  500 mg Oral BID    predniSONE  20 mg Oral Daily    QUEtiapine  50 mg Oral QHS    sodium chloride 7%  4 mL Nebulization Q12H    tacrolimus  0.5 mg Oral Daily    [START ON 7/9/2019] tacrolimus  1 mg Oral Daily    valGANciclovir  450 mg Oral BID    venlafaxine  150 mg Oral QHS    venlafaxine  37.5 mg Oral QHS     PRN Meds:sodium chloride, acetaminophen, bisacodyl, Dextrose 10% Bolus, Dextrose 10% Bolus, diphenhydrAMINE, glucagon (human recombinant), glucose, glucose, insulin aspart U-100, insulin aspart U-100, lactulose, lipase-protease-amylase 24,000-76,000-120,000 units,  LORazepam, magnesium sulfate IVPB, metoclopramide HCl, metoprolol, naloxone, ondansetron, ondansetron, oxyCODONE, potassium chloride in water **AND** potassium chloride in water **AND** potassium chloride in water, promethazine (PHENERGAN) IVPB    Review of patient's allergies indicates:   Allergen Reactions    Albuterol Palpitations    Colistin Anaphylaxis    Vancomycin analogues      Infusion reaction that does not resolve with slowing  Pt. States she can tolerate it when given with 50 mg Benadryl and ran over 3 hours    Neupogen [filgrastim] Other (See Comments)     Ostealgia after five daily doses of 300 mcg.      Bactrim [sulfamethoxazole-trimethoprim] Hives    Ceftazidime Hives     Pt stated can tolerate cefapine not ceftazidime    Ceftazidime     Dronabinol Other (See Comments)     Mental changes/hallucinations    Haldol [haloperidol lactate] Other (See Comments)     Seizure like activity    Nsaids (non-steroidal anti-inflammatory drug)      Cannot have due to lung transplant    Adhesive Rash     Cloth tape- please use tegaderm or paper tape    Aztreonam Rash    Ciprofloxacin Nausea And Vomiting     Projectile N/V, per patient.  Unwilling to retry therapy.       Review of Systems   Constitutional: Negative for appetite change, chills, diaphoresis and fever.   HENT: Negative for congestion, postnasal drip, rhinorrhea, sinus pressure and sinus pain.    Respiratory: Positive for cough and shortness of breath. Negative for wheezing.    Cardiovascular: Positive for chest pain (incision site). Negative for palpitations and leg swelling.   Gastrointestinal: Positive for diarrhea. Negative for abdominal distention, nausea and vomiting.   Genitourinary: Negative for decreased urine volume, difficulty urinating, dysuria and urgency.   Musculoskeletal: Positive for back pain (chronic). Negative for neck pain and neck stiffness.   Skin: Positive for pallor. Negative for rash and wound.    Allergic/Immunologic: Positive for immunocompromised state.   Neurological: Positive for weakness. Negative for dizziness, syncope and headaches.   Psychiatric/Behavioral: Negative for confusion. The patient is nervous/anxious.      Objective:   Physical Exam   Constitutional: She is oriented to person, place, and time. She appears well-developed and well-nourished. She is easily aroused. No distress.   HENT:   Head: Normocephalic and atraumatic.   Right Ear: External ear normal.   Left Ear: External ear normal.   Nose: Nose normal.   Eyes: Conjunctivae and EOM are normal. No scleral icterus.   Neck: Normal range of motion. Neck supple. No JVD present.   Cardiovascular: Normal rate, regular rhythm and normal heart sounds. Exam reveals no friction rub.   No murmur heard.  Pulmonary/Chest: Effort normal. No accessory muscle usage. No respiratory distress. She has decreased breath sounds in the right middle field, the right lower field and the left lower field. She has no wheezes. She has no rhonchi. She has no rales.   Abdominal: Soft. Bowel sounds are normal. She exhibits no distension. There is no tenderness.   Musculoskeletal: Normal range of motion. She exhibits edema (RLE to ankle).   Neurological: She is alert, oriented to person, place, and time and easily aroused. No cranial nerve deficit.   Skin: Skin is warm and dry. No rash noted. She is not diaphoretic. No erythema.   Psychiatric: Her mood appears anxious.   Nursing note and vitals reviewed.        Vital Signs (Most Recent):  Temp: 99.1 °F (37.3 °C) (07/08/19 1200)  Pulse: 86 (07/08/19 1200)  Resp: 12 (07/08/19 1200)  BP: (!) 134/93 (07/08/19 1200)  SpO2: (!) 92 % (07/08/19 1200) Vital Signs (24h Range):  Temp:  [98 °F (36.7 °C)-99.7 °F (37.6 °C)] 99.1 °F (37.3 °C)  Pulse:  [] 86  Resp:  [12-22] 12  SpO2:  [91 %-95 %] 92 %  BP: (110-135)/(70-93) 134/93     Weight: 65.7 kg (144 lb 13.5 oz)  Body mass index is 27.37 kg/m².      Intake/Output Summary  (Last 24 hours) at 7/8/2019 1309  Last data filed at 7/8/2019 0504  Gross per 24 hour   Intake 500 ml   Output 850 ml   Net -350 ml       Ventilator Data:          Hemodynamic Parameters:       Lines/Drains:       Introducer right internal jugular (Active)   Specific Qualities Capped 6/30/2019  7:10 AM   Dressing Status Clean;Dry;Biopatch in place;Intact 6/30/2019  7:10 AM   Dressing Intervention Dressing reinforced 6/29/2019  3:00 AM   Dressing Change Due 06/29/19 6/30/2019  3:00 AM   Daily Line Review Performed 6/30/2019  7:10 AM   Number of days:             Port A Cath Single Lumen 06/24/14 1200 right subclavian (Active)   Accessed by: Radha Lanza 6/24/2019  5:00 AM   Dressing Type Transparent 6/30/2019  7:10 AM   Dressing Status Biopatch in place;Clean;Dry;Intact 6/30/2019  7:10 AM   Dressing Intervention Dressing reinforced 6/29/2019  3:00 AM   Dressing Change Due 06/30/19 6/30/2019  3:00 AM   Line Status Infusing 6/30/2019  7:10 AM   Flush Performed Yes 6/30/2019  7:10 AM   Date to be Reflushed 06/18/19 6/29/2019  3:00 AM   Daily Line Review Performed 6/30/2019  7:10 AM   Type of Needle Nicole 6/30/2019  3:00 AM   Gauge 20 6/30/2019  3:00 AM   Needle Length 1 in 6/30/2019  3:00 AM   Needle Status Left in place 6/30/2019  3:00 AM   Needle Insertion Date 06/24/19 6/30/2019  3:00 AM   Needle Insertion Time 0500 6/26/2019  3:00 AM   Number of days: 1831            Percutaneous Central Line Insertion/Assessment - double lumen  06/18/19 0510 (Active)   Dressing biopatch in place;dressing dry and intact 6/30/2019  7:10 AM   Securement secured w/ sutures 6/30/2019  7:10 AM   Additional Site Signs no erythema;no warmth 6/30/2019  7:10 AM   Distal Patency/Care infusing 6/30/2019  7:10 AM   Proximal Patency/Care infusing 6/30/2019  7:10 AM   Waveform normal 6/30/2019  7:10 AM   Line Interventions line leveled/zeroed 6/30/2019  7:10 AM   Dressing Change Due 06/29/19 6/30/2019  3:00 AM   Daily Line Review Performed  6/30/2019  7:10 AM   Number of days: 12            Peripheral IV - Single Lumen 06/27/19 0917 22 G Right Forearm (Active)   Site Assessment Clean;Dry;Intact;No redness;No swelling 6/30/2019  7:10 AM   Line Status Infusing 6/30/2019  7:10 AM   Dressing Status Clean;Dry;Intact 6/30/2019  7:10 AM   Dressing Intervention Dressing reinforced 6/29/2019  3:00 AM   Dressing Change Due 07/01/19 6/30/2019  3:00 AM   Site Change Due 07/01/19 6/29/2019  7:00 PM   Reason Not Rotated Not due 6/30/2019  7:10 AM   Number of days: 3            Chest Tube 06/29/19 1655 1 Right Fourth intercostal space;Midaxillary 7 Fr. (Active)   Chest Tube WDL WDL 6/30/2019  7:10 AM   Function -20 cm H2O 6/30/2019  7:10 AM   Air Leak/Fluctuation air leak present 6/30/2019  7:10 AM   Safety all tubing connections taped;2 rubber-tipped hemostats w/ patient;all connections secured;suction checked 6/30/2019  7:10 AM   Securement tubing secured to body distal to insertion site w/ tape 6/30/2019  7:10 AM   Patency Intervention Tip/tilt 6/30/2019  7:10 AM   Drainage Description Serosanguineous 6/30/2019  7:10 AM   Dressing Appearance occlusive gauze dressing intact 6/30/2019  7:10 AM   Dressing Care dressing changed 6/30/2019 10:00 AM   Left Subcutaneous Emphysema none present 6/30/2019  7:10 AM   Right Subcutaneous Emphysema none present 6/30/2019  7:10 AM   Site Assessment Clean;Dry;Intact;No redness;No swelling;Ecchymotic 6/30/2019 10:00 AM   Surrounding Skin Dry;Intact 6/30/2019  7:10 AM   Output (mL) 0 mL 6/30/2019 10:00 AM   Number of days: 0            Y Chest Tube 3 and 4 06/18/19 1151 3 Left Pleural 19 Fr. 4 Left Mediastinal 19 Fr. (Active)   Function -20 cm H2O 6/30/2019  7:10 AM   Air Leak/Fluctuation air leak not present 6/30/2019  7:10 AM   Safety all tubing connections taped;2 rubber-tipped hemostats w/ patient;all connections secured;suction checked 6/30/2019  7:10 AM   Securement tubing secured to body distal to insertion site w/ tape  6/30/2019  7:10 AM   Left Subcutaneous Emphysema none present 6/30/2019  7:10 AM   Right Subcutaneous Emphysema none present 6/30/2019  7:10 AM   Patency Intervention Tip/tilt 6/30/2019  7:10 AM   Drainage Description 3 Serosanguineous 6/30/2019  7:10 AM   Tube 3 Dressing Appearance occlusive gauze dressing intact 6/30/2019  7:10 AM   Tube 3 Dressing Care dressing changed 6/30/2019 10:00 AM   Site Assessment 3 Clean;Dry;Intact 6/30/2019 10:00 AM   Surrounding Skin 3 Dry;Intact 6/30/2019  7:10 AM   Drainage Description 4 Serosanguineous 6/30/2019  7:10 AM   Tube 4 Dressing Appearance occlusive gauze dressing intact 6/30/2019  7:10 AM   Tube 4 Dressing Care dressing changed 6/30/2019 10:00 AM   Site Assessment 4 Clean;Dry;Intact 6/30/2019 10:00 AM   Surrounding Skin 4 Dry;Intact 6/30/2019  7:10 AM   Output (mL) 0 mL 6/30/2019 10:00 AM   Number of days: 11       Significant Labs:  CBC:  Recent Labs   Lab 07/08/19  0530   WBC 5.19   RBC 3.04*   HGB 8.6*   HCT 28.2*   *   MCV 93   MCH 28.3   MCHC 30.5*     BMP:  Recent Labs   Lab 07/08/19  0530      K 4.7      CO2 19*   BUN 14   CREATININE 1.3   CALCIUM 8.0*      Tacrolimus Levels:  Recent Labs   Lab 07/08/19  0530   TACROLIMUS 11.3     Microbiology:  Microbiology Results (last 7 days)     Procedure Component Value Units Date/Time    Culture, Respiratory [[id number]]  (Abnormal)  (Susceptibility) Collected:  07/05/19 1445    Order Status:  Completed Specimen:  Respiratory from Bronchial Wash Updated:  07/08/19 1100     Respiratory Culture No S aureus isolated.      PSEUDOMONAS AERUGINOSA   Moderate  Normal respiratory anibal also present       Gram Stain (Respiratory) Few WBC's     Gram Stain (Respiratory) Few Gram positive rods     Gram Stain (Respiratory) Few budding yeast    Narrative:       Bronchial Wash    AFB Culture & Smear [[id number]] Collected:  07/05/19 1445    Order Status:  Completed Specimen:  Respiratory from Bronchial Wash Updated:   07/06/19 2127     AFB Culture & Smear Culture in progress    Narrative:       Bronchial Wash    Fungus culture [[id number]] Collected:  07/05/19 1445    Order Status:  Sent Specimen:  Respiratory from Bronchial Wash Updated:  07/05/19 1505    Fungus culture [[id number]] Collected:  06/20/19 0808    Order Status:  Completed Specimen:  Respiratory from Bronchial Wash Updated:  07/03/19 1000     Fungus (Mycology) Culture Culture in progress      No fungus isolated after 2 weeks          I have reviewed all pertinent labs within the past 24 hours.    Diagnostic Results:  Chest X-Ray: Vascular catheters, right pleural drain and monitoring leads remain.  There is some high-density material in the upper abdomen likely from the modified barium swallow.  Heart is enlarged there is mild increase in the pulmonary vascular markings.  Persistent parenchymal opacification bilaterally.  No significant pneumothorax seen.  Probable right pleural effusion.

## 2019-07-08 NOTE — PLAN OF CARE
Problem: SLP Goal  Goal: SLP Goal  Speech Language Pathology Goals  Goals expected to be met by 7/4:  1. Patient will participate in ongoing swallow assessment.   2. When determined by SLP, patient may benefit from a repeat modified barium swallow study to further objectively assess swallow function/safety.        Patient making progress toward goals.   Emily P. Abadie M.S., CCC-SLP  Speech Language Pathologist  [phone]  07/08/2019

## 2019-07-08 NOTE — PROGRESS NOTES
Checked patient's BG when dinner tray arrived - . Patient states she is waiting for  to go to grocery to get food for dinner instead of eating tray. Will hold SSI and recheck patient's BG when she eats dinner.

## 2019-07-08 NOTE — PLAN OF CARE
Problem: Adult Inpatient Plan of Care  Goal: Plan of Care Review  Outcome: Ongoing (interventions implemented as appropriate)  AAOx3, afebrile, c/o pain and anxiety. Scheduled and prn PO pain medication given. IV zofran given. Telemetry monitor in place (NSR). BG monitored ACHS and tx per orders. Pt on Contact precautions for  in sputum. IV Cipro continued.  Pt not drinking much. Takes medications for pudding. Pt on room air. Encouraging IS. Clam shell incision with dermabound CDI. Pt able to position self independently in bed. 2 person assist OOB. Pt in lowest position, side rails up x2, non-skid foot wear in place, call light within reach, pt verbalized understanding to call RN when needed. Hand hygiene practiced per protocol. Will continue to monitor.

## 2019-07-08 NOTE — PLAN OF CARE
Problem: Adult Inpatient Plan of Care  Goal: Plan of Care Review  Outcome: Ongoing (interventions implemented as appropriate)  Patient lethargic with slurred speech this am but much more awake/alert this afternoon. PRN oxycodone dose decreased to 5mg Q6H. Patient did work with PT/OT/SLP - encouraged to work on swallowing exercises and using IS. Patient has eaten very little today - ate bites of apple for breakfast and took meds with applesauce. Did not eat lunch as she was asleep. Dysphagia diet with nectar thick liquids continued. Patient OOB to chair twice today.

## 2019-07-08 NOTE — PROGRESS NOTES
[facility]  Lung Transplant  Progress Note - Floor    Patient Name: Juanita Ibarra  MRN: [id number]  Admission Date: 6/17/2019  Hospital Length of Stay: 20 days  Post-Operative Day: 1852 (Lung), 20 (Lung)  Attending Physician: Francisca Morin DO  Primary Care Provider: Primary Doctor Beth     Subjective:     Interval History: No acute events overnight. Remains afebrile and RA with sats >90%. Remains lethargic with delayed responses but still requesting pain medications. Increasing edema of right foot/ankle. Plan for BLE ultrasound. Continues to be able to intermittently clear secretions. Significant other bringing food from outside. Discussed at length on Saturday the importance of abiding by SLP recommendations for a dental soft/nectar thick diet to avoid any risk of aspiration. Discussed with patient that she should only be eating meals when she is fully awake and sitting upright. Patient and s/o verbalize understanding.      Continuous Infusions:    Scheduled Meds:   acetylcysteine 200 mg/ml (20%)  4 mL Nebulization Q12H    ciprofloxacin  400 mg Intravenous Q8H    dapsone  100 mg Oral Daily    enoxaparin  40 mg Subcutaneous Daily    famotidine  20 mg Oral BID    fluconazole  400 mg Oral Daily    gabapentin  300 mg Oral TID    insulin aspart U-100  4-6 Units Subcutaneous TIDWM    levalbuterol  1.25 mg Nebulization Q6H WAKE    lidocaine  1 patch Transdermal Q24H    lidocaine  1 patch Transdermal Q24H    lipase-protease-amylase 24,000-76,000-120,000 units  4 capsule Oral TID WM    metoprolol tartrate  25 mg Oral TID    micafungin (MYCAMINE) IVPB  100 mg Intravenous Q24H    morphine  15 mg Oral Q8H    mycophenolate  500 mg Oral BID    predniSONE  20 mg Oral Daily    QUEtiapine  50 mg Oral QHS    sodium chloride 7%  4 mL Nebulization Q12H    tacrolimus  0.5 mg Oral Daily    [START ON 7/9/2019] tacrolimus  1 mg Oral Daily    valGANciclovir  450 mg Oral BID     venlafaxine  150 mg Oral QHS    venlafaxine  37.5 mg Oral QHS     PRN Meds:sodium chloride, acetaminophen, bisacodyl, Dextrose 10% Bolus, Dextrose 10% Bolus, diphenhydrAMINE, glucagon (human recombinant), glucose, glucose, insulin aspart U-100, insulin aspart U-100, lactulose, lipase-protease-amylase 24,000-76,000-120,000 units, LORazepam, magnesium sulfate IVPB, metoclopramide HCl, metoprolol, naloxone, ondansetron, ondansetron, oxyCODONE, potassium chloride in water **AND** potassium chloride in water **AND** potassium chloride in water, promethazine (PHENERGAN) IVPB    Review of patient's allergies indicates:   Allergen Reactions    Albuterol Palpitations    Colistin Anaphylaxis    Vancomycin analogues      Infusion reaction that does not resolve with slowing  Pt. States she can tolerate it when given with 50 mg Benadryl and ran over 3 hours    Neupogen [filgrastim] Other (See Comments)     Ostealgia after five daily doses of 300 mcg.      Bactrim [sulfamethoxazole-trimethoprim] Hives    Ceftazidime Hives     Pt stated can tolerate cefapine not ceftazidime    Ceftazidime     Dronabinol Other (See Comments)     Mental changes/hallucinations    Haldol [haloperidol lactate] Other (See Comments)     Seizure like activity    Nsaids (non-steroidal anti-inflammatory drug)      Cannot have due to lung transplant    Adhesive Rash     Cloth tape- please use tegaderm or paper tape    Aztreonam Rash    Ciprofloxacin Nausea And Vomiting     Projectile N/V, per patient.  Unwilling to retry therapy.       Review of Systems   Constitutional: Negative for appetite change, chills, diaphoresis and fever.   HENT: Negative for congestion, postnasal drip, rhinorrhea, sinus pressure and sinus pain.    Respiratory: Positive for cough and shortness of breath. Negative for wheezing.    Cardiovascular: Positive for chest pain (incision site). Negative for palpitations and leg swelling.   Gastrointestinal: Positive for  diarrhea. Negative for abdominal distention, nausea and vomiting.   Genitourinary: Negative for decreased urine volume, difficulty urinating, dysuria and urgency.   Musculoskeletal: Positive for back pain (chronic). Negative for neck pain and neck stiffness.   Skin: Positive for pallor. Negative for rash and wound.   Allergic/Immunologic: Positive for immunocompromised state.   Neurological: Positive for weakness. Negative for dizziness, syncope and headaches.   Psychiatric/Behavioral: Negative for confusion. The patient is nervous/anxious.      Objective:   Physical Exam   Constitutional: She is oriented to person, place, and time. She appears well-developed and well-nourished. She is easily aroused. No distress.   HENT:   Head: Normocephalic and atraumatic.   Right Ear: External ear normal.   Left Ear: External ear normal.   Nose: Nose normal.   Eyes: Conjunctivae and EOM are normal. No scleral icterus.   Neck: Normal range of motion. Neck supple. No JVD present.   Cardiovascular: Normal rate, regular rhythm and normal heart sounds. Exam reveals no friction rub.   No murmur heard.  Pulmonary/Chest: Effort normal. No accessory muscle usage. No respiratory distress. She has decreased breath sounds in the right middle field, the right lower field and the left lower field. She has no wheezes. She has no rhonchi. She has no rales.   Abdominal: Soft. Bowel sounds are normal. She exhibits no distension. There is no tenderness.   Musculoskeletal: Normal range of motion. She exhibits edema (RLE to ankle).   Neurological: She is alert, oriented to person, place, and time and easily aroused. No cranial nerve deficit.   Skin: Skin is warm and dry. No rash noted. She is not diaphoretic. No erythema.   Psychiatric: Her mood appears anxious.   Nursing note and vitals reviewed.        Vital Signs (Most Recent):  Temp: 99.1 °F (37.3 °C) (07/08/19 1200)  Pulse: 86 (07/08/19 1200)  Resp: 12 (07/08/19 1200)  BP: (!) 134/93 (07/08/19  1200)  SpO2: (!) 92 % (07/08/19 1200) Vital Signs (24h Range):  Temp:  [98 °F (36.7 °C)-99.7 °F (37.6 °C)] 99.1 °F (37.3 °C)  Pulse:  [] 86  Resp:  [12-22] 12  SpO2:  [91 %-95 %] 92 %  BP: (110-135)/(70-93) 134/93     Weight: 65.7 kg (144 lb 13.5 oz)  Body mass index is 27.37 kg/m².      Intake/Output Summary (Last 24 hours) at 7/8/2019 1309  Last data filed at 7/8/2019 0504  Gross per 24 hour   Intake 500 ml   Output 850 ml   Net -350 ml       Ventilator Data:          Hemodynamic Parameters:       Lines/Drains:       Introducer right internal jugular (Active)   Specific Qualities Capped 6/30/2019  7:10 AM   Dressing Status Clean;Dry;Biopatch in place;Intact 6/30/2019  7:10 AM   Dressing Intervention Dressing reinforced 6/29/2019  3:00 AM   Dressing Change Due 06/29/19 6/30/2019  3:00 AM   Daily Line Review Performed 6/30/2019  7:10 AM   Number of days:             Port A Cath Single Lumen 06/24/14 1200 right subclavian (Active)   Accessed by: Radha Lanza 6/24/2019  5:00 AM   Dressing Type Transparent 6/30/2019  7:10 AM   Dressing Status Biopatch in place;Clean;Dry;Intact 6/30/2019  7:10 AM   Dressing Intervention Dressing reinforced 6/29/2019  3:00 AM   Dressing Change Due 06/30/19 6/30/2019  3:00 AM   Line Status Infusing 6/30/2019  7:10 AM   Flush Performed Yes 6/30/2019  7:10 AM   Date to be Reflushed 06/18/19 6/29/2019  3:00 AM   Daily Line Review Performed 6/30/2019  7:10 AM   Type of Needle Nicole 6/30/2019  3:00 AM   Gauge 20 6/30/2019  3:00 AM   Needle Length 1 in 6/30/2019  3:00 AM   Needle Status Left in place 6/30/2019  3:00 AM   Needle Insertion Date 06/24/19 6/30/2019  3:00 AM   Needle Insertion Time 0500 6/26/2019  3:00 AM   Number of days: 1831            Percutaneous Central Line Insertion/Assessment - double lumen  06/18/19 0510 (Active)   Dressing biopatch in place;dressing dry and intact 6/30/2019  7:10 AM   Securement secured w/ sutures 6/30/2019  7:10 AM   Additional Site Signs no  erythema;no warmth 6/30/2019  7:10 AM   Distal Patency/Care infusing 6/30/2019  7:10 AM   Proximal Patency/Care infusing 6/30/2019  7:10 AM   Waveform normal 6/30/2019  7:10 AM   Line Interventions line leveled/zeroed 6/30/2019  7:10 AM   Dressing Change Due 06/29/19 6/30/2019  3:00 AM   Daily Line Review Performed 6/30/2019  7:10 AM   Number of days: 12            Peripheral IV - Single Lumen 06/27/19 0917 22 G Right Forearm (Active)   Site Assessment Clean;Dry;Intact;No redness;No swelling 6/30/2019  7:10 AM   Line Status Infusing 6/30/2019  7:10 AM   Dressing Status Clean;Dry;Intact 6/30/2019  7:10 AM   Dressing Intervention Dressing reinforced 6/29/2019  3:00 AM   Dressing Change Due 07/01/19 6/30/2019  3:00 AM   Site Change Due 07/01/19 6/29/2019  7:00 PM   Reason Not Rotated Not due 6/30/2019  7:10 AM   Number of days: 3            Chest Tube 06/29/19 1655 1 Right Fourth intercostal space;Midaxillary 7 Fr. (Active)   Chest Tube WDL WDL 6/30/2019  7:10 AM   Function -20 cm H2O 6/30/2019  7:10 AM   Air Leak/Fluctuation air leak present 6/30/2019  7:10 AM   Safety all tubing connections taped;2 rubber-tipped hemostats w/ patient;all connections secured;suction checked 6/30/2019  7:10 AM   Securement tubing secured to body distal to insertion site w/ tape 6/30/2019  7:10 AM   Patency Intervention Tip/tilt 6/30/2019  7:10 AM   Drainage Description Serosanguineous 6/30/2019  7:10 AM   Dressing Appearance occlusive gauze dressing intact 6/30/2019  7:10 AM   Dressing Care dressing changed 6/30/2019 10:00 AM   Left Subcutaneous Emphysema none present 6/30/2019  7:10 AM   Right Subcutaneous Emphysema none present 6/30/2019  7:10 AM   Site Assessment Clean;Dry;Intact;No redness;No swelling;Ecchymotic 6/30/2019 10:00 AM   Surrounding Skin Dry;Intact 6/30/2019  7:10 AM   Output (mL) 0 mL 6/30/2019 10:00 AM   Number of days: 0            Y Chest Tube 3 and 4 06/18/19 1151 3 Left Pleural 19 Fr. 4 Left Mediastinal 19 Fr.  (Active)   Function -20 cm H2O 6/30/2019  7:10 AM   Air Leak/Fluctuation air leak not present 6/30/2019  7:10 AM   Safety all tubing connections taped;2 rubber-tipped hemostats w/ patient;all connections secured;suction checked 6/30/2019  7:10 AM   Securement tubing secured to body distal to insertion site w/ tape 6/30/2019  7:10 AM   Left Subcutaneous Emphysema none present 6/30/2019  7:10 AM   Right Subcutaneous Emphysema none present 6/30/2019  7:10 AM   Patency Intervention Tip/tilt 6/30/2019  7:10 AM   Drainage Description 3 Serosanguineous 6/30/2019  7:10 AM   Tube 3 Dressing Appearance occlusive gauze dressing intact 6/30/2019  7:10 AM   Tube 3 Dressing Care dressing changed 6/30/2019 10:00 AM   Site Assessment 3 Clean;Dry;Intact 6/30/2019 10:00 AM   Surrounding Skin 3 Dry;Intact 6/30/2019  7:10 AM   Drainage Description 4 Serosanguineous 6/30/2019  7:10 AM   Tube 4 Dressing Appearance occlusive gauze dressing intact 6/30/2019  7:10 AM   Tube 4 Dressing Care dressing changed 6/30/2019 10:00 AM   Site Assessment 4 Clean;Dry;Intact 6/30/2019 10:00 AM   Surrounding Skin 4 Dry;Intact 6/30/2019  7:10 AM   Output (mL) 0 mL 6/30/2019 10:00 AM   Number of days: 11       Significant Labs:  CBC:  Recent Labs   Lab 07/08/19  0530   WBC 5.19   RBC 3.04*   HGB 8.6*   HCT 28.2*   *   MCV 93   MCH 28.3   MCHC 30.5*     BMP:  Recent Labs   Lab 07/08/19  0530      K 4.7      CO2 19*   BUN 14   CREATININE 1.3   CALCIUM 8.0*      Tacrolimus Levels:  Recent Labs   Lab 07/08/19  0530   TACROLIMUS 11.3     Microbiology:  Microbiology Results (last 7 days)     Procedure Component Value Units Date/Time    Culture, Respiratory [[id number]]  (Abnormal)  (Susceptibility) Collected:  07/05/19 1445    Order Status:  Completed Specimen:  Respiratory from Bronchial Wash Updated:  07/08/19 1100     Respiratory Culture No S aureus isolated.      PSEUDOMONAS AERUGINOSA   Moderate  Normal respiratory anibal also present        Gram Stain (Respiratory) Few WBC's     Gram Stain (Respiratory) Few Gram positive rods     Gram Stain (Respiratory) Few budding yeast    Narrative:       Bronchial Wash    AFB Culture & Smear [[id number]] Collected:  07/05/19 1445    Order Status:  Completed Specimen:  Respiratory from Bronchial Wash Updated:  07/06/19 2127     AFB Culture & Smear Culture in progress    Narrative:       Bronchial Wash    Fungus culture [[id number]] Collected:  07/05/19 1445    Order Status:  Sent Specimen:  Respiratory from Bronchial Wash Updated:  07/05/19 1505    Fungus culture [[id number]] Collected:  06/20/19 0808    Order Status:  Completed Specimen:  Respiratory from Bronchial Wash Updated:  07/03/19 1000     Fungus (Mycology) Culture Culture in progress      No fungus isolated after 2 weeks          I have reviewed all pertinent labs within the past 24 hours.    Diagnostic Results:  Chest X-Ray: Vascular catheters, right pleural drain and monitoring leads remain.  There is some high-density material in the upper abdomen likely from the modified barium swallow.  Heart is enlarged there is mild increase in the pulmonary vascular markings.  Persistent parenchymal opacification bilaterally.  No significant pneumothorax seen.  Probable right pleural effusion.           Assessment/Plan:     * S/P lung transplant  POD#20 s/p bilateral lung transplant (re-transplant) for CARLOS EDUARDO. 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 PGD 0. S/p bronch and extubated to HFNC on POD#2, but was re-intubated 6/21 for hypercapnic respiratory failure. Right pigtail placed for PTX on 6/29. CTS ADIS Carlotta aware of right chest tube output.    Continue PT/OT. Continue scheduled nebs and IS/CPT. Continue immunosuppression and prophylaxis. OR bronchoscopy 7/5 - cultures with GNR. CXR PA and Lateral 7/6 with increasing opacification in the right middle to lower lung zone.    Immunosuppression  Previously on tacrolimus, MMF, and daily prednisone. S/p solumedrol in OR,  basiliximab on POD#0 and POD#4. Continue MMF, tacrolimus, and steroid taper. Monitor daily tacrolimus levels and adjust dose as needed.       Prophylactic antibiotic  CMV D-/R+. Continue OIP with valganciclovir, fluconazole, and dapsone. Bronch wash 6/20 and 6/23 with  Pseudomonas and Candida Glabrata. Vancomycin discontinued. Continue ciprofloxacin 400 mg Q8hrs. Continue micafungin.    Acute blood loss anemia  Received 6u PRBCs, 2u plts, 1 cryo, and 4 FFP angie-operatively. 1u PRBC on 7/5. H/H stable today.     Adrenal cortical steroids causing adverse effect in therapeutic use  Endocrinology consulted, appreciate recs.    CF related Pancreatic insufficiency  Continue Creon with meals and snacks.     Chronic pain with opiate use  Anesthesia consulted for pain mgmt. Continue with scheduled morphine Q8hrs. Dilaudid d/c. Oxy 5 mg IR Q6hrs PRN available.     Steroid-induced hyperglycemia  Endocrine following, appreciate recs.     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 ReceivedTobramycin x1 on 6/22. Continue ciprofloxacin. Follow up on bronchial wash cultures from 7/5.    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 Repeat BAL on 6/23 with Candida glabrata. Discussed with ID, Dr. Francis. Continue micafungin.    Anxiety disorder  Continue home dose of Effexor and Ativan.        Jacqueline Dumont PA-C  Lung Transplant  [facility]

## 2019-07-08 NOTE — PT/OT/SLP PROGRESS
Physical Therapy Treatment    Patient Name:  Juanita Ibarra   MRN:  [id number]    Recommendations:     Discharge Recommendations:  rehabilitation facility   Discharge Equipment Recommendations: commode, walker, rolling   Barriers to discharge: None    Assessment:     Juanita Ibarra is a 29 y.o. female admitted with a medical diagnosis of S/P lung transplant.  She presents with the following impairments/functional limitations:  weakness, impaired endurance, impaired functional mobilty, pain, decreased lower extremity function, decreased upper extremity function, gait instability, impaired balance, impaired cardiopulmonary response to activity Patient participated well, requires extra time, fatiuges easily. Able to amb w/ RW and min of 2 to stretcher, approx 3 feet and turning to sit. .    Rehab Prognosis: Good; patient would benefit from acute skilled PT services to address these deficits and reach maximum level of function.    Recent Surgery: Procedure(s) (LRB):  Bronchoscopy (N/A) 3 Days Post-Op    Plan:     During this hospitalization, patient to be seen 5 x/week to address the identified rehab impairments via gait training, therapeutic activities, therapeutic exercises, neuromuscular re-education and progress toward the following goals:    · Plan of Care Expires:  07/17/19    Subjective     Chief Complaint: pain, fatigue  Patient/Family Comments/goals: I just have to focus now  Pain/Comfort:  · Pain Rating 1: 8/10  · Location - Side 1: Bilateral  · Location - Orientation 1: generalized  · Location 1: abdomen  · Pain Addressed 1: Reposition, Distraction, Nurse notified(nurse provides meds)  · Pain Rating Post-Intervention 1: 8/10      Objective:     Communicated with nurse prior to session.  Patient found up in chair with central line, telemetry upon PT entry to room.     General Precautions: Standard, fall, neutropenic, contact, other (see comments)(thin liquids)   Orthopedic Precautions:N/A    Braces: N/A     Functional Mobility:  · Bed Mobility:     · Sit to Supine: moderate assistance  · Transfers:     · Sit to Stand:  moderate assistance and of 2 persons with no AD x1 trial; then min assist of 2 w/ RW x1 trial.   · Chair to stretcher to go to US  minimum assistance of 2 with  rolling walker  using  Step Transfer  · Gait: w/ RW and min assist of 2 three feet, then turning to sit on stretcher. (rolled in chair to door and then walks to stretcher. PCT and PT assisting. Slow pace. Sufficient foot clearance.      AM-PAC 6 CLICK MOBILITY  Turning over in bed (including adjusting bedclothes, sheets and blankets)?: 3  Sitting down on and standing up from a chair with arms (e.g., wheelchair, bedside commode, etc.): 2  Moving from lying on back to sitting on the side of the bed?: 2  Moving to and from a bed to a chair (including a wheelchair)?: 2  Need to walk in hospital room?: 2  Climbing 3-5 steps with a railing?: 1  Basic Mobility Total Score: 12       Therapeutic Activities and Exercises:   Sitting in bedside chair, patient performs BLE exercises x20 reps w/ assist as needed, use of HO, AP, GS, QS, hip abd, LAQ, marching seated. Rest breaks.  Stands from chair w/ mod of 2, using a pillow for abdominal bracing and stands approx 30 seconds. Returned to chair to prepare to go to US (to ck for DVT d/t RLE swelling)  patient educated in gait and trf technique, PT pOC, LE exercises w/ use of written copy.    Patient left HOB elevated on stretcher w/ oxygen, mask with all lines intact and transport..    GOALS:   Multidisciplinary Problems     Physical Therapy Goals        Problem: Physical Therapy Goal    Goal Priority Disciplines Outcome Goal Variances Interventions   Physical Therapy Goal     PT, PT/OT Ongoing (interventions implemented as appropriate)     Description:  Goals to be met by: 19    Patient will increase functional independence with mobility by performin. Supine to sit with Stand-by  Assistance.  2. Sit to stand transfer with Tonio.  3. Pt to perform stand pivot transfer with Tonio, with use of RW.  4. Gait  x 20 feet with Tonio, with use of RW.  5.  Pt to perform and be compliant with exercise program in sit, to reduce complication s/p surgery.                     Time Tracking:     PT Received On: 07/08/19  PT Start Time: 1505     PT Stop Time: 1543  PT Total Time (min): 38 min     Billable Minutes: Gait Training 10, Therapeutic Activity 15 and Therapeutic Exercise 13    Treatment Type: Treatment  PT/PTA: PT     PTA Visit Number: 0     Elana De Anda, PT  07/08/2019

## 2019-07-08 NOTE — ASSESSMENT & PLAN NOTE
Anesthesia consulted for pain mgmt. Continue with scheduled morphine Q8hrs. Dilaudid d/c. Oxy 5 mg IR Q6hrs PRN available.

## 2019-07-08 NOTE — PT/OT/SLP PROGRESS
Occupational Therapy   Treatment    Name: Juanita Ibarra  MRN: [id number]  Admitting Diagnosis:  S/P lung transplant  3 Days Post-Op    Recommendations:     Discharge Recommendations: rehabilitation facility  Discharge Equipment Recommendations:  commode, walker, rolling  Barriers to discharge:  None    Assessment:     Juanita Ibarra is a 29 y.o. female with a medical diagnosis of S/P lung transplant. Pt progressing fairly well in therapy; however continues to fatigue quickly with minimal activity. Pt requires increased time due to her generalized weakness and impaired endurance. Pt's decreased activity tolerance affects her pace and quality of movements during functional activities. Pt tolerated session fairly well demonstrating the ability to complete transfers with min- mod A and functional mobility (3 steps toward restroom) with min A and RW. Pt with improved ADL performance requiring SPV for BLE dressing and min A for toileting. Pt's spouse present and active in pt's POC. OT change discharge recommendation to rehab facility due to her current functional status, social support, and motivation. Pt will continue to benefit from skilled OT to prevent further debility and ensure a safe return to the next place of care.  Performance deficits affecting function are weakness, impaired endurance, impaired self care skills, impaired functional mobilty, impaired balance, impaired cardiopulmonary response to activity.     Rehab Prognosis:  Good; patient would benefit from acute skilled OT services to address these deficits and reach maximum level of function.       Plan:     Patient to be seen 3 x/week to address the above listed problems via self-care/home management, therapeutic activities, therapeutic exercises, neuromuscular re-education  · Plan of Care Expires: 08/06/19  · Plan of Care Reviewed with: patient, spouse    Subjective     Pain/Comfort:  · Pain Rating 1: (pt did not rate)  · Pain Rating  Post-Intervention 1: (pt did not rate)    Objective:     Communicated with: RN prior to session.  Patient found up in chair with central line, telemetry upon OT entry to room.    General Precautions: Standard, fall   Orthopedic Precautions:N/A   Braces: N/A     Occupational Performance:     Bed Mobility:    · Patient completed Rolling/Turning to Right with contact guard assistance  · Patient completed Scooting/Bridging with maximal assistance due to weakness for posterior positioning in bed   · Patient completed Sit to Supine with moderate assistance with trunk guidance and BLE advancement into the bed     Functional Mobility/Transfers:  · Patient completed Sit <> Stand Transfer from chair x2 with minimum assistance  with  no assistive device; x1 with RW with min A  · Patient completed Chair <> ToiletTransfer using Stand pivot Transfer technique with minimum assistance with no assistive device and hand-held assist and posterior facilitation   · Patient completed Toilet Transfer Step Transfer technique with minimum assistance to descend with  grab bars and mod A to ascend with grab bars and posterior facilitation due to overall weakness  · Functional Mobility: Pt completed ~ 3 steps towards the restroom before complaining of fatigue with min A and RW; Pt politely declined further functional mobility     Activities of Daily Living:  · Lower Body Dressing: supervision seated in chair to don BLE socks  · Toileting: minimum assistance for balance and thorougness of wiping in standing      AMPA 6 Click ADL: 21    Treatment & Education:  - Role of OT/ OT POC  - Self care safety/ independence  - Functional transfer/ mobility safety  - Bed mobility safety  - Importance of sitting UIC  - Energy conservation techniques such as pacing and taking rest breaks as needed  - Functional performance this date   - Importance of participation in therapy for improved strength and endurance      Patient left HOB elevated with all lines  intact, call button in reach, RN notified and Spouse presentEducation:      GOALS:   Multidisciplinary Problems     Occupational Therapy Goals        Problem: Occupational Therapy Goal    Goal Priority Disciplines Outcome Interventions   Occupational Therapy Goal     OT, PT/OT Ongoing (interventions implemented as appropriate)    Description:  Goals to be met by: 7/4/19     Patient will increase functional independence with ADLs by performing:    UE Dressing with Supervision.  LE Dressing with Supervision. - Met on 7/8 with socks   Grooming while standing at sink with Supervision.  Toileting from toilet with Supervision for hygiene and clothing management. - Progressing   Toilet transfer to toilet with Supervision.- Progressing                        Time Tracking:     OT Date of Treatment: 07/08/19  OT Start Time: 1103  OT Stop Time: 1147  OT Total Time (min): 44 min    Billable Minutes:Self Care/Home Management 30  Therapeutic Activity 14    Ann Hood OT  7/8/2019

## 2019-07-08 NOTE — PLAN OF CARE
Problem: Adult Inpatient Plan of Care  Goal: Plan of Care Review  Recommendations     Recommendation/Intervention: 1.) Continue current diet ordered by SLP. 2.) Daily weights.   Goals: 1.) Pt to consume/tolerate >75% EEN and EPN by follow up.  Nutrition Goal Status: progressing towards goal  Communication of RD Recs: (POC)    Assessment and Plan  Nutrition Problem  Increased nutrient needs  Swallowing difficulty     Related to (etiology):   Physiological causes related to healing  Mechanical causes from surgery     Signs and Symptoms (as evidenced by):   S/p redo BLTx 6/18   Abnormal findings from barium swallow evaluation     Interventions/Recommendations (treatment strategy):  Collaboration and referral of nutrition care     Nutrition Diagnosis Status:   Continues   Progressing     Nutrition Problem  Inadequate energy intake     Related to (etiology):   Decreased ability to consume sufficient energy     Signs and Symptoms (as evidenced by):   NPO with no alternative means of nutrition at this time     Interventions/Recommendations (treatment strategy):  Collaboration of nutrition care with other providers     Nutrition Diagnosis Status:   Resolved

## 2019-07-08 NOTE — PT/OT/SLP PROGRESS
"Speech Language Pathology Treatment    Patient Name:  Juanita Ibarra   MRN:  [id number]  Admitting Diagnosis: S/P lung transplant    Recommendations:                 General Recommendations:  Dysphagia therapy  Diet recommendations:  Dental Soft, Liquid Diet Level: Nectar Thick   Aspiration Precautions: 1 bite/sip at a time, Assistance with thickening liquids, HOB to 90 degrees, Remain upright 30 minutes post meal and Small bites/sips   General Precautions: Standard, fall  Communication strategies:  none    Subjective     Patient awake;alert. Seated upright at the bedside chair. Spouse present.     Pain/Comfort:  · Pain Rating 1: 0/10    Objective:     Has the patient been evaluated by SLP for swallowing?   Yes  Keep patient NPO? No   Current Respiratory Status: room air      RN present administering medications buried in puree which patient tolerated with no overt signs of airway compromise. Patient also tolerated nectar thickened liquids along with regular solid (apple slice) with no overt signs of airway compromise. Handout of dysphagia exercises which was left during previous session  reviewed with patient. Spouse reported that patient has not been "awake enough" over previous weekend days to complete exercises. ST reviewed need for completion of swallow exercises in order for progress in swallow safety. Patient very distracted by internal and external stimuli, patient only completed falsetto /I/ x1. Patient often reporting "I can't do that" with  without attempts at completion. Patient provided much motivation and encouragement to complete swallow exercises outside of sessions for hopeful resumption of thin liquids and for increased swallow safety/strength. Patient verbalized understanding.     Assessment:     Juanita Ibarra is a 29 y.o. female with an SLP diagnosis of Dysphagia.      Goals:   Multidisciplinary Problems     SLP Goals        Problem: SLP Goal    Goal Priority Disciplines Outcome "   SLP Goal     SLP    Description:  Speech Language Pathology Goals  Goals expected to be met by 7/4:  1. Patient will participate in ongoing swallow assessment.   2. When determined by SLP, patient may benefit from a repeat modified barium swallow study to further objectively assess swallow function/safety.                         Plan:     · Patient to be seen:  4 x/week   · Plan of Care expires:  07/27/19  · Plan of Care reviewed with:  patient, spouse   · SLP Follow-Up:  Yes       Discharge recommendations:  rehabilitation facility   Barriers to Discharge:  None    Time Tracking:     SLP Treatment Date:   07/08/19  Speech Start Time:  0955  Speech Stop Time:  1015     Speech Total Time (min):  20 min    Billable Minutes: Treatment Swallowing Dysfunction 10 and Seld Care/Home Management Training 10    Emily Abadie, CCC-SLP  07/08/2019

## 2019-07-09 NOTE — PT/OT/SLP PROGRESS
"Physical Therapy Treatment    Patient Name:  Juanita Ibarra   MRN:  [id number]    Recommendations:     Discharge Recommendations:  rehabilitation facility   Discharge Equipment Recommendations: (rolling walker, bedside commode)   Barriers to discharge: increased level of assistance required; fall risk    Assessment:     Juanita Ibarra is a 29 y.o. female s/p lung transplant. Pt's activity tolerance remains limited by pain and fatigue. Ms. Ibarra demonstrated good effort during session, but continues to require increased encouragement to progress activities. She requires moderate assistance to perform sit<>stand transfers and is unable to ambulate household distances due to deconditioning.  She presents with the following impairments/functional limitations:  weakness, impaired endurance, impaired self care skills, impaired functional mobilty, gait instability, impaired balance, decreased lower extremity function, decreased upper extremity function, pain, impaired cardiopulmonary response to activity. Pt would benefit from continued PT intervention to address listed functional deficits, reduce fall risk and maximize return to PLOF.     Rehab Prognosis: Good; patient would benefit from acute skilled PT services to address these deficits and reach maximum level of function.      Recent Surgery: Procedure(s) (LRB):  Bronchoscopy (N/A) 4 Days Post-Op    Plan:     During this hospitalization, patient to be seen 5 x/week to address the identified rehab impairments via gait training, therapeutic activities, therapeutic exercises, neuromuscular re-education and progress toward the following goals:    · Plan of Care Expires:  07/17/19    Subjective     Chief Complaint: pain; per pt: "It feels like knives are stabbing under by ribs"   Patient/Family Comments/goals: "I'll try my best"   Pain/Comfort:  · Pain Rating 1: 8/10  · Location - Side 1: Bilateral  · Location 1: abdomen  · Pain Addressed 1: Reposition, " Distraction, Nurse notified      Objective:     Communicated with RN prior to session.  Patient found sitting up in chair with telemetry, central line upon PT entry to room. Pt's significant other at bedside.     General Precautions: Standard, fall, neutropenic, contact, aspiration   Orthopedic Precautions:N/A   Braces: N/A     Functional Mobility:    Bed Mobility:   · N/T 2/2 pt sitting up in chair upon PT arrival     Transfers:   · Sit <> Stand Transfer: moderate assistance from bedside chair x 4 trials with RW     Standing Balance: impaired; high fall risk   · Pt with poor immediate standing balance, requiring RW for UE support and mod A to maintain balance and perform weight shifting 2/2 posterior lean   · Pt requiring mod A and chair follow during gait due to BLE instability and frequent quick descent into sitting without warning.                  Gait:  · Distance: 3 steps + 3 steps + 4 step trials   · Assistance level: moderate assistance (to maintain balance and provide chair follow 2/2 instability)   · Assistive Device: RW  · Gait Assessment: step-to gait pattern with decreased step length, posterior lean, forward flexed posture, excessive knee flexion   *On each trial, pt required mod-max A to control descent into sitting in chair 2/2 sudden descent, which pt attributed to fatigue     Therapeutic Activities and Exercises:  Therapist reviewed education re:   - PT POC   - importance of participation in therapy, goals for therapy, and benefits of increasing OOB activity with assistance   - safety with mobility and fall risk   - seated therex program to be performed DAILY: pt demonstrated understanding of seated marches, LAQ and ankle pumps x 10 reps bilaterally.     Therapeutic activities aimed to:  - increase pt's independence, safety, and efficiency with functional transfers. See above for assistance levels. Pt required cueing for set-up of transfer, hand placement, body positioning, and forward lean.   -  improve tolerance for activity/functional endurance     Therapist facilitated trials of gait training to improve gait stability, endurance, and independence with functional ambulation. Pt with poor balance and decreased endurance, limiting duration/distance of trials.     Patient left up in chair with all lines intact, call button in reach, RN notified and significant other present.    AM-PAC 6 CLICK MOBILITY  Turning over in bed (including adjusting bedclothes, sheets and blankets)?: 3  Sitting down on and standing up from a chair with arms (e.g., wheelchair, bedside commode, etc.): 2  Moving from lying on back to sitting on the side of the bed?: 2  Moving to and from a bed to a chair (including a wheelchair)?: 2  Need to walk in hospital room?: 2  Climbing 3-5 steps with a railing?: 1  Basic Mobility Total Score: 12     GOALS:   Multidisciplinary Problems     Physical Therapy Goals        Problem: Physical Therapy Goal    Goal Priority Disciplines Outcome Goal Variances Interventions   Physical Therapy Goal     PT, PT/OT Ongoing (interventions implemented as appropriate)     Description:  Goals to be met by: 19    Patient will increase functional independence with mobility by performin. Supine <> sit with stand-by assistance.  2. Sit to stand transfer from bedside chair with minimum assistance.   3. Pt to perform stand pivot transfer with Tonio, with use of RW.  4. Gait  x 50 feet with Tonio, with use of RW.  5.  Pt to perform and be compliant with exercise program in sit, to reduce complication s/p surgery.                      Time Tracking:     PT Received On: 19  PT Start Time: 1036     PT Stop Time: 1109  PT Total Time (min): 33 min     Billable Minutes: Gait Training 12 min and Therapeutic Activity 21 min    Treatment Type: Treatment  PT/PTA: PT     PTA Visit Number: 0     Kari Suarez PT, DPT   2019  Pager: [phone]

## 2019-07-09 NOTE — CONSULTS
Inpatient consult to Physical Medicine Rehab  Consult performed by: Norma Bhatia NP  Consult ordered by: Jake Alvarez MD  Reason for consult: assess rehab needs        Reviewed patient history and current admission.  Rehab team following.  Full consult to follow.    HAFSA Christian, FNP-C  Physical Medicine & Rehabilitation   07/09/2019  Spectralink: [id number]

## 2019-07-09 NOTE — CONSULTS
Consulted for Rehab transfer    · [facility] referral placed into [facility]  · s/p JOCELYNE--Retransplantation, debility, sternal precautions PT/OT recommend [facility] PMR consulted, [facility] when Insurance auth approved and bed available   · Will follow up  · Thanks Rocio

## 2019-07-09 NOTE — ASSESSMENT & PLAN NOTE
CMV D-/R+. Received Vanc/Merrem as routine surgical prophylaxis, which was transitioned to Cipro. Continue OIP with valganciclovir, fluconazole, and dapsone. Continue micafungin for Candida glabrata.

## 2019-07-09 NOTE — PROGRESS NOTES
[facility]  Lung Transplant  Progress Note - Floor    Patient Name: Juanita Ibarra  MRN: [id number]  Admission Date: 6/17/2019  Hospital Length of Stay: 21 days  Post-Operative Day: 1853 (Lung), 21 (Lung)  Attending Physician: Francisca Morin DO  Primary Care Provider: Primary Doctor No     Subjective:     Interval History: No acute events overnight. Reports ongoing difficulty clearing secretions. Worked well with PT today. Not using IS as instructed. Sleeping more frequently during the day.       Continuous Infusions:    Scheduled Meds:   acetylcysteine 200 mg/ml (20%)  4 mL Nebulization Q12H    ceftolozane-tazobactam  1,500 mg Intravenous Q8H    dapsone  100 mg Oral Daily    enoxaparin  40 mg Subcutaneous Daily    famotidine  20 mg Oral BID    fluconazole  400 mg Oral Daily    gabapentin  300 mg Oral TID    insulin aspart U-100  4-6 Units Subcutaneous TIDWM    levalbuterol  1.25 mg Nebulization Q6H WAKE    lipase-protease-amylase 24,000-76,000-120,000 units  4 capsule Oral TID WM    metoprolol tartrate  25 mg Oral TID    micafungin (MYCAMINE) IVPB  100 mg Intravenous Q24H    morphine  15 mg Oral Q8H    mycophenolate  500 mg Oral BID    predniSONE  20 mg Oral Daily    QUEtiapine  50 mg Oral QHS    sodium chloride 7%  4 mL Nebulization Q12H    tacrolimus  0.5 mg Oral Daily    tacrolimus  1 mg Oral Daily    valGANciclovir  450 mg Oral BID    venlafaxine  150 mg Oral QHS    venlafaxine  37.5 mg Oral QHS     PRN Meds:sodium chloride, acetaminophen, bisacodyl, Dextrose 10% Bolus, Dextrose 10% Bolus, diphenhydrAMINE, glucagon (human recombinant), glucose, glucose, insulin aspart U-100, insulin aspart U-100, lactulose, lipase-protease-amylase 24,000-76,000-120,000 units, LORazepam, magnesium sulfate IVPB, metoclopramide HCl, metoprolol, naloxone, ondansetron, ondansetron, oxyCODONE, potassium chloride in water **AND** potassium chloride in water **AND** potassium  chloride in water, promethazine (PHENERGAN) IVPB    Review of patient's allergies indicates:   Allergen Reactions    Albuterol Palpitations    Colistin Anaphylaxis    Vancomycin analogues      Infusion reaction that does not resolve with slowing  Pt. States she can tolerate it when given with 50 mg Benadryl and ran over 3 hours    Neupogen [filgrastim] Other (See Comments)     Ostealgia after five daily doses of 300 mcg.      Bactrim [sulfamethoxazole-trimethoprim] Hives    Ceftazidime Hives     Pt stated can tolerate cefapine not ceftazidime    Ceftazidime     Dronabinol Other (See Comments)     Mental changes/hallucinations    Haldol [haloperidol lactate] Other (See Comments)     Seizure like activity    Nsaids (non-steroidal anti-inflammatory drug)      Cannot have due to lung transplant    Adhesive Rash     Cloth tape- please use tegaderm or paper tape    Aztreonam Rash    Ciprofloxacin Nausea And Vomiting     Projectile N/V, per patient.  Unwilling to retry therapy.       Review of Systems   Constitutional: Positive for fatigue. Negative for appetite change, chills, diaphoresis and fever.   HENT: Negative for congestion, postnasal drip, rhinorrhea, sinus pressure and sinus pain.    Respiratory: Positive for cough and shortness of breath. Negative for wheezing.    Cardiovascular: Positive for chest pain (incision site). Negative for palpitations and leg swelling.   Gastrointestinal: Negative for abdominal distention, abdominal pain, diarrhea, nausea and vomiting.   Genitourinary: Negative for decreased urine volume, difficulty urinating, dysuria and urgency.   Musculoskeletal: Positive for back pain (chronic). Negative for neck pain and neck stiffness.   Skin: Positive for pallor. Negative for rash and wound.   Allergic/Immunologic: Positive for immunocompromised state.   Neurological: Positive for weakness. Negative for dizziness, syncope and headaches.   Psychiatric/Behavioral: Negative for  confusion. The patient is nervous/anxious.      Objective:   Physical Exam   Constitutional: She is oriented to person, place, and time. She appears well-developed and well-nourished. She is easily aroused. No distress.   HENT:   Head: Normocephalic and atraumatic.   Right Ear: External ear normal.   Left Ear: External ear normal.   Nose: Nose normal.   Eyes: Conjunctivae and EOM are normal. No scleral icterus.   Neck: Normal range of motion. Neck supple. No JVD present.   Cardiovascular: Normal rate, regular rhythm and normal heart sounds. Exam reveals no friction rub.   No murmur heard.  Pulmonary/Chest: Effort normal. No accessory muscle usage. No respiratory distress. She has decreased breath sounds in the right middle field, the right lower field and the left lower field. She has no wheezes. She has no rhonchi. She has no rales.   Abdominal: Soft. Bowel sounds are normal. She exhibits no distension. There is no tenderness.   Musculoskeletal: Normal range of motion. She exhibits no edema.   Neurological: She is alert, oriented to person, place, and time and easily aroused.   Skin: Skin is warm and dry. She is not diaphoretic. No erythema.   Psychiatric: Her mood appears not anxious. Her speech is delayed and slurred. She is slowed. She is inattentive.   Nursing note and vitals reviewed.        Vital Signs (Most Recent):  Temp: 97.8 °F (36.6 °C) (07/09/19 1147)  Pulse: 78 (07/09/19 1147)  Resp: 18 (07/09/19 1147)  BP: 120/86 (07/09/19 1147)  SpO2: (!) 90 % (07/09/19 1147) Vital Signs (24h Range):  Temp:  [97.8 °F (36.6 °C)-99.5 °F (37.5 °C)] 97.8 °F (36.6 °C)  Pulse:  [69-82] 78  Resp:  [14-20] 18  SpO2:  [90 %-93 %] 90 %  BP: (120-145)/() 120/86     Weight: 58.7 kg (129 lb 6.6 oz)  Body mass index is 24.45 kg/m².      Intake/Output Summary (Last 24 hours) at 7/9/2019 1427  Last data filed at 7/9/2019 1100  Gross per 24 hour   Intake 700 ml   Output 1350 ml   Net -650 ml       Ventilator Data:           Hemodynamic Parameters:       Lines/Drains:       Introducer right internal jugular (Active)   Specific Qualities Capped 6/30/2019  7:10 AM   Dressing Status Clean;Dry;Biopatch in place;Intact 6/30/2019  7:10 AM   Dressing Intervention Dressing reinforced 6/29/2019  3:00 AM   Dressing Change Due 06/29/19 6/30/2019  3:00 AM   Daily Line Review Performed 6/30/2019  7:10 AM   Number of days:             Port A Cath Single Lumen 06/24/14 1200 right subclavian (Active)   Accessed by: Radha Lanza 6/24/2019  5:00 AM   Dressing Type Transparent 6/30/2019  7:10 AM   Dressing Status Biopatch in place;Clean;Dry;Intact 6/30/2019  7:10 AM   Dressing Intervention Dressing reinforced 6/29/2019  3:00 AM   Dressing Change Due 06/30/19 6/30/2019  3:00 AM   Line Status Infusing 6/30/2019  7:10 AM   Flush Performed Yes 6/30/2019  7:10 AM   Date to be Reflushed 06/18/19 6/29/2019  3:00 AM   Daily Line Review Performed 6/30/2019  7:10 AM   Type of Needle Nicole 6/30/2019  3:00 AM   Gauge 20 6/30/2019  3:00 AM   Needle Length 1 in 6/30/2019  3:00 AM   Needle Status Left in place 6/30/2019  3:00 AM   Needle Insertion Date 06/24/19 6/30/2019  3:00 AM   Needle Insertion Time 0500 6/26/2019  3:00 AM   Number of days: 1831            Percutaneous Central Line Insertion/Assessment - double lumen  06/18/19 0510 (Active)   Dressing biopatch in place;dressing dry and intact 6/30/2019  7:10 AM   Securement secured w/ sutures 6/30/2019  7:10 AM   Additional Site Signs no erythema;no warmth 6/30/2019  7:10 AM   Distal Patency/Care infusing 6/30/2019  7:10 AM   Proximal Patency/Care infusing 6/30/2019  7:10 AM   Waveform normal 6/30/2019  7:10 AM   Line Interventions line leveled/zeroed 6/30/2019  7:10 AM   Dressing Change Due 06/29/19 6/30/2019  3:00 AM   Daily Line Review Performed 6/30/2019  7:10 AM   Number of days: 12            Peripheral IV - Single Lumen 06/27/19 0917 22 G Right Forearm (Active)   Site Assessment Clean;Dry;Intact;No  redness;No swelling 6/30/2019  7:10 AM   Line Status Infusing 6/30/2019  7:10 AM   Dressing Status Clean;Dry;Intact 6/30/2019  7:10 AM   Dressing Intervention Dressing reinforced 6/29/2019  3:00 AM   Dressing Change Due 07/01/19 6/30/2019  3:00 AM   Site Change Due 07/01/19 6/29/2019  7:00 PM   Reason Not Rotated Not due 6/30/2019  7:10 AM   Number of days: 3            Chest Tube 06/29/19 1655 1 Right Fourth intercostal space;Midaxillary 7 Fr. (Active)   Chest Tube WDL WDL 6/30/2019  7:10 AM   Function -20 cm H2O 6/30/2019  7:10 AM   Air Leak/Fluctuation air leak present 6/30/2019  7:10 AM   Safety all tubing connections taped;2 rubber-tipped hemostats w/ patient;all connections secured;suction checked 6/30/2019  7:10 AM   Securement tubing secured to body distal to insertion site w/ tape 6/30/2019  7:10 AM   Patency Intervention Tip/tilt 6/30/2019  7:10 AM   Drainage Description Serosanguineous 6/30/2019  7:10 AM   Dressing Appearance occlusive gauze dressing intact 6/30/2019  7:10 AM   Dressing Care dressing changed 6/30/2019 10:00 AM   Left Subcutaneous Emphysema none present 6/30/2019  7:10 AM   Right Subcutaneous Emphysema none present 6/30/2019  7:10 AM   Site Assessment Clean;Dry;Intact;No redness;No swelling;Ecchymotic 6/30/2019 10:00 AM   Surrounding Skin Dry;Intact 6/30/2019  7:10 AM   Output (mL) 0 mL 6/30/2019 10:00 AM   Number of days: 0            Y Chest Tube 3 and 4 06/18/19 1151 3 Left Pleural 19 Fr. 4 Left Mediastinal 19 Fr. (Active)   Function -20 cm H2O 6/30/2019  7:10 AM   Air Leak/Fluctuation air leak not present 6/30/2019  7:10 AM   Safety all tubing connections taped;2 rubber-tipped hemostats w/ patient;all connections secured;suction checked 6/30/2019  7:10 AM   Securement tubing secured to body distal to insertion site w/ tape 6/30/2019  7:10 AM   Left Subcutaneous Emphysema none present 6/30/2019  7:10 AM   Right Subcutaneous Emphysema none present 6/30/2019  7:10 AM   Patency  Intervention Tip/tilt 6/30/2019  7:10 AM   Drainage Description 3 Serosanguineous 6/30/2019  7:10 AM   Tube 3 Dressing Appearance occlusive gauze dressing intact 6/30/2019  7:10 AM   Tube 3 Dressing Care dressing changed 6/30/2019 10:00 AM   Site Assessment 3 Clean;Dry;Intact 6/30/2019 10:00 AM   Surrounding Skin 3 Dry;Intact 6/30/2019  7:10 AM   Drainage Description 4 Serosanguineous 6/30/2019  7:10 AM   Tube 4 Dressing Appearance occlusive gauze dressing intact 6/30/2019  7:10 AM   Tube 4 Dressing Care dressing changed 6/30/2019 10:00 AM   Site Assessment 4 Clean;Dry;Intact 6/30/2019 10:00 AM   Surrounding Skin 4 Dry;Intact 6/30/2019  7:10 AM   Output (mL) 0 mL 6/30/2019 10:00 AM   Number of days: 11       Significant Labs:  CBC:  Recent Labs   Lab 07/09/19  0530   WBC 5.04   RBC 3.14*   HGB 8.9*   HCT 29.6*   *   MCV 94   MCH 28.3   MCHC 30.1*     BMP:  Recent Labs   Lab 07/09/19  0530      K 4.6      CO2 21*   BUN 14   CREATININE 1.2   CALCIUM 8.2*      Tacrolimus Levels:  Recent Labs   Lab 07/09/19  0530   TACROLIMUS 11.0     Microbiology:  Microbiology Results (last 7 days)     Procedure Component Value Units Date/Time    AFB Culture & Smear [[id number]] Collected:  07/05/19 1445    Order Status:  Completed Specimen:  Respiratory from Bronchial Wash Updated:  07/08/19 1540     AFB Culture & Smear Culture in progress     AFB CULTURE STAIN No acid fast bacilli seen.    Narrative:       Bronchial Wash    Culture, Respiratory [[id number]]  (Abnormal)  (Susceptibility) Collected:  07/05/19 1445    Order Status:  Completed Specimen:  Respiratory from Bronchial Wash Updated:  07/08/19 1100     Respiratory Culture No S aureus isolated.      PSEUDOMONAS AERUGINOSA   Moderate  Normal respiratory anibla also present       Gram Stain (Respiratory) Few WBC's     Gram Stain (Respiratory) Few Gram positive rods     Gram Stain (Respiratory) Few budding yeast    Narrative:       Bronchial Wash    Fungus  culture [[id number]] Collected:  07/05/19 1445    Order Status:  Sent Specimen:  Respiratory from Bronchial Wash Updated:  07/05/19 1505    Fungus culture [[id number]] Collected:  06/20/19 0808    Order Status:  Completed Specimen:  Respiratory from Bronchial Wash Updated:  07/03/19 1000     Fungus (Mycology) Culture Culture in progress      No fungus isolated after 2 weeks          I have reviewed all pertinent labs within the past 24 hours.    Diagnostic Results:  Chest X-Ray: Vascular catheters, right pleural drain and monitoring leads remain.  There is some high-density material in the upper abdomen likely from the modified barium swallow.  Heart is enlarged there is mild increase in the pulmonary vascular markings.  Persistent parenchymal opacification bilaterally.  No significant pneumothorax seen.  Probable right pleural effusion.           Assessment/Plan:     * S/P lung transplant  POD#21 s/p bilateral lung transplant (re-transplant) for CARLOS EDUARDO. 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 PGD 0. S/p bronch and extubated to HFNC on POD#2, but was re-intubated 6/21 for hypercapnic respiratory failure. Bronchoscopy 7/5 with  pseudomonas.     Continue PT/OT/IS/CPT/nebs. Continue immunosuppression and prophylaxis.    Immunosuppression  Previously on tacrolimus, MMF, and daily prednisone. S/p solumedrol in OR, basiliximab on POD#0 and POD#4. Continue MMF, tacrolimus, and steroid taper. Monitor daily tacrolimus levels and adjust dose as needed.       Prophylactic antibiotic  CMV D-/R+. Received Vanc/Merrem as routine surgical prophylaxis, which was transitioned to Cipro. Continue OIP with valganciclovir, fluconazole, and dapsone. Continue micafungin for Candida glabrata.    Acute blood loss anemia  Received 6u PRBCs, 2u plts, 1 cryo, and 4 FFP angie-operatively. Will continue to monitor and be conservative with future transfusions.     Anxiety disorder  Continue home dose of Effexor and Ativan.    Chronic pain with  opiate use  Dilaudid d/c'd over the weekend. Continue with scheduled morphine Q8hrs. Oxy 5 mg IR Q6hrs PRN available. Limit sedating medications.     CF related Pancreatic insufficiency  Continue Creon with meals and snacks.     Steroid-induced hyperglycemia  Endocrine following, appreciate recs.     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 Received Tobramycin x1 on 6/22 and was transitioned from Merrem to Cipro. BAL from 7/5 with  pseudomonas. Started on Zerbaxa 7/9 per ID recs.     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 Repeat BAL on 6/23 with Candida glabrata. Discussed with ID, Dr. Francis. Continue micafungin.          XIMENA RajputC  Lung Transplant  [facility]

## 2019-07-09 NOTE — ASSESSMENT & PLAN NOTE
POD#21 s/p bilateral lung transplant (re-transplant) for CARLOS EDUARDO. 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 PGD 0. S/p bronch and extubated to HFNC on POD#2, but was re-intubated 6/21 for hypercapnic respiratory failure. Bronchoscopy 7/5 with  pseudomonas.     Continue PT/OT/IS/CPT/nebs. Continue immunosuppression and prophylaxis.

## 2019-07-09 NOTE — PLAN OF CARE
Problem: Adult Inpatient Plan of Care  Goal: Plan of Care Review  Outcome: Ongoing (interventions implemented as appropriate)  Pt drowsy/lethargic at beginning of shift. Pt became more AAOx4 for medications. Held last night schedule morphine due to pt sleeping when it was 8 hours since the last time she received the medication. IV zofran given. IV cipro continued. BG monitored achs and tx per orders. Pt not eating much at all yesterday. Telemetry monitor in place (NSR). Pt remains on room air (>92%).  Self meds set up and ready to be pulled. Encouraging pt to perform IS while awake. Pt able to position self independently in bed. 2 person assist OOB to commode. Pt very weak on feet.  Pt in lowest position, side rails up x2, non-skid foot wear in place, call light within reach, pt verbalized understanding to call RN when needed. Hand hygiene practiced per protocol. Will continue to monitor.

## 2019-07-09 NOTE — PT/OT/SLP PROGRESS
Occupational Therapy      Patient Name:  Juanita Ibarra   MRN:  [id number]    Patient not seen today secondary to working with PT on 1st attempt at 11:06 am. OT unable to make 2nd attempt. Will follow-up on 7/10/19.    Ann Hood OT  7/9/2019

## 2019-07-09 NOTE — ASSESSMENT & PLAN NOTE
Received 6u PRBCs, 2u plts, 1 cryo, and 4 FFP angie-operatively. Will continue to monitor and be conservative with future transfusions.

## 2019-07-09 NOTE — SUBJECTIVE & OBJECTIVE
Subjective:     Interval History: No acute events overnight. Reports ongoing difficulty clearing secretions. Worked well with PT today. Not using IS as instructed. Sleeping more frequently during the day.       Continuous Infusions:    Scheduled Meds:   acetylcysteine 200 mg/ml (20%)  4 mL Nebulization Q12H    ceftolozane-tazobactam  1,500 mg Intravenous Q8H    dapsone  100 mg Oral Daily    enoxaparin  40 mg Subcutaneous Daily    famotidine  20 mg Oral BID    fluconazole  400 mg Oral Daily    gabapentin  300 mg Oral TID    insulin aspart U-100  4-6 Units Subcutaneous TIDWM    levalbuterol  1.25 mg Nebulization Q6H WAKE    lipase-protease-amylase 24,000-76,000-120,000 units  4 capsule Oral TID WM    metoprolol tartrate  25 mg Oral TID    micafungin (MYCAMINE) IVPB  100 mg Intravenous Q24H    morphine  15 mg Oral Q8H    mycophenolate  500 mg Oral BID    predniSONE  20 mg Oral Daily    QUEtiapine  50 mg Oral QHS    sodium chloride 7%  4 mL Nebulization Q12H    tacrolimus  0.5 mg Oral Daily    tacrolimus  1 mg Oral Daily    valGANciclovir  450 mg Oral BID    venlafaxine  150 mg Oral QHS    venlafaxine  37.5 mg Oral QHS     PRN Meds:sodium chloride, acetaminophen, bisacodyl, Dextrose 10% Bolus, Dextrose 10% Bolus, diphenhydrAMINE, glucagon (human recombinant), glucose, glucose, insulin aspart U-100, insulin aspart U-100, lactulose, lipase-protease-amylase 24,000-76,000-120,000 units, LORazepam, magnesium sulfate IVPB, metoclopramide HCl, metoprolol, naloxone, ondansetron, ondansetron, oxyCODONE, potassium chloride in water **AND** potassium chloride in water **AND** potassium chloride in water, promethazine (PHENERGAN) IVPB    Review of patient's allergies indicates:   Allergen Reactions    Albuterol Palpitations    Colistin Anaphylaxis    Vancomycin analogues      Infusion reaction that does not resolve with slowing  Pt. States she can tolerate it when given with 50 mg Benadryl and ran over 3  hours    Neupogen [filgrastim] Other (See Comments)     Ostealgia after five daily doses of 300 mcg.      Bactrim [sulfamethoxazole-trimethoprim] Hives    Ceftazidime Hives     Pt stated can tolerate cefapine not ceftazidime    Ceftazidime     Dronabinol Other (See Comments)     Mental changes/hallucinations    Haldol [haloperidol lactate] Other (See Comments)     Seizure like activity    Nsaids (non-steroidal anti-inflammatory drug)      Cannot have due to lung transplant    Adhesive Rash     Cloth tape- please use tegaderm or paper tape    Aztreonam Rash    Ciprofloxacin Nausea And Vomiting     Projectile N/V, per patient.  Unwilling to retry therapy.       Review of Systems   Constitutional: Positive for fatigue. Negative for appetite change, chills, diaphoresis and fever.   HENT: Negative for congestion, postnasal drip, rhinorrhea, sinus pressure and sinus pain.    Respiratory: Positive for cough and shortness of breath. Negative for wheezing.    Cardiovascular: Positive for chest pain (incision site). Negative for palpitations and leg swelling.   Gastrointestinal: Negative for abdominal distention, abdominal pain, diarrhea, nausea and vomiting.   Genitourinary: Negative for decreased urine volume, difficulty urinating, dysuria and urgency.   Musculoskeletal: Positive for back pain (chronic). Negative for neck pain and neck stiffness.   Skin: Positive for pallor. Negative for rash and wound.   Allergic/Immunologic: Positive for immunocompromised state.   Neurological: Positive for weakness. Negative for dizziness, syncope and headaches.   Psychiatric/Behavioral: Negative for confusion. The patient is nervous/anxious.      Objective:   Physical Exam   Constitutional: She is oriented to person, place, and time. She appears well-developed and well-nourished. She is easily aroused. No distress.   HENT:   Head: Normocephalic and atraumatic.   Right Ear: External ear normal.   Left Ear: External ear normal.    Nose: Nose normal.   Eyes: Conjunctivae and EOM are normal. No scleral icterus.   Neck: Normal range of motion. Neck supple. No JVD present.   Cardiovascular: Normal rate, regular rhythm and normal heart sounds. Exam reveals no friction rub.   No murmur heard.  Pulmonary/Chest: Effort normal. No accessory muscle usage. No respiratory distress. She has decreased breath sounds in the right middle field, the right lower field and the left lower field. She has no wheezes. She has no rhonchi. She has no rales.   Abdominal: Soft. Bowel sounds are normal. She exhibits no distension. There is no tenderness.   Musculoskeletal: Normal range of motion. She exhibits no edema.   Neurological: She is alert, oriented to person, place, and time and easily aroused.   Skin: Skin is warm and dry. She is not diaphoretic. No erythema.   Psychiatric: Her mood appears not anxious. Her speech is delayed and slurred. She is slowed. She is inattentive.   Nursing note and vitals reviewed.        Vital Signs (Most Recent):  Temp: 97.8 °F (36.6 °C) (07/09/19 1147)  Pulse: 78 (07/09/19 1147)  Resp: 18 (07/09/19 1147)  BP: 120/86 (07/09/19 1147)  SpO2: (!) 90 % (07/09/19 1147) Vital Signs (24h Range):  Temp:  [97.8 °F (36.6 °C)-99.5 °F (37.5 °C)] 97.8 °F (36.6 °C)  Pulse:  [69-82] 78  Resp:  [14-20] 18  SpO2:  [90 %-93 %] 90 %  BP: (120-145)/() 120/86     Weight: 58.7 kg (129 lb 6.6 oz)  Body mass index is 24.45 kg/m².      Intake/Output Summary (Last 24 hours) at 7/9/2019 1427  Last data filed at 7/9/2019 1100  Gross per 24 hour   Intake 700 ml   Output 1350 ml   Net -650 ml       Ventilator Data:          Hemodynamic Parameters:       Lines/Drains:       Introducer right internal jugular (Active)   Specific Qualities Capped 6/30/2019  7:10 AM   Dressing Status Clean;Dry;Biopatch in place;Intact 6/30/2019  7:10 AM   Dressing Intervention Dressing reinforced 6/29/2019  3:00 AM   Dressing Change Due 06/29/19 6/30/2019  3:00 AM   Daily Line  Review Performed 6/30/2019  7:10 AM   Number of days:             Port A Cath Single Lumen 06/24/14 1200 right subclavian (Active)   Accessed by: Radha Lanza 6/24/2019  5:00 AM   Dressing Type Transparent 6/30/2019  7:10 AM   Dressing Status Biopatch in place;Clean;Dry;Intact 6/30/2019  7:10 AM   Dressing Intervention Dressing reinforced 6/29/2019  3:00 AM   Dressing Change Due 06/30/19 6/30/2019  3:00 AM   Line Status Infusing 6/30/2019  7:10 AM   Flush Performed Yes 6/30/2019  7:10 AM   Date to be Reflushed 06/18/19 6/29/2019  3:00 AM   Daily Line Review Performed 6/30/2019  7:10 AM   Type of Needle Nicole 6/30/2019  3:00 AM   Gauge 20 6/30/2019  3:00 AM   Needle Length 1 in 6/30/2019  3:00 AM   Needle Status Left in place 6/30/2019  3:00 AM   Needle Insertion Date 06/24/19 6/30/2019  3:00 AM   Needle Insertion Time 0500 6/26/2019  3:00 AM   Number of days: 1831            Percutaneous Central Line Insertion/Assessment - double lumen  06/18/19 0510 (Active)   Dressing biopatch in place;dressing dry and intact 6/30/2019  7:10 AM   Securement secured w/ sutures 6/30/2019  7:10 AM   Additional Site Signs no erythema;no warmth 6/30/2019  7:10 AM   Distal Patency/Care infusing 6/30/2019  7:10 AM   Proximal Patency/Care infusing 6/30/2019  7:10 AM   Waveform normal 6/30/2019  7:10 AM   Line Interventions line leveled/zeroed 6/30/2019  7:10 AM   Dressing Change Due 06/29/19 6/30/2019  3:00 AM   Daily Line Review Performed 6/30/2019  7:10 AM   Number of days: 12            Peripheral IV - Single Lumen 06/27/19 0917 22 G Right Forearm (Active)   Site Assessment Clean;Dry;Intact;No redness;No swelling 6/30/2019  7:10 AM   Line Status Infusing 6/30/2019  7:10 AM   Dressing Status Clean;Dry;Intact 6/30/2019  7:10 AM   Dressing Intervention Dressing reinforced 6/29/2019  3:00 AM   Dressing Change Due 07/01/19 6/30/2019  3:00 AM   Site Change Due 07/01/19 6/29/2019  7:00 PM   Reason Not Rotated Not due 6/30/2019  7:10 AM    Number of days: 3            Chest Tube 06/29/19 1655 1 Right Fourth intercostal space;Midaxillary 7 Fr. (Active)   Chest Tube WDL WDL 6/30/2019  7:10 AM   Function -20 cm H2O 6/30/2019  7:10 AM   Air Leak/Fluctuation air leak present 6/30/2019  7:10 AM   Safety all tubing connections taped;2 rubber-tipped hemostats w/ patient;all connections secured;suction checked 6/30/2019  7:10 AM   Securement tubing secured to body distal to insertion site w/ tape 6/30/2019  7:10 AM   Patency Intervention Tip/tilt 6/30/2019  7:10 AM   Drainage Description Serosanguineous 6/30/2019  7:10 AM   Dressing Appearance occlusive gauze dressing intact 6/30/2019  7:10 AM   Dressing Care dressing changed 6/30/2019 10:00 AM   Left Subcutaneous Emphysema none present 6/30/2019  7:10 AM   Right Subcutaneous Emphysema none present 6/30/2019  7:10 AM   Site Assessment Clean;Dry;Intact;No redness;No swelling;Ecchymotic 6/30/2019 10:00 AM   Surrounding Skin Dry;Intact 6/30/2019  7:10 AM   Output (mL) 0 mL 6/30/2019 10:00 AM   Number of days: 0            Y Chest Tube 3 and 4 06/18/19 1151 3 Left Pleural 19 Fr. 4 Left Mediastinal 19 Fr. (Active)   Function -20 cm H2O 6/30/2019  7:10 AM   Air Leak/Fluctuation air leak not present 6/30/2019  7:10 AM   Safety all tubing connections taped;2 rubber-tipped hemostats w/ patient;all connections secured;suction checked 6/30/2019  7:10 AM   Securement tubing secured to body distal to insertion site w/ tape 6/30/2019  7:10 AM   Left Subcutaneous Emphysema none present 6/30/2019  7:10 AM   Right Subcutaneous Emphysema none present 6/30/2019  7:10 AM   Patency Intervention Tip/tilt 6/30/2019  7:10 AM   Drainage Description 3 Serosanguineous 6/30/2019  7:10 AM   Tube 3 Dressing Appearance occlusive gauze dressing intact 6/30/2019  7:10 AM   Tube 3 Dressing Care dressing changed 6/30/2019 10:00 AM   Site Assessment 3 Clean;Dry;Intact 6/30/2019 10:00 AM   Surrounding Skin 3 Dry;Intact 6/30/2019  7:10 AM    Drainage Description 4 Serosanguineous 6/30/2019  7:10 AM   Tube 4 Dressing Appearance occlusive gauze dressing intact 6/30/2019  7:10 AM   Tube 4 Dressing Care dressing changed 6/30/2019 10:00 AM   Site Assessment 4 Clean;Dry;Intact 6/30/2019 10:00 AM   Surrounding Skin 4 Dry;Intact 6/30/2019  7:10 AM   Output (mL) 0 mL 6/30/2019 10:00 AM   Number of days: 11       Significant Labs:  CBC:  Recent Labs   Lab 07/09/19 0530   WBC 5.04   RBC 3.14*   HGB 8.9*   HCT 29.6*   *   MCV 94   MCH 28.3   MCHC 30.1*     BMP:  Recent Labs   Lab 07/09/19 0530      K 4.6      CO2 21*   BUN 14   CREATININE 1.2   CALCIUM 8.2*      Tacrolimus Levels:  Recent Labs   Lab 07/09/19 0530   TACROLIMUS 11.0     Microbiology:  Microbiology Results (last 7 days)     Procedure Component Value Units Date/Time    AFB Culture & Smear [[id number]] Collected:  07/05/19 1445    Order Status:  Completed Specimen:  Respiratory from Bronchial Wash Updated:  07/08/19 1540     AFB Culture & Smear Culture in progress     AFB CULTURE STAIN No acid fast bacilli seen.    Narrative:       Bronchial Wash    Culture, Respiratory [[id number]]  (Abnormal)  (Susceptibility) Collected:  07/05/19 1445    Order Status:  Completed Specimen:  Respiratory from Bronchial Wash Updated:  07/08/19 1100     Respiratory Culture No S aureus isolated.      PSEUDOMONAS AERUGINOSA   Moderate  Normal respiratory anibal also present       Gram Stain (Respiratory) Few WBC's     Gram Stain (Respiratory) Few Gram positive rods     Gram Stain (Respiratory) Few budding yeast    Narrative:       Bronchial Wash    Fungus culture [[id number]] Collected:  07/05/19 1445    Order Status:  Sent Specimen:  Respiratory from Bronchial Wash Updated:  07/05/19 1505    Fungus culture [[id number]] Collected:  06/20/19 0808    Order Status:  Completed Specimen:  Respiratory from Bronchial Wash Updated:  07/03/19 1000     Fungus (Mycology) Culture Culture in progress      No fungus  isolated after 2 weeks          I have reviewed all pertinent labs within the past 24 hours.    Diagnostic Results:  Chest X-Ray: Vascular catheters, right pleural drain and monitoring leads remain.  There is some high-density material in the upper abdomen likely from the modified barium swallow.  Heart is enlarged there is mild increase in the pulmonary vascular markings.  Persistent parenchymal opacification bilaterally.  No significant pneumothorax seen.  Probable right pleural effusion.

## 2019-07-09 NOTE — SUBJECTIVE & OBJECTIVE
"Interval HPI:   Overnight events: Remains in TSU, NAEON. BG elevated yesterday evening requiring correction scale insulin.  Overall fairly well controlled with scheduled insulin plus correction scale. Prednisone 20 mg; steroids per primary.  Eating:   <25%  Nausea: Yes  Hypoglycemia and intervention: No  Fever: No  TPN and/or TF: No    BP (!) 142/104 (BP Location: Left arm, Patient Position: Lying)   Pulse 74   Temp 98.3 °F (36.8 °C) (Oral)   Resp 18   Ht 5' 1" (1.549 m)   Wt 58.7 kg (129 lb 6.6 oz)   LMP 10/02/2016   SpO2 (!) 92%   Breastfeeding? No   BMI 24.45 kg/m²     Labs Reviewed and Include    Recent Labs   Lab 07/09/19  0530      CALCIUM 8.2*   ALBUMIN 2.4*   PROT 5.4*      K 4.6   CO2 21*      BUN 14   CREATININE 1.2   ALKPHOS 122   ALT 21   AST 12   BILITOT 0.3     Lab Results   Component Value Date    WBC 5.04 07/09/2019    HGB 8.9 (L) 07/09/2019    HCT 29.6 (L) 07/09/2019    MCV 94 07/09/2019     (L) 07/09/2019     No results for input(s): TSH, FREET4 in the last 168 hours.  Lab Results   Component Value Date    HGBA1C 4.7 01/09/2019       Nutritional status:   Body mass index is 24.45 kg/m².  Lab Results   Component Value Date    ALBUMIN 2.4 (L) 07/09/2019    ALBUMIN 2.4 (L) 07/08/2019    ALBUMIN 2.3 (L) 07/07/2019     Lab Results   Component Value Date    PREALBUMIN 15 (L) 05/29/2014    PREALBUMIN 11 (L) 05/09/2014    PREALBUMIN 18 (L) 03/19/2014       Estimated Creatinine Clearance: 56.5 mL/min (based on SCr of 1.2 mg/dL).    Accu-Checks  Recent Labs     07/06/19  0832 07/06/19  1408 07/06/19  2304 07/07/19  0845 07/07/19  1316 07/07/19  1636 07/07/19  2103 07/08/19  0939 07/08/19  1715 07/08/19  2125   POCTGLUCOSE 242* 122* 305* 148* 139* 153* 162* 138* 243* 258*       Current Medications and/or Treatments Impacting Glycemic Control  Immunotherapy:    Immunosuppressants         Stop Route Frequency     tacrolimus capsule 1 mg      -- Oral Daily     tacrolimus " capsule 0.5 mg      -- Oral Daily     mycophenolate capsule 500 mg      -- Oral 2 times daily        Steroids:   Hormones (From admission, onward)    Start     Stop Route Frequency Ordered    07/02/19 0900  predniSONE tablet 20 mg      -- Oral Daily 07/01/19 1148    06/26/19 1827  dexAMETHasone sodium phos (PF) 10 mg/mL injection     Note to Pharmacy:  Created by cabinet override    06/27 0629 06/26/19 1827        Pressors:    Autonomic Drugs (From admission, onward)    None        Hyperglycemia/Diabetes Medications:   Antihyperglycemics (From admission, onward)    Start     Stop Route Frequency Ordered    07/06/19 1135  insulin aspart U-100 pen 3 Units      -- SubQ As needed (PRN) 07/06/19 1036    07/06/19 0815  insulin aspart U-100 pen 4-6 Units      -- SubQ 3 times daily with meals 07/06/19 0804    07/02/19 1543  insulin aspart U-100 pen 1-10 Units      -- SubQ Before meals & nightly PRN 07/02/19 1444

## 2019-07-09 NOTE — ASSESSMENT & PLAN NOTE
Dilaudid d/c'd over the weekend. Continue with scheduled morphine Q8hrs. Oxy 5 mg IR Q6hrs PRN available. Limit sedating medications.

## 2019-07-09 NOTE — ASSESSMENT & PLAN NOTE
Previously with  Pseudomonas from 6/20 and 6/23 bronchial wash. Received Tobramycin x1 on 6/22 and was transitioned from Merrem to Cipro. BAL from 7/5 with  pseudomonas. Started on Zerbaxa 7/9 per ID recs.

## 2019-07-09 NOTE — NURSING
Pt very drowsy, falling asleep while trying to eat lunch, and this RN noticed pt had trouble using fork and was missing the food, so asked pt if she was having difficulty with vision. Pt stated that she did.  notified this RN that pt has had blurry vision since the drowsiness began a few days ago, and that  is concerned that drowsiness is not r/t pain medications. Last pain med given was 5am/6am this AM.  explained that pt was on these pain meds at home and never behaved the way she has been these past few days. Notified KEARA DUARTE of everything noted above, PA stated to get ABG once pt returns from xray. Will cont to monitor.

## 2019-07-09 NOTE — PLAN OF CARE
Problem: Physical Therapy Goal  Goal: Physical Therapy Goal  Goals to be met by: 19    Patient will increase functional independence with mobility by performin. Supine <> sit with stand-by assistance.  2. Sit to stand transfer from bedside chair with minimum assistance.   3. Pt to perform stand pivot transfer with Tonio, with use of RW.  4. Gait  x 50 feet with Tonio, with use of RW.  5.  Pt to perform and be compliant with exercise program in sit, to reduce complication s/p surgery.    Outcome: Ongoing (interventions implemented as appropriate)  Goals remain appropriate; continue current POC.     Kari Suarez PT, DPT   2019  Pager: [phone]

## 2019-07-10 PROBLEM — Z74.09 IMPAIRED FUNCTIONAL MOBILITY AND ENDURANCE: Status: ACTIVE | Noted: 2019-01-01

## 2019-07-10 PROBLEM — B44.9 ASPERGILLUS: Status: ACTIVE | Noted: 2019-01-01

## 2019-07-10 PROBLEM — Z78.9 ON ENTERAL NUTRITION: Status: RESOLVED | Noted: 2019-01-01 | Resolved: 2019-01-01

## 2019-07-10 NOTE — PROGRESS NOTES
Patient was seen with Dr. Alvarez and Jacqueline Dumont PA-C.  Patient was OOB to chair on room air with her significant other at bedside.  Dr. Alvarez discussed the plan for possible transfer to an inpatient rehab facility based on the recommendations of the acute OT and PT teams.  Patient stated that she would prefer being discharged home.  Dr. Alvarez explained that discharge home was not the safest plan for patient at this time.  Full participation with all therapy activities was encouraged to improve strength and endurance.  Providers also addressed the plan for management of the patient's pain and anxiety.  The patient and her significant other verbalized their understanding of all information discussed.  Transplant coordinator will continue to follow with the multi-disciplinary team, and remains available to assist with patient/family care and education.

## 2019-07-10 NOTE — ASSESSMENT & PLAN NOTE
30yo woman w/a history of CF (c/b pancreatic insufficiency, sinus disease, MRSA/Pseudomonas colonization c/b numerous antibiotics allergies, and subsequent COPD/ESLD; s/p BOLT 6/12/2014, CMV D+/R-, simulect induction, on maintenance tacro/MMF/pred; c/b multiple episodes of MRSA/Pseudomonas pneumonia/sinusitis, C.glabrata early post-operative colonization 7/2014, A1 rejection 8/2014 s/p pulse SM, prior CMV reactivations, and subsequent grade 3 [facility] EDUARDO relisted for transplant) who was admitted on 6/17/2019 for redo bilateral lung transplant (s/p BOLT 6/18/2019, CMV D-/R+, basiliximab induction, on maintenance tacro/MMF/pred; c/b  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 ID has been consulted for recent fevers (on 7/5), SOB, and non-productive cough due to ongoing  Pseudomonas pneumonia (with increasing MDR pattern). She remains tenuous.    - will stop cipro and transition to empiric zerbaxa (anti-histamine premedication given ceftaz allergy in past -- d/w patient and )  - would continue micafungin given C.glabrata growth to protect anastomoses from involvement  - remainder of prophylaxis per protocol  - await pending added novel sensitivities on  Pseudomonas and azole sensitivities on C.glabrata  - contact precautions for   - would send blood cultures with additional fevers

## 2019-07-10 NOTE — PLAN OF CARE
Problem: Adult Inpatient Plan of Care  Goal: Plan of Care Review  Outcome: Ongoing (interventions implemented as appropriate)  Pt more alert and awake than yesterday, up with 1-2 assist to commode and chair. Pt up in chair all morning. Pt c/o back pain, prn oxycodone given with moderate relief. Pt c/o nausea throughout day, prn zofran given. Pt on IV zerbaxa q8hr, PO zyrtec given as pre-med; pt requesting IV benadryl for pre med instead but instructed pt that benadryl can be used if pt starts developing symptoms after IV abx given. Pt also continues on IV VFEND and micafungin. Mg replaced this am. Clamshell incision KENNETH w dermabond. BG monitoring, , pt stated will eat dinner later tonight when  gets back with food. On tele, NSR 70-80s. Plan of care reviewed w pt and . Pt in bed, bed in lowest position, wheels locked, call light in reach, instructed to call staff for assistance.

## 2019-07-10 NOTE — ASSESSMENT & PLAN NOTE
Positive aspergillus ag from BAL on 7/5. Voriconazole loading administered. Continue voriconazole.

## 2019-07-10 NOTE — PLAN OF CARE
Problem: Adult Inpatient Plan of Care  Goal: Plan of Care Review  Outcome: Ongoing (interventions implemented as appropriate)  Pt lethargic and tired for most of the shift. Pt did wake up to eat a little bit of food that  brought. Bg monitored achs- see previous note regarding bg. Telemetry monitor in place (NSR). Pt on room air. Pt complaining of pain- scheduled morphine given when pt was awake while eating. Pt, , charge nurse, and this nurse decided to hold Seroquel last night due to pt being super tired. IV voriconazole given. Premedicated pt with IV benadryl 30 minutes before administering Zerbaxa. Encouraging IS. Pt's  pulling self meds for the morning. Contact precautions maintained for . Pt 2 person max assist OOB. Pt in lowest position, side rails up x2, non-skid foot wear in place, call light within reach, pt verbalized understanding to call RN when needed. Hand hygiene practiced per protocol. Will continue to monitor.

## 2019-07-10 NOTE — SUBJECTIVE & OBJECTIVE
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     Past Surgical History:   Procedure Laterality Date    ABDOMINAL SURGERY      peg tube     BXYLLLUF-[id number] N/A 5/19/2015    Performed by [facility]y Dias Jr[facility] at [facility] OR    BIOPSY-BRONCHUS N/A 12/20/2016    Performed by Jake Alvarez MD at [facility] OR 2ND FLR    Bronchoscopy N/A 7/5/2019    Performed by Francisca Morin DO at [facility] OR 2ND [facility]    BRONCHOSCOPY Bilateral 12/11/2018    Performed by Francisca Morin DO at [facility] OR 2ND [facility]    BRONCHOSCOPY N/A 10/1/2018    Performed by Jake Alvarez MD at [facility] OR 2ND [facility]    BRONCHOSCOPY Bilateral 12/20/2016    Performed by Jake Alvarez MD at [facility] OR 2ND [facility]    BRONCHOSCOPY - flexible bronchoscopy with probable tissue biopsy CPT 01723 N/A 7/21/2015    Performed by Jake Alvarez MD at [facility] OR 2ND [facility]    BRONCHOSCOPY - flexible bronchoscopy with probable tissue biospy CPT 18206 N/A 10/20/2015    Performed by Jake Alvarez MD at [facility] OR 2ND [facility]    BRONCHOSCOPY - flexible bronchoscopy with tissue biopsy CPT 85751 N/A 9/29/2015    Performed by Jake Alvarez MD at [facility] OR 2ND [facility]    BRONCHOSCOPY - flexible bronchoscopy with tissue biopsy CPT 20603 N/A 5/26/2015    Performed by Jake Alvarez MD at [facility] OR 1ST FLR    BRONCHOSCOPY flexible bronchoscopy with tissue biopsy N/A 9/8/2014    Performed by Jake Alvarez MD at [facility] OR 2ND [facility]    BRONCHOSCOPY flexible with possible tissue biopsy N/A 8/8/2014    Performed by Jake Alvarez MD at [facility] OR 2ND [facility]     BRONCHOSCOPY, BAL, BIOPSIES N/A 3/20/2018    [facility]    BRONCHOSCOPY-FIBEROPTIC N/A 1/29/2016    Performed by Joann Cifuentes DO at [facility] OR [address] [place] [address]    BRONCHOSCOPY; Bronchoscopy with BAL and transbronchial biopsies under general anesthesia N/A 5/29/2018    [facility]    CHOLECYSTECTOMY  2016    CHOLECYSTECTOMY-LAPAROSCOPIC N/A 7/22/2016    Performed by Joshua Goldberg[facility]    COLONOSCOPY N/A 5/3/2019    Performed by Juancho Rich MD at [facility] ENDO (2ND FLR)    ENDOMETRIAL ABLATION  2015    KJB    ETHMOIDECTOMY Bilateral 4/9/2019    Performed by Adin Burks III, [facility] at [facility]    FESS      FESS Bilateral 10/21/2013    Performed by Jared Whatley MD at [facility] OR [address] [place] [address]    FESS, USING COMPUTER-ASSISTED NAVIGATION N/A 4/9/2019    Performed by Adin Burks III, MD at [facility] OR [address] [place] [address]    FINE NEEDLE ASPIRATION (FNA)  of a cervical lymphadenopathy  1/29/2016    Performed by Joann Cifuentes DO at [facility] OR [address] [place] [address]    flex bronchoscopy with probable tissue biopsy N/A 7/31/2014    Performed by [facility] Diagnostic Provider at [facility] OR [address] [place] [address]    flexible bronchoscopy with tissue biopsy N/A 12/11/2014    Performed by [facility]    HYSTERECTOMY  04/2017    [facility]    INJECTION-VOCAL CORD Left 6/24/2014    Performed by Jared Whatley MD at [facility] OR [address] [place] [address]    UERYAUTJY-NVMP-T-CATH to right chest and removal of portacath to left chests wall. Bilateral 6/24/2014    Performed by Zhen Dorado MD at [facility] OR [address] [place] [address]    LARYNX SURGERY  2016    LUNG TRANSPLANT Bilateral 6/12/14    MAXILLARY ANTROSTOMY  4/9/2019    Performed by Adin Burks III, MD at [facility] OR [address] [place] [address]    MYRINGOTOMY W/ TUBES Right 04/2017    MYRINGOTOMY WITH INSERTION OF PE TUBES Right 4/15/2017    Performed by Gary Null MD at [facility] OR [address] [place] [address]    PLACEMENT-TUBE-PEG  5/15/2014     [facility]    PORTACATH PLACEMENT Right     rt sc    REDO BILATERAL LUNG TRANSPLANT; CLAMSHELL Bilateral 6/18/2019    Performed by Jonathan Newby MD at [facility] OR [address] [place] [address]    REMOVAL-TUBE-PEG  5/15/2014    [facility]    RHC for lung re-transplant N/A 1/22/2019    Performed by Laura Rachel MD at [facility] CATH LAB    ROBOTIC ASSISTED LAPAROSCOPIC HYSTERECTOMY N/A 4/25/2017    Performed by Deny Dias Jr., MD at [facility] OR    SALPINGECTOMY Bilateral 2015    KJB    SALPINGECTOMY-LAPAROSCOPIC Bilateral 5/19/2015    Performed by Deny Dias Jr., MD at [facility] OR    SINUS SURGERY FUNCTIONAL ENDOSCOPIC WITH NAVIGATION Bilateral 4/3/2017    Performed by [facility]esar Olsen MD at [facility]    SINUS SURGERY FUNCTIONAL ENDOSCOPIC WITH NAVIGATION, 83267, 73601, 61029, 52064 Bilateral 2/5/2015    Performed by [facility]esar [facility]lsen [facility] at [facility]    SPHENOIDECTOMY Bilateral 4/9/2019    Performed by Adin Burks III, MD at [facility] OR [address] [place] [address]    THYROPLASTY - Medialization laryngoplasty, cricothyroid subluxation, arytenoid repositioning Left 8/2/2016    Performed by Gary Null MD at [facility] OR [facility]    TRANSPLANT-LUNG Bilateral 6/11/2014    Performed by Adolfo Huston MD at [facility] OR [address] [place] [address]     Review of patient's allergies indicates:   Allergen Reactions    Albuterol Palpitations    Colistin Anaphylaxis    Vancomycin analogues      Infusion reaction that does not resolve with slowing  Pt. States she can tolerate it when given with 50 mg Benadryl and ran over 3 hours    Neupogen [filgrastim] Other (See Comments)     Ostealgia after five daily doses of 300 mcg.      Bactrim [sulfamethoxazole-trimethoprim] Hives    Ceftazidime Hives     Pt stated can tolerate cefapine not ceftazidime    Ceftazidime     Dronabinol Other (See Comments)     Mental changes/hallucinations    Haldol [haloperidol lactate] Other  (See Comments)     Seizure like activity    Nsaids (non-steroidal anti-inflammatory drug)      Cannot have due to lung transplant    Adhesive Rash     Cloth tape- please use tegaderm or paper tape    Aztreonam Rash    Ciprofloxacin Nausea And Vomiting     Projectile N/V, per patient.  Unwilling to retry therapy.       Scheduled Medications:    acetylcysteine 200 mg/ml (20%)  4 mL Nebulization Q12H    ceftolozane-tazobactam  1,500 mg Intravenous Q8H    cetirizine  10 mg Oral Daily    dapsone  100 mg Oral Daily    enoxaparin  40 mg Subcutaneous Daily    famotidine  20 mg Oral BID    gabapentin  300 mg Oral TID    insulin aspart U-100  4-6 Units Subcutaneous TIDWM    levalbuterol  1.25 mg Nebulization Q6H WAKE    lipase-protease-amylase 24,000-76,000-120,000 units  4 capsule Oral TID WM    metoprolol tartrate  25 mg Oral TID    micafungin (MYCAMINE) IVPB  100 mg Intravenous Q24H    morphine  15 mg Oral Q8H    mycophenolate  500 mg Oral BID    predniSONE  20 mg Oral Daily    QUEtiapine  50 mg Oral QHS    sodium chloride 7%  4 mL Nebulization Q12H    valGANciclovir  450 mg Oral BID    venlafaxine  150 mg Oral QHS    venlafaxine  37.5 mg Oral QHS    voriconazole (VFEND) IVPB (wt < 83 kg)  6 mg/kg Intravenous Q12H    Followed by    voriconazole (VFEND) IVPB (wt < 83 kg)  4 mg/kg Intravenous Q12H       PRN Medications: sodium chloride, acetaminophen, bisacodyl, Dextrose 10% Bolus, Dextrose 10% Bolus, diphenhydrAMINE, glucagon (human recombinant), glucose, glucose, insulin aspart U-100, insulin aspart U-100, lactulose, lipase-protease-amylase 24,000-76,000-120,000 units, LORazepam, magnesium sulfate IVPB, metoclopramide HCl, metoprolol, naloxone, ondansetron, ondansetron, oxyCODONE, potassium chloride in water **AND** potassium chloride in water **AND** potassium chloride in water, promethazine (PHENERGAN) IVPB    Family History     Problem Relation (Age of Onset)    Cancer Maternal Grandmother     Diabetes Maternal Grandfather    Drug abuse Maternal Grandfather    Hypertension Maternal Grandmother    Thrombocytopenia Maternal Grandfather        Tobacco Use    Smoking status: Never Smoker    Smokeless tobacco: Never Used   Substance and Sexual Activity    Alcohol use: No     Comment: Has not had an alcoholic drink in more than 2 months.    Drug use: No    Sexual activity: Yes     Partners: Male     Birth control/protection: See Surgical Hx     Comment: current partner since Oct 2016     Review of Systems   Constitutional: Positive for activity change and fatigue. Negative for chills.   HENT: Negative for trouble swallowing and voice change.    Eyes: Negative for photophobia and visual disturbance.   Respiratory: Positive for cough and shortness of breath.         Secretions   Cardiovascular: Positive for chest pain. Negative for palpitations.   Gastrointestinal: Positive for nausea. Negative for vomiting.   Genitourinary: Negative for difficulty urinating and flank pain.   Musculoskeletal: Positive for arthralgias, back pain, gait problem and myalgias.   Skin: Negative for color change and rash.   Allergic/Immunologic: Positive for immunocompromised state.   Neurological: Positive for weakness. Negative for speech difficulty.   Psychiatric/Behavioral: Negative for confusion. The patient is nervous/anxious.      Objective:     Vital Signs (Most Recent):  Temp: 98.2 °F (36.8 °C) (07/10/19 0848)  Pulse: 86 (07/10/19 0848)  Resp: 18 (07/10/19 0848)  BP: (!) 138/97 (07/10/19 0848)  SpO2: (!) 93 % (07/10/19 0848)    Vital Signs (24h Range):  Temp:  [97.5 °F (36.4 °C)-98.2 °F (36.8 °C)] 98.2 °F (36.8 °C)  Pulse:  [66-86] 86  Resp:  [16-19] 18  SpO2:  [90 %-99 %] 93 %  BP: (120-139)/(85-97) 138/97     Body mass index is 24.45 kg/m².    Physical Exam   Constitutional: She appears well-developed and well-nourished. She appears lethargic. She is easily aroused.   HENT:   Head: Normocephalic and atraumatic.   Eyes:  Right eye exhibits no discharge. Left eye exhibits no discharge.   Neck: Neck supple.   Cardiovascular: Normal rate and intact distal pulses.   Pulmonary/Chest: Effort normal. No respiratory distress.   On RA   Abdominal: Soft. There is no tenderness.   Musculoskeletal: She exhibits no edema or deformity.   Generalized deconditioning    Neurological: She is easily aroused. She appears lethargic.   Easily arousable, follows commands but falls back asleep for exam     Skin: Skin is warm and dry.   Psychiatric: She has a normal mood and affect. Her behavior is normal.   Vitals reviewed.         Diagnostic Results:   Labs: Reviewed  ECG: Reviewed  X-Ray: Reviewed

## 2019-07-10 NOTE — PLAN OF CARE
Problem: Physical Therapy Goal  Goal: Physical Therapy Goal  Goals to be met by: 19    Patient will increase functional independence with mobility by performin. Supine <> sit with stand-by assistance.  2. Sit to stand transfer from bedside chair with contact guard assistance  3. Pt to perform stand pivot transfer with Tonio, with use of RW.  4. Gait  x 50 feet with Tonio, with use of RW.  5.  Pt to perform and be compliant with exercise program in sitting to improve BLE strength and endurance  Outcome: Ongoing (interventions implemented as appropriate)  Goals remain appropriate; continue current POC.     Kari Suarez PT, DPT   7/10/2019  Pager: [phone]

## 2019-07-10 NOTE — ASSESSMENT & PLAN NOTE
-s/p b/l lung transplant 6/18/19 2/2 progressive end stage CARLOS EDUARDO  -complicated by hypercapnic resp failure, now extubated  -Bronchoscopy 7/5 with  pseudomonas  -currently on RA with sat ~90%

## 2019-07-10 NOTE — ASSESSMENT & PLAN NOTE
CMV D-/R+. Received Vanc/Merrem as routine surgical prophylaxis, which was transitioned to Cipro. Continue OIP with valganciclovir and dapsone. Continue micafungin for Candida glabrata. Continue voriconazole for aspergillus.

## 2019-07-10 NOTE — SUBJECTIVE & OBJECTIVE
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       Past Surgical History:   Procedure Laterality Date    ABDOMINAL SURGERY      peg tube     QGEKNULC-EINHSSWLQHE-UNEXQTECKRUS N/A 5/19/2015    Performed by Deny Dias Jr., MD at [facility] [place], operated by [facility] OR    BIOPSY-BRONCHUS N/A 12/20/2016    Performed by Jake Alvarez MD at [facility] OR 2ND FLR    Bronchoscopy N/A 7/5/2019    Performed by Francisca Morin DO at [facility] OR 2ND FLR    BRONCHOSCOPY Bilateral 12/11/2018    Performed by Francisca Morin DO at [facility] OR 2ND FLR    BRONCHOSCOPY N/A 10/1/2018    Performed by Jake Alvarez MD at [facility] OR 2ND FLR    BRONCHOSCOPY Bilateral 12/20/2016    Performed by Jake Alvarez MD at [facility] OR 2ND FLR    BRONCHOSCOPY - flexible bronchoscopy with probable tissue biopsy CPT 31555 N/A 7/21/2015    Performed by Jake Alvarez MD at [facility] OR 2ND FLR    BRONCHOSCOPY - flexible bronchoscopy with probable tissue biospy CPT 15996 N/A 10/20/2015    Performed by Jake Alvarez MD at [facility] OR 2ND FLR    BRONCHOSCOPY - flexible bronchoscopy with tissue biopsy CPT 38577 N/A 9/29/2015    Performed by Jake Alvarez MD at [facility] OR 2ND FLR    BRONCHOSCOPY - flexible bronchoscopy with tissue biopsy CPT 94966 N/A 5/26/2015    Performed by Jake Alvarez MD at [facility] OR 1ST FLR    BRONCHOSCOPY flexible bronchoscopy with tissue biopsy N/A 9/8/2014    Performed by Jake Alvarez MD at [facility] OR 2ND FLR    BRONCHOSCOPY flexible with possible tissue biopsy N/A 8/8/2014    Performed by Jake Alvarez MD at [facility] OR 2ND FLR     BRONCHOSCOPY, BAL, BIOPSIES N/A 3/20/2018    [facility]    BRONCHOSCOPY-FIBEROPTIC N/A 1/29/2016    Performed by Joann Cifuentes DO at [facility] OR [address] [place] [address]    BRONCHOSCOPY; Bronchoscopy with BAL and transbronchial biopsies under general anesthesia N/A 5/29/2018    [facility]    CHOLECYSTECTOMY  2016    CHOLECYSTECTOMY-LAPAROSCOPIC N/A 7/22/2016    Performed by Joshua Goldberg[facility]    COLONOSCOPY N/A 5/3/2019    Performed by Juancho Rich MD at [facility] ENDO (2ND FLR)    ENDOMETRIAL ABLATION  2015    KJB    ETHMOIDECTOMY Bilateral 4/9/2019    Performed by Adin Burks III[facility] at [facility]    FESS      FESS Bilateral 10/21/2013    Performed by Jared Whatley MD at [facility] OR [address] [place] [address]    FESS, USING COMPUTER-ASSISTED NAVIGATION N/A 4/9/2019    Performed by Adin Burks III, MD at [facility] OR [address] [place] [address]    FINE NEEDLE ASPIRATION (FNA)  of a cervical lymphadenopathy  1/29/2016    Performed by Joann Cifuentes DO at [facility] OR [address] [place] [address]    flex bronchoscopy with probable tissue biopsy N/A 7/31/2014    Performed by [facility] Diagnostic Provider at [facility] OR [address] [place] [address]    flexible bronchoscopy with tissue biopsy N/A 12/11/2014    Performed [facility]    HYSTERECTOMY  04/2017    [facility]    INJECTION-VOCAL CORD Left 6/24/2014    Performed by Jared Whatley MD at [facility] OR [address] [place] [address]    DOEXDSABP-KOLE-F-CATH to right chest and removal of portacath to left chests wall. Bilateral 6/24/2014    Performed by Zhen Dorado MD at [facility] OR [address] [place] [address]    LARYNX SURGERY  2016    LUNG TRANSPLANT Bilateral 6/12/14    MAXILLARY ANTROSTOMY  4/9/2019    Performed by Adin Burks III, MD at [facility] OR [address] [place] [address]    MYRINGOTOMY W/ TUBES Right 04/2017    MYRINGOTOMY WITH INSERTION OF PE TUBES Right 4/15/2017    Performed by Gary Null MD at [facility] OR [address] [place] [address]    PLACEMENT-TUBE-PEG  5/15/2014     [facility]    PORTACATH PLACEMENT Right     rt sc    REDO BILATERAL LUNG TRANSPLANT; CLAMSHELL Bilateral 6/18/2019    Performed by Jonathan Newby MD at [facility] OR [address] [place] [address]    REMOVAL-TUBE-PEG  5/15/2014    [facility]    RHC for lung re-transplant N/A 1/22/2019    Performed by Laura Rachel MD at [facility] CATH LAB    ROBOTIC ASSISTED LAPAROSCOPIC HYSTERECTOMY N/A 4/25/2017    Performed by Deny Dias Jr[facility] at [facility] [place]    SALPINGECTOMY Bilateral 2015    [facility]    SALPINGECTOMY-LAPAROSCOPIC Bilateral 5/19/2015    Performed by Deny Dias Jr[facility] at [facility]    SINUS SURGERY FUNCTIONAL ENDOSCOPIC WITH NAVIGATION Bilateral 4/3/2017    [facility]    SINUS SURGERY FUNCTIONAL ENDOSCOPIC WITH NAVIGATION, 67907, 29081, 68125, 79749 Bilateral 2/5/2015    [facility]    SPHENOIDECTOMY Bilateral 4/9/2019    Performed by Adin Burks III, MD at [facility] OR [address] [place] [address]    THYROPLASTY - Medialization laryngoplasty, cricothyroid subluxation, arytenoid repositioning Left 8/2/2016    Performed by Gary Null MD at [facility] OR [facility] FLR    TRANSPLANT-LUNG Bilateral 6/11/2014    Performed by Adolfo Huston MD at [facility] OR [address] [place] [address]       Review of patient's allergies indicates:   Allergen Reactions    Albuterol Palpitations    Colistin Anaphylaxis    Vancomycin analogues      Infusion reaction that does not resolve with slowing  Pt. States she can tolerate it when given with 50 mg Benadryl and ran over 3 hours    Neupogen [filgrastim] Other (See Comments)     Ostealgia after five daily doses of 300 mcg.      Bactrim [sulfamethoxazole-trimethoprim] Hives    Ceftazidime Hives     Pt stated can tolerate cefapine not ceftazidime    Ceftazidime     Dronabinol Other (See Comments)     Mental changes/hallucinations    Haldol [haloperidol lactate] Other  (See Comments)     Seizure like activity    Nsaids (non-steroidal anti-inflammatory drug)      Cannot have due to lung transplant    Adhesive Rash     Cloth tape- please use tegaderm or paper tape    Aztreonam Rash    Ciprofloxacin Nausea And Vomiting     Projectile N/V, per patient.  Unwilling to retry therapy.       Medications:  Medications Prior to Admission   Medication Sig    metoprolol tartrate (LOPRESSOR) 25 MG tablet Take 1 tablet (25 mg total) by mouth 2 (two) times daily.    amLODIPine (NORVASC) 10 MG tablet Take 1 tablet (10 mg total) by mouth once daily.    butalbital-acetaminophen-caffeine -40 mg (FIORICET, ESGIC) -40 mg per tablet Take 1 tablet by mouth every 6 (six) hours as needed for Pain.    calcium-vitamin D3 (OS-KATY 500 + D3) 500 mg(1,250mg) -200 unit per tablet Take 1 tablet by mouth 2 (two) times daily with meals.    dapsone 100 MG Tab Take 1 tablet (100 mg total) by mouth once daily.    diphenhydrAMINE (BENADRYL) 50 MG tablet Take 1 tablet (50 mg total) by mouth every 6 (six) hours as needed for Itching.    fexofenadine (ALLEGRA) 180 MG tablet Take 180 mg by mouth once daily.    fluconazole (DIFLUCAN) 150 MG Tab TAKE 1 TABLET BY MOUTH EVERY WEEK    fluticasone propionate (FLONASE) 50 mcg/actuation nasal spray INSERT 2 SPRAYS IN EACH NOSTRIL DAILY    folic acid (FOLVITE) 1 MG tablet Take 1 tablet (1,000 mcg total) by mouth once daily.    gabapentin (NEURONTIN) 300 MG capsule Take 1 capsule (300 mg total) by mouth 3 (three) times daily.    inhalation spacing device (AXS-OneBER) USE AS DIRECTED    levalbuterol (XOPENEX HFA) 45 mcg/actuation inhaler Inhale 1-2 puffs into the lungs.    levalbuterol (XOPENEX) 1.25 mg/3 mL nebulizer solution Take 3 mLs (1.25 mg total) by nebulization every 8 (eight) hours as needed for Wheezing or Shortness of Breath. Rescue    lipase-protease-amylase (CREON) 36,000-114,000- 180,000 unit CpDR Take 4 capsules by mouth 3 (three) times  daily with meals. Take 3 with snacks as needed. (Patient taking differently: Take 5 capsules by mouth 3 (three) times daily with meals. )    LORazepam (ATIVAN) 2 MG Tab Take 1 tablet by mouth every 8 hours as needed for ANXIETY    magnesium oxide (MAG-OX) 400 mg (241.3 mg magnesium) tablet Take 1 tablet (400 mg total) by mouth 2 (two) times daily.    mometasone-formoterol (DULERA) 100-5 mcg/actuation HFAA Inhale 1 puff into the lungs 2 (two) times daily. Controller    montelukast (SINGULAIR) 10 mg tablet Take 1 tablet (10 mg total) by mouth once daily.    morphine (MS CONTIN) 15 MG 12 hr tablet Take 1 tablet by mouth twice a day    multivit,min52-folic-vitK-cQ10 (AQUADEKS) 100-700-10 mcg-mcg-mg Cap cap 1 tab twice daily    multivitamin-minerals no.55 (CENTRUM FLAVOR BURST ADULT) Chew     mycophenolate (CELLCEPT) 250 mg Cap Take 2 capsules (500 mg total) by mouth 2 (two) times daily.    omeprazole (PRILOSEC) 40 MG capsule Take 40 mg by mouth every morning.    ondansetron (ZOFRAN) 8 MG tablet Take 1 tablet (8 mg total) by mouth every 12 (twelve) hours as needed for Nausea.    oxycodone (ROXICODONE) 5 MG immediate release tablet Take 10 mg by mouth every 4 (four) hours as needed for Pain.    oxyCODONE (ROXICODONE) 5 MG immediate release tablet take 1 tablet by mouth twice a day    polyethylene glycol (GLYCOLAX) 17 gram PwPk Take 17 g by mouth 2 (two) times daily.    predniSONE (DELTASONE) 5 MG tablet Take 1 tablet (5 mg total) by mouth once daily.    promethazine (PHENERGAN) 25 MG tablet Take 1 tablet (25 mg total) by mouth every 6 (six) hours as needed for Nausea.    QUEtiapine (SEROQUEL) 25 MG Tab Take 1 tablet by mouth in morning and 2 tablets at bedtime    sodium chloride 3% 3 % nebulizer solution Take 4 mLs by nebulization once daily.    tacrolimus (PROGRAF) 0.5 MG Cap Take 1 capsule (0.5 mg total) by mouth every 12 (twelve) hours.    tacrolimus (PROGRAF) 1 MG Cap Take 2 capsules (2 mg total) by  mouth every 12 (twelve) hours. Daily doses: 2.5 mg twice daily.    tiZANidine (ZANAFLEX) 4 MG tablet TAKE 2 TABLETS BY MOUTH EVERY 6 HOURS AS NEEDED    tobramycin with nebulizer 300 mg/5 mL Nebu Take 300 mg by nebulization every 12 (twelve) hours.    topiramate (TOPAMAX) 25 MG tablet Take 1 tablet (25 mg total) by mouth 2 (two) times daily    venlafaxine (EFFEXOR-XR) 150 MG Cp24 Take 1 capsule (150 mg total) by mouth once daily.    venlafaxine (EFFEXOR-XR) 37.5 MG 24 hr capsule Take 1 capsule (37.5 mg total) by mouth once daily.     Antibiotics (From admission, onward)    Start     Stop Route Frequency Ordered    07/09/19 1330  ceftolozane-tazobactam (ZERBAXA) 1,500 mg in dextrose 5 % 100 mL      -- IV Every 8 hours (non-standard times) 07/09/19 1219    07/05/19 1800  dapsone tablet 100 mg      -- Oral Daily 07/05/19 1305        Antifungals (From admission, onward)    Start     Stop Route Frequency Ordered    07/10/19 1745  voriconazole (VFEND) 230 mg in dextrose 5 % 100 mL IVPB      -- IV Every 12 hours (non-standard times) 07/09/19 1634    07/09/19 1745  voriconazole (VFEND) 350 mg in dextrose 5 % 100 mL IVPB      07/10 1744 IV Every 12 hours (non-standard times) 07/09/19 1634    07/06/19 1215  micafungin 100 mg in sodium chloride 0.9 % 100 mL IVPB (ready to mix system)      -- IV Every 24 hours (non-standard times) 07/06/19 1109        Antivirals (From admission, onward)        Stop Route Frequency     valGANciclovir      -- Oral 2 times daily           Immunization History   Administered Date(s) Administered    Cytomegalovirus Immune Globulin 06/13/2014    DTP 08/23/1994, 08/16/1995    Hepatitis A, Adult 04/04/2018, 01/22/2019    Hepatitis B 08/10/2000, 11/20/2000    Hepatitis B, Pediatric/Adolescent 11/28/2001    Influenza 09/12/2018    Influenza - Quadrivalent - PF 10/04/2017, 09/06/2018    Influenza - Trivalent (ADULT) 11/28/2001, 01/22/2002, 12/10/2002, 11/26/2003, 10/19/2004, 11/15/2005,  11/11/2006, 10/15/2007, 10/28/2009, 11/03/2010, 10/19/2011    Influenza - Trivalent - PF (ADULT) 10/14/2008, 10/22/2012, 11/02/2016    Influenza A (H1N1) 2009 Monovalent - IM - PF 12/01/2009    MMR 08/23/1994, 08/16/1995    Meningococcal Conjugate (MCV4P) 10/24/2008    OPV 08/23/1994, 08/16/1995, 08/10/2000    Pneumococcal Conjugate - 13 Valent 10/02/2017    Pneumococcal Conjugate - 7 Valent 01/22/2002    Pneumococcal Polysaccharide - 23 Valent 01/22/2002    Td (ADULT) 08/10/2000, 12/16/2010    Tdap 01/22/2019    Typhoid - ViCPs 04/06/2018       Family History     Problem Relation (Age of Onset)    Cancer Maternal Grandmother    Diabetes Maternal Grandfather    Drug abuse Maternal Grandfather    Hypertension Maternal Grandmother    Thrombocytopenia Maternal Grandfather        Social History     Socioeconomic History    Marital status: Single     Spouse name: Not on file    Number of children: Not on file    Years of education: Not on file    Highest education level: Not on file   Occupational History    Not on file   Social Needs    Financial resource strain: Not on file    Food insecurity:     Worry: Not on file     Inability: Not on file    Transportation needs:     Medical: Not on file     Non-medical: Not on file   Tobacco Use    Smoking status: Never Smoker    Smokeless tobacco: Never Used   Substance and Sexual Activity    Alcohol use: No     Comment: Has not had an alcoholic drink in more than 2 months.    Drug use: No    Sexual activity: Yes     Partners: Male     Birth control/protection: See Surgical Hx     Comment: current partner since Oct 2016   Lifestyle    Physical activity:     Days per week: Not on file     Minutes per session: Not on file    Stress: Not on file   Relationships    Social connections:     Talks on phone: Not on file     Gets together: Not on file     Attends [employer] service: Not on file     Active member of club or organization: Not on file     Attends  meetings of clubs or organizations: Not on file     Relationship status: Not on file   Other Topics Concern    Not on file   Social History Narrative    Not on file     Review of Systems   Constitutional: Positive for fatigue. Negative for appetite change, chills, diaphoresis and fever.   HENT: Negative for congestion, postnasal drip, rhinorrhea, sinus pressure and sinus pain.    Respiratory: Positive for cough and shortness of breath. Negative for wheezing.    Cardiovascular: Positive for chest pain (incision site). Negative for palpitations and leg swelling.   Gastrointestinal: Negative for abdominal distention, abdominal pain, diarrhea, nausea and vomiting.   Genitourinary: Negative for decreased urine volume, difficulty urinating, dysuria and urgency.   Musculoskeletal: Positive for back pain (chronic). Negative for neck pain and neck stiffness.   Skin: Positive for pallor. Negative for rash and wound.   Allergic/Immunologic: Positive for immunocompromised state.   Neurological: Positive for weakness. Negative for dizziness, syncope and headaches.   Psychiatric/Behavioral: Negative for confusion. The patient is nervous/anxious.      Objective:     Vital Signs (Most Recent):  Temp: 97.8 °F (36.6 °C) (07/09/19 1147)  Pulse: 68 (07/09/19 1517)  Resp: 16 (07/09/19 1516)  BP: (!) 129/92 (07/09/19 1516)  SpO2: (!) 92 % (07/09/19 1516) Vital Signs (24h Range):  Temp:  [97.8 °F (36.6 °C)-99.5 °F (37.5 °C)] 97.8 °F (36.6 °C)  Pulse:  [68-81] 68  Resp:  [14-20] 16  SpO2:  [90 %-93 %] 92 %  BP: (120-145)/() 129/92     Weight: 58.7 kg (129 lb 6.6 oz)  Body mass index is 24.45 kg/m².    Estimated Creatinine Clearance: 56.5 mL/min (based on SCr of 1.2 mg/dL).    Physical Exam   Constitutional: She appears well-developed and well-nourished. She is easily aroused. No distress.   HENT:   Head: Normocephalic and atraumatic.   Right Ear: External ear normal.   Left Ear: External ear normal.   Nose: Nose normal.   Eyes:  Conjunctivae and EOM are normal. No scleral icterus.   Neck: Normal range of motion. Neck supple. No JVD present.   Cardiovascular: Normal rate, regular rhythm and normal heart sounds. Exam reveals no friction rub.   No murmur heard.  Pulmonary/Chest: Effort normal. No accessory muscle usage. No respiratory distress. She has decreased breath sounds in the right middle field, the right lower field and the left lower field. She has no wheezes. She has no rhonchi. She has no rales.   Abdominal: Soft. Bowel sounds are normal. She exhibits no distension. There is no tenderness.   Musculoskeletal: Normal range of motion. She exhibits no edema.   Neurological: She is alert and easily aroused.   Medication-induced confusion.   Skin: Skin is warm and dry. She is not diaphoretic. No erythema.   Psychiatric: Her mood appears not anxious. Her speech is delayed and slurred. She is slowed. She is inattentive.   Nursing note and vitals reviewed.      Significant Labs:   CBC:   Recent Labs   Lab 07/08/19  0530 07/09/19  0530   WBC 5.19 5.04   HGB 8.6* 8.9*   HCT 28.2* 29.6*   * 130*     CMP:   Recent Labs   Lab 07/08/19  0530 07/09/19  0530    137   K 4.7 4.6    108   CO2 19* 21*   * 108   BUN 14 14   CREATININE 1.3 1.2   CALCIUM 8.0* 8.2*   PROT 5.2* 5.4*   ALBUMIN 2.4* 2.4*   BILITOT 0.3 0.3   ALKPHOS 128 122   AST 13 12   ALT 25 21   ANIONGAP 10 8   EGFRNONAA 55.2* >60.0       Significant Imaging: I have reviewed all pertinent imaging results/findings within the past 24 hours.

## 2019-07-10 NOTE — ASSESSMENT & PLAN NOTE
Previously on tacrolimus, MMF, and daily prednisone. S/p solumedrol in OR, basiliximab on POD#0 and POD#4. Continue MMF and steroid taper. Will continue to hold tacrolimus for now while awaiting for recent voriconazole loading to take effect. Monitor daily tacrolimus levels and adjust dose as needed.

## 2019-07-10 NOTE — HOSPITAL COURSE
07/8/2019: OT-Bed mobility CGA-MaxA .  Sit to stand Alexandru & RW and transfers Min-modA.  Ambulated 3 steps Alexandru & RW with complaints of fatigue.  Toileting Alexandru and LBD SV.  07/9/2019:  PT-Sit to stand ModA.  Ambulated 3 steps + 3 steps + 4 steps ModA & RW. On each trial, pt required mod-max A to control descent into sitting in chair 2/2 sudden descent, which pt attributed to fatigue.    07/10/2019: Sit to stand Alexandru-ModA and transfers ModA. Unable to take steps 2/2 pain and weakness.    7/15/19: PT- Bed mobility SBA.  Sit to stand SBA and transfers CGA & RW.  Ambulated 14 ft, 110ft, and 176 ft CGA & RW.

## 2019-07-10 NOTE — CONSULTS
[facility]  Infectious Disease  Consult Note    Patient Name: Juanita Ibarra  MRN: [id number]  Admission Date: 6/17/2019  Hospital Length of Stay: 21 days  Attending Physician: Francisca Morin DO  Primary Care Provider: Primary Doctor Beth     Isolation Status: Contact and Neutropenic    Patient information was obtained from patient and past medical records.      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  28yo woman w/a history of CF (c/b pancreatic insufficiency, sinus disease, MRSA/Pseudomonas colonization c/b numerous antibiotics allergies, and subsequent COPD/ESLD; s/p BOLT 6/12/2014, CMV D+/R-, simulect induction, on maintenance tacro/MMF/pred; c/b multiple episodes of MRSA/Pseudomonas pneumonia/sinusitis, C.glabrata early post-operative colonization 7/2014, A1 rejection 8/2014 s/p pulse SM, prior CMV reactivations, and subsequent grade 3 CARLOS EDUARDO relisted for transplant) who was admitted on 6/17/2019 for redo bilateral lung transplant (s/p BOLT 6/18/2019, CMV D-/R+, basiliximab induction, on maintenance tacro/MMF/pred; c/b  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 ID has been consulted for recent fevers (on 7/5), SOB, and non-productive cough due to ongoing  Pseudomonas pneumonia (with increasing MDR pattern). She remains tenuous.    - will stop cipro and transition to empiric zerbaxa (anti-histamine premedication given ceftaz allergy in past -- d/w patient and )  - would continue micafungin given C.glabrata growth to protect anastomoses from involvement  - remainder of prophylaxis per protocol  - await pending added novel sensitivities on  Pseudomonas and  azole sensitivities on C.glabrata  - contact precautions for   - would send blood cultures with additional fevers        Thank you for your consult. I will follow-up with patient. Please contact us if you have any additional questions.     Juanita Francis MD  Transplant ID Attending  [id number]    Juanita Francis MD  Infectious Disease  [facility]    Subjective:     Principal Problem: S/P lung transplant    HPI: Mrs. Ibarra is a 30yo woman w/a history of CF (c/b pancreatic insufficiency, sinus disease, MRSA/Pseudomonas colonization c/b numerous antibiotics allergies, and subsequent COPD/ESLD; s/p BOLT 6/12/2014, CMV D+/R-, simulect induction, on maintenance tacro/MMF/pred; c/b multiple episodes of MRSA/Pseudomonas pneumonia/sinusitis, C.glabrata early post-operative colonization 7/2014, A1 rejection 8/2014 s/p pulse SM, prior CMV reactivations, and subsequent grade 3 CARLOS EDUARDO relisted for transplant) who was admitted on 6/17/2019 for redo bilateral lung transplant (s/p BOLT 6/18/2019, CMV D-/R+, basiliximab induction, on maintenance tacro/MMF/pred; c/b  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 ID has been consulted for recent fevers (on 7/5), SOB, and non-productive cough due to ongoing  Pseudomonas pneumonia (with increasing MDR pattern).     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    Cystic fibrosis of the lung     Ear infection     Hypertension     Migraine headache     MRSA (methicillin resistant Staphylococcus aureus) carrier     Osteopenia     Other specified disease of pancreas     Pain disorder     Seizures 2013    Sinusitis, chronic     Vocal cord paralysis 06/2014    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       Past Surgical History:   Procedure Laterality Date    ABDOMINAL SURGERY      peg tube      [id number] N/A 5/19/2015    Performed by Deny Dias Jr., MD at [facility] OR    BIOPSY-BRONCHUS N/A 12/20/2016    Performed by Jake Alvarez MD at [facility] OR 2ND FLR    Bronchoscopy N/A 7/5/2019    Performed by Francisca Morin DO at [facility] OR 2ND FLR    BRONCHOSCOPY Bilateral 12/11/2018    Performed by Francisca Morin DO at [facility] OR 2ND FLR    BRONCHOSCOPY N/A 10/1/2018    Performed by Jake Alvarez MD at [facility] OR 2ND FLR    BRONCHOSCOPY Bilateral 12/20/2016    Performed by Jake Alvarez MD at [facility] OR 2ND FLR    BRONCHOSCOPY - flexible bronchoscopy with probable tissue biopsy CPT 99591 N/A 7/21/2015    Performed by Jake Alvarez MD at [facility] OR 2ND FLR    BRONCHOSCOPY - flexible bronchoscopy with probable tissue biospy CPT 86205 N/A 10/20/2015    Performed by Jake Alvarez MD at [facility] OR 2ND FLR    BRONCHOSCOPY - flexible bronchoscopy with tissue biopsy CPT 99725 N/A 9/29/2015    Performed by Jake Alvarez MD at [facility] OR 2ND FLR    BRONCHOSCOPY - flexible bronchoscopy with tissue biopsy CPT 29826 N/A 5/26/2015    Performed by Jake Alvarez MD at [facility] OR 1ST FLR    BRONCHOSCOPY flexible bronchoscopy with tissue biopsy N/A 9/8/2014    Performed by Jake Alvarez MD at [facility] OR 2ND FLR    BRONCHOSCOPY flexible with possible tissue biopsy N/A 8/8/2014    Performed by Jake Alvarez MD at [facility] OR 2ND FLR    BRONCHOSCOPY, BAL, BIOPSIES N/A 3/20/2018    Performed by Jake Alvarez MD at [facility] OR 2ND FLR    BRONCHOSCOPY-FIBEROPTIC N/A 1/29/2016    Performed by Joann Cifuentes DO at [facility] OR 2ND FLR    BRONCHOSCOPY; Bronchoscopy with BAL and transbronchial biopsies under general anesthesia N/A 5/29/2018    Performed by Jake Alvarez MD at [facility] OR 2ND FLR    CHOLECYSTECTOMY  2016    CHOLECYSTECTOMY-LAPAROSCOPIC N/A 7/22/2016    Performed by Joshua Goldberg, MD at [facility] OR 2ND FLR    COLONOSCOPY N/A 5/3/2019    Performed by Juancho BRENNAN  MD Hilario at [facility] ENDO (2ND FLR)    ENDOMETRIAL ABLATION  2015    KJB    ETHMOIDECTOMY Bilateral 4/9/2019    Performed by Birdsnestronni Burks III, MD at [facility] OR 2ND FLR    FESS      FESS Bilateral 10/21/2013    Performed by Jared Whatley MD at [facility] OR 2ND FLR    FESS, USING COMPUTER-ASSISTED NAVIGATION N/A 4/9/2019    Performed by Birdsnestronni Burks III, MD at [facility] OR 2ND [facility]    FINE NEEDLE ASPIRATION (FNA)  of a cervical lymphadenopathy  1/29/2016    Performed by Joann Cifuentes DO at [facility] OR 2ND FLR    flex bronchoscopy with probable tissue biopsy N/A 7/31/2014    Performed by [facility] Provider at [facility] OR 2ND [facility]    flexible bronchoscopy with tissue biopsy N/A 12/11/2014    Performed by Jake Alvarez MD at [facility] OR 2ND FLR    HYSTERECTOMY  04/2017    [facility]    INJECTION-VOCAL CORD Left 6/24/2014    Performed by Jared Whatley MD at [facility] OR 2ND FLR    [id number]-CATH to right chest and removal of portacath to left chests wall. Bilateral 6/24/2014    Performed by Zhen Dorado MD at [facility] OR 2ND FLR    LARYNX SURGERY  2016    LUNG TRANSPLANT Bilateral 6/12/14    MAXILLARY ANTROSTOMY  4/9/2019    Performed by Adin Burks III, MD at [facility] OR 2ND FLR    MYRINGOTOMY W/ TUBES Right 04/2017    MYRINGOTOMY WITH INSERTION OF PE TUBES Right 4/15/2017    Performed by Gary Null MD at [facility] OR 2ND FLR    PLACEMENT-TUBE-PEG  5/15/2014    Performed by David Moses MD at [facility] OR 2ND FLR    PORTACATH PLACEMENT Right     rt sc    REDO BILATERAL LUNG TRANSPLANT; CLAMSHELL Bilateral 6/18/2019    Performed by Jonathan Newby MD at [facility] OR 2ND FLR    REMOVAL-TUBE-PEG  5/15/2014    Performed by David Moses MD at [facility] OR 2ND FLR    RHC for lung re-transplant N/A 1/22/2019    Performed by Laura Rachel MD at [facility] CATH LAB    ROBOTIC ASSISTED LAPAROSCOPIC HYSTERECTOMY N/A 4/25/2017    Performed by Deny Dias Jr., MD at [facility] OR    SALPINGECTOMY Bilateral 2015     [facility]    SALPINGECTOMY-LAPAROSCOPIC Bilateral 5/19/2015    Performed by [facility]y [facility]as Jr[facility] at [facility] OR    SINUS SURGERY FUNCTIONAL ENDOSCOPIC WITH NAVIGATION Bilateral 4/3/2017    Performed by Cesar Olsen MD at [facility] OR [address] [place] [address]    SINUS SURGERY FUNCTIONAL ENDOSCOPIC WITH NAVIGATION, 31431, 72762, 67618, 78148 Bilateral 2/5/2015    Performed by Cesar Olsen MD at [facility] OR [address] [place] [address]    SPHENOIDECTOMY Bilateral 4/9/2019    Performed by Adin Burks III, MD at [facility] OR [address] [place] [address]    THYROPLASTY - Medialization laryngoplasty, cricothyroid subluxation, arytenoid repositioning Left 8/2/2016    Performed by Gary Null MD at [facility] OR [address] [place] [address]    TRANSPLANT-LUNG Bilateral 6/11/2014    Performed by Adolfo Huston MD at [facility] OR [address] [place] [address]       Review of patient's allergies indicates:   Allergen Reactions    Albuterol Palpitations    Colistin Anaphylaxis    Vancomycin analogues      Infusion reaction that does not resolve with slowing  Pt. States she can tolerate it when given with 50 mg Benadryl and ran over 3 hours    Neupogen [filgrastim] Other (See Comments)     Ostealgia after five daily doses of 300 mcg.      Bactrim [sulfamethoxazole-trimethoprim] Hives    Ceftazidime Hives     Pt stated can tolerate cefapine not ceftazidime    Ceftazidime     Dronabinol Other (See Comments)     Mental changes/hallucinations    Haldol [haloperidol lactate] Other (See Comments)     Seizure like activity    Nsaids (non-steroidal anti-inflammatory drug)      Cannot have due to lung transplant    Adhesive Rash     Cloth tape- please use tegaderm or paper tape    Aztreonam Rash    Ciprofloxacin Nausea And Vomiting     Projectile N/V, per patient.  Unwilling to retry therapy.       Medications:  Medications Prior to Admission   Medication Sig    metoprolol tartrate (LOPRESSOR) 25 MG tablet Take 1 tablet (25 mg total) by mouth 2 (two) times daily.    amLODIPine (NORVASC) 10 MG tablet Take 1 tablet (10 mg  total) by mouth once daily.    butalbital-acetaminophen-caffeine -40 mg (FIORICET, ESGIC) -40 mg per tablet Take 1 tablet by mouth every 6 (six) hours as needed for Pain.    calcium-vitamin D3 (OS-KATY 500 + D3) 500 mg(1,250mg) -200 unit per tablet Take 1 tablet by mouth 2 (two) times daily with meals.    dapsone 100 MG Tab Take 1 tablet (100 mg total) by mouth once daily.    diphenhydrAMINE (BENADRYL) 50 MG tablet Take 1 tablet (50 mg total) by mouth every 6 (six) hours as needed for Itching.    fexofenadine (ALLEGRA) 180 MG tablet Take 180 mg by mouth once daily.    fluconazole (DIFLUCAN) 150 MG Tab TAKE 1 TABLET BY MOUTH EVERY WEEK    fluticasone propionate (FLONASE) 50 mcg/actuation nasal spray INSERT 2 SPRAYS IN EACH NOSTRIL DAILY    folic acid (FOLVITE) 1 MG tablet Take 1 tablet (1,000 mcg total) by mouth once daily.    gabapentin (NEURONTIN) 300 MG capsule Take 1 capsule (300 mg total) by mouth 3 (three) times daily.    inhalation spacing device (PROCHAMBER) USE AS DIRECTED    levalbuterol (XOPENEX HFA) 45 mcg/actuation inhaler Inhale 1-2 puffs into the lungs.    levalbuterol (XOPENEX) 1.25 mg/3 mL nebulizer solution Take 3 mLs (1.25 mg total) by nebulization every 8 (eight) hours as needed for Wheezing or Shortness of Breath. Rescue    lipase-protease-amylase (CREON) 36,000-114,000- 180,000 unit CpDR Take 4 capsules by mouth 3 (three) times daily with meals. Take 3 with snacks as needed. (Patient taking differently: Take 5 capsules by mouth 3 (three) times daily with meals. )    LORazepam (ATIVAN) 2 MG Tab Take 1 tablet by mouth every 8 hours as needed for ANXIETY    magnesium oxide (MAG-OX) 400 mg (241.3 mg magnesium) tablet Take 1 tablet (400 mg total) by mouth 2 (two) times daily.    mometasone-formoterol (DULERA) 100-5 mcg/actuation HFAA Inhale 1 puff into the lungs 2 (two) times daily. Controller    montelukast (SINGULAIR) 10 mg tablet Take 1 tablet (10 mg total) by mouth  once daily.    morphine (MS CONTIN) 15 MG 12 hr tablet Take 1 tablet by mouth twice a day    multivit,min52-folic-vitK-cQ10 (AQUADEKS) 100-700-10 mcg-mcg-mg Cap cap 1 tab twice daily    multivitamin-minerals no.55 (CENTRUM FLAVOR BURST ADULT) Chew     mycophenolate (CELLCEPT) 250 mg Cap Take 2 capsules (500 mg total) by mouth 2 (two) times daily.    omeprazole (PRILOSEC) 40 MG capsule Take 40 mg by mouth every morning.    ondansetron (ZOFRAN) 8 MG tablet Take 1 tablet (8 mg total) by mouth every 12 (twelve) hours as needed for Nausea.    oxycodone (ROXICODONE) 5 MG immediate release tablet Take 10 mg by mouth every 4 (four) hours as needed for Pain.    oxyCODONE (ROXICODONE) 5 MG immediate release tablet take 1 tablet by mouth twice a day    polyethylene glycol (GLYCOLAX) 17 gram PwPk Take 17 g by mouth 2 (two) times daily.    predniSONE (DELTASONE) 5 MG tablet Take 1 tablet (5 mg total) by mouth once daily.    promethazine (PHENERGAN) 25 MG tablet Take 1 tablet (25 mg total) by mouth every 6 (six) hours as needed for Nausea.    QUEtiapine (SEROQUEL) 25 MG Tab Take 1 tablet by mouth in morning and 2 tablets at bedtime    sodium chloride 3% 3 % nebulizer solution Take 4 mLs by nebulization once daily.    tacrolimus (PROGRAF) 0.5 MG Cap Take 1 capsule (0.5 mg total) by mouth every 12 (twelve) hours.    tacrolimus (PROGRAF) 1 MG Cap Take 2 capsules (2 mg total) by mouth every 12 (twelve) hours. Daily doses: 2.5 mg twice daily.    tiZANidine (ZANAFLEX) 4 MG tablet TAKE 2 TABLETS BY MOUTH EVERY 6 HOURS AS NEEDED    tobramycin with nebulizer 300 mg/5 mL Nebu Take 300 mg by nebulization every 12 (twelve) hours.    topiramate (TOPAMAX) 25 MG tablet Take 1 tablet (25 mg total) by mouth 2 (two) times daily    venlafaxine (EFFEXOR-XR) 150 MG Cp24 Take 1 capsule (150 mg total) by mouth once daily.    venlafaxine (EFFEXOR-XR) 37.5 MG 24 hr capsule Take 1 capsule (37.5 mg total) by mouth once daily.      Antibiotics (From admission, onward)    Start     Stop Route Frequency Ordered    07/09/19 1330  ceftolozane-tazobactam (ZERBAXA) 1,500 mg in dextrose 5 % 100 mL      -- IV Every 8 hours (non-standard times) 07/09/19 1219    07/05/19 1800  dapsone tablet 100 mg      -- Oral Daily 07/05/19 1305        Antifungals (From admission, onward)    Start     Stop Route Frequency Ordered    07/10/19 1745  voriconazole (VFEND) 230 mg in dextrose 5 % 100 mL IVPB      -- IV Every 12 hours (non-standard times) 07/09/19 1634    07/09/19 1745  voriconazole (VFEND) 350 mg in dextrose 5 % 100 mL IVPB      07/10 1744 IV Every 12 hours (non-standard times) 07/09/19 1634    07/06/19 1215  micafungin 100 mg in sodium chloride 0.9 % 100 mL IVPB (ready to mix system)      -- IV Every 24 hours (non-standard times) 07/06/19 1109        Antivirals (From admission, onward)        Stop Route Frequency     valGANciclovir      -- Oral 2 times daily           Immunization History   Administered Date(s) Administered    Cytomegalovirus Immune Globulin 06/13/2014    DTP 08/23/1994, 08/16/1995    Hepatitis A, Adult 04/04/2018, 01/22/2019    Hepatitis B 08/10/2000, 11/20/2000    Hepatitis B, Pediatric/Adolescent 11/28/2001    Influenza 09/12/2018    Influenza - Quadrivalent - PF 10/04/2017, 09/06/2018    Influenza - Trivalent (ADULT) 11/28/2001, 01/22/2002, 12/10/2002, 11/26/2003, 10/19/2004, 11/15/2005, 11/11/2006, 10/15/2007, 10/28/2009, 11/03/2010, 10/19/2011    Influenza - Trivalent - PF (ADULT) 10/14/2008, 10/22/2012, 11/02/2016    Influenza A (H1N1) 2009 Monovalent - IM - PF 12/01/2009    MMR 08/23/1994, 08/16/1995    Meningococcal Conjugate (MCV4P) 10/24/2008    OPV 08/23/1994, 08/16/1995, 08/10/2000    Pneumococcal Conjugate - 13 Valent 10/02/2017    Pneumococcal Conjugate - 7 Valent 01/22/2002    Pneumococcal Polysaccharide - 23 Valent 01/22/2002    Td (ADULT) 08/10/2000, 12/16/2010    Tdap 01/22/2019    Typhoid - ViCPs  04/06/2018       Family History     Problem Relation (Age of Onset)    Cancer Maternal Grandmother    Diabetes Maternal Grandfather    Drug abuse Maternal Grandfather    Hypertension Maternal Grandmother    Thrombocytopenia Maternal Grandfather        Social History     Socioeconomic History    Marital status: Single     Spouse name: Not on file    Number of children: Not on file    Years of education: Not on file    Highest education level: Not on file   Occupational History    Not on file   Social Needs    Financial resource strain: Not on file    Food insecurity:     Worry: Not on file     Inability: Not on file    Transportation needs:     Medical: Not on file     Non-medical: Not on file   Tobacco Use    Smoking status: Never Smoker    Smokeless tobacco: Never Used   Substance and Sexual Activity    Alcohol use: No     Comment: Has not had an alcoholic drink in more than 2 months.    Drug use: No    Sexual activity: Yes     Partners: Male     Birth control/protection: See Surgical Hx     Comment: current partner since Oct 2016   Lifestyle    Physical activity:     Days per week: Not on file     Minutes per session: Not on file    Stress: Not on file   Relationships    Social connections:     Talks on phone: Not on file     Gets together: Not on file     Attends [employer] service: Not on file     Active member of club or organization: Not on file     Attends meetings of clubs or organizations: Not on file     Relationship status: Not on file   Other Topics Concern    Not on file   Social History Narrative    Not on file     Review of Systems   Constitutional: Positive for fatigue. Negative for appetite change, chills, diaphoresis and fever.   HENT: Negative for congestion, postnasal drip, rhinorrhea, sinus pressure and sinus pain.    Respiratory: Positive for cough and shortness of breath. Negative for wheezing.    Cardiovascular: Positive for chest pain (incision site). Negative for  palpitations and leg swelling.   Gastrointestinal: Negative for abdominal distention, abdominal pain, diarrhea, nausea and vomiting.   Genitourinary: Negative for decreased urine volume, difficulty urinating, dysuria and urgency.   Musculoskeletal: Positive for back pain (chronic). Negative for neck pain and neck stiffness.   Skin: Positive for pallor. Negative for rash and wound.   Allergic/Immunologic: Positive for immunocompromised state.   Neurological: Positive for weakness. Negative for dizziness, syncope and headaches.   Psychiatric/Behavioral: Negative for confusion. The patient is nervous/anxious.      Objective:     Vital Signs (Most Recent):  Temp: 97.8 °F (36.6 °C) (07/09/19 1147)  Pulse: 68 (07/09/19 1517)  Resp: 16 (07/09/19 1516)  BP: (!) 129/92 (07/09/19 1516)  SpO2: (!) 92 % (07/09/19 1516) Vital Signs (24h Range):  Temp:  [97.8 °F (36.6 °C)-99.5 °F (37.5 °C)] 97.8 °F (36.6 °C)  Pulse:  [68-81] 68  Resp:  [14-20] 16  SpO2:  [90 %-93 %] 92 %  BP: (120-145)/() 129/92     Weight: 58.7 kg (129 lb 6.6 oz)  Body mass index is 24.45 kg/m².    Estimated Creatinine Clearance: 56.5 mL/min (based on SCr of 1.2 mg/dL).    Physical Exam   Constitutional: She appears well-developed and well-nourished. She is easily aroused. No distress.   HENT:   Head: Normocephalic and atraumatic.   Right Ear: External ear normal.   Left Ear: External ear normal.   Nose: Nose normal.   Eyes: Conjunctivae and EOM are normal. No scleral icterus.   Neck: Normal range of motion. Neck supple. No JVD present.   Cardiovascular: Normal rate, regular rhythm and normal heart sounds. Exam reveals no friction rub.   No murmur heard.  Pulmonary/Chest: Effort normal. No accessory muscle usage. No respiratory distress. She has decreased breath sounds in the right middle field, the right lower field and the left lower field. She has no wheezes. She has no rhonchi. She has no rales.   Abdominal: Soft. Bowel sounds are normal. She exhibits  no distension. There is no tenderness.   Musculoskeletal: Normal range of motion. She exhibits no edema.   Neurological: She is alert and easily aroused.   Medication-induced confusion.   Skin: Skin is warm and dry. She is not diaphoretic. No erythema.   Psychiatric: Her mood appears not anxious. Her speech is delayed and slurred. She is slowed. She is inattentive.   Nursing note and vitals reviewed.      Significant Labs:   CBC:   Recent Labs   Lab 07/08/19  0530 07/09/19  0530   WBC 5.19 5.04   HGB 8.6* 8.9*   HCT 28.2* 29.6*   * 130*     CMP:   Recent Labs   Lab 07/08/19  0530 07/09/19  0530    137   K 4.7 4.6    108   CO2 19* 21*   * 108   BUN 14 14   CREATININE 1.3 1.2   CALCIUM 8.0* 8.2*   PROT 5.2* 5.4*   ALBUMIN 2.4* 2.4*   BILITOT 0.3 0.3   ALKPHOS 128 122   AST 13 12   ALT 25 21   ANIONGAP 10 8   EGFRNONAA 55.2* >60.0       Significant Imaging: I have reviewed all pertinent imaging results/findings within the past 24 hours.

## 2019-07-10 NOTE — NURSING
Checked pt's bed sugar at bedtime before pt was going to eat a snack. 1st bg check was 490. Rechecked pt with a different machine and finger- blood sugar 290. I recalibrated the machine to make sure and the next two blood sugars were 275, 261. Covered pt with sliding scale and prn snack insulin. Will continue to monitor.

## 2019-07-10 NOTE — ASSESSMENT & PLAN NOTE
Transitioned to home regimen of MS Contin 15 mg BID. Oxy 5 mg Q4 and Oxy 10 mg Q4 PRN. Continue to closely monitor status. Limit sedating medications. Ensure patient is fully alert and awake prior to eating to avoid risk of aspiration.

## 2019-07-10 NOTE — CARE UPDATE
BG goal 140-180    Remains in TSU, appetite remains poor due to nausea  BG mildly elevated yesterday afternoon, received snack insulin plus correction  Prednisone 20 mg daily  On IV antibiotics    Continue:  Novolog 4-6 units with meals  Novolog 3 units PRN snack dose  Moderate dose correction scale  BG monitoring AC/HS    Endocrine to continue to follow    ** Please call Endocrine for any BG related issues **

## 2019-07-10 NOTE — SUBJECTIVE & OBJECTIVE
"Subjective:     Interval History: No acute events overnight. Patient notably more alert this morning and increasing ability to clear secretions. Complaints of a few episodes of "meche" like diarrhea over the past several days and decreased PO intake due to nausea. Pain regimen adjusted to home regimen of MS Contin 15 mg BID with oxy 5 and oxy 10 available as needed. Remains afebrile on Zerbaxa. Awaiting approval for rehab.    Patient performed IS this morning while on rounds and was only able to get to 750 cc once. O2 sats improved to 96% on RA following a few rounds of IS. Advised patient to become more adherent with IS use of approximately 10x per hour and encouraged deep breathing.      Continuous Infusions:    Scheduled Meds:   ceftolozane-tazobactam  1,500 mg Intravenous Q8H    cetirizine  10 mg Oral Daily    dapsone  100 mg Oral Daily    enoxaparin  40 mg Subcutaneous Daily    famotidine  20 mg Oral BID    gabapentin  300 mg Oral TID    insulin aspart U-100  4-6 Units Subcutaneous TIDWM    levalbuterol  1.25 mg Nebulization Q6H WAKE    lipase-protease-amylase 24,000-76,000-120,000 units  4 capsule Oral TID WM    metoprolol tartrate  25 mg Oral TID    micafungin (MYCAMINE) IVPB  100 mg Intravenous Q24H    morphine  15 mg Oral Q12H    mycophenolate  500 mg Oral BID    predniSONE  20 mg Oral Daily    QUEtiapine  50 mg Oral QHS    sodium chloride 7%  4 mL Nebulization Q12H    valGANciclovir  450 mg Oral BID    venlafaxine  150 mg Oral QHS    venlafaxine  37.5 mg Oral QHS    voriconazole (VFEND) IVPB (wt < 83 kg)  4 mg/kg Intravenous Q12H     PRN Meds:sodium chloride, acetaminophen, bisacodyl, Dextrose 10% Bolus, Dextrose 10% Bolus, diphenhydrAMINE, glucagon (human recombinant), glucose, glucose, insulin aspart U-100, insulin aspart U-100, lactulose, lipase-protease-amylase 24,000-76,000-120,000 units, LORazepam, magnesium sulfate IVPB, metoclopramide HCl, metoprolol, naloxone, ondansetron, " ondansetron, oxyCODONE, oxyCODONE, potassium chloride in water **AND** potassium chloride in water **AND** potassium chloride in water, promethazine (PHENERGAN) IVPB    Review of patient's allergies indicates:   Allergen Reactions    Albuterol Palpitations    Colistin Anaphylaxis    Vancomycin analogues      Infusion reaction that does not resolve with slowing  Pt. States she can tolerate it when given with 50 mg Benadryl and ran over 3 hours    Neupogen [filgrastim] Other (See Comments)     Ostealgia after five daily doses of 300 mcg.      Bactrim [sulfamethoxazole-trimethoprim] Hives    Ceftazidime Hives     Pt stated can tolerate cefapine not ceftazidime    Ceftazidime     Dronabinol Other (See Comments)     Mental changes/hallucinations    Haldol [haloperidol lactate] Other (See Comments)     Seizure like activity    Nsaids (non-steroidal anti-inflammatory drug)      Cannot have due to lung transplant    Adhesive Rash     Cloth tape- please use tegaderm or paper tape    Aztreonam Rash    Ciprofloxacin Nausea And Vomiting     Projectile N/V, per patient.  Unwilling to retry therapy.       Review of Systems   Constitutional: Positive for appetite change and fatigue. Negative for chills, diaphoresis and fever.   HENT: Negative for congestion, postnasal drip, rhinorrhea, sinus pressure and sinus pain.    Respiratory: Positive for cough and shortness of breath. Negative for wheezing.    Cardiovascular: Positive for chest pain (incision site). Negative for palpitations and leg swelling.   Gastrointestinal: Positive for diarrhea and nausea. Negative for abdominal distention, abdominal pain, constipation and vomiting.   Genitourinary: Negative for decreased urine volume, difficulty urinating, dysuria and urgency.   Musculoskeletal: Positive for back pain (chronic). Negative for neck pain and neck stiffness.   Skin: Positive for pallor. Negative for rash and wound.   Allergic/Immunologic: Positive for  immunocompromised state.   Neurological: Positive for weakness. Negative for dizziness, seizures, syncope and headaches.   Psychiatric/Behavioral: Negative for agitation, confusion and decreased concentration. The patient is nervous/anxious.      Objective:   Physical Exam   Constitutional: She is oriented to person, place, and time. She appears well-developed and well-nourished. She is easily aroused. No distress.   HENT:   Head: Normocephalic and atraumatic.   Right Ear: External ear normal.   Left Ear: External ear normal.   Nose: Nose normal.   Eyes: Conjunctivae and EOM are normal. No scleral icterus.   Neck: Normal range of motion. Neck supple. No JVD present.   Cardiovascular: Normal rate, regular rhythm and normal heart sounds. Exam reveals no friction rub.   No murmur heard.  Pulmonary/Chest: Effort normal. No accessory muscle usage. No respiratory distress. She has decreased breath sounds in the right lower field and the left lower field. She has no wheezes. She has no rhonchi. She has no rales.   Abdominal: Soft. Bowel sounds are normal. She exhibits no distension. There is no tenderness.   Musculoskeletal: Normal range of motion. She exhibits no edema.   Neurological: She is alert, oriented to person, place, and time and easily aroused.   Skin: Skin is warm and dry. She is not diaphoretic. No erythema.   Psychiatric: Her speech is normal. Her mood appears not anxious.   Nursing note and vitals reviewed.        Vital Signs (Most Recent):  Temp: 97.7 °F (36.5 °C) (07/10/19 1208)  Pulse: 75 (07/10/19 1208)  Resp: 16 (07/10/19 1208)  BP: (!) 131/94 (07/10/19 1208)  SpO2: (!) 94 % (07/10/19 1208) Vital Signs (24h Range):  Temp:  [97.5 °F (36.4 °C)-98.2 °F (36.8 °C)] 97.7 °F (36.5 °C)  Pulse:  [66-86] 75  Resp:  [16-19] 16  SpO2:  [90 %-99 %] 94 %  BP: (129-139)/(85-97) 131/94     Weight: 58.7 kg (129 lb 6.6 oz)  Body mass index is 24.45 kg/m².      Intake/Output Summary (Last 24 hours) at 7/10/2019 1408  Last  data filed at 7/10/2019 0922  Gross per 24 hour   Intake 480 ml   Output 1600 ml   Net -1120 ml       Ventilator Data:          Hemodynamic Parameters:       Lines/Drains:       Introducer right internal jugular (Active)   Specific Qualities Capped 6/30/2019  7:10 AM   Dressing Status Clean;Dry;Biopatch in place;Intact 6/30/2019  7:10 AM   Dressing Intervention Dressing reinforced 6/29/2019  3:00 AM   Dressing Change Due 06/29/19 6/30/2019  3:00 AM   Daily Line Review Performed 6/30/2019  7:10 AM   Number of days:             Port A Cath Single Lumen 06/24/14 1200 right subclavian (Active)   Accessed by: Radha Lanza 6/24/2019  5:00 AM   Dressing Type Transparent 6/30/2019  7:10 AM   Dressing Status Biopatch in place;Clean;Dry;Intact 6/30/2019  7:10 AM   Dressing Intervention Dressing reinforced 6/29/2019  3:00 AM   Dressing Change Due 06/30/19 6/30/2019  3:00 AM   Line Status Infusing 6/30/2019  7:10 AM   Flush Performed Yes 6/30/2019  7:10 AM   Date to be Reflushed 06/18/19 6/29/2019  3:00 AM   Daily Line Review Performed 6/30/2019  7:10 AM   Type of Needle Nicole 6/30/2019  3:00 AM   Gauge 20 6/30/2019  3:00 AM   Needle Length 1 in 6/30/2019  3:00 AM   Needle Status Left in place 6/30/2019  3:00 AM   Needle Insertion Date 06/24/19 6/30/2019  3:00 AM   Needle Insertion Time 0500 6/26/2019  3:00 AM   Number of days: 1831            Percutaneous Central Line Insertion/Assessment - double lumen  06/18/19 0510 (Active)   Dressing biopatch in place;dressing dry and intact 6/30/2019  7:10 AM   Securement secured w/ sutures 6/30/2019  7:10 AM   Additional Site Signs no erythema;no warmth 6/30/2019  7:10 AM   Distal Patency/Care infusing 6/30/2019  7:10 AM   Proximal Patency/Care infusing 6/30/2019  7:10 AM   Waveform normal 6/30/2019  7:10 AM   Line Interventions line leveled/zeroed 6/30/2019  7:10 AM   Dressing Change Due 06/29/19 6/30/2019  3:00 AM   Daily Line Review Performed 6/30/2019  7:10 AM   Number of days: 12             Peripheral IV - Single Lumen 06/27/19 0917 22 G Right Forearm (Active)   Site Assessment Clean;Dry;Intact;No redness;No swelling 6/30/2019  7:10 AM   Line Status Infusing 6/30/2019  7:10 AM   Dressing Status Clean;Dry;Intact 6/30/2019  7:10 AM   Dressing Intervention Dressing reinforced 6/29/2019  3:00 AM   Dressing Change Due 07/01/19 6/30/2019  3:00 AM   Site Change Due 07/01/19 6/29/2019  7:00 PM   Reason Not Rotated Not due 6/30/2019  7:10 AM   Number of days: 3            Chest Tube 06/29/19 1655 1 Right Fourth intercostal space;Midaxillary 7 Fr. (Active)   Chest Tube WDL WDL 6/30/2019  7:10 AM   Function -20 cm H2O 6/30/2019  7:10 AM   Air Leak/Fluctuation air leak present 6/30/2019  7:10 AM   Safety all tubing connections taped;2 rubber-tipped hemostats w/ patient;all connections secured;suction checked 6/30/2019  7:10 AM   Securement tubing secured to body distal to insertion site w/ tape 6/30/2019  7:10 AM   Patency Intervention Tip/tilt 6/30/2019  7:10 AM   Drainage Description Serosanguineous 6/30/2019  7:10 AM   Dressing Appearance occlusive gauze dressing intact 6/30/2019  7:10 AM   Dressing Care dressing changed 6/30/2019 10:00 AM   Left Subcutaneous Emphysema none present 6/30/2019  7:10 AM   Right Subcutaneous Emphysema none present 6/30/2019  7:10 AM   Site Assessment Clean;Dry;Intact;No redness;No swelling;Ecchymotic 6/30/2019 10:00 AM   Surrounding Skin Dry;Intact 6/30/2019  7:10 AM   Output (mL) 0 mL 6/30/2019 10:00 AM   Number of days: 0            Y Chest Tube 3 and 4 06/18/19 1151 3 Left Pleural 19 Fr. 4 Left Mediastinal 19 Fr. (Active)   Function -20 cm H2O 6/30/2019  7:10 AM   Air Leak/Fluctuation air leak not present 6/30/2019  7:10 AM   Safety all tubing connections taped;2 rubber-tipped hemostats w/ patient;all connections secured;suction checked 6/30/2019  7:10 AM   Securement tubing secured to body distal to insertion site w/ tape 6/30/2019  7:10 AM   Left Subcutaneous  Emphysema none present 6/30/2019  7:10 AM   Right Subcutaneous Emphysema none present 6/30/2019  7:10 AM   Patency Intervention Tip/tilt 6/30/2019  7:10 AM   Drainage Description 3 Serosanguineous 6/30/2019  7:10 AM   Tube 3 Dressing Appearance occlusive gauze dressing intact 6/30/2019  7:10 AM   Tube 3 Dressing Care dressing changed 6/30/2019 10:00 AM   Site Assessment 3 Clean;Dry;Intact 6/30/2019 10:00 AM   Surrounding Skin 3 Dry;Intact 6/30/2019  7:10 AM   Drainage Description 4 Serosanguineous 6/30/2019  7:10 AM   Tube 4 Dressing Appearance occlusive gauze dressing intact 6/30/2019  7:10 AM   Tube 4 Dressing Care dressing changed 6/30/2019 10:00 AM   Site Assessment 4 Clean;Dry;Intact 6/30/2019 10:00 AM   Surrounding Skin 4 Dry;Intact 6/30/2019  7:10 AM   Output (mL) 0 mL 6/30/2019 10:00 AM   Number of days: 11       Significant Labs:  CBC:  Recent Labs   Lab 07/10/19  0510   WBC 4.34   RBC 3.04*   HGB 8.6*   HCT 28.7*   *   MCV 94   MCH 28.3   MCHC 30.0*     BMP:  Recent Labs   Lab 07/10/19  0510      K 4.1      CO2 22*   BUN 14   CREATININE 1.1   CALCIUM 7.9*      Tacrolimus Levels:  Recent Labs   Lab 07/10/19  0510   TACROLIMUS 9.5     Microbiology:  Microbiology Results (last 7 days)     Procedure Component Value Units Date/Time    Fungus culture [[id number]] Collected:  07/05/19 1445    Order Status:  Completed Specimen:  Respiratory from Bronchial Wash Updated:  07/10/19 1055     Fungus (Mycology) Culture Culture in progress    Narrative:       Bronchial Wash    Fungus culture [[id number]] Collected:  06/25/19 0900    Order Status:  Completed Specimen:  Respiratory from Bronchial Wash Updated:  07/10/19 0918     Fungus (Mycology) Culture Culture in progress      No fungus isolated after 2 weeks    Narrative:       Bronchial Wash    AFB Culture & Smear [[id number]] Collected:  07/05/19 1445    Order Status:  Completed Specimen:  Respiratory from Bronchial Wash Updated:  07/08/19 1540     AFB  Culture & Smear Culture in progress     AFB CULTURE STAIN No acid fast bacilli seen.    Narrative:       Bronchial Wash    Culture, Respiratory [[id number]]  (Abnormal)  (Susceptibility) Collected:  07/05/19 1445    Order Status:  Completed Specimen:  Respiratory from Bronchial Wash Updated:  07/08/19 1100     Respiratory Culture No S aureus isolated.      PSEUDOMONAS AERUGINOSA   Moderate  Normal respiratory anibal also present       Gram Stain (Respiratory) Few WBC's     Gram Stain (Respiratory) Few Gram positive rods     Gram Stain (Respiratory) Few budding yeast    Narrative:       Bronchial Wash          I have reviewed all pertinent labs within the past 24 hours.    Diagnostic Results:  Chest X-Ray: Vascular catheters, right pleural drain and monitoring leads remain.  There is some high-density material in the upper abdomen likely from the modified barium swallow.  Heart is enlarged there is mild increase in the pulmonary vascular markings.  Persistent parenchymal opacification bilaterally.  No significant pneumothorax seen.  Probable right pleural effusion.

## 2019-07-10 NOTE — CONSULTS
[facility]  Physical Medicine & Rehab  Consult Note    Patient Name: Juanita Ibarra  MRN: [id number]  Admission Date: 6/17/2019  Hospital Length of Stay: 22 days  Attending Physician: Jake Alvarez MD     Inpatient consult to Physical Medicine & Rehabilitation  Consult performed by: Norma Bhatia NP  Consult requested by:  Jake Alvarez MD    Reason for Consult:  assess rehabilitation needs  Consults  Subjective:     Principal Problem: S/P lung transplant    HPI: Juanita Ibarra is a 29-year-old female with PMHx of pancreatic insufficieny, cystic fibrosis, ( s/p b/l lung transplant 6/12/14),  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  Patient presented to [facility] on 6/17 for b/l lung transplant that was performed successfully on 6/18/19. Hospital course further complicated by hypercapnic respiratory failure ( re-intubated 6/21. S/p Bronchoscopy 7/5 with  pseudomonas-on Zerbaxa and Cipro IV), acute blood loss anemia, anxiety, chronic pain, Candida glabrata infection (on Micafungin and voriconazole IV). Currently on RA with sats ~90% with some secretions present.     Functional History: Patient lives in [place] with  in a single story home with no steps to enter.  Prior to admission, (I) with ADLs and mobility. DME: WC and 02.     Hospital Course: 07/8/2019: OT-Bed mobility CGA-MaxA .  Sit to stand Alexandru & RW and transfers Min-modA.  Ambulated 3 steps Alexandru & RW with complaints of fatigue.  Toileting Alexandru and LBD SV.  07/9/2019:  PT-Sit to stand ModA.  Ambulated 3 steps + 3 steps + 4 steps ModA & RW. On each trial, pt required mod-max A to control descent into sitting in chair 2/2 sudden descent, which pt attributed to fatigue.      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    Cystic  fibrosis of the lung     Ear infection     Hypertension     Migraine headache     MRSA (methicillin resistant Staphylococcus aureus) carrier     Osteopenia     Other specified disease of pancreas     Pain disorder     Seizures 2013    Sinusitis, chronic     Vocal cord paralysis 06/2014    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     Past Surgical History:   Procedure Laterality Date    ABDOMINAL SURGERY      peg tube     FZNZGPOF-ITCKKTCEWEB-ORTCOOGYJGDR N/A 5/19/2015    Performed by [facility]y Dias [facility] OR    BIOPSY-BRONCHUS N/A 12/20/2016    Performed by Jake Alvarez MD at [facility] OR 2ND [facility]    Bronchoscopy N/A 7/5/2019    Performed by Francisca Morin DO at [facility] OR [facility]    BRONCHOSCOPY Bilateral 12/11/2018    Performed by Francisca Morin DO at [facility] OR [facility]    BRONCHOSCOPY N/A 10/1/2018    Performed by Jake Alvarez MD at [facility] OR [facility]    BRONCHOSCOPY Bilateral 12/20/2016    Performed by Jake [facility]varez MD at [facility] OR [facility]    BRONCHOSCOPY - flexible bronchoscopy with probable tissue biopsy CPT 89415 N/A 7/21/2015    Performed by Jake Alvarez MD at [facility] OR [facility]    BRONCHOSCOPY - flexible bronchoscopy with probable tissue biospy CPT 99925 N/A 10/20/2015    Performed by Jake Alvarez MD at [facility] OR [facility]    BRONCHOSCOPY - flexible bronchoscopy with tissue biopsy CPT 94367 N/A 9/29/2015    Performed by Jake Alvarez MD at [facility] OR [facility]    BRONCHOSCOPY - flexible bronchoscopy with tissue biopsy CPT 98737 N/A 5/26/2015    Performed by Jake Alvarez MD at [facility] OR [facility]    BRONCHOSCOPY flexible bronchoscopy with tissue biopsy N/A 9/8/2014    Performed by Jake Alvarez MD at [facility] OR [facility]    BRONCHOSCOPY flexible with possible tissue biopsy N/A 8/8/2014    Performed by Jake Alvarez MD at [facility] OR [facility]    BRONCHOSCOPY, BAL, BIOPSIES N/A 3/20/2018    Performed by Jake Alvarez MD at [facility] OR [facility]    BRONCHOSCOPY-FIBEROPTIC N/A  1/29/2016    Performed by Joann Cifuentes DO at [facility] OR 2ND FLR    BRONCHOSCOPY; Bronchoscopy with BAL and transbronchial biopsies under general anesthesia N/A 5/29/2018    [facility]    CHOLECYSTECTOMY  2016    CHOLECYSTECTOMY-LAPAROSCOPIC N/A 7/22/2016    Performed by Joshua Goldberg, MD at [facility] OR [facility]ND [facility]    COLONOSCOPY N/A 5/3/2019    Performed by Juancho Rich MD at [facility] ENDO (2ND FLR)    ENDOMETRIAL ABLATION  2015    KJB    ETHMOIDECTOMY Bilateral 4/9/2019    Performed by Adin Burks III, MD at [facility] OR [facility]ND [facility]    FESS      FESS Bilateral 10/21/2013    Performed by Jared Whatley MD at [facility] OR [address] [place] [address]    FESS, USING COMPUTER-ASSISTED NAVIGATION N/A 4/9/2019    Performed by Adin Burks III, MD at [facility] OR [address] [place] [address]    FINE NEEDLE ASPIRATION (FNA)  of a cervical lymphadenopathy  1/29/2016    Performed by Joann Cifuentes DO at [facility] OR [facility]    flex bronchoscopy with probable tissue biopsy N/A 7/31/2014    Performed by [facility] Diagnostic Provider at [facility] OR 2ND FLR    flexible bronchoscopy with tissue biopsy N/A 12/11/2014    [facility]    HYSTERECTOMY  04/2017    [facility]    INJECTION-VOCAL CORD Left 6/24/2014    Performed by Jared Whatley MD at [facility] OR [address] [place] [address]    JERYWFYJO-VCXN-X-CATH to right chest and removal of portacath to left chests wall. Bilateral 6/24/2014    Performed by Zhen Dorado MD at [facility] OR [facility]    LARYNX SURGERY  2016    LUNG TRANSPLANT Bilateral 6/12/14    MAXILLARY ANTROSTOMY  4/9/2019    Performed by [facility]in [facility]    MYRINGOTOMY W/ TUBES Right 04/2017    MYRINGOTOMY WITH INSERTION OF PE TUBES Right 4/15/2017    Performed by Gary Null MD at [facility]    PLACEMENT-TUBE-PEG  5/15/2014    Performed by David Moses [facility] at [facility]    PORTACATH PLACEMENT Right     rt sc    REDO BILATERAL LUNG TRANSPLANT;  [place]LL Bilateral 6/18/2019    Performed by Jonathan Newby MD at [facility] OR [facility]    REMOVAL-TUBE-PEG  5/15/2014    Performed by David Moses MD at [facility] OR [facility]    RHC for lung re-transplant N/A 1/22/2019    Performed by Laura Rachel MD at [facility] CATH LAB    ROBOTIC ASSISTED LAPAROSCOPIC HYSTERECTOMY N/A 4/25/2017    Performed by Deny Dias Jr., MD at [facility] OR    SALPINGECTOMY Bilateral 2015    KJB    SALPINGECTOMY-LAPAROSCOPIC Bilateral 5/19/2015    Performed by Deny Dias Jr., MD at [facility] OR    SINUS SURGERY FUNCTIONAL ENDOSCOPIC WITH NAVIGATION Bilateral 4/3/2017    [facility]    SINUS SURGERY FUNCTIONAL ENDOSCOPIC WITH NAVIGATION, 78741, 79933, 30771, 23871 Bilateral 2/5/2015    Performed by Cesar Olsen MD at [facility] OR [facility] FLR    SPHENOIDECTOMY Bilateral 4/9/2019    Performed by [facility]in [facility]    THYROPLASTY - Medialization laryngoplasty, cricothyroid subluxation, arytenoid repositioning Left 8/2/2016    Performed by Gary Null MD at [facility] OR [facility]ND [facility]    TRANSPLANT-LUNG Bilateral 6/11/2014    Performed by [facility]fo [facility]uston [facility] at [facility]     Review of patient's allergies indicates:   Allergen Reactions    Albuterol Palpitations    Colistin Anaphylaxis    Vancomycin analogues      Infusion reaction that does not resolve with slowing  Pt. States she can tolerate it when given with 50 mg Benadryl and ran over 3 hours    Neupogen [filgrastim] Other (See Comments)     Ostealgia after five daily doses of 300 mcg.      Bactrim [sulfamethoxazole-trimethoprim] Hives    Ceftazidime Hives     Pt stated can tolerate cefapine not ceftazidime    Ceftazidime     Dronabinol Other (See Comments)     Mental changes/hallucinations    Haldol [haloperidol lactate] Other (See Comments)     Seizure like activity    Nsaids (non-steroidal anti-inflammatory drug)      Cannot have due to lung transplant     Adhesive Rash     Cloth tape- please use tegaderm or paper tape    Aztreonam Rash    Ciprofloxacin Nausea And Vomiting     Projectile N/V, per patient.  Unwilling to retry therapy.       Scheduled Medications:    acetylcysteine 200 mg/ml (20%)  4 mL Nebulization Q12H    ceftolozane-tazobactam  1,500 mg Intravenous Q8H    cetirizine  10 mg Oral Daily    dapsone  100 mg Oral Daily    enoxaparin  40 mg Subcutaneous Daily    famotidine  20 mg Oral BID    gabapentin  300 mg Oral TID    insulin aspart U-100  4-6 Units Subcutaneous TIDWM    levalbuterol  1.25 mg Nebulization Q6H WAKE    lipase-protease-amylase 24,000-76,000-120,000 units  4 capsule Oral TID WM    metoprolol tartrate  25 mg Oral TID    micafungin (MYCAMINE) IVPB  100 mg Intravenous Q24H    morphine  15 mg Oral Q8H    mycophenolate  500 mg Oral BID    predniSONE  20 mg Oral Daily    QUEtiapine  50 mg Oral QHS    sodium chloride 7%  4 mL Nebulization Q12H    valGANciclovir  450 mg Oral BID    venlafaxine  150 mg Oral QHS    venlafaxine  37.5 mg Oral QHS    voriconazole (VFEND) IVPB (wt < 83 kg)  6 mg/kg Intravenous Q12H    Followed by    voriconazole (VFEND) IVPB (wt < 83 kg)  4 mg/kg Intravenous Q12H       PRN Medications: sodium chloride, acetaminophen, bisacodyl, Dextrose 10% Bolus, Dextrose 10% Bolus, diphenhydrAMINE, glucagon (human recombinant), glucose, glucose, insulin aspart U-100, insulin aspart U-100, lactulose, lipase-protease-amylase 24,000-76,000-120,000 units, LORazepam, magnesium sulfate IVPB, metoclopramide HCl, metoprolol, naloxone, ondansetron, ondansetron, oxyCODONE, potassium chloride in water **AND** potassium chloride in water **AND** potassium chloride in water, promethazine (PHENERGAN) IVPB    Family History     Problem Relation (Age of Onset)    Cancer Maternal Grandmother    Diabetes Maternal Grandfather    Drug abuse Maternal Grandfather    Hypertension Maternal Grandmother    Thrombocytopenia Maternal  Grandfather        Tobacco Use    Smoking status: Never Smoker    Smokeless tobacco: Never Used   Substance and Sexual Activity    Alcohol use: No     Comment: Has not had an alcoholic drink in more than 2 months.    Drug use: No    Sexual activity: Yes     Partners: Male     Birth control/protection: See Surgical Hx     Comment: current partner since Oct 2016     Review of Systems   Constitutional: Positive for activity change and fatigue. Negative for chills.   HENT: Negative for trouble swallowing and voice change.    Eyes: Negative for photophobia and visual disturbance.   Respiratory: Positive for cough and shortness of breath.         Secretions   Cardiovascular: Positive for chest pain. Negative for palpitations.   Gastrointestinal: Positive for nausea. Negative for vomiting.   Genitourinary: Negative for difficulty urinating and flank pain.   Musculoskeletal: Positive for arthralgias, back pain, gait problem and myalgias.   Skin: Negative for color change and rash.   Allergic/Immunologic: Positive for immunocompromised state.   Neurological: Positive for weakness. Negative for speech difficulty.   Psychiatric/Behavioral: Negative for confusion. The patient is nervous/anxious.      Objective:     Vital Signs (Most Recent):  Temp: 98.2 °F (36.8 °C) (07/10/19 0848)  Pulse: 86 (07/10/19 0848)  Resp: 18 (07/10/19 0848)  BP: (!) 138/97 (07/10/19 0848)  SpO2: (!) 93 % (07/10/19 0848)    Vital Signs (24h Range):  Temp:  [97.5 °F (36.4 °C)-98.2 °F (36.8 °C)] 98.2 °F (36.8 °C)  Pulse:  [66-86] 86  Resp:  [16-19] 18  SpO2:  [90 %-99 %] 93 %  BP: (120-139)/(85-97) 138/97     Body mass index is 24.45 kg/m².    Physical Exam   Constitutional: She appears well-developed and well-nourished. She appears lethargic. She is easily aroused.   HENT:   Head: Normocephalic and atraumatic.   Eyes: Right eye exhibits no discharge. Left eye exhibits no discharge.   Neck: Neck supple.   Cardiovascular: Normal rate and intact  distal pulses.   Pulmonary/Chest: Effort normal. No respiratory distress.   On RA   Abdominal: Soft. There is no tenderness.   Musculoskeletal: She exhibits no edema or deformity.   Generalized deconditioning    Neurological: She is easily aroused. She appears lethargic.   Easily arousable, follows commands but falls back asleep for exam  Skin: Skin is warm and dry.   Psychiatric: She has a normal mood and affect. Her behavior is normal.   Vitals reviewed.      Diagnostic Results:   Labs: Reviewed  ECG: Reviewed  X-Ray: Reviewed    Assessment/Plan:     Impaired functional mobility and endurance  See hospital course for functional, cognitive/speech/language, and nutrition/swallow status.      Recommendations  -  Encourage mobility, OOB in chair at least 3 hours per day, and early ambulation as appropriate   -  PT/OT evaluate and treat  -  SLP speech and cognitive evaluate and treat  -  Monitor sleep disturbances and establish consistent sleep-wake cycle  -  Monitor for bowel and bladder dysfunction  -  Monitor for shoulder pain, subluxation, & spasticity  -  Monitor for and prevent skin breakdown and pressure ulcers  · Early mobility, repositioning/weight shifting every 20-30 minutes when sitting, turn patient every 2 hours, proper mattress/overlay and chair cushioning, pressure relief/heel protector boots  -  DVT prophylaxis (if appropriate)  -  Reviewed discharge options (IP rehab, SNF, HH therapy, and OP therapy)    Candida glabrata infection  -on Micafungin IV     Lung transplant status, bilateral  -s/p b/l lung transplant 6/18/19 2/2 progressive end stage CARLOS EDUARDO  -complicated by hypercapnic resp failure, now extubated  -Bronchoscopy 7/5 with  pseudomonas  -currently on RA with sat ~90%    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 Buckner pending medical stability (lethargy and pain management).      Thank you for your consult.     Norma Bhatia NP  Department of Physical Medicine & Rehab  [facility]

## 2019-07-10 NOTE — ASSESSMENT & PLAN NOTE
POD#22 s/p bilateral lung transplant (re-transplant) for CARLOS EDUARDO. 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 PGD 0. S/p bronch and extubated to [facility]NC on POD#2, but was re-intubated 6/21 for hypercapnic respiratory failure. Bronchoscopy 7/5 with  pseudomonas.     Continue PT/OT. Continue aggressive CPT/nebs. Mucomyst nebs d/c today. Discussed at length with patient to increase compliance with IS use. Continue immunosuppression and prophylaxis.

## 2019-07-10 NOTE — PROGRESS NOTES
"[facility]  Lung Transplant  Progress Note - Floor    Patient Name: Juanita Ibarra  MRN: [id number]  Admission Date: 6/17/2019  Hospital Length of Stay: 22 days  Post-Operative Day: 1854 (Lung), 22 (Lung)  Attending Physician: Jake Alvarez MD  Primary Care Provider: Primary Doctor No     Subjective:     Interval History: No acute events overnight. Patient notably more alert this morning and increasing ability to clear secretions. Complaints of a few episodes of "meche" like diarrhea over the past several days and decreased PO intake due to nausea. Pain regimen adjusted to home regimen of MS Contin 15 mg BID with oxy 5 and oxy 10 available as needed. Remains afebrile on Zerbaxa. Awaiting approval for rehab.    Patient performed IS this morning while on rounds and was only able to get to 750 cc once. O2 sats improved to 96% on RA following a few rounds of IS. Advised patient to become more adherent with IS use of approximately 10x per hour and encouraged deep breathing.      Continuous Infusions:    Scheduled Meds:   ceftolozane-tazobactam  1,500 mg Intravenous Q8H    cetirizine  10 mg Oral Daily    dapsone  100 mg Oral Daily    enoxaparin  40 mg Subcutaneous Daily    famotidine  20 mg Oral BID    gabapentin  300 mg Oral TID    insulin aspart U-100  4-6 Units Subcutaneous TIDWM    levalbuterol  1.25 mg Nebulization Q6H WAKE    lipase-protease-amylase 24,000-76,000-120,000 units  4 capsule Oral TID WM    metoprolol tartrate  25 mg Oral TID    micafungin (MYCAMINE) IVPB  100 mg Intravenous Q24H    morphine  15 mg Oral Q12H    mycophenolate  500 mg Oral BID    predniSONE  20 mg Oral Daily    QUEtiapine  50 mg Oral QHS    sodium chloride 7%  4 mL Nebulization Q12H    valGANciclovir  450 mg Oral BID    venlafaxine  150 mg Oral QHS    venlafaxine  37.5 mg Oral QHS    voriconazole (VFEND) IVPB (wt < 83 kg)  4 mg/kg Intravenous Q12H     PRN Meds:sodium chloride, " acetaminophen, bisacodyl, Dextrose 10% Bolus, Dextrose 10% Bolus, diphenhydrAMINE, glucagon (human recombinant), glucose, glucose, insulin aspart U-100, insulin aspart U-100, lactulose, lipase-protease-amylase 24,000-76,000-120,000 units, LORazepam, magnesium sulfate IVPB, metoclopramide HCl, metoprolol, naloxone, ondansetron, ondansetron, oxyCODONE, oxyCODONE, potassium chloride in water **AND** potassium chloride in water **AND** potassium chloride in water, promethazine (PHENERGAN) IVPB    Review of patient's allergies indicates:   Allergen Reactions    Albuterol Palpitations    Colistin Anaphylaxis    Vancomycin analogues      Infusion reaction that does not resolve with slowing  Pt. States she can tolerate it when given with 50 mg Benadryl and ran over 3 hours    Neupogen [filgrastim] Other (See Comments)     Ostealgia after five daily doses of 300 mcg.      Bactrim [sulfamethoxazole-trimethoprim] Hives    Ceftazidime Hives     Pt stated can tolerate cefapine not ceftazidime    Ceftazidime     Dronabinol Other (See Comments)     Mental changes/hallucinations    Haldol [haloperidol lactate] Other (See Comments)     Seizure like activity    Nsaids (non-steroidal anti-inflammatory drug)      Cannot have due to lung transplant    Adhesive Rash     Cloth tape- please use tegaderm or paper tape    Aztreonam Rash    Ciprofloxacin Nausea And Vomiting     Projectile N/V, per patient.  Unwilling to retry therapy.       Review of Systems   Constitutional: Positive for appetite change and fatigue. Negative for chills, diaphoresis and fever.   HENT: Negative for congestion, postnasal drip, rhinorrhea, sinus pressure and sinus pain.    Respiratory: Positive for cough and shortness of breath. Negative for wheezing.    Cardiovascular: Positive for chest pain (incision site). Negative for palpitations and leg swelling.   Gastrointestinal: Positive for diarrhea and nausea. Negative for abdominal distention,  abdominal pain, constipation and vomiting.   Genitourinary: Negative for decreased urine volume, difficulty urinating, dysuria and urgency.   Musculoskeletal: Positive for back pain (chronic). Negative for neck pain and neck stiffness.   Skin: Positive for pallor. Negative for rash and wound.   Allergic/Immunologic: Positive for immunocompromised state.   Neurological: Positive for weakness. Negative for dizziness, seizures, syncope and headaches.   Psychiatric/Behavioral: Negative for agitation, confusion and decreased concentration. The patient is nervous/anxious.      Objective:   Physical Exam   Constitutional: She is oriented to person, place, and time. She appears well-developed and well-nourished. She is easily aroused. No distress.   HENT:   Head: Normocephalic and atraumatic.   Right Ear: External ear normal.   Left Ear: External ear normal.   Nose: Nose normal.   Eyes: Conjunctivae and EOM are normal. No scleral icterus.   Neck: Normal range of motion. Neck supple. No JVD present.   Cardiovascular: Normal rate, regular rhythm and normal heart sounds. Exam reveals no friction rub.   No murmur heard.  Pulmonary/Chest: Effort normal. No accessory muscle usage. No respiratory distress. She has decreased breath sounds in the right lower field and the left lower field. She has no wheezes. She has no rhonchi. She has no rales.   Abdominal: Soft. Bowel sounds are normal. She exhibits no distension. There is no tenderness.   Musculoskeletal: Normal range of motion. She exhibits no edema.   Neurological: She is alert, oriented to person, place, and time and easily aroused.   Skin: Skin is warm and dry. She is not diaphoretic. No erythema.   Psychiatric: Her speech is normal. Her mood appears not anxious.   Nursing note and vitals reviewed.        Vital Signs (Most Recent):  Temp: 97.7 °F (36.5 °C) (07/10/19 1208)  Pulse: 75 (07/10/19 1208)  Resp: 16 (07/10/19 1208)  BP: (!) 131/94 (07/10/19 1208)  SpO2: (!) 94 %  (07/10/19 1208) Vital Signs (24h Range):  Temp:  [97.5 °F (36.4 °C)-98.2 °F (36.8 °C)] 97.7 °F (36.5 °C)  Pulse:  [66-86] 75  Resp:  [16-19] 16  SpO2:  [90 %-99 %] 94 %  BP: (129-139)/(85-97) 131/94     Weight: 58.7 kg (129 lb 6.6 oz)  Body mass index is 24.45 kg/m².      Intake/Output Summary (Last 24 hours) at 7/10/2019 1408  Last data filed at 7/10/2019 0922  Gross per 24 hour   Intake 480 ml   Output 1600 ml   Net -1120 ml       Ventilator Data:          Hemodynamic Parameters:       Lines/Drains:       Introducer right internal jugular (Active)   Specific Qualities Capped 6/30/2019  7:10 AM   Dressing Status Clean;Dry;Biopatch in place;Intact 6/30/2019  7:10 AM   Dressing Intervention Dressing reinforced 6/29/2019  3:00 AM   Dressing Change Due 06/29/19 6/30/2019  3:00 AM   Daily Line Review Performed 6/30/2019  7:10 AM   Number of days:             Port A Cath Single Lumen 06/24/14 1200 right subclavian (Active)   Accessed by: Radha Lanza 6/24/2019  5:00 AM   Dressing Type Transparent 6/30/2019  7:10 AM   Dressing Status Biopatch in place;Clean;Dry;Intact 6/30/2019  7:10 AM   Dressing Intervention Dressing reinforced 6/29/2019  3:00 AM   Dressing Change Due 06/30/19 6/30/2019  3:00 AM   Line Status Infusing 6/30/2019  7:10 AM   Flush Performed Yes 6/30/2019  7:10 AM   Date to be Reflushed 06/18/19 6/29/2019  3:00 AM   Daily Line Review Performed 6/30/2019  7:10 AM   Type of Needle Nicole 6/30/2019  3:00 AM   Gauge 20 6/30/2019  3:00 AM   Needle Length 1 in 6/30/2019  3:00 AM   Needle Status Left in place 6/30/2019  3:00 AM   Needle Insertion Date 06/24/19 6/30/2019  3:00 AM   Needle Insertion Time 0500 6/26/2019  3:00 AM   Number of days: 1831            Percutaneous Central Line Insertion/Assessment - double lumen  06/18/19 0510 (Active)   Dressing biopatch in place;dressing dry and intact 6/30/2019  7:10 AM   Securement secured w/ sutures 6/30/2019  7:10 AM   Additional Site Signs no erythema;no warmth  6/30/2019  7:10 AM   Distal Patency/Care infusing 6/30/2019  7:10 AM   Proximal Patency/Care infusing 6/30/2019  7:10 AM   Waveform normal 6/30/2019  7:10 AM   Line Interventions line leveled/zeroed 6/30/2019  7:10 AM   Dressing Change Due 06/29/19 6/30/2019  3:00 AM   Daily Line Review Performed 6/30/2019  7:10 AM   Number of days: 12            Peripheral IV - Single Lumen 06/27/19 0917 22 G Right Forearm (Active)   Site Assessment Clean;Dry;Intact;No redness;No swelling 6/30/2019  7:10 AM   Line Status Infusing 6/30/2019  7:10 AM   Dressing Status Clean;Dry;Intact 6/30/2019  7:10 AM   Dressing Intervention Dressing reinforced 6/29/2019  3:00 AM   Dressing Change Due 07/01/19 6/30/2019  3:00 AM   Site Change Due 07/01/19 6/29/2019  7:00 PM   Reason Not Rotated Not due 6/30/2019  7:10 AM   Number of days: 3            Chest Tube 06/29/19 1655 1 Right Fourth intercostal space;Midaxillary 7 Fr. (Active)   Chest Tube WDL WDL 6/30/2019  7:10 AM   Function -20 cm H2O 6/30/2019  7:10 AM   Air Leak/Fluctuation air leak present 6/30/2019  7:10 AM   Safety all tubing connections taped;2 rubber-tipped hemostats w/ patient;all connections secured;suction checked 6/30/2019  7:10 AM   Securement tubing secured to body distal to insertion site w/ tape 6/30/2019  7:10 AM   Patency Intervention Tip/tilt 6/30/2019  7:10 AM   Drainage Description Serosanguineous 6/30/2019  7:10 AM   Dressing Appearance occlusive gauze dressing intact 6/30/2019  7:10 AM   Dressing Care dressing changed 6/30/2019 10:00 AM   Left Subcutaneous Emphysema none present 6/30/2019  7:10 AM   Right Subcutaneous Emphysema none present 6/30/2019  7:10 AM   Site Assessment Clean;Dry;Intact;No redness;No swelling;Ecchymotic 6/30/2019 10:00 AM   Surrounding Skin Dry;Intact 6/30/2019  7:10 AM   Output (mL) 0 mL 6/30/2019 10:00 AM   Number of days: 0            Y Chest Tube 3 and 4 06/18/19 1151 3 Left Pleural 19 Fr. 4 Left Mediastinal 19 Fr. (Active)   Function -20  cm H2O 6/30/2019  7:10 AM   Air Leak/Fluctuation air leak not present 6/30/2019  7:10 AM   Safety all tubing connections taped;2 rubber-tipped hemostats w/ patient;all connections secured;suction checked 6/30/2019  7:10 AM   Securement tubing secured to body distal to insertion site w/ tape 6/30/2019  7:10 AM   Left Subcutaneous Emphysema none present 6/30/2019  7:10 AM   Right Subcutaneous Emphysema none present 6/30/2019  7:10 AM   Patency Intervention Tip/tilt 6/30/2019  7:10 AM   Drainage Description 3 Serosanguineous 6/30/2019  7:10 AM   Tube 3 Dressing Appearance occlusive gauze dressing intact 6/30/2019  7:10 AM   Tube 3 Dressing Care dressing changed 6/30/2019 10:00 AM   Site Assessment 3 Clean;Dry;Intact 6/30/2019 10:00 AM   Surrounding Skin 3 Dry;Intact 6/30/2019  7:10 AM   Drainage Description 4 Serosanguineous 6/30/2019  7:10 AM   Tube 4 Dressing Appearance occlusive gauze dressing intact 6/30/2019  7:10 AM   Tube 4 Dressing Care dressing changed 6/30/2019 10:00 AM   Site Assessment 4 Clean;Dry;Intact 6/30/2019 10:00 AM   Surrounding Skin 4 Dry;Intact 6/30/2019  7:10 AM   Output (mL) 0 mL 6/30/2019 10:00 AM   Number of days: 11       Significant Labs:  CBC:  Recent Labs   Lab 07/10/19  0510   WBC 4.34   RBC 3.04*   HGB 8.6*   HCT 28.7*   *   MCV 94   MCH 28.3   MCHC 30.0*     BMP:  Recent Labs   Lab 07/10/19  0510      K 4.1      CO2 22*   BUN 14   CREATININE 1.1   CALCIUM 7.9*      Tacrolimus Levels:  Recent Labs   Lab 07/10/19  0510   TACROLIMUS 9.5     Microbiology:  Microbiology Results (last 7 days)     Procedure Component Value Units Date/Time    Fungus culture [[id number]] Collected:  07/05/19 1445    Order Status:  Completed Specimen:  Respiratory from Bronchial Wash Updated:  07/10/19 1055     Fungus (Mycology) Culture Culture in progress    Narrative:       Bronchial Wash    Fungus culture [[id number]] Collected:  06/25/19 0900    Order Status:  Completed Specimen:  Respiratory  from Bronchial Wash Updated:  07/10/19 0918     Fungus (Mycology) Culture Culture in progress      No fungus isolated after 2 weeks    Narrative:       Bronchial Wash    AFB Culture & Smear [[id number]] Collected:  07/05/19 1445    Order Status:  Completed Specimen:  Respiratory from Bronchial Wash Updated:  07/08/19 1540     AFB Culture & Smear Culture in progress     AFB CULTURE STAIN No acid fast bacilli seen.    Narrative:       Bronchial Wash    Culture, Respiratory [[id number]]  (Abnormal)  (Susceptibility) Collected:  07/05/19 1445    Order Status:  Completed Specimen:  Respiratory from Bronchial Wash Updated:  07/08/19 1100     Respiratory Culture No S aureus isolated.      PSEUDOMONAS AERUGINOSA   Moderate  Normal respiratory anibal also present       Gram Stain (Respiratory) Few WBC's     Gram Stain (Respiratory) Few Gram positive rods     Gram Stain (Respiratory) Few budding yeast    Narrative:       Bronchial Wash          I have reviewed all pertinent labs within the past 24 hours.    Diagnostic Results:  Chest X-Ray: Vascular catheters, right pleural drain and monitoring leads remain.  There is some high-density material in the upper abdomen likely from the modified barium swallow.  Heart is enlarged there is mild increase in the pulmonary vascular markings.  Persistent parenchymal opacification bilaterally.  No significant pneumothorax seen.  Probable right pleural effusion.           Assessment/Plan:     * S/P lung transplant  POD#22 s/p bilateral lung transplant (re-transplant) for CARLOS EDUARDO. 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 PGD 0. S/p bronch and extubated to HFNC on POD#2, but was re-intubated 6/21 for hypercapnic respiratory failure. Bronchoscopy 7/5 with  pseudomonas.     Continue PT/OT. Continue aggressive CPT/nebs. Mucomyst nebs d/c today. Discussed at length with patient to increase compliance with IS use. Continue immunosuppression and prophylaxis.    Immunosuppression  Previously on  tacrolimus, MMF, and daily prednisone. S/p solumedrol in OR, basiliximab on POD#0 and POD#4. Continue MMF and steroid taper. Will continue to hold tacrolimus for now while awaiting for recent voriconazole loading to take effect. Monitor daily tacrolimus levels and adjust dose as needed.       Prophylactic antibiotic  CMV D-/R+. Received Vanc/Merrem as routine surgical prophylaxis, which was transitioned to Cipro. Continue OIP with valganciclovir and dapsone. Continue micafungin for Candida glabrata. Continue voriconazole for aspergillus.    Acute blood loss anemia  Received 6u PRBCs, 2u plts, 1 cryo, and 4 FFP angie-operatively. Will continue to monitor and be conservative with future transfusions.     Adrenal cortical steroids causing adverse effect in therapeutic use  Endocrinology consulted, appreciate recs.    CF related Pancreatic insufficiency  Continue Creon with meals and snacks.     Chronic pain with opiate use  Transitioned to home regimen of MS Contin 15 mg BID. Oxy 5 mg Q4 and Oxy 10 mg Q4 PRN. Continue to closely monitor status. Limit sedating medications. Ensure patient is fully alert and awake prior to eating to avoid risk of aspiration.     Steroid-induced hyperglycemia  Endocrine following, appreciate recs.     Aspergillus  Positive aspergillus ag from BAL on 7/5. Voriconazole loading administered. Continue voriconazole.    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 Received Tobramycin x1 on 6/22 and was transitioned from Merrem to Cipro. BAL from 7/5 with  pseudomonas. Started on Zerbaxa 7/9 per ID recs.     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 Repeat BAL on 6/23 with Candida glabrata. Discussed with ID, Dr. Francis. Continue micafungin.    Anxiety disorder  Continue home dose of Effexor and Ativan.        Jacqueline SHEN  DYLON Dumont  Lung Transplant  [facility]

## 2019-07-10 NOTE — ASSESSMENT & PLAN NOTE
-sees a pain management specialist   -primary continuing with scheduled morphine Q8hrs and Oxy 5 mg IR Q6hrs PRN

## 2019-07-10 NOTE — HPI
Juanita Ibarra is a 29-year-old female with PMHx of pancreatic insufficieny, cystic fibrosis, ( s/p b/l lung transplant 6/12/14),  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  Patient [place] [facility] [place]17 for b/l lung transplant that was performed successfully on 6/18/19. Hospital course further complicated by hypercapnic respiratory failure ( re-intubated 6/21. S/p Bronchoscopy 7/5 with  pseudomonas-on Zerbaxa and Cipro IV), acute blood loss anemia, anxiety, chronic pain, Candida glabrata infection (on Micafungin and voriconazole IV). Currently on RA with sats ~90% with some secretions present.     Functional History: Patient lives in [place] with  in a single story home with no steps to enter.  Prior to admission, (I) with ADLs and mobility. DME: [facility] and 02.

## 2019-07-10 NOTE — PT/OT/SLP PROGRESS
"Physical Therapy Treatment    Patient Name:  Juanita Ibarar   MRN:  [id number]    Recommendations:     Discharge Recommendations:  rehabilitation facility   Discharge Equipment Recommendations: (rolling walker, bedside commode)   Barriers to discharge: increased level of assistance required    Assessment:     Juanita Ibarra is a 29 y.o. female s/p lung transplant. Pt requires increased encouragement to progress therapeutic activities/exercises, and increased time for rest breaks due to fatigue and pain. Ms. Ibarra was unable to perform gait training this visit due to reports of increased pain and weakness in standing, with poor tolerance for remaining in upright standing. However, pt states that she remains motivated to progress with therapy in order to regain independence.  She presents with the following impairments/functional limitations:  weakness, impaired endurance, impaired functional mobilty, gait instability, impaired balance, impaired self care skills, pain, decreased lower extremity function, impaired cardiopulmonary response to activity. Pt would benefit from continued PT intervention to address listed functional deficits, reduce fall risk and maximize return to PLOF.     Rehab Prognosis: Good; patient would benefit from acute skilled PT services to address these deficits and reach maximum level of function.      Recent Surgery: Procedure(s) (LRB):  Bronchoscopy (N/A) 5 Days Post-Op    Plan:     During this hospitalization, patient to be seen 5 x/week to address the identified rehab impairments via gait training, therapeutic activities, therapeutic exercises, neuromuscular re-education and progress toward the following goals:    · Plan of Care Expires:  07/17/19    Subjective     Chief Complaint: pain   Patient/Family Comments/goals: "All they are doing is taking away my pain meds"   Pain/Comfort:  · Pain Rating 1: (Pt did not rate pain on numeric scale)  · Location - Side 1: " Bilateral  · Location 1: abdomen  · Pain Addressed 1: Distraction, Reposition, Nurse notified, Pre-medicate for activity, Cessation of Activity  · Pain Rating Post-Intervention 1: (remained throughout; reports of increased pain with standing)      Objective:     Communicated with RN prior to session.  Patient found sitting up in chair with telemetry, central line upon PT entry to room.     General Precautions: Standard, fall, neutropenic, contact, aspiration   Orthopedic Precautions:N/A   Braces: N/A     Functional Mobility:    Bed Mobility:   · N/T 2/2 pt sitting up in chair upon PT arrival     Transfers:   · Sit <> Stand Transfer: minimum assistance from bedside chair x 1st trial with RW; moderate assistance x 3 additional trials from bedside chair with RW   · Bed <> Chair: moderate assistance with RW AD                  Gait: Pt unable to perform when prompted to initiate forward stepping in standing due to pain and weakness. Pt with reports of increased pain and performing sudden descent into sitting. Increased posterior lean in static standing, requiring mod A to perform weight shifting to improve neutral alignment.      Therapeutic Activities and Exercises:  Therex for BLE strengthening/endurance: ankle pumps, LAQ, marches x 10 reps bilaterally. Unable to tolerate additional sets of therex 2/2 reports of fatigue. Rest breaks provided as needed.     Therapeutic activities aimed to increase pt's independence, safety, and efficiency with functional transfers and progress mobilization. See above for assistance levels. Unable to perform gait this visit.     Pt appears drowsy during session, but when prompted pt stating that she is only closing her eyes due to pain. Requires frequent reinforcement to maintain eyes open.     Patient left up in chair with all lines intact, call button in reach and RN and significant other present.    AM-PAC 6 CLICK MOBILITY  Turning over in bed (including adjusting bedclothes, sheets  and blankets)?: 3  Sitting down on and standing up from a chair with arms (e.g., wheelchair, bedside commode, etc.): 2  Moving from lying on back to sitting on the side of the bed?: 2  Moving to and from a bed to a chair (including a wheelchair)?: 2  Need to walk in hospital room?: 1  Climbing 3-5 steps with a railing?: 1  Basic Mobility Total Score: 11       GOALS:   Multidisciplinary Problems     Physical Therapy Goals        Problem: Physical Therapy Goal    Goal Priority Disciplines Outcome Goal Variances Interventions   Physical Therapy Goal     PT, PT/OT Ongoing (interventions implemented as appropriate)     Description:  Goals to be met by: 19    Patient will increase functional independence with mobility by performin. Supine <> sit with stand-by assistance.  2. Sit to stand transfer from bedside chair with contact guard assistance  3. Pt to perform stand pivot transfer with Tonio, with use of RW.  4. Gait  x 50 feet with Tonio, with use of RW.  5.  Pt to perform and be compliant with exercise program in sitting to improve BLE strength and endurance                    Time Tracking:     PT Received On: 07/10/19  PT Start Time: 1017     PT Stop Time: 1050  PT Total Time (min): 33 min     Billable Minutes: Therapeutic Activity 23 min and Therapeutic Exercise 10 min    Treatment Type: Treatment  PT/PTA: PT     PTA Visit Number: 0     Kari Suarez PT, DPT   7/10/2019  Pager: [phone]

## 2019-07-11 PROBLEM — T38.0X5A STEROID-INDUCED HYPERGLYCEMIA: Status: RESOLVED | Noted: 2018-01-01 | Resolved: 2019-01-01

## 2019-07-11 PROBLEM — R73.9 STEROID-INDUCED HYPERGLYCEMIA: Status: RESOLVED | Noted: 2018-01-01 | Resolved: 2019-01-01

## 2019-07-11 NOTE — PT/OT/SLP PROGRESS
Occupational Therapy   Treatment    Name: Juanita Ibarra  MRN: [id number]  Admitting Diagnosis:  S/P lung transplant  6 Days Post-Op    Recommendations:     Discharge Recommendations: rehabilitation facility  Discharge Equipment Recommendations:  commode, walker, rolling  Barriers to discharge:  None    Assessment:     Juanita Ibarra is a 29 y.o. female with a medical diagnosis of S/P lung transplant.  Pt presented to OT lethargic resulting in increased time provided for all functional activities. Pt progressing with functional mobility noted in her ability to ambulate 5 steps toward commode with stand pivot completed to descend onto commode with SW. Pt continues to present with overall weakness affecting her progress in therapy. Pt with one LOB corrected by therapist due to BLE knee buckling EOB during stand pivot transfer. Standing ADLs initiated at sink; however, pt refused to complete any tasks in standing due to fatigue. Goals re-assessed post session to endure all needs are being met. Pt and spouse verbalized understanding. Pt will continue to benefit from skilled OT to build strength and endurance for ADLs/IADLs.  Performance deficits affecting function are weakness, impaired endurance, impaired self care skills, impaired functional mobilty, gait instability, decreased lower extremity function, decreased safety awareness, impaired balance.     Rehab Prognosis:  Good; patient would benefit from acute skilled OT services to address these deficits and reach maximum level of function.       Plan:     Patient to be seen 3 x/week to address the above listed problems via self-care/home management, therapeutic activities, therapeutic exercises, neuromuscular re-education  · Plan of Care Expires: 08/06/19  · Plan of Care Reviewed with: patient, spouse    Subjective     Pain/Comfort:  · Pain Rating 1: 0/10(C/o nausea)  · Pain Rating Post-Intervention 1: 0/10    Objective:     Communicated with: RN  prior to session.  Patient found up in chair with telemetry, central line upon OT entry to room.    General Precautions: Standard, fall, neutropenic, contact, aspiration   Orthopedic Precautions:N/A   Braces: N/A     Occupational Performance:     Bed Mobility:    · Patient completed Rolling/Turning to Left with  stand by assistance  · Patient completed Rolling/Turning to Right with stand by assistance  · Patient completed Scooting/Bridging with maximal assistance  · Patient completed Sit to Supine with minimum assistance for BLE guidance into bed      Functional Mobility/Transfers:  · Patient completed Sit <> Stand Transfer from chair with minimum assistance  with  standard walker; CGA on 2nd trial from chair   · Patient completed Toilet <> Wheelchair Transfer using Stand Pivot technique with contact guard assistance with standard walker to begin ADLs at sink with W/C posteriorly placed for RBs if needed  · Patient completed Toilet Transfer Step Transfer technique with minimal assistance with  standard walker and grab bars due to decreased force production to ascend from toilet in the restroom  · Functional Mobility: Pt ambulated 5 steps toward toilet with CGA and standard walker. No LOB noted. No RBs required. Fatigue noted     Activities of Daily Living:  · Grooming: set up assistance for hand hygiene seated in w/c at sink   · Toileting: maximal assistance provided by spouse due to pt's request      [facility] 6 Click ADL: 21    Treatment & Education:  - Role of OT/ OT POC  - Self care safety/ independence  - Functional transfer/ mobility safety  - Bed mobility safety  - Pursed lip breathing  - Importance of sitting UIC  - Safety during transfers due to inability to reach back upon descent onto all surfaces   - Cueing provided for walker usage during mobility  - Goals re-assessed  - Importance of BUE movement to prevent stiffness/tone  - Importance of participation in therapy     Patient left HOB elevated with all  lines intact, call button in reach, RN notified and Spouse presentEducation:      GOALS:   Multidisciplinary Problems     Occupational Therapy Goals        Problem: Occupational Therapy Goal    Goal Priority Disciplines Outcome Interventions   Occupational Therapy Goal     OT, PT/OT Ongoing (interventions implemented as appropriate)    Description:  Goals to be met by: 7/4/19     Patient will increase functional independence with ADLs by performing:    UE Dressing with Supervision.  LE Dressing with Supervision. - Met on 7/8 with socks   Grooming while standing at sink with Supervision.  Toileting from toilet with Supervision for hygiene and clothing management. - Progressing   Toilet transfer to toilet with Supervision.- Progressing                        Time Tracking:     OT Date of Treatment: 07/11/19  OT Start Time: 1447  OT Stop Time: 1525  OT Total Time (min): 38 min    Billable Minutes:Self Care/Home Management 25  Therapeutic Activity 13    Ann Hood OT  7/11/2019

## 2019-07-11 NOTE — SUBJECTIVE & OBJECTIVE
Interval History: More oriented today. Afebrile. Tolerated zerbaxa well. Aspergillus antigen not surprisingly positive and started on vori.    Review of Systems   Constitutional: Positive for appetite change and fatigue. Negative for chills, diaphoresis and fever.   HENT: Negative for congestion, postnasal drip, rhinorrhea, sinus pressure and sinus pain.    Respiratory: Positive for cough and shortness of breath. Negative for wheezing.    Cardiovascular: Negative for chest pain (incision site), palpitations and leg swelling.   Gastrointestinal: Negative for abdominal distention, abdominal pain, constipation, diarrhea, nausea and vomiting.   Genitourinary: Negative for decreased urine volume, difficulty urinating, dysuria and urgency.   Musculoskeletal: Positive for back pain (chronic). Negative for neck pain and neck stiffness.   Skin: Positive for pallor. Negative for rash and wound.   Allergic/Immunologic: Positive for immunocompromised state.   Neurological: Positive for weakness. Negative for dizziness, seizures, syncope and headaches.   Psychiatric/Behavioral: Negative for agitation, confusion and decreased concentration. The patient is nervous/anxious.      Objective:     Vital Signs (Most Recent):  Temp: 98 °F (36.7 °C) (07/10/19 1632)  Pulse: 76 (07/10/19 1900)  Resp: 15 (07/10/19 1632)  BP: (!) 132/90 (07/10/19 1632)  SpO2: (!) 91 % (07/10/19 1632) Vital Signs (24h Range):  Temp:  [97.5 °F (36.4 °C)-98.2 °F (36.8 °C)] 98 °F (36.7 °C)  Pulse:  [66-86] 76  Resp:  [15-19] 15  SpO2:  [90 %-99 %] 91 %  BP: (130-139)/(85-97) 132/90     Weight: 58.7 kg (129 lb 6.6 oz)  Body mass index is 24.45 kg/m².    Estimated Creatinine Clearance: 61.6 mL/min (based on SCr of 1.1 mg/dL).    Physical Exam   Constitutional: She is oriented to person, place, and time. She appears well-developed and well-nourished. She is easily aroused. No distress.   HENT:   Head: Normocephalic and atraumatic.   Right Ear: External ear normal.    Left Ear: External ear normal.   Nose: Nose normal.   Eyes: Conjunctivae and EOM are normal. No scleral icterus.   Neck: Normal range of motion. Neck supple. No JVD present.   Cardiovascular: Normal rate, regular rhythm and normal heart sounds. Exam reveals no friction rub.   No murmur heard.  Pulmonary/Chest: Effort normal. No accessory muscle usage. No respiratory distress. She has decreased breath sounds in the right lower field and the left lower field. She has no wheezes. She has no rhonchi. She has no rales.   Abdominal: Soft. Bowel sounds are normal. She exhibits no distension. There is no tenderness.   Musculoskeletal: Normal range of motion. She exhibits no edema.   Neurological: She is alert, oriented to person, place, and time and easily aroused.   Skin: Skin is warm and dry. She is not diaphoretic. No erythema.   Psychiatric: Her speech is normal. Her mood appears not anxious.   Nursing note and vitals reviewed.      Significant Labs:   CBC:   Recent Labs   Lab 07/09/19  0530 07/10/19  0510   WBC 5.04 4.34   HGB 8.9* 8.6*   HCT 29.6* 28.7*   * 120*     CMP:   Recent Labs   Lab 07/09/19  0530 07/10/19  0510    139   K 4.6 4.1    109   CO2 21* 22*    112*   BUN 14 14   CREATININE 1.2 1.1   CALCIUM 8.2* 7.9*   PROT 5.4* 5.1*   ALBUMIN 2.4* 2.4*   BILITOT 0.3 0.3   ALKPHOS 122 118   AST 12 14   ALT 21 19   ANIONGAP 8 8   EGFRNONAA >60.0 >60.0       Significant Imaging: I have reviewed all pertinent imaging results/findings within the past 24 hours.

## 2019-07-11 NOTE — PROGRESS NOTES
[facility]  Lung Transplant  Progress Note - Floor    Patient Name: Juanita Ibarra  MRN: [id number]  Admission Date: 6/17/2019  Hospital Length of Stay: 23 days  Post-Operative Day: 1855 (Lung), 23 (Lung)  Attending Physician: Jake Alvarez MD  Primary Care Provider: Primary Doctor No     Subjective:     Interval History: No acute events overnight. Remains on RA with sats 90-94%. Tmax 100.1 overnight. Will place referral to LTACH. More lethargic on exam today than yesterday. Oxy 10 mg decreased to Q6hrs. Plan for Zyrtec prior to AM dose of Zerbaxa and then will use Benadryl prior to next two doses. Continued discussions with patient regarding her need to improve her physical deconditioning and to continue compliance with IS use.  Psych consulted, appreciate recs.      Continuous Infusions:    Scheduled Meds:   ceftolozane-tazobactam  1,500 mg Intravenous Q8H    cetirizine  10 mg Oral Daily    dapsone  100 mg Oral Daily    enoxaparin  40 mg Subcutaneous Daily    famotidine  20 mg Oral BID    gabapentin  300 mg Oral TID    insulin aspart U-100  4-6 Units Subcutaneous TIDWM    levalbuterol  1.25 mg Nebulization Q6H WAKE    lipase-protease-amylase 24,000-76,000-120,000 units  4 capsule Oral TID WM    metoprolol tartrate  25 mg Oral TID    micafungin (MYCAMINE) IVPB  100 mg Intravenous Q24H    morphine  15 mg Oral Q12H    mycophenolate  500 mg Oral BID    predniSONE  20 mg Oral Daily    QUEtiapine  50 mg Oral QHS    sodium chloride 7%  4 mL Nebulization Q12H    tacrolimus  0.5 mg Oral BID    valGANciclovir  450 mg Oral BID    venlafaxine  150 mg Oral QHS    venlafaxine  37.5 mg Oral QHS    voriconazole (VFEND) IVPB (wt < 83 kg)  4 mg/kg Intravenous Q12H     PRN Meds:sodium chloride, acetaminophen, bisacodyl, Dextrose 10% Bolus, Dextrose 10% Bolus, diphenhydrAMINE, glucagon (human recombinant), glucose, glucose, insulin aspart U-100, insulin aspart U-100, lactulose,  levalbuterol, lipase-protease-amylase 24,000-76,000-120,000 units, LORazepam, magnesium sulfate IVPB, metoclopramide HCl, metoprolol, naloxone, ondansetron, ondansetron, oxyCODONE, potassium chloride in water **AND** potassium chloride in water **AND** potassium chloride in water, promethazine (PHENERGAN) IVPB    Review of patient's allergies indicates:   Allergen Reactions    Albuterol Palpitations    Colistin Anaphylaxis    Vancomycin analogues      Infusion reaction that does not resolve with slowing  Pt. States she can tolerate it when given with 50 mg Benadryl and ran over 3 hours    Neupogen [filgrastim] Other (See Comments)     Ostealgia after five daily doses of 300 mcg.      Bactrim [sulfamethoxazole-trimethoprim] Hives    Ceftazidime Hives     Pt stated can tolerate cefapine not ceftazidime    Ceftazidime     Dronabinol Other (See Comments)     Mental changes/hallucinations    Haldol [haloperidol lactate] Other (See Comments)     Seizure like activity    Nsaids (non-steroidal anti-inflammatory drug)      Cannot have due to lung transplant    Adhesive Rash     Cloth tape- please use tegaderm or paper tape    Aztreonam Rash    Ciprofloxacin Nausea And Vomiting     Projectile N/V, per patient.  Unwilling to retry therapy.       Review of Systems   Constitutional: Positive for appetite change and fatigue. Negative for chills, diaphoresis and fever.   HENT: Negative for congestion, postnasal drip, rhinorrhea, sinus pressure and sinus pain.    Respiratory: Positive for cough and shortness of breath. Negative for wheezing.    Cardiovascular: Positive for chest pain (incision site). Negative for palpitations and leg swelling.   Gastrointestinal: Positive for diarrhea and nausea. Negative for abdominal distention, abdominal pain, constipation and vomiting.   Genitourinary: Negative for decreased urine volume, difficulty urinating, dysuria and urgency.   Musculoskeletal: Positive for back pain  (chronic). Negative for neck pain and neck stiffness.   Skin: Positive for pallor. Negative for rash and wound.   Allergic/Immunologic: Positive for immunocompromised state.   Neurological: Positive for weakness. Negative for dizziness, seizures, syncope and headaches.   Psychiatric/Behavioral: Negative for agitation, confusion and decreased concentration. The patient is nervous/anxious.      Objective:   Physical Exam   Constitutional: She is oriented to person, place, and time. She appears well-developed and well-nourished. She is easily aroused. No distress.   HENT:   Head: Normocephalic and atraumatic.   Right Ear: External ear normal.   Left Ear: External ear normal.   Nose: Nose normal.   Eyes: Conjunctivae and EOM are normal. No scleral icterus.   Neck: Normal range of motion. Neck supple. No JVD present.   Cardiovascular: Normal rate, regular rhythm and normal heart sounds. Exam reveals no friction rub.   No murmur heard.  Pulmonary/Chest: Effort normal. No accessory muscle usage. No respiratory distress. She has decreased breath sounds in the right lower field and the left lower field. She has wheezes. She has no rhonchi. She has no rales.   Abdominal: Soft. Bowel sounds are normal. She exhibits no distension. There is no tenderness.   Musculoskeletal: Normal range of motion. She exhibits no edema.   Neurological: She is alert, oriented to person, place, and time and easily aroused.   Intermittent episodes of lethargy   Skin: Skin is warm and dry. She is not diaphoretic. No erythema.   Psychiatric: Judgment normal. Her mood appears not anxious. Her speech is delayed. She is slowed. She exhibits abnormal recent memory.   Nursing note and vitals reviewed.        Vital Signs (Most Recent):  Temp: 98.8 °F (37.1 °C) (07/11/19 1137)  Pulse: 93 (07/11/19 1137)  Resp: 17 (07/11/19 1137)  BP: (!) 134/91 (07/11/19 1137)  SpO2: (!) 92 % (07/11/19 1137) Vital Signs (24h Range):  Temp:  [97.7 °F (36.5 °C)-100.1 °F (37.8  °C)] 98.8 °F (37.1 °C)  Pulse:  [70-93] 93  Resp:  [15-18] 17  SpO2:  [90 %-96 %] 92 %  BP: (131-160)/() 134/91     Weight: 59.4 kg (131 lb)  Body mass index is 24.75 kg/m².      Intake/Output Summary (Last 24 hours) at 7/11/2019 1202  Last data filed at 7/11/2019 0430  Gross per 24 hour   Intake 1460 ml   Output 1800 ml   Net -340 ml       Ventilator Data:          Hemodynamic Parameters:       Lines/Drains:       Introducer right internal jugular (Active)   Specific Qualities Capped 6/30/2019  7:10 AM   Dressing Status Clean;Dry;Biopatch in place;Intact 6/30/2019  7:10 AM   Dressing Intervention Dressing reinforced 6/29/2019  3:00 AM   Dressing Change Due 06/29/19 6/30/2019  3:00 AM   Daily Line Review Performed 6/30/2019  7:10 AM   Number of days:             Port A Cath Single Lumen 06/24/14 1200 right subclavian (Active)   Accessed by: Radha Lanza 6/24/2019  5:00 AM   Dressing Type Transparent 6/30/2019  7:10 AM   Dressing Status Biopatch in place;Clean;Dry;Intact 6/30/2019  7:10 AM   Dressing Intervention Dressing reinforced 6/29/2019  3:00 AM   Dressing Change Due 06/30/19 6/30/2019  3:00 AM   Line Status Infusing 6/30/2019  7:10 AM   Flush Performed Yes 6/30/2019  7:10 AM   Date to be Reflushed 06/18/19 6/29/2019  3:00 AM   Daily Line Review Performed 6/30/2019  7:10 AM   Type of Needle Nicole 6/30/2019  3:00 AM   Gauge 20 6/30/2019  3:00 AM   Needle Length 1 in 6/30/2019  3:00 AM   Needle Status Left in place 6/30/2019  3:00 AM   Needle Insertion Date 06/24/19 6/30/2019  3:00 AM   Needle Insertion Time 0500 6/26/2019  3:00 AM   Number of days: 1831            Percutaneous Central Line Insertion/Assessment - double lumen  06/18/19 0510 (Active)   Dressing biopatch in place;dressing dry and intact 6/30/2019  7:10 AM   Securement secured w/ sutures 6/30/2019  7:10 AM   Additional Site Signs no erythema;no warmth 6/30/2019  7:10 AM   Distal Patency/Care infusing 6/30/2019  7:10 AM   Proximal  Patency/Care infusing 6/30/2019  7:10 AM   Waveform normal 6/30/2019  7:10 AM   Line Interventions line leveled/zeroed 6/30/2019  7:10 AM   Dressing Change Due 06/29/19 6/30/2019  3:00 AM   Daily Line Review Performed 6/30/2019  7:10 AM   Number of days: 12            Peripheral IV - Single Lumen 06/27/19 0917 22 G Right Forearm (Active)   Site Assessment Clean;Dry;Intact;No redness;No swelling 6/30/2019  7:10 AM   Line Status Infusing 6/30/2019  7:10 AM   Dressing Status Clean;Dry;Intact 6/30/2019  7:10 AM   Dressing Intervention Dressing reinforced 6/29/2019  3:00 AM   Dressing Change Due 07/01/19 6/30/2019  3:00 AM   Site Change Due 07/01/19 6/29/2019  7:00 PM   Reason Not Rotated Not due 6/30/2019  7:10 AM   Number of days: 3            Chest Tube 06/29/19 1655 1 Right Fourth intercostal space;Midaxillary 7 Fr. (Active)   Chest Tube WDL WDL 6/30/2019  7:10 AM   Function -20 cm H2O 6/30/2019  7:10 AM   Air Leak/Fluctuation air leak present 6/30/2019  7:10 AM   Safety all tubing connections taped;2 rubber-tipped hemostats w/ patient;all connections secured;suction checked 6/30/2019  7:10 AM   Securement tubing secured to body distal to insertion site w/ tape 6/30/2019  7:10 AM   Patency Intervention Tip/tilt 6/30/2019  7:10 AM   Drainage Description Serosanguineous 6/30/2019  7:10 AM   Dressing Appearance occlusive gauze dressing intact 6/30/2019  7:10 AM   Dressing Care dressing changed 6/30/2019 10:00 AM   Left Subcutaneous Emphysema none present 6/30/2019  7:10 AM   Right Subcutaneous Emphysema none present 6/30/2019  7:10 AM   Site Assessment Clean;Dry;Intact;No redness;No swelling;Ecchymotic 6/30/2019 10:00 AM   Surrounding Skin Dry;Intact 6/30/2019  7:10 AM   Output (mL) 0 mL 6/30/2019 10:00 AM   Number of days: 0            Y Chest Tube 3 and 4 06/18/19 1151 3 Left Pleural 19 Fr. 4 Left Mediastinal 19 Fr. (Active)   Function -20 cm H2O 6/30/2019  7:10 AM   Air Leak/Fluctuation air leak not present 6/30/2019   7:10 AM   Safety all tubing connections taped;2 rubber-tipped hemostats w/ patient;all connections secured;suction checked 6/30/2019  7:10 AM   Securement tubing secured to body distal to insertion site w/ tape 6/30/2019  7:10 AM   Left Subcutaneous Emphysema none present 6/30/2019  7:10 AM   Right Subcutaneous Emphysema none present 6/30/2019  7:10 AM   Patency Intervention Tip/tilt 6/30/2019  7:10 AM   Drainage Description 3 Serosanguineous 6/30/2019  7:10 AM   Tube 3 Dressing Appearance occlusive gauze dressing intact 6/30/2019  7:10 AM   Tube 3 Dressing Care dressing changed 6/30/2019 10:00 AM   Site Assessment 3 Clean;Dry;Intact 6/30/2019 10:00 AM   Surrounding Skin 3 Dry;Intact 6/30/2019  7:10 AM   Drainage Description 4 Serosanguineous 6/30/2019  7:10 AM   Tube 4 Dressing Appearance occlusive gauze dressing intact 6/30/2019  7:10 AM   Tube 4 Dressing Care dressing changed 6/30/2019 10:00 AM   Site Assessment 4 Clean;Dry;Intact 6/30/2019 10:00 AM   Surrounding Skin 4 Dry;Intact 6/30/2019  7:10 AM   Output (mL) 0 mL 6/30/2019 10:00 AM   Number of days: 11       Significant Labs:  CBC:  Recent Labs   Lab 07/11/19  0500   WBC 5.97   RBC 3.38*   HGB 9.7*   HCT 32.0*   *   MCV 95   MCH 28.7   MCHC 30.3*     BMP:  Recent Labs   Lab 07/11/19  0500      K 4.2      CO2 24   BUN 13   CREATININE 1.0   CALCIUM 8.5*      Tacrolimus Levels:  Recent Labs   Lab 07/11/19  0500   TACROLIMUS 5.6     Microbiology:  Microbiology Results (last 7 days)     Procedure Component Value Units Date/Time    Fungus culture [[id number]]  (Abnormal) Collected:  07/05/19 1445    Order Status:  Completed Specimen:  Respiratory from Bronchial Wash Updated:  07/11/19 0852     Fungus (Mycology) Culture BRANDI GLABRATA  Many      Narrative:       Bronchial Wash    Fungus culture [[id number]] Collected:  06/25/19 0900    Order Status:  Completed Specimen:  Respiratory from Bronchial Wash Updated:  07/10/19 0918     Fungus  (Mycology) Culture Culture in progress      No fungus isolated after 2 weeks    Narrative:       Bronchial Wash    AFB Culture & Smear [[id number]] Collected:  07/05/19 1445    Order Status:  Completed Specimen:  Respiratory from Bronchial Wash Updated:  07/08/19 1540     AFB Culture & Smear Culture in progress     AFB CULTURE STAIN No acid fast bacilli seen.    Narrative:       Bronchial Wash    Culture, Respiratory [[id number]]  (Abnormal)  (Susceptibility) Collected:  07/05/19 1445    Order Status:  Completed Specimen:  Respiratory from Bronchial Wash Updated:  07/08/19 1100     Respiratory Culture No S aureus isolated.      PSEUDOMONAS AERUGINOSA   Moderate  Normal respiratory anibal also present       Gram Stain (Respiratory) Few WBC's     Gram Stain (Respiratory) Few Gram positive rods     Gram Stain (Respiratory) Few budding yeast    Narrative:       Bronchial Wash          I have reviewed all pertinent labs within the past 24 hours.    Diagnostic Results:  Chest X-Ray: Vascular catheters, right pleural drain and monitoring leads remain.  There is some high-density material in the upper abdomen likely from the modified barium swallow.  Heart is enlarged there is mild increase in the pulmonary vascular markings.  Persistent parenchymal opacification bilaterally.  No significant pneumothorax seen.  Probable right pleural effusion.           Assessment/Plan:     * S/P lung transplant  POD#22 s/p bilateral lung transplant (re-transplant) for CARLOS EDUARDO. 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 PGD 0. S/p bronch and extubated to HFNC on POD#2, but was re-intubated 6/21 for hypercapnic respiratory failure. Bronchoscopy 7/5 with  pseudomonas.     Continue PT/OT. Continue aggressive CPT/nebs. Discussed at length with patient to increase compliance with IS use. Continue immunosuppression and prophylaxis. Plan for LTACH referral.    Immunosuppression  Previously on tacrolimus, MMF, and daily prednisone. S/p solumedrol in  OR, basiliximab on POD#0 and POD#4. Continue MMF and steroid taper. Will resume tacrolimus 0.5 mg BID.  Monitor daily tacrolimus levels and adjust dose as needed.       Prophylactic antibiotic  CMV D-/R+. Received Vanc/Merrem as routine surgical prophylaxis, which was transitioned to Cipro. Continue OIP with valganciclovir and dapsone. Continue micafungin for Candida glabrata. Continue voriconazole for aspergillus.    Acute blood loss anemia  Received 6u PRBCs, 2u plts, 1 cryo, and 4 FFP angie-operatively. Will continue to monitor and be conservative with future transfusions.     Adrenal cortical steroids causing adverse effect in therapeutic use  Endocrinology consulted, appreciate recs.    CF related Pancreatic insufficiency  Continue Creon with meals and snacks.     Chronic pain with opiate use  Transitioned to home regimen of MS Contin 15 mg BID.  Oxy 10 mg Q6hrs PRN. Continue to closely monitor status. Limit sedating medications. Ensure patient is fully alert and awake prior to eating to avoid risk of aspiration.     Aspergillus  Positive aspergillus ag from BAL on 7/5. Continue voriconazole.    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 Received Tobramycin x1 on 6/22 and was transitioned from Merrem to Cipro. BAL from 7/5 with  Pseudomonas. Started on Zerbaxa 7/9.  Continue Zerbaxa per ID recs. Pre-med for Zerbaxa first AM dose with Zyrtec. Pre-med with Benadryl 25 mg prior to remaining two daily infusions.    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 Repeat BAL on 6/23 with Candida glabrata. Continue micafungin per ID recs.    Anxiety disorder  Continue home dose of Effexor and Ativan. Psych consulted for assistance, appreciate recs.        Jacqueline Dumont PA-C  Lung Transplant  [facility]

## 2019-07-11 NOTE — PROGRESS NOTES
"[facility]  Endocrinology  Progress Note    Admit Date: 6/17/2019     Reason for Consult: Management of  Hyperglycemia and CF related pancreatic insufficiency.     Surgical Procedure and Date: lung transplant in 2014; 6/18/19    HPI:   Patient is a 29 y.o. female with a diagnosis of CF, HTN, migraine headache, and osteopenia. 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 No personal history of DM. Endocrinology consulted for BG management.     Lab Results   Component Value Date    HGBA1C 4.7 01/09/2019                 Interval HPI:   Overnight events: Remains in TSU, NAEON. BG elevated yesterday evening.  Overall fairly well controlled with scheduled insulin plus correction scale. Prednisone 20 mg; steroids per primary.  Eating:   <25%  Nausea: No  Hypoglycemia and intervention: No  Fever: No  TPN and/or TF: No    BP (!) 147/90 (BP Location: Left arm, Patient Position: Lying)   Pulse 84   Temp 99.1 °F (37.3 °C) (Oral)   Resp 18   Ht 5' 1" (1.549 m)   Wt 59.4 kg (131 lb)   LMP 10/02/2016   SpO2 (!) 94%   Breastfeeding? No   BMI 24.75 kg/m²      Labs Reviewed and Include    Recent Labs   Lab 07/11/19  0500   GLU 87   CALCIUM 8.5*   ALBUMIN 2.7*   PROT 5.8*      K 4.2   CO2 24      BUN 13   CREATININE 1.0   ALKPHOS 153*   ALT 23   AST 20   BILITOT 0.4     Lab Results   Component Value Date    WBC 5.97 07/11/2019    HGB 9.7 (L) 07/11/2019    HCT 32.0 (L) 07/11/2019    MCV 95 07/11/2019     (L) 07/11/2019     No results for input(s): TSH, FREET4 in the last 168 hours.  Lab Results   Component Value Date    HGBA1C 4.7 01/09/2019       Nutritional status:   Body mass index is 24.75 kg/m².  Lab Results   Component Value Date    ALBUMIN 2.7 (L) 07/11/2019    ALBUMIN 2.4 (L) 07/10/2019    ALBUMIN 2.4 (L) 07/09/2019     Lab Results   Component Value Date    PREALBUMIN 15 (L) 05/29/2014    PREALBUMIN 11 (L) 05/09/2014    PREALBUMIN " 18 (L) 03/19/2014       Estimated Creatinine Clearance: 68 mL/min (based on SCr of 1 mg/dL).    Accu-Checks  Recent Labs     07/09/19  1151 07/09/19  1836 07/09/19  2058 07/09/19  2103 07/09/19  2105 07/09/19  2112 07/10/19  0858 07/10/19  1157 07/10/19  1814 07/10/19  1943   POCTGLUCOSE 130* 278* 490* 290* 275* 261* 96 137* 210* 204*       Current Medications and/or Treatments Impacting Glycemic Control  Immunotherapy:    Immunosuppressants         Stop Route Frequency     mycophenolate capsule 500 mg      -- Oral 2 times daily        Steroids:   Hormones (From admission, onward)    Start     Stop Route Frequency Ordered    07/02/19 0900  predniSONE tablet 20 mg      -- Oral Daily 07/01/19 1148    06/26/19 1827  dexAMETHasone sodium phos (PF) 10 mg/mL injection     Note to Pharmacy:  Created by cabinet override    06/27 0629   06/26/19 1827        Pressors:    Autonomic Drugs (From admission, onward)    None        Hyperglycemia/Diabetes Medications:   Antihyperglycemics (From admission, onward)    Start     Stop Route Frequency Ordered    07/06/19 1135  insulin aspart U-100 pen 3 Units      -- SubQ As needed (PRN) 07/06/19 1036    07/06/19 0815  insulin aspart U-100 pen 4-6 Units      -- SubQ 3 times daily with meals 07/06/19 0804    07/02/19 1543  insulin aspart U-100 pen 1-10 Units      -- SubQ Before meals & nightly PRN 07/02/19 1443          ASSESSMENT and PLAN    * S/P lung transplant  Managed per LUT.   avoid hypoglycemia        Hyperglycemia  BG goal 140 - 180    Continue Novolog 4-6 units with meals - monitor for prandial excursions once appetite improves  Continue Novolog 3 units snack dose for evenings.   Moderate dose correction scale  BG monitoring AC/HS    Discharge planning: TBD    CF related Pancreatic insufficiency  May impact BG.       Adrenal cortical steroids causing adverse effect in therapeutic use  Glucocorticoids markedly increase blood glucose. Expect the steroid taper will help glucose  control.         Prophylactic immunotherapy  May increase insulin resistance.           Deny Swenson NP  Endocrinology  [facility]

## 2019-07-11 NOTE — ASSESSMENT & PLAN NOTE
POD#22 s/p bilateral lung transplant (re-transplant) for CARLOS EDUARDO. 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 PGD 0. S/p bronch and extubated to HFNC on POD#2, but was re-intubated 6/21 for hypercapnic respiratory failure. Bronchoscopy 7/5 with  pseudomonas.     Continue PT/OT. Continue aggressive CPT/nebs. Discussed at length with patient to increase compliance with IS use. Continue immunosuppression and prophylaxis. Plan for LTACH referral.

## 2019-07-11 NOTE — ASSESSMENT & PLAN NOTE
28yo woman w/a history of CF (c/b pancreatic insufficiency, sinus disease, MRSA/Pseudomonas colonization c/b numerous antibiotics allergies, and subsequent COPD/ESLD; s/p BOLT 6/12/2014, CMV D+/R-, simulect induction, on maintenance tacro/MMF/pred; c/b multiple episodes of MRSA/Pseudomonas pneumonia/sinusitis, C.glabrata early post-operative colonization 7/2014, A1 rejection 8/2014 s/p pulse SM, prior CMV reactivations, and subsequent grade 3 CARLOS EDUARDO relisted for transplant) who was admitted on 6/17/2019 for redo bilateral lung transplant (s/p BOLT 6/18/2019, CMV D-/R+, basiliximab induction, on maintenance tacro/MMF/pred; c/b  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 ID was consulted for recent fevers (on 7/5), SOB, and non-productive cough due to ongoing  Pseudomonas pneumonia (with increasing MDR pattern) and invasive aspergillosis (with positive BAL antigen; multiple risk factors). She remains tenuous but is showing improvement on zerbaxa/vori.    - would continue zerbaxa (anti-histamine premedication given ceftaz allergy in past) for 2wks (stop date: 7/24/2019); isolate sensitive (censored in chart)  - would continue voriconazole for at least 6wks for invasive aspergillosis with 2wks of concurrent micafungin as combination therapy (will ensure adequate coverage of C.glabrata as well -- azole sensitivities pending on Candida isolate to facilitate cessation of micafungin); level to be ordered by ID pharmacist for voriconazole  - remainder of prophylaxis per protocol  - contact precautions for

## 2019-07-11 NOTE — HPI
"Juanita Ibarra is a 29 y.o. female with a past psychiatric history of anxiety who presented to [facility] due to S/P lung transplant. Psychiatry was consulted for medication management of anxiety.     Per Primary Team:  The patient has been hospitalized since 6/17/19 for her second lung transplant. She successfully underwent the procedure about three weeks ago and has been recovering, previously in the ICU and now in a step down unit. She had her first lung transplant in 2014. She has a history of cystic fibrosis. Long term history of anxiety and depression. Consult to psychiatry was for med management of anxiety.     Currently, the patient is on broad spectrum antibiotics, antivirals and antifungals, as well as immunosuppressants and opioids for pain control. According to RN, the patient has periods where she seems "back to her baseline" but also times when she seems very lethargic. Patient is on Ativan PRN for anxiety but has not been given to her due to concerns for sedation. According to Jacqueline who is mid-level taking care of the patient, the patient is allowed to have the Ativan PRN and it is not considered contraindicated by her physicians after the lung transplant.     Current psych meds: Ativan 2 mg q8h PRN, Seroquel 50 mg qhs (not getting due to excess sedation), Venlafaxine 187.5 mg/d.     Per Psychiatry:  The patient was seen at the bedside with common-law  present. They are not legally , but  is primary caretaker as well as MPOA.  answered much of the history because patient was excessively tired and mainly only responded to yes or no questions. Patient has been seeing Dr. Amador in outpatient psychiatry here since February, at which time they moved here from [place], [place].     Dr. Amador has tried patient on Seroquel as an outpatient which seemed to be effective in controlling anxiety, but here in the hospital she has not been getting the medication because it makes " her too tired. Has also tried the following for her symptoms: Hydroxyzine (sometimes too sedating, sometimes too activating), Venlafaxine (currently taking), Buspirone (too activating. Did not sleep well for four days), Klonopin (did not like it.. Took it earlier in her 20s.. Nonspecific as to why it was not preferred).     Main symptoms are insomnia and anxiety. Cannot describe what she is anxious about but just feels an overall uneasiness in her life, on a daily basis. Not related to having trouble breathing. Insomnia is described as wanting to sleep late and get up late. At home, her schedule is to sleep 2-3 am and wake up at 11 am. This is hard in the hospital. Stated that she has been doing this all her life. Wanted to try a med that would help her sleep and another regimen for anxiety. Ativan seems to help.     Denies manic symptoms and no psychotic symptoms. Denies history of suicidal/ homicidal thinking. No suicide attempts. No history of illicit drug use and no alcohol/tobacco use.     Collateral:   None obtained     Medical Review of Systems:  Pertinent items are noted in HPI.    Psychiatric Review of Systems-is patient experiencing or having changes in  sleep: yes  appetite: yes  weight: no  energy/anergy: yes  interest/pleasure/anhedonia: no  somatic symptoms: no  libido: no  anxiety/panic: yes  guilty/hopelessness: no  concentration: no  S.I.B.s/risky behavior: no  any drugs: no  alcohol: no     Allergies:  Albuterol; Colistin; Vancomycin analogues; Neupogen [filgrastim]; Bactrim [sulfamethoxazole-trimethoprim]; Ceftazidime; Ceftazidime; Dronabinol; Haldol [haloperidol lactate]; Nsaids (non-steroidal anti-inflammatory drug); Adhesive; Aztreonam; and Ciprofloxacin    Past Medical/Surgical History:  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    Cystic fibrosis of the lung     Ear infection     Hypertension     Migraine  headache     MRSA (methicillin resistant Staphylococcus aureus) carrier     Osteopenia     Other specified disease of pancreas     Pain disorder     Seizures 2013    Sinusitis, chronic     Vocal cord paralysis 06/2014    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     Past Surgical History:   Procedure Laterality Date    ABDOMINAL SURGERY      peg tube     RCOJBHAR-VFXPAFXNWAK-[id number] N/A 5/19/2015    Per[facility] OR    BIOPSY-BRONCHUS N/A 12/20/2016    Performed by Jake [facility]varez [facility] at [facility] OR 2ND FLR    Bronchoscopy N/A 7/5/2019    Performed by Francisca Morin DO at [facility] OR 2ND FLR    BRONCHOSCOPY Bilateral 12/11/2018    Performed by Francisca Morin DO at [facility] OR 2ND FLR    BRONCHOSCOPY N/A 10/1/2018    Performed by Jake [facility]varez MD at [facility] OR 2ND FLR    BRONCHOSCOPY Bilateral 12/20/2016    Performed by Jake [facility]varez MD at [facility] OR 2ND FLR    BRONCHOSCOPY - flexible bronchoscopy with probable tissue biopsy CPT 23559 N/A 7/21/2015    Performed by Jake [facility]varez MD at [facility] OR 2ND FLR    BRONCHOSCOPY - flexible bronchoscopy with probable tissue biospy CPT 01315 N/A 10/20/2015    Performed by Jake [facility]varez MD at [facility] OR 2ND FLR    BRONCHOSCOPY - flexible bronchoscopy with tissue biopsy CPT 76050 N/A 9/29/2015    Performed by Jake [facility]varez MD at [facility] OR 2ND FLR    BRONCHOSCOPY - flexible bronchoscopy with tissue biopsy CPT 09989 N/A 5/26/2015    Performed by Jake [facility]varez MD at [facility] OR 1ST FLR    BRONCHOSCOPY flexible bronchoscopy with tissue biopsy N/A 9/8/2014    Performed by Jake [facility]varez MD at [facility] OR 2ND FLR    BRONCHOSCOPY flexible with possible tissue biopsy N/A 8/8/2014    Performed by Jake [facility]varez MD at [facility] OR 2ND FLR    BRONCHOSCOPY, BAL, BIOPSIES N/A 3/20/2018    Performed by Jake [facility]varez MD at [facility] OR 2ND FLR    BRONCHOSCOPY-FIBEROPTIC N/A 1/29/2016    Performed by Joann Cifuentes DO at [facility] OR 2ND FLR     BRONCHOSCOPY; Bronchoscopy with BAL and transbronchial biopsies under general anesthesia N/A 5/29/2018    [facility]    CHOLECYSTECTOMY  2016    CHOLECYSTECTOMY-LAPAROSCOPIC N/A 7/22/2016    Performed by Joshua Goldberg, MD at [facility] OR 2ND FLR    COLONOSCOPY N/A 5/3/2019    Performed by Juancho Rich MD at [facility] ENDO (2ND FLR)    ENDOMETRIAL ABLATION  2015    [facility]    ETHMOIDECTOMY Bilateral 4/9/2019    Performed by Adin Burks III, MD at [facility] OR 2ND FLR    FESS      FESS Bilateral 10/21/2013    [facility]    FESS, USING COMPUTER-ASSISTED NAVIGATION N/A 4/9/2019    Performed by Adin Burks III, MD at [facility] OR [address] [place] [address]    FINE NEEDLE ASPIRATION (FNA)  of a cervical lymphadenopathy  1/29/2016    Performed by Joann Cifuentes DO at [facility] OR [address] [place] [address]    flex bronchoscopy with probable tissue biopsy N/A 7/31/2014    Performed by [facility] Provider at [facility] OR 2ND FLR    flexible bronchoscopy with tissue biopsy N/A 12/11/2014    [facility]    HYSTERECTOMY  04/2017    [facility]    INJECTION-VOCAL CORD Left 6/24/2014    Performed by Jared Whatley MD at [facility] OR [address] [place] [address]    [id number]-VSUM-Z-CATH to right chest and removal of portacath to left chests wall. Bilateral 6/24/2014    Performed [facility]ado [facility] at [facility]    LARYNX SURGERY  2016    LUNG TRANSPLANT Bilateral 6/12/14    MAXILLARY ANTROSTOMY  4/9/2019    Performed by Adin [facility]ks III, MD at [facility] OR [facility]    MYRINGOTOMY W/ TUBES Right 04/2017    MYRINGOTOMY WITH INSERTION OF PE TUBES Right 4/15/2017    Performed by Gary Null MD at [facility] OR [address] [place] [address]    PLACEMENT-TUBE-PEG  5/15/2014    [facility] Moses [facility] at [facility]    PORTACATH PLACEMENT Right     rt sc    REDO BILATERAL LUNG TRANSPLANT; CLAMSHELL Bilateral 6/18/2019    Performed by Jonathan Newby MD at [facility] OR  2ND FLR    REMOVAL-TUBE-PEG  5/15/2014    Performed by David Moses MD at [facility] OR 2ND FLR    RHC for lung re-transplant N/A 1/22/2019    Performed by Laura Rachel MD at [facility] CATH LAB    ROBOTIC ASSISTED LAPAROSCOPIC HYSTERECTOMY N/A 4/25/2017    Performed by [facility] Jr[facility] at [facility] OR    SALPINGECTOMY Bilateral 2015    [facility]    SALPINGECTOMY-LAPAROSCOPIC Bilateral 5/19/2015    Performed by [facility] Jr[facility] MD at [facility] OR    SINUS SURGERY FUNCTIONAL ENDOSCOPIC WITH NAVIGATION Bilateral 4/3/2017    Performed by Cesar Olsen MD at [facility] OR 2ND FLR    SINUS SURGERY FUNCTIONAL ENDOSCOPIC WITH NAVIGATION, 44806, 48266, 22454, 97967 Bilateral 2/5/2015    Performed by Cesar Olsen MD at [facility] OR 2ND FLR    SPHENOIDECTOMY Bilateral 4/9/2019    [facility]    THYROPLASTY - Medialization laryngoplasty, cricothyroid subluxation, arytenoid repositioning Left 8/2/2016    Performed by Gary Null MD at [facility] OR 2ND FLR    TRANSPLANT-LUNG Bilateral 6/11/2014    Performed by Adolfo Huston MD at [facility] OR 2ND FLR       Past Psychiatric History:  Previous Medication Trials: yes   Previous Psychiatric Hospitalizations: no   Previous Suicide Attempts: no   History of Violence: no  Outpatient Psychiatrist: yes    Social History:  Marital Status:   Children: 0   Employment Status/Info: never employed   Education: some college  Special Ed: no  Housing Status: lives with common law    History of phys/sexual abuse: unknown  Access to gun: no    Substance Abuse History:  Recreational Drugs: denies   Use of Alcohol: denied  Rehab History: no   Tobacco Use: no  Use of OTC: no     Legal History:  Past Charges/Incarcerations: no   Pending charges: no     Family Psychiatric History:   Unknown     Psychosocial Stressors: health  Functioning Relationships: good support system

## 2019-07-11 NOTE — SUBJECTIVE & OBJECTIVE
"Interval HPI:   Overnight events: Remains in TSU, NAEON. BG elevated yesterday evening.  Overall fairly well controlled with scheduled insulin plus correction scale. Prednisone 20 mg; steroids per primary.  Eating:   <25%  Nausea: No  Hypoglycemia and intervention: No  Fever: No  TPN and/or TF: No    BP (!) 147/90 (BP Location: Left arm, Patient Position: Lying)   Pulse 84   Temp 99.1 °F (37.3 °C) (Oral)   Resp 18   Ht 5' 1" (1.549 m)   Wt 59.4 kg (131 lb)   LMP 10/02/2016   SpO2 (!) 94%   Breastfeeding? No   BMI 24.75 kg/m²     Labs Reviewed and Include    Recent Labs   Lab 07/11/19  0500   GLU 87   CALCIUM 8.5*   ALBUMIN 2.7*   PROT 5.8*      K 4.2   CO2 24      BUN 13   CREATININE 1.0   ALKPHOS 153*   ALT 23   AST 20   BILITOT 0.4     Lab Results   Component Value Date    WBC 5.97 07/11/2019    HGB 9.7 (L) 07/11/2019    HCT 32.0 (L) 07/11/2019    MCV 95 07/11/2019     (L) 07/11/2019     No results for input(s): TSH, FREET4 in the last 168 hours.  Lab Results   Component Value Date    HGBA1C 4.7 01/09/2019       Nutritional status:   Body mass index is 24.75 kg/m².  Lab Results   Component Value Date    ALBUMIN 2.7 (L) 07/11/2019    ALBUMIN 2.4 (L) 07/10/2019    ALBUMIN 2.4 (L) 07/09/2019     Lab Results   Component Value Date    PREALBUMIN 15 (L) 05/29/2014    PREALBUMIN 11 (L) 05/09/2014    PREALBUMIN 18 (L) 03/19/2014       Estimated Creatinine Clearance: 68 mL/min (based on SCr of 1 mg/dL).    Accu-Checks  Recent Labs     07/09/19  1151 07/09/19  1836 07/09/19  2058 07/09/19  2103 07/09/19  2105 07/09/19  2112 07/10/19  0858 07/10/19  1157 07/10/19  1814 07/10/19  1943   POCTGLUCOSE 130* 278* 490* 290* 275* 261* 96 137* 210* 204*       Current Medications and/or Treatments Impacting Glycemic Control  Immunotherapy:    Immunosuppressants         Stop Route Frequency     mycophenolate capsule 500 mg      -- Oral 2 times daily        Steroids:   Hormones (From admission, onward)    " Start     Stop Route Frequency Ordered    07/02/19 0900  predniSONE tablet 20 mg      -- Oral Daily 07/01/19 1148    06/26/19 1827  dexAMETHasone sodium phos (PF) 10 mg/mL injection     Note to Pharmacy:  Created by cabinet override    06/27 0629 06/26/19 1827        Pressors:    Autonomic Drugs (From admission, onward)    None        Hyperglycemia/Diabetes Medications:   Antihyperglycemics (From admission, onward)    Start     Stop Route Frequency Ordered    07/06/19 1135  insulin aspart U-100 pen 3 Units      -- SubQ As needed (PRN) 07/06/19 1036    07/06/19 0815  insulin aspart U-100 pen 4-6 Units      -- SubQ 3 times daily with meals 07/06/19 0804    07/02/19 1543  insulin aspart U-100 pen 1-10 Units      -- SubQ Before meals & nightly PRN 07/02/19 1448

## 2019-07-11 NOTE — PT/OT/SLP PROGRESS
"  Speech Language Pathology Treatment    Patient Name:  Juanita Ibarra   MRN:  [id number]  Admitting Diagnosis: S/P lung transplant    Recommendations:                 General Recommendations:  Dysphagia therapy  Diet recommendations:  Dental Soft, Liquid Diet Level: Nectar Thick   Aspiration Precautions: 1 bite/sip at a time, Assistance with thickening liquids, HOB to 90 degrees, Remain upright 30 minutes post meal and Small bites/sips   General Precautions: Standard, fall, neutropenic, contact, aspiration  Communication strategies:  none    Subjective     Patient awake;alert. Seated upright at the bedside chair. Spouse present.     Objective:     Has the patient been evaluated by SLP for swallowing?   Yes  Keep patient NPO? No   Current Respiratory Status: room air       Handout of dysphagia exercises which was left during previous session  reviewed with patient. ST with concerns for patient independent completion of exercises in order for improve swallow function. ST reviewed need for completion of swallow exercises in order for progress in swallow safety and repeat MBSS. ST stated MBSS beneficial only after 2-4 weeks of dysphagia therapy. At this time patient stated "that long?". Patient then expressed concerns regarding previous left VF implant and concerns for damage in most recent surgury.  Patient very distracted by internal and external stimuli, patient only completed falsetto /I/ x1. Patient often reporting "I can't do that" with without attempts at completion. Patient continues to be disengaged in therapy.  Patient provided much motivation and encouragement to complete swallow exercises outside of sessions for hopeful resumption of thin liquids and for increased swallow safety/strength. Patient verbalized understanding.     Assessment:     Juanita Ibarra is a 29 y.o. female with an SLP diagnosis of Dysphagia.      Goals:   Multidisciplinary Problems     SLP Goals        Problem: SLP Goal "    Goal Priority Disciplines Outcome   SLP Goal     SLP    Description:  Speech Language Pathology Goals  Goals expected to be met by 7/4:  1. Patient will participate in ongoing swallow assessment.   2. When determined by SLP, patient may benefit from a repeat modified barium swallow study to further objectively assess swallow function/safety.                         Plan:     · Patient to be seen:  2 x/week   · Plan of Care expires:  07/27/19  · Plan of Care reviewed with:  patient, spouse   · SLP Follow-Up:  Yes       Discharge recommendations:  rehabilitation facility   Barriers to Discharge:  None    Time Tracking:     SLP Treatment Date:   07/11/19  Speech Start Time:  1045  Speech Stop Time:  1101     Speech Total Time (min):  16 min    Billable Minutes: Treatment Swallowing Dysfunction 10 and Seld Care/Home Management Training 10    Emily Abadie, CCC-SLP  07/11/2019

## 2019-07-11 NOTE — PLAN OF CARE
Problem: SLP Goal  Goal: SLP Goal  Speech Language Pathology Goals  Goals expected to be met by 7/4:  1. Patient will participate in ongoing swallow assessment.   2. When determined by SLP, patient may benefit from a repeat modified barium swallow study to further objectively assess swallow function/safety.        Goals remain appropriate.   Emily P. Abadie M.S., CCC-SLP  Speech Language Pathologist  [phone]  07/11/2019

## 2019-07-11 NOTE — PT/OT/SLP PROGRESS
"Physical Therapy Treatment    Patient Name:  Juanita Ibarra   MRN:  [id number]    Recommendations:     Discharge Recommendations:  rehabilitation facility   Discharge Equipment Recommendations: commode, walker, rolling   Barriers to discharge: decreased functional mobility requiring increased assistance    Assessment:     Juanita Ibarra is a 29 y.o. female admitted with a medical diagnosis of S/P lung transplant.  She presents with the following impairments/functional limitations:  weakness, impaired endurance, impaired self care skills, impaired functional mobilty, gait instability, decreased lower extremity function, decreased safety awareness, impaired balance.    Rehab Prognosis: Good; patient would benefit from acute skilled PT services to address these deficits and reach maximum level of function.    Recent Surgery: Procedure(s) (LRB):  Bronchoscopy (N/A) 6 Days Post-Op    Plan:     During this hospitalization, patient to be seen 5 x/week to address the identified rehab impairments via gait training, therapeutic activities, therapeutic exercises, neuromuscular re-education and progress toward the following goals:    · Plan of Care Expires:  07/17/19    Subjective     Chief Complaint: fatigue and pain  Patient/Family Comments/goals: "Do I have to do this today?"  Pain/Comfort:  · Pain Rating 1: other (see comments)(unrated c/o pain)  · Location - Orientation 1: generalized  · Location 1: (generalized; no location specified)  · Pain Addressed 1: Pre-medicate for activity, Reposition, Distraction, Cessation of Activity, Nurse notified  · Pain Rating Post-Intervention 1: other (see comments)(unrated)      Objective:     Communicated with NSG prior to session.  Patient found up in chair with telemetry, central line upon PTA entry to room.     General Precautions: Standard, fall, neutropenic, contact, aspiration   Orthopedic Precautions:N/A   Braces: N/A     Functional Mobility:  · Bed Mobility:   "   · Transfers:     · Sit to Stand:  minimum assistance and moderate assistance with rolling walker  · Gait: Pt ambulates 10 ft x 2 trials with RW and Mod to Max A. Pt with B flexed knees in all phases and short step length. Pt requires Max A  for safety and to maintain balance as pt fatigues and attempts to sit prior to reaching chair on both trials.       AM-PAC 6 CLICK MOBILITY  Turning over in bed (including adjusting bedclothes, sheets and blankets)?: 3  Sitting down on and standing up from a chair with arms (e.g., wheelchair, bedside commode, etc.): 2  Moving from lying on back to sitting on the side of the bed?: 2  Moving to and from a bed to a chair (including a wheelchair)?: 2  Need to walk in hospital room?: 2  Climbing 3-5 steps with a railing?: 1  Basic Mobility Total Score: 12       Therapeutic Activities and Exercises:   Pt assisted with functional mobility as noted above.     Patient left up in chair with all lines intact, call button in reach and NSG notified..    GOALS:   Multidisciplinary Problems     Physical Therapy Goals        Problem: Physical Therapy Goal    Goal Priority Disciplines Outcome Goal Variances Interventions   Physical Therapy Goal     PT, PT/OT Ongoing (interventions implemented as appropriate)     Description:  Goals to be met by: 19    Patient will increase functional independence with mobility by performin. Supine <> sit with stand-by assistance.  2. Sit to stand transfer from bedside chair with contact guard assistance  3. Pt to perform stand pivot transfer with Tonio, with use of RW.  4. Gait  x 50 feet with Tonio, with use of RW.  5.  Pt to perform and be compliant with exercise program in sitting to improve BLE strength and endurance                    Time Tracking:     PT Received On: 19  PT Start Time: 918     PT Stop Time: 949  PT Total Time (min): 31 min     Billable Minutes: Gait Training 15 and Therapeutic Activity 16    Treatment Type:  Treatment  PT/PTA: PTA     PTA Visit Number: 1     Philip Medrano, PTA  07/11/2019

## 2019-07-11 NOTE — ASSESSMENT & PLAN NOTE
-sees a pain management specialist   -Pain regimen adjusted to home regimen of MS Contin 15 mg BID with oxy 5 and oxy 10 available as needed

## 2019-07-11 NOTE — SUBJECTIVE & OBJECTIVE
Interval History 7/11/2019:  Patient is seen for follow-up rehab evaluation and recommendations: Participating with therapy. Lethargy somewhat improved but still present on exam.    HPI, Past Medical, Family, and Social History remains the same as documented in the initial encounter.    Scheduled Medications:    ceftolozane-tazobactam  1,500 mg Intravenous Q8H    cetirizine  10 mg Oral Daily    dapsone  100 mg Oral Daily    enoxaparin  40 mg Subcutaneous Daily    famotidine  20 mg Oral BID    gabapentin  300 mg Oral TID    insulin aspart U-100  4-6 Units Subcutaneous TIDWM    levalbuterol  1.25 mg Nebulization Q6H WAKE    lipase-protease-amylase 24,000-76,000-120,000 units  4 capsule Oral TID WM    metoprolol tartrate  25 mg Oral TID    micafungin (MYCAMINE) IVPB  100 mg Intravenous Q24H    morphine  15 mg Oral Q12H    mycophenolate  500 mg Oral BID    predniSONE  20 mg Oral Daily    QUEtiapine  50 mg Oral QHS    sodium chloride 7%  4 mL Nebulization Q12H    valGANciclovir  450 mg Oral BID    venlafaxine  150 mg Oral QHS    venlafaxine  37.5 mg Oral QHS    voriconazole (VFEND) IVPB (wt < 83 kg)  4 mg/kg Intravenous Q12H       Diagnostic Results: Labs: Reviewed    PRN Medications: sodium chloride, acetaminophen, bisacodyl, Dextrose 10% Bolus, Dextrose 10% Bolus, diphenhydrAMINE, glucagon (human recombinant), glucose, glucose, insulin aspart U-100, insulin aspart U-100, lactulose, lipase-protease-amylase 24,000-76,000-120,000 units, LORazepam, magnesium sulfate IVPB, metoclopramide HCl, metoprolol, naloxone, ondansetron, ondansetron, oxyCODONE, oxyCODONE, potassium chloride in water **AND** potassium chloride in water **AND** potassium chloride in water, promethazine (PHENERGAN) IVPB    Review of Systems   Constitutional: Positive for activity change and fatigue. Negative for chills.   HENT: Negative for trouble swallowing and voice change.    Eyes: Negative for photophobia and visual disturbance.    Respiratory: Positive for cough and shortness of breath.         Secretions   Cardiovascular: Positive for chest pain. Negative for palpitations.   Gastrointestinal: Positive for diarrhea and nausea. Negative for vomiting.   Genitourinary: Negative for difficulty urinating and flank pain.   Musculoskeletal: Positive for arthralgias, back pain, gait problem and myalgias.   Skin: Negative for color change and rash.   Allergic/Immunologic: Positive for immunocompromised state.   Neurological: Positive for weakness. Negative for speech difficulty.   Psychiatric/Behavioral: Negative for confusion. The patient is nervous/anxious.      Objective:     Vital Signs (Most Recent):  Temp: 99.1 °F (37.3 °C) (07/11/19 0405)  Pulse: 84 (07/11/19 0905)  Resp: 18 (07/11/19 0905)  BP: (!) 147/90 (07/11/19 0405)  SpO2: (!) 94 % (07/11/19 0905)    Vital Signs (24h Range):  Temp:  [97.7 °F (36.5 °C)-100.1 °F (37.8 °C)] 99.1 °F (37.3 °C)  Pulse:  [70-84] 84  Resp:  [15-18] 18  SpO2:  [90 %-96 %] 94 %  BP: (131-160)/() 147/90     Physical Exam   Constitutional: She appears well-developed and well-nourished. She appears lethargic. She is easily aroused.   HENT:   Head: Normocephalic and atraumatic.   Eyes: Right eye exhibits no discharge. Left eye exhibits no discharge.   Neck: Neck supple.   Cardiovascular: Normal rate and intact distal pulses.   Pulmonary/Chest: Effort normal. No respiratory distress.   On RA   Abdominal: Soft. There is no tenderness.   Musculoskeletal: She exhibits no edema or deformity.   Generalized deconditioning    Neurological: She is easily aroused. She appears lethargic.    follows commands  Some lethargy noted during exam     Skin: Skin is warm and dry.   Psychiatric: She has a normal mood and affect. Her behavior is normal.   Vitals reviewed.

## 2019-07-11 NOTE — PROGRESS NOTES
[facility]  Physical Medicine & Rehab  Progress Note    Patient Name: Juanita Ibarra  MRN: [id number]  Admission Date: 6/17/2019  Length of Stay: 23 days  Attending Physician: Jake Alvarez MD    Subjective:     Principal Problem:S/P lung transplant    Hospital Course:   07/8/2019: OT-Bed mobility CGA-MaxA .  Sit to stand Мария & RW and transfers Min-modA.  Ambulated 3 steps Мария & RW with complaints of fatigue.  Toileting Мария and LBD SV.  07/9/2019:  PT-Sit to stand ModA.  Ambulated 3 steps + 3 steps + 4 steps ModA & RW. On each trial, pt required mod-max A to control descent into sitting in chair 2/2 sudden descent, which pt attributed to fatigue.    07/10/2019: Sit to stand Мария-ModA and transfers ModA. Unable to take steps 2/2 pain and weakness.      Interval History 7/11/2019:  Patient is seen for follow-up rehab evaluation and recommendations: Participating with therapy. Lethargy somewhat improved but still present on exam.    HPI, Past Medical, Family, and Social History remains the same as documented in the initial encounter.    Scheduled Medications:    ceftolozane-tazobactam  1,500 mg Intravenous Q8H    cetirizine  10 mg Oral Daily    dapsone  100 mg Oral Daily    enoxaparin  40 mg Subcutaneous Daily    famotidine  20 mg Oral BID    gabapentin  300 mg Oral TID    insulin aspart U-100  4-6 Units Subcutaneous TIDWM    levalbuterol  1.25 mg Nebulization Q6H WAKE    lipase-protease-amylase 24,000-76,000-120,000 units  4 capsule Oral TID WM    metoprolol tartrate  25 mg Oral TID    micafungin (MYCAMINE) IVPB  100 mg Intravenous Q24H    morphine  15 mg Oral Q12H    mycophenolate  500 mg Oral BID    predniSONE  20 mg Oral Daily    QUEtiapine  50 mg Oral QHS    sodium chloride 7%  4 mL Nebulization Q12H    valGANciclovir  450 mg Oral BID    venlafaxine  150 mg Oral QHS    venlafaxine  37.5 mg Oral QHS    voriconazole (VFEND) IVPB (wt < 83 kg)  4 mg/kg Intravenous  Q12H       Diagnostic Results: Labs: Reviewed    PRN Medications: sodium chloride, acetaminophen, bisacodyl, Dextrose 10% Bolus, Dextrose 10% Bolus, diphenhydrAMINE, glucagon (human recombinant), glucose, glucose, insulin aspart U-100, insulin aspart U-100, lactulose, lipase-protease-amylase 24,000-76,000-120,000 units, LORazepam, magnesium sulfate IVPB, metoclopramide HCl, metoprolol, naloxone, ondansetron, ondansetron, oxyCODONE, oxyCODONE, potassium chloride in water **AND** potassium chloride in water **AND** potassium chloride in water, promethazine (PHENERGAN) IVPB    Review of Systems   Constitutional: Positive for activity change and fatigue. Negative for chills.   HENT: Negative for trouble swallowing and voice change.    Eyes: Negative for photophobia and visual disturbance.   Respiratory: Positive for cough and shortness of breath.         Secretions   Cardiovascular: Positive for chest pain. Negative for palpitations.   Gastrointestinal: Positive for diarrhea and nausea. Negative for vomiting.   Genitourinary: Negative for difficulty urinating and flank pain.   Musculoskeletal: Positive for arthralgias, back pain, gait problem and myalgias.   Skin: Negative for color change and rash.   Allergic/Immunologic: Positive for immunocompromised state.   Neurological: Positive for weakness. Negative for speech difficulty.   Psychiatric/Behavioral: Negative for confusion. The patient is nervous/anxious.      Objective:     Vital Signs (Most Recent):  Temp: 99.1 °F (37.3 °C) (07/11/19 0405)  Pulse: 84 (07/11/19 0905)  Resp: 18 (07/11/19 0905)  BP: (!) 147/90 (07/11/19 0405)  SpO2: (!) 94 % (07/11/19 0905)    Vital Signs (24h Range):  Temp:  [97.7 °F (36.5 °C)-100.1 °F (37.8 °C)] 99.1 °F (37.3 °C)  Pulse:  [70-84] 84  Resp:  [15-18] 18  SpO2:  [90 %-96 %] 94 %  BP: (131-160)/() 147/90     Physical Exam   Constitutional: She appears well-developed and well-nourished. She appears lethargic. She is easily  aroused.   HENT:   Head: Normocephalic and atraumatic.   Eyes: Right eye exhibits no discharge. Left eye exhibits no discharge.   Neck: Neck supple.   Cardiovascular: Normal rate and intact distal pulses.   Pulmonary/Chest: Effort normal. No respiratory distress.   On RA   Abdominal: Soft. There is no tenderness.   Musculoskeletal: She exhibits no edema or deformity.   Generalized deconditioning    Neurological: She is easily aroused. She appears lethargic.    follows commands  Some lethargy noted during exam  Skin: Skin is warm and dry.   Psychiatric: She has a normal mood and affect. Her behavior is normal.   Vitals reviewed.    Assessment/Plan:      Aspergillus  -on voriconazole IV    Impaired functional mobility and endurance  See hospital course for functional, cognitive/speech/language, and nutrition/swallow status.      Recommendations  -  Encourage mobility, OOB in chair at least 3 hours per day, and early ambulation as appropriate   -  PT/OT evaluate and treat  -  SLP speech and cognitive evaluate and treat  -  Monitor sleep disturbances and establish consistent sleep-wake cycle  -  Monitor for bowel and bladder dysfunction  -  Monitor for shoulder pain, subluxation, & spasticity  -  Monitor for and prevent skin breakdown and pressure ulcers  · Early mobility, repositioning/weight shifting every 20-30 minutes when sitting, turn patient every 2 hours, proper mattress/overlay and chair cushioning, pressure relief/heel protector boots  -  DVT prophylaxis (if appropriate)  -  Reviewed discharge options (IP rehab, SNF, HH therapy, and OP therapy)    Candida glabrata infection  -on Micafungin IV     Lung transplant status, bilateral  -s/p b/l lung transplant 6/18/19 2/2 progressive end stage CARLOS EDUARDO  -complicated by hypercapnic resp failure, now extubated  -Bronchoscopy 7/5 with  pseudomonas  -currently on RA with sat ~90%    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      Norma Bhatia NP  Department of Physical Medicine & Rehab   [facility]

## 2019-07-11 NOTE — PLAN OF CARE
Problem: Physical Therapy Goal  Goal: Physical Therapy Goal  Goals to be met by: 19    Patient will increase functional independence with mobility by performin. Supine <> sit with stand-by assistance.  2. Sit to stand transfer from bedside chair with contact guard assistance  3. Pt to perform stand pivot transfer with Tonio, with use of RW.  4. Gait  x 50 feet with Tonio, with use of RW.  5.  Pt to perform and be compliant with exercise program in sitting to improve BLE strength and endurance   Outcome: Ongoing (interventions implemented as appropriate)  Goals remain appropriate.

## 2019-07-11 NOTE — SUBJECTIVE & OBJECTIVE
Subjective:     Interval History: No acute events overnight. Remains on RA with sats 90-94%. Tmax 100.1 overnight. Will place referral to LTACH. More lethargic on exam today than yesterday. Oxy 10 mg decreased to Q6hrs. Plan for Zyrtec prior to AM dose of Zerbaxa and then will use Benadryl prior to next two doses. Continued discussions with patient regarding her need to improve her physical deconditioning and to continue compliance with IS use.  Psych consulted, appreciate recs.      Continuous Infusions:    Scheduled Meds:   ceftolozane-tazobactam  1,500 mg Intravenous Q8H    cetirizine  10 mg Oral Daily    dapsone  100 mg Oral Daily    enoxaparin  40 mg Subcutaneous Daily    famotidine  20 mg Oral BID    gabapentin  300 mg Oral TID    insulin aspart U-100  4-6 Units Subcutaneous TIDWM    levalbuterol  1.25 mg Nebulization Q6H WAKE    lipase-protease-amylase 24,000-76,000-120,000 units  4 capsule Oral TID WM    metoprolol tartrate  25 mg Oral TID    micafungin (MYCAMINE) IVPB  100 mg Intravenous Q24H    morphine  15 mg Oral Q12H    mycophenolate  500 mg Oral BID    predniSONE  20 mg Oral Daily    QUEtiapine  50 mg Oral QHS    sodium chloride 7%  4 mL Nebulization Q12H    tacrolimus  0.5 mg Oral BID    valGANciclovir  450 mg Oral BID    venlafaxine  150 mg Oral QHS    venlafaxine  37.5 mg Oral QHS    voriconazole (VFEND) IVPB (wt < 83 kg)  4 mg/kg Intravenous Q12H     PRN Meds:sodium chloride, acetaminophen, bisacodyl, Dextrose 10% Bolus, Dextrose 10% Bolus, diphenhydrAMINE, glucagon (human recombinant), glucose, glucose, insulin aspart U-100, insulin aspart U-100, lactulose, levalbuterol, lipase-protease-amylase 24,000-76,000-120,000 units, LORazepam, magnesium sulfate IVPB, metoclopramide HCl, metoprolol, naloxone, ondansetron, ondansetron, oxyCODONE, potassium chloride in water **AND** potassium chloride in water **AND** potassium chloride in water, promethazine (PHENERGAN) IVPB    Review of  patient's allergies indicates:   Allergen Reactions    Albuterol Palpitations    Colistin Anaphylaxis    Vancomycin analogues      Infusion reaction that does not resolve with slowing  Pt. States she can tolerate it when given with 50 mg Benadryl and ran over 3 hours    Neupogen [filgrastim] Other (See Comments)     Ostealgia after five daily doses of 300 mcg.      Bactrim [sulfamethoxazole-trimethoprim] Hives    Ceftazidime Hives     Pt stated can tolerate cefapine not ceftazidime    Ceftazidime     Dronabinol Other (See Comments)     Mental changes/hallucinations    Haldol [haloperidol lactate] Other (See Comments)     Seizure like activity    Nsaids (non-steroidal anti-inflammatory drug)      Cannot have due to lung transplant    Adhesive Rash     Cloth tape- please use tegaderm or paper tape    Aztreonam Rash    Ciprofloxacin Nausea And Vomiting     Projectile N/V, per patient.  Unwilling to retry therapy.       Review of Systems   Constitutional: Positive for appetite change and fatigue. Negative for chills, diaphoresis and fever.   HENT: Negative for congestion, postnasal drip, rhinorrhea, sinus pressure and sinus pain.    Respiratory: Positive for cough and shortness of breath. Negative for wheezing.    Cardiovascular: Positive for chest pain (incision site). Negative for palpitations and leg swelling.   Gastrointestinal: Positive for diarrhea and nausea. Negative for abdominal distention, abdominal pain, constipation and vomiting.   Genitourinary: Negative for decreased urine volume, difficulty urinating, dysuria and urgency.   Musculoskeletal: Positive for back pain (chronic). Negative for neck pain and neck stiffness.   Skin: Positive for pallor. Negative for rash and wound.   Allergic/Immunologic: Positive for immunocompromised state.   Neurological: Positive for weakness. Negative for dizziness, seizures, syncope and headaches.   Psychiatric/Behavioral: Negative for agitation, confusion and  decreased concentration. The patient is nervous/anxious.      Objective:   Physical Exam   Constitutional: She is oriented to person, place, and time. She appears well-developed and well-nourished. She is easily aroused. No distress.   HENT:   Head: Normocephalic and atraumatic.   Right Ear: External ear normal.   Left Ear: External ear normal.   Nose: Nose normal.   Eyes: Conjunctivae and EOM are normal. No scleral icterus.   Neck: Normal range of motion. Neck supple. No JVD present.   Cardiovascular: Normal rate, regular rhythm and normal heart sounds. Exam reveals no friction rub.   No murmur heard.  Pulmonary/Chest: Effort normal. No accessory muscle usage. No respiratory distress. She has decreased breath sounds in the right lower field and the left lower field. She has wheezes. She has no rhonchi. She has no rales.   Abdominal: Soft. Bowel sounds are normal. She exhibits no distension. There is no tenderness.   Musculoskeletal: Normal range of motion. She exhibits no edema.   Neurological: She is alert, oriented to person, place, and time and easily aroused.   Intermittent episodes of lethargy   Skin: Skin is warm and dry. She is not diaphoretic. No erythema.   Psychiatric: Judgment normal. Her mood appears not anxious. Her speech is delayed. She is slowed. She exhibits abnormal recent memory.   Nursing note and vitals reviewed.        Vital Signs (Most Recent):  Temp: 98.8 °F (37.1 °C) (07/11/19 1137)  Pulse: 93 (07/11/19 1137)  Resp: 17 (07/11/19 1137)  BP: (!) 134/91 (07/11/19 1137)  SpO2: (!) 92 % (07/11/19 1137) Vital Signs (24h Range):  Temp:  [97.7 °F (36.5 °C)-100.1 °F (37.8 °C)] 98.8 °F (37.1 °C)  Pulse:  [70-93] 93  Resp:  [15-18] 17  SpO2:  [90 %-96 %] 92 %  BP: (131-160)/() 134/91     Weight: 59.4 kg (131 lb)  Body mass index is 24.75 kg/m².      Intake/Output Summary (Last 24 hours) at 7/11/2019 1202  Last data filed at 7/11/2019 0430  Gross per 24 hour   Intake 1460 ml   Output 1800 ml    Net -340 ml       Ventilator Data:          Hemodynamic Parameters:       Lines/Drains:       Introducer right internal jugular (Active)   Specific Qualities Capped 6/30/2019  7:10 AM   Dressing Status Clean;Dry;Biopatch in place;Intact 6/30/2019  7:10 AM   Dressing Intervention Dressing reinforced 6/29/2019  3:00 AM   Dressing Change Due 06/29/19 6/30/2019  3:00 AM   Daily Line Review Performed 6/30/2019  7:10 AM   Number of days:             Port A Cath Single Lumen 06/24/14 1200 right subclavian (Active)   Accessed by: Radha Lanza 6/24/2019  5:00 AM   Dressing Type Transparent 6/30/2019  7:10 AM   Dressing Status Biopatch in place;Clean;Dry;Intact 6/30/2019  7:10 AM   Dressing Intervention Dressing reinforced 6/29/2019  3:00 AM   Dressing Change Due 06/30/19 6/30/2019  3:00 AM   Line Status Infusing 6/30/2019  7:10 AM   Flush Performed Yes 6/30/2019  7:10 AM   Date to be Reflushed 06/18/19 6/29/2019  3:00 AM   Daily Line Review Performed 6/30/2019  7:10 AM   Type of Needle Nicole 6/30/2019  3:00 AM   Gauge 20 6/30/2019  3:00 AM   Needle Length 1 in 6/30/2019  3:00 AM   Needle Status Left in place 6/30/2019  3:00 AM   Needle Insertion Date 06/24/19 6/30/2019  3:00 AM   Needle Insertion Time 0500 6/26/2019  3:00 AM   Number of days: 1831            Percutaneous Central Line Insertion/Assessment - double lumen  06/18/19 0510 (Active)   Dressing biopatch in place;dressing dry and intact 6/30/2019  7:10 AM   Securement secured w/ sutures 6/30/2019  7:10 AM   Additional Site Signs no erythema;no warmth 6/30/2019  7:10 AM   Distal Patency/Care infusing 6/30/2019  7:10 AM   Proximal Patency/Care infusing 6/30/2019  7:10 AM   Waveform normal 6/30/2019  7:10 AM   Line Interventions line leveled/zeroed 6/30/2019  7:10 AM   Dressing Change Due 06/29/19 6/30/2019  3:00 AM   Daily Line Review Performed 6/30/2019  7:10 AM   Number of days: 12            Peripheral IV - Single Lumen 06/27/19 0917 22 G Right Forearm (Active)    Site Assessment Clean;Dry;Intact;No redness;No swelling 6/30/2019  7:10 AM   Line Status Infusing 6/30/2019  7:10 AM   Dressing Status Clean;Dry;Intact 6/30/2019  7:10 AM   Dressing Intervention Dressing reinforced 6/29/2019  3:00 AM   Dressing Change Due 07/01/19 6/30/2019  3:00 AM   Site Change Due 07/01/19 6/29/2019  7:00 PM   Reason Not Rotated Not due 6/30/2019  7:10 AM   Number of days: 3            Chest Tube 06/29/19 1655 1 Right Fourth intercostal space;Midaxillary 7 Fr. (Active)   Chest Tube WDL WDL 6/30/2019  7:10 AM   Function -20 cm H2O 6/30/2019  7:10 AM   Air Leak/Fluctuation air leak present 6/30/2019  7:10 AM   Safety all tubing connections taped;2 rubber-tipped hemostats w/ patient;all connections secured;suction checked 6/30/2019  7:10 AM   Securement tubing secured to body distal to insertion site w/ tape 6/30/2019  7:10 AM   Patency Intervention Tip/tilt 6/30/2019  7:10 AM   Drainage Description Serosanguineous 6/30/2019  7:10 AM   Dressing Appearance occlusive gauze dressing intact 6/30/2019  7:10 AM   Dressing Care dressing changed 6/30/2019 10:00 AM   Left Subcutaneous Emphysema none present 6/30/2019  7:10 AM   Right Subcutaneous Emphysema none present 6/30/2019  7:10 AM   Site Assessment Clean;Dry;Intact;No redness;No swelling;Ecchymotic 6/30/2019 10:00 AM   Surrounding Skin Dry;Intact 6/30/2019  7:10 AM   Output (mL) 0 mL 6/30/2019 10:00 AM   Number of days: 0            Y Chest Tube 3 and 4 06/18/19 1151 3 Left Pleural 19 Fr. 4 Left Mediastinal 19 Fr. (Active)   Function -20 cm H2O 6/30/2019  7:10 AM   Air Leak/Fluctuation air leak not present 6/30/2019  7:10 AM   Safety all tubing connections taped;2 rubber-tipped hemostats w/ patient;all connections secured;suction checked 6/30/2019  7:10 AM   Securement tubing secured to body distal to insertion site w/ tape 6/30/2019  7:10 AM   Left Subcutaneous Emphysema none present 6/30/2019  7:10 AM   Right Subcutaneous Emphysema none present  6/30/2019  7:10 AM   Patency Intervention Tip/tilt 6/30/2019  7:10 AM   Drainage Description 3 Serosanguineous 6/30/2019  7:10 AM   Tube 3 Dressing Appearance occlusive gauze dressing intact 6/30/2019  7:10 AM   Tube 3 Dressing Care dressing changed 6/30/2019 10:00 AM   Site Assessment 3 Clean;Dry;Intact 6/30/2019 10:00 AM   Surrounding Skin 3 Dry;Intact 6/30/2019  7:10 AM   Drainage Description 4 Serosanguineous 6/30/2019  7:10 AM   Tube 4 Dressing Appearance occlusive gauze dressing intact 6/30/2019  7:10 AM   Tube 4 Dressing Care dressing changed 6/30/2019 10:00 AM   Site Assessment 4 Clean;Dry;Intact 6/30/2019 10:00 AM   Surrounding Skin 4 Dry;Intact 6/30/2019  7:10 AM   Output (mL) 0 mL 6/30/2019 10:00 AM   Number of days: 11       Significant Labs:  CBC:  Recent Labs   Lab 07/11/19  0500   WBC 5.97   RBC 3.38*   HGB 9.7*   HCT 32.0*   *   MCV 95   MCH 28.7   MCHC 30.3*     BMP:  Recent Labs   Lab 07/11/19  0500      K 4.2      CO2 24   BUN 13   CREATININE 1.0   CALCIUM 8.5*      Tacrolimus Levels:  Recent Labs   Lab 07/11/19  0500   TACROLIMUS 5.6     Microbiology:  Microbiology Results (last 7 days)     Procedure Component Value Units Date/Time    Fungus culture [[id number]]  (Abnormal) Collected:  07/05/19 1445    Order Status:  Completed Specimen:  Respiratory from Bronchial Wash Updated:  07/11/19 0852     Fungus (Mycology) Culture BRANDI GLABRATA  Many      Narrative:       Bronchial Wash    Fungus culture [[id number]] Collected:  06/25/19 0900    Order Status:  Completed Specimen:  Respiratory from Bronchial Wash Updated:  07/10/19 0918     Fungus (Mycology) Culture Culture in progress      No fungus isolated after 2 weeks    Narrative:       Bronchial Wash    AFB Culture & Smear [[id number]] Collected:  07/05/19 1445    Order Status:  Completed Specimen:  Respiratory from Bronchial Wash Updated:  07/08/19 1540     AFB Culture & Smear Culture in progress     AFB CULTURE STAIN No acid  fast bacilli seen.    Narrative:       Bronchial Wash    Culture, Respiratory [[id number]]  (Abnormal)  (Susceptibility) Collected:  07/05/19 1445    Order Status:  Completed Specimen:  Respiratory from Bronchial Wash Updated:  07/08/19 1100     Respiratory Culture No S aureus isolated.      PSEUDOMONAS AERUGINOSA   Moderate  Normal respiratory anibal also present       Gram Stain (Respiratory) Few WBC's     Gram Stain (Respiratory) Few Gram positive rods     Gram Stain (Respiratory) Few budding yeast    Narrative:       Bronchial Wash          I have reviewed all pertinent labs within the past 24 hours.    Diagnostic Results:  Chest X-Ray: Vascular catheters, right pleural drain and monitoring leads remain.  There is some high-density material in the upper abdomen likely from the modified barium swallow.  Heart is enlarged there is mild increase in the pulmonary vascular markings.  Persistent parenchymal opacification bilaterally.  No significant pneumothorax seen.  Probable right pleural effusion.

## 2019-07-11 NOTE — PROGRESS NOTES
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  30yo woman w/a history of CF (c/b pancreatic insufficiency, sinus disease, MRSA/Pseudomonas colonization c/b numerous antibiotics allergies, and subsequent COPD/ESLD; s/p BOLT 6/12/2014, CMV D+/R-, simulect induction, on maintenance tacro/MMF/pred; c/b multiple episodes of MRSA/Pseudomonas pneumonia/sinusitis, C.glabrata early post-operative colonization 7/2014, A1 rejection 8/2014 s/p pulse SM, prior CMV reactivations, and subsequent grade 3 [facility] EDUARDO relisted for transplant) who was admitted on 6/17/2019 for redo bilateral lung transplant (s/p BOLT 6/18/2019, CMV D-/R+, basiliximab induction, on maintenance tacro/MMF/pred; c/b  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 ID was consulted for recent fevers (on 7/5), SOB, and non-productive cough due to ongoing  Pseudomonas pneumonia (with increasing MDR pattern) and invasive aspergillosis (with positive BAL antigen; multiple risk factors). She remains tenuous but is showing improvement on zerbaxa/vori.    - would continue zerbaxa (anti-histamine premedication given ceftaz allergy in past) for 2wks (stop date: 7/24/2019); isolate sensitive (censored in chart)  - would continue voriconazole for at least 6wks for invasive aspergillosis with 2wks of concurrent micafungin as combination therapy (will ensure adequate coverage of C.glabrata as well -- azole sensitivities pending on Candida isolate to facilitate cessation of micafungin); level to be ordered by ID pharmacist for voriconazole  - remainder of  prophylaxis per protocol  - contact precautions for   - weekly CBC w/diff, CMP faxed to Dr. Juan in ID clinic while on IV antibiotic therapy -- follow-up with him requested      Anticipated Disposition: per primary team    Thank you for your consult. I will sign off. Please contact us if you have any additional questions.     Juanita Francis MD  Transplant ID Attending  [id number]    Juanita Francis MD  Infectious Disease  [facility]    Subjective:     Principal Problem:S/P lung transplant    HPI: No notes on file  Interval History: More oriented today again. Afebrile. Tolerating zerbaxa well still. Some loose stools on antibiotics.    Review of Systems   Constitutional: Positive for appetite change and fatigue. Negative for chills, diaphoresis and fever.   HENT: Negative for congestion, postnasal drip, rhinorrhea, sinus pressure and sinus pain.    Respiratory: Positive for cough and shortness of breath. Negative for wheezing.    Cardiovascular: Negative for chest pain (incision site), palpitations and leg swelling.   Gastrointestinal: Negative for abdominal distention, abdominal pain, constipation, diarrhea, nausea and vomiting.   Genitourinary: Negative for decreased urine volume, difficulty urinating, dysuria and urgency.   Musculoskeletal: Positive for back pain (chronic). Negative for neck pain and neck stiffness.   Skin: Positive for pallor. Negative for rash and wound.   Allergic/Immunologic: Positive for immunocompromised state.   Neurological: Positive for weakness. Negative for dizziness, seizures, syncope and headaches.   Psychiatric/Behavioral: Negative for agitation, confusion and decreased concentration. The patient is nervous/anxious.      Objective:     Vital Signs (Most Recent):  Temp: 99.3 °F (37.4 °C) (07/11/19 1640)  Pulse: 83 (07/11/19 1640)  Resp: 15 (07/11/19 1640)  BP: (!) 146/109 (07/11/19 1640)  SpO2: (!) 94 % (07/11/19 1640) Vital Signs (24h Range):  Temp:  [98.8 °F  (37.1 °C)-100.1 °F (37.8 °C)] 99.3 °F (37.4 °C)  Pulse:  [70-93] 83  Resp:  [15-18] 15  SpO2:  [90 %-97 %] 94 %  BP: (134-160)/() 146/109     Weight: 59.4 kg (131 lb)  Body mass index is 24.75 kg/m².    Estimated Creatinine Clearance: 68 mL/min (based on SCr of 1 mg/dL).    Physical Exam   Constitutional: She is oriented to person, place, and time. She appears well-developed and well-nourished. She is easily aroused. No distress.   HENT:   Head: Normocephalic and atraumatic.   Right Ear: External ear normal.   Left Ear: External ear normal.   Nose: Nose normal.   Eyes: Conjunctivae and EOM are normal. No scleral icterus.   Neck: Normal range of motion. Neck supple. No JVD present.   Cardiovascular: Normal rate, regular rhythm and normal heart sounds. Exam reveals no friction rub.   No murmur heard.  Pulmonary/Chest: Effort normal. No accessory muscle usage. No respiratory distress. She has decreased breath sounds in the right lower field and the left lower field. She has no wheezes. She has no rhonchi. She has no rales.   Abdominal: Soft. Bowel sounds are normal. She exhibits no distension. There is no tenderness.   Musculoskeletal: Normal range of motion. She exhibits no edema.   Neurological: She is alert, oriented to person, place, and time and easily aroused.   Skin: Skin is warm and dry. She is not diaphoretic. No erythema.   Psychiatric: Her speech is normal. Her mood appears not anxious.   Nursing note and vitals reviewed.      Significant Labs:   CBC:   Recent Labs   Lab 07/10/19  0510 07/11/19  0500   WBC 4.34 5.97   HGB 8.6* 9.7*   HCT 28.7* 32.0*   * 140*     CMP:   Recent Labs   Lab 07/10/19  0510 07/11/19  0500    137   K 4.1 4.2    104   CO2 22* 24   * 87   BUN 14 13   CREATININE 1.1 1.0   CALCIUM 7.9* 8.5*   PROT 5.1* 5.8*   ALBUMIN 2.4* 2.7*   BILITOT 0.3 0.4   ALKPHOS 118 153*   AST 14 20   ALT 19 23   ANIONGAP 8 9   EGFRNONAA >60.0 >60.0       Significant Imaging: I  have reviewed all pertinent imaging results/findings within the past 24 hours.

## 2019-07-11 NOTE — ASSESSMENT & PLAN NOTE
Previously with  Pseudomonas from 6/20 and 6/23 bronchial wash. Received Tobramycin x1 on 6/22 and was transitioned from Merrem to Cipro. BAL from 7/5 with  Pseudomonas. Started on Zerbaxa 7/9.  Continue Zerbaxa per ID recs. Pre-med for Zerbaxa first AM dose with Zyrtec. Pre-med with Benadryl 25 mg prior to remaining two daily infusions.

## 2019-07-11 NOTE — PLAN OF CARE
Problem: Occupational Therapy Goal  Goal: Occupational Therapy Goal  Goals to be met by: 7/4/19     Patient will increase functional independence with ADLs by performing:    UE Dressing with Supervision.  LE Dressing with Supervision. - Met on 7/8 with socks   Grooming while standing at sink with Supervision.  Toileting from toilet with Supervision for hygiene and clothing management. - Progressing   Toilet transfer to toilet with Supervision.- Progressing       Outcome: Ongoing (interventions implemented as appropriate)  Pt presented to OT lethargic resulting in increased time provided for all functional activities. Pt progressing with functional mobility noted in her ability to ambulate 5 steps toward commode with stand pivot competed to descend onto commode with SW. Pt continues to present with overall weakness affecting her progress in therapy. Pt with one LOB corrected by therapist due to BLE knee buckling EOB during stand pivot transfer. Standing ADLs initiated at sink; however, pt refused to complete any tasks in standing due to fatigue. Goals re-assessed post session to endure all needs are being met. Pt and spouse verbalized understanding. Pt will continue to benefit from skilled OT to build strength and endurance for ADLs/IADLs.     Comments: Ann Hood OTR/L

## 2019-07-11 NOTE — ASSESSMENT & PLAN NOTE
28yo woman w/a history of CF (c/b pancreatic insufficiency, sinus disease, MRSA/Pseudomonas colonization c/b numerous antibiotics allergies, and subsequent COPD/ESLD; s/p BOLT 6/12/2014, CMV D+/R-, simulect induction, on maintenance tacro/MMF/pred; c/b multiple episodes of MRSA/Pseudomonas pneumonia/sinusitis, C.glabrata early post-operative colonization 7/2014, A1 rejection 8/2014 s/p pulse SM, prior CMV reactivations, and subsequent grade 3 CARLOS EDUARDO relisted for transplant) who was admitted on 6/17/2019 for redo bilateral lung transplant (s/p BOLT 6/18/2019, CMV D-/R+, basiliximab induction, on maintenance tacro/MMF/pred; c/b  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 ID has been consulted for recent fevers (on 7/5), SOB, and non-productive cough due to ongoing  Pseudomonas pneumonia (with increasing MDR pattern) and invasive aspergillosis (with positive BAL antigen; multiple risk factors). She remains tenuous but is showing slow improvement on zerbaxa/vori.    - will continue empiric zerbaxa (anti-histamine premedication given ceftaz allergy in past -- d/w patient and )  - would continue vori and micafungin for both invasive aspergillosis and C.glabrata growth -- sensitivities pending on Candida isolate to ensure vori alone is adequate  - remainder of prophylaxis per protocol  - await pending added novel sensitivities on  Pseudomonas and azole sensitivities on C.glabrata  - contact precautions for   - would send blood cultures with additional fevers

## 2019-07-11 NOTE — ASSESSMENT & PLAN NOTE
Previously on tacrolimus, MMF, and daily prednisone. S/p solumedrol in OR, basiliximab on POD#0 and POD#4. Continue MMF and steroid taper. Will resume tacrolimus 0.5 mg BID.  Monitor daily tacrolimus levels and adjust dose as needed.

## 2019-07-11 NOTE — PROGRESS NOTES
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  28yo woman w/a history of CF (c/b pancreatic insufficiency, sinus disease, MRSA/Pseudomonas colonization c/b numerous antibiotics allergies, and subsequent COPD/ESLD; s/p BOLT 6/12/2014, CMV D+/R-, simulect induction, on maintenance tacro/MMF/pred; c/b multiple episodes of MRSA/Pseudomonas pneumonia/sinusitis, C.glabrata early post-operative colonization 7/2014, A1 rejection 8/2014 s/p pulse SM, prior CMV reactivations, and subsequent grade 3 CARLOS EDUARDO relisted for transplant) who was admitted on 6/17/2019 for redo bilateral lung transplant (s/p BOLT 6/18/2019, CMV D-/R+, basiliximab induction, on maintenance tacro/MMF/pred; c/b  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 ID has been consulted for recent fevers (on 7/5), SOB, and non-productive cough due to ongoing  Pseudomonas pneumonia (with increasing MDR pattern) and invasive aspergillosis (with positive BAL antigen; multiple risk factors). She remains tenuous but is showing slow improvement on zerbaxa/vori.    - will continue empiric zerbaxa (anti-histamine premedication given ceftaz allergy in past -- d/w patient and )  - would continue vori and micafungin for both invasive aspergillosis and C.glabrata growth -- sensitivities pending on Candida isolate to ensure vori alone is adequate  - remainder of prophylaxis per protocol  - await pending added novel sensitivities on  Pseudomonas and azole sensitivities on C.glabrata  - contact precautions for   - would send blood cultures  with additional fevers        Anticipated Disposition: pending improvement    Thank you for your consult. I will follow-up with patient. Please contact us if you have any additional questions.     Juanita Francis MD  Transplant ID Attending  [id number]    Juanita Francis MD  Infectious Disease  [facility]    Subjective:     Principal Problem:S/P lung transplant    HPI: No notes on file  Interval History: More oriented today. Afebrile. Tolerated zerbaxa well. Aspergillus antigen not surprisingly positive and started on vori.    Review of Systems   Constitutional: Positive for appetite change and fatigue. Negative for chills, diaphoresis and fever.   HENT: Negative for congestion, postnasal drip, rhinorrhea, sinus pressure and sinus pain.    Respiratory: Positive for cough and shortness of breath. Negative for wheezing.    Cardiovascular: Negative for chest pain (incision site), palpitations and leg swelling.   Gastrointestinal: Negative for abdominal distention, abdominal pain, constipation, diarrhea, nausea and vomiting.   Genitourinary: Negative for decreased urine volume, difficulty urinating, dysuria and urgency.   Musculoskeletal: Positive for back pain (chronic). Negative for neck pain and neck stiffness.   Skin: Positive for pallor. Negative for rash and wound.   Allergic/Immunologic: Positive for immunocompromised state.   Neurological: Positive for weakness. Negative for dizziness, seizures, syncope and headaches.   Psychiatric/Behavioral: Negative for agitation, confusion and decreased concentration. The patient is nervous/anxious.      Objective:     Vital Signs (Most Recent):  Temp: 98 °F (36.7 °C) (07/10/19 1632)  Pulse: 76 (07/10/19 1900)  Resp: 15 (07/10/19 1632)  BP: (!) 132/90 (07/10/19 1632)  SpO2: (!) 91 % (07/10/19 1632) Vital Signs (24h Range):  Temp:  [97.5 °F (36.4 °C)-98.2 °F (36.8 °C)] 98 °F (36.7 °C)  Pulse:  [66-86] 76  Resp:  [15-19] 15  SpO2:  [90 %-99 %] 91 %  BP:  (130-139)/(85-97) 132/90     Weight: 58.7 kg (129 lb 6.6 oz)  Body mass index is 24.45 kg/m².    Estimated Creatinine Clearance: 61.6 mL/min (based on SCr of 1.1 mg/dL).    Physical Exam   Constitutional: She is oriented to person, place, and time. She appears well-developed and well-nourished. She is easily aroused. No distress.   HENT:   Head: Normocephalic and atraumatic.   Right Ear: External ear normal.   Left Ear: External ear normal.   Nose: Nose normal.   Eyes: Conjunctivae and EOM are normal. No scleral icterus.   Neck: Normal range of motion. Neck supple. No JVD present.   Cardiovascular: Normal rate, regular rhythm and normal heart sounds. Exam reveals no friction rub.   No murmur heard.  Pulmonary/Chest: Effort normal. No accessory muscle usage. No respiratory distress. She has decreased breath sounds in the right lower field and the left lower field. She has no wheezes. She has no rhonchi. She has no rales.   Abdominal: Soft. Bowel sounds are normal. She exhibits no distension. There is no tenderness.   Musculoskeletal: Normal range of motion. She exhibits no edema.   Neurological: She is alert, oriented to person, place, and time and easily aroused.   Skin: Skin is warm and dry. She is not diaphoretic. No erythema.   Psychiatric: Her speech is normal. Her mood appears not anxious.   Nursing note and vitals reviewed.      Significant Labs:   CBC:   Recent Labs   Lab 07/09/19  0530 07/10/19  0510   WBC 5.04 4.34   HGB 8.9* 8.6*   HCT 29.6* 28.7*   * 120*     CMP:   Recent Labs   Lab 07/09/19  0530 07/10/19  0510    139   K 4.6 4.1    109   CO2 21* 22*    112*   BUN 14 14   CREATININE 1.2 1.1   CALCIUM 8.2* 7.9*   PROT 5.4* 5.1*   ALBUMIN 2.4* 2.4*   BILITOT 0.3 0.3   ALKPHOS 122 118   AST 12 14   ALT 21 19   ANIONGAP 8 8   EGFRNONAA >60.0 >60.0       Significant Imaging: I have reviewed all pertinent imaging results/findings within the past 24 hours.

## 2019-07-11 NOTE — HOSPITAL COURSE
"7/11/2019 seen today for initial consult request. Patient appeared excessively tired and asked consultant to come at another time or to speak to her  who was sitting at the bedside.     7/12/2019 Patient was seen today in her room with  at bedside. Appeared brighter in affect and was sitting up in her chair, making better eye contact but irritable, demanding Ativan. Was informed that Ativan was recommended to be 1 mg q8h PRN, at which she responded "my therapist says I can have 2 mg three times a day." Was informed that Ativan affects respiratory drive. This education did not affect patient's opinion.   "

## 2019-07-11 NOTE — PROGRESS NOTES
Notified FELICIA Ramsey that patient is requesting to have telemetry discontinued.  Order received to dc telemetry.  Attempted to place Visimobile monitor on patient.  Patient refused stating that she has severe allergy to the adhesive.  Discussed benefits of continuous monitoring with patient and patient verbalizes understanding and declines monitoring.  Will continue to monitor patient.

## 2019-07-11 NOTE — ASSESSMENT & PLAN NOTE
History of Candida infections with initial transplant previously treated with micafungin in 05/2018. Repeat BAL on 6/23 with Candida glabrata. Continue micafungin per ID recs.

## 2019-07-11 NOTE — ASSESSMENT & PLAN NOTE
"The patient is a 29 year old lady with a past medical history of Cystic Fibrosis, s/p second lung transplant in June 2019, approximately three weeks ago, who has been recovering since that time here in the hospital, initially in the ICU and now in a step down unit. Psychiatry was consulted due to history of anxiety. The patient has an established history with outpatient psychiatry and recently started seeing Dr. Amador at [facility] in February 2019, at which time she moved from [place]. Main support system consists of common law  who is MPOA and primary caretaker.  was present at the bedside and provided much of the history. Current psych meds include: Seroquel 50 mg qhs, Venlafaxine 187.5 mg daily, and Ativan 2 mg q8h PRN.     Seroquel is too sedating for the patient after her recovery, and the Venlafaxine has not been doing much according to her. Ativan helps at times, but O2 sats are low currently, and the patient is not currently getting it due to excessive sedation due to trouble with recovery. Patient would also like a medication that would help with sleep without "making me a zombie." She feels that better sleep may help with daytime anxiety symptoms. She would also like a separate agent for anxiety, however. Patient would benefit from Ramelteon as a non-sedating and non habit forming sleep aid. She would also benefit from continued Ativan when she has better O2 saturations.     Recommendations:   - Continue Ativan for anxiety but reduce to 1 mg q8h PRN, to be given only with O2 sat > 95, when normotensive (SBP > 110) and not concurrently with opioids.   - Continue Venlafaxine 187.5 mg daily for depressed mood   - Start Ramelteon 8 mg qhs for insomnia   - Discontinue Seroquel due to excessive sediation    Thank you for this interesting consult. We will continue to follow this patient.   "

## 2019-07-11 NOTE — SUBJECTIVE & OBJECTIVE
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 -- Provider    Cystic fibrosis of the lung -- -- Provider    Ear infection -- -- Provider    Hypertension -- -- Provider    Migraine headache -- -- Provider    MRSA (methicillin resistant Staphylococcus aureus) carrier -- -- Provider    Osteopenia -- -- Provider    Other specified disease of pancreas -- -- Provider    Pain disorder -- -- Provider    Seizures 2013 -- Provider    Sinusitis, chronic -- -- Provider    Vocal cord paralysis 06/2014 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 Provider                  Surgical as of 7/11/2019     Past Surgical History     Procedure Laterality Date Comments Source    PORTACATH PLACEMENT Right -- rt sc Provider    LUNG TRANSPLANT Bilateral 6/12/14 -- Provider    ABDOMINAL SURGERY -- -- peg tube  Provider    ENDOMETRIAL ABLATION -- 2015 [facility] Provider    CHOLECYSTECTOMY -- 2016 -- Provider    SALPINGECTOMY Bilateral 2015 [facility] Provider    LARYNX SURGERY -- 2016 -- Provider    FESS [Other] -- -- -- Provider    MYRINGOTOMY W/ TUBES Right 04/2017 -- Provider    BRONCHOSCOPY N/A 5/29/2018 Procedure: BRONCHOSCOPY; Bronchoscopy with BAL and transbronchial biopsies under general anesthesia;  Surgeon: Jake Alvarez MD;  Location: [address];  Service: Transplant;  Laterality: N/A; Provider    BRONCHOSCOPY N/A 10/1/2018 Procedure: BRONCHOSCOPY;  Surgeon: Jake Alvarez MD;  Location: [address];  Service: Transplant;  Laterality: N/A; Provider    BRONCHOSCOPY Bilateral 12/11/2018 Procedure: BRONCHOSCOPY;  Surgeon: Francisca Morin DO;  Location: [address];  Service: Endoscopy;  Laterality: Bilateral; Provider    RIGHT HEART CATHETERIZATION N/A 1/22/2019 Procedure: RHC for lung re-transplant;  Surgeon: Laura Rachel MD;  Location: [facility] CATH LAB;  Service:  "Cardiology;  Laterality: N/A; Provider    HYSTERECTOMY -- 04/2017 [facility] Provider    FUNCTIONAL ENDOSCOPIC SINUS SURGERY (FESS) USING COMPUTER-ASSISTED NAVIGATION N/A 4/9/2019 Procedure: FESS, USING COMPUTER-ASSISTED NAVIGATION;  Surgeon: Adin Burks III, MD;  [facility];  Service: ENT;  Laterality: N/A; Provider    MAXILLARY ANTROSTOMY -- 4/9/2019 Procedure: MAXILLARY ANTROSTOMY;  Surgeon: Adin Burks III, MD;  [facility];  Service: ENT;; Provider    ETHMOIDECTOMY Bilateral 4/9/2019 Procedure: ETHMOIDECTOMY;  Surgeon: Adin Burks III, MD;  [facility];  Service: ENT;  Laterality: Bilateral; Provider    SPHENOIDECTOMY Bilateral 4/9/2019 Procedure: SPHENOIDECTOMY;  Surgeon: Adin Burks III, MD;  [facility];  Service: ENT;  Laterality: Bilateral; Provider    COLONOSCOPY N/A 5/3/2019 Procedure: COLONOSCOPY;  Surgeon: Juancho Rich MD;  [facility]  Service: Endoscopy;  Laterality: N/A;  s/p lung transplant with CARLOS EDUARDO; evaluation for re-transplantation; history of cystic fibrosis.  Recommendation for any post-lung transplant patient with history of cystic fibrosis to have colonoscopy at age 29.     reports "difficult to sedate"        has [facility] Provider    LUNG TRANSPLANT Bilateral 6/18/2019 Procedure: REDO BILATERAL LUNG TRANSPLANT; CLAMSHELL;  Surgeon: Jonathan Newby MD;  [facility];  Service: Cardiovascular;  Laterality: Bilateral; Provider    BRONCHOSCOPY N/A 7/5/2019 Procedure: Bronchoscopy;  Surgeon: Francisca Morin DO;  [facility];  Service: Endoscopy;  Laterality: N/A; Provider                  Family as of 7/11/2019     Problem Relation Name Age of Onset Comments Source    Thrombocytopenia Maternal Grandfather -- -- -- Provider    Drug abuse Maternal Grandfather -- -- -- Provider    Diabetes Maternal Grandfather -- -- -- Provider    Hypertension Maternal Grandmother -- -- -- " Provider    Cancer Maternal Grandmother -- -- pancreatic cancer Provider    Breast cancer Neg Hx -- -- -- Provider    Colon cancer Neg Hx -- -- -- Provider    Ovarian cancer Neg Hx -- -- -- Provider    Cirrhosis Neg Hx -- -- -- Provider            Tobacco Use as of 7/11/2019     Smoking Status Smoking Start Date Smoking Quit Date Packs/Day Years Used    Never Smoker -- -- -- --    Types Comments Smokeless Tobacco Status Smokeless Tobacco Quit Date Source    -- -- Never Used -- Provider            Alcohol Use as of 7/11/2019     Alcohol Use Drinks/Week Alcohol/Week Comments Source    No -- -- Has not had an alcoholic drink in more than 2 months. Provider    Frequency Standard Drinks Binge Drinking        -- -- --              Drug Use as of 7/11/2019     Drug Use Types Frequency Comments Source    No -- -- -- Provider            Sexual Activity as of 7/11/2019     Sexually Active Birth Control Partners Comments Source    Yes See Surgical Hx Male current partner since Oct 2016 Provider            Activities of Daily Living as of 7/11/2019    None           Social Documentation as of 7/11/2019    None           Occupational as of 7/11/2019    None           Socioeconomic as of 7/11/2019     Marital Status Spouse Name Number of Children Years Education Education Level Preferred Language Ethnicity Race Source    Single -- -- -- -- English /White White --    Financial Resource Strain Food Insecurity: Worry Food Insecurity: Inability Transportation Needs: Medical Transportation Needs: Non-medical    -- -- -- -- --            Pertinent History     Question Response Comments    Lives with -- --    Place in Birth Order -- --    Lives in -- --    Number of Siblings -- --    Raised by -- --    Legal Involvement -- --    Childhood Trauma -- --    Criminal History of -- --    Financial Status -- --    Highest Level of Education -- --    Does patient have access to a firearm? -- --     Service -- --    Primary  Leisure Activity -- --    Spirituality -- --        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    Cystic fibrosis of the lung     Ear infection     Hypertension     Migraine headache     MRSA (methicillin resistant Staphylococcus aureus) carrier     Osteopenia     Other specified disease of pancreas     Pain disorder     Seizures 2013    Sinusitis, chronic     Vocal cord paralysis 06/2014    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     Past Surgical History:   Procedure Laterality Date    ABDOMINAL SURGERY      peg tube     LXUHXQYS-EHYOVBREMQX-[id number] N/A 5/19/2015    Performed by [facility]y Dias [facility] at [facility] OR    BIOPSY-BRONCHUS N/A 12/20/2016    Performed by Jake [facility]varez MD at [facility]    Bronchoscopy N/A 7/5/2019    Performed by [facility]isca Morin DO at [facility]    BRONCHOSCOPY Bilateral 12/11/2018    Performed by [facility]isc[facility]in DO at [facility]    BRONCHOSCOPY N/A 10/1/2018    Performed by [facility]varez MD at [facility]    BRONCHOSCOPY Bilateral 12/20/2016    Performed by [facility] MD at [facility]    BRONCHOSCOPY - flexible bronchoscopy with probable tissue biopsy CPT 24441 N/A 7/21/2015    Performed by [facility]varez MD at [facility]    BRONCHOSCOPY - flexible bronchoscopy with probable tissue biospy CPT 35811 N/A 10/20/2015    Performed by [facility]varez MD at [facility]    BRONCHOSCOPY - flexible bronchoscopy with tissue biopsy CPT 20833 N/A 9/29/2015    Performed by [facility] MD at [facility]    BRONCHOSCOPY - flexible bronchoscopy with tissue biopsy CPT 48591 N/A 5/26/2015    Performed by [facility] MD at [facility]    BRONCHOSCOPY flexible bronchoscopy with tissue biopsy N/A 9/8/2014    Performed by Jake [facility]varez MD at [facility]    BRONCHOSCOPY flexible with possible tissue biopsy N/A 8/8/2014     [facility]formed by [facility] at [facility]    BRONCHOSCOPY, BAL, BIOPSIES N/A 3/20/2018    [facility]    BRONCHOSCOPY-FIBEROPTIC N/A 1/29/2016    Performed by Joann Cifuentes DO at [facility]    BRONCHOSCOPY; Bronchoscopy with BAL and transbronchial biopsies under general anesthesia N/A 5/29/2018    [facility]    CHOLECYSTECTOMY  2016    CHOLECYSTECTOMY-LAPAROSCOPIC N/A 7/22/2016    [facility]    COLONOSCOPY N/A 5/3/2019    Performed by Juancho Rich MD at [facility] ENDO ([facility])    ENDOMETRIAL ABLATION  2015    [facility]    ETHMOIDECTOMY Bilateral 4/9/2019    [facility]    FESS      FESS Bilateral 10/21/2013    Performed by Jared Whatley MD at [facility] OR [address] [place] [address]    FESS, USING COMPUTER-ASSISTED NAVIGATION N/A 4/9/2019    Performed by Adin Burks III, MD at [facility] OR [address] [place] [address]    FINE NEEDLE ASPIRATION (FNA)  of a cervical lymphadenopathy  1/29/2016    Performed by Joann Cifuentes DO at [facility] OR [address] [place] [address]    flex bronchoscopy with probable tissue biopsy N/A 7/31/2014    Performed by [facility] Diagnostic Provider at [facility] OR [address] [place] [address]    flexible bronchoscopy with tissue biopsy N/A 12/11/2014    Performed by Jake Alvarez MD at [facility] OR [address] [place] [address]    HYSTERECTOMY  04/2017    [facility]    INJECTION-VOCAL CORD Left 6/24/2014    Performed by Jared Whatley MD at [facility] OR [address] [place] [address]    KUNJJRNVZ-MIFE-D-CATH to right chest and removal of portacath to left chests wall. Bilateral 6/24/2014    Performed by Zhen Dorado MD at [facility] OR [address] [place] [address]    LARYNX SURGERY  2016    LUNG TRANSPLANT Bilateral 6/12/14    MAXILLARY ANTROSTOMY  4/9/2019    [facility]    MYRINGOTOMY W/ TUBES Right 04/2017    MYRINGOTOMY WITH INSERTION OF PE TUBES Right 4/15/2017    Performed by Gary Null MD at  [facility]    PLACEMENT-TUBE-PEG  5/15/2014    Performed by David Moses MD at [facility]    PORTACATH PLACEMENT Right     rt sc    REDO BILATERAL LUNG TRANSPLANT; CLAMSHELL Bilateral 6/18/2019    Performed by Jonathan Newby MD at [facility]    REMOVAL-TUBE-PEG  5/15/2014    Performed by David Moses MD at [facility]    RHC for lung re-transplant N/A 1/22/2019    Performed by Laura Rachel MD at [facility] CATH LAB    ROBOTIC ASSISTED LAPAROSCOPIC HYSTERECTOMY N/A 4/25/2017    Performed by [facility]y Dias Jr[facility] MD at [facility] OR    SALPINGECTOMY Bilateral 2015    [facility]    SALPINGECTOMY-LAPAROSCOPIC Bilateral 5/19/2015    Performed by Deny Dias Jr., MD at [facility] OR    SINUS SURGERY FUNCTIONAL ENDOSCOPIC WITH NAVIGATION Bilateral 4/3/2017    [facility]    SINUS SURGERY FUNCTIONAL ENDOSCOPIC WITH NAVIGATION, 16297, 38764, 17593, 44247 Bilateral 2/5/2015    Performed by Cesar Olsen MD at [facility]    SPHENOIDECTOMY Bilateral 4/9/2019    [facility]    THYROPLASTY - Medialization laryngoplasty, cricothyroid subluxation, arytenoid repositioning Left 8/2/2016    Performed by Gary Null MD at [facility]    TRANSPLANT-LUNG Bilateral 6/11/2014    Performed by Adolfo Huston MD at [facility] OR [address] [place] [address]     Family History     Problem Relation (Age of Onset)    Cancer Maternal Grandmother    Diabetes Maternal Grandfather    Drug abuse Maternal Grandfather    Hypertension Maternal Grandmother    Thrombocytopenia Maternal Grandfather        Tobacco Use    Smoking status: Never Smoker    Smokeless tobacco: Never Used   Substance and Sexual Activity    Alcohol use: No     Comment: Has not had an alcoholic drink in more than 2 months.    Drug use: No    Sexual activity: Yes     Partners: Male     Birth control/protection: See Surgical Hx     Comment: current partner since Oct  2016     Review of patient's allergies indicates:   Allergen Reactions    Albuterol Palpitations    Colistin Anaphylaxis    Vancomycin analogues      Infusion reaction that does not resolve with slowing  Pt. States she can tolerate it when given with 50 mg Benadryl and ran over 3 hours    Neupogen [filgrastim] Other (See Comments)     Ostealgia after five daily doses of 300 mcg.      Bactrim [sulfamethoxazole-trimethoprim] Hives    Ceftazidime Hives     Pt stated can tolerate cefapine not ceftazidime    Ceftazidime     Dronabinol Other (See Comments)     Mental changes/hallucinations    Haldol [haloperidol lactate] Other (See Comments)     Seizure like activity    Nsaids (non-steroidal anti-inflammatory drug)      Cannot have due to lung transplant    Adhesive Rash     Cloth tape- please use tegaderm or paper tape    Aztreonam Rash    Ciprofloxacin Nausea And Vomiting     Projectile N/V, per patient.  Unwilling to retry therapy.       No current facility-administered medications on file prior to encounter.      Current Outpatient Medications on File Prior to Encounter   Medication Sig    metoprolol tartrate (LOPRESSOR) 25 MG tablet Take 1 tablet (25 mg total) by mouth 2 (two) times daily.    amLODIPine (NORVASC) 10 MG tablet Take 1 tablet (10 mg total) by mouth once daily.    butalbital-acetaminophen-caffeine -40 mg (FIORICET, ESGIC) -40 mg per tablet Take 1 tablet by mouth every 6 (six) hours as needed for Pain.    calcium-vitamin D3 (OS-KATY 500 + D3) 500 mg(1,250mg) -200 unit per tablet Take 1 tablet by mouth 2 (two) times daily with meals.    dapsone 100 MG Tab Take 1 tablet (100 mg total) by mouth once daily.    diphenhydrAMINE (BENADRYL) 50 MG tablet Take 1 tablet (50 mg total) by mouth every 6 (six) hours as needed for Itching.    fexofenadine (ALLEGRA) 180 MG tablet Take 180 mg by mouth once daily.    fluconazole (DIFLUCAN) 150 MG Tab TAKE 1 TABLET BY MOUTH EVERY WEEK     fluticasone propionate (FLONASE) 50 mcg/actuation nasal spray INSERT 2 SPRAYS IN EACH NOSTRIL DAILY    folic acid (FOLVITE) 1 MG tablet Take 1 tablet (1,000 mcg total) by mouth once daily.    gabapentin (NEURONTIN) 300 MG capsule Take 1 capsule (300 mg total) by mouth 3 (three) times daily.    inhalation spacing device (PROCHAMBER) USE AS DIRECTED    levalbuterol (XOPENEX HFA) 45 mcg/actuation inhaler Inhale 1-2 puffs into the lungs.    levalbuterol (XOPENEX) 1.25 mg/3 mL nebulizer solution Take 3 mLs (1.25 mg total) by nebulization every 8 (eight) hours as needed for Wheezing or Shortness of Breath. Rescue    lipase-protease-amylase (CREON) 36,000-114,000- 180,000 unit CpDR Take 4 capsules by mouth 3 (three) times daily with meals. Take 3 with snacks as needed. (Patient taking differently: Take 5 capsules by mouth 3 (three) times daily with meals. )    LORazepam (ATIVAN) 2 MG Tab Take 1 tablet by mouth every 8 hours as needed for ANXIETY    magnesium oxide (MAG-OX) 400 mg (241.3 mg magnesium) tablet Take 1 tablet (400 mg total) by mouth 2 (two) times daily.    mometasone-formoterol (DULERA) 100-5 mcg/actuation HFAA Inhale 1 puff into the lungs 2 (two) times daily. Controller    montelukast (SINGULAIR) 10 mg tablet Take 1 tablet (10 mg total) by mouth once daily.    morphine (MS CONTIN) 15 MG 12 hr tablet Take 1 tablet by mouth twice a day    multivit,min52-folic-vitK-cQ10 (AQUADEKS) 100-700-10 mcg-mcg-mg Cap cap 1 tab twice daily    multivitamin-minerals no.55 (CENTRUM FLAVOR BURST ADULT) Chew     mycophenolate (CELLCEPT) 250 mg Cap Take 2 capsules (500 mg total) by mouth 2 (two) times daily.    omeprazole (PRILOSEC) 40 MG capsule Take 40 mg by mouth every morning.    ondansetron (ZOFRAN) 8 MG tablet Take 1 tablet (8 mg total) by mouth every 12 (twelve) hours as needed for Nausea.    oxycodone (ROXICODONE) 5 MG immediate release tablet Take 10 mg by mouth every 4 (four) hours as needed for Pain.     oxyCODONE (ROXICODONE) 5 MG immediate release tablet take 1 tablet by mouth twice a day    polyethylene glycol (GLYCOLAX) 17 gram PwPk Take 17 g by mouth 2 (two) times daily.    predniSONE (DELTASONE) 5 MG tablet Take 1 tablet (5 mg total) by mouth once daily.    promethazine (PHENERGAN) 25 MG tablet Take 1 tablet (25 mg total) by mouth every 6 (six) hours as needed for Nausea.    QUEtiapine (SEROQUEL) 25 MG Tab Take 1 tablet by mouth in morning and 2 tablets at bedtime    sodium chloride 3% 3 % nebulizer solution Take 4 mLs by nebulization once daily.    tacrolimus (PROGRAF) 0.5 MG Cap Take 1 capsule (0.5 mg total) by mouth every 12 (twelve) hours.    tacrolimus (PROGRAF) 1 MG Cap Take 2 capsules (2 mg total) by mouth every 12 (twelve) hours. Daily doses: 2.5 mg twice daily.    tiZANidine (ZANAFLEX) 4 MG tablet TAKE 2 TABLETS BY MOUTH EVERY 6 HOURS AS NEEDED    tobramycin with nebulizer 300 mg/5 mL Nebu Take 300 mg by nebulization every 12 (twelve) hours.    topiramate (TOPAMAX) 25 MG tablet Take 1 tablet (25 mg total) by mouth 2 (two) times daily    venlafaxine (EFFEXOR-XR) 150 MG Cp24 Take 1 capsule (150 mg total) by mouth once daily.    venlafaxine (EFFEXOR-XR) 37.5 MG 24 hr capsule Take 1 capsule (37.5 mg total) by mouth once daily.     Psychotherapeutics (From admission, onward)    Start     Stop Route Frequency Ordered    07/07/19 2100  venlafaxine 24 hr capsule 37.5 mg      -- Oral Nightly 07/07/19 1706    07/07/19 2100  venlafaxine 24 hr capsule 150 mg      -- Oral Nightly 07/07/19 1706    07/07/19 1051  LORazepam tablet 2 mg      -- Oral Every 8 hours PRN 07/07/19 0951    07/01/19 2100  QUEtiapine tablet 50 mg      -- Oral Nightly 07/01/19 1148        Review of Systems  Strengths and Liabilities: Strength: Patient is intelligent., Strength: Patient has positive support network., Strength: Patient has reasonable judgment.    Objective:     Vital Signs (Most Recent):  Temp: 98.8 °F (37.1 °C)  "(07/11/19 1137)  Pulse: 93 (07/11/19 1137)  Resp: 17 (07/11/19 1137)  BP: (!) 134/91 (07/11/19 1137)  SpO2: (!) 92 % (07/11/19 1137) Vital Signs (24h Range):  Temp:  [98 °F (36.7 °C)-100.1 °F (37.8 °C)] 98.8 °F (37.1 °C)  Pulse:  [70-93] 93  Resp:  [15-18] 17  SpO2:  [90 %-96 %] 92 %  BP: (132-160)/() 134/91     Height: 5' 1" (154.9 cm)  Weight: 59.4 kg (131 lb)  Body mass index is 24.75 kg/m².      Intake/Output Summary (Last 24 hours) at 7/11/2019 1441  Last data filed at 7/11/2019 1358  Gross per 24 hour   Intake 600 ml   Output 2200 ml   Net -1600 ml       Physical Exam   Psychiatric:   Mental Status Exam:  Appearance: older than stated age, malnourished, thin & gaunt looking  Behavior/Cooperation: reluctant to participate, psychomotor retardation, eye contact minimal  Speech: slowed, soft  Mood: anxious and depressed  Affect: anxious and constricted  Thought Process: normal and logical  Thought Content: normal, no suicidality, no homicidality, delusions, or paranoia   Orientation: grossly intact  Memory: Grossly intact  Attention Span/Concentration: Normal  Insight: fair  Judgment: fair        Significant Labs: All pertinent labs within the past 24 hours have been reviewed.    Significant Imaging: I have reviewed all pertinent imaging results/findings within the past 24 hours.  "

## 2019-07-11 NOTE — SUBJECTIVE & OBJECTIVE
Interval History: More oriented today again. Afebrile. Tolerating zerbaxa well still. Some loose stools on antibiotics.    Review of Systems   Constitutional: Positive for appetite change and fatigue. Negative for chills, diaphoresis and fever.   HENT: Negative for congestion, postnasal drip, rhinorrhea, sinus pressure and sinus pain.    Respiratory: Positive for cough and shortness of breath. Negative for wheezing.    Cardiovascular: Negative for chest pain (incision site), palpitations and leg swelling.   Gastrointestinal: Negative for abdominal distention, abdominal pain, constipation, diarrhea, nausea and vomiting.   Genitourinary: Negative for decreased urine volume, difficulty urinating, dysuria and urgency.   Musculoskeletal: Positive for back pain (chronic). Negative for neck pain and neck stiffness.   Skin: Positive for pallor. Negative for rash and wound.   Allergic/Immunologic: Positive for immunocompromised state.   Neurological: Positive for weakness. Negative for dizziness, seizures, syncope and headaches.   Psychiatric/Behavioral: Negative for agitation, confusion and decreased concentration. The patient is nervous/anxious.      Objective:     Vital Signs (Most Recent):  Temp: 99.3 °F (37.4 °C) (07/11/19 1640)  Pulse: 83 (07/11/19 1640)  Resp: 15 (07/11/19 1640)  BP: (!) 146/109 (07/11/19 1640)  SpO2: (!) 94 % (07/11/19 1640) Vital Signs (24h Range):  Temp:  [98.8 °F (37.1 °C)-100.1 °F (37.8 °C)] 99.3 °F (37.4 °C)  Pulse:  [70-93] 83  Resp:  [15-18] 15  SpO2:  [90 %-97 %] 94 %  BP: (134-160)/() 146/109     Weight: 59.4 kg (131 lb)  Body mass index is 24.75 kg/m².    Estimated Creatinine Clearance: 68 mL/min (based on SCr of 1 mg/dL).    Physical Exam   Constitutional: She is oriented to person, place, and time. She appears well-developed and well-nourished. She is easily aroused. No distress.   HENT:   Head: Normocephalic and atraumatic.   Right Ear: External ear normal.   Left Ear: External ear  normal.   Nose: Nose normal.   Eyes: Conjunctivae and EOM are normal. No scleral icterus.   Neck: Normal range of motion. Neck supple. No JVD present.   Cardiovascular: Normal rate, regular rhythm and normal heart sounds. Exam reveals no friction rub.   No murmur heard.  Pulmonary/Chest: Effort normal. No accessory muscle usage. No respiratory distress. She has decreased breath sounds in the right lower field and the left lower field. She has no wheezes. She has no rhonchi. She has no rales.   Abdominal: Soft. Bowel sounds are normal. She exhibits no distension. There is no tenderness.   Musculoskeletal: Normal range of motion. She exhibits no edema.   Neurological: She is alert, oriented to person, place, and time and easily aroused.   Skin: Skin is warm and dry. She is not diaphoretic. No erythema.   Psychiatric: Her speech is normal. Her mood appears not anxious.   Nursing note and vitals reviewed.      Significant Labs:   CBC:   Recent Labs   Lab 07/10/19  0510 07/11/19  0500   WBC 4.34 5.97   HGB 8.6* 9.7*   HCT 28.7* 32.0*   * 140*     CMP:   Recent Labs   Lab 07/10/19  0510 07/11/19  0500    137   K 4.1 4.2    104   CO2 22* 24   * 87   BUN 14 13   CREATININE 1.1 1.0   CALCIUM 7.9* 8.5*   PROT 5.1* 5.8*   ALBUMIN 2.4* 2.7*   BILITOT 0.3 0.4   ALKPHOS 118 153*   AST 14 20   ALT 19 23   ANIONGAP 8 9   EGFRNONAA >60.0 >60.0       Significant Imaging: I have reviewed all pertinent imaging results/findings within the past 24 hours.

## 2019-07-11 NOTE — CONSULTS
"[facility]  Psychiatry  Consult Note    Patient Name: Juanita Ibarra  MRN: [id number]   Code Status: Full Code  Admission Date: 6/17/2019  Hospital Length of Stay: 23 days  Attending Physician: Jake Alvarez MD  Primary Care Provider: Primary Doctor No    Current Legal Status: N/A    Patient information was obtained from patient and past medical records.   Consults  Subjective:     Principal Problem:S/P lung transplant    Chief Complaint: Anxiety      HPI:   Juanita Ibarra is a 29 y.o. female with a past psychiatric history of anxiety who presented to [facility] due to S/P lung transplant. Psychiatry was consulted for medication management of anxiety.     Per Primary Team:  The patient has been hospitalized since 6/17/19 for her second lung transplant. She successfully underwent the procedure about three weeks ago and has been recovering, previously in the ICU and now in a step down unit. She had her first lung transplant in 2014. She has a history of cystic fibrosis. Long term history of anxiety and depression. Consult to psychiatry was for med management of anxiety.     Currently, the patient is on broad spectrum antibiotics, antivirals and antifungals, as well as immunosuppressants and opioids for pain control. According to RN, the patient has periods where she seems "back to her baseline" but also times when she seems very lethargic. Patient is on Ativan PRN for anxiety but has not been given to her due to concerns for sedation. According to Jacqueline who is mid-level taking care of the patient, the patient is allowed to have the Ativan PRN and it is not considered contraindicated by her physicians after the lung transplant.     Current psych meds: Ativan 2 mg q8h PRN, Seroquel 50 mg qhs (not getting due to excess sedation), Venlafaxine 187.5 mg/d.     Per Psychiatry:  The patient was seen at the bedside with common-law  present. They are not legally , but  " is primary caretaker as well as MPOA.  answered much of the history because patient was excessively tired and mainly only responded to yes or no questions. Patient has been seeing Dr. Amador in outpatient psychiatry here since February, at which time they moved here from [place], [place].     Dr. Amador has tried patient on Seroquel as an outpatient which seemed to be effective in controlling anxiety, but here in the hospital she has not been getting the medication because it makes her too tired. Has also tried the following for her symptoms: Hydroxyzine (sometimes too sedating, sometimes too activating), Venlafaxine (currently taking), Buspirone (too activating. Did not sleep well for four days), Klonopin (did not like it.. Took it earlier in her 20s.. Nonspecific as to why it was not preferred).     Main symptoms are insomnia and anxiety. Cannot describe what she is anxious about but just feels an overall uneasiness in her life, on a daily basis. Not related to having trouble breathing. Insomnia is described as wanting to sleep late and get up late. At home, her schedule is to sleep 2-3 am and wake up at 11 am. This is hard in the hospital. Stated that she has been doing this all her life. Wanted to try a med that would help her sleep and another regimen for anxiety. Ativan seems to help.     Denies manic symptoms and no psychotic symptoms. Denies history of suicidal/ homicidal thinking. No suicide attempts. No history of illicit drug use and no alcohol/tobacco use.     Collateral:   None obtained     Medical Review of Systems:  Pertinent items are noted in HPI.    Psychiatric Review of Systems-is patient experiencing or having changes in  sleep: yes  appetite: yes  weight: no  energy/anergy: yes  interest/pleasure/anhedonia: no  somatic symptoms: no  libido: no  anxiety/panic: yes  guilty/hopelessness: no  concentration: no  S.I.B.s/risky behavior: no  any drugs: no  alcohol: no      Allergies:  Albuterol; Colistin; Vancomycin analogues; Neupogen [filgrastim]; Bactrim [sulfamethoxazole-trimethoprim]; Ceftazidime; Ceftazidime; Dronabinol; Haldol [haloperidol lactate]; Nsaids (non-steroidal anti-inflammatory drug); Adhesive; Aztreonam; and Ciprofloxacin    Past Medical/Surgical History:  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    Cystic fibrosis of the lung     Ear infection     Hypertension     Migraine headache     MRSA (methicillin resistant Staphylococcus aureus) carrier     Osteopenia     Other specified disease of pancreas     Pain disorder     Seizures 2013    Sinusitis, chronic     Vocal cord paralysis 06/2014    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     Past Surgical History:   Procedure Laterality Date    ABDOMINAL SURGERY      peg tube     LBCABGEA-[id number]-YMHGCRSKROYQ N/A 5/19/2015    Performed by [facility]y Dias [facility] at [facility] OR    BIOPSY-BRONCHUS N/A 12/20/2016    Performed by Jake Alvarez MD at [facility] OR 2ND [facility]    Bronchoscopy N/A 7/5/2019    Performed by Francisca Morin DO at [facility] OR [facility]    BRONCHOSCOPY Bilateral 12/11/2018    Performed by Franicsca Morin DO at [facility] OR [facility]    BRONCHOSCOPY N/A 10/1/2018    Performed by Jake [facility]varez MD at [facility] OR [facility]    BRONCHOSCOPY Bilateral 12/20/2016    Performed by Jake [facility]varez MD at [facility] OR [facility]    BRONCHOSCOPY - flexible bronchoscopy with probable tissue biopsy CPT 89376 N/A 7/21/2015    Performed by Jake [facility]varez MD at [facility] OR [facility]    BRONCHOSCOPY - flexible bronchoscopy with probable tissue biospy CPT 80684 N/A 10/20/2015    Performed by Jake [facility]varez MD at [facility] OR [facility]    BRONCHOSCOPY - flexible bronchoscopy with tissue biopsy CPT 45333 N/A 9/29/2015    Performed by Jake [facility]varez MD at [facility] OR [facility]    BRONCHOSCOPY - flexible bronchoscopy with tissue biopsy CPT 54977 N/A 5/26/2015     Performed by Jake [facility] MD at [facility] OR [facility]    BRONCHOSCOPY flexible bronchoscopy with tissue biopsy N/A 9/8/2014    Performed by Jake Alvarez MD at [facility] OR [facility]    BRONCHOSCOPY flexible with possible tissue biopsy N/A 8/8/2014    Performed by Jake Alvarez MD at [facility] OR [facility]    BRONCHOSCOPY, BAL, BIOPSIES N/A 3/20/2018    Performed by Jake Alvarez MD at [facility] OR [facility]    BRONCHOSCOPY-FIBEROPTIC N/A 1/29/2016    Performed by Joann Cifuentes DO at [facility] OR [facility]    BRONCHOSCOPY; Bronchoscopy with BAL and transbronchial biopsies under general anesthesia N/A 5/29/2018    [facility]    CHOLECYSTECTOMY  2016    CHOLECYSTECTOMY-LAPAROSCOPIC N/A 7/22/2016    Performed by Joshua Goldberg, MD at [facility] OR [facility]    COLONOSCOPY N/A 5/3/2019    Performed by Juancho Rich MD at [facility] ENDO (2ND [facility])    ENDOMETRIAL ABLATION  2015    [facility]    ETHMOIDECTOMY Bilateral 4/9/2019    Performed by Adin Burks III, MD at [facility] OR [facility]    FESS      FESS Bilateral 10/21/2013    Performed by Jared Whatley MD at [facility] OR [facility]    FESS, USING COMPUTER-ASSISTED NAVIGATION N/A 4/9/2019    Performed by Adin Burks III, MD at [facility] OR 2[facility]    FINE NEEDLE ASPIRATION (FNA)  of a cervical lymphadenopathy  1/29/2016    Performed by Joann Cifuentes DO at [facility] OR [facility]    flex bronchoscopy with probable tissue biopsy N/A 7/31/2014    Performed by [facility] Diagnostic Provider at [facility] OR 2ND [facility]    flexible bronchoscopy with tissue biopsy N/A 12/11/2014    Performed by Jake Alvarez MD at [facility] OR [facility]    HYSTERECTOMY  04/2017    [facility]    INJECTION-VOCAL CORD Left 6/24/2014    Performed by Jared Whatley MD at [facility] OR [facility]    PHOCWETBA-YEIW-Z-CATH to right chest and removal of portacath to left chests wall. Bilateral 6/24/2014    Performed by Zhen Dorado MD at [facility] OR [facility]    LARYNX SURGERY  2016    LUNG TRANSPLANT  Bilateral 6/12/14    MAXILLARY ANTROSTOMY  4/9/2019    Performed by Adin Burks III, MD at [facility] OR [address] [place] [address]    MYRINGOTOMY W/ TUBES Right 04/2017    MYRINGOTOMY WITH INSERTION OF PE TUBES Right 4/15/2017    Performed by Gary Null [facility]    PLACEMENT-TUBE-PEG  5/15/2014    Performed by David Moses MD at [facility] OR [address] [place] [address]    PORTACATH PLACEMENT Right     rt sc    REDO BILATERAL LUNG TRANSPLANT; CLAMSHELL Bilateral 6/18/2019    Performed by Jonathan Newby MD at [facility] OR [address] [place] [address]    REMOVAL-TUBE-PEG  5/15/2014    Performed by David Moses MD at [facility] OR [address] [place] [address]    RHC for lung re-transplant N/A 1/22/2019    Performed by Laura Rachel MD at [facility] CATH LAB    ROBOTIC ASSISTED LAPAROSCOPIC HYSTERECTOMY N/A 4/25/2017    Performed by Deny Dias Jr., MD at [facility] OR    SALPINGECTOMY Bilateral 2015    [facility]    SALPINGECTOMY-LAPAROSCOPIC Bilateral 5/19/2015    Performed by Deny Dias Jr., MD at [facility] OR    SINUS SURGERY FUNCTIONAL ENDOSCOPIC WITH NAVIGATION Bilateral 4/3/2017    Performed by Cesar Olsen MD at [facility] OR [address] [place] [address]    SINUS SURGERY FUNCTIONAL ENDOSCOPIC WITH NAVIGATION, 63264, 54033, 23803, 86943 Bilateral 2/5/2015    Performed by Cesar Olsen MD at [facility]    SPHENOIDECTOMY Bilateral 4/9/2019    Performed by Adin Burks III, MD at [facility] OR [address] [place] [address]    THYROPLASTY - Medialization laryngoplasty, cricothyroid subluxation, arytenoid repositioning Left 8/2/2016    Performed by Gary Null MD at [facility] OR [address] [place] [address]    TRANSPLANT-LUNG Bilateral 6/11/2014    Performed by Adolfo Huston MD at [facility] OR [address] [place] [address]       Past Psychiatric History:  Previous Medication Trials: yes   Previous Psychiatric Hospitalizations: no   Previous Suicide Attempts: no   History of Violence: no  Outpatient Psychiatrist: yes    Social History:  Marital Status:   Children: 0   Employment Status/Info: never employed   Education: some college  Special  Ed: no  Housing Status: lives with common law    History of phys/sexual abuse: unknown  Access to gun: no    Substance Abuse History:  Recreational Drugs: denies   Use of Alcohol: denied  Rehab History: no   Tobacco Use: no  Use of OTC: no     Legal History:  Past Charges/Incarcerations: no   Pending charges: no     Family Psychiatric History:   Unknown     Psychosocial Stressors: health  Functioning Relationships: good support system    Hospital Course: 7/11/2019 seen today for initial consult request. Patient appeared excessively tired and asked consultant to come at another time or to speak to her  who was sitting at the bedside.          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 -- Provider    Cystic fibrosis of the lung -- -- Provider    Ear infection -- -- Provider    Hypertension -- -- Provider    Migraine headache -- -- Provider    MRSA (methicillin resistant Staphylococcus aureus) carrier -- -- Provider    Osteopenia -- -- Provider    Other specified disease of pancreas -- -- Provider    Pain disorder -- -- Provider    Seizures 2013 -- Provider    Sinusitis, chronic -- -- Provider    Vocal cord paralysis 06/2014 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 Provider                  Surgical as of 7/11/2019     Past Surgical History     Procedure Laterality Date Comments Source    PORTACATH PLACEMENT Right -- rt sc Provider    LUNG TRANSPLANT Bilateral 6/12/14 -- Provider    ABDOMINAL SURGERY -- -- peg tube  Provider    ENDOMETRIAL ABLATION -- 2015 [facility] Provider    CHOLECYSTECTOMY -- 2016 -- Provider    SALPINGECTOMY Bilateral 2015 [facility] Provider    LARYNX SURGERY -- 2016 -- Provider    FESS [Other] -- -- -- Provider    MYRINGOTOMY W/ TUBES Right 04/2017 -- Provider    BRONCHOSCOPY N/A 5/29/2018 Procedure: BRONCHOSCOPY; Bronchoscopy with BAL and  "transbronchial biopsies under general anesthesia;  Surgeon: Jake Alvarez MD;  [facility];  Service: Transplant;  Laterality: N/A; Provider    BRONCHOSCOPY N/A 10/1/2018 Procedure: BRONCHOSCOPY;  Surgeon: Jake Alvarez MD;  Location: [facility] OR [facility] FLR;  Service: Transplant;  Laterality: N/A; Provider    BRONCHOSCOPY Bilateral 12/11/2018 Procedure: BRONCHOSCOPY;  Surgeon: Francisca Morin DO;  [facility];  Service: Endoscopy;  Laterality: Bilateral; Provider    RIGHT HEART CATHETERIZATION N/A 1/22/2019 Procedure: RHC for lung re-transplant;  Surgeon: Laura Rachel MD;  Location: [facility] CATH LAB;  Service: Cardiology;  Laterality: N/A; Provider    HYSTERECTOMY -- 04/2017 [facility] Provider    FUNCTIONAL ENDOSCOPIC SINUS SURGERY (FESS) USING COMPUTER-ASSISTED NAVIGATION N/A 4/9/2019 Procedure: FESS, USING COMPUTER-ASSISTED NAVIGATION;  Surgeon: Adin Burks III, MD;  Location: [facility] OR [facility] FLR;  Service: ENT;  Laterality: N/A; Provider    MAXILLARY ANTROSTOMY -- 4/9/2019 Procedure: MAXILLARY ANTROSTOMY;  Surgeon: Adin Burks III, MD;  [facility];  Service: ENT;; Provider    ETHMOIDECTOMY Bilateral 4/9/2019 Procedure: ETHMOIDECTOMY;  Surgeon: Adin Burks III, MD;  Location: [facility] OR 2ND [facility]LR;  Service: ENT;  Laterality: Bilateral; Provider    SPHENOIDECTOMY Bilateral 4/9/2019 Procedure: SPHENOIDECTOMY;  Surgeon: Adin Burks III, MD;  Location: [facility] OR [facility] FLR;  Service: ENT;  Laterality: Bilateral; Provider    COLONOSCOPY N/A 5/3/2019 Procedure: COLONOSCOPY;  Surgeon: Juancho Rich MD;  Location: [facility] ENDO (2ND FLR);  Service: Endoscopy;  Laterality: N/A;  s/p lung transplant with CARL[facility] EDUARDO; evaluation for re-transplantation; history of cystic fibrosis.  Recommendation for any post-lung transplant patient with history of cystic fibrosis to have colonoscopy at age 29.     reports "difficult to sedate"        has portacat Provider    LUNG TRANSPLANT " Bilateral 6/18/2019 Procedure: REDO BILATERAL LUNG TRANSPLANT; CLAMSHELL;  Surgeon: Jonathan Newby MD;  [facility];  Service: Cardiovascular;  Laterality: Bilateral; Provider    BRONCHOSCOPY N/A 7/5/2019 Procedure: Bronchoscopy;  Surgeon: Francisca Morin DO;  Location: [facility] OR 2ND FLR;  Service: Endoscopy;  Laterality: N/A; Provider                  Family as of 7/11/2019     Problem Relation Name Age of Onset Comments Source    Thrombocytopenia Maternal Grandfather -- -- -- Provider    Drug abuse Maternal Grandfather -- -- -- Provider    Diabetes Maternal Grandfather -- -- -- Provider    Hypertension Maternal Grandmother -- -- -- Provider    Cancer Maternal Grandmother -- -- pancreatic cancer Provider    Breast cancer Neg Hx -- -- -- Provider    Colon cancer Neg Hx -- -- -- Provider    Ovarian cancer Neg Hx -- -- -- Provider    Cirrhosis Neg Hx -- -- -- Provider            Tobacco Use as of 7/11/2019     Smoking Status Smoking Start Date Smoking Quit Date Packs/Day Years Used    Never Smoker -- -- -- --    Types Comments Smokeless Tobacco Status Smokeless Tobacco Quit Date Source    -- -- Never Used -- Provider            Alcohol Use as of 7/11/2019     Alcohol Use Drinks/Week Alcohol/Week Comments Source    No -- -- Has not had an alcoholic drink in more than 2 months. Provider    Frequency Standard Drinks Binge Drinking        -- -- --              Drug Use as of 7/11/2019     Drug Use Types Frequency Comments Source    No -- -- -- Provider            Sexual Activity as of 7/11/2019     Sexually Active Birth Control Partners Comments Source    Yes See Surgical Hx Male current partner since Oct 2016 Provider            Activities of Daily Living as of 7/11/2019    None           Social Documentation as of 7/11/2019    None           Occupational as of 7/11/2019    None           Socioeconomic as of 7/11/2019     Marital Status Spouse Name Number of Children Years Education Education Level  Preferred Language Ethnicity Race Source    Single -- -- -- -- English /White White --    Financial Resource Strain Food Insecurity: Worry Food Insecurity: Inability Transportation Needs: Medical Transportation Needs: Non-medical    -- -- -- -- --            Pertinent History     Question Response Comments    Lives with -- --    Place in Birth Order -- --    Lives in -- --    Number of Siblings -- --    Raised by -- --    Legal Involvement -- --    Childhood Trauma -- --    Criminal History of -- --    Financial Status -- --    Highest Level of Education -- --    Does patient have access to a firearm? 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    Cystic fibrosis of the lung     Ear infection     Hypertension     Migraine headache     MRSA (methicillin resistant Staphylococcus aureus) carrier     Osteopenia     Other specified disease of pancreas     Pain disorder     Seizures 2013    Sinusitis, chronic     Vocal cord paralysis 06/2014    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     Past Surgical History:   Procedure Laterality Date    ABDOMINAL SURGERY      peg tube     ABTLYVXG-YUTDOSCOOQB-YRNKOBCIWFBT N/A 5/19/2015    Performed by [facility]y Dias Jr[facility] MD at [facility] OR    BIOPSY-BRONCHUS N/A 12/20/2016    Performed by Jake [facility]varez MD at [facility] OR 2ND [facility]    Bronchoscopy N/A 7/5/2019    Performed by Francisca Morin DO at [facility] OR 2[facility]    BRONCHOSCOPY Bilateral 12/11/2018    Performed by Francisca Morin DO at [facility] OR 2[facility]    BRONCHOSCOPY N/A 10/1/2018    Performed by Jake Alvarez MD at [facility] OR 2[facility]    BRONCHOSCOPY Bilateral 12/20/2016    Performed by Jake Alvarez MD at [facility] OR 2[facility]    BRONCHOSCOPY - flexible bronchoscopy with probable tissue biopsy CPT 57003 N/A 7/21/2015    Performed by Jake  MD Antonio at [facility] OR 2[facility]    BRONCHOSCOPY - flexible bronchoscopy with probable tissue biospy CPT 06666 N/A 10/20/2015    Performed by Jake Alvarez MD at [facility] OR 2[facility]    BRONCHOSCOPY - flexible bronchoscopy with tissue biopsy CPT 45406 N/A 9/29/2015    Performed by Jake Alvarez MD at [facility] OR [facility]    BRONCHOSCOPY - flexible bronchoscopy with tissue biopsy CPT 69668 N/A 5/26/2015    Performed by Jake Alvarez MD at [facility] OR [facility]ST [facility]    BRONCHOSCOPY flexible bronchoscopy with tissue biopsy N/A 9/8/2014    Performed by Jake Alvarez MD at [facility] OR 2[facility]    BRONCHOSCOPY flexible with possible tissue biopsy N/A 8/8/2014    Performed by Jake Alvarez MD at [facility] OR 2[facility]    BRONCHOSCOPY, BAL, BIOPSIES N/A 3/20/2018    Performed by Jake Alvarez MD at [facility] OR [facility]    BRONCHOSCOPY-FIBEROPTIC N/A 1/29/2016    Performed by Joann Cifuentes DO at [facility] OR 2ND [facility]    BRONCHOSCOPY; Bronchoscopy with BAL and transbronchial biopsies under general anesthesia N/A 5/29/2018    Performed by Jake Alvarez MD at [facility] OR 2[facility]    CHOLECYSTECTOMY  2016    CHOLECYSTECTOMY-LAPAROSCOPIC N/A 7/22/2016    Performed by Joshua Goldberg, MD at [facility] OR 2[facility]    COLONOSCOPY N/A 5/3/2019    Performed by Juancho Rich MD at [facility] ENDO (2ND FLR)    ENDOMETRIAL ABLATION  2015    K[facility]    ETHMOIDECTOMY Bilateral 4/9/2019    Performed by Adin Burks III, MD at [facility] OR 2[facility]    FESS      FESS Bilateral 10/21/2013    Performed by Jared Whatley MD at [facility] OR [facility]    FESS, USING COMPUTER-ASSISTED NAVIGATION N/A 4/9/2019    Performed by Adin Burks III, MD at [facility] OR 2[facility]    FINE NEEDLE ASPIRATION (FNA)  of a cervical lymphadenopathy  1/29/2016    Performed by Joann Cifuentes DO at [facility] OR 2[facility]    flex bronchoscopy with probable tissue biopsy N/A 7/31/2014    Performed by [facility] Diagnostic Provider at [facility] OR 2ND [facility]    flexible bronchoscopy with tissue biopsy  N/A 12/11/2014    Performed by Jake Alvarez MD at [facility] OR 2ND [facility]    HYSTERECTOMY  04/2017    [facility]    INJECTION-VOCAL CORD Left 6/24/2014    [facility]    GOXXIXBSS-[id number]-C-CATH to right chest and removal of portacath to left chests wall. Bilateral 6/24/2014    [facility]    LARYNX SURGERY  2016    LUNG TRANSPLANT Bilateral 6/12/14    MAXILLARY ANTROSTOMY  4/9/2019    Performed [facility]    MYRINGOTOMY W/ TUBES Right 04/2017    MYRINGOTOMY WITH INSERTION OF PE TUBES Right 4/15/2017    Performed by Gary Null MD at [facility] OR [address] [place] [address]    PLACEMENT-TUBE-PEG  5/15/2014    [facility]    PORTACATH PLACEMENT Right     rt sc    REDO BILATERAL LUNG TRANSPLANT; CLAMSHELL Bilateral 6/18/2019    [facility]    REMOVAL-TUBE-PEG  5/15/2014    Performed by David Moses MD at [facility] OR [address] [place] [address]    RHC for lung re-transplant N/A 1/22/2019    Performed by Laura Rachel MD at [facility] CATH LAB    ROBOTIC ASSISTED LAPAROSCOPIC HYSTERECTOMY N/A 4/25/2017    Performed by Deny Dias Jr., MD at [facility] OR    SALPINGECTOMY Bilateral 2015    [facility]    SALPINGECTOMY-LAPAROSCOPIC Bilateral 5/19/2015    Performed by Deny Dias Jr., MD at [facility] OR    SINUS SURGERY FUNCTIONAL ENDOSCOPIC WITH NAVIGATION Bilateral 4/3/2017    Performed by Cesar Olsen MD at [facility] OR [address] [place] [address]    SINUS SURGERY FUNCTIONAL ENDOSCOPIC WITH NAVIGATION, 11371, 14377, 12863, 06322 Bilateral 2/5/2015    [facility]    SPHENOIDECTOMY Bilateral 4/9/2019    Performed by Adin Burks III, MD at [facility] OR [address] [place] [address]    THYROPLASTY - Medialization laryngoplasty, cricothyroid subluxation, arytenoid repositioning Left 8/2/2016    Performed by Gary Null MD at [facility] OR 2ND FLR    TRANSPLANT-LUNG  Bilateral 6/11/2014    Performed by Adolfo Huston MD at [facility] OR [address] [place] [address]     Family History     Problem Relation (Age of Onset)    Cancer Maternal Grandmother    Diabetes Maternal Grandfather    Drug abuse Maternal Grandfather    Hypertension Maternal Grandmother    Thrombocytopenia Maternal Grandfather        Tobacco Use    Smoking status: Never Smoker    Smokeless tobacco: Never Used   Substance and Sexual Activity    Alcohol use: No     Comment: Has not had an alcoholic drink in more than 2 months.    Drug use: No    Sexual activity: Yes     Partners: Male     Birth control/protection: See Surgical Hx     Comment: current partner since Oct 2016     Review of patient's allergies indicates:   Allergen Reactions    Albuterol Palpitations    Colistin Anaphylaxis    Vancomycin analogues      Infusion reaction that does not resolve with slowing  Pt. States she can tolerate it when given with 50 mg Benadryl and ran over 3 hours    Neupogen [filgrastim] Other (See Comments)     Ostealgia after five daily doses of 300 mcg.      Bactrim [sulfamethoxazole-trimethoprim] Hives    Ceftazidime Hives     Pt stated can tolerate cefapine not ceftazidime    Ceftazidime     Dronabinol Other (See Comments)     Mental changes/hallucinations    Haldol [haloperidol lactate] Other (See Comments)     Seizure like activity    Nsaids (non-steroidal anti-inflammatory drug)      Cannot have due to lung transplant    Adhesive Rash     Cloth tape- please use tegaderm or paper tape    Aztreonam Rash    Ciprofloxacin Nausea And Vomiting     Projectile N/V, per patient.  Unwilling to retry therapy.       No current facility-administered medications on file prior to encounter.      Current Outpatient Medications on File Prior to Encounter   Medication Sig    metoprolol tartrate (LOPRESSOR) 25 MG tablet Take 1 tablet (25 mg total) by mouth 2 (two) times daily.    amLODIPine (NORVASC) 10 MG tablet Take 1 tablet (10 mg total) by  mouth once daily.    butalbital-acetaminophen-caffeine -40 mg (FIORICET, ESGIC) -40 mg per tablet Take 1 tablet by mouth every 6 (six) hours as needed for Pain.    calcium-vitamin D3 (OS-KATY 500 + D3) 500 mg(1,250mg) -200 unit per tablet Take 1 tablet by mouth 2 (two) times daily with meals.    dapsone 100 MG Tab Take 1 tablet (100 mg total) by mouth once daily.    diphenhydrAMINE (BENADRYL) 50 MG tablet Take 1 tablet (50 mg total) by mouth every 6 (six) hours as needed for Itching.    fexofenadine (ALLEGRA) 180 MG tablet Take 180 mg by mouth once daily.    fluconazole (DIFLUCAN) 150 MG Tab TAKE 1 TABLET BY MOUTH EVERY WEEK    fluticasone propionate (FLONASE) 50 mcg/actuation nasal spray INSERT 2 SPRAYS IN EACH NOSTRIL DAILY    folic acid (FOLVITE) 1 MG tablet Take 1 tablet (1,000 mcg total) by mouth once daily.    gabapentin (NEURONTIN) 300 MG capsule Take 1 capsule (300 mg total) by mouth 3 (three) times daily.    inhalation spacing device (PROCHAMBER) USE AS DIRECTED    levalbuterol (XOPENEX HFA) 45 mcg/actuation inhaler Inhale 1-2 puffs into the lungs.    levalbuterol (XOPENEX) 1.25 mg/3 mL nebulizer solution Take 3 mLs (1.25 mg total) by nebulization every 8 (eight) hours as needed for Wheezing or Shortness of Breath. Rescue    lipase-protease-amylase (CREON) 36,000-114,000- 180,000 unit CpDR Take 4 capsules by mouth 3 (three) times daily with meals. Take 3 with snacks as needed. (Patient taking differently: Take 5 capsules by mouth 3 (three) times daily with meals. )    LORazepam (ATIVAN) 2 MG Tab Take 1 tablet by mouth every 8 hours as needed for ANXIETY    magnesium oxide (MAG-OX) 400 mg (241.3 mg magnesium) tablet Take 1 tablet (400 mg total) by mouth 2 (two) times daily.    mometasone-formoterol (DULERA) 100-5 mcg/actuation HFAA Inhale 1 puff into the lungs 2 (two) times daily. Controller    montelukast (SINGULAIR) 10 mg tablet Take 1 tablet (10 mg total) by mouth once daily.     morphine (MS CONTIN) 15 MG 12 hr tablet Take 1 tablet by mouth twice a day    multivit,min52-folic-vitK-cQ10 (AQUADEKS) 100-700-10 mcg-mcg-mg Cap cap 1 tab twice daily    multivitamin-minerals no.55 (CENTRUM FLAVOR BURST ADULT) Chew     mycophenolate (CELLCEPT) 250 mg Cap Take 2 capsules (500 mg total) by mouth 2 (two) times daily.    omeprazole (PRILOSEC) 40 MG capsule Take 40 mg by mouth every morning.    ondansetron (ZOFRAN) 8 MG tablet Take 1 tablet (8 mg total) by mouth every 12 (twelve) hours as needed for Nausea.    oxycodone (ROXICODONE) 5 MG immediate release tablet Take 10 mg by mouth every 4 (four) hours as needed for Pain.    oxyCODONE (ROXICODONE) 5 MG immediate release tablet take 1 tablet by mouth twice a day    polyethylene glycol (GLYCOLAX) 17 gram PwPk Take 17 g by mouth 2 (two) times daily.    predniSONE (DELTASONE) 5 MG tablet Take 1 tablet (5 mg total) by mouth once daily.    promethazine (PHENERGAN) 25 MG tablet Take 1 tablet (25 mg total) by mouth every 6 (six) hours as needed for Nausea.    QUEtiapine (SEROQUEL) 25 MG Tab Take 1 tablet by mouth in morning and 2 tablets at bedtime    sodium chloride 3% 3 % nebulizer solution Take 4 mLs by nebulization once daily.    tacrolimus (PROGRAF) 0.5 MG Cap Take 1 capsule (0.5 mg total) by mouth every 12 (twelve) hours.    tacrolimus (PROGRAF) 1 MG Cap Take 2 capsules (2 mg total) by mouth every 12 (twelve) hours. Daily doses: 2.5 mg twice daily.    tiZANidine (ZANAFLEX) 4 MG tablet TAKE 2 TABLETS BY MOUTH EVERY 6 HOURS AS NEEDED    tobramycin with nebulizer 300 mg/5 mL Nebu Take 300 mg by nebulization every 12 (twelve) hours.    topiramate (TOPAMAX) 25 MG tablet Take 1 tablet (25 mg total) by mouth 2 (two) times daily    venlafaxine (EFFEXOR-XR) 150 MG Cp24 Take 1 capsule (150 mg total) by mouth once daily.    venlafaxine (EFFEXOR-XR) 37.5 MG 24 hr capsule Take 1 capsule (37.5 mg total) by mouth once daily.     Psychotherapeutics  "(From admission, onward)    Start     Stop Route Frequency Ordered    07/07/19 2100  venlafaxine 24 hr capsule 37.5 mg      -- Oral Nightly 07/07/19 1706 07/07/19 2100  venlafaxine 24 hr capsule 150 mg      -- Oral Nightly 07/07/19 1706 07/07/19 1051  LORazepam tablet 2 mg      -- Oral Every 8 hours PRN 07/07/19 0951    07/01/19 2100  QUEtiapine tablet 50 mg      -- Oral Nightly 07/01/19 1148        Review of Systems  Strengths and Liabilities: Strength: Patient is intelligent., Strength: Patient has positive support network., Strength: Patient has reasonable judgment.    Objective:     Vital Signs (Most Recent):  Temp: 98.8 °F (37.1 °C) (07/11/19 1137)  Pulse: 93 (07/11/19 1137)  Resp: 17 (07/11/19 1137)  BP: (!) 134/91 (07/11/19 1137)  SpO2: (!) 92 % (07/11/19 1137) Vital Signs (24h Range):  Temp:  [98 °F (36.7 °C)-100.1 °F (37.8 °C)] 98.8 °F (37.1 °C)  Pulse:  [70-93] 93  Resp:  [15-18] 17  SpO2:  [90 %-96 %] 92 %  BP: (132-160)/() 134/91     Height: 5' 1" (154.9 cm)  Weight: 59.4 kg (131 lb)  Body mass index is 24.75 kg/m².      Intake/Output Summary (Last 24 hours) at 7/11/2019 1441  Last data filed at 7/11/2019 1358  Gross per 24 hour   Intake 600 ml   Output 2200 ml   Net -1600 ml       Physical Exam   Psychiatric:   Mental Status Exam:  Appearance: older than stated age, malnourished, thin & gaunt looking  Behavior/Cooperation: reluctant to participate, psychomotor retardation, eye contact minimal  Speech: slowed, soft  Mood: anxious and depressed  Affect: anxious and constricted  Thought Process: normal and logical  Thought Content: normal, no suicidality, no homicidality, delusions, or paranoia   Orientation: grossly intact  Memory: Grossly intact  Attention Span/Concentration: Normal  Insight: fair  Judgment: fair        Significant Labs: All pertinent labs within the past 24 hours have been reviewed.    Significant Imaging: I have reviewed all pertinent imaging results/findings within the " "past 24 hours.    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 Psychiatry was consulted due to history of anxiety. The patient has an established history with outpatient psychiatry and recently started seeing Dr. Amador at [facility] in February 2019, at which time she moved from [place]. Main support system consists of common law  who is MPOA and primary caretaker.  was present at the bedside and provided much of the history. Current psych meds include: Seroquel 50 mg qhs, Venlafaxine 187.5 mg daily, and Ativan 2 mg q8h PRN.     Seroquel is too sedating for the patient after her recovery, and the Venlafaxine has not been doing much according to her. Ativan helps at times, but O2 sats are low currently, and the patient is not currently getting it due to excessive sedation due to trouble with recovery. Patient would also like a medication that would help with sleep without "making me a zombie." She feels that better sleep may help with daytime anxiety symptoms. She would also like a separate agent for anxiety, however. Patient would benefit from Ramelteon as a non-sedating and non habit forming sleep aid. She would also benefit from continued Ativan when she has better O2 saturations.     Recommendations:   - Continue Ativan for anxiety but reduce to 1 mg q8h PRN, to be given only with O2 sat > 95, when normotensive (SBP > 110) and not concurrently with opioids.   - Continue Venlafaxine 187.5 mg daily for depressed mood   - Start Ramelteon 8 mg qhs for insomnia   - Discontinue Seroquel due to excessive sediation    Thank you for this interesting consult. We will continue to follow this patient.          Total Time:  60 minutes      Vidal Garcia" MD   Psychiatry  [facility]

## 2019-07-11 NOTE — ASSESSMENT & PLAN NOTE
Transitioned to home regimen of MS Contin 15 mg BID.  Oxy 10 mg Q6hrs PRN. Continue to closely monitor status. Limit sedating medications. Ensure patient is fully alert and awake prior to eating to avoid risk of aspiration.

## 2019-07-12 NOTE — PLAN OF CARE
Problem: Adult Inpatient Plan of Care  Goal: Plan of Care Review  Outcome: Ongoing (interventions implemented as appropriate)  Pt aao x3. Vss.  at bedside. Pt ambulated to bathroom with PCT and back to bed with . Pt sat up in cardiac chair. Pt ambulated to bathroom contact guard assist. Pt on contact precautions for . Blood sugar being monitored ac and hs. See assessment for full chart details. Will continue to monitor, assess and adjust care as needed.

## 2019-07-12 NOTE — CARE UPDATE
BG goal 140-180     Remains in TSU, NAEON  BG mildly elevated yesterday afternoon  Prednisone 20 mg daily  On IV antibiotics     Continue:  Novolog 4-6 units with meals  Novolog 3 units PRN snack dose  Moderate dose correction scale  BG monitoring AC/HS     Endocrine to continue to follow     ** Please call Endocrine for any BG related issues **

## 2019-07-12 NOTE — PROGRESS NOTES
"[facility]  Psychiatry  Progress Note    Patient Name: Juanita Ibarra  MRN: [id number]   Code Status: Full Code  Admission Date: 6/17/2019  Hospital Length of Stay: 24 days  Expected Discharge Date: 7/16/2019  Attending Physician: Jake Alvarez MD  Primary Care Provider: Primary Doctor No    Current Legal Status: N/A    Patient information was obtained from patient and past medical records.     Subjective:     Principal Problem:S/P lung transplant    Chief Complaint: Anxiety    HPI:   Juanita Ibarra is a 29 y.o. female with a past psychiatric history of anxiety who presented to [facility] due to S/P lung transplant. Psychiatry was consulted for medication management of anxiety.     Per Primary Team:  The patient has been hospitalized since 6/17/19 for her second lung transplant. She successfully underwent the procedure about three weeks ago and has been recovering, previously in the ICU and now in a step down unit. She had her first lung transplant in 2014. She has a history of cystic fibrosis. Long term history of anxiety and depression. Consult to psychiatry was for med management of anxiety.     Currently, the patient is on broad spectrum antibiotics, antivirals and antifungals, as well as immunosuppressants and opioids for pain control. According to RN, the patient has periods where she seems "back to her baseline" but also times when she seems very lethargic. Patient is on Ativan PRN for anxiety but has not been given to her due to concerns for sedation. According to Jacqueline who is mid-level taking care of the patient, the patient is allowed to have the Ativan PRN and it is not considered contraindicated by her physicians after the lung transplant.     Current psych meds: Ativan 2 mg q8h PRN, Seroquel 50 mg qhs (not getting due to excess sedation), Venlafaxine 187.5 mg/d.     Per Psychiatry:  The patient was seen at the bedside with common-law  present. They are not " legally , but  is primary caretaker as well as MPOA.  answered much of the history because patient was excessively tired and mainly only responded to yes or no questions. Patient has been seeing Dr. Amador in outpatient psychiatry here since February, at which time they moved here from [place], [place].     Dr. Amador has tried patient on Seroquel as an outpatient which seemed to be effective in controlling anxiety, but here in the hospital she has not been getting the medication because it makes her too tired. Has also tried the following for her symptoms: Hydroxyzine (sometimes too sedating, sometimes too activating), Venlafaxine (currently taking), Buspirone (too activating. Did not sleep well for four days), Klonopin (did not like it.. Took it earlier in her 20s.. Nonspecific as to why it was not preferred).     Main symptoms are insomnia and anxiety. Cannot describe what she is anxious about but just feels an overall uneasiness in her life, on a daily basis. Not related to having trouble breathing. Insomnia is described as wanting to sleep late and get up late. At home, her schedule is to sleep 2-3 am and wake up at 11 am. This is hard in the hospital. Stated that she has been doing this all her life. Wanted to try a med that would help her sleep and another regimen for anxiety. Ativan seems to help.     Denies manic symptoms and no psychotic symptoms. Denies history of suicidal/ homicidal thinking. No suicide attempts. No history of illicit drug use and no alcohol/tobacco use.     Collateral:   None obtained     Medical Review of Systems:  Pertinent items are noted in HPI.    Psychiatric Review of Systems-is patient experiencing or having changes in  sleep: yes  appetite: yes  weight: no  energy/anergy: yes  interest/pleasure/anhedonia: no  somatic symptoms: no  libido: no  anxiety/panic: yes  guilty/hopelessness: no  concentration: no  S.I.B.s/risky behavior: no  any drugs: no  alcohol:  no     Allergies:  Albuterol; Colistin; Vancomycin analogues; Neupogen [filgrastim]; Bactrim [sulfamethoxazole-trimethoprim]; Ceftazidime; Ceftazidime; Dronabinol; Haldol [haloperidol lactate]; Nsaids (non-steroidal anti-inflammatory drug); Adhesive; Aztreonam; and Ciprofloxacin    Past Medical/Surgical History:  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    Cystic fibrosis of the lung     Ear infection     Hypertension     Migraine headache     MRSA (methicillin resistant Staphylococcus aureus) carrier     Osteopenia     Other specified disease of pancreas     Pain disorder     Seizures 2013    Sinusitis, chronic     Vocal cord paralysis 06/2014    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     Past Surgical History:   Procedure Laterality Date    ABDOMINAL SURGERY      peg tube     AECJUAIR-INFMIHSGPTA-KYXIRSAYYJOY N/A 5/19/2015    Performed by Deny Dias Jr., MD at [facility] [place]    BIOPSY-BRONCHUS N/A 12/20/2016    Performed by Jake Alvarez MD at [facility] OR 2ND FLR    Bronchoscopy N/A 7/5/2019    Performed by Francisca Morin DO at [facility] OR [facility]    BRONCHOSCOPY Bilateral 12/11/2018    Performed by Francisca Morin DO at [facility] OR [facility]    BRONCHOSCOPY N/A 10/1/2018    Performed by Jake Alvarez MD at [facility] OR [facility]    BRONCHOSCOPY Bilateral 12/20/2016    Performed by Jake Alvarez MD at [facility] OR [facility]    BRONCHOSCOPY - flexible bronchoscopy with probable tissue biopsy CPT 34779 N/A 7/21/2015    Performed by Jake Alvarez MD at [facility] OR [facility]    BRONCHOSCOPY - flexible bronchoscopy with probable tissue biospy CPT 38835 N/A 10/20/2015    Performed by Jake Alvarez MD at [facility] OR [facility]    BRONCHOSCOPY - flexible bronchoscopy with tissue biopsy CPT 03684 N/A 9/29/2015    Performed by Jake Alvarez MD at [facility] OR [facility]    BRONCHOSCOPY - flexible bronchoscopy with tissue biopsy CPT 09591 N/A  5/26/2015    Performed by Jake Alvarez MD at [facility] OR [facility]ST [facility]LR    BRONCHOSCOPY flexible bronchoscopy with tissue biopsy N/A 9/8/2014    Performed by Jake Alvarez MD at [facility] OR [facility]    BRONCHOSCOPY flexible with possible tissue biopsy N/A 8/8/2014    Performed by Jake Alvarez MD at [facility] OR [facility]    BRONCHOSCOPY, BAL, BIOPSIES N/A 3/20/2018    Performed by Jake Alvarez MD at [facility] OR [facility]    BRONCHOSCOPY-FIBEROPTIC N/A 1/29/2016    Performed by Joann Cifuentes DO at [facility] OR 2ND [facility]LR    BRONCHOSCOPY; Bronchoscopy with BAL and transbronchial biopsies under general anesthesia N/A 5/29/2018    Performed by Jake Alvarez MD at [facility] OR [facility]ND [facility]    CHOLECYSTECTOMY  2016    CHOLECYSTECTOMY-LAPAROSCOPIC N/A 7/22/2016    Performed by Joshua Goldberg, MD at [facility] OR [facility]    COLONOSCOPY N/A 5/3/2019    Performed by Juancho Rich MD at [facility] ENDO (2ND FLR)    ENDOMETRIAL ABLATION  2015    [facility]    ETHMOIDECTOMY Bilateral 4/9/2019    Performed by Adin Burks III, MD at [facility] OR [facility]    FESS      FESS Bilateral 10/21/2013    Performed by Jared Whatley MD at [facility] OR [facility]ND [facility]    FESS, USING COMPUTER-ASSISTED NAVIGATION N/A 4/9/2019    Performed by Adin Burks III, MD at [facility] OR 2ND [facility]LR    FINE NEEDLE ASPIRATION (FNA)  of a cervical lymphadenopathy  1/29/2016    Performed by Joann Cifuentes DO at [facility] OR 2ND [facility]LR    flex bronchoscopy with probable tissue biopsy N/A 7/31/2014    Performed by [facility] at [facility] OR 2ND [facility]    flexible bronchoscopy with tissue biopsy N/A 12/11/2014    Performed by Jake Alvarez MD at [facility] OR [facility]    HYSTERECTOMY  04/2017    [facility]    INJECTION-VOCAL CORD Left 6/24/2014    Performed by Jared Whatley MD at [facility] OR [facility]    KBFULCQXL-IJVA-G-CATH to right chest and removal of portacath to left chests wall. Bilateral 6/24/2014    Performed by Zhen Dorado MD at [facility] OR 2ND [facility]    LARYNX SURGERY  2016    LUNG  TRANSPLANT Bilateral 6/12/14    MAXILLARY ANTROSTOMY  4/9/2019    Performed by Adin Burks III, MD at [facility] OR [address] [place] [address]    MYRINGOTOMY W/ TUBES Right 04/2017    MYRINGOTOMY WITH INSERTION OF PE TUBES Right 4/15/2017    Performed [facility] Gary [facility]    PLACEMENT-TUBE-PEG  5/15/2014    Performed by David Moses MD at [facility] OR [address] [place] [address]    PORTACATH PLACEMENT Right     rt sc    REDO BILATERAL LUNG TRANSPLANT; CLAMSHELL Bilateral 6/18/2019    Performed by Jnoathan Newby MD at [facility] OR [address] [place] [address]    REMOVAL-TUBE-PEG  5/15/2014    Performed by David Moses MD at [facility] OR [address] [place] [address]    RHC for lung re-transplant N/A 1/22/2019    Performed by Laura Rachel MD at [facility] CATH LAB    ROBOTIC ASSISTED LAPAROSCOPIC HYSTERECTOMY N/A 4/25/2017    Performed by Deny Dias Jr., MD at [facility] OR    SALPINGECTOMY Bilateral 2015    [facility]    SALPINGECTOMY-LAPAROSCOPIC Bilateral 5/19/2015    Performed by Deny Dias Jr., MD at [facility] OR    SINUS SURGERY FUNCTIONAL ENDOSCOPIC WITH NAVIGATION Bilateral 4/3/2017    Performed by Cesar Olsen MD at [facility] OR [address] [place] [address]    SINUS SURGERY FUNCTIONAL ENDOSCOPIC WITH NAVIGATION, 56572, 91606, 92033, 13888 Bilateral 2/5/2015    Performed by Cesar Olsen MD at [facility] OR [address] [place] [address]    SPHENOIDECTOMY Bilateral 4/9/2019    Performed by Adin Burks III, MD at [facility] OR [address] [place] [address]    THYROPLASTY - Medialization laryngoplasty, cricothyroid subluxation, arytenoid repositioning Left 8/2/2016    Performed by Gary Null MD at [facility] OR [address] [place] [address]    TRANSPLANT-LUNG Bilateral 6/11/2014    Performed by Adolfo Huston MD at [facility] OR [address] [place] [address]       Past Psychiatric History:  Previous Medication Trials: yes   Previous Psychiatric Hospitalizations: no   Previous Suicide Attempts: no   History of Violence: no  Outpatient Psychiatrist: yes    Social History:  Marital Status:   Children: 0   Employment Status/Info: never employed   Education: some  "college  Special Ed: no  Housing Status: lives with common law    History of phys/sexual abuse: unknown  Access to gun: no    Substance Abuse History:  Recreational Drugs: denies   Use of Alcohol: denied  Rehab History: no   Tobacco Use: no  Use of OTC: no     Legal History:  Past Charges/Incarcerations: no   Pending charges: no     Family Psychiatric History:   Unknown     Psychosocial Stressors: health  Functioning Relationships: good support system    Hospital Course: 7/11/2019 seen today for initial consult request. Patient appeared excessively tired and asked consultant to come at another time or to speak to her  who was sitting at the bedside.     7/12/2019 Patient was seen today in her room with  at bedside. Appeared brighter in affect and was sitting up in her chair, making better eye contact but irritable, demanding Ativan. Was informed that Ativan was recommended to be 1 mg q8h PRN, at which she responded "my therapist says I can have 2 mg three times a day." Was informed that Ativan affects respiratory drive. This education did not affect patient's opinion.     Interval History: See above     Family History     Problem Relation (Age of Onset)    Cancer Maternal Grandmother    Diabetes Maternal Grandfather    Drug abuse Maternal Grandfather    Hypertension Maternal Grandmother    Thrombocytopenia Maternal Grandfather        Tobacco Use    Smoking status: Never Smoker    Smokeless tobacco: Never Used   Substance and Sexual Activity    Alcohol use: No     Comment: Has not had an alcoholic drink in more than 2 months.    Drug use: No    Sexual activity: Yes     Partners: Male     Birth control/protection: See Surgical Hx     Comment: current partner since Oct 2016     Psychotherapeutics (From admission, onward)    Start     Stop Route Frequency Ordered    07/12/19 0735  LORazepam tablet 1 mg      -- Oral Every 8 hours PRN 07/12/19 0735    07/07/19 2100  venlafaxine 24 hr capsule 37.5 " "mg      -- Oral Nightly 07/07/19 1706 07/07/19 2100  venlafaxine 24 hr capsule 150 mg      -- Oral Nightly 07/07/19 1706 07/01/19 2100  QUEtiapine tablet 50 mg      -- Oral Nightly 07/01/19 1148           Review of Systems  Objective:     Vital Signs (Most Recent):  Temp: 98 °F (36.7 °C) (07/12/19 0830)  Pulse: 97 (07/12/19 0858)  Resp: (!) 22 (07/12/19 0858)  BP: (!) 135/102 (07/12/19 0830)  SpO2: (!) 94 % (07/12/19 0858) Vital Signs (24h Range):  Temp:  [98 °F (36.7 °C)-99.3 °F (37.4 °C)] 98 °F (36.7 °C)  Pulse:  [77-97] 97  Resp:  [14-22] 22  SpO2:  [90 %-97 %] 94 %  BP: (132-146)/() 135/102     Height: 5' 1" (154.9 cm)  Weight: 58 kg (127 lb 12.8 oz)  Body mass index is 24.15 kg/m².      Intake/Output Summary (Last 24 hours) at 7/12/2019 1208  Last data filed at 7/12/2019 0530  Gross per 24 hour   Intake 980 ml   Output 1600 ml   Net -620 ml       Physical Exam   Psychiatric:   Mental Status Exam:  Appearance: malnourished, thin & gaunt looking  Behavior/Cooperation: hostile  Speech: soft, monotone  Mood: angry and anxious  Affect: anxious  Thought Process: normal and logical  Thought Content: normal, no suicidality, no homicidality, delusions, or paranoia   Orientation: grossly intact  Memory: Grossly intact  Attention Span/Concentration: Normal  Insight: limited  Judgment: limited        Significant Labs: All pertinent labs within the past 24 hours have been reviewed.    Significant Imaging: I have reviewed all pertinent imaging results/findings within the past 24 hours.    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 Psychiatry was consulted due to history of anxiety. The patient has an established history with outpatient psychiatry and " recently started seeing Dr. Amador at [facility] in February 2019, at which time she moved from [place]. Main support system consists of common law  who is MPOA and primary caretaker.     Today, patient was demanding Ativan. She appears to be med seeking at times. Would recommend continuing 1 mg q8h PRN due to risk of depressed respiratory drive, within the parameters listed below. Patient is no longer interested in a scheduled sleep aid. Can change Ramleteon to PRN.     Recommendations:   - Ativan for anxiety 1 mg q8h PRN, to be given only with O2 sat > 95, when normotensive (SBP > 110) and not concurrently with opioids.   - Continue Venlafaxine 187.5 mg daily for depressed mood   - Change Ramelteon 8 mg scheduled to PRN for insomnia       Thank you for this interesting consult. We will continue to follow this patient.          Need for Continued Hospitalization:   No need for inpatient psychiatric hospitalization. Continue medical care as per the primary team.    Anticipated Disposition: Admitted as an Inpatient     Total time:  25 with greater than 50% of this time spent in counseling and/or coordination of care.       Vidal Garcia MD   Psychiatry  [facility]  Pager [phone]

## 2019-07-12 NOTE — ASSESSMENT & PLAN NOTE
Previously with  Pseudomonas from 6/20 and 6/23 bronchial wash. Received Tobramycin x1 on 6/22 and was transitioned from Merrem to Cipro. BAL from 7/5 with  Pseudomonas. Started on Zerbaxa 7/9. ID following, appreciate recs.

## 2019-07-12 NOTE — PT/OT/SLP PROGRESS
Physical Therapy      Patient Name:  Juanita Ibarra   MRN:  [id number]    Patient not seen today secondary to Patient fatigue. Will follow-up over weekend.    Wiliam Bailey, PT   7/12/2019

## 2019-07-12 NOTE — SUBJECTIVE & OBJECTIVE
"Interval History: See above     Family History     Problem Relation (Age of Onset)    Cancer Maternal Grandmother    Diabetes Maternal Grandfather    Drug abuse Maternal Grandfather    Hypertension Maternal Grandmother    Thrombocytopenia Maternal Grandfather        Tobacco Use    Smoking status: Never Smoker    Smokeless tobacco: Never Used   Substance and Sexual Activity    Alcohol use: No     Comment: Has not had an alcoholic drink in more than 2 months.    Drug use: No    Sexual activity: Yes     Partners: Male     Birth control/protection: See Surgical Hx     Comment: current partner since Oct 2016     Psychotherapeutics (From admission, onward)    Start     Stop Route Frequency Ordered    07/12/19 0735  LORazepam tablet 1 mg      -- Oral Every 8 hours PRN 07/12/19 0735    07/07/19 2100  venlafaxine 24 hr capsule 37.5 mg      -- Oral Nightly 07/07/19 1706 07/07/19 2100  venlafaxine 24 hr capsule 150 mg      -- Oral Nightly 07/07/19 1706 07/01/19 2100  QUEtiapine tablet 50 mg      -- Oral Nightly 07/01/19 1148           Review of Systems  Objective:     Vital Signs (Most Recent):  Temp: 98 °F (36.7 °C) (07/12/19 0830)  Pulse: 97 (07/12/19 0858)  Resp: (!) 22 (07/12/19 0858)  BP: (!) 135/102 (07/12/19 0830)  SpO2: (!) 94 % (07/12/19 0858) Vital Signs (24h Range):  Temp:  [98 °F (36.7 °C)-99.3 °F (37.4 °C)] 98 °F (36.7 °C)  Pulse:  [77-97] 97  Resp:  [14-22] 22  SpO2:  [90 %-97 %] 94 %  BP: (132-146)/() 135/102     Height: 5' 1" (154.9 cm)  Weight: 58 kg (127 lb 12.8 oz)  Body mass index is 24.15 kg/m².      Intake/Output Summary (Last 24 hours) at 7/12/2019 1208  Last data filed at 7/12/2019 0530  Gross per 24 hour   Intake 980 ml   Output 1600 ml   Net -620 ml       Physical Exam   Psychiatric:   Mental Status Exam:  Appearance: malnourished, thin & gaunt looking  Behavior/Cooperation: hostile  Speech: soft, monotone  Mood: angry and anxious  Affect: anxious  Thought Process: normal and " logical  Thought Content: normal, no suicidality, no homicidality, delusions, or paranoia   Orientation: grossly intact  Memory: Grossly intact  Attention Span/Concentration: Normal  Insight: limited  Judgment: limited        Significant Labs: All pertinent labs within the past 24 hours have been reviewed.    Significant Imaging: I have reviewed all pertinent imaging results/findings within the past 24 hours.

## 2019-07-12 NOTE — ASSESSMENT & PLAN NOTE
POD#42 s/p bilateral lung transplant (re-transplant) for CARLOS EDUARDO. 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 PGD 0. S/p bronch and extubated to HFNC on POD#2, but was re-intubated 6/21 for hypercapnic respiratory failure. Bronchoscopy 7/5 with  pseudomonas.     Continue PT/OT/CPT/nebs/IS. Continue immunosuppression and prophylaxis.

## 2019-07-12 NOTE — ASSESSMENT & PLAN NOTE
The patient is a 29 year old lady with a past medical history of Cystic Fibrosis, s/p second lung transplant in June 2019, approximately three weeks ago, who has been recovering since that time here in the hospital, initially in the ICU and now in a step down unit. Psychiatry was consulted due to history of anxiety. The patient has an established history with outpatient psychiatry and recently started seeing Dr. Amador at [facility] in February 2019, at which time she moved from [place]. Main support system consists of common law  who is MPOA and primary caretaker.     Today, patient was demanding Ativan. She appears to be med seeking at times. Would recommend continuing 1 mg q8h PRN due to risk of depressed respiratory drive, within the parameters listed below. Patient is no longer interested in a scheduled sleep aid. Can change Ramleteon to PRN.     Recommendations:   - Ativan for anxiety 1 mg q8h PRN, to be given only with O2 sat > 95, when normotensive (SBP > 110) and not concurrently with opioids.   - Continue Venlafaxine 187.5 mg daily for depressed mood   - Change Ramelteon 8 mg scheduled to PRN for insomnia       Thank you for this interesting consult. We will continue to follow this patient.

## 2019-07-12 NOTE — PROGRESS NOTES
[facility]  Lung Transplant  Progress Note - Floor    Patient Name: Juanita Ibarar  MRN: [id number]  Admission Date: 6/17/2019  Hospital Length of Stay: 24 days  Post-Operative Day: 1856 (Lung), 24 (Lung)  Attending Physician: Jake Alvarez MD  Primary Care Provider: Primary Doctor No     Subjective:     Interval History: No acute events overnight. More alert and ambulating in room with assistance. Requesting more pain medications and benadryl today.       Continuous Infusions:    Scheduled Meds:   ceftolozane-tazobactam  1,500 mg Intravenous Q8H    cetirizine  10 mg Oral Daily    dapsone  100 mg Oral Daily    enoxaparin  40 mg Subcutaneous Daily    famotidine  20 mg Oral BID    gabapentin  300 mg Oral TID    insulin aspart U-100  4-6 Units Subcutaneous TIDWM    levalbuterol  1.25 mg Nebulization Q6H WAKE    lipase-protease-amylase 24,000-76,000-120,000 units  4 capsule Oral TID WM    metoprolol tartrate  25 mg Oral TID    micafungin (MYCAMINE) IVPB  100 mg Intravenous Q24H    morphine  15 mg Oral Q12H    mycophenolate  500 mg Oral BID    predniSONE  20 mg Oral Daily    QUEtiapine  50 mg Oral QHS    sodium chloride 7%  4 mL Nebulization Q12H    tacrolimus  0.5 mg Oral BID    valGANciclovir  450 mg Oral BID    venlafaxine  150 mg Oral QHS    venlafaxine  37.5 mg Oral QHS    voriconazole (VFEND) IVPB (wt < 83 kg)  4 mg/kg Intravenous Q12H     PRN Meds:sodium chloride, acetaminophen, bisacodyl, Dextrose 10% Bolus, Dextrose 10% Bolus, diphenhydrAMINE, glucagon (human recombinant), glucose, glucose, insulin aspart U-100, insulin aspart U-100, lactulose, levalbuterol, lipase-protease-amylase 24,000-76,000-120,000 units, LORazepam, magnesium sulfate IVPB, metoclopramide HCl, metoprolol, naloxone, ondansetron, ondansetron, oxyCODONE, potassium chloride in water **AND** potassium chloride in water **AND** potassium chloride in water, promethazine (PHENERGAN) IVPB    Review  of patient's allergies indicates:   Allergen Reactions    Albuterol Palpitations    Colistin Anaphylaxis    Vancomycin analogues      Infusion reaction that does not resolve with slowing  Pt. States she can tolerate it when given with 50 mg Benadryl and ran over 3 hours    Neupogen [filgrastim] Other (See Comments)     Ostealgia after five daily doses of 300 mcg.      Bactrim [sulfamethoxazole-trimethoprim] Hives    Ceftazidime Hives     Pt stated can tolerate cefapine not ceftazidime    Ceftazidime     Dronabinol Other (See Comments)     Mental changes/hallucinations    Haldol [haloperidol lactate] Other (See Comments)     Seizure like activity    Nsaids (non-steroidal anti-inflammatory drug)      Cannot have due to lung transplant    Adhesive Rash     Cloth tape- please use tegaderm or paper tape    Aztreonam Rash    Ciprofloxacin Nausea And Vomiting     Projectile N/V, per patient.  Unwilling to retry therapy.       Review of Systems   Constitutional: Positive for appetite change and fatigue. Negative for chills, diaphoresis and fever.   HENT: Negative for congestion, postnasal drip, rhinorrhea, sinus pressure and sinus pain.    Respiratory: Positive for cough and shortness of breath. Negative for wheezing.    Cardiovascular: Positive for chest pain (incision site). Negative for palpitations and leg swelling.   Gastrointestinal: Negative for abdominal distention, abdominal pain, constipation, diarrhea, nausea and vomiting.   Genitourinary: Negative for decreased urine volume, difficulty urinating, dysuria and urgency.   Musculoskeletal: Positive for back pain (chronic). Negative for neck pain and neck stiffness.   Skin: Positive for pallor. Negative for rash and wound.   Allergic/Immunologic: Positive for immunocompromised state.   Neurological: Positive for weakness. Negative for dizziness, seizures, syncope and headaches.   Psychiatric/Behavioral: Negative for agitation, confusion and decreased  concentration. The patient is nervous/anxious.      Objective:   Physical Exam   Constitutional: She is oriented to person, place, and time. She appears well-developed and well-nourished. She is easily aroused. No distress.   HENT:   Head: Normocephalic and atraumatic.   Nose: Nose normal.   Eyes: Conjunctivae and EOM are normal. No scleral icterus.   Neck: Normal range of motion. Neck supple. No JVD present.   Cardiovascular: Normal rate, regular rhythm and normal heart sounds. Exam reveals no friction rub.   No murmur heard.  Pulmonary/Chest: Effort normal. No accessory muscle usage. No respiratory distress. She has decreased breath sounds in the right lower field and the left lower field. She has wheezes. She has no rhonchi. She has no rales.   Abdominal: Soft. Bowel sounds are normal. She exhibits no distension. There is no tenderness.   Musculoskeletal: Normal range of motion. She exhibits no edema.   Neurological: She is alert, oriented to person, place, and time and easily aroused.   Skin: Skin is warm and dry. She is not diaphoretic. No erythema.   Psychiatric: Judgment normal. Her mood appears not anxious. Her speech is not delayed. She is not slowed. She exhibits normal recent memory.   Nursing note and vitals reviewed.        Vital Signs (Most Recent):  Temp: 98 °F (36.7 °C) (07/12/19 0830)  Pulse: 97 (07/12/19 0858)  Resp: (!) 22 (07/12/19 0858)  BP: (!) 135/102 (07/12/19 0830)  SpO2: (!) 94 % (07/12/19 0858) Vital Signs (24h Range):  Temp:  [98 °F (36.7 °C)-99.3 °F (37.4 °C)] 98 °F (36.7 °C)  Pulse:  [77-97] 97  Resp:  [14-22] 22  SpO2:  [90 %-97 %] 94 %  BP: (132-146)/() 135/102     Weight: 58 kg (127 lb 12.8 oz)  Body mass index is 24.15 kg/m².      Intake/Output Summary (Last 24 hours) at 7/12/2019 1157  Last data filed at 7/12/2019 0530  Gross per 24 hour   Intake 980 ml   Output 1600 ml   Net -620 ml       Ventilator Data:          Hemodynamic Parameters:       Lines/Drains:       Introducer  right internal jugular (Active)   Specific Qualities Capped 6/30/2019  7:10 AM   Dressing Status Clean;Dry;Biopatch in place;Intact 6/30/2019  7:10 AM   Dressing Intervention Dressing reinforced 6/29/2019  3:00 AM   Dressing Change Due 06/29/19 6/30/2019  3:00 AM   Daily Line Review Performed 6/30/2019  7:10 AM   Number of days:             Port A Cath Single Lumen 06/24/14 1200 right subclavian (Active)   Accessed by: Radha Lanza 6/24/2019  5:00 AM   Dressing Type Transparent 6/30/2019  7:10 AM   Dressing Status Biopatch in place;Clean;Dry;Intact 6/30/2019  7:10 AM   Dressing Intervention Dressing reinforced 6/29/2019  3:00 AM   Dressing Change Due 06/30/19 6/30/2019  3:00 AM   Line Status Infusing 6/30/2019  7:10 AM   Flush Performed Yes 6/30/2019  7:10 AM   Date to be Reflushed 06/18/19 6/29/2019  3:00 AM   Daily Line Review Performed 6/30/2019  7:10 AM   Type of Needle Nicole 6/30/2019  3:00 AM   Gauge 20 6/30/2019  3:00 AM   Needle Length 1 in 6/30/2019  3:00 AM   Needle Status Left in place 6/30/2019  3:00 AM   Needle Insertion Date 06/24/19 6/30/2019  3:00 AM   Needle Insertion Time 0500 6/26/2019  3:00 AM   Number of days: 1831            Percutaneous Central Line Insertion/Assessment - double lumen  06/18/19 0510 (Active)   Dressing biopatch in place;dressing dry and intact 6/30/2019  7:10 AM   Securement secured w/ sutures 6/30/2019  7:10 AM   Additional Site Signs no erythema;no warmth 6/30/2019  7:10 AM   Distal Patency/Care infusing 6/30/2019  7:10 AM   Proximal Patency/Care infusing 6/30/2019  7:10 AM   Waveform normal 6/30/2019  7:10 AM   Line Interventions line leveled/zeroed 6/30/2019  7:10 AM   Dressing Change Due 06/29/19 6/30/2019  3:00 AM   Daily Line Review Performed 6/30/2019  7:10 AM   Number of days: 12            Peripheral IV - Single Lumen 06/27/19 0917 22 G Right Forearm (Active)   Site Assessment Clean;Dry;Intact;No redness;No swelling 6/30/2019  7:10 AM   Line Status Infusing  6/30/2019  7:10 AM   Dressing Status Clean;Dry;Intact 6/30/2019  7:10 AM   Dressing Intervention Dressing reinforced 6/29/2019  3:00 AM   Dressing Change Due 07/01/19 6/30/2019  3:00 AM   Site Change Due 07/01/19 6/29/2019  7:00 PM   Reason Not Rotated Not due 6/30/2019  7:10 AM   Number of days: 3            Chest Tube 06/29/19 1655 1 Right Fourth intercostal space;Midaxillary 7 Fr. (Active)   Chest Tube WDL WDL 6/30/2019  7:10 AM   Function -20 cm H2O 6/30/2019  7:10 AM   Air Leak/Fluctuation air leak present 6/30/2019  7:10 AM   Safety all tubing connections taped;2 rubber-tipped hemostats w/ patient;all connections secured;suction checked 6/30/2019  7:10 AM   Securement tubing secured to body distal to insertion site w/ tape 6/30/2019  7:10 AM   Patency Intervention Tip/tilt 6/30/2019  7:10 AM   Drainage Description Serosanguineous 6/30/2019  7:10 AM   Dressing Appearance occlusive gauze dressing intact 6/30/2019  7:10 AM   Dressing Care dressing changed 6/30/2019 10:00 AM   Left Subcutaneous Emphysema none present 6/30/2019  7:10 AM   Right Subcutaneous Emphysema none present 6/30/2019  7:10 AM   Site Assessment Clean;Dry;Intact;No redness;No swelling;Ecchymotic 6/30/2019 10:00 AM   Surrounding Skin Dry;Intact 6/30/2019  7:10 AM   Output (mL) 0 mL 6/30/2019 10:00 AM   Number of days: 0            Y Chest Tube 3 and 4 06/18/19 1151 3 Left Pleural 19 Fr. 4 Left Mediastinal 19 Fr. (Active)   Function -20 cm H2O 6/30/2019  7:10 AM   Air Leak/Fluctuation air leak not present 6/30/2019  7:10 AM   Safety all tubing connections taped;2 rubber-tipped hemostats w/ patient;all connections secured;suction checked 6/30/2019  7:10 AM   Securement tubing secured to body distal to insertion site w/ tape 6/30/2019  7:10 AM   Left Subcutaneous Emphysema none present 6/30/2019  7:10 AM   Right Subcutaneous Emphysema none present 6/30/2019  7:10 AM   Patency Intervention Tip/tilt 6/30/2019  7:10 AM   Drainage Description 3  Serosanguineous 6/30/2019  7:10 AM   Tube 3 Dressing Appearance occlusive gauze dressing intact 6/30/2019  7:10 AM   Tube 3 Dressing Care dressing changed 6/30/2019 10:00 AM   Site Assessment 3 Clean;Dry;Intact 6/30/2019 10:00 AM   Surrounding Skin 3 Dry;Intact 6/30/2019  7:10 AM   Drainage Description 4 Serosanguineous 6/30/2019  7:10 AM   Tube 4 Dressing Appearance occlusive gauze dressing intact 6/30/2019  7:10 AM   Tube 4 Dressing Care dressing changed 6/30/2019 10:00 AM   Site Assessment 4 Clean;Dry;Intact 6/30/2019 10:00 AM   Surrounding Skin 4 Dry;Intact 6/30/2019  7:10 AM   Output (mL) 0 mL 6/30/2019 10:00 AM   Number of days: 11       Significant Labs:  CBC:  Recent Labs   Lab 07/12/19  0500   WBC 5.56   RBC 3.66*   HGB 10.4*   HCT 34.3*      MCV 94   MCH 28.4   MCHC 30.3*     BMP:  Recent Labs   Lab 07/12/19  0500      K 4.1      CO2 26   BUN 10   CREATININE 0.8   CALCIUM 8.8      Tacrolimus Levels:  Recent Labs   Lab 07/12/19  0500   TACROLIMUS 5.3     Microbiology:  Microbiology Results (last 7 days)     Procedure Component Value Units Date/Time    Fungus culture [[id number]]  (Abnormal) Collected:  07/05/19 1445    Order Status:  Completed Specimen:  Respiratory from Bronchial Wash Updated:  07/11/19 0852     Fungus (Mycology) Culture BRANDI GLABRATA  Many      Narrative:       Bronchial Wash    Fungus culture [[id number]] Collected:  06/25/19 0900    Order Status:  Completed Specimen:  Respiratory from Bronchial Wash Updated:  07/10/19 0918     Fungus (Mycology) Culture Culture in progress      No fungus isolated after 2 weeks    Narrative:       Bronchial Wash    AFB Culture & Smear [[id number]] Collected:  07/05/19 1445    Order Status:  Completed Specimen:  Respiratory from Bronchial Wash Updated:  07/08/19 1540     AFB Culture & Smear Culture in progress     AFB CULTURE STAIN No acid fast bacilli seen.    Narrative:       Bronchial Wash    Culture, Respiratory [[id number]]   (Abnormal)  (Susceptibility) Collected:  07/05/19 8761    Order Status:  Completed Specimen:  Respiratory from Bronchial Wash Updated:  07/08/19 1100     Respiratory Culture No S aureus isolated.      PSEUDOMONAS AERUGINOSA   Moderate  Normal respiratory anibal also present       Gram Stain (Respiratory) Few WBC's     Gram Stain (Respiratory) Few Gram positive rods     Gram Stain (Respiratory) Few budding yeast    Narrative:       Bronchial Wash          I have reviewed all pertinent labs within the past 24 hours.    Diagnostic Results:  Chest X-Ray: Vascular catheters, right pleural drain and monitoring leads remain.  There is some high-density material in the upper abdomen likely from the modified barium swallow.  Heart is enlarged there is mild increase in the pulmonary vascular markings.  Persistent parenchymal opacification bilaterally.  No significant pneumothorax seen.  Probable right pleural effusion.           Assessment/Plan:     * S/P lung transplant  POD#42 s/p bilateral lung transplant (re-transplant) for CARLOS EDUARDO. 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 PGD 0. S/p bronch and extubated to HFNC on POD#2, but was re-intubated 6/21 for hypercapnic respiratory failure. Bronchoscopy 7/5 with  pseudomonas.     Continue PT/OT/CPT/nebs/IS. Continue immunosuppression and prophylaxis.     Immunosuppression  Previously on tacrolimus, MMF, and daily prednisone. S/p solumedrol in OR, basiliximab on POD#0 and POD#4. Continue MMF and steroid taper. Will resume tacrolimus 0.5 mg BID.  Monitor daily tacrolimus levels and adjust dose as needed.       Prophylactic antibiotic  CMV D-/R+. Received Vanc/Merrem as routine surgical prophylaxis, which was transitioned to Cipro. Continue OIP with valganciclovir and dapsone. Continue micafungin for Candida glabrata. Continue voriconazole for aspergillus.    Acute blood loss anemia  Received 6u PRBCs, 2u plts, 1 cryo, and 4 FFP angie-operatively. Will continue to monitor and be  conservative with future transfusions.     Anxiety disorder  Continue home dose of Effexor and Ativan. Psych consulted for assistance, appreciate recs.    Chronic pain with opiate use  Transitioned to home regimen of MS Contin 15 mg BID.  Oxy 10 mg Q6hrs PRN. Continue to closely monitor status. Limit sedating medications. Ensure patient is fully alert and awake prior to eating to avoid risk of aspiration.     CF related Pancreatic insufficiency  Continue Creon with meals and snacks.     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 Received Tobramycin x1 on 6/22 and was transitioned from Merrem to Cipro. BAL from 7/5 with  Pseudomonas. Started on Zerbaxa 7/9. ID following, appreciate recs.     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 Repeat BAL on 6/23 with Candida glabrata. Continue micafungin per ID recs.    Aspergillus  Positive aspergillus ag from BAL on 7/5. Continue voriconazole.    Adrenal cortical steroids causing adverse effect in therapeutic use  Endocrinology consulted, appreciate recs.        Shama Canales PA-C  Lung Transplant  [facility]

## 2019-07-12 NOTE — SUBJECTIVE & OBJECTIVE
Subjective:     Interval History: No acute events overnight. More alert and ambulating in room with assistance. Requesting more pain medications and benadryl today.       Continuous Infusions:    Scheduled Meds:   ceftolozane-tazobactam  1,500 mg Intravenous Q8H    cetirizine  10 mg Oral Daily    dapsone  100 mg Oral Daily    enoxaparin  40 mg Subcutaneous Daily    famotidine  20 mg Oral BID    gabapentin  300 mg Oral TID    insulin aspart U-100  4-6 Units Subcutaneous TIDWM    levalbuterol  1.25 mg Nebulization Q6H WAKE    lipase-protease-amylase 24,000-76,000-120,000 units  4 capsule Oral TID WM    metoprolol tartrate  25 mg Oral TID    micafungin (MYCAMINE) IVPB  100 mg Intravenous Q24H    morphine  15 mg Oral Q12H    mycophenolate  500 mg Oral BID    predniSONE  20 mg Oral Daily    QUEtiapine  50 mg Oral QHS    sodium chloride 7%  4 mL Nebulization Q12H    tacrolimus  0.5 mg Oral BID    valGANciclovir  450 mg Oral BID    venlafaxine  150 mg Oral QHS    venlafaxine  37.5 mg Oral QHS    voriconazole (VFEND) IVPB (wt < 83 kg)  4 mg/kg Intravenous Q12H     PRN Meds:sodium chloride, acetaminophen, bisacodyl, Dextrose 10% Bolus, Dextrose 10% Bolus, diphenhydrAMINE, glucagon (human recombinant), glucose, glucose, insulin aspart U-100, insulin aspart U-100, lactulose, levalbuterol, lipase-protease-amylase 24,000-76,000-120,000 units, LORazepam, magnesium sulfate IVPB, metoclopramide HCl, metoprolol, naloxone, ondansetron, ondansetron, oxyCODONE, potassium chloride in water **AND** potassium chloride in water **AND** potassium chloride in water, promethazine (PHENERGAN) IVPB    Review of patient's allergies indicates:   Allergen Reactions    Albuterol Palpitations    Colistin Anaphylaxis    Vancomycin analogues      Infusion reaction that does not resolve with slowing  Pt. States she can tolerate it when given with 50 mg Benadryl and ran over 3 hours    Neupogen [filgrastim] Other (See Comments)      Ostealgia after five daily doses of 300 mcg.      Bactrim [sulfamethoxazole-trimethoprim] Hives    Ceftazidime Hives     Pt stated can tolerate cefapine not ceftazidime    Ceftazidime     Dronabinol Other (See Comments)     Mental changes/hallucinations    Haldol [haloperidol lactate] Other (See Comments)     Seizure like activity    Nsaids (non-steroidal anti-inflammatory drug)      Cannot have due to lung transplant    Adhesive Rash     Cloth tape- please use tegaderm or paper tape    Aztreonam Rash    Ciprofloxacin Nausea And Vomiting     Projectile N/V, per patient.  Unwilling to retry therapy.       Review of Systems   Constitutional: Positive for appetite change and fatigue. Negative for chills, diaphoresis and fever.   HENT: Negative for congestion, postnasal drip, rhinorrhea, sinus pressure and sinus pain.    Respiratory: Positive for cough and shortness of breath. Negative for wheezing.    Cardiovascular: Positive for chest pain (incision site). Negative for palpitations and leg swelling.   Gastrointestinal: Negative for abdominal distention, abdominal pain, constipation, diarrhea, nausea and vomiting.   Genitourinary: Negative for decreased urine volume, difficulty urinating, dysuria and urgency.   Musculoskeletal: Positive for back pain (chronic). Negative for neck pain and neck stiffness.   Skin: Positive for pallor. Negative for rash and wound.   Allergic/Immunologic: Positive for immunocompromised state.   Neurological: Positive for weakness. Negative for dizziness, seizures, syncope and headaches.   Psychiatric/Behavioral: Negative for agitation, confusion and decreased concentration. The patient is nervous/anxious.      Objective:   Physical Exam   Constitutional: She is oriented to person, place, and time. She appears well-developed and well-nourished. She is easily aroused. No distress.   HENT:   Head: Normocephalic and atraumatic.   Nose: Nose normal.   Eyes: Conjunctivae and EOM are  normal. No scleral icterus.   Neck: Normal range of motion. Neck supple. No JVD present.   Cardiovascular: Normal rate, regular rhythm and normal heart sounds. Exam reveals no friction rub.   No murmur heard.  Pulmonary/Chest: Effort normal. No accessory muscle usage. No respiratory distress. She has decreased breath sounds in the right lower field and the left lower field. She has wheezes. She has no rhonchi. She has no rales.   Abdominal: Soft. Bowel sounds are normal. She exhibits no distension. There is no tenderness.   Musculoskeletal: Normal range of motion. She exhibits no edema.   Neurological: She is alert, oriented to person, place, and time and easily aroused.   Skin: Skin is warm and dry. She is not diaphoretic. No erythema.   Psychiatric: Judgment normal. Her mood appears not anxious. Her speech is not delayed. She is not slowed. She exhibits normal recent memory.   Nursing note and vitals reviewed.        Vital Signs (Most Recent):  Temp: 98 °F (36.7 °C) (07/12/19 0830)  Pulse: 97 (07/12/19 0858)  Resp: (!) 22 (07/12/19 0858)  BP: (!) 135/102 (07/12/19 0830)  SpO2: (!) 94 % (07/12/19 0858) Vital Signs (24h Range):  Temp:  [98 °F (36.7 °C)-99.3 °F (37.4 °C)] 98 °F (36.7 °C)  Pulse:  [77-97] 97  Resp:  [14-22] 22  SpO2:  [90 %-97 %] 94 %  BP: (132-146)/() 135/102     Weight: 58 kg (127 lb 12.8 oz)  Body mass index is 24.15 kg/m².      Intake/Output Summary (Last 24 hours) at 7/12/2019 1157  Last data filed at 7/12/2019 0530  Gross per 24 hour   Intake 980 ml   Output 1600 ml   Net -620 ml       Ventilator Data:          Hemodynamic Parameters:       Lines/Drains:       Introducer right internal jugular (Active)   Specific Qualities Capped 6/30/2019  7:10 AM   Dressing Status Clean;Dry;Biopatch in place;Intact 6/30/2019  7:10 AM   Dressing Intervention Dressing reinforced 6/29/2019  3:00 AM   Dressing Change Due 06/29/19 6/30/2019  3:00 AM   Daily Line Review Performed 6/30/2019  7:10 AM   Number of  days:             Port A Cath Single Lumen 06/24/14 1200 right subclavian (Active)   Accessed by: Radha Lanza 6/24/2019  5:00 AM   Dressing Type Transparent 6/30/2019  7:10 AM   Dressing Status Biopatch in place;Clean;Dry;Intact 6/30/2019  7:10 AM   Dressing Intervention Dressing reinforced 6/29/2019  3:00 AM   Dressing Change Due 06/30/19 6/30/2019  3:00 AM   Line Status Infusing 6/30/2019  7:10 AM   Flush Performed Yes 6/30/2019  7:10 AM   Date to be Reflushed 06/18/19 6/29/2019  3:00 AM   Daily Line Review Performed 6/30/2019  7:10 AM   Type of Needle Nicole 6/30/2019  3:00 AM   Gauge 20 6/30/2019  3:00 AM   Needle Length 1 in 6/30/2019  3:00 AM   Needle Status Left in place 6/30/2019  3:00 AM   Needle Insertion Date 06/24/19 6/30/2019  3:00 AM   Needle Insertion Time 0500 6/26/2019  3:00 AM   Number of days: 1831            Percutaneous Central Line Insertion/Assessment - double lumen  06/18/19 0510 (Active)   Dressing biopatch in place;dressing dry and intact 6/30/2019  7:10 AM   Securement secured w/ sutures 6/30/2019  7:10 AM   Additional Site Signs no erythema;no warmth 6/30/2019  7:10 AM   Distal Patency/Care infusing 6/30/2019  7:10 AM   Proximal Patency/Care infusing 6/30/2019  7:10 AM   Waveform normal 6/30/2019  7:10 AM   Line Interventions line leveled/zeroed 6/30/2019  7:10 AM   Dressing Change Due 06/29/19 6/30/2019  3:00 AM   Daily Line Review Performed 6/30/2019  7:10 AM   Number of days: 12            Peripheral IV - Single Lumen 06/27/19 0917 22 G Right Forearm (Active)   Site Assessment Clean;Dry;Intact;No redness;No swelling 6/30/2019  7:10 AM   Line Status Infusing 6/30/2019  7:10 AM   Dressing Status Clean;Dry;Intact 6/30/2019  7:10 AM   Dressing Intervention Dressing reinforced 6/29/2019  3:00 AM   Dressing Change Due 07/01/19 6/30/2019  3:00 AM   Site Change Due 07/01/19 6/29/2019  7:00 PM   Reason Not Rotated Not due 6/30/2019  7:10 AM   Number of days: 3            Chest Tube 06/29/19  1655 1 Right Fourth intercostal space;Midaxillary 7 Fr. (Active)   Chest Tube WDL WDL 6/30/2019  7:10 AM   Function -20 cm H2O 6/30/2019  7:10 AM   Air Leak/Fluctuation air leak present 6/30/2019  7:10 AM   Safety all tubing connections taped;2 rubber-tipped hemostats w/ patient;all connections secured;suction checked 6/30/2019  7:10 AM   Securement tubing secured to body distal to insertion site w/ tape 6/30/2019  7:10 AM   Patency Intervention Tip/tilt 6/30/2019  7:10 AM   Drainage Description Serosanguineous 6/30/2019  7:10 AM   Dressing Appearance occlusive gauze dressing intact 6/30/2019  7:10 AM   Dressing Care dressing changed 6/30/2019 10:00 AM   Left Subcutaneous Emphysema none present 6/30/2019  7:10 AM   Right Subcutaneous Emphysema none present 6/30/2019  7:10 AM   Site Assessment Clean;Dry;Intact;No redness;No swelling;Ecchymotic 6/30/2019 10:00 AM   Surrounding Skin Dry;Intact 6/30/2019  7:10 AM   Output (mL) 0 mL 6/30/2019 10:00 AM   Number of days: 0            Y Chest Tube 3 and 4 06/18/19 1151 3 Left Pleural 19 Fr. 4 Left Mediastinal 19 Fr. (Active)   Function -20 cm H2O 6/30/2019  7:10 AM   Air Leak/Fluctuation air leak not present 6/30/2019  7:10 AM   Safety all tubing connections taped;2 rubber-tipped hemostats w/ patient;all connections secured;suction checked 6/30/2019  7:10 AM   Securement tubing secured to body distal to insertion site w/ tape 6/30/2019  7:10 AM   Left Subcutaneous Emphysema none present 6/30/2019  7:10 AM   Right Subcutaneous Emphysema none present 6/30/2019  7:10 AM   Patency Intervention Tip/tilt 6/30/2019  7:10 AM   Drainage Description 3 Serosanguineous 6/30/2019  7:10 AM   Tube 3 Dressing Appearance occlusive gauze dressing intact 6/30/2019  7:10 AM   Tube 3 Dressing Care dressing changed 6/30/2019 10:00 AM   Site Assessment 3 Clean;Dry;Intact 6/30/2019 10:00 AM   Surrounding Skin 3 Dry;Intact 6/30/2019  7:10 AM   Drainage Description 4 Serosanguineous 6/30/2019  7:10  AM   Tube 4 Dressing Appearance occlusive gauze dressing intact 6/30/2019  7:10 AM   Tube 4 Dressing Care dressing changed 6/30/2019 10:00 AM   Site Assessment 4 Clean;Dry;Intact 6/30/2019 10:00 AM   Surrounding Skin 4 Dry;Intact 6/30/2019  7:10 AM   Output (mL) 0 mL 6/30/2019 10:00 AM   Number of days: 11       Significant Labs:  CBC:  Recent Labs   Lab 07/12/19  0500   WBC 5.56   RBC 3.66*   HGB 10.4*   HCT 34.3*      MCV 94   MCH 28.4   MCHC 30.3*     BMP:  Recent Labs   Lab 07/12/19  0500      K 4.1      CO2 26   BUN 10   CREATININE 0.8   CALCIUM 8.8      Tacrolimus Levels:  Recent Labs   Lab 07/12/19  0500   TACROLIMUS 5.3     Microbiology:  Microbiology Results (last 7 days)     Procedure Component Value Units Date/Time    Fungus culture [[id number]]  (Abnormal) Collected:  07/05/19 1445    Order Status:  Completed Specimen:  Respiratory from Bronchial Wash Updated:  07/11/19 0852     Fungus (Mycology) Culture BRNADI GLABRATA  Many      Narrative:       Bronchial Wash    Fungus culture [[id number]] Collected:  06/25/19 0900    Order Status:  Completed Specimen:  Respiratory from Bronchial Wash Updated:  07/10/19 0918     Fungus (Mycology) Culture Culture in progress      No fungus isolated after 2 weeks    Narrative:       Bronchial Wash    AFB Culture & Smear [[id number]] Collected:  07/05/19 1445    Order Status:  Completed Specimen:  Respiratory from Bronchial Wash Updated:  07/08/19 1540     AFB Culture & Smear Culture in progress     AFB CULTURE STAIN No acid fast bacilli seen.    Narrative:       Bronchial Wash    Culture, Respiratory [[id number]]  (Abnormal)  (Susceptibility) Collected:  07/05/19 1445    Order Status:  Completed Specimen:  Respiratory from Bronchial Wash Updated:  07/08/19 1100     Respiratory Culture No S aureus isolated.      PSEUDOMONAS AERUGINOSA   Moderate  Normal respiratory anibal also present       Gram Stain (Respiratory) Few WBC's     Gram Stain (Respiratory)  Few Gram positive rods     Gram Stain (Respiratory) Few budding yeast    Narrative:       Bronchial Wash          I have reviewed all pertinent labs within the past 24 hours.    Diagnostic Results:  Chest X-Ray: Vascular catheters, right pleural drain and monitoring leads remain.  There is some high-density material in the upper abdomen likely from the modified barium swallow.  Heart is enlarged there is mild increase in the pulmonary vascular markings.  Persistent parenchymal opacification bilaterally.  No significant pneumothorax seen.  Probable right pleural effusion.

## 2019-07-13 NOTE — CARE UPDATE
BG goal 140-180     Remains in [facility], [facility]  BG mildly elevated yesterday afternoon  Prednisone 20 mg daily  On IV antibiotics     Continue:  Decrease Novolog to 3-5 units with meals  Novolog 3 units PRN snack dose  Moderate dose correction scale  BG monitoring AC/HS     Endocrine to continue to follow     ** Please call Endocrine for any BG related issues **

## 2019-07-13 NOTE — SUBJECTIVE & OBJECTIVE
Subjective:     Interval History: No acute events overnight. Refused PT yesterday due to fatigue. Resting comfortably on AM rounds.       Continuous Infusions:    Scheduled Meds:   ceftolozane-tazobactam  1,500 mg Intravenous Q8H    cetirizine  10 mg Oral Daily    dapsone  100 mg Oral Daily    enoxaparin  40 mg Subcutaneous Daily    gabapentin  300 mg Oral TID    insulin aspart U-100  3-5 Units Subcutaneous TIDWM    levalbuterol  1.25 mg Nebulization Q6H WAKE    lipase-protease-amylase 24,000-76,000-120,000 units  4 capsule Oral TID WM    metoprolol tartrate  25 mg Oral TID    micafungin (MYCAMINE) IVPB  100 mg Intravenous Q24H    morphine  15 mg Oral Q12H    mycophenolate  500 mg Oral BID    predniSONE  20 mg Oral Daily    QUEtiapine  50 mg Oral QHS    sodium chloride 7%  4 mL Nebulization Q12H    tacrolimus  0.5 mg Oral BID    valGANciclovir  450 mg Oral BID    venlafaxine  150 mg Oral QHS    venlafaxine  37.5 mg Oral QHS    voriconazole (VFEND) IVPB (wt < 83 kg)  4 mg/kg Intravenous Q12H     PRN Meds:acetaminophen, bisacodyl, Dextrose 10% Bolus, Dextrose 10% Bolus, diphenhydrAMINE, glucagon (human recombinant), glucose, glucose, insulin aspart U-100, insulin aspart U-100, lactulose, levalbuterol, lipase-protease-amylase 24,000-76,000-120,000 units, LORazepam, magnesium sulfate IVPB, metoclopramide HCl, metoprolol, naloxone, ondansetron, ondansetron, oxyCODONE, potassium chloride in water **AND** potassium chloride in water **AND** potassium chloride in water    Review of patient's allergies indicates:   Allergen Reactions    Albuterol Palpitations    Colistin Anaphylaxis    Vancomycin analogues      Infusion reaction that does not resolve with slowing  Pt. States she can tolerate it when given with 50 mg Benadryl and ran over 3 hours    Neupogen [filgrastim] Other (See Comments)     Ostealgia after five daily doses of 300 mcg.      Bactrim [sulfamethoxazole-trimethoprim] Hives     Ceftazidime Hives     Pt stated can tolerate cefapine not ceftazidime    Ceftazidime     Dronabinol Other (See Comments)     Mental changes/hallucinations    Haldol [haloperidol lactate] Other (See Comments)     Seizure like activity    Nsaids (non-steroidal anti-inflammatory drug)      Cannot have due to lung transplant    Adhesive Rash     Cloth tape- please use tegaderm or paper tape    Aztreonam Rash    Ciprofloxacin Nausea And Vomiting     Projectile N/V, per patient.  Unwilling to retry therapy.       Review of Systems   Constitutional: Positive for appetite change and fatigue. Negative for chills, diaphoresis and fever.   HENT: Negative for congestion, postnasal drip, rhinorrhea, sinus pressure and sinus pain.    Respiratory: Positive for cough and shortness of breath. Negative for wheezing.    Cardiovascular: Positive for chest pain (incision site). Negative for palpitations and leg swelling.   Gastrointestinal: Negative for abdominal distention, abdominal pain, constipation, diarrhea, nausea and vomiting.   Genitourinary: Negative for decreased urine volume, difficulty urinating, dysuria and urgency.   Musculoskeletal: Positive for back pain (chronic). Negative for neck pain and neck stiffness.   Skin: Positive for pallor. Negative for rash and wound.   Allergic/Immunologic: Positive for immunocompromised state.   Neurological: Positive for weakness. Negative for dizziness, seizures, syncope and headaches.   Psychiatric/Behavioral: Negative for agitation, confusion and decreased concentration. The patient is nervous/anxious.      Objective:   Physical Exam   Constitutional: She is oriented to person, place, and time. She appears well-developed and well-nourished. She is easily aroused. No distress.   HENT:   Head: Normocephalic and atraumatic.   Nose: Nose normal.   Eyes: Conjunctivae and EOM are normal. No scleral icterus.   Neck: Normal range of motion. Neck supple. No JVD present.    Cardiovascular: Normal rate, regular rhythm and normal heart sounds. Exam reveals no friction rub.   No murmur heard.  Pulmonary/Chest: Effort normal. No accessory muscle usage. No respiratory distress. She has decreased breath sounds in the right lower field and the left lower field. She has wheezes. She has no rhonchi. She has no rales.   Abdominal: Soft. Bowel sounds are normal. She exhibits no distension. There is no tenderness.   Musculoskeletal: Normal range of motion. She exhibits no edema.   Neurological: She is alert, oriented to person, place, and time and easily aroused.   Skin: Skin is warm and dry. She is not diaphoretic. No erythema.   Psychiatric: Judgment normal. Her mood appears not anxious. Her speech is not delayed. She is not slowed. She exhibits normal recent memory.   Nursing note and vitals reviewed.        Vital Signs (Most Recent):  Temp: 98.3 °F (36.8 °C) (07/12/19 1528)  Pulse: 88 (07/13/19 0415)  Resp: 14 (07/13/19 0415)  BP: (!) 126/90 (07/13/19 0415)  SpO2: (!) 94 % (07/13/19 0415) Vital Signs (24h Range):  Temp:  [98 °F (36.7 °C)-98.3 °F (36.8 °C)] 98.3 °F (36.8 °C)  Pulse:  [79-97] 88  Resp:  [14-22] 14  SpO2:  [91 %-95 %] 94 %  BP: (126-147)/() 126/90     Weight: 53.7 kg (118 lb 6.2 oz)  Body mass index is 22.37 kg/m².      Intake/Output Summary (Last 24 hours) at 7/13/2019 0739  Last data filed at 7/13/2019 0429  Gross per 24 hour   Intake 760 ml   Output --   Net 760 ml       Ventilator Data:          Hemodynamic Parameters:       Lines/Drains:       Introducer right internal jugular (Active)   Specific Qualities Capped 6/30/2019  7:10 AM   Dressing Status Clean;Dry;Biopatch in place;Intact 6/30/2019  7:10 AM   Dressing Intervention Dressing reinforced 6/29/2019  3:00 AM   Dressing Change Due 06/29/19 6/30/2019  3:00 AM   Daily Line Review Performed 6/30/2019  7:10 AM   Number of days:             Port A Cath Single Lumen 06/24/14 1200 right subclavian (Active)   Accessed  by: Radha Lanza 6/24/2019  5:00 AM   Dressing Type Transparent 6/30/2019  7:10 AM   Dressing Status Biopatch in place;Clean;Dry;Intact 6/30/2019  7:10 AM   Dressing Intervention Dressing reinforced 6/29/2019  3:00 AM   Dressing Change Due 06/30/19 6/30/2019  3:00 AM   Line Status Infusing 6/30/2019  7:10 AM   Flush Performed Yes 6/30/2019  7:10 AM   Date to be Reflushed 06/18/19 6/29/2019  3:00 AM   Daily Line Review Performed 6/30/2019  7:10 AM   Type of Needle Nicole 6/30/2019  3:00 AM   Gauge 20 6/30/2019  3:00 AM   Needle Length 1 in 6/30/2019  3:00 AM   Needle Status Left in place 6/30/2019  3:00 AM   Needle Insertion Date 06/24/19 6/30/2019  3:00 AM   Needle Insertion Time 0500 6/26/2019  3:00 AM   Number of days: 1831            Percutaneous Central Line Insertion/Assessment - double lumen  06/18/19 0510 (Active)   Dressing biopatch in place;dressing dry and intact 6/30/2019  7:10 AM   Securement secured w/ sutures 6/30/2019  7:10 AM   Additional Site Signs no erythema;no warmth 6/30/2019  7:10 AM   Distal Patency/Care infusing 6/30/2019  7:10 AM   Proximal Patency/Care infusing 6/30/2019  7:10 AM   Waveform normal 6/30/2019  7:10 AM   Line Interventions line leveled/zeroed 6/30/2019  7:10 AM   Dressing Change Due 06/29/19 6/30/2019  3:00 AM   Daily Line Review Performed 6/30/2019  7:10 AM   Number of days: 12            Peripheral IV - Single Lumen 06/27/19 0917 22 G Right Forearm (Active)   Site Assessment Clean;Dry;Intact;No redness;No swelling 6/30/2019  7:10 AM   Line Status Infusing 6/30/2019  7:10 AM   Dressing Status Clean;Dry;Intact 6/30/2019  7:10 AM   Dressing Intervention Dressing reinforced 6/29/2019  3:00 AM   Dressing Change Due 07/01/19 6/30/2019  3:00 AM   Site Change Due 07/01/19 6/29/2019  7:00 PM   Reason Not Rotated Not due 6/30/2019  7:10 AM   Number of days: 3            Chest Tube 06/29/19 1655 1 Right Fourth intercostal space;Midaxillary 7 Fr. (Active)   Chest Tube WDL WDL 6/30/2019   7:10 AM   Function -20 cm H2O 6/30/2019  7:10 AM   Air Leak/Fluctuation air leak present 6/30/2019  7:10 AM   Safety all tubing connections taped;2 rubber-tipped hemostats w/ patient;all connections secured;suction checked 6/30/2019  7:10 AM   Securement tubing secured to body distal to insertion site w/ tape 6/30/2019  7:10 AM   Patency Intervention Tip/tilt 6/30/2019  7:10 AM   Drainage Description Serosanguineous 6/30/2019  7:10 AM   Dressing Appearance occlusive gauze dressing intact 6/30/2019  7:10 AM   Dressing Care dressing changed 6/30/2019 10:00 AM   Left Subcutaneous Emphysema none present 6/30/2019  7:10 AM   Right Subcutaneous Emphysema none present 6/30/2019  7:10 AM   Site Assessment Clean;Dry;Intact;No redness;No swelling;Ecchymotic 6/30/2019 10:00 AM   Surrounding Skin Dry;Intact 6/30/2019  7:10 AM   Output (mL) 0 mL 6/30/2019 10:00 AM   Number of days: 0            Y Chest Tube 3 and 4 06/18/19 1151 3 Left Pleural 19 Fr. 4 Left Mediastinal 19 Fr. (Active)   Function -20 cm H2O 6/30/2019  7:10 AM   Air Leak/Fluctuation air leak not present 6/30/2019  7:10 AM   Safety all tubing connections taped;2 rubber-tipped hemostats w/ patient;all connections secured;suction checked 6/30/2019  7:10 AM   Securement tubing secured to body distal to insertion site w/ tape 6/30/2019  7:10 AM   Left Subcutaneous Emphysema none present 6/30/2019  7:10 AM   Right Subcutaneous Emphysema none present 6/30/2019  7:10 AM   Patency Intervention Tip/tilt 6/30/2019  7:10 AM   Drainage Description 3 Serosanguineous 6/30/2019  7:10 AM   Tube 3 Dressing Appearance occlusive gauze dressing intact 6/30/2019  7:10 AM   Tube 3 Dressing Care dressing changed 6/30/2019 10:00 AM   Site Assessment 3 Clean;Dry;Intact 6/30/2019 10:00 AM   Surrounding Skin 3 Dry;Intact 6/30/2019  7:10 AM   Drainage Description 4 Serosanguineous 6/30/2019  7:10 AM   Tube 4 Dressing Appearance occlusive gauze dressing intact 6/30/2019  7:10 AM   Tube 4  Dressing Care dressing changed 6/30/2019 10:00 AM   Site Assessment 4 Clean;Dry;Intact 6/30/2019 10:00 AM   Surrounding Skin 4 Dry;Intact 6/30/2019  7:10 AM   Output (mL) 0 mL 6/30/2019 10:00 AM   Number of days: 11       Significant Labs:  CBC:  Recent Labs   Lab 07/13/19  0600   WBC 4.08   RBC 3.44*   HGB 9.8*   HCT 32.1*   *   MCV 93   MCH 28.5   MCHC 30.5*     BMP:  Recent Labs   Lab 07/13/19  0600      K 3.5      CO2 25   BUN 11   CREATININE 0.9   CALCIUM 8.4*      Tacrolimus Levels:  Recent Labs   Lab 07/12/19  0500   TACROLIMUS 5.3     Microbiology:  Microbiology Results (last 7 days)     Procedure Component Value Units Date/Time    Fungus culture [[id number]]  (Abnormal) Collected:  07/05/19 1445    Order Status:  Completed Specimen:  Respiratory from Bronchial Wash Updated:  07/11/19 0852     Fungus (Mycology) Culture BRANDI GLABRATA  Many      Narrative:       Bronchial Wash    Fungus culture [[id number]] Collected:  06/25/19 0900    Order Status:  Completed Specimen:  Respiratory from Bronchial Wash Updated:  07/10/19 0918     Fungus (Mycology) Culture Culture in progress      No fungus isolated after 2 weeks    Narrative:       Bronchial Wash    AFB Culture & Smear [[id number]] Collected:  07/05/19 1445    Order Status:  Completed Specimen:  Respiratory from Bronchial Wash Updated:  07/08/19 1540     AFB Culture & Smear Culture in progress     AFB CULTURE STAIN No acid fast bacilli seen.    Narrative:       Bronchial Wash    Culture, Respiratory [[id number]]  (Abnormal)  (Susceptibility) Collected:  07/05/19 1445    Order Status:  Completed Specimen:  Respiratory from Bronchial Wash Updated:  07/08/19 1100     Respiratory Culture No S aureus isolated.      PSEUDOMONAS AERUGINOSA   Moderate  Normal respiratory anibal also present       Gram Stain (Respiratory) Few WBC's     Gram Stain (Respiratory) Few Gram positive rods     Gram Stain (Respiratory) Few budding yeast    Narrative:        Bronchial Wash          I have reviewed all pertinent labs within the past 24 hours.    Diagnostic Results:  Chest X-Ray: Vascular catheters, right pleural drain and monitoring leads remain.  There is some high-density material in the upper abdomen likely from the modified barium swallow.  Heart is enlarged there is mild increase in the pulmonary vascular markings.  Persistent parenchymal opacification bilaterally.  No significant pneumothorax seen.  Probable right pleural effusion.

## 2019-07-13 NOTE — PLAN OF CARE
Problem: Adult Inpatient Plan of Care  Goal: Plan of Care Review  Outcome: Ongoing (interventions implemented as appropriate)    - AAOx4. VSS. Afebrile.  - R SC port/ L permacath, CDI.  - Prn zofran/benadryl/oxy given.  - Contact precautions maintained:  infection.  - Parminder lung incision KENNETH dermabond.  - CXR this am. See flowsheets.  - Mag rider given. Mag 1.5.  - C/o diarrhea. Imodium ordered prn. Up to BSC w assistance.  - IV zerbaxa/micafungin continued per orders.  - Daniel thick diet. ACHS. Meal time and SSI given.  -  @ bedside. Bed low and locked, call bell within reach, side rails x2.  - In NAD. Will continue to monitor.

## 2019-07-13 NOTE — PT/OT/SLP PROGRESS
"Physical Therapy      Patient Name:  Juanita Ibarra   MRN:  [id number]    Patient not seen today secondary to Toileting(Pt toileting on attempt with c/o of "upset stomach" and increased time needed.  Pt in bathroom with significant other reporting pt was able to ambulate with HHA the last 2 days. PT to follow up per POC as appropriate to assess pts progression. ).    Philip Medrano, PTA    "

## 2019-07-13 NOTE — ASSESSMENT & PLAN NOTE
POD#25 s/p bilateral lung transplant (re-transplant) for CARLOS EDUARDO. 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 PGD 0. S/p bronch and extubated to HFNC on POD#2, but was re-intubated 6/21 for hypercapnic respiratory failure. Bronchoscopy 7/5 with  pseudomonas.     Continue PT/OT/CPT/nebs/IS. Continue immunosuppression and prophylaxis. PA/lateral CXR today.

## 2019-07-13 NOTE — PROGRESS NOTES
[facility]  Lung Transplant  Progress Note - Floor    Patient Name: Juanita Ibarra  MRN: [id number]  Admission Date: 6/17/2019  Hospital Length of Stay: 25 days  Post-Operative Day: 1857 (Lung), 25 (Lung)  Attending Physician: Jake Alvarez MD  Primary Care Provider: Primary Doctor No     Subjective:     Interval History: No acute events overnight. Refused PT yesterday due to fatigue. Resting comfortably on AM rounds.       Continuous Infusions:    Scheduled Meds:   ceftolozane-tazobactam  1,500 mg Intravenous Q8H    cetirizine  10 mg Oral Daily    dapsone  100 mg Oral Daily    enoxaparin  40 mg Subcutaneous Daily    gabapentin  300 mg Oral TID    insulin aspart U-100  3-5 Units Subcutaneous TIDWM    levalbuterol  1.25 mg Nebulization Q6H WAKE    lipase-protease-amylase 24,000-76,000-120,000 units  4 capsule Oral TID WM    metoprolol tartrate  25 mg Oral TID    micafungin (MYCAMINE) IVPB  100 mg Intravenous Q24H    morphine  15 mg Oral Q12H    mycophenolate  500 mg Oral BID    predniSONE  20 mg Oral Daily    QUEtiapine  50 mg Oral QHS    sodium chloride 7%  4 mL Nebulization Q12H    tacrolimus  0.5 mg Oral BID    valGANciclovir  450 mg Oral BID    venlafaxine  150 mg Oral QHS    venlafaxine  37.5 mg Oral QHS    voriconazole (VFEND) IVPB (wt < 83 kg)  4 mg/kg Intravenous Q12H     PRN Meds:acetaminophen, bisacodyl, Dextrose 10% Bolus, Dextrose 10% Bolus, diphenhydrAMINE, glucagon (human recombinant), glucose, glucose, insulin aspart U-100, insulin aspart U-100, lactulose, levalbuterol, lipase-protease-amylase 24,000-76,000-120,000 units, LORazepam, magnesium sulfate IVPB, metoclopramide HCl, metoprolol, naloxone, ondansetron, ondansetron, oxyCODONE, potassium chloride in water **AND** potassium chloride in water **AND** potassium chloride in water    Review of patient's allergies indicates:   Allergen Reactions    Albuterol Palpitations    Colistin Anaphylaxis     Vancomycin analogues      Infusion reaction that does not resolve with slowing  Pt. States she can tolerate it when given with 50 mg Benadryl and ran over 3 hours    Neupogen [filgrastim] Other (See Comments)     Ostealgia after five daily doses of 300 mcg.      Bactrim [sulfamethoxazole-trimethoprim] Hives    Ceftazidime Hives     Pt stated can tolerate cefapine not ceftazidime    Ceftazidime     Dronabinol Other (See Comments)     Mental changes/hallucinations    Haldol [haloperidol lactate] Other (See Comments)     Seizure like activity    Nsaids (non-steroidal anti-inflammatory drug)      Cannot have due to lung transplant    Adhesive Rash     Cloth tape- please use tegaderm or paper tape    Aztreonam Rash    Ciprofloxacin Nausea And Vomiting     Projectile N/V, per patient.  Unwilling to retry therapy.       Review of Systems   Constitutional: Positive for appetite change and fatigue. Negative for chills, diaphoresis and fever.   HENT: Negative for congestion, postnasal drip, rhinorrhea, sinus pressure and sinus pain.    Respiratory: Positive for cough and shortness of breath. Negative for wheezing.    Cardiovascular: Positive for chest pain (incision site). Negative for palpitations and leg swelling.   Gastrointestinal: Negative for abdominal distention, abdominal pain, constipation, diarrhea, nausea and vomiting.   Genitourinary: Negative for decreased urine volume, difficulty urinating, dysuria and urgency.   Musculoskeletal: Positive for back pain (chronic). Negative for neck pain and neck stiffness.   Skin: Positive for pallor. Negative for rash and wound.   Allergic/Immunologic: Positive for immunocompromised state.   Neurological: Positive for weakness. Negative for dizziness, seizures, syncope and headaches.   Psychiatric/Behavioral: Negative for agitation, confusion and decreased concentration. The patient is nervous/anxious.      Objective:   Physical Exam   Constitutional: She is oriented  to person, place, and time. She appears well-developed and well-nourished. She is easily aroused. No distress.   HENT:   Head: Normocephalic and atraumatic.   Nose: Nose normal.   Eyes: Conjunctivae and EOM are normal. No scleral icterus.   Neck: Normal range of motion. Neck supple. No JVD present.   Cardiovascular: Normal rate, regular rhythm and normal heart sounds. Exam reveals no friction rub.   No murmur heard.  Pulmonary/Chest: Effort normal. No accessory muscle usage. No respiratory distress. She has decreased breath sounds in the right lower field and the left lower field. She has wheezes. She has no rhonchi. She has no rales.   Abdominal: Soft. Bowel sounds are normal. She exhibits no distension. There is no tenderness.   Musculoskeletal: Normal range of motion. She exhibits no edema.   Neurological: She is alert, oriented to person, place, and time and easily aroused.   Skin: Skin is warm and dry. She is not diaphoretic. No erythema.   Psychiatric: Judgment normal. Her mood appears not anxious. Her speech is not delayed. She is not slowed. She exhibits normal recent memory.   Nursing note and vitals reviewed.        Vital Signs (Most Recent):  Temp: 98.3 °F (36.8 °C) (07/12/19 1528)  Pulse: 88 (07/13/19 0415)  Resp: 14 (07/13/19 0415)  BP: (!) 126/90 (07/13/19 0415)  SpO2: (!) 94 % (07/13/19 0415) Vital Signs (24h Range):  Temp:  [98 °F (36.7 °C)-98.3 °F (36.8 °C)] 98.3 °F (36.8 °C)  Pulse:  [79-97] 88  Resp:  [14-22] 14  SpO2:  [91 %-95 %] 94 %  BP: (126-147)/() 126/90     Weight: 53.7 kg (118 lb 6.2 oz)  Body mass index is 22.37 kg/m².      Intake/Output Summary (Last 24 hours) at 7/13/2019 5710  Last data filed at 7/13/2019 0429  Gross per 24 hour   Intake 760 ml   Output --   Net 760 ml       Ventilator Data:          Hemodynamic Parameters:       Lines/Drains:       Introducer right internal jugular (Active)   Specific Qualities Capped 6/30/2019  7:10 AM   Dressing Status Clean;Dry;Biopatch in  place;Intact 6/30/2019  7:10 AM   Dressing Intervention Dressing reinforced 6/29/2019  3:00 AM   Dressing Change Due 06/29/19 6/30/2019  3:00 AM   Daily Line Review Performed 6/30/2019  7:10 AM   Number of days:             Port A Cath Single Lumen 06/24/14 1200 right subclavian (Active)   Accessed by: Radha Lanza 6/24/2019  5:00 AM   Dressing Type Transparent 6/30/2019  7:10 AM   Dressing Status Biopatch in place;Clean;Dry;Intact 6/30/2019  7:10 AM   Dressing Intervention Dressing reinforced 6/29/2019  3:00 AM   Dressing Change Due 06/30/19 6/30/2019  3:00 AM   Line Status Infusing 6/30/2019  7:10 AM   Flush Performed Yes 6/30/2019  7:10 AM   Date to be Reflushed 06/18/19 6/29/2019  3:00 AM   Daily Line Review Performed 6/30/2019  7:10 AM   Type of Needle Nicole 6/30/2019  3:00 AM   Gauge 20 6/30/2019  3:00 AM   Needle Length 1 in 6/30/2019  3:00 AM   Needle Status Left in place 6/30/2019  3:00 AM   Needle Insertion Date 06/24/19 6/30/2019  3:00 AM   Needle Insertion Time 0500 6/26/2019  3:00 AM   Number of days: 1831            Percutaneous Central Line Insertion/Assessment - double lumen  06/18/19 0510 (Active)   Dressing biopatch in place;dressing dry and intact 6/30/2019  7:10 AM   Securement secured w/ sutures 6/30/2019  7:10 AM   Additional Site Signs no erythema;no warmth 6/30/2019  7:10 AM   Distal Patency/Care infusing 6/30/2019  7:10 AM   Proximal Patency/Care infusing 6/30/2019  7:10 AM   Waveform normal 6/30/2019  7:10 AM   Line Interventions line leveled/zeroed 6/30/2019  7:10 AM   Dressing Change Due 06/29/19 6/30/2019  3:00 AM   Daily Line Review Performed 6/30/2019  7:10 AM   Number of days: 12            Peripheral IV - Single Lumen 06/27/19 0917 22 G Right Forearm (Active)   Site Assessment Clean;Dry;Intact;No redness;No swelling 6/30/2019  7:10 AM   Line Status Infusing 6/30/2019  7:10 AM   Dressing Status Clean;Dry;Intact 6/30/2019  7:10 AM   Dressing Intervention Dressing reinforced 6/29/2019   3:00 AM   Dressing Change Due 07/01/19 6/30/2019  3:00 AM   Site Change Due 07/01/19 6/29/2019  7:00 PM   Reason Not Rotated Not due 6/30/2019  7:10 AM   Number of days: 3            Chest Tube 06/29/19 1655 1 Right Fourth intercostal space;Midaxillary 7 Fr. (Active)   Chest Tube WDL WDL 6/30/2019  7:10 AM   Function -20 cm H2O 6/30/2019  7:10 AM   Air Leak/Fluctuation air leak present 6/30/2019  7:10 AM   Safety all tubing connections taped;2 rubber-tipped hemostats w/ patient;all connections secured;suction checked 6/30/2019  7:10 AM   Securement tubing secured to body distal to insertion site w/ tape 6/30/2019  7:10 AM   Patency Intervention Tip/tilt 6/30/2019  7:10 AM   Drainage Description Serosanguineous 6/30/2019  7:10 AM   Dressing Appearance occlusive gauze dressing intact 6/30/2019  7:10 AM   Dressing Care dressing changed 6/30/2019 10:00 AM   Left Subcutaneous Emphysema none present 6/30/2019  7:10 AM   Right Subcutaneous Emphysema none present 6/30/2019  7:10 AM   Site Assessment Clean;Dry;Intact;No redness;No swelling;Ecchymotic 6/30/2019 10:00 AM   Surrounding Skin Dry;Intact 6/30/2019  7:10 AM   Output (mL) 0 mL 6/30/2019 10:00 AM   Number of days: 0            Y Chest Tube 3 and 4 06/18/19 1151 3 Left Pleural 19 Fr. 4 Left Mediastinal 19 Fr. (Active)   Function -20 cm H2O 6/30/2019  7:10 AM   Air Leak/Fluctuation air leak not present 6/30/2019  7:10 AM   Safety all tubing connections taped;2 rubber-tipped hemostats w/ patient;all connections secured;suction checked 6/30/2019  7:10 AM   Securement tubing secured to body distal to insertion site w/ tape 6/30/2019  7:10 AM   Left Subcutaneous Emphysema none present 6/30/2019  7:10 AM   Right Subcutaneous Emphysema none present 6/30/2019  7:10 AM   Patency Intervention Tip/tilt 6/30/2019  7:10 AM   Drainage Description 3 Serosanguineous 6/30/2019  7:10 AM   Tube 3 Dressing Appearance occlusive gauze dressing intact 6/30/2019  7:10 AM   Tube 3 Dressing  Care dressing changed 6/30/2019 10:00 AM   Site Assessment 3 Clean;Dry;Intact 6/30/2019 10:00 AM   Surrounding Skin 3 Dry;Intact 6/30/2019  7:10 AM   Drainage Description 4 Serosanguineous 6/30/2019  7:10 AM   Tube 4 Dressing Appearance occlusive gauze dressing intact 6/30/2019  7:10 AM   Tube 4 Dressing Care dressing changed 6/30/2019 10:00 AM   Site Assessment 4 Clean;Dry;Intact 6/30/2019 10:00 AM   Surrounding Skin 4 Dry;Intact 6/30/2019  7:10 AM   Output (mL) 0 mL 6/30/2019 10:00 AM   Number of days: 11       Significant Labs:  CBC:  Recent Labs   Lab 07/13/19  0600   WBC 4.08   RBC 3.44*   HGB 9.8*   HCT 32.1*   *   MCV 93   MCH 28.5   MCHC 30.5*     BMP:  Recent Labs   Lab 07/13/19  0600      K 3.5      CO2 25   BUN 11   CREATININE 0.9   CALCIUM 8.4*      Tacrolimus Levels:  Recent Labs   Lab 07/12/19  0500   TACROLIMUS 5.3     Microbiology:  Microbiology Results (last 7 days)     Procedure Component Value Units Date/Time    Fungus culture [[id number]]  (Abnormal) Collected:  07/05/19 1445    Order Status:  Completed Specimen:  Respiratory from Bronchial Wash Updated:  07/11/19 0852     Fungus (Mycology) Culture BRANDI GLABRATA  Many      Narrative:       Bronchial Wash    Fungus culture [[id number]] Collected:  06/25/19 0900    Order Status:  Completed Specimen:  Respiratory from Bronchial Wash Updated:  07/10/19 0918     Fungus (Mycology) Culture Culture in progress      No fungus isolated after 2 weeks    Narrative:       Bronchial Wash    AFB Culture & Smear [[id number]] Collected:  07/05/19 1445    Order Status:  Completed Specimen:  Respiratory from Bronchial Wash Updated:  07/08/19 1540     AFB Culture & Smear Culture in progress     AFB CULTURE STAIN No acid fast bacilli seen.    Narrative:       Bronchial Wash    Culture, Respiratory [[id number]]  (Abnormal)  (Susceptibility) Collected:  07/05/19 1445    Order Status:  Completed Specimen:  Respiratory from Bronchial Wash Updated:   07/08/19 1100     Respiratory Culture No S aureus isolated.      PSEUDOMONAS AERUGINOSA   Moderate  Normal respiratory anibal also present       Gram Stain (Respiratory) Few WBC's     Gram Stain (Respiratory) Few Gram positive rods     Gram Stain (Respiratory) Few budding yeast    Narrative:       Bronchial Wash          I have reviewed all pertinent labs within the past 24 hours.    Diagnostic Results:  Chest X-Ray: Vascular catheters, right pleural drain and monitoring leads remain.  There is some high-density material in the upper abdomen likely from the modified barium swallow.  Heart is enlarged there is mild increase in the pulmonary vascular markings.  Persistent parenchymal opacification bilaterally.  No significant pneumothorax seen.  Probable right pleural effusion.           Assessment/Plan:     * S/P lung transplant  POD#25 s/p bilateral lung transplant (re-transplant) for CARLOS EDUARDO. 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 PGD 0. S/p bronch and extubated to HFNC on POD#2, but was re-intubated 6/21 for hypercapnic respiratory failure. Bronchoscopy 7/5 with  pseudomonas.     Continue PT/OT/CPT/nebs/IS. Continue immunosuppression and prophylaxis. PA/lateral CXR today.    Immunosuppression  Previously on tacrolimus, MMF, and daily prednisone. S/p solumedrol in OR, basiliximab on POD#0 and POD#4. Continue MMF and steroid taper. Will resume tacrolimus 0.5 mg BID.  Monitor daily tacrolimus levels and adjust dose as needed.       Prophylactic antibiotic  CMV D-/R+. Received Vanc/Merrem as routine surgical prophylaxis, which was transitioned to Cipro. Continue OIP with valganciclovir and dapsone. Continue micafungin for Candida glabrata. Continue voriconazole for aspergillus.    Acute blood loss anemia  Received 6u PRBCs, 2u plts, 1 cryo, and 4 FFP angie-operatively. Will continue to monitor and be conservative with future transfusions.     Anxiety disorder  Continue home dose of Effexor and Ativan. Psych consulted  for assistance, appreciate recs.    Chronic pain with opiate use  Transitioned to home regimen of MS Contin 15 mg BID.  Oxy 10 mg Q6hrs PRN. Continue to closely monitor status. Limit sedating medications. Ensure patient is fully alert and awake prior to eating to avoid risk of aspiration.     CF related Pancreatic insufficiency  Continue Creon with meals and snacks.     Aspergillus  Positive aspergillus ag from BAL on 7/5. Continue voriconazole.    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 Received Tobramycin x1 on 6/22 and was transitioned from Merrem to Cipro. BAL from 7/5 with  Pseudomonas. Started on Zerbaxa 7/9. ID following, appreciate recs.     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 Repeat BAL on 6/23 with Candida glabrata. Continue micafungin per ID recs.    Adrenal cortical steroids causing adverse effect in therapeutic use  Endocrinology consulted, appreciate recs.        Shama Canales PA-C  Lung Transplant  [facility]

## 2019-07-14 NOTE — ASSESSMENT & PLAN NOTE
POD#26 s/p bilateral lung transplant (re-transplant) for CARLOS EDUARDO. 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 PGD 0. S/p bronch and extubated to HFNC on POD#2, but was re-intubated 6/21 for hypercapnic respiratory failure. Bronchoscopy 7/5 with  pseudomonas.     Continue PT/OT/CPT/nebs/IS. Continue immunosuppression and prophylaxis.

## 2019-07-14 NOTE — PLAN OF CARE
Problem: Adult Inpatient Plan of Care  Goal: Plan of Care Review  Outcome: Ongoing (interventions implemented as appropriate)       - AAOx4. VSS. Afebrile.  - R SC port/ L permacath, CDI.  - Prn zofran/benadryl/oxy/ativan given.  - Contact precautions maintained:  infection.  - Parminder lung incision KENNETH dermabond.  - Mag rider given. Mag 1.8.  - C/o diarrhea yest. Improved with prn imodium.  - IV zerbaxa/micafungin continued per orders.  - [place] thick diet. ACHS. Meal time and SSI given.  -  @ bedside. Bed low and locked, call bell within reach, side rails x2.  - In NAD. Will continue to monitor.

## 2019-07-14 NOTE — ASSESSMENT & PLAN NOTE
Transitioned to home regimen of MS Contin 15 mg BID.  Oxy 10 mg Q6hrs PRN. Limit sedating medications.

## 2019-07-14 NOTE — CARE UPDATE
G goal 140-180     Remains in TSU, NAEON  BG well controlled on current insulin regimen  Prednisone 20 mg daily  On IV antibiotics     Continue:  Novolog 3-5 units with meals  Novolog 3 units PRN snack dose  Moderate dose correction scale  BG monitoring AC/HS     Endocrine to continue to follow     ** Please call Endocrine for any BG related issues **

## 2019-07-14 NOTE — CARE UPDATE
RT HAS ADVISED PT THAT SINCE SHE HAS BEEN REFUSING FULL COURSE OF TX SINCE AT LEAST 2030 YESTERDAY THAT SHE COULD BENEFIT FROM TAKING THEM NOW; PT STILL REFUSES TX.

## 2019-07-14 NOTE — PROGRESS NOTES
[facility]  Lung Transplant  Progress Note - Floor    Patient Name: Juanita Ibarra  MRN: [id number]  Admission Date: 6/17/2019  Hospital Length of Stay: 26 days  Post-Operative Day: 1858 (Lung), 26 (Lung)  Attending Physician: Jake Alvarez MD  Primary Care Provider: Primary Doctor No     Subjective:     Interval History: No acute events overnight. Has been refusing most hypertonic saline nebs and PT. Continues to have loose, non-watery BMs. Non-compliant with IS use. She otherwise feels well today.       Continuous Infusions:    Scheduled Meds:   ceftolozane-tazobactam  1,500 mg Intravenous Q8H    cetirizine  10 mg Oral Daily    dapsone  100 mg Oral Daily    enoxaparin  40 mg Subcutaneous Daily    gabapentin  300 mg Oral TID    insulin aspart U-100  2-5 Units Subcutaneous TIDWM    levalbuterol  1.25 mg Nebulization Q6H WAKE    lipase-protease-amylase 24,000-76,000-120,000 units  6 capsule Oral TID WM    metoprolol tartrate  25 mg Oral TID    micafungin (MYCAMINE) IVPB  100 mg Intravenous Q24H    morphine  15 mg Oral Q12H    mycophenolate  500 mg Oral BID    predniSONE  20 mg Oral Daily    QUEtiapine  50 mg Oral QHS    sodium chloride 7%  4 mL Nebulization Q12H    tacrolimus  0.5 mg Oral BID    valGANciclovir  450 mg Oral BID    venlafaxine  150 mg Oral QHS    venlafaxine  37.5 mg Oral QHS    voriconazole (VFEND) IVPB (wt < 83 kg)  4 mg/kg Intravenous Q12H     PRN Meds:acetaminophen, bisacodyl, Dextrose 10% Bolus, Dextrose 10% Bolus, diphenhydrAMINE, glucagon (human recombinant), glucose, glucose, insulin aspart U-100, insulin aspart U-100, lactulose, levalbuterol, lipase-protease-amylase 24,000-76,000-120,000 units, loperamide, LORazepam, magnesium sulfate IVPB, metoclopramide HCl, metoprolol, naloxone, ondansetron, ondansetron, oxyCODONE, potassium chloride in water **AND** potassium chloride in water **AND** potassium chloride in water    Review of patient's  allergies indicates:   Allergen Reactions    Albuterol Palpitations    Colistin Anaphylaxis    Vancomycin analogues      Infusion reaction that does not resolve with slowing  Pt. States she can tolerate it when given with 50 mg Benadryl and ran over 3 hours    Neupogen [filgrastim] Other (See Comments)     Ostealgia after five daily doses of 300 mcg.      Bactrim [sulfamethoxazole-trimethoprim] Hives    Ceftazidime Hives     Pt stated can tolerate cefapine not ceftazidime    Ceftazidime     Dronabinol Other (See Comments)     Mental changes/hallucinations    Haldol [haloperidol lactate] Other (See Comments)     Seizure like activity    Nsaids (non-steroidal anti-inflammatory drug)      Cannot have due to lung transplant    Adhesive Rash     Cloth tape- please use tegaderm or paper tape    Aztreonam Rash    Ciprofloxacin Nausea And Vomiting     Projectile N/V, per patient.  Unwilling to retry therapy.       Review of Systems   Constitutional: Positive for appetite change and fatigue. Negative for chills, diaphoresis and fever.   HENT: Negative for congestion, postnasal drip, rhinorrhea, sinus pressure and sinus pain.    Respiratory: Positive for cough and shortness of breath. Negative for wheezing.    Cardiovascular: Positive for chest pain (incision site). Negative for palpitations and leg swelling.   Gastrointestinal: Negative for abdominal distention, abdominal pain, constipation, diarrhea, nausea and vomiting.   Genitourinary: Negative for decreased urine volume, difficulty urinating, dysuria and urgency.   Musculoskeletal: Positive for back pain (chronic). Negative for neck pain and neck stiffness.   Skin: Positive for pallor. Negative for rash and wound.   Allergic/Immunologic: Positive for immunocompromised state.   Neurological: Positive for weakness. Negative for dizziness, seizures, syncope and headaches.   Psychiatric/Behavioral: Negative for agitation, confusion and decreased concentration.  The patient is nervous/anxious.      Objective:   Physical Exam   Constitutional: She is oriented to person, place, and time. She appears well-developed and well-nourished. She is easily aroused. No distress.   HENT:   Head: Normocephalic and atraumatic.   Nose: Nose normal.   Eyes: Conjunctivae and EOM are normal. No scleral icterus.   Neck: Normal range of motion. Neck supple. No JVD present.   Cardiovascular: Normal rate, regular rhythm and normal heart sounds. Exam reveals no friction rub.   No murmur heard.  Pulmonary/Chest: Effort normal. No accessory muscle usage. No respiratory distress. She has decreased breath sounds in the right lower field and the left lower field. She has no wheezes. She has no rhonchi. She has no rales.   Abdominal: Soft. Bowel sounds are normal. She exhibits no distension. There is no tenderness.   Musculoskeletal: Normal range of motion. She exhibits no edema.   Neurological: She is alert, oriented to person, place, and time and easily aroused.   Skin: Skin is warm and dry. She is not diaphoretic. No erythema.   Psychiatric: Judgment normal. Her mood appears not anxious. Her speech is not delayed. She is not slowed. She exhibits normal recent memory.   Nursing note and vitals reviewed.        Vital Signs (Most Recent):  Temp: 98.3 °F (36.8 °C) (07/14/19 0911)  Pulse: 87 (07/14/19 1010)  Resp: 16 (07/14/19 1010)  BP: 130/80 (07/14/19 0911)  SpO2: (!) 94 % (07/14/19 1010) Vital Signs (24h Range):  Temp:  [98 °F (36.7 °C)-99.3 °F (37.4 °C)] 98.3 °F (36.8 °C)  Pulse:  [79-89] 87  Resp:  [16-20] 16  SpO2:  [92 %-95 %] 94 %  BP: (106-130)/(68-82) 130/80     Weight: 54.4 kg (119 lb 14.9 oz)  Body mass index is 22.66 kg/m².      Intake/Output Summary (Last 24 hours) at 7/14/2019 1112  Last data filed at 7/14/2019 0905  Gross per 24 hour   Intake 1110 ml   Output 0 ml   Net 1110 ml       Ventilator Data:          Hemodynamic Parameters:       Lines/Drains:       Introducer right internal  jugular (Active)   Specific Qualities Capped 6/30/2019  7:10 AM   Dressing Status Clean;Dry;Biopatch in place;Intact 6/30/2019  7:10 AM   Dressing Intervention Dressing reinforced 6/29/2019  3:00 AM   Dressing Change Due 06/29/19 6/30/2019  3:00 AM   Daily Line Review Performed 6/30/2019  7:10 AM   Number of days:             Port A Cath Single Lumen 06/24/14 1200 right subclavian (Active)   Accessed by: Radha Lanza 6/24/2019  5:00 AM   Dressing Type Transparent 6/30/2019  7:10 AM   Dressing Status Biopatch in place;Clean;Dry;Intact 6/30/2019  7:10 AM   Dressing Intervention Dressing reinforced 6/29/2019  3:00 AM   Dressing Change Due 06/30/19 6/30/2019  3:00 AM   Line Status Infusing 6/30/2019  7:10 AM   Flush Performed Yes 6/30/2019  7:10 AM   Date to be Reflushed 06/18/19 6/29/2019  3:00 AM   Daily Line Review Performed 6/30/2019  7:10 AM   Type of Needle Nicole 6/30/2019  3:00 AM   Gauge 20 6/30/2019  3:00 AM   Needle Length 1 in 6/30/2019  3:00 AM   Needle Status Left in place 6/30/2019  3:00 AM   Needle Insertion Date 06/24/19 6/30/2019  3:00 AM   Needle Insertion Time 0500 6/26/2019  3:00 AM   Number of days: 1831            Percutaneous Central Line Insertion/Assessment - double lumen  06/18/19 0510 (Active)   Dressing biopatch in place;dressing dry and intact 6/30/2019  7:10 AM   Securement secured w/ sutures 6/30/2019  7:10 AM   Additional Site Signs no erythema;no warmth 6/30/2019  7:10 AM   Distal Patency/Care infusing 6/30/2019  7:10 AM   Proximal Patency/Care infusing 6/30/2019  7:10 AM   Waveform normal 6/30/2019  7:10 AM   Line Interventions line leveled/zeroed 6/30/2019  7:10 AM   Dressing Change Due 06/29/19 6/30/2019  3:00 AM   Daily Line Review Performed 6/30/2019  7:10 AM   Number of days: 12            Peripheral IV - Single Lumen 06/27/19 0917 22 G Right Forearm (Active)   Site Assessment Clean;Dry;Intact;No redness;No swelling 6/30/2019  7:10 AM   Line Status Infusing 6/30/2019  7:10 AM    Dressing Status Clean;Dry;Intact 6/30/2019  7:10 AM   Dressing Intervention Dressing reinforced 6/29/2019  3:00 AM   Dressing Change Due 07/01/19 6/30/2019  3:00 AM   Site Change Due 07/01/19 6/29/2019  7:00 PM   Reason Not Rotated Not due 6/30/2019  7:10 AM   Number of days: 3            Chest Tube 06/29/19 1655 1 Right Fourth intercostal space;Midaxillary 7 Fr. (Active)   Chest Tube WDL WDL 6/30/2019  7:10 AM   Function -20 cm H2O 6/30/2019  7:10 AM   Air Leak/Fluctuation air leak present 6/30/2019  7:10 AM   Safety all tubing connections taped;2 rubber-tipped hemostats w/ patient;all connections secured;suction checked 6/30/2019  7:10 AM   Securement tubing secured to body distal to insertion site w/ tape 6/30/2019  7:10 AM   Patency Intervention Tip/tilt 6/30/2019  7:10 AM   Drainage Description Serosanguineous 6/30/2019  7:10 AM   Dressing Appearance occlusive gauze dressing intact 6/30/2019  7:10 AM   Dressing Care dressing changed 6/30/2019 10:00 AM   Left Subcutaneous Emphysema none present 6/30/2019  7:10 AM   Right Subcutaneous Emphysema none present 6/30/2019  7:10 AM   Site Assessment Clean;Dry;Intact;No redness;No swelling;Ecchymotic 6/30/2019 10:00 AM   Surrounding Skin Dry;Intact 6/30/2019  7:10 AM   Output (mL) 0 mL 6/30/2019 10:00 AM   Number of days: 0            Y Chest Tube 3 and 4 06/18/19 1151 3 Left Pleural 19 Fr. 4 Left Mediastinal 19 Fr. (Active)   Function -20 cm H2O 6/30/2019  7:10 AM   Air Leak/Fluctuation air leak not present 6/30/2019  7:10 AM   Safety all tubing connections taped;2 rubber-tipped hemostats w/ patient;all connections secured;suction checked 6/30/2019  7:10 AM   Securement tubing secured to body distal to insertion site w/ tape 6/30/2019  7:10 AM   Left Subcutaneous Emphysema none present 6/30/2019  7:10 AM   Right Subcutaneous Emphysema none present 6/30/2019  7:10 AM   Patency Intervention Tip/tilt 6/30/2019  7:10 AM   Drainage Description 3 Serosanguineous 6/30/2019   7:10 AM   Tube 3 Dressing Appearance occlusive gauze dressing intact 6/30/2019  7:10 AM   Tube 3 Dressing Care dressing changed 6/30/2019 10:00 AM   Site Assessment 3 Clean;Dry;Intact 6/30/2019 10:00 AM   Surrounding Skin 3 Dry;Intact 6/30/2019  7:10 AM   Drainage Description 4 Serosanguineous 6/30/2019  7:10 AM   Tube 4 Dressing Appearance occlusive gauze dressing intact 6/30/2019  7:10 AM   Tube 4 Dressing Care dressing changed 6/30/2019 10:00 AM   Site Assessment 4 Clean;Dry;Intact 6/30/2019 10:00 AM   Surrounding Skin 4 Dry;Intact 6/30/2019  7:10 AM   Output (mL) 0 mL 6/30/2019 10:00 AM   Number of days: 11       Significant Labs:  CBC:  Recent Labs   Lab 07/14/19 0600   WBC 3.82*   RBC 3.16*   HGB 8.9*   HCT 29.8*   *   MCV 94   MCH 28.2   MCHC 29.9*     BMP:  Recent Labs   Lab 07/14/19 0600      K 3.5      CO2 22*   BUN 16   CREATININE 0.9   CALCIUM 8.3*      Tacrolimus Levels:  Recent Labs   Lab 07/14/19 0600   TACROLIMUS 7.7     Microbiology:  Microbiology Results (last 7 days)     Procedure Component Value Units Date/Time    Fungus culture [[id number]]  (Abnormal) Collected:  07/05/19 1445    Order Status:  Completed Specimen:  Respiratory from Bronchial Wash Updated:  07/11/19 0852     Fungus (Mycology) Culture BRANDI GLABRATA  Many      Narrative:       Bronchial Wash    Fungus culture [[id number]] Collected:  06/25/19 0900    Order Status:  Completed Specimen:  Respiratory from Bronchial Wash Updated:  07/10/19 0918     Fungus (Mycology) Culture Culture in progress      No fungus isolated after 2 weeks    Narrative:       Bronchial Wash    AFB Culture & Smear [[id number]] Collected:  07/05/19 1445    Order Status:  Completed Specimen:  Respiratory from Bronchial Wash Updated:  07/08/19 1540     AFB Culture & Smear Culture in progress     AFB CULTURE STAIN No acid fast bacilli seen.    Narrative:       Bronchial Wash    Culture, Respiratory [[id number]]  (Abnormal)  (Susceptibility)  Collected:  07/05/19 1445    Order Status:  Completed Specimen:  Respiratory from Bronchial Wash Updated:  07/08/19 1100     Respiratory Culture No S aureus isolated.      PSEUDOMONAS AERUGINOSA   Moderate  Normal respiratory anibal also present       Gram Stain (Respiratory) Few WBC's     Gram Stain (Respiratory) Few Gram positive rods     Gram Stain (Respiratory) Few budding yeast    Narrative:       Bronchial Wash          I have reviewed all pertinent labs within the past 24 hours.    Diagnostic Results:  Chest X-Ray: Vascular catheters, right pleural drain and monitoring leads remain.  There is some high-density material in the upper abdomen likely from the modified barium swallow.  Heart is enlarged there is mild increase in the pulmonary vascular markings.  Persistent parenchymal opacification bilaterally.  No significant pneumothorax seen.  Probable right pleural effusion.           Assessment/Plan:     * S/P lung transplant  POD#26 s/p bilateral lung transplant (re-transplant) for CARLOS EDUARDO. 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 PGD 0. S/p bronch and extubated to HFNC on POD#2, but was re-intubated 6/21 for hypercapnic respiratory failure. Bronchoscopy 7/5 with  pseudomonas.     Continue PT/OT/CPT/nebs/IS. Continue immunosuppression and prophylaxis.     Immunosuppression  Previously on tacrolimus, MMF, and daily prednisone. S/p solumedrol in OR, basiliximab on POD#0 and POD#4. Continue MMF and steroid taper. Will resume tacrolimus 0.5 mg BID.  Monitor daily tacrolimus levels and adjust dose as needed.       Prophylactic antibiotic  CMV D-/R+. Received Vanc/Merrem as routine surgical prophylaxis, which was transitioned to Cipro. Continue OIP with valganciclovir and dapsone. Continue micafungin for Candida glabrata. Continue voriconazole for aspergillus.    Acute blood loss anemia  Received 6u PRBCs, 2u plts, 1 cryo, and 4 FFP angie-operatively. Will continue to monitor and be conservative with future  transfusions.     Anxiety disorder  Continue home dose of Effexor and Ativan. Psych consulted for assistance, appreciate recs.    Chronic pain with opiate use  Transitioned to home regimen of MS Contin 15 mg BID.  Oxy 10 mg Q6hrs PRN. Limit sedating medications.      CF related Pancreatic insufficiency  Continue Creon with meals and snacks.     Aspergillus  Positive aspergillus ag from BAL on 7/5. Continue voriconazole.    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 Received Tobramycin x1 on 6/22 and was transitioned from Merrem to Cipro. BAL from 7/5 with  Pseudomonas. Started on Zerbaxa 7/9. ID following, appreciate recs.     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 Repeat BAL on 6/23 with Candida glabrata. Continue micafungin per ID recs.    Adrenal cortical steroids causing adverse effect in therapeutic use  Endocrinology consulted, appreciate recs.        Shama Canales PA-C  Lung Transplant  [facility]

## 2019-07-15 NOTE — CARE UPDATE
BG goal 140-180     Remains in TSU, NAEON  BG fairly well controlled on current insulin regimen  Prednisone 20 mg daily  On IV antibiotics     Continue:  Novolog 2-5 units with meals  Novolog 3 units PRN snack dose  Moderate dose correction scale  BG monitoring AC/HS     Endocrine to continue to follow     ** Please call Endocrine for any BG related issues **

## 2019-07-15 NOTE — PROGRESS NOTES
[facility]  Lung Transplant  Progress Note - Floor    Patient Name: Juanita Ibarra  MRN: [id number]  Admission Date: 6/17/2019  Hospital Length of Stay: 27 days  Post-Operative Day: 1859 (Lung), 27 (Lung)  Attending Physician: Jake Alvarez MD  Primary Care Provider: Primary Doctor No     Subjective:     Interval History: No acute events overnight.  Will start discharge planning for tomorrow or Wednesday    Continuous Infusions:  Scheduled Meds:   ceftolozane-tazobactam  1,500 mg Intravenous Q8H    cetirizine  10 mg Oral Daily    dapsone  100 mg Oral Daily    enoxaparin  40 mg Subcutaneous Daily    gabapentin  300 mg Oral TID    insulin aspart U-100  2-5 Units Subcutaneous TIDWM    levalbuterol  1.25 mg Nebulization Q6H WAKE    lipase-protease-amylase 24,000-76,000-120,000 units  6 capsule Oral TID WM    metoprolol tartrate  25 mg Oral TID    micafungin (MYCAMINE) IVPB  100 mg Intravenous Q24H    morphine  15 mg Oral Q12H    mycophenolate  250 mg Oral BID    predniSONE  20 mg Oral Daily    QUEtiapine  50 mg Oral QHS    sodium chloride 7%  4 mL Nebulization Q12H    tacrolimus  0.5 mg Oral BID    valGANciclovir  450 mg Oral BID    venlafaxine  150 mg Oral QHS    venlafaxine  37.5 mg Oral QHS    voriconazole  200 mg Oral BID     PRN Meds:acetaminophen, bisacodyl, Dextrose 10% Bolus, Dextrose 10% Bolus, diphenhydrAMINE, glucagon (human recombinant), glucose, glucose, insulin aspart U-100, insulin aspart U-100, lactulose, levalbuterol, lipase-protease-amylase 24,000-76,000-120,000 units, loperamide, LORazepam, magnesium sulfate IVPB, metoclopramide HCl, metoprolol, naloxone, ondansetron, ondansetron, oxyCODONE, potassium chloride in water **AND** potassium chloride in water **AND** potassium chloride in water    Review of patient's allergies indicates:   Allergen Reactions    Albuterol Palpitations    Colistin Anaphylaxis    Vancomycin analogues      Infusion  reaction that does not resolve with slowing  Pt. States she can tolerate it when given with 50 mg Benadryl and ran over 3 hours    Neupogen [filgrastim] Other (See Comments)     Ostealgia after five daily doses of 300 mcg.      Bactrim [sulfamethoxazole-trimethoprim] Hives    Ceftazidime Hives     Pt stated can tolerate cefapine not ceftazidime    Ceftazidime     Dronabinol Other (See Comments)     Mental changes/hallucinations    Haldol [haloperidol lactate] Other (See Comments)     Seizure like activity    Nsaids (non-steroidal anti-inflammatory drug)      Cannot have due to lung transplant    Adhesive Rash     Cloth tape- please use tegaderm or paper tape    Aztreonam Rash    Ciprofloxacin Nausea And Vomiting     Projectile N/V, per patient.  Unwilling to retry therapy.       Review of Systems   Constitutional: Negative for chills, diaphoresis and fever.   HENT: Negative for congestion, postnasal drip, rhinorrhea, sinus pressure and sinus pain.    Respiratory: Positive for cough and shortness of breath (with exertion). Negative for wheezing.    Cardiovascular: Positive for chest pain (incision site). Negative for palpitations and leg swelling.   Gastrointestinal: Negative for abdominal distention, abdominal pain, constipation, diarrhea, nausea and vomiting.   Genitourinary: Negative for decreased urine volume, difficulty urinating, dysuria and urgency.   Musculoskeletal: Positive for back pain (chronic). Negative for neck pain and neck stiffness.   Skin: Positive for pallor. Negative for rash and wound.   Allergic/Immunologic: Positive for immunocompromised state.   Neurological: Positive for weakness. Negative for dizziness, seizures, syncope and headaches.   Psychiatric/Behavioral: Negative for agitation, confusion and decreased concentration. The patient is nervous/anxious (chronic).      Objective:   Physical Exam   Constitutional: She is oriented to person, place, and time. She appears  well-developed and well-nourished. She is easily aroused. No distress.   HENT:   Head: Normocephalic and atraumatic.   Nose: Nose normal.   Eyes: Conjunctivae and EOM are normal. No scleral icterus.   Neck: Normal range of motion. Neck supple. No JVD present.   Cardiovascular: Normal rate, regular rhythm and normal heart sounds. Exam reveals no friction rub.   No murmur heard.  Pulmonary/Chest: Effort normal. No accessory muscle usage. No respiratory distress. She has decreased breath sounds in the right lower field and the left lower field. She has no wheezes. She has no rhonchi. She has no rales.   Abdominal: Soft. Bowel sounds are normal. She exhibits no distension. There is no tenderness.   Musculoskeletal: Normal range of motion. She exhibits no edema.   Neurological: She is alert, oriented to person, place, and time and easily aroused.   Skin: Skin is warm and dry. She is not diaphoretic. No erythema.   Psychiatric: Judgment normal. Her mood appears not anxious. Her speech is not delayed. She is not slowed. She exhibits normal recent memory.   Nursing note and vitals reviewed.        Vital Signs (Most Recent):  Temp: 98.4 °F (36.9 °C) (07/15/19 0930)  Pulse: 80 (07/15/19 0930)  Resp: 17 (07/15/19 0930)  BP: 120/70 (07/15/19 0930)  SpO2: (!) 93 % (07/15/19 0930) Vital Signs (24h Range):  Temp:  [98 °F (36.7 °C)-98.7 °F (37.1 °C)] 98.4 °F (36.9 °C)  Pulse:  [78-86] 80  Resp:  [16-20] 17  SpO2:  [92 %-95 %] 93 %  BP: (113-128)/(70-88) 120/70     Weight: 54.8 kg (120 lb 13 oz)  Body mass index is 22.83 kg/m².      Intake/Output Summary (Last 24 hours) at 7/15/2019 1101  Last data filed at 7/15/2019 0608  Gross per 24 hour   Intake 910 ml   Output 0 ml   Net 910 ml       Significant Labs:  CBC:  Recent Labs   Lab 07/15/19  0600   WBC 3.45*   RBC 2.98*   HGB 8.5*   HCT 28.6*   *   MCV 96   MCH 28.5   MCHC 29.7*     BMP:  Recent Labs   Lab 07/15/19  0600      K 3.6      CO2 26   BUN 16   CREATININE  0.8   CALCIUM 8.4*      Tacrolimus Levels:  Recent Labs   Lab 07/14/19  0600   TACROLIMUS 7.7     Microbiology:  Microbiology Results (last 7 days)     Procedure Component Value Units Date/Time    Fungus culture [[id number]]  (Abnormal) Collected:  07/05/19 1445    Order Status:  Completed Specimen:  Respiratory from Bronchial Wash Updated:  07/11/19 0852     Fungus (Mycology) Culture BRANDI GLABRATA  Many      Narrative:       Bronchial Wash    Fungus culture [[id number]] Collected:  06/25/19 0900    Order Status:  Completed Specimen:  Respiratory from Bronchial Wash Updated:  07/10/19 0918     Fungus (Mycology) Culture Culture in progress      No fungus isolated after 2 weeks    Narrative:       Bronchial Wash    AFB Culture & Smear [[id number]] Collected:  07/05/19 1445    Order Status:  Completed Specimen:  Respiratory from Bronchial Wash Updated:  07/08/19 1540     AFB Culture & Smear Culture in progress     AFB CULTURE STAIN No acid fast bacilli seen.    Narrative:       Bronchial Wash          I have reviewed all pertinent labs within the past 24 hours.        Assessment/Plan:     * S/P lung transplant  POD#27 s/p bilateral lung transplant (re-transplant) for CARLOS EDUARDO. 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 PGD 0. S/p bronch and extubated to HFNC on POD#2, but was re-intubated 6/21 for hypercapnic respiratory failure. Bronchoscopy 7/5 with  pseudomonas.     Continue PT/OT/CPT/nebs/IS. Continue immunosuppression and prophylaxis. Will plan for discharge tomorrow or Wednesday.      Immunosuppression  Previously on tacrolimus, MMF, and daily prednisone. S/p solumedrol in OR, basiliximab on POD#0 and POD#4. Continue MMF, steroid taper, and tacrolimus.  Monitor daily tacrolimus levels and adjust dose as needed.       Prophylactic antibiotic  CMV D-/R+. Received Vanc/Merrem as routine surgical prophylaxis, which was transitioned to Cipro. Continue OIP with valganciclovir and dapsone. Continue micafungin for Candida  glabrata. Continue voriconazole for aspergillus.    CF related Pancreatic insufficiency  Continue Creon with meals and snacks.     Anxiety disorder  Continue home dose of Effexor and Ativan. Psych consulted for assistance, appreciate recs.    Acute blood loss anemia  Received 6u PRBCs, 2u plts, 1 cryo, and 4 FFP angie-operatively. Will continue to monitor and be conservative with future transfusions.     Chronic pain with opiate use  Transitioned to home regimen of MS Contin 15 mg BID.  Oxy 10 mg Q6hrs PRN. Limit sedating medications.      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 Received Tobramycin x1 on 6/22 and was transitioned from Merrem to Cipro. BAL from 7/5 with  Pseudomonas. Started on Zerbaxa 7/9. ID following, appreciate recs.     Adrenal cortical steroids causing adverse effect in therapeutic use  Endocrinology consulted, appreciate recs.    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 Repeat BAL on 6/23 with Candida glabrata. Continue micafungin per ID recs.    Aspergillus  Positive aspergillus ag from BAL on 7/5. Continue voriconazole.        Reyes Will NP  Lung Transplant  [facility]

## 2019-07-15 NOTE — PT/OT/SLP PROGRESS
"Physical Therapy Treatment    Patient Name:  Juanita Ibarra   MRN:  [id number]    Recommendations:     Discharge Recommendations:  rehabilitation facility   Discharge Equipment Recommendations: commode, walker, rolling   Barriers to discharge: decreased functional mobility requiring increased assistance    Assessment:     Juanita Ibarra is a 29 y.o. female admitted with a medical diagnosis of S/P lung transplant.  She presents with the following impairments/functional limitations:  weakness, impaired endurance, impaired self care skills, impaired functional mobilty, gait instability, impaired balance, decreased lower extremity function, decreased safety awareness. Pt tolerates session well with excellent improvement in functional mobility noted this day. Pt with improved motivation to participate and demonstrates CGA/SBA for all mobility. Pt able to ambulate out into hallway this day with all precautions maintained appropriately. Pt will continue to benefit from therapy services to address impairments listed above.     Rehab Prognosis: Good; patient would benefit from acute skilled PT services to address these deficits and reach maximum level of function.    Recent Surgery: Procedure(s) (LRB):  Bronchoscopy (N/A) 10 Days Post-Op    Plan:     During this hospitalization, patient to be seen 5 x/week to address the identified rehab impairments via gait training, therapeutic activities, therapeutic exercises, neuromuscular re-education and progress toward the following goals:    · Plan of Care Expires:  07/17/19    Subjective     Chief Complaint: no c/o  Patient/Family Comments/goals: "I'm glad you didn't come at 10 AM because I am just not a morning person.   Pain/Comfort:  · Pain Rating 1: 0/10  · Pain Rating Post-Intervention 1: 0/10      Objective:     Communicated with NSG prior to session.  Patient found HOB elevated with telemetry, central line upon PTA entry to room.     General Precautions: " Standard, fall, aspiration, contact, neutropenic   Orthopedic Precautions:N/A   Braces: N/A     Functional Mobility:  · Bed Mobility:     · Supine to Sit: stand by assistance  · Sit to Supine: stand by assistance  · Transfers:     · Sit to Stand:  stand by assistance with no AD  · Toilet Transfer: contact guard assistance with  rolling walker  using  Step Transfer  · Gait: Pt ambulates 14 ft, 110 ft, and 176 ft with no RW and CGA. Pt with seated rest between trials. Decreased heel strike and occasional flexed knees in all phases. Pt able to correct with verbal cues and education.       AM-PAC 6 CLICK MOBILITY  Turning over in bed (including adjusting bedclothes, sheets and blankets)?: 3  Sitting down on and standing up from a chair with arms (e.g., wheelchair, bedside commode, etc.): 3  Moving from lying on back to sitting on the side of the bed?: 3  Moving to and from a bed to a chair (including a wheelchair)?: 3  Need to walk in hospital room?: 3  Climbing 3-5 steps with a railing?: 2  Basic Mobility Total Score: 17       Therapeutic Activities and Exercises:  Pt assisted with functional mobility as noted above.   Pt with increased time for seated/standing rest breaks with all mobility.   Pt assisted to bathroom where pt stands to wash hands without difficulty before toileting with mod-I.   Pt educated on PT POC and importance of continued ambulation in room/ out of room with significant other and use of RW. NSG notified.     Patient left seated at EOB with all lines intact, call button in reach and significant other present.    GOALS:   Multidisciplinary Problems     Physical Therapy Goals        Problem: Physical Therapy Goal    Goal Priority Disciplines Outcome Goal Variances Interventions   Physical Therapy Goal     PT, PT/OT Ongoing (interventions implemented as appropriate)     Description:  Goals to be met by: 19    Patient will increase functional independence with mobility by performin. Supine  <> sit with stand-by assistance.  2. Sit to stand transfer from bedside chair with contact guard assistance  3. Pt to perform stand pivot transfer with Tonio, with use of RW.  4. Gait  x 50 feet with Tonio, with use of RW.  5.  Pt to perform and be compliant with exercise program in sitting to improve BLE strength and endurance                    Time Tracking:     PT Received On: 07/14/19  PT Start Time: 1448     PT Stop Time: 1515  PT Total Time (min): 27 min     Billable Minutes: Gait Training 17 and Therapeutic Activity 10    Treatment Type: Treatment  PT/PTA: PTA     PTA Visit Number: 2     Philip Medrano, PTA  07/15/2019

## 2019-07-15 NOTE — ASSESSMENT & PLAN NOTE
-s/p b/l lung transplant 6/18/19 2/2 progressive end stage CARLOS EDUARDO  -complicated by hypercapnic resp failure, now extubated  -Bronchoscopy 7/5 with  pseudomonas  -currently on RA

## 2019-07-15 NOTE — PT/OT/SLP PROGRESS
"Physical Therapy Treatment    Patient Name:  Juanita Ibarra   MRN:  [id number]    Recommendations:     Discharge Recommendations:  home, outpatient PT   Discharge Equipment Recommendations: walker, rolling, commode   Barriers to discharge: None    Assessment:     Juanita Ibarra is a 29 y.o. female admitted with a medical diagnosis of S/P lung transplant.  She presents with the following impairments/functional limitations:  weakness, gait instability, impaired balance, impaired endurance, impaired functional mobilty, decreased safety awareness. Pt agreeable to PT treatment and ambulation. Pt increased ambulation distance however required standing rest break and demonstrated x2 LOB due to decreased safety awareness with walker management. Pt able to self-correct with CGA from therapist. Pt would continue to benefit from acute skilled PT services 4x/week to optimize independence with functional mobility. Recommend home with outpatient PT upon discharge when medically stable.     Rehab Prognosis: Good; patient would benefit from acute skilled PT services to address these deficits and reach maximum level of function.    Recent Surgery: Procedure(s) (LRB):  Bronchoscopy (N/A) 10 Days Post-Op    Plan:     During this hospitalization, patient to be seen 4 x/week to address the identified rehab impairments via gait training, therapeutic activities, therapeutic exercises, neuromuscular re-education and progress toward the following goals:    · Plan of Care Expires:  07/17/19    Subjective     Chief Complaint: "You are making me nervous with your hand right there" -referring to SPT being SBA during ambulation; difficulty with walker during turning    Patient/Family Comments/goals: to go home   Pain/Comfort:  · Pain Rating 1: 0/10      Objective:     Communicated with RN prior to session.  Patient found HOB elevated with telemetry, central line(Port-a-cath (R)) upon PT entry to room.     General Precautions: " Standard, aspiration, fall   Orthopedic Precautions:N/A   Braces: N/A     Functional Mobility:  · Bed Mobility: *HOB elevated for all bed mobility   · Rolling Left:  supervision  · Scooting: supervision  · Supine to Sit: supervision  · Sit to Supine: supervision    · Transfers:    · Sit to Stand:  stand by assistance with no AD  · Toilet Transfer: supervision with  no AD  using  Step Transfer; pt able to perform angie-care independently     · Gait: Ambulated x180' and x160' (340' total) with RW, SBA/CGA. Pt required x1 prolonged standing rest break and demonstrated x2 LOB due to decreased safety awareness with walker management. Pt able to self-correct with CGA from therapist and cueing for walker management (especially with turns) and ankle articulation for optimal foot clearance.    · Balance:   · Static sitting on EOB: Supervision  · Static standing with/without RW: SBA      AM-PAC 6 CLICK MOBILITY  Turning over in bed (including adjusting bedclothes, sheets and blankets)?: 3  Sitting down on and standing up from a chair with arms (e.g., wheelchair, bedside commode, etc.): 3  Moving from lying on back to sitting on the side of the bed?: 3  Moving to and from a bed to a chair (including a wheelchair)?: 3  Need to walk in hospital room?: 3  Climbing 3-5 steps with a railing?: 3  Basic Mobility Total Score: 18       Therapeutic Activities and Exercises:   - Pt educated on PT POC, goals, discharge recommendations, DME, gait and RW technique, and benefits of OOB activity/continued mobility    Patient left HOB elevated with all lines intact, call button in reach and  present..    GOALS:   Multidisciplinary Problems     Physical Therapy Goals        Problem: Physical Therapy Goal    Goal Priority Disciplines Outcome Goal Variances Interventions   Physical Therapy Goal     PT, PT/OT Ongoing (interventions implemented as appropriate)     Description:  Goals to be met by: 7/19/19    Patient will increase functional  independence with mobility by performin. Supine <> sit with stand-by assistance. -Met  2. Sit to stand transfer from bedside chair with contact guard assistance -met   Revised: Sit to stand transfer with Supervision   3. Pt to perform stand pivot transfer with Tonio, with use of RW. -met  4. Gait  x 50 feet with Tonio, with use of RW.-met   Gait x 300' with Supervision with use of RW without significant LOB   5.  Pt to perform and be compliant with exercise program in sitting to improve BLE strength and endurance                     Time Tracking:     PT Received On: 07/15/19  PT Start Time: 1516     PT Stop Time: 1547  PT Total Time (min): 31 min     Billable Minutes: Gait Training 21 and Therapeutic Activity 10    Treatment Type: Treatment  PT/PTA: PT     PTA Visit Number: 0     KARSTEN Hurtado  07/15/2019

## 2019-07-15 NOTE — PLAN OF CARE
Problem: Adult Inpatient Plan of Care  Goal: Plan of Care Review  Recommendations     Recommendation/Intervention: 1.) Continue ordered diet by SLP. 2.) Daily weights.   Goals: 1.) Pt to consume/tolerate >75% of meals by follow up.   Nutrition Goal Status: progressing towards goal  Communication of RD Recs: (POC)    Assessment and Plan  Nutrition Problem  Increased nutrient needs  Swallowing difficulty     Related to (etiology):   Physiological causes related to healing  Mechanical causes from surgery     Signs and Symptoms (as evidenced by):   S/p redo BLTx 6/18   Abnormal findings from barium swallow evaluation     Interventions/Recommendations (treatment strategy):  Collaboration and referral of nutrition care     Nutrition Diagnosis Status:   Continues   Progressing     Nutrition Problem  Inadequate energy intake     Related to (etiology):   Decreased ability to consume sufficient energy     Signs and Symptoms (as evidenced by):   NPO with no alternative means of nutrition at this time     Interventions/Recommendations (treatment strategy):  Collaboration of nutrition care with other providers     Nutrition Diagnosis Status:   Resolved

## 2019-07-15 NOTE — PLAN OF CARE
Problem: Adult Inpatient Plan of Care  Goal: Plan of Care Review  Outcome: Ongoing (interventions implemented as appropriate)  PT AAO. S/p yoana lut 6/18/19.  at bedside - very supportive.  Room air 93-97%.  VSS- no visi as the adhesive burns her skin.  RSC port a cath CDI- due to be reaccessed erika.  Left SC Colón CDI.  Antibiotics infusing to port and colón.  Dressing changes today per line service. Walking to the restroom and voiding in the toilet- clear yellow uirine.  Immodium given x1.   Clamshell incision clean dry and intact with dermabond. Receiving OXy 10mg IR Q6 and ms contin scheduled.   ON contact isolationf or  in the sputum. Accuchecks ac/hs - eating 50% meals.    Plan to be discharged as sson as wed with home iV abx and PT/OT.  Pt in good spirits.

## 2019-07-15 NOTE — PLAN OF CARE
Problem: SLP Goal  Goal: SLP Goal  Speech Language Pathology Goals  Goals expected to be met by 7/4:  1. Patient will participate in ongoing swallow assessment.   2. When determined by SLP, patient may benefit from a repeat modified barium swallow study to further objectively assess swallow function/safety.        Outcome: Ongoing (interventions implemented as appropriate)  Dental soft diet with nectar thick liquids recommended.    Shirley Pepe MA/ANA-SLP  Speech Language Pathologist  Pager [phone]  7/15/2019

## 2019-07-15 NOTE — SUBJECTIVE & OBJECTIVE
Interval History 7/15/2019:  Patient is seen for follow-up rehab evaluation and recommendations: Improving with therapy.    HPI, Past Medical, Family, and Social History remains the same as documented in the initial encounter.    Scheduled Medications:    ceftolozane-tazobactam  1,500 mg Intravenous Q8H    cetirizine  10 mg Oral Daily    dapsone  100 mg Oral Daily    enoxaparin  40 mg Subcutaneous Daily    gabapentin  300 mg Oral TID    insulin aspart U-100  2-5 Units Subcutaneous TIDWM    levalbuterol  1.25 mg Nebulization Q6H WAKE    lipase-protease-amylase 24,000-76,000-120,000 units  6 capsule Oral TID WM    metoprolol tartrate  25 mg Oral TID    micafungin (MYCAMINE) IVPB  100 mg Intravenous Q24H    morphine  15 mg Oral Q12H    mycophenolate  250 mg Oral BID    predniSONE  20 mg Oral Daily    QUEtiapine  50 mg Oral QHS    sodium chloride 7%  4 mL Nebulization Q12H    tacrolimus  0.5 mg Oral BID    valGANciclovir  450 mg Oral BID    venlafaxine  150 mg Oral QHS    venlafaxine  37.5 mg Oral QHS    voriconazole  200 mg Oral BID       Diagnostic Results: Labs: Reviewed    PRN Medications: acetaminophen, bisacodyl, Dextrose 10% Bolus, Dextrose 10% Bolus, diphenhydrAMINE, glucagon (human recombinant), glucose, glucose, insulin aspart U-100, insulin aspart U-100, lactulose, levalbuterol, lipase-protease-amylase 24,000-76,000-120,000 units, loperamide, LORazepam, magnesium sulfate IVPB, metoclopramide HCl, metoprolol, naloxone, ondansetron, ondansetron, oxyCODONE, potassium chloride in water **AND** potassium chloride in water **AND** potassium chloride in water    Review of Systems   Constitutional: Positive for activity change. Negative for chills and fatigue.   HENT: Negative for trouble swallowing and voice change.    Eyes: Negative for photophobia and visual disturbance.   Respiratory: Positive for cough and shortness of breath.    Cardiovascular: Positive for chest pain. Negative for  palpitations.   Gastrointestinal: Positive for diarrhea. Negative for nausea and vomiting.   Genitourinary: Negative for difficulty urinating and flank pain.   Musculoskeletal: Positive for arthralgias, back pain, gait problem and myalgias.   Skin: Negative for color change and rash.   Allergic/Immunologic: Positive for immunocompromised state.   Neurological: Positive for weakness. Negative for speech difficulty.   Psychiatric/Behavioral: Negative for confusion. The patient is not nervous/anxious.      Objective:     Vital Signs (Most Recent):  Temp: 98.4 °F (36.9 °C) (07/15/19 0930)  Pulse: 80 (07/15/19 0930)  Resp: 17 (07/15/19 0930)  BP: 120/70 (07/15/19 0930)  SpO2: (!) 93 % (07/15/19 0930)    Vital Signs (24h Range):  Temp:  [98 °F (36.7 °C)-98.4 °F (36.9 °C)] 98.4 °F (36.9 °C)  Pulse:  [78-86] 80  Resp:  [16-20] 17  SpO2:  [93 %-95 %] 93 %  BP: (120-128)/(70-88) 120/70     Physical Exam   Constitutional: She appears well-developed and well-nourished. She is easily aroused.   HENT:   Head: Normocephalic and atraumatic.   Eyes: Right eye exhibits no discharge. Left eye exhibits no discharge.   Neck: Neck supple.   Cardiovascular: Normal rate and intact distal pulses.   Pulmonary/Chest: Effort normal. No respiratory distress.   On RA   Abdominal: Soft. There is no tenderness.   Musculoskeletal: She exhibits no edema or deformity.   Generalized deconditioning    Neurological: She is alert and easily aroused.    follows commands     Skin: Skin is warm and dry.   Psychiatric: She has a normal mood and affect. Her behavior is normal.   Vitals reviewed.

## 2019-07-15 NOTE — ASSESSMENT & PLAN NOTE
Previously on tacrolimus, MMF, and daily prednisone. S/p solumedrol in OR, basiliximab on POD#0 and POD#4. Continue MMF, steroid taper, and tacrolimus.  Monitor daily tacrolimus levels and adjust dose as needed.

## 2019-07-15 NOTE — PROGRESS NOTES
[facility]  Physical Medicine & Rehab  Progress Note    Patient Name: Juanita Ibarra  MRN: [id number]  Admission Date: 6/17/2019  Length of Stay: 27 days  Attending Physician: Jake Alvarez MD    Subjective:     Principal Problem:S/P lung transplant    Hospital Course:   07/8/2019: OT-Bed mobility CGA-MaxA .  Sit to stand Мария & RW and transfers Min-modA.  Ambulated 3 steps Мария & RW with complaints of fatigue.  Toileting Мария and LBD SV.  07/9/2019:  PT-Sit to stand ModA.  Ambulated 3 steps + 3 steps + 4 steps ModA & RW. On each trial, pt required mod-max A to control descent into sitting in chair 2/2 sudden descent, which pt attributed to fatigue.    07/10/2019: Sit to stand Мария-ModA and transfers ModA. Unable to take steps 2/2 pain and weakness.    7/15/19: PT- Bed mobility SBA.  Sit to stand SBA and transfers CGA & RW.  Ambulated 14 ft, 110ft, and 176 ft CGA & RW.     Interval History 7/15/2019:  Patient is seen for follow-up rehab evaluation and recommendations: Improving with therapy.    HPI, Past Medical, Family, and Social History remains the same as documented in the initial encounter.    Scheduled Medications:    ceftolozane-tazobactam  1,500 mg Intravenous Q8H    cetirizine  10 mg Oral Daily    dapsone  100 mg Oral Daily    enoxaparin  40 mg Subcutaneous Daily    gabapentin  300 mg Oral TID    insulin aspart U-100  2-5 Units Subcutaneous TIDWM    levalbuterol  1.25 mg Nebulization Q6H WAKE    lipase-protease-amylase 24,000-76,000-120,000 units  6 capsule Oral TID WM    metoprolol tartrate  25 mg Oral TID    micafungin (MYCAMINE) IVPB  100 mg Intravenous Q24H    morphine  15 mg Oral Q12H    mycophenolate  250 mg Oral BID    predniSONE  20 mg Oral Daily    QUEtiapine  50 mg Oral QHS    sodium chloride 7%  4 mL Nebulization Q12H    tacrolimus  0.5 mg Oral BID    valGANciclovir  450 mg Oral BID    venlafaxine  150 mg Oral QHS    venlafaxine  37.5 mg Oral QHS     voriconazole  200 mg Oral BID       Diagnostic Results: Labs: Reviewed    PRN Medications: acetaminophen, bisacodyl, Dextrose 10% Bolus, Dextrose 10% Bolus, diphenhydrAMINE, glucagon (human recombinant), glucose, glucose, insulin aspart U-100, insulin aspart U-100, lactulose, levalbuterol, lipase-protease-amylase 24,000-76,000-120,000 units, loperamide, LORazepam, magnesium sulfate IVPB, metoclopramide HCl, metoprolol, naloxone, ondansetron, ondansetron, oxyCODONE, potassium chloride in water **AND** potassium chloride in water **AND** potassium chloride in water    Review of Systems   Constitutional: Positive for activity change. Negative for chills and fatigue.   HENT: Negative for trouble swallowing and voice change.    Eyes: Negative for photophobia and visual disturbance.   Respiratory: Positive for cough and shortness of breath.    Cardiovascular: Positive for chest pain. Negative for palpitations.   Gastrointestinal: Positive for diarrhea. Negative for nausea and vomiting.   Genitourinary: Negative for difficulty urinating and flank pain.   Musculoskeletal: Positive for arthralgias, back pain, gait problem and myalgias.   Skin: Negative for color change and rash.   Allergic/Immunologic: Positive for immunocompromised state.   Neurological: Positive for weakness. Negative for speech difficulty.   Psychiatric/Behavioral: Negative for confusion. The patient is not nervous/anxious.      Objective:     Vital Signs (Most Recent):  Temp: 98.4 °F (36.9 °C) (07/15/19 0930)  Pulse: 80 (07/15/19 0930)  Resp: 17 (07/15/19 0930)  BP: 120/70 (07/15/19 0930)  SpO2: (!) 93 % (07/15/19 0930)    Vital Signs (24h Range):  Temp:  [98 °F (36.7 °C)-98.4 °F (36.9 °C)] 98.4 °F (36.9 °C)  Pulse:  [78-86] 80  Resp:  [16-20] 17  SpO2:  [93 %-95 %] 93 %  BP: (120-128)/(70-88) 120/70     Physical Exam   Constitutional: She appears well-developed and well-nourished. She is easily aroused.   HENT:   Head: Normocephalic and atraumatic.    Eyes: Right eye exhibits no discharge. Left eye exhibits no discharge.   Neck: Neck supple.   Cardiovascular: Normal rate and intact distal pulses.   Pulmonary/Chest: Effort normal. No respiratory distress.   On RA   Abdominal: Soft. There is no tenderness.   Musculoskeletal: She exhibits no edema or deformity.   Generalized deconditioning    Neurological: She is alert and easily aroused.    follows commands  Skin: Skin is warm and dry.   Psychiatric: She has a normal mood and affect. Her behavior is normal.   Vitals reviewed.    Assessment/Plan:      Aspergillus  -on voriconazole IV    Impaired functional mobility and endurance  See hospital course for functional, cognitive/speech/language, and nutrition/swallow status.      Recommendations  -  Encourage mobility, OOB in chair at least 3 hours per day, and early ambulation as appropriate   -  PT/OT evaluate and treat  -  SLP speech and cognitive evaluate and treat  -  Monitor sleep disturbances and establish consistent sleep-wake cycle  -  Monitor for bowel and bladder dysfunction  -  Monitor for shoulder pain, subluxation, & spasticity  -  Monitor for and prevent skin breakdown and pressure ulcers  · Early mobility, repositioning/weight shifting every 20-30 minutes when sitting, turn patient every 2 hours, proper mattress/overlay and chair cushioning, pressure relief/heel protector boots  -  DVT prophylaxis (if appropriate)  -  Reviewed discharge options (IP rehab, SNF, HH therapy, and OP therapy)    Candida glabrata infection  -on Micafungin IV     Lung transplant status, bilateral  -s/p b/l lung transplant 6/18/19 2/2 progressive end stage CARLOS EDUARDO  -complicated by hypercapnic resp failure, now extubated  -Bronchoscopy 7/5 with  pseudomonas  -currently on RA     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 Per patient, insurance does not cover IRF.       Norma Bhatia NP  Department of Physical Medicine & Rehab   [facility]

## 2019-07-15 NOTE — SUBJECTIVE & OBJECTIVE
Subjective:     Interval History: No acute events overnight.  Will start discharge planning for tomorrow or Wednesday    Continuous Infusions:  Scheduled Meds:   ceftolozane-tazobactam  1,500 mg Intravenous Q8H    cetirizine  10 mg Oral Daily    dapsone  100 mg Oral Daily    enoxaparin  40 mg Subcutaneous Daily    gabapentin  300 mg Oral TID    insulin aspart U-100  2-5 Units Subcutaneous TIDWM    levalbuterol  1.25 mg Nebulization Q6H WAKE    lipase-protease-amylase 24,000-76,000-120,000 units  6 capsule Oral TID WM    metoprolol tartrate  25 mg Oral TID    micafungin (MYCAMINE) IVPB  100 mg Intravenous Q24H    morphine  15 mg Oral Q12H    mycophenolate  250 mg Oral BID    predniSONE  20 mg Oral Daily    QUEtiapine  50 mg Oral QHS    sodium chloride 7%  4 mL Nebulization Q12H    tacrolimus  0.5 mg Oral BID    valGANciclovir  450 mg Oral BID    venlafaxine  150 mg Oral QHS    venlafaxine  37.5 mg Oral QHS    voriconazole  200 mg Oral BID     PRN Meds:acetaminophen, bisacodyl, Dextrose 10% Bolus, Dextrose 10% Bolus, diphenhydrAMINE, glucagon (human recombinant), glucose, glucose, insulin aspart U-100, insulin aspart U-100, lactulose, levalbuterol, lipase-protease-amylase 24,000-76,000-120,000 units, loperamide, LORazepam, magnesium sulfate IVPB, metoclopramide HCl, metoprolol, naloxone, ondansetron, ondansetron, oxyCODONE, potassium chloride in water **AND** potassium chloride in water **AND** potassium chloride in water    Review of patient's allergies indicates:   Allergen Reactions    Albuterol Palpitations    Colistin Anaphylaxis    Vancomycin analogues      Infusion reaction that does not resolve with slowing  Pt. States she can tolerate it when given with 50 mg Benadryl and ran over 3 hours    Neupogen [filgrastim] Other (See Comments)     Ostealgia after five daily doses of 300 mcg.      Bactrim [sulfamethoxazole-trimethoprim] Hives    Ceftazidime Hives     Pt stated can tolerate  cefapine not ceftazidime    Ceftazidime     Dronabinol Other (See Comments)     Mental changes/hallucinations    Haldol [haloperidol lactate] Other (See Comments)     Seizure like activity    Nsaids (non-steroidal anti-inflammatory drug)      Cannot have due to lung transplant    Adhesive Rash     Cloth tape- please use tegaderm or paper tape    Aztreonam Rash    Ciprofloxacin Nausea And Vomiting     Projectile N/V, per patient.  Unwilling to retry therapy.       Review of Systems   Constitutional: Negative for chills, diaphoresis and fever.   HENT: Negative for congestion, postnasal drip, rhinorrhea, sinus pressure and sinus pain.    Respiratory: Positive for cough and shortness of breath (with exertion). Negative for wheezing.    Cardiovascular: Positive for chest pain (incision site). Negative for palpitations and leg swelling.   Gastrointestinal: Negative for abdominal distention, abdominal pain, constipation, diarrhea, nausea and vomiting.   Genitourinary: Negative for decreased urine volume, difficulty urinating, dysuria and urgency.   Musculoskeletal: Positive for back pain (chronic). Negative for neck pain and neck stiffness.   Skin: Positive for pallor. Negative for rash and wound.   Allergic/Immunologic: Positive for immunocompromised state.   Neurological: Positive for weakness. Negative for dizziness, seizures, syncope and headaches.   Psychiatric/Behavioral: Negative for agitation, confusion and decreased concentration. The patient is nervous/anxious (chronic).      Objective:   Physical Exam   Constitutional: She is oriented to person, place, and time. She appears well-developed and well-nourished. She is easily aroused. No distress.   HENT:   Head: Normocephalic and atraumatic.   Nose: Nose normal.   Eyes: Conjunctivae and EOM are normal. No scleral icterus.   Neck: Normal range of motion. Neck supple. No JVD present.   Cardiovascular: Normal rate, regular rhythm and normal heart sounds. Exam  reveals no friction rub.   No murmur heard.  Pulmonary/Chest: Effort normal. No accessory muscle usage. No respiratory distress. She has decreased breath sounds in the right lower field and the left lower field. She has no wheezes. She has no rhonchi. She has no rales.   Abdominal: Soft. Bowel sounds are normal. She exhibits no distension. There is no tenderness.   Musculoskeletal: Normal range of motion. She exhibits no edema.   Neurological: She is alert, oriented to person, place, and time and easily aroused.   Skin: Skin is warm and dry. She is not diaphoretic. No erythema.   Psychiatric: Judgment normal. Her mood appears not anxious. Her speech is not delayed. She is not slowed. She exhibits normal recent memory.   Nursing note and vitals reviewed.        Vital Signs (Most Recent):  Temp: 98.4 °F (36.9 °C) (07/15/19 0930)  Pulse: 80 (07/15/19 0930)  Resp: 17 (07/15/19 0930)  BP: 120/70 (07/15/19 0930)  SpO2: (!) 93 % (07/15/19 0930) Vital Signs (24h Range):  Temp:  [98 °F (36.7 °C)-98.7 °F (37.1 °C)] 98.4 °F (36.9 °C)  Pulse:  [78-86] 80  Resp:  [16-20] 17  SpO2:  [92 %-95 %] 93 %  BP: (113-128)/(70-88) 120/70     Weight: 54.8 kg (120 lb 13 oz)  Body mass index is 22.83 kg/m².      Intake/Output Summary (Last 24 hours) at 7/15/2019 1101  Last data filed at 7/15/2019 0608  Gross per 24 hour   Intake 910 ml   Output 0 ml   Net 910 ml       Significant Labs:  CBC:  Recent Labs   Lab 07/15/19  0600   WBC 3.45*   RBC 2.98*   HGB 8.5*   HCT 28.6*   *   MCV 96   MCH 28.5   MCHC 29.7*     BMP:  Recent Labs   Lab 07/15/19  0600      K 3.6      CO2 26   BUN 16   CREATININE 0.8   CALCIUM 8.4*      Tacrolimus Levels:  Recent Labs   Lab 07/14/19  0600   TACROLIMUS 7.7     Microbiology:  Microbiology Results (last 7 days)     Procedure Component Value Units Date/Time    Fungus culture [[id number]]  (Abnormal) Collected:  07/05/19 1445    Order Status:  Completed Specimen:  Respiratory from Bronchial Wash  Updated:  07/11/19 0852     Fungus (Mycology) Culture BRANDI GLABRATA  Many      Narrative:       Bronchial Wash    Fungus culture [[id number]] Collected:  06/25/19 0900    Order Status:  Completed Specimen:  Respiratory from Bronchial Wash Updated:  07/10/19 0918     Fungus (Mycology) Culture Culture in progress      No fungus isolated after 2 weeks    Narrative:       Bronchial Wash    AFB Culture & Smear [[id number]] Collected:  07/05/19 1445    Order Status:  Completed Specimen:  Respiratory from Bronchial Wash Updated:  07/08/19 1540     AFB Culture & Smear Culture in progress     AFB CULTURE STAIN No acid fast bacilli seen.    Narrative:       Bronchial Wash          I have reviewed all pertinent labs within the past 24 hours.

## 2019-07-15 NOTE — PT/OT/SLP PROGRESS
"Speech Language Pathology Treatment    Patient Name:  Juanita Ibarra   MRN:  [id number]  Admitting Diagnosis: S/P lung transplant    Recommendations:      Diet recommendations:  Dental Soft, Liquid Diet Level: Nectar Thick   Aspiration Precautions: Assistance with thickening liquids, HOB to 90 degrees, No straws and Strict aspiration precautions   General Precautions: Standard, aspiration, fall  Communication strategies:  none    Subjective     "I cant do those excercises" per pt. Re swallowing excercises  Patient goals: home     Pain/Comfort:  · Pain Rating 1: 0/10    Objective:     Has the patient been evaluated by SLP for swallowing?   Yes  Keep patient NPO? No   Current Respiratory Status: room air      Pt. Seen at bedside with significant other present.  HOB was raised to 90 degree angle. When asked about performing daily swallowing excercises, pt.responded "I cant do that.".  Bottle of coca cola was present on tray table and pt. Reported that she has been taking small sips of coke without thickener".  Vocal quality and intensity were wfl with pt. And sig other stating her voice is at baseline and has made "significant improvement" over the weekend.  Swallowing precautions and results of mbs were reviewed with pt. Emphasizing that pt. Has h/o silent aspiration.  Per pt. , discharge planning has been initiated with plan to dc in 1-2 days.  Will discuss with md benefit of repeating mbs prior to dc.  Education provided to pt. Re poc         Assessment:     Juanita Ibarra is a 29 y.o. female with an SLP diagnosis of Dysphagia.    Goals:   Multidisciplinary Problems     SLP Goals        Problem: SLP Goal    Goal Priority Disciplines Outcome   SLP Goal     SLP Ongoing (interventions implemented as appropriate)   Description:  Speech Language Pathology Goals  Goals expected to be met by 7/4:  1. Patient will participate in ongoing swallow assessment.   2. When determined by SLP, patient may benefit " from a repeat modified barium swallow study to further objectively assess swallow function/safety.                         Plan:     · Patient to be seen:  2 x/week   · Plan of Care expires:  07/27/19  · Plan of Care reviewed with:  patient, caregiver   · SLP Follow-Up:  Yes       Discharge recommendations:  rehabilitation facility         Time Tracking:     SLP Treatment Date:   07/15/19  Speech Start Time:  1130  Speech Stop Time:  1145     Speech Total Time (min):  15 min    Billable Minutes: Treatment Swallowing Dysfunction 15    Shirley Pepe MA, CCC-SLP  07/15/2019

## 2019-07-15 NOTE — PLAN OF CARE
Problem: Physical Therapy Goal  Goal: Physical Therapy Goal  Goals to be met by: 19    Patient will increase functional independence with mobility by performin. Supine <> sit with stand-by assistance. -Met  2. Sit to stand transfer from bedside chair with contact guard assistance -met   Revised: Sit to stand transfer with Supervision   3. Pt to perform stand pivot transfer with Tonio, with use of RW. -met  4. Gait  x 50 feet with Tonio, with use of RW.-met   Gait x 300' with Supervision with use of RW without significant LOB   5.  Pt to perform and be compliant with exercise program in sitting to improve BLE strength and endurance   Outcome: Ongoing (interventions implemented as appropriate)  Goals #1, 2, 3, 4 met and revised. Continue progressing towards goals.

## 2019-07-15 NOTE — ASSESSMENT & PLAN NOTE
POD#27 s/p bilateral lung transplant (re-transplant) for CARLOS EDUARDO. 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 PGD 0. S/p bronch and extubated to [facility] on POD#2, but was re-intubated 6/21 for hypercapnic respiratory failure. Bronchoscopy 7/5 with  pseudomonas.     Continue PT/OT/CPT/nebs/IS. Continue immunosuppression and prophylaxis. Will plan for discharge tomorrow or Wednesday.

## 2019-07-15 NOTE — PROGRESS NOTES
"[facility]  Adult Nutrition  Progress Note    SUMMARY       Recommendations    Recommendation/Intervention: 1.) Continue ordered diet by SLP. 2.) Daily weights.   Goals: 1.) Pt to consume/tolerate >75% of meals by follow up.   Nutrition Goal Status: progressing towards goal  Communication of RD Recs: (POC)    Reason for Assessment    Reason For Assessment: RD follow-up  Diagnosis: transplant/postoperative complications(s/p redo BLTx 6/18)  Relevant Medical History: CF s/p BLTx 6/2014, HTN, seizures  Interdisciplinary Rounds: did not attend  General Information Comments: Pt sleeping with spouse to bedside. Spouse reports pt consuming ~25% of meals He states pt dislikes nectar thickened liquids. Deferred texture of diet to SLP. No GI distress stated. NFPE unable to be performed at this time 2/2 to pt sleeping. Will attempt at follow up. Pt continues to appear well nourished. Noted 24# wt change since last nutrition note; likely bed scale error. Will monitor.   Nutrition Discharge Planning: Adequate intake to meet nutritional needs.     Nutrition Risk Screen    Nutrition Risk Screen: dysphagia or difficulty swallowing    Nutrition/Diet History    Spiritual, Cultural Beliefs, Scientology Practices, Values that Affect Care: no  Factors Affecting Nutritional Intake: NPO, difficulty/impaired swallowing    Anthropometrics    Temp: 98.4 °F (36.9 °C)  Height Method: Stated  Height: 5' 1" (154.9 cm)  Height (inches): 61 in  Weight Method: Bed Scale  Weight: 54.8 kg (120 lb 13 oz)  Weight (lb): 120.81 lb  Ideal Body Weight (IBW), Female: 105 lb  % Ideal Body Weight, Female (lb): 123.77 lb  BMI (Calculated): 24.6  BMI Grade: 18.5-24.9 - normal       Lab/Procedures/Meds    Pertinent Labs Reviewed: reviewed  Pertinent Labs Comments: Ca 8.4,   Pertinent Medications Reviewed: reviewed  Pertinent Medications Comments: insulin, lipase-amylase-protease, morphine, myucophenolate, prednisone, " tacrolimus      Estimated/Assessed Needs    Weight Used For Calorie Calculations: 65.7 kg (144 lb 13.5 oz)  Energy Calorie Requirements (kcal): 1643  Energy Need Method: [place]-St Jeor(x 1.25 PAL)  Protein Requirements: 79-99(g/day)  Weight Used For Protein Calculations: 65.7 kg (144 lb 13.5 oz)(1.2-1.5 g/kg)  Fluid Requirements (mL): 1 mL/kcal or per MD  Estimated Fluid Requirement Method: RDA Method(or per MD)  RDA Method (mL): 1643         Nutrition Prescription Ordered    Current Diet Order: mechanical soft  Nutrition Order Comments: nectar thick  Current Nutrition Support Formula Ordered: (-)  Current Nutrition Support Rate Ordered: 0 (ml)  Current Nutrition Support Frequency Ordered: -    Evaluation of Received Nutrient/Fluid Intake    Enteral Calories (kcal): 0  Enteral Protein (gm): 0  Enteral (Free Water) Fluid (mL): 0  Other Calories (kcal): 0  Total Calories (kcal): 0  % Kcal Needs: 0  % Protein Needs: 0  I/O: +2.1L since 7/1  Energy Calories Required: (-)  Protein Required: (-)  Fluid Required: (-)  Comments: LBM 7/14  Tolerance: (-)  % Intake of Estimated Energy Needs: 0 - 25 %  % Meal Intake: 0 - 25 %    Nutrition Risk    Level of Risk/Frequency of Follow-up: low     Assessment and Plan  Nutrition Problem  Increased nutrient needs  Swallowing difficulty     Related to (etiology):   Physiological causes related to healing  Mechanical causes from surgery     Signs and Symptoms (as evidenced by):   S/p redo BLTx 6/18   Abnormal findings from barium swallow evaluation     Interventions/Recommendations (treatment strategy):  Collaboration and referral of nutrition care     Nutrition Diagnosis Status:   Continues   Progressing     Nutrition Problem  Inadequate energy intake     Related to (etiology):   Decreased ability to consume sufficient energy     Signs and Symptoms (as evidenced by):   NPO with no alternative means of nutrition at this time     Interventions/Recommendations (treatment  strategy):  Collaboration of nutrition care with other providers     Nutrition Diagnosis Status:   Resolved       Monitor and Evaluation    Food and Nutrient Intake: energy intake, food and beverage intake  Food and Nutrient Adminstration: diet order  Knowledge/Beliefs/Attitudes: food and nutrition knowledge/skill  Physical Activity and Function: nutrition-related ADLs and IADLs  Anthropometric Measurements: weight, weight change, body mass index  Biochemical Data, Medical Tests and Procedures: electrolyte and renal panel, gastrointestinal profile, glucose/endocrine profile  Nutrition-Focused Physical Findings: overall appearance     Malnutrition Assessment   Continues to appear well nourished     Nutrition Follow-Up    RD Follow-up?: Yes

## 2019-07-16 NOTE — ASSESSMENT & PLAN NOTE
BG goal 140 - 180    Discontinue scheduled insulin.   Change to low dose correction scale.   BG monitoring AC/HS  Start januvia 100 mg daily; No history of pancreatitis or medullary thyroid CA.       Discharge planning: januvia 100 mg daily. BG logs. Follow up in 1-2 weeks in discharge clinic.

## 2019-07-16 NOTE — PROGRESS NOTES
[facility]  Lung Transplant  Progress Note - Floor    Patient Name: Juanita Ibarra  MRN: [id number]  Admission Date: 6/17/2019  Hospital Length of Stay: 28 days  Post-Operative Day: 1860 (Lung), 28 (Lung)  Attending Physician: Francisca Morin DO  Primary Care Provider: Primary Doctor No     Subjective:     Interval History: No acute events overnight.  Plan for discharge tomorrow.    Continuous Infusions:  Scheduled Meds:   ceftolozane-tazobactam  1,500 mg Intravenous Q8H    cetirizine  10 mg Oral Daily    dapsone  100 mg Oral Daily    enoxaparin  40 mg Subcutaneous Daily    gabapentin  300 mg Oral TID    insulin aspart U-100  3-5 Units Subcutaneous TIDWM    levalbuterol  1.25 mg Nebulization Q6H WAKE    lipase-protease-amylase 24,000-76,000-120,000 units  6 capsule Oral TID WM    metoprolol tartrate  25 mg Oral TID    micafungin (MYCAMINE) IVPB  100 mg Intravenous Q24H    morphine  15 mg Oral Q12H    mycophenolate  250 mg Oral BID    predniSONE  20 mg Oral Daily    QUEtiapine  50 mg Oral QHS    sodium chloride 7%  4 mL Nebulization Q12H    tacrolimus  0.5 mg Oral BID    valGANciclovir  450 mg Oral BID    venlafaxine  150 mg Oral QHS    venlafaxine  37.5 mg Oral QHS    voriconazole  200 mg Oral BID     PRN Meds:acetaminophen, bisacodyl, Dextrose 10% Bolus, Dextrose 10% Bolus, diphenhydrAMINE, glucagon (human recombinant), glucose, glucose, insulin aspart U-100, insulin aspart U-100, lactulose, levalbuterol, lipase-protease-amylase 24,000-76,000-120,000 units, loperamide, LORazepam, magnesium sulfate IVPB, metoclopramide HCl, metoprolol, naloxone, ondansetron, ondansetron, oxyCODONE, potassium chloride in water **AND** potassium chloride in water **AND** potassium chloride in water    Review of patient's allergies indicates:   Allergen Reactions    Albuterol Palpitations    Colistin Anaphylaxis    Vancomycin analogues      Infusion reaction that does not resolve  with slowing  Pt. States she can tolerate it when given with 50 mg Benadryl and ran over 3 hours    Neupogen [filgrastim] Other (See Comments)     Ostealgia after five daily doses of 300 mcg.      Bactrim [sulfamethoxazole-trimethoprim] Hives    Ceftazidime Hives     Pt stated can tolerate cefapine not ceftazidime    Ceftazidime     Dronabinol Other (See Comments)     Mental changes/hallucinations    Haldol [haloperidol lactate] Other (See Comments)     Seizure like activity    Nsaids (non-steroidal anti-inflammatory drug)      Cannot have due to lung transplant    Adhesive Rash     Cloth tape- please use tegaderm or paper tape    Aztreonam Rash    Ciprofloxacin Nausea And Vomiting     Projectile N/V, per patient.  Unwilling to retry therapy.       Review of Systems   Constitutional: Negative for chills, diaphoresis and fever.   HENT: Negative for congestion, postnasal drip, rhinorrhea, sinus pressure and sinus pain.    Respiratory: Positive for cough and shortness of breath (with exertion). Negative for wheezing.    Cardiovascular: Negative for chest pain, palpitations and leg swelling.   Gastrointestinal: Negative for abdominal distention, abdominal pain, constipation, diarrhea, nausea and vomiting.   Genitourinary: Negative for decreased urine volume, difficulty urinating, dysuria and urgency.   Musculoskeletal: Positive for back pain (chronic). Negative for neck pain and neck stiffness.   Skin: Positive for pallor. Negative for rash and wound.   Allergic/Immunologic: Positive for immunocompromised state.   Neurological: Positive for weakness (due to deconditioning). Negative for dizziness, seizures, syncope and headaches.   Psychiatric/Behavioral: Negative for agitation, confusion and decreased concentration. The patient is nervous/anxious (chronic).      Objective:   Physical Exam   Constitutional: She is oriented to person, place, and time. She appears well-developed and well-nourished. She is easily  aroused. No distress.   HENT:   Head: Normocephalic and atraumatic.   Nose: Nose normal.   Eyes: Conjunctivae and EOM are normal. No scleral icterus.   Neck: Normal range of motion. Neck supple. No JVD present.   Cardiovascular: Normal rate, regular rhythm and normal heart sounds. Exam reveals no friction rub.   No murmur heard.  Pulmonary/Chest: Effort normal. No accessory muscle usage. No respiratory distress. She has no wheezes. She has no rhonchi. She has no rales.   Abdominal: Soft. Bowel sounds are normal. She exhibits no distension. There is no tenderness.   Musculoskeletal: Normal range of motion. She exhibits no edema.   Neurological: She is alert, oriented to person, place, and time and easily aroused.   Skin: Skin is warm and dry. She is not diaphoretic. No erythema.   Psychiatric: Judgment normal. Her mood appears not anxious. Her speech is not delayed. She is not slowed. She exhibits normal recent memory.   Nursing note and vitals reviewed.        Vital Signs (Most Recent):  Temp: 98.7 °F (37.1 °C) (07/16/19 0856)  Pulse: 88 (07/16/19 0856)  Resp: 13 (07/16/19 0856)  BP: 137/79 (07/16/19 0856)  SpO2: (!) 91 % (07/16/19 0856) Vital Signs (24h Range):  Temp:  [98.3 °F (36.8 °C)-98.9 °F (37.2 °C)] 98.7 °F (37.1 °C)  Pulse:  [] 88  Resp:  [13-19] 13  SpO2:  [91 %-96 %] 91 %  BP: (115-137)/(74-86) 137/79     Weight: 56.3 kg (124 lb 1.9 oz)  Body mass index is 23.45 kg/m².      Intake/Output Summary (Last 24 hours) at 7/16/2019 1028  Last data filed at 7/16/2019 0618  Gross per 24 hour   Intake 800 ml   Output 2 ml   Net 798 ml       Significant Labs:  CBC:  Recent Labs   Lab 07/16/19  0600   WBC 3.71*   RBC 3.06*   HGB 8.7*   HCT 29.6*      MCV 97   MCH 28.4   MCHC 29.4*     BMP:  Recent Labs   Lab 07/16/19  0600      K 4.2      CO2 24   BUN 14   CREATININE 0.9   CALCIUM 8.7      Tacrolimus Levels:  Recent Labs   Lab 07/15/19  0600   TACROLIMUS 7.2     Microbiology:  Microbiology  Results (last 7 days)     Procedure Component Value Units Date/Time    Fungus culture [[id number]]  (Abnormal) Collected:  06/18/19 0930    Order Status:  Completed Specimen:  Tissue from Lung, Left Updated:  07/15/19 1339     Fungus (Mycology) Culture CANDIDA ALBICANS  Moderate      Narrative:       Donor lung    Fungus culture [[id number]]  (Abnormal) Collected:  07/05/19 1445    Order Status:  Completed Specimen:  Respiratory from Bronchial Wash Updated:  07/11/19 0852     Fungus (Mycology) Culture BRANDI GLABRATA  Many      Narrative:       Bronchial Wash    Fungus culture [[id number]] Collected:  06/25/19 0900    Order Status:  Completed Specimen:  Respiratory from Bronchial Wash Updated:  07/10/19 0918     Fungus (Mycology) Culture Culture in progress      No fungus isolated after 2 weeks    Narrative:       Bronchial Wash          I have reviewed all pertinent labs within the past 24 hours.      Assessment/Plan:     * S/P lung transplant  POD#28 s/p bilateral lung transplant (re-transplant) for CARLOS EDUARDO. 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 PGD 0. S/p bronch and extubated to HFNC on POD#2, but was re-intubated 6/21 for hypercapnic respiratory failure. Bronchoscopy 7/5 with  pseudomonas.   Continue PT/OT/CPT/nebs/IS. Continue immunosuppression and prophylaxis. Will plan for discharge tomorrow.    Immunosuppression  Previously on tacrolimus, MMF, and daily prednisone. S/p solumedrol in OR, basiliximab on POD#0 and POD#4. Continue MMF, steroid taper, and tacrolimus.  Monitor daily tacrolimus levels and adjust dose as needed.       Prophylactic antibiotic  CMV D-/R+. Received Vanc/Merrem as routine surgical prophylaxis, which was transitioned to Cipro. Continue OIP with valganciclovir and dapsone. Continue micafungin for Candida glabrata. Continue voriconazole for aspergillus.    CF related Pancreatic insufficiency  Continue Creon with meals and snacks.     Anxiety disorder  Continue home dose of Effexor and  Ativan. Psych consulted for assistance, appreciate recs.    Acute blood loss anemia  Received 6u PRBCs, 2u plts, 1 cryo, and 4 FFP angie-operatively. Will continue to monitor and be conservative with future transfusions.     Chronic pain with opiate use  Transitioned to home regimen of MS Contin 15 mg BID.  Oxy 10 mg Q6hrs PRN. Limit sedating medications.      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 Received Tobramycin x1 on 6/22 and was transitioned from Merrem to Cipro. BAL from 7/5 with  Pseudomonas. Started on Zerbaxa 7/9. ID following, appreciate recs.  Will order home IV infusion of Zerbaxa through 7/24.    Adrenal cortical steroids causing adverse effect in therapeutic use  Endocrinology consulted, appreciate recs.    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 Repeat BAL on 6/23 with Candida glabrata. Continue micafungin per ID recs.    Aspergillus  Positive aspergillus ag from BAL on 7/5. Continue voriconazole.        Reyes Will NP  Lung Transplant  [facility]

## 2019-07-16 NOTE — SUBJECTIVE & OBJECTIVE
"Interval HPI:   Overnight events: Remains in TSU. NAEON. Tentative discharge today. BG reasonably well controlled with scheduled insulin and correction scale coverage. Prednisone 20 mg daily; steroids per primary.   Eatin-100% - outside food   Nausea: No  Hypoglycemia and intervention: Yes 60 glucose tabs   Fever: No  TPN and/or TF: No      /88   Pulse 83   Temp 98.4 °F (36.9 °C) (Oral)   Resp 14   Ht 5' 1" (1.549 m)   Wt 56.3 kg (124 lb 1.9 oz)   LMP 10/02/2016   SpO2 97%   Breastfeeding? No   BMI 23.45 kg/m²     Labs Reviewed and Include    Recent Labs   Lab 19  0500   GLU 97   CALCIUM 8.4*   ALBUMIN 2.4*   PROT 5.5*      K 4.3   CO2 25      BUN 12   CREATININE 0.8   ALKPHOS 326*   ALT 48*   AST 48*   BILITOT 0.3     Lab Results   Component Value Date    WBC 3.78 (L) 2019    HGB 8.5 (L) 2019    HCT 28.5 (L) 2019    MCV 97 2019     2019     No results for input(s): TSH, FREET4 in the last 168 hours.  Lab Results   Component Value Date    HGBA1C 4.7 2019       Nutritional status:   Body mass index is 23.45 kg/m².  Lab Results   Component Value Date    ALBUMIN 2.4 (L) 2019    ALBUMIN 2.7 (L) 2019    ALBUMIN 2.5 (L) 07/15/2019     Lab Results   Component Value Date    PREALBUMIN 15 (L) 2014    PREALBUMIN 11 (L) 2014    PREALBUMIN 18 (L) 2014       Estimated Creatinine Clearance: 77.6 mL/min (based on SCr of 0.8 mg/dL).    Accu-Checks  Recent Labs     19  1332 19  2039 07/15/19  0858 07/15/19  1152 07/15/19  2059 19  0908 19  1237 19  1757 19  2239 19  2325   POCTGLUCOSE 192* 231* 109 140* 246* 139* 171* 368* 60* 126*       Current Medications and/or Treatments Impacting Glycemic Control  Immunotherapy:    Immunosuppressants         Stop Route Frequency     mycophenolate capsule 250 mg      -- Oral 2 times daily     tacrolimus capsule 0.5 mg      -- Oral 2 times daily "        Steroids:   Hormones (From admission, onward)    Start     Stop Route Frequency Ordered    07/02/19 0900  predniSONE tablet 20 mg      -- Oral Daily 07/01/19 1148    06/26/19 1827  dexAMETHasone sodium phos (PF) 10 mg/mL injection     Note to Pharmacy:  Created by cabinet override    06/27 0629 06/26/19 1827        Pressors:    Autonomic Drugs (From admission, onward)    None        Hyperglycemia/Diabetes Medications:   Antihyperglycemics (From admission, onward)    Start     Stop Route Frequency Ordered    07/17/19 0930  SITagliptin tablet 100 mg      -- Oral Daily 07/17/19 0818    07/17/19 0916  insulin aspart U-100 pen 0-5 Units      -- SubQ Before meals & nightly PRN 07/17/19 0817

## 2019-07-16 NOTE — ASSESSMENT & PLAN NOTE
Previously with  Pseudomonas from 6/20 and 6/23 bronchial wash. Received Tobramycin x1 on 6/22 and was transitioned from Merrem to Cipro. BAL from 7/5 with  Pseudomonas. Started on Zerbaxa 7/9. ID following, appreciate recs.  Will order home IV infusion of Zerbaxa through 7/24.

## 2019-07-16 NOTE — NURSING
"Notified JOSE Will NP, of pt c/o dull chest pain "that she has never had before, just started today".  "

## 2019-07-16 NOTE — PROCEDURES
Radiology Post-Procedure Note    Pre Op Diagnosis: Lung transplant. Poor venous access  Post Op Diagnosis: Same    Procedure: Left IJ tunneled CVL removal    Procedure performed by: Hector Dow MD    Written Informed Consent Obtained: Yes  Specimen Removed: YES   Estimated Blood Loss: Minimal    Findings:   Left IJ tunneled CVL removed in its entirety. Hemostasia achieved.    Patient tolerated procedure well.    Hector Dow MD  Department of Radiology  Pager: [phone]

## 2019-07-16 NOTE — PT/OT/SLP PROGRESS
Physical Therapy      Patient Name:  Juanita Ibarra   MRN:  [id number]    Patient not seen today secondary to (pt. away at IR). Will follow-up tomorrow.    Wiliam Bailey, PT   7/16/2019

## 2019-07-16 NOTE — H&P
Inpatient Radiology Pre-procedure Note    History of Present Illness:  Juanita Ibarra is a 29 y.o. female who presents for tunneled right sided PICC line removal.  Admission H&P reviewed.  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     Past Surgical History:   Procedure Laterality Date    ABDOMINAL SURGERY      peg tube     [id number] N/A 5/19/2015    Performed by Deny Dias Jr., MD at [facility] [place] OR    BIOPSY-BRONCHUS N/A 12/20/2016    Performed by Jake Alvarez MD at [facility] OR 2ND FLR    Bronchoscopy N/A 7/5/2019    Performed by Francisca Morin DO at [facility] OR 2ND FLR    BRONCHOSCOPY Bilateral 12/11/2018    Performed by Francisca Morin DO at [facility] OR 2ND FLR    BRONCHOSCOPY N/A 10/1/2018    Performed by Jake Alvarez MD at [facility] OR 2ND FLR    BRONCHOSCOPY Bilateral 12/20/2016    Performed by Jake Alvarez MD at [facility] OR 2ND FLR    BRONCHOSCOPY - flexible bronchoscopy with probable tissue biopsy CPT 99713 N/A 7/21/2015    Performed by Jake Alvarez MD at [facility] OR 2ND FLR    BRONCHOSCOPY - flexible bronchoscopy with probable tissue biospy CPT 60503 N/A 10/20/2015    Performed by Jake Alvarez MD at [facility] OR 2ND FLR    BRONCHOSCOPY - flexible bronchoscopy with tissue biopsy CPT 96836 N/A 9/29/2015    Performed by Jake Alvarez MD at [facility] OR 2ND FLR    BRONCHOSCOPY - flexible bronchoscopy with tissue biopsy CPT 80044 N/A 5/26/2015    Performed by Jake Alvarez MD at [facility] OR 1ST FLR    BRONCHOSCOPY flexible bronchoscopy with tissue  biopsy N/A 9/8/2014    Performed by Jake Alvarez MD at [facility] OR 2ND FLR    BRONCHOSCOPY flexible with possible tissue biopsy N/A 8/8/2014    Performed by Jake Alvarez MD at [facility] OR 2ND FLR    BRONCHOSCOPY, BAL, BIOPSIES N/A 3/20/2018    [facility]    BRONCHOSCOPY-FIBEROPTIC N/A 1/29/2016    Performed by Joann Cifuentes DO at [facility] OR [facility] FLR    BRONCHOSCOPY; Bronchoscopy with BAL and transbronchial biopsies under general anesthesia N/A 5/29/2018    [facility]    CHOLECYSTECTOMY  2016    CHOLECYSTECTOMY-LAPAROSCOPIC N/A 7/22/2016    [facility]    COLONOSCOPY N/A 5/3/2019    Performed by Juancho Rich MD at [facility] ENDO (2ND FLR)    ENDOMETRIAL ABLATION  2015    KJB    ETHMOIDECTOMY Bilateral 4/9/2019    [facility]    FESS      FESS Bilateral 10/21/2013    [facility]    FESS, USING COMPUTER-ASSISTED NAVIGATION N/A 4/9/2019    Performed by Adin Burks III, MD at [facility] OR [address] [place] [address]    FINE NEEDLE ASPIRATION (FNA)  of a cervical lymphadenopathy  1/29/2016    Performed [facility] oJann [facility]    flex bronchoscopy with probable tissue biopsy N/A 7/31/2014    Performed by [facility] Diagnostic Provider at [facility] OR [facility] FLR    flexible bronchoscopy with tissue biopsy N/A 12/11/2014    [facility]    HYSTERECTOMY  04/2017    [facility]    INJECTION-VOCAL CORD Left 6/24/2014    Performed by Jared Whatley MD at [facility] OR [address] [place] [address]    LNILNRTHC-IXRF-P-CATH to right chest and removal of portacath to left chests wall. Bilateral 6/24/2014    [facility]    LARYNX SURGERY  2016    LUNG TRANSPLANT Bilateral 6/12/14    MAXILLARY ANTROSTOMY  4/9/2019    Performed by Adin Burks III, MD at [facility] OR [facility]  FLR    MYRINGOTOMY W/ TUBES Right 04/2017    MYRINGOTOMY WITH INSERTION OF PE TUBES Right 4/15/2017    Performed by Gary Null MD at [facility] OR [facility] FLR    PLACEMENT-TUBE-PEG  5/15/2014    [facility]    PORTACATH PLACEMENT Right     rt sc    REDO BILATERAL LUNG TRANSPLANT; CLAMSHELL Bilateral 6/18/2019    [facility]    REMOVAL-TUBE-PEG  5/15/2014    [facility]    RHC for lung re-transplant N/A 1/22/2019    Performed by Laura Rachel MD at [facility] CATH LAB    ROBOTIC ASSISTED LAPAROSCOPIC HYSTERECTOMY N/A 4/25/2017    Performed by Deny Dias Jr., MD at [facility] [place] OR    SALPINGECTOMY Bilateral 2015    KJB    SALPINGECTOMY-LAPAROSCOPIC Bilateral 5/19/2015    Performed by Deny Dias Jr., MD at [facility] [place]    SINUS SURGERY FUNCTIONAL ENDOSCOPIC WITH NAVIGATION Bilateral 4/3/2017    Performed by Cesar Olsen MD at [facility] OR [address] [place] [address]    SINUS SURGERY FUNCTIONAL ENDOSCOPIC WITH NAVIGATION, 71709, 52020, 89595, 09800 Bilateral 2/5/2015    [facility]    SPHENOIDECTOMY Bilateral 4/9/2019    Performed by [place] GUSTABO Burks III, MD at [facility] OR [address] [place] [address]    THYROPLASTY - Medialization laryngoplasty, cricothyroid subluxation, arytenoid repositioning Left 8/2/2016    Performed by Gary Null MD at [facility] OR [facility] FLR    TRANSPLANT-LUNG Bilateral 6/11/2014    Performed by Adolfo Huston MD at [facility] OR [address] [place] [address]       Review of Systems:   As documented in primary team H&P    Home Meds:   Prior to Admission medications    Medication Sig Start Date End Date Taking? Authorizing Provider   metoprolol tartrate (LOPRESSOR) 25 MG tablet Take 1 tablet (25 mg total) by mouth 2 (two) times daily. 12/21/18 12/21/19 Yes Jake Alvarez MD   amLODIPine (NORVASC) 10 MG tablet Take 1 tablet (10 mg total) by mouth once daily. 11/15/18 11/15/19  Jake  MD Antonio   butalbital-acetaminophen-caffeine -40 mg (FIORICET, ESGIC) -40 mg per tablet Take 1 tablet by mouth every 6 (six) hours as needed for Pain. 6/12/19   Jake Alvarez MD   calcium-vitamin D3 (OS-KATY 500 + D3) 500 mg(1,250mg) -200 unit per tablet Take 1 tablet by mouth 2 (two) times daily with meals. 11/26/18   Francisca Morin,    dapsone 100 MG Tab Take 1 tablet (100 mg total) by mouth once daily. 7/15/19   Jake Alvarez MD   diphenhydrAMINE (BENADRYL) 50 MG tablet Take 1 tablet (50 mg total) by mouth every 6 (six) hours as needed for Itching. 8/19/14   Jake Alvarez MD   fexofenadine (ALLEGRA) 180 MG tablet Take 180 mg by mouth once daily.    Historical Provider, MD   fluconazole (DIFLUCAN) 150 MG Tab TAKE 1 TABLET BY MOUTH EVERY WEEK 8/13/18   Jake Alvarez MD   fluticasone propionate (FLONASE) 50 mcg/actuation nasal spray INSERT 2 SPRAYS IN EACH NOSTRIL DAILY 11/26/18   Francisca Morin,    folic acid (FOLVITE) 1 MG tablet Take 1 tablet (1,000 mcg total) by mouth once daily. 7/28/18   Jake Alvarez MD   gabapentin (NEURONTIN) 300 MG capsule Take 1 capsule (300 mg total) by mouth 3 (three) times daily. 4/18/19   Jake Alvarez MD   inhalation spacing device (PROCHAMBER) USE AS DIRECTED 4/2/19   Jake Alvarez MD   levalbuterol (XOPENEX HFA) 45 mcg/actuation inhaler Inhale 1-2 puffs into the lungs. 11/28/18 11/28/19  Historical Provider, MD   levalbuterol (XOPENEX) 1.25 mg/3 mL nebulizer solution Take 3 mLs (1.25 mg total) by nebulization every 8 (eight) hours as needed for Wheezing or Shortness of Breath. Rescue 12/12/18 12/12/19  Francisca Morin DO   lipase-protease-amylase (CREON) 36,000-114,000- 180,000 unit CpDR Take 4 capsules by mouth 3 (three) times daily with meals. Take 3 with snacks as needed.  Patient taking differently: Take 5 capsules by mouth 3 (three) times daily with meals.  5/8/19   Jake Alvarez MD   LORazepam  (ATIVAN) 2 MG Tab Take 1 tablet by mouth every 8 hours as needed for ANXIETY 5/14/19   Julio Berry MD   magnesium oxide (MAG-OX) 400 mg (241.3 mg magnesium) tablet Take 1 tablet (400 mg total) by mouth 2 (two) times daily. 1/29/19   Francisca Morin DO   mometasone-formoterol (DULERA) 100-5 mcg/actuation HFAA Inhale 1 puff into the lungs 2 (two) times daily. Controller 5/30/19   Francisca Morin DO   montelukast (SINGULAIR) 10 mg tablet Take 1 tablet (10 mg total) by mouth once daily. 7/28/18   Jake Alvarez MD   morphine (MS CONTIN) 15 MG 12 hr tablet Take 1 tablet by mouth twice a day 6/16/19      multivit,min52-folic-vitK-cQ10 (AQUADEKS) 100-700-10 mcg-mcg-mg Cap cap 1 tab twice daily 6/5/18   Jake Alvarez MD   multivitamin-minerals no.55 (CENTRUM FLAVOR BURST ADULT) Chew  2/14/18   Historical Provider, MD   mycophenolate (CELLCEPT) 250 mg Cap Take 2 capsules (500 mg total) by mouth 2 (two) times daily. 12/21/18   Jake Alvarez MD   omeprazole (PRILOSEC) 40 MG capsule Take 40 mg by mouth every morning.    Historical Provider, MD   ondansetron (ZOFRAN) 8 MG tablet Take 1 tablet (8 mg total) by mouth every 12 (twelve) hours as needed for Nausea. 5/3/19   Francisca Morin DO   oxycodone (ROXICODONE) 5 MG immediate release tablet Take 10 mg by mouth every 4 (four) hours as needed for Pain.    Historical Provider, MD   oxyCODONE (ROXICODONE) 5 MG immediate release tablet take 1 tablet by mouth twice a day 6/16/19      polyethylene glycol (GLYCOLAX) 17 gram PwPk Take 17 g by mouth 2 (two) times daily. 12/22/16   Jake Alvarez MD   predniSONE (DELTASONE) 5 MG tablet Take 1 tablet (5 mg total) by mouth once daily. 7/28/18   Jake Alvarez MD   promethazine (PHENERGAN) 25 MG tablet Take 1 tablet (25 mg total) by mouth every 6 (six) hours as needed for Nausea. 4/2/19   Jake Alvarez MD   QUEtiapine (SEROQUEL) 25 MG Tab Take 1 tablet by mouth in morning and 2 tablets at  bedtime 5/14/19   Juloi Berry MD   sodium chloride 3% 3 % nebulizer solution Take 4 mLs by nebulization once daily. 6/10/19   Jake Alvarez MD   tacrolimus (PROGRAF) 0.5 MG Cap Take 1 capsule (0.5 mg total) by mouth every 12 (twelve) hours. 3/1/19   Francisca Morin DO   tacrolimus (PROGRAF) 1 MG Cap Take 2 capsules (2 mg total) by mouth every 12 (twelve) hours. Daily doses: 2.5 mg twice daily. 11/21/18   Francisca Morin DO   tiZANidine (ZANAFLEX) 4 MG tablet TAKE 2 TABLETS BY MOUTH EVERY 6 HOURS AS NEEDED 2/21/19   Jake Alvarez MD   tobramycin with nebulizer 300 mg/5 mL Nebu Take 300 mg by nebulization every 12 (twelve) hours. 6/7/19   Jake Alvarez MD   topiramate (TOPAMAX) 25 MG tablet Take 1 tablet (25 mg total) by mouth 2 (two) times daily 5/3/19 5/2/20  Francisca Morin DO   valGANciclovir (VALCYTE) 450 mg Tab Take 1 tablet (450 mg total) by mouth 2 (two) times daily. 7/15/19 7/14/20  Jake Alvarez MD   venlafaxine (EFFEXOR-XR) 150 MG Cp24 Take 1 capsule (150 mg total) by mouth once daily. 5/14/19   Julio Berry MD   venlafaxine (EFFEXOR-XR) 37.5 MG 24 hr capsule Take 1 capsule (37.5 mg total) by mouth once daily. 5/14/19   Julio Berry MD   voriconazole (VFEND) 200 MG Tab Take 1 tablet (200 mg total) by mouth 2 (two) times daily. 7/15/19   Jake Alvarez MD     Scheduled Meds:    ceftolozane-tazobactam  1,500 mg Intravenous Q8H    cetirizine  10 mg Oral Daily    dapsone  100 mg Oral Daily    enoxaparin  40 mg Subcutaneous Daily    gabapentin  300 mg Oral TID    insulin aspart U-100  3-5 Units Subcutaneous TIDWM    levalbuterol  1.25 mg Nebulization Q6H WAKE    lipase-protease-amylase 24,000-76,000-120,000 units  6 capsule Oral TID WM    metoprolol tartrate  25 mg Oral TID    micafungin (MYCAMINE) IVPB  100 mg Intravenous Q24H    morphine  15 mg Oral Q12H    mycophenolate  250 mg Oral BID    predniSONE  20 mg Oral Daily    QUEtiapine  50  mg Oral QHS    sodium chloride 7%  4 mL Nebulization Q12H    tacrolimus  0.5 mg Oral BID    valGANciclovir  450 mg Oral BID    venlafaxine  150 mg Oral QHS    venlafaxine  37.5 mg Oral QHS    voriconazole  200 mg Oral BID     Continuous Infusions:   PRN Meds:acetaminophen, bisacodyl, Dextrose 10% Bolus, Dextrose 10% Bolus, diphenhydrAMINE, glucagon (human recombinant), glucose, glucose, insulin aspart U-100, insulin aspart U-100, lactulose, levalbuterol, lipase-protease-amylase 24,000-76,000-120,000 units, loperamide, LORazepam, magnesium sulfate IVPB, metoclopramide HCl, metoprolol, naloxone, ondansetron, ondansetron, oxyCODONE, potassium chloride in water **AND** potassium chloride in water **AND** potassium chloride in water  Anticoagulants/Antiplatelets: no anticoagulation    Allergies:   Review of patient's allergies indicates:   Allergen Reactions    Albuterol Palpitations    Colistin Anaphylaxis    Vancomycin analogues      Infusion reaction that does not resolve with slowing  Pt. States she can tolerate it when given with 50 mg Benadryl and ran over 3 hours    Neupogen [filgrastim] Other (See Comments)     Ostealgia after five daily doses of 300 mcg.      Bactrim [sulfamethoxazole-trimethoprim] Hives    Ceftazidime Hives     Pt stated can tolerate cefapine not ceftazidime    Ceftazidime     Dronabinol Other (See Comments)     Mental changes/hallucinations    Haldol [haloperidol lactate] Other (See Comments)     Seizure like activity    Nsaids (non-steroidal anti-inflammatory drug)      Cannot have due to lung transplant    Adhesive Rash     Cloth tape- please use tegaderm or paper tape    Aztreonam Rash    Ciprofloxacin Nausea And Vomiting     Projectile N/V, per patient.  Unwilling to retry therapy.     Sedation Hx: have not been any systemic reactions    Labs:  No results for input(s): INR in the last 168 hours.    Invalid input(s):  PT,  PTT    Recent Labs   Lab 07/16/19  0600   WBC  3.71*   HGB 8.7*   HCT 29.6*   MCV 97         Recent Labs   Lab 07/16/19  0600   *      K 4.2      CO2 24   BUN 14   CREATININE 0.9   CALCIUM 8.7   MG 1.3*   ALT 17   AST 14   ALBUMIN 2.7*   BILITOT 0.3         Vitals:  Temp: 98.7 °F (37.1 °C) (07/16/19 0856)  Pulse: 81 (07/16/19 1251)  Resp: 18 (07/16/19 1251)  BP: 137/79 (07/16/19 0856)  SpO2: (!) 93 % (07/16/19 1241)     Physical Exam:  ASA: 3  Mallampati: 3    General: no acute distress  Mental Status: alert and oriented to person, place and time  HEENT: normocephalic, atraumatic  Chest: unlabored breathing  Heart: no heaves  Abdomen: nondistended  Extremity: moves all extremities, bruising noted.    Plan: tunneled PICC removal.  Sedation Plan: local only    Jonathan Mendoza MD  Radiology

## 2019-07-16 NOTE — SUBJECTIVE & OBJECTIVE
Subjective:     Interval History: No acute events overnight.  Plan for discharge tomorrow.    Continuous Infusions:  Scheduled Meds:   ceftolozane-tazobactam  1,500 mg Intravenous Q8H    cetirizine  10 mg Oral Daily    dapsone  100 mg Oral Daily    enoxaparin  40 mg Subcutaneous Daily    gabapentin  300 mg Oral TID    insulin aspart U-100  3-5 Units Subcutaneous TIDWM    levalbuterol  1.25 mg Nebulization Q6H WAKE    lipase-protease-amylase 24,000-76,000-120,000 units  6 capsule Oral TID WM    metoprolol tartrate  25 mg Oral TID    micafungin (MYCAMINE) IVPB  100 mg Intravenous Q24H    morphine  15 mg Oral Q12H    mycophenolate  250 mg Oral BID    predniSONE  20 mg Oral Daily    QUEtiapine  50 mg Oral QHS    sodium chloride 7%  4 mL Nebulization Q12H    tacrolimus  0.5 mg Oral BID    valGANciclovir  450 mg Oral BID    venlafaxine  150 mg Oral QHS    venlafaxine  37.5 mg Oral QHS    voriconazole  200 mg Oral BID     PRN Meds:acetaminophen, bisacodyl, Dextrose 10% Bolus, Dextrose 10% Bolus, diphenhydrAMINE, glucagon (human recombinant), glucose, glucose, insulin aspart U-100, insulin aspart U-100, lactulose, levalbuterol, lipase-protease-amylase 24,000-76,000-120,000 units, loperamide, LORazepam, magnesium sulfate IVPB, metoclopramide HCl, metoprolol, naloxone, ondansetron, ondansetron, oxyCODONE, potassium chloride in water **AND** potassium chloride in water **AND** potassium chloride in water    Review of patient's allergies indicates:   Allergen Reactions    Albuterol Palpitations    Colistin Anaphylaxis    Vancomycin analogues      Infusion reaction that does not resolve with slowing  Pt. States she can tolerate it when given with 50 mg Benadryl and ran over 3 hours    Neupogen [filgrastim] Other (See Comments)     Ostealgia after five daily doses of 300 mcg.      Bactrim [sulfamethoxazole-trimethoprim] Hives    Ceftazidime Hives     Pt stated can tolerate cefapine not ceftazidime     Ceftazidime     Dronabinol Other (See Comments)     Mental changes/hallucinations    Haldol [haloperidol lactate] Other (See Comments)     Seizure like activity    Nsaids (non-steroidal anti-inflammatory drug)      Cannot have due to lung transplant    Adhesive Rash     Cloth tape- please use tegaderm or paper tape    Aztreonam Rash    Ciprofloxacin Nausea And Vomiting     Projectile N/V, per patient.  Unwilling to retry therapy.       Review of Systems   Constitutional: Negative for chills, diaphoresis and fever.   HENT: Negative for congestion, postnasal drip, rhinorrhea, sinus pressure and sinus pain.    Respiratory: Positive for cough and shortness of breath (with exertion). Negative for wheezing.    Cardiovascular: Negative for chest pain, palpitations and leg swelling.   Gastrointestinal: Negative for abdominal distention, abdominal pain, constipation, diarrhea, nausea and vomiting.   Genitourinary: Negative for decreased urine volume, difficulty urinating, dysuria and urgency.   Musculoskeletal: Positive for back pain (chronic). Negative for neck pain and neck stiffness.   Skin: Positive for pallor. Negative for rash and wound.   Allergic/Immunologic: Positive for immunocompromised state.   Neurological: Positive for weakness (due to deconditioning). Negative for dizziness, seizures, syncope and headaches.   Psychiatric/Behavioral: Negative for agitation, confusion and decreased concentration. The patient is nervous/anxious (chronic).      Objective:   Physical Exam   Constitutional: She is oriented to person, place, and time. She appears well-developed and well-nourished. She is easily aroused. No distress.   HENT:   Head: Normocephalic and atraumatic.   Nose: Nose normal.   Eyes: Conjunctivae and EOM are normal. No scleral icterus.   Neck: Normal range of motion. Neck supple. No JVD present.   Cardiovascular: Normal rate, regular rhythm and normal heart sounds. Exam reveals no friction rub.   No  murmur heard.  Pulmonary/Chest: Effort normal. No accessory muscle usage. No respiratory distress. She has no wheezes. She has no rhonchi. She has no rales.   Abdominal: Soft. Bowel sounds are normal. She exhibits no distension. There is no tenderness.   Musculoskeletal: Normal range of motion. She exhibits no edema.   Neurological: She is alert, oriented to person, place, and time and easily aroused.   Skin: Skin is warm and dry. She is not diaphoretic. No erythema.   Psychiatric: Judgment normal. Her mood appears not anxious. Her speech is not delayed. She is not slowed. She exhibits normal recent memory.   Nursing note and vitals reviewed.        Vital Signs (Most Recent):  Temp: 98.7 °F (37.1 °C) (07/16/19 0856)  Pulse: 88 (07/16/19 0856)  Resp: 13 (07/16/19 0856)  BP: 137/79 (07/16/19 0856)  SpO2: (!) 91 % (07/16/19 0856) Vital Signs (24h Range):  Temp:  [98.3 °F (36.8 °C)-98.9 °F (37.2 °C)] 98.7 °F (37.1 °C)  Pulse:  [] 88  Resp:  [13-19] 13  SpO2:  [91 %-96 %] 91 %  BP: (115-137)/(74-86) 137/79     Weight: 56.3 kg (124 lb 1.9 oz)  Body mass index is 23.45 kg/m².      Intake/Output Summary (Last 24 hours) at 7/16/2019 1028  Last data filed at 7/16/2019 0618  Gross per 24 hour   Intake 800 ml   Output 2 ml   Net 798 ml       Significant Labs:  CBC:  Recent Labs   Lab 07/16/19  0600   WBC 3.71*   RBC 3.06*   HGB 8.7*   HCT 29.6*      MCV 97   MCH 28.4   MCHC 29.4*     BMP:  Recent Labs   Lab 07/16/19  0600      K 4.2      CO2 24   BUN 14   CREATININE 0.9   CALCIUM 8.7      Tacrolimus Levels:  Recent Labs   Lab 07/15/19  0600   TACROLIMUS 7.2     Microbiology:  Microbiology Results (last 7 days)     Procedure Component Value Units Date/Time    Fungus culture [[id number]]  (Abnormal) Collected:  06/18/19 8880    Order Status:  Completed Specimen:  Tissue from Lung, Left Updated:  07/15/19 1339     Fungus (Mycology) Culture CANDIDA ALBICANS  Moderate      Narrative:       Donor lung     Fungus culture [[id number]]  (Abnormal) Collected:  07/05/19 1445    Order Status:  Completed Specimen:  Respiratory from Bronchial Wash Updated:  07/11/19 0852     Fungus (Mycology) Culture BRANDI GLABRATA  Many      Narrative:       Bronchial Wash    Fungus culture [[id number]] Collected:  06/25/19 0900    Order Status:  Completed Specimen:  Respiratory from Bronchial Wash Updated:  07/10/19 0918     Fungus (Mycology) Culture Culture in progress      No fungus isolated after 2 weeks    Narrative:       Bronchial Wash          I have reviewed all pertinent labs within the past 24 hours.

## 2019-07-16 NOTE — PROGRESS NOTES
"[facility]  Endocrinology  Progress Note    Admit Date: 2019     Reason for Consult: Management of  Hyperglycemia and CF related pancreatic insufficiency.     Surgical Procedure and Date: lung transplant in ; 19    HPI:   Patient is a 29 y.o. female with a diagnosis of CF, HTN, migraine headache, and osteopenia. 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 No personal history of DM. Endocrinology consulted for BG management.     Lab Results   Component Value Date    HGBA1C 4.7 2019                 Interval HPI:   Overnight events: Remains in TSU. NAEON. Antibiotics. Prednisone 20 mg daily; steroid per primary. BG reasonably well controlled with scheduled insulin and correction scale.   Eatin-100%   Nausea: No  Hypoglycemia and intervention: No  Fever: No  TPN and/or TF: No      /79 (Patient Position: Lying)   Pulse 88   Temp 98.7 °F (37.1 °C) (Oral)   Resp 13   Ht 5' 1" (1.549 m)   Wt 56.3 kg (124 lb 1.9 oz)   LMP 10/02/2016   SpO2 (!) 91%   Breastfeeding? No   BMI 23.45 kg/m²      Labs Reviewed and Include    Recent Labs   Lab 19  0600   *   CALCIUM 8.7   ALBUMIN 2.7*   PROT 5.8*      K 4.2   CO2 24      BUN 14   CREATININE 0.9   ALKPHOS 135   ALT 17   AST 14   BILITOT 0.3     Lab Results   Component Value Date    WBC 3.71 (L) 2019    HGB 8.7 (L) 2019    HCT 29.6 (L) 2019    MCV 97 2019     2019     No results for input(s): TSH, FREET4 in the last 168 hours.  Lab Results   Component Value Date    HGBA1C 4.7 2019       Nutritional status:   Body mass index is 23.45 kg/m².  Lab Results   Component Value Date    ALBUMIN 2.7 (L) 2019    ALBUMIN 2.5 (L) 07/15/2019    ALBUMIN 2.5 (L) 2019     Lab Results   Component Value Date    PREALBUMIN 15 (L) 2014    PREALBUMIN 11 (L) 2014    PREALBUMIN 18 (L) 2014 "       Estimated Creatinine Clearance: 69 mL/min (based on SCr of 0.9 mg/dL).    Accu-Checks  Recent Labs     07/13/19  1140 07/13/19  1932 07/14/19  0931 07/14/19  1123 07/14/19  1332 07/14/19  2039 07/15/19  0858 07/15/19  1152 07/15/19  2059   POCTGLUCOSE 175* 186* 117* 161* 192* 231* 109 140* 246*       Current Medications and/or Treatments Impacting Glycemic Control  Immunotherapy:    Immunosuppressants         Stop Route Frequency     mycophenolate capsule 250 mg      -- Oral 2 times daily     tacrolimus capsule 0.5 mg      -- Oral 2 times daily        Steroids:   Hormones (From admission, onward)    Start     Stop Route Frequency Ordered    07/02/19 0900  predniSONE tablet 20 mg      -- Oral Daily 07/01/19 1148    06/26/19 1827  dexAMETHasone sodium phos (PF) 10 mg/mL injection     Note to Pharmacy:  Created by cabinet override    06/27 0629   06/26/19 1827        Pressors:    Autonomic Drugs (From admission, onward)    None        Hyperglycemia/Diabetes Medications:   Antihyperglycemics (From admission, onward)    Start     Stop Route Frequency Ordered    07/14/19 1130  insulin aspart U-100 pen 2-5 Units      -- SubQ 3 times daily with meals 07/14/19 0939    07/06/19 1135  insulin aspart U-100 pen 3 Units      -- SubQ As needed (PRN) 07/06/19 1036    07/02/19 1543  insulin aspart U-100 pen 1-10 Units      -- SubQ Before meals & nightly PRN 07/02/19 1443          ASSESSMENT and PLAN    * S/P lung transplant  Managed per LUT.   avoid hypoglycemia        Hyperglycemia  BG goal 140 - 180    increase Novolog 3-5 units with meals - monitor for prandial excursions once appetite improves  Continue Novolog 3 units snack dose for evenings.   Moderate dose correction scale  BG monitoring AC/HS    Discharge planning: DPP4i plus correction scale if covered by insurance. Otherwise novolog 4 units with meals plus correction scale.     Adrenal cortical steroids causing adverse effect in therapeutic use  Glucocorticoids  markedly increase blood glucose. Expect the steroid taper will help glucose control.         Prophylactic immunotherapy  May increase insulin resistance.       CF related Pancreatic insufficiency  May impact BG.           Amy Zapata NP  Endocrinology  [facility]

## 2019-07-16 NOTE — PLAN OF CARE
Pt arrived to  via stretcher for left tunneled PICC removal.. Name verified using two identifiers. No acute distress noted. Orders, allergies, consent and labs reviewed.

## 2019-07-16 NOTE — ASSESSMENT & PLAN NOTE
BG goal 140 - 180    increase Novolog 3-5 units with meals - monitor for prandial excursions once appetite improves  Continue Novolog 3 units snack dose for evenings.   Moderate dose correction scale  BG monitoring AC/HS    Discharge planning: DPP4i plus correction scale if covered by insurance. Otherwise novolog 4 units with meals plus correction scale.

## 2019-07-16 NOTE — PROGRESS NOTES
Post discharge education material was provided to the patient and her significant other on 7/26/19; patient and spouse were instructed to read the material thoroughly and be ready to discuss it in detail shortly before discharge.    Met with patient and spouse in him hospital room today; spent 1.25 hours conducting comprehensive post transplant discharge education.    Topics covered included:   Contact phone numbers for the Lung Transplant team both during and after office hours and for urgent/emergent needs  Review of team members and their role now that the patient has been transplanted   Schedule for clinic visits, lab work, pulmonary function studies, CXRs, surveillance bronchoscopies including location and special consideration for the test such as holding Prograf until after blood has been drawn for labs, etc.   Obtaining and documenting VS daily; reviewed the findings which are reportable even between clinic visits: T > 100.5, or low to no T elevation if feeling poorly; wt. gain of > 2 lbs daily for 2 or more consecutive days, or wt. gain of >3# in 24 hours;  BP >140/90 or <90/60  Bringing medicine list and discharge binder to each clinic visit  Wearing special mask at all times (provided to patient in hospital) when leaving home/local apt. for at least the first 3 months post transplant   Complications post lung transplant - most common are: infection & rejection w/ review of the corresponding s/s   Overview of how the immunosuppressant medications prevent rejection   Importance of taking meds as rxd, never self adjusting doses, following only the  directions on the medication list (provided at discharge) and never using the medicine bottle to verify current medication directions; use medication label only to identify the drug and strength per pill/capsule (Lung Transplant Clinical Pharmacist will review each medication in detail and provide the first months supply at discharge); patient is expected to  learn the names, doses, action, side effects and dose schedule - be independent with taking medications. The care giver is for support/backup and should never know the medications better than the patient   What to do if a dose of medication is missed or if the patient vomits after taking medication   That refills are the patients responsibility and should be requested when the patient has 5-7 DAYS worth of the medication left. NEVER let a medication run out   Short list of allowable OTC meds; never take NSAIDS or consume any food/beverage containing grapefruit.  Avoid ETOH intake  Infection prevention and the importance of good and frequent hand washing for patient and members of the household   Health maintenance appts such as biannual dental exams, annual GYN visits w/ pap smear and mammogram (females), annual prostate exam (males), annual or biannual Dermatology visits, plus visits whenever needed to evaluate new findings. It is important to do regular self examination of skin which includes examination by an appropriate family member of areas not easily visualized   Wear sun block whenever leaving the home since skin cancer is one of the most prevalent types of cancer in the immunosuppressed population   Develop a plan for regular exercise to control weight and improve mental health  Observe strict sternal precautions for a minimum of 6-8 weeks post transplant which includes: no lifting objects heavier than 5#; do not push up using arms when changing position; do not engage in any activity which could result in trauma to the sternum; sternal wound care and observation of incision for s/s of infection &/or separation of wound edges w/ or w/o drainage;    Send thank you note to the donor family through [facility] - must not include information that will make it possible to be identified; informed that if both parties agree to make contact at some future time after transplant, there is a process which involves  [facility].  Stressed the importance of quickly regaining physical strength and endurance which is mandatory for the prevention of complications such as blood clots, pneumonia and progressive loss of muscle mass which can lead to further debilitation. These complications are most often the result of delayed ambulation, either from poor effort or unavoidable medical issues.  Either way, a favorable outcome will be jeopardized.              Patient and significant other were interactive during the education and asked pertinent questions, verbalized their understanding of material discussed and readiness for discharge. Patient is scheduled to be seen in the out patient clinic on 7/19/19 & were given written appt. schedule.

## 2019-07-16 NOTE — SUBJECTIVE & OBJECTIVE
"Interval HPI:   Overnight events: Remains in TSU. NAEON. Antibiotics. Prednisone 20 mg daily; steroid per primary. BG reasonably well controlled with scheduled insulin and correction scale.   Eatin-100%   Nausea: No  Hypoglycemia and intervention: No  Fever: No  TPN and/or TF: No      /79 (Patient Position: Lying)   Pulse 88   Temp 98.7 °F (37.1 °C) (Oral)   Resp 13   Ht 5' 1" (1.549 m)   Wt 56.3 kg (124 lb 1.9 oz)   LMP 10/02/2016   SpO2 (!) 91%   Breastfeeding? No   BMI 23.45 kg/m²     Labs Reviewed and Include    Recent Labs   Lab 19  0600   *   CALCIUM 8.7   ALBUMIN 2.7*   PROT 5.8*      K 4.2   CO2 24      BUN 14   CREATININE 0.9   ALKPHOS 135   ALT 17   AST 14   BILITOT 0.3     Lab Results   Component Value Date    WBC 3.71 (L) 2019    HGB 8.7 (L) 2019    HCT 29.6 (L) 2019    MCV 97 2019     2019     No results for input(s): TSH, FREET4 in the last 168 hours.  Lab Results   Component Value Date    HGBA1C 4.7 2019       Nutritional status:   Body mass index is 23.45 kg/m².  Lab Results   Component Value Date    ALBUMIN 2.7 (L) 2019    ALBUMIN 2.5 (L) 07/15/2019    ALBUMIN 2.5 (L) 2019     Lab Results   Component Value Date    PREALBUMIN 15 (L) 2014    PREALBUMIN 11 (L) 2014    PREALBUMIN 18 (L) 2014       Estimated Creatinine Clearance: 69 mL/min (based on SCr of 0.9 mg/dL).    Accu-Checks  Recent Labs     19  1140 19  1932 19  0931 19  1123 19  1332 19  2039 07/15/19  0858 07/15/19  1152 07/15/19  2059   POCTGLUCOSE 175* 186* 117* 161* 192* 231* 109 140* 246*       Current Medications and/or Treatments Impacting Glycemic Control  Immunotherapy:    Immunosuppressants         Stop Route Frequency     mycophenolate capsule 250 mg      -- Oral 2 times daily     tacrolimus capsule 0.5 mg      -- Oral 2 times daily        Steroids:   Hormones (From admission, " onward)    Start     Stop Route Frequency Ordered    07/02/19 0900  predniSONE tablet 20 mg      -- Oral Daily 07/01/19 1148    06/26/19 1827  dexAMETHasone sodium phos (PF) 10 mg/mL injection     Note to Pharmacy:  Created by cabinet override    06/27 0629 06/26/19 1827        Pressors:    Autonomic Drugs (From admission, onward)    None        Hyperglycemia/Diabetes Medications:   Antihyperglycemics (From admission, onward)    Start     Stop Route Frequency Ordered    07/14/19 1130  insulin aspart U-100 pen 2-5 Units      -- SubQ 3 times daily with meals 07/14/19 0939    07/06/19 1135  insulin aspart U-100 pen 3 Units      -- SubQ As needed (PRN) 07/06/19 1036    07/02/19 1543  insulin aspart U-100 pen 1-10 Units      -- SubQ Before meals & nightly PRN 07/02/19 1443

## 2019-07-16 NOTE — ASSESSMENT & PLAN NOTE
POD#28 s/p bilateral lung transplant (re-transplant) for CARLOS EDUARDO. 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 PGD 0. S/p bronch and extubated to HFNC on POD#2, but was re-intubated 6/21 for hypercapnic respiratory failure. Bronchoscopy 7/5 with  pseudomonas.   Continue PT/OT/CPT/nebs/IS. Continue immunosuppression and prophylaxis. Will plan for discharge tomorrow.

## 2019-07-17 NOTE — PROGRESS NOTES
"[facility]  Endocrinology  Progress Note    Admit Date: 2019     Reason for Consult: Management of  Hyperglycemia and CF related pancreatic insufficiency.     Surgical Procedure and Date: lung transplant in ; 19    HPI:   Patient is a 29 y.o. female with a diagnosis of CF, HTN, migraine headache, and osteopenia. 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 No personal history of DM. Endocrinology consulted for BG management.     Lab Results   Component Value Date    HGBA1C 4.7 2019                 Interval HPI:   Overnight events: Remains in TSU. NAEON. Tentative discharge today. BG reasonably well controlled with scheduled insulin and correction scale coverage. Prednisone 20 mg daily; steroids per primary.   Eatin-100% - outside food   Nausea: No  Hypoglycemia and intervention: Yes 60 glucose tabs   Fever: No  TPN and/or TF: No      /88   Pulse 83   Temp 98.4 °F (36.9 °C) (Oral)   Resp 14   Ht 5' 1" (1.549 m)   Wt 56.3 kg (124 lb 1.9 oz)   LMP 10/02/2016   SpO2 97%   Breastfeeding? No   BMI 23.45 kg/m²      Labs Reviewed and Include    Recent Labs   Lab 19  0500   GLU 97   CALCIUM 8.4*   ALBUMIN 2.4*   PROT 5.5*      K 4.3   CO2 25      BUN 12   CREATININE 0.8   ALKPHOS 326*   ALT 48*   AST 48*   BILITOT 0.3     Lab Results   Component Value Date    WBC 3.78 (L) 2019    HGB 8.5 (L) 2019    HCT 28.5 (L) 2019    MCV 97 2019     2019     No results for input(s): TSH, FREET4 in the last 168 hours.  Lab Results   Component Value Date    HGBA1C 4.7 2019       Nutritional status:   Body mass index is 23.45 kg/m².  Lab Results   Component Value Date    ALBUMIN 2.4 (L) 2019    ALBUMIN 2.7 (L) 2019    ALBUMIN 2.5 (L) 07/15/2019     Lab Results   Component Value Date    PREALBUMIN 15 (L) 2014    PREALBUMIN 11 (L) 2014    PREALBUMIN " 18 (L) 03/19/2014       Estimated Creatinine Clearance: 77.6 mL/min (based on SCr of 0.8 mg/dL).    Accu-Checks  Recent Labs     07/14/19  1332 07/14/19  2039 07/15/19  0858 07/15/19  1152 07/15/19  2059 07/16/19  0908 07/16/19  1237 07/16/19  1757 07/16/19  2239 07/16/19  2325   POCTGLUCOSE 192* 231* 109 140* 246* 139* 171* 368* 60* 126*       Current Medications and/or Treatments Impacting Glycemic Control  Immunotherapy:    Immunosuppressants         Stop Route Frequency     mycophenolate capsule 250 mg      -- Oral 2 times daily     tacrolimus capsule 0.5 mg      -- Oral 2 times daily        Steroids:   Hormones (From admission, onward)    Start     Stop Route Frequency Ordered    07/02/19 0900  predniSONE tablet 20 mg      -- Oral Daily 07/01/19 1148    06/26/19 1827  dexAMETHasone sodium phos (PF) 10 mg/mL injection     Note to Pharmacy:  Created by cabinet override    06/27 0629   06/26/19 1827        Pressors:    Autonomic Drugs (From admission, onward)    None        Hyperglycemia/Diabetes Medications:   Antihyperglycemics (From admission, onward)    Start     Stop Route Frequency Ordered    07/17/19 0930  SITagliptin tablet 100 mg      -- Oral Daily 07/17/19 0818    07/17/19 0916  insulin aspart U-100 pen 0-5 Units      -- SubQ Before meals & nightly PRN 07/17/19 0817          ASSESSMENT and PLAN    * S/P lung transplant  Managed per LUT.   avoid hypoglycemia        Hyperglycemia  BG goal 140 - 180    Discontinue scheduled insulin.   Change to low dose correction scale.   BG monitoring AC/HS  Start januvia 100 mg daily; No history of pancreatitis or medullary thyroid CA.       Discharge planning: januvia 100 mg daily. BG logs. Follow up in 1-2 weeks in discharge clinic.     Adrenal cortical steroids causing adverse effect in therapeutic use  Glucocorticoids markedly increase blood glucose. Expect the steroid taper will help glucose control.         Prophylactic immunotherapy  May increase insulin  resistance.       CF related Pancreatic insufficiency  May impact BG.           Amy Zapata NP  Endocrinology  [facility]

## 2019-07-17 NOTE — PLAN OF CARE
Problem: Adult Inpatient Plan of Care  Goal: Plan of Care Review  Outcome: Ongoing (interventions implemented as appropriate)  Pt AAOx4, VSS. Pt reports not being hungry enough to eat meals, meal time insulin held. Pt stated she was not going to eat dinner either but dinner BG was in 300s, only correction dose was administered. Mag was 1.3 this AM, 4g replaced IVPB. Left IJ CVL removed. Benadryl IVP increased to TID. Pt c/o chest pain, team aware. Contact precautions maintained. Pt nor spouse pulling self meds & pt still not measuring urine. No diarrhea reported today, PRN Imodium administered x1. Spouse at bedside, bed in low/locked position, call light/personal belongings within reach, WCTM.

## 2019-07-17 NOTE — NURSING
Pt discharged via wheelchair by transport. Pt left with personal belongings & discharge instructions. Discharge instructions were reviewed with pt & spouse, pt verbalized understanding to all.  Pt denies pain or other need at time of discharge.

## 2019-07-17 NOTE — PLAN OF CARE
Problem: Physical Therapy Goal  Goal: Physical Therapy Goal  Goals to be met by: 19    Patient will increase functional independence with mobility by performin. Supine <> sit with stand-by assistance. -Met  2. Sit to stand transfer from bedside chair with contact guard assistance -met   Revised: Sit to stand transfer with Supervision -- MET   3. Pt to perform stand pivot transfer with Tonio, with use of RW. -met  4. Gait  x 50 feet with Tonio, with use of RW.-met   Gait x 300' with Supervision with use of RW without significant LOB   5.  Pt to perform and be compliant with exercise program in sitting to improve BLE strength and endurance    Outcome: Ongoing (interventions implemented as appropriate)  Goals updated; continue current POC.     Kari Suarez PT, DPT   2019  Pager: [phone]

## 2019-07-17 NOTE — PROGRESS NOTES
Discharge Note:    SW met with pt to assess needs for discharge. Pt scheduled to discharge to home with IV infusions/ out pt PT/DME.     Orders for pt's infusions sent to Vince with [employer] [phone]/ fx [phone] (Pt previously established with infusion company due to insurance being within network). SW confirmed with [employer] today that medications were being delivered to pt's home in afternoon.    Pt also requested a RW. Orders placed yesterday to [facility] (ph# [id number]- All DME orders automatically sent through [facility] clearing house and are outsourced to alternate DME companies if needed due to location or insurance). SW followed up with Cierra who states pt recently had a WC ordered and therefor was not covered for a walker. Out of pocket costs quoted was $70. Today pt and spouse stated would like to pay out of pocket for walker and contacted [facility] and had it delivered.     Pt also had orders placed for out pt PT. Pt reports no preference and was agreeable to utilize [facility] by her home. Pt states was already contacted by facility and was instructed to contact them upon discharge.     LOKI reviewed discharge plan with pt. Pt aware of, and involved in discharge plan. Pt to discharge home with the assistance of s/o Shawn who was present. Pt reports coping adequately with discharge at this time and was sitting up on bed alert and oriented x 4 with pleasant affect.  Pt verbalized no additional needs or concerns at this time.  Contact information provided. SW to remain available.

## 2019-07-17 NOTE — ANESTHESIA POST-OP PAIN MANAGEMENT
Acute Pain Service Progress Note      Surgery:  Procedure(s) (LRB):  REDO BILATERAL LUNG TRANSPLANT; ASHLEY (Bilateral)    Juanita Ibarra is a 29 y.o. female.    Interval Hx:     Post Op Day #: 3    Catheter type: Bilateral perineural TARA's    Infusion type: Hand boluses Q12H at 0700 and 1900    Problem List:    Active Hospital Problems    Diagnosis  POA    *S/P lung transplant [Z94.2]  Not Applicable    Impaired functional mobility and endurance [Z74.09]  Unknown    Aspergillus [B44.9]  No    Candida glabrata infection [B37.9]  No    Lung transplant status, bilateral [Z94.2]  Not Applicable    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 [A49.8, Z16.19]  No    Prophylactic immunotherapy [Z29.8]  Not Applicable    Acute blood loss anemia [D62]  No    Prophylactic antibiotic [Z79.2]  Not Applicable    Immunosuppression [D89.9]  Yes    Hyperglycemia [R73.9]  Yes    Adrenal cortical steroids causing adverse effect in therapeutic use [T38.0X5A]  Yes    CF related Pancreatic insufficiency [K86.89]  Yes    Chronic pain with opiate use [G89.29]  Yes    Anxiety disorder [F41.9]  Yes      Resolved Hospital Problems    Diagnosis Date Resolved POA    On enteral nutrition [Z78.9] 07/10/2019 No    Acute hyperactive delirium due to another medical condition [F05] 07/01/2019 No    Awaiting organ transplant [Z76.82] 06/22/2019 Not Applicable    Awaiting organ transplant [Z76.82] 06/18/2019 Not Applicable    Thrombocytopenia, unspecified [D69.6] 07/01/2019 No    Steroid-induced hyperglycemia [R73.9, T38.0X5A] 07/11/2019 Yes    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 Pruritis: No   2. Nausea:  No   3. Motor Blockade: No, 0=Ability to raise lower extremities off bed   4. Sedation: No, 1=awake and alert    Schedule Medications:    ceftolozane-tazobactam  1,500 mg Intravenous Q8H    cetirizine  10 mg Oral Daily    dapsone  100 mg Oral Daily    enoxaparin  40 mg Subcutaneous Daily    gabapentin  300 mg Oral TID    hydrocortisone   Topical (Top) BID    levalbuterol  1.25 mg Nebulization Q6H WAKE    lipase-protease-amylase 24,000-76,000-120,000 units  6 capsule Oral TID WM    metoprolol tartrate  25 mg Oral TID    micafungin (MYCAMINE) IVPB  100 mg Intravenous Q24H    morphine  15 mg Oral Q12H    mycophenolate  250 mg Oral BID    predniSONE  20 mg Oral Daily    QUEtiapine  50 mg Oral QHS    SITagliptin  100 mg Oral Daily    sodium chloride 7%  4 mL Nebulization Q12H    tacrolimus  1 mg Oral BID    valGANciclovir  450 mg Oral BID    venlafaxine  150 mg Oral QHS    venlafaxine  37.5 mg Oral QHS    voriconazole  200 mg Oral BID        Continuous Infusions:       PRN Medications:  acetaminophen, bisacodyl, Dextrose 10% Bolus, Dextrose 10% Bolus, diphenhydrAMINE, glucagon (human recombinant), glucose, glucose, heparin, porcine (PF), insulin aspart U-100, lactulose, levalbuterol, lipase-protease-amylase 24,000-76,000-120,000 units, loperamide, LORazepam, magnesium sulfate IVPB, metoclopramide HCl, metoprolol, naloxone, ondansetron, ondansetron, oxyCODONE, potassium chloride in water **AND** potassium chloride in water **AND** potassium chloride in water       Antibiotics:  Antibiotics (From admission, onward)    Start     Stop Route Frequency Ordered    07/09/19 1330  ceftolozane-tazobactam (ZERBAXA) 1,500 mg in dextrose 5 % 100 mL      -- IV Every 8 hours (non-standard times) 07/09/19 1219    07/05/19 1800  dapsone tablet 100 mg      -- Oral Daily 07/05/19 1305           Objective:  Catheter site: clean, dry, intact    Vital Signs (Most Recent):  Temp: 37 °C (98.6 °F) (07/17/19 1554)  Pulse: 89  (07/17/19 1554)  Resp: 18 (07/17/19 1554)  BP: 129/79 (07/17/19 1554)  SpO2: (!) 93 % (07/17/19 1554) Vital Signs Range (Last 24H):  Temp:  [36.8 °C (98.3 °F)-37.2 °C (99 °F)]   Pulse:  []   Resp:  [14-18]   BP: (110-146)/(78-92)   SpO2:  [92 %-99 %]      I & O (Last 24H):    Intake/Output Summary (Last 24 hours) at 7/17/2019 1831  Last data filed at 7/16/2019 2315  Gross per 24 hour   Intake 180 ml   Output --   Net 180 ml       Physical Exam:  GA: Alert, comfortable, no acute distress.   Pulmonary: Clear to auscultation A/P/L. No wheezing, crackles, or rhonchi.  Cardiac: RRR S1 & S2 w/o rubs/murmurs/gallops.   Abdominal:Bowel sounds present. No tenderness to palpation or distension. No appreciable hepatosplenomegaly.   Skin: No jaundice, rashes, or visible lesions.    Laboratory:  CBC:   Recent Labs     07/16/19  0600 07/17/19  0500   WBC 3.71* 3.78*   RBC 3.06* 2.94*   HGB 8.7* 8.5*   HCT 29.6* 28.5*    172   MCV 97 97   MCH 28.4 28.9   MCHC 29.4* 29.8*       BMP:   Recent Labs     07/16/19  0600 07/17/19  0500    140   K 4.2 4.3   CO2 24 25    109   BUN 14 12   CREATININE 0.9 0.8   * 97   MG 1.3* 1.9   CALCIUM 8.7 8.4*       No results for input(s): PT, INR, PROTIME, APTT in the last 72 hours.    Anticoagulant: None.    Assessment:    Pain control: inadequate    Plan:  Patient now s/p bilateral lung transplant on 06/18/19; Hx of significant opioid use post previous lung transplant approximately five years ago.   - Patient with significant opioid requirement prior to this admission/surgery from previous lung transplant  - Remained intubated s/p lung transplant, requiring fentanyl gtt at 250 mcg/hr  - Also requiring IVPs and Precedex w/ Propofol for sedation  - S/P bilateral TARA catheter placement 06/19/19 w/ hand boluses Q12H at 0700 and 1900  - Patient placed on ketamine infusion at 10 mcg/kg/min, fentanyl weaned  - Patient now extubated and off fentanyl gtt, however, remains in  significant pain and observably anxious  - Plan to keep ketamine at 15 mcg/kg/min and begin Dilaudid PCA for pain control  - Patient remains NPO at this time, 1000 mg IV Tylenol Q8H for 24H awaiting SLP eval  - Will initiate multimodal regimen once patient passes swallow evaluation w/ SLP      Zbigniew Pennington MD  Pager: [phone]

## 2019-07-17 NOTE — PT/OT/SLP PROGRESS
"Physical Therapy Treatment    Patient Name:  Juanita Ibarra   MRN:  [id number]    Recommendations:     Discharge Recommendations: home with outpatient PT   Discharge Equipment Recommendations: Rolling walker, bedside commode   Barriers to discharge: None    Assessment:     Juanita Ibarra is a 29 y.o. female s/p lung transplant. Pt has progressed well during therapy course. However, she continues to display self-limiting behaviors and requires increased encouragement to increase OOB activity.  She presents with the following impairments/functional limitations:  impaired endurance, weakness, impaired functional mobilty, impaired balance, gait instability, pain, impaired cardiopulmonary response to activity. Pt would benefit from continued PT intervention to address listed functional deficits, reduce fall risk and maximize return to PLOF.     Rehab Prognosis: Good; patient would benefit from acute skilled PT services to address these deficits and reach maximum level of function.      Recent Surgery: Procedure(s) (LRB):  Bronchoscopy (N/A) 12 Days Post-Op    Plan:     During this hospitalization, patient to be seen 3 x/week to address the identified rehab impairments via therapeutic activities, gait training, therapeutic exercises, neuromuscular re-education and progress toward the following goals:    · Plan of Care Expires:  07/26/19    Subjective     Chief Complaint: pain   Patient/Family Comments/goals: "I'll only stay in the room, I'm not walking in the halls" "I'll be fine at home, my  will be there all the time"    Pain/Comfort:  · Pain Rating 1: 6/10  · Location - Orientation 1: generalized  · Pain Addressed 1: Reposition, Distraction  · Pain Rating Post-Intervention 1: (remained throughout)      Objective:     Communicated with RN prior to session.  Patient found supine with telemetry upon PT entry to room.     General Precautions: Standard, fall, aspiration   Orthopedic Precautions:N/A " "  Braces: N/A     Functional Mobility:    Bed Mobility:   · Supine > Sit: supervision   · Sit > Supine: supervision   · *HOB elevated    Transfers:   · Sit <> Stand Transfer: supervision from EOB with no AD     Gait:  · Distance: ~40 ft.   · Assistance level: stand by assistance  · Assistive Device: RW  · Gait Assessment: pt with decreased step length, decreased gait speed, generalized instability. One episode of LOB without fall with pt beginning trial prior to obtaining RW, requiring cueing for safety.   · *Pt stated: "You make me nervous when you hover"; therapist explained reasoning for SBA-CGA due to pt's impaired balance and fall risk        AM-PAC 6 CLICK MOBILITY  Turning over in bed (including adjusting bedclothes, sheets and blankets)?: 4  Sitting down on and standing up from a chair with arms (e.g., wheelchair, bedside commode, etc.): 4  Moving from lying on back to sitting on the side of the bed?: 4  Moving to and from a bed to a chair (including a wheelchair)?: 3  Need to walk in hospital room?: 3  Climbing 3-5 steps with a railing?: 3  Basic Mobility Total Score: 21       Therapeutic Activities and Exercises:  Reinforced education re:   - PT POC and continued goals for therapy   - importance of increasing OOB activity to improve endurance and strength   - seated therex for BLE strengthening    Pt participated in functional mobility tasks within room as described above. Pt refusing mobility out of room.     Patient left HOB elevated with all lines intact, call button in reach, RN notified and spouse present.    GOALS:   Multidisciplinary Problems     Physical Therapy Goals        Problem: Physical Therapy Goal    Goal Priority Disciplines Outcome Goal Variances Interventions   Physical Therapy Goal     PT, PT/OT Ongoing (interventions implemented as appropriate)     Description:  Goals to be met by: 19    Patient will increase functional independence with mobility by performin. Supine <> sit " with stand-by assistance. -Met  2. Sit to stand transfer from bedside chair with contact guard assistance -met   Revised: Sit to stand transfer with Supervision -- MET 7/17  3. Pt to perform stand pivot transfer with Tonio, with use of RW. -met  4. Gait  x 50 feet with Tonio, with use of RW.-met   Gait x 300' with Supervision with use of RW without significant LOB   5.  Pt to perform and be compliant with exercise program in sitting to improve BLE strength and endurance                      Time Tracking:     PT Received On: 07/17/19  PT Start Time: 1310     PT Stop Time: 1320  PT Total Time (min): 10 min     Billable Minutes: Therapeutic Activity 10 min    Treatment Type: Treatment  PT/PTA: PT     PTA Visit Number: 0     Kari Suarez PT, DPT   7/17/2019  Pager: [phone]

## 2019-07-17 NOTE — DISCHARGE SUMMARY
[facility]  Lung Transplant  Discharge Summary      Patient Name: Juanita Ibarra  MRN: [id number]  Admission Date: 6/17/2019  Hospital Length of Stay: 29 days  Discharge Date and Time: 07/17/2019 3:54 PM  Attending Physician: Francisca Morin DO   Discharging Provider: Shama Canales PA-C  Primary Care Provider: Primary Doctor Beth     HPI: Juanita Ibarra is on 2L of oxygen.  She is on no assisted ventilation.  Her [place] Heart Association Class is 50% - Requires considerable assistance and frequent medical care.   She has been active on the lung transplant list since 5/15/2019 with an LAS of 36.8.  She is 5 years out from her first bilateral lung transplant for a diagnosis of CF and is now listed for re-transplant given her progressive end stage CARLOS EDUARDO.  She is not diabetic.  She was called in for a donor offer this afternoon for which she accepted.  Donor is not PHS increased risk.    She states that her dyspnea has continued to worsen over the last couple months.  A few weeks ago, she complained of worsening productive cough and just completed a 3 week course of Vanc/Merrem.  She states she still has an occasional productive cough of thick white sputum but denies any fever, chills, nightsweats, or sick contacts.      Procedure(s) (LRB):  Bronchoscopy (N/A)     Hospital Course: Patient underwent uncomplicated BLT (retransplant) on 6/18/19. Extubated to HFNC on POD#2, but was re-intubated 6/21 for hypercapnic respiratory failure 9 (2/2 sedating medications). Bronchoscopy 7/5 with  pseudomonas and placed on Zerbaxa. +Aspergillus antigen and started on voriconazole while inpatient. Plan for discharge home today with plan to follow up in outpatient clinic on 7/19. Continue IV Zerbaxa every 8 hours through 7/24/19.     * S/P lung transplant  POD#29 s/p bilateral lung transplant (re-transplant) for CARLOS EDUARDO. 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 Continue outpatient PT/OT. Continue  immunosuppression and prophylaxis.      Immunosuppression  Previously on tacrolimus, MMF, and daily prednisone. S/p solumedrol in OR, basiliximab on POD#0 and POD#4. Continue MMF, steroid taper, and tacrolimus.      Prophylactic antibiotic  CMV D-/R+. Received Vanc/Merrem as routine surgical prophylaxis, which was transitioned to Cipro. Continue OIP with valganciclovir and dapsone. Continue micafungin for Candida glabrata. Continue voriconazole for aspergillus.     CF related Pancreatic insufficiency  Continue Creon with meals and snacks.      Anxiety disorder  Continue home dose of Effexor and Ativan.      Acute blood loss anemia  Received 6u PRBCs, 2u plts, 1 cryo, and 4 FFP angie-operatively. No need for further transfusions.      Chronic pain with opiate use  Transitioned to home regimen of MS Contin 15 mg BID.  Oxy 10 mg Q6hrs PRN. Limit sedating medications.       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 Received Tobramycin x1 on 6/22 and was transitioned from Merrem to Cipro. BAL from 7/5 with  Pseudomonas. Started on Zerbaxa 7/9. ID following, appreciate recs.  Continue home IV infusion of Zerbaxa through 7/24.     Adrenal cortical steroids causing adverse effect in therapeutic use  Endocrinology consulted, appreciate recs.     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 Repeat BAL on 6/23 with Candida glabrata. Continue micafungin per ID recs.     Aspergillus  Positive aspergillus ag from BAL on 7/5. Continue voriconazole.    Consults (From admission, onward)        Status Ordering Provider     Consult to Endocrinology  Once     Provider:  (Not yet assigned)    Completed MAYA MOON     Inpatient consult to Infectious Diseases  Once     Provider:  (Not yet assigned)    Completed SAI VALERA     Inpatient consult to  Infectious Diseases  Once     Provider:  (Not yet assigned)    Completed SARIKA SHERMAN     Inpatient consult to Physical Medicine Rehab  Once     Provider:  (Not yet assigned)    Completed PADDY FRANKS     [facility]  Once     Provider:  (Not yet assigned)    Completed ARTURO LIU     Inpatient consult to Psychiatry  Once     Provider:  (Not yet assigned)    Completed ARTURO LIU     Inpatient consult to Registered Dietitian/Nutritionist  Once     Provider:  (Not yet assigned)    Completed MAYA MOON     Inpatient consult to Registered Dietitian/Nutritionist  Once     Provider:  (Not yet assigned)    Completed DUSTY EDWARD          Significant Diagnostic Studies: Labs: All labs within the past 24 hours have been reviewed  Microbiology:   Blood Culture   Lab Results   Component Value Date    LABBLOO No growth after 5 days. 04/05/2019    and Sputum Culture   Lab Results   Component Value Date    GSRESP Few WBC's 07/05/2019    GSRESP Few Gram positive rods 07/05/2019    GSRESP Few budding yeast 07/05/2019    RESPIRATORYC No S aureus isolated. 07/05/2019    RESPIRATORYC (A) 07/05/2019     PSEUDOMONAS AERUGINOSA   Moderate  Normal respiratory anibal also present       Radiology: X-Ray: CXR: X-Ray Chest 1 View (CXR):   Results for orders placed or performed during the hospital encounter of 06/17/19   X-Ray Chest 1 View    Narrative    EXAMINATION:  XR CHEST 1 VIEW    CLINICAL HISTORY:  s/p R chest tube removal;.    TECHNIQUE:  Single frontal portable view of the chest was performed.    COMPARISON:  June 28, 2019 at 04:58    FINDINGS:  Support devices: Surgical changes including sternotomy wires, left chest tube right internal jugular central venous catheter, and right-sided indwelling central venous catheter remain.  NG tube has its tip overlying the expected location of the gastric fundus.    Cardiac silhouette is exaggerated by AP portable  technique and probably not increased.  Pulmonary vascularity remains mildly increased.  Patchy bilateral alveolar opacification remains, most likely due to consolidation or edema, with some interval increase focally at the right lung apex.  Since the previous examination, there has been an interval increase in the size of the right-sided pneumothorax.  Small left-sided pneumothorax remains.  No pleural effusion.  Visualized osseous structures are stable.      Impression    As above.  Interval increase in size of right-sided pneumothorax and focal opacification at the right lung apex.    This report was flagged in Epic as abnormal.      Electronically signed by: Susan Banks MD  Date:    06/29/2019  Time:    08:02    and X-Ray Chest PA and Lateral (CXR):   Results for orders placed or performed during the hospital encounter of 06/17/19   X-Ray Chest PA And Lateral    Narrative    EXAMINATION:  XR CHEST PA AND LATERAL    CLINICAL HISTORY:  s/p bilateral lung transplant;    TECHNIQUE:  PA and lateral views of the chest were performed.    FINDINGS:  There is a right-sided chest port with its tip within the SVC.  There is a left-sided central venous line with its tip within the SVC.  There are bilateral perihilar opacities with interstitial prominence consistent with pulmonary vascular congestion versus infection.  The cardiac silhouette is not enlarged.  The osseous structures are unremarkable.      Impression    As above.      Electronically signed by: Robinson Piedra MD  Date:    07/13/2019  Time:    09:56       Pending Diagnostic Studies:     Procedure Component Value Units Date/Time    APTT [[id number]] Collected:  06/17/19 1751    Order Status:  Sent Lab Status:  In process Updated:  06/17/19 1753    Specimen:  Blood     HIV 1/2 Ag/Ab (4th Gen) [[id number]] Collected:  06/17/19 1751    Order Status:  Sent Lab Status:  In process Updated:  06/17/19 1753    Specimen:  Blood     Hepatitis B Viral DNA, Quantitative  [[id number]] Collected:  06/17/19 1751    Order Status:  Sent Lab Status:  In process Updated:  06/17/19 1753    Specimen:  Blood     Hepatitis B surface antigen [[id number]] Collected:  06/17/19 1751    Order Status:  Sent Lab Status:  In process Updated:  06/17/19 1753    Specimen:  Blood     Hepatitis C RNA, quantitative, PCR [[id number]] Collected:  06/17/19 1751    Order Status:  Sent Lab Status:  In process Updated:  06/17/19 1753    Specimen:  Blood     IR Tunneled Cath Removal w/o Port [[id number]] Resulted:  07/16/19 1405    Order Status:  Sent Lab Status:  In process Updated:  07/16/19 1433    Protime-INR [[id number]] Collected:  06/17/19 1751    Order Status:  Sent Lab Status:  In process Updated:  06/17/19 1753    Specimen:  Blood     Voriconazole [[id number]] Collected:  07/15/19 2213    Order Status:  Sent Lab Status:  In process Updated:  07/15/19 2244    Specimen:  Blood         Final Active Diagnoses:    Diagnosis Date Noted POA    PRINCIPAL PROBLEM:  S/P lung transplant [Z94.2] 10/24/2015 Not Applicable    Immunosuppression [D89.9] 06/12/2014 Yes    Prophylactic antibiotic [Z79.2] 06/30/2014 Not Applicable    Acute blood loss anemia [D62] 06/18/2019 No    Anxiety disorder [F41.9] 10/08/2013 Yes    Chronic pain with opiate use [G89.29] 12/20/2013 Yes    CF related Pancreatic insufficiency [K86.89] 12/20/2013 Yes    Aspergillus [B44.9] 07/10/2019 No    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 [A49.8, Z16.19] 06/22/2019 No    Candida glabrata infection [B37.9] 07/06/2019 No    Adrenal cortical steroids causing adverse effect in therapeutic use [T38.0X5A] 06/12/2014 Yes    Impaired functional mobility and endurance [Z74.09] 07/10/2019 Unknown    Lung transplant status, bilateral [Z94.2] 07/05/2019 Not Applicable    Prophylactic immunotherapy [Z29.8] 06/18/2019 Not Applicable    Hyperglycemia [R73.9] 06/12/2014 Yes      Problems Resolved During this Admission:    Diagnosis  Date Noted Date Resolved POA    Thrombocytopenia, unspecified [D69.6] 12/04/2018 07/01/2019 No    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] 12/19/2016 06/18/2019 Yes    Steroid-induced hyperglycemia [R73.9, T38.0X5A] 10/03/2018 07/11/2019 Yes    On enteral nutrition [Z78.9] 06/25/2019 07/10/2019 No    Acute hyperactive delirium due to another medical condition [F05] 06/21/2019 07/01/2019 No    Awaiting organ transplant [Z76.82] 06/19/2019 06/22/2019 Not Applicable    Awaiting organ transplant [Z76.82] 06/17/2019 06/18/2019 Not Applicable    Constipation [K59.00] 06/14/2014 07/01/2019 Yes    Awaiting transplantation of lung [Z76.82] 12/03/2013 06/18/2019 Not Applicable      Discharged Condition: stable    Disposition: Home or Self Care    Medications:  Reconciled Home Medications:      Medication List      START taking these medications    dapsone 100 MG Tab  Take 1 tablet (100 mg total) by mouth once daily.     hydrocortisone 1 % cream  Apply topically 2 (two) times daily.     JANUVIA 100 MG Tab  Generic drug:  SITagliptin  Take 1 tablet (100 mg total) by mouth once daily.     valGANciclovir 450 mg Tab  Commonly known as:  VALCYTE  Take 1 tablet (450 mg total) by mouth 2 (two) times daily.     voriconazole 200 MG Tab  Commonly known as:  VFEND  Take 1 tablet (200 mg total) by mouth 2 (two) times daily.        CHANGE how you take these medications    CREON 36,000-114,000- 180,000 unit Cpdr  Generic drug:  lipase-protease-amylase  Take 4 capsules by mouth 3 (three) times daily with meals. Take 3 with snacks as needed.  What changed:  how much to take     LORazepam 1 MG tablet  Commonly known as:  ATIVAN  Take 1 tablet (1 mg total) by mouth every 8 (eight) hours as needed for Anxiety.  What changed:    · medication strength  · how much to take  · how to take this  · when to take this  · reasons to take this  · additional instructions     mycophenolate 250 mg Cap  Commonly known as:  CELLCEPT  Take 1 capsule (250  mg total) by mouth 2 (two) times daily.  What changed:  how much to take     omeprazole 40 MG capsule  Commonly known as:  PRILOSEC  Take 1 capsule (40 mg total) by mouth once daily.  What changed:  when to take this     * oxyCODONE 5 MG immediate release tablet  Commonly known as:  ROXICODONE  Take 10 mg by mouth every 4 (four) hours as needed for Pain.  What changed:  Another medication with the same name was changed. Make sure you understand how and when to take each.     * oxyCODONE 5 MG immediate release tablet  Commonly known as:  ROXICODONE  Take 1 tablet (5 mg total) by mouth every 6 (six) hours as needed.  What changed:    · how much to take  · how to take this  · when to take this  · reasons to take this     predniSONE 5 MG tablet  Commonly known as:  DELTASONE  Take 3 tablets (15 mg total) by mouth once daily.  What changed:  how much to take     tacrolimus 1 MG Cap  Commonly known as:  PROGRAF  Take 1 capsule (1 mg total) by mouth every 12 (twelve) hours.  What changed:    · how much to take  · Another medication with the same name was removed. Continue taking this medication, and follow the directions you see here.         * This list has 2 medication(s) that are the same as other medications prescribed for you. Read the directions carefully, and ask your doctor or other care provider to review them with you.            CONTINUE taking these medications    diphenhydrAMINE 50 MG tablet  Commonly known as:  BENADRYL  Take 1 tablet (50 mg total) by mouth every 6 (six) hours as needed for Itching.     fexofenadine 180 MG tablet  Commonly known as:  ALLEGRA  Take 180 mg by mouth once daily.     fluticasone propionate 50 mcg/actuation nasal spray  Commonly known as:  FLONASE  INSERT 2 SPRAYS IN EACH NOSTRIL DAILY     folic acid 1 MG tablet  Commonly known as:  FOLVITE  Take 1 tablet (1,000 mcg total) by mouth once daily.     gabapentin 300 MG capsule  Commonly known as:  NEURONTIN  Take 1 capsule (300 mg  total) by mouth 3 (three) times daily.     inhalation spacing device  Commonly known as:  PROCHAMBER  USE AS DIRECTED     levalbuterol 1.25 mg/3 mL nebulizer solution  Commonly known as:  XOPENEX  Take 3 mLs (1.25 mg total) by nebulization every 8 (eight) hours as needed for Wheezing or Shortness of Breath. Rescue     levalbuterol 45 mcg/actuation inhaler  Commonly known as:  XOPENEX HFA  Inhale 1-2 puffs into the lungs.     magnesium oxide 400 mg (241.3 mg magnesium) tablet  Commonly known as:  MAG-OX  Take 1 tablet (400 mg total) by mouth 2 (two) times daily.     metoprolol tartrate 25 MG tablet  Commonly known as:  LOPRESSOR  Take 1 tablet (25 mg total) by mouth 2 (two) times daily.     montelukast 10 mg tablet  Commonly known as:  SINGULAIR  Take 1 tablet (10 mg total) by mouth once daily.     morphine 15 MG 12 hr tablet  Commonly known as:  MS CONTIN  Take 1 tablet by mouth twice a day     multivit,min52-folic-vitK-cQ10 100-700-10 mcg-mcg-mg Cap cap  Commonly known as:  AQUADEKS  1 tab twice daily     ondansetron 8 MG tablet  Commonly known as:  ZOFRAN  Take 1 tablet (8 mg total) by mouth every 12 (twelve) hours as needed for Nausea.     OYSTER SHELL CALCIUM-VIT D3 500 mg(1,250mg) -200 unit per tablet  Generic drug:  calcium-vitamin D3  Take 1 tablet by mouth 2 (two) times daily with meals.     polyethylene glycol 17 gram Pwpk  Commonly known as:  GLYCOLAX  Take 17 g by mouth 2 (two) times daily.     QUEtiapine 25 MG Tab  Commonly known as:  SEROQUEL  Take 1 tablet by mouth in morning and 2 tablets at bedtime     topiramate 25 MG tablet  Commonly known as:  TOPAMAX  Take 1 tablet (25 mg total) by mouth 2 (two) times daily     * venlafaxine 37.5 MG 24 hr capsule  Commonly known as:  EFFEXOR-XR  Take 1 capsule (37.5 mg total) by mouth once daily.     * venlafaxine 150 MG Cp24  Commonly known as:  EFFEXOR-XR  Take 1 capsule (150 mg total) by mouth once daily.         * This list has 2 medication(s) that are the  "same as other medications prescribed for you. Read the directions carefully, and ask your doctor or other care provider to review them with you.            STOP taking these medications    amLODIPine 10 MG tablet  Commonly known as:  NORVASC     butalbital-acetaminophen-caffeine -40 mg -40 mg per tablet  Commonly known as:  FIORICET, ESGIC     CENTRUM FLAVOR BURST ADULT Chew  Generic drug:  multivitamin-minerals no.55     DULERA 100-5 mcg/actuation Hfaa  Generic drug:  mometasone-formoterol     fluconazole 150 MG Tab  Commonly known as:  DIFLUCAN     promethazine 25 MG tablet  Commonly known as:  PHENERGAN     sodium chloride 3% 3 % nebulizer solution     tiZANidine 4 MG tablet  Commonly known as:  ZANAFLEX     tobramycin with nebulizer 300 mg/5 mL Nebu          Patient Instructions:      WALKER FOR HOME USE     Order Specific Question Answer Comments   Type of Walker: Radu (4'4"-5'7")    With wheels? No    Height: 5' 1" (1.549 m)    Weight: 54.8 kg (120 lb 13 oz)    Length of need (1-99 months): 99    Does patient have medical equipment at home? wheelchair    Does patient have medical equipment at home? oxygen    Please check all that apply: Walker will be used for gait training.    Please check all that apply: Patient is unable to safely ambulate without equipment.    Vendor: Other (use comments) Pt owns wheelchair   Expected Date of Delivery: 7/16/2019      Referral to OutPatient  Physical therapy   Referral Priority: Routine Referral Type: Physical Medicine   Referral Reason: Specialty Services Required   Requested Specialty: Physical Therapy   Number of Visits Requested: 1     Referral to Outpatient Occupational therapy   Referral Priority: Routine Referral Type: Occupational Therapy   Referral Reason: Specialty Services Required   Requested Specialty: Occupational Therapy   Number of Visits Requested: 1     Ambulatory Referral to Infusion Dept   Referral Priority: Routine Referral Type: " Consultation   Referral Reason: Specialty Services Required   Requested Specialty: IV Infusion   Number of Visits Requested: 1     Diet Adult Regular     Notify your health care provider if you experience any of the following:     Notify your health care provider if you experience any of the following:  increased confusion or weakness     Notify your health care provider if you experience any of the following:  persistent dizziness, light-headedness, or visual disturbances     Notify your health care provider if you experience any of the following:  worsening rash     Notify your health care provider if you experience any of the following:  severe persistent headache     Notify your health care provider if you experience any of the following:  difficulty breathing or increased cough     Notify your health care provider if you experience any of the following:  redness, tenderness, or signs of infection (pain, swelling, redness, odor or green/yellow discharge around incision site)     Notify your health care provider if you experience any of the following:  severe uncontrolled pain     Notify your health care provider if you experience any of the following:  persistent nausea and vomiting or diarrhea     Notify your health care provider if you experience any of the following:  temperature >100.4     Activity as tolerated     Follow Up:  Follow-up Information     Jake Alvarez MD On 7/19/2019.    Specialty:  Transplant  Why:  Routine follow up after hospital discharge  Contact information:  [address] [facility] [address]  [phone]                   Shama Canales PA-C  Lung Transplant  [facility]

## 2019-07-17 NOTE — PLAN OF CARE
Problem: Adult Inpatient Plan of Care  Goal: Plan of Care Review  Outcome: Ongoing (interventions implemented as appropriate)  Pt is AAOx4 in bed wearing non-skid footwear, bed in low/locked position and with call bell within reach. Pt reminded to use call bell to call for assistance, pt verbalizes understanding.  at bedside. Pt is afebrile at this time. Proper hand hygiene performed before and after pt care activities. Isolation maintained for . Bedtime BG of 60, glucose tablets administered. Repeat . PRN Oxycodone administered this shift. Denies any pain or discomfort at this time.

## 2019-07-18 NOTE — PROGRESS NOTES
Patient was discharged from the hospital on 7/17/19.  Post-transplant education and discussion about first outpatient appointments was provided by Enedina Lawson RN, on 7/16/19.  Patient is scheduled to RTC on 7/19/19.

## 2019-07-18 NOTE — ADDENDUM NOTE
Addendum  created 07/18/19 1513 by Robina Cedeno MD    Charge Capture section accepted, Sign clinical note

## 2019-07-18 NOTE — ADDENDUM NOTE
Addendum  created 07/18/19 1522 by Robina Cedeno MD    Charge Capture section accepted, Sign clinical note

## 2019-07-18 NOTE — PROGRESS NOTES
DISCHARGE NOTE:    Juanita Ibarra is a 29 y.o. female s/p BLT #2.       6/18/2019 (Lung), 6/12/2014 (Lung) secondary to CF.  Extubated to HFNC on POD#2, but was re-intubated 6/21 for hypercapnic respiratory failure 9 (2/2 sedating medications).      Pseudomonas:  previously from 6/20 and 6/23 bronchial wash. Received Tobramycin x1 on 6/22 and was transitioned from Merrem to Cipro.  Bronchoscopy 7/5 with  PSA and placed on Zerbaxa. + Aspergillus antigen and started on voriconazole while inpatient.  Discharged with IV Abx.    Plan:   Started on Zerbaxa 7/9. Per ID recs, home IV infusion of Zerbaxa through 7/24.    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 States she can tolerate it when given with 50 mg Benadryl and ran over 3 hours    Neupogen [filgrastim] Other (See Comments)     Ostealgia after five daily doses of 300 mcg.      Bactrim [sulfamethoxazole-trimethoprim] Hives    Ceftazidime Hives     Pt stated can tolerate cefapine not ceftazidime    Ceftazidime     Dronabinol Other (See Comments)     Mental changes/hallucinations    Haldol [haloperidol lactate] Other (See Comments)     Seizure like activity    Nsaids (non-steroidal anti-inflammatory drug)      Cannot have due to lung transplant    Adhesive Rash     Cloth tape- please use tegaderm or paper tape     Aztreonam Rash    Ciprofloxacin Nausea And Vomiting     Projectile N/V, per patient.  Unwilling to retry therapy.       Patient Pharmacy: [facility]     Discharge Medications:   Juanita Ibarra   Home Medication Instructions [id number]    Printed on:07/18/19 2127   Medication Information                      calcium-vitamin D3 (OS-KATY 500 + D3) 500 mg(1,250mg) -200 unit per tablet  Take 1 tablet by mouth 2 (two) times daily with meals.             dapsone 100 MG Tab  Take 1 tablet (100 mg total) by mouth once daily.             diphenhydrAMINE (BENADRYL) 50 MG tablet  Take 1 tablet (50 mg total) by mouth every 6 (six) hours as needed for Itching.             fexofenadine (ALLEGRA) 180 MG tablet  Take 180 mg by mouth once daily.             fluticasone propionate (FLONASE) 50 mcg/actuation nasal spray  INSERT 2 SPRAYS IN EACH NOSTRIL DAILY             folic acid (FOLVITE) 1 MG tablet  Take 1 tablet (1,000 mcg total) by mouth once daily.             gabapentin (NEURONTIN) 300 MG capsule  Take 1 capsule (300 mg total) by mouth 3 (three) times daily.             hydrocortisone 1 % cream  Apply topically 2 (two) times daily.             inhalation spacing device ([employer])  USE AS DIRECTED             levalbuterol (XOPENEX HFA) 45 mcg/actuation inhaler  Inhale 1-2 puffs into the lungs.             levalbuterol (XOPENEX) 1.25 mg/3 mL nebulizer solution  Take 3 mLs (1.25 mg total) by nebulization every 8 (eight) hours as needed for Wheezing or Shortness of Breath. Rescue             lipase-protease-amylase (CREON) 36,000-114,000- 180,000 unit CpDR  Take 4 capsules by mouth 3 (three) times daily with meals. Take 3 with snacks as needed.             LORazepam (ATIVAN) 1 MG tablet  Take 1 tablet (1 mg total) by mouth every 8 (eight) hours as needed for Anxiety.             magnesium oxide (MAG-OX) 400 mg (241.3 mg magnesium) tablet  Take 1 tablet (400 mg total) by mouth 2 (two) times daily.              metoprolol tartrate (LOPRESSOR) 25 MG tablet  Take 1 tablet (25 mg total) by mouth 2 (two) times daily.             montelukast (SINGULAIR) 10 mg tablet  Take 1 tablet (10 mg total) by mouth once daily.             morphine (MS CONTIN) 15 MG 12 hr tablet  Take 1 tablet by mouth twice a day             multivit,min52-folic-vitK-cQ10 (AQUADEKS) 100-700-10 mcg-mcg-mg Cap cap  1 tab twice daily             mycophenolate (CELLCEPT) 250 mg Cap  Take 1 capsule (250 mg total) by mouth 2 (two) times daily.             omeprazole (PRILOSEC) 40 MG capsule  Take 1 capsule (40 mg total) by mouth once daily.             ondansetron (ZOFRAN) 8 MG tablet  Take 1 tablet (8 mg total) by mouth every 12 (twelve) hours as needed for Nausea.             oxycodone (ROXICODONE) 5 MG immediate release tablet  Take 10 mg by mouth every 4 (four) hours as needed for Pain.             oxyCODONE (ROXICODONE) 5 MG immediate release tablet  Take 1 tablet (5 mg total) by mouth every 6 (six) hours as needed.             polyethylene glycol (GLYCOLAX) 17 gram PwPk  Take 17 g by mouth 2 (two) times daily.             predniSONE (DELTASONE) 5 MG tablet  Take 3 tablets (15 mg total) by mouth once daily.             QUEtiapine (SEROQUEL) 25 MG Tab  Take 1 tablet by mouth in morning and 2 tablets at bedtime             SITagliptin (JANUVIA) 100 MG Tab  Take 1 tablet (100 mg total) by mouth once daily.             tacrolimus (PROGRAF) 1 MG Cap  Take 1 capsule (1 mg total) by mouth every 12 (twelve) hours.             topiramate (TOPAMAX) 25 MG tablet  Take 1 tablet (25 mg total) by mouth 2 (two) times daily             valGANciclovir (VALCYTE) 450 mg Tab  Take 1 tablet (450 mg total) by mouth 2 (two) times daily.             venlafaxine (EFFEXOR-XR) 150 MG Cp24  Take 1 capsule (150 mg total) by mouth once daily.             venlafaxine (EFFEXOR-XR) 37.5 MG 24 hr capsule  Take 1 capsule (37.5 mg total) by mouth once daily.             voriconazole (VFEND)  200 MG Tab  Take 1 tablet (200 mg total) by mouth 2 (two) times daily.                 Pharmacy Interventions/Recommendations:  1) Transplant Immunosuppression: tacrolimus, MMF, pred taper    2) Opportunistic Infection prophylaxis: voriconazole (treatment), valganciclovir, dapsone    3) Patient Counseling/Education:  Patient and caregiver.  Demonstrated the use of the BP cuff, thermometer.    4) Follow-Up/Discharge Needs:    -  voriconazole level needed as an outpatient    5) Patient received prescriptions for:  none    Juanita and her caregiver verbalized their understanding and had the opportunity to ask questions.

## 2019-07-18 NOTE — PROGRESS NOTES
Medication education was provided to Juanita Ibarra and her caregiver(s).  Reviewed medication section of the Lung Transplant Education book that was provided.  Emphasized the importance of compliance, role of the blue medication card, concerns for drug interactions, and process of obtaining refills.  Counseled regarding Prograf (tacrolimus), Cellcept (mycophenolate), and prednisone, including directions for use, monitoring, how to handle missed doses, and side effects.  Juanita Ibarra and her caregiver verbalized understanding and had the opportunity to ask questions.

## 2019-07-18 NOTE — PROGRESS NOTES
Patient discharged from hospital to home on 7/17/19.  Post education was done on 7/16/19 and discharge instructions were reviewed with patient and significant other.  A copy of the outpatient clinic instructions was provided to the patient.  Patient will follow up in clinic on Friday 7/19/19.  Explained to patient to get [facility] then come to the transplant clinic for labs, spirometry and clinic visit with Dr. Alvarez.  Reminded patient to get to x-ray around 8 AM and to hold morning dose of Prograf until after labs are done.  Patient and significant had questions that were answered to their satisfaction and verbalized understanding of all that was discussed.

## 2019-07-22 NOTE — H&P (VIEW-ONLY)
LUNG TRANSPLANT RECIPIENT FOLLOW-UP    Reason for Visit: Follow-up after lung transplantation.                                                                                                         Date of Transplant:   1. 6/12/14  2. 6/18/19                                                                               Reason for Transplant: CARLOS EDUARDO                                                                               Type of Transplant: bilateral sequential lung                                                                               CMV Status: D- / R+     Hepatitis C Status:  Donor Ab:(--)  Donor ANY:(--)  Recipient serostatus:(--)  Treatment status: n/a                                                                              Major Complications:   1. Post-operative pain                                                                               History of Present Illness: Juanita Ibarra is a 29 y.o. year old female who presents to her first clinic visit after lung re-transplantation for CARLOS EDUARDO. She was discharged earlier this week. Apart from early re-intubation because of hypercapnia her admission was uneventful. Her pain control was her major issue.    She says today that she feels well. She denies shortness of breath, fevers, or night sweats. Still having cough. Her strength is improving. Continues to complain of nausea when she takes several of her medication and antibiotics, this has been a chronic problem for her. Does complain of visual trouble (likely from steroids). Will see her pain physician next week.    Review of Systems   Constitutional: Positive for malaise/fatigue. Negative for chills, diaphoresis, fever and weight loss.   HENT: Negative for congestion, ear discharge, ear pain, hearing loss, nosebleeds and sore throat.    Eyes: Positive for blurred vision. Negative for double vision and photophobia.   Respiratory: Positive for cough. Negative for hemoptysis, sputum  "production, shortness of breath and wheezing.    Cardiovascular: Negative for chest pain, palpitations, orthopnea, claudication, leg swelling and PND.   Gastrointestinal: Positive for nausea. Negative for abdominal pain, blood in stool, constipation, diarrhea, heartburn, melena and vomiting.   Genitourinary: Negative for dysuria, flank pain, frequency, hematuria and urgency.   Musculoskeletal: Positive for myalgias. Negative for back pain, falls, joint pain and neck pain.   Skin: Negative for itching and rash.   Neurological: Positive for weakness. Negative for dizziness, tremors, sensory change, loss of consciousness and headaches.   Endo/Heme/Allergies: Does not bruise/bleed easily.   Psychiatric/Behavioral: Negative for depression, hallucinations and memory loss. The patient is not nervous/anxious and does not have insomnia.      /85 (BP Location: Right arm, Patient Position: Sitting)   Pulse 107   Temp 97.6 °F (36.4 °C)   Resp 20   Ht 5' 1" (1.549 m)   Wt 60.8 kg (134 lb)   LMP 10/02/2016   SpO2 95%   BMI 25.32 kg/m²     Physical Exam   Constitutional: She is oriented to person, place, and time and well-developed, well-nourished, and in no distress. No distress.   HENT:   Head: Normocephalic and atraumatic.   Nose: Nose normal.   Mouth/Throat: Oropharynx is clear and moist. No oropharyngeal exudate.   Eyes: Pupils are equal, round, and reactive to light. Conjunctivae and EOM are normal. No scleral icterus.   Neck: Normal range of motion. Neck supple. No JVD present. No tracheal deviation present. No thyromegaly present.   Cardiovascular: Normal rate, regular rhythm and intact distal pulses. Exam reveals no gallop and no friction rub.   No murmur heard.  Pulmonary/Chest: Effort normal. No stridor. No respiratory distress. She has no wheezes. She has no rales. She exhibits no tenderness.   Abdominal: Soft. Bowel sounds are normal. She exhibits no distension and no mass. There is no tenderness. There " is no rebound and no guarding.   Musculoskeletal: Normal range of motion. She exhibits no edema.   Lymphadenopathy:     She has no cervical adenopathy.   Neurological: She is alert and oriented to person, place, and time. No cranial nerve deficit. Gait normal. Coordination normal.   Skin: Skin is warm and dry. No rash noted. She is not diaphoretic. No erythema. No pallor.   Clean clamshell incision   Psychiatric: Mood and affect normal.     Labs:  cbc, cmp, tacrolimus Latest Ref Rng & Units 7/16/2019 7/17/2019 7/19/2019   TACROLIMUS LVL 5.0 - 15.0 ng/mL 6.2 5.6 7.0   WHITE BLOOD CELL COUNT 3.90 - 12.70 K/uL 3.71(L) 3.78(L) 6.88   RBC 4.00 - 5.40 M/uL 3.06(L) 2.94(L) 3.52(L)   HEMOGLOBIN 12.0 - 16.0 g/dL 8.7(L) 8.5(L) 10.1(L)   HEMATOCRIT 37.0 - 48.5 % 29.6(L) 28.5(L) 34.6(L)   MCV 82 - 98 fL 97 97 98   MCH 27.0 - 31.0 pg 28.4 28.9 28.7   MCHC 32.0 - 36.0 g/dL 29.4(L) 29.8(L) 29.2(L)   RDW 11.5 - 14.5 % 21.1(H) 21.2(H) 20.7(H)   PLATELETS 150 - 350 K/uL 171 172 261   MPV 9.2 - 12.9 fL 11.9 11.1 11.4   GRAN # 1.8 - 7.7 K/uL 2.9 2.7 5.4   LYMPH # 1.0 - 4.8 K/uL 0.7(L) 0.9(L) 1.1   MONO # 0.3 - 1.0 K/uL 0.1(L) 0.1(L) 0.2(L)   EOSINOPHIL% 0.0 - 8.0 % 0.5 0.3 0.3   BASOPHIL% 0.0 - 1.9 % 0.0 0.5 0.6   DIFFERENTIAL METHOD - Automated Automated Automated   SODIUM 136 - 145 mmol/L 140 140 138   POTASSIUM 3.5 - 5.1 mmol/L 4.2 4.3 4.0   CHLORIDE 95 - 110 mmol/L 106 109 107   CO2 23 - 29 mmol/L 24 25 22(L)   GLUCOSE 70 - 110 mg/dL 143(H) 97 92   BUN BLD 6 - 20 mg/dL 14 12 19   CREATININE 0.5 - 1.4 mg/dL 0.9 0.8 0.9   CALCIUM 8.7 - 10.5 mg/dL 8.7 8.4(L) 9.9   PROTEIN TOTAL 6.0 - 8.4 g/dL 5.8(L) 5.5(L) 6.7   ALBUMIN 3.5 - 5.2 g/dL 2.7(L) 2.4(L) 3.1(L)   BILIRUBIN TOTAL 0.1 - 1.0 mg/dL 0.3 0.3 0.4   ALK PHOS 55 - 135 U/L 135 326(H) 243(H)   AST 10 - 40 U/L 14 48(H) 12   ALT 10 - 44 U/L 17 48(H) 26   ANION GAP 8 - 16 mmol/L 10 6(L) 9   EGFR IF A[employer] AMERICAN >60 mL/min/1.73 m:2 >60.0 >60.0 >60.0   EGFR IF NON- >60  mL/min/1.73 m:2 >60.0 >60.0 >60.0     Pulmonary Function Tests 7/19/2019 6/6/2019 5/1/2019 1/9/2019 11/26/2018 10/22/2018 6/18/2018   FVC 1.45 1.42 1.45 1.79 1.85 2.23 2.52   FEV1 1.27 0.59 0.61 0.71 0.78 0.9 0.93   TLC (liters) - - - - - - -   DLCO (ml/mmHg sec) - - - - - - -   FVC% 42.5 44 42.5 56 54.2 65.3 78   FEV1% 43.6 21 20.9 25 26.6 30.9 33   FEF 25-75 - - 0.24 0.28 0.33 0.35 0.35   FEF 25-75% - - 7.4 8 9.8 10.5 11   TLC% - - - - - - -   RV - - - - - - -   RV% - - - - - - -   DLCO% - - - - - - -       Imaging:  Results for orders placed during the hospital encounter of 07/19/19   X-Ray Chest PA And Lateral    Narrative EXAMINATION:  XR CHEST PA AND LATERAL    CLINICAL HISTORY:  Lung transplant status    TECHNIQUE:  PA and lateral views of the chest were performed.    COMPARISON:  Chest radiographs status post most recent lung transplant: 07/13/2019.  07/09/2019.  07/06/2019.  06/17/2019.    Modified barium swallow: 07/03/2019.    Chest radiographs pre transplant: 06/06/2019.  04/05/2019.  01/09/2019.    FINDINGS:  Additional history: This patient with cystic fibrosis underwent her 1st bilateral sequential lung transplant 5 years ago; 7 transverse sternal wires reflect that 1st transplant as seen on the preoperative study of 06/17/2019..  The patient is status post repeat bilateral sequential lung transplant performed 06/18/2019, revealed by 2 sagittally oriented wires at the inferior aspect of the sternum reflecting recent clamshell sternotomy.  Following the 2nd transplant the patient was discharged on 07/16/2019.    Port and catheters:    -Port overlies the right upper lung zone with attached catheter extending to overlie the mediastinum near the junction of SVC and right atrium.  This device was present on earlier studies such as 06/17/2019 and 06/06/2019.    -Vascular catheter placed via the left neck has its tip projected near the junction of SVC and right atrium.  This catheter is been present since at  least 07/06/2019.    Mediastinal structures are midline.    There is atelectasis of the right middle lobe obscuring the right atrial margin on PA view; on lateral view it presents as a triangular opacity overlying the cardiac silhouette with sharply defined margins reflecting the minor and major fissures.  The degree of atelectasis is marked.  It raises the question of bronchial obstruction as might occur with retained secretions in this patient with a history of cystic fibrosis.    Soft tissue opacity in the posterior aspect of the right hemithorax, at least 5.5 x 3.6 cm, projects over the lateral aspect of the right mid and upper lung zones on PA view and over the thoracic spine on lateral view.  Considering its well-defined margins on PA view I suspect it represents fluid in the fissure.  Differential diagnosis includes aspiration and pneumonia, noting that the patient had penetration and aspiration on the most recent modified barium swallow, 07/03/2019.    X-ray beam filtration by generous overlying soft tissues causes increased conspicuity of pulmonary interstitium in the mid and lower lung zones on PA chest radiograph.  I do not detect convincing evidence of interstitial lung disease on lateral view.    I detect no cardiac decompensation, freely layering pleural fluid, pneumothorax, pneumomediastinum, pneumoperitoneum or significant osseous abnormality.      Impression Unusual appearance of the chest radiograph is similar to the most recent study dated 07/13/2019.  No new disease identified.      Electronically signed by: Adela Davalos MD  Date:    07/19/2019  Time:    09:50       Assessment:  1. Encounter following organ transplant    2. Lung transplanted    3. Immunosuppression    4. Prophylactic antibiotic    5. Aspergillosis    6. Debility    7. Other chronic postprocedural pain      Plan:   1. Will continue to monitor her FEV1 on weekly basis for now. It is still reduced with a restrictive pattern which  is what I would expect based on her weakness/pain. I expect this to improve. CXR and oxygenation are stable. Also finishing antibiotics.    2. Continue tacrolimus, mycophenolate, and prednisone. Adjust tacrolimus to keep level between 10-12.    3. Continue dapsone and valganciclovir.    4. Continue voriconazole for at least three months.    5. Currently having home therapy. Will transition to pulmonary rehab when appropriate.    6. Continue current pain regimen. Follow up with her pain physician.    .7. RTC in 1 week

## 2019-07-22 NOTE — TELEPHONE ENCOUNTER
----- Message from Efren Charles sent at 7/22/2019  2:04 PM CDT -----  Contact: Aletha/Transplant  Caller to schedule a appt for pt, Caller states pt's incision has opened up.      Ext [phone]

## 2019-07-22 NOTE — TELEPHONE ENCOUNTER
"Received a call from transplant  stating that Gisel was on the phone requesting to speak with a transplant coordinator regarding her incision.  Call transferred.  Gisel stated that she noticed today that she had drainage on her shirt.  When she looked at her incision, she noticed that it was no longer well approximated and was open under her right breast.  She stated that it is approximately the length of her right breast and she "can see the stitches".  Serous drainage noted from the site.  She stated that she has been trying to increase her activity, and feels this may have caused the incision to open.  She stated that she needs to use a walker to get around and has been putting "a lot" of pressure on the walker.  She inquired if she could clean the opened portion of the incision with peroxide and place a gauze and a tegaderm on the site.  Instructed Gisel not to use peroxide or the tegaderm.  Instructed her to cover the area with gauze and use tape to secure the dressing.  Informed her to clean around the site with soap and water if necessary, but not the opened area itself until someone evaluates the incision.  Informed her that I would discuss with Dr. Alvarez and would call back with instructions.  She verbalized her understanding.     She also inquired if she was supposed to be discharged home on Micafungin.  She stated that Govind did not receive any orders for the Micafungin, but she was taking it in the hospital.  She stated that Govind received the orders for Zerbaxa, but not micafungin.  Informed Gisel that Aletha, her transplant coordinator, is not available currently.  Informed her that I would discuss with Aletha and she can follow-up with the team regarding the plan regarding the micafungin.  She again verbalized her understanding.    Inquired about any new complaints or symptoms.  She stated, "I am mildly congested."  Asked her if she had chest or sinus congestion.  She stated that she has chest " "congestion.  She denied fever or any other complaints.  She stated that she was unable to start her Xopenex nebs until today because she was unable to get her nebulizer from [facility].  She stated that the Xopenex did "open" her "airways" when she took it today.  She was unable to produce any sputum over the weekend, but she did cough up a small amount of thick, dark yellow sputum during our conversation.  She inquired if she should take the hypertonic saline 3%.  Informed her that I would discuss with Dr. Alvarez.  She verbalized her understanding of all discussed.    Notified DENNISE Lawson, transplant coordinator, of above.  Notified Dr. Alvarez of above conversation with Gisel.  Instructed to schedule an appointment with CTS for evaluation of incision.  Instructions received to follow the blue card for medication instructions.  If hypertonic saline neb is on the blue card, she can take it.  If not on the blue card, then instructed not to use it.  No additional orders received.  Appointment already scheduled for 7/24/19.  Reassessment to be made at that appointment.    Notified DENNISE Lawson of Dr. Alvarez's instructions.  She will arrange for an appointment with CTS and will call Gisel with instructions.    "

## 2019-07-22 NOTE — PHYSICIAN QUERY
PT Name: Juanita Ibarra  MR #: [id number]     Physician Query Form - Documentation Clarification      CDS/: Krupa Fish               Contact information: [email or web address]     This form is a permanent document in the medical record.     Query Date: July 22, 2019    By submitting this query, we are merely seeking further clarification of documentation. Please utilize your independent clinical judgment when addressing the question(s) below.    The Medical record reflects the following:    Supporting Clinical Findings Location in Medical Record   reinutbated for hypoxic respiratory failure    Intubated and sedated following inability to protect airway yesterday     She remained altered throughout the am and decision was made to electively intubate for airway protection.  CXR with worsening opacities.  Plan for repeat bronchoscopy today.    Do you agree with the Consultants diagnosis of ___ hypoxic respiratory failure___________________?  NO    Hospital Course: Patient underwent uncomplicated BLT (retransplant) on 6/18/19. Extubated to HFNC on POD#2, but was re-intubated 6/21 for hypercapnic respiratory failure (2/2 sedating medications).  ID PN 6/22     Lung transplant PN 6/22        Lung transplant PN 6/22        Physician Query 6/24      DC Summary 7/17                                                                                Doctor, Please specify diagnosis or diagnoses associated with above clinical findings.    Provider Use Only    ( x ) Acute hypercapnic respiratory failure ruled in     (  ) Acute hypercapnic respiratory failure ruled out     ( x ) Other_Reintubated for acute HYPERCAPNIC (not hypoxic) resp failure causing obtundation leading to inability to protect airway____                                                                                                           [  ] Clinically Undetermined

## 2019-07-22 NOTE — PROGRESS NOTES
7/19/19   Patient in clinic today for her first outpatient post lung transplant follow up visit. Arrived ambulatory accompanied by her significant other Vanda In good spirits and verbalized that she has had no difficulty with the outpatient process, thus far.   Instructed her  to always arrive between 08:00 - 08:30 for lab work and remember not to take am Prograf dose before blood has been drawn. Patient reported an 8 on a 1-10 pain scale for incisional pain (clamshell) and generalized pain - verified that she is following strict sternal precaution guidelines. Reviewed contact numbers for reaching us during and after office hours - stressed importance of early reporting and plan to discuss anything that is, or may seem like, an issue before the end of the office day. For urgent issues that develop or worsen after hours, stressed importance of contacting Lung Transplant MD on call; reviewed importance of requesting med refills - clarified that she, or a family member, is responsible for calling the pharmacy for refills which should be done when down to no less than 5-7 days left of a medication - reminded her that meds may run out at different rates due to dose changes sos he needs to be on top of when each one will run out; if there are no remaining refills, call the pharmacy anyway, they will request the refill auth. Do not wait until Fridays to request refills, especially if you know there are no more automatic refills remaining - refills need to be handled during office hours.  Patient verbalized her understanding of all.

## 2019-07-23 PROBLEM — T81.32XA STERNAL WOUND DEHISCENCE: Status: ACTIVE | Noted: 2019-01-01

## 2019-07-23 NOTE — TELEPHONE ENCOUNTER
Returned call to patient who c/o pain to her right flank this morning when waking up.  The pain was then gone after a short time, she fell back to sleep and when she woke the second time this morning the pain returned only when changing positions.  The pain is now gone. Described the pain as a sharp pain.  Patient will be seeing CTS this afternoon and will see us tomorrow.  Advised patient that if the pain returns and does not resolve, to let us know.  Patient will discuss this pain with CTS when she sees them this afternoon.  Patient states that if the pain returns and stays she will contact us to let us know.  Otherwise, she will be eveluated tomorrow when she comes to clinic.  Patient did not have any further questions at this time.

## 2019-07-23 NOTE — TELEPHONE ENCOUNTER
----- Message from Gregory Howell sent at 7/23/2019 10:48 AM CDT -----  Contact: patient   Please call pt at [phone]    Patient is having severe pain on the right side since this morning    Thank you

## 2019-07-23 NOTE — TELEPHONE ENCOUNTER
Called pt to remined about her appt time , ask if she can't make today appt pls. Call and reschule . Had to leave message on V/M .

## 2019-07-24 NOTE — HPI
This is 28 y/o woman w/a history of CF (c/b pancreatic insufficiency, sinus disease, MRSA/Pseudomonas colonization c/b numerous antibiotics allergies, and subsequent COPD/ESLD; s/p BOLT 6/12/2014, CMV D+/R-, simulect induction, on maintenance tacro/MMF/pred; c/b multiple episodes of MRSA/Pseudomonas pneumonia/sinusitis, C.glabrata early post-operative colonization 7/2014, A1 rejection 8/2014 s/p pulse SM, prior CMV reactivations, and subsequent grade 3 CARLOS EDUARDO relisted for transplant) who was admitted on 6/17/2019 for redo bilateral lung transplant (s/p BOLT 6/18/2019, CMV D-/R+, basiliximab induction, on maintenance tacro/MMF/pred; c/b  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 ID was consulted for recent fevers (on 7/5), SOB, and non-productive cough due to ongoing  Pseudomonas pneumonia (with increasing MDR pattern) and invasive aspergillosis (with positive BAL antigen; multiple risk factors). She remains tenuous but is showing improvement on zerbaxa/vori for  2wks (stop date: 7/24/2019); and voriconazole for at least 6wks for invasive aspergillosis with 2wks of concurrent micafungin as combination therapy (ensure adequate coverage of C.glabrata as well -- azole sensitivities pending on Candida isolate to facilitate cessation of micafungin)- presented with wound dehiscence since 07/20 with minimal yellowish clear discharge, pt was admitted had I&D at bed side - Two wound cultures were sent from these opened areas.  The poorly healing fat and subcutaneous tissue was sharply resected until well vascularized tissue was noted.  The would was cleaned again with betadine and a wound vacuum system was connected. She was started on Vancomycin and Cefepime.     supple/no JVD

## 2019-07-24 NOTE — PLAN OF CARE
Problem: Adult Inpatient Plan of Care  Goal: Plan of Care Review  Outcome: Ongoing (interventions implemented as appropriate)  Patient is AAOx4, VSS. O2 sats maintained > 90% on RA. PRN breathing treatment administered x 1 this shift. Refusing cardiac monitoring. Tmax 99.1. IV Cef and Vanc continued. Pre-medicated Vanc with Benadryl as ordered. Contact precautions maintained. Proper hand hygiene performed before and after patient care activities. Wound vac to R. Oyster shell incision @ 125 mmHG. No output noted. PRN oxy administered x 2 this shift for pain. PRN zofran administered x 1 for nausea. Patient remained free from falls and injury this shift. Bed locked and in lowest position, call light within reach,  at bedside attentive to patient. Instructed to call staff for mobility and patient verbalized understanding. Will continue to monitor.

## 2019-07-24 NOTE — PROGRESS NOTES
Pharmacokinetic Initial Assessment: IV Vancomycin    Assessment/Plan:    Initiate intravenous vancomycin with loading dose of 1500 mg once followed by a maintenance dose of vancomycin 750mg IV every 12 hours  Desired empiric serum trough concentration is 10 to 20 mcg/mL.  Draw vancomycin trough level 30 min prior to fourth dose on 7/25 at approximately 08:00  Pharmacy will continue to follow and monitor vancomycin.      Please contact pharmacy at extension [phone] with any questions regarding this assessment.     Thank you for the consult,   Elieser Her     Patient brief summary:  Juanita Ibarra is a 29 y.o. female initiated on antimicrobial therapy with IV Vancomycin for treatment of suspected skin & soft tissue    Drug Allergies:   Review of patient's allergies indicates:   Allergen Reactions    Albuterol Palpitations    Colistin Anaphylaxis    Vancomycin analogues      Infusion reaction that does not resolve with slowing  Pt. States she can tolerate it when given with 50 mg Benadryl and ran over 3 hours    Neupogen [filgrastim] Other (See Comments)     Ostealgia after five daily doses of 300 mcg.      Bactrim [sulfamethoxazole-trimethoprim] Hives    Ceftazidime Hives     Pt stated can tolerate cefapine not ceftazidime    Ceftazidime     Dronabinol Other (See Comments)     Mental changes/hallucinations    Haldol [haloperidol lactate] Other (See Comments)     Seizure like activity    Nsaids (non-steroidal anti-inflammatory drug)      Cannot have due to lung transplant    Adhesive Rash     Cloth tape- please use tegaderm or paper tape    Aztreonam Rash    Ciprofloxacin Nausea And Vomiting     Projectile N/V, per patient.  Unwilling to retry therapy.       Actual Body Weight:   54.4 kg    Renal Function:   Estimated Creatinine Clearance: 78.5 mL/min (based on SCr of 0.9 mg/dL).,     Dialysis Method (if applicable):  none    CBC (last 72 hours):  No results for input(s): WHITE BLOOD CELL COUNT,  HEMOGLOBIN, HEMATOCRIT, PLATELETS, GRAN%, LYMPH%, MONO%, EOSINOPHIL%, BASOPHIL%, DIFFERENTIAL METHOD in the last 72 hours.    Metabolic Panel (last 72 hours):  No results for input(s): SODIUM, POTASSIUM, CHLORIDE, CO2, GLUCOSE, BUN BLD, CREATININE, ALBUMIN, BILIRUBIN TOTAL, ALK PHOS, AST, ALT, MAGNESIUM, PHOSPHORUS in the last 72 hours.    Drug levels (last 3 results):  No results for input(s): VANCOMYCINRA, VANCOMYCINPE, VANCOMYCINTR in the last 72 hours.    Microbiologic Results:  Microbiology Results (last 7 days)     Procedure Component Value Units Date/Time    Culture, Anaerobe [[id number]] Collected:  07/23/19 1926    Order Status:  Sent Specimen:  Wound from Sternum Updated:  07/23/19 1939    Aerobic culture [[id number]] Collected:  07/23/19 1926    Order Status:  Sent Specimen:  Wound from Sternum Updated:  07/23/19 1938

## 2019-07-24 NOTE — H&P
[facility]  History & Physical  Cardiothoracic Surgery    SUBJECTIVE:     Chief Complaint/Reason for Admission: Incisional infection     History of Present Illness:  Patient is a 29 y.o. female presents S/p LUT 6/18/2019 for CF. Pt had right surgical incision pain, redness and dehiscence . Pt had wound vac placed in clinic and was admitted for wound evaluation and ID consult.       PTA Medications   Medication Sig    calcium-vitamin D3 (OS-KATY 500 + D3) 500 mg(1,250mg) -200 unit per tablet Take 1 tablet by mouth 2 (two) times daily with meals.    dapsone 100 MG Tab Take 1 tablet (100 mg total) by mouth once daily.    diphenhydrAMINE (BENADRYL) 50 MG tablet Take 1 tablet (50 mg total) by mouth every 6 (six) hours as needed for Itching.    fexofenadine (ALLEGRA) 180 MG tablet Take 180 mg by mouth once daily.    fluticasone propionate (FLONASE) 50 mcg/actuation nasal spray INSERT 2 SPRAYS IN EACH NOSTRIL DAILY    folic acid (FOLVITE) 1 MG tablet Take 1 tablet (1,000 mcg total) by mouth once daily.    gabapentin (NEURONTIN) 300 MG capsule Take 1 capsule (300 mg total) by mouth 3 (three) times daily.    hydrocortisone 1 % cream Apply topically 2 (two) times daily.    inhalation spacing device (PROCHAMBER) USE AS DIRECTED    levalbuterol (XOPENEX) 1.25 mg/3 mL nebulizer solution Take 3 mLs (1.25 mg total) by nebulization every 8 (eight) hours as needed for Wheezing or Shortness of Breath. Rescue    lipase-protease-amylase (CREON) 36,000-114,000- 180,000 unit CpDR Take 4 capsules by mouth 3 (three) times daily with meals. Take 3 with snacks as needed. (Patient taking differently: Take 5 capsules by mouth 3 (three) times daily with meals. )    LORazepam (ATIVAN) 1 MG tablet Take 1 tablet (1 mg total) by mouth every 8 (eight) hours as needed for Anxiety.    magnesium oxide (MAG-OX) 400 mg (241.3 mg magnesium) tablet Take 1 tablet (400 mg total) by mouth 2 (two) times daily.    metoprolol  tartrate (LOPRESSOR) 25 MG tablet Take 1 tablet (25 mg total) by mouth 2 (two) times daily.    montelukast (SINGULAIR) 10 mg tablet Take 1 tablet (10 mg total) by mouth once daily.    morphine (MS CONTIN) 15 MG 12 hr tablet Take 1 tablet by mouth twice a day    multivit,min52-folic-vitK-cQ10 (AQUADEKS) 100-700-10 mcg-mcg-mg Cap cap 1 tab twice daily    mycophenolate (CELLCEPT) 250 mg Cap Take 1 capsule (250 mg total) by mouth 2 (two) times daily.    omeprazole (PRILOSEC) 40 MG capsule Take 1 capsule (40 mg total) by mouth once daily.    ondansetron (ZOFRAN) 8 MG tablet Take 1 tablet (8 mg total) by mouth every 12 (twelve) hours as needed for Nausea.    oxyCODONE (ROXICODONE) 5 MG immediate release tablet Take 1 tablet (5 mg total) by mouth every 6 (six) hours as needed.    polyethylene glycol (GLYCOLAX) 17 gram PwPk Take 17 g by mouth 2 (two) times daily.    predniSONE (DELTASONE) 5 MG tablet Take 3 tablets (15 mg total) by mouth once daily.    QUEtiapine (SEROQUEL) 25 MG Tab Take 1 tablet by mouth in morning and 2 tablets at bedtime    SITagliptin (JANUVIA) 100 MG Tab Take 1 tablet (100 mg total) by mouth once daily.    tacrolimus (PROGRAF) 1 MG Cap Take 1 capsule (1 mg total) by mouth every 12 (twelve) hours.    topiramate (TOPAMAX) 25 MG tablet Take 1 tablet (25 mg total) by mouth 2 (two) times daily    valGANciclovir (VALCYTE) 450 mg Tab Take 1 tablet (450 mg total) by mouth 2 (two) times daily.    venlafaxine (EFFEXOR-XR) 150 MG Cp24 Take 1 capsule (150 mg total) by mouth once daily.    venlafaxine (EFFEXOR-XR) 37.5 MG 24 hr capsule Take 1 capsule (37.5 mg total) by mouth once daily.    voriconazole (VFEND) 200 MG Tab Take 1 tablet (200 mg total) by mouth 2 (two) times daily.       Review of patient's allergies indicates:   Allergen Reactions    Albuterol Palpitations    Colistin Anaphylaxis    Vancomycin analogues      Infusion reaction that does not resolve with slowing  Pt. States she can  tolerate it when given with 50 mg Benadryl and ran over 3 hours    Neupogen [filgrastim] Other (See Comments)     Ostealgia after five daily doses of 300 mcg.      Bactrim [sulfamethoxazole-trimethoprim] Hives    Ceftazidime Hives     Pt stated can tolerate cefapine not ceftazidime    Ceftazidime     Dronabinol Other (See Comments)     Mental changes/hallucinations    Haldol [haloperidol lactate] Other (See Comments)     Seizure like activity    Nsaids (non-steroidal anti-inflammatory drug)      Cannot have due to lung transplant    Adhesive Rash     Cloth tape- please use tegaderm or paper tape    Aztreonam Rash    Ciprofloxacin Nausea And Vomiting     Projectile N/V, per patient.  Unwilling to retry therapy.       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     Past Surgical History:   Procedure Laterality Date    ABDOMINAL SURGERY      peg tube     [id number] N/A 5/19/2015    Performed by [facility]y Dias Jr[facility] at [facility] OR    BIOPSY-BRONCHUS N/A 12/20/2016    Performed by Jake [facility]varez MD at [facility] OR 2ND FLR    Bronchoscopy N/A 7/5/2019    Performed by Francisca Morin DO at [facility] OR [facility]ND [facility]    BRONCHOSCOPY Bilateral 12/11/2018    Performed by Francisc[facility] Morin DO at [facility] OR [facility]ND [facility]    BRONCHOSCOPY N/A 10/1/2018    Performed by Jaek Alvarez MD at [facility] OR [facility]ND [facility]LR    BRONCHOSCOPY Bilateral 12/20/2016    Performed by Jake [facility]varez MD at [facility] OR [facility]    BRONCHOSCOPY - flexible bronchoscopy with probable tissue biopsy CPT 60268 N/A 7/21/2015     Performed by [facility] at [facility] OR 2ND FLR    BRONCHOSCOPY - flexible bronchoscopy with probable tissue biospy CPT 15630 N/A 10/20/2015    Performed by Jake Alvarez MD at [facility] OR [facility]ND [facility]LR    BRONCHOSCOPY - flexible bronchoscopy with tissue biopsy CPT 72527 N/A 9/29/2015    Performed by Jake Alvarez MD at [facility] OR [facility]    BRONCHOSCOPY - flexible bronchoscopy with tissue biopsy CPT 85190 N/A 5/26/2015    Performed by Jake Alvarez MD at [facility] OR 1ST FLR    BRONCHOSCOPY flexible bronchoscopy with tissue biopsy N/A 9/8/2014    Performed by Jake Alvarez MD at [facility] OR 2ND FLR    BRONCHOSCOPY flexible with possible tissue biopsy N/A 8/8/2014    Performed by Jake Alvarez MD at [facility] OR [facility]    BRONCHOSCOPY, BAL, BIOPSIES N/A 3/20/2018    Performed by Jake Alvarez MD at [facility] OR [facility]    BRONCHOSCOPY-FIBEROPTIC N/A 1/29/2016    Performed by Joann Cifuentes DO at [facility] OR 2ND FLR    BRONCHOSCOPY; Bronchoscopy with BAL and transbronchial biopsies under general anesthesia N/A 5/29/2018    Performed by Jake Alvarez MD at [facility] OR 2ND FLR    CHOLECYSTECTOMY  2016    CHOLECYSTECTOMY-LAPAROSCOPIC N/A 7/22/2016    Performed by Joshua Goldberg, MD at [facility] OR 2ND FLR    COLONOSCOPY N/A 5/3/2019    Performed by Juancho Rich MD at [facility] ENDO (2ND FLR)    ENDOMETRIAL ABLATION  2015    KJB    ETHMOIDECTOMY Bilateral 4/9/2019    Performed by Adin Burks III, MD at [facility] OR 2ND FLR    FESS      FESS Bilateral 10/21/2013    Performed by Jared Whatley MD at [facility] OR 2ND FLR    FESS, USING COMPUTER-ASSISTED NAVIGATION N/A 4/9/2019    Performed by Adin Bukrs III, MD at [facility] OR 2ND FLR    FINE NEEDLE ASPIRATION (FNA)  of a cervical lymphadenopathy  1/29/2016    Performed by Joann Cifuentes DO at [facility] OR 2ND FLR    flex bronchoscopy with probable tissue biopsy N/A 7/31/2014    Performed by [facility] Diagnostic Provider at [facility] OR 2ND FLR    flexible  bronchoscopy with tissue biopsy N/A 12/11/2014    Performed by Jake [facility]varez MD at [facility] OR 2ND FLR    HYSTERECTOMY  04/2017    [facility]    INJECTION-VOCAL CORD Left 6/24/2014    [facility]    [id number]-CATH to right chest and removal of portacath to left chests wall. Bilateral 6/24/2014    [facility]    LARYNX SURGERY  2016    LUNG TRANSPLANT Bilateral 6/12/14    MAXILLARY ANTROSTOMY  4/9/2019    Performed by Adin Burks III, MD at [facility] OR [address] [place] [address]    MYRINGOTOMY W/ TUBES Right 04/2017    MYRINGOTOMY WITH INSERTION OF PE TUBES Right 4/15/2017    Performed by Gary Null MD at [facility] OR [address] [place] [address]    PLACEMENT-TUBE-PEG  5/15/2014    Performed by David Moses MD at [facility] OR [address] [place] [address]    PORTACATH PLACEMENT Right     rt sc    REDO BILATERAL LUNG TRANSPLANT; CLAMSHELL Bilateral 6/18/2019    Performed by Jonathan Newby MD at [facility] OR [address] [place] [address]    REMOVAL-TUBE-PEG  5/15/2014    Performed by David Moses MD at [facility] OR [address] [place] [address]    RHC for lung re-transplant N/A 1/22/2019    Performed by Laura Rachel MD at [facility] CATH LAB    ROBOTIC ASSISTED LAPAROSCOPIC HYSTERECTOMY N/A 4/25/2017    Performed by [facility]y Dias Jr[facility] MD at [facility] OR    SALPINGECTOMY Bilateral 2015    K[facility]    SALPINGECTOMY-LAPAROSCOPIC Bilateral 5/19/2015    Performed by Deyn Dias Jr., MD at [facility] OR    SINUS SURGERY FUNCTIONAL ENDOSCOPIC WITH NAVIGATION Bilateral 4/3/2017    [facility]lsen [facility] at [facility]    SINUS SURGERY FUNCTIONAL ENDOSCOPIC WITH NAVIGATION, 25339, 02251, 39136, 02704 Bilateral 2/5/2015    [facility]    SPHENOIDECTOMY Bilateral 4/9/2019    Performed by Adin Burks III, MD at [facility] OR [address] [place] [address]    THYROPLASTY - Medialization laryngoplasty, cricothyroid subluxation, arytenoid repositioning Left 8/2/2016    [facility]    TRANSPLANT-LUNG Bilateral 6/11/2014    [facility]     Family History   Problem Relation Age of Onset    Thrombocytopenia Maternal Grandfather     Drug abuse Maternal Grandfather     Diabetes Maternal Grandfather     Hypertension Maternal Grandmother     Cancer Maternal Grandmother         pancreatic cancer    Breast cancer Neg Hx     Colon cancer Neg Hx     Ovarian cancer Neg Hx     Cirrhosis Neg Hx      Social History     Tobacco Use    Smoking status: Never Smoker    Smokeless tobacco: Never Used   Substance Use Topics    Alcohol use: No     Comment: Has not had an alcoholic drink in more than 2 months.    Drug use: No        Review of Systems:  Constitutional: no fever or chills, pain not controlled  Respiratory: no cough or shortness of breath  Cardiovascular: no chest pain or palpitations  Gastrointestinal: no nausea or vomiting, tolerating diet  Musculoskeletal: no arthralgias or myalgias  Neurological: no seizures or tremors  Behavioral/Psych: no auditory or visual hallucinations    OBJECTIVE:     Vital Signs (Most Recent):  Temp: 99.1 °F (37.3 °C) (07/24/19 0828)  Pulse: 101 (07/24/19 0828)  Resp: 18 (07/24/19 0828)  BP: (!) 126/90 (07/24/19 0828)  SpO2: (!) 92 % (07/24/19 0828)    Admission: Weight: 54.4 kg (119 lb 14.9 oz) (07/23/19 1937)   Most Recent: Weight: 54.4 kg (119 lb 14.9 oz) (07/23/19 1937)    Physical Exam:  General: well developed, well nourished, no distress  Lungs:  clear to auscultation bilaterally and normal respiratory effort  Chest Wall: right sided chest wall tenderness  Heart: regular rate and rhythm, S1, S2 normal, no murmur, rub or gallop  Abdomen: soft nontender   Skin: Skin color, texture, turgor normal. No rashes or lesions  Neurologic: Alert and oriented. Thought content appropriate    Laboratory:  CBC:   Recent Labs   Lab 07/24/19  0632   WBC 5.03   RBC 2.98*   HGB 8.7*   HCT 30.1*      *    MCH 29.2   MCHC 28.9*     BMP:   Recent Labs   Lab 07/24/19  0632   *      K 4.3      CO2 22*   BUN 20   CREATININE 1.0   CALCIUM 9.0   MG 1.5*       Diagnostic Results:  All diagnotics and labs reviewed.  ASSESSMENT/PLAN:   Pt is a 28y/o Fe s/p LUT for CF 6/2019 with wound dehisced     -sternal precautions  -regular diet   -admit to cts  -wound vac placed by Dr. Newby  -consulted ID for abx therapy  -consulted speech for dysphagia  -Continue vanco and cefepime  -wound culture pending   -continue home meds  - will set up HH for wound care   -port for access

## 2019-07-24 NOTE — PLAN OF CARE
Problem: Adult Inpatient Plan of Care  Goal: Plan of Care Review  Outcome: Ongoing (interventions implemented as appropriate)  Pt AAOx4, VSS, in bed with upper siderails raised x2, bed in lowest/locked position, call light/personal belongings within reach. Pt instructed to call for assistance. Pt verbalizes understanding. Pt afebrile at this time.  Proper hand hygiene performed before and after pt care.     R breast/upper quadrant incision with wound vac to cont suction @ 125. No output so far this shift. Black sponge added in clinic, hooked to suction once pt arrived to floor. Cx of wound sent.   Pt on contact precautions for MRSA + .  R CW port accessed by patient (7/21).   Vanc + Cefepime started. WBC and Cr stable. Afebrile.   Premedicating with Benadryl before Vanc administration.  Pain moderately controlled with PRN Oxy 10mg. Pt states she takes 15mg OxyContin at home in addition to Oxy 10mg - notified MD Misha - no new orders at this time.  Anxiety well controlled with PRN Ativan.  Pt c/o trouble swallowing since last admit, states she cannot take meds without pudding. This RN observed patient drinking Dr. Pepper and eating Popeyes - pt on regular diet per orders.  Pt up to toilet with 1 person assist, will order walker per pt request. UOP measured via hat, no bm so far this shift.   Pt refusing telemetry.    at bedside attentive to needs.     Will continue to monitor patient.

## 2019-07-24 NOTE — CONSULTS
[facility]  Infectious Disease  Consult Note    Patient Name: Juanita Ibarra  MRN: [id number]  Admission Date: 7/23/2019  Hospital Length of Stay: 0 days  Attending Physician: Jonathan Newby MD  Primary Care Provider: Primary Doctor No     Isolation Status: No active isolations    Patient information was obtained from patient, past medical records and ER records.      Inpatient consult to Infectious Diseases  Consult performed by: Aaliyah Vernon MD  Consult ordered by: Nerissa Burch MD        Assessment/Plan:     * Sternal wound dehiscence  This is 28 y/o woman w/a history of CF (c/b pancreatic insufficiency, sinus disease, MRSA/Pseudomonas colonization c/b numerous antibiotics allergies, and subsequent COPD/ESLD; s/p BOLT 6/12/2014, CMV D+/R-, simulect induction, on maintenance tacro/MMF/pred; c/b multiple episodes of MRSA/Pseudomonas pneumonia/sinusitis, C.glabrata early post-operative colonization 7/2014, A1 rejection 8/2014 s/p pulse SM, prior CMV reactivations, and subsequent grade 3 CARLOS EDUARDO relisted for transplant) who was admitted on 6/17/2019 for redo bilateral lung transplant (s/p BOLT 6/18/2019, CMV D-/R+, basiliximab induction, on maintenance tacro/MMF/pred; c/b  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 ID was consulted for recent fevers (on 7/5), SOB, and non-productive cough due to ongoing  Pseudomonas pneumonia (with increasing MDR pattern) and invasive aspergillosis (with positive BAL antigen; multiple risk factors). She remains tenuous but is showing improvement on zerbaxa/vori for  2wks (stop date: 7/24/2019); and voriconazole for at least 6wks for invasive aspergillosis with 2wks of concurrent micafungin as combination therapy (ensure adequate coverage of C.glabrata as well -- azole sensitivities pending on Candida isolate to facilitate cessation of  micafungin)- presented with wound dehiscence since 07/20 with minimal yellowish clear discharge, pt was admitted had I&D at bed side - Two wound cultures were sent from these opened areas.  The poorly healing fat and subcutaneous tissue was sharply resected until well vascularized tissue was noted.  The would was cleaned again with betadine and a wound vacuum system was connected. She was started on Vancomycin and Cefepime.        Recommendation:    1- Follow up wound cultures to tailor Abx.  2- Agree with empiric Vancomycin and Cefepime for now.  3- wound care for right inguinal non-healing scar.  4- follow up in ID clinic.          Thank you for your consult. I will sign off. Please contact us if you have any additional questions.    Aaliyah Vernon MD  Infectious Disease  [facility]    Subjective:     Principal Problem: Sternal wound dehiscence    HPI: This is 30 y/o woman w/a history of CF (c/b pancreatic insufficiency, sinus disease, MRSA/Pseudomonas colonization c/b numerous antibiotics allergies, and subsequent COPD/ESLD; s/p BOLT 6/12/2014, CMV D+/R-, simulect induction, on maintenance tacro/MMF/pred; c/b multiple episodes of MRSA/Pseudomonas pneumonia/sinusitis, C.glabrata early post-operative colonization 7/2014, A1 rejection 8/2014 s/p pulse SM, prior CMV reactivations, and subsequent grade 3 CARLOS EDUARDO relisted for transplant) who was admitted on 6/17/2019 for redo bilateral lung transplant (s/p BOLT 6/18/2019, CMV D-/R+, basiliximab induction, on maintenance tacro/MMF/pred; c/b  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 ID was consulted for recent fevers (on 7/5), SOB, and non-productive cough due to ongoing  Pseudomonas pneumonia (with increasing MDR pattern) and invasive aspergillosis (with positive BAL antigen; multiple risk factors). She remains tenuous but is showing improvement on  zerbaxa/vori for  2wks (stop date: 7/24/2019); and voriconazole for at least 6wks for invasive aspergillosis with 2wks of concurrent micafungin as combination therapy (ensure adequate coverage of C.glabrata as well -- azole sensitivities pending on Candida isolate to facilitate cessation of micafungin)- presented with wound dehiscence since 07/20 with minimal yellowish clear discharge, pt was admitted had I&D at bed side - Two wound cultures were sent from these opened areas.  The poorly healing fat and subcutaneous tissue was sharply resected until well vascularized tissue was noted.  The would was cleaned again with betadine and a wound vacuum system was connected. She was started on Vancomycin and Cefepime.      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       Past Surgical History:   Procedure Laterality Date    ABDOMINAL SURGERY      peg tube     [id number] N/A 5/19/2015    Performed by Deny Dias Jr., MD at [facility] [place]    BIOPSY-BRONCHUS N/A 12/20/2016    Performed by Jake Alvarez MD at [facility] OR 2ND FLR    Bronchoscopy N/A 7/5/2019    Performed by Francisca Morin DO at [facility] OR 2ND [facility]LR    BRONCHOSCOPY Bilateral 12/11/2018    Performed by Francisca Morin DO at [facility] OR 2ND [facility]LR    BRONCHOSCOPY N/A 10/1/2018    Performed by Jake Alvarez MD at [facility] OR [facility]ND [facility]    BRONCHOSCOPY Bilateral 12/20/2016    Performed by Jake Alvarez MD at [facility] OR [facility]ND [facility]LR    BRONCHOSCOPY - flexible bronchoscopy with probable tissue biopsy CPT 39330 N/A 7/21/2015    Performed by  Jake Alvarez MD at [facility] 2ND [facility]    BRONCHOSCOPY - flexible bronchoscopy with probable tissue biospy CPT 85368 N/A 10/20/2015    Performed by Jake Alvarez MD at [facility]ND [facility]    BRONCHOSCOPY - flexible bronchoscopy with tissue biopsy CPT 86698 N/A 9/29/2015    Performed by Jake Alvarez MD at [facility]    BRONCHOSCOPY - flexible bronchoscopy with tissue biopsy CPT 87290 N/A 5/26/2015    Performed by Jake Alvarez MD at [facility] OR 1ST FLR    BRONCHOSCOPY flexible bronchoscopy with tissue biopsy N/A 9/8/2014    Performed by Jake Alvarez MD at [facility]ND [facility]    BRONCHOSCOPY flexible with possible tissue biopsy N/A 8/8/2014    Performed by Jake Alvarez MD at [facility]ND [facility]    BRONCHOSCOPY, BAL, BIOPSIES N/A 3/20/2018    Performed by Jake Alvarez MD at [facility] 2ND FLR    BRONCHOSCOPY-FIBEROPTIC N/A 1/29/2016    Performed by Joann Cifuentes DO at [facility] OR 2ND FLR    BRONCHOSCOPY; Bronchoscopy with BAL and transbronchial biopsies under general anesthesia N/A 5/29/2018    Performed by Jake Alvarez MD at [facility] 2ND FLR    CHOLECYSTECTOMY  2016    CHOLECYSTECTOMY-LAPAROSCOPIC N/A 7/22/2016    Performed by Joshua Goldberg, MD at [facility] OR 2ND FLR    COLONOSCOPY N/A 5/3/2019    Performed by Juancho Rich MD at [facility] ENDO (2ND FLR)    ENDOMETRIAL ABLATION  2015    KJB    ETHMOIDECTOMY Bilateral 4/9/2019    Performed by Adin Burks III, MD at [facility] OR 2ND FLR    FESS      FESS Bilateral 10/21/2013    Performed by Jared Whatley MD at [facility] OR 2ND FLR    FESS, USING COMPUTER-ASSISTED NAVIGATION N/A 4/9/2019    Performed by Adin Burks III, MD at [facility] OR 2ND FLR    FINE NEEDLE ASPIRATION (FNA)  of a cervical lymphadenopathy  1/29/2016    Performed by Joann Cifuentes DO at [facility] OR 2ND FLR    flex bronchoscopy with probable tissue biopsy N/A 7/31/2014    Performed by [facility] Provider at [facility] OR 2ND FLR    flexible bronchoscopy with  tissue biopsy N/A 12/11/2014    Performed by Jake Alvarez MD at [facility] OR 2ND FLR    HYSTERECTOMY  04/2017    [facility]    INJECTION-VOCAL CORD Left 6/24/2014    [facility]    [id number]-N-CATH to right chest and removal of portacath to left chests wall. Bilateral 6/24/2014    [facility]    LARYNX SURGERY  2016    LUNG TRANSPLANT Bilateral 6/12/14    MAXILLARY ANTROSTOMY  4/9/2019    Per[facility]    MYRINGOTOMY W/ TUBES Right 04/2017    MYRINGOTOMY WITH INSERTION OF PE TUBES Right 4/15/2017    [facility]    PLACEMENT-TUBE-PEG  5/15/2014    [facility]    PORTACATH PLACEMENT Right     rt sc    REDO BILATERAL LUNG TRANSPLANT; CLAMSHELL Bilateral 6/18/2019    Performed by Jonathan Newby MD at [facility] OR [address] [place] [address]    REMOVAL-TUBE-PEG  5/15/2014    Performed by David Moses MD at [facility] OR [address] [place] [address]    RHC for lung re-transplant N/A 1/22/2019    Performed by Laura Rachel MD at [facility] CATH LAB    ROBOTIC ASSISTED LAPAROSCOPIC HYSTERECTOMY N/A 4/25/2017    Performed by Deny Dias Jr., MD at [facility] OR    SALPINGECTOMY Bilateral 2015    KJB    SALPINGECTOMY-LAPAROSCOPIC Bilateral 5/19/2015    Performed by Deny Dias Jr., MD at [facility] OR    SINUS SURGERY FUNCTIONAL ENDOSCOPIC WITH NAVIGATION Bilateral 4/3/2017    Per[facility]    SINUS SURGERY FUNCTIONAL ENDOSCOPIC WITH NAVIGATION, 07221, 68522, 49970, 98031 Bilateral 2/5/2015    [facility]    SPHENOIDECTOMY Bilateral 4/9/2019    Performed by Adin Burks III, MD at [facility] OR [address] [place] [address]    THYROPLASTY - Medialization laryngoplasty, cricothyroid subluxation, arytenoid repositioning Left 8/2/2016    Performed by Gary Null MD at [facility] OR [address] [place] [address]     TRANSPLANT-LUNG Bilateral 6/11/2014    Performed by Adolfo Huston MD at [facility] OR [address] [place] [address]       Review of patient's allergies indicates:   Allergen Reactions    Albuterol Palpitations    Colistin Anaphylaxis    Vancomycin analogues      Infusion reaction that does not resolve with slowing  Pt. States she can tolerate it when given with 50 mg Benadryl and ran over 3 hours    Neupogen [filgrastim] Other (See Comments)     Ostealgia after five daily doses of 300 mcg.      Bactrim [sulfamethoxazole-trimethoprim] Hives    Ceftazidime Hives     Pt stated can tolerate cefapine not ceftazidime    Ceftazidime     Dronabinol Other (See Comments)     Mental changes/hallucinations    Haldol [haloperidol lactate] Other (See Comments)     Seizure like activity    Nsaids (non-steroidal anti-inflammatory drug)      Cannot have due to lung transplant    Adhesive Rash     Cloth tape- please use tegaderm or paper tape    Aztreonam Rash    Ciprofloxacin Nausea And Vomiting     Projectile N/V, per patient.  Unwilling to retry therapy.       Medications:  Medications Prior to Admission   Medication Sig    calcium-vitamin D3 (OS-KATY 500 + D3) 500 mg(1,250mg) -200 unit per tablet Take 1 tablet by mouth 2 (two) times daily with meals.    dapsone 100 MG Tab Take 1 tablet (100 mg total) by mouth once daily.    diphenhydrAMINE (BENADRYL) 50 MG tablet Take 1 tablet (50 mg total) by mouth every 6 (six) hours as needed for Itching.    fexofenadine (ALLEGRA) 180 MG tablet Take 180 mg by mouth once daily.    fluticasone propionate (FLONASE) 50 mcg/actuation nasal spray INSERT 2 SPRAYS IN EACH NOSTRIL DAILY    folic acid (FOLVITE) 1 MG tablet Take 1 tablet (1,000 mcg total) by mouth once daily.    gabapentin (NEURONTIN) 300 MG capsule Take 1 capsule (300 mg total) by mouth 3 (three) times daily.    hydrocortisone 1 % cream Apply topically 2 (two) times daily.    inhalation spacing device (PROCHAMBER) USE AS DIRECTED     levalbuterol (XOPENEX) 1.25 mg/3 mL nebulizer solution Take 3 mLs (1.25 mg total) by nebulization every 8 (eight) hours as needed for Wheezing or Shortness of Breath. Rescue    lipase-protease-amylase (CREON) 36,000-114,000- 180,000 unit CpDR Take 4 capsules by mouth 3 (three) times daily with meals. Take 3 with snacks as needed. (Patient taking differently: Take 5 capsules by mouth 3 (three) times daily with meals. )    LORazepam (ATIVAN) 1 MG tablet Take 1 tablet (1 mg total) by mouth every 8 (eight) hours as needed for Anxiety.    magnesium oxide (MAG-OX) 400 mg (241.3 mg magnesium) tablet Take 1 tablet (400 mg total) by mouth 2 (two) times daily.    metoprolol tartrate (LOPRESSOR) 25 MG tablet Take 1 tablet (25 mg total) by mouth 2 (two) times daily.    montelukast (SINGULAIR) 10 mg tablet Take 1 tablet (10 mg total) by mouth once daily.    morphine (MS CONTIN) 15 MG 12 hr tablet Take 1 tablet by mouth twice a day    multivit,min52-folic-vitK-cQ10 (AQUADEKS) 100-700-10 mcg-mcg-mg Cap cap 1 tab twice daily    mycophenolate (CELLCEPT) 250 mg Cap Take 1 capsule (250 mg total) by mouth 2 (two) times daily.    omeprazole (PRILOSEC) 40 MG capsule Take 1 capsule (40 mg total) by mouth once daily.    ondansetron (ZOFRAN) 8 MG tablet Take 1 tablet (8 mg total) by mouth every 12 (twelve) hours as needed for Nausea.    oxyCODONE (ROXICODONE) 5 MG immediate release tablet Take 1 tablet (5 mg total) by mouth every 6 (six) hours as needed.    polyethylene glycol (GLYCOLAX) 17 gram PwPk Take 17 g by mouth 2 (two) times daily.    predniSONE (DELTASONE) 5 MG tablet Take 3 tablets (15 mg total) by mouth once daily.    QUEtiapine (SEROQUEL) 25 MG Tab Take 1 tablet by mouth in morning and 2 tablets at bedtime    SITagliptin (JANUVIA) 100 MG Tab Take 1 tablet (100 mg total) by mouth once daily.    tacrolimus (PROGRAF) 1 MG Cap Take 1 capsule (1 mg total) by mouth every 12 (twelve) hours.    topiramate (TOPAMAX) 25  MG tablet Take 1 tablet (25 mg total) by mouth 2 (two) times daily    valGANciclovir (VALCYTE) 450 mg Tab Take 1 tablet (450 mg total) by mouth 2 (two) times daily.    venlafaxine (EFFEXOR-XR) 150 MG Cp24 Take 1 capsule (150 mg total) by mouth once daily.    venlafaxine (EFFEXOR-XR) 37.5 MG 24 hr capsule Take 1 capsule (37.5 mg total) by mouth once daily.    voriconazole (VFEND) 200 MG Tab Take 1 tablet (200 mg total) by mouth 2 (two) times daily.     Antibiotics (From admission, onward)    Start     Stop Route Frequency Ordered    07/24/19 1633  ceFEPIme injection 1 g      -- IV Every 8 hours (non-standard times) 07/24/19 1310    07/24/19 1000  vancomycin (VANCOCIN) 750 mg in dextrose 5 % 250 mL IVPB      -- IV Every 12 hours (non-standard times) 07/23/19 2003 07/24/19 0900  dapsone tablet 100 mg      -- Oral Daily 07/23/19 1956        Antifungals (From admission, onward)    Start     Stop Route Frequency Ordered    07/23/19 2100  voriconazole tablet 200 mg      -- Oral 2 times daily 07/23/19 1956        Antivirals (From admission, onward)        Stop Route Frequency     valGANciclovir      -- Oral 2 times daily           Immunization History   Administered Date(s) Administered    Cytomegalovirus Immune Globulin 06/13/2014    DTP 08/23/1994, 08/16/1995    Hepatitis A, Adult 04/04/2018, 01/22/2019    Hepatitis B 08/10/2000, 11/20/2000    Hepatitis B, Pediatric/Adolescent 11/28/2001    Influenza 09/12/2018    Influenza - Quadrivalent - PF 10/04/2017, 09/06/2018    Influenza - Trivalent (ADULT) 11/28/2001, 01/22/2002, 12/10/2002, 11/26/2003, 10/19/2004, 11/15/2005, 11/11/2006, 10/15/2007, 10/28/2009, 11/03/2010, 10/19/2011    Influenza - Trivalent - PF (ADULT) 10/14/2008, 10/22/2012, 11/02/2016    Influenza A (H1N1) 2009 Monovalent - IM - PF 12/01/2009    MMR 08/23/1994, 08/16/1995    Meningococcal Conjugate (MCV4P) 10/24/2008    OPV 08/23/1994, 08/16/1995, 08/10/2000    Pneumococcal Conjugate -  13 Valent 10/02/2017    Pneumococcal Conjugate - 7 Valent 01/22/2002    Pneumococcal Polysaccharide - 23 Valent 01/22/2002    Td (ADULT) 08/10/2000, 12/16/2010    Tdap 01/22/2019    Typhoid - ViCPs 04/06/2018       Family History     Problem Relation (Age of Onset)    Cancer Maternal Grandmother    Diabetes Maternal Grandfather    Drug abuse Maternal Grandfather    Hypertension Maternal Grandmother    Thrombocytopenia Maternal Grandfather        Social History     Socioeconomic History    Marital status: Single     Spouse name: Not on file    Number of children: Not on file    Years of education: Not on file    Highest education level: Not on file   Occupational History    Not on file   Social Needs    Financial resource strain: Not on file    Food insecurity:     Worry: Not on file     Inability: Not on file    Transportation needs:     Medical: Not on file     Non-medical: Not on file   Tobacco Use    Smoking status: Never Smoker    Smokeless tobacco: Never Used   Substance and Sexual Activity    Alcohol use: No     Comment: Has not had an alcoholic drink in more than 2 months.    Drug use: No    Sexual activity: Yes     Partners: Male     Birth control/protection: See Surgical Hx     Comment: current partner since Oct 2016   Lifestyle    Physical activity:     Days per week: Not on file     Minutes per session: Not on file    Stress: Not on file   Relationships    Social connections:     Talks on phone: Not on file     Gets together: Not on file     Attends [employer] service: Not on file     Active member of club or organization: Not on file     Attends meetings of clubs or organizations: Not on file     Relationship status: Not on file   Other Topics Concern    Not on file   Social History Narrative    Not on file     Review of Systems   Constitutional: Negative for appetite change, chills and fever.   HENT: Negative for congestion.    Eyes: Negative for pain and itching.   Respiratory:  Negative for cough, chest tightness and shortness of breath.    Cardiovascular: Negative for palpitations and leg swelling.   Gastrointestinal: Negative for abdominal pain and nausea.   Endocrine: Negative for polyphagia and polyuria.   Genitourinary: Negative for dysuria and frequency.   Musculoskeletal: Negative for back pain.   Neurological: Negative for numbness and headaches.   Psychiatric/Behavioral: Negative for agitation and behavioral problems.     Objective:     Vital Signs (Most Recent):  Temp: 98.3 °F (36.8 °C) (07/24/19 1628)  Pulse: 86 (07/24/19 1628)  Resp: 18 (07/24/19 1628)  BP: (!) 124/92 (07/24/19 1628)  SpO2: (!) 94 % (07/24/19 1628) Vital Signs (24h Range):  Temp:  [97.8 °F (36.6 °C)-99.1 °F (37.3 °C)] 98.3 °F (36.8 °C)  Pulse:  [] 86  Resp:  [16-18] 18  SpO2:  [92 %-96 %] 94 %  BP: (119-146)/(79-99) 124/92     Weight: 54.4 kg (119 lb 14.9 oz)  Body mass index is 23.55 kg/m².    Estimated Creatinine Clearance: 70.6 mL/min (based on SCr of 1 mg/dL).    Physical Exam   Constitutional: She is oriented to person, place, and time. She appears well-developed and well-nourished.   Neck: Normal range of motion. Neck supple.   Pulmonary/Chest: Effort normal.   Wound vac under right breast   Abdominal: Soft. Bowel sounds are normal.   Musculoskeletal: She exhibits no edema.   Right groin hypopigmented (femoral line) scar .   Neurological: She is alert and oriented to person, place, and time.   Skin: Skin is warm and dry.   Psychiatric: She has a normal mood and affect. Her behavior is normal.       Significant Labs:   Blood Culture:   Recent Labs   Lab 04/05/19  1400 04/05/19  1429   LABBLOO No growth after 5 days. No growth after 5 days.     BMP:   Recent Labs   Lab 07/24/19  0632   *      K 4.3      CO2 22*   BUN 20   CREATININE 1.0   CALCIUM 9.0   MG 1.5*     CBC:   Recent Labs   Lab 07/23/19  2225 07/24/19  0632   WBC 5.95 5.03   HGB 8.6* 8.7*   HCT 29.6* 30.1*    189      CMP:   Recent Labs   Lab 07/23/19  2225 07/24/19  0632    139   K 4.7 4.3    107   CO2 27 22*   * 122*   BUN 19 20   CREATININE 0.9 1.0   CALCIUM 9.0 9.0   PROT 6.4 6.0   ALBUMIN 2.8* 2.6*   BILITOT 0.2 0.2   ALKPHOS 174* 166*   AST 8* 11   ALT 10 10   ANIONGAP 7* 10   EGFRNONAA >60.0 >60.0     Respiratory Culture:   Recent Labs   Lab 04/05/19  1429 06/20/19  0808 06/23/19  1735 06/25/19  0900 07/05/19  1445   GSRESP <10 epithelial cells per low power field.  Many WBC's  Many Gram positive cocci  Rare Gram positive rods  Rare yeast <10 epithelial cells per low power field.  No WBC's  No organisms seen <10 epithelial cells per low power field.  Few WBC's  Rare Gram negative rods <10 epithelial cells per low power field.  Many WBC's  No organisms seen Few WBC's  Few Gram positive rods  Few budding yeast   RESPIRATORYC METHICILLIN RESISTANT STAPHYLOCOCCUS AUREUS  Many    PSEUDOMONAS AERUGINOSA  Moderate   No S aureus isolated.  PSEUDOMONAS AERUGINOSA   Rare   No S aureus isolated.  PSEUDOMONAS AERUGINOSA   Few  Normal respiratory anibal also present   No S aureus isolated.  PSEUDOMONAS AERUGINOSA   Few   No S aureus isolated.  PSEUDOMONAS AERUGINOSA   Moderate  Normal respiratory anibal also present  *     Urine Culture:   Recent Labs   Lab 06/17/19  1753   LABURIN No significant growth     Urine Studies:   Recent Labs   Lab 06/17/19  1753   COLORU Yellow   APPEARANCEUA Clear   PHUR 6.0   SPECGRAV 1.015   PROTEINUA Negative   GLUCUA Negative   KETONESU Negative   BILIRUBINUA Negative   OCCULTUA Negative   NITRITE Negative   LEUKOCYTESUR Negative     Wound Culture:   Recent Labs   Lab 04/09/19  1401 06/18/19  0930 07/23/19  1926   LABAERO METHICILLIN RESISTANT STAPHYLOCOCCUS AUREUS  Moderate    PSEUDOMONAS AERUGINOSA  Few   BRANDI ALBICANS  Few  No other significant isolate   No growth     All pertinent labs within the past 24 hours have been reviewed.    Significant  Imaging: I have reviewed all pertinent imaging results/findings within the past 24 hours.

## 2019-07-24 NOTE — SUBJECTIVE & OBJECTIVE
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       Past Surgical History:   Procedure Laterality Date    ABDOMINAL SURGERY      peg tube     XMEQMKKU-AFJZLDXNISH-TAXCHNABCEOI N/A 5/19/2015    Performed by Deny Dias Jr., MD at [facility] OR    BIOPSY-BRONCHUS N/A 12/20/2016    Performed by Jake Alvarez MD at [facility] OR 2ND FLR    Bronchoscopy N/A 7/5/2019    Performed by Francisca Morin DO at [facility] OR 2ND FLR    BRONCHOSCOPY Bilateral 12/11/2018    Performed by Francisca Morin DO at [facility] OR 2ND FLR    BRONCHOSCOPY N/A 10/1/2018    Performed by Jake Alvarez MD at [facility] OR 2ND FLR    BRONCHOSCOPY Bilateral 12/20/2016    Performed by Jake Alvarez MD at [facility] OR 2ND FLR    BRONCHOSCOPY - flexible bronchoscopy with probable tissue biopsy CPT 89257 N/A 7/21/2015    Performed by Jake Alvarez MD at [facility] OR 2ND FLR    BRONCHOSCOPY - flexible bronchoscopy with probable tissue biospy CPT 38019 N/A 10/20/2015    Performed by Jake Alvarez MD at [facility] OR 2ND FLR    BRONCHOSCOPY - flexible bronchoscopy with tissue biopsy CPT 27169 N/A 9/29/2015    Performed by Jake Alvarez MD at [facility] OR 2ND FLR    BRONCHOSCOPY - flexible bronchoscopy with tissue biopsy CPT 29423 N/A 5/26/2015    Performed by Jake Alvarez MD at [facility] OR 1ST FLR    BRONCHOSCOPY flexible bronchoscopy with tissue biopsy N/A 9/8/2014    Performed by Jake Alvarez MD at [facility] OR 2ND FLR    BRONCHOSCOPY flexible with possible tissue biopsy N/A 8/8/2014    Performed by Jake Alvarez MD at [facility] OR 2ND FLR     BRONCHOSCOPY, BAL, BIOPSIES N/A 3/20/2018    [facility]    BRONCHOSCOPY-FIBEROPTIC N/A 1/29/2016    Performed by Joann Cifuentes DO at [facility] OR [address] [place] [address]    BRONCHOSCOPY; Bronchoscopy with BAL and transbronchial biopsies under general anesthesia N/A 5/29/2018    [facility]    CHOLECYSTECTOMY  2016    CHOLECYSTECTOMY-LAPAROSCOPIC N/A 7/22/2016    [facility]    COLONOSCOPY N/A 5/3/2019    Performed by Juancho Rich MD at [facility] ENDO (2ND FLR)    ENDOMETRIAL ABLATION  2015    [facility]    ETHMOIDECTOMY Bilateral 4/9/2019    Performed by [facility]in [facility]ks III[facility]    FESS      FESS Bilateral 10/21/2013    Performed by Jared Whatley MD at [facility] OR [address] [place] [address]    FESS, USING COMPUTER-ASSISTED NAVIGATION N/A 4/9/2019    Performed by Adin Burks III, MD at [facility] OR [address] [place] [address]    FINE NEEDLE ASPIRATION (FNA)  of a cervical lymphadenopathy  1/29/2016    Performed by Joann Cifuentes DO at [facility] OR [address] [place] [address]    flex bronchoscopy with probable tissue biopsy N/A 7/31/2014    Performed by [facility] Diagnostic Provider at [facility] OR [address] [place] [address]    flexible bronchoscopy with tissue biopsy N/A 12/11/2014    [facility]    HYSTERECTOMY  04/2017    [facility]    INJECTION-VOCAL CORD Left 6/24/2014    Performed by Jared Whatley MD at [facility] OR [address] [place] [address]    [id number]-CATH to right chest and removal of portacath to left chests wall. Bilateral 6/24/2014    Performed by Zhen Dorado MD at [facility] OR [address] [place] [address]    LARYNX SURGERY  2016    LUNG TRANSPLANT Bilateral 6/12/14    MAXILLARY ANTROSTOMY  4/9/2019    Performed by Adin Burks III, MD at [facility] OR [address] [place] [address]    MYRINGOTOMY W/ TUBES Right 04/2017    MYRINGOTOMY WITH INSERTION OF PE TUBES Right 4/15/2017    Performed by Gary Null MD at [facility] OR [address] [place] [address]    PLACEMENT-TUBE-PEG  5/15/2014     [facility]    PORTACATH PLACEMENT Right     rt sc    REDO BILATERAL LUNG TRANSPLANT; CLAMSHELL Bilateral 6/18/2019    Performed by Jonathan Newby MD at [facility] OR [address] [place] [address]    REMOVAL-TUBE-PEG  5/15/2014    Performed [facility]    RHC for lung re-transplant N/A 1/22/2019    Performed by Laura Rachel MD at [facility] CATH LAB    ROBOTIC ASSISTED LAPAROSCOPIC HYSTERECTOMY N/A 4/25/2017    Performed by Deny Dias Jr., MD at [facility] OR    SALPINGECTOMY Bilateral 2015    [facility]    SALPINGECTOMY-LAPAROSCOPIC Bilateral 5/19/2015    Performed by Deny Dias Jr., MD at [facility] OR    SINUS SURGERY FUNCTIONAL ENDOSCOPIC WITH NAVIGATION Bilateral 4/3/2017    Performed by Cesar Olsen MD at [facility]    SINUS SURGERY FUNCTIONAL ENDOSCOPIC WITH NAVIGATION, 69395, 75473, 80042, 62107 Bilateral 2/5/2015    Performed by [facility]lsen [facility] at [facility]    SPHENOIDECTOMY Bilateral 4/9/2019    Performed by Adin Burks III, MD at [facility] OR [address] [place] [address]    THYROPLASTY - Medialization laryngoplasty, cricothyroid subluxation, arytenoid repositioning Left 8/2/2016    Performed by Gary Null MD at [facility] OR [facility] FLR    TRANSPLANT-LUNG Bilateral 6/11/2014    Performed by Adolfo Huston MD at [facility] OR [address] [place] [address]       Review of patient's allergies indicates:   Allergen Reactions    Albuterol Palpitations    Colistin Anaphylaxis    Vancomycin analogues      Infusion reaction that does not resolve with slowing  Pt. States she can tolerate it when given with 50 mg Benadryl and ran over 3 hours    Neupogen [filgrastim] Other (See Comments)     Ostealgia after five daily doses of 300 mcg.      Bactrim [sulfamethoxazole-trimethoprim] Hives    Ceftazidime Hives     Pt stated can tolerate cefapine not ceftazidime    Ceftazidime     Dronabinol Other (See Comments)     Mental changes/hallucinations    Haldol [haloperidol lactate] Other  (See Comments)     Seizure like activity    Nsaids (non-steroidal anti-inflammatory drug)      Cannot have due to lung transplant    Adhesive Rash     Cloth tape- please use tegaderm or paper tape    Aztreonam Rash    Ciprofloxacin Nausea And Vomiting     Projectile N/V, per patient.  Unwilling to retry therapy.       Medications:  Medications Prior to Admission   Medication Sig    calcium-vitamin D3 (OS-KATY 500 + D3) 500 mg(1,250mg) -200 unit per tablet Take 1 tablet by mouth 2 (two) times daily with meals.    dapsone 100 MG Tab Take 1 tablet (100 mg total) by mouth once daily.    diphenhydrAMINE (BENADRYL) 50 MG tablet Take 1 tablet (50 mg total) by mouth every 6 (six) hours as needed for Itching.    fexofenadine (ALLEGRA) 180 MG tablet Take 180 mg by mouth once daily.    fluticasone propionate (FLONASE) 50 mcg/actuation nasal spray INSERT 2 SPRAYS IN EACH NOSTRIL DAILY    folic acid (FOLVITE) 1 MG tablet Take 1 tablet (1,000 mcg total) by mouth once daily.    gabapentin (NEURONTIN) 300 MG capsule Take 1 capsule (300 mg total) by mouth 3 (three) times daily.    hydrocortisone 1 % cream Apply topically 2 (two) times daily.    inhalation spacing device ([employer]) USE AS DIRECTED    levalbuterol (XOPENEX) 1.25 mg/3 mL nebulizer solution Take 3 mLs (1.25 mg total) by nebulization every 8 (eight) hours as needed for Wheezing or Shortness of Breath. Rescue    lipase-protease-amylase (CREON) 36,000-114,000- 180,000 unit CpDR Take 4 capsules by mouth 3 (three) times daily with meals. Take 3 with snacks as needed. (Patient taking differently: Take 5 capsules by mouth 3 (three) times daily with meals. )    LORazepam (ATIVAN) 1 MG tablet Take 1 tablet (1 mg total) by mouth every 8 (eight) hours as needed for Anxiety.    magnesium oxide (MAG-OX) 400 mg (241.3 mg magnesium) tablet Take 1 tablet (400 mg total) by mouth 2 (two) times daily.    metoprolol tartrate (LOPRESSOR) 25 MG tablet Take 1 tablet (25 mg  total) by mouth 2 (two) times daily.    montelukast (SINGULAIR) 10 mg tablet Take 1 tablet (10 mg total) by mouth once daily.    morphine (MS CONTIN) 15 MG 12 hr tablet Take 1 tablet by mouth twice a day    multivit,min52-folic-vitK-cQ10 (AQUADEKS) 100-700-10 mcg-mcg-mg Cap cap 1 tab twice daily    mycophenolate (CELLCEPT) 250 mg Cap Take 1 capsule (250 mg total) by mouth 2 (two) times daily.    omeprazole (PRILOSEC) 40 MG capsule Take 1 capsule (40 mg total) by mouth once daily.    ondansetron (ZOFRAN) 8 MG tablet Take 1 tablet (8 mg total) by mouth every 12 (twelve) hours as needed for Nausea.    oxyCODONE (ROXICODONE) 5 MG immediate release tablet Take 1 tablet (5 mg total) by mouth every 6 (six) hours as needed.    polyethylene glycol (GLYCOLAX) 17 gram PwPk Take 17 g by mouth 2 (two) times daily.    predniSONE (DELTASONE) 5 MG tablet Take 3 tablets (15 mg total) by mouth once daily.    QUEtiapine (SEROQUEL) 25 MG Tab Take 1 tablet by mouth in morning and 2 tablets at bedtime    SITagliptin (JANUVIA) 100 MG Tab Take 1 tablet (100 mg total) by mouth once daily.    tacrolimus (PROGRAF) 1 MG Cap Take 1 capsule (1 mg total) by mouth every 12 (twelve) hours.    topiramate (TOPAMAX) 25 MG tablet Take 1 tablet (25 mg total) by mouth 2 (two) times daily    valGANciclovir (VALCYTE) 450 mg Tab Take 1 tablet (450 mg total) by mouth 2 (two) times daily.    venlafaxine (EFFEXOR-XR) 150 MG Cp24 Take 1 capsule (150 mg total) by mouth once daily.    venlafaxine (EFFEXOR-XR) 37.5 MG 24 hr capsule Take 1 capsule (37.5 mg total) by mouth once daily.    voriconazole (VFEND) 200 MG Tab Take 1 tablet (200 mg total) by mouth 2 (two) times daily.     Antibiotics (From admission, onward)    Start     Stop Route Frequency Ordered    07/24/19 2613  ceFEPIme injection 1 g      -- IV Every 8 hours (non-standard times) 07/24/19 1310    07/24/19 1000  vancomycin (VANCOCIN) 750 mg in dextrose 5 % 250 mL IVPB      -- IV Every  12 hours (non-standard times) 07/23/19 2003 07/24/19 0900  dapsone tablet 100 mg      -- Oral Daily 07/23/19 1956        Antifungals (From admission, onward)    Start     Stop Route Frequency Ordered    07/23/19 2100  voriconazole tablet 200 mg      -- Oral 2 times daily 07/23/19 1956        Antivirals (From admission, onward)        Stop Route Frequency     valGANciclovir      -- Oral 2 times daily           Immunization History   Administered Date(s) Administered    Cytomegalovirus Immune Globulin 06/13/2014    DTP 08/23/1994, 08/16/1995    Hepatitis A, Adult 04/04/2018, 01/22/2019    Hepatitis B 08/10/2000, 11/20/2000    Hepatitis B, Pediatric/Adolescent 11/28/2001    Influenza 09/12/2018    Influenza - Quadrivalent - PF 10/04/2017, 09/06/2018    Influenza - Trivalent (ADULT) 11/28/2001, 01/22/2002, 12/10/2002, 11/26/2003, 10/19/2004, 11/15/2005, 11/11/2006, 10/15/2007, 10/28/2009, 11/03/2010, 10/19/2011    Influenza - Trivalent - PF (ADULT) 10/14/2008, 10/22/2012, 11/02/2016    Influenza A (H1N1) 2009 Monovalent - IM - PF 12/01/2009    MMR 08/23/1994, 08/16/1995    Meningococcal Conjugate (MCV4P) 10/24/2008    OPV 08/23/1994, 08/16/1995, 08/10/2000    Pneumococcal Conjugate - 13 Valent 10/02/2017    Pneumococcal Conjugate - 7 Valent 01/22/2002    Pneumococcal Polysaccharide - 23 Valent 01/22/2002    Td (ADULT) 08/10/2000, 12/16/2010    Tdap 01/22/2019    Typhoid - ViCPs 04/06/2018       Family History     Problem Relation (Age of Onset)    Cancer Maternal Grandmother    Diabetes Maternal Grandfather    Drug abuse Maternal Grandfather    Hypertension Maternal Grandmother    Thrombocytopenia Maternal Grandfather        Social History     Socioeconomic History    Marital status: Single     Spouse name: Not on file    Number of children: Not on file    Years of education: Not on file    Highest education level: Not on file   Occupational History    Not on file   Social Needs    Financial  resource strain: Not on file    Food insecurity:     Worry: Not on file     Inability: Not on file    Transportation needs:     Medical: Not on file     Non-medical: Not on file   Tobacco Use    Smoking status: Never Smoker    Smokeless tobacco: Never Used   Substance and Sexual Activity    Alcohol use: No     Comment: Has not had an alcoholic drink in more than 2 months.    Drug use: No    Sexual activity: Yes     Partners: Male     Birth control/protection: See Surgical Hx     Comment: current partner since Oct 2016   Lifestyle    Physical activity:     Days per week: Not on file     Minutes per session: Not on file    Stress: Not on file   Relationships    Social connections:     Talks on phone: Not on file     Gets together: Not on file     Attends Hindu service: Not on file     Active member of club or organization: Not on file     Attends meetings of clubs or organizations: Not on file     Relationship status: Not on file   Other Topics Concern    Not on file   Social History Narrative    Not on file     Review of Systems   Constitutional: Negative for appetite change, chills and fever.   HENT: Negative for congestion.    Eyes: Negative for pain and itching.   Respiratory: Negative for cough, chest tightness and shortness of breath.    Cardiovascular: Negative for palpitations and leg swelling.   Gastrointestinal: Negative for abdominal pain and nausea.   Endocrine: Negative for polyphagia and polyuria.   Genitourinary: Negative for dysuria and frequency.   Musculoskeletal: Negative for back pain.   Neurological: Negative for numbness and headaches.   Psychiatric/Behavioral: Negative for agitation and behavioral problems.     Objective:     Vital Signs (Most Recent):  Temp: 98.3 °F (36.8 °C) (07/24/19 1628)  Pulse: 86 (07/24/19 1628)  Resp: 18 (07/24/19 1628)  BP: (!) 124/92 (07/24/19 1628)  SpO2: (!) 94 % (07/24/19 1628) Vital Signs (24h Range):  Temp:  [97.8 °F (36.6 °C)-99.1 °F (37.3 °C)]  98.3 °F (36.8 °C)  Pulse:  [] 86  Resp:  [16-18] 18  SpO2:  [92 %-96 %] 94 %  BP: (119-146)/(79-99) 124/92     Weight: 54.4 kg (119 lb 14.9 oz)  Body mass index is 23.55 kg/m².    Estimated Creatinine Clearance: 70.6 mL/min (based on SCr of 1 mg/dL).    Physical Exam   Constitutional: She is oriented to person, place, and time. She appears well-developed and well-nourished.   Neck: Normal range of motion. Neck supple.   Pulmonary/Chest: Effort normal.   Wound vac under right breast   Abdominal: Soft. Bowel sounds are normal.   Musculoskeletal: She exhibits no edema.   Right groin hypopigmented (femoral line) scar .   Neurological: She is alert and oriented to person, place, and time.   Skin: Skin is warm and dry.   Psychiatric: She has a normal mood and affect. Her behavior is normal.       Significant Labs:   Blood Culture:   Recent Labs   Lab 04/05/19  1400 04/05/19  1429   LABBLOO No growth after 5 days. No growth after 5 days.     BMP:   Recent Labs   Lab 07/24/19  0632   *      K 4.3      CO2 22*   BUN 20   CREATININE 1.0   CALCIUM 9.0   MG 1.5*     CBC:   Recent Labs   Lab 07/23/19 2225 07/24/19  0632   WBC 5.95 5.03   HGB 8.6* 8.7*   HCT 29.6* 30.1*    189     CMP:   Recent Labs   Lab 07/23/19  2225 07/24/19  0632    139   K 4.7 4.3    107   CO2 27 22*   * 122*   BUN 19 20   CREATININE 0.9 1.0   CALCIUM 9.0 9.0   PROT 6.4 6.0   ALBUMIN 2.8* 2.6*   BILITOT 0.2 0.2   ALKPHOS 174* 166*   AST 8* 11   ALT 10 10   ANIONGAP 7* 10   EGFRNONAA >60.0 >60.0     Respiratory Culture:   Recent Labs   Lab 04/05/19  1429 06/20/19  0808 06/23/19  1735 06/25/19  0900 07/05/19  1445   GSRESP <10 epithelial cells per low power field.  Many WBC's  Many Gram positive cocci  Rare Gram positive rods  Rare yeast <10 epithelial cells per low power field.  No WBC's  No organisms seen <10 epithelial cells per low power field.  Few WBC's  Rare Gram negative rods <10 epithelial  cells per low power field.  Many WBC's  No organisms seen Few WBC's  Few Gram positive rods  Few budding yeast   RESPIRATORYC METHICILLIN RESISTANT STAPHYLOCOCCUS AUREUS  Many    PSEUDOMONAS AERUGINOSA  Moderate   No S aureus isolated.  PSEUDOMONAS AERUGINOSA   Rare   No S aureus isolated.  PSEUDOMONAS AERUGINOSA   Few  Normal respiratory anibal also present   No S aureus isolated.  PSEUDOMONAS AERUGINOSA   Few   No S aureus isolated.  PSEUDOMONAS AERUGINOSA   Moderate  Normal respiratory anibal also present  *     Urine Culture:   Recent Labs   Lab 06/17/19  1753   LABURIN No significant growth     Urine Studies:   Recent Labs   Lab 06/17/19  1753   COLORU Yellow   APPEARANCEUA Clear   PHUR 6.0   SPECGRAV 1.015   PROTEINUA Negative   GLUCUA Negative   KETONESU Negative   BILIRUBINUA Negative   OCCULTUA Negative   NITRITE Negative   LEUKOCYTESUR Negative     Wound Culture:   Recent Labs   Lab 04/09/19  1401 06/18/19  0930 07/23/19  1926   LABAERO METHICILLIN RESISTANT STAPHYLOCOCCUS AUREUS  Moderate    PSEUDOMONAS AERUGINOSA  Few   BRANDI ALBICANS  Few  No other significant isolate   No growth     All pertinent labs within the past 24 hours have been reviewed.    Significant Imaging: I have reviewed all pertinent imaging results/findings within the past 24 hours.

## 2019-07-24 NOTE — PLAN OF CARE
07/24/19 1458   Discharge Assessment   Confirmed/corrected address and phone number on facesheet? Yes   Assessment information obtained from? Patient;Caregiver;Medical Record   Expected Length of Stay (days) 2   Communicated expected length of stay with patient/caregiver yes   Prior to hospitilization cognitive status: Alert/Oriented   Prior to hospitalization functional status: Assistive Equipment;Needs Assistance   Current cognitive status: Alert/Oriented   Current Functional Status: Needs Assistance;Assistive Equipment   Facility Arrived From: home for wound vac placement   Lives With spouse   Able to Return to Prior Arrangements yes   Is patient able to care for self after discharge? Yes   Who are your caregiver(s) and their phone number(s)? spouse Raj Castrejon [phone]   Patient's perception of discharge disposition home or selfcare   Readmission Within the Last 30 Days current reason for admission unrelated to previous admission   If yes, most recent facility name: [facility]   Patient currently being followed by outpatient case management? No   Patient currently receives home health services? Yes  (For infusion services and port supplies)   Patient currently receives any other outside agency services? No   Is it the patient/care giver preference to resume care with the current outside agency? No   Equipment Currently Used at Home wheelchair;walker, boaz   Do you have any problems affording any of your prescribed medications? No   Is the patient taking medications as prescribed? yes   Does the patient have transportation home? Yes   Transportation Anticipated family or friend will provide   Does the patient receive services at the [facility]? No   Discharge Plan A Home with family   Discharge Plan B Home with family;Home Health   DME Needed Upon Discharge  wound care supplies;medication pump  (wound vac and medication pump for IV ABX)   Patient/Family in Agreement with Plan yes   Does the patient  currently use HME? Yes   Does the patient receive outpatient dialysis? No   Are there any open cases? No   Readmission Questionnaire   At the time of your discharge, did someone talk to you about what your health problems were? Yes   At the time of discharge, did someone talk to you about what to watch out for regarding worsening of your health problem? Yes   At the time of discharge, did someone talk to you about what to do if you experienced worsening of your health problem? Yes   At the time of discharge, did someone talk to you about which medication to take when you left the hospital and which ones to stop taking? Yes   At the time of discharge, did someone talk to you about when and where to follow up with a doctor after you left the hospital? Yes   What do you believe caused you to be sick enough to be re-admitted? surgical wound dehiscence    How often do you need to have someone help you when you read instructions, pamphlets, or other written material from your doctor or pharmacy? Never   Do you have problems taking your medications as prescribed? No   Do you have any problems affording any of  your prescribed medications? No   Do you have problems obtaining/receiving your medications? No   Does the patient have transportation to healthcare appointments? Yes   Living Arrangements house   Does the patient have family/friends to help with healtcare needs after discharge? yes   Does your caregiver provide all the help you need? Yes   Are you currently feeling confused? No   Are you currently having problems thinking? No   Are you currently having memory problems? No   Have you felt down, depressed, or hopeless? 0   Have you felt little interest or pleasure in doing things? 0   In the last 7 days, my sleep quality was: good     Met with patient and spouse in room 13384 to discuss discharge planning. The patient is currently admitted under observation. 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 This was her second transplant. First transplant was in 2014. She has a h/o of CF. She lives with her spouse in [place], [place] whilel recovering from transplant. Her physical address is [address] [place] [id number]. Her emergency  is her spouse Raj Castrejon [phone]. The patient confirmed being established with [facility] and that she administers her meds and flushes via [facility] port herself and that [facility] only supplies the meds and flushes.  for Bud Clarke [phone] phone [phone] fax. ID consulted pending recs for IV ABX, dose, and duration. She will need IVP benadryl prior to vanc if ordered. Contacted the wound care clinic RN for potential wound vac dressing changes in clinic MWF for two weeks followed by CTS clinic f/u in two weeks. Discussed case with  Brittni Watts wound care RN for wound vac dressing changes will not be in clinic for two weeks and the covering wound care RN Sindy Reyes is in clinic two days/week and next available appt is in two weeks. It was suggested that if we are unable to obtain insurance auth and/or a HH agency to accept the patient d/t [facility] medicaid plan with limited HH benefits and contracted HH providers that are willing to accept the medicaid plan we will have to perform dressing changes in the CT clinic. Will continue to follow and help with discharge planning throughout admission. Discussed with the patient the goal is discharge tomorrow afternoon if post acute care coordination for wound vac delivery and HH orders with or without SN visits for dressing changes can be accomplished. Lung transplant team aware of admission.     Payor[place] [employer] [place] / Plan: [facility] [place] / Product Type: Managed Care Transplant /

## 2019-07-24 NOTE — TELEPHONE ENCOUNTER
Received message from LIANA Chaney, RN regarding patient's complaint about a possible wound dehiscence.  LIANA Chaney spoke to Dr. Alvarez and received orders to have patient follow up with CTS to be evaluated.  LIANA Chaney spoke to patient regarding care for the wound and we will contact CTS.      CTS contacted regarding an urgent appointment.  Spoke to nurse Leann Martin regarding an urgent appt with Dr. Newby or any other surgeons regarding patient's complaint.  Leann stated that their appts are completely booked and unable to have patient seen.  Leann then stated that she will contact Dr. Newby to see if he would be willing to see patient eventhough he was not scheduled for clinic.  Leann then called us back to let us know that Dr. Newby will see patient the next day on 7/23/19 at 3 pm in clinic.  Leann states that she spoke to the patient and appt was given.

## 2019-07-24 NOTE — PLAN OF CARE
07/24/19 1529   Post-Acute Status   Post-Acute Authorization Home Health/Hospice   Home Health/Hospice Status Awaiting Orders for HH  (Pending recs to place orders)   Discharge Delays (!) Consult Recommendations Completed  (ID consult recs to be completed for duration of IV ABX cefepime and vanc)     Pt admitted for surgical wound dehiscence of clam shell incision under right breast. ID consulted and recs pending for duration of IV ABX. Also of note pending home health agency acceptance for SN visits for wound vac dressing changed [facility] for two weeks with Medicaid plan and delivery of wound vac to exchange hospital wound vac for home use wound vac. SHAUN tomorrow.

## 2019-07-24 NOTE — PROGRESS NOTES
Pt to TSU via wheelchair as direct admit.  at side. No acute distress noted. VSS. R CW Port in place, accessed by patient. R wound with black sponge intact, will hook up to cont wound vac suction per orders. MD at bedside to see patient.  See flowsheet for full assessment

## 2019-07-24 NOTE — PT/OT/SLP PROGRESS
"Speech Language Pathology      Juanita Ashley Ibarra  MRN: [id number]    Orders received and chart reviewed. Pt well known to speech service and was closely followed during her most recent admission. Pt was recommended to continue nectar thickened liquids and soft solids. Pt was historically non-compliant with SLP recommendations and continued with thin liquids. SLP reviewed chart this AM and pt documented to eat and drink "popeyes and dr. Pepper" without difficulty. Will recommend continuing current diet of regular solids and thin liquids, with pt preference for meds crushed in puree. Should team remain concerned for swallowing would recommend a modified barium swallow study as an outpatient. SLP discussed with team directly and in agreement with discontinuing speech orders at this time and pt to continue current diet.  Should pt status change speech service would welcome new consult.     Aleida Gentile, ANA-SLP    "

## 2019-07-24 NOTE — ASSESSMENT & PLAN NOTE
This is 30 y/o woman w/a history of CF (c/b pancreatic insufficiency, sinus disease, MRSA/Pseudomonas colonization c/b numerous antibiotics allergies, and subsequent COPD/ESLD; s/p BOLT 6/12/2014, CMV D+/R-, simulect induction, on maintenance tacro/MMF/pred; c/b multiple episodes of MRSA/Pseudomonas pneumonia/sinusitis, C.glabrata early post-operative colonization 7/2014, A1 rejection 8/2014 s/p pulse SM, prior CMV reactivations, and subsequent grade 3 [facility]LOS EDUARDO relisted for transplant) who was admitted on 6/17/2019 for redo bilateral lung transplant (s/p BOLT 6/18/2019, CMV D-/R+, basiliximab induction, on maintenance tacro/MMF/pred; c/b  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 ID was consulted for recent fevers (on 7/5), SOB, and non-productive cough due to ongoing  Pseudomonas pneumonia (with increasing MDR pattern) and invasive aspergillosis (with positive BAL antigen; multiple risk factors). She remains tenuous but is showing improvement on zerbaxa/vori for  2wks (stop date: 7/24/2019); and voriconazole for at least 6wks for invasive aspergillosis with 2wks of concurrent micafungin as combination therapy (ensure adequate coverage of C.glabrata as well -- azole sensitivities pending on Candida isolate to facilitate cessation of micafungin)- presented with wound dehiscence since 07/20 with minimal yellowish clear discharge, pt was admitted had I&D at bed side - Two wound cultures were sent from these opened areas.  The poorly healing fat and subcutaneous tissue was sharply resected until well vascularized tissue was noted.  The would was cleaned again with betadine and a wound vacuum system was connected. She was started on Vancomycin and Cefepime.        Recommendation:    1- Follow up wound cultures to tailor Abx.  2- Agree with empiric Vancomycin and Cefepime for now.  3- wound care for right inguinal non-healing  scar.  4- follow up in ID clinic.

## 2019-07-24 NOTE — PLAN OF CARE
07/24/19 1620   Post-Acute Status   Post-Acute Authorization Home Health/Hospice   Home Health/Hospice Status Referrals Sent     SW sent the wound vac form to [facility].  SW requested it be delivered tomorrow as pt will dc tomorrow.  SW sent her H&P and fs to [facility] , [facility], nurses registry, [facility] , [facility], [facility], [facility], [facility], and [facility].  LOKI will send orders once written.  NP is waiting on ID recs.  LOKI spoke with Farhan at [facility] ([phone]).    Pamela Baldwin, LCSW x [phone]    Pamela Baldwin< LCSW x [phone]

## 2019-07-25 NOTE — PLAN OF CARE
07/25/19 1012   Post-Acute Status   Post-Acute Authorization HME;Home Health/Hospice;Medications   HME Status Pending Delivery Hospital   Home Health/Hospice Status Pending Clinical Review   Medication Status   (referral sent)         LOKI spoke with [facility] and the wound vac is out for delivery to the hospital.  Pt has been denied from [facility], Nurse's [facility], [facility], [facility], and [facility].  [facility], [facility], [facility], and [facility] are still reviewing.  LOKI sent the orders to the [facility] for review.  LOKI sent the referral to [employer], the pt's current infusion company.  LOKI will f/u as needed.    Pamela Baldwin, [facility] x [phone]

## 2019-07-25 NOTE — PLAN OF CARE
07/25/19 1142   Discharge Assessment   Assessment Type Discharge Planning Reassessment   Discharge Plan A Home with family;Home Health   DME Needed Upon Discharge  wound care supplies  (wound vac and supplies, medication pump/IV ABX)   Patient/Family in Agreement with Plan yes     SW sent referrals via [facility] for  services. Orders in chart. [facility]  has accepted the patient for SN visits to assist with wound vac dressing changes. [facility] is delivering wound vac to patients room today. LMTCB on Jake's vmail  for [facility] to confirm all meds are ready and will be delivered. Spoke with Lung Transplant Case Manager Rocio yesterday regarding the patients needs. Lung Transplant service is aware of the patients admission for wound vac placement and IV ABX. ID is consulted and following. The cheo was a direct admit from CTS clinic for wound vac placement. Spoke with Brittni Watts RN for wound vac dressing changes in clinic. Brittni Watts will not be in clinic for two weeks and Sindy Reyes RN is only in clinic twice a week and does not have availability for 2 weeks. The patient will follow up with CTS Dr. Newby in clinic in two weeks then referral will be sent to Brittni Watts to resume wound vac dressing changes if needed beyond two weeks. Spoke with Cierra in [facility] regarding the request for bilateral platform attachments for the front wheeled rolling walker that was delivered on previous admission and paid for out of pocket, It will not be covered by insurance. Each attachment will cost $70 for a total cost of $140. Will notify patient/caregiver of the out of pocket cost and if they would like the bilateral attachments anyway. Spoke with Roxanna De La Cruz RN for [facility] regarding discharge planning. The patient was receiving OP therapy at [facility] OP therapy clinic and will have to postpone therapy for two weeks while receiving home health. She will be able to resume therapy in  two weeks. Called the patients primary caregiver Raj Castrejon, per the patients request, this morning to provide update and projected discharge this afternoon once HH is arranged, [facility] delivers home wound vac, and Jake has returned call from [facility] to confirm everything is ready for discharge regarding home infusion services.

## 2019-07-25 NOTE — PLAN OF CARE
07/25/19 1559   Post-Acute Status   Post-Acute Authorization HME;Home Health/Hospice;Medications   HME Status Set-up Complete   Home Health/Hospice Status Set-up Complete   Medication Status Set-up Complete     Pt was accepted by [facility].  They will see her tomorrow.  Pt's wound vac was delivered to the bedside.  Pt's IV abx will be delivered to her house.    Pamela Baldwin, [facility] x [phone]

## 2019-07-25 NOTE — PLAN OF CARE
07/25/19 1646   Final Note   Assessment Type Final Discharge Note   Anticipated Discharge Disposition Home-Health   What phone number can be called within the next 1-3 days to see how you are doing after discharge? [phone]   Hospital Follow Up  Appt(s) scheduled? Yes   Discharge plans and expectations educations in teach back method with documentation complete? Yes     Met with patient and spouse in room 10446 to discuss discharge. Home Health is set up with [facility]. Someone form [facility] will contact the patient to set up first appt. The patient will have an OP bronchoscopy tomorrow morning. [facility] wound vas is in room. Discuss the DME for bilateral platform walker is not necessary d/t no sternal precautions for clam shell approach to transplant. No need for platform attachments as this would be an out of pocket expense. Spoke with Jake at [facility] and all meds are being prepared for delivery to the home. Cefepime dose scheduled for 5pm will be administered prior to her discharge. Explained th epaln to discharge with home health for SN visits for VS and wound vac dressing changes. Clinic staff will contact the patient/spouse to notify of f/u with Dr. Newby in 2 weeks. Explained the OP therapy will have to be postponed d/t home bound status with dehiscence of her surgical wound and the around the clock administration of IV ABX therapy. Explained this to the patient/spose and they are in agreement and understand the plan to resume OP therapy after two weeks of home health. Thd patient will be seen in clinic in two week and then be referred to Brittni Watts in the wound care clinic for wound vac dressing changes. It is at that time that the patient will be okay to return to OP therapy. She stated she had not started OP therapy from previous discharge. The patients spouse has to run one errand prior to discharge advised him to take care of the errand asa so he can return to be educated on the wound vac with the bedside  RN. Next dose for her Vanc is at 10 pm. Jake from [facility] confirmed all meds will be delivered to her home prior to the next IV ABX dose.     Future Appointments   Date Time Provider Department Center   8/5/2019 10:00 AM DIABETES DISCHARGE CLINIC [facility] SOLEDAD Arroyo

## 2019-07-25 NOTE — PLAN OF CARE
Problem: Adult Inpatient Plan of Care  Goal: Plan of Care Review  Outcome: Ongoing (interventions implemented as appropriate)  - Patient admitted 7/23/19 for dehiscence of transplant surgical incision (clamshell incision for 2nd bilateral lung transplant performed 6/18/19).  - Yesterday, patient had a Wound Vac placed to right lateral portion of incision where dehiscence occurred. No output in canister currently. Wound Vac seal maintained.  - IV antibiotics: Cefepime q8h; vancomycin q12h. Patient will have a vanc trough level checked this morning. IV Benadryl given prior to vanc per order due to allergy (hx of red man syndrome).  - Patient has a history of Pseudomonas  (most recently in bronchial wash from 7/5/19). Contact precautions maintained.  - Patient c/o incisional pain (oxycodone given) and headache (Tylenol given).  - Patient is able to ambulate to bathroom with 1 or 2 person assistance - mobility impairment due to pain and weakness.  - Per CTS provider notes, patient will be discharged today if ID agrees (signed off yesterday) and also if Lung Transplant agrees (Lung Transplant has not been consulted yet).  - Patient has an outpatient bronchoscopy scheduled in the OR for 7/26 at 0700.

## 2019-07-25 NOTE — ANESTHESIA PREPROCEDURE EVALUATION
[facility]  Anesthesia Pre-Operative Evaluation         Patient Name: Juanita Ibarra  YOB: 1990  MRN: [id number]    SUBJECTIVE:     Pre-operative evaluation for Procedure(s) (LRB):  flexible bronchoscopy with tissue biopsy CPT 56091  (N/A)     07/25/2019    Juanita Ibarra is a 29 y.o. 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 Further PMHx includes bronchilolitis obliterans syndrome, HTN, osteopenia, seizures. Patient has a history of Pseudomonas  (most recently in bronchial wash from 7/5/19). Patient admitted 7/23/19 for dehiscence of transplant surgical incision (clamshell incision for 2nd bilateral lung transplant performed 6/18/19). Currently on contact precautions for MRSA and .     Patient requesting no ketamine due to bad side effects in past. Also states has hx of itching post-operatively requiring benadryl in PACU.      Patient now presents for the above procedure(s).      LDA:        Port A Cath Single Lumen 06/24/14 1200 right subclavian (Active)   Accessed by: ANU Ibarra (patient) 7/23/2019  8:00 PM   Dressing Type Transparent;Gauze 7/24/2019  7:40 PM   Dressing Status Clean;Dry;Intact 7/24/2019  7:40 PM   Dressing Intervention Dressing reinforced 7/24/2019  7:40 PM   Dressing Change Due 07/28/19 7/24/2019  7:40 PM   Line Status Saline locked;Flushed;No blood return 7/24/2019  7:40 PM   Flush Performed Yes 7/24/2019  7:40 PM   Date to be Reflushed 07/25/19 7/24/2019  7:40 PM   Daily Line Review Performed 7/24/2019  7:40 PM   Type of Needle Nicole 7/24/2019  7:40 PM   Gauge 20 7/24/2019  7:40 PM   Needle Length 3/4 in 7/24/2019  7:40 PM   Needle Status Left in place 7/24/2019  7:40 PM   Needle Insertion Date 07/21/19 7/24/2019  7:40 PM   Needle Insertion Time 1800 7/12/2019  8:00 PM   Number of days: 1856       Prev airway:   6/21/19  Intubation: postinduction, laryngoscopy glidescope, Ej 3.  Endotracheal Tube: oral,  8.0 mm ID, cuffed (inflated to minimal occlusive pressure)  Attempts: 1, Grade I - full view of cords  Complicating Factors: none  Tube secured at 22 cm at the lips.  Findings post-intubation: bilateral breath sounds  Position Confirmation: auscultation    Drips:     None     Patient Active Problem List   Diagnosis    Cystic fibrosis with pulmonary manifestations    Sinusitis, chronic    Anxiety disorder    Other pain disorders related to psychological factors    Protein calorie malnutrition    CF related Pancreatic insufficiency    Chronic pain with opiate use    Allergy to multiple antibiotics    Dysphagia, oropharyngeal    Chronic visceral pain (pleura)    Transaminitis    Gallstones    Hyperglycemia    Adrenal cortical steroids causing adverse effect in therapeutic use    Immunosuppression    Complication of lung transplant    Dysphonia    Portacath in place    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    Prophylactic antibiotic    Complication of transplanted lung    Acute rejection of lung transplant    Debility    Muscle spasm of back    Anemia    Headache    Aftercare following organ transplant    Leukopenia    LRTI (lower respiratory tract infection)    SBO (small bowel obstruction)    Sterilization    Lung replaced by transplant    Fever    Cytomegalovirus (CMV) viremia    Hospital acquired PNA    Hyperkalemia, diminished renal excretion    Acute kidney injury    Other complications of lung transplant    S/P lung transplant    Pneumonia, organism unspecified(486)    Lymphadenopathy    Biliary colic    Unilateral complete vocal fold paralysis    Chronic ethmoidal sinusitis    Mastoiditis    ETD (Eustachian tube dysfunction), bilateral    Dysmenorrhea    Sinus infection    Hypertension    Pneumonia    Current chronic use of systemic steroids    TMJ arthralgia    Adverse effect of anabolic steroid    Opioid overdose    Acute kidney failure with lesion  of tubular necrosis    SOB (shortness of breath)    Splenomegaly    Staphylococcus aureus pneumonia    Acute otitis externa of left ear    Lung transplant status    Preoperative testing    Prophylactic immunotherapy    Acute blood loss anemia    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    Lung transplant status, bilateral    Candida glabrata infection    Impaired functional mobility and endurance    Aspergillus    Sternal wound dehiscence       Review of patient's allergies indicates:   Allergen Reactions    Albuterol Palpitations    Colistin Anaphylaxis    Vancomycin analogues      Infusion reaction that does not resolve with slowing  Pt. States she can tolerate it when given with 50 mg Benadryl and ran over 3 hours    Neupogen [filgrastim] Other (See Comments)     Ostealgia after five daily doses of 300 mcg.      Bactrim [sulfamethoxazole-trimethoprim] Hives    Ceftazidime Hives     Pt stated can tolerate cefapine not ceftazidime    Ceftazidime     Dronabinol Other (See Comments)     Mental changes/hallucinations    Haldol [haloperidol lactate] Other (See Comments)     Seizure like activity    Nsaids (non-steroidal anti-inflammatory drug)      Cannot have due to lung transplant    Adhesive Rash     Cloth tape- please use tegaderm or paper tape    Aztreonam Rash    Ciprofloxacin Nausea And Vomiting     Projectile N/V, per patient.  Unwilling to retry therapy.       Current Inpatient Medications:      No current facility-administered medications on file prior to encounter.      Current Outpatient Medications on File Prior to Encounter   Medication Sig Dispense Refill    calcium-vitamin D3 (OS-KATY 500 + D3) 500 mg(1,250mg) -200 unit per tablet Take 1 tablet by mouth 2 (two) times daily with meals. 60 tablet 11    dapsone 100 MG Tab Take 1 tablet (100 mg total) by mouth once daily. 30 tablet 11    diphenhydrAMINE (BENADRYL) 50 MG tablet Take 1 tablet (50 mg total) by  mouth every 6 (six) hours as needed for Itching. 1 tablet 0    fexofenadine (ALLEGRA) 180 MG tablet Take 180 mg by mouth once daily.      fluticasone propionate (FLONASE) 50 mcg/actuation nasal spray INSERT 2 SPRAYS IN EACH NOSTRIL DAILY 16 g 5    folic acid (FOLVITE) 1 MG tablet Take 1 tablet (1,000 mcg total) by mouth once daily. 30 tablet 11    gabapentin (NEURONTIN) 300 MG capsule Take 1 capsule (300 mg total) by mouth 3 (three) times daily. 90 capsule 11    hydrocortisone 1 % cream Apply topically 2 (two) times daily. 28.35 g 0    inhalation spacing device (Lending a Helping Hand) USE AS DIRECTED 1 Device 0    levalbuterol (XOPENEX) 1.25 mg/3 mL nebulizer solution Take 3 mLs (1.25 mg total) by nebulization every 8 (eight) hours as needed for Wheezing or Shortness of Breath. Rescue 72 mL 5    lipase-protease-amylase (CREON) 36,000-114,000- 180,000 unit CpDR Take 4 capsules by mouth 3 (three) times daily with meals. Take 3 with snacks as needed. (Patient taking differently: Take 5 capsules by mouth 3 (three) times daily with meals. ) 800 capsule 11    LORazepam (ATIVAN) 1 MG tablet Take 1 tablet (1 mg total) by mouth every 8 (eight) hours as needed for Anxiety.      magnesium oxide (MAG-OX) 400 mg (241.3 mg magnesium) tablet Take 1 tablet (400 mg total) by mouth 2 (two) times daily. 60 tablet 11    metoprolol tartrate (LOPRESSOR) 25 MG tablet Take 1 tablet (25 mg total) by mouth 2 (two) times daily. 60 tablet 11    montelukast (SINGULAIR) 10 mg tablet Take 1 tablet (10 mg total) by mouth once daily. 30 tablet 11    morphine (MS CONTIN) 15 MG 12 hr tablet Take 1 tablet by mouth twice a day 60 tablet 0    multivit,min52-folic-vitK-cQ10 (AQUADEKS) 100-700-10 mcg-mcg-mg Cap cap 1 tab twice daily 60 capsule 0    mycophenolate (CELLCEPT) 250 mg Cap Take 1 capsule (250 mg total) by mouth 2 (two) times daily. 120 capsule 11    omeprazole (PRILOSEC) 40 MG capsule Take 1 capsule (40 mg total) by mouth once daily. 30  capsule 11    ondansetron (ZOFRAN) 8 MG tablet Take 1 tablet (8 mg total) by mouth every 12 (twelve) hours as needed for Nausea. 30 tablet 11    oxyCODONE (ROXICODONE) 5 MG immediate release tablet Take 1 tablet (5 mg total) by mouth every 6 (six) hours as needed. 15 tablet 0    polyethylene glycol (GLYCOLAX) 17 gram PwPk Take 17 g by mouth 2 (two) times daily. 60 packet 11    predniSONE (DELTASONE) 5 MG tablet Take 3 tablets (15 mg total) by mouth once daily. 90 tablet 11    QUEtiapine (SEROQUEL) 25 MG Tab Take 1 tablet by mouth in morning and 2 tablets at bedtime 90 tablet 1    SITagliptin (JANUVIA) 100 MG Tab Take 1 tablet (100 mg total) by mouth once daily. 30 tablet 11    tacrolimus (PROGRAF) 1 MG Cap Take 1 capsule (1 mg total) by mouth every 12 (twelve) hours. 60 capsule 11    topiramate (TOPAMAX) 25 MG tablet Take 1 tablet (25 mg total) by mouth 2 (two) times daily 60 tablet 1    valGANciclovir (VALCYTE) 450 mg Tab Take 1 tablet (450 mg total) by mouth 2 (two) times daily. 60 tablet 11    venlafaxine (EFFEXOR-XR) 150 MG Cp24 Take 1 capsule (150 mg total) by mouth once daily. 30 capsule 1    venlafaxine (EFFEXOR-XR) 37.5 MG 24 hr capsule Take 1 capsule (37.5 mg total) by mouth once daily. 30 capsule 1    voriconazole (VFEND) 200 MG Tab Take 1 tablet (200 mg total) by mouth 2 (two) times daily. 60 tablet 5       Past Surgical History:   Procedure Laterality Date    ABDOMINAL SURGERY      peg tube     [id number] N/A 5/19/2015    Performed by [facility]y Dias Jr[facility] at [facility] OR    BIOPSY-BRONCHUS N/A 12/20/2016    Performed [facility] OR 2ND [facility]    Bronchoscopy N/A 7/5/2019    Performed by Francisca Morin DO at [facility] OR 2ND [facility]    BRONCHOSCOPY Bilateral 12/11/2018    Performed by [facility]isc[facility] at [facility] OR 2ND [facility]LR    BRONCHOSCOPY N/A 10/1/2018    Performed [facility] at [facility] OR 2ND [facility]    BRONCHOSCOPY Bilateral 12/20/2016     Performed by Jake [facility]varez MD at [facility] OR 2ND [facility]    BRONCHOSCOPY - flexible bronchoscopy with probable tissue biopsy CPT 45695 N/A 7/21/2015    Performed by Jake Alvarez MD at [facility] OR 2ND [facility]    BRONCHOSCOPY - flexible bronchoscopy with probable tissue biospy CPT 54866 N/A 10/20/2015    Performed by Jake Alvarez MD at [facility] OR 2ND [facility]LR    BRONCHOSCOPY - flexible bronchoscopy with tissue biopsy CPT 72420 N/A 9/29/2015    Performed by Jake Alvarez MD at [facility] OR 2ND [facility]LR    BRONCHOSCOPY - flexible bronchoscopy with tissue biopsy CPT 26420 N/A 5/26/2015    Performed by Jake Alvarez MD at [facility] OR 1ST FLR    BRONCHOSCOPY flexible bronchoscopy with tissue biopsy N/A 9/8/2014    Performed by Jake Alvarez MD at [facility] OR 2ND [facility]LR    BRONCHOSCOPY flexible with possible tissue biopsy N/A 8/8/2014    Performed by Jake Alvarez MD at [facility] OR 2ND [facility]    BRONCHOSCOPY, BAL, BIOPSIES N/A 3/20/2018    Performed by Jake Alvarez MD at [facility] OR 2ND [facility]LR    BRONCHOSCOPY-FIBEROPTIC N/A 1/29/2016    Performed by Joann Cifuentes DO at [facility] OR 2ND FLR    BRONCHOSCOPY; Bronchoscopy with BAL and transbronchial biopsies under general anesthesia N/A 5/29/2018    Performed by Jake Alvarez MD at [facility] OR 2ND FLR    CHOLECYSTECTOMY  2016    CHOLECYSTECTOMY-LAPAROSCOPIC N/A 7/22/2016    Performed by Joshua Goldberg, MD at [facility] OR 2ND FLR    COLONOSCOPY N/A 5/3/2019    Performed by Juancho Rich MD at [facility] ENDO (2ND FLR)    ENDOMETRIAL ABLATION  2015    K[facility]    ETHMOIDECTOMY Bilateral 4/9/2019    Performed by Adin Burks III, MD at [facility] OR 2ND FLR    FESS      FESS Bilateral 10/21/2013    Performed by Jared Whatley MD at [facility] OR 2ND FLR    FESS, USING COMPUTER-ASSISTED NAVIGATION N/A 4/9/2019    Performed by Adin Burks III, MD at [facility] OR 2ND FLR    FINE NEEDLE ASPIRATION (FNA)  of a cervical lymphadenopathy  1/29/2016    Performed by Joann Cifuentes DO at [facility] OR  2ND FLR    flex bronchoscopy with probable tissue biopsy N/A 7/31/2014    Performed by [facility] at [facility] OR 2ND FLR    flexible bronchoscopy with tissue biopsy N/A 12/11/2014    Performed by Jake Alvarez MD at [facility] OR 2ND FLR    HYSTERECTOMY  04/2017    [facility]    INJECTION-VOCAL CORD Left 6/24/2014    [facility]    [id number]-CATH to right chest and removal of portacath to left chests wall. Bilateral 6/24/2014    Performed by Zhen Dorado MD at [facility] OR 2ND [facility]    LARYNX SURGERY  2016    LUNG TRANSPLANT Bilateral 6/12/14    MAXILLARY ANTROSTOMY  4/9/2019    Performed by Adin Burks III, MD at [facility] OR [address] [place] [address]    MYRINGOTOMY W/ TUBES Right 04/2017    MYRINGOTOMY WITH INSERTION OF PE TUBES Right 4/15/2017    Performed by Gary Null MD at [facility] OR [address] [place] [address]    PLACEMENT-TUBE-PEG  5/15/2014    [facility]    PORTACATH PLACEMENT Right     rt sc    REDO BILATERAL LUNG TRANSPLANT; CLAMSHELL Bilateral 6/18/2019    Performed by Jonathan Newby MD at [facility] OR [address] [place] [address]    REMOVAL-TUBE-PEG  5/15/2014    Performed by David Moses MD at [facility] OR [address] [place] [address]    RHC for lung re-transplant N/A 1/22/2019    Performed by Laura Rachel MD at [facility] CATH LAB    ROBOTIC ASSISTED LAPAROSCOPIC HYSTERECTOMY N/A 4/25/2017    Performed by [facility]y Dias Jr[facility] MD at [facility] OR    SALPINGECTOMY Bilateral 2015    [facility]    SALPINGECTOMY-LAPAROSCOPIC Bilateral 5/19/2015    Performed by Deny Dias Jr., MD at [facility] OR    SINUS SURGERY FUNCTIONAL ENDOSCOPIC WITH NAVIGATION Bilateral 4/3/2017    Performed by Cesar Olsen MD at [facility] OR [address] [place] [address]    SINUS SURGERY FUNCTIONAL ENDOSCOPIC WITH NAVIGATION, 94853, 99038, 80345, 83666 Bilateral 2/5/2015    Performed by Cesar Olsen MD at [facility] OR [address] [place] [address]    SPHENOIDECTOMY Bilateral 4/9/2019    Performed by Adin Burks III, MD at [facility] OR [address] [place] [address]     THYROPLASTY - Medialization laryngoplasty, cricothyroid subluxation, arytenoid repositioning Left 8/2/2016    Performed by Gary Null MD at [facility] OR 2ND FLR    TRANSPLANT-LUNG Bilateral 6/11/2014    Performed by Adolfo Huston MD at [facility] OR 2ND FLR       Social History     Socioeconomic History    Marital status: Single     Spouse name: Not on file    Number of children: Not on file    Years of education: Not on file    Highest education level: Not on file   Occupational History    Not on file   Social Needs    Financial resource strain: Not on file    Food insecurity:     Worry: Not on file     Inability: Not on file    Transportation needs:     Medical: Not on file     Non-medical: Not on file   Tobacco Use    Smoking status: Never Smoker    Smokeless tobacco: Never Used   Substance and Sexual Activity    Alcohol use: No     Comment: Has not had an alcoholic drink in more than 2 months.    Drug use: No    Sexual activity: Yes     Partners: Male     Birth control/protection: See Surgical Hx     Comment: current partner since Oct 2016   Lifestyle    Physical activity:     Days per week: Not on file     Minutes per session: Not on file    Stress: Not on file   Relationships    Social connections:     Talks on phone: Not on file     Gets together: Not on file     Attends Mu-ism service: Not on file     Active member of club or organization: Not on file     Attends meetings of clubs or organizations: Not on file     Relationship status: Not on file   Other Topics Concern    Not on file   Social History Narrative    Not on file       OBJECTIVE:     Vital Signs Range (Last 24H):  Temp:  [36.8 °C (98.3 °F)-37.3 °C (99.1 °F)]   Pulse:  []   Resp:  [16-19]   BP: (123-136)/(87-97)   SpO2:  [93 %-96 %]       Significant Labs:  Lab Results   Component Value Date    WBC 5.29 07/25/2019    HGB 9.4 (L) 07/25/2019    HCT 32.0 (L) 07/25/2019     07/25/2019    CHOL 129 01/09/2019    TRIG  86 01/09/2019    HDL 48 01/09/2019    ALT 10 07/24/2019    AST 11 07/24/2019     07/25/2019    K 4.3 07/25/2019     07/25/2019    CREATININE 0.9 07/25/2019    BUN 20 07/25/2019    CO2 24 07/25/2019    TSH 3.858 01/09/2019    INR 0.9 06/20/2019    HGBA1C 4.7 01/09/2019       Diagnostic Studies:  1/22/19     · Estimated blood loss: none  · Filling pressures on the right and left are normal.  · No evidence of pulmonary HTN  · Normal CO /CI  · TPG 11  SVR 1802  · Compared to 2013 RHC (pre lung transplant) no significant changes    EKG:     Vent. Rate : 071 BPM     Atrial Rate : 071 BPM     P-R Int : 152 ms          QRS Dur : 082 ms      QT Int : 408 ms       P-R-T Axes : 069 070 057 degrees     QTc Int : 443 ms    Normal sinus rhythm  T wave abnormality, consider anterior ischemia  Abnormal ECG  When compared with ECG of 17-JUN-2019 23:57,  ST no longer elevated in Inferior leads  QT has lengthened  Confirmed by Luis HERNANDEZ MD, Vitaly CERVANTES ([id number]) on 6/24/2019 6:19:25 PM    2D ECHO:  12/05/18  · Normal left ventricular systolic function. The estimated ejection fraction is 55%  · Mild left atrial enlargement.  · Normal LV diastolic function.  · Normal right ventricular systolic function.  · Normal right atrial size.  · Mild tricuspid regurgitation.  · The estimated PA systolic pressure is 26mm Hg  · Normal central venous pressure (3 mm Hg).       ASSESSMENT/PLAN:                                                                                                                  07/25/2019  Juanita Ibarra is a 29 y.o., female.    Anesthesia Evaluation    I have reviewed the Patient Summary Reports.    I have reviewed the Nursing Notes.   I have reviewed the Medications.   Prednisone    Review of Systems  Anesthesia Hx:  No problems with previous Anesthesia Denies Hx of Anesthetic complications  History of prior surgery of interest to airway management or planning: lung surgery. Previous anesthesia:  General Denies Family Hx of Anesthesia complications.   Denies Personal Hx of Anesthesia complications.   Social:  Non-Smoker, No Alcohol Use    Hematology/Oncology:     Oncology Normal     EENT/Dental:EENT/Dental Normal   Cardiovascular:   Hypertension    Pulmonary:   Pneumonia Shortness of breath S/p lung transplant    Renal/:  Renal/ Normal  Denies Chronic Renal Disease.     Hepatic/GI:  Hepatic/GI Normal    Neurological:   Headaches Seizures    Endocrine:   Denies Diabetes.    Psych:   Psychiatric History          Physical Exam  General:  Well nourished    Airway/Jaw/Neck:  Airway Findings: Mouth Opening: Normal Tongue: Normal  General Airway Assessment: Adult  Mallampati: I  TM Distance: Normal, at least 6 cm  Jaw/Neck Findings:  Neck ROM: Normal ROM     Eyes/Ears/Nose:  EYES/EARS/NOSE FINDINGS: Normal   Dental:  Dental Findings: In tact    Chest/Lungs:  Chest/Lungs Findings: Clear to auscultation     Heart/Vascular:  Heart Findings: Rate: Normal     Abdomen:  Abdomen Findings: Normal    Musculoskeletal:  Musculoskeletal Findings: Normal   Skin:  Skin Findings: Normal    Mental Status:  Mental Status Findings:  Cooperative, Lethargic         Anesthesia Plan  Type of Anesthesia, risks & benefits discussed:  Anesthesia Type:  general, MAC  Patient's Preference:   Intra-op Monitoring Plan: standard ASA monitors  Intra-op Monitoring Plan Comments:   Post Op Pain Control Plan: IV/PO Opioids PRN, per primary service following discharge from PACU and multimodal analgesia  Post Op Pain Control Plan Comments:   Induction:   IV  Beta Blocker:  Patient is on a Beta-Blocker and has received one dose within the past 24 hours (No further documentation required).       Informed Consent: Patient understands risks and agrees with Anesthesia plan.  Questions answered. Anesthesia consent signed with patient.  ASA Score: 3     Day of Surgery Review of History & Physical:    H&P update referred to the surgeon.         Ready For  Surgery From Anesthesia Perspective.

## 2019-07-25 NOTE — NURSING
Patient discharged home as ordered. VSS. Dose of Cefepime administered as ordered. Patient transferred to home wound vac. Patient and spouse educated on discharge handout and upcoming appointments including bronch tomorrow morning. Patient and spouse verbalized understanding and denied any further questions. Patient and spouse educated on use of wound vac. Patient left unit via wheelchair accompanied by spouse. Patient left with all personal belongings, wound vac supplies, and discharge handout. Home antibiotics to be delivered to home. Visi monitor and iPad placed in proper areas.

## 2019-07-26 NOTE — TELEPHONE ENCOUNTER
Received a call from the patient regarding her home IV antibiotic regimen.  Upon discharge from the hospital yesterday, her CTS team ordered cefepime and vancomycin therapy for home.  Per patient, she noted in her MyChart account that her vancomycin trough = 33.4 yesterday. Reviewed patient's medical record and verified that the trough level was timed and drawn correctly.  Due to this elevated trough level, she did not administer her evening vancomycin dose last night and had yet to administer the morning dose as she was just arriving home following a bronch[facility] done [facility] earlier today.  Also, the vancomycin supply she was given by her infusion provider Gov[facility] was equipped in a system to infuse the medication over 1 hour.  With vancomycin therapy in the past, the patient had infused this medication over a minimum of 2.5 hours to decrease the risk of side effects.  The patient added that she has been unable to contact her CTS provider.  She asked if Dr. Alvarez would be willing to assist since he has prescribed IV antibiotics for her in the past.  Informed patient that I will update Dr. Alvarez and call her back with his recommendations.  Notified Dr. Alvarez of patient situation.  Received an order from him to update the patient's vancomycin therapy based on recommendations from lung transplant pharmacist Yasmani Rain, [place].  Also received orders to change the infusion duration to 2.5 hours for each vancomycin dose, continue premedication of diphenhydramine 25 mg IV before each vancomycin dose, and schedule labs (CBC, CMP, vancomycin trough) on Monday, 7/29/19.    Contacted Yasmani Rain PharmD to discuss the vancomycin regimen.  He also verified that the vancomycin trough was drawn correctly.  He added that this trough level likely reflected the vancomycin loading dose of 1500 mg x 1 that was given before the initiation of 750 mg IV every 12 hours.  He recommended holding the morning vancomycin  dose this morning and resuming vancomycin at 750 mg IV every 12 hours at 2200 tonight.  He agreed with a vancomycin trough level being drawn before the morning dose on 7/29/19.    Called patient to review the plan of care.  Instructed her to hold her vancomycin dose this morning and to resume vancomycin 750 mg IV every 12 hours starting at 2200 tonight.  Explained that updated orders re: a 2.5 hours infusion duration will be sent to [facility] to ensure she has the correct equipment to self-administer the antibiotic accordingly.  She agreed to report to [facility] at ~0930 on Monday, 7/29/19 to have labs drawn.  Asked her to infuse her morning vancomycin dose after her blood is drawn that day.  The patient repeated all instructions back to me correctly and denied having any questions.

## 2019-07-26 NOTE — ADDENDUM NOTE
Addendum  created 07/26/19 0833 by Panchito Ralph MD    Order list changed, Order sets accessed

## 2019-07-26 NOTE — HOSPITAL COURSE
Pt is a 30 y/o female s/p LUT with right thoracotomy incision site was dehisced in a few places. Pt is very high risk for infection and poor wound healing.  Antibiotics were initiated and Transplant ID was consulted for abx therapy recommendations. The lung transplant team was updated on events and agreed with plan. On day 2, the patient was ready for discharge to home. At the time of discharge, the patient was ambulating unassisted. Pain was well controlled with oral analgesics and the patient was tolerating the diet. Pt received her wound vac at bedside and home health was set up for wound care and IV abc therapy.      MOBILITY AND ACTIVITY: As tolerated. Patient may shower.    DIET: An regular diet      WOUND CARE INSTRUCTIONS: Check for redness, swelling and drainage around the  incision or wound. Patient is to call for any obvious bleeding, drainage, pus from the wound, unusual problems or difficulties or temperature of greater than 101 degrees.     FOLLOWUP: Follow up with Dr. Newby in approximately 2 weeks.    DISCHARGE CONDITION: At the time of discharge, the patient was in sinus rhythm and afebrile with stable vital signs.

## 2019-07-26 NOTE — HPI
Pt is a 28 y/o female s/p LUT with right thoracotomy incision site was dehisced in a few places.  No obvious purulent discharge was noted, but she is very high risk for infection and poor wound healing.  The patient was prepped with betadine, the dehisced sites were easily opened with blunt dissection (confirming poor wound healing) and connected to create a single 10 cm open incision site.  Two wound cultures were sent from these areas.  The poorly healing fat and subcutaneous tissue was sharply resected until well vascularized tissue was noted.  The would was cleaned again with betadine and a wound vacuum system was connected.  She tolerated the procedure well and noted no pain.  Antibiotics were initiated.

## 2019-07-26 NOTE — INTERVAL H&P NOTE
The patient has been examined and the H&P has been reviewed:    I concur with the findings and no changes have occurred since H&P was written. Developed right thoracotomy dehiscence and had wound vac placed.    Anesthesia/Surgery risks, benefits and alternative options discussed and understood by patient/family.          Active Hospital Problems    Diagnosis  POA    Lung replaced by transplant [Z94.2]  Not Applicable      Resolved Hospital Problems   No resolved problems to display.

## 2019-07-26 NOTE — ANESTHESIA POSTPROCEDURE EVALUATION
Anesthesia Post Evaluation    Patient: Juanita Ibarra    Procedure(s) Performed: Procedure(s) (LRB):  flexible bronchoscopy with tissue biopsy CPT 28337  (N/A)    Final Anesthesia Type: general  Patient location during evaluation: PACU  Patient participation: Yes- Able to Participate  Level of consciousness: awake and alert and oriented  Post-procedure vital signs: reviewed and stable  Pain management: adequate  Airway patency: patent  PONV status at discharge: No PONV  Anesthetic complications: no      Cardiovascular status: blood pressure returned to baseline  Respiratory status: unassisted, room air and spontaneous ventilation  Hydration status: euvolemic  Follow-up not needed.          Vitals Value Taken Time   BP 93/58 7/26/2019  8:16 AM   Temp 36.8 °C (98.2 °F) 7/26/2019  7:05 AM   Pulse 85 7/26/2019  8:16 AM   Resp 21 7/26/2019  8:16 AM   SpO2 99 % 7/26/2019  8:16 AM   Vitals shown include unvalidated device data.      No case tracking events are documented in the log.      Pain/Richard Score: Pain Rating Prior to Med Admin: 8 (7/25/2019  4:38 PM)  Pain Rating Post Med Admin: 5 (7/25/2019  1:31 PM)

## 2019-07-26 NOTE — TRANSFER OF CARE
Anesthesia Transfer of Care Note    Patient: Juanita Ibarra    Procedure(s) Performed: Procedure(s) (LRB):  flexible bronchoscopy with tissue biopsy CPT 87488  (N/A)    Patient location: PACU    Anesthesia Type: general    Transport from OR: Transported from OR on 6-10 L/min O2 by face mask with adequate spontaneous ventilation    Post pain: adequate analgesia    Post assessment: no apparent anesthetic complications    Post vital signs: stable    Level of consciousness: awake, responds to stimulation and alert    Nausea/Vomiting: no nausea/vomiting    Complications: none          Last vitals:   Visit Vitals  /78 (BP Location: Right arm, Patient Position: Lying)   Pulse 104   Temp 36.8 °C (98.2 °F) (Oral)   Resp 18   Ht 5' (1.524 m)   Wt 54.4 kg (120 lb)   LMP 10/02/2016 (LMP Unknown)   SpO2 97%   Breastfeeding? No   BMI 23.44 kg/m²

## 2019-07-26 NOTE — DISCHARGE SUMMARY
[facility]  Cardiothoracic Surgery  Discharge Summary      Patient Name: Juanita Ibarra  MRN: [id number]  Admission Date: 7/23/2019  Hospital Length of Stay: 2 days  Discharge Date and Time:  07/25/2019 2:00 PM  Attending Physician:Dr. Newby   Discharging Provider: Aura Francis NP  Primary Care Provider: Primary Doctor No    HPI:   Pt is a 30 y/o female s/p LUT with right thoracotomy incision site was dehisced in a few places.  No obvious purulent discharge was noted, but she is very high risk for infection and poor wound healing.  The patient was prepped with betadine, the dehisced sites were easily opened with blunt dissection (confirming poor wound healing) and connected to create a single 10 cm open incision site.  Two wound cultures were sent from these areas.  The poorly healing fat and subcutaneous tissue was sharply resected until well vascularized tissue was noted.  The would was cleaned again with betadine and a wound vacuum system was connected.  She tolerated the procedure well and noted no pain.  Antibiotics were initiated.      Indwelling Lines/Drains at time of discharge:   Lines/Drains/Airways     Central Venous Catheter Line                 Port A Cath Single Lumen 06/24/14 1200 right subclavian 1857 days          Pressure Ulcer                 Negative Pressure Wound Therapy  2 days              Hospital Course: Pt is a 30 y/o female s/p LUT with right thoracotomy incision site was dehisced in a few places. Pt is very high risk for infection and poor wound healing.  Antibiotics were initiated and Transplant ID was consulted for abx therapy recommendations. The lung transplant team was updated on events and agreed with plan. On day 2, the patient was ready for discharge to home. At the time of discharge, the patient was ambulating unassisted. Pain was well controlled with oral analgesics and the patient was tolerating the diet. Pt received her wound vac at bedside and  home health was set up for wound care and IV abx therapy. Pt has follow up with ID in 2 weeks along with labs weekly.      MOBILITY AND ACTIVITY: As tolerated. Patient may shower.    DIET: An regular diet      WOUND CARE INSTRUCTIONS: Check for redness, swelling and drainage around the  incision or wound. Patient is to call for any obvious bleeding, drainage, pus from the wound, unusual problems or difficulties or temperature of greater than 101 degrees.     FOLLOWUP: Follow up with Dr. Newby in approximately 2 weeks.    DISCHARGE CONDITION: At the time of discharge, the patient was in sinus rhythm and afebrile with stable vital signs.      Consults (From admission, onward)        Status Ordering Provider     Inpatient consult to Infectious Diseases  Once     Provider:  (Not yet assigned)    INDY Wei        Pending Diagnostic Studies:     None          Final Active Diagnoses:    Diagnosis Date Noted POA    PRINCIPAL PROBLEM:  Sternal wound dehiscence [T81.32XA] 07/23/2019 Yes      Problems Resolved During this Admission:      Discharged Condition: stable    Disposition: Home or Self Care    Follow Up:  Follow-up Information     [facility].    Specialties:  DME Provider, Home Health Services  Why:  Home Health: The nurse will see the patient tomorrow.  Contact information:  [address] [place] [address]  [phone]             [facility].    Specialty:  Infusion Provider  Why:  Infusion: They will provide the IV antibiotics.  Contact information:  [address]  [address]  [facility] [address]  [phone]             [facility].    Specialty:  DME Provider  Why:  DME: They will deliver the wound vac to the hospital prior to discharge.  Contact information:  [address] [facility]  [place] [address]  [phone]             Jonathan Newby MD In 2 weeks.    Specialty:  Cardiothoracic Surgery  Contact information:  [address]  "[facility] [address]  [phone]                 Patient Instructions:      WALKER FOR HOME USE   Order Comments: The patient has a mid sternal incision from recent lung transplant and can not ambulate without a walker, she has readmitted with sternal wound dehiscence and will need a platform attachment for her walker for discharge. Sternal precautions do not allow for ambution with a standard walker without Please call Abbey Marie RN case manager for CTS with cost if insurance will not cover. Thank you [phone]     Order Specific Question Answer Comments   Type of Walker: Adult (5'4"-6'6") no walker needed on need platofrm attachment to previous walker Rx   With wheels? No    Height: 4' 11.84" (1.52 m)    Weight: 54.4 kg (119 lb 14.9 oz)    Length of need (1-99 months): 99    Platform attachment: Bilateral    Does patient have medical equipment at home? wheelchair    Does patient have medical equipment at home? walker, boaz    Please check all that apply: Patient's condition impairs ambulation.    Please check all that apply: Patient is unable to safely ambulate without equipment.    Vendor: Other (use comments) Pt owns, 07/2019   Expected Date of Delivery: 7/25/2019      Ambulatory referral to Home Health   Referral Priority: Routine Referral Type: Home Health   Referral Reason: Specialty Services Required   Requested Specialty: Home Health Services   Number of Visits Requested: 1       Medications:  Reconciled Home Medications:      Medication List      CHANGE how you take these medications    CREON 36,000-114,000- 180,000 unit Cpdr  Generic drug:  lipase-protease-amylase  Take 4 capsules by mouth 3 (three) times daily with meals. Take 3 with snacks as needed.  What changed:  how much to take        CONTINUE taking these medications    dapsone 100 MG Tab  Take 1 tablet (100 mg total) by mouth once daily.     diphenhydrAMINE 50 MG tablet  Commonly known as:  BENADRYL  Take 1 tablet (50 mg total) by mouth every 6 " (six) hours as needed for Itching.     fexofenadine 180 MG tablet  Commonly known as:  ALLEGRA  Take 180 mg by mouth once daily.     fluticasone propionate 50 mcg/actuation nasal spray  Commonly known as:  FLONASE  INSERT 2 SPRAYS IN EACH NOSTRIL DAILY     folic acid 1 MG tablet  Commonly known as:  FOLVITE  Take 1 tablet (1,000 mcg total) by mouth once daily.     gabapentin 300 MG capsule  Commonly known as:  NEURONTIN  Take 1 capsule (300 mg total) by mouth 3 (three) times daily.     hydrocortisone 1 % cream  Apply topically 2 (two) times daily.     inhalation spacing device  Commonly known as:  PROCHAMBER  USE AS DIRECTED     JANUVIA 100 MG Tab  Generic drug:  SITagliptin  Take 1 tablet (100 mg total) by mouth once daily.     levalbuterol 1.25 mg/3 mL nebulizer solution  Commonly known as:  XOPENEX  Take 3 mLs (1.25 mg total) by nebulization every 8 (eight) hours as needed for Wheezing or Shortness of Breath. Rescue     LORazepam 1 MG tablet  Commonly known as:  ATIVAN  Take 1 tablet (1 mg total) by mouth every 8 (eight) hours as needed for Anxiety.     magnesium oxide 400 mg (241.3 mg magnesium) tablet  Commonly known as:  MAG-OX  Take 1 tablet (400 mg total) by mouth 2 (two) times daily.     metoprolol tartrate 25 MG tablet  Commonly known as:  LOPRESSOR  Take 1 tablet (25 mg total) by mouth 2 (two) times daily.     montelukast 10 mg tablet  Commonly known as:  SINGULAIR  Take 1 tablet (10 mg total) by mouth once daily.     morphine 15 MG 12 hr tablet  Commonly known as:  MS CONTIN  Take 1 tablet by mouth twice a day     multivit,min52-folic-vitK-cQ10 100-700-10 mcg-mcg-mg Cap cap  Commonly known as:  AQUADEKS  1 tab twice daily     mycophenolate 250 mg Cap  Commonly known as:  CELLCEPT  Take 1 capsule (250 mg total) by mouth 2 (two) times daily.     omeprazole 40 MG capsule  Commonly known as:  PRILOSEC  Take 1 capsule (40 mg total) by mouth once daily.     ondansetron 8 MG tablet  Commonly known as:   ZOFRAN  Take 1 tablet (8 mg total) by mouth every 12 (twelve) hours as needed for Nausea.     oxyCODONE 5 MG immediate release tablet  Commonly known as:  ROXICODONE  Take 1 tablet (5 mg total) by mouth every 6 (six) hours as needed.     OYSTER SHELL CALCIUM-VIT D3 500 mg(1,250mg) -200 unit per tablet  Generic drug:  calcium-vitamin D3  Take 1 tablet by mouth 2 (two) times daily with meals.     polyethylene glycol 17 gram Pwpk  Commonly known as:  GLYCOLAX  Take 17 g by mouth 2 (two) times daily.     predniSONE 5 MG tablet  Commonly known as:  DELTASONE  Take 3 tablets (15 mg total) by mouth once daily.     QUEtiapine 25 MG Tab  Commonly known as:  SEROQUEL  Take 1 tablet by mouth in morning and 2 tablets at bedtime     tacrolimus 1 MG Cap  Commonly known as:  PROGRAF  Take 1 capsule (1 mg total) by mouth every 12 (twelve) hours.     topiramate 25 MG tablet  Commonly known as:  TOPAMAX  Take 1 tablet (25 mg total) by mouth 2 (two) times daily     valGANciclovir 450 mg Tab  Commonly known as:  VALCYTE  Take 1 tablet (450 mg total) by mouth 2 (two) times daily.     * venlafaxine 37.5 MG 24 hr capsule  Commonly known as:  EFFEXOR-XR  Take 1 capsule (37.5 mg total) by mouth once daily.     * venlafaxine 150 MG Cp24  Commonly known as:  EFFEXOR-XR  Take 1 capsule (150 mg total) by mouth once daily.     voriconazole 200 MG Tab  Commonly known as:  VFEND  Take 1 tablet (200 mg total) by mouth 2 (two) times daily.         * This list has 2 medication(s) that are the same as other medications prescribed for you. Read the directions carefully, and ask your doctor or other care provider to review them with you.              Time spent on the discharge of patient: 35 minutes    Aura Francis NP  Cardiothoracic Surgery  [facility]

## 2019-07-26 NOTE — TELEPHONE ENCOUNTER
New orders received for Vanc change. LOKI contacted Jake with [facility] (Ph [phone]/fx [phone]) where pt's current infusions are ordered through. Jake states will changed orders and follow up with pt. LOKI remains available.

## 2019-07-26 NOTE — DISCHARGE SUMMARY
[facility]  Lung Transplant  Discharge Summary      Patient Name: Juanita Ibarra  MRN: [id number]  Admission Date: 7/26/2019  Hospital Length of Stay: 0 days  Discharge Date and Time: 07/26/2019 8:07 AM  Attending Physician: Jake Alvarez MD   Discharging Provider: Jake Alvarez MD  Primary Care Provider: Primary Doctor No     HPI: Ms. Ibarra was admitted for surveillance bronchoscopy.    Procedure(s) (LRB):  flexible bronchoscopy with tissue biopsy CPT 09080  (N/A)     Hospital Course: Bronchoscopy was performed without complications.        Significant Diagnostic Studies: Bronchoscopy: see separate report    Pending Diagnostic Studies:     Procedure Component Value Units Date/Time    Aspergillus Antigen, BAL [[id number]] Collected:  07/26/19 0752    Order Status:  Sent Lab Status:  In process Updated:  07/26/19 0802    Specimen:  Body Fluid from Bronchial Alveolar Lavage (BAL)     Cytomegalovirus by Quantitative PCR, BAL [[id number]] Collected:  07/26/19 0751    Order Status:  Sent Lab Status:  In process Updated:  07/26/19 0802    Specimen:  Bronchial Alveolar Lavage (BAL)     RESPIRATORY PATHOGENS PANEL (TEM-PCR) Bronchial Alveolar Lavage [[id number]] Collected:  07/26/19 0751    Order Status:  Sent Lab Status:  In process Updated:  07/26/19 0802    WBC & Diff,Body Fluid Bronchial Wash (BAL) [[id number]] Collected:  07/26/19 0751    Order Status:  Sent Lab Status:  In process Updated:  07/26/19 0802    Specimen:  Body Fluid     X-Ray Chest AP Single View [[id number]]     Order Status:  Sent Lab Status:  No result         Final Active Diagnoses:    Diagnosis Date Noted POA    PRINCIPAL PROBLEM:  Lung replaced by transplant [Z94.2] 05/26/2015 Not Applicable      Problems Resolved During this Admission:      Discharged Condition: good    Disposition: Home or Self Care    Medications:  Reconciled Home Medications:      Medication List      CHANGE how you take these medications     CREON 36,000-114,000- 180,000 unit Cpdr  Generic drug:  lipase-protease-amylase  Take 4 capsules by mouth 3 (three) times daily with meals. Take 3 with snacks as needed.  What changed:  how much to take        CONTINUE taking these medications    dapsone 100 MG Tab  Take 1 tablet (100 mg total) by mouth once daily.     diphenhydrAMINE 50 MG tablet  Commonly known as:  BENADRYL  Take 1 tablet (50 mg total) by mouth every 6 (six) hours as needed for Itching.     fexofenadine 180 MG tablet  Commonly known as:  ALLEGRA  Take 180 mg by mouth once daily.     fluticasone propionate 50 mcg/actuation nasal spray  Commonly known as:  FLONASE  INSERT 2 SPRAYS IN EACH NOSTRIL DAILY     folic acid 1 MG tablet  Commonly known as:  FOLVITE  Take 1 tablet (1,000 mcg total) by mouth once daily.     gabapentin 300 MG capsule  Commonly known as:  NEURONTIN  Take 1 capsule (300 mg total) by mouth 3 (three) times daily.     hydrocortisone 1 % cream  Apply topically 2 (two) times daily.     inhalation spacing device  Commonly known as:  PROCHAMBER  USE AS DIRECTED     JANUVIA 100 MG Tab  Generic drug:  SITagliptin  Take 1 tablet (100 mg total) by mouth once daily.     levalbuterol 1.25 mg/3 mL nebulizer solution  Commonly known as:  XOPENEX  Take 3 mLs (1.25 mg total) by nebulization every 8 (eight) hours as needed for Wheezing or Shortness of Breath. Rescue     LORazepam 1 MG tablet  Commonly known as:  ATIVAN  Take 1 tablet (1 mg total) by mouth every 8 (eight) hours as needed for Anxiety.     magnesium oxide 400 mg (241.3 mg magnesium) tablet  Commonly known as:  MAG-OX  Take 1 tablet (400 mg total) by mouth 2 (two) times daily.     metoprolol tartrate 25 MG tablet  Commonly known as:  LOPRESSOR  Take 1 tablet (25 mg total) by mouth 2 (two) times daily.     montelukast 10 mg tablet  Commonly known as:  SINGULAIR  Take 1 tablet (10 mg total) by mouth once daily.     morphine 15 MG 12 hr tablet  Commonly known as:  MS CONTIN  Take 1  tablet by mouth twice a day     multivit,min52-folic-vitK-cQ10 100-700-10 mcg-mcg-mg Cap cap  Commonly known as:  AQUADEKS  1 tab twice daily     mycophenolate 250 mg Cap  Commonly known as:  CELLCEPT  Take 1 capsule (250 mg total) by mouth 2 (two) times daily.     omeprazole 40 MG capsule  Commonly known as:  PRILOSEC  Take 1 capsule (40 mg total) by mouth once daily.     ondansetron 8 MG tablet  Commonly known as:  ZOFRAN  Take 1 tablet (8 mg total) by mouth every 12 (twelve) hours as needed for Nausea.     oxyCODONE 5 MG immediate release tablet  Commonly known as:  ROXICODONE  Take 1 tablet (5 mg total) by mouth every 6 (six) hours as needed.     OYSTER SHELL CALCIUM-VIT D3 500 mg(1,250mg) -200 unit per tablet  Generic drug:  calcium-vitamin D3  Take 1 tablet by mouth 2 (two) times daily with meals.     polyethylene glycol 17 gram Pwpk  Commonly known as:  GLYCOLAX  Take 17 g by mouth 2 (two) times daily.     predniSONE 5 MG tablet  Commonly known as:  DELTASONE  Take 3 tablets (15 mg total) by mouth once daily.     QUEtiapine 25 MG Tab  Commonly known as:  SEROQUEL  Take 1 tablet by mouth in morning and 2 tablets at bedtime     tacrolimus 1 MG Cap  Commonly known as:  PROGRAF  Take 1 capsule (1 mg total) by mouth every 12 (twelve) hours.     topiramate 25 MG tablet  Commonly known as:  TOPAMAX  Take 1 tablet (25 mg total) by mouth 2 (two) times daily     valGANciclovir 450 mg Tab  Commonly known as:  VALCYTE  Take 1 tablet (450 mg total) by mouth 2 (two) times daily.     * venlafaxine 37.5 MG 24 hr capsule  Commonly known as:  EFFEXOR-XR  Take 1 capsule (37.5 mg total) by mouth once daily.     * venlafaxine 150 MG Cp24  Commonly known as:  EFFEXOR-XR  Take 1 capsule (150 mg total) by mouth once daily.     voriconazole 200 MG Tab  Commonly known as:  VFEND  Take 1 tablet (200 mg total) by mouth 2 (two) times daily.         * This list has 2 medication(s) that are the same as other medications prescribed for you.  Read the directions carefully, and ask your doctor or other care provider to review them with you.              Patient Instructions:      Diet general     Call MD for:  temperature >101     Call MD for:  coughing up blood greater than 3 tablespoons in volume     Call MD for:  chest pain     Call MD for:  difficulty breathing or shortness of breath     Call MD for:  development of yellow/green sputum     Activity as tolerated     Follow Up:      Jake Alvarez MD  Lung Transplant  [facility]

## 2019-07-29 NOTE — TELEPHONE ENCOUNTER
----- Message from Valeria Banks sent at 7/29/2019  2:16 PM CDT -----  Contact: PATIENT   Needs Advice    Reason for call: Pt wishes to speak with coordinator concerning medication         Communication Preference:  [phone]    Additional Information: n/a

## 2019-07-29 NOTE — TELEPHONE ENCOUNTER
Returned call to patient who states that she is allergic to tegaderm but has to use it with the wound vac.  She states that she is itching pretty badly at the tegaderm sites.  Patient is currently taking IV benadryl but says that it's not helping.  She has taken Atarax and benadryl combination in the past and has helped.  Patient asked if she can take Atarax 10 mg every 6 hours as needed for itching.  She says that she has some available at home.  Would speak to Dr. Morin for approval first.  Contacted Dr. Morin and notified her about the above patient complaint.  Dr. Morin says that the Atarax is fine to take as needed for itching with the benadryl while she is using the tegaderm.  Returned call to patient and instructed her to take Atarax 10 mg every 6 hours as needed for itching while using tegaderm.  Patient verbalized understanding and did not have any further questions at this time.

## 2019-07-29 NOTE — PROGRESS NOTES
Critical lab value received from Librado from lab.  Staff message and secure message sent to Dr. Morin notifying her of the critical lab value.

## 2019-07-29 NOTE — TELEPHONE ENCOUNTER
Returned call to Shawn who states that Vanc has been administered at noon and midnight and wishes to reschedule labs scheduled this morning.  Instructed Shawn to have patient get labs today at noon for a Vanc trough.  Shawn verbalized understanding and labs pushed back for noon today.

## 2019-07-29 NOTE — TELEPHONE ENCOUNTER
----- Message from Valeria Banks sent at 7/29/2019  8:45 AM CDT -----  Contact: jahaira douglass   Needs Advice    Reason for call: Caregiver wishes to discuss rescheduling Labs  w/Coordiantor          Communication Preference:  Shawn [phone]    Additional Information:  n/a

## 2019-07-31 NOTE — TELEPHONE ENCOUNTER
----- Message from Francisca Morin DO sent at 7/31/2019  1:18 PM CDT -----  No changes.  Increase Mg to TID

## 2019-07-31 NOTE — TELEPHONE ENCOUNTER
Received written orders from Dr. Morin to increase magnesium to 3 times daily from twice daily.  Contacted patient instructing her to make the above dose change. Patient verbalized understanding.  Informed patient that we will also be restarting Vancomycin at 500 mg bid.  We will need a vanc trough after 4 doses.  Asked patient to contact us when she restarts the Vanc.  Patient verbalized understanding and will call us when she restarts the Vanc so we can schedule labs accordingly.

## 2019-07-31 NOTE — PROGRESS NOTES
LUNG TRANSPLANT RECIPIENT FOLLOW-UP    Reason for Visit: Follow-up after lung transplantation.                                                                                                         Date of Transplant:   1. 6/12/2014  2. Redo 6/18/19                                                                               Reason for Transplant:   1. Cystic Fibrosis  2. CARLOS EDUARDO                                                                               Type of Transplant: bilateral sequential lung                                                                               CMV Status: D+ / R-     Hepatitis C Status:  Donor Ab:(--)  Donor ANY:(--)  Recipient serostatus:(--)  Treatment status: NA                                                                              Major Complications:   1. Vocal cord dysfunction- resolved   2. A1 Rejection 8/2014   3. C glabrata positive sputum-recurrent  4. Pseudomonas pneumonia in 02/2015 resolved   5. 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 CARLOS EDUARDO (grade 3)  7. Chronic respiratory failure                                                                               History of Present Illness: Juanita Ibarra is a 29 y.o. year old female with the above listed transplant history presents today for routine follow-up.  Since her last visit, she was hospitalized after an outpatient CTS visit with Dr. Newby which noted dehiscence of her surgical incision.  She was discharged with a WV and empiric Vanc/Cefepime.  She continues to complain of increased back pain associated with muscle spasms.  She has a cough which is occasionally productive but for the most part feels like she cannot clear her secretions.  She has been working with home PT and increasing her ambulation around the house.  She is frustrated over her pain and failure to progress as quickly as she would like.  Takes all medications as directed.       Review of Systems   Constitutional: Positive for malaise/fatigue. Negative  "for chills, diaphoresis, fever and weight loss.   HENT: Negative for congestion, ear discharge, ear pain, hearing loss, nosebleeds and sore throat.    Eyes: Negative for blurred vision, double vision and photophobia.   Respiratory: Positive for cough, sputum production and shortness of breath. Negative for hemoptysis and wheezing.    Cardiovascular: Negative for chest pain, palpitations, orthopnea, claudication, leg swelling and PND.   Gastrointestinal: Negative for abdominal pain, blood in stool, constipation, diarrhea, heartburn, melena, nausea and vomiting.   Genitourinary: Negative for dysuria, flank pain, frequency, hematuria and urgency.   Musculoskeletal: Positive for back pain and myalgias. Negative for falls, joint pain and neck pain.   Skin: Negative for itching and rash.   Neurological: Positive for weakness. Negative for dizziness, tremors, sensory change, loss of consciousness and headaches.   Endo/Heme/Allergies: Does not bruise/bleed easily.   Psychiatric/Behavioral: Negative for depression, hallucinations and memory loss. The patient is not nervous/anxious and does not have insomnia.      /70   Pulse 110   Temp 98 °F (36.7 °C) (Oral)   Resp 20   Ht 5' 1" (1.549 m)   Wt 52.6 kg (116 lb)   LMP 10/02/2016 (LMP Unknown)   SpO2 96%   BMI 21.92 kg/m²     Physical Exam   Constitutional: She is oriented to person, place, and time and well-developed, well-nourished, and in no distress. No distress.   HENT:   Head: Normocephalic and atraumatic.   Nose: Nose normal.   Mouth/Throat: Oropharynx is clear and moist. No oropharyngeal exudate.   Eyes: Pupils are equal, round, and reactive to light. Conjunctivae and EOM are normal. No scleral icterus.   Neck: Normal range of motion. Neck supple. No JVD present. No tracheal deviation present. No thyromegaly present.   Cardiovascular: Normal rate, regular rhythm and intact distal pulses. Exam reveals no gallop and no friction rub.   No murmur " heard.  Pulmonary/Chest: Effort normal. No stridor. No respiratory distress. She has no wheezes. She has no rales. She exhibits no tenderness.   Abdominal: Soft. Bowel sounds are normal. She exhibits no distension and no mass. There is no tenderness. There is no rebound and no guarding.   Musculoskeletal: Normal range of motion. She exhibits no edema.   Lymphadenopathy:     She has no cervical adenopathy.   Neurological: She is alert and oriented to person, place, and time. No cranial nerve deficit. Gait normal. Coordination normal.   Skin: Skin is warm and dry. No rash noted. She is not diaphoretic. No erythema. No pallor.   WV in place over right portion of her clamshell incision     Psychiatric: Mood and affect normal.     Labs:  cbc, cmp, tacrolimus Latest Ref Rng & Units 7/25/2019 7/29/2019 7/31/2019   TACROLIMUS LVL 5.0 - 15.0 ng/mL - - 8.2   WHITE BLOOD CELL COUNT 3.90 - 12.70 K/uL 5.29 6.93 6.74   RBC 4.00 - 5.40 M/uL 3.18(L) 3.15(L) 3.36(L)   HEMOGLOBIN 12.0 - 16.0 g/dL 9.4(L) 9.3(L) 9.8(L)   HEMATOCRIT 37.0 - 48.5 % 32.0(L) 31.3(L) 33.6(L)   MCV 82 - 98 fL 101(H) 99(H) 100(H)   MCH 27.0 - 31.0 pg 29.6 29.5 29.2   MCHC 32.0 - 36.0 g/dL 29.4(L) 29.7(L) 29.2(L)   RDW 11.5 - 14.5 % 20.3(H) 19.8(H) 19.8(H)   PLATELETS 150 - 350 K/uL 202 156 173   MPV 9.2 - 12.9 fL 11.5 12.1 11.5   GRAN # 1.8 - 7.7 K/uL - 6.1 5.3   LYMPH # 1.0 - 4.8 K/uL - 0.4(L) 0.9(L)   MONO # 0.3 - 1.0 K/uL - 0.2(L) 0.2(L)   EOSINOPHIL% 0.0 - 8.0 % 0.0 0.3 1.0   BASOPHIL% 0.0 - 1.9 % 0.0 0.4 0.6   DIFFERENTIAL METHOD - Manual Automated Automated   SODIUM 136 - 145 mmol/L 138 138 139   POTASSIUM 3.5 - 5.1 mmol/L 4.3 4.7 4.6   CHLORIDE 95 - 110 mmol/L 104 110 107   CO2 23 - 29 mmol/L 24 20(L) 22(L)   GLUCOSE 70 - 110 mg/dL 114(H) 175(H) 156(H)   BUN BLD 6 - 20 mg/dL 20 15 14   CREATININE 0.5 - 1.4 mg/dL 0.9 0.8 0.9   CALCIUM 8.7 - 10.5 mg/dL 8.9 8.9 9.2   PROTEIN TOTAL 6.0 - 8.4 g/dL - 6.5 7.0   ALBUMIN 3.5 - 5.2 g/dL - 2.7(L) 3.0(L)    BILIRUBIN TOTAL 0.1 - 1.0 mg/dL - 0.2 0.2   ALK PHOS 55 - 135 U/L - 170(H) 178(H)   AST 10 - 40 U/L - 9(L) 10   ALT 10 - 44 U/L - 9(L) 8(L)   ANION GAP 8 - 16 mmol/L 10 8 10   EGFR IF AFRICAN AMERICAN >60 mL/min/1.73 m:2 >60.0 >60.0 >60.0   EGFR IF NON-AFRICAN AMERICAN >60 mL/min/1.73 m:2 >60.0 >60.0 >60.0     Pulmonary Function Tests 7/31/2019 7/19/2019 6/6/2019 5/1/2019 1/9/2019 11/26/2018 10/22/2018   FVC 1.19 1.45 1.42 1.45 1.79 1.85 2.23   FEV1 0.6 1.27 0.59 0.61 0.71 0.78 0.9   TLC (liters) - - - - - - -   DLCO (ml/mmHg sec) - - - - - - -   FVC% 34.7 42.5 44 42.5 56 54.2 65.3   FEV1% 20.7 43.6 21 20.9 25 26.6 30.9   FEF 25-75 - - - 0.24 0.28 0.33 0.35   FEF 25-75% - - - 7.4 8 9.8 10.5   TLC% - - - - - - -   RV - - - - - - -   RV% - - - - - - -   DLCO% - - - - - - -       Imaging:  Results for orders placed during the hospital encounter of 07/31/19   X-Ray Chest PA And Lateral    Narrative EXAMINATION:  XR CHEST PA AND LATERAL    CLINICAL HISTORY:  s/p bilateral lung transplant 6/12/2014; Lung transplant status    TECHNIQUE:  PA and lateral views of the chest were performed.    COMPARISON:  No 07/26/2019 ne    FINDINGS:  Postoperative changes and central venous catheter as before.  Mild cardiomegaly.  The a lungs are slightly better aerated as compared to the previous study.  There is still an area of ill-defined the opacity identified in the right upper lobe.  The right costophrenic angle is blunted suggesting pleural effusion.  Slight opacification noted at the left lung base.  No extrapulmonary air identified.      Impression See above      Electronically signed by: Pradip Cohen MD  Date:    07/31/2019  Time:    09:35       Assessment:  1. Encounter following organ transplant    2. S/P lung transplant    3. Immunosuppression    4. Prophylactic antibiotic    5. Sternal wound dehiscence, initial encounter    6. Chronic pain syndrome    7. Pancreatic insufficiency      Plan:   1. FEV1 down significantly from  previous.  Difficult to interpret due to patients back and sternal pain limiting her ability to take deep inspirations.  CXR remains with right sided opacities that are relatively stable compared to previous.  Symptomatically continues to complain of dyspnea, pain, and difficulty clearing secretions.  She completed IV abx with Zerbaxa and Micafungin for Pseudomonas and Candida Glabrata found during her hospital stay.  On empiric Vanc/Cefepime while the WV is in place; Vanc decreased to 500mg BID due to supratherapeutic trough.  Most recent bronchoscopy without rejection.  Cell counts concerning for infection but cultures and viral panel were all negative.  Will repeat spirometry and imaging in 1 week.  Low threshold for another bronchoscopy for cleanout/cultures.    2. Continue with Tacrolimus 1mg BID, MMF 250mg BID, and Prednisone.  No dose changes needed today.    3. Continue with Dapsone, Valgan, and Vori.      4. Continue with WV to sternal incision with home health care.  Empiric Vanc/Cef while WV is in place.    5. Chronic pain managed by her primary pain management physician.      6.  Continue with pancreatic enzymes with meals/snacks.      7. Follow-up in 1 week.      Francisca Morin D.O.  Lung Transplant  Pulmonary/Critical Care Medicine

## 2019-08-01 NOTE — TELEPHONE ENCOUNTER
----- Message from Ellie De Jesus sent at 8/1/2019 11:02 AM CDT -----  Contact: [facility] [phone]  .Medication question / problems.  Pls contact [facility] at [phone]. Duration is missing from Vancomycin order that was sent.     [phone] (Fax)

## 2019-08-07 NOTE — PROGRESS NOTES
LUNG TRANSPLANT RECIPIENT FOLLOW-UP    Reason for Visit: Follow-up after lung transplantation.                                                                                                         Date of Transplant:   1. 6/12/14  2. 6/18/19                                                                               Reason for Transplant: CARLOS EDUARDO                                                                               Type of Transplant: bilateral sequential lung                                                                               CMV Status: D- / R+     Hepatitis C Status:  Donor Ab:(--)  Donor ANY:(--)  Recipient serostatus:(--)  Treatment status: n/a                                                                              Major Complications:   1. Post-operative pain                                                                               History of Present Illness: Juanita Ibarra is a 29 y.o. year old female who presents to her routine clinic visit after lung re-transplantation for CARLOS EDUARDO. She had to be admitted about two weeks ago with a dehisced right thoracotomy and a wound vac was place. She was also prescribed cefepime and vancomycin for antimicrobial coverage of the open wound. She says today that she feels tired, has pain in her sternum and back and that she has a cough which is productive of yellow/green thick phlegm. Also says that she will feel winded at rest or exertion. She does not appear uncomfortable during the visit.     Review of Systems   Constitutional: Positive for malaise/fatigue. Negative for chills, diaphoresis, fever and weight loss.   HENT: Negative for congestion, ear discharge, ear pain, hearing loss, nosebleeds and sore throat.    Eyes: Negative for blurred vision, double vision and photophobia.   Respiratory: Positive for cough and sputum production. Negative for hemoptysis, shortness of breath and wheezing.    Cardiovascular: Negative for chest pain,  "palpitations, orthopnea, claudication, leg swelling and PND.   Gastrointestinal: Negative for abdominal pain, blood in stool, constipation, diarrhea, heartburn, melena, nausea and vomiting.   Genitourinary: Negative for dysuria, flank pain, frequency, hematuria and urgency.   Musculoskeletal: Positive for back pain and myalgias. Negative for falls, joint pain and neck pain.   Skin: Negative for itching and rash.   Neurological: Positive for weakness. Negative for dizziness, tremors, sensory change, loss of consciousness and headaches.   Endo/Heme/Allergies: Does not bruise/bleed easily.   Psychiatric/Behavioral: Negative for depression, hallucinations and memory loss. The patient is not nervous/anxious and does not have insomnia.      BP (!) 116/91   Pulse 110   Temp 98.8 °F (37.1 °C) (Oral)   Resp 20   Ht 5' 1" (1.549 m)   Wt 51.7 kg (114 lb)   LMP 10/02/2016 (LMP Unknown)   SpO2 (!) 93%   BMI 21.54 kg/m²     Physical Exam   Constitutional: She is oriented to person, place, and time and well-developed, well-nourished, and in no distress. No distress.   HENT:   Head: Normocephalic and atraumatic.   Nose: Nose normal.   Mouth/Throat: Oropharynx is clear and moist. No oropharyngeal exudate.   Eyes: Pupils are equal, round, and reactive to light. Conjunctivae and EOM are normal. No scleral icterus.   Neck: Normal range of motion. Neck supple. No JVD present. No tracheal deviation present. No thyromegaly present.   Cardiovascular: Normal rate, regular rhythm and intact distal pulses. Exam reveals no gallop and no friction rub.   No murmur heard.  Pulmonary/Chest: Effort normal. No stridor. No respiratory distress. She has no wheezes. She has rales in the right middle field and the right lower field. She exhibits no tenderness.   Wound vac in place   Abdominal: Soft. Bowel sounds are normal. She exhibits no distension and no mass. There is no tenderness. There is no rebound and no guarding.   Musculoskeletal: " Normal range of motion. She exhibits no edema.   Lymphadenopathy:     She has no cervical adenopathy.   Neurological: She is alert and oriented to person, place, and time. No cranial nerve deficit. Gait normal. Coordination normal.   Skin: Skin is warm and dry. No rash noted. She is not diaphoretic. No erythema. No pallor.   Clean clamshell incision   Psychiatric: Mood and affect normal.     Labs:  cbc, cmp, tacrolimus Latest Ref Rng & Units 7/29/2019 7/31/2019 8/7/2019   TACROLIMUS LVL 5.0 - 15.0 ng/mL - 8.2 8.5   WHITE BLOOD CELL COUNT 3.90 - 12.70 K/uL 6.93 6.74 8.46   RBC 4.00 - 5.40 M/uL 3.15(L) 3.36(L) 3.51(L)   HEMOGLOBIN 12.0 - 16.0 g/dL 9.3(L) 9.8(L) 10.6(L)   HEMATOCRIT 37.0 - 48.5 % 31.3(L) 33.6(L) 35.3(L)   MCV 82 - 98 fL 99(H) 100(H) 101(H)   MCH 27.0 - 31.0 pg 29.5 29.2 30.2   MCHC 32.0 - 36.0 g/dL 29.7(L) 29.2(L) 30.0(L)   RDW 11.5 - 14.5 % 19.8(H) 19.8(H) 18.5(H)   PLATELETS 150 - 350 K/uL 156 173 194   MPV 9.2 - 12.9 fL 12.1 11.5 10.9   GRAN # 1.8 - 7.7 K/uL 6.1 5.3 6.6   LYMPH # 1.0 - 4.8 K/uL 0.4(L) 0.9(L) 1.2   MONO # 0.3 - 1.0 K/uL 0.2(L) 0.2(L) 0.3   EOSINOPHIL% 0.0 - 8.0 % 0.3 1.0 1.7   BASOPHIL% 0.0 - 1.9 % 0.4 0.6 1.3   DIFFERENTIAL METHOD - Automated Automated Automated   SODIUM 136 - 145 mmol/L 138 139 138   POTASSIUM 3.5 - 5.1 mmol/L 4.7 4.6 4.7   CHLORIDE 95 - 110 mmol/L 110 107 103   CO2 23 - 29 mmol/L 20(L) 22(L) 25   GLUCOSE 70 - 110 mg/dL 175(H) 156(H) 121(H)   BUN BLD 6 - 20 mg/dL 15 14 16   CREATININE 0.5 - 1.4 mg/dL 0.8 0.9 1.0   CALCIUM 8.7 - 10.5 mg/dL 8.9 9.2 10.3   PROTEIN TOTAL 6.0 - 8.4 g/dL 6.5 7.0 7.6   ALBUMIN 3.5 - 5.2 g/dL 2.7(L) 3.0(L) 3.0(L)   BILIRUBIN TOTAL 0.1 - 1.0 mg/dL 0.2 0.2 0.3   ALK PHOS 55 - 135 U/L 170(H) 178(H) 164(H)   AST 10 - 40 U/L 9(L) 10 10   ALT 10 - 44 U/L 9(L) 8(L) 8(L)   ANION GAP 8 - 16 mmol/L 8 10 10   EGFR IF AFRICAN AMERICAN >60 mL/min/1.73 m:2 >60.0 >60.0 >60.0   EGFR IF NON-AFRICAN AMERICAN >60 mL/min/1.73 m:2 >60.0 >60.0 >60.0      Pulmonary Function Tests 8/7/2019 7/31/2019 7/19/2019 6/6/2019 5/1/2019 1/9/2019 11/26/2018   FVC 1.22 1.19 1.45 1.42 1.45 1.79 1.85   FEV1 0.69 0.6 1.27 0.59 0.61 0.71 0.78   TLC (liters) - - - - - - -   DLCO (ml/mmHg sec) - - - - - - -   FVC% 35.6 34.7 42.5 44 42.5 56 54.2   FEV1% 23.9 20.7 43.6 21 20.9 25 26.6   FEF 25-75 - - - - 0.24 0.28 0.33   FEF 25-75% - - - - 7.4 8 9.8   TLC% - - - - - - -   RV - - - - - - -   RV% - - - - - - -   DLCO% - - - - - - -       Imaging:  Results for orders placed during the hospital encounter of 08/07/19   X-Ray Chest PA And Lateral    Narrative EXAMINATION:  XR CHEST PA AND LATERAL    CLINICAL HISTORY:  Lung transplant status    FINDINGS:  Central line mid SVC.  Heart size is normal.  There is bilateral edema/infiltrate.  There is a right lung nodule.  There is possible right pleural fluid.      Impression Right lung nodule worrisome for malignancy.    Interstitial edema versus pneumonia.    There may be some chronic lung change.    Small right pleural effusion.      Electronically signed by: Christos Deleon MD  Date:    08/07/2019  Time:    08:55         Assessment:  1. Encounter following organ transplant    2. Complication of transplanted lung, unspecified complication    3. LRTI (lower respiratory tract infection)    4. Immunosuppression    5. Prophylactic antibiotic    6. Aspergillosis    7. Debility      Plan:   1. Will start her today on ceftolozane/tazobactam and micafungin based on most recent respiratory cultures. I suspect that because of her open wound and wound vac she has not been able to clear secretions well and has now developed a lower respiratory tract infection and it is refelcting as a decrease in her FEV1. Sputum cultures obtained today.     2. Continue tacrolimus, prednisone. Adjust tacrolimus to keep level between 10-12. Will hold mycophenolate while infection clears.     3. Continue dapsone and valganciclovir.    4. Continue voriconazole for at least  three months.    5. Currently having home therapy. Will transition to pulmonary rehab when appropriate.    6. RTC in 1 week

## 2019-08-07 NOTE — TELEPHONE ENCOUNTER
----- Message from Enedina SMITH Do, RN sent at 8/7/2019  3:10 PM CDT -----  Orders for IV antibiotics (Zerbaxa and Micafungin)  placed today to have done through [facility].  Please contact [facility] to set up delivery.    Thanks,  Aletha MANJARREZ rcvd above orders for IV antibitoics. Pt currently receiving abx through [facility] (Ph [phone]/fx [phone]). [facility] faxed orders and spoke with Aiyana rivers states rcvd order and will be setting up delivery of meds with pt. [facility] remains available.      yes

## 2019-08-07 NOTE — TELEPHONE ENCOUNTER
Received vorb from Dr. Alvarez instructing patient to stop taking Cellcept until further notice.  Patient is stopping for a short period of time to recover from bronchitis.  My chart message sent to patient instructing her to make the above medication change.  Asked patient to contact us to confirm that message was understood and if she has any questions.

## 2019-08-08 NOTE — PROGRESS NOTES
Outpatient Psychiatry Follow-Up Visit (MD/NP)    8/8/2019    Clinical Status of Patient:  Outpatient (Ambulatory)    Chief Complaint:  Junaita Ibarra is a 29 y.o. female who presents today for follow-up of depression and anxiety.  Met with patient.      Interval History and Content of Current Session:  Interim Events/Subjective Report/Content of Current Session: She had her bilateral lung transplants in June followed by one month hospitalization.  She coped remarkably well with this.  There is some understandable frustration.  She has some persistent anxiety.  Will increase Seroquel.  Also on stroids.    Psychotherapy:  · Target symptoms: depression, anxiety   · Why chosen therapy is appropriate versus another modality: relevant to diagnosis  · Outcome monitoring methods: self-report, observation  · Therapeutic intervention type: supportive psychotherapy  · Topics discussed/themes: stress related to medical comorbidities  · The patient's response to the intervention is accepting. The patient's progress toward treatment goals is good.   · Duration of intervention: 10 minutes.    Review of Systems   · PSYCHIATRIC: Pertinant items are noted in the narrative.    Past Medical, Family and Social History: The patient's past medical, family and social history have been reviewed and updated as appropriate within the electronic medical record - see encounter notes.    Compliance: yes    Side effects: None    Risk Parameters:  Patient reports no suicidal ideation  Patient reports no homicidal ideation  Patient reports no self-injurious behavior  Patient reports no violent behavior    Exam (detailed: at least 9 elements; comprehensive: all 15 elements)   Constitutional  Vitals:  Most recent vital signs, dated less than 90 days prior to this appointment, were reviewed.   Vitals:    08/08/19 0824   BP: 121/89   Pulse: (!) 120   Weight: 51.7 kg (114 lb)   Height: 5' (1.524 m)        General:  unremarkable, age  appropriate     Musculoskeletal  Muscle Strength/Tone:  no tremor   Gait & Station:  in wheelchair     Psychiatric  Speech:  no latency; no press   Mood & Affect:  anxious  congruent and appropriate   Thought Process:  normal and logical   Associations:  intact   Thought Content:  normal, no suicidality, no homicidality, delusions, or paranoia   Insight:  intact   Judgement: behavior is adequate to circumstances   Orientation:  grossly intact   Memory: intact for content of interview   Language: grossly intact   Attention Span & Concentration:  able to focus   Fund of Knowledge:  intact and appropriate to age and level of education     Assessment and Diagnosis   Status/Progress: Based on the examination today, the patient's problem(s) is/are adequately but not ideally controlled.  New problems have not been presented today.   Co-morbidities are complicating management of the primary condition.  There are no active rule-out diagnoses for this patient at this time.     General Impression: Coping adequately      ICD-10-CM ICD-9-CM   1. Generalized anxiety disorder F41.1 300.02       Intervention/Counseling/Treatment Plan   · Medication Management: Continue current medications. The risks and benefits of medication were discussed with the patient. except increas Seroquel 25 mg to 1 in AM and 2 at bedtime.      Return to Clinic: 2 months

## 2019-08-14 NOTE — TELEPHONE ENCOUNTER
----- Message from Luis Shahid sent at 8/14/2019  1:42 PM CDT -----  Contact: [facility]/ [phone] Vince Faust was calling to speak with a nurse about the patient medications.

## 2019-08-14 NOTE — PROGRESS NOTES
Received vorb from Dr. Alvarez to schedule patient for an urgent OR bronchoscopy.  Orders entered and scheduled for 8/15/19 at 7:00 AM.  Pre-procedure instructions reviewed with patient.  Patient verbalized understanding and did not have any further questions.

## 2019-08-14 NOTE — TELEPHONE ENCOUNTER
Returned call to Vince at [facility] who received a call from patient's significant other to let him know that Dr. Alvarez is extending patient's Zerbaxa.  After verifying orders from Dr. Alvarez, informed Vince that we are extending Zerbaxa for another week due to end on 8/22/19.  Vince will extend the orders and send patient's medication out tonight.  No other questions were asked.

## 2019-08-15 NOTE — ANESTHESIA PREPROCEDURE EVALUATION
08/15/2019  Juanita Ibarra is a 29 y.o., female.    Anesthesia Evaluation    I have reviewed the Patient Summary Reports.    I have reviewed the Nursing Notes.   I have reviewed the Medications.     Review of Systems  Anesthesia Hx:  No problems with previous Anesthesia  History of prior surgery of interest to airway management or planning: Denies Family Hx of Anesthesia complications.   Denies Personal Hx of Anesthesia complications.   Cardiovascular:   Hypertension ECG has been reviewed.    Pulmonary:  Pulmonary Normal CF, Bronchiolitis obliterans   Renal/:  Renal/ Normal     Hepatic/GI:  Hepatic/GI Normal    Musculoskeletal:  Musculoskeletal Normal    Neurological:   Headaches Seizures   Chronic Pain Syndrome   Endocrine:   Diabetes    Psych:   anxiety          Physical Exam  General:  Well nourished    Airway/Jaw/Neck:  Airway Findings: Mouth Opening: Normal Tongue: Normal  Jaw/Neck Findings:  Micrognathia: Negative Neck ROM: Normal ROM      Dental:  Dental Findings: In tact   Chest/Lungs:  Chest/Lungs Findings: Clear to auscultation, Normal Respiratory Rate     Heart/Vascular:  Heart Findings: Rate: Normal  Rhythm: Regular Rhythm  Sounds: Normal  Heart murmur: negative    Abdomen:  Abdomen Findings:  Normal, Nontender, Soft       Mental Status:  Mental Status Findings:  Cooperative, Alert and Oriented         Anesthesia Plan  Type of Anesthesia, risks & benefits discussed:  Anesthesia Type:  MAC, general  Patient's Preference:   Intra-op Monitoring Plan:   Intra-op Monitoring Plan Comments:   Post Op Pain Control Plan: multimodal analgesia, IV/PO Opioids PRN and per primary service following discharge from PACU  Post Op Pain Control Plan Comments:   Induction:   IV  Beta Blocker:  Patient is on a Beta-Blocker and has received one dose within the past 24 hours (No further documentation  required).       Informed Consent: Patient understands risks and agrees with Anesthesia plan.  Questions answered. Anesthesia consent signed with patient.  ASA Score: 3     Day of Surgery Review of History & Physical:    H&P update referred to the surgeon.         Ready For Surgery From Anesthesia Perspective.

## 2019-08-15 NOTE — H&P
[facility]  Lung Transplant  H&P    Patient Name: Juanita Ibarra  MRN: [id number]  Admission Date: 8/15/2019  Attending Physician: Jake Alvarez MD  Primary Care Provider: Primary Doctor No     Subjective:     History of Present Illness:  Juantia Ibarra is a 29 y.o. year old female who presented today for a bronchoscopy.  She is s/p lung re-transplantation for CARLOS EDUARDO.  She was recently started on ceftolozane/tazobactam and micafungin for sputum cultures growing two species of non-albicans Candida and Pseudomonas.  She reports overall not feeling well for the past few weeks and has been having intermittent fevers with increase in sputum production, and low oxygenation while she sleeps. Denies sick contacts and is compliant with her medications.        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       Past Surgical History:   Procedure Laterality Date    ABDOMINAL SURGERY      peg tube     [id number] N/A 5/19/2015    Performed by Deny Dias Jr[facility] at [facility] OR    BIOPSY-BRONCHUS N/A 12/20/2016    Performed by Jake Alvarez MD at [facility] OR 2ND FLR    Bronchoscopy N/A 7/5/2019    Performed by Francisca Morin DO at [facility] OR 2ND FLR    BRONCHOSCOPY Bilateral 12/11/2018    Performed by Francisca Morin DO at [facility] OR 2ND FLR    BRONCHOSCOPY N/A 10/1/2018    Performed by Jake Alvarez MD at [facility] OR 2ND FLR    BRONCHOSCOPY Bilateral 12/20/2016    Performed by Jake Alvarez MD at [facility] OR 2ND FLR    BRONCHOSCOPY - flexible  bronchoscopy with probable tissue biopsy CPT 00950 N/A 7/21/2015    Performed by Jake [facility]varez MD at [facility] OR 2ND FLR    BRONCHOSCOPY - flexible bronchoscopy with probable tissue biospy CPT 56779 N/A 10/20/2015    Performed by Jake Alvarez MD at [facility] OR 2ND FLR    BRONCHOSCOPY - flexible bronchoscopy with tissue biopsy CPT 14244 N/A 9/29/2015    Performed by Jake Alvarez MD at [facility] OR 2ND FLR    BRONCHOSCOPY - flexible bronchoscopy with tissue biopsy CPT 47102 N/A 5/26/2015    Performed by Jake Alvarez MD at [facility] OR 1ST FLR    BRONCHOSCOPY flexible bronchoscopy with tissue biopsy N/A 9/8/2014    Performed by Jake Alvarez MD at [facility] OR 2ND FLR    BRONCHOSCOPY flexible with possible tissue biopsy N/A 8/8/2014    Performed by Jake Alvarez MD at [facility] OR 2ND FLR    BRONCHOSCOPY, BAL, BIOPSIES N/A 3/20/2018    Performed by Jake Alvarez MD at [facility] OR 2ND FLR    BRONCHOSCOPY-FIBEROPTIC N/A 1/29/2016    Performed by Joann Cifuentes DO at [facility] OR 2ND FLR    BRONCHOSCOPY; Bronchoscopy with BAL and transbronchial biopsies under general anesthesia N/A 5/29/2018    Performed by Jake Alvarez MD at [facility] OR 2ND FLR    CHOLECYSTECTOMY  2016    CHOLECYSTECTOMY-LAPAROSCOPIC N/A 7/22/2016    Performed by Joshua Goldberg, MD at [facility] OR 2ND FLR    COLONOSCOPY N/A 5/3/2019    Performed by Juancho Rich MD at [facility] ENDO (2ND FLR)    ENDOMETRIAL ABLATION  2015    KJB    ETHMOIDECTOMY Bilateral 4/9/2019    Performed by Adin Burks III, MD at [facility] OR 2ND FLR    FESS      FESS Bilateral 10/21/2013    Performed by Jared Whatley MD at [facility] OR 2ND FLR    FESS, USING COMPUTER-ASSISTED NAVIGATION N/A 4/9/2019    Performed by Adin Burks III, MD at [facility] OR 2ND FLR    FINE NEEDLE ASPIRATION (FNA)  of a cervical lymphadenopathy  1/29/2016    Performed by Joann Cifuentes DO at [facility] OR 2ND FLR    flex bronchoscopy with probable tissue biopsy N/A 7/31/2014    Performed  by [facility] Diagnostic Provider at [facility] OR [address] [place] [address]    flexible bronchoscopy with tissue biopsy N/A 12/11/2014    Performed by Jake Alvarez MD at [facility] OR [address] [place] [address]    flexible bronchoscopy with tissue biopsy CPT 42962  N/A 7/26/2019    [facility]    HYSTERECTOMY  04/2017    [facility]    INJECTION-VOCAL CORD Left 6/24/2014    Performed by Jared Whatley MD at [facility] OR [address] [place] [address]    AJKTATKMG-XEED-T-CATH to right chest and removal of portacath to left chests wall. Bilateral 6/24/2014    Performed by Zhen Dorado MD at [facility] OR [address] [place] [address]    LARYNX SURGERY  2016    LUNG TRANSPLANT Bilateral 6/12/14    MAXILLARY ANTROSTOMY  4/9/2019    Performed by Adin Burks III, MD at [facility] OR [address] [place] [address]    MYRINGOTOMY W/ TUBES Right 04/2017    MYRINGOTOMY WITH INSERTION OF PE TUBES Right 4/15/2017    Performed by Gary Null MD at [facility] OR [address] [place] [address]    PLACEMENT-TUBE-PEG  5/15/2014    Performed [facility] David Moses [facility] at [facility]    PORTACATH PLACEMENT Right     rt sc    REDO BILATERAL LUNG TRANSPLANT; CLAMSHELL Bilateral 6/18/2019    Performed by Jonathan Newby MD at [facility] OR [address] [place] [address]    REMOVAL-TUBE-PEG  5/15/2014    Performed by David Moses MD at [facility] OR [address] [place] [address]    RHC for lung re-transplant N/A 1/22/2019    Performed by Laura Rachel MD at [facility] CATH LAB    ROBOTIC ASSISTED LAPAROSCOPIC HYSTERECTOMY N/A 4/25/2017    Performed by [facility]y Dias Jr[facility] at [facility] OR    SALPINGECTOMY Bilateral 2015    KJB    SALPINGECTOMY-LAPAROSCOPIC Bilateral 5/19/2015    Performed by [facility]y Dias Jr[facility] at [facility] OR    SINUS SURGERY FUNCTIONAL ENDOSCOPIC WITH NAVIGATION Bilateral 4/3/2017    Performed by Cesar Olsen MD at [facility] OR [address] [place] [address]    SINUS SURGERY FUNCTIONAL ENDOSCOPIC WITH NAVIGATION, 84463, 49096, 68158, 27188 Bilateral 2/5/2015    Performed by [facility]esar [facility]lsen [facility] at [facility]    SPHENOIDECTOMY Bilateral 4/9/2019    Performed by Adin MONTEJO  Vitaliy HERNANDEZ MD at [facility] OR 2ND FLR    THYROPLASTY - Medialization laryngoplasty, cricothyroid subluxation, arytenoid repositioning Left 8/2/2016    Performed by Gary Null [facility] at [facility] OR 2ND FLR    TRANSPLANT-LUNG Bilateral 6/11/2014    Performed by Adolfo Huston MD at [facility] OR 2ND FLR       Review of patient's allergies indicates:   Allergen Reactions    Albuterol Palpitations    Colistin Anaphylaxis    Vancomycin analogues      Infusion reaction that does not resolve with slowing  Pt. States she can tolerate it when given with 50 mg Benadryl and ran over 3 hours    Neupogen [filgrastim] Other (See Comments)     Ostealgia after five daily doses of 300 mcg.      Bactrim [sulfamethoxazole-trimethoprim] Hives    Ceftazidime Hives     Pt stated can tolerate cefapine not ceftazidime    Ceftazidime     Dronabinol Other (See Comments)     Mental changes/hallucinations    Haldol [haloperidol lactate] Other (See Comments)     Seizure like activity    Nsaids (non-steroidal anti-inflammatory drug)      Cannot have due to lung transplant    Adhesive Rash     Cloth tape- please use tegaderm or paper tape    Aztreonam Rash    Ciprofloxacin Nausea And Vomiting     Projectile N/V, per patient.  Unwilling to retry therapy.       PTA Medications   Medication Sig    calcium-vitamin D3 (OS-KATY 500 + D3) 500 mg(1,250mg) -200 unit per tablet Take 1 tablet by mouth 2 (two) times daily with meals.    dapsone 100 MG Tab Take 1 tablet (100 mg total) by mouth once daily.    diphenhydrAMINE (BENADRYL) 50 MG tablet Take 1 tablet (50 mg total) by mouth every 6 (six) hours as needed for Itching.    fexofenadine (ALLEGRA) 180 MG tablet Take 180 mg by mouth once daily.    fluticasone propionate (FLONASE) 50 mcg/actuation nasal spray INSERT 2 SPRAYS IN EACH NOSTRIL DAILY    folic acid (FOLVITE) 1 MG tablet Take 1 tablet (1,000 mcg total) by mouth once daily.    gabapentin (NEURONTIN) 300 MG capsule Take 1 capsule (300  mg total) by mouth 3 (three) times daily.    levalbuterol (XOPENEX) 1.25 mg/3 mL nebulizer solution Take 3 mLs (1.25 mg total) by nebulization every 8 (eight) hours as needed for Wheezing or Shortness of Breath. Rescue    lipase-protease-amylase (CREON) 36,000-114,000- 180,000 unit CpDR Take 4 capsules by mouth 3 (three) times daily with meals. Take 3 with snacks as needed. (Patient taking differently: Take 5 capsules by mouth 3 (three) times daily with meals. )    LORazepam (ATIVAN) 1 MG tablet Take 1 tablet (1 mg total) by mouth every 8 (eight) hours as needed for Anxiety.    magnesium oxide (MAG-OX) 400 mg (241.3 mg magnesium) tablet Take 1 tablet (400 mg total) by mouth 3 (three) times daily.    metoprolol tartrate (LOPRESSOR) 25 MG tablet Take 1 tablet (25 mg total) by mouth 2 (two) times daily.    montelukast (SINGULAIR) 10 mg tablet Take 1 tablet (10 mg total) by mouth once daily.    morphine (MS CONTIN) 15 MG 12 hr tablet Take 1 tablet by mouth twice daily.    multivit,min52-folic-vitK-cQ10 (AQUADEKS) 100-700-10 mcg-mcg-mg Cap cap 1 tab twice daily    omeprazole (PRILOSEC) 40 MG capsule Take 1 capsule (40 mg total) by mouth once daily.    ondansetron (ZOFRAN) 8 MG tablet Take 1 tablet (8 mg total) by mouth every 12 (twelve) hours as needed for Nausea.    oxyCODONE (ROXICODONE) 5 MG immediate release tablet Take 1 tablet by mouth four times daily    predniSONE (DELTASONE) 5 MG tablet Take 3 tablets (15 mg total) by mouth once daily.    QUEtiapine (SEROQUEL) 25 MG Tab Take 1 tablet by mouth in morning and 2 tablets at bedtime    SITagliptin (JANUVIA) 100 MG Tab Take 1 tablet (100 mg total) by mouth once daily.    tacrolimus (PROGRAF) 1 MG Cap Take 1 capsule (1 mg total) by mouth every 12 (twelve) hours.    tiZANidine (ZANAFLEX) 4 MG tablet Take 1 tablet (4 mg total) by mouth 2 (two) times daily.    topiramate (TOPAMAX) 25 MG tablet Take 1 tablet (25 mg total) by mouth 2 (two) times daily     valGANciclovir (VALCYTE) 450 mg Tab Take 1 tablet (450 mg total) by mouth 2 (two) times daily.    venlafaxine (EFFEXOR-XR) 150 MG Cp24 Take 1 capsule (150 mg total) by mouth once daily.    voriconazole (VFEND) 200 MG Tab Take 1 tablet (200 mg total) by mouth 2 (two) times daily.    hydrocortisone 1 % cream Apply topically 2 (two) times daily.    inhalation spacing device (PROCHAMBER) USE AS DIRECTED    polyethylene glycol (GLYCOLAX) 17 gram PwPk Take 17 g by mouth 2 (two) times daily.    venlafaxine (EFFEXOR-XR) 37.5 MG 24 hr capsule Take 1 capsule (37.5 mg total) by mouth once daily.     Family History     Problem Relation (Age of Onset)    Cancer Maternal Grandmother    Diabetes Maternal Grandfather    Drug abuse Maternal Grandfather    Hypertension Maternal Grandmother    Thrombocytopenia Maternal Grandfather        Tobacco Use    Smoking status: Never Smoker    Smokeless tobacco: Never Used   Substance and Sexual Activity    Alcohol use: No     Comment: Has not had an alcoholic drink in more than 2 months.    Drug use: No    Sexual activity: Yes     Partners: Male     Birth control/protection: See Surgical Hx     Comment: current partner since Oct 2016     Review of Systems   Constitutional: Positive for chills, fatigue and fever. Negative for activity change, appetite change and unexpected weight change.   HENT: Negative for congestion, postnasal drip, rhinorrhea, sinus pressure, sneezing, sore throat, trouble swallowing and voice change.    Eyes: Negative for photophobia and visual disturbance.   Respiratory: Positive for cough and shortness of breath. Negative for apnea, chest tightness, wheezing and stridor.    Cardiovascular: Negative for chest pain, palpitations and leg swelling.   Gastrointestinal: Negative for abdominal distention, abdominal pain, constipation, diarrhea, nausea and vomiting.   Endocrine: Negative for polydipsia, polyphagia and polyuria.   Genitourinary: Negative for dysuria,  flank pain, frequency and hematuria.   Musculoskeletal: Positive for back pain (chronic) and myalgias (chronic). Negative for arthralgias and joint swelling.   Skin: Negative for color change, pallor and rash.   Allergic/Immunologic: Positive for immunocompromised state.   Neurological: Positive for weakness. Negative for dizziness, tremors, seizures, syncope, speech difficulty, light-headedness, numbness and headaches.   Hematological: Does not bruise/bleed easily.   Psychiatric/Behavioral: Negative for agitation, confusion, decreased concentration, hallucinations, self-injury, sleep disturbance and suicidal ideas. The patient is not nervous/anxious.      Objective:     Vital Signs (Most Recent):  Temp: 97.8 °F (36.6 °C) (08/15/19 0800)  Pulse: (!) 118 (08/15/19 1245)  Resp: (!) 39 (08/15/19 1245)  BP: (!) 147/101 (08/15/19 1200)  SpO2: (!) 4 % (08/15/19 1245) Vital Signs (24h Range):  Temp:  [97.8 °F (36.6 °C)-99.1 °F (37.3 °C)] 97.8 °F (36.6 °C)  Pulse:  [113-127] 118  Resp:  [14-46] 39  SpO2:  [4 %-96 %] 4 %  BP: (113-147)/() 147/101     Weight: 51.7 kg (114 lb)  Body mass index is 22.26 kg/m².      Intake/Output Summary (Last 24 hours) at 8/15/2019 1613  Last data filed at 8/15/2019 1100  Gross per 24 hour   Intake 420 ml   Output 300 ml   Net 120 ml       Physical Exam   Constitutional: She is oriented to person, place, and time. She appears well-developed and well-nourished.   HENT:   Head: Normocephalic and atraumatic.   Eyes: Conjunctivae and EOM are normal.   Neck: Normal range of motion.   Cardiovascular: Normal rate and regular rhythm.   Pulmonary/Chest: She has rales in the right middle field and the right lower field.   Wound vac in place   Abdominal: Soft.   Musculoskeletal: Normal range of motion.   Neurological: She is alert and oriented to person, place, and time.   Skin: Skin is warm and dry.   Psychiatric: She has a normal mood and affect.       Significant Labs:  CBC:  Recent Labs   Lab  08/15/19  1130   WBC 7.93   RBC 2.55*   HGB 8.1*   HCT 26.9*   *   *   MCH 31.8*   MCHC 30.1*     BMP:  Recent Labs   Lab 08/15/19  1046      K 4.7      CO2 24   BUN 23*   CREATININE 1.1   CALCIUM 9.5        I have reviewed all pertinent labs within the past 24 hours.    Diagnostic Results:  X-Ray: Reviewed  Ill-defined patchy airspace consolidation bilaterally more marked on the right side.  There is also blunting of the right costophrenic angle suggesting pleural effusion.  No extrapulmonary air identified.    Assessment/Plan:     * Complication of transplanted lung  Will continue home antibiotic of Zerbaxa and antifungal Micafungin.  Bronchoscopy done today, will follow up on BAL and pathology.  Will start pulse steroids prophylactically for rejection.  Oxygen supplementation as needed    Immunosuppression  Continue prednisone and tacrolimus.  Will monitor tacrolimus levels daily while inpatient.    Prophylactic antibiotic  Continue Valcyte and dapsone    CF related Pancreatic insufficiency  Continue pancreatic enzymes.  Will consult endocrinology for blood sugar management    Chronic pain with opiate use  Will continue home medications of MS Contin and oxycodone         Reyes Will NP  Lung Transplant  [facility]

## 2019-08-15 NOTE — CONSULTS
[facility]  Endocrinology  Diabetes Consult Note    Consult Requested by: Jake Alvarez MD   Reason for admit: Complication of transplanted lung    HISTORY OF PRESENT ILLNESS:  Reason for Consult: Management of  Hyperglycemia     Surgical Procedure and Date: lung transplant in 2014; 6/18/19    Diabetes diagnosis year: borderline GTT  Per previous notes - not on any medication prior to transplant     Home Diabetes Medications:  januvia 100 mg daily after second lung transplant     How often checking glucose at home? Not checking   BG readings on regimen: DAVE   Hypoglycemia on the regimen?  DAVE  Missed doses on regimen?  No    Diabetes Complications include:     None     Complicating diabetes co morbidities:   Glucocorticoid use       HPI:   Patient is a 29 y.o. female with a diagnosis of steroid induced hyperglycemia. Patient presented today for a bronchoscopy.  She is s/p lung re-transplantation for CARLOS EDUARDO.   She reports overall not feeling well for the past few weeks and has been having intermittent fevers with increase in sputum production, and low oxygenation while she sleeps per lung transplant note. Endocrinology consulted for BG management.                Interval HPI:   Remains in DOSC. Drowsy from bronch today. Family at bedside. BG at goal this AM. Above goal on more recent BG check. Solumedrol 500 mg; steroids per primary.   Eating:  Advanced- has not eaten yet   Nausea: No  Hypoglycemia and intervention: No  Fever: No  TPN and/or TF: No    PMH, PSH, FH, SH  reviewed     Review of Systems   Unable to obtain due to: Sedation,Intubation,Altered mental status,Critical illness,Reviewed ROS from note dated 8/15/19 per Chambers KUMAR Will    Current Medications and/or Treatments Impacting Glycemic Control  Immunotherapy:    Immunosuppressants         Stop Route Frequency     tacrolimus capsule 1 mg      -- Oral 2 times daily        Steroids:   Hormones (From admission, onward)    Start     Stop  Route Frequency Ordered    08/18/19 0900  predniSONE tablet 15 mg      -- Oral Daily 08/15/19 0828    08/15/19 0800  methylPREDNISolone sodium succinate (SOLU-MEDROL) 500 mg in dextrose 5 % 100 mL IVPB      08/18 0859 IV Daily 08/15/19 0759        Pressors:    Autonomic Drugs (From admission, onward)    None        Hyperglycemia/Diabetes Medications:   Antihyperglycemics (From admission, onward)    Start     Stop Route Frequency Ordered    08/15/19 0930  SITagliptin tablet 100 mg      -- Oral Daily 08/15/19 0828             PHYSICAL EXAMINATION:  Vitals:    08/15/19 1645   BP: 118/82   Pulse: 97   Resp:    Temp: 97.2 °F (36.2 °C)     Body mass index is 22.26 kg/m².    Physical Exam   Constitutional: Well developed, well nourished, NAD.  ENT: External ears no masses with nose patent; normal hearing.  Neck: Supple; trachea midline.  Cardiovascular: Normal heart sounds, no LE edema. DP +2 bilaterally.  Lungs: Normal effort; lungs anterior bilaterally crackles to auscultation.  Abdomen: Soft, no masses, no hernias.  MS: No clubbing or cyanosis of nails noted;  unable to assess gait.  Skin: No rashes, lesions, or ulcers; no nodules.  Psychiatric: DAVE  Neurological: DAVE  Foot: nails in good condition, no amputations noted.          Labs Reviewed and Include   Recent Labs   Lab 08/15/19  1046   *   CALCIUM 9.5   ALBUMIN 2.4*   PROT 6.8      K 4.7   CO2 24      BUN 23*   CREATININE 1.1   ALKPHOS 171*   ALT 7*   AST 13   BILITOT 0.4     Lab Results   Component Value Date    WBC 7.93 08/15/2019    HGB 8.1 (L) 08/15/2019    HCT 26.9 (L) 08/15/2019     (H) 08/15/2019     (L) 08/15/2019     No results for input(s): TSH, FREET4 in the last 168 hours.  Lab Results   Component Value Date    HGBA1C 4.7 01/09/2019       Nutritional status:   Body mass index is 22.26 kg/m².  Lab Results   Component Value Date    ALBUMIN 2.4 (L) 08/15/2019    ALBUMIN 3.0 (L) 08/14/2019    ALBUMIN 3.0 (L) 08/07/2019      Lab Results   Component Value Date    PREALBUMIN 15 (L) 05/29/2014    PREALBUMIN 11 (L) 05/09/2014    PREALBUMIN 18 (L) 03/19/2014       Estimated Creatinine Clearance: 53.7 mL/min (based on SCr of 1.1 mg/dL).    Accu-Checks  Recent Labs     08/15/19  0652 08/15/19  1657   POCTGLUCOSE 142* 394*        ASSESSMENT and PLAN    * Complication of transplanted lung  Managed per primary.       Hyperglycemia  BG goal 140-180.       januvia 100 mg daily restarted per primary.   Start novolog 4 units with meals.   BG monitoring ac/hs and moderate dose correction scale.       Adrenal cortical steroids causing adverse effect in therapeutic use  pulse steroids per primary.   Glucocorticoids markedly increase glucose levels. Expect the steroid taper will help glucose control.         Prophylactic immunotherapy  May increase insulin resistance.         CF related Pancreatic insufficiency  May impact BG           Plan discussed with family and RN at bedside.     Amy Zapata NP  Endocrinology  [facility]

## 2019-08-15 NOTE — HPI
Reason for Consult: Management of  Hyperglycemia     Surgical Procedure and Date: lung transplant in 2014; 6/18/19    Diabetes diagnosis year: borderline GTT  Per previous notes - not on any medication prior to transplant     Home Diabetes Medications:  januvia 100 mg daily after second lung transplant     How often checking glucose at home? Not checking   BG readings on regimen: DAVE   Hypoglycemia on the regimen?  DAVE  Missed doses on regimen?  No    Diabetes Complications include:     None     Complicating diabetes co morbidities:   Glucocorticoid use       HPI:   Patient is a 29 y.o. female with a diagnosis of steroid induced hyperglycemia. Patient presented today for a bronchoscopy.  She is s/p lung re-transplantation for CARLOS EDUARDO.   She reports overall not feeling well for the past few weeks and has been having intermittent fevers with increase in sputum production, and low oxygenation while she sleeps per lung transplant note. Endocrinology consulted for BG management.

## 2019-08-15 NOTE — SUBJECTIVE & OBJECTIVE
Interval HPI:   Remains in [facility]. Drowsy from bronch today. Family at bedside. BG at goal this AM. Above goal on more recent BG check. Solumedrol 500 mg; steroids per primary.   Eating:  Advanced- has not eaten yet   Nausea: No  Hypoglycemia and intervention: No  Fever: No  TPN and/or TF: No    PMH, PSH, FH, SH  reviewed     Review of Systems   Unable to obtain due to: Sedation,Intubation,Altered mental status,Critical illness,Reviewed ROS from note dated 8/15/19 per Chambers KUMAR Will    Current Medications and/or Treatments Impacting Glycemic Control  Immunotherapy:    Immunosuppressants         Stop Route Frequency     tacrolimus capsule 1 mg      -- Oral 2 times daily        Steroids:   Hormones (From admission, onward)    Start     Stop Route Frequency Ordered    08/18/19 0900  predniSONE tablet 15 mg      -- Oral Daily 08/15/19 0828    08/15/19 0800  methylPREDNISolone sodium succinate (SOLU-MEDROL) 500 mg in dextrose 5 % 100 mL IVPB      08/18 0859 IV Daily 08/15/19 0759        Pressors:    Autonomic Drugs (From admission, onward)    None        Hyperglycemia/Diabetes Medications:   Antihyperglycemics (From admission, onward)    Start     Stop Route Frequency Ordered    08/15/19 0930  SITagliptin tablet 100 mg      -- Oral Daily 08/15/19 0828             PHYSICAL EXAMINATION:  Vitals:    08/15/19 1645   BP: 118/82   Pulse: 97   Resp:    Temp: 97.2 °F (36.2 °C)     Body mass index is 22.26 kg/m².    Physical Exam   Constitutional: Well developed, well nourished, NAD.  ENT: External ears no masses with nose patent; normal hearing.  Neck: Supple; trachea midline.  Cardiovascular: Normal heart sounds, no LE edema. DP +2 bilaterally.  Lungs: Normal effort; lungs anterior bilaterally crackles to auscultation.  Abdomen: Soft, no masses, no hernias.  MS: No clubbing or cyanosis of nails noted;  unable to assess gait.  Skin: No rashes, lesions, or ulcers; no nodules.  Psychiatric: DAVE  Neurological: DAVE  Foot: nails in  good condition, no amputations noted.

## 2019-08-15 NOTE — HPI
Juanita Ibarra is a 29 y.o. year old female who presented today for a bronchoscopy.  She is s/p lung re-transplantation for CARLOS EDUARDO.  She was recently started on ceftolozane/tazobactam and micafungin for sputum cultures growing two species of non-albicans Candida and Pseudomonas.  She reports overall not feeling well for the past few weeks and has been having intermittent fevers with increase in sputum production, and low oxygenation while she sleeps. Denies sick contacts and is compliant with her medications.

## 2019-08-15 NOTE — TRANSFER OF CARE
Anesthesia Transfer of Care Note    Patient: Juanita Ibrara    Procedure(s) Performed: Procedure(s) (LRB):  flexible bronchoscopy with tissue biopsy  CPT 24311 (N/A)    Patient location: [facility]    Anesthesia Type: general    Transport from OR: Transported from OR on 6-10 L/min O2 by face mask with adequate spontaneous ventilation    Post pain: adequate analgesia    Post assessment: no apparent anesthetic complications    Post vital signs: stable    Level of consciousness: sedated    Nausea/Vomiting: no nausea/vomiting    Complications: none    Transfer of care protocol was followed      Last vitals:   Visit Vitals  BP (!) 146/96   Pulse (!) 127   Temp 37.3 °C (99.1 °F) (Oral)   Resp (!) 24   Ht 5' (1.524 m)   Wt 51.7 kg (114 lb)   LMP 10/02/2016 (LMP Unknown)   SpO2 (!) 92%   Breastfeeding? No   BMI 22.26 kg/m²

## 2019-08-15 NOTE — ASSESSMENT & PLAN NOTE
pulse steroids per primary.   Glucocorticoids markedly increase glucose levels. Expect the steroid taper will help glucose control.

## 2019-08-15 NOTE — ASSESSMENT & PLAN NOTE
BG goal 140-180.       januvia 100 mg daily restarted per primary.   Start novolog 4 units with meals.   BG monitoring ac/hs and moderate dose correction scale.

## 2019-08-15 NOTE — ASSESSMENT & PLAN NOTE
Will continue home antibiotic of Zerbaxa and antifungal Micafungin.  Bronchoscopy done today, will follow up on BAL and pathology.  Will start pulse steroids prophylactically for rejection.  Oxygen supplementation as needed

## 2019-08-15 NOTE — PROGRESS NOTES
LUNG TRANSPLANT RECIPIENT FOLLOW-UP    Reason for Visit: Follow-up after lung transplantation.                                                                                                         Date of Transplant:   1. 6/12/14  2. 6/18/19                                                                               Reason for Transplant: CARLOS EDUARDO                                                                               Type of Transplant: bilateral sequential lung                                                                               CMV Status: D- / R+     Hepatitis C Status:  Donor Ab:(--)  Donor ANY:(--)  Recipient serostatus:(--)  Treatment status: n/a                                                                              Major Complications:   1. Post-operative pain                                                                               History of Present Illness: Juanita Ibarra is a 29 y.o. year old female who presents to her routine clinic visit after lung re-transplantation for CARLOS EDUARDO. During her clinic visit last week she was started on antibiotics for increase sputum production, shortness of breath, malaise, and abnormal CXR. She was started on ceftolozane/tazobactam and micafungin. Sputum cultures obtained grew tow species of non-albicans Candida and Pseudomonas.     She says that she felt well until last night when she noticed that she was having fevers, increase in sputum production, and low oxygenation while she sleeps. She appears comfortable during the visit but says she overall does not feel well.     Review of Systems   Constitutional: Positive for fever and malaise/fatigue. Negative for chills, diaphoresis and weight loss.   HENT: Negative for congestion, ear discharge, ear pain, hearing loss, nosebleeds and sore throat.    Eyes: Negative for blurred vision, double vision and photophobia.   Respiratory: Positive for cough, sputum production and shortness of breath.  "Negative for hemoptysis and wheezing.    Cardiovascular: Negative for chest pain, palpitations, orthopnea, claudication, leg swelling and PND.   Gastrointestinal: Negative for abdominal pain, blood in stool, constipation, diarrhea, heartburn, melena, nausea and vomiting.   Genitourinary: Negative for dysuria, flank pain, frequency, hematuria and urgency.   Musculoskeletal: Positive for back pain and myalgias. Negative for falls, joint pain and neck pain.   Skin: Negative for itching and rash.   Neurological: Positive for weakness. Negative for dizziness, tremors, sensory change, loss of consciousness and headaches.   Endo/Heme/Allergies: Does not bruise/bleed easily.   Psychiatric/Behavioral: Negative for depression, hallucinations and memory loss. The patient is not nervous/anxious and does not have insomnia.      /72   Pulse (!) 121   Temp 99.2 °F (37.3 °C) (Oral)   Resp 20   Ht 5' 1" (1.549 m)   Wt 52.6 kg (116 lb)   LMP 10/02/2016 (LMP Unknown)   SpO2 (!) 92% Comment: 2 liters  BMI 21.92 kg/m²     Physical Exam   Constitutional: She is oriented to person, place, and time and well-developed, well-nourished, and in no distress. No distress.   HENT:   Head: Normocephalic and atraumatic.   Nose: Nose normal.   Mouth/Throat: Oropharynx is clear and moist. No oropharyngeal exudate.   Eyes: Pupils are equal, round, and reactive to light. Conjunctivae and EOM are normal. No scleral icterus.   Neck: Normal range of motion. Neck supple. No JVD present. No tracheal deviation present. No thyromegaly present.   Cardiovascular: Normal rate, regular rhythm and intact distal pulses. Exam reveals no gallop and no friction rub.   No murmur heard.  Pulmonary/Chest: Effort normal. No stridor. No respiratory distress. She has no wheezes. She has rales in the right middle field and the right lower field. She exhibits no tenderness.   Wound vac in place   Abdominal: Soft. Bowel sounds are normal. She exhibits no " distension and no mass. There is no tenderness. There is no rebound and no guarding.   Musculoskeletal: Normal range of motion. She exhibits no edema.   Lymphadenopathy:     She has no cervical adenopathy.   Neurological: She is alert and oriented to person, place, and time. No cranial nerve deficit. Gait normal. Coordination normal.   Skin: Skin is warm and dry. No rash noted. She is not diaphoretic. No erythema. No pallor.   Clean clamshell incision   Psychiatric: Mood and affect normal.     Labs:  cbc, cmp, tacrolimus Latest Ref Rng & Units 7/31/2019 8/7/2019 8/14/2019   TACROLIMUS LVL 5.0 - 15.0 ng/mL 8.2 8.5 3.2(L)   WHITE BLOOD CELL COUNT 3.90 - 12.70 K/uL 6.74 8.46 10.90   RBC 4.00 - 5.40 M/uL 3.36(L) 3.51(L) 3.57(L)   HEMOGLOBIN 12.0 - 16.0 g/dL 9.8(L) 10.6(L) 10.6(L)   HEMATOCRIT 37.0 - 48.5 % 33.6(L) 35.3(L) 35.1(L)   MCV 82 - 98 fL 100(H) 101(H) 98   MCH 27.0 - 31.0 pg 29.2 30.2 29.7   MCHC 32.0 - 36.0 g/dL 29.2(L) 30.0(L) 30.2(L)   RDW 11.5 - 14.5 % 19.8(H) 18.5(H) 16.7(H)   PLATELETS 150 - 350 K/uL 173 194 201   MPV 9.2 - 12.9 fL 11.5 10.9 11.1   GRAN # 1.8 - 7.7 K/uL 5.3 6.6 9.6(H)   LYMPH # 1.0 - 4.8 K/uL 0.9(L) 1.2 0.9(L)   MONO # 0.3 - 1.0 K/uL 0.2(L) 0.3 0.2(L)   EOSINOPHIL% 0.0 - 8.0 % 1.0 1.7 0.3   BASOPHIL% 0.0 - 1.9 % 0.6 1.3 0.9   DIFFERENTIAL METHOD - Automated Automated Automated   SODIUM 136 - 145 mmol/L 139 138 137   POTASSIUM 3.5 - 5.1 mmol/L 4.6 4.7 4.2   CHLORIDE 95 - 110 mmol/L 107 103 102   CO2 23 - 29 mmol/L 22(L) 25 24   GLUCOSE 70 - 110 mg/dL 156(H) 121(H) 209(H)   BUN BLD 6 - 20 mg/dL 14 16 16   CREATININE 0.5 - 1.4 mg/dL 0.9 1.0 1.1   CALCIUM 8.7 - 10.5 mg/dL 9.2 10.3 9.7   PROTEIN TOTAL 6.0 - 8.4 g/dL 7.0 7.6 7.7   ALBUMIN 3.5 - 5.2 g/dL 3.0(L) 3.0(L) 3.0(L)   BILIRUBIN TOTAL 0.1 - 1.0 mg/dL 0.2 0.3 0.3   ALK PHOS 55 - 135 U/L 178(H) 164(H) 173(H)   AST 10 - 40 U/L 10 10 11   ALT 10 - 44 U/L 8(L) 8(L) 7(L)   ANION GAP 8 - 16 mmol/L 10 10 11   EGFR IF  >60  mL/min/1.73 m:2 >60.0 >60.0 >60.0   EGFR IF NON-AFRICAN AMERICAN >60 mL/min/1.73 m:2 >60.0 >60.0 >60.0     Pulmonary Function Tests 8/14/2019 8/7/2019 7/31/2019 7/19/2019 6/6/2019 5/1/2019 1/9/2019   FVC 1.34 1.22 1.19 1.45 1.42 1.45 1.79   FEV1 0.71 0.69 0.6 1.27 0.59 0.61 0.71   TLC (liters) - - - - - - -   DLCO (ml/mmHg sec) - - - - - - -   FVC% 39.3 35.6 34.7 42.5 44 42.5 56   FEV1% 24.4 23.9 20.7 43.6 21 20.9 25   FEF 25-75 - - - - - 0.24 0.28   FEF 25-75% - - - - - 7.4 8   TLC% - - - - - - -   RV - - - - - - -   RV% - - - - - - -   DLCO% - - - - - - -       Imaging:  Results for orders placed during the hospital encounter of 08/14/19   X-Ray Chest PA And Lateral    Narrative EXAMINATION:  XR CHEST PA AND LATERAL    CLINICAL HISTORY:  Lung transplant status    TECHNIQUE:  PA and lateral views of the chest were performed.    COMPARISON:  No 08/07/2019 ne    FINDINGS:  Postoperative changes and central venous catheter as before.  Ill-defined patchy airspace disease in the right perihilar area and the accentuation of the basilar interstitial markings bilaterally.  The right costophrenic angle is blunted suggesting small amount of pleural effusion.  Heart size normal.  No significant changes.      Impression See above      Electronically signed by: Pradip Cohen MD  Date:    08/14/2019  Time:    10:39     Assessment:  1. Encounter following organ transplant    2. Complication of transplanted lung, unspecified complication    3. LRTI (lower respiratory tract infection)    4. Immunosuppression    5. Prophylactic antibiotic    6. Aspergillosis    7. Debility      Plan:   1. Will obtain CT of chest for better evaluation of her pulmonary process and schedule for bronchoscopy tomorrow. Will continue ceftolozane/tazobactam and micafungin based on most recent respiratory cultures. I still suspect an infectious process and will need to determine if there is any organism not been treated. Also, acute rejection could have been  triggered by an infection.     2. Continue tacrolimus, prednisone. Adjust tacrolimus to keep level between 6-8 for now. Will hold mycophenolate while infection clears.     3. Continue dapsone and valganciclovir.    4. Continue voriconazole for at least three months.    5. Currently having home therapy. Will transition to pulmonary rehab when appropriate.    6. RTC in 1 week

## 2019-08-15 NOTE — PROGRESS NOTES
Anes staff aware that pt had a sip of Dr Pepper at 0515, plan adjusted to start procedure in OR at 7:15.

## 2019-08-15 NOTE — SUBJECTIVE & OBJECTIVE
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 OR    BIOPSY-BRONCHUS N/A 12/20/2016    Performed by Jake Alvarez MD at [facility] OR 2ND FLR    Bronchoscopy N/A 7/5/2019    Performed by Francisca Morin DO at [facility] OR 2ND FLR    BRONCHOSCOPY Bilateral 12/11/2018    Performed by Francisca Morin DO at [facility] OR 2ND FLR    BRONCHOSCOPY N/A 10/1/2018    Performed by Jake Alvarez MD at [facility] OR 2ND FLR    BRONCHOSCOPY Bilateral 12/20/2016    Performed by Jake Alvarez MD at [facility] OR 2ND FLR    BRONCHOSCOPY - flexible bronchoscopy with probable tissue biopsy CPT 42213 N/A 7/21/2015    Performed by Jake Alvarez MD at [facility] OR 2ND FLR    BRONCHOSCOPY - flexible bronchoscopy with probable tissue biospy CPT 23040 N/A 10/20/2015    Performed by Jake Alvarez MD at [facility] OR 2ND FLR    BRONCHOSCOPY - flexible bronchoscopy with tissue biopsy CPT 30730 N/A 9/29/2015    Performed by Jake Alvarez MD at [facility] OR 2ND FLR    BRONCHOSCOPY - flexible bronchoscopy with tissue biopsy CPT 83688 N/A 5/26/2015    Performed by Jake Alvarez MD at [facility] OR 1ST FLR    BRONCHOSCOPY flexible bronchoscopy with tissue biopsy N/A 9/8/2014    Performed by Jake Alvarez MD at [facility] OR 2ND FLR    BRONCHOSCOPY flexible with possible tissue biopsy N/A 8/8/2014    Performed by Jake Alvarez MD at [facility] OR 2ND FLR     BRONCHOSCOPY, BAL, BIOPSIES N/A 3/20/2018    [facility]    BRONCHOSCOPY-FIBEROPTIC N/A 1/29/2016    Performed by Joann Cifuentes DO at [facility] OR 2ND FLR    BRONCHOSCOPY; Bronchoscopy with BAL and transbronchial biopsies under general anesthesia N/A 5/29/2018    Performed by [facility] at [facility]    CHOLECYSTECTOMY  2016    CHOLECYSTECTOMY-LAPAROSCOPIC N/A 7/22/2016    Performed by Joshua Goldberg, [facility] at [facility]    COLONOSCOPY N/A 5/3/2019    Performed by Juancho Rich MD at [facility] ENDO (2ND FLR)    ENDOMETRIAL ABLATION  2015    KJB    ETHMOIDECTOMY Bilateral 4/9/2019    Performed by Adin Burks III, MD at [facility]    FESS      FESS Bilateral 10/21/2013    Performed by Jared Whatley MD at [facility] OR [address] [place] [address]    FESS, USING COMPUTER-ASSISTED NAVIGATION N/A 4/9/2019    Performed by Adin Burks III, MD at [facility] OR [address] [place] [address]    FINE NEEDLE ASPIRATION (FNA)  of a cervical lymphadenopathy  1/29/2016    Performed by Joann Cifuentes DO at [facility] OR [address] [place] [address]    flex bronchoscopy with probable tissue biopsy N/A 7/31/2014    Performed by [facility] Diagnostic Provider at [facility] OR 2ND FLR    flexible bronchoscopy with tissue biopsy N/A 12/11/2014    Performed by Jake Alvarez MD at [facility]    flexible bronchoscopy with tissue biopsy CPT 33180  N/A 7/26/2019    Performed by [facility]varez [facility] at [facility]    HYSTERECTOMY  04/2017    [facility]    INJECTION-VOCAL CORD Left 6/24/2014    Performed by Jared Whatley MD at [facility] OR [address] [place] [address]    DCATLKJBL-QMPQ-U-CATH to right chest and removal of portacath to left chests wall. Bilateral 6/24/2014    Performed by Zhen Dorado MD at [facility] OR 2ND FLR    LARYNX SURGERY  2016    LUNG TRANSPLANT Bilateral 6/12/14    MAXILLARY ANTROSTOMY  4/9/2019    Performed by Adin Burks III, MD at [facility]    MYRINGOTOMY W/ TUBES Right 04/2017    MYRINGOTOMY WITH INSERTION  OF PE TUBES Right 4/15/2017    Performed by Gary Null MD at [facility] OR [facility] FLR    PLACEMENT-TUBE-PEG  5/15/2014    Performed by David Moses MD at [facility] OR [address] [place] [address]    PORTACATH PLACEMENT Right     rt sc    REDO BILATERAL LUNG TRANSPLANT; CLAMSHELL Bilateral 6/18/2019    Performed by Jonathan Newby MD at [facility] OR [address] [place] [address]    REMOVAL-TUBE-PEG  5/15/2014    [facility] at [facility]    RHC for lung re-transplant N/A 1/22/2019    Performed by Laura Rachel MD at [facility] CATH LAB    ROBOTIC ASSISTED LAPAROSCOPIC HYSTERECTOMY N/A 4/25/2017    Performed by Deny Dias Jr., MD at [facility] [place]    SALPINGECTOMY Bilateral 2015    KJB    SALPINGECTOMY-LAPAROSCOPIC Bilateral 5/19/2015    Performed by Deny Dias Jr., MD at [facility] [place]    SINUS SURGERY FUNCTIONAL ENDOSCOPIC WITH NAVIGATION Bilateral 4/3/2017    Performed by Cesar Olsen MD at [facility] OR [address] [place] [address]    SINUS SURGERY FUNCTIONAL ENDOSCOPIC WITH NAVIGATION, 91545, 12391, 57818, 21317 Bilateral 2/5/2015    Performed by Cesar Olsen MD at [facility] OR [address] [place] [address]    SPHENOIDECTOMY Bilateral 4/9/2019    Performed by Adin Burks III, MD at [facility] OR [address] [place] [address]    THYROPLASTY - Medialization laryngoplasty, cricothyroid subluxation, arytenoid repositioning Left 8/2/2016    Performed by Gary uNll MD at [facility] OR [facility] FLR    TRANSPLANT-LUNG Bilateral 6/11/2014    Performed by Adolfo Huston MD at [facility] OR [address] [place] [address]       Review of patient's allergies indicates:   Allergen Reactions    Albuterol Palpitations    Colistin Anaphylaxis    Vancomycin analogues      Infusion reaction that does not resolve with slowing  Pt. States she can tolerate it when given with 50 mg Benadryl and ran over 3 hours    Neupogen [filgrastim] Other (See Comments)     Ostealgia after five daily doses of 300 mcg.      Bactrim [sulfamethoxazole-trimethoprim] Hives    Ceftazidime Hives     Pt stated can tolerate cefapine not ceftazidime     Ceftazidime     Dronabinol Other (See Comments)     Mental changes/hallucinations    Haldol [haloperidol lactate] Other (See Comments)     Seizure like activity    Nsaids (non-steroidal anti-inflammatory drug)      Cannot have due to lung transplant    Adhesive Rash     Cloth tape- please use tegaderm or paper tape    Aztreonam Rash    Ciprofloxacin Nausea And Vomiting     Projectile N/V, per patient.  Unwilling to retry therapy.       PTA Medications   Medication Sig    calcium-vitamin D3 (OS-KATY 500 + D3) 500 mg(1,250mg) -200 unit per tablet Take 1 tablet by mouth 2 (two) times daily with meals.    dapsone 100 MG Tab Take 1 tablet (100 mg total) by mouth once daily.    diphenhydrAMINE (BENADRYL) 50 MG tablet Take 1 tablet (50 mg total) by mouth every 6 (six) hours as needed for Itching.    fexofenadine (ALLEGRA) 180 MG tablet Take 180 mg by mouth once daily.    fluticasone propionate (FLONASE) 50 mcg/actuation nasal spray INSERT 2 SPRAYS IN EACH NOSTRIL DAILY    folic acid (FOLVITE) 1 MG tablet Take 1 tablet (1,000 mcg total) by mouth once daily.    gabapentin (NEURONTIN) 300 MG capsule Take 1 capsule (300 mg total) by mouth 3 (three) times daily.    levalbuterol (XOPENEX) 1.25 mg/3 mL nebulizer solution Take 3 mLs (1.25 mg total) by nebulization every 8 (eight) hours as needed for Wheezing or Shortness of Breath. Rescue    lipase-protease-amylase (CREON) 36,000-114,000- 180,000 unit CpDR Take 4 capsules by mouth 3 (three) times daily with meals. Take 3 with snacks as needed. (Patient taking differently: Take 5 capsules by mouth 3 (three) times daily with meals. )    LORazepam (ATIVAN) 1 MG tablet Take 1 tablet (1 mg total) by mouth every 8 (eight) hours as needed for Anxiety.    magnesium oxide (MAG-OX) 400 mg (241.3 mg magnesium) tablet Take 1 tablet (400 mg total) by mouth 3 (three) times daily.    metoprolol tartrate (LOPRESSOR) 25 MG tablet Take 1 tablet (25 mg total) by mouth 2 (two) times  daily.    montelukast (SINGULAIR) 10 mg tablet Take 1 tablet (10 mg total) by mouth once daily.    morphine (MS CONTIN) 15 MG 12 hr tablet Take 1 tablet by mouth twice daily.    multivit,min52-folic-vitK-cQ10 (AQUADEKS) 100-700-10 mcg-mcg-mg Cap cap 1 tab twice daily    omeprazole (PRILOSEC) 40 MG capsule Take 1 capsule (40 mg total) by mouth once daily.    ondansetron (ZOFRAN) 8 MG tablet Take 1 tablet (8 mg total) by mouth every 12 (twelve) hours as needed for Nausea.    oxyCODONE (ROXICODONE) 5 MG immediate release tablet Take 1 tablet by mouth four times daily    predniSONE (DELTASONE) 5 MG tablet Take 3 tablets (15 mg total) by mouth once daily.    QUEtiapine (SEROQUEL) 25 MG Tab Take 1 tablet by mouth in morning and 2 tablets at bedtime    SITagliptin (JANUVIA) 100 MG Tab Take 1 tablet (100 mg total) by mouth once daily.    tacrolimus (PROGRAF) 1 MG Cap Take 1 capsule (1 mg total) by mouth every 12 (twelve) hours.    tiZANidine (ZANAFLEX) 4 MG tablet Take 1 tablet (4 mg total) by mouth 2 (two) times daily.    topiramate (TOPAMAX) 25 MG tablet Take 1 tablet (25 mg total) by mouth 2 (two) times daily    valGANciclovir (VALCYTE) 450 mg Tab Take 1 tablet (450 mg total) by mouth 2 (two) times daily.    venlafaxine (EFFEXOR-XR) 150 MG Cp24 Take 1 capsule (150 mg total) by mouth once daily.    voriconazole (VFEND) 200 MG Tab Take 1 tablet (200 mg total) by mouth 2 (two) times daily.    hydrocortisone 1 % cream Apply topically 2 (two) times daily.    inhalation spacing device (PROCHAMBER) USE AS DIRECTED    polyethylene glycol (GLYCOLAX) 17 gram PwPk Take 17 g by mouth 2 (two) times daily.    venlafaxine (EFFEXOR-XR) 37.5 MG 24 hr capsule Take 1 capsule (37.5 mg total) by mouth once daily.     Family History     Problem Relation (Age of Onset)    Cancer Maternal Grandmother    Diabetes Maternal Grandfather    Drug abuse Maternal Grandfather    Hypertension Maternal Grandmother    Thrombocytopenia  Maternal Grandfather        Tobacco Use    Smoking status: Never Smoker    Smokeless tobacco: Never Used   Substance and Sexual Activity    Alcohol use: No     Comment: Has not had an alcoholic drink in more than 2 months.    Drug use: No    Sexual activity: Yes     Partners: Male     Birth control/protection: See Surgical Hx     Comment: current partner since Oct 2016     Review of Systems   Constitutional: Positive for chills, fatigue and fever. Negative for activity change, appetite change and unexpected weight change.   HENT: Negative for congestion, postnasal drip, rhinorrhea, sinus pressure, sneezing, sore throat, trouble swallowing and voice change.    Eyes: Negative for photophobia and visual disturbance.   Respiratory: Positive for cough and shortness of breath. Negative for apnea, chest tightness, wheezing and stridor.    Cardiovascular: Negative for chest pain, palpitations and leg swelling.   Gastrointestinal: Negative for abdominal distention, abdominal pain, constipation, diarrhea, nausea and vomiting.   Endocrine: Negative for polydipsia, polyphagia and polyuria.   Genitourinary: Negative for dysuria, flank pain, frequency and hematuria.   Musculoskeletal: Positive for back pain (chronic) and myalgias (chronic). Negative for arthralgias and joint swelling.   Skin: Negative for color change, pallor and rash.   Allergic/Immunologic: Positive for immunocompromised state.   Neurological: Positive for weakness. Negative for dizziness, tremors, seizures, syncope, speech difficulty, light-headedness, numbness and headaches.   Hematological: Does not bruise/bleed easily.   Psychiatric/Behavioral: Negative for agitation, confusion, decreased concentration, hallucinations, self-injury, sleep disturbance and suicidal ideas. The patient is not nervous/anxious.      Objective:     Vital Signs (Most Recent):  Temp: 97.8 °F (36.6 °C) (08/15/19 0800)  Pulse: (!) 118 (08/15/19 1245)  Resp: (!) 39 (08/15/19  1245)  BP: (!) 147/101 (08/15/19 1200)  SpO2: (!) 4 % (08/15/19 1245) Vital Signs (24h Range):  Temp:  [97.8 °F (36.6 °C)-99.1 °F (37.3 °C)] 97.8 °F (36.6 °C)  Pulse:  [113-127] 118  Resp:  [14-46] 39  SpO2:  [4 %-96 %] 4 %  BP: (113-147)/() 147/101     Weight: 51.7 kg (114 lb)  Body mass index is 22.26 kg/m².      Intake/Output Summary (Last 24 hours) at 8/15/2019 1613  Last data filed at 8/15/2019 1100  Gross per 24 hour   Intake 420 ml   Output 300 ml   Net 120 ml       Physical Exam   Constitutional: She is oriented to person, place, and time. She appears well-developed and well-nourished.   HENT:   Head: Normocephalic and atraumatic.   Eyes: Conjunctivae and EOM are normal.   Neck: Normal range of motion.   Cardiovascular: Normal rate and regular rhythm.   Pulmonary/Chest: She has rales in the right middle field and the right lower field.   Wound vac in place   Abdominal: Soft.   Musculoskeletal: Normal range of motion.   Neurological: She is alert and oriented to person, place, and time.   Skin: Skin is warm and dry.   Psychiatric: She has a normal mood and affect.       Significant Labs:  CBC:  Recent Labs   Lab 08/15/19  1130   WBC 7.93   RBC 2.55*   HGB 8.1*   HCT 26.9*   *   *   MCH 31.8*   MCHC 30.1*     BMP:  Recent Labs   Lab 08/15/19  1046      K 4.7      CO2 24   BUN 23*   CREATININE 1.1   CALCIUM 9.5        I have reviewed all pertinent labs within the past 24 hours.    Diagnostic Results:  X-Ray: Reviewed  Ill-defined patchy airspace consolidation bilaterally more marked on the right side.  There is also blunting of the right costophrenic angle suggesting pleural effusion.  No extrapulmonary air identified.

## 2019-08-16 PROBLEM — T81.89XA DELAYED SURGICAL WOUND HEALING: Status: ACTIVE | Noted: 2019-01-01

## 2019-08-16 NOTE — PROGRESS NOTES
[facility]  Lung Transplant  Progress Note - Floor    Patient Name: Juanita Ibarra  MRN: [id number]  Admission Date: 8/15/2019  Hospital Length of Stay: 1 days  Post-Operative Day: 1891 (Lung), 58 (Lung)  Attending Physician: Francisca Morin DO  Primary Care Provider: Primary Doctor Beth     Subjective:     Interval History: No acute events overnight. Cough remains non-productive. Still with ongoing fatigue and weakness.     Continuous Infusions:   sodium chloride 0.9% 25 mL/hr (08/15/19 1009)     Scheduled Meds:   ceftolozane-tazobactam  1,500 mg Intravenous Q8H    dapsone  100 mg Oral Daily    enoxaparin  40 mg Subcutaneous Daily    fluticasone propionate  2 spray Each Nostril Daily    gabapentin  300 mg Oral TID    insulin aspart U-100  4 Units Subcutaneous TIDWM    magnesium oxide  400 mg Oral TID    methylPREDNISolone (SOLU-Medrol) IVPB (doses > 250 mg)   Intravenous Daily    metoprolol tartrate  25 mg Oral BID    micafungin (MYCAMINE) IVPB  100 mg Intravenous Q24H    morphine  15 mg Oral Q12H    pantoprazole  40 mg Oral Daily    polyethylene glycol  17 g Oral BID    [START ON 8/18/2019] predniSONE  15 mg Oral Daily    QUEtiapine  25 mg Oral Daily    QUEtiapine  50 mg Oral QHS    SITagliptin  100 mg Oral Daily    tacrolimus  1 mg Oral BID    tiZANidine  4 mg Oral BID    valGANciclovir  450 mg Oral BID    venlafaxine  150 mg Oral QHS    voriconazole  200 mg Oral BID     PRN Meds:Dextrose 10% Bolus, Dextrose 10% Bolus, diphenhydrAMINE, glucagon (human recombinant), glucose, glucose, insulin aspart U-100, levalbuterol, LORazepam, magnesium sulfate IVPB **AND** magnesium sulfate IVPB, ondansetron, oxyCODONE, potassium chloride 10% **AND** potassium chloride 10% **AND** potassium chloride 10%    Review of patient's allergies indicates:   Allergen Reactions    Albuterol Palpitations    Colistin Anaphylaxis    Vancomycin analogues      Infusion reaction that  does not resolve with slowing  Pt. States she can tolerate it when given with 50 mg Benadryl and ran over 3 hours    Neupogen [filgrastim] Other (See Comments)     Ostealgia after five daily doses of 300 mcg.      Bactrim [sulfamethoxazole-trimethoprim] Hives    Ceftazidime Hives     Pt stated can tolerate cefapine not ceftazidime    Ceftazidime     Dronabinol Other (See Comments)     Mental changes/hallucinations    Haldol [haloperidol lactate] Other (See Comments)     Seizure like activity    Nsaids (non-steroidal anti-inflammatory drug)      Cannot have due to lung transplant    Adhesive Rash     Cloth tape- please use tegaderm or paper tape    Aztreonam Rash    Ciprofloxacin Nausea And Vomiting     Projectile N/V, per patient.  Unwilling to retry therapy.       Review of Systems   Constitutional: Positive for chills and fatigue. Negative for activity change, appetite change, fever and unexpected weight change.   HENT: Negative for congestion, postnasal drip, rhinorrhea, sinus pressure, sneezing, sore throat, trouble swallowing and voice change.    Eyes: Negative for photophobia and visual disturbance.   Respiratory: Positive for cough and shortness of breath. Negative for apnea, chest tightness, wheezing and stridor.    Cardiovascular: Negative for chest pain, palpitations and leg swelling.   Gastrointestinal: Negative for abdominal distention, abdominal pain, constipation, diarrhea, nausea and vomiting.   Endocrine: Negative for polydipsia, polyphagia and polyuria.   Genitourinary: Negative for dysuria, flank pain, frequency and hematuria.   Musculoskeletal: Positive for back pain (chronic) and myalgias (chronic). Negative for arthralgias and joint swelling.   Skin: Negative for color change, pallor and rash.   Allergic/Immunologic: Positive for immunocompromised state.   Neurological: Positive for weakness. Negative for dizziness, tremors, seizures, syncope, speech difficulty, light-headedness,  numbness and headaches.   Hematological: Does not bruise/bleed easily.   Psychiatric/Behavioral: Negative for agitation, confusion, decreased concentration, hallucinations, self-injury, sleep disturbance and suicidal ideas. The patient is not nervous/anxious.      Objective:   Physical Exam   Constitutional: She is oriented to person, place, and time. She appears well-developed and well-nourished. Nasal cannula in place.   HENT:   Head: Normocephalic and atraumatic.   Nose: Nose normal.   Eyes: Conjunctivae and EOM are normal.   Neck: Normal range of motion. Neck supple. No JVD present.   Cardiovascular: Normal rate and regular rhythm.   Pulmonary/Chest: She has no decreased breath sounds. She has no wheezes. She has rhonchi (diffuse). She has no rales.   Wound vac in place   Abdominal: Soft. Bowel sounds are normal. She exhibits no distension. There is no tenderness.   Musculoskeletal: Normal range of motion. She exhibits no edema or deformity.   Neurological: She is alert and oriented to person, place, and time.   Skin: Skin is warm and dry.   Psychiatric: She has a normal mood and affect. Her behavior is normal.   Nursing note and vitals reviewed.        Vital Signs (Most Recent):  Temp: 97.6 °F (36.4 °C) (08/16/19 1228)  Pulse: 81 (08/16/19 1228)  Resp: 18 (08/16/19 1228)  BP: 138/86 (08/16/19 1228)  SpO2: (!) 93 % (08/16/19 1228) Vital Signs (24h Range):  Temp:  [97.2 °F (36.2 °C)-98.1 °F (36.7 °C)] 97.6 °F (36.4 °C)  Pulse:  [81-98] 81  Resp:  [16-22] 18  SpO2:  [92 %-96 %] 93 %  BP: (118-143)/(82-97) 138/86     Weight: 51.7 kg (113 lb 15.6 oz)  Body mass index is 22.26 kg/m².    No intake or output data in the 24 hours ending 08/16/19 1532    Significant Labs:  CBC:  Recent Labs   Lab 08/16/19  0359   WBC 4.40   RBC 2.39*   HGB 7.7*   HCT 24.6*   *   *   MCH 32.2*   MCHC 31.3*     BMP:  Recent Labs   Lab 08/16/19  0359      K 4.7      CO2 21*   BUN 18   CREATININE 0.9   CALCIUM 8.3*       Tacrolimus Levels:  Recent Labs   Lab 08/16/19  0359   TACROLIMUS 5.3     Microbiology:  Microbiology Results (last 7 days)     Procedure Component Value Units Date/Time    AFB Culture & Smear [[id number]] Collected:  08/15/19 0800    Order Status:  Completed Specimen:  Respiratory from BAL, RML Updated:  08/16/19 1416     AFB Culture & Smear Culture in progress     AFB CULTURE STAIN No acid fast bacilli seen.    Narrative:       Bronchial Wash    Culture, Respiratory [[id number]] Collected:  08/15/19 0800    Order Status:  Completed Specimen:  Respiratory from BAL, RML Updated:  08/16/19 0750     Respiratory Culture No Growth     Gram Stain (Respiratory) <10 epithelial cells per low power field.     Gram Stain (Respiratory) Many WBC's     Gram Stain (Respiratory) No organisms seen    Narrative:       Bronchial Wash    Fungus culture [[id number]] Collected:  08/15/19 0800    Order Status:  Sent Specimen:  Respiratory from BAL, RML Updated:  08/15/19 0816          I have reviewed all pertinent labs within the past 24 hours.    Diagnostic Results:  Labs: Reviewed  X-Ray: Reviewed      Assessment/Plan:     * Complication of transplanted lung  Underwent re-transplant for CARLOS EDUARDO on 6/18/19. Will continue home antibiotic of Zerbaxa and antifungal Micafungin. Candida glabrata and pseudomonas from respiratory culture, susceptibilities pending. BAL and path from 8/15 bronchoscopy pending. Continue empiric pulse steroids. Maintain O2 sats >88%. Continue immunosuppression and prophylaxis.     Immunosuppression  Continue prednisone and tacrolimus.  Will monitor tacrolimus levels daily while inpatient.    Prophylactic antibiotic  Continue Valcyte and dapsone    Chronic pain with opiate use  Will continue home medications of MS Contin and oxycodone     CF related Pancreatic insufficiency  Continue pancreatic enzymes with meals and snacks.     Adrenal cortical steroids causing adverse effect in therapeutic use  Endocrine consulted,  appreciate recs.         Shama Canales PA-C  Lung Transplant  [facility]

## 2019-08-16 NOTE — ASSESSMENT & PLAN NOTE
BG goal 140-180.       januvia 100 mg daily restarted per primary.   Start novolog 4 units with meals and prn evening meal (patient skips breakfast most days, eats late lunch, and dinner late).   BG monitoring ac/hs and moderate dose correction scale.

## 2019-08-16 NOTE — SUBJECTIVE & OBJECTIVE
Subjective:     Interval History: No acute events overnight. Cough remains non-productive. Still with ongoing fatigue and weakness.     Continuous Infusions:   sodium chloride 0.9% 25 mL/hr (08/15/19 1009)     Scheduled Meds:   ceftolozane-tazobactam  1,500 mg Intravenous Q8H    dapsone  100 mg Oral Daily    enoxaparin  40 mg Subcutaneous Daily    fluticasone propionate  2 spray Each Nostril Daily    gabapentin  300 mg Oral TID    insulin aspart U-100  4 Units Subcutaneous TIDWM    magnesium oxide  400 mg Oral TID    methylPREDNISolone (SOLU-Medrol) IVPB (doses > 250 mg)   Intravenous Daily    metoprolol tartrate  25 mg Oral BID    micafungin (MYCAMINE) IVPB  100 mg Intravenous Q24H    morphine  15 mg Oral Q12H    pantoprazole  40 mg Oral Daily    polyethylene glycol  17 g Oral BID    [START ON 8/18/2019] predniSONE  15 mg Oral Daily    QUEtiapine  25 mg Oral Daily    QUEtiapine  50 mg Oral QHS    SITagliptin  100 mg Oral Daily    tacrolimus  1 mg Oral BID    tiZANidine  4 mg Oral BID    valGANciclovir  450 mg Oral BID    venlafaxine  150 mg Oral QHS    voriconazole  200 mg Oral BID     PRN Meds:Dextrose 10% Bolus, Dextrose 10% Bolus, diphenhydrAMINE, glucagon (human recombinant), glucose, glucose, insulin aspart U-100, levalbuterol, LORazepam, magnesium sulfate IVPB **AND** magnesium sulfate IVPB, ondansetron, oxyCODONE, potassium chloride 10% **AND** potassium chloride 10% **AND** potassium chloride 10%    Review of patient's allergies indicates:   Allergen Reactions    Albuterol Palpitations    Colistin Anaphylaxis    Vancomycin analogues      Infusion reaction that does not resolve with slowing  Pt. States she can tolerate it when given with 50 mg Benadryl and ran over 3 hours    Neupogen [filgrastim] Other (See Comments)     Ostealgia after five daily doses of 300 mcg.      Bactrim [sulfamethoxazole-trimethoprim] Hives    Ceftazidime Hives     Pt stated can tolerate cefapine not  ceftazidime    Ceftazidime     Dronabinol Other (See Comments)     Mental changes/hallucinations    Haldol [haloperidol lactate] Other (See Comments)     Seizure like activity    Nsaids (non-steroidal anti-inflammatory drug)      Cannot have due to lung transplant    Adhesive Rash     Cloth tape- please use tegaderm or paper tape    Aztreonam Rash    Ciprofloxacin Nausea And Vomiting     Projectile N/V, per patient.  Unwilling to retry therapy.       Review of Systems   Constitutional: Positive for chills and fatigue. Negative for activity change, appetite change, fever and unexpected weight change.   HENT: Negative for congestion, postnasal drip, rhinorrhea, sinus pressure, sneezing, sore throat, trouble swallowing and voice change.    Eyes: Negative for photophobia and visual disturbance.   Respiratory: Positive for cough and shortness of breath. Negative for apnea, chest tightness, wheezing and stridor.    Cardiovascular: Negative for chest pain, palpitations and leg swelling.   Gastrointestinal: Negative for abdominal distention, abdominal pain, constipation, diarrhea, nausea and vomiting.   Endocrine: Negative for polydipsia, polyphagia and polyuria.   Genitourinary: Negative for dysuria, flank pain, frequency and hematuria.   Musculoskeletal: Positive for back pain (chronic) and myalgias (chronic). Negative for arthralgias and joint swelling.   Skin: Negative for color change, pallor and rash.   Allergic/Immunologic: Positive for immunocompromised state.   Neurological: Positive for weakness. Negative for dizziness, tremors, seizures, syncope, speech difficulty, light-headedness, numbness and headaches.   Hematological: Does not bruise/bleed easily.   Psychiatric/Behavioral: Negative for agitation, confusion, decreased concentration, hallucinations, self-injury, sleep disturbance and suicidal ideas. The patient is not nervous/anxious.      Objective:   Physical Exam   Constitutional: She is oriented to  person, place, and time. She appears well-developed and well-nourished. Nasal cannula in place.   HENT:   Head: Normocephalic and atraumatic.   Nose: Nose normal.   Eyes: Conjunctivae and EOM are normal.   Neck: Normal range of motion. Neck supple. No JVD present.   Cardiovascular: Normal rate and regular rhythm.   Pulmonary/Chest: She has no decreased breath sounds. She has no wheezes. She has rhonchi (diffuse). She has no rales.   Wound vac in place   Abdominal: Soft. Bowel sounds are normal. She exhibits no distension. There is no tenderness.   Musculoskeletal: Normal range of motion. She exhibits no edema or deformity.   Neurological: She is alert and oriented to person, place, and time.   Skin: Skin is warm and dry.   Psychiatric: She has a normal mood and affect. Her behavior is normal.   Nursing note and vitals reviewed.        Vital Signs (Most Recent):  Temp: 97.6 °F (36.4 °C) (08/16/19 1228)  Pulse: 81 (08/16/19 1228)  Resp: 18 (08/16/19 1228)  BP: 138/86 (08/16/19 1228)  SpO2: (!) 93 % (08/16/19 1228) Vital Signs (24h Range):  Temp:  [97.2 °F (36.2 °C)-98.1 °F (36.7 °C)] 97.6 °F (36.4 °C)  Pulse:  [81-98] 81  Resp:  [16-22] 18  SpO2:  [92 %-96 %] 93 %  BP: (118-143)/(82-97) 138/86     Weight: 51.7 kg (113 lb 15.6 oz)  Body mass index is 22.26 kg/m².    No intake or output data in the 24 hours ending 08/16/19 1532    Significant Labs:  CBC:  Recent Labs   Lab 08/16/19 0359   WBC 4.40   RBC 2.39*   HGB 7.7*   HCT 24.6*   *   *   MCH 32.2*   MCHC 31.3*     BMP:  Recent Labs   Lab 08/16/19 0359      K 4.7      CO2 21*   BUN 18   CREATININE 0.9   CALCIUM 8.3*      Tacrolimus Levels:  Recent Labs   Lab 08/16/19 0359   TACROLIMUS 5.3     Microbiology:  Microbiology Results (last 7 days)     Procedure Component Value Units Date/Time    AFB Culture & Smear [[id number]] Collected:  08/15/19 0800    Order Status:  Completed Specimen:  Respiratory from BAL, R[facility] Updated:  08/16/19 1410      AFB Culture & Smear Culture in progress     AFB CULTURE STAIN No acid fast bacilli seen.    Narrative:       Bronchial Wash    Culture, Respiratory [[id number]] Collected:  08/15/19 0800    Order Status:  Completed Specimen:  Respiratory from [facility] Updated:  08/16/19 0750     Respiratory Culture No Growth     Gram Stain (Respiratory) <10 epithelial cells per low power field.     Gram Stain (Respiratory) Many WBC's     Gram Stain (Respiratory) No organisms seen    Narrative:       Bronchial Wash    Fungus culture [[id number]] Collected:  08/15/19 0800    Order Status:  Sent Specimen:  Respiratory from [facility] Updated:  08/15/19 0816          I have reviewed all pertinent labs within the past 24 hours.    Diagnostic Results:  Labs: Reviewed  X-Ray: Reviewed

## 2019-08-16 NOTE — PT/OT/SLP EVAL
"Physical Therapy Evaluation    Patient Name:  Juanita Ibarra   MRN:  [id number]    Recommendations:     Discharge Recommendations:  outpatient PT   Discharge Equipment Recommendations: none   Barriers to discharge: None    Assessment:     Juanita Ibarra is a 29 y.o. female admitted with a medical diagnosis of Complication of transplanted lung.  She presents with the following impairments/functional limitations:  weakness, impaired endurance, impaired self care skills, impaired functional mobilty, gait instability, impaired balance, pain, impaired cardiopulmonary response to activity.    Pt is s/p bronchoscopy with tissue biopsy, as of 8/15/19.  Pt refused gait training at time of eval.  Inhibited by back pain, which was addressed manually through sustained pressure to the L thoracic PVM.     Rehab Prognosis: Good; patient would benefit from acute skilled PT services to address these deficits and reach maximum level of function.    Recent Surgery: Procedure(s) (LRB):  flexible bronchoscopy with tissue biopsy  CPT 27980 (N/A) 1 Day Post-Op    Plan:     During this hospitalization, patient to be seen 3 x/week to address the identified rehab impairments via therapeutic activities, gait training, therapeutic exercises, neuromuscular re-education and progress toward the following goals:    · Plan of Care Expires:  09/12/19    Subjective     Chief Complaint: Back pain  Patient/Family Comments/goals: "Not great."  Pain/Comfort:  · Pain Rating 1: 8/10  · Location 1: back  · Pain Addressed 1: Pre-medicate for activity, Reposition(Manual therapy)    Patients cultural, spiritual, Worship conflicts given the current situation: no    Living Environment:  Pt lives with spouse, SSH, 1 threshold to enter.   Prior to admission, patients level of function was I with all functional mobility and ADLs.  Equipment used at home: wheelchair, oxygen.  DME owned (not currently used): none.  Upon discharge, patient will " have assistance from spouse.    Objective:     Communicated with nursing prior to session.  Patient found supine with telemetry, wound vac(port)  upon PT entry to room.    General Precautions: Standard, fall   Orthopedic Precautions:N/A   Braces: N/A     Exams:  · Cognitive Exam:  Patient is oriented to Person, Place, Time and Situation  · Fine Motor Coordination:    · -       Intact  Left hand thumb/finger opposition skills and Right hand thumb/finger opposition skills  · Gross Motor Coordination:  WFL  · Postural Exam:  Patient presented with the following abnormalities:    · -       No postural abnormalities identified  · Sensation:    · -       Intact  · Skin Integrity/Edema:      · -       Edema: None noted B LEs  · RUE ROM: WFL  · RUE Strength: WFL  · LUE ROM: WFL  · LUE Strength: WFL  · RLE ROM: WFL  · RLE Strength: WFL  · LLE ROM: WFL  · LLE Strength: WFL    Functional Mobility:  · Bed Mobility:     · Rolling Left:  independence  · Rolling Right: independence  · Scooting: independence  · Supine to Sit: independence  · Sit to Supine: independence  · Transfers:     · Sit to Stand:  contact guard assistance with ed on hand placement  · Balance: I: dyanmic sitting balance EOB      Therapeutic Activities and Exercises:   Whiteboard updated  Manual therapy: sustained pressure, mod-deep tissue depth, thoracic PVM, L side    AM-PAC 6 CLICK MOBILITY  Total Score:20     Patient left supine with all lines intact, call button in reach and spouse present.    GOALS:   Multidisciplinary Problems     Physical Therapy Goals        Problem: Physical Therapy Goal    Goal Priority Disciplines Outcome Goal Variances Interventions   Physical Therapy Goal     PT, PT/OT Ongoing (interventions implemented as appropriate)     Description:  Goals to be met by: 19     Patient will increase functional independence with mobility by performin. Sit to stand transfer with Supervision from all surfaces.  2. Bed to chair transfer  with Contact Guard Assistance.  3. Gait  x 150 feet with Contact Guard Assistance.   4. Ascend/Descend 6 inch curb step with Contact Guard Assistance.  5. Lower extremity exercise program x 20 reps per handout, with assistance as needed.                      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       Past Surgical History:   Procedure Laterality Date    ABDOMINAL SURGERY      peg tube     [id number] N/A 5/19/2015    Per[facility] OR    BIOPSY-BRONCHUS N/A 12/20/2016    Performed by [facility] at [facility] OR 2ND FLR    Bronchoscopy N/A 7/5/2019    Performed by Francisca Morin DO at [facility] OR 2ND [facility]LR    BRONCHOSCOPY Bilateral 12/11/2018    Performed by [facility]isc[facility] DO at [facility] OR 2ND FLR    BRONCHOSCOPY N/A 10/1/2018    Performed by [facility] at [facility] OR 2ND FLR    BRONCHOSCOPY Bilateral 12/20/2016    Performed by [facility] at [facility] OR 2ND [facility]LR    BRONCHOSCOPY - flexible bronchoscopy with probable tissue biopsy CPT 73425 N/A 7/21/2015    Performed by [facility] at [facility] OR 2ND [facility]LR    BRONCHOSCOPY - flexible bronchoscopy with probable tissue biospy CPT 05571 N/A 10/20/2015    Performed by [facility] MD at [facility] OR 2ND FLR    BRONCHOSCOPY - flexible bronchoscopy with tissue biopsy CPT 31883 N/A 9/29/2015    Performed by [facility] MD at [facility] OR 2ND [facility]LR    BRONCHOSCOPY - flexible bronchoscopy with tissue biopsy CPT 85699 N/A 5/26/2015    Performed by Jake Alvarez MD  at [facility] OR 1ST FLR    BRONCHOSCOPY flexible bronchoscopy with tissue biopsy N/A 9/8/2014    Performed by Jake Alvarez MD at [facility] OR 2ND FLR    BRONCHOSCOPY flexible with possible tissue biopsy N/A 8/8/2014    Performed by Jake Alvarez MD at [facility] OR 2ND FLR    BRONCHOSCOPY, BAL, BIOPSIES N/A 3/20/2018    Performed by Jake Alvarez MD at [facility] OR 2ND FLR    BRONCHOSCOPY-FIBEROPTIC N/A 1/29/2016    Performed by Joann Cifuentes DO at [facility] OR 2ND FLR    BRONCHOSCOPY; Bronchoscopy with BAL and transbronchial biopsies under general anesthesia N/A 5/29/2018    Performed by Jake Alvarez MD at [facility] OR 2ND FLR    CHOLECYSTECTOMY  2016    CHOLECYSTECTOMY-LAPAROSCOPIC N/A 7/22/2016    Performed by Joshua Goldberg, MD at [facility] OR 2ND FLR    COLONOSCOPY N/A 5/3/2019    Performed by Juancho Rich MD at [facility] ENDO (2ND FLR)    ENDOMETRIAL ABLATION  2015    KJB    ETHMOIDECTOMY Bilateral 4/9/2019    Performed by Adin Burks III, MD at [facility] OR 2ND FLR    FESS      FESS Bilateral 10/21/2013    Performed by Jared Whatley MD at [facility] OR 2ND FLR    FESS, USING COMPUTER-ASSISTED NAVIGATION N/A 4/9/2019    Performed by Adin Burks III, MD at [facility] OR 2ND FLR    FINE NEEDLE ASPIRATION (FNA)  of a cervical lymphadenopathy  1/29/2016    Performed by Joann Cifuentes DO at [facility] OR 2ND FLR    flex bronchoscopy with probable tissue biopsy N/A 7/31/2014    Performed by [facility] Diagnostic Provider at [facility] OR 2ND FLR    flexible bronchoscopy with tissue biopsy N/A 12/11/2014    Performed by Jake Alvarez MD at [facility] OR 2ND FLR    flexible bronchoscopy with tissue biopsy  CPT 82345 N/A 8/15/2019    Performed by Jake Alvarez MD at [facility] OR 2ND FLR    flexible bronchoscopy with tissue biopsy CPT 61486  N/A 7/26/2019    Performed by Jake Alvarez MD at [facility] OR 2ND FLR    HYSTERECTOMY  04/2017    [facility]    INJECTION-VOCAL CORD Left 6/24/2014    Performed by Jared Whatley MD at [facility] OR  2ND FLR    LLPVADOQZ-VZJD-P-CATH to right chest and removal of portacath to left chests wall. Bilateral 6/24/2014    Performed by [facility]hen Dorado [facility] at [facility] 2ND FLR    LARYNX SURGERY  2016    LUNG TRANSPLANT Bilateral 6/12/14    MAXILLARY ANTROSTOMY  4/9/2019    Performed by Adin Burks III, MD at [facility] 2ND FLR    MYRINGOTOMY W/ TUBES Right 04/2017    MYRINGOTOMY WITH INSERTION OF PE TUBES Right 4/15/2017    Performed by Gary Null MD at [facility] OR 2ND FLR    PLACEMENT-TUBE-PEG  5/15/2014    Performed by David Moses MD at [facility] OR 2ND FLR    PORTACATH PLACEMENT Right     rt sc    REDO BILATERAL LUNG TRANSPLANT; CLAMSHELL Bilateral 6/18/2019    Performed by Jonathan Newby MD at [facility] OR 2ND FLR    REMOVAL-TUBE-PEG  5/15/2014    Performed by David Moses MD at [facility] OR 2ND FLR    RHC for lung re-transplant N/A 1/22/2019    Performed by Laura Rachel MD at [facility] CATH LAB    ROBOTIC ASSISTED LAPAROSCOPIC HYSTERECTOMY N/A 4/25/2017    Performed by [facility]y Dias Jr[facility] MD at [facility] OR    SALPINGECTOMY Bilateral 2015    [facility]    SALPINGECTOMY-LAPAROSCOPIC Bilateral 5/19/2015    Performed by Deny Dias Jr., MD at [facility] OR    SINUS SURGERY FUNCTIONAL ENDOSCOPIC WITH NAVIGATION Bilateral 4/3/2017    [facility]    SINUS SURGERY FUNCTIONAL ENDOSCOPIC WITH NAVIGATION, 68188, 07479, 98710, 42086 Bilateral 2/5/2015    Performed by Cesar Olsen MD at [facility] OR 2ND FLR    SPHENOIDECTOMY Bilateral 4/9/2019    Performed by Adin Burks III, MD at [facility] OR 2ND FLR    THYROPLASTY - Medialization laryngoplasty, cricothyroid subluxation, arytenoid repositioning Left 8/2/2016    Performed by Gary Null MD at [facility] OR 2ND FLR    TRANSPLANT-LUNG Bilateral 6/11/2014    Performed by Adolfo Huston MD at [facility] [place] [facility] [place]       Time Tracking:     PT Received On: 08/16/19  PT Start Time: 1240     PT Stop Time: 1303  PT Total Time (min):  23 min     Billable Minutes: Evaluation 10 and Therapeutic Activity 8      Alicia Jones, PT  08/16/2019

## 2019-08-16 NOTE — PROGRESS NOTES
SW attempted to meet with pt at 0900 in order to address any needs. Pt still sleeping with sleeping mask on and blinds closed. SW attempted to wake, however pt stated it was too early. SW to return at a later time/date.

## 2019-08-16 NOTE — ASSESSMENT & PLAN NOTE
Underwent re-transplant for CARLOS EDUARDO on 6/18/19. Will continue home antibiotic of Zerbaxa and antifungal Micafungin. Candida glabrata and pseudomonas from respiratory culture, susceptibilities pending. BAL and path from 8/15 bronchoscopy pending. Continue empiric pulse steroids. Maintain O2 sats >88%. Continue immunosuppression and prophylaxis.

## 2019-08-16 NOTE — PROGRESS NOTES
[facility]  Endocrinology  Progress Note    Admit Date: 8/15/2019     Reason for Consult: Management of  Hyperglycemia     Surgical Procedure and Date: lung transplant in ; 19    Diabetes diagnosis year: borderline GTT  Per previous notes - not on any medication prior to transplant     Home Diabetes Medications:  januvia 100 mg daily after second lung transplant     How often checking glucose at home? Not checking   BG readings on regimen: DAVE   Hypoglycemia on the regimen?  DAVE  Missed doses on regimen?  No    Diabetes Complications include:     None     Complicating diabetes co morbidities:   Glucocorticoid use       HPI:   Patient is a 29 y.o. female with a diagnosis of steroid induced hyperglycemia. Patient presented today for a bronchoscopy.  She is s/p lung re-transplantation for CRALOS EDUARDO.   She reports overall not feeling well for the past few weeks and has been having intermittent fevers with increase in sputum production, and low oxygenation while she sleeps per lung transplant note. Endocrinology consulted for BG management.                Interval HPI:   Overnight events: Transferred to [facility]. BG above goal with januvia and correction scale coverage. Scheduled insulin started this AM. Solumedrol 500 mg daily; steroids per primary.   Eatin%  Nausea: No  Hypoglycemia and intervention: No  Fever: No  TPN and/or TF: No    BP (!) 138/97 (BP Location: Right arm, Patient Position: Lying)   Pulse 94   Temp 98.1 °F (36.7 °C) (Oral)   Resp 20   Ht 5' (1.524 m)   Wt 51.7 kg (113 lb 15.6 oz)   LMP 10/02/2016 (LMP Unknown)   SpO2 96%   Breastfeeding? No   BMI 22.26 kg/m²      Labs Reviewed and Include    Recent Labs   Lab 19  0359   *   CALCIUM 8.3*   ALBUMIN 2.2*   PROT 6.2      K 4.7   CO2 21*      BUN 18   CREATININE 0.9   ALKPHOS 148*   ALT 7*   AST 8*   BILITOT 0.2     Lab Results   Component Value Date    WBC 4.40 2019    HGB 7.7 (L)  08/16/2019    HCT 24.6 (L) 08/16/2019     (H) 08/16/2019     (L) 08/16/2019     No results for input(s): TSH, FREET4 in the last 168 hours.  Lab Results   Component Value Date    HGBA1C 4.7 01/09/2019       Nutritional status:   Body mass index is 22.26 kg/m².  Lab Results   Component Value Date    ALBUMIN 2.2 (L) 08/16/2019    ALBUMIN 2.4 (L) 08/15/2019    ALBUMIN 3.0 (L) 08/14/2019     Lab Results   Component Value Date    PREALBUMIN 15 (L) 05/29/2014    PREALBUMIN 11 (L) 05/09/2014    PREALBUMIN 18 (L) 03/19/2014       Estimated Creatinine Clearance: 65.7 mL/min (based on SCr of 0.9 mg/dL).    Accu-Checks  Recent Labs     08/15/19  0652 08/15/19  1657 08/15/19  2209 08/16/19  0801   POCTGLUCOSE 142* 394* 318* 268*       Current Medications and/or Treatments Impacting Glycemic Control  Immunotherapy:    Immunosuppressants         Stop Route Frequency     tacrolimus capsule 1 mg      -- Oral 2 times daily        Steroids:   Hormones (From admission, onward)    Start     Stop Route Frequency Ordered    08/18/19 0900  predniSONE tablet 15 mg      -- Oral Daily 08/15/19 0828    08/15/19 0800  methylPREDNISolone sodium succinate (SOLU-MEDROL) 500 mg in dextrose 5 % 100 mL IVPB      08/18 0859 IV Daily 08/15/19 0759        Pressors:    Autonomic Drugs (From admission, onward)    None        Hyperglycemia/Diabetes Medications:   Antihyperglycemics (From admission, onward)    Start     Stop Route Frequency Ordered    08/15/19 1819  insulin aspart U-100 pen 1-10 Units      -- SubQ Before meals & nightly PRN 08/15/19 1719    08/15/19 1815  insulin aspart U-100 pen 4 Units      -- SubQ 3 times daily with meals 08/15/19 1719    08/15/19 0930  SITagliptin tablet 100 mg      -- Oral Daily 08/15/19 0828          ASSESSMENT and PLAN    * Complication of transplanted lung  Managed per primary.       Hyperglycemia  BG goal 140-180.       januvia 100 mg daily restarted per primary.   Start novolog 4 units with meals  and prn evening meal (patient skips breakfast most days, eats late lunch, and dinner late).   BG monitoring ac/hs and moderate dose correction scale.       Adrenal cortical steroids causing adverse effect in therapeutic use  pulse steroids per primary.   Glucocorticoids markedly increase glucose levels. Expect the steroid taper will help glucose control.         Prophylactic immunotherapy  May increase insulin resistance.         CF related Pancreatic insufficiency  May impact BG           Amy Zapata NP  Endocrinology  [facility]

## 2019-08-16 NOTE — ANESTHESIA POSTPROCEDURE EVALUATION
Anesthesia Post Evaluation    Patient: Juanita Ibarra    Procedure(s) Performed: Procedure(s) (LRB):  flexible bronchoscopy with tissue biopsy  CPT 60936 (N/A)    Final Anesthesia Type: general  Patient location during evaluation: PACU  Patient participation: Yes- Able to Participate  Level of consciousness: awake and alert  Post-procedure vital signs: reviewed and stable  Pain management: adequate  Airway patency: patent  PONV status at discharge: No PONV  Anesthetic complications: no      Cardiovascular status: stable  Respiratory status: unassisted and spontaneous ventilation  Hydration status: euvolemic  Follow-up not needed.          Vitals Value Taken Time   /86 8/16/2019 12:28 PM   Temp 36.4 °C (97.6 °F) 8/16/2019 12:28 PM   Pulse 81 8/16/2019 12:28 PM   Resp 18 8/16/2019 12:28 PM   SpO2 93 % 8/16/2019 12:28 PM         No case tracking events are documented in the log.      Pain/Richard Score: Pain Rating Prior to Med Admin: 8 (8/16/2019 11:10 AM)  Pain Rating Post Med Admin: 7 (8/16/2019 12:10 PM)

## 2019-08-16 NOTE — PLAN OF CARE
Problem: Physical Therapy Goal  Goal: Physical Therapy Goal  Goals to be met by: 19     Patient will increase functional independence with mobility by performin. Sit to stand transfer with Supervision from all surfaces.  2. Bed to chair transfer with Contact Guard Assistance.  3. Gait  x 150 feet with Contact Guard Assistance.   4. Ascend/Descend 6 inch curb step with Contact Guard Assistance.  5. Lower extremity exercise program x 20 reps per handout, with assistance as needed.    Outcome: Ongoing (interventions implemented as appropriate)  PT goals established.

## 2019-08-16 NOTE — SUBJECTIVE & OBJECTIVE
Interval HPI:   Overnight events: Transferred to [facility]. [facility]. BG above goal with januvia and correction scale coverage. Scheduled insulin started this AM. Solumedrol 500 mg daily; steroids per primary.   Eatin%  Nausea: No  Hypoglycemia and intervention: No  Fever: No  TPN and/or TF: No    BP (!) 138/97 (BP Location: Right arm, Patient Position: Lying)   Pulse 94   Temp 98.1 °F (36.7 °C) (Oral)   Resp 20   Ht 5' (1.524 m)   Wt 51.7 kg (113 lb 15.6 oz)   LMP 10/02/2016 (LMP Unknown)   SpO2 96%   Breastfeeding? No   BMI 22.26 kg/m²     Labs Reviewed and Include    Recent Labs   Lab 19  0359   *   CALCIUM 8.3*   ALBUMIN 2.2*   PROT 6.2      K 4.7   CO2 21*      BUN 18   CREATININE 0.9   ALKPHOS 148*   ALT 7*   AST 8*   BILITOT 0.2     Lab Results   Component Value Date    WBC 4.40 2019    HGB 7.7 (L) 2019    HCT 24.6 (L) 2019     (H) 2019     (L) 2019     No results for input(s): TSH, FREET4 in the last 168 hours.  Lab Results   Component Value Date    HGBA1C 4.7 2019       Nutritional status:   Body mass index is 22.26 kg/m².  Lab Results   Component Value Date    ALBUMIN 2.2 (L) 2019    ALBUMIN 2.4 (L) 08/15/2019    ALBUMIN 3.0 (L) 2019     Lab Results   Component Value Date    PREALBUMIN 15 (L) 2014    PREALBUMIN 11 (L) 2014    PREALBUMIN 18 (L) 2014       Estimated Creatinine Clearance: 65.7 mL/min (based on SCr of 0.9 mg/dL).    Accu-Checks  Recent Labs     08/15/19  0652 08/15/19  1657 08/15/19  2209 19  0801   POCTGLUCOSE 142* 394* 318* 268*       Current Medications and/or Treatments Impacting Glycemic Control  Immunotherapy:    Immunosuppressants         Stop Route Frequency     tacrolimus capsule 1 mg      -- Oral 2 times daily        Steroids:   Hormones (From admission, onward)    Start     Stop Route Frequency Ordered    19 0900  predniSONE tablet 15 mg      -- Oral Daily  08/15/19 0828    08/15/19 0800  methylPREDNISolone sodium succinate (SOLU-MEDROL) 500 mg in dextrose 5 % 100 mL IVPB      08/18 0859 IV Daily 08/15/19 0759        Pressors:    Autonomic Drugs (From admission, onward)    None        Hyperglycemia/Diabetes Medications:   Antihyperglycemics (From admission, onward)    Start     Stop Route Frequency Ordered    08/15/19 1819  insulin aspart U-100 pen 1-10 Units      -- SubQ Before meals & nightly PRN 08/15/19 1719    08/15/19 1815  insulin aspart U-100 pen 4 Units      -- SubQ 3 times daily with meals 08/15/19 1719    08/15/19 0930  SITagliptin tablet 100 mg      -- Oral Daily 08/15/19 0828

## 2019-08-16 NOTE — PLAN OF CARE
Problem: Adult Inpatient Plan of Care  Goal: Plan of Care Review  Outcome: Ongoing (interventions implemented as appropriate)  -AAOx4  -RSC port saline locked. Dressing CDI.   -Chest incision with wound vac @ 125 continuous suction.   -Zerbaxa and micafungin continued for Candida Albicans and Pseudomonas from previous bronch cultures. Repeat bronch yesterday. CX pending.   -Benadryl 50mg given for itching.   -Oxy 5mg given for pain.   -Ativan given for anxiety.   -PRN breathing tx requested and given by RT.   -BG checks ac/hs. Night time - 4u SSI given per order.   -Up with assistance.  @ bedside attentive to pt needs. Wears non slip socks when oob. Free from falls/injuries thus far this shift.   -Bed in lowest, locked position. Bed rails up x2. Call light and personal belongings within reach.   -Will continue to monitor.

## 2019-08-17 NOTE — NURSING
Pt to be d/c off unit via wheelchair with . Home wound vac in place to 125 suction - pt verbalized understanding . Port saline locked - pt stated she would hep lock port when she got home. AVS printed and given to pt and pt's  - both verbalized understanding of future appointments, when to notify the HCP, and medications. Pt in no apparent distress.

## 2019-08-17 NOTE — PLAN OF CARE
Problem: Adult Inpatient Plan of Care  Goal: Plan of Care Review  Outcome: Ongoing (interventions implemented as appropriate)  · Pt is aaox3, denies dyspnea or nausea.  · Reports generalized back pain, prn oxycodone administered which provided moderate relief.  · Frequent itching related to adhesive, prn benadryl = temporary relief.   · Occasional anxiety, relieved by ativan.  · This RN instructed the pt on the importance of hand hygiene, pt verbalized understanding.   · Pt turns self in bed independently no signs of skin breakdown noted.    · Neutropenic and contact isolation precautions in place.  · Fall precautions in place, side rails upx3, non slip footwear applied, pt instructed to call for assistance before attempting to ambulate, pt verbalized understanding. Call bell within reach, family present at bedside.    ·   ·

## 2019-08-17 NOTE — PROGRESS NOTES
[facility]  Lung Transplant  Progress Note - Floor    Patient Name: Juanita Ibarra  MRN: [id number]  Admission Date: 8/15/2019  Hospital Length of Stay: 2 days  Post-Operative Day: 1892 (Lung), 59 (Lung)  Attending Physician: Francisca Morin DO  Primary Care Provider: Primary Doctor Beth     Subjective:     Interval History: No acute events overnight. Cough remains non-productive. Still with ongoing fatigue and weakness.     Continuous Infusions:   sodium chloride 0.9% 25 mL/hr (08/15/19 1009)     Scheduled Meds:   ceftolozane-tazobactam  1,500 mg Intravenous Q8H    dapsone  100 mg Oral Daily    enoxaparin  40 mg Subcutaneous Daily    fluticasone propionate  2 spray Each Nostril Daily    gabapentin  300 mg Oral TID    insulin aspart U-100  8 Units Subcutaneous TIDWM    magnesium oxide  400 mg Oral TID    metoprolol tartrate  25 mg Oral BID    micafungin (MYCAMINE) IVPB  100 mg Intravenous Q24H    morphine  15 mg Oral Q12H    pantoprazole  40 mg Oral Daily    polyethylene glycol  17 g Oral BID    [START ON 8/18/2019] predniSONE  15 mg Oral Daily    QUEtiapine  25 mg Oral Daily    QUEtiapine  50 mg Oral QHS    SITagliptin  100 mg Oral Daily    tacrolimus  1 mg Oral BID    tiZANidine  4 mg Oral BID    valGANciclovir  450 mg Oral BID    venlafaxine  150 mg Oral QHS    voriconazole  200 mg Oral BID     PRN Meds:Dextrose 10% Bolus, Dextrose 10% Bolus, diphenhydrAMINE, glucagon (human recombinant), glucose, glucose, insulin aspart U-100, insulin aspart U-100, levalbuterol, LORazepam, magnesium sulfate IVPB **AND** magnesium sulfate IVPB, ondansetron, oxyCODONE, potassium chloride 10% **AND** potassium chloride 10% **AND** potassium chloride 10%    Review of patient's allergies indicates:   Allergen Reactions    Albuterol Palpitations    Colistin Anaphylaxis    Vancomycin analogues      Infusion reaction that does not resolve with slowing  Pt. States she can tolerate  it when given with 50 mg Benadryl and ran over 3 hours    Neupogen [filgrastim] Other (See Comments)     Ostealgia after five daily doses of 300 mcg.      Bactrim [sulfamethoxazole-trimethoprim] Hives    Ceftazidime Hives     Pt stated can tolerate cefapine not ceftazidime    Ceftazidime     Dronabinol Other (See Comments)     Mental changes/hallucinations    Haldol [haloperidol lactate] Other (See Comments)     Seizure like activity    Nsaids (non-steroidal anti-inflammatory drug)      Cannot have due to lung transplant    Adhesive Rash     Cloth tape- please use tegaderm or paper tape    Aztreonam Rash    Ciprofloxacin Nausea And Vomiting     Projectile N/V, per patient.  Unwilling to retry therapy.       Review of Systems   Constitutional: Positive for chills and fatigue. Negative for activity change, appetite change, fever and unexpected weight change.   HENT: Negative for congestion, postnasal drip, rhinorrhea, sinus pressure, sneezing, sore throat, trouble swallowing and voice change.    Eyes: Negative for photophobia and visual disturbance.   Respiratory: Positive for cough and shortness of breath. Negative for apnea, chest tightness, wheezing and stridor.    Cardiovascular: Negative for chest pain, palpitations and leg swelling.   Gastrointestinal: Negative for abdominal distention, abdominal pain, constipation, diarrhea, nausea and vomiting.   Endocrine: Negative for polydipsia, polyphagia and polyuria.   Genitourinary: Negative for dysuria, flank pain, frequency and hematuria.   Musculoskeletal: Positive for back pain (chronic) and myalgias (chronic). Negative for arthralgias and joint swelling.   Skin: Negative for color change, pallor and rash.   Allergic/Immunologic: Positive for immunocompromised state.   Neurological: Positive for weakness. Negative for dizziness, tremors, seizures, syncope, speech difficulty, light-headedness, numbness and headaches.   Hematological: Does not bruise/bleed  easily.   Psychiatric/Behavioral: Negative for agitation, confusion, decreased concentration, hallucinations, self-injury, sleep disturbance and suicidal ideas. The patient is not nervous/anxious.      Objective:   Physical Exam   Constitutional: She is oriented to person, place, and time. She appears well-developed and well-nourished. Nasal cannula in place.   HENT:   Head: Normocephalic and atraumatic.   Nose: Nose normal.   Eyes: Conjunctivae and EOM are normal.   Neck: Normal range of motion. Neck supple. No JVD present.   Cardiovascular: Normal rate and regular rhythm.   Pulmonary/Chest: She has no decreased breath sounds. She has no wheezes. She has rhonchi (diffuse). She has no rales.   Wound vac in place   Abdominal: Soft. Bowel sounds are normal. She exhibits no distension. There is no tenderness.   Musculoskeletal: Normal range of motion. She exhibits no edema or deformity.   Neurological: She is alert and oriented to person, place, and time.   Skin: Skin is warm and dry.   Psychiatric: She has a normal mood and affect. Her behavior is normal.   Nursing note and vitals reviewed.        Vital Signs (Most Recent):  Temp: 97.7 °F (36.5 °C) (08/17/19 0809)  Pulse: 83 (08/17/19 0809)  Resp: 18 (08/17/19 0809)  BP: (!) 163/109 (08/17/19 0809)  SpO2: 95 % (08/17/19 0809) Vital Signs (24h Range):  Temp:  [97.6 °F (36.4 °C)-98.4 °F (36.9 °C)] 97.7 °F (36.5 °C)  Pulse:  [] 83  Resp:  [16-18] 18  SpO2:  [93 %-96 %] 95 %  BP: (134-163)/() 163/109     Weight: 51.7 kg (113 lb 15.7 oz)  Body mass index is 22.26 kg/m².      Intake/Output Summary (Last 24 hours) at 8/17/2019 0937  Last data filed at 8/17/2019 0600  Gross per 24 hour   Intake 1240 ml   Output --   Net 1240 ml       Significant Labs:  CBC:  Recent Labs   Lab 08/17/19  0549   WBC 6.63   RBC 2.38*   HGB 8.0*   HCT 24.5*   *   *   MCH 33.6*   MCHC 32.7     BMP:  Recent Labs   Lab 08/16/19  0359      K 4.7      CO2 21*   BUN  18   CREATININE 0.9   CALCIUM 8.3*      Tacrolimus Levels:  Recent Labs   Lab 08/16/19  0359   TACROLIMUS 5.3     Microbiology:  Microbiology Results (last 7 days)     Procedure Component Value Units Date/Time    AFB Culture & Smear [[id number]] Collected:  08/15/19 0800    Order Status:  Completed Specimen:  Respiratory from BAL, RML Updated:  08/16/19 1416     AFB Culture & Smear Culture in progress     AFB CULTURE STAIN No acid fast bacilli seen.    Narrative:       Bronchial Wash    Culture, Respiratory [[id number]] Collected:  08/15/19 0800    Order Status:  Completed Specimen:  Respiratory from BAL, RML Updated:  08/16/19 0750     Respiratory Culture No Growth     Gram Stain (Respiratory) <10 epithelial cells per low power field.     Gram Stain (Respiratory) Many WBC's     Gram Stain (Respiratory) No organisms seen    Narrative:       Bronchial Wash    Fungus culture [[id number]] Collected:  08/15/19 0800    Order Status:  Sent Specimen:  Respiratory from BAL, RML Updated:  08/15/19 0816          I have reviewed all pertinent labs within the past 24 hours.    Diagnostic Results:  Labs: Reviewed  X-Ray: Reviewed      Assessment/Plan:     * Complication of transplanted lung  Underwent re-transplant for CARLOS EDUARDO on 6/18/19. Will continue home antibiotic of Zerbaxa and antifungal Micafungin. Candida glabrata and pseudomonas from respiratory culture, susceptibilities pending. BAL and path from 8/15 bronchoscopy pending. Continue empiric pulse steroids. Maintain O2 sats >88%. Continue immunosuppression and prophylaxis.     Immunosuppression  Continue prednisone and tacrolimus.  Will monitor tacrolimus levels daily while inpatient.    Prophylactic antibiotic  Continue Valcyte and dapsone    Chronic pain with opiate use  Will continue home medications of MS Contin and oxycodone     CF related Pancreatic insufficiency  Continue pancreatic enzymes with meals and snacks.     Adrenal cortical steroids causing adverse effect in  therapeutic use  Endocrine consulted, appreciate recs.         Shama Canales PA-C  Lung Transplant  [facility]

## 2019-08-17 NOTE — DISCHARGE SUMMARY
[facility]  Lung Transplant  Discharge Summary      Patient Name: Juanita Ibarra  MRN: [id number]  Admission Date: 8/15/2019  Hospital Length of Stay: 2 days  Discharge Date and Time: 08/17/2019 11:56 AM  Attending Physician: Francisca Morni DO   Discharging Provider: Shama Canales PA-C  Primary Care Provider: Primary Doctor No     HPI: Juanita Ibarra is a 29 y.o. year old female who presented today for a bronchoscopy.  She is s/p lung re-transplantation for CARLOS EDUARDO.  She was recently started on ceftolozane/tazobactam and micafungin for sputum cultures growing two species of non-albicans Candida and Pseudomonas.  She reports overall not feeling well for the past few weeks and has been having intermittent fevers with increase in sputum production, and low oxygenation while she sleeps. Denies sick contacts and is compliant with her medications.        Procedure(s) (LRB):  flexible bronchoscopy with tissue biopsy  CPT 42680 (N/A)     Hospital Course: Uncomplicated hospital course. Patient tolerated 3 day course of pulse steroids for concerns for ACR on 8/14 bronchoscopy. Plan for discharge home today with plan to follow up in outpatient lung transplant clinic on 8/21.     * Complication of transplanted lung  Underwent re-transplant for CARLOS EDUARDO on 6/18/19. Will continue home antibiotic of Zerbaxa and antifungal Micafungin. Candida glabrata and pseudomonas from respiratory culture, susceptibilities pending. BAL and path from 8/15 bronchoscopy pending. Tolerated 3 days of pulse steroids. Will follow up in clinic on 8/21.      Immunosuppression  Continue prednisone and tacrolimus.      Prophylactic antibiotic  Continue Valcyte and dapsone     Chronic pain with opiate use  Continue home medications of MS Contin and oxycodone      CF related Pancreatic insufficiency  Continue pancreatic enzymes with meals and snacks.      Adrenal cortical steroids causing adverse effect in therapeutic  use  Resume Januvia and continue correction scale as needed. Follow up with endocrinology in 1 week.     Consults (From admission, onward)        Status Ordering Provider     Inpatient consult to Endocrinology  Once     Provider:  (Not yet assigned)    DUSTY Pearl          Significant Diagnostic Studies: Labs: All labs within the past 24 hours have been reviewed  Microbiology:   Sputum Culture   Lab Results   Component Value Date    GSRESP <10 epithelial cells per low power field. 08/15/2019    GSRESP Many WBC's 08/15/2019    GSRESP No organisms seen 08/15/2019    RESPIRATORYC No growth 08/15/2019     Radiology: X-Ray: CXR: X-Ray Chest 1 View (CXR):   Results for orders placed or performed during the hospital encounter of 08/15/19   X-Ray Chest AP Single View    Narrative    EXAMINATION:  XR CHEST 1 VIEW    CLINICAL HISTORY:  s/p bronchoscopy;    TECHNIQUE:  Single frontal view of the chest was performed.    COMPARISON:  N 08/14/2019 CT scan of the chest one    FINDINGS:  Postoperative changes as before.  Central venous catheter in the SVC.  The heart is not enlarged.  Ill-defined patchy airspace consolidation bilaterally more marked on the right side.  There is also blunting of the right costophrenic angle suggesting pleural effusion.  No extrapulmonary air identified.      Impression    See above      Electronically signed by: Pradip Cohen MD  Date:    08/15/2019  Time:    09:11    and X-Ray Chest PA and Lateral (CXR): No results found for this visit on 08/15/19.    Pending Diagnostic Studies:     None        Final Active Diagnoses:    Diagnosis Date Noted POA    PRINCIPAL PROBLEM:  Complication of transplanted lung [T86.819] 08/08/2014 Yes    Immunosuppression [D89.9] 06/12/2014 Yes    Prophylactic antibiotic [Z79.2] 06/30/2014 Not Applicable    Chronic pain with opiate use [G89.29] 12/20/2013 Yes    CF related Pancreatic insufficiency [K86.89] 12/20/2013 Yes    Adrenal cortical steroids  causing adverse effect in therapeutic use [T38.0X5A] 06/12/2014 Yes    Delayed surgical wound healing [T81.89XA] 08/16/2019 Yes    Prophylactic immunotherapy [Z29.8] 06/18/2019 Not Applicable    Hyperglycemia [R73.9] 06/12/2014 Yes      Problems Resolved During this Admission:      Discharged Condition: stable    Disposition: Home or Self Care    Medications:  Reconciled Home Medications:      Medication List      START taking these medications    dextrose 5 % SolP 100 mL with ceftolozane-tazobactam 1.5 gram SolR 1,500 mg injection  Inject 1,500 mg into the vein every 8 (eight) hours.     insulin aspart U-100 100 unit/mL (3 mL) Inpn pen  Commonly known as:  NovoLOG  Inject 1-10 Units into the skin before meals and at bedtime as needed (Hyperglycemia).     MICAFUNGIN 100 MG/100 ML NS (READY TO MIX SYSTEM)  Inject 100 mLs (100 mg total) into the vein once daily.        CHANGE how you take these medications    CREON 36,000-114,000- 180,000 unit Cpdr  Generic drug:  lipase-protease-amylase  Take 4 capsules by mouth 3 (three) times daily with meals. Take 3 with snacks as needed.  What changed:  how much to take        CONTINUE taking these medications    dapsone 100 MG Tab  Take 1 tablet (100 mg total) by mouth once daily.     diphenhydrAMINE 50 MG tablet  Commonly known as:  BENADRYL  Take 1 tablet (50 mg total) by mouth every 6 (six) hours as needed for Itching.     fexofenadine 180 MG tablet  Commonly known as:  ALLEGRA  Take 180 mg by mouth once daily.     fluticasone propionate 50 mcg/actuation nasal spray  Commonly known as:  FLONASE  INSERT 2 SPRAYS IN EACH NOSTRIL DAILY     folic acid 1 MG tablet  Commonly known as:  FOLVITE  Take 1 tablet (1,000 mcg total) by mouth once daily.     gabapentin 300 MG capsule  Commonly known as:  NEURONTIN  Take 1 capsule (300 mg total) by mouth 3 (three) times daily.     hydrocortisone 1 % cream  Apply topically 2 (two) times daily.     inhalation spacing device  Commonly known  as:  PROCHAMBER  USE AS DIRECTED     JANUVIA 100 MG Tab  Generic drug:  SITagliptin  Take 1 tablet (100 mg total) by mouth once daily.     levalbuterol 1.25 mg/3 mL nebulizer solution  Commonly known as:  XOPENEX  Take 3 mLs (1.25 mg total) by nebulization every 8 (eight) hours as needed for Wheezing or Shortness of Breath. Rescue     LORazepam 1 MG tablet  Commonly known as:  ATIVAN  Take 1 tablet (1 mg total) by mouth every 8 (eight) hours as needed for Anxiety.     magnesium oxide 400 mg (241.3 mg magnesium) tablet  Commonly known as:  MAG-OX  Take 1 tablet (400 mg total) by mouth 3 (three) times daily.     metoprolol tartrate 25 MG tablet  Commonly known as:  LOPRESSOR  Take 1 tablet (25 mg total) by mouth 2 (two) times daily.     montelukast 10 mg tablet  Commonly known as:  SINGULAIR  Take 1 tablet (10 mg total) by mouth once daily.     morphine 15 MG 12 hr tablet  Commonly known as:  MS CONTIN  Take 1 tablet by mouth twice daily.     multivit,min52-folic-vitK-cQ10 100-700-10 mcg-mcg-mg Cap cap  Commonly known as:  AQUADEKS  1 tab twice daily     omeprazole 40 MG capsule  Commonly known as:  PRILOSEC  Take 1 capsule (40 mg total) by mouth once daily.     ondansetron 8 MG tablet  Commonly known as:  ZOFRAN  Take 1 tablet (8 mg total) by mouth every 12 (twelve) hours as needed for Nausea.     oxyCODONE 5 MG immediate release tablet  Commonly known as:  ROXICODONE  Take 1 tablet by mouth four times daily     OYSTER SHELL CALCIUM-VIT D3 500 mg(1,250mg) -200 unit per tablet  Generic drug:  calcium-vitamin D3  Take 1 tablet by mouth 2 (two) times daily with meals.     polyethylene glycol 17 gram Pwpk  Commonly known as:  GLYCOLAX  Take 17 g by mouth 2 (two) times daily.     predniSONE 5 MG tablet  Commonly known as:  DELTASONE  Take 3 tablets (15 mg total) by mouth once daily.     QUEtiapine 25 MG Tab  Commonly known as:  SEROQUEL  Take 1 tablet by mouth in morning and 2 tablets at bedtime     tacrolimus 1 MG  Cap  Commonly known as:  PROGRAF  Take 1 capsule (1 mg total) by mouth every 12 (twelve) hours.     tiZANidine 4 MG tablet  Commonly known as:  ZANAFLEX  Take 1 tablet (4 mg total) by mouth 2 (two) times daily.     topiramate 25 MG tablet  Commonly known as:  TOPAMAX  Take 1 tablet (25 mg total) by mouth 2 (two) times daily     valGANciclovir 450 mg Tab  Commonly known as:  VALCYTE  Take 1 tablet (450 mg total) by mouth 2 (two) times daily.     * venlafaxine 37.5 MG 24 hr capsule  Commonly known as:  EFFEXOR-XR  Take 1 capsule (37.5 mg total) by mouth once daily.     * venlafaxine 150 MG Cp24  Commonly known as:  EFFEXOR-XR  Take 1 capsule (150 mg total) by mouth once daily.     voriconazole 200 MG Tab  Commonly known as:  VFEND  Take 1 tablet (200 mg total) by mouth 2 (two) times daily.         * This list has 2 medication(s) that are the same as other medications prescribed for you. Read the directions carefully, and ask your doctor or other care provider to review them with you.              Patient Instructions:      Ambulatory referral to Endocrinology   Referral Priority: Routine Referral Type: Consultation   Requested Specialty: Endocrinology   Number of Visits Requested: 1     Diet Adult Regular     Notify your health care provider if you experience any of the following:     Notify your health care provider if you experience any of the following:  increased confusion or weakness     Notify your health care provider if you experience any of the following:  persistent dizziness, light-headedness, or visual disturbances     Notify your health care provider if you experience any of the following:  worsening rash     Notify your health care provider if you experience any of the following:  severe persistent headache     Notify your health care provider if you experience any of the following:  difficulty breathing or increased cough     Notify your health care provider if you experience any of the following:   redness, tenderness, or signs of infection (pain, swelling, redness, odor or green/yellow discharge around incision site)     Notify your health care provider if you experience any of the following:  severe uncontrolled pain     Notify your health care provider if you experience any of the following:  persistent nausea and vomiting or diarrhea     Notify your health care provider if you experience any of the following:  temperature >100.4     Activity as tolerated     Follow Up:  Follow-up Information     Francisca Morin DO On 8/21/2019.    Specialties:  Pulmonary Disease, Critical Care Medicine  Why:  Routine follow up after hospital discharge  Contact information:  [address]  [facility] [address]  [phone]                   Shama Canales PA-C  Lung Transplant  [facility]

## 2019-08-17 NOTE — SUBJECTIVE & OBJECTIVE
Subjective:     Interval History: No acute events overnight. Cough remains non-productive. Still with ongoing fatigue and weakness.     Continuous Infusions:   sodium chloride 0.9% 25 mL/hr (08/15/19 1009)     Scheduled Meds:   ceftolozane-tazobactam  1,500 mg Intravenous Q8H    dapsone  100 mg Oral Daily    enoxaparin  40 mg Subcutaneous Daily    fluticasone propionate  2 spray Each Nostril Daily    gabapentin  300 mg Oral TID    insulin aspart U-100  8 Units Subcutaneous TIDWM    magnesium oxide  400 mg Oral TID    metoprolol tartrate  25 mg Oral BID    micafungin (MYCAMINE) IVPB  100 mg Intravenous Q24H    morphine  15 mg Oral Q12H    pantoprazole  40 mg Oral Daily    polyethylene glycol  17 g Oral BID    [START ON 8/18/2019] predniSONE  15 mg Oral Daily    QUEtiapine  25 mg Oral Daily    QUEtiapine  50 mg Oral QHS    SITagliptin  100 mg Oral Daily    tacrolimus  1 mg Oral BID    tiZANidine  4 mg Oral BID    valGANciclovir  450 mg Oral BID    venlafaxine  150 mg Oral QHS    voriconazole  200 mg Oral BID     PRN Meds:Dextrose 10% Bolus, Dextrose 10% Bolus, diphenhydrAMINE, glucagon (human recombinant), glucose, glucose, insulin aspart U-100, insulin aspart U-100, levalbuterol, LORazepam, magnesium sulfate IVPB **AND** magnesium sulfate IVPB, ondansetron, oxyCODONE, potassium chloride 10% **AND** potassium chloride 10% **AND** potassium chloride 10%    Review of patient's allergies indicates:   Allergen Reactions    Albuterol Palpitations    Colistin Anaphylaxis    Vancomycin analogues      Infusion reaction that does not resolve with slowing  Pt. States she can tolerate it when given with 50 mg Benadryl and ran over 3 hours    Neupogen [filgrastim] Other (See Comments)     Ostealgia after five daily doses of 300 mcg.      Bactrim [sulfamethoxazole-trimethoprim] Hives    Ceftazidime Hives     Pt stated can tolerate cefapine not ceftazidime    Ceftazidime     Dronabinol Other (See  Comments)     Mental changes/hallucinations    Haldol [haloperidol lactate] Other (See Comments)     Seizure like activity    Nsaids (non-steroidal anti-inflammatory drug)      Cannot have due to lung transplant    Adhesive Rash     Cloth tape- please use tegaderm or paper tape    Aztreonam Rash    Ciprofloxacin Nausea And Vomiting     Projectile N/V, per patient.  Unwilling to retry therapy.       Review of Systems   Constitutional: Positive for chills and fatigue. Negative for activity change, appetite change, fever and unexpected weight change.   HENT: Negative for congestion, postnasal drip, rhinorrhea, sinus pressure, sneezing, sore throat, trouble swallowing and voice change.    Eyes: Negative for photophobia and visual disturbance.   Respiratory: Positive for cough and shortness of breath. Negative for apnea, chest tightness, wheezing and stridor.    Cardiovascular: Negative for chest pain, palpitations and leg swelling.   Gastrointestinal: Negative for abdominal distention, abdominal pain, constipation, diarrhea, nausea and vomiting.   Endocrine: Negative for polydipsia, polyphagia and polyuria.   Genitourinary: Negative for dysuria, flank pain, frequency and hematuria.   Musculoskeletal: Positive for back pain (chronic) and myalgias (chronic). Negative for arthralgias and joint swelling.   Skin: Negative for color change, pallor and rash.   Allergic/Immunologic: Positive for immunocompromised state.   Neurological: Positive for weakness. Negative for dizziness, tremors, seizures, syncope, speech difficulty, light-headedness, numbness and headaches.   Hematological: Does not bruise/bleed easily.   Psychiatric/Behavioral: Negative for agitation, confusion, decreased concentration, hallucinations, self-injury, sleep disturbance and suicidal ideas. The patient is not nervous/anxious.      Objective:   Physical Exam   Constitutional: She is oriented to person, place, and time. She appears well-developed and  well-nourished. Nasal cannula in place.   HENT:   Head: Normocephalic and atraumatic.   Nose: Nose normal.   Eyes: Conjunctivae and EOM are normal.   Neck: Normal range of motion. Neck supple. No JVD present.   Cardiovascular: Normal rate and regular rhythm.   Pulmonary/Chest: She has no decreased breath sounds. She has no wheezes. She has rhonchi (diffuse). She has no rales.   Wound vac in place   Abdominal: Soft. Bowel sounds are normal. She exhibits no distension. There is no tenderness.   Musculoskeletal: Normal range of motion. She exhibits no edema or deformity.   Neurological: She is alert and oriented to person, place, and time.   Skin: Skin is warm and dry.   Psychiatric: She has a normal mood and affect. Her behavior is normal.   Nursing note and vitals reviewed.        Vital Signs (Most Recent):  Temp: 97.7 °F (36.5 °C) (08/17/19 0809)  Pulse: 83 (08/17/19 0809)  Resp: 18 (08/17/19 0809)  BP: (!) 163/109 (08/17/19 0809)  SpO2: 95 % (08/17/19 0809) Vital Signs (24h Range):  Temp:  [97.6 °F (36.4 °C)-98.4 °F (36.9 °C)] 97.7 °F (36.5 °C)  Pulse:  [] 83  Resp:  [16-18] 18  SpO2:  [93 %-96 %] 95 %  BP: (134-163)/() 163/109     Weight: 51.7 kg (113 lb 15.7 oz)  Body mass index is 22.26 kg/m².      Intake/Output Summary (Last 24 hours) at 8/17/2019 0937  Last data filed at 8/17/2019 0600  Gross per 24 hour   Intake 1240 ml   Output --   Net 1240 ml       Significant Labs:  CBC:  Recent Labs   Lab 08/17/19  0549   WBC 6.63   RBC 2.38*   HGB 8.0*   HCT 24.5*   *   *   MCH 33.6*   MCHC 32.7     BMP:  Recent Labs   Lab 08/16/19  0359      K 4.7      CO2 21*   BUN 18   CREATININE 0.9   CALCIUM 8.3*      Tacrolimus Levels:  Recent Labs   Lab 08/16/19  0359   TACROLIMUS 5.3     Microbiology:  Microbiology Results (last 7 days)     Procedure Component Value Units Date/Time    AFB Culture & Smear [[id number]] Collected:  08/15/19 0800    Order Status:  Completed Specimen:  Respiratory  from [facility] Updated:  08/16/19 1416     AFB Culture & Smear Culture in progress     AFB CULTURE STAIN No acid fast bacilli seen.    Narrative:       Bronchial Wash    Culture, Respiratory [[id number]] Collected:  08/15/19 0800    Order Status:  Completed Specimen:  Respiratory from [facility] Updated:  08/16/19 0750     Respiratory Culture No Growth     Gram Stain (Respiratory) <10 epithelial cells per low power field.     Gram Stain (Respiratory) Many WBC's     Gram Stain (Respiratory) No organisms seen    Narrative:       Bronchial Wash    Fungus culture [[id number]] Collected:  08/15/19 0800    Order Status:  Sent Specimen:  Respiratory from [facility] Updated:  08/15/19 0816          I have reviewed all pertinent labs within the past 24 hours.    Diagnostic Results:  Labs: Reviewed  X-Ray: Reviewed

## 2019-08-17 NOTE — CARE UPDATE
BG goal 140-180. BG above goal with scheduled insulin and correction scale coverage plus januvia daily. Solumedrol 500 mg; third dose today.     januvia 100 mg daily restarted per primary.   Increase novolog 8 units with meals and prn evening meal (patient skips breakfast most days, eats late lunch, and dinner late).   BG monitoring ac/hs and moderate dose correction scale.     ** Please call Endocrine for any BG related issues **

## 2019-08-17 NOTE — SUBJECTIVE & OBJECTIVE
Interval HPI:   Overnight events: Remains in TSU. NAEON. BG remains above goal with scheduled insulin, januvia, and correction scale coverage. Prednisone 20 mg daily.   Eatin% eats towards end of day; drinks soft drinks/ outside food   Nausea: No  Hypoglycemia and intervention: No  Fever: No  TPN and/or TF: No      BP (!) 163/109 (BP Location: Right arm, Patient Position: Lying)   Pulse 83   Temp 97.7 °F (36.5 °C) (Oral)   Resp 18   Ht 5' (1.524 m)   Wt 51.7 kg (113 lb 15.7 oz)   LMP 10/02/2016 (LMP Unknown)   SpO2 95%   Breastfeeding? No   BMI 22.26 kg/m²     Labs Reviewed and Include    No results for input(s): GLU, CALCIUM, ALBUMIN, PROT, NA, K, CO2, CL, BUN, CREATININE, ALKPHOS, ALT, AST, BILITOT in the last 24 hours.  Lab Results   Component Value Date    WBC 6.63 2019    HGB 8.0 (L) 2019    HCT 24.5 (L) 2019     (H) 2019     (L) 2019     No results for input(s): TSH, FREET4 in the last 168 hours.  Lab Results   Component Value Date    HGBA1C 4.7 2019       Nutritional status:   Body mass index is 22.26 kg/m².  Lab Results   Component Value Date    ALBUMIN 2.2 (L) 2019    ALBUMIN 2.4 (L) 08/15/2019    ALBUMIN 3.0 (L) 2019     Lab Results   Component Value Date    PREALBUMIN 15 (L) 2014    PREALBUMIN 11 (L) 2014    PREALBUMIN 18 (L) 2014       Estimated Creatinine Clearance: 65.7 mL/min (based on SCr of 0.9 mg/dL).    Accu-Checks  Recent Labs     08/15/19  0652 08/15/19  1657 08/15/19  2209 19  0801 19  1145 19  1429 19  1722 19  2314   POCTGLUCOSE 142* 394* 318* 268* 172* 178* 341* 258*       Current Medications and/or Treatments Impacting Glycemic Control  Immunotherapy:    Immunosuppressants         Stop Route Frequency     tacrolimus capsule 1 mg      -- Oral 2 times daily        Steroids:   Hormones (From admission, onward)    Start     Stop Route Frequency Ordered    19 0900   predniSONE tablet 15 mg      -- Oral Daily 08/15/19 0828    08/15/19 0800  methylPREDNISolone sodium succinate (SOLU-MEDROL) 500 mg in dextrose 5 % 100 mL IVPB      08/18 0859 IV Daily 08/15/19 0759        Pressors:    Autonomic Drugs (From admission, onward)    None        Hyperglycemia/Diabetes Medications:   Antihyperglycemics (From admission, onward)    Start     Stop Route Frequency Ordered    08/16/19 1655  insulin aspart U-100 pen 4 Units      -- SubQ As needed (PRN) 08/16/19 1555    08/15/19 1819  insulin aspart U-100 pen 1-10 Units      -- SubQ Before meals & nightly PRN 08/15/19 1719    08/15/19 1815  insulin aspart U-100 pen 4 Units      -- SubQ 3 times daily with meals 08/15/19 1719    08/15/19 0930  SITagliptin tablet 100 mg      -- Oral Daily 08/15/19 0828

## 2019-08-22 NOTE — PROGRESS NOTES
Patient seen and examined. Patient is progressively increasing activity. No significant complaints. Here today for wound vac dressing change. Cultures so far are negative for bacterial infection and currently growing an unidentified yeast which is presumably candida glabrata which she has consistently had in the past.      Incision with wound vac in place   Chest xray: Acceptable post op chest    Assessment:  S/P lung transplant 6/18/19     Plan:          No scheduled appointment, RTC prn    I have seen the patient and reviewed the physician assistant's note above. I have personally interviewed and examined the patient at bedside and agree with the findings.     Mrs. Ibarra is doing well with her wound vac.  The dressings were changed in clinic today and good granulation tissue was noted.  She can continue home dressing changes with her home health team.      Jonathan Newby MD  Cardiothoracic Surgery  [facility]

## 2019-08-22 NOTE — PROGRESS NOTES
LUNG TRANSPLANT RECIPIENT FOLLOW-UP    Reason for Visit: Follow-up after lung transplantation.                                                                                                         Date of Transplant:   1. 6/12/2014  2. Redo 6/18/19                                                                               Reason for Transplant:   1. Cystic Fibrosis  2. CARLOS EDUARDO                                                                               Type of Transplant: bilateral sequential lung                                                                               CMV Status: D+ / R-      Hepatitis C Status:  Donor Ab:(--)  Donor ANY:(--)  Recipient serostatus:(--)  Treatment status: NA                                                                              Major Complications:   1. Vocal cord dysfunction- resolved   2. A1 Rejection 8/2014   3. C glabrata positive sputum-recurrent  4. Pseudomonas pneumonia in 02/2015 resolved   5. 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 CARLOS EDUARDO (grade 3)  7. Chronic respiratory failure                                                                               History of Present Illness: Juanita Ibarra is a 29 y.o. year old female with the above listed transplant history who presents today for routine follow-up following recent hospital admission.  During that admission, she underwent bronchoscopy with cultures and TBBx.  Biopsies were negative for rejection but she was empirically pulsed with steroids for 3 days.  Cultures so far are negative for bacterial infection and currently growing an unidentified yeast which is presumably candida glabrata which she has consistently had in the past.  Since discharge, she has been feeling well.  Has been increasing her activity at home.  Still complains of occasional cough productive of white sputum.  Denies any fever, chills, or nightsweats.  Has completed abx therapy with Zerbaxa and Micafungin.  Sees CTS today to evaluate the need  for continued [place] placement.  Overall, has been seeing slow improvement.      Review of Systems   Constitutional: Positive for fever and malaise/fatigue. Negative for chills, diaphoresis and weight loss.   HENT: Negative for congestion, ear discharge, ear pain, hearing loss, nosebleeds and sore throat.    Eyes: Negative for blurred vision, double vision and photophobia.   Respiratory: Positive for cough, sputum production and shortness of breath. Negative for hemoptysis and wheezing.    Cardiovascular: Negative for chest pain, palpitations, orthopnea, claudication, leg swelling and PND.   Gastrointestinal: Negative for abdominal pain, blood in stool, constipation, diarrhea, heartburn, melena, nausea and vomiting.   Genitourinary: Negative for dysuria, flank pain, frequency, hematuria and urgency.   Musculoskeletal: Positive for back pain and myalgias. Negative for falls, joint pain and neck pain.   Skin: Negative for itching and rash.   Neurological: Positive for weakness. Negative for dizziness, tremors, sensory change, loss of consciousness and headaches.   Endo/Heme/Allergies: Does not bruise/bleed easily.   Psychiatric/Behavioral: Negative for depression, hallucinations and memory loss. The patient is not nervous/anxious and does not have insomnia.      /85 (BP Location: Left arm, Patient Position: Sitting)   Pulse 101   Temp 98.2 °F (36.8 °C)   Resp 20   Ht 5' (1.524 m)   Wt 52.2 kg (115 lb)   LMP 10/02/2016 (LMP Unknown)   SpO2 (!) 94%   BMI 22.46 kg/m²     Physical Exam   Constitutional: She is oriented to person, place, and time and well-developed, well-nourished, and in no distress. No distress.   HENT:   Head: Normocephalic and atraumatic.   Nose: Nose normal.   Mouth/Throat: Oropharynx is clear and moist. No oropharyngeal exudate.   Eyes: Pupils are equal, round, and reactive to light. Conjunctivae and EOM are normal. No scleral icterus.   Neck: Normal range of motion. Neck supple. No JVD  present. No tracheal deviation present. No thyromegaly present.   Cardiovascular: Normal rate, regular rhythm and intact distal pulses. Exam reveals no gallop and no friction rub.   No murmur heard.  Pulmonary/Chest: Effort normal. No stridor. No respiratory distress. She has no wheezes. She has rales in the right middle field and the right lower field. She exhibits no tenderness.   Abdominal: Soft. Bowel sounds are normal. She exhibits no distension and no mass. There is no tenderness. There is no rebound and no guarding.   Musculoskeletal: Normal range of motion. She exhibits no edema.   Lymphadenopathy:     She has no cervical adenopathy.   Neurological: She is alert and oriented to person, place, and time. No cranial nerve deficit. Gait normal. Coordination normal.   Skin: Skin is warm and dry. No rash noted. She is not diaphoretic. No erythema. No pallor.   Clean clamshell incision   Psychiatric: Mood and affect normal.     Labs:  cbc, cmp, tacrolimus Latest Ref Rng & Units 8/16/2019 8/17/2019 8/22/2019   TACROLIMUS LVL 5.0 - 15.0 ng/mL 5.3 5.3 -   WHITE BLOOD CELL COUNT 3.90 - 12.70 K/uL 4.40 6.63 8.15   RBC 4.00 - 5.40 M/uL 2.39(L) 2.38(L) 3.47(L)   HEMOGLOBIN 12.0 - 16.0 g/dL 7.7(L) 8.0(L) 10.8(L)   HEMATOCRIT 37.0 - 48.5 % 24.6(L) 24.5(L) 34.6(L)   MCV 82 - 98 fL 103(H) 103(H) 100(H)   MCH 27.0 - 31.0 pg 32.2(H) 33.6(H) 31.1(H)   MCHC 32.0 - 36.0 g/dL 31.3(L) 32.7 31.2(L)   RDW 11.5 - 14.5 % 16.3(H) 16.1(H) 15.8(H)   PLATELETS 150 - 350 K/uL 114(L) 134(L) 191   MPV 9.2 - 12.9 fL 11.6 11.3 10.6   GRAN # 1.8 - 7.7 K/uL 4.0 6.3 6.7   LYMPH # 1.0 - 4.8 K/uL 0.2(L) 0.2(L) 1.0   MONO # 0.3 - 1.0 K/uL 0.1(L) 0.1(L) 0.2(L)   EOSINOPHIL% 0.0 - 8.0 % 0.0 0.0 0.0   BASOPHIL% 0.0 - 1.9 % 0.2 0.0 0.2   DIFFERENTIAL METHOD - Automated Automated Automated   SODIUM 136 - 145 mmol/L 136 - 138   POTASSIUM 3.5 - 5.1 mmol/L 4.7 - 4.4   CHLORIDE 95 - 110 mmol/L 106 - 105   CO2 23 - 29 mmol/L 21(L) - 24   GLUCOSE 70 - 110 mg/dL  273(H) - 187(H)   BUN BLD 6 - 20 mg/dL 18 - 16   CREATININE 0.5 - 1.4 mg/dL 0.9 - 1.1   CALCIUM 8.7 - 10.5 mg/dL 8.3(L) - 9.3   PROTEIN TOTAL 6.0 - 8.4 g/dL 6.2 - 6.9   ALBUMIN 3.5 - 5.2 g/dL 2.2(L) - 2.8(L)   BILIRUBIN TOTAL 0.1 - 1.0 mg/dL 0.2 - 0.3   ALK PHOS 55 - 135 U/L 148(H) - 153(H)   AST 10 - 40 U/L 8(L) - 9(L)   ALT 10 - 44 U/L 7(L) - 14   ANION GAP 8 - 16 mmol/L 9 - 9   EGFR IF AFRICAN AMERICAN >60 mL/min/1.73 m:2 >60.0 - >60.0   EGFR IF NON-AFRICAN AMERICAN >60 mL/min/1.73 m:2 >60.0 - >60.0     Pulmonary Function Tests 8/22/2019 8/14/2019 8/7/2019 7/31/2019 7/19/2019 6/6/2019 5/1/2019   FVC 1.21 1.34 1.22 1.19 1.45 1.42 1.45   FEV1 0.78 0.71 0.69 0.6 1.27 0.59 0.61   TLC (liters) - - - - - - -   DLCO (ml/mmHg sec) - - - - - - -   FVC% 37 39.3 35.6 34.7 42.5 44 42.5   FEV1% 28 24.4 23.9 20.7 43.6 21 20.9   FEF 25-75 - - - - - - 0.24   FEF 25-75% - - - - - - 7.4   TLC% - - - - - - -   RV - - - - - - -   RV% - - - - - - -   DLCO% - - - - - - -       Imaging:  Results for orders placed during the hospital encounter of 08/22/19   X-Ray Chest PA And Lateral    Narrative EXAMINATION:  XR CHEST PA AND LATERAL    CLINICAL HISTORY:  s/p bilateral lung transplant 6/12/2014; Lung transplant status    TECHNIQUE:  PA and lateral views of the chest were performed.    COMPARISON:  Chest radiograph 08/15/2019, 08:21 hours.  08/14/2019, 08:53 hours.  07/19/2019.    Chest CT: 08/14/2019.    FINDINGS:  Sternal sutures are intact and aligned similar to earlier studies in this patient status post bilateral sequential lung transplant x2.  Port in the right anterior chest wall as has attached catheter that extends to the SVC as seen on earlier studies.    Mediastinal structures are midline.  Prior studies have revealed patchy parenchymal opacity and consolidation in the dependent portions of both lungs as well as the posterior aspect of the right upper lobe.  Comparing today's examination to the chest radiographs of 08/14/2019  and 07/19/2019 raises the question of new parenchymal opacification in the medial segment of the right middle lobe.    I detect no other significant change.  There is a small amount of right pleural fluid similar to earlier studies.  I do not identify pneumothorax, pneumomediastinum, pneumoperitoneum or significant osseous abnormality.      Impression I raise the question of increased parenchymal attenuation in the medial segment of the middle lobe based on the appearance of the lateral radiograph today.    This report was flagged in Epic as abnormal.      Electronically signed by: Adela Davalos MD  Date:    08/22/2019  Time:    08:54       Assessment:  1. Encounter following organ transplant    2. Lung replaced by transplant    3. Immunosuppression    4. Prophylactic antibiotic    5. Infection due to Candida glabrata    6. Aspergillosis    7. Chronic pain syndrome    8. Pancreatic insufficiency      Plan:   1. FEV1 stable from previous and remains much lower than expected at this time post transplant.  This is likely reflective of deconditioning and recent infections.  CXR continues to show right lower opacities which are relatively unchanged.  Airspace disease on the left appears improved from previous.  Symptomatically, states that her respiratory symptoms are improving.  Underwent recent 6 week surveillance bronchoscopy which was negative for rejection.  Given abnormal CT chest with diffuse GGOs, she underwent 3 days of pulse steroids empirically.  Cultures are positive for unidentified yeast which is likely candida glabrata which she has consistently grown.  Bacterial cultures are negative.  She has completed therapy with Zerbaxa and Micafungin.  Encouraged increased activity and nutrition.  Will continue to monitor spirometry and chest imaging on routine visits.    2. Continue with Tacrolimus 1mg BID and Prednisone.  MMF has been on hold due to frequent infections.  Will follow-up Tac level and adjust as  needed.    3. Continue with Dapsone and Valgan.      4. Continue with Vori.  Positive aspergillus ag from 7/5/2019.  Repeats have been negative.      5. Continue with pain medications per psych and pain management.    6. Continue with pancreatic enzymes with meals.      7.  Follow up in 1 week.      Francisca Morin D.O.  Lung Transplant  Pulmonary/Critical Care Medicine

## 2019-08-23 NOTE — TELEPHONE ENCOUNTER
----- Message from Francisca Morin DO sent at 8/23/2019 10:53 AM CDT -----  Increase tac to 1.5mg am and 1mg pm.

## 2019-08-23 NOTE — PLAN OF CARE
Future Appointments   Date Time Provider Department Center   8/26/2019  9:00 AM DIABETES DISCHARGE CLINIC [facility] ENDODIA [facility]   8/26/2019  2:30 PM HOLLI Liu [facility] ENTERO [facility]   8/29/2019  8:00 AM [facility] OIC-XRAY [facility] XRAY [facility]   8/29/2019  8:20 AM LAB, TRANSPLANT [facility] LABTX [facility]   8/29/2019  8:45 AM PULMONARY FUNCTION [facility] PULMLAB [facility]   8/29/2019  9:00 AM Jake Alvarez MD [facility] LUNGTX [facility]   10/10/2019 11:30 AM Julio Berry MD [facility] PSYCH [facility]     Called patient to advise that if any issues arise with the wound vac to contact the [facility] line for after hours care instructions this weekend and to NOT contact [facility] as she has completed home health service needs for the wound vac care with [facility]. Spoke with the patients EMELY Castrejon and gave [facility] number [phone]. Confirmed that they were aware of the appt with the wound care nurse on Monday and he confirmed that he was aware. She has a follow up appt with Brittni DE LA GARZA in wound care clinic on Monday.

## 2019-08-23 NOTE — TELEPHONE ENCOUNTER
Received written order from Dr. Morin instructing patient to increase tacro to 1.5/1 mg from 1 mg bid.  My chart message sent to patient instructing her to make the above dose change.  Patient will have repeat labs on Tuesday 8/27/19.  Asked patient to contact us if she has any questions.

## 2019-08-26 PROBLEM — R23.9 ALTERATION IN SKIN INTEGRITY: Status: ACTIVE | Noted: 2019-01-01

## 2019-08-26 PROBLEM — R65.21 SEPTIC SHOCK: Status: ACTIVE | Noted: 2019-01-01

## 2019-08-26 PROBLEM — A41.9 SEPTIC SHOCK: Status: ACTIVE | Noted: 2019-01-01

## 2019-08-26 PROBLEM — J96.00 ACUTE RESPIRATORY FAILURE: Status: ACTIVE | Noted: 2019-01-01

## 2019-08-26 NOTE — ED NOTES
Lung tx paged in regards to patient PO status, patient unable to tolerate PO meds at this time. Awaiting call back.

## 2019-08-26 NOTE — ED PROVIDER NOTES
Encounter Date: 8/26/2019       History     Chief Complaint   Patient presents with    Respiratory Distress     29-year-old female with a history of cystic fibrosis status post 2 lung transplants presents with acute respiratory distress. Patient's significant other states she had a fever yesterday that broke spontaneously.  She is already on high level antibiotics.  This morning had fever up to 101 and worsening shortness of breath. EMS was called and transported to the ED.  CPAP placed in route.  Initial sats in the mid 70s.  Patient is unable to give ROS or significant medical history secondary to her critical illness.  Her caretaker assist with the medical history.  The patients available PMH, PSH, Social History, medications, allergies, and triage vital signs were reviewed just prior to their medical evaluation.        Review of patient's allergies indicates:   Allergen Reactions    Albuterol Palpitations    Colistin Anaphylaxis    Vancomycin analogues      Infusion reaction that does not resolve with slowing  Pt. States she can tolerate it when given with 50 mg Benadryl and ran over 3 hours    Neupogen [filgrastim] Other (See Comments)     Ostealgia after five daily doses of 300 mcg.      Bactrim [sulfamethoxazole-trimethoprim] Hives    Ceftazidime Hives     Pt stated can tolerate cefapine not ceftazidime    Ceftazidime     Dronabinol Other (See Comments)     Mental changes/hallucinations    Haldol [haloperidol lactate] Other (See Comments)     Seizure like activity    Nsaids (non-steroidal anti-inflammatory drug)      Cannot have due to lung transplant    Adhesive Rash     Cloth tape- please use tegaderm or paper tape    Aztreonam Rash    Ciprofloxacin Nausea And Vomiting     Projectile N/V, per patient.  Unwilling to retry therapy.     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     Past Surgical History:   Procedure Laterality Date    ABDOMINAL SURGERY      peg tube     [id number] N/A 5/19/2015    Performed by [facility]y [facility] OR    BIOPSY-BRONCHUS N/A 12/20/2016    Performed by Jake Alvarez MD at [facility] OR 2ND FLR    Bronchoscopy N/A 7/5/2019    Performed by Francisca Morin DO at [facility] OR 2ND [facility]LR    BRONCHOSCOPY Bilateral 12/11/2018    Performed by Francisca Morin DO at [facility] OR 2ND [facility]LR    BRONCHOSCOPY N/A 10/1/2018    Performed by Jake Alvarez MD at [facility] OR 2ND [facility]    BRONCHOSCOPY Bilateral 12/20/2016    Performed by Jake Alvarez MD at [facility] OR [facility]ND [facility]    BRONCHOSCOPY - flexible bronchoscopy with probable tissue biopsy CPT 59125 N/A 7/21/2015    Performed by Jake Alvarez MD at [facility] OR 2ND FLR    BRONCHOSCOPY - flexible bronchoscopy with probable tissue biospy CPT 91680 N/A 10/20/2015    Performed by Jake Alvarez MD at [facility] OR 2ND [facility]LR    BRONCHOSCOPY - flexible bronchoscopy with tissue biopsy CPT 55057 N/A 9/29/2015    Performed by Jake Alvarez MD at [facility] OR 2ND [facility]LR    BRONCHOSCOPY - flexible bronchoscopy with tissue biopsy CPT 16288 N/A 5/26/2015    Performed by Jake Alvarez MD at [facility] OR 1ST FLR    BRONCHOSCOPY flexible bronchoscopy with tissue biopsy N/A 9/8/2014    Performed by Jake Alvarez MD at [facility] OR 2ND FLR    BRONCHOSCOPY flexible with possible tissue biopsy N/A 8/8/2014    Performed by Jake Alvarez MD at [facility] OR 2ND [facility]LR    BRONCHOSCOPY, BAL, BIOPSIES N/A 3/20/2018    Performed by Jake Alvarez MD at [facility] OR 2ND FLR    BRONCHOSCOPY-FIBEROPTIC N/A 1/29/2016    Performed by  Joann Cifuentes DO at [facility] OR 2ND [facility]LR    BRONCHOSCOPY; Bronchoscopy with BAL and transbronchial biopsies under general anesthesia N/A 5/29/2018    [facility]    CHOLECYSTECTOMY  2016    CHOLECYSTECTOMY-LAPAROSCOPIC N/A 7/22/2016    [facility]    COLONOSCOPY N/A 5/3/2019    Performed by Juancho Rich MD at [facility] ENDO (2ND FLR)    ENDOMETRIAL ABLATION  2015    [facility]    ETHMOIDECTOMY Bilateral 4/9/2019    Performed by Adin Burks III, MD at [facility] OR 2ND FLR    FESS      FESS Bilateral 10/21/2013    Performed by Jared Whatley MD at [facility] OR [address] [place] [address]    FESS, USING COMPUTER-ASSISTED NAVIGATION N/A 4/9/2019    Performed by Adin Burks III, MD at [facility] OR [address] [place] [address]    FINE NEEDLE ASPIRATION (FNA)  of a cervical lymphadenopathy  1/29/2016    Per[facility]    flex bronchoscopy with probable tissue biopsy N/A 7/31/2014    Performed by [facility] at [facility]    flexible bronchoscopy with tissue biopsy N/A 12/11/2014    [facility]    flexible bronchoscopy with tissue biopsy  CPT 11158 N/A 8/15/2019    [facility]    flexible bronchoscopy with tissue biopsy CPT 05677  N/A 7/26/2019    Performed by Jake Alvarez MD at [facility] OR [address] [place] [address]    HYSTERECTOMY  04/2017    [facility]    INJECTION-VOCAL CORD Left 6/24/2014    Performed by Jared Whatley MD at [facility] OR [address] [place] [address]    [id number]-S-CATH to right chest and removal of portacath to left chests wall. Bilateral 6/24/2014    Performed by Zhen Dorado MD at [facility] OR [address] [place] [address]    LARYNX SURGERY  2016    LUNG TRANSPLANT Bilateral 6/12/14    MAXILLARY ANTROSTOMY  4/9/2019    Performed by Adin Burks III, MD at [facility] OR [address] [place] [address]    MYRINGOTOMY W/ TUBES Right 04/2017    MYRINGOTOMY WITH INSERTION OF PE TUBES Right 4/15/2017     Performed by Gary Null MD at [facility] OR 2ND FLR    PLACEMENT-TUBE-PEG  5/15/2014    [facility]    PORTACATH PLACEMENT Right     rt sc    REDO BILATERAL LUNG TRANSPLANT; CLAMSHELL Bilateral 6/18/2019    Performed by Jonathan Newby MD at [facility]    REMOVAL-TUBE-PEG  5/15/2014    Performed by David Moses MD at [facility]    RHC for lung re-transplant N/A 1/22/2019    Performed by Laura Rachel MD at [facility] CATH LAB    ROBOTIC ASSISTED LAPAROSCOPIC HYSTERECTOMY N/A 4/25/2017    Performed by [facility]y [facility] Jr[facility] at [facility] OR    SALPINGECTOMY Bilateral 2015    [facility]    SALPINGECTOMY-LAPAROSCOPIC Bilateral 5/19/2015    Performed by [facility] at [facility] OR    SINUS SURGERY FUNCTIONAL ENDOSCOPIC WITH NAVIGATION Bilateral 4/3/2017    [facility]    SINUS SURGERY FUNCTIONAL ENDOSCOPIC WITH NAVIGATION, 17840, 80344, 49245, 88385 Bilateral 2/5/2015    Performed by Cesar Olsen MD at [facility] OR [facility]    SPHENOIDECTOMY Bilateral 4/9/2019    Performed by Adin Burks III, MD at [facility] OR [address] [place] [address]    THYROPLASTY - Medialization laryngoplasty, cricothyroid subluxation, arytenoid repositioning Left 8/2/2016    Performed by Gary Null MD at [facility] OR 2ND FLR    TRANSPLANT-LUNG Bilateral 6/11/2014    Performed by [facility]olfo [facility]uston [facility] at [facility]     Family History   Problem Relation Age of Onset    Thrombocytopenia Maternal Grandfather     Drug abuse Maternal Grandfather     Diabetes Maternal Grandfather     Hypertension Maternal Grandmother     Cancer Maternal Grandmother         pancreatic cancer    Breast cancer Neg Hx     Colon cancer Neg Hx     Ovarian cancer Neg Hx     Cirrhosis Neg Hx      Social History     Tobacco Use    Smoking status: Never Smoker    Smokeless tobacco: Never Used   Substance Use Topics    Alcohol use: No     Comment: Has not had an alcoholic  drink in more than 2 months.    Drug use: No     Review of Systems   Unable to perform ROS: Severe respiratory distress       Physical Exam     Initial Vitals [08/26/19 0833]   BP Pulse Resp Temp SpO2   120/78 (!) 147 (!) 24 99.3 °F (37.4 °C) (!) 76 %      MAP       --         Physical Exam    Nursing note and vitals reviewed.  Constitutional: She appears well-developed and well-nourished. She is not diaphoretic. She appears distressed.   HENT:   Head: Normocephalic and atraumatic.   Nose: Nose normal.   Eyes: Conjunctivae are normal. Right eye exhibits no discharge. Left eye exhibits no discharge.   Neck: Normal range of motion. Neck supple.   Cardiovascular: Intact distal pulses. Exam reveals no gallop and no friction rub.    No murmur heard.  tachycardia   Pulmonary/Chest: She is in respiratory distress. She has no wheezes. She has rhonchi. She has no rales.   Diffuse rhonchi   Abdominal: Soft. She exhibits no distension. There is no tenderness. There is no rebound and no guarding.   Musculoskeletal: She exhibits no edema or tenderness.   Neurological: She is alert and oriented to person, place, and time. She has normal strength. GCS score is 15. GCS eye subscore is 4. GCS verbal subscore is 5. GCS motor subscore is 6.   Skin: Skin is warm and dry. No rash noted. No erythema.   Wound vac under right breast   Psychiatric: She has a normal mood and affect. Her behavior is normal. Judgment and thought content normal.         ED Course   Procedures  Labs Reviewed   CBC W/ AUTO DIFFERENTIAL - Abnormal; Notable for the following components:       Result Value    RBC 3.75 (*)     Hemoglobin 11.0 (*)     Mean Corpuscular Volume 102 (*)     Mean Corpuscular Hemoglobin Conc 28.8 (*)     RDW 15.8 (*)     Immature Granulocytes 1.4 (*)     Gran # (ANC) 9.4 (*)     Immature Grans (Abs) 0.17 (*)     Gran% 75.2 (*)     Mono% 3.3 (*)     All other components within normal limits   COMPREHENSIVE METABOLIC PANEL - Abnormal;  Notable for the following components:    Sodium 134 (*)     CO2 19 (*)     Glucose 203 (*)     BUN, Bld 31 (*)     Creatinine 2.4 (*)     Albumin 2.6 (*)     Alkaline Phosphatase 161 (*)     ALT 9 (*)     eGFR if  30.3 (*)     eGFR if non  26.3 (*)     All other components within normal limits   MAGNESIUM - Abnormal; Notable for the following components:    Magnesium 1.3 (*)     All other components within normal limits   LACTIC ACID, PLASMA - Abnormal; Notable for the following components:    Lactate (Lactic Acid) 3.5 (*)     All other components within normal limits   PROCALCITONIN - Abnormal; Notable for the following components:    Procalcitonin 79.71 (*)     All other components within normal limits   ISTAT PROCEDURE - Abnormal; Notable for the following components:    POC PH 7.245 (*)     POC HCO3 17.6 (*)     POC SATURATED O2 79 (*)     POC TCO2 19 (*)     All other components within normal limits   CULTURE, BLOOD   CULTURE, BLOOD   CULTURE, RESPIRATORY  - CYSTIC FIBROSIS   RESPIRATORY INFECTION PANEL, NASOPHARYNGEAL   PROTIME-INR   TROPONIN I   TSH   ASPERGILLUS (GALACTOMANNAN) AG, SERUM   CMV DNA, QUANTITATIVE, PCR   COMPREHENSIVE METABOLIC PANEL   FUNGITELL ASSAY FOR (1.3)-B-D-GLUCANS   CMV DNA, QUANTITATIVE, PCR   TYPE & SCREEN   TYPE & SCREEN     EKG Readings: (Independently Interpreted)   Initial Reading: No STEMI. Rhythm: Sinus Tachycardia. Heart Rate: 141. Ectopy: No Ectopy. Conduction: Normal.   Nonspecific ST and T wave changes     ECG Results          EKG 12-lead (In process)  Result time 08/26/19 09:46:32    In process by Interface, Lab In [facility] (08/26/19 09:46:32)                 Narrative:    Test Reason : R00.0,    Vent. Rate : 141 BPM     Atrial Rate : 141 BPM     P-R Int : 070 ms          QRS Dur : 080 ms      QT Int : 344 ms       P-R-T Axes : 064 091 017 degrees     QTc Int : 526 ms    Sinus tachycardia with short UT  Rightward axis  ST and T wave  abnormality, consider inferior ischemia  Abnormal ECG  When compared with ECG of 22-JUN-2019 15:13,  HI interval has decreased  Vent. rate has increased BY  70 BPM  ST now depressed in Inferior leads  ST now depressed in Lateral leads  Inverted T waves have replaced nonspecific T wave abnormality in Inferior  leads    Referred By: [email or web address]   SELF           Confirmed By:                             Imaging Results          X-Ray Chest AP Portable (Final result)  Result time 08/26/19 09:28:03    Final result by Adela Davalos MD (08/26/19 09:28:03)                 Impression:      Abnormal chest radiograph.      Electronically signed by: Adela Davalos MD  Date:    08/26/2019  Time:    09:28             Narrative:    EXAMINATION:  XR CHEST AP PORTABLE    CLINICAL HISTORY:  Hypoxemia    TECHNIQUE:  Single frontal view of the chest was performed.    COMPARISON:  Chest radiograph 08/22/2019.    FINDINGS:  Mediastinal structures are midline allowing for slight right posterior oblique rotation.    Port again identified overlying the right axilla with its catheter tip near the junction of SVC and right atrium.  Sternal sutures are intact and aligned.  Extrinsic devices overlie the image obscuring detail.    There is widespread fine reticulonodular pattern in perihilar distribution that is new since 08/22/2019 compatible with interstitial pulmonary edema.  There is increased homogeneous opacity at the lateral aspect of the mid and inferior portions of the right hemithorax suggesting right pleural fluid and less likely parenchymal consolidation.    I detect no pneumothorax, pneumomediastinum, pneumoperitoneum or significant osseous abnormality.                              X-Rays:   Independently Interpreted Readings:   Other Readings:  Reviewed CXR image, R>L infiltrate    Medical Decision Making:   History:   I obtained history from: someone other than patient and EMS provider.  Old Medical Records: I decided to obtain  old medical records.  Independently Interpreted Test(s):   I have ordered and independently interpreted X-rays - see prior notes.  I have ordered and independently interpreted EKG Reading(s) - see prior notes  Clinical Tests:   Lab Tests: Ordered and Reviewed  Radiological Study: Ordered and Reviewed  Medical Tests: Ordered and Reviewed  ED Management:  29-year-old female status post lung transplant with known cystic fibrosis presents with respiratory distress and reported febrile illness.  Tachycardic, hypoxic, and hypotensive on initial presentation.  Physical exam as above.  Labs with elevated lactic.  Many labs still pending.  Chest x-ray and EKG as above.  BiPAP placed with improved symptoms. Transition high-flow nasal cannula.  Ordered steroids, magnesium, and fluids.  Patient had worsening hypotension and required pressors.  Discussed with lung transplant who emergently evaluated at bedside.  They ordered antibiotics.  Re-eval x 3.  Admitted to lung transplant care of ICU.  Did bedside teaching.  All questions answered.  Patient acknowledges understanding.  Other:   I have discussed this case with another health care provider.       <> Summary of the Discussion: Lung transplant              Attending Attestation:         Attending Critical Care:   Critical Care Times:   Direct Patient Care (initial evaluation, reassessments, and time considering the case)................................................................30 minutes.   Additional History from reviewing old medical records or taking additional history from the family, EMS, PCP, etc.......................5 minutes.   Ordering, Reviewing, and Interpreting Diagnostic Studies...............................................................................................................5 minutes.    Documentation..................................................................................................................................................................................5 minutes.   Consultation with other Physicians. .................................................................................................................................................10 minutes.   Consultation with the patient's family directly relating to the patient's condition, care, and DNR status (when patient unable)......5 minutes.   ==============================================================  · Total Critical Care Time - exclusive of procedural time: 60 minutes.  ==============================================================  Critical care was necessary to treat or prevent imminent or life-threatening deterioration of the following conditions: respiratory failure.   The following critical care procedures were done by me (see procedure notes): pulse oximetry.   Critical care was time spent personally by me on the following activities: obtaining history from patient or relative, examination of patient, ordering lab, x-rays, and/or EKG, development of treatment plan with patient or relative, ordering and performing treatments and interventions, evaluation of patient's response to treatment, discussion with consultants, interpretation of cardiac measurements, re-evaluation of patient's conition, ventilator management and end of life discussions.   Critical Care Condition: critical                  Clinical Impression:   Final diagnoses:  [R09.02] Hypoxia  [R00.0] Tachycardia  [J22] LRTI (lower respiratory tract infection)  [J96.00] Acute respiratory failure      Disposition:   Disposition: Admitted  Condition: Critical                        Donal Guallpa MD  08/26/19 1046

## 2019-08-26 NOTE — HPI
Reason for Consult: Management of Steroid induced Hyperglycemia     Surgical Procedure and Date:   Bilateral re-transplant of Lung    Diabetes diagnosis year: No dx of dm. Steroid induced hyperglycemia secondary to lung transplant treatment.     Home Diabetes Medications:    Januvia 100 mg daily   Novolog 4 units TIDWM    How often checking glucose at home? Not checking  BG readings on regimen: Unknown  Hypoglycemia on the regimen?  No  Missed doses on regimen?  Yes    Diabetes Complications include:     Hyperglycemia    Complicating diabetes co morbidities:   Glucocorticoid use       HPI:   Patient is a 29 y.o. female with a diagnosis of   MsAditi Ibarra is a 28 yo female s/p bilateral re-transplant (6/18/19) who presented to the ED in acute respiratory distress 08/26/2019. Per patient's caregiver, patient was doing well since her hospital discharge, but reports fevers (tmax 101.5) x 2 days, cough with occasional thin sputum, and severe dyspnea requiring >5L oxygen over the weekend. Patient's caregiver also reports poor appetite and PO intake over the last week. Patient has previous history of steroid sensitivity during admission in regards to glycemic control. Endocrinology consulted to manage hyperglycemia during admssion.     Lab Results   Component Value Date    HGBA1C 4.7 01/09/2019

## 2019-08-26 NOTE — HPI
Ms. Ibarra is a 30 yo female s/p bilateral re-transplant (6/18/19) who presented to the ED in acute respiratory distress. Per patient's caregiver, patient was doing well since her hospital discharge, but reports fevers (tmax 101.5) x 2 days, cough with occasional thin sputum, and severe dyspnea requiring >5L oxygen over the weekend. Patient's caregiver also reports poor appetite and PO intake over the last week. Upon arrival to ED, oxygen saturations were in mid 70s while on NRB and patient was placed on BiPAP which was transitioned to 25L comfort flow at 100% FiO2. Patient tachycardic and hypotensive, and received 1L IVF and was started on levo and vaso to maintain MAPs >65. VBG with metabolic acidosis (7.245/40.7/51/17). She was transferred to the ICU, intubated, and started on Zerbaxa/Vancomycin.

## 2019-08-26 NOTE — ED NOTES
Patient taken off of BiPap per lung transplant via respiratory therapist to high flow comfort 15 L nasal cannula

## 2019-08-26 NOTE — ED NOTES
Juanita Ibarra, a 29 y.o. female presents to the ED via  EMS presents with respiratory distress on BiPap. Patient is on her 3rd pair of lungs with the last double lung transplant 6 weeks ago. Patients room air saturation was 75% with BiPap saturation of 84%    Triage note:  Chief Complaint   Patient presents with    Respiratory Distress     Review of patient's allergies indicates:   Allergen Reactions    Albuterol Palpitations    Colistin Anaphylaxis    Vancomycin analogues      Infusion reaction that does not resolve with slowing  Pt. States she can tolerate it when given with 50 mg Benadryl and ran over 3 hours    Neupogen [filgrastim] Other (See Comments)     Ostealgia after five daily doses of 300 mcg.      Bactrim [sulfamethoxazole-trimethoprim] Hives    Ceftazidime Hives     Pt stated can tolerate cefapine not ceftazidime    Ceftazidime     Dronabinol Other (See Comments)     Mental changes/hallucinations    Haldol [haloperidol lactate] Other (See Comments)     Seizure like activity    Nsaids (non-steroidal anti-inflammatory drug)      Cannot have due to lung transplant    Adhesive Rash     Cloth tape- please use tegaderm or paper tape    Aztreonam Rash    Ciprofloxacin Nausea And Vomiting     Projectile N/V, per patient.  Unwilling to retry therapy.     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

## 2019-08-26 NOTE — CARE UPDATE
Patient intubated with 8.0 ETT secured with tube figueroa at the 21 lip.  Patient placed on 980 vent on documented settings.  Will continue to monitor.       08/26/19 1109   PRE-TX-O2   O2 Device (Oxygen Therapy) ventilator   $ Is the patient on Low Flow Oxygen? Yes   Oxygen Concentration (%) 100   SpO2 (!) 88 %   Pulse Oximetry Type Continuous   $ Pulse Oximetry - Multiple Charge Pulse Oximetry - Multiple   Pulse (!) 128   Resp 10   /66   Vital Capacity   Vital Capacity (mL) 0        Airway - Non-Surgical 08/26/19 Endotracheal Tube   Placement Date: 08/26/19   Method of Intubation: Glidescope  Airway Device: Endotracheal Tube   Secured at 21 cm   Measured At Lips   Secured Location Right   Secured by Commercial tube figueroa   Bite Block none   Site Condition Cool;Dry   Status Intact;Secured;Patent   Site Assessment Clean;Dry;No bleeding;No drainage   Equipment Change   Tube Securement Device Change Next Due 08/27/19   Closed Suction System Next Due 08/27/19   Vent Select   Conventional Vent Y   Intubation? SICU   Ventilator Initiated Yes   $ Ventilator Initial 1   Charged w/in last 24h YES   Preset Conventional Ventilator Settings   Vent ID [id number]   Vent Type    Ventilation Type PC   Vent Mode A/C   Humidity HME   Set Rate 25 bmp   PEEP/CPAP 12 cmH20   Set Inspiratory Pressure 22 cmH20   Insp Time 0.8 Sec(s)   Insp Rise Time  50 %   Trigger Sensitivity Flow/I-Trigger 3 L/min   Patient Ventilator Parameters   Resp Rate Total 25 br/min   Peak Airway Pressure 29 cmH2O   Mean Airway Pressure 16 cmH20   Plateau Pressure 0 cmH20   Exhaled Vt 304 mL   Total Ve 7.62 mL   I:E Ratio Measured 1:2.00   Total PEEP 10 cmH20   Inspired Tidal Volume (VTI) 296 mL   Conventional Ventilator Alarms   Alarms On Y   Ve High Alarm 18 L/min   Ve Low Alarm 4 L/min   Resp Rate High Alarm 40 br/min   Press High Alarm 50 cmH2O   Apnea Rate 18   Apnea Oxygen Concentration  100   Ready to Wean/Extubation Screen   FIO2<=50 (chart decimal)  (!) 1   MV<16L (chart vol.) 7.62   PEEP <=8 (chart #) (!) 12   Ready to Wean Parameters   F/VT Ratio<105 (RSBI) (!) 32.89

## 2019-08-26 NOTE — SUBJECTIVE & OBJECTIVE
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    Cystic fibrosis of the lung     Ear infection     Hypertension     Migraine headache     MRSA (methicillin resistant Staphylococcus aureus) carrier     Osteopenia     Other specified disease of pancreas     Pain disorder     Seizures 2013    Sinusitis, chronic     Vocal cord paralysis 06/2014    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       Past Surgical History:   Procedure Laterality Date    ABDOMINAL SURGERY      peg tube     [id number]-[id number]-[id number] N/A 5/19/2015    Performed by [facility]y Dias Jr[facility] at [facility] OR    BIOPSY-BRONCHUS N/A 12/20/2016    Performed by Jake [facility]varez MD at [facility] OR 2ND FLR    Bronchoscopy N/A 7/5/2019    Performed by Francisca Morin DO at [facility] OR 2ND FLR    BRONCHOSCOPY Bilateral 12/11/2018    Performed by Francisca Morin DO at [facility] OR 2ND FLR    BRONCHOSCOPY N/A 10/1/2018    Performed by Jake Alvarez MD at [facility] OR 2ND FLR    BRONCHOSCOPY Bilateral 12/20/2016    Performed by Jake Alvarez MD at [facility] OR 2ND FLR    BRONCHOSCOPY - flexible bronchoscopy with probable tissue biopsy CPT 22695 N/A 7/21/2015    Performed by Jake Alvarez MD at [facility] OR 2ND FLR    BRONCHOSCOPY - flexible bronchoscopy with probable tissue biospy CPT 79753 N/A 10/20/2015    Performed by Jake Alvarez MD at [facility] OR 2ND FLR    BRONCHOSCOPY - flexible bronchoscopy with tissue biopsy CPT 78649 N/A 9/29/2015    Performed by Jake Alvarez MD at [facility] OR 2ND FLR    BRONCHOSCOPY - flexible bronchoscopy with tissue biopsy CPT 32705 N/A 5/26/2015    Performed by Jake Alvarez MD at [facility] OR 1ST FLR    BRONCHOSCOPY flexible bronchoscopy with tissue biopsy N/A 9/8/2014    Performed by Jake Alvarez MD at [facility] OR 2ND FLR    BRONCHOSCOPY flexible with possible tissue biopsy N/A 8/8/2014    Performed by Jake Alvarez MD at [facility] OR 2ND FLR     BRONCHOSCOPY, BAL, BIOPSIES N/A 3/20/2018    Performed by Jake [facility]varez MD at [facility] OR 2ND FLR    BRONCHOSCOPY-FIBEROPTIC N/A 1/29/2016    Performed by Joann Cifuentes DO at [facility] OR 2ND FLR    BRONCHOSCOPY; Bronchoscopy with BAL and transbronchial biopsies under general anesthesia N/A 5/29/2018    [facility]    CHOLECYSTECTOMY  2016    CHOLECYSTECTOMY-LAPAROSCOPIC N/A 7/22/2016    Performed by Joshua Goldberg, MD at [facility] OR 2ND FLR    COLONOSCOPY N/A 5/3/2019    Performed by Juancho Rich MD at [facility] ENDO (2ND FLR)    ENDOMETRIAL ABLATION  2015    KJB    ETHMOIDECTOMY Bilateral 4/9/2019    [facility]    FESS      FESS Bilateral 10/21/2013    [facility]    FESS, USING COMPUTER-ASSISTED NAVIGATION N/A 4/9/2019    Performed by Adin Burks III, MD at [facility] OR [address] [place] [address]    FINE NEEDLE ASPIRATION (FNA)  of a cervical lymphadenopathy  1/29/2016    [facility]    flex bronchoscopy with probable tissue biopsy N/A 7/31/2014    Performed by [facility] Diagnostic Provider at [facility] OR 2ND FLR    flexible bronchoscopy with tissue biopsy N/A 12/11/2014    Performed by Jake Alvarez MD at [facility] OR [facility]    flexible bronchoscopy with tissue biopsy  CPT 59406 N/A 8/15/2019    Performed by Jake Alvarez MD at [facility] OR 2ND FLR    flexible bronchoscopy with tissue biopsy CPT 16337  N/A 7/26/2019    [facility]    HYSTERECTOMY  04/2017    [facility]    INJECTION-VOCAL CORD Left 6/24/2014    Performed by Jared Whatley MD at [facility] OR [address] [place] [address]    [id number]-[id number]-[id number]-CATH to right chest and removal of portacath to left chests wall. Bilateral 6/24/2014    [facility]    LARYNX SURGERY  2016    LUNG TRANSPLANT Bilateral 6/12/14    MAXILLARY ANTROSTOMY  4/9/2019     [facility]    MYRINGOTOMY W/ TUBES Right 04/2017    MYRINGOTOMY WITH INSERTION OF PE TUBES Right 4/15/2017    Per[facility]    PLACEMENT-TUBE-PEG  5/15/2014    Performed by David Moses MD at [facility] OR [address] [place] [address]    PORTACATH PLACEMENT Right     rt sc    REDO BILATERAL LUNG TRANSPLANT; CLAMSHELL Bilateral 6/18/2019    Performed by Jonathan Newby MD at [facility] OR [address] [place] [address]    REMOVAL-TUBE-PEG  5/15/2014    Performed by David Moses MD at [facility] OR [address] [place] [address]    RHC for lung re-transplant N/A 1/22/2019    Performed by Laura Rachel MD at [facility] CATH LAB    ROBOTIC ASSISTED LAPAROSCOPIC HYSTERECTOMY N/A 4/25/2017    Performed by [facility]y Dias Jr., MD at [facility] OR    SALPINGECTOMY Bilateral 2015    K[facility]    SALPINGECTOMY-LAPAROSCOPIC Bilateral 5/19/2015    Performed by Deny Dias Jr., MD at [facility] OR    SINUS SURGERY FUNCTIONAL ENDOSCOPIC WITH NAVIGATION Bilateral 4/3/2017    Performed by Cesar Olsen MD at [facility] OR [address] [place] [address]    SINUS SURGERY FUNCTIONAL ENDOSCOPIC WITH NAVIGATION, 64526, 27141, 46816, 83601 Bilateral 2/5/2015    Per[facility]    SPHENOIDECTOMY Bilateral 4/9/2019    Performed by Adin Burks III, MD at [facility] OR [address] [place] [address]    THYROPLASTY - Medialization laryngoplasty, cricothyroid subluxation, arytenoid repositioning Left 8/2/2016    [facility]    TRANSPLANT-LUNG Bilateral 6/11/2014    Performed by Adolfo Huston MD at [facility] OR [address] [place] [address]       Review of patient's allergies indicates:   Allergen Reactions    Albuterol Palpitations    Colistin Anaphylaxis    Vancomycin analogues      Infusion reaction that does not resolve with slowing  Pt. States she can tolerate it when given with 50 mg Benadryl and ran over 3 hours    Neupogen [filgrastim] Other (See Comments)     Ostealgia after five daily doses of 300 mcg.       Bactrim [sulfamethoxazole-trimethoprim] Hives    Ceftazidime Hives     Pt stated can tolerate cefapine not ceftazidime    Ceftazidime     Dronabinol Other (See Comments)     Mental changes/hallucinations    Haldol [haloperidol lactate] Other (See Comments)     Seizure like activity    Nsaids (non-steroidal anti-inflammatory drug)      Cannot have due to lung transplant    Adhesive Rash     Cloth tape- please use tegaderm or paper tape    Aztreonam Rash    Ciprofloxacin Nausea And Vomiting     Projectile N/V, per patient.  Unwilling to retry therapy.       PTA Medications   Medication Sig    blood sugar diagnostic Strp Preferred by insurance. Testing BG 4 times daily.    blood-glucose meter Misc Use to check blood glucose 4 (four) times daily.    calcium-vitamin D3 (OS-KATY 500 + D3) 500 mg(1,250mg) -200 unit per tablet Take 1 tablet by mouth 2 (two) times daily with meals.    dapsone 100 MG Tab Take 1 tablet (100 mg total) by mouth once daily.    diphenhydrAMINE (BENADRYL) 50 MG tablet Take 1 tablet (50 mg total) by mouth every 6 (six) hours as needed for Itching.    fexofenadine (ALLEGRA) 180 MG tablet Take 180 mg by mouth once daily.    fluticasone propionate (FLONASE) 50 mcg/actuation nasal spray INSERT 2 SPRAYS IN EACH NOSTRIL DAILY    folic acid (FOLVITE) 1 MG tablet Take 1 tablet (1,000 mcg total) by mouth once daily.    gabapentin (NEURONTIN) 300 MG capsule Take 1 capsule (300 mg total) by mouth 3 (three) times daily.    hydrocortisone 1 % cream Apply topically 2 (two) times daily.    inhalation spacing device (JumpMusic) USE AS DIRECTED    insulin aspart U-100 (NOVOLOG) 100 unit/mL (3 mL) InPn pen Inject 1-10 Units into the skin before meals and at bedtime as needed (Hyperglycemia).    lancets 33 gauge Misc Use to check blood glucose four times daily    levalbuterol (XOPENEX) 1.25 mg/3 mL nebulizer solution Take 3 mLs (1.25 mg total) by nebulization every 8 (eight) hours as needed  "for Wheezing or Shortness of Breath. Rescue    lipase-protease-amylase (CREON) 36,000-114,000- 180,000 unit CpDR Take 4 capsules by mouth 3 (three) times daily with meals. Take 3 with snacks as needed. (Patient taking differently: Take 5 capsules by mouth 3 (three) times daily with meals. )    LORazepam (ATIVAN) 1 MG tablet Take 1 tablet (1 mg total) by mouth every 8 (eight) hours as needed for Anxiety.    magnesium oxide (MAG-OX) 400 mg (241.3 mg magnesium) tablet Take 1 tablet (400 mg total) by mouth 3 (three) times daily.    metoprolol tartrate (LOPRESSOR) 25 MG tablet Take 1 tablet (25 mg total) by mouth 2 (two) times daily.    montelukast (SINGULAIR) 10 mg tablet Take 1 tablet (10 mg total) by mouth once daily.    morphine (MS CONTIN) 15 MG 12 hr tablet Take 1 tablet by mouth twice daily    multivit,min52-folic-vitK-cQ10 (AQUADEKS) 100-700-10 mcg-mcg-mg Cap cap 1 tab twice daily    omeprazole (PRILOSEC) 40 MG capsule Take 1 capsule (40 mg total) by mouth once daily.    ondansetron (ZOFRAN) 8 MG tablet Take 1 tablet (8 mg total) by mouth every 12 (twelve) hours as needed for Nausea.    oxyCODONE (ROXICODONE) 5 MG immediate release tablet take 1 tablet by mouth four times a day (Patient taking differently: 10 mg every 6 (six) hours as needed. )    pen needle, diabetic 32 gauge x 5/32" Ndle Injecting 3 times a day    polyethylene glycol (GLYCOLAX) 17 gram PwPk Take 17 g by mouth 2 (two) times daily.    predniSONE (DELTASONE) 5 MG tablet Take 3 tablets (15 mg total) by mouth once daily.    QUEtiapine (SEROQUEL) 25 MG Tab Take 1 tablet by mouth in morning and 2 tablets at bedtime    SITagliptin (JANUVIA) 100 MG Tab Take 1 tablet (100 mg total) by mouth once daily.    tacrolimus (PROGRAF) 0.5 MG Cap Take 1 capsule (0.5 mg total) by mouth every morning. Total daily dose is 1.5 mg every morning and 1 mg every evening.    tacrolimus (PROGRAF) 1 MG Cap Take 1 capsule (1 mg total) by mouth every 12 " (twelve) hours.    tiZANidine (ZANAFLEX) 4 MG tablet Take 1 tablet (4 mg total) by mouth 2 (two) times daily.    topiramate (TOPAMAX) 25 MG tablet Take 1 tablet (25 mg total) by mouth 2 (two) times daily    valGANciclovir (VALCYTE) 450 mg Tab Take 1 tablet (450 mg total) by mouth 2 (two) times daily.    venlafaxine (EFFEXOR-XR) 150 MG Cp24 Take 1 capsule (150 mg total) by mouth once daily.    venlafaxine (EFFEXOR-XR) 37.5 MG 24 hr capsule Take 1 capsule (37.5 mg total) by mouth once daily.    voriconazole (VFEND) 200 MG Tab Take 1 tablet (200 mg total) by mouth 2 (two) times daily.     Family History     Problem Relation (Age of Onset)    Cancer Maternal Grandmother    Diabetes Maternal Grandfather    Drug abuse Maternal Grandfather    Hypertension Maternal Grandmother    Thrombocytopenia Maternal Grandfather        Tobacco Use    Smoking status: Never Smoker    Smokeless tobacco: Never Used   Substance and Sexual Activity    Alcohol use: No     Comment: Has not had an alcoholic drink in more than 2 months.    Drug use: No    Sexual activity: Yes     Partners: Male     Birth control/protection: See Surgical Hx     Comment: current partner since Oct 2016     Review of Systems   Unable to perform ROS: Severe respiratory distress     Objective:     Vital Signs (Most Recent):  Temp: 98.9 °F (37.2 °C) (08/26/19 1045)  Pulse: (!) 116 (08/26/19 1215)  Resp: (!) 26 (08/26/19 1215)  BP: 100/60 (08/26/19 1215)  SpO2: (!) 87 % (08/26/19 1215) Vital Signs (24h Range):  Temp:  [98.9 °F (37.2 °C)-99.3 °F (37.4 °C)] 98.9 °F (37.2 °C)  Pulse:  [113-147] 116  Resp:  [10-35] 26  SpO2:  [76 %-95 %] 87 %  BP: ()/(44-86) 100/60     Weight: 52.5 kg (115 lb 11.9 oz)  Body mass index is 22.6 kg/m².      Intake/Output Summary (Last 24 hours) at 8/26/2019 1240  Last data filed at 8/26/2019 1207  Gross per 24 hour   Intake 683.33 ml   Output 30 ml   Net 653.33 ml       Physical Exam   Constitutional: She appears  well-developed and well-nourished. She appears lethargic. She has a sickly appearance. She appears distressed. Face mask in place.   HENT:   Head: Normocephalic.   Nose: Nose normal.   Eyes: Conjunctivae are normal. No scleral icterus.   Neck: Normal range of motion. Neck supple. No JVD present. No tracheal deviation present.   Cardiovascular: Regular rhythm and normal heart sounds. Tachycardia present. Exam reveals no gallop and no friction rub.   No murmur heard.  Pulmonary/Chest: She is in respiratory distress. She has decreased breath sounds in the right lower field. She has no wheezes. She has rhonchi in the right upper field, the right middle field, the left upper field and the left middle field. She has no rales.   Abdominal: Soft. Bowel sounds are normal. She exhibits no distension. There is no tenderness.   Musculoskeletal: Normal range of motion. She exhibits no edema, tenderness or deformity.   Neurological: She appears lethargic.   Skin: Skin is warm and dry. No erythema. There is pallor.   Nursing note and vitals reviewed.      Significant Labs:  CBC:  Recent Labs   Lab 08/26/19  0847   WBC 12.51   RBC 3.75*   HGB 11.0*   HCT 38.2      *   MCH 29.3   MCHC 28.8*     BMP:  Recent Labs   Lab 08/26/19  0847   *   K 4.4      CO2 19*   BUN 31*   CREATININE 2.4*   CALCIUM 9.3        I have reviewed all pertinent labs within the past 24 hours.    Diagnostic Results:  Labs: Reviewed  X-Ray: Reviewed

## 2019-08-26 NOTE — ED NOTES
Carlotta, lung tx, notified that patient unable to tolerate PO intake at this time. Carlotta states for all PO meds to be held at this time.

## 2019-08-26 NOTE — H&P
[facility]  Lung Transplant  H&P    Patient Name: Juanita Ibarra  MRN: [id number]  Admission Date: 8/26/2019  Attending Physician: Jake Alvarez MD  Primary Care Provider: Primary Doctor No     Subjective:     History of Present Illness:  Ms. Ibarra is a 28 yo female s/p bilateral re-transplant (6/18/19) who presented to the ED in acute respiratory distress. Per patient's caregiver, patient was doing well since her hospital discharge, but reports fevers (tmax 101.5) x 2 days, cough with occasional thin sputum, and severe dyspnea requiring >5L oxygen over the weekend. Patient's caregiver also reports poor appetite and PO intake over the last week. Upon arrival to ED, oxygen saturations were in mid 70s while on NRB and patient was placed on BiPAP which was transitioned to 25L comfort flow at 100% FiO2. Patient tachycardic and hypotensive, and received 1L IVF and was started on levo and vaso to maintain MAPs >65. VBG with metabolic acidosis (7.245/40.7/51/17). She was transferred to the ICU, intubated, and started on Zerbaxa/Vancomycin.     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 OR    BIOPSY-BRONCHUS N/A 12/20/2016    Performed by Jake [facility]varez MD at [facility] OR 2ND FLR    Bronchoscopy N/A 7/5/2019    Performed by Francisca Morin DO at [facility] OR 2ND FLR    BRONCHOSCOPY Bilateral  12/11/2018    Performed by Francisca Morin DO at [facility] OR 2ND FLR    BRONCHOSCOPY N/A 10/1/2018    Performed by Jake Alvarez MD at [facility] OR 2ND FLR    BRONCHOSCOPY Bilateral 12/20/2016    Performed by Jake Alvarez MD at [facility] OR 2ND FLR    BRONCHOSCOPY - flexible bronchoscopy with probable tissue biopsy CPT 08279 N/A 7/21/2015    Performed by Jake Alvarez MD at [facility] OR 2ND FLR    BRONCHOSCOPY - flexible bronchoscopy with probable tissue biospy CPT 02183 N/A 10/20/2015    Performed by Jake Alvarez MD at [facility] OR 2ND FLR    BRONCHOSCOPY - flexible bronchoscopy with tissue biopsy CPT 44850 N/A 9/29/2015    Performed by Jake Alvarez MD at [facility] OR 2ND FLR    BRONCHOSCOPY - flexible bronchoscopy with tissue biopsy CPT 46482 N/A 5/26/2015    Performed by Jake Alvarez MD at [facility] OR 1ST FLR    BRONCHOSCOPY flexible bronchoscopy with tissue biopsy N/A 9/8/2014    Performed by Jake Alvarez MD at [facility] OR 2ND FLR    BRONCHOSCOPY flexible with possible tissue biopsy N/A 8/8/2014    Performed by Jake Alvarez MD at [facility] OR 2ND FLR    BRONCHOSCOPY, BAL, BIOPSIES N/A 3/20/2018    Performed by Jake Alvarez MD at [facility] OR 2ND FLR    BRONCHOSCOPY-FIBEROPTIC N/A 1/29/2016    Performed by Joann Cifuentes DO at [facility] OR 2ND FLR    BRONCHOSCOPY; Bronchoscopy with BAL and transbronchial biopsies under general anesthesia N/A 5/29/2018    Performed by Jake Alvarez MD at [facility] OR 2ND FLR    CHOLECYSTECTOMY  2016    CHOLECYSTECTOMY-LAPAROSCOPIC N/A 7/22/2016    Performed by Joshua Goldberg, MD at [facility] OR 2ND FLR    COLONOSCOPY N/A 5/3/2019    Performed by Juancho Rich MD at [facility] ENDO (2ND FLR)    ENDOMETRIAL ABLATION  2015    KJB    ETHMOIDECTOMY Bilateral 4/9/2019    Performed by Adin Burks III, MD at [facility] OR 2ND FLR    FESS      FESS Bilateral 10/21/2013    Performed by Jared Whatley MD at [facility] OR 2ND FLR    FESS, USING COMPUTER-ASSISTED NAVIGATION  N/A 4/9/2019    Performed by [place] [facility], [facility] at [facility]    FINE NEEDLE ASPIRATION (FNA)  of a cervical lymphadenopathy  1/29/2016    Performed by Joann Cifuentes DO at [facility] OR 2ND FLR    flex bronchoscopy with probable tissue biopsy N/A 7/31/2014    Performed by [facility] Diagnostic Provider at [facility] OR 2ND FLR    flexible bronchoscopy with tissue biopsy N/A 12/11/2014    Performed by Jake Alvarez MD at [facility] OR [facility] FLR    flexible bronchoscopy with tissue biopsy  CPT 77648 N/A 8/15/2019    Performed by Jake Alvarez MD at [facility] OR [facility] FLR    flexible bronchoscopy with tissue biopsy CPT 95526  N/A 7/26/2019    Performed by Jake Alvarez MD at [facility] OR [facility] FLR    HYSTERECTOMY  04/2017    [facility]    INJECTION-VOCAL CORD Left 6/24/2014    Performed [facility] Jared [facility]    [id number]-O-CATH to right chest and removal of portacath to left chests wall. Bilateral 6/24/2014    Performed by Zhen Dorado MD at [facility] OR 2ND FLR    LARYNX SURGERY  2016    LUNG TRANSPLANT Bilateral 6/12/14    MAXILLARY ANTROSTOMY  4/9/2019    Performed by [place] Burks III, MD at [facility] OR [address] [place] [address]    MYRINGOTOMY W/ TUBES Right 04/2017    MYRINGOTOMY WITH INSERTION OF PE TUBES Right 4/15/2017    Performed by Gary Null [facility] at [facility]    PLACEMENT-TUBE-PEG  5/15/2014    Performed by David Moses [facility] at [facility]    PORTACATH PLACEMENT Right     rt sc    REDO BILATERAL LUNG TRANSPLANT; CLAMSHELL Bilateral 6/18/2019    Performed by Jonathan Newby [facility] at [facility]    REMOVAL-TUBE-PEG  5/15/2014    Performed by David Moses MD at [facility] OR 2ND FLR    RHC for lung re-transplant N/A 1/22/2019    Performed by Laura Rachel MD at [facility] CATH LAB    ROBOTIC ASSISTED LAPAROSCOPIC HYSTERECTOMY N/A 4/25/2017    Performed by Deny Dias Jr., MD at [facility] OR    SALPINGECTOMY Bilateral 2015    KJB    SALPINGECTOMY-LAPAROSCOPIC Bilateral  5/19/2015    Performed by [facility]y Dias Jr[facility] at [facility] OR    SINUS SURGERY FUNCTIONAL ENDOSCOPIC WITH NAVIGATION Bilateral 4/3/2017    Performed by Cesar Olsen MD at [facility] OR [address] [place] [address]    SINUS SURGERY FUNCTIONAL ENDOSCOPIC WITH NAVIGATION, 36338, 33789, 28275, 88787 Bilateral 2/5/2015    [facility]    SPHENOIDECTOMY Bilateral 4/9/2019    Performed by Adin Burks III, MD at [facility] OR [address] [place] [address]    THYROPLASTY - Medialization laryngoplasty, cricothyroid subluxation, arytenoid repositioning Left 8/2/2016    Performed by Gary Null MD at [facility] OR [address] [place] [address]    TRANSPLANT-LUNG Bilateral 6/11/2014    Performed by Adolfo Huston MD at [facility] OR [address] [place] [address]       Review of patient's allergies indicates:   Allergen Reactions    Albuterol Palpitations    Colistin Anaphylaxis    Vancomycin analogues      Infusion reaction that does not resolve with slowing  Pt. States she can tolerate it when given with 50 mg Benadryl and ran over 3 hours    Neupogen [filgrastim] Other (See Comments)     Ostealgia after five daily doses of 300 mcg.      Bactrim [sulfamethoxazole-trimethoprim] Hives    Ceftazidime Hives     Pt stated can tolerate cefapine not ceftazidime    Ceftazidime     Dronabinol Other (See Comments)     Mental changes/hallucinations    Haldol [haloperidol lactate] Other (See Comments)     Seizure like activity    Nsaids (non-steroidal anti-inflammatory drug)      Cannot have due to lung transplant    Adhesive Rash     Cloth tape- please use tegaderm or paper tape    Aztreonam Rash    Ciprofloxacin Nausea And Vomiting     Projectile N/V, per patient.  Unwilling to retry therapy.       PTA Medications   Medication Sig    blood sugar diagnostic Strp Preferred by insurance. Testing BG 4 times daily.    blood-glucose meter Misc Use to check blood glucose 4 (four) times daily.    calcium-vitamin D3 (OS-KATY 500 + D3) 500 mg(1,250mg) -200 unit per tablet Take 1  tablet by mouth 2 (two) times daily with meals.    dapsone 100 MG Tab Take 1 tablet (100 mg total) by mouth once daily.    diphenhydrAMINE (BENADRYL) 50 MG tablet Take 1 tablet (50 mg total) by mouth every 6 (six) hours as needed for Itching.    fexofenadine (ALLEGRA) 180 MG tablet Take 180 mg by mouth once daily.    fluticasone propionate (FLONASE) 50 mcg/actuation nasal spray INSERT 2 SPRAYS IN EACH NOSTRIL DAILY    folic acid (FOLVITE) 1 MG tablet Take 1 tablet (1,000 mcg total) by mouth once daily.    gabapentin (NEURONTIN) 300 MG capsule Take 1 capsule (300 mg total) by mouth 3 (three) times daily.    hydrocortisone 1 % cream Apply topically 2 (two) times daily.    inhalation spacing device ([employer]) USE AS DIRECTED    insulin aspart U-100 (NOVOLOG) 100 unit/mL (3 mL) InPn pen Inject 1-10 Units into the skin before meals and at bedtime as needed (Hyperglycemia).    lancets 33 gauge Misc Use to check blood glucose four times daily    levalbuterol (XOPENEX) 1.25 mg/3 mL nebulizer solution Take 3 mLs (1.25 mg total) by nebulization every 8 (eight) hours as needed for Wheezing or Shortness of Breath. Rescue    lipase-protease-amylase (CREON) 36,000-114,000- 180,000 unit CpDR Take 4 capsules by mouth 3 (three) times daily with meals. Take 3 with snacks as needed. (Patient taking differently: Take 5 capsules by mouth 3 (three) times daily with meals. )    LORazepam (ATIVAN) 1 MG tablet Take 1 tablet (1 mg total) by mouth every 8 (eight) hours as needed for Anxiety.    magnesium oxide (MAG-OX) 400 mg (241.3 mg magnesium) tablet Take 1 tablet (400 mg total) by mouth 3 (three) times daily.    metoprolol tartrate (LOPRESSOR) 25 MG tablet Take 1 tablet (25 mg total) by mouth 2 (two) times daily.    montelukast (SINGULAIR) 10 mg tablet Take 1 tablet (10 mg total) by mouth once daily.    morphine (MS CONTIN) 15 MG 12 hr tablet Take 1 tablet by mouth twice daily    multivit,min52-folic-vitK-cQ10  "(AQUADEKS) 100-700-10 mcg-mcg-mg Cap cap 1 tab twice daily    omeprazole (PRILOSEC) 40 MG capsule Take 1 capsule (40 mg total) by mouth once daily.    ondansetron (ZOFRAN) 8 MG tablet Take 1 tablet (8 mg total) by mouth every 12 (twelve) hours as needed for Nausea.    oxyCODONE (ROXICODONE) 5 MG immediate release tablet take 1 tablet by mouth four times a day (Patient taking differently: 10 mg every 6 (six) hours as needed. )    pen needle, diabetic 32 gauge x 5/32" Ndle Injecting 3 times a day    polyethylene glycol (GLYCOLAX) 17 gram PwPk Take 17 g by mouth 2 (two) times daily.    predniSONE (DELTASONE) 5 MG tablet Take 3 tablets (15 mg total) by mouth once daily.    QUEtiapine (SEROQUEL) 25 MG Tab Take 1 tablet by mouth in morning and 2 tablets at bedtime    SITagliptin (JANUVIA) 100 MG Tab Take 1 tablet (100 mg total) by mouth once daily.    tacrolimus (PROGRAF) 0.5 MG Cap Take 1 capsule (0.5 mg total) by mouth every morning. Total daily dose is 1.5 mg every morning and 1 mg every evening.    tacrolimus (PROGRAF) 1 MG Cap Take 1 capsule (1 mg total) by mouth every 12 (twelve) hours.    tiZANidine (ZANAFLEX) 4 MG tablet Take 1 tablet (4 mg total) by mouth 2 (two) times daily.    topiramate (TOPAMAX) 25 MG tablet Take 1 tablet (25 mg total) by mouth 2 (two) times daily    valGANciclovir (VALCYTE) 450 mg Tab Take 1 tablet (450 mg total) by mouth 2 (two) times daily.    venlafaxine (EFFEXOR-XR) 150 MG Cp24 Take 1 capsule (150 mg total) by mouth once daily.    venlafaxine (EFFEXOR-XR) 37.5 MG 24 hr capsule Take 1 capsule (37.5 mg total) by mouth once daily.    voriconazole (VFEND) 200 MG Tab Take 1 tablet (200 mg total) by mouth 2 (two) times daily.     Family History     Problem Relation (Age of Onset)    Cancer Maternal Grandmother    Diabetes Maternal Grandfather    Drug abuse Maternal Grandfather    Hypertension Maternal Grandmother    Thrombocytopenia Maternal Grandfather        Tobacco Use    " Smoking status: Never Smoker    Smokeless tobacco: Never Used   Substance and Sexual Activity    Alcohol use: No     Comment: Has not had an alcoholic drink in more than 2 months.    Drug use: No    Sexual activity: Yes     Partners: Male     Birth control/protection: See Surgical Hx     Comment: current partner since Oct 2016     Review of Systems   Unable to perform ROS: Severe respiratory distress     Objective:     Vital Signs (Most Recent):  Temp: 98.9 °F (37.2 °C) (08/26/19 1045)  Pulse: (!) 116 (08/26/19 1215)  Resp: (!) 26 (08/26/19 1215)  BP: 100/60 (08/26/19 1215)  SpO2: (!) 87 % (08/26/19 1215) Vital Signs (24h Range):  Temp:  [98.9 °F (37.2 °C)-99.3 °F (37.4 °C)] 98.9 °F (37.2 °C)  Pulse:  [113-147] 116  Resp:  [10-35] 26  SpO2:  [76 %-95 %] 87 %  BP: ()/(44-86) 100/60     Weight: 52.5 kg (115 lb 11.9 oz)  Body mass index is 22.6 kg/m².      Intake/Output Summary (Last 24 hours) at 8/26/2019 1240  Last data filed at 8/26/2019 1207  Gross per 24 hour   Intake 683.33 ml   Output 30 ml   Net 653.33 ml       Physical Exam   Constitutional: She appears well-developed and well-nourished. She appears lethargic. She has a sickly appearance. She appears distressed. Face mask in place.   HENT:   Head: Normocephalic.   Nose: Nose normal.   Eyes: Conjunctivae are normal. No scleral icterus.   Neck: Normal range of motion. Neck supple. No JVD present. No tracheal deviation present.   Cardiovascular: Regular rhythm and normal heart sounds. Tachycardia present. Exam reveals no gallop and no friction rub.   No murmur heard.  Pulmonary/Chest: She is in respiratory distress. She has decreased breath sounds in the right lower field. She has no wheezes. She has rhonchi in the right upper field, the right middle field, the left upper field and the left middle field. She has no rales.   Abdominal: Soft. Bowel sounds are normal. She exhibits no distension. There is no tenderness.   Musculoskeletal: Normal range of  motion. She exhibits no edema, tenderness or deformity.   Neurological: She appears lethargic.   Skin: Skin is warm and dry. No erythema. There is pallor.   Nursing note and vitals reviewed.      Significant Labs:  CBC:  Recent Labs   Lab 08/26/19  0847   WBC 12.51   RBC 3.75*   HGB 11.0*   HCT 38.2      *   MCH 29.3   MCHC 28.8*     BMP:  Recent Labs   Lab 08/26/19  0847   *   K 4.4      CO2 19*   BUN 31*   CREATININE 2.4*   CALCIUM 9.3        I have reviewed all pertinent labs within the past 24 hours.    Diagnostic Results:  Labs: Reviewed  X-Ray: Reviewed    Assessment/Plan:     * Septic shock  2/2 LRTI. History of recurrent  pseudomonal infections and MRSA. Continue empiric Zerbaxa and Vancomycin. Maintain MAPs >65. Procalcitonin 79. Will follow up on cultures and adjust therapy as needed.     S/P lung transplant  Underwent bilateral re-transplant for CARLOS EDUARDO on 6/18/19. Continue immunosuppression and prophylaxis.     Immunosuppression  Continue prednisone. Will hold tacrolimus in setting of LISBETH. Daily tacrolimus levels.     Prophylactic antibiotic  Continue valcyte, dapsone, and voriconazole.     Chronic pain with opiate use  Will hold chronic pain meds while intubated and sedated.     CF related Pancreatic insufficiency  Hold creon while NPO. Will start Viokace if started on tube feeds.     LISBETH (acute kidney injury)  Creatinine 2.4 today. Will hold nephrotoxic meds and monitor on daily labs.     Acute respiratory failure  Continue PC ventilation at current settings. Plan for bronchoscopy today. Daily ABG and CXR.         Shama Canales PA-C  Lung Transplant  [facility]

## 2019-08-26 NOTE — ED NOTES
MD Guallpa at bedside, patient remains hypotensive at this time. No change to patient mentation at this time. Patient is following commands.

## 2019-08-26 NOTE — ASSESSMENT & PLAN NOTE
2/2 LRTI. History of recurrent  pseudomonal infections and MRSA. Continue empiric Zerbaxa and Vancomycin. Maintain MAPs >65. Procalcitonin 79. Will follow up on cultures and adjust therapy as needed.

## 2019-08-26 NOTE — PROGRESS NOTES
Wound care consulted to changed wound vac dressing to chest.   While awaiting supplies, procedure began at bedside. Not able to change wound vac dressing at this time.

## 2019-08-27 PROBLEM — Z29.9 PREVENTIVE MEASURE: Status: ACTIVE | Noted: 2019-01-01

## 2019-08-27 PROBLEM — R23.9 ALTERATION IN SKIN INTEGRITY RELATED TO SURGICAL INCISION: Status: ACTIVE | Noted: 2019-01-01

## 2019-08-27 NOTE — ASSESSMENT & PLAN NOTE
Continue PS ventilation at current settings. Will follow up on BAL from 8/26 bronchoscopy. Daily ABG and CXR.

## 2019-08-27 NOTE — NURSING
Dr Mccormack notified of lab results.  Informed of starting of crrt.  Refer to [facility] for assessments and updates.

## 2019-08-27 NOTE — ASSESSMENT & PLAN NOTE
2/2 LRTI. History of recurrent  pseudomonal infections and MRSA. Continue empiric Zerbaxa and Vancomycin. Maintain MAPs >65. Will follow up on cultures and adjust therapy as needed.

## 2019-08-27 NOTE — ASSESSMENT & PLAN NOTE
Underwent bilateral re-transplant for CARLOS EDUARDO on 6/18/19. Continue immunosuppression and prophylaxis.

## 2019-08-27 NOTE — ASSESSMENT & PLAN NOTE
Continue PC ventilation at current settings. Will follow up on BAL from 8/26 bronchoscopy. Daily ABG and CXR.

## 2019-08-27 NOTE — ASSESSMENT & PLAN NOTE
BG goal 140 - 180     Continue IV insulin infusion protocol  Requires intensive BG monitoring while on protocol (Incease to every 2 hours). Will switch to transition infusion if IV insulin infusion rate remains stable.     Discharge planning: SAURABH

## 2019-08-27 NOTE — PROGRESS NOTES
[facility]  Endocrinology  Progress Note    Admit Date: 8/26/2019     Reason for Consult: Management of Steroid induced Hyperglycemia     Surgical Procedure and Date:   Bilateral re-transplant of Lung    Diabetes diagnosis year: No dx of dm. Steroid induced hyperglycemia secondary to lung transplant treatment.     Home Diabetes Medications:    Januvia 100 mg daily   Novolog 4 units TIDWM    How often checking glucose at home? Not checking  BG readings on regimen: Unknown  Hypoglycemia on the regimen?  No  Missed doses on regimen?  Yes    Diabetes Complications include:     Hyperglycemia    Complicating diabetes co morbidities:   Glucocorticoid use       HPI:   Patient is a 29 y.o. female with a diagnosis of   Ms. Stacey is a 30 yo female s/p bilateral re-transplant (6/18/19) who presented to the ED in acute respiratory distress 08/26/2019. Per patient's caregiver, patient was doing well since her hospital discharge, but reports fevers (tmax 101.5) x 2 days, cough with occasional thin sputum, and severe dyspnea requiring >5L oxygen over the weekend. Patient's caregiver also reports poor appetite and PO intake over the last week. Patient has previous history of steroid sensitivity during admission in regards to glycemic control. Endocrinology consulted to manage hyperglycemia during admssion.     Lab Results   Component Value Date    HGBA1C 4.7 01/09/2019       Interval HPI:   Overnight events:  BG is now within goal on current intensive IV insulin infusion. With stable rate of 1.8 units/hr. Patient remains intubated and sedated.     Eating:   NPO  Nausea: No  Hypoglycemia and intervention: No  Fever: No  TPN and/or TF: No  If yes, type of TF/TPN and rate: None    /73 (BP Location: Right arm, Patient Position: Lying)   Pulse (!) 114   Temp 98 °F (36.7 °C) (Oral)   Resp (!) 25   Ht 5' (1.524 m)   Wt 52.2 kg (115 lb)   LMP 10/02/2016 (LMP Unknown)   SpO2 98%   Breastfeeding? No    BMI 22.46 kg/m²      Labs Reviewed and Include    Recent Labs   Lab 08/27/19  1422   *  175*   CALCIUM 8.5*  8.5*   ALBUMIN 2.2*  2.2*   PROT 7.1     136   K 4.8  4.8   CO2 25  25     100   BUN 8  8   CREATININE 0.8  0.8   ALKPHOS 161*   ALT 9*   AST 22   BILITOT 0.5     Lab Results   Component Value Date    WBC 5.46 08/27/2019    HGB 7.8 (L) 08/27/2019    HCT 24.7 (L) 08/27/2019    MCV 97 08/27/2019     (L) 08/27/2019     Recent Labs   Lab 08/26/19  0847   TSH 1.120     Lab Results   Component Value Date    HGBA1C 4.7 01/09/2019       Nutritional status:   Body mass index is 22.46 kg/m².  Lab Results   Component Value Date    ALBUMIN 2.2 (L) 08/27/2019    ALBUMIN 2.2 (L) 08/27/2019    ALBUMIN 2.2 (L) 08/27/2019    ALBUMIN 2.2 (L) 08/27/2019     Lab Results   Component Value Date    PREALBUMIN 15 (L) 05/29/2014    PREALBUMIN 11 (L) 05/09/2014    PREALBUMIN 18 (L) 03/19/2014       Estimated Creatinine Clearance: 73.9 mL/min (based on SCr of 0.8 mg/dL).    Accu-Checks  Recent Labs     08/27/19  0610 08/27/19  0752 08/27/19  0811 08/27/19  0855 08/27/19  1014 08/27/19  1104 08/27/19  1224 08/27/19  1301 08/27/19  1420 08/27/19  1510   POCTGLUCOSE 123* 146* 145* 169* 206* 205* 175* 171* 177* 169*       Current Medications and/or Treatments Impacting Glycemic Control  Immunotherapy:    Immunosuppressants     None        Steroids:   Hormones (From admission, onward)    Start     Stop Route Frequency Ordered    08/27/19 0900  predniSONE tablet 15 mg      -- OG Daily 08/26/19 1106    08/26/19 1031  vasopressin (PITRESSIN) 0.2 Units/mL in dextrose 5 % 100 mL infusion      -- IV Continuous 08/26/19 1031        Pressors:    Autonomic Drugs (From admission, onward)    Start     Stop Route Frequency Ordered    08/26/19 1915  norepinephrine 4 mg in dextrose 5% 250 mL infusion (premix) (titrating)     Question Answer Comment   Titrate by: (in mcg/kg/min) 0.05    Titrate interval: (in minutes) 5     Titrate to maintain: (MAP or SBP) MAP    Greater than: (in mmHg) 65    Maximum dose: (in mcg/kg/min) 3        -- IV Continuous 08/26/19 1042        Hyperglycemia/Diabetes Medications:   Antihyperglycemics (From admission, onward)    Start     Stop Route Frequency Ordered    08/26/19 1549  insulin regular (Humulin R) 100 Units in sodium chloride 0.9% 100 mL infusion     Question:  Insulin Rate Adjustment (DO NOT MODIFY ANSWER)  Answer:  [email or web address]    -- IV Continuous 08/26/19 1549    08/26/19 1515  insulin aspart U-100 (NovoLOG) 100 unit/mL (3 mL) pen     Note to Pharmacy:  Created by cabinet override    08/27 0329   08/26/19 1515          ASSESSMENT and PLAN    * Septic shock  Infection may elevate BG readings  Managed per primary team  Avoid hypoglycemia        Acute hyperglycemia  BG goal 140 - 180     Continue IV insulin infusion protocol  Requires intensive BG monitoring while on protocol (Incease to every 2 hours). Will switch to transition infusion if IV insulin infusion rate remains stable.     Discharge planning: TBD        LISBETH (acute kidney injury)  Caution with insulin stacking            Montrell Guardado NP  Endocrinology  [facility]

## 2019-08-27 NOTE — PROCEDURES
"Juanita Ibarra is a 29 y.o. female patient.    Temp: 97.9 °F (36.6 °C) (08/26/19 1500)  Pulse: 110 (08/26/19 1934)  Resp: (!) 9 (08/26/19 1934)  BP: 101/65 (08/26/19 1934)  SpO2: 99 % (08/26/19 1934)  Weight: 52.5 kg (115 lb 11.9 oz) (08/26/19 1034)  Height: 5' (152.4 cm) (08/26/19 1149)       Arterial Line  Date/Time: 8/26/2019 8:41 PM  Performed by: Jake Alvarez MD  Authorized by: Jake Alvarez MD     Arterial Line  Date/Time: 8/26/2019 8:42 PM  Location procedure was performed: [facility] SURGICAL ICU (SICU)  Performed by: Jake Alvarez MD  Authorized by: Jake Alvarez MD   Assisting provider: Shama Canales PA-C  Pre-op Diagnosis: Septic Shock  Post-operative diagnosis: Septic Shock  Consent Done: Yes  Consent: Written consent obtained.  Risks and benefits: risks, benefits and alternatives were discussed  Consent given by: power of   Patient identity confirmed: MRN and name  Time out: Immediately prior to procedure a "time out" was called to verify the correct patient, procedure, equipment, support staff and site/side marked as required.  Preparation: Patient was prepped and draped in the usual sterile fashion.  Indications: multiple ABGs and hemodynamic monitoring  Location: right femoral  Anesthesia: local infiltration    Anesthesia:  Local Anesthetic: lidocaine 1% without epinephrine  Anesthetic total: 5 mL  Patient sedated: yes  Sedatives: propofol  Analgesia: fentanyl  Seldinger technique: Seldinger technique used  Number of attempts: 5 or more  Complications: No  Estimated blood loss (mL): 5  Post-procedure: line sutured  Patient tolerance: Patient tolerated the procedure well with no immediate complications          Jake Alvarez  8/26/2019  "

## 2019-08-27 NOTE — PROCEDURES
Juanita Ibarra is a 29 y.o. female patient.    Temp: 97.9 °F (36.6 °C) (08/26/19 1500)  Pulse: 110 (08/26/19 1934)  Resp: (!) 9 (08/26/19 1934)  BP: 101/65 (08/26/19 1934)  SpO2: 99 % (08/26/19 1934)  Weight: 52.5 kg (115 lb 11.9 oz) (08/26/19 1034)  Height: 5' (152.4 cm) (08/26/19 1149)       Intubation  Date/Time: 8/26/2019 10:00 AM  Location procedure was performed: [facility] SURGICAL ICU (SICU)  Performed by: Jake Alvarez MD  Authorized by: Jake Alvarez MD   Assisting provider: Jake Alvarez MD  Consent Done: Not Needed  Indications: respiratory failure  Intubation method: video-assisted  Patient status: unconscious  Preoxygenation: BVM  Sedatives: etomidate  Paralytic: rocuronium  Laryngoscope size: Mac 4  Tube size: 8.0 mm  Tube type: cuffed  Number of attempts: 2  Ventilation between attempts: BVM  Cricoid pressure: no  Cords visualized: yes  Post-procedure assessment: chest rise and ETCO2 monitor  Breath sounds: clear and equal and absent over the epigastrium  Cuff inflated: yes  ETT to lip: 21 cm  Tube secured with: ETT figueroa  Chest x-ray interpreted by me.  Chest x-ray findings: endotracheal tube in appropriate position  Patient tolerance: Patient tolerated the procedure well with no immediate complications  Complications: No  Specimens: No  Implants: No          Jake Alvarez  8/26/2019

## 2019-08-27 NOTE — NURSING
Dr Mccormack at bedside for placement of dialysis cath.  Consent signed and witnessed.  Sterile technique maintained.  Tolerated well.  Dialysis RN notified of cath placement after insertion.

## 2019-08-27 NOTE — PROGRESS NOTES
[facility]  Nephrology  Progress Note    Patient Name: Juanita Ibarra  MRN: [id number]  Admission Date: 8/26/2019  Hospital Length of Stay: 1 days  Attending Provider: Jake Alvarez MD   Primary Care Physician: Primary Doctor No  Principal Problem:Septic shock    Subjective:     Interval History:   SLED started overnight for metabolic clearance. Patient tolerating a UFR of 350 ml/hr upon assessment this AM. CVP 21, 19 this AM. Patient intubated on sedation, FiO2 40%. Patient on Levophed and Vaso for BP support.     Review of patient's allergies indicates:   Allergen Reactions    Albuterol Palpitations    Colistin Anaphylaxis    Vancomycin analogues      Infusion reaction that does not resolve with slowing  Pt. States she can tolerate it when given with 50 mg Benadryl and ran over 3 hours    Neupogen [filgrastim] Other (See Comments)     Ostealgia after five daily doses of 300 mcg.      Bactrim [sulfamethoxazole-trimethoprim] Hives    Ceftazidime Hives     Pt stated can tolerate cefapine not ceftazidime    Ceftazidime     Dronabinol Other (See Comments)     Mental changes/hallucinations    Haldol [haloperidol lactate] Other (See Comments)     Seizure like activity    Nsaids (non-steroidal anti-inflammatory drug)      Cannot have due to lung transplant    Adhesive Rash     Cloth tape- please use tegaderm or paper tape    Aztreonam Rash    Ciprofloxacin Nausea And Vomiting     Projectile N/V, per patient.  Unwilling to retry therapy.     Current Facility-Administered Medications   Medication Frequency    0.9%  NaCl infusion (CRRT USE ONLY) Continuous    ceftolozane-tazobactam (ZERBAXA) 1,500 mg in dextrose 5 % 100 mL Q8H    chlorhexidine 0.12 % solution 15 mL BID    dexmedetomidine (PRECEDEX) 400mcg/100mL 0.9% NaCL infusion Continuous    dextrose 10% (D10W) Bolus PRN    dextrose 10% (D10W) Bolus PRN    diphenhydrAMINE 12.5 mg/5 mL elixir 25 mg Daily PRN    enoxaparin  injection 40 mg Daily    fentaNYL 2500 mcg in 0.9% sodium chloride 250 mL infusion premix (titrating) Continuous    insulin regular (Humulin R) 100 Units in sodium chloride 0.9% 100 mL infusion Continuous    levalbuterol nebulizer solution 1.25 mg Q8H    LORazepam tablet 1 mg Q8H PRN    magnesium sulfate 2g in water 50mL IVPB (premix) PRN    micafungin 100 mg in sodium chloride 0.9 % 100 mL IVPB (ready to mix system) Q24H    norepinephrine 4 mg in dextrose 5% 250 mL infusion (premix) (titrating) Continuous    ondansetron injection 8 mg Q6H PRN    predniSONE tablet 15 mg Daily    propofol (DIPRIVAN) 10 mg/mL infusion Continuous    QUEtiapine tablet 25 mg Daily    QUEtiapine tablet 50 mg QHS    sodium chloride 0.9% bolus 1,000 mL Once    sodium phosphate 20.01 mmol in dextrose 5 % 250 mL IVPB PRN    sodium phosphate 30 mmol in dextrose 5 % 250 mL IVPB PRN    sodium phosphate 39.99 mmol in dextrose 5 % 250 mL IVPB PRN    topiramate tablet 25 mg BID    vasopressin (PITRESSIN) 0.2 Units/mL in dextrose 5 % 100 mL infusion Continuous    voriconazole tablet 200 mg BID       Objective:     Vital Signs (Most Recent):  Temp: 98.3 °F (36.8 °C) (08/27/19 1100)  Pulse: 103 (08/27/19 1345)  Resp: (!) 24 (08/27/19 0706)  BP: 105/72 (08/27/19 1200)  SpO2: 97 % (08/27/19 1345)  O2 Device (Oxygen Therapy): ventilator (08/27/19 1110) Vital Signs (24h Range):  Temp:  [97.7 °F (36.5 °C)-98.9 °F (37.2 °C)] 98.3 °F (36.8 °C)  Pulse:  [101-134] 103  Resp:  [6-28] 24  SpO2:  [90 %-100 %] 97 %  BP: ()/(54-75) 105/72  Arterial Line BP: ()/(47-82) 105/61     Weight: 52.2 kg (115 lb) (08/27/19 0930)  Body mass index is 22.46 kg/m².  Body surface area is 1.49 meters squared.    I/O last 3 completed shifts:  In: 2989.3 [I.V.:2889.3; IV Piggyback:100]  Out: 2451 [Urine:76; Other:2375]    Physical Exam   Constitutional: She appears well-developed.   Sedated on mechanical ventilation   HENT:   Head: Normocephalic and  atraumatic.   Right Ear: External ear normal.   Left Ear: External ear normal.   Eyes: Right eye exhibits no discharge. Left eye exhibits no discharge. No scleral icterus.   Cardiovascular: Normal rate and regular rhythm.   Pulmonary/Chest: Effort normal. No respiratory distress. She has no wheezes. She has rales.   Transmitted ventilator sounds   Musculoskeletal: She exhibits edema. She exhibits no tenderness or deformity.   Skin: There is pallor.   Nursing note and vitals reviewed.      Significant Labs:  CBC:   Recent Labs   Lab 08/27/19  1014   WBC 6.14   RBC 2.57*   HGB 7.9*   HCT 24.3*   *   MCV 95   MCH 30.7   MCHC 32.5     CMP:   Recent Labs   Lab 08/27/19  1014   *  204*   CALCIUM 8.3*  8.3*   ALBUMIN 2.2*  2.2*   PROT 6.8  6.8     137   K 4.5  4.5   CO2 22*  22*     100   BUN 12  12   CREATININE 1.1  1.1   ALKPHOS 150*  150*   ALT 10  10   AST 11  11   BILITOT 0.4  0.4            Assessment/Plan:     * Septic shock  - Being managed by primary team     LISBETH (acute kidney injury)  Ms. 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 Her baseline SCr in the past year has been around ~ 1.0. LISBETH likely secondary to ATN caused by hypotension/sepsis in the setting of severe metabolic acidosis.    Recommendations:    - Acidosis slightly improved, CO2 26. ABG concerning for primary respiratory acidosis with an secondary metabolic acidosis.   - Patient hypervolemic on assessment, CVP 21, 19 today thus far. CXR concerning for volume overload.   - Will continue SLED for metabolic clearance and volume management, UFR adjusted to 400 ml/hr as tolerated, titrate pressors as needed as primary team is requesting aggressive volume removal.   - Poor urine output at this time   - Strict I/O and chart  - Avoid nephrotoxic medications, NSAIDs, IV contrast, etc.  - Medication doses adjusted to  GFR  - Maintain MAP > 65  - Will follow closely          Thank you for your consult. I will follow-up with patient. Please contact us if you have any additional questions.    Rema Lunsford DNP, FNP-C  Nephrology  [facility]

## 2019-08-27 NOTE — CONSULTS
Wound care consulted for wound vac dressing changes  PHM.  Lung transplant in 5/19, delay in healing, immunosuppression, chronic pain with opiate use, CF related pancreatic insufficiency, LISBETH, DM related to cystic fibrosis, septic shock  Assessment:  The right upper horizontal incision site is delayed in healing, pale pink moist wound bed.  The wound vac dressing was changed, seal intact at -125 mmHg, bridged to right lateral chest. Home wound vac disconnected and given to father to take home.    Wound care will continue wound vac dressing changes every Tues/Fri.   Nursing to monitor for intact seal.   Immerse mattress with evolution bed ordered  Nutritional supplement recommended to improve healing.   Discussed with KEARA Canales PA-C, lung transplant.   Nursing to continue care, wound care will follow-up miguel a Nunn RN, [facility]  [id number]     08/27/19 1030        Incision/Site 06/18/19 1300 Right Chest transverse   Date First Assessed/Time First Assessed: 06/18/19 1300   Side: Right  Location: Chest  Orientation: transverse   Wound Image    Incision WDL ex   Dressing Appearance Intact;Moist drainage   Drainage Amount Scant   Drainage Characteristics/Odor Serous   Appearance Pink;Moist;Tan;Slough   Red (%), Wound Tissue Color 80 %   Yellow (%), Wound Tissue Color 20 %   Periwound Area Intact;Dry;Pink   Wound Edges Open   Wound Length (cm) 1 cm   Wound Width (cm) 12 cm   Wound Depth (cm) 1.2 cm   Wound Volume (cm^3) 14.4 cm^3   Wound Surface Area (cm^2) 12 cm^2   Care Cleansed with:;Sterile normal saline;Applied:;Skin Barrier   Dressing Foam;Transparent film   Dressing Change Due 08/30/19        Negative Pressure Wound Therapy    Placement Date/Time: 08/26/19 1231   Side: Right  Location: Upper quadrant;Chest   NPWT Type Vacuum Therapy   Therapy Setting NPWT Continuous therapy   Pressure Setting NPWT 125 mmHg   Sponges Inserted NPWT Black;1   Sponges Removed NPWT Black;1   Skin Interventions   Pressure Reduction  Devices pressure-redistributing mattress utilized;heel offloading device utilized   Pressure Reduction Techniques weight shift assistance provided;heels elevated off bed;pressure points protected   Skin Protection tubing/devices free from skin contact;transparent dressing maintained;silicone foam dressing in place;protective footwear used;incontinence pads utilized;adhesive use limited

## 2019-08-27 NOTE — ASSESSMENT & PLAN NOTE
Ms. 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 Her baseline SCr in the past year has been around ~ 1.0. LISBETH likely secondary to ATN caused by hypotension/sepsis in the setting of severe metabolic acidosis.    Recommendations:    - Acidosis slightly improved, CO2 26. ABG concerning for primary respiratory acidosis with an secondary metabolic acidosis.   - Patient hypervolemic on assessment, CVP 21, 19 today thus far. CXR concerning for volume overload.   - Will continue SLED for metabolic clearance and volume management, UFR adjusted to 400 ml/hr as tolerated, titrate pressors as needed as primary team is requesting aggressive volume removal.   - Strict I/O and chart  - Avoid nephrotoxic medications, NSAIDs, IV contrast, etc.  - Medication doses adjusted to GFR  - Maintain MAP > 65  - Will follow closely

## 2019-08-27 NOTE — ASSESSMENT & PLAN NOTE
Ms. 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 Her baseline SCr in the past year has been around ~ 1.0. LISBETH likely secondary to ATN caused by hypotension/sepsis in the setting of severe metabolic acidosis.    Recommendations:    - SLED for metabolic clearance secondary to persistent acidosis.    - Strict I/O and chart  - Avoid nephrotoxic medications, NSAIDs, IV contrast, etc.  - Medication doses adjusted to GFR  - Maintain MAP > 65  - Will follow closely

## 2019-08-27 NOTE — ASSESSMENT & PLAN NOTE
Started on CRRT for metabolic acidosis and oliguric LISBETH. Will continue to hold nephrotoxic meds. Nephrology following appreciate recs.

## 2019-08-27 NOTE — PROGRESS NOTES
[facility]  Lung Transplant  Progress Note - Critical Care    Patient Name: Juanita Ibarra  MRN: [id number]  Admission Date: 8/26/2019  Hospital Length of Stay: 1 days  Post-Operative Day: 1902 (Lung), 70 (Lung)  Attending Physician: Jake Alvarez MD  Primary Care Provider: Primary Doctor No     Subjective:     Interval History: Started on CRRT overnight. Remains oliguric and on PC ventilation. Oxgenation improved today. Precedex added for agitation this morning. Continues to require low dose vasopressin and levophed.    Continuous Infusions:   sodium chloride 0.9% 200 mL/hr at 08/27/19 1009    dexmedetomidine (PRECEDEX) infusion 0.6 mcg/kg/hr (08/27/19 1000)    fentanyl 75 mcg/hr (08/27/19 1000)    insulin (HUMAN R) infusion (adults) 2.1 Units/hr (08/27/19 1000)    norepinephrine bitartrate-D5W 0.02 mcg/kg/min (08/27/19 1000)    propofol 50 mcg/kg/min (08/27/19 1000)    vasopressin (PITRESSIN) infusion 0.04 Units/min (08/27/19 1000)     Scheduled Meds:   ceftolozane-tazobactam  1,500 mg Intravenous Q8H    chlorhexidine  15 mL Mouth/Throat BID    enoxaparin  40 mg Subcutaneous Daily    levalbuterol  1.25 mg Nebulization Q8H    micafungin (MYCAMINE) IVPB  100 mg Intravenous Q24H    predniSONE  15 mg Per OG tube Daily    QUEtiapine  25 mg Per OG tube Daily    QUEtiapine  50 mg Per OG tube QHS    sodium chloride 0.9%  1,000 mL Intravenous Once    topiramate  25 mg Per OG tube BID    vancomycin (VANCOCIN) IVPB (custom)  1,250 mg Intravenous Once    voriconazole  200 mg Per OG tube BID     PRN Meds:Dextrose 10% Bolus, Dextrose 10% Bolus, diphenhydrAMINE, LORazepam, magnesium sulfate IVPB, ondansetron, sodium phosphate IVPB, sodium phosphate IVPB, sodium phosphate IVPB    Review of patient's allergies indicates:   Allergen Reactions    Albuterol Palpitations    Colistin Anaphylaxis    Vancomycin analogues      Infusion reaction that does not resolve with slowing  Pt.  States she can tolerate it when given with 50 mg Benadryl and ran over 3 hours    Neupogen [filgrastim] Other (See Comments)     Ostealgia after five daily doses of 300 mcg.      Bactrim [sulfamethoxazole-trimethoprim] Hives    Ceftazidime Hives     Pt stated can tolerate cefapine not ceftazidime    Ceftazidime     Dronabinol Other (See Comments)     Mental changes/hallucinations    Haldol [haloperidol lactate] Other (See Comments)     Seizure like activity    Nsaids (non-steroidal anti-inflammatory drug)      Cannot have due to lung transplant    Adhesive Rash     Cloth tape- please use tegaderm or paper tape    Aztreonam Rash    Ciprofloxacin Nausea And Vomiting     Projectile N/V, per patient.  Unwilling to retry therapy.       Review of Systems   Unable to perform ROS: Intubated     Objective:   Physical Exam   Constitutional: She appears well-developed and well-nourished. She has a sickly appearance. No distress. She is intubated.   HENT:   Head: Normocephalic.   Nose: Nose normal.   Eyes: Conjunctivae are normal. No scleral icterus.   Neck: Normal range of motion. Neck supple. No JVD present. No tracheal deviation present.   Cardiovascular: Regular rhythm and normal heart sounds. Tachycardia present. Exam reveals no gallop and no friction rub.   No murmur heard.  Pulmonary/Chest: She is intubated. No respiratory distress. She has decreased breath sounds in the right lower field. She has no wheezes. She has rhonchi in the right upper field, the right middle field, the left upper field and the left middle field. She has no rales.   Mechanical BS bilaterally   Abdominal: Soft. Bowel sounds are normal. She exhibits no distension. There is no tenderness.   Genitourinary:   Genitourinary Comments: saleh   Musculoskeletal: Normal range of motion. She exhibits no edema, tenderness or deformity.   Neurological:   Follows commands and moves all 4 extremities   Skin: Skin is warm and dry. No erythema. There is  pallor.   Nursing note and vitals reviewed.        Vital Signs (Most Recent):  Temp: 98.9 °F (37.2 °C) (08/27/19 0700)  Pulse: 109 (08/27/19 1015)  Resp: (!) 24 (08/27/19 0706)  BP: 98/61 (08/27/19 1000)  SpO2: 97 % (08/27/19 1015) Vital Signs (24h Range):  Temp:  [97.7 °F (36.5 °C)-98.9 °F (37.2 °C)] 98.9 °F (37.2 °C)  Pulse:  [101-134] 109  Resp:  [6-35] 24  SpO2:  [82 %-100 %] 97 %  BP: ()/(54-78) 98/61  Arterial Line BP: ()/(47-82) 96/54     Weight: 52.2 kg (115 lb)  Body mass index is 22.46 kg/m².      Intake/Output Summary (Last 24 hours) at 8/27/2019 1044  Last data filed at 8/27/2019 1000  Gross per 24 hour   Intake 2366 ml   Output 3693 ml   Net -1327 ml       Ventilator Data:     Vent Mode: A/C  Oxygen Concentration (%):  [] 39  Resp Rate Total:  [17 br/min-49 br/min] 24 br/min  PEEP/CPAP:  [5 lrY02-51 cmH20] 5 cmH20  Mean Airway Pressure:  [11 awX34-32 cmH20] 11 cmH20    Hemodynamic Parameters:       Lines/Drains:       Hemodialysis Catheter 08/26/19 2225 right femoral (Active)   Site Assessment Clean;Dry;Intact 8/27/2019  3:00 AM   Status Accessed 8/27/2019  3:00 AM   Flows Good 8/27/2019  3:00 AM   Dressing Intervention New dressing 8/26/2019 10:30 PM   Dressing Status Biopatch in place;Clean;Dry;Intact 8/27/2019  3:00 AM   Dressing Change Due 08/30/19 8/27/2019  3:00 AM   Verification by X-ray Other (Comment) 8/26/2019 10:30 PM   Dressing Occlusive 8/27/2019  3:00 AM   Daily Line Review Performed 8/27/2019  3:00 AM   Number of days: 0            Port A Cath Single Lumen 08/26/19 right subclavian (Active)   Dressing Type Transparent 8/27/2019  3:00 AM   Dressing Status Clean;Dry;Intact 8/27/2019  3:00 AM   Dressing Intervention Dressing reinforced 8/26/2019  3:00 PM   Dressing Change Due 09/02/19 8/27/2019  3:00 AM   Line Status Infusing 8/27/2019  3:00 AM   Daily Line Review Performed 8/27/2019  3:00 AM   Number of days: 1            Percutaneous Central Line Insertion/Assessment -  triple lumen  08/26/19 1135 left internal jugular (Active)   Dressing biopatch in place;dressing dry and intact 8/27/2019  3:00 AM   Securement secured w/ sutures 8/27/2019  3:00 AM   Additional Site Signs no erythema;no warmth;no edema;no pain;no palpable cord;no streak formation;no drainage 8/27/2019  3:00 AM   Distal Patency/Care infusing 8/27/2019  3:00 AM   Medial Patency/Care infusing 8/27/2019  3:00 AM   Proximal Patency/Care infusing 8/27/2019  3:00 AM   Waveform normal 8/27/2019  3:00 AM   Line Interventions line leveled/zeroed 8/27/2019  3:00 AM   Dressing Change Due 09/02/19 8/27/2019  3:00 AM   Daily Line Review Performed 8/27/2019  3:00 AM   Number of days: 0            Peripheral IV - Single Lumen 08/26/19 22 G Right Hand (Active)   Site Assessment Clean;Dry;Intact 8/27/2019  3:00 AM   Line Status Infusing 8/27/2019  3:00 AM   Dressing Status Clean;Dry;Intact 8/27/2019  3:00 AM   Dressing Intervention Dressing reinforced 8/26/2019  3:00 PM   Dressing Change Due 08/30/19 8/27/2019  3:00 AM   Site Change Due 08/30/19 8/26/2019  7:00 PM   Reason Not Rotated Not due 8/27/2019  3:00 AM   Number of days: 1            Peripheral IV - Single Lumen 08/26/19 1119 22 G Left;Posterior Hand (Active)   Site Assessment Clean;Dry;Intact 8/27/2019  3:00 AM   Line Status Infusing 8/27/2019  3:00 AM   Dressing Status Clean;Dry;Intact 8/27/2019  3:00 AM   Dressing Intervention Dressing reinforced 8/26/2019  3:00 PM   Dressing Change Due 08/30/19 8/27/2019  3:00 AM   Site Change Due 08/30/19 8/26/2019  7:00 PM   Reason Not Rotated Not due 8/27/2019  3:00 AM   Number of days: 0            Peripheral IV - Single Lumen 08/26/19 1119 20 G Anterior;Right Forearm (Active)   Site Assessment Clean;Dry;Intact 8/27/2019  3:00 AM   Line Status Infusing 8/27/2019  3:00 AM   Dressing Status Clean;Dry;Intact 8/27/2019  3:00 AM   Dressing Intervention Dressing reinforced 8/26/2019  3:00 PM   Dressing Change Due 08/30/19 8/27/2019  3:00  "AM   Site Change Due 08/30/19 8/26/2019  7:00 PM   Reason Not Rotated Not due 8/27/2019  3:00 AM   Number of days: 0            Arterial Line 08/26/19 1700 Right Femoral (Active)   Site Assessment Clean;Dry;Intact 8/27/2019  3:00 AM   Line Status Pulsatile blood flow 8/27/2019  3:00 AM   Art Line Waveform Appropriate 8/27/2019  3:00 AM   Arterial Line Interventions Zeroed and calibrated;Leveled;Connections checked and tightened;Flushed per protocol;Line pulled back 8/27/2019  3:00 AM   Color/Movement/Sensation Capillary refill less than 3 sec 8/27/2019  3:00 AM   Dressing Type Transparent 8/27/2019  3:00 AM   Dressing Status Clean;Dry;Intact 8/27/2019  3:00 AM   Dressing Change Due 08/30/19 8/27/2019  3:00 AM   Number of days: 0            NG/OG Tube 08/26/19 1140 orogastric 18 Fr. Center mouth (Active)   Placement Check placement verified by x-ray 8/27/2019  3:00 AM   Tolerance signs/symptoms of discomfort 8/27/2019  3:00 AM   Securement secured to commercial device 8/27/2019  3:00 AM   Clamp Status/Tolerance unclamped 8/27/2019  3:00 AM   Suction Setting/Drainage Method suction at;low;moderate 8/27/2019  3:00 AM   Insertion Site Appearance no redness, warmth, tenderness, skin breakdown, drainage 8/27/2019  3:00 AM   Drainage Green;Brown 8/27/2019  3:00 AM   Intake (mL) 60 mL 8/27/2019  9:00 AM   Number of days: 0            Urethral Catheter 08/26/19 1230 (Active)   Site Assessment Clean;Intact 8/27/2019  3:00 AM   Collection Container Urimeter 8/27/2019  3:00 AM   Securement Method secured to top of thigh w/ adhesive device 8/27/2019  3:00 AM   Catheter Care Performed no 8/27/2019  3:00 AM   Reason for Continuing Urinary Catheterization Critically ill in ICU requiring intensive monitoring 8/27/2019  3:00 AM   CAUTI Prevention Bundle StatLock in place w 1" slack;Intact seal between catheter & drainage tubing;Drainage bag off the floor;No dependent loops or kinks;Green sheeting clip in use;Drainage bag not " overfilled (<2/3 full);Drainage bag below bladder 8/26/2019 11:00 PM   Output (mL) 10 mL 8/27/2019  9:00 AM   Number of days: 0       Significant Labs:  CBC:  Recent Labs   Lab 08/27/19  0615   WBC 7.54   RBC 2.82*   HGB 8.8*   HCT 28.3*   *   *   MCH 31.2*   MCHC 31.1*     BMP:  Recent Labs   Lab 08/27/19  0615      K 4.5      CO2 26   BUN 8   CREATININE 0.7   CALCIUM 8.4*      Tacrolimus Levels:  Recent Labs   Lab 08/27/19  0445   TACROLIMUS 8.4     Microbiology:  Microbiology Results (last 7 days)     Procedure Component Value Units Date/Time    Culture, Respiratory [[id number]]  (Abnormal) Collected:  08/26/19 1441    Order Status:  Completed Specimen:  Respiratory from Bronchial Wash Updated:  08/27/19 0847     Respiratory Culture STAPHYLOCOCCUS AUREUS  Many  Susceptibility pending       Gram Stain (Respiratory) <10 epithelial cells per low power field.     Gram Stain (Respiratory) Few WBC's     Gram Stain (Respiratory) Many Gram positive cocci    Narrative:       Bronchial Wash    Blood culture #1 **CANNOT BE ORDERED STAT** [[id number]] Collected:  08/26/19 0848    Order Status:  Completed Specimen:  Blood from Line, Port A Cath Updated:  08/27/19 0605     Blood Culture, Routine Gram stain lisa bottle: Gram positive cocci in clusters resembling Staph       Results called to and read back by:Monet Martinez RN 08/27/2019  06:04    Blood culture #2 **CANNOT BE ORDERED STAT** [[id number]] Collected:  08/26/19 0859    Order Status:  Completed Specimen:  Blood from Wrist, Right Updated:  08/27/19 0325     Blood Culture, Routine Gram stain aer bottle: Gram positive cocci in clusters resembling Staph       Results called to and read back by:Monet Martinez RN 08/27/2019 03:23    AFB Culture & Smear [[id number]] Collected:  08/26/19 1441    Order Status:  Sent Specimen:  Respiratory from Bronchial Wash Updated:  08/26/19 1648    Fungus culture [[id number]] Collected:  08/26/19 1441    Order Status:   Sent Specimen:  Respiratory from Bronchial Wash Updated:  08/26/19 1647    Respiratory Infection Panel, Nasopharyngeal [[id number]] Collected:  08/26/19 1003    Order Status:  Completed Specimen:  Nasopharyngeal Swab Updated:  08/26/19 1344     Respiratory Infection Panel Source NP Swab     Adenovirus Not Detected     Coronavirus 229E Not Detected     Coronavirus HKU1 Not Detected     Coronavirus NL63 Not Detected     Coronavirus OC43 Not Detected     Human Metapneumovirus Not Detected     Human Rhinovirus/Enterovirus Not Detected     Influenza A (subtypes H1, H1-2009,H3) Not Detected     Influenza B Not Detected     Parainfluenza Virus 1 Not Detected     Parainfluenza Virus 2 Not Detected     Parainfluenza Virus 3 Not Detected     Parainfluenza Virus 4 Not Detected     Respiratory Syncytial Virus Not Detected     Bordetella Parapertussis (HA7772) Not Detected     Bordetella pertussis (ptxP) Not Detected     Chlamydia pneumoniae Not Detected     Mycoplasma pneumoniae Not Detected    Narrative:       For all other respiratory sources order [id number] Respiratory  Viral Panel by PCR (RVPCR)    Culture, Respiratory  - Cystic Fibrosis [[id number]]     Order Status:  Canceled Specimen:  Respiratory           I have reviewed all pertinent labs within the past 24 hours.    Diagnostic Results:  Chest X-Ray: I personally reviewed the films and findings are:, bilateral opacities with improved aeration as compared to previous CXR. Right pleural effusion.     No Further        Assessment/Plan:     * Septic shock  Likely multifactorial - GPC in blood cultures and staph from respiratory culture. History of recurrent  pseudomonal infections and MRSA. Continue empiric Zerbaxa and Vancomycin. Maintain MAPs >65. Will follow up on cultures and adjust therapy as needed.     S/P lung transplant  Underwent bilateral re-transplant for CARLOS EDUARDO on 6/18/19. Continue immunosuppression and prophylaxis.     Immunosuppression  Continue prednisone.  Will hold tacrolimus in setting of LISBETH. Daily tacrolimus levels.     Prophylactic antibiotic  Continue valcyte, dapsone, and voriconazole.     Chronic pain with opiate use  Will hold chronic pain meds while intubated and sedated.     CF related Pancreatic insufficiency  Hold creon while NPO. Will start Viokace if started on tube feeds.     LISBETH (acute kidney injury)  Started on CRRT for metabolic acidosis and oliguric LISBETH. Discussed volume removal given her CVP of 21 today. Will continue to hold nephrotoxic meds. Nephrology following appreciate recs.     Acute respiratory failure  Continue PC ventilation at current settings. Will follow up on BAL from 8/26 bronchoscopy. Daily ABG and CXR.     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      Preventive Measures: Nutrition: Goal: NPO, DVT: continue prophyllaxis, Head of Bet: elevated, Reposition: per unit routine    Counseling/Consultation:I discussed the case with Dr. Alvarez.      Shama Canales PA-C  Lung Transplant  [facility]

## 2019-08-27 NOTE — PROGRESS NOTES
No response to lasix. Patient has had 0 ml of urine output. Creatinine now 2.8. Dr. Alvarez will call nephrology. Plan for CRRT this PM.

## 2019-08-27 NOTE — HPI
A 28 y/o female s/p bilateral re-transplant (6/18/19) secondary to CF, who presented to the ED in acute respiratory distress. Patient's family member reports that she had and episode of fever 2 days ago accompanied by productive cough requiring increasing at home oxygen to ~5L. Also, caregiver reports poor appetite and PO intake over the last week. Upon arrival to ED, oxygen saturations were in mid 70s while on NRB and patient was placed on BiPAP which was transitioned to 25L comfort flow at 100% FiO2. On arrival to ED and worsening clinical status patient was transferred to the ICU, intubated, and started on Zerbaxa/Vancomycin. 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    Nephrology consulted for evaluation of LISBETH and Metabolic acidosis.

## 2019-08-27 NOTE — SUBJECTIVE & OBJECTIVE
Subjective:     Interval History: Started on CRRT overnight. Remains oliguric and on PC ventilation. Oxgenation improved today. Precedex added for agitation this morning. Continues to require low dose vasopressin and levophed.    Continuous Infusions:   sodium chloride 0.9% 200 mL/hr at 08/27/19 1009    dexmedetomidine (PRECEDEX) infusion 0.6 mcg/kg/hr (08/27/19 1000)    fentanyl 75 mcg/hr (08/27/19 1000)    insulin (HUMAN R) infusion (adults) 2.1 Units/hr (08/27/19 1000)    norepinephrine bitartrate-D5W 0.02 mcg/kg/min (08/27/19 1000)    propofol 50 mcg/kg/min (08/27/19 1000)    vasopressin (PITRESSIN) infusion 0.04 Units/min (08/27/19 1000)     Scheduled Meds:   ceftolozane-tazobactam  1,500 mg Intravenous Q8H    chlorhexidine  15 mL Mouth/Throat BID    enoxaparin  40 mg Subcutaneous Daily    levalbuterol  1.25 mg Nebulization Q8H    micafungin (MYCAMINE) IVPB  100 mg Intravenous Q24H    predniSONE  15 mg Per OG tube Daily    QUEtiapine  25 mg Per OG tube Daily    QUEtiapine  50 mg Per OG tube QHS    sodium chloride 0.9%  1,000 mL Intravenous Once    topiramate  25 mg Per OG tube BID    vancomycin (VANCOCIN) IVPB (custom)  1,250 mg Intravenous Once    voriconazole  200 mg Per OG tube BID     PRN Meds:Dextrose 10% Bolus, Dextrose 10% Bolus, diphenhydrAMINE, LORazepam, magnesium sulfate IVPB, ondansetron, sodium phosphate IVPB, sodium phosphate IVPB, sodium phosphate IVPB    Review of patient's allergies indicates:   Allergen Reactions    Albuterol Palpitations    Colistin Anaphylaxis    Vancomycin analogues      Infusion reaction that does not resolve with slowing  Pt. States she can tolerate it when given with 50 mg Benadryl and ran over 3 hours    Neupogen [filgrastim] Other (See Comments)     Ostealgia after five daily doses of 300 mcg.      Bactrim [sulfamethoxazole-trimethoprim] Hives    Ceftazidime Hives     Pt stated can tolerate cefapine not ceftazidime    Ceftazidime      Dronabinol Other (See Comments)     Mental changes/hallucinations    Haldol [haloperidol lactate] Other (See Comments)     Seizure like activity    Nsaids (non-steroidal anti-inflammatory drug)      Cannot have due to lung transplant    Adhesive Rash     Cloth tape- please use tegaderm or paper tape    Aztreonam Rash    Ciprofloxacin Nausea And Vomiting     Projectile N/V, per patient.  Unwilling to retry therapy.       Review of Systems   Unable to perform ROS: Intubated     Objective:   Physical Exam   Constitutional: She appears well-developed and well-nourished. She has a sickly appearance. No distress. She is intubated.   HENT:   Head: Normocephalic.   Nose: Nose normal.   Eyes: Conjunctivae are normal. No scleral icterus.   Neck: Normal range of motion. Neck supple. No JVD present. No tracheal deviation present.   Cardiovascular: Regular rhythm and normal heart sounds. Tachycardia present. Exam reveals no gallop and no friction rub.   No murmur heard.  Pulmonary/Chest: She is intubated. No respiratory distress. She has decreased breath sounds in the right lower field. She has no wheezes. She has rhonchi in the right upper field, the right middle field, the left upper field and the left middle field. She has no rales.   Mechanical BS bilaterally   Abdominal: Soft. Bowel sounds are normal. She exhibits no distension. There is no tenderness.   Genitourinary:   Genitourinary Comments: saleh   Musculoskeletal: Normal range of motion. She exhibits no edema, tenderness or deformity.   Neurological:   Follows commands and moves all 4 extremities   Skin: Skin is warm and dry. No erythema. There is pallor.   Nursing note and vitals reviewed.        Vital Signs (Most Recent):  Temp: 98.9 °F (37.2 °C) (08/27/19 0700)  Pulse: 109 (08/27/19 1015)  Resp: (!) 24 (08/27/19 0706)  BP: 98/61 (08/27/19 1000)  SpO2: 97 % (08/27/19 1015) Vital Signs (24h Range):  Temp:  [97.7 °F (36.5 °C)-98.9 °F (37.2 °C)] 98.9 °F (37.2  °C)  Pulse:  [101-134] 109  Resp:  [6-35] 24  SpO2:  [82 %-100 %] 97 %  BP: ()/(54-78) 98/61  Arterial Line BP: ()/(47-82) 96/54     Weight: 52.2 kg (115 lb)  Body mass index is 22.46 kg/m².      Intake/Output Summary (Last 24 hours) at 8/27/2019 1044  Last data filed at 8/27/2019 1000  Gross per 24 hour   Intake 2366 ml   Output 3693 ml   Net -1327 ml       Ventilator Data:     Vent Mode: A/C  Oxygen Concentration (%):  [] 39  Resp Rate Total:  [17 br/min-49 br/min] 24 br/min  PEEP/CPAP:  [5 svK69-26 cmH20] 5 cmH20  Mean Airway Pressure:  [11 ctX66-24 cmH20] 11 cmH20    Hemodynamic Parameters:       Lines/Drains:       Hemodialysis Catheter 08/26/19 2225 right femoral (Active)   Site Assessment Clean;Dry;Intact 8/27/2019  3:00 AM   Status Accessed 8/27/2019  3:00 AM   Flows Good 8/27/2019  3:00 AM   Dressing Intervention New dressing 8/26/2019 10:30 PM   Dressing Status Biopatch in place;Clean;Dry;Intact 8/27/2019  3:00 AM   Dressing Change Due 08/30/19 8/27/2019  3:00 AM   Verification by X-ray Other (Comment) 8/26/2019 10:30 PM   Dressing Occlusive 8/27/2019  3:00 AM   Daily Line Review Performed 8/27/2019  3:00 AM   Number of days: 0            Port A Cath Single Lumen 08/26/19 right subclavian (Active)   Dressing Type Transparent 8/27/2019  3:00 AM   Dressing Status Clean;Dry;Intact 8/27/2019  3:00 AM   Dressing Intervention Dressing reinforced 8/26/2019  3:00 PM   Dressing Change Due 09/02/19 8/27/2019  3:00 AM   Line Status Infusing 8/27/2019  3:00 AM   Daily Line Review Performed 8/27/2019  3:00 AM   Number of days: 1            Percutaneous Central Line Insertion/Assessment - triple lumen  08/26/19 1135 left internal jugular (Active)   Dressing biopatch in place;dressing dry and intact 8/27/2019  3:00 AM   Securement secured w/ sutures 8/27/2019  3:00 AM   Additional Site Signs no erythema;no warmth;no edema;no pain;no palpable cord;no streak formation;no drainage 8/27/2019  3:00 AM    Distal Patency/Care infusing 8/27/2019  3:00 AM   Medial Patency/Care infusing 8/27/2019  3:00 AM   Proximal Patency/Care infusing 8/27/2019  3:00 AM   Waveform normal 8/27/2019  3:00 AM   Line Interventions line leveled/zeroed 8/27/2019  3:00 AM   Dressing Change Due 09/02/19 8/27/2019  3:00 AM   Daily Line Review Performed 8/27/2019  3:00 AM   Number of days: 0            Peripheral IV - Single Lumen 08/26/19 22 G Right Hand (Active)   Site Assessment Clean;Dry;Intact 8/27/2019  3:00 AM   Line Status Infusing 8/27/2019  3:00 AM   Dressing Status Clean;Dry;Intact 8/27/2019  3:00 AM   Dressing Intervention Dressing reinforced 8/26/2019  3:00 PM   Dressing Change Due 08/30/19 8/27/2019  3:00 AM   Site Change Due 08/30/19 8/26/2019  7:00 PM   Reason Not Rotated Not due 8/27/2019  3:00 AM   Number of days: 1            Peripheral IV - Single Lumen 08/26/19 1119 22 G Left;Posterior Hand (Active)   Site Assessment Clean;Dry;Intact 8/27/2019  3:00 AM   Line Status Infusing 8/27/2019  3:00 AM   Dressing Status Clean;Dry;Intact 8/27/2019  3:00 AM   Dressing Intervention Dressing reinforced 8/26/2019  3:00 PM   Dressing Change Due 08/30/19 8/27/2019  3:00 AM   Site Change Due 08/30/19 8/26/2019  7:00 PM   Reason Not Rotated Not due 8/27/2019  3:00 AM   Number of days: 0            Peripheral IV - Single Lumen 08/26/19 1119 20 G Anterior;Right Forearm (Active)   Site Assessment Clean;Dry;Intact 8/27/2019  3:00 AM   Line Status Infusing 8/27/2019  3:00 AM   Dressing Status Clean;Dry;Intact 8/27/2019  3:00 AM   Dressing Intervention Dressing reinforced 8/26/2019  3:00 PM   Dressing Change Due 08/30/19 8/27/2019  3:00 AM   Site Change Due 08/30/19 8/26/2019  7:00 PM   Reason Not Rotated Not due 8/27/2019  3:00 AM   Number of days: 0            Arterial Line 08/26/19 1700 Right Femoral (Active)   Site Assessment Clean;Dry;Intact 8/27/2019  3:00 AM   Line Status Pulsatile blood flow 8/27/2019  3:00 AM   Art Line Waveform  "Appropriate 8/27/2019  3:00 AM   Arterial Line Interventions Zeroed and calibrated;Leveled;Connections checked and tightened;Flushed per protocol;Line pulled back 8/27/2019  3:00 AM   Color/Movement/Sensation Capillary refill less than 3 sec 8/27/2019  3:00 AM   Dressing Type Transparent 8/27/2019  3:00 AM   Dressing Status Clean;Dry;Intact 8/27/2019  3:00 AM   Dressing Change Due 08/30/19 8/27/2019  3:00 AM   Number of days: 0            NG/OG Tube 08/26/19 1140 orogastric 18 Fr. Center mouth (Active)   Placement Check placement verified by x-ray 8/27/2019  3:00 AM   Tolerance signs/symptoms of discomfort 8/27/2019  3:00 AM   Securement secured to commercial device 8/27/2019  3:00 AM   Clamp Status/Tolerance unclamped 8/27/2019  3:00 AM   Suction Setting/Drainage Method suction at;low;moderate 8/27/2019  3:00 AM   Insertion Site Appearance no redness, warmth, tenderness, skin breakdown, drainage 8/27/2019  3:00 AM   Drainage Green;Brown 8/27/2019  3:00 AM   Intake (mL) 60 mL 8/27/2019  9:00 AM   Number of days: 0            Urethral Catheter 08/26/19 1230 (Active)   Site Assessment Clean;Intact 8/27/2019  3:00 AM   Collection Container Urimeter 8/27/2019  3:00 AM   Securement Method secured to top of thigh w/ adhesive device 8/27/2019  3:00 AM   Catheter Care Performed no 8/27/2019  3:00 AM   Reason for Continuing Urinary Catheterization Critically ill in ICU requiring intensive monitoring 8/27/2019  3:00 AM   CAUTI Prevention Bundle StatLock in place w 1" slack;Intact seal between catheter & drainage tubing;Drainage bag off the floor;No dependent loops or kinks;Green sheeting clip in use;Drainage bag not overfilled (<2/3 full);Drainage bag below bladder 8/26/2019 11:00 PM   Output (mL) 10 mL 8/27/2019  9:00 AM   Number of days: 0       Significant Labs:  CBC:  Recent Labs   Lab 08/27/19  0615   WBC 7.54   RBC 2.82*   HGB 8.8*   HCT 28.3*   *   *   MCH 31.2*   MCHC 31.1*     BMP:  Recent Labs   Lab " 08/27/19  0615      K 4.5      CO2 26   BUN 8   CREATININE 0.7   CALCIUM 8.4*      Tacrolimus Levels:  Recent Labs   Lab 08/27/19  0445   TACROLIMUS 8.4     Microbiology:  Microbiology Results (last 7 days)     Procedure Component Value Units Date/Time    Culture, Respiratory [[id number]]  (Abnormal) Collected:  08/26/19 1441    Order Status:  Completed Specimen:  Respiratory from Bronchial Wash Updated:  08/27/19 0847     Respiratory Culture STAPHYLOCOCCUS AUREUS  Many  Susceptibility pending       Gram Stain (Respiratory) <10 epithelial cells per low power field.     Gram Stain (Respiratory) Few WBC's     Gram Stain (Respiratory) Many Gram positive cocci    Narrative:       Bronchial Wash    Blood culture #1 **CANNOT BE ORDERED STAT** [[id number]] Collected:  08/26/19 0848    Order Status:  Completed Specimen:  Blood from Line, Port A Cath Updated:  08/27/19 0605     Blood Culture, Routine Gram stain lisa bottle: Gram positive cocci in clusters resembling Staph       Results called to and read back by:Monet Martinez RN 08/27/2019  06:04    Blood culture #2 **CANNOT BE ORDERED STAT** [[id number]] Collected:  08/26/19 0859    Order Status:  Completed Specimen:  Blood from Wrist, Right Updated:  08/27/19 0325     Blood Culture, Routine Gram stain aer bottle: Gram positive cocci in clusters resembling Staph       Results called to and read back by:Monet Martinez RN 08/27/2019 03:23    AFB Culture & Smear [[id number]] Collected:  08/26/19 1441    Order Status:  Sent Specimen:  Respiratory from Bronchial Wash Updated:  08/26/19 1648    Fungus culture [[id number]] Collected:  08/26/19 1441    Order Status:  Sent Specimen:  Respiratory from Bronchial Wash Updated:  08/26/19 1647    Respiratory Infection Panel, Nasopharyngeal [[id number]] Collected:  08/26/19 1003    Order Status:  Completed Specimen:  Nasopharyngeal Swab Updated:  08/26/19 1344     Respiratory Infection Panel Source NP Swab     Adenovirus Not Detected      Coronavirus 229E Not Detected     Coronavirus HKU1 Not Detected     Coronavirus NL63 Not Detected     Coronavirus OC43 Not Detected     Human Metapneumovirus Not Detected     Human Rhinovirus/Enterovirus Not Detected     Influenza A (subtypes H1, H1-2009,H3) Not Detected     Influenza B Not Detected     Parainfluenza Virus 1 Not Detected     Parainfluenza Virus 2 Not Detected     Parainfluenza Virus 3 Not Detected     Parainfluenza Virus 4 Not Detected     Respiratory Syncytial Virus Not Detected     Bordetella Parapertussis (JF8927) Not Detected     Bordetella pertussis (ptxP) Not Detected     Chlamydia pneumoniae Not Detected     Mycoplasma pneumoniae Not Detected    Narrative:       For all other respiratory sources order [id number] Respiratory  Viral Panel by PCR (RVPCR)    Culture, Respiratory  - Cystic Fibrosis [[id number]]     Order Status:  Canceled Specimen:  Respiratory           I have reviewed all pertinent labs within the past 24 hours.    Diagnostic Results:  Chest X-Ray: I personally reviewed the films and findings are:, bilateral opacities with improved aeration as compared to previous CXR. Right pleural effusion.     No Further

## 2019-08-27 NOTE — SUBJECTIVE & OBJECTIVE
Interval History:   SLED started overnight for metabolic clearance. Patient tolerating a UFR of 350 ml/hr upon assessment this AM. CVP 21, 19 this AM. Patient intubated on sedation, FiO2 40%. Patient on Levophed and Vaso for BP support.     Review of patient's allergies indicates:   Allergen Reactions    Albuterol Palpitations    Colistin Anaphylaxis    Vancomycin analogues      Infusion reaction that does not resolve with slowing  Pt. States she can tolerate it when given with 50 mg Benadryl and ran over 3 hours    Neupogen [filgrastim] Other (See Comments)     Ostealgia after five daily doses of 300 mcg.      Bactrim [sulfamethoxazole-trimethoprim] Hives    Ceftazidime Hives     Pt stated can tolerate cefapine not ceftazidime    Ceftazidime     Dronabinol Other (See Comments)     Mental changes/hallucinations    Haldol [haloperidol lactate] Other (See Comments)     Seizure like activity    Nsaids (non-steroidal anti-inflammatory drug)      Cannot have due to lung transplant    Adhesive Rash     Cloth tape- please use tegaderm or paper tape    Aztreonam Rash    Ciprofloxacin Nausea And Vomiting     Projectile N/V, per patient.  Unwilling to retry therapy.     Current Facility-Administered Medications   Medication Frequency    0.9%  NaCl infusion (CRRT USE ONLY) Continuous    ceftolozane-tazobactam (ZERBAXA) 1,500 mg in dextrose 5 % 100 mL Q8H    chlorhexidine 0.12 % solution 15 mL BID    dexmedetomidine (PRECEDEX) 400mcg/100mL 0.9% NaCL infusion Continuous    dextrose 10% (D10W) Bolus PRN    dextrose 10% (D10W) Bolus PRN    diphenhydrAMINE 12.5 mg/5 mL elixir 25 mg Daily PRN    enoxaparin injection 40 mg Daily    fentaNYL 2500 mcg in 0.9% sodium chloride 250 mL infusion premix (titrating) Continuous    insulin regular (Humulin R) 100 Units in sodium chloride 0.9% 100 mL infusion Continuous    levalbuterol nebulizer solution 1.25 mg Q8H    LORazepam tablet 1 mg Q8H PRN    magnesium sulfate  2g in water 50mL IVPB (premix) PRN    micafungin 100 mg in sodium chloride 0.9 % 100 mL IVPB (ready to mix system) Q24H    norepinephrine 4 mg in dextrose 5% 250 mL infusion (premix) (titrating) Continuous    ondansetron injection 8 mg Q6H PRN    predniSONE tablet 15 mg Daily    propofol (DIPRIVAN) 10 mg/mL infusion Continuous    QUEtiapine tablet 25 mg Daily    QUEtiapine tablet 50 mg QHS    sodium chloride 0.9% bolus 1,000 mL Once    sodium phosphate 20.01 mmol in dextrose 5 % 250 mL IVPB PRN    sodium phosphate 30 mmol in dextrose 5 % 250 mL IVPB PRN    sodium phosphate 39.99 mmol in dextrose 5 % 250 mL IVPB PRN    topiramate tablet 25 mg BID    vasopressin (PITRESSIN) 0.2 Units/mL in dextrose 5 % 100 mL infusion Continuous    voriconazole tablet 200 mg BID       Objective:     Vital Signs (Most Recent):  Temp: 98.3 °F (36.8 °C) (08/27/19 1100)  Pulse: 103 (08/27/19 1345)  Resp: (!) 24 (08/27/19 0706)  BP: 105/72 (08/27/19 1200)  SpO2: 97 % (08/27/19 1345)  O2 Device (Oxygen Therapy): ventilator (08/27/19 1110) Vital Signs (24h Range):  Temp:  [97.7 °F (36.5 °C)-98.9 °F (37.2 °C)] 98.3 °F (36.8 °C)  Pulse:  [101-134] 103  Resp:  [6-28] 24  SpO2:  [90 %-100 %] 97 %  BP: ()/(54-75) 105/72  Arterial Line BP: ()/(47-82) 105/61     Weight: 52.2 kg (115 lb) (08/27/19 0930)  Body mass index is 22.46 kg/m².  Body surface area is 1.49 meters squared.    I/O last 3 completed shifts:  In: 2989.3 [I.V.:2889.3; IV Piggyback:100]  Out: 2451 [Urine:76; Other:2375]    Physical Exam   Constitutional: She appears well-developed.   Sedated on mechanical ventilation   HENT:   Head: Normocephalic and atraumatic.   Right Ear: External ear normal.   Left Ear: External ear normal.   Eyes: Right eye exhibits no discharge. Left eye exhibits no discharge. No scleral icterus.   Cardiovascular: Normal rate and regular rhythm.   Pulmonary/Chest: Effort normal. No respiratory distress. She has no wheezes. She has  rales.   Transmitted ventilator sounds   Musculoskeletal: She exhibits edema. She exhibits no tenderness or deformity.   Skin: There is pallor.   Nursing note and vitals reviewed.      Significant Labs:  CBC:   Recent Labs   Lab 08/27/19  1014   WBC 6.14   RBC 2.57*   HGB 7.9*   HCT 24.3*   *   MCV 95   MCH 30.7   MCHC 32.5     CMP:   Recent Labs   Lab 08/27/19  1014   *  204*   CALCIUM 8.3*  8.3*   ALBUMIN 2.2*  2.2*   PROT 6.8  6.8     137   K 4.5  4.5   CO2 22*  22*     100   BUN 12  12   CREATININE 1.1  1.1   ALKPHOS 150*  150*   ALT 10  10   AST 11  11   BILITOT 0.4  0.4

## 2019-08-27 NOTE — SUBJECTIVE & OBJECTIVE
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       Past Surgical History:   Procedure Laterality Date    ABDOMINAL SURGERY      peg tube     [id number]-[id number] N/A 5/19/2015    Performed by Deny Dias Jr., MD at [facility], [place], operated by [facility] OR    BIOPSY-BRONCHUS N/A 12/20/2016    Performed by Jake Alvarez MD at [facility] OR 2ND FLR    Bronchoscopy N/A 7/5/2019    Performed by Francisca Morin DO at [facility] OR [facility]    BRONCHOSCOPY Bilateral 12/11/2018    Performed by Francisca Morin DO at [facility] OR [facility]ND [facility]    BRONCHOSCOPY N/A 10/1/2018    Performed by Jake Alvarez MD at [facility] OR [facility]ND [facility]    BRONCHOSCOPY Bilateral 12/20/2016    Performed by Jake Alvarez MD at [facility] OR [facility]ND [facility]    BRONCHOSCOPY - flexible bronchoscopy with probable tissue biopsy CPT 71060 N/A 7/21/2015    Performed by Jake Alvarez MD at [facility] OR [facility]ND [facility]    BRONCHOSCOPY - flexible bronchoscopy with probable tissue biospy CPT 67327 N/A 10/20/2015    Performed by Jake Alvarez MD at [facility] OR [facility]ND [facility]    BRONCHOSCOPY - flexible bronchoscopy with tissue biopsy CPT 38356 N/A 9/29/2015    Performed by Jake Alvarez MD at [facility] OR [facility]ND [facility]    BRONCHOSCOPY - flexible bronchoscopy with tissue biopsy CPT 15847 N/A 5/26/2015    Performed by Jake Alvarez MD at [facility] OR [facility]ST [facility]LR    BRONCHOSCOPY flexible bronchoscopy with tissue biopsy N/A 9/8/2014    Performed by Jake Alvarez MD at [facility] OR 2ND [facility]    BRONCHOSCOPY flexible with possible tissue biopsy N/A 8/8/2014    Performed by Jake Alvarez MD at [facility] OR 2ND [facility]     BRONCHOSCOPY, BAL, BIOPSIES N/A 3/20/2018    Performed by Jake Alvarez MD at [facility] OR 2ND FLR    BRONCHOSCOPY-FIBEROPTIC N/A 1/29/2016    Performed by Joann Cifuentes DO at [facility] OR 2ND FLR    BRONCHOSCOPY; Bronchoscopy with BAL and transbronchial biopsies under general anesthesia N/A 5/29/2018    [facility]    CHOLECYSTECTOMY  2016    CHOLECYSTECTOMY-LAPAROSCOPIC N/A 7/22/2016    Performed by Joshua Goldberg, MD at [facility] OR 2ND FLR    COLONOSCOPY N/A 5/3/2019    Performed by Juancho Rich MD at [facility] ENDO (2ND FLR)    ENDOMETRIAL ABLATION  2015    KJB    ETHMOIDECTOMY Bilateral 4/9/2019    Performed by Adin Burks III[facility]    FESS      FESS Bilateral 10/21/2013    Performed [facility]    FESS, USING COMPUTER-ASSISTED NAVIGATION N/A 4/9/2019    Performed by Adin Burks III, MD at [facility] OR [address] [place] [address]    FINE NEEDLE ASPIRATION (FNA)  of a cervical lymphadenopathy  1/29/2016    Performed by Joann Cifuentes [facility] at [facility]    flex bronchoscopy with probable tissue biopsy N/A 7/31/2014    Performed by [facility] at [facility] OR 2ND FLR    flexible bronchoscopy with tissue biopsy N/A 12/11/2014    Performed by Jake Alvarez MD at [facility] OR [facility]    flexible bronchoscopy with tissue biopsy  CPT 93407 N/A 8/15/2019    Performed by Jake Alvarez MD at [facility] OR 2ND FLR    flexible bronchoscopy with tissue biopsy CPT 78420  N/A 7/26/2019    Performed by [facility] at [facility]    HYSTERECTOMY  04/2017    [facility]    INJECTION-VOCAL CORD Left 6/24/2014    Performed by Jared Whatley MD at [facility] OR [address] [place] [address]    [id number]-CATH to right chest and removal of portacath to left chests wall. Bilateral 6/24/2014    Performed by Zhen Dorado [facility] at [facility]    LARYNX SURGERY  2016    LUNG TRANSPLANT Bilateral 6/12/14    MAXILLARY ANTROSTOMY  4/9/2019     [facility]    MYRINGOTOMY W/ TUBES Right 04/2017    MYRINGOTOMY WITH INSERTION OF PE TUBES Right 4/15/2017    [facility]    PLACEMENT-TUBE-PEG  5/15/2014    Performed by David Moses MD at [facility] OR [address] [place] [address]    PORTACATH PLACEMENT Right     rt sc    REDO BILATERAL LUNG TRANSPLANT; CLAMSHELL Bilateral 6/18/2019    Performed by Jonathan Newby MD at [facility] OR [address] [place] [address]    REMOVAL-TUBE-PEG  5/15/2014    Performed by David Moses MD at [facility] OR [address] [place] [address]    RHC for lung re-transplant N/A 1/22/2019    Performed by Laura Rachel MD at [facility] CATH LAB    ROBOTIC ASSISTED LAPAROSCOPIC HYSTERECTOMY N/A 4/25/2017    Performed by Deny Dias Jr[facility] MD at [facility] OR    SALPINGECTOMY Bilateral 2015    KJB    SALPINGECTOMY-LAPAROSCOPIC Bilateral 5/19/2015    Performed by Deny Dias Jr., MD at [facility] OR    SINUS SURGERY FUNCTIONAL ENDOSCOPIC WITH NAVIGATION Bilateral 4/3/2017    Performed by Cesar Olsne MD at [facility] OR [address] [place] [address]    SINUS SURGERY FUNCTIONAL ENDOSCOPIC WITH NAVIGATION, 22798, 26568, 34476, 67625 Bilateral 2/5/2015    [facility]    SPHENOIDECTOMY Bilateral 4/9/2019    Performed by Adin Burks III, MD at [facility] OR [address] [place] [address]    THYROPLASTY - Medialization laryngoplasty, cricothyroid subluxation, arytenoid repositioning Left 8/2/2016    [facility]    TRANSPLANT-LUNG Bilateral 6/11/2014    Performed by Adolfo Huston MD at [facility] OR [address] [place] [address]       Review of patient's allergies indicates:   Allergen Reactions    Albuterol Palpitations    Colistin Anaphylaxis    Vancomycin analogues      Infusion reaction that does not resolve with slowing  Pt. States she can tolerate it when given with 50 mg Benadryl and ran over 3 hours    Neupogen [filgrastim] Other (See Comments)     Ostealgia after five daily doses of 300 mcg.       Bactrim [sulfamethoxazole-trimethoprim] Hives    Ceftazidime Hives     Pt stated can tolerate cefapine not ceftazidime    Ceftazidime     Dronabinol Other (See Comments)     Mental changes/hallucinations    Haldol [haloperidol lactate] Other (See Comments)     Seizure like activity    Nsaids (non-steroidal anti-inflammatory drug)      Cannot have due to lung transplant    Adhesive Rash     Cloth tape- please use tegaderm or paper tape    Aztreonam Rash    Ciprofloxacin Nausea And Vomiting     Projectile N/V, per patient.  Unwilling to retry therapy.     Current Facility-Administered Medications   Medication Frequency    ceftolozane-tazobactam (ZERBAXA) 1,500 mg in dextrose 5 % 100 mL Q8H    chlorhexidine 0.12 % solution 15 mL BID    dextrose 10% (D10W) Bolus PRN    dextrose 10% (D10W) Bolus PRN    enoxaparin injection 40 mg Daily    fentaNYL 2500 mcg in 0.9% sodium chloride 250 mL infusion premix (titrating) Continuous    insulin aspart U-100 (NovoLOG) 100 unit/mL (3 mL) pen     insulin regular (Humulin R) 100 Units in sodium chloride 0.9% 100 mL infusion Continuous    levalbuterol nebulizer solution 1.25 mg Q8H    LORazepam tablet 1 mg Q8H PRN    micafungin 100 mg in sodium chloride 0.9 % 100 mL IVPB (ready to mix system) Q24H    norepinephrine 4 mg in dextrose 5% 250 mL infusion (premix) (titrating) Continuous    ondansetron injection 8 mg Q6H PRN    [START ON 8/27/2019] predniSONE tablet 15 mg Daily    propofol (DIPRIVAN) 10 mg/mL infusion Continuous    [START ON 8/27/2019] QUEtiapine tablet 25 mg Daily    QUEtiapine tablet 50 mg QHS    sodium chloride 0.9% bolus 1,000 mL Once    topiramate tablet 25 mg BID    vasopressin (PITRESSIN) 0.2 Units/mL in dextrose 5 % 100 mL infusion Continuous    voriconazole tablet 200 mg BID     Family History     Problem Relation (Age of Onset)    Cancer Maternal Grandmother    Diabetes Maternal Grandfather    Drug abuse Maternal Grandfather     Hypertension Maternal Grandmother    Thrombocytopenia Maternal Grandfather        Tobacco Use    Smoking status: Never Smoker    Smokeless tobacco: Never Used   Substance and Sexual Activity    Alcohol use: No     Comment: Has not had an alcoholic drink in more than 2 months.    Drug use: No    Sexual activity: Yes     Partners: Male     Birth control/protection: See Surgical Hx     Comment: current partner since Oct 2016     Review of Systems   Constitutional: Positive for appetite change and fever.   HENT: Negative.    Respiratory: Positive for cough and shortness of breath.    Cardiovascular: Negative.    Gastrointestinal: Negative.    Genitourinary: Negative.    Musculoskeletal: Negative.      Objective:     Vital Signs (Most Recent):  Temp: 97.9 °F (36.6 °C) (08/26/19 1500)  Pulse: 110 (08/26/19 1934)  Resp: (!) 9 (08/26/19 1934)  BP: 101/65 (08/26/19 1934)  SpO2: 99 % (08/26/19 1934)  O2 Device (Oxygen Therapy): ventilator (08/26/19 1934) Vital Signs (24h Range):  Temp:  [97.9 °F (36.6 °C)-99.3 °F (37.4 °C)] 97.9 °F (36.6 °C)  Pulse:  [101-147] 110  Resp:  [6-35] 9  SpO2:  [76 %-99 %] 99 %  BP: ()/(44-86) 101/65  Arterial Line BP: (91-99)/(47-53) 93/50     Weight: 52.5 kg (115 lb 11.9 oz) (08/26/19 1034)  Body mass index is 22.6 kg/m².  Body surface area is 1.49 meters squared.    I/O last 3 completed shifts:  In: 1398.3 [I.V.:1398.3]  Out: 40 [Urine:40]    Physical Exam   Constitutional: She appears well-developed.   Sedated on mechanical ventilation   HENT:   Head: Normocephalic.   Cardiovascular: Normal rate and regular rhythm.   Pulmonary/Chest:   Transmitted ventilator sounds   Nursing note and vitals reviewed.      Significant Labs:  ABGs:   Recent Labs   Lab 08/26/19  1652   PH 7.185*   PCO2 45.1*   HCO3 17.0*   POCSATURATED 99   BE -11     CBC:   Recent Labs   Lab 08/26/19  1804   WBC 8.27   RBC 2.94*   HGB 9.0*   HCT 30.4*   *   *   MCH 30.6   MCHC 29.6*     CMP:   Recent Labs    Lab 08/26/19  1804   *   CALCIUM 8.1*   ALBUMIN 2.2*   PROT 6.2   *   K 5.3*   CO2 17*      BUN 37*   CREATININE 2.8*   ALKPHOS 146*   ALT 11   AST 19   BILITOT 0.7     All labs within the past 24 hours have been reviewed.    Significant Imaging:  X-Ray: Reviewed  Personally reviewed

## 2019-08-27 NOTE — SUBJECTIVE & OBJECTIVE
Interval HPI:   Overnight events:  BG is now within goal on current intensive IV insulin infusion. With stable rate of 1.8 units/hr. Patient remains intubated and sedated.     Eating:   NPO  Nausea: No  Hypoglycemia and intervention: No  Fever: No  TPN and/or TF: No  If yes, type of TF/TPN and rate: None    /73 (BP Location: Right arm, Patient Position: Lying)   Pulse (!) 114   Temp 98 °F (36.7 °C) (Oral)   Resp (!) 25   Ht 5' (1.524 m)   Wt 52.2 kg (115 lb)   LMP 10/02/2016 (LMP Unknown)   SpO2 98%   Breastfeeding? No   BMI 22.46 kg/m²     Labs Reviewed and Include    Recent Labs   Lab 08/27/19  1422   *  175*   CALCIUM 8.5*  8.5*   ALBUMIN 2.2*  2.2*   PROT 7.1     136   K 4.8  4.8   CO2 25  25     100   BUN 8  8   CREATININE 0.8  0.8   ALKPHOS 161*   ALT 9*   AST 22   BILITOT 0.5     Lab Results   Component Value Date    WBC 5.46 08/27/2019    HGB 7.8 (L) 08/27/2019    HCT 24.7 (L) 08/27/2019    MCV 97 08/27/2019     (L) 08/27/2019     Recent Labs   Lab 08/26/19  0847   TSH 1.120     Lab Results   Component Value Date    HGBA1C 4.7 01/09/2019       Nutritional status:   Body mass index is 22.46 kg/m².  Lab Results   Component Value Date    ALBUMIN 2.2 (L) 08/27/2019    ALBUMIN 2.2 (L) 08/27/2019    ALBUMIN 2.2 (L) 08/27/2019    ALBUMIN 2.2 (L) 08/27/2019     Lab Results   Component Value Date    PREALBUMIN 15 (L) 05/29/2014    PREALBUMIN 11 (L) 05/09/2014    PREALBUMIN 18 (L) 03/19/2014       Estimated Creatinine Clearance: 73.9 mL/min (based on SCr of 0.8 mg/dL).    Accu-Checks  Recent Labs     08/27/19  0610 08/27/19  0752 08/27/19  0811 08/27/19  0855 08/27/19  1014 08/27/19  1104 08/27/19  1224 08/27/19  1301 08/27/19  1420 08/27/19  1510   POCTGLUCOSE 123* 146* 145* 169* 206* 205* 175* 171* 177* 169*       Current Medications and/or Treatments Impacting Glycemic Control  Immunotherapy:    Immunosuppressants     None        Steroids:   Hormones (From  admission, onward)    Start     Stop Route Frequency Ordered    08/27/19 0900  predniSONE tablet 15 mg      -- OG Daily 08/26/19 1106    08/26/19 1031  vasopressin (PITRESSIN) 0.2 Units/mL in dextrose 5 % 100 mL infusion      -- IV Continuous 08/26/19 1031        Pressors:    Autonomic Drugs (From admission, onward)    Start     Stop Route Frequency Ordered    08/26/19 1145  norepinephrine 4 mg in dextrose 5% 250 mL infusion (premix) (titrating)     Question Answer Comment   Titrate by: (in mcg/kg/min) 0.05    Titrate interval: (in minutes) 5    Titrate to maintain: (MAP or SBP) MAP    Greater than: (in mmHg) 65    Maximum dose: (in mcg/kg/min) 3        -- IV Continuous 08/26/19 1042        Hyperglycemia/Diabetes Medications:   Antihyperglycemics (From admission, onward)    Start     Stop Route Frequency Ordered    08/26/19 1549  insulin regular (Humulin R) 100 Units in sodium chloride 0.9% 100 mL infusion     Question:  Insulin Rate Adjustment (DO NOT MODIFY ANSWER)  Answer:  [email or web address]\Images\Pharmacy\InsulinInfusions\InsulinRegAdj [id number]    -- IV Continuous 08/26/19 1549    08/26/19 1515  insulin aspart U-100 (NovoLOG) 100 unit/mL (3 mL) pen     Note to Pharmacy:  Created by cabinet override    08/27 0329   08/26/19 1518

## 2019-08-27 NOTE — PLAN OF CARE
Problem: Adult Inpatient Plan of Care  Goal: Plan of Care Review  Pt currently resting comfortably in bed - VSS. Sats >96% on Vent settings AC 40%/5 PEEp, -130's ST, afebrile, MAP >65 maintained with levo and vaso. Continuous gtt infusing include propofol, fentanyl, precedex, CRRT infusion, and insulin. BS checked Q1H until 1600 - now checked Q2H - insulin drip titrated per nomogram. UO minimal 0-10ml/hr via saleh. CVP 16-21 during shift. CRRT on SLED UF of 400ml/hr - tolerating without increasing pressors. WV 0ml output per shift -  Changed per WC nurse today. NG output 75ml green/brown and thin output. PT was alert during sedation vacation and moved all extremities puposefully and communicated via dry erase board.  View Flowsheet data for detailed assessment information. Plan to possibly extubate tomorrow. POC reviewed with patient and spouse - all questions and concerns addressed and answered appropriately. Will continue to monitor closely.

## 2019-08-27 NOTE — ASSESSMENT & PLAN NOTE
BG goal 140 - 180     Continue IV insulin infusion protocol  Requires intensive BG monitoring while on protocol     Discharge planning: SAURABH

## 2019-08-27 NOTE — PROCEDURES
"Juanita Ibarra is a 29 y.o. female patient.    Temp: 97.9 °F (36.6 °C) (08/26/19 1900)  Pulse: 107 (08/26/19 2215)  Resp: (!) 8 (08/26/19 2115)  BP: 110/75 (08/26/19 2200)  SpO2: 100 % (08/26/19 2215)  Weight: 52.5 kg (115 lb 11.9 oz) (08/26/19 1034)  Height: 5' (152.4 cm) (08/26/19 1149)       Central Line  Date/Time: 8/26/2019 10:33 PM  Location procedure was performed: [facility] CRITICAL CARE MEDICINE  Performed by: Rodolfo Mccormack MD  Assisting provider: Jake Alvarez MD  Pre-operative Diagnosis: acute renal failure  Post-operative diagnosis: acute renal failure  Consent Done: Yes  Time out: Immediately prior to procedure a "time out" was called to verify the correct patient, procedure, equipment, support staff and site/side marked as required.  Indications: hemodialysis  Anesthesia: local infiltration    Anesthesia:  Local Anesthetic: lidocaine 1% without epinephrine  Anesthetic total: 5 mL  Preparation: skin prepped with ChloraPrep  Skin prep agent dried: skin prep agent completely dried prior to procedure  Sterile barriers: all five maximum sterile barriers used - cap, mask, sterile gown, sterile gloves, and large sterile sheet  Hand hygiene: hand hygiene performed prior to central venous catheter insertion  Location details: right femoral  Site selection rationale: only access site available  Catheter type: trialysis  Catheter size: 12 Fr  Catheter Length: 16cm    Ultrasound guidance: yes  Vessel Caliber: medium, patent, compressibility normal  Needle advanced into vessel with real time Ultrasound guidance.  Guidewire confirmed in vessel.  Sterile sheath used.  Number of attempts: 1  Technical procedures used: ultrasound guided trialysis line  Complications: none  Estimated blood loss (mL): 0  Specimens: No  Implants: No  Post-procedure: line sutured,  chlorhexidine patch,  sterile dressing applied and blood return through all ports  Complications: No          Rodolfo Mccormack  8/26/2019  "

## 2019-08-27 NOTE — SUBJECTIVE & OBJECTIVE
Interval HPI:   Overnight events:   Now admitted to SICU. Patient is intubated and sedated. Spoke with family at bedside. BG is trending downward on IV intensive insulin infusion.   Eating:   NPO  Nausea: No  Hypoglycemia and intervention: No  Fever: No  TPN and/or TF: No  If yes, type of TF/TPN and rate: None    PMH, PSH, FH, SH reviewed     ROS:  Unable to obtain due to: Sedation,Intubation,Altered mental status,Critical illness,Reviewed ROS from note dated 08/26/2019 per Shama Canales PA-C      Current Medications and/or Treatments Impacting Glycemic Control  Immunotherapy:    Immunosuppressants     None        Steroids:   Hormones (From admission, onward)    Start     Stop Route Frequency Ordered    08/27/19 0900  predniSONE tablet 15 mg      -- OG Daily 08/26/19 1106    08/26/19 1031  vasopressin (PITRESSIN) 0.2 Units/mL in dextrose 5 % 100 mL infusion      -- IV Continuous 08/26/19 1031        Pressors:    Autonomic Drugs (From admission, onward)    Start     Stop Route Frequency Ordered    08/26/19 1145  norepinephrine 4 mg in dextrose 5% 250 mL infusion (premix) (titrating)     Question Answer Comment   Titrate by: (in mcg/kg/min) 0.05    Titrate interval: (in minutes) 5    Titrate to maintain: (MAP or SBP) MAP    Greater than: (in mmHg) 65    Maximum dose: (in mcg/kg/min) 3        -- IV Continuous 08/26/19 1042        Hyperglycemia/Diabetes Medications:   Antihyperglycemics (From admission, onward)    Start     Stop Route Frequency Ordered    08/26/19 1549  insulin regular (Humulin R) 100 Units in sodium chloride 0.9% 100 mL infusion     Question:  Insulin Rate Adjustment (DO NOT MODIFY ANSWER)  Answer:  [email or web address]\Images\Pharmacy\InsulinInfusions\InsulinRegAdj [id number]    -- IV Continuous 08/26/19 1549    08/26/19 1515  insulin aspart U-100 (NovoLOG) 100 unit/mL (3 mL) pen     Note to Pharmacy:  Created by cabinet override    08/27 0329   08/26/19 1515             PHYSICAL  EXAMINATION:  Vitals:    08/26/19 1934   BP: 101/65   Pulse: 110   Resp: (!) 9   Temp:      Body mass index is 22.6 kg/m².    Physical Exam   Constitutional: Well developed, well nourished, NAD. Sedated.  ENT: External ears no masses with nose patent  Neck: Supple; trachea midline; no thyromegaly.  Cardiovascular: heart sounds present with no LE edema.   Lungs: lungs anterior bilaterally clear to auscultation. Intubated on ventilator.  Abdomen: Soft, no masses, no hernias.  MS: No clubbing or cyanosis of nails noted unable to assess gait.  Skin: No rashes, lesions, or ulcers; no nodules.  Foot: nails in good condition, no amputations noted.

## 2019-08-27 NOTE — CONSULTS
[facility]  Nephrology  Consult Note    Patient Name: Juanita Ibarra  MRN: [id number]  Admission Date: 8/26/2019  Hospital Length of Stay: 0 days  Attending Provider: Jake Alvarez MD   Primary Care Physician: Primary Doctor No  Principal Problem:Septic shock    Inpatient consult to Nephrology  Consult performed by: Nomi Camara MD  Consult ordered by: Jake Alvarez MD  Reason for consult: LISBETH, Metabolic acidosis        Subjective:     HPI: A 30 y/o female s/p bilateral re-transplant (6/18/19) secondary to CF, who presented to the ED in acute respiratory distress. Patient's family member reports that she had and episode of fever 2 days ago accompanied by productive cough requiring increasing at home oxygen to ~5L. Also, caregiver reports poor appetite and PO intake over the last week. Upon arrival to ED, oxygen saturations were in mid 70s while on NRB and patient was placed on BiPAP which was transitioned to 25L comfort flow at 100% FiO2. On arrival to ED and worsening clinical status patient was transferred to the ICU, intubated, and started on Zerbaxa/Vancomycin.  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    Nephrology consulted for evaluation of LISBETH and Metabolic acidosis.    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       Past Surgical History:   Procedure Laterality Date    ABDOMINAL SURGERY      peg tube      [id number] N/A 5/19/2015    Performed by Deny Dias Jr., MD at [facility] OR    BIOPSY-BRONCHUS N/A 12/20/2016    Performed by Jake Alvarez MD at [facility] OR 2ND FLR    Bronchoscopy N/A 7/5/2019    Performed by Francisca Morin DO at [facility] OR 2ND FLR    BRONCHOSCOPY Bilateral 12/11/2018    Performed by Francisca Morin DO at [facility] OR 2ND FLR    BRONCHOSCOPY N/A 10/1/2018    Performed by Jake Alvarez MD at [facility] OR 2ND [facility]LR    BRONCHOSCOPY Bilateral 12/20/2016    Performed by Jake Alvarez MD at [facility] OR 2ND [facility]LR    BRONCHOSCOPY - flexible bronchoscopy with probable tissue biopsy CPT 06330 N/A 7/21/2015    Performed by Jake Alvarez MD at [facility] OR 2ND [facility]LR    BRONCHOSCOPY - flexible bronchoscopy with probable tissue biospy CPT 28364 N/A 10/20/2015    Performed by Jake Alvarez MD at [facility] OR 2ND FLR    BRONCHOSCOPY - flexible bronchoscopy with tissue biopsy CPT 77257 N/A 9/29/2015    Performed by Jake Alvarez MD at [facility] OR 2ND FLR    BRONCHOSCOPY - flexible bronchoscopy with tissue biopsy CPT 22425 N/A 5/26/2015    Performed by Jake Alvarez MD at [facility] OR 1ST FLR    BRONCHOSCOPY flexible bronchoscopy with tissue biopsy N/A 9/8/2014    Performed by Jake Alvarez MD at [facility] OR 2ND [facility]LR    BRONCHOSCOPY flexible with possible tissue biopsy N/A 8/8/2014    Performed by Jake Alvarez MD at [facility] OR 2ND FLR    BRONCHOSCOPY, BAL, BIOPSIES N/A 3/20/2018    Performed by Jkae Alvarez MD at [facility] OR 2ND FLR    BRONCHOSCOPY-FIBEROPTIC N/A 1/29/2016    Performed by Joann Cifuentes DO at [facility] OR 2ND FLR    BRONCHOSCOPY; Bronchoscopy with BAL and transbronchial biopsies under general anesthesia N/A 5/29/2018    Performed by Jake Alvarez MD at [facility] OR 2ND FLR    CHOLECYSTECTOMY  2016    CHOLECYSTECTOMY-LAPAROSCOPIC N/A 7/22/2016    Performed by Joshua Goldberg, MD at [facility] OR 2ND FLR    COLONOSCOPY N/A 5/3/2019    Performed by Juancho BRENNAN  MD Hilario at [facility] ENDO (2ND FLR)    ENDOMETRIAL ABLATION  2015    KJB    ETHMOIDECTOMY Bilateral 4/9/2019    Performed by Adin Burks III, MD at [facility] OR 2ND FLR    FESS      FESS Bilateral 10/21/2013    Performed by Jared Whatley MD at [facility] OR 2ND FLR    FESS, USING COMPUTER-ASSISTED NAVIGATION N/A 4/9/2019    Performed by Adin Burks III, MD at [facility] OR 2ND [facility]    FINE NEEDLE ASPIRATION (FNA)  of a cervical lymphadenopathy  1/29/2016    Performed by Joann Cifuentes DO at [facility] OR 2ND FLR    flex bronchoscopy with probable tissue biopsy N/A 7/31/2014    Performed by [facility] Diagnostic Provider at [facility] OR 2ND FLR    flexible bronchoscopy with tissue biopsy N/A 12/11/2014    Performed by Jake Alvarez MD at [facility] OR 2ND FLR    flexible bronchoscopy with tissue biopsy  CPT 72036 N/A 8/15/2019    Performed by Jake Alvarez MD at [facility] OR 2ND FLR    flexible bronchoscopy with tissue biopsy CPT 40496  N/A 7/26/2019    Performed by Jake Alvarez MD at [facility] OR 2ND FLR    HYSTERECTOMY  04/2017    [facility]    INJECTION-VOCAL CORD Left 6/24/2014    Performed by Jared Whatley MD at [facility] OR 2ND FLR    [id number]-CATH to right chest and removal of portacath to left chests wall. Bilateral 6/24/2014    Performed by Zhen Dorado MD at [facility] OR 2ND FLR    LARYNX SURGERY  2016    LUNG TRANSPLANT Bilateral 6/12/14    MAXILLARY ANTROSTOMY  4/9/2019    Performed by Adin Burks III, MD at [facility] OR 2ND FLR    MYRINGOTOMY W/ TUBES Right 04/2017    MYRINGOTOMY WITH INSERTION OF PE TUBES Right 4/15/2017    Performed by Gary Null MD at [facility] OR 2ND FLR    PLACEMENT-TUBE-PEG  5/15/2014    Performed by David Moses MD at [facility] OR 2ND FLR    PORTACATH PLACEMENT Right     rt sc    REDO BILATERAL LUNG TRANSPLANT; CLAMSHELL Bilateral 6/18/2019    Performed by Jonathan Newby MD at [facility] OR 2ND FLR    REMOVAL-TUBE-PEG  5/15/2014    Performed by David Moses MD at [facility]  OR 2ND FLR    RHC for lung re-transplant N/A 1/22/2019    Performed by Laura Rachel MD at [facility] CATH LAB    ROBOTIC ASSISTED LAPAROSCOPIC HYSTERECTOMY N/A 4/25/2017    Performed by Deny Dias Jr., MD at [facility] OR    SALPINGECTOMY Bilateral 2015    [facility]    SALPINGECTOMY-LAPAROSCOPIC Bilateral 5/19/2015    Performed by Deny Dias Jr., MD at [facility] OR    SINUS SURGERY FUNCTIONAL ENDOSCOPIC WITH NAVIGATION Bilateral 4/3/2017    Performed by Cesar Olsen MD at [facility] OR 2ND [facility]LR    SINUS SURGERY FUNCTIONAL ENDOSCOPIC WITH NAVIGATION, 03825, 05013, 13990, 03727 Bilateral 2/5/2015    Performed by Cesar Olsen MD at [facility] OR 2ND [facility]LR    SPHENOIDECTOMY Bilateral 4/9/2019    [facility]    THYROPLASTY - Medialization laryngoplasty, cricothyroid subluxation, arytenoid repositioning Left 8/2/2016    Performed by Gary Null MD at [facility] OR 2ND [facility]    TRANSPLANT-LUNG Bilateral 6/11/2014    Performed by Adolfo Huston MD at [facility] OR 2ND FLR       Review of patient's allergies indicates:   Allergen Reactions    Albuterol Palpitations    Colistin Anaphylaxis    Vancomycin analogues      Infusion reaction that does not resolve with slowing  Pt. States she can tolerate it when given with 50 mg Benadryl and ran over 3 hours    Neupogen [filgrastim] Other (See Comments)     Ostealgia after five daily doses of 300 mcg.      Bactrim [sulfamethoxazole-trimethoprim] Hives    Ceftazidime Hives     Pt stated can tolerate cefapine not ceftazidime    Ceftazidime     Dronabinol Other (See Comments)     Mental changes/hallucinations    Haldol [haloperidol lactate] Other (See Comments)     Seizure like activity    Nsaids (non-steroidal anti-inflammatory drug)      Cannot have due to lung transplant    Adhesive Rash     Cloth tape- please use tegaderm or paper tape    Aztreonam Rash    Ciprofloxacin Nausea And Vomiting     Projectile N/V, per patient.  Unwilling  to retry therapy.     Current Facility-Administered Medications   Medication Frequency    ceftolozane-tazobactam (ZERBAXA) 1,500 mg in dextrose 5 % 100 mL Q8H    chlorhexidine 0.12 % solution 15 mL BID    dextrose 10% (D10W) Bolus PRN    dextrose 10% (D10W) Bolus PRN    enoxaparin injection 40 mg Daily    fentaNYL 2500 mcg in 0.9% sodium chloride 250 mL infusion premix (titrating) Continuous    insulin aspart U-100 (NovoLOG) 100 unit/mL (3 mL) pen     insulin regular (Humulin R) 100 Units in sodium chloride 0.9% 100 mL infusion Continuous    levalbuterol nebulizer solution 1.25 mg Q8H    LORazepam tablet 1 mg Q8H PRN    micafungin 100 mg in sodium chloride 0.9 % 100 mL IVPB (ready to mix system) Q24H    norepinephrine 4 mg in dextrose 5% 250 mL infusion (premix) (titrating) Continuous    ondansetron injection 8 mg Q6H PRN    [START ON 8/27/2019] predniSONE tablet 15 mg Daily    propofol (DIPRIVAN) 10 mg/mL infusion Continuous    [START ON 8/27/2019] QUEtiapine tablet 25 mg Daily    QUEtiapine tablet 50 mg QHS    sodium chloride 0.9% bolus 1,000 mL Once    topiramate tablet 25 mg BID    vasopressin (PITRESSIN) 0.2 Units/mL in dextrose 5 % 100 mL infusion Continuous    voriconazole tablet 200 mg BID     Family History     Problem Relation (Age of Onset)    Cancer Maternal Grandmother    Diabetes Maternal Grandfather    Drug abuse Maternal Grandfather    Hypertension Maternal Grandmother    Thrombocytopenia Maternal Grandfather        Tobacco Use    Smoking status: Never Smoker    Smokeless tobacco: Never Used   Substance and Sexual Activity    Alcohol use: No     Comment: Has not had an alcoholic drink in more than 2 months.    Drug use: No    Sexual activity: Yes     Partners: Male     Birth control/protection: See Surgical Hx     Comment: current partner since Oct 2016     Review of Systems   Constitutional: Positive for appetite change and fever.   HENT: Negative.    Respiratory: Positive  for cough and shortness of breath.    Cardiovascular: Negative.    Gastrointestinal: Negative.    Genitourinary: Negative.    Musculoskeletal: Negative.      Objective:     Vital Signs (Most Recent):  Temp: 97.9 °F (36.6 °C) (08/26/19 1500)  Pulse: 110 (08/26/19 1934)  Resp: (!) 9 (08/26/19 1934)  BP: 101/65 (08/26/19 1934)  SpO2: 99 % (08/26/19 1934)  O2 Device (Oxygen Therapy): ventilator (08/26/19 1934) Vital Signs (24h Range):  Temp:  [97.9 °F (36.6 °C)-99.3 °F (37.4 °C)] 97.9 °F (36.6 °C)  Pulse:  [101-147] 110  Resp:  [6-35] 9  SpO2:  [76 %-99 %] 99 %  BP: ()/(44-86) 101/65  Arterial Line BP: (91-99)/(47-53) 93/50     Weight: 52.5 kg (115 lb 11.9 oz) (08/26/19 1034)  Body mass index is 22.6 kg/m².  Body surface area is 1.49 meters squared.    I/O last 3 completed shifts:  In: 1398.3 [I.V.:1398.3]  Out: 40 [Urine:40]    Physical Exam   Constitutional: She appears well-developed.   Sedated on mechanical ventilation   HENT:   Head: Normocephalic.   Cardiovascular: Normal rate and regular rhythm.   Pulmonary/Chest:   Transmitted ventilator sounds   Nursing note and vitals reviewed.      Significant Labs:  ABGs:   Recent Labs   Lab 08/26/19  1652   PH 7.185*   PCO2 45.1*   HCO3 17.0*   POCSATURATED 99   BE -11     CBC:   Recent Labs   Lab 08/26/19  1804   WBC 8.27   RBC 2.94*   HGB 9.0*   HCT 30.4*   *   *   MCH 30.6   MCHC 29.6*     CMP:   Recent Labs   Lab 08/26/19  1804   *   CALCIUM 8.1*   ALBUMIN 2.2*   PROT 6.2   *   K 5.3*   CO2 17*      BUN 37*   CREATININE 2.8*   ALKPHOS 146*   ALT 11   AST 19   BILITOT 0.7     All labs within the past 24 hours have been reviewed.    Significant Imaging:  X-Ray: Reviewed  Personally reviewed    Assessment/Plan:     LISBETH (acute kidney injury)  Ms. 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 Her baseline SCr in the past year has been  around ~ 1.0. LISBETH likely secondary to ATN caused by hypotension/sepsis in the setting of severe metabolic acidosis.    Recommendations:    - SLED for  metabolic clearance secondary to persistent acidosis.    - Strict I/O and chart  - Avoid nephrotoxic medications, NSAIDs, IV contrast, etc.  - Medication doses adjusted to GFR  - Maintain MAP > 65  - Will follow closely          Thank you for your consult. I will follow-up with patient. Please contact us if you have any additional questions.    Nomi Camara MD  Nephrology  [facility]    ATTENDING PHYSICIAN ATTESTATION  I have personally interviewed and examined the patient. I thoroughly reviewed the demographic, clinical, laboratorial and imaging information available in medical records. I agree with the assessment and recommendations provided by the subspecialty resident. Dr. Camara was under my supervision.     DIALYSIS NOTE  Patient evaluated while undergoing Slow Low Efficiency Dialysis (SLED) indicated for LISBETH and hemodynamic instability. No complications, tolerating session with current UFR. Will continue current support.

## 2019-08-27 NOTE — ASSESSMENT & PLAN NOTE
Started on CRRT for metabolic acidosis and oliguric LISBETH. Discussed volume removal given her CVP of 21 today. Will continue to hold nephrotoxic meds. Nephrology following appreciate recs.

## 2019-08-27 NOTE — PROCEDURES
"Juanita Ibarra is a 29 y.o. female patient.    Temp: 97.9 °F (36.6 °C) (08/26/19 1500)  Pulse: 110 (08/26/19 1934)  Resp: (!) 9 (08/26/19 1934)  BP: 101/65 (08/26/19 1934)  SpO2: 99 % (08/26/19 1934)  Weight: 52.5 kg (115 lb 11.9 oz) (08/26/19 1034)  Height: 5' (152.4 cm) (08/26/19 1149)       Central Line  Date/Time: 8/26/2019 8:45 PM  Location procedure was performed: [facility] SURGICAL ICU (SICU)  Performed by: Jake Alvarez MD  Assisting provider: Shama Canales PA-C  Pre-operative Diagnosis: Septic Shock  Post-operative diagnosis: Septic Shock  Consent Done: Yes  Time out: Immediately prior to procedure a "time out" was called to verify the correct patient, procedure, equipment, support staff and site/side marked as required.  Indications: vascular access and hemodynamic monitoring  Anesthesia: local infiltration  Preparation: skin prepped with ChloraPrep  Sterile barriers: all five maximum sterile barriers used - cap, mask, sterile gown, sterile gloves, and large sterile sheet  Location details: left internal jugular  Catheter type: triple lumen  Ultrasound guidance: yes  Vessel Caliber: large, patent, compressibility normal  Needle advanced into vessel with real time Ultrasound guidance.  Guidewire confirmed in vessel.  Manometry: No   Number of attempts: 2  Assessment: placement verified by x-ray and no pneumothorax on x-ray  Estimated blood loss (mL): 5  Specimens: No  Implants: No  Post-procedure: line sutured,  chlorhexidine patch,  sterile dressing applied and blood return through all ports  Complications: No          Jake Alvarez  8/26/2019  "

## 2019-08-27 NOTE — PROGRESS NOTES
Dr. Alvarez and RRT at bedside for bronchoscopy. Patient tolerated well. Cultures collected and sent.

## 2019-08-27 NOTE — ASSESSMENT & PLAN NOTE
Multifactorial given staph bacteremia and PNA. History of recurrent  pseudomonal infections and MRSA. Continue empiric Zerbaxa and Vancomycin. Maintain MAPs >65. Will follow up on cultures and adjust therapy as needed.

## 2019-08-28 PROBLEM — R78.81 BACTEREMIA DUE TO STAPHYLOCOCCUS: Status: ACTIVE | Noted: 2019-01-01

## 2019-08-28 PROBLEM — B95.8 BACTEREMIA DUE TO STAPHYLOCOCCUS: Status: ACTIVE | Noted: 2019-01-01

## 2019-08-28 PROBLEM — R78.81 BACTEREMIA: Status: ACTIVE | Noted: 2019-01-01

## 2019-08-28 NOTE — HPI
Ms Ibarra is a 28yo female w/hx of cystic fibrosis s/p redo bilateral lung transplant (initial tx 2014, redo 6/18/19) who had a port placed by Dr. Dorado in 2014 for recurrent infections. General surgery was consulted for port removal.    Patient has had a complicated course following lung transplant including respiratory failure requiring intubation, renal failure requiring CRRT and now with staph bacteremia (blood cultures positive for staph aureus on 8/26). Primary team requesting port removal due to bacteremia. Patient only on subcu heparin for DVT prophylaxis. Platelets 117 this morning. On low dose levophed .01, vaso while on CRRT.

## 2019-08-28 NOTE — ASSESSMENT & PLAN NOTE
Ms. 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 Her baseline SCr in the past year has been around ~ 1.0. LISBETH likely secondary to ATN caused by hypotension/sepsis in the setting of severe metabolic acidosis.    Recommendations:    - Will switch to SCUF and continue for volume management, target -350 ml/hr as tolerated. CVP this AM 20 with signs of moderate edema on CXR per read. Patient remains intubated, FiO2 remains at 40%. Still not making adequate urine.    - Strict I/O and chart  - Avoid nephrotoxic medications, NSAIDs, IV contrast, etc.  - Medication doses adjusted to GFR  - Maintain MAP > 65  - Will follow closely

## 2019-08-28 NOTE — ASSESSMENT & PLAN NOTE
Blood cultures from 8/26 with staph. Right chest port removed today. Will follow up on repeat cultures. ID following, appreciate recs.

## 2019-08-28 NOTE — CONSULTS
[facility]  Infectious Disease  Consult Note    Patient Name: Juanita Ibarra  MRN: [id number]  Admission Date: 8/26/2019  Hospital Length of Stay: 2 days  Attending Physician: Jake Alvarez MD  Primary Care Provider: Primary Doctor No     Isolation Status: Neutropenic    Patient information was obtained from patient and past medical records.      Inpatient consult to Infectious Diseases  Consult performed by: Juanita Francis MD  Consult ordered by: Shama Canales PA-C  Reason for consult: MRSA septicemia        Assessment/Plan:     * Septic shock  28yo woman w/a history of CF (c/b pancreatic insufficiency, sinus disease, MRSA/Pseudomonas colonization c/b numerous antibiotics allergies, and subsequent COPD/ESLD; s/p BOLT 6/12/2014, CMV D+/R-, simulect induction, on maintenance tacro/MMF/pred; c/b multiple episodes of MRSA/Pseudomonas pneumonia/sinusitis, C.glabrata early post-operative colonization 7/2014, A1 rejection 8/2014 s/p pulse SM, prior CMV reactivations, and subsequent grade 3 CARLOS EDUARDO; s/p redo BOLT 6/18/2019, CMV D-/R+, basiliximab induction, on maintenance tacro/MMF/pred; c/b  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 She is weaning from pressors on vancomycin, empiric zerbaxa, and ongoing voriconazole/micafungin. Notably U/S shows R jugular DVT adjacent to R SC port.    - would continue vancomycin (goal trough 15-20) for MRSA septicemia and pneumonia  - if BAL cx are negative for Pseudomonas, can stop zerbaxa tomorrow  - would continue voriconazole for prior invasive aspergillosis; await BAL antigen from recent bronch; repeat level  -  may continue micafungin while awaiting yeast ID but likely commensal Candida that doesn't require additional coverage  - remainder of prophylaxis per protocol  - would remove port as now presumed infected given MRSA septicemia  - TTE negative -- consider SUZY while patient is still intubated  - contact precautions for MRSA          Thank you for your consult. I will follow-up with patient. Please contact us if you have any additional questions.     Juanita Francis MD  Transplant ID Attending  [phone]    Juanita Francis MD  Infectious Disease  [facility]    Subjective:     Principal Problem: Septic shock    HPI: Mrs. Ibarra is 28yo woman w/a history of CF (c/b pancreatic insufficiency, sinus disease, MRSA/Pseudomonas colonization c/b numerous antibiotics allergies, and subsequent COPD/ESLD; s/p BOLT 6/12/2014, CMV D+/R-, simulect induction, on maintenance tacro/MMF/pred; c/b multiple episodes of MRSA/Pseudomonas pneumonia/sinusitis, C.glabrata early post-operative colonization 7/2014, A1 rejection 8/2014 s/p pulse SM, prior CMV reactivations, and subsequent grade 3 CARLOS EDUARDO; s/p redo BOLT 6/18/2019, CMV D-/R+, basiliximab induction, on maintenance tacro/MMF/pred; c/b  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 She is weaning from pressors on vancomycin, empiric zerbaxa, and ongoing voriconazole/micafungin.    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 OR    BIOPSY-BRONCHUS N/A 12/20/2016    Performed by Jake Alvarez MD at [facility] OR 2ND FLR    Bronchoscopy N/A 7/5/2019    Performed by Francisca Morin DO at [facility] OR 2ND FLR    BRONCHOSCOPY Bilateral 12/11/2018    Performed by Francisca Morin DO at [facility] OR 2ND FLR    BRONCHOSCOPY N/A 10/1/2018    Performed by Jake Alvarez MD at [facility] OR 2ND FLR    BRONCHOSCOPY Bilateral 12/20/2016    Performed by Jake Alvarez MD at [facility] OR 2ND FLR    BRONCHOSCOPY - flexible bronchoscopy with probable tissue biopsy CPT 25926 N/A 7/21/2015    Performed by Jake Alvarez MD at [facility] OR 2ND FLR    BRONCHOSCOPY - flexible bronchoscopy with probable tissue biospy CPT 29400 N/A 10/20/2015    Performed by Jake Alvarez MD at [facility] OR 2ND FLR    BRONCHOSCOPY - flexible bronchoscopy with tissue biopsy CPT 86184 N/A 9/29/2015    Performed by Jake Alvarez MD at [facility] OR 2ND FLR    BRONCHOSCOPY - flexible bronchoscopy with tissue biopsy CPT 94242 N/A 5/26/2015    Performed by Jake Alvarez MD at [facility] OR 1ST FLR    BRONCHOSCOPY flexible bronchoscopy with tissue biopsy N/A 9/8/2014    Performed by Jake Alvarez MD at [facility] OR 2ND FLR    BRONCHOSCOPY flexible with possible tissue biopsy N/A 8/8/2014    Performed by Jake Alvarez MD at [facility] OR 2ND FLR    BRONCHOSCOPY, BAL, BIOPSIES N/A 3/20/2018    Performed by Jake Alvarez MD at [facility] OR 2ND FLR    BRONCHOSCOPY-FIBEROPTIC N/A 1/29/2016    Performed by Joann Cifuentes DO at [facility] OR 2ND FLR    BRONCHOSCOPY; Bronchoscopy with BAL and  transbronchial biopsies under general anesthesia N/A 5/29/2018    [facility]    CHOLECYSTECTOMY  2016    CHOLECYSTECTOMY-LAPAROSCOPIC N/A 7/22/2016    Performed by Joshua Goldberg, MD at [facility] OR [address] [place] [address]    COLONOSCOPY N/A 5/3/2019    Performed by Juancho Rich MD at [facility] ENDO (2ND FLR)    ENDOMETRIAL ABLATION  2015    KJB    ETHMOIDECTOMY Bilateral 4/9/2019    Performed by Adin Burks III, MD at [facility] OR 2ND FLR    FESS      FESS Bilateral 10/21/2013    Performed by Jared Whatley MD at [facility] OR [address] [place] [address]    FESS, USING COMPUTER-ASSISTED NAVIGATION N/A 4/9/2019    Performed by Adin Burks III, MD at [facility] OR [address] [place] [address]    FINE NEEDLE ASPIRATION (FNA)  of a cervical lymphadenopathy  1/29/2016    Performed by Joann Cifuentes DO at [facility] OR [address] [place] [address]    flex bronchoscopy with probable tissue biopsy N/A 7/31/2014    Performed by [facility]    flexible bronchoscopy with tissue biopsy N/A 12/11/2014    [facility]    flexible bronchoscopy with tissue biopsy  CPT 27951 N/A 8/15/2019    [facility]    flexible bronchoscopy with tissue biopsy CPT 56828  N/A 7/26/2019    [facility]    HYSTERECTOMY  04/2017    [facility]    INJECTION-VOCAL CORD Left 6/24/2014    Performed by Jared Whatley MD at [facility] OR [address] [place] [address]    [id number] to right chest and removal of portacath to left chests wall. Bilateral 6/24/2014    Performed by Zhen Dorado MD at [facility] OR [address] [place] [address]    LARYNX SURGERY  2016    LUNG TRANSPLANT Bilateral 6/12/14    MAXILLARY ANTROSTOMY  4/9/2019    Performed by Adin Burks III, MD at [facility] OR [address] [place] [address]    MYRINGOTOMY W/ TUBES Right 04/2017    MYRINGOTOMY WITH INSERTION OF PE TUBES Right 4/15/2017    Performed by Gary Null MD at [facility] OR [address] [place] [address]    PLACEMENT-TUBE-PEG  5/15/2014     [facility]    PORTACATH PLACEMENT Right     rt sc    REDO BILATERAL LUNG TRANSPLANT; CLAMSHELL Bilateral 6/18/2019    Performed by Jonathan Newby MD at [facility] OR [address] [place] [address]    REMOVAL-TUBE-PEG  5/15/2014    Performed by [facility] Moses [facility]    RHC for lung re-transplant N/A 1/22/2019    Performed by Laura Rachel MD at [facility] CATH LAB    ROBOTIC ASSISTED LAPAROSCOPIC HYSTERECTOMY N/A 4/25/2017    Performed by Deny Dias Jr., MD at [facility] OR    SALPINGECTOMY Bilateral 2015    KJB    SALPINGECTOMY-LAPAROSCOPIC Bilateral 5/19/2015    Performed by Deny Dias Jr., MD at [facility] OR    SINUS SURGERY FUNCTIONAL ENDOSCOPIC WITH NAVIGATION Bilateral 4/3/2017    Performed by Cesar Olsen MD at [facility]    SINUS SURGERY FUNCTIONAL ENDOSCOPIC WITH NAVIGATION, 42400, 31481, 53372, 90511 Bilateral 2/5/2015    Performed by Cesar Olsen MD at [facility]    SPHENOIDECTOMY Bilateral 4/9/2019    Performed by Adin Burks III, MD at [facility] OR [address] [place] [address]    THYROPLASTY - Medialization laryngoplasty, cricothyroid subluxation, arytenoid repositioning Left 8/2/2016    Performed by Gary Null MD at [facility] OR [facility] FLR    TRANSPLANT-LUNG Bilateral 6/11/2014    Performed by Adolfo Huston MD at [facility] OR [address] [place] [address]       Review of patient's allergies indicates:   Allergen Reactions    Albuterol Palpitations    Colistin Anaphylaxis    Vancomycin analogues      Infusion reaction that does not resolve with slowing  Pt. States she can tolerate it when given with 50 mg Benadryl and ran over 3 hours    Neupogen [filgrastim] Other (See Comments)     Ostealgia after five daily doses of 300 mcg.      Bactrim [sulfamethoxazole-trimethoprim] Hives    Ceftazidime Hives     Pt stated can tolerate cefapine not ceftazidime    Ceftazidime     Dronabinol Other (See Comments)     Mental changes/hallucinations    Haldol [haloperidol lactate]  Other (See Comments)     Seizure like activity    Nsaids (non-steroidal anti-inflammatory drug)      Cannot have due to lung transplant    Adhesive Rash     Cloth tape- please use tegaderm or paper tape    Aztreonam Rash    Ciprofloxacin Nausea And Vomiting     Projectile N/V, per patient.  Unwilling to retry therapy.       Medications:  Medications Prior to Admission   Medication Sig    blood sugar diagnostic Strp Preferred by insurance. Testing BG 4 times daily.    blood-glucose meter Misc Use to check blood glucose 4 (four) times daily.    calcium-vitamin D3 (OS-KATY 500 + D3) 500 mg(1,250mg) -200 unit per tablet Take 1 tablet by mouth 2 (two) times daily with meals.    dapsone 100 MG Tab Take 1 tablet (100 mg total) by mouth once daily.    diphenhydrAMINE (BENADRYL) 50 MG tablet Take 1 tablet (50 mg total) by mouth every 6 (six) hours as needed for Itching.    fexofenadine (ALLEGRA) 180 MG tablet Take 180 mg by mouth once daily.    fluticasone propionate (FLONASE) 50 mcg/actuation nasal spray INSERT 2 SPRAYS IN EACH NOSTRIL DAILY    folic acid (FOLVITE) 1 MG tablet Take 1 tablet (1,000 mcg total) by mouth once daily.    gabapentin (NEURONTIN) 300 MG capsule Take 1 capsule (300 mg total) by mouth 3 (three) times daily.    hydrocortisone 1 % cream Apply topically 2 (two) times daily.    inhalation spacing device (PROCHAMBER) USE AS DIRECTED    insulin aspart U-100 (NOVOLOG) 100 unit/mL (3 mL) InPn pen Inject 1-10 Units into the skin before meals and at bedtime as needed (Hyperglycemia).    lancets 33 gauge Misc Use to check blood glucose four times daily    levalbuterol (XOPENEX) 1.25 mg/3 mL nebulizer solution Take 3 mLs (1.25 mg total) by nebulization every 8 (eight) hours as needed for Wheezing or Shortness of Breath. Rescue    lipase-protease-amylase (CREON) 36,000-114,000- 180,000 unit CpDR Take 4 capsules by mouth 3 (three) times daily with meals. Take 3 with snacks as needed. (Patient  "taking differently: Take 5 capsules by mouth 3 (three) times daily with meals. )    LORazepam (ATIVAN) 1 MG tablet Take 1 tablet (1 mg total) by mouth every 8 (eight) hours as needed for Anxiety.    magnesium oxide (MAG-OX) 400 mg (241.3 mg magnesium) tablet Take 1 tablet (400 mg total) by mouth 3 (three) times daily.    metoprolol tartrate (LOPRESSOR) 25 MG tablet Take 1 tablet (25 mg total) by mouth 2 (two) times daily.    montelukast (SINGULAIR) 10 mg tablet Take 1 tablet (10 mg total) by mouth once daily.    morphine (MS CONTIN) 15 MG 12 hr tablet Take 1 tablet by mouth twice daily    multivit,min52-folic-vitK-cQ10 (AQUADEKS) 100-700-10 mcg-mcg-mg Cap cap 1 tab twice daily    omeprazole (PRILOSEC) 40 MG capsule Take 1 capsule (40 mg total) by mouth once daily.    ondansetron (ZOFRAN) 8 MG tablet Take 1 tablet (8 mg total) by mouth every 12 (twelve) hours as needed for Nausea.    oxyCODONE (ROXICODONE) 5 MG immediate release tablet take 1 tablet by mouth four times a day (Patient taking differently: 10 mg every 6 (six) hours as needed. )    pen needle, diabetic 32 gauge x 5/32" Ndle Injecting 3 times a day    polyethylene glycol (GLYCOLAX) 17 gram PwPk Take 17 g by mouth 2 (two) times daily.    predniSONE (DELTASONE) 5 MG tablet Take 3 tablets (15 mg total) by mouth once daily.    QUEtiapine (SEROQUEL) 25 MG Tab Take 1 tablet by mouth in morning and 2 tablets at bedtime    SITagliptin (JANUVIA) 100 MG Tab Take 1 tablet (100 mg total) by mouth once daily.    tacrolimus (PROGRAF) 0.5 MG Cap Take 1 capsule (0.5 mg total) by mouth every morning. Total daily dose is 1.5 mg every morning and 1 mg every evening.    tacrolimus (PROGRAF) 1 MG Cap Take 1 capsule (1 mg total) by mouth every 12 (twelve) hours.    tiZANidine (ZANAFLEX) 4 MG tablet Take 1 tablet (4 mg total) by mouth 2 (two) times daily.    topiramate (TOPAMAX) 25 MG tablet Take 1 tablet (25 mg total) by mouth 2 (two) times daily    " valGANciclovir (VALCYTE) 450 mg Tab Take 1 tablet (450 mg total) by mouth 2 (two) times daily.    venlafaxine (EFFEXOR-XR) 150 MG Cp24 Take 1 capsule (150 mg total) by mouth once daily.    venlafaxine (EFFEXOR-XR) 37.5 MG 24 hr capsule Take 1 capsule (37.5 mg total) by mouth once daily.    voriconazole (VFEND) 200 MG Tab Take 1 tablet (200 mg total) by mouth 2 (two) times daily.     Antibiotics (From admission, onward)    Start     Stop Route Frequency Ordered    08/26/19 1130  ceftolozane-tazobactam (ZERBAXA) 1,500 mg in dextrose 5 % 100 mL      -- IV Every 8 hours (non-standard times) 08/26/19 1020        Antifungals (From admission, onward)    Start     Stop Route Frequency Ordered    08/26/19 2100  voriconazole tablet 200 mg      -- OG 2 times daily 08/26/19 1106    08/26/19 1130  micafungin 100 mg in sodium chloride 0.9 % 100 mL IVPB (ready to mix system)      -- IV Every 24 hours (non-standard times) 08/26/19 1022        Antivirals (From admission, onward)    None           Immunization History   Administered Date(s) Administered    Cytomegalovirus Immune Globulin 06/13/2014    DTP 08/23/1994, 08/16/1995    Hepatitis A, Adult 04/04/2018, 01/22/2019    Hepatitis B 08/10/2000, 11/20/2000    Hepatitis B, Pediatric/Adolescent 11/28/2001    Influenza 09/12/2018    Influenza - Quadrivalent - PF (6 months and older) 10/04/2017, 09/06/2018    Influenza - Trivalent (ADULT) 11/28/2001, 01/22/2002, 12/10/2002, 11/26/2003, 10/19/2004, 11/15/2005, 11/11/2006, 10/15/2007, 10/28/2009, 11/03/2010, 10/19/2011    Influenza - Trivalent - PF (ADULT) 10/14/2008, 10/22/2012, 11/02/2016    Influenza A (H1N1) 2009 Monovalent - IM - PF 12/01/2009    MMR 08/23/1994, 08/16/1995    Meningococcal Conjugate (MCV4P) 10/24/2008    OPV 08/23/1994, 08/16/1995, 08/10/2000    Pneumococcal Conjugate - 13 Valent 10/02/2017    Pneumococcal Conjugate - 7 Valent 01/22/2002    Pneumococcal Polysaccharide - 23 Valent 01/22/2002     Td (ADULT) 08/10/2000, 12/16/2010    Tdap 01/22/2019    Typhoid - ViCPs 04/06/2018       Family History     Problem Relation (Age of Onset)    Cancer Maternal Grandmother    Diabetes Maternal Grandfather    Drug abuse Maternal Grandfather    Hypertension Maternal Grandmother    Thrombocytopenia Maternal Grandfather        Social History     Socioeconomic History    Marital status: Single     Spouse name: Not on file    Number of children: Not on file    Years of education: Not on file    Highest education level: Not on file   Occupational History    Not on file   Social Needs    Financial resource strain: Not on file    Food insecurity:     Worry: Not on file     Inability: Not on file    Transportation needs:     Medical: Not on file     Non-medical: Not on file   Tobacco Use    Smoking status: Never Smoker    Smokeless tobacco: Never Used   Substance and Sexual Activity    Alcohol use: No     Comment: Has not had an alcoholic drink in more than 2 months.    Drug use: No    Sexual activity: Yes     Partners: Male     Birth control/protection: See Surgical Hx     Comment: current partner since Oct 2016   Lifestyle    Physical activity:     Days per week: Not on file     Minutes per session: Not on file    Stress: Not on file   Relationships    Social connections:     Talks on phone: Not on file     Gets together: Not on file     Attends Mu-ism service: Not on file     Active member of club or organization: Not on file     Attends meetings of clubs or organizations: Not on file     Relationship status: Not on file   Other Topics Concern    Not on file   Social History Narrative    Not on file     Review of Systems   Unable to perform ROS: Intubated     Objective:     Vital Signs (Most Recent):  Temp: 98.5 °F (36.9 °C) (08/28/19 1100)  Pulse: 93 (08/28/19 1356)  Resp: (!) 24 (08/28/19 0731)  BP: 110/82 (08/28/19 1200)  SpO2: 99 % (08/28/19 1356) Vital Signs (24h Range):  Temp:  [98 °F (36.7  °C)-98.6 °F (37 °C)] 98.5 °F (36.9 °C)  Pulse:  [] 93  Resp:  [24-25] 24  SpO2:  [96 %-100 %] 99 %  BP: ()/(56-82) 110/82  Arterial Line BP: ()/(47-79) 108/53     Weight: 51.9 kg (114 lb 6.7 oz)  Body mass index is 22.35 kg/m².    Estimated Creatinine Clearance: 98.5 mL/min (based on SCr of 0.6 mg/dL).    Physical Exam   Constitutional: She appears well-developed and well-nourished. She has a sickly appearance. No distress. She is intubated.   HENT:   Head: Normocephalic.   Nose: Nose normal.   Eyes: Conjunctivae are normal. No scleral icterus.   Neck: Normal range of motion. Neck supple. No JVD present. No tracheal deviation present.   Cardiovascular: Regular rhythm and normal heart sounds. Tachycardia present. Exam reveals no gallop and no friction rub.   No murmur heard.  Pulmonary/Chest: She is intubated. No respiratory distress. She has decreased breath sounds in the right lower field. She has no wheezes. She has rhonchi in the right upper field, the right middle field, the left upper field and the left middle field. She has no rales.   Mechanical BS bilaterally   Abdominal: Soft. Bowel sounds are normal. She exhibits no distension. There is no tenderness.   Genitourinary:   Genitourinary Comments: saleh   Musculoskeletal: Normal range of motion. She exhibits no edema, tenderness or deformity.   Neurological:   Follows commands and moves all 4 extremities   Skin: Skin is warm and dry. No erythema. There is pallor.   Nursing note and vitals reviewed.      Significant Labs:   CBC:   Recent Labs   Lab 08/27/19  1014 08/27/19  1422 08/28/19  0415   WBC 6.14 5.46 4.67   HGB 7.9* 7.8* 8.1*   HCT 24.3* 24.7* 26.2*   * 128* 117*     CMP:   Recent Labs   Lab 08/27/19  1014 08/27/19  1422 08/27/19  2200 08/28/19  0415     137 136  136 139 139   K 4.5  4.5 4.8  4.8 4.8 4.9     100 100  100 100 104   CO2 22*  22* 25  25 26 21*   *  204* 175*  175* 117* 152*   BUN 12   12 8  8 3* 4*   CREATININE 1.1  1.1 0.8  0.8 0.6 0.6   CALCIUM 8.3*  8.3* 8.5*  8.5* 9.1 8.8   PROT 6.8  6.8 7.1  --  8.6*   ALBUMIN 2.2*  2.2* 2.2*  2.2* 2.5* 2.5*  2.5*   BILITOT 0.4  0.4 0.5  --  0.4   ALKPHOS 150*  150* 161*  --  173*   AST 11  11 22  --  15   ALT 10  10 9*  --  10   ANIONGAP 15  15 11  11 13 14   EGFRNONAA >60.0  >60.0 >60.0  >60.0 >60.0 >60.0       Significant Imaging: I have reviewed all pertinent imaging results/findings within the past 24 hours.

## 2019-08-28 NOTE — PLAN OF CARE
Problem: Adult Inpatient Plan of Care  Goal: Plan of Care Review  Pt currently resting comfortably in bed - VSS. Sats >97% on Vent settings Spontaneous 40%/5 PEEp, HR 's SR/ST, afebrile, MAP >65 maintained with vaso and on/off levo - currently off. Continuous gtt infusing include propofol, fentanyl, precedex, CRRT infusion, and insulin. BS checked Q2H until - insulin drip titrated per nomogram, insulin turned on @ 1630. UO 0-3ml/hr via saleh. CVP 18-20 during shift. CRRT on SCUF UF of 350ml/hr - tolerating without increasing pressors. WV 0ml output per shift. NG output 150ml green/brown and thin output. PT was alert during sedation vacation and moved all extremities puposefully and communicated via dry erase board.  View Flowsheet data for detailed assessment information. Plan to possibly extubate tomorrow. POC reviewed with patient and spouse - all questions and concerns addressed and answered appropriately. Will continue to monitor closely.

## 2019-08-28 NOTE — ASSESSMENT & PLAN NOTE
30yo female w/cystic fibrosis s/p redo lung transplant (6/18/19) now with staph bacteremia who primary team is requesting port removal for infection risk    - port removal at bedside in the ICU, consent obtained from significant other at the bedside who is medical power of  (see procedure note below)  - ok to remove outer dressing in 2 days  - will sign off at this time, please call with questions

## 2019-08-28 NOTE — SUBJECTIVE & OBJECTIVE
Interval HPI:   Overnight events:  BG is within goal on current IV intensive insulin infusion. Patient remains intubated as per chart review. NAEON.    Eating:   NPO  Nausea: No  Hypoglycemia and intervention: No  Fever: No  TPN and/or TF: No  If yes, type of TF/TPN and rate: None    BP 90/63 (BP Location: Right arm, Patient Position: Lying)   Pulse 95   Temp 98.2 °F (36.8 °C) (Oral)   Resp (!) 24   Ht 5' (1.524 m)   Wt 51.9 kg (114 lb 6.7 oz)   LMP 10/02/2016 (LMP Unknown)   SpO2 100%   Breastfeeding? No   BMI 22.35 kg/m²     Labs Reviewed and Include    Recent Labs   Lab 08/28/19  0415   *   CALCIUM 8.8   ALBUMIN 2.5*  2.5*   PROT 8.6*      K 4.9   CO2 21*      BUN 4*   CREATININE 0.6   ALKPHOS 173*   ALT 10   AST 15   BILITOT 0.4     Lab Results   Component Value Date    WBC 4.67 08/28/2019    HGB 8.1 (L) 08/28/2019    HCT 26.2 (L) 08/28/2019     (H) 08/28/2019     (L) 08/28/2019     Recent Labs   Lab 08/26/19  0847   TSH 1.120     Lab Results   Component Value Date    HGBA1C 4.7 01/09/2019       Nutritional status:   Body mass index is 22.35 kg/m².  Lab Results   Component Value Date    ALBUMIN 2.5 (L) 08/28/2019    ALBUMIN 2.5 (L) 08/28/2019    ALBUMIN 2.5 (L) 08/27/2019     Lab Results   Component Value Date    PREALBUMIN 15 (L) 05/29/2014    PREALBUMIN 11 (L) 05/09/2014    PREALBUMIN 18 (L) 03/19/2014       Estimated Creatinine Clearance: 98.5 mL/min (based on SCr of 0.6 mg/dL).    Accu-Checks  Recent Labs     08/27/19  1420 08/27/19  1510 08/27/19  1601 08/27/19  1745 08/27/19 2012 08/27/19  2206 08/27/19  2327 08/28/19  0132 08/28/19  0414 08/28/19  0547   POCTGLUCOSE 177* 169* 157* 165* 120* 119* 133* 155* 161* 169*       Current Medications and/or Treatments Impacting Glycemic Control  Immunotherapy:    Immunosuppressants     None        Steroids:   Hormones (From admission, onward)    Start     Stop Route Frequency Ordered    08/27/19 0900  predniSONE tablet 15  mg      -- OG Daily 08/26/19 1106    08/26/19 1031  vasopressin (PITRESSIN) 0.2 Units/mL in dextrose 5 % 100 mL infusion      -- IV Continuous 08/26/19 1031        Pressors:    Autonomic Drugs (From admission, onward)    Start     Stop Route Frequency Ordered    08/26/19 1145  norepinephrine 4 mg in dextrose 5% 250 mL infusion (premix) (titrating)     Question Answer Comment   Titrate by: (in mcg/kg/min) 0.05    Titrate interval: (in minutes) 5    Titrate to maintain: (MAP or SBP) MAP    Greater than: (in mmHg) 65    Maximum dose: (in mcg/kg/min) 3        -- IV Continuous 08/26/19 1042        Hyperglycemia/Diabetes Medications:   Antihyperglycemics (From admission, onward)    Start     Stop Route Frequency Ordered    08/26/19 1549  insulin regular (Humulin R) 100 Units in sodium chloride 0.9% 100 mL infusion     Question:  Insulin Rate Adjustment (DO NOT MODIFY ANSWER)  Answer:  [email or web address]    -- IV Continuous 08/26/19 1549    08/26/19 1515  insulin aspart U-100 (NovoLOG) 100 unit/mL (3 mL) pen     Note to Pharmacy:  Created by cabinet override    08/27 0329   08/26/19 1516

## 2019-08-28 NOTE — PROGRESS NOTES
[facility]  Nephrology  Progress Note    Patient Name: Juanita Ibarra  MRN: [id number]  Admission Date: 8/26/2019  Hospital Length of Stay: 2 days  Attending Provider: Jake Alvarez MD   Primary Care Physician: Primary Doctor No  Principal Problem:Septic shock    Subjective:     Interval History: Seen on SLED this AM, patient with good clearance and volume removal. Patient net negative 2.4 L/24 hrs. Remains anuric, 44 ml/24 hrs. On Vasopressin and Levophed for BP support. CVP this AM 20.    Review of patient's allergies indicates:   Allergen Reactions    Albuterol Palpitations    Colistin Anaphylaxis    Vancomycin analogues      Infusion reaction that does not resolve with slowing  Pt. States she can tolerate it when given with 50 mg Benadryl and ran over 3 hours    Neupogen [filgrastim] Other (See Comments)     Ostealgia after five daily doses of 300 mcg.      Bactrim [sulfamethoxazole-trimethoprim] Hives    Ceftazidime Hives     Pt stated can tolerate cefapine not ceftazidime    Ceftazidime     Dronabinol Other (See Comments)     Mental changes/hallucinations    Haldol [haloperidol lactate] Other (See Comments)     Seizure like activity    Nsaids (non-steroidal anti-inflammatory drug)      Cannot have due to lung transplant    Adhesive Rash     Cloth tape- please use tegaderm or paper tape    Aztreonam Rash    Ciprofloxacin Nausea And Vomiting     Projectile N/V, per patient.  Unwilling to retry therapy.     Current Facility-Administered Medications   Medication Frequency    0.9%  NaCl infusion (CRRT USE ONLY) Continuous    ceftolozane-tazobactam (ZERBAXA) 1,500 mg in dextrose 5 % 100 mL Q8H    chlorhexidine 0.12 % solution 15 mL BID    dexmedetomidine (PRECEDEX) 400mcg/100mL 0.9% NaCL infusion Continuous    dextrose 10% (D10W) Bolus PRN    dextrose 10% (D10W) Bolus PRN    diphenhydrAMINE 12.5 mg/5 mL elixir 25 mg Daily PRN    fentaNYL 2500 mcg in 0.9% sodium  chloride 250 mL infusion premix (titrating) Continuous    heparin (porcine) injection 5,000 Units Q12H    insulin regular (Humulin R) 100 Units in sodium chloride 0.9% 100 mL infusion Continuous    levalbuterol nebulizer solution 1.25 mg Q8H    LORazepam tablet 1 mg Q8H PRN    magnesium sulfate 2g in water 50mL IVPB (premix) PRN    micafungin 100 mg in sodium chloride 0.9 % 100 mL IVPB (ready to mix system) Q24H    norepinephrine 4 mg in dextrose 5% 250 mL infusion (premix) (titrating) Continuous    ondansetron injection 8 mg Q6H PRN    predniSONE tablet 15 mg Daily    propofol (DIPRIVAN) 10 mg/mL infusion Continuous    QUEtiapine tablet 25 mg Daily    QUEtiapine tablet 50 mg QHS    sodium chloride 0.9% bolus 1,000 mL Once    sodium phosphate 20.01 mmol in dextrose 5 % 250 mL IVPB PRN    sodium phosphate 30 mmol in dextrose 5 % 250 mL IVPB PRN    sodium phosphate 39.99 mmol in dextrose 5 % 250 mL IVPB PRN    topiramate tablet 25 mg BID    vancomycin (VANCOCIN) 1,250 mg in dextrose 5 % 250 mL IVPB Once    vasopressin (PITRESSIN) 0.2 Units/mL in dextrose 5 % 100 mL infusion Continuous    voriconazole tablet 200 mg BID       Objective:     Vital Signs (Most Recent):  Temp: 98.5 °F (36.9 °C) (08/28/19 1100)  Pulse: 104 (08/28/19 1200)  Resp: (!) 24 (08/28/19 0731)  BP: 110/82 (08/28/19 1200)  SpO2: 99 % (08/28/19 1200)  O2 Device (Oxygen Therapy): ventilator (08/28/19 1155) Vital Signs (24h Range):  Temp:  [98 °F (36.7 °C)-98.6 °F (37 °C)] 98.5 °F (36.9 °C)  Pulse:  [] 104  Resp:  [24-25] 24  SpO2:  [96 %-100 %] 99 %  BP: ()/(56-82) 110/82  Arterial Line BP: ()/(47-79) 123/65     Weight: 51.9 kg (114 lb 6.7 oz) (08/28/19 0400)  Body mass index is 22.35 kg/m².  Body surface area is 1.48 meters squared.    I/O last 3 completed shifts:  In: 8809.3 [I.V.:8514.3; NG/GT:95; IV Piggyback:200]  Out: 40385 [Urine:80; Drains:200; Other:61396]    Physical Exam   Constitutional: She appears  well-developed. She is sedated and intubated.   Sedated on mechanical ventilation   HENT:   Head: Normocephalic and atraumatic.   Right Ear: External ear normal.   Left Ear: External ear normal.   Eyes: Right eye exhibits no discharge. Left eye exhibits no discharge. No scleral icterus.   Cardiovascular: Normal rate and regular rhythm.   Pulmonary/Chest: Effort normal. She is intubated. No respiratory distress. She has no wheezes. She has rales.   Transmitted ventilator sounds   Musculoskeletal: She exhibits edema. She exhibits no tenderness or deformity.   Skin: There is pallor.   Nursing note and vitals reviewed.      Significant Labs:  CBC:   Recent Labs   Lab 08/28/19 0415   WBC 4.67   RBC 2.56*   HGB 8.1*   HCT 26.2*   *   *   MCH 31.6*   MCHC 30.9*     CMP:   Recent Labs   Lab 08/28/19 0415   *   CALCIUM 8.8   ALBUMIN 2.5*  2.5*   PROT 8.6*      K 4.9   CO2 21*      BUN 4*   CREATININE 0.6   ALKPHOS 173*   ALT 10   AST 15   BILITOT 0.4          Assessment/Plan:     * Septic shock  - Being managed by primary team     LISBETH (acute kidney injury)  Ms. 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 Her baseline SCr in the past year has been around ~ 1.0. LISBETH likely secondary to ATN caused by hypotension/sepsis in the setting of severe metabolic acidosis.    Recommendations:    - Will switch to SCUF and continue for volume management, target -350 ml/hr as tolerated. CVP this AM 20 with signs of moderate edema on CXR per read. Patient remains intubated, FiO2 remains at 40%. Still not making adequate urine.    - Strict I/O and chart  - Avoid nephrotoxic medications, NSAIDs, IV contrast, etc.  - Medication doses adjusted to GFR  - Maintain MAP > 65  - Will follow closely      Thank you for your consult. I will follow-up with patient. Please contact us if you have any additional  questions.    Rema Lunsford DNP, FNP-C  Nephrology  [facility]    ATTENDING PHYSICIAN ATTESTATION  I have personally interviewed and examined the patient. I thoroughly reviewed the demographic, clinical, laboratorial and imaging information available in medical records. I agree with the assessment and recommendations provided by the KUMAR Lunsford who was under my supervision.      DIALYSIS NOTE  Patient evaluated while undergoing Slow Low Efficiency Dialysis (SLED) indicated for LISBETH and hemodynamic instability. No complications, tolerating session with current UFR. Will continue current support.

## 2019-08-28 NOTE — SUBJECTIVE & OBJECTIVE
No current facility-administered medications on file prior to encounter.      Current Outpatient Medications on File Prior to Encounter   Medication Sig    blood sugar diagnostic Strp Preferred by insurance. Testing BG 4 times daily.    blood-glucose meter Misc Use to check blood glucose 4 (four) times daily.    calcium-vitamin D3 (OS-KATY 500 + D3) 500 mg(1,250mg) -200 unit per tablet Take 1 tablet by mouth 2 (two) times daily with meals.    dapsone 100 MG Tab Take 1 tablet (100 mg total) by mouth once daily.    diphenhydrAMINE (BENADRYL) 50 MG tablet Take 1 tablet (50 mg total) by mouth every 6 (six) hours as needed for Itching.    fexofenadine (ALLEGRA) 180 MG tablet Take 180 mg by mouth once daily.    fluticasone propionate (FLONASE) 50 mcg/actuation nasal spray INSERT 2 SPRAYS IN EACH NOSTRIL DAILY    folic acid (FOLVITE) 1 MG tablet Take 1 tablet (1,000 mcg total) by mouth once daily.    gabapentin (NEURONTIN) 300 MG capsule Take 1 capsule (300 mg total) by mouth 3 (three) times daily.    hydrocortisone 1 % cream Apply topically 2 (two) times daily.    inhalation spacing device (PROCHAMBER) USE AS DIRECTED    insulin aspart U-100 (NOVOLOG) 100 unit/mL (3 mL) InPn pen Inject 1-10 Units into the skin before meals and at bedtime as needed (Hyperglycemia).    lancets 33 gauge Misc Use to check blood glucose four times daily    levalbuterol (XOPENEX) 1.25 mg/3 mL nebulizer solution Take 3 mLs (1.25 mg total) by nebulization every 8 (eight) hours as needed for Wheezing or Shortness of Breath. Rescue    lipase-protease-amylase (CREON) 36,000-114,000- 180,000 unit CpDR Take 4 capsules by mouth 3 (three) times daily with meals. Take 3 with snacks as needed. (Patient taking differently: Take 5 capsules by mouth 3 (three) times daily with meals. )    LORazepam (ATIVAN) 1 MG tablet Take 1 tablet (1 mg total) by mouth every 8 (eight) hours as needed for Anxiety.    magnesium oxide (MAG-OX) 400 mg (241.3 mg  "magnesium) tablet Take 1 tablet (400 mg total) by mouth 3 (three) times daily.    metoprolol tartrate (LOPRESSOR) 25 MG tablet Take 1 tablet (25 mg total) by mouth 2 (two) times daily.    montelukast (SINGULAIR) 10 mg tablet Take 1 tablet (10 mg total) by mouth once daily.    morphine (MS CONTIN) 15 MG 12 hr tablet Take 1 tablet by mouth twice daily    multivit,min52-folic-vitK-cQ10 (AQUADEKS) 100-700-10 mcg-mcg-mg Cap cap 1 tab twice daily    omeprazole (PRILOSEC) 40 MG capsule Take 1 capsule (40 mg total) by mouth once daily.    ondansetron (ZOFRAN) 8 MG tablet Take 1 tablet (8 mg total) by mouth every 12 (twelve) hours as needed for Nausea.    oxyCODONE (ROXICODONE) 5 MG immediate release tablet take 1 tablet by mouth four times a day (Patient taking differently: 10 mg every 6 (six) hours as needed. )    pen needle, diabetic 32 gauge x 5/32" Ndle Injecting 3 times a day    polyethylene glycol (GLYCOLAX) 17 gram PwPk Take 17 g by mouth 2 (two) times daily.    predniSONE (DELTASONE) 5 MG tablet Take 3 tablets (15 mg total) by mouth once daily.    QUEtiapine (SEROQUEL) 25 MG Tab Take 1 tablet by mouth in morning and 2 tablets at bedtime    SITagliptin (JANUVIA) 100 MG Tab Take 1 tablet (100 mg total) by mouth once daily.    tacrolimus (PROGRAF) 0.5 MG Cap Take 1 capsule (0.5 mg total) by mouth every morning. Total daily dose is 1.5 mg every morning and 1 mg every evening.    tacrolimus (PROGRAF) 1 MG Cap Take 1 capsule (1 mg total) by mouth every 12 (twelve) hours.    tiZANidine (ZANAFLEX) 4 MG tablet Take 1 tablet (4 mg total) by mouth 2 (two) times daily.    topiramate (TOPAMAX) 25 MG tablet Take 1 tablet (25 mg total) by mouth 2 (two) times daily    valGANciclovir (VALCYTE) 450 mg Tab Take 1 tablet (450 mg total) by mouth 2 (two) times daily.    venlafaxine (EFFEXOR-XR) 150 MG Cp24 Take 1 capsule (150 mg total) by mouth once daily.    venlafaxine (EFFEXOR-XR) 37.5 MG 24 hr capsule Take 1 " capsule (37.5 mg total) by mouth once daily.    voriconazole (VFEND) 200 MG Tab Take 1 tablet (200 mg total) by mouth 2 (two) times daily.       Review of patient's allergies indicates:   Allergen Reactions    Albuterol Palpitations    Colistin Anaphylaxis    Vancomycin analogues      Infusion reaction that does not resolve with slowing  Pt. States she can tolerate it when given with 50 mg Benadryl and ran over 3 hours    Neupogen [filgrastim] Other (See Comments)     Ostealgia after five daily doses of 300 mcg.      Bactrim [sulfamethoxazole-trimethoprim] Hives    Ceftazidime Hives     Pt stated can tolerate cefapine not ceftazidime    Ceftazidime     Dronabinol Other (See Comments)     Mental changes/hallucinations    Haldol [haloperidol lactate] Other (See Comments)     Seizure like activity    Nsaids (non-steroidal anti-inflammatory drug)      Cannot have due to lung transplant    Adhesive Rash     Cloth tape- please use tegaderm or paper tape    Aztreonam Rash    Ciprofloxacin Nausea And Vomiting     Projectile N/V, per patient.  Unwilling to retry therapy.       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     Past Surgical History:   Procedure Laterality Date    ABDOMINAL SURGERY      peg tube     [id number] N/A 5/19/2015    Performed by [facility]y [facility]as [facility] OR    BIOPSY-BRONCHUS N/A 12/20/2016    [facility]formed by [facility] at [facility] 2ND FLR    Bronchoscopy N/A 7/5/2019    Performed by Francisca Morin DO at [facility]    BRONCHOSCOPY  Bilateral 12/11/2018    Performed by [facility] at [facility] OR 2ND FLR    BRONCHOSCOPY N/A 10/1/2018    Performed by Jake Alvarez MD at [facility] OR [facility]ND [facility]    BRONCHOSCOPY Bilateral 12/20/2016    Performed by Jake [facility]varez MD at [facility] OR [facility]ND [facility]    BRONCHOSCOPY - flexible bronchoscopy with probable tissue biopsy CPT 71061 N/A 7/21/2015    Performed by Jake Alvarez MD at [facility] OR 2ND [facility]    BRONCHOSCOPY - flexible bronchoscopy with probable tissue biospy CPT 71845 N/A 10/20/2015    Performed by Jake Alvarez MD at [facility] OR 2ND [facility]    BRONCHOSCOPY - flexible bronchoscopy with tissue biopsy CPT 55981 N/A 9/29/2015    Performed by Jake Alvarez MD at [facility] OR [facility]    BRONCHOSCOPY - flexible bronchoscopy with tissue biopsy CPT 98633 N/A 5/26/2015    Performed by Jake Alvarez MD at [facility] OR 1ST FLR    BRONCHOSCOPY flexible bronchoscopy with tissue biopsy N/A 9/8/2014    Performed by Jake Alvarez MD at [facility] OR 2ND FLR    BRONCHOSCOPY flexible with possible tissue biopsy N/A 8/8/2014    Performed by Jake Alvarez MD at [facility] OR 2ND [facility]LR    BRONCHOSCOPY, BAL, BIOPSIES N/A 3/20/2018    Performed by Jake Alvarez MD at [facility] OR 2ND FLR    BRONCHOSCOPY-FIBEROPTIC N/A 1/29/2016    Performed by Joann Cifuentes DO at [facility] OR 2ND FLR    BRONCHOSCOPY; Bronchoscopy with BAL and transbronchial biopsies under general anesthesia N/A 5/29/2018    Performed by Jake Alvarez MD at [facility] OR 2ND FLR    CHOLECYSTECTOMY  2016    CHOLECYSTECTOMY-LAPAROSCOPIC N/A 7/22/2016    Performed by Joshua Goldberg, MD at [facility] OR 2ND FLR    COLONOSCOPY N/A 5/3/2019    Performed by Juancho Rich MD at [facility] ENDO (2ND FLR)    ENDOMETRIAL ABLATION  2015    KJB    ETHMOIDECTOMY Bilateral 4/9/2019    Performed by Adin Burks III, MD at [facility] OR 2ND FLR    FESS      FESS Bilateral 10/21/2013    Performed by Jared Whatley MD at [facility] OR 2ND FLR    FESS, USING COMPUTER-ASSISTED  NAVIGATION N/A 4/9/2019    [facility]    FINE NEEDLE ASPIRATION (FNA)  of a cervical lymphadenopathy  1/29/2016    Performed by Joann Cifuentes DO at [facility] OR 2ND FLR    flex bronchoscopy with probable tissue biopsy N/A 7/31/2014    Performed by [facility] Provider at [facility] OR 2ND FLR    flexible bronchoscopy with tissue biopsy N/A 12/11/2014    Performed by Jake [facility]varez MD at [facility] OR [facility]LR    flexible bronchoscopy with tissue biopsy  CPT 46576 N/A 8/15/2019    Performed by Jake Alvarez MD at [facility] OR 2ND FLR    flexible bronchoscopy with tissue biopsy CPT 69162  N/A 7/26/2019    Performed by Jake [facility]varez MD at [facility] OR [facility]LR    HYSTERECTOMY  04/2017    [facility]    INJECTION-VOCAL CORD Left 6/24/2014    [facility]    [id number]-KVLF-I-CATH to right chest and removal of portacath to left chests wall. Bilateral 6/24/2014    Performed by Zhen Dorado MD at [facility] OR 2ND FLR    LARYNX SURGERY  2016    LUNG TRANSPLANT Bilateral 6/12/14    MAXILLARY ANTROSTOMY  4/9/2019    Performed by Adin Burks III, MD at [facility] OR [address] [place] [address]    MYRINGOTOMY W/ TUBES Right 04/2017    MYRINGOTOMY WITH INSERTION OF PE TUBES Right 4/15/2017    [facility]    PLACEMENT-TUBE-PEG  5/15/2014    [facility]    PORTACATH PLACEMENT Right     rt sc    REDO BILATERAL LUNG TRANSPLANT; CLAMSHELL Bilateral 6/18/2019    [facility]    REMOVAL-TUBE-PEG  5/15/2014    Performed by David Moses MD at [facility] OR 2ND FLR    RHC for lung re-transplant N/A 1/22/2019    Performed by Laura Rachel MD at [facility] CATH LAB    ROBOTIC ASSISTED LAPAROSCOPIC HYSTERECTOMY N/A 4/25/2017    Performed by [facility]y [facility] Jr[facility] MD at [facility] OR    SALPINGECTOMY Bilateral 2015    KJB    SALPINGECTOMY-LAPAROSCOPIC  Bilateral 5/19/2015    Per[facility] by [facility] at [facility] OR    SINUS SURGERY FUNCTIONAL ENDOSCOPIC WITH NAVIGATION Bilateral 4/3/2017    Performed by Cesar Olsen MD at [facility] OR [address] [place] [address]    SINUS SURGERY FUNCTIONAL ENDOSCOPIC WITH NAVIGATION, 43808, 00260, 65694, 84559 Bilateral 2/5/2015    [facility]    SPHENOIDECTOMY Bilateral 4/9/2019    Performed by Adin Burks III, MD at [facility] OR [address] [place] [address]    THYROPLASTY - Medialization laryngoplasty, cricothyroid subluxation, arytenoid repositioning Left 8/2/2016    Performed by Gary Null MD at [facility] OR [address] [place] [address]    TRANSPLANT-LUNG Bilateral 6/11/2014    Performed by Adolfo Huston MD at [facility] OR [address] [place] [address]     Family History     Problem Relation (Age of Onset)    Cancer Maternal Grandmother    Diabetes Maternal Grandfather    Drug abuse Maternal Grandfather    Hypertension Maternal Grandmother    Thrombocytopenia Maternal Grandfather        Tobacco Use    Smoking status: Never Smoker    Smokeless tobacco: Never Used   Substance and Sexual Activity    Alcohol use: No     Comment: Has not had an alcoholic drink in more than 2 months.    Drug use: No    Sexual activity: Yes     Partners: Male     Birth control/protection: See Surgical Hx     Comment: current partner since Oct 2016     Review of Systems   Unable to perform ROS: Intubated     Objective:     Vital Signs (Most Recent):  Temp: 98.5 °F (36.9 °C) (08/28/19 1100)  Pulse: 104 (08/28/19 1200)  Resp: (!) 24 (08/28/19 0731)  BP: 110/82 (08/28/19 1200)  SpO2: 99 % (08/28/19 1200) Vital Signs (24h Range):  Temp:  [98 °F (36.7 °C)-98.6 °F (37 °C)] 98.5 °F (36.9 °C)  Pulse:  [] 104  Resp:  [24-25] 24  SpO2:  [96 %-100 %] 99 %  BP: ()/(56-82) 110/82  Arterial Line BP: ()/(47-79) 123/65     Weight: 51.9 kg (114 lb 6.7 oz)  Body mass index is 22.35 kg/m².    Physical Exam   Constitutional: She appears well-developed.   HENT:   Head:  Normocephalic and atraumatic.   Cardiovascular: Normal rate.   Wound vac in place to sternal incision.   Pulmonary/Chest:   Vent Mode: Spont  Oxygen Concentration (%):  (38-41) 41  Resp Rate Total:  (12 br/min-31 br/min) 12 br/min  PEEP/CPAP:  (5 cmH20) 5 cmH20  Pressure Support:  (14 cmH20) 14 cmH20  Mean Airway Pressure:  (7.4 swO78-35 cmH20) 8.1 cmH20     Abdominal: Soft. She exhibits no distension.   Neurological: She is alert.   Following commands   Skin: Skin is warm and dry.   Port in place in R chest   Nursing note and vitals reviewed.      Significant Labs:  CBC:   Recent Labs   Lab 08/28/19  0415   WBC 4.67   RBC 2.56*   HGB 8.1*   HCT 26.2*   *   *   MCH 31.6*   MCHC 30.9*     CMP:   Recent Labs   Lab 08/28/19 0415   *   CALCIUM 8.8   ALBUMIN 2.5*  2.5*   PROT 8.6*      K 4.9   CO2 21*      BUN 4*   CREATININE 0.6   ALKPHOS 173*   ALT 10   AST 15   BILITOT 0.4       Significant Diagnostics:  none new

## 2019-08-28 NOTE — PROGRESS NOTES
[facility]  Lung Transplant  Progress Note - Critical Care    Patient Name: Juanita Ibarra  MRN: [id number]  Admission Date: 8/26/2019  Hospital Length of Stay: 2 days  Post-Operative Day: 1903 (Lung), 71 (Lung)  Attending Physician: Jake Alvarez MD  Primary Care Provider: Primary Doctor No     Subjective:     Interval History: No acute events overnight. Remains oliguric and switched to PSV  ventilation. Tolerating CRRT. Continues to require low dose vasopressin and levophed. Now with staph bacteremia and port removed at the bedside.     Continuous Infusions:   sodium chloride 0.9% 200 mL/hr at 08/28/19 1800    dexmedetomidine (PRECEDEX) infusion 1.4 mcg/kg/hr (08/28/19 1817)    fentanyl 17.5 mL/hr at 08/28/19 1800    insulin (HUMAN R) infusion (adults) 0.8 Units/hr (08/28/19 1800)    norepinephrine bitartrate-D5W Stopped (08/28/19 1500)    propofol 50 mcg/kg/min (08/28/19 1800)    vasopressin (PITRESSIN) infusion 0.04 Units/min (08/28/19 1800)     Scheduled Meds:   ceftolozane-tazobactam  1,500 mg Intravenous Q8H    chlorhexidine  15 mL Mouth/Throat BID    heparin (porcine)  5,000 Units Subcutaneous Q12H    levalbuterol  1.25 mg Nebulization Q8H    micafungin (MYCAMINE) IVPB  100 mg Intravenous Q24H    predniSONE  15 mg Per OG tube Daily    QUEtiapine  25 mg Per OG tube Daily    QUEtiapine  50 mg Per OG tube QHS    sodium chloride 0.9%  1,000 mL Intravenous Once    topiramate  25 mg Per OG tube BID    voriconazole  200 mg Per OG tube BID     PRN Meds:Dextrose 10% Bolus, Dextrose 10% Bolus, diphenhydrAMINE, LORazepam, magnesium sulfate IVPB, ondansetron, sodium phosphate IVPB, sodium phosphate IVPB, sodium phosphate IVPB    Review of patient's allergies indicates:   Allergen Reactions    Albuterol Palpitations    Colistin Anaphylaxis    Vancomycin analogues      Infusion reaction that does not resolve with slowing  Pt. States she can tolerate it when given with  50 mg Benadryl and ran over 3 hours    Neupogen [filgrastim] Other (See Comments)     Ostealgia after five daily doses of 300 mcg.      Bactrim [sulfamethoxazole-trimethoprim] Hives    Ceftazidime Hives     Pt stated can tolerate cefapine not ceftazidime    Ceftazidime     Dronabinol Other (See Comments)     Mental changes/hallucinations    Haldol [haloperidol lactate] Other (See Comments)     Seizure like activity    Nsaids (non-steroidal anti-inflammatory drug)      Cannot have due to lung transplant    Adhesive Rash     Cloth tape- please use tegaderm or paper tape    Aztreonam Rash    Ciprofloxacin Nausea And Vomiting     Projectile N/V, per patient.  Unwilling to retry therapy.       Review of Systems   Unable to perform ROS: Intubated     Objective:   Physical Exam   Constitutional: She appears well-developed and well-nourished. She has a sickly appearance. No distress. She is intubated.   HENT:   Head: Normocephalic.   Nose: Nose normal.   Eyes: Conjunctivae are normal. No scleral icterus.   Neck: Normal range of motion. Neck supple. No JVD present. No tracheal deviation present.   Cardiovascular: Regular rhythm and normal heart sounds. Tachycardia present. Exam reveals no gallop and no friction rub.   No murmur heard.  Pulmonary/Chest: She is intubated. No respiratory distress. She has decreased breath sounds in the right lower field. She has no wheezes. She has rhonchi in the right upper field, the right middle field, the left upper field and the left middle field. She has no rales.   Mechanical BS bilaterally   Abdominal: Soft. Bowel sounds are normal. She exhibits no distension. There is no tenderness.   Genitourinary:   Genitourinary Comments: saleh   Musculoskeletal: Normal range of motion. She exhibits no edema, tenderness or deformity.   Neurological:   Follows commands and moves all 4 extremities   Skin: Skin is warm and dry. No erythema. There is pallor.   Nursing note and vitals  reviewed.        Vital Signs (Most Recent):  Temp: 98.5 °F (36.9 °C) (08/28/19 1100)  Pulse: 73 (08/28/19 1815)  Resp: 15 (08/28/19 1631)  BP: 116/76 (08/28/19 1800)  SpO2: 100 % (08/28/19 1815) Vital Signs (24h Range):  Temp:  [98.2 °F (36.8 °C)-98.6 °F (37 °C)] 98.5 °F (36.9 °C)  Pulse:  [] 73  Resp:  [15-24] 15  SpO2:  [96 %-100 %] 100 %  BP: ()/(56-92) 116/76  Arterial Line BP: ()/(47-83) 109/63     Weight: 51.9 kg (114 lb 6.7 oz)  Body mass index is 22.35 kg/m².      Intake/Output Summary (Last 24 hours) at 8/28/2019 1831  Last data filed at 8/28/2019 1800  Gross per 24 hour   Intake 7000.63 ml   Output 9434 ml   Net -2433.37 ml       Ventilator Data:     Vent Mode: A/C  Oxygen Concentration (%):  [38-43] 39  Resp Rate Total:  [8.8 br/min-31 br/min] 24 br/min  Vt Set:  [375 mL] 375 mL  PEEP/CPAP:  [5 cmH20] 5 cmH20  Pressure Support:  [14 cmH20] 14 cmH20  Mean Airway Pressure:  [7.4 woP35-08 cmH20] 11 cmH20    Hemodynamic Parameters:       Lines/Drains:       Hemodialysis Catheter 08/26/19 2225 right femoral (Active)   Site Assessment Clean;Dry;Intact 8/27/2019  3:00 AM   Status Accessed 8/27/2019  3:00 AM   Flows Good 8/27/2019  3:00 AM   Dressing Intervention New dressing 8/26/2019 10:30 PM   Dressing Status Biopatch in place;Clean;Dry;Intact 8/27/2019  3:00 AM   Dressing Change Due 08/30/19 8/27/2019  3:00 AM   Verification by X-ray Other (Comment) 8/26/2019 10:30 PM   Dressing Occlusive 8/27/2019  3:00 AM   Daily Line Review Performed 8/27/2019  3:00 AM   Number of days: 0            Port A Cath Single Lumen 08/26/19 right subclavian (Active)   Dressing Type Transparent 8/27/2019  3:00 AM   Dressing Status Clean;Dry;Intact 8/27/2019  3:00 AM   Dressing Intervention Dressing reinforced 8/26/2019  3:00 PM   Dressing Change Due 09/02/19 8/27/2019  3:00 AM   Line Status Infusing 8/27/2019  3:00 AM   Daily Line Review Performed 8/27/2019  3:00 AM   Number of days: 1            Percutaneous  Central Line Insertion/Assessment - triple lumen  08/26/19 1135 left internal jugular (Active)   Dressing biopatch in place;dressing dry and intact 8/27/2019  3:00 AM   Securement secured w/ sutures 8/27/2019  3:00 AM   Additional Site Signs no erythema;no warmth;no edema;no pain;no palpable cord;no streak formation;no drainage 8/27/2019  3:00 AM   Distal Patency/Care infusing 8/27/2019  3:00 AM   Medial Patency/Care infusing 8/27/2019  3:00 AM   Proximal Patency/Care infusing 8/27/2019  3:00 AM   Waveform normal 8/27/2019  3:00 AM   Line Interventions line leveled/zeroed 8/27/2019  3:00 AM   Dressing Change Due 09/02/19 8/27/2019  3:00 AM   Daily Line Review Performed 8/27/2019  3:00 AM   Number of days: 0            Peripheral IV - Single Lumen 08/26/19 22 G Right Hand (Active)   Site Assessment Clean;Dry;Intact 8/27/2019  3:00 AM   Line Status Infusing 8/27/2019  3:00 AM   Dressing Status Clean;Dry;Intact 8/27/2019  3:00 AM   Dressing Intervention Dressing reinforced 8/26/2019  3:00 PM   Dressing Change Due 08/30/19 8/27/2019  3:00 AM   Site Change Due 08/30/19 8/26/2019  7:00 PM   Reason Not Rotated Not due 8/27/2019  3:00 AM   Number of days: 1            Peripheral IV - Single Lumen 08/26/19 1119 22 G Left;Posterior Hand (Active)   Site Assessment Clean;Dry;Intact 8/27/2019  3:00 AM   Line Status Infusing 8/27/2019  3:00 AM   Dressing Status Clean;Dry;Intact 8/27/2019  3:00 AM   Dressing Intervention Dressing reinforced 8/26/2019  3:00 PM   Dressing Change Due 08/30/19 8/27/2019  3:00 AM   Site Change Due 08/30/19 8/26/2019  7:00 PM   Reason Not Rotated Not due 8/27/2019  3:00 AM   Number of days: 0            Peripheral IV - Single Lumen 08/26/19 1119 20 G Anterior;Right Forearm (Active)   Site Assessment Clean;Dry;Intact 8/27/2019  3:00 AM   Line Status Infusing 8/27/2019  3:00 AM   Dressing Status Clean;Dry;Intact 8/27/2019  3:00 AM   Dressing Intervention Dressing reinforced 8/26/2019  3:00 PM   Dressing  "Change Due 08/30/19 8/27/2019  3:00 AM   Site Change Due 08/30/19 8/26/2019  7:00 PM   Reason Not Rotated Not due 8/27/2019  3:00 AM   Number of days: 0            Arterial Line 08/26/19 1700 Right Femoral (Active)   Site Assessment Clean;Dry;Intact 8/27/2019  3:00 AM   Line Status Pulsatile blood flow 8/27/2019  3:00 AM   Art Line Waveform Appropriate 8/27/2019  3:00 AM   Arterial Line Interventions Zeroed and calibrated;Leveled;Connections checked and tightened;Flushed per protocol;Line pulled back 8/27/2019  3:00 AM   Color/Movement/Sensation Capillary refill less than 3 sec 8/27/2019  3:00 AM   Dressing Type Transparent 8/27/2019  3:00 AM   Dressing Status Clean;Dry;Intact 8/27/2019  3:00 AM   Dressing Change Due 08/30/19 8/27/2019  3:00 AM   Number of days: 0            NG/OG Tube 08/26/19 1140 orogastric 18 Fr. Center mouth (Active)   Placement Check placement verified by x-ray 8/27/2019  3:00 AM   Tolerance signs/symptoms of discomfort 8/27/2019  3:00 AM   Securement secured to commercial device 8/27/2019  3:00 AM   Clamp Status/Tolerance unclamped 8/27/2019  3:00 AM   Suction Setting/Drainage Method suction at;low;moderate 8/27/2019  3:00 AM   Insertion Site Appearance no redness, warmth, tenderness, skin breakdown, drainage 8/27/2019  3:00 AM   Drainage Green;Brown 8/27/2019  3:00 AM   Intake (mL) 60 mL 8/27/2019  9:00 AM   Number of days: 0            Urethral Catheter 08/26/19 1230 (Active)   Site Assessment Clean;Intact 8/27/2019  3:00 AM   Collection Container Urimeter 8/27/2019  3:00 AM   Securement Method secured to top of thigh w/ adhesive device 8/27/2019  3:00 AM   Catheter Care Performed no 8/27/2019  3:00 AM   Reason for Continuing Urinary Catheterization Critically ill in ICU requiring intensive monitoring 8/27/2019  3:00 AM   CAUTI Prevention Bundle StatLock in place w 1" slack;Intact seal between catheter & drainage tubing;Drainage bag off the floor;No dependent loops or kinks;Green sheeting " clip in use;Drainage bag not overfilled (<2/3 full);Drainage bag below bladder 8/26/2019 11:00 PM   Output (mL) 10 mL 8/27/2019  9:00 AM   Number of days: 0       Significant Labs:  CBC:  Recent Labs   Lab 08/28/19  0415   WBC 4.67   RBC 2.56*   HGB 8.1*   HCT 26.2*   *   *   MCH 31.6*   MCHC 30.9*     BMP:  Recent Labs   Lab 08/28/19  1425      K 4.9      CO2 17*   BUN 4*   CREATININE 0.5   CALCIUM 8.5*      Tacrolimus Levels:  Recent Labs   Lab 08/28/19  0415   TACROLIMUS 6.3     Microbiology:  Microbiology Results (last 7 days)     Procedure Component Value Units Date/Time    Blood culture [[id number]] Collected:  08/28/19 1020    Order Status:  Completed Specimen:  Blood from Peripheral, Wrist, Right Updated:  08/28/19 1715     Blood Culture, Routine No Growth to date    Blood culture [[id number]] Collected:  08/28/19 1250    Order Status:  Sent Specimen:  Blood from Peripheral, Hand, Right Updated:  08/28/19 1300    Culture, Respiratory [[id number]]  (Abnormal)  (Susceptibility) Collected:  08/26/19 1441    Order Status:  Completed Specimen:  Respiratory from Bronchial Wash Updated:  08/28/19 1213     Respiratory Culture METHICILLIN RESISTANT STAPHYLOCOCCUS AUREUS  Many       Gram Stain (Respiratory) <10 epithelial cells per low power field.     Gram Stain (Respiratory) Few WBC's     Gram Stain (Respiratory) Many Gram positive cocci    Narrative:       Bronchial Wash    Fungus culture [[id number]]  (Abnormal) Collected:  08/26/19 1441    Order Status:  Completed Specimen:  Respiratory from Bronchial Wash Updated:  08/28/19 1101     Fungus (Mycology) Culture YEAST   Many  Identification pending      Narrative:       Bronchial Wash    Blood culture #1 **CANNOT BE ORDERED STAT** [[id number]]  (Abnormal) Collected:  08/26/19 0848    Order Status:  Completed Specimen:  Blood from Line, Port A Cath Updated:  08/28/19 1031     Blood Culture, Routine Gram stain lisa bottle: Gram positive cocci in  clusters resembling Staph       Results called to and read back by:Monet Martinez RN 08/27/2019  06:04      Gram stain aer bottle: Gram positive cocci in clusters resembling Staph       08/28/2019  10:29      STAPHYLOCOCCUS AUREUS  ID consult required at [facility] and [facility]   locations.  For susceptibility see order #[id number]      Blood culture #2 **CANNOT BE ORDERED STAT** [[id number]]  (Abnormal) Collected:  08/26/19 0859    Order Status:  Completed Specimen:  Blood from Wrist, Right Updated:  08/28/19 0931     Blood Culture, Routine Gram stain aer bottle: Gram positive cocci in clusters resembling Staph       Results called to and read back by:Monet Maritnez RN 08/27/2019 03:23      Gram stain lisa bottle: Gram positive cocci in clusters resembling Staph       08/27/2019  11:38      STAPHYLOCOCCUS AUREUS  Susceptibility pending  ID consult required at [facility] and [facility].      AFB Culture & Smear [[id number]] Collected:  08/26/19 1441    Order Status:  Completed Specimen:  Respiratory from Bronchial Wash Updated:  08/27/19 2127     AFB Culture & Smear Culture in progress     AFB CULTURE STAIN No acid fast bacilli seen.    Narrative:       Bronchial Wash    Respiratory Infection Panel, Nasopharyngeal [[id number]] Collected:  08/26/19 1003    Order Status:  Completed Specimen:  Nasopharyngeal Swab Updated:  08/26/19 1344     Respiratory Infection Panel Source NP Swab     Adenovirus Not Detected     Coronavirus 229E Not Detected     Coronavirus HKU1 Not Detected     Coronavirus NL63 Not Detected     Coronavirus OC43 Not Detected     Human Metapneumovirus Not Detected     Human Rhinovirus/Enterovirus Not Detected     Influenza A (subtypes H1, H1-2009,H3) Not Detected     Influenza B Not Detected     Parainfluenza Virus 1 Not Detected     Parainfluenza Virus 2 Not Detected     Parainfluenza Virus 3 Not Detected     Parainfluenza Virus 4 Not Detected     Respiratory  Syncytial Virus Not Detected     Bordetella Parapertussis (JD4922) Not Detected     Bordetella pertussis (ptxP) Not Detected     Chlamydia pneumoniae Not Detected     Mycoplasma pneumoniae Not Detected    Narrative:       For all other respiratory sources order [id number] Respiratory  Viral Panel by PCR (RVPCR)    Culture, Respiratory  - Cystic Fibrosis [[id number]]     Order Status:  Canceled Specimen:  Respiratory           I have reviewed all pertinent labs within the past 24 hours.    Diagnostic Results:  Chest X-Ray: I personally reviewed the films and findings are:, bilateral opacities with improved aeration as compared to previous CXR. Right pleural effusion.     No Further        Assessment/Plan:     * Septic shock  Multifactorial given staph bacteremia and PNA. History of recurrent  pseudomonal infections and MRSA. Continue empiric Zerbaxa and Vancomycin. Maintain MAPs >65. Will follow up on cultures and adjust therapy as needed.     S/P lung transplant  Underwent bilateral re-transplant for CARLOS EDUARDO on 6/18/19. Continue immunosuppression and prophylaxis.     Immunosuppression  Continue prednisone. Will hold tacrolimus in setting of LISBETH. Daily tacrolimus levels.     Prophylactic antibiotic  Continue valcyte, dapsone, and voriconazole.     Chronic pain with opiate use  Will hold chronic pain meds while intubated and sedated.     CF related Pancreatic insufficiency  Hold creon while NPO. Will start Viokace if started on tube feeds.     LISBETH (acute kidney injury)  Started on CRRT for metabolic acidosis and oliguric LISBETH. Will continue to hold nephrotoxic meds. Nephrology following appreciate recs.     Acute respiratory failure  Continue PS ventilation at current settings. Will follow up on BAL from 8/26 bronchoscopy. Daily ABG and CXR.     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    Bacteremia  Blood cultures from 8/26 with staph. Right chest port removed today. Will follow  up on repeat cultures. ID following, appreciate recs.         Preventive Measures: Nutrition: Goal: NPO, Stress Ulcer: continue prophyllaxis, DVT: continue prophyllaxis, Head of Bet: elevated, Reposition: per unit routine    Counseling/Consultation:I discussed the case with Dr. Alvarez.      XIMENA RajputC  Lung Transplant  [facility]

## 2019-08-28 NOTE — CONSULTS
[facility]  General Surgery  Consult Note    Patient Name: Juanita Ibarra  MRN: [id number]  Code Status: Full Code  Admission Date: 8/26/2019  Hospital Length of Stay: 2 days  Attending Physician: Jake Alvarez MD  Primary Care Provider: Primary Doctor No    Patient information was obtained from spouse/SO and past medical records.     Inpatient consult to General Surgery  Consult performed by: Jacqueline Douglass MD  Consult ordered by: Shama Canales PA-C  Reason for consult: Port removal        Subjective:     Principal Problem: Septic shock    History of Present Illness: Ms Ibarra is a 28yo female w/hx of cystic fibrosis s/p redo bilateral lung transplant (initial tx 2014, redo 6/18/19) who had a port placed by Dr. Dorado in 2014 for recurrent infections. General surgery was consulted for port removal.    Patient has had a complicated course following lung transplant including respiratory failure requiring intubation, renal failure requiring CRRT and now with staph bacteremia (blood cultures positive for staph aureus on 8/26). Primary team requesting port removal due to bacteremia. Patient only on subcu heparin for DVT prophylaxis. Platelets 117 this morning. On low dose levophed .01, vaso while on CRRT.    No current facility-administered medications on file prior to encounter.      Current Outpatient Medications on File Prior to Encounter   Medication Sig    blood sugar diagnostic Strp Preferred by insurance. Testing BG 4 times daily.    blood-glucose meter Misc Use to check blood glucose 4 (four) times daily.    calcium-vitamin D3 (OS-KATY 500 + D3) 500 mg(1,250mg) -200 unit per tablet Take 1 tablet by mouth 2 (two) times daily with meals.    dapsone 100 MG Tab Take 1 tablet (100 mg total) by mouth once daily.    diphenhydrAMINE (BENADRYL) 50 MG tablet Take 1 tablet (50 mg total) by mouth every 6 (six) hours as needed for Itching.    fexofenadine (ALLEGRA) 180 MG  tablet Take 180 mg by mouth once daily.    fluticasone propionate (FLONASE) 50 mcg/actuation nasal spray INSERT 2 SPRAYS IN EACH NOSTRIL DAILY    folic acid (FOLVITE) 1 MG tablet Take 1 tablet (1,000 mcg total) by mouth once daily.    gabapentin (NEURONTIN) 300 MG capsule Take 1 capsule (300 mg total) by mouth 3 (three) times daily.    hydrocortisone 1 % cream Apply topically 2 (two) times daily.    inhalation spacing device (PROCHAMBER) USE AS DIRECTED    insulin aspart U-100 (NOVOLOG) 100 unit/mL (3 mL) InPn pen Inject 1-10 Units into the skin before meals and at bedtime as needed (Hyperglycemia).    lancets 33 gauge Misc Use to check blood glucose four times daily    levalbuterol (XOPENEX) 1.25 mg/3 mL nebulizer solution Take 3 mLs (1.25 mg total) by nebulization every 8 (eight) hours as needed for Wheezing or Shortness of Breath. Rescue    lipase-protease-amylase (CREON) 36,000-114,000- 180,000 unit CpDR Take 4 capsules by mouth 3 (three) times daily with meals. Take 3 with snacks as needed. (Patient taking differently: Take 5 capsules by mouth 3 (three) times daily with meals. )    LORazepam (ATIVAN) 1 MG tablet Take 1 tablet (1 mg total) by mouth every 8 (eight) hours as needed for Anxiety.    magnesium oxide (MAG-OX) 400 mg (241.3 mg magnesium) tablet Take 1 tablet (400 mg total) by mouth 3 (three) times daily.    metoprolol tartrate (LOPRESSOR) 25 MG tablet Take 1 tablet (25 mg total) by mouth 2 (two) times daily.    montelukast (SINGULAIR) 10 mg tablet Take 1 tablet (10 mg total) by mouth once daily.    morphine (MS CONTIN) 15 MG 12 hr tablet Take 1 tablet by mouth twice daily    multivit,min52-folic-vitK-cQ10 (AQUADEKS) 100-700-10 mcg-mcg-mg Cap cap 1 tab twice daily    omeprazole (PRILOSEC) 40 MG capsule Take 1 capsule (40 mg total) by mouth once daily.    ondansetron (ZOFRAN) 8 MG tablet Take 1 tablet (8 mg total) by mouth every 12 (twelve) hours as needed for Nausea.    oxyCODONE  "(ROXICODONE) 5 MG immediate release tablet take 1 tablet by mouth four times a day (Patient taking differently: 10 mg every 6 (six) hours as needed. )    pen needle, diabetic 32 gauge x 5/32" Ndle Injecting 3 times a day    polyethylene glycol (GLYCOLAX) 17 gram PwPk Take 17 g by mouth 2 (two) times daily.    predniSONE (DELTASONE) 5 MG tablet Take 3 tablets (15 mg total) by mouth once daily.    QUEtiapine (SEROQUEL) 25 MG Tab Take 1 tablet by mouth in morning and 2 tablets at bedtime    SITagliptin (JANUVIA) 100 MG Tab Take 1 tablet (100 mg total) by mouth once daily.    tacrolimus (PROGRAF) 0.5 MG Cap Take 1 capsule (0.5 mg total) by mouth every morning. Total daily dose is 1.5 mg every morning and 1 mg every evening.    tacrolimus (PROGRAF) 1 MG Cap Take 1 capsule (1 mg total) by mouth every 12 (twelve) hours.    tiZANidine (ZANAFLEX) 4 MG tablet Take 1 tablet (4 mg total) by mouth 2 (two) times daily.    topiramate (TOPAMAX) 25 MG tablet Take 1 tablet (25 mg total) by mouth 2 (two) times daily    valGANciclovir (VALCYTE) 450 mg Tab Take 1 tablet (450 mg total) by mouth 2 (two) times daily.    venlafaxine (EFFEXOR-XR) 150 MG Cp24 Take 1 capsule (150 mg total) by mouth once daily.    venlafaxine (EFFEXOR-XR) 37.5 MG 24 hr capsule Take 1 capsule (37.5 mg total) by mouth once daily.    voriconazole (VFEND) 200 MG Tab Take 1 tablet (200 mg total) by mouth 2 (two) times daily.       Review of patient's allergies indicates:   Allergen Reactions    Albuterol Palpitations    Colistin Anaphylaxis    Vancomycin analogues      Infusion reaction that does not resolve with slowing  Pt. States she can tolerate it when given with 50 mg Benadryl and ran over 3 hours    Neupogen [filgrastim] Other (See Comments)     Ostealgia after five daily doses of 300 mcg.      Bactrim [sulfamethoxazole-trimethoprim] Hives    Ceftazidime Hives     Pt stated can tolerate cefapine not ceftazidime    Ceftazidime     " Dronabinol Other (See Comments)     Mental changes/hallucinations    Haldol [haloperidol lactate] Other (See Comments)     Seizure like activity    Nsaids (non-steroidal anti-inflammatory drug)      Cannot have due to lung transplant    Adhesive Rash     Cloth tape- please use tegaderm or paper tape    Aztreonam Rash    Ciprofloxacin Nausea And Vomiting     Projectile N/V, per patient.  Unwilling to retry therapy.       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     Past Surgical History:   Procedure Laterality Date    ABDOMINAL SURGERY      peg tube     IFKAEOLY-MKONUOLDOGD-ZMOROBLDLIPE N/A 5/19/2015    Per[facility] OR    BIOPSY-BRONCHUS N/A 12/20/2016    Performed by Jake Alvarez MD at [facility] OR 2ND [facility]    Bronchoscopy N/A 7/5/2019    Performed by Francisca Morin DO at [facility] OR [facility]ND [facility]    BRONCHOSCOPY Bilateral 12/11/2018    Performed by Francisca Morin DO at [facility] OR 2ND [facility]    BRONCHOSCOPY N/A 10/1/2018    Performed by Jake Alvarez MD at [facility] OR [facility]    BRONCHOSCOPY Bilateral 12/20/2016    Performed by Jake Alvarez MD at [facility] OR [facility]ND [facility]    BRONCHOSCOPY - flexible bronchoscopy with probable tissue biopsy CPT 88492 N/A 7/21/2015    Performed by Jake Alvarez MD at [facility] OR [facility]ND [facility]    BRONCHOSCOPY - flexible bronchoscopy with probable tissue biospy CPT 48262 N/A 10/20/2015    Performed by Jake Alvarez MD at [facility] OR 2ND [facility]    BRONCHOSCOPY - flexible bronchoscopy with tissue biopsy CPT 71747 N/A 9/29/2015    Performed by  Jake [facility]varez MD at [facility] OR 2ND [facility]    BRONCHOSCOPY - flexible bronchoscopy with tissue biopsy CPT 05722 N/A 5/26/2015    Performed by Jake [facility]varez MD at [facility] OR 1ST FLR    BRONCHOSCOPY flexible bronchoscopy with tissue biopsy N/A 9/8/2014    Performed by Jake Alvarez MD at [facility] OR 2ND [facility]    BRONCHOSCOPY flexible with possible tissue biopsy N/A 8/8/2014    Performed by Jake Alvarez MD at [facility] OR 2ND [facility]    BRONCHOSCOPY, BAL, BIOPSIES N/A 3/20/2018    Performed by Jake Alvarez MD at [facility] OR 2ND [facility]LR    BRONCHOSCOPY-FIBEROPTIC N/A 1/29/2016    Performed by Joann Cifuentes DO at [facility] OR 2ND FLR    BRONCHOSCOPY; Bronchoscopy with BAL and transbronchial biopsies under general anesthesia N/A 5/29/2018    Performed by Jake Alvarez MD at [facility] OR 2ND FLR    CHOLECYSTECTOMY  2016    CHOLECYSTECTOMY-LAPAROSCOPIC N/A 7/22/2016    Performed by Joshua Goldberg, MD at [facility] OR 2ND FLR    COLONOSCOPY N/A 5/3/2019    Performed by Juancho Rich MD at [facility] ENDO (2ND FLR)    ENDOMETRIAL ABLATION  2015    [facility]    ETHMOIDECTOMY Bilateral 4/9/2019    Performed by Adin Burks III, MD at [facility] OR 2ND FLR    FESS      FESS Bilateral 10/21/2013    Performed by Jared Whatley MD at [facility] OR 2ND FLR    FESS, USING COMPUTER-ASSISTED NAVIGATION N/A 4/9/2019    Performed by Adin Burks III, MD at [facility] OR 2ND FLR    FINE NEEDLE ASPIRATION (FNA)  of a cervical lymphadenopathy  1/29/2016    Performed by Joann Cifuentes DO at [facility] OR 2ND FLR    flex bronchoscopy with probable tissue biopsy N/A 7/31/2014    Performed by [facility] Provider at [facility] OR 2ND FLR    flexible bronchoscopy with tissue biopsy N/A 12/11/2014    Performed by Jake Alvarez MD at [facility] OR 2ND [facility]LR    flexible bronchoscopy with tissue biopsy  CPT 06859 N/A 8/15/2019    Performed by Jake Alvarez MD at [facility] OR 2ND FLR    flexible bronchoscopy with tissue biopsy CPT 15602  N/A 7/26/2019    Performed  by Jake [facility]varez MD at [facility] OR [facility]    HYSTERECTOMY  04/2017    [facility]    INJECTION-VOCAL CORD Left 6/24/2014    Performed by Jared Whatley MD at [facility] OR [address] [place] [address]    DJNNRZTNX-JORO-W-CATH to right chest and removal of portacath to left chests wall. Bilateral 6/24/2014    [facility]    LARYNX SURGERY  2016    LUNG TRANSPLANT Bilateral 6/12/14    MAXILLARY ANTROSTOMY  4/9/2019    Performed by [facility]    MYRINGOTOMY W/ TUBES Right 04/2017    MYRINGOTOMY WITH INSERTION OF PE TUBES Right 4/15/2017    Performed by Gary Null MD at [facility] OR [address] [place] [address]    PLACEMENT-TUBE-PEG  5/15/2014    Performed by David Moses MD at [facility] OR [address] [place] [address]    PORTACATH PLACEMENT Right     rt sc    REDO BILATERAL LUNG TRANSPLANT; CLAMSHELL Bilateral 6/18/2019    Performed by Jonathan Newby MD at [facility] OR [address] [place] [address]    REMOVAL-TUBE-PEG  5/15/2014    Performed by David Moses MD at [facility] OR [address] [place] [address]    RHC for lung re-transplant N/A 1/22/2019    Performed by Laura Rachel MD at [facility] CATH LAB    ROBOTIC ASSISTED LAPAROSCOPIC HYSTERECTOMY N/A 4/25/2017    Performed by Deny Dias Jr., MD at [facility] OR    SALPINGECTOMY Bilateral 2015    [facility]    SALPINGECTOMY-LAPAROSCOPIC Bilateral 5/19/2015    Performed by [facility]y Dias Jr., MD at [facility] OR    SINUS SURGERY FUNCTIONAL ENDOSCOPIC WITH NAVIGATION Bilateral 4/3/2017    Performed by Cesar Olsen MD at [facility] OR [address] [place] [address]    SINUS SURGERY FUNCTIONAL ENDOSCOPIC WITH NAVIGATION, 68944, 86856, 86118, 61932 Bilateral 2/5/2015    Performed by [facility]    SPHENOIDECTOMY Bilateral 4/9/2019    Performed by Adin Burks III, MD at [facility] OR [address] [place] [address]    THYROPLASTY - Medialization laryngoplasty, cricothyroid subluxation, arytenoid repositioning Left 8/2/2016    Performed by Gary Null MD at [facility]    TRANSPLANT-LUNG Bilateral 6/11/2014     Performed by Adolfo Huston MD at [facility] OR [address] [place] [address]     Family History     Problem Relation (Age of Onset)    Cancer Maternal Grandmother    Diabetes Maternal Grandfather    Drug abuse Maternal Grandfather    Hypertension Maternal Grandmother    Thrombocytopenia Maternal Grandfather        Tobacco Use    Smoking status: Never Smoker    Smokeless tobacco: Never Used   Substance and Sexual Activity    Alcohol use: No     Comment: Has not had an alcoholic drink in more than 2 months.    Drug use: No    Sexual activity: Yes     Partners: Male     Birth control/protection: See Surgical Hx     Comment: current partner since Oct 2016     Review of Systems   Unable to perform ROS: Intubated     Objective:     Vital Signs (Most Recent):  Temp: 98.5 °F (36.9 °C) (08/28/19 1100)  Pulse: 104 (08/28/19 1200)  Resp: (!) 24 (08/28/19 0731)  BP: 110/82 (08/28/19 1200)  SpO2: 99 % (08/28/19 1200) Vital Signs (24h Range):  Temp:  [98 °F (36.7 °C)-98.6 °F (37 °C)] 98.5 °F (36.9 °C)  Pulse:  [] 104  Resp:  [24-25] 24  SpO2:  [96 %-100 %] 99 %  BP: ()/(56-82) 110/82  Arterial Line BP: ()/(47-79) 123/65     Weight: 51.9 kg (114 lb 6.7 oz)  Body mass index is 22.35 kg/m².    Physical Exam   Constitutional: She appears well-developed.   HENT:   Head: Normocephalic and atraumatic.   Cardiovascular: Normal rate.   Wound vac in place to sternal incision.   Pulmonary/Chest:   Vent Mode: Spont  Oxygen Concentration (%):  (38-41) 41  Resp Rate Total:  (12 br/min-31 br/min) 12 br/min  PEEP/CPAP:  (5 cmH20) 5 cmH20  Pressure Support:  (14 cmH20) 14 cmH20  Mean Airway Pressure:  (7.4 apV16-95 cmH20) 8.1 cmH20     Abdominal: Soft. She exhibits no distension.   Neurological: She is alert.   Following commands   Skin: Skin is warm and dry.   Port in place in R chest   Nursing note and vitals reviewed.      Significant Labs:  CBC:   Recent Labs   Lab 08/28/19  0415   WBC 4.67   RBC 2.56*   HGB 8.1*   HCT 26.2*   *   MCV  102*   MCH 31.6*   MCHC 30.9*     CMP:   Recent Labs   Lab 08/28/19  0415   *   CALCIUM 8.8   ALBUMIN 2.5*  2.5*   PROT 8.6*      K 4.9   CO2 21*      BUN 4*   CREATININE 0.6   ALKPHOS 173*   ALT 10   AST 15   BILITOT 0.4       Significant Diagnostics:  none new    Assessment/Plan:     * Septic shock  30yo female w/cystic fibrosis s/p redo lung transplant (6/18/19) now with staph bacteremia who primary team is requesting port removal for infection risk    - port removal at bedside in the ICU, consent obtained from significant other at the bedside who is medical power of  (see procedure note below)  - ok to remove outer dressing in 2 days  - will sign off at this time, please call with questions      VTE Risk Mitigation (From admission, onward)        Ordered     heparin (porcine) injection 5,000 Units  Every 12 hours      08/27/19 1638     IP VTE HIGH RISK PATIENT  Once      08/26/19 0912          Thank you for your consult. I will sign off. Please contact us if you have any additional questions.    Jacqueline Douglass MD  General Surgery  [facility]      Procedure note:    DATE: 8/28/19    PREOPERATIVE DIAGNOSIS: sepsis, staph bacteremia    POSTOPERATIVE DIAGNOSIS: same    PROCEDURE PERFORMED: right subclavian venous Port-A-Cath removal    ATTENDING SURGEON: David Moses M.D.     HOUSESTAFF SURGEON: Jacqueline Douglass M.D. (RES)     ANESTHESIA: Local.     ESTIMATED BLOOD LOSS: 5 mL     FINDINGS: Dense adhesions to port and catheter     SPECIMEN: Catheter tip    DRAINS: None.     COMPLICATIONS: None.     INDICATIONS: Juanita Ibarra is a 29 y.o. female who had right subclavian Port-A-Cath placed for recurrent need for IV access given diagnosis of cystic fibrosis. General Surgery was consulted for port removal in the setting of bacteremia. We discussed port removal and the patient agreed to proceed. The patient's significant other who has medical power of   did sign informed consent and expressed understanding of the risks and benefits of surgery.     OPERATIVE PROCEDURE: The patient was identified in the ICU and the procedure was performed at bedside. The right chest was prepped and draped in the standard sterile surgical fashion. A timeout was performed and all team members present agreed this was the correct procedure on the correct patient. Pre-operative antibiotics were not indicated.    We began the procedure by administering local anesthetic to the desired incision site and the subcutaneous tissues around the port. A single straight incision was made in the previous scar. Sharp dissection was carried down until the port and catheter were identified. The capsule was entered. The catheter was isolated and clamped with a hemostat to secure control. The port was freed in all directions and no anchoring sutures were identified. There were dense adhesions to the port and catheter which were freed with some difficulty but were removed intact and in one piece. A figure-of-eight 3-0 Vicryl suture was placed to close the catheter tract. Hemostasis was obtained. The incision was closed in two layers with interrupted 3-0 Vicryl deep dermal interrupted sutures and a running 4-0 Monocryl subcuticular suture. The patient tolerated the procedure well.    Jacqueline Douglass MD  Pager: [phone]  General Surgery PGY-III  [facility]

## 2019-08-28 NOTE — PROGRESS NOTES
SW attempted to complete admit note, however pt still intubated, family not present. SW to attempt at another time/date.

## 2019-08-28 NOTE — PROGRESS NOTES
Admit Note     Met with spouse and mother to assess needs due pt being intubated. Patient is a 29 y.o. single female, admitted for Acute respiratory failure [J96.00]  Tachycardia [R00.0]  LRTI (lower respiratory tract infection) [J22]  Hypoxia [R09.02]  First lung txp preformed at [facility] on 6/12/14, second preformed on 6/18/2019    Patient admitted from home on 8/26/2019 .  At this time, patient presents as intubated.  At this time, patients caregiver presents as alert and oriented x 4, pleasant, good eye contact, well groomed, recall good, concentration/judgement good, average intelligence, calm, communicative, cooperative and asking and answering questions appropriately.     Household/Family Systems (reported by pt family)    Patient resides with patient's significant other, at [address] [place] [address].   Support system includes pt's mother Kim Ibarra, aunt Mariel Calero, and s/o Shawn Castrejon.  Patient does not have dependents that are need of being cared for.     Patients primary caregiver is Shawn Castrejon, patient's s/o, phone number [phone].  Confirmed patients contact information is [phone] (home);   Telephone Information:   Mobile [phone]     During admission, patient's caregiver plans to stay in patient's room.  Confirmed patient and patients caregivers do have access to reliable transportation.    Cognitive Status/Learning (reported by pt family)    Patient reports reading ability as 8th grade and states patient does have difficulty with short-term memory.  Patient reports patient learns best by visual/auditory/hands on information.   Needed: No.   Highest education level: Grade School (0-8)    Vocation/Disability (reported by pt family)    Working for Income: No  If no, reason not working: Disability  Patient is disabled due to Cystic Fibrosis since 2004. Pt was taking classes part-time at [employer] in [place] but dropped out due to illness. Pt  reports that she does not plan on returning.     Adherence (reported by pt family)    Patient family reports a high level of adherence to patients health care regimen.  Adherence counseling and education provided. Family verbalizes understanding.    Substance Use (reported by pt family)    Family reports the following substance usage.    Tobacco: none, patient denies any use.   Alcohol: none, patient denies any use.  Illicit Drugs/Non-prescribed Medications: none, patient denies any use. Pt sees pain management clinic for prescribed pain meds. Pt does have a documented high threshold with pain medications/barbiturates/benzos.   Patient states clear understanding of the potential impact of substance use.  Substance abstinence/cessation counseling, education and resources provided and reviewed.     Services Utilizing/ADLS (reported by pt family)    Infusion Service: Prior to admission, patient utilizing? no; in past with [facility] [phone]/ [facility] [phone]   Home Health: Prior to admission, patient utilizing? yes with [facility]   DME: Prior to admission, no in past, O2 through [facility] [place]  Pulmonary/Cardiac Rehab: Prior to admission, no Pt's insurance does not cover  Dialysis:  Prior to admission, no  Transplant Specialty Pharmacy:  Prior to admission, yes; [facility].     Prior to admission, patient was semi- independent with ADLS and was not driving.  Patient is not able to care for self at this time due to compromised medical condition (as documented in medical record) and physical weakness.  Patient indicates a willingness to care for self once medically cleared to do so.    Insurance/Medications(reported by pt family)    Insured by   Payor/Plan Subscr  Sex Relation Sub. Ins. ID Effective Group Num   1. [place]ICA[place]* ELOISE WILLIAM* 1990 Female  [id number] [address] [facility]                                   [address] [address]   2. [employer]* ELOISE WILLIAM* 1990 Female Self [id number]                                     [address] [address]       Primary Insurance (for UNOS reporting): Public Insurance - Medicaid-[facility]  Secondary Insurance (for UNOS reporting): None    Patient reports patient is able to obtain and afford medications at this time and at time of discharge.      Living Will/Healthcare Power of (reported by pt family)    Patient states patient has a LW and/or HCPA on file. Pt HCPA is s/o Shawn Castrejon ([phone]).      Coping/Mental Health(reported by pt family)    Patient has a well documented history mental health issues that are currently being managed with medications through an [facility]. SW unable to assess pt at this time due to intubation. Pt spouse and mother coping adequately with ongoing medical issues.     Discharge Planning    At time of discharge, patient plans to return to patient's home under the care of s/o.  Patients significant other will transport patient.  Per rounds today, expected discharge date has not been medically determined at this time. Patient and patients caregiver  verbalize understanding and are involved in treatment planning and discharge process.    Additional Concerns    Patient is being followed for needs, education, resources, information, emotional support, supportive counseling, and for supportive and skilled discharge plan of care.  providing ongoing psychosocial support, education, resources and d/c planning as needed.  SW remains available.  provided resource list, patient choice, psychosocial and supportive counseling, resources, education, assistance and discharge planning with patient and caregiver involvement, ongoing SW availability and services as appropriate.

## 2019-08-28 NOTE — ASSESSMENT & PLAN NOTE
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 She is weaning from pressors on vancomycin, empiric zerbaxa, and ongoing voriconazole/micafungin. Notably U/S shows R jugular DVT adjacent to R SC port.    - would continue vancomycin (goal trough 15-20) for MRSA septicemia and pneumonia  - if BAL cx are negative for Pseudomonas, can stop zerbaxa tomorrow  - would continue voriconazole for prior invasive aspergillosis; await BAL antigen from recent bronch; repeat level  - may continue micafungin while awaiting yeast ID but likely commensal Candida that doesn't require additional coverage  - remainder of prophylaxis per protocol  - would remove port as now presumed infected given MRSA septicemia  - TTE negative -- consider SUZY while patient is still intubated  - contact precautions for MRSA

## 2019-08-28 NOTE — PROGRESS NOTES
[facility]  Endocrinology  Progress Note    Admit Date: 8/26/2019     Reason for Consult: Management of Steroid induced Hyperglycemia     Surgical Procedure and Date:   Bilateral re-transplant of Lung    Diabetes diagnosis year: No dx of dm. Steroid induced hyperglycemia secondary to lung transplant treatment.     Home Diabetes Medications:    Januvia 100 mg daily   Novolog 4 units TIDWM    How often checking glucose at home? Not checking  BG readings on regimen: Unknown  Hypoglycemia on the regimen?  No  Missed doses on regimen?  Yes    Diabetes Complications include:     Hyperglycemia    Complicating diabetes co morbidities:   Glucocorticoid use       HPI:   Patient is a 29 y.o. female with a diagnosis of   MsAditi Ibarra is a 30 yo female s/p bilateral re-transplant (6/18/19) who presented to the ED in acute respiratory distress 08/26/2019. Per patient's caregiver, patient was doing well since her hospital discharge, but reports fevers (tmax 101.5) x 2 days, cough with occasional thin sputum, and severe dyspnea requiring >5L oxygen over the weekend. Patient's caregiver also reports poor appetite and PO intake over the last week. Patient has previous history of steroid sensitivity during admission in regards to glycemic control. Endocrinology consulted to manage hyperglycemia during admssion.     Lab Results   Component Value Date    HGBA1C 4.7 01/09/2019       Interval HPI:   Overnight events:  BG is within goal on current IV intensive insulin infusion. Patient remains intubated as per chart review. OBED.    Eating:   NPO  Nausea: No  Hypoglycemia and intervention: No  Fever: No  TPN and/or TF: No  If yes, type of TF/TPN and rate: None    BP 90/63 (BP Location: Right arm, Patient Position: Lying)   Pulse 95   Temp 98.2 °F (36.8 °C) (Oral)   Resp (!) 24   Ht 5' (1.524 m)   Wt 51.9 kg (114 lb 6.7 oz)   LMP 10/02/2016 (LMP Unknown)   SpO2 100%   Breastfeeding? No   BMI 22.35 kg/m²       Labs Reviewed and Include    Recent Labs   Lab 08/28/19  0415   *   CALCIUM 8.8   ALBUMIN 2.5*  2.5*   PROT 8.6*      K 4.9   CO2 21*      BUN 4*   CREATININE 0.6   ALKPHOS 173*   ALT 10   AST 15   BILITOT 0.4     Lab Results   Component Value Date    WBC 4.67 08/28/2019    HGB 8.1 (L) 08/28/2019    HCT 26.2 (L) 08/28/2019     (H) 08/28/2019     (L) 08/28/2019     Recent Labs   Lab 08/26/19  0847   TSH 1.120     Lab Results   Component Value Date    HGBA1C 4.7 01/09/2019       Nutritional status:   Body mass index is 22.35 kg/m².  Lab Results   Component Value Date    ALBUMIN 2.5 (L) 08/28/2019    ALBUMIN 2.5 (L) 08/28/2019    ALBUMIN 2.5 (L) 08/27/2019     Lab Results   Component Value Date    PREALBUMIN 15 (L) 05/29/2014    PREALBUMIN 11 (L) 05/09/2014    PREALBUMIN 18 (L) 03/19/2014       Estimated Creatinine Clearance: 98.5 mL/min (based on SCr of 0.6 mg/dL).    Accu-Checks  Recent Labs     08/27/19  1420 08/27/19  1510 08/27/19  1601 08/27/19  1745 08/27/19 2012 08/27/19  2206 08/27/19  2327 08/28/19  0132 08/28/19  0414 08/28/19  0547   POCTGLUCOSE 177* 169* 157* 165* 120* 119* 133* 155* 161* 169*       Current Medications and/or Treatments Impacting Glycemic Control  Immunotherapy:    Immunosuppressants     None        Steroids:   Hormones (From admission, onward)    Start     Stop Route Frequency Ordered    08/27/19 0900  predniSONE tablet 15 mg      -- OG Daily 08/26/19 1106    08/26/19 1031  vasopressin (PITRESSIN) 0.2 Units/mL in dextrose 5 % 100 mL infusion      -- IV Continuous 08/26/19 1031        Pressors:    Autonomic Drugs (From admission, onward)    Start     Stop Route Frequency Ordered    08/26/19 1145  norepinephrine 4 mg in dextrose 5% 250 mL infusion (premix) (titrating)     Question Answer Comment   Titrate by: (in mcg/kg/min) 0.05    Titrate interval: (in minutes) 5    Titrate to maintain: (MAP or SBP) MAP    Greater than: (in mmHg) 65    Maximum dose:  (in mcg/kg/min) 3        -- IV Continuous 08/26/19 1042        Hyperglycemia/Diabetes Medications:   Antihyperglycemics (From admission, onward)    Start     Stop Route Frequency Ordered    08/26/19 1549  insulin regular (Humulin R) 100 Units in sodium chloride 0.9% 100 mL infusion     Question:  Insulin Rate Adjustment (DO NOT MODIFY ANSWER)  Answer:  [email or web address]    -- IV Continuous 08/26/19 1549    08/26/19 1515  insulin aspart U-100 (NovoLOG) 100 unit/mL (3 mL) pen     Note to Pharmacy:  Created by cabinet override    08/27 0329   08/26/19 1515          ASSESSMENT and PLAN    * Septic shock  Infection may elevate BG readings  Managed per primary team  Avoid hypoglycemia        Acute hyperglycemia  BG goal 140 - 180     Continue IV insulin infusion protocol  Requires intensive BG monitoring while on protocol (Incease to every 2 hours). Will switch to transition infusion if IV insulin infusion rate remains stable.     Discharge planning: TBD        LISBETH (acute kidney injury)  Caution with insulin stacking            Montrell Guardado NP  Endocrinology  [facility]

## 2019-08-28 NOTE — SUBJECTIVE & OBJECTIVE
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    Cystic fibrosis of the lung     Ear infection     Hypertension     Migraine headache     MRSA (methicillin resistant Staphylococcus aureus) carrier     Osteopenia     Other specified disease of pancreas     Pain disorder     Seizures 2013    Sinusitis, chronic     Vocal cord paralysis 06/2014    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       Past Surgical History:   Procedure Laterality Date    ABDOMINAL SURGERY      peg tube     [id number]-[id number] N/A 5/19/2015    Performed by Deny Dias Jr., MD at [facility] OR    BIOPSY-BRONCHUS N/A 12/20/2016    Performed by Jake Alvarez MD at [facility] OR 2ND FLR    Bronchoscopy N/A 7/5/2019    Performed by Francisca Morin DO at [facility] OR 2ND [facility]    BRONCHOSCOPY Bilateral 12/11/2018    Performed by Francisca Morin DO at [facility] OR 2ND [facility]    BRONCHOSCOPY N/A 10/1/2018    Performed by Jake Alvarez MD at [facility] OR 2ND [facility]    BRONCHOSCOPY Bilateral 12/20/2016    Performed by Jake Alvarez MD at [facility] OR 2ND [facility]    BRONCHOSCOPY - flexible bronchoscopy with probable tissue biopsy CPT 43431 N/A 7/21/2015    Performed by Jake Alvarez MD at [facility] OR 2ND [facility]    BRONCHOSCOPY - flexible bronchoscopy with probable tissue biospy CPT 68779 N/A 10/20/2015    Performed by Jake Alvarez MD at [facility] OR 2ND [facility]    BRONCHOSCOPY - flexible bronchoscopy with tissue biopsy CPT 68213 N/A 9/29/2015    Performed by Jake Alvarez MD at [facility] OR 2ND [facility]    BRONCHOSCOPY - flexible bronchoscopy with tissue biopsy CPT 80017 N/A 5/26/2015    Performed by Jake Alvarez MD at [facility] OR 1ST FLR    BRONCHOSCOPY flexible bronchoscopy with tissue biopsy N/A 9/8/2014    Performed by Jake Alvarez MD at [facility] OR 2ND [facility]LR    BRONCHOSCOPY flexible with possible tissue biopsy N/A 8/8/2014    Performed by aJke Alvarez MD at [facility] OR 2ND FLR     BRONCHOSCOPY, BAL, BIOPSIES N/A 3/20/2018    Performed by Jake Alvarez MD at [facility] OR 2ND [facility]LR    BRONCHOSCOPY-FIBEROPTIC N/A 1/29/2016    Performed by Joann Cifuentes DO at [facility] OR 2ND FLR    BRONCHOSCOPY; Bronchoscopy with BAL and transbronchial biopsies under general anesthesia N/A 5/29/2018    [facility]    CHOLECYSTECTOMY  2016    CHOLECYSTECTOMY-LAPAROSCOPIC N/A 7/22/2016    Performed by Joshua Goldberg, MD at [facility] OR 2ND FLR    COLONOSCOPY N/A 5/3/2019    Performed by Juancho Rich MD at [facility] ENDO (2ND FLR)    ENDOMETRIAL ABLATION  2015    KJB    ETHMOIDECTOMY Bilateral 4/9/2019    [facility]    FESS      FESS Bilateral 10/21/2013    [facility]    FESS, USING COMPUTER-ASSISTED NAVIGATION N/A 4/9/2019    Performed by Adin Burks III, MD at [facility] OR [address] [place] [address]    FINE NEEDLE ASPIRATION (FNA)  of a cervical lymphadenopathy  1/29/2016    Performed by oJann [facility]    flex bronchoscopy with probable tissue biopsy N/A 7/31/2014    [facility] OR 2ND FLR    flexible bronchoscopy with tissue biopsy N/A 12/11/2014    Performed by Jake Alvarez MD at [facility] OR [facility]    flexible bronchoscopy with tissue biopsy  CPT 28531 N/A 8/15/2019    Performed by Jake Alvarez MD at [facility] OR 2ND FLR    flexible bronchoscopy with tissue biopsy CPT 26063  N/A 7/26/2019    [facility]    HYSTERECTOMY  04/2017    [facility]    INJECTION-VOCAL CORD Left 6/24/2014    Performed by Jared Whatley MD at [facility] OR [address] [place] [address]    [id number]-L-CATH to right chest and removal of portacath to left chests wall. Bilateral 6/24/2014    [facility]    LARYNX SURGERY  2016    LUNG TRANSPLANT Bilateral 6/12/14    MAXILLARY ANTROSTOMY  4/9/2019     [facility]    MYRINGOTOMY W/ TUBES Right 04/2017    MYRINGOTOMY WITH INSERTION OF PE TUBES Right 4/15/2017    Performed [facility] Gary Null [facility]    PLACEMENT-TUBE-PEG  5/15/2014    Performed by David Moses MD at [facility] OR [address] [place] [address]    PORTACATH PLACEMENT Right     rt sc    REDO BILATERAL LUNG TRANSPLANT; CLAMSHELL Bilateral 6/18/2019    Performed by Jonathan Newby MD at [facility] OR [address] [place] [address]    REMOVAL-TUBE-PEG  5/15/2014    Performed by David Moses MD at [facility] OR [address] [place] [address]    RHC for lung re-transplant N/A 1/22/2019    Performed by Laura Rachel MD at [facility] CATH LAB    ROBOTIC ASSISTED LAPAROSCOPIC HYSTERECTOMY N/A 4/25/2017    Performed by Deny Dias Jr., MD at [facility] OR    SALPINGECTOMY Bilateral 2015    KJB    SALPINGECTOMY-LAPAROSCOPIC Bilateral 5/19/2015    Performed by Deny Dias Jr., MD at [facility] OR    SINUS SURGERY FUNCTIONAL ENDOSCOPIC WITH NAVIGATION Bilateral 4/3/2017    Performed by Cesar Olsen MD at [facility] OR [address] [place] [address]    SINUS SURGERY FUNCTIONAL ENDOSCOPIC WITH NAVIGATION, 80556, 88359, 42265, 15243 Bilateral 2/5/2015    Per[facility]    SPHENOIDECTOMY Bilateral 4/9/2019    Performed by Adin Burks III, MD at [facility] OR [address] [place] [address]    THYROPLASTY - Medialization laryngoplasty, cricothyroid subluxation, arytenoid repositioning Left 8/2/2016    Per[facility] Gary [facility]    TRANSPLANT-LUNG Bilateral 6/11/2014    Performed by Adolfo Huston MD at [facility] OR [address] [place] [address]       Review of patient's allergies indicates:   Allergen Reactions    Albuterol Palpitations    Colistin Anaphylaxis    Vancomycin analogues      Infusion reaction that does not resolve with slowing  Pt. States she can tolerate it when given with 50 mg Benadryl and ran over 3 hours    Neupogen [filgrastim] Other (See Comments)     Ostealgia after five daily doses of 300 mcg.       Bactrim [sulfamethoxazole-trimethoprim] Hives    Ceftazidime Hives     Pt stated can tolerate cefapine not ceftazidime    Ceftazidime     Dronabinol Other (See Comments)     Mental changes/hallucinations    Haldol [haloperidol lactate] Other (See Comments)     Seizure like activity    Nsaids (non-steroidal anti-inflammatory drug)      Cannot have due to lung transplant    Adhesive Rash     Cloth tape- please use tegaderm or paper tape    Aztreonam Rash    Ciprofloxacin Nausea And Vomiting     Projectile N/V, per patient.  Unwilling to retry therapy.       Medications:  Medications Prior to Admission   Medication Sig    blood sugar diagnostic Strp Preferred by insurance. Testing BG 4 times daily.    blood-glucose meter Misc Use to check blood glucose 4 (four) times daily.    calcium-vitamin D3 (OS-KATY 500 + D3) 500 mg(1,250mg) -200 unit per tablet Take 1 tablet by mouth 2 (two) times daily with meals.    dapsone 100 MG Tab Take 1 tablet (100 mg total) by mouth once daily.    diphenhydrAMINE (BENADRYL) 50 MG tablet Take 1 tablet (50 mg total) by mouth every 6 (six) hours as needed for Itching.    fexofenadine (ALLEGRA) 180 MG tablet Take 180 mg by mouth once daily.    fluticasone propionate (FLONASE) 50 mcg/actuation nasal spray INSERT 2 SPRAYS IN EACH NOSTRIL DAILY    folic acid (FOLVITE) 1 MG tablet Take 1 tablet (1,000 mcg total) by mouth once daily.    gabapentin (NEURONTIN) 300 MG capsule Take 1 capsule (300 mg total) by mouth 3 (three) times daily.    hydrocortisone 1 % cream Apply topically 2 (two) times daily.    inhalation spacing device (PROCHAMBER) USE AS DIRECTED    insulin aspart U-100 (NOVOLOG) 100 unit/mL (3 mL) InPn pen Inject 1-10 Units into the skin before meals and at bedtime as needed (Hyperglycemia).    lancets 33 gauge Misc Use to check blood glucose four times daily    levalbuterol (XOPENEX) 1.25 mg/3 mL nebulizer solution Take 3 mLs (1.25 mg total) by nebulization  "every 8 (eight) hours as needed for Wheezing or Shortness of Breath. Rescue    lipase-protease-amylase (CREON) 36,000-114,000- 180,000 unit CpDR Take 4 capsules by mouth 3 (three) times daily with meals. Take 3 with snacks as needed. (Patient taking differently: Take 5 capsules by mouth 3 (three) times daily with meals. )    LORazepam (ATIVAN) 1 MG tablet Take 1 tablet (1 mg total) by mouth every 8 (eight) hours as needed for Anxiety.    magnesium oxide (MAG-OX) 400 mg (241.3 mg magnesium) tablet Take 1 tablet (400 mg total) by mouth 3 (three) times daily.    metoprolol tartrate (LOPRESSOR) 25 MG tablet Take 1 tablet (25 mg total) by mouth 2 (two) times daily.    montelukast (SINGULAIR) 10 mg tablet Take 1 tablet (10 mg total) by mouth once daily.    morphine (MS CONTIN) 15 MG 12 hr tablet Take 1 tablet by mouth twice daily    multivit,min52-folic-vitK-cQ10 (AQUADEKS) 100-700-10 mcg-mcg-mg Cap cap 1 tab twice daily    omeprazole (PRILOSEC) 40 MG capsule Take 1 capsule (40 mg total) by mouth once daily.    ondansetron (ZOFRAN) 8 MG tablet Take 1 tablet (8 mg total) by mouth every 12 (twelve) hours as needed for Nausea.    oxyCODONE (ROXICODONE) 5 MG immediate release tablet take 1 tablet by mouth four times a day (Patient taking differently: 10 mg every 6 (six) hours as needed. )    pen needle, diabetic 32 gauge x 5/32" Ndle Injecting 3 times a day    polyethylene glycol (GLYCOLAX) 17 gram PwPk Take 17 g by mouth 2 (two) times daily.    predniSONE (DELTASONE) 5 MG tablet Take 3 tablets (15 mg total) by mouth once daily.    QUEtiapine (SEROQUEL) 25 MG Tab Take 1 tablet by mouth in morning and 2 tablets at bedtime    SITagliptin (JANUVIA) 100 MG Tab Take 1 tablet (100 mg total) by mouth once daily.    tacrolimus (PROGRAF) 0.5 MG Cap Take 1 capsule (0.5 mg total) by mouth every morning. Total daily dose is 1.5 mg every morning and 1 mg every evening.    tacrolimus (PROGRAF) 1 MG Cap Take 1 capsule (1 mg " total) by mouth every 12 (twelve) hours.    tiZANidine (ZANAFLEX) 4 MG tablet Take 1 tablet (4 mg total) by mouth 2 (two) times daily.    topiramate (TOPAMAX) 25 MG tablet Take 1 tablet (25 mg total) by mouth 2 (two) times daily    valGANciclovir (VALCYTE) 450 mg Tab Take 1 tablet (450 mg total) by mouth 2 (two) times daily.    venlafaxine (EFFEXOR-XR) 150 MG Cp24 Take 1 capsule (150 mg total) by mouth once daily.    venlafaxine (EFFEXOR-XR) 37.5 MG 24 hr capsule Take 1 capsule (37.5 mg total) by mouth once daily.    voriconazole (VFEND) 200 MG Tab Take 1 tablet (200 mg total) by mouth 2 (two) times daily.     Antibiotics (From admission, onward)    Start     Stop Route Frequency Ordered    08/26/19 1130  ceftolozane-tazobactam (ZERBAXA) 1,500 mg in dextrose 5 % 100 mL      -- IV Every 8 hours (non-standard times) 08/26/19 1020        Antifungals (From admission, onward)    Start     Stop Route Frequency Ordered    08/26/19 2100  voriconazole tablet 200 mg      -- OG 2 times daily 08/26/19 1106    08/26/19 1130  micafungin 100 mg in sodium chloride 0.9 % 100 mL IVPB (ready to mix system)      -- IV Every 24 hours (non-standard times) 08/26/19 1022        Antivirals (From admission, onward)    None           Immunization History   Administered Date(s) Administered    Cytomegalovirus Immune Globulin 06/13/2014    DTP 08/23/1994, 08/16/1995    Hepatitis A, Adult 04/04/2018, 01/22/2019    Hepatitis B 08/10/2000, 11/20/2000    Hepatitis B, Pediatric/Adolescent 11/28/2001    Influenza 09/12/2018    Influenza - Quadrivalent - PF (6 months and older) 10/04/2017, 09/06/2018    Influenza - Trivalent (ADULT) 11/28/2001, 01/22/2002, 12/10/2002, 11/26/2003, 10/19/2004, 11/15/2005, 11/11/2006, 10/15/2007, 10/28/2009, 11/03/2010, 10/19/2011    Influenza - Trivalent - PF (ADULT) 10/14/2008, 10/22/2012, 11/02/2016    Influenza A (H1N1) 2009 Monovalent - IM - PF 12/01/2009    MMR 08/23/1994, 08/16/1995     Meningococcal Conjugate (MCV4P) 10/24/2008    OPV 08/23/1994, 08/16/1995, 08/10/2000    Pneumococcal Conjugate - 13 Valent 10/02/2017    Pneumococcal Conjugate - 7 Valent 01/22/2002    Pneumococcal Polysaccharide - 23 Valent 01/22/2002    Td (ADULT) 08/10/2000, 12/16/2010    Tdap 01/22/2019    Typhoid - ViCPs 04/06/2018       Family History     Problem Relation (Age of Onset)    Cancer Maternal Grandmother    Diabetes Maternal Grandfather    Drug abuse Maternal Grandfather    Hypertension Maternal Grandmother    Thrombocytopenia Maternal Grandfather        Social History     Socioeconomic History    Marital status: Single     Spouse name: Not on file    Number of children: Not on file    Years of education: Not on file    Highest education level: Not on file   Occupational History    Not on file   Social Needs    Financial resource strain: Not on file    Food insecurity:     Worry: Not on file     Inability: Not on file    Transportation needs:     Medical: Not on file     Non-medical: Not on file   Tobacco Use    Smoking status: Never Smoker    Smokeless tobacco: Never Used   Substance and Sexual Activity    Alcohol use: No     Comment: Has not had an alcoholic drink in more than 2 months.    Drug use: No    Sexual activity: Yes     Partners: Male     Birth control/protection: See Surgical Hx     Comment: current partner since Oct 2016   Lifestyle    Physical activity:     Days per week: Not on file     Minutes per session: Not on file    Stress: Not on file   Relationships    Social connections:     Talks on phone: Not on file     Gets together: Not on file     Attends Yarsani service: Not on file     Active member of club or organization: Not on file     Attends meetings of clubs or organizations: Not on file     Relationship status: Not on file   Other Topics Concern    Not on file   Social History Narrative    Not on file     Review of Systems   Unable to perform ROS: Intubated      Objective:     Vital Signs (Most Recent):  Temp: 98.5 °F (36.9 °C) (08/28/19 1100)  Pulse: 93 (08/28/19 1356)  Resp: (!) 24 (08/28/19 0731)  BP: 110/82 (08/28/19 1200)  SpO2: 99 % (08/28/19 1356) Vital Signs (24h Range):  Temp:  [98 °F (36.7 °C)-98.6 °F (37 °C)] 98.5 °F (36.9 °C)  Pulse:  [] 93  Resp:  [24-25] 24  SpO2:  [96 %-100 %] 99 %  BP: ()/(56-82) 110/82  Arterial Line BP: ()/(47-79) 108/53     Weight: 51.9 kg (114 lb 6.7 oz)  Body mass index is 22.35 kg/m².    Estimated Creatinine Clearance: 98.5 mL/min (based on SCr of 0.6 mg/dL).    Physical Exam   Constitutional: She appears well-developed and well-nourished. She has a sickly appearance. No distress. She is intubated.   HENT:   Head: Normocephalic.   Nose: Nose normal.   Eyes: Conjunctivae are normal. No scleral icterus.   Neck: Normal range of motion. Neck supple. No JVD present. No tracheal deviation present.   Cardiovascular: Regular rhythm and normal heart sounds. Tachycardia present. Exam reveals no gallop and no friction rub.   No murmur heard.  Pulmonary/Chest: She is intubated. No respiratory distress. She has decreased breath sounds in the right lower field. She has no wheezes. She has rhonchi in the right upper field, the right middle field, the left upper field and the left middle field. She has no rales.   Mechanical BS bilaterally   Abdominal: Soft. Bowel sounds are normal. She exhibits no distension. There is no tenderness.   Genitourinary:   Genitourinary Comments: asleh   Musculoskeletal: Normal range of motion. She exhibits no edema, tenderness or deformity.   Neurological:   Follows commands and moves all 4 extremities   Skin: Skin is warm and dry. No erythema. There is pallor.   Nursing note and vitals reviewed.      Significant Labs:   CBC:   Recent Labs   Lab 08/27/19  1014 08/27/19  1422 08/28/19  0415   WBC 6.14 5.46 4.67   HGB 7.9* 7.8* 8.1*   HCT 24.3* 24.7* 26.2*   * 128* 117*     CMP:   Recent Labs    Lab 08/27/19  1014 08/27/19  1422 08/27/19  2200 08/28/19  0415     137 136  136 139 139   K 4.5  4.5 4.8  4.8 4.8 4.9     100 100  100 100 104   CO2 22*  22* 25  25 26 21*   *  204* 175*  175* 117* 152*   BUN 12  12 8  8 3* 4*   CREATININE 1.1  1.1 0.8  0.8 0.6 0.6   CALCIUM 8.3*  8.3* 8.5*  8.5* 9.1 8.8   PROT 6.8  6.8 7.1  --  8.6*   ALBUMIN 2.2*  2.2* 2.2*  2.2* 2.5* 2.5*  2.5*   BILITOT 0.4  0.4 0.5  --  0.4   ALKPHOS 150*  150* 161*  --  173*   AST 11  11 22  --  15   ALT 10  10 9*  --  10   ANIONGAP 15  15 11  11 13 14   EGFRNONAA >60.0  >60.0 >60.0  >60.0 >60.0 >60.0       Significant Imaging: I have reviewed all pertinent imaging results/findings within the past 24 hours.

## 2019-08-28 NOTE — SUBJECTIVE & OBJECTIVE
Interval History: Seen on SLED this AM, patient with good clearance and volume removal. Patient net negative 2.4 L/24 hrs. Remains anuric, 44 ml/24 hrs. On Vasopressin and Levophed for BP support. CVP this AM 20.    Review of patient's allergies indicates:   Allergen Reactions    Albuterol Palpitations    Colistin Anaphylaxis    Vancomycin analogues      Infusion reaction that does not resolve with slowing  Pt. States she can tolerate it when given with 50 mg Benadryl and ran over 3 hours    Neupogen [filgrastim] Other (See Comments)     Ostealgia after five daily doses of 300 mcg.      Bactrim [sulfamethoxazole-trimethoprim] Hives    Ceftazidime Hives     Pt stated can tolerate cefapine not ceftazidime    Ceftazidime     Dronabinol Other (See Comments)     Mental changes/hallucinations    Haldol [haloperidol lactate] Other (See Comments)     Seizure like activity    Nsaids (non-steroidal anti-inflammatory drug)      Cannot have due to lung transplant    Adhesive Rash     Cloth tape- please use tegaderm or paper tape    Aztreonam Rash    Ciprofloxacin Nausea And Vomiting     Projectile N/V, per patient.  Unwilling to retry therapy.     Current Facility-Administered Medications   Medication Frequency    0.9%  NaCl infusion (CRRT USE ONLY) Continuous    ceftolozane-tazobactam (ZERBAXA) 1,500 mg in dextrose 5 % 100 mL Q8H    chlorhexidine 0.12 % solution 15 mL BID    dexmedetomidine (PRECEDEX) 400mcg/100mL 0.9% NaCL infusion Continuous    dextrose 10% (D10W) Bolus PRN    dextrose 10% (D10W) Bolus PRN    diphenhydrAMINE 12.5 mg/5 mL elixir 25 mg Daily PRN    fentaNYL 2500 mcg in 0.9% sodium chloride 250 mL infusion premix (titrating) Continuous    heparin (porcine) injection 5,000 Units Q12H    insulin regular (Humulin R) 100 Units in sodium chloride 0.9% 100 mL infusion Continuous    levalbuterol nebulizer solution 1.25 mg Q8H    LORazepam tablet 1 mg Q8H PRN    magnesium sulfate 2g in water  50mL IVPB (premix) PRN    micafungin 100 mg in sodium chloride 0.9 % 100 mL IVPB (ready to mix system) Q24H    norepinephrine 4 mg in dextrose 5% 250 mL infusion (premix) (titrating) Continuous    ondansetron injection 8 mg Q6H PRN    predniSONE tablet 15 mg Daily    propofol (DIPRIVAN) 10 mg/mL infusion Continuous    QUEtiapine tablet 25 mg Daily    QUEtiapine tablet 50 mg QHS    sodium chloride 0.9% bolus 1,000 mL Once    sodium phosphate 20.01 mmol in dextrose 5 % 250 mL IVPB PRN    sodium phosphate 30 mmol in dextrose 5 % 250 mL IVPB PRN    sodium phosphate 39.99 mmol in dextrose 5 % 250 mL IVPB PRN    topiramate tablet 25 mg BID    vancomycin (VANCOCIN) 1,250 mg in dextrose 5 % 250 mL IVPB Once    vasopressin (PITRESSIN) 0.2 Units/mL in dextrose 5 % 100 mL infusion Continuous    voriconazole tablet 200 mg BID       Objective:     Vital Signs (Most Recent):  Temp: 98.5 °F (36.9 °C) (08/28/19 1100)  Pulse: 104 (08/28/19 1200)  Resp: (!) 24 (08/28/19 0731)  BP: 110/82 (08/28/19 1200)  SpO2: 99 % (08/28/19 1200)  O2 Device (Oxygen Therapy): ventilator (08/28/19 1155) Vital Signs (24h Range):  Temp:  [98 °F (36.7 °C)-98.6 °F (37 °C)] 98.5 °F (36.9 °C)  Pulse:  [] 104  Resp:  [24-25] 24  SpO2:  [96 %-100 %] 99 %  BP: ()/(56-82) 110/82  Arterial Line BP: ()/(47-79) 123/65     Weight: 51.9 kg (114 lb 6.7 oz) (08/28/19 0400)  Body mass index is 22.35 kg/m².  Body surface area is 1.48 meters squared.    I/O last 3 completed shifts:  In: 8809.3 [I.V.:8514.3; NG/GT:95; IV Piggyback:200]  Out: 00458 [Urine:80; Drains:200; Other:82429]    Physical Exam   Constitutional: She appears well-developed. She is sedated and intubated.   Sedated on mechanical ventilation   HENT:   Head: Normocephalic and atraumatic.   Right Ear: External ear normal.   Left Ear: External ear normal.   Eyes: Right eye exhibits no discharge. Left eye exhibits no discharge. No scleral icterus.   Cardiovascular: Normal  rate and regular rhythm.   Pulmonary/Chest: Effort normal. She is intubated. No respiratory distress. She has no wheezes. She has rales.   Transmitted ventilator sounds   Musculoskeletal: She exhibits edema. She exhibits no tenderness or deformity.   Skin: There is pallor.   Nursing note and vitals reviewed.      Significant Labs:  CBC:   Recent Labs   Lab 08/28/19 0415   WBC 4.67   RBC 2.56*   HGB 8.1*   HCT 26.2*   *   *   MCH 31.6*   MCHC 30.9*     CMP:   Recent Labs   Lab 08/28/19 0415   *   CALCIUM 8.8   ALBUMIN 2.5*  2.5*   PROT 8.6*      K 4.9   CO2 21*      BUN 4*   CREATININE 0.6   ALKPHOS 173*   ALT 10   AST 15   BILITOT 0.4

## 2019-08-28 NOTE — SUBJECTIVE & OBJECTIVE
Subjective:     Interval History: No acute events overnight. Remains oliguric and switched to PSV  ventilation. Tolerating CRRT. Continues to require low dose vasopressin and levophed. Now with staph bacteremia and port removed at the bedside.     Continuous Infusions:   sodium chloride 0.9% 200 mL/hr at 08/28/19 1800    dexmedetomidine (PRECEDEX) infusion 1.4 mcg/kg/hr (08/28/19 1817)    fentanyl 17.5 mL/hr at 08/28/19 1800    insulin (HUMAN R) infusion (adults) 0.8 Units/hr (08/28/19 1800)    norepinephrine bitartrate-D5W Stopped (08/28/19 1500)    propofol 50 mcg/kg/min (08/28/19 1800)    vasopressin (PITRESSIN) infusion 0.04 Units/min (08/28/19 1800)     Scheduled Meds:   ceftolozane-tazobactam  1,500 mg Intravenous Q8H    chlorhexidine  15 mL Mouth/Throat BID    heparin (porcine)  5,000 Units Subcutaneous Q12H    levalbuterol  1.25 mg Nebulization Q8H    micafungin (MYCAMINE) IVPB  100 mg Intravenous Q24H    predniSONE  15 mg Per OG tube Daily    QUEtiapine  25 mg Per OG tube Daily    QUEtiapine  50 mg Per OG tube QHS    sodium chloride 0.9%  1,000 mL Intravenous Once    topiramate  25 mg Per OG tube BID    voriconazole  200 mg Per OG tube BID     PRN Meds:Dextrose 10% Bolus, Dextrose 10% Bolus, diphenhydrAMINE, LORazepam, magnesium sulfate IVPB, ondansetron, sodium phosphate IVPB, sodium phosphate IVPB, sodium phosphate IVPB    Review of patient's allergies indicates:   Allergen Reactions    Albuterol Palpitations    Colistin Anaphylaxis    Vancomycin analogues      Infusion reaction that does not resolve with slowing  Pt. States she can tolerate it when given with 50 mg Benadryl and ran over 3 hours    Neupogen [filgrastim] Other (See Comments)     Ostealgia after five daily doses of 300 mcg.      Bactrim [sulfamethoxazole-trimethoprim] Hives    Ceftazidime Hives     Pt stated can tolerate cefapine not ceftazidime    Ceftazidime     Dronabinol Other (See Comments)     Mental  changes/hallucinations    Haldol [haloperidol lactate] Other (See Comments)     Seizure like activity    Nsaids (non-steroidal anti-inflammatory drug)      Cannot have due to lung transplant    Adhesive Rash     Cloth tape- please use tegaderm or paper tape    Aztreonam Rash    Ciprofloxacin Nausea And Vomiting     Projectile N/V, per patient.  Unwilling to retry therapy.       Review of Systems   Unable to perform ROS: Intubated     Objective:   Physical Exam   Constitutional: She appears well-developed and well-nourished. She has a sickly appearance. No distress. She is intubated.   HENT:   Head: Normocephalic.   Nose: Nose normal.   Eyes: Conjunctivae are normal. No scleral icterus.   Neck: Normal range of motion. Neck supple. No JVD present. No tracheal deviation present.   Cardiovascular: Regular rhythm and normal heart sounds. Tachycardia present. Exam reveals no gallop and no friction rub.   No murmur heard.  Pulmonary/Chest: She is intubated. No respiratory distress. She has decreased breath sounds in the right lower field. She has no wheezes. She has rhonchi in the right upper field, the right middle field, the left upper field and the left middle field. She has no rales.   Mechanical BS bilaterally   Abdominal: Soft. Bowel sounds are normal. She exhibits no distension. There is no tenderness.   Genitourinary:   Genitourinary Comments: saleh   Musculoskeletal: Normal range of motion. She exhibits no edema, tenderness or deformity.   Neurological:   Follows commands and moves all 4 extremities   Skin: Skin is warm and dry. No erythema. There is pallor.   Nursing note and vitals reviewed.        Vital Signs (Most Recent):  Temp: 98.5 °F (36.9 °C) (08/28/19 1100)  Pulse: 73 (08/28/19 1815)  Resp: 15 (08/28/19 1631)  BP: 116/76 (08/28/19 1800)  SpO2: 100 % (08/28/19 1815) Vital Signs (24h Range):  Temp:  [98.2 °F (36.8 °C)-98.6 °F (37 °C)] 98.5 °F (36.9 °C)  Pulse:  [] 73  Resp:  [15-24] 15  SpO2:   [96 %-100 %] 100 %  BP: ()/(56-92) 116/76  Arterial Line BP: ()/(47-83) 109/63     Weight: 51.9 kg (114 lb 6.7 oz)  Body mass index is 22.35 kg/m².      Intake/Output Summary (Last 24 hours) at 8/28/2019 1831  Last data filed at 8/28/2019 1800  Gross per 24 hour   Intake 7000.63 ml   Output 9434 ml   Net -2433.37 ml       Ventilator Data:     Vent Mode: A/C  Oxygen Concentration (%):  [38-43] 39  Resp Rate Total:  [8.8 br/min-31 br/min] 24 br/min  Vt Set:  [375 mL] 375 mL  PEEP/CPAP:  [5 cmH20] 5 cmH20  Pressure Support:  [14 cmH20] 14 cmH20  Mean Airway Pressure:  [7.4 dnV58-01 cmH20] 11 cmH20    Hemodynamic Parameters:       Lines/Drains:       Hemodialysis Catheter 08/26/19 2225 right femoral (Active)   Site Assessment Clean;Dry;Intact 8/27/2019  3:00 AM   Status Accessed 8/27/2019  3:00 AM   Flows Good 8/27/2019  3:00 AM   Dressing Intervention New dressing 8/26/2019 10:30 PM   Dressing Status Biopatch in place;Clean;Dry;Intact 8/27/2019  3:00 AM   Dressing Change Due 08/30/19 8/27/2019  3:00 AM   Verification by X-ray Other (Comment) 8/26/2019 10:30 PM   Dressing Occlusive 8/27/2019  3:00 AM   Daily Line Review Performed 8/27/2019  3:00 AM   Number of days: 0            Port A Cath Single Lumen 08/26/19 right subclavian (Active)   Dressing Type Transparent 8/27/2019  3:00 AM   Dressing Status Clean;Dry;Intact 8/27/2019  3:00 AM   Dressing Intervention Dressing reinforced 8/26/2019  3:00 PM   Dressing Change Due 09/02/19 8/27/2019  3:00 AM   Line Status Infusing 8/27/2019  3:00 AM   Daily Line Review Performed 8/27/2019  3:00 AM   Number of days: 1            Percutaneous Central Line Insertion/Assessment - triple lumen  08/26/19 1135 left internal jugular (Active)   Dressing biopatch in place;dressing dry and intact 8/27/2019  3:00 AM   Securement secured w/ sutures 8/27/2019  3:00 AM   Additional Site Signs no erythema;no warmth;no edema;no pain;no palpable cord;no streak formation;no drainage  8/27/2019  3:00 AM   Distal Patency/Care infusing 8/27/2019  3:00 AM   Medial Patency/Care infusing 8/27/2019  3:00 AM   Proximal Patency/Care infusing 8/27/2019  3:00 AM   Waveform normal 8/27/2019  3:00 AM   Line Interventions line leveled/zeroed 8/27/2019  3:00 AM   Dressing Change Due 09/02/19 8/27/2019  3:00 AM   Daily Line Review Performed 8/27/2019  3:00 AM   Number of days: 0            Peripheral IV - Single Lumen 08/26/19 22 G Right Hand (Active)   Site Assessment Clean;Dry;Intact 8/27/2019  3:00 AM   Line Status Infusing 8/27/2019  3:00 AM   Dressing Status Clean;Dry;Intact 8/27/2019  3:00 AM   Dressing Intervention Dressing reinforced 8/26/2019  3:00 PM   Dressing Change Due 08/30/19 8/27/2019  3:00 AM   Site Change Due 08/30/19 8/26/2019  7:00 PM   Reason Not Rotated Not due 8/27/2019  3:00 AM   Number of days: 1            Peripheral IV - Single Lumen 08/26/19 1119 22 G Left;Posterior Hand (Active)   Site Assessment Clean;Dry;Intact 8/27/2019  3:00 AM   Line Status Infusing 8/27/2019  3:00 AM   Dressing Status Clean;Dry;Intact 8/27/2019  3:00 AM   Dressing Intervention Dressing reinforced 8/26/2019  3:00 PM   Dressing Change Due 08/30/19 8/27/2019  3:00 AM   Site Change Due 08/30/19 8/26/2019  7:00 PM   Reason Not Rotated Not due 8/27/2019  3:00 AM   Number of days: 0            Peripheral IV - Single Lumen 08/26/19 1119 20 G Anterior;Right Forearm (Active)   Site Assessment Clean;Dry;Intact 8/27/2019  3:00 AM   Line Status Infusing 8/27/2019  3:00 AM   Dressing Status Clean;Dry;Intact 8/27/2019  3:00 AM   Dressing Intervention Dressing reinforced 8/26/2019  3:00 PM   Dressing Change Due 08/30/19 8/27/2019  3:00 AM   Site Change Due 08/30/19 8/26/2019  7:00 PM   Reason Not Rotated Not due 8/27/2019  3:00 AM   Number of days: 0            Arterial Line 08/26/19 1700 Right Femoral (Active)   Site Assessment Clean;Dry;Intact 8/27/2019  3:00 AM   Line Status Pulsatile blood flow 8/27/2019  3:00 AM   Art  "Line Waveform Appropriate 8/27/2019  3:00 AM   Arterial Line Interventions Zeroed and calibrated;Leveled;Connections checked and tightened;Flushed per protocol;Line pulled back 8/27/2019  3:00 AM   Color/Movement/Sensation Capillary refill less than 3 sec 8/27/2019  3:00 AM   Dressing Type Transparent 8/27/2019  3:00 AM   Dressing Status Clean;Dry;Intact 8/27/2019  3:00 AM   Dressing Change Due 08/30/19 8/27/2019  3:00 AM   Number of days: 0            NG/OG Tube 08/26/19 1140 orogastric 18 Fr. Center mouth (Active)   Placement Check placement verified by x-ray 8/27/2019  3:00 AM   Tolerance signs/symptoms of discomfort 8/27/2019  3:00 AM   Securement secured to commercial device 8/27/2019  3:00 AM   Clamp Status/Tolerance unclamped 8/27/2019  3:00 AM   Suction Setting/Drainage Method suction at;low;moderate 8/27/2019  3:00 AM   Insertion Site Appearance no redness, warmth, tenderness, skin breakdown, drainage 8/27/2019  3:00 AM   Drainage Green;Brown 8/27/2019  3:00 AM   Intake (mL) 60 mL 8/27/2019  9:00 AM   Number of days: 0            Urethral Catheter 08/26/19 1230 (Active)   Site Assessment Clean;Intact 8/27/2019  3:00 AM   Collection Container Urimeter 8/27/2019  3:00 AM   Securement Method secured to top of thigh w/ adhesive device 8/27/2019  3:00 AM   Catheter Care Performed no 8/27/2019  3:00 AM   Reason for Continuing Urinary Catheterization Critically ill in ICU requiring intensive monitoring 8/27/2019  3:00 AM   CAUTI Prevention Bundle StatLock in place w 1" slack;Intact seal between catheter & drainage tubing;Drainage bag off the floor;No dependent loops or kinks;Green sheeting clip in use;Drainage bag not overfilled (<2/3 full);Drainage bag below bladder 8/26/2019 11:00 PM   Output (mL) 10 mL 8/27/2019  9:00 AM   Number of days: 0       Significant Labs:  CBC:  Recent Labs   Lab 08/28/19  0415   WBC 4.67   RBC 2.56*   HGB 8.1*   HCT 26.2*   *   *   MCH 31.6*   MCHC 30.9*     BMP:  Recent " Labs   Lab 08/28/19  1425      K 4.9      CO2 17*   BUN 4*   CREATININE 0.5   CALCIUM 8.5*      Tacrolimus Levels:  Recent Labs   Lab 08/28/19  0415   TACROLIMUS 6.3     Microbiology:  Microbiology Results (last 7 days)     Procedure Component Value Units Date/Time    Blood culture [[id number]] Collected:  08/28/19 1020    Order Status:  Completed Specimen:  Blood from Peripheral, Wrist, Right Updated:  08/28/19 1715     Blood Culture, Routine No Growth to date    Blood culture [[id number]] Collected:  08/28/19 1250    Order Status:  Sent Specimen:  Blood from Peripheral, Hand, Right Updated:  08/28/19 1300    Culture, Respiratory [[id number]]  (Abnormal)  (Susceptibility) Collected:  08/26/19 1441    Order Status:  Completed Specimen:  Respiratory from Bronchial Wash Updated:  08/28/19 1213     Respiratory Culture METHICILLIN RESISTANT STAPHYLOCOCCUS AUREUS  Many       Gram Stain (Respiratory) <10 epithelial cells per low power field.     Gram Stain (Respiratory) Few WBC's     Gram Stain (Respiratory) Many Gram positive cocci    Narrative:       Bronchial Wash    Fungus culture [[id number]]  (Abnormal) Collected:  08/26/19 1441    Order Status:  Completed Specimen:  Respiratory from Bronchial Wash Updated:  08/28/19 1101     Fungus (Mycology) Culture YEAST   Many  Identification pending      Narrative:       Bronchial Wash    Blood culture #1 **CANNOT BE ORDERED STAT** [[id number]]  (Abnormal) Collected:  08/26/19 0848    Order Status:  Completed Specimen:  Blood from Line, Port A Cath Updated:  08/28/19 1031     Blood Culture, Routine Gram stain lisa bottle: Gram positive cocci in clusters resembling Staph       Results called to and read back by:Monet Martinez RN 08/27/2019  06:04      Gram stain aer bottle: Gram positive cocci in clusters resembling Staph       08/28/2019  10:29      STAPHYLOCOCCUS AUREUS  ID consult required at [facility] and [facility].  For susceptibility see  order #[id number]      Blood culture #2 **CANNOT BE ORDERED STAT** [[id number]]  (Abnormal) Collected:  08/26/19 0859    Order Status:  Completed Specimen:  Blood from Wrist, Right Updated:  08/28/19 0931     Blood Culture, Routine Gram stain aer bottle: Gram positive cocci in clusters resembling Staph       Results called to and read back by:Monet Martinez RN 08/27/2019 03:23      Gram stain lisa bottle: Gram positive cocci in clusters resembling Staph       08/27/2019  11:38      STAPHYLOCOCCUS AUREUS  Susceptibility pending  ID consult required at [facility],[place],[facility] and [facility]   locations.      AFB Culture & Smear [[id number]] Collected:  08/26/19 1441    Order Status:  Completed Specimen:  Respiratory from Bronchial Wash Updated:  08/27/19 2127     AFB Culture & Smear Culture in progress     AFB CULTURE STAIN No acid fast bacilli seen.    Narrative:       Bronchial Wash    Respiratory Infection Panel, Nasopharyngeal [[id number]] Collected:  08/26/19 1003    Order Status:  Completed Specimen:  Nasopharyngeal Swab Updated:  08/26/19 1344     Respiratory Infection Panel Source NP Swab     Adenovirus Not Detected     Coronavirus 229E Not Detected     Coronavirus HKU1 Not Detected     Coronavirus NL63 Not Detected     Coronavirus OC43 Not Detected     Human Metapneumovirus Not Detected     Human Rhinovirus/Enterovirus Not Detected     Influenza A (subtypes H1, H1-2009,H3) Not Detected     Influenza B Not Detected     Parainfluenza Virus 1 Not Detected     Parainfluenza Virus 2 Not Detected     Parainfluenza Virus 3 Not Detected     Parainfluenza Virus 4 Not Detected     Respiratory Syncytial Virus Not Detected     Bordetella Parapertussis (YB8341) Not Detected     Bordetella pertussis (ptxP) Not Detected     Chlamydia pneumoniae Not Detected     Mycoplasma pneumoniae Not Detected    Narrative:       For all other respiratory sources order [id number] Respiratory  Viral Panel by PCR (RVPCR)    Culture, Respiratory   - Cystic Fibrosis [[id number]]     Order Status:  Canceled Specimen:  Respiratory           I have reviewed all pertinent labs within the past 24 hours.    Diagnostic Results:  Chest X-Ray: I personally reviewed the films and findings are:, bilateral opacities with improved aeration as compared to previous CXR. Right pleural effusion.     No Further

## 2019-08-29 NOTE — PLAN OF CARE
Problem: Adult Inpatient Plan of Care  Goal: Plan of Care Review  Recommendations  Recommendation/Intervention:   1. Recommend initiating TF of Peptamen Intense VHP at a goal rate of 40 mL/hr - to provide 960 kcal/day (1377 kcal w/propofol), 88g protein/day, and 806mL free fluid/day.    -If no longer on propofol, recommend Impact Peptide 1.5 at a goal rate of 40 mL/hr - to provide 1440 kcal/day, 90g protein/day, and 739mL free fluid/day.  2. Per Dr. Navarro, recommend using Relizorb cartridge with tube feeds - 2 cartridges per 1000mL tube feed bag.   RD to monitor.    Goals: Patient to receive nutrition by RD follow-up  Nutrition Goal Status: new    Full assessment completed, see RD Note 8/29/2019.

## 2019-08-29 NOTE — SUBJECTIVE & OBJECTIVE
Interval HPI:   Overnight events: Remains in SICU. NAEON. BG is within goal ranges on IV intensive insulin infusion with rates ranging from 0.2-1 u/hr overnight. Insulin infusion stopped at 1030 per protocol. Receiving Prednisone 15 mg daily.   Eating:   NPO  Nausea: No  Hypoglycemia and intervention: No  Fever: No  TPN and/or TF: No  If yes, type of TF/TPN and rate: None    BP (!) 126/94 (BP Location: Right arm, Patient Position: Lying)   Pulse (!) 121   Temp 97.5 °F (36.4 °C)   Resp (!) 25   Ht 5' (1.524 m)   Wt 50 kg (110 lb 3.7 oz)   LMP 10/02/2016 (LMP Unknown)   SpO2 97%   Breastfeeding? No   BMI 21.53 kg/m²     Labs Reviewed and Include    Recent Labs   Lab 08/29/19  0300   *   CALCIUM 8.3*   ALBUMIN 2.5*  2.5*   PROT 7.7      K 4.4   CO2 11*      BUN 19   CREATININE 1.2   ALKPHOS 174*   ALT 11   AST 16   BILITOT 0.3     Lab Results   Component Value Date    WBC 5.71 08/29/2019    HGB 8.0 (L) 08/29/2019    HCT 25.7 (L) 08/29/2019     (H) 08/29/2019     (L) 08/29/2019     Recent Labs   Lab 08/26/19  0847   TSH 1.120     Lab Results   Component Value Date    HGBA1C 4.7 01/09/2019       Nutritional status:   Body mass index is 21.53 kg/m².  Lab Results   Component Value Date    ALBUMIN 2.5 (L) 08/29/2019    ALBUMIN 2.5 (L) 08/29/2019    ALBUMIN 2.4 (L) 08/28/2019     Lab Results   Component Value Date    PREALBUMIN 15 (L) 05/29/2014    PREALBUMIN 11 (L) 05/09/2014    PREALBUMIN 18 (L) 03/19/2014       Estimated Creatinine Clearance: 49.2 mL/min (based on SCr of 1.2 mg/dL).    Accu-Checks  Recent Labs     08/28/19  1413 08/28/19  1556 08/28/19  1753 08/28/19  2002 08/28/19  2151 08/29/19  0029 08/29/19  0311 08/29/19  0418 08/29/19  0613 08/29/19  0839   POCTGLUCOSE 210* 201* 245* 221* 235* 209* 166* 164* 129* 148*       Current Medications and/or Treatments Impacting Glycemic Control  Immunotherapy:    Immunosuppressants     None        Steroids:   Hormones (From  admission, onward)    Start     Stop Route Frequency Ordered    08/27/19 0900  predniSONE tablet 15 mg      -- OG Daily 08/26/19 1106    08/26/19 1031  vasopressin (PITRESSIN) 0.2 Units/mL in dextrose 5 % 100 mL infusion      -- IV Continuous 08/26/19 1031        Pressors:    Autonomic Drugs (From admission, onward)    Start     Stop Route Frequency Ordered    08/26/19 1145  norepinephrine 4 mg in dextrose 5% 250 mL infusion (premix) (titrating)     Question Answer Comment   Titrate by: (in mcg/kg/min) 0.05    Titrate interval: (in minutes) 5    Titrate to maintain: (MAP or SBP) MAP    Greater than: (in mmHg) 65    Maximum dose: (in mcg/kg/min) 3        -- IV Continuous 08/26/19 1042        Hyperglycemia/Diabetes Medications:   Antihyperglycemics (From admission, onward)    Start     Stop Route Frequency Ordered    08/26/19 1549  insulin regular (Humulin R) 100 Units in sodium chloride 0.9% 100 mL infusion     Question:  Insulin Rate Adjustment (DO NOT MODIFY ANSWER)  Answer:  [email or web address]    -- IV Continuous 08/26/19 1549    08/26/19 1515  insulin aspart U-100 (NovoLOG) 100 unit/mL (3 mL) pen     Note to Pharmacy:  Created by cabinet override    08/27 0329   08/26/19 1511

## 2019-08-29 NOTE — ASSESSMENT & PLAN NOTE
Ms. 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 Her baseline SCr in the past year has been around ~ 1.0. LISBETH likely secondary to ATN caused by hypotension/sepsis in the setting of severe metabolic acidosis.    Recommendations:    - Will continue SLED for metabolic clearance and volume management, target -350 ml/hr as tolerated.   - CVP this AM 20 with signs of moderate edema on CXR per read. Patient remains intubated, FiO2 remains at 40%. Still not making adequate urine.    - Strict I/O and chart  - Avoid nephrotoxic medications, NSAIDs, IV contrast, etc.  - Medication doses adjusted to GFR  - Maintain MAP > 65  - Will follow closely

## 2019-08-29 NOTE — PLAN OF CARE
Problem: Adult Inpatient Plan of Care  Goal: Plan of Care Review  Outcome: Ongoing (interventions implemented as appropriate)    Pt hypothermic (87-95 degrees) overnight.  Warming blankets, Warmed IV fluids, and hot packs applied.  Pt remains free from all falls and injuries during shift.  Pt remains intubated and sedated, vent settings: A/C FIO2 at 40% 5 PEEP.  HR maintained 60-80's, MAP's> 65, Sats> 92%.  CVPs: 20,20, 19. Pt remains on CRRT changed to SLED with UF at goal of 350, tolerating well. gtts infusing: propofol @ 50 mcg/kg/min, Fentanyl @ 175 mcg/hr, Precedex @1.4 mcg/kg/hr, Levo @ 0.01 mcg/kg/hr, Vaso @ 0.04 Units/hr, Insulin @ 1.2 Units/hr.  Esophageal temperature probe placed.  Minimal output from OGT, no UOP or wound vac output during shift.  Labs trended, CRRT adjusted per nephrology.  Plan is to wean vent as tolerated, and extubate when pt passes SBT.  POC reviewed with pt and significant other at bedside.  All questions and concerns addressed.  Will continue to monitor.

## 2019-08-29 NOTE — CONSULTS
[facility]  Adult Nutrition  Consult Note    SUMMARY     Recommendations  Recommendation/Intervention:   1. Recommend initiating TF of Peptamen Intense VHP at a goal rate of 40 mL/hr - to provide 960 kcal/day (1377 kcal w/propofol), 88g protein/day, and 806mL free fluid/day.    -If no longer on propofol, recommend Impact Peptide 1.5 at a goal rate of 40 mL/hr - to provide 1440 kcal/day, 90g protein/day, and 739mL free fluid/day.  2. Per Dr. Navarro, recommend using Relizorb cartridge with tube feeds - 2 cartridges per 1000mL tube feed bag.   RD to monitor.    Goals: Patient to receive nutrition by RD follow-up  Nutrition Goal Status: new  Communication of RD Recs: reviewed with physician    Reason for Assessment  Reason For Assessment: consult  Diagnosis: infection/sepsis  Relevant Medical History: CF s/p BLTx 2018 and redo BLTx 2019, HTN, seizures  General Information Comments: Intubated, sedated. On CRRT. Plan to start TF today. Patient's  reports patient with good PO intake up until 2 days PTA. Per chart review, patient with ~20lb weight loss since transplant in . NFPE completed, patient with both fat and muscle wasting. Patient with moderate malnutrition.  Nutrition Discharge Planning: Unable to determine at this time.    Nutrition/Diet History  Factors Affecting Nutritional Intake: NPO, on mechanical ventilation    Anthropometrics  Temp: 99.4 °F (37.4 °C)  Height: 5' (152.4 cm)  Height (inches): 60 in  Weight Method: Bed Scale  Weight: 50 kg (110 lb 3.7 oz)  Weight (lb): 110.23 lb  Ideal Body Weight (IBW), Female: 100 lb  % Ideal Body Weight, Female (lb): 115 lb  BMI (Calculated): 22.5  BMI Grade: 18.5-24.9 - normal  Usual Body Weight (UBW), k kg(2019 per chart review)  % Usual Body Weight: 84.92  % Weight Change From Usual Weight: -15.25 %    Lab/Procedures/Meds  Pertinent Labs Reviewed: reviewed  Pertinent Labs Comments: Glu 161, Ca 8.3, Phos 4.7, Alb 2.5  Pertinent  Medications Reviewed: reviewed  Pertinent Medications Comments: precedex, fentnayl, insulin drip, levophed, propofol, vasopressin    Estimated/Assessed Needs  Weight Used For Calorie Calculations: 50 kg (110 lb 3.7 oz)  Energy Calorie Requirements (kcal): 3588-6096 kcal/day  Energy Need Method: Kcal/kg(25-30)  Protein Requirements: 75 g/day(1.5 g/kg)  Weight Used For Protein Calculations: 50 kg (110 lb 3.7 oz)  Fluid Requirements (mL): 1 mL/kcal or per MD  Estimated Fluid Requirement Method: RDA Method  RDA Method (mL): 1250    Nutrition Prescription Ordered  Current Diet Order: NPO    Evaluation of Received Nutrient/Fluid Intake  Other Calories (kcal): 417(propofol)  I/O: -1.4L x 24hrs, -3.4L since admit  Comments: No BM recorded  % Intake of Estimated Energy Needs: 0 - 25 %  % Meal Intake: NPO    Nutrition Risk  Level of Risk/Frequency of Follow-up: high(2x/week)     Assessment and Plan  Moderate malnutrition  Contributing Nutrition Diagnosis  Malnutrition in the context of Chronic Illness/Injury    Related to (etiology):  CF s/p BLTx 6/2018 and redo BLTx 6/2019    Signs and Symptoms (as evidenced by):  Energy Intake: <50% of estimated energy requirement for 2 days PTA per , likely inadequate before that  Body Fat Depletion: mild depletion of orbitals and thoracic and lumbar region   Muscle Mass Depletion: mild depletion of temples, clavicle region, interosseous muscle and lower extremities   Weight Loss: 15.3% x 3 months (some likely fluid related)   Fluid Accumulation: mild    Interventions/Recommendations (treatment strategy):  Collaboration of nutrition care with other providers    Nutrition Diagnosis Status:  New    Monitor and Evaluation  Food and Nutrient Intake: energy intake, enteral nutrition intake  Food and Nutrient Adminstration: enteral and parenteral nutrition administration  Anthropometric Measurements: weight, weight change  Biochemical Data, Medical Tests and Procedures: electrolyte and  renal panel, gastrointestinal profile, inflammatory profile  Nutrition-Focused Physical Findings: overall appearance     Malnutrition Assessment  Malnutrition Type: chronic illness  Orbital Region (Subcutaneous Fat Loss): mild depletion  Upper Arm Region (Subcutaneous Fat Loss): (DAVE, restrained)  Thoracic and Lumbar Region: mild depletion   Pentecostal Region (Muscle Loss): mild depletion  Clavicle Bone Region (Muscle Loss): mild depletion  Clavicle and Acromion Bone Region (Muscle Loss): mild depletion  Scapular Bone Region (Muscle Loss): (DAVE)  Dorsal Hand (Muscle Loss): mild depletion  Patellar Region (Muscle Loss): mild depletion  Anterior Thigh Region (Muscle Loss): mild depletion  Posterior Calf Region (Muscle Loss): mild depletion   Edema (Fluid Accumulation): 1-->trace     Nutrition Follow-Up  RD Follow-up?: Yes

## 2019-08-29 NOTE — PROGRESS NOTES
[facility]  Endocrinology  Progress Note    Admit Date: 8/26/2019     Reason for Consult: Management of Steroid induced Hyperglycemia     Surgical Procedure and Date:   Bilateral re-transplant of Lung    Diabetes diagnosis year: No dx of dm. Steroid induced hyperglycemia secondary to lung transplant treatment.     Home Diabetes Medications:    Januvia 100 mg daily   Novolog 4 units TIDWM    How often checking glucose at home? Not checking  BG readings on regimen: Unknown  Hypoglycemia on the regimen?  No  Missed doses on regimen?  Yes    Diabetes Complications include:     Hyperglycemia    Complicating diabetes co morbidities:   Glucocorticoid use       HPI:   Patient is a 29 y.o. female with a diagnosis of   Ms. Stacey is a 28 yo female s/p bilateral re-transplant (6/18/19) who presented to the ED in acute respiratory distress 08/26/2019. Per patient's caregiver, patient was doing well since her hospital discharge, but reports fevers (tmax 101.5) x 2 days, cough with occasional thin sputum, and severe dyspnea requiring >5L oxygen over the weekend. Patient's caregiver also reports poor appetite and PO intake over the last week. Patient has previous history of steroid sensitivity during admission in regards to glycemic control. Endocrinology consulted to manage hyperglycemia during admssion.     Lab Results   Component Value Date    HGBA1C 4.7 01/09/2019       Interval HPI:   Overnight events: Remains in SICU. NAEON. BG is within goal ranges on IV intensive insulin infusion with rates ranging from 0.2-1 u/hr overnight. Insulin infusion stopped at 1030 per protocol. Receiving Prednisone 15 mg daily.   Eating:   NPO  Nausea: No  Hypoglycemia and intervention: No  Fever: No  TPN and/or TF: No  If yes, type of TF/TPN and rate: None    BP (!) 126/94 (BP Location: Right arm, Patient Position: Lying)   Pulse (!) 121   Temp 97.5 °F (36.4 °C)   Resp (!) 25   Ht 5' (1.524 m)   Wt 50 kg (110 lb 3.7  oz)   LMP 10/02/2016 (LMP Unknown)   SpO2 97%   Breastfeeding? No   BMI 21.53 kg/m²      Labs Reviewed and Include    Recent Labs   Lab 08/29/19  0300   *   CALCIUM 8.3*   ALBUMIN 2.5*  2.5*   PROT 7.7      K 4.4   CO2 11*      BUN 19   CREATININE 1.2   ALKPHOS 174*   ALT 11   AST 16   BILITOT 0.3     Lab Results   Component Value Date    WBC 5.71 08/29/2019    HGB 8.0 (L) 08/29/2019    HCT 25.7 (L) 08/29/2019     (H) 08/29/2019     (L) 08/29/2019     Recent Labs   Lab 08/26/19  0847   TSH 1.120     Lab Results   Component Value Date    HGBA1C 4.7 01/09/2019       Nutritional status:   Body mass index is 21.53 kg/m².  Lab Results   Component Value Date    ALBUMIN 2.5 (L) 08/29/2019    ALBUMIN 2.5 (L) 08/29/2019    ALBUMIN 2.4 (L) 08/28/2019     Lab Results   Component Value Date    PREALBUMIN 15 (L) 05/29/2014    PREALBUMIN 11 (L) 05/09/2014    PREALBUMIN 18 (L) 03/19/2014       Estimated Creatinine Clearance: 49.2 mL/min (based on SCr of 1.2 mg/dL).    Accu-Checks  Recent Labs     08/28/19  1413 08/28/19  1556 08/28/19  1753 08/28/19  2002 08/28/19  2151 08/29/19  0029 08/29/19  0311 08/29/19  0418 08/29/19  0613 08/29/19  0839   POCTGLUCOSE 210* 201* 245* 221* 235* 209* 166* 164* 129* 148*       Current Medications and/or Treatments Impacting Glycemic Control  Immunotherapy:    Immunosuppressants     None        Steroids:   Hormones (From admission, onward)    Start     Stop Route Frequency Ordered    08/27/19 0900  predniSONE tablet 15 mg      -- OG Daily 08/26/19 1106    08/26/19 1031  vasopressin (PITRESSIN) 0.2 Units/mL in dextrose 5 % 100 mL infusion      -- IV Continuous 08/26/19 1031        Pressors:    Autonomic Drugs (From admission, onward)    Start     Stop Route Frequency Ordered    08/26/19 1145  norepinephrine 4 mg in dextrose 5% 250 mL infusion (premix) (titrating)     Question Answer Comment   Titrate by: (in mcg/kg/min) 0.05    Titrate interval: (in minutes) 5     Titrate to maintain: (MAP or SBP) MAP    Greater than: (in mmHg) 65    Maximum dose: (in mcg/kg/min) 3        -- IV Continuous 08/26/19 1042        Hyperglycemia/Diabetes Medications:   Antihyperglycemics (From admission, onward)    Start     Stop Route Frequency Ordered    08/26/19 1549  insulin regular (Humulin R) 100 Units in sodium chloride 0.9% 100 mL infusion     Question:  Insulin Rate Adjustment (DO NOT MODIFY ANSWER)  Answer:  [email or web address]    -- IV Continuous 08/26/19 1549    08/26/19 1515  insulin aspart U-100 (NovoLOG) 100 unit/mL (3 mL) pen     Note to Pharmacy:  Created by cabinet override    08/27 0329   08/26/19 1515          ASSESSMENT and PLAN    * Septic shock  Infection may elevate BG readings  Managed per primary team  Avoid hypoglycemia        Acute hyperglycemia  BG goal 140 - 180   BG below goal ranges this morning and IV insulin stopped. Tube feedings ordered this afternoon/ BG trending back up.     Discontinue IV insulin infusion (titrated off this morning)   Start transition IV insulin infusion at 0.6 u/hr with stepdown parameters (0.5 u/kg weight based dosing)   Low dose correction scale   BG monitoring q 4 hrs while on TFs    Discharge planning: TBD        LISBETH (acute kidney injury)  Caution with insulin stacking        On enteral nutrition  Tube feedings ordered this afternoon. May increase BG readings and insulin requirements.           Argentina Stevens NP  Endocrinology  [facility]

## 2019-08-29 NOTE — PROGRESS NOTES
[facility]  Nephrology  Progress Note    Patient Name: Juanita Ibarra  MRN: [id number]  Admission Date: 8/26/2019  Hospital Length of Stay: 3 days  Attending Provider: Jake Alvarez MD   Primary Care Physician: Primary Doctor No  Principal Problem:<principal problem not specified>    Subjective:     HPI: A 30 y/o female s/p bilateral re-transplant (6/18/19) secondary to CF, who presented to the ED in acute respiratory distress. Patient's family member reports that she had and episode of fever 2 days ago accompanied by productive cough requiring increasing at home oxygen to ~5L. Also, caregiver reports poor appetite and PO intake over the last week. Upon arrival to ED, oxygen saturations were in mid 70s while on NRB and patient was placed on BiPAP which was transitioned to 25L comfort flow at 100% FiO2. On arrival to ED and worsening clinical status patient was transferred to the ICU, intubated, and started on Zerbaxa/Vancomycin.  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    Nephrology consulted for evaluation of LISBETH and Metabolic acidosis.    Interval History: Patient evaluated bedside, stable vital signs but continues on vasopressors, currently on SLED.  During the night had to be switched to SLED from SCUF for worsening acidosis.    Review of patient's allergies indicates:   Allergen Reactions    Albuterol Palpitations    Colistin Anaphylaxis    Vancomycin analogues      Infusion reaction that does not resolve with slowing  Pt. States she can tolerate it when given with 50 mg Benadryl and ran over 3 hours    Neupogen [filgrastim] Other (See Comments)     Ostealgia after five daily doses of 300 mcg.      Bactrim [sulfamethoxazole-trimethoprim] Hives    Ceftazidime Hives     Pt stated can tolerate cefapine not ceftazidime     Ceftazidime     Dronabinol Other (See Comments)     Mental changes/hallucinations    Haldol [haloperidol lactate] Other (See Comments)     Seizure like activity    Nsaids (non-steroidal anti-inflammatory drug)      Cannot have due to lung transplant    Adhesive Rash     Cloth tape- please use tegaderm or paper tape    Aztreonam Rash    Ciprofloxacin Nausea And Vomiting     Projectile N/V, per patient.  Unwilling to retry therapy.     Current Facility-Administered Medications   Medication Frequency    0.9%  NaCl infusion (CRRT USE ONLY) Continuous    chlorhexidine 0.12 % solution 15 mL BID    dexmedetomidine (PRECEDEX) 400mcg/100mL 0.9% NaCL infusion Continuous    dextrose 10% (D10W) Bolus PRN    dextrose 10% (D10W) Bolus PRN    diphenhydrAMINE 12.5 mg/5 mL elixir 25 mg Daily PRN    fentaNYL 2500 mcg in 0.9% sodium chloride 250 mL infusion premix (titrating) Continuous    heparin (porcine) injection 5,000 Units Q12H    insulin regular (Humulin R) 100 Units in sodium chloride 0.9% 100 mL infusion Continuous    levalbuterol nebulizer solution 1.25 mg Q8H    LORazepam tablet 1 mg Q8H PRN    metoprolol (LOPRESSOR) 5 mg/5 mL injection     metoprolol injection 10 mg Q5 Min PRN    micafungin 100 mg in sodium chloride 0.9 % 100 mL IVPB (ready to mix system) Q24H    norepinephrine 4 mg in dextrose 5% 250 mL infusion (premix) (titrating) Continuous    ondansetron injection 8 mg Q6H PRN    predniSONE tablet 15 mg Daily    propofol (DIPRIVAN) 10 mg/mL infusion Continuous    QUEtiapine tablet 25 mg Daily    QUEtiapine tablet 50 mg QHS    sodium chloride 0.9% bolus 1,000 mL Once    topiramate tablet 25 mg BID    vasopressin (PITRESSIN) 0.2 Units/mL in dextrose 5 % 100 mL infusion Continuous    voriconazole tablet 200 mg BID       Objective:     Vital Signs (Most Recent):  Temp: 97.5 °F (36.4 °C) (08/29/19 0746)  Pulse: (!) 119 (08/29/19 0930)  Resp: (!) 25 (08/29/19 0746)  BP: (!) 126/94 (08/29/19  0800)  SpO2: 97 % (08/29/19 0930)  O2 Device (Oxygen Therapy): ventilator (08/29/19 0900) Vital Signs (24h Range):  Temp:  [87.4 °F (30.8 °C)-98.6 °F (37 °C)] 97.5 °F (36.4 °C)  Pulse:  [] 119  Resp:  [15-25] 25  SpO2:  [94 %-100 %] 97 %  BP: ()/(61-94) 126/94  Arterial Line BP: ()/(48-83) 90/51     Weight: 50 kg (110 lb 3.7 oz) (08/29/19 0500)  Body mass index is 21.53 kg/m².  Body surface area is 1.45 meters squared.    I/O last 3 completed shifts:  In: 03331 [I.V.:9792; NG/GT:155; IV Piggyback:100]  Out: 00041 [Urine:5; Drains:275; Other:35242]    Physical Exam   Constitutional: She appears well-developed. She is sedated and intubated.   Sedated on mechanical ventilation   HENT:   Head: Normocephalic and atraumatic.   Eyes: Right eye exhibits no discharge. Left eye exhibits no discharge. No scleral icterus.   Neck: Neck supple.   Cardiovascular: Normal rate and regular rhythm.   Pulmonary/Chest: She is intubated. No respiratory distress. She has no wheezes. She has rales.   Transmitted ventilator sounds   Abdominal: Soft. Bowel sounds are normal.   Musculoskeletal: She exhibits edema. She exhibits no tenderness or deformity.   Skin: Skin is warm. There is pallor.   Nursing note and vitals reviewed.      Significant Labs:  CBC:   Recent Labs   Lab 08/29/19  0300   WBC 5.71   RBC 2.45*   HGB 8.0*   HCT 25.7*   *   *   MCH 32.7*   MCHC 31.1*     CMP:   Recent Labs   Lab 08/29/19  0300   *   CALCIUM 8.3*   ALBUMIN 2.5*  2.5*   PROT 7.7      K 4.4   CO2 11*      BUN 19   CREATININE 1.2   ALKPHOS 174*   ALT 11   AST 16   BILITOT 0.3     All labs within the past 24 hours have been reviewed.     Significant Imaging:  CXR personally reviewed.    Assessment/Plan:     LISBETH (acute kidney injury)  Ms. 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 Her baseline SCr in the past year  has been around ~ 1.0. LISBETH likely secondary to ATN caused by hypotension/sepsis in the setting of severe metabolic acidosis.    Recommendations:    - Will continue SLED for metabolic clearance and volume management, target -350 ml/hr as tolerated.   - CVP this AM 20 with signs of moderate edema on CXR per read. Patient remains intubated, FiO2 remains at 40%. Still not making adequate urine.    - Strict I/O and chart  - Avoid nephrotoxic medications, NSAIDs, IV contrast, etc.  - Medication doses adjusted to GFR  - Maintain MAP > 65  - Will follow closely      Septic shock  - Being managed by primary team     S/P lung transplant  Managed by primary team        Thank you for your consult. I will follow-up with patient. Please contact us if you have any additional questions.    Tutu Faith MD  Nephrology  [facility]      ATTENDING PHYSICIAN ATTESTATION  I have personally assessed and examined the patient. I thoroughly reviewed the demographic, clinical, laboratorial and imaging information available in medical records. I agree with the assessment and recommendations provided by the subspecialty resident. Dr. Medrano was under my supervision.     DIALYSIS NOTE  Patient evaluated while undergoing Slow Low Efficiency Dialysis (SLED) indicated for LISBETH and hemodynamic instability. No complications, tolerating session with current UFR. Will continue current support.

## 2019-08-29 NOTE — ASSESSMENT & PLAN NOTE
Multifactorial given staph bacteremia and PNA. History of recurrent  pseudomonal infections and MRSA. Continue Vancomycin for MRSA from BAL and blood cultures. Maintain MAPs >65. Will follow up on cultures and adjust therapy as needed. Will likely discontinue Zerbaxa once cultures finalized.

## 2019-08-29 NOTE — PROGRESS NOTES
Nephrology called per SICU charge regarding ABG results and persistent hypothermia.  New orders to change CRRT to SLED and increase Bicarb.  Will contact dialysis to implement orders and continue to monitor.

## 2019-08-29 NOTE — ASSESSMENT & PLAN NOTE
Continue ventilation at current settings. Will follow up on BAL from 8/26 bronchoscopy. Daily ABG and CXR. Not appropriate for extubation today.

## 2019-08-29 NOTE — SUBJECTIVE & OBJECTIVE
Interval History: Weaning from pressors. Remains alert on vent. Repeat cx NGTD so far.    Review of Systems   Unable to perform ROS: Intubated     Objective:     Vital Signs (Most Recent):  Temp: 99.4 °F (37.4 °C) (08/29/19 1100)  Pulse: (!) 122 (08/29/19 1723)  Resp: (!) 30 (08/29/19 1609)  BP: (!) 126/94 (08/29/19 0800)  SpO2: 99 % (08/29/19 1723) Vital Signs (24h Range):  Temp:  [87.4 °F (30.8 °C)-99.4 °F (37.4 °C)] 99.4 °F (37.4 °C)  Pulse:  [] 122  Resp:  [24-30] 30  SpO2:  [94 %-100 %] 99 %  BP: ()/(62-94) 126/94  Arterial Line BP: ()/(46-78) 112/54     Weight: 50 kg (110 lb 3.7 oz)  Body mass index is 21.53 kg/m².    Estimated Creatinine Clearance: 98.5 mL/min (based on SCr of 0.6 mg/dL).    Physical Exam   Constitutional: She appears well-developed and well-nourished. She has a sickly appearance. No distress. She is intubated.   HENT:   Head: Normocephalic.   Nose: Nose normal.   Eyes: Conjunctivae are normal. No scleral icterus.   Neck: Normal range of motion. Neck supple. No JVD present. No tracheal deviation present.   Cardiovascular: Regular rhythm and normal heart sounds. Tachycardia present. Exam reveals no gallop and no friction rub.   No murmur heard.  Pulmonary/Chest: She is intubated. No respiratory distress. She has decreased breath sounds in the right lower field. She has no wheezes. She has rhonchi in the right upper field, the right middle field, the left upper field and the left middle field. She has no rales.   Mechanical BS bilaterally   Abdominal: Soft. Bowel sounds are normal. She exhibits no distension. There is no tenderness.   Genitourinary:   Genitourinary Comments: saleh   Musculoskeletal: Normal range of motion. She exhibits no edema, tenderness or deformity.   Neurological:   Follows commands and moves all 4 extremities   Skin: Skin is warm and dry. No erythema. There is pallor.   Nursing note and vitals reviewed.      Significant Labs:   CBC:   Recent Labs   Lab  08/28/19  0415 08/29/19  0300   WBC 4.67 5.71   HGB 8.1* 8.0*   HCT 26.2* 25.7*   * 124*     CMP:   Recent Labs   Lab 08/28/19 0415 08/28/19  2200 08/29/19  0300 08/29/19  1430      < > 135* 136 136   K 4.9   < > 3.9 4.4 4.3      < > 107 108 103   CO2 21*   < > 11* 11* 17*   *   < > 233* 161* 149*   BUN 4*   < > 16 19 5*   CREATININE 0.6   < > 1.0 1.2 0.6   CALCIUM 8.8   < > 8.4* 8.3* 8.5*   PROT 8.6*  --   --  7.7  --    ALBUMIN 2.5*  2.5*   < > 2.4* 2.5*  2.5* 2.4*   BILITOT 0.4  --   --  0.3  --    ALKPHOS 173*  --   --  174*  --    AST 15  --   --  16  --    ALT 10  --   --  11  --    ANIONGAP 14   < > 17* 17* 16   EGFRNONAA >60.0   < > >60.0 >60.0 >60.0    < > = values in this interval not displayed.       Significant Imaging: I have reviewed all pertinent imaging results/findings within the past 24 hours.

## 2019-08-29 NOTE — SUBJECTIVE & OBJECTIVE
Interval History: Patient evaluated bedside, stable vital signs but continues on vasopressors, currently on SLED.  During the night had to be switched to SLED from SCUF for worsening acidosis.    Review of patient's allergies indicates:   Allergen Reactions    Albuterol Palpitations    Colistin Anaphylaxis    Vancomycin analogues      Infusion reaction that does not resolve with slowing  Pt. States she can tolerate it when given with 50 mg Benadryl and ran over 3 hours    Neupogen [filgrastim] Other (See Comments)     Ostealgia after five daily doses of 300 mcg.      Bactrim [sulfamethoxazole-trimethoprim] Hives    Ceftazidime Hives     Pt stated can tolerate cefapine not ceftazidime    Ceftazidime     Dronabinol Other (See Comments)     Mental changes/hallucinations    Haldol [haloperidol lactate] Other (See Comments)     Seizure like activity    Nsaids (non-steroidal anti-inflammatory drug)      Cannot have due to lung transplant    Adhesive Rash     Cloth tape- please use tegaderm or paper tape    Aztreonam Rash    Ciprofloxacin Nausea And Vomiting     Projectile N/V, per patient.  Unwilling to retry therapy.     Current Facility-Administered Medications   Medication Frequency    0.9%  NaCl infusion (CRRT USE ONLY) Continuous    chlorhexidine 0.12 % solution 15 mL BID    dexmedetomidine (PRECEDEX) 400mcg/100mL 0.9% NaCL infusion Continuous    dextrose 10% (D10W) Bolus PRN    dextrose 10% (D10W) Bolus PRN    diphenhydrAMINE 12.5 mg/5 mL elixir 25 mg Daily PRN    fentaNYL 2500 mcg in 0.9% sodium chloride 250 mL infusion premix (titrating) Continuous    heparin (porcine) injection 5,000 Units Q12H    insulin regular (Humulin R) 100 Units in sodium chloride 0.9% 100 mL infusion Continuous    levalbuterol nebulizer solution 1.25 mg Q8H    LORazepam tablet 1 mg Q8H PRN    metoprolol (LOPRESSOR) 5 mg/5 mL injection     metoprolol injection 10 mg Q5 Min PRN    micafungin 100 mg in sodium  chloride 0.9 % 100 mL IVPB (ready to mix system) Q24H    norepinephrine 4 mg in dextrose 5% 250 mL infusion (premix) (titrating) Continuous    ondansetron injection 8 mg Q6H PRN    predniSONE tablet 15 mg Daily    propofol (DIPRIVAN) 10 mg/mL infusion Continuous    QUEtiapine tablet 25 mg Daily    QUEtiapine tablet 50 mg QHS    sodium chloride 0.9% bolus 1,000 mL Once    topiramate tablet 25 mg BID    vasopressin (PITRESSIN) 0.2 Units/mL in dextrose 5 % 100 mL infusion Continuous    voriconazole tablet 200 mg BID       Objective:     Vital Signs (Most Recent):  Temp: 97.5 °F (36.4 °C) (08/29/19 0746)  Pulse: (!) 119 (08/29/19 0930)  Resp: (!) 25 (08/29/19 0746)  BP: (!) 126/94 (08/29/19 0800)  SpO2: 97 % (08/29/19 0930)  O2 Device (Oxygen Therapy): ventilator (08/29/19 0900) Vital Signs (24h Range):  Temp:  [87.4 °F (30.8 °C)-98.6 °F (37 °C)] 97.5 °F (36.4 °C)  Pulse:  [] 119  Resp:  [15-25] 25  SpO2:  [94 %-100 %] 97 %  BP: ()/(61-94) 126/94  Arterial Line BP: ()/(48-83) 90/51     Weight: 50 kg (110 lb 3.7 oz) (08/29/19 0500)  Body mass index is 21.53 kg/m².  Body surface area is 1.45 meters squared.    I/O last 3 completed shifts:  In: 08765 [I.V.:9792; NG/GT:155; IV Piggyback:100]  Out: 56036 [Urine:5; Drains:275; Other:63226]    Physical Exam   Constitutional: She appears well-developed. She is sedated and intubated.   Sedated on mechanical ventilation   HENT:   Head: Normocephalic and atraumatic.   Eyes: Right eye exhibits no discharge. Left eye exhibits no discharge. No scleral icterus.   Neck: Neck supple.   Cardiovascular: Normal rate and regular rhythm.   Pulmonary/Chest: She is intubated. No respiratory distress. She has no wheezes. She has rales.   Transmitted ventilator sounds   Abdominal: Soft. Bowel sounds are normal.   Musculoskeletal: She exhibits edema. She exhibits no tenderness or deformity.   Skin: Skin is warm. There is pallor.   Nursing note and vitals  reviewed.      Significant Labs:  CBC:   Recent Labs   Lab 08/29/19  0300   WBC 5.71   RBC 2.45*   HGB 8.0*   HCT 25.7*   *   *   MCH 32.7*   MCHC 31.1*     CMP:   Recent Labs   Lab 08/29/19  0300   *   CALCIUM 8.3*   ALBUMIN 2.5*  2.5*   PROT 7.7      K 4.4   CO2 11*      BUN 19   CREATININE 1.2   ALKPHOS 174*   ALT 11   AST 16   BILITOT 0.3     All labs within the past 24 hours have been reviewed.     Significant Imaging:  CXR personally reviewed.

## 2019-08-29 NOTE — ASSESSMENT & PLAN NOTE
BG goal 140 - 180   BG below goal ranges this morning and IV insulin stopped. Tube feedings ordered this afternoon/ BG trending back up.     Discontinue IV insulin infusion (titrated off this morning)   Start transition IV insulin infusion at 0.6 u/hr with stepdown parameters (0.5 u/kg weight based dosing)   Low dose correction scale   BG monitoring q 4 hrs while on TFs    Discharge planning: TBD

## 2019-08-29 NOTE — ASSESSMENT & PLAN NOTE
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 She is weaning from pressors on vancomycin and ongoing voriconazole/micafungin after port was removed. Notably U/S shows R jugular DVT adjacent to R SC port.    - would continue vancomycin (goal trough 15-20) for MRSA septicemia and pneumonia  - would continue voriconazole for prior invasive aspergillosis; await BAL antigen from recent bronch; repeat level  - may continue micafungin while awaiting yeast ID but likely commensal Candida that doesn't require additional coverage  - remainder of prophylaxis per protocol  - TTE negative -- consider SUZY while patient is still intubated  - contact precautions for MRSA

## 2019-08-29 NOTE — PROGRESS NOTES
CRRT rinsed back due to patient's hypothermia. Patient currently on SCUF. Will start fluid warmer with CRRT.

## 2019-08-29 NOTE — ASSESSMENT & PLAN NOTE
Blood cultures from 8/26 with MRSA. Right chest port removed. Repeat blood cultures NGTD. ID following, appreciate recs. Continue vancomycin.

## 2019-08-29 NOTE — PROGRESS NOTES
[facility]  Infectious Disease  Progress Note    Patient Name: Juanita Ibarra  MRN: [id number]  Admission Date: 8/26/2019  Length of Stay: 3 days  Attending Physician: Jake Alvarez MD  Primary Care Provider: Primary Doctor No    Isolation Status: Contact and Neutropenic  Assessment/Plan:      * Septic shock  30yo woman w/a history of CF (c/b pancreatic insufficiency, sinus disease, MRSA/Pseudomonas colonization c/b numerous antibiotics allergies, and subsequent COPD/ESLD; s/p BOLT 6/12/2014, CMV D+/R-, simulect induction, on maintenance tacro/MMF/pred; c/b multiple episodes of MRSA/Pseudomonas pneumonia/sinusitis, C.glabrata early post-operative colonization 7/2014, A1 rejection 8/2014 s/p pulse SM, prior CMV reactivations, and subsequent grade 3 CARLOS EDUARDO; s/p redo BOLT 6/18/2019, CMV D-/R+, basiliximab induction, on maintenance tacro/MMF/pred; c/b  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 She is weaning from pressors on vancomycin and ongoing voriconazole/micafungin after port was removed. Notably U/S shows R jugular DVT adjacent to R SC port.    - would continue vancomycin (goal trough 15-20) for MRSA septicemia and pneumonia  - would continue voriconazole for prior invasive aspergillosis; await BAL antigen from recent bronch; repeat level  - may continue micafungin while awaiting yeast ID but likely commensal Candida that doesn't require additional coverage  - remainder of prophylaxis per protocol  - TTE negative -- consider SUZY while patient is still intubated  - contact precautions for MRSA          Anticipated Disposition: pending  improvement    Thank you for your consult. I will follow-up with patient. Please contact us if you have any additional questions.     Juanita Francis MD  Transplant ID Attending  [phone]    Juanita Francis MD  Infectious Disease  [facility]    Subjective:     Principal Problem:Septic shock    HPI: No notes on file  Interval History: Weaning from pressors. Remains alert on vent. Repeat cx NGTD so far.    Review of Systems   Unable to perform ROS: Intubated     Objective:     Vital Signs (Most Recent):  Temp: 99.4 °F (37.4 °C) (08/29/19 1100)  Pulse: (!) 122 (08/29/19 1723)  Resp: (!) 30 (08/29/19 1609)  BP: (!) 126/94 (08/29/19 0800)  SpO2: 99 % (08/29/19 1723) Vital Signs (24h Range):  Temp:  [87.4 °F (30.8 °C)-99.4 °F (37.4 °C)] 99.4 °F (37.4 °C)  Pulse:  [] 122  Resp:  [24-30] 30  SpO2:  [94 %-100 %] 99 %  BP: ()/(62-94) 126/94  Arterial Line BP: ()/(46-78) 112/54     Weight: 50 kg (110 lb 3.7 oz)  Body mass index is 21.53 kg/m².    Estimated Creatinine Clearance: 98.5 mL/min (based on SCr of 0.6 mg/dL).    Physical Exam   Constitutional: She appears well-developed and well-nourished. She has a sickly appearance. No distress. She is intubated.   HENT:   Head: Normocephalic.   Nose: Nose normal.   Eyes: Conjunctivae are normal. No scleral icterus.   Neck: Normal range of motion. Neck supple. No JVD present. No tracheal deviation present.   Cardiovascular: Regular rhythm and normal heart sounds. Tachycardia present. Exam reveals no gallop and no friction rub.   No murmur heard.  Pulmonary/Chest: She is intubated. No respiratory distress. She has decreased breath sounds in the right lower field. She has no wheezes. She has rhonchi in the right upper field, the right middle field, the left upper field and the left middle field. She has no rales.   Mechanical BS bilaterally   Abdominal: Soft. Bowel sounds are normal. She exhibits no distension. There is no tenderness.    Genitourinary:   Genitourinary Comments: saleh   Musculoskeletal: Normal range of motion. She exhibits no edema, tenderness or deformity.   Neurological:   Follows commands and moves all 4 extremities   Skin: Skin is warm and dry. No erythema. There is pallor.   Nursing note and vitals reviewed.      Significant Labs:   CBC:   Recent Labs   Lab 08/28/19  0415 08/29/19  0300   WBC 4.67 5.71   HGB 8.1* 8.0*   HCT 26.2* 25.7*   * 124*     CMP:   Recent Labs   Lab 08/28/19  0415  08/28/19  2200 08/29/19  0300 08/29/19  1430      < > 135* 136 136   K 4.9   < > 3.9 4.4 4.3      < > 107 108 103   CO2 21*   < > 11* 11* 17*   *   < > 233* 161* 149*   BUN 4*   < > 16 19 5*   CREATININE 0.6   < > 1.0 1.2 0.6   CALCIUM 8.8   < > 8.4* 8.3* 8.5*   PROT 8.6*  --   --  7.7  --    ALBUMIN 2.5*  2.5*   < > 2.4* 2.5*  2.5* 2.4*   BILITOT 0.4  --   --  0.3  --    ALKPHOS 173*  --   --  174*  --    AST 15  --   --  16  --    ALT 10  --   --  11  --    ANIONGAP 14   < > 17* 17* 16   EGFRNONAA >60.0   < > >60.0 >60.0 >60.0    < > = values in this interval not displayed.       Significant Imaging: I have reviewed all pertinent imaging results/findings within the past 24 hours.

## 2019-08-29 NOTE — SUBJECTIVE & OBJECTIVE
Subjective:     Interval History: SLED switched to SCUF overnight for worsening metabolic acidosis. More agitation and episodes of SVT this morning which resolved with sedation and prn lopressor. Remains anuric and on PCV. Intermittently requiring vaso/levo.     Continuous Infusions:   sodium chloride 0.9% 200 mL/hr at 08/29/19 0700    dexmedetomidine (PRECEDEX) infusion 1.4 mcg/kg/hr (08/29/19 0800)    fentanyl 250 mcg/hr (08/29/19 1000)    insulin (HUMAN R) infusion (adults) 0.2 Units/hr (08/29/19 0800)    norepinephrine bitartrate-D5W 0.04 mcg/kg/min (08/29/19 1000)    propofol 50 mcg/kg/min (08/29/19 1000)    vasopressin (PITRESSIN) infusion 0.04 Units/min (08/29/19 1032)     Scheduled Meds:   chlorhexidine  15 mL Mouth/Throat BID    heparin (porcine)  5,000 Units Subcutaneous Q12H    levalbuterol  1.25 mg Nebulization Q8H    metoprolol        micafungin (MYCAMINE) IVPB  100 mg Intravenous Q24H    predniSONE  15 mg Per OG tube Daily    QUEtiapine  25 mg Per OG tube Daily    QUEtiapine  50 mg Per OG tube QHS    sodium chloride 0.9%  1,000 mL Intravenous Once    topiramate  25 mg Per OG tube BID    voriconazole  200 mg Per OG tube BID     PRN Meds:Dextrose 10% Bolus, Dextrose 10% Bolus, diphenhydrAMINE, LORazepam, metoprolol, ondansetron    Review of patient's allergies indicates:   Allergen Reactions    Albuterol Palpitations    Colistin Anaphylaxis    Vancomycin analogues      Infusion reaction that does not resolve with slowing  Pt. States she can tolerate it when given with 50 mg Benadryl and ran over 3 hours    Neupogen [filgrastim] Other (See Comments)     Ostealgia after five daily doses of 300 mcg.      Bactrim [sulfamethoxazole-trimethoprim] Hives    Ceftazidime Hives     Pt stated can tolerate cefapine not ceftazidime    Ceftazidime     Dronabinol Other (See Comments)     Mental changes/hallucinations    Haldol [haloperidol lactate] Other (See Comments)     Seizure like  activity    Nsaids (non-steroidal anti-inflammatory drug)      Cannot have due to lung transplant    Adhesive Rash     Cloth tape- please use tegaderm or paper tape    Aztreonam Rash    Ciprofloxacin Nausea And Vomiting     Projectile N/V, per patient.  Unwilling to retry therapy.       Review of Systems   Unable to perform ROS: Intubated     Objective:   Physical Exam   Constitutional: She appears well-developed and well-nourished. She has a sickly appearance. No distress. She is intubated.   HENT:   Head: Normocephalic.   Nose: Nose normal.   Eyes: Conjunctivae are normal. No scleral icterus.   Neck: Normal range of motion. Neck supple. No JVD present. No tracheal deviation present.   Cardiovascular: Regular rhythm and normal heart sounds. Tachycardia present. Exam reveals no gallop and no friction rub.   No murmur heard.  Pulmonary/Chest: She is intubated. No respiratory distress. She has decreased breath sounds in the right lower field. She has no wheezes. She has rhonchi in the right upper field, the right middle field, the left upper field and the left middle field. She has no rales.   Mechanical BS bilaterally   Abdominal: Soft. Bowel sounds are normal. She exhibits no distension. There is no tenderness.   Genitourinary:   Genitourinary Comments: saleh   Musculoskeletal: Normal range of motion. She exhibits no edema, tenderness or deformity.   Neurological:   Follows commands and moves all 4 extremities   Skin: Skin is warm and dry. No erythema. There is pallor.   Nursing note and vitals reviewed.        Vital Signs (Most Recent):  Temp: 97.5 °F (36.4 °C) (08/29/19 0746)  Pulse: (!) 115 (08/29/19 1015)  Resp: (!) 25 (08/29/19 0746)  BP: (!) 126/94 (08/29/19 0800)  SpO2: 98 % (08/29/19 1015) Vital Signs (24h Range):  Temp:  [87.4 °F (30.8 °C)-98.6 °F (37 °C)] 97.5 °F (36.4 °C)  Pulse:  [] 115  Resp:  [15-25] 25  SpO2:  [94 %-100 %] 98 %  BP: ()/(61-94) 126/94  Arterial Line BP: ()/(48-83)  93/54     Weight: 50 kg (110 lb 3.7 oz)  Body mass index is 21.53 kg/m².      Intake/Output Summary (Last 24 hours) at 8/29/2019 1123  Last data filed at 8/29/2019 1000  Gross per 24 hour   Intake 6667.03 ml   Output 7316 ml   Net -648.97 ml       Ventilator Data:     Vent Mode: A/C  Oxygen Concentration (%):  [38-43] 39  Resp Rate Total:  [8.8 br/min-31 br/min] 24 br/min  Vt Set:  [375 mL] 375 mL  PEEP/CPAP:  [5 cmH20] 5 cmH20  Pressure Support:  [14 cmH20] 14 cmH20  Mean Airway Pressure:  [7.4 ajE86-47 cmH20] 12 cmH20    Hemodynamic Parameters:       Lines/Drains:       Hemodialysis Catheter 08/26/19 2225 right femoral (Active)   Site Assessment Clean;Dry;Intact 8/27/2019  3:00 AM   Status Accessed 8/27/2019  3:00 AM   Flows Good 8/27/2019  3:00 AM   Dressing Intervention New dressing 8/26/2019 10:30 PM   Dressing Status Biopatch in place;Clean;Dry;Intact 8/27/2019  3:00 AM   Dressing Change Due 08/30/19 8/27/2019  3:00 AM   Verification by X-ray Other (Comment) 8/26/2019 10:30 PM   Dressing Occlusive 8/27/2019  3:00 AM   Daily Line Review Performed 8/27/2019  3:00 AM   Number of days: 0            Port A Cath Single Lumen 08/26/19 right subclavian (Active)   Dressing Type Transparent 8/27/2019  3:00 AM   Dressing Status Clean;Dry;Intact 8/27/2019  3:00 AM   Dressing Intervention Dressing reinforced 8/26/2019  3:00 PM   Dressing Change Due 09/02/19 8/27/2019  3:00 AM   Line Status Infusing 8/27/2019  3:00 AM   Daily Line Review Performed 8/27/2019  3:00 AM   Number of days: 1            Percutaneous Central Line Insertion/Assessment - triple lumen  08/26/19 1135 left internal jugular (Active)   Dressing biopatch in place;dressing dry and intact 8/27/2019  3:00 AM   Securement secured w/ sutures 8/27/2019  3:00 AM   Additional Site Signs no erythema;no warmth;no edema;no pain;no palpable cord;no streak formation;no drainage 8/27/2019  3:00 AM   Distal Patency/Care infusing 8/27/2019  3:00 AM   Medial Patency/Care  infusing 8/27/2019  3:00 AM   Proximal Patency/Care infusing 8/27/2019  3:00 AM   Waveform normal 8/27/2019  3:00 AM   Line Interventions line leveled/zeroed 8/27/2019  3:00 AM   Dressing Change Due 09/02/19 8/27/2019  3:00 AM   Daily Line Review Performed 8/27/2019  3:00 AM   Number of days: 0            Peripheral IV - Single Lumen 08/26/19 22 G Right Hand (Active)   Site Assessment Clean;Dry;Intact 8/27/2019  3:00 AM   Line Status Infusing 8/27/2019  3:00 AM   Dressing Status Clean;Dry;Intact 8/27/2019  3:00 AM   Dressing Intervention Dressing reinforced 8/26/2019  3:00 PM   Dressing Change Due 08/30/19 8/27/2019  3:00 AM   Site Change Due 08/30/19 8/26/2019  7:00 PM   Reason Not Rotated Not due 8/27/2019  3:00 AM   Number of days: 1            Peripheral IV - Single Lumen 08/26/19 1119 22 G Left;Posterior Hand (Active)   Site Assessment Clean;Dry;Intact 8/27/2019  3:00 AM   Line Status Infusing 8/27/2019  3:00 AM   Dressing Status Clean;Dry;Intact 8/27/2019  3:00 AM   Dressing Intervention Dressing reinforced 8/26/2019  3:00 PM   Dressing Change Due 08/30/19 8/27/2019  3:00 AM   Site Change Due 08/30/19 8/26/2019  7:00 PM   Reason Not Rotated Not due 8/27/2019  3:00 AM   Number of days: 0            Peripheral IV - Single Lumen 08/26/19 1119 20 G Anterior;Right Forearm (Active)   Site Assessment Clean;Dry;Intact 8/27/2019  3:00 AM   Line Status Infusing 8/27/2019  3:00 AM   Dressing Status Clean;Dry;Intact 8/27/2019  3:00 AM   Dressing Intervention Dressing reinforced 8/26/2019  3:00 PM   Dressing Change Due 08/30/19 8/27/2019  3:00 AM   Site Change Due 08/30/19 8/26/2019  7:00 PM   Reason Not Rotated Not due 8/27/2019  3:00 AM   Number of days: 0            Arterial Line 08/26/19 1700 Right Femoral (Active)   Site Assessment Clean;Dry;Intact 8/27/2019  3:00 AM   Line Status Pulsatile blood flow 8/27/2019  3:00 AM   Art Line Waveform Appropriate 8/27/2019  3:00 AM   Arterial Line Interventions Zeroed and  "calibrated;Leveled;Connections checked and tightened;Flushed per protocol;Line pulled back 8/27/2019  3:00 AM   Color/Movement/Sensation Capillary refill less than 3 sec 8/27/2019  3:00 AM   Dressing Type Transparent 8/27/2019  3:00 AM   Dressing Status Clean;Dry;Intact 8/27/2019  3:00 AM   Dressing Change Due 08/30/19 8/27/2019  3:00 AM   Number of days: 0            NG/OG Tube 08/26/19 1140 orogastric 18 Fr. Center mouth (Active)   Placement Check placement verified by x-ray 8/27/2019  3:00 AM   Tolerance signs/symptoms of discomfort 8/27/2019  3:00 AM   Securement secured to commercial device 8/27/2019  3:00 AM   Clamp Status/Tolerance unclamped 8/27/2019  3:00 AM   Suction Setting/Drainage Method suction at;low;moderate 8/27/2019  3:00 AM   Insertion Site Appearance no redness, warmth, tenderness, skin breakdown, drainage 8/27/2019  3:00 AM   Drainage Green;Brown 8/27/2019  3:00 AM   Intake (mL) 60 mL 8/27/2019  9:00 AM   Number of days: 0            Urethral Catheter 08/26/19 1230 (Active)   Site Assessment Clean;Intact 8/27/2019  3:00 AM   Collection Container Urimeter 8/27/2019  3:00 AM   Securement Method secured to top of thigh w/ adhesive device 8/27/2019  3:00 AM   Catheter Care Performed no 8/27/2019  3:00 AM   Reason for Continuing Urinary Catheterization Critically ill in ICU requiring intensive monitoring 8/27/2019  3:00 AM   CAUTI Prevention Bundle StatLock in place w 1" slack;Intact seal between catheter & drainage tubing;Drainage bag off the floor;No dependent loops or kinks;Green sheeting clip in use;Drainage bag not overfilled (<2/3 full);Drainage bag below bladder 8/26/2019 11:00 PM   Output (mL) 10 mL 8/27/2019  9:00 AM   Number of days: 0       Significant Labs:  CBC:  Recent Labs   Lab 08/29/19  0300   WBC 5.71   RBC 2.45*   HGB 8.0*   HCT 25.7*   *   *   MCH 32.7*   MCHC 31.1*     BMP:  Recent Labs   Lab 08/29/19  0300      K 4.4      CO2 11*   BUN 19   CREATININE " 1.2   CALCIUM 8.3*      Tacrolimus Levels:  Recent Labs   Lab 08/29/19  0300   TACROLIMUS 5.3     Microbiology:  Microbiology Results (last 7 days)     Procedure Component Value Units Date/Time    Blood culture #1 **CANNOT BE ORDERED STAT** [[id number]]  (Abnormal) Collected:  08/26/19 0848    Order Status:  Completed Specimen:  Blood from Line, Port A Cath Updated:  08/29/19 1111     Blood Culture, Routine Gram stain lisa bottle: Gram positive cocci in clusters resembling Staph       Results called to and read back by:Monet Martienz RN 08/27/2019  06:04      Gram stain aer bottle: Gram positive cocci in clusters resembling Staph       08/28/2019  10:29      METHICILLIN RESISTANT STAPHYLOCOCCUS AUREUS  ID consult required at [facility] and [facility].  For susceptibility see order #[id number]      Blood culture #2 **CANNOT BE ORDERED STAT** [[id number]]  (Abnormal)  (Susceptibility) Collected:  08/26/19 0859    Order Status:  Completed Specimen:  Blood from Wrist, Right Updated:  08/29/19 1110     Blood Culture, Routine Gram stain aer bottle: Gram positive cocci in clusters resembling Staph       Results called to and read back by:Monet Martinez RN 08/27/2019 03:23      Gram stain lisa bottle: Gram positive cocci in clusters resembling Staph       08/27/2019  11:38      METHICILLIN RESISTANT STAPHYLOCOCCUS AUREUS  ID consult required at [facility] and [facility].      Blood culture [[id number]] Collected:  08/28/19 1250    Order Status:  Completed Specimen:  Blood from Peripheral, Hand, Right Updated:  08/28/19 2115     Blood Culture, Routine No Growth to date    Blood culture [[id number]] Collected:  08/28/19 1020    Order Status:  Completed Specimen:  Blood from Peripheral, Wrist, Right Updated:  08/28/19 1715     Blood Culture, Routine No Growth to date    Culture, Respiratory [[id number]]  (Abnormal)  (Susceptibility) Collected:  08/26/19 1441    Order Status:   Completed Specimen:  Respiratory from Bronchial Wash Updated:  08/28/19 1213     Respiratory Culture METHICILLIN RESISTANT STAPHYLOCOCCUS AUREUS  Many       Gram Stain (Respiratory) <10 epithelial cells per low power field.     Gram Stain (Respiratory) Few WBC's     Gram Stain (Respiratory) Many Gram positive cocci    Narrative:       Bronchial Wash    Fungus culture [[id number]]  (Abnormal) Collected:  08/26/19 1441    Order Status:  Completed Specimen:  Respiratory from Bronchial Wash Updated:  08/28/19 1101     Fungus (Mycology) Culture YEAST   Many  Identification pending      Narrative:       Bronchial Wash    AFB Culture & Smear [[id number]] Collected:  08/26/19 1441    Order Status:  Completed Specimen:  Respiratory from Bronchial Wash Updated:  08/27/19 2127     AFB Culture & Smear Culture in progress     AFB CULTURE STAIN No acid fast bacilli seen.    Narrative:       Bronchial Wash    Respiratory Infection Panel, Nasopharyngeal [[id number]] Collected:  08/26/19 1003    Order Status:  Completed Specimen:  Nasopharyngeal Swab Updated:  08/26/19 1344     Respiratory Infection Panel Source NP Swab     Adenovirus Not Detected     Coronavirus 229E Not Detected     Coronavirus HKU1 Not Detected     Coronavirus NL63 Not Detected     Coronavirus OC43 Not Detected     Human Metapneumovirus Not Detected     Human Rhinovirus/Enterovirus Not Detected     Influenza A (subtypes H1, H1-2009,H3) Not Detected     Influenza B Not Detected     Parainfluenza Virus 1 Not Detected     Parainfluenza Virus 2 Not Detected     Parainfluenza Virus 3 Not Detected     Parainfluenza Virus 4 Not Detected     Respiratory Syncytial Virus Not Detected     Bordetella Parapertussis (FS9472) Not Detected     Bordetella pertussis (ptxP) Not Detected     Chlamydia pneumoniae Not Detected     Mycoplasma pneumoniae Not Detected    Narrative:       For all other respiratory sources order [id number] Respiratory  Viral Panel by PCR (RVPCR)    Culture,  Respiratory  - Cystic Fibrosis [[id number]]     Order Status:  Canceled Specimen:  Respiratory           I have reviewed all pertinent labs within the past 24 hours.    Diagnostic Results:  Chest X-Ray: I personally reviewed the films and findings are:, bilateral opacities with improved aeration as compared to previous CXR. Right pleural effusion.     No Further

## 2019-08-29 NOTE — PROGRESS NOTES
MD notified of temperature of 90 down from 93 at 1900 after a warming blanket and multiple blankets were placed.  Warming packs now applied to head and collar bones.  Will check with dialysis to increase temp on CRRT.  VSS, will continue to monitor.

## 2019-08-29 NOTE — PROGRESS NOTES
MD Antonio notified of persistent hypothermia despite Behr hugger, warm packs/towels, and warm fluids.  Temp maintained 87-95 degrees.  Also notified of mild hypotension, vaso @ 0.04 units/min, Levo @ 0.01 mcg/kg/min.  Results of most recent ABG given as well including pH: 7.15 & Bicarb of 13.5.  No new orders at this time, will continue to monitor.

## 2019-08-29 NOTE — PROGRESS NOTES
[facility]  Lung Transplant  Progress Note - Critical Care    Patient Name: Juanita Ibarra  MRN: [id number]  Admission Date: 8/26/2019  Hospital Length of Stay: 3 days  Post-Operative Day: 1904 (Lung), 72 (Lung)  Attending Physician: Jake Alvarez MD  Primary Care Provider: Primary Doctor No     Subjective:     Interval History: SLED switched to SCUF overnight for worsening metabolic acidosis. More agitation and episodes of SVT this morning which resolved with sedation and prn lopressor. Remains anuric and on PCV. Intermittently requiring vaso/levo.     Continuous Infusions:   sodium chloride 0.9% 200 mL/hr at 08/29/19 0700    dexmedetomidine (PRECEDEX) infusion 1.4 mcg/kg/hr (08/29/19 0800)    fentanyl 250 mcg/hr (08/29/19 1000)    insulin (HUMAN R) infusion (adults) 0.2 Units/hr (08/29/19 0800)    norepinephrine bitartrate-D5W 0.04 mcg/kg/min (08/29/19 1000)    propofol 50 mcg/kg/min (08/29/19 1000)    vasopressin (PITRESSIN) infusion 0.04 Units/min (08/29/19 1032)     Scheduled Meds:   chlorhexidine  15 mL Mouth/Throat BID    heparin (porcine)  5,000 Units Subcutaneous Q12H    levalbuterol  1.25 mg Nebulization Q8H    metoprolol        micafungin (MYCAMINE) IVPB  100 mg Intravenous Q24H    predniSONE  15 mg Per OG tube Daily    QUEtiapine  25 mg Per OG tube Daily    QUEtiapine  50 mg Per OG tube QHS    sodium chloride 0.9%  1,000 mL Intravenous Once    topiramate  25 mg Per OG tube BID    voriconazole  200 mg Per OG tube BID     PRN Meds:Dextrose 10% Bolus, Dextrose 10% Bolus, diphenhydrAMINE, LORazepam, metoprolol, ondansetron    Review of patient's allergies indicates:   Allergen Reactions    Albuterol Palpitations    Colistin Anaphylaxis    Vancomycin analogues      Infusion reaction that does not resolve with slowing  Pt. States she can tolerate it when given with 50 mg Benadryl and ran over 3 hours    Neupogen [filgrastim] Other (See Comments)      Ostealgia after five daily doses of 300 mcg.      Bactrim [sulfamethoxazole-trimethoprim] Hives    Ceftazidime Hives     Pt stated can tolerate cefapine not ceftazidime    Ceftazidime     Dronabinol Other (See Comments)     Mental changes/hallucinations    Haldol [haloperidol lactate] Other (See Comments)     Seizure like activity    Nsaids (non-steroidal anti-inflammatory drug)      Cannot have due to lung transplant    Adhesive Rash     Cloth tape- please use tegaderm or paper tape    Aztreonam Rash    Ciprofloxacin Nausea And Vomiting     Projectile N/V, per patient.  Unwilling to retry therapy.       Review of Systems   Unable to perform ROS: Intubated     Objective:   Physical Exam   Constitutional: She appears well-developed and well-nourished. She has a sickly appearance. No distress. She is intubated.   HENT:   Head: Normocephalic.   Nose: Nose normal.   Eyes: Conjunctivae are normal. No scleral icterus.   Neck: Normal range of motion. Neck supple. No JVD present. No tracheal deviation present.   Cardiovascular: Regular rhythm and normal heart sounds. Tachycardia present. Exam reveals no gallop and no friction rub.   No murmur heard.  Pulmonary/Chest: She is intubated. No respiratory distress. She has decreased breath sounds in the right lower field. She has no wheezes. She has rhonchi in the right upper field, the right middle field, the left upper field and the left middle field. She has no rales.   Mechanical BS bilaterally   Abdominal: Soft. Bowel sounds are normal. She exhibits no distension. There is no tenderness.   Genitourinary:   Genitourinary Comments: saleh   Musculoskeletal: Normal range of motion. She exhibits no edema, tenderness or deformity.   Neurological:   Follows commands and moves all 4 extremities   Skin: Skin is warm and dry. No erythema. There is pallor.   Nursing note and vitals reviewed.        Vital Signs (Most Recent):  Temp: 97.5 °F (36.4 °C) (08/29/19 0746)  Pulse: (!)  115 (08/29/19 1015)  Resp: (!) 25 (08/29/19 0746)  BP: (!) 126/94 (08/29/19 0800)  SpO2: 98 % (08/29/19 1015) Vital Signs (24h Range):  Temp:  [87.4 °F (30.8 °C)-98.6 °F (37 °C)] 97.5 °F (36.4 °C)  Pulse:  [] 115  Resp:  [15-25] 25  SpO2:  [94 %-100 %] 98 %  BP: ()/(61-94) 126/94  Arterial Line BP: ()/(48-83) 93/54     Weight: 50 kg (110 lb 3.7 oz)  Body mass index is 21.53 kg/m².      Intake/Output Summary (Last 24 hours) at 8/29/2019 1123  Last data filed at 8/29/2019 1000  Gross per 24 hour   Intake 6667.03 ml   Output 7316 ml   Net -648.97 ml       Ventilator Data:     Vent Mode: A/C  Oxygen Concentration (%):  [38-43] 39  Resp Rate Total:  [8.8 br/min-31 br/min] 24 br/min  Vt Set:  [375 mL] 375 mL  PEEP/CPAP:  [5 cmH20] 5 cmH20  Pressure Support:  [14 cmH20] 14 cmH20  Mean Airway Pressure:  [7.4 pqQ09-57 cmH20] 12 cmH20    Hemodynamic Parameters:       Lines/Drains:       Hemodialysis Catheter 08/26/19 2225 right femoral (Active)   Site Assessment Clean;Dry;Intact 8/27/2019  3:00 AM   Status Accessed 8/27/2019  3:00 AM   Flows Good 8/27/2019  3:00 AM   Dressing Intervention New dressing 8/26/2019 10:30 PM   Dressing Status Biopatch in place;Clean;Dry;Intact 8/27/2019  3:00 AM   Dressing Change Due 08/30/19 8/27/2019  3:00 AM   Verification by X-ray Other (Comment) 8/26/2019 10:30 PM   Dressing Occlusive 8/27/2019  3:00 AM   Daily Line Review Performed 8/27/2019  3:00 AM   Number of days: 0            Port A Cath Single Lumen 08/26/19 right subclavian (Active)   Dressing Type Transparent 8/27/2019  3:00 AM   Dressing Status Clean;Dry;Intact 8/27/2019  3:00 AM   Dressing Intervention Dressing reinforced 8/26/2019  3:00 PM   Dressing Change Due 09/02/19 8/27/2019  3:00 AM   Line Status Infusing 8/27/2019  3:00 AM   Daily Line Review Performed 8/27/2019  3:00 AM   Number of days: 1            Percutaneous Central Line Insertion/Assessment - triple lumen  08/26/19 1135 left internal jugular  (Active)   Dressing biopatch in place;dressing dry and intact 8/27/2019  3:00 AM   Securement secured w/ sutures 8/27/2019  3:00 AM   Additional Site Signs no erythema;no warmth;no edema;no pain;no palpable cord;no streak formation;no drainage 8/27/2019  3:00 AM   Distal Patency/Care infusing 8/27/2019  3:00 AM   Medial Patency/Care infusing 8/27/2019  3:00 AM   Proximal Patency/Care infusing 8/27/2019  3:00 AM   Waveform normal 8/27/2019  3:00 AM   Line Interventions line leveled/zeroed 8/27/2019  3:00 AM   Dressing Change Due 09/02/19 8/27/2019  3:00 AM   Daily Line Review Performed 8/27/2019  3:00 AM   Number of days: 0            Peripheral IV - Single Lumen 08/26/19 22 G Right Hand (Active)   Site Assessment Clean;Dry;Intact 8/27/2019  3:00 AM   Line Status Infusing 8/27/2019  3:00 AM   Dressing Status Clean;Dry;Intact 8/27/2019  3:00 AM   Dressing Intervention Dressing reinforced 8/26/2019  3:00 PM   Dressing Change Due 08/30/19 8/27/2019  3:00 AM   Site Change Due 08/30/19 8/26/2019  7:00 PM   Reason Not Rotated Not due 8/27/2019  3:00 AM   Number of days: 1            Peripheral IV - Single Lumen 08/26/19 1119 22 G Left;Posterior Hand (Active)   Site Assessment Clean;Dry;Intact 8/27/2019  3:00 AM   Line Status Infusing 8/27/2019  3:00 AM   Dressing Status Clean;Dry;Intact 8/27/2019  3:00 AM   Dressing Intervention Dressing reinforced 8/26/2019  3:00 PM   Dressing Change Due 08/30/19 8/27/2019  3:00 AM   Site Change Due 08/30/19 8/26/2019  7:00 PM   Reason Not Rotated Not due 8/27/2019  3:00 AM   Number of days: 0            Peripheral IV - Single Lumen 08/26/19 1119 20 G Anterior;Right Forearm (Active)   Site Assessment Clean;Dry;Intact 8/27/2019  3:00 AM   Line Status Infusing 8/27/2019  3:00 AM   Dressing Status Clean;Dry;Intact 8/27/2019  3:00 AM   Dressing Intervention Dressing reinforced 8/26/2019  3:00 PM   Dressing Change Due 08/30/19 8/27/2019  3:00 AM   Site Change Due 08/30/19 8/26/2019  7:00 PM  "  Reason Not Rotated Not due 8/27/2019  3:00 AM   Number of days: 0            Arterial Line 08/26/19 1700 Right Femoral (Active)   Site Assessment Clean;Dry;Intact 8/27/2019  3:00 AM   Line Status Pulsatile blood flow 8/27/2019  3:00 AM   Art Line Waveform Appropriate 8/27/2019  3:00 AM   Arterial Line Interventions Zeroed and calibrated;Leveled;Connections checked and tightened;Flushed per protocol;Line pulled back 8/27/2019  3:00 AM   Color/Movement/Sensation Capillary refill less than 3 sec 8/27/2019  3:00 AM   Dressing Type Transparent 8/27/2019  3:00 AM   Dressing Status Clean;Dry;Intact 8/27/2019  3:00 AM   Dressing Change Due 08/30/19 8/27/2019  3:00 AM   Number of days: 0            NG/OG Tube 08/26/19 1140 orogastric 18 Fr. Center mouth (Active)   Placement Check placement verified by x-ray 8/27/2019  3:00 AM   Tolerance signs/symptoms of discomfort 8/27/2019  3:00 AM   Securement secured to commercial device 8/27/2019  3:00 AM   Clamp Status/Tolerance unclamped 8/27/2019  3:00 AM   Suction Setting/Drainage Method suction at;low;moderate 8/27/2019  3:00 AM   Insertion Site Appearance no redness, warmth, tenderness, skin breakdown, drainage 8/27/2019  3:00 AM   Drainage Green;Brown 8/27/2019  3:00 AM   Intake (mL) 60 mL 8/27/2019  9:00 AM   Number of days: 0            Urethral Catheter 08/26/19 1230 (Active)   Site Assessment Clean;Intact 8/27/2019  3:00 AM   Collection Container Urimeter 8/27/2019  3:00 AM   Securement Method secured to top of thigh w/ adhesive device 8/27/2019  3:00 AM   Catheter Care Performed no 8/27/2019  3:00 AM   Reason for Continuing Urinary Catheterization Critically ill in ICU requiring intensive monitoring 8/27/2019  3:00 AM   CAUTI Prevention Bundle StatLock in place w 1" slack;Intact seal between catheter & drainage tubing;Drainage bag off the floor;No dependent loops or kinks;Green sheeting clip in use;Drainage bag not overfilled (<2/3 full);Drainage bag below bladder " 8/26/2019 11:00 PM   Output (mL) 10 mL 8/27/2019  9:00 AM   Number of days: 0       Significant Labs:  CBC:  Recent Labs   Lab 08/29/19  0300   WBC 5.71   RBC 2.45*   HGB 8.0*   HCT 25.7*   *   *   MCH 32.7*   MCHC 31.1*     BMP:  Recent Labs   Lab 08/29/19  0300      K 4.4      CO2 11*   BUN 19   CREATININE 1.2   CALCIUM 8.3*      Tacrolimus Levels:  Recent Labs   Lab 08/29/19  0300   TACROLIMUS 5.3     Microbiology:  Microbiology Results (last 7 days)     Procedure Component Value Units Date/Time    Blood culture #1 **CANNOT BE ORDERED STAT** [[id number]]  (Abnormal) Collected:  08/26/19 0848    Order Status:  Completed Specimen:  Blood from Line, Port A Cath Updated:  08/29/19 1111     Blood Culture, Routine Gram stain lisa bottle: Gram positive cocci in clusters resembling Staph       Results called to and read back by:Monet Martinez RN 08/27/2019  06:04      Gram stain aer bottle: Gram positive cocci in clusters resembling Staph       08/28/2019  10:29      METHICILLIN RESISTANT STAPHYLOCOCCUS AUREUS  ID consult required at [facility].  For susceptibility see order #[id number]      Blood culture #2 **CANNOT BE ORDERED STAT** [[id number]]  (Abnormal)  (Susceptibility) Collected:  08/26/19 0859    Order Status:  Completed Specimen:  Blood from Wrist, Right Updated:  08/29/19 1110     Blood Culture, Routine Gram stain aer bottle: Gram positive cocci in clusters resembling Staph       Results called to and read back by:Monet Martinez RN 08/27/2019 03:23      Gram stain lisa bottle: Gram positive cocci in clusters resembling Staph       08/27/2019  11:38      METHICILLIN RESISTANT STAPHYLOCOCCUS AUREUS  ID consult required at [facility].      Blood culture [[id number]] Collected:  08/28/19 1250    Order Status:  Completed Specimen:  Blood from Peripheral, Hand, Right Updated:  08/28/19 2115     Blood Culture,  Routine No Growth to date    Blood culture [[id number]] Collected:  08/28/19 1020    Order Status:  Completed Specimen:  Blood from Peripheral, Wrist, Right Updated:  08/28/19 1715     Blood Culture, Routine No Growth to date    Culture, Respiratory [[id number]]  (Abnormal)  (Susceptibility) Collected:  08/26/19 1441    Order Status:  Completed Specimen:  Respiratory from Bronchial Wash Updated:  08/28/19 1213     Respiratory Culture METHICILLIN RESISTANT STAPHYLOCOCCUS AUREUS  Many       Gram Stain (Respiratory) <10 epithelial cells per low power field.     Gram Stain (Respiratory) Few WBC's     Gram Stain (Respiratory) Many Gram positive cocci    Narrative:       Bronchial Wash    Fungus culture [[id number]]  (Abnormal) Collected:  08/26/19 1441    Order Status:  Completed Specimen:  Respiratory from Bronchial Wash Updated:  08/28/19 1101     Fungus (Mycology) Culture YEAST   Many  Identification pending      Narrative:       Bronchial Wash    AFB Culture & Smear [[id number]] Collected:  08/26/19 1441    Order Status:  Completed Specimen:  Respiratory from Bronchial Wash Updated:  08/27/19 2127     AFB Culture & Smear Culture in progress     AFB CULTURE STAIN No acid fast bacilli seen.    Narrative:       Bronchial Wash    Respiratory Infection Panel, Nasopharyngeal [[id number]] Collected:  08/26/19 1003    Order Status:  Completed Specimen:  Nasopharyngeal Swab Updated:  08/26/19 1344     Respiratory Infection Panel Source NP Swab     Adenovirus Not Detected     Coronavirus 229E Not Detected     Coronavirus HKU1 Not Detected     Coronavirus NL63 Not Detected     Coronavirus OC43 Not Detected     Human Metapneumovirus Not Detected     Human Rhinovirus/Enterovirus Not Detected     Influenza A (subtypes H1, H1-2009,H3) Not Detected     Influenza B Not Detected     Parainfluenza Virus 1 Not Detected     Parainfluenza Virus 2 Not Detected     Parainfluenza Virus 3 Not Detected     Parainfluenza Virus 4 Not Detected      Respiratory Syncytial Virus Not Detected     Bordetella Parapertussis (ZB6551) Not Detected     Bordetella pertussis (ptxP) Not Detected     Chlamydia pneumoniae Not Detected     Mycoplasma pneumoniae Not Detected    Narrative:       For all other respiratory sources order [id number] Respiratory  Viral Panel by PCR (RVPCR)    Culture, Respiratory  - Cystic Fibrosis [[id number]]     Order Status:  Canceled Specimen:  Respiratory           I have reviewed all pertinent labs within the past 24 hours.    Diagnostic Results:  Chest X-Ray: I personally reviewed the films and findings are:, bilateral opacities with improved aeration as compared to previous CXR. Right pleural effusion.     No Further        Assessment/Plan:     * Septic shock  Multifactorial given staph bacteremia and PNA. History of recurrent  pseudomonal infections and MRSA. Continue Vancomycin for MRSA from BAL and blood cultures. Maintain MAPs >65. Will follow up on cultures and adjust therapy as needed. Will likely discontinue Zerbaxa once cultures finalized.     S/P lung transplant  Underwent bilateral re-transplant for CARLOS EDUARDO on 6/18/19. Continue immunosuppression and prophylaxis.     Immunosuppression  Continue prednisone. Will hold tacrolimus in setting of LISBETH. Daily tacrolimus levels.     Prophylactic antibiotic  Continue valcyte, dapsone, and voriconazole.     Chronic pain with opiate use  Will hold chronic pain meds while intubated and sedated.     CF related Pancreatic insufficiency  Will start Viokace once started on tube feeds.     LISBETH (acute kidney injury)  Started on CRRT for metabolic acidosis and oliguric LISBETH. Will continue to hold nephrotoxic meds. Nephrology following appreciate recs.     Acute respiratory failure  Continue ventilation at current settings. Will follow up on BAL from 8/26 bronchoscopy. Daily ABG and CXR. Not appropriate for extubation today.     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    Bacteremia  Blood cultures from 8/26 with MRSA. Right chest port removed. Repeat blood cultures NGTD. ID following, appreciate recs. Continue vancomycin.         Preventive Measures: Nutrition: Goal: tube feeds, Stress Ulcer: continue prophyllaxis, DVT: continue prophyllaxis, Head of Bet: elevated, Reposition: per unit routine    Counseling/Consultation:I discussed the case with Dr. Alvarez.    Shama Canales PA-C  Lung Transplant  [facility]

## 2019-08-29 NOTE — PROGRESS NOTES
MD notified of temp of 93 degrees and all interventions taken to increase temperature, including: warming blankets, warming packs, warmed IV fluids.  No new orders at this time.  Will continue to monitor.

## 2019-08-29 NOTE — ASSESSMENT & PLAN NOTE
Contributing Nutrition Diagnosis  Malnutrition in the context of Chronic Illness/Injury    Related to (etiology):  CF s/p BLTx 6/2018 and redo BLTx 6/2019    Signs and Symptoms (as evidenced by):  Energy Intake: <50% of estimated energy requirement for 2 days PTA per , likely inadequate before that  Body Fat Depletion: mild depletion of orbitals and thoracic and lumbar region   Muscle Mass Depletion: mild depletion of temples, clavicle region, interosseous muscle and lower extremities   Weight Loss: 15.3% x 3 months (some likely fluid related)   Fluid Accumulation: mild    Interventions/Recommendations (treatment strategy):  Collaboration of nutrition care with other providers    Nutrition Diagnosis Status:  New

## 2019-08-29 NOTE — PROGRESS NOTES
CRRT machine changed. Restarted with SCUF treatment. Fluid warmer connected to pre-filter saline. Alarms set and all lines secured. Daily checks done.

## 2019-08-30 PROBLEM — Z78.9 ON ENTERAL NUTRITION: Status: RESOLVED | Noted: 2019-01-01 | Resolved: 2019-01-01

## 2019-08-30 NOTE — ASSESSMENT & PLAN NOTE
Ms. 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 Her baseline SCr in the past year has been around ~ 1.0. LISBETH likely secondary to ATN caused by hypotension/sepsis in the setting of severe metabolic acidosis.    Recommendations:    - Will continue SLED for metabolic clearance and volume management, target -300 ml/hr as tolerated.   - Primary team has plans to extubate this AM; will increase bicarb to help patient be slightly on the alkalotic side rather than acidotic which will increase respiratory rate  - Strict I/O and chart  - Avoid nephrotoxic medications, NSAIDs, IV contrast, etc.  - Medication doses adjusted to GFR  - Maintain MAP > 65  - Will follow closely

## 2019-08-30 NOTE — SUBJECTIVE & OBJECTIVE
Interval History: Patient evaluated bedside, stable vital signs, still remains intubated, currently on SLED.  Night uneventful.  More alert this AM.    Review of patient's allergies indicates:   Allergen Reactions    Albuterol Palpitations    Colistin Anaphylaxis    Vancomycin analogues      Infusion reaction that does not resolve with slowing  Pt. States she can tolerate it when given with 50 mg Benadryl and ran over 3 hours    Neupogen [filgrastim] Other (See Comments)     Ostealgia after five daily doses of 300 mcg.      Bactrim [sulfamethoxazole-trimethoprim] Hives    Ceftazidime Hives     Pt stated can tolerate cefapine not ceftazidime    Ceftazidime     Dronabinol Other (See Comments)     Mental changes/hallucinations    Haldol [haloperidol lactate] Other (See Comments)     Seizure like activity    Nsaids (non-steroidal anti-inflammatory drug)      Cannot have due to lung transplant    Adhesive Rash     Cloth tape- please use tegaderm or paper tape    Aztreonam Rash    Ciprofloxacin Nausea And Vomiting     Projectile N/V, per patient.  Unwilling to retry therapy.     Current Facility-Administered Medications   Medication Frequency    chlorhexidine 0.12 % solution 15 mL BID    dexmedetomidine (PRECEDEX) 400mcg/100mL 0.9% NaCL infusion Continuous    dextrose 10% (D10W) Bolus PRN    dextrose 10% (D10W) Bolus PRN    diphenhydrAMINE 12.5 mg/5 mL elixir 25 mg Daily PRN    glucagon (human recombinant) injection 1 mg PRN    glucose chewable tablet 16 g PRN    glucose chewable tablet 24 g PRN    heparin (porcine) injection 5,000 Units Q12H    insulin aspart U-100 pen 0-4 Units PRN    insulin regular (Humulin R) 100 Units in sodium chloride 0.9% 100 mL infusion Continuous    levalbuterol nebulizer solution 1.25 mg Q8H    lipase-protease-amylase 10,440-39,150- 39,150 unit Tab 1 tablet Q3H    LORazepam tablet 1 mg Q8H PRN    magnesium sulfate 2g in water 50mL IVPB (premix) PRN    metoprolol  injection 10 mg Q5 Min PRN    micafungin 100 mg in sodium chloride 0.9 % 100 mL IVPB (ready to mix system) Q24H    norepinephrine 4 mg in dextrose 5% 250 mL infusion (premix) (titrating) Continuous    ondansetron injection 8 mg Q6H PRN    oxyCODONE immediate release tablet Tab 10 mg Q8H PRN    predniSONE tablet 15 mg Daily    promethazine (PHENERGAN) 12.5 mg in dextrose 5 % 50 mL IVPB Q6H PRN    QUEtiapine tablet 25 mg Daily    QUEtiapine tablet 50 mg QHS    sodium chloride 0.9% bolus 1,000 mL Once    sodium phosphate 20.01 mmol in dextrose 5 % 250 mL IVPB PRN    sodium phosphate 30 mmol in dextrose 5 % 250 mL IVPB PRN    sodium phosphate 39.99 mmol in dextrose 5 % 250 mL IVPB PRN    topiramate tablet 25 mg BID    vancomycin 1.25 g in dextrose 5% 250 mL IVPB (ready to mix) Once    vasopressin (PITRESSIN) 0.2 Units/mL in dextrose 5 % 100 mL infusion Continuous    voriconazole tablet 200 mg BID       Objective:     Vital Signs (Most Recent):  Temp: 97.8 °F (36.6 °C) (08/30/19 1100)  Pulse: (!) 119 (08/30/19 1410)  Resp: (!) 36 (08/30/19 0812)  BP: 127/69 (08/30/19 0902)  SpO2: 96 % (08/30/19 1410)  O2 Device (Oxygen Therapy): High Flow nasal Cannula (08/30/19 1400) Vital Signs (24h Range):  Temp:  [97.8 °F (36.6 °C)-99.5 °F (37.5 °C)] 97.8 °F (36.6 °C)  Pulse:  [103-154] 119  Resp:  [24-36] 36  SpO2:  [84 %-100 %] 96 %  BP: ()/(52-69) 127/69  Arterial Line BP: ()/(38-86) 112/61     Weight: 50.2 kg (110 lb 10.7 oz) (08/30/19 0500)  Body mass index is 21.61 kg/m².  Body surface area is 1.46 meters squared.    I/O last 3 completed shifts:  In: 9982.8 [I.V.:9452.8; NG/GT:530]  Out: 00869 [Drains:735; Other:56629]    Physical Exam   Constitutional: She appears well-developed. She is sedated and intubated.   on mechanical ventilation   HENT:   Head: Normocephalic and atraumatic.   Eyes: Right eye exhibits no discharge. Left eye exhibits no discharge. No scleral icterus.   Neck: Neck supple.    Cardiovascular: Normal rate and regular rhythm.   Pulmonary/Chest: She is intubated. No respiratory distress. She has no wheezes. She has rales.   Transmitted ventilator sounds   Abdominal: Soft. Bowel sounds are normal.   Musculoskeletal: She exhibits edema. She exhibits no tenderness or deformity.   Neurological: She is alert.   Skin: Skin is warm. There is pallor.   Nursing note and vitals reviewed.      Significant Labs:  CBC:   Recent Labs   Lab 08/30/19  0301   WBC 12.71*   RBC 2.73*   HGB 8.3*   HCT 27.2*      *   MCH 30.4   MCHC 30.5*     CMP:   Recent Labs   Lab 08/30/19  0301 08/30/19  1359   * 115*   CALCIUM 8.6* 8.3*   ALBUMIN 2.7*  2.7* 2.5*   PROT 8.3  --     138   K 4.3 4.1   CO2 21* 22*    102   BUN 3* 5*   CREATININE 0.5 0.6   ALKPHOS 195*  --    ALT 10  --    AST 17  --    BILITOT 0.4  --      All labs within the past 24 hours have been reviewed.     Significant Imaging:  CXR personally reviewed.

## 2019-08-30 NOTE — MEDICAL/APP STUDENT
[facility]  Nephrology  Consult Note    Patient Name: Juanita Ibarra  MRN: [id number]  Admission Date: 8/26/2019  Hospital Length of Stay: 4 days  Attending Provider: Jake Alvarez MD   Primary Care Physician: Primary Doctor No  Principal Problem:Septic shock    Consults  Subjective:     HPI:  A 28 y/o female s/p bilateral re-transplant (6/18/19) secondary to CF, who presented to the ED in acute respiratory distress. Patient's family member reports that she had and episode of fever 2 days ago accompanied by productive cough requiring increasing at home oxygen to ~5L. Also, caregiver reports poor appetite and PO intake over the last week. Upon arrival to ED, oxygen saturations were in mid 70s while on NRB and patient was placed on BiPAP which was transitioned to 25L comfort flow at 100% FiO2. On arrival to ED and worsening clinical status patient was transferred to the ICU, intubated, and started on Zerbaxa/Vancomycin.  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    Cystic fibrosis of the lung     Ear infection     Hypertension     Migraine headache     MRSA (methicillin resistant Staphylococcus aureus) carrier     Osteopenia     Other specified disease of pancreas     Pain disorder     Seizures 2013    Sinusitis, chronic     Vocal cord paralysis 06/2014    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       Past Surgical History:   Procedure Laterality Date    ABDOMINAL SURGERY      peg tube     [id number] N/A 5/19/2015    [facility] OR    BIOPSY-BRONCHUS N/A 12/20/2016    [facility] 2ND FLR    Bronchoscopy N/A 7/5/2019    Performed  by Francisca Morin DO at [facility] OR 2ND FLR    BRONCHOSCOPY Bilateral 12/11/2018    Performed by Francisca Morin DO at [facility] OR 2ND FLR    BRONCHOSCOPY N/A 10/1/2018    Performed by Jake Alvarez MD at [facility] OR 2ND FLR    BRONCHOSCOPY Bilateral 12/20/2016    Performed by Jake Alvarez MD at [facility] OR 2ND FLR    BRONCHOSCOPY - flexible bronchoscopy with probable tissue biopsy CPT 59520 N/A 7/21/2015    Performed by Jake Alvarez MD at [facility] OR 2ND FLR    BRONCHOSCOPY - flexible bronchoscopy with probable tissue biospy CPT 17402 N/A 10/20/2015    Performed by Jake Alvarez MD at [facility] OR 2ND FLR    BRONCHOSCOPY - flexible bronchoscopy with tissue biopsy CPT 71721 N/A 9/29/2015    Performed by Jake Alvarez MD at [facility] OR 2ND FLR    BRONCHOSCOPY - flexible bronchoscopy with tissue biopsy CPT 82435 N/A 5/26/2015    Performed by Jaek Alvarez MD at [facility] OR 1ST FLR    BRONCHOSCOPY flexible bronchoscopy with tissue biopsy N/A 9/8/2014    Performed by Jake Alvarez MD at [facility] OR 2ND FLR    BRONCHOSCOPY flexible with possible tissue biopsy N/A 8/8/2014    Performed by Jake Alvarez MD at [facility] OR 2ND FLR    BRONCHOSCOPY, BAL, BIOPSIES N/A 3/20/2018    Performed by Jake Alvarez MD at [facility] OR 2ND FLR    BRONCHOSCOPY-FIBEROPTIC N/A 1/29/2016    Performed by Joann Cifuentes DO at [facility] OR 2ND FLR    BRONCHOSCOPY; Bronchoscopy with BAL and transbronchial biopsies under general anesthesia N/A 5/29/2018    Performed by Jake Alvarez MD at [facility] OR 2ND FLR    CHOLECYSTECTOMY  2016    CHOLECYSTECTOMY-LAPAROSCOPIC N/A 7/22/2016    Performed by Joshua Goldberg, MD at [facility] OR 2ND FLR    COLONOSCOPY N/A 5/3/2019    Performed by Juancho Rich MD at [facility] ENDO (2ND FLR)    ENDOMETRIAL ABLATION  2015    KJB    ETHMOIDECTOMY Bilateral 4/9/2019    Performed by Adin Burks III, MD at [facility] OR 2ND FLR    EXPLORATION, WOUND - right chest N/A 8/28/2019    [facility]    FESS      FESS Bilateral 10/21/2013    Performed by Jared Whatley MD at [facility] OR [address] [place] [address]    FESS, USING COMPUTER-ASSISTED NAVIGATION N/A 4/9/2019    Performed by Adin Burks III, MD at [facility] OR [address] [place] [address]    FINE NEEDLE ASPIRATION (FNA)  of a cervical lymphadenopathy  1/29/2016    [facility]    flex bronchoscopy with probable tissue biopsy N/A 7/31/2014    Performed by [facility] Diagnostic Provider at [facility] OR [address] [place] [address]    flexible bronchoscopy with tissue biopsy N/A 12/11/2014    [facility]    flexible bronchoscopy with tissue biopsy  CPT 44761 N/A 8/15/2019    [facility]    flexible bronchoscopy with tissue biopsy CPT 61492  N/A 7/26/2019    Performed by Jake Alvarez MD at [facility] OR [facility] FLR    HYSTERECTOMY  04/2017    [facility]    INJECTION-VOCAL CORD Left 6/24/2014    [facility]    [id number]-Z-CATH to right chest and removal of portacath to left chests wall. Bilateral 6/24/2014    Performed by Zhen Dorado MD at [facility] OR [address] [place] [address]    LARYNX SURGERY  2016    LUNG TRANSPLANT Bilateral 6/12/14    MAXILLARY ANTROSTOMY  4/9/2019    Performed by Adin Burks III, MD at [facility] OR [address] [place] [address]    MYRINGOTOMY W/ TUBES Right 04/2017    MYRINGOTOMY WITH INSERTION OF PE TUBES Right 4/15/2017    Performed by Gary Null MD at [facility] OR [facility] FLR    PLACEMENT-TUBE-PEG  5/15/2014    [facility]    PORTACATH PLACEMENT Right     rt sc    REDO BILATERAL LUNG TRANSPLANT; CLAMSHELL Bilateral 6/18/2019    [facility]    REMOVAL-TUBE-PEG  5/15/2014    [facility]    RHC for lung re-transplant N/A 1/22/2019    Performed by Laura Rachel MD at [facility] CATH LAB    ROBOTIC  ASSISTED LAPAROSCOPIC HYSTERECTOMY N/A 4/25/2017    Performed by [facility]y Dias Jr., MD at [facility] OR    SALPINGECTOMY Bilateral 2015    KJB    SALPINGECTOMY-LAPAROSCOPIC Bilateral 5/19/2015    Performed by Deny Dias Jr., MD at [facility] OR    SINUS SURGERY FUNCTIONAL ENDOSCOPIC WITH NAVIGATION Bilateral 4/3/2017    [facility]    SINUS SURGERY FUNCTIONAL ENDOSCOPIC WITH NAVIGATION, 18495, 43768, 99470, 35366 Bilateral 2/5/2015    [facility]    SPHENOIDECTOMY Bilateral 4/9/2019    Performed by Adin Burks III, MD at [facility] OR [address] [place] [address]    THYROPLASTY - Medialization laryngoplasty, cricothyroid subluxation, arytenoid repositioning Left 8/2/2016    [facility]    TRANSPLANT-LUNG Bilateral 6/11/2014    Performed by Adolfo Huston MD at [facility] OR [address] [place] [address]       Review of patient's allergies indicates:   Allergen Reactions    Albuterol Palpitations    Colistin Anaphylaxis    Vancomycin analogues      Infusion reaction that does not resolve with slowing  Pt. States she can tolerate it when given with 50 mg Benadryl and ran over 3 hours    Neupogen [filgrastim] Other (See Comments)     Ostealgia after five daily doses of 300 mcg.      Bactrim [sulfamethoxazole-trimethoprim] Hives    Ceftazidime Hives     Pt stated can tolerate cefapine not ceftazidime    Ceftazidime     Dronabinol Other (See Comments)     Mental changes/hallucinations    Haldol [haloperidol lactate] Other (See Comments)     Seizure like activity    Nsaids (non-steroidal anti-inflammatory drug)      Cannot have due to lung transplant    Adhesive Rash     Cloth tape- please use tegaderm or paper tape    Aztreonam Rash    Ciprofloxacin Nausea And Vomiting     Projectile N/V, per patient.  Unwilling to retry therapy.     Current Facility-Administered Medications   Medication Frequency    chlorhexidine 0.12 % solution  15 mL BID    dexmedetomidine (PRECEDEX) 400mcg/100mL 0.9% NaCL infusion Continuous    dextrose 10% (D10W) Bolus PRN    dextrose 10% (D10W) Bolus PRN    diphenhydrAMINE 12.5 mg/5 mL elixir 25 mg Daily PRN    fentaNYL 2500 mcg in 0.9% sodium chloride 250 mL infusion premix (titrating) Continuous    glucagon (human recombinant) injection 1 mg PRN    glucose chewable tablet 16 g PRN    glucose chewable tablet 24 g PRN    heparin (porcine) injection 5,000 Units Q12H    insulin aspart U-100 pen 0-4 Units PRN    insulin regular (Humulin R) 100 Units in sodium chloride 0.9% 100 mL infusion Continuous    levalbuterol nebulizer solution 1.25 mg Q8H    lipase-protease-amylase 10,440-39,150- 39,150 unit Tab 1 tablet Q3H    LORazepam tablet 1 mg Q8H PRN    magnesium sulfate 2g in water 50mL IVPB (premix) PRN    metoprolol injection 10 mg Q5 Min PRN    micafungin 100 mg in sodium chloride 0.9 % 100 mL IVPB (ready to mix system) Q24H    norepinephrine 4 mg in dextrose 5% 250 mL infusion (premix) (titrating) Continuous    ondansetron injection 8 mg Q6H PRN    predniSONE tablet 15 mg Daily    propofol (DIPRIVAN) 10 mg/mL infusion Continuous    QUEtiapine tablet 25 mg Daily    QUEtiapine tablet 50 mg QHS    sodium chloride 0.9% bolus 1,000 mL Once    sodium phosphate 20.01 mmol in dextrose 5 % 250 mL IVPB PRN    sodium phosphate 30 mmol in dextrose 5 % 250 mL IVPB PRN    sodium phosphate 39.99 mmol in dextrose 5 % 250 mL IVPB PRN    topiramate tablet 25 mg BID    vancomycin 1.25 g in dextrose 5% 250 mL IVPB (ready to mix) Once    vasopressin (PITRESSIN) 0.2 Units/mL in dextrose 5 % 100 mL infusion Continuous    voriconazole tablet 200 mg BID     Family History     Problem Relation (Age of Onset)    Cancer Maternal Grandmother    Diabetes Maternal Grandfather    Drug abuse Maternal Grandfather    Hypertension Maternal Grandmother    Thrombocytopenia Maternal Grandfather        Tobacco Use    Smoking  status: Never Smoker    Smokeless tobacco: Never Used   Substance and Sexual Activity    Alcohol use: No     Comment: Has not had an alcoholic drink in more than 2 months.    Drug use: No    Sexual activity: Yes     Partners: Male     Birth control/protection: See Surgical Hx     Comment: current partner since Oct 2016     Unable to conduct a ROS, as the patient is intubated.     Objective:     Vital Signs (Most Recent):  Temp: 97.8 °F (36.6 °C) (08/30/19 0700)  Pulse: (!) 154 (08/30/19 0902)  Resp: (!) 36 (08/30/19 0812)  BP: 127/69 (08/30/19 0902)  SpO2: (!) 88 % (08/30/19 0902)  O2 Device (Oxygen Therapy): ventilator (08/30/19 0902) Vital Signs (24h Range):  Temp:  [97.8 °F (36.6 °C)-99.5 °F (37.5 °C)] 97.8 °F (36.6 °C)  Pulse:  [103-154] 154  Resp:  [24-36] 36  SpO2:  [88 %-100 %] 88 %  BP: ()/(52-69) 127/69  Arterial Line BP: ()/(46-86) 159/64     Weight: 50.2 kg (110 lb 10.7 oz) (08/30/19 0500)  Body mass index is 21.61 kg/m².  Body surface area is 1.46 meters squared.    I/O last 3 completed shifts:  In: 9982.8 [I.V.:9452.8; NG/GT:530]  Out: 04004 [Drains:735; Other:44111]    Physical Exam   Constitutional: She appears well-developed and well-nourished.   HENT:   Head: Normocephalic and atraumatic.   Eyes: Pupils are equal, round, and reactive to light. Conjunctivae and EOM are normal.   Cardiovascular: Normal rate, regular rhythm, normal heart sounds and intact distal pulses.   No murmur heard.  Pulmonary/Chest: Effort normal and breath sounds normal. No respiratory distress. She has no wheezes. She has no rales.   Abdominal: Soft. Bowel sounds are normal. She exhibits no distension.   Musculoskeletal: She exhibits no edema.   Neurological: She is alert.   Skin: Skin is warm. Capillary refill takes less than 2 seconds.     Significant Labs:  Lab Results   Component Value Date    WBC 12.71 (H) 08/30/2019    HGB 8.3 (L) 08/30/2019    HCT 27.2 (L) 08/30/2019     (H) 08/30/2019      08/30/2019     BMP  Lab Results   Component Value Date     08/30/2019    K 4.3 08/30/2019     08/30/2019    CO2 21 (L) 08/30/2019    BUN 3 (L) 08/30/2019    CREATININE 0.5 08/30/2019    CALCIUM 8.6 (L) 08/30/2019    ANIONGAP 14 08/30/2019    ESTGFRAFRICA >60.0 08/30/2019    EGFRNONAA >60.0 08/30/2019     Significant Imaging:  X-Ray Chest AP Portable  Narrative: EXAMINATION:  XR CHEST AP PORTABLE    CLINICAL HISTORY:  respiratory failure, mechanical ventilation s/p lung transplant;    TECHNIQUE:  Single frontal view of the chest was performed.    COMPARISON:  08/29/2019, 05:03 hours.    FINDINGS:  Sternal wires reflect prior midline and transverse sternotomies.  They appear intact and remain in good location.    Endotracheal tube and other life support devices remain in good location.    There is increased attenuation at the lateral aspect of the right mid and lower lung zones.  I suspect this represents a small amount of right pleural fluid inserting into 1 of the right pleural fissures.  This is consistent with redistribution of right pleural fluid and does not necessarily indicate increasing quantity of right pleural fluid.    No other significant change compared to yesterday's study.  Mediastinal structures remain midline.  Impression: Please see above.    Electronically signed by: Adela Davalos MD  Date:    08/30/2019  Time:    08:27      Assessment/Plan:     Active Diagnoses:    Diagnosis Date Noted POA    PRINCIPAL PROBLEM:  Septic shock [A41.9, R65.21] 08/26/2019 Yes    Bacteremia [R78.81] 08/28/2019 No    Alteration in skin integrity related to surgical incision [R23.9] 08/27/2019 Yes    Preventive measure [Z29.9] 08/27/2019 Not Applicable    Acute respiratory failure [J96.00] 08/26/2019 Yes    Alteration in skin integrity [R23.9] 08/26/2019 Yes    On enteral nutrition [Z78.9] 06/25/2019 Unknown    S/P lung transplant [Z94.2] 10/24/2015 Not Applicable    LISBETH (acute kidney injury)  [N17.9] 08/14/2015 Yes    Prophylactic antibiotic [Z79.2] 06/30/2014 Not Applicable    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 The patient was found to be febrile (101 F), tachycardic, and hypotensive suggestive of end organ damage to the kidneys in the context of septic shock. Subsequent blood cultures were positive for MRSA, respiratory cultures were positive for candida albicans, candida glabrata, and pseudomonas aeruginosa confirming an infectious component for this presentation. The patient is receiving SLED and is tolerating well, however, she remains anuric with a metabolic acidosis. For these reasons, we are continuing SLED for metabolic clearance and volume management.    Plan:    Acute Kidney Injury  -Continue SLED for metabolic clearance and volume management with a target UF of 300-350 mL per hour as tolerated  -Patient remains anuric- continue to monitor  -CVP=22; patient is euvolemic on exam  -Strict Is/Os  -Avoid nephrotoxins- no contrast, NSAIDs, ACEi/ARB  -Maintain MAP >65  -Renally dose meds    Metabolic Acidosis  -ABG suggestive of metabolic acidosis (pH=7.305, CO2=37.4, O2=67, HCO3=18.6)  -Serum bicarb=21  -Bicarb content in dialysate increased as per primary team's request with intent to extubate today      Thank you for your consult. I will follow-up with the patient directly. Contact if any questions or concerns.     Caitie Chapin  Nephrology  [facility]

## 2019-08-30 NOTE — PROGRESS NOTES
[facility]  Infectious Disease  Progress Note    Patient Name: Juanita Ibarra  MRN: [id number]  Admission Date: 8/26/2019  Length of Stay: 4 days  Attending Physician: Jake Alvarez MD  Primary Care Provider: Primary Doctor No    Isolation Status: Contact and Neutropenic  Assessment/Plan:      * Septic shock  30yo woman w/a history of CF (c/b pancreatic insufficiency, sinus disease, MRSA/Pseudomonas colonization c/b numerous antibiotics allergies, and subsequent COPD/ESLD; s/p BOLT 6/12/2014, CMV D+/R-, simulect induction, on maintenance tacro/MMF/pred; c/b multiple episodes of MRSA/Pseudomonas pneumonia/sinusitis, C.glabrata early post-operative colonization 7/2014, A1 rejection 8/2014 s/p pulse SM, prior CMV reactivations, and subsequent grade 3 CARLOS EDUARDO; s/p redo BOLT 6/18/2019, CMV D-/R+, basiliximab induction, on maintenance tacro/MMF/pred; c/b  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 She is weaning from pressors on vancomycin and ongoing voriconazole/micafungin after port was removed. Notably U/S shows R jugular DVT adjacent to R SC port.    - would continue vancomycin (goal trough 15-20) for MRSA septicemia and pneumonia  - would continue voriconazole for prior invasive aspergillosis; await BAL antigen from recent bronch; repeat level  - may continue micafungin while awaiting yeast ID but likely commensal Candida that doesn't require additional coverage  - remainder of prophylaxis per protocol  - will require SUZY given persistent septicemia  - repeat cultures ordered  - contact precautions for MRSA          Anticipated Disposition: pending  improvement    Thank you for your consult. I will follow-up with patient. Please contact us if you have any additional questions.     Juanita Francis MD  Transplant ID Attending  [id number]    Juanita Francis MD  Infectious Disease  [facility]    Subjective:     Principal Problem:Septic shock    HPI: No notes on file  Interval History: Extubated. Afebrile. Cultures still positive.    Review of Systems   Unable to perform ROS: Acuity of condition     Objective:     Vital Signs (Most Recent):  Temp: 97.8 °F (36.6 °C) (08/30/19 1100)  Pulse: (!) 133 (08/30/19 1540)  Resp: (!) 24 (08/30/19 1531)  BP: 127/69 (08/30/19 0902)  SpO2: (!) 91 % (08/30/19 1540) Vital Signs (24h Range):  Temp:  [97.8 °F (36.6 °C)-99.5 °F (37.5 °C)] 97.8 °F (36.6 °C)  Pulse:  [103-154] 133  Resp:  [24-36] 24  SpO2:  [84 %-100 %] 91 %  BP: ()/(52-69) 127/69  Arterial Line BP: ()/(38-86) 98/47     Weight: 50.2 kg (110 lb 10.7 oz)  Body mass index is 21.61 kg/m².    Estimated Creatinine Clearance: 98.5 mL/min (based on SCr of 0.6 mg/dL).    Physical Exam   Constitutional: She appears well-developed and well-nourished. She has a sickly appearance. No distress.   HENT:   Head: Normocephalic.   Nose: Nose normal.   Eyes: Conjunctivae are normal. No scleral icterus.   Neck: Normal range of motion. Neck supple. No JVD present. No tracheal deviation present.   Cardiovascular: Regular rhythm and normal heart sounds. Tachycardia present. Exam reveals no gallop and no friction rub.   No murmur heard.  Pulmonary/Chest: No respiratory distress. She has decreased breath sounds in the right lower field. She has no wheezes. She has rhonchi in the right upper field, the right middle field, the left upper field and the left middle field. She has no rales.   Abdominal: Soft. Bowel sounds are normal. She exhibits no distension. There is no tenderness.   Genitourinary:   Genitourinary Comments: saleh   Musculoskeletal: Normal range of  motion. She exhibits no edema, tenderness or deformity.   Neurological:   Follows commands and moves all 4 extremities   Skin: Skin is warm and dry. No erythema. There is pallor.   Nursing note and vitals reviewed.      Significant Labs:   CBC:   Recent Labs   Lab 08/29/19 0300 08/30/19  0301   WBC 5.71 12.71*   HGB 8.0* 8.3*   HCT 25.7* 27.2*   * 153     CMP:   Recent Labs   Lab 08/29/19 0300 08/29/19  2206 08/30/19 0301 08/30/19  1359      < > 138 138 138   K 4.4   < > 4.3 4.3 4.1      < > 102 103 102   CO2 11*   < > 18* 21* 22*   *   < > 123* 119* 115*   BUN 19   < > 2* 3* 5*   CREATININE 1.2   < > 0.5 0.5 0.6   CALCIUM 8.3*   < > 8.5* 8.6* 8.3*   PROT 7.7  --   --  8.3  --    ALBUMIN 2.5*  2.5*   < > 2.6* 2.7*  2.7* 2.5*   BILITOT 0.3  --   --  0.4  --    ALKPHOS 174*  --   --  195*  --    AST 16  --   --  17  --    ALT 11  --   --  10  --    ANIONGAP 17*   < > 18* 14 14   EGFRNONAA >60.0   < > >60.0 >60.0 >60.0    < > = values in this interval not displayed.       Significant Imaging: I have reviewed all pertinent imaging results/findings within the past 24 hours.

## 2019-08-30 NOTE — PHYSICIAN QUERY
PT Name: Juanita Ibarra  MR #: [id number]    Physician Query Form - Transplant Condition Clarification        CDS/: Alicia Ornelas RN             Contact information:[phone]  This form is a permanent document in the medical record.     Query Date: August 30, 2019    By submitting this query, we are merely seeking further clarification of documentation to reflect the severity of illness of your patient. Please utilize your independent clinical judgment when addressing the question(s) below.    The Medical record reflects the following:     Indicators   Supporting Clinical Findings Location in Medical Record   x History of organ transplant Ms. Ibarra is a 28 yo female s/p bilateral re-transplant (6/18/19) who presented to the ED in acute respiratory distress   H&P 8/26      Transplant Lung   x Acute/chronic condition(s) * Septic shock   Multifactorial given staph bacteremia and PNA. History of recurrent  pseudomonal infections and MRSA. Continue empiric Zerbaxa and Vancomycin. Maintain MAPs >65. Will follow up on cultures  and adjust therapy as needed   Progress Note 8/28       Transplant Lung    x Lab Value(s) Methicillin Resistant Staphylococcus aureus   Many    Culture Respiratory 8/26    Radiology/ US Findings     x Treatment/Medication - would continue vancomycin (goal trough 15-20) for MRSA septicemia and pneumonia    Progress Note 8/30      Infectious disease      Other       Provider, please specify if the ______ PNA_________________________  :    [ x  ] Affected the function of the transplanted organ, and is a complication     [   ] Did not affect the function of the transplanted organ, and is not a complication     [   ] Other explanation (please specify): _____________     [   ]  Clinically Undetermined       Please document in your progress notes daily for the duration of treatment until resolved, and include in your discharge summary.

## 2019-08-30 NOTE — PHYSICIAN QUERY
PT Name: Juanita Ibarra  MR #: [id number]    Physician Query Form - Consultant Diagnosis Clarification     CDS/: Alicia Ornelas RN               Contact information:[phone]  This form is a permanent document in the medical record.     Query Date: August 30, 2019      By submitting this query, we are merely seeking further clarification of documentation.  Please utilize your independent clinical judgment when addressing the question(s) below.      The Medical record contains the following:   Diagnosis Supporting Clinical Information Location in Medical Record     moderate malnutrition Per chart review, patient with ~20lb weight loss since transplant in June. NFPE completed, patient  with both fat and muscle wasting. Patient with moderate malnutrition.     Height: 5' (152.4 cm)   Weight Method: Bed Scale   Weight (lb): 110.23 lb   BMI (Calculated): 22.5       Assessment and Plan   Moderate malnutrition   Contributing Nutrition Diagnosis   Malnutrition in the context of Chronic Illness/Injury       Related to (etiology):   CF s/p BLTx 6/2018 and redo BLTx 6/2019       Signs and Symptoms (as evidenced by):   Energy Intake: <50% of estimated energy requirement for 2 days PTA per , likely inadequate before that   Body Fat Depletion: mild depletion of orbitals and thoracic and lumbar region   Muscle Mass Depletion: mild depletion of temples, clavicle region, interosseous muscle and lower extremities   Weight Loss: 15.3% x 3 months (some likely fluid related)   Fluid Accumulation: mild     Malnutrition Assessment   Malnutrition Type: chronic illness   Orbital Region (Subcutaneous Fat Loss): mild depletion   Upper Arm Region (Subcutaneous Fat Loss): (DAVE, restrained)   Thoracic and Lumbar Region: mild depletion   Oriental orthodox Region (Muscle Loss): mild depletion   Clavicle Bone Region (Muscle Loss): mild depletion   Clavicle and Acromion Bone Region (Muscle Loss): mild depletion   Scapular Bone Region  (Muscle Loss): (DAVE)   Dorsal Hand (Muscle Loss): mild depletion   Patellar Region (Muscle Loss): mild depletion   Anterior Thigh Region (Muscle Loss): mild depletion   Posterior Calf Region (Muscle Loss): mild depletion   Edema (Fluid Accumulation): 1-->trace              Consult 8/29       Nutrition         Do you agree with the Consultants diagnosis of ________moderate malnutrition_______________________?    [  ] Yes     [ x] No     [  ] Other/Clarification of findings:     [  ] Clinically undetermined

## 2019-08-30 NOTE — NURSING
Patient's heart rate 130s-160s. MAP stable > 65. Shama Canales PA-C at bedside. Orders placed for STAT 12 lead EKG and 5mg Lopressor IV to control HR. Will carry out orders.

## 2019-08-30 NOTE — ASSESSMENT & PLAN NOTE
30yo woman w/a history of CF (c/b pancreatic insufficiency, sinus disease, MRSA/Pseudomonas colonization c/b numerous antibiotics allergies, and subsequent COPD/ESLD; s/p BOLT 6/12/2014, CMV D+/R-, simulect induction, on maintenance tacro/MMF/pred; c/b multiple episodes of MRSA/Pseudomonas pneumonia/sinusitis, C.glabrata early post-operative colonization 7/2014, A1 rejection 8/2014 s/p pulse SM, prior CMV reactivations, and subsequent grade 3 CARLOS EDUARDO; s/p redo BOLT 6/18/2019, CMV D-/R+, basiliximab induction, on maintenance tacro/MMF/pred; c/b  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 She is weaning from pressors on vancomycin and ongoing voriconazole/micafungin after port was removed. Notably U/S shows R jugular DVT adjacent to R SC port.    - would continue vancomycin (goal trough 15-20) for MRSA septicemia and pneumonia  - would continue voriconazole for prior invasive aspergillosis; await BAL antigen from recent bronch; repeat level  - may continue micafungin while awaiting yeast ID but likely commensal Candida that doesn't require additional coverage  - remainder of prophylaxis per protocol  - will require SUZY given persistent septicemia  - repeat cultures ordered  - contact precautions for MRSA

## 2019-08-30 NOTE — ASSESSMENT & PLAN NOTE
Multifactorial given staph bacteremia and PNA. History of recurrent  pseudomonal infections and MRSA. Continue Vancomycin for MRSA from BAL and blood cultures. Maintain MAPs >65. Will follow up on cultures and adjust therapy as needed.

## 2019-08-30 NOTE — PROGRESS NOTES
Reyes Will NP notified of increase in pressor requirements over night. Currently at 0.12mcg/kg/min Levophed and 0.04units/min Vaso. Tube feeds going at 30ml/hr. NP and MDs to come to bedside shortly.

## 2019-08-30 NOTE — PROGRESS NOTES
[facility]  Lung Transplant  Progress Note - Critical Care    Patient Name: Juanita Ibarra  MRN: [id number]  Admission Date: 8/26/2019  Hospital Length of Stay: 4 days  Post-Operative Day: 1905 (Lung), 73 (Lung)  Attending Physician: Jake Alvarez MD  Primary Care Provider: Primary Doctor No     Subjective:     Interval History: No acute events overnight.  Successfully extubated today and tolerating HFNC.      Continuous Infusions:   dexmedetomidine (PRECEDEX) infusion Stopped (08/30/19 1300)    insulin (HUMAN R) infusion (adults) 0.3 Units/hr (08/30/19 1224)    norepinephrine bitartrate-D5W Stopped (08/30/19 0919)    vasopressin (PITRESSIN) infusion 0.04 Units/min (08/30/19 1400)     Scheduled Meds:   chlorhexidine  15 mL Mouth/Throat BID    heparin (porcine)  5,000 Units Subcutaneous Q12H    levalbuterol  1.25 mg Nebulization Q8H    lipase-protease-amylase  1 tablet Per OG tube Q3H    micafungin (MYCAMINE) IVPB  100 mg Intravenous Q24H    predniSONE  15 mg Per OG tube Daily    QUEtiapine  25 mg Per OG tube Daily    QUEtiapine  50 mg Per OG tube QHS    sodium chloride 0.9%  1,000 mL Intravenous Once    topiramate  25 mg Per OG tube BID    vancomycin (VANCOCIN) IVPB  1,250 mg Intravenous Once    voriconazole  200 mg Per OG tube BID     PRN Meds:Dextrose 10% Bolus, Dextrose 10% Bolus, diphenhydrAMINE, glucagon (human recombinant), glucose, glucose, insulin aspart U-100, LORazepam, magnesium sulfate IVPB, metoprolol, ondansetron, promethazine (PHENERGAN) IVPB, sodium phosphate IVPB, sodium phosphate IVPB, sodium phosphate IVPB    Review of patient's allergies indicates:   Allergen Reactions    Albuterol Palpitations    Colistin Anaphylaxis    Vancomycin analogues      Infusion reaction that does not resolve with slowing  Pt. States she can tolerate it when given with 50 mg Benadryl and ran over 3 hours    Neupogen [filgrastim] Other (See Comments)     Ostealgia after  five daily doses of 300 mcg.      Bactrim [sulfamethoxazole-trimethoprim] Hives    Ceftazidime Hives     Pt stated can tolerate cefapine not ceftazidime    Ceftazidime     Dronabinol Other (See Comments)     Mental changes/hallucinations    Haldol [haloperidol lactate] Other (See Comments)     Seizure like activity    Nsaids (non-steroidal anti-inflammatory drug)      Cannot have due to lung transplant    Adhesive Rash     Cloth tape- please use tegaderm or paper tape    Aztreonam Rash    Ciprofloxacin Nausea And Vomiting     Projectile N/V, per patient.  Unwilling to retry therapy.       Review of Systems   Unable to perform ROS: Acuity of condition     Objective:   Physical Exam   Constitutional: She appears well-developed and well-nourished. She is cooperative. No distress. Nasal cannula in place.   HENT:   Head: Normocephalic.   Nose: Nose normal.   Eyes: Conjunctivae are normal. No scleral icterus.   Neck: Normal range of motion. Neck supple. No JVD present. No tracheal deviation present.   Cardiovascular: Regular rhythm and normal heart sounds. Tachycardia present. Exam reveals no gallop and no friction rub.   No murmur heard.  Pulmonary/Chest: No respiratory distress. She has decreased breath sounds in the right lower field. She has no wheezes. She has rhonchi in the right lower field. She has no rales.   Abdominal: Soft. Bowel sounds are normal. She exhibits no distension. There is no tenderness.   Genitourinary:   Genitourinary Comments: saleh   Musculoskeletal: Normal range of motion. She exhibits no edema, tenderness or deformity.   Neurological: She is alert.   Follows commands and moves all 4 extremities   Skin: Skin is warm and dry. No erythema. There is pallor.   Psychiatric: Her mood appears anxious.   Nursing note and vitals reviewed.        Vital Signs (Most Recent):  Temp: 97.8 °F (36.6 °C) (08/30/19 1100)  Pulse: (!) 119 (08/30/19 1410)  Resp: (!) 36 (08/30/19 0812)  BP: 127/69 (08/30/19  0902)  SpO2: 96 % (08/30/19 1410) Vital Signs (24h Range):  Temp:  [97.8 °F (36.6 °C)-99.5 °F (37.5 °C)] 97.8 °F (36.6 °C)  Pulse:  [103-154] 119  Resp:  [24-36] 36  SpO2:  [84 %-100 %] 96 %  BP: ()/(52-69) 127/69  Arterial Line BP: ()/(38-86) 112/61     Weight: 50.2 kg (110 lb 10.7 oz)  Body mass index is 21.61 kg/m².      Intake/Output Summary (Last 24 hours) at 8/30/2019 1438  Last data filed at 8/30/2019 1400  Gross per 24 hour   Intake 8700.4 ml   Output 7908 ml   Net 792.4 ml       Ventilator Data:     Vent Mode: Spont  Oxygen Concentration (%):  [] 100  Resp Rate Total:  [16 br/min-34 br/min] 23 br/min  PEEP/CPAP:  [5 cmH20] 5 cmH20  Pressure Support:  [5 cmH20] 5 cmH20  Mean Airway Pressure:  [6.4 nnT43-11 cmH20] 7.6 cmH20    Hemodynamic Parameters:       Lines/Drains:       Hemodialysis Catheter 08/26/19 2225 right femoral (Active)   Site Assessment Clean;Dry;Intact;No redness;No swelling 8/30/2019  3:00 AM   Status Accessed 8/30/2019  3:00 AM   Flows Good 8/30/2019  3:00 AM   Dressing Intervention Dressing changed 8/29/2019 11:00 PM   Dressing Status Biopatch in place;Clean;Dry;Intact 8/30/2019  3:00 AM   Dressing Change Due 09/03/19 8/30/2019  3:00 AM   Verification by X-ray Other (Comment) 8/26/2019 10:30 PM   Site Condition No complications 8/30/2019  3:00 AM   Dressing Occlusive 8/30/2019  3:00 AM   Daily Line Review Performed 8/30/2019  3:00 AM   Number of days: 3            Percutaneous Central Line Insertion/Assessment - triple lumen  08/26/19 1135 left internal jugular (Active)   Dressing biopatch in place;dressing dry and intact 8/30/2019  3:00 AM   Securement secured w/ sutures 8/30/2019  3:00 AM   Additional Site Signs no drainage;no streak formation;no palpable cord;no pain;no edema;no warmth;no erythema 8/30/2019  3:00 AM   Distal Patency/Care infusing 8/30/2019  3:00 AM   Medial Patency/Care infusing 8/30/2019  3:00 AM   Proximal Patency/Care infusing 8/30/2019  3:00 AM    Waveform normal 8/30/2019  3:00 AM   Line Interventions line leveled/zeroed 8/30/2019  3:00 AM   Dressing Change Due 09/02/19 8/30/2019  3:00 AM   Daily Line Review Performed 8/30/2019  3:00 AM   Number of days: 4            Peripheral IV - Single Lumen 08/26/19 1119 20 G Anterior;Right Forearm (Active)   Site Assessment Clean;Dry;Intact;No swelling;No redness 8/30/2019  3:00 AM   Line Status Infusing 8/30/2019  3:00 AM   Dressing Status Clean;Dry;Intact 8/30/2019  3:00 AM   Dressing Intervention Dressing reinforced 8/28/2019  3:00 PM   Dressing Change Due 08/30/19 8/29/2019  3:15 PM   Site Change Due 08/30/19 8/30/2019  3:00 AM   Reason Not Rotated Patient refused 8/30/2019  3:00 AM   Number of days: 4            Arterial Line 08/26/19 1700 Right Femoral (Active)   Site Assessment Clean;Dry;Intact;No redness;No swelling 8/30/2019  3:00 AM   Line Status Pulsatile blood flow 8/30/2019  3:00 AM   Art Line Waveform Appropriate 8/30/2019  3:00 AM   Arterial Line Interventions Zeroed and calibrated;Leveled 8/30/2019  3:00 AM   Color/Movement/Sensation Capillary refill less than 3 sec 8/30/2019  3:00 AM   Dressing Type Transparent 8/30/2019  3:00 AM   Dressing Status Clean;Dry;Intact 8/30/2019  3:00 AM   Dressing Intervention Dressing changed 8/29/2019 11:00 PM   Dressing Change Due 09/03/19 8/30/2019  3:00 AM   Number of days: 3            NG/OG Tube 08/26/19 1140 orogastric 18 Fr. Center mouth (Active)   Placement Check placement verified by x-ray;placement verified by aspirate characteristics 8/30/2019  3:00 AM   Tolerance no signs/symptoms of discomfort 8/30/2019  3:00 AM   Securement secured to commercial device 8/30/2019  3:00 AM   Clamp Status/Tolerance no restlessness;no nausea;no emesis;no abdominal distention;no abdominal discomfort 8/30/2019  3:00 AM   Suction Setting/Drainage Method suction at;low;intermittent setting 8/29/2019  3:15 PM   Insertion Site Appearance no redness, warmth, tenderness, skin  breakdown, drainage 8/30/2019  3:00 AM   Drainage Bile;Tan 8/30/2019  3:00 AM   Flush/Irrigation flushed w/;water;no resistance met 8/30/2019  3:00 AM   Feeding Method continuous 8/30/2019  3:00 AM   Feeding Action feeding continued 8/30/2019  3:00 AM   Current Rate (mL/hr) 30 mL/hr 8/30/2019  3:00 AM   Goal Rate (mL/hr) 40 mL/hr 8/30/2019  3:00 AM   Intake (mL) 60 mL 8/29/2019  9:00 PM   Water Bolus (mL) 20 mL 8/30/2019  3:00 AM   Tube Output(mL)(Include Discarded Residual) 25 mL 8/30/2019  4:00 AM   Intake (mL) - Formula Tube Feeding 0 8/30/2019 10:00 AM   Number of days: 4            NG/OG Tube 08/30/19 1000 Right nostril (Active)   Number of days: 0       Significant Labs:  CBC:  Recent Labs   Lab 08/30/19  0301   WBC 12.71*   RBC 2.73*   HGB 8.3*   HCT 27.2*      *   MCH 30.4   MCHC 30.5*     BMP:  Recent Labs   Lab 08/30/19  1359      K 4.1      CO2 22*   BUN 5*   CREATININE 0.6   CALCIUM 8.3*      Tacrolimus Levels:  Recent Labs   Lab 08/30/19  0301   TACROLIMUS 2.4*     Microbiology:  Microbiology Results (last 7 days)     Procedure Component Value Units Date/Time    Blood culture [[id number]]     Order Status:  No result Specimen:  Blood     Blood culture [[id number]]     Order Status:  No result Specimen:  Blood     Blood culture [[id number]]  (Abnormal) Collected:  08/28/19 1020    Order Status:  Completed Specimen:  Blood from Peripheral, Wrist, Right Updated:  08/30/19 0959     Blood Culture, Routine Gram stain aer bottle: Gram positive cocci in clusters resembling Staph      Results called to and read back by:Lashanda Rios RN 08/29/2019  17:38      STAPHYLOCOCCUS AUREUS  Susceptibility pending  ID consult required at [facility],[place],[facility] and [facility]   locations.      Blood culture [[id number]] Collected:  08/28/19 1250    Order Status:  Completed Specimen:  Blood from Peripheral, Hand, Right Updated:  08/29/19 1612     Blood Culture, Routine No Growth to date      No  Growth to date    Blood culture #1 **CANNOT BE ORDERED STAT** [[id number]]  (Abnormal) Collected:  08/26/19 0848    Order Status:  Completed Specimen:  Blood from Line, Port A Cath Updated:  08/29/19 1111     Blood Culture, Routine Gram stain lisa bottle: Gram positive cocci in clusters resembling Staph       Results called to and read back by:Monet Martinez RN 08/27/2019  06:04      Gram stain aer bottle: Gram positive cocci in clusters resembling Staph       08/28/2019  10:29      METHICILLIN RESISTANT STAPHYLOCOCCUS AUREUS  ID consult required at [facility] and [facility].  For susceptibility see order #[id number]      Blood culture #2 **CANNOT BE ORDERED STAT** [[id number]]  (Abnormal)  (Susceptibility) Collected:  08/26/19 0859    Order Status:  Completed Specimen:  Blood from Wrist, Right Updated:  08/29/19 1110     Blood Culture, Routine Gram stain aer bottle: Gram positive cocci in clusters resembling Staph       Results called to and read back by:Monet Martinez RN 08/27/2019 03:23      Gram stain lisa bottle: Gram positive cocci in clusters resembling Staph       08/27/2019  11:38      METHICILLIN RESISTANT STAPHYLOCOCCUS AUREUS  ID consult required at [facility] and [facility].      Culture, Respiratory [[id number]]  (Abnormal)  (Susceptibility) Collected:  08/26/19 1441    Order Status:  Completed Specimen:  Respiratory from Bronchial Wash Updated:  08/28/19 1213     Respiratory Culture METHICILLIN RESISTANT STAPHYLOCOCCUS AUREUS  Many       Gram Stain (Respiratory) <10 epithelial cells per low power field.     Gram Stain (Respiratory) Few WBC's     Gram Stain (Respiratory) Many Gram positive cocci    Narrative:       Bronchial Wash    Fungus culture [[id number]]  (Abnormal) Collected:  08/26/19 1441    Order Status:  Completed Specimen:  Respiratory from Bronchial Wash Updated:  08/28/19 1101     Fungus (Mycology) Culture YEAST   Many  Identification  pending      Narrative:       Bronchial Wash    AFB Culture & Smear [[id number]] Collected:  08/26/19 1441    Order Status:  Completed Specimen:  Respiratory from Bronchial Wash Updated:  08/27/19 2127     AFB Culture & Smear Culture in progress     AFB CULTURE STAIN No acid fast bacilli seen.    Narrative:       Bronchial Wash    Respiratory Infection Panel, Nasopharyngeal [[id number]] Collected:  08/26/19 1003    Order Status:  Completed Specimen:  Nasopharyngeal Swab Updated:  08/26/19 1344     Respiratory Infection Panel Source NP Swab     Adenovirus Not Detected     Coronavirus 229E Not Detected     Coronavirus HKU1 Not Detected     Coronavirus NL63 Not Detected     Coronavirus OC43 Not Detected     Human Metapneumovirus Not Detected     Human Rhinovirus/Enterovirus Not Detected     Influenza A (subtypes H1, H1-2009,H3) Not Detected     Influenza B Not Detected     Parainfluenza Virus 1 Not Detected     Parainfluenza Virus 2 Not Detected     Parainfluenza Virus 3 Not Detected     Parainfluenza Virus 4 Not Detected     Respiratory Syncytial Virus Not Detected     Bordetella Parapertussis (SE2080) Not Detected     Bordetella pertussis (ptxP) Not Detected     Chlamydia pneumoniae Not Detected     Mycoplasma pneumoniae Not Detected    Narrative:       For all other respiratory sources order [id number] Respiratory  Viral Panel by PCR (RVPCR)    Culture, Respiratory  - Cystic Fibrosis [[id number]]     Order Status:  Canceled Specimen:  Respiratory           I have reviewed all pertinent labs within the past 24 hours.    Diagnostic Results:  Chest X-Ray: There is increased attenuation at the lateral aspect of the right mid and lower lung zones.  I suspect this represents a small amount of right pleural fluid inserting into 1 of the right pleural fissures.  This is consistent with redistribution of right pleural fluid and does not necessarily indicate increasing quantity of right pleural fluid.           Assessment/Plan:      * Septic shock  Multifactorial given staph bacteremia and PNA. History of recurrent  pseudomonal infections and MRSA. Continue Vancomycin for MRSA from BAL and blood cultures. Maintain MAPs >65. Will follow up on cultures and adjust therapy as needed.     Acute respiratory failure  Successfully extubated to HFNC 8/30.  Will begin weaning as tolerated tomorrow.      S/P lung transplant  Underwent bilateral re-transplant for CARLOS EDUARDO on 6/18/19. Continue immunosuppression and prophylaxis.     Immunosuppression  Continue prednisone. Will hold tacrolimus in setting of LISBETH. Daily tacrolimus levels.     Prophylactic antibiotic  Continue voriconazole. 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    Chronic pain with opiate use  Had been holding chronic pain meds, but will slowly reintroduce them to avoid over sedation.      CF related Pancreatic insufficiency  Continue Viokace while on tube feeds.     LISBETH (acute kidney injury)  Started on CRRT for metabolic acidosis and oliguric LISBETH. Will continue to hold nephrotoxic meds. Nephrology following appreciate recs.     Bacteremia  Blood cultures from 8/26 with MRSA. Right chest port removed. Repeat blood cultures NGTD. ID following, appreciate recs. Continue vancomycin.         Preventive Measures: Nutrition: Goal: tube feeds, Head of Bet: elevated    Counseling/Consultation:I discussed the case with Dr. Alvarez.    Reyes Will NP  Lung Transplant  [facility]

## 2019-08-30 NOTE — PROGRESS NOTES
Dr. Mccormack notified of pt increasing pressor requirements to maintain MAP of 65. All assessments, vitals, gtts, CRRT discussed. Orders to continue to titrate as needed noted. WCTM.

## 2019-08-30 NOTE — PROGRESS NOTES
Not able to change wound vac dressing at this time. Nurses attempting to extubate patient.  Will return

## 2019-08-30 NOTE — CARE UPDATE
Patient extubated and placed on 10L HFNC per order. Patient tolerated well. Will continue to monitor.

## 2019-08-30 NOTE — SUBJECTIVE & OBJECTIVE
Interval History: Extubated. Afebrile. Cultures still positive.    Review of Systems   Unable to perform ROS: Acuity of condition     Objective:     Vital Signs (Most Recent):  Temp: 97.8 °F (36.6 °C) (08/30/19 1100)  Pulse: (!) 133 (08/30/19 1540)  Resp: (!) 24 (08/30/19 1531)  BP: 127/69 (08/30/19 0902)  SpO2: (!) 91 % (08/30/19 1540) Vital Signs (24h Range):  Temp:  [97.8 °F (36.6 °C)-99.5 °F (37.5 °C)] 97.8 °F (36.6 °C)  Pulse:  [103-154] 133  Resp:  [24-36] 24  SpO2:  [84 %-100 %] 91 %  BP: ()/(52-69) 127/69  Arterial Line BP: ()/(38-86) 98/47     Weight: 50.2 kg (110 lb 10.7 oz)  Body mass index is 21.61 kg/m².    Estimated Creatinine Clearance: 98.5 mL/min (based on SCr of 0.6 mg/dL).    Physical Exam   Constitutional: She appears well-developed and well-nourished. She has a sickly appearance. No distress.   HENT:   Head: Normocephalic.   Nose: Nose normal.   Eyes: Conjunctivae are normal. No scleral icterus.   Neck: Normal range of motion. Neck supple. No JVD present. No tracheal deviation present.   Cardiovascular: Regular rhythm and normal heart sounds. Tachycardia present. Exam reveals no gallop and no friction rub.   No murmur heard.  Pulmonary/Chest: No respiratory distress. She has decreased breath sounds in the right lower field. She has no wheezes. She has rhonchi in the right upper field, the right middle field, the left upper field and the left middle field. She has no rales.   Abdominal: Soft. Bowel sounds are normal. She exhibits no distension. There is no tenderness.   Genitourinary:   Genitourinary Comments: saleh   Musculoskeletal: Normal range of motion. She exhibits no edema, tenderness or deformity.   Neurological:   Follows commands and moves all 4 extremities   Skin: Skin is warm and dry. No erythema. There is pallor.   Nursing note and vitals reviewed.      Significant Labs:   CBC:   Recent Labs   Lab 08/29/19  0300 08/30/19  0301   WBC 5.71 12.71*   HGB 8.0* 8.3*   HCT 25.7*  27.2*   * 153     CMP:   Recent Labs   Lab 08/29/19  0300  08/29/19  2206 08/30/19  0301 08/30/19  1359      < > 138 138 138   K 4.4   < > 4.3 4.3 4.1      < > 102 103 102   CO2 11*   < > 18* 21* 22*   *   < > 123* 119* 115*   BUN 19   < > 2* 3* 5*   CREATININE 1.2   < > 0.5 0.5 0.6   CALCIUM 8.3*   < > 8.5* 8.6* 8.3*   PROT 7.7  --   --  8.3  --    ALBUMIN 2.5*  2.5*   < > 2.6* 2.7*  2.7* 2.5*   BILITOT 0.3  --   --  0.4  --    ALKPHOS 174*  --   --  195*  --    AST 16  --   --  17  --    ALT 11  --   --  10  --    ANIONGAP 17*   < > 18* 14 14   EGFRNONAA >60.0   < > >60.0 >60.0 >60.0    < > = values in this interval not displayed.       Significant Imaging: I have reviewed all pertinent imaging results/findings within the past 24 hours.

## 2019-08-30 NOTE — SUBJECTIVE & OBJECTIVE
Interval HPI:   Overnight events: Remains in SICU. Extubated this morning.  BG well controlled on IV transition insulin infusion at 0.5 u/hr Rate decreased to 0.3 u/hr this afternoon. Receiving Prednisone 15 mg daily.   Eating:   NPO  Nausea: No  Hypoglycemia and intervention: No  Fever: No  TPN and/or TF: No  If yes, type of TF/TPN and rate: none    BP (!) 78/53 (BP Location: Right arm, Patient Position: Lying)   Pulse (!) 130   Temp 97.8 °F (36.6 °C) (Oral)   Resp (!) 36   Ht 5' (1.524 m)   Wt 50.2 kg (110 lb 10.7 oz)   LMP 10/02/2016 (LMP Unknown)   SpO2 (!) 92%   Breastfeeding? No   BMI 21.61 kg/m²     Labs Reviewed and Include    Recent Labs   Lab 08/30/19  0301   *   CALCIUM 8.6*   ALBUMIN 2.7*  2.7*   PROT 8.3      K 4.3   CO2 21*      BUN 3*   CREATININE 0.5   ALKPHOS 195*   ALT 10   AST 17   BILITOT 0.4     Lab Results   Component Value Date    WBC 12.71 (H) 08/30/2019    HGB 8.3 (L) 08/30/2019    HCT 27.2 (L) 08/30/2019     (H) 08/30/2019     08/30/2019     Recent Labs   Lab 08/26/19  0847   TSH 1.120     Lab Results   Component Value Date    HGBA1C 4.7 01/09/2019       Nutritional status:   Body mass index is 21.61 kg/m².  Lab Results   Component Value Date    ALBUMIN 2.7 (L) 08/30/2019    ALBUMIN 2.7 (L) 08/30/2019    ALBUMIN 2.6 (L) 08/29/2019     Lab Results   Component Value Date    PREALBUMIN 15 (L) 05/29/2014    PREALBUMIN 11 (L) 05/09/2014    PREALBUMIN 18 (L) 03/19/2014       Estimated Creatinine Clearance: 118.2 mL/min (based on SCr of 0.5 mg/dL).    Accu-Checks  Recent Labs     08/29/19  0613 08/29/19  0839 08/29/19  1026 08/29/19  1037 08/29/19  1234 08/29/19  1420 08/29/19  1632 08/29/19 2009 08/30/19  0011 08/30/19  0402   POCTGLUCOSE 129* 148* 92 95 132* 143* 176* 129* 131* 128*       Current Medications and/or Treatments Impacting Glycemic Control  Immunotherapy:    Immunosuppressants     None        Steroids:   Hormones (From admission, onward)     Start     Stop Route Frequency Ordered    08/27/19 0900  predniSONE tablet 15 mg      -- OG Daily 08/26/19 1106    08/26/19 1031  vasopressin (PITRESSIN) 0.2 Units/mL in dextrose 5 % 100 mL infusion      -- IV Continuous 08/26/19 1031        Pressors:    Autonomic Drugs (From admission, onward)    Start     Stop Route Frequency Ordered    08/26/19 1145  norepinephrine 4 mg in dextrose 5% 250 mL infusion (premix) (titrating)     Question Answer Comment   Titrate by: (in mcg/kg/min) 0.05    Titrate interval: (in minutes) 5    Titrate to maintain: (MAP or SBP) MAP    Greater than: (in mmHg) 65    Maximum dose: (in mcg/kg/min) 3        -- IV Continuous 08/26/19 1042        Hyperglycemia/Diabetes Medications:   Antihyperglycemics (From admission, onward)    Start     Stop Route Frequency Ordered    08/29/19 1745  insulin regular (Humulin R) 100 Units in sodium chloride 0.9% 100 mL infusion      -- IV Continuous 08/29/19 1645    08/29/19 1744  insulin aspart U-100 pen 0-4 Units      -- SubQ As needed (PRN) 08/29/19 1645    08/26/19 1515  insulin aspart U-100 (NovoLOG) 100 unit/mL (3 mL) pen     Note to Pharmacy:  Created by cabinet override    08/27 0329   08/26/19 1516

## 2019-08-30 NOTE — ASSESSMENT & PLAN NOTE
BG goal 140 - 180   BG below goal ranges.    Discontinue transition IV insulin infusion (tube feedings discontinued/BG below goal ranges)  Start low dose correction scale prn BG excursions  BG monitoring q 6 hrs while NPO    ** Please call Endocrine for any BG related issues **  ** Please notify Endocrine for any change and/or advance in diet**    Discharge planning: [facility]FOREIGN

## 2019-08-30 NOTE — NURSING
Small emesis episode x2 noted. NG tube to suction on low intermittent. Chambers, NP and Dr Alvarez aware. No new orders placed at this time.

## 2019-08-30 NOTE — PROGRESS NOTES
[facility]  Endocrinology  Progress Note    Admit Date: 8/26/2019     Reason for Consult: Management of Steroid induced Hyperglycemia     Surgical Procedure and Date:   Bilateral re-transplant of Lung    Diabetes diagnosis year: No dx of dm. Steroid induced hyperglycemia secondary to lung transplant treatment.     Home Diabetes Medications:    Januvia 100 mg daily   Novolog 4 units TIDWM    How often checking glucose at home? Not checking  BG readings on regimen: Unknown  Hypoglycemia on the regimen?  No  Missed doses on regimen?  Yes    Diabetes Complications include:     Hyperglycemia    Complicating diabetes co morbidities:   Glucocorticoid use       HPI:   Patient is a 29 y.o. female with a diagnosis of   MsAditi Ibarra is a 30 yo female s/p bilateral re-transplant (6/18/19) who presented to the ED in acute respiratory distress 08/26/2019. Per patient's caregiver, patient was doing well since her hospital discharge, but reports fevers (tmax 101.5) x 2 days, cough with occasional thin sputum, and severe dyspnea requiring >5L oxygen over the weekend. Patient's caregiver also reports poor appetite and PO intake over the last week. Patient has previous history of steroid sensitivity during admission in regards to glycemic control. Endocrinology consulted to manage hyperglycemia during admssion.     Lab Results   Component Value Date    HGBA1C 4.7 01/09/2019       Interval HPI:   Overnight events: Remains in SICU. Extubated this morning.  BG well controlled on IV transition insulin infusion at 0.5 u/hr Rate decreased to 0.3 u/hr this afternoon. Receiving Prednisone 15 mg daily.   Eating:   NPO  Nausea: No  Hypoglycemia and intervention: No  Fever: No  TPN and/or TF: No  If yes, type of TF/TPN and rate: none    BP (!) 78/53 (BP Location: Right arm, Patient Position: Lying)   Pulse (!) 130   Temp 97.8 °F (36.6 °C) (Oral)   Resp (!) 36   Ht 5' (1.524 m)   Wt 50.2 kg (110 lb 10.7 oz)   LMP  10/02/2016 (LMP Unknown)   SpO2 (!) 92%   Breastfeeding? No   BMI 21.61 kg/m²      Labs Reviewed and Include    Recent Labs   Lab 08/30/19  0301   *   CALCIUM 8.6*   ALBUMIN 2.7*  2.7*   PROT 8.3      K 4.3   CO2 21*      BUN 3*   CREATININE 0.5   ALKPHOS 195*   ALT 10   AST 17   BILITOT 0.4     Lab Results   Component Value Date    WBC 12.71 (H) 08/30/2019    HGB 8.3 (L) 08/30/2019    HCT 27.2 (L) 08/30/2019     (H) 08/30/2019     08/30/2019     Recent Labs   Lab 08/26/19  0847   TSH 1.120     Lab Results   Component Value Date    HGBA1C 4.7 01/09/2019       Nutritional status:   Body mass index is 21.61 kg/m².  Lab Results   Component Value Date    ALBUMIN 2.7 (L) 08/30/2019    ALBUMIN 2.7 (L) 08/30/2019    ALBUMIN 2.6 (L) 08/29/2019     Lab Results   Component Value Date    PREALBUMIN 15 (L) 05/29/2014    PREALBUMIN 11 (L) 05/09/2014    PREALBUMIN 18 (L) 03/19/2014       Estimated Creatinine Clearance: 118.2 mL/min (based on SCr of 0.5 mg/dL).    Accu-Checks  Recent Labs     08/29/19  0613 08/29/19  0839 08/29/19  1026 08/29/19  1037 08/29/19  1234 08/29/19  1420 08/29/19  1632 08/29/19  2009 08/30/19  0011 08/30/19  0402   POCTGLUCOSE 129* 148* 92 95 132* 143* 176* 129* 131* 128*       Current Medications and/or Treatments Impacting Glycemic Control  Immunotherapy:    Immunosuppressants     None        Steroids:   Hormones (From admission, onward)    Start     Stop Route Frequency Ordered    08/27/19 0900  predniSONE tablet 15 mg      -- OG Daily 08/26/19 1106    08/26/19 1031  vasopressin (PITRESSIN) 0.2 Units/mL in dextrose 5 % 100 mL infusion      -- IV Continuous 08/26/19 1031        Pressors:    Autonomic Drugs (From admission, onward)    Start     Stop Route Frequency Ordered    08/26/19 1145  norepinephrine 4 mg in dextrose 5% 250 mL infusion (premix) (titrating)     Question Answer Comment   Titrate by: (in mcg/kg/min) 0.05    Titrate interval: (in minutes) 5     Titrate to maintain: (MAP or SBP) MAP    Greater than: (in mmHg) 65    Maximum dose: (in mcg/kg/min) 3        -- IV Continuous 08/26/19 1042        Hyperglycemia/Diabetes Medications:   Antihyperglycemics (From admission, onward)    Start     Stop Route Frequency Ordered    08/29/19 1745  insulin regular (Humulin R) 100 Units in sodium chloride 0.9% 100 mL infusion      -- IV Continuous 08/29/19 1645    08/29/19 1744  insulin aspart U-100 pen 0-4 Units      -- SubQ As needed (PRN) 08/29/19 1645    08/26/19 1515  insulin aspart U-100 (NovoLOG) 100 unit/mL (3 mL) pen     Note to Pharmacy:  Created by cabinet override    08/27 0329 08/26/19 1515          ASSESSMENT and PLAN    * Septic shock  Infection may elevate BG readings  Managed per primary team  Avoid hypoglycemia        Acute hyperglycemia  BG goal 140 - 180   BG below goal ranges.    Discontinue transition IV insulin infusion (tube feedings discontinued/BG below goal ranges)  Start low dose correction scale prn BG excursions  BG monitoring q 6 hrs while NPO    ** Please call Endocrine for any BG related issues **  ** Please notify Endocrine for any change and/or advance in diet**    Discharge planning: TBD        LISBETH (acute kidney injury)  Caution with insulin stacking        Argentina Stevens, KUMAR  Endocrinology  [facility]

## 2019-08-30 NOTE — PROGRESS NOTES
Pt  HR persistently in the 120's. MD notified and discusses, assessments, gtts, vitals. No new orders at this time. WCTM.

## 2019-08-30 NOTE — PLAN OF CARE
Problem: Adult Inpatient Plan of Care  Goal: Plan of Care Review  Spontaneous movement in extremities x4. Patient on continuous sedation and pain medication. Gtts titrated to RASS goal. Vent settings AC/VC 40% FiO2 5 PEEP. Maintaining O2 sat>95. HR 110s-140s, PRNs given to control rate. Titrating gtts to a MAP goal of > 65. TMax 99.4. CRRT SLED UF goal 350. Anuric. Tube feed initiated and well tolerated. Will continue to reassure patient to keep calm and answer questions from family and patient.

## 2019-08-30 NOTE — PROGRESS NOTES
Wound care follow-up  The wound bed is pale pink with thin yellow slough over wound bed, angie-wound is pink/dry/intact.   Wound vac dressing changed as per order, tolerated well.   Nursing to continue to monitor suction/wound care will follow-up miguel a Nunn  RN, [facility]  [id number]       08/30/19 1600        Incision/Site 06/18/19 1300 Right Chest transverse   Date First Assessed/Time First Assessed: 06/18/19 1300   Side: Right  Location: Chest  Orientation: transverse   Incision WDL ex   Dressing Appearance Intact;Clean;Moist drainage   Drainage Amount Scant   Drainage Characteristics/Odor Clear   Appearance Pink;Moist;Not granulating   Red (%), Wound Tissue Color 80 %   Yellow (%), Wound Tissue Color 20 %   Periwound Area Intact;Dry;Pink   Wound Edges Open   Care Cleansed with:;Sterile normal saline;Applied:;Skin Barrier   Dressing Foam;Transparent film;Changed   Dressing Change Due 09/03/19        Negative Pressure Wound Therapy    Placement Date/Time: 08/26/19 1231   Side: Right  Location: Upper quadrant;Chest   NPWT Type Vacuum Therapy   Therapy Setting NPWT Continuous therapy   Pressure Setting NPWT 125 mmHg   Therapy Interventions NPWT Dressing changed;Seal intact   Sponges Inserted NPWT Black;1   Sponges Removed NPWT Black;1   General Output (mL) 0

## 2019-08-30 NOTE — PROGRESS NOTES
[facility]  Nephrology  Progress Note    Patient Name: Juanita Ibarra  MRN: [id number]  Admission Date: 8/26/2019  Hospital Length of Stay: 4 days  Attending Provider: Jake Alvarez MD   Primary Care Physician: Primary Doctor No  Principal Problem:<principal problem not specified>    Subjective:     HPI: A 28 y/o female s/p bilateral re-transplant (6/18/19) secondary to CF, who presented to the ED in acute respiratory distress. Patient's family member reports that she had and episode of fever 2 days ago accompanied by productive cough requiring increasing at home oxygen to ~5L. Also, caregiver reports poor appetite and PO intake over the last week. Upon arrival to ED, oxygen saturations were in mid 70s while on NRB and patient was placed on BiPAP which was transitioned to 25L comfort flow at 100% FiO2. On arrival to ED and worsening clinical status patient was transferred to the ICU, intubated, and started on Zerbaxa/Vancomycin.  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    Nephrology consulted for evaluation of LISBETH and Metabolic acidosis.    Interval History: Patient evaluated bedside, stable vital signs, still remains intubated, currently on SLED.  Night uneventful.  More alert this AM.    Review of patient's allergies indicates:   Allergen Reactions    Albuterol Palpitations    Colistin Anaphylaxis    Vancomycin analogues      Infusion reaction that does not resolve with slowing  Pt. States she can tolerate it when given with 50 mg Benadryl and ran over 3 hours    Neupogen [filgrastim] Other (See Comments)     Ostealgia after five daily doses of 300 mcg.      Bactrim [sulfamethoxazole-trimethoprim] Hives    Ceftazidime Hives     Pt stated can tolerate cefapine not ceftazidime    Ceftazidime     Dronabinol Other (See  Comments)     Mental changes/hallucinations    Haldol [haloperidol lactate] Other (See Comments)     Seizure like activity    Nsaids (non-steroidal anti-inflammatory drug)      Cannot have due to lung transplant    Adhesive Rash     Cloth tape- please use tegaderm or paper tape    Aztreonam Rash    Ciprofloxacin Nausea And Vomiting     Projectile N/V, per patient.  Unwilling to retry therapy.     Current Facility-Administered Medications   Medication Frequency    chlorhexidine 0.12 % solution 15 mL BID    dexmedetomidine (PRECEDEX) 400mcg/100mL 0.9% NaCL infusion Continuous    dextrose 10% (D10W) Bolus PRN    dextrose 10% (D10W) Bolus PRN    diphenhydrAMINE 12.5 mg/5 mL elixir 25 mg Daily PRN    glucagon (human recombinant) injection 1 mg PRN    glucose chewable tablet 16 g PRN    glucose chewable tablet 24 g PRN    heparin (porcine) injection 5,000 Units Q12H    insulin aspart U-100 pen 0-4 Units PRN    insulin regular (Humulin R) 100 Units in sodium chloride 0.9% 100 mL infusion Continuous    levalbuterol nebulizer solution 1.25 mg Q8H    lipase-protease-amylase 10,440-39,150- 39,150 unit Tab 1 tablet Q3H    LORazepam tablet 1 mg Q8H PRN    magnesium sulfate 2g in water 50mL IVPB (premix) PRN    metoprolol injection 10 mg Q5 Min PRN    micafungin 100 mg in sodium chloride 0.9 % 100 mL IVPB (ready to mix system) Q24H    norepinephrine 4 mg in dextrose 5% 250 mL infusion (premix) (titrating) Continuous    ondansetron injection 8 mg Q6H PRN    oxyCODONE immediate release tablet Tab 10 mg Q8H PRN    predniSONE tablet 15 mg Daily    promethazine (PHENERGAN) 12.5 mg in dextrose 5 % 50 mL IVPB Q6H PRN    QUEtiapine tablet 25 mg Daily    QUEtiapine tablet 50 mg QHS    sodium chloride 0.9% bolus 1,000 mL Once    sodium phosphate 20.01 mmol in dextrose 5 % 250 mL IVPB PRN    sodium phosphate 30 mmol in dextrose 5 % 250 mL IVPB PRN    sodium phosphate 39.99 mmol in dextrose 5 % 250 mL IVPB  PRN    topiramate tablet 25 mg BID    vancomycin 1.25 g in dextrose 5% 250 mL IVPB (ready to mix) Once    vasopressin (PITRESSIN) 0.2 Units/mL in dextrose 5 % 100 mL infusion Continuous    voriconazole tablet 200 mg BID       Objective:     Vital Signs (Most Recent):  Temp: 97.8 °F (36.6 °C) (08/30/19 1100)  Pulse: (!) 119 (08/30/19 1410)  Resp: (!) 36 (08/30/19 0812)  BP: 127/69 (08/30/19 0902)  SpO2: 96 % (08/30/19 1410)  O2 Device (Oxygen Therapy): High Flow nasal Cannula (08/30/19 1400) Vital Signs (24h Range):  Temp:  [97.8 °F (36.6 °C)-99.5 °F (37.5 °C)] 97.8 °F (36.6 °C)  Pulse:  [103-154] 119  Resp:  [24-36] 36  SpO2:  [84 %-100 %] 96 %  BP: ()/(52-69) 127/69  Arterial Line BP: ()/(38-86) 112/61     Weight: 50.2 kg (110 lb 10.7 oz) (08/30/19 0500)  Body mass index is 21.61 kg/m².  Body surface area is 1.46 meters squared.    I/O last 3 completed shifts:  In: 9982.8 [I.V.:9452.8; NG/GT:530]  Out: 31014 [Drains:735; Other:42717]    Physical Exam   Constitutional: She appears well-developed. She is sedated and intubated.   on mechanical ventilation   HENT:   Head: Normocephalic and atraumatic.   Eyes: Right eye exhibits no discharge. Left eye exhibits no discharge. No scleral icterus.   Neck: Neck supple.   Cardiovascular: Normal rate and regular rhythm.   Pulmonary/Chest: She is intubated. No respiratory distress. She has no wheezes. She has rales.   Transmitted ventilator sounds   Abdominal: Soft. Bowel sounds are normal.   Musculoskeletal: She exhibits edema. She exhibits no tenderness or deformity.   Neurological: She is alert.   Skin: Skin is warm. There is pallor.   Nursing note and vitals reviewed.      Significant Labs:  CBC:   Recent Labs   Lab 08/30/19  0301   WBC 12.71*   RBC 2.73*   HGB 8.3*   HCT 27.2*      *   MCH 30.4   MCHC 30.5*     CMP:   Recent Labs   Lab 08/30/19  0301 08/30/19  1359   * 115*   CALCIUM 8.6* 8.3*   ALBUMIN 2.7*  2.7* 2.5*   PROT 8.3  --      138   K 4.3 4.1   CO2 21* 22*    102   BUN 3* 5*   CREATININE 0.5 0.6   ALKPHOS 195*  --    ALT 10  --    AST 17  --    BILITOT 0.4  --      All labs within the past 24 hours have been reviewed.     Significant Imaging:  CXR personally reviewed.    Assessment/Plan:     LISBETH (acute kidney injury)  Ms. 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 Her baseline SCr in the past year has been around ~ 1.0. LISBETH likely secondary to ATN caused by hypotension/sepsis in the setting of severe metabolic acidosis.    Recommendations:    - Will continue SLED for metabolic clearance and volume management, target -300 ml/hr as tolerated.   - Primary team has plans to extubate this AM; will increase bicarb to help patient be slightly on the alkalotic side rather than acidotic which will increase respiratory rate  - Strict I/O and chart  - Avoid nephrotoxic medications, NSAIDs, IV contrast, etc.  - Medication doses adjusted to GFR  - Maintain MAP > 65  - Will follow closely      Septic shock  - Being managed by primary team     S/P lung transplant  Managed by primary team        Thank you for your consult. I will follow-up with patient. Please contact us if you have any additional questions.    Tutu Faith MD  Nephrology  [facility]      ATTENDING PHYSICIAN ATTESTATION  I have personally assessed and examined the patient. I thoroughly reviewed the demographic, clinical, laboratorial and imaging information available in medical records. I agree with the assessment and recommendations provided by the subspecialty resident. Dr. Medrano was under my supervision.     DIALYSIS NOTE  Patient evaluated while undergoing Slow Low Efficiency Dialysis (SLED) indicated for LISBETH and hemodynamic instability. No complications, tolerating session with current UFR. Will continue current support.

## 2019-08-30 NOTE — SUBJECTIVE & OBJECTIVE
Subjective:     Interval History: No acute events overnight.  Successfully extubated today and tolerating HFNC.      Continuous Infusions:   dexmedetomidine (PRECEDEX) infusion Stopped (08/30/19 1300)    insulin (HUMAN R) infusion (adults) 0.3 Units/hr (08/30/19 1224)    norepinephrine bitartrate-D5W Stopped (08/30/19 0919)    vasopressin (PITRESSIN) infusion 0.04 Units/min (08/30/19 1400)     Scheduled Meds:   chlorhexidine  15 mL Mouth/Throat BID    heparin (porcine)  5,000 Units Subcutaneous Q12H    levalbuterol  1.25 mg Nebulization Q8H    lipase-protease-amylase  1 tablet Per OG tube Q3H    micafungin (MYCAMINE) IVPB  100 mg Intravenous Q24H    predniSONE  15 mg Per OG tube Daily    QUEtiapine  25 mg Per OG tube Daily    QUEtiapine  50 mg Per OG tube QHS    sodium chloride 0.9%  1,000 mL Intravenous Once    topiramate  25 mg Per OG tube BID    vancomycin (VANCOCIN) IVPB  1,250 mg Intravenous Once    voriconazole  200 mg Per OG tube BID     PRN Meds:Dextrose 10% Bolus, Dextrose 10% Bolus, diphenhydrAMINE, glucagon (human recombinant), glucose, glucose, insulin aspart U-100, LORazepam, magnesium sulfate IVPB, metoprolol, ondansetron, promethazine (PHENERGAN) IVPB, sodium phosphate IVPB, sodium phosphate IVPB, sodium phosphate IVPB    Review of patient's allergies indicates:   Allergen Reactions    Albuterol Palpitations    Colistin Anaphylaxis    Vancomycin analogues      Infusion reaction that does not resolve with slowing  Pt. States she can tolerate it when given with 50 mg Benadryl and ran over 3 hours    Neupogen [filgrastim] Other (See Comments)     Ostealgia after five daily doses of 300 mcg.      Bactrim [sulfamethoxazole-trimethoprim] Hives    Ceftazidime Hives     Pt stated can tolerate cefapine not ceftazidime    Ceftazidime     Dronabinol Other (See Comments)     Mental changes/hallucinations    Haldol [haloperidol lactate] Other (See Comments)     Seizure like activity     Nsaids (non-steroidal anti-inflammatory drug)      Cannot have due to lung transplant    Adhesive Rash     Cloth tape- please use tegaderm or paper tape    Aztreonam Rash    Ciprofloxacin Nausea And Vomiting     Projectile N/V, per patient.  Unwilling to retry therapy.       Review of Systems   Unable to perform ROS: Acuity of condition     Objective:   Physical Exam   Constitutional: She appears well-developed and well-nourished. She is cooperative. No distress. Nasal cannula in place.   HENT:   Head: Normocephalic.   Nose: Nose normal.   Eyes: Conjunctivae are normal. No scleral icterus.   Neck: Normal range of motion. Neck supple. No JVD present. No tracheal deviation present.   Cardiovascular: Regular rhythm and normal heart sounds. Tachycardia present. Exam reveals no gallop and no friction rub.   No murmur heard.  Pulmonary/Chest: No respiratory distress. She has decreased breath sounds in the right lower field. She has no wheezes. She has rhonchi in the right lower field. She has no rales.   Abdominal: Soft. Bowel sounds are normal. She exhibits no distension. There is no tenderness.   Genitourinary:   Genitourinary Comments: saleh   Musculoskeletal: Normal range of motion. She exhibits no edema, tenderness or deformity.   Neurological: She is alert.   Follows commands and moves all 4 extremities   Skin: Skin is warm and dry. No erythema. There is pallor.   Psychiatric: Her mood appears anxious.   Nursing note and vitals reviewed.        Vital Signs (Most Recent):  Temp: 97.8 °F (36.6 °C) (08/30/19 1100)  Pulse: (!) 119 (08/30/19 1410)  Resp: (!) 36 (08/30/19 0812)  BP: 127/69 (08/30/19 0902)  SpO2: 96 % (08/30/19 1410) Vital Signs (24h Range):  Temp:  [97.8 °F (36.6 °C)-99.5 °F (37.5 °C)] 97.8 °F (36.6 °C)  Pulse:  [103-154] 119  Resp:  [24-36] 36  SpO2:  [84 %-100 %] 96 %  BP: ()/(52-69) 127/69  Arterial Line BP: ()/(38-86) 112/61     Weight: 50.2 kg (110 lb 10.7 oz)  Body mass index is 21.61  kg/m².      Intake/Output Summary (Last 24 hours) at 8/30/2019 1438  Last data filed at 8/30/2019 1400  Gross per 24 hour   Intake 8700.4 ml   Output 7908 ml   Net 792.4 ml       Ventilator Data:     Vent Mode: Spont  Oxygen Concentration (%):  [] 100  Resp Rate Total:  [16 br/min-34 br/min] 23 br/min  PEEP/CPAP:  [5 cmH20] 5 cmH20  Pressure Support:  [5 cmH20] 5 cmH20  Mean Airway Pressure:  [6.4 erF90-75 cmH20] 7.6 cmH20    Hemodynamic Parameters:       Lines/Drains:       Hemodialysis Catheter 08/26/19 2225 right femoral (Active)   Site Assessment Clean;Dry;Intact;No redness;No swelling 8/30/2019  3:00 AM   Status Accessed 8/30/2019  3:00 AM   Flows Good 8/30/2019  3:00 AM   Dressing Intervention Dressing changed 8/29/2019 11:00 PM   Dressing Status Biopatch in place;Clean;Dry;Intact 8/30/2019  3:00 AM   Dressing Change Due 09/03/19 8/30/2019  3:00 AM   Verification by X-ray Other (Comment) 8/26/2019 10:30 PM   Site Condition No complications 8/30/2019  3:00 AM   Dressing Occlusive 8/30/2019  3:00 AM   Daily Line Review Performed 8/30/2019  3:00 AM   Number of days: 3            Percutaneous Central Line Insertion/Assessment - triple lumen  08/26/19 1135 left internal jugular (Active)   Dressing biopatch in place;dressing dry and intact 8/30/2019  3:00 AM   Securement secured w/ sutures 8/30/2019  3:00 AM   Additional Site Signs no drainage;no streak formation;no palpable cord;no pain;no edema;no warmth;no erythema 8/30/2019  3:00 AM   Distal Patency/Care infusing 8/30/2019  3:00 AM   Medial Patency/Care infusing 8/30/2019  3:00 AM   Proximal Patency/Care infusing 8/30/2019  3:00 AM   Waveform normal 8/30/2019  3:00 AM   Line Interventions line leveled/zeroed 8/30/2019  3:00 AM   Dressing Change Due 09/02/19 8/30/2019  3:00 AM   Daily Line Review Performed 8/30/2019  3:00 AM   Number of days: 4            Peripheral IV - Single Lumen 08/26/19 1119 20 G Anterior;Right Forearm (Active)   Site Assessment  Clean;Dry;Intact;No swelling;No redness 8/30/2019  3:00 AM   Line Status Infusing 8/30/2019  3:00 AM   Dressing Status Clean;Dry;Intact 8/30/2019  3:00 AM   Dressing Intervention Dressing reinforced 8/28/2019  3:00 PM   Dressing Change Due 08/30/19 8/29/2019  3:15 PM   Site Change Due 08/30/19 8/30/2019  3:00 AM   Reason Not Rotated Patient refused 8/30/2019  3:00 AM   Number of days: 4            Arterial Line 08/26/19 1700 Right Femoral (Active)   Site Assessment Clean;Dry;Intact;No redness;No swelling 8/30/2019  3:00 AM   Line Status Pulsatile blood flow 8/30/2019  3:00 AM   Art Line Waveform Appropriate 8/30/2019  3:00 AM   Arterial Line Interventions Zeroed and calibrated;Leveled 8/30/2019  3:00 AM   Color/Movement/Sensation Capillary refill less than 3 sec 8/30/2019  3:00 AM   Dressing Type Transparent 8/30/2019  3:00 AM   Dressing Status Clean;Dry;Intact 8/30/2019  3:00 AM   Dressing Intervention Dressing changed 8/29/2019 11:00 PM   Dressing Change Due 09/03/19 8/30/2019  3:00 AM   Number of days: 3            NG/OG Tube 08/26/19 1140 orogastric 18 Fr. Center mouth (Active)   Placement Check placement verified by x-ray;placement verified by aspirate characteristics 8/30/2019  3:00 AM   Tolerance no signs/symptoms of discomfort 8/30/2019  3:00 AM   Securement secured to commercial device 8/30/2019  3:00 AM   Clamp Status/Tolerance no restlessness;no nausea;no emesis;no abdominal distention;no abdominal discomfort 8/30/2019  3:00 AM   Suction Setting/Drainage Method suction at;low;intermittent setting 8/29/2019  3:15 PM   Insertion Site Appearance no redness, warmth, tenderness, skin breakdown, drainage 8/30/2019  3:00 AM   Drainage Bile;Tan 8/30/2019  3:00 AM   Flush/Irrigation flushed w/;water;no resistance met 8/30/2019  3:00 AM   Feeding Method continuous 8/30/2019  3:00 AM   Feeding Action feeding continued 8/30/2019  3:00 AM   Current Rate (mL/hr) 30 mL/hr 8/30/2019  3:00 AM   Goal Rate (mL/hr) 40 mL/hr  8/30/2019  3:00 AM   Intake (mL) 60 mL 8/29/2019  9:00 PM   Water Bolus (mL) 20 mL 8/30/2019  3:00 AM   Tube Output(mL)(Include Discarded Residual) 25 mL 8/30/2019  4:00 AM   Intake (mL) - Formula Tube Feeding 0 8/30/2019 10:00 AM   Number of days: 4            NG/OG Tube 08/30/19 1000 Right nostril (Active)   Number of days: 0       Significant Labs:  CBC:  Recent Labs   Lab 08/30/19  0301   WBC 12.71*   RBC 2.73*   HGB 8.3*   HCT 27.2*      *   MCH 30.4   MCHC 30.5*     BMP:  Recent Labs   Lab 08/30/19  1359      K 4.1      CO2 22*   BUN 5*   CREATININE 0.6   CALCIUM 8.3*      Tacrolimus Levels:  Recent Labs   Lab 08/30/19  0301   TACROLIMUS 2.4*     Microbiology:  Microbiology Results (last 7 days)     Procedure Component Value Units Date/Time    Blood culture [[id number]]     Order Status:  No result Specimen:  Blood     Blood culture [[id number]]     Order Status:  No result Specimen:  Blood     Blood culture [[id number]]  (Abnormal) Collected:  08/28/19 1020    Order Status:  Completed Specimen:  Blood from Peripheral, Wrist, Right Updated:  08/30/19 0959     Blood Culture, Routine Gram stain aer bottle: Gram positive cocci in clusters resembling Staph      Results called to and read back by:Lashanda Rios RN 08/29/2019  17:38      STAPHYLOCOCCUS AUREUS  Susceptibility pending  [facility] and [facility].      Blood culture [[id number]] Collected:  08/28/19 1250    Order Status:  Completed Specimen:  Blood from Peripheral, Hand, Right Updated:  08/29/19 1612     Blood Culture, Routine No Growth to date      No Growth to date    Blood culture #1 **CANNOT BE ORDERED STAT** [[id number]]  (Abnormal) Collected:  08/26/19 0848    Order Status:  Completed Specimen:  Blood from Line, Port A Cath Updated:  08/29/19 1111     Blood Culture, Routine Gram stain lisa bottle: Gram positive cocci in clusters resembling Staph       Results called to and  read back by:Monet Martinez RN 08/27/2019  06:04      Gram stain aer bottle: Gram positive cocci in clusters resembling Staph       08/28/2019  10:29      METHICILLIN RESISTANT STAPHYLOCOCCUS AUREUS  ID consult required at [facility] and [facility].  For susceptibility see order #[id number]      Blood culture #2 **CANNOT BE ORDERED STAT** [[id number]]  (Abnormal)  (Susceptibility) Collected:  08/26/19 0859    Order Status:  Completed Specimen:  Blood from Wrist, Right Updated:  08/29/19 1110     Blood Culture, Routine Gram stain aer bottle: Gram positive cocci in clusters resembling Staph       Results called to and read back by:Monet Martinez RN 08/27/2019 03:23      Gram stain lisa bottle: Gram positive cocci in clusters resembling Staph       08/27/2019  11:38      METHICILLIN RESISTANT STAPHYLOCOCCUS AUREUS  ID consult required at [facility] and [facility].      Culture, Respiratory [[id number]]  (Abnormal)  (Susceptibility) Collected:  08/26/19 1441    Order Status:  Completed Specimen:  Respiratory from Bronchial Wash Updated:  08/28/19 1213     Respiratory Culture METHICILLIN RESISTANT STAPHYLOCOCCUS AUREUS  Many       Gram Stain (Respiratory) <10 epithelial cells per low power field.     Gram Stain (Respiratory) Few WBC's     Gram Stain (Respiratory) Many Gram positive cocci    Narrative:       Bronchial Wash    Fungus culture [[id number]]  (Abnormal) Collected:  08/26/19 1441    Order Status:  Completed Specimen:  Respiratory from Bronchial Wash Updated:  08/28/19 1101     Fungus (Mycology) Culture YEAST   Many  Identification pending      Narrative:       Bronchial Wash    AFB Culture & Smear [[id number]] Collected:  08/26/19 1441    Order Status:  Completed Specimen:  Respiratory from Bronchial Wash Updated:  08/27/19 2127     AFB Culture & Smear Culture in progress     AFB CULTURE STAIN No acid fast bacilli seen.    Narrative:       Bronchial Wash     Respiratory Infection Panel, Nasopharyngeal [[id number]] Collected:  08/26/19 1003    Order Status:  Completed Specimen:  Nasopharyngeal Swab Updated:  08/26/19 1344     Respiratory Infection Panel Source NP Swab     Adenovirus Not Detected     Coronavirus 229E Not Detected     Coronavirus HKU1 Not Detected     Coronavirus NL63 Not Detected     Coronavirus OC43 Not Detected     Human Metapneumovirus Not Detected     Human Rhinovirus/Enterovirus Not Detected     Influenza A (subtypes H1, H1-2009,H3) Not Detected     Influenza B Not Detected     Parainfluenza Virus 1 Not Detected     Parainfluenza Virus 2 Not Detected     Parainfluenza Virus 3 Not Detected     Parainfluenza Virus 4 Not Detected     Respiratory Syncytial Virus Not Detected     Bordetella Parapertussis (EX5750) Not Detected     Bordetella pertussis (ptxP) Not Detected     Chlamydia pneumoniae Not Detected     Mycoplasma pneumoniae Not Detected    Narrative:       For all other respiratory sources order [id number] Respiratory  Viral Panel by PCR (RVPCR)    Culture, Respiratory  - Cystic Fibrosis [[id number]]     Order Status:  Canceled Specimen:  Respiratory           I have reviewed all pertinent labs within the past 24 hours.    Diagnostic Results:  Chest X-Ray: There is increased attenuation at the lateral aspect of the right mid and lower lung zones.  I suspect this represents a small amount of right pleural fluid inserting into 1 of the right pleural fissures.  This is consistent with redistribution of right pleural fluid and does not necessarily indicate increasing quantity of right pleural fluid.

## 2019-08-31 NOTE — CARE UPDATE
Pt. place in prone position per MD. Dr. Alvarez in room during proning.   Ambu bagging throughout procedure. Pt. tolerated well. SpO2 at 97%.  ETT remains secure and intact 22 cm at lips; head turned to right side; will continue to monitor.

## 2019-08-31 NOTE — SUBJECTIVE & OBJECTIVE
Subjective:     Interval History: Events from last night noted.  S/P PEA arrest.  Continue supportive care    Continuous Infusions:   sodium chloride 0.9% 200 mL/hr at 08/30/19 2106    angiotensin II 10 ng/kg/min (08/30/19 2343)    cisatracurium (NIMBEX) infusion 2 mcg/kg/min (08/31/19 1200)    dexmedetomidine (PRECEDEX) infusion Stopped (08/30/19 1800)    fentanyl 150 mcg/hr (08/31/19 1200)    heparin (porcine) in 5 % dex      midazolam (VERSED) infusion (non-titrating) 3 mg/hr (08/31/19 1200)    norepinephrine bitartrate-D5W 2.4 mcg/kg/min (08/31/19 1200)    phenylephrine 0.5 mcg/kg/min (08/31/19 1200)    sodium bicarbonate drip 125 mL/hr at 08/31/19 1200    vasopressin (PITRESSIN) infusion 0.08 Units/min (08/31/19 1200)     Scheduled Meds:   ceftolozane-tazobactam  1,500 mg Intravenous Q8H    chlorhexidine  15 mL Mouth/Throat BID    heparin (porcine)  5,000 Units Subcutaneous Q12H    levalbuterol  1.25 mg Nebulization Q8H    lipase-protease-amylase  1 tablet Per OG tube Q3H    micafungin (MYCAMINE) IVPB  100 mg Intravenous Q24H    predniSONE  15 mg Per OG tube Daily    QUEtiapine  25 mg Per OG tube Daily    QUEtiapine  50 mg Per OG tube QHS    sodium chloride 0.9%  1,000 mL Intravenous Once    sodium chloride 0.9%  500 mL Intravenous Once    topiramate  25 mg Per OG tube BID    voriconazole  200 mg Per OG tube BID     PRN Meds:sodium chloride, sodium chloride, dextrose 50%, diphenhydrAMINE, diphenhydrAMINE, glucagon (human recombinant), HYDROmorphone, insulin aspart U-100, LORazepam, magnesium sulfate IVPB, metoprolol, ondansetron, oxyCODONE, promethazine (PHENERGAN) IVPB, sodium phosphate IVPB, sodium phosphate IVPB, sodium phosphate IVPB    Review of patient's allergies indicates:   Allergen Reactions    Albuterol Palpitations    Colistin Anaphylaxis    Vancomycin analogues      Infusion reaction that does not resolve with slowing  Pt. States she can tolerate it when given with 50 mg  Benadryl and ran over 3 hours    Neupogen [filgrastim] Other (See Comments)     Ostealgia after five daily doses of 300 mcg.      Bactrim [sulfamethoxazole-trimethoprim] Hives    Ceftazidime Hives     Pt stated can tolerate cefapine not ceftazidime    Ceftazidime     Dronabinol Other (See Comments)     Mental changes/hallucinations    Haldol [haloperidol lactate] Other (See Comments)     Seizure like activity    Nsaids (non-steroidal anti-inflammatory drug)      Cannot have due to lung transplant    Adhesive Rash     Cloth tape- please use tegaderm or paper tape    Aztreonam Rash    Ciprofloxacin Nausea And Vomiting     Projectile N/V, per patient.  Unwilling to retry therapy.       Review of Systems   Unable to perform ROS: Intubated     Objective:   Physical Exam   Constitutional: She appears well-developed and well-nourished. She appears ill. She is sedated, chemically paralyzed and intubated.   HENT:   Head: Normocephalic and atraumatic.   ET and OG tubes in place   Eyes: Right eye exhibits no discharge. Left eye exhibits no discharge. No scleral icterus.   Neck: Neck supple.   Cardiovascular: Normal rate and regular rhythm.   Pulmonary/Chest: She is intubated. No respiratory distress. She has no wheezes. She has rales.   Mechanical ventilator sounds   Abdominal: Soft. Bowel sounds are normal.   Musculoskeletal: She exhibits edema. She exhibits no tenderness or deformity.   Skin: Skin is warm. There is pallor.   Right leg with mottling noted.   Nursing note and vitals reviewed.        Vital Signs (Most Recent):  Temp: (!) 103.5 °F (39.7 °C) (08/31/19 1200)  Pulse: (!) 162 (08/31/19 1205)  Resp: (!) 35 (08/31/19 0836)  BP: 117/67 (08/31/19 0930)  SpO2: (!) 71 % (08/31/19 1115) Vital Signs (24h Range):  Temp:  [98.4 °F (36.9 °C)-103.5 °F (39.7 °C)] 103.5 °F (39.7 °C)  Pulse:  [116-173] 162  Resp:  [24-35] 35  SpO2:  [61 %-100 %] 71 %  BP: ()/(41-85) 117/67  Arterial Line BP: ()/(30-68) 109/48      Weight: 50.2 kg (110 lb 10.7 oz)  Body mass index is 21.61 kg/m².      Intake/Output Summary (Last 24 hours) at 8/31/2019 1300  Last data filed at 8/31/2019 1200  Gross per 24 hour   Intake 8404.33 ml   Output 1217 ml   Net 7187.33 ml       Ventilator Data:     Vent Mode: A/C  Oxygen Concentration (%):  [] 98  Resp Rate Total:  [0 br/min-44 br/min] 35 br/min  Vt Set:  [280 mL-300 mL] 280 mL  PEEP/CPAP:  [10 cmH20-15 cmH20] 15 cmH20  Mean Airway Pressure:  [19 kyE65-70 cmH20] 24 cmH20    Hemodynamic Parameters:       Lines/Drains:       Percutaneous Central Line Insertion/Assessment - triple lumen  08/26/19 1135 left internal jugular (Active)   Dressing biopatch in place;dressing dry and intact 8/31/2019 11:15 AM   Securement secured w/ sutures 8/31/2019 11:15 AM   Additional Site Signs no erythema;no warmth;no edema;no pain;no palpable cord;no streak formation;no drainage 8/31/2019 11:15 AM   Distal Patency/Care infusing 8/31/2019 11:15 AM   Medial Patency/Care infusing 8/31/2019 11:15 AM   Proximal Patency/Care infusing 8/31/2019 11:15 AM   Waveform normal 8/30/2019  3:15 PM   Line Interventions line leveled/zeroed 8/31/2019 11:15 AM   Dressing Change Due 09/02/19 8/31/2019 11:15 AM   Daily Line Review Performed 8/31/2019 11:15 AM   Number of days: 5            Trialysis (Dialysis) Catheter 08/31/19 0110 right femoral (Active)   IV Device Securement sutures 8/31/2019 11:15 AM   Additional Site Signs no erythema;no warmth;no edema;no pain;no palpable cord;no streak formation;no drainage 8/31/2019 11:15 AM   Patency/Care flushed w/o difficulty;blood return not present 8/31/2019 11:15 AM   Site Assessment Clean;Dry;Intact;No redness;No swelling 8/31/2019 11:15 AM   Status Clamped 8/31/2019 11:15 AM   Dressing Intervention New dressing 8/31/2019  3:00 AM   Dressing Status Biopatch in place;Clean;Dry;Intact 8/31/2019 11:15 AM   Dressing Change Due 09/07/19 8/31/2019  3:00 AM   Site Condition No complications  8/31/2019  3:00 AM   Dressing Occlusive 8/31/2019 11:15 AM   Daily Line Review Performed 8/31/2019 11:15 AM   Number of days: 0            Peripheral IV - Single Lumen 08/26/19 1119 20 G Anterior;Right Forearm (Active)   Site Assessment Clean;Dry;Intact;No redness;No swelling 8/31/2019 11:15 AM   Line Status Infusing 8/31/2019 11:15 AM   Dressing Status Clean;Dry;Intact 8/31/2019 11:15 AM   Dressing Intervention Dressing reinforced 8/28/2019  3:00 PM   Dressing Change Due 08/30/19 8/31/2019  3:00 AM   Site Change Due 08/30/19 8/31/2019  7:15 AM   Reason Not Rotated Poor venous access 8/31/2019 11:15 AM   Number of days: 5            Peripheral IV - Single Lumen 08/30/19 1600 20 G Right Forearm (Active)   Site Assessment Clean;Dry;Intact;No redness;No swelling 8/31/2019 11:15 AM   Line Status Infusing 8/31/2019 11:15 AM   Dressing Status Clean;Dry;Intact 8/31/2019 11:15 AM   Dressing Change Due 09/03/19 8/31/2019 11:15 AM   Site Change Due 09/03/19 8/31/2019  7:15 AM   Reason Not Rotated Not due 8/31/2019 11:15 AM   Number of days: 0            Arterial Line 08/31/19 0120 Left Femoral (Active)   Site Assessment Clean;Dry;Intact;No redness;No swelling 8/31/2019 11:15 AM   Line Status Pulsatile blood flow 8/31/2019 11:15 AM   Art Line Waveform Appropriate 8/31/2019 11:15 AM   Arterial Line Interventions Zeroed and calibrated;Leveled 8/31/2019 11:15 AM   Color/Movement/Sensation Cool fingers/toes;Dusky fingers/toes 8/31/2019 11:15 AM   Dressing Type Transparent 8/31/2019 11:15 AM   Dressing Status Clean;Dry;Intact 8/31/2019 11:15 AM   Dressing Intervention New dressing 8/31/2019  3:00 AM   Dressing Change Due 09/03/19 8/31/2019  3:00 AM   Number of days: 0            NG/OG Tube 08/30/19 1000 Right nostril (Active)   Placement Check placement verified by x-ray 8/31/2019 11:15 AM   Tolerance no signs/symptoms of discomfort 8/31/2019 11:15 AM   Securement secured to nostril center w/ adhesive device 8/31/2019 11:15 AM    Clamp Status/Tolerance unclamped 8/31/2019 11:15 AM   Suction Setting/Drainage Method suction at;low;intermittent setting 8/31/2019 11:15 AM   Insertion Site Appearance no redness, warmth, tenderness, skin breakdown, drainage 8/31/2019 11:15 AM   Drainage Bile;Green;Brown 8/31/2019 11:15 AM   Flush/Irrigation flushed w/;water;no resistance met 8/31/2019 11:15 AM   Intake (mL) 60 mL 8/31/2019 12:00 PM   Number of days: 1       Significant Labs:  CBC:  Recent Labs   Lab 08/31/19  1151   WBC 17.10*   RBC 4.15   HGB 12.0   HCT 37.2   PLT 69*   MCV 90   MCH 28.9   MCHC 32.3     BMP:  Recent Labs   Lab 08/31/19  0400      K 3.7   CL 97   CO2 25   BUN 11   CREATININE 1.1   CALCIUM 6.0*      Tacrolimus Levels:  Recent Labs   Lab 08/31/19  0400   TACROLIMUS 2.0*     Microbiology:  Microbiology Results (last 7 days)     Procedure Component Value Units Date/Time    Blood culture [[id number]]  (Abnormal)  (Susceptibility) Collected:  08/28/19 1020    Order Status:  Completed Specimen:  Blood from Peripheral, Wrist, Right Updated:  08/31/19 1043     Blood Culture, Routine Gram stain aer bottle: Gram positive cocci in clusters resembling Staph      Results called to and read back by:Lashanda Rios RN 08/29/2019  17:38      METHICILLIN RESISTANT STAPHYLOCOCCUS AUREUS  ID consult required at [facility] and [facility].      Blood culture [[id number]] Collected:  08/30/19 1445    Order Status:  Completed Specimen:  Blood from Peripheral, Forearm, Right Updated:  08/31/19 0115     Blood Culture, Routine No Growth to date    Blood culture [[id number]] Collected:  08/30/19 1450    Order Status:  Completed Specimen:  Blood from Peripheral, Hand, Left Updated:  08/31/19 0115     Blood Culture, Routine No Growth to date    Blood culture [[id number]] Collected:  08/28/19 1250    Order Status:  Completed Specimen:  Blood from Peripheral, Hand, Right Updated:  08/30/19 1612     Blood Culture, Routine No  Growth to date      No Growth to date      No Growth to date    Blood culture #1 **CANNOT BE ORDERED STAT** [[id number]]  (Abnormal) Collected:  08/26/19 0848    Order Status:  Completed Specimen:  Blood from Line, Port A Cath Updated:  08/29/19 1111     Blood Culture, Routine Gram stain lisa bottle: Gram positive cocci in clusters resembling Staph       Results called to and read back by:Monet Martinez RN 08/27/2019  06:04      Gram stain aer bottle: Gram positive cocci in clusters resembling Staph       08/28/2019  10:29      METHICILLIN RESISTANT STAPHYLOCOCCUS AUREUS  ID consult required at [facility].  For susceptibility see order #[id number]      Blood culture #2 **CANNOT BE ORDERED STAT** [[id number]]  (Abnormal)  (Susceptibility) Collected:  08/26/19 0859    Order Status:  Completed Specimen:  Blood from Wrist, Right Updated:  08/29/19 1110     Blood Culture, Routine Gram stain aer bottle: Gram positive cocci in clusters resembling Staph       Results called to and read back by:Monet Martinez RN 08/27/2019 03:23      Gram stain lisa bottle: Gram positive cocci in clusters resembling Staph       08/27/2019  11:38      METHICILLIN RESISTANT STAPHYLOCOCCUS AUREUS  ID consult required at [facility] and [facility].      Culture, Respiratory [[id number]]  (Abnormal)  (Susceptibility) Collected:  08/26/19 1441    Order Status:  Completed Specimen:  Respiratory from Bronchial Wash Updated:  08/28/19 1213     Respiratory Culture METHICILLIN RESISTANT STAPHYLOCOCCUS AUREUS  Many       Gram Stain (Respiratory) <10 epithelial cells per low power field.     Gram Stain (Respiratory) Few WBC's     Gram Stain (Respiratory) Many Gram positive cocci    Narrative:       Bronchial Wash    Fungus culture [[id number]]  (Abnormal) Collected:  08/26/19 1441    Order Status:  Completed Specimen:  Respiratory from Bronchial Wash Updated:  08/28/19 1101     Fungus  (Mycology) Culture YEAST   Many  Identification pending      Narrative:       Bronchial Wash    AFB Culture & Smear [[id number]] Collected:  08/26/19 1441    Order Status:  Completed Specimen:  Respiratory from Bronchial Wash Updated:  08/27/19 2127     AFB Culture & Smear Culture in progress     AFB CULTURE STAIN No acid fast bacilli seen.    Narrative:       Bronchial Wash    Respiratory Infection Panel, Nasopharyngeal [[id number]] Collected:  08/26/19 1003    Order Status:  Completed Specimen:  Nasopharyngeal Swab Updated:  08/26/19 1344     Respiratory Infection Panel Source NP Swab     Adenovirus Not Detected     Coronavirus 229E Not Detected     Coronavirus HKU1 Not Detected     Coronavirus NL63 Not Detected     Coronavirus OC43 Not Detected     Human Metapneumovirus Not Detected     Human Rhinovirus/Enterovirus Not Detected     Influenza A (subtypes H1, H1-2009,H3) Not Detected     Influenza B Not Detected     Parainfluenza Virus 1 Not Detected     Parainfluenza Virus 2 Not Detected     Parainfluenza Virus 3 Not Detected     Parainfluenza Virus 4 Not Detected     Respiratory Syncytial Virus Not Detected     Bordetella Parapertussis (JN0222) Not Detected     Bordetella pertussis (ptxP) Not Detected     Chlamydia pneumoniae Not Detected     Mycoplasma pneumoniae Not Detected    Narrative:       For all other respiratory sources order [id number] Respiratory  Viral Panel by PCR (RVPCR)    Culture, Respiratory  - Cystic Fibrosis [[id number]]     Order Status:  Canceled Specimen:  Respiratory           I have reviewed all pertinent labs within the past 24 hours.    Diagnostic Results:  Chest X-Ray: There has not been a significant change in the cardiopulmonary status

## 2019-08-31 NOTE — PROGRESS NOTES
Came to bedside to restart CRRT. Unable to start due to catheter malfunction. Patient still prone, unable to aspirate from both ports post line exchange.

## 2019-08-31 NOTE — NURSING
Pt extubated to 10L high flow nasal canula. Dr. Alvarez at bedside. Pt tolerating well. Goal Sp02 >90% and pt maintaining. Will continue to monitor.

## 2019-08-31 NOTE — PROGRESS NOTES
Dr. Alvarez notified of pt maxed out on pressors (Levo@ 3 mcg/kg/min, Vaso @ 0.08 units/min and angiotensin II @ 40 ng/kg/min) MAP 60, -110s, , per MD giving another 500 mL NS bolus with improvement in MAP mid 60s, , sats 100%.

## 2019-08-31 NOTE — PROGRESS NOTES
Dr. Alvarez notified being unable to still draw back blood from trialysis line, unable to restart pt, did not start Heparin gtt, also pt on 2.8 mcg/kg/min of Levo, 0.08 Vaso and 40 ng/kg/min of angiotensin II, BP 110s/40s, per  mL NS bolus given and pt responded well, BP 170s/50 (MAP 80), bicarb gtt also ordered while not on CRRT. Decision to hold off on turning pt to supine at this time to exhange trialysis line again as lactic acid has improved from 19 to 8.6 following 3 units PRBC. MD aware of 0400 ABG and am lab results including increase in liver enzymes, will contiue to trend.

## 2019-08-31 NOTE — PROGRESS NOTES
Dr. Medrano and Dr. Alvarez notified of pt clotting off on CRRT 30 mins after being restarted using the new trialysis placed by MD, unable to now aspirate from either port, Per MD order place pt on Heparin gtt pre-filter. Spoke with Dr. Medrano and order place for Heparin gtt @ 300 units/hr pre-filter and an aPTT in the am. Dialysis nurse notified.

## 2019-08-31 NOTE — ASSESSMENT & PLAN NOTE
Reintubated 8/30 evening.  Chemically paralyzed.  Continue supportive care and lung protective ventilation.

## 2019-08-31 NOTE — ASSESSMENT & PLAN NOTE
Blood cultures from 8/26 with MRSA. Right chest port removed. Repeat blood cultures NGTD. ID following, appreciate recs. Continue vancomycin, Zerbaxa, and Tobramycin.

## 2019-08-31 NOTE — PROGRESS NOTES
Pharmacokinetic Initial Assessment: Tobramycin    Assessment:  Weight utilized for dose calculation: Ideal Body Weight  Dosing method utilized: Dosing by random level    Plan: Tobramycin 90 mg IV once, followed by a serum trough level to be drawn on 8/31 at 1545.. Goal Peak level is less than 1 mg/L. Trough Monitoring to follow.    Pharmacy will continue to monitor.    Please contact pharmacy at extension [phone] with any questions regarding this assessment.    Thank you for the consult,    Pradip Martinez       Patient brief summary:  Juanita Ibarra is a 29 y.o. female initiated on aminoglycoside therapy for treatment of suspected pneumonia    Of note: for dosing recommendations in consults related to pulmonary exacerbations due to Cystic Fibrosis, please contact Infectious Disease (ID) or contact ID pharmacy.    Drug Allergies:   Review of patient's allergies indicates:   Allergen Reactions    Albuterol Palpitations    Colistin Anaphylaxis    Vancomycin analogues      Infusion reaction that does not resolve with slowing  Pt. States she can tolerate it when given with 50 mg Benadryl and ran over 3 hours    Neupogen [filgrastim] Other (See Comments)     Ostealgia after five daily doses of 300 mcg.      Bactrim [sulfamethoxazole-trimethoprim] Hives    Ceftazidime Hives     Pt stated can tolerate cefapine not ceftazidime    Ceftazidime     Dronabinol Other (See Comments)     Mental changes/hallucinations    Haldol [haloperidol lactate] Other (See Comments)     Seizure like activity    Nsaids (non-steroidal anti-inflammatory drug)      Cannot have due to lung transplant    Adhesive Rash     Cloth tape- please use tegaderm or paper tape    Aztreonam Rash    Ciprofloxacin Nausea And Vomiting     Projectile N/V, per patient.  Unwilling to retry therapy.       Actual Body Weight:   50.2kg    Adjust Body Weight:   47.4kg    Ideal Body Weight:  45.5kg    Renal Function:   Estimated Creatinine Clearance:  65.7 mL/min (based on SCr of 0.9 mg/dL).,     Dialysis Method (if applicable):  SLED    CBC (last 72 hours):  Recent Labs   Lab Result Units 08/28/19  0415 08/29/19  0300 08/30/19  0301 08/30/19  1745 08/30/19  2200 08/31/19  0142   WBC K/uL 4.67 5.71 12.71* 10.66 9.24 10.07   Hemoglobin g/dL 8.1* 8.0* 8.3* 7.9* 6.4* 7.4*   Hematocrit % 26.2* 25.7* 27.2* 26.0* 21.8* 24.6*   Platelets K/uL 117* 124* 153 148* 140* 94*   Gran% % 87.1* 89.2* 86.0* 86.2* 63.0 61.0   Lymph% % 6.9* 5.6* 7.4* 7.9* 12.0* 13.0*   Mono% % 4.9 3.9* 5.1 4.1 2.0* 3.0*   Eosinophil% % 0.0 0.0 0.0 0.0 0.0 0.0   Basophil% % 0.0 0.2 0.4 0.6 1.0 0.0   Differential Method  Automated Automated Automated Automated Manual Manual       Metabolic Panel (last 72 hours):  Recent Labs   Lab Result Units 08/28/19  0415 08/28/19  1425 08/28/19  2200 08/29/19  0300 08/29/19  1430 08/29/19  2206 08/30/19  0301 08/30/19  1359 08/30/19  2200 08/31/19  0142   Sodium mmol/L 139 138 135* 136 136 138 138 138 133*  --    Potassium mmol/L 4.9 4.9 3.9 4.4 4.3 4.3 4.3 4.1 4.4  --    Chloride mmol/L 104 108 107 108 103 102 103 102 101  --    CO2 mmol/L 21* 17* 11* 11* 17* 18* 21* 22* 13*  --    Glucose mg/dL 152* 201* 233* 161* 149* 123* 119* 115* 250*  --    BUN, Bld mg/dL 4* 4* 16 19 5* 2* 3* 5* 8  --    Creatinine mg/dL 0.6 0.5 1.0 1.2 0.6 0.5 0.5 0.6 0.9  --    Albumin g/dL 2.5*  2.5* 2.3* 2.4* 2.5*  2.5* 2.4* 2.6* 2.7*  2.7* 2.5* 1.8*  1.8* 1.5*   Total Bilirubin mg/dL 0.4  --   --  0.3  --   --  0.4  --  0.4 0.4   Alkaline Phosphatase U/L 173*  --   --  174*  --   --  195*  --  173* 147*   AST U/L 15  --   --  16  --   --  17  --  21 83*   ALT U/L 10  --   --  11  --   --  10  --  8* 20   Magnesium mg/dL 2.1 2.0 2.1 2.0 2.0 1.9 1.9 1.9 3.0*  --    Phosphorus mg/dL 3.8 1.9* 4.2 4.7* 1.3* 3.5 1.7* 4.0 5.8*  --        Microbiologic Results:  Microbiology Results (last 7 days)     Procedure Component Value Units Date/Time    Blood culture [[id number]] Collected:   08/30/19 1445    Order Status:  Completed Specimen:  Blood from Peripheral, Forearm, Right Updated:  08/31/19 0115     Blood Culture, Routine No Growth to date    Blood culture [[id number]] Collected:  08/30/19 1450    Order Status:  Completed Specimen:  Blood from Peripheral, Hand, Left Updated:  08/31/19 0115     Blood Culture, Routine No Growth to date    Blood culture [[id number]] Collected:  08/28/19 1250    Order Status:  Completed Specimen:  Blood from Peripheral, Hand, Right Updated:  08/30/19 1612     Blood Culture, Routine No Growth to date      No Growth to date      No Growth to date    Blood culture [[id number]]  (Abnormal) Collected:  08/28/19 1020    Order Status:  Completed Specimen:  Blood from Peripheral, Wrist, Right Updated:  08/30/19 0959     Blood Culture, Routine Gram stain aer bottle: Gram positive cocci in clusters resembling Staph      Results called to and read back by:Lashanda Rios RN 08/29/2019  17:38      STAPHYLOCOCCUS AUREUS  Susceptibility pending  ID consult required at [facility].      Blood culture #1 **CANNOT BE ORDERED STAT** [[id number]]  (Abnormal) Collected:  08/26/19 0848    Order Status:  Completed Specimen:  Blood from Line, Port A Cath Updated:  08/29/19 1111     Blood Culture, Routine Gram stain lisa bottle: Gram positive cocci in clusters resembling Staph       Results called to and read back by:Monet Martinez RN 08/27/2019  06:04      Gram stain aer bottle: Gram positive cocci in clusters resembling Staph       08/28/2019  10:29      METHICILLIN RESISTANT STAPHYLOCOCCUS AUREUS  ID consult required at [place] [facility].  For susceptibility see order #[id number]      Blood culture #2 **CANNOT BE ORDERED STAT** [[id number]]  (Abnormal)  (Susceptibility) Collected:  08/26/19 0859    Order Status:  Completed Specimen:  Blood from Wrist, Right Updated:  08/29/19 1110     Blood Culture, Routine Gram  stain aer bottle: Gram positive cocci in clusters resembling Staph       Results called to and read back by:Monet Martinez RN 08/27/2019 03:23      Gram stain lisa bottle: Gram positive cocci in clusters resembling Staph       08/27/2019  11:38      METHICILLIN RESISTANT STAPHYLOCOCCUS AUREUS  ID consult required at [facility],[place],[facility] and [facility]   locations.      Culture, Respiratory [[id number]]  (Abnormal)  (Susceptibility) Collected:  08/26/19 1441    Order Status:  Completed Specimen:  Respiratory from Bronchial Wash Updated:  08/28/19 1213     Respiratory Culture METHICILLIN RESISTANT STAPHYLOCOCCUS AUREUS  Many       Gram Stain (Respiratory) <10 epithelial cells per low power field.     Gram Stain (Respiratory) Few WBC's     Gram Stain (Respiratory) Many Gram positive cocci    Narrative:       Bronchial Wash    Fungus culture [[id number]]  (Abnormal) Collected:  08/26/19 1441    Order Status:  Completed Specimen:  Respiratory from Bronchial Wash Updated:  08/28/19 1101     Fungus (Mycology) Culture YEAST   Many  Identification pending      Narrative:       Bronchial Wash    AFB Culture & Smear [[id number]] Collected:  08/26/19 1441    Order Status:  Completed Specimen:  Respiratory from Bronchial Wash Updated:  08/27/19 2127     AFB Culture & Smear Culture in progress     AFB CULTURE STAIN No acid fast bacilli seen.    Narrative:       Bronchial Wash    Respiratory Infection Panel, Nasopharyngeal [[id number]] Collected:  08/26/19 1003    Order Status:  Completed Specimen:  Nasopharyngeal Swab Updated:  08/26/19 1344     Respiratory Infection Panel Source NP Swab     Adenovirus Not Detected     Coronavirus 229E Not Detected     Coronavirus HKU1 Not Detected     Coronavirus NL63 Not Detected     Coronavirus OC43 Not Detected     Human Metapneumovirus Not Detected     Human Rhinovirus/Enterovirus Not Detected     Influenza A (subtypes H1, H1-2009,H3) Not Detected     Influenza B Not Detected      Parainfluenza Virus 1 Not Detected     Parainfluenza Virus 2 Not Detected     Parainfluenza Virus 3 Not Detected     Parainfluenza Virus 4 Not Detected     Respiratory Syncytial Virus Not Detected     Bordetella Parapertussis (XA5219) Not Detected     Bordetella pertussis (ptxP) Not Detected     Chlamydia pneumoniae Not Detected     Mycoplasma pneumoniae Not Detected    Narrative:       For all other respiratory sources order [id number] Respiratory  Viral Panel by PCR (RVPCR)    Culture, Respiratory  - Cystic Fibrosis [[id number]]     Order Status:  Canceled Specimen:  Respiratory

## 2019-08-31 NOTE — SIGNIFICANT EVENT
1630- Pt with new onset decrease in Sp02. Was maintaining >95% on 10L HFNC and now maintaining 90-91% Sp02 on 10L HFNC. 02 increased to 15L. Small increase in WOB noted. Precedex to be restarted. Reyes Will, KUMAR called and notified. NP gave order for STAT CXR and to come to bedside and assess.     1730- Dr. Alvarez at bedside. MD aware of latest ABG, pt's WOB, increased 02 requirements and acute increase in pressor requirements. Order for STAT CBC and ABG with lactate and lytes. Will carry out.     1800- Decision made to intubate and paralyze pt. Verbal order given for induction meds. Orders read back and verified. RN to remain at bedside.

## 2019-08-31 NOTE — PLAN OF CARE
Problem: Adult Inpatient Plan of Care  Goal: Plan of Care Review  Outcome: Ongoing (interventions implemented as appropriate)  Pt remains intubated on AC VC 90%, 15 PEEP, sedated, and medically paralyzed using Nimbex, versed, and fentanyl. Maintaining BIS 40-60 and TOF of 1/4. Pt supined this AM tolerating well. SLED initiated this afternoon UF goal 200-250. Extremities mottled, only dopplerable DP's, Dr. Alvarez aware. Tmax 104, placed on cooling blanket, current temp 99.0. POC reviewed family. Will continue to monitor.

## 2019-08-31 NOTE — CARE UPDATE
Pt. In prone position. Moved head from tight to left side. ETT tube secured during process. Will continue to monitor.

## 2019-08-31 NOTE — SUBJECTIVE & OBJECTIVE
Interval History: Patient evaluated bedside, intubated and proned. Hb dropped yesterday to 6s, has received 3 u PRBCs since with improvement to 12s. Patient's SaO2 dropped to 70s ~2 AM and coded, received 1 round of CPR. Had been receiving SLED 8/29-8/30 until about 4 pm. Attempted restart SLED prior to code, and after code, both unsuccessful due to clotting of femoral catheter. This AM, patient on vaso and levo and HR in 170s. Net positive 7 L past 24 hours. ABG shows pH 7.29, CO2 56, HCO3 27.     Review of patient's allergies indicates:   Allergen Reactions    Albuterol Palpitations    Colistin Anaphylaxis    Vancomycin analogues      Infusion reaction that does not resolve with slowing  Pt. States she can tolerate it when given with 50 mg Benadryl and ran over 3 hours    Neupogen [filgrastim] Other (See Comments)     Ostealgia after five daily doses of 300 mcg.      Bactrim [sulfamethoxazole-trimethoprim] Hives    Ceftazidime Hives     Pt stated can tolerate cefapine not ceftazidime    Ceftazidime     Dronabinol Other (See Comments)     Mental changes/hallucinations    Haldol [haloperidol lactate] Other (See Comments)     Seizure like activity    Nsaids (non-steroidal anti-inflammatory drug)      Cannot have due to lung transplant    Adhesive Rash     Cloth tape- please use tegaderm or paper tape    Aztreonam Rash    Ciprofloxacin Nausea And Vomiting     Projectile N/V, per patient.  Unwilling to retry therapy.     Current Facility-Administered Medications   Medication Frequency    0.9%  NaCl infusion (CRRT USE ONLY) Continuous    0.9%  NaCl infusion (for blood administration) Q24H PRN    0.9%  NaCl infusion (for blood administration) Q24H PRN    angiotensin II (GIAPREZA) 2,500,000 ng in sodium chloride 0.9% 250 mL infusion Continuous    calcium gluconate 1g in dextrose 5% 100mL (ready to mix system) Q1H    ceftolozane-tazobactam (ZERBAXA) 1,500 mg in dextrose 5 % 100 mL Q8H    chlorhexidine  0.12 % solution 15 mL BID    cisatracurium (NIMBEX) 200 mg in dextrose 5 % 100 mL infusion Continuous    dexmedetomidine (PRECEDEX) 400mcg/100mL 0.9% NaCL infusion Continuous    dextrose 50% injection 12.5 g PRN    diphenhydrAMINE 12.5 mg/5 mL elixir 25 mg Daily PRN    diphenhydrAMINE injection 50 mg Q6H PRN    fentaNYL 2500 mcg in 0.9% sodium chloride 250 mL infusion premix (titrating) Continuous    glucagon (human recombinant) injection 1 mg PRN    heparin (porcine) injection 5,000 Units Q12H    heparin 25,000 units in dextrose 5% 250 mL (100 units/mL) infusion (heparin infusion) Continuous    HYDROmorphone injection 1 mg Q6H PRN    insulin aspart U-100 pen 0-5 Units Q6H PRN    levalbuterol nebulizer solution 1.25 mg Q8H    lipase-protease-amylase 10,440-39,150- 39,150 unit Tab 1 tablet Q3H    LORazepam tablet 1 mg Q8H PRN    magnesium sulfate 2g in water 50mL IVPB (premix) PRN    metoprolol injection 10 mg Q5 Min PRN    micafungin 100 mg in sodium chloride 0.9 % 100 mL IVPB (ready to mix system) Q24H    midazolam (VERSED) 1 mg/mL in dextrose 5 % 100 mL infusion (non-titrating) Continuous    norepinephrine 16 mg in dextrose 5 % 250 mL infusion Continuous    ondansetron injection 8 mg Q6H PRN    oxyCODONE immediate release tablet Tab 10 mg Q8H PRN    predniSONE tablet 15 mg Daily    promethazine (PHENERGAN) 12.5 mg in dextrose 5 % 50 mL IVPB Q6H PRN    QUEtiapine tablet 25 mg Daily    QUEtiapine tablet 50 mg QHS    sodium bicarbonate 1 mEq/mL (8.4 %) 150 mEq in dextrose 5 % 1,000 mL infusion Continuous    sodium chloride 0.9% bolus 1,000 mL Once    sodium chloride 0.9% bolus 500 mL Once    sodium phosphate 20.01 mmol in dextrose 5 % 250 mL IVPB PRN    sodium phosphate 30 mmol in dextrose 5 % 250 mL IVPB PRN    sodium phosphate 39.99 mmol in dextrose 5 % 250 mL IVPB PRN    topiramate tablet 25 mg BID    vasopressin (PITRESSIN) 0.2 Units/mL in dextrose 5 % 100 mL infusion Continuous     voriconazole tablet 200 mg BID       Objective:     Vital Signs (Most Recent):  Temp: (!) 102 °F (38.9 °C) (08/31/19 0700)  Pulse: (!) 171 (08/31/19 0701)  Resp: (!) 35 (08/31/19 0314)  BP: (!) 90/55 (08/31/19 0615)  SpO2: (!) 93 % (08/31/19 0701)  O2 Device (Oxygen Therapy): ventilator (08/31/19 0701) Vital Signs (24h Range):  Temp:  [97.8 °F (36.6 °C)-102 °F (38.9 °C)] 102 °F (38.9 °C)  Pulse:  [116-173] 171  Resp:  [24-36] 35  SpO2:  [61 %-100 %] 93 %  BP: ()/(41-85) 90/55  Arterial Line BP: ()/(30-70) 115/41     Weight: 50.2 kg (110 lb 10.7 oz) (08/30/19 0500)  Body mass index is 21.61 kg/m².  Body surface area is 1.46 meters squared.    I/O last 3 completed shifts:  In: 77163.3 [I.V.:02625; Blood:1983.3; Other:75; NG/GT:500]  Out: 6699 [Drains:125; Other:6574]    Physical Exam   Constitutional: She appears well-developed. She is sedated and intubated.   on mechanical ventilation   HENT:   Head: Normocephalic and atraumatic.   Eyes: Right eye exhibits no discharge. Left eye exhibits no discharge. No scleral icterus.   Neck: Neck supple.   Cardiovascular: Normal rate and regular rhythm.   Pulmonary/Chest: She is intubated. No respiratory distress. She has no wheezes. She has rales.   Transmitted ventilator sounds   Abdominal: Soft. Bowel sounds are normal.   Musculoskeletal: She exhibits edema. She exhibits no tenderness or deformity.   Neurological: She is alert.   Skin: Skin is warm. There is pallor.   Nursing note and vitals reviewed.      Significant Labs:  CBC:   Recent Labs   Lab 08/31/19  0400   WBC 7.36  7.36   RBC 4.30  4.30   HGB 12.2  12.2   HCT 37.1  37.1   PLT 76*  76*   MCV 86  86   MCH 28.4  28.4   MCHC 32.9  32.9     CMP:   Recent Labs   Lab 08/31/19  0400   *   CALCIUM 6.0*   ALBUMIN 1.5*  1.5*  1.5*   PROT 4.1*  4.1*      K 3.7   CO2 25   CL 97   BUN 11   CREATININE 1.1   ALKPHOS 165*  165*   *  363*   AST 1,586*  1,586*   BILITOT 0.6  0.6      All labs within the past 24 hours have been reviewed.     Significant Imaging:  CXR personally reviewed.

## 2019-08-31 NOTE — PROGRESS NOTES
[facility]  Nephrology  Progress Note    Patient Name: Juanita Ibarra  MRN: [id number]  Admission Date: 8/26/2019  Hospital Length of Stay: 5 days  Attending Provider: Jake Alvarez MD   Primary Care Physician: Primary Doctor No  Principal Problem:Septic shock    Subjective:     HPI: A 28 y/o female s/p bilateral re-transplant (6/18/19) secondary to CF, who presented to the ED in acute respiratory distress. Patient's family member reports that she had and episode of fever 2 days ago accompanied by productive cough requiring increasing at home oxygen to ~5L. Also, caregiver reports poor appetite and PO intake over the last week. Upon arrival to ED, oxygen saturations were in mid 70s while on NRB and patient was placed on BiPAP which was transitioned to 25L comfort flow at 100% FiO2. On arrival to ED and worsening clinical status patient was transferred to the ICU, intubated, and started on Zerbaxa/Vancomycin.  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    Nephrology consulted for evaluation of LISBETH and Metabolic acidosis.    Interval History: Patient evaluated bedside, intubated and proned. Hb dropped yesterday to 6s, has received 3 u PRBCs since with improvement to 12s. Patient's SaO2 dropped to 70s ~2 AM and coded, received 1 round of CPR. Had been receiving SLED 8/29-8/30 until about 4 pm. Attempted restart SLED prior to code, and after code, both unsuccessful due to clotting of femoral catheter. This AM, patient on vaso and levo and HR in 170s. Net positive 7 L past 24 hours. ABG shows pH 7.29, CO2 56, HCO3 27.     Review of patient's allergies indicates:   Allergen Reactions    Albuterol Palpitations    Colistin Anaphylaxis    Vancomycin analogues      Infusion reaction that does not resolve with slowing  Pt. States she can  tolerate it when given with 50 mg Benadryl and ran over 3 hours    Neupogen [filgrastim] Other (See Comments)     Ostealgia after five daily doses of 300 mcg.      Bactrim [sulfamethoxazole-trimethoprim] Hives    Ceftazidime Hives     Pt stated can tolerate cefapine not ceftazidime    Ceftazidime     Dronabinol Other (See Comments)     Mental changes/hallucinations    Haldol [haloperidol lactate] Other (See Comments)     Seizure like activity    Nsaids (non-steroidal anti-inflammatory drug)      Cannot have due to lung transplant    Adhesive Rash     Cloth tape- please use tegaderm or paper tape    Aztreonam Rash    Ciprofloxacin Nausea And Vomiting     Projectile N/V, per patient.  Unwilling to retry therapy.     Current Facility-Administered Medications   Medication Frequency    0.9%  NaCl infusion (CRRT USE ONLY) Continuous    0.9%  NaCl infusion (for blood administration) Q24H PRN    0.9%  NaCl infusion (for blood administration) Q24H PRN    angiotensin II (GIAPREZA) 2,500,000 ng in sodium chloride 0.9% 250 mL infusion Continuous    calcium gluconate 1g in dextrose 5% 100mL (ready to mix system) Q1H    ceftolozane-tazobactam (ZERBAXA) 1,500 mg in dextrose 5 % 100 mL Q8H    chlorhexidine 0.12 % solution 15 mL BID    cisatracurium (NIMBEX) 200 mg in dextrose 5 % 100 mL infusion Continuous    dexmedetomidine (PRECEDEX) 400mcg/100mL 0.9% NaCL infusion Continuous    dextrose 50% injection 12.5 g PRN    diphenhydrAMINE 12.5 mg/5 mL elixir 25 mg Daily PRN    diphenhydrAMINE injection 50 mg Q6H PRN    fentaNYL 2500 mcg in 0.9% sodium chloride 250 mL infusion premix (titrating) Continuous    glucagon (human recombinant) injection 1 mg PRN    heparin (porcine) injection 5,000 Units Q12H    heparin 25,000 units in dextrose 5% 250 mL (100 units/mL) infusion (heparin infusion) Continuous    HYDROmorphone injection 1 mg Q6H PRN    insulin aspart U-100 pen 0-5 Units Q6H PRN    levalbuterol  nebulizer solution 1.25 mg Q8H    lipase-protease-amylase 10,440-39,150- 39,150 unit Tab 1 tablet Q3H    LORazepam tablet 1 mg Q8H PRN    magnesium sulfate 2g in water 50mL IVPB (premix) PRN    metoprolol injection 10 mg Q5 Min PRN    micafungin 100 mg in sodium chloride 0.9 % 100 mL IVPB (ready to mix system) Q24H    midazolam (VERSED) 1 mg/mL in dextrose 5 % 100 mL infusion (non-titrating) Continuous    norepinephrine 16 mg in dextrose 5 % 250 mL infusion Continuous    ondansetron injection 8 mg Q6H PRN    oxyCODONE immediate release tablet Tab 10 mg Q8H PRN    predniSONE tablet 15 mg Daily    promethazine (PHENERGAN) 12.5 mg in dextrose 5 % 50 mL IVPB Q6H PRN    QUEtiapine tablet 25 mg Daily    QUEtiapine tablet 50 mg QHS    sodium bicarbonate 1 mEq/mL (8.4 %) 150 mEq in dextrose 5 % 1,000 mL infusion Continuous    sodium chloride 0.9% bolus 1,000 mL Once    sodium chloride 0.9% bolus 500 mL Once    sodium phosphate 20.01 mmol in dextrose 5 % 250 mL IVPB PRN    sodium phosphate 30 mmol in dextrose 5 % 250 mL IVPB PRN    sodium phosphate 39.99 mmol in dextrose 5 % 250 mL IVPB PRN    topiramate tablet 25 mg BID    vasopressin (PITRESSIN) 0.2 Units/mL in dextrose 5 % 100 mL infusion Continuous    voriconazole tablet 200 mg BID       Objective:     Vital Signs (Most Recent):  Temp: (!) 102 °F (38.9 °C) (08/31/19 0700)  Pulse: (!) 171 (08/31/19 0701)  Resp: (!) 35 (08/31/19 0314)  BP: (!) 90/55 (08/31/19 0615)  SpO2: (!) 93 % (08/31/19 0701)  O2 Device (Oxygen Therapy): ventilator (08/31/19 0701) Vital Signs (24h Range):  Temp:  [97.8 °F (36.6 °C)-102 °F (38.9 °C)] 102 °F (38.9 °C)  Pulse:  [116-173] 171  Resp:  [24-36] 35  SpO2:  [61 %-100 %] 93 %  BP: ()/(41-85) 90/55  Arterial Line BP: ()/(30-70) 115/41     Weight: 50.2 kg (110 lb 10.7 oz) (08/30/19 0500)  Body mass index is 21.61 kg/m².  Body surface area is 1.46 meters squared.    I/O last 3 completed shifts:  In: 91291.3  [I.V.:18392; Blood:1983.3; Other:75; NG/GT:500]  Out: 6699 [Drains:125; Other:6574]    Physical Exam   Constitutional: She appears well-developed. She is sedated and intubated.   on mechanical ventilation   HENT:   Head: Normocephalic and atraumatic.   Eyes: Right eye exhibits no discharge. Left eye exhibits no discharge. No scleral icterus.   Neck: Neck supple.   Cardiovascular: Normal rate and regular rhythm.   Pulmonary/Chest: She is intubated. No respiratory distress. She has no wheezes. She has rales.   Transmitted ventilator sounds   Abdominal: Soft. Bowel sounds are normal.   Musculoskeletal: She exhibits edema. She exhibits no tenderness or deformity.   Neurological: She is alert.   Skin: Skin is warm. There is pallor.   Nursing note and vitals reviewed.      Significant Labs:  CBC:   Recent Labs   Lab 08/31/19  0400   WBC 7.36  7.36   RBC 4.30  4.30   HGB 12.2  12.2   HCT 37.1  37.1   PLT 76*  76*   MCV 86  86   MCH 28.4  28.4   MCHC 32.9  32.9     CMP:   Recent Labs   Lab 08/31/19  0400   *   CALCIUM 6.0*   ALBUMIN 1.5*  1.5*  1.5*   PROT 4.1*  4.1*      K 3.7   CO2 25   CL 97   BUN 11   CREATININE 1.1   ALKPHOS 165*  165*   *  363*   AST 1,586*  1,586*   BILITOT 0.6  0.6     All labs within the past 24 hours have been reviewed.     Significant Imaging:  CXR personally reviewed.    Assessment/Plan:     * Septic shock  - Being managed by primary team     S/P lung transplant  Managed by primary team    LISBETH (acute kidney injury)  Ms. 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 Her baseline SCr in the past year has been around ~ 1.0. LISBETH likely secondary to ATN caused by hypotension/sepsis in the setting of severe metabolic acidosis.    Recommendations:    - Strict I/O and chart  - Avoid nephrotoxic medications, NSAIDs, IV contrast, etc.  - Medication doses adjusted to GFR  -  Maintain MAP > 65  - Patient's SaO2 dropped to 70s ~2 AM and coded, received 1 round of CPR.   - Had been receiving SLED 8/29-8/30 until about 4 pm on 8/30. Attempted restart SLED 8/31 prior to code, and after code, but both attempts unsuccessful due to clotting of femoral catheter.   -This AM, patient on vaso and levo and HR in 170s. ABG shows pH 7.29, CO2 56, HCO3 27--> resp acidosis. Net positive 7 L past 24 hours.  - Will hold RRT for now in setting of hemodynamic instability. Would consider placement of trialysis catheter in IJ for further RRT if patient becomes HD stable again          Thank you for your consult. I will follow-up with patient. Please contact us if you have any additional questions.    Radha Calvin MD  Nephrology  [facility]    ATTENDING PHYSICIAN ATTESTATION  I have personally assessed and examined the patient. I thoroughly reviewed the demographic, clinical, laboratorial and imaging information available in medical records. I agree with the assessment and recommendations provided by the subspecialty resident. Dr. Calvin was under my supervision. Suggesting switching norepinephrine to phenylephrine to minimize tachycardia risk. New HD catheter to be inserted, requested 25 cm femoral line. Will reassess hemodynamic status later today to consider re-initiation of SLED. Discussed with Dr. Alvarez.

## 2019-08-31 NOTE — PT/OT/SLP PROGRESS
Speech Language Pathology      Juanita Ibarra  MRN: [id number]    SLP Swallow Evaluation orders received. Patient re-intubated prior to assessment. ST To discharge orders.   Please re-consult when appropriate.      NISHA Palma., [facility]-SLP  Speech-Language Pathology  8/31/2019

## 2019-08-31 NOTE — PROGRESS NOTES
[facility]  Lung Transplant  Progress Note - Critical Care    Patient Name: Juanita Ibarra  MRN: [id number]  Admission Date: 8/26/2019  Hospital Length of Stay: 5 days  Post-Operative Day: 1906 (Lung), 74 (Lung)  Attending Physician: Jake Alvarez MD  Primary Care Provider: Primary Doctor No     Subjective:     Interval History: Events from last night noted.  S/P PEA arrest.  Continue supportive care    Continuous Infusions:   sodium chloride 0.9% 200 mL/hr at 08/30/19 2106    angiotensin II 10 ng/kg/min (08/30/19 2343)    cisatracurium (NIMBEX) infusion 2 mcg/kg/min (08/31/19 1200)    dexmedetomidine (PRECEDEX) infusion Stopped (08/30/19 1800)    fentanyl 150 mcg/hr (08/31/19 1200)    heparin (porcine) in 5 % dex      midazolam (VERSED) infusion (non-titrating) 3 mg/hr (08/31/19 1200)    norepinephrine bitartrate-D5W 2.4 mcg/kg/min (08/31/19 1200)    phenylephrine 0.5 mcg/kg/min (08/31/19 1200)    sodium bicarbonate drip 125 mL/hr at 08/31/19 1200    vasopressin (PITRESSIN) infusion 0.08 Units/min (08/31/19 1200)     Scheduled Meds:   ceftolozane-tazobactam  1,500 mg Intravenous Q8H    chlorhexidine  15 mL Mouth/Throat BID    heparin (porcine)  5,000 Units Subcutaneous Q12H    levalbuterol  1.25 mg Nebulization Q8H    lipase-protease-amylase  1 tablet Per OG tube Q3H    micafungin (MYCAMINE) IVPB  100 mg Intravenous Q24H    predniSONE  15 mg Per OG tube Daily    QUEtiapine  25 mg Per OG tube Daily    QUEtiapine  50 mg Per OG tube QHS    sodium chloride 0.9%  1,000 mL Intravenous Once    sodium chloride 0.9%  500 mL Intravenous Once    topiramate  25 mg Per OG tube BID    voriconazole  200 mg Per OG tube BID     PRN Meds:sodium chloride, sodium chloride, dextrose 50%, diphenhydrAMINE, diphenhydrAMINE, glucagon (human recombinant), HYDROmorphone, insulin aspart U-100, LORazepam, magnesium sulfate IVPB, metoprolol, ondansetron, oxyCODONE, promethazine (PHENERGAN)  IVPB, sodium phosphate IVPB, sodium phosphate IVPB, sodium phosphate IVPB    Review of patient's allergies indicates:   Allergen Reactions    Albuterol Palpitations    Colistin Anaphylaxis    Vancomycin analogues      Infusion reaction that does not resolve with slowing  Pt. States she can tolerate it when given with 50 mg Benadryl and ran over 3 hours    Neupogen [filgrastim] Other (See Comments)     Ostealgia after five daily doses of 300 mcg.      Bactrim [sulfamethoxazole-trimethoprim] Hives    Ceftazidime Hives     Pt stated can tolerate cefapine not ceftazidime    Ceftazidime     Dronabinol Other (See Comments)     Mental changes/hallucinations    Haldol [haloperidol lactate] Other (See Comments)     Seizure like activity    Nsaids (non-steroidal anti-inflammatory drug)      Cannot have due to lung transplant    Adhesive Rash     Cloth tape- please use tegaderm or paper tape    Aztreonam Rash    Ciprofloxacin Nausea And Vomiting     Projectile N/V, per patient.  Unwilling to retry therapy.       Review of Systems   Unable to perform ROS: Intubated     Objective:   Physical Exam   Constitutional: She appears well-developed and well-nourished. She appears ill. She is sedated, chemically paralyzed and intubated.   HENT:   Head: Normocephalic and atraumatic.   ET and OG tubes in place   Eyes: Right eye exhibits no discharge. Left eye exhibits no discharge. No scleral icterus.   Neck: Neck supple.   Cardiovascular: Normal rate and regular rhythm.   Pulmonary/Chest: She is intubated. No respiratory distress. She has no wheezes. She has rales.   Mechanical ventilator sounds   Abdominal: Soft. Bowel sounds are normal.   Musculoskeletal: She exhibits edema. She exhibits no tenderness or deformity.   Skin: Skin is warm. There is pallor.   Right leg with mottling noted.   Nursing note and vitals reviewed.        Vital Signs (Most Recent):  Temp: (!) 103.5 °F (39.7 °C) (08/31/19 1200)  Pulse: (!) 162  (08/31/19 1205)  Resp: (!) 35 (08/31/19 0836)  BP: 117/67 (08/31/19 0930)  SpO2: (!) 71 % (08/31/19 1115) Vital Signs (24h Range):  Temp:  [98.4 °F (36.9 °C)-103.5 °F (39.7 °C)] 103.5 °F (39.7 °C)  Pulse:  [116-173] 162  Resp:  [24-35] 35  SpO2:  [61 %-100 %] 71 %  BP: ()/(41-85) 117/67  Arterial Line BP: ()/(30-68) 109/48     Weight: 50.2 kg (110 lb 10.7 oz)  Body mass index is 21.61 kg/m².      Intake/Output Summary (Last 24 hours) at 8/31/2019 1300  Last data filed at 8/31/2019 1200  Gross per 24 hour   Intake 8404.33 ml   Output 1217 ml   Net 7187.33 ml       Ventilator Data:     Vent Mode: A/C  Oxygen Concentration (%):  [] 98  Resp Rate Total:  [0 br/min-44 br/min] 35 br/min  Vt Set:  [280 mL-300 mL] 280 mL  PEEP/CPAP:  [10 cmH20-15 cmH20] 15 cmH20  Mean Airway Pressure:  [19 zcA36-26 cmH20] 24 cmH20    Hemodynamic Parameters:       Lines/Drains:       Percutaneous Central Line Insertion/Assessment - triple lumen  08/26/19 1135 left internal jugular (Active)   Dressing biopatch in place;dressing dry and intact 8/31/2019 11:15 AM   Securement secured w/ sutures 8/31/2019 11:15 AM   Additional Site Signs no erythema;no warmth;no edema;no pain;no palpable cord;no streak formation;no drainage 8/31/2019 11:15 AM   Distal Patency/Care infusing 8/31/2019 11:15 AM   Medial Patency/Care infusing 8/31/2019 11:15 AM   Proximal Patency/Care infusing 8/31/2019 11:15 AM   Waveform normal 8/30/2019  3:15 PM   Line Interventions line leveled/zeroed 8/31/2019 11:15 AM   Dressing Change Due 09/02/19 8/31/2019 11:15 AM   Daily Line Review Performed 8/31/2019 11:15 AM   Number of days: 5            Trialysis (Dialysis) Catheter 08/31/19 0110 right femoral (Active)   IV Device Securement sutures 8/31/2019 11:15 AM   Additional Site Signs no erythema;no warmth;no edema;no pain;no palpable cord;no streak formation;no drainage 8/31/2019 11:15 AM   Patency/Care flushed w/o difficulty;blood return not present  8/31/2019 11:15 AM   Site Assessment Clean;Dry;Intact;No redness;No swelling 8/31/2019 11:15 AM   Status Clamped 8/31/2019 11:15 AM   Dressing Intervention New dressing 8/31/2019  3:00 AM   Dressing Status Biopatch in place;Clean;Dry;Intact 8/31/2019 11:15 AM   Dressing Change Due 09/07/19 8/31/2019  3:00 AM   Site Condition No complications 8/31/2019  3:00 AM   Dressing Occlusive 8/31/2019 11:15 AM   Daily Line Review Performed 8/31/2019 11:15 AM   Number of days: 0            Peripheral IV - Single Lumen 08/26/19 1119 20 G Anterior;Right Forearm (Active)   Site Assessment Clean;Dry;Intact;No redness;No swelling 8/31/2019 11:15 AM   Line Status Infusing 8/31/2019 11:15 AM   Dressing Status Clean;Dry;Intact 8/31/2019 11:15 AM   Dressing Intervention Dressing reinforced 8/28/2019  3:00 PM   Dressing Change Due 08/30/19 8/31/2019  3:00 AM   Site Change Due 08/30/19 8/31/2019  7:15 AM   Reason Not Rotated Poor venous access 8/31/2019 11:15 AM   Number of days: 5            Peripheral IV - Single Lumen 08/30/19 1600 20 G Right Forearm (Active)   Site Assessment Clean;Dry;Intact;No redness;No swelling 8/31/2019 11:15 AM   Line Status Infusing 8/31/2019 11:15 AM   Dressing Status Clean;Dry;Intact 8/31/2019 11:15 AM   Dressing Change Due 09/03/19 8/31/2019 11:15 AM   Site Change Due 09/03/19 8/31/2019  7:15 AM   Reason Not Rotated Not due 8/31/2019 11:15 AM   Number of days: 0            Arterial Line 08/31/19 0120 Left Femoral (Active)   Site Assessment Clean;Dry;Intact;No redness;No swelling 8/31/2019 11:15 AM   Line Status Pulsatile blood flow 8/31/2019 11:15 AM   Art Line Waveform Appropriate 8/31/2019 11:15 AM   Arterial Line Interventions Zeroed and calibrated;Leveled 8/31/2019 11:15 AM   Color/Movement/Sensation Cool fingers/toes;Dusky fingers/toes 8/31/2019 11:15 AM   Dressing Type Transparent 8/31/2019 11:15 AM   Dressing Status Clean;Dry;Intact 8/31/2019 11:15 AM   Dressing Intervention New dressing 8/31/2019   3:00 AM   Dressing Change Due 09/03/19 8/31/2019  3:00 AM   Number of days: 0            NG/OG Tube 08/30/19 1000 Right nostril (Active)   Placement Check placement verified by x-ray 8/31/2019 11:15 AM   Tolerance no signs/symptoms of discomfort 8/31/2019 11:15 AM   Securement secured to nostril center w/ adhesive device 8/31/2019 11:15 AM   Clamp Status/Tolerance unclamped 8/31/2019 11:15 AM   Suction Setting/Drainage Method suction at;low;intermittent setting 8/31/2019 11:15 AM   Insertion Site Appearance no redness, warmth, tenderness, skin breakdown, drainage 8/31/2019 11:15 AM   Drainage Bile;Green;Brown 8/31/2019 11:15 AM   Flush/Irrigation flushed w/;water;no resistance met 8/31/2019 11:15 AM   Intake (mL) 60 mL 8/31/2019 12:00 PM   Number of days: 1       Significant Labs:  CBC:  Recent Labs   Lab 08/31/19  1151   WBC 17.10*   RBC 4.15   HGB 12.0   HCT 37.2   PLT 69*   MCV 90   MCH 28.9   MCHC 32.3     BMP:  Recent Labs   Lab 08/31/19  0400      K 3.7   CL 97   CO2 25   BUN 11   CREATININE 1.1   CALCIUM 6.0*      Tacrolimus Levels:  Recent Labs   Lab 08/31/19  0400   TACROLIMUS 2.0*     Microbiology:  Microbiology Results (last 7 days)     Procedure Component Value Units Date/Time    Blood culture [[id number]]  (Abnormal)  (Susceptibility) Collected:  08/28/19 1020    Order Status:  Completed Specimen:  Blood from Peripheral, Wrist, Right Updated:  08/31/19 1043     Blood Culture, Routine Gram stain aer bottle: Gram positive cocci in clusters resembling Staph      Results called to and read back by:Lashanda Rios RN 08/29/2019  17:38      METHICILLIN RESISTANT STAPHYLOCOCCUS AUREUS  ID consult required at [facility] and [facility].      Blood culture [[id number]] Collected:  08/30/19 1445    Order Status:  Completed Specimen:  Blood from Peripheral, Forearm, Right Updated:  08/31/19 0115     Blood Culture, Routine No Growth to date    Blood culture [[id number]] Collected:   08/30/19 1450    Order Status:  Completed Specimen:  Blood from Peripheral, Hand, Left Updated:  08/31/19 0115     Blood Culture, Routine No Growth to date    Blood culture [[id number]] Collected:  08/28/19 1250    Order Status:  Completed Specimen:  Blood from Peripheral, Hand, Right Updated:  08/30/19 1612     Blood Culture, Routine No Growth to date      No Growth to date      No Growth to date    Blood culture #1 **CANNOT BE ORDERED STAT** [[id number]]  (Abnormal) Collected:  08/26/19 0848    Order Status:  Completed Specimen:  Blood from Line, Port A Cath Updated:  08/29/19 1111     Blood Culture, Routine Gram stain lisa bottle: Gram positive cocci in clusters resembling Staph       Results called to and read back by:Monet Martinez RN 08/27/2019  06:04      Gram stain aer bottle: Gram positive cocci in clusters resembling Staph       08/28/2019  10:29      METHICILLIN RESISTANT STAPHYLOCOCCUS AUREUS  ID consult required at [facility].  For susceptibility see order #[id number]      Blood culture #2 **CANNOT BE ORDERED STAT** [[id number]]  (Abnormal)  (Susceptibility) Collected:  08/26/19 0859    Order Status:  Completed Specimen:  Blood from Wrist, Right Updated:  08/29/19 1110     Blood Culture, Routine Gram stain aer bottle: Gram positive cocci in clusters resembling Staph       Results called to and read back by:Monet Martinez RN 08/27/2019 03:23      Gram stain lisa bottle: Gram positive cocci in clusters resembling Staph       08/27/2019  11:38      METHICILLIN RESISTANT STAPHYLOCOCCUS AUREUS  ID consult required at [facility] and [facility].      Culture, Respiratory [[id number]]  (Abnormal)  (Susceptibility) Collected:  08/26/19 1441    Order Status:  Completed Specimen:  Respiratory from Bronchial Wash Updated:  08/28/19 1213     Respiratory Culture METHICILLIN RESISTANT STAPHYLOCOCCUS AUREUS  Many       Gram Stain (Respiratory) <10  epithelial cells per low power field.     Gram Stain (Respiratory) Few WBC's     Gram Stain (Respiratory) Many Gram positive cocci    Narrative:       Bronchial Wash    Fungus culture [[id number]]  (Abnormal) Collected:  08/26/19 1441    Order Status:  Completed Specimen:  Respiratory from Bronchial Wash Updated:  08/28/19 1101     Fungus (Mycology) Culture YEAST   Many  Identification pending      Narrative:       Bronchial Wash    AFB Culture & Smear [[id number]] Collected:  08/26/19 1441    Order Status:  Completed Specimen:  Respiratory from Bronchial Wash Updated:  08/27/19 2127     AFB Culture & Smear Culture in progress     AFB CULTURE STAIN No acid fast bacilli seen.    Narrative:       Bronchial Wash    Respiratory Infection Panel, Nasopharyngeal [[id number]] Collected:  08/26/19 1003    Order Status:  Completed Specimen:  Nasopharyngeal Swab Updated:  08/26/19 1344     Respiratory Infection Panel Source NP Swab     Adenovirus Not Detected     Coronavirus 229E Not Detected     Coronavirus HKU1 Not Detected     Coronavirus NL63 Not Detected     Coronavirus OC43 Not Detected     Human Metapneumovirus Not Detected     Human Rhinovirus/Enterovirus Not Detected     Influenza A (subtypes H1, H1-2009,H3) Not Detected     Influenza B Not Detected     Parainfluenza Virus 1 Not Detected     Parainfluenza Virus 2 Not Detected     Parainfluenza Virus 3 Not Detected     Parainfluenza Virus 4 Not Detected     Respiratory Syncytial Virus Not Detected     Bordetella Parapertussis (YM3351) Not Detected     Bordetella pertussis (ptxP) Not Detected     Chlamydia pneumoniae Not Detected     Mycoplasma pneumoniae Not Detected    Narrative:       For all other respiratory sources order [id number] Respiratory  Viral Panel by PCR (RVPCR)    Culture, Respiratory  - Cystic Fibrosis [[id number]]     Order Status:  Canceled Specimen:  Respiratory           I have reviewed all pertinent labs within the past 24 hours.    Diagnostic  Results:  Chest X-Ray: There has not been a significant change in the cardiopulmonary status           Assessment/Plan:     * Septic shock  Multifactorial given staph bacteremia and PNA. History of recurrent  pseudomonal infections and MRSA.    Patient s/p PEA arrest 8/31.  Continue Vancomycin, Tobramycin, and Zerbaxa.  Currently on multiple pressors to maintain MAPs >65. Remains on ventilator, chemically paralyzed.  Will continue supportive care.  Prognosis guarded.     Acute respiratory failure  Reintubated 8/30 evening.  Chemically paralyzed.  Continue supportive care and lung protective ventilation.      S/P lung transplant  Underwent bilateral re-transplant for CARLOS EDUARDO on 6/18/19. Continue immunosuppression and prophylaxis.     Immunosuppression  Continue prednisone.Holding tacrolimus in setting of LISBETH. Daily tacrolimus levels.     Prophylactic antibiotic  Continue voriconazole. 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    Chronic pain with opiate use  Continue fentanyl infusion and versed infusions.    CF related Pancreatic insufficiency  NPO while chemically paralyzed    LISBETH (acute kidney injury)  Nephrology following appreciate recs.     Bacteremia  Blood cultures from 8/26 with MRSA. Right chest port removed. Repeat blood cultures NGTD. ID following, appreciate recs. Continue vancomycin, Zerbaxa, and Tobramycin.         Preventive Measures: Nutrition: Goal: NPO, Head of Bet: elevated    Counseling/Consultation:I discussed the patient's condition/prognosis with the family.    Reyes Will NP  Lung Transplant  [facility]

## 2019-08-31 NOTE — PROGRESS NOTES
Dr. Alvarez and Mega charge RN notified that pt is maxed out on all pressors, Levo gtt @ 3 mcg/kg/min, called blood bank twice for the unit of blood, awaiting, MD coming to bedside to assess pt.     0047 Pts MAP decreasing to the 40s, HR dropped from 160s, to 130s, Mega, charge RN, float, 2 RTs and MD at bedside, pt PEA, turned from prone to supine, chest compressions started, RT manually bagging pt, 1 of Epi given and 3 amps of bicarb, after 1 round pt achieved ROSC HR 160s and maintaining SBP in the 180s. 2 more units of blood ordered per Dr. Alvarez and blood bank called.     0120: MD exchanged R Fem trialysis catheter and placed a new fem art line to L fem. R fem art line removed per order as pt more mottled, blotchy and unable to doppler pulses to that extremity. Vascular MD had previously assessed the leg @ 0000 and recommended removing the arterial line and obtaining a Duplex study and starting a Heparin gtt.

## 2019-08-31 NOTE — PROGRESS NOTES
CRRT clotted after 1hr 43mins. Cath daija dwelled for 1hr with no success. Venous port still unable to aspirate, arterial port with very sluggish flow. Will need new line for CRRT.

## 2019-08-31 NOTE — PROCEDURES
Juanita Ibarra is a 29 y.o. female patient.    Temp: 99.5 °F (37.5 °C) (08/31/19 0157)  Pulse: (!) 156 (08/31/19 0157)  Resp: (!) 35 (08/31/19 0000)  BP: (!) 105/59 (08/31/19 0129)  SpO2: (!) 90 % (08/31/19 0129)  Weight: 50.2 kg (110 lb 10.7 oz) (08/30/19 0500)  Height: 5' (152.4 cm) (08/27/19 0930)       Arterial Line  Date/Time: 8/31/2019 1:58 AM  Location procedure was performed: [facility] ICU (SICU)  Performed by: Jake Alvarez MD  Authorized by: Jake Alvarez MD   Consent Done: Emergent Situation  Indications: multiple ABGs and hemodynamic monitoring  Location: left femoral  Patient sedated: yes  Sedatives: midazolam and fentanyl  Needle gauge: 20  Seldinger technique: Seldinger technique used  Number of attempts: 2  Complications: No  Estimated blood loss (mL): 3  Specimens: No  Implants: No  Post-procedure: line sutured  Patient tolerance: Patient tolerated the procedure well with no immediate complications          Jake Alvarez  8/31/2019

## 2019-08-31 NOTE — SUBJECTIVE & OBJECTIVE
Interval History: Patient with profound decompensation last night with shock requiring max doses of multiple pressors, reintubation with paralysis/pronation, possible anemia of blood loss, and acidosis culminating in PEA arrest requiring 1 round of CPR. She remains critically ill on max support with difficulty dialyzing due to femoral cath clotting off.    Review of Systems   Unable to perform ROS: Intubated     Objective:     Vital Signs (Most Recent):  Temp: 99.3 °F (37.4 °C) (08/31/19 1400)  Pulse: (!) 144 (08/31/19 1400)  Resp: (!) 35 (08/31/19 0836)  BP: 117/67 (08/31/19 0930)  SpO2: (!) 91 % (08/31/19 1400) Vital Signs (24h Range):  Temp:  [98.4 °F (36.9 °C)-103.5 °F (39.7 °C)] 99.3 °F (37.4 °C)  Pulse:  [124-173] 144  Resp:  [24-35] 35  SpO2:  [61 %-100 %] 91 %  BP: ()/(41-85) 117/67  Arterial Line BP: ()/(30-68) 102/48     Weight: 50.2 kg (110 lb 10.7 oz)  Body mass index is 21.61 kg/m².    Estimated Creatinine Clearance: 53.7 mL/min (based on SCr of 1.1 mg/dL).    Physical Exam   Constitutional: She appears well-developed. She has a sickly appearance. She appears distressed.   Intubated, sedated, pronated, paralyzed, on max pressors, CRRT.   HENT:   Head: Normocephalic.   Neck: No tracheal deviation present.   Cardiovascular: Regular rhythm. Tachycardia present.   No murmur heard.  Pulmonary/Chest: She is in respiratory distress. She has decreased breath sounds in the right lower field. She has no wheezes. She has rhonchi in the right upper field, the right middle field, the left upper field and the left middle field. She has no rales.   Intubated, prone, paralyzed.   Abdominal:   Limited by pronation.   Genitourinary:   Genitourinary Comments: saleh   Musculoskeletal: She exhibits edema. She exhibits no deformity.   Neurological:   Paralyzed, sedated.   Skin: Skin is warm and dry. There is pallor.   Nursing note and vitals reviewed.      Significant Labs:   CBC:   Recent Labs   Lab  08/31/19  0400 08/31/19  0755 08/31/19  1151   WBC 7.36  7.36 11.18 17.10*   HGB 12.2  12.2 12.2 12.0   HCT 37.1  37.1 37.3 37.2   PLT 76*  76* 78* 69*     CMP:   Recent Labs   Lab 08/30/19  1359 08/30/19  2200  08/31/19  0400 08/31/19  0755 08/31/19  1151    133*  --  142  --   --    K 4.1 4.4  --  3.7  --   --     101  --  97  --   --    CO2 22* 13*  --  25  --   --    * 250*  --  198*  --   --    BUN 5* 8  --  11  --   --    CREATININE 0.6 0.9  --  1.1  --   --    CALCIUM 8.3* 7.2*  --  6.0*  --   --    PROT  --  5.2*   < > 4.1*  4.1* 3.7* 3.8*   ALBUMIN 2.5* 1.8*  1.8*   < > 1.5*  1.5*  1.5* 1.4* 1.4*   BILITOT  --  0.4   < > 0.6  0.6 0.5 0.4   ALKPHOS  --  173*   < > 165*  165* 167* 170*   AST  --  21   < > 1,586*  1,586* 3,528* 3,439*   ALT  --  8*   < > 363*  363* 534* 516*   ANIONGAP 14 19*  --  20*  --   --    EGFRNONAA >60.0 >60.0  --  >60.0  --   --     < > = values in this interval not displayed.       Significant Imaging: I have reviewed all pertinent imaging results/findings within the past 24 hours.

## 2019-08-31 NOTE — PROGRESS NOTES
CRRT restarted post machine change via right femoral catheter. Patient currently in prone position. Unable to pull from blue port. Alarm set and all lines secured.

## 2019-08-31 NOTE — ASSESSMENT & PLAN NOTE
BG goal 140 - 180     Continue low dose correction scale prn BG excursions  BG monitoring q 6 hrs while NPO    ** Please call Endocrine for any BG related issues **  ** Please notify Endocrine for any change and/or advance in diet**    Discharge planning: TBD

## 2019-08-31 NOTE — PROGRESS NOTES
[facility]  Infectious Disease  Progress Note    Patient Name: Juanita Ibarra  MRN: [id number]  Admission Date: 8/26/2019  Length of Stay: 5 days  Attending Physician: Jake Alvarez MD  Primary Care Provider: Primary Doctor No    Isolation Status: Contact and Neutropenic  Assessment/Plan:      * Septic shock  30yo woman w/a history of CF (c/b pancreatic insufficiency, sinus disease, MRSA/Pseudomonas colonization c/b numerous antibiotics allergies, and subsequent COPD/ESLD; s/p BOLT 6/12/2014, CMV D+/R-, simulect induction, on maintenance tacro/MMF/pred; c/b multiple episodes of MRSA/Pseudomonas pneumonia/sinusitis, C.glabrata early post-operative colonization 7/2014, A1 rejection 8/2014 s/p pulse SM, prior CMV reactivations, and subsequent grade 3 CARLOS EDUARDO; s/p redo BOLT 6/18/2019, CMV D-/R+, basiliximab induction, on maintenance tacro/MMF/pred; c/b  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 She suffered a profound decompensation on 8/30 with shock requiring max doses of multiple pressors, reintubation with paralysis/pronation, possible anemia of blood loss, and acidosis culminating in PEA arrest requiring 1 round of CPR despite empiric broadening of coverage to include vanc, zerbaxa, tobramycin, vori/micafungin. Given devastating nature of event without clear source at present, prognosis seems grim.    - would continue vancomycin (goal trough 15-20) for MRSA septicemia and pneumonia  - would continue empiric zerbaxa/tobra for now  - would continue voriconazole for prior invasive aspergillosis; await BAL antigen from recent bronch; repeat level  - may  continue micafungin while awaiting yeast ID   - remainder of prophylaxis per protocol  - with stabilization, patient will require SUZY and CT CAP to assess for infectious sources of decompensation  - await pending repeat cultures  - contact precautions for MRSA        Anticipated Disposition: pending improvement    Thank you for your consult. I will follow-up with patient. Please contact us if you have any additional questions.     Juanita Francis MD  Transplant ID Attending  [phone]    Juanita Francis MD  Infectious Disease  [facility]    Subjective:     Principal Problem:Septic shock    HPI: No notes on file  Interval History: Patient with profound decompensation last night with shock requiring max doses of multiple pressors, reintubation with paralysis/pronation, possible anemia of blood loss, and acidosis culminating in PEA arrest requiring 1 round of CPR. She remains critically ill on max support with difficulty dialyzing due to femoral cath clotting off.    Review of Systems   Unable to perform ROS: Intubated     Objective:     Vital Signs (Most Recent):  Temp: 99.3 °F (37.4 °C) (08/31/19 1400)  Pulse: (!) 144 (08/31/19 1400)  Resp: (!) 35 (08/31/19 0836)  BP: 117/67 (08/31/19 0930)  SpO2: (!) 91 % (08/31/19 1400) Vital Signs (24h Range):  Temp:  [98.4 °F (36.9 °C)-103.5 °F (39.7 °C)] 99.3 °F (37.4 °C)  Pulse:  [124-173] 144  Resp:  [24-35] 35  SpO2:  [61 %-100 %] 91 %  BP: ()/(41-85) 117/67  Arterial Line BP: ()/(30-68) 102/48     Weight: 50.2 kg (110 lb 10.7 oz)  Body mass index is 21.61 kg/m².    Estimated Creatinine Clearance: 53.7 mL/min (based on SCr of 1.1 mg/dL).    Physical Exam   Constitutional: She appears well-developed. She has a sickly appearance. She appears distressed.   Intubated, sedated, pronated, paralyzed, on max pressors, CRRT.   HENT:   Head: Normocephalic.   Neck: No tracheal deviation present.   Cardiovascular: Regular rhythm. Tachycardia present.   No  murmur heard.  Pulmonary/Chest: She is in respiratory distress. She has decreased breath sounds in the right lower field. She has no wheezes. She has rhonchi in the right upper field, the right middle field, the left upper field and the left middle field. She has no rales.   Intubated, prone, paralyzed.   Abdominal:   Limited by pronation.   Genitourinary:   Genitourinary Comments: saleh   Musculoskeletal: She exhibits edema. She exhibits no deformity.   Neurological:   Paralyzed, sedated.   Skin: Skin is warm and dry. There is pallor.   Nursing note and vitals reviewed.      Significant Labs:   CBC:   Recent Labs   Lab 08/31/19  0400 08/31/19  0755 08/31/19  1151   WBC 7.36  7.36 11.18 17.10*   HGB 12.2  12.2 12.2 12.0   HCT 37.1  37.1 37.3 37.2   PLT 76*  76* 78* 69*     CMP:   Recent Labs   Lab 08/30/19  1359 08/30/19  2200  08/31/19  0400 08/31/19  0755 08/31/19  1151    133*  --  142  --   --    K 4.1 4.4  --  3.7  --   --     101  --  97  --   --    CO2 22* 13*  --  25  --   --    * 250*  --  198*  --   --    BUN 5* 8  --  11  --   --    CREATININE 0.6 0.9  --  1.1  --   --    CALCIUM 8.3* 7.2*  --  6.0*  --   --    PROT  --  5.2*   < > 4.1*  4.1* 3.7* 3.8*   ALBUMIN 2.5* 1.8*  1.8*   < > 1.5*  1.5*  1.5* 1.4* 1.4*   BILITOT  --  0.4   < > 0.6  0.6 0.5 0.4   ALKPHOS  --  173*   < > 165*  165* 167* 170*   AST  --  21   < > 1,586*  1,586* 3,528* 3,439*   ALT  --  8*   < > 363*  363* 534* 516*   ANIONGAP 14 19*  --  20*  --   --    EGFRNONAA >60.0 >60.0  --  >60.0  --   --     < > = values in this interval not displayed.       Significant Imaging: I have reviewed all pertinent imaging results/findings within the past 24 hours.

## 2019-08-31 NOTE — SIGNIFICANT EVENT
Spiritual Care Encounter  Please contact the Department of Spiritual Care, your Unit  or the On-call  at any time if any needs are brought to your attention. i.e. Anytime you note spiritual, existential or emotional distress or there is a new diagnosis and/or prognosis.      Purpose:  To provide emotional, spiritual and/or existential support to the patient and/or their loved one's during a time of crisis, trauma, or death.  Visit Type: Initial Visit(Follow-up from previous chaplains)  Visit Category: Code Blue  Visited With: Family, Health care provider, Patient not available(Patient Coding)  Number of Family Visited: 2( and Mother)  Length of Visit: 102 Minutes  Continue Visiting: Yes  Referral From: Nurse(Juan J)  Referral To:      Assessment/Intervention:  I was paged by eICU stating that a patient was coding and family was present. Upon my arrival the patient had regained a functional heart-rate but other resuscitative and stabilizing actions were continuing to take place. The patients spouse was a bit outside of the patients room and tearful. I introduced myself and my role and inquired if there was anything directly, I could do to which he declined. The patients spouse informed me that her mother would be arriving shortly, upon her arrival I escorted her back and provided words of comfort and presence while the medical team continued their work. The patients family remained in a state of shock during the entire process but expressed the understanding that their loved one is in dire condition. The patients mother asked me to step into the room with them for a bit which I did and after a while encouraged them to ask for  support should they need anything further, also explaining the availably of the  and how we could be contacted. I offered continued prayer over the patient which was accepted readily.   Spiritual Resources Requested: Prayer(Supportive Presence)        Patient Spiritual Encounters  Care Provided: Other care (specify)(Patient unaware at time of visit)  Patient Coping: Non-verbal, Other (Comment)(Patient sediated)  Comments - Patient: Patient is sedated and medically paralized   Family Spiritual Encounters  Care Provided: Compassionate presence, Grief care, Explored pt/family concerns  Family Coping: Anxiety, Fearful, Non-verbal, Sadness, Living with uncertainty(Scared, tearful)  Comments - Family: Family was still in state of shock post-code          Plan of Care/Goals:  Follow-up is required due to the patient's rapid decline and families continued need of support at this time.  The patient and/or their family/support(s) were informed of how to contact the Spiritual Care Department as well as our availability and functions.  Please contact the Department of Spiritual Care, your Unit  or the On-call  at any time when you have concerns brought to your attention prior to our follow-up. i.e. Anytime you note spiritual, existential or emotional distress or there is a new diagnosis and/or prognosis.         Clary Mckeon    Office: [phone]  On-Call 24/7: [phone]  Department of Spiritual Care - [facility]

## 2019-08-31 NOTE — CARE UPDATE
Patient sats dropped to low 70's and pulse not felt. Patient turned supine, disconnected from vent and bagged with ambu bag w/ PEEP valve. Chest compressions started. Sats increased to mid 90's and pulse felt. Patient reconnected to vent and turned back to prone position.

## 2019-08-31 NOTE — ASSESSMENT & PLAN NOTE
Multifactorial given staph bacteremia and PNA. History of recurrent  pseudomonal infections and MRSA.    Patient s/p PEA arrest 8/31.  Continue Vancomycin, Tobramycin, and Zerbaxa.  Currently on multiple pressors to maintain MAPs >65. Remains on ventilator, chemically paralyzed.  Will continue supportive care.  Prognosis guarded.

## 2019-08-31 NOTE — ASSESSMENT & PLAN NOTE
Ms. 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 Her baseline SCr in the past year has been around ~ 1.0. LISBETH likely secondary to ATN caused by hypotension/sepsis in the setting of severe metabolic acidosis.    Recommendations:    - Strict I/O and chart  - Avoid nephrotoxic medications, NSAIDs, IV contrast, etc.  - Medication doses adjusted to GFR  - Maintain MAP > 65  - Patient's SaO2 dropped to 70s ~2 AM and coded, received 1 round of CPR.   - Had been receiving SLED 8/29-8/30 until about 4 pm on 8/30. Attempted restart SLED 8/31 prior to code, and after code, but both attempts unsuccessful due to clotting of femoral catheter.   -This AM, patient on vaso and levo and HR in 170s. ABG shows pH 7.29, CO2 56, HCO3 27--> resp acidosis. Net positive 7 L past 24 hours.  - Will hold RRT for now in setting of hemodynamic instability. Would consider placement of trialysis catheter in IJ for further RRT if patient becomes HD stable again

## 2019-08-31 NOTE — PROGRESS NOTES
Dr. Alvarez notified of drop in H&H from 7.9 and 26 to 6.4 and 21, per MD order will obtain blood consent, send Type and screen and order a unit of blood.     2330: Dr Alvarez notified of most recent ABG results pH 7.085, CO2 47, PaO2 82, bicarb 14.2, lactic acid 10.14, pt on Levo @ 2.3 mcg/kg/min, /47 (MAP 62). pt had to be rinsed back on CRRT @ 2230 due to clotting, cathFlo instilled in both lumens for an hour per Nephology, per MD order will adminsiter 100 meQ of Sodium bicarb IVP in the meantime.

## 2019-08-31 NOTE — PROGRESS NOTES
Dr. Alvarez at bedside. Updated on current gtts, VS, and assessment. Plan to supine pt at 1000. POC reviewed with family. Will continue to monitor.

## 2019-08-31 NOTE — PROCEDURES
Juanita Ibarra is a 29 y.o. female patient.    Temp: 99.8 °F (37.7 °C) (08/31/19 0120)  Pulse: (!) 152 (08/31/19 0129)  Resp: (!) 35 (08/31/19 0000)  BP: (!) 105/59 (08/31/19 0129)  SpO2: (!) 90 % (08/31/19 0129)  Weight: 50.2 kg (110 lb 10.7 oz) (08/30/19 0500)  Height: 5' (152.4 cm) (08/27/19 0930)       Intubation  Date/Time: 8/30/2019 6:00 PM  Location procedure was performed: [facility] SURGICAL ICU (SICU)  Performed by: Jake Alvarez MD  Authorized by: Jake Alvarez MD   Pre-operative diagnosis: Acute Respiratory Failure  Consent Done: Not Needed  Indications: respiratory failure  Intubation method: video-assisted  Patient status: sedated  Preoxygenation: nonrebreather mask  Sedatives: etomidate  Paralytic: rocuronium  Laryngoscope size: Mac 4  Tube size: 7.5 mm  Tube type: cuffed  Number of attempts: 1  Cricoid pressure: no  Cords visualized: yes  Post-procedure assessment: chest rise and ETCO2 monitor  Breath sounds: diminished bilaterally  Cuff inflated: yes  ETT to lip: 21 cm  Tube secured with: ETT figueroa  Chest x-ray findings: endotracheal tube in appropriate position          Jake Alvarez  8/31/2019

## 2019-08-31 NOTE — PROGRESS NOTES
[facility]  Endocrinology  Progress Note    Admit Date: 8/26/2019     Reason for Consult: Management of Steroid induced Hyperglycemia     Surgical Procedure and Date:   Bilateral re-transplant of Lung    Diabetes diagnosis year: No dx of dm. Steroid induced hyperglycemia secondary to lung transplant treatment.     Home Diabetes Medications:    Januvia 100 mg daily   Novolog 4 units TIDWM    How often checking glucose at home? Not checking  BG readings on regimen: Unknown  Hypoglycemia on the regimen?  No  Missed doses on regimen?  Yes    Diabetes Complications include:     Hyperglycemia    Complicating diabetes co morbidities:   Glucocorticoid use       HPI:   Patient is a 29 y.o. female with a diagnosis of   MsAditi Ibarra is a 28 yo female s/p bilateral re-transplant (6/18/19) who presented to the ED in acute respiratory distress 08/26/2019. Per patient's caregiver, patient was doing well since her hospital discharge, but reports fevers (tmax 101.5) x 2 days, cough with occasional thin sputum, and severe dyspnea requiring >5L oxygen over the weekend. Patient's caregiver also reports poor appetite and PO intake over the last week. Patient has previous history of steroid sensitivity during admission in regards to glycemic control. Endocrinology consulted to manage hyperglycemia during admssion.     Lab Results   Component Value Date    HGBA1C 4.7 01/09/2019       Interval HPI:   Overnight events: Remains in SICU. Re-intubated overnight.  BG controlled with no insulin requirements. Receiving Prednisone 15 mg daily.   Eating:   NPO  Nausea: No  Hypoglycemia and intervention: No  Fever: Yes, 102.2  TPN and/or TF: No  If yes, type of TF/TPN and rate: none    /67   Pulse (!) 164   Temp (!) 102.2 °F (39 °C)   Resp (!) 35   Ht 5' (1.524 m)   Wt 50.2 kg (110 lb 10.7 oz)   LMP 10/02/2016 (LMP Unknown)   SpO2 (!) 82%   Breastfeeding? No   BMI 21.61 kg/m²      Labs Reviewed and Include     Recent Labs   Lab 08/31/19  0400 08/31/19  0755   *  --    CALCIUM 6.0*  --    ALBUMIN 1.5*  1.5*  1.5* 1.4*   PROT 4.1*  4.1* 3.7*     --    K 3.7  --    CO2 25  --    CL 97  --    BUN 11  --    CREATININE 1.1  --    ALKPHOS 165*  165* 167*   *  363* 534*   AST 1,586*  1,586* 3,528*   BILITOT 0.6  0.6 0.5     Lab Results   Component Value Date    WBC 11.18 08/31/2019    HGB 12.2 08/31/2019    HCT 37.3 08/31/2019    MCV 89 08/31/2019    PLT 78 (L) 08/31/2019     Recent Labs   Lab 08/26/19  0847   TSH 1.120     Lab Results   Component Value Date    HGBA1C 4.7 01/09/2019       Nutritional status:   Body mass index is 21.61 kg/m².  Lab Results   Component Value Date    ALBUMIN 1.4 (L) 08/31/2019    ALBUMIN 1.5 (L) 08/31/2019    ALBUMIN 1.5 (L) 08/31/2019    ALBUMIN 1.5 (L) 08/31/2019     Lab Results   Component Value Date    PREALBUMIN 15 (L) 05/29/2014    PREALBUMIN 11 (L) 05/09/2014    PREALBUMIN 18 (L) 03/19/2014       Estimated Creatinine Clearance: 53.7 mL/min (based on SCr of 1.1 mg/dL).    Accu-Checks  Recent Labs     08/29/19  1420 08/29/19  1632 08/29/19  2009 08/30/19  0011 08/30/19  0402 08/30/19  0826 08/30/19  1221 08/30/19  1625 08/30/19  1752 08/31/19  0023   POCTGLUCOSE 143* 176* 129* 131* 128* 192* 117* 111* 91 133*       Current Medications and/or Treatments Impacting Glycemic Control  Immunotherapy:    Immunosuppressants     None        Steroids:   Hormones (From admission, onward)    Start     Stop Route Frequency Ordered    08/30/19 2330  angiotensin II (GIAPREZA) 2,500,000 ng in sodium chloride 0.9% 250 mL infusion     Question Answer Comment   Titrate by: (in mcg/kg/min) 10    Titrate interval: (in minutes) 5    Titrate to maintain: (MAP or SBP) MAP    Greater than: (in mmHg) 65    Maximum dose: (in mcg/kg/min) 40    Name of CCM Attending Provider giving approval: Jake Alvarez    Angiotensin II Infusion Adjustment (DO NOT MODIFY ANSWER)  [email or web address]\Angiotensin II [email or web address]        -- IV Continuous 08/30/19 2225 08/27/19 0900  predniSONE tablet 15 mg      -- OG Daily 08/26/19 1106    08/26/19 1031  vasopressin (PITRESSIN) 0.2 Units/mL in dextrose 5 % 100 mL infusion      -- IV Continuous 08/26/19 1031        Pressors:    Autonomic Drugs (From admission, onward)    Start     Stop Route Frequency Ordered    08/30/19 2045  norepinephrine 16 mg in dextrose 5 % 250 mL infusion     Question Answer Comment   Titrate by: (in mcg/kg/min) 0.05    Titrate interval: (in minutes) 5    Titrate to maintain: (MAP or SBP) MAP    Greater than: (in mmHg) 65    Maximum dose: (in mcg/kg/min) 3        -- IV Continuous 08/30/19 2040 08/30/19 1854  cisatracurium (NIMBEX) 200 mg in dextrose 5 % 100 mL infusion     Question Answer Comment   Bolus over 1 minute (in mg): 0    Begin at (in mcg/kg/min): 1    Titrate by: (in mcg/kg/min) 0.5    Titrate interval: (in minutes) 15    To maintain a train-of-four of (TOF_/4): 2/4    Maximum dose: (in mcg/kg/min) 10        -- IV Continuous 08/30/19 1854        Hyperglycemia/Diabetes Medications:   Antihyperglycemics (From admission, onward)    Start     Stop Route Frequency Ordered    08/30/19 1721  insulin aspart U-100 pen 0-5 Units      -- SubQ Every 6 hours PRN 08/30/19 1621    08/26/19 1515  insulin aspart U-100 (NovoLOG) 100 unit/mL (3 mL) pen     Note to Pharmacy:  Created by cabinet override    08/27 0329   08/26/19 1515          ASSESSMENT and PLAN    * Septic shock  Infection may elevate BG readings  Managed per primary team  Avoid hypoglycemia        Acute hyperglycemia  BG goal 140 - 180     Continue low dose correction scale prn BG excursions  BG monitoring q 6 hrs while NPO    ** Please call Endocrine for any BG related issues **  ** Please notify Endocrine for any change and/or advance in diet**    Discharge planning: TBD        LISBETH (acute kidney injury)  Caution with insulin  stacking            Argentina Stevens NP  Endocrinology  [facility]

## 2019-08-31 NOTE — SUBJECTIVE & OBJECTIVE
Interval HPI:   Overnight events: Remains in SICU. Re-intubated overnight.  BG controlled with no insulin requirements. Receiving Prednisone 15 mg daily.   Eating:   NPO  Nausea: No  Hypoglycemia and intervention: No  Fever: Yes, 102.2  TPN and/or TF: No  If yes, type of TF/TPN and rate: none    /67   Pulse (!) 164   Temp (!) 102.2 °F (39 °C)   Resp (!) 35   Ht 5' (1.524 m)   Wt 50.2 kg (110 lb 10.7 oz)   LMP 10/02/2016 (LMP Unknown)   SpO2 (!) 82%   Breastfeeding? No   BMI 21.61 kg/m²     Labs Reviewed and Include    Recent Labs   Lab 08/31/19  0400 08/31/19  0755   *  --    CALCIUM 6.0*  --    ALBUMIN 1.5*  1.5*  1.5* 1.4*   PROT 4.1*  4.1* 3.7*     --    K 3.7  --    CO2 25  --    CL 97  --    BUN 11  --    CREATININE 1.1  --    ALKPHOS 165*  165* 167*   *  363* 534*   AST 1,586*  1,586* 3,528*   BILITOT 0.6  0.6 0.5     Lab Results   Component Value Date    WBC 11.18 08/31/2019    HGB 12.2 08/31/2019    HCT 37.3 08/31/2019    MCV 89 08/31/2019    PLT 78 (L) 08/31/2019     Recent Labs   Lab 08/26/19  0847   TSH 1.120     Lab Results   Component Value Date    HGBA1C 4.7 01/09/2019       Nutritional status:   Body mass index is 21.61 kg/m².  Lab Results   Component Value Date    ALBUMIN 1.4 (L) 08/31/2019    ALBUMIN 1.5 (L) 08/31/2019    ALBUMIN 1.5 (L) 08/31/2019    ALBUMIN 1.5 (L) 08/31/2019     Lab Results   Component Value Date    PREALBUMIN 15 (L) 05/29/2014    PREALBUMIN 11 (L) 05/09/2014    PREALBUMIN 18 (L) 03/19/2014       Estimated Creatinine Clearance: 53.7 mL/min (based on SCr of 1.1 mg/dL).    Accu-Checks  Recent Labs     08/29/19  1420 08/29/19  1632 08/29/19  2009 08/30/19  0011 08/30/19  0402 08/30/19  0826 08/30/19  1221 08/30/19  1625 08/30/19  1752 08/31/19  0023   POCTGLUCOSE 143* 176* 129* 131* 128* 192* 117* 111* 91 133*       Current Medications and/or Treatments Impacting Glycemic Control  Immunotherapy:    Immunosuppressants     None         Steroids:   Hormones (From admission, onward)    Start     Stop Route Frequency Ordered    08/30/19 2330  angiotensin II (GIAPREZA) 2,500,000 ng in sodium chloride 0.9% 250 mL infusion     Question Answer Comment   Titrate by: (in mcg/kg/min) 10    Titrate interval: (in minutes) 5    Titrate to maintain: (MAP or SBP) MAP    Greater than: (in mmHg) 65    Maximum dose: (in mcg/kg/min) 40    Name of [facility] Attending Provider giving approval: Jake Alvarez    Angiotensin II Infusion Adjustment (DO NOT MODIFY ANSWER) [email or web address]\Angiotensin II Titration.pdf        -- IV Continuous 08/30/19 2225    08/27/19 0900  predniSONE tablet 15 mg      -- OG Daily 08/26/19 1106    08/26/19 1031  vasopressin (PITRESSIN) 0.2 Units/mL in dextrose 5 % 100 mL infusion      -- IV Continuous 08/26/19 1031        Pressors:    Autonomic Drugs (From admission, onward)    Start     Stop Route Frequency Ordered    08/30/19 2045  norepinephrine 16 mg in dextrose 5 % 250 mL infusion     Question Answer Comment   Titrate by: (in mcg/kg/min) 0.05    Titrate interval: (in minutes) 5    Titrate to maintain: (MAP or SBP) MAP    Greater than: (in mmHg) 65    Maximum dose: (in mcg/kg/min) 3        -- IV Continuous 08/30/19 2040 08/30/19 1854  cisatracurium (NIMBEX) 200 mg in dextrose 5 % 100 mL infusion     Question Answer Comment   Bolus over 1 minute (in mg): 0    Begin at (in mcg/kg/min): 1    Titrate by: (in mcg/kg/min) 0.5    Titrate interval: (in minutes) 15    To maintain a train-of-four of (TOF_/4): 2/4    Maximum dose: (in mcg/kg/min) 10        -- IV Continuous 08/30/19 1854        Hyperglycemia/Diabetes Medications:   Antihyperglycemics (From admission, onward)    Start     Stop Route Frequency Ordered    08/30/19 1721  insulin aspart U-100 pen 0-5 Units      -- SubQ Every 6 hours PRN 08/30/19 1621    08/26/19 1515  insulin aspart U-100 (NovoLOG) 100 unit/mL (3 mL) pen     Note to Pharmacy:  Created by cabinet  override    08/27 0329   08/26/19 1439

## 2019-08-31 NOTE — CARE UPDATE
Pt. Turned to supine position. Dr. Alvarez at bedside during pt. Turning. Will continue to monitor.

## 2019-08-31 NOTE — ASSESSMENT & PLAN NOTE
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 She suffered a profound decompensation on 8/30 with shock requiring max doses of multiple pressors, reintubation with paralysis/pronation, possible anemia of blood loss, and acidosis culminating in PEA arrest requiring 1 round of CPR despite empiric broadening of coverage to include vanc, zerbaxa, tobramycin, vori/micafungin. Given devastating nature of event without clear source at present, prognosis seems grim.    - would continue vancomycin (goal trough 15-20) for MRSA septicemia and pneumonia  - would continue empiric zerbaxa/tobra for now  - would continue voriconazole for prior invasive aspergillosis; await BAL antigen from recent bronch; repeat level  - may continue micafungin while awaiting yeast ID   - remainder of prophylaxis per protocol  - with stabilization, patient will require SUZY and CT CAP to assess for infectious sources of decompensation  - await pending repeat cultures  - contact precautions for MRSA

## 2019-08-31 NOTE — PROGRESS NOTES
Dr. Alvarez at bedside for rounds. MD aware of VS, gtts, net I/O, labs including ABGs and overall pt status. Pt placed on spontaneous on ventilator and propofol and fentanyl gtts stopped. Plan to wean to extubate.

## 2019-08-31 NOTE — PROCEDURES
Juanita Ibarra is a 29 y.o. female patient.    Temp: 99.5 °F (37.5 °C) (08/31/19 0157)  Pulse: (!) 156 (08/31/19 0157)  Resp: (!) 35 (08/31/19 0000)  BP: (!) 105/59 (08/31/19 0129)  SpO2: (!) 90 % (08/31/19 0129)  Weight: 50.2 kg (110 lb 10.7 oz) (08/30/19 0500)  Height: 5' (152.4 cm) (08/27/19 0930)       Central Line  Date/Time: 8/31/2019 2:00 AM  Location procedure was performed: [facility] SURG[facility] ICU (SICU)  Performed by: Jake Alvarez MD  Consent Done: Emergent Situation  Indications: hemodialysis  Preparation: skin prepped with ChloraPrep  Location details: right femoral  Site selection rationale: only site avaialabe.Changed over the wire  Catheter type: trialysis  Catheter Length: 20cm    Ultrasound guidance: no  Manometry: No   Number of attempts: 1  Estimated blood loss (mL): 0  Specimens: No  Implants: No  Post-procedure: line sutured and chlorhexidine patch  Complications: No          Jake Alvarez  8/31/2019

## 2019-08-31 NOTE — PROGRESS NOTES
Dr. Alvarez notified of 2200 ABG results, also on 1.3 mcg/kg/min of Levo and 0.04 Units/min of vaso, per MD order administer 500 mL NS bolus. MD also ordered Angiotensin II gtt.

## 2019-08-31 NOTE — PROGRESS NOTES
Pharmacokinetic Initial Assessment: Tobramycin    Assessment:  Weight utilized for dose calculation: Ideal Body Weight  Dosing method utilized: extended interval dosing    Plan: Extended interval dosing regimen: Tobramycin 320 mg IV once, followed by a random level to be drawn on 8/31 at 1300, 8-12 hours after the first dose.    Pharmacy will continue to monitor.    Please contact pharmacy at extension [phone] with any questions regarding this assessment.    Thank you for the consult,    Pradip OMALLEY Juan       Patient brief summary:  Juanita Ibarra is a 29 y.o. female initiated on aminoglycoside therapy for treatment of suspected pneumonia    Of note: for dosing recommendations in consults related to pulmonary exacerbations due to Cystic Fibrosis, please contact Infectious Disease (ID) or contact ID pharmacy.    Drug Allergies:   Review of patient's allergies indicates:   Allergen Reactions    Albuterol Palpitations    Colistin Anaphylaxis    Vancomycin analogues      Infusion reaction that does not resolve with slowing  Pt. States she can tolerate it when given with 50 mg Benadryl and ran over 3 hours    Neupogen [filgrastim] Other (See Comments)     Ostealgia after five daily doses of 300 mcg.      Bactrim [sulfamethoxazole-trimethoprim] Hives    Ceftazidime Hives     Pt stated can tolerate cefapine not ceftazidime    Ceftazidime     Dronabinol Other (See Comments)     Mental changes/hallucinations    Haldol [haloperidol lactate] Other (See Comments)     Seizure like activity    Nsaids (non-steroidal anti-inflammatory drug)      Cannot have due to lung transplant    Adhesive Rash     Cloth tape- please use tegaderm or paper tape    Aztreonam Rash    Ciprofloxacin Nausea And Vomiting     Projectile N/V, per patient.  Unwilling to retry therapy.       Actual Body Weight:   50.2kg     Adjust Body Weight:   47.4kg    Ideal Body Weight:  45.5kg    Renal Function:   Estimated Creatinine Clearance:  65.7 mL/min (based on SCr of 0.9 mg/dL).,     Dialysis Method (if applicable):  N/A    CBC (last 72 hours):  Recent Labs   Lab Result Units 08/28/19  0415 08/29/19  0300 08/30/19  0301 08/30/19  1745 08/30/19  2200 08/31/19  0142   WBC K/uL 4.67 5.71 12.71* 10.66 9.24 10.07   Hemoglobin g/dL 8.1* 8.0* 8.3* 7.9* 6.4* 7.4*   Hematocrit % 26.2* 25.7* 27.2* 26.0* 21.8* 24.6*   Platelets K/uL 117* 124* 153 148* 140* 94*   Gran% % 87.1* 89.2* 86.0* 86.2* 63.0  --    Lymph% % 6.9* 5.6* 7.4* 7.9* 12.0*  --    Mono% % 4.9 3.9* 5.1 4.1 2.0*  --    Eosinophil% % 0.0 0.0 0.0 0.0 0.0  --    Basophil% % 0.0 0.2 0.4 0.6 1.0  --    Differential Method  Automated Automated Automated Automated Manual  --        Metabolic Panel (last 72 hours):  Recent Labs   Lab Result Units 08/28/19  0415 08/28/19  1425 08/28/19  2200 08/29/19  0300 08/29/19  1430 08/29/19  2206 08/30/19  0301 08/30/19  1359 08/30/19  2200 08/31/19  0142   Sodium mmol/L 139 138 135* 136 136 138 138 138 133*  --    Potassium mmol/L 4.9 4.9 3.9 4.4 4.3 4.3 4.3 4.1 4.4  --    Chloride mmol/L 104 108 107 108 103 102 103 102 101  --    CO2 mmol/L 21* 17* 11* 11* 17* 18* 21* 22* 13*  --    Glucose mg/dL 152* 201* 233* 161* 149* 123* 119* 115* 250*  --    BUN, Bld mg/dL 4* 4* 16 19 5* 2* 3* 5* 8  --    Creatinine mg/dL 0.6 0.5 1.0 1.2 0.6 0.5 0.5 0.6 0.9  --    Albumin g/dL 2.5*  2.5* 2.3* 2.4* 2.5*  2.5* 2.4* 2.6* 2.7*  2.7* 2.5* 1.8*  1.8* 1.5*   Total Bilirubin mg/dL 0.4  --   --  0.3  --   --  0.4  --  0.4 0.4   Alkaline Phosphatase U/L 173*  --   --  174*  --   --  195*  --  173* 147*   AST U/L 15  --   --  16  --   --  17  --  21 83*   ALT U/L 10  --   --  11  --   --  10  --  8* 20   Magnesium mg/dL 2.1 2.0 2.1 2.0 2.0 1.9 1.9 1.9 3.0*  --    Phosphorus mg/dL 3.8 1.9* 4.2 4.7* 1.3* 3.5 1.7* 4.0 5.8*  --        Microbiologic Results:  Microbiology Results (last 7 days)     Procedure Component Value Units Date/Time    Blood culture [[id number]] Collected:  08/30/19  1445    Order Status:  Completed Specimen:  Blood from Peripheral, Forearm, Right Updated:  08/31/19 0115     Blood Culture, Routine No Growth to date    Blood culture [[id number]] Collected:  08/30/19 1450    Order Status:  Completed Specimen:  Blood from Peripheral, Hand, Left Updated:  08/31/19 0115     Blood Culture, Routine No Growth to date    Blood culture [[id number]] Collected:  08/28/19 1250    Order Status:  Completed Specimen:  Blood from Peripheral, Hand, Right Updated:  08/30/19 1612     Blood Culture, Routine No Growth to date      No Growth to date      No Growth to date    Blood culture [[id number]]  (Abnormal) Collected:  08/28/19 1020    Order Status:  Completed Specimen:  Blood from Peripheral, Wrist, Right Updated:  08/30/19 0959     Blood Culture, Routine Gram stain aer bottle: Gram positive cocci in clusters resembling Staph      Results called to and read back by:Lashanda Rios RN 08/29/2019  17:38      STAPHYLOCOCCUS AUREUS  Susceptibility pending  ID consult required at [facility] and [facility].      Blood culture #1 **CANNOT BE ORDERED STAT** [[id number]]  (Abnormal) Collected:  08/26/19 0848    Order Status:  Completed Specimen:  Blood from Line, Port A Cath Updated:  08/29/19 1111     Blood Culture, Routine Gram stain lisa bottle: Gram positive cocci in clusters resembling Staph       Results called to and read back by:Monet Martinez RN 08/27/2019  06:04      Gram stain aer bottle: Gram positive cocci in clusters resembling Staph       08/28/2019  10:29      METHICILLIN RESISTANT STAPHYLOCOCCUS AUREUS  ID consult required at [facility].  For susceptibility see order #[id number]      Blood culture #2 **CANNOT BE ORDERED STAT** [[id number]]  (Abnormal)  (Susceptibility) Collected:  08/26/19 0859    Order Status:  Completed Specimen:  Blood from Wrist, Right Updated:  08/29/19 1110     Blood Culture, Routine Gram stain aer  bottle: Gram positive cocci in clusters resembling Staph       Results called to and read back by:Monet Martinez RN 08/27/2019 03:23      Gram stain lisa bottle: Gram positive cocci in clusters resembling Staph       08/27/2019  11:38      METHICILLIN RESISTANT STAPHYLOCOCCUS AUREUS  ID consult required at [facility],[place],[facility] and [facility]   locations.      Culture, Respiratory [[id number]]  (Abnormal)  (Susceptibility) Collected:  08/26/19 1441    Order Status:  Completed Specimen:  Respiratory from Bronchial Wash Updated:  08/28/19 1213     Respiratory Culture METHICILLIN RESISTANT STAPHYLOCOCCUS AUREUS  Many       Gram Stain (Respiratory) <10 epithelial cells per low power field.     Gram Stain (Respiratory) Few WBC's     Gram Stain (Respiratory) Many Gram positive cocci    Narrative:       Bronchial Wash    Fungus culture [[id number]]  (Abnormal) Collected:  08/26/19 1441    Order Status:  Completed Specimen:  Respiratory from Bronchial Wash Updated:  08/28/19 1101     Fungus (Mycology) Culture YEAST   Many  Identification pending      Narrative:       Bronchial Wash    AFB Culture & Smear [[id number]] Collected:  08/26/19 1441    Order Status:  Completed Specimen:  Respiratory from Bronchial Wash Updated:  08/27/19 2127     AFB Culture & Smear Culture in progress     AFB CULTURE STAIN No acid fast bacilli seen.    Narrative:       Bronchial Wash    Respiratory Infection Panel, Nasopharyngeal [[id number]] Collected:  08/26/19 1003    Order Status:  Completed Specimen:  Nasopharyngeal Swab Updated:  08/26/19 1344     Respiratory Infection Panel Source NP Swab     Adenovirus Not Detected     Coronavirus 229E Not Detected     Coronavirus HKU1 Not Detected     Coronavirus NL63 Not Detected     Coronavirus OC43 Not Detected     Human Metapneumovirus Not Detected     Human Rhinovirus/Enterovirus Not Detected     Influenza A (subtypes H1, H1-2009,H3) Not Detected     Influenza B Not Detected     Parainfluenza Virus  1 Not Detected     Parainfluenza Virus 2 Not Detected     Parainfluenza Virus 3 Not Detected     Parainfluenza Virus 4 Not Detected     Respiratory Syncytial Virus Not Detected     Bordetella Parapertussis (MI4961) Not Detected     Bordetella pertussis (ptxP) Not Detected     Chlamydia pneumoniae Not Detected     Mycoplasma pneumoniae Not Detected    Narrative:       For all other respiratory sources order [id number] Respiratory  Viral Panel by PCR (RVPCR)    Culture, Respiratory  - Cystic Fibrosis [[id number]]     Order Status:  Canceled Specimen:  Respiratory

## 2019-09-01 PROBLEM — I99.8 ISCHEMIC LEG: Status: ACTIVE | Noted: 2019-01-01

## 2019-09-01 PROBLEM — I70.209 ARTERIAL OCCLUSION, LOWER EXTREMITY: Status: ACTIVE | Noted: 2019-01-01

## 2019-09-01 NOTE — PROGRESS NOTES
[facility]  Lung Transplant  Progress Note - Critical Care    Patient Name: Juanita Ibarra  MRN: [id number]  Admission Date: 8/26/2019  Hospital Length of Stay: 6 days  Post-Operative Day: 1907 (Lung), 75 (Lung)  Attending Physician: Jake Alvarez MD  Primary Care Provider: Primary Doctor No     Subjective:     Interval History: No new events overnight.  Condition remains guarded    Continuous Infusions:   sodium chloride 0.9% 200 mL/hr at 09/01/19 0900    angiotensin II 40 ng/kg/min (08/31/19 2000)    cisatracurium (NIMBEX) infusion 1.5 mcg/kg/min (09/01/19 1200)    dexmedetomidine (PRECEDEX) infusion Stopped (08/30/19 1800)    fentanyl 175 mcg/hr (09/01/19 1200)    heparin (porcine) in 5 % dex 300 Units/hr (09/01/19 1200)    midazolam (VERSED) infusion (non-titrating) 3 mg/hr (09/01/19 1200)    norepinephrine bitartrate-D5W 0.69 mcg/kg/min (09/01/19 1200)    phenylephrine Stopped (09/01/19 1000)    sodium bicarbonate drip Stopped (08/31/19 2100)    vasopressin (PITRESSIN) infusion 0.04 Units/min (09/01/19 1200)     Scheduled Meds:   carboxymethylcellulose sodium   Both Eyes TID    ceftolozane-tazobactam  1,500 mg Intravenous Q8H    chlorhexidine  15 mL Mouth/Throat BID    heparin (porcine)  5,000 Units Subcutaneous Q12H    hydrocortisone sodium succinate  50 mg Intravenous Q6H    levalbuterol  1.25 mg Nebulization Q8H    micafungin (MYCAMINE) IVPB  100 mg Intravenous Q24H    sodium chloride 0.9%  1,000 mL Intravenous Once    sodium chloride 0.9%  500 mL Intravenous Once    vorconazole (VFEND) IVPB  4 mg/kg Intravenous Q12H     PRN Meds:sodium chloride, sodium chloride, dextrose 50%, diphenhydrAMINE, diphenhydrAMINE, glucagon (human recombinant), HYDROmorphone, insulin aspart U-100, LORazepam, magnesium sulfate IVPB, metoprolol, ondansetron, oxyCODONE, promethazine (PHENERGAN) IVPB, sodium phosphate IVPB, sodium phosphate IVPB, sodium phosphate IVPB    Review of  patient's allergies indicates:   Allergen Reactions    Albuterol Palpitations    Colistin Anaphylaxis    Vancomycin analogues      Infusion reaction that does not resolve with slowing  Pt. States she can tolerate it when given with 50 mg Benadryl and ran over 3 hours    Neupogen [filgrastim] Other (See Comments)     Ostealgia after five daily doses of 300 mcg.      Bactrim [sulfamethoxazole-trimethoprim] Hives    Ceftazidime Hives     Pt stated can tolerate cefapine not ceftazidime    Ceftazidime     Dronabinol Other (See Comments)     Mental changes/hallucinations    Haldol [haloperidol lactate] Other (See Comments)     Seizure like activity    Nsaids (non-steroidal anti-inflammatory drug)      Cannot have due to lung transplant    Adhesive Rash     Cloth tape- please use tegaderm or paper tape    Aztreonam Rash    Ciprofloxacin Nausea And Vomiting     Projectile N/V, per patient.  Unwilling to retry therapy.       Review of Systems   Unable to perform ROS: Intubated     Objective:   Physical Exam   Constitutional: She appears well-developed and well-nourished. She appears ill. She is sedated, chemically paralyzed and intubated.   HENT:   Head: Normocephalic and atraumatic.   ET and OG tubes in place   Eyes: Right eye exhibits no discharge. Left eye exhibits no discharge. No scleral icterus.   Neck: Neck supple.   Cardiovascular: Normal rate and regular rhythm.   Pulmonary/Chest: She is intubated. No respiratory distress. She has no wheezes. She has rales.   Mechanical ventilator sounds   Abdominal: Soft. Bowel sounds are normal.   Musculoskeletal: She exhibits edema (generalized). She exhibits no tenderness or deformity.   Skin: Skin is warm. There is pallor.   Right leg with mottling noted.   Nursing note and vitals reviewed.        Vital Signs (Most Recent):  Temp: 99.4 °F (37.4 °C) (09/01/19 1200)  Pulse: (!) 137 (09/01/19 1200)  Resp: (!) 40 (08/31/19 2321)  BP: (!) 88/53 (09/01/19 1200)  SpO2: 97  % (09/01/19 1200) Vital Signs (24h Range):  Temp:  [97.7 °F (36.5 °C)-102 °F (38.9 °C)] 99.4 °F (37.4 °C)  Pulse:  [127-160] 137  Resp:  [40] 40  SpO2:  [56 %-100 %] 97 %  BP: ()/(50-62) 88/53  Arterial Line BP: ()/(43-56) 105/48     Weight: 70 kg (154 lb 5.2 oz)  Body mass index is 30.14 kg/m².      Intake/Output Summary (Last 24 hours) at 9/1/2019 1229  Last data filed at 9/1/2019 1200  Gross per 24 hour   Intake 13039.14 ml   Output 3827 ml   Net 8135.14 ml       Ventilator Data:     Vent Mode: A/C  Oxygen Concentration (%):  [59-98] 59  Resp Rate Total:  [35 br/min-40 br/min] 35 br/min  Vt Set:  [280 mL-320 mL] 320 mL  PEEP/CPAP:  [5 cmH20-15 cmH20] 5 cmH20  Mean Airway Pressure:  [14 xeB51-22 cmH20] 14 cmH20    Hemodynamic Parameters:       Lines/Drains:       Hemodialysis Catheter 08/31/19 1330 right femoral (Active)   Site Assessment Clean;Dry;Intact;No redness;No swelling 9/1/2019 11:15 AM   Status Accessed 9/1/2019 11:15 AM   Flows Good 9/1/2019  3:00 AM   Dressing Intervention New dressing 8/31/2019  3:15 PM   Dressing Status Biopatch in place;Clean;Dry;Intact 9/1/2019 11:15 AM   Dressing Change Due 09/07/19 9/1/2019  3:00 AM   Site Condition No complications 9/1/2019 11:15 AM   Dressing Occlusive 9/1/2019  3:00 AM   Daily Line Review Performed 9/1/2019  3:00 AM   Number of days: 0            Percutaneous Central Line Insertion/Assessment - triple lumen  08/26/19 1135 left internal jugular (Active)   Dressing biopatch in place;dressing dry and intact 9/1/2019 11:15 AM   Securement secured w/ sutures 9/1/2019 11:15 AM   Additional Site Signs no erythema;no warmth;no edema;no pain;no palpable cord;no streak formation;no drainage 9/1/2019 11:15 AM   Distal Patency/Care infusing 9/1/2019 11:15 AM   Medial Patency/Care infusing 9/1/2019 11:15 AM   Proximal Patency/Care infusing 9/1/2019 11:15 AM   Waveform normal 8/30/2019  3:15 PM   Line Interventions line leveled/zeroed 9/1/2019 11:15 AM    Dressing Change Due 09/02/19 9/1/2019 11:15 AM   Daily Line Review Performed 9/1/2019 11:15 AM   Number of days: 6            Peripheral IV - Single Lumen 08/26/19 1119 20 G Anterior;Right Forearm (Active)   Site Assessment Clean;Dry;Intact;No redness;No swelling 9/1/2019 11:15 AM   Line Status Infusing 9/1/2019 11:15 AM   Dressing Status Clean;Dry;Intact 9/1/2019 11:15 AM   Dressing Intervention Dressing reinforced 8/28/2019  3:00 PM   Dressing Change Due 08/30/19 9/1/2019  3:00 AM   Site Change Due 09/30/19 8/31/2019  7:00 PM   Reason Not Rotated Poor venous access 9/1/2019 11:15 AM   Number of days: 6            Peripheral IV - Single Lumen 08/30/19 1600 20 G Right Forearm (Active)   Site Assessment Clean;Dry;Intact;No redness;No swelling 9/1/2019 11:15 AM   Line Status Infusing 9/1/2019 11:15 AM   Dressing Status Clean;Dry;Intact 9/1/2019 11:15 AM   Dressing Change Due 09/03/19 9/1/2019 11:15 AM   Site Change Due 09/03/19 8/31/2019  7:00 PM   Reason Not Rotated Not due 9/1/2019 11:15 AM   Number of days: 1            Arterial Line 08/31/19 0120 Left Femoral (Active)   Site Assessment Clean;Dry;Intact;No redness;No swelling 9/1/2019 11:15 AM   Line Status Pulsatile blood flow 9/1/2019 11:15 AM   Art Line Waveform Appropriate 9/1/2019 11:15 AM   Arterial Line Interventions Zeroed and calibrated;Leveled 9/1/2019 11:15 AM   Color/Movement/Sensation Cool fingers/toes;Dusky fingers/toes 9/1/2019 11:15 AM   Dressing Type Transparent 9/1/2019 11:15 AM   Dressing Status Clean;Dry;Intact 9/1/2019 11:15 AM   Dressing Intervention New dressing 9/1/2019  3:00 AM   Dressing Change Due 09/03/19 9/1/2019  3:00 AM   Number of days: 1            NG/OG Tube 08/30/19 1000 Right nostril (Active)   Placement Check placement verified by x-ray 9/1/2019 11:15 AM   Tolerance no signs/symptoms of discomfort 9/1/2019 11:15 AM   Securement secured to nostril center w/ adhesive device 9/1/2019 11:15 AM   Clamp Status/Tolerance unclamped  9/1/2019 11:15 AM   Suction Setting/Drainage Method suction at;low;intermittent setting 9/1/2019 11:15 AM   Insertion Site Appearance no redness, warmth, tenderness, skin breakdown, drainage 9/1/2019 11:15 AM   Drainage Bile;Green;Brown 9/1/2019 11:15 AM   Flush/Irrigation flushed w/;water;no resistance met 9/1/2019 11:15 AM   Intake (mL) 60 mL 8/31/2019 12:00 PM   Tube Output(mL)(Include Discarded Residual) 50 mL 9/1/2019  5:00 AM   Number of days: 2       Significant Labs:  CBC:  Recent Labs   Lab 09/01/19  0800   WBC 30.12*   RBC 3.92*   HGB 10.9*   HCT 35.0*   PLT 31*   MCV 89   MCH 27.8   MCHC 31.1*     BMP:  Recent Labs   Lab 09/01/19  0436   *   K 4.2   CL 95   CO2 19*   BUN 16   CREATININE 1.6*   CALCIUM 5.7*      Tacrolimus Levels:  Recent Labs   Lab 09/01/19  0436   TACROLIMUS 1.9*     Microbiology:  Microbiology Results (last 7 days)     Procedure Component Value Units Date/Time    Blood culture [[id number]]  (Abnormal) Collected:  08/30/19 1450    Order Status:  Completed Specimen:  Blood from Peripheral, Hand, Left Updated:  09/01/19 0831     Blood Culture, Routine Gram stain lisa bottle: Gram positive cocci       Results called to and read back by:Torri Murdock RN 08/31/2019  16:23      METHICILLIN RESISTANT STAPHYLOCOCCUS AUREUS  ID consult required at [facility] and [facility].  For susceptibility see order #[id number]      Blood culture [[id number]] Collected:  08/30/19 1445    Order Status:  Completed Specimen:  Blood from Peripheral, Forearm, Right Updated:  08/31/19 1612     Blood Culture, Routine No Growth to date      No Growth to date    Blood culture [[id number]] Collected:  08/28/19 1250    Order Status:  Completed Specimen:  Blood from Peripheral, Hand, Right Updated:  08/31/19 1612     Blood Culture, Routine No Growth to date      No Growth to date      No Growth to date      No Growth to date    Blood culture [[id number]]  (Abnormal)  (Susceptibility)  Collected:  08/28/19 1020    Order Status:  Completed Specimen:  Blood from Peripheral, Wrist, Right Updated:  08/31/19 1043     Blood Culture, Routine Gram stain aer bottle: Gram positive cocci in clusters resembling Staph      Results called to and read back by:Lashanda Rios RN 08/29/2019  17:38      METHICILLIN RESISTANT STAPHYLOCOCCUS AUREUS  ID consult required at [facility].      Blood culture #1 **CANNOT BE ORDERED STAT** [[id number]]  (Abnormal) Collected:  08/26/19 0848    Order Status:  Completed Specimen:  Blood from Line, Port A Cath Updated:  08/29/19 1111     Blood Culture, Routine Gram stain lisa bottle: Gram positive cocci in clusters resembling Staph       Results called to and read back by:Monet Martinez RN 08/27/2019  06:04      Gram stain aer bottle: Gram positive cocci in clusters resembling Staph       08/28/2019  10:29      METHICILLIN RESISTANT STAPHYLOCOCCUS AUREUS  ID consult required at [facility].  For susceptibility see order #[id number]      Blood culture #2 **CANNOT BE ORDERED STAT** [[id number]]  (Abnormal)  (Susceptibility) Collected:  08/26/19 0859    Order Status:  Completed Specimen:  Blood from Wrist, Right Updated:  08/29/19 1110     Blood Culture, Routine Gram stain aer bottle: Gram positive cocci in clusters resembling Staph       Results called to and read back by:Monet Martinez RN 08/27/2019 03:23      Gram stain lisa bottle: Gram positive cocci in clusters resembling Staph       08/27/2019  11:38      METHICILLIN RESISTANT STAPHYLOCOCCUS AUREUS  ID consult required at [facility].      Culture, Respiratory [[id number]]  (Abnormal)  (Susceptibility) Collected:  08/26/19 1441    Order Status:  Completed Specimen:  Respiratory from Bronchial Wash Updated:  08/28/19 1213     Respiratory Culture METHICILLIN RESISTANT STAPHYLOCOCCUS AUREUS  Many        Gram Stain (Respiratory) <10 epithelial cells per low power field.     Gram Stain (Respiratory) Few WBC's     Gram Stain (Respiratory) Many Gram positive cocci    Narrative:       Bronchial Wash    Fungus culture [[id number]]  (Abnormal) Collected:  08/26/19 1441    Order Status:  Completed Specimen:  Respiratory from Bronchial Wash Updated:  08/28/19 1101     Fungus (Mycology) Culture YEAST   Many  Identification pending      Narrative:       Bronchial Wash    AFB Culture & Smear [[id number]] Collected:  08/26/19 1441    Order Status:  Completed Specimen:  Respiratory from Bronchial Wash Updated:  08/27/19 2127     AFB Culture & Smear Culture in progress     AFB CULTURE STAIN No acid fast bacilli seen.    Narrative:       Bronchial Wash    Respiratory Infection Panel, Nasopharyngeal [[id number]] Collected:  08/26/19 1003    Order Status:  Completed Specimen:  Nasopharyngeal Swab Updated:  08/26/19 1344     Respiratory Infection Panel Source NP Swab     Adenovirus Not Detected     Coronavirus 229E Not Detected     Coronavirus HKU1 Not Detected     Coronavirus NL63 Not Detected     Coronavirus OC43 Not Detected     Human Metapneumovirus Not Detected     Human Rhinovirus/Enterovirus Not Detected     Influenza A (subtypes H1, H1-2009,H3) Not Detected     Influenza B Not Detected     Parainfluenza Virus 1 Not Detected     Parainfluenza Virus 2 Not Detected     Parainfluenza Virus 3 Not Detected     Parainfluenza Virus 4 Not Detected     Respiratory Syncytial Virus Not Detected     Bordetella Parapertussis (MM9052) Not Detected     Bordetella pertussis (ptxP) Not Detected     Chlamydia pneumoniae Not Detected     Mycoplasma pneumoniae Not Detected    Narrative:       For all other respiratory sources order [id number] Respiratory  Viral Panel by PCR (RVPCR)    Culture, Respiratory  - Cystic Fibrosis [[id number]]     Order Status:  Canceled Specimen:  Respiratory           I have reviewed all pertinent labs within the past 24  hours.            Assessment/Plan:     * Septic shock  Multifactorial given staph bacteremia and PNA. History of recurrent  pseudomonal infections and MRSA.    Patient s/p PEA arrest 8/31.  Continue Vancomycin, Micafungin, VFend, and Zerbaxa.  Currently on multiple pressors to maintain MAPs >65. Will wean pressors as tolerated.  Remains on ventilator, chemically paralyzed.  Will continue supportive care.  Prognosis guarded. Will check CT today.    Acute respiratory failure  Reintubated 8/30 evening.  Chemically paralyzed with Nimbex, will start to wean.  Continue supportive care and lung protective ventilation.      S/P lung transplant  Underwent bilateral re-transplant for CARLOS EDUARDO on 6/18/19. Continue immunosuppression and prophylaxis.     Immunosuppression  Continue prednisone.Holding tacrolimus in setting of LISBETH. Daily tacrolimus levels.     Prophylactic antibiotic  Continue voriconazole. 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    Chronic pain with opiate use  Continue fentanyl infusion and versed infusions.    CF related Pancreatic insufficiency  NPO while chemically paralyzed    LISBETH (acute kidney injury)  Nephrology following appreciate recs.     Bacteremia  Blood cultures from 8/26 with MRSA. Right chest port removed. Repeat blood cultures NGTD. ID following, appreciate recs. Continue vancomycin, Zerbaxa, and Micafungin.     Arterial occlusion, lower extremity  Right leg with areas of ecchymosis and redness.  Vascular surgery consulted.        Preventive Measures: Nutrition: Goal: NPO while paralyzed, DVT: continue prophyllaxis, Head of Bet: elevated    Counseling/Consultation:I discussed the case with Dr. Alvarez.    Reyes Will NP  Lung Transplant  [facility]

## 2019-09-01 NOTE — ASSESSMENT & PLAN NOTE
30yo woman w/a history of CF (c/b pancreatic insufficiency, sinus disease, MRSA/Pseudomonas colonization c/b numerous antibiotics allergies, and subsequent COPD/ESLD; s/p BOLT 6/12/2014, CMV D+/R-, simulect induction, on maintenance tacro/MMF/pred; c/b multiple episodes of MRSA/Pseudomonas pneumonia/sinusitis, C.glabrata early post-operative colonization 7/2014, A1 rejection 8/2014 s/p pulse SM, prior CMV reactivations, and subsequent grade 3 CARLOS EDUARDO; s/p redo BOLT 6/18/2019, CMV D-/R+, basiliximab induction, on maintenance tacro/MMF/pred; c/b  Pseudomonas and C.glabrata recipient derived post-transplant pneumonia s/p zerbaxa/micafungin course and invasive aspergillosis on chronic voriconazole) who was admitted on 8/26/2019 with 2 days of fevers, worsening SOB, productive cough, and hypoxic respiratory failure/septic shock due to MRSA pneumonia with associated septicemia (requiring pressors, intubation, and CRRT; associated with RIJ thrombus and port, s/p removal). She suffered a profound decompensation on 8/30 with shock requiring max doses of multiple pressors, reintubation with paralysis/pronation, possible anemia of blood loss, and acidosis culminating in PEA arrest requiring 1 round of CPR despite empiric broadening of coverage to include vanc, zerbaxa, tobramycin, vori/micafungin -- ultimately found to have large RA, SVC, and RIJ thrombus as precipitant for hemodynamic instability on imaging. She remains critically ill.    - would continue vancomycin (goal trough 15-20) for MRSA septicemia and pneumonia  - would continue empiric zerbaxa/tobra for now -- will likely taper off if no new pathogens from new repeat cx  - would continue voriconazole for prior invasive aspergillosis; await BAL antigen from recent bronch; repeat level  - may continue micafungin while awaiting yeast ID   - remainder of prophylaxis per protocol  - care of diffuse RA/SVC thrombus deferred to lung transplant service  - will repeat cultures  to assess for clearance in morning  - contact precautions for MRSA

## 2019-09-01 NOTE — ASSESSMENT & PLAN NOTE
Reintubated 8/30 evening.  Chemically paralyzed with Nimbex, will start to wean.  Continue supportive care and lung protective ventilation.

## 2019-09-01 NOTE — ASSESSMENT & PLAN NOTE
Ms. 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 Her baseline SCr in the past year has been around ~ 1.0. LISBETH likely secondary to ATN caused by hypotension/sepsis in the setting of severe metabolic acidosis.    Recommendations:    - Strict I/O and chart  - Avoid nephrotoxic medications, NSAIDs, IV contrast, etc.  - Medication doses adjusted to GFR  - Maintain MAP > 65  - Patient's SaO2 dropped to 70s ~2 AM and coded, received 1 round of CPR.   - Had been receiving SLED 8/29-8/30 until about 4 pm on 8/30. Attempted restart SLED 8/31 prior to code, and after code, but both attempts unsuccessful due to clotting of femoral catheter.   - Patient unable to receive RRT 8/30 due to cath clotting, as well as tachycardia with HR in 170s and hypotension requiring multiple pressors. SLED resumed yesterday afternoon. Patient is net positive 8L past 24 hours. Will plan on continuing SLED today

## 2019-09-01 NOTE — PROGRESS NOTES
Dr. Alvarez notified of 2200 ABG results, per MD decrease PEEP to 12, will repeat ABG @ midnight. Also critical calcium level of 5.9, will give 1 g calcium.

## 2019-09-01 NOTE — PLAN OF CARE
Problem: Adult Inpatient Plan of Care  Goal: Plan of Care Review  Outcome: Ongoing (interventions implemented as appropriate)  No acute events during shift. HR 120s-130s, extremities mottled, upper extremities dopplarable, lower extremities not dopplarable, Dr. Alvarez aware, vascular consulted. O2 >95% on vent, AC VC, 70%, 5 PEEP. Gtts: Angio @ 40 ng/kg/m, Levo @ 0.58 mcg/kg/m, Vaso @ 0.04 u/m, Fentanyl @ 175 mcg/h, Versed @ 2 mg/h. Paralytic d/c. Labs trended: Ca replaced. CRRT: SLED, UF @ 200, heparin @ 300 u/h prefilter. CT scan of chest, abdomen, pelvis performed; pt tolerated well. See flowsheets for intake and output, vital signs, and assessments. POC reviewed with family. Will continue to monitor.

## 2019-09-01 NOTE — HPI
Ms. Ibarra is a 30 yo female s/p bilateral re-transplant (6/18/19, initial in 2014) who presented in respiratory distress on 8/26 which acutely worsened in the ED requiring ICU admission and intubation upon presentation.  Since then her course has been complicated by persistent respiratory failure requiring intermittent proning, renal failure requiring CRRT and cultures positive for staph bacteremia (blood cultures positive for staph aureus on 8/26). She continues to have blood cultures positive for MRSA (last 8/30) for which ID is following and she is on IV antibitotics. She is also now s/p PEA arrest in the early morning hours of 8/31. In the hours surrounding her code event she was found to have a cold, pulseless right lower extremity in setting of femoral A-line, which has since been removed. Her shock is being treated with norepinephrine, angiotensin-II, vasopressin, and neosynephrine. She remains on high doses on all vasopressor medication though they are titrating down from levels immediately following code. Vascular surgery was consulted given pulseless right lower extremity.

## 2019-09-01 NOTE — PROGRESS NOTES
[facility]  Endocrinology  Progress Note    Admit Date: 8/26/2019     Reason for Consult: Management of Steroid induced Hyperglycemia     Surgical Procedure and Date:   Bilateral re-transplant of Lung    Diabetes diagnosis year: No dx of dm. Steroid induced hyperglycemia secondary to lung transplant treatment.     Home Diabetes Medications:    Januvia 100 mg daily   Novolog 4 units TIDWM    How often checking glucose at home? Not checking  BG readings on regimen: Unknown  Hypoglycemia on the regimen?  No  Missed doses on regimen?  Yes    Diabetes Complications include:     Hyperglycemia    Complicating diabetes co morbidities:   Glucocorticoid use       HPI:   Patient is a 29 y.o. female with a diagnosis of   MsAditi Ibarra is a 28 yo female s/p bilateral re-transplant (6/18/19) who presented to the ED in acute respiratory distress 08/26/2019. Per patient's caregiver, patient was doing well since her hospital discharge, but reports fevers (tmax 101.5) x 2 days, cough with occasional thin sputum, and severe dyspnea requiring >5L oxygen over the weekend. Patient's caregiver also reports poor appetite and PO intake over the last week. Patient has previous history of steroid sensitivity during admission in regards to glycemic control. Endocrinology consulted to manage hyperglycemia during admssion.     Lab Results   Component Value Date    HGBA1C 4.7 01/09/2019       Interval HPI:   Overnight events: Remains in SICU. BG labile overnight, receiving prn SQ insulin correction scale. Hydrocortisone 50 mg q 6 hrs per primary team. CRRT in place.   Eating:   NPO  Nausea: No  Hypoglycemia and intervention: No  Fever: No  TPN and/or TF: No  If yes, type of TF/TPN and rate: none    BP (!) 86/54   Pulse (!) 135   Temp 98.6 °F (37 °C) (Core Esophageal)   Resp (!) 40   Ht 5' (1.524 m)   Wt 70 kg (154 lb 5.2 oz)   LMP 10/02/2016 (LMP Unknown)   SpO2 97%   Breastfeeding? No   BMI 30.14 kg/m²      Labs  Reviewed and Include    Recent Labs   Lab 09/01/19  0436  09/01/19  0800   *  --   --    CALCIUM 5.7*  --   --    ALBUMIN 1.2*  1.2*   < > 1.2*   PROT 3.7*   < > 3.8*   *  --   --    K 4.2  --   --    CO2 19*  --   --    CL 95  --   --    BUN 16  --   --    CREATININE 1.6*  --   --    ALKPHOS 216*   < > 246*   *   < > 423*   AST 1,340*   < > 1,373*   BILITOT 0.8   < > 0.7    < > = values in this interval not displayed.     Lab Results   Component Value Date    WBC 30.12 (H) 09/01/2019    HGB 10.9 (L) 09/01/2019    HCT 35.0 (L) 09/01/2019    MCV 89 09/01/2019    PLT 31 (LL) 09/01/2019     Recent Labs   Lab 08/26/19  0847   TSH 1.120     Lab Results   Component Value Date    HGBA1C 4.7 01/09/2019       Nutritional status:   Body mass index is 30.14 kg/m².  Lab Results   Component Value Date    ALBUMIN 1.2 (L) 09/01/2019    ALBUMIN 1.1 (L) 09/01/2019    ALBUMIN 1.2 (L) 09/01/2019    ALBUMIN 1.2 (L) 09/01/2019     Lab Results   Component Value Date    PREALBUMIN 15 (L) 05/29/2014    PREALBUMIN 11 (L) 05/09/2014    PREALBUMIN 18 (L) 03/19/2014       Estimated Creatinine Clearance: 44.9 mL/min (A) (based on SCr of 1.6 mg/dL (H)).    Accu-Checks  Recent Labs     08/30/19  0402 08/30/19  0826 08/30/19  1221 08/30/19  1625 08/30/19  1752 08/31/19  0023 08/31/19  1203 08/31/19  1757 09/01/19  0006 09/01/19  0606   POCTGLUCOSE 128* 192* 117* 111* 91 133* 304* 312* 267* 191*       Current Medications and/or Treatments Impacting Glycemic Control  Immunotherapy:    Immunosuppressants     None        Steroids:   Hormones (From admission, onward)    Start     Stop Route Frequency Ordered    09/01/19 0000  hydrocortisone sodium succinate injection 50 mg      -- IV Every 6 hours 08/31/19 2103    08/31/19 1930  angiotensin II (GIAPREZA) 2,500,000 ng in sodium chloride 0.9% 250 mL infusion     Question Answer Comment   Titrate by: (in mcg/kg/min) 10    Titrate interval: (in minutes) 5    Titrate to maintain: (MAP  or SBP) MAP    Greater than: (in mmHg) 65    Maximum dose: (in mcg/kg/min) 40    Name of [facility] Attending Provider giving approval: [place]    Angiotensin II Infusion Adjustment (DO NOT MODIFY ANSWER) [email or web address]        -- IV Continuous 08/31/19 1820    08/26/19 1031  vasopressin (PITRESSIN) 0.2 Units/mL in dextrose 5 % 100 mL infusion      -- IV Continuous 08/26/19 1031        Pressors:    Autonomic Drugs (From admission, onward)    Start     Stop Route Frequency Ordered    08/30/19 2045  norepinephrine 16 mg in dextrose 5 % 250 mL infusion     Question Answer Comment   Titrate by: (in mcg/kg/min) 0.05    Titrate interval: (in minutes) 5    Titrate to maintain: (MAP or SBP) MAP    Greater than: (in mmHg) 65    Maximum dose: (in mcg/kg/min) 3        -- IV Continuous 08/30/19 2040 08/30/19 1854  cisatracurium (NIMBEX) 200 mg in dextrose 5 % 100 mL infusion     Question Answer Comment   Bolus over 1 minute (in mg): 0    Begin at (in mcg/kg/min): 1    Titrate by: (in mcg/kg/min) 0.5    Titrate interval: (in minutes) 15    To maintain a train-of-four of (TOF_/4): 2/4    Maximum dose: (in mcg/kg/min) 10        -- IV Continuous 08/30/19 1854        Hyperglycemia/Diabetes Medications:   Antihyperglycemics (From admission, onward)    Start     Stop Route Frequency Ordered    08/30/19 1721  insulin aspart U-100 pen 0-5 Units      -- SubQ Every 6 hours PRN 08/30/19 1621    08/26/19 1515  insulin aspart U-100 (NovoLOG) 100 unit/mL (3 mL) pen     Note to Pharmacy:  Created by cabinet override    08/27 0329   08/26/19 1515          ASSESSMENT and PLAN    * Septic shock  Infection may elevate BG readings  Managed per primary team  Avoid hypoglycemia        Acute hyperglycemia  BG goal 140 - 180     Continue low dose correction scale prn BG excursions  BG monitoring q 4 hrs while NPO    ** Please call Endocrine for any BG related issues **    Discharge planning: TBD        LISBETH  (acute kidney injury)  Caution with insulin stacking            Argentina Stevens NP  Endocrinology  [facility]

## 2019-09-01 NOTE — PLAN OF CARE
Problem: Adult Inpatient Plan of Care  Goal: Plan of Care Review  POC reviewed with pt, spose and mother at bedside, on Vent 90% and PEEP 12, rate 40, ABG q2h, xray obtained this am, sats 90-99%. Sedated on Versed gtt, Fentanyl gtt for pain, Nimbex titrated for TOF 4/4, BiS 35-60. Remains on Levo @ 0.56 mcg/kg/min, Tushar @ 4.5 mcg/kg/min, Angiotensin II @ 40 ng/kg/min and Vaso @ 0.04 units/min to maintain MAP>60. On CRRT with UF @ 200, tolerated well, did not clot overnight. Pt with 3+ edema, scleral edema, blotchy and purple discoloration on abdomen, hands, feet and increasing on bilateral knees, heat packs applied and changed q2h, areas marked and Dr. Alvarez aware. Heel and elbow protectors in place. Am lab results reviewed, Dr. Alvarez notified, no new orders at this time. All questions answered and addressed.

## 2019-09-01 NOTE — PROCEDURES
Juanita Ibarra is a 29 y.o. female patient.    Temp: 99.4 °F (37.4 °C) (09/01/19 1200)  Pulse: (!) 137 (09/01/19 1300)  Resp: (!) 40 (08/31/19 2321)  BP: (!) 88/53 (09/01/19 1200)  SpO2: (!) 93 % (09/01/19 1300)  Weight: 70 kg (154 lb 5.2 oz) (09/01/19 0400)  Height: 5' (152.4 cm) (08/27/19 0930)       Debridement  Date/Time: 9/1/2019 1:51 PM  Performed by: Angelica Gomez MD  Authorized by: Angelica Gomez MD       Wound Details:    Location:  Chest    Type of Debridement:  Excisional       Length (cm):  15       Area (sq cm):  45       Width (cm):  3       Percent Debrided (%):  1       Depth (cm):  2       Total Area Debrided (sq cm):  0.45    Depth of debridement:  Subcutaneous tissue    Tissue debrided:  Other    Devitalized tissue debrided:  Exudate and Fibrin    Instruments:  Blade    Bleeding:  Minimal  Hemostasis Achieved: Yes    Method Used:  Pressure  Patient tolerance:  Patient tolerated the procedure well with no immediate complications     Existing negative pressure wound therapy dressing removed. Wound bed with some grannulation tissue and moderate fibrinous exudate on the medial aspect of the wound bed. There was no tracking. Fibrinous exudate was debrided by curretage and sharp excision to healthy punctate bleeding tissue. Black negative pressure sponge applied. Good suction and seal achieved at 125mmHg low continuous suction. Patient tolerated the procedure well without hemodynamic changes.        Angelica Gomez  9/1/2019

## 2019-09-01 NOTE — SUBJECTIVE & OBJECTIVE
Interval History: Patient evaluated bedside, intubated and proned. Patient unable to receive RRT 8/30 due to cath clotting, as well as tachycardia with HR in 170s and hypotension requiring multiple pressors. SLED resumed yesterday afternoon. Patient is net positive 8L past 24 hours.     Review of patient's allergies indicates:   Allergen Reactions    Albuterol Palpitations    Colistin Anaphylaxis    Vancomycin analogues      Infusion reaction that does not resolve with slowing  Pt. States she can tolerate it when given with 50 mg Benadryl and ran over 3 hours    Neupogen [filgrastim] Other (See Comments)     Ostealgia after five daily doses of 300 mcg.      Bactrim [sulfamethoxazole-trimethoprim] Hives    Ceftazidime Hives     Pt stated can tolerate cefapine not ceftazidime    Ceftazidime     Dronabinol Other (See Comments)     Mental changes/hallucinations    Haldol [haloperidol lactate] Other (See Comments)     Seizure like activity    Nsaids (non-steroidal anti-inflammatory drug)      Cannot have due to lung transplant    Adhesive Rash     Cloth tape- please use tegaderm or paper tape    Aztreonam Rash    Ciprofloxacin Nausea And Vomiting     Projectile N/V, per patient.  Unwilling to retry therapy.     Current Facility-Administered Medications   Medication Frequency    0.9%  NaCl infusion (CRRT USE ONLY) Continuous    0.9%  NaCl infusion (for blood administration) Q24H PRN    0.9%  NaCl infusion (for blood administration) Q24H PRN    angiotensin II (GIAPREZA) 2,500,000 ng in sodium chloride 0.9% 250 mL infusion Continuous    carboxymethylcellulose sodium 1 % DpGe TID    ceftolozane-tazobactam (ZERBAXA) 1,500 mg in dextrose 5 % 100 mL Q8H    chlorhexidine 0.12 % solution 15 mL BID    cisatracurium (NIMBEX) 200 mg in dextrose 5 % 100 mL infusion Continuous    dexmedetomidine (PRECEDEX) 400mcg/100mL 0.9% NaCL infusion Continuous    dextrose 50% injection 12.5 g PRN    diphenhydrAMINE 12.5  mg/5 mL elixir 25 mg Daily PRN    diphenhydrAMINE injection 50 mg Q6H PRN    fentaNYL 2500 mcg in 0.9% sodium chloride 250 mL infusion premix (titrating) Continuous    glucagon (human recombinant) injection 1 mg PRN    heparin (porcine) injection 5,000 Units Q12H    heparin 25,000 units in dextrose 5% 250 mL (100 units/mL) infusion (heparin infusion) Continuous    hydrocortisone sodium succinate injection 50 mg Q6H    HYDROmorphone injection 1 mg Q6H PRN    insulin aspart U-100 pen 0-5 Units Q6H PRN    levalbuterol nebulizer solution 1.25 mg Q8H    LORazepam tablet 1 mg Q8H PRN    magnesium sulfate 2g in water 50mL IVPB (premix) PRN    metoprolol injection 10 mg Q5 Min PRN    micafungin 100 mg in sodium chloride 0.9 % 100 mL IVPB (ready to mix system) Q24H    midazolam (VERSED) 1 mg/mL in dextrose 5 % 100 mL infusion (non-titrating) Continuous    norepinephrine 16 mg in dextrose 5 % 250 mL infusion Continuous    ondansetron injection 8 mg Q6H PRN    oxyCODONE immediate release tablet Tab 10 mg Q8H PRN    PHENYLephrine ([place]-SYNEPHRINE) 20 mg in sodium chloride 0.9% 250ml (titrating) Continuous    promethazine (PHENERGAN) 12.5 mg in dextrose 5 % 50 mL IVPB Q6H PRN    sodium bicarbonate 1 mEq/mL (8.4 %) 150 mEq in dextrose 5 % 1,000 mL infusion Continuous    sodium chloride 0.9% bolus 1,000 mL Once    sodium chloride 0.9% bolus 500 mL Once    sodium phosphate 20.01 mmol in dextrose 5 % 250 mL IVPB PRN    sodium phosphate 30 mmol in dextrose 5 % 250 mL IVPB PRN    sodium phosphate 39.99 mmol in dextrose 5 % 250 mL IVPB PRN    vasopressin (PITRESSIN) 0.2 Units/mL in dextrose 5 % 100 mL infusion Continuous    voriconazole (VFEND) 200 mg in dextrose 5 % 100 mL IVPB Q12H       Objective:     Vital Signs (Most Recent):  Temp: 98.8 °F (37.1 °C) (09/01/19 0800)  Pulse: (!) 135 (09/01/19 0807)  Resp: (!) 40 (08/31/19 2321)  BP: (!) 83/51 (09/01/19 0800)  SpO2: 97 % (09/01/19 0807)  O2 Device (Oxygen  Therapy): ventilator (09/01/19 0807) Vital Signs (24h Range):  Temp:  [97.7 °F (36.5 °C)-103.5 °F (39.7 °C)] 98.8 °F (37.1 °C)  Pulse:  [127-173] 135  Resp:  [35-40] 40  SpO2:  [56 %-100 %] 97 %  BP: ()/(50-67) 83/51  Arterial Line BP: ()/(43-56) 101/45     Weight: 70 kg (154 lb 5.2 oz) (09/01/19 0400)  Body mass index is 30.14 kg/m².  Body surface area is 1.72 meters squared.    I/O last 3 completed shifts:  In: 19993.3 [I.V.:22164.4; Blood:1983.3; Other:361.6; NG/GT:180]  Out: 3383 [Drains:50; Other:3333]    Physical Exam   Constitutional: She appears well-developed. She is sedated and intubated.   on mechanical ventilation   HENT:   Head: Normocephalic and atraumatic.   Eyes: Right eye exhibits no discharge. Left eye exhibits no discharge. No scleral icterus.   Neck: Neck supple.   Cardiovascular: Normal rate and regular rhythm.   Pulmonary/Chest: She is intubated. No respiratory distress. She has no wheezes. She has rales.   Transmitted ventilator sounds   Abdominal: Soft. Bowel sounds are normal.   Musculoskeletal: She exhibits edema. She exhibits no tenderness or deformity.   Neurological: She is alert.   Skin: Skin is warm. There is pallor.   Nursing note and vitals reviewed.      Significant Labs:  CBC:   Recent Labs   Lab 09/01/19  0436  09/01/19  0800   WBC 26.91*   < > 30.12*   RBC 3.83*   < > 3.92*   HGB 11.0*   < > 10.9*   HCT 33.6*   < > 35.0*   PLT 30*  --   --    MCV 88   < > 89   MCH 28.7   < > 27.8   MCHC 32.7   < > 31.1*    < > = values in this interval not displayed.     CMP:   Recent Labs   Lab 09/01/19  0436 09/01/19  0600   *  --    CALCIUM 5.7*  --    ALBUMIN 1.2*  1.2* 1.1*   PROT 3.7* 3.6*   *  --    K 4.2  --    CO2 19*  --    CL 95  --    BUN 16  --    CREATININE 1.6*  --    ALKPHOS 216* 217*   * 399*   AST 1,340* 1,337*   BILITOT 0.8 0.7     All labs within the past 24 hours have been reviewed.     Significant Imaging:  CXR personally reviewed.   no

## 2019-09-01 NOTE — ASSESSMENT & PLAN NOTE
BG goal 140 - 180     Continue low dose correction scale prn BG excursions  BG monitoring q 4 hrs while NPO    ** Please call Endocrine for any BG related issues **    Discharge planning: TBD

## 2019-09-01 NOTE — PROGRESS NOTES
Dr. Alvarez at bedside to assess pt, family at bedside updated on plan for tonight, will continue monitoring, titrating pressors, and continuing CRRT. All questions answered and addressed.

## 2019-09-01 NOTE — ASSESSMENT & PLAN NOTE
Multifactorial given staph bacteremia and PNA. History of recurrent  pseudomonal infections and MRSA.    Patient s/p PEA arrest 8/31.  Continue Vancomycin, Micafungin, VFend, and Zerbaxa.  Currently on multiple pressors to maintain MAPs >65. Will wean pressors as tolerated.  Remains on ventilator, chemically paralyzed.  Will continue supportive care.  Prognosis guarded. Will check CT today.

## 2019-09-01 NOTE — SUBJECTIVE & OBJECTIVE
Subjective:     Interval History: No new events overnight.  Condition remains guarded    Continuous Infusions:   sodium chloride 0.9% 200 mL/hr at 09/01/19 0900    angiotensin II 40 ng/kg/min (08/31/19 2000)    cisatracurium (NIMBEX) infusion 1.5 mcg/kg/min (09/01/19 1200)    dexmedetomidine (PRECEDEX) infusion Stopped (08/30/19 1800)    fentanyl 175 mcg/hr (09/01/19 1200)    heparin (porcine) in 5 % dex 300 Units/hr (09/01/19 1200)    midazolam (VERSED) infusion (non-titrating) 3 mg/hr (09/01/19 1200)    norepinephrine bitartrate-D5W 0.69 mcg/kg/min (09/01/19 1200)    phenylephrine Stopped (09/01/19 1000)    sodium bicarbonate drip Stopped (08/31/19 2100)    vasopressin (PITRESSIN) infusion 0.04 Units/min (09/01/19 1200)     Scheduled Meds:   carboxymethylcellulose sodium   Both Eyes TID    ceftolozane-tazobactam  1,500 mg Intravenous Q8H    chlorhexidine  15 mL Mouth/Throat BID    heparin (porcine)  5,000 Units Subcutaneous Q12H    hydrocortisone sodium succinate  50 mg Intravenous Q6H    levalbuterol  1.25 mg Nebulization Q8H    micafungin (MYCAMINE) IVPB  100 mg Intravenous Q24H    sodium chloride 0.9%  1,000 mL Intravenous Once    sodium chloride 0.9%  500 mL Intravenous Once    vorconazole (VFEND) IVPB  4 mg/kg Intravenous Q12H     PRN Meds:sodium chloride, sodium chloride, dextrose 50%, diphenhydrAMINE, diphenhydrAMINE, glucagon (human recombinant), HYDROmorphone, insulin aspart U-100, LORazepam, magnesium sulfate IVPB, metoprolol, ondansetron, oxyCODONE, promethazine (PHENERGAN) IVPB, sodium phosphate IVPB, sodium phosphate IVPB, sodium phosphate IVPB    Review of patient's allergies indicates:   Allergen Reactions    Albuterol Palpitations    Colistin Anaphylaxis    Vancomycin analogues      Infusion reaction that does not resolve with slowing  Pt. States she can tolerate it when given with 50 mg Benadryl and ran over 3 hours    Neupogen [filgrastim] Other (See Comments)      Ostealgia after five daily doses of 300 mcg.      Bactrim [sulfamethoxazole-trimethoprim] Hives    Ceftazidime Hives     Pt stated can tolerate cefapine not ceftazidime    Ceftazidime     Dronabinol Other (See Comments)     Mental changes/hallucinations    Haldol [haloperidol lactate] Other (See Comments)     Seizure like activity    Nsaids (non-steroidal anti-inflammatory drug)      Cannot have due to lung transplant    Adhesive Rash     Cloth tape- please use tegaderm or paper tape    Aztreonam Rash    Ciprofloxacin Nausea And Vomiting     Projectile N/V, per patient.  Unwilling to retry therapy.       Review of Systems   Unable to perform ROS: Intubated     Objective:   Physical Exam   Constitutional: She appears well-developed and well-nourished. She appears ill. She is sedated, chemically paralyzed and intubated.   HENT:   Head: Normocephalic and atraumatic.   ET and OG tubes in place   Eyes: Right eye exhibits no discharge. Left eye exhibits no discharge. No scleral icterus.   Neck: Neck supple.   Cardiovascular: Normal rate and regular rhythm.   Pulmonary/Chest: She is intubated. No respiratory distress. She has no wheezes. She has rales.   Mechanical ventilator sounds   Abdominal: Soft. Bowel sounds are normal.   Musculoskeletal: She exhibits edema (generalized). She exhibits no tenderness or deformity.   Skin: Skin is warm. There is pallor.   Right leg with mottling noted.   Nursing note and vitals reviewed.        Vital Signs (Most Recent):  Temp: 99.4 °F (37.4 °C) (09/01/19 1200)  Pulse: (!) 137 (09/01/19 1200)  Resp: (!) 40 (08/31/19 2321)  BP: (!) 88/53 (09/01/19 1200)  SpO2: 97 % (09/01/19 1200) Vital Signs (24h Range):  Temp:  [97.7 °F (36.5 °C)-102 °F (38.9 °C)] 99.4 °F (37.4 °C)  Pulse:  [127-160] 137  Resp:  [40] 40  SpO2:  [56 %-100 %] 97 %  BP: ()/(50-62) 88/53  Arterial Line BP: ()/(43-56) 105/48     Weight: 70 kg (154 lb 5.2 oz)  Body mass index is 30.14  kg/m².      Intake/Output Summary (Last 24 hours) at 9/1/2019 1229  Last data filed at 9/1/2019 1200  Gross per 24 hour   Intake 43537.14 ml   Output 3827 ml   Net 8135.14 ml       Ventilator Data:     Vent Mode: A/C  Oxygen Concentration (%):  [59-98] 59  Resp Rate Total:  [35 br/min-40 br/min] 35 br/min  Vt Set:  [280 mL-320 mL] 320 mL  PEEP/CPAP:  [5 cmH20-15 cmH20] 5 cmH20  Mean Airway Pressure:  [14 cjV95-56 cmH20] 14 cmH20    Hemodynamic Parameters:       Lines/Drains:       Hemodialysis Catheter 08/31/19 1330 right femoral (Active)   Site Assessment Clean;Dry;Intact;No redness;No swelling 9/1/2019 11:15 AM   Status Accessed 9/1/2019 11:15 AM   Flows Good 9/1/2019  3:00 AM   Dressing Intervention New dressing 8/31/2019  3:15 PM   Dressing Status Biopatch in place;Clean;Dry;Intact 9/1/2019 11:15 AM   Dressing Change Due 09/07/19 9/1/2019  3:00 AM   Site Condition No complications 9/1/2019 11:15 AM   Dressing Occlusive 9/1/2019  3:00 AM   Daily Line Review Performed 9/1/2019  3:00 AM   Number of days: 0            Percutaneous Central Line Insertion/Assessment - triple lumen  08/26/19 1135 left internal jugular (Active)   Dressing biopatch in place;dressing dry and intact 9/1/2019 11:15 AM   Securement secured w/ sutures 9/1/2019 11:15 AM   Additional Site Signs no erythema;no warmth;no edema;no pain;no palpable cord;no streak formation;no drainage 9/1/2019 11:15 AM   Distal Patency/Care infusing 9/1/2019 11:15 AM   Medial Patency/Care infusing 9/1/2019 11:15 AM   Proximal Patency/Care infusing 9/1/2019 11:15 AM   Waveform normal 8/30/2019  3:15 PM   Line Interventions line leveled/zeroed 9/1/2019 11:15 AM   Dressing Change Due 09/02/19 9/1/2019 11:15 AM   Daily Line Review Performed 9/1/2019 11:15 AM   Number of days: 6            Peripheral IV - Single Lumen 08/26/19 1119 20 G Anterior;Right Forearm (Active)   Site Assessment Clean;Dry;Intact;No redness;No swelling 9/1/2019 11:15 AM   Line Status Infusing  9/1/2019 11:15 AM   Dressing Status Clean;Dry;Intact 9/1/2019 11:15 AM   Dressing Intervention Dressing reinforced 8/28/2019  3:00 PM   Dressing Change Due 08/30/19 9/1/2019  3:00 AM   Site Change Due 09/30/19 8/31/2019  7:00 PM   Reason Not Rotated Poor venous access 9/1/2019 11:15 AM   Number of days: 6            Peripheral IV - Single Lumen 08/30/19 1600 20 G Right Forearm (Active)   Site Assessment Clean;Dry;Intact;No redness;No swelling 9/1/2019 11:15 AM   Line Status Infusing 9/1/2019 11:15 AM   Dressing Status Clean;Dry;Intact 9/1/2019 11:15 AM   Dressing Change Due 09/03/19 9/1/2019 11:15 AM   Site Change Due 09/03/19 8/31/2019  7:00 PM   Reason Not Rotated Not due 9/1/2019 11:15 AM   Number of days: 1            Arterial Line 08/31/19 0120 Left Femoral (Active)   Site Assessment Clean;Dry;Intact;No redness;No swelling 9/1/2019 11:15 AM   Line Status Pulsatile blood flow 9/1/2019 11:15 AM   Art Line Waveform Appropriate 9/1/2019 11:15 AM   Arterial Line Interventions Zeroed and calibrated;Leveled 9/1/2019 11:15 AM   Color/Movement/Sensation Cool fingers/toes;Dusky fingers/toes 9/1/2019 11:15 AM   Dressing Type Transparent 9/1/2019 11:15 AM   Dressing Status Clean;Dry;Intact 9/1/2019 11:15 AM   Dressing Intervention New dressing 9/1/2019  3:00 AM   Dressing Change Due 09/03/19 9/1/2019  3:00 AM   Number of days: 1            NG/OG Tube 08/30/19 1000 Right nostril (Active)   Placement Check placement verified by x-ray 9/1/2019 11:15 AM   Tolerance no signs/symptoms of discomfort 9/1/2019 11:15 AM   Securement secured to nostril center w/ adhesive device 9/1/2019 11:15 AM   Clamp Status/Tolerance unclamped 9/1/2019 11:15 AM   Suction Setting/Drainage Method suction at;low;intermittent setting 9/1/2019 11:15 AM   Insertion Site Appearance no redness, warmth, tenderness, skin breakdown, drainage 9/1/2019 11:15 AM   Drainage Bile;Green;Brown 9/1/2019 11:15 AM   Flush/Irrigation flushed w/;water;no resistance met  9/1/2019 11:15 AM   Intake (mL) 60 mL 8/31/2019 12:00 PM   Tube Output(mL)(Include Discarded Residual) 50 mL 9/1/2019  5:00 AM   Number of days: 2       Significant Labs:  CBC:  Recent Labs   Lab 09/01/19  0800   WBC 30.12*   RBC 3.92*   HGB 10.9*   HCT 35.0*   PLT 31*   MCV 89   MCH 27.8   MCHC 31.1*     BMP:  Recent Labs   Lab 09/01/19  0436   *   K 4.2   CL 95   CO2 19*   BUN 16   CREATININE 1.6*   CALCIUM 5.7*      Tacrolimus Levels:  Recent Labs   Lab 09/01/19  0436   TACROLIMUS 1.9*     Microbiology:  Microbiology Results (last 7 days)     Procedure Component Value Units Date/Time    Blood culture [[id number]]  (Abnormal) Collected:  08/30/19 1450    Order Status:  Completed Specimen:  Blood from Peripheral, Hand, Left Updated:  09/01/19 0831     Blood Culture, Routine Gram stain lisa bottle: Gram positive cocci       Results called to and read back by:Torri Murdock RN 08/31/2019  16:23      METHICILLIN RESISTANT STAPHYLOCOCCUS AUREUS  ID consult required at [facility] and [facility].  For susceptibility see order #[id number]      Blood culture [[id number]] Collected:  08/30/19 1445    Order Status:  Completed Specimen:  Blood from Peripheral, Forearm, Right Updated:  08/31/19 1612     Blood Culture, Routine No Growth to date      No Growth to date    Blood culture [[id number]] Collected:  08/28/19 1250    Order Status:  Completed Specimen:  Blood from Peripheral, Hand, Right Updated:  08/31/19 1612     Blood Culture, Routine No Growth to date      No Growth to date      No Growth to date      No Growth to date    Blood culture [[id number]]  (Abnormal)  (Susceptibility) Collected:  08/28/19 1020    Order Status:  Completed Specimen:  Blood from Peripheral, Wrist, Right Updated:  08/31/19 1043     Blood Culture, Routine Gram stain aer bottle: Gram positive cocci in clusters resembling Staph      Results called to and read back by:Lashnada Rios RN 08/29/2019  17:38       METHICILLIN RESISTANT STAPHYLOCOCCUS AUREUS  ID consult required at [facility].      Blood culture #1 **CANNOT BE ORDERED STAT** [[id number]]  (Abnormal) Collected:  08/26/19 0848    Order Status:  Completed Specimen:  Blood from Line, Port A Cath Updated:  08/29/19 1111     Blood Culture, Routine Gram stain lisa bottle: Gram positive cocci in clusters resembling Staph       Results called to and read back by:Monet Martinez RN 08/27/2019  06:04      Gram stain aer bottle: Gram positive cocci in clusters resembling Staph       08/28/2019  10:29      METHICILLIN RESISTANT STAPHYLOCOCCUS AUREUS  ID consult required at [facility].  For susceptibility see order #[id number]      Blood culture #2 **CANNOT BE ORDERED STAT** [[id number]]  (Abnormal)  (Susceptibility) Collected:  08/26/19 0859    Order Status:  Completed Specimen:  Blood from Wrist, Right Updated:  08/29/19 1110     Blood Culture, Routine Gram stain aer bottle: Gram positive cocci in clusters resembling Staph       Results called to and read back by:Monet Martinez RN 08/27/2019 03:23      Gram stain lisa bottle: Gram positive cocci in clusters resembling Staph       08/27/2019  11:38      METHICILLIN RESISTANT STAPHYLOCOCCUS AUREUS  ID consult required at [facility] and [facility].      Culture, Respiratory [[id number]]  (Abnormal)  (Susceptibility) Collected:  08/26/19 1441    Order Status:  Completed Specimen:  Respiratory from Bronchial Wash Updated:  08/28/19 1213     Respiratory Culture METHICILLIN RESISTANT STAPHYLOCOCCUS AUREUS  Many       Gram Stain (Respiratory) <10 epithelial cells per low power field.     Gram Stain (Respiratory) Few WBC's     Gram Stain (Respiratory) Many Gram positive cocci    Narrative:       Bronchial Wash    Fungus culture [[id number]]  (Abnormal) Collected:  08/26/19 1441    Order Status:  Completed Specimen:   Respiratory from Bronchial Wash Updated:  08/28/19 1101     Fungus (Mycology) Culture YEAST   Many  Identification pending      Narrative:       Bronchial Wash    AFB Culture & Smear [[id number]] Collected:  08/26/19 1441    Order Status:  Completed Specimen:  Respiratory from Bronchial Wash Updated:  08/27/19 2127     AFB Culture & Smear Culture in progress     AFB CULTURE STAIN No acid fast bacilli seen.    Narrative:       Bronchial Wash    Respiratory Infection Panel, Nasopharyngeal [[id number]] Collected:  08/26/19 1003    Order Status:  Completed Specimen:  Nasopharyngeal Swab Updated:  08/26/19 1344     Respiratory Infection Panel Source NP Swab     Adenovirus Not Detected     Coronavirus 229E Not Detected     Coronavirus HKU1 Not Detected     Coronavirus NL63 Not Detected     Coronavirus OC43 Not Detected     Human Metapneumovirus Not Detected     Human Rhinovirus/Enterovirus Not Detected     Influenza A (subtypes H1, H1-2009,H3) Not Detected     Influenza B Not Detected     Parainfluenza Virus 1 Not Detected     Parainfluenza Virus 2 Not Detected     Parainfluenza Virus 3 Not Detected     Parainfluenza Virus 4 Not Detected     Respiratory Syncytial Virus Not Detected     Bordetella Parapertussis (KI0868) Not Detected     Bordetella pertussis (ptxP) Not Detected     Chlamydia pneumoniae Not Detected     Mycoplasma pneumoniae Not Detected    Narrative:       For all other respiratory sources order [id number] Respiratory  Viral Panel by PCR (RVPCR)    Culture, Respiratory  - Cystic Fibrosis [[id number]]     Order Status:  Canceled Specimen:  Respiratory           I have reviewed all pertinent labs within the past 24 hours.

## 2019-09-01 NOTE — SUBJECTIVE & OBJECTIVE
Medications Prior to Admission   Medication Sig Dispense Refill Last Dose    blood sugar diagnostic Strp Preferred by insurance. Testing BG 4 times daily. 120 strip 10 8/25/2019    blood-glucose meter Misc Use to check blood glucose 4 (four) times daily. 1 each 1 8/25/2019    calcium-vitamin D3 (OS-KATY 500 + D3) 500 mg(1,250mg) -200 unit per tablet Take 1 tablet by mouth 2 (two) times daily with meals. 60 tablet 11 8/25/2019    dapsone 100 MG Tab Take 1 tablet (100 mg total) by mouth once daily. 30 tablet 11 8/25/2019    diphenhydrAMINE (BENADRYL) 50 MG tablet Take 1 tablet (50 mg total) by mouth every 6 (six) hours as needed for Itching. 1 tablet 0 8/25/2019    fexofenadine (ALLEGRA) 180 MG tablet Take 180 mg by mouth once daily.   8/25/2019    fluticasone propionate (FLONASE) 50 mcg/actuation nasal spray INSERT 2 SPRAYS IN EACH NOSTRIL DAILY 16 g 5 8/25/2019    folic acid (FOLVITE) 1 MG tablet Take 1 tablet (1,000 mcg total) by mouth once daily. 30 tablet 11 8/25/2019    gabapentin (NEURONTIN) 300 MG capsule Take 1 capsule (300 mg total) by mouth 3 (three) times daily. 90 capsule 11 8/25/2019    hydrocortisone 1 % cream Apply topically 2 (two) times daily. 28.35 g 0 8/25/2019    inhalation spacing device (PROCHAMBER) USE AS DIRECTED 1 Device 0 8/25/2019    insulin aspart U-100 (NOVOLOG) 100 unit/mL (3 mL) InPn pen Inject 1-10 Units into the skin before meals and at bedtime as needed (Hyperglycemia). 15 mL 0 8/25/2019    lancets 33 gauge Misc Use to check blood glucose four times daily 120 each 11 8/25/2019    levalbuterol (XOPENEX) 1.25 mg/3 mL nebulizer solution Take 3 mLs (1.25 mg total) by nebulization every 8 (eight) hours as needed for Wheezing or Shortness of Breath. Rescue 90 mL 5 8/25/2019    lipase-protease-amylase (CREON) 36,000-114,000- 180,000 unit CpDR Take 4 capsules by mouth 3 (three) times daily with meals. Take 3 with snacks as needed. (Patient taking differently: Take 5 capsules by  "mouth 3 (three) times daily with meals. ) 800 capsule 11 8/25/2019    LORazepam (ATIVAN) 1 MG tablet Take 1 tablet (1 mg total) by mouth every 8 (eight) hours as needed for Anxiety. 90 tablet 2 8/25/2019    magnesium oxide (MAG-OX) 400 mg (241.3 mg magnesium) tablet Take 1 tablet (400 mg total) by mouth 3 (three) times daily. 90 tablet 11 8/25/2019    metoprolol tartrate (LOPRESSOR) 25 MG tablet Take 1 tablet (25 mg total) by mouth 2 (two) times daily. 60 tablet 11 8/25/2019    montelukast (SINGULAIR) 10 mg tablet Take 1 tablet (10 mg total) by mouth once daily. 30 tablet 11 8/25/2019    morphine (MS CONTIN) 15 MG 12 hr tablet Take 1 tablet by mouth twice daily 60 tablet 0 8/25/2019    multivit,min52-folic-vitK-cQ10 (AQUADEKS) 100-700-10 mcg-mcg-mg Cap cap 1 tab twice daily 60 capsule 0 8/25/2019    omeprazole (PRILOSEC) 40 MG capsule Take 1 capsule (40 mg total) by mouth once daily. 30 capsule 11 8/25/2019    ondansetron (ZOFRAN) 8 MG tablet Take 1 tablet (8 mg total) by mouth every 12 (twelve) hours as needed for Nausea. 30 tablet 11 8/25/2019    oxyCODONE (ROXICODONE) 5 MG immediate release tablet Take 1 tablet by mouth four times a day 120 tablet 0 8/25/2019    pen needle, diabetic 32 gauge x 5/32" Ndle Injecting 3 times a day 100 each 1 8/25/2019    polyethylene glycol (GLYCOLAX) 17 gram PwPk Take 17 g by mouth 2 (two) times daily. 60 packet 11 8/25/2019    predniSONE (DELTASONE) 5 MG tablet Take 3 tablets (15 mg total) by mouth once daily. 90 tablet 11 8/25/2019    QUEtiapine (SEROQUEL) 25 MG Tab Take 1 tablet by mouth in morning and 2 tablets at bedtime 90 tablet 2 8/25/2019    SITagliptin (JANUVIA) 100 MG Tab Take 1 tablet (100 mg total) by mouth once daily. 30 tablet 11 8/25/2019    tacrolimus (PROGRAF) 0.5 MG Cap Take 1 capsule (0.5 mg total) by mouth every morning. Total daily dose is 1.5 mg every morning and 1 mg every evening. 30 capsule 11 8/25/2019    tacrolimus (PROGRAF) 1 MG Cap Take " 1 capsule (1 mg total) by mouth every 12 (twelve) hours. 60 capsule 11 8/25/2019    tiZANidine (ZANAFLEX) 4 MG tablet Take 1 tablet (4 mg total) by mouth 2 (two) times daily. 60 tablet 1 8/25/2019    topiramate (TOPAMAX) 25 MG tablet Take 1 tablet (25 mg total) by mouth 2 (two) times daily 60 tablet 1 8/25/2019    valGANciclovir (VALCYTE) 450 mg Tab Take 1 tablet (450 mg total) by mouth 2 (two) times daily. 60 tablet 11 8/25/2019    venlafaxine (EFFEXOR-XR) 150 MG Cp24 Take 1 capsule (150 mg total) by mouth once daily. 30 capsule 2 8/25/2019    venlafaxine (EFFEXOR-XR) 37.5 MG 24 hr capsule Take 1 capsule (37.5 mg total) by mouth once daily. 30 capsule 2 8/25/2019    voriconazole (VFEND) 200 MG Tab Take 1 tablet (200 mg total) by mouth 2 (two) times daily. 60 tablet 5 8/25/2019       Review of patient's allergies indicates:   Allergen Reactions    Albuterol Palpitations    Colistin Anaphylaxis    Vancomycin analogues      Infusion reaction that does not resolve with slowing  Pt. States she can tolerate it when given with 50 mg Benadryl and ran over 3 hours    Neupogen [filgrastim] Other (See Comments)     Ostealgia after five daily doses of 300 mcg.      Bactrim [sulfamethoxazole-trimethoprim] Hives    Ceftazidime Hives     Pt stated can tolerate cefapine not ceftazidime    Ceftazidime     Dronabinol Other (See Comments)     Mental changes/hallucinations    Haldol [haloperidol lactate] Other (See Comments)     Seizure like activity    Nsaids (non-steroidal anti-inflammatory drug)      Cannot have due to lung transplant    Adhesive Rash     Cloth tape- please use tegaderm or paper tape    Aztreonam Rash    Ciprofloxacin Nausea And Vomiting     Projectile N/V, per patient.  Unwilling to retry therapy.       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     Past Surgical History:   Procedure Laterality Date    ABDOMINAL SURGERY      peg tube     [id number] N/A 5/19/2015    Performed by Deny Dias Jr[facility] at [facility] OR    BIOPSY-BRONCHUS N/A 12/20/2016    Performed by Jake Alvarez MD at [facility] OR 2ND FLR    Bronchoscopy N/A 7/5/2019    Performed by Francisca Morin DO at [facility] OR 2ND FLR    BRONCHOSCOPY Bilateral 12/11/2018    Performed by Francisca Morin DO at [facility] OR 2ND FLR    BRONCHOSCOPY N/A 10/1/2018    Performed by Jake Alvarez MD at [facility] OR 2ND FLR    BRONCHOSCOPY Bilateral 12/20/2016    Performed by Jake Alvarez MD at [facility] OR 2ND FLR    BRONCHOSCOPY - flexible bronchoscopy with probable tissue biopsy CPT 36516 N/A 7/21/2015    Performed by Jake Alvarez MD at [facility] OR 2ND FLR    BRONCHOSCOPY - flexible bronchoscopy with probable tissue biospy CPT 52797 N/A 10/20/2015    Performed by Jake Alvarez MD at [facility] OR 2ND FLR    BRONCHOSCOPY - flexible bronchoscopy with tissue biopsy CPT 15944 N/A 9/29/2015    Performed by Jake Alvarez MD at [facility] OR 2ND FLR    BRONCHOSCOPY - flexible bronchoscopy with tissue biopsy CPT 42926 N/A 5/26/2015    Performed by Jake Alvarez MD at [facility] OR 1ST FLR    BRONCHOSCOPY flexible bronchoscopy with tissue biopsy N/A 9/8/2014    Performed by Jake Alvarez MD at [facility] OR 2ND FLR    BRONCHOSCOPY flexible with possible tissue biopsy N/A 8/8/2014    Performed by Jake Alvarez MD at [facility] OR 2ND FLR    BRONCHOSCOPY, BAL, BIOPSIES N/A 3/20/2018    Performed by Jake Alvarez MD at [facility] OR 2ND FLR    BRONCHOSCOPY-FIBEROPTIC N/A 1/29/2016    Performed by Joann Cifuentes DO at [facility] OR 2ND  FLR    BRONCHOSCOPY; Bronchoscopy with BAL and transbronchial biopsies under general anesthesia N/A 5/29/2018    [facility]    CHOLECYSTECTOMY  2016    CHOLECYSTECTOMY-LAPAROSCOPIC N/A 7/22/2016    [facility]    COLONOSCOPY N/A 5/3/2019    Performed by Juancho Rich MD at [facility] ENDO (2ND FLR)    ENDOMETRIAL ABLATION  2015    [facility]    ETHMOIDECTOMY Bilateral 4/9/2019    Performed by Adin Burks III, MD at [facility] OR 2ND FLR    EXPLORATION, WOUND - right chest N/A 8/28/2019    [facility]    FESS      FESS Bilateral 10/21/2013    [facility]    FESS, USING COMPUTER-ASSISTED NAVIGATION N/A 4/9/2019    Performed by Adin Burks III, MD at [facility] OR [address] [place] [address]    FINE NEEDLE ASPIRATION (FNA)  of a cervical lymphadenopathy  1/29/2016    Performed by Joann Cifuentes DO at [facility] OR 2ND FLR    flex bronchoscopy with probable tissue biopsy N/A 7/31/2014    Performed by [facility] Diagnostic Provider at [facility]    flexible bronchoscopy with tissue biopsy N/A 12/11/2014    [facility]    flexible bronchoscopy with tissue biopsy  CPT 28778 N/A 8/15/2019    [facility]    flexible bronchoscopy with tissue biopsy CPT 82171  N/A 7/26/2019    [facility]    HYSTERECTOMY  04/2017    [facility]    INJECTION-VOCAL CORD Left 6/24/2014    Performed by Jared Whatley MD at [facility] OR [address] [place] [address]    DIIJHKOMZ-BGYX-N-CATH to right chest and removal of portacath to left chests wall. Bilateral 6/24/2014    Performed [facility]hen [facility]ado [facility]    LARYNX SURGERY  2016    LUNG TRANSPLANT Bilateral 6/12/14    MAXILLARY ANTROSTOMY  4/9/2019    Performed by Adin Burks III, MD at [facility] OR [address] [place] [address]    MYRINGOTOMY W/ TUBES Right  04/2017    MYRINGOTOMY WITH INSERTION OF PE TUBES Right 4/15/2017    Performed by Gary Null MD at [facility] OR [facility]    PLACEMENT-TUBE-PEG  5/15/2014    [facility]    PORTACATH PLACEMENT Right     rt sc    REDO BILATERAL LUNG TRANSPLANT; CLAMSHELL Bilateral 6/18/2019    Performed by Jonathan Newby MD at [facility] OR [address] [place] [address]    REMOVAL-TUBE-PEG  5/15/2014    [facility]    RHC for lung re-transplant N/A 1/22/2019    Performed by Laura Rachel MD at [facility] CATH LAB    ROBOTIC ASSISTED LAPAROSCOPIC HYSTERECTOMY N/A 4/25/2017    Performed by Deny Dias Jr., MD at [facility] OR    SALPINGECTOMY Bilateral 2015    [facility]    SALPINGECTOMY-LAPAROSCOPIC Bilateral 5/19/2015    Performed by Deny Dias Jr., MD at [facility] OR    SINUS SURGERY FUNCTIONAL ENDOSCOPIC WITH NAVIGATION Bilateral 4/3/2017    Performed by Cesar Olsen MD at [facility] OR [address] [place] [address]    SINUS SURGERY FUNCTIONAL ENDOSCOPIC WITH NAVIGATION, 36336, 27100, 31870, 17337 Bilateral 2/5/2015    Performed by Cesar Olsen MD at [facility] OR [address] [place] [address]    SPHENOIDECTOMY Bilateral 4/9/2019    Performed by Adin Burks III, MD at [facility] OR [address] [place] [address]    THYROPLASTY - Medialization laryngoplasty, cricothyroid subluxation, arytenoid repositioning Left 8/2/2016    [facility]    TRANSPLANT-LUNG Bilateral 6/11/2014    Performed by Adolfo Huston MD at [facility] OR [address] [place] [address]     Family History     Problem Relation (Age of Onset)    Cancer Maternal Grandmother    Diabetes Maternal Grandfather    Drug abuse Maternal Grandfather    Hypertension Maternal Grandmother    Thrombocytopenia Maternal Grandfather        Tobacco Use    Smoking status: Never Smoker    Smokeless tobacco: Never Used   Substance and Sexual Activity    Alcohol use: No     Comment: Has not had an alcoholic drink in more than 2 months.    Drug use: No    Sexual activity:  Yes     Partners: Male     Birth control/protection: See Surgical Hx     Comment: current partner since Oct 2016     Review of Systems   Unable to perform ROS: Acuity of condition     Objective:     Vital Signs (Most Recent):  Temp: 98.6 °F (37 °C) (09/01/19 1100)  Pulse: (!) 135 (09/01/19 1109)  Resp: (!) 40 (08/31/19 2321)  BP: (!) 86/54 (09/01/19 1100)  SpO2: 97 % (09/01/19 1109) Vital Signs (24h Range):  Temp:  [97.7 °F (36.5 °C)-102 °F (38.9 °C)] 98.6 °F (37 °C)  Pulse:  [127-162] 135  Resp:  [40] 40  SpO2:  [56 %-100 %] 97 %  BP: ()/(50-62) 86/54  Arterial Line BP: ()/(43-56) 101/47     Weight: 70 kg (154 lb 5.2 oz)  Body mass index is 30.14 kg/m².    Physical Exam   Constitutional: She appears well-developed and well-nourished. She appears ill. She is sedated, chemically paralyzed and intubated.   HENT:   Head: Normocephalic and atraumatic.   ET and OG tubes in place   Eyes: Right eye exhibits no discharge. Left eye exhibits no discharge. No scleral icterus.   Neck: Neck supple.   Cardiovascular: Normal rate and regular rhythm.   No doppler signals obtained in RLE or LLE   Pulmonary/Chest: She is intubated. No respiratory distress. She has no wheezes. She has rales.   Mechanical ventilator sounds   Abdominal: Soft. Bowel sounds are normal.   Musculoskeletal: She exhibits edema. She exhibits no tenderness or deformity.   Skin: Skin is warm. There is pallor.   Right leg with mottling noted, outlined per nursing staff  Left leg also noted to be mottled as are her bilateral hands   Nursing note and vitals reviewed.      Significant Labs:  BMP:   Recent Labs   Lab 09/01/19  0436   *   *   K 4.2   CL 95   CO2 19*   BUN 16   CREATININE 1.6*   CALCIUM 5.7*   MG 1.7     Lab Results   Component Value Date    WBC 30.12 (H) 09/01/2019    HGB 10.9 (L) 09/01/2019    HCT 35.0 (L) 09/01/2019    MCV 89 09/01/2019    PLT 31 (LL) 09/01/2019       Significant Diagnostics:  I have reviewed all pertinent  imaging results/findings within the past 24 hours.

## 2019-09-01 NOTE — ASSESSMENT & PLAN NOTE
30yo F s/p bilateral lung txp x 2, most recently June 2018 now with MRSA bacteremia, on CRRT, requiring multiple high dose vasopressors and s/p PEA arrest on 8/31 for whom vascular was consulted in the setting of cold, pulseless right lower extremtiy    - Patient seen and examined at time of code event on 8/31, given acuity and gravity of the situation attention to the RLE was deferred per primary. Re-consulted today given some stabilization and decrease in her pressor requirements. Discussed with vascular surgery staff  - Would like to obtain bilateral lower extremity duplex ultrasound to evaluate for possible flow in absence of doppler signals; her legs are cold and visably mottled but not gangrenous in nature.   - Continue to monitor progression of skin changes to lower extremities, as patient is still on multiple high dose vasopressors this may continue to progress and will need to demarcate  - She is currently too unstable for any surigical intervention but vascular will continue to follow and be available for intervention when indicated.

## 2019-09-01 NOTE — ASSESSMENT & PLAN NOTE
Blood cultures from 8/26 with MRSA. Right chest port removed. Repeat blood cultures NGTD. ID following, appreciate recs. Continue vancomycin, Zerbaxa, and Micafungin.

## 2019-09-01 NOTE — SUBJECTIVE & OBJECTIVE
Interval History: Patient with ongoing profound pressor requirement, paralysis on vent, on CRRT with mottling noted most prominent in RLE. CT CAP notable for thrombus in RA, R SVC, and R jugular vein with probable PSE throughout lungs.    Review of Systems   Unable to perform ROS: Intubated     Objective:     Vital Signs (Most Recent):  Temp: 99.4 °F (37.4 °C) (09/01/19 1200)  Pulse: (!) 133 (09/01/19 1507)  Resp: (!) 35 (09/01/19 1507)  BP: (!) 88/53 (09/01/19 1200)  SpO2: (!) 90 % (09/01/19 1507) Vital Signs (24h Range):  Temp:  [97.7 °F (36.5 °C)-99.4 °F (37.4 °C)] 99.4 °F (37.4 °C)  Pulse:  [127-144] 133  Resp:  [35-40] 35  SpO2:  [56 %-100 %] 90 %  BP: ()/(50-62) 88/53  Arterial Line BP: ()/(43-56) 126/55     Weight: 70 kg (154 lb 5.2 oz)  Body mass index is 30.14 kg/m².    Estimated Creatinine Clearance: 44.9 mL/min (A) (based on SCr of 1.6 mg/dL (H)).    Physical Exam   Constitutional: She appears well-developed. She has a sickly appearance. She appears distressed.   Intubated, sedated, paralyzed, on max pressors, CRRT.   HENT:   Head: Normocephalic.   Neck: No tracheal deviation present.   Cardiovascular: Regular rhythm. Tachycardia present.   No murmur heard.  Pulmonary/Chest: She is in respiratory distress. She has decreased breath sounds in the right lower field. She has no wheezes. She has rhonchi in the right upper field, the right middle field, the left upper field and the left middle field. She has no rales.   Intubated, paralyzed.   Abdominal: She exhibits no distension. There is no tenderness.   Genitourinary:   Genitourinary Comments: saleh   Musculoskeletal: She exhibits edema. She exhibits no deformity.   Neurological:   Paralyzed, sedated.   Skin: Skin is warm and dry. Rash noted. There is pallor.   Mottling most prominent in RLE.   Nursing note and vitals reviewed.      Significant Labs:   CBC:   Recent Labs   Lab 09/01/19  0436 09/01/19  0600 09/01/19  0800   WBC 26.91* 27.41*  30.12*   HGB 11.0* 10.8* 10.9*   HCT 33.6* 34.1* 35.0*   PLT 30* 26* 31*     CMP:   Recent Labs   Lab 08/31/19  1413  08/31/19  2200  09/01/19  0436 09/01/19  0600 09/01/19  0800   *  --  131*  --  131*  --   --    K 3.2*  --  3.9  --  4.2  --   --    CL 93*  --  94*  --  95  --   --    CO2 21*  --  20*  --  19*  --   --    *  --  314*  --  253*  --   --    BUN 15  --  15  --  16  --   --    CREATININE 1.7*  --  1.6*  --  1.6*  --   --    CALCIUM 6.5*  --  5.9*  --  5.7*  --   --    PROT  --    < >  --    < > 3.7* 3.6* 3.8*   ALBUMIN 1.2*   < > 1.2*   < > 1.2*  1.2* 1.1* 1.2*   BILITOT  --    < >  --    < > 0.8 0.7 0.7   ALKPHOS  --    < >  --    < > 216* 217* 246*   AST  --    < >  --    < > 1,340* 1,337* 1,373*   ALT  --    < >  --    < > 396* 399* 423*   ANIONGAP 19*  --  17*  --  17*  --   --    EGFRNONAA 39.9*  --  42.9*  --  42.9*  --   --     < > = values in this interval not displayed.       Significant Imaging: I have reviewed all pertinent imaging results/findings within the past 24 hours.

## 2019-09-01 NOTE — CARE UPDATE
6/12/2019          Padma Cisneros  [address] [facility] [address]              Dear Dayo Prajapati,        A refill for a 90 day supply of your medication has been sent to your pharmacy. I   would like to remind you that you are due for an appointment and/or lab work at your earliest convenience. It appears your last visit was in March 2018. Please call our office to schedule an appointment for further refills.             Sincerely,        Carroll Stephen MD.   [address] [place] [address], [address] [place] [address]  [phone] Transported pt to CT on transport ventilator with no adverse reactions.Will continue to monitor.

## 2019-09-01 NOTE — PROGRESS NOTES
[facility]  Nephrology  Progress Note    Patient Name: Juanita Ibarra  MRN: [id number]  Admission Date: 8/26/2019  Hospital Length of Stay: 6 days  Attending Provider: Jake Alvarez MD   Primary Care Physician: Primary Doctor No  Principal Problem:Septic shock    Subjective:     HPI: A 30 y/o female s/p bilateral re-transplant (6/18/19) secondary to CF, who presented to the ED in acute respiratory distress. Patient's family member reports that she had and episode of fever 2 days ago accompanied by productive cough requiring increasing at home oxygen to ~5L. Also, caregiver reports poor appetite and PO intake over the last week. Upon arrival to ED, oxygen saturations were in mid 70s while on NRB and patient was placed on BiPAP which was transitioned to 25L comfort flow at 100% FiO2. On arrival to ED and worsening clinical status patient was transferred to the ICU, intubated, and started on Zerbaxa/Vancomycin.  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    Nephrology consulted for evaluation of LISBETH and Metabolic acidosis.    Interval History: Patient evaluated bedside, intubated and supine today. Patient unable to receive RRT 8/30 due to cath clotting, as well as tachycardia with HR in 170s and hypotension requiring multiple pressors. SLED resumed yesterday afternoon. Patient is net positive 8L past 24 hours.     Review of patient's allergies indicates:   Allergen Reactions    Albuterol Palpitations    Colistin Anaphylaxis    Vancomycin analogues      Infusion reaction that does not resolve with slowing  Pt. States she can tolerate it when given with 50 mg Benadryl and ran over 3 hours    Neupogen [filgrastim] Other (See Comments)     Ostealgia after five daily doses of 300 mcg.      Bactrim [sulfamethoxazole-trimethoprim] Hives     Ceftazidime Hives     Pt stated can tolerate cefapine not ceftazidime    Ceftazidime     Dronabinol Other (See Comments)     Mental changes/hallucinations    Haldol [haloperidol lactate] Other (See Comments)     Seizure like activity    Nsaids (non-steroidal anti-inflammatory drug)      Cannot have due to lung transplant    Adhesive Rash     Cloth tape- please use tegaderm or paper tape    Aztreonam Rash    Ciprofloxacin Nausea And Vomiting     Projectile N/V, per patient.  Unwilling to retry therapy.     Current Facility-Administered Medications   Medication Frequency    0.9%  NaCl infusion (CRRT USE ONLY) Continuous    0.9%  NaCl infusion (for blood administration) Q24H PRN    0.9%  NaCl infusion (for blood administration) Q24H PRN    angiotensin II (GIAPREZA) 2,500,000 ng in sodium chloride 0.9% 250 mL infusion Continuous    carboxymethylcellulose sodium 1 % DpGe TID    ceftolozane-tazobactam (ZERBAXA) 1,500 mg in dextrose 5 % 100 mL Q8H    chlorhexidine 0.12 % solution 15 mL BID    cisatracurium (NIMBEX) 200 mg in dextrose 5 % 100 mL infusion Continuous    dexmedetomidine (PRECEDEX) 400mcg/100mL 0.9% NaCL infusion Continuous    dextrose 50% injection 12.5 g PRN    diphenhydrAMINE 12.5 mg/5 mL elixir 25 mg Daily PRN    diphenhydrAMINE injection 50 mg Q6H PRN    fentaNYL 2500 mcg in 0.9% sodium chloride 250 mL infusion premix (titrating) Continuous    glucagon (human recombinant) injection 1 mg PRN    heparin (porcine) injection 5,000 Units Q12H    heparin 25,000 units in dextrose 5% 250 mL (100 units/mL) infusion (heparin infusion) Continuous    hydrocortisone sodium succinate injection 50 mg Q6H    HYDROmorphone injection 1 mg Q6H PRN    insulin aspart U-100 pen 0-5 Units Q6H PRN    levalbuterol nebulizer solution 1.25 mg Q8H    LORazepam tablet 1 mg Q8H PRN    magnesium sulfate 2g in water 50mL IVPB (premix) PRN    metoprolol injection 10 mg Q5 Min PRN    micafungin 100 mg in  sodium chloride 0.9 % 100 mL IVPB (ready to mix system) Q24H    midazolam (VERSED) 1 mg/mL in dextrose 5 % 100 mL infusion (non-titrating) Continuous    norepinephrine 16 mg in dextrose 5 % 250 mL infusion Continuous    ondansetron injection 8 mg Q6H PRN    oxyCODONE immediate release tablet Tab 10 mg Q8H PRN    PHENYLephrine (WASHINGTON-SYNEPHRINE) 20 mg in sodium chloride 0.9% 250ml (titrating) Continuous    promethazine (PHENERGAN) 12.5 mg in dextrose 5 % 50 mL IVPB Q6H PRN    sodium bicarbonate 1 mEq/mL (8.4 %) 150 mEq in dextrose 5 % 1,000 mL infusion Continuous    sodium chloride 0.9% bolus 1,000 mL Once    sodium chloride 0.9% bolus 500 mL Once    sodium phosphate 20.01 mmol in dextrose 5 % 250 mL IVPB PRN    sodium phosphate 30 mmol in dextrose 5 % 250 mL IVPB PRN    sodium phosphate 39.99 mmol in dextrose 5 % 250 mL IVPB PRN    vasopressin (PITRESSIN) 0.2 Units/mL in dextrose 5 % 100 mL infusion Continuous    voriconazole (VFEND) 200 mg in dextrose 5 % 100 mL IVPB Q12H       Objective:     Vital Signs (Most Recent):  Temp: 98.8 °F (37.1 °C) (09/01/19 0800)  Pulse: (!) 135 (09/01/19 0807)  Resp: (!) 40 (08/31/19 2321)  BP: (!) 83/51 (09/01/19 0800)  SpO2: 97 % (09/01/19 0807)  O2 Device (Oxygen Therapy): ventilator (09/01/19 0807) Vital Signs (24h Range):  Temp:  [97.7 °F (36.5 °C)-103.5 °F (39.7 °C)] 98.8 °F (37.1 °C)  Pulse:  [127-173] 135  Resp:  [35-40] 40  SpO2:  [56 %-100 %] 97 %  BP: ()/(50-67) 83/51  Arterial Line BP: ()/(43-56) 101/45     Weight: 70 kg (154 lb 5.2 oz) (09/01/19 0400)  Body mass index is 30.14 kg/m².  Body surface area is 1.72 meters squared.    I/O last 3 completed shifts:  In: 20774.3 [I.V.:64463.4; Blood:1983.3; Other:361.6; NG/GT:180]  Out: 3383 [Drains:50; Other:3333]    Physical Exam   Constitutional: She appears well-developed. She is sedated and intubated.   on mechanical ventilation   HENT:   Head: Normocephalic and atraumatic.   Eyes: Right eye exhibits  no discharge. Left eye exhibits no discharge. No scleral icterus.   Neck: Neck supple.   Cardiovascular: Normal rate and regular rhythm.   Pulmonary/Chest: She is intubated. No respiratory distress. She has no wheezes. She has rales.   Transmitted ventilator sounds   Abdominal: Soft. Bowel sounds are normal.   Musculoskeletal: She exhibits edema. She exhibits no tenderness or deformity.   Neurological: She is alert.   Skin: Skin is warm. There is pallor.   Nursing note and vitals reviewed.      Significant Labs:  CBC:   Recent Labs   Lab 09/01/19  0436  09/01/19  0800   WBC 26.91*   < > 30.12*   RBC 3.83*   < > 3.92*   HGB 11.0*   < > 10.9*   HCT 33.6*   < > 35.0*   PLT 30*  --   --    MCV 88   < > 89   MCH 28.7   < > 27.8   MCHC 32.7   < > 31.1*    < > = values in this interval not displayed.     CMP:   Recent Labs   Lab 09/01/19  0436 09/01/19  0600   *  --    CALCIUM 5.7*  --    ALBUMIN 1.2*  1.2* 1.1*   PROT 3.7* 3.6*   *  --    K 4.2  --    CO2 19*  --    CL 95  --    BUN 16  --    CREATININE 1.6*  --    ALKPHOS 216* 217*   * 399*   AST 1,340* 1,337*   BILITOT 0.8 0.7     All labs within the past 24 hours have been reviewed.     Significant Imaging:  CXR personally reviewed.    Assessment/Plan:     LISBETH (acute kidney injury)  Ms. 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 Her baseline SCr in the past year has been around ~ 1.0. LISBETH likely secondary to ATN caused by hypotension/sepsis in the setting of severe metabolic acidosis.    Recommendations:    - Strict I/O and chart  - Avoid nephrotoxic medications, NSAIDs, IV contrast, etc.  - Medication doses adjusted to GFR  - Maintain MAP > 65  - Patient's SaO2 dropped to 70s ~2 AM and coded, received 1 round of CPR.   - Had been receiving SLED 8/29-8/30 until about 4 pm on 8/30. Attempted restart SLED 8/31 prior to code, and after code, but both  attempts unsuccessful due to clotting of femoral catheter.   - Patient unable to receive RRT 8/30 due to cath clotting, as well as tachycardia with HR in 170s and hypotension requiring multiple pressors. SLED resumed yesterday afternoon. Patient is net positive 8L past 24 hours. Will plan on continuing SLED today          Thank you for your consult. I will follow-up with patient. Please contact us if you have any additional questions.    Radha Calvin MD  Nephrology  [facility]    ATTENDING PHYSICIAN ATTESTATION  I have personally assessed and examined the patient. I thoroughly reviewed the demographic, clinical, laboratorial and imaging information available in medical records. I agree with the assessment and recommendations provided by the subspecialty resident. Dr. Calvin was under my supervision.

## 2019-09-01 NOTE — PROGRESS NOTES
Pharmacokinetic Follow Up: Tobramycin  · One time dose of tobramycin 90 given 08/31 @ 04:15   · Tobramycin level drawn on 08/31 @ 14:13: 2.7 mcg/L  · Goal peak level < 1 mcg/L  · Plan to redraw level on 09/02 @ 16:00  · Re-dose accordingly if level < 1 mcg/L    Drug levels (last 3 results):  No results for input(s): AMIKACINPEAK, AMIKACINTROU, AMIKACINRAND, AMIKACIN in the last 72 hours.    No results for input(s): GENTAMICIN, GENTPEAK, GENTTROUGH, GENT10, GENT12, GENT8, GENTRANDOM in the last 72 hours.    Recent Labs   Lab Result Units 08/31/19  1413   Tobramycin, Random ug/mL 2.7       Pharmacy will continue to monitor.    Please contact pharmacy at extension [phone] with any questions regarding this assessment.    Thank you for the consult,   Francisca Khan      Patient brief summary:  Juanita Ibarra is a 29 y.o. female initiated on aminoglycoside therapy for treatment of cystic fibrosis    Of note: for dosing recommendations in consults related to pulmonary exacerbations due to Cystic Fibrosis, please contact Infectious Disease (ID) or contact ID pharmacy.    Drug Allergies:   Review of patient's allergies indicates:   Allergen Reactions    Albuterol Palpitations    Colistin Anaphylaxis    Vancomycin analogues      Infusion reaction that does not resolve with slowing  Pt. States she can tolerate it when given with 50 mg Benadryl and ran over 3 hours    Neupogen [filgrastim] Other (See Comments)     Ostealgia after five daily doses of 300 mcg.      Bactrim [sulfamethoxazole-trimethoprim] Hives    Ceftazidime Hives     Pt stated can tolerate cefapine not ceftazidime    Ceftazidime     Dronabinol Other (See Comments)     Mental changes/hallucinations    Haldol [haloperidol lactate] Other (See Comments)     Seizure like activity    Nsaids (non-steroidal anti-inflammatory drug)      Cannot have due to lung transplant    Adhesive Rash     Cloth tape- please use tegaderm or paper tape    Aztreonam  Rash    Ciprofloxacin Nausea And Vomiting     Projectile N/V, per patient.  Unwilling to retry therapy.       Actual Body Weight:   50.2 kg    Adjust Body Weight:   47.4 kg    Ideal Body Weight:  45.5 kg    Renal Function:   Estimated Creatinine Clearance: 34.8 mL/min (A) (based on SCr of 1.7 mg/dL (H)).,     Dialysis Method (if applicable):  CRRT    CBC (last 72 hours):  Recent Labs   Lab Result Units 08/29/19  0300 08/30/19  0301 08/30/19  1745 08/30/19  2200 08/31/19  0142 08/31/19  0400 08/31/19  0755 08/31/19  1151 08/31/19  1559 08/31/19  2000   WBC K/uL 5.71 12.71* 10.66 9.24 10.07 7.36  7.36 11.18 17.10* 24.96*  24.96* 29.65*   Hemoglobin g/dL 8.0* 8.3* 7.9* 6.4* 7.4* 12.2  12.2 12.2 12.0 10.7*  10.7* 11.2*   Hematocrit % 25.7* 27.2* 26.0* 21.8* 24.6* 37.1  37.1 37.3 37.2 33.9*  33.9* 33.3*   Platelets K/uL 124* 153 148* 140* 94* 76*  76* 78* 69* 55*  55* 48*   Gran% % 89.2* 86.0* 86.2* 63.0 61.0 62.0  62.0 78.1* 69.0 51.0  51.0 58.0   Lymph% % 5.6* 7.4* 7.9* 12.0* 13.0* 14.0*  14.0* 16.2* 15.0* 17.0*  17.0* 7.0*   Mono% % 3.9* 5.1 4.1 2.0* 3.0* 7.0  7.0 2.9* 1.0* 6.0  6.0 11.0   Eosinophil% % 0.0 0.0 0.0 0.0 0.0 0.0  0.0 0.0 0.0 0.0  0.0 0.0   Basophil% % 0.2 0.4 0.6 1.0 0.0 0.0  0.0 0.4 0.0 0.0  0.0 0.0   Differential Method  Automated Automated Automated Manual Manual Manual  Manual Automated Automated Manual  Manual Manual       Metabolic Panel (last 72 hours):  Recent Labs   Lab Result Units 08/29/19  0300 08/29/19  1430 08/29/19  2206 08/30/19  0301 08/30/19  1359 08/30/19  2200 08/31/19  0142 08/31/19  0400 08/31/19  0755 08/31/19  1151 08/31/19  1413 08/31/19  1559 08/31/19  2000   Sodium mmol/L 136 136 138 138 138 133*  --  142  --   --  133*  --   --    Potassium mmol/L 4.4 4.3 4.3 4.3 4.1 4.4  --  3.7  --   --  3.2*  --   --    Chloride mmol/L 108 103 102 103 102 101  --  97  --   --  93*  --   --    CO2 mmol/L 11* 17* 18* 21* 22* 13*  --  25  --   --  21*  --   --    Glucose  mg/dL 161* 149* 123* 119* 115* 250*  --  198*  --   --  341*  --   --    BUN, Bld mg/dL 19 5* 2* 3* 5* 8  --  11  --   --  15  --   --    Creatinine mg/dL 1.2 0.6 0.5 0.5 0.6 0.9  --  1.1  --   --  1.7*  --   --    Albumin g/dL 2.5*  2.5* 2.4* 2.6* 2.7*  2.7* 2.5* 1.8*  1.8* 1.5* 1.5*  1.5*  1.5* 1.4* 1.4* 1.2* 1.2* 1.3*   Total Bilirubin mg/dL 0.3  --   --  0.4  --  0.4 0.4 0.6  0.6 0.5 0.4  --  0.5 0.8   Alkaline Phosphatase U/L 174*  --   --  195*  --  173* 147* 165*  165* 167* 170*  --  160* 185*   AST U/L 16  --   --  17  --  21 83* 1,586*  1,586* 3,528* 3,439*  --  2,216* 1,951*   ALT U/L 11  --   --  10  --  8* 20 363*  363* 534* 516*  --  420* 443*   Magnesium mg/dL 2.0 2.0 1.9 1.9 1.9 3.0*  --  2.3  --   --  1.9  --   --    Phosphorus mg/dL 4.7* 1.3* 3.5 1.7* 4.0 5.8*  --  7.0*  --   --  6.2*  --   --        Aminoglycoside Administrations:  aminoglycosides given in last 96 hours                   tobramycin (NEBCIN) 90 mg in dextrose 5 % IVPB (mg) 90 mg New Bag 08/31/19 0351                Microbiologic Results:  Microbiology Results (last 7 days)     Procedure Component Value Units Date/Time    Blood culture [[id number]] Collected:  08/30/19 1450    Order Status:  Completed Specimen:  Blood from Peripheral, Hand, Left Updated:  08/31/19 1625     Blood Culture, Routine Gram stain lisa bottle: Gram positive cocci       Results called to and read back by:Torri Murdock RN 08/31/2019  16:23    Blood culture [[id number]] Collected:  08/30/19 1445    Order Status:  Completed Specimen:  Blood from Peripheral, Forearm, Right Updated:  08/31/19 1612     Blood Culture, Routine No Growth to date      No Growth to date    Blood culture [[id number]] Collected:  08/28/19 1250    Order Status:  Completed Specimen:  Blood from Peripheral, Hand, Right Updated:  08/31/19 1612     Blood Culture, Routine No Growth to date      No Growth to date      No Growth to date      No Growth to date    Blood culture [[id number]]   (Abnormal)  (Susceptibility) Collected:  08/28/19 1020    Order Status:  Completed Specimen:  Blood from Peripheral, Wrist, Right Updated:  08/31/19 1043     Blood Culture, Routine Gram stain aer bottle: Gram positive cocci in clusters resembling Staph      Results called to and read back by:Lashanda Rios RN 08/29/2019  17:38      METHICILLIN RESISTANT STAPHYLOCOCCUS AUREUS  ID consult required at [facility].      Blood culture #1 **CANNOT BE ORDERED STAT** [[id number]]  (Abnormal) Collected:  08/26/19 0848    Order Status:  Completed Specimen:  Blood from Line, Port A Cath Updated:  08/29/19 1111     Blood Culture, Routine Gram stain lisa bottle: Gram positive cocci in clusters resembling Staph       Results called to and read back by:Monet Martinez RN 08/27/2019  06:04      Gram stain aer bottle: Gram positive cocci in clusters resembling Staph       08/28/2019  10:29      METHICILLIN RESISTANT STAPHYLOCOCCUS AUREUS  ID consult required at [facility].  For susceptibility see order #[id number]      Blood culture #2 **CANNOT BE ORDERED STAT** [[id number]]  (Abnormal)  (Susceptibility) Collected:  08/26/19 0859    Order Status:  Completed Specimen:  Blood from Wrist, Right Updated:  08/29/19 1110     Blood Culture, Routine Gram stain aer bottle: Gram positive cocci in clusters resembling Staph       Results called to and read back by:Monet Martinez RN 08/27/2019 03:23      Gram stain lisa bottle: Gram positive cocci in clusters resembling Staph       08/27/2019  11:38      METHICILLIN RESISTANT STAPHYLOCOCCUS AUREUS  ID consult required at [facility].      Culture, Respiratory [[id number]]  (Abnormal)  (Susceptibility) Collected:  08/26/19 1441    Order Status:  Completed Specimen:  Respiratory from Bronchial Wash Updated:  08/28/19 1213     Respiratory Culture METHICILLIN RESISTANT  STAPHYLOCOCCUS AUREUS  Many       Gram Stain (Respiratory) <10 epithelial cells per low power field.     Gram Stain (Respiratory) Few WBC's     Gram Stain (Respiratory) Many Gram positive cocci    Narrative:       Bronchial Wash    Fungus culture [[id number]]  (Abnormal) Collected:  08/26/19 1441    Order Status:  Completed Specimen:  Respiratory from Bronchial Wash Updated:  08/28/19 1101     Fungus (Mycology) Culture YEAST   Many  Identification pending      Narrative:       Bronchial Wash    AFB Culture & Smear [[id number]] Collected:  08/26/19 1441    Order Status:  Completed Specimen:  Respiratory from Bronchial Wash Updated:  08/27/19 2127     AFB Culture & Smear Culture in progress     AFB CULTURE STAIN No acid fast bacilli seen.    Narrative:       Bronchial Wash    Respiratory Infection Panel, Nasopharyngeal [[id number]] Collected:  08/26/19 1003    Order Status:  Completed Specimen:  Nasopharyngeal Swab Updated:  08/26/19 1344     Respiratory Infection Panel Source NP Swab     Adenovirus Not Detected     Coronavirus 229E Not Detected     Coronavirus HKU1 Not Detected     Coronavirus NL63 Not Detected     Coronavirus OC43 Not Detected     Human Metapneumovirus Not Detected     Human Rhinovirus/Enterovirus Not Detected     Influenza A (subtypes H1, H1-2009,H3) Not Detected     Influenza B Not Detected     Parainfluenza Virus 1 Not Detected     Parainfluenza Virus 2 Not Detected     Parainfluenza Virus 3 Not Detected     Parainfluenza Virus 4 Not Detected     Respiratory Syncytial Virus Not Detected     Bordetella Parapertussis (FV9495) Not Detected     Bordetella pertussis (ptxP) Not Detected     Chlamydia pneumoniae Not Detected     Mycoplasma pneumoniae Not Detected    Narrative:       For all other respiratory sources order [id number] Respiratory  Viral Panel by PCR (RVPCR)    Culture, Respiratory  - Cystic Fibrosis [[id number]]     Order Status:  Canceled Specimen:  Respiratory

## 2019-09-01 NOTE — CONSULTS
[facility]  Vascular Surgery  Consult Note    Inpatient consult to Vascular Surgery  Consult performed by: Yasmin Sanchez MD  Consult ordered by: Reyes Will NP        Subjective:     Chief Complaint/Reason for Admission: MRSA bacteremia    History of Present Illness: Ms. Ibarra is a 28 yo female s/p bilateral re-transplant (6/18/19, initial in 2014) who presented in respiratory distress on 8/26 which acutely worsened in the ED requiring ICU admission and intubation upon presentation.  Since then her course has been complicated by persistent respiratory failure requiring intermittent proning, renal failure requiring CRRT and cultures positive for staph bacteremia (blood cultures positive for staph aureus on 8/26). She continues to have blood cultures positive for MRSA (last 8/30) for which ID is following and she is on IV antibitotics. She is also now s/p PEA arrest in the early morning hours of 8/31. In the hours surrounding her code event she was found to have a cold, pulseless right lower extremity in setting of femoral A-line, which has since been removed. Her shock is being treated with norepinephrine, angiotensin-II, vasopressin, and neosynephrine. She remains on high doses on all vasopressor medication though they are titrating down from levels immediately following code. Vascular surgery was consulted given pulseless right lower extremity.    Medications Prior to Admission   Medication Sig Dispense Refill Last Dose    blood sugar diagnostic Strp Preferred by insurance. Testing BG 4 times daily. 120 strip 10 8/25/2019    blood-glucose meter Misc Use to check blood glucose 4 (four) times daily. 1 each 1 8/25/2019    calcium-vitamin D3 (OS-KATY 500 + D3) 500 mg(1,250mg) -200 unit per tablet Take 1 tablet by mouth 2 (two) times daily with meals. 60 tablet 11 8/25/2019    dapsone 100 MG Tab Take 1 tablet (100 mg total) by mouth once daily. 30 tablet 11 8/25/2019     diphenhydrAMINE (BENADRYL) 50 MG tablet Take 1 tablet (50 mg total) by mouth every 6 (six) hours as needed for Itching. 1 tablet 0 8/25/2019    fexofenadine (ALLEGRA) 180 MG tablet Take 180 mg by mouth once daily.   8/25/2019    fluticasone propionate (FLONASE) 50 mcg/actuation nasal spray INSERT 2 SPRAYS IN EACH NOSTRIL DAILY 16 g 5 8/25/2019    folic acid (FOLVITE) 1 MG tablet Take 1 tablet (1,000 mcg total) by mouth once daily. 30 tablet 11 8/25/2019    gabapentin (NEURONTIN) 300 MG capsule Take 1 capsule (300 mg total) by mouth 3 (three) times daily. 90 capsule 11 8/25/2019    hydrocortisone 1 % cream Apply topically 2 (two) times daily. 28.35 g 0 8/25/2019    inhalation spacing device ([employer]) USE AS DIRECTED 1 Device 0 8/25/2019    insulin aspart U-100 (NOVOLOG) 100 unit/mL (3 mL) InPn pen Inject 1-10 Units into the skin before meals and at bedtime as needed (Hyperglycemia). 15 mL 0 8/25/2019    lancets 33 gauge Misc Use to check blood glucose four times daily 120 each 11 8/25/2019    levalbuterol (XOPENEX) 1.25 mg/3 mL nebulizer solution Take 3 mLs (1.25 mg total) by nebulization every 8 (eight) hours as needed for Wheezing or Shortness of Breath. Rescue 90 mL 5 8/25/2019    lipase-protease-amylase (CREON) 36,000-114,000- 180,000 unit CpDR Take 4 capsules by mouth 3 (three) times daily with meals. Take 3 with snacks as needed. (Patient taking differently: Take 5 capsules by mouth 3 (three) times daily with meals. ) 800 capsule 11 8/25/2019    LORazepam (ATIVAN) 1 MG tablet Take 1 tablet (1 mg total) by mouth every 8 (eight) hours as needed for Anxiety. 90 tablet 2 8/25/2019    magnesium oxide (MAG-OX) 400 mg (241.3 mg magnesium) tablet Take 1 tablet (400 mg total) by mouth 3 (three) times daily. 90 tablet 11 8/25/2019    metoprolol tartrate (LOPRESSOR) 25 MG tablet Take 1 tablet (25 mg total) by mouth 2 (two) times daily. 60 tablet 11 8/25/2019    montelukast (SINGULAIR) 10 mg tablet Take 1  "tablet (10 mg total) by mouth once daily. 30 tablet 11 8/25/2019    morphine (MS CONTIN) 15 MG 12 hr tablet Take 1 tablet by mouth twice daily 60 tablet 0 8/25/2019    multivit,min52-folic-vitK-cQ10 (AQUADEKS) 100-700-10 mcg-mcg-mg Cap cap 1 tab twice daily 60 capsule 0 8/25/2019    omeprazole (PRILOSEC) 40 MG capsule Take 1 capsule (40 mg total) by mouth once daily. 30 capsule 11 8/25/2019    ondansetron (ZOFRAN) 8 MG tablet Take 1 tablet (8 mg total) by mouth every 12 (twelve) hours as needed for Nausea. 30 tablet 11 8/25/2019    oxyCODONE (ROXICODONE) 5 MG immediate release tablet Take 1 tablet by mouth four times a day 120 tablet 0 8/25/2019    pen needle, diabetic 32 gauge x 5/32" Ndle Injecting 3 times a day 100 each 1 8/25/2019    polyethylene glycol (GLYCOLAX) 17 gram PwPk Take 17 g by mouth 2 (two) times daily. 60 packet 11 8/25/2019    predniSONE (DELTASONE) 5 MG tablet Take 3 tablets (15 mg total) by mouth once daily. 90 tablet 11 8/25/2019    QUEtiapine (SEROQUEL) 25 MG Tab Take 1 tablet by mouth in morning and 2 tablets at bedtime 90 tablet 2 8/25/2019    SITagliptin (JANUVIA) 100 MG Tab Take 1 tablet (100 mg total) by mouth once daily. 30 tablet 11 8/25/2019    tacrolimus (PROGRAF) 0.5 MG Cap Take 1 capsule (0.5 mg total) by mouth every morning. Total daily dose is 1.5 mg every morning and 1 mg every evening. 30 capsule 11 8/25/2019    tacrolimus (PROGRAF) 1 MG Cap Take 1 capsule (1 mg total) by mouth every 12 (twelve) hours. 60 capsule 11 8/25/2019    tiZANidine (ZANAFLEX) 4 MG tablet Take 1 tablet (4 mg total) by mouth 2 (two) times daily. 60 tablet 1 8/25/2019    topiramate (TOPAMAX) 25 MG tablet Take 1 tablet (25 mg total) by mouth 2 (two) times daily 60 tablet 1 8/25/2019    valGANciclovir (VALCYTE) 450 mg Tab Take 1 tablet (450 mg total) by mouth 2 (two) times daily. 60 tablet 11 8/25/2019    venlafaxine (EFFEXOR-XR) 150 MG Cp24 Take 1 capsule (150 mg total) by mouth once daily. " 30 capsule 2 8/25/2019    venlafaxine (EFFEXOR-XR) 37.5 MG 24 hr capsule Take 1 capsule (37.5 mg total) by mouth once daily. 30 capsule 2 8/25/2019    voriconazole (VFEND) 200 MG Tab Take 1 tablet (200 mg total) by mouth 2 (two) times daily. 60 tablet 5 8/25/2019       Review of patient's allergies indicates:   Allergen Reactions    Albuterol Palpitations    Colistin Anaphylaxis    Vancomycin analogues      Infusion reaction that does not resolve with slowing  Pt. States she can tolerate it when given with 50 mg Benadryl and ran over 3 hours    Neupogen [filgrastim] Other (See Comments)     Ostealgia after five daily doses of 300 mcg.      Bactrim [sulfamethoxazole-trimethoprim] Hives    Ceftazidime Hives     Pt stated can tolerate cefapine not ceftazidime    Ceftazidime     Dronabinol Other (See Comments)     Mental changes/hallucinations    Haldol [haloperidol lactate] Other (See Comments)     Seizure like activity    Nsaids (non-steroidal anti-inflammatory drug)      Cannot have due to lung transplant    Adhesive Rash     Cloth tape- please use tegaderm or paper tape    Aztreonam Rash    Ciprofloxacin Nausea And Vomiting     Projectile N/V, per patient.  Unwilling to retry therapy.       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 OR 2ND FLR    Bronchoscopy N/A 7/5/2019    Performed by Francisca Morin DO at [facility] OR 2ND [facility]LR    BRONCHOSCOPY Bilateral 12/11/2018    Performed by Francisca Morin DO at [facility] OR [facility]ND [facility]    BRONCHOSCOPY N/A 10/1/2018    Performed by Jake Alvarez MD at [facility] OR [facility]ND [facility]    BRONCHOSCOPY Bilateral 12/20/2016    Performed by Jake Alvarez MD at [facility] OR 2ND [facility]    BRONCHOSCOPY - flexible bronchoscopy with probable tissue biopsy CPT 04805 N/A 7/21/2015    Performed by Jake Alvarez MD at [facility] OR [facility]ND [facility]    BRONCHOSCOPY - flexible bronchoscopy with probable tissue biospy CPT 63570 N/A 10/20/2015    Performed by Jake Alvarez MD at [facility] OR [facility]ND [facility]    BRONCHOSCOPY - flexible bronchoscopy with tissue biopsy CPT 67069 N/A 9/29/2015    Performed by Jake Alvarez MD at [facility] OR [facility]ND [facility]    BRONCHOSCOPY - flexible bronchoscopy with tissue biopsy CPT 57008 N/A 5/26/2015    Performed by Jake Alvarez MD at [facility] OR 1ST FLR    BRONCHOSCOPY flexible bronchoscopy with tissue biopsy N/A 9/8/2014    Performed by Jake Alvarez MD at [facility] OR [facility]ND [facility]    BRONCHOSCOPY flexible with possible tissue biopsy N/A 8/8/2014    Performed by Jake Alvarez MD at [facility] OR [facility]ND [facility]    BRONCHOSCOPY, BAL, BIOPSIES N/A 3/20/2018    Performed by Jake Alvarez MD at [facility] OR [facility]ND [facility]    BRONCHOSCOPY-FIBEROPTIC N/A 1/29/2016    Performed by Joann Cifuentes DO at [facility] OR 2ND [facility]    BRONCHOSCOPY; Bronchoscopy with BAL and transbronchial biopsies under general anesthesia N/A 5/29/2018    Performed by Jake Alvarez MD at [facility] OR 2ND [facility]    CHOLECYSTECTOMY  2016    CHOLECYSTECTOMY-LAPAROSCOPIC N/A 7/22/2016    Performed by Joshua Goldberg, MD at [facility] OR 2ND [facility]    COLONOSCOPY N/A 5/3/2019    Performed by Juancho Rich MD at [facility] ENDO (2ND FLR)    ENDOMETRIAL ABLATION  2015    [facility]    ETHMOIDECTOMY Bilateral 4/9/2019    Performed by Adin Burks III, MD at [facility] OR 2ND FLR     EXPLORATION, WOUND - right chest N/A 8/28/2019    [facility]    FESS      FESS Bilateral 10/21/2013    Performed by Jared Whatley MD at [facility] OR [address] [place] [address]    FESS, USING COMPUTER-ASSISTED NAVIGATION N/A 4/9/2019    Performed by Adin Burks III, MD at [facility] OR [address] [place] [address]    FINE NEEDLE ASPIRATION (FNA)  of a cervical lymphadenopathy  1/29/2016    Performed by Joann Cifuentes DO at [facility] OR [address] [place] [address]    flex bronchoscopy with probable tissue biopsy N/A 7/31/2014    Performed by [facility] Diagnostic Provider at [facility] OR [address] [place] [address]    flexible bronchoscopy with tissue biopsy N/A 12/11/2014    Performed by Jake Alvarez MD at [facility] OR [address] [place] [address]    flexible bronchoscopy with tissue biopsy  CPT 96308 N/A 8/15/2019    Performed by Jake Alvarez MD at [facility] OR [address] [place] [address]    flexible bronchoscopy with tissue biopsy CPT 97848  N/A 7/26/2019    Performed by Jake Alvarez MD at [facility] OR [address] [place] [address]    HYSTERECTOMY  04/2017    [facility]    INJECTION-VOCAL CORD Left 6/24/2014    Performed by Jared Wahtley MD at [facility] OR [address] [place] [address]    MLAIBISLZ-HLMP-S-CATH to right chest and removal of portacath to left chests wall. Bilateral 6/24/2014    Performed by Zhen Dorado MD at [facility] OR [address] [place] [address]    LARYNX SURGERY  2016    LUNG TRANSPLANT Bilateral 6/12/14    MAXILLARY ANTROSTOMY  4/9/2019    Performed by Adin Burks III, MD at [facility] OR [address] [place] [address]    MYRINGOTOMY W/ TUBES Right 04/2017    MYRINGOTOMY WITH INSERTION OF PE TUBES Right 4/15/2017    [facility]    PLACEMENT-TUBE-PEG  5/15/2014    [facility]    PORTACATH PLACEMENT Right     rt sc    REDO BILATERAL LUNG TRANSPLANT; CLAMSHELL Bilateral 6/18/2019    Performed by Jonathan Newby MD at [facility] OR [address] [place] [address]    REMOVAL-TUBE-PEG  5/15/2014    Performed by David Moses MD at [facility] OR [address] [place] [address]    RHC for lung re-transplant N/A 1/22/2019     Performed by Laura Rachel MD at [facility] CATH LAB    ROBOTIC ASSISTED LAPAROSCOPIC HYSTERECTOMY N/A 4/25/2017    Performed by [facility]y Dias Jr., MD at [facility] OR    SALPINGECTOMY Bilateral 2015    [facility]    SALPINGECTOMY-LAPAROSCOPIC Bilateral 5/19/2015    Performed by Deny Dias Jr., MD at [facility] OR    SINUS SURGERY FUNCTIONAL ENDOSCOPIC WITH NAVIGATION Bilateral 4/3/2017    Performed by Cesar Oslen MD at [facility] OR [facility]    SINUS SURGERY FUNCTIONAL ENDOSCOPIC WITH NAVIGATION, 90444, 23786, 25153, 95472 Bilateral 2/5/2015    Performed by Cesar Olsen MD at [facility] OR 2ND [facility]LR    SPHENOIDECTOMY Bilateral 4/9/2019    Performed by Adin Burks III, MD at [facility] OR [address] [place] [address]    THYROPLASTY - Medialization laryngoplasty, cricothyroid subluxation, arytenoid repositioning Left 8/2/2016    [facility]    TRANSPLANT-LUNG Bilateral 6/11/2014    [facility]ust[facility]     Family History     Problem Relation (Age of Onset)    Cancer Maternal Grandmother    Diabetes Maternal Grandfather    Drug abuse Maternal Grandfather    Hypertension Maternal Grandmother    Thrombocytopenia Maternal Grandfather        Tobacco Use    Smoking status: Never Smoker    Smokeless tobacco: Never Used   Substance and Sexual Activity    Alcohol use: No     Comment: Has not had an alcoholic drink in more than 2 months.    Drug use: No    Sexual activity: Yes     Partners: Male     Birth control/protection: See Surgical Hx     Comment: current partner since Oct 2016     Review of Systems   Unable to perform ROS: Acuity of condition     Objective:     Vital Signs (Most Recent):  Temp: 98.6 °F (37 °C) (09/01/19 1100)  Pulse: (!) 135 (09/01/19 1109)  Resp: (!) 40 (08/31/19 2321)  BP: (!) 86/54 (09/01/19 1100)  SpO2: 97 % (09/01/19 1109) Vital Signs (24h Range):  Temp:  [97.7 °F (36.5 °C)-102 °F (38.9 °C)] 98.6 °F (37 °C)  Pulse:  [127-162] 135  Resp:  [40] 40  SpO2:   [56 %-100 %] 97 %  BP: ()/(50-62) 86/54  Arterial Line BP: ()/(43-56) 101/47     Weight: 70 kg (154 lb 5.2 oz)  Body mass index is 30.14 kg/m².    Physical Exam   Constitutional: She appears well-developed and well-nourished. She appears ill. She is sedated, chemically paralyzed and intubated.   HENT:   Head: Normocephalic and atraumatic.   ET and OG tubes in place   Eyes: Right eye exhibits no discharge. Left eye exhibits no discharge. No scleral icterus.   Neck: Neck supple.   Cardiovascular: Normal rate and regular rhythm.   No doppler signals obtained in RLE or LLE   Pulmonary/Chest: She is intubated. No respiratory distress. She has no wheezes. She has rales.   Mechanical ventilator sounds   Abdominal: Soft. Bowel sounds are normal.   Musculoskeletal: She exhibits edema. She exhibits no tenderness or deformity.   Skin: Skin is warm. There is pallor.   Right leg with mottling noted, outlined per nursing staff  Left leg also noted to be mottled as are her bilateral hands   Nursing note and vitals reviewed.      Significant Labs:  BMP:   Recent Labs   Lab 09/01/19  0436   *   *   K 4.2   CL 95   CO2 19*   BUN 16   CREATININE 1.6*   CALCIUM 5.7*   MG 1.7     Lab Results   Component Value Date    WBC 30.12 (H) 09/01/2019    HGB 10.9 (L) 09/01/2019    HCT 35.0 (L) 09/01/2019    MCV 89 09/01/2019    PLT 31 (LL) 09/01/2019       Significant Diagnostics:  I have reviewed all pertinent imaging results/findings within the past 24 hours.    Assessment/Plan:     Ischemic leg  30yo F s/p bilateral lung txp x 2, most recently June 2018 now with MRSA bacteremia, on CRRT, requiring multiple high dose vasopressors and s/p PEA arrest on 8/31 for whom vascular was consulted in the setting of cold, pulseless right lower extremtiy    - Patient seen and examined at time of code event on 8/31, given acuity and gravity of the situation attention to the RLE was deferred per primary. Re-consulted today given some  stabilization and decrease in her pressor requirements. Discussed with vascular surgery staff  - Would like to obtain bilateral lower extremity duplex ultrasound to evaluate for possible flow in absence of doppler signals; her legs are cold and visably mottled but not gangrenous in nature.   - Continue to monitor progression of skin changes to lower extremities, as patient is still on multiple high dose vasopressors this may continue to progress and will need to demarcate  - She is currently too unstable for any surigical intervention but vascular will continue to follow and be available for intervention when indicated.         Thank you for your consult.    Yasmin Sanchez MD  Vascular Surgery  [facility]

## 2019-09-01 NOTE — PROGRESS NOTES
[facility]  Infectious Disease  Progress Note    Patient Name: Juanita Ibarra  MRN: [id number]  Admission Date: 8/26/2019  Length of Stay: 6 days  Attending Physician: Jake Alvarez MD  Primary Care Provider: Primary Doctor No    Isolation Status: Contact and Neutropenic  Assessment/Plan:      * Septic shock  28yo woman w/a history of CF (c/b pancreatic insufficiency, sinus disease, MRSA/Pseudomonas colonization c/b numerous antibiotics allergies, and subsequent COPD/ESLD; s/p BOLT 6/12/2014, CMV D+/R-, simulect induction, on maintenance tacro/MMF/pred; c/b multiple episodes of MRSA/Pseudomonas pneumonia/sinusitis, C.glabrata early post-operative colonization 7/2014, A1 rejection 8/2014 s/p pulse SM, prior CMV reactivations, and subsequent grade 3 CARLOS EDUARDO; s/p redo BOLT 6/18/2019, CMV D-/R+, basiliximab induction, on maintenance tacro/MMF/pred; c/b  Pseudomonas and C.glabrata recipient derived post-transplant pneumonia s/p zerbaxa/micafungin course and invasive aspergillosis on chronic voriconazole) who was admitted on 8/26/2019 with 2 days of fevers, worsening SOB, productive cough, and hypoxic respiratory failure/septic shock due to MRSA pneumonia with associated septicemia (requiring pressors, intubation, and CRRT; associated with RIJ thrombus and port, s/p removal). She suffered a profound decompensation on 8/30 with shock requiring max doses of multiple pressors, reintubation with paralysis/pronation, possible anemia of blood loss, and acidosis culminating in PEA arrest requiring 1 round of CPR despite empiric broadening of coverage to include vanc, zerbaxa, tobramycin, vori/micafungin -- ultimately found to have large RA, SVC, and RIJ thrombus as precipitant for hemodynamic instability on imaging. She remains critically ill.    - would continue vancomycin (goal trough 15-20) for MRSA septicemia and pneumonia  - would continue empiric zerbaxa/tobra for now -- will likely taper off  if no new pathogens from new repeat cx  - would continue voriconazole for prior invasive aspergillosis; await BAL antigen from recent bronch; repeat level  - may continue micafungin while awaiting yeast ID   - remainder of prophylaxis per protocol  - care of diffuse RA/SVC thrombus deferred to lung transplant service  - will repeat cultures to assess for clearance in morning  - contact precautions for MRSA        Anticipated Disposition: pending improvement    Thank you for your consult. I will follow-up with patient. Please contact us if you have any additional questions.     Juanita Francis MD  Transplant ID Attending  [id number]    Juanita Francis MD  Infectious Disease  [facility]    Subjective:     Principal Problem:Septic shock    HPI: No notes on file  Interval History: Patient with ongoing profound pressor requirement, paralysis on vent, on CRRT with mottling noted most prominent in RLE. CT CAP notable for thrombus in RA, R SVC, and R jugular vein with probable PSE throughout lungs.    Review of Systems   Unable to perform ROS: Intubated     Objective:     Vital Signs (Most Recent):  Temp: 99.4 °F (37.4 °C) (09/01/19 1200)  Pulse: (!) 133 (09/01/19 1507)  Resp: (!) 35 (09/01/19 1507)  BP: (!) 88/53 (09/01/19 1200)  SpO2: (!) 90 % (09/01/19 1507) Vital Signs (24h Range):  Temp:  [97.7 °F (36.5 °C)-99.4 °F (37.4 °C)] 99.4 °F (37.4 °C)  Pulse:  [127-144] 133  Resp:  [35-40] 35  SpO2:  [56 %-100 %] 90 %  BP: ()/(50-62) 88/53  Arterial Line BP: ()/(43-56) 126/55     Weight: 70 kg (154 lb 5.2 oz)  Body mass index is 30.14 kg/m².    Estimated Creatinine Clearance: 44.9 mL/min (A) (based on SCr of 1.6 mg/dL (H)).    Physical Exam   Constitutional: She appears well-developed. She has a sickly appearance. She appears distressed.   Intubated, sedated, paralyzed, on max pressors, CRRT.   HENT:   Head: Normocephalic.   Neck: No tracheal deviation present.   Cardiovascular: Regular rhythm.  Tachycardia present.   No murmur heard.  Pulmonary/Chest: She is in respiratory distress. She has decreased breath sounds in the right lower field. She has no wheezes. She has rhonchi in the right upper field, the right middle field, the left upper field and the left middle field. She has no rales.   Intubated, paralyzed.   Abdominal: She exhibits no distension. There is no tenderness.   Genitourinary:   Genitourinary Comments: saleh   Musculoskeletal: She exhibits edema. She exhibits no deformity.   Neurological:   Paralyzed, sedated.   Skin: Skin is warm and dry. Rash noted. There is pallor.   Mottling most prominent in RLE.   Nursing note and vitals reviewed.      Significant Labs:   CBC:   Recent Labs   Lab 09/01/19  0436 09/01/19  0600 09/01/19  0800   WBC 26.91* 27.41* 30.12*   HGB 11.0* 10.8* 10.9*   HCT 33.6* 34.1* 35.0*   PLT 30* 26* 31*     CMP:   Recent Labs   Lab 08/31/19  1413  08/31/19  2200  09/01/19  0436 09/01/19  0600 09/01/19  0800   *  --  131*  --  131*  --   --    K 3.2*  --  3.9  --  4.2  --   --    CL 93*  --  94*  --  95  --   --    CO2 21*  --  20*  --  19*  --   --    *  --  314*  --  253*  --   --    BUN 15  --  15  --  16  --   --    CREATININE 1.7*  --  1.6*  --  1.6*  --   --    CALCIUM 6.5*  --  5.9*  --  5.7*  --   --    PROT  --    < >  --    < > 3.7* 3.6* 3.8*   ALBUMIN 1.2*   < > 1.2*   < > 1.2*  1.2* 1.1* 1.2*   BILITOT  --    < >  --    < > 0.8 0.7 0.7   ALKPHOS  --    < >  --    < > 216* 217* 246*   AST  --    < >  --    < > 1,340* 1,337* 1,373*   ALT  --    < >  --    < > 396* 399* 423*   ANIONGAP 19*  --  17*  --  17*  --   --    EGFRNONAA 39.9*  --  42.9*  --  42.9*  --   --     < > = values in this interval not displayed.       Significant Imaging: I have reviewed all pertinent imaging results/findings within the past 24 hours.

## 2019-09-02 NOTE — PROGRESS NOTES
Lung transplant patient followed for acute renal failure in setting of sepsis requiring RRT.   -Patient was seen on CRRT which was running for uremic toxin clearance/ UF for volume management.  -Patient hemodynamically not stable for intermittent HD.  -Will continue CRRT for volume and toxin clearance. Adjust UF as tolerated. Follow labs serially while on CRRT to monitor electrolytes and follow replacement protocol as needed.   -CRRT prescription, dialysate bath was reviewed and changes made as necessary.     Barbie Calvin MD  Nephrology PGY-4      ATTENDING PHYSICIAN ATTESTATION  I have personally assessed and examined the patient. I thoroughly reviewed the demographic, clinical, laboratorial and imaging information available in medical records. I agree with the assessment and recommendations provided by the subspecialty resident. Dr. Calvin was under my supervision.    DIALYSIS NOTE  Patient evaluated while undergoing Slow Low Efficiency Dialysis (SLED) indicated for LISBETH and hemodynamic instability. No complications, tolerating session with current UFR. Will continue current support.

## 2019-09-02 NOTE — ASSESSMENT & PLAN NOTE
Bilateral lower and upper extremities with severe mottling. Vascular surgery following, appreciate recs.

## 2019-09-02 NOTE — ASSESSMENT & PLAN NOTE
30yo woman w/a history of CF (c/b pancreatic insufficiency, sinus disease, MRSA/Pseudomonas colonization c/b numerous antibiotics allergies, and subsequent COPD/ESLD; s/p BOLT 6/12/2014, CMV D+/R-, simulect induction, on maintenance tacro/MMF/pred; c/b multiple episodes of MRSA/Pseudomonas pneumonia/sinusitis, C.glabrata early post-operative colonization 7/2014, A1 rejection 8/2014 s/p pulse SM, prior CMV reactivations, and subsequent grade 3 CARLOS EDUARDO; s/p redo BOLT 6/18/2019, CMV D-/R+, basiliximab induction, on maintenance tacro/MMF/pred; c/b  Pseudomonas and C.glabrata recipient derived post-transplant pneumonia s/p zerbaxa/micafungin course and invasive aspergillosis on chronic voriconazole) who was admitted on 8/26/2019 with 2 days of fevers, worsening SOB, productive cough, and hypoxic respiratory failure/septic shock due to MRSA pneumonia with associated septicemia (requiring pressors, intubation, and CRRT; associated with RIJ thrombus and port, s/p removal). She suffered a profound decompensation on 8/30 with shock requiring max doses of multiple pressors, reintubation with paralysis/pronation, possible anemia of blood loss, and acidosis culminating in PEA arrest requiring 1 round of CPR despite empiric broadening of coverage to include vanc, zerbaxa, tobramycin, vori/micafungin -- ultimately found to have large RA, SVC, and RIJ thrombus as precipitant for hemodynamic instability on imaging. She remains critically ill with some hemodynamic improvement on vancomycin and anticoagulation.    - would continue vancomycin (goal trough 15-20) for MRSA septicemia, infected thrombus, and pneumonia  - would continue voriconazole for prior invasive aspergillosis; await BAL antigen from recent bronch; repeat level  - may continue micafungin while awaiting yeast ID   - remainder of prophylaxis per protocol  - care of diffuse RA/SVC thrombus deferred to lung transplant service  - repeat cultures to assess for clearance  ordered today  - contact precautions for MRSA

## 2019-09-02 NOTE — PROGRESS NOTES
Angiotensin II gtt titrated to off per MD order as platelet count has continued to decrease, pt tolerated well, /50 (70) on 0.58 mcg/kg/min of Levo and Vaso @ 0.04 Units/min.

## 2019-09-02 NOTE — PROGRESS NOTES
[facility]  Infectious Disease  Progress Note    Patient Name: Juanita Ibarra  MRN: [id number]  Admission Date: 8/26/2019  Length of Stay: 7 days  Attending Physician: Jake Alvarez MD  Primary Care Provider: Primary Doctor No    Isolation Status: Contact and Neutropenic  Assessment/Plan:      * Septic shock  30yo woman w/a history of CF (c/b pancreatic insufficiency, sinus disease, MRSA/Pseudomonas colonization c/b numerous antibiotics allergies, and subsequent COPD/ESLD; s/p BOLT 6/12/2014, CMV D+/R-, simulect induction, on maintenance tacro/MMF/pred; c/b multiple episodes of MRSA/Pseudomonas pneumonia/sinusitis, C.glabrata early post-operative colonization 7/2014, A1 rejection 8/2014 s/p pulse SM, prior CMV reactivations, and subsequent grade 3 CARLOS EDUARDO; s/p redo BOLT 6/18/2019, CMV D-/R+, basiliximab induction, on maintenance tacro/MMF/pred; c/b  Pseudomonas and C.glabrata recipient derived post-transplant pneumonia s/p zerbaxa/micafungin course and invasive aspergillosis on chronic voriconazole) who was admitted on 8/26/2019 with 2 days of fevers, worsening SOB, productive cough, and hypoxic respiratory failure/septic shock due to MRSA pneumonia with associated septicemia (requiring pressors, intubation, and CRRT; associated with RIJ thrombus and port, s/p removal). She suffered a profound decompensation on 8/30 with shock requiring max doses of multiple pressors, reintubation with paralysis/pronation, possible anemia of blood loss, and acidosis culminating in PEA arrest requiring 1 round of CPR despite empiric broadening of coverage to include vanc, zerbaxa, tobramycin, vori/micafungin -- ultimately found to have large RA, SVC, and RIJ thrombus as precipitant for hemodynamic instability on imaging. She remains critically ill with some hemodynamic improvement on vancomycin and anticoagulation.    - would continue vancomycin (goal trough 15-20) for MRSA septicemia, infected  thrombus, and pneumonia  - would continue voriconazole for prior invasive aspergillosis; await BAL antigen from recent bronch; repeat level  - may continue micafungin while awaiting yeast ID   - remainder of prophylaxis per protocol  - care of diffuse RA/SVC thrombus deferred to lung transplant service  - repeat cultures to assess for clearance ordered today  - contact precautions for MRSA        Anticipated Disposition: pending improvement    Thank you for your consult. I will follow-up with patient. Please contact us if you have any additional questions.     Juanita Francis MD  Transplant ID Attending  [phone]    Juanita Francis MD  Infectious Disease  [facility]    Subjective:     Principal Problem:Septic shock    HPI: No notes on file  Interval History: Hemodynamics improving but degree of support with pressors, MV, and CRRT remains profound. Antibiotics tailored as directed. Afebrile. WBC improving. Repeat echo pending.    Review of Systems   Unable to perform ROS: Intubated     Objective:     Vital Signs (Most Recent):  Temp: 99.1 °F (37.3 °C) (09/02/19 1500)  Pulse: (!) 119 (09/02/19 1600)  Resp: (!) 25 (09/02/19 1524)  BP: (!) 84/59 (09/02/19 1600)  SpO2: (!) 94 % (09/02/19 1600) Vital Signs (24h Range):  Temp:  [95 °F (35 °C)-99.1 °F (37.3 °C)] 99.1 °F (37.3 °C)  Pulse:  [] 119  Resp:  [25-35] 25  SpO2:  [83 %-99 %] 94 %  BP: ()/(51-69) 84/59  Arterial Line BP: (105-168)/() 168/111     Weight: 67.6 kg (149 lb 0.5 oz)  Body mass index is 29.11 kg/m².    Estimated Creatinine Clearance: 47 mL/min (A) (based on SCr of 1.5 mg/dL (H)).    Physical Exam   Constitutional: She appears well-developed. She has a sickly appearance. She appears distressed.   Intubated, sedated, paralyzed, on pressors, CRRT.   HENT:   Head: Normocephalic.   Neck: No tracheal deviation present.   Cardiovascular: Regular rhythm. Tachycardia present.   No murmur heard.  Pulmonary/Chest: She is in  respiratory distress. She has decreased breath sounds in the right lower field. She has no wheezes. She has rhonchi in the right upper field, the right middle field, the left upper field and the left middle field. She has no rales.   Intubated, paralyzed.   Abdominal: She exhibits no distension. There is no tenderness.   Genitourinary:   Genitourinary Comments: saleh   Musculoskeletal: She exhibits edema. She exhibits no deformity.   Neurological:   Paralyzed, sedated.   Skin: Skin is warm and dry. Rash noted. There is pallor.   Mottling most prominent in RLE.   Nursing note and vitals reviewed.      Significant Labs:   CBC:   Recent Labs   Lab 09/01/19 1945 09/02/19 0355 09/02/19  0754   WBC 25.19* 24.14* 21.60*   HGB 10.5* 10.0* 9.8*   HCT 32.3* 32.3* 30.6*   PLT 22* 24* 22*     CMP:   Recent Labs   Lab 09/01/19  0800  09/01/19 1945 09/01/19  2340 09/02/19  0355 09/02/19  0754 09/02/19  1357   NA  --    < >  --  129* 129*  --  128*   K  --    < >  --  4.9 5.2*  --  5.5*   CL  --    < >  --  96 94*  --  94*   CO2  --    < >  --  16* 16*  --  19*   GLU  --    < >  --  178* 172*  --  144*   BUN  --    < >  --  17 18  --  20   CREATININE  --    < >  --  1.4 1.5*  --  1.5*   CALCIUM  --    < >  --  6.5* 6.6*  --  6.3*   PROT 3.8*  --  4.0*  --   --  4.1*  --    ALBUMIN 1.2*   < > 1.1* 1.0* 1.1* 1.1* 1.0*   BILITOT 0.7  --  0.9  --   --  1.0  --    ALKPHOS 246*  --  409*  --   --  567*  --    AST 1,373*  --  1,247*  --   --  1,015*  --    *  --  442*  --   --  432*  --    ANIONGAP  --    < >  --  17* 19*  --  15   EGFRNONAA  --    < >  --  50.5* 46.4*  --  46.4*    < > = values in this interval not displayed.       Significant Imaging: I have reviewed all pertinent imaging results/findings within the past 24 hours.

## 2019-09-02 NOTE — SUBJECTIVE & OBJECTIVE
Subjective:     Interval History: No acute events overnight. Remains intubated and sedated. Oxygenation improved today and FiO2 was decreased to 50%. Remains anuric on CRRT. Pressor requirements have decreased and is now off angiotension II.     Continuous Infusions:   sodium chloride 0.9% 200 mL/hr at 09/02/19 0800    fentanyl 175 mcg/hr (09/02/19 1200)    heparin (porcine) in 5 % dex 300 Units/hr (09/02/19 1200)    midazolam (VERSED) infusion (non-titrating) 3 mg/hr (09/02/19 1200)    norepinephrine bitartrate-D5W 0.48 mcg/kg/min (09/02/19 1200)    vasopressin (PITRESSIN) infusion 0.04 Units/min (09/02/19 1211)     Scheduled Meds:   carboxymethylcellulose sodium   Both Eyes TID    chlorhexidine  15 mL Mouth/Throat BID    hydrocortisone sodium succinate  50 mg Intravenous Q6H    levalbuterol  1.25 mg Nebulization Q8H    micafungin (MYCAMINE) IVPB  100 mg Intravenous Q24H    pantoprazole  40 mg Intravenous Daily    sodium chloride 0.9%  1,000 mL Intravenous Once    sodium chloride 0.9%  500 mL Intravenous Once    vorconazole (VFEND) IVPB  4 mg/kg Intravenous Q12H     PRN Meds:dextrose 50%, diphenhydrAMINE, diphenhydrAMINE, glucagon (human recombinant), HYDROmorphone, insulin aspart U-100, LORazepam, magnesium sulfate IVPB, metoprolol, ondansetron, oxyCODONE, promethazine (PHENERGAN) IVPB, sodium phosphate IVPB, sodium phosphate IVPB, sodium phosphate IVPB    Review of patient's allergies indicates:   Allergen Reactions    Albuterol Palpitations    Colistin Anaphylaxis    Vancomycin analogues      Infusion reaction that does not resolve with slowing  Pt. States she can tolerate it when given with 50 mg Benadryl and ran over 3 hours    Neupogen [filgrastim] Other (See Comments)     Ostealgia after five daily doses of 300 mcg.      Bactrim [sulfamethoxazole-trimethoprim] Hives    Ceftazidime Hives     Pt stated can tolerate cefapine not ceftazidime    Ceftazidime     Dronabinol Other (See  Comments)     Mental changes/hallucinations    Haldol [haloperidol lactate] Other (See Comments)     Seizure like activity    Nsaids (non-steroidal anti-inflammatory drug)      Cannot have due to lung transplant    Adhesive Rash     Cloth tape- please use tegaderm or paper tape    Aztreonam Rash    Ciprofloxacin Nausea And Vomiting     Projectile N/V, per patient.  Unwilling to retry therapy.       Review of Systems   Unable to perform ROS: Intubated     Objective:   Physical Exam   Constitutional: She appears well-developed and well-nourished. She appears toxic. She is sedated and intubated.   HENT:   Head: Normocephalic and atraumatic.   ET and OG tubes in place   Eyes: Right eye exhibits no discharge. Left eye exhibits no discharge. No scleral icterus.   Neck: Neck supple.   Cardiovascular: Regular rhythm. Tachycardia present.   Pulmonary/Chest: She is intubated. No respiratory distress. She has no wheezes. She has rales.   Mechanical breath sounds   Abdominal: Soft. Bowel sounds are normal.   Musculoskeletal: She exhibits edema (generalized). She exhibits no tenderness or deformity.   Skin: There is pallor.   Bilateral lower extremity mottling (right leg extends to mid-thigh, well demarcated), bilateral hands and digits mottled, cool to touch   Nursing note and vitals reviewed.        Vital Signs (Most Recent):  Temp: 98.8 °F (37.1 °C) (09/02/19 1100)  Pulse: (!) 118 (09/02/19 1200)  Resp: (!) 35 (09/02/19 0728)  BP: 116/65 (09/02/19 1200)  SpO2: (!) 94 % (09/02/19 1200) Vital Signs (24h Range):  Temp:  [95 °F (35 °C)-98.8 °F (37.1 °C)] 98.8 °F (37.1 °C)  Pulse:  [] 118  Resp:  [35] 35  SpO2:  [82 %-99 %] 94 %  BP: ()/(50-66) 116/65  Arterial Line BP: (107-154)/(41-67) 107/45     Weight: 67.6 kg (149 lb 0.5 oz)  Body mass index is 29.11 kg/m².      Intake/Output Summary (Last 24 hours) at 9/2/2019 1240  Last data filed at 9/2/2019 1200  Gross per 24 hour   Intake 5226.99 ml   Output 4432 ml    Net 794.99 ml       Ventilator Data:     Vent Mode: A/C  Oxygen Concentration (%):  [39-99] 39  Resp Rate Total:  [25 br/min-35 br/min] 25 br/min  Vt Set:  [320 mL] 320 mL  PEEP/CPAP:  [5 cmH20] 5 cmH20  Mean Airway Pressure:  [13 gtI89-76 cmH20] 13 cmH20    Hemodynamic Parameters:       Lines/Drains:       Hemodialysis Catheter 08/31/19 1330 right femoral (Active)   Site Assessment Clean;Dry;Intact;No redness;No swelling 9/1/2019 11:15 AM   Status Accessed 9/1/2019 11:15 AM   Flows Good 9/1/2019  3:00 AM   Dressing Intervention New dressing 8/31/2019  3:15 PM   Dressing Status Biopatch in place;Clean;Dry;Intact 9/1/2019 11:15 AM   Dressing Change Due 09/07/19 9/1/2019  3:00 AM   Site Condition No complications 9/1/2019 11:15 AM   Dressing Occlusive 9/1/2019  3:00 AM   Daily Line Review Performed 9/1/2019  3:00 AM   Number of days: 0            Percutaneous Central Line Insertion/Assessment - triple lumen  08/26/19 1135 left internal jugular (Active)   Dressing biopatch in place;dressing dry and intact 9/1/2019 11:15 AM   Securement secured w/ sutures 9/1/2019 11:15 AM   Additional Site Signs no erythema;no warmth;no edema;no pain;no palpable cord;no streak formation;no drainage 9/1/2019 11:15 AM   Distal Patency/Care infusing 9/1/2019 11:15 AM   Medial Patency/Care infusing 9/1/2019 11:15 AM   Proximal Patency/Care infusing 9/1/2019 11:15 AM   Waveform normal 8/30/2019  3:15 PM   Line Interventions line leveled/zeroed 9/1/2019 11:15 AM   Dressing Change Due 09/02/19 9/1/2019 11:15 AM   Daily Line Review Performed 9/1/2019 11:15 AM   Number of days: 6            Peripheral IV - Single Lumen 08/26/19 1119 20 G Anterior;Right Forearm (Active)   Site Assessment Clean;Dry;Intact;No redness;No swelling 9/1/2019 11:15 AM   Line Status Infusing 9/1/2019 11:15 AM   Dressing Status Clean;Dry;Intact 9/1/2019 11:15 AM   Dressing Intervention Dressing reinforced 8/28/2019  3:00 PM   Dressing Change Due 08/30/19 9/1/2019  3:00  AM   Site Change Due 09/30/19 8/31/2019  7:00 PM   Reason Not Rotated Poor venous access 9/1/2019 11:15 AM   Number of days: 6            Peripheral IV - Single Lumen 08/30/19 1600 20 G Right Forearm (Active)   Site Assessment Clean;Dry;Intact;No redness;No swelling 9/1/2019 11:15 AM   Line Status Infusing 9/1/2019 11:15 AM   Dressing Status Clean;Dry;Intact 9/1/2019 11:15 AM   Dressing Change Due 09/03/19 9/1/2019 11:15 AM   Site Change Due 09/03/19 8/31/2019  7:00 PM   Reason Not Rotated Not due 9/1/2019 11:15 AM   Number of days: 1            Arterial Line 08/31/19 0120 Left Femoral (Active)   Site Assessment Clean;Dry;Intact;No redness;No swelling 9/1/2019 11:15 AM   Line Status Pulsatile blood flow 9/1/2019 11:15 AM   Art Line Waveform Appropriate 9/1/2019 11:15 AM   Arterial Line Interventions Zeroed and calibrated;Leveled 9/1/2019 11:15 AM   Color/Movement/Sensation Cool fingers/toes;Dusky fingers/toes 9/1/2019 11:15 AM   Dressing Type Transparent 9/1/2019 11:15 AM   Dressing Status Clean;Dry;Intact 9/1/2019 11:15 AM   Dressing Intervention New dressing 9/1/2019  3:00 AM   Dressing Change Due 09/03/19 9/1/2019  3:00 AM   Number of days: 1            NG/OG Tube 08/30/19 1000 Right nostril (Active)   Placement Check placement verified by x-ray 9/1/2019 11:15 AM   Tolerance no signs/symptoms of discomfort 9/1/2019 11:15 AM   Securement secured to nostril center w/ adhesive device 9/1/2019 11:15 AM   Clamp Status/Tolerance unclamped 9/1/2019 11:15 AM   Suction Setting/Drainage Method suction at;low;intermittent setting 9/1/2019 11:15 AM   Insertion Site Appearance no redness, warmth, tenderness, skin breakdown, drainage 9/1/2019 11:15 AM   Drainage Bile;Green;Brown 9/1/2019 11:15 AM   Flush/Irrigation flushed w/;water;no resistance met 9/1/2019 11:15 AM   Intake (mL) 60 mL 8/31/2019 12:00 PM   Tube Output(mL)(Include Discarded Residual) 50 mL 9/1/2019  5:00 AM   Number of days: 2       Significant  Labs:  CBC:  Recent Labs   Lab 09/02/19  0754   WBC 21.60*   RBC 3.48*   HGB 9.8*   HCT 30.6*   PLT 22*   MCV 88   MCH 28.2   MCHC 32.0     BMP:  Recent Labs   Lab 09/02/19  0355   *   K 5.2*   CL 94*   CO2 16*   BUN 18   CREATININE 1.5*   CALCIUM 6.6*      Tacrolimus Levels:  Recent Labs   Lab 09/01/19  0436   TACROLIMUS 1.9*     Microbiology:  Microbiology Results (last 7 days)     Procedure Component Value Units Date/Time    Blood culture [[id number]] Collected:  09/02/19 1018    Order Status:  Sent Specimen:  Blood from Peripheral, Forearm, Right Updated:  09/02/19 1132    Blood culture [[id number]] Collected:  09/02/19 1128    Order Status:  Sent Specimen:  Blood from Line, Femoral, Left Updated:  09/02/19 1129    Blood culture [[id number]]  (Abnormal) Collected:  08/30/19 1450    Order Status:  Completed Specimen:  Blood from Peripheral, Hand, Left Updated:  09/02/19 0847     Blood Culture, Routine Gram stain lisa bottle: Gram positive cocci       Results called to and read back by:Torri Murdock RN 08/31/2019  16:23      METHICILLIN RESISTANT STAPHYLOCOCCUS AUREUS  ID consult required at [facility] and [facility].  For susceptibility see order #[id number]      Blood culture [[id number]] Collected:  08/30/19 1445    Order Status:  Completed Specimen:  Blood from Peripheral, Forearm, Right Updated:  09/01/19 1612     Blood Culture, Routine No Growth to date      No Growth to date      No Growth to date    Blood culture [[id number]] Collected:  08/28/19 1250    Order Status:  Completed Specimen:  Blood from Peripheral, Hand, Right Updated:  09/01/19 1612     Blood Culture, Routine No Growth to date      No Growth to date      No Growth to date      No Growth to date      No Growth to date    Fungus culture [[id number]]  (Abnormal) Collected:  08/26/19 1441    Order Status:  Completed Specimen:  Respiratory from Bronchial Wash Updated:  09/01/19 1236     Fungus (Mycology) Culture  BRANDI GLABRATA  Many      Narrative:       Bronchial Wash    Blood culture [[id number]]  (Abnormal)  (Susceptibility) Collected:  08/28/19 1020    Order Status:  Completed Specimen:  Blood from Peripheral, Wrist, Right Updated:  08/31/19 1043     Blood Culture, Routine Gram stain aer bottle: Gram positive cocci in clusters resembling Staph      Results called to and read back by:Lashanda Rios RN 08/29/2019  17:38      METHICILLIN RESISTANT STAPHYLOCOCCUS AUREUS  ID consult required at [facility].      Blood culture #1 **CANNOT BE ORDERED STAT** [[id number]]  (Abnormal) Collected:  08/26/19 0848    Order Status:  Completed Specimen:  Blood from Line, Port A Cath Updated:  08/29/19 1111     Blood Culture, Routine Gram stain lisa bottle: Gram positive cocci in clusters resembling Staph       Results called to and read back by:Monet Martinez RN 08/27/2019  06:04      Gram stain aer bottle: Gram positive cocci in clusters resembling Staph       08/28/2019  10:29      METHICILLIN RESISTANT STAPHYLOCOCCUS AUREUS  ID consult required at [facility].  For susceptibility see order #[id number]      Blood culture #2 **CANNOT BE ORDERED STAT** [[id number]]  (Abnormal)  (Susceptibility) Collected:  08/26/19 0859    Order Status:  Completed Specimen:  Blood from Wrist, Right Updated:  08/29/19 1110     Blood Culture, Routine Gram stain aer bottle: Gram positive cocci in clusters resembling Staph       Results called to and read back by:Monet Martinez RN 08/27/2019 03:23      Gram stain lisa bottle: Gram positive cocci in clusters resembling Staph       08/27/2019  11:38      METHICILLIN RESISTANT STAPHYLOCOCCUS AUREUS  ID consult required at [facility].      Culture, Respiratory [[id number]]  (Abnormal)  (Susceptibility) Collected:  08/26/19 1441    Order Status:  Completed Specimen:  Respiratory from  Bronchial Wash Updated:  08/28/19 1213     Respiratory Culture METHICILLIN RESISTANT STAPHYLOCOCCUS AUREUS  Many       Gram Stain (Respiratory) <10 epithelial cells per low power field.     Gram Stain (Respiratory) Few WBC's     Gram Stain (Respiratory) Many Gram positive cocci    Narrative:       Bronchial Wash    AFB Culture & Smear [[id number]] Collected:  08/26/19 1441    Order Status:  Completed Specimen:  Respiratory from Bronchial Wash Updated:  08/27/19 2127     AFB Culture & Smear Culture in progress     AFB CULTURE STAIN No acid fast bacilli seen.    Narrative:       Bronchial Wash    Respiratory Infection Panel, Nasopharyngeal [[id number]] Collected:  08/26/19 1003    Order Status:  Completed Specimen:  Nasopharyngeal Swab Updated:  08/26/19 1344     Respiratory Infection Panel Source NP Swab     Adenovirus Not Detected     Coronavirus 229E Not Detected     Coronavirus HKU1 Not Detected     Coronavirus NL63 Not Detected     Coronavirus OC43 Not Detected     Human Metapneumovirus Not Detected     Human Rhinovirus/Enterovirus Not Detected     Influenza A (subtypes H1, H1-2009,H3) Not Detected     Influenza B Not Detected     Parainfluenza Virus 1 Not Detected     Parainfluenza Virus 2 Not Detected     Parainfluenza Virus 3 Not Detected     Parainfluenza Virus 4 Not Detected     Respiratory Syncytial Virus Not Detected     Bordetella Parapertussis (CH0095) Not Detected     Bordetella pertussis (ptxP) Not Detected     Chlamydia pneumoniae Not Detected     Mycoplasma pneumoniae Not Detected    Narrative:       For all other respiratory sources order [id number] Respiratory  Viral Panel by PCR (RVPCR)          I have reviewed all pertinent labs within the past 24 hours.

## 2019-09-02 NOTE — PLAN OF CARE
Problem: Adult Inpatient Plan of Care  Goal: Plan of Care Review  Outcome: Ongoing (interventions implemented as appropriate)  No acute events during shift. HR 110s-120s, extremities mottled, upper extremities dopplarable, lower extremities not dopplarable, Dr. Alvarez aware, vascular consulted. O2 >90% on vent, AC VC, 40%, 5 PEEP. Gtts: Levo @ 0.45 mcg/kg/m, Vaso @ 0.04 u/m, Fentanyl @ 175 mcg/h, Versed @ 3 mg/h.  Labs trended: Ca replaced. CRRT: SLED, UF @ 300, heparin @ 300 u/h prefilter. See flowsheets for intake and output, vital signs, and assessments. POC reviewed with family. Will continue to monitor.

## 2019-09-02 NOTE — ASSESSMENT & PLAN NOTE
Multifactorial given MRSA bacteremia and PNA. History of recurrent  pseudomonal infections and MRSA. Patient s/p PEA arrest 8/31.  Continue Vancomycin, Micafungin, VFend, and Zerbaxa.  Currently on multiple pressors to maintain MAPs >65. Wean pressors as tolerated.  Remains on ventilator, will continue supportive care.  Prognosis remains guarded.

## 2019-09-02 NOTE — PROGRESS NOTES
[facility]  Lung Transplant  Progress Note - Critical Care    Patient Name: Juanita Ibarra  MRN: [id number]  Admission Date: 8/26/2019  Hospital Length of Stay: 7 days  Post-Operative Day: 1908 (Lung), 76 (Lung)  Attending Physician: Jake Alvarez MD  Primary Care Provider: Primary Doctor No     Subjective:     Interval History: No acute events overnight. Remains intubated and sedated. Oxygenation improved today and FiO2 was decreased to 50%. Remains anuric on CRRT. Pressor requirements have decreased and is now off angiotension II.     Continuous Infusions:   sodium chloride 0.9% 200 mL/hr at 09/02/19 0800    fentanyl 175 mcg/hr (09/02/19 1200)    heparin (porcine) in 5 % dex 300 Units/hr (09/02/19 1200)    midazolam (VERSED) infusion (non-titrating) 3 mg/hr (09/02/19 1200)    norepinephrine bitartrate-D5W 0.48 mcg/kg/min (09/02/19 1200)    vasopressin (PITRESSIN) infusion 0.04 Units/min (09/02/19 1211)     Scheduled Meds:   carboxymethylcellulose sodium   Both Eyes TID    chlorhexidine  15 mL Mouth/Throat BID    hydrocortisone sodium succinate  50 mg Intravenous Q6H    levalbuterol  1.25 mg Nebulization Q8H    micafungin (MYCAMINE) IVPB  100 mg Intravenous Q24H    pantoprazole  40 mg Intravenous Daily    sodium chloride 0.9%  1,000 mL Intravenous Once    sodium chloride 0.9%  500 mL Intravenous Once    vorconazole (VFEND) IVPB  4 mg/kg Intravenous Q12H     PRN Meds:dextrose 50%, diphenhydrAMINE, diphenhydrAMINE, glucagon (human recombinant), HYDROmorphone, insulin aspart U-100, LORazepam, magnesium sulfate IVPB, metoprolol, ondansetron, oxyCODONE, promethazine (PHENERGAN) IVPB, sodium phosphate IVPB, sodium phosphate IVPB, sodium phosphate IVPB    Review of patient's allergies indicates:   Allergen Reactions    Albuterol Palpitations    Colistin Anaphylaxis    Vancomycin analogues      Infusion reaction that does not resolve with slowing  Pt. States she can  tolerate it when given with 50 mg Benadryl and ran over 3 hours    Neupogen [filgrastim] Other (See Comments)     Ostealgia after five daily doses of 300 mcg.      Bactrim [sulfamethoxazole-trimethoprim] Hives    Ceftazidime Hives     Pt stated can tolerate cefapine not ceftazidime    Ceftazidime     Dronabinol Other (See Comments)     Mental changes/hallucinations    Haldol [haloperidol lactate] Other (See Comments)     Seizure like activity    Nsaids (non-steroidal anti-inflammatory drug)      Cannot have due to lung transplant    Adhesive Rash     Cloth tape- please use tegaderm or paper tape    Aztreonam Rash    Ciprofloxacin Nausea And Vomiting     Projectile N/V, per patient.  Unwilling to retry therapy.       Review of Systems   Unable to perform ROS: Intubated     Objective:   Physical Exam   Constitutional: She appears well-developed and well-nourished. She appears toxic. She is sedated and intubated.   HENT:   Head: Normocephalic and atraumatic.   ET and OG tubes in place   Eyes: Right eye exhibits no discharge. Left eye exhibits no discharge. No scleral icterus.   Neck: Neck supple.   Cardiovascular: Regular rhythm. Tachycardia present.   Pulmonary/Chest: She is intubated. No respiratory distress. She has no wheezes. She has rales.   Mechanical breath sounds   Abdominal: Soft. Bowel sounds are normal.   Musculoskeletal: She exhibits edema (generalized). She exhibits no tenderness or deformity.   Skin: There is pallor.   Bilateral lower extremity mottling (right leg extends to mid-thigh, well demarcated), bilateral hands and digits mottled, cool to touch   Nursing note and vitals reviewed.        Vital Signs (Most Recent):  Temp: 98.8 °F (37.1 °C) (09/02/19 1100)  Pulse: (!) 118 (09/02/19 1200)  Resp: (!) 35 (09/02/19 0728)  BP: 116/65 (09/02/19 1200)  SpO2: (!) 94 % (09/02/19 1200) Vital Signs (24h Range):  Temp:  [95 °F (35 °C)-98.8 °F (37.1 °C)] 98.8 °F (37.1 °C)  Pulse:  [] 118  Resp:   [35] 35  SpO2:  [82 %-99 %] 94 %  BP: ()/(50-66) 116/65  Arterial Line BP: (107-154)/(41-67) 107/45     Weight: 67.6 kg (149 lb 0.5 oz)  Body mass index is 29.11 kg/m².      Intake/Output Summary (Last 24 hours) at 9/2/2019 1240  Last data filed at 9/2/2019 1200  Gross per 24 hour   Intake 5226.99 ml   Output 4432 ml   Net 794.99 ml       Ventilator Data:     Vent Mode: A/C  Oxygen Concentration (%):  [39-99] 39  Resp Rate Total:  [25 br/min-35 br/min] 25 br/min  Vt Set:  [320 mL] 320 mL  PEEP/CPAP:  [5 cmH20] 5 cmH20  Mean Airway Pressure:  [13 gsF89-77 cmH20] 13 cmH20    Hemodynamic Parameters:       Lines/Drains:       Hemodialysis Catheter 08/31/19 1330 right femoral (Active)   Site Assessment Clean;Dry;Intact;No redness;No swelling 9/1/2019 11:15 AM   Status Accessed 9/1/2019 11:15 AM   Flows Good 9/1/2019  3:00 AM   Dressing Intervention New dressing 8/31/2019  3:15 PM   Dressing Status Biopatch in place;Clean;Dry;Intact 9/1/2019 11:15 AM   Dressing Change Due 09/07/19 9/1/2019  3:00 AM   Site Condition No complications 9/1/2019 11:15 AM   Dressing Occlusive 9/1/2019  3:00 AM   Daily Line Review Performed 9/1/2019  3:00 AM   Number of days: 0            Percutaneous Central Line Insertion/Assessment - triple lumen  08/26/19 1135 left internal jugular (Active)   Dressing biopatch in place;dressing dry and intact 9/1/2019 11:15 AM   Securement secured w/ sutures 9/1/2019 11:15 AM   Additional Site Signs no erythema;no warmth;no edema;no pain;no palpable cord;no streak formation;no drainage 9/1/2019 11:15 AM   Distal Patency/Care infusing 9/1/2019 11:15 AM   Medial Patency/Care infusing 9/1/2019 11:15 AM   Proximal Patency/Care infusing 9/1/2019 11:15 AM   Waveform normal 8/30/2019  3:15 PM   Line Interventions line leveled/zeroed 9/1/2019 11:15 AM   Dressing Change Due 09/02/19 9/1/2019 11:15 AM   Daily Line Review Performed 9/1/2019 11:15 AM   Number of days: 6            Peripheral IV - Single Lumen  08/26/19 1119 20 G Anterior;Right Forearm (Active)   Site Assessment Clean;Dry;Intact;No redness;No swelling 9/1/2019 11:15 AM   Line Status Infusing 9/1/2019 11:15 AM   Dressing Status Clean;Dry;Intact 9/1/2019 11:15 AM   Dressing Intervention Dressing reinforced 8/28/2019  3:00 PM   Dressing Change Due 08/30/19 9/1/2019  3:00 AM   Site Change Due 09/30/19 8/31/2019  7:00 PM   Reason Not Rotated Poor venous access 9/1/2019 11:15 AM   Number of days: 6            Peripheral IV - Single Lumen 08/30/19 1600 20 G Right Forearm (Active)   Site Assessment Clean;Dry;Intact;No redness;No swelling 9/1/2019 11:15 AM   Line Status Infusing 9/1/2019 11:15 AM   Dressing Status Clean;Dry;Intact 9/1/2019 11:15 AM   Dressing Change Due 09/03/19 9/1/2019 11:15 AM   Site Change Due 09/03/19 8/31/2019  7:00 PM   Reason Not Rotated Not due 9/1/2019 11:15 AM   Number of days: 1            Arterial Line 08/31/19 0120 Left Femoral (Active)   Site Assessment Clean;Dry;Intact;No redness;No swelling 9/1/2019 11:15 AM   Line Status Pulsatile blood flow 9/1/2019 11:15 AM   Art Line Waveform Appropriate 9/1/2019 11:15 AM   Arterial Line Interventions Zeroed and calibrated;Leveled 9/1/2019 11:15 AM   Color/Movement/Sensation Cool fingers/toes;Dusky fingers/toes 9/1/2019 11:15 AM   Dressing Type Transparent 9/1/2019 11:15 AM   Dressing Status Clean;Dry;Intact 9/1/2019 11:15 AM   Dressing Intervention New dressing 9/1/2019  3:00 AM   Dressing Change Due 09/03/19 9/1/2019  3:00 AM   Number of days: 1            NG/OG Tube 08/30/19 1000 Right nostril (Active)   Placement Check placement verified by x-ray 9/1/2019 11:15 AM   Tolerance no signs/symptoms of discomfort 9/1/2019 11:15 AM   Securement secured to nostril center w/ adhesive device 9/1/2019 11:15 AM   Clamp Status/Tolerance unclamped 9/1/2019 11:15 AM   Suction Setting/Drainage Method suction at;low;intermittent setting 9/1/2019 11:15 AM   Insertion Site Appearance no redness, warmth,  tenderness, skin breakdown, drainage 9/1/2019 11:15 AM   Drainage Bile;Green;Brown 9/1/2019 11:15 AM   Flush/Irrigation flushed w/;water;no resistance met 9/1/2019 11:15 AM   Intake (mL) 60 mL 8/31/2019 12:00 PM   Tube Output(mL)(Include Discarded Residual) 50 mL 9/1/2019  5:00 AM   Number of days: 2       Significant Labs:  CBC:  Recent Labs   Lab 09/02/19  0754   WBC 21.60*   RBC 3.48*   HGB 9.8*   HCT 30.6*   PLT 22*   MCV 88   MCH 28.2   MCHC 32.0     BMP:  Recent Labs   Lab 09/02/19  0355   *   K 5.2*   CL 94*   CO2 16*   BUN 18   CREATININE 1.5*   CALCIUM 6.6*      Tacrolimus Levels:  Recent Labs   Lab 09/01/19  0436   TACROLIMUS 1.9*     Microbiology:  Microbiology Results (last 7 days)     Procedure Component Value Units Date/Time    Blood culture [[id number]] Collected:  09/02/19 1018    Order Status:  Sent Specimen:  Blood from Peripheral, Forearm, Right Updated:  09/02/19 1132    Blood culture [[id number]] Collected:  09/02/19 1128    Order Status:  Sent Specimen:  Blood from Line, Femoral, Left Updated:  09/02/19 1129    Blood culture [[id number]]  (Abnormal) Collected:  08/30/19 1450    Order Status:  Completed Specimen:  Blood from Peripheral, Hand, Left Updated:  09/02/19 0847     Blood Culture, Routine Gram stain lisa bottle: Gram positive cocci       Results called to and read back by:Torri Murdock RN 08/31/2019  16:23      METHICILLIN RESISTANT STAPHYLOCOCCUS AUREUS  ID consult required at [facility] and [facility].  For susceptibility see order #[id number]      Blood culture [[id number]] Collected:  08/30/19 1445    Order Status:  Completed Specimen:  Blood from Peripheral, Forearm, Right Updated:  09/01/19 1612     Blood Culture, Routine No Growth to date      No Growth to date      No Growth to date    Blood culture [[id number]] Collected:  08/28/19 1250    Order Status:  Completed Specimen:  Blood from Peripheral, Hand, Right Updated:  09/01/19 1612     Blood  Culture, Routine No Growth to date      No Growth to date      No Growth to date      No Growth to date      No Growth to date    Fungus culture [[id number]]  (Abnormal) Collected:  08/26/19 1441    Order Status:  Completed Specimen:  Respiratory from Bronchial Wash Updated:  09/01/19 1236     Fungus (Mycology) Culture BRANDI GLABRATA  Many      Narrative:       Bronchial Wash    Blood culture [[id number]]  (Abnormal)  (Susceptibility) Collected:  08/28/19 1020    Order Status:  Completed Specimen:  Blood from Peripheral, Wrist, Right Updated:  08/31/19 1043     Blood Culture, Routine Gram stain aer bottle: Gram positive cocci in clusters resembling Staph      Results called to and read back by:Lashanda Rios RN 08/29/2019  17:38      METHICILLIN RESISTANT STAPHYLOCOCCUS AUREUS  ID consult required at [facility].      Blood culture #1 **CANNOT BE ORDERED STAT** [[id number]]  (Abnormal) Collected:  08/26/19 0848    Order Status:  Completed Specimen:  Blood from Line, Port A Cath Updated:  08/29/19 1111     Blood Culture, Routine Gram stain lisa bottle: Gram positive cocci in clusters resembling Staph       Results called to and read back by:Monet Martinez RN 08/27/2019  06:04      Gram stain aer bottle: Gram positive cocci in clusters resembling Staph       08/28/2019  10:29      METHICILLIN RESISTANT STAPHYLOCOCCUS AUREUS  ID consult required at [facility] and [facility].  For susceptibility see order #[id number]      Blood culture #2 **CANNOT BE ORDERED STAT** [[id number]]  (Abnormal)  (Susceptibility) Collected:  08/26/19 0859    Order Status:  Completed Specimen:  Blood from Wrist, Right Updated:  08/29/19 1110     Blood Culture, Routine Gram stain aer bottle: Gram positive cocci in clusters resembling Staph       Results called to and read back by:Monet Martinez RN 08/27/2019 03:23      Gram stain lisa bottle: Gram positive cocci in clusters  resembling Staph       08/27/2019  11:38      METHICILLIN RESISTANT STAPHYLOCOCCUS AUREUS  ID consult required at [facility],[place],[facility] and [facility]   locations.      Culture, Respiratory [[id number]]  (Abnormal)  (Susceptibility) Collected:  08/26/19 1441    Order Status:  Completed Specimen:  Respiratory from Bronchial Wash Updated:  08/28/19 1213     Respiratory Culture METHICILLIN RESISTANT STAPHYLOCOCCUS AUREUS  Many       Gram Stain (Respiratory) <10 epithelial cells per low power field.     Gram Stain (Respiratory) Few WBC's     Gram Stain (Respiratory) Many Gram positive cocci    Narrative:       Bronchial Wash    AFB Culture & Smear [[id number]] Collected:  08/26/19 1441    Order Status:  Completed Specimen:  Respiratory from Bronchial Wash Updated:  08/27/19 2127     AFB Culture & Smear Culture in progress     AFB CULTURE STAIN No acid fast bacilli seen.    Narrative:       Bronchial Wash    Respiratory Infection Panel, Nasopharyngeal [[id number]] Collected:  08/26/19 1003    Order Status:  Completed Specimen:  Nasopharyngeal Swab Updated:  08/26/19 1344     Respiratory Infection Panel Source NP Swab     Adenovirus Not Detected     Coronavirus 229E Not Detected     Coronavirus HKU1 Not Detected     Coronavirus NL63 Not Detected     Coronavirus OC43 Not Detected     Human Metapneumovirus Not Detected     Human Rhinovirus/Enterovirus Not Detected     Influenza A (subtypes H1, H1-2009,H3) Not Detected     Influenza B Not Detected     Parainfluenza Virus 1 Not Detected     Parainfluenza Virus 2 Not Detected     Parainfluenza Virus 3 Not Detected     Parainfluenza Virus 4 Not Detected     Respiratory Syncytial Virus Not Detected     Bordetella Parapertussis (KQ6281) Not Detected     Bordetella pertussis (ptxP) Not Detected     Chlamydia pneumoniae Not Detected     Mycoplasma pneumoniae Not Detected    Narrative:       For all other respiratory sources order [id number] Respiratory  Viral Panel by PCR (RVPCR)           I have reviewed all pertinent labs within the past 24 hours.            Assessment/Plan:     * Septic shock  Multifactorial given MRSA bacteremia and PNA. History of recurrent  pseudomonal infections and MRSA. Patient s/p PEA arrest 8/31.  Continue Vancomycin, Micafungin, VFend, and Zerbaxa.  Currently on multiple pressors to maintain MAPs >65. Wean pressors as tolerated.  Remains on ventilator, will continue supportive care.  Prognosis remains guarded.     S/P lung transplant  Underwent bilateral re-transplant for CARLOS EDUARDO on 6/18/19. Continue immunosuppression and prophylaxis.     Immunosuppression  Continue prednisone. Holding tacrolimus in setting of LISBETH. Daily tacrolimus levels.     Prophylactic antibiotic  Continue voriconazole. Holding dapsone and valcyte    Chronic pain with opiate use  Continue fentanyl and versed infusions.    CF related Pancreatic insufficiency  Continue NPO.     LISBETH (acute kidney injury)  Nephrology following appreciate recs.     Acute respiratory failure  Reintubated 8/30 evening. Continue current vent settings, daily CXR/ABG.     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    Bacteremia  Blood cultures from 8/26 with MRSA. Right chest port removed. Repeat blood cultures NGTD. ID following, appreciate recs. Continue vancomycin, Zerbaxa, and Micafungin.     Arterial occlusion, lower extremity  Bilateral lower and upper extremities with severe mottling. Vascular surgery following, appreciate recs.      Preventive Measures: Nutrition: Goal: NPO, Stress Ulcer: continue prophyllaxis, Head of Bet: elevated, Reposition: per unit routine    Counseling/Consultation:I discussed the case with Dr. Alvarez.      Shama Canales PA-C  Lung Transplant  [facility]

## 2019-09-02 NOTE — PLAN OF CARE
Problem: Adult Inpatient Plan of Care  Goal: Plan of Care Review  Outcome: Ongoing (interventions implemented as appropriate)  POC reviewed with pt and spouse at bedside, intubated on A/C 60% and PEEP 5, ABG q4h, sats >92%, xray obtained this am, sedated on Versed and Fentanyl, unarousable, pupils 3 mm and reactive. On Levo @ 0.5 mcg/kg/min and Vaso @ 0.04 Units/min to maintain MAP>60. CRRT with , tolerating well, Heparin @ 300 Units/hr pre-filter. Afebrile overnight, -126, CVP 17, 16 and 16. BLE and hands remain purple, mottled and cold, able to doppler popliteal on R, DP on L, radial pulse on R and ulnar on L, warm packs on at all times, changed q2h, Levo weaned as tolerated. Pt with generalized swelling, elevated on pillows, passive range of motion performed. All questions answered and addressed, will continue with weaning down BP meds and supportive care.

## 2019-09-02 NOTE — ASSESSMENT & PLAN NOTE
28yo F s/p bilateral lung txp x 2, most recently June 2018 now with MRSA bacteremia, on CRRT, requiring multiple high dose vasopressors and s/p PEA arrest on 8/31 for whom vascular was consulted in the setting of cold, pulseless right lower extremity. Vascular u/s without occlusion.     - Vascular u/s did not reveal anything actionable. Changes likely related to high dose vasopressors/critical condition  - Continue to monitor progression of skin changes to lower extremities, watch for wet gangrene  -  still on multiple high dose vasopressors this may continue to progress   - once the patient is stable and extubated we can re-assess for debridement after demarcation, this would be a morbid procedure and would need plastics involved  - She is currently too unstable for any surigical intervention but vascular will continue to follow peripherally and be available for intervention when indicated.   - Call with questions/concerns

## 2019-09-02 NOTE — PROGRESS NOTES
DIALYSIS NOTE  Patient evaluated while undergoing Slow Low Efficiency Dialysis (SLED) indicated for LISBETH and hemodynamic instability. No complications, tolerating session with current UFR. Will continue current support.

## 2019-09-02 NOTE — SUBJECTIVE & OBJECTIVE
Medications Prior to Admission   Medication Sig Dispense Refill Last Dose    blood sugar diagnostic Strp Preferred by insurance. Testing BG 4 times daily. 120 strip 10 8/25/2019    blood-glucose meter Misc Use to check blood glucose 4 (four) times daily. 1 each 1 8/25/2019    calcium-vitamin D3 (OS-KATY 500 + D3) 500 mg(1,250mg) -200 unit per tablet Take 1 tablet by mouth 2 (two) times daily with meals. 60 tablet 11 8/25/2019    dapsone 100 MG Tab Take 1 tablet (100 mg total) by mouth once daily. 30 tablet 11 8/25/2019    diphenhydrAMINE (BENADRYL) 50 MG tablet Take 1 tablet (50 mg total) by mouth every 6 (six) hours as needed for Itching. 1 tablet 0 8/25/2019    fexofenadine (ALLEGRA) 180 MG tablet Take 180 mg by mouth once daily.   8/25/2019    fluticasone propionate (FLONASE) 50 mcg/actuation nasal spray INSERT 2 SPRAYS IN EACH NOSTRIL DAILY 16 g 5 8/25/2019    folic acid (FOLVITE) 1 MG tablet Take 1 tablet (1,000 mcg total) by mouth once daily. 30 tablet 11 8/25/2019    gabapentin (NEURONTIN) 300 MG capsule Take 1 capsule (300 mg total) by mouth 3 (three) times daily. 90 capsule 11 8/25/2019    hydrocortisone 1 % cream Apply topically 2 (two) times daily. 28.35 g 0 8/25/2019    inhalation spacing device (PROCHAMBER) USE AS DIRECTED 1 Device 0 8/25/2019    insulin aspart U-100 (NOVOLOG) 100 unit/mL (3 mL) InPn pen Inject 1-10 Units into the skin before meals and at bedtime as needed (Hyperglycemia). 15 mL 0 8/25/2019    lancets 33 gauge Misc Use to check blood glucose four times daily 120 each 11 8/25/2019    levalbuterol (XOPENEX) 1.25 mg/3 mL nebulizer solution Take 3 mLs (1.25 mg total) by nebulization every 8 (eight) hours as needed for Wheezing or Shortness of Breath. Rescue 90 mL 5 8/25/2019    lipase-protease-amylase (CREON) 36,000-114,000- 180,000 unit CpDR Take 4 capsules by mouth 3 (three) times daily with meals. Take 3 with snacks as needed. (Patient taking differently: Take 5 capsules by  "mouth 3 (three) times daily with meals. ) 800 capsule 11 8/25/2019    LORazepam (ATIVAN) 1 MG tablet Take 1 tablet (1 mg total) by mouth every 8 (eight) hours as needed for Anxiety. 90 tablet 2 8/25/2019    magnesium oxide (MAG-OX) 400 mg (241.3 mg magnesium) tablet Take 1 tablet (400 mg total) by mouth 3 (three) times daily. 90 tablet 11 8/25/2019    metoprolol tartrate (LOPRESSOR) 25 MG tablet Take 1 tablet (25 mg total) by mouth 2 (two) times daily. 60 tablet 11 8/25/2019    montelukast (SINGULAIR) 10 mg tablet Take 1 tablet (10 mg total) by mouth once daily. 30 tablet 11 8/25/2019    morphine (MS CONTIN) 15 MG 12 hr tablet Take 1 tablet by mouth twice daily 60 tablet 0 8/25/2019    multivit,min52-folic-vitK-cQ10 (AQUADEKS) 100-700-10 mcg-mcg-mg Cap cap 1 tab twice daily 60 capsule 0 8/25/2019    omeprazole (PRILOSEC) 40 MG capsule Take 1 capsule (40 mg total) by mouth once daily. 30 capsule 11 8/25/2019    ondansetron (ZOFRAN) 8 MG tablet Take 1 tablet (8 mg total) by mouth every 12 (twelve) hours as needed for Nausea. 30 tablet 11 8/25/2019    oxyCODONE (ROXICODONE) 5 MG immediate release tablet Take 1 tablet by mouth four times a day 120 tablet 0 8/25/2019    pen needle, diabetic 32 gauge x 5/32" Ndle Injecting 3 times a day 100 each 1 8/25/2019    polyethylene glycol (GLYCOLAX) 17 gram PwPk Take 17 g by mouth 2 (two) times daily. 60 packet 11 8/25/2019    predniSONE (DELTASONE) 5 MG tablet Take 3 tablets (15 mg total) by mouth once daily. 90 tablet 11 8/25/2019    QUEtiapine (SEROQUEL) 25 MG Tab Take 1 tablet by mouth in morning and 2 tablets at bedtime 90 tablet 2 8/25/2019    SITagliptin (JANUVIA) 100 MG Tab Take 1 tablet (100 mg total) by mouth once daily. 30 tablet 11 8/25/2019    tacrolimus (PROGRAF) 0.5 MG Cap Take 1 capsule (0.5 mg total) by mouth every morning. Total daily dose is 1.5 mg every morning and 1 mg every evening. 30 capsule 11 8/25/2019    tacrolimus (PROGRAF) 1 MG Cap Take " 1 capsule (1 mg total) by mouth every 12 (twelve) hours. 60 capsule 11 8/25/2019    tiZANidine (ZANAFLEX) 4 MG tablet Take 1 tablet (4 mg total) by mouth 2 (two) times daily. 60 tablet 1 8/25/2019    topiramate (TOPAMAX) 25 MG tablet Take 1 tablet (25 mg total) by mouth 2 (two) times daily 60 tablet 1 8/25/2019    valGANciclovir (VALCYTE) 450 mg Tab Take 1 tablet (450 mg total) by mouth 2 (two) times daily. 60 tablet 11 8/25/2019    venlafaxine (EFFEXOR-XR) 150 MG Cp24 Take 1 capsule (150 mg total) by mouth once daily. 30 capsule 2 8/25/2019    venlafaxine (EFFEXOR-XR) 37.5 MG 24 hr capsule Take 1 capsule (37.5 mg total) by mouth once daily. 30 capsule 2 8/25/2019    voriconazole (VFEND) 200 MG Tab Take 1 tablet (200 mg total) by mouth 2 (two) times daily. 60 tablet 5 8/25/2019       Review of patient's allergies indicates:   Allergen Reactions    Albuterol Palpitations    Colistin Anaphylaxis    Vancomycin analogues      Infusion reaction that does not resolve with slowing  Pt. States she can tolerate it when given with 50 mg Benadryl and ran over 3 hours    Neupogen [filgrastim] Other (See Comments)     Ostealgia after five daily doses of 300 mcg.      Bactrim [sulfamethoxazole-trimethoprim] Hives    Ceftazidime Hives     Pt stated can tolerate cefapine not ceftazidime    Ceftazidime     Dronabinol Other (See Comments)     Mental changes/hallucinations    Haldol [haloperidol lactate] Other (See Comments)     Seizure like activity    Nsaids (non-steroidal anti-inflammatory drug)      Cannot have due to lung transplant    Adhesive Rash     Cloth tape- please use tegaderm or paper tape    Aztreonam Rash    Ciprofloxacin Nausea And Vomiting     Projectile N/V, per patient.  Unwilling to retry therapy.       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     Past Surgical History:   Procedure Laterality Date    ABDOMINAL SURGERY      peg tube     [id number] N/A 5/19/2015    Performed by [facility]y Dias [facility] OR    BIOPSY-BRONCHUS N/A 12/20/2016    Performed by Jake Alvarez MD at [facility] OR [facility]    Bronchoscopy N/A 7/5/2019    Performed by Francisca Morin DO at [facility] OR [facility]    BRONCHOSCOPY Bilateral 12/11/2018    Performed by Francisca Morin DO at [facility] OR [facility]    BRONCHOSCOPY N/A 10/1/2018    Performed by Jake Alvarez MD at [facility]    BRONCHOSCOPY Bilateral 12/20/2016    Performed by Jake [facility]varez MD at [facility]    BRONCHOSCOPY - flexible bronchoscopy with probable tissue biopsy CPT 04736 N/A 7/21/2015    Performed by Jake Alvarez MD at [facility] OR [facility]    BRONCHOSCOPY - flexible bronchoscopy with probable tissue biospy CPT 96381 N/A 10/20/2015    Performed by Jake Alvarez MD at [facility] OR [facility]    BRONCHOSCOPY - flexible bronchoscopy with tissue biopsy CPT 54131 N/A 9/29/2015    Performed by Jake Alvarez MD at [facility] OR [facility]    BRONCHOSCOPY - flexible bronchoscopy with tissue biopsy CPT 86698 N/A 5/26/2015    Performed by Jake Alvarez MD at [facility] OR [facility]    BRONCHOSCOPY flexible bronchoscopy with tissue biopsy N/A 9/8/2014    Performed by Jake Alvarez MD at [facility] OR [facility]    BRONCHOSCOPY flexible with possible tissue biopsy N/A 8/8/2014    Performed by Jake Alvarez MD at [facility] OR [facility]    BRONCHOSCOPY, BAL, BIOPSIES N/A 3/20/2018    Performed by Jake Alvarez MD at [facility] OR [facility]    BRONCHOSCOPY-FIBEROPTIC N/A 1/29/2016    Performed by Joann Cifuentes DO at [facility] OR 2ND  [facility]    BRONCHOSCOPY; Bronchoscopy with BAL and transbronchial biopsies under general anesthesia N/A 5/29/2018    [facility]    CHOLECYSTECTOMY  2016    CHOLECYSTECTOMY-LAPAROSCOPIC N/A 7/22/2016    Performed by Joshua Goldberg[facility]    COLONOSCOPY N/A 5/3/2019    Performed by Juancho Rich MD at [facility] ENDO (2ND [facility])    ENDOMETRIAL ABLATION  2015    KJB    ETHMOIDECTOMY Bilateral 4/9/2019    Performed by Adin Burks III, MD at [facility] OR [facility]    EXPLORATION, WOUND - right chest N/A 8/28/2019    Performed by David Moses [facility]    FESS      FESS Bilateral 10/21/2013    Performed [facility] Jared [facility]    FESS, USING COMPUTER-ASSISTED NAVIGATION N/A 4/9/2019    Performed by Adin Burks III, MD at [facility] OR [address] [place] [address]    FINE NEEDLE ASPIRATION (FNA)  of a cervical lymphadenopathy  1/29/2016    Performed by Joann Cifuentes DO at [facility] OR [facility]    flex bronchoscopy with probable tissue biopsy N/A 7/31/2014    Performed by [facility] Provider at [facility]    flexible bronchoscopy with tissue biopsy N/A 12/11/2014    Performed by Jake [facility]varez [facility]    flexible bronchoscopy with tissue biopsy  CPT 02215 N/A 8/15/2019    Performed by Jake lAvarez MD [facility]    flexible bronchoscopy with tissue biopsy CPT 82064  N/A 7/26/2019    Performed by Jake [facility]varez [facility]    HYSTERECTOMY  04/2017    [facility]    INJECTION-VOCAL CORD Left 6/24/2014    Performed by Jared Whatley MD at [facility] OR [address] [place] [address]    [id number]-U-CATH to right chest and removal of portacath to left chests wall. Bilateral 6/24/2014    Performed by Zhen Dorado MD at [facility]    LARYNX SURGERY  2016    LUNG TRANSPLANT Bilateral 6/12/14    MAXILLARY ANTROSTOMY  4/9/2019    Performed by Adin Burks III, MD at [facility] OR [address] [place] [address]    MYRINGOTOMY W/ TUBES Right  04/2017    MYRINGOTOMY WITH INSERTION OF PE TUBES Right 4/15/2017    Performed by Gary Null [facility] at [facility]    PLACEMENT-TUBE-PEG  5/15/2014    [facility]    PORTACATH PLACEMENT Right     rt sc    REDO BILATERAL LUNG TRANSPLANT; CLAMSHELL Bilateral 6/18/2019    Performed by Jonathan Newby MD at [facility] OR [address] [place] [address]    REMOVAL-TUBE-PEG  5/15/2014    [facility]    RHC for lung re-transplant N/A 1/22/2019    Performed by Laura Rachel MD at [facility] CATH LAB    ROBOTIC ASSISTED LAPAROSCOPIC HYSTERECTOMY N/A 4/25/2017    Performed by Deny Dias Jr., MD at [facility] OR    SALPINGECTOMY Bilateral 2015    KJB    SALPINGECTOMY-LAPAROSCOPIC Bilateral 5/19/2015    Performed by Deny Dias Jr., MD at [facility] OR    SINUS SURGERY FUNCTIONAL ENDOSCOPIC WITH NAVIGATION Bilateral 4/3/2017    Performed by Cesar Olsen MD at [facility] OR [address] [place] [address]    SINUS SURGERY FUNCTIONAL ENDOSCOPIC WITH NAVIGATION, 75444, 01351, 06412, 50272 Bilateral 2/5/2015    Performed by Cesar Olsen MD at [facility] OR [address] [place] [address]    SPHENOIDECTOMY Bilateral 4/9/2019    Performed by Adin Burks III, MD at [facility] OR [address] [place] [address]    THYROPLASTY - Medialization laryngoplasty, cricothyroid subluxation, arytenoid repositioning Left 8/2/2016    [facility]    TRANSPLANT-LUNG Bilateral 6/11/2014    Performed by Adolfo Huston MD at [facility] OR [address] [place] [address]     Family History     Problem Relation (Age of Onset)    Cancer Maternal Grandmother    Diabetes Maternal Grandfather    Drug abuse Maternal Grandfather    Hypertension Maternal Grandmother    Thrombocytopenia Maternal Grandfather        Tobacco Use    Smoking status: Never Smoker    Smokeless tobacco: Never Used   Substance and Sexual Activity    Alcohol use: No     Comment: Has not had an alcoholic drink in more than 2 months.    Drug use: No    Sexual activity:  Yes     Partners: Male     Birth control/protection: See Surgical Hx     Comment: current partner since Oct 2016     Review of Systems   Unable to perform ROS: Acuity of condition     Objective:     Vital Signs (Most Recent):  Temp: 98.6 °F (37 °C) (09/02/19 0700)  Pulse: (!) 121 (09/02/19 0800)  Resp: (!) 35 (09/02/19 0728)  BP: (!) 88/52 (09/02/19 0800)  SpO2: (!) 94 % (09/02/19 0800) Vital Signs (24h Range):  Temp:  [95 °F (35 °C)-99.4 °F (37.4 °C)] 98.6 °F (37 °C)  Pulse:  [] 121  Resp:  [35] 35  SpO2:  [82 %-98 %] 94 %  BP: ()/(50-66) 88/52  Arterial Line BP: (100-154)/(43-67) 126/43     Weight: 67.6 kg (149 lb 0.5 oz)  Body mass index is 29.11 kg/m².    Physical Exam   Constitutional: She appears well-developed and well-nourished. She appears ill. She is sedated, chemically paralyzed and intubated.   HENT:   Head: Normocephalic and atraumatic.   ET and OG tubes in place   Eyes: Right eye exhibits no discharge. Left eye exhibits no discharge. No scleral icterus.   Neck: Neck supple.   Cardiovascular: Normal rate and regular rhythm.   No doppler signals obtained in RLE or LLE   Pulmonary/Chest: She is intubated. No respiratory distress. She has no wheezes. She has rales.   Mechanical ventilator sounds   Abdominal: Soft. Bowel sounds are normal.   Musculoskeletal: She exhibits edema. She exhibits no tenderness or deformity.   Skin: Skin is warm. There is pallor.   Right leg with mottling noted, Relatively warm, outlined per nursing staff  Left leg also noted to be mottled as are her bilateral hands   Nursing note and vitals reviewed.      Significant Labs:  BMP:   Recent Labs   Lab 09/02/19  0355   *   *   K 5.2*   CL 94*   CO2 16*   BUN 18   CREATININE 1.5*   CALCIUM 6.6*   MG 2.0     Lab Results   Component Value Date    WBC 21.60 (H) 09/02/2019    HGB 9.8 (L) 09/02/2019    HCT 30.6 (L) 09/02/2019    MCV 88 09/02/2019    PLT 24 (LL) 09/02/2019       Significant Diagnostics:  I have  reviewed all pertinent imaging results/findings within the past 24 hours.

## 2019-09-02 NOTE — PROGRESS NOTES
[facility]  Vascular Surgery  Progress Note    Patient Name: Juanita Ibarra  MRN: [id number]  Admission Date: 8/26/2019  Primary Care Provider: Primary Doctor No    Subjective:     Interval History:   Remains in the ICU, intubated, sedated, and on Levo 0.5, Vaso 0.04  Vascular u/s did not show an occlusion     Post-Op Info:  Procedure(s) (LRB):  EXPLORATION, WOUND - right chest (N/A)   5 Days Post-Op     Medications Prior to Admission   Medication Sig Dispense Refill Last Dose    blood sugar diagnostic Strp Preferred by insurance. Testing BG 4 times daily. 120 strip 10 8/25/2019    blood-glucose meter Misc Use to check blood glucose 4 (four) times daily. 1 each 1 8/25/2019    calcium-vitamin D3 (OS-KATY 500 + D3) 500 mg(1,250mg) -200 unit per tablet Take 1 tablet by mouth 2 (two) times daily with meals. 60 tablet 11 8/25/2019    dapsone 100 MG Tab Take 1 tablet (100 mg total) by mouth once daily. 30 tablet 11 8/25/2019    diphenhydrAMINE (BENADRYL) 50 MG tablet Take 1 tablet (50 mg total) by mouth every 6 (six) hours as needed for Itching. 1 tablet 0 8/25/2019    fexofenadine (ALLEGRA) 180 MG tablet Take 180 mg by mouth once daily.   8/25/2019    fluticasone propionate (FLONASE) 50 mcg/actuation nasal spray INSERT 2 SPRAYS IN EACH NOSTRIL DAILY 16 g 5 8/25/2019    folic acid (FOLVITE) 1 MG tablet Take 1 tablet (1,000 mcg total) by mouth once daily. 30 tablet 11 8/25/2019    gabapentin (NEURONTIN) 300 MG capsule Take 1 capsule (300 mg total) by mouth 3 (three) times daily. 90 capsule 11 8/25/2019    hydrocortisone 1 % cream Apply topically 2 (two) times daily. 28.35 g 0 8/25/2019    inhalation spacing device (PROCHAMBER) USE AS DIRECTED 1 Device 0 8/25/2019    insulin aspart U-100 (NOVOLOG) 100 unit/mL (3 mL) InPn pen Inject 1-10 Units into the skin before meals and at bedtime as needed (Hyperglycemia). 15 mL 0 8/25/2019    lancets 33 gauge Misc Use to check blood glucose four  "times daily 120 each 11 8/25/2019    levalbuterol (XOPENEX) 1.25 mg/3 mL nebulizer solution Take 3 mLs (1.25 mg total) by nebulization every 8 (eight) hours as needed for Wheezing or Shortness of Breath. Rescue 90 mL 5 8/25/2019    lipase-protease-amylase (CREON) 36,000-114,000- 180,000 unit CpDR Take 4 capsules by mouth 3 (three) times daily with meals. Take 3 with snacks as needed. (Patient taking differently: Take 5 capsules by mouth 3 (three) times daily with meals. ) 800 capsule 11 8/25/2019    LORazepam (ATIVAN) 1 MG tablet Take 1 tablet (1 mg total) by mouth every 8 (eight) hours as needed for Anxiety. 90 tablet 2 8/25/2019    magnesium oxide (MAG-OX) 400 mg (241.3 mg magnesium) tablet Take 1 tablet (400 mg total) by mouth 3 (three) times daily. 90 tablet 11 8/25/2019    metoprolol tartrate (LOPRESSOR) 25 MG tablet Take 1 tablet (25 mg total) by mouth 2 (two) times daily. 60 tablet 11 8/25/2019    montelukast (SINGULAIR) 10 mg tablet Take 1 tablet (10 mg total) by mouth once daily. 30 tablet 11 8/25/2019    morphine (MS CONTIN) 15 MG 12 hr tablet Take 1 tablet by mouth twice daily 60 tablet 0 8/25/2019    multivit,min52-folic-vitK-cQ10 (AQUADEKS) 100-700-10 mcg-mcg-mg Cap cap 1 tab twice daily 60 capsule 0 8/25/2019    omeprazole (PRILOSEC) 40 MG capsule Take 1 capsule (40 mg total) by mouth once daily. 30 capsule 11 8/25/2019    ondansetron (ZOFRAN) 8 MG tablet Take 1 tablet (8 mg total) by mouth every 12 (twelve) hours as needed for Nausea. 30 tablet 11 8/25/2019    oxyCODONE (ROXICODONE) 5 MG immediate release tablet Take 1 tablet by mouth four times a day 120 tablet 0 8/25/2019    pen needle, diabetic 32 gauge x 5/32" Ndle Injecting 3 times a day 100 each 1 8/25/2019    polyethylene glycol (GLYCOLAX) 17 gram PwPk Take 17 g by mouth 2 (two) times daily. 60 packet 11 8/25/2019    predniSONE (DELTASONE) 5 MG tablet Take 3 tablets (15 mg total) by mouth once daily. 90 tablet 11 8/25/2019    " QUEtiapine (SEROQUEL) 25 MG Tab Take 1 tablet by mouth in morning and 2 tablets at bedtime 90 tablet 2 8/25/2019    SITagliptin (JANUVIA) 100 MG Tab Take 1 tablet (100 mg total) by mouth once daily. 30 tablet 11 8/25/2019    tacrolimus (PROGRAF) 0.5 MG Cap Take 1 capsule (0.5 mg total) by mouth every morning. Total daily dose is 1.5 mg every morning and 1 mg every evening. 30 capsule 11 8/25/2019    tacrolimus (PROGRAF) 1 MG Cap Take 1 capsule (1 mg total) by mouth every 12 (twelve) hours. 60 capsule 11 8/25/2019    tiZANidine (ZANAFLEX) 4 MG tablet Take 1 tablet (4 mg total) by mouth 2 (two) times daily. 60 tablet 1 8/25/2019    topiramate (TOPAMAX) 25 MG tablet Take 1 tablet (25 mg total) by mouth 2 (two) times daily 60 tablet 1 8/25/2019    valGANciclovir (VALCYTE) 450 mg Tab Take 1 tablet (450 mg total) by mouth 2 (two) times daily. 60 tablet 11 8/25/2019    venlafaxine (EFFEXOR-XR) 150 MG Cp24 Take 1 capsule (150 mg total) by mouth once daily. 30 capsule 2 8/25/2019    venlafaxine (EFFEXOR-XR) 37.5 MG 24 hr capsule Take 1 capsule (37.5 mg total) by mouth once daily. 30 capsule 2 8/25/2019    voriconazole (VFEND) 200 MG Tab Take 1 tablet (200 mg total) by mouth 2 (two) times daily. 60 tablet 5 8/25/2019       Review of patient's allergies indicates:   Allergen Reactions    Albuterol Palpitations    Colistin Anaphylaxis    Vancomycin analogues      Infusion reaction that does not resolve with slowing  Pt. States she can tolerate it when given with 50 mg Benadryl and ran over 3 hours    Neupogen [filgrastim] Other (See Comments)     Ostealgia after five daily doses of 300 mcg.      Bactrim [sulfamethoxazole-trimethoprim] Hives    Ceftazidime Hives     Pt stated can tolerate cefapine not ceftazidime    Ceftazidime     Dronabinol Other (See Comments)     Mental changes/hallucinations    Haldol [haloperidol lactate] Other (See Comments)     Seizure like activity    Nsaids (non-steroidal  anti-inflammatory drug)      Cannot have due to lung transplant    Adhesive Rash     Cloth tape- please use tegaderm or paper tape    Aztreonam Rash    Ciprofloxacin Nausea And Vomiting     Projectile N/V, per patient.  Unwilling to retry therapy.       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     Past Surgical History:   Procedure Laterality Date    ABDOMINAL SURGERY      peg tube     GECDCFUC-JZDTBNSDDFG-EKIHOGNCIYOR N/A 5/19/2015    [facility]formed by [facility]y [facility] OR    BIOPSY-BRONCHUS N/A 12/20/2016    Performed by Jake [facility]varez [facility] at [facility] OR 2ND FLR    Bronchoscopy N/A 7/5/2019    Performed by Francisca Morin DO at [facility] OR [facility]    BRONCHOSCOPY Bilateral 12/11/2018    Performed by Francisca Morin DO at [facility] OR [facility]    BRONCHOSCOPY N/A 10/1/2018    Performed by Jake [facility]varez MD at [facility] OR [facility]    BRONCHOSCOPY Bilateral 12/20/2016    Performed by [facility] MD at [facility] OR [facility]    BRONCHOSCOPY - flexible bronchoscopy with probable tissue biopsy CPT 83445 N/A 7/21/2015    Performed by Jake [facility]varez MD at [facility] OR [facility]    BRONCHOSCOPY - flexible bronchoscopy with probable tissue biospy CPT 79839 N/A 10/20/2015    Performed by Jake [facility]varez MD at [facility] OR [facility]    BRONCHOSCOPY - flexible bronchoscopy with tissue biopsy CPT 01310 N/A 9/29/2015    Performed by Jake [facility]varez MD at [facility] OR [facility]    BRONCHOSCOPY - flexible bronchoscopy with tissue biopsy CPT 89416 N/A 5/26/2015    Performed by Jake [facility]varez MD at [facility] OR [facility]  [facility]    BRONCHOSCOPY flexible bronchoscopy with tissue biopsy N/A 9/8/2014    Performed by Jake [facility]varez MD at [facility] OR 2ND [facility]    BRONCHOSCOPY flexible with possible tissue biopsy N/A 8/8/2014    Performed by Jake Alvarez MD at [facility] OR [facility]ND [facility]    BRONCHOSCOPY, BAL, BIOPSIES N/A 3/20/2018    Performed by Jake [facility]varez MD at [facility] OR [facility]    BRONCHOSCOPY-FIBEROPTIC N/A 1/29/2016    Performed by Joann Cifuentes DO at [facility] OR 2ND [facility]LR    BRONCHOSCOPY; Bronchoscopy with BAL and transbronchial biopsies under general anesthesia N/A 5/29/2018    Performed by Jake Alvarez MD at [facility] OR [address] [place] [address]    CHOLECYSTECTOMY  2016    CHOLECYSTECTOMY-LAPAROSCOPIC N/A 7/22/2016    [facility]    COLONOSCOPY N/A 5/3/2019    Performed by Juancho Rich MD at [facility] ENDO (2ND FLR)    ENDOMETRIAL ABLATION  2015    K[facility]    ETHMOIDECTOMY Bilateral 4/9/2019    Performed by Adin Burks III, MD at [facility] OR 2ND FLR    EXPLORATION, WOUND - right chest N/A 8/28/2019    [facility]    FESS      FESS Bilateral 10/21/2013    [facility]    FESS, USING COMPUTER-ASSISTED NAVIGATION N/A 4/9/2019    Performed by Adin Burks III, MD at [facility] OR [address] [place] [address]    FINE NEEDLE ASPIRATION (FNA)  of a cervical lymphadenopathy  1/29/2016    Performed by Joann Cifuentes DO at [facility] OR [facility]    flex bronchoscopy with probable tissue biopsy N/A 7/31/2014    Performed by [facility] Provider at [facility] OR 2ND FLR    flexible bronchoscopy with tissue biopsy N/A 12/11/2014    Performed by Jake Alvarez MD at [facility] OR [facility]ND [facility]    flexible bronchoscopy with tissue biopsy  CPT 73777 N/A 8/15/2019    Performed by Jake Alvarez MD at [facility] OR [facility]ND [facility]    flexible bronchoscopy with tissue biopsy CPT 95717  N/A 7/26/2019    Performed by Jake Alvarez MD at [facility] OR [address] [place] [address]    HYSTERECTOMY   04/2017    [facility]    INJECTION-VOCAL CORD Left 6/24/2014    Performed by Jared Whatley MD at [facility] OR [facility]LR    CKSXXYQGR-XMXL-A-CATH to right chest and removal of portacath to left chests wall. Bilateral 6/24/2014    [facility]    LARYNX SURGERY  2016    LUNG TRANSPLANT Bilateral 6/12/14    MAXILLARY ANTROSTOMY  4/9/2019    Performed by Adin Burks III, MD at [facility] OR [facility] FLR    MYRINGOTOMY W/ TUBES Right 04/2017    MYRINGOTOMY WITH INSERTION OF PE TUBES Right 4/15/2017    Performed by Gary Null MD at [facility] OR [facility] FLR    PLACEMENT-TUBE-PEG  5/15/2014    [facility]    PORTACATH PLACEMENT Right     rt sc    REDO BILATERAL LUNG TRANSPLANT; CLAMSHELL Bilateral 6/18/2019    Performed by Jonathan Newby MD at [facility] OR [address] [place] [address]    REMOVAL-TUBE-PEG  5/15/2014    [facility]    RHC for lung re-transplant N/A 1/22/2019    Performed by Laura Rachel MD at [facility] CATH LAB    ROBOTIC ASSISTED LAPAROSCOPIC HYSTERECTOMY N/A 4/25/2017    Performed by Deny Dias Jr., MD at [facility] OR    SALPINGECTOMY Bilateral 2015    [facility]    SALPINGECTOMY-LAPAROSCOPIC Bilateral 5/19/2015    Performed by Deny Dias Jr., MD at [facility] OR    SINUS SURGERY FUNCTIONAL ENDOSCOPIC WITH NAVIGATION Bilateral 4/3/2017    Performed by Cesar Olsen MD at [facility] OR [address] [place] [address]    SINUS SURGERY FUNCTIONAL ENDOSCOPIC WITH NAVIGATION, 97168, 91117, 13893, 76539 Bilateral 2/5/2015    Performed by Cesar Olsen MD at [facility] OR 2ND [facility]    SPHENOIDECTOMY Bilateral 4/9/2019    Per[facility]    THYROPLASTY - Medialization laryngoplasty, cricothyroid subluxation, arytenoid repositioning Left 8/2/2016    Performed by Gary Null MD at [facility] OR [facility]    TRANSPLANT-LUNG Bilateral 6/11/2014    Performed by [facility]ust[facility]     Family  History     Problem Relation (Age of Onset)    Cancer Maternal Grandmother    Diabetes Maternal Grandfather    Drug abuse Maternal Grandfather    Hypertension Maternal Grandmother    Thrombocytopenia Maternal Grandfather        Tobacco Use    Smoking status: Never Smoker    Smokeless tobacco: Never Used   Substance and Sexual Activity    Alcohol use: No     Comment: Has not had an alcoholic drink in more than 2 months.    Drug use: No    Sexual activity: Yes     Partners: Male     Birth control/protection: See Surgical Hx     Comment: current partner since Oct 2016     Review of Systems   Unable to perform ROS: Acuity of condition     Objective:     Vital Signs (Most Recent):  Temp: 98.6 °F (37 °C) (09/02/19 0700)  Pulse: (!) 121 (09/02/19 0800)  Resp: (!) 35 (09/02/19 0728)  BP: (!) 88/52 (09/02/19 0800)  SpO2: (!) 94 % (09/02/19 0800) Vital Signs (24h Range):  Temp:  [95 °F (35 °C)-99.4 °F (37.4 °C)] 98.6 °F (37 °C)  Pulse:  [] 121  Resp:  [35] 35  SpO2:  [82 %-98 %] 94 %  BP: ()/(50-66) 88/52  Arterial Line BP: (100-154)/(43-67) 126/43     Weight: 67.6 kg (149 lb 0.5 oz)  Body mass index is 29.11 kg/m².    Physical Exam   Constitutional: She appears well-developed and well-nourished. She appears ill. She is sedated, chemically paralyzed and intubated.   HENT:   Head: Normocephalic and atraumatic.   ET and OG tubes in place   Eyes: Right eye exhibits no discharge. Left eye exhibits no discharge. No scleral icterus.   Neck: Neck supple.   Cardiovascular: Normal rate and regular rhythm.   No doppler signals obtained in RLE or LLE   Pulmonary/Chest: She is intubated. No respiratory distress. She has no wheezes. She has rales.   Mechanical ventilator sounds   Abdominal: Soft. Bowel sounds are normal.   Musculoskeletal: She exhibits edema. She exhibits no tenderness or deformity.   Skin: Skin is warm. There is pallor.   Right leg with mottling noted, Relatively warm, outlined per nursing staff  Left  leg also noted to be mottled as are her bilateral hands   Nursing note and vitals reviewed.      Significant Labs:  BMP:   Recent Labs   Lab 09/02/19  0355   *   *   K 5.2*   CL 94*   CO2 16*   BUN 18   CREATININE 1.5*   CALCIUM 6.6*   MG 2.0     Lab Results   Component Value Date    WBC 21.60 (H) 09/02/2019    HGB 9.8 (L) 09/02/2019    HCT 30.6 (L) 09/02/2019    MCV 88 09/02/2019    PLT 24 (LL) 09/02/2019       Significant Diagnostics:  I have reviewed all pertinent imaging results/findings within the past 24 hours.    Assessment/Plan:     * Ischemic leg  28yo F s/p bilateral lung txp x 2, most recently June 2018 now with MRSA bacteremia, on CRRT, requiring multiple high dose vasopressors and s/p PEA arrest on 8/31 for whom vascular was consulted in the setting of cold, pulseless right lower extremity. Vascular u/s without occlusion.     - Vascular u/s did not reveal anything actionable. Changes likely related to high dose vasopressors/critical condition  - Continue to monitor progression of skin changes to lower extremities, watch for wet gangrene  -  still on multiple high dose vasopressors this may continue to progress   - once the patient is stable and extubated we can re-assess for debridement after demarcation, this would be a morbid procedure and would need plastics involved  - She is currently too unstable for any surigical intervention but vascular will continue to follow peripherally and be available for intervention when indicated.   - Call with questions/concerns        Wiliam Herron MD  Vascular Surgery  [facility]

## 2019-09-02 NOTE — SUBJECTIVE & OBJECTIVE
Interval History: Hemodynamics improving but degree of support with pressors, MV, and CRRT remains profound. Antibiotics tailored as directed. Afebrile. WBC improving. Repeat echo pending.    Review of Systems   Unable to perform ROS: Intubated     Objective:     Vital Signs (Most Recent):  Temp: 99.1 °F (37.3 °C) (09/02/19 1500)  Pulse: (!) 119 (09/02/19 1600)  Resp: (!) 25 (09/02/19 1524)  BP: (!) 84/59 (09/02/19 1600)  SpO2: (!) 94 % (09/02/19 1600) Vital Signs (24h Range):  Temp:  [95 °F (35 °C)-99.1 °F (37.3 °C)] 99.1 °F (37.3 °C)  Pulse:  [] 119  Resp:  [25-35] 25  SpO2:  [83 %-99 %] 94 %  BP: ()/(51-69) 84/59  Arterial Line BP: (105-168)/() 168/111     Weight: 67.6 kg (149 lb 0.5 oz)  Body mass index is 29.11 kg/m².    Estimated Creatinine Clearance: 47 mL/min (A) (based on SCr of 1.5 mg/dL (H)).    Physical Exam   Constitutional: She appears well-developed. She has a sickly appearance. She appears distressed.   Intubated, sedated, paralyzed, on pressors, CRRT.   HENT:   Head: Normocephalic.   Neck: No tracheal deviation present.   Cardiovascular: Regular rhythm. Tachycardia present.   No murmur heard.  Pulmonary/Chest: She is in respiratory distress. She has decreased breath sounds in the right lower field. She has no wheezes. She has rhonchi in the right upper field, the right middle field, the left upper field and the left middle field. She has no rales.   Intubated, paralyzed.   Abdominal: She exhibits no distension. There is no tenderness.   Genitourinary:   Genitourinary Comments: saleh   Musculoskeletal: She exhibits edema. She exhibits no deformity.   Neurological:   Paralyzed, sedated.   Skin: Skin is warm and dry. Rash noted. There is pallor.   Mottling most prominent in RLE.   Nursing note and vitals reviewed.      Significant Labs:   CBC:   Recent Labs   Lab 09/01/19  1945 09/02/19  0355 09/02/19  0754   WBC 25.19* 24.14* 21.60*   HGB 10.5* 10.0* 9.8*   HCT 32.3* 32.3* 30.6*    PLT 22* 24* 22*     CMP:   Recent Labs   Lab 09/01/19  0800  09/01/19  1945 09/01/19  2340 09/02/19  0355 09/02/19  0754 09/02/19  1357   NA  --    < >  --  129* 129*  --  128*   K  --    < >  --  4.9 5.2*  --  5.5*   CL  --    < >  --  96 94*  --  94*   CO2  --    < >  --  16* 16*  --  19*   GLU  --    < >  --  178* 172*  --  144*   BUN  --    < >  --  17 18  --  20   CREATININE  --    < >  --  1.4 1.5*  --  1.5*   CALCIUM  --    < >  --  6.5* 6.6*  --  6.3*   PROT 3.8*  --  4.0*  --   --  4.1*  --    ALBUMIN 1.2*   < > 1.1* 1.0* 1.1* 1.1* 1.0*   BILITOT 0.7  --  0.9  --   --  1.0  --    ALKPHOS 246*  --  409*  --   --  567*  --    AST 1,373*  --  1,247*  --   --  1,015*  --    *  --  442*  --   --  432*  --    ANIONGAP  --    < >  --  17* 19*  --  15   EGFRNONAA  --    < >  --  50.5* 46.4*  --  46.4*    < > = values in this interval not displayed.       Significant Imaging: I have reviewed all pertinent imaging results/findings within the past 24 hours.

## 2019-09-03 NOTE — PT/OT/SLP PROGRESS
Occupational Therapy      Patient Name:  Juanita Ibarra   MRN:  [id number]    Patient not seen today secondary to Other (Comment)(Pt is intubated and sedated.). Will follow-up tomorrow.    LUCIA Crandall  9/3/2019

## 2019-09-03 NOTE — PROGRESS NOTES
SW met with pt's spouse, mother in law, and pt's aunt (on spouse's side) in order to offer pyshcosoical support.  Per medical record pt's condition declined sharpley over weekend and pt was re-intubated/sedated.  Pt arressted on 8/31 and was started CRRT.  Per medical record family was also informed over the weekend that pt may be at risk for limb amputations if pt survives.  Per Dr. Alvarez plan for today is to continue to monitor pt and to have Echo and CT of head performed.  SW offered support to through discussion and active listenting.  Family was not very communicative, however stated that they were using each other as support.   Family voiced no needs or questions at this time.  SW will continue to provide psychosocial support, education, resources and assistance with needs as appropriate

## 2019-09-03 NOTE — PROGRESS NOTES
CRRT:    Daily checks done  Machine due for change. Rinsed back.  ACCESS: right femoral catheter; with sluggish flow on both ports.  Primary nurse dwell Alteplase in the catheter.  Machine ready at bedside.

## 2019-09-03 NOTE — ASSESSMENT & PLAN NOTE
Right chest port removed. Repeat blood cultures with MRSA. Echo and CT head today. ID following, appreciate recs. Continue vancomycin, Zerbaxa, and Micafungin.

## 2019-09-03 NOTE — PROGRESS NOTES
Dr. Alvarez notified of positive blood cultures drawn 09/02 aerobic bottle gram positive cocci in clusters resembling Staph

## 2019-09-03 NOTE — PROGRESS NOTES
[facility]  Lung Transplant  Progress Note - Critical Care    Patient Name: Juanita Ibarra  MRN: [id number]  Admission Date: 8/26/2019  Hospital Length of Stay: 8 days  Post-Operative Day: 1909 (Lung), 77 (Lung)  Attending Physician: Jake Alvarez MD  Primary Care Provider: Primary Doctor No     Subjective:     Interval History: No acute events overnight. Remains intubated and sedated with versed and fentanyl. Oxygenation stable. Remains anuric on CRRT. Off angiotension II since the weekend. On levo/vaso.     Continuous Infusions:   sodium chloride 0.9%      fentanyl Stopped (09/03/19 0400)    heparin (porcine) in 5 % dex 300 Units/hr (09/03/19 0600)    midazolam (VERSED) infusion (non-titrating) Stopped (09/02/19 2300)    norepinephrine bitartrate-D5W 0.2 mcg/kg/min (09/03/19 0800)    vasopressin (PITRESSIN) infusion 0.04 Units/min (09/03/19 0800)     Scheduled Meds:   carboxymethylcellulose sodium   Both Eyes TID    chlorhexidine  15 mL Mouth/Throat BID    hydrocortisone sodium succinate  50 mg Intravenous Q6H    levalbuterol  1.25 mg Nebulization Q8H    micafungin (MYCAMINE) IVPB  100 mg Intravenous Q24H    pantoprazole  40 mg Intravenous Daily    sodium chloride 0.9%  1,000 mL Intravenous Once    sodium chloride 0.9%  500 mL Intravenous Once    vancomycin (VANCOCIN) IVPB (custom)  750 mg Intravenous Once    vorconazole (VFEND) IVPB  4 mg/kg Intravenous Q12H     PRN Meds:sodium chloride, dextrose 50%, diphenhydrAMINE, diphenhydrAMINE, glucagon (human recombinant), HYDROmorphone, insulin aspart U-100, LORazepam, magnesium sulfate IVPB, metoprolol, ondansetron, oxyCODONE, promethazine (PHENERGAN) IVPB, sodium phosphate IVPB, sodium phosphate IVPB, sodium phosphate IVPB    Review of patient's allergies indicates:   Allergen Reactions    Albuterol Palpitations    Colistin Anaphylaxis    Vancomycin analogues      Infusion reaction that does not resolve with  slowing  Pt. States she can tolerate it when given with 50 mg Benadryl and ran over 3 hours    Neupogen [filgrastim] Other (See Comments)     Ostealgia after five daily doses of 300 mcg.      Bactrim [sulfamethoxazole-trimethoprim] Hives    Ceftazidime Hives     Pt stated can tolerate cefapine not ceftazidime    Ceftazidime     Dronabinol Other (See Comments)     Mental changes/hallucinations    Haldol [haloperidol lactate] Other (See Comments)     Seizure like activity    Nsaids (non-steroidal anti-inflammatory drug)      Cannot have due to lung transplant    Adhesive Rash     Cloth tape- please use tegaderm or paper tape    Aztreonam Rash    Ciprofloxacin Nausea And Vomiting     Projectile N/V, per patient.  Unwilling to retry therapy.       Review of Systems   Unable to perform ROS: Intubated     Subjective:     Interval History: No acute events overnight. Remains intubated and sedated. Oxygenation improved today and FiO2 was decreased to 50%. Remains anuric on CRRT. Pressor requirements have decreased and is now off angiotension II.     Continuous Infusions:   sodium chloride 0.9%      fentanyl Stopped (09/03/19 0400)    heparin (porcine) in 5 % dex 300 Units/hr (09/03/19 0600)    midazolam (VERSED) infusion (non-titrating) Stopped (09/02/19 2300)    norepinephrine bitartrate-D5W 0.2 mcg/kg/min (09/03/19 0800)    vasopressin (PITRESSIN) infusion 0.04 Units/min (09/03/19 0800)     Scheduled Meds:   carboxymethylcellulose sodium   Both Eyes TID    chlorhexidine  15 mL Mouth/Throat BID    hydrocortisone sodium succinate  50 mg Intravenous Q6H    levalbuterol  1.25 mg Nebulization Q8H    micafungin (MYCAMINE) IVPB  100 mg Intravenous Q24H    pantoprazole  40 mg Intravenous Daily    sodium chloride 0.9%  1,000 mL Intravenous Once    sodium chloride 0.9%  500 mL Intravenous Once    vancomycin (VANCOCIN) IVPB (custom)  750 mg Intravenous Once    vorconazole (VFEND) IVPB  4 mg/kg Intravenous  Q12H     PRN Meds:sodium chloride, dextrose 50%, diphenhydrAMINE, diphenhydrAMINE, glucagon (human recombinant), HYDROmorphone, insulin aspart U-100, LORazepam, magnesium sulfate IVPB, metoprolol, ondansetron, oxyCODONE, promethazine (PHENERGAN) IVPB, sodium phosphate IVPB, sodium phosphate IVPB, sodium phosphate IVPB    Review of patient's allergies indicates:   Allergen Reactions    Albuterol Palpitations    Colistin Anaphylaxis    Vancomycin analogues      Infusion reaction that does not resolve with slowing  Pt. States she can tolerate it when given with 50 mg Benadryl and ran over 3 hours    Neupogen [filgrastim] Other (See Comments)     Ostealgia after five daily doses of 300 mcg.      Bactrim [sulfamethoxazole-trimethoprim] Hives    Ceftazidime Hives     Pt stated can tolerate cefapine not ceftazidime    Ceftazidime     Dronabinol Other (See Comments)     Mental changes/hallucinations    Haldol [haloperidol lactate] Other (See Comments)     Seizure like activity    Nsaids (non-steroidal anti-inflammatory drug)      Cannot have due to lung transplant    Adhesive Rash     Cloth tape- please use tegaderm or paper tape    Aztreonam Rash    Ciprofloxacin Nausea And Vomiting     Projectile N/V, per patient.  Unwilling to retry therapy.       Review of Systems   Unable to perform ROS: Intubated     Objective:   Physical Exam   Constitutional: She appears well-developed and well-nourished. She appears toxic. She is sedated and intubated.   HENT:   Head: Normocephalic and atraumatic.   ET and OG tubes in place   Eyes: Right eye exhibits no discharge. Left eye exhibits no discharge. No scleral icterus.   Neck: Neck supple.   Cardiovascular: Regular rhythm. Tachycardia present.   Pulmonary/Chest: She is intubated. No respiratory distress. She has no wheezes. She has rales.   Mechanical breath sounds   Abdominal: Soft. Bowel sounds are normal.   Musculoskeletal: She exhibits edema (generalized). She exhibits  no tenderness or deformity.   Skin: There is pallor.   Bilateral lower extremity mottling (right leg extends to mid-thigh, well demarcated), bilateral hands and digits mottled, cool to touch   Nursing note and vitals reviewed.        Vital Signs (Most Recent):  Temp: 99.3 °F (37.4 °C) (09/03/19 0908)  Pulse: 110 (09/03/19 0908)  Resp: (!) 26 (09/02/19 2303)  BP: (!) 123/51 (09/03/19 0900)  SpO2: 97 % (09/03/19 0908) Vital Signs (24h Range):  Temp:  [98.1 °F (36.7 °C)-99.3 °F (37.4 °C)] 99.3 °F (37.4 °C)  Pulse:  [108-128] 110  Resp:  [25-26] 26  SpO2:  [91 %-99 %] 97 %  BP: ()/(51-75) 123/51  Arterial Line BP: ()/() 91/79     Weight: 72 kg (158 lb 11.7 oz)  Body mass index is 31 kg/m².      Intake/Output Summary (Last 24 hours) at 9/3/2019 1042  Last data filed at 9/3/2019 0530  Gross per 24 hour   Intake 5975.2 ml   Output 4989 ml   Net 986.2 ml       Ventilator Data:     Vent Mode: A/C  Oxygen Concentration (%):  [39-98] 40  Resp Rate Total:  [25 br/min-30 br/min] 25 br/min  Vt Set:  [320 mL] 320 mL  PEEP/CPAP:  [5 cmH20] 5 cmH20  Pressure Support:  [5 cmH20] 5 cmH20  Mean Airway Pressure:  [11 cmH20-15 cmH20] 11 cmH20    Hemodynamic Parameters:       Lines/Drains:       Hemodialysis Catheter 08/31/19 1330 right femoral (Active)   Site Assessment Clean;Dry;Intact;No redness;No swelling 9/1/2019 11:15 AM   Status Accessed 9/1/2019 11:15 AM   Flows Good 9/1/2019  3:00 AM   Dressing Intervention New dressing 8/31/2019  3:15 PM   Dressing Status Biopatch in place;Clean;Dry;Intact 9/1/2019 11:15 AM   Dressing Change Due 09/07/19 9/1/2019  3:00 AM   Site Condition No complications 9/1/2019 11:15 AM   Dressing Occlusive 9/1/2019  3:00 AM   Daily Line Review Performed 9/1/2019  3:00 AM   Number of days: 0            Percutaneous Central Line Insertion/Assessment - triple lumen  08/26/19 1135 left internal jugular (Active)   Dressing biopatch in place;dressing dry and intact 9/1/2019 11:15 AM   Securement  secured w/ sutures 9/1/2019 11:15 AM   Additional Site Signs no erythema;no warmth;no edema;no pain;no palpable cord;no streak formation;no drainage 9/1/2019 11:15 AM   Distal Patency/Care infusing 9/1/2019 11:15 AM   Medial Patency/Care infusing 9/1/2019 11:15 AM   Proximal Patency/Care infusing 9/1/2019 11:15 AM   Waveform normal 8/30/2019  3:15 PM   Line Interventions line leveled/zeroed 9/1/2019 11:15 AM   Dressing Change Due 09/02/19 9/1/2019 11:15 AM   Daily Line Review Performed 9/1/2019 11:15 AM   Number of days: 6            Peripheral IV - Single Lumen 08/26/19 1119 20 G Anterior;Right Forearm (Active)   Site Assessment Clean;Dry;Intact;No redness;No swelling 9/1/2019 11:15 AM   Line Status Infusing 9/1/2019 11:15 AM   Dressing Status Clean;Dry;Intact 9/1/2019 11:15 AM   Dressing Intervention Dressing reinforced 8/28/2019  3:00 PM   Dressing Change Due 08/30/19 9/1/2019  3:00 AM   Site Change Due 09/30/19 8/31/2019  7:00 PM   Reason Not Rotated Poor venous access 9/1/2019 11:15 AM   Number of days: 6            Peripheral IV - Single Lumen 08/30/19 1600 20 G Right Forearm (Active)   Site Assessment Clean;Dry;Intact;No redness;No swelling 9/1/2019 11:15 AM   Line Status Infusing 9/1/2019 11:15 AM   Dressing Status Clean;Dry;Intact 9/1/2019 11:15 AM   Dressing Change Due 09/03/19 9/1/2019 11:15 AM   Site Change Due 09/03/19 8/31/2019  7:00 PM   Reason Not Rotated Not due 9/1/2019 11:15 AM   Number of days: 1            Arterial Line 08/31/19 0120 Left Femoral (Active)   Site Assessment Clean;Dry;Intact;No redness;No swelling 9/1/2019 11:15 AM   Line Status Pulsatile blood flow 9/1/2019 11:15 AM   Art Line Waveform Appropriate 9/1/2019 11:15 AM   Arterial Line Interventions Zeroed and calibrated;Leveled 9/1/2019 11:15 AM   Color/Movement/Sensation Cool fingers/toes;Dusky fingers/toes 9/1/2019 11:15 AM   Dressing Type Transparent 9/1/2019 11:15 AM   Dressing Status Clean;Dry;Intact 9/1/2019 11:15 AM   Dressing  Intervention New dressing 9/1/2019  3:00 AM   Dressing Change Due 09/03/19 9/1/2019  3:00 AM   Number of days: 1            NG/OG Tube 08/30/19 1000 Right nostril (Active)   Placement Check placement verified by x-ray 9/1/2019 11:15 AM   Tolerance no signs/symptoms of discomfort 9/1/2019 11:15 AM   Securement secured to nostril center w/ adhesive device 9/1/2019 11:15 AM   Clamp Status/Tolerance unclamped 9/1/2019 11:15 AM   Suction Setting/Drainage Method suction at;low;intermittent setting 9/1/2019 11:15 AM   Insertion Site Appearance no redness, warmth, tenderness, skin breakdown, drainage 9/1/2019 11:15 AM   Drainage Bile;Green;Brown 9/1/2019 11:15 AM   Flush/Irrigation flushed w/;water;no resistance met 9/1/2019 11:15 AM   Intake (mL) 60 mL 8/31/2019 12:00 PM   Tube Output(mL)(Include Discarded Residual) 50 mL 9/1/2019  5:00 AM   Number of days: 2       Significant Labs:  CBC:  Recent Labs   Lab 09/03/19  0756   WBC 13.34*   RBC 3.22*   HGB 9.1*   HCT 28.6*   PLT 14*   MCV 89   MCH 28.3   MCHC 31.8*     BMP:  Recent Labs   Lab 09/03/19  0300   *   K 5.7*   CL 93*   CO2 20*   BUN 22*   CREATININE 1.6*   CALCIUM 6.6*      Tacrolimus Levels:  Recent Labs   Lab 09/01/19  0436   TACROLIMUS 1.9*     Microbiology:  Microbiology Results (last 7 days)     Procedure Component Value Units Date/Time    Blood culture [[id number]] Collected:  09/03/19 0928    Order Status:  Sent Specimen:  Blood from Line, Femoral, Left Updated:  09/03/19 0929    Blood culture [[id number]] Collected:  09/03/19 0928    Order Status:  Sent Specimen:  Blood from Line, Femoral, Left Updated:  09/03/19 0929    Blood culture [[id number]] Collected:  09/02/19 1128    Order Status:  Completed Specimen:  Blood from Line, Femoral, Left Updated:  09/03/19 0850     Blood Culture, Routine Gram stain aer bottle: Gram positive cocci in clusters resembling Staph       Results called to and read back by:Dolores Richardson RN  09/03/2019  08:49    Blood culture  [[id number]] Collected:  09/02/19 1018    Order Status:  Completed Specimen:  Blood from Peripheral, Forearm, Right Updated:  09/03/19 0611     Blood Culture, Routine Gram stain aer bottle: Gram positive cocci in clusters resembling Staph       Results called to and read back by: Cassidy Abreu RN 09/03/2019  06:11    Blood culture [[id number]] Collected:  08/30/19 1445    Order Status:  Completed Specimen:  Blood from Peripheral, Forearm, Right Updated:  09/02/19 1612     Blood Culture, Routine No Growth to date      No Growth to date      No Growth to date      No Growth to date    Blood culture [[id number]] Collected:  08/28/19 1250    Order Status:  Completed Specimen:  Blood from Peripheral, Hand, Right Updated:  09/02/19 1612     Blood Culture, Routine No growth after 5 days.    Blood culture [[id number]]  (Abnormal) Collected:  08/30/19 1450    Order Status:  Completed Specimen:  Blood from Peripheral, Hand, Left Updated:  09/02/19 0847     Blood Culture, Routine Gram stain lisa bottle: Gram positive cocci       Results called to and read back by:Torri Murdock RN 08/31/2019  16:23      METHICILLIN RESISTANT STAPHYLOCOCCUS AUREUS  ID consult required at [facility] and [facility].  For susceptibility see order #[id number]      Fungus culture [[id number]]  (Abnormal) Collected:  08/26/19 1441    Order Status:  Completed Specimen:  Respiratory from Bronchial Wash Updated:  09/01/19 1236     Fungus (Mycology) Culture BRANDI GLABRATA  Many      Narrative:       Bronchial Wash    Blood culture [[id number]]  (Abnormal)  (Susceptibility) Collected:  08/28/19 1020    Order Status:  Completed Specimen:  Blood from Peripheral, Wrist, Right Updated:  08/31/19 1043     Blood Culture, Routine Gram stain aer bottle: Gram positive cocci in clusters resembling Staph      Results called to and read back by:Lashanda Rios RN 08/29/2019  17:38      METHICILLIN RESISTANT STAPHYLOCOCCUS AUREUS  ID consult  required at [facility].      Blood culture #1 **CANNOT BE ORDERED STAT** [[id number]]  (Abnormal) Collected:  08/26/19 0848    Order Status:  Completed Specimen:  Blood from Line, Port A Cath Updated:  08/29/19 1111     Blood Culture, Routine Gram stain lisa bottle: Gram positive cocci in clusters resembling Staph       Results called to and read back by:Monet Martinez RN 08/27/2019  06:04      Gram stain aer bottle: Gram positive cocci in clusters resembling Staph       08/28/2019  10:29      METHICILLIN RESISTANT STAPHYLOCOCCUS AUREUS  ID consult required at [facility].  For susceptibility see order #[id number]      Blood culture #2 **CANNOT BE ORDERED STAT** [[id number]]  (Abnormal)  (Susceptibility) Collected:  08/26/19 0859    Order Status:  Completed Specimen:  Blood from Wrist, Right Updated:  08/29/19 1110     Blood Culture, Routine Gram stain aer bottle: Gram positive cocci in clusters resembling Staph       Results called to and read back by:Monet Martinez RN 08/27/2019 03:23      Gram stain lisa bottle: Gram positive cocci in clusters resembling Staph       08/27/2019  11:38      METHICILLIN RESISTANT STAPHYLOCOCCUS AUREUS  ID consult required at [facility].      Culture, Respiratory [[id number]]  (Abnormal)  (Susceptibility) Collected:  08/26/19 1441    Order Status:  Completed Specimen:  Respiratory from Bronchial Wash Updated:  08/28/19 1213     Respiratory Culture METHICILLIN RESISTANT STAPHYLOCOCCUS AUREUS  Many       Gram Stain (Respiratory) <10 epithelial cells per low power field.     Gram Stain (Respiratory) Few WBC's     Gram Stain (Respiratory) Many Gram positive cocci    Narrative:       Bronchial Wash    AFB Culture & Smear [[id number]] Collected:  08/26/19 1441    Order Status:  Completed Specimen:  Respiratory from Bronchial Wash Updated:  08/27/19 2127     AFB Culture  & Smear Culture in progress     AFB CULTURE STAIN No acid fast bacilli seen.    Narrative:       Bronchial Wash          I have reviewed all pertinent labs within the past 24 hours.        Objective:   Physical Exam   Constitutional: She appears well-developed and well-nourished. She appears toxic. She is sedated and intubated.   HENT:   Head: Normocephalic and atraumatic.   ET and OG tubes in place   Eyes: Right eye exhibits no discharge. Left eye exhibits no discharge. No scleral icterus.   Neck: Neck supple.   Cardiovascular: Regular rhythm. Tachycardia present.   Pulmonary/Chest: She is intubated. No respiratory distress. She has no wheezes. She has rales.   Mechanical breath sounds   Abdominal: Soft. Bowel sounds are normal.   Musculoskeletal: She exhibits edema (generalized). She exhibits no tenderness or deformity.   Skin: There is pallor.   Bilateral lower extremity mottling (right leg extends to mid-thigh, well demarcated), bilateral hands and digits mottled, tips of left digits black, cool to touch   Nursing note and vitals reviewed.        Vital Signs (Most Recent):  Temp: 99.3 °F (37.4 °C) (09/03/19 0908)  Pulse: 110 (09/03/19 0908)  Resp: (!) 26 (09/02/19 2303)  BP: (!) 123/51 (09/03/19 0900)  SpO2: 97 % (09/03/19 0908) Vital Signs (24h Range):  Temp:  [98.1 °F (36.7 °C)-99.3 °F (37.4 °C)] 99.3 °F (37.4 °C)  Pulse:  [108-128] 110  Resp:  [25-26] 26  SpO2:  [91 %-99 %] 97 %  BP: ()/(51-75) 123/51  Arterial Line BP: ()/() 91/79     Weight: 72 kg (158 lb 11.7 oz)  Body mass index is 31 kg/m².      Intake/Output Summary (Last 24 hours) at 9/3/2019 1042  Last data filed at 9/3/2019 0530  Gross per 24 hour   Intake 5975.2 ml   Output 4989 ml   Net 986.2 ml       Ventilator Data:     Vent Mode: A/C  Oxygen Concentration (%):  [39-98] 40  Resp Rate Total:  [25 br/min-30 br/min] 25 br/min  Vt Set:  [320 mL] 320 mL  PEEP/CPAP:  [5 cmH20] 5 cmH20  Pressure Support:  [5 cmH20] 5 cmH20  Mean Airway  Pressure:  [11 cmH20-15 cmH20] 11 cmH20    Hemodynamic Parameters:       Lines/Drains:       Hemodialysis Catheter 08/31/19 1330 right femoral (Active)   Site Assessment Clean;Dry;Intact;No redness;No swelling 9/1/2019 11:15 AM   Status Accessed 9/1/2019 11:15 AM   Flows Good 9/1/2019  3:00 AM   Dressing Intervention New dressing 8/31/2019  3:15 PM   Dressing Status Biopatch in place;Clean;Dry;Intact 9/1/2019 11:15 AM   Dressing Change Due 09/07/19 9/1/2019  3:00 AM   Site Condition No complications 9/1/2019 11:15 AM   Dressing Occlusive 9/1/2019  3:00 AM   Daily Line Review Performed 9/1/2019  3:00 AM   Number of days: 0            Percutaneous Central Line Insertion/Assessment - triple lumen  08/26/19 1135 left internal jugular (Active)   Dressing biopatch in place;dressing dry and intact 9/1/2019 11:15 AM   Securement secured w/ sutures 9/1/2019 11:15 AM   Additional Site Signs no erythema;no warmth;no edema;no pain;no palpable cord;no streak formation;no drainage 9/1/2019 11:15 AM   Distal Patency/Care infusing 9/1/2019 11:15 AM   Medial Patency/Care infusing 9/1/2019 11:15 AM   Proximal Patency/Care infusing 9/1/2019 11:15 AM   Waveform normal 8/30/2019  3:15 PM   Line Interventions line leveled/zeroed 9/1/2019 11:15 AM   Dressing Change Due 09/02/19 9/1/2019 11:15 AM   Daily Line Review Performed 9/1/2019 11:15 AM   Number of days: 6            Peripheral IV - Single Lumen 08/26/19 1119 20 G Anterior;Right Forearm (Active)   Site Assessment Clean;Dry;Intact;No redness;No swelling 9/1/2019 11:15 AM   Line Status Infusing 9/1/2019 11:15 AM   Dressing Status Clean;Dry;Intact 9/1/2019 11:15 AM   Dressing Intervention Dressing reinforced 8/28/2019  3:00 PM   Dressing Change Due 08/30/19 9/1/2019  3:00 AM   Site Change Due 09/30/19 8/31/2019  7:00 PM   Reason Not Rotated Poor venous access 9/1/2019 11:15 AM   Number of days: 6            Peripheral IV - Single Lumen 08/30/19 1600 20 G Right Forearm (Active)   Site  Assessment Clean;Dry;Intact;No redness;No swelling 9/1/2019 11:15 AM   Line Status Infusing 9/1/2019 11:15 AM   Dressing Status Clean;Dry;Intact 9/1/2019 11:15 AM   Dressing Change Due 09/03/19 9/1/2019 11:15 AM   Site Change Due 09/03/19 8/31/2019  7:00 PM   Reason Not Rotated Not due 9/1/2019 11:15 AM   Number of days: 1            Arterial Line 08/31/19 0120 Left Femoral (Active)   Site Assessment Clean;Dry;Intact;No redness;No swelling 9/1/2019 11:15 AM   Line Status Pulsatile blood flow 9/1/2019 11:15 AM   Art Line Waveform Appropriate 9/1/2019 11:15 AM   Arterial Line Interventions Zeroed and calibrated;Leveled 9/1/2019 11:15 AM   Color/Movement/Sensation Cool fingers/toes;Dusky fingers/toes 9/1/2019 11:15 AM   Dressing Type Transparent 9/1/2019 11:15 AM   Dressing Status Clean;Dry;Intact 9/1/2019 11:15 AM   Dressing Intervention New dressing 9/1/2019  3:00 AM   Dressing Change Due 09/03/19 9/1/2019  3:00 AM   Number of days: 1            NG/OG Tube 08/30/19 1000 Right nostril (Active)   Placement Check placement verified by x-ray 9/1/2019 11:15 AM   Tolerance no signs/symptoms of discomfort 9/1/2019 11:15 AM   Securement secured to nostril center w/ adhesive device 9/1/2019 11:15 AM   Clamp Status/Tolerance unclamped 9/1/2019 11:15 AM   Suction Setting/Drainage Method suction at;low;intermittent setting 9/1/2019 11:15 AM   Insertion Site Appearance no redness, warmth, tenderness, skin breakdown, drainage 9/1/2019 11:15 AM   Drainage Bile;Green;Brown 9/1/2019 11:15 AM   Flush/Irrigation flushed w/;water;no resistance met 9/1/2019 11:15 AM   Intake (mL) 60 mL 8/31/2019 12:00 PM   Tube Output(mL)(Include Discarded Residual) 50 mL 9/1/2019  5:00 AM   Number of days: 2       Significant Labs:  CBC:  Recent Labs   Lab 09/03/19  0756   WBC 13.34*   RBC 3.22*   HGB 9.1*   HCT 28.6*   PLT 14*   MCV 89   MCH 28.3   MCHC 31.8*     BMP:  Recent Labs   Lab 09/03/19  0300   *   K 5.7*   CL 93*   CO2 20*   BUN 22*    CREATININE 1.6*   CALCIUM 6.6*      Tacrolimus Levels:  Recent Labs   Lab 09/01/19  0436   TACROLIMUS 1.9*     Microbiology:  Microbiology Results (last 7 days)     Procedure Component Value Units Date/Time    Blood culture [[id number]] Collected:  09/03/19 0928    Order Status:  Sent Specimen:  Blood from Line, Femoral, Left Updated:  09/03/19 0929    Blood culture [[id number]] Collected:  09/03/19 0928    Order Status:  Sent Specimen:  Blood from Line, Femoral, Left Updated:  09/03/19 0929    Blood culture [[id number]] Collected:  09/02/19 1128    Order Status:  Completed Specimen:  Blood from Line, Femoral, Left Updated:  09/03/19 0850     Blood Culture, Routine Gram stain aer bottle: Gram positive cocci in clusters resembling Staph       Results called to and read back by:Dolores Richardson RN  09/03/2019  08:49    Blood culture [[id number]] Collected:  09/02/19 1018    Order Status:  Completed Specimen:  Blood from Peripheral, Forearm, Right Updated:  09/03/19 0611     Blood Culture, Routine Gram stain aer bottle: Gram positive cocci in clusters resembling Staph       Results called to and read back by: Cassidy Abreu RN 09/03/2019  06:11    Blood culture [[id number]] Collected:  08/30/19 1445    Order Status:  Completed Specimen:  Blood from Peripheral, Forearm, Right Updated:  09/02/19 1612     Blood Culture, Routine No Growth to date      No Growth to date      No Growth to date      No Growth to date    Blood culture [[id number]] Collected:  08/28/19 1250    Order Status:  Completed Specimen:  Blood from Peripheral, Hand, Right Updated:  09/02/19 1612     Blood Culture, Routine No growth after 5 days.    Blood culture [[id number]]  (Abnormal) Collected:  08/30/19 1450    Order Status:  Completed Specimen:  Blood from Peripheral, Hand, Left Updated:  09/02/19 0847     Blood Culture, Routine Gram stain lisa bottle: Gram positive cocci       Results called to and read back by:Torri Murdock RN 08/31/2019  16:23       METHICILLIN RESISTANT STAPHYLOCOCCUS AUREUS  ID consult required at [facility].  For susceptibility see order #[id number]      Fungus culture [[id number]]  (Abnormal) Collected:  08/26/19 1441    Order Status:  Completed Specimen:  Respiratory from Bronchial Wash Updated:  09/01/19 1236     Fungus (Mycology) Culture BRANDI GLABRATA  Many      Narrative:       Bronchial Wash    Blood culture [[id number]]  (Abnormal)  (Susceptibility) Collected:  08/28/19 1020    Order Status:  Completed Specimen:  Blood from Peripheral, Wrist, Right Updated:  08/31/19 1043     Blood Culture, Routine Gram stain aer bottle: Gram positive cocci in clusters resembling Staph      Results called to and read back by:Lashanda Rios RN 08/29/2019  17:38      METHICILLIN RESISTANT STAPHYLOCOCCUS AUREUS  ID consult required at [facility].      Blood culture #1 **CANNOT BE ORDERED STAT** [[id number]]  (Abnormal) Collected:  08/26/19 0848    Order Status:  Completed Specimen:  Blood from Line, Port A Cath Updated:  08/29/19 1111     Blood Culture, Routine Gram stain lisa bottle: Gram positive cocci in clusters resembling Staph       Results called to and read back by:Monet Martinez RN 08/27/2019  06:04      Gram stain aer bottle: Gram positive cocci in clusters resembling Staph       08/28/2019  10:29      METHICILLIN RESISTANT STAPHYLOCOCCUS AUREUS  ID consult required at [facility].  For susceptibility see order #[id number]      Blood culture #2 **CANNOT BE ORDERED STAT** [[id number]]  (Abnormal)  (Susceptibility) Collected:  08/26/19 0859    Order Status:  Completed Specimen:  Blood from Wrist, Right Updated:  08/29/19 1110     Blood Culture, Routine Gram stain aer bottle: Gram positive cocci in clusters resembling Staph       Results called to and read back by:Monet Martinez RN 08/27/2019 03:23      Gram stain lisa  bottle: Gram positive cocci in clusters resembling Staph       08/27/2019  11:38      METHICILLIN RESISTANT STAPHYLOCOCCUS AUREUS  ID consult required at [facility],[place],[facility] and [facility]   locations.      Culture, Respiratory [[id number]]  (Abnormal)  (Susceptibility) Collected:  08/26/19 1441    Order Status:  Completed Specimen:  Respiratory from Bronchial Wash Updated:  08/28/19 1213     Respiratory Culture METHICILLIN RESISTANT STAPHYLOCOCCUS AUREUS  Many       Gram Stain (Respiratory) <10 epithelial cells per low power field.     Gram Stain (Respiratory) Few WBC's     Gram Stain (Respiratory) Many Gram positive cocci    Narrative:       Bronchial Wash    AFB Culture & Smear [[id number]] Collected:  08/26/19 1441    Order Status:  Completed Specimen:  Respiratory from Bronchial Wash Updated:  08/27/19 2127     AFB Culture & Smear Culture in progress     AFB CULTURE STAIN No acid fast bacilli seen.    Narrative:       Bronchial Wash          I have reviewed all pertinent labs within the past 24 hours.            Assessment/Plan:     * Septic shock  Multifactorial given MRSA bacteremia and PNA. History of recurrent  pseudomonal infections and MRSA. Patient s/p PEA arrest 8/31.  Continue Vancomycin, Micafungin, VFend, and Zerbaxa.  Currently on multiple pressors to maintain MAPs >65. Wean pressors as tolerated.  Remains on ventilator, will continue supportive care.  Prognosis remains guarded.     S/P lung transplant  Underwent bilateral re-transplant for CARLOS EDUARDO on 6/18/19. Continue immunosuppression and prophylaxis.     Immunosuppression  Continue prednisone. Holding tacrolimus in setting of LISBETH. Daily tacrolimus levels.     Prophylactic antibiotic  Continue voriconazole. Holding dapsone and valcyte    Thrombocytopenia, unspecified  Multifactorial - medications, sepsis. Will give 1 unit platelets today and continue to trend.     Chronic pain with opiate use  Continue fentanyl and versed infusions.    CF  related Pancreatic insufficiency  Continue NPO.     LISBETH (acute kidney injury)  Nephrology following appreciate recs.     Acute respiratory failure  Reintubated 8/30 evening. Continue current vent settings, daily CXR/ABG.     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    Bacteremia  Right chest port removed. Repeat blood cultures with MRSA. Echo and CT head today. ID following, appreciate recs. Continue vancomycin, Zerbaxa, and Micafungin.     Arterial occlusion, lower extremity  Bilateral lower and upper extremities with severe mottling. Vascular surgery following, appreciate recs.        Preventive Measures: Nutrition: Goal: NPO, Stress Ulcer: continue prophyllaxis, DVT: discontinue prophyllaxis, Head of Bet: elevated, Reposition: per unit routine    Counseling/Consultation:I discussed the case with Dr. Alvarez.      Shama Canales PA-C  Lung Transplant  [facility]

## 2019-09-03 NOTE — PT/OT/SLP PROGRESS
Physical Therapy  Consult    Patient Name:  Juanita Ibarra   MRN:  [id number]  Admitting Diagnosis:  Septic shock   Recent Surgery: Procedure(s) (LRB):  EXPLORATION, WOUND - right chest (N/A) 6 Days Post-Op  Admit Date: 8/26/2019  Length of Stay: 8 days    Physical Therapy orders received and acknowledged. Patient is intubated/sedated. Attempting in AM, pt preparing for CT, PT unable to return in PM. PT to attempt when Juanita Ibarra is medically appropriate for progressive mobility.    Sudha Burgos PT, DPT  9/3/2019   Pager: [phone]

## 2019-09-03 NOTE — CARE UPDATE
BG goal 140 - 180   BG within goal ranges.     Continue low dose correction scale prn BG excursions  BG monitoring q 4 hrs while NPO     ** Please call Endocrine for any BG related issues **     Discharge planning: TBD

## 2019-09-03 NOTE — PLAN OF CARE
Exchanged right femoral double lumen dialysis catheter over guidewire. Patient tolerated procedure well. Consent was obtained prior to procedure. No CXR required to confirm placement. Able to draw back on both ports and flushed with 20cc NS without difficulty.

## 2019-09-03 NOTE — MEDICAL/APP STUDENT
[facility]  Nephrology  Consult Note    Patient Name: Juanita Ibarra  MRN: [id number]  Admission Date: 8/26/2019  Hospital Length of Stay: 8 days  Attending Provider: Jake Alvarez MD   Primary Care Physician: Primary Doctor No  Principal Problem:Septic shock    Inpatient consult to Wound Care  Consult performed by: Caitie Chapin  Consult ordered by: Jake Alvarez MD        Subjective:     HPI: A 28 y/o female s/p bilateral re-transplant (6/18/19) secondary to CF, who presented to the ED in acute respiratory distress. Patient's family member reports that she had and episode of fever 2 days ago accompanied by productive cough requiring increasing at home oxygen to ~5L. Also, caregiver reports poor appetite and PO intake over the last week. Upon arrival to ED, oxygen saturations were in mid 70s while on NRB and patient was placed on BiPAP which was transitioned to 25L comfort flow at 100% FiO2. On arrival to ED and worsening clinical status patient was transferred to the ICU, intubated, and started on Zerbaxa/Vancomycin.  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    Cystic fibrosis of the lung     Ear infection     Hypertension     Migraine headache     MRSA (methicillin resistant Staphylococcus aureus) carrier     Osteopenia     Other specified disease of pancreas     Pain disorder     Seizures 2013    Sinusitis, chronic     Vocal cord paralysis 06/2014    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       Past Surgical History:   Procedure Laterality Date    ABDOMINAL SURGERY      peg tube     AYACJESQ-ZTIKCIFXICV-KOQFSNRLIZUD N/A 5/19/2015    Performed by Deny Dias Jr[facility] at [facility] OR    BIOPSY-BRONCHUS N/A  12/20/2016    Performed by Jake [facility]varez MD at [facility] OR 2ND FLR    Bronchoscopy N/A 7/5/2019    Performed by Francisca Morin DO at [facility] OR 2ND FLR    BRONCHOSCOPY Bilateral 12/11/2018    Performed by Francisca Morin DO at [facility] OR 2ND FLR    BRONCHOSCOPY N/A 10/1/2018    Performed by Jake Alvarez MD at [facility] OR 2ND FLR    BRONCHOSCOPY Bilateral 12/20/2016    Performed by Jake Alvarez MD at [facility] OR 2ND FLR    BRONCHOSCOPY - flexible bronchoscopy with probable tissue biopsy CPT 02977 N/A 7/21/2015    Performed by Jake Alvarez MD at [facility] OR 2ND FLR    BRONCHOSCOPY - flexible bronchoscopy with probable tissue biospy CPT 20312 N/A 10/20/2015    Performed by Jake Alvarez MD at [facility] OR 2ND FLR    BRONCHOSCOPY - flexible bronchoscopy with tissue biopsy CPT 41351 N/A 9/29/2015    Performed by Jake Alvarez MD at [facility] OR 2ND FLR    BRONCHOSCOPY - flexible bronchoscopy with tissue biopsy CPT 72782 N/A 5/26/2015    Performed by Jake Alvarez MD at [facility] OR 1ST FLR    BRONCHOSCOPY flexible bronchoscopy with tissue biopsy N/A 9/8/2014    Performed by Jake Alvarez MD at [facility] OR 2ND FLR    BRONCHOSCOPY flexible with possible tissue biopsy N/A 8/8/2014    Performed by Jake Alvarez MD at [facility] OR 2ND FLR    BRONCHOSCOPY, BAL, BIOPSIES N/A 3/20/2018    Performed by Jake Alvarez MD at [facility] OR 2ND FLR    BRONCHOSCOPY-FIBEROPTIC N/A 1/29/2016    Performed by Joann Cifuentes DO at [facility] OR 2ND FLR    BRONCHOSCOPY; Bronchoscopy with BAL and transbronchial biopsies under general anesthesia N/A 5/29/2018    Performed by Jake Alvarez MD at [facility] OR 2ND FLR    CHOLECYSTECTOMY  2016    CHOLECYSTECTOMY-LAPAROSCOPIC N/A 7/22/2016    Performed by Joshua Goldberg, MD at [facility] OR 2ND FLR    COLONOSCOPY N/A 5/3/2019    Performed by Juancho Rich MD at [facility] ENDO (2ND FLR)    ENDOMETRIAL ABLATION  2015    KJB    ETHMOIDECTOMY Bilateral 4/9/2019    Performed by  Adin Burks III, MD at [facility] OR [address] [place] [address]    EXPLORATION, WOUND - right chest N/A 8/28/2019    [facility]    FESS      FESS Bilateral 10/21/2013    Performed by Jared Whatley MD at [facility] OR [address] [place] [address]    FESS, USING COMPUTER-ASSISTED NAVIGATION N/A 4/9/2019    Performed by Adin Burks III, MD at [facility] OR [address] [place] [address]    FINE NEEDLE ASPIRATION (FNA)  of a cervical lymphadenopathy  1/29/2016    Performed by Joann Cifuentes DO at [facility] OR [address] [place] [address]    flex bronchoscopy with probable tissue biopsy N/A 7/31/2014    Performed by [facility] Diagnostic Provider at [facility] OR [address] [place] [address]    flexible bronchoscopy with tissue biopsy N/A 12/11/2014    Performed by Jake Alvarez MD at [facility] OR [address] [place] [address]    flexible bronchoscopy with tissue biopsy  CPT 32081 N/A 8/15/2019    Performed by Jake Alvarez MD at [facility] OR [address] [place] [address]    flexible bronchoscopy with tissue biopsy CPT 09259  N/A 7/26/2019    Performed by Jake Alvarez MD at [facility] OR [address] [place] [address]    HYSTERECTOMY  04/2017    [facility]    INJECTION-VOCAL CORD Left 6/24/2014    Performed by Jared Whatley MD at [facility] OR [address] [place] [address]    LPYACWQMT-HHCV-O-CATH to right chest and removal of portacath to left chests wall. Bilateral 6/24/2014    Performed by Zhen Dorado MD at [facility] OR [address] [place] [address]    LARYNX SURGERY  2016    LUNG TRANSPLANT Bilateral 6/12/14    MAXILLARY ANTROSTOMY  4/9/2019    Performed by Adin Burks III, MD at [facility] OR [address] [place] [address]    MYRINGOTOMY W/ TUBES Right 04/2017    MYRINGOTOMY WITH INSERTION OF PE TUBES Right 4/15/2017    Per[facility] Gary Null [facility]    PLACEMENT-TUBE-PEG  5/15/2014    [facility]    PORTACATH PLACEMENT Right     rt sc    REDO BILATERAL LUNG TRANSPLANT; CLAMSHELL Bilateral 6/18/2019    Performed by Jonathan Newby MD at [facility] OR [address] [place] [address]    REMOVAL-TUBE-PEG  5/15/2014    Performed by David Moses MD at [facility] OR [address] [place] [address]     RHC for lung re-transplant N/A 1/22/2019    Performed by Laura Rachel MD at [facility] CATH LAB    ROBOTIC ASSISTED LAPAROSCOPIC HYSTERECTOMY N/A 4/25/2017    Performed by Deny Dias Jr., MD at [facility] OR    SALPINGECTOMY Bilateral 2015    KJB    SALPINGECTOMY-LAPAROSCOPIC Bilateral 5/19/2015    Performed by Deny Dias Jr., MD at [facility] OR    SINUS SURGERY FUNCTIONAL ENDOSCOPIC WITH NAVIGATION Bilateral 4/3/2017    Performed by Cesar Olsen MD at [facility] OR [address] [place] [address]    SINUS SURGERY FUNCTIONAL ENDOSCOPIC WITH NAVIGATION, 50044, 90229, 84658, 34750 Bilateral 2/5/2015    [facility]    SPHENOIDECTOMY Bilateral 4/9/2019    Performed by Adin Burks III, MD at [facility] OR [address] [place] [address]    THYROPLASTY - Medialization laryngoplasty, cricothyroid subluxation, arytenoid repositioning Left 8/2/2016    [facility]    TRANSPLANT-LUNG Bilateral 6/11/2014    Performed by Adolfo Huston MD at [facility] OR [address] [place] [address]       Review of patient's allergies indicates:   Allergen Reactions    Albuterol Palpitations    Colistin Anaphylaxis    Vancomycin analogues      Infusion reaction that does not resolve with slowing  Pt. States she can tolerate it when given with 50 mg Benadryl and ran over 3 hours    Neupogen [filgrastim] Other (See Comments)     Ostealgia after five daily doses of 300 mcg.      Bactrim [sulfamethoxazole-trimethoprim] Hives    Ceftazidime Hives     Pt stated can tolerate cefapine not ceftazidime    Ceftazidime     Dronabinol Other (See Comments)     Mental changes/hallucinations    Haldol [haloperidol lactate] Other (See Comments)     Seizure like activity    Nsaids (non-steroidal anti-inflammatory drug)      Cannot have due to lung transplant    Adhesive Rash     Cloth tape- please use tegaderm or paper tape    Aztreonam Rash    Ciprofloxacin Nausea And Vomiting     Projectile N/V, per patient.  Unwilling to retry  therapy.     Current Facility-Administered Medications   Medication Frequency    0.9%  NaCl infusion (CRRT USE ONLY) Continuous    0.9%  NaCl infusion (for blood administration) Q24H PRN    alteplase injection 2 mg Once    alteplase injection 2 mg Once    carboxymethylcellulose sodium 1 % DpGe TID    chlorhexidine 0.12 % solution 15 mL BID    dextrose 50% injection 12.5 g PRN    diphenhydrAMINE 12.5 mg/5 mL elixir 25 mg Daily PRN    diphenhydrAMINE injection 50 mg Q6H PRN    fentaNYL 2500 mcg in 0.9% sodium chloride 250 mL infusion premix (titrating) Continuous    glucagon (human recombinant) injection 1 mg PRN    heparin 25,000 units in dextrose 5% 250 mL (100 units/mL) infusion (heparin infusion) Continuous    hydrocortisone sodium succinate injection 50 mg Q6H    HYDROmorphone injection 1 mg Q6H PRN    insulin aspart U-100 pen 0-5 Units Q6H PRN    levalbuterol nebulizer solution 1.25 mg Q8H    LORazepam tablet 1 mg Q8H PRN    magnesium sulfate 2g in water 50mL IVPB (premix) PRN    metoprolol injection 10 mg Q5 Min PRN    micafungin 100 mg in sodium chloride 0.9 % 100 mL IVPB (ready to mix system) Q24H    midazolam (VERSED) 1 mg/mL in dextrose 5 % 100 mL infusion (non-titrating) Continuous    norepinephrine 16 mg in dextrose 5 % 250 mL infusion Continuous    ondansetron injection 8 mg Q6H PRN    oxyCODONE immediate release tablet Tab 10 mg Q8H PRN    pantoprazole injection 40 mg Daily    promethazine (PHENERGAN) 12.5 mg in dextrose 5 % 50 mL IVPB Q6H PRN    sodium chloride 0.9% bolus 1,000 mL Once    sodium chloride 0.9% bolus 500 mL Once    sodium phosphate 20.01 mmol in dextrose 5 % 250 mL IVPB PRN    sodium phosphate 30 mmol in dextrose 5 % 250 mL IVPB PRN    sodium phosphate 39.99 mmol in dextrose 5 % 250 mL IVPB PRN    vasopressin (PITRESSIN) 0.2 Units/mL in dextrose 5 % 100 mL infusion Continuous    voriconazole (VFEND) 200 mg in dextrose 5 % 100 mL IVPB Q12H     Family  History     Problem Relation (Age of Onset)    Cancer Maternal Grandmother    Diabetes Maternal Grandfather    Drug abuse Maternal Grandfather    Hypertension Maternal Grandmother    Thrombocytopenia Maternal Grandfather        Tobacco Use    Smoking status: Never Smoker    Smokeless tobacco: Never Used   Substance and Sexual Activity    Alcohol use: No     Comment: Has not had an alcoholic drink in more than 2 months.    Drug use: No    Sexual activity: Yes     Partners: Male     Birth control/protection: See Surgical Hx     Comment: current partner since Oct 2016     Unable to conduct a ROS- patient remains intubated    Objective:     Vital Signs (Most Recent):  Temp: 99.1 °F (37.3 °C) (09/03/19 1300)  Pulse: 106 (09/03/19 1300)  Resp: (!) 30 (09/03/19 0800)  BP: (!) 89/50 (09/03/19 1300)  SpO2: 96 % (09/03/19 1300)  O2 Device (Oxygen Therapy): ventilator (09/03/19 1115) Vital Signs (24h Range):  Temp:  [98.1 °F (36.7 °C)-99.5 °F (37.5 °C)] 99.1 °F (37.3 °C)  Pulse:  [106-123] 106  Resp:  [25-30] 30  SpO2:  [91 %-97 %] 96 %  BP: ()/(50-75) 89/50  Arterial Line BP: ()/() 118/48     Weight: 72 kg (158 lb 11.7 oz) (09/03/19 0400)  Body mass index is 31 kg/m².  Body surface area is 1.75 meters squared.    I/O last 3 completed shifts:  In: 25177.2 [I.V.:9989.2; NG/GT:20]  Out: 8333 [Drains:300; Other:8033]    Physical Exam   Constitutional: She appears well-developed and well-nourished.   HENT:   Head: Normocephalic and atraumatic.   Eyes: Pupils are equal, round, and reactive to light. Conjunctivae and EOM are normal.   Neck: No JVD present.   Cardiovascular: Normal rate, regular rhythm, normal heart sounds and intact distal pulses. Exam reveals no friction rub.   No murmur heard.  Pulmonary/Chest: Effort normal and breath sounds normal. No respiratory distress. She has no wheezes. She has no rales.   Abdominal: Soft. Bowel sounds are normal. She exhibits no distension.   Musculoskeletal: She  exhibits edema.   Bilateral pitting edema of lower limbs   Skin: Skin is warm. Capillary refill takes less than 2 seconds. No erythema.     Significant Labs:  Lab Results   Component Value Date    WBC 9.86 09/03/2019    HGB 7.4 (L) 09/03/2019    HCT 23.0 (L) 09/03/2019    MCV 88 09/03/2019    PLT 53 (L) 09/03/2019     BMP  Lab Results   Component Value Date     (L) 09/03/2019    K 5.7 (H) 09/03/2019    CL 93 (L) 09/03/2019    CO2 20 (L) 09/03/2019    BUN 22 (H) 09/03/2019    CREATININE 1.6 (H) 09/03/2019    CALCIUM 6.6 (LL) 09/03/2019    ANIONGAP 16 09/03/2019    ESTGFRAFRICA 49.5 (A) 09/03/2019    EGFRNONAA 42.9 (A) 09/03/2019     Significant Imaging:  CT Head Without Contrast  Narrative: EXAMINATION:  CT HEAD WITHOUT CONTRAST    CLINICAL HISTORY:  focal neuro deficit;    TECHNIQUE:  Low dose axial CT images obtained throughout the head without the use of intravenous contrast.  Axial, sagittal and coronal reconstructions were performed.    COMPARISON:  CT head without contrast 08/11/2018    FINDINGS:  Evaluation is degraded motion artifact and suboptimal patient position.  There is slight loss of gray-white matter differentiation in the cerebral hemispheres which may be artifactual however cannot exclude anoxic/hypoxic injury and or cerebral edema.  There is continued small caliber ventricular system without hydrocephalus.  Allowing for limitations there is no evidence for acute intracranial hemorrhage.  Clinical correlation and further evaluation with MRI as warranted if patient compatible.    There is soft tissue swelling induration overlying the left calvarium without gross underlying fracture.  Partial opacification mastoid air cells bilaterally with opacification visualized sphenoid sinus.    Case discussed with 09/03/2019 FELICIA Canales at 11:00  Impression: Interval slight poor gray-white matter differentiation cerebral hemispheres which is concerning for possible edema component of anoxic hypoxic injury  cannot be excluded.  Clinical correlation and correlation with MRI if patient compatible.    Soft tissue swelling overlying the left left parietal temporal calvarium which may represent subcutaneous hematoma without gross underlying fracture.    Opacification of the bilateral mastoid air cells and ethmoid air cells    Electronically signed by resident: Thania Mckoy  Date:    09/03/2019  Time:    10:17    Electronically signed by: Javi Lee DO  Date:    09/03/2019  Time:    11:22  Echo Color Flow Doppler? Yes  · Technically difficult study  · Mildly decreased left ventricular systolic function. The estimated   ejection fraction is 45-50%.  · Low normal right ventricular systolic function.  · Mild tricuspid regurgitation.  · The estimated PA systolic pressure is 30 mm Hg  · Normal central venous pressure (3 mm Hg).     X-Ray Chest 1 View  Narrative: EXAMINATION:  XR CHEST 1 VIEW    CLINICAL HISTORY:  intubated;    COMPARISON:  Comparison is made to 09/02/2019.    FINDINGS:  Endotracheal tube tip lies at the level of the aortic arch, adequately above the ness.  Distal aspect of the enteric tube lies distal to the GE junction, the tube tip projected inferior to the imaged volume.  Left jugular origin vascular catheter tip lies at the junction of the right and left brachiocephalic veins.  Postoperative changes again noted in the thorax.  Heart size is normal and the cardiomediastinal silhouette is unchanged since the prior exam referenced above.  Lung zones are stable, with multifocal patchy areas of airspace consolidation noted but with no significant new airspace consolidation or volume loss evident.  Pleural fluid is again seen on the right, stable in volume since the prior exam.  No pneumothorax.  Impression: No significant detrimental interval change in the appearance of the chest since 09/02/2019.    Electronically signed by: Pradip Caraballo MD  Date:    09/03/2019  Time:    07:01    Assessment/Plan:     Active  Diagnoses:    Diagnosis Date Noted POA    PRINCIPAL PROBLEM:  Septic shock [A41.9, R65.21] 08/26/2019 Yes    Limb ischemia [I99.8]  No    Ischemic leg [I99.8] 09/01/2019 No    Arterial occlusion, lower extremity [I70.209] 09/01/2019 No    Respiratory acidosis [E87.2]  No    Sinus tachycardia [R00.0]  No    Metabolic acidosis [E87.2]  Yes    Bacteremia [R78.81] 08/28/2019 No    Alteration in skin integrity related to surgical incision [R23.9] 08/27/2019 Yes    Preventive measure [Z29.9] 08/27/2019 Not Applicable    Acute respiratory failure [J96.00] 08/26/2019 Yes    Alteration in skin integrity [R23.9] 08/26/2019 Yes    Thrombocytopenia, unspecified [D69.6] 12/04/2018 No    S/P lung transplant [Z94.2] 10/24/2015 Not Applicable    LISBETH (acute kidney injury) [N17.9] 08/14/2015 Yes    Prophylactic antibiotic [Z79.2] 06/30/2014 Not Applicable    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 The patient was found to be febrile (101 F), tachycardic,and hypotensive suggestive of end organ damage to the kidneys in the context of septic shock. Subsequent blood cultures were positive for MRSA, respiratory cultures were positive for candida albicans, candida glabrata, and pseudomonas aeruginosa confirming an infectious component for this presentation. The patient was receiving SLED, however, the port has clogged therefore preventing SLED form being administered.  Additionally, she remains anuric with a metabolic acidosis and electrolyte abnormalities (Na+=129, K+=5.7, HCO3=20). For these reasons, CRRT is indicated once access is established.     Plan:    Acute Kidney Injury  -Creatinine increased to 1.6 today from 1.5 yesterday  -Patient was receiving SLED for toxin and volume clearance which was stopped due to complications with the catheter   -Recommending continuing SLED at a flow rate of 150 mL/min and UF of 300-400 mL/hr once a new port has been established  -Patient is not hemodynamically stable for HD  -Patient remains anuric despite ongoing CRRT  -Avoid nephrotoxins   -Strict Is/Os  -Renally dose meds    Hyperkalemia  -K+=5.7 (increased from 5.5)- continue to monitor K+   -Start CRRT once a new port has been placed in order to reduce serum K+, as elevated potassium can have serious effects    Metabolic Acidosis   -Continue to monitor with ABGs   -CRRT    Thank you for your consult. I will follow-up with the patient directly. Please contact if any questions or concerns.    Caitie Chapin  Nephrology  [facility]

## 2019-09-03 NOTE — PROGRESS NOTES
Skin integrity:     Pt with DTI to forehead, skin tear to R lower lateral leg (foam applied), blister to R lower medial leg (foam applied), stage 1. Possible DTI to sacrum, black fingers and toes to all extremities (Vascular has seen pt, warm packs applied, weaning down pressors), wound care consulted.

## 2019-09-03 NOTE — PLAN OF CARE
Problem: Adult Inpatient Plan of Care  Goal: Plan of Care Review  Pt remains intubated A/C 40% and PEEP 5, sats >92%, ABG q4h and chest ray obtained this am. Versed and Fentanyl gtt turned ofgf overnight, pt remains unarousable, does not withdraw to pain, pupils remain 3 brisk and reactive. Levo weaned down to 0.2 mcg/kg/min, Vaso @0.04 units/min. Extremities with necrosis, R leg worse than L, No acute events overnight, all questions answered and addressed.

## 2019-09-03 NOTE — PLAN OF CARE
Exchanged right femoral double lumen dialysis catheter over guidewire. Patient tolerated procedure well. Consent was obtained prior to procedure. No CXR required to confirm placement. Unable to draw back on both ports, but able to flush with 20cc NS.

## 2019-09-03 NOTE — PLAN OF CARE
Problem: Adult Inpatient Plan of Care  Goal: Plan of Care Review  Outcome: Ongoing (interventions implemented as appropriate)  Dx: Septic shock, bilateral lung transplant     Shift Events: VSS throughout shift. O2 Sats > 92. Vent settings @ 40 and 5. Pt remains anuric. CRRT restarted on shift. Wound care and wound vac exchange done. 50 cc output from NG tube. CT of head and echo of heart completed.     Neuro: unresponsive     Vital Signs: BP (!) 94/59   Pulse 102   Temp 98.1 °F (36.7 °C)   Resp (!) 30   Ht 5' (1.524 m)   Wt 72 kg (158 lb 11.7 oz)   LMP 10/02/2016 (LMP Unknown)   SpO2 (!) 94%   Breastfeeding? No   BMI 31.00 kg/m²     Diet: NPO    Gtts: Vasop @ 0.04 and Levo @ 0.16     Urine Output: anuric     Drains: wound vac 0 output

## 2019-09-03 NOTE — PROGRESS NOTES
[facility]  Infectious Disease  Progress Note    Patient Name: Juanita Ibarra  MRN: [id number]  Admission Date: 8/26/2019  Length of Stay: 8 days  Attending Physician: Jake Alvarez MD  Primary Care Provider: Primary Doctor No    Isolation Status: Contact and Neutropenic  Assessment/Plan:      * Septic shock  30yo woman w/a history of CF (c/b pancreatic insufficiency, sinus disease, MRSA/Pseudomonas colonization c/b numerous antibiotics allergies, and subsequent COPD/ESLD; s/p BOLT 6/12/2014, CMV D+/R-, simulect induction, on maintenance tacro/MMF/pred; c/b multiple episodes of MRSA/Pseudomonas pneumonia/sinusitis, C.glabrata early post-operative colonization 7/2014, A1 rejection 8/2014 s/p pulse SM, prior CMV reactivations, and subsequent grade 3 CARLOS EDUARDO; s/p redo BOLT 6/18/2019, CMV D-/R+, basiliximab induction, on maintenance tacro/MMF/pred; c/b  Pseudomonas and C.glabrata recipient derived post-transplant pneumonia s/p zerbaxa/micafungin course and invasive aspergillosis on chronic voriconazole) who was admitted on 8/26/2019 with 2 days of fevers, worsening SOB, productive cough, and hypoxic respiratory failure/septic shock due to MRSA pneumonia with associated septicemia (requiring pressors, intubation, and CRRT; associated with RIJ thrombus and port, s/p removal). She suffered a profound decompensation on 8/30 with shock requiring max doses of multiple pressors, reintubation with paralysis/pronation, possible anemia of blood loss, and acidosis culminating in PEA arrest requiring 1 round of CPR despite empiric broadening of coverage to include vanc, zerbaxa, tobramycin, vori/micafungin -- ultimately found to have large RA, SVC, and RIJ thrombus as precipitant for hemodynamic instability on imaging. She remains critically ill with some hemodynamic improvement on vancomycin and anticoagulation.    - would continue vancomycin (goal trough 15-20) for MRSA septicemia, infected  thrombus, and pneumonia  - would continue voriconazole for prior invasive aspergillosis; await BAL antigen from recent bronch; repeat level  - continue micafungin for c. Glabrata on BAL from 8/26  - OK to space out blood culture draws - suspect prolonged bacteremia in patient with extremely high clot burden w/ cardiac and large vessel involvement, clearance will be delayed if it is attainable  - continue to monitor all extremities for signs of developing wet gangrene - extensive areas of mottling and necrosis are present   - extent of neurologic injury is still to be determined    Will follow      Anticipated Disposition: TBD    Thank you for your consult. I will follow-up with patient. Please contact us if you have any additional questions.      Agatha Smith DO  Transplant ID  Infectious Disease Fellow  C: [phone]  P: [phone]      Subjective:     Principal Problem:Septic shock    HPI: No notes on file  Interval History: no events, blood cultures 9/2 are positive.     Review of Systems   Unable to perform ROS: Intubated     Objective:     Vital Signs (Most Recent):  Temp: 98.1 °F (36.7 °C) (09/03/19 1800)  Pulse: 102 (09/03/19 1800)  Resp: (!) 30 (09/03/19 1600)  BP: (!) 94/59 (09/03/19 1800)  SpO2: (!) 94 % (09/03/19 1800) Vital Signs (24h Range):  Temp:  [98.1 °F (36.7 °C)-99.5 °F (37.5 °C)] 98.1 °F (36.7 °C)  Pulse:  [102-121] 102  Resp:  [26-30] 30  SpO2:  [91 %-100 %] 94 %  BP: ()/(50-75) 94/59  Arterial Line BP: ()/() 136/51     Weight: 72 kg (158 lb 11.7 oz)  Body mass index is 31 kg/m².    Estimated Creatinine Clearance: 40.5 mL/min (A) (based on SCr of 1.8 mg/dL (H)).    Physical Exam   Constitutional: She appears well-developed. She has a sickly appearance. She appears distressed.   Intubated, sedated, paralyzed, on pressors, CRRT.   HENT:   Head: Normocephalic.   Neck: No tracheal deviation present.   Cardiovascular: Regular rhythm. Tachycardia present.   No murmur  heard.  Pulmonary/Chest: She is in respiratory distress. She has decreased breath sounds in the right lower field. She has no wheezes. She has rhonchi in the right upper field, the right middle field, the left upper field and the left middle field. She has no rales.   Intubated, paralyzed.   Abdominal: She exhibits no distension. There is no tenderness.   Genitourinary:   Genitourinary Comments: saleh   Musculoskeletal: She exhibits edema. She exhibits no deformity.   Neurological:   Paralyzed, sedated.   Skin: Skin is warm and dry. Rash noted. There is pallor.   Mottling most prominent in RLE.   Nursing note and vitals reviewed.      Significant Labs:   CBC:   Recent Labs   Lab 09/03/19  0300 09/03/19  0756 09/03/19  1058 09/03/19  1253 09/03/19  1542   WBC 15.30* 13.34*  --  9.86  --    HGB 9.1* 9.1*  --  7.4*  --    HCT 27.6* 28.6* 27* 23.0* 25*   PLT 19* 14*  --  53*  --      CMP:   Recent Labs   Lab 09/02/19  0754  09/02/19  1910 09/02/19  2320 09/03/19  0300 09/03/19  0756 09/03/19  1326   NA  --    < >  --  127* 129*  --  125*  125*   K  --    < >  --  5.5* 5.7*  --  5.3*  5.3*   CL  --    < >  --  94* 93*  --  89*  89*   CO2  --    < >  --  20* 20*  --  22*  22*   GLU  --    < >  --  128* 128*  --  192*  192*   BUN  --    < >  --  21* 22*  --  26*  26*   CREATININE  --    < >  --  1.5* 1.6*  --  1.8*  1.8*   CALCIUM  --    < >  --  6.2* 6.6*  --  6.8*  6.8*   PROT 4.1*  --  4.3*  --   --  4.6*  --    ALBUMIN 1.1*   < > 1.1* 1.0* 1.0* 1.1* 1.2*  1.2*   BILITOT 1.0  --  1.4*  --   --  2.0*  --    ALKPHOS 567*  --  662*  --   --  666*  --    AST 1,015*  --  903*  --   --  656*  --    *  --  435*  --   --  375*  --    ANIONGAP  --    < >  --  13 16  --  14  14   EGFRNONAA  --    < >  --  46.4* 42.9*  --  37.2*  37.2*    < > = values in this interval not displayed.       Significant Imaging: I have reviewed all pertinent imaging results/findings within the past 24 hours.

## 2019-09-03 NOTE — ASSESSMENT & PLAN NOTE
28yo woman w/a history of CF (c/b pancreatic insufficiency, sinus disease, MRSA/Pseudomonas colonization c/b numerous antibiotics allergies, and subsequent COPD/ESLD; s/p BOLT 6/12/2014, CMV D+/R-, simulect induction, on maintenance tacro/MMF/pred; c/b multiple episodes of MRSA/Pseudomonas pneumonia/sinusitis, C.glabrata early post-operative colonization 7/2014, A1 rejection 8/2014 s/p pulse SM, prior CMV reactivations, and subsequent grade 3 CARLOS EDUARDO; s/p redo BOLT 6/18/2019, CMV D-/R+, basiliximab induction, on maintenance tacro/MMF/pred; c/b  Pseudomonas and C.glabrata recipient derived post-transplant pneumonia s/p zerbaxa/micafungin course and invasive aspergillosis on chronic voriconazole) who was admitted on 8/26/2019 with 2 days of fevers, worsening SOB, productive cough, and hypoxic respiratory failure/septic shock due to MRSA pneumonia with associated septicemia (requiring pressors, intubation, and CRRT; associated with RIJ thrombus and port, s/p removal). She suffered a profound decompensation on 8/30 with shock requiring max doses of multiple pressors, reintubation with paralysis/pronation, possible anemia of blood loss, and acidosis culminating in PEA arrest requiring 1 round of CPR despite empiric broadening of coverage to include vanc, zerbaxa, tobramycin, vori/micafungin -- ultimately found to have large RA, SVC, and RIJ thrombus as precipitant for hemodynamic instability on imaging. She remains critically ill with some hemodynamic improvement on vancomycin and anticoagulation.    - would continue vancomycin (goal trough 15-20) for MRSA septicemia, infected thrombus, and pneumonia  - would continue voriconazole for prior invasive aspergillosis; await BAL antigen from recent bronch; repeat level  - continue micafungin for c. Glabrata on BAL from 8/26  - OK to space out blood culture draws - suspect prolonged bacteremia in patient with extremely high clot burden w/ cardiac and large vessel involvement,  clearance will be delayed if it is attainable  - continue to monitor all extremities for signs of developing wet gangrene - extensive areas of mottling and necrosis are present   - extent of neurologic injury is still to be determined    Will follow

## 2019-09-03 NOTE — SUBJECTIVE & OBJECTIVE
Interval History: no events, blood cultures 9/2 are positive.     Review of Systems   Unable to perform ROS: Intubated     Objective:     Vital Signs (Most Recent):  Temp: 98.1 °F (36.7 °C) (09/03/19 1800)  Pulse: 102 (09/03/19 1800)  Resp: (!) 30 (09/03/19 1600)  BP: (!) 94/59 (09/03/19 1800)  SpO2: (!) 94 % (09/03/19 1800) Vital Signs (24h Range):  Temp:  [98.1 °F (36.7 °C)-99.5 °F (37.5 °C)] 98.1 °F (36.7 °C)  Pulse:  [102-121] 102  Resp:  [26-30] 30  SpO2:  [91 %-100 %] 94 %  BP: ()/(50-75) 94/59  Arterial Line BP: ()/() 136/51     Weight: 72 kg (158 lb 11.7 oz)  Body mass index is 31 kg/m².    Estimated Creatinine Clearance: 40.5 mL/min (A) (based on SCr of 1.8 mg/dL (H)).    Physical Exam   Constitutional: She appears well-developed. She has a sickly appearance. She appears distressed.   Intubated, sedated, paralyzed, on pressors, CRRT.   HENT:   Head: Normocephalic.   Neck: No tracheal deviation present.   Cardiovascular: Regular rhythm. Tachycardia present.   No murmur heard.  Pulmonary/Chest: She is in respiratory distress. She has decreased breath sounds in the right lower field. She has no wheezes. She has rhonchi in the right upper field, the right middle field, the left upper field and the left middle field. She has no rales.   Intubated, paralyzed.   Abdominal: She exhibits no distension. There is no tenderness.   Genitourinary:   Genitourinary Comments: saleh   Musculoskeletal: She exhibits edema. She exhibits no deformity.   Neurological:   Paralyzed, sedated.   Skin: Skin is warm and dry. Rash noted. There is pallor.   Mottling most prominent in RLE.   Nursing note and vitals reviewed.      Significant Labs:   CBC:   Recent Labs   Lab 09/03/19  0300 09/03/19  0756 09/03/19  1058 09/03/19  1253 09/03/19  1542   WBC 15.30* 13.34*  --  9.86  --    HGB 9.1* 9.1*  --  7.4*  --    HCT 27.6* 28.6* 27* 23.0* 25*   PLT 19* 14*  --  53*  --      CMP:   Recent Labs   Lab 09/02/19  0754   09/02/19  1910 09/02/19  2320 09/03/19  0300 09/03/19  0756 09/03/19  1326   NA  --    < >  --  127* 129*  --  125*  125*   K  --    < >  --  5.5* 5.7*  --  5.3*  5.3*   CL  --    < >  --  94* 93*  --  89*  89*   CO2  --    < >  --  20* 20*  --  22*  22*   GLU  --    < >  --  128* 128*  --  192*  192*   BUN  --    < >  --  21* 22*  --  26*  26*   CREATININE  --    < >  --  1.5* 1.6*  --  1.8*  1.8*   CALCIUM  --    < >  --  6.2* 6.6*  --  6.8*  6.8*   PROT 4.1*  --  4.3*  --   --  4.6*  --    ALBUMIN 1.1*   < > 1.1* 1.0* 1.0* 1.1* 1.2*  1.2*   BILITOT 1.0  --  1.4*  --   --  2.0*  --    ALKPHOS 567*  --  662*  --   --  666*  --    AST 1,015*  --  903*  --   --  656*  --    *  --  435*  --   --  375*  --    ANIONGAP  --    < >  --  13 16  --  14  14   EGFRNONAA  --    < >  --  46.4* 42.9*  --  37.2*  37.2*    < > = values in this interval not displayed.       Significant Imaging: I have reviewed all pertinent imaging results/findings within the past 24 hours.

## 2019-09-03 NOTE — SUBJECTIVE & OBJECTIVE
Subjective:     Interval History: No acute events overnight. Remains intubated and sedated with versed and fentanyl. Oxygenation stable. Remains anuric on CRRT. Off angiotension II since the weekend. On levo/vaso.     Continuous Infusions:   sodium chloride 0.9%      fentanyl Stopped (09/03/19 0400)    heparin (porcine) in 5 % dex 300 Units/hr (09/03/19 0600)    midazolam (VERSED) infusion (non-titrating) Stopped (09/02/19 2300)    norepinephrine bitartrate-D5W 0.2 mcg/kg/min (09/03/19 0800)    vasopressin (PITRESSIN) infusion 0.04 Units/min (09/03/19 0800)     Scheduled Meds:   carboxymethylcellulose sodium   Both Eyes TID    chlorhexidine  15 mL Mouth/Throat BID    hydrocortisone sodium succinate  50 mg Intravenous Q6H    levalbuterol  1.25 mg Nebulization Q8H    micafungin (MYCAMINE) IVPB  100 mg Intravenous Q24H    pantoprazole  40 mg Intravenous Daily    sodium chloride 0.9%  1,000 mL Intravenous Once    sodium chloride 0.9%  500 mL Intravenous Once    vancomycin (VANCOCIN) IVPB (custom)  750 mg Intravenous Once    vorconazole (VFEND) IVPB  4 mg/kg Intravenous Q12H     PRN Meds:sodium chloride, dextrose 50%, diphenhydrAMINE, diphenhydrAMINE, glucagon (human recombinant), HYDROmorphone, insulin aspart U-100, LORazepam, magnesium sulfate IVPB, metoprolol, ondansetron, oxyCODONE, promethazine (PHENERGAN) IVPB, sodium phosphate IVPB, sodium phosphate IVPB, sodium phosphate IVPB    Review of patient's allergies indicates:   Allergen Reactions    Albuterol Palpitations    Colistin Anaphylaxis    Vancomycin analogues      Infusion reaction that does not resolve with slowing  Pt. States she can tolerate it when given with 50 mg Benadryl and ran over 3 hours    Neupogen [filgrastim] Other (See Comments)     Ostealgia after five daily doses of 300 mcg.      Bactrim [sulfamethoxazole-trimethoprim] Hives    Ceftazidime Hives     Pt stated can tolerate cefapine not ceftazidime    Ceftazidime      Dronabinol Other (See Comments)     Mental changes/hallucinations    Haldol [haloperidol lactate] Other (See Comments)     Seizure like activity    Nsaids (non-steroidal anti-inflammatory drug)      Cannot have due to lung transplant    Adhesive Rash     Cloth tape- please use tegaderm or paper tape    Aztreonam Rash    Ciprofloxacin Nausea And Vomiting     Projectile N/V, per patient.  Unwilling to retry therapy.       Review of Systems   Unable to perform ROS: Intubated     Subjective:     Interval History: No acute events overnight. Remains intubated and sedated. Oxygenation improved today and FiO2 was decreased to 50%. Remains anuric on CRRT. Pressor requirements have decreased and is now off angiotension II.     Continuous Infusions:   sodium chloride 0.9%      fentanyl Stopped (09/03/19 0400)    heparin (porcine) in 5 % dex 300 Units/hr (09/03/19 0600)    midazolam (VERSED) infusion (non-titrating) Stopped (09/02/19 2300)    norepinephrine bitartrate-D5W 0.2 mcg/kg/min (09/03/19 0800)    vasopressin (PITRESSIN) infusion 0.04 Units/min (09/03/19 0800)     Scheduled Meds:   carboxymethylcellulose sodium   Both Eyes TID    chlorhexidine  15 mL Mouth/Throat BID    hydrocortisone sodium succinate  50 mg Intravenous Q6H    levalbuterol  1.25 mg Nebulization Q8H    micafungin (MYCAMINE) IVPB  100 mg Intravenous Q24H    pantoprazole  40 mg Intravenous Daily    sodium chloride 0.9%  1,000 mL Intravenous Once    sodium chloride 0.9%  500 mL Intravenous Once    vancomycin (VANCOCIN) IVPB (custom)  750 mg Intravenous Once    vorconazole (VFEND) IVPB  4 mg/kg Intravenous Q12H     PRN Meds:sodium chloride, dextrose 50%, diphenhydrAMINE, diphenhydrAMINE, glucagon (human recombinant), HYDROmorphone, insulin aspart U-100, LORazepam, magnesium sulfate IVPB, metoprolol, ondansetron, oxyCODONE, promethazine (PHENERGAN) IVPB, sodium phosphate IVPB, sodium phosphate IVPB, sodium phosphate IVPB    Review of  patient's allergies indicates:   Allergen Reactions    Albuterol Palpitations    Colistin Anaphylaxis    Vancomycin analogues      Infusion reaction that does not resolve with slowing  Pt. States she can tolerate it when given with 50 mg Benadryl and ran over 3 hours    Neupogen [filgrastim] Other (See Comments)     Ostealgia after five daily doses of 300 mcg.      Bactrim [sulfamethoxazole-trimethoprim] Hives    Ceftazidime Hives     Pt stated can tolerate cefapine not ceftazidime    Ceftazidime     Dronabinol Other (See Comments)     Mental changes/hallucinations    Haldol [haloperidol lactate] Other (See Comments)     Seizure like activity    Nsaids (non-steroidal anti-inflammatory drug)      Cannot have due to lung transplant    Adhesive Rash     Cloth tape- please use tegaderm or paper tape    Aztreonam Rash    Ciprofloxacin Nausea And Vomiting     Projectile N/V, per patient.  Unwilling to retry therapy.       Review of Systems   Unable to perform ROS: Intubated     Objective:   Physical Exam   Constitutional: She appears well-developed and well-nourished. She appears toxic. She is sedated and intubated.   HENT:   Head: Normocephalic and atraumatic.   ET and OG tubes in place   Eyes: Right eye exhibits no discharge. Left eye exhibits no discharge. No scleral icterus.   Neck: Neck supple.   Cardiovascular: Regular rhythm. Tachycardia present.   Pulmonary/Chest: She is intubated. No respiratory distress. She has no wheezes. She has rales.   Mechanical breath sounds   Abdominal: Soft. Bowel sounds are normal.   Musculoskeletal: She exhibits edema (generalized). She exhibits no tenderness or deformity.   Skin: There is pallor.   Bilateral lower extremity mottling (right leg extends to mid-thigh, well demarcated), bilateral hands and digits mottled, cool to touch   Nursing note and vitals reviewed.        Vital Signs (Most Recent):  Temp: 99.3 °F (37.4 °C) (09/03/19 0908)  Pulse: 110 (09/03/19  0908)  Resp: (!) 26 (09/02/19 2303)  BP: (!) 123/51 (09/03/19 0900)  SpO2: 97 % (09/03/19 0908) Vital Signs (24h Range):  Temp:  [98.1 °F (36.7 °C)-99.3 °F (37.4 °C)] 99.3 °F (37.4 °C)  Pulse:  [108-128] 110  Resp:  [25-26] 26  SpO2:  [91 %-99 %] 97 %  BP: ()/(51-75) 123/51  Arterial Line BP: ()/() 91/79     Weight: 72 kg (158 lb 11.7 oz)  Body mass index is 31 kg/m².      Intake/Output Summary (Last 24 hours) at 9/3/2019 1042  Last data filed at 9/3/2019 0530  Gross per 24 hour   Intake 5975.2 ml   Output 4989 ml   Net 986.2 ml       Ventilator Data:     Vent Mode: A/C  Oxygen Concentration (%):  [39-98] 40  Resp Rate Total:  [25 br/min-30 br/min] 25 br/min  Vt Set:  [320 mL] 320 mL  PEEP/CPAP:  [5 cmH20] 5 cmH20  Pressure Support:  [5 cmH20] 5 cmH20  Mean Airway Pressure:  [11 cmH20-15 cmH20] 11 cmH20    Hemodynamic Parameters:       Lines/Drains:       Hemodialysis Catheter 08/31/19 1330 right femoral (Active)   Site Assessment Clean;Dry;Intact;No redness;No swelling 9/1/2019 11:15 AM   Status Accessed 9/1/2019 11:15 AM   Flows Good 9/1/2019  3:00 AM   Dressing Intervention New dressing 8/31/2019  3:15 PM   Dressing Status Biopatch in place;Clean;Dry;Intact 9/1/2019 11:15 AM   Dressing Change Due 09/07/19 9/1/2019  3:00 AM   Site Condition No complications 9/1/2019 11:15 AM   Dressing Occlusive 9/1/2019  3:00 AM   Daily Line Review Performed 9/1/2019  3:00 AM   Number of days: 0            Percutaneous Central Line Insertion/Assessment - triple lumen  08/26/19 1135 left internal jugular (Active)   Dressing biopatch in place;dressing dry and intact 9/1/2019 11:15 AM   Securement secured w/ sutures 9/1/2019 11:15 AM   Additional Site Signs no erythema;no warmth;no edema;no pain;no palpable cord;no streak formation;no drainage 9/1/2019 11:15 AM   Distal Patency/Care infusing 9/1/2019 11:15 AM   Medial Patency/Care infusing 9/1/2019 11:15 AM   Proximal Patency/Care infusing 9/1/2019 11:15 AM    Waveform normal 8/30/2019  3:15 PM   Line Interventions line leveled/zeroed 9/1/2019 11:15 AM   Dressing Change Due 09/02/19 9/1/2019 11:15 AM   Daily Line Review Performed 9/1/2019 11:15 AM   Number of days: 6            Peripheral IV - Single Lumen 08/26/19 1119 20 G Anterior;Right Forearm (Active)   Site Assessment Clean;Dry;Intact;No redness;No swelling 9/1/2019 11:15 AM   Line Status Infusing 9/1/2019 11:15 AM   Dressing Status Clean;Dry;Intact 9/1/2019 11:15 AM   Dressing Intervention Dressing reinforced 8/28/2019  3:00 PM   Dressing Change Due 08/30/19 9/1/2019  3:00 AM   Site Change Due 09/30/19 8/31/2019  7:00 PM   Reason Not Rotated Poor venous access 9/1/2019 11:15 AM   Number of days: 6            Peripheral IV - Single Lumen 08/30/19 1600 20 G Right Forearm (Active)   Site Assessment Clean;Dry;Intact;No redness;No swelling 9/1/2019 11:15 AM   Line Status Infusing 9/1/2019 11:15 AM   Dressing Status Clean;Dry;Intact 9/1/2019 11:15 AM   Dressing Change Due 09/03/19 9/1/2019 11:15 AM   Site Change Due 09/03/19 8/31/2019  7:00 PM   Reason Not Rotated Not due 9/1/2019 11:15 AM   Number of days: 1            Arterial Line 08/31/19 0120 Left Femoral (Active)   Site Assessment Clean;Dry;Intact;No redness;No swelling 9/1/2019 11:15 AM   Line Status Pulsatile blood flow 9/1/2019 11:15 AM   Art Line Waveform Appropriate 9/1/2019 11:15 AM   Arterial Line Interventions Zeroed and calibrated;Leveled 9/1/2019 11:15 AM   Color/Movement/Sensation Cool fingers/toes;Dusky fingers/toes 9/1/2019 11:15 AM   Dressing Type Transparent 9/1/2019 11:15 AM   Dressing Status Clean;Dry;Intact 9/1/2019 11:15 AM   Dressing Intervention New dressing 9/1/2019  3:00 AM   Dressing Change Due 09/03/19 9/1/2019  3:00 AM   Number of days: 1            NG/OG Tube 08/30/19 1000 Right nostril (Active)   Placement Check placement verified by x-ray 9/1/2019 11:15 AM   Tolerance no signs/symptoms of discomfort 9/1/2019 11:15 AM   Securement  secured to nostril center w/ adhesive device 9/1/2019 11:15 AM   Clamp Status/Tolerance unclamped 9/1/2019 11:15 AM   Suction Setting/Drainage Method suction at;low;intermittent setting 9/1/2019 11:15 AM   Insertion Site Appearance no redness, warmth, tenderness, skin breakdown, drainage 9/1/2019 11:15 AM   Drainage Bile;Green;Brown 9/1/2019 11:15 AM   Flush/Irrigation flushed w/;water;no resistance met 9/1/2019 11:15 AM   Intake (mL) 60 mL 8/31/2019 12:00 PM   Tube Output(mL)(Include Discarded Residual) 50 mL 9/1/2019  5:00 AM   Number of days: 2       Significant Labs:  CBC:  Recent Labs   Lab 09/03/19  0756   WBC 13.34*   RBC 3.22*   HGB 9.1*   HCT 28.6*   PLT 14*   MCV 89   MCH 28.3   MCHC 31.8*     BMP:  Recent Labs   Lab 09/03/19  0300   *   K 5.7*   CL 93*   CO2 20*   BUN 22*   CREATININE 1.6*   CALCIUM 6.6*      Tacrolimus Levels:  Recent Labs   Lab 09/01/19  0436   TACROLIMUS 1.9*     Microbiology:  Microbiology Results (last 7 days)     Procedure Component Value Units Date/Time    Blood culture [[id number]] Collected:  09/03/19 0928    Order Status:  Sent Specimen:  Blood from Line, Femoral, Left Updated:  09/03/19 0929    Blood culture [[id number]] Collected:  09/03/19 0928    Order Status:  Sent Specimen:  Blood from Line, Femoral, Left Updated:  09/03/19 0929    Blood culture [[id number]] Collected:  09/02/19 1128    Order Status:  Completed Specimen:  Blood from Line, Femoral, Left Updated:  09/03/19 0850     Blood Culture, Routine Gram stain aer bottle: Gram positive cocci in clusters resembling Staph       Results called to and read back by:Dolores Richardson RN  09/03/2019  08:49    Blood culture [[id number]] Collected:  09/02/19 1018    Order Status:  Completed Specimen:  Blood from Peripheral, Forearm, Right Updated:  09/03/19 0611     Blood Culture, Routine Gram stain aer bottle: Gram positive cocci in clusters resembling Staph       Results called to and read back by: Cassidy Abreu RN 09/03/2019   06:11    Blood culture [[id number]] Collected:  08/30/19 1445    Order Status:  Completed Specimen:  Blood from Peripheral, Forearm, Right Updated:  09/02/19 1612     Blood Culture, Routine No Growth to date      No Growth to date      No Growth to date      No Growth to date    Blood culture [[id number]] Collected:  08/28/19 1250    Order Status:  Completed Specimen:  Blood from Peripheral, Hand, Right Updated:  09/02/19 1612     Blood Culture, Routine No growth after 5 days.    Blood culture [[id number]]  (Abnormal) Collected:  08/30/19 1450    Order Status:  Completed Specimen:  Blood from Peripheral, Hand, Left Updated:  09/02/19 0847     Blood Culture, Routine Gram stain lisa bottle: Gram positive cocci       Results called to and read back by:Torri Murdock RN 08/31/2019  16:23      METHICILLIN RESISTANT STAPHYLOCOCCUS AUREUS  ID consult required at [facility] and [facility].  For susceptibility see order #[id number]      Fungus culture [[id number]]  (Abnormal) Collected:  08/26/19 1441    Order Status:  Completed Specimen:  Respiratory from Bronchial Wash Updated:  09/01/19 1236     Fungus (Mycology) Culture BRANDI GLABRATA  Many      Narrative:       Bronchial Wash    Blood culture [[id number]]  (Abnormal)  (Susceptibility) Collected:  08/28/19 1020    Order Status:  Completed Specimen:  Blood from Peripheral, Wrist, Right Updated:  08/31/19 1043     Blood Culture, Routine Gram stain aer bottle: Gram positive cocci in clusters resembling Staph      Results called to and read back by:Lashanda Rios RN 08/29/2019  17:38      METHICILLIN RESISTANT STAPHYLOCOCCUS AUREUS  ID consult required at [facility].      Blood culture #1 **CANNOT BE ORDERED STAT** [[id number]]  (Abnormal) Collected:  08/26/19 0848    Order Status:  Completed Specimen:  Blood from Line, Port A Cath Updated:  08/29/19 1111     Blood Culture, Routine Gram stain lisa bottle:  Gram positive cocci in clusters resembling Staph       Results called to and read back by:Monet Martinez RN 08/27/2019  06:04      Gram stain aer bottle: Gram positive cocci in clusters resembling Staph       08/28/2019  10:29      METHICILLIN RESISTANT STAPHYLOCOCCUS AUREUS  ID consult required at [facility].  For susceptibility see order #[id number]      Blood culture #2 **CANNOT BE ORDERED STAT** [[id number]]  (Abnormal)  (Susceptibility) Collected:  08/26/19 0859    Order Status:  Completed Specimen:  Blood from Wrist, Right Updated:  08/29/19 1110     Blood Culture, Routine Gram stain aer bottle: Gram positive cocci in clusters resembling Staph       Results called to and read back by:Monet Martinez RN 08/27/2019 03:23      Gram stain lisa bottle: Gram positive cocci in clusters resembling Staph       08/27/2019  11:38      METHICILLIN RESISTANT STAPHYLOCOCCUS AUREUS  ID consult required at [facility].      Culture, Respiratory [[id number]]  (Abnormal)  (Susceptibility) Collected:  08/26/19 1441    Order Status:  Completed Specimen:  Respiratory from Bronchial Wash Updated:  08/28/19 1213     Respiratory Culture METHICILLIN RESISTANT STAPHYLOCOCCUS AUREUS  Many       Gram Stain (Respiratory) <10 epithelial cells per low power field.     Gram Stain (Respiratory) Few WBC's     Gram Stain (Respiratory) Many Gram positive cocci    Narrative:       Bronchial Wash    AFB Culture & Smear [[id number]] Collected:  08/26/19 1441    Order Status:  Completed Specimen:  Respiratory from Bronchial Wash Updated:  08/27/19 2127     AFB Culture & Smear Culture in progress     AFB CULTURE STAIN No acid fast bacilli seen.    Narrative:       Bronchial Wash          I have reviewed all pertinent labs within the past 24 hours.        Objective:   Physical Exam   Constitutional: She appears well-developed and well-nourished. She appears toxic. She is  sedated and intubated.   HENT:   Head: Normocephalic and atraumatic.   ET and OG tubes in place   Eyes: Right eye exhibits no discharge. Left eye exhibits no discharge. No scleral icterus.   Neck: Neck supple.   Cardiovascular: Regular rhythm. Tachycardia present.   Pulmonary/Chest: She is intubated. No respiratory distress. She has no wheezes. She has rales.   Mechanical breath sounds   Abdominal: Soft. Bowel sounds are normal.   Musculoskeletal: She exhibits edema (generalized). She exhibits no tenderness or deformity.   Skin: There is pallor.   Bilateral lower extremity mottling (right leg extends to mid-thigh, well demarcated), bilateral hands and digits mottled, tips of left digits black, cool to touch   Nursing note and vitals reviewed.        Vital Signs (Most Recent):  Temp: 99.3 °F (37.4 °C) (09/03/19 0908)  Pulse: 110 (09/03/19 0908)  Resp: (!) 26 (09/02/19 2303)  BP: (!) 123/51 (09/03/19 0900)  SpO2: 97 % (09/03/19 0908) Vital Signs (24h Range):  Temp:  [98.1 °F (36.7 °C)-99.3 °F (37.4 °C)] 99.3 °F (37.4 °C)  Pulse:  [108-128] 110  Resp:  [25-26] 26  SpO2:  [91 %-99 %] 97 %  BP: ()/(51-75) 123/51  Arterial Line BP: ()/() 91/79     Weight: 72 kg (158 lb 11.7 oz)  Body mass index is 31 kg/m².      Intake/Output Summary (Last 24 hours) at 9/3/2019 1042  Last data filed at 9/3/2019 0530  Gross per 24 hour   Intake 5975.2 ml   Output 4989 ml   Net 986.2 ml       Ventilator Data:     Vent Mode: A/C  Oxygen Concentration (%):  [39-98] 40  Resp Rate Total:  [25 br/min-30 br/min] 25 br/min  Vt Set:  [320 mL] 320 mL  PEEP/CPAP:  [5 cmH20] 5 cmH20  Pressure Support:  [5 cmH20] 5 cmH20  Mean Airway Pressure:  [11 cmH20-15 cmH20] 11 cmH20    Hemodynamic Parameters:       Lines/Drains:       Hemodialysis Catheter 08/31/19 1330 right femoral (Active)   Site Assessment Clean;Dry;Intact;No redness;No swelling 9/1/2019 11:15 AM   Status Accessed 9/1/2019 11:15 AM   Flows Good 9/1/2019  3:00 AM   Dressing  Intervention New dressing 8/31/2019  3:15 PM   Dressing Status Biopatch in place;Clean;Dry;Intact 9/1/2019 11:15 AM   Dressing Change Due 09/07/19 9/1/2019  3:00 AM   Site Condition No complications 9/1/2019 11:15 AM   Dressing Occlusive 9/1/2019  3:00 AM   Daily Line Review Performed 9/1/2019  3:00 AM   Number of days: 0            Percutaneous Central Line Insertion/Assessment - triple lumen  08/26/19 1135 left internal jugular (Active)   Dressing biopatch in place;dressing dry and intact 9/1/2019 11:15 AM   Securement secured w/ sutures 9/1/2019 11:15 AM   Additional Site Signs no erythema;no warmth;no edema;no pain;no palpable cord;no streak formation;no drainage 9/1/2019 11:15 AM   Distal Patency/Care infusing 9/1/2019 11:15 AM   Medial Patency/Care infusing 9/1/2019 11:15 AM   Proximal Patency/Care infusing 9/1/2019 11:15 AM   Waveform normal 8/30/2019  3:15 PM   Line Interventions line leveled/zeroed 9/1/2019 11:15 AM   Dressing Change Due 09/02/19 9/1/2019 11:15 AM   Daily Line Review Performed 9/1/2019 11:15 AM   Number of days: 6            Peripheral IV - Single Lumen 08/26/19 1119 20 G Anterior;Right Forearm (Active)   Site Assessment Clean;Dry;Intact;No redness;No swelling 9/1/2019 11:15 AM   Line Status Infusing 9/1/2019 11:15 AM   Dressing Status Clean;Dry;Intact 9/1/2019 11:15 AM   Dressing Intervention Dressing reinforced 8/28/2019  3:00 PM   Dressing Change Due 08/30/19 9/1/2019  3:00 AM   Site Change Due 09/30/19 8/31/2019  7:00 PM   Reason Not Rotated Poor venous access 9/1/2019 11:15 AM   Number of days: 6            Peripheral IV - Single Lumen 08/30/19 1600 20 G Right Forearm (Active)   Site Assessment Clean;Dry;Intact;No redness;No swelling 9/1/2019 11:15 AM   Line Status Infusing 9/1/2019 11:15 AM   Dressing Status Clean;Dry;Intact 9/1/2019 11:15 AM   Dressing Change Due 09/03/19 9/1/2019 11:15 AM   Site Change Due 09/03/19 8/31/2019  7:00 PM   Reason Not Rotated Not due 9/1/2019 11:15 AM    Number of days: 1            Arterial Line 08/31/19 0120 Left Femoral (Active)   Site Assessment Clean;Dry;Intact;No redness;No swelling 9/1/2019 11:15 AM   Line Status Pulsatile blood flow 9/1/2019 11:15 AM   Art Line Waveform Appropriate 9/1/2019 11:15 AM   Arterial Line Interventions Zeroed and calibrated;Leveled 9/1/2019 11:15 AM   Color/Movement/Sensation Cool fingers/toes;Dusky fingers/toes 9/1/2019 11:15 AM   Dressing Type Transparent 9/1/2019 11:15 AM   Dressing Status Clean;Dry;Intact 9/1/2019 11:15 AM   Dressing Intervention New dressing 9/1/2019  3:00 AM   Dressing Change Due 09/03/19 9/1/2019  3:00 AM   Number of days: 1            NG/OG Tube 08/30/19 1000 Right nostril (Active)   Placement Check placement verified by x-ray 9/1/2019 11:15 AM   Tolerance no signs/symptoms of discomfort 9/1/2019 11:15 AM   Securement secured to nostril center w/ adhesive device 9/1/2019 11:15 AM   Clamp Status/Tolerance unclamped 9/1/2019 11:15 AM   Suction Setting/Drainage Method suction at;low;intermittent setting 9/1/2019 11:15 AM   Insertion Site Appearance no redness, warmth, tenderness, skin breakdown, drainage 9/1/2019 11:15 AM   Drainage Bile;Green;Brown 9/1/2019 11:15 AM   Flush/Irrigation flushed w/;water;no resistance met 9/1/2019 11:15 AM   Intake (mL) 60 mL 8/31/2019 12:00 PM   Tube Output(mL)(Include Discarded Residual) 50 mL 9/1/2019  5:00 AM   Number of days: 2       Significant Labs:  CBC:  Recent Labs   Lab 09/03/19  0756   WBC 13.34*   RBC 3.22*   HGB 9.1*   HCT 28.6*   PLT 14*   MCV 89   MCH 28.3   MCHC 31.8*     BMP:  Recent Labs   Lab 09/03/19  0300   *   K 5.7*   CL 93*   CO2 20*   BUN 22*   CREATININE 1.6*   CALCIUM 6.6*      Tacrolimus Levels:  Recent Labs   Lab 09/01/19  0436   TACROLIMUS 1.9*     Microbiology:  Microbiology Results (last 7 days)     Procedure Component Value Units Date/Time    Blood culture [[id number]] Collected:  09/03/19 0928    Order Status:  Sent Specimen:  Blood from  Line, Femoral, Left Updated:  09/03/19 0929    Blood culture [[id number]] Collected:  09/03/19 0928    Order Status:  Sent Specimen:  Blood from Line, Femoral, Left Updated:  09/03/19 0929    Blood culture [[id number]] Collected:  09/02/19 1128    Order Status:  Completed Specimen:  Blood from Line, Femoral, Left Updated:  09/03/19 0850     Blood Culture, Routine Gram stain aer bottle: Gram positive cocci in clusters resembling Staph       Results called to and read back by:Dolores Richardson RN  09/03/2019  08:49    Blood culture [[id number]] Collected:  09/02/19 1018    Order Status:  Completed Specimen:  Blood from Peripheral, Forearm, Right Updated:  09/03/19 0611     Blood Culture, Routine Gram stain aer bottle: Gram positive cocci in clusters resembling Staph       Results called to and read back by: Cassidy Abreu RN 09/03/2019  06:11    Blood culture [[id number]] Collected:  08/30/19 1445    Order Status:  Completed Specimen:  Blood from Peripheral, Forearm, Right Updated:  09/02/19 1612     Blood Culture, Routine No Growth to date      No Growth to date      No Growth to date      No Growth to date    Blood culture [[id number]] Collected:  08/28/19 1250    Order Status:  Completed Specimen:  Blood from Peripheral, Hand, Right Updated:  09/02/19 1612     Blood Culture, Routine No growth after 5 days.    Blood culture [[id number]]  (Abnormal) Collected:  08/30/19 1450    Order Status:  Completed Specimen:  Blood from Peripheral, Hand, Left Updated:  09/02/19 0847     Blood Culture, Routine Gram stain lisa bottle: Gram positive cocci       Results called to and read back by:Torri Murdock RN 08/31/2019  16:23      METHICILLIN RESISTANT STAPHYLOCOCCUS AUREUS  ID consult required at [facility].  For susceptibility see order #[id number]      Fungus culture [[id number]]  (Abnormal) Collected:  08/26/19 1441    Order Status:  Completed Specimen:  Respiratory from Bronchial Wash  Updated:  09/01/19 1236     Fungus (Mycology) Culture BRANDI GLABRATA  Many      Narrative:       Bronchial Wash    Blood culture [[id number]]  (Abnormal)  (Susceptibility) Collected:  08/28/19 1020    Order Status:  Completed Specimen:  Blood from Peripheral, Wrist, Right Updated:  08/31/19 1043     Blood Culture, Routine Gram stain aer bottle: Gram positive cocci in clusters resembling Staph      Results called to and read back by:Lashanda Rios RN 08/29/2019  17:38      METHICILLIN RESISTANT STAPHYLOCOCCUS AUREUS  ID consult required at [facility].      Blood culture #1 **CANNOT BE ORDERED STAT** [[id number]]  (Abnormal) Collected:  08/26/19 0848    Order Status:  Completed Specimen:  Blood from Line, Port A Cath Updated:  08/29/19 1111     Blood Culture, Routine Gram stain lisa bottle: Gram positive cocci in clusters resembling Staph       Results called to and read back by:Monet Martinez RN 08/27/2019  06:04      Gram stain aer bottle: Gram positive cocci in clusters resembling Staph       08/28/2019  10:29      METHICILLIN RESISTANT STAPHYLOCOCCUS AUREUS  ID consult required at [facility] and [facility].  For susceptibility see order #[id number]      Blood culture #2 **CANNOT BE ORDERED STAT** [[id number]]  (Abnormal)  (Susceptibility) Collected:  08/26/19 0859    Order Status:  Completed Specimen:  Blood from Wrist, Right Updated:  08/29/19 1110     Blood Culture, Routine Gram stain aer bottle: Gram positive cocci in clusters resembling Staph       Results called to and read back by:Monet Martinez RN 08/27/2019 03:23      Gram stain lisa bottle: Gram positive cocci in clusters resembling Staph       08/27/2019  11:38      METHICILLIN RESISTANT STAPHYLOCOCCUS AUREUS  ID consult required at [facility].      Culture, Respiratory [[id number]]  (Abnormal)  (Susceptibility) Collected:  08/26/19 1441     Order Status:  Completed Specimen:  Respiratory from Bronchial Wash Updated:  08/28/19 1213     Respiratory Culture METHICILLIN RESISTANT STAPHYLOCOCCUS AUREUS  Many       Gram Stain (Respiratory) <10 epithelial cells per low power field.     Gram Stain (Respiratory) Few WBC's     Gram Stain (Respiratory) Many Gram positive cocci    Narrative:       Bronchial Wash    AFB Culture & Smear [[id number]] Collected:  08/26/19 1441    Order Status:  Completed Specimen:  Respiratory from Bronchial Wash Updated:  08/27/19 2127     AFB Culture & Smear Culture in progress     AFB CULTURE STAIN No acid fast bacilli seen.    Narrative:       Bronchial Wash          I have reviewed all pertinent labs within the past 24 hours.

## 2019-09-04 NOTE — SIGNIFICANT EVENT
I addressed the family toady about Gisel's clinical condition. She has become more unstable overnight and her oxygenation worsened and did not improve despite increase in fluid removal and alveolar recruitment efforts. Her neurologic exam also worsened (developing anisocoria) My suspicion for her decompensation was that she had a pulmonary embolism (from her RA clot). I discussed with the family that with her critical state and thrombocytopenia treating her with anticoagulation would not lead to recovery.    We decided to make her a DNR and withdraw life support. Life support was discontinued at 14:05 and she went into asystole at 14:12. I examined the patient and her pupils were dilated and fixed, she had no corneal reflexes, no gag, no carotid pulse, no chest rise, and no heart sounds.    I declared her  with 14:12 as the time of death. Cause of death was severe sepsis from MRSA. Source of sepsis was an SVC/RA clot. Pulmonary embolism highly suspected but unable to confirm. Autopsy will not be requested.

## 2019-09-04 NOTE — MEDICAL/APP STUDENT
[facility]  Nephrology  Consult Note    Patient Name: Juanita Ibarra  MRN: [id number]  Admission Date: 8/26/2019  Hospital Length of Stay: 9 days  Attending Provider: Jake Alvarez MD   Primary Care Physician: Primary Doctor No  Principal Problem:Septic shock    Consults  Subjective:     HPI: A 28 y/o female s/p bilateral re-transplant (6/18/19) secondary to CF, who presented to the ED in acute respiratory distress. Patient's family member reports that she had and episode of fever 2 days ago accompanied by productive cough requiring increasing at home oxygen to ~5L. Also, caregiver reports poor appetite and PO intake over the last week. Upon arrival to ED, oxygen saturations were in mid 70s while on NRB and patient was placed on BiPAP which was transitioned to 25L comfort flow at 100% FiO2. On arrival to ED and worsening clinical status patient was transferred to the ICU, intubated, and started on Zerbaxa/Vancomycin. 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       Past Surgical History:   Procedure Laterality Date    ABDOMINAL SURGERY      peg tube     EAPUQBRK-IRVOOQDEUAG-QGMXPDMBEREH N/A 5/19/2015    Performed by [facility]y Dias [facility] at [facility] OR    BIOPSY-BRONCHUS N/A 12/20/2016    Performed by Jake Alvarez MD at [facility] OR 2ND FLR    Bronchoscopy N/A 7/5/2019    Performed by  Francisca Morin DO at [facility] OR 2ND FLR    BRONCHOSCOPY Bilateral 12/11/2018    Performed by Francisca Morin DO at [facility] OR 2ND FLR    BRONCHOSCOPY N/A 10/1/2018    Performed by Jake Alvarez MD at [facility] OR 2ND FLR    BRONCHOSCOPY Bilateral 12/20/2016    Performed by Jake Alvarez MD at [facility] OR 2ND FLR    BRONCHOSCOPY - flexible bronchoscopy with probable tissue biopsy CPT 66134 N/A 7/21/2015    Performed by Jake Alvarez MD at [facility] OR 2ND FLR    BRONCHOSCOPY - flexible bronchoscopy with probable tissue biospy CPT 79611 N/A 10/20/2015    Performed by Jake Alvarez MD at [facility] OR 2ND FLR    BRONCHOSCOPY - flexible bronchoscopy with tissue biopsy CPT 27677 N/A 9/29/2015    Performed by Jake Alvarez MD at [facility] OR 2ND FLR    BRONCHOSCOPY - flexible bronchoscopy with tissue biopsy CPT 87641 N/A 5/26/2015    Performed by Jake Alvarez MD at [facility] OR 1ST FLR    BRONCHOSCOPY flexible bronchoscopy with tissue biopsy N/A 9/8/2014    Performed by Jake Alvarez MD at [facility] OR 2ND FLR    BRONCHOSCOPY flexible with possible tissue biopsy N/A 8/8/2014    Performed by Jake Alvarez MD at [facility] OR 2ND FLR    BRONCHOSCOPY, BAL, BIOPSIES N/A 3/20/2018    Performed by Jake Alvarez MD at [facility] OR 2ND FLR    BRONCHOSCOPY-FIBEROPTIC N/A 1/29/2016    Performed by Joann Cifuentes DO at [facility] OR 2ND FLR    BRONCHOSCOPY; Bronchoscopy with BAL and transbronchial biopsies under general anesthesia N/A 5/29/2018    Performed by Jake Alvarez MD at [facility] OR 2ND FLR    CHOLECYSTECTOMY  2016    CHOLECYSTECTOMY-LAPAROSCOPIC N/A 7/22/2016    Performed by Joshua Goldberg, MD at [facility] OR 2ND FLR    COLONOSCOPY N/A 5/3/2019    Performed by Juancho Rich MD at [facility] ENDO (2ND FLR)    ENDOMETRIAL ABLATION  2015    KJB    ETHMOIDECTOMY Bilateral 4/9/2019    Performed by Adin Burks III, MD at [facility] OR 2ND FLR    EXPLORATION, WOUND - right chest N/A 8/28/2019    [facility]    FESS      FESS Bilateral 10/21/2013    Performed by Jared Whatley MD at [facility] OR [address] [place] [address]    FESS, USING COMPUTER-ASSISTED NAVIGATION N/A 4/9/2019    Performed by Adin Burks III, MD at [facility] OR [address] [place] [address]    FINE NEEDLE ASPIRATION (FNA)  of a cervical lymphadenopathy  1/29/2016    [facility]    flex bronchoscopy with probable tissue biopsy N/A 7/31/2014    Performed by [facility] Provider at [facility] OR [address] [place] [address]    flexible bronchoscopy with tissue biopsy N/A 12/11/2014    [facility]    flexible bronchoscopy with tissue biopsy  CPT 00032 N/A 8/15/2019    [facility]    flexible bronchoscopy with tissue biopsy CPT 23066  N/A 7/26/2019    Performed by Jake Alvarez MD at [facility] OR [facility] FLR    HYSTERECTOMY  04/2017    [facility]    INJECTION-VOCAL CORD Left 6/24/2014    [facility]    [id number]-S-CATH to right chest and removal of portacath to left chests wall. Bilateral 6/24/2014    Performed by Zhen Dorado MD at [facility] OR [address] [place] [address]    LARYNX SURGERY  2016    LUNG TRANSPLANT Bilateral 6/12/14    MAXILLARY ANTROSTOMY  4/9/2019    Performed by Adin Burks III, MD at [facility] OR [address] [place] [address]    MYRINGOTOMY W/ TUBES Right 04/2017    MYRINGOTOMY WITH INSERTION OF PE TUBES Right 4/15/2017    Performed by Gary Null MD at [facility] OR [facility] FLR    PLACEMENT-TUBE-PEG  5/15/2014    [facility]    PORTACATH PLACEMENT Right     rt sc    REDO BILATERAL LUNG TRANSPLANT; CLAMSHELL Bilateral 6/18/2019    [facility]    REMOVAL-TUBE-PEG  5/15/2014    [facility]    RHC for lung re-transplant N/A 1/22/2019    Performed by Laura Rachel MD at [facility] CATH LAB    ROBOTIC  ASSISTED LAPAROSCOPIC HYSTERECTOMY N/A 4/25/2017    Performed by Deny Dias Jr., MD at [facility] OR    SALPINGECTOMY Bilateral 2015    KJB    SALPINGECTOMY-LAPAROSCOPIC Bilateral 5/19/2015    Performed by Deny Dias Jr., MD at [facility] OR    SINUS SURGERY FUNCTIONAL ENDOSCOPIC WITH NAVIGATION Bilateral 4/3/2017    [facility]    SINUS SURGERY FUNCTIONAL ENDOSCOPIC WITH NAVIGATION, 20814, 50741, 33457, 67541 Bilateral 2/5/2015    [facility]    SPHENOIDECTOMY Bilateral 4/9/2019    Performed by Adin Burks III, MD at [facility] OR [address] [place] [address]    THYROPLASTY - Medialization laryngoplasty, cricothyroid subluxation, arytenoid repositioning Left 8/2/2016    [facility] Gary [facility]    TRANSPLANT-LUNG Bilateral 6/11/2014    Performed by Adolfo Huston MD at [facility] OR [address] [place] [address]       Review of patient's allergies indicates:   Allergen Reactions    Albuterol Palpitations    Colistin Anaphylaxis    Vancomycin analogues      Infusion reaction that does not resolve with slowing  Pt. States she can tolerate it when given with 50 mg Benadryl and ran over 3 hours    Neupogen [filgrastim] Other (See Comments)     Ostealgia after five daily doses of 300 mcg.      Bactrim [sulfamethoxazole-trimethoprim] Hives    Ceftazidime Hives     Pt stated can tolerate cefapine not ceftazidime    Ceftazidime     Dronabinol Other (See Comments)     Mental changes/hallucinations    Haldol [haloperidol lactate] Other (See Comments)     Seizure like activity    Nsaids (non-steroidal anti-inflammatory drug)      Cannot have due to lung transplant    Adhesive Rash     Cloth tape- please use tegaderm or paper tape    Aztreonam Rash    Ciprofloxacin Nausea And Vomiting     Projectile N/V, per patient.  Unwilling to retry therapy.     Current Facility-Administered Medications   Medication Frequency    0.9%  NaCl infusion (CRRT USE  ONLY) Continuous    alteplase injection 2 mg Once    alteplase injection 2 mg Once    carboxymethylcellulose sodium 1 % DpGe TID    chlorhexidine 0.12 % solution 15 mL BID    dextrose 50% injection 12.5 g PRN    diphenhydrAMINE 12.5 mg/5 mL elixir 25 mg Daily PRN    diphenhydrAMINE injection 50 mg Q6H PRN    fentaNYL 2500 mcg in 0.9% sodium chloride 250 mL infusion premix (titrating) Continuous    glucagon (human recombinant) injection 1 mg PRN    heparin 25,000 units in dextrose 5% 250 mL (100 units/mL) infusion (heparin infusion) Continuous    hydrocortisone sodium succinate injection 50 mg Q6H    HYDROmorphone injection 1 mg Q6H PRN    insulin aspart U-100 pen 0-5 Units Q6H PRN    levalbuterol nebulizer solution 1.25 mg Q8H    LORazepam tablet 1 mg Q8H PRN    magnesium sulfate 2g in water 50mL IVPB (premix) PRN    metoprolol injection 10 mg Q5 Min PRN    micafungin 100 mg in sodium chloride 0.9 % 100 mL IVPB (ready to mix system) Q24H    midazolam (VERSED) 1 mg/mL in dextrose 5 % 100 mL infusion (non-titrating) Continuous    norepinephrine 16 mg in dextrose 5 % 250 mL infusion Continuous    ondansetron injection 8 mg Q6H PRN    oxyCODONE immediate release tablet Tab 10 mg Q8H PRN    pantoprazole injection 40 mg Daily    promethazine (PHENERGAN) 12.5 mg in dextrose 5 % 50 mL IVPB Q6H PRN    sodium chloride 0.9% bolus 1,000 mL Once    sodium chloride 0.9% bolus 500 mL Once    sodium phosphate 20.01 mmol in dextrose 5 % 250 mL IVPB PRN    sodium phosphate 30 mmol in dextrose 5 % 250 mL IVPB PRN    sodium phosphate 39.99 mmol in dextrose 5 % 250 mL IVPB PRN    vasopressin (PITRESSIN) 0.2 Units/mL in dextrose 5 % 100 mL infusion Continuous    voriconazole (VFEND) 200 mg in dextrose 5 % 100 mL IVPB Q12H     Family History     Problem Relation (Age of Onset)    Cancer Maternal Grandmother    Diabetes Maternal Grandfather    Drug abuse Maternal Grandfather    Hypertension Maternal  Grandmother    Thrombocytopenia Maternal Grandfather        Tobacco Use    Smoking status: Never Smoker    Smokeless tobacco: Never Used   Substance and Sexual Activity    Alcohol use: No     Comment: Has not had an alcoholic drink in more than 2 months.    Drug use: No    Sexual activity: Yes     Partners: Male     Birth control/protection: See Surgical Hx     Comment: current partner since Oct 2016     Unable to conduct a ROS- patient remains intubated     Objective:     Vital Signs (Most Recent):  Temp: 98.8 °F (37.1 °C) (09/04/19 1200)  Pulse: 109 (09/04/19 1200)  Resp: (!) 25 (09/04/19 1100)  BP: 103/64 (09/04/19 1000)  SpO2: (!) 84 % (09/04/19 1200)  O2 Device (Oxygen Therapy): ventilator (09/04/19 1200) Vital Signs (24h Range):  Temp:  [95.9 °F (35.5 °C)-99.3 °F (37.4 °C)] 98.8 °F (37.1 °C)  Pulse:  [] 109  Resp:  [25-30] 25  SpO2:  [83 %-100 %] 84 %  BP: ()/(50-73) 103/64  Arterial Line BP: ()/() 117/46     Weight: 70 kg (154 lb 5.2 oz) (09/04/19 0539)  Body mass index is 30.14 kg/m².  Body surface area is 1.72 meters squared.    I/O last 3 completed shifts:  In: 7145 [I.V.:6873; Blood:252; NG/GT:20]  Out: 6683 [Drains:120; Other:6563]    Physical Exam   Constitutional: She appears well-developed and well-nourished.   HENT:   Head: Normocephalic and atraumatic.   Eyes: Conjunctivae are normal.   Neck: No JVD present.   Cardiovascular: Normal rate, regular rhythm, normal heart sounds and intact distal pulses. Exam reveals no friction rub.   No murmur heard.  Pulmonary/Chest: Breath sounds normal.   Abdominal: Soft. Bowel sounds are normal.   Musculoskeletal: She exhibits edema.   Skin: Skin is warm. No erythema.   Mottled appearance of right lower limb     Significant Labs:    Lab Results   Component Value Date    WBC 4.92 09/04/2019    HGB 7.7 (L) 09/04/2019    HCT 22.5 (L) 09/04/2019    MCV 84 09/04/2019    PLT 22 (LL) 09/04/2019     BMP  Lab Results   Component Value Date    NA  136 09/04/2019     09/04/2019    K 3.5 09/04/2019    K 3.5 09/04/2019    CL 93 (L) 09/04/2019    CL 93 (L) 09/04/2019    CO2 28 09/04/2019    CO2 28 09/04/2019    BUN 6 09/04/2019    BUN 6 09/04/2019    CREATININE 0.5 09/04/2019    CREATININE 0.5 09/04/2019    CALCIUM 7.9 (L) 09/04/2019    CALCIUM 7.9 (L) 09/04/2019    ANIONGAP 15 09/04/2019    ANIONGAP 15 09/04/2019    ESTGFRAFRICA >60.0 09/04/2019    ESTGFRAFRICA >60.0 09/04/2019    EGFRNONAA >60.0 09/04/2019    EGFRNONAA >60.0 09/04/2019     Significant Imaging:  X-Ray Chest 1 View  Narrative: EXAMINATION:  XR CHEST 1 VIEW    CLINICAL HISTORY:  intubated;    TECHNIQUE:  Single frontal view of the chest was performed.    COMPARISON:  September 3, 2019 452 hours    FINDINGS:  ET tube is noted the distal tip just above the level of the ness, left-sided central venous catheter noted, stable appearance, enteric tube noted distal tip subdiaphragmatic below the field of view.  Additional leads/line/objects overlie the patient.  Postoperative cardiothoracic change noted.  The cardiomediastinal silhouette appears stable.  Abnormal pattern of pulmonary opacities bilaterally again noted however with improvement, improved aeration.  Continued appearance of pleural fluid on the right, stable.  There is no pneumothorax seen.  The osseous structures appear intact.  Impression: Mild interval improvement, as above.    Electronically signed by: Riley Sandoval  Date:    09/04/2019  Time:    06:20        Assessment/Plan:     Active Diagnoses:    Diagnosis Date Noted POA    PRINCIPAL PROBLEM:  Septic shock [A41.9, R65.21] 08/26/2019 Yes    Limb ischemia [I99.8]  No    Ischemic leg [I99.8] 09/01/2019 No    Arterial occlusion, lower extremity [I70.209] 09/01/2019 No    Respiratory acidosis [E87.2]  No    Sinus tachycardia [R00.0]  No    Metabolic acidosis [E87.2]  Yes    Bacteremia [R78.81] 08/28/2019 No    Alteration in skin integrity related to surgical incision  [R23.9] 08/27/2019 Yes    Preventive measure [Z29.9] 08/27/2019 Not Applicable    Acute respiratory failure [J96.00] 08/26/2019 Yes    Alteration in skin integrity [R23.9] 08/26/2019 Yes    Thrombocytopenia, unspecified [D69.6] 12/04/2018 No    S/P lung transplant [Z94.2] 10/24/2015 Not Applicable    LISBETH (acute kidney injury) [N17.9] 08/14/2015 Yes    Prophylactic antibiotic [Z79.2] 06/30/2014 Not Applicable    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 The patient was found to be febrile (101 F), tachycardic,and hypotensive suggestive of end organ damage to the kidneys in the context of septic shock. Subsequent blood cultures were positive for MRSA, respiratory cultures were positive for candida albicans, candida glabrata, and pseudomonas aeruginosa confirming an infectious component for this presentation. She has been tolerating SLED well since yesterday after access was re-established in the R femoral. She remains anuric with a metabolic alkalosis (ABG=7.461/41/52/29.3).     Plan:     Acute Kidney Injury  -Creatinine decreased to .5 today, down from 1.5 yesterday  -SLED re-started with new access in R femoral at a flow rate of 150 mL/min and UF of 350 mL/hr  -Hyperkalemic yesterday (K+=5.7)- resolved (K+=3.5 today)  -Patient remains anuric- continue to monitor  -Avoid nephrotoxins   -Strict Is/Os  -Renally dose meds    Metabolic Alkalosis   -Continue to  monitor with ABGs     Thank you for your consult. I will follow-up with the patient directly. Please contact if any questions or concerns.     Caitie Chapin  Nephrology  [facility]

## 2019-09-04 NOTE — NURSING
Reyes Will NP called. Notified that O2 sats continue to be 81-83% despite increase in PEEP this am, suctioning, changing out probe, and repositioning. No new orders at this time.

## 2019-09-04 NOTE — PROGRESS NOTES
Patient seen for wound care follow up and wound vac dressing change.     Patient with multiple new skin issues which appear to be related to the severity of her condition including the extensive use of pressors as her situation worsened recently. She now has mottled extremities and black to her fingertips. She has blisters to the perineum, skin tears to her legs and purple areas to the abdomen and upper legs. She has some crusted areas of scattered skin loss to the forehead and cheeks likely from being prone but these appear superficial with normal skin tone surrounding, no drainage or redness noted.     Nurse reports her sacrum is clear.     Wound vac changed per orders with no difficulties. Hydrocolloid barrier applied to periwound to protect as her skin appears fragile at this time.     Recommend to protect other areas with no sting to blisters and foams to skin tears.     Will continue to follow as needed. Nursing to continue care.    Juanis Kerns BS, BSN, RN, COCN, [facility]  [id number]       09/03/19 1050        Incision/Site 06/18/19 1300 Right Chest transverse   Date First Assessed/Time First Assessed: 06/18/19 1300   Side: Right  Location: Chest  Orientation: transverse   Wound Image    Incision WDL ex   Dressing Appearance Intact   Drainage Amount Scant   Drainage Characteristics/Odor Serosanguineous   Appearance Red;Pink;Yellow;Moist;Adipose   Red (%), Wound Tissue Color 90 %   Yellow (%), Wound Tissue Color 10 %   Periwound Area Intact   Wound Edges Open   Wound Length (cm) 1.7 cm   Wound Width (cm) 13.4 cm   Wound Depth (cm) 1.7 cm   Wound Volume (cm^3) 38.73 cm^3   Wound Surface Area (cm^2) 22.78 cm^2   Care Cleansed with:;Sterile normal saline   Dressing Changed   Dressing Change Due 09/06/19        Negative Pressure Wound Therapy    Placement Date/Time: 08/26/19 1231   Side: Right  Location: Upper quadrant;Chest   NPWT Type Vacuum Therapy   Therapy Setting NPWT Continuous therapy   Pressure Setting NPWT 125 mmHg    Therapy Interventions NPWT Dressing changed   Sponges Inserted NPWT Black;1   Sponges Removed NPWT Black;1

## 2019-09-04 NOTE — PLAN OF CARE
Problem: Adult Inpatient Plan of Care  Goal: Patient-Specific Goal (Individualization)  Hx: Cystic fibrosis; lung re-transplant (6/2019); seizures    8/26: Admitted to SICU for resp distress - intubated; levo, vaso, insulin; bronchoscopy; blood/resp cultures  8/27: CRRT  8/28: Levo off   8/29: Levo on; warming blankets & fluids d/t hypothermia  8/30: Extubated; re-intubated, paralyzed, proned   8/31: Supined; febrile, cooling blanket applied; Tushar on  9/1: Tushar off; Nimbex off; CRRT restarted; CT of chest, abdomen, and pelvis, Angiotensin II off, BLE US - clots  9/3: CRRT started, CT of head, echo of heart, dialysis cath exchange       Nursing:  MAP >60  Accucheck Q4H   ABG daily  PM [place]             Outcome: Ongoing (interventions implemented as appropriate)  Pt. Remains intubated AC 60% PEEP 5. HR , 's, MAP's 68-75, RR 25, SPo2 88-92%. Temp 96 CVP 14 FLAT. Pt unresponsive to stimuli, PERRLA BS Clear BBS = CTA diminished RLL noted. S1 S2 no extra tones noted. Wound vac in place scant serosanguinous drainage noted. approx 70mL brown drainage from NGT. Hypoactive bowel sounds. Soft nontender abd. Anuric pt on CRRT  tolerating well. Right radial, Right DP bilat PTs DAVE via doppler. L ulnar L brachial, r radial left DP R popliteal pulses noted via doppler. Remains on Vaso, Levo and heparin. Levo weaned to maintain MAP >60 Repositioned in bed as tolerated. multiple blisters and mottled extremities noted. Pt and family updated on POC see chart and doc flow sheets for details.

## 2019-09-04 NOTE — ASSESSMENT & PLAN NOTE
Contributing Nutrition Diagnosis  Malnutrition in the context of Chronic Illness/Injury    Related to (etiology):  CF s/p BLTx 6/2018 and redo BLTx 6/2019    Signs and Symptoms (as evidenced by):  Energy Intake: <50% of estimated energy requirement for 2 days PTA per , likely inadequate before that  Body Fat Depletion: mild depletion of orbitals and thoracic and lumbar region   Muscle Mass Depletion: mild depletion of temples, clavicle region, interosseous muscle and lower extremities   Weight Loss: 15.3% x 3 months (some likely fluid related)   Fluid Accumulation: mild    Interventions/Recommendations (treatment strategy):  Collaboration of nutrition care with other providers    Nutrition Diagnosis Status:  Continues

## 2019-09-04 NOTE — PROGRESS NOTES
CC fellow notified O2 sat 87/88%. Received in report Dr. Alvarez wanted O2 sat >90% Pt previously 94-94%. Reviewed recent gas c MD. No signs of resp distress. Md stated will assess patient. Ok with O2 sat 88%. Will continue to monitor.

## 2019-09-04 NOTE — LOPA/MORA/SWTA/AOC/AEB
Thank you for the referral. This patient may be a candidate for Organ, Tissue, and Eye donation. Please call [facility] ([phone]) with any plans to perform Brain Death Studies or with any plans to discuss withdrawal of care.

## 2019-09-04 NOTE — PROGRESS NOTES
Dr. Alvarez at bedside. Made aware of Sats of 85%. Peep changed to 8 from 5 on the vent. Sats currently 86%.Will monitor.

## 2019-09-04 NOTE — SUBJECTIVE & OBJECTIVE
[facility]  Lung Transplant  Discharge Summary      Patient Name: Juanita Ibarra  MRN: [id number]  Admission Date: 8/26/2019  Hospital Length of Stay: 9 days  Discharge Date and Time: 09/04/2019 3:05 PM  Attending Physician: Jake Alvarez MD   Discharging Provider: Reyes Will NP  Primary Care Provider: Primary Doctor No     HPI: Ms. Ibarra is a 30 yo female s/p bilateral re-transplant (6/18/19) who presented to the ED in acute respiratory distress. Per patient's caregiver, patient was doing well since her hospital discharge, but reports fevers (tmax 101.5) x 2 days, cough with occasional thin sputum, and severe dyspnea requiring >5L oxygen over the weekend. Patient's caregiver also reports poor appetite and PO intake over the last week. Upon arrival to ED, oxygen saturations were in mid 70s while on NRB and patient was placed on BiPAP which was transitioned to 25L comfort flow at 100% FiO2. Patient tachycardic and hypotensive, and received 1L IVF and was started on levo and vaso to maintain MAPs >65. VBG with metabolic acidosis (7.245/40.7/51/17). She was transferred to the ICU, intubated, and started on Zerbaxa/Vancomycin.        Procedure(s) (LRB):  EXPLORATION, WOUND - right chest (N/A)     Hospital Course: shock  Multifactorial given MRSA bacteremia and PNA. History of recurrent  pseudomonal infections and MRSA. Patient s/p PEA arrest 8/31.  She was treated with Vancomycin, Micafungin, VFend, and Zerbaxa. Required multiple pressors to maintain MAPs >65 and she was unable to be completely weaned off.  She remained on ventilator, and was unable to be weaned to extubation.      She had been off sedation for over 36 hours and remained unresponsive, with only reactive pupils.  Her condition became more unstable the past night and her oxygenation continued to worsen despite increase in fluid removal and alveolar recruitment efforts.  There was a suspicion that her  decompensation was due to a pulmonary embolism from her RA clot.  Due to her critical state and thrombocytopenia, treating her with anticoagulation would not lead to her recovery.  After a lengthy discussion between her family and Dr. Alvarez, the decision was made to withdraw life support.  It was discontinued at 14:04 and she was pronounced at 14:12 by Dr. Alvarez.  The cause of death was severe sepsis from an SVC/RA clot.  Pulmonary embolism was highly suspected, but unable to confirm.     S/P lung transplant  Underwent bilateral re-transplant for CARLOS EDUARDO on 6/18/19.  She was treated with prophylactic coverage and immunosuppression per protocol.     Immunosuppression  Prednisone was continued. Tacrolimus was held in the setting of LISBETH. Daily tacrolimus levels were monitored.      Prophylactic antibiotic  She was treated with voriconazole. Dapsone and valcyte were held      Thrombocytopenia, unspecified  Multifactorial - medications, sepsis.      Chronic pain with opiate use  She was on fentanyl and versed infusions.     CF related Pancreatic insufficiency  Was NPO due to criticalness of condition.     LISBETH (acute kidney injury)  Nephrology followed and assisted with CRRT.        Acute respiratory failure  Reintubated 8/30 evening.       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  .     Bacteremia  Right chest port removed. Repeat blood cultures with MRSA. ID followed and directed antimicrobial coverage and care.  Was most recently treated with vancomycin, Zerbaxa, and Micafungin.      Arterial occlusion, lower extremity  Bilateral lower and upper extremities with severe mottling. Vascular surgery was consulted for recs.         Consults (From admission, onward)        Status Ordering Provider     Inpatient consult to Endocrinology  Once     Provider:  (Not yet assigned)    DUSTY Pearl     Inpatient consult to General Surgery  Once     Provider:  (Not yet assigned)     Completed SARIKA SHERMAN     Inpatient consult to Infectious Diseases  Once     Provider:  (Not yet assigned)    Completed SARIKA SHERMAN     Inpatient consult to Nephrology  Once     Provider:  (Not yet assigned)    Completed PADDY FRANKS     Inpatient consult to Registered Dietitian/Nutritionist  Once     Provider:  (Not yet assigned)    Completed SARIKA SHERMAN     Inpatient consult to Vascular Surgery  Once     Provider:  (Not yet assigned)    Completed DUSTY EDWARD          Significant Diagnostic Studies: Labs:   BMP:   Recent Labs   Lab 09/03/19 1326 09/03/19 2202 09/04/19  0408   *  192* 114*  114* 117*  117*   *  125* 129*  129* 136  136   K 5.3*  5.3* 4.0  4.0 3.5  3.5   CL 89*  89* 91*  91* 93*  93*   CO2 22*  22* 27  27 28  28   BUN 26*  26* 11  11 6  6   CREATININE 1.8*  1.8* 0.8  0.8 0.5  0.5   CALCIUM 6.8*  6.8* 7.6*  7.6* 7.9*  7.9*   MG 2.4  2.4 2.0  2.0 1.8  1.8   , CMP   Recent Labs   Lab 09/03/19  0756 09/03/19  1326 09/03/19 2006 09/03/19 2202 09/04/19 0408 09/04/19  0720   NA  --  125*  125*  --  129*  129* 136  136  --    K  --  5.3*  5.3*  --  4.0  4.0 3.5  3.5  --    CL  --  89*  89*  --  91*  91* 93*  93*  --    CO2  --  22*  22*  --  27  27 28  28  --    GLU  --  192*  192*  --  114*  114* 117*  117*  --    BUN  --  26*  26*  --  11  11 6  6  --    CREATININE  --  1.8*  1.8*  --  0.8  0.8 0.5  0.5  --    CALCIUM  --  6.8*  6.8*  --  7.6*  7.6* 7.9*  7.9*  --    PROT 4.6*  --  4.7*  --   --  4.7*   ALBUMIN 1.1* 1.2*  1.2* 1.2* 1.1*  1.1* 1.1*  1.1* 1.2*   BILITOT 2.0*  --  2.2*  --   --  2.1*   ALKPHOS 666*  --  537*  --   --  475*   *  --  447*  --   --  354*   *  --  305*  --   --  269*   ANIONGAP  --  14  14  --  11  11 15  15  --    ESTGFRAFRICA  --  42.9*  42.9*  --  >60.0  >60.0 >60.0  >60.0  --    EGFRNONAA  --  37.2*  37.2*  --  >60.0  >60.0 >60.0  >60.0  --      and CBC   Recent Labs   Lab 09/03/19  1253  09/03/19 2006 09/04/19  0408   WBC 9.86  --  10.20 4.92   HGB 7.4*  --  7.8* 7.7*   HCT 23.0*   < > 24.5* 22.5*   PLT 53*  --  45* 22*    < > = values in this interval not displayed.     Microbiology:   Blood Culture   Lab Results   Component Value Date    LABBLOO  09/03/2019     Gram stain aer bottle: Gram positive cocci in chains resembling Strep     LABBLOO  09/03/2019     Gram stain lisa bottle: Gram positive cocci in chains resembling Strep     LABBLOO  09/03/2019     Results called to and read back by: Gracie Hartman RN 09/04/2019      LABBLOO 06:00 09/03/2019   , Sputum Culture   Lab Results   Component Value Date    GSRESP <10 epithelial cells per low power field. 08/26/2019    GSRESP Few WBC's 08/26/2019    GSRESP Many Gram positive cocci 08/26/2019    RESPIRATORYC (A) 08/26/2019     METHICILLIN RESISTANT STAPHYLOCOCCUS AUREUS  Many      and Urine Culture    Lab Results   Component Value Date    LABURIN No significant growth 06/17/2019     Radiology: X-Ray: CXR: X-Ray Chest 1 View (CXR):   Results for orders placed or performed during the hospital encounter of 08/26/19   X-Ray Chest 1 View    Narrative    EXAMINATION:  XR CHEST 1 VIEW    CLINICAL HISTORY:  intubated;    TECHNIQUE:  Single frontal view of the chest was performed.    COMPARISON:  September 3, 2019 452 hours    FINDINGS:  ET tube is noted the distal tip just above the level of the ness, left-sided central venous catheter noted, stable appearance, enteric tube noted distal tip subdiaphragmatic below the field of view.  Additional leads/line/objects overlie the patient.  Postoperative cardiothoracic change noted.  The cardiomediastinal silhouette appears stable.  Abnormal pattern of pulmonary opacities bilaterally again noted however with improvement, improved aeration.  Continued appearance of pleural fluid on the right, stable.  There is no pneumothorax seen.  The osseous structures appear  intact.      Impression    Mild interval improvement, as above.      Electronically signed by: Riley Sandoval  Date:    09/04/2019  Time:    06:20    and X-Ray Chest PA and Lateral (CXR): No results found for this visit on 08/26/19.    Pending Diagnostic Studies:     Procedure Component Value Units Date/Time    CMV DNA, quantitative, PCR [[id number]] Collected:  09/01/19 2204    Order Status:  Sent Lab Status:  In process Updated:  09/01/19 2223    Specimen:  Blood     Vancomycin, random [[id number]] Collected:  09/04/19 0408    Order Status:  Sent Lab Status:  In process Updated:  09/04/19 0409    Specimen:  Blood         Final Active Diagnoses:    Diagnosis Date Noted POA    PRINCIPAL PROBLEM:  Septic shock [A41.9, R65.21] 08/26/2019 Yes    Acute respiratory failure [J96.00] 08/26/2019 Yes    S/P lung transplant [Z94.2] 10/24/2015 Not Applicable    Immunosuppression [D89.9] 06/12/2014 Yes    Prophylactic antibiotic [Z79.2] 06/30/2014 Not Applicable    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9] 08/27/2019 Not Applicable    Alteration in skin integrity [R23.9] 08/26/2019 Yes    Thrombocytopenia, unspecified [D69.6] 12/04/2018 No    Acute hyperglycemia [R73.9] 06/12/2014 Unknown    Moderate malnutrition [E44.0] 12/20/2013 Yes      Problems Resolved During this Admission:    Diagnosis Date Noted Date Resolved POA    On  enteral nutrition [Z78.9] 2019 Unknown      Discharged Condition:     Disposition: Patient     Medications:  None (patient  at medical facility)\      Chambers S KUMAR Will  Lung Transplant  [facility]

## 2019-09-04 NOTE — PROGRESS NOTES
[facility]  Adult Nutrition  Progress Note    SUMMARY       Recommendations  Recommendation/Intervention:   As medically appropriate, recommend initiating TF of Impact Peptide 1.5 at a goal rate of 40 mL/hr - to provide 1440 kcal/day, 90g protein/day, and 739mL free fluid/day.   RD to monitor.    Goals: Patient to receive nutrition by RD follow-up  Nutrition Goal Status: goal not met  Communication of RD Recs: reviewed with physician    Reason for Assessment  Reason For Assessment: RD follow-up  Diagnosis: infection/sepsis  Relevant Medical History: CF s/p BLTx 2018 and redo BLTx 2019, HTN, seizures  General Information Comments: Extubated but required reintubation . Coded . Currently intubated, sedated. Paralytic discontinued. Patient with ischemic limbs and unresponsive. NFPE complted , patient continues with moderate malnutrition.  Nutrition Discharge Planning: Unable to determine at this time.    Nutrition Risk Screen  Nutrition Risk Screen: tube feeding or parenteral nutrition    Nutrition/Diet History  Spiritual, Cultural Beliefs, Adventist Practices, Values that Affect Care: no  Factors Affecting Nutritional Intake: NPO, on mechanical ventilation    Anthropometrics  Temp: 98.8 °F (37.1 °C)  Height: 5' (152.4 cm)  Height (inches): 60 in  Weight Method: Bed Scale  Weight: 70 kg (154 lb 5.2 oz)  Weight (lb): 154.32 lb  Ideal Body Weight (IBW), Female: 100 lb  % Ideal Body Weight, Female (lb): 115 lb  BMI (Calculated): 22.5  BMI Grade: 18.5-24.9 - normal  Usual Body Weight (UBW), k kg(2019 per chart review)  % Usual Body Weight: 84.92  % Weight Change From Usual Weight: -15.25 %    Lab/Procedures/Meds  Pertinent Labs Reviewed: reviewed  Pertinent Labs Comments: Cl 93, Glu 117, Ca 7.9, Alb 1.2  Pertinent Medications Reviewed: reviewed  Pertinent Medications Comments: pantoprazole, fentanyl, levophed, vasopressin    Estimated/Assessed Needs  Weight Used For Calorie  Calculations: 50 kg (110 lb 3.7 oz)  Energy Calorie Requirements (kcal): 7574-1178 kcal/day  Energy Need Method: Kcal/kg(25-30)  Protein Requirements: 75 g/day(1.5 g/kg)  Weight Used For Protein Calculations: 50 kg (110 lb 3.7 oz)  Fluid Requirements (mL): 1 mL/kcal or per MD  Estimated Fluid Requirement Method: RDA Method  RDA Method (mL): 1250    Nutrition Prescription Ordered  Current Diet Order: NPO    Evaluation of Received Nutrient/Fluid Intake  I/O: +13.2L since admit  Comments: LBM 9/3  % Intake of Estimated Energy Needs: 0 - 25 %  % Meal Intake: NPO    Nutrition Risk  Level of Risk/Frequency of Follow-up: high(2x/week)     Assessment and Plan  Moderate malnutrition  Contributing Nutrition Diagnosis  Malnutrition in the context of Chronic Illness/Injury    Related to (etiology):  CF s/p BLTx 6/2018 and redo BLTx 6/2019    Signs and Symptoms (as evidenced by):  Energy Intake: <50% of estimated energy requirement for 2 days PTA per , likely inadequate before that  Body Fat Depletion: mild depletion of orbitals and thoracic and lumbar region   Muscle Mass Depletion: mild depletion of temples, clavicle region, interosseous muscle and lower extremities   Weight Loss: 15.3% x 3 months (some likely fluid related)   Fluid Accumulation: mild    Interventions/Recommendations (treatment strategy):  Collaboration of nutrition care with other providers    Nutrition Diagnosis Status:  Continues    Monitor and Evaluation  Food and Nutrient Intake: energy intake, enteral nutrition intake  Food and Nutrient Adminstration: enteral and parenteral nutrition administration  Anthropometric Measurements: weight, weight change  Biochemical Data, Medical Tests and Procedures: electrolyte and renal panel, gastrointestinal profile, inflammatory profile  Nutrition-Focused Physical Findings: overall appearance     Malnutrition Assessment  Malnutrition Type: chronic illness  Orbital Region (Subcutaneous Fat Loss): mild  depletion  Upper Arm Region (Subcutaneous Fat Loss): (DAVE, restrained)  Thoracic and Lumbar Region: mild depletion   [place] Region (Muscle Loss): mild depletion  Clavicle Bone Region (Muscle Loss): mild depletion  Clavicle and Acromion Bone Region (Muscle Loss): mild depletion  Scapular Bone Region (Muscle Loss): (DAVE)  Dorsal Hand (Muscle Loss): mild depletion  Patellar Region (Muscle Loss): mild depletion  Anterior Thigh Region (Muscle Loss): mild depletion  Posterior Calf Region (Muscle Loss): mild depletion   Edema (Fluid Accumulation): 1-->trace     Nutrition Follow-Up  RD Follow-up?: Yes

## 2019-09-04 NOTE — PROGRESS NOTES
Dr Alvarez discussed patient condition and poor prognosis with family. At this time, the decision was made to make the patient a DNR. At 1405 care was withdrawn and pt was extubated. Dr Alvarez declared her  at 1412. Cause of death was severe sepsis from MRSA.  and staff with family at time of death.

## 2019-09-04 NOTE — PROGRESS NOTES
Dr. Newell at bedside. O2 sat 85%. Repeat gas performed. MD increased FiO2 to 50%. Chest x-ray ordered. Will continue to monitor.

## 2019-09-04 NOTE — CARE UPDATE
BG goal 140-180  BG at or below goal ranges with no prn SQ insulin requirements.    Continue low dose correction scale prn BG excursions  BG monitoring q 4 hrs while NPO    ** Please call Endocrine for any BG related issues **

## 2019-09-04 NOTE — PT/OT/SLP PROGRESS
Occupational Therapy      Patient Name:  Juanita Ibarra   MRN:  [id number]    Patient not seen today secondary to Other (Comment)(Pt not appropriate for OT.  ). Will follow-up when pt is medically stable and new OT orders are received.    LUCIA Crandall  9/4/2019

## 2019-09-04 NOTE — PROGRESS NOTES
Pt O2 sat 85%. 100% bumped, O2 sat increased to 88%. Dr. Newell notified, will come to bedside. Will continue to monitor.

## 2019-09-04 NOTE — PROGRESS NOTES
SW present in room with family, , and Dr. Alvarez.   Pt made DNR earlier today and was withdrawn on in the afternoon.  Dr. Alvarez called time of death at 14:12.  SW offered support to family along with team.  Spouse remained in room with  nearby.  Family moved to waiting room. No additional needs voiced.

## 2019-09-04 NOTE — PROGRESS NOTES
Dr. Newell at bedside O2 sat 87% Fio2 60% PEEP 5 MD inc Fio2 to 100% order to leave in place Dr. Alvarez notified by Dr. Newell. will continue to monitor.

## 2019-09-05 ENCOUNTER — TELEPHONE (OUTPATIENT)
Dept: TRANSPLANT | Facility: CLINIC | Age: 29
End: 2019-09-05

## 2019-09-05 LAB
BACTERIA BLD CULT: ABNORMAL
CMV DNA SERPL NAA+PROBE-ACNC: NORMAL IU/ML

## 2019-09-05 NOTE — TELEPHONE ENCOUNTER
----- Message from Kaylah Holden sent at 9/5/2019 11:30 AM CDT -----  Contact: pt mom Rocio  Reason: Pt mom ask for a call back no other info provided    Communication: [phone]

## 2019-09-05 NOTE — DISCHARGE SUMMARY
[facility]  Lung Transplant  Discharge Summary      Patient Name: Juanita Ibarra  MRN: [id number]  Admission Date: 8/26/2019  Hospital Length of Stay: 9 days  Discharge Date and Time: 09/05/2019 1:16 PM  Attending Physician: Beth att. providers found   Discharging Provider: Reyes Will NP  Primary Care Provider: Primary Doctor Beth     HPI: Ms. Ibarra is a 28 yo female s/p bilateral re-transplant (6/18/19) who presented to the ED in acute respiratory distress. Per patient's caregiver, patient was doing well since her hospital discharge, but reports fevers (tmax 101.5) x 2 days, cough with occasional thin sputum, and severe dyspnea requiring >5L oxygen over the weekend. Patient's caregiver also reports poor appetite and PO intake over the last week. Upon arrival to ED, oxygen saturations were in mid 70s while on NRB and patient was placed on BiPAP which was transitioned to 25L comfort flow at 100% FiO2. Patient tachycardic and hypotensive, and received 1L IVF and was started on levo and vaso to maintain MAPs >65. VBG with metabolic acidosis (7.245/40.7/51/17). She was transferred to the ICU, intubated, and started on Zerbaxa/Vancomycin.     Procedure(s) (LRB):  EXPLORATION, WOUND - right chest (N/A)     Hospital Course: Hospital Course: shock  Multifactorial given MRSA bacteremia and PNA. History of recurrent  pseudomonal infections and MRSA. Patient s/p PEA arrest 8/31.  She was treated with Vancomycin, Micafungin, VFend, and Zerbaxa. Required multiple pressors to maintain MAPs >65 and she was unable to be completely weaned off.  She remained on ventilator, and was unable to be weaned to extubation.      She had been off sedation for over 36 hours and remained unresponsive, with only reactive pupils.  Her condition became more unstable the past night and her oxygenation continued to worsen despite increase in fluid removal and alveolar recruitment efforts.  There was a suspicion that  her decompensation was due to a pulmonary embolism from her RA clot.  Due to her critical state and thrombocytopenia, treating her with anticoagulation would not lead to her recovery.  After a lengthy discussion between her family and Dr. Alvarez, the decision was made to withdraw life support.  It was discontinued at 14:04 and she was pronounced at 14:12 by Dr. Alvarez.  The cause of death was severe sepsis from an SVC/RA clot.  Pulmonary embolism was highly suspected, but unable to confirm.     S/P lung transplant  Underwent bilateral re-transplant for CARLOS EDUARDO on 6/18/19.  She was treated with prophylactic coverage and immunosuppression per protocol.     Immunosuppression  Prednisone was continued. Tacrolimus was held in the setting of LISBETH. Daily tacrolimus levels were monitored.      Prophylactic antibiotic  She was treated with voriconazole. Dapsone and valcyte were held      Thrombocytopenia, unspecified  Multifactorial - medications, sepsis.      Chronic pain with opiate use  She was on fentanyl and versed infusions.     CF related Pancreatic insufficiency  Was NPO due to criticalness of condition.     LISBETH (acute kidney injury)  Nephrology followed and assisted with CRRT.        Acute respiratory failure  Reintubated 8/30 evening.       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  .     Bacteremia  Right chest port removed. Repeat blood cultures with MRSA. ID followed and directed antimicrobial coverage and care.  Was most recently treated with vancomycin, Zerbaxa, and Micafungin.      Arterial occlusion, lower extremity  Bilateral lower and upper extremities with severe mottling. Vascular surgery was consulted for recs.        Consults (From admission, onward)        Status Ordering Provider     Inpatient consult to Endocrinology  Once     Provider:  (Not yet assigned)    DUSTY Pearl     Inpatient consult to General Surgery  Once     Provider:  (Not yet assigned)     Completed SARIKA SHERMAN     Inpatient consult to Infectious Diseases  Once     Provider:  (Not yet assigned)    Completed SARIKA SHERMAN     Inpatient consult to Nephrology  Once     Provider:  (Not yet assigned)    Completed PADDY FRANKS     Inpatient consult to Registered Dietitian/Nutritionist  Once     Provider:  (Not yet assigned)    Completed SARIKA SHERMAN     Inpatient consult to Vascular Surgery  Once     Provider:  (Not yet assigned)    Completed DUSTY EDWARD          Significant Diagnostic Studies: Labs: All labs within the past 24 hours have been reviewed  Microbiology:   Blood Culture   Lab Results   Component Value Date    LABBLOO  09/03/2019     Gram stain aer bottle: Gram positive cocci in chains resembling Strep     LABBLOO  09/03/2019     Gram stain lisa bottle: Gram positive cocci in chains resembling Strep     LABBLOO  09/03/2019     Results called to and read back by: Gracie Hartman RN 09/04/2019      LABBLOO 06:00 09/03/2019    LABBLOO (A) 09/03/2019     METHICILLIN RESISTANT STAPHYLOCOCCUS AUREUS  ID consult required at [facility] and [facility].  For susceptibility see order # [id number]      LABBLOO ENTEROCOCCUS FAECIUM  Susceptibility pending   (A) 09/03/2019    and Sputum Culture   Lab Results   Component Value Date    GSRESP <10 epithelial cells per low power field. 08/26/2019    GSRESP Few WBC's 08/26/2019    GSRESP Many Gram positive cocci 08/26/2019    RESPIRATORYC (A) 08/26/2019     METHICILLIN RESISTANT STAPHYLOCOCCUS AUREUS  Many       Radiology: X-Ray: CXR: X-Ray Chest 1 View (CXR):   Results for orders placed or performed during the hospital encounter of 08/26/19   X-Ray Chest 1 View    Narrative    EXAMINATION:  XR CHEST 1 VIEW    CLINICAL HISTORY:  intubated;    TECHNIQUE:  Single frontal view of the chest was performed.    COMPARISON:  September 3, 2019 452 hours    FINDINGS:  ET tube is noted the distal tip just  above the level of the ness, left-sided central venous catheter noted, stable appearance, enteric tube noted distal tip subdiaphragmatic below the field of view.  Additional leads/line/objects overlie the patient.  Postoperative cardiothoracic change noted.  The cardiomediastinal silhouette appears stable.  Abnormal pattern of pulmonary opacities bilaterally again noted however with improvement, improved aeration.  Continued appearance of pleural fluid on the right, stable.  There is no pneumothorax seen.  The osseous structures appear intact.      Impression    Mild interval improvement, as above.      Electronically signed by: Riley Sandoval  Date:    09/04/2019  Time:    06:20       Pending Diagnostic Studies:     Procedure Component Value Units Date/Time    Vancomycin, random [[id number]] Collected:  09/04/19 0408    Order Status:  Sent Lab Status:  In process Updated:  09/04/19 0409    Specimen:  Blood         Final Active Diagnoses:    Diagnosis Date Noted POA    PRINCIPAL PROBLEM:  Septic shock [A41.9, R65.21] 08/26/2019 Yes    Acute respiratory failure [J96.00] 08/26/2019 Yes    S/P lung transplant [Z94.2] 10/24/2015 Not Applicable    Immunosuppression [D89.9] 06/12/2014 Yes    Prophylactic antibiotic [Z79.2] 06/30/2014 Not Applicable    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9] 08/27/2019 Not Applicable     Alteration in skin integrity [R23.9] 2019 Yes    Thrombocytopenia, unspecified [D69.6] 2018 No    Acute hyperglycemia [R73.9] 2014 Unknown    Moderate malnutrition [E44.0] 2013 Yes      Problems Resolved During this Admission:    Diagnosis Date Noted Date Resolved POA    On enteral nutrition [Z78.9] 2019 Unknown      Discharged Condition:     Disposition:     Medications:  None (patient  at medical facility)  Patient Instructions:   No discharge procedures on file.  Follow Up:      Reyes Will NP  Lung Transplant  [facility]

## 2019-09-05 NOTE — TELEPHONE ENCOUNTER
Returned call to the patient's mother Rocio, who asked if our team can fill out a School Excuse form for Gisel's best friend, Francisca Cortes, who was with Gisel during her hospital stay and who is currently assisting the family with  services for Gisel.  She asked that the school excuse be dated 19 - 19, as the  will be held 19.  Informed Rocio that I will complete an excuse form and leave it at the transplant clinic reception desk for her to  at her convenience.  Rocio verbalized her understanding and denied having any additional needs at this time.

## 2019-09-05 NOTE — HOSPITAL COURSE
Hospital Course: shock  Multifactorial given MRSA bacteremia and PNA. History of recurrent  pseudomonal infections and MRSA. Patient s/p PEA arrest 8/31.  She was treated with Vancomycin, Micafungin, VFend, and Zerbaxa. Required multiple pressors to maintain MAPs >65 and she was unable to be completely weaned off.  She remained on ventilator, and was unable to be weaned to extubation.      She had been off sedation for over 36 hours and remained unresponsive, with only reactive pupils.  Her condition became more unstable the past night and her oxygenation continued to worsen despite increase in fluid removal and alveolar recruitment efforts.  There was a suspicion that her decompensation was due to a pulmonary embolism from her RA clot.  Due to her critical state and thrombocytopenia, treating her with anticoagulation would not lead to her recovery.  After a lengthy discussion between her family and Dr. Alvarez, the decision was made to withdraw life support.  It was discontinued at 14:04 and she was pronounced at 14:12 by Dr. Alvarez.  The cause of death was severe sepsis from an SVC/RA clot.  Pulmonary embolism was highly suspected, but unable to confirm.     S/P lung transplant  Underwent bilateral re-transplant for CARLOS EDUARDO on 6/18/19.  She was treated with prophylactic coverage and immunosuppression per protocol.     Immunosuppression  Prednisone was continued. Tacrolimus was held in the setting of LISBETH. Daily tacrolimus levels were monitored.      Prophylactic antibiotic  She was treated with voriconazole. Dapsone and valcyte were held      Thrombocytopenia, unspecified  Multifactorial - medications, sepsis.      Chronic pain with opiate use  She was on fentanyl and versed infusions.     CF related Pancreatic insufficiency  Was NPO due to criticalness of condition.     LISBETH (acute kidney injury)  Nephrology followed and assisted with CRRT.        Acute respiratory failure  Reintubated 8/30 evening.        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  .     Bacteremia  Right chest port removed. Repeat blood cultures with MRSA. ID followed and directed antimicrobial coverage and care.  Was most recently treated with vancomycin, Zerbaxa, and Micafungin.      Arterial occlusion, lower extremity  Bilateral lower and upper extremities with severe mottling. Vascular surgery was consulted for recs.

## 2019-09-05 NOTE — PROGRESS NOTES
I have personally interviewed and examined the patient. Clinical, laboratorial and imaging information available in medical records were reveiwed by me. I agree with the assessment and recommendations provided by Dr. Calvin and MS4 who was under my supervision.

## 2019-09-05 NOTE — LETTER
[facility]  [address]  [facility] [address]  Phone: [phone]   2019        To Whom it May Concern:    Please excuse Francisca Cortes from school and any assignments/tests missed from 19 through 19.  She was at [facility] in [place] at the bedside of her best friend who developed a life-threatening health condition.  Sadly, her friend Juanita Ibarra , and Francisca is assisting her family with planning  arrangements.    Your consideration in this matter is greatly appreciated.  If you have any questions or concerns, please do not hesitate to call.    Sincerely,           Zuleima Calero RN   Clinical Transplant Supervisor - Lung Transplant  [facility]

## 2019-09-06 LAB
ACID FAST MOD KINY STN SPEC: NORMAL
BACTERIA BLD CULT: ABNORMAL
MYCOBACTERIUM SPEC QL CULT: NORMAL

## 2019-09-06 NOTE — PHYSICIAN QUERY
"PT Name: Juanita Ibarra  MR #: [id number]    Physician Query Form - Hematology Clarification      CDS/: Alicia Ornelas RN              Contact information:[phone]    This form is a permanent document in the medical record.      Query Date: September 6, 2019    By submitting this query, we are merely seeking further clarification of documentation. Please utilize your independent clinical judgment when addressing the question(s) below.    The Medical record contains the following:   Indicators  Supporting Clinical Findings Location in Medical Record   x "Anemia" documented Patient with profound decompensation last night with shock requiring max doses of multiple pressors, reintubation with paralysis/pronation, possible anemia  of blood loss, and acidosis culminating in PEA arrest requiring 1 round of CPR.    Progress Note 8/31       Infectious Disease          x H & H = Hgb  9.1-->8.1 --> 8.0 --> 6.4  -->12.2  Hct  29 --> 26.2-->25.7-->21.8-->37.1   Lab 8/27, 8/28, 8/29, 8/30,8/31   x BP =                     HR= Vital Signs (24h Range):   Pulse:  [103-154] 119   BP: ()/(52-69) 127/69   Arterial Line BP: ()/(38-86) 112/61    Progress Note 8/30      Transplant lung    "GI bleeding" documented      Acute bleeding (Non GI site)     x Transfusion(s) Hb dropped yesterday to 6s, has received 3 u PRBCs since with improvement to 12s.   Progress Note 8/31       Nephrology    Treatment:      Other:        Provider, please specify diagnosis or diagnoses associated with above clinical findings.    [ x ] Acute blood loss anemia     [  ] Chronic blood loss anemia     [  ] Precipitous drop in Hematocrit       [  ] Anemia of chronic disease ( Specify chronic disease)         [  ] Other (Specify):   [  ] Clinically Undetermined       [  ] Other Hematological Diagnosis (please specify):     [  ] Clinically Undetermined       Please document in your progress notes daily for the duration of treatment, until " resolved, and include in your discharge summary.

## 2019-09-06 NOTE — PHYSICIAN QUERY
PT Name: Juanita Ibarra  MR #: [id number]     PHYSICIAN QUERY -  ELECTROLYTE CLARIFICATION      CDS/: Alicia Ornelas RN              Contact information:[phone]  This form is a permanent document in the medical record.     Query Date: September 6, 2019    By submitting this query, we are merely seeking further clarification of documentation to reflect the severity of illness of your patient. Please utilize your independent clinical judgment when addressing the question(s) below.    The Medical record reflects the following:     Indicators   Supporting Clinical Findings Location in Medical Record   x Lab Value(s) Potassium  = 5.5--> 5.7 --> 3.5 Lab 9/2, 9/3, 9/4     x Treatment                                 Medication -Patient was seen on CRRT which was running for uremic toxin clearance/ UF for volume management.   -Patient hemodynamically not stable for intermittent HD.   -Will continue CRRT for volume and toxin clearance. Adjust UF as tolerated. Follow labs serially while on CRRT to monitor electrolytes and follow replacement protocol as needed.   -CRRT prescription, dialysate bath was reviewed and changes made as necessary.        Progress Note 9/2      Nephrology    Other       Provider, please specify the diagnosis or diagnoses that correspond(s) to the above indicators. Librado all that apply:    [ x  ] Hyperkalemia     [   ] Other electrolyte disturbance (please specify): _______     [   ]  Clinically Undetermined       Please document in your progress notes daily for the duration of treatment until resolved, and include in your discharge summary.

## 2019-09-16 LAB — FUNGUS SPEC CULT: ABNORMAL

## 2019-09-25 LAB — FUNGUS SPEC CULT: ABNORMAL

## 2019-09-27 LAB
ACID FAST MOD KINY STN SPEC: NORMAL
MYCOBACTERIUM SPEC QL CULT: NORMAL

## 2019-10-17 LAB
ACID FAST MOD KINY STN SPEC: NORMAL
MYCOBACTERIUM SPEC QL CULT: NORMAL

## 2019-10-28 LAB
ACID FAST MOD KINY STN SPEC: NORMAL
MYCOBACTERIUM SPEC QL CULT: NORMAL

## 2020-05-30 NOTE — TELEPHONE ENCOUNTER
"Spoke to patient. States in [facility] for an "ear infection". Call PRN  " [facility]

## 2020-09-11 NOTE — TELEPHONE ENCOUNTER
Ongoing SW/CM Assessment/Plan of Care Note     See SW/CM flowsheets for goals and other objective data.    Patient/Family discharge goal (s):  Goal #1: Extended Care Facility discharge arranged          PT Recommendation:  Recommendation for Discharge: PT WI: Assisted living    OT Recommendation:  Recommendations for Discharge: OT WI: Sub-acute nursing home    SLP Recommendation:  Recommendations for Discharge: SLP: subacute rehab    Disposition:  Planned Discharge Destination: Location not determined at this time    Progress note:   Follow up discharge planning.  Message received from [facility] that they have a bed for Dave on .  SW is following for discharge needs.     TAYLER updated [facility] that Dave  on this date.            ----- Message from Caitlin Thomason sent at 11/7/2017  2:59 PM CST -----  Contact: patient   Calling to speak with you about her swallow test results.         Please call

## 2020-09-14 NOTE — NURSING
"HPI:    Pt. With h/o HTN comes in for ED follow up today. She was in the ED 2018 and 2020 for HTN; labs were unremarkable. She was given Clonidine and Amlodipine. She remains on Amlodipine. She still has headaches which is new for her. She as a little stress at home. She also has a h/o PMR but she feels her head ache sxs. Are different than those sxs. She states her mother had a \"large stroke\" from Temporal arteri sits. She still has L hip pain. No other HEENT, cardiopulmonary, abdominal, , GYN, neurological, systemic, psychiatric, lymphatic, endocrine, vascular complaints.       Past Medical History:   Diagnosis Date     Hypertension      Polymyalgia rheumatica (H)      Past Surgical History:   Procedure Laterality Date     COLONOSCOPY  2014    Procedure: COMBINED COLONOSCOPY, SINGLE BIOPSY/POLYPECTOMY BY BIOPSY;  COLONOSCOPY;  Surgeon: Carol Ann Plasencia MD;  Location:  GI     ENT SURGERY      tonsilectomy, adenoidectomy     GYN SURGERY  ,     x 2     ORTHOPEDIC SURGERY  2004    (R) shoulder surgery for frozen shoulder     ZAC BSO      for fibroids     PE:    Vitals noted, gen, nad, cooperative, alert, HEENT, she is wearing a mask, neck supple nl rom, no B carotid bruits, lungs with good air movement, RRR, S1, S2, no MRG, abdomen, no acute findings, grossly normal neurological exam.     EKG; SR at 52; no acute findings and no change from 2018.     A/P:    1. HTN; lower today on amlodipine. Note abdominal/pelvic CT imaging and adrenal MRI with adrenal nodule. Biochemical workup with high renin but other values normal. Will get resting echo and follow.   2. See Dr. Orr's ONC note from 2020 regarding evaluation of vertebral bone biopsy. No evidence of a malignant process  3. Mammogram 10/24/2019  4. Immunizations she will wait on influenza vaccination for now   5. Colonoscopy 2014  6. Will get MRI imaging for HA's  7. Off cholesterol mediation and will " Additional emesis episodes noted. Patient with NG tube to low intermittent suction; minimal output. HOB at 90 degrees with patient in upright position. VSS. Given PRN Zofran with no symptom relief. Reyes Will NP notified and new order for phenergan placed by Reyes Will NP. Will carry out orders and continue to monitor symptoms.    check lipids today    Total time spent 40 minutes.  More than 50% of the time spent with Ms. Denise on counseling / coordinating her care

## 2021-06-08 NOTE — ASSESSMENT & PLAN NOTE
CMV D-/R+. Continue OIP with valganciclovir and dapsone. Bronch wash 6/20 and 6/23 with  Pseudomonas and Candida Glabrata. Vancomycin discontinued. Continue ciprofloxacin 400 mg Q8hrs. Continue micafungin.   [employer] Pregnancy And Lactation Text: This medication is Pregnancy Category B and is considered safe. There is some data to suggest avoiding during the third trimester. It is unknown if this medication is excreted in breast milk.

## 2021-07-09 NOTE — ASSESSMENT & PLAN NOTE
Respiratory culture from sputum and RVP pending. Blood cultures with NGTD.  Influenza testing negative. Procalcitonin negative with no leukocytosis. CXR overall stable; however, would consider that right lung does appear to have slight increased areas of opacity than previous imaging. Will plan for cefepime and vancomycin based on previous sensitivities. Continue CPT and scheduled nebs TID.    Parent(s)

## 2021-10-18 NOTE — PROGRESS NOTES
Discharge Note:    SW met with pt to assess needs for discharge. Pt scheduled to discharge home with IV antibiotics. Referral made to Jake with [employer] [phone]/ fx [phone] (Pt previously established with infusion company ). Jake taught yesterday afternoon and meds were delivered to room last night.     while in room team discussed with pt potential option for re transplant in the future. Pt states wants to move locally and inquired what she needed to establish and continue to do for possible retransplantation. Team discussed all topics of adherence with meds, finding providers to cover current pain meds, establishing with psychiatry, and following all recommendations. Pt verbalized understanding. Pt was crying, however states it was due to anxiety.  Pt agrees she needs to establish psychiatry and states when she moves locally next month she will find a provider. LOKI offered assistance with providing resources.     LOKI reviewed discharge plan with pt. Pt aware of, and involved in discharge plan. Pt to discharge home with the assistance her spouse who was present in the room. Pt reports coping adequately with discharge.  Pt verbalized no additional needs or concerns at this time.  Contact information provided. SW to remain available.            Pt has an apt with Dr. Solo on 11/22/21 that need to be rescheduled as the provider is out of the office. Left pt vm, sent pt a text and portal message requesting a return call to jazmine apt. Please jazmine from past apts.

## 2021-10-22 NOTE — ASSESSMENT & PLAN NOTE
Will continue home medications which include Gabapentin 1100mg TID, MS Contin 15mg TID, Oxycodone 10mg prn q6 hours, Tizanidine 4mg.   FAMILY HISTORY:  No pertinent family history in first degree relatives

## 2022-01-06 NOTE — PROGRESS NOTES
"[facility]  Endocrinology  Progress Note    Admit Date: 6/17/2019     Reason for Consult: Management of  Hyperglycemia and CF related pancreatic insufficiency.     Surgical Procedure and Date: lung transplant in 2014; 6/18/19    HPI:   Patient is a 29 y.o. female with a diagnosis of CF, HTN, migraine headache, and osteopenia. 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 No personal history of DM. Endocrinology consulted for BG management.     Lab Results   Component Value Date    HGBA1C 4.7 01/09/2019                 Interval HPI:   Overnight events: Remains in ICU. NAEON. HFNC. BG reasonably well controlled on insulin infusion; rate decreased to 0.4 u/hr per orders plus correction scale. Solumedrol 118 mg; steroid taper per primary.   Eating:   NPO  Awaiting swallow study   Nausea: No  Hypoglycemia and intervention: No  Fever: 99.9  TPN and/or TF: No    BP (!) 145/99 (BP Location: Right arm, Patient Position: Lying)   Pulse (!) 116   Temp 99.9 °F (37.7 °C)   Resp (!) 44   Ht 5' 1" (1.549 m)   Wt 69.2 kg (152 lb 8.9 oz)   LMP 10/02/2016   SpO2 100%   Breastfeeding? No   BMI 28.83 kg/m²      Labs Reviewed and Include    Recent Labs   Lab 06/21/19  0400   *   CALCIUM 8.4*   ALBUMIN 2.9*   PROT 6.1      K 4.6   CO2 29      BUN 13   CREATININE 0.7   ALKPHOS 161*   ALT 31   AST 44*   BILITOT 0.5     Lab Results   Component Value Date    WBC 13.60 (H) 06/21/2019    HGB 8.5 (L) 06/21/2019    HCT 26.6 (L) 06/21/2019    MCV 86 06/21/2019     (L) 06/21/2019     No results for input(s): TSH, FREET4 in the last 168 hours.  Lab Results   Component Value Date    HGBA1C 4.7 01/09/2019       Nutritional status:   Body mass index is 28.83 kg/m².  Lab Results   Component Value Date    ALBUMIN 2.9 (L) 06/21/2019    ALBUMIN 2.9 (L) 06/20/2019    ALBUMIN 2.8 (L) 06/20/2019     Lab Results   Component Value Date    PREALBUMIN 15 (L) " 05/29/2014    PREALBUMIN 11 (L) 05/09/2014    PREALBUMIN 18 (L) 03/19/2014       Estimated Creatinine Clearance: 104.6 mL/min (based on SCr of 0.7 mg/dL).    Accu-Checks  Recent Labs     06/20/19  0854 06/20/19  1003 06/20/19  1102 06/20/19  1204 06/20/19  1318 06/20/19  1400 06/20/19  1534 06/20/19  1944 06/21/19  0008 06/21/19  0354   POCTGLUCOSE 228* 243* 249* 189* 123* 139* 145* 123* 208* 148*       Current Medications and/or Treatments Impacting Glycemic Control  Immunotherapy:    Immunosuppressants         Stop Route Frequency     tacrolimus capsule 0.5 mg      -- SL 2 times daily     mycophenolate (CELLCEPT) 500 mg in dextrose 5 % 100 mL IVPB      -- IV 2 times daily     basiliximab (SIMULECT) 20 mg in dextrose 5 % 50 mL chemo infusion      06/26 1514 IV Every 96 hours        Steroids:   Hormones (From admission, onward)    Start     Stop Route Frequency Ordered    06/21/19 0900  methylPREDNISolone sodium succinate injection 118 mg  (methylprednisolone panel)      06/22 0859 IV Daily 06/18/19 1411        Pressors:    Autonomic Drugs (From admission, onward)    None        Hyperglycemia/Diabetes Medications:   Antihyperglycemics (From admission, onward)    Start     Stop Route Frequency Ordered    06/20/19 1545  insulin regular (Humulin R) 100 Units in sodium chloride 0.9% 100 mL infusion      -- IV Continuous 06/20/19 1442    06/20/19 1542  insulin aspart U-100 pen 0-4 Units      -- SubQ As needed (PRN) 06/20/19 1442          ASSESSMENT and PLAN    * S/P lung transplant  Managed per LUT.   avoid hypoglycemia        Hyperglycemia  BG goal 140 - 180. Discontinue CTS given transplant.         Rewrite insulin infusion at 0.4 u/hr.   BG monitoring every 4 hours and low dose correction scale.         Adrenal cortical steroids causing adverse effect in therapeutic use-resolved as of 6/21/2019  Glucocorticoids markedly increase  glucoses. Expect the steroid taper will help glucose control.         Prophylactic  immunotherapy  May increase insulin resistance.       CF related Pancreatic insufficiency  May impact BG.           Amy Zapata NP  Endocrinology  [facility]   Retention Suture Text: Retention sutures were placed to support the closure and prevent dehiscence.

## 2022-01-08 NOTE — LETTER
May 1, 2019        Shama Dennison  [address]  [address]  [facility]  [place] [address]  Phone: [phone]  Fax: [phone]     Jake Alvarez  [address]  [facility] [address]  Phone: [phone]  Fax: [phone]             [facility]  [address]  [place] [address]  Phone: [phone]   Patient: Juanita Ibarra   MR Number: [id number]   YOB: 1990   Date of Visit: 5/1/2019       Dear Dr. Shama Dennison, Jake Alvarez    Thank you for referring Juanita Ibarra to me for evaluation. Attached you will find relevant portions of my assessment and plan of care.    If you have questions, please do not hesitate to call me. I look forward to following Juanita Ibarra along with you.    Sincerely,    Francisca Morin, DO    Enclosure    If you would like to receive this communication electronically, please contact [email or web address] or [phone] to request [employer] [employer] access.    [facility] [employer] is a tool which provides read-only access to select patient information with whom you have a relationship. Its easy to use and provides real time access to review your patients record including encounter summaries, notes, results, and demographic information.    If you feel you have received this communication in error or would no longer like to receive these types of communications, please e-mail [email or web address]        No indicators present

## 2022-03-15 NOTE — CARE UPDATE
----- Message from Waldemar Linda MD sent at 3/15/2022  2:35 PM CDT -----  .    Your hemoglobin A1c is rising again.  You need to get in touch with your endocrinologist for diabetic medications adjustment.  I am sending a copy of your recent hemoglobin A1c to Dr. Parisi   Written by Waldemar Linda MD on 3/15/2022  2:35 PM CDT  Seen by patient Nas Lawrence on 3/15/2022  2:46 PM     RT attempted to place ETCO monitoring onto PT who stated she does not want to wear it

## 2022-10-20 NOTE — ASSESSMENT & PLAN NOTE
Transitioned to home regimen of MS Contin 15 mg BID.  Oxy 10 mg Q6hrs PRN. Continue to closely monitor status. Limit sedating medications. Ensure patient is fully alert and awake prior to eating to avoid risk of aspiration.    36.6

## 2022-12-08 NOTE — ASSESSMENT & PLAN NOTE
Left message for patient to return call to the clinic to discuss results and the start of new medication.   On Norvasc and Lisinopril at home.      Will monitor blood pressure overnight and resume tomorrow if needed.

## 2024-02-21 NOTE — PROGRESS NOTES
Notified KEARA DUARTE of the following:  Patient's BP this /69, per PA OK to administer scheduled metoprolol and amlodipine.   Patient refusing scheduled Protonix and multivitamin - patient requesting to take home Prilosec and vitamin.   Will continue to monitor.    20ft x2

## 2024-05-03 NOTE — ASSESSMENT & PLAN NOTE
Infection may elevate BG readings  Managed per primary team  Avoid hypoglycemia       Patient AxOx4 and able to ambulate with assistance upon discharge

## 2024-05-28 NOTE — TELEPHONE ENCOUNTER
Received torb from Dr. Morin instructing patient to hold both Vancomycin doses today and will repeat labs in clinic tomorrow when patient comes in for her full clinic visit.  Govind was contacted and spoke to Vince with an update on patient's treatment plan.  We will send new orders when patient resumes treatment.  Contacted patient to discuss the above care plan.  Patient was instructed to hold both dose of the Vancomycin infusions today and we will see her in clinic tomorrow with repeat labs.  Patient will be notified when to resume treatment.  Patient verbalized her understanding and did not have any further questions at this time.  My Chart message was also sent regarding the above.    Transseptal puncture performed.

## 2025-02-05 NOTE — PROGRESS NOTES
Consulted for right transverse chest wound vac dressing change  29 y.o. year old female who presented today for a bronchoscopy.  She is s/p lung re-transplantation for CARLOS EDUARDO.  She was recently started on ceftolozane/tazobactam and micafungin for sputum cultures growing two species of non-albicans Candida and Pseudomonas.  She reports overall not feeling well for the past few weeks and has been having intermittent fevers with increase in sputum production, and low oxygenation while she sleeps. Denies sick contacts and is compliant with her medications.     Right chest transverse wound under right breast from lung transplant  Base is pink with some yellow adipose. Wound vac dressing changed and trac pad bridged out of crease under right breast.Pateint relates her dressings are changed every MWF.  Discussed plan of care with Carlotta Canales PA-C.     08/16/19 1230        Incision/Site 06/18/19 1300 Right Chest transverse   Date First Assessed/Time First Assessed: 06/18/19 1300   Side: Right  Location: Chest  Orientation: transverse   Wound Image    Incision WDL ex   Dressing Appearance Clean;Intact   Drainage Amount None   Drainage Characteristics/Odor No odor   Appearance Pink;Yellow;Moist;Adipose   Red (%), Wound Tissue Color 90 %  (pink)   Yellow (%), Wound Tissue Color 10 %   Periwound Area Intact   Wound Edges Open   Wound Length (cm) 1.5 cm   Wound Width (cm) 14 cm   Wound Depth (cm) 0.1 cm   Wound Volume (cm^3) 2.1 cm^3   Wound Surface Area (cm^2) 21 cm^2   Care Cleansed with:;Sterile normal saline;Applied:;Skin Barrier   Dressing Applied;Other (see comments)  (NPWT dressing)        Negative Pressure Wound Therapy    Placement Date/Time: 07/23/19 0900   Side: Right  Orientation: upper  Location: Breast;Upper quadrant   NPWT Type Vacuum Therapy   Therapy Setting NPWT Continuous therapy   Pressure Setting NPWT 125 mmHg   Therapy Interventions NPWT Dressing changed   Sponges Inserted NPWT Black;1   Sponges Removed NPWT  Black;1     Becca Chauhan RN CWON  [id number]   Transportation coordinated via [facility] with [employer] [phone] for 1:45pm .  Inv# [phone].

## 2025-02-08 NOTE — ASSESSMENT & PLAN NOTE
Continue prednisone.Holding tacrolimus in setting of LISBETH. Daily tacrolimus levels.    The original prescription was discontinued on 5/17/2024 by Kelley Gaspar CPhT     Requested Prescriptions     Refused Prescriptions Disp Refills    DICLOFENAC 50 MG Oral Tab EC [Pharmacy Med Name: Diclofenac Sodium Oral Tablet Delayed Release 50 MG] 60 tablet 0     Sig: TAKE ONE TABLET BY MOUTH TWICE DAILY AS NEEDED     Refused By: SUKUMAR HAYES     Reason for Refusal: Refill not appropriate

## 2025-03-28 NOTE — ASSESSMENT & PLAN NOTE
BG goal 140-180.       januvia 100 mg daily restarted per primary.   Increase novolog 8 units with meals and prn evening meal (patient skips breakfast most days, eats late lunch, and dinner late).   BG monitoring ac/hs and moderate dose correction scale.      Neurology consulted tonight due to worsening of myasthenia gravis, O2 saturations declining into the mid 80s.  Patient continues to have worsening secretions.  Still awaiting HD line placement for initiation of plasmapheresis.  In the meantime, neurology recommends giving a g of Solu-Medrol once as the patient take steroids at home and this should not be a contraindication.  Additionally, elective intubation is recommended as the patient has the capacity to do so.  This patient's condition continues to worsen, at some point he will require intubation even after initiating 1st round of Plex.  1 g of Solu-Medrol ordered to be given now.  House supervisor notified of elective intubation and patient has agreed to intubation until symptoms improve following plasmapheresis.     Tyson Silveira MD

## (undated) DEVICE — ELECTRODE REM PLYHSV RETURN 9

## (undated) DEVICE — Device

## (undated) DEVICE — ADAPTER SWIVEL

## (undated) DEVICE — KIT HEMOSTAT 4.5ML VITAGEL

## (undated) DEVICE — RELOAD ECHELON ENDOPATH 45MM

## (undated) DEVICE — SEE MEDLINE ITEM 152487

## (undated) DEVICE — SHEATH INTRODUCER 7FR 11CM

## (undated) DEVICE — PLEDGET SUT SOFT 3/8X3/16X1/16

## (undated) DEVICE — APPLICATOR CHLORAPREP ORN 26ML

## (undated) DEVICE — SYR SLIP TIP 20CC

## (undated) DEVICE — SYR SLIP TIP 60 CC DISP

## (undated) DEVICE — CONTAINER SPECIMEN STRL 4OZ

## (undated) DEVICE — STAPLE TRI-STAPLE 2.0 CRV TIP

## (undated) DEVICE — UNDERGLOVES BIOGEL PI SZ 7 LF

## (undated) DEVICE — TRAY MINOR GEN SURG

## (undated) DEVICE — LUBRICANT SURGILUBE 2 OZ

## (undated) DEVICE — SUT 6 18IN STEEL MONO CCS

## (undated) DEVICE — SYR ONLY LEUR TIP 30CC

## (undated) DEVICE — GLOVE BIOGEL SKINSENSE PI 8.0

## (undated) DEVICE — FORCEP JAW RADIAL PULM 2X100CM

## (undated) DEVICE — SEE MEDLINE ITEM 146313

## (undated) DEVICE — SEE MEDLINE ITEM 152622

## (undated) DEVICE — SUT 2-0 VICRYL / CT-1

## (undated) DEVICE — BLADE ELECTRO EDGE INSULATED

## (undated) DEVICE — SUT D SPECIAL 4-0 PDS SH

## (undated) DEVICE — DRESSING ADH ISLAND 3.6 X 14

## (undated) DEVICE — SUT 2/0 30IN ETHIBOND

## (undated) DEVICE — WARMER DRAPE STERILE LF

## (undated) DEVICE — POWDER ARISTA AH 3G

## (undated) DEVICE — SEE MEDLINE ITEM 157194

## (undated) DEVICE — PACK SET UP CONVERTORS

## (undated) DEVICE — SYR 10CC LUER LOCK

## (undated) DEVICE — HANDPIECE HYDRODEBRIDER

## (undated) DEVICE — DRESSING AQUACEL SACRAL 9 X 9

## (undated) DEVICE — STAPLER GIA HANDLE STD

## (undated) DEVICE — RAD 40 CVD SINUS BLADE

## (undated) DEVICE — SUT MCRYL PLUS 4-0 PS2 27IN

## (undated) DEVICE — APPLICATOR ARISTA FLEX XL

## (undated) DEVICE — WIPE ESENTA BARR STNG FREE 3ML

## (undated) DEVICE — GLOVE BIOGEL SKINSENSE PI 7.5

## (undated) DEVICE — GLOVE BIOGEL ECLIPSE SZ 7.5

## (undated) DEVICE — SEE MEDLINE ITEM 152496

## (undated) DEVICE — TIP RUMI KOH-EFFICIENT 3.0

## (undated) DEVICE — SEAL CANNULA DA VINCI 12MM

## (undated) DEVICE — DRESSING TELFA STRL 4X3 LF

## (undated) DEVICE — SUT VICRYL 0 27 CT-2

## (undated) DEVICE — SOL WATER STRL IRR 1000ML

## (undated) DEVICE — COVER TIP CURVED SCISSORS XI

## (undated) DEVICE — BLADE INFERIOR TURBINATE 5/PK

## (undated) DEVICE — DRAPE SLUSH WARMER WITH DISC

## (undated) DEVICE — SUT VICRYL PLUS 0 CT1 36IN

## (undated) DEVICE — COTTON BALLS 1IN

## (undated) DEVICE — APPLIER LIGACLIP SM 9.38IN

## (undated) DEVICE — NDL INSUF ULTRA VERESS 120MM

## (undated) DEVICE — KIT WING PAD POSITIONING

## (undated) DEVICE — DRAIN CHANNEL ROUND 19FR

## (undated) DEVICE — TRACKER PATIENT NON INVASIVE

## (undated) DEVICE — SUT MONOCRYL 3-0 PS-1

## (undated) DEVICE — DRESSING TRANS 4X4 TEGADERM

## (undated) DEVICE — SUT PROLENE 4-0 RB-1 BL MO

## (undated) DEVICE — POSITIONER HEAD ADULT

## (undated) DEVICE — KIT PROBE COVER WITH GEL

## (undated) DEVICE — SEE MEDLINE ITEM 157117

## (undated) DEVICE — SUT SILK 2-0 SH 18IN BLACK

## (undated) DEVICE — TIP RUMI MANIPULATOR LAVENDER

## (undated) DEVICE — ELECTRODE EXTENDED BLADE

## (undated) DEVICE — GAUZE SPONGE 4X4 12PLY

## (undated) DEVICE — KIT COLLECTION E SWAB REGULAR

## (undated) DEVICE — SUT SILK BLK BR. 2 2-60

## (undated) DEVICE — HEMOSTAT VITAGEL RT 4.5

## (undated) DEVICE — SEE MEDLINE ITEM 156913

## (undated) DEVICE — CORD FOR BIPOLAR FORCEPS 12

## (undated) DEVICE — NDL HYPO 27G X 1 1/2

## (undated) DEVICE — SEE MEDLINE ITEM 156894

## (undated) DEVICE — ENDOAPTH VAS RELOAD WHITE 2.5

## (undated) DEVICE — KIT MICROINTRODUCE MINI 5X10CM

## (undated) DEVICE — ADHESIVE DERMABOND ADVANCED

## (undated) DEVICE — STAPLER ECHELON FLEX GST 45MM

## (undated) DEVICE — STAPLE TRI-STAPLE 2.0 45MM BLK

## (undated) DEVICE — BLADE SURG CARBON STEEL SZ11

## (undated) DEVICE — IRRIGATOR ENDOSCOPY DISP.

## (undated) DEVICE — SET TRI-LUMEN FILTERED TUBE

## (undated) DEVICE — CYTOSPIN COLLECTION FLUID BLT

## (undated) DEVICE — CATH SWAN GANZ STND 7FR

## (undated) DEVICE — PAD PERI POST REPLACEMNT

## (undated) DEVICE — SOL NS 1000CC

## (undated) DEVICE — DRAPE SPLIT STERILE

## (undated) DEVICE — SET CYSTO IRRIGATION UNIV SPIK

## (undated) DEVICE — TRACKER ENT INSTRUMENT

## (undated) DEVICE — RETRACTOR OCTOBASE INSERT HOLD

## (undated) DEVICE — CUSHION LARGE

## (undated) DEVICE — SUT PROLENE 4-0 SH BLU 36IN

## (undated) DEVICE — SYR 50CC LL

## (undated) DEVICE — SEE MEDLINE ITEM 156923

## (undated) DEVICE — TRAY HEART

## (undated) DEVICE — BLADE 4IN EDGE INSULATED

## (undated) DEVICE — KIT ROBOTIC 3 ARM DA VINCI SI

## (undated) DEVICE — SUCTION COAGULATOR 10FR 6IN

## (undated) DEVICE — SOL STRL WATER INJ 1000ML BG

## (undated) DEVICE — SEE MEDLINE ITEM 157148

## (undated) DEVICE — SOL 9P NACL IRR PIC IL

## (undated) DEVICE — SEE MEDLINE ITEM 146416

## (undated) DEVICE — SHEATH LENS CLN 4MM 0D A70940A

## (undated) DEVICE — KIT URINARY CATH URINE METER

## (undated) DEVICE — JELLY KY LUBRICATING 5G PACKET

## (undated) DEVICE — HANDSET ARGON PLUS

## (undated) DEVICE — GLOVE ENCORE ORTHO PWDR BRN 7

## (undated) DEVICE — SPONGE PATTY SURGICAL .5X3IN

## (undated) DEVICE — NDL 18GA X1 1/2 REG BEVEL

## (undated) DEVICE — SYR 60CC W/GAUGE

## (undated) DEVICE — GAUZE DERMACEA 3 PLY 4IN 4YD

## (undated) DEVICE — DRESSING LEUKOPLAST FLEX 1X3IN

## (undated) DEVICE — ADHESIVE MASTISOL VIAL 48/BX

## (undated) DEVICE — CLOSURE SKIN STERI STRIP 1/2X4

## (undated) DEVICE — LOOP VESSEL BLUE MAXI

## (undated) DEVICE — SOL ELECTROLUBE ANTI-STIC

## (undated) DEVICE — SUT VICRYL BR 1 GEN 27 CT-1

## (undated) DEVICE — PORT ACCESS 5MM W/120MM

## (undated) DEVICE — DRAIN CHEST DRY SUCTION